# Patient Record
Sex: MALE | Race: WHITE | NOT HISPANIC OR LATINO | Employment: FULL TIME | URBAN - METROPOLITAN AREA
[De-identification: names, ages, dates, MRNs, and addresses within clinical notes are randomized per-mention and may not be internally consistent; named-entity substitution may affect disease eponyms.]

---

## 2017-01-03 ENCOUNTER — COMMUNICATION - HEALTHEAST (OUTPATIENT)
Dept: NURSING | Facility: CLINIC | Age: 52
End: 2017-01-03

## 2017-01-03 ENCOUNTER — OFFICE VISIT - HEALTHEAST (OUTPATIENT)
Dept: INTERNAL MEDICINE | Facility: CLINIC | Age: 52
End: 2017-01-03

## 2017-01-03 DIAGNOSIS — I48.0 PAROXYSMAL ATRIAL FIBRILLATION (H): ICD-10-CM

## 2017-01-03 DIAGNOSIS — I48.92 ATRIAL FLUTTER (H): ICD-10-CM

## 2017-01-03 DIAGNOSIS — Q21.10 ASD (ATRIAL SEPTAL DEFECT): ICD-10-CM

## 2017-01-03 DIAGNOSIS — I42.9 CARDIOMYOPATHY (H): ICD-10-CM

## 2017-01-03 DIAGNOSIS — I48.92 ATRIAL FLUTTER, UNSPECIFIED TYPE (H): ICD-10-CM

## 2017-01-03 DIAGNOSIS — J06.9 UPPER RESPIRATORY TRACT INFECTION, UNSPECIFIED TYPE: ICD-10-CM

## 2017-01-03 DIAGNOSIS — J02.9 SORE THROAT: ICD-10-CM

## 2017-01-03 ASSESSMENT — MIFFLIN-ST. JEOR: SCORE: 1638.19

## 2017-01-04 ENCOUNTER — COMMUNICATION - HEALTHEAST (OUTPATIENT)
Dept: INTERNAL MEDICINE | Facility: CLINIC | Age: 52
End: 2017-01-04

## 2017-01-05 ENCOUNTER — COMMUNICATION - HEALTHEAST (OUTPATIENT)
Dept: INTERNAL MEDICINE | Facility: CLINIC | Age: 52
End: 2017-01-05

## 2017-01-09 ENCOUNTER — AMBULATORY - HEALTHEAST (OUTPATIENT)
Dept: LAB | Facility: CLINIC | Age: 52
End: 2017-01-09

## 2017-01-09 ENCOUNTER — COMMUNICATION - HEALTHEAST (OUTPATIENT)
Dept: NURSING | Facility: CLINIC | Age: 52
End: 2017-01-09

## 2017-01-09 DIAGNOSIS — I48.92 ATRIAL FLUTTER (H): ICD-10-CM

## 2017-01-09 DIAGNOSIS — Q21.10 ASD (ATRIAL SEPTAL DEFECT): ICD-10-CM

## 2017-01-09 DIAGNOSIS — I42.9 CARDIOMYOPATHY (H): ICD-10-CM

## 2017-01-09 DIAGNOSIS — I48.0 PAROXYSMAL ATRIAL FIBRILLATION (H): ICD-10-CM

## 2017-01-09 DIAGNOSIS — I48.92 ATRIAL FLUTTER, UNSPECIFIED TYPE (H): ICD-10-CM

## 2017-01-11 ENCOUNTER — OFFICE VISIT - HEALTHEAST (OUTPATIENT)
Dept: INTERNAL MEDICINE | Facility: CLINIC | Age: 52
End: 2017-01-11

## 2017-01-11 ENCOUNTER — COMMUNICATION - HEALTHEAST (OUTPATIENT)
Dept: INTERNAL MEDICINE | Facility: CLINIC | Age: 52
End: 2017-01-11

## 2017-01-11 DIAGNOSIS — I50.22 CHRONIC SYSTOLIC CHF (CONGESTIVE HEART FAILURE), NYHA CLASS 2 (H): ICD-10-CM

## 2017-01-11 DIAGNOSIS — J06.9 URI (UPPER RESPIRATORY INFECTION): ICD-10-CM

## 2017-01-11 DIAGNOSIS — N41.9 PROSTATITIS: ICD-10-CM

## 2017-01-11 DIAGNOSIS — N18.30 CRI (CHRONIC RENAL INSUFFICIENCY), STAGE 3 (MODERATE) (H): ICD-10-CM

## 2017-01-11 DIAGNOSIS — J45.20 MILD INTERMITTENT ASTHMA: ICD-10-CM

## 2017-01-11 ASSESSMENT — MIFFLIN-ST. JEOR: SCORE: 1633.65

## 2017-01-24 ENCOUNTER — COMMUNICATION - HEALTHEAST (OUTPATIENT)
Dept: NURSING | Facility: CLINIC | Age: 52
End: 2017-01-24

## 2017-01-24 ENCOUNTER — AMBULATORY - HEALTHEAST (OUTPATIENT)
Dept: LAB | Facility: CLINIC | Age: 52
End: 2017-01-24

## 2017-01-24 DIAGNOSIS — Q21.10 ASD (ATRIAL SEPTAL DEFECT): ICD-10-CM

## 2017-01-24 DIAGNOSIS — I48.0 PAROXYSMAL ATRIAL FIBRILLATION (H): ICD-10-CM

## 2017-01-24 DIAGNOSIS — I42.9 CARDIOMYOPATHY (H): ICD-10-CM

## 2017-01-24 DIAGNOSIS — I48.92 ATRIAL FLUTTER, UNSPECIFIED TYPE (H): ICD-10-CM

## 2017-01-24 DIAGNOSIS — I48.92 ATRIAL FLUTTER (H): ICD-10-CM

## 2017-01-26 ENCOUNTER — RECORDS - HEALTHEAST (OUTPATIENT)
Dept: ADMINISTRATIVE | Facility: OTHER | Age: 52
End: 2017-01-26

## 2017-01-31 ENCOUNTER — COMMUNICATION - HEALTHEAST (OUTPATIENT)
Dept: INTERNAL MEDICINE | Facility: CLINIC | Age: 52
End: 2017-01-31

## 2017-02-14 ENCOUNTER — AMBULATORY - HEALTHEAST (OUTPATIENT)
Dept: CARDIOLOGY | Facility: CLINIC | Age: 52
End: 2017-02-14

## 2017-02-14 DIAGNOSIS — Z95.810 ICD (IMPLANTABLE CARDIOVERTER-DEFIBRILLATOR), BIVENTRICULAR, IN SITU: ICD-10-CM

## 2017-02-15 ENCOUNTER — OFFICE VISIT - HEALTHEAST (OUTPATIENT)
Dept: INTERNAL MEDICINE | Facility: CLINIC | Age: 52
End: 2017-02-15

## 2017-02-15 ENCOUNTER — COMMUNICATION - HEALTHEAST (OUTPATIENT)
Dept: NURSING | Facility: CLINIC | Age: 52
End: 2017-02-15

## 2017-02-15 DIAGNOSIS — J45.901 ASTHMA EXACERBATION: ICD-10-CM

## 2017-02-15 DIAGNOSIS — I42.9 CARDIOMYOPATHY (H): ICD-10-CM

## 2017-02-15 DIAGNOSIS — I48.0 PAROXYSMAL ATRIAL FIBRILLATION (H): ICD-10-CM

## 2017-02-15 DIAGNOSIS — I48.92 ATRIAL FLUTTER, UNSPECIFIED TYPE (H): ICD-10-CM

## 2017-02-15 DIAGNOSIS — I48.92 ATRIAL FLUTTER (H): ICD-10-CM

## 2017-02-15 DIAGNOSIS — Q21.10 ASD (ATRIAL SEPTAL DEFECT): ICD-10-CM

## 2017-02-15 DIAGNOSIS — J32.9 SINUSITIS: ICD-10-CM

## 2017-02-15 ASSESSMENT — MIFFLIN-ST. JEOR: SCORE: 1620.05

## 2017-02-20 ENCOUNTER — COMMUNICATION - HEALTHEAST (OUTPATIENT)
Dept: NURSING | Facility: CLINIC | Age: 52
End: 2017-02-20

## 2017-02-20 ENCOUNTER — AMBULATORY - HEALTHEAST (OUTPATIENT)
Dept: LAB | Facility: CLINIC | Age: 52
End: 2017-02-20

## 2017-02-20 DIAGNOSIS — Q21.10 ASD (ATRIAL SEPTAL DEFECT): ICD-10-CM

## 2017-02-20 DIAGNOSIS — I48.92 ATRIAL FLUTTER (H): ICD-10-CM

## 2017-02-20 DIAGNOSIS — I48.0 PAROXYSMAL ATRIAL FIBRILLATION (H): ICD-10-CM

## 2017-02-20 DIAGNOSIS — I48.92 ATRIAL FLUTTER, UNSPECIFIED TYPE (H): ICD-10-CM

## 2017-02-20 DIAGNOSIS — I42.9 CARDIOMYOPATHY (H): ICD-10-CM

## 2017-03-11 ENCOUNTER — COMMUNICATION - HEALTHEAST (OUTPATIENT)
Dept: INTERNAL MEDICINE | Facility: CLINIC | Age: 52
End: 2017-03-11

## 2017-03-11 DIAGNOSIS — J30.2 ALLERGIC RHINITIS, SEASONAL: ICD-10-CM

## 2017-03-20 ENCOUNTER — COMMUNICATION - HEALTHEAST (OUTPATIENT)
Dept: INTERNAL MEDICINE | Facility: CLINIC | Age: 52
End: 2017-03-20

## 2017-03-27 ENCOUNTER — COMMUNICATION - HEALTHEAST (OUTPATIENT)
Dept: INTERNAL MEDICINE | Facility: CLINIC | Age: 52
End: 2017-03-27

## 2017-03-27 ENCOUNTER — COMMUNICATION - HEALTHEAST (OUTPATIENT)
Dept: NURSING | Facility: CLINIC | Age: 52
End: 2017-03-27

## 2017-03-27 DIAGNOSIS — J32.9 SINUSITIS: ICD-10-CM

## 2017-03-27 DIAGNOSIS — J32.9 RECURRENT SINUSITIS: ICD-10-CM

## 2017-03-31 ENCOUNTER — AMBULATORY - HEALTHEAST (OUTPATIENT)
Dept: LAB | Facility: CLINIC | Age: 52
End: 2017-03-31

## 2017-03-31 ENCOUNTER — COMMUNICATION - HEALTHEAST (OUTPATIENT)
Dept: NURSING | Facility: CLINIC | Age: 52
End: 2017-03-31

## 2017-03-31 DIAGNOSIS — I48.92 ATRIAL FLUTTER (H): ICD-10-CM

## 2017-03-31 DIAGNOSIS — I42.9 CARDIOMYOPATHY (H): ICD-10-CM

## 2017-03-31 DIAGNOSIS — Q21.10 ASD (ATRIAL SEPTAL DEFECT): ICD-10-CM

## 2017-03-31 DIAGNOSIS — I48.0 PAROXYSMAL ATRIAL FIBRILLATION (H): ICD-10-CM

## 2017-04-03 ENCOUNTER — COMMUNICATION - HEALTHEAST (OUTPATIENT)
Dept: INTERNAL MEDICINE | Facility: CLINIC | Age: 52
End: 2017-04-03

## 2017-04-04 ENCOUNTER — COMMUNICATION - HEALTHEAST (OUTPATIENT)
Dept: INTERNAL MEDICINE | Facility: CLINIC | Age: 52
End: 2017-04-04

## 2017-04-05 ENCOUNTER — COMMUNICATION - HEALTHEAST (OUTPATIENT)
Dept: NURSING | Facility: CLINIC | Age: 52
End: 2017-04-05

## 2017-04-05 DIAGNOSIS — I48.91 ATRIAL FIBRILLATION (H): ICD-10-CM

## 2017-04-07 ENCOUNTER — COMMUNICATION - HEALTHEAST (OUTPATIENT)
Dept: INTERNAL MEDICINE | Facility: CLINIC | Age: 52
End: 2017-04-07

## 2017-04-13 ENCOUNTER — COMMUNICATION - HEALTHEAST (OUTPATIENT)
Dept: NURSING | Facility: CLINIC | Age: 52
End: 2017-04-13

## 2017-04-13 ENCOUNTER — AMBULATORY - HEALTHEAST (OUTPATIENT)
Dept: LAB | Facility: CLINIC | Age: 52
End: 2017-04-13

## 2017-04-13 DIAGNOSIS — Q21.10 ASD (ATRIAL SEPTAL DEFECT): ICD-10-CM

## 2017-04-13 DIAGNOSIS — I48.91 ATRIAL FIBRILLATION (H): ICD-10-CM

## 2017-04-13 DIAGNOSIS — I48.0 PAROXYSMAL ATRIAL FIBRILLATION (H): ICD-10-CM

## 2017-04-13 DIAGNOSIS — I42.9 CARDIOMYOPATHY (H): ICD-10-CM

## 2017-04-13 DIAGNOSIS — I48.92 ATRIAL FLUTTER (H): ICD-10-CM

## 2017-04-20 ENCOUNTER — OFFICE VISIT - HEALTHEAST (OUTPATIENT)
Dept: ALLERGY | Facility: CLINIC | Age: 52
End: 2017-04-20

## 2017-04-20 ENCOUNTER — RECORDS - HEALTHEAST (OUTPATIENT)
Dept: ADMINISTRATIVE | Facility: OTHER | Age: 52
End: 2017-04-20

## 2017-04-20 DIAGNOSIS — J30.89 ALLERGIC RHINITIS DUE TO OTHER ALLERGEN: ICD-10-CM

## 2017-04-20 DIAGNOSIS — J45.30 MILD PERSISTENT ASTHMA WITHOUT COMPLICATION: ICD-10-CM

## 2017-04-21 ENCOUNTER — AMBULATORY - HEALTHEAST (OUTPATIENT)
Dept: ALLERGY | Facility: CLINIC | Age: 52
End: 2017-04-21

## 2017-04-21 ENCOUNTER — COMMUNICATION - HEALTHEAST (OUTPATIENT)
Dept: ALLERGY | Facility: CLINIC | Age: 52
End: 2017-04-21

## 2017-04-21 DIAGNOSIS — J30.9 ALLERGIC RHINITIS: ICD-10-CM

## 2017-04-22 ENCOUNTER — COMMUNICATION - HEALTHEAST (OUTPATIENT)
Dept: INTERNAL MEDICINE | Facility: CLINIC | Age: 52
End: 2017-04-22

## 2017-04-25 ENCOUNTER — OFFICE VISIT - HEALTHEAST (OUTPATIENT)
Dept: CARDIOLOGY | Facility: CLINIC | Age: 52
End: 2017-04-25

## 2017-04-25 ENCOUNTER — COMMUNICATION - HEALTHEAST (OUTPATIENT)
Dept: NURSING | Facility: CLINIC | Age: 52
End: 2017-04-25

## 2017-04-25 DIAGNOSIS — Q21.10 ASD (ATRIAL SEPTAL DEFECT): ICD-10-CM

## 2017-04-25 DIAGNOSIS — I48.0 PAROXYSMAL ATRIAL FIBRILLATION (H): ICD-10-CM

## 2017-04-25 DIAGNOSIS — I42.9 CARDIOMYOPATHY (H): ICD-10-CM

## 2017-04-25 DIAGNOSIS — I47.29 NSVT (NONSUSTAINED VENTRICULAR TACHYCARDIA) (H): ICD-10-CM

## 2017-04-25 DIAGNOSIS — I48.91 ATRIAL FIBRILLATION (H): ICD-10-CM

## 2017-04-25 DIAGNOSIS — I48.92 ATRIAL FLUTTER (H): ICD-10-CM

## 2017-04-25 ASSESSMENT — MIFFLIN-ST. JEOR: SCORE: 1630.03

## 2017-05-08 ENCOUNTER — COMMUNICATION - HEALTHEAST (OUTPATIENT)
Dept: INTERNAL MEDICINE | Facility: CLINIC | Age: 52
End: 2017-05-08

## 2017-05-08 ENCOUNTER — HOSPITAL ENCOUNTER (OUTPATIENT)
Dept: CARDIOLOGY | Facility: HOSPITAL | Age: 52
Discharge: HOME | End: 2017-05-08
Attending: INTERNAL MEDICINE

## 2017-05-08 DIAGNOSIS — I42.9 CARDIOMYOPATHY (H): ICD-10-CM

## 2017-05-08 LAB
AORTIC ROOT: 3.4 CM
DOP CALC LVOT AREA: 3.8 CM2
DOP CALC LVOT DIAMETER: 2.2 CM
DOP CALC LVOT PEAK VEL: 79.7 CM/S
DOP CALC LVOT STROKE VOLUME: 54.7 CM3
DOP CALCLVOT PEAK VEL VTI: 14.4 CM
ECHO EJECTION FRACTION ESTIMATED: 25 %
EJECTION FRACTION: 32 % (ref 55–75)
FRACTIONAL SHORTENING: 9.3 % (ref 28–44)
INTERVENTRICULAR SEPTUM IN END DIASTOLE: 1.1 CM (ref 0.6–1)
IVS/PW RATIO: 1
LA AREA 1: 45.8 CM2
LA AREA 2: 52.7 CM2
LEFT ATRIUM LENGTH: 7.56 CM
LEFT ATRIUM SIZE: 6.5 CM
LEFT ATRIUM VOLUME: 271.4 CM3
LEFT VENTRICLE DIASTOLIC VOLUME: 169 CM3 (ref 62–150)
LEFT VENTRICLE SYSTOLIC VOLUME: 115 CM3 (ref 21–61)
LEFT VENTRICULAR INTERNAL DIMENSION IN DIASTOLE: 5.4 CM (ref 4.2–5.8)
LEFT VENTRICULAR INTERNAL DIMENSION IN SYSTOLE: 4.9 CM (ref 2.5–4)
LEFT VENTRICULAR MASS: 234.8 G
LEFT VENTRICULAR OUTFLOW TRACT MEAN GRADIENT: 2 MMHG
LEFT VENTRICULAR OUTFLOW TRACT MEAN VELOCITY: 65.6 CM/S
LEFT VENTRICULAR OUTFLOW TRACT PEAK GRADIENT: 3 MMHG
LEFT VENTRICULAR POSTERIOR WALL IN END DIASTOLE: 1.1 CM (ref 0.6–1)
MITRAL REGURGITANT VELOCITY TIME INTEGRAL: 127 CM
MR FLOW: 17 CM3
MR MEAN GRADIENT: 49 MMHG
MR MEAN VELOCITY: 342 CM/S
MR PEAK GRADIENT: 60.5 MMHG
MR PISA EROA: 0.1 CM2
MR PISA RADIUS: 0.5 CM
MR PISA VN NYQUIST: 30.8 CM/S
MV AVERAGE E/E' RATIO: 15 CM/S
MV DECELERATION TIME: 109 MS
MV E'TISSUE VEL-LAT: 7.02 CM/S
MV E'TISSUE VEL-MED: 6.14 CM/S
MV LATERAL E/E' RATIO: 14.1
MV MEDIAL E/E' RATIO: 16.1
MV PEAK E VELOCITY: 98.7 CM/S
MV REGURGITANT VOLUME: 15.8 CC
PISA MR PEAK VEL: 389 CM/S
PR MAX PG: 9 MMHG
PR PEAK VELOCITY: 151 CM/S
TRICUSPID REGURGITATION PEAK PRESSURE GRADIENT: 20.6 MMHG
TRICUSPID VALVE ANULAR PLANE SYSTOLIC EXCURSION: 0.8 CM
TRICUSPID VALVE PEAK REGURGITANT VELOCITY: 227 CM/S

## 2017-05-15 ENCOUNTER — AMBULATORY - HEALTHEAST (OUTPATIENT)
Dept: CARDIOLOGY | Facility: CLINIC | Age: 52
End: 2017-05-15

## 2017-05-15 ENCOUNTER — COMMUNICATION - HEALTHEAST (OUTPATIENT)
Dept: NURSING | Facility: CLINIC | Age: 52
End: 2017-05-15

## 2017-05-15 DIAGNOSIS — Z95.810 ICD (IMPLANTABLE CARDIOVERTER-DEFIBRILLATOR), BIVENTRICULAR, IN SITU: ICD-10-CM

## 2017-05-16 LAB — HCC DEVICE COMMENTS: NORMAL

## 2017-05-18 ENCOUNTER — COMMUNICATION - HEALTHEAST (OUTPATIENT)
Dept: NURSING | Facility: CLINIC | Age: 52
End: 2017-05-18

## 2017-05-18 ENCOUNTER — AMBULATORY - HEALTHEAST (OUTPATIENT)
Dept: LAB | Facility: CLINIC | Age: 52
End: 2017-05-18

## 2017-05-18 DIAGNOSIS — I48.92 ATRIAL FLUTTER (H): ICD-10-CM

## 2017-05-18 DIAGNOSIS — I48.0 PAROXYSMAL ATRIAL FIBRILLATION (H): ICD-10-CM

## 2017-05-18 DIAGNOSIS — Q21.10 ASD (ATRIAL SEPTAL DEFECT): ICD-10-CM

## 2017-05-18 DIAGNOSIS — I48.91 ATRIAL FIBRILLATION (H): ICD-10-CM

## 2017-05-18 DIAGNOSIS — I42.9 CARDIOMYOPATHY (H): ICD-10-CM

## 2017-05-22 ENCOUNTER — COMMUNICATION - HEALTHEAST (OUTPATIENT)
Dept: INTERNAL MEDICINE | Facility: CLINIC | Age: 52
End: 2017-05-22

## 2017-05-22 DIAGNOSIS — J45.20 MILD INTERMITTENT ASTHMA: ICD-10-CM

## 2017-06-01 ENCOUNTER — COMMUNICATION - HEALTHEAST (OUTPATIENT)
Dept: NURSING | Facility: CLINIC | Age: 52
End: 2017-06-01

## 2017-06-01 ENCOUNTER — OFFICE VISIT - HEALTHEAST (OUTPATIENT)
Dept: INTERNAL MEDICINE | Facility: CLINIC | Age: 52
End: 2017-06-01

## 2017-06-01 ENCOUNTER — COMMUNICATION - HEALTHEAST (OUTPATIENT)
Dept: INTERNAL MEDICINE | Facility: CLINIC | Age: 52
End: 2017-06-01

## 2017-06-01 DIAGNOSIS — I42.9 CARDIOMYOPATHY (H): ICD-10-CM

## 2017-06-01 DIAGNOSIS — I48.0 PAROXYSMAL ATRIAL FIBRILLATION (H): ICD-10-CM

## 2017-06-01 DIAGNOSIS — Z51.81 MEDICATION MONITORING ENCOUNTER: ICD-10-CM

## 2017-06-01 DIAGNOSIS — Q21.10 ASD (ATRIAL SEPTAL DEFECT): ICD-10-CM

## 2017-06-01 DIAGNOSIS — Z86.79 HISTORY OF VENTRICULAR TACHYCARDIA: ICD-10-CM

## 2017-06-01 DIAGNOSIS — Z95.810 ICD (IMPLANTABLE CARDIOVERTER-DEFIBRILLATOR), BIVENTRICULAR, IN SITU: ICD-10-CM

## 2017-06-01 DIAGNOSIS — E03.9 HYPOTHYROIDISM, UNSPECIFIED TYPE: ICD-10-CM

## 2017-06-01 DIAGNOSIS — I48.92 ATRIAL FLUTTER (H): ICD-10-CM

## 2017-06-01 DIAGNOSIS — J45.20 MILD INTERMITTENT ASTHMA WITHOUT COMPLICATION: ICD-10-CM

## 2017-06-01 DIAGNOSIS — I48.91 ATRIAL FIBRILLATION (H): ICD-10-CM

## 2017-06-01 DIAGNOSIS — I50.22 CHRONIC SYSTOLIC CHF (CONGESTIVE HEART FAILURE), NYHA CLASS 2 (H): ICD-10-CM

## 2017-06-01 ASSESSMENT — MIFFLIN-ST. JEOR: SCORE: 1610.97

## 2017-06-02 ENCOUNTER — COMMUNICATION - HEALTHEAST (OUTPATIENT)
Dept: INTERNAL MEDICINE | Facility: CLINIC | Age: 52
End: 2017-06-02

## 2017-06-06 ENCOUNTER — COMMUNICATION - HEALTHEAST (OUTPATIENT)
Dept: INTERNAL MEDICINE | Facility: CLINIC | Age: 52
End: 2017-06-06

## 2017-06-06 ENCOUNTER — AMBULATORY - HEALTHEAST (OUTPATIENT)
Dept: LAB | Facility: CLINIC | Age: 52
End: 2017-06-06

## 2017-06-06 DIAGNOSIS — Z51.81 MEDICATION MONITORING ENCOUNTER: ICD-10-CM

## 2017-06-14 ENCOUNTER — HOSPITAL ENCOUNTER (OUTPATIENT)
Dept: RESPIRATORY THERAPY | Facility: HOSPITAL | Age: 52
Discharge: HOME | End: 2017-06-14
Attending: INTERNAL MEDICINE

## 2017-06-14 DIAGNOSIS — J45.20 MILD INTERMITTENT ASTHMA WITHOUT COMPLICATION: ICD-10-CM

## 2017-06-18 ENCOUNTER — COMMUNICATION - HEALTHEAST (OUTPATIENT)
Dept: INTERNAL MEDICINE | Facility: CLINIC | Age: 52
End: 2017-06-18

## 2017-06-19 ENCOUNTER — COMMUNICATION - HEALTHEAST (OUTPATIENT)
Dept: INTERNAL MEDICINE | Facility: CLINIC | Age: 52
End: 2017-06-19

## 2017-06-19 DIAGNOSIS — J30.9 ALLERGIC RHINITIS: ICD-10-CM

## 2017-06-25 ENCOUNTER — COMMUNICATION - HEALTHEAST (OUTPATIENT)
Dept: INTERNAL MEDICINE | Facility: CLINIC | Age: 52
End: 2017-06-25

## 2017-07-06 ENCOUNTER — COMMUNICATION - HEALTHEAST (OUTPATIENT)
Dept: NURSING | Facility: CLINIC | Age: 52
End: 2017-07-06

## 2017-07-11 ENCOUNTER — RECORDS - HEALTHEAST (OUTPATIENT)
Dept: ADMINISTRATIVE | Facility: OTHER | Age: 52
End: 2017-07-11

## 2017-07-11 ENCOUNTER — AMBULATORY - HEALTHEAST (OUTPATIENT)
Dept: CARDIOLOGY | Facility: CLINIC | Age: 52
End: 2017-07-11

## 2017-07-13 ENCOUNTER — COMMUNICATION - HEALTHEAST (OUTPATIENT)
Dept: NURSING | Facility: CLINIC | Age: 52
End: 2017-07-13

## 2017-07-13 DIAGNOSIS — I48.92 ATRIAL FLUTTER (H): ICD-10-CM

## 2017-07-13 DIAGNOSIS — I42.9 CARDIOMYOPATHY (H): ICD-10-CM

## 2017-07-13 DIAGNOSIS — Q21.10 ASD (ATRIAL SEPTAL DEFECT): ICD-10-CM

## 2017-07-13 DIAGNOSIS — I48.0 PAROXYSMAL ATRIAL FIBRILLATION (H): ICD-10-CM

## 2017-07-18 ENCOUNTER — COMMUNICATION - HEALTHEAST (OUTPATIENT)
Dept: ALLERGY | Facility: CLINIC | Age: 52
End: 2017-07-18

## 2017-07-18 DIAGNOSIS — J45.30 MILD PERSISTENT ASTHMA WITHOUT COMPLICATION: ICD-10-CM

## 2017-07-21 ENCOUNTER — RECORDS - HEALTHEAST (OUTPATIENT)
Dept: ADMINISTRATIVE | Facility: OTHER | Age: 52
End: 2017-07-21

## 2017-07-27 ENCOUNTER — COMMUNICATION - HEALTHEAST (OUTPATIENT)
Dept: NURSING | Facility: CLINIC | Age: 52
End: 2017-07-27

## 2017-07-27 ENCOUNTER — AMBULATORY - HEALTHEAST (OUTPATIENT)
Dept: LAB | Facility: CLINIC | Age: 52
End: 2017-07-27

## 2017-07-27 DIAGNOSIS — I48.0 PAROXYSMAL ATRIAL FIBRILLATION (H): ICD-10-CM

## 2017-07-27 DIAGNOSIS — I48.91 ATRIAL FIBRILLATION (H): ICD-10-CM

## 2017-07-27 DIAGNOSIS — I48.92 ATRIAL FLUTTER (H): ICD-10-CM

## 2017-07-27 DIAGNOSIS — Q21.10 ASD (ATRIAL SEPTAL DEFECT): ICD-10-CM

## 2017-07-27 DIAGNOSIS — I42.9 CARDIOMYOPATHY (H): ICD-10-CM

## 2017-08-01 ENCOUNTER — COMMUNICATION - HEALTHEAST (OUTPATIENT)
Dept: INTERNAL MEDICINE | Facility: CLINIC | Age: 52
End: 2017-08-01

## 2017-08-10 ENCOUNTER — COMMUNICATION - HEALTHEAST (OUTPATIENT)
Dept: INTERNAL MEDICINE | Facility: CLINIC | Age: 52
End: 2017-08-10

## 2017-08-17 ENCOUNTER — COMMUNICATION - HEALTHEAST (OUTPATIENT)
Dept: ALLERGY | Facility: CLINIC | Age: 52
End: 2017-08-17

## 2017-08-17 DIAGNOSIS — J45.30 MILD PERSISTENT ASTHMA WITHOUT COMPLICATION: ICD-10-CM

## 2017-08-21 ENCOUNTER — COMMUNICATION - HEALTHEAST (OUTPATIENT)
Dept: ALLERGY | Facility: CLINIC | Age: 52
End: 2017-08-21

## 2017-08-21 DIAGNOSIS — J45.30 MILD PERSISTENT ASTHMA WITHOUT COMPLICATION: ICD-10-CM

## 2017-08-23 ENCOUNTER — AMBULATORY - HEALTHEAST (OUTPATIENT)
Dept: LAB | Facility: CLINIC | Age: 52
End: 2017-08-23

## 2017-08-23 ENCOUNTER — COMMUNICATION - HEALTHEAST (OUTPATIENT)
Dept: NURSING | Facility: CLINIC | Age: 52
End: 2017-08-23

## 2017-08-23 DIAGNOSIS — I48.92 ATRIAL FLUTTER (H): ICD-10-CM

## 2017-08-23 DIAGNOSIS — I48.0 PAROXYSMAL ATRIAL FIBRILLATION (H): ICD-10-CM

## 2017-08-23 DIAGNOSIS — I42.9 CARDIOMYOPATHY (H): ICD-10-CM

## 2017-08-23 DIAGNOSIS — I48.91 ATRIAL FIBRILLATION (H): ICD-10-CM

## 2017-08-23 DIAGNOSIS — Q21.10 ASD (ATRIAL SEPTAL DEFECT): ICD-10-CM

## 2017-08-28 ENCOUNTER — RECORDS - HEALTHEAST (OUTPATIENT)
Dept: ADMINISTRATIVE | Facility: OTHER | Age: 52
End: 2017-08-28

## 2017-08-28 ENCOUNTER — COMMUNICATION - HEALTHEAST (OUTPATIENT)
Dept: INTERNAL MEDICINE | Facility: CLINIC | Age: 52
End: 2017-08-28

## 2017-08-29 ENCOUNTER — AMBULATORY - HEALTHEAST (OUTPATIENT)
Dept: CARDIOLOGY | Facility: CLINIC | Age: 52
End: 2017-08-29

## 2017-08-31 ENCOUNTER — OFFICE VISIT - HEALTHEAST (OUTPATIENT)
Dept: ALLERGY | Facility: CLINIC | Age: 52
End: 2017-08-31

## 2017-08-31 DIAGNOSIS — J30.89 ALLERGIC RHINITIS DUE TO OTHER ALLERGEN: ICD-10-CM

## 2017-08-31 DIAGNOSIS — J45.40 MODERATE PERSISTENT ASTHMA WITHOUT COMPLICATION: ICD-10-CM

## 2017-09-05 ENCOUNTER — AMBULATORY - HEALTHEAST (OUTPATIENT)
Dept: CARDIOLOGY | Facility: CLINIC | Age: 52
End: 2017-09-05

## 2017-09-05 DIAGNOSIS — Z95.810 ICD (IMPLANTABLE CARDIOVERTER-DEFIBRILLATOR), BIVENTRICULAR, IN SITU: ICD-10-CM

## 2017-09-06 ENCOUNTER — COMMUNICATION - HEALTHEAST (OUTPATIENT)
Dept: CARDIOLOGY | Facility: CLINIC | Age: 52
End: 2017-09-06

## 2017-09-07 LAB — HCC DEVICE COMMENTS: NORMAL

## 2017-09-13 ENCOUNTER — COMMUNICATION - HEALTHEAST (OUTPATIENT)
Dept: INTERNAL MEDICINE | Facility: CLINIC | Age: 52
End: 2017-09-13

## 2017-09-13 ENCOUNTER — AMBULATORY - HEALTHEAST (OUTPATIENT)
Dept: CARDIOLOGY | Facility: CLINIC | Age: 52
End: 2017-09-13

## 2017-09-13 ENCOUNTER — AMBULATORY - HEALTHEAST (OUTPATIENT)
Dept: LAB | Facility: CLINIC | Age: 52
End: 2017-09-13

## 2017-09-13 ENCOUNTER — COMMUNICATION - HEALTHEAST (OUTPATIENT)
Dept: NURSING | Facility: CLINIC | Age: 52
End: 2017-09-13

## 2017-09-13 DIAGNOSIS — I42.9 CARDIOMYOPATHY (H): ICD-10-CM

## 2017-09-13 DIAGNOSIS — Z95.810 ICD (IMPLANTABLE CARDIOVERTER-DEFIBRILLATOR), BIVENTRICULAR, IN SITU: ICD-10-CM

## 2017-09-13 DIAGNOSIS — I48.0 PAROXYSMAL ATRIAL FIBRILLATION (H): ICD-10-CM

## 2017-09-13 DIAGNOSIS — I48.92 ATRIAL FLUTTER (H): ICD-10-CM

## 2017-09-13 DIAGNOSIS — Q21.10 ASD (ATRIAL SEPTAL DEFECT): ICD-10-CM

## 2017-09-13 DIAGNOSIS — I48.91 ATRIAL FIBRILLATION (H): ICD-10-CM

## 2017-09-13 DIAGNOSIS — J45.20 MILD INTERMITTENT ASTHMA: ICD-10-CM

## 2017-09-13 LAB — HCC DEVICE COMMENTS: NORMAL

## 2017-09-13 ASSESSMENT — MIFFLIN-ST. JEOR: SCORE: 1641.82

## 2017-09-18 ENCOUNTER — RECORDS - HEALTHEAST (OUTPATIENT)
Dept: ADMINISTRATIVE | Facility: OTHER | Age: 52
End: 2017-09-18

## 2017-09-19 ENCOUNTER — RECORDS - HEALTHEAST (OUTPATIENT)
Dept: ADMINISTRATIVE | Facility: OTHER | Age: 52
End: 2017-09-19

## 2017-09-19 ENCOUNTER — AMBULATORY - HEALTHEAST (OUTPATIENT)
Dept: CARDIOLOGY | Facility: CLINIC | Age: 52
End: 2017-09-19

## 2017-09-21 ENCOUNTER — RECORDS - HEALTHEAST (OUTPATIENT)
Dept: ADMINISTRATIVE | Facility: OTHER | Age: 52
End: 2017-09-21

## 2017-09-22 ENCOUNTER — AMBULATORY - HEALTHEAST (OUTPATIENT)
Dept: CARDIOLOGY | Facility: CLINIC | Age: 52
End: 2017-09-22

## 2017-09-26 ENCOUNTER — COMMUNICATION - HEALTHEAST (OUTPATIENT)
Dept: INTERNAL MEDICINE | Facility: CLINIC | Age: 52
End: 2017-09-26

## 2017-10-09 ENCOUNTER — COMMUNICATION - HEALTHEAST (OUTPATIENT)
Dept: ADMINISTRATIVE | Facility: CLINIC | Age: 52
End: 2017-10-09

## 2017-10-09 ENCOUNTER — AMBULATORY - HEALTHEAST (OUTPATIENT)
Dept: ALLERGY | Facility: CLINIC | Age: 52
End: 2017-10-09

## 2017-10-09 DIAGNOSIS — J45.40 MODERATE PERSISTENT ASTHMA WITHOUT COMPLICATION: ICD-10-CM

## 2017-10-11 ENCOUNTER — COMMUNICATION - HEALTHEAST (OUTPATIENT)
Dept: NURSING | Facility: CLINIC | Age: 52
End: 2017-10-11

## 2017-10-16 ENCOUNTER — OFFICE VISIT - HEALTHEAST (OUTPATIENT)
Dept: INTERNAL MEDICINE | Facility: CLINIC | Age: 52
End: 2017-10-16

## 2017-10-16 ENCOUNTER — COMMUNICATION - HEALTHEAST (OUTPATIENT)
Dept: NURSING | Facility: CLINIC | Age: 52
End: 2017-10-16

## 2017-10-16 DIAGNOSIS — Z51.81 MEDICATION MONITORING ENCOUNTER: ICD-10-CM

## 2017-10-16 DIAGNOSIS — K51.90 ULCERATIVE COLITIS WITHOUT COMPLICATIONS, UNSPECIFIED LOCATION (H): ICD-10-CM

## 2017-10-16 DIAGNOSIS — I48.0 PAROXYSMAL ATRIAL FIBRILLATION (H): ICD-10-CM

## 2017-10-16 DIAGNOSIS — Q21.10 ASD (ATRIAL SEPTAL DEFECT): ICD-10-CM

## 2017-10-16 DIAGNOSIS — I50.22 CHRONIC SYSTOLIC CHF (CONGESTIVE HEART FAILURE), NYHA CLASS 2 (H): ICD-10-CM

## 2017-10-16 DIAGNOSIS — I42.9 CARDIOMYOPATHY (H): ICD-10-CM

## 2017-10-16 DIAGNOSIS — I48.92 ATRIAL FLUTTER (H): ICD-10-CM

## 2017-10-16 DIAGNOSIS — E03.2 HYPOTHYROIDISM DUE TO MEDICATION: ICD-10-CM

## 2017-10-16 DIAGNOSIS — Z95.810 ICD (IMPLANTABLE CARDIOVERTER-DEFIBRILLATOR), BIVENTRICULAR, IN SITU: ICD-10-CM

## 2017-10-16 ASSESSMENT — MIFFLIN-ST. JEOR: SCORE: 1629.12

## 2017-10-17 ENCOUNTER — COMMUNICATION - HEALTHEAST (OUTPATIENT)
Dept: INTERNAL MEDICINE | Facility: CLINIC | Age: 52
End: 2017-10-17

## 2017-10-25 ENCOUNTER — COMMUNICATION - HEALTHEAST (OUTPATIENT)
Dept: INTERNAL MEDICINE | Facility: CLINIC | Age: 52
End: 2017-10-25

## 2017-10-25 DIAGNOSIS — I48.0 PAROXYSMAL ATRIAL FIBRILLATION (H): ICD-10-CM

## 2017-10-29 ENCOUNTER — COMMUNICATION - HEALTHEAST (OUTPATIENT)
Dept: INTERNAL MEDICINE | Facility: CLINIC | Age: 52
End: 2017-10-29

## 2017-10-29 DIAGNOSIS — E03.9 HYPOTHYROID: ICD-10-CM

## 2017-10-31 ENCOUNTER — RECORDS - HEALTHEAST (OUTPATIENT)
Dept: ADMINISTRATIVE | Facility: OTHER | Age: 52
End: 2017-10-31

## 2017-11-01 ENCOUNTER — AMBULATORY - HEALTHEAST (OUTPATIENT)
Dept: LAB | Facility: CLINIC | Age: 52
End: 2017-11-01

## 2017-11-01 ENCOUNTER — COMMUNICATION - HEALTHEAST (OUTPATIENT)
Dept: INTERNAL MEDICINE | Facility: CLINIC | Age: 52
End: 2017-11-01

## 2017-11-01 DIAGNOSIS — Q21.10 ASD (ATRIAL SEPTAL DEFECT): ICD-10-CM

## 2017-11-01 DIAGNOSIS — I48.91 ATRIAL FIBRILLATION (H): ICD-10-CM

## 2017-11-02 ENCOUNTER — COMMUNICATION - HEALTHEAST (OUTPATIENT)
Dept: ALLERGY | Facility: CLINIC | Age: 52
End: 2017-11-02

## 2017-11-03 ENCOUNTER — RECORDS - HEALTHEAST (OUTPATIENT)
Dept: ADMINISTRATIVE | Facility: OTHER | Age: 52
End: 2017-11-03

## 2017-11-09 ENCOUNTER — OFFICE VISIT - HEALTHEAST (OUTPATIENT)
Dept: ALLERGY | Facility: CLINIC | Age: 52
End: 2017-11-09

## 2017-11-09 ENCOUNTER — RECORDS - HEALTHEAST (OUTPATIENT)
Dept: ADMINISTRATIVE | Facility: OTHER | Age: 52
End: 2017-11-09

## 2017-11-09 DIAGNOSIS — J30.9 ALLERGIC RHINITIS: ICD-10-CM

## 2017-11-09 DIAGNOSIS — J45.40 MODERATE PERSISTENT ASTHMA WITHOUT COMPLICATION: ICD-10-CM

## 2017-11-13 ENCOUNTER — AMBULATORY - HEALTHEAST (OUTPATIENT)
Dept: ALLERGY | Facility: CLINIC | Age: 52
End: 2017-11-13

## 2017-11-13 DIAGNOSIS — J45.40 MODERATE PERSISTENT ASTHMA WITHOUT COMPLICATION: ICD-10-CM

## 2017-11-24 ENCOUNTER — COMMUNICATION - HEALTHEAST (OUTPATIENT)
Dept: INTERNAL MEDICINE | Facility: CLINIC | Age: 52
End: 2017-11-24

## 2017-11-30 ENCOUNTER — RECORDS - HEALTHEAST (OUTPATIENT)
Dept: ADMINISTRATIVE | Facility: OTHER | Age: 52
End: 2017-11-30

## 2017-12-01 ENCOUNTER — AMBULATORY - HEALTHEAST (OUTPATIENT)
Dept: LAB | Facility: CLINIC | Age: 52
End: 2017-12-01

## 2017-12-01 ENCOUNTER — COMMUNICATION - HEALTHEAST (OUTPATIENT)
Dept: NURSING | Facility: CLINIC | Age: 52
End: 2017-12-01

## 2017-12-01 DIAGNOSIS — I48.91 ATRIAL FIBRILLATION (H): ICD-10-CM

## 2017-12-01 DIAGNOSIS — Q21.10 ASD (ATRIAL SEPTAL DEFECT): ICD-10-CM

## 2017-12-04 ENCOUNTER — RECORDS - HEALTHEAST (OUTPATIENT)
Dept: ADMINISTRATIVE | Facility: OTHER | Age: 52
End: 2017-12-04

## 2017-12-06 ENCOUNTER — COMMUNICATION - HEALTHEAST (OUTPATIENT)
Dept: INTERNAL MEDICINE | Facility: CLINIC | Age: 52
End: 2017-12-06

## 2017-12-06 DIAGNOSIS — J30.2 ALLERGIC RHINITIS, SEASONAL: ICD-10-CM

## 2017-12-06 DIAGNOSIS — J30.2 CHRONIC SEASONAL ALLERGIC RHINITIS: ICD-10-CM

## 2017-12-08 ENCOUNTER — AMBULATORY - HEALTHEAST (OUTPATIENT)
Dept: LAB | Facility: CLINIC | Age: 52
End: 2017-12-08

## 2017-12-08 ENCOUNTER — COMMUNICATION - HEALTHEAST (OUTPATIENT)
Dept: INTERNAL MEDICINE | Facility: CLINIC | Age: 52
End: 2017-12-08

## 2017-12-08 ENCOUNTER — COMMUNICATION - HEALTHEAST (OUTPATIENT)
Dept: NURSING | Facility: CLINIC | Age: 52
End: 2017-12-08

## 2017-12-08 DIAGNOSIS — I48.91 ATRIAL FIBRILLATION (H): ICD-10-CM

## 2017-12-08 DIAGNOSIS — F34.1 DYSTHYMIA: ICD-10-CM

## 2017-12-08 DIAGNOSIS — I42.9 CARDIOMYOPATHY (H): ICD-10-CM

## 2017-12-08 DIAGNOSIS — Q21.10 ASD (ATRIAL SEPTAL DEFECT): ICD-10-CM

## 2017-12-08 DIAGNOSIS — I48.92 ATRIAL FLUTTER (H): ICD-10-CM

## 2017-12-10 ENCOUNTER — COMMUNICATION - HEALTHEAST (OUTPATIENT)
Dept: INTERNAL MEDICINE | Facility: CLINIC | Age: 52
End: 2017-12-10

## 2017-12-10 DIAGNOSIS — F34.1 DYSTHYMIA: ICD-10-CM

## 2017-12-10 DIAGNOSIS — J30.2 ALLERGIC RHINITIS, SEASONAL: ICD-10-CM

## 2017-12-12 ENCOUNTER — AMBULATORY - HEALTHEAST (OUTPATIENT)
Dept: CARDIOLOGY | Facility: CLINIC | Age: 52
End: 2017-12-12

## 2017-12-12 DIAGNOSIS — Z95.810 ICD (IMPLANTABLE CARDIOVERTER-DEFIBRILLATOR), BIVENTRICULAR, IN SITU: ICD-10-CM

## 2017-12-13 ENCOUNTER — COMMUNICATION - HEALTHEAST (OUTPATIENT)
Dept: CARDIOLOGY | Facility: CLINIC | Age: 52
End: 2017-12-13

## 2017-12-14 LAB — HCC DEVICE COMMENTS: NORMAL

## 2017-12-19 ENCOUNTER — TRANSFERRED RECORDS (OUTPATIENT)
Dept: HEALTH INFORMATION MANAGEMENT | Facility: CLINIC | Age: 52
End: 2017-12-19

## 2017-12-19 ENCOUNTER — SURGERY - HEALTHEAST (OUTPATIENT)
Dept: GASTROENTEROLOGY | Facility: HOSPITAL | Age: 52
End: 2017-12-19

## 2017-12-19 ASSESSMENT — MIFFLIN-ST. JEOR: SCORE: 1552.01

## 2017-12-20 ENCOUNTER — COMMUNICATION - HEALTHEAST (OUTPATIENT)
Dept: INTERNAL MEDICINE | Facility: CLINIC | Age: 52
End: 2017-12-20

## 2017-12-21 ENCOUNTER — COMMUNICATION - HEALTHEAST (OUTPATIENT)
Dept: INTERNAL MEDICINE | Facility: CLINIC | Age: 52
End: 2017-12-21

## 2017-12-21 DIAGNOSIS — I48.92 ATRIAL FLUTTER (H): ICD-10-CM

## 2017-12-21 DIAGNOSIS — J45.20 MILD INTERMITTENT ASTHMA: ICD-10-CM

## 2017-12-21 DIAGNOSIS — I48.91 ATRIAL FIBRILLATION (H): ICD-10-CM

## 2017-12-26 ENCOUNTER — AMBULATORY - HEALTHEAST (OUTPATIENT)
Dept: CARDIOLOGY | Facility: CLINIC | Age: 52
End: 2017-12-26

## 2017-12-26 ENCOUNTER — COMMUNICATION - HEALTHEAST (OUTPATIENT)
Dept: INTERNAL MEDICINE | Facility: CLINIC | Age: 52
End: 2017-12-26

## 2017-12-26 DIAGNOSIS — I48.92 ATRIAL FLUTTER (H): ICD-10-CM

## 2017-12-26 DIAGNOSIS — I48.91 ATRIAL FIBRILLATION (H): ICD-10-CM

## 2017-12-26 DIAGNOSIS — Z95.810 ICD (IMPLANTABLE CARDIOVERTER-DEFIBRILLATOR), BIVENTRICULAR, IN SITU: ICD-10-CM

## 2017-12-26 LAB — HCC DEVICE COMMENTS: NORMAL

## 2018-01-03 ENCOUNTER — COMMUNICATION - HEALTHEAST (OUTPATIENT)
Dept: INTERNAL MEDICINE | Facility: CLINIC | Age: 53
End: 2018-01-03

## 2018-01-05 ENCOUNTER — AMBULATORY - HEALTHEAST (OUTPATIENT)
Dept: LAB | Facility: CLINIC | Age: 53
End: 2018-01-05

## 2018-01-05 ENCOUNTER — COMMUNICATION - HEALTHEAST (OUTPATIENT)
Dept: INTERNAL MEDICINE | Facility: CLINIC | Age: 53
End: 2018-01-05

## 2018-01-05 DIAGNOSIS — Q21.10 ASD (ATRIAL SEPTAL DEFECT): ICD-10-CM

## 2018-01-05 DIAGNOSIS — I48.91 ATRIAL FIBRILLATION (H): ICD-10-CM

## 2018-01-05 LAB — INR PPP: 2 (ref 0.9–1.1)

## 2018-01-08 ENCOUNTER — COMMUNICATION - HEALTHEAST (OUTPATIENT)
Dept: CARDIOLOGY | Facility: CLINIC | Age: 53
End: 2018-01-08

## 2018-01-08 DIAGNOSIS — I48.91 AF (ATRIAL FIBRILLATION) (H): ICD-10-CM

## 2018-01-08 DIAGNOSIS — I42.9 CARDIOMYOPATHY (H): ICD-10-CM

## 2018-01-08 DIAGNOSIS — I45.9 HEART BLOCK: ICD-10-CM

## 2018-01-11 ENCOUNTER — RECORDS - HEALTHEAST (OUTPATIENT)
Dept: ADMINISTRATIVE | Facility: OTHER | Age: 53
End: 2018-01-11

## 2018-01-13 ENCOUNTER — COMMUNICATION - HEALTHEAST (OUTPATIENT)
Dept: INTERNAL MEDICINE | Facility: CLINIC | Age: 53
End: 2018-01-13

## 2018-01-15 ENCOUNTER — OFFICE VISIT - HEALTHEAST (OUTPATIENT)
Dept: INTERNAL MEDICINE | Facility: CLINIC | Age: 53
End: 2018-01-15

## 2018-01-15 ENCOUNTER — COMMUNICATION - HEALTHEAST (OUTPATIENT)
Dept: INTERNAL MEDICINE | Facility: CLINIC | Age: 53
End: 2018-01-15

## 2018-01-15 DIAGNOSIS — I48.91 ATRIAL FIBRILLATION, UNSPECIFIED TYPE (H): ICD-10-CM

## 2018-01-15 DIAGNOSIS — E03.2 HYPOTHYROIDISM DUE TO MEDICATION: ICD-10-CM

## 2018-01-15 DIAGNOSIS — I48.91 ATRIAL FIBRILLATION (H): ICD-10-CM

## 2018-01-15 DIAGNOSIS — K51.90 ULCERATIVE COLITIS WITHOUT COMPLICATIONS, UNSPECIFIED LOCATION (H): ICD-10-CM

## 2018-01-15 DIAGNOSIS — I50.22 CHRONIC SYSTOLIC CHF (CONGESTIVE HEART FAILURE), NYHA CLASS 2 (H): ICD-10-CM

## 2018-01-15 DIAGNOSIS — Q21.10 ASD (ATRIAL SEPTAL DEFECT): ICD-10-CM

## 2018-01-15 DIAGNOSIS — Z51.81 MEDICATION MONITORING ENCOUNTER: ICD-10-CM

## 2018-01-15 DIAGNOSIS — Z86.79 HISTORY OF VENTRICULAR TACHYCARDIA: ICD-10-CM

## 2018-01-15 DIAGNOSIS — Z95.810 ICD (IMPLANTABLE CARDIOVERTER-DEFIBRILLATOR), BIVENTRICULAR, IN SITU: ICD-10-CM

## 2018-01-15 LAB
ANION GAP SERPL CALCULATED.3IONS-SCNC: 13 MMOL/L (ref 5–18)
BUN SERPL-MCNC: 11 MG/DL (ref 8–22)
CALCIUM SERPL-MCNC: 9.1 MG/DL (ref 8.5–10.5)
CHLORIDE BLD-SCNC: 99 MMOL/L (ref 98–107)
CO2 SERPL-SCNC: 27 MMOL/L (ref 22–31)
CREAT SERPL-MCNC: 1.11 MG/DL (ref 0.7–1.3)
ERYTHROCYTE [DISTWIDTH] IN BLOOD BY AUTOMATED COUNT: 13.8 % (ref 11–14.5)
GFR SERPL CREATININE-BSD FRML MDRD: >60 ML/MIN/1.73M2
GLUCOSE BLD-MCNC: 90 MG/DL (ref 70–125)
HCT VFR BLD AUTO: 46 % (ref 40–54)
HGB BLD-MCNC: 14.9 G/DL (ref 14–18)
INR PPP: 2.3 (ref 0.9–1.1)
MCH RBC QN AUTO: 28.6 PG (ref 27–34)
MCHC RBC AUTO-ENTMCNC: 32.5 G/DL (ref 32–36)
MCV RBC AUTO: 88 FL (ref 80–100)
PLATELET # BLD AUTO: 179 THOU/UL (ref 140–440)
PMV BLD AUTO: 8.1 FL (ref 7–10)
POTASSIUM BLD-SCNC: 4 MMOL/L (ref 3.5–5)
RBC # BLD AUTO: 5.23 MILL/UL (ref 4.4–6.2)
SODIUM SERPL-SCNC: 139 MMOL/L (ref 136–145)
TSH SERPL DL<=0.005 MIU/L-ACNC: 5.52 UIU/ML (ref 0.3–5)
WBC: 5.8 THOU/UL (ref 4–11)

## 2018-01-15 ASSESSMENT — MIFFLIN-ST. JEOR: SCORE: 1561.08

## 2018-01-16 ENCOUNTER — RECORDS - HEALTHEAST (OUTPATIENT)
Dept: ADMINISTRATIVE | Facility: OTHER | Age: 53
End: 2018-01-16

## 2018-01-16 ENCOUNTER — COMMUNICATION - HEALTHEAST (OUTPATIENT)
Dept: INTERNAL MEDICINE | Facility: CLINIC | Age: 53
End: 2018-01-16

## 2018-01-24 ENCOUNTER — RECORDS - HEALTHEAST (OUTPATIENT)
Dept: ADMINISTRATIVE | Facility: OTHER | Age: 53
End: 2018-01-24

## 2018-01-25 ENCOUNTER — HOSPITAL ENCOUNTER (OUTPATIENT)
Dept: CARDIOLOGY | Facility: HOSPITAL | Age: 53
Discharge: HOME | End: 2018-01-25
Attending: INTERNAL MEDICINE

## 2018-01-25 ENCOUNTER — RECORDS - HEALTHEAST (OUTPATIENT)
Dept: ADMINISTRATIVE | Facility: OTHER | Age: 53
End: 2018-01-25

## 2018-01-25 DIAGNOSIS — I48.91 AF (ATRIAL FIBRILLATION) (H): ICD-10-CM

## 2018-01-25 DIAGNOSIS — I45.9 HEART BLOCK: ICD-10-CM

## 2018-01-25 DIAGNOSIS — I42.9 CARDIOMYOPATHY (H): ICD-10-CM

## 2018-01-25 LAB
AORTIC ROOT: 3.3 CM
BSA FOR ECHO PROCEDURE: 1.87 M2
CV ECHO HEIGHT: 64 IN
CV ECHO WEIGHT: 171 LBS
DOP CALC LVOT AREA: 3.8 CM2
DOP CALC LVOT DIAMETER: 2.2 CM
DOP CALC LVOT PEAK VEL: 39.8 CM/S
DOP CALC LVOT STROKE VOLUME: 28.7 CM3
DOP CALCLVOT PEAK VEL VTI: 7.56 CM
EJECTION FRACTION: 19 % (ref 55–75)
FRACTIONAL SHORTENING: 9.3 % (ref 28–44)
INTERVENTRICULAR SEPTUM IN END DIASTOLE: 0.83 CM (ref 0.6–1)
IVS/PW RATIO: 0.8
LEFT ATRIUM SIZE: 5.7 CM
LEFT ATRIUM TO AORTIC ROOT RATIO: 1.73 NO UNITS
LEFT VENTRICLE DIASTOLIC VOLUME INDEX: 128.3 CM3/M2 (ref 34–74)
LEFT VENTRICLE DIASTOLIC VOLUME: 240 CM3 (ref 62–150)
LEFT VENTRICLE MASS INDEX: 114.5 G/M2
LEFT VENTRICLE SYSTOLIC VOLUME INDEX: 104.3 CM3/M2 (ref 11–31)
LEFT VENTRICLE SYSTOLIC VOLUME: 195 CM3 (ref 21–61)
LEFT VENTRICULAR INTERNAL DIMENSION IN DIASTOLE: 5.7 CM (ref 4.2–5.8)
LEFT VENTRICULAR INTERNAL DIMENSION IN SYSTOLE: 5.17 CM (ref 2.5–4)
LEFT VENTRICULAR MASS: 214.1 G
LEFT VENTRICULAR OUTFLOW TRACT MEAN GRADIENT: 0 MMHG
LEFT VENTRICULAR OUTFLOW TRACT MEAN VELOCITY: 26 CM/S
LEFT VENTRICULAR OUTFLOW TRACT PEAK GRADIENT: 1 MMHG
LEFT VENTRICULAR POSTERIOR WALL IN END DIASTOLE: 1.09 CM (ref 0.6–1)
LV STROKE VOLUME INDEX: 15.4 ML/M2
MV DECELERATION TIME: 243 MS
MV PEAK E VELOCITY: 117 CM/S
NUC REST DIASTOLIC VOLUME INDEX: 2736 LBS
NUC REST SYSTOLIC VOLUME INDEX: 64 IN
TRICUSPID REGURGITATION PEAK PRESSURE GRADIENT: 33.9 MMHG
TRICUSPID VALVE ANULAR PLANE SYSTOLIC EXCURSION: 1.3 CM
TRICUSPID VALVE PEAK REGURGITANT VELOCITY: 291 CM/S

## 2018-01-25 ASSESSMENT — MIFFLIN-ST. JEOR: SCORE: 1506.65

## 2018-01-26 ENCOUNTER — AMBULATORY - HEALTHEAST (OUTPATIENT)
Dept: CARDIOLOGY | Facility: CLINIC | Age: 53
End: 2018-01-26

## 2018-01-30 ENCOUNTER — COMMUNICATION - HEALTHEAST (OUTPATIENT)
Dept: INTERNAL MEDICINE | Facility: CLINIC | Age: 53
End: 2018-01-30

## 2018-01-30 DIAGNOSIS — N40.0 BPH (BENIGN PROSTATIC HYPERPLASIA): ICD-10-CM

## 2018-02-01 ENCOUNTER — AMBULATORY - HEALTHEAST (OUTPATIENT)
Dept: CARDIOLOGY | Facility: CLINIC | Age: 53
End: 2018-02-01

## 2018-02-01 DIAGNOSIS — I47.29 PAROXYSMAL VENTRICULAR TACHYCARDIA (H): ICD-10-CM

## 2018-02-01 DIAGNOSIS — I48.0 PAROXYSMAL ATRIAL FIBRILLATION (H): ICD-10-CM

## 2018-02-01 DIAGNOSIS — Z95.810 ICD (IMPLANTABLE CARDIOVERTER-DEFIBRILLATOR), BIVENTRICULAR, IN SITU: ICD-10-CM

## 2018-02-01 DIAGNOSIS — Q21.10 ASD (ATRIAL SEPTAL DEFECT): ICD-10-CM

## 2018-02-01 LAB — HCC DEVICE COMMENTS: NORMAL

## 2018-02-01 ASSESSMENT — MIFFLIN-ST. JEOR: SCORE: 1493.04

## 2018-02-02 ENCOUNTER — SURGERY - HEALTHEAST (OUTPATIENT)
Dept: CARDIOLOGY | Facility: CLINIC | Age: 53
End: 2018-02-02

## 2018-02-02 ENCOUNTER — AMBULATORY - HEALTHEAST (OUTPATIENT)
Dept: CARDIOLOGY | Facility: CLINIC | Age: 53
End: 2018-02-02

## 2018-02-02 DIAGNOSIS — I42.8 NON-ISCHEMIC CARDIOMYOPATHY (H): ICD-10-CM

## 2018-02-02 DIAGNOSIS — Z45.02 ICD (IMPLANTABLE CARDIOVERTER-DEFIBRILLATOR) BATTERY DEPLETION: ICD-10-CM

## 2018-02-02 LAB
ATRIAL RATE - MUSE: NORMAL BPM
DIASTOLIC BLOOD PRESSURE - MUSE: NORMAL MMHG
INTERPRETATION ECG - MUSE: NORMAL
P AXIS - MUSE: NORMAL DEGREES
PR INTERVAL - MUSE: NORMAL MS
QRS DURATION - MUSE: 178 MS
QT - MUSE: 532 MS
QTC - MUSE: 570 MS
R AXIS - MUSE: 207 DEGREES
SYSTOLIC BLOOD PRESSURE - MUSE: NORMAL MMHG
T AXIS - MUSE: -1 DEGREES
VENTRICULAR RATE- MUSE: 69 BPM

## 2018-02-07 ENCOUNTER — COMMUNICATION - HEALTHEAST (OUTPATIENT)
Dept: INTERNAL MEDICINE | Facility: CLINIC | Age: 53
End: 2018-02-07

## 2018-02-07 DIAGNOSIS — I50.41: ICD-10-CM

## 2018-02-07 DIAGNOSIS — I48.91 FIBRILLATION, ATRIAL (H): ICD-10-CM

## 2018-02-07 DIAGNOSIS — I47.20 VENTRICULAR TACHYCARDIA (H): ICD-10-CM

## 2018-02-07 DIAGNOSIS — I42.8 NICM (NONISCHEMIC CARDIOMYOPATHY) (H): ICD-10-CM

## 2018-02-09 ENCOUNTER — SURGERY - HEALTHEAST (OUTPATIENT)
Dept: CARDIOLOGY | Facility: CLINIC | Age: 53
End: 2018-02-09

## 2018-02-09 ASSESSMENT — MIFFLIN-ST. JEOR: SCORE: 1542.94

## 2018-02-12 ENCOUNTER — COMMUNICATION - HEALTHEAST (OUTPATIENT)
Dept: INTERNAL MEDICINE | Facility: CLINIC | Age: 53
End: 2018-02-12

## 2018-02-16 ENCOUNTER — COMMUNICATION - HEALTHEAST (OUTPATIENT)
Dept: CARDIOLOGY | Facility: CLINIC | Age: 53
End: 2018-02-16

## 2018-02-16 ENCOUNTER — AMBULATORY - HEALTHEAST (OUTPATIENT)
Dept: CARDIOLOGY | Facility: CLINIC | Age: 53
End: 2018-02-16

## 2018-02-16 DIAGNOSIS — Z95.810 ICD (IMPLANTABLE CARDIOVERTER-DEFIBRILLATOR), BIVENTRICULAR, IN SITU: ICD-10-CM

## 2018-02-16 LAB — HCC DEVICE COMMENTS: NORMAL

## 2018-02-16 ASSESSMENT — MIFFLIN-ST. JEOR: SCORE: 1536.63

## 2018-02-20 ENCOUNTER — COMMUNICATION - HEALTHEAST (OUTPATIENT)
Dept: INTERNAL MEDICINE | Facility: CLINIC | Age: 53
End: 2018-02-20

## 2018-02-21 ENCOUNTER — AMBULATORY - HEALTHEAST (OUTPATIENT)
Dept: LAB | Facility: CLINIC | Age: 53
End: 2018-02-21

## 2018-02-21 ENCOUNTER — COMMUNICATION - HEALTHEAST (OUTPATIENT)
Dept: INTERNAL MEDICINE | Facility: CLINIC | Age: 53
End: 2018-02-21

## 2018-02-21 DIAGNOSIS — Q21.10 ASD (ATRIAL SEPTAL DEFECT): ICD-10-CM

## 2018-02-21 DIAGNOSIS — I42.8 NON-ISCHEMIC CARDIOMYOPATHY (H): ICD-10-CM

## 2018-02-21 DIAGNOSIS — I48.91 ATRIAL FIBRILLATION (H): ICD-10-CM

## 2018-02-21 LAB — INR PPP: 2.7 (ref 0.9–1.1)

## 2018-03-02 ENCOUNTER — AMBULATORY - HEALTHEAST (OUTPATIENT)
Dept: CARDIOLOGY | Facility: CLINIC | Age: 53
End: 2018-03-02

## 2018-03-02 ENCOUNTER — RECORDS - HEALTHEAST (OUTPATIENT)
Dept: ADMINISTRATIVE | Facility: OTHER | Age: 53
End: 2018-03-02

## 2018-03-12 ENCOUNTER — COMMUNICATION - HEALTHEAST (OUTPATIENT)
Dept: INTERNAL MEDICINE | Facility: CLINIC | Age: 53
End: 2018-03-12

## 2018-03-12 DIAGNOSIS — I48.92 ATRIAL FLUTTER (H): ICD-10-CM

## 2018-03-12 DIAGNOSIS — I48.0 PAROXYSMAL ATRIAL FIBRILLATION (H): ICD-10-CM

## 2018-03-19 ENCOUNTER — AMBULATORY - HEALTHEAST (OUTPATIENT)
Dept: ALLERGY | Facility: CLINIC | Age: 53
End: 2018-03-19

## 2018-03-19 DIAGNOSIS — J45.40 MODERATE PERSISTENT ASTHMA WITHOUT COMPLICATION: ICD-10-CM

## 2018-03-23 ENCOUNTER — COMMUNICATION - HEALTHEAST (OUTPATIENT)
Dept: INTERNAL MEDICINE | Facility: CLINIC | Age: 53
End: 2018-03-23

## 2018-03-23 ENCOUNTER — AMBULATORY - HEALTHEAST (OUTPATIENT)
Dept: LAB | Facility: CLINIC | Age: 53
End: 2018-03-23

## 2018-03-23 DIAGNOSIS — I48.92 ATRIAL FLUTTER (H): ICD-10-CM

## 2018-03-23 DIAGNOSIS — I42.8 NON-ISCHEMIC CARDIOMYOPATHY (H): ICD-10-CM

## 2018-03-23 DIAGNOSIS — Q21.10 ASD (ATRIAL SEPTAL DEFECT): ICD-10-CM

## 2018-03-23 DIAGNOSIS — I48.0 PAROXYSMAL ATRIAL FIBRILLATION (H): ICD-10-CM

## 2018-03-23 LAB — INR PPP: 2.2 (ref 0.9–1.1)

## 2018-04-02 ENCOUNTER — OFFICE VISIT - HEALTHEAST (OUTPATIENT)
Dept: FAMILY MEDICINE | Facility: CLINIC | Age: 53
End: 2018-04-02

## 2018-04-02 DIAGNOSIS — R07.9 CHEST PAIN, UNSPECIFIED TYPE: ICD-10-CM

## 2018-04-02 DIAGNOSIS — R05.9 COUGH: ICD-10-CM

## 2018-04-02 LAB
ANION GAP SERPL CALCULATED.3IONS-SCNC: 11 MMOL/L (ref 5–18)
ATRIAL RATE - MUSE: 60 BPM
BASOPHILS # BLD AUTO: 0 THOU/UL (ref 0–0.2)
BASOPHILS NFR BLD AUTO: 1 % (ref 0–2)
BUN SERPL-MCNC: 15 MG/DL (ref 8–22)
CHLORIDE BLD-SCNC: 98 MMOL/L (ref 98–107)
CO2 SERPL-SCNC: 28 MMOL/L (ref 22–31)
CREAT SERPL-MCNC: 1.1 MG/DL (ref 0.8–1.5)
DIASTOLIC BLOOD PRESSURE - MUSE: NORMAL MMHG
EOSINOPHIL # BLD AUTO: 0.1 THOU/UL (ref 0–0.4)
EOSINOPHIL NFR BLD AUTO: 2 % (ref 0–6)
ERYTHROCYTE [DISTWIDTH] IN BLOOD BY AUTOMATED COUNT: 13.1 % (ref 11–14.5)
GLUCOSE BLD-MCNC: 91 MG/DL (ref 70–125)
HCT VFR BLD AUTO: 44.7 % (ref 40–54)
HGB BLD-MCNC: 14.9 G/DL (ref 14–18)
INTERPRETATION ECG - MUSE: NORMAL
LYMPHOCYTES # BLD AUTO: 1.4 THOU/UL (ref 0.8–4.4)
LYMPHOCYTES NFR BLD AUTO: 21 % (ref 20–40)
MCH RBC QN AUTO: 29.6 PG (ref 27–34)
MCHC RBC AUTO-ENTMCNC: 33.3 G/DL (ref 32–36)
MCV RBC AUTO: 89 FL (ref 80–100)
MONOCYTES # BLD AUTO: 0.9 THOU/UL (ref 0–0.9)
MONOCYTES NFR BLD AUTO: 13 % (ref 2–10)
NEUTROPHILS # BLD AUTO: 4.4 THOU/UL (ref 2–7.7)
NEUTROPHILS NFR BLD AUTO: 64 % (ref 50–70)
P AXIS - MUSE: NORMAL DEGREES
PLATELET # BLD AUTO: 218 THOU/UL (ref 140–440)
PMV BLD AUTO: 7.6 FL (ref 7–10)
POTASSIUM BLD-SCNC: 4.1 MMOL/L (ref 3.5–5.5)
PR INTERVAL - MUSE: NORMAL MS
QRS DURATION - MUSE: 174 MS
QT - MUSE: 550 MS
QTC - MUSE: 550 MS
R AXIS - MUSE: -37 DEGREES
RBC # BLD AUTO: 5.03 MILL/UL (ref 4.4–6.2)
SODIUM SERPL-SCNC: 137 MMOL/L (ref 136–145)
SYSTOLIC BLOOD PRESSURE - MUSE: NORMAL MMHG
T AXIS - MUSE: -20 DEGREES
VENTRICULAR RATE- MUSE: 60 BPM
WBC: 6.9 THOU/UL (ref 4–11)

## 2018-04-04 ENCOUNTER — RECORDS - HEALTHEAST (OUTPATIENT)
Dept: ADMINISTRATIVE | Facility: OTHER | Age: 53
End: 2018-04-04

## 2018-04-06 ENCOUNTER — RECORDS - HEALTHEAST (OUTPATIENT)
Dept: ADMINISTRATIVE | Facility: OTHER | Age: 53
End: 2018-04-06

## 2018-04-06 ENCOUNTER — AMBULATORY - HEALTHEAST (OUTPATIENT)
Dept: CARDIOLOGY | Facility: CLINIC | Age: 53
End: 2018-04-06

## 2018-04-09 ENCOUNTER — RECORDS - HEALTHEAST (OUTPATIENT)
Dept: ADMINISTRATIVE | Facility: OTHER | Age: 53
End: 2018-04-09

## 2018-04-12 ENCOUNTER — COMMUNICATION - HEALTHEAST (OUTPATIENT)
Dept: ANTICOAGULATION | Facility: CLINIC | Age: 53
End: 2018-04-12

## 2018-04-12 ENCOUNTER — OFFICE VISIT - HEALTHEAST (OUTPATIENT)
Dept: INTERNAL MEDICINE | Facility: CLINIC | Age: 53
End: 2018-04-12

## 2018-04-12 DIAGNOSIS — K22.70 BARRETT'S ESOPHAGUS WITHOUT DYSPLASIA: ICD-10-CM

## 2018-04-12 DIAGNOSIS — E55.9 VITAMIN D DEFICIENCY: ICD-10-CM

## 2018-04-12 DIAGNOSIS — I42.8 NON-ISCHEMIC CARDIOMYOPATHY (H): ICD-10-CM

## 2018-04-12 DIAGNOSIS — K51.90 ULCERATIVE COLITIS WITHOUT COMPLICATIONS, UNSPECIFIED LOCATION (H): ICD-10-CM

## 2018-04-12 DIAGNOSIS — I48.0 PAROXYSMAL ATRIAL FIBRILLATION (H): ICD-10-CM

## 2018-04-12 DIAGNOSIS — Q21.10 ASD (ATRIAL SEPTAL DEFECT): ICD-10-CM

## 2018-04-12 DIAGNOSIS — Z95.810 ICD (IMPLANTABLE CARDIOVERTER-DEFIBRILLATOR), BIVENTRICULAR, IN SITU: ICD-10-CM

## 2018-04-12 DIAGNOSIS — E03.2 HYPOTHYROIDISM DUE TO MEDICATION: ICD-10-CM

## 2018-04-12 DIAGNOSIS — I50.22 CHRONIC SYSTOLIC CHF (CONGESTIVE HEART FAILURE), NYHA CLASS 2 (H): ICD-10-CM

## 2018-04-12 DIAGNOSIS — I48.92 ATRIAL FLUTTER (H): ICD-10-CM

## 2018-04-12 LAB — INR PPP: 2.6 (ref 0.9–1.1)

## 2018-04-12 ASSESSMENT — MIFFLIN-ST. JEOR: SCORE: 1524.79

## 2018-04-18 ENCOUNTER — OFFICE VISIT - HEALTHEAST (OUTPATIENT)
Dept: CARDIOLOGY | Facility: CLINIC | Age: 53
End: 2018-04-18

## 2018-04-18 DIAGNOSIS — I42.9 CARDIOMYOPATHY (H): ICD-10-CM

## 2018-04-18 DIAGNOSIS — I47.20 VENTRICULAR TACHYCARDIA (H): ICD-10-CM

## 2018-04-18 DIAGNOSIS — I48.0 PAROXYSMAL ATRIAL FIBRILLATION (H): ICD-10-CM

## 2018-04-18 ASSESSMENT — MIFFLIN-ST. JEOR: SCORE: 1544.75

## 2018-04-30 ENCOUNTER — RECORDS - HEALTHEAST (OUTPATIENT)
Dept: ADMINISTRATIVE | Facility: OTHER | Age: 53
End: 2018-04-30

## 2018-04-30 ENCOUNTER — RECORDS - HEALTHEAST (OUTPATIENT)
Dept: BONE DENSITY | Facility: CLINIC | Age: 53
End: 2018-04-30

## 2018-04-30 DIAGNOSIS — K51.00 ULCERATIVE (CHRONIC) PANCOLITIS WITHOUT COMPLICATIONS (H): ICD-10-CM

## 2018-04-30 DIAGNOSIS — M89.9 DISORDER OF BONE, UNSPECIFIED: ICD-10-CM

## 2018-05-04 ENCOUNTER — COMMUNICATION - HEALTHEAST (OUTPATIENT)
Dept: ANTICOAGULATION | Facility: CLINIC | Age: 53
End: 2018-05-04

## 2018-05-08 ENCOUNTER — RECORDS - HEALTHEAST (OUTPATIENT)
Dept: ADMINISTRATIVE | Facility: OTHER | Age: 53
End: 2018-05-08

## 2018-05-09 ENCOUNTER — RECORDS - HEALTHEAST (OUTPATIENT)
Dept: ADMINISTRATIVE | Facility: OTHER | Age: 53
End: 2018-05-09

## 2018-05-12 ENCOUNTER — COMMUNICATION - HEALTHEAST (OUTPATIENT)
Dept: ALLERGY | Facility: CLINIC | Age: 53
End: 2018-05-12

## 2018-05-12 DIAGNOSIS — J45.40 MODERATE PERSISTENT ASTHMA WITHOUT COMPLICATION: ICD-10-CM

## 2018-05-15 ENCOUNTER — AMBULATORY - HEALTHEAST (OUTPATIENT)
Dept: CARDIOLOGY | Facility: CLINIC | Age: 53
End: 2018-05-15

## 2018-05-15 DIAGNOSIS — Z95.810 ICD (IMPLANTABLE CARDIOVERTER-DEFIBRILLATOR), BIVENTRICULAR, IN SITU: ICD-10-CM

## 2018-05-15 LAB
HCC DEVICE COMMENTS: NORMAL
HCC DEVICE IMPLANTING PROVIDER: NORMAL
HCC DEVICE MANUFACTURE: NORMAL
HCC DEVICE MODEL: NORMAL
HCC DEVICE SERIAL NUMBER: NORMAL
HCC DEVICE TYPE: NORMAL

## 2018-05-17 ENCOUNTER — COMMUNICATION - HEALTHEAST (OUTPATIENT)
Dept: INTERNAL MEDICINE | Facility: CLINIC | Age: 53
End: 2018-05-17

## 2018-05-17 ENCOUNTER — AMBULATORY - HEALTHEAST (OUTPATIENT)
Dept: LAB | Facility: CLINIC | Age: 53
End: 2018-05-17

## 2018-05-17 DIAGNOSIS — I42.8 NON-ISCHEMIC CARDIOMYOPATHY (H): ICD-10-CM

## 2018-05-17 DIAGNOSIS — Q21.10 ASD (ATRIAL SEPTAL DEFECT): ICD-10-CM

## 2018-05-17 DIAGNOSIS — I48.0 PAROXYSMAL ATRIAL FIBRILLATION (H): ICD-10-CM

## 2018-05-17 DIAGNOSIS — I48.92 ATRIAL FLUTTER (H): ICD-10-CM

## 2018-05-17 LAB — INR PPP: 2.6 (ref 0.9–1.1)

## 2018-05-18 ENCOUNTER — COMMUNICATION - HEALTHEAST (OUTPATIENT)
Dept: INTERNAL MEDICINE | Facility: CLINIC | Age: 53
End: 2018-05-18

## 2018-05-18 DIAGNOSIS — I50.22 CHRONIC SYSTOLIC CHF (CONGESTIVE HEART FAILURE), NYHA CLASS 2 (H): ICD-10-CM

## 2018-05-24 ENCOUNTER — COMMUNICATION - HEALTHEAST (OUTPATIENT)
Dept: INTERNAL MEDICINE | Facility: CLINIC | Age: 53
End: 2018-05-24

## 2018-05-24 DIAGNOSIS — I47.20 VENTRICULAR TACHYCARDIA (H): ICD-10-CM

## 2018-05-24 DIAGNOSIS — I48.91 FIBRILLATION, ATRIAL (H): ICD-10-CM

## 2018-05-24 DIAGNOSIS — I42.8 NICM (NONISCHEMIC CARDIOMYOPATHY) (H): ICD-10-CM

## 2018-05-24 DIAGNOSIS — I50.41: ICD-10-CM

## 2018-06-06 ENCOUNTER — COMMUNICATION - HEALTHEAST (OUTPATIENT)
Dept: INTERNAL MEDICINE | Facility: CLINIC | Age: 53
End: 2018-06-06

## 2018-06-06 DIAGNOSIS — I50.22 CHRONIC SYSTOLIC CHF (CONGESTIVE HEART FAILURE), NYHA CLASS 2 (H): ICD-10-CM

## 2018-06-10 ENCOUNTER — COMMUNICATION - HEALTHEAST (OUTPATIENT)
Dept: ALLERGY | Facility: CLINIC | Age: 53
End: 2018-06-10

## 2018-06-10 DIAGNOSIS — J45.40 MODERATE PERSISTENT ASTHMA WITHOUT COMPLICATION: ICD-10-CM

## 2018-06-14 ENCOUNTER — COMMUNICATION - HEALTHEAST (OUTPATIENT)
Dept: INTERNAL MEDICINE | Facility: CLINIC | Age: 53
End: 2018-06-14

## 2018-06-15 ENCOUNTER — COMMUNICATION - HEALTHEAST (OUTPATIENT)
Dept: INTERNAL MEDICINE | Facility: CLINIC | Age: 53
End: 2018-06-15

## 2018-06-15 DIAGNOSIS — J30.9 ALLERGIC RHINITIS: ICD-10-CM

## 2018-06-27 ENCOUNTER — AMBULATORY - HEALTHEAST (OUTPATIENT)
Dept: LAB | Facility: CLINIC | Age: 53
End: 2018-06-27

## 2018-06-27 ENCOUNTER — COMMUNICATION - HEALTHEAST (OUTPATIENT)
Dept: ANTICOAGULATION | Facility: CLINIC | Age: 53
End: 2018-06-27

## 2018-06-27 DIAGNOSIS — I48.0 PAROXYSMAL ATRIAL FIBRILLATION (H): ICD-10-CM

## 2018-06-27 DIAGNOSIS — I42.8 NON-ISCHEMIC CARDIOMYOPATHY (H): ICD-10-CM

## 2018-06-27 DIAGNOSIS — I48.92 ATRIAL FLUTTER (H): ICD-10-CM

## 2018-06-27 DIAGNOSIS — Q21.10 ASD (ATRIAL SEPTAL DEFECT): ICD-10-CM

## 2018-06-27 LAB — INR PPP: 4.5 (ref 0.9–1.1)

## 2018-06-30 ENCOUNTER — COMMUNICATION - HEALTHEAST (OUTPATIENT)
Dept: ALLERGY | Facility: CLINIC | Age: 53
End: 2018-06-30

## 2018-06-30 DIAGNOSIS — J45.40 MODERATE PERSISTENT ASTHMA WITHOUT COMPLICATION: ICD-10-CM

## 2018-07-02 ENCOUNTER — COMMUNICATION - HEALTHEAST (OUTPATIENT)
Dept: ANTICOAGULATION | Facility: CLINIC | Age: 53
End: 2018-07-02

## 2018-07-03 ENCOUNTER — COMMUNICATION - HEALTHEAST (OUTPATIENT)
Dept: INTERNAL MEDICINE | Facility: CLINIC | Age: 53
End: 2018-07-03

## 2018-07-03 ENCOUNTER — OFFICE VISIT - HEALTHEAST (OUTPATIENT)
Dept: INTERNAL MEDICINE | Facility: CLINIC | Age: 53
End: 2018-07-03

## 2018-07-03 DIAGNOSIS — F34.1 DYSTHYMIA: ICD-10-CM

## 2018-07-03 DIAGNOSIS — F41.9 ANXIETY: ICD-10-CM

## 2018-07-05 ENCOUNTER — OFFICE VISIT - HEALTHEAST (OUTPATIENT)
Dept: ALLERGY | Facility: CLINIC | Age: 53
End: 2018-07-05

## 2018-07-05 DIAGNOSIS — J45.40 MODERATE PERSISTENT ASTHMA WITHOUT COMPLICATION: ICD-10-CM

## 2018-07-05 DIAGNOSIS — J30.9 CHRONIC ALLERGIC RHINITIS, UNSPECIFIED SEASONALITY, UNSPECIFIED TRIGGER: ICD-10-CM

## 2018-07-05 ASSESSMENT — MIFFLIN-ST. JEOR: SCORE: 1533.86

## 2018-07-06 ENCOUNTER — AMBULATORY - HEALTHEAST (OUTPATIENT)
Dept: LAB | Facility: CLINIC | Age: 53
End: 2018-07-06

## 2018-07-06 ENCOUNTER — COMMUNICATION - HEALTHEAST (OUTPATIENT)
Dept: INTERNAL MEDICINE | Facility: CLINIC | Age: 53
End: 2018-07-06

## 2018-07-06 DIAGNOSIS — Q21.10 ASD (ATRIAL SEPTAL DEFECT): ICD-10-CM

## 2018-07-06 DIAGNOSIS — I48.0 PAROXYSMAL ATRIAL FIBRILLATION (H): ICD-10-CM

## 2018-07-06 DIAGNOSIS — I48.92 ATRIAL FLUTTER (H): ICD-10-CM

## 2018-07-06 LAB — INR PPP: 3.5 (ref 0.9–1.1)

## 2018-07-12 ENCOUNTER — AMBULATORY - HEALTHEAST (OUTPATIENT)
Dept: LAB | Facility: CLINIC | Age: 53
End: 2018-07-12

## 2018-07-12 ENCOUNTER — COMMUNICATION - HEALTHEAST (OUTPATIENT)
Dept: INTERNAL MEDICINE | Facility: CLINIC | Age: 53
End: 2018-07-12

## 2018-07-12 DIAGNOSIS — I48.0 PAROXYSMAL ATRIAL FIBRILLATION (H): ICD-10-CM

## 2018-07-12 DIAGNOSIS — Q21.10 ASD (ATRIAL SEPTAL DEFECT): ICD-10-CM

## 2018-07-12 DIAGNOSIS — I48.92 ATRIAL FLUTTER (H): ICD-10-CM

## 2018-07-12 LAB — INR PPP: 1.9 (ref 0.9–1.1)

## 2018-07-19 ENCOUNTER — COMMUNICATION - HEALTHEAST (OUTPATIENT)
Dept: ADMINISTRATIVE | Facility: CLINIC | Age: 53
End: 2018-07-19

## 2018-07-25 ENCOUNTER — COMMUNICATION - HEALTHEAST (OUTPATIENT)
Dept: INTERNAL MEDICINE | Facility: CLINIC | Age: 53
End: 2018-07-25

## 2018-07-26 ENCOUNTER — COMMUNICATION - HEALTHEAST (OUTPATIENT)
Dept: INTERNAL MEDICINE | Facility: CLINIC | Age: 53
End: 2018-07-26

## 2018-07-26 DIAGNOSIS — I48.91 ATRIAL FIBRILLATION (H): ICD-10-CM

## 2018-07-26 DIAGNOSIS — I48.92 ATRIAL FLUTTER (H): ICD-10-CM

## 2018-08-02 ENCOUNTER — COMMUNICATION - HEALTHEAST (OUTPATIENT)
Dept: INTERNAL MEDICINE | Facility: CLINIC | Age: 53
End: 2018-08-02

## 2018-08-07 ENCOUNTER — TRANSFERRED RECORDS (OUTPATIENT)
Dept: HEALTH INFORMATION MANAGEMENT | Facility: CLINIC | Age: 53
End: 2018-08-07

## 2018-08-13 ENCOUNTER — COMMUNICATION - HEALTHEAST (OUTPATIENT)
Dept: ANTICOAGULATION | Facility: CLINIC | Age: 53
End: 2018-08-13

## 2018-08-13 DIAGNOSIS — Q21.10 ASD (ATRIAL SEPTAL DEFECT): ICD-10-CM

## 2018-08-14 ENCOUNTER — COMMUNICATION - HEALTHEAST (OUTPATIENT)
Dept: CARE COORDINATION | Facility: CLINIC | Age: 53
End: 2018-08-14

## 2018-08-15 ENCOUNTER — OFFICE VISIT - HEALTHEAST (OUTPATIENT)
Dept: CARDIOLOGY | Facility: CLINIC | Age: 53
End: 2018-08-15

## 2018-08-15 ENCOUNTER — AMBULATORY - HEALTHEAST (OUTPATIENT)
Dept: CARDIOLOGY | Facility: CLINIC | Age: 53
End: 2018-08-15

## 2018-08-15 DIAGNOSIS — I50.23 ACUTE ON CHRONIC SYSTOLIC CONGESTIVE HEART FAILURE (H): ICD-10-CM

## 2018-08-15 LAB
ANION GAP SERPL CALCULATED.3IONS-SCNC: 14 MMOL/L (ref 5–18)
BUN SERPL-MCNC: 14 MG/DL (ref 8–22)
CALCIUM SERPL-MCNC: 9.3 MG/DL (ref 8.5–10.5)
CHLORIDE BLD-SCNC: 100 MMOL/L (ref 98–107)
CO2 SERPL-SCNC: 23 MMOL/L (ref 22–31)
CREAT SERPL-MCNC: 1.36 MG/DL (ref 0.7–1.3)
GFR SERPL CREATININE-BSD FRML MDRD: 54 ML/MIN/1.73M2
GLUCOSE BLD-MCNC: 142 MG/DL (ref 70–125)
POTASSIUM BLD-SCNC: 4.5 MMOL/L (ref 3.5–5)
SODIUM SERPL-SCNC: 137 MMOL/L (ref 136–145)

## 2018-08-15 ASSESSMENT — MIFFLIN-ST. JEOR: SCORE: 1466.96

## 2018-08-16 ENCOUNTER — OFFICE VISIT - HEALTHEAST (OUTPATIENT)
Dept: INTERNAL MEDICINE | Facility: CLINIC | Age: 53
End: 2018-08-16

## 2018-08-16 DIAGNOSIS — K51.90 ULCERATIVE COLITIS WITHOUT COMPLICATIONS, UNSPECIFIED LOCATION (H): ICD-10-CM

## 2018-08-16 DIAGNOSIS — K22.70 BARRETT'S ESOPHAGUS WITHOUT DYSPLASIA: ICD-10-CM

## 2018-08-16 DIAGNOSIS — Z95.810 ICD (IMPLANTABLE CARDIOVERTER-DEFIBRILLATOR), BIVENTRICULAR, IN SITU: ICD-10-CM

## 2018-08-16 DIAGNOSIS — I42.8 NON-ISCHEMIC CARDIOMYOPATHY (H): ICD-10-CM

## 2018-08-16 DIAGNOSIS — I48.0 PAROXYSMAL ATRIAL FIBRILLATION (H): ICD-10-CM

## 2018-08-16 DIAGNOSIS — M81.0 OSTEOPOROSIS, UNSPECIFIED OSTEOPOROSIS TYPE, UNSPECIFIED PATHOLOGICAL FRACTURE PRESENCE: ICD-10-CM

## 2018-08-16 DIAGNOSIS — E03.9 HYPOTHYROIDISM, UNSPECIFIED TYPE: ICD-10-CM

## 2018-08-16 DIAGNOSIS — J45.30 MILD PERSISTENT ASTHMA WITHOUT COMPLICATION: ICD-10-CM

## 2018-08-16 ASSESSMENT — MIFFLIN-ST. JEOR: SCORE: 1471.49

## 2018-08-20 ENCOUNTER — COMMUNICATION - HEALTHEAST (OUTPATIENT)
Dept: CARDIOLOGY | Facility: CLINIC | Age: 53
End: 2018-08-20

## 2018-08-22 ENCOUNTER — AMBULATORY - HEALTHEAST (OUTPATIENT)
Dept: LAB | Facility: CLINIC | Age: 53
End: 2018-08-22

## 2018-08-22 ENCOUNTER — COMMUNICATION - HEALTHEAST (OUTPATIENT)
Dept: ANTICOAGULATION | Facility: CLINIC | Age: 53
End: 2018-08-22

## 2018-08-22 DIAGNOSIS — Q21.10 ASD (ATRIAL SEPTAL DEFECT): ICD-10-CM

## 2018-08-22 DIAGNOSIS — I48.92 ATRIAL FLUTTER (H): ICD-10-CM

## 2018-08-22 DIAGNOSIS — I48.0 PAROXYSMAL ATRIAL FIBRILLATION (H): ICD-10-CM

## 2018-08-22 DIAGNOSIS — I48.91 ATRIAL FIBRILLATION (H): ICD-10-CM

## 2018-08-22 LAB — INR PPP: 2.4 (ref 0.9–1.1)

## 2018-08-29 ENCOUNTER — MEDICAL CORRESPONDENCE (OUTPATIENT)
Dept: HEALTH INFORMATION MANAGEMENT | Facility: CLINIC | Age: 53
End: 2018-08-29

## 2018-08-29 ENCOUNTER — COMMUNICATION - HEALTHEAST (OUTPATIENT)
Dept: CARDIOLOGY | Facility: CLINIC | Age: 53
End: 2018-08-29

## 2018-08-29 ENCOUNTER — TRANSFERRED RECORDS (OUTPATIENT)
Dept: HEALTH INFORMATION MANAGEMENT | Facility: CLINIC | Age: 53
End: 2018-08-29

## 2018-08-29 ENCOUNTER — OFFICE VISIT - HEALTHEAST (OUTPATIENT)
Dept: CARDIOLOGY | Facility: CLINIC | Age: 53
End: 2018-08-29

## 2018-08-29 DIAGNOSIS — I50.23 ACUTE ON CHRONIC SYSTOLIC CONGESTIVE HEART FAILURE (H): ICD-10-CM

## 2018-08-29 LAB
ANION GAP SERPL CALCULATED.3IONS-SCNC: 12 MMOL/L (ref 5–18)
BUN SERPL-MCNC: 23 MG/DL (ref 8–22)
CALCIUM SERPL-MCNC: 9.4 MG/DL (ref 8.5–10.5)
CHLORIDE BLD-SCNC: 93 MMOL/L (ref 98–107)
CO2 SERPL-SCNC: 27 MMOL/L (ref 22–31)
CREAT SERPL-MCNC: 1.1 MG/DL (ref 0.7–1.3)
GFR SERPL CREATININE-BSD FRML MDRD: >60 ML/MIN/1.73M2
GLUCOSE BLD-MCNC: 112 MG/DL (ref 70–125)
POTASSIUM BLD-SCNC: 5.1 MMOL/L (ref 3.5–5)
SODIUM SERPL-SCNC: 132 MMOL/L (ref 136–145)

## 2018-08-29 ASSESSMENT — MIFFLIN-ST. JEOR: SCORE: 1457.88

## 2018-09-04 ENCOUNTER — COMMUNICATION - HEALTHEAST (OUTPATIENT)
Dept: INTERNAL MEDICINE | Facility: CLINIC | Age: 53
End: 2018-09-04

## 2018-09-04 DIAGNOSIS — F34.1 DYSTHYMIA: ICD-10-CM

## 2018-09-12 ENCOUNTER — COMMUNICATION - HEALTHEAST (OUTPATIENT)
Dept: ANTICOAGULATION | Facility: CLINIC | Age: 53
End: 2018-09-12

## 2018-09-13 ENCOUNTER — AMBULATORY - HEALTHEAST (OUTPATIENT)
Dept: CARDIOLOGY | Facility: CLINIC | Age: 53
End: 2018-09-13

## 2018-09-13 ENCOUNTER — COMMUNICATION - HEALTHEAST (OUTPATIENT)
Dept: CARDIOLOGY | Facility: CLINIC | Age: 53
End: 2018-09-13

## 2018-09-13 DIAGNOSIS — Z95.810 ICD (IMPLANTABLE CARDIOVERTER-DEFIBRILLATOR), BIVENTRICULAR, IN SITU: ICD-10-CM

## 2018-09-13 DIAGNOSIS — K51.90 ULCERATIVE COLITIS WITHOUT COMPLICATIONS, UNSPECIFIED LOCATION (H): ICD-10-CM

## 2018-09-13 DIAGNOSIS — I50.23 ACUTE ON CHRONIC SYSTOLIC CONGESTIVE HEART FAILURE (H): ICD-10-CM

## 2018-09-13 LAB
ANION GAP SERPL CALCULATED.3IONS-SCNC: 12 MMOL/L (ref 5–18)
BUN SERPL-MCNC: 33 MG/DL (ref 8–22)
CALCIUM SERPL-MCNC: 9.4 MG/DL (ref 8.5–10.5)
CHLORIDE BLD-SCNC: 96 MMOL/L (ref 98–107)
CO2 SERPL-SCNC: 25 MMOL/L (ref 22–31)
CREAT SERPL-MCNC: 1.03 MG/DL (ref 0.7–1.3)
GFR SERPL CREATININE-BSD FRML MDRD: >60 ML/MIN/1.73M2
GLUCOSE BLD-MCNC: 66 MG/DL (ref 70–125)
HCC DEVICE COMMENTS: NORMAL
HCC DEVICE IMPLANTING PROVIDER: NORMAL
HCC DEVICE MANUFACTURE: NORMAL
HCC DEVICE MODEL: NORMAL
HCC DEVICE SERIAL NUMBER: NORMAL
HCC DEVICE TYPE: NORMAL
POTASSIUM BLD-SCNC: 4.5 MMOL/L (ref 3.5–5)
SODIUM SERPL-SCNC: 133 MMOL/L (ref 136–145)

## 2018-09-13 ASSESSMENT — MIFFLIN-ST. JEOR: SCORE: 1471.49

## 2018-09-14 ENCOUNTER — COMMUNICATION - HEALTHEAST (OUTPATIENT)
Dept: CARDIOLOGY | Facility: CLINIC | Age: 53
End: 2018-09-14

## 2018-09-18 ENCOUNTER — CARE COORDINATION (OUTPATIENT)
Dept: CARDIOLOGY | Facility: CLINIC | Age: 53
End: 2018-09-18

## 2018-09-18 NOTE — PROGRESS NOTES
Referral- Heart Failure    DEMOGRAPHICS       Demographic Information on Everton Larios:    Everton Larios  Gender: male  : 1965  [address] [address] [phone] (home)     Medical Record: [id number]  Social Security Number: [id number]  Pharmacy: Data Unavailable  Primary Care Provider: No primary care provider on file.    Parent's names are: Data Unavailable (mother) and Data Unavailable (father).    Insurance: No coverage found.        REFERRAL INFORMATION       Referring Provider: Bhumika gregory  Referring Clinic: [facility]  Referring Contact Info: [address], [phone], [phone]  Referring Diagnosis: Advanced Heart Failure  Referring Information:   Urgency of Referral:   SPOC Notes: Tried calling but did not find patient home.     RECORDS REQUESTED FROM:       Clinic name Records Requested Records Status Imaging Status   [facility]  In process In process                   PROBLEM/MEDICATION LIST:           Patient Reported Sx          Symptom Questions  Answer  Additional Comments    Any Swelling  No   Not since discharge 6-weeks ago      Dizziness or Lightheadedness   No  Not since discharge 6-weeks ago   Fatigue   ?? Some days needs a nap but not most     Weakness    YES   In legs, but getting better after discharge   Tachycardia/Racing/Throbbing  No      Mental Dullness or Fogginess   No Not after discharge   Cough/Hack  No  Not after discharge   Shortness of Breath   No Not after discharge   SOB Worse When Laying Down  No  Not after discharge   Do you Sleep in Bed and If Yes How Many Pillows   No   Not after discharge   Chest Pain No Not after discharge   RED FLAG ITEMS     Ever been on IV inotropes     Are they still on inotropes     What's their must current EF     What's the LVIDd on echo     What's their last blood sodium     What's their most recent BP     What's their most recent HR     Hospitalizations X6m              PLAN:

## 2018-09-20 ENCOUNTER — COMMUNICATION - HEALTHEAST (OUTPATIENT)
Dept: ANTICOAGULATION | Facility: CLINIC | Age: 53
End: 2018-09-20

## 2018-09-20 ENCOUNTER — OFFICE VISIT - HEALTHEAST (OUTPATIENT)
Dept: INTERNAL MEDICINE | Facility: CLINIC | Age: 53
End: 2018-09-20

## 2018-09-20 DIAGNOSIS — Z51.81 MEDICATION MONITORING ENCOUNTER: ICD-10-CM

## 2018-09-20 DIAGNOSIS — F34.1 DYSTHYMIA: ICD-10-CM

## 2018-09-20 DIAGNOSIS — K51.90 ULCERATIVE COLITIS WITHOUT COMPLICATIONS, UNSPECIFIED LOCATION (H): ICD-10-CM

## 2018-09-20 DIAGNOSIS — I48.0 PAROXYSMAL ATRIAL FIBRILLATION (H): ICD-10-CM

## 2018-09-20 DIAGNOSIS — I48.92 ATRIAL FLUTTER (H): ICD-10-CM

## 2018-09-20 DIAGNOSIS — E03.9 HYPOTHYROIDISM, UNSPECIFIED TYPE: ICD-10-CM

## 2018-09-20 DIAGNOSIS — I50.22 SYSTOLIC CHF, CHRONIC (H): ICD-10-CM

## 2018-09-20 DIAGNOSIS — Q21.10 ASD (ATRIAL SEPTAL DEFECT): ICD-10-CM

## 2018-09-20 DIAGNOSIS — I47.29 PAROXYSMAL VENTRICULAR TACHYCARDIA (H): ICD-10-CM

## 2018-09-20 DIAGNOSIS — Z95.810 ICD (IMPLANTABLE CARDIOVERTER-DEFIBRILLATOR), BIVENTRICULAR, IN SITU: ICD-10-CM

## 2018-09-20 DIAGNOSIS — J45.30 MILD PERSISTENT ASTHMA WITHOUT COMPLICATION: ICD-10-CM

## 2018-09-20 DIAGNOSIS — K22.70 BARRETT'S ESOPHAGUS WITHOUT DYSPLASIA: ICD-10-CM

## 2018-09-20 LAB
ANION GAP SERPL CALCULATED.3IONS-SCNC: 13 MMOL/L (ref 5–18)
BUN SERPL-MCNC: 20 MG/DL (ref 8–22)
CALCIUM SERPL-MCNC: 9.5 MG/DL (ref 8.5–10.5)
CHLORIDE BLD-SCNC: 96 MMOL/L (ref 98–107)
CO2 SERPL-SCNC: 27 MMOL/L (ref 22–31)
CREAT SERPL-MCNC: 1.13 MG/DL (ref 0.7–1.3)
GFR SERPL CREATININE-BSD FRML MDRD: >60 ML/MIN/1.73M2
GLUCOSE BLD-MCNC: 103 MG/DL (ref 70–125)
INR PPP: 1.9 (ref 0.9–1.1)
POTASSIUM BLD-SCNC: 4.1 MMOL/L (ref 3.5–5)
SODIUM SERPL-SCNC: 136 MMOL/L (ref 136–145)
TSH SERPL DL<=0.005 MIU/L-ACNC: 13.81 UIU/ML (ref 0.3–5)

## 2018-09-20 ASSESSMENT — MIFFLIN-ST. JEOR: SCORE: 1476.03

## 2018-09-21 ENCOUNTER — COMMUNICATION - HEALTHEAST (OUTPATIENT)
Dept: INTERNAL MEDICINE | Facility: CLINIC | Age: 53
End: 2018-09-21

## 2018-09-21 DIAGNOSIS — E03.9 HYPOTHYROIDISM, UNSPECIFIED TYPE: ICD-10-CM

## 2018-09-21 NOTE — PROGRESS NOTES
Called and left message for patient to return call to set up appointments with one of the advanced heart failure providers. Will wait for return call.  If we don't connect today, will call him on Monday.

## 2018-09-25 ENCOUNTER — TRANSFERRED RECORDS (OUTPATIENT)
Dept: HEALTH INFORMATION MANAGEMENT | Facility: CLINIC | Age: 53
End: 2018-09-25

## 2018-09-27 ENCOUNTER — COMMUNICATION - HEALTHEAST (OUTPATIENT)
Dept: ALLERGY | Facility: CLINIC | Age: 53
End: 2018-09-27

## 2018-09-27 DIAGNOSIS — J45.40 MODERATE PERSISTENT ASTHMA WITHOUT COMPLICATION: ICD-10-CM

## 2018-10-02 ENCOUNTER — OFFICE VISIT - HEALTHEAST (OUTPATIENT)
Dept: CARDIOLOGY | Facility: CLINIC | Age: 53
End: 2018-10-02

## 2018-10-02 DIAGNOSIS — I48.0 PAROXYSMAL ATRIAL FIBRILLATION (H): ICD-10-CM

## 2018-10-02 DIAGNOSIS — I42.8 OTHER CARDIOMYOPATHY (H): ICD-10-CM

## 2018-10-02 DIAGNOSIS — I47.29 NSVT (NONSUSTAINED VENTRICULAR TACHYCARDIA) (H): ICD-10-CM

## 2018-10-02 ASSESSMENT — MIFFLIN-ST. JEOR: SCORE: 1503.24

## 2018-10-04 ENCOUNTER — CARE COORDINATION (OUTPATIENT)
Dept: CARDIOLOGY | Facility: CLINIC | Age: 53
End: 2018-10-04

## 2018-10-04 DIAGNOSIS — Z86.79 S/P ABLATION OF ATRIAL FIBRILLATION: ICD-10-CM

## 2018-10-04 DIAGNOSIS — R11.0 NAUSEA: Primary | ICD-10-CM

## 2018-10-04 DIAGNOSIS — N40.0 BENIGN PROSTATIC HYPERPLASIA, UNSPECIFIED WHETHER LOWER URINARY TRACT SYMPTOMS PRESENT: ICD-10-CM

## 2018-10-04 DIAGNOSIS — Z98.890 S/P ABLATION OF ATRIAL FIBRILLATION: ICD-10-CM

## 2018-10-04 DIAGNOSIS — J45.20 INTERMITTENT ASTHMA WITHOUT COMPLICATION, UNSPECIFIED ASTHMA SEVERITY: ICD-10-CM

## 2018-10-04 DIAGNOSIS — K51.919 ULCERATIVE COLITIS WITH COMPLICATION, UNSPECIFIED LOCATION (H): ICD-10-CM

## 2018-10-04 DIAGNOSIS — F32.A DEPRESSION, UNSPECIFIED DEPRESSION TYPE: ICD-10-CM

## 2018-10-04 DIAGNOSIS — M19.90 OSTEOARTHRITIS, UNSPECIFIED OSTEOARTHRITIS TYPE, UNSPECIFIED SITE: ICD-10-CM

## 2018-10-04 DIAGNOSIS — K59.00 CONSTIPATION, UNSPECIFIED CONSTIPATION TYPE: ICD-10-CM

## 2018-10-04 DIAGNOSIS — E87.6 HYPOKALEMIA: ICD-10-CM

## 2018-10-04 DIAGNOSIS — E03.2 HYPOTHYROIDISM DUE TO MEDICATION: ICD-10-CM

## 2018-10-04 DIAGNOSIS — I50.22 CHRONIC SYSTOLIC HEART FAILURE (H): ICD-10-CM

## 2018-10-04 DIAGNOSIS — J31.0 CHRONIC RHINITIS: ICD-10-CM

## 2018-10-04 DIAGNOSIS — I48.0 PAROXYSMAL ATRIAL FIBRILLATION (H): ICD-10-CM

## 2018-10-04 PROBLEM — Z79.899 ON AMIODARONE THERAPY: Status: ACTIVE | Noted: 2018-10-04

## 2018-10-04 PROBLEM — I42.8 NONISCHEMIC CARDIOMYOPATHY (H): Status: ACTIVE | Noted: 2018-10-04

## 2018-10-04 PROBLEM — Z87.74 H/O CONGENITAL ATRIAL SEPTAL DEFECT (ASD) REPAIR: Status: ACTIVE | Noted: 2018-10-04

## 2018-10-04 PROBLEM — K51.90 ULCERATIVE COLITIS (H): Status: ACTIVE | Noted: 2018-10-04

## 2018-10-04 PROBLEM — K22.70 BARRETT'S ESOPHAGUS: Status: ACTIVE | Noted: 2018-10-04

## 2018-10-04 PROBLEM — I47.20 VENTRICULAR TACHYCARDIA (H): Status: ACTIVE | Noted: 2018-10-04

## 2018-10-04 PROBLEM — Z95.810 ICD (IMPLANTABLE CARDIOVERTER-DEFIBRILLATOR) IN PLACE: Status: ACTIVE | Noted: 2018-10-04

## 2018-10-04 RX ORDER — SIMETHICONE 125 MG
1 CAPSULE ORAL 4 TIMES DAILY
Status: ON HOLD | COMMUNITY
Start: 2018-10-04 | End: 2018-11-16

## 2018-10-04 RX ORDER — DULOXETIN HYDROCHLORIDE 30 MG/1
30 CAPSULE, DELAYED RELEASE ORAL DAILY
Status: ON HOLD | COMMUNITY
Start: 2018-10-04 | End: 2018-11-16

## 2018-10-04 RX ORDER — OMEPRAZOLE 40 MG/1
40 CAPSULE, DELAYED RELEASE ORAL DAILY
Status: ON HOLD | COMMUNITY
Start: 2018-10-04 | End: 2019-04-01

## 2018-10-04 RX ORDER — MESALAMINE 800 MG/1
2400 TABLET, DELAYED RELEASE ORAL 2 TIMES DAILY
COMMUNITY
Start: 2018-10-04 | End: 2019-04-26

## 2018-10-04 RX ORDER — TAMSULOSIN HYDROCHLORIDE 0.4 MG/1
0.4 CAPSULE ORAL
Status: ON HOLD | COMMUNITY
Start: 2018-10-04 | End: 2019-04-01

## 2018-10-04 RX ORDER — WARFARIN SODIUM 5 MG/1
TABLET ORAL
Status: ON HOLD | COMMUNITY
Start: 2018-10-04 | End: 2019-04-01

## 2018-10-04 RX ORDER — SPIRONOLACTONE 25 MG/1
25 TABLET ORAL DAILY
Status: ON HOLD | COMMUNITY
Start: 2018-10-04 | End: 2018-11-16 | Stop reason: DRUGHIGH

## 2018-10-04 RX ORDER — AZELASTINE 1 MG/ML
2 SPRAY, METERED NASAL 2 TIMES DAILY
Status: ON HOLD | COMMUNITY
Start: 2018-10-04 | End: 2019-03-01

## 2018-10-04 RX ORDER — DICYCLOMINE HYDROCHLORIDE 10 MG/1
10 CAPSULE ORAL 3 TIMES DAILY PRN
Status: ON HOLD | COMMUNITY
Start: 2018-10-04 | End: 2018-11-02

## 2018-10-04 RX ORDER — ONDANSETRON 4 MG/1
4 TABLET, ORALLY DISINTEGRATING ORAL EVERY 8 HOURS PRN
Status: ON HOLD | COMMUNITY
Start: 2018-10-04 | End: 2019-04-01

## 2018-10-04 RX ORDER — POLYETHYLENE GLYCOL 3350 17 G/17G
17 POWDER, FOR SOLUTION ORAL 4 TIMES DAILY
Status: ON HOLD | COMMUNITY
Start: 2018-10-04 | End: 2018-11-16

## 2018-10-04 RX ORDER — ALBUTEROL SULFATE 90 UG/1
2 AEROSOL, METERED RESPIRATORY (INHALATION) EVERY 6 HOURS PRN
COMMUNITY
Start: 2018-10-04 | End: 2022-10-26

## 2018-10-04 RX ORDER — FUROSEMIDE 80 MG
80 TABLET ORAL 2 TIMES DAILY
Status: ON HOLD | COMMUNITY
Start: 2018-11-15 | End: 2018-11-23

## 2018-10-04 RX ORDER — AMIODARONE HYDROCHLORIDE 200 MG/1
200 TABLET ORAL DAILY
Status: ON HOLD | COMMUNITY
Start: 2018-10-04 | End: 2019-04-01

## 2018-10-04 RX ORDER — CARVEDILOL 12.5 MG/1
6.25 TABLET ORAL 2 TIMES DAILY WITH MEALS
Status: ON HOLD | COMMUNITY
Start: 2018-11-15 | End: 2018-11-16

## 2018-10-04 RX ORDER — MONTELUKAST SODIUM 10 MG/1
10 TABLET ORAL AT BEDTIME
COMMUNITY
Start: 2018-10-04 | End: 2019-10-03

## 2018-10-04 RX ORDER — POTASSIUM CHLORIDE 1500 MG/1
20 TABLET, EXTENDED RELEASE ORAL DAILY
COMMUNITY
Start: 2018-10-04 | End: 2018-10-15

## 2018-10-04 RX ORDER — LEVOTHYROXINE SODIUM 50 UG/1
50 TABLET ORAL DAILY
Status: ON HOLD | COMMUNITY
Start: 2018-10-04 | End: 2018-11-16 | Stop reason: DRUGHIGH

## 2018-10-05 NOTE — PROGRESS NOTES
Patient is referred from Bhumika Vera CNP, Dr. Jaycob Casas at [facility] in [place], [place].  Pt is a 54 y/o male with nonischemic cardiomyopathy, systolic heart failure with EF of 16%.  Recent hospitalization in August for HF, received IV diuretics, losing total of 20 lbs fluid. Records to be scanned into EPIC.  Unable to obtain CPX prior to appt due to lack of openings.    Cardiac PMHx   Congenital ASD repair at age 3  Anomalous superior vena cava   ICD 2008 with generator change in 2/2018.   Paroxysmal atrial fib with h/o afib ablation.    Cardiac tests  Echocardiogram 8/2018 - LVEF 16%; mild- mod MR (notes report EF has been low for some time)  CT angiogram 2011 - showed no CAD    Previous smoker - quit 2008  ETOH - drinks approx 2 drinks up to 3x/week when out with friends  Works part-time - [employer]

## 2018-10-09 DIAGNOSIS — I48.0 PAROXYSMAL ATRIAL FIBRILLATION (H): Primary | ICD-10-CM

## 2018-10-09 DIAGNOSIS — Z79.899 ON AMIODARONE THERAPY: ICD-10-CM

## 2018-10-09 DIAGNOSIS — I50.22 CHRONIC SYSTOLIC HEART FAILURE (H): ICD-10-CM

## 2018-10-09 DIAGNOSIS — I42.8 NONISCHEMIC CARDIOMYOPATHY (H): ICD-10-CM

## 2018-10-11 ENCOUNTER — RECORDS - HEALTHEAST (OUTPATIENT)
Dept: ADMINISTRATIVE | Facility: OTHER | Age: 53
End: 2018-10-11

## 2018-10-11 ENCOUNTER — COMMUNICATION - HEALTHEAST (OUTPATIENT)
Dept: ANTICOAGULATION | Facility: CLINIC | Age: 53
End: 2018-10-11

## 2018-10-11 ENCOUNTER — ALLIED HEALTH/NURSE VISIT (OUTPATIENT)
Dept: CARDIOLOGY | Facility: CLINIC | Age: 53
End: 2018-10-11
Attending: INTERNAL MEDICINE
Payer: MEDICARE

## 2018-10-11 VITALS
OXYGEN SATURATION: 95 % | DIASTOLIC BLOOD PRESSURE: 62 MMHG | HEART RATE: 54 BPM | WEIGHT: 159.8 LBS | BODY MASS INDEX: 24.22 KG/M2 | HEIGHT: 68 IN | SYSTOLIC BLOOD PRESSURE: 99 MMHG

## 2018-10-11 DIAGNOSIS — I42.8 NONISCHEMIC CARDIOMYOPATHY (H): Primary | ICD-10-CM

## 2018-10-11 DIAGNOSIS — I48.0 PAROXYSMAL ATRIAL FIBRILLATION (H): ICD-10-CM

## 2018-10-11 DIAGNOSIS — I50.22 CHRONIC SYSTOLIC HEART FAILURE (H): Primary | ICD-10-CM

## 2018-10-11 DIAGNOSIS — I42.8 NONISCHEMIC CARDIOMYOPATHY (H): ICD-10-CM

## 2018-10-11 DIAGNOSIS — I50.22 CHRONIC SYSTOLIC HEART FAILURE (H): ICD-10-CM

## 2018-10-11 DIAGNOSIS — Z79.899 ON AMIODARONE THERAPY: ICD-10-CM

## 2018-10-11 DIAGNOSIS — I51.89 OTHER ILL-DEFINED HEART DISEASES: ICD-10-CM

## 2018-10-11 LAB
ALBUMIN SERPL-MCNC: 3.2 G/DL (ref 3.4–5)
ALP SERPL-CCNC: 152 U/L (ref 40–150)
ALT SERPL W P-5'-P-CCNC: 27 U/L (ref 0–70)
ANION GAP SERPL CALCULATED.3IONS-SCNC: 6 MMOL/L (ref 3–14)
AST SERPL W P-5'-P-CCNC: 27 U/L (ref 0–45)
BILIRUB SERPL-MCNC: 2.1 MG/DL (ref 0.2–1.3)
BUN SERPL-MCNC: 12 MG/DL (ref 7–30)
CALCIUM SERPL-MCNC: 8.6 MG/DL (ref 8.5–10.1)
CHLORIDE SERPL-SCNC: 92 MMOL/L (ref 94–109)
CO2 SERPL-SCNC: 35 MMOL/L (ref 20–32)
CREAT SERPL-MCNC: 1.1 MG/DL (ref 0.66–1.25)
ERYTHROCYTE [DISTWIDTH] IN BLOOD BY AUTOMATED COUNT: 18.6 % (ref 10–15)
GFR SERPL CREATININE-BSD FRML MDRD: 69 ML/MIN/1.7M2
GLUCOSE SERPL-MCNC: 107 MG/DL (ref 70–99)
HCT VFR BLD AUTO: 42.9 % (ref 40–53)
HGB BLD-MCNC: 13.7 G/DL (ref 13.3–17.7)
INR PPP: 2.75 (ref 0.86–1.14)
INR PPP: 2.75 (ref 0.9–1.1)
IRON SATN MFR SERPL: 14 % (ref 15–46)
IRON SERPL-MCNC: 63 UG/DL (ref 35–180)
MAGNESIUM SERPL-MCNC: 2 MG/DL (ref 1.6–2.3)
MCH RBC QN AUTO: 27.7 PG (ref 26.5–33)
MCHC RBC AUTO-ENTMCNC: 31.9 G/DL (ref 31.5–36.5)
MCV RBC AUTO: 87 FL (ref 78–100)
NT-PROBNP SERPL-MCNC: 9265 PG/ML (ref 0–125)
PLATELET # BLD AUTO: 213 10E9/L (ref 150–450)
POTASSIUM SERPL-SCNC: 2.9 MMOL/L (ref 3.4–5.3)
PROT SERPL-MCNC: 7 G/DL (ref 6.8–8.8)
RBC # BLD AUTO: 4.95 10E12/L (ref 4.4–5.9)
SODIUM SERPL-SCNC: 133 MMOL/L (ref 133–144)
T4 FREE SERPL-MCNC: 1.11 NG/DL (ref 0.76–1.46)
TIBC SERPL-MCNC: 457 UG/DL (ref 240–430)
TSH SERPL DL<=0.005 MIU/L-ACNC: 15.44 MU/L (ref 0.4–4)
WBC # BLD AUTO: 6.7 10E9/L (ref 4–11)

## 2018-10-11 PROCEDURE — 85027 COMPLETE CBC AUTOMATED: CPT | Performed by: INTERNAL MEDICINE

## 2018-10-11 PROCEDURE — 83540 ASSAY OF IRON: CPT | Performed by: INTERNAL MEDICINE

## 2018-10-11 PROCEDURE — 93010 ELECTROCARDIOGRAM REPORT: CPT | Mod: ZP | Performed by: INTERNAL MEDICINE

## 2018-10-11 PROCEDURE — 83550 IRON BINDING TEST: CPT | Performed by: INTERNAL MEDICINE

## 2018-10-11 PROCEDURE — G0463 HOSPITAL OUTPT CLINIC VISIT: HCPCS | Mod: 25,ZF

## 2018-10-11 PROCEDURE — 93284 PRGRMG EVAL IMPLANTABLE DFB: CPT | Mod: 26 | Performed by: INTERNAL MEDICINE

## 2018-10-11 PROCEDURE — 80053 COMPREHEN METABOLIC PANEL: CPT | Performed by: INTERNAL MEDICINE

## 2018-10-11 PROCEDURE — 83880 ASSAY OF NATRIURETIC PEPTIDE: CPT | Performed by: INTERNAL MEDICINE

## 2018-10-11 PROCEDURE — 83735 ASSAY OF MAGNESIUM: CPT | Performed by: INTERNAL MEDICINE

## 2018-10-11 PROCEDURE — 36415 COLL VENOUS BLD VENIPUNCTURE: CPT | Performed by: INTERNAL MEDICINE

## 2018-10-11 PROCEDURE — 99214 OFFICE O/P EST MOD 30 MIN: CPT | Mod: ZP | Performed by: INTERNAL MEDICINE

## 2018-10-11 PROCEDURE — 84439 ASSAY OF FREE THYROXINE: CPT | Performed by: INTERNAL MEDICINE

## 2018-10-11 PROCEDURE — 93284 PRGRMG EVAL IMPLANTABLE DFB: CPT | Mod: ZF

## 2018-10-11 PROCEDURE — 85610 PROTHROMBIN TIME: CPT | Performed by: INTERNAL MEDICINE

## 2018-10-11 PROCEDURE — 93005 ELECTROCARDIOGRAM TRACING: CPT | Mod: ZF

## 2018-10-11 PROCEDURE — 84443 ASSAY THYROID STIM HORMONE: CPT | Performed by: INTERNAL MEDICINE

## 2018-10-11 RX ORDER — LIDOCAINE 40 MG/G
CREAM TOPICAL
Status: CANCELLED | OUTPATIENT
Start: 2018-10-11

## 2018-10-11 ASSESSMENT — PAIN SCALES - GENERAL: PAINLEVEL: NO PAIN (0)

## 2018-10-11 NOTE — LETTER
10/11/2018      RE: Everton Larios  [address] [address]       Dear Colleague,    Thank you for the opportunity to participate in the care of your patient, Everton Larios, at the [facility]. Please see a copy of my visit note below.    Rosendo Beck M.D.  Cardiovascular Medicine    I personally saw and examined this patient, discussed care with housestaff and other consultants, reviewed current laboratories and imaging studies, and conveyed impression and diagnostic/therapeutic plan to patient.    Bhumika Vera, CNP    [address] [place] [address]    [phone] (Fax)      Gunnar Ro MD    [address] [place] [id number]    [phone]     Fredrick Wolff M.D.  [facility]    Frederick Akers M.D.  [facility]    Paddy Velarde M.D.  [facility]       Problem List  1. Atrial fibrillation  2. Congenital heart disease/ASD  3. Functional bowel vs JONATHAN  4. Cardiomyopathy with EF 16%  5. AICD  6. Hypothyroidism  7. Remote history smoking  8. Asthma  9. Hypothyroidism  10. GERD with Pierce's esophagus    11. History of remote GI bleed  12. 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  He has AICD in place but no shocks.  He has history of AF requiring warfarin, pacing and previous ablation.  He has numerous GI symptoms compatible with irritable bowel, however has been told he has JONATHAN (JONATHAN may be associated with cardiomyopathy.  There is no family history of cardiomyopathy.  He does have remote history of heavy ETOH, has been through rehabilitation, still has ETOH consumption.          Social: never , live by himself    ETOH: remote  "history of heavy ETOH resulting in DWI  Has a couple of [employer] 2-3 at a sitting    Smoking: Discontinued smoking 10 years ago    Diet: indiscreet dietary and drinking habits on occasion     REVIEW of SYMPTOMS    Constitutional: without fever, chills, night sweats.  Weight is generally stable.  HEENT: without dry eyes, dry mouth, sinusitis, corryza, visual changes  Endocrine: without polyuria, polydypsia, polyphagia, heat or cold intolerance, changing mental status  Cardiology: without chest pain, tightness, heaviness, pressure, paroxysmal dyspnea, orthopnea, palpitation, pre-syncope or syncope or device discharge (if present)  Pulmonary: without , wheezing, cough, hemoptysis remote history asthma and smoking  GI: without nausea, emesis, jaundice, pain, hematemesis, melena GERD with Barretts  : without frequency, urgency, hematuria, stones, pain, abnormal bleeding, frequency, urgency but nocturia with trouble voiding  Neurologic: without TIA, CVA, trauma, seizure  Dermatologic: without lesions, abrasion rash,   Orthopedic/Rheum: without significant joint pain, impairment, limb, polyserositis, ulceration, Raynauds  Heme: without mass, bruising, frequent infection, anemia  Psychiatric: without substance abuse, hallucination, medication, depression    Exercise tolerance: reduced  AICD: BIV 84%  Transfusion: for GI bleed          Objective  BP 99/62 (BP Location: Right arm, Patient Position: Chair, Cuff Size: Adult Regular)  Pulse 54  Ht 1.727 m (5' 8\")  Wt 72.5 kg (159 lb 12.8 oz)  SpO2 95%  BMI 24.3 kg/m2   Alert, oriented, basilar rales sternotomy scar. Paradoxical @ split, ascites, hepatomegaly.    Wt Readings from Last 5 Encounters:   10/11/18 72.5 kg (159 lb 12.8 oz)       Meds  Current Outpatient Prescriptions   Medication     amiodarone (PACERONE/CODARONE) 200 MG tablet     azelastine (ASTELIN) 0.1 % nasal spray     carvedilol (COREG) 12.5 MG tablet     DULoxetine (CYMBALTA) 30 MG EC capsule "     furosemide (LASIX) 80 MG tablet     levothyroxine (SYNTHROID/LEVOTHROID) 50 MCG tablet     mesalamine (ASACOL HD) 800 MG EC tablet     montelukast (SINGULAIR) 10 MG tablet     omeprazole (PRILOSEC) 40 MG capsule     ondansetron (ZOFRAN-ODT) 4 MG ODT tab     polyethylene glycol (MIRALAX) Packet     potassium chloride SA (K-DUR/KLOR-CON M) 20 MEQ CR tablet     spironolactone (ALDACTONE) 25 MG tablet     tamsulosin (FLOMAX) 0.4 MG capsule     warfarin (COUMADIN) 5 MG tablet     albuterol (PROAIR HFA/PROVENTIL HFA/VENTOLIN HFA) 108 (90 Base) MCG/ACT inhaler     dicyclomine (BENTYL) 10 MG capsule     Simethicone 125 MG CAPS     No current facility-administered medications for this visit.          Labs    Imaging   [facility]  Component Name Value Ref Range   LV volume diastolic 240 62 - 150 cm3   LV volume systolic 195 21 - 61 cm3   IVSd 0.826 0.6 - 1.0 cm   LVIDd 5.7 4.2 - 5.8 cm   LVIDs 5.17 2.5 - 4.0 cm   LVOT diam 2.2 cm   LVOT mean gradient 0 mmHg    LVOT peak VTI 7.56 cm   LVOT mean sandro 26 cm/s   LVOT peak sandro 39.8 cm/s   LVOT peak gradient 1 mmHg    LV PWd 1.09 0.6 - 1.0 cm   AO root 3.3 cm   LA size 5.7 cm   LA/AO root ratio 1.73 no units    MV decel time 243 ms   MV peak E sandro 117 cm/s   TR peak sandro 291 cm/s   BSA 1.87 m2   Hieght 64 in    Weight 2736 lbs    IVS/PW ratio 0.8     TR peak gradent 33.9 mmHg    LV FS 9.3 28 - 44 %   Echo LVEF calculated 19 55 - 75 %   LV mass 214.1 g   LVOT area 3.80 cm2   LVOT SV 28.7 cm3   LV systolic volume index 104.3 11 - 31 cm3/m2   LV diastolic volume index 128.3 34 - 74 cm3/m2   LV mass index 114.5 g/m2   LV SVi 15.4 ml/m2   TAPSE 1.3 cm   Height 64.0 in    Weight 171 lbs    Result Narrative     Left ventricle ejection fraction is severely decreased. The calculated   left ventricular ejection fraction is 19%.    Mitral Valve: Normal mitral valve structure. Mild to moderate mitral   regurgitation.    Left Atrium: Left atrial volume is severely increased.     Estimated central venous pressure equal to 13 mmHg.    Mild pulmonary hypertension present. The estimated systolic pulmonary   artery pressure is 45 mmhg.    When compared to the previous study dated 5/8/2017, no significant   change.     Component Name Value Ref Range   VENTRICULAR RATE 60 BPM    ATRIAL RATE 60 BPM    QRS DURATION 174 ms   Q-T INTERVAL 550 ms   QTC CALCULATION (BEZET) 550 ms   R AXIS -37 degrees    T AXIS -20 degrees    MUSE DIAGNOSIS AV dual-paced rhythm  AP;   Abnormal ECG  When compared with ECG of 01-FEB-2018 14:45,  Electronic ventricular pacemaker has replaced Atrial fibrillation  APVP replaces AF with  patterm  Confirmed by JOSE GUADALUPE MENDOZA MD LOC:[facility] ([id number]) on 4/2/2018 2:11:47 PM         CLINIC Device Check2/16/2018  [place]  Component Name Value Ref Range   Comments/Summary Type: In clinic 1 week post op CRT-D generator and wound check.   Presenting: AS BiVP, frequent PVC, 65bpm  Lead/Battery Status: Stable  Atrial Arrhythmias: None detected  Vent Arrhythmias: None detected.   Anticoagulant: Warfarin.   Comments: Normal device function. BiV pacing 84%, likely related to PVCs. Pt reports mild shortness of breath with walking since his new device. Reviewed Accelerometer programing on last device vs current device, therefore reprogrammed activity threshold   from medium to medium low and response factor from 8 to 9 to match previous settings.   BIV [employer]    Device G141  BIV   Result Narrative     Type: In clinic 1 week post op CRT-D generator and wound check.   Presenting: AS BiVP, frequent PVC, 65bpm  Lead/Battery Status: Stable  Atrial Arrhythmias: None detected  Vent Arrhythmias: None detected.   Anticoagulant: Warfarin.   Comments: Normal device function. BiV pacing 84%, likely related to PVCs.   Pt reports mild shortness of breath with walking since his new device.   Reviewed Accelerometer programing on last device vs current device,   therefore  reprogrammed activity threshold   from medium to medium low and response factor from      CONCLUSION:  1.  Multichamber cardiac enlargement with worsening cardiac function with new, small bilateral pleural effusions, right greater than left, hepatomegaly, new, small amount of ascites in the abdomen and pelvis and worsening subcutaneous edema.  2.  Poor cardiac function results in essentially a CTA of the abdomen and pelvis. All arterial vessels are well opacified and unremarkable. Incidental 2 right-sided renal arteries  3.  Small and large bowel loops are unremarkable. No abnormalities to explain abdominal pain.    NOTE: ABNORMAL REPORT        Result Narrative   CT ABDOMEN PELVIS WO ORAL W IV CONTRAST  8/6/2018 2:59 PM        INDICATION: Abdominal pain    TECHNIQUE: CT abdomen and pelvis. Multiplanar reformation images (MPR). Dose reduction techniques were used.   IV CONTRAST: Iohexol (Omni) 100 mL  COMPARISON: CT enterography 01/12/2018 older studies.    FINDINGS: Due to the cardiac dysfunction, standard timing of the study results in essentially an arterial phase examination of the abdomen and pelvis.    LUNG BASES: Poststernotomy changes with leads. Multichamber cardiac enlargement. There are small, layering bilateral pleural effusions.    ABDOMEN: Reflux of contrast into the intrahepatic veins. Liver is enlarged with the right lobe again measuring 20 cm. New trace ascites adjacent to the liver and diminutive spleen. Pancreas, gallbladder, adrenal glands and both kidneys are unremarkable.   Presumed tiny left renal cyst. Aorta is normal caliber. Vessels are well opacified. There are 2 right renal arteries. No adenopathy. Small bowel loops are normal.    PELVIS: New small amount of ascites in the pelvis. Colon is unremarkable. Appendix not seen. Prostate is not enlarged.    MUSCULOSKELETAL: No suspicious bone lesions. Increased stranding of the subcutaneous fat dependent edema.     Results for ANA ROSA BALDWIN  LUIS (MRN [id number]) as of 10/11/2018 14:29   Ref. Range 10/11/2018 12:35   Sodium Latest Ref Range: 133 - 144 mmol/L 133   Potassium Latest Ref Range: 3.4 - 5.3 mmol/L 2.9 (L)   Chloride Latest Ref Range: 94 - 109 mmol/L 92 (L)   Carbon Dioxide Latest Ref Range: 20 - 32 mmol/L 35 (H)   Urea Nitrogen Latest Ref Range: 7 - 30 mg/dL 12   Creatinine Latest Ref Range: 0.66 - 1.25 mg/dL 1.10   GFR Estimate Latest Ref Range: >60 mL/min/1.7m2 69   GFR Estimate If Black Latest Ref Range: >60 mL/min/1.7m2 84   Calcium Latest Ref Range: 8.5 - 10.1 mg/dL 8.6   Anion Gap Latest Ref Range: 3 - 14 mmol/L 6   Albumin Latest Ref Range: 3.4 - 5.0 g/dL 3.2 (L)   Protein Total Latest Ref Range: 6.8 - 8.8 g/dL 7.0   Bilirubin Total Latest Ref Range: 0.2 - 1.3 mg/dL 2.1 (H)   Alkaline Phosphatase Latest Ref Range: 40 - 150 U/L 152 (H)   ALT Latest Ref Range: 0 - 70 U/L 27   AST Latest Ref Range: 0 - 45 U/L 27   N-Terminal Pro Bnp Latest Ref Range: 0 - 125 pg/mL 9265 (H)   T4 Free Latest Ref Range: 0.76 - 1.46 ng/dL 1.11   TSH Latest Ref Range: 0.40 - 4.00 mU/L 15.44 (H)   Glucose Latest Ref Range: 70 - 99 mg/dL 107 (H)   WBC Latest Ref Range: 4.0 - 11.0 10e9/L 6.7   Hemoglobin Latest Ref Range: 13.3 - 17.7 g/dL 13.7   Hematocrit Latest Ref Range: 40.0 - 53.0 % 42.9   Platelet Count Latest Ref Range: 150 - 450 10e9/L 213   RBC Count Latest Ref Range: 4.4 - 5.9 10e12/L 4.95   MCV Latest Ref Range: 78 - 100 fl 87   MCH Latest Ref Range: 26.5 - 33.0 pg 27.7   MCHC Latest Ref Range: 31.5 - 36.5 g/dL 31.9   RDW Latest Ref Range: 10.0 - 15.0 % 18.6 (H)   INR Latest Ref Range: 0.86 - 1.14  2.75 (H)     Assessment/Plan     The patient has significant structural and hemodynamic alterations, of uncertain etiology but includes: history of ASD closure 1968, history of apparent heavy ETOH in past, history of inflammatory bowel.  EF 16%, functional BIV pacing present 84% of time.    He has chronic GI problems that have been termed JONATHAN in past but  also appears to have irritable bowel with frequent constipation and bloating.    Patient has recently discontinued lisinopril secondary to low blood pressure.  Patient looks better than his chart does, is still employed. He has a large number of GI complaints that may or may not be related to potential elevation of the cardiac pressures.    I would suggest:  1. Cardiopulmonary stress test with determination of oxygen consumption to objectify level of disability  2. Absolute abstinence from ETOH as is myocardial depressant   3. Right heart catherization  4. Discuss pacemaker settings with Dr. Stern's office  5. Review GI history and potential of Linzess to be helpful (once we have established and optimized intra-cardiac pressures    6. Warfarin will need to be held for 48 hours prior to right heart catherization    7?. Contraction alkalosis and hypokalemia: check magnesium with today's blood.  He is currently taking 1 potassium table/day    8. Tamsulosin may cause fluid retentiobn.    9. RHC and CPST Friday next week When right heart catherization is done, please also place RA pacing lead and record intracardiac electrogram from right atrium and pace atrium at 80 to 90 BPM and see if captures and conduction    10 Device programmed to VVIR 89BPM    11. Add iron studies, free T4, and magnesium to laboratories.    CC: physicians above    We paced the atrium at various speeds AAI up to 100 beat per minute and there was no consistent capture of atrium  and conduction This may be secondary to lead or cardiac dysfunction.  When paced BIV there appeared to be some suppression of frequency of ventricular ectopy.  VVI pacing at 90 from either isolated  left or right ventricle accentuated ventricular ectopy. Patient left with BIV pacing at 80BPM    Potassium augmentation instructions given.         Time: reviewing outside charts 60 minutes, pacing interrogation and resetting 20 minutes, discussion with [employer] EP 20  beltran,      Rosendo Beck MD

## 2018-10-11 NOTE — MR AVS SNAPSHOT
After Visit Summary   10/11/2018    Everton Larios    MRN: [id number]           Patient Information     Date Of Birth          1965        Visit Information        Provider Department      10/11/2018 1:00 PM Rosendo Beck MD [facility]        Today's Diagnoses     Chronic systolic heart failure (H)    -  1    Anemia        Other ill-defined heart diseases          Care Instructions    Please call Duong with available dates for ordered heart tests, the right heart cath and the cardiopulmonary stress test. Of note the 19th is not available for the heart cath.    Thank you for your visit today.  Please call me with any questions or concerns.   Duong Rodrigues RN  Cardiology Care Coordinator  [phone], press option 1 then option 3    Thank you for coming to see us    Your potassium is low and needs to be corrected.  Please take three potassium tablets today and two potassium tablet tomorrow and Saturday before going back to 1 tablet per day.  You should see Dr. Wolff or at least have your magnesium, potassium and kidney function checked on Monday           Follow-ups after your visit        Additional Services     Follow-Up with Cardiologist       Please schedule a cardiopulmonary stress test on Friday 10/19/18. He is hopefully having a right heart cath in the morning, so maybe a later time in the day for the cardiopulmonary stress test.                  Your next 10 appointments already scheduled     Oct 11, 2018  3:30 PM CDT   (Arrive by 3:15 PM)   Implanted Defibulator with UC CV DEVICE 2   [facility] ([facility])    [address] [place] [address]  [address] [place] [id number] [phone]              Future tests that were ordered for you today     Open Future Orders        Priority Expected Expires Ordered    Follow-Up with Cardiologist Routine 10/11/2018 1/9/2019 10/11/2018    Heart Cath Right heart cath Routine  10/11/2019 10/11/2018  "   Card Cardiopulmonary stress test - adult Routine 10/19/2018 2018 10/11/2018            Who to contact     If you have questions or need follow up information about today's clinic visit or your schedule please contact [facility] directly at [phone].  Normal or non-critical lab and imaging results will be communicated to you by MyChart, letter or phone within 4 business days after the clinic has received the results. If you do not hear from us within 7 days, please contact the clinic through [employer] or phone. If you have a critical or abnormal lab result, we will notify you by phone as soon as possible.  Submit refill requests through [employer] or call your pharmacy and they will forward the refill request to us. Please allow 3 business days for your refill to be completed.          Additional Information About Your Visit        [employer] Information     [employer] lets you send messages to your doctor, view your test results, renew your prescriptions, schedule appointments and more. To sign up, go to [email or web address] . Click on \"Log in\" on the left side of the screen, which will take you to the Welcome page. Then click on \"Sign up Now\" on the right side of the page.     You will be asked to enter the access code listed below, as well as some personal information. Please follow the directions to create your username and password.     Your access code is: [email or web address]  Expires: 2018  3:27 PM     Your access code will  in 90 days. If you need help or a new code, please call your [place] clinic or [phone].        Care EveryWhere ID     This is your Care EveryWhere ID. This could be used by other organizations to access your [place] medical records  [id number]        Your Vitals Were     Pulse Height Pulse Oximetry BMI (Body Mass Index)          54 1.727 m (5' 8\") 95% 24.3 kg/m2         Blood Pressure from Last 3 Encounters:   10/11/18 99/62    Weight from Last 3 Encounters: "   10/11/18 72.5 kg (159 lb 12.8 oz)              We Performed the Following     EKG 12-lead, tracing only (Same Day)     EKG 12-lead, tracing only (Same Day)        Primary Care Provider Office Phone # Fax #    Fredrick Wolff [phone]       [address] [address]        Equal Access to Services     BELIA GOLDBERG : Hadii aad ku hadasho Soomaali, waaxda luqadaha, qaybta kaalmada adeegyada, waxay idiin hayaan adeeg kharash la'aan . So [facility] [phone].    ATENCIÓN: Si habla espeloisa, tiene a anaya disposición servicios gratuitos de asistencia lingüística. Jad al [phone].    We comply with applicable federal civil rights laws and [place] laws. We do not discriminate on the basis of race, color, national origin, age, disability, sex, sexual orientation, or gender identity.            Thank you!     Thank you for choosing [facility]  for your care. Our goal is always to provide you with excellent care. Hearing back from our patients is one way we can continue to improve our services. Please take a few minutes to complete the written survey that you may receive in the mail after your visit with us. Thank you!             Your Updated Medication List - Protect others around you: Learn how to safely use, store and throw away your medicines at www.disposemymeds.org.          This list is accurate as of 10/11/18  3:26 PM.  Always use your most recent med list.                   Brand Name Dispense Instructions for use Diagnosis    albuterol 108 (90 Base) MCG/ACT inhaler    PROAIR HFA/PROVENTIL HFA/VENTOLIN HFA     Inhale 2 puffs into the lungs every 6 hours as needed for shortness of breath / dyspnea or wheezing    Intermittent asthma without complication, unspecified asthma severity       amiodarone 200 MG tablet    PACERONE/CODARONE     Take 1 tablet (200 mg) by mouth daily    Paroxysmal atrial fibrillation (H)       ASACOL  MG EC tablet   Generic drug:   mesalamine      Take 3 tablets (2,400 mg) by mouth 2 times daily    Ulcerative colitis with complication, unspecified location (H)       azelastine 0.1 % nasal spray    ASTELIN     Spray 2 sprays into both nostrils 2 times daily    Chronic rhinitis       BENTYL 10 MG capsule   Generic drug:  dicyclomine      Take 1 capsule (10 mg) by mouth 3 times daily as needed (ulcerative colitis)    Ulcerative colitis with complication, unspecified location (H)       carvedilol 12.5 MG tablet    COREG     Take 1 tablet (12.5 mg) by mouth 2 times daily (with meals)    Chronic systolic heart failure (H)       CYMBALTA 30 MG EC capsule   Generic drug:  DULoxetine      Take 1 capsule (30 mg) by mouth daily    Depression, unspecified depression type       FLOMAX 0.4 MG capsule   Generic drug:  tamsulosin      Take 1 capsule (0.4 mg) by mouth daily (with dinner)    Benign prostatic hyperplasia, unspecified whether lower urinary tract symptoms present       furosemide 80 MG tablet    LASIX     Take one tab (80 mg) in am and 1/2 tab (40 mg) in pm    Chronic systolic heart failure (H)       levothyroxine 50 MCG tablet    SYNTHROID/LEVOTHROID     Take 1 tablet (50 mcg) by mouth daily    Hypothyroidism due to medication       MIRALAX Packet   Generic drug:  polyethylene glycol      Take 17 g by mouth 4 times daily    Constipation, unspecified constipation type       omeprazole 40 MG capsule    priLOSEC     Take 1 capsule (40 mg) by mouth daily In pm    Nausea       ondansetron 4 MG ODT tab    ZOFRAN-ODT     Take 1 tablet (4 mg) by mouth every 8 hours as needed for nausea    Nausea       potassium chloride SA 20 MEQ CR tablet    K-DUR/KLOR-CON M     Take 1 tablet (20 mEq) by mouth daily    Hypokalemia       Simethicone 125 MG Caps      Take 1 capsule by mouth 4 times daily    Nausea       SINGULAIR 10 MG tablet   Generic drug:  montelukast      Take 1 tablet (10 mg) by mouth At Bedtime    Intermittent asthma without complication,  unspecified asthma severity       spironolactone 25 MG tablet    ALDACTONE     Take 1 tablet (25 mg) by mouth daily    Chronic systolic heart failure (H)       warfarin 5 MG tablet    COUMADIN     Take 2.5 mg 2 dys/wk and 5 mg all others    Paroxysmal atrial fibrillation (H)

## 2018-10-11 NOTE — PATIENT INSTRUCTIONS
Please call Duong with available dates for ordered heart tests, the right heart cath and the cardiopulmonary stress test. Of note the 19th is not available for the heart cath.    Thank you for your visit today.  Please call me with any questions or concerns.   Duong Rodrigues RN  Cardiology Care Coordinator  [phone], press option 1 then option 3    Thank you for coming to see us    Your potassium is low and needs to be corrected.  Please take three potassium tablets today and two potassium tablet tomorrow and Saturday before going back to 1 tablet per day.  You should see Dr. Wolff or at least have your magnesium, potassium and kidney function checked on Monday

## 2018-10-11 NOTE — MR AVS SNAPSHOT
"              After Visit Summary   10/11/2018    Everton Larios    MRN: [id number]           Patient Information     Date Of Birth          1965        Visit Information        Provider Department      10/11/2018 3:30 PM UC CV DEVICE 2 [facility]        Today's Diagnoses     Nonischemic cardiomyopathy (H)    -  1      Care Instructions    It was a pleasure to see you in clinic today.  Please do not hesitate to call with any questions or concerns.  We look forward to seeing you in clinic at your next device check in 2 weeks.    Corinne Alberto, RN, MS, CCRN  Electrophysiology Nurse Clinician  [facility]    During Business Hours Please Call:  [phone]  After Hours Please Call:  [phone] - select option #4 and ask for job code [phone]                  Follow-ups after your visit        Who to contact     If you have questions or need follow up information about today's clinic visit or your schedule please contact [facility] directly at [phone].  Normal or non-critical lab and imaging results will be communicated to you by Plink[employer], letter or phone within 4 business days after the clinic has received the results. If you do not hear from us within 7 days, please contact the clinic through [employer] or phone. If you have a critical or abnormal lab result, we will notify you by phone as soon as possible.  Submit refill requests through [employer] or call your pharmacy and they will forward the refill request to us. Please allow 3 business days for your refill to be completed.          Additional Information About Your Visit        [employer] Information     [employer] lets you send messages to your doctor, view your test results, renew your prescriptions, schedule appointments and more. To sign up, go to [email or web address] . Click on \"Log in\" on the left side of the screen, which will take you to the Welcome page. Then click on \"Sign up Now\" on the right side of the " page.     You will be asked to enter the access code listed below, as well as some personal information. Please follow the directions to create your username and password.     Your access code is: [email or web address]  Expires: 2018  3:27 PM     Your access code will  in 90 days. If you need help or a new code, please call your [place] clinic or [phone].        Care EveryWhere ID     This is your Care EveryWhere ID. This could be used by other organizations to access your [place] medical records  [id number]         Blood Pressure from Last 3 Encounters:   10/11/18 99/62    Weight from Last 3 Encounters:   10/11/18 72.5 kg (159 lb 12.8 oz)              We Performed the Following     EKG CARDIAC - HIM SCAN     ICD DEVICE PROGRAMMING EVAL, MULTI LEAD ICD        Primary Care Provider Office Phone # Fax #    Fredrick J New [phone]       [address] [address]        Equal Access to Services     BELIA GOLDBERG : Hadii aad ku hadasho Soomaali, waaxda luqadaha, qaybta kaalmada adeegyada, waxay idiin hayaan adeeg addisonararachel la'sara . So [facility] [phone].    ATENCIÓN: Si habla español, tiene a anaya disposición servicios gratuitos de asistencia lingüística. [facility] [phone].    We comply with applicable federal civil rights laws and [place] laws. We do not discriminate on the basis of race, color, national origin, age, disability, sex, sexual orientation, or gender identity.            Thank you!     Thank you for choosing [facility]  for your care. Our goal is always to provide you with excellent care. Hearing back from our patients is one way we can continue to improve our services. Please take a few minutes to complete the written survey that you may receive in the mail after your visit with us. Thank you!             Your Updated Medication List - Protect others around you: Learn how to safely use, store and throw away your medicines at  www.disposemymeds.org.          This list is accurate as of 10/11/18 11:59 PM.  Always use your most recent med list.                   Brand Name Dispense Instructions for use Diagnosis    albuterol 108 (90 Base) MCG/ACT inhaler    PROAIR HFA/PROVENTIL HFA/VENTOLIN HFA     Inhale 2 puffs into the lungs every 6 hours as needed for shortness of breath / dyspnea or wheezing    Intermittent asthma without complication, unspecified asthma severity       amiodarone 200 MG tablet    PACERONE/CODARONE     Take 1 tablet (200 mg) by mouth daily    Paroxysmal atrial fibrillation (H)       ASACOL  MG EC tablet   Generic drug:  mesalamine      Take 3 tablets (2,400 mg) by mouth 2 times daily    Ulcerative colitis with complication, unspecified location (H)       azelastine 0.1 % nasal spray    ASTELIN     Spray 2 sprays into both nostrils 2 times daily    Chronic rhinitis       BENTYL 10 MG capsule   Generic drug:  dicyclomine      Take 1 capsule (10 mg) by mouth 3 times daily as needed (ulcerative colitis)    Ulcerative colitis with complication, unspecified location (H)       carvedilol 12.5 MG tablet    COREG     Take 1 tablet (12.5 mg) by mouth 2 times daily (with meals)    Chronic systolic heart failure (H)       CYMBALTA 30 MG EC capsule   Generic drug:  DULoxetine      Take 1 capsule (30 mg) by mouth daily    Depression, unspecified depression type       FLOMAX 0.4 MG capsule   Generic drug:  tamsulosin      Take 1 capsule (0.4 mg) by mouth daily (with dinner)    Benign prostatic hyperplasia, unspecified whether lower urinary tract symptoms present       furosemide 80 MG tablet    LASIX     Take one tab (80 mg) in am and 1/2 tab (40 mg) in pm    Chronic systolic heart failure (H)       levothyroxine 50 MCG tablet    SYNTHROID/LEVOTHROID     Take 1 tablet (50 mcg) by mouth daily    Hypothyroidism due to medication       MIRALAX Packet   Generic drug:  polyethylene glycol      Take 17 g by mouth 4 times daily     Constipation, unspecified constipation type       omeprazole 40 MG capsule    priLOSEC     Take 1 capsule (40 mg) by mouth daily In pm    Nausea       ondansetron 4 MG ODT tab    ZOFRAN-ODT     Take 1 tablet (4 mg) by mouth every 8 hours as needed for nausea    Nausea       potassium chloride SA 20 MEQ CR tablet    K-DUR/KLOR-CON M     Take 1 tablet (20 mEq) by mouth daily    Hypokalemia       Simethicone 125 MG Caps      Take 1 capsule by mouth 4 times daily    Nausea       SINGULAIR 10 MG tablet   Generic drug:  montelukast      Take 1 tablet (10 mg) by mouth At Bedtime    Intermittent asthma without complication, unspecified asthma severity       spironolactone 25 MG tablet    ALDACTONE     Take 1 tablet (25 mg) by mouth daily    Chronic systolic heart failure (H)       warfarin 5 MG tablet    COUMADIN     Take 2.5 mg 2 dys/wk and 5 mg all others    Paroxysmal atrial fibrillation (H)

## 2018-10-11 NOTE — NURSING NOTE
Cardiac Testing: Patient given instructions regarding cardiopulmonary stress test. Discussed purpose, preparation, procedure and when to expect results reported back to the patient. Patient demonstrated understanding of this information and agreed to call with further questions or concerns.  Labs:see ordered tests. . Patient demonstrated understanding of this information and agreed to call with further questions or concerns.   Med Reconcile: Reviewed and verified all current medications with the patient. The updated medication list was printed and given to the patient.  Right Heart Catheterization: Patient was instructed regarding right heart catheterization, including discussion of the procedure, preparation, intra-procedural steps, and recovery at home. Patient demonstrated understanding of this information and agreed to call with further questions or concerns.  Patient stated he understood all health information given and agreed to call with further questions or concerns.  Return Appointment: To be determined.Patient given instructions regarding scheduling next clinic visit. Patient demonstrated understanding of this information and agreed to call with further questions or concerns.

## 2018-10-11 NOTE — PROGRESS NOTES
Rosendo Beck M.D.  Cardiovascular Medicine    I personally saw and examined this patient, discussed care with housestaff and other consultants, reviewed current laboratories and imaging studies, and conveyed impression and diagnostic/therapeutic plan to patient.    Bhumika Vera, CNP    [address] [place] [address]    [phone] (Fax)      Gunnar Ro MD    [address] [place] [id number]    [phone]     Fredrick Wolff M.D.  [facility]    Frederick Akers M.D.  [facility]    Paddy Velarde M.D.  [facility]       Problem List  1. Atrial fibrillation  2. Congenital heart disease/ASD  3. Functional bowel vs JONATHAN  4. Cardiomyopathy with EF 16%  5. AICD  6. Hypothyroidism  7. Remote history smoking  8. Asthma  9. Hypothyroidism  10. GERD with Pierce's esophagus    11. History of remote GI bleed  12. 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  He has AICD in place but no shocks.  He has history of AF requiring warfarin, pacing and previous ablation.  He has numerous GI symptoms compatible with irritable bowel, however has been told he has JONATHAN (JONATHAN may be associated with cardiomyopathy.  There is no family history of cardiomyopathy.  He does have remote history of heavy ETOH, has been through rehabilitation, still has ETOH consumption.          Social: never , live by himself    ETOH: remote history of heavy ETOH resulting in DWI  Has a couple of [employer] 2-3 at a sitting    Smoking: Discontinued smoking 10 years ago    Diet: indiscreet dietary and drinking habits on occasion     REVIEW of SYMPTOMS    Constitutional: without fever, chills, night sweats.  Weight is generally stable.  HEENT: without  "dry eyes, dry mouth, sinusitis, corryza, visual changes  Endocrine: without polyuria, polydypsia, polyphagia, heat or cold intolerance, changing mental status  Cardiology: without chest pain, tightness, heaviness, pressure, paroxysmal dyspnea, orthopnea, palpitation, pre-syncope or syncope or device discharge (if present)  Pulmonary: without , wheezing, cough, hemoptysis remote history asthma and smoking  GI: without nausea, emesis, jaundice, pain, hematemesis, melena GERD with Barretts  : without frequency, urgency, hematuria, stones, pain, abnormal bleeding, frequency, urgency but nocturia with trouble voiding  Neurologic: without TIA, CVA, trauma, seizure  Dermatologic: without lesions, abrasion rash,   Orthopedic/Rheum: without significant joint pain, impairment, limb, polyserositis, ulceration, Raynauds  Heme: without mass, bruising, frequent infection, anemia  Psychiatric: without substance abuse, hallucination, medication, depression    Exercise tolerance: reduced  AICD: BIV 84%  Transfusion: for GI bleed          Objective  BP 99/62 (BP Location: Right arm, Patient Position: Chair, Cuff Size: Adult Regular)  Pulse 54  Ht 1.727 m (5' 8\")  Wt 72.5 kg (159 lb 12.8 oz)  SpO2 95%  BMI 24.3 kg/m2   Alert, oriented, basilar rales sternotomy scar. Paradoxical @ split, ascites, hepatomegaly.    Wt Readings from Last 5 Encounters:   10/11/18 72.5 kg (159 lb 12.8 oz)       Meds  Current Outpatient Prescriptions   Medication     amiodarone (PACERONE/CODARONE) 200 MG tablet     azelastine (ASTELIN) 0.1 % nasal spray     carvedilol (COREG) 12.5 MG tablet     DULoxetine (CYMBALTA) 30 MG EC capsule     furosemide (LASIX) 80 MG tablet     levothyroxine (SYNTHROID/LEVOTHROID) 50 MCG tablet     mesalamine (ASACOL HD) 800 MG EC tablet     montelukast (SINGULAIR) 10 MG tablet     omeprazole (PRILOSEC) 40 MG capsule     ondansetron (ZOFRAN-ODT) 4 MG ODT tab     polyethylene glycol (MIRALAX) Packet     potassium chloride SA " (K-DUR/KLOR-CON M) 20 MEQ CR tablet     spironolactone (ALDACTONE) 25 MG tablet     tamsulosin (FLOMAX) 0.4 MG capsule     warfarin (COUMADIN) 5 MG tablet     albuterol (PROAIR HFA/PROVENTIL HFA/VENTOLIN HFA) 108 (90 Base) MCG/ACT inhaler     dicyclomine (BENTYL) 10 MG capsule     Simethicone 125 MG CAPS     No current facility-administered medications for this visit.          Labs    Imaging   [facility]  Component Name Value Ref Range   LV volume diastolic 240 62 - 150 cm3   LV volume systolic 195 21 - 61 cm3   IVSd 0.826 0.6 - 1.0 cm   LVIDd 5.7 4.2 - 5.8 cm   LVIDs 5.17 2.5 - 4.0 cm   LVOT diam 2.2 cm   LVOT mean gradient 0 mmHg    LVOT peak VTI 7.56 cm   LVOT mean sandro 26 cm/s   LVOT peak sandro 39.8 cm/s   LVOT peak gradient 1 mmHg    LV PWd 1.09 0.6 - 1.0 cm   AO root 3.3 cm   LA size 5.7 cm   LA/AO root ratio 1.73 no units    MV decel time 243 ms   MV peak E sandro 117 cm/s   TR peak sandro 291 cm/s   BSA 1.87 m2   Hieght 64 in    Weight 2736 lbs    IVS/PW ratio 0.8     TR peak gradent 33.9 mmHg    LV FS 9.3 28 - 44 %   Echo LVEF calculated 19 55 - 75 %   LV mass 214.1 g   LVOT area 3.80 cm2   LVOT SV 28.7 cm3   LV systolic volume index 104.3 11 - 31 cm3/m2   LV diastolic volume index 128.3 34 - 74 cm3/m2   LV mass index 114.5 g/m2   LV SVi 15.4 ml/m2   TAPSE 1.3 cm   Height 64.0 in    Weight 171 lbs    Result Narrative     Left ventricle ejection fraction is severely decreased. The calculated   left ventricular ejection fraction is 19%.    Mitral Valve: Normal mitral valve structure. Mild to moderate mitral   regurgitation.    Left Atrium: Left atrial volume is severely increased.    Estimated central venous pressure equal to 13 mmHg.    Mild pulmonary hypertension present. The estimated systolic pulmonary   artery pressure is 45 mmhg.    When compared to the previous study dated 5/8/2017, no significant   change.     Component Name Value Ref Range   VENTRICULAR RATE 60 BPM    ATRIAL RATE 60 BPM    QRS  DURATION 174 ms   Q-T INTERVAL 550 ms   QTC CALCULATION (BEZET) 550 ms   R AXIS -37 degrees    T AXIS -20 degrees    MUSE DIAGNOSIS AV dual-paced rhythm  AP;   Abnormal ECG  When compared with ECG of 01-FEB-2018 14:45,  Electronic ventricular pacemaker has replaced Atrial fibrillation  APVP replaces AF with  patterm  Confirmed by JOSE GUADALUPE MENDOZA MD LOC:[facility] ([id number]) on 4/2/2018 2:11:47 PM         CLINIC Device Check2/16/2018  [facility]  Component Name Value Ref Range   Comments/Summary Type: In clinic 1 week post op CRT-D generator and wound check.   Presenting: AS BiVP, frequent PVC, 65bpm  Lead/Battery Status: Stable  Atrial Arrhythmias: None detected  Vent Arrhythmias: None detected.   Anticoagulant: Warfarin.   Comments: Normal device function. BiV pacing 84%, likely related to PVCs. Pt reports mild shortness of breath with walking since his new device. Reviewed Accelerometer programing on last device vs current device, therefore reprogrammed activity threshold   from medium to medium low and response factor from 8 to 9 to match previous settings.   BIV [facility]    Device G141  BIV   Result Narrative     Type: In clinic 1 week post op CRT-D generator and wound check.   Presenting: AS BiVP, frequent PVC, 65bpm  Lead/Battery Status: Stable  Atrial Arrhythmias: None detected  Vent Arrhythmias: None detected.   Anticoagulant: Warfarin.   Comments: Normal device function. BiV pacing 84%, likely related to PVCs.   Pt reports mild shortness of breath with walking since his new device.   Reviewed Accelerometer programing on last device vs current device,   therefore reprogrammed activity threshold   from medium to medium low and response factor from      CONCLUSION:  1.  Multichamber cardiac enlargement with worsening cardiac function with new, small bilateral pleural effusions, right greater than left, hepatomegaly, new, small amount of ascites in the abdomen and pelvis and worsening  subcutaneous edema.  2.  Poor cardiac function results in essentially a CTA of the abdomen and pelvis. All arterial vessels are well opacified and unremarkable. Incidental 2 right-sided renal arteries  3.  Small and large bowel loops are unremarkable. No abnormalities to explain abdominal pain.    NOTE: ABNORMAL REPORT        Result Narrative   CT ABDOMEN PELVIS WO ORAL W IV CONTRAST  8/6/2018 2:59 PM        INDICATION: Abdominal pain    TECHNIQUE: CT abdomen and pelvis. Multiplanar reformation images (MPR). Dose reduction techniques were used.   IV CONTRAST: Iohexol (Omni) 100 mL  COMPARISON: CT enterography 01/12/2018 older studies.    FINDINGS: Due to the cardiac dysfunction, standard timing of the study results in essentially an arterial phase examination of the abdomen and pelvis.    LUNG BASES: Poststernotomy changes with leads. Multichamber cardiac enlargement. There are small, layering bilateral pleural effusions.    ABDOMEN: Reflux of contrast into the intrahepatic veins. Liver is enlarged with the right lobe again measuring 20 cm. New trace ascites adjacent to the liver and diminutive spleen. Pancreas, gallbladder, adrenal glands and both kidneys are unremarkable.   Presumed tiny left renal cyst. Aorta is normal caliber. Vessels are well opacified. There are 2 right renal arteries. No adenopathy. Small bowel loops are normal.    PELVIS: New small amount of ascites in the pelvis. Colon is unremarkable. Appendix not seen. Prostate is not enlarged.    MUSCULOSKELETAL: No suspicious bone lesions. Increased stranding of the subcutaneous fat dependent edema.     Results for ANA ROSA BALDWIN (MRN [id number]) as of 10/11/2018 14:29   Ref. Range 10/11/2018 12:35   Sodium Latest Ref Range: 133 - 144 mmol/L 133   Potassium Latest Ref Range: 3.4 - 5.3 mmol/L 2.9 (L)   Chloride Latest Ref Range: 94 - 109 mmol/L 92 (L)   Carbon Dioxide Latest Ref Range: 20 - 32 mmol/L 35 (H)   Urea Nitrogen Latest Ref Range: 7 - 30  mg/dL 12   Creatinine Latest Ref Range: 0.66 - 1.25 mg/dL 1.10   GFR Estimate Latest Ref Range: >60 mL/min/1.7m2 69   GFR Estimate If Black Latest Ref Range: >60 mL/min/1.7m2 84   Calcium Latest Ref Range: 8.5 - 10.1 mg/dL 8.6   Anion Gap Latest Ref Range: 3 - 14 mmol/L 6   Albumin Latest Ref Range: 3.4 - 5.0 g/dL 3.2 (L)   Protein Total Latest Ref Range: 6.8 - 8.8 g/dL 7.0   Bilirubin Total Latest Ref Range: 0.2 - 1.3 mg/dL 2.1 (H)   Alkaline Phosphatase Latest Ref Range: 40 - 150 U/L 152 (H)   ALT Latest Ref Range: 0 - 70 U/L 27   AST Latest Ref Range: 0 - 45 U/L 27   N-Terminal Pro Bnp Latest Ref Range: 0 - 125 pg/mL 9265 (H)   T4 Free Latest Ref Range: 0.76 - 1.46 ng/dL 1.11   TSH Latest Ref Range: 0.40 - 4.00 mU/L 15.44 (H)   Glucose Latest Ref Range: 70 - 99 mg/dL 107 (H)   WBC Latest Ref Range: 4.0 - 11.0 10e9/L 6.7   Hemoglobin Latest Ref Range: 13.3 - 17.7 g/dL 13.7   Hematocrit Latest Ref Range: 40.0 - 53.0 % 42.9   Platelet Count Latest Ref Range: 150 - 450 10e9/L 213   RBC Count Latest Ref Range: 4.4 - 5.9 10e12/L 4.95   MCV Latest Ref Range: 78 - 100 fl 87   MCH Latest Ref Range: 26.5 - 33.0 pg 27.7   MCHC Latest Ref Range: 31.5 - 36.5 g/dL 31.9   RDW Latest Ref Range: 10.0 - 15.0 % 18.6 (H)   INR Latest Ref Range: 0.86 - 1.14  2.75 (H)     Assessment/Plan     The patient has significant structural and hemodynamic alterations, of uncertain etiology but includes: history of ASD closure 1968, history of apparent heavy ETOH in past, history of inflammatory bowel.  EF 16%, functional BIV pacing present 84% of time.    He has chronic GI problems that have been termed JONATHAN in past but also appears to have irritable bowel with frequent constipation and bloating.    Patient has recently discontinued lisinopril secondary to low blood pressure.  Patient looks better than his chart does, is still employed. He has a large number of GI complaints that may or may not be related to potential elevation of the cardiac  pressures.    I would suggest:  1. Cardiopulmonary stress test with determination of oxygen consumption to objectify level of disability  2. Absolute abstinence from ETOH as is myocardial depressant   3. Right heart catherization  4. Discuss pacemaker settings with Dr. Stern's office  5. Review GI history and potential of Linzess to be helpful (once we have established and optimized intra-cardiac pressures    6. Warfarin will need to be held for 48 hours prior to right heart catherization    7?. Contraction alkalosis and hypokalemia: check magnesium with today's blood.  He is currently taking 1 potassium table/day    8. Tamsulosin may cause fluid retentiobn.    9. RHC and CPST Friday next week When right heart catherization is done, please also place RA pacing lead and record intracardiac electrogram from right atrium and pace atrium at 80 to 90 BPM and see if captures and conduction    10 Device programmed to VVIR 89BPM    11. Add iron studies, free T4, and magnesium to laboratories.    CC: physicians above    We paced the atrium at various speeds AAI up to 100 beat per minute and there was no consistent capture of atrium  and conduction This may be secondary to lead or cardiac dysfunction.  When paced BIV there appeared to be some suppression of frequency of ventricular ectopy.  VVI pacing at 90 from either isolated  left or right ventricle accentuated ventricular ectopy. Patient left with BIV pacing at 80BPM    Potassium augmentation instructions given.         Time: reviewing outside charts 60 minutes, pacing interrogation and resetting 20 minutes, discussion with [facility] EP 20 minutes,

## 2018-10-11 NOTE — NURSING NOTE
Chief Complaint   Patient presents with     Follow Up For     Patient is referred from Bhumika Vera CNP, Dr. Jaycob Casas at [facility] in [place], [place]. Pt is a 52 y/o male with nonischemic cardiomyopathy, systolic heart failure with EF of 16%.     Vitals were taken and medications were reconciled.     JULISA Yao  12:53 PM

## 2018-10-14 ENCOUNTER — COMMUNICATION - HEALTHEAST (OUTPATIENT)
Dept: INTERNAL MEDICINE | Facility: CLINIC | Age: 53
End: 2018-10-14

## 2018-10-14 DIAGNOSIS — N40.0 BPH (BENIGN PROSTATIC HYPERPLASIA): ICD-10-CM

## 2018-10-15 ENCOUNTER — CARE COORDINATION (OUTPATIENT)
Dept: CARDIOLOGY | Facility: CLINIC | Age: 53
End: 2018-10-15

## 2018-10-15 DIAGNOSIS — E87.6 HYPOKALEMIA: ICD-10-CM

## 2018-10-15 DIAGNOSIS — I42.8 NONISCHEMIC CARDIOMYOPATHY (H): Primary | ICD-10-CM

## 2018-10-15 LAB — INTERPRETATION ECG - MUSE: NORMAL

## 2018-10-15 RX ORDER — POTASSIUM CHLORIDE 1500 MG/1
40 TABLET, EXTENDED RELEASE ORAL DAILY
Qty: 180 TABLET | Refills: 3 | Status: SHIPPED | OUTPATIENT
Start: 2018-10-15 | End: 2018-12-19

## 2018-10-15 NOTE — PROGRESS NOTES
Labs reviewed, new orders received: Take 60 mEq KCL today and then 40 mEq dailystarting tomorrow. Recheck labs in 4 days. Patient notified and is agreeable to plan.    Date: 10/15/2018    Time of Call: 2:39 PM     Diagnosis:  Hypokalemia     [ VORB ] Ordering provider: Dr. Beck  Order: 60 mEq KCL today. Take 40 mEq KCL daily starting tomorrow. Recheck labs in 4 days.       Order received by: Duong CARBONE RN     Follow-up/additional notes: Patient notified and is agreeable to plan.

## 2018-10-15 NOTE — PATIENT INSTRUCTIONS
It was a pleasure to see you in clinic today.  Please do not hesitate to call with any questions or concerns.  We look forward to seeing you in clinic at your next device check in 2 weeks.    Corinne Alberto, RN, MS, CCRN  Electrophysiology Nurse Clinician  [facility]    During Business Hours Please Call:  [phone]  After Hours Please Call:  [phone] - select option #[phone] and ask for job code [phone]

## 2018-10-15 NOTE — PROGRESS NOTES
Preliminary Device Interrogation Results.  Final physician signed paceart report to be scanned and attached.    Patient seen in clinic for evaluation and iterative programming of his [employer] multi lead ICD per MD orders.  Patient has an appointment to see Dr. Beck today.  No arrhythmias recorded.  Total PVC's over the past 28 days = 713,400.  Three or more PVC's over past 28 days = 26,200.  Intrinsic rhythm = irregular ventricular rate with frequent premature contractions @ ~ 60  bpm.  Intermittent sensing of P waves noted ranging between 0.1-0.3 mV.  Intermittently unable to obtain P wave measurements at all.  Difficult to assess if there is atrial capture present at max outputs.  Patient paced AAI at rates between 70 and 100 bpm without conduction noted.  12 EKG done which does not identify P waves.  Patient is taking Warfarin.  AP =60%.   = 59%.  BVP = 51%.  Estimated battery longevity to ZAY = 8 years.  RV and LV amplitudes increased to 2.4 V.  LRL increased to 80 bpm per Dr. Beck's orders.  Dr. Beck aware and present for interrogation.  Plan for patient to return to clinic in 2 weeks for next ICD check and appt with Dr. Beck.  Will plan to reassess atrial lead function at that time.  Patient routinely has his device checked at [facility].      Multi lead ICD

## 2018-10-17 ENCOUNTER — COMMUNICATION - HEALTHEAST (OUTPATIENT)
Dept: INTERNAL MEDICINE | Facility: CLINIC | Age: 53
End: 2018-10-17

## 2018-10-17 DIAGNOSIS — Q21.10 ASD (ATRIAL SEPTAL DEFECT): ICD-10-CM

## 2018-10-18 ENCOUNTER — COMMUNICATION - HEALTHEAST (OUTPATIENT)
Dept: INTERNAL MEDICINE | Facility: CLINIC | Age: 53
End: 2018-10-18

## 2018-10-18 ENCOUNTER — CARE COORDINATION (OUTPATIENT)
Dept: CARDIOLOGY | Facility: CLINIC | Age: 53
End: 2018-10-18

## 2018-10-18 DIAGNOSIS — D50.9 IRON DEFICIENCY ANEMIA: ICD-10-CM

## 2018-10-18 DIAGNOSIS — I48.91 ATRIAL FIBRILLATION (H): ICD-10-CM

## 2018-10-18 DIAGNOSIS — I42.8 NONISCHEMIC CARDIOMYOPATHY (H): Primary | ICD-10-CM

## 2018-10-18 NOTE — PROGRESS NOTES
Date: 10/18/2018    Time of Call: 10:44 AM     Diagnosis:  Abnormal lab results     [ VORB ] Ordering provider: Dr. Beck  Order:   1. Needs iron replacement IV   2. Needs to have potassium and magnesium checked today and have replaced, possibly IV depending on values   3. His TSH is elevated and needs to be followed up by having free T4 with his local doctor      Order received by: Duong CARBONE RN     Follow-up/additional notes: Infusion orders entered, patient to call [phone] to schedule.  Magnesium and Potassium lab orders entered. Will be faxed to local clinic.  Patient will be called and notified. Patient will also schedule cardiopulmonary stress test and right heart cath.

## 2018-10-19 ENCOUNTER — INFUSION THERAPY VISIT (OUTPATIENT)
Dept: INFUSION THERAPY | Facility: CLINIC | Age: 53
End: 2018-10-19
Attending: INTERNAL MEDICINE
Payer: MEDICARE

## 2018-10-19 VITALS
TEMPERATURE: 97.5 F | OXYGEN SATURATION: 98 % | HEART RATE: 89 BPM | SYSTOLIC BLOOD PRESSURE: 104 MMHG | DIASTOLIC BLOOD PRESSURE: 67 MMHG

## 2018-10-19 DIAGNOSIS — I42.8 NONISCHEMIC CARDIOMYOPATHY (H): ICD-10-CM

## 2018-10-19 DIAGNOSIS — D50.9 IRON DEFICIENCY ANEMIA: Primary | ICD-10-CM

## 2018-10-19 LAB
ANION GAP SERPL CALCULATED.3IONS-SCNC: 9 MMOL/L (ref 3–14)
BUN SERPL-MCNC: 31 MG/DL (ref 7–30)
CALCIUM SERPL-MCNC: 9.3 MG/DL (ref 8.5–10.1)
CHLORIDE SERPL-SCNC: 93 MMOL/L (ref 94–109)
CO2 SERPL-SCNC: 26 MMOL/L (ref 20–32)
CREAT SERPL-MCNC: 1.38 MG/DL (ref 0.66–1.25)
GFR SERPL CREATININE-BSD FRML MDRD: 53 ML/MIN/1.7M2
GLUCOSE SERPL-MCNC: 101 MG/DL (ref 70–99)
MAGNESIUM SERPL-MCNC: 2.3 MG/DL (ref 1.6–2.3)
POTASSIUM SERPL-SCNC: 4.1 MMOL/L (ref 3.4–5.3)
SODIUM SERPL-SCNC: 128 MMOL/L (ref 133–144)

## 2018-10-19 PROCEDURE — 96365 THER/PROPH/DIAG IV INF INIT: CPT

## 2018-10-19 PROCEDURE — 83735 ASSAY OF MAGNESIUM: CPT | Performed by: INTERNAL MEDICINE

## 2018-10-19 PROCEDURE — 25000128 H RX IP 250 OP 636: Mod: ZF | Performed by: INTERNAL MEDICINE

## 2018-10-19 PROCEDURE — 80048 BASIC METABOLIC PNL TOTAL CA: CPT | Performed by: INTERNAL MEDICINE

## 2018-10-19 RX ADMIN — FERRIC CARBOXYMALTOSE INJECTION 750 MG: 50 INJECTION, SOLUTION INTRAVENOUS at 11:17

## 2018-10-19 NOTE — PROGRESS NOTES
Nursing Note  Everton Larios presents today to [facility] for:   Chief Complaint   Patient presents with     Infusion     injectafer     During today's [facility] appointment, orders from Dr. Beck were completed.  Frequency: today is dose 1 of 2 total.    Progress note:  Patient identification verified by name and date of birth.  Assessment completed.  Vitals recorded in Doc Flowsheets.  Patient was provided with education regarding infusion and possible side effects.  Patient verbalized understanding.      needed: No  Premedications: were not ordered.  Infusion Rates: 400 ml/hr.  Approximate Infusion length:20 minutes.   Labs: were drawn per orders.   Vascular access: peripheral IV placed today.  Treatment Conditions: patient monitored for 30 minutes after medication was infused.  Patient tolerated infusion: well.    Drug Waste Record? No     Discharge Plan:   Follow up plan of care with: ongoing infusions at [facility]. and primary medical doctor.  Discharge instructions were reviewed with patient.  Patient/representative verbalized understanding of discharge instructions and all questions answered.  Patient discharged from [facility] in stable condition.    Neli Mobley RN      Administrations This Visit     ferric carboxymaltose (INJECTAFER) 750 mg in sodium chloride 0.9 % 100 mL intermittent infusion     Admin Date Action Dose Route Administered By             10/19/2018 New Bag 750 mg Intravenous Neli Mobley RN                          /67  Pulse 89  Temp 97.5  F (36.4  C) (Oral)  SpO2 98%

## 2018-10-19 NOTE — MR AVS SNAPSHOT
After Visit Summary   10/19/2018    Everton Larios    MRN: [id number]           Patient Information     Date Of Birth          1965        Visit Information        Provider Department      10/19/2018 11:00 AM SERA 51 ATC; SER[facility] SPEC [facility] Procedure        Today's Diagnoses     Iron deficiency anemia    -  1      Care Instructions      Ferric carboxymaltose Solution for injection  What is this medicine?  FERRIC CARBOXYMALTOSE (ferr-ik car-box-ee-mol-toes) is an iron complex. Iron is used to make healthy red blood cells, which carry oxygen and nutrients throughout the body. This medicine is used to treat anemia in people with chronic kidney disease or people who cannot take iron by mouth.  This medicine may be used for other purposes; ask your health care provider or pharmacist if you have questions.  What should I tell my health care provider before I take this medicine?  They need to know if you have any of these conditions:    anemia not caused by low iron levels    high levels of iron in the blood    liver disease    an unusual or allergic reaction to iron, other medicines, foods, dyes, or preservatives    pregnant or trying to get pregnant    breast-feeding  How should I use this medicine?  This medicine is for infusion into a vein. It is given by a health care professional in a hospital or clinic setting.  Talk to your pediatrician regarding the use of this medicine in children. Special care may be needed.  Overdosage: If you think you've taken too much of this medicine contact a poison control center or emergency room at once.  NOTE: This medicine is only for you. Do not share this medicine with others.  What if I miss a dose?  It is important not to miss your dose. Call your doctor or health care professional if you are unable to keep an appointment.  What may interact with this medicine?  Do not take this medicine with any of the following  medications:  deferoxamine  dimercaprol  other iron products  This medicine may also interact with the following medications:  chloramphenicol  deferasirox  This list may not describe all possible interactions. Give your health care provider a list of all the medicines, herbs, non-prescription drugs, or dietary supplements you use. Also tell them if you smoke, drink alcohol, or use illegal drugs. Some items may interact with your medicine.  What should I watch for while using this medicine?  Visit your doctor or health care professional regularly. Tell your doctor if your symptoms do not start to get better or if they get worse. You may need blood work done while you are taking this medicine.  You may need to follow a special diet. Talk to your doctor. Foods that contain iron include: whole grains/cereals, dried fruits, beans, or peas, leafy green vegetables, and organ meats (liver, kidney).  What side effects may I notice from receiving this medicine?  Side effects that you should report to your doctor or health care professional as soon as possible:  allergic reactions like skin rash, itching or hives, swelling of the face, lips, or tonguebreathing problems  changes in blood pressure  feeling faint or lightheaded, falls  flushing, sweating, or hot feelings  Side effects that usually do not require medical attention (Report these to your doctor or health care professional if they continue or are bothersome.):  changes in taste  constipation  dizziness  headache  nausea  pain, redness, or irritation at site where injected  vomiting  This list may not describe all possible side effects. Call your doctor for medical advice about side effects. You may report side effects to FDA at 7-718-FDA-0011.  Where should I keep my medicine?  This drug is given in a hospital or clinic and will not be stored at home.  NOTE: This sheet is a summary. It may not cover all possible information. If you have questions about this  medicine, talk to your doctor, pharmacist, or health care provider.  NOTE:This sheet is a summary. It may not cover all possible information. If you have questions about this medicine, talk to your doctor, pharmacist, or health care provider. Copyright  2016 Gold Standard                Follow-ups after your visit        Your next 10 appointments already scheduled     Oct 19, 2018  1:30 PM CDT   Lab with UC LAB   [facility] ([facility])    [address]  1st Floor  [facility] [id number] [phone]            Oct 22, 2018  8:30 AM CDT   Card Cardpul Stress Tst Adult with UUEKGS   [facility] ([facility])    [address] [facility] [place] [address]               Oct 22, 2018 12:30 PM CDT   LAB with UU LAB GOLD WAITING   [facility]    [address], [place] [address]              Please do not eat 10-12 hours before your appointment if you are coming in fasting for labs on lipids, cholesterol, or glucose (sugar). This does not apply to pregnant women. Water, hot tea and black coffee (with nothing added) are okay. Do not drink other fluids, diet soda or chew gum.            Oct 22, 2018  1:00 PM CDT   Procedure 1 hour with U2A ROOM 9   [address] [facility] ([facility])    [address] [facility] [place] [address]               Oct 22, 2018  2:00 PM CDT   Heart Cath Right Heart Cath with UUHCVR4   [facility]    [address] [facility] [place] [address] [phone]            Oct 26, 2018  2:00 PM CDT   Infusion 120 with UC SPEC INFUSION, UC 47 ATC   [facility] Special[facility] Procedure ([facility])    [address]  [address]  [facility] [id number] [phone]             "  Future tests that were ordered for you today     Open Future Orders        Priority Expected Expires Ordered    Magnesium Routine 10/18/2018 10/18/2019 10/18/2018    Potassium Routine 10/18/2018 10/18/2019 10/18/2018            Who to contact     If you have questions or need follow up information about today's clinic visit or your schedule please contact [facility] SPECIALTY AND PROCEDURE directly at [phone].  Normal or non-critical lab and imaging results will be communicated to you by ArchiveSocialhart, letter or phone within 4 business days after the clinic has received the results. If you do not hear from us within 7 days, please contact the clinic through [facility] or phone. If you have a critical or abnormal lab result, we will notify you by phone as soon as possible.  Submit refill requests through Xet[employer]ave or call your pharmacy and they will forward the refill request to us. Please allow 3 business days for your refill to be completed.          Additional Information About Your Visit        Xet[employer]ave Information     Xet[employer]ave lets you send messages to your doctor, view your test results, renew your prescriptions, schedule appointments and more. To sign up, go to [email or web address] . Click on \"Log in\" on the left side of the screen, which will take you to the Welcome page. Then click on \"Sign up Now\" on the right side of the page.     You will be asked to enter the access code listed below, as well as some personal information. Please follow the directions to create your username and password.     Your access code is: [email or web address]  Expires: 2018  3:27 PM     Your access code will  in 90 days. If you need help or a new code, please call your [place] clinic or [phone].        Care EveryWhere ID     This is your Care EveryWhere ID. This could be used by other organizations to access your [place] medical records  [id number]         Blood Pressure from Last 3 Encounters: "   10/11/18 99/62    Weight from Last 3 Encounters:   10/11/18 72.5 kg (159 lb 12.8 oz)              Today, you had the following     No orders found for display       Primary Care Provider Office Phone # Fax #    Fredrick Wolff [phone]       [address] [address]        Equal Access to Services     SHELLI [facility] : Hadii aad ku hadasho Soomaali, waaxda luqadaha, qaybta kaalmada adeegyada, waxay idiin hayaan adeeg khharsh la'aan ah. So [facility] [phone].    ATENCIÓN: Si habla español, tiene a anaya disposición servicios gratuitos de asistencia lingüística. [facility] [phone].    We comply with applicable federal civil rights laws and [place] laws. We do not discriminate on the basis of race, color, national origin, age, disability, sex, sexual orientation, or gender identity.            Thank you!     Thank you for choosing [facility] SPECIALTY AND PROCEDURE  for your care. Our goal is always to provide you with excellent care. Hearing back from our patients is one way we can continue to improve our services. Please take a few minutes to complete the written survey that you may receive in the mail after your visit with us. Thank you!             Your Updated Medication List - Protect others around you: Learn how to safely use, store and throw away your medicines at www.disposemymeds.org.          This list is accurate as of 10/19/18 11:00 AM.  Always use your most recent med list.                   Brand Name Dispense Instructions for use Diagnosis    albuterol 108 (90 Base) MCG/ACT inhaler    PROAIR HFA/PROVENTIL HFA/VENTOLIN HFA     Inhale 2 puffs into the lungs every 6 hours as needed for shortness of breath / dyspnea or wheezing    Intermittent asthma without complication, unspecified asthma severity       amiodarone 200 MG tablet    PACERONE/CODARONE     Take 1 tablet (200 mg) by mouth daily    Paroxysmal atrial fibrillation (H)        ASACOL  MG EC tablet   Generic drug:  mesalamine      Take 3 tablets (2,400 mg) by mouth 2 times daily    Ulcerative colitis with complication, unspecified location (H)       azelastine 0.1 % nasal spray    ASTELIN     Spray 2 sprays into both nostrils 2 times daily    Chronic rhinitis       BENTYL 10 MG capsule   Generic drug:  dicyclomine      Take 1 capsule (10 mg) by mouth 3 times daily as needed (ulcerative colitis)    Ulcerative colitis with complication, unspecified location (H)       carvedilol 12.5 MG tablet    COREG     Take 1 tablet (12.5 mg) by mouth 2 times daily (with meals)    Chronic systolic heart failure (H)       CYMBALTA 30 MG EC capsule   Generic drug:  DULoxetine      Take 1 capsule (30 mg) by mouth daily    Depression, unspecified depression type       FLOMAX 0.4 MG capsule   Generic drug:  tamsulosin      Take 1 capsule (0.4 mg) by mouth daily (with dinner)    Benign prostatic hyperplasia, unspecified whether lower urinary tract symptoms present       furosemide 80 MG tablet    LASIX     Take one tab (80 mg) in am and 1/2 tab (40 mg) in pm    Chronic systolic heart failure (H)       levothyroxine 50 MCG tablet    SYNTHROID/LEVOTHROID     Take 1 tablet (50 mcg) by mouth daily    Hypothyroidism due to medication       MIRALAX Packet   Generic drug:  polyethylene glycol      Take 17 g by mouth 4 times daily    Constipation, unspecified constipation type       omeprazole 40 MG capsule    priLOSEC     Take 1 capsule (40 mg) by mouth daily In pm    Nausea       ondansetron 4 MG ODT tab    ZOFRAN-ODT     Take 1 tablet (4 mg) by mouth every 8 hours as needed for nausea    Nausea       potassium chloride SA 20 MEQ CR tablet    K-DUR/KLOR-CON M    180 tablet    Take 2 tablets (40 mEq) by mouth daily    Hypokalemia       Simethicone 125 MG Caps      Take 1 capsule by mouth 4 times daily    Nausea       SINGULAIR 10 MG tablet   Generic drug:  montelukast      Take 1 tablet (10 mg) by mouth At  Bedtime    Intermittent asthma without complication, unspecified asthma severity       spironolactone 25 MG tablet    ALDACTONE     Take 1 tablet (25 mg) by mouth daily    Chronic systolic heart failure (H)       warfarin 5 MG tablet    COUMADIN     Take 2.5 mg 2 dys/wk and 5 mg all others    Paroxysmal atrial fibrillation (H)

## 2018-10-19 NOTE — PROGRESS NOTES
All appointments made. Patient was contacted.   Christopher Dutton [facility] Heart Failure Admin/Referrals

## 2018-10-19 NOTE — PATIENT INSTRUCTIONS
Ferric carboxymaltose Solution for injection  What is this medicine?  FERRIC CARBOXYMALTOSE (ferr-ik car-box-ee-mol-toes) is an iron complex. Iron is used to make healthy red blood cells, which carry oxygen and nutrients throughout the body. This medicine is used to treat anemia in people with chronic kidney disease or people who cannot take iron by mouth.  This medicine may be used for other purposes; ask your health care provider or pharmacist if you have questions.  What should I tell my health care provider before I take this medicine?  They need to know if you have any of these conditions:    anemia not caused by low iron levels    high levels of iron in the blood    liver disease    an unusual or allergic reaction to iron, other medicines, foods, dyes, or preservatives    pregnant or trying to get pregnant    breast-feeding  How should I use this medicine?  This medicine is for infusion into a vein. It is given by a health care professional in a hospital or clinic setting.  Talk to your pediatrician regarding the use of this medicine in children. Special care may be needed.  Overdosage: If you think you've taken too much of this medicine contact a poison control center or emergency room at once.  NOTE: This medicine is only for you. Do not share this medicine with others.  What if I miss a dose?  It is important not to miss your dose. Call your doctor or health care professional if you are unable to keep an appointment.  What may interact with this medicine?  Do not take this medicine with any of the following medications:  deferoxamine  dimercaprol  other iron products  This medicine may also interact with the following medications:  chloramphenicol  deferasirox  This list may not describe all possible interactions. Give your health care provider a list of all the medicines, herbs, non-prescription drugs, or dietary supplements you use. Also tell them if you smoke, drink alcohol, or use illegal drugs. Some  items may interact with your medicine.  What should I watch for while using this medicine?  Visit your doctor or health care professional regularly. Tell your doctor if your symptoms do not start to get better or if they get worse. You may need blood work done while you are taking this medicine.  You may need to follow a special diet. Talk to your doctor. Foods that contain iron include: whole grains/cereals, dried fruits, beans, or peas, leafy green vegetables, and organ meats (liver, kidney).  What side effects may I notice from receiving this medicine?  Side effects that you should report to your doctor or health care professional as soon as possible:  allergic reactions like skin rash, itching or hives, swelling of the face, lips, or tonguebreathing problems  changes in blood pressure  feeling faint or lightheaded, falls  flushing, sweating, or hot feelings  Side effects that usually do not require medical attention (Report these to your doctor or health care professional if they continue or are bothersome.):  changes in taste  constipation  dizziness  headache  nausea  pain, redness, or irritation at site where injected  vomiting  This list may not describe all possible side effects. Call your doctor for medical advice about side effects. You may report side effects to FDA at 1-553-FDA-8111.  Where should I keep my medicine?  This drug is given in a hospital or clinic and will not be stored at home.  NOTE: This sheet is a summary. It may not cover all possible information. If you have questions about this medicine, talk to your doctor, pharmacist, or health care provider.  NOTE:This sheet is a summary. It may not cover all possible information. If you have questions about this medicine, talk to your doctor, pharmacist, or health care provider. Copyright  2016 Gold Standard

## 2018-10-22 ENCOUNTER — COMMUNICATION - HEALTHEAST (OUTPATIENT)
Dept: INTERNAL MEDICINE | Facility: CLINIC | Age: 53
End: 2018-10-22

## 2018-10-22 ENCOUNTER — HOSPITAL ENCOUNTER (OUTPATIENT)
Dept: CARDIOLOGY | Facility: CLINIC | Age: 53
End: 2018-10-22
Attending: INTERNAL MEDICINE
Payer: MEDICARE

## 2018-10-22 ENCOUNTER — APPOINTMENT (OUTPATIENT)
Dept: MEDSURG UNIT | Facility: CLINIC | Age: 53
End: 2018-10-22
Attending: INTERNAL MEDICINE
Payer: MEDICARE

## 2018-10-22 ENCOUNTER — HOSPITAL ENCOUNTER (OUTPATIENT)
Facility: CLINIC | Age: 53
Discharge: HOME | End: 2018-10-22
Attending: INTERNAL MEDICINE | Admitting: INTERNAL MEDICINE
Payer: MEDICARE

## 2018-10-22 ENCOUNTER — TELEPHONE (OUTPATIENT)
Dept: CARDIOLOGY | Facility: CLINIC | Age: 53
End: 2018-10-22

## 2018-10-22 VITALS
OXYGEN SATURATION: 96 % | RESPIRATION RATE: 16 BRPM | HEART RATE: 79 BPM | TEMPERATURE: 97.2 F | BODY MASS INDEX: 24.3 KG/M2 | HEIGHT: 68 IN | SYSTOLIC BLOOD PRESSURE: 92 MMHG | DIASTOLIC BLOOD PRESSURE: 62 MMHG

## 2018-10-22 DIAGNOSIS — Q21.10 ASD (ATRIAL SEPTAL DEFECT): ICD-10-CM

## 2018-10-22 DIAGNOSIS — I50.22 CHRONIC SYSTOLIC HEART FAILURE (H): ICD-10-CM

## 2018-10-22 DIAGNOSIS — I51.89 OTHER ILL-DEFINED HEART DISEASES: ICD-10-CM

## 2018-10-22 PROBLEM — Z79.899 ON AMIODARONE THERAPY: Chronic | Status: ACTIVE | Noted: 2018-10-04

## 2018-10-22 PROBLEM — F32.A DEPRESSION: Chronic | Status: ACTIVE | Noted: 2018-10-04

## 2018-10-22 PROBLEM — I48.0 PAROXYSMAL ATRIAL FIBRILLATION (H): Chronic | Status: ACTIVE | Noted: 2018-10-04

## 2018-10-22 PROBLEM — Z87.74 H/O CONGENITAL ATRIAL SEPTAL DEFECT (ASD) REPAIR: Chronic | Status: ACTIVE | Noted: 2018-10-04

## 2018-10-22 PROBLEM — Z95.810 ICD (IMPLANTABLE CARDIOVERTER-DEFIBRILLATOR) IN PLACE: Chronic | Status: ACTIVE | Noted: 2018-10-04

## 2018-10-22 PROBLEM — J45.20 INTERMITTENT ASTHMA WITHOUT COMPLICATION: Chronic | Status: ACTIVE | Noted: 2018-10-04

## 2018-10-22 PROBLEM — Z86.79 S/P ABLATION OF ATRIAL FIBRILLATION: Chronic | Status: ACTIVE | Noted: 2018-10-04

## 2018-10-22 PROBLEM — E03.2 HYPOTHYROIDISM DUE TO MEDICATION: Chronic | Status: ACTIVE | Noted: 2018-10-04

## 2018-10-22 PROBLEM — I42.8 NONISCHEMIC CARDIOMYOPATHY (H): Chronic | Status: ACTIVE | Noted: 2018-10-04

## 2018-10-22 PROBLEM — Z98.890 S/P ABLATION OF ATRIAL FIBRILLATION: Chronic | Status: ACTIVE | Noted: 2018-10-04

## 2018-10-22 PROBLEM — K22.70 BARRETT'S ESOPHAGUS: Chronic | Status: ACTIVE | Noted: 2018-10-04

## 2018-10-22 LAB
ALBUMIN SERPL-MCNC: 3.7 G/DL (ref 3.4–5)
ALP SERPL-CCNC: 175 U/L (ref 40–150)
ALT SERPL W P-5'-P-CCNC: 44 U/L (ref 0–70)
ANION GAP SERPL CALCULATED.3IONS-SCNC: 8 MMOL/L (ref 3–14)
AST SERPL W P-5'-P-CCNC: 38 U/L (ref 0–45)
B-HCG FREE SERPL-ACNC: 4.8 [IU]/L (ref 0.86–1.14)
B-HCG FREE SERPL-ACNC: 4.9 [IU]/L (ref 0.86–1.14)
BASOPHILS # BLD AUTO: 0 10E9/L (ref 0–0.2)
BASOPHILS NFR BLD AUTO: 0.3 %
BILIRUB SERPL-MCNC: 1 MG/DL (ref 0.2–1.3)
BUN SERPL-MCNC: 33 MG/DL (ref 7–30)
CALCIUM SERPL-MCNC: 8.9 MG/DL (ref 8.5–10.1)
CHLORIDE SERPL-SCNC: 92 MMOL/L (ref 94–109)
CO2 SERPL-SCNC: 32 MMOL/L (ref 20–32)
CREAT SERPL-MCNC: 1.88 MG/DL (ref 0.66–1.25)
DIFFERENTIAL METHOD BLD: ABNORMAL
EOSINOPHIL # BLD AUTO: 0.1 10E9/L (ref 0–0.7)
EOSINOPHIL NFR BLD AUTO: 0.9 %
ERYTHROCYTE [DISTWIDTH] IN BLOOD BY AUTOMATED COUNT: 18.6 % (ref 10–15)
GFR SERPL CREATININE-BSD FRML MDRD: 37 ML/MIN/1.7M2
GLUCOSE SERPL-MCNC: 140 MG/DL (ref 70–99)
HCT VFR BLD AUTO: 50.2 % (ref 40–53)
HGB BLD-MCNC: 16.4 G/DL (ref 13.3–17.7)
IMM GRANULOCYTES # BLD: 0 10E9/L (ref 0–0.4)
IMM GRANULOCYTES NFR BLD: 0.5 %
INR PPP: 4.9 (ref 0.9–1.1)
LYMPHOCYTES # BLD AUTO: 1.5 10E9/L (ref 0.8–5.3)
LYMPHOCYTES NFR BLD AUTO: 23.4 %
MCH RBC QN AUTO: 28.1 PG (ref 26.5–33)
MCHC RBC AUTO-ENTMCNC: 32.7 G/DL (ref 31.5–36.5)
MCV RBC AUTO: 86 FL (ref 78–100)
MONOCYTES # BLD AUTO: 0.6 10E9/L (ref 0–1.3)
MONOCYTES NFR BLD AUTO: 8.6 %
NEUTROPHILS # BLD AUTO: 4.2 10E9/L (ref 1.6–8.3)
NEUTROPHILS NFR BLD AUTO: 66.3 %
NRBC # BLD AUTO: 0 10*3/UL
NRBC BLD AUTO-RTO: 1 /100
PLATELET # BLD AUTO: 251 10E9/L (ref 150–450)
POTASSIUM SERPL-SCNC: 3.9 MMOL/L (ref 3.4–5.3)
PROT SERPL-MCNC: 8 G/DL (ref 6.8–8.8)
RBC # BLD AUTO: 5.84 10E12/L (ref 4.4–5.9)
SODIUM SERPL-SCNC: 133 MMOL/L (ref 133–144)
WBC # BLD AUTO: 6.4 10E9/L (ref 4–11)

## 2018-10-22 PROCEDURE — 80053 COMPREHEN METABOLIC PANEL: CPT | Performed by: INTERNAL MEDICINE

## 2018-10-22 PROCEDURE — 86141 C-REACTIVE PROTEIN HS: CPT | Performed by: INTERNAL MEDICINE

## 2018-10-22 PROCEDURE — 36415 COLL VENOUS BLD VENIPUNCTURE: CPT | Performed by: INTERNAL MEDICINE

## 2018-10-22 PROCEDURE — 85025 COMPLETE CBC W/AUTO DIFF WBC: CPT | Performed by: INTERNAL MEDICINE

## 2018-10-22 PROCEDURE — 85610 PROTHROMBIN TIME: CPT

## 2018-10-22 PROCEDURE — 25000125 ZZHC RX 250: Performed by: INTERNAL MEDICINE

## 2018-10-22 PROCEDURE — 94621 CARDIOPULM EXERCISE TESTING: CPT | Mod: 26 | Performed by: INTERNAL MEDICINE

## 2018-10-22 PROCEDURE — 40000172 ZZH STATISTIC PROCEDURE PREP ONLY

## 2018-10-22 PROCEDURE — 94621 CARDIOPULM EXERCISE TESTING: CPT | Performed by: INTERNAL MEDICINE

## 2018-10-22 RX ORDER — LIDOCAINE 40 MG/G
CREAM TOPICAL
Status: DISCONTINUED | OUTPATIENT
Start: 2018-10-22 | End: 2018-10-22 | Stop reason: HOSPADM

## 2018-10-22 RX ORDER — LIDOCAINE 40 MG/G
CREAM TOPICAL
Status: COMPLETED | OUTPATIENT
Start: 2018-10-22 | End: 2018-10-22

## 2018-10-22 RX ADMIN — LIDOCAINE: 40 CREAM TOPICAL at 13:11

## 2018-10-22 NOTE — PROGRESS NOTES
POC INR = 4.8, rechecked at 4.9.  JOSE Schmid aware & will be discussing plan of care with cardiologist.

## 2018-10-22 NOTE — PROGRESS NOTES
[facility] Catheterization Lab  Date: 10/22/2018    History of Present Illness:  Everton Larios is a 53 year old male with PMH significant of carrera's esophagus, chronic systolic CHF, depression, hx of ASD (repair age 3), hypothyroidism, ICD, asthma, and paroxysmal Afib. The patient was recently seen by Dr. Beck. Per notation, the patient was to undergo cardiopulmonary stress testing with determination of oxygen consumption in addition to right heart catheterization. Unfortuneatly, the patient's INR was 4.8.  This was repeated and remained 4.9.  Per interventional cardiology staff, case will be cancelled and will need to be rescheduled at a later date.    Sharmaine Mariscal PA-C  [facility]

## 2018-10-22 NOTE — PROGRESS NOTES
Procedure cancelled due to elevated INR.  Discharged per ambulatory.  Will follow-up with Dr. Beck re rescheduling.

## 2018-10-22 NOTE — TELEPHONE ENCOUNTER
"Patient called the device clinic today to report that since his LRL was increased to 80 bpm on 10/11/18 per Dr. Beck's orders he has been feeling \"much worse\".  Patient reports that he is feeling more SOB, retaining fluid, fatigued and unable to do much activity.  Patient is requesting to have his LRL decreased back down to 60 bpm.  Offered patient a device appt on Wednesday, October 24th, 2018 when Dr. Beck is in clinic.  Patient declined and said that he is going to call [facility] to see if they can see him in the device clinic.  Patient states that the [facility] clinic is much closer and convenient for him.  Encouraged patient to call back to schedule an appt if needed.    "

## 2018-10-23 ENCOUNTER — COMMUNICATION - HEALTHEAST (OUTPATIENT)
Dept: CARDIOLOGY | Facility: CLINIC | Age: 53
End: 2018-10-23

## 2018-10-23 LAB — CRP SERPL HS-MCNC: 12.5 MG/L

## 2018-10-24 ENCOUNTER — AMBULATORY - HEALTHEAST (OUTPATIENT)
Dept: LAB | Facility: CLINIC | Age: 53
End: 2018-10-24

## 2018-10-24 ENCOUNTER — COMMUNICATION - HEALTHEAST (OUTPATIENT)
Dept: ANTICOAGULATION | Facility: CLINIC | Age: 53
End: 2018-10-24

## 2018-10-24 DIAGNOSIS — I48.92 ATRIAL FLUTTER (H): ICD-10-CM

## 2018-10-24 DIAGNOSIS — I48.0 PAROXYSMAL ATRIAL FIBRILLATION (H): ICD-10-CM

## 2018-10-24 DIAGNOSIS — Q21.10 ASD (ATRIAL SEPTAL DEFECT): ICD-10-CM

## 2018-10-24 LAB — INR PPP: 2 (ref 0.9–1.1)

## 2018-10-25 ENCOUNTER — CARE COORDINATION (OUTPATIENT)
Dept: CARDIOLOGY | Facility: CLINIC | Age: 53
End: 2018-10-25

## 2018-10-25 DIAGNOSIS — I51.89 OTHER ILL-DEFINED HEART DISEASES: Primary | ICD-10-CM

## 2018-10-25 DIAGNOSIS — I42.9 CARDIOMYOPATHY, UNSPECIFIED TYPE (H): ICD-10-CM

## 2018-10-25 RX ORDER — LIDOCAINE 40 MG/G
CREAM TOPICAL
Status: CANCELLED | OUTPATIENT
Start: 2018-10-25

## 2018-10-26 ENCOUNTER — CARE COORDINATION (OUTPATIENT)
Dept: CARDIOLOGY | Facility: CLINIC | Age: 53
End: 2018-10-26
Payer: COMMERCIAL

## 2018-10-26 ENCOUNTER — INFUSION THERAPY VISIT (OUTPATIENT)
Dept: INFUSION THERAPY | Facility: CLINIC | Age: 53
End: 2018-10-26
Attending: INTERNAL MEDICINE
Payer: MEDICARE

## 2018-10-26 VITALS
DIASTOLIC BLOOD PRESSURE: 61 MMHG | HEART RATE: 71 BPM | RESPIRATION RATE: 16 BRPM | TEMPERATURE: 98.3 F | SYSTOLIC BLOOD PRESSURE: 92 MMHG | OXYGEN SATURATION: 98 %

## 2018-10-26 DIAGNOSIS — D50.9 IRON DEFICIENCY ANEMIA: Primary | ICD-10-CM

## 2018-10-26 PROCEDURE — 96365 THER/PROPH/DIAG IV INF INIT: CPT

## 2018-10-26 PROCEDURE — 25000128 H RX IP 250 OP 636: Mod: ZF | Performed by: INTERNAL MEDICINE

## 2018-10-26 RX ADMIN — FERRIC CARBOXYMALTOSE INJECTION 750 MG: 50 INJECTION, SOLUTION INTRAVENOUS at 14:50

## 2018-10-26 NOTE — PROGRESS NOTES
Nursing Note  Everton Larios [facility]:   Chief Complaint   Patient presents with     Infusion     injectafer     During today's [facility] appointment, orders from Dr. Rosendo Beck were completed.  Frequency: today is dose 2 of 2 total.    Progress note:  Patient identification verified by name and date of birth.  Assessment completed.  Vitals recorded in Doc Flowsheets.  Patient was provided with education regarding infusion and possible side effects.  Patient verbalized understanding.      needed: No  Premedications: were not ordered.  Infusion Rates: 500 ml/hr.  Approximate Infusion length:1 hours.   Labs: were not ordered for this appointment.  Vascular access: peripheral IV placed today.  Treatment Conditions: patient monitored for 30 minutes after medication was infused.  Patient tolerated infusion: well.    Discharge Plan:   Follow up plan of care with: primary medical doctor.  Discharge instructions were reviewed with patient.  Patient/representative verbalized understanding of discharge instructions and all questions answered.  [facility] in stable condition.    Bhumika Langley RN    Administrations This Visit     ferric carboxymaltose (INJECTAFER) 750 mg in sodium chloride 0.9 % 100 mL intermittent infusion     Admin Date Action Dose Route Administered By             10/26/2018 New Bag 750 mg Intravenous Bhumika Langley RN                          BP 95/62  Pulse 80  Temp 98.3  F (36.8  C) (Oral)  Resp 16  SpO2 98%

## 2018-10-26 NOTE — PATIENT INSTRUCTIONS
Dear Everton Larios    Thank you for choosing [facility] ([facility]) for your infusion.  The following information is a summary of our appointment as well as important reminders.      We look forward in seeing you on your next appointment here at [facility].  Please don t hesitate to call us at [phone] to reschedule any of your appointments or to speak with one of the [facility] registered nurses.  It was a pleasure taking care of you today.    Sincerely,    [facility]  [address] [place] [address]  [address]  Phone:  [phone]      Ferric carboxymaltose Solution for injection  What is this medicine?  FERRIC CARBOXYMALTOSE (ferr-ik car-box-ee-mol-toes) is an iron complex. Iron is used to make healthy red blood cells, which carry oxygen and nutrients throughout the body. This medicine is used to treat anemia in people with chronic kidney disease or people who cannot take iron by mouth.  This medicine may be used for other purposes; ask your health care provider or pharmacist if you have questions.  What should I tell my health care provider before I take this medicine?  They need to know if you have any of these conditions:    anemia not caused by low iron levels    high levels of iron in the blood    liver disease    an unusual or allergic reaction to iron, other medicines, foods, dyes, or preservatives    pregnant or trying to get pregnant    breast-feeding  How should I use this medicine?  This medicine is for infusion into a vein. It is given by a health care professional in a hospital or clinic setting.  Talk to your pediatrician regarding the use of this medicine in children. Special care may be needed.  Overdosage: If you think you've taken too much of this medicine contact a poison control center or emergency room at once.  NOTE: This medicine is only for you. Do not share this  medicine with others.  What if I miss a dose?  It is important not to miss your dose. Call your doctor or health care professional if you are unable to keep an appointment.  What may interact with this medicine?  Do not take this medicine with any of the following medications:  deferoxamine  dimercaprol  other iron products  This medicine may also interact with the following medications:  chloramphenicol  deferasirox  This list may not describe all possible interactions. Give your health care provider a list of all the medicines, herbs, non-prescription drugs, or dietary supplements you use. Also tell them if you smoke, drink alcohol, or use illegal drugs. Some items may interact with your medicine.  What should I watch for while using this medicine?  Visit your doctor or health care professional regularly. Tell your doctor if your symptoms do not start to get better or if they get worse. You may need blood work done while you are taking this medicine.  You may need to follow a special diet. Talk to your doctor. Foods that contain iron include: whole grains/cereals, dried fruits, beans, or peas, leafy green vegetables, and organ meats (liver, kidney).  What side effects may I notice from receiving this medicine?  Side effects that you should report to your doctor or health care professional as soon as possible:  allergic reactions like skin rash, itching or hives, swelling of the face, lips, or tonguebreathing problems  changes in blood pressure  feeling faint or lightheaded, falls  flushing, sweating, or hot feelings  Side effects that usually do not require medical attention (Report these to your doctor or health care professional if they continue or are bothersome.):  changes in taste  constipation  dizziness  headache  nausea  pain, redness, or irritation at site where injected  vomiting  This list may not describe all possible side effects. Call your doctor for medical advice about side effects. You may report  side effects to FDA at 3-155-FDA-2246.  Where should I keep my medicine?  This drug is given in a hospital or clinic and will not be stored at home.  NOTE: This sheet is a summary. It may not cover all possible information. If you have questions about this medicine, talk to your doctor, pharmacist, or health care provider.  NOTE:This sheet is a summary. It may not cover all possible information. If you have questions about this medicine, talk to your doctor, pharmacist, or health care provider. Copyright  2016 Gold Standard

## 2018-10-26 NOTE — MR AVS SNAPSHOT
After Visit Summary   10/26/2018    Everton Larios    MRN: [id number]           Patient Information     Date Of Birth          1965        Visit Information        Provider Department      10/26/2018 2:00 PM UC [facility] ATC;  SPEC [facility] Specialty and Procedure        Today's Diagnoses     Iron deficiency anemia    -  1      Care Instructions    Dear Everton Larios    Thank you for choosing [facility] ([facility]) for your infusion.  The following information is a summary of our appointment as well as important reminders.      We look forward in seeing you on your next appointment here at [facility].  Please don t hesitate to call us at [phone] to reschedule any of your appointments or to speak with one of the [facility] registered nurses.  It was a pleasure taking care of you today.    Sincerely,    [facility]  [address] [place] [address]  [address]  Phone:  [phone]      Ferric carboxymaltose Solution for injection  What is this medicine?  FERRIC CARBOXYMALTOSE (ferr-ik car-box-ee-mol-toes) is an iron complex. Iron is used to make healthy red blood cells, which carry oxygen and nutrients throughout the body. This medicine is used to treat anemia in people with chronic kidney disease or people who cannot take iron by mouth.  This medicine may be used for other purposes; ask your health care provider or pharmacist if you have questions.  What should I tell my health care provider before I take this medicine?  They need to know if you have any of these conditions:    anemia not caused by low iron levels    high levels of iron in the blood    liver disease    an unusual or allergic reaction to iron, other medicines, foods, dyes, or preservatives    pregnant or trying to get pregnant    breast-feeding  How should I use this  medicine?  This medicine is for infusion into a vein. It is given by a health care professional in a hospital or clinic setting.  Talk to your pediatrician regarding the use of this medicine in children. Special care may be needed.  Overdosage: If you think you've taken too much of this medicine contact a poison control center or emergency room at once.  NOTE: This medicine is only for you. Do not share this medicine with others.  What if I miss a dose?  It is important not to miss your dose. Call your doctor or health care professional if you are unable to keep an appointment.  What may interact with this medicine?  Do not take this medicine with any of the following medications:  deferoxamine  dimercaprol  other iron products  This medicine may also interact with the following medications:  chloramphenicol  deferasirox  This list may not describe all possible interactions. Give your health care provider a list of all the medicines, herbs, non-prescription drugs, or dietary supplements you use. Also tell them if you smoke, drink alcohol, or use illegal drugs. Some items may interact with your medicine.  What should I watch for while using this medicine?  Visit your doctor or health care professional regularly. Tell your doctor if your symptoms do not start to get better or if they get worse. You may need blood work done while you are taking this medicine.  You may need to follow a special diet. Talk to your doctor. Foods that contain iron include: whole grains/cereals, dried fruits, beans, or peas, leafy green vegetables, and organ meats (liver, kidney).  What side effects may I notice from receiving this medicine?  Side effects that you should report to your doctor or health care professional as soon as possible:  allergic reactions like skin rash, itching or hives, swelling of the face, lips, or tonguebreathing problems  changes in blood pressure  feeling faint or lightheaded, falls  flushing, sweating, or hot  feelings  Side effects that usually do not require medical attention (Report these to your doctor or health care professional if they continue or are bothersome.):  changes in taste  constipation  dizziness  headache  nausea  pain, redness, or irritation at site where injected  vomiting  This list may not describe all possible side effects. Call your doctor for medical advice about side effects. You may report side effects to FDA at 6-863-KHT-9097.  Where should I keep my medicine?  This drug is given in a hospital or clinic and will not be stored at home.  NOTE: This sheet is a summary. It may not cover all possible information. If you have questions about this medicine, talk to your doctor, pharmacist, or health care provider.  NOTE:This sheet is a summary. It may not cover all possible information. If you have questions about this medicine, talk to your doctor, pharmacist, or health care provider. Copyright  2016 Gold Standard                  Follow-ups after your visit        Your next 10 appointments already scheduled     Nov 02, 2018 10:00 AM CDT   LAB with [facility] LAB GOLD WAITING   [facility]    [address] [facility] [address]              Please do not eat 10-12 hours before your appointment if you are coming in fasting for labs on lipids, cholesterol, or glucose (sugar). This does not apply to pregnant women. Water, hot tea and black coffee (with nothing added) are okay. Do not drink other fluids, diet soda or chew gum.            Nov 02, 2018 10:30 AM CDT   Procedure - 2.5 hour with U2A ROOM 2   Unit [address] [facility] ([facility])    [address] [facility] [address]               Nov 02, 2018 11:30 AM CDT   Heart Cath Right Heart Cath with [id number]   [facility]    [address] [facility] [address]    [phone]              Future tests that were ordered for you today     Open Future Orders        Priority Expected Expires Ordered    Heart Cath Right heart cath Routine  10/25/2019 10/25/2018            Who to contact     If you have questions or need follow up information about today's clinic visit or your schedule please contact [facility] directly at [phone].  Normal or non-critical lab and imaging results will be communicated to you by [employer], letter or phone within 4 business days after the clinic has received the results. If you do not hear from us within 7 days, please contact the clinic through [employer] or phone. If you have a critical or abnormal lab result, we will notify you by phone as soon as possible.  Submit refill requests through [employer] or call your pharmacy and they will forward the refill request to us. Please allow 3 business days for your refill to be completed.          Additional Information About Your Visit        [employer] Information     [employer] gives you secure access to your electronic health record. If you see a primary care provider, you can also send messages to your care team and make appointments. If you have questions, please call your primary care clinic.  If you do not have a primary care provider, please call [phone] and they will assist you.        Care EveryWhere ID     This is your Care EveryWhere ID. This could be used by other organizations to access your [facility] medical records  [id number]        Your Vitals Were     Pulse Temperature Respirations Pulse Oximetry          80 98.3  F (36.8  C) (Oral) 16 98%         Blood Pressure from Last 3 Encounters:   10/26/18 95/62   10/22/18 92/62   10/19/18 104/67    Weight from Last 3 Encounters:   10/11/18 72.5 kg (159 lb 12.8 oz)              Today, you had the following     No orders found for display       Primary Care Provider Office Phone # Fax #    Fredrick Wolff  [phone]       [address] [address]        Equal Access to Services     BELIA GOLDBERG : Hadii aad ku hadkt Morton, ignacio morton, machelle kakiana fuentes, dean topete. So [place] [phone].    ATENCIÓN: Si habla español, tiene a anaya disposición servicios gratuitos de asistencia lingüística. Llame al [phone].    We comply with applicable federal civil rights laws and [place] laws. We do not discriminate on the basis of race, color, national origin, age, disability, sex, sexual orientation, or gender identity.            Thank you!     Thank you for choosing [facility]  for your care. Our goal is always to provide you with excellent care. Hearing back from our patients is one way we can continue to improve our services. Please take a few minutes to complete the written survey that you may receive in the mail after your visit with us. Thank you!             Your Updated Medication List - Protect others around you: Learn how to safely use, store and throw away your medicines at www.disposemymeds.org.          This list is accurate as of 10/26/18  2:58 PM.  Always use your most recent med list.                   Brand Name Dispense Instructions for use Diagnosis    albuterol 108 (90 Base) MCG/ACT inhaler    PROAIR HFA/PROVENTIL HFA/VENTOLIN HFA     Inhale 2 puffs into the lungs every 6 hours as needed for shortness of breath / dyspnea or wheezing    Intermittent asthma without complication, unspecified asthma severity       amiodarone 200 MG tablet    PACERONE/CODARONE     Take 1 tablet (200 mg) by mouth daily    Paroxysmal atrial fibrillation (H)       ASACOL  MG EC tablet   Generic drug:  mesalamine      Take 3 tablets (2,400 mg) by mouth 2 times daily    Ulcerative colitis with complication, unspecified location (H)       azelastine 0.1 % nasal spray    ASTELIN      Spray 2 sprays into both nostrils 2 times daily    Chronic rhinitis       BENTYL 10 MG capsule   Generic drug:  dicyclomine      Take 1 capsule (10 mg) by mouth 3 times daily as needed (ulcerative colitis)    Ulcerative colitis with complication, unspecified location (H)       carvedilol 12.5 MG tablet    COREG     Take 1 tablet (12.5 mg) by mouth 2 times daily (with meals)    Chronic systolic heart failure (H)       CYMBALTA 30 MG EC capsule   Generic drug:  DULoxetine      Take 1 capsule (30 mg) by mouth daily    Depression, unspecified depression type       FLOMAX 0.4 MG capsule   Generic drug:  tamsulosin      Take 1 capsule (0.4 mg) by mouth daily (with dinner)    Benign prostatic hyperplasia, unspecified whether lower urinary tract symptoms present       furosemide 80 MG tablet    LASIX     Take one tab (80 mg) in am and 1/2 tab (40 mg) in pm    Chronic systolic heart failure (H)       levothyroxine 50 MCG tablet    SYNTHROID/LEVOTHROID     Take 1 tablet (50 mcg) by mouth daily    Hypothyroidism due to medication       MIRALAX Packet   Generic drug:  polyethylene glycol      Take 17 g by mouth 4 times daily    Constipation, unspecified constipation type       omeprazole 40 MG capsule    priLOSEC     Take 1 capsule (40 mg) by mouth daily In pm    Nausea       ondansetron 4 MG ODT tab    ZOFRAN-ODT     Take 1 tablet (4 mg) by mouth every 8 hours as needed for nausea    Nausea       potassium chloride SA 20 MEQ CR tablet    K-DUR/KLOR-CON M    180 tablet    Take 2 tablets (40 mEq) by mouth daily    Hypokalemia       Simethicone 125 MG Caps      Take 1 capsule by mouth 4 times daily    Nausea       SINGULAIR 10 MG tablet   Generic drug:  montelukast      Take 1 tablet (10 mg) by mouth At Bedtime    Intermittent asthma without complication, unspecified asthma severity       spironolactone 25 MG tablet    ALDACTONE     Take 1 tablet (25 mg) by mouth daily    Chronic systolic heart failure (H)       warfarin 5 MG  tablet    COUMADIN     Take 2.5 mg 2 dys/wk and 5 mg all others    Paroxysmal atrial fibrillation (H)

## 2018-10-26 NOTE — PROGRESS NOTES
Patient was called and right heart rescheduled. Patient is to hold Coumadin 4 days prior to procedure per Dr. Beck. Patient also scheduled to see device clinic to adjust ICD. Patient is agreeable to plan.

## 2018-10-29 ENCOUNTER — COMMUNICATION - HEALTHEAST (OUTPATIENT)
Dept: INTERNAL MEDICINE | Facility: CLINIC | Age: 53
End: 2018-10-29

## 2018-10-30 ENCOUNTER — COMMUNICATION - HEALTHEAST (OUTPATIENT)
Dept: INTERNAL MEDICINE | Facility: CLINIC | Age: 53
End: 2018-10-30

## 2018-10-30 ENCOUNTER — OFFICE VISIT - HEALTHEAST (OUTPATIENT)
Dept: INTERNAL MEDICINE | Facility: CLINIC | Age: 53
End: 2018-10-30

## 2018-10-30 DIAGNOSIS — E87.6 HYPOKALEMIA: ICD-10-CM

## 2018-10-30 DIAGNOSIS — K51.90 ULCERATIVE COLITIS WITHOUT COMPLICATIONS, UNSPECIFIED LOCATION (H): ICD-10-CM

## 2018-10-30 DIAGNOSIS — E03.9 HYPOTHYROIDISM, UNSPECIFIED TYPE: ICD-10-CM

## 2018-10-30 DIAGNOSIS — I48.91 ATRIAL FIBRILLATION, UNSPECIFIED TYPE (H): ICD-10-CM

## 2018-10-30 DIAGNOSIS — I42.8 NON-ISCHEMIC CARDIOMYOPATHY (H): ICD-10-CM

## 2018-10-30 DIAGNOSIS — I50.22 SYSTOLIC CHF, CHRONIC (H): ICD-10-CM

## 2018-10-30 DIAGNOSIS — J45.30 MILD PERSISTENT ASTHMA WITHOUT COMPLICATION: ICD-10-CM

## 2018-10-30 DIAGNOSIS — Z51.81 MEDICATION MONITORING ENCOUNTER: ICD-10-CM

## 2018-10-30 LAB
ALBUMIN SERPL-MCNC: 3.4 G/DL (ref 3.5–5)
ALP SERPL-CCNC: 162 U/L (ref 45–120)
ALT SERPL W P-5'-P-CCNC: 19 U/L (ref 0–45)
ANION GAP SERPL CALCULATED.3IONS-SCNC: 12 MMOL/L (ref 5–18)
AST SERPL W P-5'-P-CCNC: 23 U/L (ref 0–40)
BILIRUB SERPL-MCNC: 2 MG/DL (ref 0–1)
BUN SERPL-MCNC: 10 MG/DL (ref 8–22)
CALCIUM SERPL-MCNC: 9 MG/DL (ref 8.5–10.5)
CHLORIDE BLD-SCNC: 94 MMOL/L (ref 98–107)
CO2 SERPL-SCNC: 29 MMOL/L (ref 22–31)
CREAT SERPL-MCNC: 1.06 MG/DL (ref 0.7–1.3)
GFR SERPL CREATININE-BSD FRML MDRD: >60 ML/MIN/1.73M2
GLUCOSE BLD-MCNC: 63 MG/DL (ref 70–125)
POTASSIUM BLD-SCNC: 3.5 MMOL/L (ref 3.5–5)
PROT SERPL-MCNC: 6.3 G/DL (ref 6–8)
SODIUM SERPL-SCNC: 135 MMOL/L (ref 136–145)
TSH SERPL DL<=0.005 MIU/L-ACNC: 13.55 UIU/ML (ref 0.3–5)

## 2018-11-02 ENCOUNTER — APPOINTMENT (OUTPATIENT)
Dept: MEDSURG UNIT | Facility: CLINIC | Age: 53
End: 2018-11-02
Attending: INTERNAL MEDICINE
Payer: MEDICARE

## 2018-11-02 ENCOUNTER — APPOINTMENT (OUTPATIENT)
Dept: CARDIOLOGY | Facility: CLINIC | Age: 53
End: 2018-11-02
Attending: INTERNAL MEDICINE
Payer: MEDICARE

## 2018-11-02 ENCOUNTER — APPOINTMENT (OUTPATIENT)
Dept: LAB | Facility: CLINIC | Age: 53
End: 2018-11-02
Attending: INTERNAL MEDICINE
Payer: MEDICARE

## 2018-11-02 ENCOUNTER — HOSPITAL ENCOUNTER (OUTPATIENT)
Facility: CLINIC | Age: 53
Discharge: HOME | End: 2018-11-02
Attending: INTERNAL MEDICINE | Admitting: INTERNAL MEDICINE
Payer: MEDICARE

## 2018-11-02 VITALS
SYSTOLIC BLOOD PRESSURE: 91 MMHG | HEIGHT: 68 IN | WEIGHT: 145 LBS | RESPIRATION RATE: 18 BRPM | HEART RATE: 35 BPM | OXYGEN SATURATION: 96 % | TEMPERATURE: 97.8 F | DIASTOLIC BLOOD PRESSURE: 60 MMHG | BODY MASS INDEX: 21.98 KG/M2

## 2018-11-02 DIAGNOSIS — I42.9 CARDIOMYOPATHY, UNSPECIFIED TYPE (H): ICD-10-CM

## 2018-11-02 DIAGNOSIS — I51.89 OTHER ILL-DEFINED HEART DISEASES: ICD-10-CM

## 2018-11-02 LAB
ALBUMIN SERPL-MCNC: 3.2 G/DL (ref 3.4–5)
ALP SERPL-CCNC: 180 U/L (ref 40–150)
ALT SERPL W P-5'-P-CCNC: 27 U/L (ref 0–70)
ANION GAP SERPL CALCULATED.3IONS-SCNC: 8 MMOL/L (ref 3–14)
AST SERPL W P-5'-P-CCNC: 24 U/L (ref 0–45)
BASOPHILS # BLD AUTO: 0.1 10E9/L (ref 0–0.2)
BASOPHILS NFR BLD AUTO: 0.8 %
BILIRUB SERPL-MCNC: 1.9 MG/DL (ref 0.2–1.3)
BUN SERPL-MCNC: 14 MG/DL (ref 7–30)
CALCIUM SERPL-MCNC: 8.5 MG/DL (ref 8.5–10.1)
CHLORIDE SERPL-SCNC: 98 MMOL/L (ref 94–109)
CO2 SERPL-SCNC: 27 MMOL/L (ref 20–32)
CREAT SERPL-MCNC: 1.04 MG/DL (ref 0.66–1.25)
DIFFERENTIAL METHOD BLD: ABNORMAL
EOSINOPHIL # BLD AUTO: 0.1 10E9/L (ref 0–0.7)
EOSINOPHIL NFR BLD AUTO: 1.9 %
ERYTHROCYTE [DISTWIDTH] IN BLOOD BY AUTOMATED COUNT: 20 % (ref 10–15)
GFR SERPL CREATININE-BSD FRML MDRD: 74 ML/MIN/1.7M2
GLUCOSE SERPL-MCNC: 74 MG/DL (ref 70–99)
HCT VFR BLD AUTO: 44.2 % (ref 40–53)
HGB BLD-MCNC: 13.7 G/DL (ref 13.3–17.7)
IMM GRANULOCYTES # BLD: 0 10E9/L (ref 0–0.4)
IMM GRANULOCYTES NFR BLD: 0.3 %
INR PPP: 1.34 (ref 0.86–1.14)
LYMPHOCYTES # BLD AUTO: 1.3 10E9/L (ref 0.8–5.3)
LYMPHOCYTES NFR BLD AUTO: 22.2 %
MCH RBC QN AUTO: 27.7 PG (ref 26.5–33)
MCHC RBC AUTO-ENTMCNC: 31 G/DL (ref 31.5–36.5)
MCV RBC AUTO: 89 FL (ref 78–100)
MONOCYTES # BLD AUTO: 0.5 10E9/L (ref 0–1.3)
MONOCYTES NFR BLD AUTO: 7.8 %
NEUTROPHILS # BLD AUTO: 4 10E9/L (ref 1.6–8.3)
NEUTROPHILS NFR BLD AUTO: 67 %
NRBC # BLD AUTO: 0 10*3/UL
NRBC BLD AUTO-RTO: 0 /100
PLATELET # BLD AUTO: 158 10E9/L (ref 150–450)
POTASSIUM SERPL-SCNC: 3.7 MMOL/L (ref 3.4–5.3)
PROT SERPL-MCNC: 7.3 G/DL (ref 6.8–8.8)
RBC # BLD AUTO: 4.95 10E12/L (ref 4.4–5.9)
SODIUM SERPL-SCNC: 133 MMOL/L (ref 133–144)
WBC # BLD AUTO: 5.9 10E9/L (ref 4–11)

## 2018-11-02 PROCEDURE — 25000125 ZZHC RX 250: Performed by: INTERNAL MEDICINE

## 2018-11-02 PROCEDURE — 85610 PROTHROMBIN TIME: CPT | Performed by: INTERNAL MEDICINE

## 2018-11-02 PROCEDURE — 40000166 ZZH STATISTIC PP CARE STAGE 1

## 2018-11-02 PROCEDURE — 80053 COMPREHEN METABOLIC PANEL: CPT | Performed by: INTERNAL MEDICINE

## 2018-11-02 PROCEDURE — 85025 COMPLETE CBC W/AUTO DIFF WBC: CPT | Performed by: INTERNAL MEDICINE

## 2018-11-02 PROCEDURE — 36415 COLL VENOUS BLD VENIPUNCTURE: CPT | Performed by: INTERNAL MEDICINE

## 2018-11-02 PROCEDURE — 93451 RIGHT HEART CATH: CPT | Mod: 26 | Performed by: INTERNAL MEDICINE

## 2018-11-02 PROCEDURE — 27211181 ZZH BALLOON TIP PRESSURE CR5

## 2018-11-02 PROCEDURE — 27210787 ZZH MANIFOLD CR2

## 2018-11-02 PROCEDURE — 27210982 ZZH KIT RT HC TOTES DISP CR7

## 2018-11-02 PROCEDURE — 93451 RIGHT HEART CATH: CPT

## 2018-11-02 RX ORDER — LIDOCAINE 40 MG/G
CREAM TOPICAL
Status: COMPLETED | OUTPATIENT
Start: 2018-11-02 | End: 2018-11-02

## 2018-11-02 RX ADMIN — LIDOCAINE: 40 CREAM TOPICAL at 10:58

## 2018-11-02 NOTE — PROGRESS NOTES
Pt has returned to unit 2a s/p RHC. Right neck site CDI, soft, flat. Discharge instructions reviewed with pt, pt verbalizes understanding. Offered PO. Pt given water and crackers per request.  1228 Pt declined wheelchair and ambulated off unit with steady gait.

## 2018-11-02 NOTE — IP AVS SNAPSHOT
MRN:[id number]                      After Visit Summary   11/2/2018    Everton Larios    MRN: [id number]           Visit Information        Department      11/2/2018  9:43 AM [address] [facility]          Review of your medicines      UNREVIEWED medicines. Ask your doctor about these medicines        Dose / Directions    albuterol 108 (90 Base) MCG/ACT inhaler   Commonly known as:  PROAIR HFA/PROVENTIL HFA/VENTOLIN HFA   Used for:  Intermittent asthma without complication, unspecified asthma severity        Dose:  2 puff   Inhale 2 puffs into the lungs every 6 hours as needed for shortness of breath / dyspnea or wheezing   Refills:  0       amiodarone 200 MG tablet   Commonly known as:  PACERONE/CODARONE   Used for:  Paroxysmal atrial fibrillation (H)        Dose:  200 mg   Take 1 tablet (200 mg) by mouth daily   Refills:  0       ASACOL  MG EC tablet   Used for:  Ulcerative colitis with complication, unspecified location (H)   Generic drug:  mesalamine        Dose:  2400 mg   Take 3 tablets (2,400 mg) by mouth 2 times daily   Refills:  0       azelastine 0.1 % nasal spray   Commonly known as:  ASTELIN   Used for:  Chronic rhinitis        Dose:  2 spray   Spray 2 sprays into both nostrils 2 times daily   Refills:  0       BENTYL 10 MG capsule   Used for:  Ulcerative colitis with complication, unspecified location (H)   Generic drug:  dicyclomine        Dose:  10 mg   Take 1 capsule (10 mg) by mouth 3 times daily as needed (ulcerative colitis)   Refills:  0       carvedilol 12.5 MG tablet   Commonly known as:  COREG   Used for:  Chronic systolic heart failure (H)        Dose:  12.5 mg   Take 1 tablet (12.5 mg) by mouth 2 times daily (with meals)   Refills:  0       CYMBALTA 30 MG EC capsule   Used for:  Depression, unspecified depression type   Generic drug:  DULoxetine        Dose:  30 mg   Take 1 capsule (30 mg) by mouth daily   Refills:  0       FLOMAX 0.4 MG capsule   Used for:   Benign prostatic hyperplasia, unspecified whether lower urinary tract symptoms present   Generic drug:  tamsulosin        Dose:  0.4 mg   Take 1 capsule (0.4 mg) by mouth daily (with dinner)   Refills:  0       furosemide 80 MG tablet   Commonly known as:  LASIX   Used for:  Chronic systolic heart failure (H)        Take one tab (80 mg) in am and 1/2 tab (40 mg) in pm   Refills:  0       levothyroxine 50 MCG tablet   Commonly known as:  SYNTHROID/LEVOTHROID   Used for:  Hypothyroidism due to medication        Dose:  50 mcg   Take 1 tablet (50 mcg) by mouth daily   Refills:  0       MIRALAX Packet   Used for:  Constipation, unspecified constipation type   Generic drug:  polyethylene glycol        Dose:  17 g   Take 17 g by mouth 4 times daily   Refills:  0       omeprazole 40 MG capsule   Commonly known as:  priLOSEC   Used for:  Nausea        Dose:  40 mg   Take 1 capsule (40 mg) by mouth daily In pm   Refills:  0       ondansetron 4 MG ODT tab   Commonly known as:  ZOFRAN-ODT   Used for:  Nausea        Dose:  4 mg   Take 1 tablet (4 mg) by mouth every 8 hours as needed for nausea   Refills:  0       potassium chloride SA 20 MEQ CR tablet   Commonly known as:  K-DUR/KLOR-CON M   Used for:  Hypokalemia        Dose:  40 mEq   Take 2 tablets (40 mEq) by mouth daily   Quantity:  180 tablet   Refills:  3       Simethicone 125 MG Caps   Used for:  Nausea        Dose:  1 capsule   Take 1 capsule by mouth 4 times daily   Refills:  0       SINGULAIR 10 MG tablet   Used for:  Intermittent asthma without complication, unspecified asthma severity   Generic drug:  montelukast        Dose:  10 mg   Take 1 tablet (10 mg) by mouth At Bedtime   Refills:  0       spironolactone 25 MG tablet   Commonly known as:  ALDACTONE   Used for:  Chronic systolic heart failure (H)        Dose:  25 mg   Take 1 tablet (25 mg) by mouth daily   Refills:  0       warfarin 5 MG tablet   Commonly known as:  COUMADIN   Used for:  Paroxysmal atrial  fibrillation (H)        Take 2.5 mg 2 dys/wk and 5 mg all others   Refills:  0                Protect others around you: Learn how to safely use, store and throw away your medicines at www.disposemymeds.org.         Follow-ups after your visit        Your next 10 appointments already scheduled     Nov 02, 2018 11:30 AM CDT   Heart Cath Right Heart Cath with [id number]   [facility], [place],  Heart Cath Lab ([facility], [facility])    [address] [address]   [phone]               Care Instructions        Further instructions from your care team       [facility]                        Interventional Cardiology  Discharge Instructions   Post Right Heart Cath      AFTER YOU GO HOME:    DO drink plenty of fluids    DO resume your regular diet and medications unless otherwise instructed by your Primary Physician    Do Not scrub the procedure site vigorously    No lotion or powder to the puncture site for 3 days    CALL YOUR PRIMARY PHYSICIAN IF: You may resume all normal activity.  Monitor neck site for bleeding, swelling, or voice changes. If you notice bleeding or swelling immediately apply pressure to the site and call number below to speak with Cardiology Fellow.  If you experience any changes in your breathing you should call your doctor immediately or come to the closest Emergency Department.  Do not drive yourself.    ADDITIONAL INSTRUCTIONS: Medications: You are to resume all home medications including anticoagulation therapy unless otherwise advised by your primary cardiologist or nurse coordinator.    Follow Up: Per your primary cardiology team    If you have any questions or concerns regarding your procedure site please call [phone] at anytime and ask for Cardiology Fellow on call.  They are available 24 hours a day.  You may also contact the Cardiology Clinic after hours number at [phone].                                                        Telephone Numbers [phone] Monday-Friday 8:00 am to 4:30 pm    [phone] After 4:30 pm Monday-Friday, Weekends & Holidays  Ask for Interventional Cardiologist on call. Someone is on call 24 hours/day   [facility] toll free number [phone] Monday-Friday 8:00 am to 4:30 pm   [facility] Emergency Dept [phone]                    Additional Information About Your Visit        "[employer]hart Information     [employer] gives you secure access to your electronic health record. If you see a primary care provider, you can also send messages to your care team and make appointments. If you have questions, please call your primary care clinic.  If you do not have a primary care provider, please call [phone] and they will assist you.        Care EveryWhere ID     This is your Care EveryWhere ID. This could be used by other organizations to access your [facility] medical records  [id number]        Your Vitals Were     Blood Pressure Pulse Temperature Respirations Pulse Oximetry       105/50 (BP Location: Right arm) 35 97.8  F (36.6  C) (Oral) 24 97%        Primary Care Provider Office Phone # Fax #    [facility] [phone]      Equal Access to Services     BELIA GOLDBERG AH: Hadii aad ku hadasho Soomaali, waaxda luqadaha, qaybta kaalmada adeegyada, waxay idiin haysara topete. So [facility] [phone].    ATENCIÓN: Si habla español, tiene a anaya disposición servicios grateknos de asistencia lingüística. Llame al [phone].    We comply with applicable federal civil rights laws and [place] laws. We do not discriminate on the basis of race, color, national origin, age, disability, sex, sexual orientation, or gender identity.            Thank you!     Thank you for choosing [facility] for your care. Our goal is always to provide you with excellent care. Hearing back from our patients is one way we can continue to improve our services. Please take a few minutes to complete the written  survey that you may receive in the mail after you visit with us. Thank you!             Medication List: This is a list of all your medications and when to take them. Check marks below indicate your daily home schedule. Keep this list as a reference.      Medications           Morning Afternoon Evening Bedtime As Needed    albuterol 108 (90 Base) MCG/ACT inhaler   Commonly known as:  PROAIR HFA/PROVENTIL HFA/VENTOLIN HFA   Inhale 2 puffs into the lungs every 6 hours as needed for shortness of breath / dyspnea or wheezing                                amiodarone 200 MG tablet   Commonly known as:  PACERONE/CODARONE   Take 1 tablet (200 mg) by mouth daily                                ASACOL  MG EC tablet   Take 3 tablets (2,400 mg) by mouth 2 times daily   Generic drug:  mesalamine                                azelastine 0.1 % nasal spray   Commonly known as:  ASTELIN   Spray 2 sprays into both nostrils 2 times daily                                BENTYL 10 MG capsule   Take 1 capsule (10 mg) by mouth 3 times daily as needed (ulcerative colitis)   Generic drug:  dicyclomine                                carvedilol 12.5 MG tablet   Commonly known as:  COREG   Take 1 tablet (12.5 mg) by mouth 2 times daily (with meals)                                CYMBALTA 30 MG EC capsule   Take 1 capsule (30 mg) by mouth daily   Generic drug:  DULoxetine                                FLOMAX 0.4 MG capsule   Take 1 capsule (0.4 mg) by mouth daily (with dinner)   Generic drug:  tamsulosin                                furosemide 80 MG tablet   Commonly known as:  LASIX   Take one tab (80 mg) in am and 1/2 tab (40 mg) in pm                                levothyroxine 50 MCG tablet   Commonly known as:  SYNTHROID/LEVOTHROID   Take 1 tablet (50 mcg) by mouth daily                                MIRALAX Packet   Take 17 g by mouth 4 times daily   Generic drug:  polyethylene glycol                                omeprazole  40 MG capsule   Commonly known as:  priLOSEC   Take 1 capsule (40 mg) by mouth daily In pm                                ondansetron 4 MG ODT tab   Commonly known as:  ZOFRAN-ODT   Take 1 tablet (4 mg) by mouth every 8 hours as needed for nausea                                potassium chloride SA 20 MEQ CR tablet   Commonly known as:  K-DUR/KLOR-CON M   Take 2 tablets (40 mEq) by mouth daily                                Simethicone 125 MG Caps   Take 1 capsule by mouth 4 times daily                                SINGULAIR 10 MG tablet   Take 1 tablet (10 mg) by mouth At Bedtime   Generic drug:  montelukast                                spironolactone 25 MG tablet   Commonly known as:  ALDACTONE   Take 1 tablet (25 mg) by mouth daily                                warfarin 5 MG tablet   Commonly known as:  COUMADIN   Take 2.5 mg 2 dys/wk and 5 mg all others

## 2018-11-02 NOTE — PROGRESS NOTES
Pt arrives to  for RHC. Per pt his parents are waiting in the [facility] waiting room.   Consent needs to be signed.   Pulse ox HR reading 36-56, pt asymptomatic and has hx of afib and ICD. Pt placed on cardiac monitor, showing paced in 60s, intermittent p waves, PVCs present. This does not appear new, see device RN note from 10/15/18. Dr Posada, CCL RN notified.

## 2018-11-02 NOTE — PROCEDURES
CARDIAC CATH REPORT:   PROCEDURES PERFORMED:   Right Heart Catheterization    PHYSICIANS:  Attending Physician: Santino Mckeon MD  Interventional Cardiology Fellow: None  Cardiology Fellow: Santino Haque MD    INDICATION:  Everton Larios is a 53 year old male with NICM EF 20% who presents for a right heart catheterization for hemodynamic assessment at request of Dr. Rosendo Beck.    DESCRIPTION:  1. Consent obtained with discussion of risks.  All questions were answered.  2. Sterile prep and procedure.  3. Location with Sheaths:   Rt IJ  7 Fr 10 cm [short]  4. Access: Local anesthetic with lidocaine.  A standard 18 guage needle with ultrasound guidance was used to establish vascular access using a modified Seldinger technique.  5. Diagnostic Catheters:   7 Fr  [place] Liz  6. Estimated blood loss: < 5 ml    MEDICATIONS:    Heart rate, BP, respiration, oxygen saturation and patient responses were monitored throughout the procedure with the assistance of the RN under my supervision.    Procedures:    HEMODYNAMICS:  BSA 1.8 m2  1. HR 64 bpm  2. /25/75 mmHg  3. RA 15/15/14   4. RV 48/14  5. PA 48/25/33   6. PCW 23/32/22   7. PA sat 57%   8. PCW sat not obtained  9. Hgb 12 g/dL   10. Jaden CO 3.0   11. Jaden CI 1.7   12. TD CO 3.8   13. TD CI 2.1   14. PVR 2.7     Sheath Removal:  The RIJ sheath was manually removed in the cardiac catheterization laboratory.    Contrast: Isovue, 0 ml     Fluoroscopy Time: 1.2 min    COMPLICATIONS:  1. 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    PLAN:   >> Discharge today per protocol  >> Follow up with Dr. Beck for ongoing medical management.    The attending interventional cardiologist was present and supervised all critical aspects the procedure.    Findings discussed with  patient and Dr. Beck at end of case.    See CVIS report for final draft.    Santino Haque MD   Cardiology Fellow    Santino Mckeon MD  Cardiology Staff        ATTENDING ATTESTATION: I was present and supervised the cardiology fellow throughout the procedure and reviewed the findings with the fellow at the completion of the procedure.  I agree with the documentation above.    Santino Mckeon MD, PhD  Interventional/Critical Care Cardiology  [phone]    November 2, 2018

## 2018-11-02 NOTE — IP AVS SNAPSHOT
[address] [address]                                       After Visit Summary   11/2/2018    Everton Larios    MRN: [id number]           After Visit Summary Signature Page     I have received my discharge instructions, and my questions have been answered. I have discussed any challenges I see with this plan with the nurse or doctor.    ..........................................................................................................................................  Patient/Patient Representative Signature      ..........................................................................................................................................  Patient Representative Print Name and Relationship to Patient    ..................................................               ................................................  Date                                   Time    ..........................................................................................................................................  Reviewed by Signature/Title    ...................................................              ..............................................  Date                                               Time          22EPIC Rev 08/18

## 2018-11-07 ENCOUNTER — COMMUNICATION - HEALTHEAST (OUTPATIENT)
Dept: INTERNAL MEDICINE | Facility: CLINIC | Age: 53
End: 2018-11-07

## 2018-11-07 ENCOUNTER — OFFICE VISIT - HEALTHEAST (OUTPATIENT)
Dept: CARDIOLOGY | Facility: CLINIC | Age: 53
End: 2018-11-07

## 2018-11-07 DIAGNOSIS — I42.8 NON-ISCHEMIC CARDIOMYOPATHY (H): ICD-10-CM

## 2018-11-07 DIAGNOSIS — I48.91 ATRIAL FIBRILLATION, UNSPECIFIED TYPE (H): ICD-10-CM

## 2018-11-07 ASSESSMENT — MIFFLIN-ST. JEOR: SCORE: 1453.35

## 2018-11-08 ENCOUNTER — CARE COORDINATION (OUTPATIENT)
Dept: CARDIOLOGY | Facility: CLINIC | Age: 53
End: 2018-11-08

## 2018-11-08 ENCOUNTER — TELEPHONE (OUTPATIENT)
Dept: CARDIOLOGY | Facility: CLINIC | Age: 53
End: 2018-11-08

## 2018-11-08 ENCOUNTER — VIRTUAL VISIT (OUTPATIENT)
Dept: CARDIOLOGY | Facility: CLINIC | Age: 53
End: 2018-11-08
Payer: COMMERCIAL

## 2018-11-08 DIAGNOSIS — I42.8 NONISCHEMIC CARDIOMYOPATHY (H): Primary | ICD-10-CM

## 2018-11-08 PROCEDURE — 99214 OFFICE O/P EST MOD 30 MIN: CPT | Performed by: INTERNAL MEDICINE

## 2018-11-08 NOTE — PROGRESS NOTES
Patient called with results of right heart cath. Report is being reviewed by provider. Will call patient back with any recommendations. Patient is agreeable to plan.

## 2018-11-08 NOTE — MR AVS SNAPSHOT
After Visit Summary   11/8/2018    Everton Larios    MRN: [id number]           Patient Information     Date Of Birth          1965        Visit Information        Provider Department      11/8/2018 5:41 PM Rosendo Beck MD [facility]        Today's Diagnoses     Nonischemic cardiomyopathy (H)    -  1       Follow-ups after your visit        Your next 10 appointments already scheduled     Nov 15, 2018  9:30 AM CST   Lab with  LAB    [facility] ([facility])    [address]  1st Floor  [facility] [address]   [phone]            Nov 15, 2018 10:00 AM CST   Six Minute Walk with  PFL 6 MINUTE WALK 2   [facility] Pulmonary Function Testing ([facility])    [address]  3rd Floor  [place] [facility] [id number] [phone]            Nov 15, 2018 11:00 AM CST   Nurse Visit with  Cvc Nurse   [facility] ([facility])    [address]  [address]  [place] [facility] [id number] [phone]            Nov 15, 2018 12:00 PM CST   (Arrive by 11:45 AM)   CORE NEW with Dawna Sutton NP   [facility] ([facility])    [address]  [address]  [place] [facility] [id number] [phone]            Nov 15, 2018  1:30 PM CST   NUTRITION VISIT with Mali Erazo RD   [facility] Solid Organ Transplant ([facility])    [address]  [address]  [facility] [id number] [phone]            Nov 15, 2018  2:00 PM CST   US LOWER EXTREMITY ARTERIAL DUPLEX BILATERAL with UCUSV1   [facility] US ([facility])    [address]  1st Floor  [facility] [id number] [phone]           How do I prepare for my exam? (Food and drink instructions) No Food and Drink Restrictions.  How do I prepare for my exam? (Other instructions) You do not need to do  anything special to prepare for your exam.  What should I wear: Wear comfortable clothes.  How long does the exam take: Most ultrasounds take 30 to 60 minutes.  What should I bring: Bring a list of your medicines, including vitamins, minerals and over-the-counter drugs. It is safest to leave personal items at home.  Do I need a :  No  is needed.  What do I need to tell my doctor: Tell your doctor about any allergies you may have.  What should I do after the exam: No restrictions, You may resume normal activities.  What is this test: An ultrasound uses sound waves to make pictures of the body. Sound waves do not cause pain. The only discomfort may be the pressure of the wand against your skin or full bladder.  Who should I call with questions: If you have any questions, please call the Imaging Department where you will have your exam. Directions, parking instructions, and other information is available on our website, [email or web address].            Nov 15, 2018  3:00 PM CST   US CAROTID BILATERAL with UCUSV1   [facility] US ([facility])    [address] [place] [address] [phone]           How do I prepare for my exam? (Food and drink instructions) No Food and Drink Restrictions.  How do I prepare for my exam? (Other instructions) You do not need to do anything special to prepare for your exam.  What should I wear: Wear comfortable clothes.  How long does the exam take: Most ultrasounds take 30 to 60 minutes.  What should I bring: Bring a list of your medicines, including vitamins, minerals and over-the-counter drugs. It is safest to leave personal items at home.  Do I need a :  No  is needed.  What do I need to tell my doctor: Tell your doctor about any allergies you may have.  What should I do after the exam: No restrictions, You may resume normal activities.  What is this test: An ultrasound uses sound waves to make  pictures of the body. Sound waves do not cause pain. The only discomfort may be the pressure of the wand against your skin or full bladder.  Who should I call with questions: If you have any questions, please call the Imaging Department where you will have your exam. Directions, parking instructions, and other information is available on our website, [email or web address].            Nov 15, 2018  4:00 PM CST   US BRIAN DOPPLER NO EXERCISE 1-2 LVLS BILAT with UCUSV1   [facility] US ([facility])    [address] [place] [address] [phone]           How do I prepare for my exam? (Food and drink instructions) No caffeine or tobacco for 1 hour prior to exam.  What should I wear: Wear comfortable clothes.  How long does the exam take: Most ultrasounds take 30 to 60 minutes.  What should I bring: Bring a list of your medicines, including vitamins, minerals and over-the-counter drugs. It is safest to leave personal items at home.  Do I need a :  No  is needed.  What do I need to tell my doctor: Tell your doctor about any allergies you may have.  What should I do after the exam: No restrictions, You may resume normal activities.  What is this test: An ultrasound uses sound waves to make pictures of the body. Sound waves do not cause pain. The only discomfort may be the pressure of the wand against your skin or full bladder.  Who should I call with questions: If you have any questions, please call the Imaging Department where you will have your exam. Directions, parking instructions, and other information is available on our website, [email or web address].            Nov 15, 2018  4:30 PM CST   FULL PULMONARY FUNCTION with UC PFL D   [facility] Pulmonary Function Testing ([facility])    [address] [id number] [phone]            Nov 19, 2018  4:00 PM CST   (Arrive by 3:45 PM)   New  Patient Visit with Ben White MD   [facility] ([facility])    [address]  [address] [place] [address] [phone]              Future tests that were ordered for you today     Open Future Orders        Priority Expected Expires Ordered    General PFT Lab (Please always keep checked) Routine 11/14/2018 11/28/2018 11/12/2018    CT Abdomen wo & w Chest Pelvis w Contrast Routine 11/14/2018 11/28/2018 11/12/2018    Basic metabolic panel Routine 11/13/2018 11/12/2019 11/12/2018    N terminal pro BNP outpatient Routine 11/13/2018 11/12/2019 11/12/2018            Who to contact     If you have questions or need follow up information about today's clinic visit or your schedule please contact [facility] directly at [phone].  Normal or non-critical lab and imaging results will be communicated to you by MyChart, letter or phone within 4 business days after the clinic has received the results. If you do not hear from us within 7 days, please contact the clinic through [employer] or phone. If you have a critical or abnormal lab result, we will notify you by phone as soon as possible.  Submit refill requests through [employer] or call your pharmacy and they will forward the refill request to us. Please allow 3 business days for your refill to be completed.          Additional Information About Your Visit        MyChart Information     [employer] gives you secure access to your electronic health record. If you see a primary care provider, you can also send messages to your care team and make appointments. If you have questions, please call your primary care clinic.  If you do not have a primary care provider, please call [phone] and they will assist you.      [employer] is an electronic gateway that provides easy, online access to your medical records. With [employer], you can request a clinic appointment, read your test results, renew a prescription or  communicate with your care team.     To access your existing account, please contact your [facility] or call [phone] for assistance.        Care EveryWhere ID     This is your Care EveryWhere ID. This could be used by other organizations to access your [facility] medical records  [id number]         Blood Pressure from Last 3 Encounters:   11/02/18 91/60   10/26/18 92/61   10/22/18 92/62    Weight from Last 3 Encounters:   11/02/18 65.8 kg (145 lb)   10/11/18 72.5 kg (159 lb 12.8 oz)              Today, you had the following     No orders found for display       Primary Care Provider Office Phone # Fax #    Fredrick J New [phone]       [address] [address]        Equal Access to Services     BELIA GOLDBERG : Hadii aad ku hadasho Soomaali, waaxda luqadaha, qaybta kaalmada adeegyada, dean rayo . So [facility] [phone].    ATENCIÓN: Si habla español, tiene a anaya disposición servicios gratuitos de asistencia lingüística. Jad al [phone].    We comply with applicable federal civil rights laws and [place] laws. We do not discriminate on the basis of race, color, national origin, age, disability, sex, sexual orientation, or gender identity.            Thank you!     Thank you for choosing [facility]  for your care. Our goal is always to provide you with excellent care. Hearing back from our patients is one way we can continue to improve our services. Please take a few minutes to complete the written survey that you may receive in the mail after your visit with us. Thank you!             Your Updated Medication List - Protect others around you: Learn how to safely use, store and throw away your medicines at www.disposemymeds.org.          This list is accurate as of 11/8/18 11:59 PM.  Always use your most recent med list.                   Brand Name Dispense Instructions for use  Diagnosis    albuterol 108 (90 Base) MCG/ACT inhaler    PROAIR HFA/PROVENTIL HFA/VENTOLIN HFA     Inhale 2 puffs into the lungs every 6 hours as needed for shortness of breath / dyspnea or wheezing    Intermittent asthma without complication, unspecified asthma severity       amiodarone 200 MG tablet    PACERONE/CODARONE     Take 1 tablet (200 mg) by mouth daily    Paroxysmal atrial fibrillation (H)       ASACOL  MG EC tablet   Generic drug:  mesalamine      Take 3 tablets (2,400 mg) by mouth 2 times daily    Ulcerative colitis with complication, unspecified location (H)       azelastine 0.1 % nasal spray    ASTELIN     Spray 2 sprays into both nostrils 2 times daily    Chronic rhinitis       carvedilol 12.5 MG tablet    COREG     Take 1 tablet (12.5 mg) by mouth 2 times daily (with meals)    Chronic systolic heart failure (H)       CYMBALTA 30 MG EC capsule   Generic drug:  DULoxetine      Take 1 capsule (30 mg) by mouth daily    Depression, unspecified depression type       FLOMAX 0.4 MG capsule   Generic drug:  tamsulosin      Take 1 capsule (0.4 mg) by mouth daily (with dinner)    Benign prostatic hyperplasia, unspecified whether lower urinary tract symptoms present       furosemide 80 MG tablet    LASIX     Take one tab (80 mg) in am and 1/2 tab (40 mg) in pm    Chronic systolic heart failure (H)       levothyroxine 50 MCG tablet    SYNTHROID/LEVOTHROID     Take 1 tablet (50 mcg) by mouth daily    Hypothyroidism due to medication       MIRALAX Packet   Generic drug:  polyethylene glycol      Take 17 g by mouth 4 times daily    Constipation, unspecified constipation type       omeprazole 40 MG capsule    priLOSEC     Take 1 capsule (40 mg) by mouth daily In pm    Nausea       ondansetron 4 MG ODT tab    ZOFRAN-ODT     Take 1 tablet (4 mg) by mouth every 8 hours as needed for nausea    Nausea       potassium chloride SA 20 MEQ CR tablet    K-DUR/KLOR-CON M    180 tablet    Take 2 tablets (40 mEq) by mouth  daily    Hypokalemia       Simethicone 125 MG Caps      Take 1 capsule by mouth 4 times daily    Nausea       SINGULAIR 10 MG tablet   Generic drug:  montelukast      Take 1 tablet (10 mg) by mouth At Bedtime    Intermittent asthma without complication, unspecified asthma severity       spironolactone 25 MG tablet    ALDACTONE     Take 1 tablet (25 mg) by mouth daily    Chronic systolic heart failure (H)       warfarin 5 MG tablet    COUMADIN     Take 2.5 mg 2 dys/wk and 5 mg all others    Paroxysmal atrial fibrillation (H)

## 2018-11-08 NOTE — TELEPHONE ENCOUNTER
Patient called and is wondering if there is any updates regarding his RHC done last Friday 11/2/2018. He is getting anxious especially since he reports that he is not feeling well. Would like at least a call today from care team to discuss Sx and results and further plans regarding his care.   Christopher Dutton, [facility] Heart Failure Admin/Referrals

## 2018-11-08 NOTE — PROGRESS NOTES
Impression   1. Cardiomyopathy  2. Chronic congestive heart failure  3. Chronic GI disturbance JONATHAN vs. Irritable bowel  4. Hypothyroidism on replacement  5. Atrial fibrillation  6. Asthma  7. GERD with Pierce's  8. Remote history of Gi bleeding     Bhumika Vera, CNP    [address] [place] [address]    [phone] (Fax)       Gunnar Ro MD    [address] [place] [address]    [phone]      Fredrick Wolff M.D.  [facility]     rFederick Akers M.D.  [facility]     Paddy Velarde M.D.  [facility]     Dear Doctors:    I had the opportunity to review the results of our investigation of your patient, Everton Larios, who was see for evaluation of advanced heart failure, despite careful and regular follow-up and  Appropriate medications.      Cardiopulmonary exercise test demonstrated a falling blood pressure and low peak VO@12ml/kg/minute.      Right heart catherization disclosed:    1. HR 64 bpm  2. /25/75 mmHg  3. RA 15/15/14   4. RV 48/14  5. PA 48/25/33   6. PCW 23/32/22   7. PA sat 57%   8. PCW sat not obtained  9. Hgb 12 g/dL   10. Jaden CO 3.0   11. Jaden CI 1.7   12. TD CO 3.8   13. TD CI 2.1   14. PVR 2.7     He was found to be iron deficient and received two infusions of parenteral iron.    Imaging demonstrates LVEF  16%    Given his markedly reduced exercise capacity with decreasing blood pressure with exertion and blood pressure limitations to much more medications, he would be a candidate to be evaluated for LVAD or transplantation.    I discussed this with him today and he wishes to proceed.  Barriers that need to be evaluated further include:    1. He lives alone and would need additional support for LVAD.  2. His GI maladies need to be clarified for us by Piyush Velarde and Oswald. He will need to be warfarin tolerant for an LVAD and there is a question of chronic ulcerative colitis.  3. Our attempts to increase  heart rate and therefore cardiac output did not result in him feeling any better and the device was reprogrammed to his original rate in the low 60s    We will proceed with LVAD/transplant evaluation early next week.      Rosendo GELLER;physicians above

## 2018-11-09 ENCOUNTER — DOCUMENTATION ONLY (OUTPATIENT)
Dept: CARDIOLOGY | Facility: CLINIC | Age: 53
End: 2018-11-09

## 2018-11-09 DIAGNOSIS — I70.708: ICD-10-CM

## 2018-11-09 DIAGNOSIS — K59.09 OTHER CONSTIPATION: ICD-10-CM

## 2018-11-09 DIAGNOSIS — F10.20 UNCOMPLICATED ALCOHOL DEPENDENCE (H): ICD-10-CM

## 2018-11-09 DIAGNOSIS — I63.81 OTHER CEREBRAL INFARCTION DUE TO OCCLUSION OR STENOSIS OF SMALL ARTERY (H): ICD-10-CM

## 2018-11-09 DIAGNOSIS — Z12.5 SCREENING PSA (PROSTATE SPECIFIC ANTIGEN): ICD-10-CM

## 2018-11-09 DIAGNOSIS — I79.8 OTHER DISORDERS OF ARTERIES, ARTERIOLES AND CAPILLARIES IN DISEASES CLASSIFIED ELSEWHERE (H): ICD-10-CM

## 2018-11-09 DIAGNOSIS — R79.9 ABNORMAL FINDING OF BLOOD CHEMISTRY: ICD-10-CM

## 2018-11-09 DIAGNOSIS — I50.22 CHRONIC SYSTOLIC CONGESTIVE HEART FAILURE (H): ICD-10-CM

## 2018-11-12 ENCOUNTER — DOCUMENTATION ONLY (OUTPATIENT)
Dept: CARDIOLOGY | Facility: CLINIC | Age: 53
End: 2018-11-12

## 2018-11-12 DIAGNOSIS — I50.22 CHRONIC SYSTOLIC HEART FAILURE (H): Primary | Chronic | ICD-10-CM

## 2018-11-12 DIAGNOSIS — I50.22 CHRONIC SYSTOLIC CONGESTIVE HEART FAILURE (H): Primary | ICD-10-CM

## 2018-11-13 ENCOUNTER — DOCUMENTATION ONLY (OUTPATIENT)
Dept: CARDIOLOGY | Facility: CLINIC | Age: 53
End: 2018-11-13

## 2018-11-13 ENCOUNTER — PATIENT OUTREACH (OUTPATIENT)
Dept: CARE COORDINATION | Facility: CLINIC | Age: 53
End: 2018-11-13

## 2018-11-13 ASSESSMENT — ENCOUNTER SYMPTOMS
HYPERTENSION: 0
CONSTIPATION: 1
SPUTUM PRODUCTION: 1
WEIGHT GAIN: 0
FEVER: 0
EXERCISE INTOLERANCE: 1
POSTURAL DYSPNEA: 0
JOINT SWELLING: 0
MUSCLE CRAMPS: 0
WEIGHT LOSS: 1
SYNCOPE: 0
RECTAL PAIN: 0
BLOATING: 1
DIARRHEA: 0
DYSPNEA ON EXERTION: 1
LEG PAIN: 1
BRUISES/BLEEDS EASILY: 0
CHILLS: 0
POLYDIPSIA: 0
ABDOMINAL PAIN: 1
SHORTNESS OF BREATH: 1
COUGH: 0
HEARTBURN: 1
HEMOPTYSIS: 0
SWOLLEN GLANDS: 0
PALPITATIONS: 1
DECREASED APPETITE: 1
HALLUCINATIONS: 0
MYALGIAS: 1
INCREASED ENERGY: 1
LIGHT-HEADEDNESS: 0
NAUSEA: 1
BACK PAIN: 1
POLYPHAGIA: 0
BLOOD IN STOOL: 0
SNORES LOUDLY: 0
ALTERED TEMPERATURE REGULATION: 0
HYPOTENSION: 0
NECK PAIN: 1
BOWEL INCONTINENCE: 0
MUSCLE WEAKNESS: 1
NIGHT SWEATS: 0
JAUNDICE: 0
FATIGUE: 1
WHEEZING: 1
STIFFNESS: 1
VOMITING: 1
COUGH DISTURBING SLEEP: 0
SLEEP DISTURBANCES DUE TO BREATHING: 0
ORTHOPNEA: 0
ARTHRALGIAS: 0

## 2018-11-13 ASSESSMENT — MINNESOTA LIVING WITH HEART FAILURE QUESTIONNAIRE (MLHF)
EATING LESS FOODS YOU LIKE: 5
FEELING LIKE A BURDEN TO FAMILY AND FRIENDS: 3
MAKING YOU STAY IN A HOSPITAL: 2
DIFFICULTY SOCIALIZING WITH FAMILY OR FRIENDS: 5
COSTING YOU MONEY FOR MEDICAL CARE: 5
MAKING YOU SHORT OF BREATH: 5
DIFFICULTY WITH SEXUAL ACTIVITIES: 0
WORKING AROUND THE HOUSE OR YARD DIFFICULT: 5
LOSS OF SELF CONTROL IN YOUR LIFE: 4
WALKING ABOUT OR CLIMBING STAIRS DIFFICULT: 5
GIVING YOU SIDE EFFECTS FROM TREATMENTS: 1
DIFFICULTY WORKING TO EARN A LIVING: 5
DIFFICULTY TO CONCENTRATE OR REMEMBERING THINGS: 1
DIFFICULTY SLEEPING WELL AT NIGHT: 2
HAVING TO SIT OR LIE DOWN DURING THE DAY: 5
SWELLING IN ANKLES OR LEGS: 0
MAKING YOU FEEL DEPRESSED: 2
MAKING YOU WORRY: 4
DIFFICULTY WITH RECREATIONAL PASTIMES, SPORTS, HOBBIES: 5
TOTAL_SCORE: 74
TIRED, FATIGUED OR LOW ON ENERGY: 5
DIFFICULTY GOING AWAY FROM HOME: 5

## 2018-11-14 ENCOUNTER — COMMUNICATION - HEALTHEAST (OUTPATIENT)
Dept: INTERNAL MEDICINE | Facility: CLINIC | Age: 53
End: 2018-11-14

## 2018-11-14 ENCOUNTER — COMMUNICATION - HEALTHEAST (OUTPATIENT)
Dept: ANTICOAGULATION | Facility: CLINIC | Age: 53
End: 2018-11-14

## 2018-11-14 DIAGNOSIS — I50.22 SYSTOLIC CHF, CHRONIC (H): ICD-10-CM

## 2018-11-14 DIAGNOSIS — I42.8 NON-ISCHEMIC CARDIOMYOPATHY (H): ICD-10-CM

## 2018-11-15 ENCOUNTER — APPOINTMENT (OUTPATIENT)
Dept: CARDIOLOGY | Facility: CLINIC | Age: 53
DRG: 286 | End: 2018-11-15
Attending: NURSE PRACTITIONER
Payer: MEDICARE

## 2018-11-15 ENCOUNTER — APPOINTMENT (OUTPATIENT)
Dept: MEDSURG UNIT | Facility: CLINIC | Age: 53
DRG: 286 | End: 2018-11-15
Attending: INTERNAL MEDICINE
Payer: MEDICARE

## 2018-11-15 ENCOUNTER — ALLIED HEALTH/NURSE VISIT (OUTPATIENT)
Dept: TRANSPLANT | Facility: CLINIC | Age: 53
DRG: 286 | End: 2018-11-15
Attending: INTERNAL MEDICINE
Payer: MEDICARE

## 2018-11-15 ENCOUNTER — RECORDS - HEALTHEAST (OUTPATIENT)
Dept: ADMINISTRATIVE | Facility: OTHER | Age: 53
End: 2018-11-15

## 2018-11-15 ENCOUNTER — HOSPITAL ENCOUNTER (INPATIENT)
Facility: CLINIC | Age: 53
LOS: 8 days | Discharge: HOME HEALTH | DRG: 286 | End: 2018-11-23
Attending: INTERNAL MEDICINE | Admitting: INTERNAL MEDICINE
Payer: MEDICARE

## 2018-11-15 ENCOUNTER — OFFICE VISIT (OUTPATIENT)
Dept: CARDIOLOGY | Facility: CLINIC | Age: 53
DRG: 286 | End: 2018-11-15
Attending: INTERNAL MEDICINE
Payer: MEDICARE

## 2018-11-15 VITALS
SYSTOLIC BLOOD PRESSURE: 95 MMHG | WEIGHT: 133.9 LBS | BODY MASS INDEX: 20.29 KG/M2 | HEART RATE: 63 BPM | HEIGHT: 68 IN | OXYGEN SATURATION: 97 % | DIASTOLIC BLOOD PRESSURE: 70 MMHG

## 2018-11-15 DIAGNOSIS — I50.22 CHRONIC SYSTOLIC HEART FAILURE (H): Chronic | ICD-10-CM

## 2018-11-15 DIAGNOSIS — I50.22 CHRONIC SYSTOLIC CONGESTIVE HEART FAILURE (H): ICD-10-CM

## 2018-11-15 DIAGNOSIS — R79.9 ABNORMAL FINDING OF BLOOD CHEMISTRY: ICD-10-CM

## 2018-11-15 DIAGNOSIS — Q26.1 PERSISTENT LEFT SVC (SUPERIOR VENA CAVA): Primary | ICD-10-CM

## 2018-11-15 DIAGNOSIS — K59.09 OTHER CONSTIPATION: ICD-10-CM

## 2018-11-15 DIAGNOSIS — F10.20 UNCOMPLICATED ALCOHOL DEPENDENCE (H): ICD-10-CM

## 2018-11-15 DIAGNOSIS — Z12.5 SCREENING PSA (PROSTATE SPECIFIC ANTIGEN): ICD-10-CM

## 2018-11-15 DIAGNOSIS — I50.22 CHRONIC SYSTOLIC HEART FAILURE (H): Primary | ICD-10-CM

## 2018-11-15 PROBLEM — I50.9 HEART FAILURE, CHRONIC, WITH ACUTE DECOMPENSATION (H): Status: ACTIVE | Noted: 2018-11-15

## 2018-11-15 LAB
6 MIN WALK (FT): 670 FT
6 MIN WALK (M): 204 M
ABO + RH BLD: NORMAL
ABO + RH BLD: NORMAL
ALBUMIN UR-MCNC: NEGATIVE MG/DL
AMPHETAMINES UR QL SCN: NEGATIVE
ANION GAP SERPL CALCULATED.3IONS-SCNC: 11 MMOL/L (ref 3–14)
ANION GAP SERPL CALCULATED.3IONS-SCNC: 9 MMOL/L (ref 3–14)
APPEARANCE UR: CLEAR
APTT PPP: 42 SEC (ref 22–37)
BARBITURATES UR QL: NEGATIVE
BASE EXCESS BLDV CALC-SCNC: 2.7 MMOL/L
BENZODIAZ UR QL: NEGATIVE
BILIRUB UR QL STRIP: NEGATIVE
BLD GP AB SCN SERPL QL: NORMAL
BLOOD BANK CMNT PATIENT-IMP: NORMAL
BUN SERPL-MCNC: 40 MG/DL (ref 7–30)
BUN SERPL-MCNC: 43 MG/DL (ref 7–30)
CALCIUM SERPL-MCNC: 8.2 MG/DL (ref 8.5–10.1)
CALCIUM SERPL-MCNC: 9.1 MG/DL (ref 8.5–10.1)
CANNABINOIDS UR QL SCN: NEGATIVE
CARDIOLIPIN ANTIBODY IGG: <1.6 GPL-U/ML (ref 0–19.9)
CARDIOLIPIN ANTIBODY IGM: 0.2 MPL-U/ML (ref 0–19.9)
CHLORIDE SERPL-SCNC: 100 MMOL/L (ref 94–109)
CHLORIDE SERPL-SCNC: 94 MMOL/L (ref 94–109)
CHOLEST SERPL-MCNC: 194 MG/DL
CO2 SERPL-SCNC: 22 MMOL/L (ref 20–32)
CO2 SERPL-SCNC: 24 MMOL/L (ref 20–32)
COCAINE UR QL: NEGATIVE
COLOR UR AUTO: YELLOW
CREAT SERPL-MCNC: 1.4 MG/DL (ref 0.66–1.25)
CREAT SERPL-MCNC: 1.7 MG/DL (ref 0.66–1.25)
CRP SERPL-MCNC: 6.1 MG/L (ref 0–8)
ERYTHROCYTE [DISTWIDTH] IN BLOOD BY AUTOMATED COUNT: 20.4 % (ref 10–15)
ETHANOL UR QL SCN: NEGATIVE
ETHANOL UR QL SCN: NEGATIVE
FERRITIN SERPL-MCNC: 1045 NG/ML (ref 26–388)
FIO2-PRE: 21 %
GFR SERPL CREATININE-BSD FRML MDRD: 42 ML/MIN/1.7M2
GFR SERPL CREATININE-BSD FRML MDRD: 52 ML/MIN/1.7M2
GLUCOSE BLDC GLUCOMTR-MCNC: 122 MG/DL (ref 70–99)
GLUCOSE SERPL-MCNC: 128 MG/DL (ref 70–99)
GLUCOSE SERPL-MCNC: 97 MG/DL (ref 70–99)
GLUCOSE UR STRIP-MCNC: NEGATIVE MG/DL
HBA1C MFR BLD: 6.4 % (ref 0–5.6)
HCO3 BLDV-SCNC: 27 MMOL/L (ref 21–28)
HCT VFR BLD AUTO: 49.8 % (ref 40–53)
HDLC SERPL-MCNC: 42 MG/DL
HGB BLD-MCNC: 16.2 G/DL (ref 13.3–17.7)
HGB UR QL STRIP: NEGATIVE
HYALINE CASTS #/AREA URNS LPF: 55 /LPF (ref 0–2)
INR PPP: 2 (ref 0.86–1.14)
INR PPP: 2.09 (ref 0.86–1.14)
INR PPP: 2.09 (ref 0.9–1.1)
KETONES UR STRIP-MCNC: NEGATIVE MG/DL
LDH SERPL L TO P-CCNC: 165 U/L (ref 85–227)
LDLC SERPL CALC-MCNC: 130 MG/DL
LEUKOCYTE ESTERASE UR QL STRIP: NEGATIVE
MCH RBC QN AUTO: 28.6 PG (ref 26.5–33)
MCHC RBC AUTO-ENTMCNC: 32.5 G/DL (ref 31.5–36.5)
MCV RBC AUTO: 88 FL (ref 78–100)
MRSA DNA SPEC QL NAA+PROBE: NEGATIVE
MUCOUS THREADS #/AREA URNS LPF: PRESENT /LPF
NITRATE UR QL: NEGATIVE
NONHDLC SERPL-MCNC: 152 MG/DL
NT-PROBNP SERPL-MCNC: ABNORMAL PG/ML (ref 0–125)
O2/TOTAL GAS SETTING VFR VENT: 21 %
OPIATES UR QL SCN: NEGATIVE
OXYHGB MFR BLDV: 60 %
OXYHGB MFR BLDV: 61 %
PCO2 BLDV: 39 MM HG (ref 40–50)
PCP UR QL SCN: NEGATIVE
PH BLDV: 7.45 PH (ref 7.32–7.43)
PH UR STRIP: 5 PH (ref 5–7)
PHOSPHATE SERPL-MCNC: 3.4 MG/DL (ref 2.5–4.5)
PLATELET # BLD AUTO: 311 10E9/L (ref 150–450)
PO2 BLDV: 35 MM HG (ref 25–47)
POTASSIUM SERPL-SCNC: 3.7 MMOL/L (ref 3.4–5.3)
POTASSIUM SERPL-SCNC: 4.7 MMOL/L (ref 3.4–5.3)
PREALB SERPL IA-MCNC: 29 MG/DL (ref 15–45)
PSA SERPL-ACNC: 0.38 UG/L (ref 0–4)
RBC # BLD AUTO: 5.67 10E12/L (ref 4.4–5.9)
RBC #/AREA URNS AUTO: <1 /HPF (ref 0–2)
SODIUM SERPL-SCNC: 127 MMOL/L (ref 133–144)
SODIUM SERPL-SCNC: 134 MMOL/L (ref 133–144)
SOURCE: ABNORMAL
SP GR UR STRIP: 1.01 (ref 1–1.03)
SPECIMEN EXP DATE BLD: NORMAL
SPECIMEN SOURCE: NORMAL
TRANSFERRIN SERPL-MCNC: 349 MG/DL (ref 210–360)
TRIGL SERPL-MCNC: 112 MG/DL
TSH SERPL DL<=0.005 MIU/L-ACNC: 15.97 MU/L (ref 0.4–4)
URATE SERPL-MCNC: 9.9 MG/DL (ref 3.5–7.2)
UROBILINOGEN UR STRIP-MCNC: 0 MG/DL (ref 0–2)
VIT B12 SERPL-MCNC: 1857 PG/ML (ref 193–986)
WBC # BLD AUTO: 7.2 10E9/L (ref 4–11)
WBC #/AREA URNS AUTO: 1 /HPF (ref 0–5)

## 2018-11-15 PROCEDURE — 85730 THROMBOPLASTIN TIME PARTIAL: CPT | Performed by: INTERNAL MEDICINE

## 2018-11-15 PROCEDURE — 99214 OFFICE O/P EST MOD 30 MIN: CPT | Mod: ZP | Performed by: NURSE PRACTITIONER

## 2018-11-15 PROCEDURE — 4A1239Z MONITORING OF CARDIAC OUTPUT, PERCUTANEOUS APPROACH: ICD-10-PCS | Performed by: INTERNAL MEDICINE

## 2018-11-15 PROCEDURE — 25000132 ZZH RX MED GY IP 250 OP 250 PS 637: Mod: GY | Performed by: INTERNAL MEDICINE

## 2018-11-15 PROCEDURE — 02HQ32Z INSERTION OF MONITORING DEVICE INTO RIGHT PULMONARY ARTERY, PERCUTANEOUS APPROACH: ICD-10-PCS | Performed by: INTERNAL MEDICINE

## 2018-11-15 PROCEDURE — 85613 RUSSELL VIPER VENOM DILUTED: CPT | Performed by: INTERNAL MEDICINE

## 2018-11-15 PROCEDURE — A9270 NON-COVERED ITEM OR SERVICE: HCPCS | Mod: GY | Performed by: INTERNAL MEDICINE

## 2018-11-15 PROCEDURE — 85610 PROTHROMBIN TIME: CPT | Performed by: INTERNAL MEDICINE

## 2018-11-15 PROCEDURE — C1894 INTRO/SHEATH, NON-LASER: HCPCS

## 2018-11-15 PROCEDURE — 27210787 ZZH MANIFOLD CR2

## 2018-11-15 PROCEDURE — 82810 BLOOD GASES O2 SAT ONLY: CPT | Performed by: INTERNAL MEDICINE

## 2018-11-15 PROCEDURE — 82803 BLOOD GASES ANY COMBINATION: CPT | Performed by: INTERNAL MEDICINE

## 2018-11-15 PROCEDURE — 3E033XZ INTRODUCTION OF VASOPRESSOR INTO PERIPHERAL VEIN, PERCUTANEOUS APPROACH: ICD-10-PCS | Performed by: INTERNAL MEDICINE

## 2018-11-15 PROCEDURE — 93451 RIGHT HEART CATH: CPT | Mod: 26 | Performed by: INTERNAL MEDICINE

## 2018-11-15 PROCEDURE — 85525 HEPARIN NEUTRALIZATION: CPT | Performed by: INTERNAL MEDICINE

## 2018-11-15 PROCEDURE — 4A023N6 MEASUREMENT OF CARDIAC SAMPLING AND PRESSURE, RIGHT HEART, PERCUTANEOUS APPROACH: ICD-10-PCS | Performed by: INTERNAL MEDICINE

## 2018-11-15 PROCEDURE — 20000004 ZZH R&B ICU UMMC

## 2018-11-15 PROCEDURE — 25000125 ZZHC RX 250: Performed by: NURSE PRACTITIONER

## 2018-11-15 PROCEDURE — 4A133B3 MONITORING OF ARTERIAL PRESSURE, PULMONARY, PERCUTANEOUS APPROACH: ICD-10-PCS | Performed by: INTERNAL MEDICINE

## 2018-11-15 PROCEDURE — 00000401 ZZHCL STATISTIC THROMBIN TIME NC: Performed by: INTERNAL MEDICINE

## 2018-11-15 PROCEDURE — 87640 STAPH A DNA AMP PROBE: CPT | Performed by: INTERNAL MEDICINE

## 2018-11-15 PROCEDURE — 93451 RIGHT HEART CATH: CPT

## 2018-11-15 PROCEDURE — 99223 1ST HOSP IP/OBS HIGH 75: CPT | Mod: 25 | Performed by: INTERNAL MEDICINE

## 2018-11-15 PROCEDURE — 87641 MR-STAPH DNA AMP PROBE: CPT | Performed by: INTERNAL MEDICINE

## 2018-11-15 PROCEDURE — 27210982 ZZH KIT RT HC TOTES DISP CR7

## 2018-11-15 PROCEDURE — 82805 BLOOD GASES W/O2 SATURATION: CPT | Performed by: INTERNAL MEDICINE

## 2018-11-15 PROCEDURE — 85027 COMPLETE CBC AUTOMATED: CPT | Performed by: INTERNAL MEDICINE

## 2018-11-15 PROCEDURE — G0463 HOSPITAL OUTPT CLINIC VISIT: HCPCS | Mod: ZF

## 2018-11-15 PROCEDURE — 27211181 ZZH BALLOON TIP PRESSURE CR5

## 2018-11-15 PROCEDURE — 80048 BASIC METABOLIC PNL TOTAL CA: CPT | Performed by: INTERNAL MEDICINE

## 2018-11-15 PROCEDURE — 36415 COLL VENOUS BLD VENIPUNCTURE: CPT | Performed by: INTERNAL MEDICINE

## 2018-11-15 PROCEDURE — 25000132 ZZH RX MED GY IP 250 OP 250 PS 637: Mod: GY | Performed by: STUDENT IN AN ORGANIZED HEALTH CARE EDUCATION/TRAINING PROGRAM

## 2018-11-15 PROCEDURE — 00000146 ZZHCL STATISTIC GLUCOSE BY METER IP

## 2018-11-15 PROCEDURE — 85597 PHOSPHOLIPID PLTLT NEUTRALIZ: CPT | Performed by: INTERNAL MEDICINE

## 2018-11-15 PROCEDURE — 25000128 H RX IP 250 OP 636: Performed by: INTERNAL MEDICINE

## 2018-11-15 PROCEDURE — A9270 NON-COVERED ITEM OR SERVICE: HCPCS | Mod: GY | Performed by: STUDENT IN AN ORGANIZED HEALTH CARE EDUCATION/TRAINING PROGRAM

## 2018-11-15 PROCEDURE — 40000166 ZZH STATISTIC PP CARE STAGE 1

## 2018-11-15 RX ORDER — DOBUTAMINE HYDROCHLORIDE 200 MG/100ML
2.5 INJECTION INTRAVENOUS CONTINUOUS
Status: DISCONTINUED | OUTPATIENT
Start: 2018-11-15 | End: 2018-11-18

## 2018-11-15 RX ORDER — NALOXONE HYDROCHLORIDE 0.4 MG/ML
.1-.4 INJECTION, SOLUTION INTRAMUSCULAR; INTRAVENOUS; SUBCUTANEOUS
Status: DISCONTINUED | OUTPATIENT
Start: 2018-11-15 | End: 2018-11-23 | Stop reason: HOSPADM

## 2018-11-15 RX ORDER — SPIRONOLACTONE 25 MG/1
25 TABLET ORAL DAILY
Status: DISCONTINUED | OUTPATIENT
Start: 2018-11-16 | End: 2018-11-16

## 2018-11-15 RX ORDER — ONDANSETRON 4 MG/1
4 TABLET, ORALLY DISINTEGRATING ORAL EVERY 8 HOURS PRN
Status: DISCONTINUED | OUTPATIENT
Start: 2018-11-15 | End: 2018-11-23 | Stop reason: HOSPADM

## 2018-11-15 RX ORDER — WARFARIN SODIUM 5 MG/1
5 TABLET ORAL
Status: COMPLETED | OUTPATIENT
Start: 2018-11-15 | End: 2018-11-15

## 2018-11-15 RX ORDER — MESALAMINE 800 MG/1
2400 TABLET, DELAYED RELEASE ORAL 2 TIMES DAILY
Status: DISCONTINUED | OUTPATIENT
Start: 2018-11-15 | End: 2018-11-23 | Stop reason: HOSPADM

## 2018-11-15 RX ORDER — LEVOTHYROXINE SODIUM 88 UG/1
88 TABLET ORAL DAILY
Status: DISCONTINUED | OUTPATIENT
Start: 2018-11-16 | End: 2018-11-23 | Stop reason: HOSPADM

## 2018-11-15 RX ORDER — LIDOCAINE 40 MG/G
CREAM TOPICAL
Status: CANCELLED | OUTPATIENT
Start: 2018-11-15

## 2018-11-15 RX ORDER — TAMSULOSIN HYDROCHLORIDE 0.4 MG/1
0.4 CAPSULE ORAL
Status: DISCONTINUED | OUTPATIENT
Start: 2018-11-15 | End: 2018-11-23 | Stop reason: HOSPADM

## 2018-11-15 RX ORDER — AMIODARONE HYDROCHLORIDE 200 MG/1
200 TABLET ORAL DAILY
Status: DISCONTINUED | OUTPATIENT
Start: 2018-11-16 | End: 2018-11-23 | Stop reason: HOSPADM

## 2018-11-15 RX ORDER — LIDOCAINE 40 MG/G
CREAM TOPICAL
Status: COMPLETED | OUTPATIENT
Start: 2018-11-15 | End: 2018-11-15

## 2018-11-15 RX ORDER — DULOXETIN HYDROCHLORIDE 30 MG/1
30 CAPSULE, DELAYED RELEASE ORAL DAILY
Status: DISCONTINUED | OUTPATIENT
Start: 2018-11-16 | End: 2018-11-16

## 2018-11-15 RX ORDER — AZELASTINE 1 MG/ML
2 SPRAY, METERED NASAL 2 TIMES DAILY
Status: DISCONTINUED | OUTPATIENT
Start: 2018-11-15 | End: 2018-11-23 | Stop reason: HOSPADM

## 2018-11-15 RX ORDER — LIDOCAINE 40 MG/G
CREAM TOPICAL
Status: DISCONTINUED | OUTPATIENT
Start: 2018-11-15 | End: 2018-11-15 | Stop reason: HOSPADM

## 2018-11-15 RX ORDER — FUROSEMIDE 10 MG/ML
80 INJECTION INTRAMUSCULAR; INTRAVENOUS ONCE
Status: COMPLETED | OUTPATIENT
Start: 2018-11-15 | End: 2018-11-15

## 2018-11-15 RX ORDER — MONTELUKAST SODIUM 10 MG/1
10 TABLET ORAL EVERY EVENING
Status: DISCONTINUED | OUTPATIENT
Start: 2018-11-15 | End: 2018-11-23 | Stop reason: HOSPADM

## 2018-11-15 RX ORDER — ALBUTEROL SULFATE 90 UG/1
2 AEROSOL, METERED RESPIRATORY (INHALATION) EVERY 6 HOURS PRN
Status: DISCONTINUED | OUTPATIENT
Start: 2018-11-15 | End: 2018-11-23 | Stop reason: HOSPADM

## 2018-11-15 RX ORDER — LEVOTHYROXINE SODIUM 25 UG/1
75 TABLET ORAL DAILY
Status: DISCONTINUED | OUTPATIENT
Start: 2018-11-16 | End: 2018-11-15

## 2018-11-15 RX ADMIN — TAMSULOSIN HYDROCHLORIDE 0.4 MG: 0.4 CAPSULE ORAL at 23:13

## 2018-11-15 RX ADMIN — Medication 1 MG: at 23:16

## 2018-11-15 RX ADMIN — DOBUTAMINE HYDROCHLORIDE 2.5 MCG/KG/MIN: 200 INJECTION INTRAVENOUS at 21:30

## 2018-11-15 RX ADMIN — MESALAMINE 2400 MG: 800 TABLET, DELAYED RELEASE ORAL at 23:13

## 2018-11-15 RX ADMIN — LIDOCAINE: 40 CREAM TOPICAL at 15:12

## 2018-11-15 RX ADMIN — WARFARIN SODIUM 5 MG: 5 TABLET ORAL at 23:13

## 2018-11-15 RX ADMIN — MONTELUKAST SODIUM 10 MG: 10 TABLET, COATED ORAL at 23:13

## 2018-11-15 RX ADMIN — FUROSEMIDE 80 MG: 10 INJECTION, SOLUTION INTRAVENOUS at 23:13

## 2018-11-15 ASSESSMENT — PAIN SCALES - GENERAL: PAINLEVEL: NO PAIN (0)

## 2018-11-15 ASSESSMENT — ACTIVITIES OF DAILY LIVING (ADL): ADLS_ACUITY_SCORE: 17

## 2018-11-15 NOTE — MR AVS SNAPSHOT
After Visit Summary   11/15/2018    Everton Larios    MRN: [id number]           Patient Information     Date Of Birth          1965        Visit Information        Provider Department      11/15/2018 11:00 AM Nurse, [facility]        Today's Diagnoses     Chronic systolic heart failure (H)    -  1       Follow-ups after your visit        Your next 10 appointments already scheduled     Nov 19, 2018 11:00 AM CST   XR CHEST 2 VIEWS with XR[address] [place] [address] [facility] ([facility])    [address] [place] [id number] [phone]           How do I prepare for my exam? (Food and drink instructions) No Food and Drink Restrictions.  How do I prepare for my exam? (Other instructions) You do not need to do anything special for this exam.  What should I wear: Wear comfortable clothes.  How long does the exam take: Most scans take less than 5 minutes.  What should I bring: Bring a list of your medicines, including vitamins, minerals and over-the-counter drugs. It is safest to leave personal items at home.  Do I need a :  No  is needed.  What do I need to tell my doctor: Tell your doctor if there s any chance you are pregnant.  What should I do after the exam: No restrictions, You may resume normal activities.  What is this test: An image of a specific body part shown in shades of black and white.  Who should I call with questions: If you have any questions, please call the Imaging Department where you will have your exam. Directions, parking instructions, and other information is available on our website, [email or web address].            Nov 19, 2018 11:30 AM CST   US ABDOMEN COMPLETE with UCUS1   [facility] ([facility])    [address] [address] [phone]           How do I prepare for my exam? (Food and drink instructions) Adults: No  eating, smoking, gum chewing or drinking for 8 hours before the exam. You may take medicine with a small sip of water.  Children: * Infants, breast-fed: may have breast milk up to 2 hours before exam. * Infants, formula: may have bottle until 4 hours before exam. * Children 1-5 years: No food or drink for 4 hours before exam. * Children 6 -12 years: No food or drink for 6 hours before exam. * Children over 12 years: No food or drink for 8 hours before exam.  * J Tube Fed: No need to stop feedings.  What should I wear: Wear comfortable clothes.  How long does the exam take: Most ultrasounds take 30 to 60 minutes.  What should I bring: Bring a list of your medicines, including vitamins, minerals and over-the-counter drugs. It is safest to leave personal items at home.  Do I need a :  No  is needed.  What do I need to tell my doctor: Tell your doctor about any allergies you may have.  What should I do after the exam: No restrictions, You may resume normal activities.  What is this test: An ultrasound uses sound waves to make pictures of the body. Sound waves do not cause pain. The only discomfort may be the pressure of the wand against your skin or full bladder.  Who should I call with questions: If you have any questions, please call the Imaging Department where you will have your exam. Directions, parking instructions, and other information is available on our website, [email or web address].            Nov 19, 2018  1:30 PM CST   (Arrive by 1:15 PM)   SOT SOCIAL WORK EVAL with LUKE Morrison   [facility] ([facility])    [address]  [address]  [facility] [facility] [address] [phone]            Nov 19, 2018  3:20 PM CST   CT HEAD W/O CONTRAST with UCCT1   [facility] CT ([facility])    [address]  1st [address]  [facility] [id number] [phone]           How do I prepare for my exam? (Food and  drink instructions) No Food and Drink Restrictions.  How do I prepare for my exam? (Other instructions) You do not need to do anything special to prepare for this exam. For a sinus scan: Use your nose spray (nasal decongestant spray) as directed.  What should I wear: Please wear loose clothing, such as a sweat suit or jogging clothes. Avoid snaps, zippers and other metal. We may ask you to undress and put on a hospital gown.  How long does the exam take: Most scans take less than 20 minutes.  What should I bring: Please bring any scans or X-rays taken at other hospitals, if similar tests were done. Also bring a list of your medicines, including vitamins, minerals and over-the-counter drugs. It is safest to leave personal items at home.  Do I need a : No  is needed.  What do I need to tell my doctor? Be sure to tell your doctor: * If you have any allergies. * If there s any chance you are pregnant. * If you are breastfeeding.  What should I do after the exam: No restrictions, You may resume normal activities.  What is this test: A CT (computed tomography) scan is a series of pictures that allows us to look inside your body. The scanner creates images of the body in cross sections, much like slices of bread. This helps us see any problems more clearly.  Who should I call with questions: If you have any questions, please call the Imaging Department where you will have your exam. Directions, parking instructions, and other information is available on our website, [email or web address].            Nov 19, 2018  3:40 PM CST   CT ABDOMEN WO & W CHEST PELVIS W CONTRAST with UCCT1   [facility] CT ([facility])    [address] [address] [phone]           How do I prepare for my exam? (Food and drink instructions) To prepare: Do not eat or drink for 2 hours before your exam. If you need to take medicine, you may take it with small sips  of water. (We may ask you to take liquid medicine as well.)  How do I prepare for my exam? (Other instructions) Please arrive 30 minutes early for your CT.  Once in the department you might be asked to drink water 15-20 minutes prior to your exam.  If indicated you may be asked to drink an oral contrast in advance of your CT.  If this is the case, the imaging team will let you know or be in contact with you prior to your appointment  Patients over 70 or patients with diabetes or kidney problems: If you haven t had a blood test (creatinine test) within the last 30 days, the Cardiologist/Radiologist may require you to get this test prior to your exam.  If you have diabetes:  Continue to take your metformin medication on the day of your exam  What should I wear: Please wear loose clothing, such as a sweat suit or jogging clothes. Avoid snaps, zippers and other metal. We may ask you to undress and put on a hospital gown.  How long does the exam take: Most scans take less than 20 minutes.  What should I bring: Please bring any scans or X-rays taken at other hospitals, if similar tests were done. Also bring a list of your medicines, including vitamins, minerals and over-the-counter drugs. It is safest to leave personal items at home.  Do I need a : No  is needed.  What do I need to tell my doctor? Be sure to tell your doctor: * If you have any allergies. * If there s any chance you are pregnant. * If you are breastfeeding.  What should I do after the exam: No restrictions, You may resume normal activities.  What is this test: A CT (computed tomography) scan is a series of pictures that allows us to look inside your body. The scanner creates images of the body in cross sections, much like slices of bread. This helps us see any problems more clearly. You may receive contrast (X-ray dye) before or during your scan. You will be asked to drink the contrast.  Who should I call with questions: If you have any  questions, please call the Imaging Department where you will have your exam. Directions, parking instructions, and other information is available on our website, [email or web address].            Nov 19, 2018  4:00 PM CST   (Arrive by 3:45 PM)   New Patient Visit with Ben White MD   [facility] ([facility])    [address]  [address]  [facility] [address] [phone]            Nov 21, 2018  8:00 AM CST   (Arrive by 7:45 AM)   New Patient Visit with Polo Leigh MD   [facility] ([facility])    [address]  [address]  [facility] [facility] [address] [phone]            Nov 29, 2018 12:30 PM CST   (Arrive by 12:15 PM)   New Patient Visit with Bettina Giron PsyD   [facility] ([facility])    [address]  3rd Floor  [facility] [id number] [phone]              Future tests that were ordered for you today     Open Standing Orders        Priority Remaining Interval Expires Ordered    Glucose Whole Blood POCT Routine 1/1 CONDITIONAL X 1  11/15/2018    Glucose Whole Blood POCT Routine 1/1 CONDITIONAL X 1  11/15/2018          Open Future Orders        Priority Expected Expires Ordered    Heart Cath Right heart cath Routine 11/15/2018 11/15/2019 11/15/2018            Who to contact     If you have questions or need follow up information about today's clinic visit or your schedule please contact [facility] directly at [phone].  Normal or non-critical lab and imaging results will be communicated to you by MyChart, letter or phone within 4 business days after the clinic has received the results. If you do not hear from us within 7 days, please contact the clinic through [employer] or phone. If you have a critical or abnormal lab result, we will notify you by phone as soon as possible.  Submit refill requests through [employer] or call your pharmacy  and they will forward the refill request to us. Please allow 3 business days for your refill to be completed.          Additional Information About Your Visit        [employer] Information     [employer] gives you secure access to your electronic health record. If you see a primary care provider, you can also send messages to your care team and make appointments. If you have questions, please call your primary care clinic.  If you do not have a primary care provider, please call [phone] and they will assist you.        Care EveryWhere ID     This is your Care EveryWhere ID. This could be used by other organizations to access your [place] medical records  [id number]         Blood Pressure from Last 3 Encounters:   11/15/18 102/77   11/15/18 95/70   11/02/18 91/60    Weight from Last 3 Encounters:   11/15/18 60.3 kg (133 lb)   11/15/18 60.7 kg (133 lb 14.4 oz)   11/02/18 65.8 kg (145 lb)              Today, you had the following     No orders found for display       Primary Care Provider Office Phone # Fax #    [facility] [phone]       [address] [address]        Equal Access to Services     BELIA GOLDBERG : Hadii aad ku hadasho Soirwinali, waaxda luqadaha, qaybta kaalmada adeegyada, dean topete. So [facility] [phone].    ATENCIÓN: Si habla español, tiene a anaya disposición servicios gratuitos de asistencia lingüística. [facility] [phone].    We comply with applicable federal civil rights laws and [place] laws. We do not discriminate on the basis of race, color, national origin, age, disability, sex, sexual orientation, or gender identity.            Thank you!     Thank you for choosing [facility]  for your care. Our goal is always to provide you with excellent care. Hearing back from our patients is one way we can continue to improve our services. Please take a few minutes to complete the written survey that you may  receive in the mail after your visit with us. Thank you!             Your Updated Medication List - Protect others around you: Learn how to safely use, store and throw away your medicines at www.disposemymeds.org.          This list is accurate as of 11/15/18  2:39 PM.  Always use your most recent med list.                   Brand Name Dispense Instructions for use Diagnosis    albuterol 108 (90 Base) MCG/ACT inhaler    PROAIR HFA/PROVENTIL HFA/VENTOLIN HFA     Inhale 2 puffs into the lungs every 6 hours as needed for shortness of breath / dyspnea or wheezing    Intermittent asthma without complication, unspecified asthma severity       amiodarone 200 MG tablet    PACERONE/CODARONE     Take 1 tablet (200 mg) by mouth daily    Paroxysmal atrial fibrillation (H)       ASACOL  MG EC tablet   Generic drug:  mesalamine      Take 3 tablets (2,400 mg) by mouth 2 times daily    Ulcerative colitis with complication, unspecified location (H)       azelastine 0.1 % nasal spray    ASTELIN     Spray 2 sprays into both nostrils 2 times daily    Chronic rhinitis       BEANO PO      Take by mouth as needed        carvedilol 12.5 MG tablet    COREG     Take 6.25 mg by mouth 2 times daily (with meals)    Chronic systolic heart failure (H)       CYMBALTA 30 MG EC capsule   Generic drug:  DULoxetine      Take 1 capsule (30 mg) by mouth daily    Depression, unspecified depression type       FLOMAX 0.4 MG capsule   Generic drug:  tamsulosin      Take 1 capsule (0.4 mg) by mouth daily (with dinner)    Benign prostatic hyperplasia, unspecified whether lower urinary tract symptoms present       furosemide 80 MG tablet    LASIX     Take 80 mg by mouth 3 times daily    Chronic systolic heart failure (H)       levothyroxine 50 MCG tablet    SYNTHROID/LEVOTHROID     Take 1 tablet (50 mcg) by mouth daily    Hypothyroidism due to medication       MIRALAX Packet   Generic drug:  polyethylene glycol      Take 17 g by mouth 4 times daily     Constipation, unspecified constipation type       omeprazole 40 MG capsule    priLOSEC     Take 1 capsule (40 mg) by mouth daily In pm    Nausea       ondansetron 4 MG ODT tab    ZOFRAN-ODT     Take 1 tablet (4 mg) by mouth every 8 hours as needed for nausea    Nausea       potassium chloride SA 20 MEQ CR tablet    K-DUR/KLOR-CON M    180 tablet    Take 2 tablets (40 mEq) by mouth daily    Hypokalemia       Simethicone 125 MG Caps      Take 1 capsule by mouth 4 times daily    Nausea       SINGULAIR 10 MG tablet   Generic drug:  montelukast      Take 1 tablet (10 mg) by mouth At Bedtime    Intermittent asthma without complication, unspecified asthma severity       spironolactone 25 MG tablet    ALDACTONE     Take 1 tablet (25 mg) by mouth daily    Chronic systolic heart failure (H)       warfarin 5 MG tablet    COUMADIN     Take 2.5 mg 2 dys/wk and 5 mg all others    Paroxysmal atrial fibrillation (H)

## 2018-11-15 NOTE — CONSULTS
"Bhumika Vera, CNP    [address] [place] [address]    [phone] (Fax)       Gunnar Ro MD    [address] [place] [address]    [phone]      Fredrick Wolff M.D.  [facility]     Frederick Akers M.D.  [facility]     Paddy Velarde M.D.  [facility]         Problem List  1. Atrial fibrillation  2. Congenital heart disease/ASD  3. Functional bowel vs JONATHAN  4. Cardiomyopathy with EF 16%  5. AICD  6. Hypothyroidism  7. Remote history smoking  8. Asthma  9. Hypothyroidism  10. GERD with Pierce's esophagus    11. History of remote GI bleed  12. History of splenic embolization  13. Hyponatremia  14. Elevated ferritin  15. CKD  16. Depression  17. Ambulatory cardiogenic shock    Suggest:  1. Admit  To ICU for parenteral inotrope and volume status adjustment  2. Accelerate evaluation for LVAD  3. GI consult, in-patient, if not completed \" What is the nature of his GI maladies and prognosis/\"  4. May need IABP in interim      I was asked to see this patient for clinical deterioration while undergoing evaluation for LVAD and/or cardiac transplantation.  He complained of continuing exercise intolerance and abdominal bloating.   He continues to lose weight.  CPST demonstrated failure to achieve anaerobic threshold, elevated VE/VCO2.  He was previously noted to be relatively bradycardic and his rhythm management system was reprogrammed to a higher heart rate but he felt unwell with this.      Medications reviewed:    Amiodarone, synthroid, warfarin, carvedilol, furosemide, tamsulosin    Alert, oriented, cool, basilar rales, increased JVP, +HJR,, variable S1,, abdomen soft, modest edema          Results for ANA ROSA BALDWIN (MRN [id number]) as of 11/15/2018 16:58   Ref. Range 11/15/2018 09:55 11/15/2018 09:56 11/15/2018 10:00   Sodium Latest Ref Range: 133 - 144 mmol/L 127 (L)     Potassium Latest Ref Range: 3.4 - 5.3 mmol/L 4.7  "    Chloride Latest Ref Range: 94 - 109 mmol/L 94     Carbon Dioxide Latest Ref Range: 20 - 32 mmol/L 24     Urea Nitrogen Latest Ref Range: 7 - 30 mg/dL 43 (H)     Creatinine Latest Ref Range: 0.66 - 1.25 mg/dL 1.70 (H)     GFR Estimate Latest Ref Range: >60 mL/min/1.7m2 42 (L)     GFR Estimate If Black Latest Ref Range: >60 mL/min/1.7m2 51 (L)     Calcium Latest Ref Range: 8.5 - 10.1 mg/dL 9.1     Anion Gap Latest Ref Range: 3 - 14 mmol/L 9     Phosphorus Latest Ref Range: 2.5 - 4.5 mg/dL 3.4     Prealbumin Latest Ref Range: 15 - 45 mg/dL 29     Hemoglobin A1C Latest Ref Range: 0 - 5.6 % 6.4 (H)     Cholesterol Latest Ref Range: <200 mg/dL 194     CRP Inflammation Latest Ref Range: 0.0 - 8.0 mg/L 6.1     Ferritin Latest Ref Range: 26 - 388 ng/mL 1045 (H)     HDL Cholesterol Latest Ref Range: >39 mg/dL 42     Lactate Dehydrogenase Latest Ref Range: 85 - 227 U/L 165     LDL Cholesterol Calculated Latest Ref Range: <100 mg/dL 130 (H)     Non HDL Cholesterol Latest Ref Range: <130 mg/dL 152 (H)     N-Terminal Pro Bnp Latest Ref Range: 0 - 125 pg/mL [address] (H)     PSA Latest Ref Range: 0 - 4 ug/L 0.38     Transferrin Latest Ref Range: 210 - 360 mg/dL 349     Triglycerides Latest Ref Range: <150 mg/dL 112     TSH Latest Ref Range: 0.40 - 4.00 mU/L 15.97 (H)     Uric Acid Latest Ref Range: 3.5 - 7.2 mg/dL 9.9 (H)     Vitamin B12 Latest Ref Range: 193 - 986 pg/mL 1857 (H)     Glucose Latest Ref Range: 70 - 99 mg/dL 128 (H)     INR Latest Ref Range: 0.86 - 1.14  2.00 (H)     PTT Latest Ref Range: 22 - 37 sec 42 (H)     ABO Unknown O     RH(D) Unknown Pos     Antibody Screen Unknown Neg     Test Valid Only At Latest Units:     [facility].     Specimen Expires Unknown 11/18/2018     Current Catherization  PCP 28, CI 1.2 RA 12, PA 57/29mean 37  /71  Previous Catherization    1. HR 64 bpm  2. /25/75 mmHg  3. RA 15/15/14   4. RV 48/14  5. PA 48/25/33   6. PCW 23/32/22   7. PA sat 57%   8. PCW sat not  obtained  9. Hgb 12 g/dL   10. Jaden CO 3.0   11. Jaden CI 1.7   12. TD CO 3.8   13. TD CI 2.1   14. PVR 2.7     CPST    MVO2, 12, hypotensive exercise response, chronotropic incompetence with paced rhythm, elevated VE/VCO2      Rosendo Beck M.D.  Professor, Cardiovascular Medicine and Surgery      Faculty Attestation  Rosendo Beck M.D.    I personally saw and examined this patient, reviewed recent laboratories and imaging studies, discussed the case with the housestaff, and conveyed plan to the patient.  I answered all questions from patient and/or family. I agree with the examination, assessment and plan outlined here.

## 2018-11-15 NOTE — PROGRESS NOTES
Prepped for RH procedure.  Has pain midsternally & across lower abdomen described as intermittent achiness.  CC:  fatigue.  No one with him today, but mother available should post-procedure transport be needed--he feels he would be too weak to drive himself at this point.  Consent being obtained.  H & P current.  Labs current.

## 2018-11-15 NOTE — MR AVS SNAPSHOT
After Visit Summary   11/15/2018    Ana Rosa Baldwin    MRN: [id number]           Patient Information     Date Of Birth          1965        Visit Information        Provider Department      11/15/2018 12:00 PM Dawna Sutton NP [facility]        Today's Diagnoses     Chronic systolic congestive heart failure (H)          Care Instructions    You were seen today in the Cardiovascular Clinic at the [facility].     Cardiology Providers you saw during your visit: Dawna Sutton NP     1. Please Stop taking spironolactone    2. Please reduce carvedilol to 6.25 mg twice daily    3. Please increase Lasix to 80 mg three times daily    4. Please return to [address] [place] [address] -- [facility] Main Entrance at 1 PM tomorrow (11/16/18) to be admitted. Check in at the admissions desk in the main lobby    5. In the meantime, if you have any concerns, contact the on call cardiologist or call 911      Results for ANA ROSA BALDWIN (MRN [id number]) as of 11/15/2018 12:18   Ref. Range 11/15/2018 09:55   Sodium Latest Ref Range: 133 - 144 mmol/L 127 (L)   Potassium Latest Ref Range: 3.4 - 5.3 mmol/L 4.7   Chloride Latest Ref Range: 94 - 109 mmol/L 94   Carbon Dioxide Latest Ref Range: 20 - 32 mmol/L 24   Urea Nitrogen Latest Ref Range: 7 - 30 mg/dL 43 (H)   Creatinine Latest Ref Range: 0.66 - 1.25 mg/dL 1.70 (H)   GFR Estimate Latest Ref Range: >60 mL/min/1.7m2 42 (L)   GFR Estimate If Black Latest Ref Range: >60 mL/min/1.7m2 51 (L)   Calcium Latest Ref Range: 8.5 - 10.1 mg/dL 9.1   Anion Gap Latest Ref Range: 3 - 14 mmol/L 9   Phosphorus Latest Ref Range: 2.5 - 4.5 mg/dL 3.4   Prealbumin Latest Ref Range: 15 - 45 mg/dL 29   Hemoglobin A1C Latest Ref Range: 0 - 5.6 % 6.4 (H)   Cholesterol Latest Ref Range: <200 mg/dL 194   CRP Inflammation Latest Ref Range: 0.0 - 8.0 mg/L 6.1   Ferritin Latest Ref Range: 26 - 388 ng/mL 1045 (H)   HDL Cholesterol Latest Ref Range: >39 mg/dL 42   Lactate  "Dehydrogenase Latest Ref Range: 85 - 227 U/L 165   LDL Cholesterol Calculated Latest Ref Range: <100 mg/dL 130 (H)   Non HDL Cholesterol Latest Ref Range: <130 mg/dL 152 (H)   N-Terminal Pro Bnp Latest Ref Range: 0 - 125 pg/mL 47737 (H)   PSA Latest Ref Range: 0 - 4 ug/L 0.38   Transferrin Latest Ref Range: 210 - 360 mg/dL 349   Triglycerides Latest Ref Range: <150 mg/dL 112   TSH Latest Ref Range: 0.40 - 4.00 mU/L 15.97 (H)   Uric Acid Latest Ref Range: 3.5 - 7.2 mg/dL 9.9 (H)   Vitamin B12 Latest Ref Range: 193 - 986 pg/mL 1857 (H)   Glucose Latest Ref Range: 70 - 99 mg/dL 128 (H)       Please limit your fluid intake to 2 L (64 ounces) daily.  2 Liters a day = 8.5 cups, or 64 ounces.  Please limit your salt intake to 2 grams a day or less.     If you gain 2# in 24 hours or 5# in one week call July Canales RN so we can adjust your medications as needed over the phone.     Please feel free to call me with any questions or concerns.       July Canales RN BSN CHFN  [facility]  Cardiology Care Coordinator-Heart Failure Clinic     Questions and [phone].   First press #1 for the [facility] and then press #3 for \"Medical Questions\" to reach us Cardiology Nurses.      On Call Cardiologist for after hours or on weekends: [phone]   option #4 and ask to speak to the on-call Cardiologist. Inform them you are a CORE/heart failure patient at the [facility].           If you need a medication refill please contact your pharmacy.  Please allow 3 business days for your refill to be completed.  _______________________________________________________  C.O.R.E. CLINIC Cardiomyopathy, Optimization, Rehabilitation, Education   The C.O.R.E. CLINIC is a heart failure specialty clinic within the [facility] where you will work with specialized nurse practitioners dedicated to helping patients with heart failure carefully adjust medications, receive " education, and learn who and when to call if symptoms develop. They specialize in helping you better understand your condition, slow the progression of your disease, improve the length and quality of your life, help you detect future heart problems before they become life threatening, and avoid hospitalizations.  As always, thank you for trusting us with your health care needs!           Follow-ups after your visit        Your next 10 appointments already scheduled     Nov 19, 2018 11:00 AM CST   XR CHEST 2 VIEWS with XR1   [facility] Xray ([facility])    [address] [place] [id number] [phone]           How do I prepare for my exam? (Food and drink instructions) No Food and Drink Restrictions.  How do I prepare for my exam? (Other instructions) You do not need to do anything special for this exam.  What should I wear: Wear comfortable clothes.  How long does the exam take: Most scans take less than 5 minutes.  What should I bring: Bring a list of your medicines, including vitamins, minerals and over-the-counter drugs. It is safest to leave personal items at home.  Do I need a :  No  is needed.  What do I need to tell my doctor: Tell your doctor if there s any chance you are pregnant.  What should I do after the exam: No restrictions, You may resume normal activities.  What is this test: An image of a specific body part shown in shades of black and white.  Who should I call with questions: If you have any questions, please call the Imaging Department where you will have your exam. Directions, parking instructions, and other information is available on our website, [email or web address] [email or web address].            Nov 19, 2018 11:30 AM CST   US ABDOMEN COMPLETE with UCUS1   [facility] US ([facility])    [address] [id number] [phone]           How do I prepare for my  exam? (Food and drink instructions) Adults: No eating, smoking, gum chewing or drinking for 8 hours before the exam. You may take medicine with a small sip of water.  Children: * Infants, breast-fed: may have breast milk up to 2 hours before exam. * Infants, formula: may have bottle until 4 hours before exam. * Children 1-5 years: No food or drink for 4 hours before exam. * Children 6 -12 years: No food or drink for 6 hours before exam. * Children over 12 years: No food or drink for 8 hours before exam.  * J Tube Fed: No need to stop feedings.  What should I wear: Wear comfortable clothes.  How long does the exam take: Most ultrasounds take 30 to 60 minutes.  What should I bring: Bring a list of your medicines, including vitamins, minerals and over-the-counter drugs. It is safest to leave personal items at home.  Do I need a :  No  is needed.  What do I need to tell my doctor: Tell your doctor about any allergies you may have.  What should I do after the exam: No restrictions, You may resume normal activities.  What is this test: An ultrasound uses sound waves to make pictures of the body. Sound waves do not cause pain. The only discomfort may be the pressure of the wand against your skin or full bladder.  Who should I call with questions: If you have any questions, please call the Imaging Department where you will have your exam. Directions, parking instructions, and other information is available on our website, [email or web address].            Nov 19, 2018  1:30 PM CST   (Arrive by 1:15 PM)   SOT SOCIAL WORK EVAL with LUKE Morrison   [facility] Transplant ([facility] [place])    [address]  [address]  [facility] [place] [facility] [id number] [phone]            Nov 19, 2018  3:20 PM CST   CT HEAD W/O CONTRAST with UCCT1   [facility] [place] [facility] ([facility])    [address]  1st Floor  [facility] [id number] [phone]            How do I prepare for my exam? (Food and drink instructions) No Food and Drink Restrictions.  How do I prepare for my exam? (Other instructions) You do not need to do anything special to prepare for this exam. For a sinus scan: Use your nose spray (nasal decongestant spray) as directed.  What should I wear: Please wear loose clothing, such as a sweat suit or jogging clothes. Avoid snaps, zippers and other metal. We may ask you to undress and put on a hospital gown.  How long does the exam take: Most scans take less than 20 minutes.  What should I bring: Please bring any scans or X-rays taken at other hospitals, if similar tests were done. Also bring a list of your medicines, including vitamins, minerals and over-the-counter drugs. It is safest to leave personal items at home.  Do I need a : No  is needed.  What do I need to tell my doctor? Be sure to tell your doctor: * If you have any allergies. * If there s any chance you are pregnant. * If you are breastfeeding.  What should I do after the exam: No restrictions, You may resume normal activities.  What is this test: A CT (computed tomography) scan is a series of pictures that allows us to look inside your body. The scanner creates images of the body in cross sections, much like slices of bread. This helps us see any problems more clearly.  Who should I call with questions: If you have any questions, please call the Imaging Department where you will have your exam. Directions, parking instructions, and other information is available on our website, [email or web address].            Nov 19, 2018  3:40 PM CST   CT ABDOMEN WO & W CHEST PELVIS W CONTRAST with UCCT1   [facility] CT ([facility])    [address]  1st [address]  [facility] [address] [phone]           How do I prepare for my exam? (Food and drink instructions) To prepare: Do not eat or drink for 2 hours before your exam. If you need to  take medicine, you may take it with small sips of water. (We may ask you to take liquid medicine as well.)  How do I prepare for my exam? (Other instructions) Please arrive 30 minutes early for your CT.  Once in the department you might be asked to drink water 15-20 minutes prior to your exam.  If indicated you may be asked to drink an oral contrast in advance of your CT.  If this is the case, the imaging team will let you know or be in contact with you prior to your appointment  Patients over 70 or patients with diabetes or kidney problems: If you haven t had a blood test (creatinine test) within the last 30 days, the Cardiologist/Radiologist may require you to get this test prior to your exam.  If you have diabetes:  Continue to take your metformin medication on the day of your exam  What should I wear: Please wear loose clothing, such as a sweat suit or jogging clothes. Avoid snaps, zippers and other metal. We may ask you to undress and put on a hospital gown.  How long does the exam take: Most scans take less than 20 minutes.  What should I bring: Please bring any scans or X-rays taken at other hospitals, if similar tests were done. Also bring a list of your medicines, including vitamins, minerals and over-the-counter drugs. It is safest to leave personal items at home.  Do I need a : No  is needed.  What do I need to tell my doctor? Be sure to tell your doctor: * If you have any allergies. * If there s any chance you are pregnant. * If you are breastfeeding.  What should I do after the exam: No restrictions, You may resume normal activities.  What is this test: A CT (computed tomography) scan is a series of pictures that allows us to look inside your body. The scanner creates images of the body in cross sections, much like slices of bread. This helps us see any problems more clearly. You may receive contrast (X-ray dye) before or during your scan. You will be asked to drink the contrast.  Who should  I call with questions: If you have any questions, please call the Imaging Department where you will have your exam. Directions, parking instructions, and other information is available on our website, [email or web address].            Nov 19, 2018  4:00 PM CST   (Arrive by 3:45 PM)   New Patient Visit with Ben White MD   [facility] ([facility])    [address]  [address]  [place] [place] [id number] [phone]            Nov 21, 2018  8:00 AM CST   (Arrive by 7:45 AM)   New Patient Visit with Polo Leigh MD   [facility] ([facility])    [address]  [address]  [place] [id number] [phone]            Nov 29, 2018 12:30 PM CST   (Arrive by 12:15 PM)   New Patient Visit with Bettina Giron PsyD   [facility] Neuropsychology ([facility])    [address]  3rd Floor  [place] [id number] [phone]              Future tests that were ordered for you today     Open Standing Orders        Priority Remaining Interval Expires Ordered    Glucose Whole Blood POCT Routine 1/1 CONDITIONAL X 1  11/15/2018    Glucose Whole Blood POCT Routine 1/1 CONDITIONAL X 1  11/15/2018          Open Future Orders        Priority Expected Expires Ordered    Heart Cath Right heart cath Routine 11/15/2018 11/15/2019 11/15/2018            Who to contact     If you have questions or need follow up information about today's clinic visit or your schedule please contact [facility] directly at [phone].  Normal or non-critical lab and imaging results will be communicated to you by MyChart, letter or phone within 4 business days after the clinic has received the results. If you do not hear from us within 7 days, please contact the clinic through MyChart or phone. If you have a critical or abnormal lab result, we will notify you by phone as soon as possible.  Submit refill requests  "through [employer] or call your pharmacy and they will forward the refill request to us. Please allow 3 business days for your refill to be completed.          Additional Information About Your Visit        [employer] Information     [employer] gives you secure access to your electronic health record. If you see a primary care provider, you can also send messages to your care team and make appointments. If you have questions, please call your primary care clinic.  If you do not have a primary care provider, please call [phone] and they will assist you.        Care EveryWhere ID     This is your Care EveryWhere ID. This could be used by other organizations to access your [facility] medical records  [id number]        Your Vitals Were     Pulse Height Pulse Oximetry BMI (Body Mass Index)          63 1.727 m (5' 8\") 97% 20.36 kg/m2         Blood Pressure from Last 3 Encounters:   11/15/18 102/77   11/15/18 95/70   11/02/18 91/60    Weight from Last 3 Encounters:   11/15/18 60.3 kg (133 lb)   11/15/18 60.7 kg (133 lb 14.4 oz)   11/02/18 65.8 kg (145 lb)              We Performed the Following     CARDIOVASCULAR CORE CLINIC REFERRAL        Primary Care Provider Office Phone # Fax #    [facility] [phone]       [address] [address]        Equal Access to Services     BELIA GOLDBERG : Hadii aad ku hadasho Soomaali, waaxda luqadaha, qaybta kaalmada adeegyada, dean topete. So [facility] [phone].    ATENCIÓN: Si habla español, tiene a anaya disposición servicios gratuitos de asistencia lingüística. Jad al [phone].    We comply with applicable federal civil rights laws and [place] laws. We do not discriminate on the basis of race, color, national origin, age, disability, sex, sexual orientation, or gender identity.            Thank you!     Thank you for choosing [facility]  for your care. Our goal is always to provide you with " excellent care. Hearing back from our patients is one way we can continue to improve our services. Please take a few minutes to complete the written survey that you may receive in the mail after your visit with us. Thank you!             Your Updated Medication List - Protect others around you: Learn how to safely use, store and throw away your medicines at www.disposemymeds.org.          This list is accurate as of 11/15/18  2:39 PM.  Always use your most recent med list.                   Brand Name Dispense Instructions for use Diagnosis    albuterol 108 (90 Base) MCG/ACT inhaler    PROAIR HFA/PROVENTIL HFA/VENTOLIN HFA     Inhale 2 puffs into the lungs every 6 hours as needed for shortness of breath / dyspnea or wheezing    Intermittent asthma without complication, unspecified asthma severity       amiodarone 200 MG tablet    PACERONE/CODARONE     Take 1 tablet (200 mg) by mouth daily    Paroxysmal atrial fibrillation (H)       ASACOL  MG EC tablet   Generic drug:  mesalamine      Take 3 tablets (2,400 mg) by mouth 2 times daily    Ulcerative colitis with complication, unspecified location (H)       azelastine 0.1 % nasal spray    ASTELIN     Spray 2 sprays into both nostrils 2 times daily    Chronic rhinitis       BEANO PO      Take by mouth as needed        carvedilol 12.5 MG tablet    COREG     Take 6.25 mg by mouth 2 times daily (with meals)    Chronic systolic heart failure (H)       CYMBALTA 30 MG EC capsule   Generic drug:  DULoxetine      Take 1 capsule (30 mg) by mouth daily    Depression, unspecified depression type       FLOMAX 0.4 MG capsule   Generic drug:  tamsulosin      Take 1 capsule (0.4 mg) by mouth daily (with dinner)    Benign prostatic hyperplasia, unspecified whether lower urinary tract symptoms present       furosemide 80 MG tablet    LASIX     Take 80 mg by mouth 3 times daily    Chronic systolic heart failure (H)       levothyroxine 50 MCG tablet    SYNTHROID/LEVOTHROID     Take 1  tablet (50 mcg) by mouth daily    Hypothyroidism due to medication       MIRALAX Packet   Generic drug:  polyethylene glycol      Take 17 g by mouth 4 times daily    Constipation, unspecified constipation type       omeprazole 40 MG capsule    priLOSEC     Take 1 capsule (40 mg) by mouth daily In pm    Nausea       ondansetron 4 MG ODT tab    ZOFRAN-ODT     Take 1 tablet (4 mg) by mouth every 8 hours as needed for nausea    Nausea       potassium chloride SA 20 MEQ CR tablet    K-DUR/KLOR-CON M    180 tablet    Take 2 tablets (40 mEq) by mouth daily    Hypokalemia       Simethicone 125 MG Caps      Take 1 capsule by mouth 4 times daily    Nausea       SINGULAIR 10 MG tablet   Generic drug:  montelukast      Take 1 tablet (10 mg) by mouth At Bedtime    Intermittent asthma without complication, unspecified asthma severity       spironolactone 25 MG tablet    ALDACTONE     Take 1 tablet (25 mg) by mouth daily    Chronic systolic heart failure (H)       warfarin 5 MG tablet    COUMADIN     Take 2.5 mg 2 dys/wk and 5 mg all others    Paroxysmal atrial fibrillation (H)

## 2018-11-15 NOTE — NURSING NOTE
"Met with patient to present the HM3 and HW VADs as possible treatment option.     Discussed patient and caregiver responsibilities before and after VAD implant.  Clarified the need for a caregiver to be present for education and to assist patient for at least first 30 days after a patient returns home. Patient does not have a designated caregiver at this time.  Encouraged patient to think about caregiver options over the next few days.  Patient stated that he was concerned that he would not be able to come up with a caregiver.     Discussed basic overview of VAD equipment, on going management, need for anticoagulation, regular dressing change, grounded three-pronged outlet and functioning telephone. Also discussed frequency of follow-up clinic appointments and the need to stay locally for at least 2-4 weeks.  Reviewed restrictions of having a VAD such as no swimming, bathing, boats, MRI's, or arc welding.     Provided and discussed the VAD educational brochures, information regarding the VAD and transplant support groups, information on INTERMACs registry, and \"VAD What You Should Know\" with additional details. Patient signed \"VAD What You Should Know\" document. VAD coordinator contact information provided.  Encouraged patient to call with questions.       "

## 2018-11-15 NOTE — PROGRESS NOTES
Outpatient MNT: Heart Transplant/VAD Evaluation    Current BMI: 20.4 (HT 68 in,  lbs/61 kg)  BMI is within criteria of <35 for heart transplant     Time Spent: 30 minutes  Visit Type: Initial  Referring Physician: Dr Beck  Pt accompanied by: self    History of previous txp: none    Nutrition Assessment  Pt reports reading labels and trying to follow a low sodium diet, <2000 mg/day. He does not add salt when he cooks. He reports being more strict with sodium intake since last hospitalization, 8/2018. He reports vomiting 1-2x/week. The only thing he can correlate this to is sweet foods and even his toothpaste, requiring him to trial other toothpaste. He has tried Ensure before, but reports this is too sweet. He reports abdominal edema (sounds like ascites) for several years, which no one can figure out. This is affecting his appetite, PO, etc. He has lost significant amount of weight over the years and continues to drop weight.     Appetite: poor x several years from bloating / ascites     Vitamins, Supplements, Pertinent Meds: calcium + vit D  Herbal Medicines/Supplements: none     Diet Recall  Breakfast May have 1-2 eggs with 1-2 pieces toast and unsalted butter   Lunch 1/2 PB s/w or waits until early dinner    Dinner 1 slice pizza or 1/2 sandwich    Snacks Grapes    Beverages water   Alcohol None x 1 month, previously would drink 2-3 drinks 2-3x/week (max of 9 drinks/week)   Dining out Once every 10 days      Physical Activity  Limited d/t low energy for several years  He reports most recently having to stop physical activities at work within the past month d/t fatigue      Anthropometrics  Height:   68 in   BMI:    20.4    Weight Status:Normal BMI   Weight:  133 lbs            IBW (lb): 154  % IBW: 86    Wt Hx: Weight loss of ~30 lbs x 1 month - fluid and dry weight loss. Pt reports his highest weight was 190 lbs in 2013 (higher than he liked d/t working overnight shifts). Current weight is his lowest  weight.     Adj/dosing BW: 133 lbs/61 kg       Labs  Recent Labs   Lab Test  11/15/18   0955   CHOL  194   HDL  42   LDL  130*   TRIG  112       Malnutrition  % Intake: <75% for >/= 3 months (non-severe malnutrition)  % Weight Loss: difficult to assess w/ combination of fluid and dry weight losses   Subcutaneous Fat Loss: Mild/Moderate  Muscle Loss: Moderate  Fluid Accumulation/Edema: Moderate   Malnutrition Diagnosis: Non-Severe malnutrition in the context of chronic illness     Estimated Nutrition Needs  Energy  [id number]     (30-35 kcal/kg for increased needs)   Protein  49-61+    (0.8-1+ g/kg for maintenance)           Fluid  Pending MD   Micronutrient   Na+: <2000 mg/day              Nutrition Diagnosis  Unintended weight loss r/t poor appetite, bloating 2/2 abdominal edema/ascites AEB pt report, weight loss of 30 lbs x 1 month (fluid and dry weight loss) with current weight being his lowest weight as an adult.     Food and nutrition related knowledge deficit r/t pre heart transplant eval AEB pt verbalized not hearing pre/post transplant diet guidelines.    Nutrition Intervention  Nutrition education provided:  Discussed sodium intake (low sodium foods and drinks, seasoning food without salt and tips for low sodium diet). Difficult to assess current intake, but based on pt reported portion sizes, his sodium intake likely does not exceed 2000 mg/day. Encouraged pt to avoid over restricting foods based on sodium (within moderation) d/t poor appetite and intake. Did discuss likelihood of eating smaller, more frequent meals during the day such as grazing, yet pt reports it is hard to force food when he feels sick and portion size is already minimal. Discussed trying a non sweet protein supplement, such as unflavored protein powder or a savory protein powder (although higher in sodium).     Discussed importance of weight maintenance and ideal to not continue to drop weight and increased surgical risk if weight were  to continue decreasing. Pt voices understanding of this. Unfortunately, his ascites type symptoms in abdomen seem to be large barrier to eating adequate and weight stability. We briefly discussed option of having a feeding tube placed IF he were to continue dropping weight and not tolerating adequate PO.     Reviewed post txp diet guidelines in brief (will review in further detail post txp):  (1) Review of proper food safety measures d/t immunosuppressant therapy post-op and increased risk for food-borne illness    (2) Avoid the following post txp d/t risk for rejection, unknown effects on the organs, and/or potential interactions with immunosuppressants:  - Herbal, Chinese, holistic, chiropractic, natural, alternative medicines and supplements  - Detoxes and cleanses  - Weight loss pills  - Protein powders or other products with extracts or herbs (ie green tea extract)    (3) Med regimen and possible side effects    Reviewed post VAD nutrition:  High likelihood of early satiety postop with placement of VAD, requiring small/frequent meals. Discussed need for high protein intake for healing and how adequate protein intake may be impacted by early satiety.     Patient Understanding: Pt verbalized understanding of education provided.  Expected Compliance: Fair  Follow-Up Plans: PRN     Nutrition Goals  1. Weight maintenance >/= 133 lbs  2. Pt to verbalize understanding of 3 aspects of post txp education provided     Provided pt with contact info.   Ronda Erazo RD, LD  Pgr [phone]

## 2018-11-15 NOTE — PROCEDURES
FINAL CARDIAC CATH REPORT:     PROCEDURES PERFORMED:   Right Heart Catheterization    PHYSICIANS:  Attending Physician: Isiah Mcallister MD  Interventional Cardiology Fellow: None  Cardiology Fellow: Santino Haque MD    INDICATION:  Everton Larios is a 53 year old male with NICM who presents upon request of outpatient clinic for evaluation of likely decompensated heart failure.    DESCRIPTION:  1. Consent obtained with discussion of risks.  All questions were answered.  2. Sterile prep and procedure.  3. Location with Sheaths:   Rt IJ  7 Fr 10 cm [short]  4. Access: Local anesthetic with lidocaine.  A standard 18 guage needle with ultrasound guidance was used to establish vascular access using a modified Seldinger technique.  5. Diagnostic Catheters:   7 Fr  Adkins Liz  6. Guiding Catheters:  None  6. Estimated blood loss: < 5 ml    MEDICATIONS:  Heart rate, BP, respiration, oxygen saturation and patient responses were monitored throughout the procedure with the assistance of the RN under my supervision.    Procedures:    HEMODYNAMICS:  BSA 1.7 m2  1. HR 60 bpm  2. /74/85 mmHg  3. RA 14/13/12   4. RV 57/12  5. PA 57/23/37   6. PCW 28/31/28   7. PA sat 52%   8. PCW sat 94%  9. Hgb 16.7 g/dL   10. Jaden CO 2.0   11. Jaden CI 1.2   12. TD CO 2.9   13. TD CI 1.5  14. PVR 4.6     Sheath Removal:  Sheath sutured in place, swan locked at 55 cm    Contrast: Isovue, 0 ml     Fluoroscopy Time: 0.6 min    COMPLICATIONS:  1. None    SUMMARY:   >> Elevated biventricular filling pressures, primarily post-capillary moderate pulmonary hypertension with moderately reduced cardiac output and index as assessed by Thermodilution and Jaden.    PLAN:   >> At request of outpatient provider, Dr. Beck, sheath was secured in place and patient admitted to the ICU for management of decompensated heart failure.    The attending interventional cardiologist was present and supervised all critical aspects the procedure.    Findings discussed  with patient/inpatient team and Dr. Beck at end of case.    See CVIS report for final draft.    Santino Haque MD  Cardiology Fellow      Staff Cardiologist: I supervised the cardiology fellow and reviewed the hemodynamic findings with the fellow at the completion of the procedure.  I agree with the documentation above.    Isiah Mcallister MD

## 2018-11-15 NOTE — LETTER
Transition Communication Hand-off for Care Transitions to Next Level of Care Provider    Name: Everton Larios  : 1965  MRN #: [id number]  Primary Care Provider: KALYN GARCIA     Primary Clinic: [address] [address]     Reason for Hospitalization:  cardiac issues  Heart failure, chronic, with acute decompensation (H)  Admit Date/Time: 11/15/2018  2:39 PM  Discharge Date: 18  Payor Source: Payor: TOBIN / Plan: [employer] BLUE / Product Type: PPO /       Key Recommendations:    Pt was given a healthcare directive to complete if he needed it.  Pt was given information about SW services in clinic if he has questions about completing the HCD while at home.    Thanks,    LUKE Stover, APSW  [address]   Phone: [phone]  Pager: [phone]  Unit: [phone]

## 2018-11-15 NOTE — IP AVS SNAPSHOT
[address] [address]    Phone:  [phone]                                       After Visit Summary   11/15/2018    Everton Larios    MRN: [id number]           After Visit Summary Signature Page     I have received my discharge instructions, and my questions have been answered. I have discussed any challenges I see with this plan with the nurse or doctor.    ..........................................................................................................................................  Patient/Patient Representative Signature      ..........................................................................................................................................  Patient Representative Print Name and Relationship to Patient    ..................................................               ................................................  Date                                   Time    ..........................................................................................................................................  Reviewed by Signature/Title    ...................................................              ..............................................  Date                                               Time          22EPIC Rev 08/18

## 2018-11-15 NOTE — MR AVS SNAPSHOT
After Visit Summary   11/15/2018    Everton Larios    MRN: [id number]           Patient Information     Date Of Birth          1965        Visit Information        Provider Department      11/15/2018 1:30 PM Mali Erazo RD M [facility] Solid Organ Transplant        Today's Diagnoses     Chronic systolic congestive heart failure (H)           Follow-ups after your visit        Your next 10 appointments already scheduled     Nov 15, 2018  4:30 PM CST   FULL PULMONARY FUNCTION with  PFL D   [facility] Pulmonary Function Testing ([facility])    [address] [place] [address]  [address] [facility] [address]   [phone]            Nov 19, 2018 11:00 AM CST   XR CHEST 2 VIEWS with XR1   [facility] ([facility])    [address] [place] [id number] [phone]           How do I prepare for my exam? (Food and drink instructions) No Food and Drink Restrictions.  How do I prepare for my exam? (Other instructions) You do not need to do anything special for this exam.  What should I wear: Wear comfortable clothes.  How long does the exam take: Most scans take less than 5 minutes.  What should I bring: Bring a list of your medicines, including vitamins, minerals and over-the-counter drugs. It is safest to leave personal items at home.  Do I need a :  No  is needed.  What do I need to tell my doctor: Tell your doctor if there s any chance you are pregnant.  What should I do after the exam: No restrictions, You may resume normal activities.  What is this test: An image of a specific body part shown in shades of black and white.  Who should I call with questions: If you have any questions, please call the Imaging Department where you will have your exam. Directions, parking instructions, and other information is available on our website, [email or web address].            Nov 19, 2018 11:30 AM CST    US ABDOMEN COMPLETE with UCUS1   [facility] Imaging Center US ([facility])    [address]  1st [address]  [facility] [address] [phone]           How do I prepare for my exam? (Food and drink instructions) Adults: No eating, smoking, gum chewing or drinking for 8 hours before the exam. You may take medicine with a small sip of water.  Children: * Infants, breast-fed: may have breast milk up to 2 hours before exam. * Infants, formula: may have bottle until 4 hours before exam. * Children 1-5 years: No food or drink for 4 hours before exam. * Children 6 -12 years: No food or drink for 6 hours before exam. * Children over 12 years: No food or drink for 8 hours before exam.  * J Tube Fed: No need to stop feedings.  What should I wear: Wear comfortable clothes.  How long does the exam take: Most ultrasounds take 30 to 60 minutes.  What should I bring: Bring a list of your medicines, including vitamins, minerals and over-the-counter drugs. It is safest to leave personal items at home.  Do I need a :  No  is needed.  What do I need to tell my doctor: Tell your doctor about any allergies you may have.  What should I do after the exam: No restrictions, You may resume normal activities.  What is this test: An ultrasound uses sound waves to make pictures of the body. Sound waves do not cause pain. The only discomfort may be the pressure of the wand against your skin or full bladder.  Who should I call with questions: If you have any questions, please call the Imaging Department where you will have your exam. Directions, parking instructions, and other information is available on our website, [email or web address].            Nov 19, 2018  1:30 PM CST   (Arrive by 1:15 PM)   SOT SOCIAL WORK EVAL with LUKE Morrison   [facility] Solid Organ Transplant ([facility])    [address]  [address]  [facility] [address] [phone]             Nov 19, 2018  3:20 PM CST   CT HEAD W/O CONTRAST with UCCT1   [facility] CT ([facility])    [address]  [address] [facility] [address] [phone]           How do I prepare for my exam? (Food and drink instructions) No Food and Drink Restrictions.  How do I prepare for my exam? (Other instructions) You do not need to do anything special to prepare for this exam. For a sinus scan: Use your nose spray (nasal decongestant spray) as directed.  What should I wear: Please wear loose clothing, such as a sweat suit or jogging clothes. Avoid snaps, zippers and other metal. We may ask you to undress and put on a hospital gown.  How long does the exam take: Most scans take less than 20 minutes.  What should I bring: Please bring any scans or X-rays taken at other hospitals, if similar tests were done. Also bring a list of your medicines, including vitamins, minerals and over-the-counter drugs. It is safest to leave personal items at home.  Do I need a : No  is needed.  What do I need to tell my doctor? Be sure to tell your doctor: * If you have any allergies. * If there s any chance you are pregnant. * If you are breastfeeding.  What should I do after the exam: No restrictions, You may resume normal activities.  What is this test: A CT (computed tomography) scan is a series of pictures that allows us to look inside your body. The scanner creates images of the body in cross sections, much like slices of bread. This helps us see any problems more clearly.  Who should I call with questions: If you have any questions, please call the Imaging Department where you will have your exam. Directions, parking instructions, and other information is available on our website, [email or web address].            Nov 19, 2018  3:40 PM CST   CT ABDOMEN WO & W CHEST PELVIS W CONTRAST with UCCT1   [facility] CT ([facility])    [address] [place] [address]  [address]  [address]st [facility] [address] [phone]           How do I prepare for my exam? (Food and drink instructions) To prepare: Do not eat or drink for 2 hours before your exam. If you need to take medicine, you may take it with small sips of water. (We may ask you to take liquid medicine as well.)  How do I prepare for my exam? (Other instructions) Please arrive 30 minutes early for your CT.  Once in the department you might be asked to drink water 15-20 minutes prior to your exam.  If indicated you may be asked to drink an oral contrast in advance of your CT.  If this is the case, the imaging team will let you know or be in contact with you prior to your appointment  Patients over 70 or patients with diabetes or kidney problems: If you haven t had a blood test (creatinine test) within the last 30 days, the Cardiologist/Radiologist may require you to get this test prior to your exam.  If you have diabetes:  Continue to take your metformin medication on the day of your exam  What should I wear: Please wear loose clothing, such as a sweat suit or jogging clothes. Avoid snaps, zippers and other metal. We may ask you to undress and put on a hospital gown.  How long does the exam take: Most scans take less than 20 minutes.  What should I bring: Please bring any scans or X-rays taken at other hospitals, if similar tests were done. Also bring a list of your medicines, including vitamins, minerals and over-the-counter drugs. It is safest to leave personal items at home.  Do I need a : No  is needed.  What do I need to tell my doctor? Be sure to tell your doctor: * If you have any allergies. * If there s any chance you are pregnant. * If you are breastfeeding.  What should I do after the exam: No restrictions, You may resume normal activities.  What is this test: A CT (computed tomography) scan is a series of pictures that allows us to look inside your body. The scanner creates images of the  body in cross sections, much like slices of bread. This helps us see any problems more clearly. You may receive contrast (X-ray dye) before or during your scan. You will be asked to drink the contrast.  Who should I call with questions: If you have any questions, please call the Imaging Department where you will have your exam. Directions, parking instructions, and other information is available on our website, [email or web address].            Nov 19, 2018  4:00 PM CST   (Arrive by 3:45 PM)   New Patient Visit with Ben White MD   [facility] ([facility])    [address]  [address]  [facility] [address]   [phone]            Nov 21, 2018  8:00 AM CST   (Arrive by 7:45 AM)   New Patient Visit with Polo Leigh MD   [facility] ([facility])    [address]  [address]  [place] [facility] [address] [phone]            Nov 29, 2018 12:30 PM CST   (Arrive by 12:15 PM)   New Patient Visit with Bettina Giron PsyD   [facility] Neuropsychology ([facility])    [address]  3rd Floor  [facility] [id number] [phone]              Future tests that were ordered for you today     Open Standing Orders        Priority Remaining Interval Expires Ordered    Glucose Whole Blood POCT Routine 1/1 CONDITIONAL X 1  11/15/2018    Glucose Whole Blood POCT Routine 1/1 CONDITIONAL X 1  11/15/2018          Open Future Orders        Priority Expected Expires Ordered    Heart Cath Right heart cath Routine 11/15/2018 11/15/2019 11/15/2018            Who to contact     If you have questions or need follow up information about today's clinic visit or your schedule please contact [facility] directly at [phone].  Normal or non-critical lab and imaging results will be communicated to you by MyChart, letter or phone within 4 business days after the clinic has received  the results. If you do not hear from us within 7 days, please contact the clinic through [employer] or phone. If you have a critical or abnormal lab result, we will notify you by phone as soon as possible.  Submit refill requests through [employer] or call your pharmacy and they will forward the refill request to us. Please allow 3 business days for your refill to be completed.          Additional Information About Your Visit        [employer] Information     [employer] gives you secure access to your electronic health record. If you see a primary care provider, you can also send messages to your care team and make appointments. If you have questions, please call your primary care clinic.  If you do not have a primary care provider, please call [phone] and they will assist you.        Care EveryWhere ID     This is your Care EveryWhere ID. This could be used by other organizations to access your [facility] medical records  [id number]         Blood Pressure from Last 3 Encounters:   11/15/18 97/70   11/15/18 95/70   11/02/18 91/60    Weight from Last 3 Encounters:   11/15/18 60.3 kg (133 lb)   11/15/18 60.7 kg (133 lb 14.4 oz)   11/02/18 65.8 kg (145 lb)              We Performed the Following     NUTRITION REFERRAL        Primary Care Provider Office Phone # Fax #    Fredrick J Skyedinesh [phone]       [address] [address]        Equal Access to Services     BELIA GOLDBERG : Hadii aaron ku hadasho Soomaali, waaxda luqadaha, qaybta kaalmada ademary anneyada, dean rayo . So [facility] [phone].    ATENCIÓN: Si habla español, tiene a anaya disposición servicios gratuitos de asistencia lingüística. Llame al [phone].    We comply with applicable federal civil rights laws and [place] laws. We do not discriminate on the basis of race, color, national origin, age, disability, sex, sexual orientation, or gender identity.            Thank you!     Thank you for  choosing [facility]  for your care. Our goal is always to provide you with excellent care. Hearing back from our patients is one way we can continue to improve our services. Please take a few minutes to complete the written survey that you may receive in the mail after your visit with us. Thank you!             Your Updated Medication List - Protect others around you: Learn how to safely use, store and throw away your medicines at www.disposemymeds.org.          This list is accurate as of 11/15/18  2:39 PM.  Always use your most recent med list.                   Brand Name Dispense Instructions for use Diagnosis    albuterol 108 (90 Base) MCG/ACT inhaler    PROAIR HFA/PROVENTIL HFA/VENTOLIN HFA     Inhale 2 puffs into the lungs every 6 hours as needed for shortness of breath / dyspnea or wheezing    Intermittent asthma without complication, unspecified asthma severity       amiodarone 200 MG tablet    PACERONE/CODARONE     Take 1 tablet (200 mg) by mouth daily    Paroxysmal atrial fibrillation (H)       ASACOL  MG EC tablet   Generic drug:  mesalamine      Take 3 tablets (2,400 mg) by mouth 2 times daily    Ulcerative colitis with complication, unspecified location (H)       azelastine 0.1 % nasal spray    ASTELIN     Spray 2 sprays into both nostrils 2 times daily    Chronic rhinitis       BEANO PO      Take by mouth as needed        carvedilol 12.5 MG tablet    COREG     Take 6.25 mg by mouth 2 times daily (with meals)    Chronic systolic heart failure (H)       CYMBALTA 30 MG EC capsule   Generic drug:  DULoxetine      Take 1 capsule (30 mg) by mouth daily    Depression, unspecified depression type       FLOMAX 0.4 MG capsule   Generic drug:  tamsulosin      Take 1 capsule (0.4 mg) by mouth daily (with dinner)    Benign prostatic hyperplasia, unspecified whether lower urinary tract symptoms present       furosemide 80 MG tablet    LASIX     Take 80 mg by mouth 3 times daily    Chronic  systolic heart failure (H)       levothyroxine 50 MCG tablet    SYNTHROID/LEVOTHROID     Take 1 tablet (50 mcg) by mouth daily    Hypothyroidism due to medication       MIRALAX Packet   Generic drug:  polyethylene glycol      Take 17 g by mouth 4 times daily    Constipation, unspecified constipation type       omeprazole 40 MG capsule    priLOSEC     Take 1 capsule (40 mg) by mouth daily In pm    Nausea       ondansetron 4 MG ODT tab    ZOFRAN-ODT     Take 1 tablet (4 mg) by mouth every 8 hours as needed for nausea    Nausea       potassium chloride SA 20 MEQ CR tablet    K-DUR/KLOR-CON M    180 tablet    Take 2 tablets (40 mEq) by mouth daily    Hypokalemia       Simethicone 125 MG Caps      Take 1 capsule by mouth 4 times daily    Nausea       SINGULAIR 10 MG tablet   Generic drug:  montelukast      Take 1 tablet (10 mg) by mouth At Bedtime    Intermittent asthma without complication, unspecified asthma severity       spironolactone 25 MG tablet    ALDACTONE     Take 1 tablet (25 mg) by mouth daily    Chronic systolic heart failure (H)       warfarin 5 MG tablet    COUMADIN     Take 2.5 mg 2 dys/wk and 5 mg all others    Paroxysmal atrial fibrillation (H)

## 2018-11-15 NOTE — IP AVS SNAPSHOT
MRN:[id number]                      After Visit Summary   11/15/2018    Everton Larios    MRN: [id number]           Thank you!     Thank you for choosing [facility] for your care. Our goal is always to provide you with excellent care. Hearing back from our patients is one way we can continue to improve our services. Please take a few minutes to complete the written survey that you may receive in the mail after you visit with us. Thank you!        Patient Information     Date Of Birth          1965        Designated Caregiver       Most Recent Value    Caregiver    Will someone help with your care after discharge? yes    Name of designated caregiver Verito Larios    Phone number of caregiver [phone]    Caregiver address n/a      About your hospital stay     You were admitted on:  November 15, 2018 You last received care in the:  [address] [facility]    You were discharged on:  November 23, 2018        Reason for your hospital stay       Decompensated heart failure                  Who to Call     For medical emergencies, please call 911.  For non-urgent questions about your medical care, please call your primary care provider or clinic, [phone]          Attending Provider     Provider Specialty    Rosendo Beck MD Cardiology    Jose Alfredo Young MD Internal Medicine       Primary Care Provider Office Phone # Fax #    Fredrick J New [phone]      After Care Instructions     Diet       Follow this diet upon discharge: Cardiac                  Follow-up Appointments     Adult [facility] Follow-up and recommended labs and tests       Follow up with core clinic as scheduled    Appointments on [place] and/or [facility] (with [facility] or [facility] provider or service). Call [phone] if you haven't heard regarding these appointments within 7 days of discharge.            Follow Up and recommended labs and tests       BMP and magnesium within one week with CORE  follow up.                  Your next 10 appointments already scheduled     Nov 28, 2018  4:00 PM CST   Lab with  LAB    Health Lab ([facility])    [address]  1st Floor  [facility] [id number] [phone]            Nov 28, 2018  4:30 PM CST   (Arrive by 4:15 PM)   CORE RETURN with My Norton NP   [facility] ([facility])    [address]  [address]  [facility] [id number] [phone]            Nov 29, 2018 12:30 PM CST   (Arrive by 12:15 PM)   New Patient Visit with Bettina Giron PsyD   [facility] ([facility])    [address]  3rd Floor  [facility] [id number] [phone]              Additional Services     Home infusion referral       [facility]  Phone # [phone]  Fax # [phone]     Provide IV Milrinone and supplies.  RN to assess vital signs and weight, respiratory and cardiac status, pain level and activity tolerance, incision for signs/symptoms of infection, hydration, nutrition and bowel status and home safety.  RN to teach administration of IV medications and medication management.  RN to provide line care per agency protocol and lab draws as ordered.  IV Access Type: PICC                  Future tests that were ordered for you     CT Angiogram congenital heart disease                 General Recommendations To Control Heart Failure When You Get Home     Heart Failure Instructions for Patients and Families: Please read and check off each of these important instructions as you do them when you get home.     Weight and Symptoms    ___ Put a scale in your bathroom.    ___ Post a weight chart or calendar next to your scale.    ___ Weigh yourself everyday as soon as you get up in the morning (before breakfast).  You should only be wearing your pajamas.  Write your weight on the chart/calendar.    ___ Bring your weight chart/calendar with  you to all appointments.    ___ Call your doctor or nurse practitioner if you gain 2 pounds (in 1 day) or 5 pounds in (1 week) from your goal  good  weight.  Your good weight is also called your  dry  weight.  Your doctor or nurse will tell you what your good weight should be.    ___ Call your doctor or nurse practitioner if you have shortness of breath that gets worse over time, leg swelling or fatigue.    Medications and Diet    ___ Make sure to take your medication as prescribed.    ___ Bring a current list of your medication and all of your medicine bottles with you to all appointments.    ___ Limit fluids if you still have swelling or shortness of breath, or if your doctor tells you to do so.      ___ Eat less than 2000 mg of sodium (salt) every day. Read food labels, and do not add salt to meals. Remember, if you eat less salt you retain less fluid.    ___ Follow a heart healthy diet that is low in saturated fat.         Activity and Suggested Lifestyle Changes   ___ Stay active. Talk to your doctor about an exercise program that is safe for your heart.    ___ Stop smoking. Reduce alcohol use.      ___ Lose weight if you are overweight. Extra weight puts a lot of stress on the heart.    Control for Leg Swelling  ___ Keep your legs elevated (raised) as needed for swelling. If swelling is uncomfortable or elevation doesn t help, ask your doctor about using ACE wraps or support stockings.      What is the C.O.R.E. Clinic?     Cardiomyopathy  Optimization  Rehabilitation  Education    The C.O.R.E. Clinic is a heart failure specialty clinic within [facility].  It is an outpatient disease management program that is based on a phase-by-phase approach, which is tailored to each patient s individual needs.  The cardiologist, nurse practitioner, physician assistant and nurses provide an ongoing outpatient care and treatment plan that guides heart failure and cardiomyopathy patients from evaluation and education  "to stabilization. This team works with your current primary care doctor and cardiologist to help you:      Avoid hospitalizations    Slow the progression of the disease    Improve length and quality of life    Know who and when to call if heart failure symptoms appear    Receive easy access to quality health care and advice    Better understand your condition and treatment    Decrease the tremendous cost burden of heart failure care    Detect future heart problems before they become life threatening    Your C.O.R.E. Clinic Team will continue to educate you on your heart failure and may adjust medications based on your vital signs, lab work, and how you are feeling.  Therefore, it is very important to bring the following to all C.O.R.E. appointments:    - An accurate list of your medications  - Your medicine bottles  - Your weight chart/calendar    [facility]):    [phone]  [facility] ([place]):    [phone]  [facility] ([place]):  [phone]        Pending Results     No orders found from 11/13/2018 to 11/16/2018.            Statement of Approval     Ordered          11/23/18 1221  I have reviewed and agree with all the recommendations and orders detailed in this document.  EFFECTIVE NOW     Approved and electronically signed by:  Luis Carlos Templeton MD             Admission Information     Date & Time Provider Department Dept. Phone    11/15/2018 Jose Alfredo Young MD [address] [facility] [phone]      Your Vitals Were     Blood Pressure Pulse Temperature Respirations Height Weight    92/53 (BP Location: Left arm) 79 97.8  F (36.6  C) (Oral) 16 1.727 m (5' 8\") 58.7 kg (129 lb 6.4 oz)    Pulse Oximetry BMI (Body Mass Index)                99% 19.68 kg/m2          MyChart Information     [facility] gives you secure access to your electronic health record. If you see a primary care provider, you can also send messages to your care team and " make appointments. If you have questions, please call your primary care clinic.  If you do not have a primary care provider, please call [phone] and they will assist you.        Care EveryWhere ID     This is your Care EveryWhere ID. This could be used by other organizations to access your [facility] medical records  [id number]        Equal Access to Services     BELIA GOLDBERG : Hadii aaron caro Soparvez, waaxda josiadaha, qaybta kaalmachen fuentes, dean jaimejamesrachel rayo . So [facility] [phone].    ATENCIÓN: Si habla español, tiene a anaya disposición servicios gratuitos de asistencia lingüística. Jad al [phone].    We comply with applicable federal civil rights laws and [place] laws. We do not discriminate on the basis of race, color, national origin, age, disability, sex, sexual orientation, or gender identity.               Review of your medicines      START taking        Dose / Directions    BETA BLOCKER NOT PRESCRIBED (INTENTIONAL)   Used for:  Chronic systolic congestive heart failure (H)        Beta Blocker not prescribed intentionally due to Recent tx with IV positive inotropic agent   Refills:  0       milrinone 20-5 MG/100ML-% infusion   Commonly known as:  PRIMACOR   Used for:  Chronic systolic congestive heart failure (H)        Dose:  0.125 mcg/kg/min   Inject 6.6375 mcg/min into the vein continuous   Quantity:  100 mL   Refills:  3         CONTINUE these medicines which may have CHANGED, or have new prescriptions. If we are uncertain of the size of tablets/capsules you have at home, strength may be listed as something that might have changed.        Dose / Directions    furosemide 20 MG tablet   Commonly known as:  LASIX   This may have changed:    - medication strength  - how much to take   Used for:  Chronic systolic congestive heart failure (H)        Dose:  20 mg   Take 1 tablet (20 mg) by mouth 2 times daily   Quantity:  30 tablet   Refills:  1       potassium  chloride SA 20 MEQ CR tablet   Commonly known as:  K-DUR/KLOR-CON M   This may have changed:    - how much to take  - when to take this   Used for:  Hypokalemia        Dose:  40 mEq   Take 2 tablets (40 mEq) by mouth daily   Quantity:  180 tablet   Refills:  3         CONTINUE these medicines which have NOT CHANGED        Dose / Directions    albuterol 108 (90 Base) MCG/ACT inhaler   Commonly known as:  PROAIR HFA/PROVENTIL HFA/VENTOLIN HFA   Used for:  Intermittent asthma without complication, unspecified asthma severity        Dose:  2 puff   Inhale 2 puffs into the lungs every 6 hours as needed for shortness of breath / dyspnea or wheezing   Refills:  0       amiodarone 200 MG tablet   Commonly known as:  PACERONE/CODARONE   Used for:  Paroxysmal atrial fibrillation (H)        Dose:  200 mg   Take 1 tablet (200 mg) by mouth daily   Refills:  0       ASACOL  MG EC tablet   Used for:  Ulcerative colitis with complication, unspecified location (H)   Generic drug:  mesalamine        Dose:  2400 mg   Take 3 tablets (2,400 mg) by mouth 2 times daily   Refills:  0       azelastine 0.1 % nasal spray   Commonly known as:  ASTELIN   Used for:  Chronic rhinitis        Dose:  2 spray   Spray 2 sprays into both nostrils 2 times daily   Refills:  0       BEANO PO        Dose:  2 tablet   Take 2 tablets by mouth 3 times daily   Refills:  0       DULoxetine 20 MG capsule   Commonly known as:  CYMBALTA        Dose:  20 mg   Take 20 mg by mouth daily   Refills:  0       FLOMAX 0.4 MG capsule   Used for:  Benign prostatic hyperplasia, unspecified whether lower urinary tract symptoms present   Generic drug:  tamsulosin        Dose:  0.4 mg   Take 1 capsule (0.4 mg) by mouth daily (with dinner)   Refills:  0       fluticasone 220 MCG/ACT inhaler   Commonly known as:  FLOVENT HFA        Dose:  2 puff   Inhale 2 puffs into the lungs 2 times daily   Refills:  0       levothyroxine 88 MCG tablet   Commonly known as:   SYNTHROID/LEVOTHROID        Dose:  88 mcg   Take 88 mcg by mouth daily   Refills:  0       omeprazole 40 MG capsule   Commonly known as:  priLOSEC   Used for:  Nausea        Dose:  40 mg   Take 1 capsule (40 mg) by mouth daily In pm   Refills:  0       ondansetron 4 MG ODT tab   Commonly known as:  ZOFRAN-ODT   Used for:  Nausea        Dose:  4 mg   Take 1 tablet (4 mg) by mouth every 8 hours as needed for nausea   Refills:  0       SINGULAIR 10 MG tablet   Used for:  Intermittent asthma without complication, unspecified asthma severity   Generic drug:  montelukast        Dose:  10 mg   Take 1 tablet (10 mg) by mouth At Bedtime   Refills:  0       warfarin 5 MG tablet   Commonly known as:  COUMADIN   Used for:  Paroxysmal atrial fibrillation (H)        Take 2.5 mg on Mon/Wed/Fri and 5 mg on all other days of the week   Refills:  0         STOP taking     carvedilol 3.125 MG tablet   Commonly known as:  COREG           spironolactone 50 MG tablet   Commonly known as:  ALDACTONE                Where to get your medicines      Some of these will need a paper prescription and others can be bought over the counter. Ask your nurse if you have questions.     Bring a paper prescription for each of these medications     furosemide 20 MG tablet    milrinone 20-5 MG/100ML-% infusion       You don't need a prescription for these medications     BETA BLOCKER NOT PRESCRIBED (INTENTIONAL)                Protect others around you: Learn how to safely use, store and throw away your medicines at www.disposemymeds.org.             Medication List: This is a list of all your medications and when to take them. Check marks below indicate your daily home schedule. Keep this list as a reference.      Medications           Morning Afternoon Evening Bedtime As Needed    albuterol 108 (90 Base) MCG/ACT inhaler   Commonly known as:  PROAIR HFA/PROVENTIL HFA/VENTOLIN HFA   Inhale 2 puffs into the lungs every 6 hours as needed for shortness of  breath / dyspnea or wheezing   Next Dose Due:  Anytime, if needed                                   amiodarone 200 MG tablet   Commonly known as:  PACERONE/CODARONE   Take 1 tablet (200 mg) by mouth daily   Last time this was given:  200 mg on 11/23/2018  9:05 AM   Next Dose Due:  Tomorrow am                                   ASACOL  MG EC tablet   Take 3 tablets (2,400 mg) by mouth 2 times daily   Last time this was given:  2,400 mg on 11/23/2018  9:06 AM   Generic drug:  mesalamine   Next Dose Due:  Tonight 8pm                                      azelastine 0.1 % nasal spray   Commonly known as:  ASTELIN   Spray 2 sprays into both nostrils 2 times daily   Last time this was given:  2 sprays on 11/23/2018  9:05 AM   Next Dose Due:  Tomorrow am                                   BEANO PO   Take 2 tablets by mouth 3 times daily   Next Dose Due:  Today 8pm                                         BETA BLOCKER NOT PRESCRIBED (INTENTIONAL)   Beta Blocker not prescribed intentionally due to Recent tx with IV positive inotropic agent                                DULoxetine 20 MG capsule   Commonly known as:  CYMBALTA   Take 20 mg by mouth daily   Last time this was given:  20 mg on 11/23/2018  9:06 AM   Next Dose Due:  Tomorrow am                                   FLOMAX 0.4 MG capsule   Take 1 capsule (0.4 mg) by mouth daily (with dinner)   Last time this was given:  0.4 mg on 11/22/2018  5:03 PM   Generic drug:  tamsulosin   Next Dose Due:  Today 5pm                                   fluticasone 220 MCG/ACT inhaler   Commonly known as:  FLOVENT HFA   Inhale 2 puffs into the lungs 2 times daily   Next Dose Due:  Tonight 8pm                                      furosemide 20 MG tablet   Commonly known as:  LASIX   Take 1 tablet (20 mg) by mouth 2 times daily   Last time this was given:  20 mg on 11/23/2018  9:05 AM   Next Dose Due:  Today 8pm                                      levothyroxine 88 MCG tablet    Commonly known as:  SYNTHROID/LEVOTHROID   Take 88 mcg by mouth daily   Last time this was given:  88 mcg on 11/23/2018  9:05 AM   Next Dose Due:  Tomorrow am                                   milrinone 20-5 MG/100ML-% infusion   Commonly known as:  PRIMACOR   Inject 6.6375 mcg/min into the vein continuous   Last time this was given:  0.125 mcg/kg/min on 11/23/2018  9:08 AM                                omeprazole 40 MG capsule   Commonly known as:  priLOSEC   Take 1 capsule (40 mg) by mouth daily In pm   Last time this was given:  40 mg on 11/23/2018  9:05 AM   Next Dose Due:  Today 8pm                                   ondansetron 4 MG ODT tab   Commonly known as:  ZOFRAN-ODT   Take 1 tablet (4 mg) by mouth every 8 hours as needed for nausea   Last time this was given:  4 mg on 11/19/2018  3:30 PM   Next Dose Due:  Anytime, if needed                                   potassium chloride SA 20 MEQ CR tablet   Commonly known as:  K-DUR/KLOR-CON M   Take 2 tablets (40 mEq) by mouth daily   Last time this was given:  20 mEq on 11/23/2018 12:35 AM   Next Dose Due:  Tomorrow am                                   SINGULAIR 10 MG tablet   Take 1 tablet (10 mg) by mouth At Bedtime   Last time this was given:  10 mg on 11/22/2018  7:22 PM   Generic drug:  montelukast   Next Dose Due:  Tonight 8pm                                   warfarin 5 MG tablet   Commonly known as:  COUMADIN   Take 2.5 mg on Mon/Wed/Fri and 5 mg on all other days of the week   Last time this was given:  2.5 mg on 11/22/2018  7:16 PM   Next Dose Due:  Today 6pm

## 2018-11-15 NOTE — PATIENT INSTRUCTIONS
You were seen today in the Cardiovascular Clinic at the [facility].     Cardiology Providers you saw during your visit: Dawna Sutton NP     1. Please Stop taking spironolactone    2. Please reduce carvedilol to 6.25 mg twice daily    3. Please increase Lasix to 80 mg three times daily    4. Please return to [address] [place] [address] -- Hospital Main Entrance at 1 PM tomorrow (11/16/18) to be admitted. Check in at the admissions desk in the main lobby    5. In the meantime, if you have any concerns, contact the on call cardiologist or call 911      Results for ANA ROSA BALDWIN (MRN [id number]) as of 11/15/2018 12:18   Ref. Range 11/15/2018 09:55   Sodium Latest Ref Range: 133 - 144 mmol/L 127 (L)   Potassium Latest Ref Range: 3.4 - 5.3 mmol/L 4.7   Chloride Latest Ref Range: 94 - 109 mmol/L 94   Carbon Dioxide Latest Ref Range: 20 - 32 mmol/L 24   Urea Nitrogen Latest Ref Range: 7 - 30 mg/dL 43 (H)   Creatinine Latest Ref Range: 0.66 - 1.25 mg/dL 1.70 (H)   GFR Estimate Latest Ref Range: >60 mL/min/1.7m2 42 (L)   GFR Estimate If Black Latest Ref Range: >60 mL/min/1.7m2 51 (L)   Calcium Latest Ref Range: 8.5 - 10.1 mg/dL 9.1   Anion Gap Latest Ref Range: 3 - 14 mmol/L 9   Phosphorus Latest Ref Range: 2.5 - 4.5 mg/dL 3.4   Prealbumin Latest Ref Range: 15 - 45 mg/dL 29   Hemoglobin A1C Latest Ref Range: 0 - 5.6 % 6.4 (H)   Cholesterol Latest Ref Range: <200 mg/dL 194   CRP Inflammation Latest Ref Range: 0.0 - 8.0 mg/L 6.1   Ferritin Latest Ref Range: 26 - 388 ng/mL 1045 (H)   HDL Cholesterol Latest Ref Range: >39 mg/dL 42   Lactate Dehydrogenase Latest Ref Range: 85 - 227 U/L 165   LDL Cholesterol Calculated Latest Ref Range: <100 mg/dL 130 (H)   Non HDL Cholesterol Latest Ref Range: <130 mg/dL 152 (H)   N-Terminal Pro Bnp Latest Ref Range: 0 - 125 pg/mL 35539 (H)   PSA Latest Ref Range: 0 - 4 ug/L 0.38   Transferrin Latest Ref Range: 210 - 360 mg/dL 349   Triglycerides Latest Ref Range: <150 mg/dL 112   TSH  "Latest Ref Range: 0.40 - 4.00 mU/L 15.97 (H)   Uric Acid Latest Ref Range: 3.5 - 7.2 mg/dL 9.9 (H)   Vitamin B12 Latest Ref Range: 193 - 986 pg/mL 1857 (H)   Glucose Latest Ref Range: 70 - 99 mg/dL 128 (H)       Please limit your fluid intake to 2 L (64 ounces) daily.  2 Liters a day = 8.5 cups, or 64 ounces.  Please limit your salt intake to 2 grams a day or less.     If you gain 2# in 24 hours or 5# in one week call July Canales RN so we can adjust your medications as needed over the phone.     Please feel free to call me with any questions or concerns.       July Canales RN BSN CHFN  [facility]  Cardiology Care Coordinator-Heart Failure Clinic     Questions and [phone].   First press #1 for the University and then press #3 for \"Medical Questions\" to reach us Cardiology Nurses.      On Call Cardiologist for after hours or on weekends: [phone]   option #4 and ask to speak to the on-call Cardiologist. Inform them you are a CORE/heart failure patient at the [facility].           If you need a medication refill please contact your pharmacy.  Please allow 3 business days for your refill to be completed.  _______________________________________________________  C.O.R.E. CLINIC Cardiomyopathy, Optimization, Rehabilitation, Education   The C.O.R.E. CLINIC is a heart failure specialty clinic within the [facility] where you will work with specialized nurse practitioners dedicated to helping patients with heart failure carefully adjust medications, receive education, and learn who and when to call if symptoms develop. They specialize in helping you better understand your condition, slow the progression of your disease, improve the length and quality of your life, help you detect future heart problems before they become life threatening, and avoid hospitalizations.  As always, thank you for trusting us with your health care needs!   "

## 2018-11-15 NOTE — IP AVS SNAPSHOT
"    [address] [facility]: [phone]                                              INTERAGENCY TRANSFER FORM - PHYSICIAN ORDERS   11/15/2018                    Hospital Admission Date: 11/15/2018  ANA ROSA BALDWIN   : 1965  Sex: Male        Attending Provider: Jose Alfredo Young MD     Allergies:  Lisinopril, Codeine    Infection:  None   Service:  CARDIAC CATH    Ht:  1.727 m (5' 8\")   Wt:  58.7 kg (129 lb 6.4 oz)   Admission Wt:  60.3 kg (133 lb)    BMI:  19.68 kg/m 2   BSA:  1.68 m 2            Patient PCP Information     Provider PCP Type    KALYN TYRONE [facility]      ED Clinical Impression     Diagnosis Description Comment Added By Time Added    Chronic systolic congestive heart failure (H) [I50.22] Chronic systolic congestive heart failure (H) [I50.22]  Epic, Userprod 11/15/2018  2:40 PM    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] 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]  Luis Carlos Templeton MD 2018  8:46 PM      Hospital Problems as of 2018              Priority Class Noted POA    Heart failure, chronic, with acute decompensation (H) Medium  11/15/2018 Yes      Non-Hospital Problems as of 2018              Priority Class Noted    H/O congenital atrial septal defect (ASD) repair at age 3 Medium  10/4/2018    Nonischemic cardiomyopathy (H) Medium  10/4/2018    Paroxysmal atrial fibrillation (H) Medium  10/4/2018    Ventricular tachycardia (H) Medium  10/4/2018    On amiodarone therapy Medium  10/4/2018    Ulcerative colitis (H) Medium  10/4/2018    ICD, [place] scientific ; gen change 2018 Medium  10/4/2018    S/P ablation of atrial fibrillation Medium  10/4/2018    Hypothyroidism due to medication Medium  10/4/2018    Chronic systolic heart failure (H) Medium  10/4/2018    DJD (degenerative joint disease) - neck Medium  10/4/2018    Pierce's esophagus Medium  10/4/2018    Intermittent asthma without complication Medium  10/4/2018    Chronic rhinitis Medium  " 10/4/2018    Depression Medium  10/4/2018    Iron deficiency anemia Medium  10/18/2018      Code Status History     Date Active Date Inactive Code Status Order ID Comments User Context    11/15/2018  3:02 PM 11/15/2018  7:37 PM Full Code [id number]  July Canales, RN Inpatient    11/15/2018  2:45 PM 11/15/2018  3:02 PM Full Code [id number]  Fernie Gama PA-C Inpatient    11/2/2018 10:38 AM 11/2/2018  2:30 PM Full Code [id number]  Duong Rodrigues RN Inpatient    10/22/2018 12:44 PM 10/22/2018 12:44 PM Full Code [id number]  Duong Rodrigues RN Inpatient    10/22/2018 12:44 PM 10/22/2018  3:37 PM Full Code [id number]  Duong Rodrigues RN Inpatient         Medication Review      START taking        Dose / Directions Comments    milrinone 20-5 MG/100ML-% infusion   Commonly known as:  PRIMACOR   Used for:  Chronic systolic congestive heart failure (H)        Dose:  0.125 mcg/kg/min   Inject 6.6375 mcg/min into the vein continuous   Quantity:  100 mL   Refills:  3          CONTINUE these medications which may have CHANGED, or have new prescriptions. If we are uncertain of the size of tablets/capsules you have at home, strength may be listed as something that might have changed.        Dose / Directions Comments    furosemide 20 MG tablet   Commonly known as:  LASIX   This may have changed:    - medication strength  - how much to take   Used for:  Chronic systolic congestive heart failure (H)        Dose:  20 mg   Take 1 tablet (20 mg) by mouth 2 times daily   Quantity:  30 tablet   Refills:  1        potassium chloride SA 20 MEQ CR tablet   Commonly known as:  K-DUR/KLOR-CON M   This may have changed:    - how much to take  - when to take this   Used for:  Hypokalemia        Dose:  40 mEq   Take 2 tablets (40 mEq) by mouth daily   Quantity:  180 tablet   Refills:  3          CONTINUE these medications which have NOT CHANGED        Dose / Directions Comments    albuterol 108 (90 Base) MCG/ACT inhaler   Commonly known  as:  PROAIR HFA/PROVENTIL HFA/VENTOLIN HFA   Used for:  Intermittent asthma without complication, unspecified asthma severity        Dose:  2 puff   Inhale 2 puffs into the lungs every 6 hours as needed for shortness of breath / dyspnea or wheezing   Refills:  0        amiodarone 200 MG tablet   Commonly known as:  PACERONE/CODARONE   Used for:  Paroxysmal atrial fibrillation (H)        Dose:  200 mg   Take 1 tablet (200 mg) by mouth daily   Refills:  0        ASACOL  MG EC tablet   Used for:  Ulcerative colitis with complication, unspecified location (H)   Generic drug:  mesalamine        Dose:  2400 mg   Take 3 tablets (2,400 mg) by mouth 2 times daily   Refills:  0        azelastine 0.1 % nasal spray   Commonly known as:  ASTELIN   Used for:  Chronic rhinitis        Dose:  2 spray   Spray 2 sprays into both nostrils 2 times daily   Refills:  0        BEANO PO        Dose:  2 tablet   Take 2 tablets by mouth 3 times daily   Refills:  0        carvedilol 3.125 MG tablet   Commonly known as:  COREG        Dose:  12.5 mg   Take 12.5 mg by mouth 2 times daily   Refills:  0        DULoxetine 20 MG capsule   Commonly known as:  CYMBALTA        Dose:  20 mg   Take 20 mg by mouth daily   Refills:  0        FLOMAX 0.4 MG capsule   Used for:  Benign prostatic hyperplasia, unspecified whether lower urinary tract symptoms present   Generic drug:  tamsulosin        Dose:  0.4 mg   Take 1 capsule (0.4 mg) by mouth daily (with dinner)   Refills:  0        fluticasone 220 MCG/ACT inhaler   Commonly known as:  FLOVENT HFA        Dose:  2 puff   Inhale 2 puffs into the lungs 2 times daily   Refills:  0        levothyroxine 88 MCG tablet   Commonly known as:  SYNTHROID/LEVOTHROID        Dose:  88 mcg   Take 88 mcg by mouth daily   Refills:  0        omeprazole 40 MG capsule   Commonly known as:  priLOSEC   Used for:  Nausea        Dose:  40 mg   Take 1 capsule (40 mg) by mouth daily In pm   Refills:  0        ondansetron 4 MG  ODT tab   Commonly known as:  ZOFRAN-ODT   Used for:  Nausea        Dose:  4 mg   Take 1 tablet (4 mg) by mouth every 8 hours as needed for nausea   Refills:  0        SINGULAIR 10 MG tablet   Used for:  Intermittent asthma without complication, unspecified asthma severity   Generic drug:  montelukast        Dose:  10 mg   Take 1 tablet (10 mg) by mouth At Bedtime   Refills:  0        warfarin 5 MG tablet   Commonly known as:  COUMADIN   Used for:  Paroxysmal atrial fibrillation (H)        Take 2.5 mg on Mon/Wed/Fri and 5 mg on all other days of the week   Refills:  0          STOP taking     spironolactone 50 MG tablet   Commonly known as:  ALDACTONE                   Summary of Visit     Reason for your hospital stay       Decompensated heart failure             After Care     Diet       Follow this diet upon discharge: Cardiac             Referrals     Home infusion referral       [facility]  Phone # [phone]  Fax # [phone]     Provide IV Milrinone and supplies.  RN to assess vital signs and weight, respiratory and cardiac status, pain level and activity tolerance, incision for signs/symptoms of infection, hydration, nutrition and bowel status and home safety.  RN to teach administration of IV medications and medication management.  RN to provide line care per agency protocol and lab draws as ordered.  IV Access Type: PICC             Radiology & Cardiology Orders     Future Labs/Procedures Complete By Expires    CT Angiogram congenital heart disease  11/23/2018 (Approximate) 11/22/2019    Process Instructions:    Administration of IV contrast (contrast agent, dose, and amount) will be tailored to this examination per the appropriate written protocol listed in the Protocol E-Book, or by the supervising imaging provider.       Radiology & Cardiology Orders     CT Angiogram congenital heart disease       Administration of IV contrast (contrast agent, dose, and amount) will be tailored to this  examination per the appropriate written protocol listed in the Protocol E-Book, or by the supervising imaging provider.              Your next 10 appointments already scheduled     Nov 28, 2018  4:00 PM CST   Lab with [facility]   [facility] ([facility])    [address]  1st Floor  [place] [address]   [phone]            Nov 28, 2018  4:30 PM CST   (Arrive by 4:15 PM)   CORE RETURN with My Norton NP   [facility] ([facility])    [address]  [address]  [place] [address]   [phone]            Nov 29, 2018 12:30 PM CST   (Arrive by 12:15 PM)   New Patient Visit with Bettina Giron PsyD   [facility] ([facility])    [address]  3rd Floor  [place] [address] [phone]              Follow-Up Appointment Instructions     Future Labs/Procedures    Adult [facility] Follow-up and recommended labs and tests     Comments:    Follow up with core clinic as scheduled    Appointments on [place] and/or [facility] (with [facility] or [facility] provider or service). Call [phone] if you haven't heard regarding these appointments within 7 days of discharge.    Follow Up and recommended labs and tests     Comments:    BMP and magnesium within one week with CORE follow up.      Follow-Up Appointment Instructions     Adult [facility] Follow-up and recommended labs and tests       Follow up with core clinic as scheduled    Appointments on [place] and/or [facility] (with [facility] or [facility] provider or service). Call [phone] if you haven't heard regarding these appointments within 7 days of discharge.       Follow Up and recommended labs and tests       BMP and magnesium within one week with CORE follow up.             Statement of Approval     Ordered          11/23/18 1221  I have reviewed and agree with all the recommendations and orders detailed in this document.   EFFECTIVE NOW     Approved and electronically signed by:  Luis Carlos Templeton MD

## 2018-11-15 NOTE — NURSING NOTE
Vitals completed successfully and medication reconciled. Cecilia Alvarado MA  Chief Complaint   Patient presents with     New Patient     NEW CORE: 53 year old male presents with systolic HF, EF 16% referred by Dr. Beck to establish care with labs prior

## 2018-11-15 NOTE — PROGRESS NOTES
HPI  Mr. Larios is a 53 year old man with a history of atrial fibrillation and atrial fibrillation ablation, nonischemic cardiomyopathy (EF 15%), congenital ASD repair in childhood, s/p AICD (2008, generator change 2018), hypothyroidism, and remote history of GI bleed and splenic embolization who was seen by Dr. Beck 1 month ago who has since completed a right heart catheterization showing elevated right and left sided filling pressures and marginal cardiac index (details below). Iron replacement has since been ordered and some pacing changes were made. Mr. Larios subsequently called in to clinic feeling worse and his lower rate settings were lowered to previous setting of 60 bpm. In addition, he's completed a cardiopulmonary stress test showing a peak VO2 of 12, did not achieve anaerobic threshold, VE/VCO2 slope elevated to 42.46. An LVAD/transplant evaluation has been initiated.    Mr. Larios reports feeling fatigued. He is short of breath with walking less than a block. He is comfortable walking room to room at home. Occasional shortness of breath with ADLs. No orthopnea or PND. Endorses chest pressure with exertion and relieved with rest. No palpitations. More lightheadedness. In fact, he was unwell at work today and had to leave work early. No ankle edema. Intermittent bloating. Appetite is poor and early satiety. Nausea after exerting himself with band like pain across his mid-abdomen.     15 pound weight loss on our scale over the last month. Attributes some of it to fluid loss with recent escalation of his diuretics.     PMH  Past Medical History:   Diagnosis Date     Pierce's esophagus 10/4/2018     Chronic rhinitis 10/4/2018     Chronic systolic heart failure (H) 10/4/2018     Depression 10/4/2018     DJD (degenerative joint disease) - neck 10/4/2018     H/O congenital atrial septal defect (ASD) repair at age 3 10/4/2018     Hypothyroidism due to medication 10/4/2018     ICD, [place] scientific 2008;  gen change 2/2018 10/4/2018     Intermittent asthma without complication 10/4/2018     Nonischemic cardiomyopathy (H) 10/4/2018     On amiodarone therapy 10/4/2018     Paroxysmal atrial fibrillation (H) 10/4/2018     S/P ablation of atrial fibrillation 10/4/2018     Ulcerative colitis (H) 10/4/2018     Ventricular tachycardia (H) 10/4/2018     Social History     Social History     Marital status: Single     Spouse name: N/A     Number of children: N/A     Years of education: N/A     Occupational History     Not on file.     Social History Main Topics     Smoking status: Former Smoker     Years: 10.00     Smokeless tobacco: Never Used     Alcohol use 0.6 oz/week     1 Cans of beer per week      Comment: a couple times a week      Drug use: No     Sexual activity: Not on file     Other Topics Concern     Not on file     Social History Narrative     No family history on file.    MEDICATIONS   Current Outpatient Prescriptions on File Prior to Visit:  albuterol (PROAIR HFA/PROVENTIL HFA/VENTOLIN HFA) 108 (90 Base) MCG/ACT inhaler Inhale 2 puffs into the lungs every 6 hours as needed for shortness of breath / dyspnea or wheezing   amiodarone (PACERONE/CODARONE) 200 MG tablet Take 1 tablet (200 mg) by mouth daily   azelastine (ASTELIN) 0.1 % nasal spray Spray 2 sprays into both nostrils 2 times daily   carvedilol (COREG) 12.5 MG tablet Take 1 tablet (12.5 mg) by mouth 2 times daily (with meals)   DULoxetine (CYMBALTA) 30 MG EC capsule Take 1 capsule (30 mg) by mouth daily   furosemide (LASIX) 80 MG tablet Take one tab (80 mg) in am and 1/2 tab (40 mg) in pm   levothyroxine (SYNTHROID/LEVOTHROID) 50 MCG tablet Take 1 tablet (50 mcg) by mouth daily   mesalamine (ASACOL HD) 800 MG EC tablet Take 3 tablets (2,400 mg) by mouth 2 times daily   montelukast (SINGULAIR) 10 MG tablet Take 1 tablet (10 mg) by mouth At Bedtime   omeprazole (PRILOSEC) 40 MG capsule Take 1 capsule (40 mg) by mouth daily In pm   ondansetron (ZOFRAN-ODT)  "4 MG ODT tab Take 1 tablet (4 mg) by mouth every 8 hours as needed for nausea   polyethylene glycol (MIRALAX) Packet Take 17 g by mouth 4 times daily   potassium chloride SA (K-DUR/KLOR-CON M) 20 MEQ CR tablet Take 2 tablets (40 mEq) by mouth daily   spironolactone (ALDACTONE) 25 MG tablet Take 1 tablet (25 mg) by mouth daily   tamsulosin (FLOMAX) 0.4 MG capsule Take 1 capsule (0.4 mg) by mouth daily (with dinner)   warfarin (COUMADIN) 5 MG tablet Take 2.5 mg 2 dys/wk and 5 mg all others   Simethicone 125 MG CAPS Take 1 capsule by mouth 4 times daily     No current facility-administered medications on file prior to visit.     Physical examination:  BP 95/70 (BP Location: Right arm, Patient Position: Chair, Cuff Size: Child)  Pulse 63  Ht 1.727 m (5' 8\")  Wt 60.7 kg (133 lb 14.4 oz)  SpO2 97%  BMI 20.36 kg/m2  GENERAL APPEARANCE: chronically ill appearing male, pale in color, alert and no distress  HEENT: no icterus, no xanthelasmas, normal pupil size and reaction, normal palate, mucosa moist, no cyanosis.  NECK: no adenopathy, no asymmetry, masses, or scars  CHEST: lungs clear to auscultation - no rales, rhonchi or wheezes, no use of accessory muscles, no retractions, respirations are unlabored, normal respiratory rate, no kyphosis, no scoliosis  CARDIOVASCULAR: regular rhythm, normal S1 with physiologic split S2, no S3 or S4 and no murmur, click or rub, precordium quiet with normal PMI. JVP elevated to the jaw with +HJR  ABDOMEN: soft, non tender, without hepatomegaly  EXTREMITIES: no clubbing, cyanosis or edema  NEURO: alert and oriented to person/place/time, normal speech, gait and affect  SKIN: no ecchymoses, no rashes    LABS  Last Comprehensive Metabolic Panel:  Sodium   Date Value Ref Range Status   11/15/2018 127 (L) 133 - 144 mmol/L Final     Potassium   Date Value Ref Range Status   11/15/2018 4.7 3.4 - 5.3 mmol/L Final     Chloride   Date Value Ref Range Status   11/15/2018 94 94 - 109 mmol/L Final "     Carbon Dioxide   Date Value Ref Range Status   11/15/2018 24 20 - 32 mmol/L Final     Anion Gap   Date Value Ref Range Status   11/15/2018 9 3 - 14 mmol/L Final     Glucose   Date Value Ref Range Status   11/15/2018 128 (H) 70 - 99 mg/dL Final     Urea Nitrogen   Date Value Ref Range Status   11/15/2018 43 (H) 7 - 30 mg/dL Final     Creatinine   Date Value Ref Range Status   11/15/2018 1.70 (H) 0.66 - 1.25 mg/dL Final     GFR Estimate   Date Value Ref Range Status   11/15/2018 42 (L) >60 mL/min/1.7m2 Final     Comment:     Non  GFR Calc     Calcium   Date Value Ref Range Status   11/15/2018 9.1 8.5 - 10.1 mg/dL Final         DATA  RHC 11/5/18      MEST 10/22/18    RER 0.84  VE/VCO2 slope 42.46    Assessment and Plan  Mr. Larios is a 53 year old man with advanced heart failure currently undergoing evaluation for advanced therapies. He has numerous features of advanced illness. He is volume up despite his weight loss. He is hyponatremic with declining renal function. We've arranged for him to have a RHC today and likely admission for presumed cardiogenic shock.     35 minutes spent in direct care, >50% in counseling

## 2018-11-15 NOTE — LETTER
11/15/2018      RE: Everton Larios  [address] [facility] [id number]       Dear Colleague,    Thank you for the opportunity to participate in the care of your patient, Everton Larios, at the [facility] HEART [facility] at [facility]. Please see a copy of my visit note below.    HPI  Mr. Larios is a 53 year old man with a history of atrial fibrillation and atrial fibrillation ablation, nonischemic cardiomyopathy (EF 15%), congenital ASD repair in childhood, s/p AICD (2008, generator change 2018), hypothyroidism, and remote history of GI bleed and splenic embolization who was seen by Dr. Beck 1 month ago who has since completed a right heart catheterization showing elevated right and left sided filling pressures and marginal cardiac index (details below). Iron replacement has since been ordered and some pacing changes were made. Mr. Larios subsequently called in to clinic feeling worse and his lower rate settings were lowered to previous setting of 60 bpm. In addition, he's completed a cardiopulmonary stress test showing a peak VO2 of 12, did not achieve anaerobic threshold, VE/VCO2 slope elevated to 42.46. An LVAD/transplant evaluation has been initiated.    Mr. Larios reports feeling fatigued. He is short of breath with walking less than a block. He is comfortable walking room to room at home. Occasional shortness of breath with ADLs. No orthopnea or PND. Endorses chest pressure with exertion and relieved with rest. No palpitations. More lightheadedness. In fact, he was unwell at work today and had to leave work early. No ankle edema. Intermittent bloating. Appetite is poor and early satiety. Nausea after exerting himself with band like pain across his mid-abdomen.     15 pound weight loss on our scale over the last month. Attributes some of it to fluid loss with recent escalation of his diuretics.     PMH  Past Medical History:   Diagnosis Date     Pierce's  esophagus 10/4/2018     Chronic rhinitis 10/4/2018     Chronic systolic heart failure (H) 10/4/2018     Depression 10/4/2018     DJD (degenerative joint disease) - neck 10/4/2018     H/O congenital atrial septal defect (ASD) repair at age 3 10/4/2018     Hypothyroidism due to medication 10/4/2018     ICD, [employer] 2008; gen change 2/2018 10/4/2018     Intermittent asthma without complication 10/4/2018     Nonischemic cardiomyopathy (H) 10/4/2018     On amiodarone therapy 10/4/2018     Paroxysmal atrial fibrillation (H) 10/4/2018     S/P ablation of atrial fibrillation 10/4/2018     Ulcerative colitis (H) 10/4/2018     Ventricular tachycardia (H) 10/4/2018     Social History     Social History     Marital status: Single     Spouse name: N/A     Number of children: N/A     Years of education: N/A     Occupational History     Not on file.     Social History Main Topics     Smoking status: Former Smoker     Years: 10.00     Smokeless tobacco: Never Used     Alcohol use 0.6 oz/week     1 Cans of beer per week      Comment: a couple times a week      Drug use: No     Sexual activity: Not on file     Other Topics Concern     Not on file     Social History Narrative     No family history on file.    MEDICATIONS   Current Outpatient Prescriptions on File Prior to Visit:  albuterol (PROAIR HFA/PROVENTIL HFA/VENTOLIN HFA) 108 (90 Base) MCG/ACT inhaler Inhale 2 puffs into the lungs every 6 hours as needed for shortness of breath / dyspnea or wheezing   amiodarone (PACERONE/CODARONE) 200 MG tablet Take 1 tablet (200 mg) by mouth daily   azelastine (ASTELIN) 0.1 % nasal spray Spray 2 sprays into both nostrils 2 times daily   carvedilol (COREG) 12.5 MG tablet Take 1 tablet (12.5 mg) by mouth 2 times daily (with meals)   DULoxetine (CYMBALTA) 30 MG EC capsule Take 1 capsule (30 mg) by mouth daily   furosemide (LASIX) 80 MG tablet Take one tab (80 mg) in am and 1/2 tab (40 mg) in pm   levothyroxine (SYNTHROID/LEVOTHROID) 50  "MCG tablet Take 1 tablet (50 mcg) by mouth daily   mesalamine (ASACOL HD) 800 MG EC tablet Take 3 tablets (2,400 mg) by mouth 2 times daily   montelukast (SINGULAIR) 10 MG tablet Take 1 tablet (10 mg) by mouth At Bedtime   omeprazole (PRILOSEC) 40 MG capsule Take 1 capsule (40 mg) by mouth daily In pm   ondansetron (ZOFRAN-ODT) 4 MG ODT tab Take 1 tablet (4 mg) by mouth every 8 hours as needed for nausea   polyethylene glycol (MIRALAX) Packet Take 17 g by mouth 4 times daily   potassium chloride SA (K-DUR/KLOR-CON M) 20 MEQ CR tablet Take 2 tablets (40 mEq) by mouth daily   spironolactone (ALDACTONE) 25 MG tablet Take 1 tablet (25 mg) by mouth daily   tamsulosin (FLOMAX) 0.4 MG capsule Take 1 capsule (0.4 mg) by mouth daily (with dinner)   warfarin (COUMADIN) 5 MG tablet Take 2.5 mg 2 dys/wk and 5 mg all others   Simethicone 125 MG CAPS Take 1 capsule by mouth 4 times daily     No current facility-administered medications on file prior to visit.     Physical examination:  BP 95/70 (BP Location: Right arm, Patient Position: Chair, Cuff Size: Child)  Pulse 63  Ht 1.727 m (5' 8\")  Wt 60.7 kg (133 lb 14.4 oz)  SpO2 97%  BMI 20.36 kg/m2  GENERAL APPEARANCE: chronically ill appearing male, pale in color, alert and no distress  HEENT: no icterus, no xanthelasmas, normal pupil size and reaction, normal palate, mucosa moist, no cyanosis.  NECK: no adenopathy, no asymmetry, masses, or scars  CHEST: lungs clear to auscultation - no rales, rhonchi or wheezes, no use of accessory muscles, no retractions, respirations are unlabored, normal respiratory rate, no kyphosis, no scoliosis  CARDIOVASCULAR: regular rhythm, normal S1 with physiologic split S2, no S3 or S4 and no murmur, click or rub, precordium quiet with normal PMI. JVP elevated to the jaw with +HJR  ABDOMEN: soft, non tender, without hepatomegaly  EXTREMITIES: no clubbing, cyanosis or edema  NEURO: alert and oriented to person/place/time, normal speech, gait and " affect  SKIN: no ecchymoses, no rashes    LABS  Last Comprehensive Metabolic Panel:  Sodium   Date Value Ref Range Status   11/15/2018 127 (L) 133 - 144 mmol/L Final     Potassium   Date Value Ref Range Status   11/15/2018 4.7 3.4 - 5.3 mmol/L Final     Chloride   Date Value Ref Range Status   11/15/2018 94 94 - 109 mmol/L Final     Carbon Dioxide   Date Value Ref Range Status   11/15/2018 24 20 - 32 mmol/L Final     Anion Gap   Date Value Ref Range Status   11/15/2018 9 3 - 14 mmol/L Final     Glucose   Date Value Ref Range Status   11/15/2018 128 (H) 70 - 99 mg/dL Final     Urea Nitrogen   Date Value Ref Range Status   11/15/2018 43 (H) 7 - 30 mg/dL Final     Creatinine   Date Value Ref Range Status   11/15/2018 1.70 (H) 0.66 - 1.25 mg/dL Final     GFR Estimate   Date Value Ref Range Status   11/15/2018 42 (L) >60 mL/min/1.7m2 Final     Comment:     Non  GFR Calc     Calcium   Date Value Ref Range Status   11/15/2018 9.1 8.5 - 10.1 mg/dL Final         DATA  RHC 11/5/18      MEST 10/22/18    RER 0.84  VE/VCO2 slope 42.46    Assessment and Plan  Mr. Larios is a 53 year old man with advanced heart failure currently undergoing evaluation for advanced therapies. He has numerous features of advanced illness. He is volume up despite his weight loss. He is hyponatremic with declining renal function. We've arranged for him to have a RHC today and likely admission for presumed cardiogenic shock.     35 minutes spent in direct care, >50% in counseling        Please do not hesitate to contact me if you have any questions/concerns.     Sincerely,     Dawna Sutton NP

## 2018-11-16 ENCOUNTER — APPOINTMENT (OUTPATIENT)
Dept: GENERAL RADIOLOGY | Facility: CLINIC | Age: 53
DRG: 286 | End: 2018-11-16
Attending: INTERNAL MEDICINE
Payer: MEDICARE

## 2018-11-16 ENCOUNTER — APPOINTMENT (OUTPATIENT)
Dept: CARDIOLOGY | Facility: CLINIC | Age: 53
DRG: 286 | End: 2018-11-16
Attending: INTERNAL MEDICINE
Payer: MEDICARE

## 2018-11-16 ENCOUNTER — APPOINTMENT (OUTPATIENT)
Dept: GENERAL RADIOLOGY | Facility: CLINIC | Age: 53
DRG: 286 | End: 2018-11-16
Attending: STUDENT IN AN ORGANIZED HEALTH CARE EDUCATION/TRAINING PROGRAM
Payer: MEDICARE

## 2018-11-16 ENCOUNTER — COMMUNICATION - HEALTHEAST (OUTPATIENT)
Dept: INTERNAL MEDICINE | Facility: CLINIC | Age: 53
End: 2018-11-16

## 2018-11-16 ENCOUNTER — DOCUMENTATION ONLY (OUTPATIENT)
Dept: CARDIOLOGY | Facility: CLINIC | Age: 53
End: 2018-11-16

## 2018-11-16 LAB
ANION GAP SERPL CALCULATED.3IONS-SCNC: 11 MMOL/L (ref 3–14)
ANION GAP SERPL CALCULATED.3IONS-SCNC: 9 MMOL/L (ref 3–14)
BASE DEFICIT BLDV-SCNC: 0.1 MMOL/L
BASE DEFICIT BLDV-SCNC: 0.9 MMOL/L
BASE DEFICIT BLDV-SCNC: 1.2 MMOL/L
BASE DEFICIT BLDV-SCNC: 1.5 MMOL/L
BASE EXCESS BLDV CALC-SCNC: 0.7 MMOL/L
BASE EXCESS BLDV CALC-SCNC: 1.2 MMOL/L
BASE EXCESS BLDV CALC-SCNC: 2 MMOL/L
BASOPHILS # BLD AUTO: 0 10E9/L (ref 0–0.2)
BASOPHILS NFR BLD AUTO: 0.3 %
BUN SERPL-MCNC: 39 MG/DL (ref 7–30)
BUN SERPL-MCNC: 42 MG/DL (ref 7–30)
CALCIUM SERPL-MCNC: 8.2 MG/DL (ref 8.5–10.1)
CALCIUM SERPL-MCNC: 8.6 MG/DL (ref 8.5–10.1)
CHLORIDE SERPL-SCNC: 98 MMOL/L (ref 94–109)
CHLORIDE SERPL-SCNC: 99 MMOL/L (ref 94–109)
CO2 SERPL-SCNC: 21 MMOL/L (ref 20–32)
CO2 SERPL-SCNC: 26 MMOL/L (ref 20–32)
CREAT SERPL-MCNC: 1.14 MG/DL (ref 0.66–1.25)
CREAT SERPL-MCNC: 1.27 MG/DL (ref 0.66–1.25)
DIFFERENTIAL METHOD BLD: ABNORMAL
EOSINOPHIL # BLD AUTO: 0.1 10E9/L (ref 0–0.7)
EOSINOPHIL NFR BLD AUTO: 0.6 %
ERYTHROCYTE [DISTWIDTH] IN BLOOD BY AUTOMATED COUNT: 20.5 % (ref 10–15)
GFR SERPL CREATININE-BSD FRML MDRD: 59 ML/MIN/1.7M2
GFR SERPL CREATININE-BSD FRML MDRD: 67 ML/MIN/1.7M2
GLUCOSE SERPL-MCNC: 103 MG/DL (ref 70–99)
GLUCOSE SERPL-MCNC: 97 MG/DL (ref 70–99)
HCO3 BLDV-SCNC: 22 MMOL/L (ref 21–28)
HCO3 BLDV-SCNC: 22 MMOL/L (ref 21–28)
HCO3 BLDV-SCNC: 23 MMOL/L (ref 21–28)
HCO3 BLDV-SCNC: 24 MMOL/L (ref 21–28)
HCO3 BLDV-SCNC: 25 MMOL/L (ref 21–28)
HCO3 BLDV-SCNC: 26 MMOL/L (ref 21–28)
HCO3 BLDV-SCNC: 26 MMOL/L (ref 21–28)
HCT VFR BLD AUTO: 50.6 % (ref 40–53)
HGB BLD-MCNC: 16.4 G/DL (ref 13.3–17.7)
IMM GRANULOCYTES # BLD: 0.1 10E9/L (ref 0–0.4)
IMM GRANULOCYTES NFR BLD: 0.6 %
INR PPP: 2.04 (ref 0.86–1.14)
INR PPP: 2.04 (ref 0.9–1.1)
INR PPP: NORMAL (ref 0.86–1.14)
INTERPRETATION ECG - MUSE: NORMAL
LA PPP-IMP: NEGATIVE
LYMPHOCYTES # BLD AUTO: 1.5 10E9/L (ref 0.8–5.3)
LYMPHOCYTES NFR BLD AUTO: 19.2 %
MAGNESIUM SERPL-MCNC: 2.2 MG/DL (ref 1.6–2.3)
MAGNESIUM SERPL-MCNC: 2.6 MG/DL (ref 1.6–2.3)
MCH RBC QN AUTO: 28.6 PG (ref 26.5–33)
MCHC RBC AUTO-ENTMCNC: 32.4 G/DL (ref 31.5–36.5)
MCV RBC AUTO: 88 FL (ref 78–100)
MONOCYTES # BLD AUTO: 0.9 10E9/L (ref 0–1.3)
MONOCYTES NFR BLD AUTO: 11.2 %
NEUTROPHILS # BLD AUTO: 5.3 10E9/L (ref 1.6–8.3)
NEUTROPHILS NFR BLD AUTO: 68.1 %
NRBC # BLD AUTO: 0 10*3/UL
NRBC BLD AUTO-RTO: 0 /100
O2/TOTAL GAS SETTING VFR VENT: 21 %
OXYHGB MFR BLDV: 55 %
OXYHGB MFR BLDV: 59 %
OXYHGB MFR BLDV: 63 %
OXYHGB MFR BLDV: 65 %
OXYHGB MFR BLDV: 67 %
OXYHGB MFR BLDV: 69 %
PCO2 BLDV: 32 MM HG (ref 40–50)
PCO2 BLDV: 33 MM HG (ref 40–50)
PCO2 BLDV: 33 MM HG (ref 40–50)
PCO2 BLDV: 36 MM HG (ref 40–50)
PCO2 BLDV: 38 MM HG (ref 40–50)
PCO2 BLDV: 38 MM HG (ref 40–50)
PCO2 BLDV: 40 MM HG (ref 40–50)
PH BLDV: 7.41 PH (ref 7.32–7.43)
PH BLDV: 7.42 PH (ref 7.32–7.43)
PH BLDV: 7.43 PH (ref 7.32–7.43)
PH BLDV: 7.44 PH (ref 7.32–7.43)
PLATELET # BLD AUTO: 266 10E9/L (ref 150–450)
PO2 BLDV: 32 MM HG (ref 25–47)
PO2 BLDV: 35 MM HG (ref 25–47)
PO2 BLDV: 35 MM HG (ref 25–47)
PO2 BLDV: 36 MM HG (ref 25–47)
PO2 BLDV: 37 MM HG (ref 25–47)
PO2 BLDV: 38 MM HG (ref 25–47)
PO2 BLDV: 41 MM HG (ref 25–47)
POTASSIUM BLD-SCNC: 2.8 MMOL/L (ref 3.4–5.3)
POTASSIUM SERPL-SCNC: 3.6 MMOL/L (ref 3.4–5.3)
POTASSIUM SERPL-SCNC: 4.4 MMOL/L (ref 3.4–5.3)
POTASSIUM SERPL-SCNC: 4.4 MMOL/L (ref 3.4–5.3)
RADIOLOGIST FLAGS: ABNORMAL
RBC # BLD AUTO: 5.73 10E12/L (ref 4.4–5.9)
SODIUM SERPL-SCNC: 130 MMOL/L (ref 133–144)
SODIUM SERPL-SCNC: 132 MMOL/L (ref 133–144)
WBC # BLD AUTO: 7.8 10E9/L (ref 4–11)

## 2018-11-16 PROCEDURE — 82805 BLOOD GASES W/O2 SATURATION: CPT | Performed by: INTERNAL MEDICINE

## 2018-11-16 PROCEDURE — 20000004 ZZH R&B ICU UMMC

## 2018-11-16 PROCEDURE — 27210185 ZZH KIT OPEN ENDED DOUBLE LUMEN

## 2018-11-16 PROCEDURE — A9270 NON-COVERED ITEM OR SERVICE: HCPCS | Mod: GY | Performed by: STUDENT IN AN ORGANIZED HEALTH CARE EDUCATION/TRAINING PROGRAM

## 2018-11-16 PROCEDURE — 25000131 ZZH RX MED GY IP 250 OP 636 PS 637: Mod: GY | Performed by: INTERNAL MEDICINE

## 2018-11-16 PROCEDURE — 25500064 ZZH RX 255 OP 636: Performed by: INTERNAL MEDICINE

## 2018-11-16 PROCEDURE — 25000132 ZZH RX MED GY IP 250 OP 250 PS 637: Mod: GY | Performed by: STUDENT IN AN ORGANIZED HEALTH CARE EDUCATION/TRAINING PROGRAM

## 2018-11-16 PROCEDURE — 40000986 XR CHEST PORT 1 VW

## 2018-11-16 PROCEDURE — 25000125 ZZHC RX 250: Performed by: INTERNAL MEDICINE

## 2018-11-16 PROCEDURE — 25000132 ZZH RX MED GY IP 250 OP 250 PS 637: Mod: GY | Performed by: INTERNAL MEDICINE

## 2018-11-16 PROCEDURE — A9270 NON-COVERED ITEM OR SERVICE: HCPCS | Mod: GY | Performed by: INTERNAL MEDICINE

## 2018-11-16 PROCEDURE — 71045 X-RAY EXAM CHEST 1 VIEW: CPT | Mod: 76

## 2018-11-16 PROCEDURE — 71045 X-RAY EXAM CHEST 1 VIEW: CPT | Mod: 77

## 2018-11-16 PROCEDURE — 25000128 H RX IP 250 OP 636: Performed by: STUDENT IN AN ORGANIZED HEALTH CARE EDUCATION/TRAINING PROGRAM

## 2018-11-16 PROCEDURE — 84132 ASSAY OF SERUM POTASSIUM: CPT | Performed by: INTERNAL MEDICINE

## 2018-11-16 PROCEDURE — 85610 PROTHROMBIN TIME: CPT | Performed by: INTERNAL MEDICINE

## 2018-11-16 PROCEDURE — 40000141 ZZH STATISTIC PERIPHERAL IV START W/O US GUIDANCE

## 2018-11-16 PROCEDURE — 80048 BASIC METABOLIC PNL TOTAL CA: CPT | Performed by: INTERNAL MEDICINE

## 2018-11-16 PROCEDURE — 93010 ELECTROCARDIOGRAM REPORT: CPT | Performed by: INTERNAL MEDICINE

## 2018-11-16 PROCEDURE — 93005 ELECTROCARDIOGRAM TRACING: CPT

## 2018-11-16 PROCEDURE — 25000128 H RX IP 250 OP 636: Performed by: INTERNAL MEDICINE

## 2018-11-16 PROCEDURE — 93306 TTE W/DOPPLER COMPLETE: CPT | Mod: 26 | Performed by: INTERNAL MEDICINE

## 2018-11-16 PROCEDURE — 83735 ASSAY OF MAGNESIUM: CPT | Performed by: INTERNAL MEDICINE

## 2018-11-16 PROCEDURE — 36569 INSJ PICC 5 YR+ W/O IMAGING: CPT

## 2018-11-16 PROCEDURE — 85025 COMPLETE CBC W/AUTO DIFF WBC: CPT | Performed by: INTERNAL MEDICINE

## 2018-11-16 PROCEDURE — 71045 X-RAY EXAM CHEST 1 VIEW: CPT

## 2018-11-16 PROCEDURE — 99291 CRITICAL CARE FIRST HOUR: CPT | Mod: 25 | Performed by: INTERNAL MEDICINE

## 2018-11-16 RX ORDER — ISOSORBIDE DINITRATE 10 MG/1
20 TABLET ORAL
Status: DISCONTINUED | OUTPATIENT
Start: 2018-11-16 | End: 2018-11-16

## 2018-11-16 RX ORDER — POTASSIUM CHLORIDE 1.5 G/1.58G
20-40 POWDER, FOR SOLUTION ORAL
Status: DISCONTINUED | OUTPATIENT
Start: 2018-11-16 | End: 2018-11-23 | Stop reason: HOSPADM

## 2018-11-16 RX ORDER — DULOXETIN HYDROCHLORIDE 20 MG/1
20 CAPSULE, DELAYED RELEASE ORAL DAILY
COMMUNITY
End: 2022-02-08

## 2018-11-16 RX ORDER — SPIRONOLACTONE 50 MG/1
50 TABLET, FILM COATED ORAL DAILY
Status: ON HOLD | COMMUNITY
End: 2018-11-23

## 2018-11-16 RX ORDER — POTASSIUM CHLORIDE 750 MG/1
20-40 TABLET, EXTENDED RELEASE ORAL
Status: DISCONTINUED | OUTPATIENT
Start: 2018-11-16 | End: 2018-11-23 | Stop reason: HOSPADM

## 2018-11-16 RX ORDER — CARVEDILOL 3.12 MG/1
12.5 TABLET ORAL 2 TIMES DAILY
Status: ON HOLD | COMMUNITY
End: 2018-11-23

## 2018-11-16 RX ORDER — POTASSIUM CHLORIDE 7.45 MG/ML
10 INJECTION INTRAVENOUS
Status: DISCONTINUED | OUTPATIENT
Start: 2018-11-16 | End: 2018-11-23 | Stop reason: HOSPADM

## 2018-11-16 RX ORDER — SPIRONOLACTONE 25 MG/1
50 TABLET ORAL DAILY
Status: DISCONTINUED | OUTPATIENT
Start: 2018-11-17 | End: 2018-11-19

## 2018-11-16 RX ORDER — LIDOCAINE 40 MG/G
CREAM TOPICAL
Status: DISCONTINUED | OUTPATIENT
Start: 2018-11-16 | End: 2018-11-23 | Stop reason: HOSPADM

## 2018-11-16 RX ORDER — ACETAMINOPHEN 650 MG/1
650 SUPPOSITORY RECTAL EVERY 4 HOURS PRN
Status: DISCONTINUED | OUTPATIENT
Start: 2018-11-16 | End: 2018-11-23 | Stop reason: HOSPADM

## 2018-11-16 RX ORDER — WARFARIN SODIUM 5 MG/1
5 TABLET ORAL
Status: COMPLETED | OUTPATIENT
Start: 2018-11-16 | End: 2018-11-16

## 2018-11-16 RX ORDER — POTASSIUM CHLORIDE 750 MG/1
20 TABLET, EXTENDED RELEASE ORAL 2 TIMES DAILY
Status: DISCONTINUED | OUTPATIENT
Start: 2018-11-16 | End: 2018-11-16

## 2018-11-16 RX ORDER — POTASSIUM CL/LIDO/0.9 % NACL 10MEQ/0.1L
10 INTRAVENOUS SOLUTION, PIGGYBACK (ML) INTRAVENOUS
Status: DISCONTINUED | OUTPATIENT
Start: 2018-11-16 | End: 2018-11-23 | Stop reason: HOSPADM

## 2018-11-16 RX ORDER — ISOSORBIDE DINITRATE 30 MG/1
30 TABLET ORAL
Status: DISCONTINUED | OUTPATIENT
Start: 2018-11-16 | End: 2018-11-19

## 2018-11-16 RX ORDER — DULOXETIN HYDROCHLORIDE 20 MG/1
20 CAPSULE, DELAYED RELEASE ORAL DAILY
Status: DISCONTINUED | OUTPATIENT
Start: 2018-11-17 | End: 2018-11-23 | Stop reason: HOSPADM

## 2018-11-16 RX ORDER — PROCHLORPERAZINE MALEATE 5 MG
5-10 TABLET ORAL EVERY 6 HOURS PRN
Status: DISCONTINUED | OUTPATIENT
Start: 2018-11-16 | End: 2018-11-23 | Stop reason: HOSPADM

## 2018-11-16 RX ORDER — POTASSIUM CHLORIDE 29.8 MG/ML
20 INJECTION INTRAVENOUS
Status: DISCONTINUED | OUTPATIENT
Start: 2018-11-16 | End: 2018-11-23 | Stop reason: HOSPADM

## 2018-11-16 RX ORDER — HEPARIN SODIUM,PORCINE 10 UNIT/ML
5-10 VIAL (ML) INTRAVENOUS
Status: DISCONTINUED | OUTPATIENT
Start: 2018-11-16 | End: 2018-11-23 | Stop reason: HOSPADM

## 2018-11-16 RX ORDER — HEPARIN SODIUM,PORCINE 10 UNIT/ML
5-10 VIAL (ML) INTRAVENOUS EVERY 24 HOURS
Status: DISCONTINUED | OUTPATIENT
Start: 2018-11-16 | End: 2018-11-23 | Stop reason: HOSPADM

## 2018-11-16 RX ORDER — HYDRALAZINE HYDROCHLORIDE 25 MG/1
25 TABLET, FILM COATED ORAL
Status: DISCONTINUED | OUTPATIENT
Start: 2018-11-16 | End: 2018-11-16

## 2018-11-16 RX ORDER — POTASSIUM CHLORIDE 750 MG/1
40 TABLET, EXTENDED RELEASE ORAL 2 TIMES DAILY
Status: DISCONTINUED | OUTPATIENT
Start: 2018-11-16 | End: 2018-11-17

## 2018-11-16 RX ORDER — PROCHLORPERAZINE 25 MG
25 SUPPOSITORY, RECTAL RECTAL EVERY 12 HOURS PRN
Status: DISCONTINUED | OUTPATIENT
Start: 2018-11-16 | End: 2018-11-23 | Stop reason: HOSPADM

## 2018-11-16 RX ORDER — FLUTICASONE PROPIONATE 220 UG/1
2 AEROSOL, METERED RESPIRATORY (INHALATION) 2 TIMES DAILY
COMMUNITY
End: 2022-10-26

## 2018-11-16 RX ORDER — FUROSEMIDE 10 MG/ML
120 INJECTION INTRAMUSCULAR; INTRAVENOUS ONCE
Status: COMPLETED | OUTPATIENT
Start: 2018-11-16 | End: 2018-11-16

## 2018-11-16 RX ORDER — MAGNESIUM SULFATE HEPTAHYDRATE 40 MG/ML
4 INJECTION, SOLUTION INTRAVENOUS EVERY 4 HOURS PRN
Status: DISCONTINUED | OUTPATIENT
Start: 2018-11-16 | End: 2018-11-23 | Stop reason: HOSPADM

## 2018-11-16 RX ORDER — LEVOTHYROXINE SODIUM 100 UG/1
100 TABLET ORAL DAILY
COMMUNITY
End: 2019-11-17

## 2018-11-16 RX ORDER — ACETAMINOPHEN 325 MG/1
650 TABLET ORAL EVERY 4 HOURS PRN
Status: DISCONTINUED | OUTPATIENT
Start: 2018-11-16 | End: 2018-11-23 | Stop reason: HOSPADM

## 2018-11-16 RX ORDER — HEPARIN SODIUM,PORCINE 10 UNIT/ML
2-5 VIAL (ML) INTRAVENOUS
Status: DISCONTINUED | OUTPATIENT
Start: 2018-11-16 | End: 2018-11-23 | Stop reason: HOSPADM

## 2018-11-16 RX ADMIN — SPIRONOLACTONE 25 MG: 25 TABLET ORAL at 12:02

## 2018-11-16 RX ADMIN — POTASSIUM CHLORIDE 40 MEQ: 750 TABLET, EXTENDED RELEASE ORAL at 15:11

## 2018-11-16 RX ADMIN — POTASSIUM CHLORIDE 20 MEQ: 750 TABLET, EXTENDED RELEASE ORAL at 05:13

## 2018-11-16 RX ADMIN — POTASSIUM CHLORIDE 40 MEQ: 750 TABLET, EXTENDED RELEASE ORAL at 20:00

## 2018-11-16 RX ADMIN — AZELASTINE HYDROCHLORIDE 2 SPRAY: 137 SPRAY, METERED NASAL at 20:02

## 2018-11-16 RX ADMIN — OMEPRAZOLE 40 MG: 20 CAPSULE, DELAYED RELEASE ORAL at 08:16

## 2018-11-16 RX ADMIN — WARFARIN SODIUM 5 MG: 5 TABLET ORAL at 17:56

## 2018-11-16 RX ADMIN — SODIUM NITROPRUSSIDE 1.25 MCG/KG/MIN: 25 INJECTION INTRAVENOUS at 11:45

## 2018-11-16 RX ADMIN — Medication 37.5 MG: at 17:56

## 2018-11-16 RX ADMIN — ISOSORBIDE DINITRATE 20 MG: 10 TABLET ORAL at 11:57

## 2018-11-16 RX ADMIN — POTASSIUM CHLORIDE 40 MEQ: 750 TABLET, EXTENDED RELEASE ORAL at 12:53

## 2018-11-16 RX ADMIN — Medication 1 MG: at 20:18

## 2018-11-16 RX ADMIN — AMIODARONE HYDROCHLORIDE 200 MG: 200 TABLET ORAL at 08:16

## 2018-11-16 RX ADMIN — ACETAMINOPHEN 650 MG: 325 TABLET, FILM COATED ORAL at 23:42

## 2018-11-16 RX ADMIN — DULOXETINE HYDROCHLORIDE 30 MG: 30 CAPSULE, DELAYED RELEASE ORAL at 08:15

## 2018-11-16 RX ADMIN — MONTELUKAST SODIUM 10 MG: 10 TABLET, COATED ORAL at 20:01

## 2018-11-16 RX ADMIN — FUROSEMIDE 5 MG/HR: 10 INJECTION, SOLUTION INTRAVENOUS at 02:23

## 2018-11-16 RX ADMIN — HUMAN ALBUMIN MICROSPHERES AND PERFLUTREN 6 ML: 10; .22 INJECTION, SOLUTION INTRAVENOUS at 11:00

## 2018-11-16 RX ADMIN — FUROSEMIDE 5 MG/HR: 10 INJECTION, SOLUTION INTRAVENOUS at 21:39

## 2018-11-16 RX ADMIN — PROCHLORPERAZINE EDISYLATE 5 MG: 5 INJECTION INTRAMUSCULAR; INTRAVENOUS at 18:44

## 2018-11-16 RX ADMIN — ACETAMINOPHEN 650 MG: 325 TABLET, FILM COATED ORAL at 02:22

## 2018-11-16 RX ADMIN — MESALAMINE 2400 MG: 800 TABLET, DELAYED RELEASE ORAL at 08:15

## 2018-11-16 RX ADMIN — HYDRALAZINE HYDROCHLORIDE 25 MG: 25 TABLET ORAL at 11:57

## 2018-11-16 RX ADMIN — TAMSULOSIN HYDROCHLORIDE 0.4 MG: 0.4 CAPSULE ORAL at 16:29

## 2018-11-16 RX ADMIN — ONDANSETRON 4 MG: 4 TABLET, ORALLY DISINTEGRATING ORAL at 16:29

## 2018-11-16 RX ADMIN — LIDOCAINE HYDROCHLORIDE 5 ML: 10 INJECTION, SOLUTION EPIDURAL; INFILTRATION; INTRACAUDAL; PERINEURAL at 15:38

## 2018-11-16 RX ADMIN — ISOSORBIDE DINITRATE 30 MG: 30 TABLET ORAL at 17:56

## 2018-11-16 RX ADMIN — LEVOTHYROXINE SODIUM 88 MCG: 88 TABLET ORAL at 08:15

## 2018-11-16 RX ADMIN — MESALAMINE 2400 MG: 800 TABLET, DELAYED RELEASE ORAL at 20:00

## 2018-11-16 RX ADMIN — FUROSEMIDE 120 MG: 10 INJECTION, SOLUTION INTRAVENOUS at 02:22

## 2018-11-16 RX ADMIN — Medication 2.5 MG: at 20:18

## 2018-11-16 RX ADMIN — SODIUM NITROPRUSSIDE 0.25 MCG/KG/MIN: 25 INJECTION INTRAVENOUS at 02:33

## 2018-11-16 ASSESSMENT — ACTIVITIES OF DAILY LIVING (ADL)
BATHING: 0-->INDEPENDENT
ADLS_ACUITY_SCORE: 15
RETIRED_COMMUNICATION: 0-->UNDERSTANDS/COMMUNICATES WITHOUT DIFFICULTY
RETIRED_EATING: 0-->INDEPENDENT
FALL_HISTORY_WITHIN_LAST_SIX_MONTHS: YES
ADLS_ACUITY_SCORE: 11
ADLS_ACUITY_SCORE: 15
SWALLOWING: 0-->SWALLOWS FOODS/LIQUIDS WITHOUT DIFFICULTY
ADLS_ACUITY_SCORE: 15
WHICH_OF_THE_ABOVE_FUNCTIONAL_RISKS_HAD_A_RECENT_ONSET_OR_CHANGE?: FALL HISTORY
NUMBER_OF_TIMES_PATIENT_HAS_FALLEN_WITHIN_LAST_SIX_MONTHS: 1
TRANSFERRING: 0-->INDEPENDENT
COGNITION: 0 - NO COGNITION ISSUES REPORTED
AMBULATION: 0-->INDEPENDENT
ADLS_ACUITY_SCORE: 15
TOILETING: 0-->INDEPENDENT
DRESS: 0-->INDEPENDENT
ADLS_ACUITY_SCORE: 15

## 2018-11-16 ASSESSMENT — PAIN DESCRIPTION - DESCRIPTORS: DESCRIPTORS: ACHING

## 2018-11-16 NOTE — PHARMACY-ADMISSION MEDICATION HISTORY
Admission medication history interview status for the 11/15/2018 admission is complete. See Epic admission navigator for allergy information, pharmacy, prior to admission medications and immunization status.     Medication history interview sources:  patient    Changes made to PTA medication list (reason)  Added: Flovent  mcg/act inhaler: inhale 2 puffs into the lungs two times daily  Deleted: Miralax and simethicone (patient reported that he stopped taking these)  Changed:   Beano PO: take by mouth as needed -> take 2 tablets by mouth three times daily (per patient)  Carvedilol 12.5 mg tablet: take 6.25 mg by mouth 2 times daily -> carvedilol 3.125 mg tablet: take 4 tablets by mouth 2 times daily (per patient and Surescripts)  Duloxetine 30 mg capsule -> duloxetine 20 mg capsule: take 20 mg by mouth daily (per patient and Surescripts)  Levothyroxine 50 mcg tablet -> levothyroxine 88 mcg tablet: take 88 mcg by mouth daily  Potassium chloride SA 20 MEQ CR tablet: take 2 tablets by mouth daily -> take 20mEq by mouth 2 times daily  Spironolactone 25 mg tablet: take 1 tablet by mouth daily -> spironolactone 50 mg tablet: take 50 mg by mouth daily  Warfarin 5 mg tablet: take 2.5 mg 2 dys/wk and 5 mg all others -> take 2.5 mg by mouth on Mon/Wed/Fri and 5 mg on all other days of the week      Additional medication history information (including reliability of information, actions taken by pharmacist): Patient was a moderate historian. He was familiar with most of his medications and doses.      Prior to Admission medications    Medication Sig Last Dose Taking? Auth Provider   albuterol (PROAIR HFA/PROVENTIL HFA/VENTOLIN HFA) 108 (90 Base) MCG/ACT inhaler Inhale 2 puffs into the lungs every 6 hours as needed for shortness of breath / dyspnea or wheezing 11/15/2018 at 0700 Yes    Alpha-D-Galactosidase (BEANO PO) Take 2 tablets by mouth 3 times daily  11/15/2018 at 1400 Yes Reported, Patient   amiodarone  (PACERONE/CODARONE) 200 MG tablet Take 1 tablet (200 mg) by mouth daily 11/15/2018 at 0700 Yes    azelastine (ASTELIN) 0.1 % nasal spray Spray 2 sprays into both nostrils 2 times daily 11/15/2018 at 0700 Yes    carvedilol (COREG) 3.125 MG tablet Take 12.5 mg by mouth 2 times daily 11/15/2018 at AM Yes Unknown, Entered By History   DULoxetine (CYMBALTA) 20 MG EC capsule Take 20 mg by mouth daily 11/15/2018 at AM Yes Unknown, Entered By History   fluticasone (FLOVENT HFA) 220 MCG/ACT Inhaler Inhale 2 puffs into the lungs 2 times daily 11/15/2018 at AM Yes Unknown, Entered By History   furosemide (LASIX) 80 MG tablet Take 80 mg by mouth 2 times daily  11/15/2018 at 0700 Yes    levothyroxine (SYNTHROID/LEVOTHROID) 88 MCG tablet Take 88 mcg by mouth daily 11/15/2018 at AM Yes Unknown, Entered By History   mesalamine (ASACOL HD) 800 MG EC tablet Take 3 tablets (2,400 mg) by mouth 2 times daily 11/15/2018 at 1300 Yes    montelukast (SINGULAIR) 10 MG tablet Take 1 tablet (10 mg) by mouth At Bedtime 11/14/2018 at Unknown time Yes    omeprazole (PRILOSEC) 40 MG capsule Take 1 capsule (40 mg) by mouth daily In pm 11/14/2018 at unknown Yes    ondansetron (ZOFRAN-ODT) 4 MG ODT tab Take 1 tablet (4 mg) by mouth every 8 hours as needed for nausea 11/14/2018 at Unknown time Yes    potassium chloride SA (K-DUR/KLOR-CON M) 20 MEQ CR tablet Take 2 tablets (40 mEq) by mouth daily  Patient taking differently: Take 20 mEq by mouth 2 times daily  11/15/2018 at 0700 Yes Rosendo Beck MD   spironolactone (ALDACTONE) 50 MG tablet Take 50 mg by mouth daily 11/15/2018 at AM Yes Unknown, Entered By History   tamsulosin (FLOMAX) 0.4 MG capsule Take 1 capsule (0.4 mg) by mouth daily (with dinner) 11/14/2018 at Unknown time Yes    warfarin (COUMADIN) 5 MG tablet Take 2.5 mg on Mon/Wed/Fri and 5 mg on all other days of the week 11/14/2018 at Unknown time Yes          Medication history completed by: Rodna Diamond, PharmD 4 Student

## 2018-11-16 NOTE — H&P
Cardiology H&P   Everton Larios ([id number]) admitted on 11/15/2018  Primary care provider: Fredrick Wolff         Chief Complaint:     Dyspnea, fatigue.          History of Present Illness     Everton Larios is a 53 year old male with history of atrial fibrillation and atrial fibrillation ablation, nonischemic cardiomyopathy (EF 15%), congenital ASD repair in childhood, s/p AICD (2008, generator change 2018), hypothyroidism, and remote history of GI bleed and splenic embolization who presented after RHC for inotrope support and accelerated LVAD assessment.     He has been been feeling progressively fatigued with dyspnea on exertion which limits his ability to work as a . He optimistically estimates that he could walk 100 ft without stopping. He typically notices fluid accumulate on his abdomen and when this occurs, he has nausea, emesis and overall poor appetite. In the past few days, abdominal swelling has not been an issue. He also endorses chest pressure sensation for years which occurs daily, provoked by activity and relieved with rest. He is unsure of what a good weight is for him, but the lowest weight he has been in the past year was 127#. Currently, he is taking 80 mg lasix twice daily and spironolactone 50 mg daily.      ROS (+): Constipation;      Remainder of 12pt-ROS negative except mentioned above         Other Hx   PMHx  Patient Active Problem List   Diagnosis     H/O congenital atrial septal defect (ASD) repair at age 3     Nonischemic cardiomyopathy (H)     Paroxysmal atrial fibrillation (H)     Ventricular tachycardia (H)     On amiodarone therapy     Ulcerative colitis (H)     ICD, [employer] 2008; gen change 2/2018     S/P ablation of atrial fibrillation     Hypothyroidism due to medication     Chronic systolic heart failure (H)     DJD (degenerative joint disease) - neck     Pierce's esophagus     Intermittent asthma without complication     Chronic rhinitis      Depression     Iron deficiency anemia     PSx  1. ASD repair 1968  2. Carpal tunnel release  3. ICD placement     Medications    No current facility-administered medications on file prior to encounter.   Current Outpatient Prescriptions on File Prior to Encounter:  albuterol (PROAIR HFA/PROVENTIL HFA/VENTOLIN HFA) 108 (90 Base) MCG/ACT inhaler Inhale 2 puffs into the lungs every 6 hours as needed for shortness of breath / dyspnea or wheezing   Alpha-D-Galactosidase (BEANO PO) Take by mouth as needed   amiodarone (PACERONE/CODARONE) 200 MG tablet Take 1 tablet (200 mg) by mouth daily   azelastine (ASTELIN) 0.1 % nasal spray Spray 2 sprays into both nostrils 2 times daily   carvedilol (COREG) 12.5 MG tablet Take 6.25 mg by mouth 2 times daily (with meals)   DULoxetine (CYMBALTA) 30 MG EC capsule Take 1 capsule (30 mg) by mouth daily   furosemide (LASIX) 80 MG tablet Take 80 mg by mouth 3 times daily   levothyroxine (SYNTHROID/LEVOTHROID) 50 MCG tablet Take 1 tablet (50 mcg) by mouth daily   mesalamine (ASACOL HD) 800 MG EC tablet Take 3 tablets (2,400 mg) by mouth 2 times daily   montelukast (SINGULAIR) 10 MG tablet Take 1 tablet (10 mg) by mouth At Bedtime   omeprazole (PRILOSEC) 40 MG capsule Take 1 capsule (40 mg) by mouth daily In pm   ondansetron (ZOFRAN-ODT) 4 MG ODT tab Take 1 tablet (4 mg) by mouth every 8 hours as needed for nausea   polyethylene glycol (MIRALAX) Packet Take 17 g by mouth 4 times daily   potassium chloride SA (K-DUR/KLOR-CON M) 20 MEQ CR tablet Take 2 tablets (40 mEq) by mouth daily   spironolactone (ALDACTONE) 25 MG tablet Take 1 tablet (25 mg) by mouth daily   tamsulosin (FLOMAX) 0.4 MG capsule Take 1 capsule (0.4 mg) by mouth daily (with dinner)   warfarin (COUMADIN) 5 MG tablet Take 2.5 mg 2 dys/wk and 5 mg all others   Simethicone 125 MG CAPS Take 1 capsule by mouth 4 times daily     Allergy  Lisinopril   Codeine     Family Hx  History reviewed. No pertinent family history.    Social  "Gisela  [facility] [place]. Works for [employer] as a part time . Previous smoker, 36 pack year history, quit 10 years ago. Drank 6-10 drinks/week previously, none in the past month at the advice of Dr. Beck.          Vitals and Exam/ Objectives     Physical exam:  BP 98/65  Pulse 65  Temp 97.8  F (36.6  C) (Oral)  Resp 16  Ht 1.727 m (5' 8\")  Wt 60.3 kg (133 lb)  SpO2 98%  BMI 20.22 kg/m2  Wt Readings from Last 2 Encounters:   11/15/18 60.3 kg (133 lb)   11/15/18 60.7 kg (133 lb 14.4 oz)     No intake or output data in the 24 hours ending 11/15/18 1929    General: AAOx3, no clubbing/cyanosis, 12 cm JVD  HEENT:  PERRL, EOMI, MMM with out pharyngeal erythema.   Cardiac: RRR. No m/r/g. Normal S1, S2. DP2+ bilaterally  Pulm: CTAB, no wheezes, bibasilar crackles.  Abd: +BS, soft, distended, non tender. No hepatosplenomegaly.  Skin: No rash  MSK: No deformities, no joint swelling  Extremities: 1+ pitting edema at the ankles.   Neuro: CN grossly intact, no focal deficits  Psych: Euthymic mood, full affect    Imaging/procedure results:  No results found for this or any previous visit (from the past 48 hour(s)).    EKG   Pending          Assessment and Plans   Everton Larios is a 53 year old male with history of atrial fibrillation and atrial fibrillation ablation, nonischemic cardiomyopathy (EF 15%), congenital ASD repair in childhood, s/p AICD (2008, generator change 2018), hypothyroidism, and remote history of GI bleed and splenic embolization who presented after RHC for inotrope support and accelerated LVAD assessment.      # NICM (EF 15 %, last echo 8-2018 at OSH)  Unclear etiology, though prior heavy alcohol use (has abstained for the past month at the advice of Dr. Beck) and a history of remote ASD closure in childhood. Prior angiogram in 2011 was negative for coronary artery disease per chart review. Chart review also mentions congenital cardiomyopathy, " congenital RV dysplasia without further details. Overnight, will start dobutamine, diuresis with gtt and hemodynamic monitoring.   - Inotrope: Dobutamine  - Volume: Hypervolemic -- furosemide bolus followed by gtt  - Nipride  - BB: Holding in the setting of decompensation   - CRT since 2008, generator changed 2018  - Functional BiV pacing   - Hemodynamic monitoring  - I/Os, daily weights   - Echo     # Paroxsymal atrial fibrillation  # H/O ventricular tachycardia   No recent ventricular arrhythmias per chart review.   - Cont pta amiodarone  - Cont pta warfarin     # Ulcerative colitis   - Cont pta mesalamine   - Consider GI consultation in the AM    Chronic problems  # Hypothyroidism : Cont pta synthroid   # Asthma : Cont pta albuterol, montelukast  # Pierce's esophagus : Cont pta omeprazole  # MDD : Cont duloxetine  # BPH : Cont pta tamsulosin    FEN: Cardiac / 2 Na / 2 L fluid restriction  DVT Prophylaxis:  Cont pta warfarin   Disposition: Anticipate 4-6 days inpatient stay. PT/OT ordered to assist with discharge planning.   Code Status: FULL     To be staffed in the AM.     Nisha Mckeon  Internal Medicine PGY3  5875         Other Labs   Data     Recent Labs  Lab 11/15/18  2005 11/15/18  0955   WBC 7.2  --    HGB 16.2  --    MCV 88  --      --    INR 2.09* 2.00*    127*   POTASSIUM 3.7 4.7   CHLORIDE 100 94   CO2 22 24   BUN 40* 43*   CR 1.40* 1.70*   ANIONGAP 11 9   RADAMES 8.2* 9.1   GLC 97 128*

## 2018-11-16 NOTE — PLAN OF CARE
Problem: Patient Care Overview  Goal: Plan of Care/Patient Progress Review  Outcome: Improving  D/I: Patient on 4E CVICU for LVAD work-up and advance heart failure therapies.   Neuro: Intact. Slight numbness at baseline in left leg. No other deficits.   CV: HR and BP stable. MD aware and okay with BP being 80s/60s with the MAP above 65. Afebrile. Ficks q4 hrs, see flowsheets.   Pulm: Lungs are clear on RA.   GI: Passing gas. No BM this shift. Bowel sounds active.   : Voiding adequately in urinal. 150-300cc/2 hrs.   Gtts: Lasix @ 5, Nipride @ 1.5, Dobutamine @ 2.5.   Pain: Pain in right neck swan site. Tylenol given PRN.   See flow sheets for further interventions and assessments.  A: Stable  P: Continue to monitor pt closely. Notify MD of significant changes.

## 2018-11-16 NOTE — PHARMACY-ANTICOAGULATION SERVICE
Clinical Pharmacy - Warfarin Dosing Consult     Pharmacy has been consulted to manage this patient s warfarin therapy.  Indication: Atrial Fibrillation  Therapy Goal: INR 2-2.5  OP Anticoag Clinic: [facility]  Warfarin Prior to Admission: Yes  Warfarin PTA Regimen: 2.5 mg daily on Sundays and Wednesdays; and 5 mg daily rest of week  Significant drug interactions: amiodarone (home medication)    INR   Date Value Ref Range Status   11/15/2018 2.09 (H) 0.86 - 1.14 Final   11/15/2018 2.00 (H) 0.86 - 1.14 Final       Recommend warfarin 5 mg today.  Pharmacy will monitor Everton Larios daily and order warfarin doses to achieve specified goal.      Please contact pharmacy as soon as possible if the warfarin needs to be held for a procedure or if the warfarin goals change.    Maxwell Sutton, PharmD

## 2018-11-16 NOTE — PROGRESS NOTES
Cardiology Progress Note 11/16/2018  ===================================================  Assessment & Plan ; Hospital Day #1    Everton Larios is a 53 year old male with history of atrial fibrillation and atrial fibrillation ablation, nonischemic cardiomyopathy (EF 15%), congenital ASD repair in childhood, s/p AICD (2008, generator change 2018), hypothyroidism, and remote history of GI bleed and splenic embolization who presented after [facility] for inotrope support and accelerated LVAD assessment.         Today's Change and Brief Plan  - start hydralazine 25 TID and Isordil 20 TID(with meals)  - try to wean down Nipride  - PICC line for dobutamine (hopefully can take out Katy tomorrow)  - cont Lasix drip for now but will recheck hemodynamics in the afternoon  - continue dobutamine for now  - check chest Xp    # NICM (EF 15 %, last echo 8-2018 at OSH)  Unclear etiology, though prior heavy alcohol use (has abstained for the past month at the advice of Dr. Beck) and a history of remote ASD closure in childhood. Prior angiogram in 2011 was negative for CAD per chart review. Chart review also mentions congenital cardiomyopathy, congenital RV dysplasia without further details.   Low cardiac function with VO2 12 and VE/CO2 slope 42 on CPX.  - Inotrope: Dobutamine  - Volume: Hypervolemic -- furosemide gtt  - Nipride  - starting oral afterload reduction([id number] and Isordil 20 TID)  - BB: Holding in the setting of decompensation   - CRT since 2008, generator changed 2018  - Functional BiV pacing   - Echo      # Paroxsymal atrial fibrillation  # H/O ventricular tachycardia   No recent ventricular arrhythmias per chart review.   - Cont pta amiodarone 200 daily and warfarin     # Ulcerative colitis   - Cont pta mesalamine   - Consider GI consultation if needed     Chronic problems  # Hypothyroidism : Cont pta synthroid   # Asthma : Cont pta albuterol, montelukast  # Pierce's esophagus : Cont pta omeprazole  # MDD : Cont  duloxetine  # BPH : Cont pta tamsulosin     FEN: Cardiac / 2 Na / 2 L fluid restriction  DVT Prophylaxis:  Cont pta warfarin   Disposition: pending LVAD evaluation  Code Status: FULL       Discussed with MD Gagandeep.     Sancho Guerrero MD  Cardiology Fellow [id number]        Late entry - pt seen and examined on 11/16/18  Critical Care ICU Note - Cardiology  Corinna Donis M.D.    The patient remains in the ICU with on-going need for parenteral medications for the adjustment of blood pressure and cardiac output and maintenance of renal function.      The patient is seen for cardiogenic shock necessitating inotropic agents, vasopressors and afterload reducing agents; fluid and diuretic management to maintain blood pressure and secondary organ function    I personally reviewed:  Hemodynamic parameters obtained by central hemodynamic monitoring including RAP, estimated LVEDP, pulmonary artery pressure, cardiac output and vascular resistances in order to adjust fluids and infused medications for blood pressure and cardiac output maintenance.    Pt's cardiac output remained marginal today.  Will continue to adjust  inotropic agents, afterload reducing agents and diuretics in an attempt to optimize cardiac regimen.  Will continue advanced therapy evaluation.    Corinna Donis MD  Section Head - Advanced Heart Failure, Transplantation and Mechanical Circulatory Support  Co-Director - [facility]  Associate Professor of Medicine, [facility]    I spent 40 minutes in critical care of the patient including 20 minutes of direct patient care including serial assessments and discussion with the patient and patient's care team.        =====================================================  Physical Examination    General: AAOx3, no clubbing/cyanosis, 12 cm JVD  HEENT:  PERRL, EOMI, MMM with out pharyngeal erythema.   Cardiac: RRR. No m/r/g. Normal S1, S2. DP2+ bilaterally  Pulm:  "CTAB, no wheezes, bibasilar crackles.  Abd: +BS, soft, distended, non tender. No hepatosplenomegaly.  Skin: No rash  MSK: No deformities, no joint swelling  Extremities: 1+ pitting edema at the ankles.   Neuro: CN grossly intact, no focal deficits  Psych: Euthymic mood, full affect  =====================================================  Interval history:  No acute events overnight. Feeling better.  Good UOP. No VT.    Review of Symptoms  3-point ROS reviewed & found negative.  Skin:no  GI:no  Respiratory:no    ==================================================  Objective:  BP (!) 83/57  Pulse 65  Temp 97.4  F (36.3  C) (Oral)  Resp 18  Ht 1.727 m (5' 8\")  Wt 58.4 kg (128 lb 12 oz)  SpO2 94%  BMI 19.58 kg/m2      Vitals:    11/15/18 1503 11/16/18 0600   Weight: 60.3 kg (133 lb) 58.4 kg (128 lb 12 oz)       Wt Readings from Last 10 Encounters:   11/16/18 58.4 kg (128 lb 12 oz)   11/15/18 60.7 kg (133 lb 14.4 oz)   11/02/18 65.8 kg (145 lb)   10/11/18 72.5 kg (159 lb 12.8 oz)     I/O last 3 completed shifts:  In: 533.72 [P.O.:440; I.V.:93.72]  Out: 1200 [Urine:1200]    Medications   Current Facility-Administered Medications   Medication Dose Route Frequency     amiodarone  200 mg Oral Daily     azelastine  2 spray Both Nostrils BID     DULoxetine  30 mg Oral Daily     hydrALAZINE  25 mg Oral TID w/meals     isosorbide dinitrate  20 mg Oral TID w/meals     levothyroxine  88 mcg Oral Daily     mesalamine  2,400 mg Oral BID     montelukast  10 mg Oral QPM     omeprazole  40 mg Oral Daily     potassium chloride SA  20 mEq Oral BID     sodium chloride (PF)  20 mL Intracatheter Once     sodium chloride (PF)  3 mL Intracatheter Q8H     spironolactone  25 mg Oral Daily     tamsulosin  0.4 mg Oral Daily with supper     warfarin  5 mg Oral ONCE at 18:00     Infusions:    DOBUTamine 2.5 mcg/kg/min (11/16/18 0900)     furosemide (LASIX) infusion ADULT STANDARD 5 mg/hr (11/16/18 0900)     nitroPRUsside (NIPRIDE) IV infusion " ADULT/PEDS GREATER than or EQUAL to 45 kg std conc 1.25 mcg/kg/min (11/16/18 0900)     - MEDICATION INSTRUCTIONS -       - MEDICATION INSTRUCTIONS -       Reason ACE/ARB/ARNI order not selected       Reason beta blocker order not selected       Warfarin Therapy Reminder       PRN Medications:  acetaminophen **OR** acetaminophen, albuterol, HOLD MEDICATION, lidocaine 4%, lidocaine (buffered or not buffered), magnesium sulfate, magnesium sulfate, melatonin, naloxone, ondansetron, oxyCODONE IR, - MEDICATION INSTRUCTIONS -, - MEDICATION INSTRUCTIONS -, potassium chloride, potassium chloride with lidocaine, potassium chloride, potassium chloride, potassium chloride, Reason ACE/ARB/ARNI order not selected, Reason beta blocker order not selected, sodium chloride (PF), Warfarin Therapy Reminder  Current Facility-Administered Medications   Medication     acetaminophen (TYLENOL) tablet 650 mg    Or     acetaminophen (TYLENOL) Suppository 650 mg     albuterol (PROAIR HFA/PROVENTIL HFA/VENTOLIN HFA) 108 (90 Base) MCG/ACT inhaler 2 puff     amiodarone (PACERONE/CODARONE) tablet 200 mg     azelastine (ASTELIN) nasal spray 2 spray     DOBUTamine 500 mg in dextrose 5% 250 mL (adult std conc) premix     DULoxetine (CYMBALTA) EC capsule 30 mg     furosemide (LASIX) 100 mg in sodium chloride 0.9 % 100 mL infusion     HOLD nitroGLYcerin IF     hydrALAZINE (APRESOLINE) tablet 25 mg     isosorbide dinitrate (ISORDIL) tablet 20 mg     levothyroxine (SYNTHROID/LEVOTHROID) tablet 88 mcg     lidocaine (LMX4) cream     lidocaine 1 % 1 mL     magnesium sulfate 2 g in NS intermittent infusion ([facility] or [facility] Cmpd)     magnesium sulfate 4 g in 100 mL sterile water (premade)     melatonin tablet 1 mg     mesalamine (ASACOL HD) EC tablet 2,400 mg     montelukast (SINGULAIR) tablet 10 mg     naloxone (NARCAN) injection 0.1-0.4 mg     nitroPRUsside (NIPRIDE) 50 mg, sodium thiosulfate 500 mg in D5W 125 mL IV infusion (conc: 0.4mg/mL)     omeprazole  (priLOSEC) CR capsule 40 mg     ondansetron (ZOFRAN-ODT) ODT tab 4 mg     oxyCODONE IR (ROXICODONE) half-tab 2.5 mg     Patient is already receiving anticoagulation with heparin, enoxaparin (LOVENOX), warfarin (COUMADIN)  or other anticoagulant medication     Patient is already receiving anticoagulation with heparin, enoxaparin (LOVENOX), warfarin (COUMADIN)  or other anticoagulant medication     potassium chloride (KLOR-CON) Packet 20-40 mEq     potassium chloride 10 mEq in 100 mL intermittent infusion with 10 mg lidocaine     potassium chloride 10 mEq in 100 mL sterile water intermittent infusion (premix)     potassium chloride 20 mEq in 50 mL intermittent infusion     potassium chloride SA (K-DUR/KLOR-CON M) CR tablet 20 mEq     potassium chloride SA (K-DUR/KLOR-CON M) CR tablet 20-40 mEq     Reason ACE/ARB/ARNI order not selected     Reason beta blocker not prescribed     sodium chloride (PF) 0.9% PF flush 20 mL     sodium chloride (PF) 0.9% PF flush 3 mL     sodium chloride (PF) 0.9% PF flush 3 mL     spironolactone (ALDACTONE) tablet 25 mg     tamsulosin (FLOMAX) capsule 0.4 mg     warfarin (COUMADIN) tablet 5 mg     Warfarin Therapy Reminder (Check START DATE - warfarin may be starting in the FUTURE)         Labs:  Data   CMP  Recent Labs  Lab 11/16/18  0349 11/15/18  2005 11/15/18  0955   * 134 127*   POTASSIUM 3.6 3.7 4.7   CHLORIDE 98 100 94   CO2 26 22 24   ANIONGAP 9 11 9   * 97 128*   BUN 39* 40* 43*   CR 1.27* 1.40* 1.70*   GFRESTIMATED 59* 52* 42*   GFRESTBLACK 71 64 51*   RADAMES 8.6 8.2* 9.1   MAG 2.6*  --   --    PHOS  --   --  3.4       CBC  Recent Labs  Lab 11/16/18  0349 11/15/18  2005   WBC 7.8 7.2   RBC 5.73 5.67   HGB 16.4 16.2   HCT 50.6 49.8   MCV 88 88   MCH 28.6 28.6   MCHC 32.4 32.5   RDW 20.5* 20.4*    311     Lipid  Recent Labs   Lab Test  11/15/18   0955   CHOL  194   HDL  42   LDL  130*   TRIG  112       Arterial Blood Gas  Recent Labs  Lab 11/16/18  0741  11/16/18  0349 11/15/18  2322 11/15/18  2005   O2PER 21 21 21 21     Venous Blood Gas   Recent Labs  Lab 11/16/18  0747 11/16/18 0349 11/15/18  2322 11/15/18  2005   PHV 7.44* 7.42 7.41 7.45*   PCO2V 33* 40 38* 39*   PO2V 36 41 35 35   HCO3V 23 26 24 27   MAJOR  --  1.2  --  2.7   O2PER 21 21 21 21        Microbiology:  Data   Most Recent 6 Bacteria Isolates From Any Culture (See EPIC Reports for Culture Details):No lab results found.  No results found for: CULT     Imaging:  Data       No results found for this or any previous visit (from the past 48 hour(s)).       LINES/TUBES:  Peripheral IV 11/16/18 Left Lower forearm (Active)   Site Assessment [facility] 11/16/2018  8:00 AM   Line Status Saline locked 11/16/2018  8:00 AM   Phlebitis Scale 0-->no symptoms 11/16/2018  8:00 AM   Infiltration Scale 0 11/16/2018  8:00 AM   Infiltration Site Treatment Method  None 11/16/2018  4:00 AM   Extravasation? No 11/16/2018  4:00 AM   Number of days:0       CVC Single Lumen 11/15/18 Right Internal jugular (Active)   Site Assessment [facility] 11/16/2018  8:00 AM   Line Status Infusing 11/16/2018  8:00 AM   Dressing Intervention Chlorhexidine sponge;Transparent 11/16/2018  8:00 AM   Dressing Change Due 11/18/18 11/16/2018  8:00 AM   CVC Comment RIP 11/16/2018  8:00 AM   Number of days:1       Venous Sheath (Active)   Specific Qualities Sutured 11/16/2018  8:00 AM   Site Assessment [facility] 11/16/2018  8:00 AM   Dressing Type Biopatch;Transparent 11/16/2018  8:00 AM   Dressing Intervention Dressing changed/new dressing 11/15/2018  5:34 PM   Venous Sheath Comment RIP 11/16/2018  8:00 AM   Number of days:1       Pulmonary Artery Catheter - Single Lumen 11/15/18 1715 Right internal jugular vein standard thermodilution catheter 7 Fr (Active)   Dressing dressing dry and intact 11/16/2018  8:00 AM   Securement secured with sutures 11/16/2018  8:00 AM   PA Catheter Markings (cm) 53 11/16/2018  8:00 AM   Phlebitis 0-->no symptoms 11/16/2018  8:00 AM    Infiltration 0-->no symptoms 11/16/2018  8:00 AM   Waveform normal 11/16/2018  8:00 AM   Pressure Catheter Interventions blood specimen obtained and sent to lab;system flushed 11/16/2018  8:00 AM   Daily Review Of Necessity completed 11/16/2018  8:00 AM   Number of days:1

## 2018-11-16 NOTE — PROGRESS NOTES
LVAD Social Work Services Progress Note      Date of Initial Social Work Evaluation: not yet completed  Collaborated with: Everton & Malka 2    Data: Everton is scheduled to see DOMENIC outpatient on Monday for lvad psychosocial evaluation.   Intervention: Met with Everton to start conversation. Discussed caregiver, financial, insurance, and HCD. Everton was scheduled for PICC placement so we didn't get to finish psychosocial evaluation.   Assessment: Everton has concerns with finances as he's currently not working. He has good questions about his insurance and had planned to talk to the Senior [facility]age Line prior to this hospitalization. He currently doesn't have a caregiver plan and is unsure how this would work.   Education provided by DOMENIC: need for caregiver, insurance, finances  Plan:    Discharge Plans in Progress: pending medical progress    Barriers to d/c plan: pending medical progress    Follow up Plan: DOMENIC will plan to see Everton on Monday to complete assessment.    Pager [phone]

## 2018-11-16 NOTE — PROGRESS NOTES
CLINICAL NUTRITION SERVICES - BRIEF NOTE    Received provider consult for nutrition education for 2 g sodium diet. Pt was just seen yesterday in clinic by RD for heart transplant/LVAD eval, was given low sodium diet education.     RD will follow per LOS protocol or if re-consulted.     Amber Lemon RD, LD, [facility]  CVICU Dietitian  Pager: [phone]

## 2018-11-16 NOTE — PLAN OF CARE
Problem: Patient Care Overview  Goal: Plan of Care/Patient Progress Review  OT 4E Cx Pt eating lunch at first attempt, getting PICC at second. Will reschedule.

## 2018-11-17 ENCOUNTER — APPOINTMENT (OUTPATIENT)
Dept: CT IMAGING | Facility: CLINIC | Age: 53
DRG: 286 | End: 2018-11-17
Attending: INTERNAL MEDICINE
Payer: MEDICARE

## 2018-11-17 ENCOUNTER — APPOINTMENT (OUTPATIENT)
Dept: ULTRASOUND IMAGING | Facility: CLINIC | Age: 53
DRG: 286 | End: 2018-11-17
Attending: INTERNAL MEDICINE
Payer: MEDICARE

## 2018-11-17 ENCOUNTER — RECORDS - HEALTHEAST (OUTPATIENT)
Dept: ADMINISTRATIVE | Facility: OTHER | Age: 53
End: 2018-11-17

## 2018-11-17 LAB
ANION GAP SERPL CALCULATED.3IONS-SCNC: 10 MMOL/L (ref 3–14)
BASE DEFICIT BLDV-SCNC: 1.3 MMOL/L
BASE EXCESS BLDV CALC-SCNC: 0 MMOL/L
BASE EXCESS BLDV CALC-SCNC: 0.1 MMOL/L
BASE EXCESS BLDV CALC-SCNC: 1.7 MMOL/L
BASOPHILS # BLD AUTO: 0 10E9/L (ref 0–0.2)
BASOPHILS NFR BLD AUTO: 0.2 %
BUN SERPL-MCNC: 43 MG/DL (ref 7–30)
CALCIUM SERPL-MCNC: 9 MG/DL (ref 8.5–10.1)
CHLORIDE SERPL-SCNC: 97 MMOL/L (ref 94–109)
CO2 SERPL-SCNC: 23 MMOL/L (ref 20–32)
CREAT SERPL-MCNC: 1.2 MG/DL (ref 0.66–1.25)
DIFFERENTIAL METHOD BLD: ABNORMAL
EOSINOPHIL # BLD AUTO: 0.3 10E9/L (ref 0–0.7)
EOSINOPHIL NFR BLD AUTO: 3.4 %
ERYTHROCYTE [DISTWIDTH] IN BLOOD BY AUTOMATED COUNT: 20.2 % (ref 10–15)
GFR SERPL CREATININE-BSD FRML MDRD: 63 ML/MIN/1.7M2
GLUCOSE BLDC GLUCOMTR-MCNC: 101 MG/DL (ref 70–99)
GLUCOSE SERPL-MCNC: 98 MG/DL (ref 70–99)
HCO3 BLDV-SCNC: 23 MMOL/L (ref 21–28)
HCO3 BLDV-SCNC: 24 MMOL/L (ref 21–28)
HCO3 BLDV-SCNC: 24 MMOL/L (ref 21–28)
HCO3 BLDV-SCNC: 27 MMOL/L (ref 21–28)
HCT VFR BLD AUTO: 51.8 % (ref 40–53)
HGB BLD-MCNC: 17 G/DL (ref 13.3–17.7)
IMM GRANULOCYTES # BLD: 0.1 10E9/L (ref 0–0.4)
IMM GRANULOCYTES NFR BLD: 0.7 %
INR PPP: 1.93 (ref 0.86–1.14)
INR PPP: 1.93 (ref 0.9–1.1)
LYMPHOCYTES # BLD AUTO: 1.4 10E9/L (ref 0.8–5.3)
LYMPHOCYTES NFR BLD AUTO: 16.2 %
MAGNESIUM SERPL-MCNC: 2.5 MG/DL (ref 1.6–2.3)
MCH RBC QN AUTO: 28.7 PG (ref 26.5–33)
MCHC RBC AUTO-ENTMCNC: 32.8 G/DL (ref 31.5–36.5)
MCV RBC AUTO: 88 FL (ref 78–100)
MONOCYTES # BLD AUTO: 1.1 10E9/L (ref 0–1.3)
MONOCYTES NFR BLD AUTO: 12.5 %
NEUTROPHILS # BLD AUTO: 5.9 10E9/L (ref 1.6–8.3)
NEUTROPHILS NFR BLD AUTO: 67 %
NRBC # BLD AUTO: 0 10*3/UL
NRBC BLD AUTO-RTO: 0 /100
O2/TOTAL GAS SETTING VFR VENT: 21 %
OXYHGB MFR BLDV: 62 %
OXYHGB MFR BLDV: 64 %
OXYHGB MFR BLDV: 68 %
OXYHGB MFR BLDV: 69 %
PCO2 BLDV: 36 MM HG (ref 40–50)
PCO2 BLDV: 38 MM HG (ref 40–50)
PCO2 BLDV: 38 MM HG (ref 40–50)
PCO2 BLDV: 41 MM HG (ref 40–50)
PH BLDV: 7.41 PH (ref 7.32–7.43)
PH BLDV: 7.42 PH (ref 7.32–7.43)
PLATELET # BLD AUTO: 264 10E9/L (ref 150–450)
PO2 BLDV: 36 MM HG (ref 25–47)
PO2 BLDV: 37 MM HG (ref 25–47)
PO2 BLDV: 39 MM HG (ref 25–47)
PO2 BLDV: 40 MM HG (ref 25–47)
POTASSIUM BLD-SCNC: 4.6 MMOL/L (ref 3.4–5.3)
POTASSIUM SERPL-SCNC: 5 MMOL/L (ref 3.4–5.3)
RBC # BLD AUTO: 5.92 10E12/L (ref 4.4–5.9)
SODIUM SERPL-SCNC: 130 MMOL/L (ref 133–144)
WBC # BLD AUTO: 8.7 10E9/L (ref 4–11)

## 2018-11-17 PROCEDURE — 93925 LOWER EXTREMITY STUDY: CPT

## 2018-11-17 PROCEDURE — 25000128 H RX IP 250 OP 636: Performed by: INTERNAL MEDICINE

## 2018-11-17 PROCEDURE — 80048 BASIC METABOLIC PNL TOTAL CA: CPT | Performed by: INTERNAL MEDICINE

## 2018-11-17 PROCEDURE — 25000132 ZZH RX MED GY IP 250 OP 250 PS 637: Mod: GY | Performed by: INTERNAL MEDICINE

## 2018-11-17 PROCEDURE — 85613 RUSSELL VIPER VENOM DILUTED: CPT | Performed by: INTERNAL MEDICINE

## 2018-11-17 PROCEDURE — 83735 ASSAY OF MAGNESIUM: CPT | Performed by: INTERNAL MEDICINE

## 2018-11-17 PROCEDURE — 25000131 ZZH RX MED GY IP 250 OP 636 PS 637: Mod: GY | Performed by: INTERNAL MEDICINE

## 2018-11-17 PROCEDURE — 00000146 ZZHCL STATISTIC GLUCOSE BY METER IP

## 2018-11-17 PROCEDURE — A9270 NON-COVERED ITEM OR SERVICE: HCPCS | Mod: GY | Performed by: INTERNAL MEDICINE

## 2018-11-17 PROCEDURE — 25000125 ZZHC RX 250: Performed by: INTERNAL MEDICINE

## 2018-11-17 PROCEDURE — 84132 ASSAY OF SERUM POTASSIUM: CPT | Performed by: INTERNAL MEDICINE

## 2018-11-17 PROCEDURE — 85730 THROMBOPLASTIN TIME PARTIAL: CPT | Performed by: INTERNAL MEDICINE

## 2018-11-17 PROCEDURE — A9270 NON-COVERED ITEM OR SERVICE: HCPCS | Mod: GY | Performed by: STUDENT IN AN ORGANIZED HEALTH CARE EDUCATION/TRAINING PROGRAM

## 2018-11-17 PROCEDURE — 85597 PHOSPHOLIPID PLTLT NEUTRALIZ: CPT | Performed by: INTERNAL MEDICINE

## 2018-11-17 PROCEDURE — 25000128 H RX IP 250 OP 636: Performed by: STUDENT IN AN ORGANIZED HEALTH CARE EDUCATION/TRAINING PROGRAM

## 2018-11-17 PROCEDURE — 82805 BLOOD GASES W/O2 SATURATION: CPT | Performed by: INTERNAL MEDICINE

## 2018-11-17 PROCEDURE — 70450 CT HEAD/BRAIN W/O DYE: CPT

## 2018-11-17 PROCEDURE — 76700 US EXAM ABDOM COMPLETE: CPT

## 2018-11-17 PROCEDURE — 99291 CRITICAL CARE FIRST HOUR: CPT | Mod: GC | Performed by: INTERNAL MEDICINE

## 2018-11-17 PROCEDURE — 85610 PROTHROMBIN TIME: CPT | Performed by: INTERNAL MEDICINE

## 2018-11-17 PROCEDURE — 20000004 ZZH R&B ICU UMMC

## 2018-11-17 PROCEDURE — 25000132 ZZH RX MED GY IP 250 OP 250 PS 637: Mod: GY | Performed by: STUDENT IN AN ORGANIZED HEALTH CARE EDUCATION/TRAINING PROGRAM

## 2018-11-17 PROCEDURE — 85732 THROMBOPLASTIN TIME PARTIAL: CPT | Performed by: INTERNAL MEDICINE

## 2018-11-17 PROCEDURE — 85025 COMPLETE CBC W/AUTO DIFF WBC: CPT | Performed by: INTERNAL MEDICINE

## 2018-11-17 PROCEDURE — 00000401 ZZHCL STATISTIC THROMBIN TIME NC: Performed by: INTERNAL MEDICINE

## 2018-11-17 RX ORDER — POTASSIUM CHLORIDE 750 MG/1
40 TABLET, EXTENDED RELEASE ORAL 2 TIMES DAILY
Status: DISCONTINUED | OUTPATIENT
Start: 2018-11-17 | End: 2018-11-19

## 2018-11-17 RX ORDER — MULTIPLE VITAMINS W/ MINERALS TAB 9MG-400MCG
1 TAB ORAL DAILY
Status: DISCONTINUED | OUTPATIENT
Start: 2018-11-17 | End: 2018-11-23 | Stop reason: HOSPADM

## 2018-11-17 RX ORDER — POTASSIUM CHLORIDE 750 MG/1
20 TABLET, EXTENDED RELEASE ORAL 2 TIMES DAILY
Status: DISCONTINUED | OUTPATIENT
Start: 2018-11-17 | End: 2018-11-17

## 2018-11-17 RX ORDER — ONDANSETRON 2 MG/ML
4 INJECTION INTRAMUSCULAR; INTRAVENOUS EVERY 6 HOURS PRN
Status: DISCONTINUED | OUTPATIENT
Start: 2018-11-17 | End: 2018-11-23 | Stop reason: HOSPADM

## 2018-11-17 RX ORDER — WARFARIN SODIUM 5 MG/1
5 TABLET ORAL
Status: COMPLETED | OUTPATIENT
Start: 2018-11-17 | End: 2018-11-17

## 2018-11-17 RX ORDER — ONDANSETRON 2 MG/ML
4 INJECTION INTRAMUSCULAR; INTRAVENOUS EVERY 6 HOURS PRN
Status: COMPLETED | OUTPATIENT
Start: 2018-11-17 | End: 2018-11-17

## 2018-11-17 RX ADMIN — SPIRONOLACTONE 50 MG: 25 TABLET ORAL at 09:56

## 2018-11-17 RX ADMIN — PROCHLORPERAZINE EDISYLATE 5 MG: 5 INJECTION INTRAMUSCULAR; INTRAVENOUS at 07:42

## 2018-11-17 RX ADMIN — Medication 37.5 MG: at 11:57

## 2018-11-17 RX ADMIN — MESALAMINE 2400 MG: 800 TABLET, DELAYED RELEASE ORAL at 20:26

## 2018-11-17 RX ADMIN — ACETAMINOPHEN 650 MG: 325 TABLET, FILM COATED ORAL at 11:59

## 2018-11-17 RX ADMIN — SODIUM NITROPRUSSIDE 0.2 MCG/KG/MIN: 25 INJECTION INTRAVENOUS at 12:20

## 2018-11-17 RX ADMIN — Medication 37.5 MG: at 18:26

## 2018-11-17 RX ADMIN — FUROSEMIDE 5 MG/HR: 10 INJECTION, SOLUTION INTRAVENOUS at 17:01

## 2018-11-17 RX ADMIN — ONDANSETRON 4 MG: 2 INJECTION INTRAMUSCULAR; INTRAVENOUS at 16:54

## 2018-11-17 RX ADMIN — PROCHLORPERAZINE EDISYLATE 5 MG: 5 INJECTION INTRAMUSCULAR; INTRAVENOUS at 00:10

## 2018-11-17 RX ADMIN — ACETAMINOPHEN 650 MG: 325 TABLET, FILM COATED ORAL at 16:55

## 2018-11-17 RX ADMIN — PROCHLORPERAZINE EDISYLATE 5 MG: 5 INJECTION INTRAMUSCULAR; INTRAVENOUS at 20:28

## 2018-11-17 RX ADMIN — MESALAMINE 2400 MG: 800 TABLET, DELAYED RELEASE ORAL at 09:51

## 2018-11-17 RX ADMIN — PROCHLORPERAZINE EDISYLATE 5 MG: 5 INJECTION INTRAMUSCULAR; INTRAVENOUS at 14:35

## 2018-11-17 RX ADMIN — LEVOTHYROXINE SODIUM 88 MCG: 88 TABLET ORAL at 08:26

## 2018-11-17 RX ADMIN — ONDANSETRON 4 MG: 4 TABLET, ORALLY DISINTEGRATING ORAL at 08:56

## 2018-11-17 RX ADMIN — ISOSORBIDE DINITRATE 30 MG: 30 TABLET ORAL at 11:57

## 2018-11-17 RX ADMIN — DULOXETINE 20 MG: 20 CAPSULE, DELAYED RELEASE ORAL at 09:51

## 2018-11-17 RX ADMIN — Medication 37.5 MG: at 08:28

## 2018-11-17 RX ADMIN — AZELASTINE HYDROCHLORIDE 2 SPRAY: 137 SPRAY, METERED NASAL at 08:29

## 2018-11-17 RX ADMIN — WARFARIN SODIUM 5 MG: 5 TABLET ORAL at 18:30

## 2018-11-17 RX ADMIN — TAMSULOSIN HYDROCHLORIDE 0.4 MG: 0.4 CAPSULE ORAL at 18:30

## 2018-11-17 RX ADMIN — POTASSIUM CHLORIDE 40 MEQ: 750 TABLET, EXTENDED RELEASE ORAL at 20:27

## 2018-11-17 RX ADMIN — AZELASTINE HYDROCHLORIDE 2 SPRAY: 137 SPRAY, METERED NASAL at 20:28

## 2018-11-17 RX ADMIN — ONDANSETRON 4 MG: 2 INJECTION INTRAMUSCULAR; INTRAVENOUS at 23:08

## 2018-11-17 RX ADMIN — ISOSORBIDE DINITRATE 30 MG: 30 TABLET ORAL at 08:26

## 2018-11-17 RX ADMIN — MONTELUKAST SODIUM 10 MG: 10 TABLET, COATED ORAL at 20:26

## 2018-11-17 RX ADMIN — ACETAMINOPHEN 650 MG: 325 TABLET, FILM COATED ORAL at 08:25

## 2018-11-17 RX ADMIN — Medication 1 MG: at 20:37

## 2018-11-17 RX ADMIN — OMEPRAZOLE 40 MG: 20 CAPSULE, DELAYED RELEASE ORAL at 09:56

## 2018-11-17 RX ADMIN — AMIODARONE HYDROCHLORIDE 200 MG: 200 TABLET ORAL at 09:50

## 2018-11-17 RX ADMIN — ISOSORBIDE DINITRATE 30 MG: 30 TABLET ORAL at 18:29

## 2018-11-17 ASSESSMENT — ACTIVITIES OF DAILY LIVING (ADL)
ADLS_ACUITY_SCORE: 11

## 2018-11-17 ASSESSMENT — PAIN DESCRIPTION - DESCRIPTORS: DESCRIPTORS: ACHING

## 2018-11-17 NOTE — PROGRESS NOTES
Cardiology Progress Note    Overnight/subj:   No events overnight     VS reviewed: Notable for temp: AF, HR 70-67, MAP 74-80, oxygenating on 95 on RA  Tele: no events   [place]: CVP 10, PA 42/24  CO 3.1 (1.9), SVR 1850, SVO2 62    Wt: on admit 60.3, yday 58.4, today 57.1  I/O: 1.8 in / 3.1 out yday. Since  in / 800 out    Drips:   Lasix gtt 5  Dobutamine 2.5  Nipride 0.25    PO Cardiac Meds: Amiodorone 200mg daily, hydralazine 37.5 TID, Isordil 30mg TID, KCL 40mg BID, Spironolactone 50 daily, warfarin 5mg daily     Other meds: Azelastine 2sprays BID, Duloxetine 20mg daily, Levothyroxine 88mcg daily, mesalamine 2.4g bid, omeprazole 40mg daily, flomax 0.4mg daily    Radiology Imaging  1. The right IJ [place]-Liz catheter tip projects over the main  pulmonary artery, retracted since the previous exam.  2. Minimal streaky left basilar atelectasis.    Cardiology Studies:  TTE  Interpretation Summary  Severely (EF 10-20%) reduced left ventricular function is present. LVIDd: 6.3  cm. Severe diffuse hypokinesis is present. Biplane EF is 17%. LV apical  hypertrabeculation noted with no evidence of thrombus.  Grade III or advanced diastolic dysfunction.  Global right ventricular function is moderately reduced.  The right ventricle is normal size.There is moderate right ventricular  hypertrophy.  Severe biatrial enlargement is present.  Mild mitral insufficiency is present.  No pericardial effusion is present.    Labs: Reviewed in epic    Phys exam:  GEN: NAD  Pulm: ctab  Cardiac: rrr, no murmurs  Vascular: - ESTELLA and +2 pulses  GI: soft, non distended    ASSESSMENT/PLAN:  Everton azevedo is a 52yo M pAF s/p ablation, NICM (EF 15%)s/p AICD, ASD s/p repair with persistent left SVC who is presenting with decompensated heart failure and cardiogenic shock requiring inotropes.     # Adance heart failure s/p CRT-D  # NICM  # p Afib  - Continue dobutamine  - wean nipride   - Continue hydralazine and isordil, will increase after 5 dose if  BP tolerates  - continue holding BB   - Continue  advance therapy work up, though patient may need to discharge with home inotropes as he tries to identify care giver plan needed for LVAD   - PICC placement by IR on Monday      # UC  - continue home medications      Patient seen and discussed with Dr. Gagandeep Templeton MD  Cardiology Fellow  [phone]      Critical Care ICU Note - Cardiology  Corinna Donis M.D.    The patient remains in the ICU with on-going need for parenteral medications for the adjustment of blood pressure and cardiac output and maintenance of renal function.      The patient is seen for cardiogenic shock necessitating inotropic agents, vasopressors and afterload reducing agents; fluid and diuretic management to maintain blood pressure and secondary organ function    I personally reviewed:  Hemodynamic parameters obtained by central hemodynamic monitoring including RAP, estimated LVEDP, pulmonary artery pressure, cardiac output and vascular resistances in order to adjust fluids and infused medications for blood pressure and cardiac output maintenance.    Pt's hemodynamics improved today however remains inotrope dependent.  Will continue transition from parenteral to oral afterload reducing agents.  Continue with advanced therapy evaluation.  At present patient unable to determine care giver option required for LVAD.  Even if determined that he would be at point to be an appropriate advanced therapy candidate, may need to discharge with home inotropes to allow time for patient to address psychosocial concerns.      Corinna Donis MD  Section Head - Advanced Heart Failure, Transplantation and Mechanical Circulatory Support  Co-Director - Adult Congenital and Cardiovascular Genetics Center  Associate Professor of Medicine, [facility]    I spent 40 minutes in critical care of the patient including 20 minutes of direct patient care including serial assessments and discussion with  the patient and patient's care team.

## 2018-11-17 NOTE — PROGRESS NOTES
"CLINICAL NUTRITION SERVICES - ASSESSMENT NOTE     Nutrition Prescription    RECOMMENDATIONS FOR MDs/PROVIDERS TO ORDER:  1. Consider appetite stimulant (Remeron, Marinol, or Megace) to assist with improvement of PO intake.    2. Recommend considering supplementation of Thiamin 100 mg daily with EF < 15%.    Malnutrition Status:    Severe malnutrition in the context of chronic illness or disease.    Recommendations already ordered by Registered Dietitian (RD):  Calorie Counts   Thera-Vit-M   Boost Glucose Control (vanilla) @ 10 am and 2 pm   Gelatin Plus (cherry) and Gelatein SF (orange) @ 8 pm     Future/Additional Recommendations:  1. Calorie counts to meet at least 60% of higher end of estimated energy needs (1026 kcals and 52 g PRO). If needs are not met, recommend further MNT interventions as follows: re-educate on high calorie/high protein diet, offer variation in ONS and scheduled snacks, and TFs (if a part of the nutrition POC).      REASON FOR ASSESSMENT  Everton Larios is a/an 53 year old male assessed by the dietitian for RN Consult - \"lack of appetite, weight loss in admission trigger\"    NUTRITION HISTORY  Per OP RD visit on 11/15/18:   - \"Pt reports reading labels and trying to follow a low sodium diet, <2000 mg/day. He does not add salt when he cooks. He reports being more strict with sodium intake since last hospitalization, 8/2018. He reports vomiting 1-2x/week. The only thing he can correlate this to is sweet foods and even his toothpaste, requiring him to trial other toothpaste. He has tried Ensure before, but reports this is too sweet. He reports abdominal edema (sounds like ascites) for several years, which no one can figure out. This is affecting his appetite, PO, etc. He has lost significant amount of weight over the years and continues to drop weight.\"       Information obtained from patient:  - Patient endorsed that it is difficult to gain weight at this time given his lack of appetite. " "He reported that his barriers to consuming adequate nutrition are N/V, early satiety and complete lack of appetite.     - Patient endorsed aversion to sweet foods.     CURRENT NUTRITION ORDERS  Diet: 2 g Sodium and 2000 mL Fluid Restriction  Intake/Tolerance: No intake recorded at this time per RN flowsheet.     LABS  Na: 130 (L)   BUN: 43 (H)   M.5 (H)   GFR: 63 (WNL)     MEDICATIONS  Aldactone   Lasix   Dobutamine in D5 250 ml solution   Zofran (oral, PRN)     ANTHROPOMETRICS  Height: 172.7 cm (5' 8\")  Most Recent Weight: 57.1 kg (125 lb 14.1 oz)    IBW: 70 kg  BMI: Normal BMI  Weight History: 21% weight loss in 1 month. Suspect partially fluid related d/t ongoing diuresis, however, suspect dry weight loss as well.   Wt Readings from Last 10 Encounters:   18 57.1 kg (125 lb 14.1 oz)   11/15/18 60.7 kg (133 lb 14.4 oz)   18 65.8 kg (145 lb)   10/11/18 72.5 kg (159 lb 12.8 oz)       Dosing Weight: 57 kg (driest admission weight on )     ASSESSED NUTRITION NEEDS  Estimated Energy Needs: 1425 - 1710 kcals/day (25 - 30+ kcals/kg)  Justification: Maintenance  Estimated Protein Needs: 68 - 86 grams protein/day (1.2 - 1.5 grams of pro/kg)  Justification: Repletion  Estimated Fluid Needs: 1 mL/kcal   Justification: Maintenance and Per provider pending fluid status    PHYSICAL FINDINGS  See malnutrition section below.    MALNUTRITION  % Intake: </=75% for >/= 1 month (severe)  % Weight Loss: > 5% in 1 month (severe)  Subcutaneous Fat Loss: Facial region, Upper arm, Lower arm and Thoracic/intercostal: Moderate-severe   Muscle Loss: Temporal, Facial & jaw region, Scapular bone, Thoracic region (clavicle, acromium bone, deltoid, trapezius, pectoral), Upper arm (bicep, tricep), Lower arm  (forearm), Dorsal hand, Upper leg (quadricep, hamstring), Patellar region and Posterior calf: Moderate   Fluid Accumulation/Edema: None noted  Malnutrition Diagnosis: Severe malnutrition in the context of chronic illness " or disease.     NUTRITION DIAGNOSIS  Inadequate protein-energy intake related to lack of appetite 2/2 N/V and early satiety as evidenced by patient consuming less than 75% of estimated needs over the past 1-2 months, 21% weight loss in 1 month (partailly fluid related) and observed fat & muscle loss.       INTERVENTIONS  Implementation  Nutrition Education: Provided education on eating small/frequent calorically dense meals, importance of adequate PO intake, option to start appetite stimulant if ongoing poor PO intake or scheduled Zofran prior to each meal, available ONS while inpatient as well as options when discharged and role of RD.    Provided handouts on: Tips for Increasing Protein, High calorie and High Protein Meal Plan and High Calorie & High Protein Recipes. Also provided patient with coupons for Boost GC, Ensure and Orgain.   Medical food supplement therapy (see above)   Multivitamin/mineral supplement therapy (see above)   Calorie Counts     Goals  Total avg nutritional intake to meet a minimum of 25 kcal/kg and 1.2 g PRO/kg daily (per dosing wt 57 kg).     Monitoring/Evaluation  Progress toward goals will be monitored and evaluated per protocol.      Nisha Parson RD, LD   Weekend pager [phone]

## 2018-11-17 NOTE — PLAN OF CARE
Problem: Patient Care Overview  Goal: Plan of Care/Patient Progress Review  PT 4E: Holding - PT orders acknowledged and appreciated. Pt declining activity with OT this morning. Pt has been ambulating and will likely only require 1 discipline to follow. Will continue to monitor his status and initiate as indicated. Thank you for your referral.

## 2018-11-17 NOTE — PLAN OF CARE
Problem: Patient Care Overview  Goal: Plan of Care/Patient Progress Review  Outcome: Improving                Weaned Nipride to 0.25 SBP 80-90's, MAP goal maintained >65. PA 42/24 CVP 10 CI 1.9 CO 3.1.             1600cc UOP fron 7p-7a. Net neg 600. NPO for ABD/Carotid US today.             Given APAP/oxy for PAC site tenderness and Compazine for nausea with relief.            Continue to monitor and intervene as indicated.

## 2018-11-17 NOTE — PROGRESS NOTES
PICC, DL, LUE, chest Xray showed PICC catheter threaded down into Left SVC,MD RADIOLOGIST said it is arterial entry. PICC pulled out per protocol. Ten minutes pressure applied on exit site. Patient tolerated procedure.

## 2018-11-17 NOTE — PLAN OF CARE
Problem: Patient Care Overview  Goal: Plan of Care/Patient Progress Review  OT: Pt adamantly refused OT/CR today 2/2 fatigue, OT will check back in PM as available and appropriate or reschedule for 11/18/1/8

## 2018-11-17 NOTE — PROGRESS NOTES
"Called and spoke to primary nurse Tammi regarding PICC placement and PICC attempt yesterday as well as radiology findings. She tells me, \"Yeah, Everton just told me ' have ASD and other weird anatomy up there I guess.'\" Have been referred to the resident, Madeleine Negrete. Upon collaboration she agrees that an IR referral would be appropriate given that the right arm is not an option and abnormal anatomy was found upon PICC attempt yesterday; this resulted in removal of the PICC on the left.  "

## 2018-11-17 NOTE — CONSULTS
INTERVENTIONAL RADIOLOGY CONSULT    Received consult to place PICC.    Lab Results   Component Value Date    HGB 17.0 11/17/2018     Lab Results   Component Value Date     11/17/2018     Lab Results   Component Value Date    WBC 8.7 11/17/2018       Lab Results   Component Value Date    INR 1.93 11/17/2018     Lab Results   Component Value Date    INR 1.93 11/17/2018    INR 2.04 11/16/2018     Lab Results   Component Value Date     11/17/2018     11/16/2018         Lab Results   Component Value Date    PROTTOTAL 7.3 11/02/2018      Lab Results   Component Value Date    ALBUMIN 3.2 11/02/2018     Lab Results   Component Value Date    BILITOTAL 1.9 11/02/2018     No results found for: BILICONJ   Lab Results   Component Value Date    ALKPHOS 180 11/02/2018     Lab Results   Component Value Date    AST 24 11/02/2018     Lab Results   Component Value Date    ALT 27 11/02/2018       Lab Results   Component Value Date    CR 1.20 11/17/2018     No components found for: GFRE  No results found for: FETO      Assessment/Plan  54 yo M patient with with history of atrial fibrillation and atrial fibrillation ablation, who presented after RHC for inotrope support and accelerated LVAD assessment. Needs PICC for IV medications, planning to use it for dobutamine. Has a persistent left IVC, vascular access attempted to place left PICC, and tip ended up in left coronary sinus. Has right chest ICD. Team would like IR to place PICC due to anatomy and existing lines. Discussed with treatment team, plan to place on list and review case Monday morning for placement at earliest availability.    Discussed with Dr. Taylor.    Paddy Patrick MD  Radiology Resident  Pager [phone]  Call Pager for After Hours: [phone]

## 2018-11-17 NOTE — PLAN OF CARE
Problem: Patient Care Overview  Goal: Plan of Care/Patient Progress Review  Outcome: Declining  Cardiac index decreased to 1.4 at 1600, Dr. Donis notified. Patient became nauseas, zofran & compazine given. Isurdil & hydralazine started & increased with hopes to wean Nipride. Pain in RIJ at swan insertion site, heat applied. Ambulated in hallways x 1. Continuing lvad workup, will be NPO at midnight for abdominal us in am.  picc line placed, per radiology not in appropriate spot, picc rn pulled immediately. Will continue to monitor and follow plan of care.

## 2018-11-17 NOTE — CONSULTS
[facility] CARDIOTHORACIC SURGERY CONSULT     Everton Larios MRN# [id number]   YOB: 1965 Age: 53 year old      Date of Admission:  11/15/2018    Primary care provider: Fredrick Wolff         Chief Complaint:   Heart failure - evaluate for LVAD     History of Present Illness: Everton Larios is a 53 year old male admitted with heart failure exacerbation.  He has a remote history of ASD repair as a child.  He has had atrial fibrillation and ablation.  He has recent history of moderate ETOH use.  He has been abstinent from ETOH for 1 month after recommendation from Dr. Beck.    He is seen today in his ICU room.  He appears comfortable and is conversant.  He is interested in LVAD, but is concerned about coordinating a care taker.           Assessment and Plan:   Everton Larios is a 53 year old male with idiopathic heart failure  1) Agree with completing heart failure evaluation - I feel he is a reasonable candidate for LVAD therapy  2) Will discuss his case with the heart failure team   3) Please call with questions or concerns.     Thank you for the opportunity to participate in the care of this patient.           Dewayne House MD  Cardiothoracic Surgery  [phone]                Past Medical History:     Past Medical History:   Diagnosis Date     Pierce's esophagus 10/4/2018     Chronic rhinitis 10/4/2018     Chronic systolic heart failure (H) 10/4/2018     Depression 10/4/2018     DJD (degenerative joint disease) - neck 10/4/2018     H/O congenital atrial septal defect (ASD) repair at age 3 10/4/2018     Hypothyroidism due to medication 10/4/2018     ICD, [place] [employer] 2008; gen change 2/2018 10/4/2018     Intermittent asthma without complication 10/4/2018     Nonischemic cardiomyopathy (H) 10/4/2018     On amiodarone therapy 10/4/2018     Paroxysmal atrial fibrillation (H) 10/4/2018     S/P ablation of atrial fibrillation 10/4/2018     Ulcerative colitis (H) 10/4/2018      Ventricular tachycardia (H) 10/4/2018               Past Surgical History:   - ASD closure as a child          Social History:     Social History     Social History     Marital status: Single     Spouse name: N/A     Number of children: N/A     Years of education: N/A     Occupational History     Not on file.     Social History Main Topics     Smoking status: Former Smoker     Years: 10.00     Smokeless tobacco: Never Used     Alcohol use 0.6 oz/week     1 Cans of beer per week      Comment: a couple times a week      Drug use: No     Sexual activity: Not on file     Other Topics Concern     Not on file     Social History Narrative               Family History:   History reviewed. No pertinent family history.           Allergies:      Allergies   Allergen Reactions     Lisinopril Other (See Comments)     hypotension     Codeine Nausea               Medications:     Current Facility-Administered Medications   Medication     acetaminophen (TYLENOL) tablet 650 mg    Or     acetaminophen (TYLENOL) Suppository 650 mg     albuterol (PROAIR HFA/PROVENTIL HFA/VENTOLIN HFA) 108 (90 Base) MCG/ACT inhaler 2 puff     amiodarone (PACERONE/CODARONE) tablet 200 mg     azelastine (ASTELIN) nasal spray 2 spray     DOBUTamine 500 mg in dextrose 5% 250 mL (adult std conc) premix     DULoxetine (CYMBALTA) EC capsule 20 mg     furosemide (LASIX) 100 mg in sodium chloride 0.9 % 100 mL infusion     heparin lock flush 10 UNIT/ML injection 2-5 mL     heparin lock flush 10 UNIT/ML injection 5-10 mL     heparin lock flush 10 UNIT/ML injection 5-10 mL     HOLD nitroGLYcerin IF     hydrALAZINE (APRESOLINE) tablet 37.5 mg     isosorbide dinitrate (ISORDIL) tablet 30 mg     levothyroxine (SYNTHROID/LEVOTHROID) tablet 88 mcg     lidocaine (LMX4) cream     lidocaine (LMX4) cream     lidocaine 1 % 1 mL     magnesium sulfate 2 g in NS intermittent infusion ([employer] or [facility] C[employer])     magnesium sulfate 4 g in 100 mL sterile water (premade)      melatonin tablet 1 mg     mesalamine (ASACOL HD) EC tablet 2,400 mg     montelukast (SINGULAIR) tablet 10 mg     multivitamin, therapeutic with minerals (THERA-VIT-M) tablet 1 tablet     naloxone (NARCAN) injection 0.1-0.4 mg     nitroPRUsside (NIPRIDE) 50 mg, sodium thiosulfate 500 mg in D5W 125 mL IV infusion (conc: 0.4mg/mL)     omeprazole (priLOSEC) CR capsule 40 mg     ondansetron (ZOFRAN-ODT) ODT tab 4 mg     Patient is already receiving anticoagulation with heparin, enoxaparin (LOVENOX), warfarin (COUMADIN)  or other anticoagulant medication     Patient is already receiving anticoagulation with heparin, enoxaparin (LOVENOX), warfarin (COUMADIN)  or other anticoagulant medication     potassium chloride (KLOR-CON) Packet 20-40 mEq     potassium chloride 10 mEq in 100 mL intermittent infusion with 10 mg lidocaine     potassium chloride 10 mEq in 100 mL sterile water intermittent infusion (premix)     potassium chloride 20 mEq in 50 mL intermittent infusion     potassium chloride SA (K-DUR/KLOR-CON M) CR tablet 20-40 mEq     potassium chloride SA (K-DUR/KLOR-CON M) CR tablet 40 mEq     prochlorperazine (COMPAZINE) injection 5 mg    Or     prochlorperazine (COMPAZINE) tablet 5-10 mg    Or     prochlorperazine (COMPAZINE) Suppository 25 mg     Reason ACE/ARB/ARNI order not selected     Reason beta blocker not prescribed     sodium chloride (PF) 0.9% PF flush 10-20 mL     sodium chloride (PF) 0.9% PF flush 20 mL     sodium chloride (PF) 0.9% PF flush 3 mL     sodium chloride (PF) 0.9% PF flush 3 mL     spironolactone (ALDACTONE) tablet 50 mg     tamsulosin (FLOMAX) capsule 0.4 mg     warfarin (COUMADIN) tablet 5 mg     Warfarin Therapy Reminder (Check START DATE - warfarin may be starting in the FUTURE)             Review of Systems:   A 10 point ROS was performed and is negative other than HPI.          Physical Exam:      Temp:  [97.4  F (36.3  C)-98  F (36.7  C)] 97.4  F (36.3  C)  Heart Rate:  [60-78] 68  Resp:   [14-18] 15  BP: ()/(37-87) 104/74  SpO2:  [92 %-98 %] 95 %    Gen: NAD, resting comfortably in bed  Lungs: CTAB, non-labored breathing  CV: regular rhythm, normal rate  Abd: soft, not tender, not distended  Ext: motor, sensation, pulses intact  Neuro: AOx3    Labs:  ABG No lab results found in last 7 days.  CBC  Recent Labs  Lab 11/17/18  0405 11/16/18  0349 11/15/18  2005   WBC 8.7 7.8 7.2   HGB 17.0 16.4 16.2    266 311     BMP  Recent Labs  Lab 11/17/18  1209 11/17/18  0405 11/16/18  2112 11/16/18  1636  11/16/18  0349 11/15/18  2005   NA  --  130*  --  130*  --  132* 134   POTASSIUM 4.6 5.0 4.4 4.4  < > 3.6 3.7   CHLORIDE  --  97  --  99  --  98 100   CO2  --  23  --  21  --  26 22   BUN  --  43*  --  42*  --  39* 40*   CR  --  1.20  --  1.14  --  1.27* 1.40*   GLC  --  98  --  97  --  103* 97   < > = values in this interval not displayed.  LFT  Recent Labs  Lab 11/17/18  0405 11/16/18  0512 11/16/18  0349 11/15/18  2005   INR 1.93* 2.04* Canceled, Test credited 2.09*     PancreasNo lab results found in last 7 days.    Imaging:  Transthoracic echocardiogram (11/16/18):  Interpretation Summary  Severely (EF 10-20%) reduced left ventricular function is present. LVIDd: 6.3  cm. Severe diffuse hypokinesis is present. Biplane EF is 17%. LV apical  hypertrabeculation noted with no evidence of thrombus.  Grade III or advanced diastolic dysfunction.  Global right ventricular function is moderately reduced.  The right ventricle is normal size.There is moderate right ventricular  hypertrophy.  Severe biatrial enlargement is present.  Mild mitral insufficiency is present.  No pericardial effusion is present.  Previous study not available for comparison.     _____________________________________________________________________________  __        Left Ventricle  Left ventricular size is normal. EDV (BP) is normal at 131 ml. Mild eccentric  hypertrophy. Severely (EF 10-20%) reduced left ventricular function  is  present. Grade III or advanced diastolic dysfunction. Biplane EF is 17%.  Severe diffuse hypokinesis is present.     Right Ventricle  The right ventricle is normal size. There is moderate right ventricular  hypertrophy. Global right ventricular function is moderately reduced. A  pacemaker lead is noted in the right ventricle.     Atria  Severe biatrial enlargement is present. The atrial septum is intact as  assessed by color Doppler .     Mitral Valve  Mild mitral annular calcification is present. Mild mitral insufficiency is  present.        Aortic Valve  Aortic valve is normal in structure and function. The aortic valve is  tricuspid.     Tricuspid Valve  Trace tricuspid insufficiency is present. The right ventricular systolic  pressure is approximated at 11.2 mmHg plus the right atrial pressure.     Pulmonic Valve  The pulmonic valve is normal.     Vessels  The aorta root is normal. The inferior vena cava was normal in size with  preserved respiratory variability. The pulmonary artery and bifurcation cannot  be assessed. Estimated mean right atrial pressure is 3 mmHg (normal).     Pericardium  No pericardial effusion is present.        Compared to Previous Study  Previous study not available for comparison.  _____________________________________________________________________________  __  MMode/2D Measurements & Calculations     IVSd: 0.84 cm  LVIDd: 6.3 cm  LVIDs: 5.5 cm  LVPWd: 0.82 cm  FS: 12.9 %  LV mass(C)d: 215.4 grams  LV mass(C)dI: 125.3 grams/m2  MV Diam: 3.6 cm  Ao root diam: 3.1 cm  LA dimension: 5.7 cm  asc Aorta Diam: 3.1 cm  LA/Ao: 1.8  LVOT diam: 2.4 cm  LVOT area: 4.5 cm2  LA Volume (BP): 210.0 ml  LA Volume Index (BP): 122.1 ml/m2     RWT: 0.26        Doppler Measurements & Calculations  MV E max sandro: 105.0 cm/sec  MV Flow area(1diam): 10.2 cm2  Ao V2 max: 68.4 cm/sec  Ao max P.0 mmHg  Ao V2 mean: 54.2 cm/sec  Ao mean P.0 mmHg  Ao V2 VTI: 12.0 cm  ALONZO(I,D): 2.6 cm2  ALONZO(V,D): 3.0  cm2  LV V1 max P.83 mmHg  LV V1 max: 45.5 cm/sec  LV V1 VTI: 6.8 cm  SV(LVOT): 30.7 ml  SI(LVOT): 17.8 ml/m2  PA acc time: 0.07 sec  TR max ata: 164.0 cm/sec  TR max P.2 mmHg  AV Ata Ratio (DI): 0.67  ALONZO Index (cm2/m2): 1.5       Coronary angiogram - Pending    Right heart cath (11/15/18):  HEMODYNAMICS:  BSA 1.7 m2  1. HR 60 bpm  2. /74/85 mmHg  3. RA 14/13/12   4. RV 57/12  5. PA 57/23/37   6. PCW 28/31/28   7. PA sat 52%   8. PCW sat 94%  9. Hgb 16.7 g/dL   10. Jaden CO 2.0   11. Jaden CI 1.2   12. TD CO 2.9   13. TD CI 1.5  14. PVR 4.6     CT Chest - Pending    Bilateral Carotid Arterial Duplex - Pending      Six minute walk test (11/15/18):  670 ft / 204 m

## 2018-11-18 ENCOUNTER — APPOINTMENT (OUTPATIENT)
Dept: GENERAL RADIOLOGY | Facility: CLINIC | Age: 53
DRG: 286 | End: 2018-11-18
Attending: INTERNAL MEDICINE
Payer: MEDICARE

## 2018-11-18 ENCOUNTER — APPOINTMENT (OUTPATIENT)
Dept: OCCUPATIONAL THERAPY | Facility: CLINIC | Age: 53
DRG: 286 | End: 2018-11-18
Attending: INTERNAL MEDICINE
Payer: MEDICARE

## 2018-11-18 LAB
ANION GAP SERPL CALCULATED.3IONS-SCNC: 12 MMOL/L (ref 3–14)
BASE DEFICIT BLDV-SCNC: 0.8 MMOL/L
BASE DEFICIT BLDV-SCNC: 1.7 MMOL/L
BASE DEFICIT BLDV-SCNC: 2.1 MMOL/L
BASOPHILS # BLD AUTO: 0 10E9/L (ref 0–0.2)
BASOPHILS NFR BLD AUTO: 0.1 %
BUN SERPL-MCNC: 44 MG/DL (ref 7–30)
CALCIUM SERPL-MCNC: 9 MG/DL (ref 8.5–10.1)
CHLORIDE SERPL-SCNC: 94 MMOL/L (ref 94–109)
CO2 SERPL-SCNC: 22 MMOL/L (ref 20–32)
CREAT SERPL-MCNC: 1.19 MG/DL (ref 0.66–1.25)
DIFFERENTIAL METHOD BLD: ABNORMAL
EOSINOPHIL # BLD AUTO: 0.2 10E9/L (ref 0–0.7)
EOSINOPHIL NFR BLD AUTO: 2.3 %
ERYTHROCYTE [DISTWIDTH] IN BLOOD BY AUTOMATED COUNT: 20.1 % (ref 10–15)
GFR SERPL CREATININE-BSD FRML MDRD: 63 ML/MIN/1.7M2
GLUCOSE SERPL-MCNC: 109 MG/DL (ref 70–99)
HCO3 BLDV-SCNC: 21 MMOL/L (ref 21–28)
HCO3 BLDV-SCNC: 22 MMOL/L (ref 21–28)
HCO3 BLDV-SCNC: 23 MMOL/L (ref 21–28)
HCT VFR BLD AUTO: 52.3 % (ref 40–53)
HGB BLD-MCNC: 17.4 G/DL (ref 13.3–17.7)
IMM GRANULOCYTES # BLD: 0 10E9/L (ref 0–0.4)
IMM GRANULOCYTES NFR BLD: 0.5 %
INR PPP: 2.13 (ref 0.86–1.14)
INR PPP: 2.13 (ref 0.9–1.1)
LYMPHOCYTES # BLD AUTO: 1.1 10E9/L (ref 0.8–5.3)
LYMPHOCYTES NFR BLD AUTO: 14.1 %
MAGNESIUM SERPL-MCNC: 2.5 MG/DL (ref 1.6–2.3)
MCH RBC QN AUTO: 28.9 PG (ref 26.5–33)
MCHC RBC AUTO-ENTMCNC: 33.3 G/DL (ref 31.5–36.5)
MCV RBC AUTO: 87 FL (ref 78–100)
MONOCYTES # BLD AUTO: 1.1 10E9/L (ref 0–1.3)
MONOCYTES NFR BLD AUTO: 14.1 %
NEUTROPHILS # BLD AUTO: 5.4 10E9/L (ref 1.6–8.3)
NEUTROPHILS NFR BLD AUTO: 68.9 %
NRBC # BLD AUTO: 0 10*3/UL
NRBC BLD AUTO-RTO: 0 /100
O2/TOTAL GAS SETTING VFR VENT: 21 %
OXYHGB MFR BLDV: 70 %
OXYHGB MFR BLDV: 74 %
OXYHGB MFR BLDV: 91 %
PCO2 BLDV: 31 MM HG (ref 40–50)
PCO2 BLDV: 35 MM HG (ref 40–50)
PCO2 BLDV: 35 MM HG (ref 40–50)
PH BLDV: 7.41 PH (ref 7.32–7.43)
PH BLDV: 7.42 PH (ref 7.32–7.43)
PH BLDV: 7.44 PH (ref 7.32–7.43)
PLATELET # BLD AUTO: 231 10E9/L (ref 150–450)
PO2 BLDV: 41 MM HG (ref 25–47)
PO2 BLDV: 42 MM HG (ref 25–47)
PO2 BLDV: 68 MM HG (ref 25–47)
POTASSIUM SERPL-SCNC: 4.8 MMOL/L (ref 3.4–5.3)
RBC # BLD AUTO: 6.02 10E12/L (ref 4.4–5.9)
SODIUM SERPL-SCNC: 128 MMOL/L (ref 133–144)
WBC # BLD AUTO: 7.9 10E9/L (ref 4–11)

## 2018-11-18 PROCEDURE — 80048 BASIC METABOLIC PNL TOTAL CA: CPT | Performed by: INTERNAL MEDICINE

## 2018-11-18 PROCEDURE — 82805 BLOOD GASES W/O2 SATURATION: CPT | Performed by: INTERNAL MEDICINE

## 2018-11-18 PROCEDURE — 85025 COMPLETE CBC W/AUTO DIFF WBC: CPT | Performed by: INTERNAL MEDICINE

## 2018-11-18 PROCEDURE — 97110 THERAPEUTIC EXERCISES: CPT | Mod: GO | Performed by: OCCUPATIONAL THERAPIST

## 2018-11-18 PROCEDURE — A9270 NON-COVERED ITEM OR SERVICE: HCPCS | Mod: GY | Performed by: STUDENT IN AN ORGANIZED HEALTH CARE EDUCATION/TRAINING PROGRAM

## 2018-11-18 PROCEDURE — 71045 X-RAY EXAM CHEST 1 VIEW: CPT

## 2018-11-18 PROCEDURE — 25000132 ZZH RX MED GY IP 250 OP 250 PS 637: Mod: GY | Performed by: INTERNAL MEDICINE

## 2018-11-18 PROCEDURE — 25000128 H RX IP 250 OP 636: Performed by: STUDENT IN AN ORGANIZED HEALTH CARE EDUCATION/TRAINING PROGRAM

## 2018-11-18 PROCEDURE — A9270 NON-COVERED ITEM OR SERVICE: HCPCS | Mod: GY | Performed by: INTERNAL MEDICINE

## 2018-11-18 PROCEDURE — 25000128 H RX IP 250 OP 636: Performed by: INTERNAL MEDICINE

## 2018-11-18 PROCEDURE — 25000132 ZZH RX MED GY IP 250 OP 250 PS 637: Mod: GY | Performed by: STUDENT IN AN ORGANIZED HEALTH CARE EDUCATION/TRAINING PROGRAM

## 2018-11-18 PROCEDURE — 83735 ASSAY OF MAGNESIUM: CPT | Performed by: INTERNAL MEDICINE

## 2018-11-18 PROCEDURE — 99291 CRITICAL CARE FIRST HOUR: CPT | Mod: GC | Performed by: INTERNAL MEDICINE

## 2018-11-18 PROCEDURE — 97166 OT EVAL MOD COMPLEX 45 MIN: CPT | Mod: GO | Performed by: OCCUPATIONAL THERAPIST

## 2018-11-18 PROCEDURE — 82805 BLOOD GASES W/O2 SATURATION: CPT | Performed by: THORACIC SURGERY (CARDIOTHORACIC VASCULAR SURGERY)

## 2018-11-18 PROCEDURE — 85610 PROTHROMBIN TIME: CPT | Performed by: INTERNAL MEDICINE

## 2018-11-18 PROCEDURE — 97535 SELF CARE MNGMENT TRAINING: CPT | Mod: GO | Performed by: OCCUPATIONAL THERAPIST

## 2018-11-18 PROCEDURE — 40000133 ZZH STATISTIC OT WARD VISIT: Performed by: OCCUPATIONAL THERAPIST

## 2018-11-18 PROCEDURE — 20000004 ZZH R&B ICU UMMC

## 2018-11-18 RX ORDER — WARFARIN SODIUM 2.5 MG/1
2.5 TABLET ORAL
Status: COMPLETED | OUTPATIENT
Start: 2018-11-18 | End: 2018-11-18

## 2018-11-18 RX ORDER — MILRINONE LACTATE 0.2 MG/ML
0.12 INJECTION, SOLUTION INTRAVENOUS CONTINUOUS
Status: DISCONTINUED | OUTPATIENT
Start: 2018-11-18 | End: 2018-11-23 | Stop reason: HOSPADM

## 2018-11-18 RX ORDER — TORSEMIDE 20 MG/1
40 TABLET ORAL
Status: DISCONTINUED | OUTPATIENT
Start: 2018-11-18 | End: 2018-11-19

## 2018-11-18 RX ADMIN — MESALAMINE 2400 MG: 800 TABLET, DELAYED RELEASE ORAL at 19:58

## 2018-11-18 RX ADMIN — SPIRONOLACTONE 50 MG: 25 TABLET ORAL at 07:51

## 2018-11-18 RX ADMIN — MULTIPLE VITAMINS W/ MINERALS TAB 1 TABLET: TAB at 07:52

## 2018-11-18 RX ADMIN — Medication 37.5 MG: at 17:39

## 2018-11-18 RX ADMIN — LEVOTHYROXINE SODIUM 88 MCG: 88 TABLET ORAL at 07:52

## 2018-11-18 RX ADMIN — ACETAMINOPHEN 650 MG: 325 TABLET, FILM COATED ORAL at 19:59

## 2018-11-18 RX ADMIN — OMEPRAZOLE 40 MG: 20 CAPSULE, DELAYED RELEASE ORAL at 17:38

## 2018-11-18 RX ADMIN — ISOSORBIDE DINITRATE 30 MG: 30 TABLET ORAL at 11:48

## 2018-11-18 RX ADMIN — PROCHLORPERAZINE EDISYLATE 5 MG: 5 INJECTION INTRAMUSCULAR; INTRAVENOUS at 11:47

## 2018-11-18 RX ADMIN — ONDANSETRON 4 MG: 2 INJECTION INTRAMUSCULAR; INTRAVENOUS at 07:44

## 2018-11-18 RX ADMIN — PROCHLORPERAZINE EDISYLATE 5 MG: 5 INJECTION INTRAMUSCULAR; INTRAVENOUS at 17:35

## 2018-11-18 RX ADMIN — MONTELUKAST SODIUM 10 MG: 10 TABLET, COATED ORAL at 19:59

## 2018-11-18 RX ADMIN — WARFARIN SODIUM 2.5 MG: 2.5 TABLET ORAL at 17:38

## 2018-11-18 RX ADMIN — Medication 1 MG: at 19:58

## 2018-11-18 RX ADMIN — DULOXETINE 20 MG: 20 CAPSULE, DELAYED RELEASE ORAL at 07:52

## 2018-11-18 RX ADMIN — AMIODARONE HYDROCHLORIDE 200 MG: 200 TABLET ORAL at 07:50

## 2018-11-18 RX ADMIN — MESALAMINE 2400 MG: 800 TABLET, DELAYED RELEASE ORAL at 07:53

## 2018-11-18 RX ADMIN — TORSEMIDE 40 MG: 20 TABLET ORAL at 16:31

## 2018-11-18 RX ADMIN — Medication 37.5 MG: at 07:49

## 2018-11-18 RX ADMIN — AZELASTINE HYDROCHLORIDE 2 SPRAY: 137 SPRAY, METERED NASAL at 20:00

## 2018-11-18 RX ADMIN — TAMSULOSIN HYDROCHLORIDE 0.4 MG: 0.4 CAPSULE ORAL at 16:31

## 2018-11-18 RX ADMIN — Medication 37.5 MG: at 11:48

## 2018-11-18 RX ADMIN — PROCHLORPERAZINE EDISYLATE 5 MG: 5 INJECTION INTRAMUSCULAR; INTRAVENOUS at 05:59

## 2018-11-18 RX ADMIN — OMEPRAZOLE 40 MG: 20 CAPSULE, DELAYED RELEASE ORAL at 07:51

## 2018-11-18 RX ADMIN — ONDANSETRON 4 MG: 2 INJECTION INTRAMUSCULAR; INTRAVENOUS at 15:02

## 2018-11-18 RX ADMIN — AZELASTINE HYDROCHLORIDE 2 SPRAY: 137 SPRAY, METERED NASAL at 07:54

## 2018-11-18 RX ADMIN — ISOSORBIDE DINITRATE 30 MG: 30 TABLET ORAL at 17:38

## 2018-11-18 RX ADMIN — POTASSIUM CHLORIDE 40 MEQ: 750 TABLET, EXTENDED RELEASE ORAL at 07:51

## 2018-11-18 RX ADMIN — ACETAMINOPHEN 650 MG: 325 TABLET, FILM COATED ORAL at 11:48

## 2018-11-18 RX ADMIN — ISOSORBIDE DINITRATE 30 MG: 30 TABLET ORAL at 07:50

## 2018-11-18 RX ADMIN — MILRINONE LACTATE 0.28 MCG/KG/MIN: 200 INJECTION, SOLUTION INTRAVENOUS at 12:16

## 2018-11-18 ASSESSMENT — ACTIVITIES OF DAILY LIVING (ADL)
ADLS_ACUITY_SCORE: 11
IADL_COMMENTS: OT: PT WAS IND

## 2018-11-18 ASSESSMENT — PAIN DESCRIPTION - DESCRIPTORS: DESCRIPTORS: ACHING

## 2018-11-18 NOTE — PLAN OF CARE
Problem: Patient Care Overview  Goal: Plan of Care/Patient Progress Review  Outcome: Improving                       Intermittent nausea-relieved by meds. Nipride off since 1930, SBP has been 90's MAP 70's. PA 38/24 CVP 5 CO 3.5 (2.1).             1100cc UOP net -660cc. 53.1 kg today AM. Continue to monitor and intervene as indicated.

## 2018-11-18 NOTE — PROGRESS NOTES
ADVANCED HEART FAILURE PROGRESS NOTE    SUBJECTIVE:  No acute events, feeling fairly well this morning.    ROS otherwise negative.    OBJECTIVE:  Vital signs:  Temp: 97.6  F (36.4  C) Temp  Min: 97.4  F (36.3  C)  Max: 97.7  F (36.5  C)  Heart Rate: 69 Heart Rate  Min: 65  Max: 81  BP: 95/71 Systolic (24hrs), Av , Min:67 , Max:114   Diastolic (24hrs), Av, Min:41, Max:87    Resp: 16 Resp  Min: 15  Max: 17  SpO2: 97 % SpO2  Min: 93 %  Max: 98 %      Hemodynamics  RA 5, PA 38/20    Intake/Output Summary (Last 24 hours) at 18  Last data filed at 18   Gross per 24 hour   Intake          1425.41 ml   Output             2025 ml   Net          -599.59 ml       Vitals:    18 0600 18 0600 18 06   Weight: 58.4 kg (128 lb 12 oz) 57.1 kg (125 lb 14.1 oz) 53.1 kg (117 lb 1 oz)         Physical Exam:  Gen: no distress  HEENT: PERRL, moist mucosa  Resp: clear bilaterally, no r/r/w  CVS: regular, no murmurs, normal S2 S2, no JVD  Abdo: soft, nontender  Extremities: no edema, warm, normal pulses       Medications:    DOBUTamine 2.5 mcg/kg/min (18 07)     furosemide (LASIX) infusion ADULT STANDARD 5 mg/hr (18)     nitroPRUsside (NIPRIDE) IV infusion ADULT/PEDS GREATER than or EQUAL to 45 kg std conc Stopped (18)     - MEDICATION INSTRUCTIONS -       - MEDICATION INSTRUCTIONS -       Reason ACE/ARB/ARNI order not selected       Reason beta blocker order not selected       Warfarin Therapy Reminder         Current Facility-Administered Medications   Medication Dose Route Frequency     amiodarone  200 mg Oral Daily     azelastine  2 spray Both Nostrils BID     DULoxetine  20 mg Oral Daily     heparin lock flush  5-10 mL Intracatheter Q24H     hydrALAZINE  37.5 mg Oral TID w/meals     isosorbide dinitrate  30 mg Oral TID w/meals     levothyroxine  88 mcg Oral Daily     mesalamine  2,400 mg Oral BID     montelukast  10 mg Oral QPM     multivitamin, therapeutic  with minerals  1 tablet Oral Daily     omeprazole  40 mg Oral Daily     potassium chloride SA  40 mEq Oral BID     sodium chloride (PF)  20 mL Intracatheter Once     sodium chloride (PF)  3 mL Intracatheter Q8H     spironolactone  50 mg Oral Daily     tamsulosin  0.4 mg Oral Daily with supper         Labs:  CMP  Recent Labs  Lab 11/18/18  0331 11/17/18  1209 11/17/18  0405  11/15/18  0955   *  --  130*  < > 127*   POTASSIUM 4.8 4.6 5.0  < > 4.7   CHLORIDE 94  --  97  < > 94   CO2 22  --  23  < > 24   BUN 44*  --  43*  < > 43*   CR 1.19  --  1.20  < > 1.70*   RADAMES 9.0  --  9.0  < > 9.1   MAG 2.5*  --  2.5*  < >  --    PHOS  --   --   --   --  3.4   *  --  98  < > 128*   LDH  --   --   --   --  165   < > = values in this interval not displayed.  BLOOD GASNo lab results found in last 7 days.  CBC  Recent Labs  Lab 11/18/18  0331 11/17/18  0405   WBC 7.9 8.7   HGB 17.4 17.0   HCT 52.3 51.8    264     COAG  Recent Labs  Lab 11/18/18  0331 11/17/18  0405  11/15/18  0955   INR 2.13* 1.93*  < > 2.00*   PTT  --   --   --  42*   < > = values in this interval not displayed.        ASSESSMENT/PLAN:  Everton azevedo is a 54yo M pAF s/p ablation, NICM (EF 15%)s/p AICD, ASD s/p repair with persistent left SVC who is presenting with decompensated heart failure and cardiogenic shock requiring inotropes.      # Adance heart failure s/p CRT-D  # NICM  # p Afib  - Switch dobutamine 2.5 to milrinone 0.125 in preparation for discharge with home inotropes  - Continue hydralazine 37.5 TID and isordil 30 TID  - continue holding BB   - appears euvolemic, stop lasix drip, start torsemide 40 BID  - continue spironolactone 50  - on warfarin and amiodarone for afib  - Continue  advance therapy work up, will likely discharge home with inotropes  - PICC placement by IR on Monday      # UC  - continue home medications        Patient seen and discussed with Dr. Gagandeep Harris MD  Advanced Heart Failure  Fellow  [phone]      Late entry - pt seen and examined on 11/18/18  Critical Care ICU Note - Cardiology  Corinna Donis M.D.     The patient remains in the ICU with on-going need for parenteral medications for the adjustment of blood pressure and cardiac output and maintenance of renal function.       The patient is seen for cardiogenic shock necessitating inotropic agents, vasopressors and afterload reducing agents; fluid and diuretic management to maintain blood pressure and secondary organ function     I personally reviewed:  Hemodynamic parameters obtained by central hemodynamic monitoring including RAP, estimated LVEDP, pulmonary artery pressure, cardiac output and vascular resistances in order to adjust fluids and infused medications for blood pressure and cardiac output maintenance.     Pt's hemodynamics improved today however remains inotrope dependent.  Will transition to oral diuretics. Patient still working to determine care giver option required for LVAD.  Will plan to discharge with home inotropes to allow time for patient to complete evaluation and confirm care plan.  Given this will transition from dobutamine to milrinone.      Corinna Donis MD  Section Head - Advanced Heart Failure, Transplantation and Mechanical Circulatory Support  Co-Director - [facility]  Associate Professor of Medicine, [facility]     I spent 40 minutes in critical care of the patient including 20 minutes of direct patient care including serial assessments and discussion with the patient and patient's care team.

## 2018-11-18 NOTE — PLAN OF CARE
Problem: Patient Care Overview  Goal: Plan of Care/Patient Progress Review  Discharge Planner OT   Patient plan for discharge: Home  Current status: OT eval completed,Pt ambulated 300ft w/ 4 standing rest breaks. Pt's VSS on RA, RN present 2/2 swan line. Pt pushing IV pole throughout and demonstrating slow but steady gait throughout. Pt asymptomatic throughout. Pt's VSS throughout w/ HR in 70s-80s throughout.   Barriers to return to prior living situation: medical status  Recommendations for discharge: Home w/ OP Therapy  Rationale for recommendations: pending pt progress       Entered by: Kat Lozano 11/18/2018 12:45 PM

## 2018-11-18 NOTE — PROGRESS NOTES
"   11/18/18 1200   Quick Adds   Type of Visit Initial Occupational Therapy Evaluation   Living Environment   Lives With alone   Living Arrangements house  ([facility])   Home Accessibility no concerns   Number of Stairs to Enter Home 0   Number of Stairs Within Home 0   Transportation Available car;family or friend will provide   Living Environment Comment OT: Pt reports living alone in [facility]   Self-Care   Dominant Hand right   Usual Activity Tolerance moderate   Current Activity Tolerance moderate   Regular Exercise no   Equipment Currently Used at Home none   Functional Level Prior   Ambulation 0-->independent   Transferring 0-->independent   Toileting 0-->independent   Bathing 0-->independent   Dressing 0-->independent   Eating 0-->independent   Communication 0-->understands/communicates without difficulty   Swallowing 0-->swallows foods/liquids without difficulty   Cognition 0 - no cognition issues reported   Fall history within last six months no   General Information   Onset of Illness/Injury or Date of Surgery - Date 11/15/18   Referring Physician Nisha Mckeon MD   Patient/Family Goals Statement to discharge home   Additional Occupational Profile Info/Pertinent History of Current Problem per chart \"Everton Larios is a 53 year old male admitted with heart failure exacerbation.  He has a remote history of ASD repair as a child.  He has had atrial fibrillation and ablation.  He has recent history of moderate ETOH use.  He has been abstinent from ETOH for 1 month after recommendation from Dr. Beck\"   Precautions/Limitations fall precautions   Cognitive Status Examination   Cognitive Comment No deficits were identified   Visual Perception   Visual Perception No deficits were identified   Sensory Examination   Sensory Quick Adds No deficits were identified   Range of Motion (ROM)   ROM Comment OT: BUE WFL   Strength   Strength Comments OT: Overall deconditioned   Muscle Tone Assessment   Muscle " "Tone Comments OT: Overall deconditioned   Mobility   Bed Mobility Comments OT: SBA   Transfer Skills   Transfer Comments OT: SBA   Balance   Balance Comments OT: CGA-SBA   Instrumental Activities of Daily Living (IADL)   IADL Comments OT: Pt was ind    Activities of Daily Living Analysis   Impairments Contributing to Impaired Activities of Daily Living balance impaired;strength decreased   General Therapy Interventions   Planned Therapy Interventions ADL retraining;balance training;IADL retraining;strengthening;transfer training;home program guidelines;progressive activity/exercise   Clinical Impression   Criteria for Skilled Therapeutic Interventions Met yes, treatment indicated   OT Diagnosis decreased ind in ADLS/IADLS   Influenced by the following impairments weakness and fatigue   Assessment of Occupational Performance 3-5 Performance Deficits   Identified Performance Deficits decreased ind in ADLS/IADLS   Clinical Decision Making (Complexity) Moderate complexity   Therapy Frequency 3 times/wk   Predicted Duration of Therapy Intervention (days/wks) 12/25/18   Anticipated Discharge Disposition Home with Outpatient Therapy   Risks and Benefits of Treatment have been explained. Yes   Patient, Family & other staff in agreement with plan of care Yes   [facility] [employer] \"6 Clicks\"   2016, Trustees of [facility], under license to [employer].  All rights reserved.   6 Clicks Short Forms Daily Activity Inpatient Short Form   [facility] [employer]  \"6 Clicks\" Daily Activity Inpatient Short Form   1. Putting on and taking off regular lower body clothing? 4 - None   2. Bathing (including washing, rinsing, drying)? 4 - None   3. Toileting, which includes using toilet, bedpan or urinal? 4 - None   4. Putting on and taking off regular upper body clothing? 4 - None   5. Taking care of personal grooming such as brushing teeth? 4 - None   6. Eating meals? 4 - None   Daily Activity Raw Score (Score out of " 24.Lower scores equate to lower levels of function) 24   Total Evaluation Time   Total Evaluation Time (Minutes) 6

## 2018-11-18 NOTE — PLAN OF CARE
Problem: Cardiac: Heart Failure (Adult)  Goal: Signs and Symptoms of Listed Potential Problems Will be Absent, Minimized or Managed (Cardiac: Heart Failure)  Signs and symptoms of listed potential problems will be absent, minimized or managed by discharge/transition of care (reference Cardiac: Heart Failure (Adult) CPG).   Outcome: Therapy, progress toward functional goals is gradual  RE: Shift Summary  D/I: Lasix and dobutx gtts d/c'd, now on milirnone 0.25mcg/kg/min. Jaden CI=2.5 at 1230pm. SGC pulled, cordis left in place. N/v controlled somewhat with zofran and compazine iv- doses given before meds and meals. Appetite cont to be poor, but tolerated unsweetened cream of wheat well.  P: PICC placement in IR tomorrow. NPO at MN

## 2018-11-18 NOTE — PLAN OF CARE
"Problem: Cardiac: Heart Failure (Adult)  Goal: Signs and Symptoms of Listed Potential Problems Will be Absent, Minimized or Managed (Cardiac: Heart Failure)  Signs and symptoms of listed potential problems will be absent, minimized or managed by discharge/transition of care (reference Cardiac: Heart Failure (Adult) CPG).   Outcome: Therapy, progress towards functional goals is fair  Re: Shift Summary  D/I: N/v continues to be a problem- better controlled with zofran IV. Jaden CI at 1200=2.0. Unable to wean nipride off tk SBP<100 after oral antihypertensives. Poor appetite- says sweet stuff makes him \"gag\". C/o neck pain secondary to pmhx of degenerative joint dz in his neck and SGC placement- gave tylenol and ice packs with relief.   P: Stagger IV zofran and compazine doses for better nausea control.  Give antiemetics before meds and meals. Confer w MD team re increasing antihypertensive dose? Confer w nutrition and MD team re changing diet or meds to stimulate appetite.       "

## 2018-11-19 ENCOUNTER — DOCUMENTATION ONLY (OUTPATIENT)
Dept: CARDIOLOGY | Facility: CLINIC | Age: 53
End: 2018-11-19

## 2018-11-19 ENCOUNTER — APPOINTMENT (OUTPATIENT)
Dept: ULTRASOUND IMAGING | Facility: CLINIC | Age: 53
DRG: 286 | End: 2018-11-19
Attending: INTERNAL MEDICINE
Payer: MEDICARE

## 2018-11-19 ENCOUNTER — APPOINTMENT (OUTPATIENT)
Dept: INTERVENTIONAL RADIOLOGY/VASCULAR | Facility: CLINIC | Age: 53
DRG: 286 | End: 2018-11-19
Attending: RADIOLOGY PRACTITIONER ASSISTANT
Payer: MEDICARE

## 2018-11-19 LAB
ANION GAP SERPL CALCULATED.3IONS-SCNC: 11 MMOL/L (ref 3–14)
ANION GAP SERPL CALCULATED.3IONS-SCNC: 9 MMOL/L (ref 3–14)
ANION GAP SERPL CALCULATED.3IONS-SCNC: NORMAL MMOL/L (ref 6–17)
BASE DEFICIT BLDV-SCNC: 1.5 MMOL/L
BASOPHILS # BLD AUTO: 0 10E9/L (ref 0–0.2)
BASOPHILS NFR BLD AUTO: 0.2 %
BUN SERPL-MCNC: 50 MG/DL (ref 7–30)
BUN SERPL-MCNC: 61 MG/DL (ref 7–30)
BUN SERPL-MCNC: NORMAL MG/DL (ref 7–30)
CALCIUM SERPL-MCNC: 9.4 MG/DL (ref 8.5–10.1)
CALCIUM SERPL-MCNC: 9.7 MG/DL (ref 8.5–10.1)
CALCIUM SERPL-MCNC: NORMAL MG/DL (ref 8.5–10.1)
CHLORIDE SERPL-SCNC: 92 MMOL/L (ref 94–109)
CHLORIDE SERPL-SCNC: 94 MMOL/L (ref 94–109)
CHLORIDE SERPL-SCNC: NORMAL MMOL/L (ref 94–109)
CO2 SERPL-SCNC: 18 MMOL/L (ref 20–32)
CO2 SERPL-SCNC: 20 MMOL/L (ref 20–32)
CO2 SERPL-SCNC: NORMAL MMOL/L (ref 20–32)
COPATH REPORT: NORMAL
CREAT SERPL-MCNC: 1.36 MG/DL (ref 0.66–1.25)
CREAT SERPL-MCNC: 1.58 MG/DL (ref 0.66–1.25)
CREAT SERPL-MCNC: NORMAL MG/DL (ref 0.66–1.25)
DIFFERENTIAL METHOD BLD: ABNORMAL
EOSINOPHIL # BLD AUTO: 0.2 10E9/L (ref 0–0.7)
EOSINOPHIL NFR BLD AUTO: 2.5 %
ERYTHROCYTE [DISTWIDTH] IN BLOOD BY AUTOMATED COUNT: 20.6 % (ref 10–15)
GFR SERPL CREATININE-BSD FRML MDRD: 46 ML/MIN/1.7M2
GFR SERPL CREATININE-BSD FRML MDRD: 54 ML/MIN/1.7M2
GFR SERPL CREATININE-BSD FRML MDRD: NORMAL ML/MIN/1.7M2
GLUCOSE SERPL-MCNC: 106 MG/DL (ref 70–99)
GLUCOSE SERPL-MCNC: 110 MG/DL (ref 70–99)
GLUCOSE SERPL-MCNC: NORMAL MG/DL (ref 70–99)
HCO3 BLDV-SCNC: 22 MMOL/L (ref 21–28)
HCT VFR BLD AUTO: 54.2 % (ref 40–53)
HGB BLD-MCNC: 18.3 G/DL (ref 13.3–17.7)
IMM GRANULOCYTES # BLD: 0.1 10E9/L (ref 0–0.4)
IMM GRANULOCYTES NFR BLD: 0.6 %
INR PPP: 2.36 (ref 0.86–1.14)
INR PPP: 2.36 (ref 0.9–1.1)
LYMPHOCYTES # BLD AUTO: 1.3 10E9/L (ref 0.8–5.3)
LYMPHOCYTES NFR BLD AUTO: 15.2 %
MAGNESIUM SERPL-MCNC: 2.7 MG/DL (ref 1.6–2.3)
MAGNESIUM SERPL-MCNC: 2.8 MG/DL (ref 1.6–2.3)
MAGNESIUM SERPL-MCNC: NORMAL MG/DL (ref 1.6–2.3)
MCH RBC QN AUTO: 29.5 PG (ref 26.5–33)
MCHC RBC AUTO-ENTMCNC: 33.8 G/DL (ref 31.5–36.5)
MCV RBC AUTO: 87 FL (ref 78–100)
MONOCYTES # BLD AUTO: 1.1 10E9/L (ref 0–1.3)
MONOCYTES NFR BLD AUTO: 12.3 %
NEUTROPHILS # BLD AUTO: 5.9 10E9/L (ref 1.6–8.3)
NEUTROPHILS NFR BLD AUTO: 69.2 %
NRBC # BLD AUTO: 0 10*3/UL
NRBC BLD AUTO-RTO: 0 /100
O2/TOTAL GAS SETTING VFR VENT: 21 %
OXYHGB MFR BLDV: 71 %
PCO2 BLDV: 35 MM HG (ref 40–50)
PH BLDV: 7.41 PH (ref 7.32–7.43)
PHOSPHATE SERPL-MCNC: 3.8 MG/DL (ref 2.5–4.5)
PHOSPHATE SERPL-MCNC: NORMAL MG/DL (ref 2.5–4.5)
PLATELET # BLD AUTO: 225 10E9/L (ref 150–450)
PO2 BLDV: 42 MM HG (ref 25–47)
POTASSIUM SERPL-SCNC: 4.5 MMOL/L (ref 3.4–5.3)
POTASSIUM SERPL-SCNC: 4.9 MMOL/L (ref 3.4–5.3)
POTASSIUM SERPL-SCNC: NORMAL MMOL/L (ref 3.4–5.3)
RBC # BLD AUTO: 6.21 10E12/L (ref 4.4–5.9)
SODIUM SERPL-SCNC: 121 MMOL/L (ref 133–144)
SODIUM SERPL-SCNC: 123 MMOL/L (ref 133–144)
SODIUM SERPL-SCNC: NORMAL MMOL/L (ref 133–144)
WBC # BLD AUTO: 8.5 10E9/L (ref 4–11)

## 2018-11-19 PROCEDURE — 82805 BLOOD GASES W/O2 SATURATION: CPT | Performed by: INTERNAL MEDICINE

## 2018-11-19 PROCEDURE — 25000132 ZZH RX MED GY IP 250 OP 250 PS 637: Mod: GY | Performed by: STUDENT IN AN ORGANIZED HEALTH CARE EDUCATION/TRAINING PROGRAM

## 2018-11-19 PROCEDURE — 25000128 H RX IP 250 OP 636: Performed by: RADIOLOGY

## 2018-11-19 PROCEDURE — 83735 ASSAY OF MAGNESIUM: CPT | Performed by: INTERNAL MEDICINE

## 2018-11-19 PROCEDURE — 85610 PROTHROMBIN TIME: CPT | Performed by: INTERNAL MEDICINE

## 2018-11-19 PROCEDURE — 27210903 ZZH KIT CR5

## 2018-11-19 PROCEDURE — 40000275 ZZH STATISTIC RCP TIME EA 10 MIN

## 2018-11-19 PROCEDURE — A9270 NON-COVERED ITEM OR SERVICE: HCPCS | Mod: GY | Performed by: STUDENT IN AN ORGANIZED HEALTH CARE EDUCATION/TRAINING PROGRAM

## 2018-11-19 PROCEDURE — 25000132 ZZH RX MED GY IP 250 OP 250 PS 637: Mod: GY | Performed by: INTERNAL MEDICINE

## 2018-11-19 PROCEDURE — 80048 BASIC METABOLIC PNL TOTAL CA: CPT | Performed by: INTERNAL MEDICINE

## 2018-11-19 PROCEDURE — 25000128 H RX IP 250 OP 636: Performed by: STUDENT IN AN ORGANIZED HEALTH CARE EDUCATION/TRAINING PROGRAM

## 2018-11-19 PROCEDURE — 27210995 ZZH RX 272: Performed by: RADIOLOGY

## 2018-11-19 PROCEDURE — 36415 COLL VENOUS BLD VENIPUNCTURE: CPT | Performed by: INTERNAL MEDICINE

## 2018-11-19 PROCEDURE — 84100 ASSAY OF PHOSPHORUS: CPT | Performed by: INTERNAL MEDICINE

## 2018-11-19 PROCEDURE — 40000196 ZZH STATISTIC RAPCV CVP MONITORING

## 2018-11-19 PROCEDURE — A9270 NON-COVERED ITEM OR SERVICE: HCPCS | Performed by: INTERNAL MEDICINE

## 2018-11-19 PROCEDURE — 25000132 ZZH RX MED GY IP 250 OP 250 PS 637: Performed by: INTERNAL MEDICINE

## 2018-11-19 PROCEDURE — 20000004 ZZH R&B ICU UMMC

## 2018-11-19 PROCEDURE — 85025 COMPLETE CBC W/AUTO DIFF WBC: CPT | Performed by: INTERNAL MEDICINE

## 2018-11-19 PROCEDURE — 27210732 ZZH ACCESSORY CR1

## 2018-11-19 PROCEDURE — 93922 UPR/L XTREMITY ART 2 LEVELS: CPT

## 2018-11-19 PROCEDURE — 99233 SBSQ HOSP IP/OBS HIGH 50: CPT | Mod: GC | Performed by: INTERNAL MEDICINE

## 2018-11-19 PROCEDURE — A9270 NON-COVERED ITEM OR SERVICE: HCPCS | Mod: GY | Performed by: INTERNAL MEDICINE

## 2018-11-19 PROCEDURE — 76937 US GUIDE VASCULAR ACCESS: CPT

## 2018-11-19 PROCEDURE — C1751 CATH, INF, PER/CENT/MIDLINE: HCPCS

## 2018-11-19 PROCEDURE — 25000128 H RX IP 250 OP 636: Performed by: INTERNAL MEDICINE

## 2018-11-19 PROCEDURE — 25000131 ZZH RX MED GY IP 250 OP 636 PS 637: Performed by: INTERNAL MEDICINE

## 2018-11-19 PROCEDURE — 94060 EVALUATION OF WHEEZING: CPT

## 2018-11-19 PROCEDURE — 27210908 ZZH NEEDLE CR4

## 2018-11-19 PROCEDURE — 36569 INSJ PICC 5 YR+ W/O IMAGING: CPT

## 2018-11-19 PROCEDURE — 02H633Z INSERTION OF INFUSION DEVICE INTO RIGHT ATRIUM, PERCUTANEOUS APPROACH: ICD-10-PCS | Performed by: RADIOLOGY

## 2018-11-19 RX ORDER — WARFARIN SODIUM 5 MG/1
5 TABLET ORAL
Status: COMPLETED | OUTPATIENT
Start: 2018-11-19 | End: 2018-11-19

## 2018-11-19 RX ORDER — HEPARIN SODIUM,PORCINE 10 UNIT/ML
5-10 VIAL (ML) INTRAVENOUS
Status: CANCELLED | OUTPATIENT
Start: 2018-11-19

## 2018-11-19 RX ORDER — HEPARIN SODIUM,PORCINE 10 UNIT/ML
5 VIAL (ML) INTRAVENOUS EVERY 24 HOURS
Status: CANCELLED | OUTPATIENT
Start: 2018-11-19

## 2018-11-19 RX ORDER — LIDOCAINE HYDROCHLORIDE 10 MG/ML
1-30 INJECTION, SOLUTION EPIDURAL; INFILTRATION; INTRACAUDAL; PERINEURAL
Status: COMPLETED | OUTPATIENT
Start: 2018-11-19 | End: 2018-11-19

## 2018-11-19 RX ORDER — NICOTINE POLACRILEX 4 MG
15-30 LOZENGE BUCCAL
Status: CANCELLED | OUTPATIENT
Start: 2018-11-19

## 2018-11-19 RX ORDER — DEXTROSE MONOHYDRATE 25 G/50ML
25-50 INJECTION, SOLUTION INTRAVENOUS
Status: CANCELLED | OUTPATIENT
Start: 2018-11-19

## 2018-11-19 RX ORDER — HEPARIN SODIUM,PORCINE 10 UNIT/ML
5 VIAL (ML) INTRAVENOUS
Status: COMPLETED | OUTPATIENT
Start: 2018-11-19 | End: 2018-11-19

## 2018-11-19 RX ADMIN — Medication 37.5 MG: at 08:24

## 2018-11-19 RX ADMIN — SODIUM CHLORIDE 250 ML: 9 INJECTION, SOLUTION INTRAVENOUS at 20:30

## 2018-11-19 RX ADMIN — MESALAMINE 2400 MG: 800 TABLET, DELAYED RELEASE ORAL at 08:25

## 2018-11-19 RX ADMIN — SPIRONOLACTONE 50 MG: 25 TABLET ORAL at 08:26

## 2018-11-19 RX ADMIN — Medication 2 ML: at 12:34

## 2018-11-19 RX ADMIN — MESALAMINE 2400 MG: 800 TABLET, DELAYED RELEASE ORAL at 20:30

## 2018-11-19 RX ADMIN — TAMSULOSIN HYDROCHLORIDE 0.4 MG: 0.4 CAPSULE ORAL at 17:54

## 2018-11-19 RX ADMIN — PROCHLORPERAZINE EDISYLATE 5 MG: 5 INJECTION INTRAMUSCULAR; INTRAVENOUS at 06:25

## 2018-11-19 RX ADMIN — Medication 1 MG: at 20:30

## 2018-11-19 RX ADMIN — ACETAMINOPHEN 650 MG: 325 TABLET, FILM COATED ORAL at 20:30

## 2018-11-19 RX ADMIN — LIDOCAINE HYDROCHLORIDE 2 ML: 10 INJECTION, SOLUTION EPIDURAL; INFILTRATION; INTRACAUDAL; PERINEURAL at 12:33

## 2018-11-19 RX ADMIN — LEVOTHYROXINE SODIUM 88 MCG: 88 TABLET ORAL at 08:25

## 2018-11-19 RX ADMIN — AMIODARONE HYDROCHLORIDE 200 MG: 200 TABLET ORAL at 08:23

## 2018-11-19 RX ADMIN — ONDANSETRON 4 MG: 2 INJECTION INTRAMUSCULAR; INTRAVENOUS at 07:43

## 2018-11-19 RX ADMIN — POTASSIUM CHLORIDE 40 MEQ: 750 TABLET, EXTENDED RELEASE ORAL at 08:25

## 2018-11-19 RX ADMIN — ISOSORBIDE DINITRATE 30 MG: 30 TABLET ORAL at 08:24

## 2018-11-19 RX ADMIN — OMEPRAZOLE 40 MG: 20 CAPSULE, DELAYED RELEASE ORAL at 08:25

## 2018-11-19 RX ADMIN — ONDANSETRON 4 MG: 4 TABLET, ORALLY DISINTEGRATING ORAL at 15:30

## 2018-11-19 RX ADMIN — MONTELUKAST SODIUM 10 MG: 10 TABLET, COATED ORAL at 20:30

## 2018-11-19 RX ADMIN — MILRINONE LACTATE 0.12 MCG/KG/MIN: 200 INJECTION, SOLUTION INTRAVENOUS at 14:43

## 2018-11-19 RX ADMIN — ACETAMINOPHEN 650 MG: 325 TABLET, FILM COATED ORAL at 08:20

## 2018-11-19 RX ADMIN — TORSEMIDE 40 MG: 20 TABLET ORAL at 08:28

## 2018-11-19 RX ADMIN — OMEPRAZOLE 40 MG: 20 CAPSULE, DELAYED RELEASE ORAL at 17:54

## 2018-11-19 RX ADMIN — DULOXETINE 20 MG: 20 CAPSULE, DELAYED RELEASE ORAL at 08:23

## 2018-11-19 RX ADMIN — AZELASTINE HYDROCHLORIDE 2 SPRAY: 137 SPRAY, METERED NASAL at 20:31

## 2018-11-19 RX ADMIN — AZELASTINE HYDROCHLORIDE 2 SPRAY: 137 SPRAY, METERED NASAL at 08:23

## 2018-11-19 RX ADMIN — PROCHLORPERAZINE EDISYLATE 5 MG: 5 INJECTION INTRAMUSCULAR; INTRAVENOUS at 13:25

## 2018-11-19 RX ADMIN — WARFARIN SODIUM 5 MG: 5 TABLET ORAL at 17:54

## 2018-11-19 ASSESSMENT — ACTIVITIES OF DAILY LIVING (ADL)
ADLS_ACUITY_SCORE: 11

## 2018-11-19 NOTE — PROGRESS NOTES
Bedside PFT performed x 3 attempts.  Results are:        1  2  3  FEV1        2.02 L          2.06          1.52   FVC        2.83 L          3.04          2.74  FEV/FVC    71%           68%          55%  PEF        3.98 l/s          5.02          2.42

## 2018-11-19 NOTE — PLAN OF CARE
Problem: Patient Care Overview  Goal: Plan of Care/Patient Progress Review  Goals:  1. Hemodynamically stable  2. Remains euvolemic  Outcome: Improving                 VSS, intermittent nauseous. MAP 60-80. NPO for procedure today.          Net neg 500cc. Able to make needs known.          Continue to monitor and intervene as indicated.

## 2018-11-19 NOTE — PROGRESS NOTES
Patient Name: Everton Larios  Medical Record Number: [id number]  Today's Date: 11/19/2018    Procedure: PICC placement (single lumen)  Proceduralist: , TO with Dr. Sy    Sedation start time: no sedation    Procedure start time: 1150  Puncture time: 1205  Procedure end time: 1240    Report given to: Camryn CARTER RN  : none    Other Notes: Pt arrived to IR room 5 from . Consent reviewed. Pt denies any questions or concerns regarding procedure. Pt positioned supine and monitored per protocol.  Report given to Karen Rodríguez RN @ 1220.     Li Torres RN    D: Handoff report received from KINA Gonsales RN. Assumed care of Patient at 1222.  A: Patient tolerated the procedure without apparent incident. The dressing over the right basilic PICC is clean, dry and intact. The PICC position was verified using fluoroscopy and is ready for immediate use per Dr Draper.  P: Patient returned to  for continued cares.    Bettina Rodríguez RN  Pager [phone]

## 2018-11-19 NOTE — PROCEDURES
Interventional Radiology Brief Post Procedure Note    Procedure: IR PICC PLACEMENT > 5 YRS OF AGE    Proceduralist: Santino Sy MD    Assistant: Sandro Draper MD and None    Time Out: Prior to the start of the procedure and with procedural staff participation, I verbally confirmed the patient s identity using two indicators, relevant allergies, that the procedure was appropriate and matched the consent or emergent situation, and that the correct equipment/implants were available. Immediately prior to starting the procedure I conducted the Time Out with the procedural staff and re-confirmed the patient s name, procedure, and site/side. (The Joint Commission universal protocol was followed.)  Yes    Medications   Medication Event Details Admin User Admin Time   lidocaine (PF) (XYLOCAINE) 1 % injection 1-30 mL Medication Given by Other Clinician Dose: 2 mL; Route: Subcutaneous Bettina Rodríguez RN 11/19/2018 12:33 PM   heparin lock flush 10 UNIT/ML injection 5 mL Medication Given by Other Clinician Dose: 2 mL; Route: Intravenous; Comment: heprarin flush PICC Bettina Rodríguez RN 11/19/2018 12:34 PM       Sedation: None. Local Anesthestic used    Findings: A single lumen PICC line is placed. Ready to use.    Estimated Blood Loss: Minimal    Fluoroscopy Time: 3.9 minute(s)    SPECIMENS: None    Complications: 1. None     Condition: Stable    Plan: Catheter is ready to use    Comments: See dictated procedure note for full details.    Sandro Draper MD

## 2018-11-19 NOTE — CONSULTS
Patient is on IR schedule 11/19/2018 for a Single lumen picc line placement .   Labs WNL for procedure.   No NPO required  Consent will be done prior to procedure.    Please contact the IR [place] RN at [phone] for estimated time of procedure.   Vidhi Frey IR RPA  [phone] Call pager  [phone] pager

## 2018-11-19 NOTE — PROGRESS NOTES
Calorie Count  Intake recorded for: 11/18 Kcals: 0  Protein: 0g  # Meals Recorded: 2 meals ordered from kitchen, no intake recorded.   # Supplements Recorded: no intake recorded.

## 2018-11-19 NOTE — PLAN OF CARE
Problem: Patient Care Overview  Goal: Plan of Care/Patient Progress Review  Goals:  1. Hemodynamically stable  2. Remains euvolemic   PT 4E: Hold; per OT pt may only need one rehab discipline. Will reschedule as appropriate.

## 2018-11-19 NOTE — PROGRESS NOTES
Cardiology Progress Note    Overnight/subj:   No events overnight   Switch to milrinone yesterday and lasix gtt > torsemide     VS reviewed: Notable for temp: AF, HR 68-73, MAP 59-79, oxygenating on 93% on RA  Tele: 3 beats of NSVT  [place]: CVP 6,     Wt: on admit 133, yday 117, today 123  I/O: 1.0 in / 2.0 out yday. Since MN 18 in / 550 out    Drips:   Milrinone 0.125    PO Cardiac Meds: Amiodorone 200mg daily, hydralazine 37.5 TID, Isordil 30mg TID, torsemide 40mg BID KCL 40mg BID, Spironolactone 50 daily, warfarin 5mg daily      Other meds: Azelastine 2sprays BID, Duloxetine 20mg daily, Levothyroxine 88mcg daily, mesalamine 2.4g bid, omeprazole 40mg daily, flomax 0.4mg daily    Radiology Imaging  No new study    Cardiology Studies:  No new study    Labs: Reviewed in epic    Phys exam:  GEN: NAD  Pulm: ctab  Cardiac: JVP 5-6cm h20, RRR, no murmurs  Vascular: - ESTELLA and +2 pulses  GI: soft, non distended    ASSESSMENT/PLAN:  Everton Larios is a 54yo M pAF s/p ablation, NICM (EF 15%)s/p AICD, ASD s/p repair with persistent left SVC who is presenting with decompensated heart failure and cardiogenic shock requiring inotropes. Now inotrope dependant.     Mild increase in creatinine most likely secondary vasodilation from milrinone. Will hold medications for today.      # Adance heart failure s/p CRT-D  # NICM  # p Afib  - Continue Milrinone   - Hold Hydralazine/isordil  - Hold diuretics today given CVP  - Hold Spironolactone   - continue holding BB   - Continue  advance therapy work up, plan for home inotrope while work up is completed   - PICC placement by IR on today  - Education on PICC and milrinone managment   - Transfer to the floor once PICC line is place  - Needs to meet neuropsych    # UC  - continue home medications       Patient seen and discussed with Dr. Gagandeep Templeton MD  Cardiology Fellow  [id number]         Late entry - pt seen and examined on 11/19/18  Critical Care ICU Note - Cardiology  Corinna M.  ESTEFANIA Donis.     The patient remains in the ICU with on-going need for parenteral medications for the adjustment of blood pressure and cardiac output and maintenance of renal function.       The patient is seen for cardiogenic shock necessitating inotropic agents, vasopressors and afterload reducing agents; fluid and diuretic management to maintain blood pressure and secondary organ function     I personally reviewed:  Hemodynamic parameters obtained by central hemodynamic monitoring including RAP, estimated LVEDP, pulmonary artery pressure, cardiac output and vascular resistances in order to adjust fluids and infused medications for blood pressure and cardiac output maintenance.     Pt with worsened UOP and Cr after transition from dobutamine to milrinone.  Likely 2/2 over vasodilation.  Have discontinued hydralazine and isordil.  Will also hold aldactone given hyponatremia.  PICC line placed in preparation from home inotropes.  Continue LVAD evaluation.     Corinna Donis MD  Section Head - Advanced Heart Failure, Transplantation and Mechanical Circulatory Support  Co-Director - Adult Congenital and Cardiovascular Genetics Center  Associate Professor of Medicine, [facility]     I spent 40 minutes in critical care of the patient including 20 minutes of direct patient care including serial assessments and discussion with the patient and patient's care team.

## 2018-11-19 NOTE — PROGRESS NOTES
Transplant Social Work Services Progress Note      Date of Initial Social Work Evaluation: not yet completed  Collaborated with: Everton, bedside RN, Cards 2    Data: DOMENIC started evaluation last week with Everton. Received consult order over the weekend re: HCD.   Intervention: Met with Everton briefly as he had been with other providers.   Assessment: Everton reports that he's spoken to his parents and they and his brother are able/willing to be his caregivers post lvad implant. He reports that he doesn't have any new questions about a HCD, he hadn't looked at it all weekend as he wasn't feeling well enough to do so.   Education provided by DOMENIC: HCD  Plan:    Discharge Plans in Progress: pending medical stability     Barriers to d/c plan: pending medical stability     Follow up Plan: DOMENIC will attempt to see Everton tomorrow to complete full LVAD psychosocial evaluation.    Pager [phone]

## 2018-11-19 NOTE — PLAN OF CARE
Problem: Cardiac: Heart Failure (Adult)  Goal: Signs and Symptoms of Listed Potential Problems Will be Absent, Minimized or Managed (Cardiac: Heart Failure)  Signs and symptoms of listed potential problems will be absent, minimized or managed by discharge/transition of care (reference Cardiac: Heart Failure (Adult) CPG).   Outcome: Therapy, progress toward functional goals is gradual  RE: PICC line placement in IR, CVP does not correlate to cordis, Change in diet order?  D/I: Pt arrived from IR on monitor at 1300 w RN x1. Single lumen PICC line placement in IR, no problems except for some nausea per IR RN in report. CVP on cordis=4-6, CVP off PICC=35. Does not correlate. Notified Cards II Fellow Dr Templeton. Pt hyponatremic, Hw=662, Low sodium diet ordered and 2L FR. Pt has low appetite, and intake is <25%.   A: PICC CVP not an accurate measure of CVP. Pt needs more sodium, not less and more appetizing foods at this time.  P: Confer w MDs re changing diet order.

## 2018-11-20 ENCOUNTER — HOME INFUSION (PRE-WILLOW HOME INFUSION) (OUTPATIENT)
Dept: PHARMACY | Facility: CLINIC | Age: 53
End: 2018-11-20

## 2018-11-20 ENCOUNTER — APPOINTMENT (OUTPATIENT)
Dept: OCCUPATIONAL THERAPY | Facility: CLINIC | Age: 53
DRG: 286 | End: 2018-11-20
Attending: INTERNAL MEDICINE
Payer: MEDICARE

## 2018-11-20 LAB
ANION GAP SERPL CALCULATED.3IONS-SCNC: 10 MMOL/L (ref 3–14)
ANION GAP SERPL CALCULATED.3IONS-SCNC: 13 MMOL/L (ref 3–14)
BASOPHILS # BLD AUTO: 0 10E9/L (ref 0–0.2)
BASOPHILS NFR BLD AUTO: 0.4 %
BUN SERPL-MCNC: 64 MG/DL (ref 7–30)
BUN SERPL-MCNC: 66 MG/DL (ref 7–30)
CALCIUM SERPL-MCNC: 9.2 MG/DL (ref 8.5–10.1)
CALCIUM SERPL-MCNC: 9.5 MG/DL (ref 8.5–10.1)
CHLORIDE SERPL-SCNC: 95 MMOL/L (ref 94–109)
CHLORIDE SERPL-SCNC: 95 MMOL/L (ref 94–109)
CO2 SERPL-SCNC: 16 MMOL/L (ref 20–32)
CO2 SERPL-SCNC: 17 MMOL/L (ref 20–32)
CREAT SERPL-MCNC: 1.27 MG/DL (ref 0.66–1.25)
CREAT SERPL-MCNC: 1.41 MG/DL (ref 0.66–1.25)
DIFFERENTIAL METHOD BLD: ABNORMAL
EOSINOPHIL # BLD AUTO: 0.2 10E9/L (ref 0–0.7)
EOSINOPHIL NFR BLD AUTO: 2.5 %
ERYTHROCYTE [DISTWIDTH] IN BLOOD BY AUTOMATED COUNT: 20.7 % (ref 10–15)
GFR SERPL CREATININE-BSD FRML MDRD: 52 ML/MIN/1.7M2
GFR SERPL CREATININE-BSD FRML MDRD: 59 ML/MIN/1.7M2
GLUCOSE SERPL-MCNC: 100 MG/DL (ref 70–99)
GLUCOSE SERPL-MCNC: 111 MG/DL (ref 70–99)
HCT VFR BLD AUTO: 55 % (ref 40–53)
HGB BLD-MCNC: 18.5 G/DL (ref 13.3–17.7)
IMM GRANULOCYTES # BLD: 0.1 10E9/L (ref 0–0.4)
IMM GRANULOCYTES NFR BLD: 0.6 %
INR PPP: 2.65 (ref 0.86–1.14)
INR PPP: 2.65 (ref 0.9–1.1)
LA PPP-IMP: NEGATIVE
LYMPHOCYTES # BLD AUTO: 1.5 10E9/L (ref 0.8–5.3)
LYMPHOCYTES NFR BLD AUTO: 17.9 %
MAGNESIUM SERPL-MCNC: 2.7 MG/DL (ref 1.6–2.3)
MCH RBC QN AUTO: 29.6 PG (ref 26.5–33)
MCHC RBC AUTO-ENTMCNC: 33.6 G/DL (ref 31.5–36.5)
MCV RBC AUTO: 88 FL (ref 78–100)
MONOCYTES # BLD AUTO: 1.1 10E9/L (ref 0–1.3)
MONOCYTES NFR BLD AUTO: 13.4 %
NEUTROPHILS # BLD AUTO: 5.4 10E9/L (ref 1.6–8.3)
NEUTROPHILS NFR BLD AUTO: 65.2 %
NRBC # BLD AUTO: 0 10*3/UL
NRBC BLD AUTO-RTO: 0 /100
PLATELET # BLD AUTO: 205 10E9/L (ref 150–450)
POTASSIUM SERPL-SCNC: 4.4 MMOL/L (ref 3.4–5.3)
POTASSIUM SERPL-SCNC: 5 MMOL/L (ref 3.4–5.3)
RBC # BLD AUTO: 6.24 10E12/L (ref 4.4–5.9)
SODIUM SERPL-SCNC: 123 MMOL/L (ref 133–144)
SODIUM SERPL-SCNC: 123 MMOL/L (ref 133–144)
T4 FREE SERPL-MCNC: 1.37 NG/DL (ref 0.76–1.46)
WBC # BLD AUTO: 8.3 10E9/L (ref 4–11)

## 2018-11-20 PROCEDURE — 80048 BASIC METABOLIC PNL TOTAL CA: CPT | Performed by: INTERNAL MEDICINE

## 2018-11-20 PROCEDURE — 97530 THERAPEUTIC ACTIVITIES: CPT | Mod: GO

## 2018-11-20 PROCEDURE — A9270 NON-COVERED ITEM OR SERVICE: HCPCS | Performed by: INTERNAL MEDICINE

## 2018-11-20 PROCEDURE — 25000132 ZZH RX MED GY IP 250 OP 250 PS 637: Performed by: STUDENT IN AN ORGANIZED HEALTH CARE EDUCATION/TRAINING PROGRAM

## 2018-11-20 PROCEDURE — 85610 PROTHROMBIN TIME: CPT | Performed by: INTERNAL MEDICINE

## 2018-11-20 PROCEDURE — 36415 COLL VENOUS BLD VENIPUNCTURE: CPT | Performed by: INTERNAL MEDICINE

## 2018-11-20 PROCEDURE — A9270 NON-COVERED ITEM OR SERVICE: HCPCS | Performed by: STUDENT IN AN ORGANIZED HEALTH CARE EDUCATION/TRAINING PROGRAM

## 2018-11-20 PROCEDURE — 80048 BASIC METABOLIC PNL TOTAL CA: CPT

## 2018-11-20 PROCEDURE — 12000008 ZZH R&B INTERMEDIATE UMMC

## 2018-11-20 PROCEDURE — 40000133 ZZH STATISTIC OT WARD VISIT

## 2018-11-20 PROCEDURE — 85025 COMPLETE CBC W/AUTO DIFF WBC: CPT | Performed by: INTERNAL MEDICINE

## 2018-11-20 PROCEDURE — 25000132 ZZH RX MED GY IP 250 OP 250 PS 637: Performed by: INTERNAL MEDICINE

## 2018-11-20 PROCEDURE — 84439 ASSAY OF FREE THYROXINE: CPT | Performed by: INTERNAL MEDICINE

## 2018-11-20 PROCEDURE — 83735 ASSAY OF MAGNESIUM: CPT | Performed by: INTERNAL MEDICINE

## 2018-11-20 PROCEDURE — 25000128 H RX IP 250 OP 636: Performed by: INTERNAL MEDICINE

## 2018-11-20 RX ORDER — WARFARIN SODIUM 2.5 MG/1
2.5 TABLET ORAL
Status: COMPLETED | OUTPATIENT
Start: 2018-11-20 | End: 2018-11-20

## 2018-11-20 RX ADMIN — SODIUM CHLORIDE 250 ML: 9 INJECTION, SOLUTION INTRAVENOUS at 08:21

## 2018-11-20 RX ADMIN — MESALAMINE 2400 MG: 800 TABLET, DELAYED RELEASE ORAL at 19:31

## 2018-11-20 RX ADMIN — MONTELUKAST SODIUM 10 MG: 10 TABLET, COATED ORAL at 19:31

## 2018-11-20 RX ADMIN — LEVOTHYROXINE SODIUM 88 MCG: 88 TABLET ORAL at 08:24

## 2018-11-20 RX ADMIN — AMIODARONE HYDROCHLORIDE 200 MG: 200 TABLET ORAL at 08:24

## 2018-11-20 RX ADMIN — SODIUM CHLORIDE, PRESERVATIVE FREE 5 ML: 5 INJECTION INTRAVENOUS at 18:59

## 2018-11-20 RX ADMIN — MULTIPLE VITAMINS W/ MINERALS TAB 1 TABLET: TAB at 08:24

## 2018-11-20 RX ADMIN — AZELASTINE HYDROCHLORIDE 2 SPRAY: 137 SPRAY, METERED NASAL at 08:24

## 2018-11-20 RX ADMIN — DULOXETINE 20 MG: 20 CAPSULE, DELAYED RELEASE ORAL at 08:24

## 2018-11-20 RX ADMIN — OMEPRAZOLE 40 MG: 20 CAPSULE, DELAYED RELEASE ORAL at 08:24

## 2018-11-20 RX ADMIN — WARFARIN SODIUM 2.5 MG: 2.5 TABLET ORAL at 19:00

## 2018-11-20 RX ADMIN — MESALAMINE 2400 MG: 800 TABLET, DELAYED RELEASE ORAL at 08:24

## 2018-11-20 RX ADMIN — OMEPRAZOLE 40 MG: 20 CAPSULE, DELAYED RELEASE ORAL at 18:59

## 2018-11-20 RX ADMIN — TAMSULOSIN HYDROCHLORIDE 0.4 MG: 0.4 CAPSULE ORAL at 19:00

## 2018-11-20 ASSESSMENT — ACTIVITIES OF DAILY LIVING (ADL)
ADLS_ACUITY_SCORE: 11

## 2018-11-20 NOTE — PLAN OF CARE
Problem: Patient Care Overview  Goal: Plan of Care/Patient Progress Review  Goals:  1. Hemodynamically stable  2. Remains euvolemic   Discharge Planner OT   Patient plan for discharge: Home  Current status: Pt requires SBA and vc's for functional transfers. Pt ambulated ~500ft with SBA, vc's while pushing IV pole. Pt required few standing breaks. Pt tolerated therapy session well. VSS.   Barriers to return to prior living situation: Decreased activity tolerance/strength.   Recommendations for discharge: Home with OP therapy.   Rationale for recommendations: Pt will benefit from continued OP therapy to maximize functional independence and activity tolerance.        Entered by: Porfirio Law 11/20/2018 5:26 PM

## 2018-11-20 NOTE — PROGRESS NOTES
Care Coordinator Progress Note    Admission Date/Time:  11/15/2018  Attending MD:  Jose Alfredo Young MD    Data  Chart reviewed, discussed with interdisciplinary team.   Patient was admitted for: Chronic systolic congestive heart failure (H).    Concerns with insurance coverage for discharge needs: None.  Current Living Situation: Patient lives alone.  Support System: Parents and friends- Supportive and Involved  Services Involved: None- pt was ind. with all cares and mobility prior hospitalization.  Transportation at Discharge: Family or friend will provide  Barriers to Discharge: Medical stability.    Coordination of Care and Referrals: Provided patient/family with options for Home Infusion.       RNCC visited pt to introduce RNCC role and discuss discharge planning.  Pt stated the team have been updating him well about the plan of care.  RNCC discussed about home IV infusion service.  Pt lives alone, but have good friends who lives near by and parents lives near by too.  Pt stated his friends can assist him if he needs help.  Pt stated he never used any home care service in the past, he would like to stay in the [facility] system and agreed to have referral send to [facility].  RNCC explained to pt about home RN service coverage.  Pt stated he is on medical leave and he is home bound.  Referral send to [facility] and request coverage info.  RNCC called [facility] # [id number] and arranged PICC line and home IV inotrope teaching for tomorrow, 11/21 at 9:00.  [facility] reported pt meets criteria and has coverage for home IV Milrinone.  Coverage info have been shared with pt.  Pt agreed with coverage info and discharge planning. Home infusion order placed on the discharge order.     Assessment  Pt is under going LVAD eval.  Bedside RN reported, plan to discharge pt to home with inotrope and complete his LVAD work up as out pt.  RNCC discussed the discharge plan with cards 2 team.  The team stated plan to discharge pt to home tomorrow, 11/21 if stable and  home inotrope service is approved.     Plan  Anticipated Discharge Date:  Tomorrow, 11/21/18  Anticipated Discharge Plan:   Home with IV milrinone.  [facility] will provide the service.  On the day of discharge home care order need to be signed 4 hrs prior discharge for home infusion company to mix the med and deliver it to the hospital.  PLC appointment tomorrow, 11/21 at 9:00.  RNCC will cont to follow plan of care.      Lucía Preston RN, PHN, BSN  4A and 4E/ ICU  Care Coordinator  Phone: [phone]  Pager: [phone]

## 2018-11-20 NOTE — PROCEDURES
Cardiology Progress Note    Overnight/subj:   No events overnight   Feels well today    VS reviewed: Notable for temp: AF, HR 67-87, MAP 79-91, oxygenating on 94% ib RA    Wt: on admit 133, yday 123, today 120  I/O: 750 in / 1.5 out yday. Since MN 14 in / 300 out    Drips:   Milrinone 0.125  PO Cardiac Meds: Amiodorone 200mg daily,  warfarin 5mg daily       Other meds: Azelastine 2sprays BID, Duloxetine 20mg daily, Levothyroxine 88mcg daily, mesalamine 2.4g bid, omeprazole 40mg daily, flomax 0.4mg daily     Radiology Imaging  No new study     Cardiology Studies:  No new study    Labs: Reviewed in epic    Phys exam:  GEN: NAD  Pulm: CTAB  Cardiac: JVP 6cm h20, no murmurs  Vascular: - ESTELLA and +2 pulses  GI: soft, non distended    ASSESSMENT/PLAN:  Everton Larios is a 54yo M pAF s/p ablation, NICM (EF 15%)s/p AICD, ASD s/p repair with persistent left SVC who is presenting with decompensated heart failure and cardiogenic shock requiring inotropes. Now inotrope dependant.      Mild increase in creatinine most likely secondary vasodilation from milrinone. Will hold medications for today.       # NICM (LVEF- 15%) Stage D, Class III   # s/p CRT-D  # p Afib  - Continue Milrinone   - Hold diuretics today given CVP  - Hold Spironolactone   - continue holding BB   - Continue advance therapy work up, plan for home inotrope    - Education on PICC and milrinone managment  - Transfer to the floor  - Neuropsych today    # Hyponatremia/FRANKLIN-CKD, most likely secondary to vasodilation from milrinone + anti-HTN meds  -- discontinued hydralazine/isordil   -- Hold diuretics today given CVP  -- Hold Spironolactone   -- continue holding BB      # UC  - continue home medications         Patient seen and discussed with Dr. Gagandeep Templeton MD  Cardiology Fellow  [id number]          Late entry - pt seen and examined on 11/20/18  Critical Care ICU Note - Cardiology  Corinna Donis M.D.     The patient remains in the ICU with on-going need  for parenteral medications for the adjustment of blood pressure and cardiac output and maintenance of renal function.       The patient is seen for ambulatory cardiogenic shock necessitating inotropic agents, vasopressors and afterload reducing agents; fluid and diuretic management to maintain blood pressure and secondary organ function     Pt's Cr and Na improved today.  Continue to optimize cardiac regimen.  Neuropsych eval completed today.  Pt must maintain complete abstinence from alcohol. Will transfer out of ICU today.  Will plan to discharge with home inotropes to allow time for patient to complete final pieces of LVAD evaluation and confirm care plan.         Corinna Donis MD  Section Head - Advanced Heart Failure, Transplantation and Mechanical Circulatory Support  Co-Director - Adult Congenital and Cardiovascular Genetics Center  Associate Professor of Medicine, [facility]     I spent 40 minutes in critical care of the patient including 20 minutes of direct patient care including serial assessments and discussion with the patient and patient's care team.

## 2018-11-20 NOTE — PLAN OF CARE
Problem: Patient Care Overview  Goal: Plan of Care/Patient Progress Review  Goals:  1. Hemodynamically stable  2. Remains euvolemic   Outcome: Improving                       VSS, Denies pain/Nausea. Given 250cc NS for Na 121-Rechecked Na 123 MD notified.                  Adequate UOP. Remains on Milrinone. Possible transfer to floor today?                 Continue to monitor and intervene as indicated.

## 2018-11-20 NOTE — PLAN OF CARE
Problem: Patient Care Overview  Goal: Plan of Care/Patient Progress Review  Goals:  1. Hemodynamically stable  2. Remains euvolemic   Outcome: No Change  Condition stable, multiple evaluations performed today, planned for pt education for PICC line tomorrow at 0900    Neuro: WDL  CV: stable HR, DDD paced, 2+ pulses, stable BP  Pulm: LS clear on RA saturating well  GI: eating well, no stool today, passing flatus  : voiding with urinal  Skin: WDL  Pain: WDL    Continue to monitor and potentially discharge tomorrow pending teaching and evaluations

## 2018-11-20 NOTE — PROGRESS NOTES
Calorie Count  Intake recorded for: 11/19 Kcals: 252  Protein: 19g  # Meals Recorded: 100% hot roast beef w/ gravy, orange juice  # Supplements Recorded: 0
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
## 2018-11-20 NOTE — PLAN OF CARE
Problem: Cardiac: Heart Failure (Adult)  Goal: Signs and Symptoms of Listed Potential Problems Will be Absent, Minimized or Managed (Cardiac: Heart Failure)  Signs and symptoms of listed potential problems will be absent, minimized or managed by discharge/transition of care (reference Cardiac: Heart Failure (Adult) CPG).   Outcome: No Change  Right Cordis removed. Neuro wdl. CV- paced at 70, normotensive, afebrile, +2 pulses, lyte recheck pending since two samples were contaminated/hemolyzed and lab neglected to call RN to redraw- so delay in treatment- will pass off to night RN. resp- RA. GI/- zofran once for persistent N/V, refused dinner d/t late lunch, 2L FR. Upstairs tomorrow?

## 2018-11-20 NOTE — PROGRESS NOTES
Addendum 11/23/18  1420  Home Infusion  Everton is discharging today and  going home on continuous milrinone therapy.  He requires a hook up of home medication and pump prior to dc.    Met with Everton at bedside once supplies arrived.  Porvided hook up of continuous home milrinone after reviewing pump settings and verifying with orders.  Instructed Everton not to flush his lumen between bag changes.  Provided information on plan for SNV, supplies and ongoing supply deliveries, storage of medication, back up pump, 24/7 availability of I staff and how to contact as needed while on home IV therapy.    Everton verbalized understanding of information given.   He will be seen at home tomorrow by [facility]  for first bag change.   Everton feels comfortable discharging and managing the IV therapy at home once teaching is completed.    Everton's home milrinone infusion is running and he is ready for discharge from [facility] perspective.    Home Infusion  Everton is expected to discontinue within a day or two and will be going home on continuous IV milrinone.  He has never done home IV therapy before however he has an appt at the [facility] tomorrow morning for initial teaching.  Met with Everton at bedside and provided him with information about [facility] services.  Explained about administration method, the pump andfanny pack used , back up pump and need for daily medication bag changes .   I informed him that I or another I nurse (depending on day) would assist with hook up of his home medication here at the hospital on day of discharge  and he will have a follow up nurse visit at home the following day to teach the daily bag changes.   Informed him about supplies and delivery of supplies, storage of medication, continuous infusion requirements, plan for SNV and 24/7 availability of I staff while on IV therapy.     Everton verbalized understanding of all information given.   He is willing and able to learn and manage home IV therapy.  Questions  answered.  Will f/u tomorrow and help facilitate discharge once orders are written.    Misti Frank RN [facility] Infusion Liaison  [phone] pager

## 2018-11-21 ENCOUNTER — DOCUMENTATION ONLY (OUTPATIENT)
Dept: CARDIOLOGY | Facility: CLINIC | Age: 53
End: 2018-11-21

## 2018-11-21 LAB
ANION GAP SERPL CALCULATED.3IONS-SCNC: 11 MMOL/L (ref 3–14)
ANION GAP SERPL CALCULATED.3IONS-SCNC: 9 MMOL/L (ref 3–14)
BASE DEFICIT BLDV-SCNC: 3.1 MMOL/L
BASOPHILS # BLD AUTO: 0 10E9/L (ref 0–0.2)
BASOPHILS NFR BLD AUTO: 0.2 %
BUN SERPL-MCNC: 42 MG/DL (ref 7–30)
BUN SERPL-MCNC: 52 MG/DL (ref 7–30)
CALCIUM SERPL-MCNC: 8.8 MG/DL (ref 8.5–10.1)
CALCIUM SERPL-MCNC: 8.9 MG/DL (ref 8.5–10.1)
CHLORIDE SERPL-SCNC: 97 MMOL/L (ref 94–109)
CHLORIDE SERPL-SCNC: 97 MMOL/L (ref 94–109)
CO2 SERPL-SCNC: 17 MMOL/L (ref 20–32)
CO2 SERPL-SCNC: 18 MMOL/L (ref 20–32)
CREAT SERPL-MCNC: 0.95 MG/DL (ref 0.66–1.25)
CREAT SERPL-MCNC: 1.07 MG/DL (ref 0.66–1.25)
DIFFERENTIAL METHOD BLD: ABNORMAL
EOSINOPHIL # BLD AUTO: 0.3 10E9/L (ref 0–0.7)
EOSINOPHIL NFR BLD AUTO: 3.6 %
ERYTHROCYTE [DISTWIDTH] IN BLOOD BY AUTOMATED COUNT: 20.3 % (ref 10–15)
GFR SERPL CREATININE-BSD FRML MDRD: 72 ML/MIN/1.7M2
GFR SERPL CREATININE-BSD FRML MDRD: 82 ML/MIN/1.7M2
GLUCOSE SERPL-MCNC: 111 MG/DL (ref 70–99)
GLUCOSE SERPL-MCNC: 91 MG/DL (ref 70–99)
HCO3 BLDV-SCNC: 19 MMOL/L (ref 21–28)
HCT VFR BLD AUTO: 54.9 % (ref 40–53)
HGB BLD-MCNC: 17.7 G/DL (ref 13.3–17.7)
IMM GRANULOCYTES # BLD: 0 10E9/L (ref 0–0.4)
IMM GRANULOCYTES NFR BLD: 0.2 %
INR PPP: 3.01 (ref 0.9–1.1)
INR PPP: 3.02 (ref 0.86–1.14)
LYMPHOCYTES # BLD AUTO: 1.6 10E9/L (ref 0.8–5.3)
LYMPHOCYTES NFR BLD AUTO: 19.5 %
MAGNESIUM SERPL-MCNC: 2.6 MG/DL (ref 1.6–2.3)
MCH RBC QN AUTO: 28.8 PG (ref 26.5–33)
MCHC RBC AUTO-ENTMCNC: 32.2 G/DL (ref 31.5–36.5)
MCV RBC AUTO: 89 FL (ref 78–100)
MONOCYTES # BLD AUTO: 1.2 10E9/L (ref 0–1.3)
MONOCYTES NFR BLD AUTO: 14.4 %
NEUTROPHILS # BLD AUTO: 5 10E9/L (ref 1.6–8.3)
NEUTROPHILS NFR BLD AUTO: 62.1 %
NRBC # BLD AUTO: 0 10*3/UL
NRBC BLD AUTO-RTO: 0 /100
O2/TOTAL GAS SETTING VFR VENT: 21 %
OXYHGB MFR BLDV: 90 %
PCO2 BLDV: 29 MM HG (ref 40–50)
PH BLDV: 7.43 PH (ref 7.32–7.43)
PLATELET # BLD AUTO: 185 10E9/L (ref 150–450)
PO2 BLDV: 65 MM HG (ref 25–47)
POTASSIUM SERPL-SCNC: 3.9 MMOL/L (ref 3.4–5.3)
POTASSIUM SERPL-SCNC: 4.6 MMOL/L (ref 3.4–5.3)
RBC # BLD AUTO: 6.14 10E12/L (ref 4.4–5.9)
SODIUM SERPL-SCNC: 124 MMOL/L (ref 133–144)
SODIUM SERPL-SCNC: 125 MMOL/L (ref 133–144)
WBC # BLD AUTO: 8 10E9/L (ref 4–11)

## 2018-11-21 PROCEDURE — 36415 COLL VENOUS BLD VENIPUNCTURE: CPT | Performed by: INTERNAL MEDICINE

## 2018-11-21 PROCEDURE — 25000132 ZZH RX MED GY IP 250 OP 250 PS 637: Performed by: INTERNAL MEDICINE

## 2018-11-21 PROCEDURE — 85610 PROTHROMBIN TIME: CPT | Performed by: INTERNAL MEDICINE

## 2018-11-21 PROCEDURE — 82805 BLOOD GASES W/O2 SATURATION: CPT | Performed by: INTERNAL MEDICINE

## 2018-11-21 PROCEDURE — 83735 ASSAY OF MAGNESIUM: CPT | Performed by: INTERNAL MEDICINE

## 2018-11-21 PROCEDURE — 25000128 H RX IP 250 OP 636: Performed by: INTERNAL MEDICINE

## 2018-11-21 PROCEDURE — 99222 1ST HOSP IP/OBS MODERATE 55: CPT | Performed by: NURSE PRACTITIONER

## 2018-11-21 PROCEDURE — 85025 COMPLETE CBC W/AUTO DIFF WBC: CPT | Performed by: INTERNAL MEDICINE

## 2018-11-21 PROCEDURE — 99233 SBSQ HOSP IP/OBS HIGH 50: CPT | Mod: GC | Performed by: INTERNAL MEDICINE

## 2018-11-21 PROCEDURE — 80048 BASIC METABOLIC PNL TOTAL CA: CPT | Performed by: INTERNAL MEDICINE

## 2018-11-21 PROCEDURE — A9270 NON-COVERED ITEM OR SERVICE: HCPCS | Performed by: INTERNAL MEDICINE

## 2018-11-21 PROCEDURE — 25000132 ZZH RX MED GY IP 250 OP 250 PS 637: Performed by: STUDENT IN AN ORGANIZED HEALTH CARE EDUCATION/TRAINING PROGRAM

## 2018-11-21 PROCEDURE — 12000012 ZZH R&B MS OVERFLOW UMMC

## 2018-11-21 PROCEDURE — A9270 NON-COVERED ITEM OR SERVICE: HCPCS | Performed by: STUDENT IN AN ORGANIZED HEALTH CARE EDUCATION/TRAINING PROGRAM

## 2018-11-21 RX ORDER — FUROSEMIDE 10 MG/ML
20 INJECTION INTRAMUSCULAR; INTRAVENOUS ONCE
Status: COMPLETED | OUTPATIENT
Start: 2018-11-21 | End: 2018-11-21

## 2018-11-21 RX ADMIN — MILRINONE LACTATE 0.12 MCG/KG/MIN: 200 INJECTION, SOLUTION INTRAVENOUS at 13:38

## 2018-11-21 RX ADMIN — MESALAMINE 2400 MG: 800 TABLET, DELAYED RELEASE ORAL at 08:35

## 2018-11-21 RX ADMIN — Medication 1 MG: at 20:58

## 2018-11-21 RX ADMIN — AZELASTINE HYDROCHLORIDE 2 SPRAY: 137 SPRAY, METERED NASAL at 08:35

## 2018-11-21 RX ADMIN — AZELASTINE HYDROCHLORIDE 2 SPRAY: 137 SPRAY, METERED NASAL at 19:50

## 2018-11-21 RX ADMIN — AMIODARONE HYDROCHLORIDE 200 MG: 200 TABLET ORAL at 08:34

## 2018-11-21 RX ADMIN — OMEPRAZOLE 40 MG: 20 CAPSULE, DELAYED RELEASE ORAL at 08:34

## 2018-11-21 RX ADMIN — DULOXETINE 20 MG: 20 CAPSULE, DELAYED RELEASE ORAL at 08:34

## 2018-11-21 RX ADMIN — LEVOTHYROXINE SODIUM 88 MCG: 88 TABLET ORAL at 08:34

## 2018-11-21 RX ADMIN — MONTELUKAST SODIUM 10 MG: 10 TABLET, COATED ORAL at 19:50

## 2018-11-21 RX ADMIN — MESALAMINE 2400 MG: 800 TABLET, DELAYED RELEASE ORAL at 19:50

## 2018-11-21 RX ADMIN — TAMSULOSIN HYDROCHLORIDE 0.4 MG: 0.4 CAPSULE ORAL at 16:54

## 2018-11-21 RX ADMIN — MULTIPLE VITAMINS W/ MINERALS TAB 1 TABLET: TAB at 08:35

## 2018-11-21 RX ADMIN — FUROSEMIDE 20 MG: 10 INJECTION, SOLUTION INTRAVENOUS at 18:56

## 2018-11-21 RX ADMIN — Medication 1 MG: at 00:42

## 2018-11-21 RX ADMIN — OMEPRAZOLE 40 MG: 20 CAPSULE, DELAYED RELEASE ORAL at 18:30

## 2018-11-21 ASSESSMENT — ACTIVITIES OF DAILY LIVING (ADL)
ADLS_ACUITY_SCORE: 11

## 2018-11-21 NOTE — PLAN OF CARE
Problem: Patient Care Overview  Goal: Plan of Care/Patient Progress Review  Goals:  1. Hemodynamically stable  2. Remains euvolemic   Outcome: Improving  Condition stable, ambulating in halls, eating well, no stool yet    Neuro: WDL  CV: stable HR, Paced DDR 70-80, stable BP, 2+ pulses throughout  Pulm: LS clear on RA saturating well  GI: eating well, no stool, passing flatus, active bowel sounds  : voiding well  Skin: WDL  Pain: WDL    Continue to monitor Na levels to determine appropriateness for discharge

## 2018-11-21 NOTE — CONSULTS
"[facility], [place]    Palliative Care Consultation Note    Patient: Everton Larios  Date of Admission:  11/15/2018    Requesting provider/team: Cardiology II  Reason for consult: pre-LVAD workup.      Assessment & Plan   Everton Larios is a 53 year old male with advanced heart failure undergoing a pre-LVAD assessment. Reports that he uses his \" will to not be depressed\" to help him cope, stating that sometimes he will just wake up in the morning and decide he needs to make a choice to be more positive. He admits that he has used alcohol in the past but that he hasn't used alcohol to cope in recent years. His family is also supportive of him during the times he needs them for emotional support. He plans on moving in his mother and fathers house in [place] following LVAD placement. He reports his parents and brother who live in [place] will be katy to assist him, but is amenable to going to rehab prior to moving into his parents house 2/2 stairs.  - per today's review it would appear from a palliative care perspective Everton is an appropriate candidate for LVAD placement. He notes his parents and his brother are willing to be trained in the device and its cares and he is focused short term on the positive aspects of advanced heart failure therapy.  - He is apprehensive about the discomfort/ stress of post op time. Would like to talk with a patient who has lived the experience. LVAD  is setting up this encounter.        Palliative Care questions for pre- LVAD patients:    What are your personal hopes/goals for receiving the LVAD? Both long and short term goals for LVAD placement. Short term: would like to get back to work ([employer] ) and have symptom improvement. Long term: he is looking forward to spending more time with family and watching his nieces and nephews grow up.     What are the activities/abilities that make your life worth living? Patient currently " "stated that he doesn't have activities/abilities, but is looking forward to finding new hobbies. 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    What does a typical day look like for you? 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 Occasionally, spending time at local [employer] and [employer] for \"People watching.\" Admittedly spends a considerable amount of time at home watching television. Reports being the type of person who needs tasks to consume his time.     There are risks for kidney failure in patients with advancing heart disease who need LVAD support.  The places/locations that offer dialysis and accept patients with LVADs may be limited in your community.  What do you know about dialysis? How would your possible need for dialysis influence your quality of life? Has previously thought about HD as his dad is currently in his 3rd year of HD. Stated he would be interested in HD long term, but would like the help of his family to make that decision were to to be needed. Stated, \"You never really know what you want until you are the person experiencing it.\"     The need to be on a ventilator/breathing machine through a tracheostomy (a tube in your neck) is a risk with LVAD. What are with circumstances you would want your medical providers and family to keep you alive with long term mechanical ventilation? Patient would be ok with a tracheostomy as he feels he has committed to an LVAD he should also commit to procedures to keep him alive. In addition, he added that he would only to remain on a ventilator if it was \"Reasonable.\" When asked what that meant, he stated that he would want to be \"Vegetable\" and explained he would only want extented mechanical ventilation if it meant he would be able to " "recover and take himself to the bathroom, shower, and get around on his own. He specifically said he would not be interested in long term mechanical intubation if it meant he would have to be turned frequently for \"clean up and looking for bed sores.\"    An LVAD carries a risk of stroke which, for some people, may limit their ability to communicate their wishes to others.  After a stroke, what sort of outcomes would be unacceptable to you (ie needing to live in nursing facility, loss of independence.. Etc.) Patient admits that he doesn't want to think about this, but would want to continue with restorative care as long as it was reasonable (as explained in previous question).     LVAD s are often placed with future heart transplant consideration. Some of our patients are determined not to be heart transplant candidates, or choose to forego heart transplant surgery for personal reasons.  If you had an LVAD placed inside of you for the remainder of your life, what would be your concerns? Patient stated that he is not thinking about a heart transplant at this time. He is only focusing on a LVAD. He then went on to say that he is focused on quality over quantity and he would be interested in continuing with the LVAD as long as his life was \"reasonable,\" as stated above.    What circumstances or events would lead you to decide or ask your health care agent to decide that you would want the LVAD turned off and be allowed to die a natural death? Patient stated he would want the LVAD shut off if he could not longer get out of bed on his own or do anything for himself.       History of Present Illness     Everton Larios is a 53 year old male with history of atrial fibrillation and atrial fibrillation ablation, nonischemic cardiomyopathy (EF 15%), congenital ASD repair in childhood, s/p AICD (2008, generator change 2018), hypothyroidism, and remote history of GI bleed and splenic embolization who presented after [facility] for " inotrope support and accelerated LVAD assessment secondary to increased fluid accumulation and activity intolerance.     ROS:  Palliative Symptom Review (0=no symptom/no concern, 1=mild, 2=moderate, 3=severe):  Pain: 0  Fatigue: 1  Nausea: 0 (resolved)  Constipation: 0  Diarrhea: 0  Depressive Symptoms: 0  Anxiety: 0  Drowsiness: 0  Poor Appetite: 0  Shortness of Breath: 1  Insomnia: 0  Delirium: 0  Other: 0  Overall (0 good/no concerns, 3 very poor): 0    Past Medical History    I have reviewed this patient's medical history and updated it with pertinent information if needed.   Past Medical History:   Diagnosis Date     Pierce's esophagus 10/4/2018     Chronic rhinitis 10/4/2018     Chronic systolic heart failure (H) 10/4/2018     Depression 10/4/2018     DJD (degenerative joint disease) - neck 10/4/2018     H/O congenital atrial septal defect (ASD) repair at age 3 10/4/2018     Hypothyroidism due to medication 10/4/2018     ICD, [employer] 2008; gen change 2/2018 10/4/2018     Intermittent asthma without complication 10/4/2018     Nonischemic cardiomyopathy (H) 10/4/2018     On amiodarone therapy 10/4/2018     Paroxysmal atrial fibrillation (H) 10/4/2018     S/P ablation of atrial fibrillation 10/4/2018     Ulcerative colitis (H) 10/4/2018     Ventricular tachycardia (H) 10/4/2018       Past Surgical History     1. ASD repair 1968  2. Carpal tunnel release  3. ICD placement      Social History   Social History   Substance Use Topics     Smoking status: Former Smoker     Years: 10.00     Smokeless tobacco: Never Used     Alcohol use 0.6 oz/week     1 Cans of beer per week      Comment: a couple times a week        Family History     Mother had in cancer remission.  Father has type II diabetes and CKD requiring HD 3x/week. Peripheral neuropathy and vasculopathy with toe amputations    Medications   I have reviewed this patient's medication profile and medications given in the past 24 hours.  Current  Facility-Administered Medications   Medication     acetaminophen (TYLENOL) tablet 650 mg    Or     acetaminophen (TYLENOL) Suppository 650 mg     albuterol (PROAIR HFA/PROVENTIL HFA/VENTOLIN HFA) 108 (90 Base) MCG/ACT inhaler 2 puff     amiodarone (PACERONE/CODARONE) tablet 200 mg     azelastine (ASTELIN) nasal spray 2 spray     DULoxetine (CYMBALTA) EC capsule 20 mg     heparin lock flush 10 UNIT/ML injection 2-5 mL     heparin lock flush 10 UNIT/ML injection 5-10 mL     heparin lock flush 10 UNIT/ML injection 5-10 mL     HOLD nitroGLYcerin IF     levothyroxine (SYNTHROID/LEVOTHROID) tablet 88 mcg     lidocaine (LMX4) cream     lidocaine (LMX4) cream     lidocaine 1 % 1 mL     magnesium sulfate 2 g in NS intermittent infusion (PharMEDium or FV Cmpd)     magnesium sulfate 4 g in 100 mL sterile water (premade)     melatonin tablet 1 mg     mesalamine (ASACOL HD) EC tablet 2,400 mg     milrinone (PRIMACOR) infusion 200 mcg/mL PREMIX     montelukast (SINGULAIR) tablet 10 mg     multivitamin, therapeutic with minerals (THERA-VIT-M) tablet 1 tablet     naloxone (NARCAN) injection 0.1-0.4 mg     omeprazole (priLOSEC) CR capsule 40 mg     ondansetron (ZOFRAN) injection 4 mg     ondansetron (ZOFRAN-ODT) ODT tab 4 mg     Patient is already receiving anticoagulation with heparin, enoxaparin (LOVENOX), warfarin (COUMADIN)  or other anticoagulant medication     Patient is already receiving anticoagulation with heparin, enoxaparin (LOVENOX), warfarin (COUMADIN)  or other anticoagulant medication     potassium chloride (KLOR-CON) Packet 20-40 mEq     potassium chloride 10 mEq in 100 mL intermittent infusion with 10 mg lidocaine     potassium chloride 10 mEq in 100 mL sterile water intermittent infusion (premix)     potassium chloride 20 mEq in 50 mL intermittent infusion     potassium chloride SA (K-DUR/KLOR-CON M) CR tablet 20-40 mEq     prochlorperazine (COMPAZINE) injection 5 mg    Or     prochlorperazine (COMPAZINE) tablet  5-10 mg    Or     prochlorperazine (COMPAZINE) Suppository 25 mg     Reason ACE/ARB/ARNI order not selected     Reason beta blocker not prescribed     sodium chloride (PF) 0.9% PF flush 10-20 mL     sodium chloride (PF) 0.9% PF flush 20 mL     sodium chloride (PF) 0.9% PF flush 3 mL     sodium chloride (PF) 0.9% PF flush 3 mL     tamsulosin (FLOMAX) capsule 0.4 mg     Warfarin Therapy Reminder (Check START DATE - warfarin may be starting in the FUTURE)     warfarin-No DOSE today       Physical Exam   Vital Signs: Temp: 98.2  F (36.8  C) Temp src: Oral BP: 96/74   Heart Rate: 73 Resp: 16 SpO2: 97 % O2 Device: None (Room air)    Weight: 119 lbs .77 oz  General: Sitting up in chair in ICU and no apparent distress.  HEENT:  NC/AT. EOMI. MM mildly dry  Cardiac: RRR. No m/r/g. Normal S1, S2. No edema. HR paced at 66 bpm   Pulm: symmetric chest rise and fall, lungs clear with no wheezes or crackles.  Abd: +BS, soft, distended, non tender.   Skin: No rash  MSK: No deformities, no joint swelling  Extremities: Distal extremities cool to touch with mild clubbing in BUE digits  Neuro: CN grossly intact, no focal deficits. No tremor.  Psych: Euthymic mood, pleasant affect. Alert and oriented X4    Data:   ROUTINE LABS (Last four results)  BMP  Recent Labs  Lab 11/21/18  0309 11/20/18  1515 11/20/18  0337 11/19/18  1845   * 123* 123* 121*   POTASSIUM 3.9 5.0 4.4 4.9   CHLORIDE 97 95 95 92*   RADAMES 8.9 9.2 9.5 9.4   CO2 17* 17* 16* 20   BUN 52* 64* 66* 61*   CR 1.07 1.27* 1.41* 1.58*   GLC 91 111* 100* 110*     CBC  Recent Labs  Lab 11/21/18  0309 11/20/18  0337 11/19/18  0352 11/18/18  0331   WBC 8.0 8.3 8.5 7.9   RBC 6.14* 6.24* 6.21* 6.02*   HGB 17.7 18.5* 18.3* 17.4   HCT 54.9* 55.0* 54.2* 52.3   MCV 89 88 87 87   MCH 28.8 29.6 29.5 28.9   MCHC 32.2 33.6 33.8 33.3   RDW 20.3* 20.7* 20.6* 20.1*    205 225 231     INR  Recent Labs  Lab 11/21/18  0309 11/20/18  0337 11/19/18  0352 11/18/18  0331   INR 3.02* 2.65* 2.36*  2.13*         Thank you for the opportunity to participate in the care of this patient. Please feel free to contact the on-call Palliative provider with any urgent needs.    Renato Burnette NP, [facility]  Pager: [phone]  [facility] Inpatient Team Consult pager [phone] (M-F 8-4:30)  After-hours Answering Service [phone]  Palliative Clinic: [phone]     Total time spent was 50 minutes,  >50% of time was spent counseling and/or coordination of care   min of that were spent face-to-face.

## 2018-11-21 NOTE — PLAN OF CARE
Problem: Patient Care Overview  Goal: Plan of Care/Patient Progress Review  Goals:  1. Hemodynamically stable  2. Remains euvolemic   Outcome: No Change  Pt alert and oriented x4. Follows commands and moves all extremities. HR 70-80s. BP stables with systolics in the 's. Pt on 0.125 of milrinone in DDD pacemaker. Pulse +2 in all extremities. Lungs are clear. Bowel sounds normoactive. No BM overnight. Pt on regular diet with 2L fluid restriction. Pt voiding well-using the urinal. Skin integrity is good. Pt denies pain. AM labs show sodium up to 125.Plan is to have education regarding PICC today with possible discharge. Continue to monitor patient and follow plan of care.

## 2018-11-21 NOTE — PROGRESS NOTES
CLINICAL NUTRITION SERVICES - brief note. See 11/17 note for full assessment.    -Calorie counts:  11/20: 479 kcal, 32 g PRO (this does not include a hamburger for dinner + gelatein that was not recorded)  11/19: 252 kcal, 19 g PRO  11/18: no intake recorded    -Pt states his appetite is much improved, nausea resolved with changes to some medications. He is going home soon (hopefully tomorrow) and thinking about some foods he might make at home (roast in the crockpot, et). He does not like Boost, but OK with the orange gelatein. He states he has been eating the gelatein, which has not been included in the above calorie counts.     Recommendations/Nutrition Prescription  Continue to monitor intakes - PO intake and appetite much improved today and yesterday.     Interventions  Education - encouraged ongoing PO intakes, high kcal/protein foods, ONS as tolerated, small/frequent meals.    Adjusted supplements per pt preference.    RD will continue to follow.    Amber Lemon RD, LD, [facility]  CVICU Dietitian  Pager: [phone]

## 2018-11-21 NOTE — CONSULTS
"Dental Service Consultation        Everton Larios MRN# [id number]   YOB: 1965 Age: 53 year old   Date of Admission: 11/15/2018     Reason for consult: I was asked by Madeleine Negrete to evaluate this patient for pre-LVAD dental clearance.           Assessment and Plan:   Assessment: Patients oral health is stable and appears healthy. Cannot make a final determination until x-rays are completed and comprehensive exam. Patient is in no dental pain and had dental treatment within the last 2-3 years.  Patient has no signs of infection, no swelling, no redness, no inflammation, no purulence.  Plan:   Schedule patient for a comprehensive exam at [facility] dental clinic before LVAD placement. No need for dental CT since patient can come to outpatient dental clinic where diagnostic x-rays can be completed.  Pt seems highly motivated and already made an exam at a clinic is [place] but it's not until Dec. We recommend patient is seen sooner in our clinic because we have availability next week and therefore pt can proceed with LVAD placement sooner. Also we can be in direct communication with doctors at the [facility]. At the end of the consult the pt's nurse Franko was given our clinic phone number and asked him or  to help coordinate/schedule the patients appt.  Clinic phone #: [phone]             Chief Complaint:   \"I have generalized cold sensitivity no other issues, last dental visit was for deep cleaning around 2-3 yrs ago\"   History obtained from patient         History of Present Illness:   This patient is a 53 year old male who was admitted with heart failure exacerbation.  He has a remote history of ASD repair as a child.  He has had atrial fibrillation and ablation.  He has recent history of moderate ETOH use.  He has been abstinent from ETOH for 1 month               Past Medical History:     Past Medical History:   Diagnosis Date     Pierce's esophagus 10/4/2018     Chronic rhinitis " 10/4/2018     Chronic systolic heart failure (H) 10/4/2018     Depression 10/4/2018     DJD (degenerative joint disease) - neck 10/4/2018     H/O congenital atrial septal defect (ASD) repair at age 3 10/4/2018     Hypothyroidism due to medication 10/4/2018     ICD, [employer] 2008; gen change 2/2018 10/4/2018     Intermittent asthma without complication 10/4/2018     Nonischemic cardiomyopathy (H) 10/4/2018     On amiodarone therapy 10/4/2018     Paroxysmal atrial fibrillation (H) 10/4/2018     S/P ablation of atrial fibrillation 10/4/2018     Ulcerative colitis (H) 10/4/2018     Ventricular tachycardia (H) 10/4/2018             Past Surgical History:   History reviewed. No pertinent surgical history.            Social History:     Social History   Substance Use Topics     Smoking status: Former Smoker     Years: 10.00     Smokeless tobacco: Never Used     Alcohol use 0.6 oz/week     1 Cans of beer per week      Comment: a couple times a week              Family History:   History reviewed. No pertinent family history.          Immunizations:     There is no immunization history on file for this patient.          Allergies:     Allergies   Allergen Reactions     Lisinopril Other (See Comments)     hypotension     Codeine Nausea             Medications:     Current Facility-Administered Medications Ordered in Epic   Medication Dose Route Frequency Last Rate Last Dose     acetaminophen (TYLENOL) tablet 650 mg  650 mg Oral Q4H PRN   650 mg at 11/19/18 2030    Or     acetaminophen (TYLENOL) Suppository 650 mg  650 mg Rectal Q4H PRN         albuterol (PROAIR HFA/PROVENTIL HFA/VENTOLIN HFA) 108 (90 Base) MCG/ACT inhaler 2 puff  2 puff Inhalation Q6H PRN         amiodarone (PACERONE/CODARONE) tablet 200 mg  200 mg Oral Daily   200 mg at 11/20/18 0824     azelastine (ASTELIN) nasal spray 2 spray  2 spray Both Nostrils BID   2 spray at 11/20/18 0824     DULoxetine (CYMBALTA) EC capsule 20 mg  20 mg Oral Daily   20 mg  at 11/20/18 0824     heparin lock flush 10 UNIT/ML injection 2-5 mL  2-5 mL Intracatheter Once PRN         heparin lock flush 10 UNIT/ML injection 5-10 mL  5-10 mL Intracatheter Q24H   5 mL at 11/20/18 1859     heparin lock flush 10 UNIT/ML injection 5-10 mL  5-10 mL Intracatheter Q1H PRN         HOLD nitroGLYcerin IF   Does not apply HOLD         levothyroxine (SYNTHROID/LEVOTHROID) tablet 88 mcg  88 mcg Oral Daily   88 mcg at 11/20/18 0824     lidocaine (LMX4) cream   Topical Q1H PRN         lidocaine (LMX4) cream   Topical Once PRN         lidocaine 1 % 1 mL  1 mL Other Q1H PRN         magnesium sulfate 2 g in NS intermittent infusion (PharMEDium or FV Cmpd)  2 g Intravenous Daily PRN         magnesium sulfate 4 g in 100 mL sterile water (premade)  4 g Intravenous Q4H PRN         melatonin tablet 1 mg  1 mg Oral At Bedtime PRN   1 mg at 11/19/18 2030     mesalamine (ASACOL HD) EC tablet 2,400 mg  2,400 mg Oral BID   2,400 mg at 11/20/18 1931     milrinone (PRIMACOR) infusion 200 mcg/mL PREMIX  0.125 mcg/kg/min Intravenous Continuous 2 mL/hr at 11/20/18 2100 0.125 mcg/kg/min at 11/20/18 2100     montelukast (SINGULAIR) tablet 10 mg  10 mg Oral QPM   10 mg at 11/20/18 1931     multivitamin, therapeutic with minerals (THERA-VIT-M) tablet 1 tablet  1 tablet Oral Daily   1 tablet at 11/20/18 0824     naloxone (NARCAN) injection 0.1-0.4 mg  0.1-0.4 mg Intravenous Q2 Min PRN         omeprazole (priLOSEC) CR capsule 40 mg  40 mg Oral BID w/meals   40 mg at 11/20/18 1859     ondansetron (ZOFRAN) injection 4 mg  4 mg Intravenous Q6H PRN   4 mg at 11/19/18 0743     ondansetron (ZOFRAN-ODT) ODT tab 4 mg  4 mg Oral Q8H PRN   4 mg at 11/19/18 1530     Patient is already receiving anticoagulation with heparin, enoxaparin (LOVENOX), warfarin (COUMADIN)  or other anticoagulant medication   Does not apply Continuous PRN         Patient is already receiving anticoagulation with heparin, enoxaparin (LOVENOX), warfarin (COUMADIN)  or  other anticoagulant medication   Does not apply Continuous PRN         potassium chloride (KLOR-CON) Packet 20-40 mEq  20-40 mEq Oral or Feeding Tube Q2H PRN         potassium chloride 10 mEq in 100 mL intermittent infusion with 10 mg lidocaine  10 mEq Intravenous Q1H PRN         potassium chloride 10 mEq in 100 mL sterile water intermittent infusion (premix)  10 mEq Intravenous Q1H PRN         potassium chloride 20 mEq in 50 mL intermittent infusion  20 mEq Intravenous Q1H PRN         potassium chloride SA (K-DUR/KLOR-CON M) CR tablet 20-40 mEq  20-40 mEq Oral Q2H PRN   40 mEq at 11/16/18 1511     prochlorperazine (COMPAZINE) injection 5 mg  5 mg Intravenous Q6H PRN   5 mg at 11/19/18 1325    Or     prochlorperazine (COMPAZINE) tablet 5-10 mg  5-10 mg Oral Q6H PRN        Or     prochlorperazine (COMPAZINE) Suppository 25 mg  25 mg Rectal Q12H PRN         Reason ACE/ARB/ARNI order not selected   Other DOES NOT GO TO MAR         Reason beta blocker not prescribed   Does not apply DOES NOT GO TO MAR         sodium chloride (PF) 0.9% PF flush 10-20 mL  10-20 mL Intracatheter Q1H PRN         sodium chloride (PF) 0.9% PF flush 20 mL  20 mL Intracatheter Once         sodium chloride (PF) 0.9% PF flush 3 mL  3 mL Intracatheter Q1H PRN   3 mL at 11/17/18 0744     sodium chloride (PF) 0.9% PF flush 3 mL  3 mL Intracatheter Q8H   3 mL at 11/20/18 1937     tamsulosin (FLOMAX) capsule 0.4 mg  0.4 mg Oral Daily with supper   0.4 mg at 11/20/18 1900     Warfarin Therapy Reminder (Check START DATE - warfarin may be starting in the FUTURE)  1 each Does not apply Continuous PRN         No current [facility]-ordered outpatient prescriptions on file.             Review of Systems:   The 10 point Review of Systems is negative other than noted in the HPI            Physical Exam:   Vitals were reviewed  Temp: 97.5  F (36.4  C) Temp src: Oral BP: 110/83   Heart Rate: 80 Resp: 18 SpO2: 95 % O2 Device: None (Room air)      Head and neck exam:  WNL, no swelling, no lymphodenopathy    Intraoral exam: WNL, no lesion, no broken teeth, generalized moderate recession, no signs of infection or caries        Data:   Radiographic interpretation: No images taken     The patient was discussed with: Franko (pts nurse this morning) after consult     Brii White DMD   PGY1  Pager: [phone]  [phone] x [phone]

## 2018-11-21 NOTE — CONSULTS
Pt was seen and examined today as part of a consult request for Palliative care  This note was inadvertantly flied as a progress note. Please refer to progress note section for this consult note  Thanks    Renato LUJAN NP  Nurse Practitioner- Lead Advanced Practice Provider  [facility] Palliative Medicine Consult Service   [phone]

## 2018-11-21 NOTE — PROGRESS NOTES
Calorie Count  Intake recorded for: 11/20 Kcals: 479  Protein: 32g  # Meals Recorded: 2 meals (First - 100% corn flakes w/ milk, orange wedges)      (Second - 100% hot roast beef & mashed potatoes w/ gravy, coffee)  # Supplements Recorded: 0

## 2018-11-21 NOTE — PROGRESS NOTES
Everton was shown the cares for his CL and administration of IV Milrinone. It was stressed with him that he could not flush his line in between bag changes and he was able to verbalize that he understood this. He showed how to change out the bag with using the CADD WILSON and we also talked about safety with handling supplies and s/s of infection. He was given all the written material for the class.

## 2018-11-21 NOTE — PROGRESS NOTES
Cardiology Progress Note    Overnight/subj:   No events overnigth   Feels well this am     VS reviewed  Tele: isolated PVCs      Intake/Output Summary (Last 24 hours) at 11/21/18 1752  Last data filed at 11/21/18 1700   Gross per 24 hour   Intake             1588 ml   Output             1575 ml   Net               13 ml     Vitals:    11/19/18 0600 11/20/18 0600 11/21/18 0600   Weight: 56 kg (123 lb 7.3 oz) 54.8 kg (120 lb 13 oz) 54 kg (119 lb 0.8 oz)     Drips:   Milrinone 0.125    PO Cardiac Meds: Amiodorone 200mg daily,  warfarin 5mg daily       Other meds: Azelastine 2sprays BID, Duloxetine 20mg daily, Levothyroxine 88mcg daily, mesalamine 2.4g bid, omeprazole 40mg daily, flomax 0.4mg daily      Radiology Imaging  No new study      Cardiology Studies:  No new study     Labs: Reviewed in epic     Phys exam:  GEN: NAD  Pulm: CTAB  Cardiac: JVP 6cm h20, no murmurs  Vascular: - ESTELLA and +2 pulses  GI: soft, non distended     ASSESSMENT/PLAN:  Everton Larios is a 52yo M pAF s/p ablation, NICM (EF 15%)s/p AICD, ASD s/p repair with persistent left SVC who presented with decompensated heart failure and cardiogenic shock requiring inotropes. Now inotrope dependant.       Mild increase in creatinine most likely secondary vasodilation from milrinone. Will continue to hold afterload reducing meds given SVR and FRANKLIN.    Hx of EtoH Use  I had a zoila discussion with the patient regarding prior etoh use and expected cessation. Patient was instructed that he must cease consumption alcohol to be consider a candidate for transplant or LVAD placement. Furthermore he was instructed that he is expected complete random assessments of metabolites to detect etoh use. Patient express understanding of this plan and agreed with proceeding.     # NICM (LVEF- 15%) Stage D, Class III   # s/p CRT-D  # p Afib  **Will Need CTA Cardiac for venous anomalous return (likely as outpatient once renal function stabilizes)  - Continue Milrinone at 0.125  -  Euvolemic, continue to hold diuretics   - Hold Spironolactone   - continue holding BB   - Continue advance therapy work up, plan for home inotrope    - Patient educated on IV maintenance and milrinone infusion  - Transfer to the floor  - Neuropsych eval completed     # Hyponatremia/FRANKLIN-CKD, most likely secondary to vasodilation from milrinone + anti-HTN meds  -- discontinued hydralazine/isordil   -- Hold diuretics today given CVP  -- Hold Spironolactone   -- continue holding BB       # UC  - continue home medications          Patient discussed with Dr. Gagandeep Templeton MD  Cardiology Fellow  [id number]        I have reviewed today's vital signs, notes, medications, labs and imaging. I have also seen and examined the patient and agree with the findings and plan as outlined above with following addition.  Pt with evidence of mild volume overload on exam this afternoon.  Lasix dose given.  Continue to monitor volume status, renal function and Na levels.    Corinna Donis MD  Section Head - Advanced Heart Failure, Transplantation and Mechanical Circulatory Support  Co-Director - Adult Congenital and Cardiovascular Genetics Center  Associate Professor of Medicine, [employer] [facility]

## 2018-11-22 LAB
ABO + RH BLD: NORMAL
ABO + RH BLD: NORMAL
ANION GAP SERPL CALCULATED.3IONS-SCNC: 10 MMOL/L (ref 3–14)
ANION GAP SERPL CALCULATED.3IONS-SCNC: 10 MMOL/L (ref 3–14)
ANION GAP SERPL CALCULATED.3IONS-SCNC: 7 MMOL/L (ref 3–14)
BLD GP AB SCN SERPL QL: NORMAL
BLOOD BANK CMNT PATIENT-IMP: NORMAL
BUN SERPL-MCNC: 28 MG/DL (ref 7–30)
BUN SERPL-MCNC: 29 MG/DL (ref 7–30)
BUN SERPL-MCNC: 36 MG/DL (ref 7–30)
CALCIUM SERPL-MCNC: 8.1 MG/DL (ref 8.5–10.1)
CALCIUM SERPL-MCNC: 8.4 MG/DL (ref 8.5–10.1)
CALCIUM SERPL-MCNC: 8.7 MG/DL (ref 8.5–10.1)
CHLORIDE SERPL-SCNC: 95 MMOL/L (ref 94–109)
CHLORIDE SERPL-SCNC: 96 MMOL/L (ref 94–109)
CHLORIDE SERPL-SCNC: 97 MMOL/L (ref 94–109)
CO2 SERPL-SCNC: 21 MMOL/L (ref 20–32)
CO2 SERPL-SCNC: 22 MMOL/L (ref 20–32)
CO2 SERPL-SCNC: 22 MMOL/L (ref 20–32)
CORTIS SERPL-MCNC: 14 UG/DL (ref 4–22)
CREAT SERPL-MCNC: 0.73 MG/DL (ref 0.66–1.25)
CREAT SERPL-MCNC: 0.83 MG/DL (ref 0.66–1.25)
CREAT SERPL-MCNC: 0.86 MG/DL (ref 0.66–1.25)
ERYTHROCYTE [DISTWIDTH] IN BLOOD BY AUTOMATED COUNT: 19.5 % (ref 10–15)
GFR SERPL CREATININE-BSD FRML MDRD: >90 ML/MIN/1.7M2
GLUCOSE SERPL-MCNC: 112 MG/DL (ref 70–99)
GLUCOSE SERPL-MCNC: 126 MG/DL (ref 70–99)
GLUCOSE SERPL-MCNC: 65 MG/DL (ref 70–99)
HCT VFR BLD AUTO: 50.2 % (ref 40–53)
HGB BLD-MCNC: 16.5 G/DL (ref 13.3–17.7)
INR PPP: 2.58 (ref 0.86–1.14)
INR PPP: 2.58 (ref 0.9–1.1)
MAGNESIUM SERPL-MCNC: 2.4 MG/DL (ref 1.6–2.3)
MCH RBC QN AUTO: 28.9 PG (ref 26.5–33)
MCHC RBC AUTO-ENTMCNC: 32.9 G/DL (ref 31.5–36.5)
MCV RBC AUTO: 88 FL (ref 78–100)
PHOSPHATE SERPL-MCNC: 2.1 MG/DL (ref 2.5–4.5)
PLATELET # BLD AUTO: 168 10E9/L (ref 150–450)
POTASSIUM SERPL-SCNC: 3.8 MMOL/L (ref 3.4–5.3)
POTASSIUM SERPL-SCNC: 5 MMOL/L (ref 3.4–5.3)
POTASSIUM SERPL-SCNC: 5.5 MMOL/L (ref 3.4–5.3)
RBC # BLD AUTO: 5.71 10E12/L (ref 4.4–5.9)
SODIUM SERPL-SCNC: 126 MMOL/L (ref 133–144)
SODIUM SERPL-SCNC: 127 MMOL/L (ref 133–144)
SODIUM SERPL-SCNC: 127 MMOL/L (ref 133–144)
SPECIMEN EXP DATE BLD: NORMAL
WBC # BLD AUTO: 8 10E9/L (ref 4–11)

## 2018-11-22 PROCEDURE — 25000132 ZZH RX MED GY IP 250 OP 250 PS 637: Performed by: STUDENT IN AN ORGANIZED HEALTH CARE EDUCATION/TRAINING PROGRAM

## 2018-11-22 PROCEDURE — 82533 TOTAL CORTISOL: CPT | Performed by: INTERNAL MEDICINE

## 2018-11-22 PROCEDURE — 85027 COMPLETE CBC AUTOMATED: CPT | Performed by: INTERNAL MEDICINE

## 2018-11-22 PROCEDURE — 21400006 ZZH R&B CCU INTERMEDIATE UMMC

## 2018-11-22 PROCEDURE — 80048 BASIC METABOLIC PNL TOTAL CA: CPT | Performed by: INTERNAL MEDICINE

## 2018-11-22 PROCEDURE — 99232 SBSQ HOSP IP/OBS MODERATE 35: CPT | Mod: GC | Performed by: INTERNAL MEDICINE

## 2018-11-22 PROCEDURE — 25000132 ZZH RX MED GY IP 250 OP 250 PS 637: Performed by: INTERNAL MEDICINE

## 2018-11-22 PROCEDURE — 36415 COLL VENOUS BLD VENIPUNCTURE: CPT | Performed by: INTERNAL MEDICINE

## 2018-11-22 PROCEDURE — 25000125 ZZHC RX 250: Performed by: INTERNAL MEDICINE

## 2018-11-22 PROCEDURE — A9270 NON-COVERED ITEM OR SERVICE: HCPCS | Performed by: INTERNAL MEDICINE

## 2018-11-22 PROCEDURE — A9270 NON-COVERED ITEM OR SERVICE: HCPCS | Performed by: STUDENT IN AN ORGANIZED HEALTH CARE EDUCATION/TRAINING PROGRAM

## 2018-11-22 PROCEDURE — 86900 BLOOD TYPING SEROLOGIC ABO: CPT | Performed by: THORACIC SURGERY (CARDIOTHORACIC VASCULAR SURGERY)

## 2018-11-22 PROCEDURE — 25000128 H RX IP 250 OP 636: Performed by: INTERNAL MEDICINE

## 2018-11-22 PROCEDURE — 83735 ASSAY OF MAGNESIUM: CPT | Performed by: INTERNAL MEDICINE

## 2018-11-22 PROCEDURE — 86901 BLOOD TYPING SEROLOGIC RH(D): CPT | Performed by: THORACIC SURGERY (CARDIOTHORACIC VASCULAR SURGERY)

## 2018-11-22 PROCEDURE — 86850 RBC ANTIBODY SCREEN: CPT | Performed by: THORACIC SURGERY (CARDIOTHORACIC VASCULAR SURGERY)

## 2018-11-22 PROCEDURE — 85610 PROTHROMBIN TIME: CPT | Performed by: INTERNAL MEDICINE

## 2018-11-22 PROCEDURE — 84100 ASSAY OF PHOSPHORUS: CPT | Performed by: INTERNAL MEDICINE

## 2018-11-22 RX ORDER — FUROSEMIDE 20 MG
20 TABLET ORAL 2 TIMES DAILY
Qty: 30 TABLET | Refills: 3 | Status: CANCELLED | OUTPATIENT
Start: 2018-11-22

## 2018-11-22 RX ORDER — MILRINONE LACTATE 0.2 MG/ML
0.12 INJECTION, SOLUTION INTRAVENOUS CONTINUOUS
Qty: 100 ML | Refills: 1 | Status: CANCELLED | OUTPATIENT
Start: 2018-11-22

## 2018-11-22 RX ORDER — WARFARIN SODIUM 2.5 MG/1
2.5 TABLET ORAL
Status: COMPLETED | OUTPATIENT
Start: 2018-11-22 | End: 2018-11-22

## 2018-11-22 RX ORDER — FUROSEMIDE 20 MG
20 TABLET ORAL
Status: DISCONTINUED | OUTPATIENT
Start: 2018-11-22 | End: 2018-11-23 | Stop reason: HOSPADM

## 2018-11-22 RX ORDER — MULTIPLE VITAMINS W/ MINERALS TAB 9MG-400MCG
1 TAB ORAL DAILY
Qty: 30 EACH | Refills: 3 | Status: CANCELLED | OUTPATIENT
Start: 2018-11-23

## 2018-11-22 RX ADMIN — MESALAMINE 2400 MG: 800 TABLET, DELAYED RELEASE ORAL at 19:22

## 2018-11-22 RX ADMIN — FUROSEMIDE 20 MG: 20 TABLET ORAL at 08:18

## 2018-11-22 RX ADMIN — MONTELUKAST SODIUM 10 MG: 10 TABLET, COATED ORAL at 19:22

## 2018-11-22 RX ADMIN — OMEPRAZOLE 40 MG: 20 CAPSULE, DELAYED RELEASE ORAL at 08:12

## 2018-11-22 RX ADMIN — DULOXETINE 20 MG: 20 CAPSULE, DELAYED RELEASE ORAL at 08:12

## 2018-11-22 RX ADMIN — FUROSEMIDE 20 MG: 20 TABLET ORAL at 17:03

## 2018-11-22 RX ADMIN — WARFARIN SODIUM 2.5 MG: 2.5 TABLET ORAL at 19:16

## 2018-11-22 RX ADMIN — SODIUM PHOSPHATE, MONOBASIC, MONOHYDRATE AND SODIUM PHOSPHATE, DIBASIC, ANHYDROUS 20 MMOL: 276; 142 INJECTION, SOLUTION INTRAVENOUS at 05:46

## 2018-11-22 RX ADMIN — MULTIPLE VITAMINS W/ MINERALS TAB 1 TABLET: TAB at 08:12

## 2018-11-22 RX ADMIN — MESALAMINE 2400 MG: 800 TABLET, DELAYED RELEASE ORAL at 08:12

## 2018-11-22 RX ADMIN — Medication 1 MG: at 22:48

## 2018-11-22 RX ADMIN — LEVOTHYROXINE SODIUM 88 MCG: 88 TABLET ORAL at 08:12

## 2018-11-22 RX ADMIN — OMEPRAZOLE 40 MG: 20 CAPSULE, DELAYED RELEASE ORAL at 19:16

## 2018-11-22 RX ADMIN — AZELASTINE HYDROCHLORIDE 2 SPRAY: 137 SPRAY, METERED NASAL at 08:13

## 2018-11-22 RX ADMIN — TAMSULOSIN HYDROCHLORIDE 0.4 MG: 0.4 CAPSULE ORAL at 17:03

## 2018-11-22 RX ADMIN — AMIODARONE HYDROCHLORIDE 200 MG: 200 TABLET ORAL at 08:12

## 2018-11-22 ASSESSMENT — ACTIVITIES OF DAILY LIVING (ADL)
ADLS_ACUITY_SCORE: 11

## 2018-11-22 NOTE — PLAN OF CARE
Problem: Patient Care Overview  Goal: Plan of Care/Patient Progress Review  Goals:  1. Hemodynamically stable  2. Remains euvolemic   Outcome: Improving  Condition improving, ambulating in halls, monitoring sodium levels    Report called to 6C nurse and transferred at 1300

## 2018-11-22 NOTE — PLAN OF CARE
Problem: Patient Care Overview  Goal: Plan of Care/Patient Progress Review  Goals:  1. Hemodynamically stable  2. Remains euvolemic   Pt is alert and oriented x4. Vital signs stable, O2 saturations WDL on room air. Milrinone gtt was kept at 0.125 mcg/kg/min all throughout shift; MAPS remained in the 70s. Urine output WDL, dilia and odorless; pt was provided with fluids, staying within fluid restriction order. Phospohorus was replenished, will continue to monitor.

## 2018-11-23 ENCOUNTER — APPOINTMENT (OUTPATIENT)
Dept: OCCUPATIONAL THERAPY | Facility: CLINIC | Age: 53
DRG: 286 | End: 2018-11-23
Attending: INTERNAL MEDICINE
Payer: MEDICARE

## 2018-11-23 ENCOUNTER — RECORDS - HEALTHEAST (OUTPATIENT)
Dept: ADMINISTRATIVE | Facility: OTHER | Age: 53
End: 2018-11-23

## 2018-11-23 ENCOUNTER — HOME INFUSION (PRE-WILLOW HOME INFUSION) (OUTPATIENT)
Dept: PHARMACY | Facility: CLINIC | Age: 53
End: 2018-11-23

## 2018-11-23 VITALS
HEIGHT: 68 IN | WEIGHT: 129.4 LBS | OXYGEN SATURATION: 99 % | SYSTOLIC BLOOD PRESSURE: 92 MMHG | TEMPERATURE: 97.8 F | BODY MASS INDEX: 19.61 KG/M2 | RESPIRATION RATE: 16 BRPM | DIASTOLIC BLOOD PRESSURE: 53 MMHG | HEART RATE: 79 BPM

## 2018-11-23 DIAGNOSIS — I50.22 CHRONIC SYSTOLIC HEART FAILURE (H): Primary | ICD-10-CM

## 2018-11-23 LAB
ANION GAP SERPL CALCULATED.3IONS-SCNC: 10 MMOL/L (ref 3–14)
BASOPHILS # BLD AUTO: 0.1 10E9/L (ref 0–0.2)
BASOPHILS NFR BLD AUTO: 1.1 %
BUN SERPL-MCNC: 22 MG/DL (ref 7–30)
CALCIUM SERPL-MCNC: 8.5 MG/DL (ref 8.5–10.1)
CHLORIDE SERPL-SCNC: 98 MMOL/L (ref 94–109)
CO2 SERPL-SCNC: 21 MMOL/L (ref 20–32)
CREAT SERPL-MCNC: 0.63 MG/DL (ref 0.66–1.25)
DIFFERENTIAL METHOD BLD: ABNORMAL
EOSINOPHIL # BLD AUTO: 0.5 10E9/L (ref 0–0.7)
EOSINOPHIL NFR BLD AUTO: 6.3 %
ERYTHROCYTE [DISTWIDTH] IN BLOOD BY AUTOMATED COUNT: 19.3 % (ref 10–15)
GFR SERPL CREATININE-BSD FRML MDRD: >90 ML/MIN/1.7M2
GLUCOSE SERPL-MCNC: 72 MG/DL (ref 70–99)
HCT VFR BLD AUTO: 46.8 % (ref 40–53)
HGB BLD-MCNC: 15.6 G/DL (ref 13.3–17.7)
IMM GRANULOCYTES # BLD: 0.1 10E9/L (ref 0–0.4)
IMM GRANULOCYTES NFR BLD: 0.7 %
INR PPP: 1.72 (ref 0.86–1.14)
LYMPHOCYTES # BLD AUTO: 1.6 10E9/L (ref 0.8–5.3)
LYMPHOCYTES NFR BLD AUTO: 22.4 %
MAGNESIUM SERPL-MCNC: 2.2 MG/DL (ref 1.6–2.3)
MCH RBC QN AUTO: 29.1 PG (ref 26.5–33)
MCHC RBC AUTO-ENTMCNC: 33.3 G/DL (ref 31.5–36.5)
MCV RBC AUTO: 87 FL (ref 78–100)
MONOCYTES # BLD AUTO: 0.9 10E9/L (ref 0–1.3)
MONOCYTES NFR BLD AUTO: 12.2 %
NEUTROPHILS # BLD AUTO: 4.1 10E9/L (ref 1.6–8.3)
NEUTROPHILS NFR BLD AUTO: 57.3 %
NRBC # BLD AUTO: 0 10*3/UL
NRBC BLD AUTO-RTO: 0 /100
PLATELET # BLD AUTO: 167 10E9/L (ref 150–450)
POTASSIUM SERPL-SCNC: 3.8 MMOL/L (ref 3.4–5.3)
RBC # BLD AUTO: 5.36 10E12/L (ref 4.4–5.9)
SODIUM SERPL-SCNC: 129 MMOL/L (ref 133–144)
WBC # BLD AUTO: 7.2 10E9/L (ref 4–11)

## 2018-11-23 PROCEDURE — 25000132 ZZH RX MED GY IP 250 OP 250 PS 637: Performed by: INTERNAL MEDICINE

## 2018-11-23 PROCEDURE — 99239 HOSP IP/OBS DSCHRG MGMT >30: CPT | Mod: GC | Performed by: INTERNAL MEDICINE

## 2018-11-23 PROCEDURE — 80048 BASIC METABOLIC PNL TOTAL CA: CPT | Performed by: THORACIC SURGERY (CARDIOTHORACIC VASCULAR SURGERY)

## 2018-11-23 PROCEDURE — A9270 NON-COVERED ITEM OR SERVICE: HCPCS | Performed by: INTERNAL MEDICINE

## 2018-11-23 PROCEDURE — 40000558 ZZH STATISTIC CVC DRESSING CHANGE

## 2018-11-23 PROCEDURE — A9270 NON-COVERED ITEM OR SERVICE: HCPCS | Performed by: STUDENT IN AN ORGANIZED HEALTH CARE EDUCATION/TRAINING PROGRAM

## 2018-11-23 PROCEDURE — 36415 COLL VENOUS BLD VENIPUNCTURE: CPT | Performed by: THORACIC SURGERY (CARDIOTHORACIC VASCULAR SURGERY)

## 2018-11-23 PROCEDURE — 40000133 ZZH STATISTIC OT WARD VISIT: Performed by: OCCUPATIONAL THERAPIST

## 2018-11-23 PROCEDURE — 85025 COMPLETE CBC W/AUTO DIFF WBC: CPT | Performed by: THORACIC SURGERY (CARDIOTHORACIC VASCULAR SURGERY)

## 2018-11-23 PROCEDURE — 25000132 ZZH RX MED GY IP 250 OP 250 PS 637: Performed by: STUDENT IN AN ORGANIZED HEALTH CARE EDUCATION/TRAINING PROGRAM

## 2018-11-23 PROCEDURE — 85610 PROTHROMBIN TIME: CPT | Performed by: THORACIC SURGERY (CARDIOTHORACIC VASCULAR SURGERY)

## 2018-11-23 PROCEDURE — 25000128 H RX IP 250 OP 636: Performed by: INTERNAL MEDICINE

## 2018-11-23 PROCEDURE — 97535 SELF CARE MNGMENT TRAINING: CPT | Mod: GO | Performed by: OCCUPATIONAL THERAPIST

## 2018-11-23 PROCEDURE — 83735 ASSAY OF MAGNESIUM: CPT | Performed by: THORACIC SURGERY (CARDIOTHORACIC VASCULAR SURGERY)

## 2018-11-23 RX ORDER — FUROSEMIDE 20 MG
20 TABLET ORAL 2 TIMES DAILY
Qty: 30 TABLET | Refills: 1 | Status: SHIPPED | OUTPATIENT
Start: 2018-11-23 | End: 2018-12-19

## 2018-11-23 RX ORDER — FUROSEMIDE 20 MG
20 TABLET ORAL 2 TIMES DAILY
Qty: 30 TABLET | Refills: 1 | Status: SHIPPED | OUTPATIENT
Start: 2018-11-23 | End: 2018-11-23

## 2018-11-23 RX ORDER — MILRINONE LACTATE 0.2 MG/ML
0.12 INJECTION, SOLUTION INTRAVENOUS CONTINUOUS
Qty: 100 ML | Refills: 3 | Status: ON HOLD | OUTPATIENT
Start: 2018-11-23 | End: 2019-04-01

## 2018-11-23 RX ADMIN — MULTIPLE VITAMINS W/ MINERALS TAB 1 TABLET: TAB at 09:05

## 2018-11-23 RX ADMIN — FUROSEMIDE 20 MG: 20 TABLET ORAL at 09:05

## 2018-11-23 RX ADMIN — AMIODARONE HYDROCHLORIDE 200 MG: 200 TABLET ORAL at 09:05

## 2018-11-23 RX ADMIN — OMEPRAZOLE 40 MG: 20 CAPSULE, DELAYED RELEASE ORAL at 09:05

## 2018-11-23 RX ADMIN — DULOXETINE 20 MG: 20 CAPSULE, DELAYED RELEASE ORAL at 09:06

## 2018-11-23 RX ADMIN — AZELASTINE HYDROCHLORIDE 2 SPRAY: 137 SPRAY, METERED NASAL at 09:05

## 2018-11-23 RX ADMIN — MESALAMINE 2400 MG: 800 TABLET, DELAYED RELEASE ORAL at 09:06

## 2018-11-23 RX ADMIN — LEVOTHYROXINE SODIUM 88 MCG: 88 TABLET ORAL at 09:05

## 2018-11-23 RX ADMIN — POTASSIUM CHLORIDE 20 MEQ: 750 TABLET, EXTENDED RELEASE ORAL at 00:35

## 2018-11-23 RX ADMIN — MILRINONE LACTATE 0.12 MCG/KG/MIN: 200 INJECTION, SOLUTION INTRAVENOUS at 00:45

## 2018-11-23 ASSESSMENT — ACTIVITIES OF DAILY LIVING (ADL)
ADLS_ACUITY_SCORE: 11

## 2018-11-23 NOTE — DISCHARGE SUMMARY
[facility] Discharge Summary    Everton Larios MRN# [id number]   Age: 53 year old YOB: 1965     Date of Admission:  11/15/2018  Date of Discharge::  11/23/2018  3:45 PM  Admitting Physician:  Jose Alfredo Young MD  Discharge Physician:  Luis Carlos Templeton MD     Home clinic: [facility]          Admission Diagnoses:     Heart failure, chronic, with acute decompensation (H)          Discharge Diagnosis:   Acute on Chronic Decompensated Heart Failure, Stage D NYHA Class III  Ionotrope Dependant   Cardiogenic Shock           Procedures:   Cardiology procedures perfromed:   Right Heart Catherization with Leave in PA-Catheter             Discharge Medications:        Review of your medicines      START taking       Dose / Directions    BETA BLOCKER NOT PRESCRIBED (INTENTIONAL)   Used for:  Chronic systolic congestive heart failure (H)        Beta Blocker not prescribed intentionally due to Recent tx with IV positive inotropic agent   Refills:  0       milrinone 20-5 MG/100ML-% infusion   Commonly known as:  PRIMACOR   Used for:  Chronic systolic congestive heart failure (H)        Dose:  0.125 mcg/kg/min   Inject 6.6375 mcg/min into the vein continuous   Quantity:  100 mL   Refills:  3         CONTINUE these medicines which may have CHANGED, or have new prescriptions. If we are uncertain of the size of tablets/capsules you have at home, strength may be listed as something that might have changed.       Dose / Directions    furosemide 20 MG tablet   Commonly known as:  LASIX   This may have changed:    - medication strength  - how much to take   Used for:  Chronic systolic congestive heart failure (H)        Dose:  20 mg   Take 1 tablet (20 mg) by mouth 2 times daily   Quantity:  30 tablet   Refills:  1       potassium chloride SA 20 MEQ CR tablet   Commonly known as:  K-DUR/KLOR-CON M   This may have changed:    - how much to take  - when to take this   Used for:  Hypokalemia         Dose:  40 mEq   Take 2 tablets (40 mEq) by mouth daily   Quantity:  180 tablet   Refills:  3         CONTINUE these medicines which have NOT CHANGED       Dose / Directions    albuterol 108 (90 Base) MCG/ACT inhaler   Commonly known as:  PROAIR HFA/PROVENTIL HFA/VENTOLIN HFA   Used for:  Intermittent asthma without complication, unspecified asthma severity        Dose:  2 puff   Inhale 2 puffs into the lungs every 6 hours as needed for shortness of breath / dyspnea or wheezing   Refills:  0       amiodarone 200 MG tablet   Commonly known as:  PACERONE/CODARONE   Used for:  Paroxysmal atrial fibrillation (H)        Dose:  200 mg   Take 1 tablet (200 mg) by mouth daily   Refills:  0       ASACOL  MG EC tablet   Used for:  Ulcerative colitis with complication, unspecified location (H)   Generic drug:  mesalamine        Dose:  2400 mg   Take 3 tablets (2,400 mg) by mouth 2 times daily   Refills:  0       azelastine 0.1 % nasal spray   Commonly known as:  ASTELIN   Used for:  Chronic rhinitis        Dose:  2 spray   Spray 2 sprays into both nostrils 2 times daily   Refills:  0       BEANO PO        Dose:  2 tablet   Take 2 tablets by mouth 3 times daily   Refills:  0       DULoxetine 20 MG capsule   Commonly known as:  CYMBALTA        Dose:  20 mg   Take 20 mg by mouth daily   Refills:  0       FLOMAX 0.4 MG capsule   Used for:  Benign prostatic hyperplasia, unspecified whether lower urinary tract symptoms present   Generic drug:  tamsulosin        Dose:  0.4 mg   Take 1 capsule (0.4 mg) by mouth daily (with dinner)   Refills:  0       fluticasone 220 MCG/ACT inhaler   Commonly known as:  FLOVENT HFA        Dose:  2 puff   Inhale 2 puffs into the lungs 2 times daily   Refills:  0       levothyroxine 88 MCG tablet   Commonly known as:  SYNTHROID/LEVOTHROID        Dose:  88 mcg   Take 88 mcg by mouth daily   Refills:  0       omeprazole 40 MG capsule   Commonly known as:  priLOSEC   Used for:  Nausea        Dose:   40 mg   Take 1 capsule (40 mg) by mouth daily In pm   Refills:  0       ondansetron 4 MG ODT tab   Commonly known as:  ZOFRAN-ODT   Used for:  Nausea        Dose:  4 mg   Take 1 tablet (4 mg) by mouth every 8 hours as needed for nausea   Refills:  0       SINGULAIR 10 MG tablet   Used for:  Intermittent asthma without complication, unspecified asthma severity   Generic drug:  montelukast        Dose:  10 mg   Take 1 tablet (10 mg) by mouth At Bedtime   Refills:  0       warfarin 5 MG tablet   Commonly known as:  COUMADIN   Used for:  Paroxysmal atrial fibrillation (H)        Take 2.5 mg on Mon/Wed/Fri and 5 mg on all other days of the week   Refills:  0         STOP taking          carvedilol 3.125 MG tablet   Commonly known as:  COREG           spironolactone 50 MG tablet   Commonly known as:  ALDACTONE                Where to get your medicines      These medications were sent to [facility] [place], [place] - [address]  [address] [address]     Phone:  [phone]      furosemide 20 MG tablet         Some of these will need a paper prescription and others can be bought over the counter. Ask your nurse if you have questions.     Bring a paper prescription for each of these medications      milrinone 20-5 MG/100ML-% infusion       You don't need a prescription for these medications      BETA BLOCKER NOT PRESCRIBED (INTENTIONAL)                          Consultations:     Consultation during this admission received from Social work, Neuropsychiatry, CV Surgery, Dental, Pallitive Care, Interventional Radiology          Brief History of Illness:     Everton Larios is a 53 year old male with history of atrial fibrillation and atrial fibrillation ablation, nonischemic cardiomyopathy (EF 15%), congenital ASD repair in childhood, s/p AICD (2008, generator change 2018), hypothyroidism, and remote history of GI bleed and splenic embolization who presented with several  weeks of worsening fatigued RHC revealed ambulatory cardiogenic shock.           Hospital Course:     52 yo M NICM (as detailed above) admitted with progressive fatigued found to have ambulatory cardiogenic shock during RHC. Due to his progressive symptoms evaluation for heart transplant and LVAD were initiated. Work up is to be completed as an outpatient as patient needs time to confirm compliance and identified reliable care giver which is required for LVAD.     # Ambulatory Cardiogenic Shock  Found on RHC catherization. Initially started on IV afterload reduction and dobutamine. He was then transitioned to milrinone 0.125mcg/kg/min with great improvements in HD. He developed an FRANKLIN shortly after starting milrinone which was thought to be secondary to increase vasodilatation from afterload reducing meds (hydralazine, isordil, and aldactone) in addition to milrinone. After discontinuation of oral vasodilators and aldactone he had marked improvement in renal function. Patient filling pressure also improved drastically with addition to milrinone. Throughout hospital stay patient remained euvolemic despite little diuretic use.     Discharge weight 129Lbs.      # Hyponatremia   Most likely secondary to excess Na wasting in the setting of diuretics and aldactone in the setting of increase renal perfusion. Improved by discharged.     # Hx EtoH Abuse  I had a zoila discussion with the patient regarding prior etoh use and expected cessation. Patient was instructed that he must cease consumption alcohol to be consider a candidate for transplant or LVAD placement. Furthermore he was instructed that he is expected complete random assessments of metabolites to detect etoh use. Patient express understanding of this plan and agreed with proceeding.     Advance Therapy Work Up  Started as inpatient:  Consults  [ x] CVTS  [ x] SW  [x ] Pallitive Care  [ x] Neuropsychiatry  [ x] Dental Eval   [ x] C scope done previously  [ x] CPX  done prior to admission  [ x] 6 MWT  [ x] PFTs done prior to admission    Imaging  CT angiogram heart (pending to be done as outpatient)  [ x] US BRIAN- Normal  [ x] CT head- Moderate small vessel ischemia  [ x]US Lower ex arterial- 1. Right le-49% stenosis at the proximal femoral artery origin.  2. Left le-75% stenosis of the left common and proximal femoral  arteries with 20-49% stenosis at the proximal femoral artery origin.  [ x]US abdomen: 1.  Nonaneurysmal abdominal aorta. 2.  Subcentimeter cyst and subcentimeter echogenic lesion in the inferior right hepatic lobe, likely a hemangioma. 3.Left renal scarring. No hydronephrosis.    Pending: CT chest w/o constrast, US carotid Bilateral       Discharge Instructions and Follow-Up:   Discharge diet: Cardiac   Discharge activity: Activity as tolerated   Discharge follow-up: Follow up in core clinic as scheduled           Discharge Disposition:   Discharged to home          Luis Carlos Templeton MD       I,Corinna Donis MD, have seen and examined this patient on day prior to discharge. Due to logistical issues caused by the Thanksgiving holiday patient's discharge was delayed until the following day.   I have discussed with the team and agree with the findings and discharge plan. I have reviewed the hospital course with the patient and have spent 35 minutes in the process of discharge, including discharge planning with patient education, medication teaching, and the coordination of care to outpatient providers.  It has been a pleasure to participate in the care of your patient - please do not hesitate to contact me with any questions.    Corinna Donis MD  Section Head - Advanced Heart Failure, Transplantation and Mechanical Circulatory Support  Co-Director - Adult Congenital and Cardiovascular Genetics Center  Associate Professor of Medicine, [employer] [facility]

## 2018-11-23 NOTE — PROGRESS NOTES
Cardiology Progress Note    Overnight/subj:   No events overnight   Good uop to 20 of IV lasix    VS reviewed:   Tele: 4 beats of NSVT      Wt: on admit 133, yday 119, today 118  I/O: 1.3 in / 1.7 out yday.     Drips:   Milrinone 0.125     PO Cardiac Meds: Amiodorone 200mg daily, warfarin 5mg daily       Other meds: Azelastine 2sprays BID, Duloxetine 20mg daily, Levothyroxine 88mcg daily, mesalamine 2.4g bid, omeprazole 40mg daily, flomax 0.4mg daily      Radiology Imaging  No new study      Cardiology Studies:  No new study      Labs: Reviewed in epic      Phys exam:  GEN: NAD  Pulm: CTAB  Cardiac: JVP 6cm h20, no murmurs  Vascular: - ESTELLA and +2 pulses  GI: soft, non distended      ASSESSMENT/PLAN:  Everton Larios is a 52yo M pAF s/p ablation, NICM (EF 15%)s/p AICD, ASD s/p repair with persistent left SVC who presented with decompensated heart failure and cardiogenic shock requiring inotropes. Now inotrope dependant.        Hx of EtoH Use  I had a zoila discussion with the patient regarding prior etoh use and expected cessation. Patient was instructed that he must cease consumption alcohol to be consider a candidate for transplant or LVAD placement. Furthermore he was instructed that he is expected complete random assessments of metabolites to detect etoh use. Patient express understanding of this plan and agreed with proceeding.      # NICM (LVEF- 15%) Stage D, Class III   # s/p CRT-D  # p Afib  **Will Need CTA Cardiac for venous anomalous return (likely as outpatient once renal function stabilizes)  - Continue Milrinone at 0.125  - Euvolemic  - Start lasix 20mg BID, as maintenance   - Hold Spironolactone   - continue holding BB   - Continue advance therapy work up, plan for home inotrope    - Patient educated on IV maintenance and milrinone infusion  - Neuropsych eval completed      # Hyponatremia/FRANKLIN-CKD, most likely secondary to vasodilation from milrinone + anti-HTN meds.  Hyponatremia improving but now with  hyperK  -- discontinued hydralazine/isordil   -- re start diuretics  -- Hold Spironolactone   -- continue holding BB   -- cortisol level checked      # UC  - continue home medications    Dispo: Discharge home tomorrow if Na is stable.           Patient discussed with Dr. Gagandeep Templeton MD  Cardiology Fellow  [id number]      Late entry - pt seen and examined on 11/22/18  I have reviewed today's vital signs, notes, medications, labs and imaging. I have also seen and examined the patient and agree with the findings and plan as outlined above.    Corinna Donis MD  Section Head - Advanced Heart Failure, Transplantation and Mechanical Circulatory Support  Co-Director - Adult Congenital and Cardiovascular Genetics Center  Associate Professor of Medicine, [employer] [facility]

## 2018-11-23 NOTE — PROGRESS NOTES
Everton Larios is already an established patient in the CORE/Heart Failure clinic.  Last appt with was Dawna Sutton NP in CORE on 11/15/2018.  I spoke with him/her at the bedside today about his/her discharge plans.  I have scheduled him a Return CORE/heart failure appt with My Norton NP on  at 4:30PM with labs at 4:00.       He/she was instructed on the importance of daily weights, 2 gm sodium diet, 2L fluid restriction and compliance with medications upon hospital discharge.  I instructed him to call with any questions or concerns, including any weight gain or loss of 2 or more pounds in 24 hours or 5 or more pounds in 1 week. I will follow-up with him at the time of appt, sooner if needed.      Dena Segovia RN CHFN  Cardiology Care Coordinator - [facility]   Questions and [phone]

## 2018-11-23 NOTE — PLAN OF CARE
Problem: Patient Care Overview  Goal: Plan of Care/Patient Progress Review  Discharge Planner OT  6C  Patient plan for discharge: home w OP  Current status: Pt currently able to manage ADLs using energy management strategies, plans to hold return to work until able to tolerate a full shift or after sx.  Educated re energy management strategies for IADLs at home including grocery shopping and driving.    Barriers to return to prior living situation: none  Recommendations for discharge: home w OP CR  Rationale for recommendations: pt below his baseline       Entered by: Monique Marsh 11/23/2018 11:13 AM     Pt discharging to home with OP CR today.  Some goals met due to discontinue prior to goals met.

## 2018-11-23 NOTE — CONSULTS
Social Work Services Consult Note:     SW met with pt to introduce self and role in consult.  Pt is interested in healthcare directive/advanced care planning documents.  Pt was given the Honoring Choices Advanced Care Planning Guide, Honoring Choices- Your Rights, Choosing a HealthCare Agent and the ACP Brochure for [facility].  Pt at this time does not does not want to complete a directive, he wants to see if he has a copy at home.  Pt thinks it may have water stains on it and needs to be redone.  Pt was given a blank copy of the long form.  Pt is aware SW is available to assist with completion of the document while hospitalized.  Pt educated about resources at their local clinic and through [employer] (ie. Honoring Choices Classes).  SW to close order as pt is discharging today.  Please re-consult if other needs arise.    LUKE Mcarthur, APSW  6C Unit   Phone: [phone]  Pager: [phone]  Unit: [phone]

## 2018-11-23 NOTE — PLAN OF CARE
"Problem: Patient Care Overview  Goal: Plan of Care/Patient Progress Review  Goals:  1. Hemodynamically stable  2. Remains euvolemic   Outcome: No Change  BP 93/72 (BP Location: Left arm, Cuff Size: Adult Regular)  Pulse 79  Temp 98.4  F (36.9  C) (Oral)  Resp 18  Ht 1.727 m (5' 8\")  Wt 53.8 kg (118 lb 9.7 oz)  SpO2 97%  BMI 18.03 kg/m2    Pt alert with VSS on room air, BP soft, asymptomatic and consistent with chart.   Transferred to 6C to day from , on milrinone, with plan for home infusion for discharge.  LVAD work up has been conducted. Pt states current goal to have as normal a [place] as possible.  Dr. Donis and Cards II providing care for the patient, notify of changes.      "

## 2018-11-23 NOTE — PROGRESS NOTES
This is a recent snapshot of the patient's [facility] medical record.  For current drug dose and complete information and questions, call [phone] or In Basket pool, fv home infusion ([id number])  CSN Number:  [id number]

## 2018-11-23 NOTE — PLAN OF CARE
Problem: Patient Care Overview  Goal: Plan of Care/Patient Progress Review  Patient with planned discharge home with OP CR this day. No acute PT needs.

## 2018-11-23 NOTE — PROGRESS NOTES
Care Coordinator - Discharge Planning    Admission Date/Time:  11/15/2018  Attending MD:  Jose Alfredo Young MD     Data  Date of initial CC assessment: 11/20/2018  Chart reviewed, discussed with interdisciplinary team.   Patient was admitted for:   1. Persistent left SVC (superior vena cava)    2. Chronic systolic congestive heart failure (H)      Assessment   Full assessment completed in previous note   Concerns with insurance coverage for discharge needs: None.  Current Living Situation: Patient lives alone.  Support System: Parents and friends- Supportive and Involved  Services Involved: None- pt was ind. with all cares and mobility prior hospitalization.  Transportation at Discharge: Family or friend will provide  Barriers to Discharge: Medical stability.    Coordination of Care and Referrals: Provided patient/family with options for home infusion.   Per MD, pt medically ready for discharge today on home IV milrinone. Pt has had PLC teaching completed. [facility] liason updated on plan for discharge.     Intervention  Arrangements made with [facility] (Ph: [phone]) for home IV milrinone and PICC line cares/supplies.      Plan  Anticipated Discharge Date: 11/23/2018  Anticipated Discharge Plan: Discharge to home with home infusion    CTS Handoff completed:  YES  Anna Landis RN BSN  6C Unit Care Coordinator  Phone number: [phone]  Pager: [phone]

## 2018-11-23 NOTE — PLAN OF CARE
Problem: Patient Care Overview  Goal: Plan of Care/Patient Progress Review  Goals:  1. Hemodynamically stable  2. Remains euvolemic   Outcome: Improving  Pt admitted 1/15 with decomp HF and cardiogenic shock and initiated inotrope's.  Milrinone gtt continues at 0.125 mcg/kg/min (2 ml/hr) and teaching completed.  VPACE, VS'S on RA and denied pain.  Otherwise, pt slept well and up independently.  Continue to monitor and with POC.

## 2018-11-26 ENCOUNTER — PATIENT OUTREACH (OUTPATIENT)
Dept: CARE COORDINATION | Facility: CLINIC | Age: 53
End: 2018-11-26

## 2018-11-26 ENCOUNTER — HOME INFUSION (PRE-WILLOW HOME INFUSION) (OUTPATIENT)
Dept: PHARMACY | Facility: CLINIC | Age: 53
End: 2018-11-26

## 2018-11-26 ENCOUNTER — CARE COORDINATION (OUTPATIENT)
Dept: CARDIOLOGY | Facility: CLINIC | Age: 53
End: 2018-11-26

## 2018-11-26 DIAGNOSIS — I50.22 CHRONIC SYSTOLIC CONGESTIVE HEART FAILURE (H): Primary | ICD-10-CM

## 2018-11-26 NOTE — PROGRESS NOTES
This is a recent snapshot of the patient's [facility] medical record.  For current drug dose and complete information and questions, call [phone] or In Basket pool, fv home infusion ([id number])  CSN Number:  [id number]      
No

## 2018-11-26 NOTE — CONSULTS
Consult Date:  2018      MMPI-2-RF INTERPRETATION      Ana Rosa Baldwin is a 53-year-old single man hospitalized for exacerbation of nonischemic cardiomyopathy and workup for possible LVAD/heart transplant.  I saw him for neuropsychological evaluation as part of that workup.  Please see my dictation of 2018 for a complete summary of the history and test findings.  He now completes the MMPI-2-RF, a self-report personality measure.      All of the items were answered.  A copious array of symptoms, especially infrequent somatic complaits, were endorsed.  While symptom over-reporting cannot be excluded, this more likely reflects authenic multisystem healt symptoms associated with advanced heart disease.  The clinical profiles are mildly abnormal, reflecting a blend of dysphoria/demoralization and externalizing dysfunction.  He's discouraged and doubtful of his ability to overcome present difficulties.  GI discomfort and to a lesser extent low energy/malaise and headaches are the principal somatic manifestations.  There are no indications of ongoing clinical depression or anxiety.  Other profile features suggest social nonconformity and juvenile conduct problems, which may include rebellious behavior, substance abuse, resistance to authority, etc. But true sociopathic character traits are not evident, nor are there indications of more severe forms of psychopathology, such as psychosis.  Overall, the findings suggest situational angst related to worsening health and a history of behavioral acting out.         ADAM RAYA   Licensed Psychologist            D: 2018   T: 2018   MT: YAEL      Name:     ANA ROSA BALDWIN   MRN:      [id number]        Account:       [id number]   :      1965           Consult Date:  2018      Document: [id number]

## 2018-11-27 ENCOUNTER — DOCUMENTATION ONLY (OUTPATIENT)
Dept: DENTISTRY | Facility: CLINIC | Age: 53
End: 2018-11-27

## 2018-11-27 ENCOUNTER — TELEPHONE (OUTPATIENT)
Dept: CARDIOLOGY | Facility: CLINIC | Age: 53
End: 2018-11-27

## 2018-11-27 NOTE — PROGRESS NOTES
Dental Service Progress Note        Everton Larios MRN# [id number]   YOB: 1965 Age: 53 year old              Chief Complaint:   Patient presents to [facility] dental clinic to be cleared for LVAD treatment. He complained of cold sensitivity mainly on upper left molar area.          History of Present Illness:   This patient is a 53 year old male who presents with to [facility] for dental clearance to get LVAD treatment. He has history of atrial fibrillation and atrial fibrillation ablation, nonischemic cardiomyopathy (EF 15%), congenital ASD repair, and hypothyroidism.            Past Medical History:     Past Medical History:   Diagnosis Date     Pierce's esophagus 10/4/2018     Chronic rhinitis 10/4/2018     Chronic systolic heart failure (H) 10/4/2018     Depression 10/4/2018     DJD (degenerative joint disease) - neck 10/4/2018     H/O congenital atrial septal defect (ASD) repair at age 3 10/4/2018     Hypothyroidism due to medication 10/4/2018     ICD, [employer] scientific 2008; gen change 2/2018 10/4/2018     Intermittent asthma without complication 10/4/2018     Nonischemic cardiomyopathy (H) 10/4/2018     On amiodarone therapy 10/4/2018     Paroxysmal atrial fibrillation (H) 10/4/2018     S/P ablation of atrial fibrillation 10/4/2018     Ulcerative colitis (H) 10/4/2018     Ventricular tachycardia (H) 10/4/2018             Past Surgical History:   No past surgical history on file.            Social History:     Social History   Substance Use Topics     Smoking status: Former Smoker     Years: 10.00     Smokeless tobacco: Never Used     Alcohol use 0.6 oz/week     1 Cans of beer per week      Comment: a couple times a week              Family History:   No family history on file.          Immunizations:     There is no immunization history on file for this patient.          Allergies:     Allergies   Allergen Reactions     Lisinopril Other (See Comments)     hypotension     Codeine  Nausea             Medications:     Current Outpatient Prescriptions Ordered in Epic   Medication     albuterol (PROAIR HFA/PROVENTIL HFA/VENTOLIN HFA) 108 (90 Base) MCG/ACT inhaler     Alpha-D-Galactosidase (BEANO PO)     amiodarone (PACERONE/CODARONE) 200 MG tablet     azelastine (ASTELIN) 0.1 % nasal spray     BETA BLOCKER NOT PRESCRIBED, INTENTIONAL,     DULoxetine (CYMBALTA) 20 MG EC capsule     fluticasone (FLOVENT HFA) 220 MCG/ACT Inhaler     furosemide (LASIX) 20 MG tablet     levothyroxine (SYNTHROID/LEVOTHROID) 88 MCG tablet     mesalamine (ASACOL HD) 800 MG EC tablet     milrinone (PRIMACOR) infusion 200 mcg/mL PREMIX     montelukast (SINGULAIR) 10 MG tablet     omeprazole (PRILOSEC) 40 MG capsule     ondansetron (ZOFRAN-ODT) 4 MG ODT tab     potassium chloride SA (K-DUR/KLOR-CON M) 20 MEQ CR tablet     tamsulosin (FLOMAX) 0.4 MG capsule     warfarin (COUMADIN) 5 MG tablet     No current Epic-ordered facility-administered medications on file.             Review of Systems:   The 10 point review of systems is negative other than noted in the HPI.            Physical Exam:   Head and neck exam:Clicking and popping of the TMJ. Patient wears mouth guard which helps with the jaw pain.    Soft Tissue exam:WNL    Hard Tissue exam:Gross caries upper left 2nd molar #15, primary caries lower right 2nd molar and lower left 1st premolar, calculus and some bone loss         Data:   Radiographic interpretation:Took Pano, BWX and PA radiograph  Osseous pathology:None  Pulpal pathology:None  Periodontal Pathology:Slight bone loss, calculus  Caries:Gross caries #15, primary caries #21 and 31  Odontogenic pathology:None         Assessment and Plan:   Assessment:Patient has gross caries on #15, primary caries #21 and 31, calculus, slight bone loss, deep pockets    Plan:  -Extract #15  -Restore #21 and 31  -Deep cleaning       The patient was seen by and discussed with: The plan is to extract #15, restore #21 and 31, and  deep cleaning. Then the patient will be cleared,  But he will need to come back for re-evaluation and 3 month recall cleaning.      Next Appointment: Extraction #15           Grabiel Villa DDS  PGY 1  Phone: [phone] ext [phone]

## 2018-11-27 NOTE — PROGRESS NOTES
This is a recent snapshot of the patient's [facility] medical record.  For current drug dose and complete information and questions, call [phone] or In Basket pool, [facility] home infusion ([id number])  CSN Number:  [id number]

## 2018-11-27 NOTE — TELEPHONE ENCOUNTER
[facility] Call Center    Phone Message    May a detailed message be left on voicemail: yes    Reason for Call: Other: Pt reported that he wants his cardiology case switched from Dr. Rosendo Beck to Dr. Corinna Donis.  Pt indicated he has his reasons, pt indicated that he has a better rapport with Dr. Donis and that is very important to pt at this point. If need be, pt can be called about this request on his mobile ph#.     Action Taken: Message routed to:  [facility] ([facility]):  Cardiology Clinic

## 2018-11-28 ENCOUNTER — RECORDS - HEALTHEAST (OUTPATIENT)
Dept: ADMINISTRATIVE | Facility: OTHER | Age: 53
End: 2018-11-28

## 2018-11-28 ENCOUNTER — OFFICE VISIT (OUTPATIENT)
Dept: CARDIOLOGY | Facility: CLINIC | Age: 53
End: 2018-11-28
Attending: NURSE PRACTITIONER
Payer: MEDICARE

## 2018-11-28 VITALS
WEIGHT: 140 LBS | SYSTOLIC BLOOD PRESSURE: 99 MMHG | DIASTOLIC BLOOD PRESSURE: 61 MMHG | RESPIRATION RATE: 18 BRPM | OXYGEN SATURATION: 97 % | HEIGHT: 68 IN | HEART RATE: 80 BPM | BODY MASS INDEX: 21.22 KG/M2

## 2018-11-28 DIAGNOSIS — I50.22 CHRONIC SYSTOLIC CONGESTIVE HEART FAILURE (H): ICD-10-CM

## 2018-11-28 DIAGNOSIS — I51.89 DIASTOLIC DYSFUNCTION: ICD-10-CM

## 2018-11-28 DIAGNOSIS — I50.22 CHRONIC SYSTOLIC HEART FAILURE (H): ICD-10-CM

## 2018-11-28 DIAGNOSIS — I48.0 PAROXYSMAL ATRIAL FIBRILLATION (H): Chronic | ICD-10-CM

## 2018-11-28 DIAGNOSIS — I50.22 CHRONIC SYSTOLIC HEART FAILURE (H): Primary | Chronic | ICD-10-CM

## 2018-11-28 DIAGNOSIS — F10.11 ALCOHOL ABUSE, IN REMISSION: ICD-10-CM

## 2018-11-28 LAB
ANION GAP SERPL CALCULATED.3IONS-SCNC: 7 MMOL/L (ref 3–14)
BUN SERPL-MCNC: 13 MG/DL (ref 7–30)
CALCIUM SERPL-MCNC: 8.6 MG/DL (ref 8.5–10.1)
CHLORIDE SERPL-SCNC: 101 MMOL/L (ref 94–109)
CO2 SERPL-SCNC: 26 MMOL/L (ref 20–32)
CREAT SERPL-MCNC: 0.68 MG/DL (ref 0.66–1.25)
GFR SERPL CREATININE-BSD FRML MDRD: >90 ML/MIN/1.7M2
GLUCOSE SERPL-MCNC: 87 MG/DL (ref 70–99)
HGB FREE PLAS-MCNC: <30 MG/DL
POTASSIUM SERPL-SCNC: 3.9 MMOL/L (ref 3.4–5.3)
SODIUM SERPL-SCNC: 134 MMOL/L (ref 133–144)

## 2018-11-28 PROCEDURE — 36415 COLL VENOUS BLD VENIPUNCTURE: CPT | Performed by: NURSE PRACTITIONER

## 2018-11-28 PROCEDURE — 80048 BASIC METABOLIC PNL TOTAL CA: CPT | Performed by: NURSE PRACTITIONER

## 2018-11-28 PROCEDURE — 99214 OFFICE O/P EST MOD 30 MIN: CPT | Performed by: NURSE PRACTITIONER

## 2018-11-28 PROCEDURE — 83051 HEMOGLOBIN PLASMA: CPT | Performed by: NURSE PRACTITIONER

## 2018-11-28 PROCEDURE — G0463 HOSPITAL OUTPT CLINIC VISIT: HCPCS | Mod: ZF

## 2018-11-28 ASSESSMENT — ENCOUNTER SYMPTOMS
MUSCLE CRAMPS: 0
MEMORY LOSS: 0
NIGHT SWEATS: 0
WEAKNESS: 1
HYPOTENSION: 1
BACK PAIN: 1
SLEEP DISTURBANCES DUE TO BREATHING: 0
ALTERED TEMPERATURE REGULATION: 1
MYALGIAS: 1
SEIZURES: 0
FATIGUE: 0
BOWEL INCONTINENCE: 0
TINGLING: 0
HEARTBURN: 1
SPUTUM PRODUCTION: 1
CONSTIPATION: 1
SHORTNESS OF BREATH: 1
DECREASED CONCENTRATION: 1
SNORES LOUDLY: 0
RECTAL PAIN: 0
PANIC: 0
POLYPHAGIA: 0
DIFFICULTY URINATING: 0
FLANK PAIN: 0
ABDOMINAL PAIN: 1
POSTURAL DYSPNEA: 0
POOR WOUND HEALING: 0
POLYDIPSIA: 0
PALPITATIONS: 1
NERVOUS/ANXIOUS: 1
VOMITING: 1
SKIN CHANGES: 0
SYNCOPE: 0
COUGH DISTURBING SLEEP: 0
LOSS OF CONSCIOUSNESS: 0
HEMOPTYSIS: 0
HALLUCINATIONS: 0
HYPERTENSION: 0
JAUNDICE: 0
HEADACHES: 0
DISTURBANCES IN COORDINATION: 0
BLOATING: 1
STIFFNESS: 0
SPEECH CHANGE: 0
TREMORS: 0
LEG PAIN: 1
WEIGHT GAIN: 1
CHILLS: 0
DYSPNEA ON EXERTION: 1
PARALYSIS: 0
DECREASED APPETITE: 0
INCREASED ENERGY: 0
JOINT SWELLING: 0
WHEEZING: 1
ARTHRALGIAS: 0
WEIGHT LOSS: 0
DYSURIA: 0
NAIL CHANGES: 0
LIGHT-HEADEDNESS: 0
INSOMNIA: 1
FEVER: 0
DIZZINESS: 1
MUSCLE WEAKNESS: 1
NAUSEA: 1
EXERCISE INTOLERANCE: 1
NUMBNESS: 0
HEMATURIA: 0
NECK PAIN: 1
COUGH: 1
BLOOD IN STOOL: 0
ORTHOPNEA: 0
DEPRESSION: 1

## 2018-11-28 ASSESSMENT — PAIN SCALES - GENERAL: PAINLEVEL: MILD PAIN (2)

## 2018-11-28 NOTE — PROGRESS NOTES
HPI: Everton is a 53-year-old gentleman with a past medical history of atrial fibrillation s/p atrial fibrillation ablation, nonischemic cardiomyopathy with an EF of 15%, diastolic dysfunction, congenital ASD repair in childhood, s/p AICD placement in 2008 with subsequent generator change in 2018, hypothyroidism, alcohol abuse, remote history of GI bleed and splenic embolization, who was hospitalized for ambulatory cardiogenic shock at [facility] from 11/15 through 11/23/18.    He underwent a right heart cath which showed a mean RA pressure of 12, mean PA pressure of 37, wedge of 28, cardiac output by thermal dilution of 2.4, with an index of 1.4.  Jaden cardiac output of 2.0, with an index of 1.1.  SVR was 18.8.      He was started on IV afterload reduction and dobutamine and later transition to milrinone 0.125 mcg/kg/minute with improvement in his hemodynamics.  Unfortunately he developed an FRANKLIN shortly after starting milrinone secondary to increased vasodilatation from hydralazine, Isordil, and Aldactone.  These medications were discontinued with subsequent marked improvement in renal function.  He returns for a post hospitalization follow-up today.    Since his hospitalization he has been feeling well overall. His weight is up 1 pound to 134.8.  His appetite is good.  He is sleeping on one pillow.  He is following his sodium and fluid restrictions.  He was able to walk to the clinic from the front lobby today without difficulties.  Denies dyspnea on exertion, shortness of breath at rest, PND, orthopnea, chest pain, or palpitations.  He does get lightheaded when changing positions too quickly.  He also gets fatigued if he pushes himself too hard.    PAST MEDICAL HISTORY:  Past Medical History:   Diagnosis Date     Alcohol abuse      Pierce's esophagus 10/4/2018     Chronic rhinitis 10/4/2018     Chronic systolic heart failure (H) 10/4/2018     Depression 10/4/2018     Diastolic dysfunction      DJD (degenerative joint  disease) - neck 10/4/2018     H/O congenital atrial septal defect (ASD) repair at age 3 10/4/2018     Hypothyroidism due to medication 10/4/2018     ICD, [employer] scientific 2008; gen change 2/2018 10/4/2018     Intermittent asthma without complication 10/4/2018     Nonischemic cardiomyopathy (H) 10/4/2018     On amiodarone therapy 10/4/2018     Paroxysmal atrial fibrillation (H) 10/4/2018     S/P ablation of atrial fibrillation 10/4/2018     Systolic heart failure (H)      Ulcerative colitis (H) 10/4/2018     Ventricular tachycardia (H) 10/4/2018       FAMILY HISTORY:  No family history on file.    SOCIAL HISTORY:  Social History     Social History     Marital status: Single     Spouse name: N/A     Number of children: N/A     Years of education: N/A     Social History Main Topics     Smoking status: Former Smoker     Years: 10.00     Smokeless tobacco: Never Used     Alcohol use 0.6 oz/week     1 Cans of beer per week      Comment: a couple times a week      Drug use: No     Sexual activity: Not Asked     Other Topics Concern     None     Social History Narrative       CURRENT MEDICATIONS:  Current Outpatient Prescriptions   Medication Sig Dispense Refill     albuterol (PROAIR HFA/PROVENTIL HFA/VENTOLIN HFA) 108 (90 Base) MCG/ACT inhaler Inhale 2 puffs into the lungs every 6 hours as needed for shortness of breath / dyspnea or wheezing       Alpha-D-Galactosidase (BEANO PO) Take 2 tablets by mouth 3 times daily        amiodarone (PACERONE/CODARONE) 200 MG tablet Take 1 tablet (200 mg) by mouth daily       azelastine (ASTELIN) 0.1 % nasal spray Spray 2 sprays into both nostrils 2 times daily       BETA BLOCKER NOT PRESCRIBED, INTENTIONAL, Beta Blocker not prescribed intentionally due to Recent tx with IV positive inotropic agent  0     DULoxetine (CYMBALTA) 20 MG EC capsule Take 20 mg by mouth daily       fluticasone (FLOVENT HFA) 220 MCG/ACT Inhaler Inhale 2 puffs into the lungs 2 times daily       furosemide  (LASIX) 20 MG tablet Take 1 tablet (20 mg) by mouth 2 times daily 30 tablet 1     levothyroxine (SYNTHROID/LEVOTHROID) 88 MCG tablet Take 88 mcg by mouth daily       mesalamine (ASACOL HD) 800 MG EC tablet Take 3 tablets (2,400 mg) by mouth 2 times daily       milrinone (PRIMACOR) infusion 200 mcg/mL PREMIX Inject 6.6375 mcg/min into the vein continuous 100 mL 3     montelukast (SINGULAIR) 10 MG tablet Take 1 tablet (10 mg) by mouth At Bedtime       omeprazole (PRILOSEC) 40 MG capsule Take 1 capsule (40 mg) by mouth daily In pm       ondansetron (ZOFRAN-ODT) 4 MG ODT tab Take 1 tablet (4 mg) by mouth every 8 hours as needed for nausea       potassium chloride SA (K-DUR/KLOR-CON M) 20 MEQ CR tablet Take 2 tablets (40 mEq) by mouth daily (Patient taking differently: Take 20 mEq by mouth 2 times daily ) 180 tablet 3     tamsulosin (FLOMAX) 0.4 MG capsule Take 1 capsule (0.4 mg) by mouth daily (with dinner)       warfarin (COUMADIN) 5 MG tablet Take 2.5 mg on Mon/Wed/Fri and 5 mg on all other days of the week         ROS:  Answers for HPI/ROS submitted by the patient on 11/28/2018   General Symptoms: Yes  Skin Symptoms: Yes  HENT Symptoms: No  EYE SYMPTOMS: No  HEART SYMPTOMS: Yes  LUNG SYMPTOMS: Yes  INTESTINAL SYMPTOMS: Yes  URINARY SYMPTOMS: Yes  REPRODUCTIVE SYMPTOMS: No  SKELETAL SYMPTOMS: Yes  BLOOD SYMPTOMS: No  NERVOUS SYSTEM SYMPTOMS: Yes  MENTAL HEALTH SYMPTOMS: Yes  Fever: No  Loss of appetite: No  Weight loss: No  Weight gain: Yes  Fatigue: No  Night sweats: No  Chills: No  Increased stress: No  Excessive hunger: No  Excessive thirst: No  Feeling hot or cold when others believe the temperature is normal: Yes  Loss of height: No  Post-operative complications: No  Surgical site pain: No  Hallucinations: No  Change in or Loss of Energy: No  Hyperactivity: No  Confusion: No  Changes in hair: No  Changes in moles/birth marks: No  Itching: Yes  Rashes: No  Changes in nails: No  Acne: No  Change in facial hair:  No  Warts: No  Non-healing sores: No  Scarring: No  Flaking of skin: No  Color changes of hands/feet in cold : Yes  Sun sensitivity: No  Skin thickening: No  Cough: Yes  Sputum or phlegm: Yes  Coughing up blood: No  Difficulty breating or shortness of breath: Yes  Snoring: No  Wheezing: Yes  Difficulty breathing on exertion: Yes  Nighttime Cough: No  Difficulty breathing when lying flat: No  Chest pain or pressure: Yes  Fast or irregular heartbeat: Yes  Pain in legs with walking: Yes  Trouble breathing while lying down: No  Fingers or toes appear blue: Yes  High blood pressure: No  Low blood pressure: Yes  Fainting: No  Murmurs: No  Pacemaker: Yes  Varicose veins: Yes  Wake up at night with shortness of breath: No  Light-headedness: No  Exercise intolerance: Yes  Heart burn or indigestion: Yes  Nausea: Yes  Vomiting: Yes  Abdominal pain: Yes  Bloating: Yes  Constipation: Yes  Blood in stool: No  Black stools: No  Rectal or Anal pain: No  Fecal incontinence: No  Yellowing of skin or eyes: No  Vomit with blood: No  Change in stools: Yes  Trouble holding urine or incontinence: No  Pain or burning: No  Trouble starting or stopping: Yes  Increased frequency of urination: No  Blood in urine: No  Decreased frequency of urination: No  Frequent nighttime urination: No  Flank pain: No  Difficulty emptying bladder: No  Back pain: Yes  Muscle aches: Yes  Neck pain: Yes  Swollen joints: No  Joint pain: No  Bone pain: No  Muscle cramps: No  Muscle weakness: Yes  Joint stiffness: No  Bone fracture: No  Trouble with coordination: No  Dizziness or trouble with balance: Yes  Fainting or black-out spells: No  Memory loss: No  Headache: No  Seizures: No  Speech problems: No  Tingling: No  Tremor: No  Weakness: Yes  Difficulty walking: Yes  Paralysis: No  Numbness: No  Nervous or Anxious: Yes  Depression: Yes  Trouble sleeping: Yes  Trouble thinking or concentrating: Yes  Mood changes: Yes  Panic attacks: No      EXAM:  BP 99/61  Pulse  "80  Resp 18  Ht 1.727 m (5' 8\")  Wt 63.5 kg (140 lb)  SpO2 97%  BMI 21.29 kg/m2  General: alert, articulate, and in no acute distress.  HEENT: normocephalic, atraumatic, anicteric sclera, EOMI, mucosa moist, no cyanosis.   Neck: neck supple.  No adenopathy, masses, or carotid bruits.  JVP not appreciated.   Heart: regular rhythm, normal S1/S2, no murmur, gallop, rub.  Precordium quiet with normal PMI.   2/6 murmur heard most prominent LSB  Lungs: Clear, no rales, ronchi, or wheezing.  No accessory muscle use, respirations unlabored.   Abdomen: Soft, non-tender, bowel sounds present, no hepatomegaly  Extremities: No pitting edema.   No cyanosis.  Extremities Warm.  2+ pedal pulses.   Neurological: Alert and oriented x 3.  Normal speech and affect, no gross motor deficits  Skin:  No ecchymoses, rashes, or clubbing.  PICC site left upper arm.  Old blood noted, with ecchymosis.  Otherwise no signs of infection or erythema.    Labs:  CBC RESULTS:  Lab Results   Component Value Date    WBC 7.2 11/23/2018    RBC 5.36 11/23/2018    HGB 15.6 11/23/2018    HCT 46.8 11/23/2018    MCV 87 11/23/2018    MCH 29.1 11/23/2018    MCHC 33.3 11/23/2018    RDW 19.3 (H) 11/23/2018     11/23/2018       CMP RESULTS:  Lab Results   Component Value Date     11/28/2018    POTASSIUM 3.9 11/28/2018    CHLORIDE 101 11/28/2018    CO2 26 11/28/2018    ANIONGAP 7 11/28/2018    GLC 87 11/28/2018    BUN 13 11/28/2018    CR 0.68 11/28/2018    GFRESTIMATED >90 11/28/2018    GFRESTBLACK >90 11/28/2018    RADAMES 8.6 11/28/2018    BILITOTAL 1.9 (H) 11/02/2018    ALBUMIN 3.2 (L) 11/02/2018    ALKPHOS 180 (H) 11/02/2018    ALT 27 11/02/2018    AST 24 11/02/2018        INR RESULTS:  Lab Results   Component Value Date    INR 1.72 (H) 11/23/2018       No components found for: CK  Lab Results   Component Value Date    MAG 2.2 11/23/2018     Lab Results   Component Value Date    NTBNP 81698 (H) 11/15/2018     @BRIEFLABR (dig)@    Most recent " echocardiogram:  Recent Results (from the past 8760 hour(s))   ECHO COMPLETE WITH OPTISON    Narrative    [id number]  [id number]  [id number]  [id number]^RADHA^KIMBERLY^KEN           [facility]  Echocardiography Laboratory  [address] [place] [id number]     Name: ANA ROSA BALDWIN  MRN: [id number]  : 1965  Study Date: 2018 10:06 AM  Age: 53 yrs  Gender: Male  Patient Location: [facility]  Reason For Study: CHF  History: CHF  Ordering Physician: KIMBERLY GARCIA  Referring Physician: ANNABEL JI  Performed By: Cristel Mcclain RDCS     BSA: 1.7 m2  Height: 68 in  Weight: 133 lb  BP: 100/76 mmHg  _____________________________________________________________________________  __        Procedure  Complete Portable Echo Adult. Contrast Optison. Optison (NDC #4911-2197-42)  given intravenously. Patient was given 6 ml mixture of 3 ml Optison and 6 ml  saline. 3 ml wasted.  _____________________________________________________________________________  __        Interpretation Summary  Severely (EF 10-20%) reduced left ventricular function is present. LVIDd: 6.3  cm. Severe diffuse hypokinesis is present. Biplane EF is 17%. LV apical  hypertrabeculation noted with no evidence of thrombus.  Grade III or advanced diastolic dysfunction.  Global right ventricular function is moderately reduced.  The right ventricle is normal size.There is moderate right ventricular  hypertrophy.  Severe biatrial enlargement is present.  Mild mitral insufficiency is present.  No pericardial effusion is present.  Previous study not available for comparison.     _____________________________________________________________________________  __        Left Ventricle  Left ventricular size is normal. EDV (BP) is normal at 131 ml. Mild eccentric  hypertrophy. Severely (EF 10-20%) reduced left ventricular function is  present. Grade III or advanced diastolic dysfunction. Biplane EF is 17%.  Severe diffuse  hypokinesis is present.     Right Ventricle  The right ventricle is normal size. There is moderate right ventricular  hypertrophy. Global right ventricular function is moderately reduced. A  pacemaker lead is noted in the right ventricle.     Atria  Severe biatrial enlargement is present. The atrial septum is intact as  assessed by color Doppler .     Mitral Valve  Mild mitral annular calcification is present. Mild mitral insufficiency is  present.        Aortic Valve  Aortic valve is normal in structure and function. The aortic valve is  tricuspid.     Tricuspid Valve  Trace tricuspid insufficiency is present. The right ventricular systolic  pressure is approximated at 11.2 mmHg plus the right atrial pressure.     Pulmonic Valve  The pulmonic valve is normal.     Vessels  The aorta root is normal. The inferior vena cava was normal in size with  preserved respiratory variability. The pulmonary artery and bifurcation cannot  be assessed. Estimated mean right atrial pressure is 3 mmHg (normal).     Pericardium  No pericardial effusion is present.        Compared to Previous Study  Previous study not available for comparison.  _____________________________________________________________________________  __  MMode/2D Measurements & Calculations     IVSd: 0.84 cm  LVIDd: 6.3 cm  LVIDs: 5.5 cm  LVPWd: 0.82 cm  FS: 12.9 %  LV mass(C)d: 215.4 grams  LV mass(C)dI: 125.3 grams/m2  MV Diam: 3.6 cm  Ao root diam: 3.1 cm  LA dimension: 5.7 cm  asc Aorta Diam: 3.1 cm  LA/Ao: 1.8  LVOT diam: 2.4 cm  LVOT area: 4.5 cm2  LA Volume (BP): 210.0 ml  LA Volume Index (BP): 122.1 ml/m2     RWT: 0.26        Doppler Measurements & Calculations  MV E max sandro: 105.0 cm/sec  MV Flow area(1diam): 10.2 cm2  Ao V2 max: 68.4 cm/sec  Ao max P.0 mmHg  Ao V2 mean: 54.2 cm/sec  Ao mean P.0 mmHg  Ao V2 VTI: 12.0 cm  ALONZO(I,D): 2.6 cm2  ALONZO(V,D): 3.0 cm2  LV V1 max P.83 mmHg  LV V1 max: 45.5 cm/sec  LV V1 VTI: 6.8 cm  SV(LVOT): 30.7  ml  SI(LVOT): 17.8 ml/m2  PA acc time: 0.07 sec  TR max ata: 164.0 cm/sec  TR max P.2 mmHg  AV Ata Ratio (DI): 0.67  ALONZO Index (cm2/m2): 1.5        _____________________________________________________________________________  __        Report approved by: Kishor THOMPSON 2018 12:40 PM          Assessment and Plan:    In summary, Everton is a 53-year-old gentleman with nonischemic cardiomyopathy, currently on milrinone, who is being considered for advanced therapies.  He appears euvolemic today.  I did not make any medication adjustments today as his blood pressure is low.  He will return to clinic in 2 weeks.      I am hoping to advance his heart failure regimen further at that time.  He would benefit from cardiac rehab as well.  I have sent a referral request to Dr. Donis to sign.  He would like to start cardiac rehab at [facility] as it is closer to his home.    1.  Chronic combined systolic and diastolic heart failure secondary to NICM.  Stage D  NYHA Class III  ACEi/ARB: no, deferred due to hypotension.  Will re-evaluate at office visit in 2 weeks.  BB: Deferred as he is on inotropes.  Aldosterone antagonist: Deferred due to hyponatremia.  SCD prophylaxis: CRT-D  BiV Pacing:  AP =60%.   = 59%.  BVP = 51%  Fluid status: Euvolemic  Anticoagulation: Warfarin with INR goal of 2-3.  Antiplatelet:  ASA dose   Sleep apnea:  Not discussed at this visit.  Will evaluate next visit.  NSAID use:  Contraindicated.  Avoid use.  Remote Monitoring: None.     2.  Atrial Fibrillation:  Chads Vasc score:  1.  Continue warfarin with an INR goal of 2-3 per primary cardiologist recommendations.  Continue amiodarone with routine amiodarone screening per protocol.    3.  Alcohol abuse:  Currently abstinent.  Continue periodic random drug screens at subsequent visits. Ordered for next visit.     3.  Follow-up:  [facility] in two weeks. Referral request sent to Dr. Donis for Cardiac Rehab order.       >40 minutes  spent face to face with patient with >50% in counseling, education, and coordination of care      My LUJAN, ESTRELLITA  [facility]

## 2018-11-28 NOTE — MR AVS SNAPSHOT
After Visit Summary   2018    Ana Rosa Larios    MRN: [id number]           Patient Information     Date Of Birth          1965        Visit Information        Provider Department      2018 4:30 PM My Norton NP [facility]        Today's Diagnoses     Chronic systolic heart failure (H)    -  1      Care Instructions    You were seen today in the Cardiovascular Clinic at the [facility].     Cardiology Providers you saw during your visit: My Norton APRN, CNP      Follow up and medication changes:    1. Return to clinic in 2 weeks.   2. We will place an order for Cardiac Rehab.   3. Schedule follow up with Dr Donis.       Results for ANA ROSA LARIOS (MRN [id number]) as of 2018 17:09   Ref. Range 2018 16:25   Sodium Latest Ref Range: 133 - 144 mmol/L 134   Potassium Latest Ref Range: 3.4 - 5.3 mmol/L 3.9   Chloride Latest Ref Range: 94 - 109 mmol/L 101   Carbon Dioxide Latest Ref Range: 20 - 32 mmol/L 26   Urea Nitrogen Latest Ref Range: 7 - 30 mg/dL 13   Creatinine Latest Ref Range: 0.66 - 1.25 mg/dL 0.68   GFR Estimate Latest Ref Range: >60 mL/min/1.7m2 >90   GFR Estimate If Black Latest Ref Range: >60 mL/min/1.7m2 >90   Calcium Latest Ref Range: 8.5 - 10.1 mg/dL 8.6   Anion Gap Latest Ref Range: 3 - 14 mmol/L 7   Hemoglobin Plasma Latest Ref Range: <30 mg/dL <30   Glucose Latest Ref Range: 70 - 99 mg/dL 87         Please limit your fluid intake to 2 L (68 ounces) daily.  2 Liters a day = 8.5 cups, or 68 ounces.  Please limit your salt intake to 2 grams a day or less.     If you gain 2# in 24 hours or 5# in one week call Dena Segovia RN so we can adjust your medications as needed over the phone.     Please feel free to call me with any questions or concerns.       Dena Segovia RN [facility]  [facility]  Cardiology Care Coordinator-Heart Failure Clinic     Questions and [phone].   First  "press #1 for the University and then press #3 for \"Medical Questions\" to reach us Cardiology Nurses.      On Call Cardiologist for after hours or on weekends: [phone]   option #4 and ask to speak to the on-call Cardiologist. Inform them you are a CORE/heart failure patient at the [facility].           If you need a medication refill please contact your pharmacy.  Please allow 3 business days for your refill to be completed.  _______________________________________________________  C.O.R.E. CLINIC Cardiomyopathy, Optimization, Rehabilitation, Education   The C.O.R.E. CLINIC is a heart failure specialty clinic within the [facility] where you will work with specialized nurse practitioners dedicated to helping patients with heart failure carefully adjust medications, receive education, and learn who and when to call if symptoms develop. They specialize in helping you better understand your condition, slow the progression of your disease, improve the length and quality of your life, help you detect future heart problems before they become life threatening, and avoid hospitalizations.  As always, thank you for trusting us with your health care needs!             Follow-ups after your visit        Additional Services     Follow-Up with CORE Clinic           Follow-Up with Advanced Heart Failure Cardiologist                 Your next 10 appointments already scheduled     Dec 19, 2018  3:30 PM CST   Lab with  LAB   [facility] ([facility])    [address]  1st Floor  [place] [id number] [phone]            Dec 19, 2018  4:00 PM CST   (Arrive by 3:45 PM)   CORE RETURN with My Norton NP   [facility] ([facility])    [address]  [address]  [place] [id number] [phone]            Mar 13, 2019  4:00 PM CDT   Lab with UC LAB   [facility] ([facility])    " [address]  1st Floor  [place] [address]   [phone]            Mar 13, 2019  4:30 PM CDT   (Arrive by 4:15 PM)   RETURN HEART FAILURE with Corinna Donis MD   [facility] ([facility])    [address]  [address]  [place] [id number] [phone]              Future tests that were ordered for you today     Open Future Orders        Priority Expected Expires Ordered    Follow-Up with CORE Clinic Routine 12/12/2018 3/5/2019 11/28/2018    Follow-Up with Advanced Heart Failure Cardiologist Routine 12/28/2018 11/28/2019 11/28/2018            Who to contact     If you have questions or need follow up information about today's clinic visit or your schedule please contact [facility] directly at [phone].  Normal or non-critical lab and imaging results will be communicated to you by MyChart, letter or phone within 4 business days after the clinic has received the results. If you do not hear from us within 7 days, please contact the clinic through [employer] or phone. If you have a critical or abnormal lab result, we will notify you by phone as soon as possible.  Submit refill requests through [employer] or call your pharmacy and they will forward the refill request to us. Please allow 3 business days for your refill to be completed.          Additional Information About Your Visit        MyChart Information     [employer] gives you secure access to your electronic health record. If you see a primary care provider, you can also send messages to your care team and make appointments. If you have questions, please call your primary care clinic.  If you do not have a primary care provider, please call [phone] and they will assist you.        Care EveryWhere ID     This is your Care EveryWhere ID. This could be used by other organizations to access your [place] medical records  [id number]        Your Vitals Were     Pulse Respirations Height  "Pulse Oximetry BMI (Body Mass Index)       80 18 1.727 m (5' 8\") 97% 21.29 kg/m2        Blood Pressure from Last 3 Encounters:   11/28/18 99/61   11/23/18 92/53   11/15/18 95/70    Weight from Last 3 Encounters:   11/28/18 63.5 kg (140 lb)   11/23/18 58.7 kg (129 lb 6.4 oz)   11/15/18 60.7 kg (133 lb 14.4 oz)                 Today's Medication Changes          These changes are accurate as of 11/28/18  5:36 PM.  If you have any questions, ask your nurse or doctor.               These medicines have changed or have updated prescriptions.        Dose/Directions    potassium chloride ER 20 MEQ CR tablet   Commonly known as:  K-DUR/KLOR-CON M   This may have changed:    - how much to take  - when to take this   Used for:  Hypokalemia        Dose:  40 mEq   Take 2 tablets (40 mEq) by mouth daily   Quantity:  180 tablet   Refills:  3                Primary Care Provider Office Phone # Fax #    Fredrick J [facility] [phone]       [place] [address]        Equal Access to Services     BELIA GOLDBERG AH: Hadii aaron hidalgo hadconyo Soparvez, waaxda luqadaha, qaybta kaalmada adeegyada, dean topete. So [facility] [phone].    ATENCIÓN: Si habla español, tiene a anaya disposición servicios gratuitos de asistencia lingüística. Jad al [phone].    We comply with applicable federal civil rights laws and [place] laws. We do not discriminate on the basis of race, color, national origin, age, disability, sex, sexual orientation, or gender identity.            Thank you!     Thank you for choosing [facility]  for your care. Our goal is always to provide you with excellent care. Hearing back from our patients is one way we can continue to improve our services. Please take a few minutes to complete the written survey that you may receive in the mail after your visit with us. Thank you!             Your Updated Medication List - Protect others around you: " Learn how to safely use, store and throw away your medicines at www.disposemymeds.org.          This list is accurate as of 11/28/18  5:36 PM.  Always use your most recent med list.                   Brand Name Dispense Instructions for use Diagnosis    albuterol 108 (90 Base) MCG/ACT inhaler    PROAIR HFA/PROVENTIL HFA/VENTOLIN HFA     Inhale 2 puffs into the lungs every 6 hours as needed for shortness of breath / dyspnea or wheezing    Intermittent asthma without complication, unspecified asthma severity       amiodarone 200 MG tablet    PACERONE/CODARONE     Take 1 tablet (200 mg) by mouth daily    Paroxysmal atrial fibrillation (H)       ASACOL  MG EC tablet   Generic drug:  mesalamine      Take 3 tablets (2,400 mg) by mouth 2 times daily    Ulcerative colitis with complication, unspecified location (H)       azelastine 0.1 % nasal spray    ASTELIN     Spray 2 sprays into both nostrils 2 times daily    Chronic rhinitis       BEANO PO      Take 2 tablets by mouth 3 times daily        BETA BLOCKER NOT PRESCRIBED    INTENTIONAL     Beta Blocker not prescribed intentionally due to Recent tx with IV positive inotropic agent    Chronic systolic congestive heart failure (H)       DULoxetine 20 MG capsule    CYMBALTA     Take 20 mg by mouth daily        FLOMAX 0.4 MG capsule   Generic drug:  tamsulosin      Take 1 capsule (0.4 mg) by mouth daily (with dinner)    Benign prostatic hyperplasia, unspecified whether lower urinary tract symptoms present       fluticasone 220 MCG/ACT inhaler    FLOVENT HFA     Inhale 2 puffs into the lungs 2 times daily        furosemide 20 MG tablet    LASIX    30 tablet    Take 1 tablet (20 mg) by mouth 2 times daily    Chronic systolic congestive heart failure (H)       levothyroxine 88 MCG tablet    SYNTHROID/LEVOTHROID     Take 88 mcg by mouth daily        milrinone 20-5 MG/100ML-% infusion    PRIMACOR    100 mL    Inject 6.6375 mcg/min into the vein continuous    Chronic systolic  congestive heart failure (H)       omeprazole 40 MG DR capsule    priLOSEC     Take 1 capsule (40 mg) by mouth daily In pm    Nausea       ondansetron 4 MG ODT tab    ZOFRAN-ODT     Take 1 tablet (4 mg) by mouth every 8 hours as needed for nausea    Nausea       potassium chloride ER 20 MEQ CR tablet    K-DUR/KLOR-CON M    180 tablet    Take 2 tablets (40 mEq) by mouth daily    Hypokalemia       SINGULAIR 10 MG tablet   Generic drug:  montelukast      Take 1 tablet (10 mg) by mouth At Bedtime    Intermittent asthma without complication, unspecified asthma severity       warfarin 5 MG tablet    COUMADIN     Take 2.5 mg on Mon/Wed/Fri and 5 mg on all other days of the week    Paroxysmal atrial fibrillation (H)

## 2018-11-28 NOTE — NURSING NOTE
Diet: Patient instructed regarding a heart healthy diet, including discussion of reduced fat and sodium intake. Patient demonstrated understanding of this information and agreed to call with further questions or concerns.  Labs: Patient was given results of the laboratory testing obtained today. Patient was instructed to return for the next laboratory testing in 2 weeks with clinic visit. Patient demonstrated understanding of this information and agreed to call with further questions or concerns.   Return Appointment: Patient given instructions regarding scheduling next clinic visit. Patient demonstrated understanding of this information and agreed to call with further questions or concerns.  Medication Change: Patient was educated regarding prescribed medication change, including discussion of the indication, administration, side effects, and when to report to MD or RN. Patient demonstrated understanding of this information and agreed to call with further questions or concerns.  Patient stated he understood all health information given and agreed to call with further questions or concerns.   Dena Segovia RN

## 2018-11-28 NOTE — PATIENT INSTRUCTIONS
"You were seen today in the Cardiovascular Clinic at the [facility].     Cardiology Providers you saw during your visit: My LUJAN CNP      Follow up and medication changes:    1. Return to clinic in 2 weeks.   2. We will place an order for Cardiac Rehab.   3. Schedule follow up with Dr Donis.       Results for ANA ROSA BALDWIN (MRN [id number]) as of 2018 17:09   Ref. Range 2018 16:25   Sodium Latest Ref Range: 133 - 144 mmol/L 134   Potassium Latest Ref Range: 3.4 - 5.3 mmol/L 3.9   Chloride Latest Ref Range: 94 - 109 mmol/L 101   Carbon Dioxide Latest Ref Range: 20 - 32 mmol/L 26   Urea Nitrogen Latest Ref Range: 7 - 30 mg/dL 13   Creatinine Latest Ref Range: 0.66 - 1.25 mg/dL 0.68   GFR Estimate Latest Ref Range: >60 mL/min/1.7m2 >90   GFR Estimate If Black Latest Ref Range: >60 mL/min/1.7m2 >90   Calcium Latest Ref Range: 8.5 - 10.1 mg/dL 8.6   Anion Gap Latest Ref Range: 3 - 14 mmol/L 7   Hemoglobin Plasma Latest Ref Range: <30 mg/dL <30   Glucose Latest Ref Range: 70 - 99 mg/dL 87         Please limit your fluid intake to 2 L (68 ounces) daily.  2 Liters a day = 8.5 cups, or 68 ounces.  Please limit your salt intake to 2 grams a day or less.     If you gain 2# in 24 hours or 5# in one week call Dena Segovia RN so we can adjust your medications as needed over the phone.     Please feel free to call me with any questions or concerns.       Dena Segovia RN CHFN  [facility]  Cardiology Care Coordinator-Heart Failure Clinic     Questions and [phone].   First press #1 for the [employer] and then press #3 for \"Medical Questions\" to reach us Cardiology Nurses.      On Call Cardiologist for after hours or on weekends: [phone]   option #4 and ask to speak to the on-call Cardiologist. Inform them you are a CORE/heart failure patient at the [facility].           If you need a medication refill please contact your pharmacy.  " Please allow 3 business days for your refill to be completed.  _______________________________________________________  C.O.R.E. CLINIC Cardiomyopathy, Optimization, Rehabilitation, Education   The C.O.R.E. CLINIC is a heart failure specialty clinic within the [facility] where you will work with specialized nurse practitioners dedicated to helping patients with heart failure carefully adjust medications, receive education, and learn who and when to call if symptoms develop. They specialize in helping you better understand your condition, slow the progression of your disease, improve the length and quality of your life, help you detect future heart problems before they become life threatening, and avoid hospitalizations.  As always, thank you for trusting us with your health care needs!

## 2018-11-28 NOTE — LETTER
11/28/2018      RE: Everton Larios  [address] [facility] [address]       Dear Colleague,    Thank you for the opportunity to participate in the care of your patient, Everton Larios, at the [facility] HEART [facility]. Please see a copy of my visit note below.      HPI: Everton is a 53-year-old gentleman with a past medical history of atrial fibrillation s/p atrial fibrillation ablation, nonischemic cardiomyopathy with an EF of 15%, diastolic dysfunction, congenital ASD repair in childhood, s/p AICD placement in 2008 with subsequent generator change in 2018, hypothyroidism, alcohol abuse, remote history of GI bleed and splenic embolization, who was hospitalized for ambulatory cardiogenic shock at [facility] from 11/15 through 11/23/18.    He underwent a right heart cath which showed a mean RA pressure of 12, mean PA pressure of 37, wedge of 28, cardiac output by thermal dilution of 2.4, with an index of 1.4.  Jaden cardiac output of 2.0, with an index of 1.1.  SVR was 18.8.      He was started on IV afterload reduction and dobutamine and later transition to milrinone 0.125 mcg/kg/minute with improvement in his hemodynamics.  Unfortunately he developed an FRANKLIN shortly after starting milrinone secondary to increased vasodilatation from hydralazine, Isordil, and Aldactone.  These medications were discontinued with subsequent marked improvement in renal function.  He returns for a post hospitalization follow-up today.    Since his hospitalization he has been feeling well overall. His weight is up 1 pound to 134.8.  His appetite is good.  He is sleeping on one pillow.  He is following his sodium and fluid restrictions.  He was able to walk to the clinic from the front lobby today without difficulties.  Denies dyspnea on exertion, shortness of breath at rest, PND, orthopnea, chest pain, or palpitations.  He does get lightheaded when changing positions too quickly.  He also  gets fatigued if he pushes himself too hard.    PAST MEDICAL HISTORY:  Past Medical History:   Diagnosis Date     Alcohol abuse      Pierce's esophagus 10/4/2018     Chronic rhinitis 10/4/2018     Chronic systolic heart failure (H) 10/4/2018     Depression 10/4/2018     Diastolic dysfunction      DJD (degenerative joint disease) - neck 10/4/2018     H/O congenital atrial septal defect (ASD) repair at age 3 10/4/2018     Hypothyroidism due to medication 10/4/2018     ICD, Nefsis scientific 2008; gen change 2/2018 10/4/2018     Intermittent asthma without complication 10/4/2018     Nonischemic cardiomyopathy (H) 10/4/2018     On amiodarone therapy 10/4/2018     Paroxysmal atrial fibrillation (H) 10/4/2018     S/P ablation of atrial fibrillation 10/4/2018     Systolic heart failure (H)      Ulcerative colitis (H) 10/4/2018     Ventricular tachycardia (H) 10/4/2018       FAMILY HISTORY:  No family history on file.    SOCIAL HISTORY:  Social History     Social History     Marital status: Single     Spouse name: N/A     Number of children: N/A     Years of education: N/A     Social History Main Topics     Smoking status: Former Smoker     Years: 10.00     Smokeless tobacco: Never Used     Alcohol use 0.6 oz/week     1 Cans of beer per week      Comment: a couple times a week      Drug use: No     Sexual activity: Not Asked     Other Topics Concern     None     Social History Narrative       CURRENT MEDICATIONS:  Current Outpatient Prescriptions   Medication Sig Dispense Refill     albuterol (PROAIR HFA/PROVENTIL HFA/VENTOLIN HFA) 108 (90 Base) MCG/ACT inhaler Inhale 2 puffs into the lungs every 6 hours as needed for shortness of breath / dyspnea or wheezing       Alpha-D-Galactosidase (BEANO PO) Take 2 tablets by mouth 3 times daily        amiodarone (PACERONE/CODARONE) 200 MG tablet Take 1 tablet (200 mg) by mouth daily       azelastine (ASTELIN) 0.1 % nasal spray Spray 2 sprays into both nostrils 2 times daily        "BETA BLOCKER NOT PRESCRIBED, INTENTIONAL, Beta Blocker not prescribed intentionally due to Recent tx with IV positive inotropic agent  0     DULoxetine (CYMBALTA) 20 MG EC capsule Take 20 mg by mouth daily       fluticasone (FLOVENT HFA) 220 MCG/ACT Inhaler Inhale 2 puffs into the lungs 2 times daily       furosemide (LASIX) 20 MG tablet Take 1 tablet (20 mg) by mouth 2 times daily 30 tablet 1     levothyroxine (SYNTHROID/LEVOTHROID) 88 MCG tablet Take 88 mcg by mouth daily       mesalamine (ASACOL HD) 800 MG EC tablet Take 3 tablets (2,400 mg) by mouth 2 times daily       milrinone (PRIMACOR) infusion 200 mcg/mL PREMIX Inject 6.6375 mcg/min into the vein continuous 100 mL 3     montelukast (SINGULAIR) 10 MG tablet Take 1 tablet (10 mg) by mouth At Bedtime       omeprazole (PRILOSEC) 40 MG capsule Take 1 capsule (40 mg) by mouth daily In pm       ondansetron (ZOFRAN-ODT) 4 MG ODT tab Take 1 tablet (4 mg) by mouth every 8 hours as needed for nausea       potassium chloride SA (K-DUR/KLOR-CON M) 20 MEQ CR tablet Take 2 tablets (40 mEq) by mouth daily (Patient taking differently: Take 20 mEq by mouth 2 times daily ) 180 tablet 3     tamsulosin (FLOMAX) 0.4 MG capsule Take 1 capsule (0.4 mg) by mouth daily (with dinner)       warfarin (COUMADIN) 5 MG tablet Take 2.5 mg on Mon/Wed/Fri and 5 mg on all other days of the week       EXAM:  BP 99/61  Pulse 80  Resp 18  Ht 1.727 m (5' 8\")  Wt 63.5 kg (140 lb)  SpO2 97%  BMI 21.29 kg/m2  General: alert, articulate, and in no acute distress.  HEENT: normocephalic, atraumatic, anicteric sclera, EOMI, mucosa moist, no cyanosis.   Neck: neck supple.  No adenopathy, masses, or carotid bruits.  JVP not appreciated.   Heart: regular rhythm, normal S1/S2, no murmur, gallop, rub.  Precordium quiet with normal PMI.   2/6 murmur heard most prominent LSB  Lungs: Clear, no rales, ronchi, or wheezing.  No accessory muscle use, respirations unlabored.   Abdomen: Soft, non-tender, bowel " sounds present, no hepatomegaly  Extremities: No pitting edema.   No cyanosis.  Extremities Warm.  2+ pedal pulses.   Neurological: Alert and oriented x 3.  Normal speech and affect, no gross motor deficits  Skin:  No ecchymoses, rashes, or clubbing.  PICC site left upper arm.  Old blood noted, with ecchymosis.  Otherwise no signs of infection or erythema.    Labs:  CBC RESULTS:  Lab Results   Component Value Date    WBC 7.2 2018    RBC 5.36 2018    HGB 15.6 2018    HCT 46.8 2018    MCV 87 2018    MCH 29.1 2018    MCHC 33.3 2018    RDW 19.3 (H) 2018     2018       CMP RESULTS:  Lab Results   Component Value Date     2018    POTASSIUM 3.9 2018    CHLORIDE 101 2018    CO2 26 2018    ANIONGAP 7 2018    GLC 87 2018    BUN 13 2018    CR 0.68 2018    GFRESTIMATED >90 2018    GFRESTBLACK >90 2018    RADAMES 8.6 2018    BILITOTAL 1.9 (H) 2018    ALBUMIN 3.2 (L) 2018    ALKPHOS 180 (H) 2018    ALT 27 2018    AST 24 2018        INR RESULTS:  Lab Results   Component Value Date    INR 1.72 (H) 2018       No components found for: CK  Lab Results   Component Value Date    MAG 2.2 2018     Lab Results   Component Value Date    NTBNP 51669 (H) 11/15/2018     @BRIEFLABR (dig)@    Most recent echocardiogram:  Recent Results (from the past 8760 hour(s))   ECHO COMPLETE WITH OPTISON    Narrative    [id number]  [id number]  [id number]  [id number]^RADHA^KIMBERLY^KEN           [facility]  Echocardiography Laboratory  [address] [place] [id number]     Name: ANA ROSA BALDWIN  MRN: [id number]  : 1965  Study Date: 2018 10:06 AM  Age: 53 yrs  Gender: Male  Patient Location: [facility]  Reason For Study: CHF  History: CHF  Ordering Physician: KIMBERLY GARCIA  Referring Physician: ANNABEL JI  Performed By: Cristel Mcclain,  RDCS     BSA: 1.7 m2  Height: 68 in  Weight: 133 lb  BP: 100/76 mmHg  _____________________________________________________________________________  __        Procedure  Complete Portable Echo Adult. Contrast Optison. Optison (NDC #9610-4319-74)  given intravenously. Patient was given 6 ml mixture of 3 ml Optison and 6 ml  saline. 3 ml wasted.  _____________________________________________________________________________  __        Interpretation Summary  Severely (EF 10-20%) reduced left ventricular function is present. LVIDd: 6.3  cm. Severe diffuse hypokinesis is present. Biplane EF is 17%. LV apical  hypertrabeculation noted with no evidence of thrombus.  Grade III or advanced diastolic dysfunction.  Global right ventricular function is moderately reduced.  The right ventricle is normal size.There is moderate right ventricular  hypertrophy.  Severe biatrial enlargement is present.  Mild mitral insufficiency is present.  No pericardial effusion is present.  Previous study not available for comparison.     _____________________________________________________________________________  __        Left Ventricle  Left ventricular size is normal. EDV (BP) is normal at 131 ml. Mild eccentric  hypertrophy. Severely (EF 10-20%) reduced left ventricular function is  present. Grade III or advanced diastolic dysfunction. Biplane EF is 17%.  Severe diffuse hypokinesis is present.     Right Ventricle  The right ventricle is normal size. There is moderate right ventricular  hypertrophy. Global right ventricular function is moderately reduced. A  pacemaker lead is noted in the right ventricle.     Atria  Severe biatrial enlargement is present. The atrial septum is intact as  assessed by color Doppler .     Mitral Valve  Mild mitral annular calcification is present. Mild mitral insufficiency is  present.        Aortic Valve  Aortic valve is normal in structure and function. The aortic valve is  tricuspid.     Tricuspid  Valve  Trace tricuspid insufficiency is present. The right ventricular systolic  pressure is approximated at 11.2 mmHg plus the right atrial pressure.     Pulmonic Valve  The pulmonic valve is normal.     Vessels  The aorta root is normal. The inferior vena cava was normal in size with  preserved respiratory variability. The pulmonary artery and bifurcation cannot  be assessed. Estimated mean right atrial pressure is 3 mmHg (normal).     Pericardium  No pericardial effusion is present.        Compared to Previous Study  Previous study not available for comparison.  _____________________________________________________________________________  __  MMode/2D Measurements & Calculations     IVSd: 0.84 cm  LVIDd: 6.3 cm  LVIDs: 5.5 cm  LVPWd: 0.82 cm  FS: 12.9 %  LV mass(C)d: 215.4 grams  LV mass(C)dI: 125.3 grams/m2  MV Diam: 3.6 cm  Ao root diam: 3.1 cm  LA dimension: 5.7 cm  asc Aorta Diam: 3.1 cm  LA/Ao: 1.8  LVOT diam: 2.4 cm  LVOT area: 4.5 cm2  LA Volume (BP): 210.0 ml  LA Volume Index (BP): 122.1 ml/m2     RWT: 0.26        Doppler Measurements & Calculations  MV E max ata: 105.0 cm/sec  MV Flow area(1diam): 10.2 cm2  Ao V2 max: 68.4 cm/sec  Ao max P.0 mmHg  Ao V2 mean: 54.2 cm/sec  Ao mean P.0 mmHg  Ao V2 VTI: 12.0 cm  ALONZO(I,D): 2.6 cm2  ALONZO(V,D): 3.0 cm2  LV V1 max P.83 mmHg  LV V1 max: 45.5 cm/sec  LV V1 VTI: 6.8 cm  SV(LVOT): 30.7 ml  SI(LVOT): 17.8 ml/m2  PA acc time: 0.07 sec  TR max ata: 164.0 cm/sec  TR max P.2 mmHg  AV Ata Ratio (DI): 0.67  ALONZO Index (cm2/m2): 1.5        _____________________________________________________________________________  __        Report approved by: Kishor THOMPSON 2018 12:40 PM          Assessment and Plan:    In summary, Everton is a 53-year-old gentleman with nonischemic cardiomyopathy, currently on milrinone, who is being considered for advanced therapies.  He appears euvolemic today.  I did not make any medication adjustments today as his blood  pressure is low.  He will return to clinic in 2 weeks.      I am hoping to advance his heart failure regimen further at that time.  He would benefit from cardiac rehab as well.  I have sent a referral request to Dr. Donis to sign.  He would like to start cardiac rehab at [facility] as it is closer to his home.    1.  Chronic combined systolic and diastolic heart failure secondary to NICM.  Stage D  NYHA Class III  ACEi/ARB: no, deferred due to hypotension.  Will re-evaluate at office visit in 2 weeks.  BB: Deferred as he is on inotropes.  Aldosterone antagonist: Deferred due to hyponatremia.  SCD prophylaxis: CRT-D  BiV Pacing:  AP =60%.   = 59%.  BVP = 51%  Fluid status: Euvolemic  Anticoagulation: Warfarin with INR goal of 2-3.  Antiplatelet:  ASA dose   Sleep apnea:  Not discussed at this visit.  Will evaluate next visit.  NSAID use:  Contraindicated.  Avoid use.  Remote Monitoring: None.     2.  Atrial Fibrillation:  Chads Vasc score:  1.  Continue warfarin with an INR goal of 2-3 per primary cardiologist recommendations.  Continue amiodarone with routine amiodarone screening per protocol.    3.  Alcohol abuse:  Currently abstinent.  Continue periodic random drug screens at subsequent visits. Ordered for next visit.     3.  Follow-up:  CORE clinic in two weeks. Referral request sent to Dr. Donis for Cardiac Rehab order.       >40 minutes spent face to face with patient with >50% in counseling, education, and coordination of care      My LUJAN, ESTRELLITA  CORE Clinic

## 2018-11-28 NOTE — NURSING NOTE
Chief Complaint   Patient presents with     RECHECK     Return CORE: 53 year old male presents with systolic HF, EF 16% presents for follow up with labs prior     Duong Britton CMA at 4:41 PM on 11/28/2018     Medications and vitals reviewed with patient and confirmed.

## 2018-11-28 NOTE — TELEPHONE ENCOUNTER
Patient has an appointment today with CORE clinic, his request will be addressed at the appointment.

## 2018-11-30 ENCOUNTER — COMMUNICATION - HEALTHEAST (OUTPATIENT)
Dept: ANTICOAGULATION | Facility: CLINIC | Age: 53
End: 2018-11-30

## 2018-11-30 ENCOUNTER — OFFICE VISIT - HEALTHEAST (OUTPATIENT)
Dept: INTERNAL MEDICINE | Facility: CLINIC | Age: 53
End: 2018-11-30

## 2018-11-30 DIAGNOSIS — E03.9 HYPOTHYROIDISM, UNSPECIFIED TYPE: ICD-10-CM

## 2018-11-30 DIAGNOSIS — Z51.81 MEDICATION MONITORING ENCOUNTER: ICD-10-CM

## 2018-11-30 DIAGNOSIS — Q21.10 ASD (ATRIAL SEPTAL DEFECT): ICD-10-CM

## 2018-11-30 DIAGNOSIS — I48.0 PAROXYSMAL ATRIAL FIBRILLATION (H): ICD-10-CM

## 2018-11-30 DIAGNOSIS — K51.90 ULCERATIVE COLITIS WITHOUT COMPLICATIONS, UNSPECIFIED LOCATION (H): ICD-10-CM

## 2018-11-30 DIAGNOSIS — J45.30 MILD PERSISTENT ASTHMA WITHOUT COMPLICATION: ICD-10-CM

## 2018-11-30 DIAGNOSIS — I50.22 SYSTOLIC CHF, CHRONIC (H): ICD-10-CM

## 2018-11-30 DIAGNOSIS — Z86.79 HISTORY OF VENTRICULAR TACHYCARDIA: ICD-10-CM

## 2018-11-30 DIAGNOSIS — F34.1 DYSTHYMIA: ICD-10-CM

## 2018-11-30 DIAGNOSIS — K22.70 BARRETT'S ESOPHAGUS WITHOUT DYSPLASIA: ICD-10-CM

## 2018-11-30 DIAGNOSIS — I42.8 NON-ISCHEMIC CARDIOMYOPATHY (H): ICD-10-CM

## 2018-11-30 DIAGNOSIS — I48.92 ATRIAL FLUTTER (H): ICD-10-CM

## 2018-11-30 LAB — INR PPP: 1.8 (ref 0.9–1.1)

## 2018-12-05 ENCOUNTER — DOCUMENTATION ONLY (OUTPATIENT)
Dept: DENTISTRY | Facility: CLINIC | Age: 53
End: 2018-12-05

## 2018-12-06 NOTE — PROGRESS NOTES
Dental Service Clearance Letter  Everton Larios  is cleared for LVAD from a dental standpoint after having received care at the [facility]. Extraction of upper left maxillary 2nd molar (#15) was preformed.  He has been given oral hygiene instructions and oral health management products to help with any potential adverse effects of hisfuture treatments.  We will provide [him/ her] with follow-up care to assist with any concerns that may arise.   To be cleared for heart transplant he will need to complete the recommended restorations which are scheduled for 12/12/18. And deep cleanings (Scaling and Root Planing).   Thank you for allowing us to participate in the comprehensive care of Everton Larios.  For any questions or concerns, please contact me or the dental care coordinator ([phone]).    Emily Glaser   PGY 1  Pager: [phone]

## 2018-12-07 ENCOUNTER — ALLIED HEALTH/NURSE VISIT (OUTPATIENT)
Dept: CARDIOLOGY | Facility: CLINIC | Age: 53
End: 2018-12-07
Attending: INTERNAL MEDICINE
Payer: MEDICARE

## 2018-12-07 DIAGNOSIS — I50.22 CHRONIC SYSTOLIC HEART FAILURE (H): Primary | ICD-10-CM
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
## 2018-12-07 NOTE — MR AVS SNAPSHOT
After Visit Summary   12/7/2018    Everton Larios    MRN: [id number]           Patient Information     Date Of Birth          1965        Visit Information        Provider Department      12[facility]/2018 1:00 PM [facility]        Today's Diagnoses     Chronic systolic heart failure (H)    -  1       Follow-ups after your visit        Your next 10 appointments already scheduled     Dec 19, 2018  3:30 PM CST   Lab with  LAB   [facility] ([facility])    [address] [place] [address]  1st Floor  [facility] [id number] [phone]            Dec 19, 2018  4:00 PM CST   (Arrive by 3:45 PM)   CORE RETURN with My Norton NP   [facility] ([facility])    [address] [place] [address]  [address] [place] [id number] [phone]            Mar 13, 2019  4:00 PM CDT   Lab with  LAB   [facility] ([facility])    [address] [place] [id number] [phone]            Mar 13, 2019  4:30 PM CDT   (Arrive by 4:15 PM)   RETURN HEART FAILURE with Corinna Donis MD   [facility] ([facility])    [address] [place] [address]  [address] [place] [id number] [phone]              Who to contact     If you have questions or need follow up information about today's clinic visit or your schedule please contact [facility] directly at [phone].  Normal or non-critical lab and imaging results will be communicated to you by MyChart, letter or phone within 4 business days after the clinic has received the results. If you do not hear from us within 7 days, please contact the clinic through MyChart or phone. If you have a critical or abnormal lab result, we will notify you by phone as soon as possible.  Submit refill requests through [employer] or call your pharmacy and they will forward the refill request to us. Please allow 3  business days for your refill to be completed.          Additional Information About Your Visit        MyChart Information     [employer]hart gives you secure access to your electronic health record. If you see a primary care provider, you can also send messages to your care team and make appointments. If you have questions, please call your primary care clinic.  If you do not have a primary care provider, please call [phone] and they will assist you.        Care EveryWhere ID     This is your Care EveryWhere ID. This could be used by other organizations to access your [place] medical records  [id number]         Blood Pressure from Last 3 Encounters:   11/28/18 99/61   11/23/18 92/53   11/15/18 95/70    Weight from Last 3 Encounters:   11/28/18 63.5 kg (140 lb)   11/23/18 58.7 kg (129 lb 6.4 oz)   11/15/18 60.7 kg (133 lb 14.4 oz)              Today, you had the following     No orders found for display         Today's Medication Changes          These changes are accurate as of 12/7/18  5:07 PM.  If you have any questions, ask your nurse or doctor.               These medicines have changed or have updated prescriptions.        Dose/Directions    potassium chloride ER 20 MEQ CR tablet   Commonly known as:  K-DUR/KLOR-CON M   This may have changed:    - how much to take  - when to take this   Used for:  Hypokalemia        Dose:  40 mEq   Take 2 tablets (40 mEq) by mouth daily   Quantity:  180 tablet   Refills:  3                Primary Care Provider Office Phone # Fax #    Fredrick J [facility] [phone]       [address] [address]        Equal Access to Services     [facility] : Hadii aaron caro Soparvez, waaxda luqadaha, qaybta kaalmada dean fuentes. So [facility] [phone].    ATENCIÓN: Si habla español, tiene a anaya disposición servicios gratuitos de asistencia lingüística. Llame al [phone].    We comply with applicable  federal civil rights laws and [place] laws. We do not discriminate on the basis of race, color, national origin, age, disability, sex, sexual orientation, or gender identity.            Thank you!     Thank you for choosing [facility]  for your care. Our goal is always to provide you with excellent care. Hearing back from our patients is one way we can continue to improve our services. Please take a few minutes to complete the written survey that you may receive in the mail after your visit with us. Thank you!             Your Updated Medication List - Protect others around you: Learn how to safely use, store and throw away your medicines at www.disposemymeds.org.          This list is accurate as of 12/7/18  5:07 PM.  Always use your most recent med list.                   Brand Name Dispense Instructions for use Diagnosis    albuterol 108 (90 Base) MCG/ACT inhaler    PROAIR HFA/PROVENTIL HFA/VENTOLIN HFA     Inhale 2 puffs into the lungs every 6 hours as needed for shortness of breath / dyspnea or wheezing    Intermittent asthma without complication, unspecified asthma severity       amiodarone 200 MG tablet    PACERONE/CODARONE     Take 1 tablet (200 mg) by mouth daily    Paroxysmal atrial fibrillation (H)       ASACOL  MG EC tablet   Generic drug:  mesalamine      Take 3 tablets (2,400 mg) by mouth 2 times daily    Ulcerative colitis with complication, unspecified location (H)       azelastine 0.1 % nasal spray    ASTELIN     Spray 2 sprays into both nostrils 2 times daily    Chronic rhinitis       BEANO PO      Take 2 tablets by mouth 3 times daily        BETA BLOCKER NOT PRESCRIBED    INTENTIONAL     Beta Blocker not prescribed intentionally due to Recent tx with IV positive inotropic agent    Chronic systolic congestive heart failure (H)       DULoxetine 20 MG capsule    CYMBALTA     Take 20 mg by mouth daily        FLOMAX 0.4 MG capsule   Generic drug:  tamsulosin      Take 1 capsule (0.4  mg) by mouth daily (with dinner)    Benign prostatic hyperplasia, unspecified whether lower urinary tract symptoms present       fluticasone 220 MCG/ACT inhaler    FLOVENT HFA     Inhale 2 puffs into the lungs 2 times daily        furosemide 20 MG tablet    LASIX    30 tablet    Take 1 tablet (20 mg) by mouth 2 times daily    Chronic systolic congestive heart failure (H)       levothyroxine 88 MCG tablet    SYNTHROID/LEVOTHROID     Take 88 mcg by mouth daily        milrinone 20-5 MG/100ML-% infusion    PRIMACOR    100 mL    Inject 6.6375 mcg/min into the vein continuous    Chronic systolic congestive heart failure (H)       omeprazole 40 MG DR capsule    priLOSEC     Take 1 capsule (40 mg) by mouth daily In pm    Nausea       ondansetron 4 MG ODT tab    ZOFRAN-ODT     Take 1 tablet (4 mg) by mouth every 8 hours as needed for nausea    Nausea       potassium chloride ER 20 MEQ CR tablet    K-DUR/KLOR-CON M    180 tablet    Take 2 tablets (40 mEq) by mouth daily    Hypokalemia       SINGULAIR 10 MG tablet   Generic drug:  montelukast      Take 1 tablet (10 mg) by mouth At Bedtime    Intermittent asthma without complication, unspecified asthma severity       warfarin 5 MG tablet    COUMADIN     Take 2.5 mg on Mon/Wed/Fri and 5 mg on all other days of the week    Paroxysmal atrial fibrillation (H)

## 2018-12-10 DIAGNOSIS — I50.22 CHRONIC SYSTOLIC HEART FAILURE (H): Primary | Chronic | ICD-10-CM

## 2018-12-12 ENCOUNTER — AMBULATORY - HEALTHEAST (OUTPATIENT)
Dept: CARDIOLOGY | Facility: CLINIC | Age: 53
End: 2018-12-12

## 2018-12-12 DIAGNOSIS — Z95.810 ICD (IMPLANTABLE CARDIOVERTER-DEFIBRILLATOR), BIVENTRICULAR, IN SITU: ICD-10-CM

## 2018-12-12 DIAGNOSIS — I50.22 CHRONIC SYSTOLIC HEART FAILURE (H): Primary | Chronic | ICD-10-CM

## 2018-12-13 ENCOUNTER — AMBULATORY - HEALTHEAST (OUTPATIENT)
Dept: LAB | Facility: CLINIC | Age: 53
End: 2018-12-13

## 2018-12-13 ENCOUNTER — COMMUNICATION - HEALTHEAST (OUTPATIENT)
Dept: ANTICOAGULATION | Facility: CLINIC | Age: 53
End: 2018-12-13

## 2018-12-13 DIAGNOSIS — Q21.10 ASD (ATRIAL SEPTAL DEFECT): ICD-10-CM

## 2018-12-13 DIAGNOSIS — I48.92 ATRIAL FLUTTER (H): ICD-10-CM

## 2018-12-13 DIAGNOSIS — I48.0 PAROXYSMAL ATRIAL FIBRILLATION (H): ICD-10-CM

## 2018-12-13 LAB — INR PPP: 1.5 (ref 0.9–1.1)

## 2018-12-14 ENCOUNTER — CARE COORDINATION (OUTPATIENT)
Dept: CARDIOLOGY | Facility: CLINIC | Age: 53
End: 2018-12-14

## 2018-12-14 DIAGNOSIS — I50.22 CHRONIC SYSTOLIC CONGESTIVE HEART FAILURE (H): Primary | ICD-10-CM

## 2018-12-14 DIAGNOSIS — Z91.199 PATIENT'S NONCOMPLIANCE WITH OTHER MEDICAL TREATMENT AND REGIMEN: ICD-10-CM

## 2018-12-14 NOTE — PROGRESS NOTES
I called Everton to tell him that we talked about his case in the VAD / TXP meeting today. I told him Dr. Donis wants to see him in the CORE clinic weekly for a month. We will do at least two random urine ETOH tests in this time. I reminded Everton that he cannot drink alcohol anymore. He said he hasn't had a drink in months.    I ordered a Ethy glucuronide Urine for 12/19.

## 2018-12-18 ENCOUNTER — CARE COORDINATION (OUTPATIENT)
Dept: CARDIOLOGY | Facility: CLINIC | Age: 53
End: 2018-12-18

## 2018-12-18 DIAGNOSIS — I50.22 CHRONIC SYSTOLIC HEART FAILURE (H): Primary | ICD-10-CM

## 2018-12-19 ENCOUNTER — RECORDS - HEALTHEAST (OUTPATIENT)
Dept: ADMINISTRATIVE | Facility: OTHER | Age: 53
End: 2018-12-19

## 2018-12-19 ENCOUNTER — OFFICE VISIT (OUTPATIENT)
Dept: CARDIOLOGY | Facility: CLINIC | Age: 53
End: 2018-12-19
Attending: NURSE PRACTITIONER
Payer: MEDICARE

## 2018-12-19 VITALS
DIASTOLIC BLOOD PRESSURE: 71 MMHG | WEIGHT: 142 LBS | HEIGHT: 68 IN | SYSTOLIC BLOOD PRESSURE: 107 MMHG | OXYGEN SATURATION: 97 % | HEART RATE: 78 BPM | BODY MASS INDEX: 21.52 KG/M2

## 2018-12-19 DIAGNOSIS — E87.6 HYPOKALEMIA: ICD-10-CM

## 2018-12-19 DIAGNOSIS — I50.23 ACUTE ON CHRONIC SYSTOLIC HEART FAILURE (H): Primary | ICD-10-CM

## 2018-12-19 DIAGNOSIS — F10.11 ALCOHOL ABUSE, IN REMISSION: ICD-10-CM

## 2018-12-19 DIAGNOSIS — I48.0 PAROXYSMAL ATRIAL FIBRILLATION (H): ICD-10-CM

## 2018-12-19 DIAGNOSIS — Z91.199 PATIENT'S NONCOMPLIANCE WITH OTHER MEDICAL TREATMENT AND REGIMEN: ICD-10-CM

## 2018-12-19 DIAGNOSIS — I50.22 CHRONIC SYSTOLIC HEART FAILURE (H): ICD-10-CM

## 2018-12-19 LAB
ANION GAP SERPL CALCULATED.3IONS-SCNC: 7 MMOL/L (ref 3–14)
BUN SERPL-MCNC: 13 MG/DL (ref 7–30)
CALCIUM SERPL-MCNC: 8.7 MG/DL (ref 8.5–10.1)
CHLORIDE SERPL-SCNC: 102 MMOL/L (ref 94–109)
CO2 SERPL-SCNC: 26 MMOL/L (ref 20–32)
CREAT SERPL-MCNC: 0.86 MG/DL (ref 0.66–1.25)
GFR SERPL CREATININE-BSD FRML MDRD: >90 ML/MIN/{1.73_M2}
GLUCOSE SERPL-MCNC: 92 MG/DL (ref 70–99)
POTASSIUM SERPL-SCNC: 4 MMOL/L (ref 3.4–5.3)
SODIUM SERPL-SCNC: 136 MMOL/L (ref 133–144)

## 2018-12-19 PROCEDURE — 80307 DRUG TEST PRSMV CHEM ANLYZR: CPT | Performed by: NURSE PRACTITIONER

## 2018-12-19 PROCEDURE — 80321 ALCOHOLS BIOMARKERS 1OR 2: CPT | Performed by: INTERNAL MEDICINE

## 2018-12-19 PROCEDURE — 84999 UNLISTED CHEMISTRY PROCEDURE: CPT | Performed by: INTERNAL MEDICINE

## 2018-12-19 PROCEDURE — G0463 HOSPITAL OUTPT CLINIC VISIT: HCPCS | Mod: ZF

## 2018-12-19 PROCEDURE — 36415 COLL VENOUS BLD VENIPUNCTURE: CPT | Performed by: NURSE PRACTITIONER

## 2018-12-19 PROCEDURE — 80048 BASIC METABOLIC PNL TOTAL CA: CPT | Performed by: NURSE PRACTITIONER

## 2018-12-19 PROCEDURE — 99214 OFFICE O/P EST MOD 30 MIN: CPT | Mod: ZP | Performed by: NURSE PRACTITIONER

## 2018-12-19 RX ORDER — FUROSEMIDE 20 MG
TABLET ORAL
Qty: 90 TABLET | Refills: 3 | Status: SHIPPED | OUTPATIENT
Start: 2018-12-19 | End: 2019-01-30

## 2018-12-19 RX ORDER — POTASSIUM CHLORIDE 1500 MG/1
TABLET, EXTENDED RELEASE ORAL
Qty: 180 TABLET | Refills: 3 | Status: ON HOLD | OUTPATIENT
Start: 2018-12-19 | End: 2019-04-02

## 2018-12-19 ASSESSMENT — PAIN SCALES - GENERAL: PAINLEVEL: NO PAIN (0)

## 2018-12-19 ASSESSMENT — MIFFLIN-ST. JEOR: SCORE: 1453.61

## 2018-12-19 NOTE — PATIENT INSTRUCTIONS
"You were seen today in the Cardiovascular Clinic at the [facility].     Cardiology Providers you saw during your visit: My LUJAN CNP      Follow up and medication changes:    1. INCREASE Lasix to 40 mg in the morning, 20 mg in the afternoon,   2. INCREASE Potassium to 40 mEq in the morning, 20 mEq in the evening.   3. Follow up as scheduled with Dr Donis .   4. Return to see [facility] NP week of .       Results for ANA ROSA BALDWIN (MRN [id number]) as of 2018 16:09   Ref. Range 2018 15:29   Sodium Latest Ref Range: 133 - 144 mmol/L 136   Potassium Latest Ref Range: 3.4 - 5.3 mmol/L 4.0   Chloride Latest Ref Range: 94 - 109 mmol/L 102   Carbon Dioxide Latest Ref Range: 20 - 32 mmol/L 26   Urea Nitrogen Latest Ref Range: 7 - 30 mg/dL 13   Creatinine Latest Ref Range: 0.66 - 1.25 mg/dL 0.86   GFR Estimate Latest Ref Range: >60 mL/min/1.73_m2 >90   GFR Estimate If Black Latest Ref Range: >60 mL/min/1.73_m2 >90   Calcium Latest Ref Range: 8.5 - 10.1 mg/dL 8.7   Anion Gap Latest Ref Range: 3 - 14 mmol/L 7   Glucose Latest Ref Range: 70 - 99 mg/dL 92         Please limit your fluid intake to 2 L (68 ounces) daily.  2 Liters a day = 8.5 cups, or 68 ounces.  Please limit your salt intake to 2 grams a day or less.     If you gain 2# in 24 hours or 5# in one week call Dena Segovia RN so we can adjust your medications as needed over the phone.     Please feel free to call me with any questions or concerns.       Dena Segovia RN CHFN  [facility]  Cardiology Care Coordinator-Heart Failure Clinic     Questions and [phone].   First press #1 for the [employer] and then press #3 for \"Medical Questions\" to reach us Cardiology Nurses.      On Call Cardiologist for after hours or on weekends: [phone]   option #4 and ask to speak to the on-call Cardiologist. Inform them you are a CORE/heart failure patient at the " [place].           If you need a medication refill please contact your pharmacy.  Please allow 3 business days for your refill to be completed.  _______________________________________________________  [facility]INIC Cardiomyopathy, Optimization, Rehabilitation, Education   The C.O.R.E. CLINIC is a heart failure specialty clinic within the [facility] where you will work with specialized nurse practitioners dedicated to helping patients with heart failure carefully adjust medications, receive education, and learn who and when to call if symptoms develop. They specialize in helping you better understand your condition, slow the progression of your disease, improve the length and quality of your life, help you detect future heart problems before they become life threatening, and avoid hospitalizations.  As always, thank you for trusting us with your health care needs!

## 2018-12-19 NOTE — PROGRESS NOTES
HPI: Everton is a 53-year-old gentleman with a past medical history of atrial fibrillation s/p atrial fibrillation ablation, nonischemic cardiomyopathy with an EF of 15%, diastolic dysfunction, congenital ASD repair in childhood, s/p AICD placement in 2008 with subsequent generator change in 2018, PAF, hypothyroidism, alcohol abuse, remote history of GI bleed and splenic embolization, and cardiogenic shock requiring inotropes, who returns for routine follow up.     Everton has noticed more abdominal distension and early satiety. His appetite is poor.  He doesn't get hungry until the afternoon.  He is coughing up white thicker sputum.  He self adjusted his lasix and took an additional 80 mg of Lasix for one dose in conjunction with his scheduled 20 mg twice daily. He didn't take any additional supplemental potassium.  His weight is now back to 134.8 lbs.     He isn't active due to overall fatigue and worsening fatigue with minimal exertion. He hasn't started cardiac rehab yet.  He  would like to go to [place] as it is closer to his house.  Denies SOB at rest, PND, orthopnea, lightheadedness, or chest pain.      PAST MEDICAL HISTORY:  Past Medical History:   Diagnosis Date     Alcohol abuse      Pierce's esophagus 10/4/2018     Chronic rhinitis 10/4/2018     Chronic systolic heart failure (H) 10/4/2018     Depression 10/4/2018     Diastolic dysfunction      DJD (degenerative joint disease) - neck 10/4/2018     H/O congenital atrial septal defect (ASD) repair at age 3 10/4/2018     Hypothyroidism due to medication 10/4/2018     ICD, [place] scientific 2008; gen change 2/2018 10/4/2018     Intermittent asthma without complication 10/4/2018     Nonischemic cardiomyopathy (H) 10/4/2018     On amiodarone therapy 10/4/2018     Paroxysmal atrial fibrillation (H) 10/4/2018     S/P ablation of atrial fibrillation 10/4/2018     Systolic heart failure (H)      Ulcerative colitis (H) 10/4/2018     Ventricular tachycardia (H)  10/4/2018       FAMILY HISTORY:  No family history on file.    SOCIAL HISTORY:  Social History     Social History     Marital status: Single     Spouse name: N/A     Number of children: N/A     Years of education: N/A     Social History Main Topics     Smoking status: Former Smoker     Years: 10.00     Smokeless tobacco: Never Used     Alcohol use 0.6 oz/week     1 Cans of beer per week      Comment: a couple times a week      Drug use: No     Sexual activity: Not Asked     Other Topics Concern     None     Social History Narrative       CURRENT MEDICATIONS:  Current Outpatient Medications   Medication Sig Dispense Refill     albuterol (PROAIR HFA/PROVENTIL HFA/VENTOLIN HFA) 108 (90 Base) MCG/ACT inhaler Inhale 2 puffs into the lungs every 6 hours as needed for shortness of breath / dyspnea or wheezing       Alpha-D-Galactosidase (BEANO PO) Take 2 tablets by mouth 3 times daily        amiodarone (PACERONE/CODARONE) 200 MG tablet Take 1 tablet (200 mg) by mouth daily       azelastine (ASTELIN) 0.1 % nasal spray Spray 2 sprays into both nostrils 2 times daily       BETA BLOCKER NOT PRESCRIBED, INTENTIONAL, Beta Blocker not prescribed intentionally due to Recent tx with IV positive inotropic agent  0     DULoxetine (CYMBALTA) 20 MG EC capsule Take 20 mg by mouth daily       fluticasone (FLOVENT HFA) 220 MCG/ACT Inhaler Inhale 2 puffs into the lungs 2 times daily       furosemide (LASIX) 20 MG tablet Take 1 tablet (20 mg) by mouth 2 times daily 30 tablet 1     levothyroxine (SYNTHROID/LEVOTHROID) 88 MCG tablet Take 88 mcg by mouth daily       mesalamine (ASACOL HD) 800 MG EC tablet Take 3 tablets (2,400 mg) by mouth 2 times daily       milrinone (PRIMACOR) infusion 200 mcg/mL PREMIX Inject 6.6375 mcg/min into the vein continuous 100 mL 3     montelukast (SINGULAIR) 10 MG tablet Take 1 tablet (10 mg) by mouth At Bedtime       omeprazole (PRILOSEC) 40 MG capsule Take 1 capsule (40 mg) by mouth daily In pm        "ondansetron (ZOFRAN-ODT) 4 MG ODT tab Take 1 tablet (4 mg) by mouth every 8 hours as needed for nausea       potassium chloride SA (K-DUR/KLOR-CON M) 20 MEQ CR tablet Take 2 tablets (40 mEq) by mouth daily (Patient taking differently: Take 20 mEq by mouth 2 times daily ) 180 tablet 3     tamsulosin (FLOMAX) 0.4 MG capsule Take 1 capsule (0.4 mg) by mouth daily (with dinner)       warfarin (COUMADIN) 5 MG tablet Take 2.5 mg on Mon/Wed/Fri and 5 mg on all other days of the week         ROS:  Constitutional: Denies fever, chills, or fatigue.  Weight gain, now stable. Overall fatigue.  HEENT:  +Cough and rhinitis (chronic)  Cardiovascular:  See HPI  Pulmonary:  See HPI  GI:  +early satiety, abdominal distension, nausea  : +urinary frequency, no dysuria, or hematuria  Endocrine:  +Hypothyroidism  Musculoskeletal: +muscle weakness and neck pain  Neurological: Denies numbness or tingling.  Psychological: +anxiety and depression  Skin:  +pruritis    EXAM:  /71 (BP Location: Left arm, Patient Position: Chair, Cuff Size: Adult Regular)   Pulse 78   Ht 1.727 m (5' 8\")   Wt 64.4 kg (142 lb)   SpO2 97%   BMI 21.59 kg/m    General: alert, articulate, and in no acute distress.  HEENT: normocephalic, atraumatic, anicteric sclera, EOMI, mucosa moist, no cyanosis.   Neck: neck supple.  No adenopathy, masses, or carotid bruits.  JVP 10 cm.   Heart: regular rhythm, normal S1/S2, no murmur, gallop, rub.  Precordium quiet with normal PMI.   2/6 murmur heard most prominent LSB  Lungs: Clear, no rales, ronchi, or wheezing.  No accessory muscle use, respirations unlabored.   Abdomen: Firmer, distended, non-tender, bowel sounds present, no hepatomegaly  Extremities: No pitting edema.   No cyanosis.  Warm.  2+ pedal pulses.   Neurological: Alert and oriented x 3.  Normal speech and affect, no gross motor deficits  Skin:  No ecchymoses, rashes, or clubbing.       Labs:  CBC RESULTS:  Lab Results   Component Value Date    WBC 7.2 " 2018    RBC 5.36 2018    HGB 15.6 2018    HCT 46.8 2018    MCV 87 2018    MCH 29.1 2018    MCHC 33.3 2018    RDW 19.3 (H) 2018     2018       CMP RESULTS:  Lab Results   Component Value Date     2018    POTASSIUM 3.9 2018    CHLORIDE 101 2018    CO2 26 2018    ANIONGAP 7 2018    GLC 87 2018    BUN 13 2018    CR 0.68 2018    GFRESTIMATED >90 2018    GFRESTBLACK >90 2018    RADAMES 8.6 2018    BILITOTAL 1.9 (H) 2018    ALBUMIN 3.2 (L) 2018    ALKPHOS 180 (H) 2018    ALT 27 2018    AST 24 2018        INR RESULTS:  Lab Results   Component Value Date    INR 1.72 (H) 2018       No components found for: CK  Lab Results   Component Value Date    MAG 2.2 2018     Lab Results   Component Value Date    NTBNP 10,986 (H) 11/15/2018     @BRIEFLABR (dig)@    Most recent echocardiogram:  Recent Results (from the past 8760 hour(s))   ECHO COMPLETE WITH OPTISON    Narrative    [id number]  [id number]  [id number]  [id number]^RADHA^KIMBERLY^KEN           [facility],[place]  Echocardiography Laboratory  [address] [place] [id number]     Name: ANA ROSA BALDWIN  MRN: [id number]  : 1965  Study Date: 2018 10:06 AM  Age: 53 yrs  Gender: Male  Patient Location: [facility]  Reason For Study: CHF  History: CHF  Ordering Physician: KIMBERLY GARCIA  Referring Physician: ANNABEL JI  Performed By: Cristel Mcclain RDCS     BSA: 1.7 m2  Height: 68 in  Weight: 133 lb  BP: 100/76 mmHg  _____________________________________________________________________________  __        Procedure  Complete Portable Echo Adult. Contrast Optison. Optison (NDC #0253-5407-56)  given intravenously. Patient was given 6 ml mixture of 3 ml Optison and 6 ml  saline. 3 ml wasted.  _____________________________________________________________________________  __         Interpretation Summary  Severely (EF 10-20%) reduced left ventricular function is present. LVIDd: 6.3  cm. Severe diffuse hypokinesis is present. Biplane EF is 17%. LV apical  hypertrabeculation noted with no evidence of thrombus.  Grade III or advanced diastolic dysfunction.  Global right ventricular function is moderately reduced.  The right ventricle is normal size.There is moderate right ventricular  hypertrophy.  Severe biatrial enlargement is present.  Mild mitral insufficiency is present.  No pericardial effusion is present.  Previous study not available for comparison.     _____________________________________________________________________________  __        Left Ventricle  Left ventricular size is normal. EDV (BP) is normal at 131 ml. Mild eccentric  hypertrophy. Severely (EF 10-20%) reduced left ventricular function is  present. Grade III or advanced diastolic dysfunction. Biplane EF is 17%.  Severe diffuse hypokinesis is present.     Right Ventricle  The right ventricle is normal size. There is moderate right ventricular  hypertrophy. Global right ventricular function is moderately reduced. A  pacemaker lead is noted in the right ventricle.     Atria  Severe biatrial enlargement is present. The atrial septum is intact as  assessed by color Doppler .     Mitral Valve  Mild mitral annular calcification is present. Mild mitral insufficiency is  present.        Aortic Valve  Aortic valve is normal in structure and function. The aortic valve is  tricuspid.     Tricuspid Valve  Trace tricuspid insufficiency is present. The right ventricular systolic  pressure is approximated at 11.2 mmHg plus the right atrial pressure.     Pulmonic Valve  The pulmonic valve is normal.     Vessels  The aorta root is normal. The inferior vena cava was normal in size with  preserved respiratory variability. The pulmonary artery and bifurcation cannot  be assessed. Estimated mean right atrial pressure is 3 mmHg (normal).      Pericardium  No pericardial effusion is present.        Compared to Previous Study  Previous study not available for comparison.  _____________________________________________________________________________  __  MMode/2D Measurements & Calculations     IVSd: 0.84 cm  LVIDd: 6.3 cm  LVIDs: 5.5 cm  LVPWd: 0.82 cm  FS: 12.9 %  LV mass(C)d: 215.4 grams  LV mass(C)dI: 125.3 grams/m2  MV Diam: 3.6 cm  Ao root diam: 3.1 cm  LA dimension: 5.7 cm  asc Aorta Diam: 3.1 cm  LA/Ao: 1.8  LVOT diam: 2.4 cm  LVOT area: 4.5 cm2  LA Volume (BP): 210.0 ml  LA Volume Index (BP): 122.1 ml/m2     RWT: 0.26        Doppler Measurements & Calculations  MV E max ata: 105.0 cm/sec  MV Flow area(1diam): 10.2 cm2  Ao V2 max: 68.4 cm/sec  Ao max P.0 mmHg  Ao V2 mean: 54.2 cm/sec  Ao mean P.0 mmHg  Ao V2 VTI: 12.0 cm  ALONZO(I,D): 2.6 cm2  ALONZO(V,D): 3.0 cm2  LV V1 max P.83 mmHg  LV V1 max: 45.5 cm/sec  LV V1 VTI: 6.8 cm  SV(LVOT): 30.7 ml  SI(LVOT): 17.8 ml/m2  PA acc time: 0.07 sec  TR max ata: 164.0 cm/sec  TR max P.2 mmHg  AV Ata Ratio (DI): 0.67  ALONZO Index (cm2/m2): 1.5        _____________________________________________________________________________  __        Report approved by: Kishor THOMPSON 2018 12:40 PM        Right Heart Cath:  Mean RA pressure of 12, mean PA pressure of 37, wedge of 28, cardiac output by thermal dilution of 2.4, with an index of 1.4.  Jaden cardiac output of 2.0, with an index of 1.1.  SVR was 18.8.      Assessment and Plan:    In summary, Everton is a 53-year-old gentleman with nonischemic cardiomyopathy, currently on milrinone, who is being considered for advanced therapies.   He appears hypervolemic today. I increased his Furosemide to 40 mg in the am and 20 mg in the evening as well as his potassium to 40 meq in the am and 20 meq in the pm.  I reviewed the importance of medication compliance and instructed him to not self adjust medications unless instructed by a provider.      1.  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  Stage D  NYHA Class IIIA  ACEi/ARB:  None.  Consider initiating at next visit.  BB: Deferred as he is on inotropes.  Aldosterone antagonist: Deferred due to hyponatremia with prior attempts.  SCD prophylaxis: CRT-D  BiV Pacing:  AP =60%.   = 59%.  BVP = 51%  Fluid status: Hypervolemic. Diuresis as outlined above.  Anticoagulation: Warfarin with INR goal of 2-3.  Antiplatelet:  ASA dose none.  Sleep apnea:  Not discussed at this visit.  Will evaluate once more euvolemic.   NSAID use:  Contraindicated.  Avoid use.  Remote Monitoring: None.     2.  Atrial Fibrillation:  Chads Vasc score:  1.  Continue warfarin with an INR goal of 2-3 per primary cardiologist recommendations.  Continue amiodarone with routine amiodarone screening per protocol.    3.  Alcohol abuse:  Currently abstinent.  Drug screen performed today was negative, except for caffeine. Continue periodic random drug screens at subsequent visits.      3.  Follow-up:  Dr. Donis on January 2.  CORE January 14.      My LUJAN CNP  [facility]

## 2018-12-19 NOTE — LETTER
12/19/2018      RE: Everton Larios  [address] [facility] [address]       Dear Colleague,    Thank you for the opportunity to participate in the care of your patient, Everton Larios, at the [facility] at [facility]. Please see a copy of my visit note below.        Pl  HPI: Everton is a 53-year-old gentleman with a past medical history of atrial fibrillation s/p atrial fibrillation ablation, nonischemic cardiomyopathy with an EF of 15%, diastolic dysfunction, congenital ASD repair in childhood, s/p AICD placement in 2008 with subsequent generator change in 2018, PAF, hypothyroidism, alcohol abuse, remote history of GI bleed and splenic embolization, and cardiogenic shock requiring inotropes, who returns for routine follow up.     Everton has noticed more abdominal distension and early satiety. His appetite is poor.  He doesn't get hungry until the afternoon.  He is coughing up white thicker sputum.  He self adjusted his lasix and took an additional 80 mg of Lasix for one dose in conjunction with his scheduled 20 mg twice daily. He didn't take any additional supplemental potassium.  His weight is now back to 134.8 lbs.     He isn't active due to overall fatigue and worsening fatigue with minimal exertion. He hasn't started cardiac rehab yet.  He  would like to go to [place] as it is closer to his house.  Denies SOB at rest, PND, orthopnea, lightheadedness, or chest pain.      PAST MEDICAL HISTORY:  Past Medical History:   Diagnosis Date     Alcohol abuse      Pierce's esophagus 10/4/2018     Chronic rhinitis 10/4/2018     Chronic systolic heart failure (H) 10/4/2018     Depression 10/4/2018     Diastolic dysfunction      DJD (degenerative joint disease) - neck 10/4/2018     H/O congenital atrial septal defect (ASD) repair at age 3 10/4/2018     Hypothyroidism due to medication 10/4/2018     ICD, [place] [employer] 2008; gen change 2/2018 10/4/2018      Intermittent asthma without complication 10/4/2018     Nonischemic cardiomyopathy (H) 10/4/2018     On amiodarone therapy 10/4/2018     Paroxysmal atrial fibrillation (H) 10/4/2018     S/P ablation of atrial fibrillation 10/4/2018     Systolic heart failure (H)      Ulcerative colitis (H) 10/4/2018     Ventricular tachycardia (H) 10/4/2018       FAMILY HISTORY:  No family history on file.    SOCIAL HISTORY:  Social History     Social History     Marital status: Single     Spouse name: N/A     Number of children: N/A     Years of education: N/A     Social History Main Topics     Smoking status: Former Smoker     Years: 10.00     Smokeless tobacco: Never Used     Alcohol use 0.6 oz/week     1 Cans of beer per week      Comment: a couple times a week      Drug use: No     Sexual activity: Not Asked     Other Topics Concern     None     Social History Narrative       CURRENT MEDICATIONS:  Current Outpatient Medications   Medication Sig Dispense Refill     albuterol (PROAIR HFA/PROVENTIL HFA/VENTOLIN HFA) 108 (90 Base) MCG/ACT inhaler Inhale 2 puffs into the lungs every 6 hours as needed for shortness of breath / dyspnea or wheezing       Alpha-D-Galactosidase (BEANO PO) Take 2 tablets by mouth 3 times daily        amiodarone (PACERONE/CODARONE) 200 MG tablet Take 1 tablet (200 mg) by mouth daily       azelastine (ASTELIN) 0.1 % nasal spray Spray 2 sprays into both nostrils 2 times daily       BETA BLOCKER NOT PRESCRIBED, INTENTIONAL, Beta Blocker not prescribed intentionally due to Recent tx with IV positive inotropic agent  0     DULoxetine (CYMBALTA) 20 MG EC capsule Take 20 mg by mouth daily       fluticasone (FLOVENT HFA) 220 MCG/ACT Inhaler Inhale 2 puffs into the lungs 2 times daily       furosemide (LASIX) 20 MG tablet Take 1 tablet (20 mg) by mouth 2 times daily 30 tablet 1     levothyroxine (SYNTHROID/LEVOTHROID) 88 MCG tablet Take 88 mcg by mouth daily       mesalamine (ASACOL HD) 800 MG EC tablet Take 3  "tablets (2,400 mg) by mouth 2 times daily       milrinone (PRIMACOR) infusion 200 mcg/mL PREMIX Inject 6.6375 mcg/min into the vein continuous 100 mL 3     montelukast (SINGULAIR) 10 MG tablet Take 1 tablet (10 mg) by mouth At Bedtime       omeprazole (PRILOSEC) 40 MG capsule Take 1 capsule (40 mg) by mouth daily In pm       ondansetron (ZOFRAN-ODT) 4 MG ODT tab Take 1 tablet (4 mg) by mouth every 8 hours as needed for nausea       potassium chloride SA (K-DUR/KLOR-CON M) 20 MEQ CR tablet Take 2 tablets (40 mEq) by mouth daily (Patient taking differently: Take 20 mEq by mouth 2 times daily ) 180 tablet 3     tamsulosin (FLOMAX) 0.4 MG capsule Take 1 capsule (0.4 mg) by mouth daily (with dinner)       warfarin (COUMADIN) 5 MG tablet Take 2.5 mg on Mon/Wed/Fri and 5 mg on all other days of the week         ROS:  Constitutional: Denies fever, chills, or fatigue.  Weight gain, now stable. Overall fatigue.  HEENT:  +Cough and rhinitis (chronic)  Cardiovascular:  See HPI  Pulmonary:  See HPI  GI:  +early satiety, abdominal distension, nausea  : +urinary frequency, no dysuria, or hematuria  Endocrine:  +Hypothyroidism  Musculoskeletal: +muscle weakness and neck pain  Neurological: Denies numbness or tingling.  Psychological: +anxiety and depression  Skin:  +pruritis    EXAM:  /71 (BP Location: Left arm, Patient Position: Chair, Cuff Size: Adult Regular)   Pulse 78   Ht 1.727 m (5' 8\")   Wt 64.4 kg (142 lb)   SpO2 97%   BMI 21.59 kg/m     General: alert, articulate, and in no acute distress.  HEENT: normocephalic, atraumatic, anicteric sclera, EOMI, mucosa moist, no cyanosis.   Neck: neck supple.  No adenopathy, masses, or carotid bruits.  JVP 10 cm.   Heart: regular rhythm, normal S1/S2, no murmur, gallop, rub.  Precordium quiet with normal PMI.   2/6 murmur heard most prominent LSB  Lungs: Clear, no rales, ronchi, or wheezing.  No accessory muscle use, respirations unlabored.   Abdomen: Firmer, distended, " non-tender, bowel sounds present, no hepatomegaly  Extremities: No pitting edema.   No cyanosis.  Warm.  2+ pedal pulses.   Neurological: Alert and oriented x 3.  Normal speech and affect, no gross motor deficits  Skin:  No ecchymoses, rashes, or clubbing.       Labs:  CBC RESULTS:  Lab Results   Component Value Date    WBC 7.2 2018    RBC 5.36 2018    HGB 15.6 2018    HCT 46.8 2018    MCV 87 2018    MCH 29.1 2018    MCHC 33.3 2018    RDW 19.3 (H) 2018     2018       CMP RESULTS:  Lab Results   Component Value Date     2018    POTASSIUM 3.9 2018    CHLORIDE 101 2018    CO2 26 2018    ANIONGAP 7 2018    GLC 87 2018    BUN 13 2018    CR 0.68 2018    GFRESTIMATED >90 2018    GFRESTBLACK >90 2018    RADAMES 8.6 2018    BILITOTAL 1.9 (H) 2018    ALBUMIN 3.2 (L) 2018    ALKPHOS 180 (H) 2018    ALT 27 2018    AST 24 2018        INR RESULTS:  Lab Results   Component Value Date    INR 1.72 (H) 2018       No components found for: CK  Lab Results   Component Value Date    MAG 2.2 2018     Lab Results   Component Value Date    NTBNP 10,986 (H) 11/15/2018     @BRIEFLABR (dig)@    Most recent echocardiogram:  Recent Results (from the past 8760 hour(s))   ECHO COMPLETE WITH OPTISON    Narrative    [id number]  [id number]  [id number]  [id number]^RADHA^KIMBERLY^KEN           [facility]  Echocardiography Laboratory  [address] [place] [id number]     Name: ANA ROSA BALDWIN  MRN: [id number]  : 1965  Study Date: 2018 10:06 AM  Age: 53 yrs  Gender: Male  Patient Location: [facility]  Reason For Study: CHF  History: CHF  Ordering Physician: KIMBERLY GARCIA  Referring Physician: ANNABEL JI  Performed By: Cristel Mcclain RDCS     BSA: 1.7 m2  Height: 68 in  Weight: 133 lb  BP: 100/76  mmHg  _____________________________________________________________________________  __        Procedure  Complete Portable Echo Adult. Contrast Optison. Optison (NDC #2520-0596-58)  given intravenously. Patient was given 6 ml mixture of 3 ml Optison and 6 ml  saline. 3 ml wasted.  _____________________________________________________________________________  __        Interpretation Summary  Severely (EF 10-20%) reduced left ventricular function is present. LVIDd: 6.3  cm. Severe diffuse hypokinesis is present. Biplane EF is 17%. LV apical  hypertrabeculation noted with no evidence of thrombus.  Grade III or advanced diastolic dysfunction.  Global right ventricular function is moderately reduced.  The right ventricle is normal size.There is moderate right ventricular  hypertrophy.  Severe biatrial enlargement is present.  Mild mitral insufficiency is present.  No pericardial effusion is present.  Previous study not available for comparison.     _____________________________________________________________________________  __        Left Ventricle  Left ventricular size is normal. EDV (BP) is normal at 131 ml. Mild eccentric  hypertrophy. Severely (EF 10-20%) reduced left ventricular function is  present. Grade III or advanced diastolic dysfunction. Biplane EF is 17%.  Severe diffuse hypokinesis is present.     Right Ventricle  The right ventricle is normal size. There is moderate right ventricular  hypertrophy. Global right ventricular function is moderately reduced. A  pacemaker lead is noted in the right ventricle.     Atria  Severe biatrial enlargement is present. The atrial septum is intact as  assessed by color Doppler .     Mitral Valve  Mild mitral annular calcification is present. Mild mitral insufficiency is  present.        Aortic Valve  Aortic valve is normal in structure and function. The aortic valve is  tricuspid.     Tricuspid Valve  Trace tricuspid insufficiency is present. The right ventricular  systolic  pressure is approximated at 11.2 mmHg plus the right atrial pressure.     Pulmonic Valve  The pulmonic valve is normal.     Vessels  The aorta root is normal. The inferior vena cava was normal in size with  preserved respiratory variability. The pulmonary artery and bifurcation cannot  be assessed. Estimated mean right atrial pressure is 3 mmHg (normal).     Pericardium  No pericardial effusion is present.        Compared to Previous Study  Previous study not available for comparison.  _____________________________________________________________________________  __  MMode/2D Measurements & Calculations     IVSd: 0.84 cm  LVIDd: 6.3 cm  LVIDs: 5.5 cm  LVPWd: 0.82 cm  FS: 12.9 %  LV mass(C)d: 215.4 grams  LV mass(C)dI: 125.3 grams/m2  MV Diam: 3.6 cm  Ao root diam: 3.1 cm  LA dimension: 5.7 cm  asc Aorta Diam: 3.1 cm  LA/Ao: 1.8  LVOT diam: 2.4 cm  LVOT area: 4.5 cm2  LA Volume (BP): 210.0 ml  LA Volume Index (BP): 122.1 ml/m2     RWT: 0.26        Doppler Measurements & Calculations  MV E max ata: 105.0 cm/sec  MV Flow area(1diam): 10.2 cm2  Ao V2 max: 68.4 cm/sec  Ao max P.0 mmHg  Ao V2 mean: 54.2 cm/sec  Ao mean P.0 mmHg  Ao V2 VTI: 12.0 cm  ALONZO(I,D): 2.6 cm2  ALONZO(V,D): 3.0 cm2  LV V1 max P.83 mmHg  LV V1 max: 45.5 cm/sec  LV V1 VTI: 6.8 cm  SV(LVOT): 30.7 ml  SI(LVOT): 17.8 ml/m2  PA acc time: 0.07 sec  TR max ata: 164.0 cm/sec  TR max P.2 mmHg  AV Ata Ratio (DI): 0.67  ALONZO Index (cm2/m2): 1.5        _____________________________________________________________________________  __        Report approved by: Kishor THOMPSON 2018 12:40 PM        Right Heart Cath:  Mean RA pressure of 12, mean PA pressure of 37, wedge of 28, cardiac output by thermal dilution of 2.4, with an index of 1.4.  Jaden cardiac output of 2.0, with an index of 1.1.  SVR was 18.8.      Assessment and Plan:    In summary, Everton is a 53-year-old gentleman with nonischemic cardiomyopathy, currently on milrinone,  who is being considered for advanced therapies.   He appears hypervolemic today. I increased his Furosemide to 40 mg in the am and 20 mg in the evening as well as his potassium to 40 meq in the am and 20 meq in the pm.  I reviewed the importance of medication compliance and instructed him to not self adjust medications unless instructed by a provider.      1.  Chronic combined systolic and diastolic heart failure secondary to NICM.  Stage D  NYHA Class IIIA  ACEi/ARB:  None.  Consider initiating at next visit.  BB: Deferred as he is on inotropes.  Aldosterone antagonist: Deferred due to hyponatremia with prior attempts.  SCD prophylaxis: CRT-D  BiV Pacing:  AP =60%.   = 59%.  BVP = 51%  Fluid status: Hypervolemic. Diuresis as outlined above.  Anticoagulation: Warfarin with INR goal of 2-3.  Antiplatelet:  ASA dose none.  Sleep apnea:  Not discussed at this visit.  Will evaluate once more euvolemic.   NSAID use:  Contraindicated.  Avoid use.  Remote Monitoring: None.     2.  Atrial Fibrillation:  Chads Vasc score:  1.  Continue warfarin with an INR goal of 2-3 per primary cardiologist recommendations.  Continue amiodarone with routine amiodarone screening per protocol.    3.  Alcohol abuse:  Currently abstinent.  Drug screen performed today was negative, except for caffeine. Continue periodic random drug screens at subsequent visits.      3.  Follow-up:  Dr. Donis on January 2.  [facility] January 14.      My LUJAN CNP  [facility]

## 2018-12-19 NOTE — NURSING NOTE
Chief Complaint   Patient presents with     Follow Up     Return CORE: 53 year old male presents with systolic HF, EF 16% presents for follow up with labs prior     Medications reviewed and vitals performed.  Colleen Carmona CMA

## 2018-12-20 LAB
ACETAMINOPHEN QUAL: NEGATIVE
AMOBARBITAL QUAL: NEGATIVE
BARBITAL QUAL: NEGATIVE
BUTABARBITAL QUAL: NEGATIVE
BUTALBITAL QUAL: NEGATIVE
CAFFEINE QUAL: POSITIVE
CARBAMAZEPINE QUAL: NEGATIVE
CARISOPRODOL QUAL: NEGATIVE
CHLORPROPAMIDE UR-MCNC: NEGATIVE UG/ML
DRUGS SERPL SCN: NEGATIVE
ETHCLORVYNOL QUAL: NEGATIVE
ETHINAMATE QUAL: NEGATIVE
ETHOSUXIMIDE QUAL: NEGATIVE
ETHOTOIN QUAL: NEGATIVE
GLUTETHIMIDE QUAL: NEGATIVE
IBUPROFEN QUAL: NEGATIVE
MEPHENYTOIN QUAL: NEGATIVE
MEPHOBARBITAL QUAL: NEGATIVE
MEPROBAMATE QUAL: NEGATIVE
METHAQUALONE QUAL: NEGATIVE
METHARBITAL QUAL: NEGATIVE
METHSUXIMIDE QUAL: NEGATIVE
METHYPRYLON QUAL: NEGATIVE
MISCELLANEOUS TEST: NORMAL
PENTOBARBITAL QUAL: NEGATIVE
PHENACETIN QUAL: NEGATIVE
PHENOBARBITAL QUAL: NEGATIVE
PHENSUXIMIDE QUAL: NEGATIVE
PHENYTOIN QUAL: NEGATIVE
PRIMIDONE QUAL: NEGATIVE
SALICYLATE QUAL: NEGATIVE
SECOBARBITAL QUAL: NEGATIVE
TALBUTAL QUAL: NEGATIVE
THEOPHYLLINE QUAL: NEGATIVE
THIOPENTAL QUAL: NEGATIVE
TYBAMATE QUAL: NEGATIVE
VALPROIC ACID QUAL: NEGATIVE

## 2018-12-21 ENCOUNTER — HOME INFUSION (PRE-WILLOW HOME INFUSION) (OUTPATIENT)
Dept: PHARMACY | Facility: CLINIC | Age: 53
End: 2018-12-21

## 2018-12-26 LAB — LAB SCANNED RESULT: NORMAL

## 2018-12-26 NOTE — PROGRESS NOTES
This is a recent snapshot of the patient's [facility] medical record.  For current drug dose and complete information and questions, call [phone] or In Basket pool, [facility] home infusion ([id number])  CSN Number:  [id number]

## 2018-12-28 ENCOUNTER — COMMUNICATION - HEALTHEAST (OUTPATIENT)
Dept: ANTICOAGULATION | Facility: CLINIC | Age: 53
End: 2018-12-28

## 2018-12-28 DIAGNOSIS — Q21.10 ASD (ATRIAL SEPTAL DEFECT): ICD-10-CM

## 2018-12-31 DIAGNOSIS — I42.8 NONISCHEMIC CARDIOMYOPATHY (H): Primary | Chronic | ICD-10-CM

## 2019-01-02 ENCOUNTER — OFFICE VISIT (OUTPATIENT)
Dept: CARDIOLOGY | Facility: CLINIC | Age: 54
End: 2019-01-02
Attending: INTERNAL MEDICINE
Payer: COMMERCIAL

## 2019-01-02 ENCOUNTER — RECORDS - HEALTHEAST (OUTPATIENT)
Dept: ADMINISTRATIVE | Facility: OTHER | Age: 54
End: 2019-01-02

## 2019-01-02 VITALS
HEIGHT: 68 IN | SYSTOLIC BLOOD PRESSURE: 95 MMHG | HEART RATE: 79 BPM | OXYGEN SATURATION: 96 % | BODY MASS INDEX: 21.35 KG/M2 | DIASTOLIC BLOOD PRESSURE: 61 MMHG | WEIGHT: 140.9 LBS

## 2019-01-02 DIAGNOSIS — I50.22 CHRONIC SYSTOLIC HEART FAILURE (H): Chronic | ICD-10-CM

## 2019-01-02 DIAGNOSIS — I42.8 NONISCHEMIC CARDIOMYOPATHY (H): Chronic | ICD-10-CM

## 2019-01-02 LAB
ANION GAP SERPL CALCULATED.3IONS-SCNC: 8 MMOL/L (ref 3–14)
BUN SERPL-MCNC: 14 MG/DL (ref 7–30)
CALCIUM SERPL-MCNC: 8.9 MG/DL (ref 8.5–10.1)
CHLORIDE SERPL-SCNC: 101 MMOL/L (ref 94–109)
CO2 SERPL-SCNC: 27 MMOL/L (ref 20–32)
CREAT SERPL-MCNC: 0.93 MG/DL (ref 0.66–1.25)
GFR SERPL CREATININE-BSD FRML MDRD: >90 ML/MIN/{1.73_M2}
GLUCOSE SERPL-MCNC: 84 MG/DL (ref 70–99)
INR PPP: 2.05 (ref 0.86–1.14)
INR PPP: 2.05 (ref 0.9–1.1)
POTASSIUM SERPL-SCNC: 3.6 MMOL/L (ref 3.4–5.3)
SODIUM SERPL-SCNC: 136 MMOL/L (ref 133–144)
T4 FREE SERPL-MCNC: 1.32 NG/DL (ref 0.76–1.46)
TSH SERPL DL<=0.005 MIU/L-ACNC: 10.41 MU/L (ref 0.4–4)

## 2019-01-02 PROCEDURE — 36415 COLL VENOUS BLD VENIPUNCTURE: CPT | Performed by: INTERNAL MEDICINE

## 2019-01-02 PROCEDURE — 99214 OFFICE O/P EST MOD 30 MIN: CPT | Mod: GC | Performed by: INTERNAL MEDICINE

## 2019-01-02 PROCEDURE — 84443 ASSAY THYROID STIM HORMONE: CPT | Performed by: INTERNAL MEDICINE

## 2019-01-02 PROCEDURE — 85610 PROTHROMBIN TIME: CPT | Performed by: INTERNAL MEDICINE

## 2019-01-02 PROCEDURE — G0463 HOSPITAL OUTPT CLINIC VISIT: HCPCS | Mod: ZF

## 2019-01-02 PROCEDURE — 80048 BASIC METABOLIC PNL TOTAL CA: CPT | Performed by: INTERNAL MEDICINE

## 2019-01-02 PROCEDURE — 84439 ASSAY OF FREE THYROXINE: CPT | Performed by: INTERNAL MEDICINE

## 2019-01-02 RX ORDER — SPIRONOLACTONE 25 MG/1
12.5 TABLET ORAL DAILY
Qty: 45 TABLET | Refills: 3 | Status: SHIPPED | OUTPATIENT
Start: 2019-01-02 | End: 2019-01-14

## 2019-01-02 ASSESSMENT — MIFFLIN-ST. JEOR: SCORE: 1448.62

## 2019-01-02 ASSESSMENT — PAIN SCALES - GENERAL: PAINLEVEL: NO PAIN (0)

## 2019-01-02 NOTE — LETTER
1/2/2019      RE: Everton Larios  [address] [facility] [address]       Dear Colleague,    Thank you for the opportunity to participate in the care of your patient, Everton Larios, at the [facility] HEART [facility] at [facility]. Please see a copy of my visit note below.    HEART FAILURE CLINIC    HPI: Everton is a 53-year-old gentleman with a past medical history of atrial fibrillation s/p atrial fibrillation ablation, nonischemic cardiomyopathy with an EF of 15%, diastolic dysfunction, congenital ASD repair in childhood, s/p AICD placement in 2008 with subsequent generator change in 2018, PAF, hypothyroidism, alcohol abuse, remote history of GI bleed and splenic embolization, and a recent admission in 11/18 for ambulatory cardiogenic shock requiring initiation of IV inotropes, who returns wishing to establish care with Dr. Donis.    During his recent hospitalization he was initially started on IV afterload reduction and dobutamine. He was then transitioned to milrinone 0.125mcg/kg/min with great improvements in HD. Course complicated by an FRANKLIN which resolved after discontinuation of oral vasodilators and aldactone. He was discharged at 129Lbs.    He then followed up in core clinic with slight hypervolemia.His Furosemide was increased to 40 mg in the am and 20 mg in the evening as well as his potassium to 40 meq in the am and 20 meq in the pm.    He endorses some occasional chest pain intermittent not related to activity. Weight today is 140lbs in our scale 134lbs. He has had no issues with the pump. Denies any shortness of breath PND, orthopnea or lightheadedness of dizziness or syncopal events. Labs today show a K of 3.6 and stable BUN SCr Na 136.    Current cardiac meds  Amiodarone 200mg daily  milrinone 0.125mcg/kg/min   Lasix 40mg in the am 20mg in the pm  Kdur 40mEq int he am 20mEq in the evening  Coumadin     PAST MEDICAL HISTORY:  Past Medical History:    Diagnosis Date     Alcohol abuse      Pierce's esophagus 10/4/2018     Chronic rhinitis 10/4/2018     Chronic systolic heart failure (H) 10/4/2018     Depression 10/4/2018     Diastolic dysfunction      DJD (degenerative joint disease) - neck 10/4/2018     H/O congenital atrial septal defect (ASD) repair at age 3 10/4/2018     Hypothyroidism due to medication 10/4/2018     ICD, [employer] 2008; gen change 2/2018 10/4/2018     Intermittent asthma without complication 10/4/2018     Nonischemic cardiomyopathy (H) 10/4/2018     On amiodarone therapy 10/4/2018     Paroxysmal atrial fibrillation (H) 10/4/2018     S/P ablation of atrial fibrillation 10/4/2018     Systolic heart failure (H)      Ulcerative colitis (H) 10/4/2018     Ventricular tachycardia (H) 10/4/2018       FAMILY HISTORY:  No family history on file.    SOCIAL HISTORY:  Social History     Social History     Marital status: Single     Spouse name: N/A     Number of children: N/A     Years of education: N/A     Social History Main Topics     Smoking status: Former Smoker     Years: 10.00     Smokeless tobacco: Never Used     Alcohol use 0.6 oz/week     1 Cans of beer per week      Comment: a couple times a week      Drug use: No     Sexual activity: Not Asked     Other Topics Concern     None     Social History Narrative       CURRENT MEDICATIONS:  Current Outpatient Medications   Medication Sig Dispense Refill     albuterol (PROAIR HFA/PROVENTIL HFA/VENTOLIN HFA) 108 (90 Base) MCG/ACT inhaler Inhale 2 puffs into the lungs every 6 hours as needed for shortness of breath / dyspnea or wheezing       Alpha-D-Galactosidase (BEANO PO) Take 2 tablets by mouth 3 times daily        amiodarone (PACERONE/CODARONE) 200 MG tablet Take 1 tablet (200 mg) by mouth daily       azelastine (ASTELIN) 0.1 % nasal spray Spray 2 sprays into both nostrils 2 times daily       BETA BLOCKER NOT PRESCRIBED, INTENTIONAL, Beta Blocker not prescribed intentionally due to Recent  "tx with IV positive inotropic agent  0     DULoxetine (CYMBALTA) 20 MG EC capsule Take 20 mg by mouth daily       fluticasone (FLOVENT HFA) 220 MCG/ACT Inhaler Inhale 2 puffs into the lungs 2 times daily       furosemide (LASIX) 20 MG tablet Take 40 mg in the am and 20 mg in the afternoon. 90 tablet 3     levothyroxine (SYNTHROID/LEVOTHROID) 88 MCG tablet Take 88 mcg by mouth daily       mesalamine (ASACOL HD) 800 MG EC tablet Take 3 tablets (2,400 mg) by mouth 2 times daily       milrinone (PRIMACOR) infusion 200 mcg/mL PREMIX Inject 6.6375 mcg/min into the vein continuous 100 mL 3     montelukast (SINGULAIR) 10 MG tablet Take 1 tablet (10 mg) by mouth At Bedtime       omeprazole (PRILOSEC) 40 MG capsule Take 1 capsule (40 mg) by mouth daily In pm       ondansetron (ZOFRAN-ODT) 4 MG ODT tab Take 1 tablet (4 mg) by mouth every 8 hours as needed for nausea       potassium chloride ER (K-DUR/KLOR-CON M) 20 MEQ CR tablet Take 40 mg in the am and 20 mg in the afternoon. 180 tablet 3     tamsulosin (FLOMAX) 0.4 MG capsule Take 1 capsule (0.4 mg) by mouth daily (with dinner)       warfarin (COUMADIN) 5 MG tablet Take 2.5 mg on Mon/Wed/Fri and 5 mg on all other days of the week       EXAM:  BP 95/61   Pulse 79   Ht 1.727 m (5' 8\")   Wt 63.9 kg (140 lb 14.4 oz)   SpO2 96%   BMI 21.42 kg/m     General: alert and in no acute distress.  HEENT: normocephalic, atraumatic, anicteric sclera, EOMI, mucosa moist, no cyanosis.   Neck: neck supple.  No adenopathy, masses, or carotid bruits.  JVP flat.   Heart: regular rhythm, normal S1/S2 gallop, extrasystoles.  Precordium quiet with normal PMI.   2/6 murmur heard most prominent LSB  Lungs: Clear, no rales, ronchi, or wheezing.  No accessory muscle use, respirations unlabored.   Abdomen:non-tender, bowel sounds present, no hepatomegaly  Extremities: No pitting edema.   No cyanosis.  Warm.  2+ pedal pulses.   Neurological: Alert and oriented x 3.  Normal speech and affect, no " gross motor deficits  Skin:  No ecchymoses, rashes, or clubbing.       Labs:  CBC RESULTS:  Lab Results   Component Value Date    WBC 7.2 2018    RBC 5.36 2018    HGB 15.6 2018    HCT 46.8 2018    MCV 87 2018    MCH 29.1 2018    MCHC 33.3 2018    RDW 19.3 (H) 2018     2018       CMP RESULTS:  Lab Results   Component Value Date     2019    POTASSIUM 3.6 2019    CHLORIDE 101 2019    CO2 27 2019    ANIONGAP 8 2019    GLC 84 2019    BUN 14 2019    CR 0.93 2019    GFRESTIMATED >90 2019    GFRESTBLACK >90 2019    RADAMES 8.9 2019    BILITOTAL 1.9 (H) 2018    ALBUMIN 3.2 (L) 2018    ALKPHOS 180 (H) 2018    ALT 27 2018    AST 24 2018        INR RESULTS:  Lab Results   Component Value Date    INR 1.72 (H) 2018       No components found for: CK  Lab Results   Component Value Date    MAG 2.2 2018     Lab Results   Component Value Date    NTBNP 10,986 (H) 11/15/2018     @BRIEFLABR (dig)@    Most recent echocardiogram:  Recent Results (from the past 8760 hour(s))   ECHO COMPLETE WITH OPTISON    Narrative    [id number]  [id number]  [id number]  [id number]^RADHA^KIMBERLY^KEN           [facility] [place]  Echocardiography Laboratory  [address] [place] [id number]     Name: ANA ROSA BALDWIN  MRN: [id number]  : 1965  Study Date: 2018 10:06 AM  Age: 53 yrs  Gender: Male  Patient Location: [facility]  Reason For Study: CHF  History: CHF  Ordering Physician: KIMBERLY GARCIA  Referring Physician: ANNABEL JI  Performed By: Cristel Mcclain RDCS     BSA: 1.7 m2  Height: 68 in  Weight: 133 lb  BP: 100/76 mmHg  _____________________________________________________________________________  __        Procedure  Complete Portable Echo Adult. Contrast Optison. Optison (NDC #0116-4899-61)  given intravenously. Patient was given 6 ml  mixture of 3 ml Optison and 6 ml  saline. 3 ml wasted.  _____________________________________________________________________________  __        Interpretation Summary  Severely (EF 10-20%) reduced left ventricular function is present. LVIDd: 6.3  cm. Severe diffuse hypokinesis is present. Biplane EF is 17%. LV apical  hypertrabeculation noted with no evidence of thrombus.  Grade III or advanced diastolic dysfunction.  Global right ventricular function is moderately reduced.  The right ventricle is normal size.There is moderate right ventricular  hypertrophy.  Severe biatrial enlargement is present.  Mild mitral insufficiency is present.  No pericardial effusion is present.  Previous study not available for comparison.     _____________________________________________________________________________  __        Left Ventricle  Left ventricular size is normal. EDV (BP) is normal at 131 ml. Mild eccentric  hypertrophy. Severely (EF 10-20%) reduced left ventricular function is  present. Grade III or advanced diastolic dysfunction. Biplane EF is 17%.  Severe diffuse hypokinesis is present.     Right Ventricle  The right ventricle is normal size. There is moderate right ventricular  hypertrophy. Global right ventricular function is moderately reduced. A  pacemaker lead is noted in the right ventricle.     Atria  Severe biatrial enlargement is present. The atrial septum is intact as  assessed by color Doppler .     Mitral Valve  Mild mitral annular calcification is present. Mild mitral insufficiency is  present.        Aortic Valve  Aortic valve is normal in structure and function. The aortic valve is  tricuspid.     Tricuspid Valve  Trace tricuspid insufficiency is present. The right ventricular systolic  pressure is approximated at 11.2 mmHg plus the right atrial pressure.     Pulmonic Valve  The pulmonic valve is normal.     Vessels  The aorta root is normal. The inferior vena cava was normal in size with  preserved  respiratory variability. The pulmonary artery and bifurcation cannot  be assessed. Estimated mean right atrial pressure is 3 mmHg (normal).     Pericardium  No pericardial effusion is present.        Compared to Previous Study  Previous study not available for comparison.  _____________________________________________________________________________  __  MMode/2D Measurements & Calculations     IVSd: 0.84 cm  LVIDd: 6.3 cm  LVIDs: 5.5 cm  LVPWd: 0.82 cm  FS: 12.9 %  LV mass(C)d: 215.4 grams  LV mass(C)dI: 125.3 grams/m2  MV Diam: 3.6 cm  Ao root diam: 3.1 cm  LA dimension: 5.7 cm  asc Aorta Diam: 3.1 cm  LA/Ao: 1.8  LVOT diam: 2.4 cm  LVOT area: 4.5 cm2  LA Volume (BP): 210.0 ml  LA Volume Index (BP): 122.1 ml/m2     RWT: 0.26        Doppler Measurements & Calculations  MV E max ata: 105.0 cm/sec  MV Flow area(1diam): 10.2 cm2  Ao V2 max: 68.4 cm/sec  Ao max P.0 mmHg  Ao V2 mean: 54.2 cm/sec  Ao mean P.0 mmHg  Ao V2 VTI: 12.0 cm  ALONZO(I,D): 2.6 cm2  ALONZO(V,D): 3.0 cm2  LV V1 max P.83 mmHg  LV V1 max: 45.5 cm/sec  LV V1 VTI: 6.8 cm  SV(LVOT): 30.7 ml  SI(LVOT): 17.8 ml/m2  PA acc time: 0.07 sec  TR max ata: 164.0 cm/sec  TR max P.2 mmHg  AV Ata Ratio (DI): 0.67  ALONZO Index (cm2/m2): 1.5        _____________________________________________________________________________  __        Report approved by: Kishor THOMPSON 2018 12:40 PM        Right Heart Cath:  Mean RA pressure of 12, mean PA pressure of 37, wedge of 28, cardiac output by thermal dilution of 2.4, with an index of 1.4.  Jaden cardiac output of 2.0, with an index of 1.1.  SVR was 18.8.      Echo  Severely (EF 10-20%) reduced left ventricular function is present. LVIDd: 6.3  cm. Severe diffuse hypokinesis is present. Biplane EF is 17%. LV apical  hypertrabeculation noted with no evidence of thrombus.  Grade III or advanced diastolic dysfunction.  Global right ventricular function is moderately reduced.  The right ventricle is normal  size.There is moderate right ventricular  hypertrophy.  Severe biatrial enlargement is present.  Mild mitral insufficiency is present.  No pericardial effusion is present.    Assessment and Plan:    In summary, Everton is a 53-year-old gentleman with nonischemic cardiomyopathy,  on milrinone, who is being considered for advanced therapies.   He appears euvolemic today and previous tox screen has been negative. He has had no issues with milrinone and is feeling significantly better though not back at baseline.        1.  Chronic combined systolic and diastolic heart failure secondary to NICM.  Stage D  NYHA Class IIIA  ACEi/ARB:  None. No BP room for addition of vasodilators , currently on milrinone  BB: Deferred as he is on inotropes.  Aldosterone antagonist: will start low dose spironolactone K today 3.4, 12.5mg   SCD prophylaxis: CRT-D  BiV Pacing:  AP =60%.   = 59%.  BVP = 51%  Fluid status: Euvolemic today  Anticoagulation: Warfarin with INR goal of 2-3.- will recheck INR today.  Antiplatelet:  ASA dose none.  NSAID use:  Contraindicated.  Avoid use.  Remote Monitoring: None.     If he remains stable, compliant and with negative tox screens will try to get him to surgery in Feb.     2.  Atrial Fibrillation:  Chads Vasc score:  1.   Continue warfarin with an INR goal of 2-3.    Continue amiodarone with routine amiodarone screening per protocol.    3.  Alcohol abuse:  Currently abstinent.  Last test 12/19/18 negative, included  PEth at core clinic negative.  Will recheck in a week    Patient seen and discussed with Dr. Donis    We will see him again in 4 weeks.     Sena Mckeon [facility]  Cardiology fellow, PGY-5      I have reviewed today's vital signs, notes, medications, labs and imaging. I have also seen and examined the patient and agree with the findings and plan as outlined above.    Corinna Donis MD  Section Head - Advanced Heart Failure, Transplantation and Mechanical Circulatory  Support  Co-Director - Adult Congenital and Cardiovascular Genetics Center  Associate Professor of Medicine, [facility]

## 2019-01-02 NOTE — PROGRESS NOTES
HEART FAILURE CLINIC      HPI: Everton is a 53-year-old gentleman with a past medical history of atrial fibrillation s/p atrial fibrillation ablation, nonischemic cardiomyopathy with an EF of 15%, diastolic dysfunction, congenital ASD repair in childhood, s/p AICD placement in 2008 with subsequent generator change in 2018, PAF, hypothyroidism, alcohol abuse, remote history of GI bleed and splenic embolization, and a recent admission in 11/18 for ambulatory cardiogenic shock requiring initiation of IV inotropes, who returns wishing to establish care with Dr. Donis.    During his recent hospitalization he was initially started on IV afterload reduction and dobutamine. He was then transitioned to milrinone 0.125mcg/kg/min with great improvements in HD. Course complicated by an FRANKLIN which resolved after discontinuation of oral vasodilators and aldactone. He was discharged at 129Lbs.    He then followed up in core clinic with slight hypervolemia.His Furosemide was increased to 40 mg in the am and 20 mg in the evening as well as his potassium to 40 meq in the am and 20 meq in the pm.    He endorses some occasional chest pain intermittent not related to activity. Weight today is 140lbs in our scale 134lbs. He has had no issues with the pump. Denies any shortness of breath PND, orthopnea or lightheadedness of dizziness or syncopal events. Labs today show a K of 3.6 and stable BUN SCr Na 136.    Current cardiac meds  Amiodarone 200mg daily  milrinone 0.125mcg/kg/min   Lasix 40mg in the am 20mg in the pm  Kdur 40mEq int he am 20mEq in the evening  Coumadin     PAST MEDICAL HISTORY:  Past Medical History:   Diagnosis Date     Alcohol abuse      Pierce's esophagus 10/4/2018     Chronic rhinitis 10/4/2018     Chronic systolic heart failure (H) 10/4/2018     Depression 10/4/2018     Diastolic dysfunction      DJD (degenerative joint disease) - neck 10/4/2018     H/O congenital atrial septal defect (ASD) repair at age 3 10/4/2018      Hypothyroidism due to medication 10/4/2018     ICD, [employer] scientific 2008; gen change 2/2018 10/4/2018     Intermittent asthma without complication 10/4/2018     Nonischemic cardiomyopathy (H) 10/4/2018     On amiodarone therapy 10/4/2018     Paroxysmal atrial fibrillation (H) 10/4/2018     S/P ablation of atrial fibrillation 10/4/2018     Systolic heart failure (H)      Ulcerative colitis (H) 10/4/2018     Ventricular tachycardia (H) 10/4/2018       FAMILY HISTORY:  No family history on file.    SOCIAL HISTORY:  Social History     Social History     Marital status: Single     Spouse name: N/A     Number of children: N/A     Years of education: N/A     Social History Main Topics     Smoking status: Former Smoker     Years: 10.00     Smokeless tobacco: Never Used     Alcohol use 0.6 oz/week     1 Cans of beer per week      Comment: a couple times a week      Drug use: No     Sexual activity: Not Asked     Other Topics Concern     None     Social History Narrative       CURRENT MEDICATIONS:  Current Outpatient Medications   Medication Sig Dispense Refill     albuterol (PROAIR HFA/PROVENTIL HFA/VENTOLIN HFA) 108 (90 Base) MCG/ACT inhaler Inhale 2 puffs into the lungs every 6 hours as needed for shortness of breath / dyspnea or wheezing       Alpha-D-Galactosidase (BEANO PO) Take 2 tablets by mouth 3 times daily        amiodarone (PACERONE/CODARONE) 200 MG tablet Take 1 tablet (200 mg) by mouth daily       azelastine (ASTELIN) 0.1 % nasal spray Spray 2 sprays into both nostrils 2 times daily       BETA BLOCKER NOT PRESCRIBED, INTENTIONAL, Beta Blocker not prescribed intentionally due to Recent tx with IV positive inotropic agent  0     DULoxetine (CYMBALTA) 20 MG EC capsule Take 20 mg by mouth daily       fluticasone (FLOVENT HFA) 220 MCG/ACT Inhaler Inhale 2 puffs into the lungs 2 times daily       furosemide (LASIX) 20 MG tablet Take 40 mg in the am and 20 mg in the afternoon. 90 tablet 3     levothyroxine  "(SYNTHROID/LEVOTHROID) 88 MCG tablet Take 88 mcg by mouth daily       mesalamine (ASACOL HD) 800 MG EC tablet Take 3 tablets (2,400 mg) by mouth 2 times daily       milrinone (PRIMACOR) infusion 200 mcg/mL PREMIX Inject 6.6375 mcg/min into the vein continuous 100 mL 3     montelukast (SINGULAIR) 10 MG tablet Take 1 tablet (10 mg) by mouth At Bedtime       omeprazole (PRILOSEC) 40 MG capsule Take 1 capsule (40 mg) by mouth daily In pm       ondansetron (ZOFRAN-ODT) 4 MG ODT tab Take 1 tablet (4 mg) by mouth every 8 hours as needed for nausea       potassium chloride ER (K-DUR/KLOR-CON M) 20 MEQ CR tablet Take 40 mg in the am and 20 mg in the afternoon. 180 tablet 3     tamsulosin (FLOMAX) 0.4 MG capsule Take 1 capsule (0.4 mg) by mouth daily (with dinner)       warfarin (COUMADIN) 5 MG tablet Take 2.5 mg on Mon/Wed/Fri and 5 mg on all other days of the week         ROS:  Constitutional: Denies fever, chills, or fatigue.  Weight gain, now stable. Overall fatigue.  HEENT:  +Cough and rhinitis (chronic)  Cardiovascular:  See HPI  Pulmonary:  See HPI  GI:  No gi symptoms currently  :no urinary frequency, no dysuria, or hematuria  Endocrine:  +Hypothyroidism  Musculoskeletal: +muscle weakness and neck pain has improved notoriously  Neurological: Denies numbness or tingling.  Psychological: + depression  Skin:  +pruritis    EXAM:  BP 95/61   Pulse 79   Ht 1.727 m (5' 8\")   Wt 63.9 kg (140 lb 14.4 oz)   SpO2 96%   BMI 21.42 kg/m    General: alert and in no acute distress.  HEENT: normocephalic, atraumatic, anicteric sclera, EOMI, mucosa moist, no cyanosis.   Neck: neck supple.  No adenopathy, masses, or carotid bruits.  JVP flat.   Heart: regular rhythm, normal S1/S2 gallop, extrasystoles.  Precordium quiet with normal PMI.   2/6 murmur heard most prominent LSB  Lungs: Clear, no rales, ronchi, or wheezing.  No accessory muscle use, respirations unlabored.   Abdomen:non-tender, bowel sounds present, no " hepatomegaly  Extremities: No pitting edema.   No cyanosis.  Warm.  2+ pedal pulses.   Neurological: Alert and oriented x 3.  Normal speech and affect, no gross motor deficits  Skin:  No ecchymoses, rashes, or clubbing.       Labs:  CBC RESULTS:  Lab Results   Component Value Date    WBC 7.2 2018    RBC 5.36 2018    HGB 15.6 2018    HCT 46.8 2018    MCV 87 2018    MCH 29.1 2018    MCHC 33.3 2018    RDW 19.3 (H) 2018     2018       CMP RESULTS:  Lab Results   Component Value Date     2019    POTASSIUM 3.6 2019    CHLORIDE 101 2019    CO2 27 2019    ANIONGAP 8 2019    GLC 84 2019    BUN 14 2019    CR 0.93 2019    GFRESTIMATED >90 2019    GFRESTBLACK >90 2019    RADAMES 8.9 2019    BILITOTAL 1.9 (H) 2018    ALBUMIN 3.2 (L) 2018    ALKPHOS 180 (H) 2018    ALT 27 2018    AST 24 2018        INR RESULTS:  Lab Results   Component Value Date    INR 1.72 (H) 2018       No components found for: CK  Lab Results   Component Value Date    MAG 2.2 2018     Lab Results   Component Value Date    NTBNP 10,986 (H) 11/15/2018     @BRIEFLABR (dig)@    Most recent echocardiogram:  Recent Results (from the past 8760 hour(s))   ECHO COMPLETE WITH OPTISON    Narrative    [id number]  [id number]  [id number]  [id number]^RADHA^KIMBERLY^KEN           [facility]  Echocardiography Laboratory  [address] [place] [id number]     Name: ANA ROSA BALDWIN  MRN: [id number]  : 1965  Study Date: 2018 10:06 AM  Age: 53 yrs  Gender: Male  Patient Location: [facility]  Reason For Study: CHF  History: CHF  Ordering Physician: KIMBERLY GARCIA  Referring Physician: ANNABEL JI  Performed By: Cristel Mcclain RDCS     BSA: 1.7 m2  Height: 68 in  Weight: 133 lb  BP: 100/76  mmHg  _____________________________________________________________________________  __        Procedure  Complete Portable Echo Adult. Contrast Optison. Optison (NDC #6481-9241-84)  given intravenously. Patient was given 6 ml mixture of 3 ml Optison and 6 ml  saline. 3 ml wasted.  _____________________________________________________________________________  __        Interpretation Summary  Severely (EF 10-20%) reduced left ventricular function is present. LVIDd: 6.3  cm. Severe diffuse hypokinesis is present. Biplane EF is 17%. LV apical  hypertrabeculation noted with no evidence of thrombus.  Grade III or advanced diastolic dysfunction.  Global right ventricular function is moderately reduced.  The right ventricle is normal size.There is moderate right ventricular  hypertrophy.  Severe biatrial enlargement is present.  Mild mitral insufficiency is present.  No pericardial effusion is present.  Previous study not available for comparison.     _____________________________________________________________________________  __        Left Ventricle  Left ventricular size is normal. EDV (BP) is normal at 131 ml. Mild eccentric  hypertrophy. Severely (EF 10-20%) reduced left ventricular function is  present. Grade III or advanced diastolic dysfunction. Biplane EF is 17%.  Severe diffuse hypokinesis is present.     Right Ventricle  The right ventricle is normal size. There is moderate right ventricular  hypertrophy. Global right ventricular function is moderately reduced. A  pacemaker lead is noted in the right ventricle.     Atria  Severe biatrial enlargement is present. The atrial septum is intact as  assessed by color Doppler .     Mitral Valve  Mild mitral annular calcification is present. Mild mitral insufficiency is  present.        Aortic Valve  Aortic valve is normal in structure and function. The aortic valve is  tricuspid.     Tricuspid Valve  Trace tricuspid insufficiency is present. The right ventricular  systolic  pressure is approximated at 11.2 mmHg plus the right atrial pressure.     Pulmonic Valve  The pulmonic valve is normal.     Vessels  The aorta root is normal. The inferior vena cava was normal in size with  preserved respiratory variability. The pulmonary artery and bifurcation cannot  be assessed. Estimated mean right atrial pressure is 3 mmHg (normal).     Pericardium  No pericardial effusion is present.        Compared to Previous Study  Previous study not available for comparison.  _____________________________________________________________________________  __  MMode/2D Measurements & Calculations     IVSd: 0.84 cm  LVIDd: 6.3 cm  LVIDs: 5.5 cm  LVPWd: 0.82 cm  FS: 12.9 %  LV mass(C)d: 215.4 grams  LV mass(C)dI: 125.3 grams/m2  MV Diam: 3.6 cm  Ao root diam: 3.1 cm  LA dimension: 5.7 cm  asc Aorta Diam: 3.1 cm  LA/Ao: 1.8  LVOT diam: 2.4 cm  LVOT area: 4.5 cm2  LA Volume (BP): 210.0 ml  LA Volume Index (BP): 122.1 ml/m2     RWT: 0.26        Doppler Measurements & Calculations  MV E max ata: 105.0 cm/sec  MV Flow area(1diam): 10.2 cm2  Ao V2 max: 68.4 cm/sec  Ao max P.0 mmHg  Ao V2 mean: 54.2 cm/sec  Ao mean P.0 mmHg  Ao V2 VTI: 12.0 cm  ALONZO(I,D): 2.6 cm2  ALONZO(V,D): 3.0 cm2  LV V1 max P.83 mmHg  LV V1 max: 45.5 cm/sec  LV V1 VTI: 6.8 cm  SV(LVOT): 30.7 ml  SI(LVOT): 17.8 ml/m2  PA acc time: 0.07 sec  TR max ata: 164.0 cm/sec  TR max P.2 mmHg  AV Ata Ratio (DI): 0.67  ALONZO Index (cm2/m2): 1.5        _____________________________________________________________________________  __        Report approved by: Kishor THOMPSON 2018 12:40 PM        Right Heart Cath:  Mean RA pressure of 12, mean PA pressure of 37, wedge of 28, cardiac output by thermal dilution of 2.4, with an index of 1.4.  Jaden cardiac output of 2.0, with an index of 1.1.  SVR was 18.8.      Echo  Severely (EF 10-20%) reduced left ventricular function is present. LVIDd: 6.3  cm. Severe diffuse hypokinesis is present.  Biplane EF is 17%. LV apical  hypertrabeculation noted with no evidence of thrombus.  Grade III or advanced diastolic dysfunction.  Global right ventricular function is moderately reduced.  The right ventricle is normal size.There is moderate right ventricular  hypertrophy.  Severe biatrial enlargement is present.  Mild mitral insufficiency is present.  No pericardial effusion is present.    Assessment and Plan:    In summary, Everton is a 53-year-old gentleman with nonischemic cardiomyopathy,  on milrinone, who is being considered for advanced therapies.   He appears euvolemic today and previous tox screen has been negative. He has had no issues with milrinone and is feeling significantly better though not back at baseline.        1.  Chronic combined systolic and diastolic heart failure secondary to NICM.  Stage D  NYHA Class IIIA  ACEi/ARB:  None. No BP room for addition of vasodilators , currently on milrinone  BB: Deferred as he is on inotropes.  Aldosterone antagonist: will start low dose spironolactone K today 3.4, 12.5mg   SCD prophylaxis: CRT-D  BiV Pacing:  AP =60%.   = 59%.  BVP = 51%  Fluid status: Euvolemic today  Anticoagulation: Warfarin with INR goal of 2-3.- will recheck INR today.  Antiplatelet:  ASA dose none.  NSAID use:  Contraindicated.  Avoid use.  Remote Monitoring: None.     If he remains stable, compliant and with negative tox screens will try to get him to surgery in Feb.     2.  Atrial Fibrillation:  Chads Vasc score:  1.   Continue warfarin with an INR goal of 2-3.    Continue amiodarone with routine amiodarone screening per protocol.    3.  Alcohol abuse:  Currently abstinent.  Last test 12/19/18 negative, included  PEth at core clinic negative.  Will recheck in a week    Patient seen and discussed with Dr. Donis    We will see him again in 4 weeks.     Sena Cam  Cardiology fellow, PGY-5      I have reviewed today's vital signs, notes, medications, labs and imaging. I  have also seen and examined the patient and agree with the findings and plan as outlined above.    Corinna Donis MD  Section Head - Advanced Heart Failure, Transplantation and Mechanical Circulatory Support  Co-Director - Adult Congenital and Cardiovascular Genetics Center  Associate Professor of Medicine, [facility]

## 2019-01-02 NOTE — PATIENT INSTRUCTIONS
Cardiology Providers you saw during your visit:  Dr. Donis    Medication changes:  Please start Spironolactone 12.5 mg by mouth once daily.    Follow up:  Blood work today INR and TSH.  Return to clinic with Dawna on 1/14/19.  Please return to clinic for follow-up:  With Dr. Donis on January 30th 2019; on the same day you will see the  nad VAD coordinator.      Please call if you have:  1. Weight gain of more than 2 pounds in a day or 5 pounds in a week  2. Increased shortness of breath, swelling or bloating  3. Dizziness, lightheadedness   4. Any questions or concerns.     Follow the American Heart Association Diet and Lifestyle recommendations:  Limit saturated fat, trans fat, sodium, red meat, sweets and sugar-sweetened beverages. If you choose to eat red meat, compare labels and select the leanest cuts available.  Aim for at least 150 minutes of moderate physical activity or 75 minutes of vigorous physical activity - or an equal combination of both - each week.    During business hours: [phone], press option # 1 to be routed to the [facility] then option # 3 for medical questions to speak with a nurse     After hours, weekends or holidays: On Call Cardiologist- [phone]   option #4 and ask to speak to the on-call Cardiologist. Inform them you are a CORE/heart failure patient at the [facility].      Glory Worthy, RN  Cardiology Nurse Care Coordinator    Keep up the good work!    Take Care!

## 2019-01-02 NOTE — NURSING NOTE
Diet: Patient instructed regarding a heart healthy diet, including discussion of reduced fat and sodium intake. Patient demonstrated understanding of this information and agreed to call with further questions or concerns.    Labs: Patient was given results of the laboratory testing obtained today. Patient was instructed to get laboratory testing today. Patient demonstrated understanding of this information and agreed to call with further questions or concerns.     Med Reconcile: Reviewed and verified all current medications with the patient. The updated medication list was printed and given to the patient.    New Medication: Patient was educated regarding Spironolactone newly prescribed medication, including discussion of  the indication, administration, side effects, and when to report to MD or RN. Patient demonstrated understanding of this information and agreed to call with further questions or concerns.    Return Appointment: Patient given instructions regarding scheduling next clinic visit. Patient demonstrated understanding of this information and agreed to call with further questions or concerns. January 30th at noon.    Patient stated he understood all health information given and agreed to call with further questions or concerns.

## 2019-01-02 NOTE — NURSING NOTE
Chief Complaint   Patient presents with     New Patient     Systolic HF     Vitals were taken and medications were reconciled.     Kushal Cruz, JULISA  7:55 AM

## 2019-01-07 ENCOUNTER — CARE COORDINATION (OUTPATIENT)
Dept: CARDIOLOGY | Facility: CLINIC | Age: 54
End: 2019-01-07

## 2019-01-07 DIAGNOSIS — I67.848 OTHER CEREBROVASCULAR VASOSPASM AND VASOCONSTRICTION: ICD-10-CM

## 2019-01-07 DIAGNOSIS — I50.22 CHRONIC SYSTOLIC HEART FAILURE (H): Primary | ICD-10-CM

## 2019-01-10 ENCOUNTER — COMMUNICATION - HEALTHEAST (OUTPATIENT)
Dept: INTERNAL MEDICINE | Facility: CLINIC | Age: 54
End: 2019-01-10

## 2019-01-10 ENCOUNTER — CARE COORDINATION (OUTPATIENT)
Dept: CARDIOLOGY | Facility: CLINIC | Age: 54
End: 2019-01-10

## 2019-01-10 ENCOUNTER — TRANSFERRED RECORDS (OUTPATIENT)
Dept: HEALTH INFORMATION MANAGEMENT | Facility: CLINIC | Age: 54
End: 2019-01-10

## 2019-01-10 DIAGNOSIS — I50.22 CHRONIC SYSTOLIC HEART FAILURE (H): Primary | Chronic | ICD-10-CM

## 2019-01-10 DIAGNOSIS — I50.22 CHRONIC SYSTOLIC HEART FAILURE (H): Chronic | ICD-10-CM

## 2019-01-10 DIAGNOSIS — F10.11 ALCOHOL ABUSE, IN REMISSION: ICD-10-CM

## 2019-01-10 LAB — MISCELLANEOUS TEST: NORMAL

## 2019-01-10 PROCEDURE — 36415 COLL VENOUS BLD VENIPUNCTURE: CPT | Performed by: INTERNAL MEDICINE

## 2019-01-10 PROCEDURE — 80321 ALCOHOLS BIOMARKERS 1OR 2: CPT | Performed by: INTERNAL MEDICINE

## 2019-01-10 PROCEDURE — 80307 DRUG TEST PRSMV CHEM ANLYZR: CPT | Performed by: INTERNAL MEDICINE

## 2019-01-10 NOTE — PROGRESS NOTES
Called Everton to discuss TSH results and communicate Dr. Donis's recommendations to follow up with PC or endocrinology.    I also instructed patient to get random drug screening tomorrow locally. Orders entered.

## 2019-01-11 LAB
ACETAMINOPHEN QUAL: NEGATIVE
AMOBARBITAL QUAL: NEGATIVE
BARBITAL QUAL: NEGATIVE
BUTABARBITAL QUAL: NEGATIVE
BUTALBITAL QUAL: NEGATIVE
CAFFEINE QUAL: POSITIVE
CARBAMAZEPINE QUAL: NEGATIVE
CARISOPRODOL QUAL: NEGATIVE
CHLORPROPAMIDE UR-MCNC: NEGATIVE UG/ML
DRUGS SERPL SCN: NEGATIVE
ETHCLORVYNOL QUAL: NEGATIVE
ETHINAMATE QUAL: NEGATIVE
ETHOSUXIMIDE QUAL: NEGATIVE
ETHOTOIN QUAL: NEGATIVE
GLUTETHIMIDE QUAL: NEGATIVE
IBUPROFEN QUAL: NEGATIVE
MEPHENYTOIN QUAL: NEGATIVE
MEPHOBARBITAL QUAL: NEGATIVE
MEPROBAMATE QUAL: NEGATIVE
METHAQUALONE QUAL: NEGATIVE
METHARBITAL QUAL: NEGATIVE
METHSUXIMIDE QUAL: NEGATIVE
METHYPRYLON QUAL: NEGATIVE
PENTOBARBITAL QUAL: NEGATIVE
PHENACETIN QUAL: NEGATIVE
PHENOBARBITAL QUAL: NEGATIVE
PHENSUXIMIDE QUAL: NEGATIVE
PHENYTOIN QUAL: NEGATIVE
PRIMIDONE QUAL: NEGATIVE
SALICYLATE QUAL: NEGATIVE
SECOBARBITAL QUAL: NEGATIVE
TALBUTAL QUAL: NEGATIVE
THEOPHYLLINE QUAL: NEGATIVE
THIOPENTAL QUAL: NEGATIVE
TYBAMATE QUAL: NEGATIVE
VALPROIC ACID QUAL: NEGATIVE

## 2019-01-14 ENCOUNTER — ANCILLARY PROCEDURE (OUTPATIENT)
Dept: ULTRASOUND IMAGING | Facility: CLINIC | Age: 54
End: 2019-01-14
Payer: COMMERCIAL

## 2019-01-14 ENCOUNTER — OFFICE VISIT (OUTPATIENT)
Dept: CARDIOLOGY | Facility: CLINIC | Age: 54
End: 2019-01-14
Attending: NURSE PRACTITIONER
Payer: COMMERCIAL

## 2019-01-14 ENCOUNTER — RECORDS - HEALTHEAST (OUTPATIENT)
Dept: ADMINISTRATIVE | Facility: OTHER | Age: 54
End: 2019-01-14

## 2019-01-14 VITALS
DIASTOLIC BLOOD PRESSURE: 66 MMHG | OXYGEN SATURATION: 94 % | BODY MASS INDEX: 21.04 KG/M2 | HEIGHT: 68 IN | HEART RATE: 85 BPM | WEIGHT: 138.8 LBS | SYSTOLIC BLOOD PRESSURE: 100 MMHG

## 2019-01-14 DIAGNOSIS — I50.22 CHRONIC SYSTOLIC HEART FAILURE (H): Chronic | ICD-10-CM

## 2019-01-14 DIAGNOSIS — I42.8 NONISCHEMIC CARDIOMYOPATHY (H): Primary | Chronic | ICD-10-CM

## 2019-01-14 DIAGNOSIS — I67.848 OTHER CEREBROVASCULAR VASOSPASM AND VASOCONSTRICTION: ICD-10-CM

## 2019-01-14 DIAGNOSIS — I50.22 CHRONIC SYSTOLIC HEART FAILURE (H): ICD-10-CM

## 2019-01-14 DIAGNOSIS — I42.8 NONISCHEMIC CARDIOMYOPATHY (H): Chronic | ICD-10-CM

## 2019-01-14 LAB
ANION GAP SERPL CALCULATED.3IONS-SCNC: 7 MMOL/L (ref 3–14)
BUN SERPL-MCNC: 17 MG/DL (ref 7–30)
CALCIUM SERPL-MCNC: 8.7 MG/DL (ref 8.5–10.1)
CHLORIDE SERPL-SCNC: 98 MMOL/L (ref 94–109)
CO2 SERPL-SCNC: 24 MMOL/L (ref 20–32)
CREAT SERPL-MCNC: 0.94 MG/DL (ref 0.66–1.25)
GFR SERPL CREATININE-BSD FRML MDRD: 90 ML/MIN/{1.73_M2}
GLUCOSE SERPL-MCNC: 107 MG/DL (ref 70–99)
POTASSIUM SERPL-SCNC: 4.5 MMOL/L (ref 3.4–5.3)
SODIUM SERPL-SCNC: 129 MMOL/L (ref 133–144)

## 2019-01-14 PROCEDURE — G0463 HOSPITAL OUTPT CLINIC VISIT: HCPCS | Mod: ZF

## 2019-01-14 PROCEDURE — 80048 BASIC METABOLIC PNL TOTAL CA: CPT | Performed by: NURSE PRACTITIONER

## 2019-01-14 PROCEDURE — 99214 OFFICE O/P EST MOD 30 MIN: CPT | Mod: ZP | Performed by: NURSE PRACTITIONER

## 2019-01-14 PROCEDURE — 36415 COLL VENOUS BLD VENIPUNCTURE: CPT | Performed by: NURSE PRACTITIONER

## 2019-01-14 RX ORDER — SPIRONOLACTONE 25 MG/1
12.5 TABLET ORAL
Qty: 45 TABLET | Refills: 3 | Status: SHIPPED | OUTPATIENT
Start: 2019-01-14 | End: 2019-01-30

## 2019-01-14 ASSESSMENT — PAIN SCALES - GENERAL: PAINLEVEL: NO PAIN (0)

## 2019-01-14 ASSESSMENT — MIFFLIN-ST. JEOR: SCORE: 1439.09

## 2019-01-14 NOTE — PATIENT INSTRUCTIONS
"You were seen today in the Cardiovascular Clinic at the [facility].     Cardiology Providers you saw during your visit: Dawna Sutton NP     Follow up and medication changes:  1. Please reduce spironolactone to 12.5 mg every other day    2. Please have labs drawn this Friday    3. Please return to see Dr. Donis in 2 weeks        Results for ANA ROSA BALDWIN (MRN [id number]) as of 2019 12:44   Ref. Range 2019 12:12   Sodium Latest Ref Range: 133 - 144 mmol/L 129 (L)   Potassium Latest Ref Range: 3.4 - 5.3 mmol/L 4.5   Chloride Latest Ref Range: 94 - 109 mmol/L 98   Carbon Dioxide Latest Ref Range: 20 - 32 mmol/L 24   Urea Nitrogen Latest Ref Range: 7 - 30 mg/dL 17   Creatinine Latest Ref Range: 0.66 - 1.25 mg/dL 0.94   GFR Estimate Latest Ref Range: >60 mL/min/1.73_m2 90   GFR Estimate If Black Latest Ref Range: >60 mL/min/1.73_m2 >90   Calcium Latest Ref Range: 8.5 - 10.1 mg/dL 8.7   Anion Gap Latest Ref Range: 3 - 14 mmol/L 7   Glucose Latest Ref Range: 70 - 99 mg/dL 107 (H)       Please limit your fluid intake to 2 L (68 ounces) daily.  2 Liters a day = 8.5 cups, or 68 ounces.  Please limit your salt intake to 2 grams a day or less.     If you gain 2# in 24 hours or 5# in one week call Prema Alba RN so we can adjust your medications as needed over the phone.     Please feel free to call me with any questions or concerns.       Prema Alba RN  [facility]  Cardiology Care Coordinator-Heart Failure Clinic     Questions and [phone].   First press #1 for the [facility] and then press #3 for \"Medical Questions\" to reach us Cardiology Nurses.      On Call Cardiologist for after hours or on weekends: [phone]   option #4 and ask to speak to the on-call Cardiologist. Inform them you are a CORE/heart failure patient at the [facility].        If you need a medication refill please contact your pharmacy.  Please allow 3 business days for your refill to " be completed.  _______________________________________________________  [facility] Cardiomyopathy, Optimization, Rehabilitation, Education   The [facility]IN[facility] is a heart failure specialty clinic within the [facility] where you will work with specialized nurse practitioners dedicated to helping patients with heart failure carefully adjust medications, receive education, and learn who and when to call if symptoms develop. They specialize in helping you better understand your condition, slow the progression of your disease, improve the length and quality of your life, help you detect future heart problems before they become life threatening, and avoid hospitalizations.  As always, thank you for trusting us with your health care needs!

## 2019-01-14 NOTE — PROGRESS NOTES
HPI:   Everton is a 53-year-old gentleman with a past medical history of atrial fibrillation s/p atrial fibrillation ablation, nonischemic cardiomyopathy with an EF of 15%, diastolic dysfunction, congenital ASD repair in childhood, s/p AICD placement in 2008 with subsequent generator change in 2018, PAF, hypothyroidism, alcohol abuse, remote history of GI bleed and splenic embolization, and a recent admission in 11/18 for ambulatory cardiogenic shock requiring initiation of IV inotropes. He was discharged to home and seen in clinic 2 weeks ago with Dr. Donis when he was tolerating milrinone and was euvolemic after a recent increase of his diuretics. If he remains abstinent of alcohol, he will likely undergo LVAD implant in Feb.     Everton complains of fatigue with minimal exertion and unrelieved with rest. He is short of breath walking in the building today. He has been pacing himself to avoid dyspnea. Endorses bloating but feels his appetite has improved. No early satiety. No orthopnea, PND, chest pain, palpitations, lightheadedness, syncope, lower extremity edema.     PICC is without redness, tenderness. No fevers. No infusion pump issues.     PAST MEDICAL HISTORY:  Past Medical History:   Diagnosis Date     Alcohol abuse      Pierce's esophagus 10/4/2018     Chronic rhinitis 10/4/2018     Chronic systolic heart failure (H) 10/4/2018     Depression 10/4/2018     Diastolic dysfunction      DJD (degenerative joint disease) - neck 10/4/2018     H/O congenital atrial septal defect (ASD) repair at age 3 10/4/2018     Hypothyroidism due to medication 10/4/2018     ICD, [employer] 2008; gen change 2/2018 10/4/2018     Intermittent asthma without complication 10/4/2018     Nonischemic cardiomyopathy (H) 10/4/2018     On amiodarone therapy 10/4/2018     Paroxysmal atrial fibrillation (H) 10/4/2018     S/P ablation of atrial fibrillation 10/4/2018     Systolic heart failure (H)      Ulcerative colitis (H) 10/4/2018      Ventricular tachycardia (H) 10/4/2018       FAMILY HISTORY:  No family history on file.    SOCIAL HISTORY:  Social History     Social History     Marital status: Single     Spouse name: N/A     Number of children: N/A     Years of education: N/A     Social History Main Topics     Smoking status: Former Smoker     Years: 10.00     Smokeless tobacco: Never Used     Alcohol use 0.6 oz/week     1 Cans of beer per week      Comment: a couple times a week      Drug use: No     Sexual activity: Not Asked     Other Topics Concern     None     Social History Narrative       CURRENT MEDICATIONS:   Current Outpatient Medications on File Prior to Visit:  albuterol (PROAIR HFA/PROVENTIL HFA/VENTOLIN HFA) 108 (90 Base) MCG/ACT inhaler Inhale 2 puffs into the lungs every 6 hours as needed for shortness of breath / dyspnea or wheezing   Alpha-D-Galactosidase (BEANO PO) Take 2 tablets by mouth 3 times daily    amiodarone (PACERONE/CODARONE) 200 MG tablet Take 1 tablet (200 mg) by mouth daily   azelastine (ASTELIN) 0.1 % nasal spray Spray 2 sprays into both nostrils 2 times daily   BETA BLOCKER NOT PRESCRIBED, INTENTIONAL, Beta Blocker not prescribed intentionally due to Recent tx with IV positive inotropic agent   DULoxetine (CYMBALTA) 20 MG EC capsule Take 20 mg by mouth daily   fluticasone (FLOVENT HFA) 220 MCG/ACT Inhaler Inhale 2 puffs into the lungs 2 times daily   furosemide (LASIX) 20 MG tablet Take 40 mg in the am and 20 mg in the afternoon.   levothyroxine (SYNTHROID/LEVOTHROID) 88 MCG tablet Take 88 mcg by mouth daily   mesalamine (ASACOL HD) 800 MG EC tablet Take 3 tablets (2,400 mg) by mouth 2 times daily   milrinone (PRIMACOR) infusion 200 mcg/mL PREMIX Inject 6.6375 mcg/min into the vein continuous   montelukast (SINGULAIR) 10 MG tablet Take 1 tablet (10 mg) by mouth At Bedtime   omeprazole (PRILOSEC) 40 MG capsule Take 1 capsule (40 mg) by mouth daily In pm   ondansetron (ZOFRAN-ODT) 4 MG ODT tab Take 1 tablet (4 mg)  "by mouth every 8 hours as needed for nausea   potassium chloride ER (K-DUR/KLOR-CON M) 20 MEQ CR tablet Take 40 mg in the am and 20 mg in the afternoon.   spironolactone (ALDACTONE) 25 MG tablet Take 0.5 tablets (12.5 mg) by mouth daily   tamsulosin (FLOMAX) 0.4 MG capsule Take 1 capsule (0.4 mg) by mouth daily (with dinner)   warfarin (COUMADIN) 5 MG tablet Take 2.5 mg on Mon/Wed/Fri and 5 mg on all other days of the week     No current facility-administered medications on file prior to visit.       ROS:  Constitutional: Denies fever, chills, or fatigue.  Weight gain, now stable. Overall fatigue.  HEENT:  +Cough and rhinitis (chronic)  Cardiovascular:  See HPI  Pulmonary:  See HPI  GI:  No gi symptoms currently  :no urinary frequency, no dysuria, or hematuria  Endocrine:  +Hypothyroidism  Musculoskeletal: +muscle weakness and neck pain has improved notoriously  Neurological: Denies numbness or tingling.  Psychological: + depression  Skin:  +pruritis    EXAM:  /66 (BP Location: Left arm, Patient Position: Chair, Cuff Size: Adult Regular)   Pulse 85   Ht 1.727 m (5' 8\")   Wt 63 kg (138 lb 12.8 oz)   SpO2 94%   BMI 21.10 kg/m    General: alert and in no acute distress.  HEENT: normocephalic, atraumatic, anicteric sclera, EOMI, mucosa moist, no cyanosis.   Neck: neck supple.  No adenopathy, masses, or carotid bruits.  JVP flat.   Heart: regular rhythm, normal S1/S2 gallop, extrasystoles.  Precordium quiet with normal PMI.   2/6 murmur heard most prominent LSB  Lungs: Clear, no rales, ronchi, or wheezing.  No accessory muscle use, respirations unlabored.   Abdomen:non-tender, bowel sounds present, no hepatomegaly  Extremities: No pitting edema.   No cyanosis.  Warm.  2+ pedal pulses.   Neurological: Alert and oriented x 3.  Normal speech and affect, no gross motor deficits  Skin:  No ecchymoses, rashes, or clubbing.       Labs:  CBC RESULTS:  Lab Results   Component Value Date    WBC 7.2 11/23/2018    RBC " 5.36 2018    HGB 15.6 2018    HCT 46.8 2018    MCV 87 2018    MCH 29.1 2018    MCHC 33.3 2018    RDW 19.3 (H) 2018     2018       CMP RESULTS:  Lab Results   Component Value Date     (L) 2019    POTASSIUM 4.5 2019    CHLORIDE 98 2019    CO2 24 2019    ANIONGAP 7 2019     (H) 2019    BUN 17 2019    CR 0.94 2019    GFRESTIMATED 90 2019    GFRESTBLACK >90 2019    RADAMES 8.7 2019    BILITOTAL 1.9 (H) 2018    ALBUMIN 3.2 (L) 2018    ALKPHOS 180 (H) 2018    ALT 27 2018    AST 24 2018        INR RESULTS:  Lab Results   Component Value Date    INR 2.05 (H) 2019       No components found for: CK  Lab Results   Component Value Date    MAG 2.2 2018     Lab Results   Component Value Date    NTBNP 10,986 (H) 11/15/2018     @BRIEFLABR (dig)@    Most recent echocardiogram:  Recent Results (from the past 8760 hour(s))   ECHO COMPLETE WITH OPTISON    Narrative    [id number]  [id number]  [id number]  [id number]^RADHA^KIMBERLY^KEN           [facility],[place]  Echocardiography [facility]  [address] [place] [id number]     Name: ANA ROSA BALDWIN  MRN: [id number]  : 1965  Study Date: 2018 10:06 AM  Age: 53 yrs  Gender: Male  Patient Location: [facility]  Reason For Study: CHF  History: CHF  Ordering Physician: KIMBERLY GARCIA  Referring Physician: ANNABEL JI  Performed By: Cristel Mcclain RDCS     BSA: 1.7 m2  Height: 68 in  Weight: 133 lb  BP: 100/76 mmHg  _____________________________________________________________________________  __        Procedure  Complete Portable Echo Adult. Contrast Optison. Optison (NDC #3053-1891-65)  given intravenously. Patient was given 6 ml mixture of 3 ml Optison and 6 ml  saline. 3 ml wasted.  _____________________________________________________________________________  __         Interpretation Summary  Severely (EF 10-20%) reduced left ventricular function is present. LVIDd: 6.3  cm. Severe diffuse hypokinesis is present. Biplane EF is 17%. LV apical  hypertrabeculation noted with no evidence of thrombus.  Grade III or advanced diastolic dysfunction.  Global right ventricular function is moderately reduced.  The right ventricle is normal size.There is moderate right ventricular  hypertrophy.  Severe biatrial enlargement is present.  Mild mitral insufficiency is present.  No pericardial effusion is present.  Previous study not available for comparison.     _____________________________________________________________________________  __        Left Ventricle  Left ventricular size is normal. EDV (BP) is normal at 131 ml. Mild eccentric  hypertrophy. Severely (EF 10-20%) reduced left ventricular function is  present. Grade III or advanced diastolic dysfunction. Biplane EF is 17%.  Severe diffuse hypokinesis is present.     Right Ventricle  The right ventricle is normal size. There is moderate right ventricular  hypertrophy. Global right ventricular function is moderately reduced. A  pacemaker lead is noted in the right ventricle.     Atria  Severe biatrial enlargement is present. The atrial septum is intact as  assessed by color Doppler .     Mitral Valve  Mild mitral annular calcification is present. Mild mitral insufficiency is  present.        Aortic Valve  Aortic valve is normal in structure and function. The aortic valve is  tricuspid.     Tricuspid Valve  Trace tricuspid insufficiency is present. The right ventricular systolic  pressure is approximated at 11.2 mmHg plus the right atrial pressure.     Pulmonic Valve  The pulmonic valve is normal.     Vessels  The aorta root is normal. The inferior vena cava was normal in size with  preserved respiratory variability. The pulmonary artery and bifurcation cannot  be assessed. Estimated mean right atrial pressure is 3 mmHg (normal).      Pericardium  No pericardial effusion is present.        Compared to Previous Study  Previous study not available for comparison.  _____________________________________________________________________________  __  MMode/2D Measurements & Calculations     IVSd: 0.84 cm  LVIDd: 6.3 cm  LVIDs: 5.5 cm  LVPWd: 0.82 cm  FS: 12.9 %  LV mass(C)d: 215.4 grams  LV mass(C)dI: 125.3 grams/m2  MV Diam: 3.6 cm  Ao root diam: 3.1 cm  LA dimension: 5.7 cm  asc Aorta Diam: 3.1 cm  LA/Ao: 1.8  LVOT diam: 2.4 cm  LVOT area: 4.5 cm2  LA Volume (BP): 210.0 ml  LA Volume Index (BP): 122.1 ml/m2     RWT: 0.26        Doppler Measurements & Calculations  MV E max ata: 105.0 cm/sec  MV Flow area(1diam): 10.2 cm2  Ao V2 max: 68.4 cm/sec  Ao max P.0 mmHg  Ao V2 mean: 54.2 cm/sec  Ao mean P.0 mmHg  Ao V2 VTI: 12.0 cm  ALONZO(I,D): 2.6 cm2  ALONZO(V,D): 3.0 cm2  LV V1 max P.83 mmHg  LV V1 max: 45.5 cm/sec  LV V1 VTI: 6.8 cm  SV(LVOT): 30.7 ml  SI(LVOT): 17.8 ml/m2  PA acc time: 0.07 sec  TR max ata: 164.0 cm/sec  TR max P.2 mmHg  AV Ata Ratio (DI): 0.67  ALONZO Index (cm2/m2): 1.5        _____________________________________________________________________________  __        Report approved by: Kishor THOMPSON 2018 12:40 PM        Right Heart Cath:  Mean RA pressure of 12, mean PA pressure of 37, wedge of 28, cardiac output by thermal dilution of 2.4, with an index of 1.4.  Jaden cardiac output of 2.0, with an index of 1.1.  SVR was 18.8.      Echo  Severely (EF 10-20%) reduced left ventricular function is present. LVIDd: 6.3  cm. Severe diffuse hypokinesis is present. Biplane EF is 17%. LV apical  hypertrabeculation noted with no evidence of thrombus.  Grade III or advanced diastolic dysfunction.  Global right ventricular function is moderately reduced.  The right ventricle is normal size.There is moderate right ventricular  hypertrophy.  Severe biatrial enlargement is present.  Mild mitral insufficiency is present.  No  pericardial effusion is present.    Assessment and Plan:    In summary, Everton is a 53-year-old gentleman with nonischemic cardiomyopathy, on milrinone, who is being considered for advanced therapies.   Since adding spironolactone, he is newly hyponatremic though is having difficulty taking oral potassium. We'll decrease spironolactone to 12.5 mg every other day and repeat labs this Friday. Return to see Dr. Donis in 2 weeks.      1.  Chronic combined systolic and diastolic heart failure secondary to NICM.  Stage D  NYHA Class IIIA  ACEi/ARB:  None. No BP room for addition of vasodilators , currently on milrinone  BB: Deferred as he is on inotropes.  Aldosterone antagonist: cutting back spironolactone today to balance potassium and new hyponatremia  SCD prophylaxis: CRT-D  BiV Pacing:  AP =60%.   = 59%.  BVP = 51%  Fluid status: Euvolemic today  NSAID use:  Contraindicated.  Avoid use.  Remote Monitoring: None.     2.  Atrial Fibrillation:  Chads Vasc score:  1.   Continue warfarin with an INR goal of 2-3.    Continue amiodarone with routine amiodarone screening per protocol.    3.  Alcohol abuse:  Currently abstinent.  Last test 12/19/18 negative, included  PEth at [facility] negative. Drug screen on 1/10 was negative except for caffeine      25 minutes spent in direct care, >50% in counseling

## 2019-01-14 NOTE — NURSING NOTE
Chief Complaint   Patient presents with     Follow Up     reason for visit: Return CORE, 52 yo male, Systolic HF, EF 16%, labs prior.      Vitals were taken and medications were reconciled    JULISA Yao  12:29 PM

## 2019-01-14 NOTE — LETTER
1/14/2019      RE: Everton Larios  [address] [facility] [address]       Dear Colleague,    Thank you for the opportunity to participate in the care of your patient, Everton Larios, at the [facility] HEART [facility] at [facility]. Please see a copy of my visit note below.    HPI:   Everton is a 53-year-old gentleman with a past medical history of atrial fibrillation s/p atrial fibrillation ablation, nonischemic cardiomyopathy with an EF of 15%, diastolic dysfunction, congenital ASD repair in childhood, s/p AICD placement in 2008 with subsequent generator change in 2018, PAF, hypothyroidism, alcohol abuse, remote history of GI bleed and splenic embolization, and a recent admission in 11/18 for ambulatory cardiogenic shock requiring initiation of IV inotropes. He was discharged to home and seen in clinic 2 weeks ago with Dr. Donis when he was tolerating milrinone and was euvolemic after a recent increase of his diuretics. If he remains abstinent of alcohol, he will likely undergo LVAD implant in Feb.     Everton complains of fatigue with minimal exertion and unrelieved with rest. He is short of breath walking in the building today. He has been pacing himself to avoid dyspnea. Endorses bloating but feels his appetite has improved. No early satiety. No orthopnea, PND, chest pain, palpitations, lightheadedness, syncope, lower extremity edema.     PICC is without redness, tenderness. No fevers. No infusion pump issues.     PAST MEDICAL HISTORY:  Past Medical History:   Diagnosis Date     Alcohol abuse      Pierce's esophagus 10/4/2018     Chronic rhinitis 10/4/2018     Chronic systolic heart failure (H) 10/4/2018     Depression 10/4/2018     Diastolic dysfunction      DJD (degenerative joint disease) - neck 10/4/2018     H/O congenital atrial septal defect (ASD) repair at age 3 10/4/2018     Hypothyroidism due to medication 10/4/2018     ICD, [employer] 2008; gen change  2/2018 10/4/2018     Intermittent asthma without complication 10/4/2018     Nonischemic cardiomyopathy (H) 10/4/2018     On amiodarone therapy 10/4/2018     Paroxysmal atrial fibrillation (H) 10/4/2018     S/P ablation of atrial fibrillation 10/4/2018     Systolic heart failure (H)      Ulcerative colitis (H) 10/4/2018     Ventricular tachycardia (H) 10/4/2018       FAMILY HISTORY:  No family history on file.    SOCIAL HISTORY:  Social History     Social History     Marital status: Single     Spouse name: N/A     Number of children: N/A     Years of education: N/A     Social History Main Topics     Smoking status: Former Smoker     Years: 10.00     Smokeless tobacco: Never Used     Alcohol use 0.6 oz/week     1 Cans of beer per week      Comment: a couple times a week      Drug use: No     Sexual activity: Not Asked     Other Topics Concern     None     Social History Narrative       CURRENT MEDICATIONS:   Current Outpatient Medications on File Prior to Visit:  albuterol (PROAIR HFA/PROVENTIL HFA/VENTOLIN HFA) 108 (90 Base) MCG/ACT inhaler Inhale 2 puffs into the lungs every 6 hours as needed for shortness of breath / dyspnea or wheezing   Alpha-D-Galactosidase (BEANO PO) Take 2 tablets by mouth 3 times daily    amiodarone (PACERONE/CODARONE) 200 MG tablet Take 1 tablet (200 mg) by mouth daily   azelastine (ASTELIN) 0.1 % nasal spray Spray 2 sprays into both nostrils 2 times daily   BETA BLOCKER NOT PRESCRIBED, INTENTIONAL, Beta Blocker not prescribed intentionally due to Recent tx with IV positive inotropic agent   DULoxetine (CYMBALTA) 20 MG EC capsule Take 20 mg by mouth daily   fluticasone (FLOVENT HFA) 220 MCG/ACT Inhaler Inhale 2 puffs into the lungs 2 times daily   furosemide (LASIX) 20 MG tablet Take 40 mg in the am and 20 mg in the afternoon.   levothyroxine (SYNTHROID/LEVOTHROID) 88 MCG tablet Take 88 mcg by mouth daily   mesalamine (ASACOL HD) 800 MG EC tablet Take 3 tablets (2,400 mg) by mouth 2 times  "daily   milrinone (PRIMACOR) infusion 200 mcg/mL PREMIX Inject 6.6375 mcg/min into the vein continuous   montelukast (SINGULAIR) 10 MG tablet Take 1 tablet (10 mg) by mouth At Bedtime   omeprazole (PRILOSEC) 40 MG capsule Take 1 capsule (40 mg) by mouth daily In pm   ondansetron (ZOFRAN-ODT) 4 MG ODT tab Take 1 tablet (4 mg) by mouth every 8 hours as needed for nausea   potassium chloride ER (K-DUR/KLOR-CON M) 20 MEQ CR tablet Take 40 mg in the am and 20 mg in the afternoon.   spironolactone (ALDACTONE) 25 MG tablet Take 0.5 tablets (12.5 mg) by mouth daily   tamsulosin (FLOMAX) 0.4 MG capsule Take 1 capsule (0.4 mg) by mouth daily (with dinner)   warfarin (COUMADIN) 5 MG tablet Take 2.5 mg on Mon/Wed/Fri and 5 mg on all other days of the week     No current facility-administered medications on file prior to visit.       ROS:  Constitutional: Denies fever, chills, or fatigue.  Weight gain, now stable. Overall fatigue.  HEENT:  +Cough and rhinitis (chronic)  Cardiovascular:  See HPI  Pulmonary:  See HPI  GI:  No gi symptoms currently  :no urinary frequency, no dysuria, or hematuria  Endocrine:  +Hypothyroidism  Musculoskeletal: +muscle weakness and neck pain has improved notoriously  Neurological: Denies numbness or tingling.  Psychological: + depression  Skin:  +pruritis    EXAM:  /66 (BP Location: Left arm, Patient Position: Chair, Cuff Size: Adult Regular)   Pulse 85   Ht 1.727 m (5' 8\")   Wt 63 kg (138 lb 12.8 oz)   SpO2 94%   BMI 21.10 kg/m     General: alert and in no acute distress.  HEENT: normocephalic, atraumatic, anicteric sclera, EOMI, mucosa moist, no cyanosis.   Neck: neck supple.  No adenopathy, masses, or carotid bruits.  JVP flat.   Heart: regular rhythm, normal S1/S2 gallop, extrasystoles.  Precordium quiet with normal PMI.   2/6 murmur heard most prominent LSB  Lungs: Clear, no rales, ronchi, or wheezing.  No accessory muscle use, respirations unlabored.   Abdomen:non-tender, bowel " sounds present, no hepatomegaly  Extremities: No pitting edema.   No cyanosis.  Warm.  2+ pedal pulses.   Neurological: Alert and oriented x 3.  Normal speech and affect, no gross motor deficits  Skin:  No ecchymoses, rashes, or clubbing.       Labs:  CBC RESULTS:  Lab Results   Component Value Date    WBC 7.2 2018    RBC 5.36 2018    HGB 15.6 2018    HCT 46.8 2018    MCV 87 2018    MCH 29.1 2018    MCHC 33.3 2018    RDW 19.3 (H) 2018     2018       CMP RESULTS:  Lab Results   Component Value Date     (L) 2019    POTASSIUM 4.5 2019    CHLORIDE 98 2019    CO2 24 2019    ANIONGAP 7 2019     (H) 2019    BUN 17 2019    CR 0.94 2019    GFRESTIMATED 90 2019    GFRESTBLACK >90 2019    RADAMES 8.7 2019    BILITOTAL 1.9 (H) 2018    ALBUMIN 3.2 (L) 2018    ALKPHOS 180 (H) 2018    ALT 27 2018    AST 24 2018        INR RESULTS:  Lab Results   Component Value Date    INR 2.05 (H) 2019       No components found for: CK  Lab Results   Component Value Date    MAG 2.2 2018     Lab Results   Component Value Date    NTBNP 10,986 (H) 11/15/2018     @BRIEFLABR (dig)@    Most recent echocardiogram:  Recent Results (from the past 8760 hour(s))   ECHO COMPLETE WITH OPTISON    Narrative    [id number]  [id number]  [id number]  [id number]^RADHA^KIMBERLY^KEN           [facility],[place]  Echocardiography Laboratory  [address] [place] [id number]     Name: ANA ROSA BALDWIN  MRN: [id number]  : 1965  Study Date: 2018 10:06 AM  Age: 53 yrs  Gender: Male  Patient Location: [facility]  Reason For Study: CHF  History: CHF  Ordering Physician: KIMBERLY GARCIA  Referring Physician: ANNABEL JI  Performed By: Cristel Mcclain RDCS     BSA: 1.7 m2  Height: 68 in  Weight: 133 lb  BP: 100/76  mmHg  _____________________________________________________________________________  __        Procedure  Complete Portable Echo Adult. Contrast Optison. Optison (NDC #1652-4494-61)  given intravenously. Patient was given 6 ml mixture of 3 ml Optison and 6 ml  saline. 3 ml wasted.  _____________________________________________________________________________  __        Interpretation Summary  Severely (EF 10-20%) reduced left ventricular function is present. LVIDd: 6.3  cm. Severe diffuse hypokinesis is present. Biplane EF is 17%. LV apical  hypertrabeculation noted with no evidence of thrombus.  Grade III or advanced diastolic dysfunction.  Global right ventricular function is moderately reduced.  The right ventricle is normal size.There is moderate right ventricular  hypertrophy.  Severe biatrial enlargement is present.  Mild mitral insufficiency is present.  No pericardial effusion is present.  Previous study not available for comparison.     _____________________________________________________________________________  __        Left Ventricle  Left ventricular size is normal. EDV (BP) is normal at 131 ml. Mild eccentric  hypertrophy. Severely (EF 10-20%) reduced left ventricular function is  present. Grade III or advanced diastolic dysfunction. Biplane EF is 17%.  Severe diffuse hypokinesis is present.     Right Ventricle  The right ventricle is normal size. There is moderate right ventricular  hypertrophy. Global right ventricular function is moderately reduced. A  pacemaker lead is noted in the right ventricle.     Atria  Severe biatrial enlargement is present. The atrial septum is intact as  assessed by color Doppler .     Mitral Valve  Mild mitral annular calcification is present. Mild mitral insufficiency is  present.        Aortic Valve  Aortic valve is normal in structure and function. The aortic valve is  tricuspid.     Tricuspid Valve  Trace tricuspid insufficiency is present. The right ventricular  systolic  pressure is approximated at 11.2 mmHg plus the right atrial pressure.     Pulmonic Valve  The pulmonic valve is normal.     Vessels  The aorta root is normal. The inferior vena cava was normal in size with  preserved respiratory variability. The pulmonary artery and bifurcation cannot  be assessed. Estimated mean right atrial pressure is 3 mmHg (normal).     Pericardium  No pericardial effusion is present.        Compared to Previous Study  Previous study not available for comparison.  _____________________________________________________________________________  __  MMode/2D Measurements & Calculations     IVSd: 0.84 cm  LVIDd: 6.3 cm  LVIDs: 5.5 cm  LVPWd: 0.82 cm  FS: 12.9 %  LV mass(C)d: 215.4 grams  LV mass(C)dI: 125.3 grams/m2  MV Diam: 3.6 cm  Ao root diam: 3.1 cm  LA dimension: 5.7 cm  asc Aorta Diam: 3.1 cm  LA/Ao: 1.8  LVOT diam: 2.4 cm  LVOT area: 4.5 cm2  LA Volume (BP): 210.0 ml  LA Volume Index (BP): 122.1 ml/m2     RWT: 0.26        Doppler Measurements & Calculations  MV E max ata: 105.0 cm/sec  MV Flow area(1diam): 10.2 cm2  Ao V2 max: 68.4 cm/sec  Ao max P.0 mmHg  Ao V2 mean: 54.2 cm/sec  Ao mean P.0 mmHg  Ao V2 VTI: 12.0 cm  ALONZO(I,D): 2.6 cm2  ALONZO(V,D): 3.0 cm2  LV V1 max P.83 mmHg  LV V1 max: 45.5 cm/sec  LV V1 VTI: 6.8 cm  SV(LVOT): 30.7 ml  SI(LVOT): 17.8 ml/m2  PA acc time: 0.07 sec  TR max ata: 164.0 cm/sec  TR max P.2 mmHg  AV Ata Ratio (DI): 0.67  ALONZO Index (cm2/m2): 1.5        _____________________________________________________________________________  __        Report approved by: Kishor THOMPSON 2018 12:40 PM        Right Heart Cath:  Mean RA pressure of 12, mean PA pressure of 37, wedge of 28, cardiac output by thermal dilution of 2.4, with an index of 1.4.  Jaden cardiac output of 2.0, with an index of 1.1.  SVR was 18.8.      Echo  Severely (EF 10-20%) reduced left ventricular function is present. LVIDd: 6.3  cm. Severe diffuse hypokinesis is present.  Biplane EF is 17%. LV apical  hypertrabeculation noted with no evidence of thrombus.  Grade III or advanced diastolic dysfunction.  Global right ventricular function is moderately reduced.  The right ventricle is normal size.There is moderate right ventricular  hypertrophy.  Severe biatrial enlargement is present.  Mild mitral insufficiency is present.  No pericardial effusion is present.    Assessment and Plan:    In summary, Everton is a 53-year-old gentleman with nonischemic cardiomyopathy, on milrinone, who is being considered for advanced therapies.   Since adding spironolactone, he is newly hyponatremic though is having difficulty taking oral potassium. We'll decrease spironolactone to 12.5 mg every other day and repeat labs this Friday. Return to see Dr. Donis in 2 weeks.      1.  Chronic combined systolic and diastolic heart failure secondary to NICM.  Stage D  NYHA Class IIIA  ACEi/ARB:  None. No BP room for addition of vasodilators , currently on milrinone  BB: Deferred as he is on inotropes.  Aldosterone antagonist: cutting back spironolactone today to balance potassium and new hyponatremia  SCD prophylaxis: CRT-D  BiV Pacing:  AP =60%.   = 59%.  BVP = 51%  Fluid status: Euvolemic today  NSAID use:  Contraindicated.  Avoid use.  Remote Monitoring: None.     2.  Atrial Fibrillation:  Chads Vasc score:  1.   Continue warfarin with an INR goal of 2-3.    Continue amiodarone with routine amiodarone screening per protocol.    3.  Alcohol abuse:  Currently abstinent.  Last test 12/19/18 negative, included  PEth at [facility] negative. Drug screen on 1/10 was negative except for caffeine    25 minutes spent in direct care, >50% in counseling    Please do not hesitate to contact me if you have any questions/concerns.     Sincerely,     Dawna Sutton NP

## 2019-01-15 ENCOUNTER — COMMUNICATION - HEALTHEAST (OUTPATIENT)
Dept: INTERNAL MEDICINE | Facility: CLINIC | Age: 54
End: 2019-01-15

## 2019-01-15 DIAGNOSIS — E03.9 HYPOTHYROIDISM, UNSPECIFIED TYPE: ICD-10-CM

## 2019-01-17 ENCOUNTER — HOME INFUSION (PRE-WILLOW HOME INFUSION) (OUTPATIENT)
Dept: PHARMACY | Facility: CLINIC | Age: 54
End: 2019-01-17

## 2019-01-18 ENCOUNTER — COMMUNICATION - HEALTHEAST (OUTPATIENT)
Dept: INTERNAL MEDICINE | Facility: CLINIC | Age: 54
End: 2019-01-18

## 2019-01-18 DIAGNOSIS — I50.22 CHRONIC SYSTOLIC HEART FAILURE (H): Chronic | ICD-10-CM

## 2019-01-18 PROCEDURE — 36415 COLL VENOUS BLD VENIPUNCTURE: CPT | Performed by: NURSE PRACTITIONER

## 2019-01-18 PROCEDURE — 80048 BASIC METABOLIC PNL TOTAL CA: CPT | Performed by: NURSE PRACTITIONER

## 2019-01-18 NOTE — PROGRESS NOTES
This is a recent snapshot of the patient's [facility] medical record.  For current drug dose and complete information and questions, call [phone] or In Basket pool, [facility] home infusion ([id number])  CSN Number:  [id number]

## 2019-01-19 LAB
ANION GAP SERPL CALCULATED.3IONS-SCNC: 11 MMOL/L (ref 3–14)
BUN SERPL-MCNC: 18 MG/DL (ref 7–30)
CALCIUM SERPL-MCNC: 9.5 MG/DL (ref 8.5–10.1)
CHLORIDE SERPL-SCNC: 101 MMOL/L (ref 94–109)
CO2 SERPL-SCNC: 23 MMOL/L (ref 20–32)
CREAT SERPL-MCNC: 0.94 MG/DL (ref 0.66–1.25)
GFR SERPL CREATININE-BSD FRML MDRD: >90 ML/MIN/{1.73_M2}
GLUCOSE SERPL-MCNC: 92 MG/DL (ref 70–99)
POTASSIUM SERPL-SCNC: 4.4 MMOL/L (ref 3.4–5.3)
SODIUM SERPL-SCNC: 135 MMOL/L (ref 133–144)

## 2019-01-22 ENCOUNTER — COMMUNICATION - HEALTHEAST (OUTPATIENT)
Dept: INTERNAL MEDICINE | Facility: CLINIC | Age: 54
End: 2019-01-22

## 2019-01-24 ENCOUNTER — RECORDS - HEALTHEAST (OUTPATIENT)
Dept: ADMINISTRATIVE | Facility: OTHER | Age: 54
End: 2019-01-24

## 2019-01-25 LAB
LOCATION PERFORMED: NORMAL
NORMAL RANGE FOR SEND OUTS MISC TEST: NORMAL
RESULT: NORMAL
SEND OUTS MISC TEST CODE: 2804
SEND OUTS MISC TEST SPECIMEN: NORMAL
TEST NAME: NORMAL

## 2019-01-28 ENCOUNTER — CARE COORDINATION (OUTPATIENT)
Dept: CARDIOLOGY | Facility: CLINIC | Age: 54
End: 2019-01-28

## 2019-01-28 DIAGNOSIS — I42.8 NONISCHEMIC CARDIOMYOPATHY (H): Primary | Chronic | ICD-10-CM

## 2019-01-28 NOTE — PROGRESS NOTES
I called Everton today to check in with him. He is scheduled to have a Show and Tell again with his parents present on 1/30 as well as have a SW visit before his cards appointment. He confirmed that he and his parents want to meet with the VAD coordinator to talk about the pumps and ask their questions. They also have questions for social work.  Plan to do a VAD S&T on 1/30.

## 2019-01-29 NOTE — PROGRESS NOTES
HEART FAILURE CLINIC      HPI: Everton is a 53-year-old gentleman with a past medical history of atrial fibrillation s/p atrial fibrillation ablation, nonischemic cardiomyopathy with an EF of 15%, diastolic dysfunction, congenital ASD repair in childhood, s/p AICD placement in 2008 with subsequent generator change in 2018, PAF, hypothyroidism, alcohol abuse, remote history of GI bleed and splenic embolization, and a recent admission in 11/18 for ambulatory cardiogenic shock requiring initiation of IV inotropes, who returns to finalize pre LVAD workup    During his recent hospitalization he was initially started on IV afterload reduction and dobutamine. He was then transitioned to milrinone 0.125mcg/kg/min with great improvements in HD. He symptomatically felt much better. We last saw him on 1/2/19 , his repeat tox screen 1/10/19 including the PEth test was negative. Today he states he is slowly getting more short of breath with activity he and his parents saw the LVAD and are ready for the surgery.    Current cardiac meds  Amiodarone 200mg daily  milrinone 0.125mcg/kg/min   Lasix 40mg in the am 20mg in the pm  Kdur 40mEq int he am 20mEq in the evening  Coumadin     PAST MEDICAL HISTORY:  Past Medical History:   Diagnosis Date     Alcohol abuse      Pierce's esophagus 10/4/2018     Chronic rhinitis 10/4/2018     Chronic systolic heart failure (H) 10/4/2018     Depression 10/4/2018     Diastolic dysfunction      DJD (degenerative joint disease) - neck 10/4/2018     H/O congenital atrial septal defect (ASD) repair at age 3 10/4/2018     Hypothyroidism due to medication 10/4/2018     ICD, E[employer] 2008; gen change 2/2018 10/4/2018     Intermittent asthma without complication 10/4/2018     Nonischemic cardiomyopathy (H) 10/4/2018     On amiodarone therapy 10/4/2018     Paroxysmal atrial fibrillation (H) 10/4/2018     S/P ablation of atrial fibrillation 10/4/2018     Systolic heart failure (H)      Ulcerative  colitis (H) 10/4/2018     Ventricular tachycardia (H) 10/4/2018       FAMILY HISTORY:  No family history on file.    SOCIAL HISTORY:  Social History     Social History     Marital status: Single     Spouse name: N/A     Number of children: N/A     Years of education: N/A     Social History Main Topics     Smoking status: Former Smoker     Years: 10.00     Smokeless tobacco: Never Used     Alcohol use 0.6 oz/week     1 Cans of beer per week      Comment: a couple times a week      Drug use: No     Sexual activity: Not Asked     Other Topics Concern     None     Social History Narrative       CURRENT MEDICATIONS:  Current Outpatient Medications   Medication Sig Dispense Refill     albuterol (PROAIR HFA/PROVENTIL HFA/VENTOLIN HFA) 108 (90 Base) MCG/ACT inhaler Inhale 2 puffs into the lungs every 6 hours as needed for shortness of breath / dyspnea or wheezing       Alpha-D-Galactosidase (BEANO PO) Take 2 tablets by mouth 3 times daily        amiodarone (PACERONE/CODARONE) 200 MG tablet Take 1 tablet (200 mg) by mouth daily       azelastine (ASTELIN) 0.1 % nasal spray Spray 2 sprays into both nostrils 2 times daily       DULoxetine (CYMBALTA) 20 MG EC capsule Take 20 mg by mouth daily       fluticasone (FLOVENT HFA) 220 MCG/ACT Inhaler Inhale 2 puffs into the lungs 2 times daily       furosemide (LASIX) 20 MG tablet Take 2 tablets (40 mg) by mouth 2 times daily 360 tablet 3     levothyroxine (SYNTHROID/LEVOTHROID) 88 MCG tablet Take 100 mcg by mouth daily        mesalamine (ASACOL HD) 800 MG EC tablet Take 3 tablets (2,400 mg) by mouth 2 times daily       milrinone (PRIMACOR) infusion 200 mcg/mL PREMIX Inject 6.6375 mcg/min into the vein continuous 100 mL 3     montelukast (SINGULAIR) 10 MG tablet Take 1 tablet (10 mg) by mouth At Bedtime       omeprazole (PRILOSEC) 40 MG capsule Take 1 capsule (40 mg) by mouth daily In pm       ondansetron (ZOFRAN-ODT) 4 MG ODT tab Take 1 tablet (4 mg) by mouth every 8 hours as needed  "for nausea       potassium chloride ER (K-DUR/KLOR-CON M) 20 MEQ CR tablet Take 40 mg in the am and 20 mg in the afternoon. 180 tablet 3     spironolactone (ALDACTONE) 25 MG tablet Take 0.5 tablets (12.5 mg) by mouth daily 45 tablet 3     tamsulosin (FLOMAX) 0.4 MG capsule Take 1 capsule (0.4 mg) by mouth daily (with dinner)       warfarin (COUMADIN) 5 MG tablet Take 2.5 mg on Mon/Wed/Fri and 5 mg on all other days of the week       BETA BLOCKER NOT PRESCRIBED, INTENTIONAL, Beta Blocker not prescribed intentionally due to Recent tx with IV positive inotropic agent (Patient not taking: Reported on 1/30/2019)  0       ROS:  Constitutional: Denies fever, chills, or fatigue.  Weight gain, now stable. Overall fatigue.  HEENT:  +Cough and rhinitis (chronic)  Cardiovascular:  See HPI  Pulmonary:  See HPI  GI:  No gi symptoms currently  :no urinary frequency, no dysuria, or hematuria  Endocrine:  +Hypothyroidism  Musculoskeletal: +muscle weakness   Neurological: Denies numbness or tingling.  Psychological: + depression somewhat improved  Skin:  +pruritis    EXAM:  BP 99/74 (BP Location: Left arm, Patient Position: Chair, Cuff Size: Adult Regular)   Pulse 77   Ht 1.727 m (5' 8\")   Wt 65 kg (143 lb 4.8 oz)   SpO2 98%   BMI 21.79 kg/m    General: alert and in no acute distress.  HEENT: normocephalic, atraumatic, anicteric sclera, EOMI, mucosa moist, no cyanosis.   Neck: neck supple.  No adenopathy, masses, or carotid bruits.  JVP 12cm  Heart: regular rhythm, normal S1/S2 gallop, extrasystoles.  Precordium quiet with normal PMI.   2/6 murmur heard most prominent LSB  Lungs: Clear, no rales, ronchi, or wheezing.  No accessory muscle use, respirations unlabored.   Abdomen:non-tender, bowel sounds present, no hepatomegaly  Extremities: No pitting edema.   No cyanosis.  Warm.  2+ pedal pulses.   Neurological: Alert and oriented x 3.  Normal speech and affect, no gross motor deficits  Skin:  No ecchymoses, rashes, or " clubbing.       Labs:  CBC RESULTS:  Lab Results   Component Value Date    WBC 4.7 2019    RBC 4.85 2019    HGB 14.0 2019    HCT 44.4 2019    MCV 92 2019    MCH 28.9 2019    MCHC 31.5 2019    RDW 15.0 2019     2019       CMP RESULTS:  Lab Results   Component Value Date     2019    POTASSIUM 4.3 2019    CHLORIDE 104 2019    CO2 25 2019    ANIONGAP 7 2019    GLC 86 2019    BUN 13 2019    CR 0.96 2019    GFRESTIMATED 88 2019    GFRESTBLACK >90 2019    RADAMES 8.7 2019    BILITOTAL 1.9 (H) 2019    ALBUMIN 3.7 2019    ALKPHOS 187 (H) 2019    ALT 21 2019    AST 18 2019        INR RESULTS:  Lab Results   Component Value Date    INR 2.56 (H) 2019       No components found for: CK  Lab Results   Component Value Date    MAG 2.2 2018     Lab Results   Component Value Date    NTBNP 10,986 (H) 11/15/2018     @BRIEFLABR (dig)@    Most recent echocardiogram:  Recent Results (from the past 8760 hour(s))   ECHO COMPLETE WITH OPTISON    Narrative    [id number]  [id number]  [id number]  [id number]^RADHA^KIMBERLY^KEN           [facility],[place]  Echocardiography Laboratory  [address] [place] [id number]     Name: ANA ROSA BALDWIN  MRN: [id number]  : 1965  Study Date: 2018 10:06 AM  Age: 53 yrs  Gender: Male  Patient Location: [facility]  Reason For Study: CHF  History: CHF  Ordering Physician: KIMBERLY GARCIA  Referring Physician: ANNABEL JI  Performed By: Cristel Mcclain RDCS     BSA: 1.7 m2  Height: 68 in  Weight: 133 lb  BP: 100/76 mmHg  _____________________________________________________________________________  __        Procedure  Complete Portable Echo Adult. Contrast Optison. Optison (NDC #4671-8742-43)  given intravenously. Patient was given 6 ml mixture of 3 ml Optison and 6 ml  saline. 3 ml  wasted.  _____________________________________________________________________________  __        Interpretation Summary  Severely (EF 10-20%) reduced left ventricular function is present. LVIDd: 6.3  cm. Severe diffuse hypokinesis is present. Biplane EF is 17%. LV apical  hypertrabeculation noted with no evidence of thrombus.  Grade III or advanced diastolic dysfunction.  Global right ventricular function is moderately reduced.  The right ventricle is normal size.There is moderate right ventricular  hypertrophy.  Severe biatrial enlargement is present.  Mild mitral insufficiency is present.  No pericardial effusion is present.  Previous study not available for comparison.     _____________________________________________________________________________  __        Left Ventricle  Left ventricular size is normal. EDV (BP) is normal at 131 ml. Mild eccentric  hypertrophy. Severely (EF 10-20%) reduced left ventricular function is  present. Grade III or advanced diastolic dysfunction. Biplane EF is 17%.  Severe diffuse hypokinesis is present.     Right Ventricle  The right ventricle is normal size. There is moderate right ventricular  hypertrophy. Global right ventricular function is moderately reduced. A  pacemaker lead is noted in the right ventricle.     Atria  Severe biatrial enlargement is present. The atrial septum is intact as  assessed by color Doppler .     Mitral Valve  Mild mitral annular calcification is present. Mild mitral insufficiency is  present.        Aortic Valve  Aortic valve is normal in structure and function. The aortic valve is  tricuspid.     Tricuspid Valve  Trace tricuspid insufficiency is present. The right ventricular systolic  pressure is approximated at 11.2 mmHg plus the right atrial pressure.     Pulmonic Valve  The pulmonic valve is normal.     Vessels  The aorta root is normal. The inferior vena cava was normal in size with  preserved respiratory variability. The pulmonary artery and  bifurcation cannot  be assessed. Estimated mean right atrial pressure is 3 mmHg (normal).     Pericardium  No pericardial effusion is present.        Compared to Previous Study  Previous study not available for comparison.  _____________________________________________________________________________  __  MMode/2D Measurements & Calculations     IVSd: 0.84 cm  LVIDd: 6.3 cm  LVIDs: 5.5 cm  LVPWd: 0.82 cm  FS: 12.9 %  LV mass(C)d: 215.4 grams  LV mass(C)dI: 125.3 grams/m2  MV Diam: 3.6 cm  Ao root diam: 3.1 cm  LA dimension: 5.7 cm  asc Aorta Diam: 3.1 cm  LA/Ao: 1.8  LVOT diam: 2.4 cm  LVOT area: 4.5 cm2  LA Volume (BP): 210.0 ml  LA Volume Index (BP): 122.1 ml/m2     RWT: 0.26        Doppler Measurements & Calculations  MV E max ata: 105.0 cm/sec  MV Flow area(1diam): 10.2 cm2  Ao V2 max: 68.4 cm/sec  Ao max P.0 mmHg  Ao V2 mean: 54.2 cm/sec  Ao mean P.0 mmHg  Ao V2 VTI: 12.0 cm  ALONZO(I,D): 2.6 cm2  ALONZO(V,D): 3.0 cm2  LV V1 max P.83 mmHg  LV V1 max: 45.5 cm/sec  LV V1 VTI: 6.8 cm  SV(LVOT): 30.7 ml  SI(LVOT): 17.8 ml/m2  PA acc time: 0.07 sec  TR max ata: 164.0 cm/sec  TR max P.2 mmHg  AV Ata Ratio (DI): 0.67  ALONZO Index (cm2/m2): 1.5        _____________________________________________________________________________  __        Report approved by: Kishor THOMPSON 2018 12:40 PM        Right Heart Cath:  Mean RA pressure of 12, mean PA pressure of 37, wedge of 28, cardiac output by thermal dilution of 2.4, with an index of 1.4.  Jaden cardiac output of 2.0, with an index of 1.1.  SVR was 18.8.      Echo  Severely (EF 10-20%) reduced left ventricular function is present. LVIDd: 6.3  cm. Severe diffuse hypokinesis is present. Biplane EF is 17%. LV apical  hypertrabeculation noted with no evidence of thrombus.  Grade III or advanced diastolic dysfunction.  Global right ventricular function is moderately reduced.  The right ventricle is normal size.There is moderate right  ventricular  hypertrophy.  Severe biatrial enlargement is present.  Mild mitral insufficiency is present.  No pericardial effusion is present.    Assessment and Plan:     53-year-old gentleman with nonischemic cardiomyopathy,  on milrinone, who is being considered for advanced therapies.   He appears euvolemic today and previous tox screen has been negative. He has had no issues with milrinone and is feeling significantly better though not back at baseline and is here for his pre LVAD visit.        1.  Chronic combined systolic and diastolic heart failure secondary to NICM.  Stage D  NYHA Class IIIA  ACEi/ARB:  None. No BP room for addition of vasodilators , currently on milrinone  BB: Deferred as he is on inotropes.  Aldosterone antagonist: we will increase the 12.5mg spironolactone from every other day to daily dose  SCD prophylaxis: CRT-D  BiV Pacing:  AP =60%.   = 59%.  BVP = 51%  Fluid status:hypervolemic will increase the lasix from 40/20 to 40mg BID  Anticoagulation: Warfarin with INR goal of 2-3.INR ordered for today  Antiplatelet:  ASA dose none.  NSAID use:  Contraindicated.  Avoid use.  Remote Monitoring: None.     We will plan for LVAD in early Feb.    2.  Atrial Fibrillation:  Chads Vasc score:  1.   Continue warfarin with an INR goal of 2-3.    Continue amiodarone with routine amiodarone screening per protocol.    3.  Alcohol abuse:  Currently abstinent.  Last test 12/19/18 negative, included  PEth 1/19/19    Case seen and discussed with Dr. Donis who agrees with the above assessment and plan    Sena Cam  Cardiology fellow, PGY-5      I have reviewed today's vital signs, notes, medications, labs and imaging. I have also seen and examined the patient and agree with the findings and plan as outlined above.    Corinna Donis MD  Section Head - Advanced Heart Failure, Transplantation and Mechanical Circulatory Support  Director - Adult Congenital and Cardiovascular Genetics  [facility]  Associate Professor of Medicine, [facility]

## 2019-01-30 ENCOUNTER — CARE COORDINATION (OUTPATIENT)
Dept: CARDIOLOGY | Facility: CLINIC | Age: 54
End: 2019-01-30

## 2019-01-30 ENCOUNTER — ALLIED HEALTH/NURSE VISIT (OUTPATIENT)
Dept: TRANSPLANT | Facility: CLINIC | Age: 54
End: 2019-01-30
Attending: INTERNAL MEDICINE
Payer: COMMERCIAL

## 2019-01-30 ENCOUNTER — APPOINTMENT (OUTPATIENT)
Dept: CARDIOLOGY | Facility: CLINIC | Age: 54
End: 2019-01-30
Attending: INTERNAL MEDICINE
Payer: COMMERCIAL

## 2019-01-30 ENCOUNTER — ANCILLARY PROCEDURE (OUTPATIENT)
Dept: CARDIOLOGY | Facility: CLINIC | Age: 54
End: 2019-01-30
Payer: COMMERCIAL

## 2019-01-30 ENCOUNTER — RECORDS - HEALTHEAST (OUTPATIENT)
Dept: ADMINISTRATIVE | Facility: OTHER | Age: 54
End: 2019-01-30

## 2019-01-30 ENCOUNTER — COMMUNICATION - HEALTHEAST (OUTPATIENT)
Dept: ANTICOAGULATION | Facility: CLINIC | Age: 54
End: 2019-01-30

## 2019-01-30 VITALS
WEIGHT: 143.3 LBS | DIASTOLIC BLOOD PRESSURE: 74 MMHG | SYSTOLIC BLOOD PRESSURE: 99 MMHG | BODY MASS INDEX: 21.72 KG/M2 | OXYGEN SATURATION: 98 % | HEIGHT: 68 IN | HEART RATE: 77 BPM

## 2019-01-30 DIAGNOSIS — Q21.10 ASD (ATRIAL SEPTAL DEFECT): ICD-10-CM

## 2019-01-30 DIAGNOSIS — I48.92 ATRIAL FLUTTER (H): ICD-10-CM

## 2019-01-30 DIAGNOSIS — I42.8 NONISCHEMIC CARDIOMYOPATHY (H): Chronic | ICD-10-CM

## 2019-01-30 DIAGNOSIS — I48.91 ATRIAL FIBRILLATION (H): ICD-10-CM

## 2019-01-30 DIAGNOSIS — I50.22 CHRONIC SYSTOLIC CONGESTIVE HEART FAILURE (H): Primary | ICD-10-CM

## 2019-01-30 DIAGNOSIS — I50.22 CHRONIC SYSTOLIC HEART FAILURE (H): Chronic | ICD-10-CM

## 2019-01-30 DIAGNOSIS — I50.22 CHRONIC SYSTOLIC HEART FAILURE (H): ICD-10-CM

## 2019-01-30 LAB
ALBUMIN SERPL-MCNC: 3.7 G/DL (ref 3.4–5)
ALP SERPL-CCNC: 187 U/L (ref 40–150)
ALT SERPL W P-5'-P-CCNC: 21 U/L (ref 0–70)
ANION GAP SERPL CALCULATED.3IONS-SCNC: 7 MMOL/L (ref 3–14)
AST SERPL W P-5'-P-CCNC: 18 U/L (ref 0–45)
BASOPHILS # BLD AUTO: 0.1 10E9/L (ref 0–0.2)
BASOPHILS NFR BLD AUTO: 1.1 %
BILIRUB DIRECT SERPL-MCNC: 0.8 MG/DL (ref 0–0.2)
BILIRUB SERPL-MCNC: 1.9 MG/DL (ref 0.2–1.3)
BUN SERPL-MCNC: 13 MG/DL (ref 7–30)
CALCIUM SERPL-MCNC: 8.7 MG/DL (ref 8.5–10.1)
CHLORIDE SERPL-SCNC: 104 MMOL/L (ref 94–109)
CO2 SERPL-SCNC: 25 MMOL/L (ref 20–32)
CREAT SERPL-MCNC: 0.96 MG/DL (ref 0.66–1.25)
DIFFERENTIAL METHOD BLD: NORMAL
EOSINOPHIL # BLD AUTO: 0.2 10E9/L (ref 0–0.7)
EOSINOPHIL NFR BLD AUTO: 5.1 %
ERYTHROCYTE [DISTWIDTH] IN BLOOD BY AUTOMATED COUNT: 15 % (ref 10–15)
GFR SERPL CREATININE-BSD FRML MDRD: 88 ML/MIN/{1.73_M2}
GLUCOSE SERPL-MCNC: 86 MG/DL (ref 70–99)
HCT VFR BLD AUTO: 44.4 % (ref 40–53)
HGB BLD-MCNC: 14 G/DL (ref 13.3–17.7)
IMM GRANULOCYTES # BLD: 0 10E9/L (ref 0–0.4)
IMM GRANULOCYTES NFR BLD: 0.4 %
INR PPP: 2.56 (ref 0.86–1.14)
LYMPHOCYTES # BLD AUTO: 1.2 10E9/L (ref 0.8–5.3)
LYMPHOCYTES NFR BLD AUTO: 25.2 %
MCH RBC QN AUTO: 28.9 PG (ref 26.5–33)
MCHC RBC AUTO-ENTMCNC: 31.5 G/DL (ref 31.5–36.5)
MCV RBC AUTO: 92 FL (ref 78–100)
MONOCYTES # BLD AUTO: 0.4 10E9/L (ref 0–1.3)
MONOCYTES NFR BLD AUTO: 8.1 %
NEUTROPHILS # BLD AUTO: 2.8 10E9/L (ref 1.6–8.3)
NEUTROPHILS NFR BLD AUTO: 60.1 %
NRBC # BLD AUTO: 0 10*3/UL
NRBC BLD AUTO-RTO: 0 /100
PLATELET # BLD AUTO: 168 10E9/L (ref 150–450)
POTASSIUM SERPL-SCNC: 4.3 MMOL/L (ref 3.4–5.3)
PROT SERPL-MCNC: 7.7 G/DL (ref 6.8–8.8)
RBC # BLD AUTO: 4.85 10E12/L (ref 4.4–5.9)
SODIUM SERPL-SCNC: 135 MMOL/L (ref 133–144)
WBC # BLD AUTO: 4.7 10E9/L (ref 4–11)

## 2019-01-30 PROCEDURE — 99214 OFFICE O/P EST MOD 30 MIN: CPT | Mod: GC | Performed by: INTERNAL MEDICINE

## 2019-01-30 PROCEDURE — G0463 HOSPITAL OUTPT CLINIC VISIT: HCPCS | Mod: ZF

## 2019-01-30 PROCEDURE — 36415 COLL VENOUS BLD VENIPUNCTURE: CPT | Performed by: INTERNAL MEDICINE

## 2019-01-30 RX ORDER — SPIRONOLACTONE 25 MG/1
12.5 TABLET ORAL DAILY
Qty: 45 TABLET | Refills: 3 | Status: ON HOLD | OUTPATIENT
Start: 2019-01-30 | End: 2019-03-01

## 2019-01-30 RX ORDER — FUROSEMIDE 20 MG
40 TABLET ORAL 2 TIMES DAILY
Qty: 360 TABLET | Refills: 3 | Status: ON HOLD | OUTPATIENT
Start: 2019-01-30 | End: 2019-04-01

## 2019-01-30 ASSESSMENT — MIFFLIN-ST. JEOR: SCORE: 1459.5

## 2019-01-30 ASSESSMENT — PAIN SCALES - GENERAL: PAINLEVEL: NO PAIN (0)

## 2019-01-30 NOTE — PATIENT INSTRUCTIONS
Cardiology Providers you saw during your visit:  Dr. Donis    Medication changes:  Please INCREASE Furosemide to 40 mg by mouth twice daily  Please INCREASE Spironolactone to once daily ( from every other day)    Follow up:    1- blood work ( INR and CBC) today        Please call if you have:  1. Weight gain of more than 2 pounds in a day or 5 pounds in a week  2. Increased shortness of breath, swelling or bloating  3. Dizziness, lightheadedness   4. Any questions or concerns.     Follow the American Heart Association Diet and Lifestyle recommendations:  Limit saturated fat, trans fat, sodium, red meat, sweets and sugar-sweetened beverages. If you choose to eat red meat, compare labels and select the leanest cuts available.  Aim for at least 150 minutes of moderate physical activity or 75 minutes of vigorous physical activity - or an equal combination of both - each week.    During business hours: [phone], press option # 1 to be routed to the [facility] then option # 3 for medical questions to speak with a nurse     After hours, weekends or holidays: On Call Cardiologist- [phone]   option #4 and ask to speak to the on-call Cardiologist. Inform them you are a CORE/heart failure patient at the [facility].      Glory Worthy, RN  Cardiology Nurse Care Coordinator    Keep up the good work!    Take Care!

## 2019-01-30 NOTE — PATIENT INSTRUCTIONS
It was a pleasure to see you in clinic today.      Corinne Alberto, RN, MS, CCRN  Electrophysiology Nurse Clinician  [facility]    During Business Hours Please Call:  [phone]  After Hours Please Call:  [phone] - select option #4 and ask for job code [phone]

## 2019-01-30 NOTE — PROGRESS NOTES
"Met with patient, father Dk, and his mother Verito to present the [employer] and HeartMate 3 VADs as possible treatment option. This is the third time Everton has heard this presentation and the second tie for his parents.    Discussed patient and caregiver responsibilities before and after VAD implant.  Clarified the need for a caregiver to be present for education and to assist patient for at least first 30 days after a patient returns home. Patient's designated caregiver is Verito and Dk.     Discussed basic overview of VAD equipment, on going management, need for anticoagulation, regular dressing change, grounded three-pronged outlet and functioning telephone. Also discussed frequency of follow-up clinic appointments and the need to stay locally for at least 4 weeks.  Everton will stay with his parents for the first 30 days before moving back to his nearby apartment. Reviewed restrictions of having a VAD such as no swimming, bathing, boats, MRI's, or arc welding.     Provided and discussed the VAD educational brochures, information regarding the VAD and transplant support groups, information on INTERMACs registry, and \"VAD What You Should Know\" with additional details. Patient, Dk, and Verito signed \"VAD What You Should Know\" document during a previous Show and Tell. VAD coordinator contact information provided.  Encouraged patient, Dk, and Verito to call with questions.     After today's clinic visit we will plan to pursue a surgery date.      "

## 2019-01-30 NOTE — LETTER
1/30/2019      RE: Everton Larios  [address] [facility] [address]       Dear Colleague,    Thank you for the opportunity to participate in the care of your patient, Everton Larios, at the [facility] HEART [facility]. Please see a copy of my visit note below.      HEART FAILURE CLINIC      HPI: Everton is a 53-year-old gentleman with a past medical history of atrial fibrillation s/p atrial fibrillation ablation, nonischemic cardiomyopathy with an EF of 15%, diastolic dysfunction, congenital ASD repair in childhood, s/p AICD placement in 2008 with subsequent generator change in 2018, PAF, hypothyroidism, alcohol abuse, remote history of GI bleed and splenic embolization, and a recent admission in 11/18 for ambulatory cardiogenic shock requiring initiation of IV inotropes, who returns to finalize pre LVAD workup    During his recent hospitalization he was initially started on IV afterload reduction and dobutamine. He was then transitioned to milrinone 0.125mcg/kg/min with great improvements in HD. He symptomatically felt much better. We last saw him on 1/2/19 , his repeat tox screen 1/10/19 including the PEth test was negative. Today he states he is slowly getting more short of breath with activity he and his parents saw the LVAD and are ready for the surgery.    Current cardiac meds  Amiodarone 200mg daily  milrinone 0.125mcg/kg/min   Lasix 40mg in the am 20mg in the pm  Kdur 40mEq int he am 20mEq in the evening  Coumadin     PAST MEDICAL HISTORY:  Past Medical History:   Diagnosis Date     Alcohol abuse      Pierce's esophagus 10/4/2018     Chronic rhinitis 10/4/2018     Chronic systolic heart failure (H) 10/4/2018     Depression 10/4/2018     Diastolic dysfunction      DJD (degenerative joint disease) - neck 10/4/2018     H/O congenital atrial septal defect (ASD) repair at age 3 10/4/2018     Hypothyroidism due to medication 10/4/2018     ICD, Lamar  scientific 2008; gen change 2/2018 10/4/2018     Intermittent asthma without complication 10/4/2018     Nonischemic cardiomyopathy (H) 10/4/2018     On amiodarone therapy 10/4/2018     Paroxysmal atrial fibrillation (H) 10/4/2018     S/P ablation of atrial fibrillation 10/4/2018     Systolic heart failure (H)      Ulcerative colitis (H) 10/4/2018     Ventricular tachycardia (H) 10/4/2018       FAMILY HISTORY:  No family history on file.    SOCIAL HISTORY:  Social History     Social History     Marital status: Single     Spouse name: N/A     Number of children: N/A     Years of education: N/A     Social History Main Topics     Smoking status: Former Smoker     Years: 10.00     Smokeless tobacco: Never Used     Alcohol use 0.6 oz/week     1 Cans of beer per week      Comment: a couple times a week      Drug use: No     Sexual activity: Not Asked     Other Topics Concern     None     Social History Narrative       CURRENT MEDICATIONS:  Current Outpatient Medications   Medication Sig Dispense Refill     albuterol (PROAIR HFA/PROVENTIL HFA/VENTOLIN HFA) 108 (90 Base) MCG/ACT inhaler Inhale 2 puffs into the lungs every 6 hours as needed for shortness of breath / dyspnea or wheezing       Alpha-D-Galactosidase (BEANO PO) Take 2 tablets by mouth 3 times daily        amiodarone (PACERONE/CODARONE) 200 MG tablet Take 1 tablet (200 mg) by mouth daily       azelastine (ASTELIN) 0.1 % nasal spray Spray 2 sprays into both nostrils 2 times daily       DULoxetine (CYMBALTA) 20 MG EC capsule Take 20 mg by mouth daily       fluticasone (FLOVENT HFA) 220 MCG/ACT Inhaler Inhale 2 puffs into the lungs 2 times daily       furosemide (LASIX) 20 MG tablet Take 2 tablets (40 mg) by mouth 2 times daily 360 tablet 3     levothyroxine (SYNTHROID/LEVOTHROID) 88 MCG tablet Take 100 mcg by mouth daily        mesalamine (ASACOL HD) 800 MG EC tablet Take 3 tablets (2,400 mg) by mouth 2 times daily       milrinone (PRIMACOR) infusion 200 mcg/mL  "PREMIX Inject 6.6375 mcg/min into the vein continuous 100 mL 3     montelukast (SINGULAIR) 10 MG tablet Take 1 tablet (10 mg) by mouth At Bedtime       omeprazole (PRILOSEC) 40 MG capsule Take 1 capsule (40 mg) by mouth daily In pm       ondansetron (ZOFRAN-ODT) 4 MG ODT tab Take 1 tablet (4 mg) by mouth every 8 hours as needed for nausea       potassium chloride ER (K-DUR/KLOR-CON M) 20 MEQ CR tablet Take 40 mg in the am and 20 mg in the afternoon. 180 tablet 3     spironolactone (ALDACTONE) 25 MG tablet Take 0.5 tablets (12.5 mg) by mouth daily 45 tablet 3     tamsulosin (FLOMAX) 0.4 MG capsule Take 1 capsule (0.4 mg) by mouth daily (with dinner)       warfarin (COUMADIN) 5 MG tablet Take 2.5 mg on Mon/Wed/Fri and 5 mg on all other days of the week       BETA BLOCKER NOT PRESCRIBED, INTENTIONAL, Beta Blocker not prescribed intentionally due to Recent tx with IV positive inotropic agent (Patient not taking: Reported on 1/30/2019)  0       ROS:  Constitutional: Denies fever, chills, or fatigue.  Weight gain, now stable. Overall fatigue.  HEENT:  +Cough and rhinitis (chronic)  Cardiovascular:  See HPI  Pulmonary:  See HPI  GI:  No gi symptoms currently  :no urinary frequency, no dysuria, or hematuria  Endocrine:  +Hypothyroidism  Musculoskeletal: +muscle weakness   Neurological: Denies numbness or tingling.  Psychological: + depression somewhat improved  Skin:  +pruritis    EXAM:  BP 99/74 (BP Location: Left arm, Patient Position: Chair, Cuff Size: Adult Regular)   Pulse 77   Ht 1.727 m (5' 8\")   Wt 65 kg (143 lb 4.8 oz)   SpO2 98%   BMI 21.79 kg/m     General: alert and in no acute distress.  HEENT: normocephalic, atraumatic, anicteric sclera, EOMI, mucosa moist, no cyanosis.   Neck: neck supple.  No adenopathy, masses, or carotid bruits.  JVP 12cm  Heart: regular rhythm, normal S1/S2 gallop, extrasystoles.  Precordium quiet with normal PMI.   2/6 murmur heard most prominent LSB  Lungs: Clear, no rales, " ronchi, or wheezing.  No accessory muscle use, respirations unlabored.   Abdomen:non-tender, bowel sounds present, no hepatomegaly  Extremities: No pitting edema.   No cyanosis.  Warm.  2+ pedal pulses.   Neurological: Alert and oriented x 3.  Normal speech and affect, no gross motor deficits  Skin:  No ecchymoses, rashes, or clubbing.       Labs:  CBC RESULTS:  Lab Results   Component Value Date    WBC 4.7 2019    RBC 4.85 2019    HGB 14.0 2019    HCT 44.4 2019    MCV 92 2019    MCH 28.9 2019    MCHC 31.5 2019    RDW 15.0 2019     2019       CMP RESULTS:  Lab Results   Component Value Date     2019    POTASSIUM 4.3 2019    CHLORIDE 104 2019    CO2 25 2019    ANIONGAP 7 2019    GLC 86 2019    BUN 13 2019    CR 0.96 2019    GFRESTIMATED 88 2019    GFRESTBLACK >90 2019    RADAMES 8.7 2019    BILITOTAL 1.9 (H) 2019    ALBUMIN 3.7 2019    ALKPHOS 187 (H) 2019    ALT 21 2019    AST 18 2019        INR RESULTS:  Lab Results   Component Value Date    INR 2.56 (H) 2019       No components found for: CK  Lab Results   Component Value Date    MAG 2.2 2018     Lab Results   Component Value Date    NTBNP 10,986 (H) 11/15/2018     @BRIEFLABR (dig)@    Most recent echocardiogram:  Recent Results (from the past 8760 hour(s))   ECHO COMPLETE WITH OPTISON    Narrative    [id number]  [id number]  [id number]  [id number]^RADHA^KIMBERLY^KEN           [facility],[place]  Echocardiography Laboratory  [address] [place] [id number]     Name: ANA ROSA BALDWIN  MRN: [id number]  : 1965  Study Date: 2018 10:06 AM  Age: 53 yrs  Gender: Male  Patient Location: [facility]  Reason For Study: CHF  History: CHF  Ordering Physician: KIMBERLY GARCIA  Referring Physician: ANNABEL JI  Performed By: Cristel Mcclain RDCS     BSA: 1.7 m2  Height:  68 in  Weight: 133 lb  BP: 100/76 mmHg  _____________________________________________________________________________  __        Procedure  Complete Portable Echo Adult. Contrast Optison. Optison (NDC #7695-9739-07)  given intravenously. Patient was given 6 ml mixture of 3 ml Optison and 6 ml  saline. 3 ml wasted.  _____________________________________________________________________________  __        Interpretation Summary  Severely (EF 10-20%) reduced left ventricular function is present. LVIDd: 6.3  cm. Severe diffuse hypokinesis is present. Biplane EF is 17%. LV apical  hypertrabeculation noted with no evidence of thrombus.  Grade III or advanced diastolic dysfunction.  Global right ventricular function is moderately reduced.  The right ventricle is normal size.There is moderate right ventricular  hypertrophy.  Severe biatrial enlargement is present.  Mild mitral insufficiency is present.  No pericardial effusion is present.  Previous study not available for comparison.     _____________________________________________________________________________  __        Left Ventricle  Left ventricular size is normal. EDV (BP) is normal at 131 ml. Mild eccentric  hypertrophy. Severely (EF 10-20%) reduced left ventricular function is  present. Grade III or advanced diastolic dysfunction. Biplane EF is 17%.  Severe diffuse hypokinesis is present.     Right Ventricle  The right ventricle is normal size. There is moderate right ventricular  hypertrophy. Global right ventricular function is moderately reduced. A  pacemaker lead is noted in the right ventricle.     Atria  Severe biatrial enlargement is present. The atrial septum is intact as  assessed by color Doppler .     Mitral Valve  Mild mitral annular calcification is present. Mild mitral insufficiency is  present.        Aortic Valve  Aortic valve is normal in structure and function. The aortic valve is  tricuspid.     Tricuspid Valve  Trace tricuspid insufficiency is  present. The right ventricular systolic  pressure is approximated at 11.2 mmHg plus the right atrial pressure.     Pulmonic Valve  The pulmonic valve is normal.     Vessels  The aorta root is normal. The inferior vena cava was normal in size with  preserved respiratory variability. The pulmonary artery and bifurcation cannot  be assessed. Estimated mean right atrial pressure is 3 mmHg (normal).     Pericardium  No pericardial effusion is present.        Compared to Previous Study  Previous study not available for comparison.  _____________________________________________________________________________  __  MMode/2D Measurements & Calculations     IVSd: 0.84 cm  LVIDd: 6.3 cm  LVIDs: 5.5 cm  LVPWd: 0.82 cm  FS: 12.9 %  LV mass(C)d: 215.4 grams  LV mass(C)dI: 125.3 grams/m2  MV Diam: 3.6 cm  Ao root diam: 3.1 cm  LA dimension: 5.7 cm  asc Aorta Diam: 3.1 cm  LA/Ao: 1.8  LVOT diam: 2.4 cm  LVOT area: 4.5 cm2  LA Volume (BP): 210.0 ml  LA Volume Index (BP): 122.1 ml/m2     RWT: 0.26        Doppler Measurements & Calculations  MV E max ata: 105.0 cm/sec  MV Flow area(1diam): 10.2 cm2  Ao V2 max: 68.4 cm/sec  Ao max P.0 mmHg  Ao V2 mean: 54.2 cm/sec  Ao mean P.0 mmHg  Ao V2 VTI: 12.0 cm  ALONZO(I,D): 2.6 cm2  ALONZO(V,D): 3.0 cm2  LV V1 max P.83 mmHg  LV V1 max: 45.5 cm/sec  LV V1 VTI: 6.8 cm  SV(LVOT): 30.7 ml  SI(LVOT): 17.8 ml/m2  PA acc time: 0.07 sec  TR max ata: 164.0 cm/sec  TR max P.2 mmHg  AV Ata Ratio (DI): 0.67  ALONZO Index (cm2/m2): 1.5        _____________________________________________________________________________  __        Report approved by: Kishor THOMPSON 2018 12:40 PM        Right Heart Cath:  Mean RA pressure of 12, mean PA pressure of 37, wedge of 28, cardiac output by thermal dilution of 2.4, with an index of 1.4.  Jaden cardiac output of 2.0, with an index of 1.1.  SVR was 18.8.      Echo  Severely (EF 10-20%) reduced left ventricular function is present. LVIDd: 6.3  cm. Severe  diffuse hypokinesis is present. Biplane EF is 17%. LV apical  hypertrabeculation noted with no evidence of thrombus.  Grade III or advanced diastolic dysfunction.  Global right ventricular function is moderately reduced.  The right ventricle is normal size.There is moderate right ventricular  hypertrophy.  Severe biatrial enlargement is present.  Mild mitral insufficiency is present.  No pericardial effusion is present.    Assessment and Plan:     53-year-old gentleman with nonischemic cardiomyopathy,  on milrinone, who is being considered for advanced therapies.   He appears euvolemic today and previous tox screen has been negative. He has had no issues with milrinone and is feeling significantly better though not back at baseline and is here for his pre LVAD visit.        1.  Chronic combined systolic and diastolic heart failure secondary to NICM.  Stage D  NYHA Class IIIA  ACEi/ARB:  None. No BP room for addition of vasodilators , currently on milrinone  BB: Deferred as he is on inotropes.  Aldosterone antagonist: we will increase the 12.5mg spironolactone from every other day to daily dose  SCD prophylaxis: CRT-D  BiV Pacing:  AP =60%.   = 59%.  BVP = 51%  Fluid status:hypervolemic will increase the lasix from 40/20 to 40mg BID  Anticoagulation: Warfarin with INR goal of 2-3.INR ordered for today  Antiplatelet:  ASA dose none.  NSAID use:  Contraindicated.  Avoid use.  Remote Monitoring: None.     We will plan for LVAD in early Feb.    2.  Atrial Fibrillation:  Chads Vasc score:  1.   Continue warfarin with an INR goal of 2-3.    Continue amiodarone with routine amiodarone screening per protocol.    3.  Alcohol abuse:  Currently abstinent.  Last test 12/19/18 negative, included  PEth 1/19/19    Case seen and discussed with Dr. Donis who agrees with the above assessment and plan    Snea Cam  Cardiology fellow, PGY-5    I have reviewed today's vital signs, notes, medications, labs and imaging.  I have also seen and examined the patient and agree with the findings and plan as outlined above.      Corinna Donis MD

## 2019-01-30 NOTE — NURSING NOTE
Chief Complaint   Patient presents with     Follow Up     Systolic HF     Vitals were taken and medications were reconciled.    12:46 PM Anthony Lara, Student CMA

## 2019-01-31 LAB — INR PPP: 2.56 (ref 0.9–1.1)

## 2019-01-31 NOTE — PROGRESS NOTES
Patient Name: Everton Larios  : 1965  Age: 53 year old  MRN: [id number]  Date of Initial Social Work Evaluation: 2018    Patient has been going through an evaluation for LVAD. Seen by SW for full evaluation in 2018. Saw today to update psychosocial assessment.      Presenting Information   Living Situation: Lives alone in a Townhome with no stairs. He is still considering moving to another apartment within the same complex to save on rent.  If not local, plans for short term stay:  He and his parents live locally  Previous Functional Status: Independent with ADLs and able to drive. Had been working part-time at [employer], which has gotten very difficult for him and he has had to stop. He is able to maintain household chores.  Cultural/Language/Spiritual Considerations: None identified    Support System  Primary Support Person Mom and Dad  Other support:  None mentioned  Plan for support in immediate post-LVAD period: He will live with his parents in their house for the first 30 days and they will provide 24-hour care    Health Care Directive  Decision Maker: Patient  Alternate Decision Maker: Parents  Health Care Directive: Has been given blank copies and explained process of completion. Has not completed    Mental Health/Coping:   History of Mental Health: Denies MH issues currently, but has seen a therapist in the past and is taking a medication for mood  History of Chemical Health: Has a distant history of alcohol use, but cut back when he developed heart problems. Most recently, has quite alcohol all together as was recommended by his doctors.   Current status: N/A  Coping: Appears to have adequate coping skills  Services Needed/Recommended: None identified    Financial   Income: Social Security Disability ($1,737/month). He was also receiving additional income from his part-time job, but hasn't been able to do this and is worried about monthly bills/expenses. He asked about  supplemental programs or grants available  Impact of LVAD on income: May continue to limit income as he won't be able to work for a while post-surgery  Insurance and medication coverage: Medicare and [employer]  Financial concerns: Yes. Doesn't appear as though parents are able to help financially. Should be able to afford rent ($950) month and additional monthly bills, but will probably experience a tight budget. He is interested in applying for [employer] to help with rent/electricity during his LVAD stay in the hospital and recovery.  Resources needed: [place]    Assessment and recommendations and plan:  Reviewed LVAD psychosocial issues such as average length of stay in hospital after surgery, potential need for rehabilitation, psych/family adjustment, need for 24-hour caregiver plan post-VAD as well as psychosocial risks of LVAD. Patient seemed to process information well. Appeared well informed, motivated, and able to follow post VAD requirements. Behavior was appropriate during interview. Has adequate income and insurance coverage. Adequate caregiver plan. No major contraindications noted for LVAD. At this time, patient appears to understand the risks and benefits of LVAD. No currently MH issues or chemical dependency issues appear problematic at this time. Acceptable candidate for LVAD.

## 2019-02-03 ENCOUNTER — HOME INFUSION (PRE-WILLOW HOME INFUSION) (OUTPATIENT)
Dept: PHARMACY | Facility: CLINIC | Age: 54
End: 2019-02-03

## 2019-02-04 LAB
MDC_IDC_LEAD_IMPLANT_DT: NORMAL
MDC_IDC_LEAD_LOCATION: NORMAL
MDC_IDC_LEAD_LOCATION_DETAIL_1: NORMAL
MDC_IDC_LEAD_MFG: NORMAL
MDC_IDC_LEAD_MODEL: NORMAL
MDC_IDC_LEAD_POLARITY_TYPE: NORMAL
MDC_IDC_LEAD_SERIAL: NORMAL
MDC_IDC_LEAD_SPECIAL_FUNCTION: NORMAL
MDC_IDC_MSMT_BATTERY_REMAINING_LONGEVITY: 96 MO
MDC_IDC_MSMT_BATTERY_STATUS: NORMAL
MDC_IDC_MSMT_CAP_CHARGE_DTM: NORMAL
MDC_IDC_MSMT_CAP_CHARGE_ENERGY: 0 J
MDC_IDC_MSMT_CAP_CHARGE_TIME: 0 S
MDC_IDC_MSMT_CAP_CHARGE_TIME: 9.9
MDC_IDC_MSMT_CAP_CHARGE_TYPE: NORMAL
MDC_IDC_MSMT_CAP_CHARGE_TYPE: NORMAL
MDC_IDC_MSMT_LEADCHNL_LV_IMPEDANCE_VALUE: 483 OHM
MDC_IDC_MSMT_LEADCHNL_LV_PACING_THRESHOLD_AMPLITUDE: 1.4 V
MDC_IDC_MSMT_LEADCHNL_LV_PACING_THRESHOLD_PULSEWIDTH: 0.8 MS
MDC_IDC_MSMT_LEADCHNL_LV_SENSING_INTR_AMPL: 9 MV
MDC_IDC_MSMT_LEADCHNL_RA_IMPEDANCE_VALUE: 464 OHM
MDC_IDC_MSMT_LEADCHNL_RA_PACING_THRESHOLD_AMPLITUDE: 1.7 V
MDC_IDC_MSMT_LEADCHNL_RA_PACING_THRESHOLD_PULSEWIDTH: 1 MS
MDC_IDC_MSMT_LEADCHNL_RV_IMPEDANCE_VALUE: 450 OHM
MDC_IDC_MSMT_LEADCHNL_RV_PACING_THRESHOLD_AMPLITUDE: 0.9 V
MDC_IDC_MSMT_LEADCHNL_RV_PACING_THRESHOLD_PULSEWIDTH: 0.4 MS
MDC_IDC_MSMT_LEADCHNL_RV_SENSING_INTR_AMPL: 9 MV
MDC_IDC_PG_IMPLANT_DTM: NORMAL
MDC_IDC_PG_MFG: NORMAL
MDC_IDC_PG_MODEL: NORMAL
MDC_IDC_PG_SERIAL: NORMAL
MDC_IDC_PG_TYPE: NORMAL
MDC_IDC_SESS_CLINIC_NAME: NORMAL
MDC_IDC_SESS_DTM: NORMAL
MDC_IDC_SESS_TYPE: NORMAL
MDC_IDC_SET_BRADY_AT_MODE_SWITCH_MODE: NORMAL
MDC_IDC_SET_BRADY_AT_MODE_SWITCH_RATE: 170 {BEATS}/MIN
MDC_IDC_SET_BRADY_LOWRATE: 60 {BEATS}/MIN
MDC_IDC_SET_BRADY_MAX_SENSOR_RATE: 120 {BEATS}/MIN
MDC_IDC_SET_BRADY_MAX_TRACKING_RATE: 120 {BEATS}/MIN
MDC_IDC_SET_BRADY_MODE: NORMAL
MDC_IDC_SET_BRADY_PAV_DELAY_HIGH: 220 MS
MDC_IDC_SET_BRADY_PAV_DELAY_LOW: 220 MS
MDC_IDC_SET_BRADY_SAV_DELAY_HIGH: 180 MS
MDC_IDC_SET_BRADY_SAV_DELAY_LOW: 180 MS
MDC_IDC_SET_CRT_LVRV_DELAY: 30 MS
MDC_IDC_SET_CRT_PACED_CHAMBERS: NORMAL
MDC_IDC_SET_LEADCHNL_LV_PACING_AMPLITUDE: 2.4 V
MDC_IDC_SET_LEADCHNL_LV_PACING_ANODE_ELECTRODE_1: NORMAL
MDC_IDC_SET_LEADCHNL_LV_PACING_ANODE_LOCATION_1: NORMAL
MDC_IDC_SET_LEADCHNL_LV_PACING_CAPTURE_MODE: NORMAL
MDC_IDC_SET_LEADCHNL_LV_PACING_CATHODE_ELECTRODE_1: NORMAL
MDC_IDC_SET_LEADCHNL_LV_PACING_CATHODE_LOCATION_1: NORMAL
MDC_IDC_SET_LEADCHNL_LV_PACING_POLARITY: NORMAL
MDC_IDC_SET_LEADCHNL_LV_PACING_PULSEWIDTH: 0.8 MS
MDC_IDC_SET_LEADCHNL_LV_SENSING_ADAPTATION_MODE: NORMAL
MDC_IDC_SET_LEADCHNL_LV_SENSING_ANODE_ELECTRODE_1: NORMAL
MDC_IDC_SET_LEADCHNL_LV_SENSING_ANODE_LOCATION_1: NORMAL
MDC_IDC_SET_LEADCHNL_LV_SENSING_CATHODE_ELECTRODE_1: NORMAL
MDC_IDC_SET_LEADCHNL_LV_SENSING_CATHODE_LOCATION_1: NORMAL
MDC_IDC_SET_LEADCHNL_LV_SENSING_POLARITY: NORMAL
MDC_IDC_SET_LEADCHNL_LV_SENSING_SENSITIVITY: 1 MV
MDC_IDC_SET_LEADCHNL_RA_PACING_AMPLITUDE: 2.5 V
MDC_IDC_SET_LEADCHNL_RA_PACING_ANODE_ELECTRODE_1: NORMAL
MDC_IDC_SET_LEADCHNL_RA_PACING_ANODE_LOCATION_1: NORMAL
MDC_IDC_SET_LEADCHNL_RA_PACING_CAPTURE_MODE: NORMAL
MDC_IDC_SET_LEADCHNL_RA_PACING_CATHODE_ELECTRODE_1: NORMAL
MDC_IDC_SET_LEADCHNL_RA_PACING_CATHODE_LOCATION_1: NORMAL
MDC_IDC_SET_LEADCHNL_RA_PACING_POLARITY: NORMAL
MDC_IDC_SET_LEADCHNL_RA_PACING_PULSEWIDTH: 1 MS
MDC_IDC_SET_LEADCHNL_RA_SENSING_ADAPTATION_MODE: NORMAL
MDC_IDC_SET_LEADCHNL_RA_SENSING_ANODE_ELECTRODE_1: NORMAL
MDC_IDC_SET_LEADCHNL_RA_SENSING_ANODE_LOCATION_1: NORMAL
MDC_IDC_SET_LEADCHNL_RA_SENSING_CATHODE_ELECTRODE_1: NORMAL
MDC_IDC_SET_LEADCHNL_RA_SENSING_CATHODE_LOCATION_1: NORMAL
MDC_IDC_SET_LEADCHNL_RA_SENSING_POLARITY: NORMAL
MDC_IDC_SET_LEADCHNL_RA_SENSING_SENSITIVITY: 0.15 MV
MDC_IDC_SET_LEADCHNL_RV_PACING_AMPLITUDE: 2.4 V
MDC_IDC_SET_LEADCHNL_RV_PACING_ANODE_ELECTRODE_1: NORMAL
MDC_IDC_SET_LEADCHNL_RV_PACING_ANODE_LOCATION_1: NORMAL
MDC_IDC_SET_LEADCHNL_RV_PACING_CAPTURE_MODE: NORMAL
MDC_IDC_SET_LEADCHNL_RV_PACING_CATHODE_ELECTRODE_1: NORMAL
MDC_IDC_SET_LEADCHNL_RV_PACING_CATHODE_LOCATION_1: NORMAL
MDC_IDC_SET_LEADCHNL_RV_PACING_POLARITY: NORMAL
MDC_IDC_SET_LEADCHNL_RV_PACING_PULSEWIDTH: 0.4 MS
MDC_IDC_SET_LEADCHNL_RV_SENSING_ADAPTATION_MODE: NORMAL
MDC_IDC_SET_LEADCHNL_RV_SENSING_ANODE_ELECTRODE_1: NORMAL
MDC_IDC_SET_LEADCHNL_RV_SENSING_ANODE_LOCATION_1: NORMAL
MDC_IDC_SET_LEADCHNL_RV_SENSING_CATHODE_ELECTRODE_1: NORMAL
MDC_IDC_SET_LEADCHNL_RV_SENSING_CATHODE_LOCATION_1: NORMAL
MDC_IDC_SET_LEADCHNL_RV_SENSING_POLARITY: NORMAL
MDC_IDC_SET_LEADCHNL_RV_SENSING_SENSITIVITY: 0.6 MV
MDC_IDC_SET_ZONE_DETECTION_INTERVAL: 273 MS
MDC_IDC_SET_ZONE_DETECTION_INTERVAL: 316 MS
MDC_IDC_SET_ZONE_TYPE: NORMAL
MDC_IDC_SET_ZONE_VENDOR_TYPE: NORMAL
MDC_IDC_STAT_AT_BURDEN_PERCENT: 0 %
MDC_IDC_STAT_BRADY_RA_PERCENT_PACED: 45 %
MDC_IDC_STAT_BRADY_RV_PERCENT_PACED: 65 %
MDC_IDC_STAT_CRT_LV_PERCENT_PACED: 57 %
MDC_IDC_STAT_EPISODE_RECENT_COUNT: 0
MDC_IDC_STAT_EPISODE_RECENT_COUNT_DTM_END: NORMAL
MDC_IDC_STAT_EPISODE_RECENT_COUNT_DTM_START: NORMAL
MDC_IDC_STAT_EPISODE_TOTAL_COUNT: 0
MDC_IDC_STAT_EPISODE_TOTAL_COUNT_DTM_END: NORMAL
MDC_IDC_STAT_EPISODE_TYPE: NORMAL
MDC_IDC_STAT_EPISODE_VENDOR_TYPE: NORMAL
MDC_IDC_STAT_TACHYTHERAPY_ATP_DELIVERED_RECENT: 0
MDC_IDC_STAT_TACHYTHERAPY_ATP_DELIVERED_TOTAL: 0
MDC_IDC_STAT_TACHYTHERAPY_RECENT_DTM_END: NORMAL
MDC_IDC_STAT_TACHYTHERAPY_RECENT_DTM_START: NORMAL
MDC_IDC_STAT_TACHYTHERAPY_SHOCKS_ABORTED_RECENT: 0
MDC_IDC_STAT_TACHYTHERAPY_SHOCKS_ABORTED_TOTAL: 0
MDC_IDC_STAT_TACHYTHERAPY_SHOCKS_DELIVERED_RECENT: 0
MDC_IDC_STAT_TACHYTHERAPY_SHOCKS_DELIVERED_TOTAL: 0
MDC_IDC_STAT_TACHYTHERAPY_TOTAL_DTM_END: NORMAL

## 2019-02-04 NOTE — PROGRESS NOTES
This is a recent snapshot of the patient's [facility] medical record.  For current drug dose and complete information and questions, call [phone] or In Basket pool, [facility] home infusion ([id number])  CSN Number:  [id number]

## 2019-02-06 ENCOUNTER — HOSPITAL ENCOUNTER (INPATIENT)
Facility: CLINIC | Age: 54
Setting detail: SURGERY ADMIT
DRG: 001 | End: 2019-02-06
Attending: THORACIC SURGERY (CARDIOTHORACIC VASCULAR SURGERY) | Admitting: THORACIC SURGERY (CARDIOTHORACIC VASCULAR SURGERY)
Payer: COMMERCIAL

## 2019-02-08 ENCOUNTER — CARE COORDINATION (OUTPATIENT)
Dept: CARDIOLOGY | Facility: CLINIC | Age: 54
End: 2019-02-08

## 2019-02-08 ENCOUNTER — COMMUNICATION - HEALTHEAST (OUTPATIENT)
Dept: ADMINISTRATIVE | Facility: CLINIC | Age: 54
End: 2019-02-08

## 2019-02-08 DIAGNOSIS — I50.23 ACUTE ON CHRONIC SYSTOLIC CONGESTIVE HEART FAILURE (H): Primary | ICD-10-CM

## 2019-02-08 RX ORDER — LIDOCAINE 40 MG/G
CREAM TOPICAL
Status: CANCELLED | OUTPATIENT
Start: 2019-02-08

## 2019-02-08 NOTE — PROGRESS NOTES
I called Everton to tell him the plan for his HeartMate 3 LVAD implant.  Everton will be admitted through the [facility] Waiting room prior to his RHC/leave-in SWAN and IABP placement at 1pm on 2/18. Everton was told to not eat or drink anything for 4 hours prior to this time with the exception of water with medicines. Surgery is scheduled for 2/19 at 1030am with Dr. House.    I will call Everton to tell him when to stop taking his coumadin.

## 2019-02-11 ENCOUNTER — CARE COORDINATION (OUTPATIENT)
Dept: CARDIOLOGY | Facility: CLINIC | Age: 54
End: 2019-02-11

## 2019-02-11 NOTE — PROGRESS NOTES
Dr. House ordered Everton stop taking his Coumadin 7 days prior to surgery. I called Everton to tell him to take his Coumadin tonight but then stop tomorrow. Everton is being admitted Monday, 2/18 for his RHC/SWAN prior to his LVAD implant on 2/19.

## 2019-02-12 ENCOUNTER — CARE COORDINATION (OUTPATIENT)
Dept: TRANSPLANT | Facility: CLINIC | Age: 54
End: 2019-02-12

## 2019-02-12 DIAGNOSIS — M89.9 DISORDER OF BONE: ICD-10-CM

## 2019-02-12 DIAGNOSIS — I42.9 CARDIOMYOPATHY (H): ICD-10-CM

## 2019-02-12 DIAGNOSIS — Z01.818 ENCOUNTER FOR OTHER PREPROCEDURAL EXAMINATION: ICD-10-CM

## 2019-02-12 DIAGNOSIS — Z11.59 ENCOUNTER FOR SCREENING FOR OTHER VIRAL DISEASES: ICD-10-CM

## 2019-02-12 DIAGNOSIS — Z13.820 SCREENING FOR OSTEOPOROSIS: Primary | ICD-10-CM

## 2019-02-14 ENCOUNTER — DOCUMENTATION ONLY (OUTPATIENT)
Dept: TRANSPLANT | Facility: CLINIC | Age: 54
End: 2019-02-14

## 2019-02-14 ENCOUNTER — TELEPHONE (OUTPATIENT)
Dept: TRANSPLANT | Facility: CLINIC | Age: 54
End: 2019-02-14

## 2019-02-14 ENCOUNTER — COMMITTEE REVIEW (OUTPATIENT)
Dept: TRANSPLANT | Facility: CLINIC | Age: 54
End: 2019-02-14

## 2019-02-15 ENCOUNTER — CARE COORDINATION (OUTPATIENT)
Dept: CARDIOLOGY | Facility: CLINIC | Age: 54
End: 2019-02-15

## 2019-02-15 ENCOUNTER — ANCILLARY PROCEDURE (OUTPATIENT)
Dept: BONE DENSITY | Facility: CLINIC | Age: 54
End: 2019-02-15
Attending: INTERNAL MEDICINE
Payer: COMMERCIAL

## 2019-02-15 ENCOUNTER — ANCILLARY PROCEDURE (OUTPATIENT)
Dept: ULTRASOUND IMAGING | Facility: CLINIC | Age: 54
End: 2019-02-15
Attending: INTERNAL MEDICINE
Payer: COMMERCIAL

## 2019-02-15 ENCOUNTER — RESULTS ONLY (OUTPATIENT)
Dept: OTHER | Facility: CLINIC | Age: 54
End: 2019-02-15

## 2019-02-15 DIAGNOSIS — I50.20 SYSTOLIC HEART FAILURE (H): Primary | ICD-10-CM

## 2019-02-15 DIAGNOSIS — F10.11 ALCOHOL ABUSE, IN REMISSION: ICD-10-CM

## 2019-02-15 DIAGNOSIS — I50.20 SYSTOLIC HEART FAILURE (H): ICD-10-CM

## 2019-02-15 DIAGNOSIS — I50.22 CHRONIC SYSTOLIC HEART FAILURE (H): Chronic | ICD-10-CM

## 2019-02-15 DIAGNOSIS — Z01.818 ENCOUNTER FOR OTHER PREPROCEDURAL EXAMINATION: ICD-10-CM

## 2019-02-15 DIAGNOSIS — I42.9 CARDIOMYOPATHY (H): ICD-10-CM

## 2019-02-15 DIAGNOSIS — M89.9 DISORDER OF BONE: ICD-10-CM

## 2019-02-15 DIAGNOSIS — Z13.820 SCREENING FOR OSTEOPOROSIS: ICD-10-CM

## 2019-02-15 DIAGNOSIS — Z11.59 ENCOUNTER FOR SCREENING FOR OTHER VIRAL DISEASES: ICD-10-CM

## 2019-02-15 LAB
HBV CORE AB SERPL QL IA: NONREACTIVE
HBV SURFACE AB SERPL IA-ACNC: 1.15 M[IU]/ML
HBV SURFACE AG SERPL QL IA: NONREACTIVE
HCV AB SERPL QL IA: NONREACTIVE
HIV 1+2 AB+HIV1 P24 AG SERPL QL IA: NONREACTIVE
MISCELLANEOUS TEST: NORMAL

## 2019-02-15 NOTE — PROGRESS NOTES
The plan for Everton's pre-op has changed. Per Dr. Donis, he will be admitted directly to  on Sunday 2/17/19 at 10am for SWAN placement and hemodynamic monitoring. On Monday 2/18 the plan is for getting an IABP in the CCL. HeartMate 3 implant scheduled for 2/19. Everton was called and update on this plan. His last dose of Coumadin was on 2/11. He will call with any questions or concerns.

## 2019-02-16 LAB
CMV IGG SERPL QL IA: >8 AI (ref 0–0.8)
HSV1 IGG SERPL QL IA: >8 AI (ref 0–0.8)
HSV2 IGG SERPL QL IA: <0.2 AI (ref 0–0.8)
VZV IGG SER QL IA: 6.8 AI (ref 0–0.8)

## 2019-02-17 ENCOUNTER — APPOINTMENT (OUTPATIENT)
Dept: GENERAL RADIOLOGY | Facility: CLINIC | Age: 54
DRG: 001 | End: 2019-02-17
Attending: INTERNAL MEDICINE
Payer: COMMERCIAL

## 2019-02-17 ENCOUNTER — RECORDS - HEALTHEAST (OUTPATIENT)
Dept: ADMINISTRATIVE | Facility: OTHER | Age: 54
End: 2019-02-17

## 2019-02-17 ENCOUNTER — HOSPITAL ENCOUNTER (INPATIENT)
Facility: CLINIC | Age: 54
LOS: 45 days | Discharge: NURSING, REHAB OR LONG-TERM CARE | DRG: 001 | End: 2019-04-03
Attending: INTERNAL MEDICINE | Admitting: INTERNAL MEDICINE
Payer: COMMERCIAL

## 2019-02-17 DIAGNOSIS — Z94.1 HEART REPLACED BY TRANSPLANT (H): Primary | ICD-10-CM

## 2019-02-17 DIAGNOSIS — I50.22 CHRONIC SYSTOLIC HEART FAILURE (H): Chronic | ICD-10-CM

## 2019-02-17 DIAGNOSIS — Z99.2 ACUTE RENAL FAILURE ON DIALYSIS (H): ICD-10-CM

## 2019-02-17 DIAGNOSIS — I50.23 ACUTE ON CHRONIC SYSTOLIC CONGESTIVE HEART FAILURE (H): ICD-10-CM

## 2019-02-17 DIAGNOSIS — R11.0 NAUSEA: ICD-10-CM

## 2019-02-17 DIAGNOSIS — E87.6 HYPOKALEMIA: ICD-10-CM

## 2019-02-17 DIAGNOSIS — Z94.1 S/P ORTHOTOPIC HEART TRANSPLANT (H): ICD-10-CM

## 2019-02-17 DIAGNOSIS — N17.9 ACUTE RENAL FAILURE ON DIALYSIS (H): ICD-10-CM

## 2019-02-17 LAB
ACETAMINOPHEN QUAL: NEGATIVE
ALBUMIN SERPL-MCNC: 3.5 G/DL (ref 3.4–5)
ALP SERPL-CCNC: 146 U/L (ref 40–150)
ALT SERPL W P-5'-P-CCNC: 20 U/L (ref 0–70)
AMOBARBITAL QUAL: NEGATIVE
ANION GAP SERPL CALCULATED.3IONS-SCNC: 9 MMOL/L (ref 3–14)
APTT PPP: 36 SEC (ref 22–37)
AST SERPL W P-5'-P-CCNC: 19 U/L (ref 0–45)
BARBITAL QUAL: NEGATIVE
BASE EXCESS BLDV CALC-SCNC: 0.2 MMOL/L
BASE EXCESS BLDV CALC-SCNC: 1.6 MMOL/L
BILIRUB SERPL-MCNC: 2 MG/DL (ref 0.2–1.3)
BUN SERPL-MCNC: 12 MG/DL (ref 7–30)
BUTABARBITAL QUAL: NEGATIVE
BUTALBITAL QUAL: NEGATIVE
CAFFEINE QUAL: POSITIVE
CALCIUM SERPL-MCNC: 8.5 MG/DL (ref 8.5–10.1)
CARBAMAZEPINE QUAL: NEGATIVE
CARISOPRODOL QUAL: NEGATIVE
CHLORIDE SERPL-SCNC: 103 MMOL/L (ref 94–109)
CHLORPROPAMIDE UR-MCNC: NEGATIVE UG/ML
CO2 SERPL-SCNC: 24 MMOL/L (ref 20–32)
CREAT SERPL-MCNC: 0.89 MG/DL (ref 0.66–1.25)
DRUGS SERPL SCN: NEGATIVE
ERYTHROCYTE [DISTWIDTH] IN BLOOD BY AUTOMATED COUNT: 13.9 % (ref 10–15)
ETHCLORVYNOL QUAL: NEGATIVE
ETHINAMATE QUAL: NEGATIVE
ETHOSUXIMIDE QUAL: NEGATIVE
ETHOTOIN QUAL: NEGATIVE
GFR SERPL CREATININE-BSD FRML MDRD: >90 ML/MIN/{1.73_M2}
GLUCOSE BLDC GLUCOMTR-MCNC: 65 MG/DL (ref 70–99)
GLUCOSE SERPL-MCNC: 70 MG/DL (ref 70–99)
GLUTETHIMIDE QUAL: NEGATIVE
HCO3 BLDV-SCNC: 25 MMOL/L (ref 21–28)
HCO3 BLDV-SCNC: 27 MMOL/L (ref 21–28)
HCT VFR BLD AUTO: 40.6 % (ref 40–53)
HGB BLD-MCNC: 13.4 G/DL (ref 13.3–17.7)
IBUPROFEN QUAL: NEGATIVE
INR PPP: 1.47 (ref 0.86–1.14)
LMWH PPP CHRO-ACNC: 0.13 IU/ML
MAGNESIUM SERPL-MCNC: 2 MG/DL (ref 1.6–2.3)
MCH RBC QN AUTO: 30.2 PG (ref 26.5–33)
MCHC RBC AUTO-ENTMCNC: 33 G/DL (ref 31.5–36.5)
MCV RBC AUTO: 92 FL (ref 78–100)
MEPHENYTOIN QUAL: NEGATIVE
MEPHOBARBITAL QUAL: NEGATIVE
MEPROBAMATE QUAL: NEGATIVE
METHAQUALONE QUAL: NEGATIVE
METHARBITAL QUAL: NEGATIVE
METHSUXIMIDE QUAL: NEGATIVE
METHYPRYLON QUAL: NEGATIVE
O2/TOTAL GAS SETTING VFR VENT: 21 %
O2/TOTAL GAS SETTING VFR VENT: 21 %
OXYHGB MFR BLDV: 59 %
OXYHGB MFR BLDV: 60 %
PCO2 BLDV: 38 MM HG (ref 40–50)
PCO2 BLDV: 42 MM HG (ref 40–50)
PENTOBARBITAL QUAL: NEGATIVE
PH BLDV: 7.41 PH (ref 7.32–7.43)
PH BLDV: 7.42 PH (ref 7.32–7.43)
PHENACETIN QUAL: NEGATIVE
PHENOBARBITAL QUAL: NEGATIVE
PHENSUXIMIDE QUAL: NEGATIVE
PHENYTOIN QUAL: NEGATIVE
PHOSPHATE SERPL-MCNC: 3.1 MG/DL (ref 2.5–4.5)
PLATELET # BLD AUTO: 177 10E9/L (ref 150–450)
PO2 BLDV: 33 MM HG (ref 25–47)
PO2 BLDV: 34 MM HG (ref 25–47)
POTASSIUM SERPL-SCNC: 3.4 MMOL/L (ref 3.4–5.3)
PRIMIDONE QUAL: NEGATIVE
PROT SERPL-MCNC: 6.8 G/DL (ref 6.8–8.8)
RBC # BLD AUTO: 4.43 10E12/L (ref 4.4–5.9)
SALICYLATE QUAL: NEGATIVE
SECOBARBITAL QUAL: NEGATIVE
SODIUM SERPL-SCNC: 135 MMOL/L (ref 133–144)
TALBUTAL QUAL: NEGATIVE
THEOPHYLLINE QUAL: NEGATIVE
THIOPENTAL QUAL: NEGATIVE
TYBAMATE QUAL: NEGATIVE
VALPROIC ACID QUAL: NEGATIVE
WBC # BLD AUTO: 4.5 10E9/L (ref 4–11)

## 2019-02-17 PROCEDURE — 40000275 ZZH STATISTIC RCP TIME EA 10 MIN

## 2019-02-17 PROCEDURE — 84100 ASSAY OF PHOSPHORUS: CPT | Performed by: INTERNAL MEDICINE

## 2019-02-17 PROCEDURE — 93451 RIGHT HEART CATH: CPT | Mod: 26 | Performed by: INTERNAL MEDICINE

## 2019-02-17 PROCEDURE — 93503 INSERT/PLACE HEART CATHETER: CPT

## 2019-02-17 PROCEDURE — 93005 ELECTROCARDIOGRAM TRACING: CPT

## 2019-02-17 PROCEDURE — 4A133B3 MONITORING OF ARTERIAL PRESSURE, PULMONARY, PERCUTANEOUS APPROACH: ICD-10-PCS | Performed by: INTERNAL MEDICINE

## 2019-02-17 PROCEDURE — 85610 PROTHROMBIN TIME: CPT | Performed by: INTERNAL MEDICINE

## 2019-02-17 PROCEDURE — 85027 COMPLETE CBC AUTOMATED: CPT | Performed by: INTERNAL MEDICINE

## 2019-02-17 PROCEDURE — 4A023N6 MEASUREMENT OF CARDIAC SAMPLING AND PRESSURE, RIGHT HEART, PERCUTANEOUS APPROACH: ICD-10-PCS | Performed by: INTERNAL MEDICINE

## 2019-02-17 PROCEDURE — 80053 COMPREHEN METABOLIC PANEL: CPT | Performed by: INTERNAL MEDICINE

## 2019-02-17 PROCEDURE — 82805 BLOOD GASES W/O2 SATURATION: CPT | Performed by: INTERNAL MEDICINE

## 2019-02-17 PROCEDURE — 93010 ELECTROCARDIOGRAM REPORT: CPT | Performed by: INTERNAL MEDICINE

## 2019-02-17 PROCEDURE — 20000004 ZZH R&B ICU UMMC

## 2019-02-17 PROCEDURE — 85520 HEPARIN ASSAY: CPT | Performed by: INTERNAL MEDICINE

## 2019-02-17 PROCEDURE — 25000132 ZZH RX MED GY IP 250 OP 250 PS 637: Performed by: STUDENT IN AN ORGANIZED HEALTH CARE EDUCATION/TRAINING PROGRAM

## 2019-02-17 PROCEDURE — 25000128 H RX IP 250 OP 636: Performed by: STUDENT IN AN ORGANIZED HEALTH CARE EDUCATION/TRAINING PROGRAM

## 2019-02-17 PROCEDURE — 82805 BLOOD GASES W/O2 SATURATION: CPT | Performed by: STUDENT IN AN ORGANIZED HEALTH CARE EDUCATION/TRAINING PROGRAM

## 2019-02-17 PROCEDURE — 27210140 ZZH KIT CATH SWAN VIP SUPPLY

## 2019-02-17 PROCEDURE — 40000196 ZZH STATISTIC RAPCV CVP MONITORING

## 2019-02-17 PROCEDURE — 85730 THROMBOPLASTIN TIME PARTIAL: CPT | Performed by: INTERNAL MEDICINE

## 2019-02-17 PROCEDURE — 4A1239Z MONITORING OF CARDIAC OUTPUT, PERCUTANEOUS APPROACH: ICD-10-PCS | Performed by: INTERNAL MEDICINE

## 2019-02-17 PROCEDURE — 36592 COLLECT BLOOD FROM PICC: CPT | Performed by: INTERNAL MEDICINE

## 2019-02-17 PROCEDURE — 83735 ASSAY OF MAGNESIUM: CPT | Performed by: INTERNAL MEDICINE

## 2019-02-17 PROCEDURE — 40000986 XR CHEST PORT 1 VW

## 2019-02-17 PROCEDURE — 40000048 ZZH STATISTIC DAILY SWAN MONITORING

## 2019-02-17 PROCEDURE — 00000146 ZZHCL STATISTIC GLUCOSE BY METER IP

## 2019-02-17 PROCEDURE — 99223 1ST HOSP IP/OBS HIGH 75: CPT | Mod: 25 | Performed by: INTERNAL MEDICINE

## 2019-02-17 RX ORDER — HEPARIN SODIUM 10000 [USP'U]/100ML
0-3500 INJECTION, SOLUTION INTRAVENOUS CONTINUOUS
Status: DISCONTINUED | OUTPATIENT
Start: 2019-02-17 | End: 2019-02-19

## 2019-02-17 RX ORDER — ALUMINA, MAGNESIA, AND SIMETHICONE 2400; 2400; 240 MG/30ML; MG/30ML; MG/30ML
30 SUSPENSION ORAL EVERY 4 HOURS PRN
Status: DISCONTINUED | OUTPATIENT
Start: 2019-02-17 | End: 2019-04-03 | Stop reason: HOSPADM

## 2019-02-17 RX ORDER — LIDOCAINE 40 MG/G
CREAM TOPICAL
Status: DISCONTINUED | OUTPATIENT
Start: 2019-02-17 | End: 2019-03-02

## 2019-02-17 RX ORDER — ALBUTEROL SULFATE 90 UG/1
2 AEROSOL, METERED RESPIRATORY (INHALATION) EVERY 6 HOURS PRN
Status: DISCONTINUED | OUTPATIENT
Start: 2019-02-17 | End: 2019-04-03 | Stop reason: HOSPADM

## 2019-02-17 RX ORDER — AMOXICILLIN 250 MG
2 CAPSULE ORAL 2 TIMES DAILY PRN
Status: DISCONTINUED | OUTPATIENT
Start: 2019-02-17 | End: 2019-02-26

## 2019-02-17 RX ORDER — MONTELUKAST SODIUM 10 MG/1
10 TABLET ORAL AT BEDTIME
Status: DISCONTINUED | OUTPATIENT
Start: 2019-02-17 | End: 2019-04-03 | Stop reason: HOSPADM

## 2019-02-17 RX ORDER — MESALAMINE 800 MG/1
2400 TABLET, DELAYED RELEASE ORAL 2 TIMES DAILY
Status: DISCONTINUED | OUTPATIENT
Start: 2019-02-17 | End: 2019-04-03 | Stop reason: HOSPADM

## 2019-02-17 RX ORDER — MAGNESIUM SULFATE HEPTAHYDRATE 40 MG/ML
4 INJECTION, SOLUTION INTRAVENOUS EVERY 4 HOURS PRN
Status: DISCONTINUED | OUTPATIENT
Start: 2019-02-17 | End: 2019-02-18

## 2019-02-17 RX ORDER — ACETAMINOPHEN 650 MG/1
650 SUPPOSITORY RECTAL EVERY 4 HOURS PRN
Status: DISCONTINUED | OUTPATIENT
Start: 2019-02-17 | End: 2019-03-19

## 2019-02-17 RX ORDER — DULOXETIN HYDROCHLORIDE 20 MG/1
20 CAPSULE, DELAYED RELEASE ORAL DAILY
Status: DISCONTINUED | OUTPATIENT
Start: 2019-02-18 | End: 2019-04-03 | Stop reason: HOSPADM

## 2019-02-17 RX ORDER — ASPIRIN 81 MG/1
324 TABLET, CHEWABLE ORAL ONCE
Status: DISCONTINUED | OUTPATIENT
Start: 2019-02-17 | End: 2019-02-17

## 2019-02-17 RX ORDER — TAMSULOSIN HYDROCHLORIDE 0.4 MG/1
0.4 CAPSULE ORAL
Status: DISCONTINUED | OUTPATIENT
Start: 2019-02-17 | End: 2019-02-22

## 2019-02-17 RX ORDER — NITROGLYCERIN 0.4 MG/1
0.4 TABLET SUBLINGUAL EVERY 5 MIN PRN
Status: DISCONTINUED | OUTPATIENT
Start: 2019-02-17 | End: 2019-02-24

## 2019-02-17 RX ORDER — ACETAMINOPHEN 325 MG/1
650 TABLET ORAL EVERY 4 HOURS PRN
Status: DISCONTINUED | OUTPATIENT
Start: 2019-02-17 | End: 2019-04-03 | Stop reason: HOSPADM

## 2019-02-17 RX ORDER — BISACODYL 10 MG
10 SUPPOSITORY, RECTAL RECTAL DAILY PRN
Status: DISCONTINUED | OUTPATIENT
Start: 2019-02-17 | End: 2019-03-23

## 2019-02-17 RX ORDER — LIDOCAINE 40 MG/G
CREAM TOPICAL
Status: DISCONTINUED | OUTPATIENT
Start: 2019-02-17 | End: 2019-03-01

## 2019-02-17 RX ORDER — POTASSIUM CHLORIDE 1.5 G/1.58G
20-40 POWDER, FOR SOLUTION ORAL
Status: DISCONTINUED | OUTPATIENT
Start: 2019-02-17 | End: 2019-02-19

## 2019-02-17 RX ORDER — LEVOTHYROXINE SODIUM 100 UG/1
100 TABLET ORAL DAILY
Status: DISCONTINUED | OUTPATIENT
Start: 2019-02-18 | End: 2019-04-03 | Stop reason: HOSPADM

## 2019-02-17 RX ORDER — POTASSIUM CHLORIDE 29.8 MG/ML
20 INJECTION INTRAVENOUS
Status: DISCONTINUED | OUTPATIENT
Start: 2019-02-17 | End: 2019-02-19

## 2019-02-17 RX ORDER — AMOXICILLIN 250 MG
1 CAPSULE ORAL 2 TIMES DAILY PRN
Status: DISCONTINUED | OUTPATIENT
Start: 2019-02-17 | End: 2019-02-26

## 2019-02-17 RX ORDER — POTASSIUM CHLORIDE 7.45 MG/ML
10 INJECTION INTRAVENOUS
Status: DISCONTINUED | OUTPATIENT
Start: 2019-02-17 | End: 2019-02-19

## 2019-02-17 RX ORDER — POTASSIUM CL/LIDO/0.9 % NACL 10MEQ/0.1L
10 INTRAVENOUS SOLUTION, PIGGYBACK (ML) INTRAVENOUS
Status: DISCONTINUED | OUTPATIENT
Start: 2019-02-17 | End: 2019-02-19

## 2019-02-17 RX ORDER — AMIODARONE HYDROCHLORIDE 200 MG/1
200 TABLET ORAL DAILY
Status: DISCONTINUED | OUTPATIENT
Start: 2019-02-18 | End: 2019-02-24

## 2019-02-17 RX ORDER — MILRINONE LACTATE 0.2 MG/ML
0.3 INJECTION, SOLUTION INTRAVENOUS CONTINUOUS
Status: DISCONTINUED | OUTPATIENT
Start: 2019-02-17 | End: 2019-02-24

## 2019-02-17 RX ORDER — SPIRONOLACTONE 25 MG
12.5 TABLET ORAL DAILY
Status: DISCONTINUED | OUTPATIENT
Start: 2019-02-18 | End: 2019-02-24

## 2019-02-17 RX ORDER — POTASSIUM CHLORIDE 750 MG/1
20-40 TABLET, EXTENDED RELEASE ORAL
Status: DISCONTINUED | OUTPATIENT
Start: 2019-02-17 | End: 2019-02-19

## 2019-02-17 RX ORDER — FUROSEMIDE 20 MG
40 TABLET ORAL 2 TIMES DAILY
Status: DISCONTINUED | OUTPATIENT
Start: 2019-02-17 | End: 2019-02-18

## 2019-02-17 RX ORDER — POLYETHYLENE GLYCOL 3350 17 G/17G
17 POWDER, FOR SOLUTION ORAL DAILY PRN
Status: DISCONTINUED | OUTPATIENT
Start: 2019-02-17 | End: 2019-03-12

## 2019-02-17 RX ORDER — AZELASTINE 1 MG/ML
2 SPRAY, METERED NASAL 2 TIMES DAILY PRN
Status: DISCONTINUED | OUTPATIENT
Start: 2019-02-17 | End: 2019-04-03 | Stop reason: HOSPADM

## 2019-02-17 RX ORDER — ONDANSETRON 4 MG/1
4 TABLET, ORALLY DISINTEGRATING ORAL EVERY 8 HOURS PRN
Status: DISCONTINUED | OUTPATIENT
Start: 2019-02-17 | End: 2019-03-18

## 2019-02-17 RX ADMIN — HEPARIN SODIUM 750 UNITS/HR: 10000 INJECTION, SOLUTION INTRAVENOUS at 17:05

## 2019-02-17 RX ADMIN — FUROSEMIDE 40 MG: 20 TABLET ORAL at 17:10

## 2019-02-17 RX ADMIN — ACETAMINOPHEN 650 MG: 325 TABLET, FILM COATED ORAL at 17:51

## 2019-02-17 RX ADMIN — ACETAMINOPHEN 650 MG: 325 TABLET, FILM COATED ORAL at 22:34

## 2019-02-17 RX ADMIN — FLUTICASONE FUROATE 1 PUFF: 200 POWDER RESPIRATORY (INHALATION) at 16:48

## 2019-02-17 RX ADMIN — TAMSULOSIN HYDROCHLORIDE 0.4 MG: 0.4 CAPSULE ORAL at 17:10

## 2019-02-17 RX ADMIN — OMEPRAZOLE 40 MG: 20 CAPSULE, DELAYED RELEASE ORAL at 20:47

## 2019-02-17 RX ADMIN — MILRINONE LACTATE 0.12 MCG/KG/MIN: 200 INJECTION, SOLUTION INTRAVENOUS at 14:25

## 2019-02-17 RX ADMIN — MESALAMINE 2400 MG: 800 TABLET, DELAYED RELEASE ORAL at 20:46

## 2019-02-17 RX ADMIN — MONTELUKAST SODIUM 10 MG: 10 TABLET, COATED ORAL at 22:34

## 2019-02-17 ASSESSMENT — COLUMBIA-SUICIDE SEVERITY RATING SCALE - C-SSRS
2. HAVE YOU ACTUALLY HAD ANY THOUGHTS OF KILLING YOURSELF IN THE PAST MONTH?: NO
1. IN THE PAST MONTH, HAVE YOU WISHED YOU WERE DEAD OR WISHED YOU COULD GO TO SLEEP AND NOT WAKE UP?: NO
6. HAVE YOU EVER DONE ANYTHING, STARTED TO DO ANYTHING, OR PREPARED TO DO ANYTHING TO END YOUR LIFE?: NO

## 2019-02-17 ASSESSMENT — ACTIVITIES OF DAILY LIVING (ADL)
BATHING: 0-->INDEPENDENT
SWALLOWING: 0-->SWALLOWS FOODS/LIQUIDS WITHOUT DIFFICULTY
FALL_HISTORY_WITHIN_LAST_SIX_MONTHS: NO
DRESS: 0-->INDEPENDENT
TOILETING: 0-->INDEPENDENT
ADLS_ACUITY_SCORE: 11
TRANSFERRING: 0-->INDEPENDENT
COGNITION: 0 - NO COGNITION ISSUES REPORTED
RETIRED_COMMUNICATION: 0-->UNDERSTANDS/COMMUNICATES WITHOUT DIFFICULTY
AMBULATION: 0-->INDEPENDENT
RETIRED_EATING: 0-->INDEPENDENT
ADLS_ACUITY_SCORE: 11

## 2019-02-17 ASSESSMENT — PAIN DESCRIPTION - DESCRIPTORS
DESCRIPTORS: DISCOMFORT

## 2019-02-17 ASSESSMENT — MIFFLIN-ST. JEOR: SCORE: 1444.99

## 2019-02-17 NOTE — PROCEDURES
Procedure/Surgery Information   [facility], [place]     Procedure Note  Date of Service (when I performed the procedure): 02/17/2019    Procedure: Right internal jugular venous PA catheter placement    Indication:  Hemodynamic monitoring    Consent obtained from patient after discussing the risks, benefits and alternatives.    I have reviewed the lab findings, diagnostic data, medications, and the plan for procedure. I have determined this patient to be an appropriate candidate for the planned procedure and have reassessed the patient IMMEDIATELY PRIOR to procedure.  Prior to the start of the procedure and with procedural staff participation, I verbally confirmed the patient s identity using two indicators, relevant allergies, that the procedure was appropriate and matched the consent or emergent situation, and that the correct equipment/implants were available. Immediately prior to starting the procedure I conducted the Time Out with the procedural staff and re-confirmed the patient s name, procedure, and site/side. (The Joint Commission universal protocol was followed.)  Yes      Sedatives: None    Analgesics: local infiltration with 3 ml of lidocaine used    Vital signs, airway, and pulse oximetry were monitored and remained stable throughout the procedure and appropriate sedation was maintained until the procedure was complete. Placement of guidewire was confirmed by bedside ultrasound.    Patient tolerance: Patient tolerated the procedure well with no immediate complications.    Performed by: Saud Jaeger  Authorized by: Saud Jaeger     Cardiology fellow was present at bedside during entire procedure.    Saud Jaeger MD  Internal Medicine PGY-2  Pager [phone]    .I was present for key elements of procedure.  Ebony

## 2019-02-17 NOTE — PROCEDURES
BEDSIDE CARDIAC CATH REPORT:     PROCEDURES PERFORMED:   Right Heart Catheterization    PHYSICIANS:  Attending Physician: Rosendo Beck  Cardiology Fellow: Constantin Rockwell  Resident: Rajeev Jaeger    INDICATION:  Everton Larios is a 53 year old male who presents to the hospital as scheduled admission for potential LVAD/OHT.  History of HFrEF ACC/AHA D NYHA III on home milrinone, INTERMACS 3.  He is undergoing the above procedure for hemodynamic assessment preoperatively.  Procedure performed within the ICU.    DESCRIPTION:  1. Consent obtained with discussion of risks.  All questions were answered.  2. Sterile prep and procedure.  3. Location with Sheaths:   Rt IJ  9 Fr 10 cm [short] Dual Lumen MAC Sheath  4. Access: Local anesthetic with lidocaine.  A standard 18 guage needle with ultrasound guidance was used to establish vascular access using a modified Seldinger technique.  5. Diagnostic Catheters:   7 Fr  [place] Liz  6. Estimated blood loss: < 5 ml    MEDICATIONS:  Heart rate, BP, respiration, oxygen saturation and patient responses were monitored throughout the procedure with the assistance of the RN under my supervision.    Procedures:    HEMODYNAMICS:  BSA 1.75  1. HR 70 bpm  2. BP 91/64/74 mmHg  3. RA 11   4. RV 49/11  5. PA 49/26/34   6. PCW 24   7. PA sat 60%   9. Hgb 13.4 g/dL   10. Jaden CO 3.2   11. Jaden CI 1.8   12. SVR 1595  13. PVR 3.0     COMPLICATIONS:  1. None.  CXR without pneumothorax.  [place] retracted slightly after CXR, currently at 56 cm.    SUMMARY:   >> High right sided filling pressures.  >> High left sided filling pressures.  >> Mild pulmonary artery hypertension, predominantly post capillary  >> Reduced cardiac output    PLAN:   >> Continue IV milrinone.  Patient to undergo femoral IABP tomorrow in cath lab.    The attending cardiologist was present and supervised all critical aspects the procedure.    Findings discussed with Dr. Beck.    Constantin Rockwell MD PGY5  Cardiology  Fellow    I was personally present for key parts of the procedure

## 2019-02-17 NOTE — LETTER
Everton Larios  [address] [facility] [place] [address]      February 19, 2019    Dear Mr. Larios,    This letter is written in follow-up to your recent heart transplant evaluation at the [facility]. As you are aware, we have received coverage approval from your insurance company(s), and therefore your name was added to the [facility] heart transplant waiting list on Monday, February 18, 2019.. You have been listed as Status 2, due to the balloon pump.    During the waiting period, an ALA or PRA ( antibody ) blood sample will need to be sent to the [facility] every 3 months.  This immunology blood sample will be used to match you with potential organ donors.  Your first sample should be sent in mid-May, 2019.   A package of special mailing boxes, called mailers, will be sent to you directly from our Outreach Lab Department.  These mailers can be reordered by calling [phone], [phone] or by calling [phone].  Please take the physician order and one  to your home laboratory when it is time to re-check your PRA.    In addition, you or your doctor(s) must notify the Transplant office at the number above if you should acquire any significant infections (such as pneumonia or abscesses), and/or experience any significant changes in your medical condition,and/or require hospitalization. During business hours, 8:30 AM - 4:00 PM Monday -Friday, please notify us at the above number. During non-business hours and on weekends or holidays, please use the emergency number,[phone] ([phone]) to notify the On-Call Transplant Coordinator.     Our program has physician and surgeon coverage 24 hours a day,365 days a year. If this coverage changes or there are substantial program changes, you will be notified in writing by letter.     This letter will also serve as a reminder to complete your dental work and obtain any necessary cancer screening tests required before  your transplant. This includes colonoscopy, as well as prostate screening for men, and mammogram and gynecologic testing for women. Your primary care clinic can assist you with arranging for these exams. Please remind your caregivers to forward copies of your records and final reports once your dental work andcancer screening is complete.     Finally, attached is a letter from the [employer] ([employer]). It describes the servicesand information offered to patients by [employer] and the Organ Procurement and Transplantation Network.    Sincerely,     Barry Blunt RN   Transplant Coordinator  Thoracic Transplant Program    Encl. [employer] Letter.    CC:  Black Wolff MD  [facility].

## 2019-02-17 NOTE — PROGRESS NOTES
Admitted/transferred from: home  Reason for admission/transfer: swan placement today and balloon pump tomorrow  Patient status upon admission/transfer: Alert, oriented, and independent with home milronine gtt infusing  Interventions: admit   Plan: cardiac optimization and work-up for LVAD/heart transplant  2 RN skin assessment: completed by Meghann Soriano and Laureano Alvarez  Result of skin assessment and interventions/actions: scattered bruising left upper extremity otherwise skin without noted issues  Height, weight, drug calc weight: done  Patient belongings: clothing, shoes, jacket, home infusion pump, wallet, cell phone, toiletries  MDRO education (if applicable): n/a

## 2019-02-17 NOTE — H&P
Cardiology History and Physical    Patient Name: Everton Larios MRN# [id number]   Age: 53 year old YOB: 1965     Date of Admission:2/17/2019  Primary care provider: Fredrick Wolff  Date of Service: 2/17/2019         Assessment and Plan:   Everton Larios is a 52 yo gentleman with a h/o NICM, HFrEF (EF 15%) s/p ICD placement (2008, generator change 2018), congenital ASD repair in childhood, paroxysmal atrial fibrillation s/p ablation and cardioversion, hypothyroidism, EtOH abuse, ulcerative colitis, remote history of GI bleed with splenic embolization, and a recent admission in 11/2018 for ambulatory cardiogenic shock requiring initiation of IV inotropes, who was admitted for LVAD placement vs heart transplantation.    # Chronic decompensated systolic heart failure  # Ambulatory cardiogenic shock, inotrope dependent  # NICM s/p ICD  # H/o congenital ASD repair  NYHA class: IV  AHA stage: D  Etiology: NICM    Patient notes that when fluid builds up, it is usually in his abdomen, but is currently asymptomatic. Has been short of breath with at least 2 blocks of ambulation. Currently undergoing process of LVAD vs heart transplant now that work up has been completed. [place] numbers on admission showed CVP 11, CI 3.2, PCWP 24, and SVR 1600; PCWP slightly elevated from last RHC.    GDMT: continue PTA lasix 40 mg PO BID, spironolactone 12.5 mg daily  Inotrope: continue PTA milrinone 0.125 mcg/kg/min  Vasopressor: none  Assist device: none, planning for IABP 2/18, NPO at midnight  Transplant status: pending    - electrolyte protocol ordered, held PTA KCl  - will update transplant status after balloon pump tomorrow    # Paroxysmal Afib s/p ablation  - switch PTA warfarin to low dose heparin gtt  - continue PTA amiodarone 200 mg daily  - not on beta-blocker at this time    # Hypothyroidism: continue PTA levothyroxine  # Ulcerative colitis: continue PTA mesalamine  # GERD: continue PTA omeprazole  # BPH: continue  PTA tamsulosin  # Depression: continue PTA duloxetine  # H/o EtOH abuse: currently denies any EtOH since 4 months ago    FEN: 2g Na, 2L fluid restriction, NPO at [place] for IABP 2/18  Ppx: heparin gtt  LDA: PIV, R PICC, RIJ PA catheter  Dispo: Pending LVAD vs heart transplant this admission    CODE: Full    Patient was seen and discussed with Dr. Beck, who agrees with the assessment and plan.    Saud Jaeger MD  Internal Medicine PGY-2  Pager [phone]    =============================================================         Chief Complaint:   Planned admission for LVAD vs heart transplant         HPI:   Everton Larios is a 54 yo gentleman with a h/o NICM, HFrEF (EF 15%) s/p ICD placement (2008, generator change 2018), congenital ASD repair in childhood, paroxysmal atrial fibrillation s/p ablation and cardioversion, hypothyroidism, EtOH abuse, ulcerative colitis, remote history of GI bleed with splenic embolization, and a recent admission in 11/2018 for ambulatory cardiogenic shock requiring initiation of IV inotropes, who was admitted for LVAD placement vs heart transplantation. He follows with Dr. Donis in clinic and received a phone call for this planned admission on Thursday. He describes a longstanding history of heart failure due to an ASD that was repaired as a child. He has been on milrinone drip since his recent admission for cardiogenic shock in 11/2018. He describes that his fluid status is good today, but had some so-so days with some mild shortness of breath 2-3 days ago. Felt a little short of breath just walking to the hospital today. He denies any PND, only rarely has orthopnea. He describes fluid usually builds up in his abdomen which causes him to feel full and constipated. He otherwise feels well, denies fevers, chills, night sweats, chest pain, abdominal pain, nausea, vomiting, diarrhea, joint pains or muscle aches. He notes that his last dose of warfarin was 6 days ago as he was  instructed to hold it given anticipation for transplant.         Past Medical History:     Past Medical History:   Diagnosis Date     Alcohol abuse      Pierce's esophagus 10/4/2018     Chronic rhinitis 10/4/2018     Chronic systolic heart failure (H) 10/4/2018     Depression 10/4/2018     Diastolic dysfunction      DJD (degenerative joint disease) - neck 10/4/2018     H/O congenital atrial septal defect (ASD) repair at age 3 10/4/2018     Hypothyroidism due to medication 10/4/2018     ICD, [place] scientific 2008; gen change 2/2018 10/4/2018     Intermittent asthma without complication 10/4/2018     Nonischemic cardiomyopathy (H) 10/4/2018     On amiodarone therapy 10/4/2018     Paroxysmal atrial fibrillation (H) 10/4/2018     S/P ablation of atrial fibrillation 10/4/2018     Systolic heart failure (H)      Ulcerative colitis (H) 10/4/2018     Ventricular tachycardia (H) 10/4/2018     Last Cardiac Hospitalization:   11/2018 - cardiogenic shock requiring inotrope therapy    Last ECHO:   TTE (11/2018):  Interpretation Summary  Severely (EF 10-20%) reduced left ventricular function is present. LVIDd: 6.3  cm. Severe diffuse hypokinesis is present. Biplane EF is 17%. LV apical  hypertrabeculation noted with no evidence of thrombus.  Grade III or advanced diastolic dysfunction.  Global right ventricular function is moderately reduced.  The right ventricle is normal size.There is moderate right ventricular  hypertrophy.  Severe biatrial enlargement is present.  Mild mitral insufficiency is present.  No pericardial effusion is present.  Previous study not available for comparison.    Last Angiogram:   RHC (11/2018): RA 12, mPAP 37, PCWP 28, CO 2.4 (Jaden 2.0) , CI 1.4 (Jaden 1.1), SVR 18.8         Past Surgical History:     Past Surgical History:   Procedure Laterality Date     AICD, DUAL CHAMBER              Social History:     Social History     Socioeconomic History     Marital status: Single     Spouse name: Not on file      Number of children: Not on file     Years of education: Not on file     Highest education level: Not on file   Social Needs     Financial resource strain: Not on file     Food insecurity - worry: Not on file     Food insecurity - inability: Not on file     Transportation needs - medical: Not on file     Transportation needs - non-medical: Not on file   Occupational History     Not on file   Tobacco Use     Smoking status: Former Smoker     Years: 10.00     Smokeless tobacco: Never Used   Substance and Sexual Activity     Alcohol use: Yes     Alcohol/week: 0.6 oz     Types: 1 Cans of beer per week     Comment: a couple times a week      Drug use: No     Sexual activity: Not on file   Other Topics Concern     Not on file   Social History Narrative     Not on file     Denies any current tobacco or alcohol use. Last drink of alcohol was 4 months ago. No prior history of IV drug use.         Family History:   No family history of heart failure.         Immunizations:     There is no immunization history on file for this patient.           Allergies:      Allergies   Allergen Reactions     Lisinopril Other (See Comments)     hypotension     Codeine Nausea            Medications:     Prior to Admission Medications   Prescriptions Last Dose Informant Patient Reported? Taking?   Alpha-D-Galactosidase (BEANO PO)   Yes No   Sig: Take 2 tablets by mouth 3 times daily    BETA BLOCKER NOT PRESCRIBED, INTENTIONAL,   No No   Sig: Beta Blocker not prescribed intentionally due to Recent tx with IV positive inotropic agent   Patient not taking: Reported on 1/30/2019   DULoxetine (CYMBALTA) 20 MG EC capsule   Yes No   Sig: Take 20 mg by mouth daily   albuterol (PROAIR HFA/PROVENTIL HFA/VENTOLIN HFA) 108 (90 Base) MCG/ACT inhaler  Self Yes No   Sig: Inhale 2 puffs into the lungs every 6 hours as needed for shortness of breath / dyspnea or wheezing   amiodarone (PACERONE/CODARONE) 200 MG tablet  Self Yes No   Sig: Take 1 tablet (200  mg) by mouth daily   azelastine (ASTELIN) 0.1 % nasal spray  Self Yes No   Sig: Spray 2 sprays into both nostrils 2 times daily   fluticasone (FLOVENT HFA) 220 MCG/ACT Inhaler   Yes No   Sig: Inhale 2 puffs into the lungs 2 times daily   furosemide (LASIX) 20 MG tablet   No No   Sig: Take 2 tablets (40 mg) by mouth 2 times daily   levothyroxine (SYNTHROID/LEVOTHROID) 88 MCG tablet   Yes No   Sig: Take 100 mcg by mouth daily    mesalamine (ASACOL HD) 800 MG EC tablet  Self Yes No   Sig: Take 3 tablets (2,400 mg) by mouth 2 times daily   milrinone (PRIMACOR) infusion 200 mcg/mL PREMIX   No No   Sig: Inject 6.6375 mcg/min into the vein continuous   montelukast (SINGULAIR) 10 MG tablet  Self Yes No   Sig: Take 1 tablet (10 mg) by mouth At Bedtime   omeprazole (PRILOSEC) 40 MG capsule  Self Yes No   Sig: Take 1 capsule (40 mg) by mouth daily In pm   ondansetron (ZOFRAN-ODT) 4 MG ODT tab  Self Yes No   Sig: Take 1 tablet (4 mg) by mouth every 8 hours as needed for nausea   potassium chloride ER (K-DUR/KLOR-CON M) 20 MEQ CR tablet   No No   Sig: Take 40 mg in the am and 20 mg in the afternoon.   spironolactone (ALDACTONE) 25 MG tablet   No No   Sig: Take 0.5 tablets (12.5 mg) by mouth daily   tamsulosin (FLOMAX) 0.4 MG capsule  Self Yes No   Sig: Take 1 capsule (0.4 mg) by mouth daily (with dinner)   warfarin (COUMADIN) 5 MG tablet  Self Yes No   Sig: Take 2.5 mg on Mon/Wed/Fri and 5 mg on all other days of the week      Facility-Administered Medications: None             Review of Systems:   A complete, 10 point ROS was performed and is negative other than what is stated in the HPI.         Physical Exam:   Blood pressure 102/73, pulse 78, temperature 97.9  F (36.6  C), temperature source Oral, SpO2 96 %.  Estimated Dry Weight - 130 (weight is 140 lbs today)    [place] (2/17): CVP 11, PA 49/26, Jaden CO 3.2. Jaden CI 1.8, SVR 1595, SvO2 60, SaO2 98    General:  Alert, sitting up in bed, no acute distress.  HEENT: Atraumatic,  anicteric sclera, extraocular motion intact, nares dry, mucus membranes moist.  CV: Normal rate, regular rhythm. S1, prominent S2.  No murmurs, rubs or gallops. JVD not seen.  Resp: Clear to auscultation bilaterally, no accessory muscle use.  Abdomen: Bowel sounds +, nondistended, soft, nontender, no hepatosplenomegaly, no masses.  Extremities: No peripheral edema, warm and well perfused, capillary refill < 2 seconds. Radial and DP pulses strong.  Skin: No rashes, bruising, or jaundice.  Neuro: Alert, oriented x 3, symmetric facial expressions, moving extremities equally  Psych: Appropriate affect.         Data:   Labs and Imaging Reviewed in Chart.    Pertinent studies as below:  Last Comprehensive Metabolic Panel:  Sodium   Date Value Ref Range Status   01/30/2019 135 133 - 144 mmol/L Final     Potassium   Date Value Ref Range Status   01/30/2019 4.3 3.4 - 5.3 mmol/L Final     Chloride   Date Value Ref Range Status   01/30/2019 104 94 - 109 mmol/L Final     Carbon Dioxide   Date Value Ref Range Status   01/30/2019 25 20 - 32 mmol/L Final     Anion Gap   Date Value Ref Range Status   01/30/2019 7 3 - 14 mmol/L Final     Glucose   Date Value Ref Range Status   01/30/2019 86 70 - 99 mg/dL Final     Urea Nitrogen   Date Value Ref Range Status   01/30/2019 13 7 - 30 mg/dL Final     Creatinine   Date Value Ref Range Status   01/30/2019 0.96 0.66 - 1.25 mg/dL Final     GFR Estimate   Date Value Ref Range Status   01/30/2019 88 >60 mL/min/[1.73_m2] Final     Comment:     Non  GFR Calc  Starting 12/18/2018, serum creatinine based estimated GFR (eGFR) will be   calculated using the Chronic Kidney Disease Epidemiology Collaboration   (CKD-EPI) equation.       Calcium   Date Value Ref Range Status   01/30/2019 8.7 8.5 - 10.1 mg/dL Final     Lab Results   Component Value Date    WBC 4.7 01/30/2019     Lab Results   Component Value Date    RBC 4.85 01/30/2019     Lab Results   Component Value Date    HGB 14.0  01/30/2019     Lab Results   Component Value Date    HCT 44.4 01/30/2019     Lab Results   Component Value Date    MCV 92 01/30/2019     Lab Results   Component Value Date    MCH 28.9 01/30/2019     Lab Results   Component Value Date    MCHC 31.5 01/30/2019     Lab Results   Component Value Date    RDW 15.0 01/30/2019     Lab Results   Component Value Date     01/30/2019     I personally saw and examined the patient with the resident and agree with the history and physical as noted.

## 2019-02-17 NOTE — Clinical Note
IABP inserted in the right femoral artery. IABP inserted with 50 cc balloon volume Lot number is: [id number]

## 2019-02-17 NOTE — Clinical Note
Potential access sites were evaluated for patency using ultrasound.   The right femoral vein and right jugular vein was selected.   Access was obtained under ultrasound guidance with direct visualization of needle entry.

## 2019-02-17 NOTE — PLAN OF CARE
Hospital day#1: swan placed this afternoon.  JOSEPHINE Q6 hours (4p): PAP 49/26, wedge 24; SV02 60; CO 3.2; CI 1.8.  Milrinone continued at same rate as at home (0.125) and hep gtt initiated.      Pending: 10a level due at 10pm, balloon placement tomorrow, tentative LVAD placement on Tuesday with potential to be listed for heart transplant.

## 2019-02-17 NOTE — Clinical Note
Potential access sites were evaluated for patency using ultrasound.   The right femoral vein was selected. Access was obtained under with Sonosite guidance using a standard 18 guage needle with direct visualization of needle entry.

## 2019-02-17 NOTE — Clinical Note
dry, intact, no bleeding and no hematoma. Left internal jugular site CDI/primapore in place. Right femoral vein site CDI

## 2019-02-18 ENCOUNTER — APPOINTMENT (OUTPATIENT)
Dept: GENERAL RADIOLOGY | Facility: CLINIC | Age: 54
DRG: 001 | End: 2019-02-18
Attending: INTERNAL MEDICINE
Payer: COMMERCIAL

## 2019-02-18 ENCOUNTER — APPOINTMENT (OUTPATIENT)
Dept: CT IMAGING | Facility: CLINIC | Age: 54
DRG: 001 | End: 2019-02-18
Attending: THORACIC SURGERY (CARDIOTHORACIC VASCULAR SURGERY)
Payer: COMMERCIAL

## 2019-02-18 LAB
ABSSPTEST METHOD: NORMAL
ALBUMIN UR-MCNC: NEGATIVE MG/DL
ANION GAP SERPL CALCULATED.3IONS-SCNC: 10 MMOL/L (ref 3–14)
ANION GAP SERPL CALCULATED.3IONS-SCNC: 9 MMOL/L (ref 3–14)
APPEARANCE UR: CLEAR
BASE DEFICIT BLDV-SCNC: 0.9 MMOL/L
BASE DEFICIT BLDV-SCNC: 2.4 MMOL/L
BASE EXCESS BLDV CALC-SCNC: 1.3 MMOL/L
BASE EXCESS BLDV CALC-SCNC: 2.7 MMOL/L
BILIRUB UR QL STRIP: NEGATIVE
BUN SERPL-MCNC: 11 MG/DL (ref 7–30)
BUN SERPL-MCNC: 13 MG/DL (ref 7–30)
CALCIUM SERPL-MCNC: 8.5 MG/DL (ref 8.5–10.1)
CALCIUM SERPL-MCNC: 8.6 MG/DL (ref 8.5–10.1)
CHLORIDE SERPL-SCNC: 101 MMOL/L (ref 94–109)
CHLORIDE SERPL-SCNC: 104 MMOL/L (ref 94–109)
CO2 SERPL-SCNC: 22 MMOL/L (ref 20–32)
CO2 SERPL-SCNC: 24 MMOL/L (ref 20–32)
COLOR UR AUTO: YELLOW
CREAT SERPL-MCNC: 0.86 MG/DL (ref 0.66–1.25)
CREAT SERPL-MCNC: 0.92 MG/DL (ref 0.66–1.25)
DRSSPDPA1*LOCUS: NORMAL
DRSSPDPB1*LOCUS: NORMAL
DRSSPDPB1*LOCUSNMDP: NORMAL
DRSSPDQA1*LOCUS: NORMAL
DRSSPDQB1* LOCUS: NORMAL
DRSSPDRB1* LOCUS: NORMAL
DRSSPTEST METHOD: NORMAL
EBV VCA IGG SER QL IA: >8 AI (ref 0–0.8)
ERYTHROCYTE [DISTWIDTH] IN BLOOD BY AUTOMATED COUNT: 13.9 % (ref 10–15)
GFR SERPL CREATININE-BSD FRML MDRD: >90 ML/MIN/{1.73_M2}
GFR SERPL CREATININE-BSD FRML MDRD: >90 ML/MIN/{1.73_M2}
GLUCOSE BLDC GLUCOMTR-MCNC: 102 MG/DL (ref 70–99)
GLUCOSE BLDC GLUCOMTR-MCNC: 63 MG/DL (ref 70–99)
GLUCOSE BLDC GLUCOMTR-MCNC: 68 MG/DL (ref 70–99)
GLUCOSE BLDC GLUCOMTR-MCNC: 69 MG/DL (ref 70–99)
GLUCOSE BLDC GLUCOMTR-MCNC: 71 MG/DL (ref 70–99)
GLUCOSE BLDC GLUCOMTR-MCNC: 79 MG/DL (ref 70–99)
GLUCOSE BLDC GLUCOMTR-MCNC: 83 MG/DL (ref 70–99)
GLUCOSE BLDC GLUCOMTR-MCNC: 92 MG/DL (ref 70–99)
GLUCOSE SERPL-MCNC: 68 MG/DL (ref 70–99)
GLUCOSE SERPL-MCNC: 68 MG/DL (ref 70–99)
GLUCOSE UR STRIP-MCNC: NEGATIVE MG/DL
HCO3 BLDV-SCNC: 22 MMOL/L (ref 21–28)
HCO3 BLDV-SCNC: 25 MMOL/L (ref 21–28)
HCO3 BLDV-SCNC: 26 MMOL/L (ref 21–28)
HCO3 BLDV-SCNC: 28 MMOL/L (ref 21–28)
HCT VFR BLD AUTO: 39.4 % (ref 40–53)
HGB BLD-MCNC: 12.7 G/DL (ref 13.3–17.7)
HGB UR QL STRIP: NEGATIVE
INTERPRETATION ECG - MUSE: NORMAL
KETONES UR STRIP-MCNC: NEGATIVE MG/DL
LEUKOCYTE ESTERASE UR QL STRIP: NEGATIVE
LMWH PPP CHRO-ACNC: 0.24 IU/ML
Lab: NORMAL
MAGNESIUM SERPL-MCNC: 1.9 MG/DL (ref 1.6–2.3)
MAGNESIUM SERPL-MCNC: 2 MG/DL (ref 1.6–2.3)
MAGNESIUM SERPL-MCNC: 2.1 MG/DL (ref 1.6–2.3)
MCH RBC QN AUTO: 30 PG (ref 26.5–33)
MCHC RBC AUTO-ENTMCNC: 32.2 G/DL (ref 31.5–36.5)
MCV RBC AUTO: 93 FL (ref 78–100)
NITRATE UR QL: NEGATIVE
O2/TOTAL GAS SETTING VFR VENT: 21 %
OXYHGB MFR BLDV: 52 %
OXYHGB MFR BLDV: 53 %
OXYHGB MFR BLDV: 58 %
OXYHGB MFR BLDV: 67 %
PCO2 BLDV: 36 MM HG (ref 40–50)
PCO2 BLDV: 40 MM HG (ref 40–50)
PCO2 BLDV: 43 MM HG (ref 40–50)
PCO2 BLDV: 44 MM HG (ref 40–50)
PH BLDV: 7.37 PH (ref 7.32–7.43)
PH BLDV: 7.39 PH (ref 7.32–7.43)
PH BLDV: 7.41 PH (ref 7.32–7.43)
PH BLDV: 7.42 PH (ref 7.32–7.43)
PH UR STRIP: 5.5 PH (ref 5–7)
PHOSPHATE SERPL-MCNC: 3.5 MG/DL (ref 2.5–4.5)
PLATELET # BLD AUTO: 158 10E9/L (ref 150–450)
PO2 BLDV: 29 MM HG (ref 25–47)
PO2 BLDV: 30 MM HG (ref 25–47)
PO2 BLDV: 33 MM HG (ref 25–47)
PO2 BLDV: 37 MM HG (ref 25–47)
POTASSIUM SERPL-SCNC: 3.2 MMOL/L (ref 3.4–5.3)
POTASSIUM SERPL-SCNC: 3.4 MMOL/L (ref 3.4–5.3)
POTASSIUM SERPL-SCNC: 4.1 MMOL/L (ref 3.4–5.3)
RADIOLOGIST FLAGS: NORMAL
RBC # BLD AUTO: 4.23 10E12/L (ref 4.4–5.9)
RBC #/AREA URNS AUTO: <1 /HPF (ref 0–2)
SA1 CELL: NORMAL
SA1 COMMENTS: NORMAL
SA1 HI RISK ABY: NORMAL
SA1 MOD RISK ABY: NORMAL
SA1 TEST METHOD: NORMAL
SA2 CELL: NORMAL
SA2 COMMENTS: NORMAL
SA2 HI RISK ABY UA: NORMAL
SA2 MOD RISK ABY: NORMAL
SA2 TEST METHOD: NORMAL
SODIUM SERPL-SCNC: 135 MMOL/L (ref 133–144)
SODIUM SERPL-SCNC: 136 MMOL/L (ref 133–144)
SOURCE: NORMAL
SP GR UR STRIP: 1.01 (ref 1–1.03)
SSPA* LOCUS: NORMAL
SSPA*: NORMAL
SSPB* LOCUS: NORMAL
SSPB*: NORMAL
SSPBW-1: NORMAL
SSPC* LOCUS: NORMAL
SSPC*: NORMAL
SSPTEST METHOD: NORMAL
UNACCEPTABLE ANTIGEN: NORMAL
UNOS CPRA: 0
UROBILINOGEN UR STRIP-MCNC: NORMAL MG/DL (ref 0–2)
WBC # BLD AUTO: 5.3 10E9/L (ref 4–11)
WBC #/AREA URNS AUTO: 1 /HPF (ref 0–5)

## 2019-02-18 PROCEDURE — 40000081 ZZH STATISTIC INSERT IABP

## 2019-02-18 PROCEDURE — 86850 RBC ANTIBODY SCREEN: CPT | Performed by: PHYSICIAN ASSISTANT

## 2019-02-18 PROCEDURE — 33967 INSERT I-AORT PERCUT DEVICE: CPT | Mod: GC | Performed by: INTERNAL MEDICINE

## 2019-02-18 PROCEDURE — 83735 ASSAY OF MAGNESIUM: CPT | Performed by: STUDENT IN AN ORGANIZED HEALTH CARE EDUCATION/TRAINING PROGRAM

## 2019-02-18 PROCEDURE — 25000125 ZZHC RX 250

## 2019-02-18 PROCEDURE — 84100 ASSAY OF PHOSPHORUS: CPT | Performed by: STUDENT IN AN ORGANIZED HEALTH CARE EDUCATION/TRAINING PROGRAM

## 2019-02-18 PROCEDURE — 20000004 ZZH R&B ICU UMMC

## 2019-02-18 PROCEDURE — 25000132 ZZH RX MED GY IP 250 OP 250 PS 637: Performed by: INTERNAL MEDICINE

## 2019-02-18 PROCEDURE — 80048 BASIC METABOLIC PNL TOTAL CA: CPT | Performed by: STUDENT IN AN ORGANIZED HEALTH CARE EDUCATION/TRAINING PROGRAM

## 2019-02-18 PROCEDURE — 27210305 ZZH CATH BALLOON IABP

## 2019-02-18 PROCEDURE — 40000048 ZZH STATISTIC DAILY SWAN MONITORING

## 2019-02-18 PROCEDURE — 25000132 ZZH RX MED GY IP 250 OP 250 PS 637: Performed by: STUDENT IN AN ORGANIZED HEALTH CARE EDUCATION/TRAINING PROGRAM

## 2019-02-18 PROCEDURE — 82805 BLOOD GASES W/O2 SATURATION: CPT | Performed by: STUDENT IN AN ORGANIZED HEALTH CARE EDUCATION/TRAINING PROGRAM

## 2019-02-18 PROCEDURE — 86923 COMPATIBILITY TEST ELECTRIC: CPT | Performed by: PHYSICIAN ASSISTANT

## 2019-02-18 PROCEDURE — 27210794 ZZH OR GENERAL SUPPLY STERILE: Performed by: INTERNAL MEDICINE

## 2019-02-18 PROCEDURE — 40000076 ZZH STATISTIC IABP MONITORING

## 2019-02-18 PROCEDURE — 00000146 ZZHCL STATISTIC GLUCOSE BY METER IP

## 2019-02-18 PROCEDURE — 71250 CT THORAX DX C-: CPT

## 2019-02-18 PROCEDURE — 85520 HEPARIN ASSAY: CPT | Performed by: STUDENT IN AN ORGANIZED HEALTH CARE EDUCATION/TRAINING PROGRAM

## 2019-02-18 PROCEDURE — 25000125 ZZHC RX 250: Performed by: STUDENT IN AN ORGANIZED HEALTH CARE EDUCATION/TRAINING PROGRAM

## 2019-02-18 PROCEDURE — 86901 BLOOD TYPING SEROLOGIC RH(D): CPT | Performed by: PHYSICIAN ASSISTANT

## 2019-02-18 PROCEDURE — 40000275 ZZH STATISTIC RCP TIME EA 10 MIN

## 2019-02-18 PROCEDURE — 25000128 H RX IP 250 OP 636: Performed by: INTERNAL MEDICINE

## 2019-02-18 PROCEDURE — 71045 X-RAY EXAM CHEST 1 VIEW: CPT

## 2019-02-18 PROCEDURE — 86900 BLOOD TYPING SEROLOGIC ABO: CPT | Performed by: PHYSICIAN ASSISTANT

## 2019-02-18 PROCEDURE — 25000128 H RX IP 250 OP 636: Performed by: STUDENT IN AN ORGANIZED HEALTH CARE EDUCATION/TRAINING PROGRAM

## 2019-02-18 PROCEDURE — 40000196 ZZH STATISTIC RAPCV CVP MONITORING

## 2019-02-18 PROCEDURE — 85027 COMPLETE CBC AUTOMATED: CPT | Performed by: STUDENT IN AN ORGANIZED HEALTH CARE EDUCATION/TRAINING PROGRAM

## 2019-02-18 PROCEDURE — 81001 URINALYSIS AUTO W/SCOPE: CPT | Performed by: PHYSICIAN ASSISTANT

## 2019-02-18 PROCEDURE — 33967 INSERT I-AORT PERCUT DEVICE: CPT | Performed by: INTERNAL MEDICINE

## 2019-02-18 PROCEDURE — 99152 MOD SED SAME PHYS/QHP 5/>YRS: CPT | Performed by: INTERNAL MEDICINE

## 2019-02-18 PROCEDURE — 84132 ASSAY OF SERUM POTASSIUM: CPT | Performed by: STUDENT IN AN ORGANIZED HEALTH CARE EDUCATION/TRAINING PROGRAM

## 2019-02-18 PROCEDURE — 5A02210 ASSISTANCE WITH CARDIAC OUTPUT USING BALLOON PUMP, CONTINUOUS: ICD-10-PCS | Performed by: INTERNAL MEDICINE

## 2019-02-18 PROCEDURE — 25000128 H RX IP 250 OP 636: Performed by: NURSE PRACTITIONER

## 2019-02-18 PROCEDURE — 99233 SBSQ HOSP IP/OBS HIGH 50: CPT | Mod: 25 | Performed by: INTERNAL MEDICINE

## 2019-02-18 RX ORDER — ADENOSINE 3 MG/ML
12-12000 INJECTION, SOLUTION INTRAVENOUS
Status: DISCONTINUED | OUTPATIENT
Start: 2019-02-18 | End: 2019-02-18 | Stop reason: HOSPADM

## 2019-02-18 RX ORDER — METOPROLOL TARTRATE 1 MG/ML
5 INJECTION, SOLUTION INTRAVENOUS EVERY 5 MIN PRN
Status: DISCONTINUED | OUTPATIENT
Start: 2019-02-18 | End: 2019-02-18 | Stop reason: HOSPADM

## 2019-02-18 RX ORDER — NICARDIPINE HYDROCHLORIDE 2.5 MG/ML
100-300 INJECTION INTRAVENOUS
Status: DISCONTINUED | OUTPATIENT
Start: 2019-02-18 | End: 2019-02-18 | Stop reason: HOSPADM

## 2019-02-18 RX ORDER — PRASUGREL 10 MG/1
10-60 TABLET, FILM COATED ORAL
Status: DISCONTINUED | OUTPATIENT
Start: 2019-02-18 | End: 2019-02-18 | Stop reason: HOSPADM

## 2019-02-18 RX ORDER — SODIUM NITROPRUSSIDE 25 MG/ML
100-200 INJECTION INTRAVENOUS
Status: DISCONTINUED | OUTPATIENT
Start: 2019-02-18 | End: 2019-02-18 | Stop reason: HOSPADM

## 2019-02-18 RX ORDER — NITROGLYCERIN 5 MG/ML
100-500 VIAL (ML) INTRAVENOUS
Status: DISCONTINUED | OUTPATIENT
Start: 2019-02-18 | End: 2019-02-18 | Stop reason: HOSPADM

## 2019-02-18 RX ORDER — CLOPIDOGREL BISULFATE 75 MG/1
300-600 TABLET ORAL
Status: DISCONTINUED | OUTPATIENT
Start: 2019-02-18 | End: 2019-02-18 | Stop reason: HOSPADM

## 2019-02-18 RX ORDER — NITROGLYCERIN 5 MG/ML
100-200 VIAL (ML) INTRAVENOUS
Status: DISCONTINUED | OUTPATIENT
Start: 2019-02-18 | End: 2019-02-18 | Stop reason: HOSPADM

## 2019-02-18 RX ORDER — TIROFIBAN HYDROCHLORIDE 50 UG/ML
0.15 INJECTION INTRAVENOUS CONTINUOUS PRN
Status: DISCONTINUED | OUTPATIENT
Start: 2019-02-18 | End: 2019-02-18 | Stop reason: HOSPADM

## 2019-02-18 RX ORDER — ATROPINE SULFATE 0.1 MG/ML
.5-1 INJECTION INTRAVENOUS
Status: DISCONTINUED | OUTPATIENT
Start: 2019-02-18 | End: 2019-02-18 | Stop reason: HOSPADM

## 2019-02-18 RX ORDER — POTASSIUM CHLORIDE 7.45 MG/ML
10 INJECTION INTRAVENOUS
Status: DISCONTINUED | OUTPATIENT
Start: 2019-02-18 | End: 2019-02-18 | Stop reason: HOSPADM

## 2019-02-18 RX ORDER — NIFEDIPINE 10 MG/1
10 CAPSULE ORAL
Status: DISCONTINUED | OUTPATIENT
Start: 2019-02-18 | End: 2019-02-18 | Stop reason: HOSPADM

## 2019-02-18 RX ORDER — DOBUTAMINE HYDROCHLORIDE 200 MG/100ML
2-20 INJECTION INTRAVENOUS CONTINUOUS PRN
Status: DISCONTINUED | OUTPATIENT
Start: 2019-02-18 | End: 2019-02-18 | Stop reason: HOSPADM

## 2019-02-18 RX ORDER — FENTANYL CITRATE 50 UG/ML
25-50 INJECTION, SOLUTION INTRAMUSCULAR; INTRAVENOUS
Status: DISCONTINUED | OUTPATIENT
Start: 2019-02-18 | End: 2019-02-18 | Stop reason: HOSPADM

## 2019-02-18 RX ORDER — HYDRALAZINE HYDROCHLORIDE 20 MG/ML
10-20 INJECTION INTRAMUSCULAR; INTRAVENOUS
Status: DISCONTINUED | OUTPATIENT
Start: 2019-02-18 | End: 2019-02-18 | Stop reason: HOSPADM

## 2019-02-18 RX ORDER — CLOPIDOGREL BISULFATE 75 MG/1
75 TABLET ORAL
Status: DISCONTINUED | OUTPATIENT
Start: 2019-02-18 | End: 2019-02-18 | Stop reason: HOSPADM

## 2019-02-18 RX ORDER — MAGNESIUM SULFATE HEPTAHYDRATE 40 MG/ML
4 INJECTION, SOLUTION INTRAVENOUS EVERY 4 HOURS PRN
Status: DISCONTINUED | OUTPATIENT
Start: 2019-02-18 | End: 2019-03-08 | Stop reason: ALTCHOICE

## 2019-02-18 RX ORDER — ENALAPRILAT 1.25 MG/ML
1.25-2.5 INJECTION INTRAVENOUS
Status: DISCONTINUED | OUTPATIENT
Start: 2019-02-18 | End: 2019-02-18 | Stop reason: HOSPADM

## 2019-02-18 RX ORDER — PROTAMINE SULFATE 10 MG/ML
25-100 INJECTION, SOLUTION INTRAVENOUS EVERY 5 MIN PRN
Status: DISCONTINUED | OUTPATIENT
Start: 2019-02-18 | End: 2019-02-18 | Stop reason: HOSPADM

## 2019-02-18 RX ORDER — LIDOCAINE HYDROCHLORIDE 10 MG/ML
30 INJECTION, SOLUTION EPIDURAL; INFILTRATION; INTRACAUDAL; PERINEURAL
Status: DISCONTINUED | OUTPATIENT
Start: 2019-02-18 | End: 2019-02-18 | Stop reason: HOSPADM

## 2019-02-18 RX ORDER — NITROGLYCERIN 20 MG/100ML
.07-2 INJECTION INTRAVENOUS CONTINUOUS PRN
Status: DISCONTINUED | OUTPATIENT
Start: 2019-02-18 | End: 2019-02-18 | Stop reason: HOSPADM

## 2019-02-18 RX ORDER — DIPHENHYDRAMINE HYDROCHLORIDE 50 MG/ML
25-50 INJECTION INTRAMUSCULAR; INTRAVENOUS
Status: DISCONTINUED | OUTPATIENT
Start: 2019-02-18 | End: 2019-02-18 | Stop reason: HOSPADM

## 2019-02-18 RX ORDER — LEVOFLOXACIN 5 MG/ML
500 INJECTION, SOLUTION INTRAVENOUS SEE ADMIN INSTRUCTIONS
Status: CANCELLED | OUTPATIENT
Start: 2019-02-18

## 2019-02-18 RX ORDER — ONDANSETRON 2 MG/ML
4 INJECTION INTRAMUSCULAR; INTRAVENOUS EVERY 4 HOURS PRN
Status: DISCONTINUED | OUTPATIENT
Start: 2019-02-18 | End: 2019-02-18 | Stop reason: HOSPADM

## 2019-02-18 RX ORDER — HEPARIN SODIUM 1000 [USP'U]/ML
1000-10000 INJECTION, SOLUTION INTRAVENOUS; SUBCUTANEOUS EVERY 5 MIN PRN
Status: DISCONTINUED | OUTPATIENT
Start: 2019-02-18 | End: 2019-02-18 | Stop reason: HOSPADM

## 2019-02-18 RX ORDER — MAGNESIUM SULFATE HEPTAHYDRATE 40 MG/ML
2 INJECTION, SOLUTION INTRAVENOUS DAILY PRN
Status: DISCONTINUED | OUTPATIENT
Start: 2019-02-18 | End: 2019-03-08 | Stop reason: ALTCHOICE

## 2019-02-18 RX ORDER — DEXTROSE MONOHYDRATE 25 G/50ML
12.5-5 INJECTION, SOLUTION INTRAVENOUS EVERY 30 MIN PRN
Status: DISCONTINUED | OUTPATIENT
Start: 2019-02-18 | End: 2019-02-18 | Stop reason: HOSPADM

## 2019-02-18 RX ORDER — POTASSIUM CHLORIDE 29.8 MG/ML
20 INJECTION INTRAVENOUS
Status: DISCONTINUED | OUTPATIENT
Start: 2019-02-18 | End: 2019-02-18 | Stop reason: HOSPADM

## 2019-02-18 RX ORDER — VERAPAMIL HYDROCHLORIDE 2.5 MG/ML
1-5 INJECTION, SOLUTION INTRAVENOUS
Status: DISCONTINUED | OUTPATIENT
Start: 2019-02-18 | End: 2019-02-18 | Stop reason: HOSPADM

## 2019-02-18 RX ORDER — FLUCONAZOLE 2 MG/ML
200 INJECTION, SOLUTION INTRAVENOUS
Status: CANCELLED | OUTPATIENT
Start: 2019-02-18

## 2019-02-18 RX ORDER — ARGATROBAN 1 MG/ML
350 INJECTION, SOLUTION INTRAVENOUS
Status: DISCONTINUED | OUTPATIENT
Start: 2019-02-18 | End: 2019-02-18 | Stop reason: HOSPADM

## 2019-02-18 RX ORDER — FLUMAZENIL 0.1 MG/ML
0.2 INJECTION, SOLUTION INTRAVENOUS
Status: DISCONTINUED | OUTPATIENT
Start: 2019-02-18 | End: 2019-02-18 | Stop reason: HOSPADM

## 2019-02-18 RX ORDER — EPTIFIBATIDE 2 MG/ML
180 INJECTION, SOLUTION INTRAVENOUS EVERY 10 MIN PRN
Status: DISCONTINUED | OUTPATIENT
Start: 2019-02-18 | End: 2019-02-18 | Stop reason: HOSPADM

## 2019-02-18 RX ORDER — PROTAMINE SULFATE 10 MG/ML
1-5 INJECTION, SOLUTION INTRAVENOUS
Status: DISCONTINUED | OUTPATIENT
Start: 2019-02-18 | End: 2019-02-18 | Stop reason: HOSPADM

## 2019-02-18 RX ORDER — LEVOFLOXACIN 5 MG/ML
500 INJECTION, SOLUTION INTRAVENOUS
Status: CANCELLED | OUTPATIENT
Start: 2019-02-18

## 2019-02-18 RX ORDER — ARGATROBAN 1 MG/ML
150 INJECTION, SOLUTION INTRAVENOUS
Status: DISCONTINUED | OUTPATIENT
Start: 2019-02-18 | End: 2019-02-18 | Stop reason: HOSPADM

## 2019-02-18 RX ORDER — VANCOMYCIN HYDROCHLORIDE 1 G/200ML
1000 INJECTION, SOLUTION INTRAVENOUS SEE ADMIN INSTRUCTIONS
Status: CANCELLED | OUTPATIENT
Start: 2019-02-18

## 2019-02-18 RX ORDER — ASPIRIN 81 MG/1
81-324 TABLET, CHEWABLE ORAL
Status: DISCONTINUED | OUTPATIENT
Start: 2019-02-18 | End: 2019-02-18 | Stop reason: HOSPADM

## 2019-02-18 RX ORDER — FUROSEMIDE 10 MG/ML
20-100 INJECTION INTRAMUSCULAR; INTRAVENOUS
Status: DISCONTINUED | OUTPATIENT
Start: 2019-02-18 | End: 2019-02-18 | Stop reason: HOSPADM

## 2019-02-18 RX ORDER — NALOXONE HYDROCHLORIDE 0.4 MG/ML
0.4 INJECTION, SOLUTION INTRAMUSCULAR; INTRAVENOUS; SUBCUTANEOUS EVERY 5 MIN PRN
Status: DISCONTINUED | OUTPATIENT
Start: 2019-02-18 | End: 2019-02-18 | Stop reason: HOSPADM

## 2019-02-18 RX ORDER — NITROGLYCERIN 0.4 MG/1
0.4 TABLET SUBLINGUAL EVERY 5 MIN PRN
Status: DISCONTINUED | OUTPATIENT
Start: 2019-02-18 | End: 2019-02-18 | Stop reason: HOSPADM

## 2019-02-18 RX ORDER — FUROSEMIDE 10 MG/ML
80 INJECTION INTRAMUSCULAR; INTRAVENOUS ONCE
Status: COMPLETED | OUTPATIENT
Start: 2019-02-18 | End: 2019-02-18

## 2019-02-18 RX ORDER — VANCOMYCIN HYDROCHLORIDE 1 G/200ML
1000 INJECTION, SOLUTION INTRAVENOUS
Status: CANCELLED | OUTPATIENT
Start: 2019-02-18

## 2019-02-18 RX ORDER — LIDOCAINE HYDROCHLORIDE 10 MG/ML
INJECTION, SOLUTION EPIDURAL; INFILTRATION; INTRACAUDAL; PERINEURAL
Status: COMPLETED
Start: 2019-02-18 | End: 2019-02-18

## 2019-02-18 RX ORDER — ASPIRIN 325 MG
325 TABLET ORAL
Status: DISCONTINUED | OUTPATIENT
Start: 2019-02-18 | End: 2019-02-18 | Stop reason: HOSPADM

## 2019-02-18 RX ORDER — METHYLPREDNISOLONE SODIUM SUCCINATE 125 MG/2ML
125 INJECTION, POWDER, LYOPHILIZED, FOR SOLUTION INTRAMUSCULAR; INTRAVENOUS
Status: DISCONTINUED | OUTPATIENT
Start: 2019-02-18 | End: 2019-02-18 | Stop reason: HOSPADM

## 2019-02-18 RX ORDER — LORAZEPAM 2 MG/ML
.5-2 INJECTION INTRAMUSCULAR EVERY 4 HOURS PRN
Status: DISCONTINUED | OUTPATIENT
Start: 2019-02-18 | End: 2019-02-18 | Stop reason: HOSPADM

## 2019-02-18 RX ORDER — EPTIFIBATIDE 2 MG/ML
2 INJECTION, SOLUTION INTRAVENOUS CONTINUOUS PRN
Status: DISCONTINUED | OUTPATIENT
Start: 2019-02-18 | End: 2019-02-18 | Stop reason: HOSPADM

## 2019-02-18 RX ADMIN — FLUTICASONE FUROATE 1 PUFF: 200 POWDER RESPIRATORY (INHALATION) at 08:27

## 2019-02-18 RX ADMIN — AMIODARONE HYDROCHLORIDE 200 MG: 200 TABLET ORAL at 08:14

## 2019-02-18 RX ADMIN — DULOXETINE 20 MG: 20 CAPSULE, DELAYED RELEASE ORAL at 08:15

## 2019-02-18 RX ADMIN — MILRINONE LACTATE 0.3 MCG/KG/MIN: 200 INJECTION, SOLUTION INTRAVENOUS at 17:26

## 2019-02-18 RX ADMIN — FUROSEMIDE 40 MG: 20 TABLET ORAL at 08:15

## 2019-02-18 RX ADMIN — MONTELUKAST SODIUM 10 MG: 10 TABLET, COATED ORAL at 21:46

## 2019-02-18 RX ADMIN — OMEPRAZOLE 40 MG: 20 CAPSULE, DELAYED RELEASE ORAL at 21:45

## 2019-02-18 RX ADMIN — FENTANYL CITRATE 50 MCG: 50 INJECTION, SOLUTION INTRAMUSCULAR; INTRAVENOUS at 18:05

## 2019-02-18 RX ADMIN — MESALAMINE 2400 MG: 800 TABLET, DELAYED RELEASE ORAL at 21:45

## 2019-02-18 RX ADMIN — MIDAZOLAM 1 MG: 1 INJECTION INTRAMUSCULAR; INTRAVENOUS at 18:05

## 2019-02-18 RX ADMIN — POTASSIUM CHLORIDE 20 MEQ: 29.8 INJECTION, SOLUTION INTRAVENOUS at 03:17

## 2019-02-18 RX ADMIN — HEPARIN SODIUM 900 UNITS/HR: 10000 INJECTION, SOLUTION INTRAVENOUS at 15:43

## 2019-02-18 RX ADMIN — LEVOTHYROXINE SODIUM 100 MCG: 100 TABLET ORAL at 08:15

## 2019-02-18 RX ADMIN — MESALAMINE 2400 MG: 800 TABLET, DELAYED RELEASE ORAL at 08:16

## 2019-02-18 RX ADMIN — FUROSEMIDE 5 MG/HR: 10 INJECTION, SOLUTION INTRAVENOUS at 13:01

## 2019-02-18 RX ADMIN — Medication 1 MG: at 00:07

## 2019-02-18 RX ADMIN — Medication 1 MG: at 21:45

## 2019-02-18 RX ADMIN — MAGNESIUM SULFATE HEPTAHYDRATE 2 G: 40 INJECTION, SOLUTION INTRAVENOUS at 21:16

## 2019-02-18 RX ADMIN — POTASSIUM CHLORIDE 20 MEQ: 29.8 INJECTION, SOLUTION INTRAVENOUS at 02:23

## 2019-02-18 RX ADMIN — SENNOSIDES AND DOCUSATE SODIUM 2 TABLET: 8.6; 5 TABLET ORAL at 08:29

## 2019-02-18 RX ADMIN — POTASSIUM CHLORIDE 20 MEQ: 29.8 INJECTION, SOLUTION INTRAVENOUS at 16:34

## 2019-02-18 RX ADMIN — Medication 12.5 MG: at 08:15

## 2019-02-18 RX ADMIN — FUROSEMIDE 40 MG: 10 INJECTION, SOLUTION INTRAVENOUS at 13:00

## 2019-02-18 RX ADMIN — ACETAMINOPHEN 650 MG: 325 TABLET, FILM COATED ORAL at 21:46

## 2019-02-18 RX ADMIN — MIDAZOLAM 1 MG: 1 INJECTION INTRAMUSCULAR; INTRAVENOUS at 18:12

## 2019-02-18 RX ADMIN — LIDOCAINE HYDROCHLORIDE: 10 INJECTION, SOLUTION EPIDURAL; INFILTRATION; INTRACAUDAL; PERINEURAL at 15:43

## 2019-02-18 RX ADMIN — FENTANYL CITRATE 50 MCG: 50 INJECTION, SOLUTION INTRAMUSCULAR; INTRAVENOUS at 18:16

## 2019-02-18 RX ADMIN — ACETAMINOPHEN 650 MG: 325 TABLET, FILM COATED ORAL at 03:08

## 2019-02-18 RX ADMIN — TAMSULOSIN HYDROCHLORIDE 0.4 MG: 0.4 CAPSULE ORAL at 16:32

## 2019-02-18 ASSESSMENT — ACTIVITIES OF DAILY LIVING (ADL)
ADLS_ACUITY_SCORE: 11

## 2019-02-18 ASSESSMENT — MIFFLIN-ST. JEOR: SCORE: 1412.78

## 2019-02-18 ASSESSMENT — PAIN DESCRIPTION - DESCRIPTORS
DESCRIPTORS: DISCOMFORT
DESCRIPTORS: ACHING;DISCOMFORT
DESCRIPTORS: DISCOMFORT

## 2019-02-18 NOTE — PLAN OF CARE
D: 54 yo gentleman with a h/o NICM, HFrEF (EF 15%) s/p ICD placement (2008, generator change 2018), congenital ASD repair in childhood, paroxysmal atrial fibrillation s/p ablation and cardioversion, hypothyroidism, EtOH abuse, ulcerative colitis, remote history of GI bleed with splenic embolization, and a recent admission in 11/2018 for ambulatory cardiogenic shock requiring initiation of IV inotropes, who was admitted for LVAD placement vs heart transplantation.    I/A:   NEURO: PERRL, AOx4, follows commands with equal strength bilaterally. C/o pain in R neck from [place], Tylenol given with good effect.     CARDIAC: 75% V paced with frequent PVCs, HR 60-80s, SBP , Afebrile.  CVP 10-13, PA 50/26 and 49/26, Jaden CO 3.2, CI 1.9.     RESP: Lungs clear/diminished. 2L NC at 0500 for desat to 85% while asleep.     GI: 2 g Sodium Diet.  2L fluid restriction. Last BM 2/16 per pt report.     : Voids with urinal at bedside, adequate output.      SKIN: Bruising on L arm.     LINES:  R 1-lumen PICC, [place]  Milrinone at 0.125 (home dose) and Heparin at 900 units/hr     LAB: K+ (3.4) replaced, recheck 4.1. 10A 0.24, heparin therapeutic, recheck next am. Blood sugar 68 this am, 15 g carbs (apple juice) given, recheck 79.     Please see flowsheets for for vitals and assessments.    P: Place balloon pump today as add on, possible LVAD and list for heart. Continue to monitor and treat as ordered. Offer support to patient and family as able.

## 2019-02-18 NOTE — PROGRESS NOTES
D: Stopped by to see patient on 4E.   I: Discussed POC and provided support and listened to patient and care giver's thoughts and concerns.  P: Later today, the plan will be finalized for VAD implant vs subclavian IABP placement. Continue to follow patient and address any questions or concerns patient and or caregiver may have.

## 2019-02-18 NOTE — PROGRESS NOTES
CVTS    Patient tentatively scheduled for LVAD implant tomorrow (2/19) at 1030am. Pre-op orders placed, pre-op teaching completed. Will have IABP placement this PM. Other cares per cards 2.     Fredrick Strickland PA-C  Cardiothoracic Surgery  February 18, 2019 2:50 PM   p: [phone]

## 2019-02-18 NOTE — PLAN OF CARE
Transplant Evaluation:  Today I met with Everton to discuss the possibility of being listed for a heart transplant.  He was given a copy of the [facility] brochure on multiple waitlisting, the  Heart Transplant booklets, and the program's current survival statistics, as published in Oct. 2018.  We also discussed some topics as part of transplant teaching, including the listing priority categories, PRA blood tests, donor issues, etc.  Will try to meet again with Everton when his parents are available, as they will be his primary support team.

## 2019-02-18 NOTE — PROGRESS NOTES
Cardiology Progress Note  Everton Larios MRN: [id number]  Age: 53 year old, : 1965  02/18/19            Changes Today:     - increase milrinone to 0.25 mcg/kg/min  - switch PTA lasix to lasix 5 mg/hr + 40 mg IV bolus  - BMP BID  - IABP today  - continue to monitor [place] numbers Q6H        Assessment and Plan:     Everton Larios is a 54 yo gentleman with a h/o NICM, HFrEF (EF 15%) s/p ICD placement (, generator change ), congenital ASD repair in childhood, paroxysmal atrial fibrillation s/p ablation and cardioversion, hypothyroidism, EtOH abuse, ulcerative colitis, remote history of GI bleed with splenic embolization, and a recent admission in 2018 for ambulatory cardiogenic shock requiring initiation of IV inotropes, who was admitted for LVAD placement vs heart transplantation.     # Chronic decompensated systolic heart failure  # Ambulatory cardiogenic shock, inotrope dependent  # NICM s/p ICD  # H/o congenital ASD repair  NYHA class: IV  AHA stage: D  Etiology: NICM     Patient notes that when fluid builds up, it is usually in his abdomen, but is currently asymptomatic. Has been short of breath with at least 2 blocks of ambulation. Currently undergoing process of LVAD vs heart transplant now that work up has been completed. [place] numbers on admission showed CVP 11, CI 3.2, PCWP 24, and SVR 1600; PCWP slightly elevated from last RHC.    - increase milrinone to 0.25 mcg/kg/min  - switch PTA lasix to lasix 5 mg/hr + 40 mg IV bolus  - BMP BID  - continue spironolactone 12.5 mg daily  - IABP today  - continue to monitor [place] numbers Q6H  - electrolyte protocol ordered, held PTA KCl  - will update transplant status after balloon pump and new hemodynamics     # Paroxysmal Afib s/p ablation  - continue low dose heparin gtt  - held PTA warfarin  - continue PTA amiodarone 200 mg daily  - not on beta-blocker at this time     # Hypothyroidism: continue PTA levothyroxine  #  Ulcerative colitis: continue PTA mesalamine  # GERD: continue PTA omeprazole  # BPH: continue PTA tamsulosin  # Depression: continue PTA duloxetine  # H/o EtOH abuse: currently denies any EtOH since 4 months ago    FEN: 2g Na, 2L fluid restriction, NPO at MN for IABP 2/18  Ppx: heparin gtt  LDA: PIV, R PICC, RIJ PA catheter  Dispo: Pending LVAD vs heart transplant this admission     CODE: Full     Patient was seen and discussed with Dr. Beck, who agrees with the assessment and plan.    Saud Jaeger MD  Internal Medicine PGY-2  Pager [phone]          Subjective     RN notes reviewed, NAEO. He note some abdominal bloating, but otherwise feels well. No shortness of breath or chest pain. He is wondering when the balloon pump will be placed.          Objective     Vitals:  Temp:  [96.3  F (35.7  C)-98.3  F (36.8  C)] 97.7  F (36.5  C)  Pulse:  [62-87] 66  Heart Rate:  [60-88] 68  Resp:  [11-28] 20  BP: ()/(49-80) 98/65  SpO2:  [86 %-98 %] 86 %    Vitals:    02/17/19 1330 02/18/19 0500   Weight: 63.5 kg (140 lb 1.6 oz) 60.3 kg (133 lb)     [place] (2/18): CVP 12, PA 50/28, Jaden CO 2.7, Jaden CI 2.7, SVR 2048, SvO2 53, SaO2 98     General:  Alert, sitting up in bed, no acute distress.  HEENT: Atraumatic, anicteric sclera, extraocular motion intact, nares dry, mucus membranes moist.  CV: Normal rate, regular rhythm. S1, prominent S2.  No murmurs, rubs or gallops. JVD not seen.  Resp: Clear to auscultation bilaterally, no accessory muscle use.  Abdomen: Bowel sounds +, soft, nontender, no hepatosplenomegaly, no masses.  Extremities: No peripheral edema, warm and well perfused, capillary refill < 2 seconds. Radial and DP pulses strong.  Skin: No rashes, bruising, or jaundice.  Neuro: Alert, oriented x 3, symmetric facial expressions, moving extremities equally  Psych: Pleasant affect.          Data:     Last Comprehensive Metabolic Panel:  Sodium   Date Value Ref Range Status   02/18/2019 136 133 - 144  mmol/L Final     Potassium   Date Value Ref Range Status   02/18/2019 4.1 3.4 - 5.3 mmol/L Final     Chloride   Date Value Ref Range Status   02/18/2019 104 94 - 109 mmol/L Final     Carbon Dioxide   Date Value Ref Range Status   02/18/2019 22 20 - 32 mmol/L Final     Anion Gap   Date Value Ref Range Status   02/18/2019 9 3 - 14 mmol/L Final     Glucose   Date Value Ref Range Status   02/18/2019 68 (L) 70 - 99 mg/dL Final     Urea Nitrogen   Date Value Ref Range Status   02/18/2019 13 7 - 30 mg/dL Final     Creatinine   Date Value Ref Range Status   02/18/2019 0.92 0.66 - 1.25 mg/dL Final     GFR Estimate   Date Value Ref Range Status   02/18/2019 >90 >60 mL/min/[1.73_m2] Final     Comment:     Non  GFR Calc  Starting 12/18/2018, serum creatinine based estimated GFR (eGFR) will be   calculated using the Chronic Kidney Disease Epidemiology Collaboration   (CKD-EPI) equation.       Calcium   Date Value Ref Range Status   02/18/2019 8.5 8.5 - 10.1 mg/dL Final     Lab Results   Component Value Date    WBC 5.3 02/18/2019     Lab Results   Component Value Date    RBC 4.23 02/18/2019     Lab Results   Component Value Date    HGB 12.7 02/18/2019     Lab Results   Component Value Date    HCT 39.4 02/18/2019     Lab Results   Component Value Date    MCV 93 02/18/2019     Lab Results   Component Value Date    MCH 30.0 02/18/2019     Lab Results   Component Value Date    MCHC 32.2 02/18/2019     Lab Results   Component Value Date    RDW 13.9 02/18/2019     Lab Results   Component Value Date     02/18/2019     Chest x-ray (2/18): imaging reviewed, PA catheter in good position, no acute cardiopulmonary changes.        Medications     Medications    amiodarone  200 mg Oral Daily     DULoxetine  20 mg Oral Daily     fluticasone  1 puff Inhalation Daily     furosemide  40 mg Oral BID     levothyroxine  100 mcg Oral Daily     mesalamine  2,400 mg Oral BID     montelukast  10 mg Oral At Bedtime     omeprazole   40 mg Oral QPM     sodium chloride (PF)  3 mL Intracatheter Q8H     spironolactone  12.5 mg Oral Daily     tamsulosin  0.4 mg Oral Daily with supper       HEParin 900 Units/hr (02/17/19 2190)     - MEDICATION INSTRUCTIONS -       milrinone 0.125 mcg/kg/min (02/17/19 2000)     - MEDICATION INSTRUCTIONS -         Critical Care ICU Note - Cardiology  Rosendo Beck M.D.        The patient remains unstable in the ICU with on-going need for ventilator support, parenteral medications for the adjustment of blood pressure and cardiac output and maintenance of renal function.      The patient is seen for shock requiring vasopressor and or inotropic agents; low cardiac output necessitating  inotropic agents, vasopressors and afterload reducing agents; limited mechanical support requiring IABP; I personally reviewed:    Arterial and venous blood gases to assess acid base balance, oxygenation, and ventilator settings.      Hemodynamic parameters obtained by central hemodynamic monitoring including RAP, estimated LVEDP, pulmonary artery pressure, cardiac output and vascular resistances in order to adjust fluids and infused medications for blood pressure and cardiac output maintenance.    Volume status, renal function and nutritional support as judged by intake, output, daily weight and appropriate laboratories.    I reviewed individual and serial imaging studies including echocardiogram, chest x-ray, CT scan    I personally supervised the prescription of parenteral fluids, inotropes, vasodilators , and vasopressors in order to correct cardiac output, maintain urine output and renal function.    I personally met with patient or family to discuss current and future diagnostic and therapeutic strategies    ICU 35 minutes

## 2019-02-19 ENCOUNTER — APPOINTMENT (OUTPATIENT)
Dept: GENERAL RADIOLOGY | Facility: CLINIC | Age: 54
DRG: 001 | End: 2019-02-19
Attending: THORACIC SURGERY (CARDIOTHORACIC VASCULAR SURGERY)
Payer: COMMERCIAL

## 2019-02-19 ENCOUNTER — COMMUNICATION - HEALTHEAST (OUTPATIENT)
Dept: ANTICOAGULATION | Facility: CLINIC | Age: 54
End: 2019-02-19

## 2019-02-19 ENCOUNTER — APPOINTMENT (OUTPATIENT)
Dept: GENERAL RADIOLOGY | Facility: CLINIC | Age: 54
DRG: 001 | End: 2019-02-19
Attending: PHYSICIAN ASSISTANT
Payer: COMMERCIAL

## 2019-02-19 ENCOUNTER — APPOINTMENT (OUTPATIENT)
Dept: GENERAL RADIOLOGY | Facility: CLINIC | Age: 54
DRG: 001 | End: 2019-02-19
Attending: STUDENT IN AN ORGANIZED HEALTH CARE EDUCATION/TRAINING PROGRAM
Payer: COMMERCIAL

## 2019-02-19 ENCOUNTER — ANESTHESIA EVENT (OUTPATIENT)
Dept: SURGERY | Facility: CLINIC | Age: 54
DRG: 001 | End: 2019-02-19
Payer: COMMERCIAL

## 2019-02-19 ENCOUNTER — ANESTHESIA (OUTPATIENT)
Dept: SURGERY | Facility: CLINIC | Age: 54
DRG: 001 | End: 2019-02-19
Payer: COMMERCIAL

## 2019-02-19 DIAGNOSIS — I48.92 ATRIAL FLUTTER (H): ICD-10-CM

## 2019-02-19 DIAGNOSIS — Q21.10 ASD (ATRIAL SEPTAL DEFECT): ICD-10-CM

## 2019-02-19 DIAGNOSIS — I42.8 NON-ISCHEMIC CARDIOMYOPATHY (H): ICD-10-CM

## 2019-02-19 DIAGNOSIS — I48.91 ATRIAL FIBRILLATION (H): ICD-10-CM

## 2019-02-19 LAB
ABO + RH BLD: NORMAL
ABO + RH BLD: NORMAL
ANION GAP SERPL CALCULATED.3IONS-SCNC: 10 MMOL/L (ref 3–14)
ANION GAP SERPL CALCULATED.3IONS-SCNC: 8 MMOL/L (ref 3–14)
BASE DEFICIT BLDA-SCNC: 2.4 MMOL/L
BASE DEFICIT BLDV-SCNC: 3.2 MMOL/L
BASE DEFICIT BLDV-SCNC: 3.7 MMOL/L
BASE EXCESS BLDV CALC-SCNC: 1.4 MMOL/L
BLD GP AB SCN SERPL QL: NORMAL
BLD PROD TYP BPU: NORMAL
BLD UNIT ID BPU: 0
BLD UNIT ID BPU: 0
BLOOD BANK CMNT PATIENT-IMP: NORMAL
BLOOD PRODUCT CODE: NORMAL
BLOOD PRODUCT CODE: NORMAL
BPU ID: NORMAL
BPU ID: NORMAL
BUN SERPL-MCNC: 12 MG/DL (ref 7–30)
BUN SERPL-MCNC: 13 MG/DL (ref 7–30)
CA-I BLD-MCNC: 4.7 MG/DL (ref 4.4–5.2)
CALCIUM SERPL-MCNC: 7.6 MG/DL (ref 8.5–10.1)
CALCIUM SERPL-MCNC: 8.5 MG/DL (ref 8.5–10.1)
CHLORIDE SERPL-SCNC: 101 MMOL/L (ref 94–109)
CHLORIDE SERPL-SCNC: 108 MMOL/L (ref 94–109)
CO2 SERPL-SCNC: 20 MMOL/L (ref 20–32)
CO2 SERPL-SCNC: 26 MMOL/L (ref 20–32)
CREAT SERPL-MCNC: 0.77 MG/DL (ref 0.66–1.25)
CREAT SERPL-MCNC: 0.96 MG/DL (ref 0.66–1.25)
ERYTHROCYTE [DISTWIDTH] IN BLOOD BY AUTOMATED COUNT: 13.4 % (ref 10–15)
ERYTHROCYTE [DISTWIDTH] IN BLOOD BY AUTOMATED COUNT: 13.6 % (ref 10–15)
GFR SERPL CREATININE-BSD FRML MDRD: 88 ML/MIN/{1.73_M2}
GFR SERPL CREATININE-BSD FRML MDRD: >90 ML/MIN/{1.73_M2}
GLUCOSE BLD-MCNC: 80 MG/DL (ref 70–99)
GLUCOSE BLDC GLUCOMTR-MCNC: 75 MG/DL (ref 70–99)
GLUCOSE BLDC GLUCOMTR-MCNC: 91 MG/DL (ref 70–99)
GLUCOSE SERPL-MCNC: 74 MG/DL (ref 70–99)
GLUCOSE SERPL-MCNC: 85 MG/DL (ref 70–99)
HCO3 BLD-SCNC: 23 MMOL/L (ref 21–28)
HCO3 BLDV-SCNC: 23 MMOL/L (ref 21–28)
HCO3 BLDV-SCNC: 24 MMOL/L (ref 21–28)
HCO3 BLDV-SCNC: 27 MMOL/L (ref 21–28)
HCT VFR BLD AUTO: 40.6 % (ref 40–53)
HCT VFR BLD AUTO: 40.7 % (ref 40–53)
HGB BLD-MCNC: 13.1 G/DL (ref 13.3–17.7)
HGB BLD-MCNC: 13.3 G/DL (ref 13.3–17.7)
HGB BLD-MCNC: 14.1 G/DL (ref 13.3–17.7)
LMWH PPP CHRO-ACNC: <0.1 IU/ML
MAGNESIUM SERPL-MCNC: 2 MG/DL (ref 1.6–2.3)
MAGNESIUM SERPL-MCNC: 2.5 MG/DL (ref 1.6–2.3)
MCH RBC QN AUTO: 30 PG (ref 26.5–33)
MCH RBC QN AUTO: 30.1 PG (ref 26.5–33)
MCHC RBC AUTO-ENTMCNC: 32.3 G/DL (ref 31.5–36.5)
MCHC RBC AUTO-ENTMCNC: 32.7 G/DL (ref 31.5–36.5)
MCV RBC AUTO: 92 FL (ref 78–100)
MCV RBC AUTO: 93 FL (ref 78–100)
NUM BPU REQUESTED: 2
O2/TOTAL GAS SETTING VFR VENT: 50 %
O2/TOTAL GAS SETTING VFR VENT: 50 %
O2/TOTAL GAS SETTING VFR VENT: ABNORMAL %
O2/TOTAL GAS SETTING VFR VENT: NORMAL %
OXYHGB MFR BLDV: 71 %
OXYHGB MFR BLDV: 75 %
OXYHGB MFR BLDV: 76 %
PCO2 BLD: 42 MM HG (ref 35–45)
PCO2 BLDV: 45 MM HG (ref 40–50)
PCO2 BLDV: 46 MM HG (ref 40–50)
PCO2 BLDV: 48 MM HG (ref 40–50)
PH BLD: 7.35 PH (ref 7.35–7.45)
PH BLDV: 7.3 PH (ref 7.32–7.43)
PH BLDV: 7.31 PH (ref 7.32–7.43)
PH BLDV: 7.39 PH (ref 7.32–7.43)
PHOSPHATE SERPL-MCNC: 3.3 MG/DL (ref 2.5–4.5)
PHOSPHATE SERPL-MCNC: 4.1 MG/DL (ref 2.5–4.5)
PLATELET # BLD AUTO: 146 10E9/L (ref 150–450)
PLATELET # BLD AUTO: 152 10E9/L (ref 150–450)
PO2 BLD: 168 MM HG (ref 80–105)
PO2 BLDV: 43 MM HG (ref 25–47)
PO2 BLDV: 45 MM HG (ref 25–47)
PO2 BLDV: 46 MM HG (ref 25–47)
POTASSIUM BLD-SCNC: 3.3 MMOL/L (ref 3.4–5.3)
POTASSIUM SERPL-SCNC: 3.4 MMOL/L (ref 3.4–5.3)
POTASSIUM SERPL-SCNC: 3.5 MMOL/L (ref 3.4–5.3)
RBC # BLD AUTO: 4.36 10E12/L (ref 4.4–5.9)
RBC # BLD AUTO: 4.42 10E12/L (ref 4.4–5.9)
SODIUM BLD-SCNC: 137 MMOL/L (ref 133–144)
SODIUM SERPL-SCNC: 135 MMOL/L (ref 133–144)
SODIUM SERPL-SCNC: 139 MMOL/L (ref 133–144)
SPECIMEN EXP DATE BLD: NORMAL
TRANSFUSION STATUS PATIENT QL: NORMAL
WBC # BLD AUTO: 4.4 10E9/L (ref 4–11)
WBC # BLD AUTO: 6.8 10E9/L (ref 4–11)

## 2019-02-19 PROCEDURE — 85027 COMPLETE CBC AUTOMATED: CPT | Performed by: STUDENT IN AN ORGANIZED HEALTH CARE EDUCATION/TRAINING PROGRAM

## 2019-02-19 PROCEDURE — 00000146 ZZHCL STATISTIC GLUCOSE BY METER IP

## 2019-02-19 PROCEDURE — 84295 ASSAY OF SERUM SODIUM: CPT | Performed by: ANESTHESIOLOGY

## 2019-02-19 PROCEDURE — 36000072 ZZH SURGERY LEVEL 5 W FLUORO 1ST 30 MIN - UMMC: Performed by: THORACIC SURGERY (CARDIOTHORACIC VASCULAR SURGERY)

## 2019-02-19 PROCEDURE — 84100 ASSAY OF PHOSPHORUS: CPT | Performed by: STUDENT IN AN ORGANIZED HEALTH CARE EDUCATION/TRAINING PROGRAM

## 2019-02-19 PROCEDURE — 40000048 ZZH STATISTIC DAILY SWAN MONITORING

## 2019-02-19 PROCEDURE — 27210794 ZZH OR GENERAL SUPPLY STERILE: Performed by: THORACIC SURGERY (CARDIOTHORACIC VASCULAR SURGERY)

## 2019-02-19 PROCEDURE — 85347 COAGULATION TIME ACTIVATED: CPT

## 2019-02-19 PROCEDURE — 25000128 H RX IP 250 OP 636: Performed by: STUDENT IN AN ORGANIZED HEALTH CARE EDUCATION/TRAINING PROGRAM

## 2019-02-19 PROCEDURE — 82805 BLOOD GASES W/O2 SATURATION: CPT | Performed by: STUDENT IN AN ORGANIZED HEALTH CARE EDUCATION/TRAINING PROGRAM

## 2019-02-19 PROCEDURE — 82805 BLOOD GASES W/O2 SATURATION: CPT | Performed by: INTERNAL MEDICINE

## 2019-02-19 PROCEDURE — 82947 ASSAY GLUCOSE BLOOD QUANT: CPT | Performed by: ANESTHESIOLOGY

## 2019-02-19 PROCEDURE — 37000009 ZZH ANESTHESIA TECHNICAL FEE, EACH ADDTL 15 MIN: Performed by: THORACIC SURGERY (CARDIOTHORACIC VASCULAR SURGERY)

## 2019-02-19 PROCEDURE — 27810169 ZZH OR IMPLANT GENERAL: Performed by: THORACIC SURGERY (CARDIOTHORACIC VASCULAR SURGERY)

## 2019-02-19 PROCEDURE — 40000275 ZZH STATISTIC RCP TIME EA 10 MIN

## 2019-02-19 PROCEDURE — 25800030 ZZH RX IP 258 OP 636: Performed by: NURSE ANESTHETIST, CERTIFIED REGISTERED

## 2019-02-19 PROCEDURE — 25000132 ZZH RX MED GY IP 250 OP 250 PS 637: Performed by: STUDENT IN AN ORGANIZED HEALTH CARE EDUCATION/TRAINING PROGRAM

## 2019-02-19 PROCEDURE — 25000125 ZZHC RX 250: Performed by: NURSE ANESTHETIST, CERTIFIED REGISTERED

## 2019-02-19 PROCEDURE — 25000128 H RX IP 250 OP 636: Performed by: PHYSICIAN ASSISTANT

## 2019-02-19 PROCEDURE — 40000014 ZZH STATISTIC ARTERIAL MONITORING DAILY

## 2019-02-19 PROCEDURE — 36000070 ZZH SURGERY LEVEL 5 EA 15 ADDTL MIN - UMMC: Performed by: THORACIC SURGERY (CARDIOTHORACIC VASCULAR SURGERY)

## 2019-02-19 PROCEDURE — 82803 BLOOD GASES ANY COMBINATION: CPT | Performed by: ANESTHESIOLOGY

## 2019-02-19 PROCEDURE — 71045 X-RAY EXAM CHEST 1 VIEW: CPT

## 2019-02-19 PROCEDURE — 80048 BASIC METABOLIC PNL TOTAL CA: CPT | Performed by: STUDENT IN AN ORGANIZED HEALTH CARE EDUCATION/TRAINING PROGRAM

## 2019-02-19 PROCEDURE — 85520 HEPARIN ASSAY: CPT | Performed by: STUDENT IN AN ORGANIZED HEALTH CARE EDUCATION/TRAINING PROGRAM

## 2019-02-19 PROCEDURE — P9016 RBC LEUKOCYTES REDUCED: HCPCS | Performed by: PHYSICIAN ASSISTANT

## 2019-02-19 PROCEDURE — 40000277 XR SURGERY CARM FLUORO LESS THAN 5 MIN W STILLS: Mod: TC

## 2019-02-19 PROCEDURE — 84132 ASSAY OF SERUM POTASSIUM: CPT | Performed by: ANESTHESIOLOGY

## 2019-02-19 PROCEDURE — C1769 GUIDE WIRE: HCPCS | Performed by: THORACIC SURGERY (CARDIOTHORACIC VASCULAR SURGERY)

## 2019-02-19 PROCEDURE — 37000008 ZZH ANESTHESIA TECHNICAL FEE, 1ST 30 MIN: Performed by: THORACIC SURGERY (CARDIOTHORACIC VASCULAR SURGERY)

## 2019-02-19 PROCEDURE — 40000196 ZZH STATISTIC RAPCV CVP MONITORING

## 2019-02-19 PROCEDURE — 82330 ASSAY OF CALCIUM: CPT | Performed by: ANESTHESIOLOGY

## 2019-02-19 PROCEDURE — 25000132 ZZH RX MED GY IP 250 OP 250 PS 637: Performed by: INTERNAL MEDICINE

## 2019-02-19 PROCEDURE — 83735 ASSAY OF MAGNESIUM: CPT | Performed by: STUDENT IN AN ORGANIZED HEALTH CARE EDUCATION/TRAINING PROGRAM

## 2019-02-19 PROCEDURE — 03U40JZ SUPPLEMENT LEFT SUBCLAVIAN ARTERY WITH SYNTHETIC SUBSTITUTE, OPEN APPROACH: ICD-10-PCS | Performed by: THORACIC SURGERY (CARDIOTHORACIC VASCULAR SURGERY)

## 2019-02-19 PROCEDURE — 40000081 ZZH STATISTIC INSERT IABP

## 2019-02-19 PROCEDURE — 25000565 ZZH ISOFLURANE, EA 15 MIN: Performed by: THORACIC SURGERY (CARDIOTHORACIC VASCULAR SURGERY)

## 2019-02-19 PROCEDURE — 20000004 ZZH R&B ICU UMMC

## 2019-02-19 PROCEDURE — 25000128 H RX IP 250 OP 636: Performed by: NURSE ANESTHETIST, CERTIFIED REGISTERED

## 2019-02-19 PROCEDURE — 40000076 ZZH STATISTIC IABP MONITORING

## 2019-02-19 PROCEDURE — 40000986 XR CHEST PORT 1 VW

## 2019-02-19 PROCEDURE — 27210305 ZZH CATH BALLOON IABP

## 2019-02-19 PROCEDURE — 99291 CRITICAL CARE FIRST HOUR: CPT | Mod: 25 | Performed by: INTERNAL MEDICINE

## 2019-02-19 DEVICE — GRAFT VASCUTEK GELWEAVE STRAIGHT 8MMX15CM 731508: Type: IMPLANTABLE DEVICE | Site: SUBCLAVIAN | Status: FUNCTIONAL

## 2019-02-19 RX ORDER — LIDOCAINE HYDROCHLORIDE 20 MG/ML
INJECTION, SOLUTION INFILTRATION; PERINEURAL PRN
Status: DISCONTINUED | OUTPATIENT
Start: 2019-02-19 | End: 2019-02-19

## 2019-02-19 RX ORDER — NOREPINEPHRINE BITARTRATE/D5W 16MG/250ML
0.03-0.4 PLASTIC BAG, INJECTION (ML) INTRAVENOUS CONTINUOUS
Status: DISCONTINUED | OUTPATIENT
Start: 2019-02-19 | End: 2019-02-22

## 2019-02-19 RX ORDER — POTASSIUM CHLORIDE 750 MG/1
20-40 TABLET, EXTENDED RELEASE ORAL
Status: DISCONTINUED | OUTPATIENT
Start: 2019-02-19 | End: 2019-03-02

## 2019-02-19 RX ORDER — ETOMIDATE 2 MG/ML
INJECTION INTRAVENOUS PRN
Status: DISCONTINUED | OUTPATIENT
Start: 2019-02-19 | End: 2019-02-19

## 2019-02-19 RX ORDER — FENTANYL CITRATE 50 UG/ML
INJECTION, SOLUTION INTRAMUSCULAR; INTRAVENOUS PRN
Status: DISCONTINUED | OUTPATIENT
Start: 2019-02-19 | End: 2019-02-19

## 2019-02-19 RX ORDER — POTASSIUM CHLORIDE 7.45 MG/ML
10 INJECTION INTRAVENOUS
Status: DISCONTINUED | OUTPATIENT
Start: 2019-02-19 | End: 2019-03-02

## 2019-02-19 RX ORDER — POTASSIUM CL/LIDO/0.9 % NACL 10MEQ/0.1L
10 INTRAVENOUS SOLUTION, PIGGYBACK (ML) INTRAVENOUS
Status: DISCONTINUED | OUTPATIENT
Start: 2019-02-19 | End: 2019-03-02

## 2019-02-19 RX ORDER — POTASSIUM CHLORIDE 29.8 MG/ML
20 INJECTION INTRAVENOUS
Status: DISCONTINUED | OUTPATIENT
Start: 2019-02-19 | End: 2019-03-02

## 2019-02-19 RX ORDER — ONDANSETRON 2 MG/ML
INJECTION INTRAMUSCULAR; INTRAVENOUS PRN
Status: DISCONTINUED | OUTPATIENT
Start: 2019-02-19 | End: 2019-02-19

## 2019-02-19 RX ORDER — NALOXONE HYDROCHLORIDE 0.4 MG/ML
.1-.4 INJECTION, SOLUTION INTRAMUSCULAR; INTRAVENOUS; SUBCUTANEOUS
Status: DISCONTINUED | OUTPATIENT
Start: 2019-02-19 | End: 2019-02-24

## 2019-02-19 RX ORDER — PROTAMINE SULFATE 10 MG/ML
INJECTION, SOLUTION INTRAVENOUS PRN
Status: DISCONTINUED | OUTPATIENT
Start: 2019-02-19 | End: 2019-02-19

## 2019-02-19 RX ORDER — HEPARIN SODIUM 1000 [USP'U]/ML
INJECTION, SOLUTION INTRAVENOUS; SUBCUTANEOUS PRN
Status: DISCONTINUED | OUTPATIENT
Start: 2019-02-19 | End: 2019-02-19

## 2019-02-19 RX ORDER — FENTANYL CITRATE 50 UG/ML
25 INJECTION, SOLUTION INTRAMUSCULAR; INTRAVENOUS ONCE
Status: COMPLETED | OUTPATIENT
Start: 2019-02-19 | End: 2019-02-19

## 2019-02-19 RX ORDER — CEFAZOLIN SODIUM 1 G/3ML
1 INJECTION, POWDER, FOR SOLUTION INTRAMUSCULAR; INTRAVENOUS EVERY 8 HOURS
Status: COMPLETED | OUTPATIENT
Start: 2019-02-19 | End: 2019-02-20

## 2019-02-19 RX ORDER — DEXAMETHASONE SODIUM PHOSPHATE 4 MG/ML
INJECTION, SOLUTION INTRA-ARTICULAR; INTRALESIONAL; INTRAMUSCULAR; INTRAVENOUS; SOFT TISSUE PRN
Status: DISCONTINUED | OUTPATIENT
Start: 2019-02-19 | End: 2019-02-19

## 2019-02-19 RX ORDER — POTASSIUM CHLORIDE 1.5 G/1.58G
20-40 POWDER, FOR SOLUTION ORAL
Status: DISCONTINUED | OUTPATIENT
Start: 2019-02-19 | End: 2019-03-02

## 2019-02-19 RX ORDER — OXYCODONE HYDROCHLORIDE 5 MG/1
5 TABLET ORAL EVERY 6 HOURS PRN
Status: DISCONTINUED | OUTPATIENT
Start: 2019-02-19 | End: 2019-02-26

## 2019-02-19 RX ORDER — HEPARIN SODIUM 10000 [USP'U]/100ML
0-3500 INJECTION, SOLUTION INTRAVENOUS CONTINUOUS
Status: DISCONTINUED | OUTPATIENT
Start: 2019-02-19 | End: 2019-02-20

## 2019-02-19 RX ORDER — CEFAZOLIN SODIUM 2 G/100ML
INJECTION, SOLUTION INTRAVENOUS PRN
Status: DISCONTINUED | OUTPATIENT
Start: 2019-02-19 | End: 2019-02-19

## 2019-02-19 RX ORDER — SODIUM CHLORIDE 9 MG/ML
INJECTION, SOLUTION INTRAVENOUS CONTINUOUS PRN
Status: DISCONTINUED | OUTPATIENT
Start: 2019-02-19 | End: 2019-02-19

## 2019-02-19 RX ADMIN — TAMSULOSIN HYDROCHLORIDE 0.4 MG: 0.4 CAPSULE ORAL at 18:49

## 2019-02-19 RX ADMIN — HEPARIN SODIUM 700 UNITS/HR: 10000 INJECTION, SOLUTION INTRAVENOUS at 21:02

## 2019-02-19 RX ADMIN — ACETAMINOPHEN 650 MG: 325 TABLET, FILM COATED ORAL at 20:19

## 2019-02-19 RX ADMIN — AMIODARONE HYDROCHLORIDE 200 MG: 200 TABLET ORAL at 08:09

## 2019-02-19 RX ADMIN — OXYCODONE HYDROCHLORIDE 5 MG: 5 TABLET ORAL at 20:19

## 2019-02-19 RX ADMIN — MONTELUKAST SODIUM 10 MG: 10 TABLET, COATED ORAL at 21:21

## 2019-02-19 RX ADMIN — MESALAMINE 2400 MG: 800 TABLET, DELAYED RELEASE ORAL at 08:12

## 2019-02-19 RX ADMIN — Medication 12.5 MG: at 08:12

## 2019-02-19 RX ADMIN — POTASSIUM CHLORIDE 20 MEQ: 400 INJECTION, SOLUTION INTRAVENOUS at 18:47

## 2019-02-19 RX ADMIN — MAGNESIUM SULFATE HEPTAHYDRATE 2 G: 40 INJECTION, SOLUTION INTRAVENOUS at 20:04

## 2019-02-19 RX ADMIN — CEFAZOLIN SODIUM 2 G: 2 INJECTION, SOLUTION INTRAVENOUS at 12:48

## 2019-02-19 RX ADMIN — OXYCODONE HYDROCHLORIDE 5 MG: 5 TABLET ORAL at 01:12

## 2019-02-19 RX ADMIN — MILRINONE LACTATE 0.25 MCG/KG/MIN: 200 INJECTION, SOLUTION INTRAVENOUS at 12:30

## 2019-02-19 RX ADMIN — FENTANYL CITRATE 50 MCG: 50 INJECTION, SOLUTION INTRAMUSCULAR; INTRAVENOUS at 13:05

## 2019-02-19 RX ADMIN — FENTANYL CITRATE 50 MCG: 50 INJECTION, SOLUTION INTRAMUSCULAR; INTRAVENOUS at 13:03

## 2019-02-19 RX ADMIN — POTASSIUM CHLORIDE 20 MEQ: 400 INJECTION, SOLUTION INTRAVENOUS at 13:55

## 2019-02-19 RX ADMIN — NOREPINEPHRINE BITARTRATE 6.4 MCG: 1 INJECTION INTRAVENOUS at 12:06

## 2019-02-19 RX ADMIN — FLUTICASONE FUROATE 1 PUFF: 200 POWDER RESPIRATORY (INHALATION) at 08:12

## 2019-02-19 RX ADMIN — FENTANYL CITRATE 25 MCG: 50 INJECTION, SOLUTION INTRAMUSCULAR; INTRAVENOUS at 01:25

## 2019-02-19 RX ADMIN — BENZOCAINE, MENTHOL 1 LOZENGE: 15; 3.6 LOZENGE ORAL at 19:00

## 2019-02-19 RX ADMIN — ONDANSETRON 4 MG: 2 INJECTION INTRAMUSCULAR; INTRAVENOUS at 14:25

## 2019-02-19 RX ADMIN — ROCURONIUM BROMIDE 30 MG: 10 INJECTION INTRAVENOUS at 12:09

## 2019-02-19 RX ADMIN — ROCURONIUM BROMIDE 20 MG: 10 INJECTION INTRAVENOUS at 13:03

## 2019-02-19 RX ADMIN — OXYCODONE HYDROCHLORIDE 5 MG: 5 TABLET ORAL at 08:09

## 2019-02-19 RX ADMIN — LIDOCAINE HYDROCHLORIDE 80 MG: 20 INJECTION, SOLUTION INFILTRATION; PERINEURAL at 12:04

## 2019-02-19 RX ADMIN — MILRINONE LACTATE: 200 INJECTION, SOLUTION INTRAVENOUS at 12:45

## 2019-02-19 RX ADMIN — MIDAZOLAM 2 MG: 1 INJECTION INTRAMUSCULAR; INTRAVENOUS at 11:44

## 2019-02-19 RX ADMIN — Medication 1 MG: at 20:19

## 2019-02-19 RX ADMIN — HEPARIN SODIUM 10000 UNITS: 1000 INJECTION, SOLUTION INTRAVENOUS; SUBCUTANEOUS at 13:22

## 2019-02-19 RX ADMIN — NOREPINEPHRINE BITARTRATE 6.4 MCG: 1 INJECTION INTRAVENOUS at 13:30

## 2019-02-19 RX ADMIN — OMEPRAZOLE 40 MG: 20 CAPSULE, DELAYED RELEASE ORAL at 19:50

## 2019-02-19 RX ADMIN — CEFAZOLIN 1 G: 1 INJECTION, POWDER, FOR SOLUTION INTRAMUSCULAR; INTRAVENOUS at 17:36

## 2019-02-19 RX ADMIN — FENTANYL CITRATE 75 MCG: 50 INJECTION, SOLUTION INTRAMUSCULAR; INTRAVENOUS at 13:06

## 2019-02-19 RX ADMIN — DULOXETINE 20 MG: 20 CAPSULE, DELAYED RELEASE ORAL at 08:09

## 2019-02-19 RX ADMIN — ROCURONIUM BROMIDE 50 MG: 10 INJECTION INTRAVENOUS at 12:04

## 2019-02-19 RX ADMIN — SUGAMMADEX 200 MG: 100 INJECTION, SOLUTION INTRAVENOUS at 14:58

## 2019-02-19 RX ADMIN — FENTANYL CITRATE 75 MCG: 50 INJECTION, SOLUTION INTRAMUSCULAR; INTRAVENOUS at 12:04

## 2019-02-19 RX ADMIN — MILRINONE LACTATE 0.25 MCG/KG/MIN: 200 INJECTION, SOLUTION INTRAVENOUS at 11:20

## 2019-02-19 RX ADMIN — LEVOTHYROXINE SODIUM 100 MCG: 100 TABLET ORAL at 08:09

## 2019-02-19 RX ADMIN — DEXAMETHASONE SODIUM PHOSPHATE 8 MG: 4 INJECTION, SOLUTION INTRA-ARTICULAR; INTRALESIONAL; INTRAMUSCULAR; INTRAVENOUS; SOFT TISSUE at 12:42

## 2019-02-19 RX ADMIN — POTASSIUM CHLORIDE 20 MEQ: 400 INJECTION, SOLUTION INTRAVENOUS at 10:18

## 2019-02-19 RX ADMIN — SODIUM CHLORIDE: 9 INJECTION, SOLUTION INTRAVENOUS at 11:45

## 2019-02-19 RX ADMIN — MESALAMINE 2400 MG: 800 TABLET, DELAYED RELEASE ORAL at 19:50

## 2019-02-19 RX ADMIN — PROTAMINE SULFATE 25 MG: 10 INJECTION, SOLUTION INTRAVENOUS at 14:22

## 2019-02-19 RX ADMIN — ETOMIDATE 20 MG: 2 INJECTION INTRAVENOUS at 12:04

## 2019-02-19 ASSESSMENT — ACTIVITIES OF DAILY LIVING (ADL)
ADLS_ACUITY_SCORE: 11

## 2019-02-19 ASSESSMENT — PAIN DESCRIPTION - DESCRIPTORS
DESCRIPTORS: ACHING
DESCRIPTORS: ACHING
DESCRIPTORS: DISCOMFORT
DESCRIPTORS: ACHING

## 2019-02-19 ASSESSMENT — MIFFLIN-ST. JEOR: SCORE: 1402.8

## 2019-02-19 NOTE — PLAN OF CARE
Adult Inpatient Plan of Care  Plan of Care Review  VSS throughout the shift.  Right groin pain after IABP placement.  Oxycodone ordered with a one time dose of Fentanyl with good relief of pain.  Blood sugar 75 at 0600 with no symptoms.  Pt. Tried to void this AM and was unable to.  Bladder scan result at 0600 392ML.  No complaints of bladder pressure at this time.  I will inform next nurse coming on.

## 2019-02-19 NOTE — TELEPHONE ENCOUNTER
Outpatient Referral-Heart Transplant    Referral was made by Dr. Donis for Heart Transplant. Patient's chart reviewed and plan made for patient to be brought in prior to the VAD for repeat hemodynamics and possible transplant listing.      VAD Info:  Implant Date:  Scheduled implant 2-19-19, possible delay if listed for transplant prior to implant  VAD Coordinator:  Krishna (managing eval)  MD:  Gagandeep    Transplant Eval:  Yes-testing ordered for admission  Lead for Eval:  Meghann  Orders Placed:  2-14-19    Diagnosis: NICM   ABO: O   BMI:  21                   Heart Failure Symptoms:  No new symptoms reported, scheduled for VAD next week      Colonoscopy: UTD-done with VAD eval,   Mammogram/OBGYN: N/A  Dentist: 12-12-18, cleared with IP service   Vaccinations: HOLD as patient may be listed for tx in the next week  Dexa:  Scheduled 2-15-19  Transplant Teaching:  Will do as inpatient with parents present  Previous Chest Surgery: No         Smoking Status: Denies    Nicotine Products: Denies                  ETOH/Recreation Drug Use:  Has had NEG screening for ETOH        Social History:  Was possible concerns for ETOH abuse, now cleared for tx    Financial concerns: Not discussed      Plan:  Called patient to discuss possible listing for transplant if meets criteria upon admission to the ICU for VAD.  Made a plan to discuss further with Dr. Donis and discuss with his parents (primary caregivers for patient).  Discussed plan with Dr. Donis and Krishna, VAD coordinator.

## 2019-02-19 NOTE — COMMITTEE REVIEW
Thoracic Committee Review Note     Committee Review Date: 2/1/2019    Organ being evaluated for: Heart    Transplant Phase: Referral  Transplant Status: Active    Transplant Coordinator: Meghann Terry  Transplant MD:  Corinna Donis    Primary Diagnosis: Congenital Heart Defect - With Surgery  Secondary Diagnosis:     Committee Review Members:  Cardiology Yanni Lorenzana, RN, MICHAEL GUTIERREZ, RN, Silvestre Oleary, RN, Nazia Crouch, RN   Dietitian, Registered Belinda Mandujano, EDD   Nurse Practitioner - Adult Health Nazia Srivasatva, APRN CNP   Pharmacist Saud Donis, [facility]   Transplant Dewayne House MD, Corinna Donis MD, Trev Harris MD, Bettina Giron PsyD, Rose Blunt, RADHA, Renato Hernandez, RADHA, Vidya Payne, RADHA, Ben White MD, Nadja Galan MD, MD Lucinda Cao, PFR    Transplant Eligibility: Disabling heart disease on maximal medical therapy    Committee Review Decision: Approved    Relative Contraindications: None    Absolute Contraindications:  None    Committee Chair Corinna Donis MD verbally attested to the committee's decision.    Committee Discussion Details: A-fib, hx ASD repair at age 3. 54 yo è IDC, hx. ETOH abuse -possible etiology of CM.   Initially introduced to Team in the fall.  Father is on dialysis, mother is in her 80s, would be pt s primary caregiver.  Pt told to be abstinent from ETOH, has been followed in clinic closely to follow compliance with meds, etc.  Comes to all his appts, PeTH tests negative, parents came to clinic with pt o Wed. when we had below zero temps.  Parents 100% committed, and they met with Krishna Oleary  for VAD show and tell  SW - no concerns per Airam s report.  Discussion - Pt is prepared for VAD and has met criteria.  How long would he need to be abstinent before he would be eligible for Transplant ?   Needs 3 months, which he s done. Also has PAH, thyroid nodule.  Should we do VAD and let him  recover, then finish transplant eval?  5  8   65 kg.  Thin, though not malnourished.  ABO=O.  Would we bring him in for IABP and try to get him transplanted?  Though not sure if he meets Status 2 criteria.  Has been on inotropes.  Needs repeat RHC.   Will discuss options with pt to see what his preference is.  Will tentatively schedule an OR date for him next week.  Will need a Edwards and a balloon pre-VAD, and if he meets Status 2 criteria then, could postpone the VAD for a couple of weeks.

## 2019-02-19 NOTE — PROCEDURES
PRELIMINARY CARDIAC CATH REPORT:     PROCEDURES PERFORMED:   IABP    PHYSICIANS:  Attending Physician: Alton Solomon MD  Interventional Cardiology Fellow: None  Cardiology Fellow: Luis Carlos Templeton MD    INDICATION:  Everton Larios is a 53 year old male with hx ASD s/p closure, pAF and NICM with NYHA class III/V admitted for cardiogenic shock, who presents on an elective basis. The clinical presentation was LV Dysfunction.    The indication for the procedure was pre-heart transplant optimization with IABP.    DESCRIPTION:  1. Consent obtained with discussion of risks.  All questions were answered.  2. Sterile prep and procedure.  3. Location with Sheaths:   Rt Femoral Arterial  9 Fr 10 cm [short]  4. Access: Local anesthetic with lidocaine.  A standard 18 guage needle with ultrasound guidance was used to establish vascular access using a modified Seldinger technique.  5. Diagnostic Catheters:   None  6. Guiding Catheters:  None  6. Estimated blood loss: < 5 ml    MEDICATIONS:  The procedure was performed under conscious sedation for 12 minutes from 1805 to 1817.  The patient was assessed immediately before the first sedation medication was administered.  Midazolam 2 mg and Fentanyl 100 mcg were administered.  Heart rate, BP, respiration, oxygen saturation and patient responses were monitored throughout the procedure with the assistance of the RN under my supervision.    Procedures:    INTRA-AORTIC BALLOON PUMP INSERTION:  1. A 7.5 Fr 50 mL IABP was inserted into the right femoral artery under fluoroscopic guidance.          Sheath Removal:  The RFA sheath will be removed after the patient has been transferred to the unit.    Contrast: Isovue, 0 ml     Fluoroscopy Time: 0.6 min    COMPLICATIONS:  1. High arteriotomy stick     SUMMARY:   - Successful implantation of IABP via RFA.   - Opening aortic pressure was 87/60/66 mmHg (prior to IABP implantation).  - High arteriotomy stick     PLAN:   >> Bedrest per  protocol.  >>. Return to the primary inpatient team for further evaluation and management.    The attending interventional cardiologist was present and supervised all critical aspects the procedure.    Findings discussed with Dr. Rockwell (cards 2).    See CVIS report for final draft.    Luis Carlos Templeton MD   Cardiology Fellow    Adalberto Solomon MD/PhD   Cardiology Staff

## 2019-02-19 NOTE — PLAN OF CARE
D/I/A:  Neuro: A&O x4, up independently/standby for line management. Calls appropriately.  CV: HR V paced 80s. BP stable, Systolics >90. Milrinone increased. CI 1.6, CO 2.7, SVR 2370, SVO2 52. CVP 6. DC'd lasix gtt. R femoral balloon pump placed this evening.   Pulm: Room Air  GI: 2g Na diet, NPO at midnight for possible LVAD/or subclavian balloon pump placement. Complaints of constipation, no BM this shift.  : Voiding w/ urinal. Good response to lasix bolus and gtt. Lasix gtt currently discontinued. Lytes replaced.  Gtt: Heparin, Milrinone.  P: Plan for either LVAD or Subclavian balloon pump placement tomorrow.

## 2019-02-19 NOTE — PROGRESS NOTES
Addendum to problem list:    # New pulmonary nodules  Multiple scattered <4 mm calcified and noncalcified nodules found incidentally on CT chest on 2/18, apparently was not identified on prior CT imaging. He does have a 25 pack year smoking history, quit in 2008. Given the intermediate risk for malignancy from significant smoking history, will need repeat chest CT in 1 year per Fleischner criteria to follow up nodules for any growth or transformation.  - will need repeat chest CT in 1 year to f/u pulmonary nodules    Saud Jaeger MD  Internal Medicine PGY-2  Pager [phone]    CT chest w/o contrast (2/18/19):  FINDINGS:     Lines and tubes: Right chest wall implantable cardiac defibrillator  and pacemaker generator and leads are in stable position.     Mediastinum: No thyroid nodules. Central tracheobronchial tree is  patent. Heart size is enlarged. No pericardial effusion.  Normal  thoracic vasculature. Small amount of thoracic lymphadenopathy.      Lungs: No consolidation. No pleural effusion or pneumothorax. Mild  bronchial wall thickening. Mild atelectasis in the left lung base.  Scattered sub-4 mm calcified and noncalcified pulmonary nodules in the  lung. Largest measures 3 mm in the central right lower lobe (series 6,  image 229).     Bones and soft tissues: No suspicious bone findings.      Upper abdomen: Limited. The liver is dense.                                                                      IMPRESSION:   1. Severe cardiomegaly. Right chest wall implantable cardiac  defibrillator and pacemaker generator and intravenous leads appear in  the appropriate position.  2. Increased liver density consistent with amiodarone therapy.  3. Scattered sub-4 mm calcified and noncalcified pulmonary nodules.  Follow-up per Fleischner criteria.     [Consider Follow Up: Lung nodule]      Critical Care ICU Note - Cardiology  Rosendo Beck M.D.    Impression:  Recent surgery  Cardiogenic shock  Acute and  chronic congestive heart failure  Acute respiratory failure  Acute renal failure  I personally reviewed:    Hemodynamic parameters obtained by central hemodynamic monitoring including RAP, estimated LVEDP, pulmonary artery pressure, cardiac output and vascular resistances in order to adjust fluids and infused medications for blood pressure and cardiac output maintenance.  tension    Volume status, renal function and nutritional support as judged by intake, output, daily weight and appropriate laboratories.    I reviewed individual and serial imaging studies including echocardiogram, chest x-ray, CT scan    I personally supervised the prescription of parenteral fluids, inotropes, vasodilators , and vasopressors in order to correct cardiac output, maintain urine output and renal function.    30 minutes ICU

## 2019-02-19 NOTE — ANESTHESIA PREPROCEDURE EVALUATION
Anesthesia Pre-Procedure Evaluation    Patient: Everton Larios   MRN:     [id number] Gender:   male   Age:    53 year old :      1965        Preoperative Diagnosis: Heart Failure   Procedure(s):  Subclavian Balloon Pump     Past Medical History:   Diagnosis Date     Alcohol abuse      Pierce's esophagus 10/4/2018     Chronic rhinitis 10/4/2018     Chronic systolic heart failure (H) 10/4/2018     Depression 10/4/2018     Diastolic dysfunction      DJD (degenerative joint disease) - neck 10/4/2018     H/O congenital atrial septal defect (ASD) repair at age 3 10/4/2018     Hypothyroidism due to medication 10/4/2018     ICD, [place] ; gen change 2018 10/4/2018     Intermittent asthma without complication 10/4/2018     Nonischemic cardiomyopathy (H) 10/4/2018     On amiodarone therapy 10/4/2018     Paroxysmal atrial fibrillation (H) 10/4/2018     S/P ablation of atrial fibrillation 10/4/2018     Systolic heart failure (H)      Ulcerative colitis (H) 10/4/2018     Ventricular tachycardia (H) 10/4/2018      Past Surgical History:   Procedure Laterality Date     AICD, DUAL CHAMBER                 [facility] AN PHYSICAL EXAM    Lab Results   Component Value Date    WBC 4.4 2019    HGB 14.1 2019    HCT 40.7 2019     2019    CRP 6.1 11/15/2018     2019    POTASSIUM 3.3 (L) 2019    CHLORIDE 101 2019    CO2 26 2019    BUN 13 2019    CR 0.96 2019    GLC 80 2019    RADAMES 8.5 2019    PHOS 4.1 2019    MAG 2.5 (H) 2019    ALBUMIN 3.5 2019    PROTTOTAL 6.8 2019    ALT 20 2019    AST 19 2019    ALKPHOS 146 2019    BILITOTAL 2.0 (H) 2019    PTT 36 2019    INR 1.47 (H) 2019    TSH 10.41 (H) 2019    T4 1.32 2019       Preop Vitals  BP Readings from Last 3 Encounters:   02/19/19 (!) 65/60   19 99/74   19 100/66    Pulse Readings from Last 3  "Encounters:   02/19/19 63   01/30/19 77   01/14/19 85      Resp Readings from Last 3 Encounters:   02/19/19 16   11/28/18 18   11/23/18 16    SpO2 Readings from Last 3 Encounters:   02/19/19 95%   01/30/19 98%   01/14/19 94%      Temp Readings from Last 1 Encounters:   02/19/19 36.4  C (97.6  F) (Oral)    Ht Readings from Last 1 Encounters:   02/17/19 1.727 m (5' 8\")      Wt Readings from Last 1 Encounters:   02/19/19 59.3 kg (130 lb 12.8 oz)    Estimated body mass index is 19.89 kg/m  as calculated from the following:    Height as of this encounter: 1.727 m (5' 8\").    Weight as of this encounter: 59.3 kg (130 lb 12.8 oz).     LDA:  PICC Single Lumen 11/19/18 Right Basilic (Active)   Site Assessment [facility] 2/19/2019  8:00 AM   Line Status Infusing 2/19/2019  8:00 AM   Extravasation? No 2/19/2019  8:00 AM   Dressing Intervention Transparent 2/19/2019  8:00 AM   Dressing Change Due 02/24/19 2/18/2019  9:00 PM   PICC Comment missing securement stitch- dr. davis notified 2/18/2019 12:00 PM   Number of days: 92       CVC Double Lumen 02/17/19 Right Internal jugular (Active)   Site Assessment [facility] 2/19/2019  8:00 AM   Dressing Intervention Transparent 2/19/2019  8:00 AM   Dressing Change Due 02/24/19 2/18/2019  9:00 PM   CVC Comment swan 2/19/2019  8:00 AM   Lumen A - Color BROWN 2/19/2019  5:00 AM   Lumen A - Status saline locked 2/19/2019  5:00 AM   Lumen A - Cap Change Due 02/22/19 2/19/2019  5:00 AM   Lumen B - Color WHITE 2/19/2019  5:00 AM   Lumen B - Status infusing 2/19/2019  5:00 AM   Lumen B - Cap Change Due 02/22/19 2/19/2019  5:00 AM   Extravasation? No 2/19/2019  5:00 AM   Number of days: 2       Arterial Sheath  (Active)   Specific Qualities Stat Locked 2/19/2019  8:00 AM   Site Assessment WDL 2/19/2019  8:00 AM   Dressing Type Transparent 2/19/2019  8:00 AM   Arterial Sheath Comment IABP 2/19/2019  8:00 AM   Number of days: 1       Arterial Sheath  (Active)   Number of days: 0       Pulmonary Artery " Catheter - Quad Lumen 02/17/19 1500 Right internal jugular vein monitoring catheter (Active)   Dressing dressing dry and intact 2/19/2019  8:00 AM   Securement catheter securement device utilized 2/19/2019  8:00 AM   PA Catheter Markings (cm) 59 2/19/2019  8:00 AM   Phlebitis 0-->no symptoms 2/19/2019  8:00 AM   Infiltration 0-->no symptoms 2/19/2019  8:00 AM   Waveform normal 2/19/2019  1:00 AM   Lumen A - Color BLUE 2/19/2019  5:00 AM   Lumen A - Status infusing 2/19/2019  8:00 AM   Lumen A - Cap Change Due 02/22/19 2/19/2019  5:00 AM   Lumen B - Color WHITE 2/19/2019  5:00 AM   Lumen B - Status saline locked 2/19/2019  8:00 AM   Lumen B - Cap Change Due 02/20/19 2/19/2019  5:00 AM   Lumen C - Color OTHER 2/19/2019  8:00 AM   Lumen C - Status other (see comment) 2/19/2019  8:00 AM   Lumen C - Cap Change Due 02/22/19 2/19/2019  5:00 AM   Number of days: 2       ETT (adult) 8 (Active)   Number of days: 0       Urethral Catheter Latex 16 fr (Active)   Number of days: 0            Assessment:   ASA SCORE: 4    NPO Status: > 6 hours since completed Solid Foods   Documentation: H&P complete; Preop Testing complete; Consents complete   Proceeding: Proceed without further delay  Tobacco Use:  Active user of Tobacco     Plan:   Anes. Type:  General   Pre-Induction: Midazolam IV; Acetaminophen PO   Induction:  IV (Standard)   Airway: Oral ETT   Access/Monitoring: PIV   Maintenance: Balanced   Emergence: Procedure Site   Logistics: Same Day Surgery     Postop Pain/Sedation Strategy:  Standard-Options: Opioids PRN     PONV Management:  Adult Risk Factors:, Postop Opioids                         Magan Smith MD

## 2019-02-19 NOTE — PROGRESS NOTES
Cardiology Progress Note  Everton Larios MRN: [id number]  Age: 53 year old, : 1965  02/19/19            Changes Today:     - exchange RFA balloon pump for subclavian balloon pump this AM  - currently status 2 for heart transplant  - continue milrinone at 0.25 mcg/kg/min  - increased to high dose replacement for magnesium  - BMP BID  - continue to monitor Start numbers Q6H        Assessment and Plan:     Everton Larios is a 52 yo gentleman with a h/o NICM, HFrEF (EF 15%) s/p ICD placement (, generator change ), congenital ASD repair in childhood, paroxysmal atrial fibrillation s/p ablation and cardioversion, hypothyroidism, EtOH abuse, ulcerative colitis, remote history of GI bleed with splenic embolization, and a recent admission in 2018 for ambulatory cardiogenic shock requiring initiation of IV inotropes, who was admitted for LVAD placement vs heart transplantation.     # Chronic decompensated systolic heart failure  # Ambulatory cardiogenic shock, inotrope dependent  # NICM s/p ICD  # H/o congenital ASD repair  NYHA class: IV  AHA stage: D  Etiology: NICM     Patient notes that when fluid builds up, it is usually in his abdomen, but is currently asymptomatic. Has been short of breath with at least 2 blocks of ambulation. Currently undergoing process of LVAD vs heart transplant now that work up has been completed. Start numbers on admission showed CVP 11, CI 3.2, PCWP 24, and SVR 1600; PCWP slightly elevated from last RHC.    - exchange RFA balloon pump for subclavian balloon pump this AM  - currently status 2 for heart transplant  - continue milrinone at 0.25 mcg/kg/min  - continue spironolactone 12.5 mg daily  - continue to monitor Start numbers Q6H  - increased to high dose replacement for magnesium  - electrolyte protocol ordered, held PTA KCl     # Paroxysmal Afib s/p ablation  - continue low dose heparin gtt while awaiting transplant  - held PTA warfarin  -  continue PTA amiodarone 200 mg daily  - not on beta-blocker at this time     # Hypothyroidism: continue PTA levothyroxine  # Ulcerative colitis: currently in remission, continue PTA mesalamine  # GERD: continue PTA omeprazole  # BPH: continue PTA tamsulosin  # Depression: continue PTA duloxetine  # H/o EtOH abuse: currently denies any EtOH since 4 months ago    FEN: 2g Na, 2L fluid restriction, NPO at MN for IABP 2/18  Ppx: heparin gtt  LDA: PIV, R PICC, RIJ PA catheter, RFA balloon pump  Dispo: Pending heart transplant this admission, currently status 2     CODE: Full     Patient was seen and discussed with Dr. Beck, who agrees with the assessment and plan.    Saud Jaeger MD  Internal Medicine PGY-2  Pager [phone]          Subjective     RN notes reviewed, NAEO. He denies any SOB, chest pain, or abdominal pain. He feels uncomfortable with RFA balloon pump, but tolerating.          Objective     Vitals:  Temp:  [97.1  F (36.2  C)-98.5  F (36.9  C)] 97.6  F (36.4  C)  Pulse:  [60-82] 60  Heart Rate:  [60-78] 67  Resp:  [9-32] 20  BP: ()/(44-80) 104/70  SpO2:  [93 %-99 %] 98 %    Vitals:    02/17/19 1330 02/18/19 0500   Weight: 63.5 kg (140 lb 1.6 oz) 60.3 kg (133 lb)     [facility] (2/18): CVP 11, PA 42/22, PCWP 20 (was 17), Jaden CO 5.6, Jaden CI 3.3,  (was 2300), SvO2 76 (was 52)     General:  Alert, sitting up in bed, no acute distress.  HEENT: Atraumatic, anicteric sclera, extraocular motion intact, nares dry, mucus membranes moist.  CV: Normal rate, regular rhythm. S1, prominent S2.  No murmurs, rubs or gallops. JVD not seen.  Resp: Clear to auscultation bilaterally, no accessory muscle use.  Abdomen: Bowel sounds +, soft, nontender, no hepatosplenomegaly, no masses.  Extremities: No peripheral edema, warm and well perfused.  Skin: No rashes, bruising, or jaundice.  Neuro: Alert, oriented x 3, symmetric facial expressions, moving extremities equally  Psych: Pleasant affect.          Data:      Last Comprehensive Metabolic Panel:  Sodium   Date Value Ref Range Status   02/19/2019 135 133 - 144 mmol/L Final     Potassium   Date Value Ref Range Status   02/19/2019 3.5 3.4 - 5.3 mmol/L Final     Chloride   Date Value Ref Range Status   02/19/2019 101 94 - 109 mmol/L Final     Carbon Dioxide   Date Value Ref Range Status   02/19/2019 26 20 - 32 mmol/L Final     Anion Gap   Date Value Ref Range Status   02/19/2019 8 3 - 14 mmol/L Final     Glucose   Date Value Ref Range Status   02/19/2019 74 70 - 99 mg/dL Final     Urea Nitrogen   Date Value Ref Range Status   02/19/2019 13 7 - 30 mg/dL Final     Creatinine   Date Value Ref Range Status   02/19/2019 0.96 0.66 - 1.25 mg/dL Final     GFR Estimate   Date Value Ref Range Status   02/19/2019 88 >60 mL/min/[1.73_m2] Final     Comment:     Non  GFR Calc  Starting 12/18/2018, serum creatinine based estimated GFR (eGFR) will be   calculated using the Chronic Kidney Disease Epidemiology Collaboration   (CKD-EPI) equation.       Calcium   Date Value Ref Range Status   02/19/2019 8.5 8.5 - 10.1 mg/dL Final     Lab Results   Component Value Date    WBC 4.4 02/19/2019     Lab Results   Component Value Date    RBC 4.42 02/19/2019     Lab Results   Component Value Date    HGB 13.3 02/19/2019     Lab Results   Component Value Date    HCT 40.7 02/19/2019     Lab Results   Component Value Date    MCV 92 02/19/2019     Lab Results   Component Value Date    MCH 30.1 02/19/2019     Lab Results   Component Value Date    MCHC 32.7 02/19/2019     Lab Results   Component Value Date    RDW 13.6 02/19/2019     Lab Results   Component Value Date     02/19/2019     Chest x-ray (2/19):  Findings: Single portable view of the chest. Midline sternotomy wires.  Right [place]-Liz catheter with tip now in the distal right pulmonary  artery. Right chest wall cardiac pacemaker with leads in unchanged  positions. Right PICC terminating in the low SVC. Mild  cardiomegaly,  stable. Costophrenic angles are collimated out of view. No large  pleural effusion. No pneumothorax. No focal airspace consolidations.                                                                  Impression:   1. Interval advancement of the right [place]-Liz catheter, tip  terminating in the distal right pulmonary artery  2. Stable cardiomegaly.        Medications     Medications    amiodarone  200 mg Oral Daily     DULoxetine  20 mg Oral Daily     fluticasone  1 puff Inhalation Daily     levothyroxine  100 mcg Oral Daily     mesalamine  2,400 mg Oral BID     montelukast  10 mg Oral At Bedtime     omeprazole  40 mg Oral QPM     sodium chloride (PF)  3 mL Intracatheter Q8H     spironolactone  12.5 mg Oral Daily     tamsulosin  0.4 mg Oral Daily with supper       HEParin Stopped (02/19/19 0400)     - MEDICATION INSTRUCTIONS -       milrinone 0.25 mcg/kg/min (02/19/19 0637)     - MEDICATION INSTRUCTIONS -         Critical Care ICU Note - Cardiology  Rosendo Beck M.D.      The patient remains unstable in the ICU with on-going need fo  , parenteral medications for the adjustment of blood pressure and cardiac output and maintenance of renal function.  He requires IABP for maintenance of cardiac output in face of marginal blood pressure.    The patient is seen for  pressure modulation; shock requiring vasopressor and or inotropic agents; low cardiac output necessitating  inotropic agents, vasopressors and afterload reducing agents; limited mechanical support requiring IABP; aI personally reviewed:    Arterial and venous blood gases to assess acid base balance, oxygenation, and ventilator settings.      Hemodynamic parameters obtained by central hemodynamic monitoring including RAP, estimated LVEDP, pulmonary artery pressure, cardiac output and vascular resistances in order to adjust fluids and infused medications for blood pressure and cardiac output maintenance.    Volume status, renal function and  nutritional support as judged by intake, output, daily weight and appropriate laboratories.    I reviewed individual and serial imaging studies including echocardiogram, chest x-ray, CT scan    I personally supervised the prescription of parenteral fluids, inotropes, vasodilators , and vasopressors in order to correct cardiac output, maintain urine output and renal function.    I personally met with patient or family to discuss current and future diagnostic and therapeutic strategies    The patient has new pulmonary nodules that will require CT follow-up in one year.. (primary physician notified by letter)    60 minutes critical care.  Agree with note above.

## 2019-02-19 NOTE — ANESTHESIA PROCEDURE NOTES
Perioperative ALESSANDRA Report  Anesthesia Information  ALSESANDRA probe placed and report generated by: : Magan Smith MD Culling, Matthew Douglas, MD          Preanesthesia Checklist:  Patient identified, IV assessed, risks and benefits discussed, monitors and equipment assessed, procedure being performed at surgeon's request and anesthesia consent obtained.  General Procedure Information  Modalities:  2D and 3D  Diagnostic indications for ALESSANDRA:   Cardiomyopathy, other                          .    Echocardiographic and Doppler Measurements  Right Ventricle:  Cavity size dilated.   Global function moderately impaired.     Left Ventricle:  Cavity size dilated.   Global Function severely impaired.   Ejection Fraction 5%.      Ventricular Regional Function:  1- Basal Anteroseptal:  hypokinetic  2- Basal Anterior:  hypokinetic  3- Basal Anterolateral:  hypokinetic  4- Basal Inferolateral:  hypokinetic  5- Basal Inferior:  hypokinetic  6- Basal Inferoseptal:  hypokinetic  7- Mid Anteroseptal:  hypokinetic  8- Mid Anterior:  hypokinetic  9- Mid Anterolateral:  hypokinetic  10- Mid Inferolateral:  hypokinetic  11- Mid Inferior:  hypokinetic  12- Mid Inferoseptal:  hypokinetic  13- Apical Anterior:  hypokinetic  14- Apical Lateral:  hypokinetic  15- Apical Inferior:  hypokinetic  16- Apical Septal:  hypokinetic  17- Jarratt:  hypokinetic    Valves  Aortic Valve: Annulus normal.  Stenosis not present.  Regurgitation absent.  Leaflets normal.  Leaflet motions normal.    Mitral Valve: Annulus dilated.  Regurgitation +2.  Leaflet motions normal.          Aorta: Ascending Aorta: Size normal.        Right Atrium:  Size dilated.    Left Atrium: Size dilated.  Left atrial appendage thrombus.           Post Intervention Findings  . Global function:  Unchanged.   Regional wall motion:  Unchanged   Surgeon(s) notified of all postintervention findings:  Yes  .  .  .   .  .  .  .  .  .  .    Patient appears to have a thrombus in the  left atrial appendage.  Surgeon informed.     Echocardiogram Comments

## 2019-02-19 NOTE — PLAN OF CARE
ABO Verification / [facility] listing:  Mr Larios underwent IABP insertion this afternoon, and has been activated on the Heart transplant waiting list as a status 2.  Hemodynamic qualifications while on Milrinone include:    PAW - 17 via Cantril on 2/18/19 at 15:15   Cardiac Index of 1.6 via Cantril on 2-18-19 at 15:15     Systolic BP of 87 during cath lab procedure on 2-18-19 at 5:42 pm.    Two ABOs were obtained from 11-15-18 and 2-18-19, both documenting blood type O.  After confirming the ABO with a second coordinator, Haris Fleming RN, Mr Larios's name was activated on the UNOS heart transplant list.  Per policy, he will also receive written documentation of this listing.    His status will auto-downgrade sometime after midnight on 3-4-19.

## 2019-02-19 NOTE — PROGRESS NOTES
LVAD/Transplant Social Work Services Progress Note      Date of Initial Social Work Evaluation: 11/16/18, again 1/30/19  Collaborated with: Nursing staff and Cards II    Data: Everton was admitted for balloon pump and heart transplant listing. If he does not get a heart, patient may need LVAD. Will get something this admission  Intervention: Attempted to meet with Everton for admission assessment.  Assessment: Everton is currently in the OR. Unable to talk with Everton today for admission assessment. He was just seen 2 weeks ago. Has support from parents. Everton and parents all live locally, so no need for local housing/lodging resources.  Education provided by DOMENIC: [employer]  Plan:    Discharge Plans in Progress: TBD    Barriers to d/c plan: Unknown transplant date    Follow up Plan: DOMENIC will continue to follow and see Everton when able for admission assessment.

## 2019-02-19 NOTE — ANESTHESIA PROCEDURE NOTES
Arterial Line Procedure Note  Staff:     Anesthesiologist:  Magan Smith MD    Resident/CRNA:  Maryana Valadez    Arterial line performed by resident/CRNA in presence of a teaching physician    Location: In OR After Induction  Procedure Start/Stop Times:     patient identified, IV checked, site marked, risks and benefits discussed, informed consent, monitors and equipment checked and pre-op evaluation      Correct Patient: Yes      Correct Position: Yes      Correct Site: Yes      Correct Procedure: Yes      Correct Laterality:  N/A    Site Marked:  N/A  Line Placement:     Procedure:  Arterial Line    Insertion Site:  Radial    Insertion laterality:  Left    Skin Prep: Chloraprep      Patient Prep: mask, sterile gloves, hat and hand hygiene      Local skin infiltration:  None    Ultrasound Guided?: No      Catheter size:  20 gauge, Quick cath    Cath secured with: other (comment)      Dressing:  Tegaderm    Complications:  None obvious    Arterial waveform: Yes      IBP within 10% of NIBP: Yes

## 2019-02-19 NOTE — ANESTHESIA PROCEDURE NOTES
ALESSANDRA Probe Insertion Note:    Inserted by:  Magan Smith MD (Responsible Anesthesiologist)    Probe Status PRE Insertion: NO obvious damage  Probe type:  Adult 2D    Bite block used:   Yes  Insertion Technique: Jaw Lift  Insertion complications: None obvious    Billing Report:ALESSANDRA report by Anesthesiologist (See Separate Report note)    Probe Status POST Removal: NO obvious damage

## 2019-02-19 NOTE — ANESTHESIA CARE TRANSFER NOTE
Patient: Everton Larios    Procedure(s):  Insert left  Subclavian Balloon Pump,  Removal Right femoral arterial balloom pump sheath    Diagnosis: Heart Failure  Diagnosis Additional Information: No value filed.    Anesthesia Type:   No value filed.     Note:  Airway :Face Mask  Patient transferred to:ICU  Comments: Pt transported to ICU with writerSheila PA and Domo GONZALES with balloon pump in place. Pt denies pain or nausea. Report given to RN. ICU Handoff: Call for PAUSE to initiate/utilize ICU HANDOFF, Identified Patient, Identified Responsible Provider, Reviewed the Pertinent Medical History, Discussed Surgical Course, Reviewed Intra-OP Anesthesia Management and Issues during Anesthesia, Set Expectations for Post Procedure Period and Allowed Opportunity for Questions and Acknowledgement of Understanding      Vitals: (Last set prior to Anesthesia Care Transfer)    CRNA VITALS  2/19/2019 1445 - 2/19/2019 1526      2/19/2019             ART BP:  121/89    SpO2:  100 %    Resp Rate (observed):  20                Electronically Signed By: TAI Saha CRNA  February 19, 2019  3:26 PM

## 2019-02-19 NOTE — OP NOTE
OPERATIVE DATE: 2/19/2019    PRE-OPERATIVE DIAGNOSIS:  1) Nonischemic cardiomyopathy  2) H/O congenital ASD repair  3) Paroxysmal atrial fibrillation  4) Ventricular tachycardia  5) Ulcerative colitis  6) S/P ablation for atrial fibrillation  7) H/O AICD  8) Degenerative joint disease  9) Pierce's esophagus  10) Intermittent asthma  11) Chronic rhinitis  12) Depression  13) Iron deficiency anemia    POST-OPERATIVE DIAGNOSIS:  1) Nonischemic cardiomyopathy  2) H/O congenital ASD repair  3) Paroxysmal atrial fibrillation  4) Ventricular tachycardia  5) Ulcerative colitis  6) S/P ablation for atrial fibrillation  7) H/O AICD  8) Degenerative joint disease  9) Pierce's esophagus  10) Intermittent asthma  11) Chronic rhinitis  12) Depression  13) Iron deficiency anemia    PROCEDURE:  1) Left subclavian chimney graft  2) Placement of left subclavian intra-aortic balloon pump  3) Fluoroscopy with interpretation  4) Removal of right femoral intra-aortic balloon pump    SURGEON: Dewayne House MD    ASSISTANT: Sheila Ram PA-C    ANESTHESIA: GETA    ESTIMATED BLOOD LOSS: 50cc    INDICATIONS:  Mr. ANA ROSA BALDWIN is a 53 year old male admitted with non-ischemic cardiomyopathy, and symptomatic heart failure. I was asked to evaluate for subclavian intra-aortic balloon pump placement.  Risks and benefits of the operation were explained to the patient and their family including, but not limited to, bleeding, infection, stroke and even death.  They understood these risks and agreed to proceed electively.    OPERATIVE REPORT:  The patient was transferred to the operating room and positioned supine on the OR table.  General anesthesia was initiated by the anesthesia team.  Endotracheal intubation and IV access was already in place. The patients chest, abdomen and bilateral lower extremities were clipped, prepped and draped in sterile fashion.  A pre-procedure time-out was performed confirming the correct  patient, correct site and correct procedure.    A left infra-clavicular skin incision was made.  The left subclavian artery was identified.  [id number] IV heparin was administered.  The artery was clamped with profunda clamps.  A longitudinal arteriotomy was made with a knife and extended with Butler scissors.  An 8mm Gelweave graft was anastomosed to the arteriotomy in end-to-side fashion.  Next, a 9F sheath was placed in the end of the graft and secured with 0 ethibond ties.      The sheath was then accessed with a 0.025 wire percutaneously adjacent to the skin incision.  Fluoroscopy was used to assist in placement of the wire in the descending aorta.  Next, a 50cm intra-aortic balloon pump was advanced into the descending aorta.  The femoral balloon pump was stopped and removed.  The subclavian balloon pump was started.  Fluoroscopy confirmed good position of the balloon.  The balloon catheter was secured to the skin.     The wound was made hemostatic.  The incision was closed in layers using vicryl stitches.  The right femoral sheath was removed and manual pressure was held for 20 minutes.  The patient was then transferred from the operating bed to an ICU bed and transferred to the ICU in critical, but stable, condition.    All needle, sponge and instrument counts were correct at the end of the case.    Dewayne Hosue  Cardiothoracic Surgery  Pager: [phone]

## 2019-02-20 ENCOUNTER — APPOINTMENT (OUTPATIENT)
Dept: GENERAL RADIOLOGY | Facility: CLINIC | Age: 54
DRG: 001 | End: 2019-02-20
Attending: STUDENT IN AN ORGANIZED HEALTH CARE EDUCATION/TRAINING PROGRAM
Payer: COMMERCIAL

## 2019-02-20 LAB
ANION GAP SERPL CALCULATED.3IONS-SCNC: 10 MMOL/L (ref 3–14)
ANION GAP SERPL CALCULATED.3IONS-SCNC: 6 MMOL/L (ref 3–14)
BASE DEFICIT BLDV-SCNC: 1.2 MMOL/L
BASE DEFICIT BLDV-SCNC: 1.3 MMOL/L
BASE DEFICIT BLDV-SCNC: 1.6 MMOL/L
BASE EXCESS BLDV CALC-SCNC: 1.5 MMOL/L
BUN SERPL-MCNC: 16 MG/DL (ref 7–30)
BUN SERPL-MCNC: 18 MG/DL (ref 7–30)
CALCIUM SERPL-MCNC: 8.1 MG/DL (ref 8.5–10.1)
CALCIUM SERPL-MCNC: 8.4 MG/DL (ref 8.5–10.1)
CHLORIDE SERPL-SCNC: 101 MMOL/L (ref 94–109)
CHLORIDE SERPL-SCNC: 102 MMOL/L (ref 94–109)
CO2 SERPL-SCNC: 24 MMOL/L (ref 20–32)
CO2 SERPL-SCNC: 25 MMOL/L (ref 20–32)
CREAT SERPL-MCNC: 0.95 MG/DL (ref 0.66–1.25)
CREAT SERPL-MCNC: 0.95 MG/DL (ref 0.66–1.25)
ERYTHROCYTE [DISTWIDTH] IN BLOOD BY AUTOMATED COUNT: 13.3 % (ref 10–15)
ERYTHROCYTE [DISTWIDTH] IN BLOOD BY AUTOMATED COUNT: 13.6 % (ref 10–15)
GFR SERPL CREATININE-BSD FRML MDRD: 89 ML/MIN/{1.73_M2}
GFR SERPL CREATININE-BSD FRML MDRD: 89 ML/MIN/{1.73_M2}
GLUCOSE SERPL-MCNC: 155 MG/DL (ref 70–99)
GLUCOSE SERPL-MCNC: 167 MG/DL (ref 70–99)
HCO3 BLDV-SCNC: 24 MMOL/L (ref 21–28)
HCO3 BLDV-SCNC: 24 MMOL/L (ref 21–28)
HCO3 BLDV-SCNC: 25 MMOL/L (ref 21–28)
HCO3 BLDV-SCNC: 27 MMOL/L (ref 21–28)
HCT VFR BLD AUTO: 38.8 % (ref 40–53)
HCT VFR BLD AUTO: 40 % (ref 40–53)
HGB BLD-MCNC: 12.6 G/DL (ref 13.3–17.7)
HGB BLD-MCNC: 13.2 G/DL (ref 13.3–17.7)
INR PPP: 1.35 (ref 0.86–1.14)
KCT BLD-ACNC: 241 SEC (ref 75–150)
KCT BLD-ACNC: 282 SEC (ref 75–150)
LMWH PPP CHRO-ACNC: 0.12 IU/ML
MAGNESIUM SERPL-MCNC: 2.2 MG/DL (ref 1.6–2.3)
MAGNESIUM SERPL-MCNC: 2.8 MG/DL (ref 1.6–2.3)
MCH RBC QN AUTO: 30.1 PG (ref 26.5–33)
MCH RBC QN AUTO: 30.1 PG (ref 26.5–33)
MCHC RBC AUTO-ENTMCNC: 32.5 G/DL (ref 31.5–36.5)
MCHC RBC AUTO-ENTMCNC: 33 G/DL (ref 31.5–36.5)
MCV RBC AUTO: 91 FL (ref 78–100)
MCV RBC AUTO: 93 FL (ref 78–100)
O2/TOTAL GAS SETTING VFR VENT: 21 %
O2/TOTAL GAS SETTING VFR VENT: NORMAL %
OXYHGB MFR BLDV: 49 %
OXYHGB MFR BLDV: 57 %
OXYHGB MFR BLDV: 61 %
OXYHGB MFR BLDV: 73 %
PCO2 BLDV: 41 MM HG (ref 40–50)
PCO2 BLDV: 43 MM HG (ref 40–50)
PCO2 BLDV: 46 MM HG (ref 40–50)
PCO2 BLDV: 48 MM HG (ref 40–50)
PH BLDV: 7.33 PH (ref 7.32–7.43)
PH BLDV: 7.36 PH (ref 7.32–7.43)
PH BLDV: 7.38 PH (ref 7.32–7.43)
PH BLDV: 7.38 PH (ref 7.32–7.43)
PHOSPHATE SERPL-MCNC: 4.7 MG/DL (ref 2.5–4.5)
PLATELET # BLD AUTO: 128 10E9/L (ref 150–450)
PLATELET # BLD AUTO: 134 10E9/L (ref 150–450)
PO2 BLDV: 29 MM HG (ref 25–47)
PO2 BLDV: 32 MM HG (ref 25–47)
PO2 BLDV: 34 MM HG (ref 25–47)
PO2 BLDV: 43 MM HG (ref 25–47)
POTASSIUM BLD-SCNC: 4 MMOL/L (ref 3.4–5.3)
POTASSIUM SERPL-SCNC: 4.3 MMOL/L (ref 3.4–5.3)
POTASSIUM SERPL-SCNC: 4.7 MMOL/L (ref 3.4–5.3)
RBC # BLD AUTO: 4.19 10E12/L (ref 4.4–5.9)
RBC # BLD AUTO: 4.38 10E12/L (ref 4.4–5.9)
SODIUM SERPL-SCNC: 133 MMOL/L (ref 133–144)
SODIUM SERPL-SCNC: 136 MMOL/L (ref 133–144)
WBC # BLD AUTO: 7.1 10E9/L (ref 4–11)
WBC # BLD AUTO: 8.4 10E9/L (ref 4–11)

## 2019-02-20 PROCEDURE — 25000128 H RX IP 250 OP 636: Performed by: STUDENT IN AN ORGANIZED HEALTH CARE EDUCATION/TRAINING PROGRAM

## 2019-02-20 PROCEDURE — 25000128 H RX IP 250 OP 636: Performed by: INTERNAL MEDICINE

## 2019-02-20 PROCEDURE — 40000275 ZZH STATISTIC RCP TIME EA 10 MIN

## 2019-02-20 PROCEDURE — 25000125 ZZHC RX 250: Performed by: INTERNAL MEDICINE

## 2019-02-20 PROCEDURE — 99291 CRITICAL CARE FIRST HOUR: CPT | Mod: 25 | Performed by: INTERNAL MEDICINE

## 2019-02-20 PROCEDURE — 84132 ASSAY OF SERUM POTASSIUM: CPT | Performed by: INTERNAL MEDICINE

## 2019-02-20 PROCEDURE — 33968 REMOVE AORTIC ASSIST DEVICE: CPT | Mod: GC | Performed by: INTERNAL MEDICINE

## 2019-02-20 PROCEDURE — 85610 PROTHROMBIN TIME: CPT | Performed by: INTERNAL MEDICINE

## 2019-02-20 PROCEDURE — 84100 ASSAY OF PHOSPHORUS: CPT | Performed by: STUDENT IN AN ORGANIZED HEALTH CARE EDUCATION/TRAINING PROGRAM

## 2019-02-20 PROCEDURE — 85520 HEPARIN ASSAY: CPT | Performed by: INTERNAL MEDICINE

## 2019-02-20 PROCEDURE — 85027 COMPLETE CBC AUTOMATED: CPT | Performed by: INTERNAL MEDICINE

## 2019-02-20 PROCEDURE — 25000125 ZZHC RX 250: Performed by: STUDENT IN AN ORGANIZED HEALTH CARE EDUCATION/TRAINING PROGRAM

## 2019-02-20 PROCEDURE — 25000132 ZZH RX MED GY IP 250 OP 250 PS 637: Performed by: STUDENT IN AN ORGANIZED HEALTH CARE EDUCATION/TRAINING PROGRAM

## 2019-02-20 PROCEDURE — 83735 ASSAY OF MAGNESIUM: CPT | Performed by: STUDENT IN AN ORGANIZED HEALTH CARE EDUCATION/TRAINING PROGRAM

## 2019-02-20 PROCEDURE — 40000196 ZZH STATISTIC RAPCV CVP MONITORING

## 2019-02-20 PROCEDURE — 25000128 H RX IP 250 OP 636: Performed by: PHYSICIAN ASSISTANT

## 2019-02-20 PROCEDURE — 40000048 ZZH STATISTIC DAILY SWAN MONITORING

## 2019-02-20 PROCEDURE — 80048 BASIC METABOLIC PNL TOTAL CA: CPT | Performed by: STUDENT IN AN ORGANIZED HEALTH CARE EDUCATION/TRAINING PROGRAM

## 2019-02-20 PROCEDURE — 82805 BLOOD GASES W/O2 SATURATION: CPT | Performed by: STUDENT IN AN ORGANIZED HEALTH CARE EDUCATION/TRAINING PROGRAM

## 2019-02-20 PROCEDURE — 76000 FLUOROSCOPY <1 HR PHYS/QHP: CPT | Performed by: INTERNAL MEDICINE

## 2019-02-20 PROCEDURE — C1769 GUIDE WIRE: HCPCS | Performed by: INTERNAL MEDICINE

## 2019-02-20 PROCEDURE — 85027 COMPLETE CBC AUTOMATED: CPT | Performed by: STUDENT IN AN ORGANIZED HEALTH CARE EDUCATION/TRAINING PROGRAM

## 2019-02-20 PROCEDURE — 82805 BLOOD GASES W/O2 SATURATION: CPT | Performed by: INTERNAL MEDICINE

## 2019-02-20 PROCEDURE — 71045 X-RAY EXAM CHEST 1 VIEW: CPT

## 2019-02-20 PROCEDURE — 40000014 ZZH STATISTIC ARTERIAL MONITORING DAILY

## 2019-02-20 PROCEDURE — 20000004 ZZH R&B ICU UMMC

## 2019-02-20 PROCEDURE — 27210794 ZZH OR GENERAL SUPPLY STERILE: Performed by: INTERNAL MEDICINE

## 2019-02-20 PROCEDURE — 25000132 ZZH RX MED GY IP 250 OP 250 PS 637: Performed by: INTERNAL MEDICINE

## 2019-02-20 PROCEDURE — 85520 HEPARIN ASSAY: CPT | Performed by: STUDENT IN AN ORGANIZED HEALTH CARE EDUCATION/TRAINING PROGRAM

## 2019-02-20 PROCEDURE — 27210305 ZZH CATH BALLOON IABP

## 2019-02-20 PROCEDURE — 33968 REMOVE AORTIC ASSIST DEVICE: CPT | Performed by: INTERNAL MEDICINE

## 2019-02-20 PROCEDURE — 40000076 ZZH STATISTIC IABP MONITORING

## 2019-02-20 RX ORDER — HEPARIN SODIUM 10000 [USP'U]/100ML
0-3500 INJECTION, SOLUTION INTRAVENOUS CONTINUOUS
Status: DISCONTINUED | OUTPATIENT
Start: 2019-02-20 | End: 2019-02-23

## 2019-02-20 RX ORDER — FUROSEMIDE 10 MG/ML
40 INJECTION INTRAMUSCULAR; INTRAVENOUS ONCE
Status: COMPLETED | OUTPATIENT
Start: 2019-02-20 | End: 2019-02-20

## 2019-02-20 RX ORDER — FUROSEMIDE 10 MG/ML
20 INJECTION INTRAMUSCULAR; INTRAVENOUS ONCE
Status: COMPLETED | OUTPATIENT
Start: 2019-02-20 | End: 2019-02-20

## 2019-02-20 RX ORDER — FENTANYL CITRATE 50 UG/ML
INJECTION, SOLUTION INTRAMUSCULAR; INTRAVENOUS
Status: DISCONTINUED | OUTPATIENT
Start: 2019-02-20 | End: 2019-02-20 | Stop reason: HOSPADM

## 2019-02-20 RX ADMIN — FUROSEMIDE 40 MG: 10 INJECTION, SOLUTION INTRAVENOUS at 19:39

## 2019-02-20 RX ADMIN — BENZOCAINE, MENTHOL 1 LOZENGE: 15; 3.6 LOZENGE ORAL at 19:43

## 2019-02-20 RX ADMIN — ACETAMINOPHEN 650 MG: 325 TABLET, FILM COATED ORAL at 12:20

## 2019-02-20 RX ADMIN — ACETAMINOPHEN 650 MG: 325 TABLET, FILM COATED ORAL at 02:09

## 2019-02-20 RX ADMIN — OXYCODONE HYDROCHLORIDE 5 MG: 5 TABLET ORAL at 20:46

## 2019-02-20 RX ADMIN — AMIODARONE HYDROCHLORIDE 200 MG: 200 TABLET ORAL at 07:58

## 2019-02-20 RX ADMIN — HEPARIN SODIUM 1000 UNITS/HR: 10000 INJECTION, SOLUTION INTRAVENOUS at 15:12

## 2019-02-20 RX ADMIN — DULOXETINE 20 MG: 20 CAPSULE, DELAYED RELEASE ORAL at 07:58

## 2019-02-20 RX ADMIN — ACETAMINOPHEN 650 MG: 325 TABLET, FILM COATED ORAL at 19:42

## 2019-02-20 RX ADMIN — FUROSEMIDE 20 MG: 10 INJECTION, SOLUTION INTRAVENOUS at 10:55

## 2019-02-20 RX ADMIN — Medication 12.5 MG: at 08:04

## 2019-02-20 RX ADMIN — CEFAZOLIN 1 G: 1 INJECTION, POWDER, FOR SOLUTION INTRAMUSCULAR; INTRAVENOUS at 00:12

## 2019-02-20 RX ADMIN — POTASSIUM CHLORIDE 20 MEQ: 1.5 POWDER, FOR SOLUTION ORAL at 22:12

## 2019-02-20 RX ADMIN — Medication 5 MG/HR: at 11:21

## 2019-02-20 RX ADMIN — BENZOCAINE, MENTHOL 1 LOZENGE: 15; 3.6 LOZENGE ORAL at 08:08

## 2019-02-20 RX ADMIN — MESALAMINE 2400 MG: 800 TABLET, DELAYED RELEASE ORAL at 08:04

## 2019-02-20 RX ADMIN — BENZOCAINE, MENTHOL 1 LOZENGE: 15; 3.6 LOZENGE ORAL at 14:50

## 2019-02-20 RX ADMIN — OXYCODONE HYDROCHLORIDE 5 MG: 5 TABLET ORAL at 02:09

## 2019-02-20 RX ADMIN — SENNOSIDES AND DOCUSATE SODIUM 1 TABLET: 8.6; 5 TABLET ORAL at 12:21

## 2019-02-20 RX ADMIN — MONTELUKAST SODIUM 10 MG: 10 TABLET, COATED ORAL at 22:12

## 2019-02-20 RX ADMIN — POLYETHYLENE GLYCOL 3350 17 G: 17 POWDER, FOR SOLUTION ORAL at 12:21

## 2019-02-20 RX ADMIN — OMEPRAZOLE 40 MG: 20 CAPSULE, DELAYED RELEASE ORAL at 19:42

## 2019-02-20 RX ADMIN — MILRINONE LACTATE 0.25 MCG/KG/MIN: 200 INJECTION, SOLUTION INTRAVENOUS at 11:33

## 2019-02-20 RX ADMIN — HEPARIN SODIUM 1000 UNITS/HR: 10000 INJECTION, SOLUTION INTRAVENOUS at 12:26

## 2019-02-20 RX ADMIN — OXYCODONE HYDROCHLORIDE 5 MG: 5 TABLET ORAL at 14:48

## 2019-02-20 RX ADMIN — FLUTICASONE FUROATE 1 PUFF: 200 POWDER RESPIRATORY (INHALATION) at 08:04

## 2019-02-20 RX ADMIN — BENZOCAINE, MENTHOL 1 LOZENGE: 15; 3.6 LOZENGE ORAL at 20:47

## 2019-02-20 RX ADMIN — OXYCODONE HYDROCHLORIDE 5 MG: 5 TABLET ORAL at 08:30

## 2019-02-20 RX ADMIN — CEFAZOLIN 1 G: 1 INJECTION, POWDER, FOR SOLUTION INTRAMUSCULAR; INTRAVENOUS at 07:58

## 2019-02-20 RX ADMIN — MESALAMINE 2400 MG: 800 TABLET, DELAYED RELEASE ORAL at 19:43

## 2019-02-20 RX ADMIN — LEVOTHYROXINE SODIUM 100 MCG: 100 TABLET ORAL at 07:58

## 2019-02-20 RX ADMIN — TAMSULOSIN HYDROCHLORIDE 0.4 MG: 0.4 CAPSULE ORAL at 16:47

## 2019-02-20 RX ADMIN — Medication 1 MG: at 20:46

## 2019-02-20 ASSESSMENT — ACTIVITIES OF DAILY LIVING (ADL)
ADLS_ACUITY_SCORE: 12
ADLS_ACUITY_SCORE: 11
ADLS_ACUITY_SCORE: 11
ADLS_ACUITY_SCORE: 12
ADLS_ACUITY_SCORE: 12

## 2019-02-20 ASSESSMENT — PAIN DESCRIPTION - DESCRIPTORS: DESCRIPTORS: DISCOMFORT

## 2019-02-20 NOTE — PROGRESS NOTES
Pre-Transplant Patient Social Work Assessment     Patient Name: Everton Larios  : 1965  Age: 53 year old  MRN: [id number]  Date of transplant: Patient without heart transplant or VAD at this point.     Patient known to me from follow up in the transplant/LVAD program.  Admitted on 19 for inpatient hospitalization and listing for heart transplant status 2.  Seen today to update assessment.      Presenting Information   Living Situation: Lives alone in a Town home in [place]. There are no stairs here, but is considering moving into another apartment within the same complex.   Functional Status: Independent with ADL's and able to drive. Had to quit his part-time job at [employer], however, due to worsening medical condition  Cultural/Language/Spiritual Considerations: None    Support System  Primary Support Person Parents  Other support:  None identified  Plan for support in immediate post-hospital period: Will discharge to parents' home for 24-hour care post-transplant or VAD    Health Care Directive  Decision Maker: Patient  Alternate Decision Maker: Mom and Dad  Health Care Directive: Does not have a HCD. Did not address during today's visit. Parents would be appropriate decision-makers. Patient has been given blank copies in the past and has heard the explanation of purpose of directive and how to complete.    Mental Health/Coping:   History of Mental Health: Denies MH issues, but has seen a therapist in the past and is taking medications for mood  History of Chemical Health: Distant history of alcohol use, but cut back when he developed heart issues. Most recently has quit alcohol all together.  Current status: N/A  Coping: Seems adequate  Services Needed/Recommended: Transplant support groups if able to attend    Financial   Income: Social Security  Impact of transplant on income: May have copays on his immunosuppression medications  Insurance and medication coverage: Medicare advantage plan  with [employer]  Financial concerns: Mentioned that he is concerned about monthly bills now that he isn't working. Discussed possibility of maryam assistance during his transplant or VAD hospitalization/recovery period.  Resources needed: May need financial grants through [employer]    Discharge Plan   Patient and family discharge goal: Hospitalized for now waiting for heart transplant. Will get VAD if no transplant available. Will want to return home when medically stable, but will need 24-hour care from parents  Barriers to discharge: Not ready for Discharge    Education provided by DOMENIC: Social Work role inpatient setting, availability of support groups, parking information    Assessment and recommendations and plan:  Everton underwent psychosocial evaluation starting in November of 2016. This writer saw him again for evaluation in January 2019. Good psychosocial candidate for either LVAD or transplant. Patient having financial issues, however, with monthly expenses as a result of not working. May also have copays on immunosuppression medication. Family supportive, but not sure they can provide any financial help. Would benefit from financial grants. DOMENIC will continue to follow him during this hospitalization for psychosocial issues that arise as a result of transplant or VAD.

## 2019-02-20 NOTE — PLAN OF CARE
S:  Pt to OR for exchange of femoral IABP for subclavian IABP.  Pt c/o pain at maddox catheter site.  IABP site CDI.  V/AV paced. R groin site flat/CDI. Femstop removed at ~1800.  Weak palpable pedal pulses bilaterally.  A&Ox3. Passed initial swallow evaluation.  On RA sating high 90's.  K replaced x3 this shift.  B:  Pt s/p subclavian IABP, to wait on heart tx list.  A:  Pt hemodynamically stable.  C/o mild pain at every IV site, and site of discontinued IABP, ET, and maddox.  Tolerating po intake, no swallowing issues post extubation.  P:   Off bedrest at 2000. Monitor v/s, cardiac rhythm, and OR site closely.  Start heparin gtt per order at 2000 if subclavian IABP site and R femoral discontinued IABP site for signs bleeding.

## 2019-02-20 NOTE — ANESTHESIA POSTPROCEDURE EVALUATION
Anesthesia POST Procedure Evaluation    Patient: Everton Larios   MRN:     [id number] Gender:   male   Age:    53 year old :      1965        Preoperative Diagnosis: Heart Failure   Procedure(s):  Insert left  Subclavian Balloon Pump,  Removal Right femoral arterial balloom pump sheath   Postop Comments: No value filed.       Anesthesia Type:  General    Reportable Event: NO     PAIN: Uncomplicated   Sign Out status: Comfortable, Well controlled pain     PONV: No PONV   Sign Out status:  No Nausea or Vomiting     Neuro/Psych: Uneventful perioperative course   Sign Out Status: Preoperative baseline; Age appropriate mentation     Airway/Resp.: Uneventful perioperative course   Sign Out Status: Non labored breathing, age appropriate RR; Resp. Status within EXPECTED Parameters     CV: Uneventful perioperative course   Sign Out status: Appropriate BP and perfusion indices; Appropriate HR/Rhythm     Disposition:   Sign Out in:  ICU  Disposition:  ICU  Recovery Course: Uneventful  Follow-Up: Not required           Last Anesthesia Record Vitals:  CRNA VITALS  2019 1445 - 2019 1545      2019             ART BP:  121/89    SpO2:  100 %    Resp Rate (observed):  20          Last PACU/Preop Vitals:  Vitals:    19 1645 19 1700 19 1715   BP:      Pulse:      Resp:  18    Temp:      SpO2: 99% 96% 95%         Electronically Signed By: Magan Lopes MD, 2019, 6:00 PM

## 2019-02-20 NOTE — PLAN OF CARE
No significant events overnight. No changes to neuro status, PERRL, following commands, off bedrest, afebrile. Intermittent c/o pain to R neck (PA cath) and L flank (IABP subclav), moderate relief with PRN 5mg Oxy and 650mg Tylenol PRN.  Pt requested 2L nc while sleeping, sats mid 90s, LS clear,diminished bilat. Remains 100% paced perm pacer/ICD 60-80s, pulses palpable throughout, MAP >65.  IABP 1:1, ekg trigger, augmenting 100-120s, no alarms overnight.  Prior IABP site to R groin is soft with erythema, no hematoma present. CVP 16, PA 48/22, SVO2 71, CO 5.2, CI 3.1, .  Milrinone gtt remains at 0.25, Heparin gtt started and titrated up to 850units/hr, recheck hep10a at 1000.  UOP 100ml total overnight using urinal, pt reports dysuria, no BMs.  Magnesium replaced, will continue to update MD with any changes in condition per protocol.

## 2019-02-20 NOTE — PROGRESS NOTES
Cardiology Progress Note  Everton Larios MRN: [id number]  Age: 53 year old, : 1965  02/20/19            Changes Today:   - currently status 2 for heart transplant  - continue milrinone at 0.25 mcg/kg/min  - restart lasix gtt at 5 mg/hr with bolus  - BMP BID  - continue to monitor Halma numbers Q6H  - remove L radial arterial line        Assessment and Plan:     Everton Larios is a 52 yo gentleman with a h/o NICM, HFrEF (EF 15%) s/p ICD placement (, generator change ), congenital ASD repair in childhood, paroxysmal atrial fibrillation s/p ablation and cardioversion, hypothyroidism, EtOH abuse, ulcerative colitis, remote history of GI bleed with splenic embolization, and a recent admission in 2018 for ambulatory cardiogenic shock requiring initiation of IV inotropes, who was admitted for LVAD placement vs heart transplantation.     # Chronic decompensated systolic heart failure s/p L subclavin balloon pump ()  # Ambulatory cardiogenic shock, inotrope dependent  # NICM s/p ICD  # H/o congenital ASD repair  NYHA class: IV  AHA stage: D  Etiology: NICM     Patient notes that when fluid builds up, it is usually in his abdomen, but is currently asymptomatic. Has been short of breath with at least 2 blocks of ambulation. Currently undergoing process of LVAD vs heart transplant now that work up has been completed. Halma numbers on admission showed CVP 11, CI 3.2, PCWP 24, and SVR 1600; PCWP slightly elevated from last RHC.  - currently status 2 for heart transplant  - continue milrinone at 0.25 mcg/kg/min  - restart lasix gtt at 5 mg/hr with bolus  - continue spironolactone 12.5 mg daily  - continue to monitor Halma numbers Q6H  - electrolyte protocol ordered, held PTA KCl     # Paroxysmal Afib s/p ablation  - continue low dose heparin gtt while awaiting transplant  - held PTA warfarin  - continue PTA amiodarone 200 mg daily  - not on beta-blocker at this time     #  Hypothyroidism: continue PTA levothyroxine  # Ulcerative colitis: currently in remission, continue PTA mesalamine  # GERD: continue PTA omeprazole  # BPH: continue PTA tamsulosin  # Depression: continue PTA duloxetine  # H/o EtOH abuse: currently denies any EtOH since 4 months ago    # New pulmonary nodules  Multiple scattered <4 mm calcified and noncalcified nodules found incidentally on CT chest on 2/18, apparently was not identified on prior CT imaging. He does have a 25 pack year smoking history, quit in 2008. Given the intermediate risk for malignancy from significant smoking history, will need repeat chest CT in 1 year per Fleischner criteria to follow up nodules for any growth or transformation.  - will need repeat chest CT in 1 year to f/u pulmonary nodules    FEN: 2g Na, 2L fluid restriction, NPO at MN for IABP 2/18  Ppx: heparin gtt  LDA: PIV, R PICC, RIJ PA catheter, RFA balloon pump  Dispo: Pending heart transplant this admission, currently status 2     CODE: Full     Patient was seen and discussed with Dr. Beck, who agrees with the assessment and plan.    Saud Jaeger MD  Internal Medicine PGY-2  Pager [phone]          Subjective     RN notes reviewed, NAEO. He denies any SOB, chest pain, or abdominal pain. He denies any swelling at right groin where balloon pump was removed. Heparin gtt was restarted over night.          Objective     Vitals:  Temp:  [97.8  F (36.6  C)-98.4  F (36.9  C)] 97.8  F (36.6  C)  Pulse:  [60-63] 63  Heart Rate:  [60-88] 64  Resp:  [16-18] 16  BP: ()/(45-71) 65/60  MAP:  [64 mmHg-123 mmHg] 69 mmHg  Arterial Line BP: ()/() 96/41  SpO2:  [91 %-100 %] 97 %    Vitals:    02/17/19 1330 02/18/19 0500   Weight: 63.5 kg (140 lb 1.6 oz) 60.3 kg (133 lb)     [place]: CVP 18, PA 46/26, PCWP 24 (was 20), Jaden CO 5.2, Jaden CI 3.1, , SvO2 73 (was 76)     General:  Alert, sitting up in bed, no acute distress.  HEENT: Atraumatic, anicteric sclera,  extraocular motion intact, nares dry, mucus membranes moist.  CV: Normal rate, regular rhythm. S1, prominent S2.  No murmurs, rubs or gallops. JVD not seen.  Resp: Clear to auscultation bilaterally, no accessory muscle use.  Abdomen: Bowel sounds +, soft, nontender, no hepatosplenomegaly, no masses.  Extremities: No peripheral edema, warm and well perfused.  Skin: No rashes, bruising, or jaundice.  Neuro: Alert, oriented x 3, symmetric facial expressions, moving extremities equally  Psych: Pleasant affect.          Data:     Last Comprehensive Metabolic Panel:  Sodium   Date Value Ref Range Status   02/20/2019 133 133 - 144 mmol/L Final     Potassium   Date Value Ref Range Status   02/20/2019 4.7 3.4 - 5.3 mmol/L Final     Chloride   Date Value Ref Range Status   02/20/2019 101 94 - 109 mmol/L Final     Carbon Dioxide   Date Value Ref Range Status   02/20/2019 25 20 - 32 mmol/L Final     Anion Gap   Date Value Ref Range Status   02/20/2019 6 3 - 14 mmol/L Final     Glucose   Date Value Ref Range Status   02/20/2019 167 (H) 70 - 99 mg/dL Final     Urea Nitrogen   Date Value Ref Range Status   02/20/2019 16 7 - 30 mg/dL Final     Creatinine   Date Value Ref Range Status   02/20/2019 0.95 0.66 - 1.25 mg/dL Final     GFR Estimate   Date Value Ref Range Status   02/20/2019 89 >60 mL/min/[1.73_m2] Final     Comment:     Non  GFR Calc  Starting 12/18/2018, serum creatinine based estimated GFR (eGFR) will be   calculated using the Chronic Kidney Disease Epidemiology Collaboration   (CKD-EPI) equation.       Calcium   Date Value Ref Range Status   02/20/2019 8.4 (L) 8.5 - 10.1 mg/dL Final     Lab Results   Component Value Date    WBC 7.1 02/20/2019     Lab Results   Component Value Date    RBC 4.19 02/20/2019     Lab Results   Component Value Date    HGB 12.6 02/20/2019     Lab Results   Component Value Date    HCT 38.8 02/20/2019     Lab Results   Component Value Date    MCV 93 02/20/2019     Lab Results    Component Value Date    MCH 30.1 02/20/2019     Lab Results   Component Value Date    MCHC 32.5 02/20/2019     Lab Results   Component Value Date    RDW 13.3 02/20/2019     Lab Results   Component Value Date     02/20/2019     Chest x-ray (2/20):    Impression:   1. Right [place] catheter tip in stable position in the right  pulmonary artery. Remaining support devices in stable position.  2. Stable cardiomegaly with prominent right hilum, likely mild  pulmonary congestion.        Medications     Medications    amiodarone  200 mg Oral Daily     ceFAZolin  1 g Intravenous Q8H     DULoxetine  20 mg Oral Daily     fluticasone  1 puff Inhalation Daily     levothyroxine  100 mcg Oral Daily     mesalamine  2,400 mg Oral BID     montelukast  10 mg Oral At Bedtime     omeprazole  40 mg Oral QPM     sodium chloride (PF)  3 mL Intracatheter Q8H     spironolactone  12.5 mg Oral Daily     tamsulosin  0.4 mg Oral Daily with supper       EPINEPHrine IV infusion ADULT       HEParin 850 Units/hr (02/20/19 0700)     - MEDICATION INSTRUCTIONS -       milrinone 0.25 mcg/kg/min (02/20/19 0700)     norepinephrine       - MEDICATION INSTRUCTIONS -         Critical Care ICU Note - Cardiology  Rosendo Beck M.D.    Impression:  Recent surgery  Cardiogenic shock  Acute and chronic congestive heart failure  IABP insertion complicated by mechanical obstruction    The patient remains unstable in the ICU with on-going need for ventilator support, parenteral medications for the adjustment of blood pressure and cardiac output and maintenance of renal function.      The patient is seen for  shock requiring vasopressor and or inotropic agents; low cardiac output necessitating  inotropic agents, vasopressors and afterload reducing agents; limited mechanical support requiring IABP;   I personally reviewed:    Critical care 30 minues

## 2019-02-21 ENCOUNTER — APPOINTMENT (OUTPATIENT)
Dept: GENERAL RADIOLOGY | Facility: CLINIC | Age: 54
DRG: 001 | End: 2019-02-21
Attending: INTERNAL MEDICINE
Payer: COMMERCIAL

## 2019-02-21 ENCOUNTER — ANESTHESIA EVENT (OUTPATIENT)
Dept: SURGERY | Facility: CLINIC | Age: 54
DRG: 001 | End: 2019-02-21
Payer: COMMERCIAL

## 2019-02-21 ENCOUNTER — APPOINTMENT (OUTPATIENT)
Dept: GENERAL RADIOLOGY | Facility: CLINIC | Age: 54
DRG: 001 | End: 2019-02-21
Attending: STUDENT IN AN ORGANIZED HEALTH CARE EDUCATION/TRAINING PROGRAM
Payer: COMMERCIAL

## 2019-02-21 ENCOUNTER — ANESTHESIA (OUTPATIENT)
Dept: SURGERY | Facility: CLINIC | Age: 54
DRG: 001 | End: 2019-02-21
Payer: COMMERCIAL

## 2019-02-21 ENCOUNTER — APPOINTMENT (OUTPATIENT)
Dept: OCCUPATIONAL THERAPY | Facility: CLINIC | Age: 54
DRG: 001 | End: 2019-02-21
Attending: INTERNAL MEDICINE
Payer: COMMERCIAL

## 2019-02-21 LAB
ANION GAP SERPL CALCULATED.3IONS-SCNC: 11 MMOL/L (ref 3–14)
ANION GAP SERPL CALCULATED.3IONS-SCNC: 8 MMOL/L (ref 3–14)
BASE DEFICIT BLDV-SCNC: 0.3 MMOL/L
BASE EXCESS BLDV CALC-SCNC: 0.1 MMOL/L
BASE EXCESS BLDV CALC-SCNC: 1.5 MMOL/L
BASE EXCESS BLDV CALC-SCNC: 3.6 MMOL/L
BASE EXCESS BLDV CALC-SCNC: 5.6 MMOL/L
BUN SERPL-MCNC: 18 MG/DL (ref 7–30)
BUN SERPL-MCNC: 19 MG/DL (ref 7–30)
CALCIUM SERPL-MCNC: 8.2 MG/DL (ref 8.5–10.1)
CALCIUM SERPL-MCNC: 8.5 MG/DL (ref 8.5–10.1)
CHLORIDE SERPL-SCNC: 100 MMOL/L (ref 94–109)
CHLORIDE SERPL-SCNC: 99 MMOL/L (ref 94–109)
CO2 SERPL-SCNC: 26 MMOL/L (ref 20–32)
CO2 SERPL-SCNC: 29 MMOL/L (ref 20–32)
CREAT SERPL-MCNC: 0.88 MG/DL (ref 0.66–1.25)
CREAT SERPL-MCNC: 1.04 MG/DL (ref 0.66–1.25)
ERYTHROCYTE [DISTWIDTH] IN BLOOD BY AUTOMATED COUNT: 13.6 % (ref 10–15)
GFR SERPL CREATININE-BSD FRML MDRD: 80 ML/MIN/{1.73_M2}
GFR SERPL CREATININE-BSD FRML MDRD: >90 ML/MIN/{1.73_M2}
GLUCOSE SERPL-MCNC: 129 MG/DL (ref 70–99)
GLUCOSE SERPL-MCNC: 85 MG/DL (ref 70–99)
HCO3 BLDV-SCNC: 24 MMOL/L (ref 21–28)
HCO3 BLDV-SCNC: 25 MMOL/L (ref 21–28)
HCO3 BLDV-SCNC: 26 MMOL/L (ref 21–28)
HCO3 BLDV-SCNC: 30 MMOL/L (ref 21–28)
HCO3 BLDV-SCNC: 31 MMOL/L (ref 21–28)
HCT VFR BLD AUTO: 38.8 % (ref 40–53)
HGB BLD-MCNC: 12.7 G/DL (ref 13.3–17.7)
INR PPP: 1.33 (ref 0.86–1.14)
LMWH PPP CHRO-ACNC: 0.34 IU/ML
MAGNESIUM SERPL-MCNC: 1.9 MG/DL (ref 1.6–2.3)
MAGNESIUM SERPL-MCNC: 2.1 MG/DL (ref 1.6–2.3)
MCH RBC QN AUTO: 30 PG (ref 26.5–33)
MCHC RBC AUTO-ENTMCNC: 32.7 G/DL (ref 31.5–36.5)
MCV RBC AUTO: 92 FL (ref 78–100)
O2/TOTAL GAS SETTING VFR VENT: 21 %
O2/TOTAL GAS SETTING VFR VENT: ABNORMAL %
OXYHGB MFR BLDV: 50 %
OXYHGB MFR BLDV: 51 %
OXYHGB MFR BLDV: 52 %
OXYHGB MFR BLDV: 53 %
OXYHGB MFR BLDV: 55 %
PCO2 BLDV: 39 MM HG (ref 40–50)
PCO2 BLDV: 40 MM HG (ref 40–50)
PCO2 BLDV: 40 MM HG (ref 40–50)
PCO2 BLDV: 46 MM HG (ref 40–50)
PCO2 BLDV: 50 MM HG (ref 40–50)
PH BLDV: 7.38 PH (ref 7.32–7.43)
PH BLDV: 7.4 PH (ref 7.32–7.43)
PH BLDV: 7.41 PH (ref 7.32–7.43)
PH BLDV: 7.42 PH (ref 7.32–7.43)
PH BLDV: 7.43 PH (ref 7.32–7.43)
PHOSPHATE SERPL-MCNC: 4.1 MG/DL (ref 2.5–4.5)
PLATELET # BLD AUTO: 119 10E9/L (ref 150–450)
PO2 BLDV: 29 MM HG (ref 25–47)
PO2 BLDV: 29 MM HG (ref 25–47)
PO2 BLDV: 30 MM HG (ref 25–47)
PO2 BLDV: 31 MM HG (ref 25–47)
PO2 BLDV: 31 MM HG (ref 25–47)
POTASSIUM BLD-SCNC: 2.6 MMOL/L (ref 3.4–5.3)
POTASSIUM BLD-SCNC: 3.9 MMOL/L (ref 3.4–5.3)
POTASSIUM SERPL-SCNC: 3.6 MMOL/L (ref 3.4–5.3)
POTASSIUM SERPL-SCNC: 3.9 MMOL/L (ref 3.4–5.3)
RBC # BLD AUTO: 4.24 10E12/L (ref 4.4–5.9)
SODIUM SERPL-SCNC: 136 MMOL/L (ref 133–144)
SODIUM SERPL-SCNC: 137 MMOL/L (ref 133–144)
WBC # BLD AUTO: 7.7 10E9/L (ref 4–11)

## 2019-02-21 PROCEDURE — 25000132 ZZH RX MED GY IP 250 OP 250 PS 637: Performed by: INTERNAL MEDICINE

## 2019-02-21 PROCEDURE — 97165 OT EVAL LOW COMPLEX 30 MIN: CPT | Mod: GO | Performed by: OCCUPATIONAL THERAPIST

## 2019-02-21 PROCEDURE — 25000125 ZZHC RX 250: Performed by: SURGERY

## 2019-02-21 PROCEDURE — 27210305 ZZH CATH BALLOON IABP

## 2019-02-21 PROCEDURE — 5A02210 ASSISTANCE WITH CARDIAC OUTPUT USING BALLOON PUMP, CONTINUOUS: ICD-10-PCS | Performed by: SURGERY

## 2019-02-21 PROCEDURE — 25000128 H RX IP 250 OP 636: Performed by: NURSE ANESTHETIST, CERTIFIED REGISTERED

## 2019-02-21 PROCEDURE — C1725 CATH, TRANSLUMIN NON-LASER: HCPCS | Performed by: SURGERY

## 2019-02-21 PROCEDURE — 40000275 ZZH STATISTIC RCP TIME EA 10 MIN

## 2019-02-21 PROCEDURE — 84132 ASSAY OF SERUM POTASSIUM: CPT | Performed by: STUDENT IN AN ORGANIZED HEALTH CARE EDUCATION/TRAINING PROGRAM

## 2019-02-21 PROCEDURE — 40000014 ZZH STATISTIC ARTERIAL MONITORING DAILY

## 2019-02-21 PROCEDURE — 99233 SBSQ HOSP IP/OBS HIGH 50: CPT | Mod: GC | Performed by: INTERNAL MEDICINE

## 2019-02-21 PROCEDURE — 25000132 ZZH RX MED GY IP 250 OP 250 PS 637: Performed by: STUDENT IN AN ORGANIZED HEALTH CARE EDUCATION/TRAINING PROGRAM

## 2019-02-21 PROCEDURE — 40000196 ZZH STATISTIC RAPCV CVP MONITORING

## 2019-02-21 PROCEDURE — 85610 PROTHROMBIN TIME: CPT | Performed by: STUDENT IN AN ORGANIZED HEALTH CARE EDUCATION/TRAINING PROGRAM

## 2019-02-21 PROCEDURE — 84100 ASSAY OF PHOSPHORUS: CPT | Performed by: STUDENT IN AN ORGANIZED HEALTH CARE EDUCATION/TRAINING PROGRAM

## 2019-02-21 PROCEDURE — 25000128 H RX IP 250 OP 636: Performed by: SURGERY

## 2019-02-21 PROCEDURE — 86923 COMPATIBILITY TEST ELECTRIC: CPT | Performed by: INTERNAL MEDICINE

## 2019-02-21 PROCEDURE — 25800030 ZZH RX IP 258 OP 636: Performed by: INTERNAL MEDICINE

## 2019-02-21 PROCEDURE — 83735 ASSAY OF MAGNESIUM: CPT | Performed by: STUDENT IN AN ORGANIZED HEALTH CARE EDUCATION/TRAINING PROGRAM

## 2019-02-21 PROCEDURE — 85520 HEPARIN ASSAY: CPT | Performed by: STUDENT IN AN ORGANIZED HEALTH CARE EDUCATION/TRAINING PROGRAM

## 2019-02-21 PROCEDURE — 40000277 XR SURGERY CARM FLUORO LESS THAN 5 MIN W STILLS: Mod: TC

## 2019-02-21 PROCEDURE — 40000081 ZZH STATISTIC INSERT IABP

## 2019-02-21 PROCEDURE — 25000128 H RX IP 250 OP 636: Performed by: STUDENT IN AN ORGANIZED HEALTH CARE EDUCATION/TRAINING PROGRAM

## 2019-02-21 PROCEDURE — 80048 BASIC METABOLIC PNL TOTAL CA: CPT | Performed by: STUDENT IN AN ORGANIZED HEALTH CARE EDUCATION/TRAINING PROGRAM

## 2019-02-21 PROCEDURE — 36000070 ZZH SURGERY LEVEL 5 EA 15 ADDTL MIN - UMMC: Performed by: SURGERY

## 2019-02-21 PROCEDURE — C1769 GUIDE WIRE: HCPCS | Performed by: SURGERY

## 2019-02-21 PROCEDURE — 25000128 H RX IP 250 OP 636: Performed by: PHYSICIAN ASSISTANT

## 2019-02-21 PROCEDURE — 40000048 ZZH STATISTIC DAILY SWAN MONITORING

## 2019-02-21 PROCEDURE — 25000128 H RX IP 250 OP 636: Performed by: INTERNAL MEDICINE

## 2019-02-21 PROCEDURE — 86900 BLOOD TYPING SEROLOGIC ABO: CPT | Performed by: INTERNAL MEDICINE

## 2019-02-21 PROCEDURE — 36000072 ZZH SURGERY LEVEL 5 W FLUORO 1ST 30 MIN - UMMC: Performed by: SURGERY

## 2019-02-21 PROCEDURE — 40000076 ZZH STATISTIC IABP MONITORING

## 2019-02-21 PROCEDURE — 85027 COMPLETE CBC AUTOMATED: CPT | Performed by: STUDENT IN AN ORGANIZED HEALTH CARE EDUCATION/TRAINING PROGRAM

## 2019-02-21 PROCEDURE — 25000125 ZZHC RX 250: Performed by: STUDENT IN AN ORGANIZED HEALTH CARE EDUCATION/TRAINING PROGRAM

## 2019-02-21 PROCEDURE — 86901 BLOOD TYPING SEROLOGIC RH(D): CPT | Performed by: INTERNAL MEDICINE

## 2019-02-21 PROCEDURE — 82805 BLOOD GASES W/O2 SATURATION: CPT | Performed by: STUDENT IN AN ORGANIZED HEALTH CARE EDUCATION/TRAINING PROGRAM

## 2019-02-21 PROCEDURE — 37000009 ZZH ANESTHESIA TECHNICAL FEE, EACH ADDTL 15 MIN: Performed by: SURGERY

## 2019-02-21 PROCEDURE — 25800030 ZZH RX IP 258 OP 636: Performed by: NURSE ANESTHETIST, CERTIFIED REGISTERED

## 2019-02-21 PROCEDURE — 25000125 ZZHC RX 250: Performed by: INTERNAL MEDICINE

## 2019-02-21 PROCEDURE — 86850 RBC ANTIBODY SCREEN: CPT | Performed by: INTERNAL MEDICINE

## 2019-02-21 PROCEDURE — 27210794 ZZH OR GENERAL SUPPLY STERILE: Performed by: SURGERY

## 2019-02-21 PROCEDURE — 20000004 ZZH R&B ICU UMMC

## 2019-02-21 PROCEDURE — 71045 X-RAY EXAM CHEST 1 VIEW: CPT

## 2019-02-21 PROCEDURE — 37000008 ZZH ANESTHESIA TECHNICAL FEE, 1ST 30 MIN: Performed by: SURGERY

## 2019-02-21 PROCEDURE — 97110 THERAPEUTIC EXERCISES: CPT | Mod: GO | Performed by: OCCUPATIONAL THERAPIST

## 2019-02-21 RX ORDER — POTASSIUM CHLORIDE 1.5 G/1.58G
20 POWDER, FOR SOLUTION ORAL 2 TIMES DAILY
Status: DISCONTINUED | OUTPATIENT
Start: 2019-02-21 | End: 2019-02-21

## 2019-02-21 RX ORDER — POTASSIUM CHLORIDE 1.5 G/1.58G
20 POWDER, FOR SOLUTION ORAL DAILY
Status: DISCONTINUED | OUTPATIENT
Start: 2019-02-22 | End: 2019-02-24

## 2019-02-21 RX ORDER — CEFAZOLIN SODIUM 1 G/3ML
INJECTION, POWDER, FOR SOLUTION INTRAMUSCULAR; INTRAVENOUS CONTINUOUS PRN
Status: DISCONTINUED | OUTPATIENT
Start: 2019-02-21 | End: 2019-02-21 | Stop reason: HOSPADM

## 2019-02-21 RX ORDER — FENTANYL CITRATE 50 UG/ML
INJECTION, SOLUTION INTRAMUSCULAR; INTRAVENOUS PRN
Status: DISCONTINUED | OUTPATIENT
Start: 2019-02-21 | End: 2019-02-21

## 2019-02-21 RX ORDER — POTASSIUM CHLORIDE 1.5 G/1.58G
40 POWDER, FOR SOLUTION ORAL ONCE
Status: COMPLETED | OUTPATIENT
Start: 2019-02-21 | End: 2019-02-21

## 2019-02-21 RX ORDER — CEFAZOLIN SODIUM 1 G/3ML
1 INJECTION, POWDER, FOR SOLUTION INTRAMUSCULAR; INTRAVENOUS EVERY 8 HOURS
Status: COMPLETED | OUTPATIENT
Start: 2019-02-22 | End: 2019-02-23

## 2019-02-21 RX ORDER — CEFAZOLIN SODIUM 1 G/3ML
INJECTION, POWDER, FOR SOLUTION INTRAMUSCULAR; INTRAVENOUS PRN
Status: DISCONTINUED | OUTPATIENT
Start: 2019-02-21 | End: 2019-02-21

## 2019-02-21 RX ORDER — SODIUM CHLORIDE 9 MG/ML
INJECTION, SOLUTION INTRAVENOUS CONTINUOUS PRN
Status: DISCONTINUED | OUTPATIENT
Start: 2019-02-21 | End: 2019-02-21

## 2019-02-21 RX ORDER — DEXMEDETOMIDINE HYDROCHLORIDE 4 UG/ML
0.2-1.2 INJECTION, SOLUTION INTRAVENOUS CONTINUOUS
Status: DISCONTINUED | OUTPATIENT
Start: 2019-02-21 | End: 2019-02-21 | Stop reason: HOSPADM

## 2019-02-21 RX ORDER — CEFAZOLIN SODIUM 1 G/3ML
1 INJECTION, POWDER, FOR SOLUTION INTRAMUSCULAR; INTRAVENOUS EVERY 8 HOURS
Status: DISCONTINUED | OUTPATIENT
Start: 2019-02-22 | End: 2019-02-21

## 2019-02-21 RX ORDER — FUROSEMIDE 10 MG/ML
40 INJECTION INTRAMUSCULAR; INTRAVENOUS ONCE
Status: COMPLETED | OUTPATIENT
Start: 2019-02-21 | End: 2019-02-21

## 2019-02-21 RX ADMIN — FENTANYL CITRATE 25 MCG: 50 INJECTION, SOLUTION INTRAMUSCULAR; INTRAVENOUS at 10:19

## 2019-02-21 RX ADMIN — BENZOCAINE, MENTHOL 1 LOZENGE: 15; 3.6 LOZENGE ORAL at 03:22

## 2019-02-21 RX ADMIN — FENTANYL CITRATE 25 MCG: 50 INJECTION, SOLUTION INTRAMUSCULAR; INTRAVENOUS at 09:44

## 2019-02-21 RX ADMIN — MILRINONE LACTATE 0.3 MCG/KG/MIN: 200 INJECTION, SOLUTION INTRAVENOUS at 19:43

## 2019-02-21 RX ADMIN — FENTANYL CITRATE 25 MCG: 50 INJECTION, SOLUTION INTRAMUSCULAR; INTRAVENOUS at 09:37

## 2019-02-21 RX ADMIN — MIDAZOLAM 2 MG: 1 INJECTION INTRAMUSCULAR; INTRAVENOUS at 09:20

## 2019-02-21 RX ADMIN — FENTANYL CITRATE 25 MCG: 50 INJECTION, SOLUTION INTRAMUSCULAR; INTRAVENOUS at 11:00

## 2019-02-21 RX ADMIN — DEXMEDETOMIDINE 0.7 MCG/KG/HR: 100 INJECTION, SOLUTION, CONCENTRATE INTRAVENOUS at 09:37

## 2019-02-21 RX ADMIN — ACETAMINOPHEN 650 MG: 325 TABLET, FILM COATED ORAL at 19:43

## 2019-02-21 RX ADMIN — MESALAMINE 2400 MG: 800 TABLET, DELAYED RELEASE ORAL at 07:52

## 2019-02-21 RX ADMIN — Medication 15 MG/HR: at 19:32

## 2019-02-21 RX ADMIN — AMIODARONE HYDROCHLORIDE 200 MG: 200 TABLET ORAL at 07:51

## 2019-02-21 RX ADMIN — ACETAMINOPHEN 650 MG: 325 TABLET, FILM COATED ORAL at 13:39

## 2019-02-21 RX ADMIN — FENTANYL CITRATE 25 MCG: 50 INJECTION, SOLUTION INTRAMUSCULAR; INTRAVENOUS at 11:09

## 2019-02-21 RX ADMIN — SODIUM CHLORIDE: 9 INJECTION, SOLUTION INTRAVENOUS at 09:37

## 2019-02-21 RX ADMIN — MONTELUKAST SODIUM 10 MG: 10 TABLET, COATED ORAL at 21:43

## 2019-02-21 RX ADMIN — MILRINONE LACTATE 0.25 MCG/KG/MIN: 200 INJECTION, SOLUTION INTRAVENOUS at 03:05

## 2019-02-21 RX ADMIN — CEFAZOLIN 2 G: 1 INJECTION, POWDER, FOR SOLUTION INTRAMUSCULAR; INTRAVENOUS at 09:55

## 2019-02-21 RX ADMIN — MIDAZOLAM 1 MG: 1 INJECTION INTRAMUSCULAR; INTRAVENOUS at 10:23

## 2019-02-21 RX ADMIN — POTASSIUM CHLORIDE 20 MEQ: 1.5 POWDER, FOR SOLUTION ORAL at 22:33

## 2019-02-21 RX ADMIN — Medication 12.5 MG: at 07:53

## 2019-02-21 RX ADMIN — OMEPRAZOLE 40 MG: 20 CAPSULE, DELAYED RELEASE ORAL at 19:43

## 2019-02-21 RX ADMIN — LEVOTHYROXINE SODIUM 100 MCG: 100 TABLET ORAL at 07:51

## 2019-02-21 RX ADMIN — CEFAZOLIN 1 G: 1 INJECTION, POWDER, FOR SOLUTION INTRAMUSCULAR; INTRAVENOUS at 23:53

## 2019-02-21 RX ADMIN — FUROSEMIDE 40 MG: 10 INJECTION, SOLUTION INTRAVENOUS at 18:20

## 2019-02-21 RX ADMIN — Medication 1 MG: at 21:43

## 2019-02-21 RX ADMIN — ACETAMINOPHEN 650 MG: 325 TABLET, FILM COATED ORAL at 03:18

## 2019-02-21 RX ADMIN — MAGNESIUM SULFATE HEPTAHYDRATE 2 G: 40 INJECTION, SOLUTION INTRAVENOUS at 03:56

## 2019-02-21 RX ADMIN — BENZOCAINE, MENTHOL 1 LOZENGE: 15; 3.6 LOZENGE ORAL at 19:36

## 2019-02-21 RX ADMIN — POTASSIUM CHLORIDE 40 MEQ: 1.5 POWDER, FOR SOLUTION ORAL at 17:12

## 2019-02-21 RX ADMIN — FLUTICASONE FUROATE 1 PUFF: 200 POWDER RESPIRATORY (INHALATION) at 07:52

## 2019-02-21 RX ADMIN — POTASSIUM CHLORIDE 20 MEQ: 1.5 POWDER, FOR SOLUTION ORAL at 13:55

## 2019-02-21 RX ADMIN — OXYCODONE HYDROCHLORIDE 5 MG: 5 TABLET ORAL at 19:43

## 2019-02-21 RX ADMIN — DULOXETINE 20 MG: 20 CAPSULE, DELAYED RELEASE ORAL at 07:51

## 2019-02-21 RX ADMIN — BENZOCAINE, MENTHOL 1 LOZENGE: 15; 3.6 LOZENGE ORAL at 21:38

## 2019-02-21 RX ADMIN — TAMSULOSIN HYDROCHLORIDE 0.4 MG: 0.4 CAPSULE ORAL at 17:12

## 2019-02-21 RX ADMIN — MESALAMINE 2400 MG: 800 TABLET, DELAYED RELEASE ORAL at 19:40

## 2019-02-21 RX ADMIN — POTASSIUM CHLORIDE 20 MEQ: 1.5 POWDER, FOR SOLUTION ORAL at 03:57

## 2019-02-21 ASSESSMENT — ACTIVITIES OF DAILY LIVING (ADL)
ADLS_ACUITY_SCORE: 13
IADL_COMMENTS: FAMILY ABLE TO ASSIST WITH IADLS PRN
ADLS_ACUITY_SCORE: 13
PREVIOUS_RESPONSIBILITIES: MEAL PREP;HOUSEKEEPING;LAUNDRY;SHOPPING;DRIVING
ADLS_ACUITY_SCORE: 13

## 2019-02-21 ASSESSMENT — PAIN DESCRIPTION - DESCRIPTORS: DESCRIPTORS: DISCOMFORT

## 2019-02-21 NOTE — ANESTHESIA CARE TRANSFER NOTE
Patient: Everton Larios    Procedure(s):  SUBCLAVIAN BALLOON PUMP PLACEMENT    Diagnosis: Heart failure  Diagnosis Additional Information: No value filed.    Anesthesia Type:   No value filed.     Note:  Airway :Room Air  Patient transferred to:ICU  Comments: Pt transported fully monitored to ICU. Pt alert, breathing spontaneously on RA. VSS. Report shared with RN at bedside.ICU Handoff: Call for PAUSE to initiate/utilize ICU HANDOFF, Identified Patient, Identified Responsible Provider, Reviewed the Pertinent Medical History, Discussed Surgical Course, Reviewed Intra-OP Anesthesia Management and Issues during Anesthesia, Set Expectations for Post Procedure Period and Allowed Opportunity for Questions and Acknowledgement of Understanding      Vitals: (Last set prior to Anesthesia Care Transfer)    CRNA VITALS  2/21/2019 1137 - 2/21/2019 1213      2/21/2019             ART BP:  88/51  (Abnormal)     ART Mean:  68    Ht Rate:  60    SpO2:  95 %                Electronically Signed By: TAI Bird CRNA  February 21, 2019  12:13 PM

## 2019-02-21 NOTE — ANESTHESIA PREPROCEDURE EVALUATION
Anesthesia Pre-Procedure Evaluation    Patient: Everton Larios   MRN:     [id number] Gender:   male   Age:    53 year old :      1965        Preoperative Diagnosis: Heart failure   Procedure(s):  SUBCLAVIAN BALLOON PUMP PLACEMENT     Past Medical History:   Diagnosis Date     Alcohol abuse      Pierce's esophagus 10/4/2018     Chronic rhinitis 10/4/2018     Chronic systolic heart failure (H) 10/4/2018     Depression 10/4/2018     Diastolic dysfunction      DJD (degenerative joint disease) - neck 10/4/2018     H/O congenital atrial septal defect (ASD) repair at age 3 10/4/2018     Hypothyroidism due to medication 10/4/2018     ICD, [employer] ; gen change 2018 10/4/2018     Intermittent asthma without complication 10/4/2018     Nonischemic cardiomyopathy (H) 10/4/2018     On amiodarone therapy 10/4/2018     Paroxysmal atrial fibrillation (H) 10/4/2018     S/P ablation of atrial fibrillation 10/4/2018     Systolic heart failure (H)      Ulcerative colitis (H) 10/4/2018     Ventricular tachycardia (H) 10/4/2018      Past Surgical History:   Procedure Laterality Date     AICD, DUAL CHAMBER       INSERT INTRAAORTIC BALLOON PUMP Left 2019    Procedure: Insert left  Subclavian Balloon Pump,  Removal Right femoral arterial balloom pump sheath;  Surgeon: Dewayne House MD;  Location: [facility] OR          Anesthesia Evaluation     .             ROS/MED HX    ENT/Pulmonary:       Neurologic:       Cardiovascular: Comment: NICM with decompensated CHF now on milrinone and s/p placement and removal of subclavian IABP     (+) ----. : . CHF etiology: NICM Last EF: 10-20 date: 2018 . MATA, . :ICD . . Previous cardiac testing Echodate:2019results:Severely (EF 10-20%) reduced left ventricular function is present. LVIDd: 6.3  cm. Severe diffuse hypokinesis is present. Biplane EF is 17%. LV apical  hypertrabeculation noted with no evidence of thrombus.  Grade III or advanced diastolic  dysfunction.  Global right ventricular function is moderately reduced.  The right ventricle is normal size.There is moderate right ventricular  hypertrophy.  Severe biatrial enlargement is present.  Mild mitral insufficiency is present.  No pericardial effusion is present.  Previous study not available for comparison.    date: results: date: results: date: results:          METS/Exercise Tolerance:     Hematologic:         Musculoskeletal:         GI/Hepatic: Comment: PUD s/p GIB   Ulcerative colitis     (+) GERD Asymptomatic on medication,       Renal/Genitourinary:     (+) BPH,       Endo:     (+) thyroid problem hypothyroidism, .      Psychiatric:         Infectious Disease:         Malignancy:         Other:                         PHYSICAL EXAM:   Mental Status/Neuro:    Airway:   Mallampati: II  Mouth/Opening: Full  TM distance: > 6 cm  Neck ROM: Full   Respiratory:   Resp. Rate: Normal     Resp. Effort: Normal      CV: Rhythm: Irregular  Heart: Normal Sounds   Comments:      Dental:  Dental Comments: Missing one tooth                Lab Results   Component Value Date    WBC 7.7 02/21/2019    HGB 12.7 (L) 02/21/2019    HCT 38.8 (L) 02/21/2019     (L) 02/21/2019    CRP 6.1 11/15/2018     02/21/2019    POTASSIUM 3.9 02/21/2019    CHLORIDE 99 02/21/2019    CO2 29 02/21/2019    BUN 19 02/21/2019    CR 1.04 02/21/2019     (H) 02/21/2019    RADAMES 8.5 02/21/2019    PHOS 4.1 02/21/2019    MAG 1.9 02/21/2019    ALBUMIN 3.5 02/17/2019    PROTTOTAL 6.8 02/17/2019    ALT 20 02/17/2019    AST 19 02/17/2019    ALKPHOS 146 02/17/2019    BILITOTAL 2.0 (H) 02/17/2019    PTT 36 02/17/2019    INR 1.33 (H) 02/21/2019    TSH 10.41 (H) 01/02/2019    T4 1.32 01/02/2019       Preop Vitals  BP Readings from Last 3 Encounters:   02/21/19 98/71   01/30/19 99/74   01/14/19 100/66    Pulse Readings from Last 3 Encounters:   02/21/19 68   01/30/19 77   01/14/19 85      Resp Readings from Last 3 Encounters:   02/21/19 18  "  11/28/18 18   11/23/18 16    SpO2 Readings from Last 3 Encounters:   02/21/19 97%   01/30/19 98%   01/14/19 94%      Temp Readings from Last 1 Encounters:   02/21/19 35.8  C (96.4  F) (Axillary)    Ht Readings from Last 1 Encounters:   02/17/19 1.727 m (5' 8\")      Wt Readings from Last 1 Encounters:   02/19/19 59.3 kg (130 lb 12.8 oz)    Estimated body mass index is 19.89 kg/m  as calculated from the following:    Height as of this encounter: 1.727 m (5' 8\").    Weight as of this encounter: 59.3 kg (130 lb 12.8 oz).     LDA:  PICC Single Lumen 11/19/18 Right Basilic (Active)   Site Assessment WDL 2/21/2019  4:00 AM   Line Status Infusing 2/21/2019  4:00 AM   Extravasation? No 2/21/2019  4:00 AM   Dressing Intervention Transparent 2/21/2019  4:00 AM   Dressing Change Due 02/24/19 2/21/2019  4:00 AM   PICC Comment 2/24/2019 2/21/2019  4:00 AM   Number of days: 94       CVC Double Lumen 02/17/19 Right Internal jugular (Active)   Site Assessment WDL 2/21/2019  4:00 AM   Dressing Intervention Transparent 2/21/2019  4:00 AM   Dressing Change Due 02/26/19 2/21/2019  4:00 AM   CVC Comment abram 2/21/2019  4:00 AM   Lumen A - Color BROWN 2/21/2019  4:00 AM   Lumen A - Status saline locked 2/21/2019  4:00 AM   Lumen A - Cap Change Due 02/22/19 2/21/2019  4:00 AM   Lumen B - Color WHITE 2/21/2019  4:00 AM   Lumen B - Status infusing 2/21/2019  4:00 AM   Lumen B - Cap Change Due 02/22/19 2/21/2019  4:00 AM   Extravasation? No 2/21/2019  4:00 AM   Number of days: 4       Pulmonary Artery Catheter - Quad Lumen 02/17/19 1500 Right internal jugular vein monitoring catheter (Active)   Dressing dressing dry and intact 2/21/2019  4:00 AM   Securement secured with sutures 2/21/2019  4:00 AM   PA Catheter Markings (cm) 56 2/21/2019  4:00 AM   Phlebitis 0-->no symptoms 2/21/2019  4:00 AM   Infiltration 0-->no symptoms 2/21/2019  4:00 AM   Waveform normal 2/19/2019  1:00 AM   Lumen A - Color BLUE 2/21/2019  4:00 AM   Lumen A - Status " infusing 2/21/2019  4:00 AM   Lumen A - Cap Change Due 02/22/19 2/21/2019  4:00 AM   Lumen B - Color WHITE 2/21/2019  4:00 AM   Lumen B - Status saline locked 2/21/2019  4:00 AM   Lumen B - Cap Change Due 02/22/19 2/21/2019  4:00 AM   Lumen C - Color OTHER 2/21/2019  4:00 AM   Lumen C - Status infusing 2/21/2019  4:00 AM   Lumen C - Cap Change Due 02/22/19 2/21/2019  4:00 AM   Number of days: 4            Assessment:   ASA SCORE: 4       Documentation: H&P complete; Preop Testing complete; Consents complete   Proceeding: Proceed without further delay  Tobacco Use:  NO Active use of Tobacco/UNKNOWN Tobacco use status     Plan:   Anes. Type:  MAC   Pre-Induction: Midazolam IV   Induction:  Not applicable   Airway: Native Airway   Access/Monitoring: PIV; CVL/Port present   Maintenance: Dexmedetomidine   Emergence: N/a   Logistics: ICU Admission     Postop Pain/Sedation Strategy:  Standard-Options: Opioids PRN     PONV Management:  Adult Risk Factors:, Non-Smoker, Postop Opioids     CONSENT: Direct conversation   Plan and risks discussed with: Patient   Blood Products: Consent Deferred (Minimal Blood Loss)                         Bronson Rainey MD

## 2019-02-21 NOTE — PLAN OF CARE
OT/CR 4E: Evaluation complete and treatment initiated.  Discharge Planner OT   Patient plan for discharge: Pt plans to discharge to his parents home pending outcomes of surgical intervention  Current status: Pt is SBA for bed mobility and sit to stand transfers - min vc due to decreased line awareness.  Pt CGA to ambulate approx 350ft with no UE support - intermittent min A to correct losses of balance; however, likely medication related due to earlier procedure.  Will continue to assess.  VSS on RA  Barriers to return to prior living situation: awaiting LVAD vs heart transplant, deconditioning  Recommendations for discharge: currently would recommend discharge to home with OPCR - will update pending medical course  Rationale for recommendations: Pt will benefit from continued skilled therapy intervention while inpatient to progress strength and activity tolerance to maximize functional outcomes post surgically         Entered by: Polina Horton 02/21/2019 4:22 PM

## 2019-02-21 NOTE — PLAN OF CARE
PT evaluation cx and on hold.  OT following patient and addressing ambulation, OT to advise if additional PT intervention indicated to address balance/gait.

## 2019-02-21 NOTE — PLAN OF CARE
Hx: Hf, Afib, cardiomyopathy - Admitted on the 17th for Hf, EF 15%, status 2, and waiting for heart transplant/LVAD    Neuro: Ox4 and PERRLA    Respiratory: RA and clear bilateral lung sounds    Cardiac: Paced 100% - CVP 20, PA 54/26, SvO2 49 - Ballon Pump 1:1, 100% augmentation - replace subclavian balloon pump due to clot - FICCs q 6    GI/Bowel: last BM 2/18    Gu: Dysuria but UO getting better    Drips: Lasix @ 5, Heparin @1000 [not therapeutic], Milrinone @ 0.25    Labs: Electrolytes good - heparin not therapeutic [redraw at 1830]    Drains: NA    Skin: no issues    Plan: Waiting for heart transplant/LVAD

## 2019-02-21 NOTE — PLAN OF CARE
Afebrile, neuro status intact, c/o pains to neck, L side and R groin, relieved with PRN tylenol and 5mg Oxy. Prefers 2L NC while sleeping for comfort, sats 90s, LS clear and diminished bilaterally.  Continues to be 100% paced at 70 with perm pacer/ICD, pulses palpable, MAP >65.  CVP 16, PA 56-58/26-30, SvO2 50-57, CO 2.5-3.2, CI 1.5-1.9 and SVR 5548-8782, notified MD to decreasing CI to 1.5, orders to titrate up Milrinone gtt to 0.3 straight rate.  IABP remains out, plans for potential subclavian IABP replacement today in OR, unknown time currently.  Remains on Heparin gtt at 1000units/hr, therapeutic this AM, awaiting orders to turn off with scheduled OR time. Placed on NPO at 0600, (unofficially NPO since 0300 per pt).  Lasix gtt continues at 10, UOP 100ml/hr. Electrolytes replaced.  Will continue to update MD with any changes in condition per protocol.

## 2019-02-21 NOTE — PROCEDURES
"    PROCEDURES PERFORMED:   1. Removal of a left subclavian IABP.  2. Unsuccessful replacement of left subclavian IABP.    PHYSICIANS:  1. Attending Interventional Cardiology Staff: Yves Rodrigues MD  2. Interventional Cardiology Fellow: Weston Mcdaniel MD     INDICATION:  Everton Larios is a 53 year old male with history of NICM with cardiogenic shock s/p subclavian IABP yesterday which is no longer functioning properly who presents for replacement of IABP.    DESCRIPTION:  1. Consent obtained with discussion of risks.  All questions were answered.  2. Sterile prep and procedure.  3. Location: right subclavian artery  4. Access: Local anesthetic with lidocaine.    5. Fluoroscopy time of 7.8 min.  6. Estimated blood loss of <5 mL.  7. See below for procedure details.    MEDICATIONS:  1. Contrast 0 mL IV.  2. Conscious sedation with fentanyl 100 mcg and midazolam 2 mg.    SEDATION START TIME: 1805   SEDATION END TIME: 1846  TOTAL SEDATION TIME: 41 min   Heart rate, BP, respiration, oxygen saturation and patient responses were monitored throughout the procedure with the assistance of the RN under my supervision.    IABP removal and attempted replacement:  The upper left chest was cleaned with chlorhexidine. The IABP was disconnected and the air aspirated. A 260 cm 0.025\" Platinum Plus wire was advanced through the left subclavian IABP to the distal aorta. Over this wire, the subclavian IABP was removed without difficulty. Next a new 50 mL IABP was advanced over the platinum plus wire. The distal edge of the balloon was advanced to the junction of the left subclavian and aortic arch but would not advance beyond this point as the IABP would bend and kink at the site of the subclavian graft entry point. The IABP and wire were then removed without difficulty.    COMPLICATIONS:  1. None    SUMMARY:   1. NICM with cardiogenic shock.  2. Left subclavian artery IABP placed 2/19/19 which is no longer functioning.  3. " Successful removal of left subclavian IABP.  4. Unsuccessful placement of new left subclavian IABP. Spoke with Dr. Beck who did not want a femoral IABP.     PLAN:   1. Return to the inpatient team for further management of cardiogenic shock.    The attending interventional cardiologist was present for the entire procedure.    Findings discussed with the inpatient cardiology attending Dr. Beck.    See CVIS report for final draft.      Weston Mcdaniel MD  Interventional Cardiology Fellow      I was present for the entire procedure.     Yves Rodrigues M.D.  Interventional Cardiology  [facility]  Pager: [phone]

## 2019-02-21 NOTE — PROGRESS NOTES
" 02/21/19 1600   Quick Adds   Type of Visit Initial Occupational Therapy Evaluation   Living Environment   Lives With alone   Living Arrangements ([facility])   Home Accessibility no concerns   Transportation Anticipated car, drives self   Living Environment Comment Prior to admission pt lived alone in a 1 level townhome; no stairs to access;  Pt reports he intends to discharge to his parents home when the time comes and states they live in a 2 story home with 5 stairs to enter; bedroom on upper level, bathroom on main and upper levels   Self-Care   Usual Activity Tolerance good   Current Activity Tolerance moderate   Regular Exercise No   Equipment Currently Used at Home none   Activity/Exercise/Self-Care Comment pt was previously independent with all basic mobility and self cares.  pt recently quit his job working as a  at [employer] due to increased fatigue.  Pt has experienced intermittent SOB with longer distance ambulation (> 1 block) and reports that recently he has \"avoided having to do stairs\"   Functional Level   Ambulation 0-->independent   Transferring 0-->independent   Toileting 0-->independent   Bathing 0-->independent   Dressing 0-->independent   Eating 0-->independent   Cognition 0 - no cognition issues reported   Fall history within last six months no   Which of the above functional risks had a recent onset or change? ambulation;transferring;bathing       Present no   Language english   General Information   Onset of Illness/Injury or Date of Surgery - Date 02/17/19   Referring Physician Constantin Rockwell MD   Patient/Family Goals Statement Get a heart and go home   Additional Occupational Profile Info/Pertinent History of Current Problem Everton Larios is a 52 yo gentleman with a h/o NICM, HFrEF (EF 15%) s/p ICD placement (2008, generator change 2018), congenital ASD repair in childhood, paroxysmal atrial fibrillation s/p ablation and cardioversion, hypothyroidism, EtOH abuse, " ulcerative colitis, remote history of GI bleed with splenic embolization, and a recent admission in 11/2018 for ambulatory cardiogenic shock requiring initiation of IV inotropes, who was admitted for LVAD placement vs heart transplantation, now with subclavian IABP and listed as status 2   Precautions/Limitations fall precautions   Heart Disease Risk Factors Medical history;Gender   General Observations Pt pleasant and agreeable; recently returned from IABP placement and medication in process of wearing off but appropriately engaged   General Info Comments Activity: ambulate with assist - RT and RN needed for line monitoring   Cognitive Status Examination   Orientation orientation to person, place and time   Level of Consciousness alert   Follows Commands (Cognition) 75-90% accuracy   Memory intact   Attention Distractible during evaluation   Cognitive Comment Pt alert, oriented and generally appropriate in conversation; at times requires instructions repeated or will move abruptly though pt with sedation still wearing off an likely impacting pt attention; will monitor   Visual Perception   Visual Perception Comments pt denies acute visual changes   Sensory Examination   Sensory Quick Adds No deficits were identified   Pain Assessment   Patient Currently in Pain No   Integumentary/Edema   Integumentary/Edema no deficits were identifed   Range of Motion (ROM)   ROM Comment BUE/BLE WFL; modified ROM on LUE to shoulder flexion/abduction 60% with sublavian IABP in place   Strength   Strength Comments strength grossly 5/5 in all extremities; though generally deconditions and fatigues with activity   Mobility   Bed Mobility Bed mobility skill: Supine to sit   Bed Mobility Skill: Supine to Sit   Level of [place]: Supine/Sit stand-by assist   Transfer Skill: Bed to Chair/Chair to Bed   Level of [place]: Bed to Chair contact guard   Transfer Skill: Sit to Stand   Level of [place]: Sit/Stand contact guard    Balance   Balance Comments impaired at time of session; LOB x 3 during ambulation with head turns and changes in direction; likely medication related; will continue to address   Instrumental Activities of Daily Living (IADL)   Previous Responsibilities meal prep;housekeeping;laundry;shopping;driving   IADL Comments family able to assist with IADls prn   Activities of Daily Living Analysis   Impairments Contributing to Impaired Activities of Daily Living balance impaired;cognition impaired;ROM decreased;strength decreased   General Therapy Interventions   Planned Therapy Interventions ADL retraining;IADL retraining;cognition;strengthening;transfer training;home program guidelines;progressive activity/exercise;risk factor education   Clinical Impression   Criteria for Skilled Therapeutic Interventions Met yes, treatment indicated   OT Diagnosis decreased activity tolerance and independence with ADLs   Influenced by the following impairments deconditioning, impaired balance, below baseline strength, impaired cardiac function   Assessment of Occupational Performance 3-5 Performance Deficits   Identified Performance Deficits bathing, toileting, ambulation, home management   Clinical Decision Making (Complexity) Low complexity   Therapy Frequency (4x/week)   Predicted Duration of Therapy Intervention (days/wks) 3/6/19   Anticipated Discharge Disposition Home with Assist;Home with Outpatient Therapy   Risks and Benefits of Treatment have been explained. Yes   Patient, Family & other staff in agreement with plan of care Yes   Clinical Impression Comments based on pt current level of function could discharge to home with OP CR; however, will update based on pt functional status post op as pt awaiting LVAD vs heart transplant   Total Evaluation Time   Total Evaluation Time (Minutes) 5

## 2019-02-21 NOTE — PROGRESS NOTES
Cardiology Progress Note  Everton Larios MRN: [id number]  Age: 53 year old, : 1965  02/20/19            Changes Today:   - surgical deployment new subclavian IABP        Assessment and Plan:     Everton Larios is a 52 yo gentleman with a h/o NICM, HFrEF (EF 15%) s/p ICD placement (, generator change ), congenital ASD repair in childhood, paroxysmal atrial fibrillation s/p ablation and cardioversion, hypothyroidism, EtOH abuse, ulcerative colitis, remote history of GI bleed with splenic embolization, and a recent admission in 2018 for ambulatory cardiogenic shock requiring initiation of IV inotropes, who was admitted for LVAD placement vs heart transplantation.     # Chronic decompensated systolic heart failure s/p L subclavin balloon pump ()  # Ambulatory cardiogenic shock, inotrope dependent  # NICM s/p ICD  # H/o congenital ASD repair  NYHA class: IV  AHA stage: D  Etiology: NICM  - currently status 2 for heart transplant  - continue milrinone at 0.25 mcg/kg/min  - intermittent lasix to maintain good filling pressures  - continue spironolactone 12.5 mg daily  - continue to monitor [employer] numbers Q6H  - electrolyte protocol ordered, held PTA KCl     # Paroxysmal Afib s/p ablation  - continue low dose heparin gtt while awaiting transplant  - continue PTA amiodarone 200 mg daily  - no AV lily blocking agents     # Hypothyroidism: continue PTA levothyroxine  # Ulcerative colitis: currently in remission, continue PTA mesalamine  # GERD: continue PTA omeprazole  # BPH: continue PTA tamsulosin  # Depression: continue PTA duloxetine  # H/o EtOH abuse: currently denies any EtOH since 4 months ago    # New pulmonary nodules  Multiple scattered <4 mm calcified and noncalcified nodules found incidentally on CT chest on , apparently was not identified on prior CT imaging. He does have a 25 pack year smoking history, quit in . Given the intermediate risk for  malignancy from significant smoking history, will need repeat chest CT in 1 year per Fleischner criteria to follow up nodules for any growth or transformation.  - will need repeat chest CT in 1 year to f/u pulmonary nodules    FEN: 2g Na, 2L fluid restriction  Ppx: heparin gtt  LDA: PIV, R PICC, RIJ PA catheter, subclavian IABP  Dispo: Pending potential heart transplant this admission, currently status 2     CODE: Full     Patient was seen and discussed with Dr. Beck, who agrees with the assessment and plan.    Constantin Rockwell MD PGY5  Cardiology Fellow          Subjective     RN notes reviewed, NAEO. He denies any SOB, chest pain, or abdominal pain. Frustrated IABP stopped working and cathlab could not fix it.          Objective     Vitals:  Temp:  [96.4  F (35.8  C)-97.8  F (36.6  C)] 97.8  F (36.6  C)  Pulse:  [61-86] 67  Heart Rate:  [59-88] 67  Resp:  [16-18] 16  BP: ()/(38-97) 81/59  MAP:  [72 mmHg-120 mmHg] 120 mmHg  Arterial Line BP: ()/() 161/103  SpO2:  [88 %-97 %] 96 %    Vitals:    02/17/19 1330 02/18/19 0500   Weight: 63.5 kg (140 lb 1.6 oz) 60.3 kg (133 lb)     General:  Alert, sitting up in bed, no acute distress.  HEENT: Atraumatic, anicteric sclera, extraocular motion intact, nares dry, mucus membranes moist.  Chest: Subclavian IABP  CV: Normal rate, regular rhythm. S1, prominent S2.  No murmurs, rubs or gallops. JVD not seen.  Resp: Clear to auscultation bilaterally, no accessory muscle use.  Abdomen: Bowel sounds +, soft, nontender, no hepatosplenomegaly, no masses.  Extremities: No peripheral edema, warm and well perfused.  Skin: No rashes, bruising, or jaundice.  Neuro: Alert, oriented x 3, symmetric facial expressions, moving extremities equally  Psych: Pleasant affect.          Data:     Labs reviewed in Epic       Medications     Medications    amiodarone  200 mg Oral Daily     DULoxetine  20 mg Oral Daily     fluticasone  1 puff Inhalation Daily     levothyroxine  100  mcg Oral Daily     mesalamine  2,400 mg Oral BID     montelukast  10 mg Oral At Bedtime     omeprazole  40 mg Oral QPM     sodium chloride (PF)  3 mL Intracatheter Q8H     spironolactone  12.5 mg Oral Daily     tamsulosin  0.4 mg Oral Daily with supper       EPINEPHrine IV infusion ADULT       furosemide 10 mg/hr (02/21/19 1700)     HEParin Stopped (02/21/19 8210)     - MEDICATION INSTRUCTIONS -       milrinone 0.3 mcg/kg/min (02/21/19 1700)     norepinephrine       - MEDICATION INSTRUCTIONS -       Critical Care ICU Note - Cardiology  Rosendo Beck M.D.    Impression:    Cardiogenic shock  Acute and chronic congestive heart failure        The patient remains unstable in the ICU with on-going need for, parenteral medications for the adjustment of blood pressure and cardiac output and maintenance of renal function.      The patient is seen for pr shock requiring vasopressor and or inotropic agents; low cardiac output necessitating  inotropic agents, vasopressors and afterload reducing agents; limited mechanical support requiring IABP;   I personally reviewed:    Arterial and venous blood gases to assess acid base balance, oxygenation, and ventilator settings.      Hemodynamic parameters obtained by central hemodynamic monitoring including RAP, estimated LVEDP, pulmonary artery pressure, cardiac output and vascular resistances in order to adjust fluids and infused medications for blood pressure and cardiac output maintenance.      Volume status, renal function and nutritional support as judged by intake, output, daily weight and appropriate laboratories.    I reviewed individual and serial imaging studies including echocardiogram, chest x-ray, CT scan    I personally supervised the prescription of parenteral fluids, inotropes, vasodilators , and vasopressors in order to correct cardiac output, maintain urine output and renal function.    60 minutes    I agree with fellows note above

## 2019-02-22 ENCOUNTER — DOCUMENTATION ONLY (OUTPATIENT)
Dept: TRANSPLANT | Facility: CLINIC | Age: 54
End: 2019-02-22

## 2019-02-22 ENCOUNTER — APPOINTMENT (OUTPATIENT)
Dept: GENERAL RADIOLOGY | Facility: CLINIC | Age: 54
DRG: 001 | End: 2019-02-22
Attending: STUDENT IN AN ORGANIZED HEALTH CARE EDUCATION/TRAINING PROGRAM
Payer: COMMERCIAL

## 2019-02-22 LAB
ANION GAP SERPL CALCULATED.3IONS-SCNC: 8 MMOL/L (ref 3–14)
ANION GAP SERPL CALCULATED.3IONS-SCNC: 9 MMOL/L (ref 3–14)
BASE EXCESS BLDV CALC-SCNC: 5.5 MMOL/L
BASE EXCESS BLDV CALC-SCNC: 6.4 MMOL/L
BASE EXCESS BLDV CALC-SCNC: 7.5 MMOL/L
BASE EXCESS BLDV CALC-SCNC: 7.7 MMOL/L
BUN SERPL-MCNC: 17 MG/DL (ref 7–30)
BUN SERPL-MCNC: 18 MG/DL (ref 7–30)
CALCIUM SERPL-MCNC: 7.7 MG/DL (ref 8.5–10.1)
CALCIUM SERPL-MCNC: 8.8 MG/DL (ref 8.5–10.1)
CHLORIDE SERPL-SCNC: 96 MMOL/L (ref 94–109)
CHLORIDE SERPL-SCNC: 99 MMOL/L (ref 94–109)
CO2 SERPL-SCNC: 28 MMOL/L (ref 20–32)
CO2 SERPL-SCNC: 29 MMOL/L (ref 20–32)
CREAT SERPL-MCNC: 0.87 MG/DL (ref 0.66–1.25)
CREAT SERPL-MCNC: 0.96 MG/DL (ref 0.66–1.25)
ERYTHROCYTE [DISTWIDTH] IN BLOOD BY AUTOMATED COUNT: 13.4 % (ref 10–15)
GFR SERPL CREATININE-BSD FRML MDRD: 88 ML/MIN/{1.73_M2}
GFR SERPL CREATININE-BSD FRML MDRD: >90 ML/MIN/{1.73_M2}
GLUCOSE SERPL-MCNC: 86 MG/DL (ref 70–99)
GLUCOSE SERPL-MCNC: 99 MG/DL (ref 70–99)
HCO3 BLDV-SCNC: 31 MMOL/L (ref 21–28)
HCO3 BLDV-SCNC: 32 MMOL/L (ref 21–28)
HCO3 BLDV-SCNC: 33 MMOL/L (ref 21–28)
HCO3 BLDV-SCNC: 34 MMOL/L (ref 21–28)
HCT VFR BLD AUTO: 37.3 % (ref 40–53)
HGB BLD-MCNC: 12.1 G/DL (ref 13.3–17.7)
LMWH PPP CHRO-ACNC: <0.1 IU/ML
MAGNESIUM SERPL-MCNC: 2 MG/DL (ref 1.6–2.3)
MAGNESIUM SERPL-MCNC: 2.2 MG/DL (ref 1.6–2.3)
MCH RBC QN AUTO: 30 PG (ref 26.5–33)
MCHC RBC AUTO-ENTMCNC: 32.4 G/DL (ref 31.5–36.5)
MCV RBC AUTO: 93 FL (ref 78–100)
O2/TOTAL GAS SETTING VFR VENT: 21 %
O2/TOTAL GAS SETTING VFR VENT: ABNORMAL %
OXYHGB MFR BLDV: 52 %
OXYHGB MFR BLDV: 53 %
OXYHGB MFR BLDV: 60 %
OXYHGB MFR BLDV: 62 %
PCO2 BLDV: 46 MM HG (ref 40–50)
PCO2 BLDV: 48 MM HG (ref 40–50)
PCO2 BLDV: 49 MM HG (ref 40–50)
PCO2 BLDV: 52 MM HG (ref 40–50)
PH BLDV: 7.42 PH (ref 7.32–7.43)
PH BLDV: 7.42 PH (ref 7.32–7.43)
PH BLDV: 7.43 PH (ref 7.32–7.43)
PH BLDV: 7.45 PH (ref 7.32–7.43)
PLATELET # BLD AUTO: 105 10E9/L (ref 150–450)
PO2 BLDV: 30 MM HG (ref 25–47)
PO2 BLDV: 30 MM HG (ref 25–47)
PO2 BLDV: 32 MM HG (ref 25–47)
PO2 BLDV: 34 MM HG (ref 25–47)
POTASSIUM SERPL-SCNC: 3.4 MMOL/L (ref 3.4–5.3)
POTASSIUM SERPL-SCNC: 4 MMOL/L (ref 3.4–5.3)
POTASSIUM SERPL-SCNC: 4 MMOL/L (ref 3.4–5.3)
RBC # BLD AUTO: 4.03 10E12/L (ref 4.4–5.9)
RESULT: NORMAL
SEND OUTS MISC TEST CODE: NORMAL
SEND OUTS MISC TEST SPECIMEN: NORMAL
SODIUM SERPL-SCNC: 134 MMOL/L (ref 133–144)
SODIUM SERPL-SCNC: 135 MMOL/L (ref 133–144)
T PALLIDUM AB SER QL: NONREACTIVE
TEST NAME: NORMAL
WBC # BLD AUTO: 7.4 10E9/L (ref 4–11)

## 2019-02-22 PROCEDURE — 83735 ASSAY OF MAGNESIUM: CPT | Performed by: STUDENT IN AN ORGANIZED HEALTH CARE EDUCATION/TRAINING PROGRAM

## 2019-02-22 PROCEDURE — 25000128 H RX IP 250 OP 636: Performed by: INTERNAL MEDICINE

## 2019-02-22 PROCEDURE — 86480 TB TEST CELL IMMUN MEASURE: CPT | Performed by: INTERNAL MEDICINE

## 2019-02-22 PROCEDURE — 85520 HEPARIN ASSAY: CPT | Performed by: STUDENT IN AN ORGANIZED HEALTH CARE EDUCATION/TRAINING PROGRAM

## 2019-02-22 PROCEDURE — 25000128 H RX IP 250 OP 636: Performed by: PHYSICIAN ASSISTANT

## 2019-02-22 PROCEDURE — 84132 ASSAY OF SERUM POTASSIUM: CPT | Performed by: INTERNAL MEDICINE

## 2019-02-22 PROCEDURE — 71045 X-RAY EXAM CHEST 1 VIEW: CPT

## 2019-02-22 PROCEDURE — 82805 BLOOD GASES W/O2 SATURATION: CPT | Performed by: STUDENT IN AN ORGANIZED HEALTH CARE EDUCATION/TRAINING PROGRAM

## 2019-02-22 PROCEDURE — 40000196 ZZH STATISTIC RAPCV CVP MONITORING

## 2019-02-22 PROCEDURE — 86777 TOXOPLASMA ANTIBODY: CPT | Performed by: INTERNAL MEDICINE

## 2019-02-22 PROCEDURE — 40000048 ZZH STATISTIC DAILY SWAN MONITORING

## 2019-02-22 PROCEDURE — 85027 COMPLETE CBC AUTOMATED: CPT | Performed by: STUDENT IN AN ORGANIZED HEALTH CARE EDUCATION/TRAINING PROGRAM

## 2019-02-22 PROCEDURE — 80048 BASIC METABOLIC PNL TOTAL CA: CPT | Performed by: STUDENT IN AN ORGANIZED HEALTH CARE EDUCATION/TRAINING PROGRAM

## 2019-02-22 PROCEDURE — 25000132 ZZH RX MED GY IP 250 OP 250 PS 637: Performed by: STUDENT IN AN ORGANIZED HEALTH CARE EDUCATION/TRAINING PROGRAM

## 2019-02-22 PROCEDURE — 25000132 ZZH RX MED GY IP 250 OP 250 PS 637: Performed by: INTERNAL MEDICINE

## 2019-02-22 PROCEDURE — 0064U ANTB TP TOTAL&RPR IA QUAL: CPT | Performed by: INTERNAL MEDICINE

## 2019-02-22 PROCEDURE — 25000128 H RX IP 250 OP 636: Performed by: STUDENT IN AN ORGANIZED HEALTH CARE EDUCATION/TRAINING PROGRAM

## 2019-02-22 PROCEDURE — 82805 BLOOD GASES W/O2 SATURATION: CPT | Performed by: INTERNAL MEDICINE

## 2019-02-22 PROCEDURE — 99233 SBSQ HOSP IP/OBS HIGH 50: CPT | Mod: GC | Performed by: INTERNAL MEDICINE

## 2019-02-22 PROCEDURE — 20000004 ZZH R&B ICU UMMC

## 2019-02-22 RX ORDER — TAMSULOSIN HYDROCHLORIDE 0.4 MG/1
0.8 CAPSULE ORAL
Status: DISCONTINUED | OUTPATIENT
Start: 2019-02-22 | End: 2019-02-24

## 2019-02-22 RX ADMIN — MONTELUKAST SODIUM 10 MG: 10 TABLET, COATED ORAL at 21:48

## 2019-02-22 RX ADMIN — OMEPRAZOLE 40 MG: 20 CAPSULE, DELAYED RELEASE ORAL at 20:17

## 2019-02-22 RX ADMIN — AMIODARONE HYDROCHLORIDE 200 MG: 200 TABLET ORAL at 08:56

## 2019-02-22 RX ADMIN — CEFAZOLIN 1 G: 1 INJECTION, POWDER, FOR SOLUTION INTRAMUSCULAR; INTRAVENOUS at 23:24

## 2019-02-22 RX ADMIN — MAGNESIUM SULFATE HEPTAHYDRATE 2 G: 40 INJECTION, SOLUTION INTRAVENOUS at 05:31

## 2019-02-22 RX ADMIN — POTASSIUM CHLORIDE 20 MEQ: 1.5 POWDER, FOR SOLUTION ORAL at 23:24

## 2019-02-22 RX ADMIN — ACETAMINOPHEN 650 MG: 325 TABLET, FILM COATED ORAL at 13:14

## 2019-02-22 RX ADMIN — CEFAZOLIN 1 G: 1 INJECTION, POWDER, FOR SOLUTION INTRAMUSCULAR; INTRAVENOUS at 15:43

## 2019-02-22 RX ADMIN — POTASSIUM CHLORIDE 20 MEQ: 1.5 POWDER, FOR SOLUTION ORAL at 05:32

## 2019-02-22 RX ADMIN — ACETAMINOPHEN 650 MG: 325 TABLET, FILM COATED ORAL at 08:58

## 2019-02-22 RX ADMIN — OXYCODONE HYDROCHLORIDE 5 MG: 5 TABLET ORAL at 15:42

## 2019-02-22 RX ADMIN — ACETAMINOPHEN 650 MG: 325 TABLET, FILM COATED ORAL at 18:10

## 2019-02-22 RX ADMIN — POTASSIUM CHLORIDE 20 MEQ: 1.5 POWDER, FOR SOLUTION ORAL at 08:57

## 2019-02-22 RX ADMIN — FLUTICASONE FUROATE 1 PUFF: 200 POWDER RESPIRATORY (INHALATION) at 08:56

## 2019-02-22 RX ADMIN — POTASSIUM CHLORIDE 20 MEQ: 1.5 POWDER, FOR SOLUTION ORAL at 19:04

## 2019-02-22 RX ADMIN — LEVOTHYROXINE SODIUM 100 MCG: 100 TABLET ORAL at 08:57

## 2019-02-22 RX ADMIN — SENNOSIDES AND DOCUSATE SODIUM 2 TABLET: 8.6; 5 TABLET ORAL at 08:58

## 2019-02-22 RX ADMIN — BENZOCAINE, MENTHOL 1 LOZENGE: 15; 3.6 LOZENGE ORAL at 05:33

## 2019-02-22 RX ADMIN — OXYCODONE HYDROCHLORIDE 5 MG: 5 TABLET ORAL at 21:48

## 2019-02-22 RX ADMIN — OXYCODONE HYDROCHLORIDE 5 MG: 5 TABLET ORAL at 01:18

## 2019-02-22 RX ADMIN — MILRINONE LACTATE 0.3 MCG/KG/MIN: 200 INJECTION, SOLUTION INTRAVENOUS at 12:27

## 2019-02-22 RX ADMIN — ACETAMINOPHEN 650 MG: 325 TABLET, FILM COATED ORAL at 21:48

## 2019-02-22 RX ADMIN — DULOXETINE 20 MG: 20 CAPSULE, DELAYED RELEASE ORAL at 08:56

## 2019-02-22 RX ADMIN — POLYETHYLENE GLYCOL 3350 17 G: 17 POWDER, FOR SOLUTION ORAL at 08:58

## 2019-02-22 RX ADMIN — ACETAMINOPHEN 650 MG: 325 TABLET, FILM COATED ORAL at 05:45

## 2019-02-22 RX ADMIN — SENNOSIDES AND DOCUSATE SODIUM 2 TABLET: 8.6; 5 TABLET ORAL at 20:17

## 2019-02-22 RX ADMIN — CEFAZOLIN 1 G: 1 INJECTION, POWDER, FOR SOLUTION INTRAMUSCULAR; INTRAVENOUS at 08:54

## 2019-02-22 RX ADMIN — ACETAMINOPHEN 650 MG: 325 TABLET, FILM COATED ORAL at 01:18

## 2019-02-22 RX ADMIN — BENZOCAINE, MENTHOL 1 LOZENGE: 15; 3.6 LOZENGE ORAL at 11:01

## 2019-02-22 RX ADMIN — MESALAMINE 2400 MG: 800 TABLET, DELAYED RELEASE ORAL at 20:14

## 2019-02-22 RX ADMIN — Medication 12.5 MG: at 08:57

## 2019-02-22 RX ADMIN — MESALAMINE 2400 MG: 800 TABLET, DELAYED RELEASE ORAL at 08:57

## 2019-02-22 RX ADMIN — TAMSULOSIN HYDROCHLORIDE 0.8 MG: 0.4 CAPSULE ORAL at 18:07

## 2019-02-22 ASSESSMENT — ACTIVITIES OF DAILY LIVING (ADL)
ADLS_ACUITY_SCORE: 13

## 2019-02-22 ASSESSMENT — PAIN DESCRIPTION - DESCRIPTORS
DESCRIPTORS: ACHING;DISCOMFORT
DESCRIPTORS: ACHING;DISCOMFORT

## 2019-02-22 ASSESSMENT — MIFFLIN-ST. JEOR: SCORE: 1388.74

## 2019-02-22 NOTE — OP NOTE
Procedure Date: 2019      PREOPERATIVE DIAGNOSIS:  Dilated cardiomyopathy, status post cardiogenic shock.        POSTOPERATIVE DIAGNOSIS:  Dilated cardiomyopathy, status post cardiogenic shock.        PROCEDURE:  Placement of a subclavian intra-aortic balloon pump.      SURGEON:  Ben Olea MD      CARDIOLOGIST:  Yves Rodrigues MD        ASSISTANT:  Sheila Moralez PA-C.      OPERATIVE INDICATIONS:  The patient is a 53-year-old gentleman with dilated cardiomyopathy was admitted to the [facility] with cardiogenic shock.  He had a balloon pump that had to be removed because of clot in the pump.  A decision was made to proceed with a new subclavian intra-aortic balloon pump.  I discussed the risks and benefits of surgery with the patient including risk of death, stroke, bleeding, renal failure and arrhythmias.  He willing to proceed with surgery.      DESCRIPTION OF OPERATION:  The patient was brought to operating room in stable condition.  Following the light sedation, the patient's chest was prepped and draped in the usual manner.  The infraclavicular incision was opened.  The stump of the sheath was identified.  A new guidewire was inserted through the sheath into the descending aorta.  An intraaortic balloon pump was placed in adequate position.  The intraballoon pump was activated with stable hemodynamics.  The wound was closed in layers.  The patient was transferred to the recovery room in stable condition.         BEN OLEA MD             D: 2019   T: 2019   MT: CHRISTINA      Name:     ANA ROSA BALDWIN   MRN:      [id number]        Account:        [id number]   :      1965           Procedure Date: 2019      Document: [id number]       cc: [facility] [[email or web address]] Surgery Billing

## 2019-02-22 NOTE — PLAN OF CARE
Pt remains on Lasix gtt 15mg/hr and Milrinone 0.3 mcg/kg/min. CVP 11, PA 40/20,  CO 2.9, CI 1.7, SVR 1590. Potassium and Magnesium replaced per protocol.     Notify CVTS with any changes.

## 2019-02-22 NOTE — PLAN OF CARE
Heparin gtt off, IABP placed at noon today; CVP 18-20, Lasix gtt increased to 15 mg/hr; ambulated in halls; Potassium replaced per protocol. JOSEPHINE: CI 1.6, SVR 1500s     See flowsheet and MAR for more details

## 2019-02-22 NOTE — BRIEF OP NOTE
[facility], [place]    Brief Operative Note    Pre-operative diagnosis: Heart failure  Post-operative diagnosis Heart failure  Procedure: Procedure(s):  SUBCLAVIAN BALLOON PUMP PLACEMENT  Surgeon: Surgeon(s) and Role:     * Ben White MD - Primary     * Yves Rodrigues MD - Assisting      * Sheila Ann PA-C  Anesthesia: General   Estimated blood loss: Less than 30 cc  Drains: None   Specimens: None   Findings:   See op report   Complications: See op report

## 2019-02-22 NOTE — PROGRESS NOTES
Transplant Teaching    Writer met with patient and parents,  to complete heart transplant teaching. We reviewed the candidate selection process, insurance prior authorization, UNOS priority statuses, the waiting period, donor issues (inclduing PHS risk), and the pre-, pat-, and post-op periods. In addition, we discussed some of the side effects of prednisone including elevated blood sugars, muscle weakness, sun sensitivity, and mood changes. We also reviewed the major risks of transplantation including rejection, infection and transplant vasculopathy. We also discussed patient and caregiver responsibilities before and after transplant including the need for patient to identify a caregiver to be present for education and to assist patient post discharge. Throughout the 90-minute session, the patient and family were attentive, eager to learn, and asked appropriate questions.  Patient  was given a copy of the UNOS brochure on Multiple Waitlisting, the  Heart Transplant brochure including current program statistics, and a copy of the transplant handbook for review.  Gave patient the transplant office toll-free number and encouraged to call with future questions or concerns.  Receipt of Information signed prior to listing.    In follow up, the patient was instructed on completing the following items for his/her transplant evaluation:    2-19-19  Discussed new lung nodules on CT with Dr. Beck, follow up in 1 year   Hold on vaccines at this time, as listed status 2 (per Dr. Donis)  Update labs-some pending

## 2019-02-22 NOTE — PROGRESS NOTES
[facility] Cardiology Progress Note           Assessment and Plan:   Everton Larios is a 54 yo gentleman with a h/o NICM, HFrEF (EF 15%) s/p ICD placement (2008, generator change 2018), congenital ASD repair in childhood, paroxysmal atrial fibrillation s/p ablation and cardioversion, hypothyroidism, EtOH abuse, ulcerative colitis, remote history of GI bleed with splenic embolization, and a recent admission in 11/2018 for ambulatory cardiogenic shock requiring initiation of IV inotropes, who was admitted for LVAD placement vs heart transplantation.     # Chronic decompensated systolic heart failure s/p L subclavin balloon pump (2/21)  # Ambulatory cardiogenic shock, inotrope dependent  # NICM s/p ICD  # H/o congenital ASD repair  NYHA class: IV  AHA stage: D  Etiology: NICM  - currently status 2 for heart transplant  - continue milrinone at 0.25 mcg/kg/min  - on lasix infusion 15 mg/hr  - continue spironolactone 12.5 mg daily  - continue to monitor [employer] numbers Q6H  - hold heparin infusion for today regarding IABP due to surgical site oozing, consider restarting infusion tomorrow.     # Paroxysmal Afib s/p ablation  - continue PTA amiodarone 200 mg daily  - no AV lily blocking agents     # Hypothyroidism: continue PTA levothyroxine  # Ulcerative colitis: currently in remission, continue PTA mesalamine  # GERD: continue PTA omeprazole  # BPH: continue PTA tamsulosin  # Depression: continue PTA duloxetine  # H/o EtOH abuse: currently denies any EtOH since 4 months ago    FEN: 2g Na, 2L fluid restriction  Ppx: heparin gtt  LDA: PIV, R PICC, RIJ PA catheter, subclavian IABP  Dispo: Pending potential heart transplant this admission, currently status 2     CODE: Full     Patient was seen and discussed with Dr. Jose Alfredo Young, who agrees with the assessment and plan.    Constantin Rockwell MD PGY5  Cardiology Fellow       Subjective:     Patient denies current chest pain, dyspnea on exertion, orthopnea, PND, lightheadedness,  or palpitations.           Medications:       amiodarone  200 mg Oral Daily     ceFAZolin  1 g Intravenous Q8H     DULoxetine  20 mg Oral Daily     fluticasone  1 puff Inhalation Daily     levothyroxine  100 mcg Oral Daily     mesalamine  2,400 mg Oral BID     montelukast  10 mg Oral At Bedtime     omeprazole  40 mg Oral QPM     potassium chloride  20 mEq Oral Daily     sodium chloride (PF)  3 mL Intracatheter Q8H     spironolactone  12.5 mg Oral Daily     tamsulosin  0.4 mg Oral Daily with supper       EPINEPHrine IV infusion ADULT       furosemide 15 mg/hr (02/22/19 0800)     HEParin Stopped (02/21/19 0759)     - MEDICATION INSTRUCTIONS -       milrinone 0.3 mcg/kg/min (02/22/19 0800)     Reason anticoagulation order not selected       norepinephrine       - MEDICATION INSTRUCTIONS -            Objective:          Physical Exam:   Temp:  [97.5  F (36.4  C)-98.2  F (36.8  C)] 97.9  F (36.6  C)  Pulse:  [] 73  Heart Rate:  [59-75] 66  Resp:  [14-17] 16  BP: ()/(38-90) 84/68  SpO2:  [94 %-99 %] 94 %  I/O last 3 completed shifts:  In: 2353.57 [P.O.:1700; I.V.:653.57]  Out: 4145 [Urine:4145]    Vitals:    02/17/19 1330 02/18/19 0500 02/19/19 0500 02/22/19 0700   Weight: 63.5 kg (140 lb 1.6 oz) 60.3 kg (133 lb) 59.3 kg (130 lb 12.8 oz) 57.9 kg (127 lb 11.2 oz)     CVP 11,   PA 34/18  CO/CI 2.9/1.7  SVO2 53%    General:  Alert, sitting up in bed, no acute distress.  HEENT: Atraumatic, anicteric sclera, extraocular motion intact, nares dry, mucus membranes moist.  Chest: Subclavian IABP  CV: Normal rate, regular rhythm. S1, prominent S2.  No murmurs, rubs or gallops. JVD not seen.  Resp: Clear to auscultation bilaterally, no accessory muscle use.  Abdomen: Bowel sounds +, soft, nontender, no hepatosplenomegaly, no masses.  Extremities: No peripheral edema, warm and well perfused.  Skin: No rashes, bruising, or jaundice.  Neuro: Alert, oriented x 3, symmetric facial expressions, moving extremities  equally  Psych: Pleasant affect.         Data:   BMP  Recent Labs   Lab 02/22/19  0316 02/21/19  2133 02/21/19  1420 02/21/19  1329 02/21/19 0315 02/20/19  1623     --  136  --  137  --  136   POTASSIUM 4.0 3.9 3.6 2.6* 3.9   < > 4.3   CHLORIDE 96  --  100  --  99  --  102   RADAMES 8.8  --  8.2*  --  8.5  --  8.1*   CO2 29  --  26  --  29  --  24   BUN 18  --  18  --  19  --  18   CR 0.96  --  0.88  --  1.04  --  0.95   GLC 99  --  85  --  129*  --  155*    < > = values in this interval not displayed.     LFTs  Recent Labs   Lab 02/17/19  1627   ALKPHOS 146   AST 19   ALT 20   BILITOTAL 2.0*   PROTTOTAL 6.8   ALBUMIN 3.5      CBC  Recent Labs   Lab 02/22/19 0316 02/21/19 0315 02/20/19 2014 02/20/19  0304   WBC 7.4 7.7 8.4 7.1   RBC 4.03* 4.24* 4.38* 4.19*   HGB 12.1* 12.7* 13.2* 12.6*   HCT 37.3* 38.8* 40.0 38.8*   MCV 93 92 91 93   MCH 30.0 30.0 30.1 30.1   MCHC 32.4 32.7 33.0 32.5   RDW 13.4 13.6 13.6 13.3   * 119* 128* 134*     INR  Recent Labs   Lab 02/21/19 0315 02/20/19 2014 02/17/19  1627   INR 1.33* 1.35* 1.47*

## 2019-02-22 NOTE — ANESTHESIA POSTPROCEDURE EVALUATION
Anesthesia POST Procedure Evaluation    Patient: Everton Larios   MRN:     [id number] Gender:   male   Age:    53 year old :      1965        Preoperative Diagnosis: Heart failure   Procedure(s):  SUBCLAVIAN BALLOON PUMP PLACEMENT   Postop Comments: No value filed.       Anesthesia Type:  MAC    Reportable Event: NO     PAIN: Uncomplicated   Sign Out status: Comfortable, Well controlled pain     PONV: No PONV   Sign Out status:  No Nausea or Vomiting     Neuro/Psych: Uneventful perioperative course   Sign Out Status: Preoperative baseline; Age appropriate mentation     Airway/Resp.: Uneventful perioperative course   Sign Out Status: Non labored breathing, age appropriate RR; Resp. Status within EXPECTED Parameters     CV: Uneventful perioperative course   Sign Out status: Appropriate BP and perfusion indices; Appropriate HR/Rhythm     Disposition:   Sign Out in:  ICU  Disposition:  ICU  Recovery Course: Recovery in ICU  Follow-Up: Not required           Last Anesthesia Record Vitals:  CRNA VITALS  2019 1137 - 2019 1237      2019             ART BP:  88/51  (Abnormal)     ART Mean:  68    Ht Rate:  60    SpO2:  95 %          Last PACU/Preop Vitals:  Vitals:    19 1700 19 1800 19   BP: (!) 76/47 (!) 80/70    Pulse: 59 66    Resp:   16   Temp:   36.4  C (97.5  F)   SpO2: 96% 97%          Electronically Signed By: Bronson Rainey MD, 2019, 8:44 PM

## 2019-02-23 ENCOUNTER — RESULTS ONLY (OUTPATIENT)
Dept: OTHER | Facility: CLINIC | Age: 54
End: 2019-02-23

## 2019-02-23 ENCOUNTER — ANESTHESIA EVENT (OUTPATIENT)
Dept: SURGERY | Facility: CLINIC | Age: 54
DRG: 001 | End: 2019-02-23
Payer: COMMERCIAL

## 2019-02-23 ENCOUNTER — ANESTHESIA (OUTPATIENT)
Dept: SURGERY | Facility: CLINIC | Age: 54
DRG: 001 | End: 2019-02-23
Payer: COMMERCIAL

## 2019-02-23 ENCOUNTER — APPOINTMENT (OUTPATIENT)
Dept: GENERAL RADIOLOGY | Facility: CLINIC | Age: 54
DRG: 001 | End: 2019-02-23
Attending: STUDENT IN AN ORGANIZED HEALTH CARE EDUCATION/TRAINING PROGRAM
Payer: COMMERCIAL

## 2019-02-23 ENCOUNTER — DOCUMENTATION ONLY (OUTPATIENT)
Dept: TRANSPLANT | Facility: CLINIC | Age: 54
End: 2019-02-23

## 2019-02-23 ENCOUNTER — ORGAN (OUTPATIENT)
Dept: TRANSPLANT | Facility: CLINIC | Age: 54
End: 2019-02-23

## 2019-02-23 DIAGNOSIS — Z76.82 AWAITING ORGAN TRANSPLANT: Primary | ICD-10-CM

## 2019-02-23 LAB
ABO + RH BLD: NORMAL
ALBUMIN SERPL-MCNC: 3.4 G/DL (ref 3.4–5)
ALBUMIN UR-MCNC: NEGATIVE MG/DL
ALP SERPL-CCNC: 169 U/L (ref 40–150)
ALT SERPL W P-5'-P-CCNC: 15 U/L (ref 0–70)
AMYLASE SERPL-CCNC: 22 U/L (ref 30–110)
ANION GAP SERPL CALCULATED.3IONS-SCNC: 10 MMOL/L (ref 3–14)
ANION GAP SERPL CALCULATED.3IONS-SCNC: 10 MMOL/L (ref 3–14)
ANION GAP SERPL CALCULATED.3IONS-SCNC: 11 MMOL/L (ref 3–14)
APPEARANCE UR: CLEAR
APTT PPP: 41 SEC (ref 22–37)
AST SERPL W P-5'-P-CCNC: 21 U/L (ref 0–45)
BACTERIA SPEC CULT: ABNORMAL
BACTERIA SPEC CULT: ABNORMAL
BASE EXCESS BLDV CALC-SCNC: 5.6 MMOL/L
BASE EXCESS BLDV CALC-SCNC: 6.9 MMOL/L
BASE EXCESS BLDV CALC-SCNC: 7.5 MMOL/L
BASOPHILS # BLD AUTO: 0.1 10E9/L (ref 0–0.2)
BASOPHILS NFR BLD AUTO: 0.7 %
BILIRUB SERPL-MCNC: 1.6 MG/DL (ref 0.2–1.3)
BILIRUB UR QL STRIP: NEGATIVE
BLD GP AB SCN SERPL QL: NORMAL
BLD GP AB SCN SERPL QL: NORMAL
BLD PROD TYP BPU: NORMAL
BLOOD BANK CMNT PATIENT-IMP: NORMAL
BLOOD BANK CMNT PATIENT-IMP: NORMAL
BUN SERPL-MCNC: 21 MG/DL (ref 7–30)
BUN SERPL-MCNC: 25 MG/DL (ref 7–30)
BUN SERPL-MCNC: 28 MG/DL (ref 7–30)
CALCIUM SERPL-MCNC: 8.8 MG/DL (ref 8.5–10.1)
CALCIUM SERPL-MCNC: 8.9 MG/DL (ref 8.5–10.1)
CALCIUM SERPL-MCNC: 9 MG/DL (ref 8.5–10.1)
CHLORIDE SERPL-SCNC: 93 MMOL/L (ref 94–109)
CHLORIDE SERPL-SCNC: 94 MMOL/L (ref 94–109)
CHLORIDE SERPL-SCNC: 94 MMOL/L (ref 94–109)
CO2 SERPL-SCNC: 30 MMOL/L (ref 20–32)
CO2 SERPL-SCNC: 30 MMOL/L (ref 20–32)
CO2 SERPL-SCNC: 31 MMOL/L (ref 20–32)
COLOR UR AUTO: YELLOW
CREAT SERPL-MCNC: 0.88 MG/DL (ref 0.66–1.25)
CREAT SERPL-MCNC: 1.03 MG/DL (ref 0.66–1.25)
CREAT SERPL-MCNC: 1.04 MG/DL (ref 0.66–1.25)
DIFFERENTIAL METHOD BLD: ABNORMAL
EOSINOPHIL # BLD AUTO: 0.2 10E9/L (ref 0–0.7)
EOSINOPHIL NFR BLD AUTO: 3.5 %
ERYTHROCYTE [DISTWIDTH] IN BLOOD BY AUTOMATED COUNT: 13.3 % (ref 10–15)
ERYTHROCYTE [DISTWIDTH] IN BLOOD BY AUTOMATED COUNT: 13.4 % (ref 10–15)
GFR SERPL CREATININE-BSD FRML MDRD: 80 ML/MIN/{1.73_M2}
GFR SERPL CREATININE-BSD FRML MDRD: 81 ML/MIN/{1.73_M2}
GFR SERPL CREATININE-BSD FRML MDRD: >90 ML/MIN/{1.73_M2}
GLUCOSE SERPL-MCNC: 114 MG/DL (ref 70–99)
GLUCOSE SERPL-MCNC: 91 MG/DL (ref 70–99)
GLUCOSE SERPL-MCNC: 97 MG/DL (ref 70–99)
GLUCOSE UR STRIP-MCNC: NEGATIVE MG/DL
GRAM STN SPEC: ABNORMAL
HBA1C MFR BLD: 5.7 % (ref 0–5.6)
HBA1C MFR BLD: 5.7 % (ref 0–5.6)
HCO3 BLDV-SCNC: 31 MMOL/L (ref 21–28)
HCO3 BLDV-SCNC: 33 MMOL/L (ref 21–28)
HCO3 BLDV-SCNC: 34 MMOL/L (ref 21–28)
HCT VFR BLD AUTO: 41.2 % (ref 40–53)
HCT VFR BLD AUTO: 43.3 % (ref 40–53)
HGB BLD-MCNC: 13.4 G/DL (ref 13.3–17.7)
HGB BLD-MCNC: 14.2 G/DL (ref 13.3–17.7)
HGB UR QL STRIP: NEGATIVE
HYALINE CASTS #/AREA URNS LPF: 1 /LPF (ref 0–2)
IMM GRANULOCYTES # BLD: 0 10E9/L (ref 0–0.4)
IMM GRANULOCYTES NFR BLD: 0.4 %
INR PPP: 1.2 (ref 0.86–1.14)
KETONES UR STRIP-MCNC: NEGATIVE MG/DL
LEUKOCYTE ESTERASE UR QL STRIP: NEGATIVE
LMWH PPP CHRO-ACNC: <0.1 IU/ML
LYMPHOCYTES # BLD AUTO: 1.2 10E9/L (ref 0.8–5.3)
LYMPHOCYTES NFR BLD AUTO: 17.8 %
Lab: ABNORMAL
MAGNESIUM SERPL-MCNC: 2 MG/DL (ref 1.6–2.3)
MAGNESIUM SERPL-MCNC: 2 MG/DL (ref 1.6–2.3)
MAGNESIUM SERPL-MCNC: 2.2 MG/DL (ref 1.6–2.3)
MCH RBC QN AUTO: 29.7 PG (ref 26.5–33)
MCH RBC QN AUTO: 30.3 PG (ref 26.5–33)
MCHC RBC AUTO-ENTMCNC: 32.5 G/DL (ref 31.5–36.5)
MCHC RBC AUTO-ENTMCNC: 32.8 G/DL (ref 31.5–36.5)
MCV RBC AUTO: 91 FL (ref 78–100)
MCV RBC AUTO: 93 FL (ref 78–100)
MONOCYTES # BLD AUTO: 0.6 10E9/L (ref 0–1.3)
MONOCYTES NFR BLD AUTO: 9.3 %
NEUTROPHILS # BLD AUTO: 4.7 10E9/L (ref 1.6–8.3)
NEUTROPHILS NFR BLD AUTO: 68.3 %
NITRATE UR QL: NEGATIVE
NRBC # BLD AUTO: 0 10*3/UL
NRBC BLD AUTO-RTO: 0 /100
NUM BPU REQUESTED: 4
O2/TOTAL GAS SETTING VFR VENT: 21 %
O2/TOTAL GAS SETTING VFR VENT: 21 %
O2/TOTAL GAS SETTING VFR VENT: ABNORMAL %
OXYHGB MFR BLDV: 59 %
OXYHGB MFR BLDV: 62 %
OXYHGB MFR BLDV: 69 %
PCO2 BLDV: 44 MM HG (ref 40–50)
PCO2 BLDV: 49 MM HG (ref 40–50)
PCO2 BLDV: 53 MM HG (ref 40–50)
PH BLDV: 7.42 PH (ref 7.32–7.43)
PH BLDV: 7.43 PH (ref 7.32–7.43)
PH BLDV: 7.45 PH (ref 7.32–7.43)
PH UR STRIP: 6 PH (ref 5–7)
PHOSPHATE SERPL-MCNC: 3.6 MG/DL (ref 2.5–4.5)
PLATELET # BLD AUTO: 107 10E9/L (ref 150–450)
PLATELET # BLD AUTO: 122 10E9/L (ref 150–450)
PO2 BLDV: 32 MM HG (ref 25–47)
PO2 BLDV: 34 MM HG (ref 25–47)
PO2 BLDV: 38 MM HG (ref 25–47)
POTASSIUM BLD-SCNC: 3.6 MMOL/L (ref 3.4–5.3)
POTASSIUM SERPL-SCNC: 3.4 MMOL/L (ref 3.4–5.3)
POTASSIUM SERPL-SCNC: 3.6 MMOL/L (ref 3.4–5.3)
POTASSIUM SERPL-SCNC: 3.8 MMOL/L (ref 3.4–5.3)
POTASSIUM SERPL-SCNC: 4.1 MMOL/L (ref 3.4–5.3)
PROT SERPL-MCNC: 7.8 G/DL (ref 6.8–8.8)
RBC # BLD AUTO: 4.51 10E12/L (ref 4.4–5.9)
RBC # BLD AUTO: 4.68 10E12/L (ref 4.4–5.9)
RBC #/AREA URNS AUTO: <1 /HPF (ref 0–2)
SODIUM SERPL-SCNC: 133 MMOL/L (ref 133–144)
SODIUM SERPL-SCNC: 133 MMOL/L (ref 133–144)
SODIUM SERPL-SCNC: 136 MMOL/L (ref 133–144)
SOURCE: NORMAL
SP GR UR STRIP: 1.01 (ref 1–1.03)
SPECIMEN EXP DATE BLD: NORMAL
SPECIMEN EXP DATE BLD: NORMAL
SPECIMEN SOURCE: ABNORMAL
SPECIMEN SOURCE: ABNORMAL
UROBILINOGEN UR STRIP-MCNC: NORMAL MG/DL (ref 0–2)
WBC # BLD AUTO: 6.5 10E9/L (ref 4–11)
WBC # BLD AUTO: 6.9 10E9/L (ref 4–11)
WBC #/AREA URNS AUTO: <1 /HPF (ref 0–5)

## 2019-02-23 PROCEDURE — 40000196 ZZH STATISTIC RAPCV CVP MONITORING

## 2019-02-23 PROCEDURE — 86900 BLOOD TYPING SEROLOGIC ABO: CPT | Performed by: STUDENT IN AN ORGANIZED HEALTH CARE EDUCATION/TRAINING PROGRAM

## 2019-02-23 PROCEDURE — 25000132 ZZH RX MED GY IP 250 OP 250 PS 637: Performed by: INTERNAL MEDICINE

## 2019-02-23 PROCEDURE — 84132 ASSAY OF SERUM POTASSIUM: CPT | Performed by: INTERNAL MEDICINE

## 2019-02-23 PROCEDURE — 84100 ASSAY OF PHOSPHORUS: CPT | Performed by: INTERNAL MEDICINE

## 2019-02-23 PROCEDURE — 20000004 ZZH R&B ICU UMMC

## 2019-02-23 PROCEDURE — 71045 X-RAY EXAM CHEST 1 VIEW: CPT | Mod: 76

## 2019-02-23 PROCEDURE — 85610 PROTHROMBIN TIME: CPT | Performed by: INTERNAL MEDICINE

## 2019-02-23 PROCEDURE — 83735 ASSAY OF MAGNESIUM: CPT | Performed by: INTERNAL MEDICINE

## 2019-02-23 PROCEDURE — 86644 CMV ANTIBODY: CPT | Performed by: INTERNAL MEDICINE

## 2019-02-23 PROCEDURE — 80048 BASIC METABOLIC PNL TOTAL CA: CPT | Performed by: STUDENT IN AN ORGANIZED HEALTH CARE EDUCATION/TRAINING PROGRAM

## 2019-02-23 PROCEDURE — 25000132 ZZH RX MED GY IP 250 OP 250 PS 637: Performed by: STUDENT IN AN ORGANIZED HEALTH CARE EDUCATION/TRAINING PROGRAM

## 2019-02-23 PROCEDURE — 83036 HEMOGLOBIN GLYCOSYLATED A1C: CPT | Performed by: INTERNAL MEDICINE

## 2019-02-23 PROCEDURE — 80053 COMPREHEN METABOLIC PANEL: CPT | Performed by: INTERNAL MEDICINE

## 2019-02-23 PROCEDURE — 40000076 ZZH STATISTIC IABP MONITORING

## 2019-02-23 PROCEDURE — 86850 RBC ANTIBODY SCREEN: CPT | Performed by: STUDENT IN AN ORGANIZED HEALTH CARE EDUCATION/TRAINING PROGRAM

## 2019-02-23 PROCEDURE — 84132 ASSAY OF SERUM POTASSIUM: CPT | Performed by: STUDENT IN AN ORGANIZED HEALTH CARE EDUCATION/TRAINING PROGRAM

## 2019-02-23 PROCEDURE — 25000128 H RX IP 250 OP 636: Performed by: STUDENT IN AN ORGANIZED HEALTH CARE EDUCATION/TRAINING PROGRAM

## 2019-02-23 PROCEDURE — 25000125 ZZHC RX 250: Performed by: STUDENT IN AN ORGANIZED HEALTH CARE EDUCATION/TRAINING PROGRAM

## 2019-02-23 PROCEDURE — 86901 BLOOD TYPING SEROLOGIC RH(D): CPT | Performed by: STUDENT IN AN ORGANIZED HEALTH CARE EDUCATION/TRAINING PROGRAM

## 2019-02-23 PROCEDURE — 40000048 ZZH STATISTIC DAILY SWAN MONITORING

## 2019-02-23 PROCEDURE — 85730 THROMBOPLASTIN TIME PARTIAL: CPT | Performed by: INTERNAL MEDICINE

## 2019-02-23 PROCEDURE — 25000128 H RX IP 250 OP 636: Performed by: PHYSICIAN ASSISTANT

## 2019-02-23 PROCEDURE — 99291 CRITICAL CARE FIRST HOUR: CPT | Mod: GC | Performed by: INTERNAL MEDICINE

## 2019-02-23 PROCEDURE — 85520 HEPARIN ASSAY: CPT | Performed by: STUDENT IN AN ORGANIZED HEALTH CARE EDUCATION/TRAINING PROGRAM

## 2019-02-23 PROCEDURE — 87205 SMEAR GRAM STAIN: CPT | Performed by: STUDENT IN AN ORGANIZED HEALTH CARE EDUCATION/TRAINING PROGRAM

## 2019-02-23 PROCEDURE — 93010 ELECTROCARDIOGRAM REPORT: CPT | Performed by: INTERNAL MEDICINE

## 2019-02-23 PROCEDURE — 83735 ASSAY OF MAGNESIUM: CPT | Performed by: STUDENT IN AN ORGANIZED HEALTH CARE EDUCATION/TRAINING PROGRAM

## 2019-02-23 PROCEDURE — 25000128 H RX IP 250 OP 636: Performed by: INTERNAL MEDICINE

## 2019-02-23 PROCEDURE — 93005 ELECTROCARDIOGRAM TRACING: CPT

## 2019-02-23 PROCEDURE — 82150 ASSAY OF AMYLASE: CPT | Performed by: INTERNAL MEDICINE

## 2019-02-23 PROCEDURE — 71045 X-RAY EXAM CHEST 1 VIEW: CPT

## 2019-02-23 PROCEDURE — 81001 URINALYSIS AUTO W/SCOPE: CPT | Performed by: STUDENT IN AN ORGANIZED HEALTH CARE EDUCATION/TRAINING PROGRAM

## 2019-02-23 PROCEDURE — 82805 BLOOD GASES W/O2 SATURATION: CPT | Performed by: STUDENT IN AN ORGANIZED HEALTH CARE EDUCATION/TRAINING PROGRAM

## 2019-02-23 PROCEDURE — 85027 COMPLETE CBC AUTOMATED: CPT | Performed by: STUDENT IN AN ORGANIZED HEALTH CARE EDUCATION/TRAINING PROGRAM

## 2019-02-23 PROCEDURE — 87389 HIV-1 AG W/HIV-1&-2 AB AG IA: CPT | Performed by: INTERNAL MEDICINE

## 2019-02-23 PROCEDURE — 85025 COMPLETE CBC W/AUTO DIFF WBC: CPT | Performed by: INTERNAL MEDICINE

## 2019-02-23 PROCEDURE — 86665 EPSTEIN-BARR CAPSID VCA: CPT | Performed by: INTERNAL MEDICINE

## 2019-02-23 PROCEDURE — 40000275 ZZH STATISTIC RCP TIME EA 10 MIN

## 2019-02-23 RX ORDER — LIDOCAINE 40 MG/G
CREAM TOPICAL
Status: DISCONTINUED | OUTPATIENT
Start: 2019-02-23 | End: 2019-02-24 | Stop reason: HOSPADM

## 2019-02-23 RX ORDER — MYCOPHENOLATE MOFETIL 250 MG/1
1500 CAPSULE ORAL EVERY 12 HOURS
Status: DISCONTINUED | OUTPATIENT
Start: 2019-02-23 | End: 2019-02-24 | Stop reason: HOSPADM

## 2019-02-23 RX ORDER — CEFAZOLIN SODIUM 2 G/100ML
2 INJECTION, SOLUTION INTRAVENOUS
Status: COMPLETED | OUTPATIENT
Start: 2019-02-23 | End: 2019-02-24

## 2019-02-23 RX ORDER — CEFAZOLIN SODIUM 1 G/3ML
1 INJECTION, POWDER, FOR SOLUTION INTRAMUSCULAR; INTRAVENOUS SEE ADMIN INSTRUCTIONS
Status: DISCONTINUED | OUTPATIENT
Start: 2019-02-23 | End: 2019-02-24 | Stop reason: HOSPADM

## 2019-02-23 RX ADMIN — ACETAMINOPHEN 650 MG: 325 TABLET, FILM COATED ORAL at 19:43

## 2019-02-23 RX ADMIN — POTASSIUM CHLORIDE 20 MEQ: 1.5 POWDER, FOR SOLUTION ORAL at 14:51

## 2019-02-23 RX ADMIN — TAMSULOSIN HYDROCHLORIDE 0.8 MG: 0.4 CAPSULE ORAL at 16:56

## 2019-02-23 RX ADMIN — Medication 15 MG/HR: at 04:59

## 2019-02-23 RX ADMIN — Medication 12.5 MG: at 08:44

## 2019-02-23 RX ADMIN — ACETAMINOPHEN 650 MG: 325 TABLET, FILM COATED ORAL at 17:09

## 2019-02-23 RX ADMIN — OMEPRAZOLE 40 MG: 20 CAPSULE, DELAYED RELEASE ORAL at 19:43

## 2019-02-23 RX ADMIN — BENZOCAINE, MENTHOL 1 LOZENGE: 15; 3.6 LOZENGE ORAL at 14:16

## 2019-02-23 RX ADMIN — DULOXETINE 20 MG: 20 CAPSULE, DELAYED RELEASE ORAL at 08:43

## 2019-02-23 RX ADMIN — CEFAZOLIN 1 G: 1 INJECTION, POWDER, FOR SOLUTION INTRAMUSCULAR; INTRAVENOUS at 08:43

## 2019-02-23 RX ADMIN — MILRINONE LACTATE 0.3 MCG/KG/MIN: 200 INJECTION, SOLUTION INTRAVENOUS at 21:19

## 2019-02-23 RX ADMIN — MESALAMINE 2400 MG: 800 TABLET, DELAYED RELEASE ORAL at 19:42

## 2019-02-23 RX ADMIN — MILRINONE LACTATE 0.3 MCG/KG/MIN: 200 INJECTION, SOLUTION INTRAVENOUS at 05:06

## 2019-02-23 RX ADMIN — CEFAZOLIN 1 G: 1 INJECTION, POWDER, FOR SOLUTION INTRAMUSCULAR; INTRAVENOUS at 16:56

## 2019-02-23 RX ADMIN — ACETAMINOPHEN 650 MG: 325 TABLET, FILM COATED ORAL at 08:42

## 2019-02-23 RX ADMIN — POTASSIUM CHLORIDE 20 MEQ: 1.5 POWDER, FOR SOLUTION ORAL at 18:51

## 2019-02-23 RX ADMIN — MESALAMINE 2400 MG: 800 TABLET, DELAYED RELEASE ORAL at 08:44

## 2019-02-23 RX ADMIN — LEVOTHYROXINE SODIUM 100 MCG: 100 TABLET ORAL at 08:43

## 2019-02-23 RX ADMIN — POTASSIUM CHLORIDE 20 MEQ: 1.5 POWDER, FOR SOLUTION ORAL at 08:43

## 2019-02-23 RX ADMIN — FLUTICASONE FUROATE 1 PUFF: 200 POWDER RESPIRATORY (INHALATION) at 08:44

## 2019-02-23 RX ADMIN — POTASSIUM CHLORIDE 20 MEQ: 1.5 POWDER, FOR SOLUTION ORAL at 05:05

## 2019-02-23 RX ADMIN — AMIODARONE HYDROCHLORIDE 200 MG: 200 TABLET ORAL at 08:43

## 2019-02-23 RX ADMIN — MAGNESIUM SULFATE HEPTAHYDRATE 2 G: 40 INJECTION, SOLUTION INTRAVENOUS at 18:51

## 2019-02-23 ASSESSMENT — ACTIVITIES OF DAILY LIVING (ADL)
ADLS_ACUITY_SCORE: 13

## 2019-02-23 ASSESSMENT — NEW YORK HEART ASSOCIATION (NYHA) CLASSIFICATION: NYHA FUNCTIONAL CLASS: IV

## 2019-02-23 ASSESSMENT — ENCOUNTER SYMPTOMS: DYSRHYTHMIAS: 1

## 2019-02-23 ASSESSMENT — PAIN DESCRIPTION - DESCRIPTORS: DESCRIPTORS: ACHING;DISCOMFORT

## 2019-02-23 ASSESSMENT — MIFFLIN-ST. JEOR: SCORE: 1383.75

## 2019-02-23 NOTE — PROGRESS NOTES
PATHOLOGY HLA CROSSMATCH CONSULTATION: DONOR/RECIPIENT  VIRTUAL CROSSMATCH - Heart  Consultation Date: 2019  Consultation Requested by: Dr. Dewayne House  Regarding: Compatibility of  donor organ UNOS #[id number] from OPO: IAOP with Everton Larios    Findings: Regarding a virtual crossmatch between Everton Larios and  donor listed above (match ID [id number]):  The most recent (2/15/19) serum was analyzed.  Noadditional patient serum/sera  were analyzed.  The patient has no antibodies listed with HLA specificity against the donor organ.      Record Review Indicates: I personally reviewed the most recent serum, the historic peak sera, and all other sera with solid-phase HLA Single Antigen test results:  The patient has no HLA antibodies against the donor organ.     The results of this virtual XM are:   -most recent serum: compatible     Disclaimer: Clinical judgement must take into account other factors, such as non-HLA antibodies not detected in the assay. The VXM gives probabilities only.  The probability does not account for the potential for auto-antibodies that may be present in the patient's serum.  These autoantibodies may render the physical crossmatch falsely positive, and would be detected by an autologous crossmatch.  When possible, confirm findings with prospective allogeneic and autologous flow crossmatches before going to transplant as clinically indicated.     Trev Gregorio, PhD,[facility]  Medical Director, Immunology/Histocompatibility Laboratory  Pager [phone]                I, Pallavi Shea, have reviewed the laboratory and clinical data relevant for this case and have confirmed Dr. Gregorio's interpretation of the virtual crossmatch. The probability of an incompatible B cell flow crossmatch is approximately near 0% due to antibodies against HLA molecules on the donor organ. I would recommend Everton Larios receive a prospective flow crossmatch  prior to transplantation.    Pallavi Shea MD

## 2019-02-23 NOTE — TELEPHONE ENCOUNTER
HEART donor was identified by Saud Curiel and reviewed with Dr. Donis and Dr. House.  The organ was accepted and pt was called in 4E for transplant.    Donor UNOS ID [id number] and Match ID [id number] confirmed with Dr. Donis.    Donor and recipient blood type reviewed and found to be IDENTICAL.    Recipient with HLA antibodies: NO  Crossmatch required   Prospective:N/A   Virtual:COMPATIBLE  Crossmatch reviewed with Dr. Gregorio, immunology staff on call and deemed negative based on organ specific protocol.     Donor specific antibodies absent, notified Dr. Donis.  Donor does NOT meet criteria to be classified as PHS INCREASED RISK.  Pt was contacted in 4E.  Verified pt has not had any blood transfusions since their last PRA sample on 02/15/2019.   Pt Instructed to remain NPO for pre-op prep.  Instructed to bring medications, oxygen, or equipement.  Pt instructed to come to the [facility]  ER. N/A  Pt on Coumadin: NO   Intervention: n/a  HEART RECIPIENT: Renal function reviewed, pt IS NOT pre-selected for CNI delaying protocol, Dr. Donis notified. @[facility](Cr:0.88)02/23/2019]        ABO/CMV/EBV status note:   UNOS donor ID [id number]  Donor blood type is O: Verified by donor records   Recipient blood type is O: verified by blood bank [facility].   Donor CMV status is positive. Verified by donor records.   Recipient CMV status is positive. Verified in [facility] lab results.   Donor EBV status is positive. Verified by donor records.   Recipient EBV status is positive. Verified in [facility] lab results.   Recipient HSV status is positive. verified in [facility] lab results.

## 2019-02-23 NOTE — PLAN OF CARE
CNS: Alert and oriented. PRN Tylenol administered for left chest incision pain/ right groin pain.   Resp: On room air- oxygen sats >95%.   CV: IABP 1:1 - see flowsheet. ICD/pacemaker - 100% paced. Jaden-see flowsheet. Milrinone drip.   GI: 2 L fluid restriction. Low fat/2400 mg sodium diet.   : Lasix drip. Urine output >50 ml/hr.      Plan: Patient scheduled for heart transplant tomorrow at 0800.

## 2019-02-23 NOTE — PLAN OF CARE
D: Admitted 2/17 with Cardiogenic shock, listed for OHT (2/18)    No acute events overnight:    I: Monitored vitals and assessed pt status.   Running: Lasix (15 mg/hr) + Milrinone (0.3 mcg/kg/min)  PRN:Oxycodone 5mg (x1). Tylenol 650mg (x1)     Lytes replaced per protocol: K=4.0 / 3.4 (+20mEq x2)    A:  Hemodynamics: CVP = (7-9) PA = (36-38 / 20-22) CO = (2.9-4.4)  CI = (1.7-2.6) SVO2 = (52-69) SVR = (0793-7671) PVR = (109-129) SV = (41-63)  Neuro: Afebrile. A0x4. Shoulder and incisional pain controlled with PRN medications.   Resp: VSS on RA, 1L NC applied @ HS per patient request.   CV: 100% Paced. HRs (60-80s)  MAPs (69-85) IABP 1:1, 100% augmentation.   : 1600cc of UO this shift.     GI: (+) Flatus. No BM. Senna x2 given.        P: Continue to monitor Pt status and report changes to treatment team.

## 2019-02-23 NOTE — PROGRESS NOTES
[facility] Cardiology Progress Note  2/23/2019           Assessment and Plan:   Everton Larios is a 52 yo gentleman with a h/o NICM, HFrEF (EF 15%) s/p ICD placement (2008, generator change 2018), congenital ASD repair in childhood, paroxysmal atrial fibrillation s/p ablation and cardioversion, hypothyroidism, EtOH abuse, ulcerative colitis, remote history of GI bleed with splenic embolization, and a recent admission in 11/2018 for ambulatory cardiogenic shock requiring initiation of IV inotropes, who was admitted for heart transplant, now status 2 with a L subclavian balloon pump.     Changes today:  - continue lasix 15 mg/hr gtt  - continue holding heparin gtt given concern for surgical site oozing from IABP  - monitor [employer] numbers Qshift    # Chronic decompensated systolic heart failure s/p L subclavin balloon pump (2/21)  # Ambulatory cardiogenic shock, inotrope dependent  # NICM s/p ICD  # H/o congenital ASD repair  NYHA class: IV  AHA stage: D  Etiology: NICM  - currently status 2 for heart transplant  - continue milrinone at 0.25 mcg/kg/min  - continue lasix gtt 15 mg/hr, continue BMP, Mg BID  - continue spironolactone 12.5 mg daily  - monitor [employer] numbers Qshift     # Paroxysmal Afib s/p ablation  - continue holding heparin gtt given concern for surgical site oozing from IABP  - continue PTA amiodarone 200 mg daily  - no AV lily blocking agents     # Hypothyroidism: continue PTA levothyroxine  # Ulcerative colitis: currently in remission, continue PTA mesalamine  # GERD: continue PTA omeprazole  # BPH: continue PTA tamsulosin  # Depression: continue PTA duloxetine  # H/o EtOH abuse: currently denies any EtOH since 4 months ago    FEN: 2g Na, 2L fluid restriction  Ppx: heparin gtt  LDA: PIV, R PICC, RIJ PA catheter, subclavian IABP  Dispo: Pending potential heart transplant this admission, currently status 2     CODE: Full     Patient was seen and discussed with Dr. Young, who agrees with the assessment  and plan.    Saud Jaeger MD  Internal Medicine PGY-2  Pager [phone]       Subjective:     RN notes reviewed, NAEO. Patient denies current chest pain, shortness of breath or abdominal distension. Currently is sore at left subclavian balloon pump site.          Medications:       amiodarone  200 mg Oral Daily     ceFAZolin  1 g Intravenous Q8H     DULoxetine  20 mg Oral Daily     fluticasone  1 puff Inhalation Daily     levothyroxine  100 mcg Oral Daily     mesalamine  2,400 mg Oral BID     montelukast  10 mg Oral At Bedtime     omeprazole  40 mg Oral QPM     potassium chloride  20 mEq Oral Daily     sodium chloride (PF)  3 mL Intracatheter Q8H     spironolactone  12.5 mg Oral Daily     tamsulosin  0.8 mg Oral Daily with supper       furosemide 15 mg/hr (02/23/19 0600)     HEParin Stopped (02/21/19 0759)     - MEDICATION INSTRUCTIONS -       milrinone 0.3 mcg/kg/min (02/23/19 0600)     Reason anticoagulation order not selected       - MEDICATION INSTRUCTIONS -            Objective:          Physical Exam:   Temp:  [97.9  F (36.6  C)-99.1  F (37.3  C)] 98.2  F (36.8  C)  Pulse:  [59-96] 63  Heart Rate:  [60-80] 63  Resp:  [14-16] 16  BP: ()/(51-82) 110/74  SpO2:  [92 %-98 %] 92 %  I/O last 3 completed shifts:  In: 2176.6 [P.O.:1440; I.V.:736.6]  Out: 4020 [Urine:4020]    Vitals:    02/17/19 1330 02/18/19 0500 02/19/19 0500 02/22/19 0700   Weight: 63.5 kg (140 lb 1.6 oz) 60.3 kg (133 lb) 59.3 kg (130 lb 12.8 oz) 57.9 kg (127 lb 11.2 oz)    02/23/19 0400   Weight: 57.4 kg (126 lb 9.6 oz)     CVP 8   PA 34/18  PCWP 12  CO/CI4.4/2.2  SVO2 69%  SVR 1300    General:  Alert, sitting up in bed, no acute distress.  HEENT: Atraumatic, anicteric sclera, extraocular motion intact, nares dry, mucus membranes moist.  Chest: Subclavian IABP in place, dressings CDI  CV: Normal rate, irregular rhythm. Balloon pump sounds present.  No murmurs, rubs or gallops. JVD not seen.  Resp: Clear to auscultation bilaterally, no  accessory muscle use.  Abdomen: Bowel sounds +, soft, nontender, no hepatosplenomegaly, no masses.  Extremities: No peripheral edema, warm and well perfused.  Skin: No rashes, bruising, or jaundice.  Neuro: Alert, oriented, symmetric facial expressions, moving extremities equally  Psych: Pleasant affect.         Data:   BMP  Recent Labs   Lab 02/23/19  0332 02/22/19  2149 02/22/19  1609 02/22/19  0316  02/21/19  1420     --  135 134  --  136   POTASSIUM 3.4 4.0 3.4 4.0   < > 3.6   CHLORIDE 94  --  99 96  --  100   RADAMES 8.8  --  7.7* 8.8  --  8.2*   CO2 31  --  28 29  --  26   BUN 21  --  17 18  --  18   CR 0.88  --  0.87 0.96  --  0.88   GLC 97  --  86 99  --  85    < > = values in this interval not displayed.     LFTs  Recent Labs   Lab 02/17/19  1627   ALKPHOS 146   AST 19   ALT 20   BILITOTAL 2.0*   PROTTOTAL 6.8   ALBUMIN 3.5      CBC  Recent Labs   Lab 02/23/19  0332 02/22/19  0316 02/21/19 0315 02/20/19 2014   WBC 6.5 7.4 7.7 8.4   RBC 4.51 4.03* 4.24* 4.38*   HGB 13.4 12.1* 12.7* 13.2*   HCT 41.2 37.3* 38.8* 40.0   MCV 91 93 92 91   MCH 29.7 30.0 30.0 30.1   MCHC 32.5 32.4 32.7 33.0   RDW 13.4 13.4 13.6 13.6   * 105* 119* 128*     INR  Recent Labs   Lab 02/21/19 0315 02/20/19 2014 02/17/19  1627   INR 1.33* 1.35* 1.47*

## 2019-02-24 ENCOUNTER — APPOINTMENT (OUTPATIENT)
Dept: GENERAL RADIOLOGY | Facility: CLINIC | Age: 54
DRG: 001 | End: 2019-02-24
Attending: STUDENT IN AN ORGANIZED HEALTH CARE EDUCATION/TRAINING PROGRAM
Payer: COMMERCIAL

## 2019-02-24 ENCOUNTER — RECORDS - HEALTHEAST (OUTPATIENT)
Dept: ADMINISTRATIVE | Facility: OTHER | Age: 54
End: 2019-02-24

## 2019-02-24 ENCOUNTER — APPOINTMENT (OUTPATIENT)
Dept: GENERAL RADIOLOGY | Facility: CLINIC | Age: 54
DRG: 001 | End: 2019-02-24
Attending: THORACIC SURGERY (CARDIOTHORACIC VASCULAR SURGERY)
Payer: COMMERCIAL

## 2019-02-24 ENCOUNTER — APPOINTMENT (OUTPATIENT)
Dept: GENERAL RADIOLOGY | Facility: CLINIC | Age: 54
DRG: 001 | End: 2019-02-24
Attending: PHYSICIAN ASSISTANT
Payer: COMMERCIAL

## 2019-02-24 LAB
ALBUMIN SERPL-MCNC: 2.4 G/DL (ref 3.4–5)
ALP SERPL-CCNC: 96 U/L (ref 40–150)
ALT SERPL W P-5'-P-CCNC: 24 U/L (ref 0–70)
ANGLE RATE OF CLOT STRENGTH: 66.1 DEGREES (ref 53–72)
ANGLE RATE OF CLOT STRENGTH: 68.2 DEGREES (ref 53–72)
ANGLE RATE OF CLOT STRENGTH: 68.3 DEGREES (ref 53–72)
ANGLE RATE OF CLOT STRENGTH: 70.7 DEGREES (ref 53–72)
ANION GAP SERPL CALCULATED.3IONS-SCNC: 13 MMOL/L (ref 3–14)
ANION GAP SERPL CALCULATED.3IONS-SCNC: 15 MMOL/L (ref 3–14)
APTT PPP: 38 SEC (ref 22–37)
APTT PPP: 55 SEC (ref 22–37)
AST SERPL W P-5'-P-CCNC: 92 U/L (ref 0–45)
BASE DEFICIT BLDA-SCNC: 1.6 MMOL/L
BASE DEFICIT BLDA-SCNC: 1.7 MMOL/L
BASE DEFICIT BLDA-SCNC: 2.1 MMOL/L
BASE DEFICIT BLDA-SCNC: 2.4 MMOL/L
BASE DEFICIT BLDA-SCNC: 2.5 MMOL/L
BASE DEFICIT BLDA-SCNC: 4.5 MMOL/L
BASE DEFICIT BLDV-SCNC: 0.9 MMOL/L
BASE DEFICIT BLDV-SCNC: 1.1 MMOL/L
BASOPHILS # BLD AUTO: 0 10E9/L (ref 0–0.2)
BASOPHILS NFR BLD AUTO: 0 %
BILIRUB SERPL-MCNC: 2.7 MG/DL (ref 0.2–1.3)
BLD PROD TYP BPU: NORMAL
BLD UNIT ID BPU: 0
BLOOD PRODUCT CODE: NORMAL
BPU ID: NORMAL
BUN SERPL-MCNC: 25 MG/DL (ref 7–30)
BUN SERPL-MCNC: 27 MG/DL (ref 7–30)
BURR CELLS BLD QL SMEAR: SLIGHT
CA-I BLD-MCNC: 4.3 MG/DL (ref 4.4–5.2)
CA-I BLD-MCNC: 4.5 MG/DL (ref 4.4–5.2)
CA-I BLD-MCNC: 4.5 MG/DL (ref 4.4–5.2)
CA-I BLD-MCNC: 4.8 MG/DL (ref 4.4–5.2)
CA-I BLD-MCNC: 4.9 MG/DL (ref 4.4–5.2)
CA-I BLD-MCNC: 5 MG/DL (ref 4.4–5.2)
CALCIUM SERPL-MCNC: 8.4 MG/DL (ref 8.5–10.1)
CALCIUM SERPL-MCNC: 9.1 MG/DL (ref 8.5–10.1)
CHLORIDE SERPL-SCNC: 106 MMOL/L (ref 94–109)
CHLORIDE SERPL-SCNC: 95 MMOL/L (ref 94–109)
CI HYPERCOAGULATION INDEX: 2.2 RATIO (ref 0–3)
CI HYPOCOAGULATION INDEX: 0.5 RATIO (ref 0–3)
CI HYPOCOAGULATION INDEX: 0.5 RATIO (ref 0–3)
CI HYPOCOAGULATION INDEX: 1.6 RATIO (ref 0–3)
CMV IGG SERPL QL IA: >8 AI (ref 0–0.8)
CO2 SERPL-SCNC: 23 MMOL/L (ref 20–32)
CO2 SERPL-SCNC: 27 MMOL/L (ref 20–32)
CREAT SERPL-MCNC: 0.9 MG/DL (ref 0.66–1.25)
CREAT SERPL-MCNC: 1.06 MG/DL (ref 0.66–1.25)
DIFFERENTIAL METHOD BLD: ABNORMAL
EBV VCA IGG SER QL IA: >8 AI (ref 0–0.8)
EOSINOPHIL # BLD AUTO: 0 10E9/L (ref 0–0.7)
EOSINOPHIL NFR BLD AUTO: 0 %
ERYTHROCYTE [DISTWIDTH] IN BLOOD BY AUTOMATED COUNT: 13.3 % (ref 10–15)
ERYTHROCYTE [DISTWIDTH] IN BLOOD BY AUTOMATED COUNT: 13.6 % (ref 10–15)
FIBRINOGEN PPP-MCNC: 382 MG/DL (ref 200–420)
FIBRINOGEN PPP-MCNC: 400 MG/DL (ref 200–420)
G ACTUAL CLOT STRENGTH: 11.2 KD/SC (ref 4.5–11)
G ACTUAL CLOT STRENGTH: 7.2 KD/SC (ref 4.5–11)
G ACTUAL CLOT STRENGTH: 8.3 KD/SC (ref 4.5–11)
G ACTUAL CLOT STRENGTH: 8.4 KD/SC (ref 4.5–11)
GFR SERPL CREATININE-BSD FRML MDRD: 78 ML/MIN/{1.73_M2}
GFR SERPL CREATININE-BSD FRML MDRD: >90 ML/MIN/{1.73_M2}
GLUCOSE BLD-MCNC: 219 MG/DL (ref 70–99)
GLUCOSE BLD-MCNC: 263 MG/DL (ref 70–99)
GLUCOSE BLD-MCNC: 313 MG/DL (ref 70–99)
GLUCOSE BLD-MCNC: 339 MG/DL (ref 70–99)
GLUCOSE BLDC GLUCOMTR-MCNC: 123 MG/DL (ref 70–99)
GLUCOSE BLDC GLUCOMTR-MCNC: 220 MG/DL (ref 70–99)
GLUCOSE SERPL-MCNC: 109 MG/DL (ref 70–99)
GLUCOSE SERPL-MCNC: 130 MG/DL (ref 70–99)
HCO3 BLD-SCNC: 22 MMOL/L (ref 21–28)
HCO3 BLD-SCNC: 24 MMOL/L (ref 21–28)
HCO3 BLD-SCNC: 24 MMOL/L (ref 21–28)
HCO3 BLD-SCNC: 25 MMOL/L (ref 21–28)
HCO3 BLDV-SCNC: 26 MMOL/L (ref 21–28)
HCO3 BLDV-SCNC: 27 MMOL/L (ref 21–28)
HCT VFR BLD AUTO: 36.7 % (ref 40–53)
HCT VFR BLD AUTO: 44.9 % (ref 40–53)
HGB BLD-MCNC: 10.7 G/DL (ref 13.3–17.7)
HGB BLD-MCNC: 11.9 G/DL (ref 13.3–17.7)
HGB BLD-MCNC: 12 G/DL (ref 13.3–17.7)
HGB BLD-MCNC: 12.5 G/DL (ref 13.3–17.7)
HGB BLD-MCNC: 13.6 G/DL (ref 13.3–17.7)
HGB BLD-MCNC: 14.1 G/DL (ref 13.3–17.7)
HGB BLD-MCNC: 14.9 G/DL (ref 13.3–17.7)
INR PPP: 1.19 (ref 0.86–1.14)
INR PPP: 1.5 (ref 0.86–1.14)
INR PPP: 1.57 (ref 0.86–1.14)
K TIME TO SPEC CLOT STRENGTH: 1.3 MINUTE (ref 1–3)
K TIME TO SPEC CLOT STRENGTH: 1.4 MINUTE (ref 1–3)
K TIME TO SPEC CLOT STRENGTH: 1.6 MINUTE (ref 1–3)
K TIME TO SPEC CLOT STRENGTH: 1.7 MINUTE (ref 1–3)
LACTATE BLD-SCNC: 2.3 MMOL/L (ref 0.7–2)
LACTATE BLD-SCNC: 3 MMOL/L (ref 0.7–2)
LACTATE BLD-SCNC: 4.7 MMOL/L (ref 0.7–2)
LACTATE BLD-SCNC: 5 MMOL/L (ref 0.7–2)
LACTATE BLD-SCNC: 5.5 MMOL/L (ref 0.7–2)
LACTATE BLD-SCNC: 6.1 MMOL/L (ref 0.7–2)
LDH SERPL L TO P-CCNC: 536 U/L (ref 85–227)
LY30 LYSIS AT 30 MINUTES: 0.2 % (ref 0–8)
LY30 LYSIS AT 30 MINUTES: 0.4 % (ref 0–8)
LY30 LYSIS AT 30 MINUTES: 0.7 % (ref 0–8)
LY30 LYSIS AT 30 MINUTES: 1 % (ref 0–8)
LY60 LYSIS AT 60 MINUTES: 2.4 % (ref 0–15)
LY60 LYSIS AT 60 MINUTES: 2.8 % (ref 0–15)
LY60 LYSIS AT 60 MINUTES: 3 % (ref 0–15)
LY60 LYSIS AT 60 MINUTES: 3.1 % (ref 0–15)
LYMPHOCYTES # BLD AUTO: 0 10E9/L (ref 0.8–5.3)
LYMPHOCYTES NFR BLD AUTO: 0 %
MA MAXIMUM CLOT STRENGTH: 59.1 MM (ref 50–70)
MA MAXIMUM CLOT STRENGTH: 62.5 MM (ref 50–70)
MA MAXIMUM CLOT STRENGTH: 62.6 MM (ref 50–70)
MA MAXIMUM CLOT STRENGTH: 69.2 MM (ref 50–70)
MAGNESIUM SERPL-MCNC: 3 MG/DL (ref 1.6–2.3)
MAGNESIUM SERPL-MCNC: 3.1 MG/DL (ref 1.6–2.3)
MCH RBC QN AUTO: 29.9 PG (ref 26.5–33)
MCH RBC QN AUTO: 30.5 PG (ref 26.5–33)
MCHC RBC AUTO-ENTMCNC: 32.7 G/DL (ref 31.5–36.5)
MCHC RBC AUTO-ENTMCNC: 33.2 G/DL (ref 31.5–36.5)
MCV RBC AUTO: 90 FL (ref 78–100)
MCV RBC AUTO: 93 FL (ref 78–100)
MONOCYTES # BLD AUTO: 0.7 10E9/L (ref 0–1.3)
MONOCYTES NFR BLD AUTO: 3.5 %
NEUTROPHILS # BLD AUTO: 19.2 10E9/L (ref 1.6–8.3)
NEUTROPHILS NFR BLD AUTO: 96.5 %
NUM BPU REQUESTED: 2
NUM BPU REQUESTED: 4
O2/TOTAL GAS SETTING VFR VENT: 100 %
O2/TOTAL GAS SETTING VFR VENT: 100 %
O2/TOTAL GAS SETTING VFR VENT: 40 %
O2/TOTAL GAS SETTING VFR VENT: 40 %
O2/TOTAL GAS SETTING VFR VENT: 80 %
O2/TOTAL GAS SETTING VFR VENT: 83 %
OXYHGB MFR BLD: 98 % (ref 92–100)
OXYHGB MFR BLD: 98 % (ref 92–100)
OXYHGB MFR BLDV: 67 %
OXYHGB MFR BLDV: 71 %
PCO2 BLD: 43 MM HG (ref 35–45)
PCO2 BLD: 46 MM HG (ref 35–45)
PCO2 BLD: 49 MM HG (ref 35–45)
PCO2 BLD: 50 MM HG (ref 35–45)
PCO2 BLDV: 55 MM HG (ref 40–50)
PCO2 BLDV: 59 MM HG (ref 40–50)
PH BLD: 7.3 PH (ref 7.35–7.45)
PH BLD: 7.31 PH (ref 7.35–7.45)
PH BLD: 7.31 PH (ref 7.35–7.45)
PH BLD: 7.32 PH (ref 7.35–7.45)
PH BLD: 7.33 PH (ref 7.35–7.45)
PH BLD: 7.33 PH (ref 7.35–7.45)
PH BLDV: 7.27 PH (ref 7.32–7.43)
PH BLDV: 7.29 PH (ref 7.32–7.43)
PHOSPHATE SERPL-MCNC: 2.7 MG/DL (ref 2.5–4.5)
PLATELET # BLD AUTO: 132 10E9/L (ref 150–450)
PLATELET # BLD AUTO: 158 10E9/L (ref 150–450)
PLATELET # BLD AUTO: 92 10E9/L (ref 150–450)
PLATELET # BLD EST: ABNORMAL 10*3/UL
PO2 BLD: 105 MM HG (ref 80–105)
PO2 BLD: 125 MM HG (ref 80–105)
PO2 BLD: 178 MM HG (ref 80–105)
PO2 BLD: 188 MM HG (ref 80–105)
PO2 BLD: 232 MM HG (ref 80–105)
PO2 BLD: 444 MM HG (ref 80–105)
PO2 BLDV: 43 MM HG (ref 25–47)
PO2 BLDV: 47 MM HG (ref 25–47)
POIKILOCYTOSIS BLD QL SMEAR: SLIGHT
POTASSIUM BLD-SCNC: 2.9 MMOL/L (ref 3.4–5.3)
POTASSIUM BLD-SCNC: 3 MMOL/L (ref 3.4–5.3)
POTASSIUM BLD-SCNC: 3.4 MMOL/L (ref 3.4–5.3)
POTASSIUM BLD-SCNC: 3.5 MMOL/L (ref 3.4–5.3)
POTASSIUM BLD-SCNC: 4 MMOL/L (ref 3.4–5.3)
POTASSIUM SERPL-SCNC: 3.3 MMOL/L (ref 3.4–5.3)
POTASSIUM SERPL-SCNC: 3.7 MMOL/L (ref 3.4–5.3)
PROT SERPL-MCNC: 5.3 G/DL (ref 6.8–8.8)
R TIME UNTIL CLOT FORMS: 5.1 MINUTE (ref 5–10)
R TIME UNTIL CLOT FORMS: 7.6 MINUTE (ref 5–10)
R TIME UNTIL CLOT FORMS: 7.8 MINUTE (ref 5–10)
R TIME UNTIL CLOT FORMS: 8.3 MINUTE (ref 5–10)
RADIOLOGIST FLAGS: ABNORMAL
RBC # BLD AUTO: 3.94 10E12/L (ref 4.4–5.9)
RBC # BLD AUTO: 4.98 10E12/L (ref 4.4–5.9)
SODIUM BLD-SCNC: 136 MMOL/L (ref 133–144)
SODIUM BLD-SCNC: 137 MMOL/L (ref 133–144)
SODIUM BLD-SCNC: 139 MMOL/L (ref 133–144)
SODIUM BLD-SCNC: 141 MMOL/L (ref 133–144)
SODIUM SERPL-SCNC: 135 MMOL/L (ref 133–144)
SODIUM SERPL-SCNC: 144 MMOL/L (ref 133–144)
TRANSFUSION STATUS PATIENT QL: NORMAL
WBC # BLD AUTO: 19.9 10E9/L (ref 4–11)
WBC # BLD AUTO: 7.2 10E9/L (ref 4–11)

## 2019-02-24 PROCEDURE — 25000128 H RX IP 250 OP 636: Performed by: NURSE ANESTHETIST, CERTIFIED REGISTERED

## 2019-02-24 PROCEDURE — 93503 INSERT/PLACE HEART CATHETER: CPT

## 2019-02-24 PROCEDURE — 85610 PROTHROMBIN TIME: CPT | Performed by: THORACIC SURGERY (CARDIOTHORACIC VASCULAR SURGERY)

## 2019-02-24 PROCEDURE — 85396 CLOTTING ASSAY WHOLE BLOOD: CPT | Performed by: INTERNAL MEDICINE

## 2019-02-24 PROCEDURE — 40000556 ZZH STATISTIC PERIPHERAL IV START W US GUIDANCE

## 2019-02-24 PROCEDURE — 82805 BLOOD GASES W/O2 SATURATION: CPT | Performed by: PHYSICIAN ASSISTANT

## 2019-02-24 PROCEDURE — 25800030 ZZH RX IP 258 OP 636: Performed by: NURSE ANESTHETIST, CERTIFIED REGISTERED

## 2019-02-24 PROCEDURE — 85396 CLOTTING ASSAY WHOLE BLOOD: CPT | Performed by: NURSE ANESTHETIST, CERTIFIED REGISTERED

## 2019-02-24 PROCEDURE — 25000125 ZZHC RX 250: Performed by: NURSE ANESTHETIST, CERTIFIED REGISTERED

## 2019-02-24 PROCEDURE — 83735 ASSAY OF MAGNESIUM: CPT | Performed by: STUDENT IN AN ORGANIZED HEALTH CARE EDUCATION/TRAINING PROGRAM

## 2019-02-24 PROCEDURE — 02YA0Z0 TRANSPLANTATION OF HEART, ALLOGENEIC, OPEN APPROACH: ICD-10-PCS | Performed by: THORACIC SURGERY (CARDIOTHORACIC VASCULAR SURGERY)

## 2019-02-24 PROCEDURE — 25800030 ZZH RX IP 258 OP 636: Performed by: STUDENT IN AN ORGANIZED HEALTH CARE EDUCATION/TRAINING PROGRAM

## 2019-02-24 PROCEDURE — 40000014 ZZH STATISTIC ARTERIAL MONITORING DAILY

## 2019-02-24 PROCEDURE — C1768 GRAFT, VASCULAR: HCPCS | Performed by: THORACIC SURGERY (CARDIOTHORACIC VASCULAR SURGERY)

## 2019-02-24 PROCEDURE — 83605 ASSAY OF LACTIC ACID: CPT

## 2019-02-24 PROCEDURE — 36000074 ZZH SURGERY LEVEL 6 1ST 30 MIN - UMMC: Performed by: THORACIC SURGERY (CARDIOTHORACIC VASCULAR SURGERY)

## 2019-02-24 PROCEDURE — 84132 ASSAY OF SERUM POTASSIUM: CPT | Performed by: THORACIC SURGERY (CARDIOTHORACIC VASCULAR SURGERY)

## 2019-02-24 PROCEDURE — 88305 TISSUE EXAM BY PATHOLOGIST: CPT | Performed by: THORACIC SURGERY (CARDIOTHORACIC VASCULAR SURGERY)

## 2019-02-24 PROCEDURE — 25000128 H RX IP 250 OP 636: Performed by: STUDENT IN AN ORGANIZED HEALTH CARE EDUCATION/TRAINING PROGRAM

## 2019-02-24 PROCEDURE — 85025 COMPLETE CBC W/AUTO DIFF WBC: CPT | Performed by: THORACIC SURGERY (CARDIOTHORACIC VASCULAR SURGERY)

## 2019-02-24 PROCEDURE — 83615 LACTATE (LD) (LDH) ENZYME: CPT | Performed by: THORACIC SURGERY (CARDIOTHORACIC VASCULAR SURGERY)

## 2019-02-24 PROCEDURE — 83605 ASSAY OF LACTIC ACID: CPT | Performed by: THORACIC SURGERY (CARDIOTHORACIC VASCULAR SURGERY)

## 2019-02-24 PROCEDURE — P9059 PLASMA, FRZ BETWEEN 8-24HOUR: HCPCS | Performed by: INTERNAL MEDICINE

## 2019-02-24 PROCEDURE — 99233 SBSQ HOSP IP/OBS HIGH 50: CPT | Mod: 25 | Performed by: INTERNAL MEDICINE

## 2019-02-24 PROCEDURE — 85384 FIBRINOGEN ACTIVITY: CPT | Performed by: THORACIC SURGERY (CARDIOTHORACIC VASCULAR SURGERY)

## 2019-02-24 PROCEDURE — 25000125 ZZHC RX 250: Performed by: THORACIC SURGERY (CARDIOTHORACIC VASCULAR SURGERY)

## 2019-02-24 PROCEDURE — 82805 BLOOD GASES W/O2 SATURATION: CPT | Performed by: THORACIC SURGERY (CARDIOTHORACIC VASCULAR SURGERY)

## 2019-02-24 PROCEDURE — 40000281 ZZH STATISTIC TRANSPORT TIME EA 15 MIN

## 2019-02-24 PROCEDURE — 0JPT0PZ REMOVAL OF CARDIAC RHYTHM RELATED DEVICE FROM TRUNK SUBCUTANEOUS TISSUE AND FASCIA, OPEN APPROACH: ICD-10-PCS | Performed by: THORACIC SURGERY (CARDIOTHORACIC VASCULAR SURGERY)

## 2019-02-24 PROCEDURE — 37000009 ZZH ANESTHESIA TECHNICAL FEE, EACH ADDTL 15 MIN: Performed by: THORACIC SURGERY (CARDIOTHORACIC VASCULAR SURGERY)

## 2019-02-24 PROCEDURE — 85018 HEMOGLOBIN: CPT | Performed by: INTERNAL MEDICINE

## 2019-02-24 PROCEDURE — 37000008 ZZH ANESTHESIA TECHNICAL FEE, 1ST 30 MIN: Performed by: THORACIC SURGERY (CARDIOTHORACIC VASCULAR SURGERY)

## 2019-02-24 PROCEDURE — 82330 ASSAY OF CALCIUM: CPT | Performed by: THORACIC SURGERY (CARDIOTHORACIC VASCULAR SURGERY)

## 2019-02-24 PROCEDURE — P9037 PLATE PHERES LEUKOREDU IRRAD: HCPCS | Performed by: INTERNAL MEDICINE

## 2019-02-24 PROCEDURE — P9041 ALBUMIN (HUMAN),5%, 50ML: HCPCS

## 2019-02-24 PROCEDURE — P9016 RBC LEUKOCYTES REDUCED: HCPCS | Performed by: INTERNAL MEDICINE

## 2019-02-24 PROCEDURE — 25000565 ZZH ISOFLURANE, EA 15 MIN: Performed by: THORACIC SURGERY (CARDIOTHORACIC VASCULAR SURGERY)

## 2019-02-24 PROCEDURE — 40000076 ZZH STATISTIC IABP MONITORING

## 2019-02-24 PROCEDURE — 40000275 ZZH STATISTIC RCP TIME EA 10 MIN

## 2019-02-24 PROCEDURE — 85730 THROMBOPLASTIN TIME PARTIAL: CPT | Performed by: THORACIC SURGERY (CARDIOTHORACIC VASCULAR SURGERY)

## 2019-02-24 PROCEDURE — 25800030 ZZH RX IP 258 OP 636

## 2019-02-24 PROCEDURE — 02PA0MZ REMOVAL OF CARDIAC LEAD FROM HEART, OPEN APPROACH: ICD-10-PCS | Performed by: THORACIC SURGERY (CARDIOTHORACIC VASCULAR SURGERY)

## 2019-02-24 PROCEDURE — 85027 COMPLETE CBC AUTOMATED: CPT | Performed by: STUDENT IN AN ORGANIZED HEALTH CARE EDUCATION/TRAINING PROGRAM

## 2019-02-24 PROCEDURE — 25000125 ZZHC RX 250: Performed by: STUDENT IN AN ORGANIZED HEALTH CARE EDUCATION/TRAINING PROGRAM

## 2019-02-24 PROCEDURE — 36000076 ZZH SURGERY LEVEL 6 EA 15 ADDTL MIN - UMMC: Performed by: THORACIC SURGERY (CARDIOTHORACIC VASCULAR SURGERY)

## 2019-02-24 PROCEDURE — 84295 ASSAY OF SERUM SODIUM: CPT

## 2019-02-24 PROCEDURE — 85384 FIBRINOGEN ACTIVITY: CPT | Performed by: INTERNAL MEDICINE

## 2019-02-24 PROCEDURE — 27210995 ZZH RX 272: Performed by: THORACIC SURGERY (CARDIOTHORACIC VASCULAR SURGERY)

## 2019-02-24 PROCEDURE — 82330 ASSAY OF CALCIUM: CPT

## 2019-02-24 PROCEDURE — 85049 AUTOMATED PLATELET COUNT: CPT | Performed by: INTERNAL MEDICINE

## 2019-02-24 PROCEDURE — 85610 PROTHROMBIN TIME: CPT | Performed by: INTERNAL MEDICINE

## 2019-02-24 PROCEDURE — 25000128 H RX IP 250 OP 636: Performed by: THORACIC SURGERY (CARDIOTHORACIC VASCULAR SURGERY)

## 2019-02-24 PROCEDURE — 20000004 ZZH R&B ICU UMMC

## 2019-02-24 PROCEDURE — 82330 ASSAY OF CALCIUM: CPT | Performed by: INTERNAL MEDICINE

## 2019-02-24 PROCEDURE — 88311 DECALCIFY TISSUE: CPT | Performed by: THORACIC SURGERY (CARDIOTHORACIC VASCULAR SURGERY)

## 2019-02-24 PROCEDURE — 25000131 ZZH RX MED GY IP 250 OP 636 PS 637: Performed by: STUDENT IN AN ORGANIZED HEALTH CARE EDUCATION/TRAINING PROGRAM

## 2019-02-24 PROCEDURE — 84132 ASSAY OF SERUM POTASSIUM: CPT | Performed by: INTERNAL MEDICINE

## 2019-02-24 PROCEDURE — 83605 ASSAY OF LACTIC ACID: CPT | Performed by: INTERNAL MEDICINE

## 2019-02-24 PROCEDURE — 41000019 ZZH PERA-PERFUSION EACH ADDTL 15 MIN: Performed by: THORACIC SURGERY (CARDIOTHORACIC VASCULAR SURGERY)

## 2019-02-24 PROCEDURE — 27210447 ZZH PACK CELL SAVER CSP: Performed by: THORACIC SURGERY (CARDIOTHORACIC VASCULAR SURGERY)

## 2019-02-24 PROCEDURE — 81200010 ZZH ACQUISITION HEART CADAVER

## 2019-02-24 PROCEDURE — 82805 BLOOD GASES W/O2 SATURATION: CPT | Performed by: INTERNAL MEDICINE

## 2019-02-24 PROCEDURE — 84100 ASSAY OF PHOSPHORUS: CPT | Performed by: THORACIC SURGERY (CARDIOTHORACIC VASCULAR SURGERY)

## 2019-02-24 PROCEDURE — 82803 BLOOD GASES ANY COMBINATION: CPT

## 2019-02-24 PROCEDURE — 80053 COMPREHEN METABOLIC PANEL: CPT | Performed by: THORACIC SURGERY (CARDIOTHORACIC VASCULAR SURGERY)

## 2019-02-24 PROCEDURE — 88309 TISSUE EXAM BY PATHOLOGIST: CPT | Performed by: THORACIC SURGERY (CARDIOTHORACIC VASCULAR SURGERY)

## 2019-02-24 PROCEDURE — 84132 ASSAY OF SERUM POTASSIUM: CPT

## 2019-02-24 PROCEDURE — 25000125 ZZHC RX 250

## 2019-02-24 PROCEDURE — 40000344 ZZHCL STATISTIC THAWING COMPONENT: Performed by: INTERNAL MEDICINE

## 2019-02-24 PROCEDURE — 82803 BLOOD GASES ANY COMBINATION: CPT | Performed by: THORACIC SURGERY (CARDIOTHORACIC VASCULAR SURGERY)

## 2019-02-24 PROCEDURE — 40000986 XR CHEST PORT 1 VW

## 2019-02-24 PROCEDURE — 83735 ASSAY OF MAGNESIUM: CPT | Performed by: THORACIC SURGERY (CARDIOTHORACIC VASCULAR SURGERY)

## 2019-02-24 PROCEDURE — 82947 ASSAY GLUCOSE BLOOD QUANT: CPT

## 2019-02-24 PROCEDURE — 25000128 H RX IP 250 OP 636: Performed by: PHYSICIAN ASSISTANT

## 2019-02-24 PROCEDURE — 80048 BASIC METABOLIC PNL TOTAL CA: CPT | Performed by: STUDENT IN AN ORGANIZED HEALTH CARE EDUCATION/TRAINING PROGRAM

## 2019-02-24 PROCEDURE — 94002 VENT MGMT INPAT INIT DAY: CPT

## 2019-02-24 PROCEDURE — 25000128 H RX IP 250 OP 636: Performed by: ANESTHESIOLOGY

## 2019-02-24 PROCEDURE — 82947 ASSAY GLUCOSE BLOOD QUANT: CPT | Performed by: THORACIC SURGERY (CARDIOTHORACIC VASCULAR SURGERY)

## 2019-02-24 PROCEDURE — 27210460 ZZH PUMP APP ADULT PERFUSION: Performed by: THORACIC SURGERY (CARDIOTHORACIC VASCULAR SURGERY)

## 2019-02-24 PROCEDURE — 84295 ASSAY OF SERUM SODIUM: CPT | Performed by: THORACIC SURGERY (CARDIOTHORACIC VASCULAR SURGERY)

## 2019-02-24 PROCEDURE — 40000986 XR ABDOMEN PORT 1 VW

## 2019-02-24 PROCEDURE — 00000146 ZZHCL STATISTIC GLUCOSE BY METER IP

## 2019-02-24 PROCEDURE — 85730 THROMBOPLASTIN TIME PARTIAL: CPT | Performed by: INTERNAL MEDICINE

## 2019-02-24 PROCEDURE — 27210794 ZZH OR GENERAL SUPPLY STERILE: Performed by: THORACIC SURGERY (CARDIOTHORACIC VASCULAR SURGERY)

## 2019-02-24 PROCEDURE — 41000018 ZZH PER-PERFUSION 1ST 30 MIN: Performed by: THORACIC SURGERY (CARDIOTHORACIC VASCULAR SURGERY)

## 2019-02-24 PROCEDURE — 5A1221Z PERFORMANCE OF CARDIAC OUTPUT, CONTINUOUS: ICD-10-PCS | Performed by: THORACIC SURGERY (CARDIOTHORACIC VASCULAR SURGERY)

## 2019-02-24 PROCEDURE — 71045 X-RAY EXAM CHEST 1 VIEW: CPT

## 2019-02-24 PROCEDURE — 25000128 H RX IP 250 OP 636

## 2019-02-24 PROCEDURE — 85610 PROTHROMBIN TIME: CPT | Performed by: STUDENT IN AN ORGANIZED HEALTH CARE EDUCATION/TRAINING PROGRAM

## 2019-02-24 DEVICE — IMPLANTABLE DEVICE: Type: IMPLANTABLE DEVICE | Site: HEART | Status: FUNCTIONAL

## 2019-02-24 RX ORDER — KETAMINE HCL IN 0.9 % NACL 20 MG/2 ML
50 SYRINGE (ML) INTRAVENOUS
Status: DISCONTINUED | OUTPATIENT
Start: 2019-02-24 | End: 2019-02-24

## 2019-02-24 RX ORDER — FENTANYL CITRATE 50 UG/ML
INJECTION, SOLUTION INTRAMUSCULAR; INTRAVENOUS PRN
Status: DISCONTINUED | OUTPATIENT
Start: 2019-02-24 | End: 2019-02-24

## 2019-02-24 RX ORDER — ONDANSETRON 2 MG/ML
INJECTION INTRAMUSCULAR; INTRAVENOUS PRN
Status: DISCONTINUED | OUTPATIENT
Start: 2019-02-24 | End: 2019-02-24

## 2019-02-24 RX ORDER — MAGNESIUM HYDROXIDE 1200 MG/15ML
LIQUID ORAL PRN
Status: DISCONTINUED | OUTPATIENT
Start: 2019-02-24 | End: 2019-02-24 | Stop reason: HOSPADM

## 2019-02-24 RX ORDER — PIPERACILLIN SODIUM, TAZOBACTAM SODIUM 3; .375 G/15ML; G/15ML
INJECTION, POWDER, LYOPHILIZED, FOR SOLUTION INTRAVENOUS PRN
Status: DISCONTINUED | OUTPATIENT
Start: 2019-02-24 | End: 2019-02-24

## 2019-02-24 RX ORDER — KETAMINE HCL IN 0.9 % NACL 20 MG/2 ML
50 SYRINGE (ML) INTRAVENOUS
Status: DISPENSED | OUTPATIENT
Start: 2019-02-24 | End: 2019-02-24

## 2019-02-24 RX ORDER — NICOTINE POLACRILEX 4 MG
15-30 LOZENGE BUCCAL
Status: DISCONTINUED | OUTPATIENT
Start: 2019-02-24 | End: 2019-02-25

## 2019-02-24 RX ORDER — PROPOFOL 10 MG/ML
INJECTION, EMULSION INTRAVENOUS CONTINUOUS PRN
Status: DISCONTINUED | OUTPATIENT
Start: 2019-02-24 | End: 2019-02-24

## 2019-02-24 RX ORDER — LIDOCAINE HYDROCHLORIDE 20 MG/ML
INJECTION, SOLUTION INFILTRATION; PERINEURAL PRN
Status: DISCONTINUED | OUTPATIENT
Start: 2019-02-24 | End: 2019-02-24

## 2019-02-24 RX ORDER — SODIUM CHLORIDE, SODIUM GLUCONATE, SODIUM ACETATE, POTASSIUM CHLORIDE AND MAGNESIUM CHLORIDE 526; 502; 368; 37; 30 MG/100ML; MG/100ML; MG/100ML; MG/100ML; MG/100ML
INJECTION, SOLUTION INTRAVENOUS CONTINUOUS PRN
Status: DISCONTINUED | OUTPATIENT
Start: 2019-02-24 | End: 2019-02-24

## 2019-02-24 RX ORDER — ETOMIDATE 2 MG/ML
INJECTION INTRAVENOUS PRN
Status: DISCONTINUED | OUTPATIENT
Start: 2019-02-24 | End: 2019-02-24

## 2019-02-24 RX ORDER — NALOXONE HYDROCHLORIDE 0.4 MG/ML
.1-.4 INJECTION, SOLUTION INTRAMUSCULAR; INTRAVENOUS; SUBCUTANEOUS
Status: DISCONTINUED | OUTPATIENT
Start: 2019-02-24 | End: 2019-02-27

## 2019-02-24 RX ORDER — PROPOFOL 10 MG/ML
5-75 INJECTION, EMULSION INTRAVENOUS CONTINUOUS
Status: DISCONTINUED | OUTPATIENT
Start: 2019-02-24 | End: 2019-02-25

## 2019-02-24 RX ORDER — SODIUM CHLORIDE, SODIUM LACTATE, POTASSIUM CHLORIDE, CALCIUM CHLORIDE 600; 310; 30; 20 MG/100ML; MG/100ML; MG/100ML; MG/100ML
INJECTION, SOLUTION INTRAVENOUS CONTINUOUS PRN
Status: DISCONTINUED | OUTPATIENT
Start: 2019-02-24 | End: 2019-02-24

## 2019-02-24 RX ORDER — NOREPINEPHRINE BITARTRATE/D5W 16MG/250ML
0.03-0.4 PLASTIC BAG, INJECTION (ML) INTRAVENOUS CONTINUOUS
Status: DISCONTINUED | OUTPATIENT
Start: 2019-02-24 | End: 2019-02-25

## 2019-02-24 RX ORDER — HEPARIN SODIUM 1000 [USP'U]/ML
INJECTION, SOLUTION INTRAVENOUS; SUBCUTANEOUS PRN
Status: DISCONTINUED | OUTPATIENT
Start: 2019-02-24 | End: 2019-02-24

## 2019-02-24 RX ORDER — NOREPINEPHRINE BITARTRATE/D5W 16MG/250ML
0.03-0.4 PLASTIC BAG, INJECTION (ML) INTRAVENOUS CONTINUOUS
Status: DISCONTINUED | OUTPATIENT
Start: 2019-02-24 | End: 2019-02-24

## 2019-02-24 RX ORDER — DEXTROSE MONOHYDRATE 25 G/50ML
25-50 INJECTION, SOLUTION INTRAVENOUS
Status: DISCONTINUED | OUTPATIENT
Start: 2019-02-24 | End: 2019-02-25

## 2019-02-24 RX ORDER — LIDOCAINE 40 MG/G
CREAM TOPICAL
Status: DISCONTINUED | OUTPATIENT
Start: 2019-02-24 | End: 2019-03-04 | Stop reason: HOSPADM

## 2019-02-24 RX ORDER — KETAMINE HYDROCHLORIDE 10 MG/ML
INJECTION, SOLUTION INTRAMUSCULAR; INTRAVENOUS PRN
Status: DISCONTINUED | OUTPATIENT
Start: 2019-02-24 | End: 2019-02-24

## 2019-02-24 RX ORDER — VANCOMYCIN HYDROCHLORIDE 1 G/200ML
INJECTION, SOLUTION INTRAVENOUS PRN
Status: DISCONTINUED | OUTPATIENT
Start: 2019-02-24 | End: 2019-02-24

## 2019-02-24 RX ORDER — CEFAZOLIN SODIUM 1 G/3ML
1 INJECTION, POWDER, FOR SOLUTION INTRAMUSCULAR; INTRAVENOUS EVERY 8 HOURS
Status: DISCONTINUED | OUTPATIENT
Start: 2019-02-24 | End: 2019-02-25

## 2019-02-24 RX ORDER — METHYLPREDNISOLONE SODIUM SUCCINATE 125 MG/2ML
125 INJECTION, POWDER, LYOPHILIZED, FOR SOLUTION INTRAMUSCULAR; INTRAVENOUS EVERY 8 HOURS
Status: COMPLETED | OUTPATIENT
Start: 2019-02-24 | End: 2019-02-25

## 2019-02-24 RX ORDER — CALCIUM CHLORIDE 100 MG/ML
INJECTION INTRAVENOUS; INTRAVENTRICULAR PRN
Status: DISCONTINUED | OUTPATIENT
Start: 2019-02-24 | End: 2019-02-24

## 2019-02-24 RX ORDER — HEPARIN SODIUM,PORCINE 10 UNIT/ML
5 VIAL (ML) INTRAVENOUS EVERY 24 HOURS
Status: DISCONTINUED | OUTPATIENT
Start: 2019-02-24 | End: 2019-03-14

## 2019-02-24 RX ORDER — HEPARIN SODIUM,PORCINE 10 UNIT/ML
5-10 VIAL (ML) INTRAVENOUS
Status: DISCONTINUED | OUTPATIENT
Start: 2019-02-24 | End: 2019-03-14

## 2019-02-24 RX ORDER — POTASSIUM CHLORIDE 1.5 G/1.58G
40 POWDER, FOR SOLUTION ORAL DAILY
Status: DISCONTINUED | OUTPATIENT
Start: 2019-02-24 | End: 2019-02-26

## 2019-02-24 RX ADMIN — NOREPINEPHRINE BITARTRATE 6.4 MCG: 1 INJECTION INTRAVENOUS at 12:39

## 2019-02-24 RX ADMIN — FENTANYL CITRATE 100 MCG: 50 INJECTION, SOLUTION INTRAMUSCULAR; INTRAVENOUS at 17:00

## 2019-02-24 RX ADMIN — LIDOCAINE HYDROCHLORIDE 60 MG: 20 INJECTION, SOLUTION INFILTRATION; PERINEURAL at 08:31

## 2019-02-24 RX ADMIN — KETAMINE HYDROCHLORIDE 15 MG: 10 INJECTION, SOLUTION INTRAMUSCULAR; INTRAVENOUS at 12:56

## 2019-02-24 RX ADMIN — METHYLPREDNISOLONE 125 MG: 125 INJECTION, POWDER, LYOPHILIZED, FOR SOLUTION INTRAMUSCULAR; INTRAVENOUS at 20:34

## 2019-02-24 RX ADMIN — FENTANYL CITRATE 100 MCG: 50 INJECTION, SOLUTION INTRAMUSCULAR; INTRAVENOUS at 09:08

## 2019-02-24 RX ADMIN — SUGAMMADEX 100 MG: 100 INJECTION, SOLUTION INTRAVENOUS at 17:54

## 2019-02-24 RX ADMIN — NOREPINEPHRINE BITARTRATE 6.4 MCG: 1 INJECTION INTRAVENOUS at 11:57

## 2019-02-24 RX ADMIN — ONDANSETRON 4 MG: 2 INJECTION INTRAMUSCULAR; INTRAVENOUS at 17:42

## 2019-02-24 RX ADMIN — NOREPINEPHRINE BITARTRATE 6.4 MCG: 1 INJECTION INTRAVENOUS at 12:01

## 2019-02-24 RX ADMIN — CEFAZOLIN 1 G: 330 INJECTION, POWDER, FOR SOLUTION INTRAMUSCULAR; INTRAVENOUS at 20:17

## 2019-02-24 RX ADMIN — POTASSIUM CHLORIDE 20 MEQ: 400 INJECTION, SOLUTION INTRAVENOUS at 17:34

## 2019-02-24 RX ADMIN — ROCURONIUM BROMIDE 10 MG: 10 INJECTION INTRAVENOUS at 12:43

## 2019-02-24 RX ADMIN — ISOPROTERENOL HYDROCHLORIDE 0.03 MCG/KG/MIN: 0.2 INJECTION, SOLUTION INTRAMUSCULAR; INTRAVENOUS at 14:51

## 2019-02-24 RX ADMIN — SODIUM CHLORIDE, POTASSIUM CHLORIDE, SODIUM LACTATE AND CALCIUM CHLORIDE: 600; 310; 30; 20 INJECTION, SOLUTION INTRAVENOUS at 08:01

## 2019-02-24 RX ADMIN — SODIUM CHLORIDE, SODIUM GLUCONATE, SODIUM ACETATE, POTASSIUM CHLORIDE AND MAGNESIUM CHLORIDE: 526; 502; 368; 37; 30 INJECTION, SOLUTION INTRAVENOUS at 08:01

## 2019-02-24 RX ADMIN — ISOPROTERENOL HYDROCHLORIDE 0.01 MCG/KG/MIN: 0.2 INJECTION, SOLUTION INTRAMUSCULAR; INTRAVENOUS at 18:38

## 2019-02-24 RX ADMIN — POTASSIUM CHLORIDE 20 MEQ: 400 INJECTION, SOLUTION INTRAVENOUS at 04:38

## 2019-02-24 RX ADMIN — POTASSIUM CHLORIDE 20 MEQ: 400 INJECTION, SOLUTION INTRAVENOUS at 16:11

## 2019-02-24 RX ADMIN — VANCOMYCIN HYDROCHLORIDE 1 G: 1 INJECTION, SOLUTION INTRAVENOUS at 10:54

## 2019-02-24 RX ADMIN — FENTANYL CITRATE 50 MCG/HR: 50 INJECTION, SOLUTION INTRAMUSCULAR; INTRAVENOUS at 20:53

## 2019-02-24 RX ADMIN — PIPERACILLIN SODIUM AND TAZOBACTAM SODIUM 2.25 G: 3; .375 INJECTION, POWDER, LYOPHILIZED, FOR SOLUTION INTRAVENOUS at 12:55

## 2019-02-24 RX ADMIN — FENTANYL CITRATE 100 MCG: 50 INJECTION, SOLUTION INTRAMUSCULAR; INTRAVENOUS at 12:56

## 2019-02-24 RX ADMIN — VASOPRESSIN 2 UNITS/HR: 20 INJECTION INTRAVENOUS at 13:03

## 2019-02-24 RX ADMIN — ROCURONIUM BROMIDE 30 MG: 10 INJECTION INTRAVENOUS at 14:32

## 2019-02-24 RX ADMIN — MIDAZOLAM 1 MG: 1 INJECTION INTRAMUSCULAR; INTRAVENOUS at 10:50

## 2019-02-24 RX ADMIN — ROCURONIUM BROMIDE 30 MG: 10 INJECTION INTRAVENOUS at 12:11

## 2019-02-24 RX ADMIN — ROCURONIUM BROMIDE 30 MG: 10 INJECTION INTRAVENOUS at 12:56

## 2019-02-24 RX ADMIN — SODIUM CHLORIDE 1000 MG: 9 INJECTION, SOLUTION INTRAVENOUS at 03:17

## 2019-02-24 RX ADMIN — NOREPINEPHRINE BITARTRATE 6.4 MCG: 1 INJECTION INTRAVENOUS at 11:45

## 2019-02-24 RX ADMIN — PROPOFOL 30 MCG/KG/MIN: 10 INJECTION, EMULSION INTRAVENOUS at 17:24

## 2019-02-24 RX ADMIN — KETAMINE HYDROCHLORIDE 20 MG: 10 INJECTION, SOLUTION INTRAMUSCULAR; INTRAVENOUS at 14:53

## 2019-02-24 RX ADMIN — CALCIUM CHLORIDE 1000 MG: 100 INJECTION, SOLUTION INTRAVENOUS at 15:57

## 2019-02-24 RX ADMIN — ROCURONIUM BROMIDE 40 MG: 10 INJECTION INTRAVENOUS at 16:06

## 2019-02-24 RX ADMIN — PHENYLEPHRINE HYDROCHLORIDE 50 MCG: 10 INJECTION, SOLUTION INTRAMUSCULAR; INTRAVENOUS; SUBCUTANEOUS at 10:56

## 2019-02-24 RX ADMIN — AMINOCAPROIC ACID 5 G: 250 INJECTION, SOLUTION INTRAVENOUS at 09:46

## 2019-02-24 RX ADMIN — FENTANYL CITRATE 150 MCG: 50 INJECTION, SOLUTION INTRAMUSCULAR; INTRAVENOUS at 16:45

## 2019-02-24 RX ADMIN — ROCURONIUM BROMIDE 40 MG: 10 INJECTION INTRAVENOUS at 09:40

## 2019-02-24 RX ADMIN — ROCURONIUM BROMIDE 30 MG: 10 INJECTION INTRAVENOUS at 10:50

## 2019-02-24 RX ADMIN — FENTANYL CITRATE 200 MCG: 50 INJECTION, SOLUTION INTRAMUSCULAR; INTRAVENOUS at 16:04

## 2019-02-24 RX ADMIN — PHENYLEPHRINE HYDROCHLORIDE 100 MCG: 10 INJECTION, SOLUTION INTRAMUSCULAR; INTRAVENOUS; SUBCUTANEOUS at 11:16

## 2019-02-24 RX ADMIN — ROCURONIUM BROMIDE 30 MG: 10 INJECTION INTRAVENOUS at 11:16

## 2019-02-24 RX ADMIN — PROPOFOL 30 MCG/KG/MIN: 10 INJECTION, EMULSION INTRAVENOUS at 20:13

## 2019-02-24 RX ADMIN — FENTANYL CITRATE 100 MCG: 50 INJECTION, SOLUTION INTRAMUSCULAR; INTRAVENOUS at 16:47

## 2019-02-24 RX ADMIN — MIDAZOLAM 1 MG: 1 INJECTION INTRAMUSCULAR; INTRAVENOUS at 16:06

## 2019-02-24 RX ADMIN — KETAMINE HYDROCHLORIDE 15 MG: 10 INJECTION, SOLUTION INTRAMUSCULAR; INTRAVENOUS at 11:55

## 2019-02-24 RX ADMIN — PHENYLEPHRINE HYDROCHLORIDE 100 MCG: 10 INJECTION, SOLUTION INTRAMUSCULAR; INTRAVENOUS; SUBCUTANEOUS at 11:06

## 2019-02-24 RX ADMIN — AMINOCAPROIC ACID 1 G/HR: 250 INJECTION, SOLUTION INTRAVENOUS at 18:37

## 2019-02-24 RX ADMIN — CALCIUM CHLORIDE 200 MG: 100 INJECTION, SOLUTION INTRAVENOUS at 11:42

## 2019-02-24 RX ADMIN — PIPERACILLIN SODIUM AND TAZOBACTAM SODIUM 2.25 G: 3; .375 INJECTION, POWDER, LYOPHILIZED, FOR SOLUTION INTRAVENOUS at 14:53

## 2019-02-24 RX ADMIN — EPINEPHRINE 10 MCG: 1 INJECTION PARENTERAL at 08:31

## 2019-02-24 RX ADMIN — PHENYLEPHRINE HYDROCHLORIDE 100 MCG: 10 INJECTION, SOLUTION INTRAMUSCULAR; INTRAVENOUS; SUBCUTANEOUS at 11:10

## 2019-02-24 RX ADMIN — EPINEPHRINE 0.03 MCG/KG/MIN: 1 INJECTION PARENTERAL at 08:23

## 2019-02-24 RX ADMIN — CEFAZOLIN SODIUM 2 G: 2 INJECTION, SOLUTION INTRAVENOUS at 09:28

## 2019-02-24 RX ADMIN — MYCOPHENOLATE MOFETIL 1500 MG: 250 CAPSULE ORAL at 03:26

## 2019-02-24 RX ADMIN — PIPERACILLIN SODIUM AND TAZOBACTAM SODIUM 2.25 G: 3; .375 INJECTION, POWDER, LYOPHILIZED, FOR SOLUTION INTRAVENOUS at 16:51

## 2019-02-24 RX ADMIN — NOREPINEPHRINE BITARTRATE 6.4 MCG: 1 INJECTION INTRAVENOUS at 12:05

## 2019-02-24 RX ADMIN — NOREPINEPHRINE BITARTRATE 6.4 MCG: 1 INJECTION INTRAVENOUS at 12:45

## 2019-02-24 RX ADMIN — PIPERACILLIN SODIUM AND TAZOBACTAM SODIUM 3.38 G: 3; .375 INJECTION, POWDER, LYOPHILIZED, FOR SOLUTION INTRAVENOUS at 10:54

## 2019-02-24 RX ADMIN — POTASSIUM CHLORIDE 20 MEQ: 400 INJECTION, SOLUTION INTRAVENOUS at 11:56

## 2019-02-24 RX ADMIN — POTASSIUM CHLORIDE 20 MEQ: 400 INJECTION, SOLUTION INTRAVENOUS at 05:44

## 2019-02-24 RX ADMIN — MYCOPHENOLATE MOFETIL 1500 MG: 500 INJECTION, POWDER, LYOPHILIZED, FOR SOLUTION INTRAVENOUS at 19:59

## 2019-02-24 RX ADMIN — FENTANYL CITRATE 150 MCG: 50 INJECTION, SOLUTION INTRAMUSCULAR; INTRAVENOUS at 08:32

## 2019-02-24 RX ADMIN — AMINOCAPROIC ACID 1 G/HR: 250 INJECTION, SOLUTION INTRAVENOUS at 20:13

## 2019-02-24 RX ADMIN — ROCURONIUM BROMIDE 60 MG: 10 INJECTION INTRAVENOUS at 08:33

## 2019-02-24 RX ADMIN — HEPARIN SODIUM 21000 UNITS: 1000 INJECTION, SOLUTION INTRAVENOUS; SUBCUTANEOUS at 12:33

## 2019-02-24 RX ADMIN — ETOMIDATE 6 MG: 2 INJECTION INTRAVENOUS at 08:31

## 2019-02-24 RX ADMIN — Medication 0.05 MCG/KG/MIN: at 13:00

## 2019-02-24 RX ADMIN — MIDAZOLAM 2 MG: 1 INJECTION INTRAMUSCULAR; INTRAVENOUS at 08:31

## 2019-02-24 RX ADMIN — MIDAZOLAM 1 MG: 1 INJECTION INTRAMUSCULAR; INTRAVENOUS at 12:56

## 2019-02-24 RX ADMIN — KETAMINE HYDROCHLORIDE 15 MG: 10 INJECTION, SOLUTION INTRAMUSCULAR; INTRAVENOUS at 16:06

## 2019-02-24 RX ADMIN — FENTANYL CITRATE 100 MCG: 50 INJECTION, SOLUTION INTRAMUSCULAR; INTRAVENOUS at 10:50

## 2019-02-24 RX ADMIN — HUMAN INSULIN 2 UNITS/HR: 100 INJECTION, SOLUTION SUBCUTANEOUS at 09:50

## 2019-02-24 RX ADMIN — NOREPINEPHRINE BITARTRATE 6.4 MCG: 1 INJECTION INTRAVENOUS at 12:42

## 2019-02-24 ASSESSMENT — ACTIVITIES OF DAILY LIVING (ADL)
ADLS_ACUITY_SCORE: 13

## 2019-02-24 ASSESSMENT — MIFFLIN-ST. JEOR: SCORE: 1356.08

## 2019-02-24 NOTE — PLAN OF CARE
D: Admitted 2/17 with Cardiogenic shock, listed for OHT (2/18)     No acute events overnight:  1) NPO for OHT  2) Pre-op Medications given, Mycophenolate + Solu-Medrol.     I: Monitored vitals and assessed pt status.   Running: Lasix (15 mg/hr) + Milrinone (0.3 mcg/kg/min)  PRN:Tylenol 650mg (x1)     Lytes replaced per protocol: K= 3.3 (+20mEq x2)     A:  Hemodynamics: CVP = (7) PA = (38 / 20) CO = (2.8)  CI = (1.7) SVO2 = (59) SVR = (1829) PVR = (174) SV = (39)  Neuro: Afebrile. A0x4. Shoulder and incisional pain controlled with PRN medications.   Resp: VSS on RA.  CV: 100% Paced. HRs (60-80s)  MAPs (65-83) IABP 1:1, 100% augmentation.   : Adequate UO this shift see I/O.     GI: (+) BM.       P: Continue to monitor Pt status and report changes to treatment team.

## 2019-02-24 NOTE — ANESTHESIA PROCEDURE NOTES
Perioperative ALESSANDRA Report  Anesthesia Information  ALESSANDRA probe placed and report generated by: : Nazia Camejo MD Olejniczak, Megan Joy, MD  Images for this study have been archived.     Echocardiogram Comments: Native heart with persistent L SVC --> interposition graft placed from LSVC to RA.  Transplanted heart hyperdynamic with normal systolic biventricular function.  Poor ventricular relaxation (e' = 8.3).  Mild MR, mild TR.    Preanesthesia Checklist:  Patient identified, IV assessed, risks and benefits discussed, monitors and equipment assessed, procedure being performed at surgeon's request and anesthesia consent obtained.  General Procedure Information  Modalities:  2D, CW Doppler, PW Doppler and Color flow mapping  Diagnostic indications for ALESSANDRA:                             Heart Transplant.    Echocardiographic and Doppler Measurements  Right Ventricle:  Cavity size dilated.   Global function moderately impaired.     Left Ventricle:  Cavity size dilated.   Global Function severely impaired.       Ventricular Regional Function:  1- Basal Anteroseptal:  hypokinetic  2- Basal Anterior:  hypokinetic  3- Basal Anterolateral:  hypokinetic  4- Basal Inferolateral:  hypokinetic  5- Basal Inferior:  hypokinetic  6- Basal Inferoseptal:  hypokinetic  7- Mid Anteroseptal:  hypokinetic  8- Mid Anterior:  hypokinetic  9- Mid Anterolateral:  hypokinetic  10- Mid Inferolateral:  hypokinetic  11- Mid Inferior:  hypokinetic  12- Mid Inferoseptal:  hypokinetic  13- Apical Anterior:  hypokinetic  14- Apical Lateral:  hypokinetic  15- Apical Inferior:  hypokinetic  16- Apical Septal:  hypokinetic  17- [place]:  hypokinetic    Valves  Aortic Valve: Annulus normal.  Stenosis not present.  Regurgitation absent.    Mitral Valve: Annulus normal.  Regurgitation +1.    Tricuspid Valve: Annulus normal.  Regurgitation +1.    Pulmonic Valve: Annulus normal.      Aorta: Ascending Aorta: Size normal.    Aortic Arch: Size normal.      Descending Aorta: Size normal.         Right Atrium:  Size dilated.    Left Atrium: Size dilated.  Left atrial appendage normal.     Atrial Septum: Intra-atrial septal morphology normal.     Ventricular Septum: Intra-ventricular septum morphology normal.       Other Findings:   .  . .  .  No coronary sinus catheter present.  .  .  Post Intervention Findings  Procedure(s) performed:  Heart or Lung Transplant. Global function:  Improved.   Regional wall motion:  Improved   Surgeon(s) notified of all postintervention findings:  Yes  .  .  .   .  .  .  .  Heart Transplant:  Transplant heart function normal.  .  .        Echocardiogram Comments  Native heart with persistent L SVC --> interposition graft placed from LSVC to RA.  Transplanted heart hyperdynamic with normal systolic biventricular function.  Poor ventricular relaxation (e' = 8.3).  Mild MR, mild TR.

## 2019-02-24 NOTE — OP NOTE
OPERATIVE DATE: 2/24/2019    PRE-OPERATIVE DIAGNOSIS:  1) Non-ischemic cardiomyopathy and heart failure  2) H/O congenital atrial septal defect repair  3) Paroxysmal atrial fibrillation  4) Ventricular tachycardia  5) Ulcerative colitis  6) S/P automated implanted cardiac defibrillator  7) Hypothyroidism  8) Degenerative joint disease  9) Pierce's esophagus  10) Intermittent asthma  11) Chronic rhinitis  12) Depression  13) Iron deficiency anemia    POST-OPERATIVE DIAGNOSIS:  1) Non-ischemic cardiomyopathy and heart failure  2) H/O congenital atrial septal defect repair  3) Paroxysmal atrial fibrillation  4) Ventricular tachycardia  5) Ulcerative colitis  6) S/P automated implanted cardiac defibrillator  7) Hypothyroidism  8) Degenerative joint disease  9) Pierce's esophagus  10) Intermittent asthma  11) Chronic rhinitis  12) Depression  13) Iron deficiency anemia    PROCEDURE:  1) Redo median sternotomy  2) Recipient cardiectomy  3) Orthotopic heart transplant  4) Transesophageal echocardiography  5) Bypass of persistent left superior vena cava to right atrial appendage with 14mm Goretex graft  6) Explant of automated implanted cardiac defibrillator (AICD)  7) Explant of left subclavian intra-aortic balloon pump (IABP)    SURGEON: Dewayne House MD    COSURGEON: Ben White MD    FELLOW: Shane Tanner MD; Carla Ortiz MD    ANESTHESIA: GETA    ESTIMATED BLOOD LOSS: 1000cc    OPERATIVE FINDINGS:  1) Severe dilated cardiomyopathy  2) Donor heart right ventricular function 55%  3) Donor heart left ventricular function 55%  4) No patent foramen ovale  5) Persistent left superior vena cava    CARDIOPULMONARY BYPASS TIME: 153 minutes    AORTIC CROSS CLAMP TIME: 98 minutes    DONOR HEART TOTAL ISCHEMIC TIME: 241 minutes    INDICATIONS:  Mr. Larios is a 54 yo M admitted with heart failure secondary to non-ischemic cardiomyopathy.  He has been listed for heart transplant.  A suitable donor heart became available.   The patient and his family were counseled about heart transplant.  Risks and benefits of the operation were explained to the patient and their family including, but not limited to, bleeding, infection, stroke and even death.  They understood these risks and agreed to proceed electively.    OPERATIVE REPORT:  The patient was transferred to the operating room and positioned supine on the OR table.  General anesthesia was initiated by the anesthesia team.  Endotracheal intubation and central venous access was performed by anesthesia.  The patients neck, chest, abdomen and bilateral lower extremities were clipped, prepped and draped in sterile fashion.  A pre-procedure time-out was performed confirming the correct patient, correct site and correct procedure.    A median sternotomy was performed using the reciprocating saw.  The patient was given 300 mg/kg IV heparin.  Pledgeted 2-0 ethibond pursestring was made in the ascending aorta.  A 18F EOPA arterial cannula was placed here and connected to the bypass circuit.  A 2-0 ethibond pursestring was made in the right superior vena cava.  Bicaval venous cannulation with 24F SVC cannula and 28F IVC cannula was performed via 2-0 ethibond pursestrings.  Cardiopulmonary bypass was initiated with good flows.  The cavae were snared.  A large aortic cross clamp was applied.  The recipient cardiectomy was performed using bicaval technique.  The field was flooded with CO2.  There was a persistent left superior vena cava.  This was preserved and drained to a cardiotomy suction.    ABO verification of the donor heart was performed and correct.  The donor heart was delivered.  The heart was wrapped in iced laps.  The donor heart was implanted in bicaval fashion.  First the left atrial anastomosis was created using running 3-0 prolene stitch.  An LV vent was placed.  Next the IVC anastomosis was completed with running 4-0 prolene.  Next the pulmonary artery anastomosis was completed  using running 4-0 prolene.  Next the aortic anastomosis was completed using running 4-0 prolene in two layers.  Finally the SVC anastomosis was completed using running 4-0 prolene.      A dose of IV steroids was administered.  A DLP root vent was placed via pledgeted 4-0 prolene pursestring.  The heart was deaired.  Next the aortic cross clamp was removed.  Ventricular pacing wires were placed.  The heart was paced at 90 beats per minute.  Isoproterinol drip was started.  The donor heart was resuscitated on bypass for 30 minutes.     The persistent left superior vena cava was anastomosed to a 14mm ringed Goretex graft using running 4-0 prolene.  This was bypassed to the right atrial appendage and anastomosed using running 4-0 prolene.    Next we focused on weaning from cardiopulmonary bypass.  The LV vent was removed.  The root vent was removed.  The lungs were ventilated and IV calcium was administered.  Next flow on the bypass circuit was weaned off.  The patient tolerated this well.  The IVC cannula was removed, pursestring tied, and oversewed with 4-0 prolene.  The SVC cannula was removed, pursestring tied, and oversewed with 4-0 prolene.   The remaining pump blood was returned to the patient.  Test dose protamine was administered.  The aortic cannula was removed.  The pursestrings were tied.  This was oversewed with pledgeted 4-0 prolene.  Donor heart function was adequate on low doses of inotropic support.    The automated implanted cardiac defibrillator was removed from the right chest wall via the previous scar.  The wound was made hemostatic and closed in layers using vicryl stitches.     The left subclavian intra-aortic balloon pump was removed.  The graft was clamped and amputated near the base.  The graft was oversewn with 4-0 prolene.  The wound was made hemostatic and closed in layers using vicryl stitches.    The sternal retractor was removed.  Two 32F straight argyle mediastinal chest tubes, a 24F  mediastinal lena drain and bilateral 28F pleural chest tubes were placed.  These were delivered through the anterior abdominal wall and secured to the skin with 0-ethibond stitches.      Atrial pacing wires were placed.    The wound was made hemostatic with cautery.  The subcutaneous tissue, sternal edges, thymic fat, pericardial edges and all anastomotic and cannulation sites were inspected and hemostatic.    The wound was irrigated with warm antibiotic saline.  The sternum was reapproximated with sternal wires.    The wound was made hemostatic then closed in layers using vicryl stitches.  The subcutaneous tissue was closed with 2-0 vicryl stitches.  The skin was closed with 3-0 vicryl.  Dermabond skin glue was applied.  A sterile dressing was applied.      The patient was then transferred from the operating bed to an ICU bed and transferred to the ICU in critical, but stable, condition.    All needle, sponge and instrument counts were correct at the end of the case.    Dewayne House  Cardiothoracic Surgery  Pager: [phone]

## 2019-02-24 NOTE — PROGRESS NOTES
Pt received Heart transplant on 02/24/19, donor ID [id number], removed from [facility]OS transplant waitlist.

## 2019-02-24 NOTE — ANESTHESIA PROCEDURE NOTES
ALESSANDRA Probe Insertion Note:    Inserted by:  Nazia Camejo MD (Responsible Anesthesiologist)  Probe Number: 11  Probe Status PRE Insertion: NO obvious damage  Probe type:  Adult 3D    Bite block used:   Yes  Insertion Technique: Jaw Lift, Recurrent attempts  Insertion complications: None obvious    Billing Report:ALESSANDRA report by Anesthesiologist (See Separate Report note)    Probe Status POST Removal: NO obvious damage

## 2019-02-24 NOTE — PROGRESS NOTES
Patient went to OR about 0800. Patient's chart with signed consent and preop antibiotics were given to CRNA. Patient's family was present during transfer.

## 2019-02-24 NOTE — ANESTHESIA PREPROCEDURE EVALUATION
Anesthesia Pre-Procedure Evaluation    Patient: Everton Larios   MRN:     [id number] Gender:   male   Age:    53 year old :      1965        Preoperative Diagnosis: Non-Ischemic cardiomyopathy   Procedure(s):  TRANSPLANT HEART RECIPIENT     Past Medical History:   Diagnosis Date     Alcohol abuse      Pierce's esophagus 10/4/2018     Chronic rhinitis 10/4/2018     Chronic systolic heart failure (H) 10/4/2018     Depression 10/4/2018     Diastolic dysfunction      DJD (degenerative joint disease) - neck 10/4/2018     H/O congenital atrial septal defect (ASD) repair at age 3 10/4/2018     Hypothyroidism due to medication 10/4/2018     ICD, Next 2 Greatness ; gen change 2018 10/4/2018     Intermittent asthma without complication 10/4/2018     Nonischemic cardiomyopathy (H) 10/4/2018     On amiodarone therapy 10/4/2018     Paroxysmal atrial fibrillation (H) 10/4/2018     S/P ablation of atrial fibrillation 10/4/2018     Systolic heart failure (H)      Ulcerative colitis (H) 10/4/2018     Ventricular tachycardia (H) 10/4/2018      Past Surgical History:   Procedure Laterality Date     AICD, DUAL CHAMBER       INSERT INTRAAORTIC BALLOON PUMP Left 2019    Procedure: Insert left  Subclavian Balloon Pump,  Removal Right femoral arterial balloom pump sheath;  Surgeon: Dewayne House MD;  Location: [facility] OR     INSERT INTRAAORTIC BALLOON PUMP Left 2019    Procedure: SUBCLAVIAN BALLOON PUMP PLACEMENT;  Surgeon: Ben White MD;  Location: [facility] OR          Anesthesia Evaluation     . Pt has had prior anesthetic. Type: General    No history of anesthetic complications          ROS/MED HX    ENT/Pulmonary: Comment: Placement Date: 19 Placement Time: 1207 Mask Ventilation: Easy Induction type: Intravenous Ease of Intubation: Easy Technique: Asleep;Direct Size: 8 Cuffed: Cuffed ETT Type: Oral Blade Type: Bradley Blade Size: 2 Placed By: DANIELLE Inserted by: Dorita Attempts: 1 Secured at (cm) to  lip: 24 cm Secured Location: Right Breath Sounds: Equal, clear and bilateral End Tidal CO2: Present Dentition/Oral: Intact;Unchanged Grade view of cords: 1         (+)Intermittent asthma , . .    Neurologic:       Cardiovascular: Comment: NICM, HFrEF (EF 15%) s/p ICD placement (2008, generator change 2018), congenital ASD repair in childhood, paroxysmal atrial fibrillation s/p ablation and cardioversion.  Chronic decompensated systolic heart  failure and ambulatory cardiogenic shock, inotrope dependent.    Admitted for heart transplant, now with L subclavian balloon pump.    (+) ----. : . CHF etiology: NICM Last EF: 10 NYHA classification: IV. . :. dysrhythmias a-fib, .       METS/Exercise Tolerance: Comment: Exercise tolerance improved on milrinone, IABP 3 - Able to walk 1-2 blocks without stopping   Hematologic:     (+) Other Hematologic Disorder-      Musculoskeletal:         GI/Hepatic: Comment: Remote history of GI bleed with splenic embolization    (+) GERD Inflammatory bowel disease (ulcerative colitis),       Renal/Genitourinary:     (+) BPH,       Endo:     (+) thyroid problem hypothyroidism, .      Psychiatric:     (+) psychiatric history depression      Infectious Disease:         Malignancy:         Other:                         PHYSICAL EXAM:   Mental Status/Neuro: A/A/O   Airway: Facies: Feasible  Mallampati: II  Mouth/Opening: Full  TM distance: < 6 cm  Neck ROM: Full   Respiratory: Auscultation: CTAB     Resp. Rate: Normal     Resp. Effort: Normal      CV: Rhythm: Regular  Rate: Age appropriate   Comments:                    Lab Results   Component Value Date    WBC 6.5 02/23/2019    HGB 13.4 02/23/2019    HCT 41.2 02/23/2019     (L) 02/23/2019    CRP 6.1 11/15/2018     02/23/2019    POTASSIUM 3.8 02/23/2019    CHLORIDE 94 02/23/2019    CO2 30 02/23/2019    BUN 25 02/23/2019    CR 1.04 02/23/2019    GLC 91 02/23/2019    RADAMES 9.0 02/23/2019    PHOS 4.1 02/21/2019    MAG 2.0  "02/23/2019    ALBUMIN 3.5 02/17/2019    PROTTOTAL 6.8 02/17/2019    ALT 20 02/17/2019    AST 19 02/17/2019    ALKPHOS 146 02/17/2019    BILITOTAL 2.0 (H) 02/17/2019    PTT 36 02/17/2019    INR 1.33 (H) 02/21/2019    TSH 10.41 (H) 01/02/2019    T4 1.32 01/02/2019       Preop Vitals  BP Readings from Last 3 Encounters:   02/23/19 (!) 73/59   01/30/19 99/74   01/14/19 100/66    Pulse Readings from Last 3 Encounters:   02/22/19 63   01/30/19 77   01/14/19 85      Resp Readings from Last 3 Encounters:   02/23/19 16   11/28/18 18   11/23/18 16    SpO2 Readings from Last 3 Encounters:   02/23/19 94%   01/30/19 98%   01/14/19 94%      Temp Readings from Last 1 Encounters:   02/23/19 37  C (98.6  F) (Oral)    Ht Readings from Last 1 Encounters:   02/17/19 1.727 m (5' 8\")      Wt Readings from Last 1 Encounters:   02/23/19 57.4 kg (126 lb 9.6 oz)    Estimated body mass index is 19.25 kg/m  as calculated from the following:    Height as of this encounter: 1.727 m (5' 8\").    Weight as of this encounter: 57.4 kg (126 lb 9.6 oz).     LDA:  PICC Single Lumen 11/19/18 Right Basilic (Active)   Site Assessment WDL 2/23/2019  4:00 PM   Line Status Infusing 2/23/2019  4:00 PM   Extravasation? No 2/23/2019  4:00 PM   Dressing Intervention Transparent 2/23/2019  4:00 PM   Dressing Change Due 02/24/19 2/23/2019  4:00 PM   PICC Comment 2/24/2019 2/21/2019  4:00 AM   Number of days: 96       CVC Double Lumen 02/17/19 Right Internal jugular (Active)   Site Assessment WDL 2/23/2019  4:00 PM   Dressing Intervention Chlorhexidine sponge;Transparent 2/23/2019  4:00 PM   Dressing Change Due 02/24/19 2/23/2019  4:00 PM   CVC Comment SGC 2/22/2019 12:00 PM   Lumen A - Color BROWN 2/23/2019  4:00 PM   Lumen A - Status infusing 2/23/2019  4:00 PM   Lumen A - Cap Change Due 02/26/19 2/23/2019  4:00 PM   Lumen B - Color WHITE 2/23/2019  4:00 PM   Lumen B - Status infusing 2/23/2019  4:00 PM   Lumen B - Cap Change Due 02/26/19 2/23/2019  4:00 PM "   Extravasation? No 2/23/2019  4:00 PM   Number of days: 6       Arterial Sheath  (Active)   Specific Qualities Sutured 2/23/2019  4:00 PM   Site Assessment WDL 2/23/2019  4:00 PM   Dressing Type Biopatch;Transparent 2/23/2019  4:00 PM   Arterial Sheath Comment IABP 2/23/2019  4:00 PM   Number of days: 2       Pulmonary Artery Catheter - Quad Lumen 02/17/19 1500 Right internal jugular vein monitoring catheter (Active)   Dressing dressing dry and intact 2/23/2019  4:00 PM   Securement secured with sutures 2/23/2019  4:00 PM   PA Catheter Markings (cm) 58 2/23/2019  4:00 PM   Phlebitis 0-->no symptoms 2/23/2019  4:00 PM   Infiltration 0-->no symptoms 2/23/2019  4:00 PM   Waveform normal 2/23/2019  4:00 PM   Lumen A - Color BROWN 2/23/2019  4:00 PM   Lumen A - Status infusing 2/23/2019  4:00 PM   Lumen A - Cap Change Due 02/26/19 2/23/2019  4:00 PM   Lumen B - Color WHITE 2/23/2019  4:00 PM   Lumen B - Status saline locked 2/23/2019  4:00 PM   Lumen B - Cap Change Due 02/26/19 2/23/2019  4:00 PM   Lumen C - Color OTHER 2/23/2019  4:00 PM   Lumen C - Status infusing 2/23/2019  4:00 PM   Lumen C - Cap Change Due 02/26/19 2/23/2019  4:00 PM   Number of days: 6            Assessment:   ASA SCORE: 4    NPO Status: > 6 hours since completed Solid Foods   Documentation: H&P complete; Preop Testing complete; Consents complete   Proceeding: Proceed without further delay  Tobacco Use:  NO Active use of Tobacco/UNKNOWN Tobacco use status     Plan:   Anes. Type:  General   Pre-Induction: None     Fluid/Blood-Preparation: Blood in Room; PRBC; FFP; Hot Line; Albumin; PLT; Cell Saver     Drips/Meds-Preparation: Heparin; Norepi; Milrinone; Epinephrine; Aminocaproic Acid; Vasopressin   Induction:  IV (Standard)   Airway: Oral ETT   Access/Monitoring: PIV; 2nd PIV; FloTrac; A-Line; US (PIV, R PICC, RIJ PA catheter, subclavian IABP in situ)     Advanced Access: CPB     Advanced Monitoring: NIRS (cerebral); ALESSANDRA Adult            ADULT ALESSANDRA  Checklist:               Absolute Contra-Indications: NONE               Relative Contra-Indications:  Thrombocytopenia               Final Plan: Consider GI Consult; Use Smallest Possible Probe; Use Video-Laryngoscope to place; Only Advance to Mid-Esophagus   Maintenance: Balanced   Emergence: Post-Procedural Sedation; Postop SECURED AIRWAY likely   Logistics: ICU Admission     Postop Pain/Sedation Strategy:  Standard-Options: Opioids PRN  Advanced Options: Dexmedetomidine (cont.)     PONV Management:  Adult Risk Factors:, Non-Smoker, Postop Opioids  Prevention: Ondansetron     CONSENT: Direct conversation   Plan and risks discussed with: Patient   Blood Products: Consented (ALL Blood Products)       Comments for Plan/Consent:  53M with NICM, on milrinone past 3 months but failing outpt management, now with L subclavian IABP scheduled for heart transplant.  With milrinone and IABP in place, he is comfortable ambulating in the hospital and is able to lie flat.  Right internal jugular MAC line in place with PAC (not CCO).  ASA 4.  Plan for GETA with art, cvp, CCO pac, niranjan, postop ICU admission with mechanical ventilation, possible need for blood transfusion.  Plan discussed with patient and family; all questions answered.                           Mars Perez MD

## 2019-02-24 NOTE — ANESTHESIA PROCEDURE NOTES
Arterial Line Procedure Note  Staff:     Anesthesiologist:  Nazia Camejo MD  Location: In OR After Induction  Procedure Start/Stop Times:     patient identified, IV checked, risks and benefits discussed, informed consent, monitors and equipment checked, pre-op evaluation and at physician/surgeon's request      Correct Patient: Yes      Correct Position: Yes      Correct Site: Yes      Correct Procedure: Yes      Correct Laterality:  N/A    Site Marked:  N/A  Line Placement:     Procedure:  Arterial Line    Insertion Site:  Radial    Insertion laterality:  Left    Skin Prep: Chloraprep      Patient Prep: mask, sterile gloves, hat and hand hygiene      Local skin infiltration:  None    Ultrasound Guided?: No      Catheter size:  20 gauge, Quick cath    Cath secured with: suture      Complications:  None obvious    Arterial waveform: Yes      IBP within 10% of NIBP: Yes

## 2019-02-24 NOTE — PROGRESS NOTES
[facility] Cardiology Progress Note  2/24/2019           Assessment and Plan:   Everton Larios is a 52 yo gentleman with a h/o NICM, HFrEF (EF 15%) s/p ICD placement (2008, generator change 2018), congenital ASD repair in childhood, paroxysmal atrial fibrillation s/p ablation and cardioversion, hypothyroidism, EtOH abuse, ulcerative colitis, remote history of GI bleed with splenic embolization, and a recent admission in 11/2018 for ambulatory cardiogenic shock requiring initiation of IV inotropes, who was admitted for heart transplant, now status 2 with a L subclavian balloon pump.     Changes today:  - OR at 0800 for heart transplant  - premedication with hydrocortisone IV and MMF ordered  - CVICU to take over as primary team after surgery, we will continue to follow as consult    # Chronic decompensated systolic heart failure s/p L subclavin balloon pump (2/21)  # Ambulatory cardiogenic shock, inotrope dependent  # NICM s/p ICD  # H/o congenital ASD repair  NYHA class: IV  AHA stage: D  Etiology: NICM  - OR at 0800 for heart transplant  - premedication with hydrocortisone IV and MMF ordered  - CVICU to take over as primary team after surgery, we will continue to follow as consult  - continue milrinone at 0.25 mcg/kg/min  - continue lasix gtt 15 mg/hr, continue BMP, Mg BID  - continue spironolactone 12.5 mg daily  - monitor Harriet numbers Qshift     # Paroxysmal Afib s/p ablation  - continue holding heparin gtt given concern for surgical site oozing from IABP  - continue PTA amiodarone 200 mg daily  - no AV lily blocking agents     # Hypothyroidism: continue PTA levothyroxine  # Ulcerative colitis: currently in remission, continue PTA mesalamine  # GERD: continue PTA omeprazole  # BPH: continue PTA tamsulosin  # Depression: continue PTA duloxetine  # H/o EtOH abuse: currently denies any EtOH since 4 months ago    FEN: 2g Na, 2L fluid restriction  Ppx: heparin gtt  LDA: PIV, R PICC, RIJ PA catheter, subclavian  IABP  Dispo: Pending potential heart transplant this admission, currently status 2     CODE: Full     Patient was seen and discussed with Dr. Young, who agrees with the assessment and plan.    Saud Jaeger MD  Internal Medicine PGY-2  Pager [phone]       Subjective:     RN notes reviewed, there was confusion about timing of the premedication with hydrocortisone and MMF, but was eventually administered prior to OR this AM. Patient was comfortable over night, looked to be in good spirits prior to going to OR.          Medications:       amiodarone  200 mg Oral Daily     ceFAZolin  1 g Intravenous See Admin Instructions     ceFAZolin  2 g Intravenous Pre-Op/Pre-procedure x 1 dose     DULoxetine  20 mg Oral Daily     fluticasone  1 puff Inhalation Daily     levothyroxine  100 mcg Oral Daily     mesalamine  2,400 mg Oral BID     montelukast  10 mg Oral At Bedtime     mycophenolate  1,500 mg Oral Q12H     omeprazole  40 mg Oral QPM     potassium chloride  40 mEq Oral Daily     sodium chloride (PF)  3 mL Intravenous Q8H     sodium chloride (PF)  3 mL Intracatheter Q8H     spironolactone  12.5 mg Oral Daily     tamsulosin  0.8 mg Oral Daily with supper       furosemide 15 mg/hr (02/24/19 0600)     - MEDICATION INSTRUCTIONS -       milrinone 0.3 mcg/kg/min (02/24/19 0600)     Reason anticoagulation order not selected       - MEDICATION INSTRUCTIONS -            Objective:          Physical Exam:   Temp:  [97.7  F (36.5  C)-99.1  F (37.3  C)] 98.1  F (36.7  C)  Heart Rate:  [62-82] 64  Resp:  [16-20] 16  BP: ()/(53-78) 92/73  SpO2:  [92 %-99 %] 98 %  I/O last 3 completed shifts:  In: 2646.6 [P.O.:1976; I.V.:670.6]  Out: 3225 [Urine:3225]    Vitals:    02/18/19 0500 02/19/19 0500 02/22/19 0700 02/23/19 0400   Weight: 60.3 kg (133 lb) 59.3 kg (130 lb 12.8 oz) 57.9 kg (127 lb 11.2 oz) 57.4 kg (126 lb 9.6 oz)    02/24/19 0400   Weight: 54.7 kg (120 lb 8 oz)     CVP 7  PA 38/20  PCWP 11  CO 2.8/CI 1.7  SVO2  59%  SVR 1800    General:  Alert, sitting up in bed, no acute distress.  HEENT: Atraumatic, anicteric sclera, extraocular motion intact, nares dry, mucus membranes moist.  Chest: Subclavian IABP in place, dressings CDI  CV: Normal rate, irregular rhythm. Balloon pump sounds present.  No murmurs, rubs or gallops. JVD not seen.  Resp: Clear to auscultation bilaterally, no accessory muscle use.  Abdomen: Bowel sounds +, soft, nontender, no hepatosplenomegaly, no masses.  Extremities: No peripheral edema, warm and well perfused.  Skin: No rashes, bruising, or jaundice.  Neuro: Alert, oriented, symmetric facial expressions, moving extremities equally  Psych: Pleasant affect.         Data:   BMP  Recent Labs   Lab 02/24/19 1135 02/24/19 0340 02/23/19 2010 02/23/19 1835 02/23/19  1705  02/23/19 0332    135  --  133 133  --  136   POTASSIUM 2.9* 3.3* 4.1 3.6 3.8   < > 3.4   CHLORIDE  --  95  --  93* 94  --  94   RADAMES  --  9.1  --  8.9 9.0  --  8.8   CO2  --  27  --  30 30  --  31   BUN  --  25  --  28 25  --  21   CR  --  0.90  --  1.03 1.04  --  0.88   * 109*  --  114* 91  --  97    < > = values in this interval not displayed.      CBC  Recent Labs   Lab 02/24/19  1135 02/24/19 0340 02/23/19 1835 02/23/19  0332 02/22/19  0316   WBC  --  7.2 6.9 6.5 7.4   RBC  --  4.98 4.68 4.51 4.03*   HGB 14.1 14.9 14.2 13.4 12.1*   HCT  --  44.9 43.3 41.2 37.3*   MCV  --  90 93 91 93   MCH  --  29.9 30.3 29.7 30.0   MCHC  --  33.2 32.8 32.5 32.4   RDW  --  13.3 13.3 13.4 13.4   PLT  --  132* 122* 107* 105*     INR  Recent Labs   Lab 02/24/19  0340 02/23/19  1835 02/21/19  0315 02/20/19 2014   INR 1.19* 1.20* 1.33* 1.35*

## 2019-02-25 ENCOUNTER — APPOINTMENT (OUTPATIENT)
Dept: OCCUPATIONAL THERAPY | Facility: CLINIC | Age: 54
DRG: 001 | End: 2019-02-25
Attending: INTERNAL MEDICINE
Payer: COMMERCIAL

## 2019-02-25 ENCOUNTER — APPOINTMENT (OUTPATIENT)
Dept: GENERAL RADIOLOGY | Facility: CLINIC | Age: 54
DRG: 001 | End: 2019-02-25
Attending: PHYSICIAN ASSISTANT
Payer: COMMERCIAL

## 2019-02-25 PROBLEM — Z94.1 HEART REPLACED BY TRANSPLANT (H): Status: ACTIVE | Noted: 2019-02-24

## 2019-02-25 PROBLEM — I50.9 HEART FAILURE (H): Status: ACTIVE | Noted: 2019-02-25

## 2019-02-25 LAB
ALBUMIN SERPL-MCNC: 3.4 G/DL (ref 3.4–5)
ALP SERPL-CCNC: 89 U/L (ref 40–150)
ALT SERPL W P-5'-P-CCNC: 28 U/L (ref 0–70)
ANION GAP SERPL CALCULATED.3IONS-SCNC: 10 MMOL/L (ref 3–14)
ANION GAP SERPL CALCULATED.3IONS-SCNC: 11 MMOL/L (ref 3–14)
ANION GAP SERPL CALCULATED.3IONS-SCNC: 12 MMOL/L (ref 3–14)
APTT PPP: 32 SEC (ref 22–37)
AST SERPL W P-5'-P-CCNC: 110 U/L (ref 0–45)
BASE DEFICIT BLDA-SCNC: 0.3 MMOL/L
BASE DEFICIT BLDA-SCNC: 1.1 MMOL/L
BASE DEFICIT BLDA-SCNC: 1.6 MMOL/L
BASE DEFICIT BLDA-SCNC: 1.6 MMOL/L
BASE DEFICIT BLDA-SCNC: 1.8 MMOL/L
BASE DEFICIT BLDA-SCNC: 2 MMOL/L
BASE DEFICIT BLDA-SCNC: 2.6 MMOL/L
BASE DEFICIT BLDA-SCNC: 2.7 MMOL/L
BASE DEFICIT BLDA-SCNC: 4.3 MMOL/L
BASE DEFICIT BLDA-SCNC: 6.7 MMOL/L
BASE DEFICIT BLDV-SCNC: 0.1 MMOL/L
BASE DEFICIT BLDV-SCNC: 0.6 MMOL/L
BASE DEFICIT BLDV-SCNC: 0.8 MMOL/L
BASE DEFICIT BLDV-SCNC: 0.9 MMOL/L
BASE DEFICIT BLDV-SCNC: 1.6 MMOL/L
BASE DEFICIT BLDV-SCNC: 2.4 MMOL/L
BASE DEFICIT BLDV-SCNC: 3.3 MMOL/L
BASE DEFICIT BLDV-SCNC: 3.9 MMOL/L
BASE EXCESS BLDA CALC-SCNC: 0.2 MMOL/L
BILIRUB SERPL-MCNC: 1.4 MG/DL (ref 0.2–1.3)
BUN SERPL-MCNC: 35 MG/DL (ref 7–30)
BUN SERPL-MCNC: 38 MG/DL (ref 7–30)
BUN SERPL-MCNC: 45 MG/DL (ref 7–30)
CA-I BLD-MCNC: 4.3 MG/DL (ref 4.4–5.2)
CA-I BLD-MCNC: 4.4 MG/DL (ref 4.4–5.2)
CA-I BLD-MCNC: 4.4 MG/DL (ref 4.4–5.2)
CA-I BLD-MCNC: 4.5 MG/DL (ref 4.4–5.2)
CA-I BLD-MCNC: 4.6 MG/DL (ref 4.4–5.2)
CA-I BLD-MCNC: 4.7 MG/DL (ref 4.4–5.2)
CA-I BLD-MCNC: 4.8 MG/DL (ref 4.4–5.2)
CA-I BLD-MCNC: 4.8 MG/DL (ref 4.4–5.2)
CALCIUM SERPL-MCNC: 8.4 MG/DL (ref 8.5–10.1)
CALCIUM SERPL-MCNC: 8.7 MG/DL (ref 8.5–10.1)
CALCIUM SERPL-MCNC: 8.9 MG/DL (ref 8.5–10.1)
CHLORIDE SERPL-SCNC: 107 MMOL/L (ref 94–109)
CHLORIDE SERPL-SCNC: 107 MMOL/L (ref 94–109)
CHLORIDE SERPL-SCNC: 108 MMOL/L (ref 94–109)
CO2 SERPL-SCNC: 23 MMOL/L (ref 20–32)
CO2 SERPL-SCNC: 24 MMOL/L (ref 20–32)
CO2 SERPL-SCNC: 25 MMOL/L (ref 20–32)
CREAT SERPL-MCNC: 1.25 MG/DL (ref 0.66–1.25)
CREAT SERPL-MCNC: 1.48 MG/DL (ref 0.66–1.25)
CREAT SERPL-MCNC: 1.54 MG/DL (ref 0.66–1.25)
ERYTHROCYTE [DISTWIDTH] IN BLOOD BY AUTOMATED COUNT: 13.8 % (ref 10–15)
ERYTHROCYTE [DISTWIDTH] IN BLOOD BY AUTOMATED COUNT: 14.1 % (ref 10–15)
ERYTHROCYTE [DISTWIDTH] IN BLOOD BY AUTOMATED COUNT: 14.5 % (ref 10–15)
GAMMA INTERFERON BACKGROUND BLD IA-ACNC: 0.05 IU/ML
GFR SERPL CREATININE-BSD FRML MDRD: 50 ML/MIN/{1.73_M2}
GFR SERPL CREATININE-BSD FRML MDRD: 52 ML/MIN/{1.73_M2}
GFR SERPL CREATININE-BSD FRML MDRD: 64 ML/MIN/{1.73_M2}
GLUCOSE BLD-MCNC: 155 MG/DL (ref 70–99)
GLUCOSE BLD-MCNC: 220 MG/DL (ref 70–99)
GLUCOSE BLD-MCNC: 221 MG/DL (ref 70–99)
GLUCOSE BLD-MCNC: 239 MG/DL (ref 70–99)
GLUCOSE BLD-MCNC: 296 MG/DL (ref 70–99)
GLUCOSE BLD-MCNC: 297 MG/DL (ref 70–99)
GLUCOSE BLD-MCNC: 307 MG/DL (ref 70–99)
GLUCOSE BLDC GLUCOMTR-MCNC: 103 MG/DL (ref 70–99)
GLUCOSE BLDC GLUCOMTR-MCNC: 106 MG/DL (ref 70–99)
GLUCOSE BLDC GLUCOMTR-MCNC: 110 MG/DL (ref 70–99)
GLUCOSE BLDC GLUCOMTR-MCNC: 110 MG/DL (ref 70–99)
GLUCOSE BLDC GLUCOMTR-MCNC: 112 MG/DL (ref 70–99)
GLUCOSE BLDC GLUCOMTR-MCNC: 127 MG/DL (ref 70–99)
GLUCOSE BLDC GLUCOMTR-MCNC: 134 MG/DL (ref 70–99)
GLUCOSE BLDC GLUCOMTR-MCNC: 139 MG/DL (ref 70–99)
GLUCOSE BLDC GLUCOMTR-MCNC: 142 MG/DL (ref 70–99)
GLUCOSE BLDC GLUCOMTR-MCNC: 158 MG/DL (ref 70–99)
GLUCOSE BLDC GLUCOMTR-MCNC: 169 MG/DL (ref 70–99)
GLUCOSE BLDC GLUCOMTR-MCNC: 179 MG/DL (ref 70–99)
GLUCOSE BLDC GLUCOMTR-MCNC: 182 MG/DL (ref 70–99)
GLUCOSE BLDC GLUCOMTR-MCNC: 185 MG/DL (ref 70–99)
GLUCOSE BLDC GLUCOMTR-MCNC: 202 MG/DL (ref 70–99)
GLUCOSE BLDC GLUCOMTR-MCNC: 221 MG/DL (ref 70–99)
GLUCOSE BLDC GLUCOMTR-MCNC: 249 MG/DL (ref 70–99)
GLUCOSE BLDC GLUCOMTR-MCNC: 256 MG/DL (ref 70–99)
GLUCOSE BLDC GLUCOMTR-MCNC: 77 MG/DL (ref 70–99)
GLUCOSE BLDC GLUCOMTR-MCNC: 80 MG/DL (ref 70–99)
GLUCOSE BLDC GLUCOMTR-MCNC: 87 MG/DL (ref 70–99)
GLUCOSE BLDC GLUCOMTR-MCNC: 90 MG/DL (ref 70–99)
GLUCOSE SERPL-MCNC: 120 MG/DL (ref 70–99)
GLUCOSE SERPL-MCNC: 139 MG/DL (ref 70–99)
GLUCOSE SERPL-MCNC: 178 MG/DL (ref 70–99)
HCO3 BLD-SCNC: 19 MMOL/L (ref 21–28)
HCO3 BLD-SCNC: 23 MMOL/L (ref 21–28)
HCO3 BLD-SCNC: 23 MMOL/L (ref 21–28)
HCO3 BLD-SCNC: 24 MMOL/L (ref 21–28)
HCO3 BLD-SCNC: 25 MMOL/L (ref 21–28)
HCO3 BLD-SCNC: 26 MMOL/L (ref 21–28)
HCO3 BLD-SCNC: 26 MMOL/L (ref 21–28)
HCO3 BLDV-SCNC: 23 MMOL/L (ref 21–28)
HCO3 BLDV-SCNC: 24 MMOL/L (ref 21–28)
HCO3 BLDV-SCNC: 24 MMOL/L (ref 21–28)
HCO3 BLDV-SCNC: 25 MMOL/L (ref 21–28)
HCO3 BLDV-SCNC: 26 MMOL/L (ref 21–28)
HCO3 BLDV-SCNC: 27 MMOL/L (ref 21–28)
HCT VFR BLD AUTO: 34.2 % (ref 40–53)
HCT VFR BLD AUTO: 35.1 % (ref 40–53)
HCT VFR BLD AUTO: 35.1 % (ref 40–53)
HGB BLD-MCNC: 10.9 G/DL (ref 13.3–17.7)
HGB BLD-MCNC: 11.1 G/DL (ref 13.3–17.7)
HGB BLD-MCNC: 11.1 G/DL (ref 13.3–17.7)
HGB BLD-MCNC: 11.3 G/DL (ref 13.3–17.7)
HGB BLD-MCNC: 11.7 G/DL (ref 13.3–17.7)
HGB BLD-MCNC: 11.9 G/DL (ref 13.3–17.7)
HGB BLD-MCNC: 12.3 G/DL (ref 13.3–17.7)
HGB BLD-MCNC: 12.5 G/DL (ref 13.3–17.7)
HGB BLD-MCNC: 14.8 G/DL (ref 13.3–17.7)
HGB BLD-MCNC: 14.9 G/DL (ref 13.3–17.7)
INR PPP: 1.29 (ref 0.86–1.14)
INTERPRETATION ECG - MUSE: NORMAL
LACTATE BLD-SCNC: 1.2 MMOL/L (ref 0.7–2)
LACTATE BLD-SCNC: 1.8 MMOL/L (ref 0.7–2)
LACTATE BLD-SCNC: 2.4 MMOL/L (ref 0.7–2)
LACTATE BLD-SCNC: 2.6 MMOL/L (ref 0.7–2)
LACTATE BLD-SCNC: 2.8 MMOL/L (ref 0.7–2)
LACTATE BLD-SCNC: 5.1 MMOL/L (ref 0.7–2)
LACTATE BLD-SCNC: 5.9 MMOL/L (ref 0.7–2)
LACTATE BLD-SCNC: 6.1 MMOL/L (ref 0.7–2)
M TB IFN-G BLD-IMP: NEGATIVE
M TB IFN-G CD4+ BCKGRND COR BLD-ACNC: >10 IU/ML
MAGNESIUM SERPL-MCNC: 2.7 MG/DL (ref 1.6–2.3)
MAGNESIUM SERPL-MCNC: 2.9 MG/DL (ref 1.6–2.3)
MAGNESIUM SERPL-MCNC: 3 MG/DL (ref 1.6–2.3)
MCH RBC QN AUTO: 30.2 PG (ref 26.5–33)
MCH RBC QN AUTO: 30.3 PG (ref 26.5–33)
MCH RBC QN AUTO: 30.5 PG (ref 26.5–33)
MCHC RBC AUTO-ENTMCNC: 31.6 G/DL (ref 31.5–36.5)
MCHC RBC AUTO-ENTMCNC: 32.2 G/DL (ref 31.5–36.5)
MCHC RBC AUTO-ENTMCNC: 32.5 G/DL (ref 31.5–36.5)
MCV RBC AUTO: 93 FL (ref 78–100)
MCV RBC AUTO: 95 FL (ref 78–100)
MCV RBC AUTO: 96 FL (ref 78–100)
MITOGEN IGNF BCKGRD COR BLD-ACNC: 0 IU/ML
MITOGEN IGNF BCKGRD COR BLD-ACNC: 0 IU/ML
MRSA DNA SPEC QL NAA+PROBE: NEGATIVE
O2/TOTAL GAS SETTING VFR VENT: 100 %
O2/TOTAL GAS SETTING VFR VENT: 2 %
O2/TOTAL GAS SETTING VFR VENT: 2 %
O2/TOTAL GAS SETTING VFR VENT: 40 %
O2/TOTAL GAS SETTING VFR VENT: 50 %
O2/TOTAL GAS SETTING VFR VENT: 60 %
O2/TOTAL GAS SETTING VFR VENT: 80 %
O2/TOTAL GAS SETTING VFR VENT: ABNORMAL %
OXYHGB MFR BLD: 95 % (ref 92–100)
OXYHGB MFR BLD: 97 % (ref 92–100)
OXYHGB MFR BLD: 98 % (ref 92–100)
OXYHGB MFR BLD: 98 % (ref 92–100)
OXYHGB MFR BLDV: 47 %
OXYHGB MFR BLDV: 55 %
OXYHGB MFR BLDV: 58 %
OXYHGB MFR BLDV: 58 %
OXYHGB MFR BLDV: 68 %
OXYHGB MFR BLDV: 71 %
OXYHGB MFR BLDV: 77 %
PCO2 BLD: 36 MM HG (ref 35–45)
PCO2 BLD: 42 MM HG (ref 35–45)
PCO2 BLD: 43 MM HG (ref 35–45)
PCO2 BLD: 44 MM HG (ref 35–45)
PCO2 BLD: 45 MM HG (ref 35–45)
PCO2 BLD: 46 MM HG (ref 35–45)
PCO2 BLD: 48 MM HG (ref 35–45)
PCO2 BLD: 50 MM HG (ref 35–45)
PCO2 BLD: 51 MM HG (ref 35–45)
PCO2 BLDV: 44 MM HG (ref 40–50)
PCO2 BLDV: 45 MM HG (ref 40–50)
PCO2 BLDV: 49 MM HG (ref 40–50)
PCO2 BLDV: 49 MM HG (ref 40–50)
PCO2 BLDV: 52 MM HG (ref 40–50)
PCO2 BLDV: 52 MM HG (ref 40–50)
PCO2 BLDV: 53 MM HG (ref 40–50)
PCO2 BLDV: 54 MM HG (ref 40–50)
PH BLD: 7.27 PH (ref 7.35–7.45)
PH BLD: 7.31 PH (ref 7.35–7.45)
PH BLD: 7.32 PH (ref 7.35–7.45)
PH BLD: 7.33 PH (ref 7.35–7.45)
PH BLD: 7.33 PH (ref 7.35–7.45)
PH BLD: 7.34 PH (ref 7.35–7.45)
PH BLD: 7.35 PH (ref 7.35–7.45)
PH BLD: 7.36 PH (ref 7.35–7.45)
PH BLD: 7.39 PH (ref 7.35–7.45)
PH BLDV: 7.27 PH (ref 7.32–7.43)
PH BLDV: 7.28 PH (ref 7.32–7.43)
PH BLDV: 7.3 PH (ref 7.32–7.43)
PH BLDV: 7.36 PH (ref 7.32–7.43)
PH BLDV: 7.37 PH (ref 7.32–7.43)
PHOSPHATE SERPL-MCNC: 6.8 MG/DL (ref 2.5–4.5)
PLATELET # BLD AUTO: 141 10E9/L (ref 150–450)
PLATELET # BLD AUTO: 145 10E9/L (ref 150–450)
PLATELET # BLD AUTO: 156 10E9/L (ref 150–450)
PO2 BLD: 110 MM HG (ref 80–105)
PO2 BLD: 120 MM HG (ref 80–105)
PO2 BLD: 125 MM HG (ref 80–105)
PO2 BLD: 170 MM HG (ref 80–105)
PO2 BLD: 212 MM HG (ref 80–105)
PO2 BLD: 410 MM HG (ref 80–105)
PO2 BLD: 438 MM HG (ref 80–105)
PO2 BLD: 560 MM HG (ref 80–105)
PO2 BLD: 75 MM HG (ref 80–105)
PO2 BLD: 87 MM HG (ref 80–105)
PO2 BLD: 97 MM HG (ref 80–105)
PO2 BLDV: 30 MM HG (ref 25–47)
PO2 BLDV: 34 MM HG (ref 25–47)
PO2 BLDV: 35 MM HG (ref 25–47)
PO2 BLDV: 35 MM HG (ref 25–47)
PO2 BLDV: 39 MM HG (ref 25–47)
PO2 BLDV: 44 MM HG (ref 25–47)
PO2 BLDV: 46 MM HG (ref 25–47)
PO2 BLDV: 53 MM HG (ref 25–47)
POTASSIUM BLD-SCNC: 3.1 MMOL/L (ref 3.4–5.3)
POTASSIUM BLD-SCNC: 3.1 MMOL/L (ref 3.4–5.3)
POTASSIUM BLD-SCNC: 3.2 MMOL/L (ref 3.4–5.3)
POTASSIUM BLD-SCNC: 3.3 MMOL/L (ref 3.4–5.3)
POTASSIUM BLD-SCNC: 3.5 MMOL/L (ref 3.4–5.3)
POTASSIUM BLD-SCNC: 4 MMOL/L (ref 3.4–5.3)
POTASSIUM BLD-SCNC: 5 MMOL/L (ref 3.4–5.3)
POTASSIUM SERPL-SCNC: 3.2 MMOL/L (ref 3.4–5.3)
POTASSIUM SERPL-SCNC: 4.2 MMOL/L (ref 3.4–5.3)
POTASSIUM SERPL-SCNC: 4.5 MMOL/L (ref 3.4–5.3)
POTASSIUM SERPL-SCNC: 4.9 MMOL/L (ref 3.4–5.3)
PROT SERPL-MCNC: 6.4 G/DL (ref 6.8–8.8)
RBC # BLD AUTO: 3.66 10E12/L (ref 4.4–5.9)
RBC # BLD AUTO: 3.68 10E12/L (ref 4.4–5.9)
RBC # BLD AUTO: 3.71 10E12/L (ref 4.4–5.9)
SODIUM BLD-SCNC: 137 MMOL/L (ref 133–144)
SODIUM BLD-SCNC: 137 MMOL/L (ref 133–144)
SODIUM BLD-SCNC: 138 MMOL/L (ref 133–144)
SODIUM BLD-SCNC: 139 MMOL/L (ref 133–144)
SODIUM BLD-SCNC: 139 MMOL/L (ref 133–144)
SODIUM BLD-SCNC: 140 MMOL/L (ref 133–144)
SODIUM BLD-SCNC: 144 MMOL/L (ref 133–144)
SODIUM SERPL-SCNC: 142 MMOL/L (ref 133–144)
SODIUM SERPL-SCNC: 142 MMOL/L (ref 133–144)
SODIUM SERPL-SCNC: 143 MMOL/L (ref 133–144)
SPECIMEN SOURCE: NORMAL
T GONDII IGG SER QL: <3 IU/ML (ref 0–7.1)
WBC # BLD AUTO: 13.4 10E9/L (ref 4–11)
WBC # BLD AUTO: 22.1 10E9/L (ref 4–11)
WBC # BLD AUTO: 23.2 10E9/L (ref 4–11)

## 2019-02-25 PROCEDURE — 80048 BASIC METABOLIC PNL TOTAL CA: CPT | Performed by: STUDENT IN AN ORGANIZED HEALTH CARE EDUCATION/TRAINING PROGRAM

## 2019-02-25 PROCEDURE — 83605 ASSAY OF LACTIC ACID: CPT | Performed by: INTERNAL MEDICINE

## 2019-02-25 PROCEDURE — 97168 OT RE-EVAL EST PLAN CARE: CPT | Mod: GO | Performed by: OCCUPATIONAL THERAPIST

## 2019-02-25 PROCEDURE — 25000128 H RX IP 250 OP 636

## 2019-02-25 PROCEDURE — 00000146 ZZHCL STATISTIC GLUCOSE BY METER IP

## 2019-02-25 PROCEDURE — 25000128 H RX IP 250 OP 636: Performed by: ANESTHESIOLOGY

## 2019-02-25 PROCEDURE — 3E043XZ INTRODUCTION OF VASOPRESSOR INTO CENTRAL VEIN, PERCUTANEOUS APPROACH: ICD-10-PCS | Performed by: INTERNAL MEDICINE

## 2019-02-25 PROCEDURE — 40000275 ZZH STATISTIC RCP TIME EA 10 MIN

## 2019-02-25 PROCEDURE — 83735 ASSAY OF MAGNESIUM: CPT | Performed by: STUDENT IN AN ORGANIZED HEALTH CARE EDUCATION/TRAINING PROGRAM

## 2019-02-25 PROCEDURE — 25000125 ZZHC RX 250: Performed by: SURGERY

## 2019-02-25 PROCEDURE — P9041 ALBUMIN (HUMAN),5%, 50ML: HCPCS | Performed by: SURGERY

## 2019-02-25 PROCEDURE — 83735 ASSAY OF MAGNESIUM: CPT | Performed by: PHYSICIAN ASSISTANT

## 2019-02-25 PROCEDURE — 25000132 ZZH RX MED GY IP 250 OP 250 PS 637: Performed by: STUDENT IN AN ORGANIZED HEALTH CARE EDUCATION/TRAINING PROGRAM

## 2019-02-25 PROCEDURE — 20000004 ZZH R&B ICU UMMC

## 2019-02-25 PROCEDURE — 40000048 ZZH STATISTIC DAILY SWAN MONITORING

## 2019-02-25 PROCEDURE — 25000125 ZZHC RX 250: Performed by: STUDENT IN AN ORGANIZED HEALTH CARE EDUCATION/TRAINING PROGRAM

## 2019-02-25 PROCEDURE — 84132 ASSAY OF SERUM POTASSIUM: CPT | Performed by: PHYSICIAN ASSISTANT

## 2019-02-25 PROCEDURE — 85610 PROTHROMBIN TIME: CPT | Performed by: STUDENT IN AN ORGANIZED HEALTH CARE EDUCATION/TRAINING PROGRAM

## 2019-02-25 PROCEDURE — 25800030 ZZH RX IP 258 OP 636: Performed by: INTERNAL MEDICINE

## 2019-02-25 PROCEDURE — 93010 ELECTROCARDIOGRAM REPORT: CPT | Performed by: INTERNAL MEDICINE

## 2019-02-25 PROCEDURE — 85027 COMPLETE CBC AUTOMATED: CPT | Performed by: STUDENT IN AN ORGANIZED HEALTH CARE EDUCATION/TRAINING PROGRAM

## 2019-02-25 PROCEDURE — 87641 MR-STAPH DNA AMP PROBE: CPT | Performed by: STUDENT IN AN ORGANIZED HEALTH CARE EDUCATION/TRAINING PROGRAM

## 2019-02-25 PROCEDURE — 25000125 ZZHC RX 250: Performed by: INTERNAL MEDICINE

## 2019-02-25 PROCEDURE — 87640 STAPH A DNA AMP PROBE: CPT | Performed by: STUDENT IN AN ORGANIZED HEALTH CARE EDUCATION/TRAINING PROGRAM

## 2019-02-25 PROCEDURE — 25000128 H RX IP 250 OP 636: Performed by: INTERNAL MEDICINE

## 2019-02-25 PROCEDURE — 25000128 H RX IP 250 OP 636: Performed by: STUDENT IN AN ORGANIZED HEALTH CARE EDUCATION/TRAINING PROGRAM

## 2019-02-25 PROCEDURE — 71045 X-RAY EXAM CHEST 1 VIEW: CPT

## 2019-02-25 PROCEDURE — 25000125 ZZHC RX 250: Performed by: NURSE ANESTHETIST, CERTIFIED REGISTERED

## 2019-02-25 PROCEDURE — 80053 COMPREHEN METABOLIC PANEL: CPT | Performed by: STUDENT IN AN ORGANIZED HEALTH CARE EDUCATION/TRAINING PROGRAM

## 2019-02-25 PROCEDURE — 85027 COMPLETE CBC AUTOMATED: CPT | Performed by: PHYSICIAN ASSISTANT

## 2019-02-25 PROCEDURE — 25000128 H RX IP 250 OP 636: Performed by: SURGERY

## 2019-02-25 PROCEDURE — 40000014 ZZH STATISTIC ARTERIAL MONITORING DAILY

## 2019-02-25 PROCEDURE — 82803 BLOOD GASES ANY COMBINATION: CPT | Performed by: STUDENT IN AN ORGANIZED HEALTH CARE EDUCATION/TRAINING PROGRAM

## 2019-02-25 PROCEDURE — 97530 THERAPEUTIC ACTIVITIES: CPT | Mod: GO | Performed by: OCCUPATIONAL THERAPIST

## 2019-02-25 PROCEDURE — 25000128 H RX IP 250 OP 636: Performed by: PHYSICIAN ASSISTANT

## 2019-02-25 PROCEDURE — 85730 THROMBOPLASTIN TIME PARTIAL: CPT | Performed by: STUDENT IN AN ORGANIZED HEALTH CARE EDUCATION/TRAINING PROGRAM

## 2019-02-25 PROCEDURE — 83605 ASSAY OF LACTIC ACID: CPT | Performed by: STUDENT IN AN ORGANIZED HEALTH CARE EDUCATION/TRAINING PROGRAM

## 2019-02-25 PROCEDURE — 25000128 H RX IP 250 OP 636: Performed by: NURSE ANESTHETIST, CERTIFIED REGISTERED

## 2019-02-25 PROCEDURE — 25800030 ZZH RX IP 258 OP 636: Performed by: STUDENT IN AN ORGANIZED HEALTH CARE EDUCATION/TRAINING PROGRAM

## 2019-02-25 PROCEDURE — 99233 SBSQ HOSP IP/OBS HIGH 50: CPT | Mod: 25 | Performed by: INTERNAL MEDICINE

## 2019-02-25 PROCEDURE — 25800030 ZZH RX IP 258 OP 636: Performed by: NURSE ANESTHETIST, CERTIFIED REGISTERED

## 2019-02-25 PROCEDURE — 93005 ELECTROCARDIOGRAM TRACING: CPT

## 2019-02-25 PROCEDURE — 80048 BASIC METABOLIC PNL TOTAL CA: CPT | Performed by: PHYSICIAN ASSISTANT

## 2019-02-25 PROCEDURE — 40000196 ZZH STATISTIC RAPCV CVP MONITORING

## 2019-02-25 PROCEDURE — 25000128 H RX IP 250 OP 636: Performed by: NURSE PRACTITIONER

## 2019-02-25 PROCEDURE — 84100 ASSAY OF PHOSPHORUS: CPT | Performed by: STUDENT IN AN ORGANIZED HEALTH CARE EDUCATION/TRAINING PROGRAM

## 2019-02-25 PROCEDURE — 25000132 ZZH RX MED GY IP 250 OP 250 PS 637: Performed by: INTERNAL MEDICINE

## 2019-02-25 PROCEDURE — 82805 BLOOD GASES W/O2 SATURATION: CPT | Performed by: PHYSICIAN ASSISTANT

## 2019-02-25 PROCEDURE — 82805 BLOOD GASES W/O2 SATURATION: CPT | Performed by: THORACIC SURGERY (CARDIOTHORACIC VASCULAR SURGERY)

## 2019-02-25 PROCEDURE — 94003 VENT MGMT INPAT SUBQ DAY: CPT

## 2019-02-25 PROCEDURE — 82805 BLOOD GASES W/O2 SATURATION: CPT | Performed by: INTERNAL MEDICINE

## 2019-02-25 PROCEDURE — 82330 ASSAY OF CALCIUM: CPT | Performed by: INTERNAL MEDICINE

## 2019-02-25 PROCEDURE — P9041 ALBUMIN (HUMAN),5%, 50ML: HCPCS

## 2019-02-25 RX ORDER — VANCOMYCIN HYDROCHLORIDE 1 G/200ML
1000 INJECTION, SOLUTION INTRAVENOUS EVERY 24 HOURS
Status: COMPLETED | OUTPATIENT
Start: 2019-02-26 | End: 2019-02-28

## 2019-02-25 RX ORDER — FUROSEMIDE 10 MG/ML
40 INJECTION INTRAMUSCULAR; INTRAVENOUS ONCE
Status: COMPLETED | OUTPATIENT
Start: 2019-02-25 | End: 2019-02-25

## 2019-02-25 RX ORDER — METHYLPREDNISOLONE SODIUM SUCCINATE 40 MG/ML
20 INJECTION, POWDER, LYOPHILIZED, FOR SOLUTION INTRAMUSCULAR; INTRAVENOUS EVERY 12 HOURS
Status: DISCONTINUED | OUTPATIENT
Start: 2019-02-27 | End: 2019-02-27

## 2019-02-25 RX ORDER — ALBUMIN, HUMAN INJ 5% 5 %
SOLUTION INTRAVENOUS
Status: COMPLETED
Start: 2019-02-25 | End: 2019-02-25

## 2019-02-25 RX ORDER — METHYLPREDNISOLONE SODIUM SUCCINATE 40 MG/ML
16 INJECTION, POWDER, LYOPHILIZED, FOR SOLUTION INTRAMUSCULAR; INTRAVENOUS EVERY 12 HOURS
Status: DISCONTINUED | OUTPATIENT
Start: 2019-03-01 | End: 2019-02-27

## 2019-02-25 RX ORDER — LIDOCAINE 4 G/G
1 PATCH TOPICAL
Status: DISCONTINUED | OUTPATIENT
Start: 2019-02-25 | End: 2019-03-11

## 2019-02-25 RX ORDER — NICOTINE POLACRILEX 4 MG
15-30 LOZENGE BUCCAL
Status: DISCONTINUED | OUTPATIENT
Start: 2019-02-25 | End: 2019-02-27

## 2019-02-25 RX ORDER — CEFAZOLIN SODIUM 1 G/3ML
1 INJECTION, POWDER, FOR SOLUTION INTRAMUSCULAR; INTRAVENOUS EVERY 8 HOURS
Status: DISCONTINUED | OUTPATIENT
Start: 2019-02-25 | End: 2019-02-25

## 2019-02-25 RX ORDER — VANCOMYCIN HYDROCHLORIDE 1 G/200ML
1000 INJECTION, SOLUTION INTRAVENOUS EVERY 24 HOURS
Status: DISCONTINUED | OUTPATIENT
Start: 2019-02-25 | End: 2019-02-25

## 2019-02-25 RX ORDER — FLUTICASONE PROPIONATE 220 UG/1
2 AEROSOL, METERED RESPIRATORY (INHALATION) EVERY 12 HOURS
Status: DISCONTINUED | OUTPATIENT
Start: 2019-02-25 | End: 2019-04-03 | Stop reason: HOSPADM

## 2019-02-25 RX ORDER — METHYLPREDNISOLONE SODIUM SUCCINATE 125 MG/2ML
125 INJECTION, POWDER, LYOPHILIZED, FOR SOLUTION INTRAMUSCULAR; INTRAVENOUS EVERY 8 HOURS
Status: DISCONTINUED | OUTPATIENT
Start: 2019-02-25 | End: 2019-02-25

## 2019-02-25 RX ORDER — METHYLPREDNISOLONE SODIUM SUCCINATE 40 MG/ML
24 INJECTION, POWDER, LYOPHILIZED, FOR SOLUTION INTRAMUSCULAR; INTRAVENOUS EVERY 12 HOURS
Status: COMPLETED | OUTPATIENT
Start: 2019-02-25 | End: 2019-02-26

## 2019-02-25 RX ORDER — PIPERACILLIN SODIUM, TAZOBACTAM SODIUM 3; .375 G/15ML; G/15ML
3.38 INJECTION, POWDER, LYOPHILIZED, FOR SOLUTION INTRAVENOUS EVERY 6 HOURS
Status: COMPLETED | OUTPATIENT
Start: 2019-02-25 | End: 2019-02-28

## 2019-02-25 RX ORDER — TACROLIMUS 0.5 MG/1
0.5 CAPSULE ORAL
Status: DISCONTINUED | OUTPATIENT
Start: 2019-02-25 | End: 2019-02-25

## 2019-02-25 RX ORDER — ALBUMIN, HUMAN INJ 5% 5 %
12.5 SOLUTION INTRAVENOUS ONCE
Status: COMPLETED | OUTPATIENT
Start: 2019-02-25 | End: 2019-02-25

## 2019-02-25 RX ORDER — HYDROMORPHONE HYDROCHLORIDE 1 MG/ML
INJECTION, SOLUTION INTRAMUSCULAR; INTRAVENOUS; SUBCUTANEOUS
Status: COMPLETED
Start: 2019-02-25 | End: 2019-02-25

## 2019-02-25 RX ORDER — PIPERACILLIN SODIUM, TAZOBACTAM SODIUM 3; .375 G/15ML; G/15ML
3.38 INJECTION, POWDER, LYOPHILIZED, FOR SOLUTION INTRAVENOUS EVERY 6 HOURS
Status: DISCONTINUED | OUTPATIENT
Start: 2019-02-25 | End: 2019-02-25

## 2019-02-25 RX ORDER — PREDNISONE 20 MG/1
20 TABLET ORAL 2 TIMES DAILY WITH MEALS
Status: DISCONTINUED | OUTPATIENT
Start: 2019-03-01 | End: 2019-02-25

## 2019-02-25 RX ORDER — HYDROMORPHONE HYDROCHLORIDE 1 MG/ML
.3-.5 INJECTION, SOLUTION INTRAMUSCULAR; INTRAVENOUS; SUBCUTANEOUS
Status: DISCONTINUED | OUTPATIENT
Start: 2019-02-25 | End: 2019-02-26

## 2019-02-25 RX ORDER — DEXTROSE MONOHYDRATE 25 G/50ML
25-50 INJECTION, SOLUTION INTRAVENOUS
Status: DISCONTINUED | OUTPATIENT
Start: 2019-02-25 | End: 2019-02-27

## 2019-02-25 RX ORDER — LIDOCAINE 40 MG/G
CREAM TOPICAL
Status: DISCONTINUED | OUTPATIENT
Start: 2019-02-25 | End: 2019-02-25

## 2019-02-25 RX ORDER — NALOXONE HYDROCHLORIDE 0.4 MG/ML
.1-.4 INJECTION, SOLUTION INTRAMUSCULAR; INTRAVENOUS; SUBCUTANEOUS
Status: DISCONTINUED | OUTPATIENT
Start: 2019-02-25 | End: 2019-02-25

## 2019-02-25 RX ORDER — MYCOPHENOLATE MOFETIL 250 MG/1
1500 CAPSULE ORAL
Status: DISCONTINUED | OUTPATIENT
Start: 2019-02-25 | End: 2019-02-25

## 2019-02-25 RX ADMIN — MYCOPHENOLATE MOFETIL 1500 MG: 500 INJECTION, POWDER, LYOPHILIZED, FOR SOLUTION INTRAVENOUS at 20:17

## 2019-02-25 RX ADMIN — ALBUMIN HUMAN 12.5 G: 0.05 INJECTION, SOLUTION INTRAVENOUS at 07:59

## 2019-02-25 RX ADMIN — ALBUMIN HUMAN 12.5 G: 50 SOLUTION INTRAVENOUS at 07:59

## 2019-02-25 RX ADMIN — Medication 0.5 MG: at 11:47

## 2019-02-25 RX ADMIN — LIDOCAINE 1 PATCH: 560 PATCH PERCUTANEOUS; TOPICAL; TRANSDERMAL at 13:07

## 2019-02-25 RX ADMIN — FUROSEMIDE 20 MG: 10 INJECTION, SOLUTION INTRAVENOUS at 17:42

## 2019-02-25 RX ADMIN — PROPOFOL 40 MCG/KG/MIN: 10 INJECTION, EMULSION INTRAVENOUS at 01:37

## 2019-02-25 RX ADMIN — CEFAZOLIN 1 G: 330 INJECTION, POWDER, FOR SOLUTION INTRAMUSCULAR; INTRAVENOUS at 04:00

## 2019-02-25 RX ADMIN — PIPERACILLIN AND TAZOBACTAM 3.38 G: 3; .375 INJECTION, POWDER, FOR SOLUTION INTRAVENOUS at 13:30

## 2019-02-25 RX ADMIN — OXYCODONE HYDROCHLORIDE 5 MG: 5 TABLET ORAL at 09:46

## 2019-02-25 RX ADMIN — ISOPROTERENOL HYDROCHLORIDE 0.08 MCG/KG/MIN: 0.2 INJECTION, SOLUTION INTRAMUSCULAR; INTRAVENOUS at 06:44

## 2019-02-25 RX ADMIN — MYCOPHENOLATE MOFETIL 1500 MG: 500 INJECTION, POWDER, LYOPHILIZED, FOR SOLUTION INTRAVENOUS at 08:35

## 2019-02-25 RX ADMIN — HUMAN INSULIN 1 UNITS/HR: 100 INJECTION, SOLUTION SUBCUTANEOUS at 19:40

## 2019-02-25 RX ADMIN — PIPERACILLIN SODIUM AND TAZOBACTAM SODIUM 3.38 G: 3; .375 INJECTION, POWDER, LYOPHILIZED, FOR SOLUTION INTRAVENOUS at 07:59

## 2019-02-25 RX ADMIN — METHYLPREDNISOLONE SODIUM SUCCINATE 24 MG: 40 INJECTION, POWDER, LYOPHILIZED, FOR SOLUTION INTRAMUSCULAR; INTRAVENOUS at 18:40

## 2019-02-25 RX ADMIN — METHYLPREDNISOLONE 125 MG: 125 INJECTION, POWDER, LYOPHILIZED, FOR SOLUTION INTRAMUSCULAR; INTRAVENOUS at 11:53

## 2019-02-25 RX ADMIN — LEVOTHYROXINE SODIUM 100 MCG: 100 TABLET ORAL at 09:46

## 2019-02-25 RX ADMIN — METHYLPREDNISOLONE 125 MG: 125 INJECTION, POWDER, LYOPHILIZED, FOR SOLUTION INTRAMUSCULAR; INTRAVENOUS at 03:18

## 2019-02-25 RX ADMIN — ALBUMIN HUMAN 12.5 G: 0.05 INJECTION, SOLUTION INTRAVENOUS at 02:09

## 2019-02-25 RX ADMIN — FLUTICASONE PROPIONATE 2 PUFF: 220 AEROSOL, METERED RESPIRATORY (INHALATION) at 19:25

## 2019-02-25 RX ADMIN — EPINEPHRINE 0.02 MCG/KG/MIN: 1 INJECTION PARENTERAL at 22:26

## 2019-02-25 RX ADMIN — POTASSIUM CHLORIDE 20 MEQ: 400 INJECTION, SOLUTION INTRAVENOUS at 06:34

## 2019-02-25 RX ADMIN — ISOPROTERENOL HYDROCHLORIDE 0.04 MCG/KG/MIN: 0.2 INJECTION, SOLUTION INTRAMUSCULAR; INTRAVENOUS at 02:20

## 2019-02-25 RX ADMIN — Medication 0.5 MG: at 15:19

## 2019-02-25 RX ADMIN — VANCOMYCIN HYDROCHLORIDE 1000 MG: 1 INJECTION, SOLUTION INTRAVENOUS at 07:56

## 2019-02-25 RX ADMIN — Medication 0.3 MG: at 13:07

## 2019-02-25 RX ADMIN — HUMAN INSULIN 12 UNITS/HR: 100 INJECTION, SOLUTION SUBCUTANEOUS at 05:46

## 2019-02-25 RX ADMIN — HUMAN INSULIN 6 UNITS/HR: 100 INJECTION, SOLUTION SUBCUTANEOUS at 01:52

## 2019-02-25 RX ADMIN — PIPERACILLIN AND TAZOBACTAM 3.38 G: 3; .375 INJECTION, POWDER, FOR SOLUTION INTRAVENOUS at 19:36

## 2019-02-25 RX ADMIN — POTASSIUM CHLORIDE 20 MEQ: 400 INJECTION, SOLUTION INTRAVENOUS at 05:09

## 2019-02-25 RX ADMIN — ISOPROTERENOL HYDROCHLORIDE 0.03 MCG/KG/MIN: 0.2 INJECTION, SOLUTION INTRAMUSCULAR; INTRAVENOUS at 18:32

## 2019-02-25 RX ADMIN — FUROSEMIDE 20 MG: 10 INJECTION, SOLUTION INTRAVENOUS at 20:55

## 2019-02-25 ASSESSMENT — ACTIVITIES OF DAILY LIVING (ADL)
ADLS_ACUITY_SCORE: 13
ADLS_ACUITY_SCORE: 15
ADLS_ACUITY_SCORE: 15
ADLS_ACUITY_SCORE: 14
ADLS_ACUITY_SCORE: 13
ADLS_ACUITY_SCORE: 13
IADL_COMMENTS: FAMILY ABLE TO ASSIST WITH IADLS PRN

## 2019-02-25 ASSESSMENT — MIFFLIN-ST. JEOR: SCORE: 1389.5

## 2019-02-25 NOTE — PHARMACY-VANCOMYCIN DOSING SERVICE
Pharmacy Vancomycin Initial Note  Date of Service 2019  Patient's  1965  53 year old, male    Indication: Surgical Prophylaxis    Current estimated CrCl = Estimated Creatinine Clearance: 54.8 mL/min (based on SCr of 1.25 mg/dL).    Creatinine for last 3 days  2019:  4:09 PM Creatinine 0.87 mg/dL  2019:  3:32 AM Creatinine 0.88 mg/dL;  5:05 PM Creatinine 1.04 mg/dL;  6:35 PM Creatinine 1.03 mg/dL  2019:  3:40 AM Creatinine 0.90 mg/dL;  6:17 PM Creatinine 1.06 mg/dL  2019:  4:07 AM Creatinine 1.25 mg/dL    Recent Vancomycin Level(s) for last 3 days  No results found for requested labs within last 72 hours.      Vancomycin IV Administrations (past 72 hours)                   vancomycin 1000 mg in Dextrose 5% 200 mL (g) 1 g Given 19 1054                Nephrotoxins and other renal medications (From now, onward)    Start     Dose/Rate Route Frequency Ordered Stop    19 0745  piperacillin-tazobactam (ZOSYN) 3.375 g vial to attach to  mL bag      3.375 g  over 30 Minutes Intravenous EVERY 6 HOURS 19 0731      19 0745  vancomycin (VANCOCIN) 1000 mg in dextrose 5% 200 mL PREMIX      1,000 mg  200 mL/hr over 1 Hours Intravenous EVERY 24 HOURS 19 0738      19 0745  norepinephrine (LEVOPHED) 16 mg in D5W 250 mL infusion      0.03-0.4 mcg/kg/min × 54.7 kg  1.5-20.5 mL/hr  Intravenous CONTINUOUS 19 0743            Contrast Orders - past 72 hours (72h ago, onward)    None                Plan:  1.  Start vancomycin  1000 mg IV q24h.   2.  Goal Trough Level: 15-20 mg/L   3.  Pharmacy will check trough levels as appropriate in 1-3 Days.    4. Serum creatinine levels will be ordered daily for the first week of therapy and at least twice weekly for subsequent weeks.    5. [place] method utilized to dose vancomycin therapy: Method 2    Dyana Alejo, PharmD  Pager [phone]

## 2019-02-25 NOTE — PROGRESS NOTES
"CLINICAL NUTRITION SERVICES - ASSESSMENT NOTE     Nutrition Prescription    RECOMMENDATIONS FOR MDs/PROVIDERS TO ORDER:  If pt cannot advance diet within the next 1-2 days, would recommend initiating enteral nutrition. Nutrition consult = \"RD to Assess and Order TF per MNT protocol\"    Malnutrition Status:    Severe malnutrition in the context of acute illness    Recommendations already ordered by Registered Dietitian (RD):  None at this time    Future/Additional Recommendations:  1. If pt with poor PO intake once diet advanced, offer ONS and initiate calorie counts    2. If enteral nutrition indicated, would recommend:  -Nutren 1.5 @ 10 mL/hr  -If pt tolerates, adv TF per MD discretion to goal 55 mL/hr to provide 1980 kcals (36 kcal/kg/day), 90 g PRO (1.6 g/kg/day), 1003 mL H2O, 232 g CHO and no fiber daily.  -Order multivitamin/mineral (15 ml/day via FT) to help ensure micronutrient needs being met with suspected hypermetabolic demands and potential interruptions to TF infusions.  -30 mL water flush q4h for tube patency       REASON FOR ASSESSMENT  Everton Larios is a/an 53 year old male assessed by the dietitian for LOS, s/p heart transplant    NUTRITION HISTORY  Unable to obtain r/t pt sleeping during visit without family at the bedside    CURRENT NUTRITION ORDERS  Diet: NPO; was previously was on Low Sat Fat, <2400 mg Na  Intake/Tolerance: Per RN charting, pt was eating 100% of meals with good appetite prior to intubation    LABS  2/25:  K = 3.2 (L)  Mg = 2.7 (H)  Lactic Acid = 2.4 (H)    MEDICATIONS  Epinephrine    ANTHROPOMETRICS  Height: 172.7 cm (5' 8\")  Most Recent Weight: 58 kg (127 lb 13.9 oz)    IBW: 70 kg  BMI: 19.4; Normal BMI  Weight History:   Wt Readings from Last 10 Encounters:   02/25/19 58 kg (127 lb 13.9 oz)   01/30/19 65 kg (143 lb 4.8 oz)   01/14/19 63 kg (138 lb 12.8 oz)   01/02/19 63.9 kg (140 lb 14.4 oz)   12/19/18 64.4 kg (142 lb)   11/28/18 63.5 kg (140 lb)   11/23/18 58.7 kg (129 " lb 6.4 oz)   11/15/18 60.7 kg (133 lb 14.4 oz)   11/02/18 65.8 kg (145 lb)   10/11/18 72.5 kg (159 lb 12.8 oz)   8.3 kg, 13% weight loss over the past month  Dosing Weight: 55 kg (actual based on lowest admit wt of 54.7 kg on 2/24, IBW = 70 kg)    ASSESSED NUTRITION NEEDS  Estimated Energy Needs: 3125-5812+ kcals/day (30 - 35+ kcals/kg )  Justification: Increased needs and Post-tx  Estimated Protein Needs:  grams protein/day (1.5 - 2 grams of pro/kg)  Justification: Increased needs and Post-tx  Estimated Fluid Needs: (1 mL/kcal)   Justification: Per provider pending fluid status    PHYSICAL FINDINGS  See malnutrition section below.  Poor skin turgor     MALNUTRITION  % Intake: Decreased intake does not meet criteria  % Weight Loss: > 5% in 1 month (severe)  Subcutaneous Fat Loss: Facial region, Upper arm, Lower arm and Thoracic/intercostal:  Mild  Muscle Loss: Temporal, Thoracic region (clavicle, acromium bone, deltoid, trapezius, pectoral), Upper arm (bicep, tricep, Lower arm  (forearm), Upper leg (quadricep, hamstring), Patellar region and Posterior calf:  Mild-Moderate  Fluid Accumulation/Edema: Does not meet criteria (trace)  Malnutrition Diagnosis: Severe malnutrition in the context of acute illness    NUTRITION DIAGNOSIS  Inadequate protein-energy intake related to intubation as evidenced by NPO day 2 without plan to start TFs in hopes of extubation    INTERVENTIONS  Implementation  Nutrition Education: Unable to complete due to pt sleeping   Enteral Nutrition - Recs     Goals  Diet adv v nutrition support within 1-2 days.  Total avg nutritional intake to meet a minimum of 30 kcal/kg and 1.5 g PRO/kg daily (per dosing wt 55 kg).     Monitoring/Evaluation  Progress toward goals will be monitored and evaluated per protocol.    Jazzy Balbuena, RD, LD  SICU RD Pgr: [phone]  CVICU RD Pgr: [phone]

## 2019-02-25 NOTE — PROGRESS NOTES
Pt extubated successfully at 1008, by RT. After pressure supporting for 90 min 7/5 40%. Placed on 4 L NC with O2 sats 98%, breath sounds clear and diminished bilaterally, push IS. RR 20. Awaiting capnography monitoring, end tidal CO2 36 Pt alert and oriented.     Sharmaine Saab RN on 2/25/2019 at 10:17 AM

## 2019-02-25 NOTE — PROGRESS NOTES
Care Coordinator Progress Note    Admission Date/Time:  2/17/2019  Attending MD:  Yves Rodrigues MD    Data  Chart reviewed, discussed with interdisciplinary team.   Patient was admitted for:    Acute on chronic systolic congestive heart failure (H)  Chronic systolic heart failure (H).     Assessment  Pt had heart transplant yesterday, 2/24 and extubated today.  PT/OT are following.  RNCC will cont to follow plan of care.     Plan  Anticipated Discharge Date:  TBD.  Anticipated Discharge Plan:   TBD.  RNCC will cont to follow plan of care.      Lucía Preston RN, PHN, BSN  4A and 4E/ ICU  Care Coordinator  Phone: [phone]  Pager: [phone]

## 2019-02-25 NOTE — PROGRESS NOTES
Final Crossmatch   Final crossmatch results for UNOS ID [id number] and recipient sample date 02/15/2019 and 02/23/2019  were both T cell and B cell, allo and auto negative.  DSA not noted.

## 2019-02-25 NOTE — PROGRESS NOTES
02/25/19 1300   Quick Adds   Type of Visit Occupational Therapy Re-evaluation   Living Environment   Lives With alone   Living Arrangements (townhome)   Home Accessibility no concerns   Transportation Anticipated car, drives self   Living Environment Comment prior living environment unchanged from initial evaluation; Prior to admission pt lived alone in a 1 level townhome; no stairs to access;  Pt reports he intends to discharge to his parents home when the time comes and states they live in a 2 story home with 5 stairs to enter; bedroom on upper level, bathroom on main and upper levels   Self-Care   Usual Activity Tolerance good   Current Activity Tolerance fair   Regular Exercise No   Activity/Exercise/Self-Care Comment Prior level of function unchanged from initial evaluation dated 2/21/19; please see for details   Functional Level   Ambulation 0-->independent   Transferring 0-->independent   Toileting 0-->independent   Bathing 0-->independent   Dressing 0-->independent   Cognition 0 - no cognition issues reported   Fall history within last six months no   Which of the above functional risks had a recent onset or change? ambulation;transferring;toileting;bathing;dressing;cognition       Present no   Language english   General Information   Onset of Illness/Injury or Date of Surgery - Date 02/24/19  (date of heart transplant)   Referring Physician Constantin Rockwell MD   Patient/Family Goals Statement return to home; manage pain   Additional Occupational Profile Info/Pertinent History of Current Problem Everton Larios is a 54 yo gentleman with a h/o NICM, HFrEF (EF 15%) s/p ICD placement (2008, generator change 2018), congenital ASD repair in childhood, paroxysmal atrial fibrillation s/p ablation and cardioversion, hypothyroidism, EtOH abuse, ulcerative colitis, remote history of GI bleed with splenic embolization, and a recent admission in 11/2018 for ambulatory cardiogenic shock requiring  initiation of IV inotropes, who was admitted for heart transplant; now s/p heart transplant 2/24/19   Precautions/Limitations fall precautions;sternal precautions   Heart Disease Risk Factors Medical history;Gender   General Observations Pt recently extubated; on 4L O2 via nc, swan, a-line, CT x 3, maddox, PIV   General Info Comments Activity: up with assist   Cognitive Status Examination   Orientation person;place   Level of Consciousness alert;lethargic/somnolent   Follows Commands (Cognition) 50-74% accuracy;increased processing time needed   Memory impaired   Attention Sustained attention impaired   Cognitive Comment pt alert; generally drowsy; making verbalizations though somewhat unclear at times; able to make needs known; following most simple commands   Visual Perception   Visual Perception Wears glasses   Visual Perception Comments no acute visual changes noted   Sensory Examination   Sensory Quick Adds No deficits were identified   Pain Assessment   Patient Currently in Pain (yes; pt repeatedly c/o sternal pain)   Range of Motion (ROM)   ROM Comment BUE AROM impaired;  pt with difficulty using BUE functionally during session   Strength   Strength Comments not formally assessed 2/2 post surgical precautions; pt demonstrates generalized weakness during functional tasks post op   Coordination   Coordination Comments impaired; dropping pillow throughout session   Mobility   Bed Mobility Bed mobility skill: Supine to sit   Bed Mobility Skill: Supine to Sit   Level of [place]: Supine/Sit moderate assist (50% patients effort)   Physical Assist/Nonphysical Assist: Supine/Sit 2 persons   Transfer Skill: Bed to Chair/Chair to Bed   Level of [place]: Bed to Chair moderate assist (50% patients effort)   Physical Assist/Nonphysical Assist: Bed to Chair 1 person + 1 person to manage equipment   Transfer Skill: Sit to Stand   Level of [place]: Sit/Stand moderate assist (50% patients effort)   Physical  Assist/Nonphysical Assist: Sit/Stand 1 person + 1 person to manage equipment;2 persons   Balance   Balance Comments impaired; posterior lean in static standing   Lower Body Dressing   Level of [place]: Dress Lower Body maximum assist (25% patients effort)   Instrumental Activities of Daily Living (IADL)   IADL Comments family able to assist with IADLs prn   Activities of Daily Living Analysis   Impairments Contributing to Impaired Activities of Daily Living balance impaired;cognition impaired;pain;post surgical precautions;ROM decreased;strength decreased   General Therapy Interventions   Planned Therapy Interventions ADL retraining;IADL retraining;bed mobility training;cognition;ROM;strengthening;transfer training;home program guidelines;progressive activity/exercise;risk factor education   Clinical Impression   Criteria for Skilled Therapeutic Interventions Met yes, treatment indicated   OT Diagnosis decreased activity tolerance and independence with ADLs   Influenced by the following impairments post surgical precautions, pain, decreased activity tolerance, weakness, below baseline cognition   Assessment of Occupational Performance 5 or more Performance Deficits   Identified Performance Deficits bed mobility, transfers, toileting, dressing, bathing, mobility   Clinical Decision Making (Complexity) Low complexity   Therapy Frequency daily   Predicted Duration of Therapy Intervention (days/wks) 3/10/19   Anticipated Discharge Disposition Transitional Care Facility   Risks and Benefits of Treatment have been explained. Yes   Patient, Family & other staff in agreement with plan of care Yes   Clinical Impression Comments At this time would recommend discharge to TCU; however, anticipate pt will progress well with therapy; will update recommendation as able   Total Evaluation Time   Total Evaluation Time (Minutes) 3

## 2019-02-25 NOTE — H&P
CV ICU H&P  2/24/2019      CO-MORBIDITIES:   Patient Active Problem List   Diagnosis     H/O congenital atrial septal defect (ASD) repair at age 3     Nonischemic cardiomyopathy (H)     Paroxysmal atrial fibrillation (H)     Ventricular tachycardia (H)     On amiodarone therapy     Ulcerative colitis (H)     ICD, [employer] 2008; gen change 2/2018     S/P ablation of atrial fibrillation     Hypothyroidism due to medication     Chronic systolic heart failure (H)     DJD (degenerative joint disease) - neck     Pierce's esophagus     Intermittent asthma without complication     Chronic rhinitis     Depression     Iron deficiency anemia     Heart failure, chronic, with acute decompensation (H)     Acute on chronic systolic congestive heart failure (H)     Chronic HFrEF (heart failure with reduced ejection fraction) (H)       ASSESSMENT: Everton Larios is a 53 year old male with PMH etoh abuse, hypothyroidism, intermittent asthma, ulcerative colitis, Depression, Chronic HFrEF, NICM admitted in cardiogenic shock requiring subclavian balloon pump now s/p OHT 2/24/19 with Piyush House and Christopher.     PLAN:  Neuro/ pain/ sedation:  - Monitor neurological status. Notify the MD for any acute changes in exam.  - Fentanyl gtt for pain.  - Precedex and Propofol gtt for sedation.    #Depression  PTA cymbalta 20mg    Pulmonary care:   - MV  - Titrate FiO2 for SpO2 >92%  #Intermittent asthma  - PTA Montelukast ordered  Cardiovascular:  HR goal >100  - Monitor hemodynamic status.   - MAP >65  - Epi, NE, Isopreterenol gtt    GI care:   - NPO except ice chips and medications.  #Pierce's esophagus  #Ulcerative colitis  -PTA Mesalamine ordered    Fluids/ Electrolytes/ Nutrition:   - TKO for IV fluid hydration  - No indication for parenteral nutrition.    Renal/ Fluid Balance:    - Urine output is adequate so far.  - Will continue to monitor intake and output.  -PTA Tamsulosin ordered    Endocrine:    # Stress  hyperglycemia  - Insulin gtt    ID/ Antibiotics:  - Complete perioperative antibiotics  - No other indication for antibiotics.     Heme:     - Hgb goal >7    Prophylaxis:    - Mechanical prophylaxis for DVT.  - No chemical DVT prophylaxis due to high risk of bleeding  - Protonix qd    Lines/ tubes/ drains:  - MAC, [place], Arterial line, maddox    Disposition:  - [facility] ICU.     Nicolas Paredes MD  CA-2/PGY-3 Anesthesiology  [phone]      ====================================    HPI:   Per Chart Review Everton Larios is a 52 yo gentleman with a h/o NICM, HFrEF (EF 15%) s/p ICD placement (2008, generator change 2018), congenital ASD repair in childhood, paroxysmal atrial fibrillation s/p ablation and cardioversion, hypothyroidism, EtOH abuse, ulcerative colitis, remote history of GI bleed with splenic embolization, and a recent admission in 11/2018 for ambulatory cardiogenic shock requiring initiation of IV inotropes, who was admitted for heart transplant, now status 2 with a L subclavian balloon pump.    PAST MEDICAL HISTORY:   Past Medical History:   Diagnosis Date     Alcohol abuse      Pierce's esophagus 10/4/2018     Chronic rhinitis 10/4/2018     Chronic systolic heart failure (H) 10/4/2018     Depression 10/4/2018     Diastolic dysfunction      DJD (degenerative joint disease) - neck 10/4/2018     H/O congenital atrial septal defect (ASD) repair at age 3 10/4/2018     Hypothyroidism due to medication 10/4/2018     ICD, [place] [employer] 2008; gen change 2/2018 10/4/2018     Intermittent asthma without complication 10/4/2018     Nonischemic cardiomyopathy (H) 10/4/2018     On amiodarone therapy 10/4/2018     Paroxysmal atrial fibrillation (H) 10/4/2018     S/P ablation of atrial fibrillation 10/4/2018     Systolic heart failure (H)      Ulcerative colitis (H) 10/4/2018     Ventricular tachycardia (H) 10/4/2018       PAST SURGICAL HISTORY:   Past Surgical History:   Procedure Laterality Date     AICD, DUAL CHAMBER   "     INSERT INTRAAORTIC BALLOON PUMP Left 2/19/2019    Procedure: Insert left  Subclavian Balloon Pump,  Removal Right femoral arterial balloom pump sheath;  Surgeon: Dewayne House MD;  Location: [facility] OR     INSERT INTRAAORTIC BALLOON PUMP Left 2/21/2019    Procedure: SUBCLAVIAN BALLOON PUMP PLACEMENT;  Surgeon: Ben White MD;  Location: [facility] OR       FAMILY HISTORY: No family history on file.    SOCIAL HISTORY:   Social History     Tobacco Use     Smoking status: Former Smoker     Years: 10.00     Smokeless tobacco: Never Used   Substance Use Topics     Alcohol use: Yes     Alcohol/week: 0.6 oz     Types: 1 Cans of beer per week     Comment: a couple times a week          OBJECTIVE:   1. VITAL SIGNS:    Blood pressure 91/64, pulse 99, temperature 98  F (36.7  C), temperature source Axillary, resp. rate 18, height 1.727 m (5' 8\"), weight 54.7 kg (120 lb 8 oz), SpO2 100 %.    3. PHYSICAL EXAMINATION:   General: sedated, intubated  Neuro: sedated, intubated  Resp: MV, CTAB  CV: Tachycardic, serosanguinous output from chest tubes  Abdomen: Soft, Non-distended, Non-tender  Incisions: c/d/i  Extremities: warm and well perfused    =========================================            "

## 2019-02-25 NOTE — PROGRESS NOTES
CV ICU PROGRESS NOTE  February 25, 2019      CO-MORBIDITIES:   Chronic systolic heart failure (H)  (primary encounter diagnosis)  Acute on chronic systolic congestive heart failure (H)  Acute on chronic systolic congestive heart failure (H)    ASSESSMENT: Everton Larios is a 53 year old male with PMH etoh abuse, hypothyroidism, intermittent asthma, ulcerative colitis, Depression, Chronic HFrEF, NICM admitted with cardiogenic shock requiring subclavian balloon pump now s/p OHT 2/24/19 with Piyush House and Christopher.     TODAY'S PROGRESS:   Decrease HR goal to 95, titrate isopreterenol  Wean epi as able  Extubated this morning  Swallow evaluation  Start senna for bowel regimen  Lasix 40 mg x 1  Cefazolin -> vancomycin and zosyn  Interposition graft for persistent left SVC - plan to start anticoagulation tomorrow    PLAN:  Neuro/ pain/ sedation:  - Monitor neurological status. Notify the MD for any acute changes in exam.  - Discontinue fentanyl gtt, start tylenol, oxycodone and dilaudid  - Weaned sedation off     #Depression  PTA cymbalta 20mg     Pulmonary care:   - MV-> extubated  - Supp oxygen to maintain SpO2 >92%    #Intermittent asthma  - PTA Montelukast ordered    Cardiovascular:  HR goal >95  - Monitor hemodynamic status.   - MAP >65. HR goal > 95.  - Epi, Isopreterenol gtt  - Likely to transition to terbutaline tomorrow     GI care:   - NPO except ice chips and medications.  - Swallow evaluation pending.    #Pierce's esophagus  #Ulcerative colitis  -PTA Mesalamine     Fluids/ Electrolytes/ Nutrition:   - TKO for IV fluid hydration  - No indication for parenteral nutrition.    Renal/ Fluid Balance:    - Urine output is adequate so far.  - Will continue to monitor intake and output.  - PTA Tamsulosin ordered     Endocrine:    # Stress hyperglycemia  - Insulin gtt     ID/ Antibiotics:  - Perioperative antibiotics.   - Switch cefazolin to vancomycin and zosyn x 3 days.     Heme:     - Hgb goal >7  - No active  concern for bleeding.     Prophylaxis:    - Mechanical prophylaxis for DVT.  - No chemical DVT prophylaxis due to high risk of bleeding. Will start anticoagulation tomorrow.  - Protonix qd     Lines/ tubes/ drains:  - MAC, [place], Arterial line, maddox     Disposition:  - [facility] ICU.     Patient seen, findings and plan discussed with [facility] ICU staff, Dr. Mederos.    Leyla Bowling MD  General Surgery PGY-3    ====================================    SUBJECTIVE:   Extubated  Intermittent confusion, reorients   Epi weaned off    OBJECTIVE:   1. VITAL SIGNS:   Temp:  [97.7  F (36.5  C)-99.1  F (37.3  C)] 98.6  F (37  C)  Pulse:  [99] 99  Heart Rate:  [] 102  Resp:  [18-23] 22  BP: (91)/(64) 91/64  MAP:  [57 mmHg-83 mmHg] 72 mmHg  Arterial Line BP: ()/(31-65) 113/57  FiO2 (%):  [40 %-100 %] 40 %  SpO2:  [94 %-100 %] 97 %  Ventilation Mode: CMV/AC  (Continuous Mandatory Ventilation/ Assist Control)  FiO2 (%): 40 %  Rate Set (breaths/minute): 22 breaths/min  Tidal Volume Set (mL): 450 mL  PEEP (cm H2O): 5 cmH2O  Oxygen Concentration (%): 40 %  Resp: 22      2. INTAKE/ OUTPUT:   I/O last 3 completed shifts:  In: 6177.87 [I.V.:1936.87; Other:838; NG/GT:60]  Out: 3184 [Urine:1590; Blood:1000; Chest Tube:594]    3. PHYSICAL EXAMINATION:     General: resting in bed  Neuro: extubated  Resp: MV, CTAB  CV: Tachycardic, serosanguinous output from chest tubes  Abdomen: Soft, Non-distended, Non-tender  Incisions: c/d/i  Extremities: warm and well perfused    4. INVESTIGATIONS:   Arterial Blood Gases   Recent Labs   Lab 02/25/19  0350 02/25/19  0110 02/24/19  2140 02/24/19  1817   PH 7.39 7.35 7.32* 7.31*   PCO2 42 42 46* 50*   PO2 87 170* 188* 232*   HCO3 25 23 24 25     Complete Blood Count   Recent Labs   Lab 02/25/19  0407 02/24/19  2140 02/24/19  1817 02/24/19  1554 02/24/19  1459  02/24/19  0340 02/23/19  1835   WBC 13.4*  --  19.9*  --   --   --  7.2 6.9   HGB 11.1* 12.5* 12.0* 10.7*  --    < > 14.9 14.2   *  --  158   --  92*  --  132* 122*    < > = values in this interval not displayed.     Basic Metabolic Panel  Recent Labs   Lab 02/25/19  0407 02/24/19  2140 02/24/19  1817 02/24/19  1554 02/24/19  1402  02/24/19  0340  02/23/19  1835     --  144 141 139   < > 135  --  133   POTASSIUM 3.2* 4.0 3.7 3.0* 3.5   < > 3.3*   < > 3.6   CHLORIDE 108  --  106  --   --   --  95  --  93*   CO2 23  --  23  --   --   --  27  --  30   BUN 35*  --  27  --   --   --  25  --  28   CR 1.25  --  1.06  --   --   --  0.90  --  1.03   *  --  130* 219* 263*   < > 109*  --  114*    < > = values in this interval not displayed.     Liver Function Tests  Recent Labs   Lab 02/24/19  1817 02/24/19  1556 02/24/19  0340 02/23/19  1835   AST 92*  --   --  21   ALT 24  --   --  15   ALKPHOS 96  --   --  169*   BILITOTAL 2.7*  --   --  1.6*   ALBUMIN 2.4*  --   --  3.4   INR 1.50* 1.57* 1.19* 1.20*     Pancreatic Enzymes  Recent Labs   Lab 02/23/19  1835   AMYLASE 22*     Coagulation Profile  Recent Labs   Lab 02/24/19  1817 02/24/19  1556 02/24/19  0340 02/23/19  1835   INR 1.50* 1.57* 1.19* 1.20*   PTT 55* 38*  --  41*         5. RADIOLOGY:   Recent Results (from the past 24 hour(s))   XR Chest Port 1 View   Result Value    Radiologist flags Right PICC line coiled in the SVC.,, (Urgent)    Narrative    Exam: XR CHEST PORT 1 VW, 2/24/2019 6:38 PM    Indication: post op    Additional information from the health record: Patient status post  cardiac transplant today.    Comparison: Same day abdominal radiograph, chest radiograph dated  2/24/2018.    Findings:   Single AP view of the chest. The right costophrenic angle is partially  collimated out of view. Endotracheal tube in place with the distal end  projecting in mid thoracic trachea. Right IJ approach [place]-Liz  catheter with the tip projecting in the main pulmonary outflow tract,  retracted. Inferior approach right-sided chest tube in place. Distal  end is not seen on this exam. On same-day  abdominal radiograph the  tube is seen in the right costophrenic angle. Inferior approach chest  tube with the distal end projecting in the left costophrenic angle.  Additional mediastinal drains in place. The first with the tip  projecting near the aortic arch. The second with the tip projecting at  the level of the tracheal bifurcation. Enteric tube in place the  distal end extending beyond the field-of-view. Right approach PICC  line. The tip coiled in the SVC. The proximal aspect appears coiled  over the axilla. Perineural anesthesia catheters. Temporary pacing  lead projecting over the inferior aspect of the cardiac silhouette.  Intact median sternotomy wires. Surgical clips projecting over the  left axilla.    Patchy airspace opacities most prominent in the upper lungs and  perihilar regions. No large pleural effusion or pneumothorax.  Unchanged enlargement of the cardiac silhouette Visualized portion of  the upper abdomen is unremarkable. No acute osseous findings.      Impression    Impression:   1. Extensive postoperative changes of the chest.  2. Right IJ approach PICC line with the tip coiled in the mid thoracic  SVC. The proximal aspect appears coiled over the axilla.  3. Intra-aortic balloon pump in place with the marker projecting at  the level of the aortic arch. Consider retraction.   4. Additional support devices as detailed above.  5. Patchy upper lung and perihilar predominant airspace opacities  which may represent pulmonary edema or atelectasis.  6. No large pneumothorax or pleural effusion.,    ,  [Urgent Result: Right PICC line coiled in the SVC.],,    Finding was identified on 2/24/2019 7:00 PM.     Dewayne Mckeon MD was contacted by Dr. Danny Fleming DO (Radiology  R2) at 2/24/2019 7:07 PM and verbalized understanding of the urgent  finding.     I have personally reviewed the examination and initial interpretation  and I agree with the findings.    SOCORRO ORR MD   XR Abdomen Port 1  View    Narrative    Exam: XR ABDOMEN PORT 1 VW, 2/24/2019 6:38 PM    Indication: OG Placement    Comparison: Same-day chest radiograph, fluoroscopic images dated  2/21/2018.    Findings:   Single supine AP view of the abdomen. Enteric tube in place the distal  end and sidehole projecting at the expected level of the stomach.  Bilateral chest tubes in place. The distal ends projecting at the  right and left costophrenic angles. Additional mediastinal drains in  place the distal end extending beyond the field-of-view. Partially  visualized [place]-Liz catheter, the distal end extending beyond the  field-of-view. Temporary pacer lead projecting over the cardiac  silhouette. Neural anesthesia catheters.    Patchy airspace opacities most prominent in the upper lungs are better  visualized on same-day chest radiograph. The costophrenic angles are  clear. Visualized portion of the abdomen is nearly gasless. No acute  osseous findings.      Impression    Impression:   1. Enteric tube in place with the distal end and sidehole projecting  over the stomach.  2. Additional lines and support devices as detailed above.  3. Patchy airspace opacities most prominent in the upper lungs are  better seen on same-day chest radiograph. Please see that report for  full details.  4. Nonobstructive visualized bowel gas pattern.    I have personally reviewed the examination and initial interpretation  and I agree with the findings.    SOCORRO ORR MD   XR Chest Port 1 View    Narrative    Exam: XR CHEST PORT 1 , 2/25/2019 1:10 AM    Indication: confirm PA catheter position    Comparison: Chest radiograph dated 2/24/2019.    Findings:   Single AP view of the chest. Portion of the right costophrenic angle  collimated out of view. Right IJ approach [place]-Liz catheter with the  tip projecting over the right main pulmonary artery. Bilateral basilar  chest tubes and mediastinal drains are stable in position. The distal  end of the right chest  tube not visualized on this exam. Endotracheal  tube in place with the tip projecting in the mid thoracic trachea.  Enteric tube in place with the distal end extending beyond the  field-of-view. Postsurgical changes of heart transplant with intact  median sternotomy wires.     Patchy airspace opacities most prominent in the upper lungs are  slightly improved as compared to prior exam. New areas of linear  atelectasis in the right upper lung. Stable areas of linear  atelectasis in the left midlung. No large pleural effusion or  pneumothorax. Stable enlargement of the cardiac silhouette.  ,    Impression    Impression:   1. [place]-Liz catheter in place with the tip projecting at the level of  the right main pulmonary artery. Additional lines and support devices  as detailed above.  2. Patchy airspace opacities most prominent in the upper lungs are  slightly improved.    I have personally reviewed the examination and initial interpretation  and I agree with the findings.    ZAC MIRZA MD       =========================================

## 2019-02-25 NOTE — ANESTHESIA POSTPROCEDURE EVALUATION
Anesthesia POST Procedure Evaluation    Patient: Everton Larios   MRN:     [id number] Gender:   male   Age:    53 year old :      1965        Preoperative Diagnosis: Non-Ischemic cardiomyopathy   Procedure(s):  TRANSPLANT HEART RECIPIENT   Postop Comments: No value filed.       Anesthesia Type:  General    Reportable Event: NO     PAIN:        Airway/Resp.: Uneventful perioperative course   Sign Out Status: Airway Device presnt     Airway Device: ETT                 Reason: Planned (pre-op)     CV: Uneventful perioperative course   Sign Out status: CV Support within EXPECTED Parameters     Disposition:   Sign Out in:  ICU  Disposition:  ICU  Recovery Course: Recovery in ICU  Follow-Up: Not required     Comments/Narrative:  Patient intubated and sedated; unable to assess pain, PONV, mental status.  Transported to ICU on full monitors and controlled ventilation.  Vital signs at time of transfer:    BP 92/47  SpO2 199%    Supported hemodynamically with 0.05 mcg/kg/min epi, 0.01 mcg/kg/min isopreterenol, 1 unit(s)/h vasopressin.  Sedated with propofol 30 mcg/kg/min.  Insulin running at 5 u/h.  Potassium being replaced.    Full report to ICU team.             Last Anesthesia Record Vitals:  CRNA VITALS  2019 1721 - 2019 1812      2019             Resp Rate (set):  10          Last PACU/Preop Vitals:  Vitals:    19 0600 19 0738 19 0800   BP: 92/73  (!) 79/66   Pulse:      Resp:      Temp:  36.9  C (98.5  F)    SpO2: 98%  99%         Electronically Signed By: Nazia Camejo MD, 2019, 6:12 PM

## 2019-02-25 NOTE — PROGRESS NOTES
[facility], [place]  Procedure Note          Extubation:       Everton Larios  MRN# [id number]   February 25, 2019, 10:32 AM         Patient extubated at: February 25, 2019, 10:32 AM   Supplemental Oxygen: Via nasal cannula at 4 liters per minute   Cough: The cough is good   Secretion Mode: PRN suction with assistance   Secretion Amount: Moderate amount, thick and yellow in color   Respiratory Exam:: Breath sounds: clear     Location: all lobes   Skin Exam:: Patient color: natural   Patient Status: Currently appears comfortable   Arterial Blood Gasses: pH Arterial (pH)   Date Value   02/25/2019 7.33 (L)     pO2 Arterial (mm Hg)   Date Value   02/25/2019 125 (H)     pCO2 Arterial (mm Hg)   Date Value   02/25/2019 46 (H)     Bicarbonate Arterial (mmol/L)   Date Value   02/25/2019 24            Recorded by Rivka Pool RRT

## 2019-02-25 NOTE — PLAN OF CARE
A: Extubated this AM, placed on 2-4 L Nc, ABG's show resolving respiratory acidosis. Continue to push cough and deep breathe and IS. Oriented X4, with intermittent confusion, able to reorient. Dilaudid given X3 for pain control. Remains in a NSR to ST HR  bpm Isoprel titrated down to 0.03, for HR goal > 95. Epi infusion off, MAP maintained > 65. 20 mg IV Lasix given for Cvp 14. CVP 10-14, PAP 30-32/18, PAWP 9, SVR 5030-3035. Right pleural chest tube dressing saturated, changed, decrease in bleeding at site. Pacing wires on standby. Failed bedside swallow X2, team updated. Family at bedside and updated on plan of care.     P: Awake and alert, pain control, increase activity, swallow eval.     Sharmaine Saab, RN on 2/25/2019 at 6:06 PM

## 2019-02-25 NOTE — PLAN OF CARE
OT/CR 4E: Re-evaluation complete and treatment initiated.  Discharge Planner OT   Patient plan for discharge: not discussed  Current status: Pt is now s/p heart transplant.  Recently extubated to 4L and tolerating well.  Provided education on post surgical precautions and implications for mobility and self cares though pt intermittently drowsy and will benefit from reinforcement.  Pt Mod A x 1-2 supine to EOB, sit to stand and transfer to bedside chair.  Demonstrates posterior lean.  Somewhat orthostatic with activity with MAP 59 in standing improved to upper 60s with seated rest.   Barriers to return to prior living situation: acute medical needs, pain, post surgical precautions, impaired balance, weakness, decreased activity tolerance  Recommendations for discharge: TCU  Rationale for recommendations: Pt is currently below baseline with regards to mobility and independence with self cares and will benefit from continued skilled therapy intervention to address deficits.         Entered by: Polina Horton 02/25/2019 2:34 PM

## 2019-02-25 NOTE — PROGRESS NOTES
Donor Culture Results:    Preliminary / Final positive donor blood/urine/sputum culture results have been uploaded into [facility]. Donor ID [id number].  Dr. Donis notified of results.

## 2019-02-25 NOTE — ANESTHESIA CARE TRANSFER NOTE
Patient: Everton Larios    Procedure(s):  TRANSPLANT HEART RECIPIENT    Diagnosis: Non-Ischemic cardiomyopathy  Diagnosis Additional Information: No value filed.    Anesthesia Type:   No value filed.     Note:  Airway :ETT  Patient transferred to:ICU  ICU Handoff: Call for PAUSE to initiate/utilize ICU HANDOFF, Identified Patient, Identified Responsible Provider, Reviewed the Pertinent Medical History, Discussed Surgical Course, Reviewed Intra-OP Anesthesia Management and Issues during Anesthesia, Set Expectations for Post Procedure Period and Allowed Opportunity for Questions and Acknowledgement of Understanding      Vitals: (Last set prior to Anesthesia Care Transfer)    CRNA VITALS  2/24/2019 1721 - 2/24/2019 1814      2/24/2019             Resp Rate (set):  10                Electronically Signed By: TAI Barriga CRNA  February 24, 2019  6:14 PM

## 2019-02-25 NOTE — PROGRESS NOTES
ADVANCED HEART FAILURE PROGRESS NOTE    SUBJECTIVE:  Underwent successful heart transplant yesterday evening. Overnight titrated up on epi and isuprel primarily to maintain HR > 100. Otherwise stable. Sedated but following commands this morning.     ROS otherwise negative.    OBJECTIVE:  Vital signs:  Temp: 97.7  F (36.5  C) Temp  Min: 97.7  F (36.5  C)  Max: 99.1  F (37.3  C)  Heart Rate: 101 Heart Rate  Min: 80  Max: 114  BP: 91/64(Simultaneous filing. User may not have seen previous data.) Systolic (24hrs), Av , Min:79 , Max:91   Diastolic (24hrs), Av, Min:64, Max:66    Resp: (P) 22 Resp  Min: 18  Max: 23  SpO2: 98 % SpO2  Min: 94 %  Max: 100 %      Holden: RA 8, PA 28/13, PCW 9, PA sat 68%, CO/CI 4.5/2.8      Intake/Output Summary (Last 24 hours) at 2019 0655  Last data filed at 2019 0600  Gross per 24 hour   Intake 6177.87 ml   Output 3184 ml   Net 2993.87 ml       Vitals:    19 0400 19 0400 19 0600   Weight: 57.4 kg (126 lb 9.6 oz) 54.7 kg (120 lb 8 oz) 58 kg (127 lb 13.9 oz)       Physical Exam:  Gen: intubated, sedated  HEENT: PERRL, ETT in place  Resp: bilateral ventilated breath sounds  CVS: tachycardic, regular, no murmurs  Abdo: soft, nontender, nondistended  Extremities: warm, no edema, normal pulses  Neuro: sedated, following commands and responding appropriately       Medications:    IV fluid REPLACEMENT ONLY       EPINEPHrine IV infusion ADULT 0.06 mcg/kg/min (19 1100)     fentaNYL Stopped (19 1037)     insulin (regular) 3 Units/hr (19 1054)     isoproterenol (ISUPREL) infusion ADULT 0.05 mcg/kg/min (19 1100)     - MEDICATION INSTRUCTIONS -       Reason anticoagulation order not selected       norepinephrine Stopped (19 1602)     - MEDICATION INSTRUCTIONS -       propofol (DIPRIVAN) infusion Stopped (19 0844)     BETA BLOCKER NOT PRESCRIBED         Current Facility-Administered Medications   Medication Dose Route Frequency      DULoxetine  20 mg Oral Daily     fluticasone  2 puff Inhalation Q12H     heparin lock flush  5 mL Intravenous Q24H     levothyroxine  100 mcg Oral Daily     lidocaine  1 patch Transdermal Q24H     lidocaine   Transdermal Q8H     lidocaine   Transdermal Q24h     mesalamine  2,400 mg Oral BID     montelukast  10 mg Oral At Bedtime     mycophenolate mofetil  1,500 mg Intravenous BID IS     omeprazole  40 mg Oral or Feeding Tube QPM     piperacillin-tazobactam  3.375 g Intravenous Q6H     potassium chloride  40 mEq Oral Daily     sennosides  5 mL Oral or Feeding Tube BID     sodium chloride (PF)  3 mL Intracatheter Q8H     [START ON 2/26/2019] vancomycin (VANCOCIN) IV  1,000 mg Intravenous Q24H         Labs:  CMP  Recent Labs   Lab 02/25/19  0407 02/24/19 2140 02/24/19 1817 02/23/19  1835     --  144   < > 133   POTASSIUM 3.2* 4.0 3.7   < > 3.6   CHLORIDE 108  --  106   < > 93*   CO2 23  --  23   < > 30   BUN 35*  --  27   < > 28   CR 1.25  --  1.06   < > 1.03   RADAMES 8.4*  --  8.4*   < > 8.9   MAG 2.7*  --  3.0*   < > 2.0   PHOS  --   --  2.7  --  3.6   *  --  130*   < > 114*   LDH  --   --  536*  --   --    ALKPHOS  --   --  96  --  169*   BILITOTAL  --   --  2.7*  --  1.6*   AST  --   --  92*  --  21   ALT  --   --  24  --  15    < > = values in this interval not displayed.     BLOOD GAS  Recent Labs   Lab 02/25/19  0350 02/25/19  0110   PH 7.39 7.35   PCO2 42 42   PO2 87 170*     CBC  Recent Labs   Lab 02/25/19  0407 02/24/19 2140 02/24/19 1817   WBC 13.4*  --  19.9*   HGB 11.1* 12.5* 12.0*   HCT 34.2*  --  36.7*   *  --  158     COAG  Recent Labs   Lab 02/24/19 1817 02/24/19  1556   INR 1.50* 1.57*   PTT 55* 38*         ASSESSMENT/PLAN:  Everton Larios is a 54 yo gentleman with a h/o NICM, HFrEF (EF 15%) s/p ICD placement (2008, generator change 2018), congenital ASD repair in childhood, paroxysmal atrial fibrillation s/p ablation and cardioversion, hypothyroidism, EtOH abuse, ulcerative  colitis, remote history of GI bleed with splenic embolization, and a recent admission in 11/2018 for ambulatory cardiogenic shock requiring initiation of IV inotropes, who was admitted for heart transplant, now status 2 with a L subclavian balloon pump.    #S/p OHT on 2/24:  Graft function:  - BP good  - on epi 0.08 and isuprel 0.08 to maintain HR > 100, will relax goal to > 95  - post op antibiotic coverage with vanc/zosyn  - plan extubation today    Immunosuppression:  - on MMF 1500 BID  - methylpred 125 x 3 doses, then prednisone taper   - NOT Simulect protocol, starting tacrolimus 0.5 BID,     Prophylaxis:  - CMV +/+: medium risk, holding Valcyte for now  - PCP: holding Bactrim for now  - Thrush: start nystatin  - GI: on PPI  - CAV: holding ASA and statin for now     Goretex conduit for persistent left SVC:  - will need anticoagulation, warfarin vs heparin gtt      # Hypothyroidism: continue PTA levothyroxine  # Ulcerative colitis: currently in remission, continue PTA mesalamine  # GERD: continue PTA omeprazole  # BPH: continue PTA tamsulosin  # Depression: continue PTA duloxetine  # H/o EtOH abuse: currently denies any EtOH since 4 months ago      Seen and staffed with Dr. Jose Alfredo Young.    Trev Harris MD  Advanced Heart Failure Fellow  [phone]

## 2019-02-26 ENCOUNTER — APPOINTMENT (OUTPATIENT)
Dept: SPEECH THERAPY | Facility: CLINIC | Age: 54
DRG: 001 | End: 2019-02-26
Attending: NURSE PRACTITIONER
Payer: COMMERCIAL

## 2019-02-26 ENCOUNTER — APPOINTMENT (OUTPATIENT)
Dept: PHYSICAL THERAPY | Facility: CLINIC | Age: 54
DRG: 001 | End: 2019-02-26
Attending: PHYSICIAN ASSISTANT
Payer: COMMERCIAL

## 2019-02-26 ENCOUNTER — APPOINTMENT (OUTPATIENT)
Dept: OCCUPATIONAL THERAPY | Facility: CLINIC | Age: 54
DRG: 001 | End: 2019-02-26
Attending: INTERNAL MEDICINE
Payer: COMMERCIAL

## 2019-02-26 ENCOUNTER — APPOINTMENT (OUTPATIENT)
Dept: GENERAL RADIOLOGY | Facility: CLINIC | Age: 54
DRG: 001 | End: 2019-02-26
Attending: PHYSICIAN ASSISTANT
Payer: COMMERCIAL

## 2019-02-26 ENCOUNTER — APPOINTMENT (OUTPATIENT)
Dept: CT IMAGING | Facility: CLINIC | Age: 54
DRG: 001 | End: 2019-02-26
Attending: THORACIC SURGERY (CARDIOTHORACIC VASCULAR SURGERY)
Payer: COMMERCIAL

## 2019-02-26 ENCOUNTER — DOCUMENTATION ONLY (OUTPATIENT)
Dept: PHARMACY | Facility: CLINIC | Age: 54
End: 2019-02-26

## 2019-02-26 ENCOUNTER — APPOINTMENT (OUTPATIENT)
Dept: GENERAL RADIOLOGY | Facility: CLINIC | Age: 54
DRG: 001 | End: 2019-02-26
Attending: STUDENT IN AN ORGANIZED HEALTH CARE EDUCATION/TRAINING PROGRAM
Payer: COMMERCIAL

## 2019-02-26 LAB
ANION GAP SERPL CALCULATED.3IONS-SCNC: 11 MMOL/L (ref 3–14)
ANION GAP SERPL CALCULATED.3IONS-SCNC: 12 MMOL/L (ref 3–14)
ANION GAP SERPL CALCULATED.3IONS-SCNC: 12 MMOL/L (ref 3–14)
ANION GAP SERPL CALCULATED.3IONS-SCNC: 13 MMOL/L (ref 3–14)
BASE DEFICIT BLDV-SCNC: 0.1 MMOL/L
BASE DEFICIT BLDV-SCNC: 0.5 MMOL/L
BASE DEFICIT BLDV-SCNC: 4.3 MMOL/L
BASE EXCESS BLDV CALC-SCNC: 0.1 MMOL/L
BASE EXCESS BLDV CALC-SCNC: 0.3 MMOL/L
BUN SERPL-MCNC: 45 MG/DL (ref 7–30)
BUN SERPL-MCNC: 50 MG/DL (ref 7–30)
BUN SERPL-MCNC: 56 MG/DL (ref 7–30)
BUN SERPL-MCNC: 58 MG/DL (ref 7–30)
CALCIUM SERPL-MCNC: 8.3 MG/DL (ref 8.5–10.1)
CALCIUM SERPL-MCNC: 8.3 MG/DL (ref 8.5–10.1)
CALCIUM SERPL-MCNC: 8.6 MG/DL (ref 8.5–10.1)
CALCIUM SERPL-MCNC: 8.8 MG/DL (ref 8.5–10.1)
CHLORIDE SERPL-SCNC: 104 MMOL/L (ref 94–109)
CHLORIDE SERPL-SCNC: 106 MMOL/L (ref 94–109)
CHLORIDE SERPL-SCNC: 107 MMOL/L (ref 94–109)
CHLORIDE SERPL-SCNC: 110 MMOL/L (ref 94–109)
CO2 SERPL-SCNC: 22 MMOL/L (ref 20–32)
CO2 SERPL-SCNC: 23 MMOL/L (ref 20–32)
CO2 SERPL-SCNC: 25 MMOL/L (ref 20–32)
CO2 SERPL-SCNC: 26 MMOL/L (ref 20–32)
CREAT SERPL-MCNC: 1.56 MG/DL (ref 0.66–1.25)
CREAT SERPL-MCNC: 1.58 MG/DL (ref 0.66–1.25)
ERYTHROCYTE [DISTWIDTH] IN BLOOD BY AUTOMATED COUNT: 14.3 % (ref 10–15)
ERYTHROCYTE [DISTWIDTH] IN BLOOD BY AUTOMATED COUNT: 14.4 % (ref 10–15)
GFR SERPL CREATININE-BSD FRML MDRD: 48 ML/MIN/{1.73_M2}
GFR SERPL CREATININE-BSD FRML MDRD: 49 ML/MIN/{1.73_M2}
GLUCOSE BLDC GLUCOMTR-MCNC: 102 MG/DL (ref 70–99)
GLUCOSE BLDC GLUCOMTR-MCNC: 115 MG/DL (ref 70–99)
GLUCOSE BLDC GLUCOMTR-MCNC: 116 MG/DL (ref 70–99)
GLUCOSE BLDC GLUCOMTR-MCNC: 122 MG/DL (ref 70–99)
GLUCOSE BLDC GLUCOMTR-MCNC: 127 MG/DL (ref 70–99)
GLUCOSE BLDC GLUCOMTR-MCNC: 139 MG/DL (ref 70–99)
GLUCOSE BLDC GLUCOMTR-MCNC: 168 MG/DL (ref 70–99)
GLUCOSE BLDC GLUCOMTR-MCNC: 177 MG/DL (ref 70–99)
GLUCOSE BLDC GLUCOMTR-MCNC: 192 MG/DL (ref 70–99)
GLUCOSE SERPL-MCNC: 118 MG/DL (ref 70–99)
GLUCOSE SERPL-MCNC: 148 MG/DL (ref 70–99)
GLUCOSE SERPL-MCNC: 198 MG/DL (ref 70–99)
GLUCOSE SERPL-MCNC: 205 MG/DL (ref 70–99)
HCO3 BLDV-SCNC: 21 MMOL/L (ref 21–28)
HCO3 BLDV-SCNC: 26 MMOL/L (ref 21–28)
HCO3 BLDV-SCNC: 26 MMOL/L (ref 21–28)
HCO3 BLDV-SCNC: 27 MMOL/L (ref 21–28)
HCO3 BLDV-SCNC: 27 MMOL/L (ref 21–28)
HCT VFR BLD AUTO: 28.8 % (ref 40–53)
HCT VFR BLD AUTO: 32.2 % (ref 40–53)
HCT VFR BLD AUTO: 34.3 % (ref 40–53)
HCT VFR BLD AUTO: 34.5 % (ref 40–53)
HGB BLD-MCNC: 10.2 G/DL (ref 13.3–17.7)
HGB BLD-MCNC: 10.7 G/DL (ref 13.3–17.7)
HGB BLD-MCNC: 10.9 G/DL (ref 13.3–17.7)
HGB BLD-MCNC: 9.2 G/DL (ref 13.3–17.7)
HIV 1+2 AB+HIV1 P24 AG SERPL QL IA: NONREACTIVE
INTERPRETATION ECG - MUSE: NORMAL
LACTATE BLD-SCNC: 1.2 MMOL/L (ref 0.7–2)
MAGNESIUM SERPL-MCNC: 2.6 MG/DL (ref 1.6–2.3)
MAGNESIUM SERPL-MCNC: 2.7 MG/DL (ref 1.6–2.3)
MCH RBC QN AUTO: 29.9 PG (ref 26.5–33)
MCH RBC QN AUTO: 30 PG (ref 26.5–33)
MCH RBC QN AUTO: 30.3 PG (ref 26.5–33)
MCH RBC QN AUTO: 30.4 PG (ref 26.5–33)
MCHC RBC AUTO-ENTMCNC: 31.2 G/DL (ref 31.5–36.5)
MCHC RBC AUTO-ENTMCNC: 31.6 G/DL (ref 31.5–36.5)
MCHC RBC AUTO-ENTMCNC: 31.7 G/DL (ref 31.5–36.5)
MCHC RBC AUTO-ENTMCNC: 31.9 G/DL (ref 31.5–36.5)
MCV RBC AUTO: 95 FL (ref 78–100)
MCV RBC AUTO: 95 FL (ref 78–100)
MCV RBC AUTO: 96 FL (ref 78–100)
MCV RBC AUTO: 96 FL (ref 78–100)
O2/TOTAL GAS SETTING VFR VENT: ABNORMAL %
O2/TOTAL GAS SETTING VFR VENT: NORMAL %
OXYHGB MFR BLDV: 41 %
OXYHGB MFR BLDV: 50 %
OXYHGB MFR BLDV: 52 %
OXYHGB MFR BLDV: 56 %
OXYHGB MFR BLDV: 63 %
PCO2 BLDV: 37 MM HG (ref 40–50)
PCO2 BLDV: 48 MM HG (ref 40–50)
PCO2 BLDV: 51 MM HG (ref 40–50)
PCO2 BLDV: 52 MM HG (ref 40–50)
PCO2 BLDV: 53 MM HG (ref 40–50)
PH BLDV: 7.31 PH (ref 7.32–7.43)
PH BLDV: 7.32 PH (ref 7.32–7.43)
PH BLDV: 7.33 PH (ref 7.32–7.43)
PH BLDV: 7.35 PH (ref 7.32–7.43)
PH BLDV: 7.36 PH (ref 7.32–7.43)
PHOSPHATE SERPL-MCNC: 7.4 MG/DL (ref 2.5–4.5)
PLATELET # BLD AUTO: 138 10E9/L (ref 150–450)
PLATELET # BLD AUTO: 162 10E9/L (ref 150–450)
PLATELET # BLD AUTO: 163 10E9/L (ref 150–450)
PLATELET # BLD AUTO: 170 10E9/L (ref 150–450)
PO2 BLDV: 28 MM HG (ref 25–47)
PO2 BLDV: 31 MM HG (ref 25–47)
PO2 BLDV: 32 MM HG (ref 25–47)
PO2 BLDV: 34 MM HG (ref 25–47)
PO2 BLDV: 37 MM HG (ref 25–47)
POTASSIUM SERPL-SCNC: 3.9 MMOL/L (ref 3.4–5.3)
POTASSIUM SERPL-SCNC: 4.3 MMOL/L (ref 3.4–5.3)
POTASSIUM SERPL-SCNC: 4.4 MMOL/L (ref 3.4–5.3)
POTASSIUM SERPL-SCNC: 4.8 MMOL/L (ref 3.4–5.3)
RADIOLOGIST FLAGS: ABNORMAL
RBC # BLD AUTO: 3.03 10E12/L (ref 4.4–5.9)
RBC # BLD AUTO: 3.37 10E12/L (ref 4.4–5.9)
RBC # BLD AUTO: 3.58 10E12/L (ref 4.4–5.9)
RBC # BLD AUTO: 3.63 10E12/L (ref 4.4–5.9)
SODIUM SERPL-SCNC: 139 MMOL/L (ref 133–144)
SODIUM SERPL-SCNC: 144 MMOL/L (ref 133–144)
SODIUM SERPL-SCNC: 144 MMOL/L (ref 133–144)
SODIUM SERPL-SCNC: 145 MMOL/L (ref 133–144)
WBC # BLD AUTO: 17.8 10E9/L (ref 4–11)
WBC # BLD AUTO: 19.4 10E9/L (ref 4–11)
WBC # BLD AUTO: 20.7 10E9/L (ref 4–11)
WBC # BLD AUTO: 22.2 10E9/L (ref 4–11)

## 2019-02-26 PROCEDURE — 25800030 ZZH RX IP 258 OP 636: Performed by: STUDENT IN AN ORGANIZED HEALTH CARE EDUCATION/TRAINING PROGRAM

## 2019-02-26 PROCEDURE — 99233 SBSQ HOSP IP/OBS HIGH 50: CPT | Mod: GC | Performed by: INTERNAL MEDICINE

## 2019-02-26 PROCEDURE — 25000132 ZZH RX MED GY IP 250 OP 250 PS 637: Performed by: NURSE PRACTITIONER

## 2019-02-26 PROCEDURE — 25000128 H RX IP 250 OP 636: Performed by: NURSE ANESTHETIST, CERTIFIED REGISTERED

## 2019-02-26 PROCEDURE — 92526 ORAL FUNCTION THERAPY: CPT | Mod: GN

## 2019-02-26 PROCEDURE — 84100 ASSAY OF PHOSPHORUS: CPT | Performed by: PHYSICIAN ASSISTANT

## 2019-02-26 PROCEDURE — 83605 ASSAY OF LACTIC ACID: CPT | Performed by: PHYSICIAN ASSISTANT

## 2019-02-26 PROCEDURE — 25000132 ZZH RX MED GY IP 250 OP 250 PS 637: Performed by: STUDENT IN AN ORGANIZED HEALTH CARE EDUCATION/TRAINING PROGRAM

## 2019-02-26 PROCEDURE — 71045 X-RAY EXAM CHEST 1 VIEW: CPT

## 2019-02-26 PROCEDURE — 25000128 H RX IP 250 OP 636: Performed by: STUDENT IN AN ORGANIZED HEALTH CARE EDUCATION/TRAINING PROGRAM

## 2019-02-26 PROCEDURE — 85027 COMPLETE CBC AUTOMATED: CPT | Performed by: PHYSICIAN ASSISTANT

## 2019-02-26 PROCEDURE — 97530 THERAPEUTIC ACTIVITIES: CPT | Mod: GP

## 2019-02-26 PROCEDURE — 80048 BASIC METABOLIC PNL TOTAL CA: CPT | Performed by: PHYSICIAN ASSISTANT

## 2019-02-26 PROCEDURE — 25000125 ZZHC RX 250: Performed by: INTERNAL MEDICINE

## 2019-02-26 PROCEDURE — 74018 RADEX ABDOMEN 1 VIEW: CPT

## 2019-02-26 PROCEDURE — 97162 PT EVAL MOD COMPLEX 30 MIN: CPT | Mod: GP

## 2019-02-26 PROCEDURE — 92610 EVALUATE SWALLOWING FUNCTION: CPT | Mod: GN

## 2019-02-26 PROCEDURE — 25000128 H RX IP 250 OP 636: Performed by: SURGERY

## 2019-02-26 PROCEDURE — 25800030 ZZH RX IP 258 OP 636: Performed by: INTERNAL MEDICINE

## 2019-02-26 PROCEDURE — 83735 ASSAY OF MAGNESIUM: CPT | Performed by: PHYSICIAN ASSISTANT

## 2019-02-26 PROCEDURE — 25000131 ZZH RX MED GY IP 250 OP 636 PS 637: Performed by: STUDENT IN AN ORGANIZED HEALTH CARE EDUCATION/TRAINING PROGRAM

## 2019-02-26 PROCEDURE — 25800030 ZZH RX IP 258 OP 636: Performed by: NURSE ANESTHETIST, CERTIFIED REGISTERED

## 2019-02-26 PROCEDURE — 97535 SELF CARE MNGMENT TRAINING: CPT | Mod: GO | Performed by: OCCUPATIONAL THERAPIST

## 2019-02-26 PROCEDURE — C9113 INJ PANTOPRAZOLE SODIUM, VIA: HCPCS | Performed by: NURSE PRACTITIONER

## 2019-02-26 PROCEDURE — 00000146 ZZHCL STATISTIC GLUCOSE BY METER IP

## 2019-02-26 PROCEDURE — 85027 COMPLETE CBC AUTOMATED: CPT | Performed by: STUDENT IN AN ORGANIZED HEALTH CARE EDUCATION/TRAINING PROGRAM

## 2019-02-26 PROCEDURE — 40000014 ZZH STATISTIC ARTERIAL MONITORING DAILY

## 2019-02-26 PROCEDURE — 80048 BASIC METABOLIC PNL TOTAL CA: CPT | Performed by: STUDENT IN AN ORGANIZED HEALTH CARE EDUCATION/TRAINING PROGRAM

## 2019-02-26 PROCEDURE — 82805 BLOOD GASES W/O2 SATURATION: CPT | Performed by: PHYSICIAN ASSISTANT

## 2019-02-26 PROCEDURE — 97116 GAIT TRAINING THERAPY: CPT | Mod: GP

## 2019-02-26 PROCEDURE — 25000128 H RX IP 250 OP 636: Performed by: NURSE PRACTITIONER

## 2019-02-26 PROCEDURE — 40000196 ZZH STATISTIC RAPCV CVP MONITORING

## 2019-02-26 PROCEDURE — 40000048 ZZH STATISTIC DAILY SWAN MONITORING

## 2019-02-26 PROCEDURE — 25000132 ZZH RX MED GY IP 250 OP 250 PS 637: Performed by: ANESTHESIOLOGY

## 2019-02-26 PROCEDURE — 20000004 ZZH R&B ICU UMMC

## 2019-02-26 PROCEDURE — 25000128 H RX IP 250 OP 636: Performed by: INTERNAL MEDICINE

## 2019-02-26 PROCEDURE — 25000125 ZZHC RX 250: Performed by: STUDENT IN AN ORGANIZED HEALTH CARE EDUCATION/TRAINING PROGRAM

## 2019-02-26 PROCEDURE — 74176 CT ABD & PELVIS W/O CONTRAST: CPT

## 2019-02-26 PROCEDURE — 25000131 ZZH RX MED GY IP 250 OP 636 PS 637: Performed by: INTERNAL MEDICINE

## 2019-02-26 RX ORDER — AMOXICILLIN 250 MG
2 CAPSULE ORAL 2 TIMES DAILY
Status: DISCONTINUED | OUTPATIENT
Start: 2019-02-26 | End: 2019-02-26

## 2019-02-26 RX ORDER — FUROSEMIDE 10 MG/ML
40 INJECTION INTRAMUSCULAR; INTRAVENOUS ONCE
Status: COMPLETED | OUTPATIENT
Start: 2019-02-26 | End: 2019-02-26

## 2019-02-26 RX ORDER — AMOXICILLIN 250 MG
2 CAPSULE ORAL 2 TIMES DAILY
Status: DISCONTINUED | OUTPATIENT
Start: 2019-02-26 | End: 2019-04-03

## 2019-02-26 RX ORDER — FENTANYL CITRATE 50 UG/ML
0-50 INJECTION, SOLUTION INTRAMUSCULAR; INTRAVENOUS
Status: DISCONTINUED | OUTPATIENT
Start: 2019-02-26 | End: 2019-03-08

## 2019-02-26 RX ORDER — HEPARIN SODIUM 10000 [USP'U]/100ML
500 INJECTION, SOLUTION INTRAVENOUS CONTINUOUS
Status: DISCONTINUED | OUTPATIENT
Start: 2019-02-26 | End: 2019-02-27

## 2019-02-26 RX ORDER — OXYCODONE HYDROCHLORIDE 5 MG/1
5-10 TABLET ORAL EVERY 4 HOURS PRN
Status: DISCONTINUED | OUTPATIENT
Start: 2019-02-26 | End: 2019-03-10

## 2019-02-26 RX ORDER — HYDRALAZINE HYDROCHLORIDE 25 MG/1
25 TABLET, FILM COATED ORAL EVERY 8 HOURS SCHEDULED
Status: DISCONTINUED | OUTPATIENT
Start: 2019-02-26 | End: 2019-02-27

## 2019-02-26 RX ORDER — TACROLIMUS 0.5 MG/1
0.5 CAPSULE ORAL
Status: DISCONTINUED | OUTPATIENT
Start: 2019-02-26 | End: 2019-02-28

## 2019-02-26 RX ORDER — POLYETHYLENE GLYCOL 3350 17 G/17G
17 POWDER, FOR SOLUTION ORAL DAILY
Status: DISCONTINUED | OUTPATIENT
Start: 2019-02-26 | End: 2019-03-12

## 2019-02-26 RX ORDER — AMOXICILLIN 250 MG
1 CAPSULE ORAL 2 TIMES DAILY
Status: DISCONTINUED | OUTPATIENT
Start: 2019-02-26 | End: 2019-04-03

## 2019-02-26 RX ORDER — HEPARIN SODIUM 5000 [USP'U]/.5ML
5000 INJECTION, SOLUTION INTRAVENOUS; SUBCUTANEOUS EVERY 8 HOURS SCHEDULED
Status: DISCONTINUED | OUTPATIENT
Start: 2019-02-26 | End: 2019-02-26

## 2019-02-26 RX ORDER — BISACODYL 10 MG
10 SUPPOSITORY, RECTAL RECTAL DAILY
Status: DISCONTINUED | OUTPATIENT
Start: 2019-02-26 | End: 2019-03-04

## 2019-02-26 RX ORDER — FUROSEMIDE 10 MG/ML
20 INJECTION INTRAMUSCULAR; INTRAVENOUS ONCE
Status: COMPLETED | OUTPATIENT
Start: 2019-02-26 | End: 2019-02-26

## 2019-02-26 RX ADMIN — LEVOTHYROXINE SODIUM 100 MCG: 100 TABLET ORAL at 13:52

## 2019-02-26 RX ADMIN — SENNOSIDES AND DOCUSATE SODIUM 2 TABLET: 8.6; 5 TABLET ORAL at 21:09

## 2019-02-26 RX ADMIN — FENTANYL CITRATE 50 MCG: 50 INJECTION, SOLUTION INTRAMUSCULAR; INTRAVENOUS at 11:42

## 2019-02-26 RX ADMIN — METHYLPREDNISOLONE SODIUM SUCCINATE 24 MG: 40 INJECTION, POWDER, LYOPHILIZED, FOR SOLUTION INTRAMUSCULAR; INTRAVENOUS at 08:36

## 2019-02-26 RX ADMIN — SODIUM NITROPRUSSIDE 0.25 MCG/KG/MIN: 25 INJECTION INTRAVENOUS at 16:44

## 2019-02-26 RX ADMIN — MESALAMINE 2400 MG: 800 TABLET, DELAYED RELEASE ORAL at 13:53

## 2019-02-26 RX ADMIN — OXYCODONE HYDROCHLORIDE 5 MG: 5 TABLET ORAL at 14:30

## 2019-02-26 RX ADMIN — FENTANYL CITRATE 50 MCG: 50 INJECTION, SOLUTION INTRAMUSCULAR; INTRAVENOUS at 07:04

## 2019-02-26 RX ADMIN — POTASSIUM CHLORIDE 20 MEQ: 400 INJECTION, SOLUTION INTRAVENOUS at 12:53

## 2019-02-26 RX ADMIN — Medication 0.5 MG: at 01:51

## 2019-02-26 RX ADMIN — FENTANYL CITRATE 50 MCG: 50 INJECTION, SOLUTION INTRAMUSCULAR; INTRAVENOUS at 05:58

## 2019-02-26 RX ADMIN — Medication 0.5 MG: at 00:31

## 2019-02-26 RX ADMIN — HEPARIN SODIUM 500 UNITS/HR: 10000 INJECTION, SOLUTION INTRAVENOUS at 14:45

## 2019-02-26 RX ADMIN — LIDOCAINE 1 PATCH: 560 PATCH PERCUTANEOUS; TOPICAL; TRANSDERMAL at 11:41

## 2019-02-26 RX ADMIN — PIPERACILLIN AND TAZOBACTAM 3.38 G: 3; .375 INJECTION, POWDER, FOR SOLUTION INTRAVENOUS at 08:43

## 2019-02-26 RX ADMIN — PIPERACILLIN AND TAZOBACTAM 3.38 G: 3; .375 INJECTION, POWDER, FOR SOLUTION INTRAVENOUS at 14:56

## 2019-02-26 RX ADMIN — ISOPROTERENOL HYDROCHLORIDE 0.03 MCG/KG/MIN: 0.2 INJECTION, SOLUTION INTRAMUSCULAR; INTRAVENOUS at 05:01

## 2019-02-26 RX ADMIN — METHYLPREDNISOLONE SODIUM SUCCINATE 24 MG: 40 INJECTION, POWDER, LYOPHILIZED, FOR SOLUTION INTRAMUSCULAR; INTRAVENOUS at 17:52

## 2019-02-26 RX ADMIN — MYCOPHENOLATE MOFETIL 1500 MG: 500 INJECTION, POWDER, LYOPHILIZED, FOR SOLUTION INTRAVENOUS at 08:56

## 2019-02-26 RX ADMIN — EPINEPHRINE 0.02 MCG/KG/MIN: 1 INJECTION PARENTERAL at 22:33

## 2019-02-26 RX ADMIN — ONDANSETRON 4 MG: 4 TABLET, ORALLY DISINTEGRATING ORAL at 11:56

## 2019-02-26 RX ADMIN — FUROSEMIDE 40 MG: 10 INJECTION, SOLUTION INTRAVENOUS at 00:38

## 2019-02-26 RX ADMIN — FUROSEMIDE 40 MG: 10 INJECTION, SOLUTION INTRAVENOUS at 22:48

## 2019-02-26 RX ADMIN — MESALAMINE 2400 MG: 800 TABLET, DELAYED RELEASE ORAL at 21:11

## 2019-02-26 RX ADMIN — VANCOMYCIN HYDROCHLORIDE 1000 MG: 1 INJECTION, SOLUTION INTRAVENOUS at 08:31

## 2019-02-26 RX ADMIN — HYDRALAZINE HYDROCHLORIDE 25 MG: 25 TABLET ORAL at 14:31

## 2019-02-26 RX ADMIN — MONTELUKAST SODIUM 10 MG: 10 TABLET, COATED ORAL at 22:48

## 2019-02-26 RX ADMIN — PANTOPRAZOLE SODIUM 40 MG: 40 INJECTION, POWDER, FOR SOLUTION INTRAVENOUS at 11:42

## 2019-02-26 RX ADMIN — HYDRALAZINE HYDROCHLORIDE 25 MG: 25 TABLET ORAL at 22:48

## 2019-02-26 RX ADMIN — TACROLIMUS 0.5 MG: 0.5 CAPSULE ORAL at 17:52

## 2019-02-26 RX ADMIN — FLUTICASONE PROPIONATE 2 PUFF: 220 AEROSOL, METERED RESPIRATORY (INHALATION) at 11:45

## 2019-02-26 RX ADMIN — BISACODYL 10 MG: 10 SUPPOSITORY RECTAL at 14:11

## 2019-02-26 RX ADMIN — ACETAMINOPHEN 650 MG: 325 TABLET, FILM COATED ORAL at 17:52

## 2019-02-26 RX ADMIN — FLUTICASONE PROPIONATE 2 PUFF: 220 AEROSOL, METERED RESPIRATORY (INHALATION) at 21:11

## 2019-02-26 RX ADMIN — ISOPROTERENOL HYDROCHLORIDE 0.03 MCG/KG/MIN: 0.2 INJECTION, SOLUTION INTRAMUSCULAR; INTRAVENOUS at 15:46

## 2019-02-26 RX ADMIN — MYCOPHENOLATE MOFETIL 1500 MG: 500 INJECTION, POWDER, LYOPHILIZED, FOR SOLUTION INTRAVENOUS at 20:24

## 2019-02-26 RX ADMIN — DULOXETINE 20 MG: 20 CAPSULE, DELAYED RELEASE ORAL at 13:52

## 2019-02-26 RX ADMIN — OXYCODONE HYDROCHLORIDE 5 MG: 5 TABLET ORAL at 17:52

## 2019-02-26 RX ADMIN — FUROSEMIDE 20 MG: 10 INJECTION, SOLUTION INTRAVENOUS at 08:35

## 2019-02-26 RX ADMIN — ACETAMINOPHEN 650 MG: 325 TABLET, FILM COATED ORAL at 21:09

## 2019-02-26 RX ADMIN — PIPERACILLIN AND TAZOBACTAM 3.38 G: 3; .375 INJECTION, POWDER, FOR SOLUTION INTRAVENOUS at 21:09

## 2019-02-26 RX ADMIN — PIPERACILLIN AND TAZOBACTAM 3.38 G: 3; .375 INJECTION, POWDER, FOR SOLUTION INTRAVENOUS at 01:54

## 2019-02-26 RX ADMIN — POLYETHYLENE GLYCOL 3350 17 G: 17 POWDER, FOR SOLUTION ORAL at 16:01

## 2019-02-26 ASSESSMENT — PAIN DESCRIPTION - DESCRIPTORS
DESCRIPTORS: ACHING;CRAMPING;DISCOMFORT
DESCRIPTORS: ACHING;CONSTANT;DISCOMFORT
DESCRIPTORS: ACHING;CRAMPING;DISCOMFORT
DESCRIPTORS: ACHING
DESCRIPTORS: ACHING;CRAMPING

## 2019-02-26 ASSESSMENT — ACTIVITIES OF DAILY LIVING (ADL)
ADLS_ACUITY_SCORE: 12

## 2019-02-26 ASSESSMENT — MIFFLIN-ST. JEOR: SCORE: 1386.5

## 2019-02-26 NOTE — OP NOTE
Procedure Date: 02/24/2019      PREOPERATIVE DIAGNOSIS:  Nonischemic cardiomyopathy with cardiogenic shock, on intraaortic balloon pump support.      POSTOPERATIVE DIAGNOSIS:  Nonischemic cardiomyopathy with cardiogenic shock, on intraaortic balloon pump support.      PROCEDURES:   1.  Redo median sternotomy.   2.  Orthotopic heart transplantation.   3.  Bypass of persistent left superior vena cava to right atrial appendage with 14 mm [place]-Davis graft.   4.  Explant of automatic implantable cardioverter-defibrillator.    5.  Explant of intraaortic balloon pump from the left subclavian artery.      SURGEON:  Dewayne House MD       CO-SURGEON:  Ben White MD      NOTE:  A second attending surgeon was required for this operation because of complexity of the operation owing to the fact that the patient had a previous median sternotomy with persistent left superior vena cava.  Refer to the full details of Dr. House, who was the primary surgeon.      DESCRIPTION OF PROCEDURE:  The patient was brought to operating room in stable condition.  Following the administration of general anesthesia, the patient's chest, abdomen and lower extremities were prepped and draped in the usual sterile manner.  Redo median sternotomy was performed with the oscillating saw.  The adhesions were carefully dissected to expose the aorta, superior and inferior vena cava.  Cardiopulmonary bypass was initiated.  The rest of the heart was fully dissected out.  The aortic pressure was temporarily reduced and the aorta was cross-clamped.  The heart was explanted, leaving behind cuffs of left atrium, inferior vena cava, superior vena cava, aorta, pulmonary artery.  The left superior vena cava was dissected and amputated prior to its entry into the coronary sinus.  The donor heart was identified and found to have no abnormalities.  The heart transplantation was performed in the usual sterile fashion with the left atrial anastomosis followed by the  inferior vena caval pulmonary artery aortic and the superior vena caval anastomosis.  The cross-clamp was released.  The heart was defibrillated twice.  Rewarming reperfusion allowed.  During repeat of rewarming and reperfusion, a 40 mm [place]-Davis graft was sewn in end-to-end from the left superior vena cava to the right atrial appendage using continuous 4-0 Prolene suture.  De-airing maneuvers were performed.  The patient was gradually weaned off cardiopulmonary bypass.  Biventricular function within normal limits.  Protamine given.  Careful hemostasis obtained.  Platelets were also administered to assist hemostasis.  The chest was closed in the usual sterile fashion after appropriate chest tubes were placed.  Right infraclavicular incision was made and the AICD was explanted.  A left infraclavicular incision was made and the balloon pump was removed.  The patient was transferred to ICU in stable condition.        NOTE:  As cosurgeon, I performed specific parts of the operation, including the performance of some of the anastomosis, removal of intraaortic balloon pump and recipient cardiectomy.         EVELIN OLEA MD             D: 2019   T: 2019   MT: EDVIN      Name:     ANA ROSA BALDWIN   MRN:      [id number]        Account:        [id number]   :      1965           Procedure Date: 2019      Document: [id number]       cc: [facility] Surgery Billing

## 2019-02-26 NOTE — PROGRESS NOTES
Transplant Social Work Services Progress Note      Date of Initial Social Work Evaluation: 11/2018 and again 1/30/2019. Admission note complete 2/19/19  Collaborated with: Patient    Data: Everton received a heart transplant over the weekend. Remains on 4E, but extubated.   Intervention: Met briefly with Everton. Offered support  Assessment: Everton expressed needing to use the restroom and overall body pain. Sought out nurse for assistance and told Everton this writer would check in on him later  Education provided by SW: None  Plan:    Discharge Plans in Progress: TBD    Barriers to d/c plan: None    Follow up Plan: SW will continue to follow

## 2019-02-26 NOTE — PROGRESS NOTES
Prior Authorization Approval    tacrolimus 1mg caps  Date Initiated: 2/26/2019  Date Completed: 2/26/2019  Prior Auth Type: B vs D                Status: Approved    Effective Date: 01/27/2019 - 12/31/2099  Copay:       [place] Filled: Yes    Insurance: NELLIE - Phone [phone] Fax [phone]  ID: [id number]  Case Number: [id number]  Submitted Via: Angel Pillai  [facility] Pharmacy Liaison  Ph: [phone] Page: [phone]

## 2019-02-26 NOTE — PROGRESS NOTES
02/26/19 1153   General Information   Onset Date 02/17/19   Start of Care Date 02/26/19   Referring Physician Rita Berg, APRN CNP   Patient Profile Review/OT: Additional Occupational Profile Info See Profile for full history and prior level of function   Patient/Family Goals Statement Pt did not state   Swallowing Evaluation Bedside swallow evaluation   Behaviorial Observations Confused   Mode of current nutrition NPO   Respiratory Status O2 Supply   Type of O2 supply Nasal cannula   Comments Everton Larios is a 53 year old male with PMH etoh abuse, hypothyroidism, intermittent asthma, ulcerative colitis, Depression, Chronic HFrEF, NICM admitted with cardiogenic shock requiring subclavian balloon pump now s/p OHT 2/24/19 with Piyush House and Christopher. Per RN, pt failed nursing swallow screen d/t coughing. Clinical swallow eval completed per MD orders to further assess oropharyngeal swallow function.    Clinical Swallow Evaluation   Oral Musculature generally intact   Structural Abnormalities none present   Dentition present and adequate   Mucosal Quality dry   Mandibular Strength and Mobility intact   Oral Labial Strength and Mobility WFL   Lingual Strength and Mobility WFL   Velar Elevation intact   Buccal Strength and Mobility intact   Laryngeal Function Cough;Throat clear;Swallow;Voicing initiated   Additional Documentation Yes   Clinical Swallow Eval: Thin Liquid Texture Trial   Mode of Presentation, Thin Liquids cup;spoon;self-fed;fed by clinician   Volume of Liquid or Food Presented 5 oz   Oral Phase of Swallow Premature pharyngeal entry   Pharyngeal Phase of Swallow impaired;coughing/choking   Diagnostic Statement thin liquids via cup resulted in overt s/sx of aspiration marked by cough x1 on large sip. No other overt s/sx of aspiration with cues/assistance to take small single sips   Clinical Swallow Eval: Nectar Thick Liquid Texture Trial   Mode of Presentation, Nectar cup;self-fed   Volume of  Nectar Presented 2 oz   Oral Phase, [place] WFL   Pharyngeal Phase, [place] intact   Diagnostic Statement Pt tolerated nectar thick liquids via cup with no overt s/sx of aspiration    Clinical Swallow Eval: Puree Solid Texture Trial   Mode of Presentation, Puree spoon;fed by clinician   Volume of Puree Presented 3 tbsp   Oral Phase, Puree WFL   Pharyngeal Phase, Puree intact   Diagnostic Statement Pt tolerated pureed textures via spoon with no overt s/sx of aspiration   Clinical Swallow Eval: Semisolid Texture Trial   Mode of Presentation, Semisolid spoon;fed by clinician   Volume of Semisolid Food Presented 1 tbsp   Oral Phase, Semisolid other (see comments);Residue in oral cavity  (prolonged time for mastication)   Oral Residue, Semisolid mid posterior tongue   Pharyngeal Phase, Semisolid intact   Diagnostic Statement No overt s/sx of aspiration, however pt required prolonged time for mastication which resulted in mild oral residuals. Pt was able to clear with cues to use liquid wash   VFSS Evaluation   VFSS Additional Documentation No   FEES Evaluation   Additional Documentation No   Swallow Compensations   Swallow Compensations Alternate viscosity of consistencies;Effortful swallow;Pacing;Reduce amounts;Multiple swallow   Results Oral difficulties only;Suspect aspiration   Esophageal Phase of Swallow   Patient reports or presents with symptoms of esophageal dysphagia No   General Therapy Interventions   Planned Therapy Interventions Dysphagia Treatment   Dysphagia treatment Compensatory strategies for swallowing;Instruction of safe swallow strategies   Swallow Eval: Clinical Impressions   Skilled Criteria for Therapy Intervention Skilled criteria met.  Treatment indicated.   Functional Assessment Scale (FAS) 5   Treatment Diagnosis mild-moderate oropharyngeal dysphagia   Diet texture recommendations Full liquid;Thin liquids   Recommended Feeding/Eating Techniques alternate between small bites and sips of  food/liquid;check mouth frequently for oral residue/pocketing;hard swallow w/ each bite or sip;maintain upright posture during/after eating for 30 mins;small sips/bites   Demonstrates Need for Referral to Another Service occupational therapy;physical therapy;respiratory therapy;dietitian   Therapy Frequency 5 times/wk   Predicted Duration of Therapy Intervention (days/wks) 1 week   Anticipated Discharge Disposition extended care facility   Risks and Benefits of Treatment have been explained. Yes   Patient, family and/or staff in agreement with Plan of Care Yes   Clinical Impression Comments SLP: Clinical swallow eval completed per MD orders. Pt presents with mild oropharyngeal dysphagia s/p extubation. Pt with overt s/sx of aspiration marked by cough x1 on large sip of thin liquids. No other overt s/sx of aspiration with cues/assistance to take small single sips on thin liquids. Pt tolerated nectar thick liquids, pureed textures, and semisolid textures with no overt s/sx of aspiration. Pt required prolonged time for mastication on semisolid textures which resulted in oral residuals. Recommend pt initiate full liquids with intermittent supervision. Pt should be fully upright and alert for all PO, take small sips/bites, slow pacing, and alternate between consistencies. ST to continue to follow for diet tolerance and advanced trials as appropriate. Anticipate pt will require ongoing ST upon discharge.    Total Evaluation Time   Total Evaluation Time (Minutes) 11

## 2019-02-26 NOTE — PLAN OF CARE
D: Admitted 2/17, s/p OHT 2/24. POD 1 --> 2.     Overnight:  1) Pneumoperitoneum noted on Cxr. Team ordering ABD X-ray for conformation. Patient endorsing abdominal discomfort, however difficult to assess with (dilaudid causing) confusing, noted below. No further intervention required at this time, per CVTS.   2) CVP increasing overnight. Cards 2 informed, 60 mg total of Lasix given overnight. Adequate UO response. However, CVPs still increasing (16->22->24). Cards 2 fellow aware, performing bedside Echo and wedging patient = 12. No further intervention required at this time.  3) Dilaudid seeming to confuse patient with subsequent doses. CVTS informed fentanyl IV ordered for PRN pain control. As patient is NPO from failed bedside swallow screening.      I: Monitored vitals and assessed pt status.   Running: Isoproterenol (0.03 mcg/kg/min) + Epinephrine (0.02 mcg/kg/min)  PRN:. Dilaudid IV 0.5mg (x2) Fentanyl 50mg (x1)    A:  Hemodynamics: CVP = (16->22->24) PA = (30-34 / 20) CO = (3.1-3.8) CI = (1.9-2.3) SVO2 = (33-60) SVR = (5297-4552) PVR = (82.5-127) SV = (33-39)  Neuro: Tmax 99.9. Disoriented to time, confused overnight.Redirectable and cooperative. Pupils reactive. Endorsing incisional and ABD pain.   Resp: VSS on 4L NC, ETCO WNL overnight.   CV: SR-ST, HRs ()  MAPs (67-94), temp pacer never utilized.   : Paul in place with 30-175cc/hr.   GI: (-) Flatus. No BM.   Endo: Blood sugars monitored overnight, not intervention required.  Diet: NPO.  Skin:Dressings intact overnight, five CT to two pleurovacs, draining 0-40cc/hr.       P: Continue to monitor Pt status and report changes to treatment team.

## 2019-02-26 NOTE — PLAN OF CARE
Pt care provided 07:00 - 19:30    OHT 2/24 (POD#2). Low dose epi for low CI. Started straight rate heparin for SVC graft. Isuprel to maintain HR >95. Bowel regiment for abdominal discomfort. Per CT/CXR, free air present in abdo.     NEURO: Disoriented to time, +/-situation in AM. AOx4 in PM. Pain managed w/ PRN oxy 2x, fent 1x. Tmax 100, improved w/ tylenol.     CARDIAC: SR/ST 90-100s. Epi 0.02 for low CI (1.6). Hypertensive in AM, SVR trending up (high ~2300), started nipride. Straight rate heparin @ 500 for graft. Diuresed w/ 1-time IV Lasix.     RESP: >95% 4L NC. Good cough, using IS.      GI: Hypoactive BS in AM. C/o abdo discomfort. Gave suppository to no effect. Gave miralax. CT confirmed free air. +/- Insulin gtt (off-1 u/hr). Passed speech swallow. Full liquid diet.     : Voiding via Paul.    LINES:  PIV, RAYMUNDO Rocha. 5x CT.     TESTS: Abdo/pelv CT.    PLAN: Monitor mentation and HD#s. Notify provider of changes.

## 2019-02-26 NOTE — PLAN OF CARE
Discharge Planner SLP   Patient plan for discharge: none stated  Current status: SLP: Clinical swallow eval completed per MD orders. Pt presents with mild oropharyngeal dysphagia s/p extubation. Pt with overt s/sx of aspiration marked by cough x1 on large sip of thin liquids. No other overt s/sx of aspiration with cues/assistance to take small single sips on thin liquids. Pt tolerated nectar thick liquids, pureed textures, and semisolid textures with no overt s/sx of aspiration. Pt required prolonged time for mastication on semisolid textures which resulted in oral residuals. Recommend pt initiate full liquids with intermittent supervision. Pt should be fully upright and alert for all PO, take small sips/bites, slow pacing, and alternate between consistencies. ST to continue to follow for diet tolerance and advanced trials as appropriate. Anticipate pt will require ongoing ST upon discharge.   Barriers to return to prior living situation: dysphagia, weakness  Recommendations for discharge: TCU  Rationale for recommendations: pt would benefit from continued ST to safely return to baseline diet level        Entered by: Monique Christy 02/26/2019 12:23 PM

## 2019-02-26 NOTE — PROGRESS NOTES
Prior Authorization Approval    mycophenolate 250mg caps  Date Initiated: 2/26/2019  Date Completed: 2/26/2019  Prior Auth Type: B vs D                Status: Approved    Effective Date: 01/27/2019 - 12/31/2099  Copay:       [place] Filled: Yes    Insurance: NELLIE - Phone [phone] Fax [phone]  ID: [id number]  Case Number: [id number]   Submitted Via: Angel Pillai  [facility] Pharmacy Liaison  Ph: [phone] Page: [phone]

## 2019-02-26 NOTE — PROGRESS NOTES
CVTS PROGRESS NOTE  February 25, 2019      CO-MORBIDITIES:   Chronic systolic heart failure (H)  (primary encounter diagnosis)  Acute on chronic systolic congestive heart failure (H)    ASSESSMENT: Everton Larios is a 53 year old male with PMH etoh abuse, hypothyroidism, intermittent asthma, ulcerative colitis, Depression, Chronic HFrEF, NICM admitted with cardiogenic shock requiring subclavian balloon pump now s/p OHT 2/24/19 with Piyush House and Christopher.     TODAY'S PROGRESS:   CT scan obtained d/t concern for free air - no peritonitis, likely from small peritoneal defect made during redo operation  Switch dilaudid to fentanyl (confusion with dilaudid)  Start hydralazine today   Evaluated by speech- advance to FLD  Senna, miralax and suppository for constipation; enema if no result  Start fixed rate heparin today for Gor-irish graft    PLAN:  Neuro/ pain/ sedation:  - Monitor neurological status. Notify the MD for any acute changes in exam.  - Tylenol, oxycodone and PRN fentanyl     #Depression  PTA cymbalta 20mg     Pulmonary care:   - On 4L supplemental oxygen  - Supp oxygen to maintain SpO2 >92%    #Intermittent asthma  - PTA Montelukast ordered    Cardiovascular:  HR goal >95  - Monitor hemodynamic status.   - MAP >65. HR goal > 95.  - Epi, Isopreterenol gtt  - Start hydralazine today  - Holding asa and statin for now     GI care:   - Speech evaluated- advance to full liquid diet.  #constipation  - Senna, miralax and suppository for bowel regimen; enema if no result    #Intraperitoneal free air  - Small peritoneal defect made during redo operation, closed with stitches intra-op- likely source of free air. No peritoneal signs. Monitor.     #Pierce's esophagus  #Ulcerative colitis  -PTA Mesalamine     Fluids/ Electrolytes/ Nutrition:   - TKO for IV fluid hydration  - No indication for parenteral nutrition.    Renal/ Fluid Balance:    - Urine output is adequate so far.  - Will continue to monitor intake and  output.  - Diuresis as needed (received 60 mg lasix overnight/this am)  - PTA Tamsulosin      Endocrine:    # Stress hyperglycemia  - Insulin gtt  # hypothyroidism  - Home levothyroxine      ID/ Antibiotics:  - Perioperative antibiotics.   - Cefazolin to vancomycin and zosyn x 3 days.  - Holding CMV/PCP prophylaxis for now.   - Nystatin for thrush     Heme:     #Bypass of persistent left SVC to R atrial appendage with [place]-Davis graft  - Hgb goal >7. Daily CBC.  - No active concern for bleeding.  - Plan for fixed rate heparin to start today     Prophylaxis:    - Mechanical prophylaxis for DVT.  - Fixed rate heparin to start today.  - Protonix qd     Lines/ tubes/ drains:  - RIJ, [place], Arterial line, chest tube, maddox     Disposition:  - [facility] ICU.     Patient seen, findings and plan discussed with CVTS fellow.    Leyla Bowling MD  General Surgery PGY-3    ====================================    SUBJECTIVE:   CT scan obtained d/t free air seen on x-ray  Some abdominal discomfort, no peritoneal signs    OBJECTIVE:   1. VITAL SIGNS:   Temp:  [98.5  F (36.9  C)-99.9  F (37.7  C)] 98.6  F (37  C)  Heart Rate:  [] 96  Resp:  [16-26] 20  BP: (109)/(79) 109/79  MAP:  [66 mmHg-94 mmHg] 85 mmHg  Arterial Line BP: ()/(49-76) 126/67  SpO2:  [94 %-100 %] 100 %  Ventilation Mode: (S) CPAP/PS  (Continuous positive airway pressure with Pressure Support)  FiO2 (%): 40 %  Rate Set (breaths/minute): 22 breaths/min  Tidal Volume Set (mL): 450 mL  PEEP (cm H2O): 5 cmH2O  Pressure Support (cm H2O): 7 cmH2O  Oxygen Concentration (%): 40 %  Resp: 20      2. INTAKE/ OUTPUT:   I/O last 3 completed shifts:  In: 2127.24 [I.V.:1817.24; NG/GT:60]  Out: 2542 [Urine:2012; Chest Tube:530]    3. PHYSICAL EXAMINATION:     General: resting in bed  Neuro: alert, oriented, intermittently confused  Resp: CTAB on 4L oxygen  CV: Regular rate, regular rhythm, serosanguinous output from chest tubes  Abdomen: Soft, Non-distended, minimal tenderness  upper > lower  Incisions: c/d/i  Extremities: warm and well perfused    4. INVESTIGATIONS:   Arterial Blood Gases   Recent Labs   Lab 02/25/19  1717 02/25/19  1502 02/25/19  0918 02/25/19  0350   PH 7.32* 7.31* 7.33* 7.39   PCO2 36 51* 46* 42   PO2 110* 75* 125* 87   HCO3 19* 26 24 25     Complete Blood Count   Recent Labs   Lab 02/26/19  1006 02/26/19  0333 02/25/19  2210 02/25/19  1502   WBC 19.4* 22.2* 22.1* 23.2*   HGB 10.2* 10.9* 11.1* 11.3*   * 163 145* 156     Basic Metabolic Panel  Recent Labs   Lab 02/26/19  1006 02/26/19  0333 02/25/19  2210 02/25/19  1502    145* 142 142   POTASSIUM 3.9 4.4 4.9 4.5   CHLORIDE 106 110* 107 107   CO2 25 23 25 24   BUN 50* 45* 45* 38*   CR 1.56* 1.58* 1.54* 1.48*   * 118* 139* 120*     Liver Function Tests  Recent Labs   Lab 02/25/19  1502 02/24/19  1817 02/24/19  1556 02/24/19  0340 02/23/19  1835   * 92*  --   --  21   ALT 28 24  --   --  15   ALKPHOS 89 96  --   --  169*   BILITOTAL 1.4* 2.7*  --   --  1.6*   ALBUMIN 3.4 2.4*  --   --  3.4   INR 1.29* 1.50* 1.57* 1.19* 1.20*     Pancreatic Enzymes  Recent Labs   Lab 02/23/19  1835   AMYLASE 22*     Coagulation Profile  Recent Labs   Lab 02/25/19  1502 02/24/19  1817 02/24/19  1556 02/24/19  0340 02/23/19  1835   INR 1.29* 1.50* 1.57* 1.19* 1.20*   PTT 32 55* 38*  --  41*         5. RADIOLOGY:   Recent Results (from the past 24 hour(s))   XR Chest Port 1 View   Result Value    Radiologist flags Pneumoperitoneum (Urgent)    Narrative    Exam: XR CHEST PORT 1 VW, 2/26/2019 1:50 AM    Indication: confirm PA catheter position    Comparison: Chest radiograph dated 2/25/2019    Findings:   Single AP view of the chest. Right IJ approach [place]-Liz catheter with  the tip projecting in the right main pulmonary artery. Mediastinal and  bilateral chest drains in place, unchanged in position. Intact median  sternotomy wires. Additional lines and hardware presumed exterior to  the patient.    There is air  projecting under the right hemidiaphragm. The cardiac  silhouette is stable. Patchy airspace opacities most prominent in the  upper lungs are slightly improved. No new focal consolidation. No  large pleural effusion or pneumothorax.      Impression    Impression:   1. Pulmonary artery catheter with the tip projecting at the level of  the right main pulmonary artery.  2.  Pneumoperitoneum.  3. Patchy airspace opacities most prominent in the upper lobes are  slightly improved.  4. Stable support devices as detailed above.    [Urgent Result: Pneumoperitoneum]    Finding was identified on 2/26/2019 2:55 AM.     Rajeev Jaeger MD  was contacted by Dr. Danny Fleming DO (Radiology R2)  at 2/26/2019 2:59 AM and verbalized understanding of the urgent  finding.     I have personally reviewed the examination and initial interpretation  and I agree with the findings.    ZAC MIRZA MD   XR Abdomen Port 1 View   Result Value    Radiologist flags Pneumoperitoneum (Urgent)    Narrative    Exam: XR ABDOMEN PORT 1 VW, 2/26/2019 3:28 AM    Indication: evaluate for pneumoperitoneum    Comparison: Chest radiograph dated 2/26/2019, abdominal radiograph  dated 2/24/2019.    Findings:   Supine AP view of the abdomen. Multiple inferior approach mediastinal  and chest drains in place. The end extending beyond the field-of-view.  Partially visualized [place]-Liz catheter. Partially visualized  sternotomy wires.     Pneumoperitoneum with air under the diaphragm, and the regular sign in  the left hemidiaphragm. No pneumatosis or portal venous gas. There is  a loop of mildly distended bowel in the left hemiabdomen measuring up  to 3 cm in diameter. No focal airspace consolidation in the visualized  portion of the right hemithorax. The left lower lung is collimated out  of view.      Impression    Impression:   1. Pneumoperitoneum.  2. There is a loop of mildly distended bowel in the left hemiabdomen.  This could represent early small bowel  obstruction.  3. Multiple partially visualized lines as detailed above, which appear  grossly stable in position.    [Urgent Result: Pneumoperitoneum]    Finding was identified on 2/26/2019 3:52 AM.     Rajeev Jaeger M.D. was contacted by Dr. Danny Fleming DO (Radiology R2)  at 2/26/2019 3:54 AM and verbalized understanding of the urgent  finding.     I have personally reviewed the examination and initial interpretation  and I agree with the findings.    ZAC MIRZA MD   CT Abdomen Pelvis w/o Contrast   Result Value    Radiologist flags Small retroperitoneal hemorrhage (Urgent)    Narrative    EXAMINATION: CT ABDOMEN PELVIS W/O CONTRAST, 2/26/2019 9:27 AM.    TECHNIQUE: Helical CT images from the lung bases through the symphysis  pubis were obtained without IV contrast.     COMPARISON: 2/26/2019, 2/18/2019.    HISTORY: Abd distension. Free air above liver, abdominal pain, post  heart transplant.    FINDINGS:    Abdomen and pelvis:   Subcentimeter hypodensities in the liver are too small to  characterize; other hepatic hypodensities are consistent with simple  hepatic cysts. Hyperdense fluid within the gallbladder, likely  representing vicarious excretion of contrast. The spleen, adrenal  glands, and pancreas demonstrate normal noncontrast appearance. Focal  cortical scarring in the mid left kidney. Mild right pelvocaliectasis.  A Paul catheter is positioned within the urinary bladder. A moderate  volume of antidependent air within the urinary bladder.    Small to moderate volume of pneumoperitoneum. No intra-abdominal free  fluid. Large right colonic stool burden. The appendix is not well  visualized. Normal caliber abdominal aorta. No lymphadenopathy in the  abdomen or pelvis. Small hematoma superficial to the distal right  external iliac vessels and extending along the right lateral paracolic  gutter.    Lower chest:   The heart is mildly enlarged. Partially imaged pacemaker lead is seen  in the right  ventricle. Epicardial pacing wires are in appropriate  position. Appropriately positioned pericardial drain. Partially imaged  bypass from left superior vena cava to the right atrial appendage. No  significant pericardial effusion or hemopericardium. Trace  pneumothoraces bilaterally. Bibasilar atelectasis, left greater than  right. Partially imaged the sternal drains and chest tubes, with the  right chest tube tip positioned deeply within the lateral right  costophrenic angle however no diaphragmatic discontinuity is seen or  suspected.    Bones and soft tissues:   Subcutaneous lipomas in the right flank. Mild subcutaneous edema  throughout. No acute or worrisome osseous lesions.        Impression    IMPRESSION:   1. Postsurgical changes of cardiac transplantation with trace  pneumothoraces bilaterally and mild bibasilar atelectasis.  2. Postoperative small to moderate pneumoperitoneum without  intra-abdominal free fluid to suggest bowel injury and no  diaphragmatic injury visualized.  3. Small retroperitoneal hematoma extending superiorly from anterior  to the right external iliac vessels.      [Access Center: Small retroperitoneal hemorrhage]    This report will be copied to the [facility] to ensure a  provider acknowledges the finding. Access Center is available Monday  through Friday 8am-3:30 pm.     I have personally reviewed the examination and initial interpretation  and I agree with the findings.    SOCORRO ORR MD       =========================================

## 2019-02-26 NOTE — PLAN OF CARE
Discharge Planner PT   Patient plan for discharge: not discussed today  Current status: Ambulates 3ft forward and back x2 with CGAx1, no AD.  Gait limited by lines and pt SOB despite sating >93% throughout session.  Supine to sit with HOB elevated 45 deg with min Ax1.  STS x3 with CGAx1, no AD. Transfers from bed to chair with CGAx1, no AD.    Barriers to return to prior living situation: weakness, pain, A with mobility  Recommendations for discharge: currently would need rehab but anticipate home with A from parents and OP PT.   Rationale for recommendations: Pt currently below baseline, limited by pain and lines primarily.  With progress with PT and stairs, pt likely safe to discharge home.        Entered by: Laureano Seymour 02/26/2019 4:57 PM

## 2019-02-26 NOTE — PROGRESS NOTES
CV ICU PROGRESS NOTE  February 25, 2019      CO-MORBIDITIES:   Chronic systolic heart failure (H)  (primary encounter diagnosis)  Acute on chronic systolic congestive heart failure (H)    ASSESSMENT: Everton Larios is a 53 year old male with PMH etoh abuse, hypothyroidism, intermittent asthma, ulcerative colitis, Depression, Chronic HFrEF, NICM admitted with cardiogenic shock requiring subclavian balloon pump now s/p OHT 2/24/19 with Piyush House and Christopher.     TODAY'S PROGRESS:   CT scan obtained d/t concern for free air - no peritonitis, likely from small peritoneal defect made during redo operation  Switch dilaudid to fentanyl (confusion with dilaudid)  Start hydralazine today   Evaluated by speech- advance to FLD  Senna, miralax and suppository for constipation; enema if no result  Start fixed rate heparin today for Gor-irish graft    PLAN:  Neuro/ pain/ sedation:  - Monitor neurological status. Notify the MD for any acute changes in exam.  - Tylenol, oxycodone and PRN fentanyl     #Depression  PTA cymbalta 20mg     Pulmonary care:   - On 4L supplemental oxygen  - Supp oxygen to maintain SpO2 >92%    #Intermittent asthma  - PTA Montelukast ordered    Cardiovascular:  HR goal >95  - Monitor hemodynamic status.   - MAP >65. HR goal > 95.  - Epi, Isopreterenol gtt  - Start hydralazine today  - Holding asa and statin for now     GI care:   - Speech evaluated- advance to full liquid diet.  #constipation  - Senna, miralax and suppository for bowel regimen; enema if no result    #Intraperitoneal free air  - Small peritoneal defect made during redo operation, closed with stitches intra-op- likely source of free air. No peritoneal signs. Monitor.     #Pierce's esophagus  #Ulcerative colitis  -PTA Mesalamine     Fluids/ Electrolytes/ Nutrition:   - TKO for IV fluid hydration  - No indication for parenteral nutrition.    Renal/ Fluid Balance:    - Urine output is adequate so far.  - Will continue to monitor intake and  output.  - Diuresis as needed (received 60 mg lasix overnight/this am)  - PTA Tamsulosin      Endocrine:    # Stress hyperglycemia  - Insulin gtt  # hypothyroidism  - Home levothyroxine      ID/ Antibiotics:  - Perioperative antibiotics.   - Cefazolin to vancomycin and zosyn x 3 days.  - Holding CMV/PCP prophylaxis for now.   - Nystatin for thrush     Heme:     #Bypass of persistent left SVC to R atrial appendage with [place]-Davis graft  - Hgb goal >7. Daily CBC.  - No active concern for bleeding.  - Plan for fixed rate heparin to start today     Prophylaxis:    - Mechanical prophylaxis for DVT.  - Fixed rate heparin to start today.  - Protonix qd     Lines/ tubes/ drains:  - RIJ, [place], Arterial line, chest tube, maddox     Disposition:  - [facility] ICU.     Patient seen, findings and plan discussed with CV ICU staff, Dr. Mederos.    Leyla Bowling MD  General Surgery PGY-3    ====================================    SUBJECTIVE:   CT scan obtained d/t free air seen on x-ray  Some abdominal discomfort, no peritoneal signs    OBJECTIVE:   1. VITAL SIGNS:   Temp:  [98.5  F (36.9  C)-99.9  F (37.7  C)] 98.6  F (37  C)  Heart Rate:  [] 96  Resp:  [16-28] 20  BP: (109-110)/(69-79) 109/79  MAP:  [60 mmHg-94 mmHg] 85 mmHg  Arterial Line BP: ()/(36-76) 126/67  SpO2:  [91 %-100 %] 100 %  Ventilation Mode: (S) CPAP/PS  (Continuous positive airway pressure with Pressure Support)  FiO2 (%): 40 %  Rate Set (breaths/minute): 22 breaths/min  Tidal Volume Set (mL): 450 mL  PEEP (cm H2O): 5 cmH2O  Pressure Support (cm H2O): 7 cmH2O  Oxygen Concentration (%): 40 %  Resp: 20      2. INTAKE/ OUTPUT:   I/O last 3 completed shifts:  In: 2127.24 [I.V.:1817.24; NG/GT:60]  Out: 2542 [Urine:2012; Chest Tube:530]    3. PHYSICAL EXAMINATION:     General: resting in bed  Neuro: alert, oriented, intermittently confused  Resp: CTAB on 4L oxygen  CV: Regular rate, regular rhythm, serosanguinous output from chest tubes  Abdomen: Soft, Non-distended,  minimal tenderness upper > lower  Incisions: c/d/i  Extremities: warm and well perfused    4. INVESTIGATIONS:   Arterial Blood Gases   Recent Labs   Lab 02/25/19  1717 02/25/19  1502 02/25/19  0918 02/25/19  0350   PH 7.32* 7.31* 7.33* 7.39   PCO2 36 51* 46* 42   PO2 110* 75* 125* 87   HCO3 19* 26 24 25     Complete Blood Count   Recent Labs   Lab 02/26/19  0333 02/25/19  2210 02/25/19  1502 02/25/19  0407   WBC 22.2* 22.1* 23.2* 13.4*   HGB 10.9* 11.1* 11.3* 11.1*    145* 156 141*     Basic Metabolic Panel  Recent Labs   Lab 02/26/19  0333 02/25/19 2210 02/25/19  1502 02/25/19  0918 02/25/19  0407   * 142 142  --  143   POTASSIUM 4.4 4.9 4.5 4.2 3.2*   CHLORIDE 110* 107 107  --  108   CO2 23 25 24  --  23   BUN 45* 45* 38*  --  35*   CR 1.58* 1.54* 1.48*  --  1.25   * 139* 120*  --  178*     Liver Function Tests  Recent Labs   Lab 02/25/19  1502 02/24/19 1817 02/24/19  1556 02/24/19  0340 02/23/19  1835   * 92*  --   --  21   ALT 28 24  --   --  15   ALKPHOS 89 96  --   --  169*   BILITOTAL 1.4* 2.7*  --   --  1.6*   ALBUMIN 3.4 2.4*  --   --  3.4   INR 1.29* 1.50* 1.57* 1.19* 1.20*     Pancreatic Enzymes  Recent Labs   Lab 02/23/19  1835   AMYLASE 22*     Coagulation Profile  Recent Labs   Lab 02/25/19  1502 02/24/19 1817 02/24/19  1556 02/24/19  0340 02/23/19  1835   INR 1.29* 1.50* 1.57* 1.19* 1.20*   PTT 32 55* 38*  --  41*         5. RADIOLOGY:   Recent Results (from the past 24 hour(s))   XR Chest Port 1 View   Result Value    Radiologist flags Pneumoperitoneum (Urgent)    Narrative    Exam: XR CHEST PORT 1 VW, 2/26/2019 1:50 AM    Indication: confirm PA catheter position    Comparison: Chest radiograph dated 2/25/2019    Findings:   Single AP view of the chest. Right IJ approach [place]-Liz catheter with  the tip projecting in the right main pulmonary artery. Mediastinal and  bilateral chest drains in place, unchanged in position. Intact median  sternotomy wires. Additional lines  and hardware presumed exterior to  the patient.    There is air projecting under the right hemidiaphragm. The cardiac  silhouette is stable. Patchy airspace opacities most prominent in the  upper lungs are slightly improved. No new focal consolidation. No  large pleural effusion or pneumothorax.      Impression    Impression:   1. Pulmonary artery catheter with the tip projecting at the level of  the right main pulmonary artery.  2.  Pneumoperitoneum.  3. Patchy airspace opacities most prominent in the upper lobes are  slightly improved.  4. Stable support devices as detailed above.    [Urgent Result: Pneumoperitoneum]    Finding was identified on 2/26/2019 2:55 AM.     Rajeev Jaeger MD  was contacted by Dr. Danny Fleming DO (Radiology R2)  at 2/26/2019 2:59 AM and verbalized understanding of the urgent  finding.     I have personally reviewed the examination and initial interpretation  and I agree with the findings.    ZAC MIRZA MD   XR Abdomen Port 1 View   Result Value    Radiologist flags Pneumoperitoneum (Urgent)    Narrative    Exam: XR ABDOMEN PORT 1 VW, 2/26/2019 3:28 AM    Indication: evaluate for pneumoperitoneum    Comparison: Chest radiograph dated 2/26/2019, abdominal radiograph  dated 2/24/2019.    Findings:   Supine AP view of the abdomen. Multiple inferior approach mediastinal  and chest drains in place. The end extending beyond the field-of-view.  Partially visualized [place]-Liz catheter. Partially visualized  sternotomy wires.     Pneumoperitoneum with air under the diaphragm, and the regular sign in  the left hemidiaphragm. No pneumatosis or portal venous gas. There is  a loop of mildly distended bowel in the left hemiabdomen measuring up  to 3 cm in diameter. No focal airspace consolidation in the visualized  portion of the right hemithorax. The left lower lung is collimated out  of view.      Impression    Impression:   1. Pneumoperitoneum.  2. There is a loop of mildly distended bowel in  the left hemiabdomen.  This could represent early small bowel obstruction.  3. Multiple partially visualized lines as detailed above, which appear  grossly stable in position.    [Urgent Result: Pneumoperitoneum]    Finding was identified on 2/26/2019 3:52 AM.     Rajeev Jaeger M.D. was contacted by Dr. Danny Fleming DO (Radiology R2)  at 2/26/2019 3:54 AM and verbalized understanding of the urgent  finding.     I have personally reviewed the examination and initial interpretation  and I agree with the findings.    ZAC MIRZA MD   CT Abdomen Pelvis w/o Contrast   Result Value    Radiologist flags Small retroperitoneal hemorrhage (Urgent)    Narrative    EXAMINATION: CT ABDOMEN PELVIS W/O CONTRAST, 2/26/2019 9:27 AM.    TECHNIQUE: Helical CT images from the lung bases through the symphysis  pubis were obtained without IV contrast.     COMPARISON: 2/26/2019, 2/18/2019.    HISTORY: Abd distension. Free air above liver, abdominal pain, post  heart transplant.    FINDINGS:    Abdomen and pelvis:   Subcentimeter hypodensities in the liver are too small to  characterize; other hepatic hypodensities are consistent with simple  hepatic cysts. Hyperdense fluid within the gallbladder, likely  representing vicarious excretion of contrast. The spleen, adrenal  glands, and pancreas demonstrate normal noncontrast appearance. Focal  cortical scarring in the mid left kidney. Mild right pelvocaliectasis.  A Paul catheter is positioned within the urinary bladder. A moderate  volume of antidependent air within the urinary bladder.    Small to moderate volume of pneumoperitoneum. No intra-abdominal free  fluid. Large right colonic stool burden. The appendix is not well  visualized. Normal caliber abdominal aorta. No lymphadenopathy in the  abdomen or pelvis. Small hematoma superficial to the distal right  external iliac vessels and extending along the right lateral paracolic  gutter.    Lower chest:   The heart is mildly enlarged.  Partially imaged pacemaker lead is seen  in the right ventricle. Epicardial pacing wires are in appropriate  position. Appropriately positioned pericardial drain. Partially imaged  bypass from left superior vena cava to the right atrial appendage. No  significant pericardial effusion or hemopericardium. Trace  pneumothoraces bilaterally. Bibasilar atelectasis, left greater than  right. Partially imaged the sternal drains and chest tubes, with the  right chest tube tip positioned deeply within the lateral right  costophrenic angle however no diaphragmatic discontinuity is seen or  suspected.    Bones and soft tissues:   Subcutaneous lipomas in the right flank. Mild subcutaneous edema  throughout. No acute or worrisome osseous lesions.        Impression    IMPRESSION:   1. Postsurgical changes of cardiac transplantation with trace  pneumothoraces bilaterally and mild bibasilar atelectasis.  2. Postoperative small to moderate pneumoperitoneum without  intra-abdominal free fluid to suggest bowel injury and no  diaphragmatic injury visualized.  3. Small retroperitoneal hematoma extending superiorly from anterior  to the right external iliac vessels.      [Access Center: Small retroperitoneal hemorrhage]    This report will be copied to the [facility] to ensure a  provider acknowledges the finding. Access Center is available Monday  through Friday 8am-3:30 pm.     I have personally reviewed the examination and initial interpretation  and I agree with the findings.    SOCORRO ORR MD       =========================================

## 2019-02-26 NOTE — PHARMACY-TRANSPLANT NOTE
Adult Heart/Lung Transplant Post Operative Note    53 year old male s/p heart transplant on 02/24/2019 for non-ischemic cardiomyopathy.      Planned immunosuppression regimen to include tacrolimus (goal 10-12 mcg/L), mycophenolate and prednisone.      Opportunistic pathogen prophylaxis includes: trimethoprim/sulfamethoxazole, nystatin and valganciclovir.    Patient is not enrolled in medication study.    Patient with planned immunosuppression and prophylaxis as above.  Pharmacy will monitor for medication interactions and immunosuppression levels in conjunction with the team. Medication therapy needs for discharge planning will continue to be addressed throughout the current admission via multidisciplinary rounds and order review. Pharmacy will make recommendations as appropriate.    Dyana Alejo, Maya  CVICU and Advanced Heart Failure Pharmacist  Pager [phone]

## 2019-02-26 NOTE — PROGRESS NOTES
CVTS PROGRESS NOTE  February 25, 2019      CO-MORBIDITIES:   Chronic systolic heart failure (H)  (primary encounter diagnosis)  Acute on chronic systolic congestive heart failure (H)  Acute on chronic systolic congestive heart failure (H)    ASSESSMENT: Everton Larios is a 53 year old male with PMH etoh abuse, hypothyroidism, intermittent asthma, ulcerative colitis, Depression, Chronic HFrEF, NICM admitted with cardiogenic shock requiring subclavian balloon pump now s/p OHT 2/24/19 with Piyush House and Christopher.     TODAY'S PROGRESS:   Decrease HR goal to 95, titrate isopreterenol  Wean epi as able  Extubated this morning  Swallow evaluation  Start senna for bowel regimen  Lasix 40 mg x 1  Cefazolin -> vancomycin and zosyn  Interposition graft for persistent left SVC - plan to start anticoagulation tomorrow    PLAN:  Neuro/ pain/ sedation:  - Monitor neurological status. Notify the MD for any acute changes in exam.  - Discontinue fentanyl gtt, start tylenol, oxycodone and dilaudid  - Weaned sedation off     #Depression  PTA cymbalta 20mg     Pulmonary care:   - MV-> extubated  - Supp oxygen to maintain SpO2 >92%    #Intermittent asthma  - PTA Montelukast ordered    Cardiovascular:  HR goal >95  - Monitor hemodynamic status.   - MAP >65. HR goal > 95.  - Epi, Isopreterenol gtt  - Likely to transition to terbutaline tomorrow     GI care:   - NPO except ice chips and medications.  - Swallow evaluation pending.    #Pierce's esophagus  #Ulcerative colitis  -PTA Mesalamine     Fluids/ Electrolytes/ Nutrition:   - TKO for IV fluid hydration  - No indication for parenteral nutrition.    Renal/ Fluid Balance:    - Urine output is adequate so far.  - Will continue to monitor intake and output.  - PTA Tamsulosin ordered     Endocrine:    # Stress hyperglycemia  - Insulin gtt     ID/ Antibiotics:  - Perioperative antibiotics.   - Switch cefazolin to vancomycin and zosyn x 3 days.     Heme:     - Hgb goal >7  - No active  concern for bleeding.     Prophylaxis:    - Mechanical prophylaxis for DVT.  - No chemical DVT prophylaxis due to high risk of bleeding. Will start anticoagulation tomorrow.  - Protonix qd     Lines/ tubes/ drains:  - MAC, [facility], Arterial line, maddox     Disposition:  - CV ICU.     Patient seen, findings and plan discussed with CVTS fellow.    Leyla Bowling MD  General Surgery PGY-3    ====================================    SUBJECTIVE:   Extubated  Intermittent confusion, reorients   Epi weaned off    OBJECTIVE:   1. VITAL SIGNS:   Temp:  [97.7  F (36.5  C)-99.7  F (37.6  C)] 99.7  F (37.6  C)  Heart Rate:  [] 96  Resp:  [16-28] 24  BP: (110)/(69) 110/69  MAP:  [57 mmHg-83 mmHg] 68 mmHg  Arterial Line BP: ()/(31-67) 92/56  FiO2 (%):  [40 %] 40 %  SpO2:  [91 %-100 %] 95 %  Ventilation Mode: (S) CPAP/PS  (Continuous positive airway pressure with Pressure Support)  FiO2 (%): 40 %  Rate Set (breaths/minute): 22 breaths/min  Tidal Volume Set (mL): 450 mL  PEEP (cm H2O): 5 cmH2O  Pressure Support (cm H2O): 7 cmH2O  Oxygen Concentration (%): 40 %  Resp: 24      2. INTAKE/ OUTPUT:   I/O last 3 completed shifts:  In: 7346.72 [I.V.:2795.72; Other:838; NG/GT:120]  Out: 3586 [Urine:1732; Blood:1000; Chest Tube:854]    3. PHYSICAL EXAMINATION:     General: resting in bed  Neuro: extubated  Resp: MV, CTAB  CV: Tachycardic, serosanguinous output from chest tubes  Abdomen: Soft, Non-distended, Non-tender  Incisions: c/d/i  Extremities: warm and well perfused    4. INVESTIGATIONS:   Arterial Blood Gases   Recent Labs   Lab 02/25/19  1717 02/25/19  1502 02/25/19  0918 02/25/19  0350   PH 7.32* 7.31* 7.33* 7.39   PCO2 36 51* 46* 42   PO2 110* 75* 125* 87   HCO3 19* 26 24 25     Complete Blood Count   Recent Labs   Lab 02/25/19  1502 02/25/19  0407 02/24/19  2140 02/24/19  1817  02/24/19  1459  02/24/19  0340   WBC 23.2* 13.4*  --  19.9*  --   --   --  7.2   HGB 11.3* 11.1* 12.5* 12.0*   < >  --    < > 14.9    141*   --  158  --  92*  --  132*    < > = values in this interval not displayed.     Basic Metabolic Panel  Recent Labs   Lab 02/25/19  1502 02/25/19  0918 02/25/19  0407 02/24/19  2140 02/24/19  1817 02/24/19  1723  02/24/19  0340     --  143  --  144 144   < > 135   POTASSIUM 4.5 4.2 3.2* 4.0 3.7 3.1*   < > 3.3*   CHLORIDE 107  --  108  --  106  --   --  95   CO2 24  --  23  --  23  --   --  27   BUN 38*  --  35*  --  27  --   --  25   CR 1.48*  --  1.25  --  1.06  --   --  0.90   *  --  178*  --  130* 155*   < > 109*    < > = values in this interval not displayed.     Liver Function Tests  Recent Labs   Lab 02/25/19  1502 02/24/19  1817 02/24/19  1556 02/24/19  0340 02/23/19  1835   * 92*  --   --  21   ALT 28 24  --   --  15   ALKPHOS 89 96  --   --  169*   BILITOTAL 1.4* 2.7*  --   --  1.6*   ALBUMIN 3.4 2.4*  --   --  3.4   INR 1.29* 1.50* 1.57* 1.19* 1.20*     Pancreatic Enzymes  Recent Labs   Lab 02/23/19  1835   AMYLASE 22*     Coagulation Profile  Recent Labs   Lab 02/25/19  1502 02/24/19  1817 02/24/19  1556 02/24/19  0340 02/23/19  1835   INR 1.29* 1.50* 1.57* 1.19* 1.20*   PTT 32 55* 38*  --  41*         5. RADIOLOGY:   Recent Results (from the past 24 hour(s))   XR Chest Port 1 View    Narrative    Exam: XR CHEST PORT 1 VW, 2/25/2019 1:10 AM    Indication: confirm PA catheter position    Comparison: Chest radiograph dated 2/24/2019.    Findings:   Single AP view of the chest. Portion of the right costophrenic angle  collimated out of view. Right IJ approach [place]-Liz catheter with the  tip projecting over the right main pulmonary artery. Bilateral basilar  chest tubes and mediastinal drains are stable in position. The distal  end of the right chest tube not visualized on this exam. Endotracheal  tube in place with the tip projecting in the mid thoracic trachea.  Enteric tube in place with the distal end extending beyond the  field-of-view. Postsurgical changes of heart transplant with  intact  median sternotomy wires.     Patchy airspace opacities most prominent in the upper lungs are  slightly improved as compared to prior exam. New areas of linear  atelectasis in the right upper lung. Stable areas of linear  atelectasis in the left midlung. No large pleural effusion or  pneumothorax. Stable enlargement of the cardiac silhouette.  ,    Impression    Impression:   1. [place]-Liz catheter in place with the tip projecting at the level of  the right main pulmonary artery. Additional lines and support devices  as detailed above.  2. Patchy airspace opacities most prominent in the upper lungs are  slightly improved.    I have personally reviewed the examination and initial interpretation  and I agree with the findings.    ZAC MIRZA MD       =========================================

## 2019-02-26 NOTE — PROGRESS NOTES
ADVANCED HEART FAILURE PROGRESS NOTE    SUBJECTIVE:  Overnight patient's cardiac output dropped so epinepherine drip restarted at 0.02 with improvement. CVP also increased to 24, received lasix 40 IV with good urine output and CVP improved this morning. Also noted have pneumoperitoneum on xray, having some mild abdominal tenderness, CT abdomen ordered. Otherwise denies acute complaints this morning but noted to be intermittently confused.    ROS otherwise negative.    OBJECTIVE:  Vital signs:  Temp: 99.9  F (37.7  C) Temp  Min: 98.5  F (36.9  C)  Max: 99.9  F (37.7  C)  Heart Rate: 98 Heart Rate  Min: 93  Max: 104  BP: 109/79 Systolic (24hrs), Av , Min:109 , Max:110   Diastolic (24hrs), Av, Min:69, Max:79    Resp: 17 Resp  Min: 16  Max: 28  SpO2: 96 % SpO2  Min: 91 %  Max: 100 %    [place]: RA 18, PA 28/16, PCW 12, PA sat 60%, CO/CI 3.3/2.0      Intake/Output Summary (Last 24 hours) at 2019 0644  Last data filed at 2019 0600  Gross per 24 hour   Intake 2127.24 ml   Output 2542 ml   Net -414.76 ml       Vitals:    19 0400 19 0600 19 0400   Weight: 54.7 kg (120 lb 8 oz) 58 kg (127 lb 13.9 oz) 57.7 kg (127 lb 3.3 oz)       Physical Exam:  Gen: awake, alert, appears comfortable  HEENT: PERRL, moist mucosa  Resp: clear bilaterally, no rales or wheezes  CVS: tachycardic, regular, no murmurs, slight rub audible  Abdo: soft, nondistended, mild tenderness on palpation  Extremities: warm, no edema, normal pulses  Neuro: intermittently disoriented and confused but pleasant and cooperative, following commands, no focal deficits      Medications:    IV fluid REPLACEMENT ONLY       EPINEPHrine IV infusion ADULT 0.02 mcg/kg/min (19)     insulin (regular) Stopped (19)     isoproterenol (ISUPREL) infusion ADULT 0.03 mcg/kg/min (19)     - MEDICATION INSTRUCTIONS -       Reason anticoagulation order not selected       - MEDICATION INSTRUCTIONS -       BETA BLOCKER NOT  PRESCRIBED         Current Facility-Administered Medications   Medication Dose Route Frequency     DULoxetine  20 mg Oral Daily     fluticasone  2 puff Inhalation Q12H     heparin lock flush  5 mL Intravenous Q24H     levothyroxine  100 mcg Oral Daily     lidocaine  1 patch Transdermal Q24H     lidocaine   Transdermal Q8H     lidocaine   Transdermal Q24h     mesalamine  2,400 mg Oral BID     methylPREDNISolone  24 mg Intravenous Q12H    Followed by     [START ON 2/27/2019] methylPREDNISolone  20 mg Intravenous Q12H    Followed by     [START ON 3/1/2019] methylPREDNISolone  16 mg Intravenous Q12H     montelukast  10 mg Oral At Bedtime     mycophenolate mofetil  1,500 mg Intravenous BID     omeprazole  40 mg Oral or Feeding Tube QPM     piperacillin-tazobactam  3.375 g Intravenous Q6H     potassium chloride  40 mEq Oral Daily     sennosides  5 mL Oral or Feeding Tube BID     sodium chloride (PF)  3 mL Intracatheter Q8H     vancomycin (VANCOCIN) IV  1,000 mg Intravenous Q24H         Labs:  CMP  Recent Labs   Lab 02/26/19  0333 02/25/19  2210 02/25/19  1502  02/24/19  1817   * 142 142   < > 144   POTASSIUM 4.4 4.9 4.5   < > 3.7   CHLORIDE 110* 107 107   < > 106   CO2 23 25 24   < > 23   BUN 45* 45* 38*   < > 27   CR 1.58* 1.54* 1.48*   < > 1.06   RADAMES 8.3* 8.7 8.9   < > 8.4*   MAG 2.6* 2.9* 3.0*   < > 3.0*   PHOS 7.4*  --  6.8*  --  2.7   * 139* 120*   < > 130*   LDH  --   --   --   --  536*   ALKPHOS  --   --  89  --  96   BILITOTAL  --   --  1.4*  --  2.7*   AST  --   --  110*  --  92*   ALT  --   --  28  --  24    < > = values in this interval not displayed.     BLOOD GAS  Recent Labs   Lab 02/25/19  1717 02/25/19  1502   PH 7.32* 7.31*   PCO2 36 51*   PO2 110* 75*     CBC  Recent Labs   Lab 02/26/19  0333 02/25/19  2210   WBC 22.2* 22.1*   HGB 10.9* 11.1*   HCT 34.5* 35.1*    145*     COAG  Recent Labs   Lab 02/25/19  1502 02/24/19  1817   INR 1.29* 1.50*   PTT 32 55*          ASSESSMENT/PLAN:  Everton Larios is a 54 yo gentleman with a h/o NICM, HFrEF (EF 15%) s/p ICD placement (2008, generator change 2018), congenital ASD repair in childhood, paroxysmal atrial fibrillation s/p ablation and cardioversion, hypothyroidism, EtOH abuse, ulcerative colitis, remote history of GI bleed with splenic embolization, and a recent admission in 11/2018 for ambulatory cardiogenic shock requiring initiation of IV inotropes, who was admitted for heart transplant, now status 2 with a L subclavian balloon pump.     #S/p OHT on 2/24:  Graft function:  - BP stable  - continue epi 0.02 for support  - continue isuprel for HR support  - post op antibiotic coverage with vanc/zosyn  - will continue diuresis prn     Immunosuppression:  - on MMF 1500 BID  - s/p methylpred 125 x 3 doses, now on methylpred taper while not tolerating PO  - NOT Simulect protocol, plan to start tacro 0.5 BID     Prophylaxis:  - CMV +/+: medium risk, holding Valcyte for now  - PCP: holding Bactrim for now  - Thrush: start nystatin  - GI: on PPI  - CAV: holding ASA and statin for now      Goretex conduit for persistent left SVC:  - will need anticoagulation, plan fixed rate heparin today        # Hypothyroidism: continue PTA levothyroxine  # Ulcerative colitis: currently in remission, continue PTA mesalamine  # GERD: continue PTA omeprazole  # BPH: continue PTA tamsulosin  # Depression: continue PTA duloxetine  # H/o EtOH abuse: currently denies any EtOH since 4 months ago        Seen and staffed with Dr. Jose Alfredo Young.      Trev Harris MD  Advanced Heart Failure Fellow  [phone]

## 2019-02-26 NOTE — PLAN OF CARE
OT/CR 4E: Discharge Planner OT   Patient plan for discharge: not discussed  Current status: Pt is alert and conversational; speech and general cognition improving since yesterday though remains somewhat disoriented to time and with memory deficits present.  Pt mod A x 2 supine to EOB, CGA-Min A x 2 sit to stand and transferring to bedside chair.  Able to engage in g/h tasks while seated with SBA and vc.  VSS - limited by sternal and abdominal pain.   Barriers to return to prior living situation: impaired cognition, weakness, decreased activity tolerance, pain, post surgical precautions, acute medical needs  Recommendations for discharge: ARU  Rationale for recommendations: Pt is currently below baseline with regards to mobility and independence with self cares and will benefit from continued skilled therapy intervention to address deficits.         Entered by: Polina Horton 02/26/2019 4:14 PM

## 2019-02-27 ENCOUNTER — HOSPITAL ENCOUNTER (OUTPATIENT)
Facility: CLINIC | Age: 54
End: 2019-02-27
Attending: INTERNAL MEDICINE | Admitting: INTERNAL MEDICINE
Payer: COMMERCIAL

## 2019-02-27 ENCOUNTER — HOSPITAL ENCOUNTER (OUTPATIENT)
Facility: CLINIC | Age: 54
End: 2019-02-27
Payer: COMMERCIAL

## 2019-02-27 ENCOUNTER — APPOINTMENT (OUTPATIENT)
Dept: PHYSICAL THERAPY | Facility: CLINIC | Age: 54
DRG: 001 | End: 2019-02-27
Attending: INTERNAL MEDICINE
Payer: COMMERCIAL

## 2019-02-27 ENCOUNTER — APPOINTMENT (OUTPATIENT)
Dept: GENERAL RADIOLOGY | Facility: CLINIC | Age: 54
DRG: 001 | End: 2019-02-27
Attending: PHYSICIAN ASSISTANT
Payer: COMMERCIAL

## 2019-02-27 ENCOUNTER — APPOINTMENT (OUTPATIENT)
Dept: SPEECH THERAPY | Facility: CLINIC | Age: 54
DRG: 001 | End: 2019-02-27
Attending: INTERNAL MEDICINE
Payer: COMMERCIAL

## 2019-02-27 DIAGNOSIS — Z94.1 HEART REPLACED BY TRANSPLANT (H): Primary | ICD-10-CM

## 2019-02-27 DIAGNOSIS — Z94.1 HEART REPLACED BY TRANSPLANT (H): ICD-10-CM

## 2019-02-27 LAB
ABO + RH BLD: NORMAL
ABO + RH BLD: NORMAL
ANION GAP SERPL CALCULATED.3IONS-SCNC: 12 MMOL/L (ref 3–14)
ANION GAP SERPL CALCULATED.3IONS-SCNC: 13 MMOL/L (ref 3–14)
ANION GAP SERPL CALCULATED.3IONS-SCNC: 14 MMOL/L (ref 3–14)
ANION GAP SERPL CALCULATED.3IONS-SCNC: 14 MMOL/L (ref 3–14)
APTT PPP: 38 SEC (ref 22–37)
BASE DEFICIT BLDV-SCNC: 0.5 MMOL/L
BASE DEFICIT BLDV-SCNC: 3.4 MMOL/L
BASE DEFICIT BLDV-SCNC: 3.7 MMOL/L
BASE DEFICIT BLDV-SCNC: 4.3 MMOL/L
BASE EXCESS BLDV CALC-SCNC: 0.2 MMOL/L
BASE EXCESS BLDV CALC-SCNC: 0.4 MMOL/L
BASE EXCESS BLDV CALC-SCNC: 0.8 MMOL/L
BLD GP AB SCN SERPL QL: NORMAL
BLOOD BANK CMNT PATIENT-IMP: NORMAL
BUN SERPL-MCNC: 59 MG/DL (ref 7–30)
BUN SERPL-MCNC: 60 MG/DL (ref 7–30)
BUN SERPL-MCNC: 61 MG/DL (ref 7–30)
BUN SERPL-MCNC: 63 MG/DL (ref 7–30)
CALCIUM SERPL-MCNC: 8 MG/DL (ref 8.5–10.1)
CALCIUM SERPL-MCNC: 8.2 MG/DL (ref 8.5–10.1)
CALCIUM SERPL-MCNC: 8.3 MG/DL (ref 8.5–10.1)
CALCIUM SERPL-MCNC: 8.5 MG/DL (ref 8.5–10.1)
CHLORIDE SERPL-SCNC: 100 MMOL/L (ref 94–109)
CHLORIDE SERPL-SCNC: 104 MMOL/L (ref 94–109)
CHLORIDE SERPL-SCNC: 98 MMOL/L (ref 94–109)
CHLORIDE SERPL-SCNC: 98 MMOL/L (ref 94–109)
CO2 SERPL-SCNC: 21 MMOL/L (ref 20–32)
CO2 SERPL-SCNC: 22 MMOL/L (ref 20–32)
CO2 SERPL-SCNC: 22 MMOL/L (ref 20–32)
CO2 SERPL-SCNC: 23 MMOL/L (ref 20–32)
COPATH REPORT: NORMAL
CREAT SERPL-MCNC: 1.54 MG/DL (ref 0.66–1.25)
CREAT SERPL-MCNC: 1.64 MG/DL (ref 0.66–1.25)
CREAT SERPL-MCNC: 1.65 MG/DL (ref 0.66–1.25)
CREAT SERPL-MCNC: 1.68 MG/DL (ref 0.66–1.25)
DONOR IDENTIFICATION: NORMAL
DSA COMMENTS: NORMAL
DSA PRESENT: NO
DSA TEST METHOD: NORMAL
ERYTHROCYTE [DISTWIDTH] IN BLOOD BY AUTOMATED COUNT: 14 % (ref 10–15)
ERYTHROCYTE [DISTWIDTH] IN BLOOD BY AUTOMATED COUNT: 14.1 % (ref 10–15)
ERYTHROCYTE [DISTWIDTH] IN BLOOD BY AUTOMATED COUNT: 14.2 % (ref 10–15)
ERYTHROCYTE [DISTWIDTH] IN BLOOD BY AUTOMATED COUNT: 14.3 % (ref 10–15)
GFR SERPL CREATININE-BSD FRML MDRD: 45 ML/MIN/{1.73_M2}
GFR SERPL CREATININE-BSD FRML MDRD: 46 ML/MIN/{1.73_M2}
GFR SERPL CREATININE-BSD FRML MDRD: 46 ML/MIN/{1.73_M2}
GFR SERPL CREATININE-BSD FRML MDRD: 50 ML/MIN/{1.73_M2}
GLUCOSE BLDC GLUCOMTR-MCNC: 113 MG/DL (ref 70–99)
GLUCOSE BLDC GLUCOMTR-MCNC: 117 MG/DL (ref 70–99)
GLUCOSE BLDC GLUCOMTR-MCNC: 118 MG/DL (ref 70–99)
GLUCOSE BLDC GLUCOMTR-MCNC: 131 MG/DL (ref 70–99)
GLUCOSE BLDC GLUCOMTR-MCNC: 149 MG/DL (ref 70–99)
GLUCOSE BLDC GLUCOMTR-MCNC: 154 MG/DL (ref 70–99)
GLUCOSE BLDC GLUCOMTR-MCNC: 190 MG/DL (ref 70–99)
GLUCOSE BLDC GLUCOMTR-MCNC: 221 MG/DL (ref 70–99)
GLUCOSE BLDC GLUCOMTR-MCNC: 231 MG/DL (ref 70–99)
GLUCOSE SERPL-MCNC: 120 MG/DL (ref 70–99)
GLUCOSE SERPL-MCNC: 147 MG/DL (ref 70–99)
GLUCOSE SERPL-MCNC: 189 MG/DL (ref 70–99)
GLUCOSE SERPL-MCNC: 262 MG/DL (ref 70–99)
HCO3 BLDV-SCNC: 20 MMOL/L (ref 21–28)
HCO3 BLDV-SCNC: 21 MMOL/L (ref 21–28)
HCO3 BLDV-SCNC: 21 MMOL/L (ref 21–28)
HCO3 BLDV-SCNC: 24 MMOL/L (ref 21–28)
HCO3 BLDV-SCNC: 25 MMOL/L (ref 21–28)
HCO3 BLDV-SCNC: 25 MMOL/L (ref 21–28)
HCO3 BLDV-SCNC: 26 MMOL/L (ref 21–28)
HCT VFR BLD AUTO: 27.1 % (ref 40–53)
HCT VFR BLD AUTO: 28.7 % (ref 40–53)
HCT VFR BLD AUTO: 28.9 % (ref 40–53)
HCT VFR BLD AUTO: 30.1 % (ref 40–53)
HGB BLD-MCNC: 8.6 G/DL (ref 13.3–17.7)
HGB BLD-MCNC: 9.2 G/DL (ref 13.3–17.7)
HGB BLD-MCNC: 9.4 G/DL (ref 13.3–17.7)
HGB BLD-MCNC: 9.6 G/DL (ref 13.3–17.7)
LMWH PPP CHRO-ACNC: 0.14 IU/ML
LMWH PPP CHRO-ACNC: <0.1 IU/ML
MAGNESIUM SERPL-MCNC: 2.5 MG/DL (ref 1.6–2.3)
MAGNESIUM SERPL-MCNC: 2.6 MG/DL (ref 1.6–2.3)
MCH RBC QN AUTO: 29.7 PG (ref 26.5–33)
MCH RBC QN AUTO: 29.8 PG (ref 26.5–33)
MCH RBC QN AUTO: 29.9 PG (ref 26.5–33)
MCH RBC QN AUTO: 30 PG (ref 26.5–33)
MCHC RBC AUTO-ENTMCNC: 31.7 G/DL (ref 31.5–36.5)
MCHC RBC AUTO-ENTMCNC: 31.8 G/DL (ref 31.5–36.5)
MCHC RBC AUTO-ENTMCNC: 31.9 G/DL (ref 31.5–36.5)
MCHC RBC AUTO-ENTMCNC: 32.8 G/DL (ref 31.5–36.5)
MCV RBC AUTO: 91 FL (ref 78–100)
MCV RBC AUTO: 94 FL (ref 78–100)
O2/TOTAL GAS SETTING VFR VENT: 21 %
O2/TOTAL GAS SETTING VFR VENT: ABNORMAL %
O2/TOTAL GAS SETTING VFR VENT: NORMAL %
ORGAN: NORMAL
OXYHGB MFR BLDV: 51 %
OXYHGB MFR BLDV: 51 %
OXYHGB MFR BLDV: 55 %
OXYHGB MFR BLDV: 58 %
OXYHGB MFR BLDV: 61 %
OXYHGB MFR BLDV: 61 %
OXYHGB MFR BLDV: 62 %
PCO2 BLDV: 33 MM HG (ref 40–50)
PCO2 BLDV: 35 MM HG (ref 40–50)
PCO2 BLDV: 36 MM HG (ref 40–50)
PCO2 BLDV: 38 MM HG (ref 40–50)
PCO2 BLDV: 40 MM HG (ref 40–50)
PCO2 BLDV: 41 MM HG (ref 40–50)
PCO2 BLDV: 44 MM HG (ref 40–50)
PH BLDV: 7.38 PH (ref 7.32–7.43)
PH BLDV: 7.38 PH (ref 7.32–7.43)
PH BLDV: 7.39 PH (ref 7.32–7.43)
PH BLDV: 7.4 PH (ref 7.32–7.43)
PH BLDV: 7.4 PH (ref 7.32–7.43)
PH BLDV: 7.41 PH (ref 7.32–7.43)
PH BLDV: 7.41 PH (ref 7.32–7.43)
PHOSPHATE SERPL-MCNC: 5 MG/DL (ref 2.5–4.5)
PLATELET # BLD AUTO: 145 10E9/L (ref 150–450)
PLATELET # BLD AUTO: 165 10E9/L (ref 150–450)
PLATELET # BLD AUTO: 166 10E9/L (ref 150–450)
PLATELET # BLD AUTO: 178 10E9/L (ref 150–450)
PO2 BLDV: 31 MM HG (ref 25–47)
PO2 BLDV: 31 MM HG (ref 25–47)
PO2 BLDV: 32 MM HG (ref 25–47)
PO2 BLDV: 34 MM HG (ref 25–47)
PO2 BLDV: 35 MM HG (ref 25–47)
PO2 BLDV: 36 MM HG (ref 25–47)
PO2 BLDV: 36 MM HG (ref 25–47)
POTASSIUM SERPL-SCNC: 3.4 MMOL/L (ref 3.4–5.3)
POTASSIUM SERPL-SCNC: 3.8 MMOL/L (ref 3.4–5.3)
POTASSIUM SERPL-SCNC: 3.8 MMOL/L (ref 3.4–5.3)
POTASSIUM SERPL-SCNC: 4 MMOL/L (ref 3.4–5.3)
RBC # BLD AUTO: 2.87 10E12/L (ref 4.4–5.9)
RBC # BLD AUTO: 3.09 10E12/L (ref 4.4–5.9)
RBC # BLD AUTO: 3.16 10E12/L (ref 4.4–5.9)
RBC # BLD AUTO: 3.21 10E12/L (ref 4.4–5.9)
SODIUM SERPL-SCNC: 132 MMOL/L (ref 133–144)
SODIUM SERPL-SCNC: 134 MMOL/L (ref 133–144)
SODIUM SERPL-SCNC: 135 MMOL/L (ref 133–144)
SODIUM SERPL-SCNC: 139 MMOL/L (ref 133–144)
SPECIMEN EXP DATE BLD: NORMAL
WBC # BLD AUTO: 13.7 10E9/L (ref 4–11)
WBC # BLD AUTO: 15.2 10E9/L (ref 4–11)
WBC # BLD AUTO: 15.8 10E9/L (ref 4–11)
WBC # BLD AUTO: 16.4 10E9/L (ref 4–11)

## 2019-02-27 PROCEDURE — 40000275 ZZH STATISTIC RCP TIME EA 10 MIN

## 2019-02-27 PROCEDURE — 86850 RBC ANTIBODY SCREEN: CPT | Performed by: INTERNAL MEDICINE

## 2019-02-27 PROCEDURE — 25000132 ZZH RX MED GY IP 250 OP 250 PS 637: Performed by: STUDENT IN AN ORGANIZED HEALTH CARE EDUCATION/TRAINING PROGRAM

## 2019-02-27 PROCEDURE — 84100 ASSAY OF PHOSPHORUS: CPT | Performed by: PHYSICIAN ASSISTANT

## 2019-02-27 PROCEDURE — 25000132 ZZH RX MED GY IP 250 OP 250 PS 637: Performed by: ANESTHESIOLOGY

## 2019-02-27 PROCEDURE — 85027 COMPLETE CBC AUTOMATED: CPT | Performed by: STUDENT IN AN ORGANIZED HEALTH CARE EDUCATION/TRAINING PROGRAM

## 2019-02-27 PROCEDURE — 99233 SBSQ HOSP IP/OBS HIGH 50: CPT | Mod: GC | Performed by: INTERNAL MEDICINE

## 2019-02-27 PROCEDURE — 85027 COMPLETE CBC AUTOMATED: CPT | Performed by: PHYSICIAN ASSISTANT

## 2019-02-27 PROCEDURE — 97530 THERAPEUTIC ACTIVITIES: CPT | Mod: GP | Performed by: REHABILITATION PRACTITIONER

## 2019-02-27 PROCEDURE — 92526 ORAL FUNCTION THERAPY: CPT | Mod: GN

## 2019-02-27 PROCEDURE — 80048 BASIC METABOLIC PNL TOTAL CA: CPT | Performed by: STUDENT IN AN ORGANIZED HEALTH CARE EDUCATION/TRAINING PROGRAM

## 2019-02-27 PROCEDURE — 25000132 ZZH RX MED GY IP 250 OP 250 PS 637: Performed by: NURSE PRACTITIONER

## 2019-02-27 PROCEDURE — 97116 GAIT TRAINING THERAPY: CPT | Mod: GP | Performed by: REHABILITATION PRACTITIONER

## 2019-02-27 PROCEDURE — 25000128 H RX IP 250 OP 636: Performed by: STUDENT IN AN ORGANIZED HEALTH CARE EDUCATION/TRAINING PROGRAM

## 2019-02-27 PROCEDURE — 00000146 ZZHCL STATISTIC GLUCOSE BY METER IP

## 2019-02-27 PROCEDURE — 20000004 ZZH R&B ICU UMMC

## 2019-02-27 PROCEDURE — 86901 BLOOD TYPING SEROLOGIC RH(D): CPT | Performed by: INTERNAL MEDICINE

## 2019-02-27 PROCEDURE — 85520 HEPARIN ASSAY: CPT | Performed by: STUDENT IN AN ORGANIZED HEALTH CARE EDUCATION/TRAINING PROGRAM

## 2019-02-27 PROCEDURE — C9113 INJ PANTOPRAZOLE SODIUM, VIA: HCPCS | Performed by: NURSE PRACTITIONER

## 2019-02-27 PROCEDURE — 97110 THERAPEUTIC EXERCISES: CPT | Mod: GP | Performed by: REHABILITATION PRACTITIONER

## 2019-02-27 PROCEDURE — 85730 THROMBOPLASTIN TIME PARTIAL: CPT | Performed by: PHYSICIAN ASSISTANT

## 2019-02-27 PROCEDURE — 40000196 ZZH STATISTIC RAPCV CVP MONITORING

## 2019-02-27 PROCEDURE — 40000048 ZZH STATISTIC DAILY SWAN MONITORING

## 2019-02-27 PROCEDURE — 71045 X-RAY EXAM CHEST 1 VIEW: CPT

## 2019-02-27 PROCEDURE — 25000128 H RX IP 250 OP 636: Performed by: NURSE PRACTITIONER

## 2019-02-27 PROCEDURE — 83735 ASSAY OF MAGNESIUM: CPT | Performed by: PHYSICIAN ASSISTANT

## 2019-02-27 PROCEDURE — 25000131 ZZH RX MED GY IP 250 OP 636 PS 637: Performed by: STUDENT IN AN ORGANIZED HEALTH CARE EDUCATION/TRAINING PROGRAM

## 2019-02-27 PROCEDURE — 82805 BLOOD GASES W/O2 SATURATION: CPT | Performed by: PHYSICIAN ASSISTANT

## 2019-02-27 PROCEDURE — 25000128 H RX IP 250 OP 636: Performed by: INTERNAL MEDICINE

## 2019-02-27 PROCEDURE — 86900 BLOOD TYPING SEROLOGIC ABO: CPT | Performed by: INTERNAL MEDICINE

## 2019-02-27 PROCEDURE — 25000125 ZZHC RX 250: Performed by: INTERNAL MEDICINE

## 2019-02-27 PROCEDURE — 25000132 ZZH RX MED GY IP 250 OP 250 PS 637: Performed by: INTERNAL MEDICINE

## 2019-02-27 PROCEDURE — 25800030 ZZH RX IP 258 OP 636: Performed by: STUDENT IN AN ORGANIZED HEALTH CARE EDUCATION/TRAINING PROGRAM

## 2019-02-27 PROCEDURE — 80048 BASIC METABOLIC PNL TOTAL CA: CPT | Performed by: PHYSICIAN ASSISTANT

## 2019-02-27 PROCEDURE — 25000131 ZZH RX MED GY IP 250 OP 636 PS 637: Performed by: INTERNAL MEDICINE

## 2019-02-27 PROCEDURE — 25000125 ZZHC RX 250: Performed by: STUDENT IN AN ORGANIZED HEALTH CARE EDUCATION/TRAINING PROGRAM

## 2019-02-27 PROCEDURE — 40000014 ZZH STATISTIC ARTERIAL MONITORING DAILY

## 2019-02-27 RX ORDER — PREDNISONE 10 MG/1
20 TABLET ORAL 2 TIMES DAILY WITH MEALS
Status: DISCONTINUED | OUTPATIENT
Start: 2019-03-01 | End: 2019-03-09

## 2019-02-27 RX ORDER — HYDRALAZINE HYDROCHLORIDE 25 MG/1
50 TABLET, FILM COATED ORAL EVERY 8 HOURS SCHEDULED
Status: DISCONTINUED | OUTPATIENT
Start: 2019-02-27 | End: 2019-02-28

## 2019-02-27 RX ORDER — TERBUTALINE SULFATE 5 MG/1
10 TABLET ORAL EVERY 8 HOURS
Status: DISCONTINUED | OUTPATIENT
Start: 2019-02-27 | End: 2019-03-14

## 2019-02-27 RX ORDER — NICOTINE POLACRILEX 4 MG
15-30 LOZENGE BUCCAL
Status: DISCONTINUED | OUTPATIENT
Start: 2019-02-27 | End: 2019-03-15

## 2019-02-27 RX ORDER — MYCOPHENOLATE MOFETIL 500 MG/1
1500 TABLET ORAL
Status: DISCONTINUED | OUTPATIENT
Start: 2019-02-27 | End: 2019-03-09

## 2019-02-27 RX ORDER — NYSTATIN 100000/ML
500000 SUSPENSION, ORAL (FINAL DOSE FORM) ORAL 4 TIMES DAILY
Status: DISCONTINUED | OUTPATIENT
Start: 2019-02-27 | End: 2019-04-03 | Stop reason: HOSPADM

## 2019-02-27 RX ORDER — DEXTROSE MONOHYDRATE 25 G/50ML
25-50 INJECTION, SOLUTION INTRAVENOUS
Status: DISCONTINUED | OUTPATIENT
Start: 2019-02-27 | End: 2019-03-15

## 2019-02-27 RX ORDER — HEPARIN SODIUM 10000 [USP'U]/100ML
0-3500 INJECTION, SOLUTION INTRAVENOUS CONTINUOUS
Status: DISCONTINUED | OUTPATIENT
Start: 2019-02-27 | End: 2019-03-08

## 2019-02-27 RX ADMIN — TACROLIMUS 0.5 MG: 0.5 CAPSULE ORAL at 07:53

## 2019-02-27 RX ADMIN — ACETAMINOPHEN 650 MG: 325 TABLET, FILM COATED ORAL at 20:43

## 2019-02-27 RX ADMIN — POTASSIUM CHLORIDE 20 MEQ: 400 INJECTION, SOLUTION INTRAVENOUS at 20:43

## 2019-02-27 RX ADMIN — MYCOPHENOLATE MOFETIL 1500 MG: 500 TABLET ORAL at 09:35

## 2019-02-27 RX ADMIN — PIPERACILLIN AND TAZOBACTAM 3.38 G: 3; .375 INJECTION, POWDER, FOR SOLUTION INTRAVENOUS at 01:43

## 2019-02-27 RX ADMIN — PIPERACILLIN AND TAZOBACTAM 3.38 G: 3; .375 INJECTION, POWDER, FOR SOLUTION INTRAVENOUS at 07:53

## 2019-02-27 RX ADMIN — MYCOPHENOLATE MOFETIL 1500 MG: 500 TABLET ORAL at 18:15

## 2019-02-27 RX ADMIN — Medication 500000 UNITS: at 22:22

## 2019-02-27 RX ADMIN — DOCUSATE SODIUM 286 ML: 50 LIQUID ORAL at 12:15

## 2019-02-27 RX ADMIN — TACROLIMUS 0.5 MG: 0.5 CAPSULE ORAL at 18:15

## 2019-02-27 RX ADMIN — BISACODYL 10 MG: 10 SUPPOSITORY RECTAL at 07:53

## 2019-02-27 RX ADMIN — PANTOPRAZOLE SODIUM 40 MG: 40 INJECTION, POWDER, FOR SOLUTION INTRAVENOUS at 07:53

## 2019-02-27 RX ADMIN — INSULIN ASPART 1 UNITS: 100 INJECTION, SOLUTION INTRAVENOUS; SUBCUTANEOUS at 12:13

## 2019-02-27 RX ADMIN — HYDRALAZINE HYDROCHLORIDE 50 MG: 25 TABLET ORAL at 22:20

## 2019-02-27 RX ADMIN — HEPARIN SODIUM 650 UNITS/HR: 10000 INJECTION, SOLUTION INTRAVENOUS at 13:28

## 2019-02-27 RX ADMIN — HYDRALAZINE HYDROCHLORIDE 25 MG: 25 TABLET ORAL at 06:44

## 2019-02-27 RX ADMIN — Medication 500000 UNITS: at 18:15

## 2019-02-27 RX ADMIN — POLYETHYLENE GLYCOL 3350 17 G: 17 POWDER, FOR SOLUTION ORAL at 08:30

## 2019-02-27 RX ADMIN — PIPERACILLIN AND TAZOBACTAM 3.38 G: 3; .375 INJECTION, POWDER, FOR SOLUTION INTRAVENOUS at 13:31

## 2019-02-27 RX ADMIN — Medication 500000 UNITS: at 12:10

## 2019-02-27 RX ADMIN — MAGNESIUM HYDROXIDE 30 ML: 400 SUSPENSION ORAL at 21:39

## 2019-02-27 RX ADMIN — OXYCODONE HYDROCHLORIDE 5 MG: 5 TABLET ORAL at 22:20

## 2019-02-27 RX ADMIN — DULOXETINE 20 MG: 20 CAPSULE, DELAYED RELEASE ORAL at 07:53

## 2019-02-27 RX ADMIN — METHYLPREDNISOLONE SODIUM SUCCINATE 20 MG: 40 INJECTION, POWDER, LYOPHILIZED, FOR SOLUTION INTRAMUSCULAR; INTRAVENOUS at 06:44

## 2019-02-27 RX ADMIN — FLUTICASONE PROPIONATE 2 PUFF: 220 AEROSOL, METERED RESPIRATORY (INHALATION) at 20:43

## 2019-02-27 RX ADMIN — Medication 500000 UNITS: at 10:18

## 2019-02-27 RX ADMIN — HYDRALAZINE HYDROCHLORIDE 50 MG: 25 TABLET ORAL at 13:31

## 2019-02-27 RX ADMIN — VANCOMYCIN HYDROCHLORIDE 1000 MG: 1 INJECTION, SOLUTION INTRAVENOUS at 08:30

## 2019-02-27 RX ADMIN — SENNOSIDES AND DOCUSATE SODIUM 2 TABLET: 8.6; 5 TABLET ORAL at 07:53

## 2019-02-27 RX ADMIN — PREDNISONE 25 MG: 5 TABLET ORAL at 18:15

## 2019-02-27 RX ADMIN — FLUTICASONE PROPIONATE 2 PUFF: 220 AEROSOL, METERED RESPIRATORY (INHALATION) at 09:00

## 2019-02-27 RX ADMIN — SENNOSIDES AND DOCUSATE SODIUM 2 TABLET: 8.6; 5 TABLET ORAL at 20:42

## 2019-02-27 RX ADMIN — TERBUTALINE SULFATE 10 MG: 5 TABLET ORAL at 10:18

## 2019-02-27 RX ADMIN — OXYCODONE HYDROCHLORIDE 5 MG: 5 TABLET ORAL at 16:00

## 2019-02-27 RX ADMIN — MESALAMINE 2400 MG: 800 TABLET, DELAYED RELEASE ORAL at 20:43

## 2019-02-27 RX ADMIN — MESALAMINE 2400 MG: 800 TABLET, DELAYED RELEASE ORAL at 07:56

## 2019-02-27 RX ADMIN — LIDOCAINE 1 PATCH: 560 PATCH PERCUTANEOUS; TOPICAL; TRANSDERMAL at 08:55

## 2019-02-27 RX ADMIN — LEVOTHYROXINE SODIUM 100 MCG: 100 TABLET ORAL at 07:53

## 2019-02-27 RX ADMIN — TERBUTALINE SULFATE 10 MG: 5 TABLET ORAL at 16:34

## 2019-02-27 RX ADMIN — INSULIN ASPART 2 UNITS: 100 INJECTION, SOLUTION INTRAVENOUS; SUBCUTANEOUS at 10:20

## 2019-02-27 RX ADMIN — ISOPROTERENOL HYDROCHLORIDE 0.03 MCG/KG/MIN: 0.2 INJECTION, SOLUTION INTRAMUSCULAR; INTRAVENOUS at 04:38

## 2019-02-27 RX ADMIN — ALUMINUM HYDROXIDE, MAGNESIUM HYDROXIDE, AND DIMETHICONE 30 ML: 400; 400; 40 SUSPENSION ORAL at 08:30

## 2019-02-27 RX ADMIN — MONTELUKAST SODIUM 10 MG: 10 TABLET, COATED ORAL at 22:20

## 2019-02-27 RX ADMIN — PIPERACILLIN AND TAZOBACTAM 3.38 G: 3; .375 INJECTION, POWDER, FOR SOLUTION INTRAVENOUS at 19:54

## 2019-02-27 RX ADMIN — INSULIN ASPART 1 UNITS: 100 INJECTION, SOLUTION INTRAVENOUS; SUBCUTANEOUS at 16:15

## 2019-02-27 ASSESSMENT — ACTIVITIES OF DAILY LIVING (ADL)
ADLS_ACUITY_SCORE: 11
ADLS_ACUITY_SCORE: 12
ADLS_ACUITY_SCORE: 11
ADLS_ACUITY_SCORE: 11

## 2019-02-27 ASSESSMENT — MIFFLIN-ST. JEOR: SCORE: 1408.5

## 2019-02-27 NOTE — PLAN OF CARE
POD 3 OHT. Uneventful night. CI improved after nipride started. Pt is alert and appropriate. Declined pain meds. Afebrile. Sinus rhythm. CVP 14. PA 36/16. CI 2.9. SvO2 61 with nipride at 1m/k/m. 2lpm O2 NC. Lungs coarse, but good cough. No BM, Flatus+. Adequate UOP. CT with minimal output.     Black Snyder RN 02/27/19 5:25 AM    Hours of cares [phone]

## 2019-02-27 NOTE — PLAN OF CARE
Discharge Planner SLP   Patient plan for discharge: none stated  Current status: SLP: Pt with improved PO tolerance today. Recommend advance to regular textures and thin liquids with intermittent supervision. Caregivers to encourage upright positioning for all PO intake, small single sips/bites, slow pacing, and alternating between consistencies.   Barriers to return to prior living situation: dysphagia, waxing/waning confusion   Recommendations for discharge: ARU  Rationale for recommendations: pt would benefit from continued ST to train strategies to minimize aspiration risk        Entered by: Monique Christy 02/27/2019 8:13 AM

## 2019-02-27 NOTE — PROGRESS NOTES
ADVANCED HEART FAILURE PROGRESS NOTE    SUBJECTIVE:  No acute overnight events. Feeling fairly well this morning, some chest discomfort.    ROS otherwise negative.    OBJECTIVE:  Vital signs:  Temp: 98.6  F (37  C) Temp  Min: 95.9  F (35.5  C)  Max: 100  F (37.8  C)  Heart Rate: 90 Heart Rate  Min: 90  Max: 101    No data recorded.  No data recorded.    Resp: 16 Resp  Min: 16  Max: 20  SpO2: 98 % SpO2  Min: 90 %  Max: 100 %      [place]: RA 14, PA 34/16, PA sat 61%, CO/CI 4.8/2.9, SVR 1017      Intake/Output Summary (Last 24 hours) at 2/27/2019 0635  Last data filed at 2/27/2019 0400  Gross per 24 hour   Intake 2278.69 ml   Output 1925 ml   Net 353.69 ml       Vitals:    02/24/19 0400 02/25/19 0600 02/26/19 0400   Weight: 54.7 kg (120 lb 8 oz) 58 kg (127 lb 13.9 oz) 57.7 kg (127 lb 3.3 oz)       Physical Exam:  Gen: awake, alert, appears comfortable  HEENT: PERRL, moist mucosa  Resp: clear bilaterally, no rales or wheezes  CVS: tachycardic, regular, no murmurs, slight rub audible  Abdo: soft, nondistended, mild tenderness on palpation  Extremities: warm, no edema, normal pulses  Neuro:awake, alert      Medications:    IV fluid REPLACEMENT ONLY       EPINEPHrine IV infusion ADULT 0.02 mcg/kg/min (02/27/19 0400)     heparin 500 Units/hr (02/27/19 0400)     insulin (regular) 0.5 Units/hr (02/27/19 0400)     isoproterenol (ISUPREL) infusion ADULT 0.03 mcg/kg/min (02/27/19 0438)     - MEDICATION INSTRUCTIONS -       nitroPRUsside (NIPRIDE) IV infusion ADULT/PEDS GREATER than or EQUAL to 45 kg std conc 1 mcg/kg/min (02/27/19 0400)     - MEDICATION INSTRUCTIONS -       BETA BLOCKER NOT PRESCRIBED         Current Facility-Administered Medications   Medication Dose Route Frequency     bisacodyl  10 mg Rectal Daily     DULoxetine  20 mg Oral Daily     fluticasone  2 puff Inhalation Q12H     heparin lock flush  5 mL Intravenous Q24H     hydrALAZINE  25 mg Oral Q8H SHERRIE     levothyroxine  100 mcg Oral Daily     lidocaine  1 patch  Transdermal Q24H     lidocaine   Transdermal Q8H     lidocaine   Transdermal Q24h     mesalamine  2,400 mg Oral BID     methylPREDNISolone  20 mg Intravenous Q12H    Followed by     [START ON 3/1/2019] methylPREDNISolone  16 mg Intravenous Q12H     montelukast  10 mg Oral At Bedtime     mycophenolate mofetil  1,500 mg Intravenous BID     pantoprazole (PROTONIX) IV  40 mg Intravenous Daily with breakfast     piperacillin-tazobactam  3.375 g Intravenous Q6H     polyethylene glycol  17 g Oral Daily     senna-docusate  1 tablet Oral BID    Or     senna-docusate  2 tablet Oral BID     sodium chloride (PF)  3 mL Intracatheter Q8H     tacrolimus  0.5 mg Oral BID IS     vancomycin (VANCOCIN) IV  1,000 mg Intravenous Q24H         Labs:  CMP  Recent Labs   Lab 02/27/19  0421 02/26/19  2242 02/26/19  1533  02/26/19  0333  02/25/19  1502  02/24/19  1817    139 144   < > 145*   < > 142   < > 144   POTASSIUM 4.0 4.3 4.8   < > 4.4   < > 4.5   < > 3.7   CHLORIDE 104 104 107   < > 110*   < > 107   < > 106   CO2 23 22 26   < > 23   < > 24   < > 23   BUN 59* 58* 56*   < > 45*   < > 38*   < > 27   CR 1.68* 1.58* 1.58*   < > 1.58*   < > 1.48*   < > 1.06   RADAMES 8.3* 8.3* 8.8   < > 8.3*   < > 8.9   < > 8.4*   MAG 2.5*  --  2.7*  --  2.6*   < > 3.0*   < > 3.0*   PHOS 5.0*  --   --   --  7.4*  --  6.8*  --  2.7   * 198* 148*   < > 118*   < > 120*   < > 130*   LDH  --   --   --   --   --   --   --   --  536*   ALKPHOS  --   --   --   --   --   --  89  --  96   BILITOTAL  --   --   --   --   --   --  1.4*  --  2.7*   AST  --   --   --   --   --   --  110*  --  92*   ALT  --   --   --   --   --   --  28  --  24    < > = values in this interval not displayed.     BLOOD GAS  Recent Labs   Lab 02/25/19  1717 02/25/19  1502   PH 7.32* 7.31*   PCO2 36 51*   PO2 110* 75*     CBC  Recent Labs   Lab 02/27/19 0421 02/26/19  2242   WBC 15.2* 17.8*   HGB 8.6* 9.2*   HCT 27.1* 28.8*   * 162     COAG  Recent Labs   Lab 02/27/19 0421  02/25/19  1502 02/24/19  1817   INR  --  1.29* 1.50*   PTT 38* 32 55*         ASSESSMENT/PLAN:  Everton Larios is a 54 yo gentleman with a h/o NICM, HFrEF (EF 15%) s/p ICD placement (2008, generator change 2018), congenital ASD repair in childhood, paroxysmal atrial fibrillation s/p ablation and cardioversion, hypothyroidism, EtOH abuse, ulcerative colitis, remote history of GI bleed with splenic embolization, and a recent admission in 11/2018 for ambulatory cardiogenic shock requiring initiation of IV inotropes, who was admitted for heart transplant, now status 2 with a L subclavian balloon pump.     #S/p OHT on 2/24:  Graft function:  - BP stable  - continue epi 0.02 for support  - continue isuprel for HR support, starting terbutaline 10 q8h  - post op antibiotic coverage with vanc/zosyn  - will continue diuresis prn  - on nipride, increasing hydralazine to 50mg q8h     Immunosuppression:  - on MMF 1500 BID  - s/p methylpred 125 x 3 doses, prednisone taper  - NOT Simulect protocol, on tacro 0.5 BID, check level tomorrow     Prophylaxis:  - CMV +/+: medium risk, holding Valcyte for now  - PCP: holding Bactrim for now  - Thrush: start nystatin  - GI: on PPI  - CAV: holding ASA and statin for now     Biopsies:  - First biopsy on approximately 3/4     Goretex conduit for persistent left SVC:  - will need anticoagulation, advance heparin to low intensity        # Hypothyroidism: continue PTA levothyroxine  # Ulcerative colitis: currently in remission, continue PTA mesalamine  # GERD: continue PTA omeprazole  # BPH: continue PTA tamsulosin  # Depression: continue PTA duloxetine  # H/o EtOH abuse: currently denies any EtOH since 4 months ago        Seen and staffed with Dr. Jose Alfredo Young.      Trev Harris MD  Advanced Heart Failure Fellow  [phone]

## 2019-02-27 NOTE — PROGRESS NOTES
CV ICU PROGRESS NOTE  February 27, 2019      CO-MORBIDITIES:   Chronic systolic heart failure (H)  (primary encounter diagnosis)  Acute on chronic systolic congestive heart failure (H)    ASSESSMENT: Everton Larios is a 53 year old male with PMH etoh abuse, hypothyroidism, intermittent asthma, ulcerative colitis, Depression, Chronic HFrEF, NICM admitted with cardiogenic shock requiring subclavian balloon pump now s/p OHT 2/24/19 with Piyush House and Christopher.     TODAY'S PROGRESS:   - Discontinue epi gtt  - Low intensity heparin gtt   - Increase Hydralazine 50mg q8h  - Terbutaline 10mg q8h  - Wean isopril     PLAN:  Neuro/ pain/ sedation:  - Monitor neurological status. Notify the MD for any acute changes in exam.  - Tylenol, oxycodone and PRN fentanyl     #Depression  PTA cymbalta 20mg     Pulmonary care:   - 2l NC  - Supp oxygen to maintain SpO2 >92%    #Intermittent asthma  - PTA Montelukast ordered    Cardiovascular:  HR goal >95  - Monitor hemodynamic status.   - MAP >65. HR goal > 95.  - Wean Isopreterenol gtt  - Wean epi gtt  - Holding asa and statin for now  - Low intensity heparin gtt   - Increase Hydralazine 50mg q8h  - Terbutaline 10mg q8h   - Holding asa and statin for now    GI care:   - Speech evaluated- advance to full liquid diet.  #constipation  - Senna, miralax and suppository for bowel regimen; enema if no result    #Intraperitoneal free air  - Small peritoneal defect made during redo operation, closed with stitches intra-op- likely source of free air. No peritoneal signs. Pain improving 2/27/2019     #Pierce's esophagus  #Ulcerative colitis  -PTA Mesalamine     Fluids/ Electrolytes/ Nutrition:   - TKO for IV fluid hydration  - No indication for parenteral nutrition.    Renal/ Fluid Balance:    - Urine output is adequate so far.  - Will continue to monitor intake and output.  - Diuresis as needed (received 60 mg lasix overnight/this am)  - PTA Tamsulosin      Endocrine:    # Stress  hyperglycemia  - Insulin gtt  # hypothyroidism  - Home levothyroxine      ID/ Antibiotics:  - Perioperative antibiotics.   - Cefazolin to vancomycin and zosyn x 3 days.  - Holding CMV/PCP prophylaxis for now.   - Nystatin for thrush     Heme:     #Bypass of persistent left SVC to R atrial appendage with [place]-Davis graft. Will require lifelong anti-coagulation for graft  - Hgb goal >7. Daily CBC.  - No active concern for bleeding.  - Low intensity heparin today     Prophylaxis:    - Mechanical prophylaxis for DVT  - Heparin as above  - Protonix qd     Lines/ tubes/ drains:  - RIJ, [place], Arterial line, chest tube, maddox     Disposition:  - CV ICU.     Patient seen, findings and plan discussed with CV ICU staff, Dr. Mederos.    Nicolas Paredes MD  CA-3/PGY-4 Anesthesiology  [phone]  ====================================    SUBJECTIVE:   Abdominal pain improved today    OBJECTIVE:   1. VITAL SIGNS:   Temp:  [95.9  F (35.5  C)-100  F (37.8  C)] 97.6  F (36.4  C)  Heart Rate:  [] 92  Resp:  [16-20] 20  MAP:  [67 mmHg-98 mmHg] 69 mmHg  Arterial Line BP: ()/(8-78) 105/53  SpO2:  [90 %-100 %] 97 %  Resp: 20      2. INTAKE/ OUTPUT:   I/O last 3 completed shifts:  In: 2336.49 [P.O.:480; I.V.:1856.49]  Out: 2075 [Urine:1710; Chest Tube:365]    3. PHYSICAL EXAMINATION:     General: seated in chair, very pleasant, appropriately interactive   Neuro: Alert and   Resp: CTAB on 4L oxygen  CV: Regular rate, regular rhythm, serosanguinous output from chest tubes  Abdomen: Soft, Non-distended, minimal tenderness upper > lower, improved today  Incisions: c/d/i  Extremities: warm and well perfused    4. INVESTIGATIONS:   Arterial Blood Gases   Recent Labs   Lab 02/25/19  1717 02/25/19  1502 02/25/19  0918 02/25/19  0350   PH 7.32* 7.31* 7.33* 7.39   PCO2 36 51* 46* 42   PO2 110* 75* 125* 87   HCO3 19* 26 24 25     Complete Blood Count   Recent Labs   Lab 02/27/19  0421 02/26/19  2242 02/26/19  1533 02/26/19  1006   WBC  15.2* 17.8* 20.7* 19.4*   HGB 8.6* 9.2* 10.7* 10.2*   * 162 170 138*     Basic Metabolic Panel  Recent Labs   Lab 02/27/19  0421 02/26/19  2242 02/26/19  1533 02/26/19  1006    139 144 144   POTASSIUM 4.0 4.3 4.8 3.9   CHLORIDE 104 104 107 106   CO2 23 22 26 25   BUN 59* 58* 56* 50*   CR 1.68* 1.58* 1.58* 1.56*   * 198* 148* 205*     Liver Function Tests  Recent Labs   Lab 02/25/19  1502 02/24/19  1817 02/24/19  1556 02/24/19  0340 02/23/19  1835   * 92*  --   --  21   ALT 28 24  --   --  15   ALKPHOS 89 96  --   --  169*   BILITOTAL 1.4* 2.7*  --   --  1.6*   ALBUMIN 3.4 2.4*  --   --  3.4   INR 1.29* 1.50* 1.57* 1.19* 1.20*     Pancreatic Enzymes  Recent Labs   Lab 02/23/19  1835   AMYLASE 22*     Coagulation Profile  Recent Labs   Lab 02/27/19  0421 02/25/19  1502 02/24/19  1817 02/24/19  1556 02/24/19  0340   INR  --  1.29* 1.50* 1.57* 1.19*   PTT 38* 32 55* 38*  --          5. RADIOLOGY:   Recent Results (from the past 24 hour(s))   CT Abdomen Pelvis w/o Contrast   Result Value    Radiologist flags Small retroperitoneal hemorrhage (Urgent)    Narrative    EXAMINATION: CT ABDOMEN PELVIS W/O CONTRAST, 2/26/2019 9:27 AM.    TECHNIQUE: Helical CT images from the lung bases through the symphysis  pubis were obtained without IV contrast.     COMPARISON: 2/26/2019, 2/18/2019.    HISTORY: Abd distension. Free air above liver, abdominal pain, post  heart transplant.    FINDINGS:    Abdomen and pelvis:   Subcentimeter hypodensities in the liver are too small to  characterize; other hepatic hypodensities are consistent with simple  hepatic cysts. Hyperdense fluid within the gallbladder, likely  representing vicarious excretion of contrast. The spleen, adrenal  glands, and pancreas demonstrate normal noncontrast appearance. Focal  cortical scarring in the mid left kidney. Mild right pelvocaliectasis.  A Paul catheter is positioned within the urinary bladder. A moderate  volume of antidependent  air within the urinary bladder.    Small to moderate volume of pneumoperitoneum. No intra-abdominal free  fluid. Large right colonic stool burden. The appendix is not well  visualized. Normal caliber abdominal aorta. No lymphadenopathy in the  abdomen or pelvis. Small hematoma superficial to the distal right  external iliac vessels and extending along the right lateral paracolic  gutter.    Lower chest:   The heart is mildly enlarged. Partially imaged pacemaker lead is seen  in the right ventricle. Epicardial pacing wires are in appropriate  position. Appropriately positioned pericardial drain. Partially imaged  bypass from left superior vena cava to the right atrial appendage. No  significant pericardial effusion or hemopericardium. Trace  pneumothoraces bilaterally. Bibasilar atelectasis, left greater than  right. Partially imaged the sternal drains and chest tubes, with the  right chest tube tip positioned deeply within the lateral right  costophrenic angle however no diaphragmatic discontinuity is seen or  suspected.    Bones and soft tissues:   Subcutaneous lipomas in the right flank. Mild subcutaneous edema  throughout. No acute or worrisome osseous lesions.        Impression    IMPRESSION:   1. Postsurgical changes of cardiac transplantation with trace  pneumothoraces bilaterally and mild bibasilar atelectasis.  2. Postoperative small to moderate pneumoperitoneum without  intra-abdominal free fluid to suggest bowel injury and no  diaphragmatic injury visualized.  3. Small retroperitoneal hematoma extending superiorly from anterior  to the right external iliac vessels.      [Access Center: Small retroperitoneal hemorrhage]    This report will be copied to the [facility] to ensure a  provider acknowledges the finding. Access Center is available Monday  through Friday 8am-3:30 pm.     I have personally reviewed the examination and initial interpretation  and I agree with the findings.    SOCORRO CALVILLO  DOMINGA, MD       =========================================

## 2019-02-27 NOTE — PLAN OF CARE
Discharge Planner PT   Patient plan for discharge: agrees with rec.  Current status: Ambulates 3ft forward and back with CGAx1, no AD.  Gait limited by lines and incisional pain.   STS x3 with brief min A, mostly CGA, no AD. Balance challenged by standing LE ex.  Barriers to return to prior living situation: weakness, pain, A with mobility  Recommendations for discharge: currently would need rehab but if doing well with transfers, gait and stairs, then anticipate home with A from parents and OP PT.   Rationale for recommendations: Pt currently below baseline, limited by pain and lines primarily.  With progress with PT and stairs, pt likely safe to discharge home.

## 2019-02-27 NOTE — PROGRESS NOTES
CVTS PROGRESS NOTE  February 27, 2019      CO-MORBIDITIES:   Chronic systolic heart failure (H)  (primary encounter diagnosis)  Acute on chronic systolic congestive heart failure (H)    ASSESSMENT: Everton Larios is a 53 year old male with PMH etoh abuse, hypothyroidism, intermittent asthma, ulcerative colitis, Depression, Chronic HFrEF, NICM admitted with cardiogenic shock requiring subclavian balloon pump now s/p OHT 2/24/19 with Piyush House and Christopher.     TODAY'S PROGRESS:   - Discontinue epi gtt  - Low intensity heparin gtt   - Increase Hydralazine 50mg q8h  - Terbutaline 10mg q8h  - Wean isopril     PLAN:  Neuro/ pain/ sedation:  - Monitor neurological status. Notify the MD for any acute changes in exam.  - Tylenol, oxycodone and PRN fentanyl     #Depression  PTA cymbalta 20mg     Pulmonary care:   - 2l NC  - Supp oxygen to maintain SpO2 >92%    #Intermittent asthma  - PTA Montelukast ordered    Cardiovascular:  HR goal >95  - Monitor hemodynamic status.   - MAP >65. HR goal > 95.  - Wean Isopreterenol gtt  - Wean epi gtt  - Holding asa and statin for now  - Low intensity heparin gtt   - Increase Hydralazine 50mg q8h  - Terbutaline 10mg q8h   - Holding asa and statin for now    GI care:   - Speech evaluated- advance to full liquid diet.  #constipation  - Senna, miralax and suppository for bowel regimen; enema if no result    #Intraperitoneal free air  - Small peritoneal defect made during redo operation, closed with stitches intra-op- likely source of free air. No peritoneal signs. Pain improving 2/27/2019     #Pierce's esophagus  #Ulcerative colitis  -PTA Mesalamine     Fluids/ Electrolytes/ Nutrition:   - TKO for IV fluid hydration  - No indication for parenteral nutrition.    Renal/ Fluid Balance:    - Urine output is adequate so far.  - Will continue to monitor intake and output.  - Diuresis as needed (received 60 mg lasix overnight/this am)  - PTA Tamsulosin      Endocrine:    # Stress  hyperglycemia  - Insulin gtt  # hypothyroidism  - Home levothyroxine      ID/ Antibiotics:  - Perioperative antibiotics.   - Cefazolin to vancomycin and zosyn x 3 days.  - Holding CMV/PCP prophylaxis for now.   - Nystatin for thrush     Heme:     #Bypass of persistent left SVC to R atrial appendage with [place]-Davis graft. Will require lifelong anti-coagulation for graft  - Hgb goal >7. Daily CBC.  - No active concern for bleeding.  - Low intensity heparin today     Prophylaxis:    - Mechanical prophylaxis for DVT  - Heparin as above  - Protonix qd     Lines/ tubes/ drains:  - RIJ, [place], Arterial line, chest tube, maddox     Disposition:  - CV ICU.     Patient seen, findings and plan discussed with CVTS Fellow    Nicolas Paredes MD  CA-3/PGY-4 Anesthesiology  [phone]  ====================================    SUBJECTIVE:   Abdominal pain improved today    OBJECTIVE:   1. VITAL SIGNS:   Temp:  [95.9  F (35.5  C)-100  F (37.8  C)] 97.6  F (36.4  C)  Heart Rate:  [] 92  Resp:  [16-20] 20  MAP:  [67 mmHg-98 mmHg] 69 mmHg  Arterial Line BP: ()/(8-78) 105/53  SpO2:  [90 %-100 %] 97 %  Resp: 20      2. INTAKE/ OUTPUT:   I/O last 3 completed shifts:  In: 2336.49 [P.O.:480; I.V.:1856.49]  Out: 2075 [Urine:1710; Chest Tube:365]    3. PHYSICAL EXAMINATION:     General: seated in chair, very pleasant, appropriately interactive   Neuro: Alert and   Resp: CTAB on 4L oxygen  CV: Regular rate, regular rhythm, serosanguinous output from chest tubes  Abdomen: Soft, Non-distended, minimal tenderness upper > lower, improved today  Incisions: c/d/i  Extremities: warm and well perfused    4. INVESTIGATIONS:   Arterial Blood Gases   Recent Labs   Lab 02/25/19  1717 02/25/19  1502 02/25/19  0918 02/25/19  0350   PH 7.32* 7.31* 7.33* 7.39   PCO2 36 51* 46* 42   PO2 110* 75* 125* 87   HCO3 19* 26 24 25     Complete Blood Count   Recent Labs   Lab 02/27/19  0421 02/26/19  2242 02/26/19  1533 02/26/19  1006   WBC 15.2* 17.8*  20.7* 19.4*   HGB 8.6* 9.2* 10.7* 10.2*   * 162 170 138*     Basic Metabolic Panel  Recent Labs   Lab 02/27/19  0421 02/26/19  2242 02/26/19  1533 02/26/19  1006    139 144 144   POTASSIUM 4.0 4.3 4.8 3.9   CHLORIDE 104 104 107 106   CO2 23 22 26 25   BUN 59* 58* 56* 50*   CR 1.68* 1.58* 1.58* 1.56*   * 198* 148* 205*     Liver Function Tests  Recent Labs   Lab 02/25/19  1502 02/24/19  1817 02/24/19  1556 02/24/19  0340 02/23/19  1835   * 92*  --   --  21   ALT 28 24  --   --  15   ALKPHOS 89 96  --   --  169*   BILITOTAL 1.4* 2.7*  --   --  1.6*   ALBUMIN 3.4 2.4*  --   --  3.4   INR 1.29* 1.50* 1.57* 1.19* 1.20*     Pancreatic Enzymes  Recent Labs   Lab 02/23/19  1835   AMYLASE 22*     Coagulation Profile  Recent Labs   Lab 02/27/19  0421 02/25/19  1502 02/24/19 1817 02/24/19  1556 02/24/19  0340   INR  --  1.29* 1.50* 1.57* 1.19*   PTT 38* 32 55* 38*  --          5. RADIOLOGY:   No results found for this or any previous visit (from the past 24 hour(s)).    =========================================

## 2019-02-28 ENCOUNTER — APPOINTMENT (OUTPATIENT)
Dept: PHYSICAL THERAPY | Facility: CLINIC | Age: 54
DRG: 001 | End: 2019-02-28
Attending: INTERNAL MEDICINE
Payer: COMMERCIAL

## 2019-02-28 ENCOUNTER — APPOINTMENT (OUTPATIENT)
Dept: GENERAL RADIOLOGY | Facility: CLINIC | Age: 54
DRG: 001 | End: 2019-02-28
Attending: PHYSICIAN ASSISTANT
Payer: COMMERCIAL

## 2019-02-28 ENCOUNTER — APPOINTMENT (OUTPATIENT)
Dept: OCCUPATIONAL THERAPY | Facility: CLINIC | Age: 54
DRG: 001 | End: 2019-02-28
Attending: INTERNAL MEDICINE
Payer: COMMERCIAL

## 2019-02-28 ENCOUNTER — APPOINTMENT (OUTPATIENT)
Dept: SPEECH THERAPY | Facility: CLINIC | Age: 54
DRG: 001 | End: 2019-02-28
Attending: INTERNAL MEDICINE
Payer: COMMERCIAL

## 2019-02-28 LAB
ANION GAP SERPL CALCULATED.3IONS-SCNC: 11 MMOL/L (ref 3–14)
ANION GAP SERPL CALCULATED.3IONS-SCNC: 11 MMOL/L (ref 3–14)
ANION GAP SERPL CALCULATED.3IONS-SCNC: 13 MMOL/L (ref 3–14)
ANION GAP SERPL CALCULATED.3IONS-SCNC: 15 MMOL/L (ref 3–14)
BASE DEFICIT BLDV-SCNC: 0.2 MMOL/L
BASE DEFICIT BLDV-SCNC: 0.6 MMOL/L
BASE DEFICIT BLDV-SCNC: 1.5 MMOL/L
BASE DEFICIT BLDV-SCNC: 1.8 MMOL/L
BASE EXCESS BLDV CALC-SCNC: 0.6 MMOL/L
BASE EXCESS BLDV CALC-SCNC: 1.1 MMOL/L
BUN SERPL-MCNC: 50 MG/DL (ref 7–30)
BUN SERPL-MCNC: 56 MG/DL (ref 7–30)
BUN SERPL-MCNC: 57 MG/DL (ref 7–30)
BUN SERPL-MCNC: 59 MG/DL (ref 7–30)
CALCIUM SERPL-MCNC: 6.7 MG/DL (ref 8.5–10.1)
CALCIUM SERPL-MCNC: 7.4 MG/DL (ref 8.5–10.1)
CALCIUM SERPL-MCNC: 7.6 MG/DL (ref 8.5–10.1)
CALCIUM SERPL-MCNC: 8.5 MG/DL (ref 8.5–10.1)
CELL TYPE ALLO: NORMAL
CELL TYPE AUTO: NORMAL
CHANNELSHIFTALLOB1: -69
CHANNELSHIFTALLOB2: -70
CHANNELSHIFTALLOT1: -40
CHANNELSHIFTALLOT2: -51
CHANNELSHIFTAUTOB1: -30
CHANNELSHIFTAUTOB2: -37
CHANNELSHIFTAUTOT1: -35
CHANNELSHIFTAUTOT2: -38
CHLORIDE SERPL-SCNC: 102 MMOL/L (ref 94–109)
CHLORIDE SERPL-SCNC: 102 MMOL/L (ref 94–109)
CHLORIDE SERPL-SCNC: 108 MMOL/L (ref 94–109)
CHLORIDE SERPL-SCNC: 98 MMOL/L (ref 94–109)
CO2 SERPL-SCNC: 19 MMOL/L (ref 20–32)
CO2 SERPL-SCNC: 21 MMOL/L (ref 20–32)
COMMENT ALLOB2: NORMAL
CREAT SERPL-MCNC: 1.2 MG/DL (ref 0.66–1.25)
CREAT SERPL-MCNC: 1.43 MG/DL (ref 0.66–1.25)
CREAT SERPL-MCNC: 1.49 MG/DL (ref 0.66–1.25)
CREAT SERPL-MCNC: 1.54 MG/DL (ref 0.66–1.25)
CROSSMATCHDATEALLO: NORMAL
CROSSMATCHDATEAUTO: NORMAL
DONOR ALLO: NORMAL
DONOR AUTO: NORMAL
DONORCELLDATE ALLO: NORMAL
DONORCELLDATE AUTO: NORMAL
ERYTHROCYTE [DISTWIDTH] IN BLOOD BY AUTOMATED COUNT: 14.1 % (ref 10–15)
ERYTHROCYTE [DISTWIDTH] IN BLOOD BY AUTOMATED COUNT: 14.2 % (ref 10–15)
ERYTHROCYTE [DISTWIDTH] IN BLOOD BY AUTOMATED COUNT: 14.3 % (ref 10–15)
ERYTHROCYTE [DISTWIDTH] IN BLOOD BY AUTOMATED COUNT: 14.3 % (ref 10–15)
GFR SERPL CREATININE-BSD FRML MDRD: 50 ML/MIN/{1.73_M2}
GFR SERPL CREATININE-BSD FRML MDRD: 52 ML/MIN/{1.73_M2}
GFR SERPL CREATININE-BSD FRML MDRD: 54 ML/MIN/{1.73_M2}
GFR SERPL CREATININE-BSD FRML MDRD: 67 ML/MIN/{1.73_M2}
GLUCOSE BLDC GLUCOMTR-MCNC: 131 MG/DL (ref 70–99)
GLUCOSE BLDC GLUCOMTR-MCNC: 134 MG/DL (ref 70–99)
GLUCOSE BLDC GLUCOMTR-MCNC: 145 MG/DL (ref 70–99)
GLUCOSE BLDC GLUCOMTR-MCNC: 157 MG/DL (ref 70–99)
GLUCOSE SERPL-MCNC: 117 MG/DL (ref 70–99)
GLUCOSE SERPL-MCNC: 148 MG/DL (ref 70–99)
GLUCOSE SERPL-MCNC: 150 MG/DL (ref 70–99)
GLUCOSE SERPL-MCNC: 173 MG/DL (ref 70–99)
HCO3 BLDV-SCNC: 23 MMOL/L (ref 21–28)
HCO3 BLDV-SCNC: 23 MMOL/L (ref 21–28)
HCO3 BLDV-SCNC: 24 MMOL/L (ref 21–28)
HCO3 BLDV-SCNC: 25 MMOL/L (ref 21–28)
HCO3 BLDV-SCNC: 25 MMOL/L (ref 21–28)
HCO3 BLDV-SCNC: 26 MMOL/L (ref 21–28)
HCT VFR BLD AUTO: 28.1 % (ref 40–53)
HCT VFR BLD AUTO: 28.6 % (ref 40–53)
HCT VFR BLD AUTO: 29.7 % (ref 40–53)
HCT VFR BLD AUTO: 30.1 % (ref 40–53)
HGB BLD-MCNC: 9 G/DL (ref 13.3–17.7)
HGB BLD-MCNC: 9.2 G/DL (ref 13.3–17.7)
HGB BLD-MCNC: 9.5 G/DL (ref 13.3–17.7)
HGB BLD-MCNC: 9.6 G/DL (ref 13.3–17.7)
LMWH PPP CHRO-ACNC: 0.19 IU/ML
LMWH PPP CHRO-ACNC: <0.1 IU/ML
MAGNESIUM SERPL-MCNC: 2.6 MG/DL (ref 1.6–2.3)
MAGNESIUM SERPL-MCNC: 2.9 MG/DL (ref 1.6–2.3)
MAGNESIUM SERPL-MCNC: 3 MG/DL (ref 1.6–2.3)
MCH RBC QN AUTO: 29.5 PG (ref 26.5–33)
MCH RBC QN AUTO: 29.6 PG (ref 26.5–33)
MCH RBC QN AUTO: 29.6 PG (ref 26.5–33)
MCH RBC QN AUTO: 29.8 PG (ref 26.5–33)
MCHC RBC AUTO-ENTMCNC: 31.6 G/DL (ref 31.5–36.5)
MCHC RBC AUTO-ENTMCNC: 32 G/DL (ref 31.5–36.5)
MCHC RBC AUTO-ENTMCNC: 32.2 G/DL (ref 31.5–36.5)
MCHC RBC AUTO-ENTMCNC: 32.3 G/DL (ref 31.5–36.5)
MCV RBC AUTO: 92 FL (ref 78–100)
MCV RBC AUTO: 94 FL (ref 78–100)
O2/TOTAL GAS SETTING VFR VENT: 21 %
O2/TOTAL GAS SETTING VFR VENT: NORMAL %
O2/TOTAL GAS SETTING VFR VENT: NORMAL %
OXYHGB MFR BLDV: 39 %
OXYHGB MFR BLDV: 45 %
OXYHGB MFR BLDV: 51 %
OXYHGB MFR BLDV: 61 %
OXYHGB MFR BLDV: 62 %
OXYHGB MFR BLDV: 64 %
PCO2 BLDV: 37 MM HG (ref 40–50)
PCO2 BLDV: 37 MM HG (ref 40–50)
PCO2 BLDV: 39 MM HG (ref 40–50)
PCO2 BLDV: 39 MM HG (ref 40–50)
PCO2 BLDV: 40 MM HG (ref 40–50)
PCO2 BLDV: 41 MM HG (ref 40–50)
PH BLDV: 7.39 PH (ref 7.32–7.43)
PH BLDV: 7.4 PH (ref 7.32–7.43)
PH BLDV: 7.4 PH (ref 7.32–7.43)
PH BLDV: 7.41 PH (ref 7.32–7.43)
PHOSPHATE SERPL-MCNC: 2.7 MG/DL (ref 2.5–4.5)
PHOSPHATE SERPL-MCNC: 3 MG/DL (ref 2.5–4.5)
PLATELET # BLD AUTO: 165 10E9/L (ref 150–450)
PLATELET # BLD AUTO: 166 10E9/L (ref 150–450)
PLATELET # BLD AUTO: 175 10E9/L (ref 150–450)
PLATELET # BLD AUTO: 181 10E9/L (ref 150–450)
PO2 BLDV: 26 MM HG (ref 25–47)
PO2 BLDV: 28 MM HG (ref 25–47)
PO2 BLDV: 31 MM HG (ref 25–47)
PO2 BLDV: 36 MM HG (ref 25–47)
PO2 BLDV: 37 MM HG (ref 25–47)
PO2 BLDV: 54 MM HG (ref 25–47)
POS CUT OFF ALLO B: >101
POS CUT OFF ALLO T: >67
POS CUT OFF AUTO B: >101
POS CUT OFF AUTO T: >67
POTASSIUM SERPL-SCNC: 3.3 MMOL/L (ref 3.4–5.3)
POTASSIUM SERPL-SCNC: 3.6 MMOL/L (ref 3.4–5.3)
POTASSIUM SERPL-SCNC: 3.7 MMOL/L (ref 3.4–5.3)
POTASSIUM SERPL-SCNC: 4.3 MMOL/L (ref 3.4–5.3)
RBC # BLD AUTO: 3.04 10E12/L (ref 4.4–5.9)
RBC # BLD AUTO: 3.12 10E12/L (ref 4.4–5.9)
RBC # BLD AUTO: 3.21 10E12/L (ref 4.4–5.9)
RBC # BLD AUTO: 3.22 10E12/L (ref 4.4–5.9)
RESULT ALLO B1: NORMAL
RESULT ALLO B2: NORMAL
RESULT ALLO T1: NORMAL
RESULT ALLO T2: NORMAL
RESULT AUTO B1: NORMAL
RESULT AUTO B2: NORMAL
RESULT AUTO T1: NORMAL
RESULT AUTO T2: NORMAL
SA1 CELL: NORMAL
SA1 COMMENTS: NORMAL
SA1 HI RISK ABY: NORMAL
SA1 MOD RISK ABY: NORMAL
SA1 TEST METHOD: NORMAL
SA2 CELL: NORMAL
SA2 COMMENTS: NORMAL
SA2 HI RISK ABY UA: NORMAL
SA2 MOD RISK ABY: NORMAL
SA2 TEST METHOD: NORMAL
SERUM DATE ALLO B1: NORMAL
SERUM DATE ALLO B2: NORMAL
SERUM DATE ALLO T1: NORMAL
SERUM DATE ALLO T2: NORMAL
SERUM DATE AUTO B1: NORMAL
SERUM DATE AUTO B2: NORMAL
SERUM DATE AUTO T1: NORMAL
SERUM DATE AUTO T2: NORMAL
SODIUM SERPL-SCNC: 134 MMOL/L (ref 133–144)
SODIUM SERPL-SCNC: 134 MMOL/L (ref 133–144)
SODIUM SERPL-SCNC: 135 MMOL/L (ref 133–144)
SODIUM SERPL-SCNC: 137 MMOL/L (ref 133–144)
TACROLIMUS BLD-MCNC: <3 UG/L (ref 5–15)
TESTMETHODALLO: NORMAL
TESTMETHODAUTO: NORMAL
TME LAST DOSE: ABNORMAL H
TREATMENT ALLO B1: NORMAL
TREATMENT ALLO B2: NORMAL
TREATMENT ALLO T1: NORMAL
TREATMENT ALLO T2: NORMAL
TREATMENT AUTO B1: NORMAL
TREATMENT AUTO B2: NORMAL
TREATMENT AUTO T1: NORMAL
TREATMENT AUTO T2: NORMAL
UNACCEPTABLE ANTIGEN: NORMAL
UNOS CPRA: 0
WBC # BLD AUTO: 11.3 10E9/L (ref 4–11)
WBC # BLD AUTO: 11.6 10E9/L (ref 4–11)
WBC # BLD AUTO: 11.7 10E9/L (ref 4–11)
WBC # BLD AUTO: 9.2 10E9/L (ref 4–11)

## 2019-02-28 PROCEDURE — 25000128 H RX IP 250 OP 636: Performed by: STUDENT IN AN ORGANIZED HEALTH CARE EDUCATION/TRAINING PROGRAM

## 2019-02-28 PROCEDURE — 25000125 ZZHC RX 250: Performed by: STUDENT IN AN ORGANIZED HEALTH CARE EDUCATION/TRAINING PROGRAM

## 2019-02-28 PROCEDURE — 25000131 ZZH RX MED GY IP 250 OP 636 PS 637: Performed by: INTERNAL MEDICINE

## 2019-02-28 PROCEDURE — 84100 ASSAY OF PHOSPHORUS: CPT | Performed by: STUDENT IN AN ORGANIZED HEALTH CARE EDUCATION/TRAINING PROGRAM

## 2019-02-28 PROCEDURE — 97116 GAIT TRAINING THERAPY: CPT | Mod: GP

## 2019-02-28 PROCEDURE — 25000132 ZZH RX MED GY IP 250 OP 250 PS 637: Performed by: INTERNAL MEDICINE

## 2019-02-28 PROCEDURE — 97530 THERAPEUTIC ACTIVITIES: CPT | Mod: GP

## 2019-02-28 PROCEDURE — 84100 ASSAY OF PHOSPHORUS: CPT | Performed by: PHYSICIAN ASSISTANT

## 2019-02-28 PROCEDURE — C9113 INJ PANTOPRAZOLE SODIUM, VIA: HCPCS | Performed by: NURSE PRACTITIONER

## 2019-02-28 PROCEDURE — 25000128 H RX IP 250 OP 636: Performed by: INTERNAL MEDICINE

## 2019-02-28 PROCEDURE — 00000146 ZZHCL STATISTIC GLUCOSE BY METER IP

## 2019-02-28 PROCEDURE — 85520 HEPARIN ASSAY: CPT | Performed by: PHYSICIAN ASSISTANT

## 2019-02-28 PROCEDURE — 80197 ASSAY OF TACROLIMUS: CPT | Performed by: INTERNAL MEDICINE

## 2019-02-28 PROCEDURE — 85027 COMPLETE CBC AUTOMATED: CPT | Performed by: STUDENT IN AN ORGANIZED HEALTH CARE EDUCATION/TRAINING PROGRAM

## 2019-02-28 PROCEDURE — 25000132 ZZH RX MED GY IP 250 OP 250 PS 637: Performed by: STUDENT IN AN ORGANIZED HEALTH CARE EDUCATION/TRAINING PROGRAM

## 2019-02-28 PROCEDURE — 20000004 ZZH R&B ICU UMMC

## 2019-02-28 PROCEDURE — 82805 BLOOD GASES W/O2 SATURATION: CPT | Performed by: STUDENT IN AN ORGANIZED HEALTH CARE EDUCATION/TRAINING PROGRAM

## 2019-02-28 PROCEDURE — 85027 COMPLETE CBC AUTOMATED: CPT | Performed by: PHYSICIAN ASSISTANT

## 2019-02-28 PROCEDURE — 25000128 H RX IP 250 OP 636: Performed by: NURSE ANESTHETIST, CERTIFIED REGISTERED

## 2019-02-28 PROCEDURE — 25000132 ZZH RX MED GY IP 250 OP 250 PS 637: Performed by: ANESTHESIOLOGY

## 2019-02-28 PROCEDURE — 85520 HEPARIN ASSAY: CPT | Performed by: STUDENT IN AN ORGANIZED HEALTH CARE EDUCATION/TRAINING PROGRAM

## 2019-02-28 PROCEDURE — 80048 BASIC METABOLIC PNL TOTAL CA: CPT | Performed by: STUDENT IN AN ORGANIZED HEALTH CARE EDUCATION/TRAINING PROGRAM

## 2019-02-28 PROCEDURE — 92526 ORAL FUNCTION THERAPY: CPT | Mod: GN

## 2019-02-28 PROCEDURE — 25000125 ZZHC RX 250: Performed by: INTERNAL MEDICINE

## 2019-02-28 PROCEDURE — 80048 BASIC METABOLIC PNL TOTAL CA: CPT | Performed by: PHYSICIAN ASSISTANT

## 2019-02-28 PROCEDURE — 83735 ASSAY OF MAGNESIUM: CPT | Performed by: STUDENT IN AN ORGANIZED HEALTH CARE EDUCATION/TRAINING PROGRAM

## 2019-02-28 PROCEDURE — 25800030 ZZH RX IP 258 OP 636: Performed by: STUDENT IN AN ORGANIZED HEALTH CARE EDUCATION/TRAINING PROGRAM

## 2019-02-28 PROCEDURE — 71045 X-RAY EXAM CHEST 1 VIEW: CPT

## 2019-02-28 PROCEDURE — 82805 BLOOD GASES W/O2 SATURATION: CPT | Performed by: THORACIC SURGERY (CARDIOTHORACIC VASCULAR SURGERY)

## 2019-02-28 PROCEDURE — 25800030 ZZH RX IP 258 OP 636: Performed by: NURSE ANESTHETIST, CERTIFIED REGISTERED

## 2019-02-28 PROCEDURE — 83735 ASSAY OF MAGNESIUM: CPT | Performed by: PHYSICIAN ASSISTANT

## 2019-02-28 PROCEDURE — 25000132 ZZH RX MED GY IP 250 OP 250 PS 637: Performed by: NURSE PRACTITIONER

## 2019-02-28 PROCEDURE — 82805 BLOOD GASES W/O2 SATURATION: CPT | Performed by: PHYSICIAN ASSISTANT

## 2019-02-28 PROCEDURE — 99233 SBSQ HOSP IP/OBS HIGH 50: CPT | Mod: GC | Performed by: INTERNAL MEDICINE

## 2019-02-28 PROCEDURE — 25000128 H RX IP 250 OP 636: Performed by: RADIOLOGY

## 2019-02-28 PROCEDURE — 25000128 H RX IP 250 OP 636: Performed by: NURSE PRACTITIONER

## 2019-02-28 PROCEDURE — 97535 SELF CARE MNGMENT TRAINING: CPT | Mod: GO | Performed by: OCCUPATIONAL THERAPIST

## 2019-02-28 RX ORDER — FUROSEMIDE 10 MG/ML
80 INJECTION INTRAMUSCULAR; INTRAVENOUS ONCE
Status: COMPLETED | OUTPATIENT
Start: 2019-02-28 | End: 2019-02-28

## 2019-02-28 RX ORDER — TACROLIMUS 1 MG/1
1 CAPSULE ORAL
Status: DISCONTINUED | OUTPATIENT
Start: 2019-02-28 | End: 2019-03-03

## 2019-02-28 RX ORDER — PANTOPRAZOLE SODIUM 40 MG/1
40 TABLET, DELAYED RELEASE ORAL
Status: DISCONTINUED | OUTPATIENT
Start: 2019-03-01 | End: 2019-03-07

## 2019-02-28 RX ORDER — HYDRALAZINE HYDROCHLORIDE 25 MG/1
75 TABLET, FILM COATED ORAL EVERY 8 HOURS SCHEDULED
Status: DISCONTINUED | OUTPATIENT
Start: 2019-03-01 | End: 2019-03-02

## 2019-02-28 RX ORDER — FUROSEMIDE 10 MG/ML
60 INJECTION INTRAMUSCULAR; INTRAVENOUS ONCE
Status: COMPLETED | OUTPATIENT
Start: 2019-02-28 | End: 2019-02-28

## 2019-02-28 RX ADMIN — ACETAMINOPHEN 650 MG: 325 TABLET, FILM COATED ORAL at 03:54

## 2019-02-28 RX ADMIN — PREDNISONE 25 MG: 5 TABLET ORAL at 17:31

## 2019-02-28 RX ADMIN — HYDRALAZINE HYDROCHLORIDE 50 MG: 25 TABLET ORAL at 05:32

## 2019-02-28 RX ADMIN — TACROLIMUS 0.5 MG: 0.5 CAPSULE ORAL at 08:10

## 2019-02-28 RX ADMIN — ACETAMINOPHEN 650 MG: 325 TABLET, FILM COATED ORAL at 08:30

## 2019-02-28 RX ADMIN — Medication 500000 UNITS: at 16:20

## 2019-02-28 RX ADMIN — FLUTICASONE PROPIONATE 2 PUFF: 220 AEROSOL, METERED RESPIRATORY (INHALATION) at 20:05

## 2019-02-28 RX ADMIN — POTASSIUM CHLORIDE 20 MEQ: 400 INJECTION, SOLUTION INTRAVENOUS at 06:11

## 2019-02-28 RX ADMIN — TERBUTALINE SULFATE 10 MG: 5 TABLET ORAL at 00:06

## 2019-02-28 RX ADMIN — HYDRALAZINE HYDROCHLORIDE 50 MG: 25 TABLET ORAL at 14:01

## 2019-02-28 RX ADMIN — ACETAMINOPHEN 650 MG: 325 TABLET, FILM COATED ORAL at 14:01

## 2019-02-28 RX ADMIN — EPINEPHRINE 0.02 MCG/KG/MIN: 1 INJECTION PARENTERAL at 08:33

## 2019-02-28 RX ADMIN — MYCOPHENOLATE MOFETIL 1500 MG: 500 TABLET ORAL at 08:06

## 2019-02-28 RX ADMIN — PIPERACILLIN AND TAZOBACTAM 3.38 G: 3; .375 INJECTION, POWDER, FOR SOLUTION INTRAVENOUS at 08:16

## 2019-02-28 RX ADMIN — POTASSIUM CHLORIDE 20 MEQ: 400 INJECTION, SOLUTION INTRAVENOUS at 07:57

## 2019-02-28 RX ADMIN — ISOPROTERENOL HYDROCHLORIDE 0.03 MCG/KG/MIN: 0.2 INJECTION, SOLUTION INTRAMUSCULAR; INTRAVENOUS at 13:33

## 2019-02-28 RX ADMIN — ACETAMINOPHEN 650 MG: 325 TABLET, FILM COATED ORAL at 18:31

## 2019-02-28 RX ADMIN — OXYCODONE HYDROCHLORIDE 5 MG: 5 TABLET ORAL at 22:29

## 2019-02-28 RX ADMIN — HYDRALAZINE HYDROCHLORIDE 50 MG: 25 TABLET ORAL at 21:28

## 2019-02-28 RX ADMIN — OXYCODONE HYDROCHLORIDE 5 MG: 5 TABLET ORAL at 03:54

## 2019-02-28 RX ADMIN — FUROSEMIDE 60 MG: 10 INJECTION, SOLUTION INTRAVENOUS at 10:53

## 2019-02-28 RX ADMIN — OXYCODONE HYDROCHLORIDE 5 MG: 5 TABLET ORAL at 14:01

## 2019-02-28 RX ADMIN — Medication 5 ML: at 17:32

## 2019-02-28 RX ADMIN — SENNOSIDES AND DOCUSATE SODIUM 2 TABLET: 8.6; 5 TABLET ORAL at 08:07

## 2019-02-28 RX ADMIN — LIDOCAINE 1 PATCH: 560 PATCH PERCUTANEOUS; TOPICAL; TRANSDERMAL at 08:04

## 2019-02-28 RX ADMIN — ACETAMINOPHEN 650 MG: 325 TABLET, FILM COATED ORAL at 22:29

## 2019-02-28 RX ADMIN — Medication 500000 UNITS: at 08:06

## 2019-02-28 RX ADMIN — FUROSEMIDE 80 MG: 10 INJECTION, SOLUTION INTRAVENOUS at 17:28

## 2019-02-28 RX ADMIN — MYCOPHENOLATE MOFETIL 1500 MG: 500 TABLET ORAL at 17:30

## 2019-02-28 RX ADMIN — TACROLIMUS 1 MG: 1 CAPSULE ORAL at 17:31

## 2019-02-28 RX ADMIN — POTASSIUM CHLORIDE 20 MEQ: 1.5 POWDER, FOR SOLUTION ORAL at 14:02

## 2019-02-28 RX ADMIN — LEVOTHYROXINE SODIUM 100 MCG: 100 TABLET ORAL at 08:04

## 2019-02-28 RX ADMIN — MESALAMINE 2400 MG: 800 TABLET, DELAYED RELEASE ORAL at 08:05

## 2019-02-28 RX ADMIN — HEPARIN SODIUM 800 UNITS/HR: 10000 INJECTION, SOLUTION INTRAVENOUS at 02:19

## 2019-02-28 RX ADMIN — Medication 500000 UNITS: at 20:05

## 2019-02-28 RX ADMIN — TERBUTALINE SULFATE 10 MG: 5 TABLET ORAL at 16:21

## 2019-02-28 RX ADMIN — PANTOPRAZOLE SODIUM 40 MG: 40 INJECTION, POWDER, FOR SOLUTION INTRAVENOUS at 08:06

## 2019-02-28 RX ADMIN — MONTELUKAST SODIUM 10 MG: 10 TABLET, COATED ORAL at 21:28

## 2019-02-28 RX ADMIN — Medication 500000 UNITS: at 11:58

## 2019-02-28 RX ADMIN — MESALAMINE 2400 MG: 800 TABLET, DELAYED RELEASE ORAL at 20:05

## 2019-02-28 RX ADMIN — OXYCODONE HYDROCHLORIDE 5 MG: 5 TABLET ORAL at 18:31

## 2019-02-28 RX ADMIN — DULOXETINE 20 MG: 20 CAPSULE, DELAYED RELEASE ORAL at 08:03

## 2019-02-28 RX ADMIN — CHLOROTHIAZIDE SODIUM 500 MG: 500 INJECTION, POWDER, LYOPHILIZED, FOR SOLUTION INTRAVENOUS at 22:32

## 2019-02-28 RX ADMIN — PIPERACILLIN AND TAZOBACTAM 3.38 G: 3; .375 INJECTION, POWDER, FOR SOLUTION INTRAVENOUS at 02:19

## 2019-02-28 RX ADMIN — VANCOMYCIN HYDROCHLORIDE 1000 MG: 1 INJECTION, SOLUTION INTRAVENOUS at 09:20

## 2019-02-28 RX ADMIN — ISOPROTERENOL HYDROCHLORIDE 0.03 MCG/KG/MIN: 0.2 INJECTION, SOLUTION INTRAMUSCULAR; INTRAVENOUS at 05:32

## 2019-02-28 RX ADMIN — FLUTICASONE PROPIONATE 2 PUFF: 220 AEROSOL, METERED RESPIRATORY (INHALATION) at 08:03

## 2019-02-28 RX ADMIN — PREDNISONE 25 MG: 5 TABLET ORAL at 08:07

## 2019-02-28 RX ADMIN — TERBUTALINE SULFATE 10 MG: 5 TABLET ORAL at 08:15

## 2019-02-28 RX ADMIN — INSULIN ASPART 1 UNITS: 100 INJECTION, SOLUTION INTRAVENOUS; SUBCUTANEOUS at 11:58

## 2019-02-28 RX ADMIN — POLYETHYLENE GLYCOL 3350 17 G: 17 POWDER, FOR SOLUTION ORAL at 08:07

## 2019-02-28 RX ADMIN — POTASSIUM CHLORIDE 20 MEQ: 1.5 POWDER, FOR SOLUTION ORAL at 17:28

## 2019-02-28 ASSESSMENT — ACTIVITIES OF DAILY LIVING (ADL)
ADLS_ACUITY_SCORE: 12
ADLS_ACUITY_SCORE: 11

## 2019-02-28 ASSESSMENT — PAIN DESCRIPTION - DESCRIPTORS
DESCRIPTORS: DISCOMFORT
DESCRIPTORS: CONSTANT;DISCOMFORT
DESCRIPTORS: DISCOMFORT
DESCRIPTORS: CONSTANT;DISCOMFORT

## 2019-02-28 ASSESSMENT — MIFFLIN-ST. JEOR: SCORE: 1420.5

## 2019-02-28 NOTE — PLAN OF CARE
POD# 4 s/p heart transplant.  Patient reports being tired today.  Pain well controlled with PRN acetaminophen and oxycodone.  Sats 98% on RA.  [place] continues with q4hr FICKS: CVP 14-16; PAP 36-38/18-20; svo2 60s; CO4.5-5.5; CI 2.7-3.2; SVR 5290-5291.  Epi dc'd.  Isoprel continues with scheduled terbutaline.  Hep 10a therapeutic - next level due tomorrow morning.  Chest tubes continue x5.  Diarrhea x3 today.  Up in chair x2 and walked in hallway x1 today.    Addendum: Notified team of 4pm JOSEPHINE (4 hours since epi dc'd). PAP 44/21; CVP 18; svo2 51; CO 3.6; CI 2.1; SVR 1618

## 2019-02-28 NOTE — PROGRESS NOTES
Late entry for 2/25/19 overnight shift.    Nights  CAROL  Pt sedated and intubated overnight.  Sedation holiday at 0500 with restlessness and pt pulling at ET tube.  Otherwise Pt sedated with Propofol 30-45 mcg/kg/min overnight.  Epi and Isuprel gtt's on all night to keep MAP >65 AN HR >100.  Titrating up on both gtt's all night to maintain BP and HR goals.  Epi @0.08mcg/kg/min and Isuprel at 0.08mcg/kg/min at final level this am.  Updated Moonlighter (Surg) and Cards about increasing gtt's and both agreed to keep increasing.  Pt nodded head yes and no when sedation lifted.  IV Ancef and Cellcept, Methylprednisone given overnight.  Urine about 50cc/hr and Albumin given overnight.  BG max 250's and insulin gtt 0.5-12 units/hr and difficult to control.  R groin art line removed and pt still has L radial art line.  Preparing pt for extubation in the morning.  CVP 8, PA 30/15's, SVR 1100's, SVo2 68%.    P:  Extubate, wean sedation, hemodynamic monitoring.

## 2019-02-28 NOTE — PROGRESS NOTES
[facility] Progress Note           Assessment and Plan:   Everton Larios is a 54 yo gentleman who underwent orthotopic heart transplant 2/24/19 for non ischemic cardiomyopathy and cardiogenic shock.     #S/p OHT on 2/24:  Graft function:  - BP stable, on hydralazine 50mg TID, off nitroprusside  - stop epinephrine  - continue isuprel for HR support, now on terbutaline 10 q8h  - post op antibiotic coverage with vanc/zosyn  - additional lasix 60mg IV, estimated dry weight likely 126 lb     Immunosuppression:  - on MMF 1500 BID  - s/p methylpred 125 x 3 doses, prednisone taper until 20mg BID  - NOT Simulect protocol, on tacro 0.5 BID, check level      Prophylaxis:  - CMV +/+: medium risk, holding Valcyte for now  - PCP: holding Bactrim for now  - Thrush: start nystatin  - GI: on PPI  - CAV: holding ASA and statin for now      Biopsies:  - First biopsy on approximately 3/4     Goretex conduit for persistent left SVC:  - will need anticoagulation, advance heparin to low intensity     Rest of excellent care per CV ICU team    Constantin Rockwell MD PGY5  Cardiology Fellow    Patient was seen by and the above plan discussed with Dr. Young.       Subjective:     Patient denies current chest pain, dyspnea on exertion, orthopnea, PND, lightheadedness, or palpitations.  Has abdominal discomfort.          Medications:       bisacodyl  10 mg Rectal Daily     DULoxetine  20 mg Oral Daily     fluticasone  2 puff Inhalation Q12H     heparin lock flush  5 mL Intravenous Q24H     hydrALAZINE  50 mg Oral Q8H SHERRIE     insulin aspart  1-7 Units Subcutaneous TID AC     insulin aspart  1-5 Units Subcutaneous At Bedtime     levothyroxine  100 mcg Oral Daily     lidocaine  1 patch Transdermal Q24H     lidocaine   Transdermal Q8H     lidocaine   Transdermal Q24h     mesalamine  2,400 mg Oral BID     montelukast  10 mg Oral At Bedtime     mycophenolate  1,500 mg Oral BID IS     nystatin  500,000 Units Oral 4x Daily     pantoprazole  (PROTONIX) IV  40 mg Intravenous Daily with breakfast     piperacillin-tazobactam  3.375 g Intravenous Q6H     polyethylene glycol  17 g Oral Daily     predniSONE  25 mg Oral BID w/meals    Followed by     [START ON 3/1/2019] predniSONE  20 mg Oral BID w/meals     senna-docusate  1 tablet Oral BID    Or     senna-docusate  2 tablet Oral BID     sodium chloride (PF)  3 mL Intracatheter Q8H     tacrolimus  0.5 mg Oral BID IS     terbutaline  10 mg Oral Q8H     vancomycin (VANCOCIN) IV  1,000 mg Intravenous Q24H       EPINEPHrine IV infusion ADULT 0.02 mcg/kg/min (02/28/19 0600)     HEParin 800 Units/hr (02/28/19 0600)     isoproterenol (ISUPREL) infusion ADULT 0.03 mcg/kg/min (02/28/19 0600)     - MEDICATION INSTRUCTIONS -       nitroPRUsside (NIPRIDE) IV infusion ADULT/PEDS GREATER than or EQUAL to 45 kg std conc Stopped (02/27/19 2000)     - MEDICATION INSTRUCTIONS -       BETA BLOCKER NOT PRESCRIBED            Objective:          Physical Exam:   Temp:  [96.5  F (35.8  C)-98.8  F (37.1  C)] 97.7  F (36.5  C)  Heart Rate:  [92-99] 98  Resp:  [16-24] 20  MAP:  [60 mmHg-93 mmHg] 81 mmHg  Arterial Line BP: ()/(46-75) 133/64  SpO2:  [92 %-100 %] 98 %  I/O last 3 completed shifts:  In: 1093.83 [P.O.:240; I.V.:853.83]  Out: 2100 [Urine:1360; Chest Tube:740]    Vitals:    02/24/19 0400 02/25/19 0600 02/26/19 0400 02/27/19 0400   Weight: 54.7 kg (120 lb 8 oz) 58 kg (127 lb 13.9 oz) 57.7 kg (127 lb 3.3 oz) 59.9 kg (132 lb 0.9 oz)    02/28/19 0500   Weight: 61.1 kg (134 lb 11.2 oz)     CVP 14  PA 36/18  PCWP 14  SVO2 62  CO/CI 4.7/2.8  SVR 1000    GEN:  Alert, interactive, appears comfortable, NAD.  HEENT:  [place] R internal jugular   CV:  Regular rate and rhythm, no murmur or JVD.   CHEST: Subclavian incision sutured.  Chest tubes.  LUNGS:  On room air, normal work of breathing, Clear to auscultation bilaterally, no wheezes or crackles.   ABD:  Active bowel sounds, soft, non-tender/non-distended.  No  rebound/guarding/rigidity.  EXT:  No edema or cyanosis.  Hands/feet warm to touch with good signs of peripheral perfusion.   NEURO:  Alert and oriented, no new focal deficits appreciated.  PSCYH: Normal mood and affect         Data:   BMP  Recent Labs   Lab 02/28/19  0406 02/27/19  2226 02/27/19  1601 02/27/19  0936    132* 135 134   POTASSIUM 3.3* 3.8 3.4 3.8   CHLORIDE 98 98 98 100   RADAMES 8.5 8.2* 8.5 8.0*   CO2 21 22 22 21   BUN 59* 61* 60* 63*   CR 1.54* 1.54* 1.64* 1.65*   * 189* 147* 262*     LFTs  Recent Labs   Lab 02/25/19  1502 02/24/19  1817 02/23/19  1835   ALKPHOS 89 96 169*   * 92* 21   ALT 28 24 15   BILITOTAL 1.4* 2.7* 1.6*   PROTTOTAL 6.4* 5.3* 7.8   ALBUMIN 3.4 2.4* 3.4      CBC  Recent Labs   Lab 02/28/19  0406 02/27/19  2226 02/27/19  1601 02/27/19  0936   WBC 11.6* 13.7* 15.8* 16.4*   RBC 3.04* 3.16* 3.09* 3.21*   HGB 9.0* 9.4* 9.2* 9.6*   HCT 28.1* 28.7* 28.9* 30.1*   MCV 92 91 94 94   MCH 29.6 29.7 29.8 29.9   MCHC 32.0 32.8 31.8 31.9   RDW 14.3 14.1 14.2 14.0    178 166 165     INR  Recent Labs   Lab 02/25/19  1502 02/24/19  1817 02/24/19  1556 02/24/19  0340   INR 1.29* 1.50* 1.57* 1.19*

## 2019-02-28 NOTE — PLAN OF CARE
Up to chair x 2. Walked in room. Needs encouragement to be active and use IS.     Neuro: alert and oriented x 4. Able to make needs known.   Resp: lung sounds clear, on 2L NC. Productive cough. See flowsheeet for chest tube output.   CV: SR, MAP 70-80's, no ectopy. JOSEPHINE q4hr. Last PA 34/18, CVP 15, CI 2.4, SVO2 51%, SVR 1309.5. Afebrile.   GI/: adequate UOP, see flow sheet.Pink lady enema given today, patient did not have any productive stool. BM x 1, small liquid. Regular diet, good appetite.     Nipride titrated to keep MAP 70-80 bpm, currently at 0.2 mcg  Epi @ 0.02 mcg  Isuprel @ 0.03 mcg   Insulin gtt off, now on sliding scale.     Lines: CATA ERWIN, L AMADOU, L ART

## 2019-02-28 NOTE — PROGRESS NOTES
Transplant Social Work Services Progress Note      Date of Initial Social Work Evaluation: 11/18/18, again 1/30/19  Collaborated with: patient    Data: Everton remains in ICU post-heart transplant last Sunday. He was extubated a few days ago and awake and oriented. Today, he has a swan in his neck to measure heart function. Aware of transplant support group today, but not able to go with [place] in place. He reports parents were here yesterday, but won't be coming today.  Intervention: Met with Everton and offered emotional support. Inquired into questions/concerns and assessed coping. Discussed the difficulty in recovery during the first few weeks and how the medications can change coping abilities and emotional status.  Assessment: Everton seemed receptive to visit, but reported being in pain. Verified that recovery is difficult after transplant. Not able to come to group today and not up for visitors. Would benefit from ongoing SW assistance and support during this hospitalization.  Education provided by SW: Ongoing availability of SW  Plan:    Discharge Plans in Progress: TBD    Barriers to d/c plan: Still in ICU post-heart transplant    Follow up Plan: SW will continue to follow

## 2019-02-28 NOTE — PLAN OF CARE
Discharge Planner SLP   Patient plan for discharge: none stated  Current status: SLP: Pt with functional tolerance of current diet level. Recommend pt continue regular textures and thin liquids. Pt was able to independently recall and implement safe swallow strategies; swallow goals met.   Barriers to return to prior living situation: none per ST   Recommendations for discharge: defer to PT/OT  Rationale for recommendations: suspect pt is at baseline swallow function; will complete ST orders.        Entered by: Monique Christy 02/28/2019 9:54 AM        Speech Language Therapy Discharge Summary    Reason for therapy discharge:    All goals and outcomes met, no further needs identified.    Progress towards therapy goal(s). See goals on Care Plan in [facility] electronic health record for goal details.  Goals met    Therapy recommendation(s):    No further therapy is recommended.

## 2019-02-28 NOTE — PROGRESS NOTES
CV ICU PROGRESS NOTE  February 28, 2019      CO-MORBIDITIES:   Chronic systolic heart failure (H)  (primary encounter diagnosis)  Acute on chronic systolic congestive heart failure (H)    ASSESSMENT: Everton Larios is a 53 year old male with PMH etoh abuse, hypothyroidism, intermittent asthma, ulcerative colitis, Depression, Chronic HFrEF, NICM admitted with cardiogenic shock requiring subclavian balloon pump now s/p OHT 2/24/19 with Piyush Hosue and Christopher.     TODAY'S PROGRESS:   - Wean Epi per Cards 2  - Lasix 60mg x1 for diuresis    PLAN:  Neuro/ pain/ sedation:  - Monitor neurological status. Notify the MD for any acute changes in exam.  - Tylenol, oxycodone and PRN fentanyl     #Depression  PTA cymbalta 20mg     Pulmonary care:   - 2l NC  - Supp oxygen to maintain SpO2 >92%    #Intermittent asthma  - PTA Montelukast ordered    Cardiovascular:  HR goal >95  - Monitor hemodynamic status.   - MAP >65. HR goal > 95.  - Isopreterenol gtt. Wean epi gtt.  - Hydralazine 50mg q8h  - Terbutaline 10mg q8h   - Holding asa and statin for now    GI care:   #constipation  - Regular diet  - Senna, miralax and suppository for bowel regimen; MoM and miralax prn as well    #Intraperitoneal free air (CT obtained 2/26)  - Small peritoneal defect made during redo operation, closed with stitches intra-op- likely source of free air. No peritoneal signs, pain improving.     #Pierce's esophagus  #Ulcerative colitis  -PTA Mesalamine     Fluids/ Electrolytes/ Nutrition:   - TKO for IV fluid hydration  - No indication for parenteral nutrition.    Renal/ Fluid Balance:    - Urine output is adequate so far.  - Will continue to monitor intake and output.  - Lasix 60 mg IV for diuresis  - PTA Tamsulosin      Endocrine:    # Stress hyperglycemia  - sliding scale insulin as needed  # hypothyroidism  - Home levothyroxine      ID/ Antibiotics:  - Perioperative antibiotics.   - Vancomycin and zosyn x 3 days- completed.  - Holding CMV/PCP  prophylaxis for now.   - Nystatin for thrush     Heme:     #Bypass of persistent left SVC to R atrial appendage with [place]-Davis graft. Will require lifelong anti-coagulation for graft  - Hgb goal >7. Daily CBC.  - No active concern for bleeding.  - Low intensity heparin today     Prophylaxis:    - Mechanical prophylaxis for DVT  - Heparin as above  - Protonix qd     Lines/ tubes/ drains:  - RIJ, [place], Arterial line, med/pleural chest tubes     Disposition:  - CV ICU.     Patient seen, findings and plan discussed with CV ICU staff, Dr. Mederos.    Leyla Bowling MD  General Surgery PGY-3  ====================================    SUBJECTIVE:   Ambulated in hallway today  BM overnight and this morning  Nipride off and pacer wires capped  Voiding after maddox removed    OBJECTIVE:   1. VITAL SIGNS:   Temp:  [96.5  F (35.8  C)-98.8  F (37.1  C)] 97.6  F (36.4  C)  Heart Rate:  [92-99] 97  Resp:  [16-24] 20  MAP:  [60 mmHg-93 mmHg] 80 mmHg  Arterial Line BP: (101-155)/(46-75) 121/64  SpO2:  [92 %-100 %] 98 %  Resp: 20      2. INTAKE/ OUTPUT:   I/O last 3 completed shifts:  In: 1093.83 [P.O.:240; I.V.:853.83]  Out: 2100 [Urine:1360; Chest Tube:740]    3. PHYSICAL EXAMINATION:     General: very pleasant, appropriately interactive   Neuro: Alert and   Resp: CTAB on 2L oxygen  CV: Regular rate, regular rhythm, serosanguinous output from chest tubes  Abdomen: Soft, Non-distended, non-tender  Incisions: c/d/i  Extremities: warm and well perfused    4. INVESTIGATIONS:   Arterial Blood Gases   Recent Labs   Lab 02/25/19  1717 02/25/19  1502 02/25/19  0918 02/25/19  0350   PH 7.32* 7.31* 7.33* 7.39   PCO2 36 51* 46* 42   PO2 110* 75* 125* 87   HCO3 19* 26 24 25     Complete Blood Count   Recent Labs   Lab 02/28/19  0406 02/27/19  2226 02/27/19  1601 02/27/19  0936   WBC 11.6* 13.7* 15.8* 16.4*   HGB 9.0* 9.4* 9.2* 9.6*    178 166 165     Basic Metabolic Panel  Recent Labs   Lab 02/28/19  0406 02/27/19  2226 02/27/19  1601  02/27/19  0936    132* 135 134   POTASSIUM 3.3* 3.8 3.4 3.8   CHLORIDE 98 98 98 100   CO2 21 22 22 21   BUN 59* 61* 60* 63*   CR 1.54* 1.54* 1.64* 1.65*   * 189* 147* 262*     Liver Function Tests  Recent Labs   Lab 02/25/19  1502 02/24/19  1817 02/24/19  1556 02/24/19  0340 02/23/19  1835   * 92*  --   --  21   ALT 28 24  --   --  15   ALKPHOS 89 96  --   --  169*   BILITOTAL 1.4* 2.7*  --   --  1.6*   ALBUMIN 3.4 2.4*  --   --  3.4   INR 1.29* 1.50* 1.57* 1.19* 1.20*     Pancreatic Enzymes  Recent Labs   Lab 02/23/19  1835   AMYLASE 22*     Coagulation Profile  Recent Labs   Lab 02/27/19  0421 02/25/19  1502 02/24/19  1817 02/24/19  1556 02/24/19  0340   INR  --  1.29* 1.50* 1.57* 1.19*   PTT 38* 32 55* 38*  --          5. RADIOLOGY:   Recent Results (from the past 24 hour(s))   XR Chest Port 1 View    Narrative    Exam: XR CHEST PORT 1 VW, 2/28/2019 4:11 AM    Indication: confirm PA catheter position    Comparison: Chest radiograph dated 2/27/2019    Findings:   AP view of the chest. Right IJ [place]-Liz catheter with the tip in the  right main pulmonary artery. Bilateral chest tubes are unchanged.  Stable mediastinal drains. Intact median sternotomy wires.    No new focal airspace consolidation, pleural effusion or pneumothorax.  Slightly decreased atelectasis in the left lung base. No pleural  effusion or pneumothorax. Area again seen under the diaphragm.      Impression    Impression:   1. Pulmonary artery catheter with the tip in the right main pulmonary  artery. Additional postsurgical changes and support devices as  detailed above.  2. Slightly improved left basilar atelectasis. No new focal  consolidation.  3. Redemonstration of pneumoperitoneum.    I have personally reviewed the examination and initial interpretation  and I agree with the findings.    ZAC MIRZA MD       =========================================

## 2019-02-28 NOTE — PLAN OF CARE
Discharge Planner PT   Patient plan for discharge: agrees with rec.  Current status: STS x3 with IV pole and min A x1.  Stands well with minor use of UE. Transfers to chair at end of session with CGAx1. Amb 50 ft x2 with single HHA on IV pole and CGAx1 with w/c follow.  2/2 SOB and fatigue, requires 2 sitting rest breaks.      Barriers to return to prior living situation: weakness, pain, A with mobility  Recommendations for discharge: currently would need rehab but if doing well with transfers, gait and stairs, then anticipate home with A from parents and OP PT.   Rationale for recommendations: Pt currently below baseline, limited by pain and lines primarily.  With progress with PT and stairs, pt likely safe to discharge home.

## 2019-02-28 NOTE — PLAN OF CARE
Uneventful night. Enema, no result. Up with SB assist. Paul out. CI >2.5 SvO2 62. Sinus rhythm. CVP 16. PA 38/18. Capped pacer wires. Nipride off. Doing better with pulm hygiene. Still 2lpm O2 NC. 75mL/hr UOP.     Black Snyder RN 02/28/19 6:00 AM    Hours of cares [phone]

## 2019-02-28 NOTE — PROGRESS NOTES
CVTS PROGRESS NOTE  February 28, 2019      CO-MORBIDITIES:   Chronic systolic heart failure (H)  (primary encounter diagnosis)  Acute on chronic systolic congestive heart failure (H)    ASSESSMENT: Everton Larios is a 53 year old male with PMH etoh abuse, hypothyroidism, intermittent asthma, ulcerative colitis, Depression, Chronic HFrEF, NICM admitted with cardiogenic shock requiring subclavian balloon pump now s/p OHT 2/24/19 with Piyush House and Christopher.     TODAY'S PROGRESS:   - Wean Epi per Cards 2  - Lasix 60mg x1 for diuresis    PLAN:  Neuro/ pain/ sedation:  - Monitor neurological status. Notify the MD for any acute changes in exam.  - Tylenol, oxycodone and PRN fentanyl     #Depression  PTA cymbalta 20mg     Pulmonary care:   - 2l NC  - Supp oxygen to maintain SpO2 >92%    #Intermittent asthma  - PTA Montelukast ordered    Cardiovascular:  HR goal >95  - Monitor hemodynamic status.   - MAP >65. HR goal > 95.  - Isopreterenol gtt. Wean epi gtt.  - Hydralazine 50mg q8h  - Terbutaline 10mg q8h   - Holding asa and statin for now    GI care:   #constipation  - Regular diet  - Senna, miralax and suppository for bowel regimen; MoM and miralax prn as well    #Intraperitoneal free air (CT obtained 2/26)  - Small peritoneal defect made during redo operation, closed with stitches intra-op- likely source of free air. No peritoneal signs, pain improving.     #Pierce's esophagus  #Ulcerative colitis  -PTA Mesalamine     Fluids/ Electrolytes/ Nutrition:   - TKO for IV fluid hydration  - No indication for parenteral nutrition.    Renal/ Fluid Balance:    - Urine output is adequate so far.  - Will continue to monitor intake and output.  - Lasix 60 mg IV for diuresis  - PTA Tamsulosin      Endocrine:    # Stress hyperglycemia  - sliding scale insulin as needed  # hypothyroidism  - Home levothyroxine      ID/ Antibiotics:  - Perioperative antibiotics.   - Vancomycin and zosyn x 3 days- completed.  - Holding CMV/PCP  prophylaxis for now.   - Nystatin for thrush     Heme:     #Bypass of persistent left SVC to R atrial appendage with [place]-Davis graft. Will require lifelong anti-coagulation for graft  - Hgb goal >7. Daily CBC.  - No active concern for bleeding.  - Low intensity heparin today     Prophylaxis:    - Mechanical prophylaxis for DVT  - Heparin as above  - Protonix qd     Lines/ tubes/ drains:  - RIJ, [place], Arterial line, med/pleural chest tubes     Disposition:  - [facility] ICU.     Patient seen, findings and plan discussed with CVTS fellow.    Leyla Bowling MD  General Surgery PGY-3  ====================================    SUBJECTIVE:   Ambulated in hallway today  BM overnight and this morning  Nipride off and pacer wires capped  Voiding after maddox removed    OBJECTIVE:   1. VITAL SIGNS:   Temp:  [96.5  F (35.8  C)-98.8  F (37.1  C)] 97.5  F (36.4  C)  Heart Rate:  [94-99] 96  Resp:  [16-24] 20  MAP:  [60 mmHg-93 mmHg] 88 mmHg  Arterial Line BP: (101-155)/(46-75) 145/73  SpO2:  [92 %-100 %] 97 %  Resp: 20      2. INTAKE/ OUTPUT:   I/O last 3 completed shifts:  In: 1093.83 [P.O.:240; I.V.:853.83]  Out: 2100 [Urine:1360; Chest Tube:740]    3. PHYSICAL EXAMINATION:     General: very pleasant, appropriately interactive   Neuro: Alert and   Resp: CTAB on 2L oxygen  CV: Regular rate, regular rhythm, serosanguinous output from chest tubes  Abdomen: Soft, Non-distended, non-tender  Incisions: c/d/i  Extremities: warm and well perfused    4. INVESTIGATIONS:   Arterial Blood Gases   Recent Labs   Lab 02/25/19  1717 02/25/19  1502 02/25/19  0918 02/25/19  0350   PH 7.32* 7.31* 7.33* 7.39   PCO2 36 51* 46* 42   PO2 110* 75* 125* 87   HCO3 19* 26 24 25     Complete Blood Count   Recent Labs   Lab 02/28/19  1150 02/28/19  0406 02/27/19  2226 02/27/19  1601   WBC 11.7* 11.6* 13.7* 15.8*   HGB 9.6* 9.0* 9.4* 9.2*    166 178 166     Basic Metabolic Panel  Recent Labs   Lab 02/28/19  1150 02/28/19  0406 02/27/19  2226 02/27/19  1601     134 132* 135   POTASSIUM 3.7 3.3* 3.8 3.4   CHLORIDE 102 98 98 98   CO2 21 21 22 22   BUN 56* 59* 61* 60*   CR 1.43* 1.54* 1.54* 1.64*   * 150* 189* 147*     Liver Function Tests  Recent Labs   Lab 02/25/19  1502 02/24/19  1817 02/24/19  1556 02/24/19  0340 02/23/19  1835   * 92*  --   --  21   ALT 28 24  --   --  15   ALKPHOS 89 96  --   --  169*   BILITOTAL 1.4* 2.7*  --   --  1.6*   ALBUMIN 3.4 2.4*  --   --  3.4   INR 1.29* 1.50* 1.57* 1.19* 1.20*     Pancreatic Enzymes  Recent Labs   Lab 02/23/19  1835   AMYLASE 22*     Coagulation Profile  Recent Labs   Lab 02/27/19  0421 02/25/19  1502 02/24/19  1817 02/24/19  1556 02/24/19  0340   INR  --  1.29* 1.50* 1.57* 1.19*   PTT 38* 32 55* 38*  --          5. RADIOLOGY:   Recent Results (from the past 24 hour(s))   XR Chest Port 1 View    Narrative    Exam: XR CHEST PORT 1 VW, 2/28/2019 4:11 AM    Indication: confirm PA catheter position    Comparison: Chest radiograph dated 2/27/2019    Findings:   AP view of the chest. Right IJ [place]-Liz catheter with the tip in the  right main pulmonary artery. Bilateral chest tubes are unchanged.  Stable mediastinal drains. Intact median sternotomy wires.    No new focal airspace consolidation, pleural effusion or pneumothorax.  Slightly decreased atelectasis in the left lung base. No pleural  effusion or pneumothorax. Area again seen under the diaphragm.      Impression    Impression:   1. Pulmonary artery catheter with the tip in the right main pulmonary  artery. Additional postsurgical changes and support devices as  detailed above.  2. Slightly improved left basilar atelectasis. No new focal  consolidation.  3. Redemonstration of pneumoperitoneum.    I have personally reviewed the examination and initial interpretation  and I agree with the findings.    ZAC MIRZA MD       =========================================

## 2019-02-28 NOTE — PLAN OF CARE
Discharge Planner OT   Patient plan for discharge: not discussed  Current status: Pt able to complete ADLs standing with close SBA for balance - tolerated standing ~5 min x 2 bouts. Pt needed significant cuing for maintaining precautions during activity - writer provided further education/reinforcement. Pt's vitals stable with activity - c/o mild SOB and cues for PLB (HR mid 90s, BP 140s/70s - MAP near 90, O2 sat 96-98%).  Barriers to return to prior living situation: weakness, pain, surgical precautions, impaired balance  Recommendations for discharge: rehab at this time however pending progress pt likely will be able to discharge home with continued therapy by the time he is medically appropriate  Rationale for recommendations: to increase pt's (I) and safety with ADL/IADLs       Entered by: Bhumika Dunham 02/28/2019 4:45 PM

## 2019-03-01 ENCOUNTER — APPOINTMENT (OUTPATIENT)
Dept: GENERAL RADIOLOGY | Facility: CLINIC | Age: 54
DRG: 001 | End: 2019-03-01
Attending: PHYSICIAN ASSISTANT
Payer: COMMERCIAL

## 2019-03-01 ENCOUNTER — APPOINTMENT (OUTPATIENT)
Dept: PHYSICAL THERAPY | Facility: CLINIC | Age: 54
DRG: 001 | End: 2019-03-01
Attending: INTERNAL MEDICINE
Payer: COMMERCIAL

## 2019-03-01 ENCOUNTER — APPOINTMENT (OUTPATIENT)
Dept: CARDIOLOGY | Facility: CLINIC | Age: 54
DRG: 001 | End: 2019-03-01
Attending: INTERNAL MEDICINE
Payer: COMMERCIAL

## 2019-03-01 ENCOUNTER — APPOINTMENT (OUTPATIENT)
Dept: OCCUPATIONAL THERAPY | Facility: CLINIC | Age: 54
DRG: 001 | End: 2019-03-01
Attending: INTERNAL MEDICINE
Payer: COMMERCIAL

## 2019-03-01 LAB
ANION GAP SERPL CALCULATED.3IONS-SCNC: 13 MMOL/L (ref 3–14)
ANION GAP SERPL CALCULATED.3IONS-SCNC: 14 MMOL/L (ref 3–14)
ANION GAP SERPL CALCULATED.3IONS-SCNC: 14 MMOL/L (ref 3–14)
ANION GAP SERPL CALCULATED.3IONS-SCNC: 16 MMOL/L (ref 3–14)
BASE DEFICIT BLDV-SCNC: 1.6 MMOL/L
BASE DEFICIT BLDV-SCNC: 2.4 MMOL/L
BASE DEFICIT BLDV-SCNC: 3.4 MMOL/L
BASE DEFICIT BLDV-SCNC: 3.9 MMOL/L
BASE DEFICIT BLDV-SCNC: 3.9 MMOL/L
BASE DEFICIT BLDV-SCNC: 4.3 MMOL/L
BUN SERPL-MCNC: 61 MG/DL (ref 7–30)
BUN SERPL-MCNC: 63 MG/DL (ref 7–30)
BUN SERPL-MCNC: 63 MG/DL (ref 7–30)
BUN SERPL-MCNC: 65 MG/DL (ref 7–30)
CALCIUM SERPL-MCNC: 7.8 MG/DL (ref 8.5–10.1)
CALCIUM SERPL-MCNC: 8 MG/DL (ref 8.5–10.1)
CALCIUM SERPL-MCNC: 8.3 MG/DL (ref 8.5–10.1)
CALCIUM SERPL-MCNC: 8.4 MG/DL (ref 8.5–10.1)
CHLORIDE SERPL-SCNC: 95 MMOL/L (ref 94–109)
CHLORIDE SERPL-SCNC: 97 MMOL/L (ref 94–109)
CHLORIDE SERPL-SCNC: 98 MMOL/L (ref 94–109)
CHLORIDE SERPL-SCNC: 98 MMOL/L (ref 94–109)
CO2 SERPL-SCNC: 18 MMOL/L (ref 20–32)
CO2 SERPL-SCNC: 20 MMOL/L (ref 20–32)
CO2 SERPL-SCNC: 22 MMOL/L (ref 20–32)
CO2 SERPL-SCNC: 22 MMOL/L (ref 20–32)
CREAT SERPL-MCNC: 1.8 MG/DL (ref 0.66–1.25)
CREAT SERPL-MCNC: 1.83 MG/DL (ref 0.66–1.25)
CREAT SERPL-MCNC: 1.89 MG/DL (ref 0.66–1.25)
CREAT SERPL-MCNC: 1.97 MG/DL (ref 0.66–1.25)
ERYTHROCYTE [DISTWIDTH] IN BLOOD BY AUTOMATED COUNT: 14.1 % (ref 10–15)
ERYTHROCYTE [DISTWIDTH] IN BLOOD BY AUTOMATED COUNT: 14.2 % (ref 10–15)
ERYTHROCYTE [DISTWIDTH] IN BLOOD BY AUTOMATED COUNT: 14.3 % (ref 10–15)
ERYTHROCYTE [DISTWIDTH] IN BLOOD BY AUTOMATED COUNT: 14.3 % (ref 10–15)
GFR SERPL CREATININE-BSD FRML MDRD: 37 ML/MIN/{1.73_M2}
GFR SERPL CREATININE-BSD FRML MDRD: 39 ML/MIN/{1.73_M2}
GFR SERPL CREATININE-BSD FRML MDRD: 40 ML/MIN/{1.73_M2}
GFR SERPL CREATININE-BSD FRML MDRD: 41 ML/MIN/{1.73_M2}
GLUCOSE BLDC GLUCOMTR-MCNC: 105 MG/DL (ref 70–99)
GLUCOSE BLDC GLUCOMTR-MCNC: 116 MG/DL (ref 70–99)
GLUCOSE BLDC GLUCOMTR-MCNC: 117 MG/DL (ref 70–99)
GLUCOSE BLDC GLUCOMTR-MCNC: 122 MG/DL (ref 70–99)
GLUCOSE SERPL-MCNC: 112 MG/DL (ref 70–99)
GLUCOSE SERPL-MCNC: 121 MG/DL (ref 70–99)
GLUCOSE SERPL-MCNC: 140 MG/DL (ref 70–99)
GLUCOSE SERPL-MCNC: 159 MG/DL (ref 70–99)
HCO3 BLDV-SCNC: 20 MMOL/L (ref 21–28)
HCO3 BLDV-SCNC: 21 MMOL/L (ref 21–28)
HCO3 BLDV-SCNC: 22 MMOL/L (ref 21–28)
HCO3 BLDV-SCNC: 23 MMOL/L (ref 21–28)
HCT VFR BLD AUTO: 28.3 % (ref 40–53)
HCT VFR BLD AUTO: 28.4 % (ref 40–53)
HCT VFR BLD AUTO: 29.8 % (ref 40–53)
HCT VFR BLD AUTO: 29.8 % (ref 40–53)
HGB BLD-MCNC: 9.1 G/DL (ref 13.3–17.7)
HGB BLD-MCNC: 9.4 G/DL (ref 13.3–17.7)
HGB BLD-MCNC: 9.5 G/DL (ref 13.3–17.7)
HGB BLD-MCNC: 9.7 G/DL (ref 13.3–17.7)
LMWH PPP CHRO-ACNC: 0.14 IU/ML
LMWH PPP CHRO-ACNC: 0.17 IU/ML
MAGNESIUM SERPL-MCNC: 2.8 MG/DL (ref 1.6–2.3)
MAGNESIUM SERPL-MCNC: 3.1 MG/DL (ref 1.6–2.3)
MCH RBC QN AUTO: 29.6 PG (ref 26.5–33)
MCH RBC QN AUTO: 29.7 PG (ref 26.5–33)
MCH RBC QN AUTO: 30.2 PG (ref 26.5–33)
MCH RBC QN AUTO: 30.2 PG (ref 26.5–33)
MCHC RBC AUTO-ENTMCNC: 31.9 G/DL (ref 31.5–36.5)
MCHC RBC AUTO-ENTMCNC: 32.2 G/DL (ref 31.5–36.5)
MCHC RBC AUTO-ENTMCNC: 32.6 G/DL (ref 31.5–36.5)
MCHC RBC AUTO-ENTMCNC: 33.1 G/DL (ref 31.5–36.5)
MCV RBC AUTO: 91 FL (ref 78–100)
MCV RBC AUTO: 93 FL (ref 78–100)
O2/TOTAL GAS SETTING VFR VENT: 21 %
O2/TOTAL GAS SETTING VFR VENT: ABNORMAL %
OXYHGB MFR BLDV: 50 %
OXYHGB MFR BLDV: 51 %
OXYHGB MFR BLDV: 53 %
OXYHGB MFR BLDV: 56 %
OXYHGB MFR BLDV: 58 %
OXYHGB MFR BLDV: 64 %
PCO2 BLDV: 33 MM HG (ref 40–50)
PCO2 BLDV: 33 MM HG (ref 40–50)
PCO2 BLDV: 34 MM HG (ref 40–50)
PCO2 BLDV: 34 MM HG (ref 40–50)
PCO2 BLDV: 35 MM HG (ref 40–50)
PCO2 BLDV: 38 MM HG (ref 40–50)
PH BLDV: 7.39 PH (ref 7.32–7.43)
PH BLDV: 7.41 PH (ref 7.32–7.43)
PH BLDV: 7.41 PH (ref 7.32–7.43)
PHOSPHATE SERPL-MCNC: 4 MG/DL (ref 2.5–4.5)
PLATELET # BLD AUTO: 180 10E9/L (ref 150–450)
PLATELET # BLD AUTO: 192 10E9/L (ref 150–450)
PLATELET # BLD AUTO: 206 10E9/L (ref 150–450)
PLATELET # BLD AUTO: 225 10E9/L (ref 150–450)
PO2 BLDV: 29 MM HG (ref 25–47)
PO2 BLDV: 30 MM HG (ref 25–47)
PO2 BLDV: 31 MM HG (ref 25–47)
PO2 BLDV: 33 MM HG (ref 25–47)
PO2 BLDV: 35 MM HG (ref 25–47)
PO2 BLDV: 37 MM HG (ref 25–47)
POTASSIUM SERPL-SCNC: 4 MMOL/L (ref 3.4–5.3)
POTASSIUM SERPL-SCNC: 4.1 MMOL/L (ref 3.4–5.3)
POTASSIUM SERPL-SCNC: 4.1 MMOL/L (ref 3.4–5.3)
POTASSIUM SERPL-SCNC: 4.2 MMOL/L (ref 3.4–5.3)
RBC # BLD AUTO: 3.06 10E12/L (ref 4.4–5.9)
RBC # BLD AUTO: 3.11 10E12/L (ref 4.4–5.9)
RBC # BLD AUTO: 3.21 10E12/L (ref 4.4–5.9)
RBC # BLD AUTO: 3.21 10E12/L (ref 4.4–5.9)
SODIUM SERPL-SCNC: 131 MMOL/L (ref 133–144)
SODIUM SERPL-SCNC: 131 MMOL/L (ref 133–144)
SODIUM SERPL-SCNC: 132 MMOL/L (ref 133–144)
SODIUM SERPL-SCNC: 133 MMOL/L (ref 133–144)
TROPONIN I SERPL-MCNC: 3.2 UG/L (ref 0–0.04)
WBC # BLD AUTO: 11.9 10E9/L (ref 4–11)
WBC # BLD AUTO: 13.1 10E9/L (ref 4–11)
WBC # BLD AUTO: 13.5 10E9/L (ref 4–11)
WBC # BLD AUTO: 9.4 10E9/L (ref 4–11)

## 2019-03-01 PROCEDURE — 97535 SELF CARE MNGMENT TRAINING: CPT | Mod: GO

## 2019-03-01 PROCEDURE — 25000132 ZZH RX MED GY IP 250 OP 250 PS 637: Performed by: STUDENT IN AN ORGANIZED HEALTH CARE EDUCATION/TRAINING PROGRAM

## 2019-03-01 PROCEDURE — 25000128 H RX IP 250 OP 636: Performed by: INTERNAL MEDICINE

## 2019-03-01 PROCEDURE — 93306 TTE W/DOPPLER COMPLETE: CPT | Mod: 26 | Performed by: INTERNAL MEDICINE

## 2019-03-01 PROCEDURE — 82805 BLOOD GASES W/O2 SATURATION: CPT | Performed by: PHYSICIAN ASSISTANT

## 2019-03-01 PROCEDURE — 25000125 ZZHC RX 250: Performed by: INTERNAL MEDICINE

## 2019-03-01 PROCEDURE — 82805 BLOOD GASES W/O2 SATURATION: CPT | Performed by: STUDENT IN AN ORGANIZED HEALTH CARE EDUCATION/TRAINING PROGRAM

## 2019-03-01 PROCEDURE — 93005 ELECTROCARDIOGRAM TRACING: CPT

## 2019-03-01 PROCEDURE — 25000132 ZZH RX MED GY IP 250 OP 250 PS 637: Performed by: NURSE PRACTITIONER

## 2019-03-01 PROCEDURE — 25000132 ZZH RX MED GY IP 250 OP 250 PS 637: Performed by: SURGERY

## 2019-03-01 PROCEDURE — 80048 BASIC METABOLIC PNL TOTAL CA: CPT | Performed by: STUDENT IN AN ORGANIZED HEALTH CARE EDUCATION/TRAINING PROGRAM

## 2019-03-01 PROCEDURE — 93306 TTE W/DOPPLER COMPLETE: CPT

## 2019-03-01 PROCEDURE — 85520 HEPARIN ASSAY: CPT | Performed by: STUDENT IN AN ORGANIZED HEALTH CARE EDUCATION/TRAINING PROGRAM

## 2019-03-01 PROCEDURE — 71045 X-RAY EXAM CHEST 1 VIEW: CPT

## 2019-03-01 PROCEDURE — 25000131 ZZH RX MED GY IP 250 OP 636 PS 637: Performed by: INTERNAL MEDICINE

## 2019-03-01 PROCEDURE — 82805 BLOOD GASES W/O2 SATURATION: CPT | Performed by: INTERNAL MEDICINE

## 2019-03-01 PROCEDURE — 85520 HEPARIN ASSAY: CPT | Performed by: PHYSICIAN ASSISTANT

## 2019-03-01 PROCEDURE — 40000014 ZZH STATISTIC ARTERIAL MONITORING DAILY

## 2019-03-01 PROCEDURE — 00000146 ZZHCL STATISTIC GLUCOSE BY METER IP

## 2019-03-01 PROCEDURE — 93010 ELECTROCARDIOGRAM REPORT: CPT | Performed by: INTERNAL MEDICINE

## 2019-03-01 PROCEDURE — 83735 ASSAY OF MAGNESIUM: CPT | Performed by: PHYSICIAN ASSISTANT

## 2019-03-01 PROCEDURE — 25000132 ZZH RX MED GY IP 250 OP 250 PS 637: Performed by: INTERNAL MEDICINE

## 2019-03-01 PROCEDURE — 84100 ASSAY OF PHOSPHORUS: CPT | Performed by: PHYSICIAN ASSISTANT

## 2019-03-01 PROCEDURE — 97116 GAIT TRAINING THERAPY: CPT | Mod: GP

## 2019-03-01 PROCEDURE — 85027 COMPLETE CBC AUTOMATED: CPT | Performed by: STUDENT IN AN ORGANIZED HEALTH CARE EDUCATION/TRAINING PROGRAM

## 2019-03-01 PROCEDURE — 25000125 ZZHC RX 250: Performed by: STUDENT IN AN ORGANIZED HEALTH CARE EDUCATION/TRAINING PROGRAM

## 2019-03-01 PROCEDURE — 40000048 ZZH STATISTIC DAILY SWAN MONITORING

## 2019-03-01 PROCEDURE — 25000132 ZZH RX MED GY IP 250 OP 250 PS 637: Performed by: ANESTHESIOLOGY

## 2019-03-01 PROCEDURE — 85027 COMPLETE CBC AUTOMATED: CPT | Performed by: PHYSICIAN ASSISTANT

## 2019-03-01 PROCEDURE — 20000004 ZZH R&B ICU UMMC

## 2019-03-01 PROCEDURE — 80048 BASIC METABOLIC PNL TOTAL CA: CPT | Performed by: PHYSICIAN ASSISTANT

## 2019-03-01 PROCEDURE — 25800030 ZZH RX IP 258 OP 636: Performed by: STUDENT IN AN ORGANIZED HEALTH CARE EDUCATION/TRAINING PROGRAM

## 2019-03-01 PROCEDURE — 40000275 ZZH STATISTIC RCP TIME EA 10 MIN

## 2019-03-01 PROCEDURE — 97530 THERAPEUTIC ACTIVITIES: CPT | Mod: GP

## 2019-03-01 PROCEDURE — 84484 ASSAY OF TROPONIN QUANT: CPT | Performed by: STUDENT IN AN ORGANIZED HEALTH CARE EDUCATION/TRAINING PROGRAM

## 2019-03-01 PROCEDURE — 99233 SBSQ HOSP IP/OBS HIGH 50: CPT | Mod: 25 | Performed by: INTERNAL MEDICINE

## 2019-03-01 PROCEDURE — 25000128 H RX IP 250 OP 636: Performed by: STUDENT IN AN ORGANIZED HEALTH CARE EDUCATION/TRAINING PROGRAM

## 2019-03-01 PROCEDURE — 40000196 ZZH STATISTIC RAPCV CVP MONITORING

## 2019-03-01 PROCEDURE — 84484 ASSAY OF TROPONIN QUANT: CPT | Performed by: SURGERY

## 2019-03-01 RX ORDER — SPIRONOLACTONE 25 MG/1
25 TABLET ORAL DAILY
Status: ON HOLD | COMMUNITY
End: 2019-04-01

## 2019-03-01 RX ORDER — DOBUTAMINE HCL IN DEXTROSE 5 % 500MG/250
2.5 INTRAVENOUS SOLUTION INTRAVENOUS CONTINUOUS
Status: DISCONTINUED | OUTPATIENT
Start: 2019-03-01 | End: 2019-03-01

## 2019-03-01 RX ORDER — VALGANCICLOVIR 450 MG/1
450 TABLET, FILM COATED ORAL EVERY OTHER DAY
Status: DISCONTINUED | OUTPATIENT
Start: 2019-03-01 | End: 2019-03-05

## 2019-03-01 RX ORDER — DOBUTAMINE HYDROCHLORIDE 200 MG/100ML
2.5 INJECTION INTRAVENOUS CONTINUOUS
Status: DISPENSED | OUTPATIENT
Start: 2019-03-01 | End: 2019-03-06

## 2019-03-01 RX ORDER — LANOLIN ALCOHOL/MO/W.PET/CERES
3 CREAM (GRAM) TOPICAL AT BEDTIME
Status: DISCONTINUED | OUTPATIENT
Start: 2019-03-01 | End: 2019-03-25

## 2019-03-01 RX ORDER — BUMETANIDE 0.25 MG/ML
2 INJECTION INTRAMUSCULAR; INTRAVENOUS ONCE
Status: COMPLETED | OUTPATIENT
Start: 2019-03-01 | End: 2019-03-01

## 2019-03-01 RX ORDER — BUMETANIDE 0.25 MG/ML
4 INJECTION INTRAMUSCULAR; INTRAVENOUS ONCE
Status: COMPLETED | OUTPATIENT
Start: 2019-03-01 | End: 2019-03-01

## 2019-03-01 RX ORDER — ROSUVASTATIN CALCIUM 10 MG/1
10 TABLET, COATED ORAL DAILY
Status: DISCONTINUED | OUTPATIENT
Start: 2019-03-01 | End: 2019-04-03 | Stop reason: HOSPADM

## 2019-03-01 RX ADMIN — HYDRALAZINE HYDROCHLORIDE 75 MG: 25 TABLET ORAL at 06:23

## 2019-03-01 RX ADMIN — OXYCODONE HYDROCHLORIDE 5 MG: 5 TABLET ORAL at 23:48

## 2019-03-01 RX ADMIN — BUMETANIDE 4 MG: 0.25 INJECTION INTRAMUSCULAR; INTRAVENOUS at 21:22

## 2019-03-01 RX ADMIN — OXYCODONE HYDROCHLORIDE 5 MG: 5 TABLET ORAL at 12:17

## 2019-03-01 RX ADMIN — HYDRALAZINE HYDROCHLORIDE 75 MG: 25 TABLET ORAL at 21:17

## 2019-03-01 RX ADMIN — DULOXETINE 20 MG: 20 CAPSULE, DELAYED RELEASE ORAL at 07:58

## 2019-03-01 RX ADMIN — MONTELUKAST SODIUM 10 MG: 10 TABLET, COATED ORAL at 21:17

## 2019-03-01 RX ADMIN — ACETAMINOPHEN 650 MG: 325 TABLET, FILM COATED ORAL at 21:17

## 2019-03-01 RX ADMIN — TERBUTALINE SULFATE 10 MG: 5 TABLET ORAL at 08:02

## 2019-03-01 RX ADMIN — HYDRALAZINE HYDROCHLORIDE 75 MG: 25 TABLET ORAL at 13:52

## 2019-03-01 RX ADMIN — PREDNISONE 20 MG: 10 TABLET ORAL at 07:58

## 2019-03-01 RX ADMIN — HEPARIN SODIUM 1100 UNITS/HR: 10000 INJECTION, SOLUTION INTRAVENOUS at 00:43

## 2019-03-01 RX ADMIN — OXYCODONE HYDROCHLORIDE 5 MG: 5 TABLET ORAL at 07:58

## 2019-03-01 RX ADMIN — ROSUVASTATIN CALCIUM 10 MG: 10 TABLET, FILM COATED ORAL at 15:53

## 2019-03-01 RX ADMIN — LIDOCAINE 1 PATCH: 560 PATCH PERCUTANEOUS; TOPICAL; TRANSDERMAL at 07:58

## 2019-03-01 RX ADMIN — ACETAMINOPHEN 650 MG: 325 TABLET, FILM COATED ORAL at 16:44

## 2019-03-01 RX ADMIN — TERBUTALINE SULFATE 10 MG: 5 TABLET ORAL at 23:48

## 2019-03-01 RX ADMIN — MESALAMINE 2400 MG: 800 TABLET, DELAYED RELEASE ORAL at 08:02

## 2019-03-01 RX ADMIN — TERBUTALINE SULFATE 10 MG: 5 TABLET ORAL at 00:17

## 2019-03-01 RX ADMIN — MELATONIN TAB 3 MG 3 MG: 3 TAB at 21:17

## 2019-03-01 RX ADMIN — TACROLIMUS 1 MG: 1 CAPSULE ORAL at 17:56

## 2019-03-01 RX ADMIN — MESALAMINE 2400 MG: 800 TABLET, DELAYED RELEASE ORAL at 19:47

## 2019-03-01 RX ADMIN — FLUTICASONE PROPIONATE 2 PUFF: 220 AEROSOL, METERED RESPIRATORY (INHALATION) at 08:01

## 2019-03-01 RX ADMIN — ACETAMINOPHEN 650 MG: 325 TABLET, FILM COATED ORAL at 04:13

## 2019-03-01 RX ADMIN — FLUTICASONE PROPIONATE 2 PUFF: 220 AEROSOL, METERED RESPIRATORY (INHALATION) at 19:47

## 2019-03-01 RX ADMIN — TERBUTALINE SULFATE 10 MG: 5 TABLET ORAL at 15:55

## 2019-03-01 RX ADMIN — SENNOSIDES AND DOCUSATE SODIUM 2 TABLET: 8.6; 5 TABLET ORAL at 07:58

## 2019-03-01 RX ADMIN — Medication 500000 UNITS: at 19:47

## 2019-03-01 RX ADMIN — DOBUTAMINE HYDROCHLORIDE 2.5 MCG/KG/MIN: 200 INJECTION INTRAVENOUS at 16:55

## 2019-03-01 RX ADMIN — MYCOPHENOLATE MOFETIL 1500 MG: 500 TABLET ORAL at 17:56

## 2019-03-01 RX ADMIN — ISOPROTERENOL HYDROCHLORIDE 0.03 MCG/KG/MIN: 0.2 INJECTION, SOLUTION INTRAMUSCULAR; INTRAVENOUS at 13:24

## 2019-03-01 RX ADMIN — LEVOTHYROXINE SODIUM 100 MCG: 100 TABLET ORAL at 07:58

## 2019-03-01 RX ADMIN — Medication 500000 UNITS: at 12:08

## 2019-03-01 RX ADMIN — PANTOPRAZOLE SODIUM 40 MG: 40 TABLET, DELAYED RELEASE ORAL at 08:04

## 2019-03-01 RX ADMIN — BUMETANIDE 2 MG: 0.25 INJECTION INTRAMUSCULAR; INTRAVENOUS at 15:54

## 2019-03-01 RX ADMIN — TACROLIMUS 1 MG: 1 CAPSULE ORAL at 07:58

## 2019-03-01 RX ADMIN — PREDNISONE 20 MG: 10 TABLET ORAL at 17:56

## 2019-03-01 RX ADMIN — OXYCODONE HYDROCHLORIDE 5 MG: 5 TABLET ORAL at 21:17

## 2019-03-01 RX ADMIN — OXYCODONE HYDROCHLORIDE 5 MG: 5 TABLET ORAL at 04:13

## 2019-03-01 RX ADMIN — Medication 500000 UNITS: at 15:54

## 2019-03-01 RX ADMIN — MYCOPHENOLATE MOFETIL 1500 MG: 500 TABLET ORAL at 07:58

## 2019-03-01 RX ADMIN — VALGANCICLOVIR 450 MG: 450 TABLET, FILM COATED ORAL at 15:53

## 2019-03-01 RX ADMIN — ACETAMINOPHEN 650 MG: 325 TABLET, FILM COATED ORAL at 12:17

## 2019-03-01 RX ADMIN — Medication 500000 UNITS: at 08:01

## 2019-03-01 RX ADMIN — OXYCODONE HYDROCHLORIDE 5 MG: 5 TABLET ORAL at 16:44

## 2019-03-01 RX ADMIN — ISOPROTERENOL HYDROCHLORIDE 0.03 MCG/KG/MIN: 0.2 INJECTION, SOLUTION INTRAMUSCULAR; INTRAVENOUS at 00:43

## 2019-03-01 ASSESSMENT — ACTIVITIES OF DAILY LIVING (ADL)
ADLS_ACUITY_SCORE: 11

## 2019-03-01 ASSESSMENT — PAIN DESCRIPTION - DESCRIPTORS
DESCRIPTORS: CONSTANT;DISCOMFORT
DESCRIPTORS: CONSTANT;DISCOMFORT
DESCRIPTORS: ACHING
DESCRIPTORS: CONSTANT;DISCOMFORT
DESCRIPTORS: ACHING;DISCOMFORT
DESCRIPTORS: DISCOMFORT;ACHING
DESCRIPTORS: ACHING;DISCOMFORT

## 2019-03-01 ASSESSMENT — MIFFLIN-ST. JEOR: SCORE: 1426.5

## 2019-03-01 NOTE — PROGRESS NOTES
ADVANCED HEART FAILURE PROGRESS NOTE    SUBJECTIVE:  No acute events. Having some chest wall soreness but otherwise feeling well.    ROS otherwise negative.    OBJECTIVE:  Vital signs:  Temp: 97.6  F (36.4  C) Temp  Min: 97.4  F (36.3  C)  Max: 97.7  F (36.5  C)  Heart Rate: 98 Heart Rate  Min: 94  Max: 98    No data recorded.  No data recorded.    Resp: 16 Resp  Min: 14  Max: 26  SpO2: 99 % SpO2  Min: 95 %  Max: 100 %    [place]: RA 22, PA 48/20, PCW 16, PA sat 64%, CI 2.9      Intake/Output Summary (Last 24 hours) at 3/1/2019 0659  Last data filed at 3/1/2019 0600  Gross per 24 hour   Intake 2227.24 ml   Output 1685 ml   Net 542.24 ml       Vitals:    02/27/19 0400 02/28/19 0500 03/01/19 0400   Weight: 59.9 kg (132 lb 0.9 oz) 61.1 kg (134 lb 11.2 oz) 61.7 kg (136 lb 0.4 oz)       Physical Exam:  GEN:  Alert, interactive, appears comfortable, NAD.  HEENT:  [place] R internal jugular   CV:  Regular rate and rhythm, no murmur or JVD.   CHEST: Subclavian incision sutured.  Chest tubes.  LUNGS:  On room air, normal work of breathing, Clear to auscultation bilaterally, no wheezes or crackles.   ABD:  Active bowel sounds, soft, non-tender/non-distended.  No rebound/guarding/rigidity.  EXT:  No edema or cyanosis.  Hands/feet warm to touch with good signs of peripheral perfusion.   NEURO:  Alert and oriented, no new focal deficits appreciated.  PSCYH: Normal mood and affect      Medications:    EPINEPHrine IV infusion ADULT Stopped (02/28/19 2128)     HEParin 1,100 Units/hr (03/01/19 0700)     isoproterenol (ISUPREL) infusion ADULT 0.03 mcg/kg/min (03/01/19 0700)     - MEDICATION INSTRUCTIONS -       - MEDICATION INSTRUCTIONS -       BETA BLOCKER NOT PRESCRIBED         Current Facility-Administered Medications   Medication Dose Route Frequency     bisacodyl  10 mg Rectal Daily     DULoxetine  20 mg Oral Daily     fluticasone  2 puff Inhalation Q12H     heparin lock flush  5 mL Intravenous Q24H     hydrALAZINE  75 mg Oral Q8H SHERRIE      insulin aspart  1-7 Units Subcutaneous TID AC     insulin aspart  1-5 Units Subcutaneous At Bedtime     levothyroxine  100 mcg Oral Daily     lidocaine  1 patch Transdermal Q24H     lidocaine   Transdermal Q8H     lidocaine   Transdermal Q24h     mesalamine  2,400 mg Oral BID     montelukast  10 mg Oral At Bedtime     mycophenolate  1,500 mg Oral BID IS     nystatin  500,000 Units Oral 4x Daily     pantoprazole  40 mg Oral QAM AC     polyethylene glycol  17 g Oral Daily     predniSONE  20 mg Oral BID w/meals     senna-docusate  1 tablet Oral BID    Or     senna-docusate  2 tablet Oral BID     sodium chloride (PF)  3 mL Intracatheter Q8H     tacrolimus  1 mg Oral BID IS     terbutaline  10 mg Oral Q8H         Labs:  CMP  Recent Labs   Lab 03/01/19 0358 02/28/19 2009 02/28/19  1552  02/25/19  1502  02/24/19  1817    134 137   < > 142   < > 144   POTASSIUM 4.1 4.3 3.6   < > 4.5   < > 3.7   CHLORIDE 97 102 108   < > 107   < > 106   CO2 22 21 19*   < > 24   < > 23   BUN 63* 57* 50*   < > 38*   < > 27   CR 1.83* 1.49* 1.20   < > 1.48*   < > 1.06   RADAMES 8.4* 7.4* 6.7*   < > 8.9   < > 8.4*   MAG 3.1*  --  2.6*   < > 3.0*   < > 3.0*   PHOS 4.0  --  2.7   < > 6.8*  --  2.7   * 173* 117*   < > 120*   < > 130*   LDH  --   --   --   --   --   --  536*   ALKPHOS  --   --   --   --  89  --  96   BILITOTAL  --   --   --   --  1.4*  --  2.7*   AST  --   --   --   --  110*  --  92*   ALT  --   --   --   --  28  --  24    < > = values in this interval not displayed.     BLOOD GAS  Recent Labs   Lab 02/25/19  1717 02/25/19  1502   PH 7.32* 7.31*   PCO2 36 51*   PO2 110* 75*     CBC  Recent Labs   Lab 03/01/19 0358 02/28/19 2009   WBC 9.4 11.3*   HGB 9.1* 9.5*   HCT 28.3* 30.1*    175     COAG  Recent Labs   Lab 02/27/19  0421 02/25/19  1502 02/24/19  1817   INR  --  1.29* 1.50*   PTT 38* 32 55*         ASSESSMENT/PLAN:  Everton Larios is a 52 yo gentleman who underwent orthotopic heart transplant 2/24/19 for non  ischemic cardiomyopathy and cardiogenic shock.     #S/p OHT on 2/24:  Graft function:  - BP stable, on hydralazine 75mg TID, off nitroprusside  - will start dobutamine 2.5 given increased filling pressures and decreased CO off of epi  - continue isuprel for HR support, now on terbutaline 15 q8h  - diuresis with bumex 2mg IV  - echo today with LVEF 60%, RV mildly dilated and hypokinetic, flattened septum     Immunosuppression:  - on MMF 1500 BID  - s/p methylpred 125 x 3 doses, prednisone taper until 20mg BID  - NOT Simulect protocol, on tacro 1 BID, check level      Prophylaxis:  - CMV +/+: medium risk, start renal dose Valcyte  - PCP: holding Bactrim for now  - Thrush: start nystatin  - GI: on PPI  - CAV: start Crestor, holding ASA given bleeding at line sites     Biopsies:  - First biopsy on approximately 3/4     Goretex conduit for persistent left SVC:  - will need anticoagulation, advance heparin to low intensity        Seen and staffed with Dr. Young.    Trev Harris MD  Advanced Heart Failure Fellow  [phone]

## 2019-03-01 NOTE — PROGRESS NOTES
CVTS PROGRESS NOTE  March 1, 2019      CO-MORBIDITIES:   Chronic systolic heart failure (H)  (primary encounter diagnosis)  Acute on chronic systolic congestive heart failure (H)    ASSESSMENT: Everton Larios is a 53 year old male with PMH etoh abuse, hypothyroidism, intermittent asthma, ulcerative colitis, Depression, Chronic HFrEF, NICM admitted with cardiogenic shock requiring subclavian balloon pump now s/p OHT 2/24/19 with Piyush House and Christopher.     TODAY'S PROGRESS:   - Echo per cards today  - Start statin; hold asa for now given some bleeding from lines  - Hydralazine increased to 75mg q8h   - Dobutamine gtt added today given increased filling pressures and decreased CO off epi gtt  - Bumex for further diuresis     PLAN:  Neuro/ pain/ sedation:  - Monitor neurological status. Notify the MD for any acute changes in exam.  - Tylenol, oxycodone and PRN fentanyl     #Depression  PTA cymbalta 20mg     Pulmonary care:   - 2l NC  - Supp oxygen to maintain SpO2 >92%  - Encourage IS and deep breathing    #Intermittent asthma  - PTA Montelukast ordered    Cardiovascular:  HR goal >95  - Monitor hemodynamic status.   - MAP >65. HR goal > 95.  - Epi gtt off. Increased filling pressures and decreased CO after this. Will start dobutamine gtt.  - Hydralazine 75mg q8h  - Terbutaline 10mg q8h   - Holding asa (some bleeding around lines)   - Start statin today    GI care:   #constipation  - Regular diet  - Senna, miralax and suppository for bowel regimen; MoM and miralax prn as well    #Intraperitoneal free air (CT obtained 2/26)  - Small peritoneal defect made during redo operation, closed with stitches intra-op- likely source of free air. No peritoneal signs, pain improved.     #Pierce's esophagus  #Ulcerative colitis  -PTA Mesalamine     Fluids/ Electrolytes/ Nutrition:   - TKO for IV fluid hydration  - No indication for parenteral nutrition.    Renal/ Fluid Balance:    - Urine output is adequate so far.  - Will  continue to monitor intake and output.  - Bumex for diuresis.   - PTA Tamsulosin      Endocrine:    # Stress hyperglycemia  - sliding scale insulin as needed  # hypothyroidism  - Home levothyroxine      ID/ Antibiotics:  - Perioperative antibiotics.   - Vancomycin and zosyn x 3 days- completed.  - Valcyte for CMV prophylaxis; holding bactrim for PCP prophylaxis.  - Nystatin for thrush     Heme:     #Bypass of persistent left SVC to R atrial appendage with [place]-Davis graft. Will require lifelong anti-coagulation for graft  - Hgb goal >7. Daily CBC.  - No active concern for bleeding.  - Low intensity heparin today     Prophylaxis:    - Mechanical prophylaxis for DVT  - Heparin as above  - Protonix qd     Lines/ tubes/ drains:  - RIJ, [place], Arterial line, med/pleural chest tubes     Disposition:  - [facility] ICU.     Patient seen, findings and plan discussed with CVTS fellow.    Leyla Bowling MD  General Surgery PGY-3  ====================================    SUBJECTIVE:   Pain control adequate  Having bowel movements  Diuresing; voiding after maddox removed  Ambulating with therapies    OBJECTIVE:   1. VITAL SIGNS:   Temp:  [96.7  F (35.9  C)-97.6  F (36.4  C)] 97.1  F (36.2  C)  Heart Rate:  [] 101  Resp:  [14-27] 19  MAP:  [72 mmHg-102 mmHg] 87 mmHg  Arterial Line BP: ()/(47-85) 126/74  SpO2:  [96 %-100 %] 98 %  Resp: 19      2. INTAKE/ OUTPUT:   I/O last 3 completed shifts:  In: 1897.6 [P.O.:1280; I.V.:617.6]  Out: 1940 [Urine:1260; Chest Tube:680]    3. PHYSICAL EXAMINATION:     General: very pleasant, appropriately interactive   Neuro: Alert and   Resp: CTAB on 2L oxygen  CV: Regular rate, regular rhythm, serosanguinous output from chest tubes  Abdomen: Soft, Non-distended, non-tender  Incisions: c/d/i  Extremities: warm and well perfused    4. INVESTIGATIONS:   Arterial Blood Gases   Recent Labs   Lab 02/25/19  1717 02/25/19  1502 02/25/19  0918 02/25/19  0350   PH 7.32* 7.31* 7.33* 7.39   PCO2 36 51* 46* 42    PO2 110* 75* 125* 87   HCO3 19* 26 24 25     Complete Blood Count   Recent Labs   Lab 03/01/19  1648 03/01/19  1034 03/01/19  0358 02/28/19 2009   WBC 13.5* 13.1* 9.4 11.3*   HGB 9.5* 9.7* 9.1* 9.5*    206 180 175     Basic Metabolic Panel  Recent Labs   Lab 03/01/19  1648 03/01/19  1034 03/01/19  0358 02/28/19 2009   * 131* 133 134   POTASSIUM 4.0 4.2 4.1 4.3   CHLORIDE 98 95 97 102   CO2 20 22 22 21   BUN 63* 61* 63* 57*   CR 1.80* 1.89* 1.83* 1.49*   * 159* 121* 173*     Liver Function Tests  Recent Labs   Lab 02/25/19  1502 02/24/19  1817 02/24/19  1556 02/24/19  0340 02/23/19  1835   * 92*  --   --  21   ALT 28 24  --   --  15   ALKPHOS 89 96  --   --  169*   BILITOTAL 1.4* 2.7*  --   --  1.6*   ALBUMIN 3.4 2.4*  --   --  3.4   INR 1.29* 1.50* 1.57* 1.19* 1.20*     Pancreatic Enzymes  Recent Labs   Lab 02/23/19  1835   AMYLASE 22*     Coagulation Profile  Recent Labs   Lab 02/27/19  0421 02/25/19  1502 02/24/19  1817 02/24/19  1556 02/24/19  0340   INR  --  1.29* 1.50* 1.57* 1.19*   PTT 38* 32 55* 38*  --          5. RADIOLOGY:   Recent Results (from the past 24 hour(s))   XR Chest Port 1 View    Narrative    Exam: XR CHEST PORT 1 VW, 3/1/2019 1:15 AM    Indication: confirm PA catheter position    Comparison: 2/28/2019    Findings:   AP view of the chest. Right IJ [place]-Liz catheter the tip in the right  main pulmonary artery. Mediastinal and bilateral chest drains are  unchanged. Intact median sternotomy wires and mediastinal clips.    Cardiac silhouette is unchanged. No new focal airspace consolidation,  pleural effusion or pneumothorax. Unchanged area of atelectasis in the  left lung base. Interval resolution of air under the diaphragm. Upper  abdomen is otherwise unremarkable.      Impression    Impression:   1. Pulmonary artery catheter the tip in the right main pulmonary  artery. Additional support devices and postsurgical changes as  detailed above.  2. Stable area of left  basilar atelectasis. No new focal  consolidation.  3. Interval resolution of pneumoperitoneum.    I have personally reviewed the examination and initial interpretation  and I agree with the findings.    ZAC MIRZA MD       =========================================

## 2019-03-01 NOTE — PROGRESS NOTES
CV ICU PROGRESS NOTE  March 1, 2019      CO-MORBIDITIES:   Chronic systolic heart failure (H)  (primary encounter diagnosis)  Acute on chronic systolic congestive heart failure (H)    ASSESSMENT: Everton Larios is a 53 year old male with PMH etoh abuse, hypothyroidism, intermittent asthma, ulcerative colitis, Depression, Chronic HFrEF, NICM admitted with cardiogenic shock requiring subclavian balloon pump now s/p OHT 2/24/19 with Piyush House and Christopher.     TODAY'S PROGRESS:   - Echo per cards today  - Start statin; hold asa for now given some bleeding from lines  - Hydralazine increased to 75mg q8h   - Dobutamine gtt added today given increased filling pressures and decreased CO off epi gtt  - Bumex for further diuresis     PLAN:  Neuro/ pain/ sedation:  - Monitor neurological status. Notify the MD for any acute changes in exam.  - Tylenol, oxycodone and PRN fentanyl     #Depression  PTA cymbalta 20mg     Pulmonary care:   - 2l NC  - Supp oxygen to maintain SpO2 >92%  - Encourage IS and deep breathing    #Intermittent asthma  - PTA Montelukast ordered    Cardiovascular:  HR goal >95  - Monitor hemodynamic status.   - MAP >65. HR goal > 95.  - Epi gtt off. Increased filling pressures and decreased CO after this. Will start dobutamine gtt.  - Hydralazine 75mg q8h  - Terbutaline 10mg q8h   - Holding asa (some bleeding around lines)   - Start statin today    GI care:   #constipation  - Regular diet  - Senna, miralax and suppository for bowel regimen; MoM and miralax prn as well    #Intraperitoneal free air (CT obtained 2/26)  - Small peritoneal defect made during redo operation, closed with stitches intra-op- likely source of free air. No peritoneal signs, pain improved.     #Pierce's esophagus  #Ulcerative colitis  -PTA Mesalamine     Fluids/ Electrolytes/ Nutrition:   - TKO for IV fluid hydration  - No indication for parenteral nutrition.    Renal/ Fluid Balance:    - Urine output is adequate so far.  - Will  continue to monitor intake and output.  - Bumex for diuresis.   - PTA Tamsulosin      Endocrine:    # Stress hyperglycemia  - sliding scale insulin as needed  # hypothyroidism  - Home levothyroxine      ID/ Antibiotics:  - Perioperative antibiotics.   - Vancomycin and zosyn x 3 days- completed.  - Valcyte for CMV prophylaxis; holding bactrim for PCP prophylaxis.  - Nystatin for thrush     Heme:     #Bypass of persistent left SVC to R atrial appendage with [place] graft. Will require lifelong anti-coagulation for graft  - Hgb goal >7. Daily CBC.  - No active concern for bleeding.  - Low intensity heparin today     Prophylaxis:    - Mechanical prophylaxis for DVT  - Heparin as above  - Protonix qd     Lines/ tubes/ drains:  - RIJ, [place], Arterial line, med/pleural chest tubes     Disposition:  - [facility] ICU.     Patient seen, findings and plan discussed with CV ICU staff, Dr. Mederos.    Leyla Bowling MD  General Surgery PGY-3  ====================================    SUBJECTIVE:   Pain control adequate  Having bowel movements  Diuresing; voiding after maddox removed  Ambulating with therapies    OBJECTIVE:   1. VITAL SIGNS:   Temp:  [96.7  F (35.9  C)-97.6  F (36.4  C)] 97.1  F (36.2  C)  Heart Rate:  [] 101  Resp:  [14-27] 19  MAP:  [72 mmHg-102 mmHg] 87 mmHg  Arterial Line BP: ()/(47-85) 126/74  SpO2:  [96 %-100 %] 98 %  Resp: 19      2. INTAKE/ OUTPUT:   I/O last 3 completed shifts:  In: 1897.6 [P.O.:1280; I.V.:617.6]  Out: 1940 [Urine:1260; Chest Tube:680]    3. PHYSICAL EXAMINATION:     General: very pleasant, appropriately interactive   Neuro: Alert and   Resp: CTAB on 2L oxygen  CV: Regular rate, regular rhythm, serosanguinous output from chest tubes  Abdomen: Soft, Non-distended, non-tender  Incisions: c/d/i  Extremities: warm and well perfused    4. INVESTIGATIONS:   Arterial Blood Gases   Recent Labs   Lab 02/25/19  1717 02/25/19  1502 02/25/19  0918 02/25/19  0350   PH 7.32* 7.31* 7.33* 7.39   PCO2 36  51* 46* 42   PO2 110* 75* 125* 87   HCO3 19* 26 24 25     Complete Blood Count   Recent Labs   Lab 03/01/19  1648 03/01/19  1034 03/01/19  0358 02/28/19 2009   WBC 13.5* 13.1* 9.4 11.3*   HGB 9.5* 9.7* 9.1* 9.5*    206 180 175     Basic Metabolic Panel  Recent Labs   Lab 03/01/19  1648 03/01/19  1034 03/01/19  0358 02/28/19 2009   * 131* 133 134   POTASSIUM 4.0 4.2 4.1 4.3   CHLORIDE 98 95 97 102   CO2 20 22 22 21   BUN 63* 61* 63* 57*   CR 1.80* 1.89* 1.83* 1.49*   * 159* 121* 173*     Liver Function Tests  Recent Labs   Lab 02/25/19  1502 02/24/19  1817 02/24/19  1556 02/24/19  0340 02/23/19  1835   * 92*  --   --  21   ALT 28 24  --   --  15   ALKPHOS 89 96  --   --  169*   BILITOTAL 1.4* 2.7*  --   --  1.6*   ALBUMIN 3.4 2.4*  --   --  3.4   INR 1.29* 1.50* 1.57* 1.19* 1.20*     Pancreatic Enzymes  Recent Labs   Lab 02/23/19  1835   AMYLASE 22*     Coagulation Profile  Recent Labs   Lab 02/27/19  0421 02/25/19  1502 02/24/19  1817 02/24/19  1556 02/24/19  0340   INR  --  1.29* 1.50* 1.57* 1.19*   PTT 38* 32 55* 38*  --          5. RADIOLOGY:   Recent Results (from the past 24 hour(s))   XR Chest Port 1 View    Narrative    Exam: XR CHEST PORT 1 VW, 3/1/2019 1:15 AM    Indication: confirm PA catheter position    Comparison: 2/28/2019    Findings:   AP view of the chest. Right IJ [place]-Liz catheter the tip in the right  main pulmonary artery. Mediastinal and bilateral chest drains are  unchanged. Intact median sternotomy wires and mediastinal clips.    Cardiac silhouette is unchanged. No new focal airspace consolidation,  pleural effusion or pneumothorax. Unchanged area of atelectasis in the  left lung base. Interval resolution of air under the diaphragm. Upper  abdomen is otherwise unremarkable.      Impression    Impression:   1. Pulmonary artery catheter the tip in the right main pulmonary  artery. Additional support devices and postsurgical changes as  detailed above.  2. Stable  area of left basilar atelectasis. No new focal  consolidation.  3. Interval resolution of pneumoperitoneum.    I have personally reviewed the examination and initial interpretation  and I agree with the findings.    ZAC MIRZA MD       =========================================

## 2019-03-01 NOTE — PLAN OF CARE
Discharge Planner OT   Patient plan for discharge: Not discussed this session.   Current status: Pt supine inclined in bed upon arrival. Therapist educated pt on sternal precautions and discussed safety with functional transfers. Pt required Min A and Max vc's for safety for transfer supine<->seated EOB. Pt required CGA/Min A and vc's for transfer sit<->standing pivot transfer to bedside chair. Therapist provided pt with CR folder for heart transplant. Pt completed the MoCA 8.1 scoring 27/30 with a memory index score of 12/15. Therapist educated pt on results of the cognitive screen. VSS. RN present for transfers with [facility].   Barriers to return to prior living situation: Decreased strength and activity tolerance, Fatigue, Sternal precautions, Decreased independence with functional transfers/ADLs.   Recommendations for discharge: Anticipate pt will be able to discharge Home with A and OP CR pending continued progression in IP therapy.   Rationale for recommendations: Pt will benefit from continued IP CR to progress independence and safety with functional transfers/ADLs and to maximize cardiovascular endurance/strength.        Entered by: Porfirio Law 03/01/2019 4:57 PM

## 2019-03-01 NOTE — PLAN OF CARE
53 y.o. M s/p OHT 02/24  Neuro: A&Ox 4, Intact.  CV: HR 95-98 with isoprel gtt at 0.03. SR, no ectopy noted. HTN at times, hydralazine increased. JOSEPHINE q4hr, see flowsheet for numbers. CT continuing to drain serosang.   Resp: LS clear/diminished, frequent productive cough. 2 Lpm overnight for comfort. Pt MATA.   GI: PM bowel meds held r/t diarrhea.   : x1 dose diuril overnight, for CVP 20. -500 mL so far this morning.   Plan: Continue to monitor hemodynamics. Address concerns with CVTS/CARDs 2.

## 2019-03-01 NOTE — PHARMACY-ADMISSION MEDICATION HISTORY
Admission medication history interview status for the 2/17/2019 admission is complete. See Epic admission navigator for allergy information, pharmacy, prior to admission medications and immunization status.     Medication history interview sources:  Patient    Changes made to PTA medication list (reason)  Added: Vitamin D2 (dose unknown)  Deleted: Azelastine (ineffective, high cost so pt decided to stop using)  Changed: levothyroxine (updated tablet strength), spironolactone (updated dose), warfarin (updated most recent maintenance dose)    Additional medication history information (including reliability of information, actions taken by pharmacist):  -Pt is an excellent historian  -Warfarin managed by [facility] [facility] [employer], with most recent maintenance dose being 2.5 mg on Sun/Thurs and 5 mg RoW.   -Pt mentioned that he will switch from mesalamine to another medication (balsalazide?) upon discharge d/t cost concerns      Prior to Admission medications    Medication Sig Last Dose Taking? Auth Provider   albuterol (PROAIR HFA/PROVENTIL HFA/VENTOLIN HFA) 108 (90 Base) MCG/ACT inhaler Inhale 2 puffs into the lungs every 6 hours as needed for shortness of breath / dyspnea or wheezing Past Month at Unknown time Yes    Alpha-D-Galactosidase (BEANO PO) Take 2 tablets by mouth 3 times daily  Past Month at Unknown time Yes Reported, Patient   amiodarone (PACERONE/CODARONE) 200 MG tablet Take 1 tablet (200 mg) by mouth daily Past Month at Unknown time Yes    DULoxetine (CYMBALTA) 20 MG EC capsule Take 20 mg by mouth daily 3/1/2019 at Unknown time Yes Unknown, Entered By History   Ergocalciferol (VITAMIN D2 PO)  Past Month at Unknown time Yes Unknown, Entered By History   fluticasone (FLOVENT HFA) 220 MCG/ACT Inhaler Inhale 2 puffs into the lungs 2 times daily 3/1/2019 at Unknown time Yes Unknown, Entered By History   furosemide (LASIX) 20 MG tablet Take 2 tablets (40 mg) by mouth 2 times daily 2/28/2019 at Unknown time Yes Corinna Donis  MD Miguel   levothyroxine (SYNTHROID/LEVOTHROID) 100 MCG tablet Take 100 mcg by mouth daily  3/1/2019 at Unknown time Yes Unknown, Entered By History   mesalamine (ASACOL HD) 800 MG EC tablet Take 3 tablets (2,400 mg) by mouth 2 times daily 3/1/2019 at Unknown time Yes    milrinone (PRIMACOR) infusion 200 mcg/mL PREMIX Inject 6.6375 mcg/min into the vein continuous Past Month at Unknown time Yes Luis Carlos Templeton MD   montelukast (SINGULAIR) 10 MG tablet Take 1 tablet (10 mg) by mouth At Bedtime 3/1/2019 at Unknown time Yes    omeprazole (PRILOSEC) 40 MG capsule Take 1 capsule (40 mg) by mouth daily In pm Past Month at Unknown time Yes    ondansetron (ZOFRAN-ODT) 4 MG ODT tab Take 1 tablet (4 mg) by mouth every 8 hours as needed for nausea Past Week at Unknown time Yes    potassium chloride ER (K-DUR/KLOR-CON M) 20 MEQ CR tablet Take 40 mg in the am and 20 mg in the afternoon. 2/28/2019 at Unknown time Yes My Norton M, NP   spironolactone (ALDACTONE) 25 MG tablet Take 25 mg by mouth daily Past Month at Unknown time Yes Unknown, Entered By History   tamsulosin (FLOMAX) 0.4 MG capsule Take 1 capsule (0.4 mg) by mouth daily (with dinner) Past Month at Unknown time Yes    warfarin (COUMADIN) 5 MG tablet Take 2.5 mg on Sun/Thurs and 5 mg on all other days of the week Past Month at Unknown time Yes    BETA BLOCKER NOT PRESCRIBED, INTENTIONAL, Beta Blocker not prescribed intentionally due to Recent tx with IV positive inotropic agent  Patient not taking: Reported on 1/30/2019   Luis Carlos Templeton MD       Medication history completed by: Jammie Scherer, PD4 pharmacy student

## 2019-03-01 NOTE — PLAN OF CARE
Discharge Planner PT   Patient plan for discharge: agrees with rec.  Current status: Supine to sit with mod Ax1.  STS x3 with IV pole and min A x1 from low surface, CGA from bed.  Stands well with minor use of UE. Vc to avoid UE use. Transfers to commode at end of session with CGAx1.  Amb 125ft, 50 ft and 75ft with single HHA on IV pole and CGAx1 with chair.  BP drops from ~110/70 to 80's/50 but with sit, quickly recovers.  Denies any lightheadedness.      Barriers to return to prior living situation: weakness, pain, A with mobility  Recommendations for discharge: currently would need rehab but if doing well with transfers, gait and stairs, then anticipate home with A from parents and OP PT.   Rationale for recommendations: Pt currently below baseline, limited by pain and lines primarily.  With progress with PT and stairs, pt likely safe to discharge home.

## 2019-03-02 ENCOUNTER — APPOINTMENT (OUTPATIENT)
Dept: PHYSICAL THERAPY | Facility: CLINIC | Age: 54
DRG: 001 | End: 2019-03-02
Attending: INTERNAL MEDICINE
Payer: COMMERCIAL

## 2019-03-02 ENCOUNTER — APPOINTMENT (OUTPATIENT)
Dept: GENERAL RADIOLOGY | Facility: CLINIC | Age: 54
DRG: 001 | End: 2019-03-02
Attending: PHYSICIAN ASSISTANT
Payer: COMMERCIAL

## 2019-03-02 LAB
ABO + RH BLD: NORMAL
ABO + RH BLD: NORMAL
ALBUMIN SERPL-MCNC: 2.8 G/DL (ref 3.4–5)
ALP SERPL-CCNC: 87 U/L (ref 40–150)
ALT SERPL W P-5'-P-CCNC: 36 U/L (ref 0–70)
ANION GAP SERPL CALCULATED.3IONS-SCNC: 11 MMOL/L (ref 3–14)
ANION GAP SERPL CALCULATED.3IONS-SCNC: 12 MMOL/L (ref 3–14)
ANION GAP SERPL CALCULATED.3IONS-SCNC: 13 MMOL/L (ref 3–14)
ANION GAP SERPL CALCULATED.3IONS-SCNC: 16 MMOL/L (ref 3–14)
AST SERPL W P-5'-P-CCNC: 26 U/L (ref 0–45)
BASE DEFICIT BLDV-SCNC: 1.8 MMOL/L
BASE DEFICIT BLDV-SCNC: 2.7 MMOL/L
BASE DEFICIT BLDV-SCNC: 3 MMOL/L
BASE DEFICIT BLDV-SCNC: 3 MMOL/L
BASE DEFICIT BLDV-SCNC: 3.1 MMOL/L
BILIRUB DIRECT SERPL-MCNC: 0.6 MG/DL (ref 0–0.2)
BILIRUB SERPL-MCNC: 0.9 MG/DL (ref 0.2–1.3)
BLD GP AB SCN SERPL QL: NORMAL
BLOOD BANK CMNT PATIENT-IMP: NORMAL
BUN SERPL-MCNC: 65 MG/DL (ref 7–30)
BUN SERPL-MCNC: 68 MG/DL (ref 7–30)
BUN SERPL-MCNC: 69 MG/DL (ref 7–30)
BUN SERPL-MCNC: 73 MG/DL (ref 7–30)
CALCIUM SERPL-MCNC: 7.5 MG/DL (ref 8.5–10.1)
CALCIUM SERPL-MCNC: 7.8 MG/DL (ref 8.5–10.1)
CALCIUM SERPL-MCNC: 7.9 MG/DL (ref 8.5–10.1)
CALCIUM SERPL-MCNC: 8 MG/DL (ref 8.5–10.1)
CHLORIDE SERPL-SCNC: 94 MMOL/L (ref 94–109)
CHLORIDE SERPL-SCNC: 96 MMOL/L (ref 94–109)
CHLORIDE SERPL-SCNC: 97 MMOL/L (ref 94–109)
CHLORIDE SERPL-SCNC: 97 MMOL/L (ref 94–109)
CO2 SERPL-SCNC: 20 MMOL/L (ref 20–32)
CO2 SERPL-SCNC: 20 MMOL/L (ref 20–32)
CO2 SERPL-SCNC: 21 MMOL/L (ref 20–32)
CO2 SERPL-SCNC: 23 MMOL/L (ref 20–32)
CREAT SERPL-MCNC: 2.09 MG/DL (ref 0.66–1.25)
CREAT SERPL-MCNC: 2.1 MG/DL (ref 0.66–1.25)
CREAT SERPL-MCNC: 2.12 MG/DL (ref 0.66–1.25)
CREAT SERPL-MCNC: 2.12 MG/DL (ref 0.66–1.25)
ERYTHROCYTE [DISTWIDTH] IN BLOOD BY AUTOMATED COUNT: 14.3 % (ref 10–15)
ERYTHROCYTE [DISTWIDTH] IN BLOOD BY AUTOMATED COUNT: 14.4 % (ref 10–15)
GFR SERPL CREATININE-BSD FRML MDRD: 34 ML/MIN/{1.73_M2}
GLUCOSE BLDC GLUCOMTR-MCNC: 109 MG/DL (ref 70–99)
GLUCOSE BLDC GLUCOMTR-MCNC: 112 MG/DL (ref 70–99)
GLUCOSE BLDC GLUCOMTR-MCNC: 115 MG/DL (ref 70–99)
GLUCOSE BLDC GLUCOMTR-MCNC: 141 MG/DL (ref 70–99)
GLUCOSE SERPL-MCNC: 105 MG/DL (ref 70–99)
GLUCOSE SERPL-MCNC: 111 MG/DL (ref 70–99)
GLUCOSE SERPL-MCNC: 130 MG/DL (ref 70–99)
GLUCOSE SERPL-MCNC: 160 MG/DL (ref 70–99)
HCO3 BLDV-SCNC: 22 MMOL/L (ref 21–28)
HCO3 BLDV-SCNC: 23 MMOL/L (ref 21–28)
HCT VFR BLD AUTO: 27.1 % (ref 40–53)
HCT VFR BLD AUTO: 27.4 % (ref 40–53)
HCT VFR BLD AUTO: 27.5 % (ref 40–53)
HCT VFR BLD AUTO: 28.1 % (ref 40–53)
HGB BLD-MCNC: 8.7 G/DL (ref 13.3–17.7)
HGB BLD-MCNC: 8.9 G/DL (ref 13.3–17.7)
LMWH PPP CHRO-ACNC: 0.12 IU/ML
LMWH PPP CHRO-ACNC: 0.24 IU/ML
LMWH PPP CHRO-ACNC: 0.25 IU/ML
MAGNESIUM SERPL-MCNC: 2.6 MG/DL (ref 1.6–2.3)
MAGNESIUM SERPL-MCNC: 2.7 MG/DL (ref 1.6–2.3)
MAGNESIUM SERPL-MCNC: 2.8 MG/DL (ref 1.6–2.3)
MCH RBC QN AUTO: 29.6 PG (ref 26.5–33)
MCH RBC QN AUTO: 29.7 PG (ref 26.5–33)
MCH RBC QN AUTO: 29.9 PG (ref 26.5–33)
MCH RBC QN AUTO: 30.1 PG (ref 26.5–33)
MCHC RBC AUTO-ENTMCNC: 31.7 G/DL (ref 31.5–36.5)
MCHC RBC AUTO-ENTMCNC: 32.1 G/DL (ref 31.5–36.5)
MCHC RBC AUTO-ENTMCNC: 32.4 G/DL (ref 31.5–36.5)
MCHC RBC AUTO-ENTMCNC: 32.5 G/DL (ref 31.5–36.5)
MCV RBC AUTO: 92 FL (ref 78–100)
MCV RBC AUTO: 93 FL (ref 78–100)
O2/TOTAL GAS SETTING VFR VENT: 21 %
O2/TOTAL GAS SETTING VFR VENT: ABNORMAL %
O2/TOTAL GAS SETTING VFR VENT: ABNORMAL %
OXYHGB MFR BLDV: 45 %
OXYHGB MFR BLDV: 51 %
OXYHGB MFR BLDV: 52 %
OXYHGB MFR BLDV: 54 %
OXYHGB MFR BLDV: 54 %
PCO2 BLDV: 36 MM HG (ref 40–50)
PCO2 BLDV: 36 MM HG (ref 40–50)
PCO2 BLDV: 37 MM HG (ref 40–50)
PCO2 BLDV: 37 MM HG (ref 40–50)
PCO2 BLDV: 40 MM HG (ref 40–50)
PH BLDV: 7.36 PH (ref 7.32–7.43)
PH BLDV: 7.38 PH (ref 7.32–7.43)
PH BLDV: 7.39 PH (ref 7.32–7.43)
PH BLDV: 7.39 PH (ref 7.32–7.43)
PH BLDV: 7.4 PH (ref 7.32–7.43)
PHOSPHATE SERPL-MCNC: 5 MG/DL (ref 2.5–4.5)
PLATELET # BLD AUTO: 192 10E9/L (ref 150–450)
PLATELET # BLD AUTO: 199 10E9/L (ref 150–450)
PLATELET # BLD AUTO: 202 10E9/L (ref 150–450)
PLATELET # BLD AUTO: 205 10E9/L (ref 150–450)
PO2 BLDV: 29 MM HG (ref 25–47)
PO2 BLDV: 30 MM HG (ref 25–47)
PO2 BLDV: 31 MM HG (ref 25–47)
PO2 BLDV: 32 MM HG (ref 25–47)
PO2 BLDV: 32 MM HG (ref 25–47)
POTASSIUM SERPL-SCNC: 4.1 MMOL/L (ref 3.4–5.3)
POTASSIUM SERPL-SCNC: 4.1 MMOL/L (ref 3.4–5.3)
POTASSIUM SERPL-SCNC: 4.2 MMOL/L (ref 3.4–5.3)
POTASSIUM SERPL-SCNC: 4.2 MMOL/L (ref 3.4–5.3)
PROT SERPL-MCNC: 5.6 G/DL (ref 6.8–8.8)
RBC # BLD AUTO: 2.93 10E12/L (ref 4.4–5.9)
RBC # BLD AUTO: 2.96 10E12/L (ref 4.4–5.9)
RBC # BLD AUTO: 2.98 10E12/L (ref 4.4–5.9)
RBC # BLD AUTO: 3.01 10E12/L (ref 4.4–5.9)
SODIUM SERPL-SCNC: 128 MMOL/L (ref 133–144)
SODIUM SERPL-SCNC: 130 MMOL/L (ref 133–144)
SODIUM SERPL-SCNC: 131 MMOL/L (ref 133–144)
SODIUM SERPL-SCNC: 132 MMOL/L (ref 133–144)
SPECIMEN EXP DATE BLD: NORMAL
TROPONIN I SERPL-MCNC: 2.92 UG/L (ref 0–0.04)
WBC # BLD AUTO: 11.2 10E9/L (ref 4–11)
WBC # BLD AUTO: 11.5 10E9/L (ref 4–11)
WBC # BLD AUTO: 12.4 10E9/L (ref 4–11)
WBC # BLD AUTO: 12.8 10E9/L (ref 4–11)

## 2019-03-02 PROCEDURE — 40000196 ZZH STATISTIC RAPCV CVP MONITORING

## 2019-03-02 PROCEDURE — 20000004 ZZH R&B ICU UMMC

## 2019-03-02 PROCEDURE — 40000275 ZZH STATISTIC RCP TIME EA 10 MIN

## 2019-03-02 PROCEDURE — 25000128 H RX IP 250 OP 636: Performed by: STUDENT IN AN ORGANIZED HEALTH CARE EDUCATION/TRAINING PROGRAM

## 2019-03-02 PROCEDURE — 82805 BLOOD GASES W/O2 SATURATION: CPT | Performed by: PHYSICIAN ASSISTANT

## 2019-03-02 PROCEDURE — 85520 HEPARIN ASSAY: CPT | Performed by: PHYSICIAN ASSISTANT

## 2019-03-02 PROCEDURE — 25000132 ZZH RX MED GY IP 250 OP 250 PS 637: Performed by: INTERNAL MEDICINE

## 2019-03-02 PROCEDURE — 80048 BASIC METABOLIC PNL TOTAL CA: CPT | Performed by: STUDENT IN AN ORGANIZED HEALTH CARE EDUCATION/TRAINING PROGRAM

## 2019-03-02 PROCEDURE — 25000132 ZZH RX MED GY IP 250 OP 250 PS 637: Performed by: NURSE PRACTITIONER

## 2019-03-02 PROCEDURE — 00000146 ZZHCL STATISTIC GLUCOSE BY METER IP

## 2019-03-02 PROCEDURE — 80048 BASIC METABOLIC PNL TOTAL CA: CPT | Performed by: PHYSICIAN ASSISTANT

## 2019-03-02 PROCEDURE — 25000125 ZZHC RX 250: Performed by: SURGERY

## 2019-03-02 PROCEDURE — 25000132 ZZH RX MED GY IP 250 OP 250 PS 637: Performed by: STUDENT IN AN ORGANIZED HEALTH CARE EDUCATION/TRAINING PROGRAM

## 2019-03-02 PROCEDURE — 25000128 H RX IP 250 OP 636: Performed by: INTERNAL MEDICINE

## 2019-03-02 PROCEDURE — 85520 HEPARIN ASSAY: CPT | Performed by: STUDENT IN AN ORGANIZED HEALTH CARE EDUCATION/TRAINING PROGRAM

## 2019-03-02 PROCEDURE — 25000125 ZZHC RX 250: Performed by: INTERNAL MEDICINE

## 2019-03-02 PROCEDURE — 97530 THERAPEUTIC ACTIVITIES: CPT | Mod: GP | Performed by: PHYSICAL THERAPIST

## 2019-03-02 PROCEDURE — 86850 RBC ANTIBODY SCREEN: CPT | Performed by: SURGERY

## 2019-03-02 PROCEDURE — 85027 COMPLETE CBC AUTOMATED: CPT | Performed by: STUDENT IN AN ORGANIZED HEALTH CARE EDUCATION/TRAINING PROGRAM

## 2019-03-02 PROCEDURE — 80076 HEPATIC FUNCTION PANEL: CPT | Performed by: STUDENT IN AN ORGANIZED HEALTH CARE EDUCATION/TRAINING PROGRAM

## 2019-03-02 PROCEDURE — 25000131 ZZH RX MED GY IP 250 OP 636 PS 637: Performed by: INTERNAL MEDICINE

## 2019-03-02 PROCEDURE — 83735 ASSAY OF MAGNESIUM: CPT | Performed by: PHYSICIAN ASSISTANT

## 2019-03-02 PROCEDURE — 86900 BLOOD TYPING SEROLOGIC ABO: CPT | Performed by: SURGERY

## 2019-03-02 PROCEDURE — 25000132 ZZH RX MED GY IP 250 OP 250 PS 637: Performed by: SURGERY

## 2019-03-02 PROCEDURE — 71045 X-RAY EXAM CHEST 1 VIEW: CPT

## 2019-03-02 PROCEDURE — 97110 THERAPEUTIC EXERCISES: CPT | Mod: GP | Performed by: PHYSICAL THERAPIST

## 2019-03-02 PROCEDURE — 86901 BLOOD TYPING SEROLOGIC RH(D): CPT | Performed by: SURGERY

## 2019-03-02 PROCEDURE — 40000048 ZZH STATISTIC DAILY SWAN MONITORING

## 2019-03-02 PROCEDURE — 85027 COMPLETE CBC AUTOMATED: CPT | Performed by: PHYSICIAN ASSISTANT

## 2019-03-02 PROCEDURE — 84100 ASSAY OF PHOSPHORUS: CPT | Performed by: STUDENT IN AN ORGANIZED HEALTH CARE EDUCATION/TRAINING PROGRAM

## 2019-03-02 PROCEDURE — 82805 BLOOD GASES W/O2 SATURATION: CPT | Performed by: STUDENT IN AN ORGANIZED HEALTH CARE EDUCATION/TRAINING PROGRAM

## 2019-03-02 PROCEDURE — 83735 ASSAY OF MAGNESIUM: CPT | Performed by: STUDENT IN AN ORGANIZED HEALTH CARE EDUCATION/TRAINING PROGRAM

## 2019-03-02 PROCEDURE — 99291 CRITICAL CARE FIRST HOUR: CPT | Mod: GC | Performed by: INTERNAL MEDICINE

## 2019-03-02 PROCEDURE — 40000014 ZZH STATISTIC ARTERIAL MONITORING DAILY

## 2019-03-02 PROCEDURE — 25000132 ZZH RX MED GY IP 250 OP 250 PS 637: Performed by: ANESTHESIOLOGY

## 2019-03-02 RX ORDER — BUMETANIDE 0.25 MG/ML
0.5 INJECTION INTRAMUSCULAR; INTRAVENOUS ONCE
Status: DISCONTINUED | OUTPATIENT
Start: 2019-03-02 | End: 2019-03-02

## 2019-03-02 RX ORDER — HYDRALAZINE HYDROCHLORIDE 25 MG/1
25 TABLET, FILM COATED ORAL EVERY 8 HOURS SCHEDULED
Status: DISCONTINUED | OUTPATIENT
Start: 2019-03-02 | End: 2019-03-02

## 2019-03-02 RX ORDER — HYDRALAZINE HYDROCHLORIDE 25 MG/1
25 TABLET, FILM COATED ORAL EVERY 8 HOURS SCHEDULED
Status: DISCONTINUED | OUTPATIENT
Start: 2019-03-02 | End: 2019-03-03

## 2019-03-02 RX ORDER — BUMETANIDE 0.25 MG/ML
4 INJECTION INTRAMUSCULAR; INTRAVENOUS ONCE
Status: COMPLETED | OUTPATIENT
Start: 2019-03-02 | End: 2019-03-02

## 2019-03-02 RX ADMIN — Medication 500000 UNITS: at 20:00

## 2019-03-02 RX ADMIN — MESALAMINE 2400 MG: 800 TABLET, DELAYED RELEASE ORAL at 08:54

## 2019-03-02 RX ADMIN — ACETAMINOPHEN 650 MG: 325 TABLET, FILM COATED ORAL at 14:07

## 2019-03-02 RX ADMIN — TACROLIMUS 1 MG: 1 CAPSULE ORAL at 17:19

## 2019-03-02 RX ADMIN — Medication 500000 UNITS: at 17:18

## 2019-03-02 RX ADMIN — MELATONIN TAB 3 MG 3 MG: 3 TAB at 22:22

## 2019-03-02 RX ADMIN — FLUTICASONE PROPIONATE 2 PUFF: 220 AEROSOL, METERED RESPIRATORY (INHALATION) at 20:18

## 2019-03-02 RX ADMIN — TERBUTALINE SULFATE 10 MG: 5 TABLET ORAL at 23:48

## 2019-03-02 RX ADMIN — FLUTICASONE PROPIONATE 2 PUFF: 220 AEROSOL, METERED RESPIRATORY (INHALATION) at 08:51

## 2019-03-02 RX ADMIN — LEVOTHYROXINE SODIUM 100 MCG: 100 TABLET ORAL at 08:50

## 2019-03-02 RX ADMIN — Medication 500000 UNITS: at 08:51

## 2019-03-02 RX ADMIN — DULOXETINE 20 MG: 20 CAPSULE, DELAYED RELEASE ORAL at 08:50

## 2019-03-02 RX ADMIN — BUMETANIDE 4 MG: 0.25 INJECTION INTRAMUSCULAR; INTRAVENOUS at 08:48

## 2019-03-02 RX ADMIN — ACETAMINOPHEN 650 MG: 325 TABLET, FILM COATED ORAL at 08:50

## 2019-03-02 RX ADMIN — Medication 10 MG: at 20:02

## 2019-03-02 RX ADMIN — Medication 10 MG: at 14:07

## 2019-03-02 RX ADMIN — OXYCODONE HYDROCHLORIDE 10 MG: 5 TABLET ORAL at 03:11

## 2019-03-02 RX ADMIN — OXYCODONE HYDROCHLORIDE 5 MG: 5 TABLET ORAL at 08:50

## 2019-03-02 RX ADMIN — SENNOSIDES AND DOCUSATE SODIUM 2 TABLET: 8.6; 5 TABLET ORAL at 08:50

## 2019-03-02 RX ADMIN — MYCOPHENOLATE MOFETIL 1500 MG: 500 TABLET ORAL at 08:49

## 2019-03-02 RX ADMIN — LIDOCAINE 1 PATCH: 560 PATCH PERCUTANEOUS; TOPICAL; TRANSDERMAL at 08:49

## 2019-03-02 RX ADMIN — Medication 500000 UNITS: at 11:38

## 2019-03-02 RX ADMIN — BUMETANIDE 1 MG/HR: 0.25 INJECTION INTRAMUSCULAR; INTRAVENOUS at 08:47

## 2019-03-02 RX ADMIN — MONTELUKAST SODIUM 10 MG: 10 TABLET, COATED ORAL at 22:22

## 2019-03-02 RX ADMIN — HEPARIN SODIUM 1100 UNITS/HR: 10000 INJECTION, SOLUTION INTRAVENOUS at 02:53

## 2019-03-02 RX ADMIN — MESALAMINE 2400 MG: 800 TABLET, DELAYED RELEASE ORAL at 19:58

## 2019-03-02 RX ADMIN — PREDNISONE 20 MG: 10 TABLET ORAL at 17:19

## 2019-03-02 RX ADMIN — TERBUTALINE SULFATE 10 MG: 5 TABLET ORAL at 17:18

## 2019-03-02 RX ADMIN — PANTOPRAZOLE SODIUM 40 MG: 40 TABLET, DELAYED RELEASE ORAL at 08:50

## 2019-03-02 RX ADMIN — HEPARIN SODIUM 1100 UNITS/HR: 10000 INJECTION, SOLUTION INTRAVENOUS at 23:47

## 2019-03-02 RX ADMIN — TACROLIMUS 1 MG: 1 CAPSULE ORAL at 08:50

## 2019-03-02 RX ADMIN — ACETAMINOPHEN 650 MG: 325 TABLET, FILM COATED ORAL at 03:11

## 2019-03-02 RX ADMIN — TERBUTALINE SULFATE 10 MG: 5 TABLET ORAL at 08:52

## 2019-03-02 RX ADMIN — PREDNISONE 20 MG: 10 TABLET ORAL at 08:50

## 2019-03-02 RX ADMIN — ACETAMINOPHEN 650 MG: 325 TABLET, FILM COATED ORAL at 19:58

## 2019-03-02 RX ADMIN — ROSUVASTATIN CALCIUM 10 MG: 10 TABLET, FILM COATED ORAL at 08:50

## 2019-03-02 RX ADMIN — OXYCODONE HYDROCHLORIDE 5 MG: 5 TABLET ORAL at 19:58

## 2019-03-02 RX ADMIN — MYCOPHENOLATE MOFETIL 1500 MG: 500 TABLET ORAL at 17:18

## 2019-03-02 ASSESSMENT — ACTIVITIES OF DAILY LIVING (ADL)
ADLS_ACUITY_SCORE: 11

## 2019-03-02 ASSESSMENT — PAIN DESCRIPTION - DESCRIPTORS
DESCRIPTORS: ACHING
DESCRIPTORS: ACHING;DISCOMFORT
DESCRIPTORS: DISCOMFORT;ACHING
DESCRIPTORS: ACHING;CONSTANT;DISCOMFORT

## 2019-03-02 ASSESSMENT — MIFFLIN-ST. JEOR: SCORE: 1425.5

## 2019-03-02 NOTE — PLAN OF CARE
D/I/A: A&O x4, no neuro deficits, gave tyl & 5mg oxy x3 for rib/chest tube site pain. BP stable, HR sinus 90-100s. [place] at 56, fausto's q4h (see flow sheets for fausto numbers). Started dobutamine gtt @ 2.5 mcg/kg/min at 1700, heparin gtt @ 1100 unit(s)/hr, isuprel gtt @ 0.03 mcg/kg/min. Voided small amount in urinal, loose bm x1 on commode. Up in chair x2 w/ assist x1, walked in goss with with PT.   P: Fausto q4h, update team with changes, continue with plan of care.

## 2019-03-02 NOTE — PROGRESS NOTES
CV ICU PROGRESS NOTE  March 2, 2019      CO-MORBIDITIES:   Chronic systolic heart failure (H)  (primary encounter diagnosis)  Acute on chronic systolic congestive heart failure (H)    ASSESSMENT: Everton Larios is a 53 year old male with PMH etoh abuse, hypothyroidism, intermittent asthma, ulcerative colitis, Depression, Chronic HFrEF, NICM admitted with cardiogenic shock requiring subclavian balloon pump now s/p OHT 2/24/19 with Piyush House and Christopher.     TODAY'S PROGRESS:   - increase dobutamine to 5 mcg/kg/min for RV support  - start sildenafil 10 q8h  - decrease hydralazine to 25 q8h  - diuresis with bumex gtt at 1 mg/hr  - Remove arterial line    PLAN:  Neuro/ pain/ sedation:  - Monitor neurological status. Notify the MD for any acute changes in exam.  - Tylenol, oxycodone and PRN fentanyl     #Depression  PTA cymbalta 20mg  Melatonin for sleep     Pulmonary care:   - 2l NC  - Supp oxygen to maintain SpO2 >92%  - Encourage IS and deep breathing    #Intermittent asthma  - PTA Montelukast ordered    Cardiovascular:  HR goal >95  - Monitor hemodynamic status.   - MAP >65. HR goal > 95.  - increase dobutamine to 5 mcg/kg/min for RV support  - start sildenafil 10 q8h  - decrease hydralazine to 25 q8h  - continue terbutaline 10 mg q8h  - Holding ASA for bleeding around lines.   - Continue statin.     GI care:   #constipation  - Regular diet  - Senna, miralax and suppository for bowel regimen; MoM and miralax prn     #Intraperitoneal free air (CT obtained 2/26)  - Small peritoneal defect made during redo operation, closed with stitches intra-op- likely source of free air. No peritoneal signs, pain improved.     #Pierce's esophagus  #Ulcerative colitis  -PTA Mesalamine     Fluids/ Electrolytes/ Nutrition:   - TKO for IV fluid hydration  - No indication for parenteral nutrition.    Renal/ Fluid Balance:    - Urine output is adequate so far.  - Will continue to monitor intake and output.  - Bumex gtt for  diuresis.   - PTA Tamsulosin      Endocrine:    # Stress hyperglycemia  - sliding scale insulin as needed  # hypothyroidism  - Home levothyroxine      ID/ Antibiotics:  - Perioperative antibiotics.   - Vancomycin and zosyn x 3 days- completed.  - Valcyte for CMV prophylaxis; holding bactrim for PCP prophylaxis.  - Nystatin for thrush     Heme:     #Bypass of persistent left SVC to R atrial appendage with Varney-Davis graft. Will require lifelong anti-coagulation for graft  - Hgb goal >7. Daily CBC.  - Low intensity heparin today     Prophylaxis:    - Mechanical prophylaxis for DVT  - Heparin as above  - Protonix qd     Lines/ tubes/ drains:  - RIJ, [place], Arterial line, med/pleural chest tubes     Disposition:  - CV ICU.     Patient seen, findings and plan discussed with CV ICU staff, Dr. Mederos.    Leyla Bowling MD  General Surgery PGY-3  ====================================    SUBJECTIVE:   Chest pain overnight- ekg and trop obtained  Bumex gtt started for high CVP  Echo shows poor right heart function, tricuspid regurg    OBJECTIVE:   1. VITAL SIGNS:   Temp:  [97.5  F (36.4  C)-97.7  F (36.5  C)] 97.7  F (36.5  C)  Pulse:  [] 98  Heart Rate:  [] 98  Resp:  [10-32] 11  BP: ()/(55-83) 95/55  MAP:  [58 mmHg-96 mmHg] 84 mmHg  Arterial Line BP: ()/(53-87) 123/58  SpO2:  [96 %-100 %] 96 %  Resp: 11      2. INTAKE/ OUTPUT:   I/O last 3 completed shifts:  In: 1296.9 [P.O.:660; I.V.:636.9]  Out: 1448 [Urine:480; Other:470; Chest Tube:498]    3. PHYSICAL EXAMINATION:     General: very pleasant, appropriately interactive   Neuro: Alert and   Resp: CTAB on 2L oxygen  CV: Regular rate, regular rhythm, serosanguinous output from chest tubes  Abdomen: Soft, Non-distended, non-tender  Incisions: c/d/i  Extremities: warm and well perfused    4. INVESTIGATIONS:   Arterial Blood Gases   Recent Labs   Lab 02/25/19  1717 02/25/19  1502 02/25/19  0918 02/25/19  0350   PH 7.32* 7.31* 7.33* 7.39   PCO2 36 51* 46* 42    PO2 110* 75* 125* 87   HCO3 19* 26 24 25     Complete Blood Count   Recent Labs   Lab 03/02/19  0930 03/02/19  0305 03/01/19 1959 03/01/19  1648   WBC 11.2* 11.5* 11.9* 13.5*   HGB 8.9* 8.9* 9.4* 9.5*    192 192 225     Basic Metabolic Panel  Recent Labs   Lab 03/02/19  0930 03/02/19  0305 03/01/19 1959 03/01/19  1648   * 132* 132* 131*   POTASSIUM 4.2 4.1 4.1 4.0   CHLORIDE 94 97 98 98   CO2 21 20 18* 20   BUN 69* 65* 65* 63*   CR 2.09* 2.10* 1.97* 1.80*   * 111* 140* 112*     Liver Function Tests  Recent Labs   Lab 03/02/19  0930 02/25/19  1502 02/24/19 1817 02/24/19  1556 02/24/19  0340 02/23/19  1835   AST 26 110* 92*  --   --  21   ALT 36 28 24  --   --  15   ALKPHOS 87 89 96  --   --  169*   BILITOTAL 0.9 1.4* 2.7*  --   --  1.6*   ALBUMIN 2.8* 3.4 2.4*  --   --  3.4   INR  --  1.29* 1.50* 1.57* 1.19* 1.20*     Pancreatic Enzymes  Recent Labs   Lab 02/23/19  1835   AMYLASE 22*     Coagulation Profile  Recent Labs   Lab 02/27/19  0421 02/25/19  1502 02/24/19 1817 02/24/19  1556 02/24/19  0340   INR  --  1.29* 1.50* 1.57* 1.19*   PTT 38* 32 55* 38*  --          5. RADIOLOGY:   Recent Results (from the past 24 hour(s))   XR Chest Port 1 View    Narrative    Exam: XR CHEST PORT 1 VW, 3/2/2019 2:00 AM    Indication: confirm PA catheter position    Comparison: 3/1/2019    Findings:   AP view of the chest. Sherrill-Liz catheter with the tip in the right  main pulmonary artery. Postsurgical changes of heart transplant.  Intact median sternotomy wires. Bilateral chest and mediastinal drains  are unchanged.    Cardiac silhouette is unchanged. There is a stable area of atelectasis  in the left lower lung. No new focal consolidations. No large pleural  effusion or pneumothorax. Upper abdomen is unremarkable.      Impression    Impression:   1. Sherrill-Liz catheter with the tip in the right main pulmonary artery.  2. Additional support devices and postsurgical changes as detailed  above.  3. Unchanged  area of a left basilar atelectasis/consolidation. No  airspace opacity.    I have personally reviewed the examination and initial interpretation  and I agree with the findings.    KIKI MARSHALL       =========================================

## 2019-03-02 NOTE — PROGRESS NOTES
CVTS PROGRESS NOTE  March 2, 2019      CO-MORBIDITIES:   Chronic systolic heart failure (H)  (primary encounter diagnosis)  Acute on chronic systolic congestive heart failure (H)    ASSESSMENT: Everton Larios is a 53 year old male with PMH etoh abuse, hypothyroidism, intermittent asthma, ulcerative colitis, Depression, Chronic HFrEF, NICM admitted with cardiogenic shock requiring subclavian balloon pump now s/p OHT 2/24/19 with Piyush House and Christopher.     TODAY'S PROGRESS:   - increase dobutamine to 5 mcg/kg/min for RV support  - start sildenafil 10 q8h  - decrease hydralazine to 25 q8h  - diuresis with bumex gtt at 1 mg/hr  - Remove arterial line    PLAN:  Neuro/ pain/ sedation:  - Monitor neurological status. Notify the MD for any acute changes in exam.  - Tylenol, oxycodone and PRN fentanyl     #Depression  PTA cymbalta 20mg  Melatonin for sleep     Pulmonary care:   - 2l NC  - Supp oxygen to maintain SpO2 >92%  - Encourage IS and deep breathing    #Intermittent asthma  - PTA Montelukast ordered    Cardiovascular:  HR goal >95  - Monitor hemodynamic status.   - MAP >65. HR goal > 95.  - increase dobutamine to 5 mcg/kg/min for RV support  - start sildenafil 10 q8h  - decrease hydralazine to 25 q8h  - continue terbutaline 10 mg q8h  - Holding ASA for bleeding around lines.   - Continue statin.     GI care:   #constipation  - Regular diet  - Senna, miralax and suppository for bowel regimen; MoM and miralax prn     #Intraperitoneal free air (CT obtained 2/26)  - Small peritoneal defect made during redo operation, closed with stitches intra-op- likely source of free air. No peritoneal signs, pain improved.     #Pierce's esophagus  #Ulcerative colitis  -PTA Mesalamine     Fluids/ Electrolytes/ Nutrition:   - TKO for IV fluid hydration  - No indication for parenteral nutrition.    Renal/ Fluid Balance:    - Urine output is adequate so far.  - Will continue to monitor intake and output.  - Bumex gtt for diuresis.    - PTA Tamsulosin      Endocrine:    # Stress hyperglycemia  - sliding scale insulin as needed  # hypothyroidism  - Home levothyroxine      ID/ Antibiotics:  - Perioperative antibiotics.   - Vancomycin and zosyn x 3 days- completed.  - Valcyte for CMV prophylaxis; holding bactrim for PCP prophylaxis.  - Nystatin for thrush     Heme:     #Bypass of persistent left SVC to R atrial appendage with Deeth-Davis graft. Will require lifelong anti-coagulation for graft  - Hgb goal >7. Daily CBC.  - Low intensity heparin today     Prophylaxis:    - Mechanical prophylaxis for DVT  - Heparin as above  - Protonix qd     Lines/ tubes/ drains:  - RIJ, [place], Arterial line, med/pleural chest tubes     Disposition:  - CV ICU.     Patient seen, findings and plan discussed with CVTS fellow.     Leyla Bowling MD  General Surgery PGY-3  ====================================    SUBJECTIVE:   Chest pain overnight- ekg and trop obtained  Bumex gtt started for high CVP  Echo shows poor right heart function, tricuspid regurg    OBJECTIVE:   1. VITAL SIGNS:   Temp:  [97.5  F (36.4  C)-97.7  F (36.5  C)] 97.7  F (36.5  C)  Pulse:  [] 98  Heart Rate:  [] 98  Resp:  [10-32] 11  BP: ()/(55-83) 95/55  MAP:  [58 mmHg-96 mmHg] 84 mmHg  Arterial Line BP: ()/(53-87) 123/58  SpO2:  [96 %-100 %] 96 %  Resp: 11      2. INTAKE/ OUTPUT:   I/O last 3 completed shifts:  In: 1296.9 [P.O.:660; I.V.:636.9]  Out: 1448 [Urine:480; Other:470; Chest Tube:498]    3. PHYSICAL EXAMINATION:     General: very pleasant, appropriately interactive   Neuro: Alert and   Resp: CTAB on 2L oxygen  CV: Regular rate, regular rhythm, serosanguinous output from chest tubes  Abdomen: Soft, Non-distended, non-tender  Incisions: c/d/i  Extremities: warm and well perfused    4. INVESTIGATIONS:   Arterial Blood Gases   Recent Labs   Lab 02/25/19  1717 02/25/19  1502 02/25/19  0918 02/25/19  0350   PH 7.32* 7.31* 7.33* 7.39   PCO2 36 51* 46* 42   PO2 110* 75* 125*  87   HCO3 19* 26 24 25     Complete Blood Count   Recent Labs   Lab 03/02/19  0930 03/02/19  0305 03/01/19 1959 03/01/19  1648   WBC 11.2* 11.5* 11.9* 13.5*   HGB 8.9* 8.9* 9.4* 9.5*    192 192 225     Basic Metabolic Panel  Recent Labs   Lab 03/02/19  0930 03/02/19  0305 03/01/19 1959 03/01/19  1648   * 132* 132* 131*   POTASSIUM 4.2 4.1 4.1 4.0   CHLORIDE 94 97 98 98   CO2 21 20 18* 20   BUN 69* 65* 65* 63*   CR 2.09* 2.10* 1.97* 1.80*   * 111* 140* 112*     Liver Function Tests  Recent Labs   Lab 03/02/19  0930 02/25/19  1502 02/24/19  1817 02/24/19  1556 02/24/19  0340 02/23/19  1835   AST 26 110* 92*  --   --  21   ALT 36 28 24  --   --  15   ALKPHOS 87 89 96  --   --  169*   BILITOTAL 0.9 1.4* 2.7*  --   --  1.6*   ALBUMIN 2.8* 3.4 2.4*  --   --  3.4   INR  --  1.29* 1.50* 1.57* 1.19* 1.20*     Pancreatic Enzymes  Recent Labs   Lab 02/23/19  1835   AMYLASE 22*     Coagulation Profile  Recent Labs   Lab 02/27/19  0421 02/25/19  1502 02/24/19  1817 02/24/19  1556 02/24/19  0340   INR  --  1.29* 1.50* 1.57* 1.19*   PTT 38* 32 55* 38*  --          5. RADIOLOGY:   Recent Results (from the past 24 hour(s))   XR Chest Port 1 View    Narrative    Exam: XR CHEST PORT 1 VW, 3/2/2019 2:00 AM    Indication: confirm PA catheter position    Comparison: 3/1/2019    Findings:   AP view of the chest. Galvin-Liz catheter with the tip in the right  main pulmonary artery. Postsurgical changes of heart transplant.  Intact median sternotomy wires. Bilateral chest and mediastinal drains  are unchanged.    Cardiac silhouette is unchanged. There is a stable area of atelectasis  in the left lower lung. No new focal consolidations. No large pleural  effusion or pneumothorax. Upper abdomen is unremarkable.      Impression    Impression:   1. Galvin-Liz catheter with the tip in the right main pulmonary artery.  2. Additional support devices and postsurgical changes as detailed  above.  3. Unchanged area of a left  basilar atelectasis/consolidation. No  airspace opacity.    I have personally reviewed the examination and initial interpretation  and I agree with the findings.    KIKI MARSHALL       =========================================

## 2019-03-02 NOTE — PLAN OF CARE
53 y.o. M s/p OHT 02/24  Neuro: A&Ox 4, Intact.  CV: Pt c/o CP at beginning of shift, 12 lead EKG completed, see results. MD notified, troponin checked x2, stable. Per MD continue to monitor. HR . Isoprul DC'd at 0000. SR, no ectopy noted. JOSEPHINE q4hr, see flowsheet for numbers. CT continuing to drain serosang. AM hydralazine held r/t hypotension.   Resp: LS clear/diminished, frequent productive cough. 2 Lpm overnight for comfort. Pt MATA.   GI: x2 loose stools overnight.   : 4mg Bumex given overnight. BUN and Cr rising.   Plan: Continue to monitor hemodynamics. Address concerns with CVTS/CARDs 2.

## 2019-03-02 NOTE — PLAN OF CARE
4E: Discharge Planner PT   Patient plan for discharge: agreeable to rehab if needed.  Current status: pt needs min A for bed mobility, declines gait today due to SOB/ fatigue and LE pain from chronic plantar fasciitis. LE stretching completed to reduce plantar tightness.   Barriers to return to prior living situation: medical status, post surgical precautions, assist for mobility   Recommendations for discharge: at this time pt needing rehab before returning home  Rationale for recommendations: pt currently below baseline level of function, pending progress may be appropriate for return home.        Entered by: Meghann Smith 03/02/2019 4:11 PM

## 2019-03-02 NOTE — PROGRESS NOTES
ADVANCED HEART FAILURE PROGRESS NOTE    SUBJECTIVE:  No acute events. Feeling reasonably well this morning. Walking with PT. Urine output decreased despite increased diuretic.    ROS otherwise negative.    OBJECTIVE:  Vital signs:  Temp: 97.5  F (36.4  C) Temp  Min: 96.7  F (35.9  C)  Max: 97.7  F (36.5  C)  Heart Rate: 94 Heart Rate  Min: 93  Max: 104  BP: 91/56 Systolic (24hrs), Av , Min:91 , Max:95   Diastolic (24hrs), Av, Min:55, Max:58    Resp: 12 Resp  Min: 12  Max: 27  SpO2: 97 % SpO2  Min: 96 %  Max: 100 %    [place]: RA 20, PA 40/20, PCW 16, PA sat 51%, CI 2.1, SVR 1319      Intake/Output Summary (Last 24 hours) at 3/2/2019 0656  Last data filed at 3/2/2019 0600  Gross per 24 hour   Intake 1456.94 ml   Output 1710 ml   Net -253.06 ml       Vitals:    19 0500 19 0400 19 0400   Weight: 61.1 kg (134 lb 11.2 oz) 61.7 kg (136 lb 0.4 oz) 61.6 kg (135 lb 12.9 oz)       Physical Exam:  GEN:  Alert, interactive, appears comfortable, NAD.  HEENT:  [place] R internal jugular   CV:  Regular rate and rhythm, no murmur or JVD.   CHEST: Subclavian incision sutured.  Chest tubes.  LUNGS:  On room air, normal work of breathing, Clear to auscultation bilaterally, no wheezes or crackles.   ABD:  Active bowel sounds, soft, non-tender/non-distended.  No rebound/guarding/rigidity.  EXT:  No edema or cyanosis.  Hands/feet warm to touch with good signs of peripheral perfusion.   NEURO:  Alert and oriented, no new focal deficits appreciated.  PSCYH: Normal mood and affect      Medications:    bumetanide       DOBUTamine 2.5 mcg/kg/min (19 07)     EPINEPHrine IV infusion ADULT Stopped (19)     HEParin 1,100 Units/hr (19)     - MEDICATION INSTRUCTIONS -       - MEDICATION INSTRUCTIONS -       BETA BLOCKER NOT PRESCRIBED         Current Facility-Administered Medications   Medication Dose Route Frequency     bisacodyl  10 mg Rectal Daily     bumetanide  4 mg Intravenous Once      DULoxetine  20 mg Oral Daily     fluticasone  2 puff Inhalation Q12H     heparin lock flush  5 mL Intravenous Q24H     hydrALAZINE  75 mg Oral Q8H SHERRIE     insulin aspart  1-7 Units Subcutaneous TID AC     insulin aspart  1-5 Units Subcutaneous At Bedtime     levothyroxine  100 mcg Oral Daily     lidocaine  1 patch Transdermal Q24H     lidocaine   Transdermal Q8H     lidocaine   Transdermal Q24h     melatonin  3 mg Oral At Bedtime     mesalamine  2,400 mg Oral BID     montelukast  10 mg Oral At Bedtime     mycophenolate  1,500 mg Oral BID IS     nystatin  500,000 Units Oral 4x Daily     pantoprazole  40 mg Oral QAM AC     polyethylene glycol  17 g Oral Daily     predniSONE  20 mg Oral BID w/meals     rosuvastatin  10 mg Oral Daily     senna-docusate  1 tablet Oral BID    Or     senna-docusate  2 tablet Oral BID     sodium chloride (PF)  3 mL Intracatheter Q8H     tacrolimus  1 mg Oral BID IS     terbutaline  10 mg Oral Q8H     valGANciclovir  450 mg Oral Every Other Day         Labs:  CMP  Recent Labs   Lab 03/02/19  0305 03/01/19  1959 03/01/19  1648  03/01/19  0358  02/28/19  1552  02/25/19  1502  02/24/19  1817   * 132* 131*   < > 133   < > 137   < > 142   < > 144   POTASSIUM 4.1 4.1 4.0   < > 4.1   < > 3.6   < > 4.5   < > 3.7   CHLORIDE 97 98 98   < > 97   < > 108   < > 107   < > 106   CO2 20 18* 20   < > 22   < > 19*   < > 24   < > 23   BUN 65* 65* 63*   < > 63*   < > 50*   < > 38*   < > 27   CR 2.10* 1.97* 1.80*   < > 1.83*   < > 1.20   < > 1.48*   < > 1.06   RADAMES 8.0* 7.8* 8.0*   < > 8.4*   < > 6.7*   < > 8.9   < > 8.4*   MAG 2.8*  --  2.8*  --  3.1*  --  2.6*   < > 3.0*   < > 3.0*   PHOS  --   --   --   --  4.0  --  2.7   < > 6.8*  --  2.7   * 140* 112*   < > 121*   < > 117*   < > 120*   < > 130*   LDH  --   --   --   --   --   --   --   --   --   --  536*   ALKPHOS  --   --   --   --   --   --   --   --  89  --  96   BILITOTAL  --   --   --   --   --   --   --   --  1.4*  --  2.7*   AST  --   --    --   --   --   --   --   --  110*  --  92*   ALT  --   --   --   --   --   --   --   --  28  --  24    < > = values in this interval not displayed.     BLOOD GAS  Recent Labs   Lab 02/25/19  1717 02/25/19  1502   PH 7.32* 7.31*   PCO2 36 51*   PO2 110* 75*     CBC  Recent Labs   Lab 03/02/19  0305 03/01/19  1959   WBC 11.5* 11.9*   HGB 8.9* 9.4*   HCT 27.5* 28.4*    192     COAG  Recent Labs   Lab 02/27/19  0421 02/25/19  1502 02/24/19  1817   INR  --  1.29* 1.50*   PTT 38* 32 55*         ASSESSMENT/PLAN:  Everton Larios is a 52 yo gentleman who underwent orthotopic heart transplant 2/24/19 for non ischemic cardiomyopathy and cardiogenic shock.    Updates today:  Filling pressures remain elevated and cardiac index remains marginal despite starting dobutamine @ 2.5 yesterday. Urine output also low despite starting bumex drip. Echo yesterday showed normal LV function but RV appears mild to moderately dilated and hypokinetic. Creatinine trending up slightly, LFTs remain normal. Today we will increase dobutamine to 5 and start sildenafil 10 q8h. Will also decrease hydralazine to 25 q8h given lower BP and starting sildenafil. Will continue to monitor hemodynamics with [place] catheter and closely monitor urine output and kidney function.      #S/p OHT on 2/24:  Graft function:  - echo 3/1 shows normal LV function, RV is mild to moderately dilated and hypokinetic  - increase dobutamine to 5 for continue RV and hemodynamic support  - start sildenafil 10 q8h  - decrease hydralazine to 25 q8h  - filling pressures remain elevated, continue diuresis with bumex gtt @ 1  - off isuprel, continue terbutaline 10 q8h     Immunosuppression:  - on MMF 1500 BID  - s/p methylpred 125 x 3 doses, continue prednisone 20mg BID, taper by 5 daily with negative biopsies  - NOT Simulect protocol, on tacro 1 BID, check level      Prophylaxis:  - CMV +/+: medium risk, start renal dose Valcyte  - PCP: holding Bactrim for now  - Thrush: start  nystatin  - GI: on PPI  - CAV: start Crestor, holding ASA given bleeding at line sites     Biopsies:  - First biopsy on approximately 3/4     Goretex conduit for persistent left SVC:  - will need anticoagulation, advance heparin to low intensity           Seen and staffed with Dr. Young.        Trev Harris MD  Advanced Heart Failure Fellow  [phone]

## 2019-03-03 ENCOUNTER — APPOINTMENT (OUTPATIENT)
Dept: GENERAL RADIOLOGY | Facility: CLINIC | Age: 54
DRG: 001 | End: 2019-03-03
Attending: PHYSICIAN ASSISTANT
Payer: COMMERCIAL

## 2019-03-03 LAB
ANION GAP SERPL CALCULATED.3IONS-SCNC: 13 MMOL/L (ref 3–14)
ANION GAP SERPL CALCULATED.3IONS-SCNC: 15 MMOL/L (ref 3–14)
BASE DEFICIT BLDV-SCNC: 3 MMOL/L
BASE DEFICIT BLDV-SCNC: 4.5 MMOL/L
BASE DEFICIT BLDV-SCNC: 4.6 MMOL/L
BASE DEFICIT BLDV-SCNC: 6.1 MMOL/L
BUN SERPL-MCNC: 70 MG/DL (ref 7–30)
BUN SERPL-MCNC: 73 MG/DL (ref 7–30)
CALCIUM SERPL-MCNC: 7.6 MG/DL (ref 8.5–10.1)
CALCIUM SERPL-MCNC: 7.8 MG/DL (ref 8.5–10.1)
CHLORIDE SERPL-SCNC: 97 MMOL/L (ref 94–109)
CHLORIDE SERPL-SCNC: 98 MMOL/L (ref 94–109)
CO2 SERPL-SCNC: 19 MMOL/L (ref 20–32)
CO2 SERPL-SCNC: 21 MMOL/L (ref 20–32)
CREAT SERPL-MCNC: 2.12 MG/DL (ref 0.66–1.25)
CREAT SERPL-MCNC: 2.34 MG/DL (ref 0.66–1.25)
ERYTHROCYTE [DISTWIDTH] IN BLOOD BY AUTOMATED COUNT: 14.6 % (ref 10–15)
GFR SERPL CREATININE-BSD FRML MDRD: 30 ML/MIN/{1.73_M2}
GFR SERPL CREATININE-BSD FRML MDRD: 34 ML/MIN/{1.73_M2}
GLUCOSE BLDC GLUCOMTR-MCNC: 103 MG/DL (ref 70–99)
GLUCOSE BLDC GLUCOMTR-MCNC: 117 MG/DL (ref 70–99)
GLUCOSE BLDC GLUCOMTR-MCNC: 142 MG/DL (ref 70–99)
GLUCOSE BLDC GLUCOMTR-MCNC: 146 MG/DL (ref 70–99)
GLUCOSE SERPL-MCNC: 111 MG/DL (ref 70–99)
GLUCOSE SERPL-MCNC: 114 MG/DL (ref 70–99)
HCO3 BLDV-SCNC: 19 MMOL/L (ref 21–28)
HCO3 BLDV-SCNC: 20 MMOL/L (ref 21–28)
HCO3 BLDV-SCNC: 21 MMOL/L (ref 21–28)
HCO3 BLDV-SCNC: 22 MMOL/L (ref 21–28)
HCT VFR BLD AUTO: 26.7 % (ref 40–53)
HGB BLD-MCNC: 8.6 G/DL (ref 13.3–17.7)
LMWH PPP CHRO-ACNC: 0.26 IU/ML
MAGNESIUM SERPL-MCNC: 2.7 MG/DL (ref 1.6–2.3)
MAGNESIUM SERPL-MCNC: 2.8 MG/DL (ref 1.6–2.3)
MCH RBC QN AUTO: 29.8 PG (ref 26.5–33)
MCHC RBC AUTO-ENTMCNC: 32.2 G/DL (ref 31.5–36.5)
MCV RBC AUTO: 92 FL (ref 78–100)
O2/TOTAL GAS SETTING VFR VENT: 21 %
O2/TOTAL GAS SETTING VFR VENT: ABNORMAL %
OXYHGB MFR BLDV: 54 %
OXYHGB MFR BLDV: 58 %
OXYHGB MFR BLDV: 61 %
OXYHGB MFR BLDV: 63 %
PCO2 BLDV: 34 MM HG (ref 40–50)
PCO2 BLDV: 36 MM HG (ref 40–50)
PCO2 BLDV: 37 MM HG (ref 40–50)
PCO2 BLDV: 38 MM HG (ref 40–50)
PH BLDV: 7.35 PH (ref 7.32–7.43)
PH BLDV: 7.36 PH (ref 7.32–7.43)
PH BLDV: 7.36 PH (ref 7.32–7.43)
PH BLDV: 7.37 PH (ref 7.32–7.43)
PHOSPHATE SERPL-MCNC: 5.4 MG/DL (ref 2.5–4.5)
PLATELET # BLD AUTO: 201 10E9/L (ref 150–450)
PO2 BLDV: 32 MM HG (ref 25–47)
PO2 BLDV: 34 MM HG (ref 25–47)
PO2 BLDV: 36 MM HG (ref 25–47)
PO2 BLDV: 37 MM HG (ref 25–47)
POTASSIUM SERPL-SCNC: 4 MMOL/L (ref 3.4–5.3)
POTASSIUM SERPL-SCNC: 4.2 MMOL/L (ref 3.4–5.3)
RBC # BLD AUTO: 2.89 10E12/L (ref 4.4–5.9)
SODIUM SERPL-SCNC: 130 MMOL/L (ref 133–144)
SODIUM SERPL-SCNC: 132 MMOL/L (ref 133–144)
TACROLIMUS BLD-MCNC: 6.1 UG/L (ref 5–15)
TME LAST DOSE: NORMAL H
WBC # BLD AUTO: 14.3 10E9/L (ref 4–11)

## 2019-03-03 PROCEDURE — 25000132 ZZH RX MED GY IP 250 OP 250 PS 637: Performed by: STUDENT IN AN ORGANIZED HEALTH CARE EDUCATION/TRAINING PROGRAM

## 2019-03-03 PROCEDURE — 25000131 ZZH RX MED GY IP 250 OP 636 PS 637: Performed by: INTERNAL MEDICINE

## 2019-03-03 PROCEDURE — 83735 ASSAY OF MAGNESIUM: CPT | Performed by: PHYSICIAN ASSISTANT

## 2019-03-03 PROCEDURE — 00000146 ZZHCL STATISTIC GLUCOSE BY METER IP

## 2019-03-03 PROCEDURE — 25000125 ZZHC RX 250: Performed by: STUDENT IN AN ORGANIZED HEALTH CARE EDUCATION/TRAINING PROGRAM

## 2019-03-03 PROCEDURE — 99291 CRITICAL CARE FIRST HOUR: CPT | Mod: GC | Performed by: INTERNAL MEDICINE

## 2019-03-03 PROCEDURE — 80197 ASSAY OF TACROLIMUS: CPT | Performed by: INTERNAL MEDICINE

## 2019-03-03 PROCEDURE — 80048 BASIC METABOLIC PNL TOTAL CA: CPT | Performed by: PHYSICIAN ASSISTANT

## 2019-03-03 PROCEDURE — 25000132 ZZH RX MED GY IP 250 OP 250 PS 637: Performed by: INTERNAL MEDICINE

## 2019-03-03 PROCEDURE — 82805 BLOOD GASES W/O2 SATURATION: CPT | Performed by: PHYSICIAN ASSISTANT

## 2019-03-03 PROCEDURE — 85027 COMPLETE CBC AUTOMATED: CPT | Performed by: PHYSICIAN ASSISTANT

## 2019-03-03 PROCEDURE — 40000048 ZZH STATISTIC DAILY SWAN MONITORING

## 2019-03-03 PROCEDURE — 25000125 ZZHC RX 250: Performed by: INTERNAL MEDICINE

## 2019-03-03 PROCEDURE — 25000132 ZZH RX MED GY IP 250 OP 250 PS 637: Performed by: ANESTHESIOLOGY

## 2019-03-03 PROCEDURE — 85520 HEPARIN ASSAY: CPT | Performed by: PHYSICIAN ASSISTANT

## 2019-03-03 PROCEDURE — 40000196 ZZH STATISTIC RAPCV CVP MONITORING

## 2019-03-03 PROCEDURE — 25000128 H RX IP 250 OP 636: Performed by: STUDENT IN AN ORGANIZED HEALTH CARE EDUCATION/TRAINING PROGRAM

## 2019-03-03 PROCEDURE — 25000125 ZZHC RX 250: Performed by: SURGERY

## 2019-03-03 PROCEDURE — 20000004 ZZH R&B ICU UMMC

## 2019-03-03 PROCEDURE — 25000132 ZZH RX MED GY IP 250 OP 250 PS 637: Performed by: NURSE PRACTITIONER

## 2019-03-03 PROCEDURE — 25000132 ZZH RX MED GY IP 250 OP 250 PS 637: Performed by: SURGERY

## 2019-03-03 PROCEDURE — 40000014 ZZH STATISTIC ARTERIAL MONITORING DAILY

## 2019-03-03 PROCEDURE — 71045 X-RAY EXAM CHEST 1 VIEW: CPT

## 2019-03-03 PROCEDURE — 84100 ASSAY OF PHOSPHORUS: CPT | Performed by: PHYSICIAN ASSISTANT

## 2019-03-03 RX ORDER — PENTAMIDINE ISETHIONATE 300 MG/300MG
300 INHALANT RESPIRATORY (INHALATION) ONCE
Status: DISCONTINUED | OUTPATIENT
Start: 2019-03-03 | End: 2019-03-03

## 2019-03-03 RX ORDER — SILDENAFIL CITRATE 20 MG/1
20 TABLET ORAL 3 TIMES DAILY
Status: DISCONTINUED | OUTPATIENT
Start: 2019-03-03 | End: 2019-03-29

## 2019-03-03 RX ORDER — PENTAMIDINE ISETHIONATE 300 MG/300MG
300 INHALANT RESPIRATORY (INHALATION) ONCE
Status: COMPLETED | OUTPATIENT
Start: 2019-03-04 | End: 2019-03-04

## 2019-03-03 RX ORDER — FUROSEMIDE 10 MG/ML
40 INJECTION INTRAMUSCULAR; INTRAVENOUS ONCE
Status: COMPLETED | OUTPATIENT
Start: 2019-03-03 | End: 2019-03-03

## 2019-03-03 RX ORDER — TACROLIMUS 1 MG/1
2 CAPSULE ORAL
Status: DISCONTINUED | OUTPATIENT
Start: 2019-03-03 | End: 2019-03-06

## 2019-03-03 RX ADMIN — SILDENAFIL 20 MG: 20 TABLET ORAL at 08:10

## 2019-03-03 RX ADMIN — SILDENAFIL 20 MG: 20 TABLET ORAL at 19:57

## 2019-03-03 RX ADMIN — TACROLIMUS 2 MG: 1 CAPSULE ORAL at 18:02

## 2019-03-03 RX ADMIN — DOBUTAMINE HYDROCHLORIDE 5 MCG/KG/MIN: 200 INJECTION INTRAVENOUS at 09:02

## 2019-03-03 RX ADMIN — OXYCODONE HYDROCHLORIDE 5 MG: 5 TABLET ORAL at 12:49

## 2019-03-03 RX ADMIN — LIDOCAINE 1 PATCH: 560 PATCH PERCUTANEOUS; TOPICAL; TRANSDERMAL at 08:11

## 2019-03-03 RX ADMIN — INSULIN ASPART 1 UNITS: 100 INJECTION, SOLUTION INTRAVENOUS; SUBCUTANEOUS at 12:53

## 2019-03-03 RX ADMIN — TERBUTALINE SULFATE 10 MG: 5 TABLET ORAL at 08:13

## 2019-03-03 RX ADMIN — MELATONIN TAB 3 MG 3 MG: 3 TAB at 21:56

## 2019-03-03 RX ADMIN — OXYCODONE HYDROCHLORIDE 5 MG: 5 TABLET ORAL at 00:00

## 2019-03-03 RX ADMIN — SILDENAFIL 20 MG: 20 TABLET ORAL at 15:49

## 2019-03-03 RX ADMIN — MYCOPHENOLATE MOFETIL 1500 MG: 500 TABLET ORAL at 18:02

## 2019-03-03 RX ADMIN — FLUTICASONE PROPIONATE 2 PUFF: 220 AEROSOL, METERED RESPIRATORY (INHALATION) at 19:58

## 2019-03-03 RX ADMIN — Medication 500000 UNITS: at 15:50

## 2019-03-03 RX ADMIN — OXYCODONE HYDROCHLORIDE 10 MG: 5 TABLET ORAL at 19:57

## 2019-03-03 RX ADMIN — MESALAMINE 2400 MG: 800 TABLET, DELAYED RELEASE ORAL at 08:13

## 2019-03-03 RX ADMIN — LEVOTHYROXINE SODIUM 100 MCG: 100 TABLET ORAL at 08:10

## 2019-03-03 RX ADMIN — MESALAMINE 2400 MG: 800 TABLET, DELAYED RELEASE ORAL at 19:57

## 2019-03-03 RX ADMIN — MYCOPHENOLATE MOFETIL 1500 MG: 500 TABLET ORAL at 08:10

## 2019-03-03 RX ADMIN — ACETAMINOPHEN 650 MG: 325 TABLET, FILM COATED ORAL at 03:53

## 2019-03-03 RX ADMIN — PREDNISONE 20 MG: 10 TABLET ORAL at 08:10

## 2019-03-03 RX ADMIN — VALGANCICLOVIR 450 MG: 450 TABLET, FILM COATED ORAL at 08:10

## 2019-03-03 RX ADMIN — ACETAMINOPHEN 650 MG: 325 TABLET, FILM COATED ORAL at 00:00

## 2019-03-03 RX ADMIN — FLUTICASONE PROPIONATE 2 PUFF: 220 AEROSOL, METERED RESPIRATORY (INHALATION) at 08:11

## 2019-03-03 RX ADMIN — FUROSEMIDE 40 MG: 10 INJECTION, SOLUTION INTRAVENOUS at 10:16

## 2019-03-03 RX ADMIN — OXYCODONE HYDROCHLORIDE 5 MG: 5 TABLET ORAL at 08:59

## 2019-03-03 RX ADMIN — SENNOSIDES AND DOCUSATE SODIUM 2 TABLET: 8.6; 5 TABLET ORAL at 19:56

## 2019-03-03 RX ADMIN — MONTELUKAST SODIUM 10 MG: 10 TABLET, COATED ORAL at 21:58

## 2019-03-03 RX ADMIN — PANTOPRAZOLE SODIUM 40 MG: 40 TABLET, DELAYED RELEASE ORAL at 08:11

## 2019-03-03 RX ADMIN — Medication 500000 UNITS: at 12:48

## 2019-03-03 RX ADMIN — TERBUTALINE SULFATE 10 MG: 5 TABLET ORAL at 15:49

## 2019-03-03 RX ADMIN — SENNOSIDES AND DOCUSATE SODIUM 2 TABLET: 8.6; 5 TABLET ORAL at 08:10

## 2019-03-03 RX ADMIN — ACETAMINOPHEN 650 MG: 325 TABLET, FILM COATED ORAL at 19:57

## 2019-03-03 RX ADMIN — Medication 500000 UNITS: at 19:58

## 2019-03-03 RX ADMIN — ROSUVASTATIN CALCIUM 10 MG: 10 TABLET, FILM COATED ORAL at 08:11

## 2019-03-03 RX ADMIN — TERBUTALINE SULFATE 10 MG: 5 TABLET ORAL at 23:58

## 2019-03-03 RX ADMIN — DULOXETINE 20 MG: 20 CAPSULE, DELAYED RELEASE ORAL at 08:10

## 2019-03-03 RX ADMIN — TACROLIMUS 1 MG: 1 CAPSULE ORAL at 08:10

## 2019-03-03 RX ADMIN — BUMETANIDE 1 MG/HR: 0.25 INJECTION INTRAMUSCULAR; INTRAVENOUS at 08:09

## 2019-03-03 RX ADMIN — Medication 500000 UNITS: at 08:11

## 2019-03-03 RX ADMIN — PREDNISONE 20 MG: 10 TABLET ORAL at 18:02

## 2019-03-03 RX ADMIN — ACETAMINOPHEN 650 MG: 325 TABLET, FILM COATED ORAL at 12:49

## 2019-03-03 RX ADMIN — FUROSEMIDE 10 MG/HR: 10 INJECTION, SOLUTION INTRAVENOUS at 10:05

## 2019-03-03 RX ADMIN — ACETAMINOPHEN 650 MG: 325 TABLET, FILM COATED ORAL at 08:59

## 2019-03-03 RX ADMIN — OXYCODONE HYDROCHLORIDE 10 MG: 5 TABLET ORAL at 03:53

## 2019-03-03 ASSESSMENT — PAIN DESCRIPTION - DESCRIPTORS
DESCRIPTORS: ACHING
DESCRIPTORS: ACHING;DISCOMFORT;SORE
DESCRIPTORS: ACHING;CONSTANT;DISCOMFORT
DESCRIPTORS: ACHING
DESCRIPTORS: ACHING;DISCOMFORT

## 2019-03-03 ASSESSMENT — ACTIVITIES OF DAILY LIVING (ADL)
ADLS_ACUITY_SCORE: 12
ADLS_ACUITY_SCORE: 11
ADLS_ACUITY_SCORE: 12

## 2019-03-03 ASSESSMENT — MIFFLIN-ST. JEOR: SCORE: 1426.5

## 2019-03-03 NOTE — PROGRESS NOTES
ADVANCED HEART FAILURE PROGRESS NOTE    SUBJECTIVE:  No overnight events. Reporting some generalized muscle aches and tenderness worse the last couple days.    ROS otherwise negative.    OBJECTIVE:  Vital signs:  Temp: 97.8  F (36.6  C) Temp  Min: 97.4  F (36.3  C)  Max: 97.8  F (36.6  C)  Heart Rate: 97 Heart Rate  Min: 93  Max: 101  BP: 102/56 Systolic (24hrs), Av , Min:89 , Max:108   Diastolic (24hrs), Av, Min:50, Max:83    Resp: 10 Resp  Min: 10  Max: 32  SpO2: 99 % SpO2  Min: 95 %  Max: 99 %      [place]: CVP 16, PA 36/18, PCW 16, PA sat 61%, CO/CI 5.0/2.9,       Intake/Output Summary (Last 24 hours) at 3/3/2019 0648  Last data filed at 3/3/2019 0600  Gross per 24 hour   Intake 1766.36 ml   Output 1370 ml   Net 396.36 ml       Vitals:    19 0400 19 0400 19 0400   Weight: 61.7 kg (136 lb 0.4 oz) 61.6 kg (135 lb 12.9 oz) 61.7 kg (136 lb 0.4 oz)       Physical Exam:  GEN:  Alert, interactive, appears comfortable, NAD.  HEENT:  [place] R internal jugular   CV:  Regular rate and rhythm, no murmur or JVD.   CHEST: Subclavian incision sutured.  Chest tubes.  LUNGS:  On room air, normal work of breathing, Clear to auscultation bilaterally, no wheezes or crackles.   ABD:  Active bowel sounds, soft, non-tender/non-distended.  No rebound/guarding/rigidity.  EXT:  No edema or cyanosis.  Hands/feet warm to touch with good signs of peripheral perfusion.   NEURO:  Alert and oriented, no new focal deficits appreciated.  PSCYH: Normal mood and affect      Medications:    bumetanide 1 mg/hr (19)     DOBUTamine 5 mcg/kg/min (19)     EPINEPHrine IV infusion ADULT Stopped (19)     HEParin 1,100 Units/hr (03/03/19 0700)     - MEDICATION INSTRUCTIONS -       - MEDICATION INSTRUCTIONS -       BETA BLOCKER NOT PRESCRIBED         Current Facility-Administered Medications   Medication Dose Route Frequency     bisacodyl  10 mg Rectal Daily     DULoxetine  20 mg Oral Daily      fluticasone  2 puff Inhalation Q12H     heparin lock flush  5 mL Intravenous Q24H     hydrALAZINE  25 mg Oral Q8H SHERRIE     insulin aspart  1-7 Units Subcutaneous TID AC     insulin aspart  1-5 Units Subcutaneous At Bedtime     levothyroxine  100 mcg Oral Daily     lidocaine  1 patch Transdermal Q24H     lidocaine   Transdermal Q8H     lidocaine   Transdermal Q24h     melatonin  3 mg Oral At Bedtime     mesalamine  2,400 mg Oral BID     montelukast  10 mg Oral At Bedtime     mycophenolate  1,500 mg Oral BID IS     nystatin  500,000 Units Oral 4x Daily     pantoprazole  40 mg Oral QAM AC     polyethylene glycol  17 g Oral Daily     predniSONE  20 mg Oral BID w/meals     rosuvastatin  10 mg Oral Daily     senna-docusate  1 tablet Oral BID    Or     senna-docusate  2 tablet Oral BID     sildenafil  10 mg Oral TID     sodium chloride (PF)  3 mL Intracatheter Q8H     tacrolimus  1 mg Oral BID IS     terbutaline  10 mg Oral Q8H     valGANciclovir  450 mg Oral Every Other Day         Labs:  [facility]  Recent Labs   Lab 03/03/19  0329 03/02/19  1954  03/02/19  0930  02/25/19  1502  02/24/19  1817   * 131*   < > 128*   < > 142   < > 144   POTASSIUM 4.0 4.1   < > 4.2   < > 4.5   < > 3.7   CHLORIDE 98 97   < > 94   < > 107   < > 106   CO2 19* 20   < > 21   < > 24   < > 23   BUN 70* 68*   < > 69*   < > 38*   < > 27   CR 2.12* 2.12*   < > 2.09*   < > 1.48*   < > 1.06   RADAMES 7.6* 7.5*   < > 7.8*   < > 8.9   < > 8.4*   MAG 2.7* 2.6*   < >  --    < > 3.0*   < > 3.0*   PHOS 5.4* 5.0*  --   --    < > 6.8*  --  2.7   * 130*   < > 160*   < > 120*   < > 130*   LDH  --   --   --   --   --   --   --  536*   ALKPHOS  --   --   --  87  --  89  --  96   BILITOTAL  --   --   --  0.9  --  1.4*  --  2.7*   AST  --   --   --  26  --  110*  --  92*   ALT  --   --   --  36  --  28  --  24    < > = values in this interval not displayed.     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     Updates today:  Filling pressures a little better. Reporting generalized muscle aches the last couple days, likely related to bumex drip. Will stop and switch to lasix drip. Continue dobutamine and increase sildenafil to 20 q8h for RV support. Increase tacrolimus to 2mg BID. Give pentamadine for PCP prophylaxis while holding Bactrim given renal function. Plan for first biopsy tomorrow.     #S/p OHT on 2/24:  Graft function:  - echo 3/1 shows normal LV function, RV is mild to moderately dilated and hypokinetic  - continue dobutamine at 5 for continue RV and hemodynamic support  - increase sildenafil to 20 q8h  - discontinue hydralazine given decreased BP  - switch bumex drip to lasix drip  - off isuprel, continue terbutaline 10 q8h     Immunosuppression:  - on MMF 1500 BID  - s/p methylpred 125 x 3 doses, continue prednisone 20mg BID, taper by 5 daily with negative biopsies  - NOT Simulect protocol, on tacro 2 BID, check level tomorrow     Prophylaxis:  - CMV +/+: medium risk, start renal dose Valcyte  - PCP: holding Bactrim for now, pentamadine given 3/3  - Thrush: start nystatin  - GI: on PPI  - CAV: start Crestor, holding ASA given bleeding at line sites     Biopsies:  - First biopsy Monday 3/4     Goretex conduit for persistent left SVC:  - will need anticoagulation, on low intensity heparin           Seen and staffed with Dr. Young.      Trev Harris MD  Advanced Heart Failure Fellow  [phone]

## 2019-03-03 NOTE — PLAN OF CARE
D/I/A: A&O x4, no neuro deficits, gave tyl & 5mg oxy x2 for rib/collar bone pain. Pulled art line, cuff BPs low/stable, maps in 60s, held hydralazine doses. HR sinus 90-100s. On RA during day. [place] at 56, see flow sheets for fausto numbers. Dobutamine gtt @ 5 mcg/kg/min, heparin gtt @ 1100 unit(s)/hr (therapeutic at 1000), bumex gtt @ 1 mg/hr started at 0900. Voided small amount in urinal (see I&Os), loose bm x1 on commode. Up in chair w/ assist x1.   P: Fausto q6h, update team with changes, monitor urine output, continue with plan of care.

## 2019-03-03 NOTE — PLAN OF CARE
53 y.o. M s/p OHT 02/24  Neuro: A&Ox 4, Intact.  CV: HR 90's-100's. SR/ST, no ectopy noted. JOSEPHINE q6hr, see flowsheet for numbers. CT continuing to drain serosang. Scheduled hydralazine held r/t hypotension.   Resp: LS clear/diminished, frequent productive cough. 2 Lpm overnight for comfort. Pt MATA.   : BUN and Cr continue to rise.Discussed with MD, lyte replacement protocol DC'd.   Plan: Continue to monitor hemodynamics. Address concerns with CVTS/CARDs 2.

## 2019-03-03 NOTE — PROGRESS NOTES
CV ICU PROGRESS NOTE  March 3, 2019      CO-MORBIDITIES:   Chronic systolic heart failure (H)  (primary encounter diagnosis)  Acute on chronic systolic congestive heart failure (H)    ASSESSMENT: Everton Larios is a 53 year old male with PMH etoh abuse, hypothyroidism, intermittent asthma, ulcerative colitis, Depression, Chronic HFrEF, NICM admitted with cardiogenic shock requiring subclavian balloon pump now s/p OHT 2/24/19 with Piyush House and Christopher.     TODAY'S PROGRESS:   - Switch to Lasix from Bumex per Cards  - Dobutamine increased to 5mcg/kg/hr   - Sildenafil increased  - Discontinue hydralazine     PLAN:  Neuro/ pain/ sedation:  - Monitor neurological status. Notify the MD for any acute changes in exam.  - Tylenol, oxycodone and PRN fentanyl     #Depression  PTA cymbalta 20mg  Melatonin for sleep     Pulmonary care:   - 2l NC  - Supp oxygen to maintain SpO2 >92%  - Encourage IS and deep breathing    #Intermittent asthma  - PTA Montelukast ordered    Cardiovascular:  HR goal >95. Persistent mild hypotension   - Monitor hemodynamic status.   - MAP >65. HR goal > 95.  - Increase dobutamine to 5 mcg/kg/min for RV support  - Increased sildenafil 20 q8h 3/3/2019   - Discontinue hydralazine  - continue terbutaline 10 mg q8h  - Holding ASA for bleeding around lines.   - Continue statin.     GI care:   #constipation  - Regular diet  - Senna, miralax and suppository for bowel regimen; MoM and miralax prn     #Intraperitoneal free air (CT obtained 2/26)  - Small peritoneal defect made during redo operation, closed with stitches intra-op- likely source of free air. No peritoneal signs, pain improved.     #Pierce's esophagus  #Ulcerative colitis  -PTA Mesalamine     Fluids/ Electrolytes/ Nutrition:   - TKO for IV fluid hydration  - No indication for parenteral nutrition.    Renal/ Fluid Balance:    - Urine output is adequate so far.  - Will continue to monitor intake and output.  -Lasix gtt for diuresis  - PTA  Tamsulosin      Endocrine:    # Stress hyperglycemia  - sliding scale insulin as needed  # hypothyroidism  - Home levothyroxine      ID/ Antibiotics:  - Perioperative antibiotics.   - Vancomycin and zosyn x 3 days- completed.  - Valcyte for CMV prophylaxis; holding bactrim for PCP prophylaxis.  - Nystatin for thrush     Heme:     #Bypass of persistent left SVC to R atrial appendage with [place]-Davis graft. Will require lifelong anti-coagulation for graft  - Hgb goal >7. Daily CBC.  - Low intensity heparin      Prophylaxis:    - Mechanical prophylaxis for DVT  - Heparin as above  - Protonix qd     Lines/ tubes/ drains:  - RIJ, [place], Arterial line, med/pleural chest tubes     Disposition:  - CV ICU.     Patient seen, findings and plan discussed with CV ICU staff, Dr. Mederos.    Nicolas Paredes MD  CA-3/PGY-4 Anesthesiology  [phone]  ====================================    SUBJECTIVE:   FRANTZ ON, pressures not improving on dobutamine 5    OBJECTIVE:   1. VITAL SIGNS:   Temp:  [97.4  F (36.3  C)-97.8  F (36.6  C)] 97.6  F (36.4  C)  Pulse:  [] 96  Heart Rate:  [] 96  Resp:  [10-45] 11  BP: ()/(50-62) 92/53  SpO2:  [95 %-99 %] 98 %  Resp: 11      2. INTAKE/ OUTPUT:   I/O last 3 completed shifts:  In: 1766.36 [P.O.:1080; I.V.:686.36]  Out: 1370 [Urine:780; Other:160; Chest Tube:430]    3. PHYSICAL EXAMINATION:     General: very pleasant, appropriately interactive   Neuro: Alert and   Resp: CTAB on 2L oxygen  CV: Regular rate, regular rhythm, serosanguinous output from chest tubes, trace pedal edema  Abdomen: Soft, Non-distended, non-tender  Incisions: c/d/i  Extremities: warm and well perfused    4. INVESTIGATIONS:   Arterial Blood Gases   Recent Labs   Lab 02/25/19  1717 02/25/19  1502 02/25/19  0918 02/25/19  0350   PH 7.32* 7.31* 7.33* 7.39   PCO2 36 51* 46* 42   PO2 110* 75* 125* 87   HCO3 19* 26 24 25     Complete Blood Count   Recent Labs   Lab 03/03/19  0329 03/02/19 1954 03/02/19  1721  03/02/19  0930   WBC 14.3* 12.8* 12.4* 11.2*   HGB 8.6* 8.7* 8.9* 8.9*    199 205 202     Basic Metabolic Panel  Recent Labs   Lab 03/03/19  0329 03/02/19  1954 03/02/19  1721 03/02/19  0930   * 131* 130* 128*   POTASSIUM 4.0 4.1 4.2 4.2   CHLORIDE 98 97 96 94   CO2 19* 20 23 21   BUN 70* 68* 73* 69*   CR 2.12* 2.12* 2.12* 2.09*   * 130* 105* 160*     Liver Function Tests  Recent Labs   Lab 03/02/19  0930 02/25/19  1502 02/24/19  1817 02/24/19  1556   AST 26 110* 92*  --    ALT 36 28 24  --    ALKPHOS 87 89 96  --    BILITOTAL 0.9 1.4* 2.7*  --    ALBUMIN 2.8* 3.4 2.4*  --    INR  --  1.29* 1.50* 1.57*     Pancreatic Enzymes  No lab results found in last 7 days.  Coagulation Profile  Recent Labs   Lab 02/27/19  0421 02/25/19  1502 02/24/19  1817 02/24/19  1556   INR  --  1.29* 1.50* 1.57*   PTT 38* 32 55* 38*         5. RADIOLOGY:   Recent Results (from the past 24 hour(s))   XR Chest Port 1 View    Narrative    Exam: XR CHEST PORT 1 VW, 3/3/2019 3:17 AM    Indication: confirm PA catheter position    Comparison: 3/2/2019    Findings:   AP view of the chest. [place]-Liz catheter with the tip in the right  main pulmonary artery. Postsurgical changes of the chest with intact  median sternotomy wires. Mediastinal and bilateral chest drains are  unchanged in position.    Cardiac silhouette is unchanged. Stable area of atelectasis in the  left lower lung. No new focal consolidations, pleural effusion or  pneumothorax. Upper abdomen is unremarkable.      Impression    Impression:   1. [place]-Liz catheter with the tip projects over the right main  pulmonary artery. Additional support devices and postsurgical changes  as detailed above.  2. Unchanged area of left basilar atelectasis. No new focal  consolidation.    I have personally reviewed the examination and initial interpretation  and I agree with the findings.    MICHAEL HOLDER MD       =========================================

## 2019-03-03 NOTE — PROGRESS NOTES
CVTS PROGRESS NOTE  March 3, 2019      CO-MORBIDITIES:   Chronic systolic heart failure (H)  (primary encounter diagnosis)  Acute on chronic systolic congestive heart failure (H)    ASSESSMENT: Everton Larios is a 53 year old male with PMH etoh abuse, hypothyroidism, intermittent asthma, ulcerative colitis, Depression, Chronic HFrEF, NICM admitted with cardiogenic shock requiring subclavian balloon pump now s/p OHT 2/24/19 with Piyush House and Christopher.     TODAY'S PROGRESS:   - Switch to Lasix from Bumex per Cards  - Dobutamine increased to 5mcg/kg/hr   - Sildenafil increased  - Discontinue hydralazine     PLAN:  Neuro/ pain/ sedation:  - Monitor neurological status. Notify the MD for any acute changes in exam.  - Tylenol, oxycodone and PRN fentanyl     #Depression  PTA cymbalta 20mg  Melatonin for sleep     Pulmonary care:   - 2l NC  - Supp oxygen to maintain SpO2 >92%  - Encourage IS and deep breathing    #Intermittent asthma  - PTA Montelukast ordered    Cardiovascular:  HR goal >95. Persistent mild hypotension   - Monitor hemodynamic status.   - MAP >65. HR goal > 95.  - Increase dobutamine to 5 mcg/kg/min for RV support  - Increased sildenafil 20 q8h 3/3/2019   - Discontinue hydralazine  - continue terbutaline 10 mg q8h  - Holding ASA for bleeding around lines.   - Continue statin.     GI care:   #constipation  - Regular diet  - Senna, miralax and suppository for bowel regimen; MoM and miralax prn     #Intraperitoneal free air (CT obtained 2/26)  - Small peritoneal defect made during redo operation, closed with stitches intra-op- likely source of free air. No peritoneal signs, pain improved.     #Pierce's esophagus  #Ulcerative colitis  -PTA Mesalamine     Fluids/ Electrolytes/ Nutrition:   - TKO for IV fluid hydration  - No indication for parenteral nutrition.    Renal/ Fluid Balance:    - Urine output is adequate so far.  - Will continue to monitor intake and output.  -Lasix gtt for diuresis  - PTA  Tamsulosin      Endocrine:    # Stress hyperglycemia  - sliding scale insulin as needed  # hypothyroidism  - Home levothyroxine      ID/ Antibiotics:  - Perioperative antibiotics.   - Vancomycin and zosyn x 3 days- completed.  - Valcyte for CMV prophylaxis; holding bactrim for PCP prophylaxis.  - Nystatin for thrush     Heme:     #Bypass of persistent left SVC to R atrial appendage with [place]-Davis graft. Will require lifelong anti-coagulation for graft  - Hgb goal >7. Daily CBC.  - Low intensity heparin      Prophylaxis:    - Mechanical prophylaxis for DVT  - Heparin as above  - Protonix qd     Lines/ tubes/ drains:  - RIJ, [place], Arterial line, med/pleural chest tubes     Disposition:  - CV ICU.     Patient seen, findings and plan discussed with CVTS Fellow    Nicolas Paredes MD  CA-3/PGY-4 Anesthesiology  [phone]  ====================================    SUBJECTIVE:   FRANTZ ON, pressures not improving on dobutamine 5    OBJECTIVE:   1. VITAL SIGNS:   Temp:  [97.4  F (36.3  C)-97.8  F (36.6  C)] 97.6  F (36.4  C)  Pulse:  [] 96  Heart Rate:  [] 96  Resp:  [10-45] 11  BP: ()/(50-62) 92/53  SpO2:  [95 %-99 %] 98 %  Resp: 11      2. INTAKE/ OUTPUT:   I/O last 3 completed shifts:  In: 1766.36 [P.O.:1080; I.V.:686.36]  Out: 1370 [Urine:780; Other:160; Chest Tube:430]    3. PHYSICAL EXAMINATION:     General: very pleasant, appropriately interactive   Neuro: Alert and   Resp: CTAB on 2L oxygen  CV: Regular rate, regular rhythm, serosanguinous output from chest tubes, trace pedal edema  Abdomen: Soft, Non-distended, non-tender  Incisions: c/d/i  Extremities: warm and well perfused    4. INVESTIGATIONS:   Arterial Blood Gases   Recent Labs   Lab 02/25/19  1717 02/25/19  1502 02/25/19  0918 02/25/19  0350   PH 7.32* 7.31* 7.33* 7.39   PCO2 36 51* 46* 42   PO2 110* 75* 125* 87   HCO3 19* 26 24 25     Complete Blood Count   Recent Labs   Lab 03/03/19  0329 03/02/19  1954 03/02/19  1721 03/02/19  0930   WBC  14.3* 12.8* 12.4* 11.2*   HGB 8.6* 8.7* 8.9* 8.9*    199 205 202     Basic Metabolic Panel  Recent Labs   Lab 03/03/19  0329 03/02/19  1954 03/02/19  1721 03/02/19  0930   * 131* 130* 128*   POTASSIUM 4.0 4.1 4.2 4.2   CHLORIDE 98 97 96 94   CO2 19* 20 23 21   BUN 70* 68* 73* 69*   CR 2.12* 2.12* 2.12* 2.09*   * 130* 105* 160*     Liver Function Tests  Recent Labs   Lab 03/02/19  0930 02/25/19  1502 02/24/19  1817 02/24/19  1556   AST 26 110* 92*  --    ALT 36 28 24  --    ALKPHOS 87 89 96  --    BILITOTAL 0.9 1.4* 2.7*  --    ALBUMIN 2.8* 3.4 2.4*  --    INR  --  1.29* 1.50* 1.57*     Pancreatic Enzymes  No lab results found in last 7 days.  Coagulation Profile  Recent Labs   Lab 02/27/19  0421 02/25/19  1502 02/24/19  1817 02/24/19  1556   INR  --  1.29* 1.50* 1.57*   PTT 38* 32 55* 38*         5. RADIOLOGY:   Recent Results (from the past 24 hour(s))   XR Chest Port 1 View    Narrative    Exam: XR CHEST PORT 1 VW, 3/3/2019 3:17 AM    Indication: confirm PA catheter position    Comparison: 3/2/2019    Findings:   AP view of the chest. [place]-Liz catheter with the tip in the right  main pulmonary artery. Postsurgical changes of the chest with intact  median sternotomy wires. Mediastinal and bilateral chest drains are  unchanged in position.    Cardiac silhouette is unchanged. Stable area of atelectasis in the  left lower lung. No new focal consolidations, pleural effusion or  pneumothorax. Upper abdomen is unremarkable.      Impression    Impression:   1. [place]-Liz catheter with the tip projects over the right main  pulmonary artery. Additional support devices and postsurgical changes  as detailed above.  2. Unchanged area of left basilar atelectasis. No new focal  consolidation.    I have personally reviewed the examination and initial interpretation  and I agree with the findings.    MICHAEL HOLDER MD       =========================================

## 2019-03-04 ENCOUNTER — APPOINTMENT (OUTPATIENT)
Dept: OCCUPATIONAL THERAPY | Facility: CLINIC | Age: 54
DRG: 001 | End: 2019-03-04
Attending: INTERNAL MEDICINE
Payer: COMMERCIAL

## 2019-03-04 ENCOUNTER — APPOINTMENT (OUTPATIENT)
Dept: GENERAL RADIOLOGY | Facility: CLINIC | Age: 54
DRG: 001 | End: 2019-03-04
Attending: PHYSICIAN ASSISTANT
Payer: COMMERCIAL

## 2019-03-04 LAB
ALBUMIN UR-MCNC: NEGATIVE MG/DL
ANION GAP SERPL CALCULATED.3IONS-SCNC: 15 MMOL/L (ref 3–14)
ANION GAP SERPL CALCULATED.3IONS-SCNC: 16 MMOL/L (ref 3–14)
APPEARANCE UR: CLEAR
BASE DEFICIT BLDV-SCNC: 6 MMOL/L
BASE DEFICIT BLDV-SCNC: 6.4 MMOL/L
BASE DEFICIT BLDV-SCNC: 6.8 MMOL/L
BASE DEFICIT BLDV-SCNC: 7.6 MMOL/L
BILIRUB UR QL STRIP: NEGATIVE
BUN SERPL-MCNC: 79 MG/DL (ref 7–30)
BUN SERPL-MCNC: 84 MG/DL (ref 7–30)
CA-I BLD-MCNC: 4.2 MG/DL (ref 4.4–5.2)
CALCIUM SERPL-MCNC: 7.7 MG/DL (ref 8.5–10.1)
CALCIUM SERPL-MCNC: 7.8 MG/DL (ref 8.5–10.1)
CHLORIDE SERPL-SCNC: 96 MMOL/L (ref 94–109)
CHLORIDE SERPL-SCNC: 96 MMOL/L (ref 94–109)
CO2 SERPL-SCNC: 18 MMOL/L (ref 20–32)
CO2 SERPL-SCNC: 18 MMOL/L (ref 20–32)
COLOR UR AUTO: YELLOW
CREAT SERPL-MCNC: 2.31 MG/DL (ref 0.66–1.25)
CREAT SERPL-MCNC: 2.63 MG/DL (ref 0.66–1.25)
ERYTHROCYTE [DISTWIDTH] IN BLOOD BY AUTOMATED COUNT: 14.6 % (ref 10–15)
ERYTHROCYTE [DISTWIDTH] IN BLOOD BY AUTOMATED COUNT: 14.7 % (ref 10–15)
GFR SERPL CREATININE-BSD FRML MDRD: 26 ML/MIN/{1.73_M2}
GFR SERPL CREATININE-BSD FRML MDRD: 30 ML/MIN/{1.73_M2}
GLUCOSE BLDC GLUCOMTR-MCNC: 105 MG/DL (ref 70–99)
GLUCOSE BLDC GLUCOMTR-MCNC: 113 MG/DL (ref 70–99)
GLUCOSE BLDC GLUCOMTR-MCNC: 117 MG/DL (ref 70–99)
GLUCOSE BLDC GLUCOMTR-MCNC: 118 MG/DL (ref 70–99)
GLUCOSE BLDC GLUCOMTR-MCNC: 98 MG/DL (ref 70–99)
GLUCOSE SERPL-MCNC: 108 MG/DL (ref 70–99)
GLUCOSE SERPL-MCNC: 119 MG/DL (ref 70–99)
GLUCOSE UR STRIP-MCNC: NEGATIVE MG/DL
HCO3 BLDV-SCNC: 18 MMOL/L (ref 21–28)
HCO3 BLDV-SCNC: 18 MMOL/L (ref 21–28)
HCO3 BLDV-SCNC: 19 MMOL/L (ref 21–28)
HCO3 BLDV-SCNC: 20 MMOL/L (ref 21–28)
HCT VFR BLD AUTO: 25.8 % (ref 40–53)
HCT VFR BLD AUTO: 26 % (ref 40–53)
HGB BLD-MCNC: 8.4 G/DL (ref 13.3–17.7)
HGB BLD-MCNC: 8.5 G/DL (ref 13.3–17.7)
HGB UR QL STRIP: NEGATIVE
HYALINE CASTS #/AREA URNS LPF: 17 /LPF (ref 0–2)
INTERPRETATION ECG - MUSE: NORMAL
INTERPRETATION ECG - MUSE: NORMAL
KETONES UR STRIP-MCNC: NEGATIVE MG/DL
LACTATE BLD-SCNC: 0.6 MMOL/L (ref 0.7–2)
LEUKOCYTE ESTERASE UR QL STRIP: NEGATIVE
LMWH PPP CHRO-ACNC: 0.39 IU/ML
MAGNESIUM SERPL-MCNC: 2.7 MG/DL (ref 1.6–2.3)
MAGNESIUM SERPL-MCNC: 2.7 MG/DL (ref 1.6–2.3)
MCH RBC QN AUTO: 30.2 PG (ref 26.5–33)
MCH RBC QN AUTO: 30.2 PG (ref 26.5–33)
MCHC RBC AUTO-ENTMCNC: 32.6 G/DL (ref 31.5–36.5)
MCHC RBC AUTO-ENTMCNC: 32.7 G/DL (ref 31.5–36.5)
MCV RBC AUTO: 93 FL (ref 78–100)
MCV RBC AUTO: 93 FL (ref 78–100)
NITRATE UR QL: NEGATIVE
O2/TOTAL GAS SETTING VFR VENT: 21 %
O2/TOTAL GAS SETTING VFR VENT: ABNORMAL %
OXYHGB MFR BLDV: 57 %
OXYHGB MFR BLDV: 59 %
OXYHGB MFR BLDV: 62 %
OXYHGB MFR BLDV: 65 %
PCO2 BLDV: 33 MM HG (ref 40–50)
PCO2 BLDV: 35 MM HG (ref 40–50)
PCO2 BLDV: 35 MM HG (ref 40–50)
PCO2 BLDV: 39 MM HG (ref 40–50)
PH BLDV: 7.31 PH (ref 7.32–7.43)
PH BLDV: 7.32 PH (ref 7.32–7.43)
PH BLDV: 7.34 PH (ref 7.32–7.43)
PH BLDV: 7.35 PH (ref 7.32–7.43)
PH UR STRIP: 5 PH (ref 5–7)
PHOSPHATE SERPL-MCNC: 6.7 MG/DL (ref 2.5–4.5)
PLATELET # BLD AUTO: 214 10E9/L (ref 150–450)
PLATELET # BLD AUTO: 220 10E9/L (ref 150–450)
PO2 BLDV: 35 MM HG (ref 25–47)
PO2 BLDV: 35 MM HG (ref 25–47)
PO2 BLDV: 37 MM HG (ref 25–47)
PO2 BLDV: 37 MM HG (ref 25–47)
POTASSIUM SERPL-SCNC: 4.1 MMOL/L (ref 3.4–5.3)
POTASSIUM SERPL-SCNC: 4.2 MMOL/L (ref 3.4–5.3)
RBC # BLD AUTO: 2.78 10E12/L (ref 4.4–5.9)
RBC # BLD AUTO: 2.81 10E12/L (ref 4.4–5.9)
RBC #/AREA URNS AUTO: 0 /HPF (ref 0–2)
SODIUM SERPL-SCNC: 129 MMOL/L (ref 133–144)
SODIUM SERPL-SCNC: 130 MMOL/L (ref 133–144)
SOURCE: ABNORMAL
SP GR UR STRIP: 1.01 (ref 1–1.03)
TACROLIMUS BLD-MCNC: 7.7 UG/L (ref 5–15)
TME LAST DOSE: NORMAL H
UROBILINOGEN UR STRIP-MCNC: NORMAL MG/DL (ref 0–2)
WBC # BLD AUTO: 17.7 10E9/L (ref 4–11)
WBC # BLD AUTO: 18.9 10E9/L (ref 4–11)
WBC #/AREA URNS AUTO: 0 /HPF (ref 0–5)

## 2019-03-04 PROCEDURE — 25000132 ZZH RX MED GY IP 250 OP 250 PS 637: Performed by: ANESTHESIOLOGY

## 2019-03-04 PROCEDURE — 40000196 ZZH STATISTIC RAPCV CVP MONITORING

## 2019-03-04 PROCEDURE — 99232 SBSQ HOSP IP/OBS MODERATE 35: CPT | Mod: GC | Performed by: ANESTHESIOLOGY

## 2019-03-04 PROCEDURE — 97535 SELF CARE MNGMENT TRAINING: CPT | Mod: GO | Performed by: OCCUPATIONAL THERAPIST

## 2019-03-04 PROCEDURE — 25000131 ZZH RX MED GY IP 250 OP 636 PS 637: Performed by: INTERNAL MEDICINE

## 2019-03-04 PROCEDURE — 93505 ENDOMYOCARDIAL BIOPSY: CPT | Mod: 26 | Performed by: INTERNAL MEDICINE

## 2019-03-04 PROCEDURE — 85027 COMPLETE CBC AUTOMATED: CPT | Performed by: PHYSICIAN ASSISTANT

## 2019-03-04 PROCEDURE — 81001 URINALYSIS AUTO W/SCOPE: CPT | Performed by: INTERNAL MEDICINE

## 2019-03-04 PROCEDURE — 80048 BASIC METABOLIC PNL TOTAL CA: CPT | Performed by: PHYSICIAN ASSISTANT

## 2019-03-04 PROCEDURE — 83735 ASSAY OF MAGNESIUM: CPT | Performed by: PHYSICIAN ASSISTANT

## 2019-03-04 PROCEDURE — 25000132 ZZH RX MED GY IP 250 OP 250 PS 637: Performed by: NURSE PRACTITIONER

## 2019-03-04 PROCEDURE — 20000004 ZZH R&B ICU UMMC

## 2019-03-04 PROCEDURE — 25000132 ZZH RX MED GY IP 250 OP 250 PS 637: Performed by: STUDENT IN AN ORGANIZED HEALTH CARE EDUCATION/TRAINING PROGRAM

## 2019-03-04 PROCEDURE — 99291 CRITICAL CARE FIRST HOUR: CPT | Mod: 25 | Performed by: INTERNAL MEDICINE

## 2019-03-04 PROCEDURE — 80197 ASSAY OF TACROLIMUS: CPT | Performed by: INTERNAL MEDICINE

## 2019-03-04 PROCEDURE — C1894 INTRO/SHEATH, NON-LASER: HCPCS | Performed by: INTERNAL MEDICINE

## 2019-03-04 PROCEDURE — 40000275 ZZH STATISTIC RCP TIME EA 10 MIN

## 2019-03-04 PROCEDURE — 40000048 ZZH STATISTIC DAILY SWAN MONITORING

## 2019-03-04 PROCEDURE — 36415 COLL VENOUS BLD VENIPUNCTURE: CPT | Performed by: INTERNAL MEDICINE

## 2019-03-04 PROCEDURE — 87070 CULTURE OTHR SPECIMN AEROBIC: CPT | Performed by: INTERNAL MEDICINE

## 2019-03-04 PROCEDURE — 25000125 ZZHC RX 250: Performed by: INTERNAL MEDICINE

## 2019-03-04 PROCEDURE — 84100 ASSAY OF PHOSPHORUS: CPT | Performed by: PHYSICIAN ASSISTANT

## 2019-03-04 PROCEDURE — 71045 X-RAY EXAM CHEST 1 VIEW: CPT

## 2019-03-04 PROCEDURE — 27210794 ZZH OR GENERAL SUPPLY STERILE: Performed by: INTERNAL MEDICINE

## 2019-03-04 PROCEDURE — 93505 ENDOMYOCARDIAL BIOPSY: CPT | Performed by: INTERNAL MEDICINE

## 2019-03-04 PROCEDURE — 25000132 ZZH RX MED GY IP 250 OP 250 PS 637: Performed by: INTERNAL MEDICINE

## 2019-03-04 PROCEDURE — 88350 IMFLUOR EA ADDL 1ANTB STN PX: CPT | Performed by: THORACIC SURGERY (CARDIOTHORACIC VASCULAR SURGERY)

## 2019-03-04 PROCEDURE — 25000128 H RX IP 250 OP 636: Performed by: RADIOLOGY

## 2019-03-04 PROCEDURE — 83605 ASSAY OF LACTIC ACID: CPT | Performed by: INTERNAL MEDICINE

## 2019-03-04 PROCEDURE — 88307 TISSUE EXAM BY PATHOLOGIST: CPT | Performed by: THORACIC SURGERY (CARDIOTHORACIC VASCULAR SURGERY)

## 2019-03-04 PROCEDURE — 00000146 ZZHCL STATISTIC GLUCOSE BY METER IP

## 2019-03-04 PROCEDURE — 97110 THERAPEUTIC EXERCISES: CPT | Mod: GO | Performed by: OCCUPATIONAL THERAPIST

## 2019-03-04 PROCEDURE — 82805 BLOOD GASES W/O2 SATURATION: CPT | Performed by: PHYSICIAN ASSISTANT

## 2019-03-04 PROCEDURE — 82330 ASSAY OF CALCIUM: CPT | Performed by: PHYSICIAN ASSISTANT

## 2019-03-04 PROCEDURE — 25000128 H RX IP 250 OP 636: Performed by: SURGERY

## 2019-03-04 PROCEDURE — 25000128 H RX IP 250 OP 636: Performed by: STUDENT IN AN ORGANIZED HEALTH CARE EDUCATION/TRAINING PROGRAM

## 2019-03-04 PROCEDURE — 25000132 ZZH RX MED GY IP 250 OP 250 PS 637: Performed by: SURGERY

## 2019-03-04 PROCEDURE — 85520 HEPARIN ASSAY: CPT | Performed by: PHYSICIAN ASSISTANT

## 2019-03-04 PROCEDURE — 40000014 ZZH STATISTIC ARTERIAL MONITORING DAILY

## 2019-03-04 PROCEDURE — 87040 BLOOD CULTURE FOR BACTERIA: CPT | Performed by: INTERNAL MEDICINE

## 2019-03-04 PROCEDURE — 88346 IMFLUOR 1ST 1ANTB STAIN PX: CPT | Performed by: THORACIC SURGERY (CARDIOTHORACIC VASCULAR SURGERY)

## 2019-03-04 PROCEDURE — 82805 BLOOD GASES W/O2 SATURATION: CPT | Performed by: INTERNAL MEDICINE

## 2019-03-04 RX ADMIN — FENTANYL CITRATE 50 MCG: 50 INJECTION, SOLUTION INTRAMUSCULAR; INTRAVENOUS at 18:05

## 2019-03-04 RX ADMIN — SENNOSIDES AND DOCUSATE SODIUM 2 TABLET: 8.6; 5 TABLET ORAL at 19:58

## 2019-03-04 RX ADMIN — Medication 500000 UNITS: at 21:40

## 2019-03-04 RX ADMIN — OXYCODONE HYDROCHLORIDE 10 MG: 5 TABLET ORAL at 19:58

## 2019-03-04 RX ADMIN — FLUTICASONE PROPIONATE 2 PUFF: 220 AEROSOL, METERED RESPIRATORY (INHALATION) at 20:02

## 2019-03-04 RX ADMIN — DOBUTAMINE HYDROCHLORIDE 5 MCG/KG/MIN: 200 INJECTION INTRAVENOUS at 16:01

## 2019-03-04 RX ADMIN — HEPARIN SODIUM 1100 UNITS/HR: 10000 INJECTION, SOLUTION INTRAVENOUS at 00:02

## 2019-03-04 RX ADMIN — OXYCODONE HYDROCHLORIDE 5 MG: 5 TABLET ORAL at 12:31

## 2019-03-04 RX ADMIN — Medication 500000 UNITS: at 12:32

## 2019-03-04 RX ADMIN — SILDENAFIL 20 MG: 20 TABLET ORAL at 14:46

## 2019-03-04 RX ADMIN — FLUTICASONE PROPIONATE 2 PUFF: 220 AEROSOL, METERED RESPIRATORY (INHALATION) at 07:52

## 2019-03-04 RX ADMIN — MONTELUKAST SODIUM 10 MG: 10 TABLET, COATED ORAL at 21:40

## 2019-03-04 RX ADMIN — Medication 5 ML: at 18:58

## 2019-03-04 RX ADMIN — PREDNISONE 20 MG: 10 TABLET ORAL at 07:51

## 2019-03-04 RX ADMIN — DULOXETINE 20 MG: 20 CAPSULE, DELAYED RELEASE ORAL at 07:51

## 2019-03-04 RX ADMIN — SILDENAFIL 20 MG: 20 TABLET ORAL at 19:58

## 2019-03-04 RX ADMIN — SENNOSIDES AND DOCUSATE SODIUM 2 TABLET: 8.6; 5 TABLET ORAL at 07:51

## 2019-03-04 RX ADMIN — LIDOCAINE 1 PATCH: 560 PATCH PERCUTANEOUS; TOPICAL; TRANSDERMAL at 07:50

## 2019-03-04 RX ADMIN — SILDENAFIL 20 MG: 20 TABLET ORAL at 07:50

## 2019-03-04 RX ADMIN — ROSUVASTATIN CALCIUM 10 MG: 10 TABLET, FILM COATED ORAL at 07:51

## 2019-03-04 RX ADMIN — LEVOTHYROXINE SODIUM 100 MCG: 100 TABLET ORAL at 07:50

## 2019-03-04 RX ADMIN — PENTAMIDINE ISETHIONATE 300 MG: 300 INHALANT RESPIRATORY (INHALATION) at 15:43

## 2019-03-04 RX ADMIN — MELATONIN TAB 3 MG 3 MG: 3 TAB at 19:58

## 2019-03-04 RX ADMIN — TACROLIMUS 2 MG: 1 CAPSULE ORAL at 18:56

## 2019-03-04 RX ADMIN — PANTOPRAZOLE SODIUM 40 MG: 40 TABLET, DELAYED RELEASE ORAL at 07:50

## 2019-03-04 RX ADMIN — BENZOCAINE, MENTHOL 1 LOZENGE: 15; 3.6 LOZENGE ORAL at 21:40

## 2019-03-04 RX ADMIN — TACROLIMUS 2 MG: 1 CAPSULE ORAL at 07:51

## 2019-03-04 RX ADMIN — TERBUTALINE SULFATE 10 MG: 5 TABLET ORAL at 07:54

## 2019-03-04 RX ADMIN — MYCOPHENOLATE MOFETIL 1500 MG: 500 TABLET ORAL at 07:51

## 2019-03-04 RX ADMIN — MYCOPHENOLATE MOFETIL 1500 MG: 500 TABLET ORAL at 18:56

## 2019-03-04 RX ADMIN — ACETAMINOPHEN 650 MG: 325 TABLET, FILM COATED ORAL at 04:59

## 2019-03-04 RX ADMIN — MESALAMINE 2400 MG: 800 TABLET, DELAYED RELEASE ORAL at 21:40

## 2019-03-04 RX ADMIN — PREDNISONE 20 MG: 10 TABLET ORAL at 18:57

## 2019-03-04 RX ADMIN — TERBUTALINE SULFATE 10 MG: 5 TABLET ORAL at 16:01

## 2019-03-04 RX ADMIN — MESALAMINE 2400 MG: 800 TABLET, DELAYED RELEASE ORAL at 07:53

## 2019-03-04 RX ADMIN — OXYCODONE HYDROCHLORIDE 10 MG: 5 TABLET ORAL at 04:59

## 2019-03-04 RX ADMIN — Medication 500000 UNITS: at 07:53

## 2019-03-04 ASSESSMENT — ACTIVITIES OF DAILY LIVING (ADL)
ADLS_ACUITY_SCORE: 11

## 2019-03-04 ASSESSMENT — PAIN DESCRIPTION - DESCRIPTORS
DESCRIPTORS: SORE
DESCRIPTORS: ACHING
DESCRIPTORS: SORE

## 2019-03-04 NOTE — PLAN OF CARE
OT/CR 4E: Discharge Planner OT   Patient plan for discharge: rehab  Current status: Pt is alert and appropriately conversational. Able to recall sternal precautions; however, requires cues for compliance throughout session.  Min A for bed mobility and sit to stand transfers.  Tolerates ambulating approx 150ft + 100ft with seated rest between bouts and 1-2 standing rest breaks during each bout.  Primary limitation is SOB - though O2 sats 100% on 2L o2 via nc.  Barriers to return to prior living situation: weakness, deconditioning, SOB, post surgical precautions, acute medical needs  Recommendations for discharge: ARU  Rationale for recommendations: Pt is currently below baseline with regards to mobility and independence with self cares and will benefit from continued skilled therapy intervention to address deficits.         Entered by: Polina Horton 03/04/2019 2:45 PM

## 2019-03-04 NOTE — PLAN OF CARE
Stable vitals over the day. NPO for heart cath/ biopsy. Taken to cath lab at 1620. Lasix drip discontinued. Heparin paused at 0945 for cath. Walked half way around unit with PT. Up to chair x2. [place] removed and tip cultured. Plan to get new central line/ swan in cath lab. Pt continues on dobutamine at 5.

## 2019-03-04 NOTE — PROGRESS NOTES
ADVANCED HEART FAILURE PROGRESS NOTE    SUBJECTIVE:  No acute events, feels about the same this morning, no acute complaints.    ROS otherwise negative.    OBJECTIVE:  Vital signs:  Temp: 97.6  F (36.4  C) Temp  Min: 97.6  F (36.4  C)  Max: 97.8  F (36.6  C)  Heart Rate: 93 Heart Rate  Min: 93  Max: 99  BP: 98/55 Systolic (24hrs), Av , Min:83 , Max:104   Diastolic (24hrs), Av, Min:48, Max:63    Resp: 12 Resp  Min: 9  Max: 45  SpO2: 100 % SpO2  Min: 96 %  Max: 100 %    [place] :RA 12, PA 34/16, PCW 16, PA sat 65%, CO/CI 5.3/3.1,       Intake/Output Summary (Last 24 hours) at 3/4/2019 0643  Last data filed at 3/4/2019 0500  Gross per 24 hour   Intake 823.56 ml   Output 1065 ml   Net -241.44 ml       Vitals:    19   Weight: 61.7 kg (136 lb 0.4 oz) 61.6 kg (135 lb 12.9 oz) 61.7 kg (136 lb 0.4 oz)       Physical Exam:  GEN:  Alert, interactive, appears comfortable, NAD.  HEENT:  [place] R internal jugular with saturated bandage  CV:  Regular rate and rhythm, no murmur or JVD.   CHEST: Subclavian incision sutured.  Chest tubes.  LUNGS:  On room air, normal work of breathing, Clear to auscultation bilaterally, no wheezes or crackles.   ABD:  Active bowel sounds, soft, non-tender/non-distended.  No rebound/guarding/rigidity.  EXT:  No edema or cyanosis.  Hands/feet warm to touch with good signs of peripheral perfusion.   NEURO:  Alert and oriented, no new focal deficits appreciated.  PSCYH: Normal mood and affect      Medications:    DOBUTamine 5 mcg/kg/min (19)     furosemide 5 mg/hr (19)     HEParin 950 Units/hr (19)     - MEDICATION INSTRUCTIONS -       - MEDICATION INSTRUCTIONS -       BETA BLOCKER NOT PRESCRIBED         Current Facility-Administered Medications   Medication Dose Route Frequency     bisacodyl  10 mg Rectal Daily     DULoxetine  20 mg Oral Daily     fluticasone  2 puff Inhalation Q12H     heparin lock flush  5 mL Intravenous  Q24H     insulin aspart  1-7 Units Subcutaneous TID AC     insulin aspart  1-5 Units Subcutaneous At Bedtime     levothyroxine  100 mcg Oral Daily     lidocaine  1 patch Transdermal Q24H     lidocaine   Transdermal Q8H     lidocaine   Transdermal Q24h     melatonin  3 mg Oral At Bedtime     mesalamine  2,400 mg Oral BID     montelukast  10 mg Oral At Bedtime     mycophenolate  1,500 mg Oral BID IS     nystatin  500,000 Units Oral 4x Daily     pantoprazole  40 mg Oral QAM AC     pentamidine  300 mg Inhalation Once     polyethylene glycol  17 g Oral Daily     predniSONE  20 mg Oral BID w/meals     rosuvastatin  10 mg Oral Daily     senna-docusate  1 tablet Oral BID    Or     senna-docusate  2 tablet Oral BID     sildenafil  20 mg Oral TID     sodium chloride (PF)  3 mL Intracatheter Q8H     tacrolimus  2 mg Oral BID IS     terbutaline  10 mg Oral Q8H     valGANciclovir  450 mg Oral Every Other Day         Labs:  CMP  Recent Labs   Lab 03/04/19  0412 03/03/19  1548 03/03/19  0329  03/02/19  0930  02/25/19  1502   * 130* 132*   < > 128*   < > 142   POTASSIUM 4.2 4.2 4.0   < > 4.2   < > 4.5   CHLORIDE 96 97 98   < > 94   < > 107   CO2 18* 21 19*   < > 21   < > 24   BUN 79* 73* 70*   < > 69*   < > 38*   CR 2.63* 2.34* 2.12*   < > 2.09*   < > 1.48*   RADAMES 7.8* 7.8* 7.6*   < > 7.8*   < > 8.9   MAG 2.7* 2.8* 2.7*   < >  --    < > 3.0*   PHOS 6.7*  --  5.4*   < >  --    < > 6.8*   * 111* 114*   < > 160*   < > 120*   ALKPHOS  --   --   --   --  87  --  89   BILITOTAL  --   --   --   --  0.9  --  1.4*   AST  --   --   --   --  26  --  110*   ALT  --   --   --   --  36  --  28    < > = values in this interval not displayed.     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 Post-op complicated by RV dysfunction and FRANKLIN.     Updates today:  WBC elevated today with no fevers or localizing symptoms of infection. Cultures drawn and will plan to switch swan catheter to left internal jugular with biopsy today. Creatinine continues to rise in the setting of reduced filling pressures. It is possible he may need slightly higher filling pressures due to stiff graft and RV dysfunction. Will hold diuretic for now and continue to monitor.        #S/p OHT on 2/24:  Graft function:  - echo 3/1 shows normal LV function, RV is mild to moderately dilated and hypokinetic  - continue dobutamine at 5 for continue RV and hemodynamic support  - continue sildenafil 20 q8h  - holding diuretic for now, will continue to monitor filling pressures  - off isuprel, continue terbutaline 10 q8h     Immunosuppression:  - on MMF 1500 BID  - s/p methylpred 125 x 3 doses, continue prednisone 20mg BID, taper by 5 daily with negative biopsies  - NOT Simulect protocol, on tacro 2 BID, level pending     Prophylaxis:  - CMV +/+: medium risk, start renal dose Valcyte  - PCP: holding Bactrim for now, pentamadine given 3/3  - Thrush: start nystatin  - GI: on PPI  - CAV: start Crestor, holding ASA given bleeding at line sites     Biopsies:  - First biopsy Monday 3/4     Goretex conduit for persistent left SVC:  - will need anticoagulation, on low intensity heparin        Seen and staffed with Dr. Shilo Harris MD  Advanced Heart Failure Fellow  [phone]    I have reviewed today's vital signs, notes, medications, labs and imaging.  I have also seen and examined the patient and agree with the findings and plan as outlined above.  Pt without new complaints.  97.6, HR 95, RR 16, 89/71 with CVP 12, CO5.3, PCW 14-16 on Dbx 5 mcg/kg/min and  lasix gtt.  Lungs clear and tachy S1 and S2 with no gallop.  Labs with Cr 2.63 and WBC 18.9.  Assessment: Pt with OHT on 2-14-19 with course complicated by mild RV dysfn on sidenafil and Dbx.  FRANKLIN most likely due to diuresis and vol status.  Leukocytosis of unclear source will remove PA catheter and replace today at the time of Bx.  Pt with OHT with course complicated by RV dysfn, FRANKLIN and on IS.  Total critical care time 45 min.     Stevie Lao MD, PhD  Professor, Heart Failure and Cardiac Transplantation  [facility]

## 2019-03-04 NOTE — PROGRESS NOTES
CLINICAL NUTRITION SERVICES - REASSESSMENT NOTE     Nutrition Prescription    RECOMMENDATIONS FOR MDs/PROVIDERS TO ORDER:  None at this time    Malnutrition Status:    Non-severe malnutrition in the context of acute illness    Recommendations already ordered by Registered Dietitian (RD):  None today    Future/Additional Recommendations:  1. If pt with poor PO intake once diet advanced, offer ONS and initiate calorie counts     2. If enteral nutrition indicated, would recommend:  -Nutren 1.5 @ 10 mL/hr  -If pt tolerates, adv TF per MD discretion to goal 55 mL/hr to provide 1980 kcals (36 kcal/kg/day), 90 g PRO (1.6 g/kg/day), 1003 mL H2O, 232 g CHO and no fiber daily.  -Order multivitamin/mineral (15 ml/day via FT) to help ensure micronutrient needs being met with suspected hypermetabolic demands and potential interruptions to TF infusions.  -30 mL water flush q4h for tube patency     EVALUATION OF THE PROGRESS TOWARD GOALS   Diet: Regular diet; currently NPO for heart biopsy    Intake: Pt eating % of meals with good appetite per RN charting       NEW FINDINGS   Respiratory: Pt extubated on 2/25 and is doing well since then.    GI: Pt has been tolerating PO diet and stooling appropriately (0-4 BMs daily)    Labs (3/4): Na = 130 (L), BUN = 79 (H), Creatinine = 2.63 (H), Phos = 6.7 (H)    Weight: Most recent weight of 61.7 kg on 3/3 is up from lowest admit wt of 54.7 kg likely r/t fluid    MALNUTRITION  % Intake: Decreased intake does not meet criteria  % Weight Loss: > 5% in 1 month (severe)  Subcutaneous Fat Loss: Facial region, Upper arm, Lower arm and Thoracic/intercostal:  Mild  Muscle Loss: Temporal, Thoracic region (clavicle, acromium bone, deltoid, trapezius, pectoral), Upper arm (bicep, tricep, Lower arm  (forearm), Upper leg (quadricep, hamstring), Patellar region and Posterior calf:  Mild-Moderate  Fluid Accumulation/Edema: Mild  Malnutrition Diagnosis: Non-severe malnutrition in the context of acute  illness    Previous Goals   Diet adv v nutrition support within 1-2 days.  Evaluation: Met    Total avg nutritional intake to meet a minimum of 30 kcal/kg and 1.5 g PRO/kg daily (per dosing wt 55 kg).  Evaluation: Unable to evaluate    Previous Nutrition Diagnosis  Inadequate protein-energy intake related to intubation as evidenced by NPO day 2 without plan to start TFs in hopes of extubation  Evaluation: Improving    CURRENT NUTRITION DIAGNOSIS  Predicted inadequate nutrient intake (protein/energy) related to potential for decreased appetite during hospital LOS      INTERVENTIONS  Implementation  Collaboration and Referral of Nutrition care - Discussed plan for FEN/GI on rounds with Providers  Enteral Nutrition - Recs    Goals  Patient to consume % of nutritionally adequate meal trays TID, or the equivalent with supplements/snacks.    Monitoring/Evaluation  Progress toward goals will be monitored and evaluated per protocol.    Jazzy Balbuena, RD, LD  SICU RD Pgr: [phone]

## 2019-03-04 NOTE — PLAN OF CARE
D/I/A: A&O x4, no neuro deficits, gave tyl & 5mg oxy x2 for left side/generalized pain. BPs low/stable, maps in 60s. HR sinus 90-100s. On RA during day. [place] at 56, see flow sheets for fausto numbers. Dobutamine gtt @ 5 mcg/kg/min, heparin gtt @ 1100 unit(s)/hr, discontinued bumex gtt at 1000 due to generalized pain side effect, switched to lasix gtt @ 10 mg/hr and decreased to 5 mg/hr at 1800. 5 CTs, putting out moderate amount serosanguinous fluid (see I&Os). Voided small amount in urinal (see I&Os). Up in chair w/ assist x1-2.   P: Fausto q6h, NPO at MN for heart biopsy tomorrow, update team with changes, encourage activity, monitor urine output, continue with plan of care.

## 2019-03-04 NOTE — PROVIDER NOTIFICATION
Current running heparin drip.  Per invasive cardiologist drip should be off 6 hours. Primary team notified, will reschedule HBx for this afternoon.

## 2019-03-04 NOTE — PROGRESS NOTES
CVTS PROGRESS NOTE  March 4, 2019      CO-MORBIDITIES:   Chronic systolic heart failure (H)  (primary encounter diagnosis)  Acute on chronic systolic congestive heart failure (H)    ASSESSMENT: Everton Larios is a 53 year old male with PMH etoh abuse, hypothyroidism, intermittent asthma, ulcerative colitis, Depression, Chronic HFrEF, NICM admitted with cardiogenic shock requiring subclavian balloon pump now s/p OHT 2/24/19 with Piyush House and Christopher.     TODAY'S PROGRESS:   -Hold diuresis today per cardiology, at attempted increasing filling pressures  -Right heart biopsy today, n.p.o., hold heparin drip  -Cardiology to assess bleeding around right IJ line in Cath Lab, may need a stitch for ongoing slow bleeding at the site.    PLAN:  Neuro/ pain/ sedation:  - Monitor neurological status. Notify the MD for any acute changes in exam.  - Tylenol, oxycodone and PRN fentanyl     #Depression  PTA cymbalta 20mg  Melatonin for sleep     Pulmonary care:   - 2l NC  - Supp oxygen to maintain SpO2 >92%  - Encourage IS and deep breathing    #Intermittent asthma  - PTA Montelukast ordered    Cardiovascular:  HR goal >95. Persistent mild hypotension   - Monitor hemodynamic status.   - MAP >65. HR goal > 95.  - Dobutamine 5 mcg/kg/min for RV support  - Increased sildenafil 20 q8h 3/3/2019   - Hydralazine discontinued for borderline hypotension  - continue terbutaline 10 mg q8h  - Holding ASA for bleeding around lines.   - Continue statin.     GI care:   #constipation  - Regular diet  - Senna, miralax and suppository for bowel regimen; MoM and miralax prn     #Intraperitoneal free air (CT obtained 2/26)  - Small peritoneal defect made during redo operation, closed with stitches intra-op- likely source of free air. No peritoneal signs, pain improved.     #Pierce's esophagus  #Ulcerative colitis  -PTA Mesalamine     Fluids/ Electrolytes/ Nutrition:   - TKO for IV fluid hydration  - No indication for parenteral  nutrition.    Renal/ Fluid Balance:   Slow increase in creatinine, likely secondary to significant diuresis on Lasix and Bumex drips recently, holding diuresis at this time, will continue to trend, adequate urine output at present.  - Will continue to monitor intake and output.  - Hold diuresis today in attempt to increase filling pressures  - PTA Tamsulosin      Endocrine:    # Stress hyperglycemia  - sliding scale insulin as needed  # hypothyroidism  - Home levothyroxine      ID/ Antibiotics:  - Perioperative antibiotics.   - Vancomycin and zosyn x 3 days- completed.  - Valcyte for CMV prophylaxis; holding bactrim for PCP prophylaxis.  - Nystatin for thrush     Heme:     #Bypass of persistent left SVC to R atrial appendage with [place]-Davis graft. Will require lifelong anti-coagulation for graft  - Hgb goal >7. Daily CBC.  - Low intensity heparin on hold for right heart cath    Prophylaxis:    - Mechanical prophylaxis for DVT  - Heparin as above  - Protonix qd     Lines/ tubes/ drains:  - RIJ, [place], Arterial line, med/pleural chest tubes     Disposition:  - CV ICU.     Patient seen, findings and plan discussed with CVTS Fellow    Nicolas Paredes MD  CA-3/PGY-4 Anesthesiology  [phone]  ====================================    SUBJECTIVE:   No acute events overnight, patient feels well this morning, cramps decreased after switching from Bumex to Lasix drip    OBJECTIVE:   1. VITAL SIGNS:   Temp:  [97.6  F (36.4  C)-98  F (36.7  C)] 98  F (36.7  C)  Pulse:  [92-97] 93  Heart Rate:  [92-97] 93  Resp:  [9-24] 16  BP: ()/(48-92) 105/56  SpO2:  [96 %-100 %] 98 %  Resp: 16      2. INTAKE/ OUTPUT:   I/O last 3 completed shifts:  In: 868.04 [P.O.:240; I.V.:628.04]  Out: 1250 [Urine:800; Chest Tube:450]    3. PHYSICAL EXAMINATION:     General: very pleasant, appropriately interactive   Neuro: Alert and oriented by 3, speech is fluent, face is symmetric  Resp: CTAB on room air  CV: Regular rate, regular rhythm,  serosanguinous output from chest tubes, trace pedal edema  Abdomen: Soft, Non-distended, non-tender  Incisions: c/d/i  Extremities: warm and well perfused    4. INVESTIGATIONS:   Arterial Blood Gases   Recent Labs   Lab 02/25/19  1717 02/25/19  1502   PH 7.32* 7.31*   PCO2 36 51*   PO2 110* 75*   HCO3 19* 26     Complete Blood Count   Recent Labs   Lab 03/04/19  0412 03/03/19  0329 03/02/19 1954 03/02/19  1721   WBC 18.9* 14.3* 12.8* 12.4*   HGB 8.5* 8.6* 8.7* 8.9*    201 199 205     Basic Metabolic Panel  Recent Labs   Lab 03/04/19  0412 03/03/19  1548 03/03/19  0329 03/02/19 1954   * 130* 132* 131*   POTASSIUM 4.2 4.2 4.0 4.1   CHLORIDE 96 97 98 97   CO2 18* 21 19* 20   BUN 79* 73* 70* 68*   CR 2.63* 2.34* 2.12* 2.12*   * 111* 114* 130*     Liver Function Tests  Recent Labs   Lab 03/02/19  0930 02/25/19  1502   AST 26 110*   ALT 36 28   ALKPHOS 87 89   BILITOTAL 0.9 1.4*   ALBUMIN 2.8* 3.4   INR  --  1.29*     Pancreatic Enzymes  No lab results found in last 7 days.  Coagulation Profile  Recent Labs   Lab 02/27/19  0421 02/25/19  1502   INR  --  1.29*   PTT 38* 32         5. RADIOLOGY:   No results found for this or any previous visit (from the past 24 hour(s)).    =========================================

## 2019-03-04 NOTE — PLAN OF CARE
Neuro intact, assist x2. Tylenol and Oxycodone administered x2 for generalized aching, however pt reports that the aching has started to improve. SR, no ectopy noted throughout shift. BP's soft, baseline, asymptomatic. JOSEPHINE's done q6hr, CO 5.3, CI 3.1, , PVR 90, SvO2 65%, [place] at 56. Pt on 2L NC overnight for comfort, dyspnea on exertion. Lungs clear/diminished. Frequent, productive cough noted, independent use of [place] and IS use encouraged. Pt NPO since MN for heart biopsy scheduled today. No BM this shift. Pt c/o abdominal discomfort and fullness, however believes this has been improving. Pt oliguric, voided x3, around 100mL each void. No skin concerns. Dobutamine gtt at 5/hr, Lasix gtt 5/hr, Heparin gtt 1100 units/hr. LPIV saline locked and patent. No changes overnight.     Continue to monitor, notify MD of any acute changes in status.

## 2019-03-04 NOTE — PROGRESS NOTES
CV ICU PROGRESS NOTE  March 4, 2019      CO-MORBIDITIES:   Chronic systolic heart failure (H)  (primary encounter diagnosis)  Acute on chronic systolic congestive heart failure (H)    ASSESSMENT: Everton Larios is a 53 year old male with PMH etoh abuse, hypothyroidism, intermittent asthma, ulcerative colitis, Depression, Chronic HFrEF, NICM admitted with cardiogenic shock requiring subclavian balloon pump now s/p OHT 2/24/19 with Piyush House and Christopher.     TODAY'S PROGRESS:   -Hold diuresis today per cardiology, at attempted increasing filling pressures  -Right heart biopsy today, n.p.o., hold heparin drip  -Cardiology to assess bleeding around right IJ line in Cath Lab, may need a stitch for ongoing slow bleeding at the site.    PLAN:  Neuro/ pain/ sedation:  - Monitor neurological status. Notify the MD for any acute changes in exam.  - Tylenol, oxycodone and PRN fentanyl     #Depression  PTA cymbalta 20mg  Melatonin for sleep     Pulmonary care:   - 2l NC  - Supp oxygen to maintain SpO2 >92%  - Encourage IS and deep breathing    #Intermittent asthma  - PTA Montelukast ordered    Cardiovascular:  HR goal >95. Persistent mild hypotension   - Monitor hemodynamic status.   - MAP >65. HR goal > 95.  - Dobutamine 5 mcg/kg/min for RV support  - Increased sildenafil 20 q8h 3/3/2019   - Hydralazine discontinued for borderline hypotension  - continue terbutaline 10 mg q8h  - Holding ASA for bleeding around lines.   - Continue statin.     GI care:   #constipation  - Regular diet  - Senna, miralax and suppository for bowel regimen; MoM and miralax prn     #Intraperitoneal free air (CT obtained 2/26)  - Small peritoneal defect made during redo operation, closed with stitches intra-op- likely source of free air. No peritoneal signs, pain improved.     #Pierce's esophagus  #Ulcerative colitis  -PTA Mesalamine     Fluids/ Electrolytes/ Nutrition:   - TKO for IV fluid hydration  - No indication for parenteral  nutrition.    Renal/ Fluid Balance:   Slow increase in creatinine, likely secondary to significant diuresis on Lasix and Bumex drips recently, holding diuresis at this time, will continue to trend, adequate urine output at present.  - Will continue to monitor intake and output.  - Hold diuresis today in attempt to increase filling pressures  - PTA Tamsulosin      Endocrine:    # Stress hyperglycemia  - sliding scale insulin as needed  # hypothyroidism  - Home levothyroxine      ID/ Antibiotics:  - Perioperative antibiotics.   - Vancomycin and zosyn x 3 days- completed.  - Valcyte for CMV prophylaxis; holding bactrim for PCP prophylaxis.  - Nystatin for thrush     Heme:     #Bypass of persistent left SVC to R atrial appendage with [place]-Davis graft. Will require lifelong anti-coagulation for graft  - Hgb goal >7. Daily CBC.  - Low intensity heparin on hold for right heart cath    Prophylaxis:    - Mechanical prophylaxis for DVT  - Heparin as above  - Protonix qd     Lines/ tubes/ drains:  - RIJ, [place], Arterial line, med/pleural chest tubes     Disposition:  - CV ICU.     Patient seen, findings and plan discussed with CV ICU staff, Dr. Henderson.    Nicolas Paredes MD  CA-3/PGY-4 Anesthesiology  [phone]  ====================================    SUBJECTIVE:   No acute events overnight, patient feels well this morning, cramps decreased after switching from Bumex to Lasix drip    OBJECTIVE:   1. VITAL SIGNS:   Temp:  [97.6  F (36.4  C)-98  F (36.7  C)] 98  F (36.7  C)  Pulse:  [92-97] 93  Heart Rate:  [92-97] 93  Resp:  [9-24] 16  BP: ()/(48-92) 105/56  SpO2:  [96 %-100 %] 98 %  Resp: 16      2. INTAKE/ OUTPUT:   I/O last 3 completed shifts:  In: 868.04 [P.O.:240; I.V.:628.04]  Out: 1250 [Urine:800; Chest Tube:450]    3. PHYSICAL EXAMINATION:     General: very pleasant, appropriately interactive   Neuro: Alert and oriented by 3, speech is fluent, face is symmetric  Resp: CTAB on room air  CV: Regular rate, regular  rhythm, serosanguinous output from chest tubes, trace pedal edema  Abdomen: Soft, Non-distended, non-tender  Incisions: c/d/i  Extremities: warm and well perfused    4. INVESTIGATIONS:   Arterial Blood Gases   Recent Labs   Lab 02/25/19  1717 02/25/19  1502   PH 7.32* 7.31*   PCO2 36 51*   PO2 110* 75*   HCO3 19* 26     Complete Blood Count   Recent Labs   Lab 03/04/19  0412 03/03/19  0329 03/02/19 1954 03/02/19  1721   WBC 18.9* 14.3* 12.8* 12.4*   HGB 8.5* 8.6* 8.7* 8.9*    201 199 205     Basic Metabolic Panel  Recent Labs   Lab 03/04/19  0412 03/03/19  1548 03/03/19  0329 03/02/19 1954   * 130* 132* 131*   POTASSIUM 4.2 4.2 4.0 4.1   CHLORIDE 96 97 98 97   CO2 18* 21 19* 20   BUN 79* 73* 70* 68*   CR 2.63* 2.34* 2.12* 2.12*   * 111* 114* 130*     Liver Function Tests  Recent Labs   Lab 03/02/19  0930 02/25/19  1502   AST 26 110*   ALT 36 28   ALKPHOS 87 89   BILITOTAL 0.9 1.4*   ALBUMIN 2.8* 3.4   INR  --  1.29*     Pancreatic Enzymes  No lab results found in last 7 days.  Coagulation Profile  Recent Labs   Lab 02/27/19  0421 02/25/19  1502   INR  --  1.29*   PTT 38* 32         5. RADIOLOGY:   No results found for this or any previous visit (from the past 24 hour(s)).    =========================================

## 2019-03-05 ENCOUNTER — APPOINTMENT (OUTPATIENT)
Dept: CARDIOLOGY | Facility: CLINIC | Age: 54
DRG: 001 | End: 2019-03-05
Attending: INTERNAL MEDICINE
Payer: COMMERCIAL

## 2019-03-05 ENCOUNTER — APPOINTMENT (OUTPATIENT)
Dept: GENERAL RADIOLOGY | Facility: CLINIC | Age: 54
DRG: 001 | End: 2019-03-05
Attending: PHYSICIAN ASSISTANT
Payer: COMMERCIAL

## 2019-03-05 ENCOUNTER — APPOINTMENT (OUTPATIENT)
Dept: GENERAL RADIOLOGY | Facility: CLINIC | Age: 54
DRG: 001 | End: 2019-03-05
Attending: INTERNAL MEDICINE
Payer: COMMERCIAL

## 2019-03-05 LAB
ALBUMIN SERPL-MCNC: 2.9 G/DL (ref 3.4–5)
ALP SERPL-CCNC: 102 U/L (ref 40–150)
ALT SERPL W P-5'-P-CCNC: 23 U/L (ref 0–70)
ANION GAP SERPL CALCULATED.3IONS-SCNC: 14 MMOL/L (ref 3–14)
ANION GAP SERPL CALCULATED.3IONS-SCNC: 14 MMOL/L (ref 3–14)
AST SERPL W P-5'-P-CCNC: 16 U/L (ref 0–45)
BASE DEFICIT BLDV-SCNC: 6 MMOL/L
BASE DEFICIT BLDV-SCNC: 6.1 MMOL/L
BASE DEFICIT BLDV-SCNC: 6.2 MMOL/L
BASE DEFICIT BLDV-SCNC: 6.3 MMOL/L
BASOPHILS # BLD AUTO: 0 10E9/L (ref 0–0.2)
BASOPHILS NFR BLD AUTO: 0.1 %
BILIRUB DIRECT SERPL-MCNC: 0.6 MG/DL (ref 0–0.2)
BILIRUB SERPL-MCNC: 1 MG/DL (ref 0.2–1.3)
BUN SERPL-MCNC: 86 MG/DL (ref 7–30)
BUN SERPL-MCNC: 91 MG/DL (ref 7–30)
CALCIUM SERPL-MCNC: 7.3 MG/DL (ref 8.5–10.1)
CALCIUM SERPL-MCNC: 7.9 MG/DL (ref 8.5–10.1)
CHLORIDE SERPL-SCNC: 94 MMOL/L (ref 94–109)
CHLORIDE SERPL-SCNC: 97 MMOL/L (ref 94–109)
CO2 SERPL-SCNC: 19 MMOL/L (ref 20–32)
CO2 SERPL-SCNC: 19 MMOL/L (ref 20–32)
CREAT SERPL-MCNC: 2.82 MG/DL (ref 0.66–1.25)
CREAT SERPL-MCNC: 2.83 MG/DL (ref 0.66–1.25)
DIFFERENTIAL METHOD BLD: ABNORMAL
EOSINOPHIL # BLD AUTO: 0 10E9/L (ref 0–0.7)
EOSINOPHIL NFR BLD AUTO: 0 %
ERYTHROCYTE [DISTWIDTH] IN BLOOD BY AUTOMATED COUNT: 14.7 % (ref 10–15)
ERYTHROCYTE [DISTWIDTH] IN BLOOD BY AUTOMATED COUNT: 14.8 % (ref 10–15)
GFR SERPL CREATININE-BSD FRML MDRD: 24 ML/MIN/{1.73_M2}
GFR SERPL CREATININE-BSD FRML MDRD: 24 ML/MIN/{1.73_M2}
GLUCOSE BLDC GLUCOMTR-MCNC: 101 MG/DL (ref 70–99)
GLUCOSE BLDC GLUCOMTR-MCNC: 106 MG/DL (ref 70–99)
GLUCOSE BLDC GLUCOMTR-MCNC: 122 MG/DL (ref 70–99)
GLUCOSE BLDC GLUCOMTR-MCNC: 194 MG/DL (ref 70–99)
GLUCOSE SERPL-MCNC: 107 MG/DL (ref 70–99)
GLUCOSE SERPL-MCNC: 89 MG/DL (ref 70–99)
HCO3 BLDV-SCNC: 19 MMOL/L (ref 21–28)
HCO3 BLDV-SCNC: 20 MMOL/L (ref 21–28)
HCT VFR BLD AUTO: 25.4 % (ref 40–53)
HCT VFR BLD AUTO: 25.4 % (ref 40–53)
HGB BLD-MCNC: 8.2 G/DL (ref 13.3–17.7)
HGB BLD-MCNC: 8.3 G/DL (ref 13.3–17.7)
IMM GRANULOCYTES # BLD: 0.9 10E9/L (ref 0–0.4)
IMM GRANULOCYTES NFR BLD: 4.6 %
LMWH PPP CHRO-ACNC: 0.16 IU/ML
LMWH PPP CHRO-ACNC: 0.2 IU/ML
LMWH PPP CHRO-ACNC: <0.1 IU/ML
LYMPHOCYTES # BLD AUTO: 0.7 10E9/L (ref 0.8–5.3)
LYMPHOCYTES NFR BLD AUTO: 3.5 %
MAGNESIUM SERPL-MCNC: 2.8 MG/DL (ref 1.6–2.3)
MAGNESIUM SERPL-MCNC: 2.9 MG/DL (ref 1.6–2.3)
MCH RBC QN AUTO: 29.8 PG (ref 26.5–33)
MCH RBC QN AUTO: 30 PG (ref 26.5–33)
MCHC RBC AUTO-ENTMCNC: 32.3 G/DL (ref 31.5–36.5)
MCHC RBC AUTO-ENTMCNC: 32.7 G/DL (ref 31.5–36.5)
MCV RBC AUTO: 92 FL (ref 78–100)
MCV RBC AUTO: 92 FL (ref 78–100)
MONOCYTES # BLD AUTO: 0.6 10E9/L (ref 0–1.3)
MONOCYTES NFR BLD AUTO: 3.1 %
NEUTROPHILS # BLD AUTO: 17.6 10E9/L (ref 1.6–8.3)
NEUTROPHILS NFR BLD AUTO: 88.7 %
O2/TOTAL GAS SETTING VFR VENT: 21 %
O2/TOTAL GAS SETTING VFR VENT: 21 %
O2/TOTAL GAS SETTING VFR VENT: ABNORMAL %
O2/TOTAL GAS SETTING VFR VENT: ABNORMAL %
OXYHGB MFR BLDV: 58 %
OXYHGB MFR BLDV: 62 %
OXYHGB MFR BLDV: 64 %
OXYHGB MFR BLDV: 66 %
PCO2 BLDV: 36 MM HG (ref 40–50)
PCO2 BLDV: 38 MM HG (ref 40–50)
PCO2 BLDV: 39 MM HG (ref 40–50)
PCO2 BLDV: 40 MM HG (ref 40–50)
PH BLDV: 7.3 PH (ref 7.32–7.43)
PH BLDV: 7.31 PH (ref 7.32–7.43)
PH BLDV: 7.32 PH (ref 7.32–7.43)
PH BLDV: 7.33 PH (ref 7.32–7.43)
PHOSPHATE SERPL-MCNC: 7.1 MG/DL (ref 2.5–4.5)
PLATELET # BLD AUTO: 203 10E9/L (ref 150–450)
PLATELET # BLD AUTO: 217 10E9/L (ref 150–450)
PO2 BLDV: 35 MM HG (ref 25–47)
PO2 BLDV: 37 MM HG (ref 25–47)
PO2 BLDV: 38 MM HG (ref 25–47)
PO2 BLDV: 40 MM HG (ref 25–47)
POTASSIUM SERPL-SCNC: 4 MMOL/L (ref 3.4–5.3)
POTASSIUM SERPL-SCNC: 4.2 MMOL/L (ref 3.4–5.3)
PROT SERPL-MCNC: 5.6 G/DL (ref 6.8–8.8)
RADIOLOGIST FLAGS: ABNORMAL
RBC # BLD AUTO: 2.75 10E12/L (ref 4.4–5.9)
RBC # BLD AUTO: 2.77 10E12/L (ref 4.4–5.9)
SODIUM SERPL-SCNC: 127 MMOL/L (ref 133–144)
SODIUM SERPL-SCNC: 130 MMOL/L (ref 133–144)
TACROLIMUS BLD-MCNC: 11.7 UG/L (ref 5–15)
TME LAST DOSE: NORMAL H
WBC # BLD AUTO: 19.7 10E9/L (ref 4–11)
WBC # BLD AUTO: 21 10E9/L (ref 4–11)

## 2019-03-05 PROCEDURE — 25000132 ZZH RX MED GY IP 250 OP 250 PS 637: Performed by: NURSE PRACTITIONER

## 2019-03-05 PROCEDURE — P9041 ALBUMIN (HUMAN),5%, 50ML: HCPCS | Performed by: ANESTHESIOLOGY

## 2019-03-05 PROCEDURE — 99291 CRITICAL CARE FIRST HOUR: CPT | Mod: 25 | Performed by: INTERNAL MEDICINE

## 2019-03-05 PROCEDURE — 25000128 H RX IP 250 OP 636: Performed by: ANESTHESIOLOGY

## 2019-03-05 PROCEDURE — P9041 ALBUMIN (HUMAN),5%, 50ML: HCPCS | Performed by: INTERNAL MEDICINE

## 2019-03-05 PROCEDURE — 25000132 ZZH RX MED GY IP 250 OP 250 PS 637: Performed by: SURGERY

## 2019-03-05 PROCEDURE — 25000132 ZZH RX MED GY IP 250 OP 250 PS 637: Performed by: STUDENT IN AN ORGANIZED HEALTH CARE EDUCATION/TRAINING PROGRAM

## 2019-03-05 PROCEDURE — 80076 HEPATIC FUNCTION PANEL: CPT | Performed by: PHYSICIAN ASSISTANT

## 2019-03-05 PROCEDURE — 40000048 ZZH STATISTIC DAILY SWAN MONITORING

## 2019-03-05 PROCEDURE — 93325 DOPPLER ECHO COLOR FLOW MAPG: CPT | Mod: 26 | Performed by: INTERNAL MEDICINE

## 2019-03-05 PROCEDURE — 25000132 ZZH RX MED GY IP 250 OP 250 PS 637: Performed by: INTERNAL MEDICINE

## 2019-03-05 PROCEDURE — 40000196 ZZH STATISTIC RAPCV CVP MONITORING

## 2019-03-05 PROCEDURE — 25000125 ZZHC RX 250: Performed by: INTERNAL MEDICINE

## 2019-03-05 PROCEDURE — 82805 BLOOD GASES W/O2 SATURATION: CPT | Performed by: PHYSICIAN ASSISTANT

## 2019-03-05 PROCEDURE — 25000128 H RX IP 250 OP 636: Performed by: INTERNAL MEDICINE

## 2019-03-05 PROCEDURE — 71045 X-RAY EXAM CHEST 1 VIEW: CPT

## 2019-03-05 PROCEDURE — 84100 ASSAY OF PHOSPHORUS: CPT | Performed by: PHYSICIAN ASSISTANT

## 2019-03-05 PROCEDURE — 82805 BLOOD GASES W/O2 SATURATION: CPT | Performed by: STUDENT IN AN ORGANIZED HEALTH CARE EDUCATION/TRAINING PROGRAM

## 2019-03-05 PROCEDURE — 85520 HEPARIN ASSAY: CPT | Performed by: INTERNAL MEDICINE

## 2019-03-05 PROCEDURE — 40000014 ZZH STATISTIC ARTERIAL MONITORING DAILY

## 2019-03-05 PROCEDURE — 25000132 ZZH RX MED GY IP 250 OP 250 PS 637: Performed by: ANESTHESIOLOGY

## 2019-03-05 PROCEDURE — 99233 SBSQ HOSP IP/OBS HIGH 50: CPT | Mod: GC | Performed by: ANESTHESIOLOGY

## 2019-03-05 PROCEDURE — 71045 X-RAY EXAM CHEST 1 VIEW: CPT | Mod: 77

## 2019-03-05 PROCEDURE — 93308 TTE F-UP OR LMTD: CPT

## 2019-03-05 PROCEDURE — 00000146 ZZHCL STATISTIC GLUCOSE BY METER IP

## 2019-03-05 PROCEDURE — 85520 HEPARIN ASSAY: CPT | Performed by: PHYSICIAN ASSISTANT

## 2019-03-05 PROCEDURE — 83735 ASSAY OF MAGNESIUM: CPT | Performed by: PHYSICIAN ASSISTANT

## 2019-03-05 PROCEDURE — 85520 HEPARIN ASSAY: CPT | Performed by: STUDENT IN AN ORGANIZED HEALTH CARE EDUCATION/TRAINING PROGRAM

## 2019-03-05 PROCEDURE — 40000986 XR CHEST PORT 1 VW

## 2019-03-05 PROCEDURE — 85027 COMPLETE CBC AUTOMATED: CPT | Performed by: PHYSICIAN ASSISTANT

## 2019-03-05 PROCEDURE — 93308 TTE F-UP OR LMTD: CPT | Mod: 26 | Performed by: INTERNAL MEDICINE

## 2019-03-05 PROCEDURE — 93321 DOPPLER ECHO F-UP/LMTD STD: CPT | Mod: 26 | Performed by: INTERNAL MEDICINE

## 2019-03-05 PROCEDURE — 80048 BASIC METABOLIC PNL TOTAL CA: CPT | Performed by: PHYSICIAN ASSISTANT

## 2019-03-05 PROCEDURE — 20000004 ZZH R&B ICU UMMC

## 2019-03-05 PROCEDURE — 85004 AUTOMATED DIFF WBC COUNT: CPT | Performed by: PHYSICIAN ASSISTANT

## 2019-03-05 PROCEDURE — 25000131 ZZH RX MED GY IP 250 OP 636 PS 637: Performed by: INTERNAL MEDICINE

## 2019-03-05 PROCEDURE — 80197 ASSAY OF TACROLIMUS: CPT | Performed by: INTERNAL MEDICINE

## 2019-03-05 RX ORDER — ALBUMIN, HUMAN INJ 5% 5 %
250 SOLUTION INTRAVENOUS ONCE
Status: COMPLETED | OUTPATIENT
Start: 2019-03-05 | End: 2019-03-05

## 2019-03-05 RX ORDER — VALGANCICLOVIR 450 MG/1
450 TABLET, FILM COATED ORAL
Status: DISCONTINUED | OUTPATIENT
Start: 2019-03-09 | End: 2019-03-08

## 2019-03-05 RX ORDER — ALBUMIN, HUMAN INJ 5% 5 %
12.5 SOLUTION INTRAVENOUS ONCE
Status: COMPLETED | OUTPATIENT
Start: 2019-03-05 | End: 2019-03-05

## 2019-03-05 RX ADMIN — SILDENAFIL 20 MG: 20 TABLET ORAL at 07:43

## 2019-03-05 RX ADMIN — TACROLIMUS 2 MG: 1 CAPSULE ORAL at 07:43

## 2019-03-05 RX ADMIN — TERBUTALINE SULFATE 10 MG: 5 TABLET ORAL at 16:06

## 2019-03-05 RX ADMIN — SILDENAFIL 20 MG: 20 TABLET ORAL at 14:29

## 2019-03-05 RX ADMIN — Medication 500000 UNITS: at 07:45

## 2019-03-05 RX ADMIN — Medication 500000 UNITS: at 16:06

## 2019-03-05 RX ADMIN — OXYCODONE HYDROCHLORIDE 10 MG: 5 TABLET ORAL at 16:33

## 2019-03-05 RX ADMIN — FLUTICASONE PROPIONATE 2 PUFF: 220 AEROSOL, METERED RESPIRATORY (INHALATION) at 07:45

## 2019-03-05 RX ADMIN — PREDNISONE 20 MG: 10 TABLET ORAL at 07:43

## 2019-03-05 RX ADMIN — PANTOPRAZOLE SODIUM 40 MG: 40 TABLET, DELAYED RELEASE ORAL at 07:43

## 2019-03-05 RX ADMIN — SENNOSIDES AND DOCUSATE SODIUM 2 TABLET: 8.6; 5 TABLET ORAL at 20:31

## 2019-03-05 RX ADMIN — MELATONIN TAB 3 MG 3 MG: 3 TAB at 20:31

## 2019-03-05 RX ADMIN — OXYCODONE HYDROCHLORIDE 10 MG: 5 TABLET ORAL at 00:58

## 2019-03-05 RX ADMIN — DULOXETINE 20 MG: 20 CAPSULE, DELAYED RELEASE ORAL at 07:48

## 2019-03-05 RX ADMIN — ROSUVASTATIN CALCIUM 10 MG: 10 TABLET, FILM COATED ORAL at 07:43

## 2019-03-05 RX ADMIN — LEVOTHYROXINE SODIUM 100 MCG: 100 TABLET ORAL at 07:43

## 2019-03-05 RX ADMIN — TERBUTALINE SULFATE 10 MG: 5 TABLET ORAL at 07:43

## 2019-03-05 RX ADMIN — MYCOPHENOLATE MOFETIL 1500 MG: 500 TABLET ORAL at 18:29

## 2019-03-05 RX ADMIN — ACETAMINOPHEN 650 MG: 325 TABLET, FILM COATED ORAL at 20:31

## 2019-03-05 RX ADMIN — TERBUTALINE SULFATE 10 MG: 5 TABLET ORAL at 00:57

## 2019-03-05 RX ADMIN — MONTELUKAST SODIUM 10 MG: 10 TABLET, COATED ORAL at 22:09

## 2019-03-05 RX ADMIN — ACETAMINOPHEN 650 MG: 325 TABLET, FILM COATED ORAL at 00:57

## 2019-03-05 RX ADMIN — POLYETHYLENE GLYCOL 3350 17 G: 17 POWDER, FOR SOLUTION ORAL at 07:43

## 2019-03-05 RX ADMIN — VALGANCICLOVIR 450 MG: 450 TABLET, FILM COATED ORAL at 08:47

## 2019-03-05 RX ADMIN — MESALAMINE 2400 MG: 800 TABLET, DELAYED RELEASE ORAL at 08:47

## 2019-03-05 RX ADMIN — ALBUMIN HUMAN 12.5 G: 0.05 INJECTION, SOLUTION INTRAVENOUS at 11:30

## 2019-03-05 RX ADMIN — OXYCODONE HYDROCHLORIDE 10 MG: 5 TABLET ORAL at 20:32

## 2019-03-05 RX ADMIN — PREDNISONE 20 MG: 10 TABLET ORAL at 17:39

## 2019-03-05 RX ADMIN — SENNOSIDES AND DOCUSATE SODIUM 2 TABLET: 8.6; 5 TABLET ORAL at 07:43

## 2019-03-05 RX ADMIN — MYCOPHENOLATE MOFETIL 1500 MG: 500 TABLET ORAL at 07:43

## 2019-03-05 RX ADMIN — OXYCODONE HYDROCHLORIDE 10 MG: 5 TABLET ORAL at 08:48

## 2019-03-05 RX ADMIN — TACROLIMUS 2 MG: 1 CAPSULE ORAL at 17:39

## 2019-03-05 RX ADMIN — LIDOCAINE 1 PATCH: 560 PATCH PERCUTANEOUS; TOPICAL; TRANSDERMAL at 07:43

## 2019-03-05 RX ADMIN — SILDENAFIL 20 MG: 20 TABLET ORAL at 20:32

## 2019-03-05 RX ADMIN — MESALAMINE 2400 MG: 800 TABLET, DELAYED RELEASE ORAL at 20:34

## 2019-03-05 RX ADMIN — FLUTICASONE PROPIONATE 2 PUFF: 220 AEROSOL, METERED RESPIRATORY (INHALATION) at 20:35

## 2019-03-05 RX ADMIN — ALBUMIN HUMAN 250 ML: 0.05 INJECTION, SOLUTION INTRAVENOUS at 17:35

## 2019-03-05 RX ADMIN — INSULIN ASPART 2 UNITS: 100 INJECTION, SOLUTION INTRAVENOUS; SUBCUTANEOUS at 07:57

## 2019-03-05 RX ADMIN — Medication 500000 UNITS: at 20:34

## 2019-03-05 RX ADMIN — Medication 500000 UNITS: at 12:50

## 2019-03-05 RX ADMIN — ACETAMINOPHEN 650 MG: 325 TABLET, FILM COATED ORAL at 16:33

## 2019-03-05 ASSESSMENT — ACTIVITIES OF DAILY LIVING (ADL)
ADLS_ACUITY_SCORE: 11

## 2019-03-05 ASSESSMENT — MIFFLIN-ST. JEOR: SCORE: 1430.5

## 2019-03-05 ASSESSMENT — PAIN DESCRIPTION - DESCRIPTORS
DESCRIPTORS: ACHING;CONSTANT

## 2019-03-05 NOTE — PROGRESS NOTES
Pt returned from cath lab at 1750. SWAN was placed in neck, RIJ still in place. Hemodynamics are relatively same from before; please see flowsheet. Vital signs stable, O2 saturations WDL on room air. Heparin gtt still on hold; called CARDS 2 to confirm--MD is ok with heparin gtt being off for now. Will continue to monitor.

## 2019-03-05 NOTE — PLAN OF CARE
D/I: Pt arrived from  at 1415. Hemodynamically stable. MD repositioned SWAN at 1600 to 55 cm. CXR done. JOSEPHINE CO=5.3 and CI = 3.1. TTE done today.1600 sodium = 130 and creatinine = 2.82. Albumin 5% 250cc IV given. Heparin gtt infusing at 950 units/hr. Heparin Xa at 1630 is therapeutic. Pleural CTs with minimal output. Pt OOB to chair x 2 hrs today. Pt given Oxycodone 10 mg for L neck pain and incisional pain. Fair relief of pain.  A:Pt stable with current cares.  P: Continue to monitor. See Epic flow sheets for VS and further assessments.

## 2019-03-05 NOTE — PROGRESS NOTES
CV ICU PROGRESS NOTE  March 5, 2019      CO-MORBIDITIES:   Chronic systolic heart failure (H)  (primary encounter diagnosis)  Acute on chronic systolic congestive heart failure (H)    ASSESSMENT: Everton Larios is a 53 year old male with PMH etoh abuse, hypothyroidism, intermittent asthma, ulcerative colitis, Depression, Chronic HFrEF, NICM admitted with cardiogenic shock requiring subclavian balloon pump now s/p OHT 2/24/19 with P[facility] House and Christopher.     TODAY'S PROGRESS:   - Fluid challenge today  - Decrease dobutamine to 2.5  - Remove RIJ sheath  - Repeat echo  - Continue to hold diuresis    PLAN:  Neuro/ pain/ sedation:  - Monitor neurological status. Notify the MD for any acute changes in exam.  - Tylenol, oxycodone and PRN fentanyl     #Depression  PTA cymbalta 20mg  Melatonin for sleep     Pulmonary care:   - 2l NC  - Supp oxygen to maintain SpO2 >92%  - Encourage IS and deep breathing    #Intermittent asthma  - PTA Montelukast ordered    Cardiovascular:  HR goal >95. Persistent mild hypotension, High CI, suspect this could be mix of hypovolemia and low SVR from sildenafil and dobutamine, trial of decreasing dobutamine 3/5/2019 with fluid challenge   - Monitor hemodynamic status.   - MAP >65. HR goal > 95.  - Dobutamine 2.5 mcg/kg/min for RV support  - Increased sildenafil 20 q8h 3/3/2019   - Hydralazine discontinued for borderline hypotension  - continue terbutaline 10 mg q8h  - Holding ASA for bleeding around lines.   - Continue statin.     GI care:   #constipation  - Regular diet  - Senna, miralax and suppository for bowel regimen; MoM and miralax prn     #Intraperitoneal free air (CT obtained 2/26)  - Small peritoneal defect made during redo operation, closed with stitches intra-op- likely source of free air. No peritoneal signs, pain improved.     #Pierce's esophagus  #Ulcerative colitis  -PTA Mesalamine     Fluids/ Electrolytes/ Nutrition:   - TKO for IV fluid hydration  - No indication for  parenteral nutrition.    Renal/ Fluid Balance:   Slow increase in creatinine, likely secondary to significant diuresis on Lasix and Bumex drips recently, holding diuresis at this time, will continue to trend, adequate urine output at present.  - Will continue to monitor intake and output.  - Hold diuresis today in attempt to increase filling pressures  - PTA Tamsulosin      Endocrine:    # Stress hyperglycemia  - sliding scale insulin as needed  # hypothyroidism  - Home levothyroxine      ID/ Antibiotics:  - Perioperative antibiotics.   - Vancomycin and zosyn x 3 days- completed.  - Valcyte for CMV prophylaxis; holding bactrim for PCP prophylaxis.  - Nystatin for thrush     Heme:     #Bypass of persistent left SVC to R atrial appendage with [place] graft. Will require lifelong anti-coagulation for graft  - Hgb goal >7. Daily CBC.  - Low intensity heparin on hold for right heart cath    Prophylaxis:    - Mechanical prophylaxis for DVT  - Heparin as above  - Protonix qd     Lines/ tubes/ drains:  - RIJ, [place], Arterial line, med/pleural chest tubes     Disposition:  - CV ICU.     Patient seen, findings and plan discussed with CV ICU staff, Dr. Henderson.    Nicolas Paredes MD  CA-3/PGY-4 Anesthesiology  [phone]  ====================================    SUBJECTIVE:   FRANTZ, RHC yesterday, CVP 10, new LIJ swan. Feels well overall, tired this morning    OBJECTIVE:   1. VITAL SIGNS:   Temp:  [97.5  F (36.4  C)-98.1  F (36.7  C)] 97.5  F (36.4  C)  Pulse:  [89-96] 89  Heart Rate:  [89-96] 89  Resp:  [16-25] 24  BP: ()/(51-67) 109/60  SpO2:  [96 %-100 %] 100 %  Resp: 24      2. INTAKE/ OUTPUT:   I/O last 3 completed shifts:  In: 745.03 [P.O.:395; I.V.:350.03]  Out: 915 [Urine:565; Chest Tube:350]    3. PHYSICAL EXAMINATION:     General: very pleasant, appropriately interactive   Neuro: Alert and oriented by 3, speech is fluent, face is symmetric  Resp: CTAB on room air  CV: Regular rate, regular rhythm,  serosanguinous output from chest tubes, trace pedal edema  Abdomen: Soft, Non-distended, non-tender  Incisions: c/d/i  Extremities: warm and well perfused    4. INVESTIGATIONS:   Arterial Blood Gases   No lab results found in last 7 days.  Complete Blood Count   Recent Labs   Lab 03/05/19  0346 03/04/19  1603 03/04/19  0412 03/03/19  0329   WBC 19.7* 17.7* 18.9* 14.3*   HGB 8.2* 8.4* 8.5* 8.6*    220 214 201     Basic Metabolic Panel  Recent Labs   Lab 03/05/19  0346 03/04/19  1603 03/04/19  0412 03/03/19  1548   * 129* 130* 130*   POTASSIUM 4.2 4.1 4.2 4.2   CHLORIDE 94 96 96 97   CO2 19* 18* 18* 21   BUN 91* 84* 79* 73*   CR 2.83* 2.31* 2.63* 2.34*   * 119* 108* 111*     Liver Function Tests  Recent Labs   Lab 03/05/19  0346 03/02/19  0930   AST 16 26   ALT 23 36   ALKPHOS 102 87   BILITOTAL 1.0 0.9   ALBUMIN 2.9* 2.8*     Pancreatic Enzymes  No lab results found in last 7 days.  Coagulation Profile  Recent Labs   Lab 02/27/19  0421   PTT 38*         5. RADIOLOGY:   Recent Results (from the past 24 hour(s))   XR Chest Port 1 View    Narrative    EXAM: XR CHEST PORT 1 VW  3/5/2019 1:31 AM      HISTORY: Lines and tubes.    COMPARISON: Radiograph 3/4/2018    FINDINGS: AP radiograph of the chest. Left IJ Roll[place]-Liz catheter tip  projects over the distal interlobar artery on the right. Right IJ  Rolla-Liz catheter sheath tip projects over the high SVC. Stable  mediastinal drains and chest tubes.    3 to 4 mm metallic foreign body projecting over the superior aspect of  the right hilum. Has been present since postoperative film of  2/25/2019 and likely represents part of the patient's a pacemaker that  was removed.    No pneumothorax. Stable left basilar opacity. No definite pleural  effusions. Mild diffuse interstitial markings.      Impression    IMPRESSION:   1. Left IJ Rolla-Liz catheter tip projects over the distal right  interlobar artery. Recommend retraction.  2. Stable left basilar opacity,  likely atelectasis.  3. Mild pulmonary edema.  4. Incidental note is made of retained portion of the the patient's  pacemaker lead after heart transplant.  .    Findings of [place]-Liz catheter placement were discussed with Dr. Jaeger  by telephone at 3/5/2019 1:35 AM by Dr. Sanjay Gutiérrez.    I have personally reviewed the examination and initial interpretation  and I agree with the findings.    ZAC MIRZA MD       =========================================

## 2019-03-05 NOTE — PLAN OF CARE
OT/CR 4E: Cancel.  Attempted to see pt multiple times today, initially pt declines, then getting CT pulled then in process of transferring rooms.

## 2019-03-05 NOTE — PLAN OF CARE
PT 4E: Cancel. Upon attempt, RN holding pressure on internal jugular line.  X-ray enters.  PT unable to check back.

## 2019-03-05 NOTE — PROGRESS NOTES
CLINICAL NUTRITION SERVICES - brief note. See 3/4 note for full reassessment.    FEN/GI: Diet advanced to cardiac diet post-procedure yesterday (previously was on regular diet). Checked in w/ pt re: PO intake. His appetite is not great as he feels some abdominal fullness, but he says this is improving. Per [employer], he is ordering ~2 small meals per day (meals consist of ~2 items each such as cereal, jello, ice cream, roast turkey).     Interventions  Education - Encouraged intake of small/frequent meals and snacks, high protein sources. Pt feels as though intake will continue to improve.   Medical food supplement therapy - Gelatein plus @ 10 am and HS. Pt does not like Boost Plus.   Adjusted diet back to regular diet given fair PO intakes and acute post-transplant status.     RD will continue to follow.    Amber Lemon RD, LD, [facility]  CVICU Dietitian  Pager: [phone]

## 2019-03-05 NOTE — PROGRESS NOTES
ADVANCED HEART FAILURE PROGRESS NOTE    SUBJECTIVE:  No acute overnight events. Went for first heart biopsy yesterday with replacement of swan line on the left internal jugular.     ROS otherwise negative.    OBJECTIVE:  Vital signs:  Temp: 98  F (36.7  C) Temp  Min: 97.8  F (36.6  C)  Max: 98.1  F (36.7  C)  Heart Rate: 95 Heart Rate  Min: 92  Max: 96  BP: 95/53 Systolic (24hrs), Av , Min:88 , Max:110   Diastolic (24hrs), Av, Min:52, Max:92    Resp: 16 Resp  Min: 15  Max: 18  SpO2: 96 % SpO2  Min: 96 %  Max: 100 %      [place]: RA 15, PA 37/20, PA sat 66%, CI 3.9,       Intake/Output Summary (Last 24 hours) at 3/5/2019 0651  Last data filed at 3/5/2019 0500  Gross per 24 hour   Intake 739.03 ml   Output 815 ml   Net -75.97 ml       Vitals:    19 0400 19 0400 19 0400   Weight: 61.6 kg (135 lb 12.9 oz) 61.7 kg (136 lb 0.4 oz) 62.1 kg (136 lb 14.5 oz)       Physical Exam:  GEN:  Alert, interactive, appears comfortable, NAD.  HEENT:  [place] R internal jugular with saturated bandage  CV:  Regular rate and rhythm, no murmur or JVD.   CHEST: Subclavian incision sutured.  Chest tubes.  LUNGS:  On room air, normal work of breathing, Clear to auscultation bilaterally, no wheezes or crackles.   ABD:  Active bowel sounds, soft, non-tender/non-distended.  No rebound/guarding/rigidity.  EXT:  No edema or cyanosis.  Hands/feet warm to touch with good signs of peripheral perfusion.   NEURO:  Alert and oriented, no new focal deficits appreciated.  PSCYH: Normal mood and affect      Medications:    DOBUTamine 5 mcg/kg/min (19 0500)     HEParin 950 Units/hr (19 0647)     - MEDICATION INSTRUCTIONS -       - MEDICATION INSTRUCTIONS -       BETA BLOCKER NOT PRESCRIBED         Current Facility-Administered Medications   Medication Dose Route Frequency     DULoxetine  20 mg Oral Daily     fluticasone  2 puff Inhalation Q12H     heparin lock flush  5 mL Intravenous Q24H     insulin aspart  1-7 Units  Subcutaneous TID AC     insulin aspart  1-5 Units Subcutaneous At Bedtime     levothyroxine  100 mcg Oral Daily     lidocaine  1 patch Transdermal Q24H     lidocaine   Transdermal Q8H     lidocaine   Transdermal Q24h     melatonin  3 mg Oral At Bedtime     mesalamine  2,400 mg Oral BID     montelukast  10 mg Oral At Bedtime     mycophenolate  1,500 mg Oral BID IS     nystatin  500,000 Units Oral 4x Daily     pantoprazole  40 mg Oral QAM AC     polyethylene glycol  17 g Oral Daily     predniSONE  20 mg Oral BID w/meals     rosuvastatin  10 mg Oral Daily     senna-docusate  1 tablet Oral BID    Or     senna-docusate  2 tablet Oral BID     sildenafil  20 mg Oral TID     sodium chloride (PF)  3 mL Intracatheter Q8H     tacrolimus  2 mg Oral BID IS     terbutaline  10 mg Oral Q8H     valGANciclovir  450 mg Oral Every Other Day         Labs:  CMP  Recent Labs   Lab 03/05/19  0346 03/04/19  1603 03/04/19  0412  03/02/19  0930   * 129* 130*   < > 128*   POTASSIUM 4.2 4.1 4.2   < > 4.2   CHLORIDE 94 96 96   < > 94   CO2 19* 18* 18*   < > 21   BUN 91* 84* 79*   < > 69*   CR 2.83* 2.31* 2.63*   < > 2.09*   RADAMES 7.9* 7.7* 7.8*   < > 7.8*   MAG 2.9* 2.7* 2.7*   < >  --    PHOS 7.1*  --  6.7*   < >  --    * 119* 108*   < > 160*   ALKPHOS  --   --   --   --  87   BILITOTAL  --   --   --   --  0.9   AST  --   --   --   --  26   ALT  --   --   --   --  36    < > = values in this interval not displayed.     BLOOD GASNo lab results found in last 7 days.  CBC  Recent Labs   Lab 03/05/19  0346 03/04/19  1603   WBC 19.7* 17.7*   HGB 8.2* 8.4*   HCT 25.4* 25.8*    220     COAG  Recent Labs   Lab 02/27/19  0421   PTT 38*         ASSESSMENT/PLAN:  Everton Larios is a 52 yo gentleman who underwent orthotopic heart transplant 2/24/19 for non ischemic cardiomyopathy and cardiogenic shock. Post-op complicated by RV dysfunction and [place].     Updates today:  Creatinine continues to rise with reduced urine output. CVP was 10 in cath  lab yesterday evening. LFTs and lactic acid normal. Suspect patient may be relatively volume depleted. Will hold diuretic and trial small fluid challenge with albumin. Repeat echo today again shows normal LV function with mildly dilated and hypokinetic RV. WBC also elevated but no fevers and no focal signs of infection. Cultures drawn and pending. Holding on antibiotics for now.         #S/p OHT on 2/24:  Graft function:  - echo 3/1 shows normal LV function, RV is mild to moderately dilated and hypokinetic  - repeat echo today  - dobutamine at 5 for continue RV and hemodynamic support  - continue sildenafil 20 q8h  - holding diuretic for now, trial bolus for albumin  - off isuprel, continue terbutaline 10 q8h     Immunosuppression:  - on MMF 1500 BID  - s/p methylpred 125 x 3 doses, continue prednisone 20mg BID, taper by 5 daily with negative biopsies  - NOT Simulect protocol, on tacro 2 BID, level pending     Prophylaxis:  - CMV +/+: medium risk, start renal dose Valcyte  - PCP: holding Bactrim for now, pentamadine given 3/3  - Thrush: start nystatin  - GI: on PPI  - CAV: start Crestor, holding ASA given bleeding at line sites     Biopsies:  - First biopsy Monday 3/4, result pending     Goretex conduit for persistent left SVC:  - will need anticoagulation, on low intensity heparin        Seen and staffed with Dr. Shilo Harris MD  Advanced Heart Failure Fellow  [phone]      I have reviewed today's vital signs, notes, medications, labs and imaging.  I have also seen and examined the patient and agree with the findings and plan as outlined above. Pt with RHC placed yesterday and OHT on 2-24-19. Afebrile, HR 95, RR 16 and BP 98/58 with CVP 15, CO 6.6 and .  UO 1205.  Lungs clear and tachy S1 and S2 with no gallop.  Labs with Cr 2.8 and WBC 19.7.  Assessment: Pt with OHT and nl LV fn with mod RV dysfn on Dbx and sildenafil.  Receiving triple drug IS including tac 2 mg bid.  Agree with holding  diuretics, administering iv fluids and decrease Dbx to 2 mcg/kg/min.  Total critical care time 45 min.  Pt seen X3 today.     Stevie Lao MD, PhD  Professor, Heart Failure and Cardiac Transplantation  [facility]

## 2019-03-06 ENCOUNTER — APPOINTMENT (OUTPATIENT)
Dept: PHYSICAL THERAPY | Facility: CLINIC | Age: 54
DRG: 001 | End: 2019-03-06
Attending: INTERNAL MEDICINE
Payer: COMMERCIAL

## 2019-03-06 ENCOUNTER — APPOINTMENT (OUTPATIENT)
Dept: OCCUPATIONAL THERAPY | Facility: CLINIC | Age: 54
DRG: 001 | End: 2019-03-06
Attending: INTERNAL MEDICINE
Payer: COMMERCIAL

## 2019-03-06 ENCOUNTER — APPOINTMENT (OUTPATIENT)
Dept: GENERAL RADIOLOGY | Facility: CLINIC | Age: 54
DRG: 001 | End: 2019-03-06
Attending: PHYSICIAN ASSISTANT
Payer: COMMERCIAL

## 2019-03-06 LAB
ALBUMIN SERPL-MCNC: 3.2 G/DL (ref 3.4–5)
ALP SERPL-CCNC: 100 U/L (ref 40–150)
ALT SERPL W P-5'-P-CCNC: 21 U/L (ref 0–70)
ANION GAP SERPL CALCULATED.3IONS-SCNC: 15 MMOL/L (ref 3–14)
ANION GAP SERPL CALCULATED.3IONS-SCNC: 16 MMOL/L (ref 3–14)
ANION GAP SERPL CALCULATED.3IONS-SCNC: 17 MMOL/L (ref 3–14)
ANION GAP SERPL CALCULATED.3IONS-SCNC: 17 MMOL/L (ref 3–14)
AST SERPL W P-5'-P-CCNC: 11 U/L (ref 0–45)
BACTERIA SPEC CULT: NO GROWTH
BASE DEFICIT BLDV-SCNC: 10.7 MMOL/L
BASE DEFICIT BLDV-SCNC: 10.9 MMOL/L
BASE DEFICIT BLDV-SCNC: 6.2 MMOL/L
BASE DEFICIT BLDV-SCNC: 8 MMOL/L
BILIRUB SERPL-MCNC: 0.8 MG/DL (ref 0.2–1.3)
BUN SERPL-MCNC: 90 MG/DL (ref 7–30)
BUN SERPL-MCNC: 93 MG/DL (ref 7–30)
BUN SERPL-MCNC: 97 MG/DL (ref 7–30)
BUN SERPL-MCNC: 99 MG/DL (ref 7–30)
CALCIUM SERPL-MCNC: 7.2 MG/DL (ref 8.5–10.1)
CALCIUM SERPL-MCNC: 7.5 MG/DL (ref 8.5–10.1)
CALCIUM SERPL-MCNC: 7.6 MG/DL (ref 8.5–10.1)
CALCIUM SERPL-MCNC: 7.9 MG/DL (ref 8.5–10.1)
CHLORIDE SERPL-SCNC: 93 MMOL/L (ref 94–109)
CHLORIDE SERPL-SCNC: 93 MMOL/L (ref 94–109)
CHLORIDE SERPL-SCNC: 94 MMOL/L (ref 94–109)
CHLORIDE SERPL-SCNC: 94 MMOL/L (ref 94–109)
CO2 SERPL-SCNC: 16 MMOL/L (ref 20–32)
CO2 SERPL-SCNC: 17 MMOL/L (ref 20–32)
CO2 SERPL-SCNC: 17 MMOL/L (ref 20–32)
CO2 SERPL-SCNC: 18 MMOL/L (ref 20–32)
COPATH REPORT: NORMAL
CREAT SERPL-MCNC: 3.06 MG/DL (ref 0.66–1.25)
CREAT SERPL-MCNC: 3.25 MG/DL (ref 0.66–1.25)
CREAT SERPL-MCNC: 3.27 MG/DL (ref 0.66–1.25)
CREAT SERPL-MCNC: 3.48 MG/DL (ref 0.66–1.25)
ERYTHROCYTE [DISTWIDTH] IN BLOOD BY AUTOMATED COUNT: 14.8 % (ref 10–15)
ERYTHROCYTE [DISTWIDTH] IN BLOOD BY AUTOMATED COUNT: 14.8 % (ref 10–15)
GFR SERPL CREATININE-BSD FRML MDRD: 19 ML/MIN/{1.73_M2}
GFR SERPL CREATININE-BSD FRML MDRD: 20 ML/MIN/{1.73_M2}
GFR SERPL CREATININE-BSD FRML MDRD: 20 ML/MIN/{1.73_M2}
GFR SERPL CREATININE-BSD FRML MDRD: 22 ML/MIN/{1.73_M2}
GLUCOSE BLDC GLUCOMTR-MCNC: 100 MG/DL (ref 70–99)
GLUCOSE BLDC GLUCOMTR-MCNC: 105 MG/DL (ref 70–99)
GLUCOSE BLDC GLUCOMTR-MCNC: 107 MG/DL (ref 70–99)
GLUCOSE BLDC GLUCOMTR-MCNC: 108 MG/DL (ref 70–99)
GLUCOSE BLDC GLUCOMTR-MCNC: 113 MG/DL (ref 70–99)
GLUCOSE SERPL-MCNC: 102 MG/DL (ref 70–99)
GLUCOSE SERPL-MCNC: 95 MG/DL (ref 70–99)
GLUCOSE SERPL-MCNC: 98 MG/DL (ref 70–99)
GLUCOSE SERPL-MCNC: 99 MG/DL (ref 70–99)
HCO3 BLDV-SCNC: 15 MMOL/L (ref 21–28)
HCO3 BLDV-SCNC: 15 MMOL/L (ref 21–28)
HCO3 BLDV-SCNC: 18 MMOL/L (ref 21–28)
HCO3 BLDV-SCNC: 19 MMOL/L (ref 21–28)
HCT VFR BLD AUTO: 24.8 % (ref 40–53)
HCT VFR BLD AUTO: 25.7 % (ref 40–53)
HGB BLD-MCNC: 7.9 G/DL (ref 13.3–17.7)
HGB BLD-MCNC: 8.3 G/DL (ref 13.3–17.7)
LACTATE BLD-SCNC: 0.6 MMOL/L (ref 0.7–2)
LMWH PPP CHRO-ACNC: 0.33 IU/ML
MAGNESIUM SERPL-MCNC: 2.7 MG/DL (ref 1.6–2.3)
MAGNESIUM SERPL-MCNC: 3.1 MG/DL (ref 1.6–2.3)
MCH RBC QN AUTO: 29.6 PG (ref 26.5–33)
MCH RBC QN AUTO: 29.9 PG (ref 26.5–33)
MCHC RBC AUTO-ENTMCNC: 31.9 G/DL (ref 31.5–36.5)
MCHC RBC AUTO-ENTMCNC: 32.3 G/DL (ref 31.5–36.5)
MCV RBC AUTO: 92 FL (ref 78–100)
MCV RBC AUTO: 93 FL (ref 78–100)
O2/TOTAL GAS SETTING VFR VENT: 21 %
OXYHGB MFR BLDV: 56 %
OXYHGB MFR BLDV: 57 %
OXYHGB MFR BLDV: 58 %
OXYHGB MFR BLDV: 60 %
PCO2 BLDV: 31 MM HG (ref 40–50)
PCO2 BLDV: 31 MM HG (ref 40–50)
PCO2 BLDV: 36 MM HG (ref 40–50)
PCO2 BLDV: 37 MM HG (ref 40–50)
PH BLDV: 7.29 PH (ref 7.32–7.43)
PH BLDV: 7.29 PH (ref 7.32–7.43)
PH BLDV: 7.3 PH (ref 7.32–7.43)
PH BLDV: 7.32 PH (ref 7.32–7.43)
PHOSPHATE SERPL-MCNC: 7.7 MG/DL (ref 2.5–4.5)
PLATELET # BLD AUTO: 204 10E9/L (ref 150–450)
PLATELET # BLD AUTO: 206 10E9/L (ref 150–450)
PO2 BLDV: 34 MM HG (ref 25–47)
PO2 BLDV: 35 MM HG (ref 25–47)
PO2 BLDV: 36 MM HG (ref 25–47)
PO2 BLDV: 37 MM HG (ref 25–47)
POTASSIUM SERPL-SCNC: 4.3 MMOL/L (ref 3.4–5.3)
POTASSIUM SERPL-SCNC: 4.6 MMOL/L (ref 3.4–5.3)
POTASSIUM SERPL-SCNC: 4.6 MMOL/L (ref 3.4–5.3)
POTASSIUM SERPL-SCNC: 4.7 MMOL/L (ref 3.4–5.3)
PROT SERPL-MCNC: 5.6 G/DL (ref 6.8–8.8)
RBC # BLD AUTO: 2.67 10E12/L (ref 4.4–5.9)
RBC # BLD AUTO: 2.78 10E12/L (ref 4.4–5.9)
SODIUM SERPL-SCNC: 126 MMOL/L (ref 133–144)
SODIUM SERPL-SCNC: 126 MMOL/L (ref 133–144)
SODIUM SERPL-SCNC: 127 MMOL/L (ref 133–144)
SODIUM SERPL-SCNC: 128 MMOL/L (ref 133–144)
SPECIMEN SOURCE: NORMAL
TACROLIMUS BLD-MCNC: 11.1 UG/L (ref 5–15)
TME LAST DOSE: NORMAL H
WBC # BLD AUTO: 13.8 10E9/L (ref 4–11)
WBC # BLD AUTO: 16 10E9/L (ref 4–11)

## 2019-03-06 PROCEDURE — 97116 GAIT TRAINING THERAPY: CPT | Mod: GP

## 2019-03-06 PROCEDURE — 25000128 H RX IP 250 OP 636: Performed by: INTERNAL MEDICINE

## 2019-03-06 PROCEDURE — P9041 ALBUMIN (HUMAN),5%, 50ML: HCPCS | Performed by: ANESTHESIOLOGY

## 2019-03-06 PROCEDURE — 82805 BLOOD GASES W/O2 SATURATION: CPT | Performed by: INTERNAL MEDICINE

## 2019-03-06 PROCEDURE — 25000131 ZZH RX MED GY IP 250 OP 636 PS 637: Performed by: INTERNAL MEDICINE

## 2019-03-06 PROCEDURE — 83735 ASSAY OF MAGNESIUM: CPT | Performed by: PHYSICIAN ASSISTANT

## 2019-03-06 PROCEDURE — 25000132 ZZH RX MED GY IP 250 OP 250 PS 637: Performed by: STUDENT IN AN ORGANIZED HEALTH CARE EDUCATION/TRAINING PROGRAM

## 2019-03-06 PROCEDURE — 85520 HEPARIN ASSAY: CPT | Performed by: PHYSICIAN ASSISTANT

## 2019-03-06 PROCEDURE — 80048 BASIC METABOLIC PNL TOTAL CA: CPT | Performed by: PHYSICIAN ASSISTANT

## 2019-03-06 PROCEDURE — 71045 X-RAY EXAM CHEST 1 VIEW: CPT

## 2019-03-06 PROCEDURE — 85027 COMPLETE CBC AUTOMATED: CPT | Performed by: PHYSICIAN ASSISTANT

## 2019-03-06 PROCEDURE — 80053 COMPREHEN METABOLIC PANEL: CPT | Performed by: PHYSICIAN ASSISTANT

## 2019-03-06 PROCEDURE — 25000132 ZZH RX MED GY IP 250 OP 250 PS 637: Performed by: INTERNAL MEDICINE

## 2019-03-06 PROCEDURE — 82805 BLOOD GASES W/O2 SATURATION: CPT | Performed by: PHYSICIAN ASSISTANT

## 2019-03-06 PROCEDURE — 25000132 ZZH RX MED GY IP 250 OP 250 PS 637: Performed by: NURSE PRACTITIONER

## 2019-03-06 PROCEDURE — 20000004 ZZH R&B ICU UMMC

## 2019-03-06 PROCEDURE — 00000146 ZZHCL STATISTIC GLUCOSE BY METER IP

## 2019-03-06 PROCEDURE — 99232 SBSQ HOSP IP/OBS MODERATE 35: CPT | Mod: GC | Performed by: ANESTHESIOLOGY

## 2019-03-06 PROCEDURE — 97530 THERAPEUTIC ACTIVITIES: CPT | Mod: GO | Performed by: OCCUPATIONAL THERAPIST

## 2019-03-06 PROCEDURE — 99291 CRITICAL CARE FIRST HOUR: CPT | Mod: GC | Performed by: INTERNAL MEDICINE

## 2019-03-06 PROCEDURE — 25000125 ZZHC RX 250: Performed by: INTERNAL MEDICINE

## 2019-03-06 PROCEDURE — 25000128 H RX IP 250 OP 636: Performed by: ANESTHESIOLOGY

## 2019-03-06 PROCEDURE — 97530 THERAPEUTIC ACTIVITIES: CPT | Mod: GP

## 2019-03-06 PROCEDURE — 25000132 ZZH RX MED GY IP 250 OP 250 PS 637: Performed by: SURGERY

## 2019-03-06 PROCEDURE — 84100 ASSAY OF PHOSPHORUS: CPT | Performed by: PHYSICIAN ASSISTANT

## 2019-03-06 PROCEDURE — 25000128 H RX IP 250 OP 636: Performed by: STUDENT IN AN ORGANIZED HEALTH CARE EDUCATION/TRAINING PROGRAM

## 2019-03-06 PROCEDURE — 97110 THERAPEUTIC EXERCISES: CPT | Mod: GO | Performed by: OCCUPATIONAL THERAPIST

## 2019-03-06 PROCEDURE — 83605 ASSAY OF LACTIC ACID: CPT | Performed by: INTERNAL MEDICINE

## 2019-03-06 PROCEDURE — 25800030 ZZH RX IP 258 OP 636: Performed by: INTERNAL MEDICINE

## 2019-03-06 PROCEDURE — 25000132 ZZH RX MED GY IP 250 OP 250 PS 637: Performed by: ANESTHESIOLOGY

## 2019-03-06 PROCEDURE — 80048 BASIC METABOLIC PNL TOTAL CA: CPT | Performed by: STUDENT IN AN ORGANIZED HEALTH CARE EDUCATION/TRAINING PROGRAM

## 2019-03-06 PROCEDURE — 80197 ASSAY OF TACROLIMUS: CPT | Performed by: INTERNAL MEDICINE

## 2019-03-06 RX ORDER — ALBUMIN, HUMAN INJ 5% 5 %
25 SOLUTION INTRAVENOUS ONCE
Status: COMPLETED | OUTPATIENT
Start: 2019-03-06 | End: 2019-03-06

## 2019-03-06 RX ORDER — ASPIRIN 81 MG/1
81 TABLET ORAL DAILY
Status: DISCONTINUED | OUTPATIENT
Start: 2019-03-06 | End: 2019-03-08

## 2019-03-06 RX ORDER — DOBUTAMINE HCL IN DEXTROSE 5 % 500MG/250
2.5 INTRAVENOUS SOLUTION INTRAVENOUS CONTINUOUS
Status: DISCONTINUED | OUTPATIENT
Start: 2019-03-06 | End: 2019-03-19

## 2019-03-06 RX ORDER — TACROLIMUS 1 MG/1
2 CAPSULE ORAL
Status: DISCONTINUED | OUTPATIENT
Start: 2019-03-06 | End: 2019-03-09

## 2019-03-06 RX ADMIN — SENNOSIDES AND DOCUSATE SODIUM 1 TABLET: 8.6; 5 TABLET ORAL at 19:55

## 2019-03-06 RX ADMIN — PREDNISONE 20 MG: 10 TABLET ORAL at 07:56

## 2019-03-06 RX ADMIN — OXYCODONE HYDROCHLORIDE 10 MG: 5 TABLET ORAL at 00:20

## 2019-03-06 RX ADMIN — SILDENAFIL 20 MG: 20 TABLET ORAL at 19:55

## 2019-03-06 RX ADMIN — DOBUTAMINE 2.5 MCG/KG/MIN: 12.5 INJECTION, SOLUTION INTRAVENOUS at 13:33

## 2019-03-06 RX ADMIN — FLUTICASONE PROPIONATE 2 PUFF: 220 AEROSOL, METERED RESPIRATORY (INHALATION) at 07:54

## 2019-03-06 RX ADMIN — DOBUTAMINE HYDROCHLORIDE 2.5 MCG/KG/MIN: 200 INJECTION INTRAVENOUS at 07:48

## 2019-03-06 RX ADMIN — PANTOPRAZOLE SODIUM 40 MG: 40 TABLET, DELAYED RELEASE ORAL at 07:56

## 2019-03-06 RX ADMIN — DULOXETINE 20 MG: 20 CAPSULE, DELAYED RELEASE ORAL at 07:56

## 2019-03-06 RX ADMIN — MYCOPHENOLATE MOFETIL 1500 MG: 500 TABLET ORAL at 07:54

## 2019-03-06 RX ADMIN — ACETAMINOPHEN 650 MG: 325 TABLET, FILM COATED ORAL at 12:12

## 2019-03-06 RX ADMIN — TERBUTALINE SULFATE 10 MG: 5 TABLET ORAL at 07:55

## 2019-03-06 RX ADMIN — Medication 500000 UNITS: at 08:07

## 2019-03-06 RX ADMIN — TERBUTALINE SULFATE 10 MG: 5 TABLET ORAL at 00:20

## 2019-03-06 RX ADMIN — TERBUTALINE SULFATE 10 MG: 5 TABLET ORAL at 15:49

## 2019-03-06 RX ADMIN — ACETAMINOPHEN 650 MG: 325 TABLET, FILM COATED ORAL at 17:40

## 2019-03-06 RX ADMIN — ALBUMIN HUMAN 25 G: 0.05 INJECTION, SOLUTION INTRAVENOUS at 10:08

## 2019-03-06 RX ADMIN — PREDNISONE 20 MG: 10 TABLET ORAL at 17:38

## 2019-03-06 RX ADMIN — Medication 500000 UNITS: at 15:49

## 2019-03-06 RX ADMIN — TACROLIMUS 2 MG: 1 CAPSULE ORAL at 17:38

## 2019-03-06 RX ADMIN — ACETAMINOPHEN 650 MG: 325 TABLET, FILM COATED ORAL at 04:26

## 2019-03-06 RX ADMIN — MESALAMINE 2400 MG: 800 TABLET, DELAYED RELEASE ORAL at 07:55

## 2019-03-06 RX ADMIN — ACETAMINOPHEN 650 MG: 325 TABLET, FILM COATED ORAL at 07:56

## 2019-03-06 RX ADMIN — TACROLIMUS 1.5 MG: 1 CAPSULE ORAL at 07:55

## 2019-03-06 RX ADMIN — MESALAMINE 2400 MG: 800 TABLET, DELAYED RELEASE ORAL at 19:57

## 2019-03-06 RX ADMIN — POLYETHYLENE GLYCOL 3350 17 G: 17 POWDER, FOR SOLUTION ORAL at 08:08

## 2019-03-06 RX ADMIN — MELATONIN TAB 3 MG 3 MG: 3 TAB at 19:55

## 2019-03-06 RX ADMIN — OXYCODONE HYDROCHLORIDE 5 MG: 5 TABLET ORAL at 13:48

## 2019-03-06 RX ADMIN — FLUTICASONE PROPIONATE 2 PUFF: 220 AEROSOL, METERED RESPIRATORY (INHALATION) at 19:56

## 2019-03-06 RX ADMIN — ASPIRIN 81 MG: 81 TABLET, COATED ORAL at 11:21

## 2019-03-06 RX ADMIN — HEPARIN SODIUM 950 UNITS/HR: 10000 INJECTION, SOLUTION INTRAVENOUS at 04:24

## 2019-03-06 RX ADMIN — OXYCODONE HYDROCHLORIDE 5 MG: 5 TABLET ORAL at 04:26

## 2019-03-06 RX ADMIN — MYCOPHENOLATE MOFETIL 1500 MG: 500 TABLET ORAL at 18:01

## 2019-03-06 RX ADMIN — LEVOTHYROXINE SODIUM 100 MCG: 100 TABLET ORAL at 07:56

## 2019-03-06 RX ADMIN — Medication 500000 UNITS: at 19:55

## 2019-03-06 RX ADMIN — ROSUVASTATIN CALCIUM 10 MG: 10 TABLET, FILM COATED ORAL at 07:55

## 2019-03-06 RX ADMIN — SILDENAFIL 20 MG: 20 TABLET ORAL at 07:56

## 2019-03-06 RX ADMIN — MONTELUKAST SODIUM 10 MG: 10 TABLET, COATED ORAL at 21:54

## 2019-03-06 RX ADMIN — SILDENAFIL 20 MG: 20 TABLET ORAL at 13:45

## 2019-03-06 RX ADMIN — SODIUM CHLORIDE 500 ML: 9 INJECTION, SOLUTION INTRAVENOUS at 05:19

## 2019-03-06 RX ADMIN — LIDOCAINE 1 PATCH: 560 PATCH PERCUTANEOUS; TOPICAL; TRANSDERMAL at 08:05

## 2019-03-06 RX ADMIN — SENNOSIDES AND DOCUSATE SODIUM 2 TABLET: 8.6; 5 TABLET ORAL at 07:56

## 2019-03-06 RX ADMIN — ACETAMINOPHEN 650 MG: 325 TABLET, FILM COATED ORAL at 00:20

## 2019-03-06 ASSESSMENT — PAIN DESCRIPTION - DESCRIPTORS
DESCRIPTORS: ACHING;CONSTANT

## 2019-03-06 ASSESSMENT — ACTIVITIES OF DAILY LIVING (ADL)
ADLS_ACUITY_SCORE: 11
ADLS_ACUITY_SCORE: 13
ADLS_ACUITY_SCORE: 11
ADLS_ACUITY_SCORE: 11

## 2019-03-06 NOTE — PLAN OF CARE
OT/4A:  Discharge Planner OT   Patient plan for discharge: rehab  Current status: Pt CGAx2 bed to chair transfer. Facilitated luci UE strengthening.   Barriers to return to prior living situation: medical status, activity tolerance, ADL I, mobility   Recommendations for discharge: ARU  Rationale for recommendations: to progress ADL I       Entered by: Vidya Salazar 03/06/2019 4:22 PM

## 2019-03-06 NOTE — CONSULTS
Nephrology Initial Consult  March 6, 2019      Everton Larios MRN:[id number] YOB: 1965      Date of Admission:2/17/2019  Primary care provider: Fredrick Wolff  Requesting physician: Yves Rodrigues MD    ASSESSMENT AND RECOMMENDATIONS:   Mr. Larios is a 53 year old male with history of chronic renal insufficiency, EtOH abuse, GIB w/splenic embolization, PAF s/p ablation and cardioversion, NICM c/b cardiogenic shock requiring IABP, s/p OHT (2/24/19) on tacrolimus and MMF, complicated by RV dysfunction per echo and acute oliguric kidney injury.       #Acute oliguric kidney injury  Patient with history of chronic renal insufficiency and cortical scarring of the L kidney per Renal US 03/2016. Baseline Crt 1.1- 1.4 over the last 6 months; however, prior BMPs have demonstrated patient Crt as high as 2.0. He most likely has a baseline level of CKD due to renal artery stenosis (longstanding smoking history, abdominal bruit on physical examination) and possible smaller FRANKLIN episodes from HFrEF exacerbations. Current rise in creatinine started 3/1 at 1.89, and increasing to 3.25 on 3/6. and oliguria is most likely due to nephrotoxicity associated with tacrolimus (a renal vasoconstrictor), and diuretic use. Moreover, there is mention in the transplant literature of the interaction between tacrolimus and valganciclovir leading to intra tubular obstruction. The use of PPIs alongside tacrolimus can lead to elevated tacro levels, with omeprazole being the worst offender of the class (Pharmacokinetics and Toxicity of Tacrolimus Early After Heart and Lung Transplantation PARVEZ Keith. Am J Transplant. 2015 Sep;15(9):2301-13. doi: 10.1111/ajt.16225. Epub 2015 Jun 4.).    Immunosuppressants:  MMF (2/27 - date)  Tacrolimus (2/28-date)    Last tacrolimus level: 11.7 (3/5/19)    Diuretics:  Bumex  gtt(3/2-3/3)  Lasix gtt (3/3-3/4)    #Electrolytes  Hyponatremia  Patient's decreasing sodium trend began 2/27, with 1-2 point drop daily and reaching its fam of 126 on 3/6. Etiologies to explain this include diuretic use and the use of nephrotoxic agents. Currently not displaying any neurological symptoms to require immediate sodium repletion at this time.    #Acid base status  Patient's acid base trend shows decreasing bicarb trend coinciding with the onset of FRANKLIN. Will continue to monitor closely alongside of the creatinine trend.    #Volume status  Patient appears clinically euvolemic, yet hemodynamics are indicative of elevated right sided filling pressures consistent with RV dysfunction and moderate tricuspid regurgitation as seen on the recent TTE. PCW ranges from 16-20; current CVP is 20 (3/6/19).     #Acute normocytic anemia  Patient's hemoglobin displays a gradual downward trend since postop day 1, where Hgb was 12.5 and is now 7.9. Unclear the etiology for the gradually decreasing hemoglobin trend, but will follow closely with daily CBC.    Recommendations  1. Recommend ordering urine Na to determine if this is prerenal or intrinsic  2. Recommend ordering Renal US w/dopplers to assess for renal artery stenosis, given physical exam findings.  3. Continue to trend creatinine, will reevaluate potential for dialysis daily.      Recommendations were verbally communicated to primary team.    Seen and discussed with Dr. Paul.    Yasmany Witt MD   Internal Medicine, PGY-1  [phone]      REASON FOR CONSULT:  Post heart transplant, rising creatinine, decreased urine output.    HISTORY OF PRESENT ILLNESS:  Admitting provider and nursing notes reviewed    Everton Larios is a 53 year old  Male with history of EtOH abuse, PAF s/p ablation and cardioversioncongenital ASD s/p surgical losure, GIB w/splenic embolization, HFrEF d/t NICM ho was admitted for evaluation of heart transplant that he received  on 2/24 that was complicated by moderate RV dysfunction and worsening renal function. Patient is followed by Dr. Donis of [facility] and was recently admitted 11/2018 for ambulatory cardiogenic shock requiring initiation of milrinone gtt. Prior to his admission, patient denied any PND, rare orthopnea; no fevers, chills, night sweats, CP, n/v/d, abdominal pain, dysuria or hematuria. During patient's hospitalization s/p OHT, he developed RV dysfunction, weight gain, and oliguria that improved once dobutamine gtt was started post operatively. He was started on tacrolimus, MMF and prednisone for immunosuppression, and developed an acute oliguric kidney injury with decreasing UOP. Diuretics were initially given due to elevated PCW pressure, but was ultimately discontinued given patient's increasing rise in creatinine. Nephrology was consulted to assist with the management of patient's rising creatinine, low urine output in the setting of recent heart transplant.    PAST MEDICAL HISTORY:  Reviewed with patient on 03/06/2019   Past Medical History:   Diagnosis Date     Alcohol abuse      Pierce's esophagus 10/4/2018     Chronic rhinitis 10/4/2018     Chronic systolic heart failure (H) 10/4/2018     Depression 10/4/2018     Diastolic dysfunction      DJD (degenerative joint disease) - neck 10/4/2018     H/O congenital atrial septal defect (ASD) repair at age 3 10/4/2018     Hypothyroidism due to medication 10/4/2018     ICD, [place] [employer] 2008; gen change 2/2018 10/4/2018     Intermittent asthma without complication 10/4/2018     Nonischemic cardiomyopathy (H) 10/4/2018     On amiodarone therapy 10/4/2018     Paroxysmal atrial fibrillation (H) 10/4/2018     S/P ablation of atrial fibrillation 10/4/2018     Systolic heart failure (H)      Ulcerative colitis (H) 10/4/2018     Ventricular tachycardia (H) 10/4/2018       Past Surgical History:   Procedure Laterality Date     AICD, DUAL CHAMBER       INSERT  INTRAAORTIC BALLOON PUMP Left 2/19/2019    Procedure: Insert left  Subclavian Balloon Pump,  Removal Right femoral arterial balloom pump sheath;  Surgeon: Dewayne House MD;  Location: [facility] OR     INSERT INTRAAORTIC BALLOON PUMP Left 2/21/2019    Procedure: SUBCLAVIAN BALLOON PUMP PLACEMENT;  Surgeon: Ben White MD;  Location: [facility] OR     TRANSPLANT HEART RECIPIENT N/A 2/24/2019    Procedure: TRANSPLANT HEART RECIPIENT;  Surgeon: Dewayne House MD;  Location: [facility] OR        MEDICATIONS:  PTA Meds  Prior to Admission medications    Medication Sig Last Dose Taking? Auth Provider   albuterol (PROAIR HFA/PROVENTIL HFA/VENTOLIN HFA) 108 (90 Base) MCG/ACT inhaler Inhale 2 puffs into the lungs every 6 hours as needed for shortness of breath / dyspnea or wheezing Past Month at Unknown time Yes    Alpha-D-Galactosidase (BEANO PO) Take 2 tablets by mouth 3 times daily  Past Month at Unknown time Yes Reported, Patient   amiodarone (PACERONE/CODARONE) 200 MG tablet Take 1 tablet (200 mg) by mouth daily Past Month at Unknown time Yes    DULoxetine (CYMBALTA) 20 MG EC capsule Take 20 mg by mouth daily 3/1/2019 at Unknown time Yes Unknown, Entered By History   Ergocalciferol (VITAMIN D2 PO)  Past Month at Unknown time Yes Unknown, Entered By History   fluticasone (FLOVENT HFA) 220 MCG/ACT Inhaler Inhale 2 puffs into the lungs 2 times daily 3/1/2019 at Unknown time Yes Unknown, Entered By History   furosemide (LASIX) 20 MG tablet Take 2 tablets (40 mg) by mouth 2 times daily 2/28/2019 at Unknown time Yes Corinna Donis MD   levothyroxine (SYNTHROID/LEVOTHROID) 100 MCG tablet Take 100 mcg by mouth daily  3/1/2019 at Unknown time Yes Unknown, Entered By History   mesalamine (ASACOL HD) 800 MG EC tablet Take 3 tablets (2,400 mg) by mouth 2 times daily 3/1/2019 at Unknown time Yes    milrinone (PRIMACOR) infusion 200 mcg/mL PREMIX Inject 6.6375 mcg/min into the vein continuous Past Month at Unknown time Yes Jareth  MD Luis Carlos   montelukast (SINGULAIR) 10 MG tablet Take 1 tablet (10 mg) by mouth At Bedtime 3/1/2019 at Unknown time Yes    omeprazole (PRILOSEC) 40 MG capsule Take 1 capsule (40 mg) by mouth daily In pm Past Month at Unknown time Yes    ondansetron (ZOFRAN-ODT) 4 MG ODT tab Take 1 tablet (4 mg) by mouth every 8 hours as needed for nausea Past Week at Unknown time Yes    potassium chloride ER (K-DUR/KLOR-CON M) 20 MEQ CR tablet Take 40 mg in the am and 20 mg in the afternoon. 2/28/2019 at Unknown time Yes My Norton, NP   spironolactone (ALDACTONE) 25 MG tablet Take 25 mg by mouth daily Past Month at Unknown time Yes Unknown, Entered By History   tamsulosin (FLOMAX) 0.4 MG capsule Take 1 capsule (0.4 mg) by mouth daily (with dinner) Past Month at Unknown time Yes    warfarin (COUMADIN) 5 MG tablet Take 2.5 mg on Sun/Thurs and 5 mg on all other days of the week Past Month at Unknown time Yes    BETA BLOCKER NOT PRESCRIBED, INTENTIONAL, Beta Blocker not prescribed intentionally due to Recent tx with IV positive inotropic agent  Patient not taking: Reported on 1/30/2019   Luis Carlos Templeton MD      Current Meds    aspirin  81 mg Oral Daily     DULoxetine  20 mg Oral Daily     fluticasone  2 puff Inhalation Q12H     heparin lock flush  5 mL Intravenous Q24H     insulin aspart  1-7 Units Subcutaneous TID AC     insulin aspart  1-5 Units Subcutaneous At Bedtime     levothyroxine  100 mcg Oral Daily     lidocaine  1 patch Transdermal Q24H     lidocaine   Transdermal Q8H     lidocaine   Transdermal Q24h     melatonin  3 mg Oral At Bedtime     mesalamine  2,400 mg Oral BID     montelukast  10 mg Oral At Bedtime     mycophenolate  1,500 mg Oral BID IS     nystatin  500,000 Units Oral 4x Daily     pantoprazole  40 mg Oral QAM AC     polyethylene glycol  17 g Oral Daily     predniSONE  20 mg Oral BID w/meals     rosuvastatin  10 mg Oral Daily     senna-docusate  1 tablet Oral BID    Or     senna-docusate  2 tablet Oral  BID     sildenafil  20 mg Oral TID     sodium chloride (PF)  3 mL Intracatheter Q8H     tacrolimus  1.5 mg Oral QAM     tacrolimus  2 mg Oral QPM     terbutaline  10 mg Oral Q8H     [START ON 3/9/2019] valGANciclovir  450 mg Oral Once per day on Tue Sat     Infusion Meds    DOBUTamine       DOBUTamine 2.5 mcg/kg/min (03/06/19 0900)     HEParin 950 Units/hr (03/06/19 0900)     - MEDICATION INSTRUCTIONS -       - MEDICATION INSTRUCTIONS -       BETA BLOCKER NOT PRESCRIBED         ALLERGIES:    Allergies   Allergen Reactions     Hydromorphone Other (See Comments)     Significant Delirium     Lisinopril Other (See Comments)     hypotension     Codeine Nausea       REVIEW OF SYSTEMS:  A comprehensive of systems was negative except as noted above.    SOCIAL HISTORY:   Social History     Socioeconomic History     Marital status: Single     Spouse name: Not on file     Number of children: Not on file     Years of education: Not on file     Highest education level: Not on file   Occupational History     Not on file   Social Needs     Financial resource strain: Not on file     Food insecurity:     Worry: Not on file     Inability: Not on file     Transportation needs:     Medical: Not on file     Non-medical: Not on file   Tobacco Use     Smoking status: Former Smoker     Years: 10.00     Smokeless tobacco: Never Used   Substance and Sexual Activity     Alcohol use: Yes     Alcohol/week: 0.6 oz     Types: 1 Cans of beer per week     Comment: a couple times a week      Drug use: No     Sexual activity: Not on file   Lifestyle     Physical activity:     Days per week: Not on file     Minutes per session: Not on file     Stress: Not on file   Relationships     Social connections:     Talks on phone: Not on file     Gets together: Not on file     Attends Yazidi service: Not on file     Active member of club or organization: Not on file     Attends meetings of clubs or organizations: Not on file     Relationship status: Not on  "file     Intimate partner violence:     Fear of current or ex partner: Not on file     Emotionally abused: Not on file     Physically abused: Not on file     Forced sexual activity: Not on file   Other Topics Concern     Not on file   Social History Narrative     Not on file     Reviewed with patient       FAMILY MEDICAL HISTORY:   No family history on file.  Reviewed with patient     PHYSICAL EXAM:   Temp  Av  F (36.7  C)  Min: 95.9  F (35.5  C)  Max: 100  F (37.8  C)  Arterial Line BP  Min: 68/56  Max: 182/36  Arterial Line BP 2  Min: 90/54  Max: 107/56  Arterial Line MAP (mmHg)  Av.7 mmHg  Min: 57 mmHg  Max: 123 mmHg Arterial Line Location 2: Right femoral    Pulse  Av.6  Min: 59  Max: 140 Resp  Av.5  Min: 8  Max: 45  FiO2 (%)  Av %  Min: 40 %  Max: 100 %  SpO2  Av.1 %  Min: 88 %  Max: 100 %    CVP (mmHg): 14 mmHg  BP (!) 106/95   Pulse 92   Temp 97.5  F (36.4  C) (Oral)   Resp 10   Ht 1.727 m (5' 8\")   Wt 62.1 kg (136 lb 14.5 oz)   SpO2 97%   BMI 20.82 kg/m     Date 19 0700 - 19 0659   Shift 8930-3181 4687-5549 8662-6394 24 Hour Total   INTAKE   P.O. 90   90   I.V. 70.8   70.8   IV Piggyback 250   250   Shift Total(mL/kg) 410.8(6.62)   410.8(6.62)   OUTPUT   Other 175   175   Chest Tube(mL/kg) 70(1.13)   70(1.13)   Shift Total(mL/kg) 245(3.95)   245(3.95)   Weight (kg) 62.1 62.1 62.1 62.1      Admit Weight: 63.5 kg (140 lb 1.6 oz)     GENERAL APPEARANCE: No acute distress, awake and conversing normally.  EYES: No scleral icterus, pupils equal  Lymphatics: no cervical or supraclavicular LAD  Pulmonary: lungs clear to auscultation with equal breath sounds bilaterally, speaking in full sentences  CV: regular rhythm, normal rate, no rub   - JVD: no JVD on examination   - Edema: Trace pedal edema up to the knees bilaterally  GI: soft, nontender, normal bowel sounds. Bilateral abdominal bruit on auscultation  MS: no evidence of inflammation in joints, no muscle " tenderness  SKIN: no rash, warm, dry, no cyanosis  NEURO: face symmetric,  no asterixis     LABS:   CMP  Recent Labs   Lab 03/06/19  0422 03/05/19  1547 03/05/19  0346 03/04/19  1603 03/04/19  0412  03/03/19  0329  03/02/19  0930   * 130* 127* 129* 130*   < > 132*   < > 128*   POTASSIUM 4.6 4.0 4.2 4.1 4.2   < > 4.0   < > 4.2   CHLORIDE 93* 97 94 96 96   < > 98   < > 94   CO2 17* 19* 19* 18* 18*   < > 19*   < > 21   ANIONGAP 17* 14 14 15* 16*   < > 15*   < > 12   * 89 107* 119* 108*   < > 114*   < > 160*   BUN 97* 86* 91* 84* 79*   < > 70*   < > 69*   CR 3.25* 2.82* 2.83* 2.31* 2.63*   < > 2.12*   < > 2.09*   GFRESTIMATED 20* 24* 24* 30* 26*   < > 34*   < > 34*   GFRESTBLACK 23* 28* 28* 35* 30*   < > 39*   < > 40*   RADAMES 7.9* 7.3* 7.9* 7.7* 7.8*   < > 7.6*   < > 7.8*   MAG 3.1* 2.8* 2.9* 2.7* 2.7*   < > 2.7*   < >  --    PHOS 7.7*  --  7.1*  --  6.7*  --  5.4*   < >  --    PROTTOTAL 5.6*  --  5.6*  --   --   --   --   --  5.6*   ALBUMIN 3.2*  --  2.9*  --   --   --   --   --  2.8*   BILITOTAL 0.8  --  1.0  --   --   --   --   --  0.9   ALKPHOS 100  --  102  --   --   --   --   --  87   AST 11  --  16  --   --   --   --   --  26   ALT 21  --  23  --   --   --   --   --  36    < > = values in this interval not displayed.     CBC  Recent Labs   Lab 03/06/19  0422 03/05/19  1547 03/05/19  0346 03/04/19  1603   HGB 8.3* 8.3* 8.2* 8.4*   WBC 16.0* 21.0* 19.7* 17.7*   RBC 2.78* 2.77* 2.75* 2.78*   HCT 25.7* 25.4* 25.4* 25.8*   MCV 92 92 92 93   MCH 29.9 30.0 29.8 30.2   MCHC 32.3 32.7 32.3 32.6   RDW 14.8 14.7 14.8 14.7    203 217 220     INRNo lab results found in last 7 days.  ABG  Recent Labs   Lab 03/06/19  0408 03/05/19  2208 03/05/19  1547 03/05/19  0924   O2PER 21.0 21 2L 2L      URINE STUDIES  Recent Labs   Lab Test 03/04/19  1303 02/23/19  1921 02/18/19  0957 11/15/18  1000   COLOR Yellow Yellow Yellow Yellow   APPEARANCE Clear Clear Clear Clear   URINEGLC Negative Negative Negative Negative    URINEBILI Negative Negative Negative Negative   URINEKETONE Negative Negative Negative Negative   SG 1.014 1.008 1.008 1.015   UBLD Negative Negative Negative Negative   URINEPH 5.0 6.0 5.5 5.0   PROTEIN Negative Negative Negative Negative   NITRITE Negative Negative Negative Negative   LEUKEST Negative Negative Negative Negative   RBCU 0 <1 <1 <1   WBCU 0 <1 1 1     No lab results found.  PTH  No lab results found.  IRON STUDIES  Recent Labs   Lab Test 11/15/18  0955 10/11/18  1235   IRON  --  63   FEB  --  457*   IRONSAT  --  14*   NATE 1,045*  --        IMAGING:  No new imaging studies at this time.    Yasmany Witt MD     I, Abhijeet Paul, saw this patient on 03/06/2019 with Dr. Witt and agree with the findings and plan of care as documented in the note.  Likely has afferent arteriolar vasoconstrictive effects of tacrolimus.  May have microvascular or microvascular arterial disease given kidney size asymmetry.  If renal dysfunction persists, would consider increasing IV fluid.

## 2019-03-06 NOTE — PROGRESS NOTES
D/I/A: 54 y/o M admitted 2/17/19  in cardiogenic shock; heart transplant 2/24/19. Lateral transfer from 4E to 4A on 3/5/19.     Neuro: AxOx4, call light appropriate. Stood at bedside/sat at edge of bed x2 overnight. Up with SBA. C/O Lt neck pain at swan site, PRN oxy and tylenol given with good relief.   CV: Afebrile. HR SR 90s, no ectopy noted. -110s. [place] in place, PA 30s/10-20s, JOSEPHINE calculated Q6hr (CO 4.9, CI 2.8, CVP 20). Temp pacer wires in place but capped - temp pacer equipment at bedside.   Pulm: On RA, LS clear, diminished in based. Some dyspnea on exertion noted. Pleural CT x2, moderate serosanguinous output noted, see flow sheet.   GI: On regular, 2L fluid restricted diet. Last BM 3/5, getting scheduled bowel meds.   : Voids in urinal, ~150cc all shift, MD aware. Creatinine elevated.   Skin: CT and temp pacer wire dressing changed. Bilateral upper chest, and sternal incision ELEONORA.   Access: Lt internal jugular swan @ 55. Lt PIV x1.   Drips: Dobutamine @ 2.5 mcg/kg/min and Heparin @ 950 units/hr - Hep XA level therapeutic.   Labs: Sodium 126, Creatinine 3.25, CARDs 2 resident notified and 500 NS bolus x1 ordered to be given over 4 hrs.     Plan: Continue to monitor Q6hr JOSEPHINE #s. Reassess labs, treat as needed. Will continue to monitor closely.

## 2019-03-06 NOTE — PROGRESS NOTES
ADVANCED HEART FAILURE PROGRESS NOTE     SUBJECTIVE:  No acute events, reports feeling a little better this morning.     ROS otherwise negative.    OBJECTIVE:  Vital signs:  Temp: 97.5  F (36.4  C) Temp  Min: 96.5  F (35.8  C)  Max: 97.5  F (36.4  C)  Heart Rate: 91 Heart Rate  Min: 88  Max: 96  BP: 106/56 Systolic (24hrs), Av , Min:89 , Max:123   Diastolic (24hrs), Av, Min:51, Max:67    Resp: 14 Resp  Min: 8  Max: 26  SpO2: 97 % SpO2  Min: 95 %  Max: 100 %    [place]: RA 14, PA 34/16, PCW 15, PA sat 58%, CO/CI 4.9/2.8,       Intake/Output Summary (Last 24 hours) at 3/6/2019 0644  Last data filed at 3/6/2019 0500  Gross per 24 hour   Intake 2304.98 ml   Output 980 ml   Net 1324.98 ml       Vitals:    19 0400 19 0400 19 0400   Weight: 61.6 kg (135 lb 12.9 oz) 61.7 kg (136 lb 0.4 oz) 62.1 kg (136 lb 14.5 oz)       Physical Exam:  GEN:  Alert, interactive, appears comfortable, NAD.  HEENT:  [place] R internal jugular with saturated bandage  CV:  Regular rate and rhythm, no murmur or JVD.   CHEST: Subclavian incision sutured.  Chest tubes.  LUNGS:  On room air, normal work of breathing, Clear to auscultation bilaterally, no wheezes or crackles.   ABD:  Active bowel sounds, soft, non-tender/non-distended.  No rebound/guarding/rigidity.  EXT:  No edema or cyanosis.  Hands/feet warm to touch with good signs of peripheral perfusion.   NEURO:  Alert and oriented, no new focal deficits appreciated.  PSCYH: Normal mood and affect      Medications:    DOBUTamine 2.5 mcg/kg/min (19 0400)     HEParin 950 Units/hr (19 05)     - MEDICATION INSTRUCTIONS -       - MEDICATION INSTRUCTIONS -       BETA BLOCKER NOT PRESCRIBED         Current Facility-Administered Medications   Medication Dose Route Frequency     sodium chloride 0.9%  500 mL Intravenous Once     DULoxetine  20 mg Oral Daily     fluticasone  2 puff Inhalation Q12H     heparin lock flush  5 mL Intravenous Q24H     insulin aspart   1-7 Units Subcutaneous TID AC     insulin aspart  1-5 Units Subcutaneous At Bedtime     levothyroxine  100 mcg Oral Daily     lidocaine  1 patch Transdermal Q24H     lidocaine   Transdermal Q8H     lidocaine   Transdermal Q24h     melatonin  3 mg Oral At Bedtime     mesalamine  2,400 mg Oral BID     montelukast  10 mg Oral At Bedtime     mycophenolate  1,500 mg Oral BID IS     nystatin  500,000 Units Oral 4x Daily     pantoprazole  40 mg Oral QAM AC     polyethylene glycol  17 g Oral Daily     predniSONE  20 mg Oral BID w/meals     rosuvastatin  10 mg Oral Daily     senna-docusate  1 tablet Oral BID    Or     senna-docusate  2 tablet Oral BID     sildenafil  20 mg Oral TID     sodium chloride (PF)  3 mL Intracatheter Q8H     tacrolimus  2 mg Oral BID IS     terbutaline  10 mg Oral Q8H     [START ON 3/9/2019] valGANciclovir  450 mg Oral Once per day on Tue Sat         Labs:  CMP  Recent Labs   Lab 03/06/19  0422 03/05/19  1547 03/05/19  0346   * 130* 127*   POTASSIUM 4.6 4.0 4.2   CHLORIDE 93* 97 94   CO2 17* 19* 19*   BUN 97* 86* 91*   CR 3.25* 2.82* 2.83*   RADAMES 7.9* 7.3* 7.9*   MAG 3.1* 2.8* 2.9*   PHOS 7.7*  --  7.1*   * 89 107*   ALKPHOS 100  --  102   BILITOTAL 0.8  --  1.0   AST 11  --  16   ALT 21  --  23     BLOOD GASNo lab results found in last 7 days.  CBC  Recent Labs   Lab 03/06/19  0422 03/05/19  1547   WBC 16.0* 21.0*   HGB 8.3* 8.3*   HCT 25.7* 25.4*    203     COAGNo lab results found in last 7 days.      ASSESSMENT/PLAN:  Everton Larios is a 54 yo gentleman who underwent orthotopic heart transplant 2/24/19 for non ischemic cardiomyopathy and cardiogenic shock. Post-op complicated by RV dysfunction and FRANKLIN.     Updates today:  Creatinine rising. Nephrology consulted, will check urine lytes and renal US. Trialing fluid challenge.        #S/p OHT on 2/24:  Graft function:  - echo 3/1 shows normal LV function, RV is mild to moderately dilated and hypokinetic  - repeat echo  today  - dobutamine at 5 for continue RV and hemodynamic support  - continue sildenafil 20 q8h  - trial of fluid for FRANKLIN  - off isuprel, continue terbutaline 10 q8h     Immunosuppression:  - on MMF 1500 BID  - s/p methylpred 125 x 3 doses, continue prednisone 20mg BID, taper by 5 daily with negative biopsies  - NOT Simulect protocol, on tacro 1.5mg in AM and 2mg in PM, following levels     Prophylaxis:  - CMV +/+: medium risk, start renal dose Valcyte  - PCP: holding Bactrim for now, pentamadine given 3/3  - Thrush: start nystatin  - GI: on PPI  - CAV: start Crestor, holding ASA given bleeding at line sites     Biopsies:  - First biopsy Monday 3/4, 0R, no AMR  - second biopsy 3/11     Goretex conduit for persistent left SVC:  - will need anticoagulation, on low intensity heparin      Oliguric renal failure:  - renal function continues to worsen  - nephrology consulted, appreciate assistance  - renal duplex US ordered  - trial of fluid repletion today        Seen and staffed with Dr. Shilo Harris MD  Advanced Heart Failure Fellow  [phone]    I have reviewed today's vital signs, notes, medications, labs and imaging.  I have also seen and examined the patient and agree with the findings and plan as outlined above.  Pt with no new complaints.  VSS with aftevrile, HR 92, 106/58, CVP 20, CO 4.9,  on Dbx 2.5.  Lungs clear and tachy S1 and S2 with no gallop.  Labs with Cr 3.25 and WBC 16 with Hgb 8.3.  Assessment: Pt with new OHT with course complicated by RV dysfn on sildenafil and Dbx.  Will give fluid challenge today and consult renal service.  Pt seen X3 today for total critical care time 40 min. Will repeat tac level tomorrow.     Stevie Lao MD, PhD  Professor, Heart Failure and Cardiac Transplantation  [facility]

## 2019-03-06 NOTE — PLAN OF CARE
Discharge Planner PT   Patient plan for discharge: Rehab  Current status: Focus on IND with mobility within precautions.  Pt needing Joelle for bed mobility with use of log roll.  Pt completed sit<>stand several times with Joelle at trunk.  Pt ambulated 60' x 2 with Joelle at gait belt, w/c follow for safety, increased instability noted.   Barriers to return to prior living situation: Need for increased assist with bed mobility, transfers and ambulation, medical needs  Recommendations for discharge: ARU  Rationale for recommendations: To increase strength and IND with mobility       Entered by: Maureen Jackson 03/06/2019 4:27 PM

## 2019-03-06 NOTE — PROGRESS NOTES
CVTS PROGRESS NOTE  2019      CO-MORBIDITIES:   Chronic systolic heart failure (H)  (primary encounter diagnosis)  Acute on chronic systolic congestive heart failure (H)    ASSESSMENT: Everton Larios is a 53 year old male with PMH etoh abuse, hypothyroidism, intermittent asthma, ulcerative colitis, Depression, Chronic HFrEF, NICM admitted with cardiogenic shock requiring subclavian balloon pump now s/p OHT 19 with [facility] and Christopher.     TODAY'S PROGRESS:   - Continue dobutamine 2.5   - Tacrolimus  per Cards II  - Fluid challenge with 5% albumin   - Monitor hyponatremia q6h   - Nephrology consult for FRANKLIN  - Start aspirin 81 mg    PLAN:  Neuro/ pain/ sedation:  - Monitor neurological status. Notify the MD for any acute changes in exam.  - Tylenol, oxycodone and PRN fentanyl     #Depression  - PTA cymbalta 20mg  - Melatonin for sleep     Pulmonary care:   - Doing well on room air  - Supp oxygen to maintain SpO2 >92%  - Encourage IS and deep breathing    #Intermittent asthma  - PTA Montelukast ordered    Cardiovascular: HR goal >95. Echo 3/5 with good LV function, mild decreased RV function (moderately dilated)  - Monitor hemodynamic status.   - MAP >65. HR goal > 95.  - Dobutamine 2.5 mcg/kg/min for RV support  - Sildenafil 20 q8h   - Terbutaline 10 mg q8h  - Start aspirin 81 mg.  - Continue statin.     GI care:   - Regular diet  - Senna, miralax and suppository for bowel regimen; MoM and miralax prn     #Intraperitoneal free air (CT obtained )  - Small peritoneal defect made during redo operation, closed with stitches intra-op- likely source of free air. No peritoneal signs, pain improved.     #Pierce's esophagus  #Ulcerative colitis  -PTA Mesalamine     Fluids/ Electrolytes/ Nutrition:   - TKO for IV fluid hydration  - No indication for parenteral nutrition.    Renal/ Fluid Balance: Cr up to 3.25 today. Slow increase in Cr, likely secondary to significant diuresis, uop now  decreasing.  - Will continue to monitor intake and output.  - PTA Tamsulosin      Endocrine:    # Stress hyperglycemia  - sliding scale insulin as needed  # hypothyroidism  - Home levothyroxine      ID/ Antibiotics:  #Leukocytosis  - Pan cultured 3/4 for leukocytosis- NGTD.  - Perioperative antibiotics vanc/zosyn x 3 days- completed.  - Valcyte for CMV prophylaxis; holding bactrim for PCP prophylaxis.  - Nystatin for thrush     Heme:     #Bypass of persistent left SVC to R atrial appendage with [place]-Davis graft. Will require lifelong anti-coagulation for graft  - Hgb goal >7. Daily CBC.  - Low intensity heparin gtt    Prophylaxis:    - Mechanical prophylaxis for DVT  - Heparin as above  - Protonix qd     Lines/ tubes/ drains:  - RIJ, [place], Arterial line, L pleural chest tube     Disposition:  - CV ICU.     Patient seen, findings and plan discussed with CVTS fellow.    Leyla Bowling MD  General Surgery PGY-3  ====================================    SUBJECTIVE:   UOP decreasing; cr rising  Mild left neck pain   Tolerating diet, having bowel function    OBJECTIVE:   1. VITAL SIGNS:   Temp:  [96.5  F (35.8  C)-97.5  F (36.4  C)] 97.5  F (36.4  C)  Pulse:  [88-93] 92  Heart Rate:  [88-93] 92  Resp:  [8-26] 26  BP: ()/(56-95) 105/62  SpO2:  [95 %-100 %] 98 %  Resp: 26      2. INTAKE/ OUTPUT:   I/O last 3 completed shifts:  In: 2574.58 [P.O.:1370; I.V.:454.58; IV Piggyback:250]  Out: 1010 [Urine:350; Other:150; Chest Tube:510]    3. PHYSICAL EXAMINATION:     General: very pleasant, appropriately interactive   Neuro: Alert and oriented by 3, no focal deficits  Resp: CTAB on room air  CV: Regular rate, regular rhythm, serosanguinous output from L pleural chest tube, trace pedal edema  Abdomen: Soft, Non-distended, non-tender  Incisions: c/d/i  Extremities: warm and well perfused    4. INVESTIGATIONS:   Arterial Blood Gases   No lab results found in last 7 days.  Complete Blood Count   Recent Labs   Lab 03/06/19  7057  03/05/19  1547 03/05/19  0346 03/04/19  1603   WBC 16.0* 21.0* 19.7* 17.7*   HGB 8.3* 8.3* 8.2* 8.4*    203 217 220     Basic Metabolic Panel  Recent Labs   Lab 03/06/19  0422 03/05/19  1547 03/05/19  0346 03/04/19  1603   * 130* 127* 129*   POTASSIUM 4.6 4.0 4.2 4.1   CHLORIDE 93* 97 94 96   CO2 17* 19* 19* 18*   BUN 97* 86* 91* 84*   CR 3.25* 2.82* 2.83* 2.31*   * 89 107* 119*     Liver Function Tests  Recent Labs   Lab 03/06/19  0422 03/05/19  0346 03/02/19  0930   AST 11 16 26   ALT 21 23 36   ALKPHOS 100 102 87   BILITOTAL 0.8 1.0 0.9   ALBUMIN 3.2* 2.9* 2.8*     Pancreatic Enzymes  No lab results found in last 7 days.  Coagulation Profile  No lab results found in last 7 days.      5. RADIOLOGY:   Recent Results (from the past 24 hour(s))   XR Chest Port 1 View   Result Value    Radiologist flags [place] Liz catheter - recommend retraction (Urgent)    Narrative    EXAMINATION:  XR CHEST PORT 1 VW 3/5/2019 11:40 AM.    COMPARISON: Same day chest x-ray.    HISTORY:  eval swan position    FINDINGS: Single supine AP radiograph of the chest. Postoperative  changes of heart transplant with intact median sternotomy wires and  surgical clips. Left internal jugular approach (via persistent left  SVC) [place]-Liz catheter tip projecting over the distal right  interlobar artery. Interval removal of right internal jugular sheath.  Interval removal of mediastinal drains. Epicardial pacer wires.  Surgical clips projecting over the left axilla. Unchanged right and  left basilar chest tubes. Unchanged metallic foreign body projecting  over the right SVC.     Trachea is midline. Cardiac mediastinal silhouette is unchanged.  Unchanged diffuse interstitial opacities. No pleural effusion. Small  right apical pneumothorax. No left pneumothorax. No new focal  pulmonary opacity. Upper abdomen and bones are unremarkable.      Impression    IMPRESSION:   1. Left internal jugular approach (via persistent left SVC)  [place]-Liz  catheter tip projects over the right interlobar artery. Recommend  retraction.  2. Pulmonary venous congestion.    [Access Center: [place] Liz catheter - recommend retraction]    This report will be copied to the [facility] to ensure a  provider acknowledges the finding. [facility] Center is available Monday  through Friday 8am-3:30 pm.        I have personally reviewed the examination and initial interpretation  and I agree with the findings.    KISHA GUAMAN MD   XR Chest Port 1 View    Narrative    Exam: XR CHEST PORT 1 VW, 3/5/2019 5:33 PM    Indication: swan catheter repositioning    Comparison: Same-day chest radiograph    Findings:   AP view of the chest. Left IJ [place]-Liz catheter follows abnormal  course via presumed persistent left-sided SVC; tip now projects over  the central/mid right main pulmonary artery. Extensive postsurgical  changes of the chest. Intact median sternotomy wires. Bibasilar chest  tubes are stable.    Cardiomediastinal silhouette is within normal limits. No new focal  airspace consolidation, pleural effusion or pneumothorax. Prominent  interstitial opacities in both lungs similar to prior exam. Upper  abdomen is not well visualized. Left axillary clips.      Impression    Impression:   1. [place]-Liz catheter with the tip now projecting over the central/mid  right main pulmonary artery. Additional lines and tubes as above.  2. Diffuse interstitial opacities of both lungs are not significantly  changed. Findings consistent with pulmonary edema. No new focal  consolidation.    I have personally reviewed the examination and initial interpretation  and I agree with the findings.    MICHAEL HOLDER MD       =========================================

## 2019-03-06 NOTE — PROGRESS NOTES
CV ICU PROGRESS NOTE  2019      CO-MORBIDITIES:   Chronic systolic heart failure (H)  (primary encounter diagnosis)  Acute on chronic systolic congestive heart failure (H)    ASSESSMENT: Everton Larios is a 53 year old male with PMH etoh abuse, hypothyroidism, intermittent asthma, ulcerative colitis, Depression, Chronic HFrEF, NICM admitted with cardiogenic shock requiring subclavian balloon pump now s/p OHT 19 with [facility] and [facility].     TODAY'S PROGRESS:   - Continue dobutamine 2.5   - Tacrolimus  per Cards II  - Fluid challenge with 5% albumin   - Monitor hyponatremia q6h   - Nephrology consult for FRANKLIN  - Start aspirin 81 mg    PLAN:  Neuro/ pain/ sedation:  - Monitor neurological status. Notify the MD for any acute changes in exam.  - Tylenol, oxycodone and PRN fentanyl     #Depression  - PTA cymbalta 20mg  - Melatonin for sleep     Pulmonary care:   - Doing well on room air  - Supp oxygen to maintain SpO2 >92%  - Encourage IS and deep breathing    #Intermittent asthma  - PTA Montelukast ordered    Cardiovascular: HR goal >95. Echo 3/5 with good LV function, mild decreased RV function (moderately dilated)  - Monitor hemodynamic status.   - MAP >65. HR goal > 95.  - Dobutamine 2.5 mcg/kg/min for RV support  - Sildenafil 20 q8h   - Terbutaline 10 mg q8h  - Start aspirin 81 mg.  - Continue statin.     GI care:   - Regular diet  - Senna, miralax and suppository for bowel regimen; MoM and miralax prn     #Intraperitoneal free air (CT obtained )  - Small peritoneal defect made during redo operation, closed with stitches intra-op- likely source of free air. No peritoneal signs, pain improved.     #Pierce's esophagus  #Ulcerative colitis  -PTA Mesalamine     Fluids/ Electrolytes/ Nutrition:   - TKO for IV fluid hydration  - No indication for parenteral nutrition.    Renal/ Fluid Balance: Cr up to 3.25 today. Slow increase in Cr, likely secondary to significant diuresis, uop now  decreasing.  - Will continue to monitor intake and output.  - PTA Tamsulosin      Endocrine:    # Stress hyperglycemia  - sliding scale insulin as needed  # hypothyroidism  - Home levothyroxine      ID/ Antibiotics:  #Leukocytosis  - Pan cultured 3/4 for leukocytosis- NGTD.  - Perioperative antibiotics vanc/zosyn x 3 days- completed.  - Valcyte for CMV prophylaxis; holding bactrim for PCP prophylaxis.  - Nystatin for thrush     Heme:     #Bypass of persistent left SVC to R atrial appendage with [place]-Davis graft. Will require lifelong anti-coagulation for graft  - Hgb goal >7. Daily CBC.  - Low intensity heparin gtt    Prophylaxis:    - Mechanical prophylaxis for DVT  - Heparin as above  - Protonix qd     Lines/ tubes/ drains:  - RIJ, [place], Arterial line, L pleural chest tube     Disposition:  - CV ICU.     Patient seen, findings and plan discussed with CV ICU staff, Dr. Henderson.    Leyla Bowling MD  General Surgery PGY-3  ====================================    SUBJECTIVE:   UOP decreasing; cr rising  Mild left neck pain   Tolerating diet, having bowel function    OBJECTIVE:   1. VITAL SIGNS:   Temp:  [96.5  F (35.8  C)-97.5  F (36.4  C)] 97.5  F (36.4  C)  Pulse:  [88-93] 93  Heart Rate:  [88-93] 93  Resp:  [8-26] 17  BP: ()/(56-67) 115/58  SpO2:  [95 %-100 %] 100 %  Resp: 17      2. INTAKE/ OUTPUT:   I/O last 3 completed shifts:  In: 2574.58 [P.O.:1370; I.V.:454.58; IV Piggyback:250]  Out: 1010 [Urine:350; Other:150; Chest Tube:510]    3. PHYSICAL EXAMINATION:     General: very pleasant, appropriately interactive   Neuro: Alert and oriented by 3, no focal deficits  Resp: CTAB on room air  CV: Regular rate, regular rhythm, serosanguinous output from L pleural chest tube, trace pedal edema  Abdomen: Soft, Non-distended, non-tender  Incisions: c/d/i  Extremities: warm and well perfused    4. INVESTIGATIONS:   Arterial Blood Gases   No lab results found in last 7 days.  Complete Blood Count   Recent Labs   Lab  03/06/19  0422 03/05/19  1547 03/05/19  0346 03/04/19  1603   WBC 16.0* 21.0* 19.7* 17.7*   HGB 8.3* 8.3* 8.2* 8.4*    203 217 220     Basic Metabolic Panel  Recent Labs   Lab 03/06/19  0422 03/05/19  1547 03/05/19  0346 03/04/19  1603   * 130* 127* 129*   POTASSIUM 4.6 4.0 4.2 4.1   CHLORIDE 93* 97 94 96   CO2 17* 19* 19* 18*   BUN 97* 86* 91* 84*   CR 3.25* 2.82* 2.83* 2.31*   * 89 107* 119*     Liver Function Tests  Recent Labs   Lab 03/06/19  0422 03/05/19  0346 03/02/19  0930   AST 11 16 26   ALT 21 23 36   ALKPHOS 100 102 87   BILITOTAL 0.8 1.0 0.9   ALBUMIN 3.2* 2.9* 2.8*     Pancreatic Enzymes  No lab results found in last 7 days.  Coagulation Profile  No lab results found in last 7 days.      5. RADIOLOGY:   Recent Results (from the past 24 hour(s))   XR Chest Port 1 View   Result Value    Radiologist flags [place] Liz catheter - recommend retraction (Urgent)    Narrative    EXAMINATION:  XR CHEST PORT 1 VW 3/5/2019 11:40 AM.    COMPARISON: Same day chest x-ray.    HISTORY:  eval swan position    FINDINGS: Single supine AP radiograph of the chest. Postoperative  changes of heart transplant with intact median sternotomy wires and  surgical clips. Left internal jugular approach (via persistent left  SVC) [place]-Liz catheter tip projecting over the distal right  interlobar artery. Interval removal of right internal jugular sheath.  Interval removal of mediastinal drains. Epicardial pacer wires.  Surgical clips projecting over the left axilla. Unchanged right and  left basilar chest tubes. Unchanged metallic foreign body projecting  over the right SVC.     Trachea is midline. Cardiac mediastinal silhouette is unchanged.  Unchanged diffuse interstitial opacities. No pleural effusion. Small  right apical pneumothorax. No left pneumothorax. No new focal  pulmonary opacity. Upper abdomen and bones are unremarkable.      Impression    IMPRESSION:   1. Left internal jugular approach (via persistent  left SVC) [place]-Liz  catheter tip projects over the right interlobar artery. Recommend  retraction.  2. Pulmonary venous congestion.    [Access Center: [place] Liz catheter - recommend retraction]    This report will be copied to the [facility] to ensure a  provider acknowledges the finding. Access Center is available Monday  through Friday 8am-3:30 pm.        I have personally reviewed the examination and initial interpretation  and I agree with the findings.    KISHA GUAMAN MD   XR Chest Port 1 View    Narrative    Exam: XR CHEST PORT 1 VW, 3/5/2019 5:33 PM    Indication: swan catheter repositioning    Comparison: Same-day chest radiograph    Findings:   AP view of the chest. Left IJ [place]-Liz catheter follows abnormal  course via presumed persistent left-sided SVC; tip now projects over  the central/mid right main pulmonary artery. Extensive postsurgical  changes of the chest. Intact median sternotomy wires. Bibasilar chest  tubes are stable.    Cardiomediastinal silhouette is within normal limits. No new focal  airspace consolidation, pleural effusion or pneumothorax. Prominent  interstitial opacities in both lungs similar to prior exam. Upper  abdomen is not well visualized. Left axillary clips.      Impression    Impression:   1. [place]-Liz catheter with the tip now projecting over the central/mid  right main pulmonary artery. Additional lines and tubes as above.  2. Diffuse interstitial opacities of both lungs are not significantly  changed. Findings consistent with pulmonary edema. No new focal  consolidation.    I have personally reviewed the examination and initial interpretation  and I agree with the findings.    MICHAEL HOLDER MD       =========================================

## 2019-03-07 ENCOUNTER — APPOINTMENT (OUTPATIENT)
Dept: PHYSICAL THERAPY | Facility: CLINIC | Age: 54
DRG: 001 | End: 2019-03-07
Attending: INTERNAL MEDICINE
Payer: COMMERCIAL

## 2019-03-07 ENCOUNTER — APPOINTMENT (OUTPATIENT)
Dept: GENERAL RADIOLOGY | Facility: CLINIC | Age: 54
DRG: 001 | End: 2019-03-07
Attending: PHYSICIAN ASSISTANT
Payer: COMMERCIAL

## 2019-03-07 ENCOUNTER — APPOINTMENT (OUTPATIENT)
Dept: ULTRASOUND IMAGING | Facility: CLINIC | Age: 54
DRG: 001 | End: 2019-03-07
Attending: INTERNAL MEDICINE
Payer: COMMERCIAL

## 2019-03-07 LAB
ALBUMIN SERPL-MCNC: 3.4 G/DL (ref 3.4–5)
ALP SERPL-CCNC: 98 U/L (ref 40–150)
ALT SERPL W P-5'-P-CCNC: 18 U/L (ref 0–70)
ANION GAP SERPL CALCULATED.3IONS-SCNC: 15 MMOL/L (ref 3–14)
ANION GAP SERPL CALCULATED.3IONS-SCNC: 18 MMOL/L (ref 3–14)
ANION GAP SERPL CALCULATED.3IONS-SCNC: 18 MMOL/L (ref 3–14)
ANION GAP SERPL CALCULATED.3IONS-SCNC: 19 MMOL/L (ref 3–14)
AST SERPL W P-5'-P-CCNC: 12 U/L (ref 0–45)
BASE DEFICIT BLDV-SCNC: 12.8 MMOL/L
BASE DEFICIT BLDV-SCNC: 8.5 MMOL/L
BASE DEFICIT BLDV-SCNC: 9.9 MMOL/L
BILIRUB SERPL-MCNC: 0.8 MG/DL (ref 0.2–1.3)
BUN SERPL-MCNC: 102 MG/DL (ref 7–30)
BUN SERPL-MCNC: 105 MG/DL (ref 7–30)
BUN SERPL-MCNC: 108 MG/DL (ref 7–30)
BUN SERPL-MCNC: 110 MG/DL (ref 7–30)
CALCIUM SERPL-MCNC: 7.7 MG/DL (ref 8.5–10.1)
CALCIUM SERPL-MCNC: 8 MG/DL (ref 8.5–10.1)
CALCIUM SERPL-MCNC: 8 MG/DL (ref 8.5–10.1)
CALCIUM SERPL-MCNC: 8.2 MG/DL (ref 8.5–10.1)
CHLORIDE SERPL-SCNC: 92 MMOL/L (ref 94–109)
CHLORIDE SERPL-SCNC: 92 MMOL/L (ref 94–109)
CHLORIDE SERPL-SCNC: 93 MMOL/L (ref 94–109)
CHLORIDE SERPL-SCNC: 96 MMOL/L (ref 94–109)
CO2 SERPL-SCNC: 14 MMOL/L (ref 20–32)
CO2 SERPL-SCNC: 15 MMOL/L (ref 20–32)
CO2 SERPL-SCNC: 16 MMOL/L (ref 20–32)
CO2 SERPL-SCNC: 18 MMOL/L (ref 20–32)
CREAT SERPL-MCNC: 3.9 MG/DL (ref 0.66–1.25)
CREAT SERPL-MCNC: 3.95 MG/DL (ref 0.66–1.25)
CREAT SERPL-MCNC: 4.01 MG/DL (ref 0.66–1.25)
CREAT SERPL-MCNC: 4.08 MG/DL (ref 0.66–1.25)
ERYTHROCYTE [DISTWIDTH] IN BLOOD BY AUTOMATED COUNT: 14.9 % (ref 10–15)
GFR SERPL CREATININE-BSD FRML MDRD: 15 ML/MIN/{1.73_M2}
GFR SERPL CREATININE-BSD FRML MDRD: 16 ML/MIN/{1.73_M2}
GLUCOSE BLDC GLUCOMTR-MCNC: 103 MG/DL (ref 70–99)
GLUCOSE BLDC GLUCOMTR-MCNC: 111 MG/DL (ref 70–99)
GLUCOSE BLDC GLUCOMTR-MCNC: 83 MG/DL (ref 70–99)
GLUCOSE BLDC GLUCOMTR-MCNC: 90 MG/DL (ref 70–99)
GLUCOSE SERPL-MCNC: 86 MG/DL (ref 70–99)
GLUCOSE SERPL-MCNC: 87 MG/DL (ref 70–99)
GLUCOSE SERPL-MCNC: 89 MG/DL (ref 70–99)
GLUCOSE SERPL-MCNC: 91 MG/DL (ref 70–99)
HCO3 BLDV-SCNC: 13 MMOL/L (ref 21–28)
HCO3 BLDV-SCNC: 16 MMOL/L (ref 21–28)
HCO3 BLDV-SCNC: 17 MMOL/L (ref 21–28)
HCT VFR BLD AUTO: 24.6 % (ref 40–53)
HGB BLD-MCNC: 7.9 G/DL (ref 13.3–17.7)
LMWH PPP CHRO-ACNC: 0.34 IU/ML
MAGNESIUM SERPL-MCNC: 3 MG/DL (ref 1.6–2.3)
MAGNESIUM SERPL-MCNC: 3 MG/DL (ref 1.6–2.3)
MCH RBC QN AUTO: 29.7 PG (ref 26.5–33)
MCHC RBC AUTO-ENTMCNC: 32.1 G/DL (ref 31.5–36.5)
MCV RBC AUTO: 93 FL (ref 78–100)
O2/TOTAL GAS SETTING VFR VENT: 21 %
OXYHGB MFR BLDV: 63 %
OXYHGB MFR BLDV: 65 %
OXYHGB MFR BLDV: 68 %
PCO2 BLDV: 27 MM HG (ref 40–50)
PCO2 BLDV: 33 MM HG (ref 40–50)
PCO2 BLDV: 36 MM HG (ref 40–50)
PH BLDV: 7.29 PH (ref 7.32–7.43)
PHOSPHATE SERPL-MCNC: 8.4 MG/DL (ref 2.5–4.5)
PLATELET # BLD AUTO: 217 10E9/L (ref 150–450)
PO2 BLDV: 38 MM HG (ref 25–47)
PO2 BLDV: 39 MM HG (ref 25–47)
PO2 BLDV: 41 MM HG (ref 25–47)
POTASSIUM SERPL-SCNC: 4.6 MMOL/L (ref 3.4–5.3)
POTASSIUM SERPL-SCNC: 4.8 MMOL/L (ref 3.4–5.3)
POTASSIUM SERPL-SCNC: 4.8 MMOL/L (ref 3.4–5.3)
POTASSIUM SERPL-SCNC: 4.9 MMOL/L (ref 3.4–5.3)
PROT SERPL-MCNC: 5.5 G/DL (ref 6.8–8.8)
RBC # BLD AUTO: 2.66 10E12/L (ref 4.4–5.9)
SODIUM SERPL-SCNC: 126 MMOL/L (ref 133–144)
SODIUM SERPL-SCNC: 126 MMOL/L (ref 133–144)
SODIUM SERPL-SCNC: 127 MMOL/L (ref 133–144)
SODIUM SERPL-SCNC: 129 MMOL/L (ref 133–144)
SODIUM UR-SCNC: 9 MMOL/L
TACROLIMUS BLD-MCNC: 10.6 UG/L (ref 5–15)
TME LAST DOSE: NORMAL H
WBC # BLD AUTO: 14.1 10E9/L (ref 4–11)

## 2019-03-07 PROCEDURE — 83735 ASSAY OF MAGNESIUM: CPT | Performed by: PHYSICIAN ASSISTANT

## 2019-03-07 PROCEDURE — 25000132 ZZH RX MED GY IP 250 OP 250 PS 637: Performed by: INTERNAL MEDICINE

## 2019-03-07 PROCEDURE — 97530 THERAPEUTIC ACTIVITIES: CPT | Mod: GP

## 2019-03-07 PROCEDURE — 25000128 H RX IP 250 OP 636: Performed by: STUDENT IN AN ORGANIZED HEALTH CARE EDUCATION/TRAINING PROGRAM

## 2019-03-07 PROCEDURE — 20000004 ZZH R&B ICU UMMC

## 2019-03-07 PROCEDURE — 99232 SBSQ HOSP IP/OBS MODERATE 35: CPT | Mod: GC | Performed by: ANESTHESIOLOGY

## 2019-03-07 PROCEDURE — 93975 VASCULAR STUDY: CPT | Mod: TC

## 2019-03-07 PROCEDURE — 80048 BASIC METABOLIC PNL TOTAL CA: CPT | Performed by: STUDENT IN AN ORGANIZED HEALTH CARE EDUCATION/TRAINING PROGRAM

## 2019-03-07 PROCEDURE — 85027 COMPLETE CBC AUTOMATED: CPT | Performed by: PHYSICIAN ASSISTANT

## 2019-03-07 PROCEDURE — 84300 ASSAY OF URINE SODIUM: CPT | Performed by: STUDENT IN AN ORGANIZED HEALTH CARE EDUCATION/TRAINING PROGRAM

## 2019-03-07 PROCEDURE — 25000132 ZZH RX MED GY IP 250 OP 250 PS 637: Performed by: STUDENT IN AN ORGANIZED HEALTH CARE EDUCATION/TRAINING PROGRAM

## 2019-03-07 PROCEDURE — 82805 BLOOD GASES W/O2 SATURATION: CPT | Performed by: PHYSICIAN ASSISTANT

## 2019-03-07 PROCEDURE — 97116 GAIT TRAINING THERAPY: CPT | Mod: GP

## 2019-03-07 PROCEDURE — 80053 COMPREHEN METABOLIC PANEL: CPT | Performed by: PHYSICIAN ASSISTANT

## 2019-03-07 PROCEDURE — 25000132 ZZH RX MED GY IP 250 OP 250 PS 637: Performed by: SURGERY

## 2019-03-07 PROCEDURE — 99291 CRITICAL CARE FIRST HOUR: CPT | Mod: GC | Performed by: INTERNAL MEDICINE

## 2019-03-07 PROCEDURE — 25000132 ZZH RX MED GY IP 250 OP 250 PS 637: Performed by: ANESTHESIOLOGY

## 2019-03-07 PROCEDURE — 00000146 ZZHCL STATISTIC GLUCOSE BY METER IP

## 2019-03-07 PROCEDURE — 25000131 ZZH RX MED GY IP 250 OP 636 PS 637: Performed by: STUDENT IN AN ORGANIZED HEALTH CARE EDUCATION/TRAINING PROGRAM

## 2019-03-07 PROCEDURE — 71045 X-RAY EXAM CHEST 1 VIEW: CPT

## 2019-03-07 PROCEDURE — 25000128 H RX IP 250 OP 636: Performed by: ANESTHESIOLOGY

## 2019-03-07 PROCEDURE — 84100 ASSAY OF PHOSPHORUS: CPT | Performed by: PHYSICIAN ASSISTANT

## 2019-03-07 PROCEDURE — 85520 HEPARIN ASSAY: CPT | Performed by: PHYSICIAN ASSISTANT

## 2019-03-07 PROCEDURE — 25000125 ZZHC RX 250: Performed by: INTERNAL MEDICINE

## 2019-03-07 PROCEDURE — 25000131 ZZH RX MED GY IP 250 OP 636 PS 637: Performed by: INTERNAL MEDICINE

## 2019-03-07 PROCEDURE — 80197 ASSAY OF TACROLIMUS: CPT | Performed by: INTERNAL MEDICINE

## 2019-03-07 PROCEDURE — 80048 BASIC METABOLIC PNL TOTAL CA: CPT | Performed by: PHYSICIAN ASSISTANT

## 2019-03-07 RX ORDER — NOREPINEPHRINE BITARTRATE/D5W 16MG/250ML
0.03-0.4 PLASTIC BAG, INJECTION (ML) INTRAVENOUS CONTINUOUS
Status: DISCONTINUED | OUTPATIENT
Start: 2019-03-07 | End: 2019-03-07

## 2019-03-07 RX ORDER — TAMSULOSIN HYDROCHLORIDE 0.4 MG/1
0.4 CAPSULE ORAL DAILY
Status: DISCONTINUED | OUTPATIENT
Start: 2019-03-07 | End: 2019-03-09

## 2019-03-07 RX ORDER — BUMETANIDE 0.25 MG/ML
4 INJECTION INTRAMUSCULAR; INTRAVENOUS ONCE
Status: COMPLETED | OUTPATIENT
Start: 2019-03-07 | End: 2019-03-07

## 2019-03-07 RX ADMIN — TAMSULOSIN HYDROCHLORIDE 0.4 MG: 0.4 CAPSULE ORAL at 14:27

## 2019-03-07 RX ADMIN — MELATONIN TAB 3 MG 3 MG: 3 TAB at 20:48

## 2019-03-07 RX ADMIN — BUMETANIDE 4 MG: 0.25 INJECTION INTRAMUSCULAR; INTRAVENOUS at 14:27

## 2019-03-07 RX ADMIN — ONDANSETRON 4 MG: 4 TABLET, ORALLY DISINTEGRATING ORAL at 22:42

## 2019-03-07 RX ADMIN — TERBUTALINE SULFATE 10 MG: 5 TABLET ORAL at 00:34

## 2019-03-07 RX ADMIN — OXYCODONE HYDROCHLORIDE 10 MG: 5 TABLET ORAL at 09:09

## 2019-03-07 RX ADMIN — FLUTICASONE PROPIONATE 2 PUFF: 220 AEROSOL, METERED RESPIRATORY (INHALATION) at 07:59

## 2019-03-07 RX ADMIN — SILDENAFIL 20 MG: 20 TABLET ORAL at 13:48

## 2019-03-07 RX ADMIN — MESALAMINE 2400 MG: 800 TABLET, DELAYED RELEASE ORAL at 20:48

## 2019-03-07 RX ADMIN — TACROLIMUS 1.5 MG: 1 CAPSULE ORAL at 07:43

## 2019-03-07 RX ADMIN — LEVOTHYROXINE SODIUM 100 MCG: 100 TABLET ORAL at 07:42

## 2019-03-07 RX ADMIN — Medication 500000 UNITS: at 07:46

## 2019-03-07 RX ADMIN — MYCOPHENOLATE MOFETIL 1500 MG: 500 TABLET ORAL at 07:47

## 2019-03-07 RX ADMIN — Medication 500000 UNITS: at 16:49

## 2019-03-07 RX ADMIN — TERBUTALINE SULFATE 10 MG: 5 TABLET ORAL at 16:50

## 2019-03-07 RX ADMIN — PREDNISONE 20 MG: 10 TABLET ORAL at 07:42

## 2019-03-07 RX ADMIN — HEPARIN SODIUM 950 UNITS/HR: 10000 INJECTION, SOLUTION INTRAVENOUS at 05:58

## 2019-03-07 RX ADMIN — MESALAMINE 2400 MG: 800 TABLET, DELAYED RELEASE ORAL at 07:46

## 2019-03-07 RX ADMIN — PANTOPRAZOLE SODIUM 40 MG: 40 TABLET, DELAYED RELEASE ORAL at 07:42

## 2019-03-07 RX ADMIN — TACROLIMUS 2 MG: 1 CAPSULE ORAL at 17:27

## 2019-03-07 RX ADMIN — MONTELUKAST SODIUM 10 MG: 10 TABLET, COATED ORAL at 22:39

## 2019-03-07 RX ADMIN — Medication 500000 UNITS: at 20:51

## 2019-03-07 RX ADMIN — MYCOPHENOLATE MOFETIL 1500 MG: 500 TABLET ORAL at 17:28

## 2019-03-07 RX ADMIN — SILDENAFIL 20 MG: 20 TABLET ORAL at 20:48

## 2019-03-07 RX ADMIN — ROSUVASTATIN CALCIUM 10 MG: 10 TABLET, FILM COATED ORAL at 07:42

## 2019-03-07 RX ADMIN — ASPIRIN 81 MG: 81 TABLET, COATED ORAL at 07:42

## 2019-03-07 RX ADMIN — SILDENAFIL 20 MG: 20 TABLET ORAL at 07:42

## 2019-03-07 RX ADMIN — PREDNISONE 20 MG: 10 TABLET ORAL at 17:28

## 2019-03-07 RX ADMIN — TERBUTALINE SULFATE 10 MG: 5 TABLET ORAL at 07:44

## 2019-03-07 RX ADMIN — OXYCODONE HYDROCHLORIDE 10 MG: 5 TABLET ORAL at 17:33

## 2019-03-07 RX ADMIN — Medication 500000 UNITS: at 11:38

## 2019-03-07 RX ADMIN — CHLOROTHIAZIDE 1000 MG: 250 SUSPENSION ORAL at 23:30

## 2019-03-07 RX ADMIN — DULOXETINE 20 MG: 20 CAPSULE, DELAYED RELEASE ORAL at 07:42

## 2019-03-07 RX ADMIN — FLUTICASONE PROPIONATE 2 PUFF: 220 AEROSOL, METERED RESPIRATORY (INHALATION) at 20:51

## 2019-03-07 ASSESSMENT — ACTIVITIES OF DAILY LIVING (ADL)
ADLS_ACUITY_SCORE: 11

## 2019-03-07 NOTE — PROGRESS NOTES
CV ICU PROGRESS NOTE  March 7, 2019      CO-MORBIDITIES:   Chronic systolic heart failure (H)  (primary encounter diagnosis)  Acute on chronic systolic congestive heart failure (H)    ASSESSMENT: Everton Larios is a 53 year old male with PMH etoh abuse, hypothyroidism, intermittent asthma, ulcerative colitis, Depression, Chronic HFrEF, NICM admitted with cardiogenic shock requiring subclavian balloon pump now s/p OHT 2/24/19 with [facility] and Christopher.      TODAY'S PROGRESS:   - Continue dobutamine 2.5   - Monitor hyponatremia q6h   - Follow up urine sodium level  - Appreciate nephrology recs re diuresis vs UF  - Change PPI to H2 blocker    PLAN:  Neuro/ pain/ sedation:  - Monitor neurological status. Notify the MD for any acute changes in exam.  - Tylenol, oxycodone and PRN fentanyl     #Depression  - PTA cymbalta 20mg  - Melatonin for sleep     Pulmonary care:   - Doing well on room air  - Supp oxygen to maintain SpO2 >92%  - Encourage IS and deep breathing    #Intermittent asthma  - PTA Montelukast ordered    Cardiovascular: HR goal >95. Echo 3/5 with good LV function, mild decreased RV function (moderately dilated).  - Monitor hemodynamic status.   - MAP >65. HR goal > 95.  - Dobutamine 2.5 mcg/kg/min for RV support  - Sildenafil 20 q8h   - Terbutaline 10 mg q8h  - Aspirin 81 mg. Statin.     GI care:   - Regular diet  - Senna, miralax and suppository for bowel regimen; MoM and miralax prn - hold for loose stools    #Intraperitoneal free air (CT obtained 2/26)   - Small peritoneal defect made during redo operation, closed with stitches intra-op- likely source of free air. Resolved.    #Pierce's esophagus  #Ulcerative colitis  -PTA Mesalamine     Fluids/ Electrolytes/ Nutrition:   - TKO for IV fluid hydration  - No indication for parenteral nutrition.  - Replace electrolytes as needed.    Renal/ Fluid Balance: FRANKLIN, oliguric. Cr up to 4.01 today.   - Etiology potential cardiorenal from venous congestion vs  nephrotoxicity from multiple medications  - Renal ultrasound with dopplers completed; urine Na pending; appreciate nephrology recs re diuresis vs ultrafiltration  - Will continue to monitor intake and output.  - PTA Tamsulosin      Endocrine:    # Stress hyperglycemia  - sliding scale insulin as needed  # hypothyroidism  - Home levothyroxine      ID/ Antibiotics:  #Leukocytosis  - Pan cultured 3/4 for leukocytosis- NGTD. Afebrile. No active concern for infection, no antibiotics.  - Perioperative antibiotics vanc/zosyn x 3 days- completed.  - Valcyte for CMV prophylaxis; holding bactrim for PCP prophylaxis.  - Pentamidine given on 3/4.   - Nystatin for thrush     Heme:     #Bypass of persistent left SVC to R atrial appendage with [place]-Davis graft. Will require lifelong anti-coagulation for graft  - Hgb goal >7. Daily CBC.  - Low intensity heparin gtt    Prophylaxis:    - Mechanical prophylaxis for DVT  - Heparin as above  - Protonix qd     Lines/ tubes/ drains:  - RIJ, [place], Arterial line, L pleural chest tubes x2      Disposition:  - CV ICU.     Patient seen, findings and plan discussed with CV ICU staff, Dr. Henderson.    Leyla Bowling MD  General Surgery PGY-3  ====================================    SUBJECTIVE:   No acute events overnight  UOP still low  Denies pain   Poor appetite    OBJECTIVE:   1. VITAL SIGNS:   Temp:  [96.1  F (35.6  C)-97.5  F (36.4  C)] (P) 96.1  F (35.6  C)  Pulse:  [89-93] 89  Heart Rate:  [89-93] 89  Resp:  [8-30] 11  BP: (102-117)/(56-68) 104/62  SpO2:  [92 %-99 %] 97 %  Resp: 11      2. INTAKE/ OUTPUT:   I/O last 3 completed shifts:  In: 1802.31 [P.O.:650; I.V.:402.31; IV Piggyback:250]  Out: 985 [Urine:230; Other:175; Chest Tube:580]    3. PHYSICAL EXAMINATION:     General: very pleasant, appropriately interactive   Neuro: Alert and oriented by 3, no focal deficits  Resp: CTAB on room air  CV: Regular rate, regular rhythm, serosanguinous output from L pleural chest tube, trace pedal  edema  Abdomen: Soft, Non-distended, non-tender  Incisions: c/d/i  Extremities: warm and well perfused    4. INVESTIGATIONS:   Arterial Blood Gases   No lab results found in last 7 days.  Complete Blood Count   Recent Labs   Lab 03/07/19  0351 03/06/19  1536 03/06/19  0422 03/05/19  1547   WBC 14.1* 13.8* 16.0* 21.0*   HGB 7.9* 7.9* 8.3* 8.3*    204 206 203     Basic Metabolic Panel  Recent Labs   Lab 03/07/19  0910 03/07/19  0351 03/06/19  2154 03/06/19  1536   * 127* 126* 128*   POTASSIUM 4.8 4.8 4.7 4.6   CHLORIDE 92* 92* 94 94   CO2 15* 16* 16* 18*   * 102* 99* 93*   CR 4.01* 3.90* 3.48* 3.27*   GLC 89 91 99 95     Liver Function Tests  Recent Labs   Lab 03/07/19  0351 03/06/19  0422 03/05/19  0346 03/02/19  0930   AST 12 11 16 26   ALT 18 21 23 36   ALKPHOS 98 100 102 87   BILITOTAL 0.8 0.8 1.0 0.9   ALBUMIN 3.4 3.2* 2.9* 2.8*     Pancreatic Enzymes  No lab results found in last 7 days.  Coagulation Profile  No lab results found in last 7 days.      5. RADIOLOGY:   Recent Results (from the past 24 hour(s))   XR Chest Port 1 View    Narrative    EXAM: XR CHEST PORT 1 VW  3/7/2019 2:18 AM      HISTORY: Lines and tubes.    COMPARISON: Radiograph 3/6/2018    FINDINGS: AP radiograph of the chest. Postsurgical changes of a heart  transplant. Left IJ [place]-Liz catheter tip projects over the right  pulmonary artery. Bilateral chest tubes are stable. Intact sternotomy  wires. Surgical clips project over the mediastinum and left axilla.  Retained lead fragment in the mid SVC.    Trace right apical pneumothorax, decreased in size. No left  pneumothorax. Mild interstitial opacities. Cardiac silhouette is  unchanged. New right lower lobe hazy airspace opacity. Stable  retrocardiac opacity.      Impression    IMPRESSION:   1. [place]-Liz catheter tip projects over the right pulmonary artery.  2. New hazy right lower lobe airspace opacities, which may represent  atelectasis or infection.  3. Mild edema,  stable.  4. Stable retrocardiac opacity, likely atelectasis.    I have personally reviewed the examination and initial interpretation  and I agree with the findings.    RADHA ALY MD   US Renal Complete w Duplex Complete    Narrative    Exam: Duplex Doppler ultrasound of the kidneys and bilateral renal  arteries dated 3/7/2019 9:27 AM    Comparison Study: 11/17/2018    Clinical Information: Oliguric renal failure after heart transplant    Technique: B-mode (grayscale) and duplex Doppler evaluation of the  abdominal aorta and renal arteries performed. Velocity measurements  obtained with angle correction at or less than 60 degrees.    Technically difficult examination. Patient could not hold his breath.  Overall difficult visualization.    Findings:    The right kidney measures 11.0 cm. The left kidney measures 10.3 cm.  Cortical thickness and echogenicity is normal. Scarring in the left  renal cortex. No evidence of stones, masses or hydronephrosis.    Peak systolic velocities in the abdominal aorta are:     108 cm/sec in the suprarenal aorta.   75 cm/sec in the infrarenal aorta.    Right renal artery:    100 cm/sec at the origin, 59 cm/sec in the mid artery, and 43 cm/sec  at the hilum with resistive indices at 1.0 in all segments of the  renal artery.    Unable to Doppler arcuate arteries.    To-and-fro flow in the renal veins.    Left renal artery:    Nonvisualized renal artery at the origin and mid renal artery.  43  cm/sec at the hilum with resistive indices at 1.0.    Unable to Doppler arcuate arteries.    To-and-fro flow in the renal veins.    Moderate ascites.      Impression    Impression:    1. Technically difficult study. There is no diastolic flow in the  renal arteries bilaterally with resistive indices of 1.0, with to and  fro flow in the renal veins. Unable to Doppler arcuate arteries.  Findings are likely secondary to venous congestion, with differential  including right heart failure.    2.  Moderate ascites.    3. Scarring in the left renal cortex.    I have personally reviewed the examination and initial interpretation  and I agree with the findings.    LOLA BARROW MD       =========================================

## 2019-03-07 NOTE — PLAN OF CARE
D/I/A:  Neuro: Patient is alert and oriented, calm and cooperative.  Denies pain, moves all extremities, pupils equal and reactive.  CV:   Blood pressures WNL.  SR, no ectopy noted.  Afebrile, pulses 2+, trace edema throughout.  CVP's 17-20, PA pressures 30's over teens, CO 5.3, CI 3.  Temporary pacer capped.  Pulm:  Room air, lungs clear over diminished bases.  2 pleural tubes y'd to suction ~200 serosanguineous output overnight.  GI:  NPO since MN for renal ultrasound this morning.  Poor appetite.  Bowel sounds audible.  :  Used commode for total of 55mL output overnight.  Creatinine rising; 3.8 this morning.    Lines: Left internal jugular swan, left piv.  Gtts: Heparin and dobutamine.  Labs:  Na 129, Creatinine 3.8.      P: Continue to monitor patient closely; update team with any changes or concerns.    Please see flow sheets for further information.

## 2019-03-07 NOTE — PROGRESS NOTES
Nephrology Progress Note  March 7, 2019      Everton Larios MRN:[id number] YOB: 1965  Date of Admission:2/17/2019  Primary care provider: Fredrick Wolff  Requesting physician: Yves Rodrigues MD    ASSESSMENT AND RECOMMENDATIONS:   Mr. Larios is a 53 year old male with history of chronic renal insufficiency, EtOH abuse, GIB w/splenic embolization, PAF s/p ablation and cardioversion, NICM c/b cardiogenic shock requiring IABP, s/p OHT (2/24/19) on tacrolimus and MMF, complicated by RV dysfunction per echo and acute oliguric kidney injury.         #Acute oliguric kidney injury  Patient with history of chronic renal insufficiency and cortical scarring of the L kidney per Renal US 03/2016. Baseline Crt 1.1- 1.4 over the last 6 months; however, prior BMPs have demonstrated patient Crt as high as 2.0. He most likely has a baseline level of CKD due to renal artery stenosis (longstanding smoking history, abdominal bruit on physical examination) and possible smaller FRANKLIN episodes from HFrEF exacerbations. Current rise in creatinine started 3/1 at 1.89, and increasing to 4.01 3/7 and oliguria is most likely due to nephrotoxicity associated with tacrolimus (a renal vasoconstrictor), and diuretic use. BUN continues to rise to 101, but no evidence of uremic symptoms on physical exam. The etiology of patient's creatinine rise is multifactorial in nature; however, there is mention in the transplant literature of the interaction between tacrolimus and valganciclovir leading to intra tubular obstruction. The use of PPIs alongside tacrolimus can lead to elevated tacro levels, with omeprazole being the worst offender of the class (Pharmacokinetics and Toxicity of Tacrolimus Early After Heart and Lung Transplantation PARVEZ Keith. Am J Transplant. 2015 Sep;15(9):2301-13. doi: 10.1111/ajt.36760. Epub  2015 Jun 4.).    Renal US notable for congestion of the renal veins bilaterally, suggesting right sided heart failure. No evidence of nephrolithiasis or hydronephrosis. Random urine sodium 9, suggestive of hypovolemic hyponatremia most likely in the setting of third spacing from RV dysfunction.     Immunosuppressants:  MMF (2/27 - date)  Tacrolimus (2/28-date)     Last tacrolimus level: 11.7 (3/5/19)     Diuretics:  Bumex gtt(3/2-3/3)  Lasix gtt (3/3-3/4)     #Electrolytes  Moderate Hyponatremia  Patient's decreasing sodium trend began 2/27, with 1-2 point drop daily and reaching its fam of 126 on 3/6. Etiologies to explain this include diuretic use and the use of nephrotoxic agents. Currently not displaying any neurological symptoms to require immediate sodium repletion at this time.     #Acid base status  Patient's acid base trend shows decreasing bicarb trend coinciding with the onset of FRANKLIN. Will continue to monitor closely alongside of the creatinine trend.     #Volume status  Patient appears clinically euvolemic, yet hemodynamics are indicative of elevated right sided filling pressures consistent with RV dysfunction and moderate tricuspid regurgitation as seen on the recent TTE. PCW ranges from 16-20; current CVP is 20 (3/6/19).      #Acute normocytic anemia  Patient's hemoglobin displays a gradual downward trend since postop day 1, where Hgb was 12.5 and is now 7.9. Unclear the etiology for the gradually decreasing hemoglobin trend, but will follow closely with daily CBC.     Recommendations  1. Start Flomax 0.4 mg daily due to h/o urinary outlet obstruction that may contribute to decreased UOP.  2 Agree with giving Bumex 4 mg IV for diuresis.     Recommendations were verbally communicated to primary team.     Seen and discussed with Dr. Paul.     Yasmany Witt MD   Internal Medicine, PGY-1  [phone]    SUBJECTIVE:  Nursing notes reviewed. Patient reports worsening fatigue, ongoing shortness of  breath but still able to complete PT's exercises. He notes that whenever he voids, he still feels like he has some urine retained. Patient admits to episodes of stranguria and intermittent nocturia. BM x 1 overnight that was normal in appearance and size. No fevers, chills, CP, cough, n/v/d/c, worsening leg swelling, new rash, HA, numbness or tingling.    PAST MEDICAL HISTORY:  Reviewed with patient on 03/07/2019     Past Medical History:   Diagnosis Date     Alcohol abuse      Pierce's esophagus 10/4/2018     Chronic rhinitis 10/4/2018     Chronic systolic heart failure (H) 10/4/2018     Depression 10/4/2018     Diastolic dysfunction      DJD (degenerative joint disease) - neck 10/4/2018     H/O congenital atrial septal defect (ASD) repair at age 3 10/4/2018     Hypothyroidism due to medication 10/4/2018     ICD, [employer] 2008; gen change 2/2018 10/4/2018     Intermittent asthma without complication 10/4/2018     Nonischemic cardiomyopathy (H) 10/4/2018     On amiodarone therapy 10/4/2018     Paroxysmal atrial fibrillation (H) 10/4/2018     S/P ablation of atrial fibrillation 10/4/2018     Systolic heart failure (H)      Ulcerative colitis (H) 10/4/2018     Ventricular tachycardia (H) 10/4/2018       Past Surgical History:   Procedure Laterality Date     AICD, DUAL CHAMBER       INSERT INTRAAORTIC BALLOON PUMP Left 2/19/2019    Procedure: Insert left  Subclavian Balloon Pump,  Removal Right femoral arterial balloom pump sheath;  Surgeon: Dewayne House MD;  Location: [facility] OR     INSERT INTRAAORTIC BALLOON PUMP Left 2/21/2019    Procedure: SUBCLAVIAN BALLOON PUMP PLACEMENT;  Surgeon: Ben White MD;  Location: [facility] OR     TRANSPLANT HEART RECIPIENT N/A 2/24/2019    Procedure: TRANSPLANT HEART RECIPIENT;  Surgeon: Dewayne House MD;  Location: [facility] OR        MEDICATIONS:  PTA Meds  Prior to Admission medications    Medication Sig Last Dose Taking? Auth Provider   albuterol (PROAIR  HFA/PROVENTIL HFA/VENTOLIN HFA) 108 (90 Base) MCG/ACT inhaler Inhale 2 puffs into the lungs every 6 hours as needed for shortness of breath / dyspnea or wheezing Past Month at Unknown time Yes    Alpha-D-Galactosidase (BEANO PO) Take 2 tablets by mouth 3 times daily  Past Month at Unknown time Yes Reported, Patient   amiodarone (PACERONE/CODARONE) 200 MG tablet Take 1 tablet (200 mg) by mouth daily Past Month at Unknown time Yes    DULoxetine (CYMBALTA) 20 MG EC capsule Take 20 mg by mouth daily 3/1/2019 at Unknown time Yes Unknown, Entered By History   Ergocalciferol (VITAMIN D2 PO)  Past Month at Unknown time Yes Unknown, Entered By History   fluticasone (FLOVENT HFA) 220 MCG/ACT Inhaler Inhale 2 puffs into the lungs 2 times daily 3/1/2019 at Unknown time Yes Unknown, Entered By History   furosemide (LASIX) 20 MG tablet Take 2 tablets (40 mg) by mouth 2 times daily 2/28/2019 at Unknown time Yes Corinna Donis MD   levothyroxine (SYNTHROID/LEVOTHROID) 100 MCG tablet Take 100 mcg by mouth daily  3/1/2019 at Unknown time Yes Unknown, Entered By History   mesalamine (ASACOL HD) 800 MG EC tablet Take 3 tablets (2,400 mg) by mouth 2 times daily 3/1/2019 at Unknown time Yes    milrinone (PRIMACOR) infusion 200 mcg/mL PREMIX Inject 6.6375 mcg/min into the vein continuous Past Month at Unknown time Yes Luis Carlos Templeton MD   montelukast (SINGULAIR) 10 MG tablet Take 1 tablet (10 mg) by mouth At Bedtime 3/1/2019 at Unknown time Yes    omeprazole (PRILOSEC) 40 MG capsule Take 1 capsule (40 mg) by mouth daily In pm Past Month at Unknown time Yes    ondansetron (ZOFRAN-ODT) 4 MG ODT tab Take 1 tablet (4 mg) by mouth every 8 hours as needed for nausea Past Week at Unknown time Yes    potassium chloride ER (K-DUR/KLOR-CON M) 20 MEQ CR tablet Take 40 mg in the am and 20 mg in the afternoon. 2/28/2019 at Unknown time Yes My Norton, NP   spironolactone (ALDACTONE) 25 MG tablet Take 25 mg by mouth daily Past Month at  Unknown time Yes Unknown, Entered By History   tamsulosin (FLOMAX) 0.4 MG capsule Take 1 capsule (0.4 mg) by mouth daily (with dinner) Past Month at Unknown time Yes    warfarin (COUMADIN) 5 MG tablet Take 2.5 mg on Sun/Thurs and 5 mg on all other days of the week Past Month at Unknown time Yes    BETA BLOCKER NOT PRESCRIBED, INTENTIONAL, Beta Blocker not prescribed intentionally due to Recent tx with IV positive inotropic agent  Patient not taking: Reported on 1/30/2019   Luis Carlos Templeton MD      Current Meds    aspirin  81 mg Oral Daily     DULoxetine  20 mg Oral Daily     fluticasone  2 puff Inhalation Q12H     heparin lock flush  5 mL Intravenous Q24H     insulin aspart  1-7 Units Subcutaneous TID AC     insulin aspart  1-5 Units Subcutaneous At Bedtime     levothyroxine  100 mcg Oral Daily     lidocaine  1 patch Transdermal Q24H     lidocaine   Transdermal Q8H     lidocaine   Transdermal Q24h     melatonin  3 mg Oral At Bedtime     mesalamine  2,400 mg Oral BID     montelukast  10 mg Oral At Bedtime     mycophenolate  1,500 mg Oral BID IS     nystatin  500,000 Units Oral 4x Daily     pantoprazole  40 mg Oral QAM AC     polyethylene glycol  17 g Oral Daily     predniSONE  20 mg Oral BID w/meals     rosuvastatin  10 mg Oral Daily     senna-docusate  1 tablet Oral BID    Or     senna-docusate  2 tablet Oral BID     sildenafil  20 mg Oral TID     sodium chloride (PF)  3 mL Intracatheter Q8H     tacrolimus  1.5 mg Oral QAM     tacrolimus  2 mg Oral QPM     terbutaline  10 mg Oral Q8H     [START ON 3/9/2019] valGANciclovir  450 mg Oral Once per day on Tue Sat     Infusion Meds    DOBUTamine 2.5 mcg/kg/min (03/07/19 0800)     HEParin 950 Units/hr (03/07/19 0800)     - MEDICATION INSTRUCTIONS -       - MEDICATION INSTRUCTIONS -       BETA BLOCKER NOT PRESCRIBED         ALLERGIES:    Allergies   Allergen Reactions     Hydromorphone Other (See Comments)     Significant Delirium     Lisinopril Other (See Comments)      hypotension     Codeine Nausea       REVIEW OF SYSTEMS:  A comprehensive of systems was negative except as noted above.    SOCIAL HISTORY:   Social History     Socioeconomic History     Marital status: Single     Spouse name: Not on file     Number of children: Not on file     Years of education: Not on file     Highest education level: Not on file   Occupational History     Not on file   Social Needs     Financial resource strain: Not on file     Food insecurity:     Worry: Not on file     Inability: Not on file     Transportation needs:     Medical: Not on file     Non-medical: Not on file   Tobacco Use     Smoking status: Former Smoker     Years: 10.00     Smokeless tobacco: Never Used   Substance and Sexual Activity     Alcohol use: Yes     Alcohol/week: 0.6 oz     Types: 1 Cans of beer per week     Comment: a couple times a week      Drug use: No     Sexual activity: Not on file   Lifestyle     Physical activity:     Days per week: Not on file     Minutes per session: Not on file     Stress: Not on file   Relationships     Social connections:     Talks on phone: Not on file     Gets together: Not on file     Attends [employer] service: Not on file     Active member of club or organization: Not on file     Attends meetings of clubs or organizations: Not on file     Relationship status: Not on file     Intimate partner violence:     Fear of current or ex partner: Not on file     Emotionally abused: Not on file     Physically abused: Not on file     Forced sexual activity: Not on file   Other Topics Concern     Not on file   Social History Narrative     Not on file       FAMILY MEDICAL HISTORY:   No family history on file.      PHYSICAL EXAM:   Temp  Av.9  F (36.6  C)  Min: 95.9  F (35.5  C)  Max: 100  F (37.8  C)  Arterial Line BP  Min: 68/56  Max: 182/36  Arterial Line BP 2  Min: 90/54  Max: 107/56  Arterial Line MAP (mmHg)  Av.7 mmHg  Min: 57 mmHg  Max: 123 mmHg Arterial Line Location 2: Right femoral   "  Pulse  Av.5  Min: 59  Max: 140 Resp  Av.1  Min: 8  Max: 45  FiO2 (%)  Av %  Min: 40 %  Max: 100 %  SpO2  Av.1 %  Min: 88 %  Max: 100 %    CVP (mmHg): 21 mmHg  /66   Pulse 90   Temp 96.2  F (35.7  C) (Axillary)   Resp 29   Ht 1.727 m (5' 8\")   Wt 62.1 kg (136 lb 14.5 oz)   SpO2 99%   BMI 20.82 kg/m     Date 19 07 - 19 0659   Shift 0295-7459 5967-0570 2198-4427 24 Hour Total   INTAKE   P.O. 100   100   I.V. 70.4   70.4   Shift Total(mL/kg) 170.4(2.74)   170.4(2.74)   OUTPUT   Chest Tube(mL/kg) 238(3.83)   238(3.83)   Shift Total(mL/kg) 238(3.83)   238(3.83)   Weight (kg) 62.1 62.1 62.1 62.1      Admit Weight: 63.5 kg (140 lb 1.6 oz)     GENERAL APPEARANCE: No acute distress, awake and conversing normally.  EYES: No scleral icterus, pupils equal  Lymphatics: no cervical or supraclavicular LAD  Pulmonary: lungs clear to auscultation with equal breath sounds bilaterally, speaking in full sentences  CV: regular rhythm, normal rate, no rub   - JVD: Elevated to 18 cm.   - Edema: 1+ pedal edema up to the knees bilaterally  GI: soft, nontender, normal bowel sounds. Bilateral abdominal bruit on auscultation  MS: no evidence of inflammation in joints, no muscle tenderness  SKIN: no rash, warm, dry, no cyanosis  NEURO: face symmetric,  no asterixis     LABS:   CMP  Recent Labs   Lab 19  0351 19  2154 19  1536 19  1248 19  0422 19  1547 19  0346  19  0412  19  0930   * 126* 128* 127* 126* 130* 127*   < > 130*   < > 128*   POTASSIUM 4.8 4.7 4.6 4.3 4.6 4.0 4.2   < > 4.2   < > 4.2   CHLORIDE 92* 94 94 93* 93* 97 94   < > 96   < > 94   CO2 16* 16* 18* 17* 17* 19* 19*   < > 18*   < > 21   ANIONGAP 18* 16* 15* 17* 17* 14 14   < > 16*   < > 12   GLC 91 99 95 98 102* 89 107*   < > 108*   < > 160*   * 99* 93* 90* 97* 86* 91*   < > 79*   < > 69*   CR 3.90* 3.48* 3.27* 3.06* 3.25* 2.82* 2.83*   < > 2.63*   < > 2.09* "   GFRESTIMATED 16* 19* 20* 22* 20* 24* 24*   < > 26*   < > 34*   GFRESTBLACK 19* 22* 23* 25* 23* 28* 28*   < > 30*   < > 40*   RADAMES 8.0* 7.6* 7.5* 7.2* 7.9* 7.3* 7.9*   < > 7.8*   < > 7.8*   MAG 3.0*  --  2.7*  --  3.1* 2.8* 2.9*   < > 2.7*   < >  --    PHOS 8.4*  --   --   --  7.7*  --  7.1*  --  6.7*   < >  --    PROTTOTAL 5.5*  --   --   --  5.6*  --  5.6*  --   --   --  5.6*   ALBUMIN 3.4  --   --   --  3.2*  --  2.9*  --   --   --  2.8*   BILITOTAL 0.8  --   --   --  0.8  --  1.0  --   --   --  0.9   ALKPHOS 98  --   --   --  100  --  102  --   --   --  87   AST 12  --   --   --  11  --  16  --   --   --  26   ALT 18  --   --   --  21  --  23  --   --   --  36    < > = values in this interval not displayed.     CBC  Recent Labs   Lab 03/07/19 0351 03/06/19  1536 03/06/19  0422 03/05/19  1547   HGB 7.9* 7.9* 8.3* 8.3*   WBC 14.1* 13.8* 16.0* 21.0*   RBC 2.66* 2.67* 2.78* 2.77*   HCT 24.6* 24.8* 25.7* 25.4*   MCV 93 93 92 92   MCH 29.7 29.6 29.9 30.0   MCHC 32.1 31.9 32.3 32.7   RDW 14.9 14.8 14.8 14.7    204 206 203     INRNo lab results found in last 7 days.  ABG  Recent Labs   Lab 03/07/19 0351 03/06/19  2154 03/06/19  1536 03/06/19  1120   O2PER 21.0 21.0 21.0 21.0      URINE STUDIES  Recent Labs   Lab Test 03/04/19  1303 02/23/19  1921 02/18/19  0957 11/15/18  1000   COLOR Yellow Yellow Yellow Yellow   APPEARANCE Clear Clear Clear Clear   URINEGLC Negative Negative Negative Negative   URINEBILI Negative Negative Negative Negative   URINEKETONE Negative Negative Negative Negative   SG 1.014 1.008 1.008 1.015   UBLD Negative Negative Negative Negative   URINEPH 5.0 6.0 5.5 5.0   PROTEIN Negative Negative Negative Negative   NITRITE Negative Negative Negative Negative   LEUKEST Negative Negative Negative Negative   RBCU 0 <1 <1 <1   WBCU 0 <1 1 1     No lab results found.  PTH  No lab results found.  IRON STUDIES  Recent Labs   Lab Test 11/15/18  0955 10/11/18  1235   IRON  --  61   FEB  --  457*    ARYA  --  14*   NATE 1,045*  --        IMAGING:  Renal Ultrasound w/dopplers 3/7/19:                                                                   Impression:     1. Technically difficult study. There is no diastolic flow in the  renal arteries bilaterally with resistive indices of 1.0, with to and  fro flow in the renal veins. Unable to Doppler arcuate arteries.  Findings are likely secondary to venous congestion, with differential  including right heart failure.     2. Moderate ascites.     3. Scarring in the left renal cortex.    I, Abhijeet Paul, saw this patient on 03/07/2019 with Dr. Witt and agree with the findings and plan of care as documented in the note.

## 2019-03-07 NOTE — PLAN OF CARE
Discharge Planner PT   Patient plan for discharge: Rehab  Current status: Pt very fatigued today but willing to try some therapy.  Pt Joelle-CGA for bed mobility with us of heart pillow, some SOB noted after transition.  Pt ambulated 50' x 2 with use of FWW and CGA, on RA but very fatigued, increased RR needing several standing and 1 seated rest break.  Continue to encourage pt being upright in chair.   Barriers to return to prior living situation: Need for increased assist with transfers, ambulation and stairs, decreased overall strength and activity tolerance  Recommendations for discharge: ARU  Rationale for recommendations: To progress strength and IND with mobility       Entered by: Maureen Jackson 03/07/2019 12:09 PM

## 2019-03-07 NOTE — PROGRESS NOTES
CVTS PROGRESS NOTE  March 7, 2019      CO-MORBIDITIES:   Chronic systolic heart failure (H)  (primary encounter diagnosis)  Acute on chronic systolic congestive heart failure (H)    ASSESSMENT: Everton Larios is a 53 year old male with PMH etoh abuse, hypothyroidism, intermittent asthma, ulcerative colitis, Depression, Chronic HFrEF, NICM admitted with cardiogenic shock requiring subclavian balloon pump now s/p OHT 2/24/19 with Piyush House and Christopher.      TODAY'S PROGRESS:   - Continue dobutamine 2.5   - Monitor hyponatremia q6h   - Follow up urine sodium level  - Appreciate nephrology recs re diuresis vs UF  - Change PPI to H2 blocker    PLAN:  Neuro/ pain/ sedation:  - Monitor neurological status. Notify the MD for any acute changes in exam.  - Tylenol, oxycodone and PRN fentanyl     #Depression  - PTA cymbalta 20mg  - Melatonin for sleep     Pulmonary care:   - Doing well on room air  - Supp oxygen to maintain SpO2 >92%  - Encourage IS and deep breathing    #Intermittent asthma  - PTA Montelukast ordered    Cardiovascular: HR goal >95. Echo 3/5 with good LV function, mild decreased RV function (moderately dilated).  - Monitor hemodynamic status.   - MAP >65. HR goal > 95.  - Dobutamine 2.5 mcg/kg/min for RV support  - Sildenafil 20 q8h   - Terbutaline 10 mg q8h  - Aspirin 81 mg. Statin.     GI care:   - Regular diet  - Senna, miralax and suppository for bowel regimen; MoM and miralax prn - hold for loose stools    #Intraperitoneal free air (CT obtained 2/26)   - Small peritoneal defect made during redo operation, closed with stitches intra-op- likely source of free air. Resolved.    #Pierce's esophagus  #Ulcerative colitis  -PTA Mesalamine     Fluids/ Electrolytes/ Nutrition:   - TKO for IV fluid hydration  - No indication for parenteral nutrition.  - Replace electrolytes as needed.    Renal/ Fluid Balance: FRANKLIN, oliguric. Cr up to 4.01 today.   - Etiology potential cardiorenal from venous congestion vs  nephrotoxicity from multiple medications  - Renal ultrasound with dopplers completed; urine Na pending; appreciate nephrology recs re diuresis vs ultrafiltration  - Will continue to monitor intake and output.  - PTA Tamsulosin      Endocrine:    # Stress hyperglycemia  - sliding scale insulin as needed  # hypothyroidism  - Home levothyroxine      ID/ Antibiotics:  #Leukocytosis  - Pan cultured 3/4 for leukocytosis- NGTD. Afebrile. No active concern for infection, no antibiotics.  - Perioperative antibiotics vanc/zosyn x 3 days- completed.  - Valcyte for CMV prophylaxis; holding bactrim for PCP prophylaxis.  - Pentamidine given on 3/4.   - Nystatin for thrush     Heme:     #Bypass of persistent left SVC to R atrial appendage with [place]-Davis graft. Will require lifelong anti-coagulation for graft  - Hgb goal >7. Daily CBC.  - Low intensity heparin gtt    Prophylaxis:    - Mechanical prophylaxis for DVT  - Heparin as above  - Protonix qd     Lines/ tubes/ drains:  - RIJ, [place], Arterial line, L pleural chest tubes x2      Disposition:  - CV ICU.     Patient seen, findings and plan discussed with CVTS fellow.    Leyla Bowling MD  General Surgery PGY-3  ====================================    SUBJECTIVE:   No acute events overnight  UOP still low  Denies pain   Poor appetite    OBJECTIVE:   1. VITAL SIGNS:   Temp:  [96.1  F (35.6  C)-97.3  F (36.3  C)] (P) 96.1  F (35.6  C)  Pulse:  [89-93] 89  Heart Rate:  [89-93] 89  Resp:  [8-29] 11  BP: (102-117)/(56-68) 104/62  SpO2:  [95 %-99 %] 97 %  Resp: 11      2. INTAKE/ OUTPUT:   I/O last 3 completed shifts:  In: 1802.31 [P.O.:650; I.V.:402.31; IV Piggyback:250]  Out: 985 [Urine:230; Other:175; Chest Tube:580]    3. PHYSICAL EXAMINATION:     General: very pleasant, appropriately interactive   Neuro: Alert and oriented by 3, no focal deficits  Resp: CTAB on room air  CV: Regular rate, regular rhythm, serosanguinous output from L pleural chest tube, trace pedal edema  Abdomen:  Soft, Non-distended, non-tender  Incisions: c/d/i  Extremities: warm and well perfused    4. INVESTIGATIONS:   Arterial Blood Gases   No lab results found in last 7 days.  Complete Blood Count   Recent Labs   Lab 03/07/19  0351 03/06/19  1536 03/06/19  0422 03/05/19  1547   WBC 14.1* 13.8* 16.0* 21.0*   HGB 7.9* 7.9* 8.3* 8.3*    204 206 203     Basic Metabolic Panel  Recent Labs   Lab 03/07/19  0910 03/07/19  0351 03/06/19  2154 03/06/19  1536   * 127* 126* 128*   POTASSIUM 4.8 4.8 4.7 4.6   CHLORIDE 92* 92* 94 94   CO2 15* 16* 16* 18*   * 102* 99* 93*   CR 4.01* 3.90* 3.48* 3.27*   GLC 89 91 99 95     Liver Function Tests  Recent Labs   Lab 03/07/19  0351 03/06/19  0422 03/05/19  0346 03/02/19  0930   AST 12 11 16 26   ALT 18 21 23 36   ALKPHOS 98 100 102 87   BILITOTAL 0.8 0.8 1.0 0.9   ALBUMIN 3.4 3.2* 2.9* 2.8*     Pancreatic Enzymes  No lab results found in last 7 days.  Coagulation Profile  No lab results found in last 7 days.      5. RADIOLOGY:   Recent Results (from the past 24 hour(s))   XR Chest Port 1 View    Narrative    EXAM: XR CHEST PORT 1 VW  3/7/2019 2:18 AM      HISTORY: Lines and tubes.    COMPARISON: Radiograph 3/6/2018    FINDINGS: AP radiograph of the chest. Postsurgical changes of a heart  transplant. Left IJ [place]-Liz catheter tip projects over the right  pulmonary artery. Bilateral chest tubes are stable. Intact sternotomy  wires. Surgical clips project over the mediastinum and left axilla.  Retained lead fragment in the mid SVC.    Trace right apical pneumothorax, decreased in size. No left  pneumothorax. Mild interstitial opacities. Cardiac silhouette is  unchanged. New right lower lobe hazy airspace opacity. Stable  retrocardiac opacity.      Impression    IMPRESSION:   1. Reynolds-Liz catheter tip projects over the right pulmonary artery.  2. New hazy right lower lobe airspace opacities, which may represent  atelectasis or infection.  3. Mild edema, stable.  4. Stable  retrocardiac opacity, likely atelectasis.    I have personally reviewed the examination and initial interpretation  and I agree with the findings.    RADHA ALY MD   US Renal Complete w Duplex Complete    Narrative    Exam: Duplex Doppler ultrasound of the kidneys and bilateral renal  arteries dated 3/7/2019 9:27 AM    Comparison Study: 11/17/2018    Clinical Information: Oliguric renal failure after heart transplant    Technique: B-mode (grayscale) and duplex Doppler evaluation of the  abdominal aorta and renal arteries performed. Velocity measurements  obtained with angle correction at or less than 60 degrees.    Technically difficult examination. Patient could not hold his breath.  Overall difficult visualization.    Findings:    The right kidney measures 11.0 cm. The left kidney measures 10.3 cm.  Cortical thickness and echogenicity is normal. Scarring in the left  renal cortex. No evidence of stones, masses or hydronephrosis.    Peak systolic velocities in the abdominal aorta are:     108 cm/sec in the suprarenal aorta.   75 cm/sec in the infrarenal aorta.    Right renal artery:    100 cm/sec at the origin, 59 cm/sec in the mid artery, and 43 cm/sec  at the hilum with resistive indices at 1.0 in all segments of the  renal artery.    Unable to Doppler arcuate arteries.    To-and-fro flow in the renal veins.    Left renal artery:    Nonvisualized renal artery at the origin and mid renal artery.  43  cm/sec at the hilum with resistive indices at 1.0.    Unable to Doppler arcuate arteries.    To-and-fro flow in the renal veins.    Moderate ascites.      Impression    Impression:    1. Technically difficult study. There is no diastolic flow in the  renal arteries bilaterally with resistive indices of 1.0, with to and  fro flow in the renal veins. Unable to Doppler arcuate arteries.  Findings are likely secondary to venous congestion, with differential  including right heart failure.    2. Moderate  ascites.    3. Scarring in the left renal cortex.    I have personally reviewed the examination and initial interpretation  and I agree with the findings.    LOLA BARROW MD       =========================================

## 2019-03-07 NOTE — PROGRESS NOTES
ADVANCED HEART FAILURE PROGRESS NOTE     SUBJECTIVE:  No acute events, reports feeling a little better this morning.     ROS otherwise negative.    OBJECTIVE:  Vital signs:  Temp: 96.2  F (35.7  C) Temp  Min: 96.2  F (35.7  C)  Max: 97.5  F (36.4  C)  Heart Rate: 90 Heart Rate  Min: 90  Max: 93  BP: 106/66 Systolic (24hrs), Av , Min:102 , Max:117   Diastolic (24hrs), Av, Min:56, Max:68    Resp: 29 Resp  Min: 8  Max: 30  SpO2: 99 % SpO2  Min: 92 %  Max: 99 %    [place]aiser: CVP 19, PA 38/18. PCW 20, SvO2 65%, CI 3.3,       Intake/Output Summary (Last 24 hours) at 3/6/2019 0644  Last data filed at 3/6/2019 0500  Gross per 24 hour   Intake 2304.98 ml   Output 980 ml   Net 1324.98 ml       Vitals:    19 0400 19 0400 19 0400   Weight: 61.6 kg (135 lb 12.9 oz) 61.7 kg (136 lb 0.4 oz) 62.1 kg (136 lb 14.5 oz)       Physical Exam:  GEN:  Alert, interactive, appears comfortable, NAD.  HEENT:  [place] R internal jugular with saturated bandage  CV:  Regular rate and rhythm, no murmur. Elevated JVP  CHEST: Subclavian incision sutured.  Chest tubes.  LUNGS:  On room air, normal work of breathing, Clear to auscultation bilaterally, no wheezes or crackles.   ABD:  Active bowel sounds, soft, non-tender/non-distended.  No rebound/guarding/rigidity.  EXT:  No edema or cyanosis.  Hands/feet warm to touch with good signs of peripheral perfusion.   NEURO:  Alert and oriented, no new focal deficits appreciated.  PSCYH: Normal mood and affect      Medications:    DOBUTamine 2.5 mcg/kg/min (19 0800)     HEParin 950 Units/hr (19)     - MEDICATION INSTRUCTIONS -       - MEDICATION INSTRUCTIONS -       BETA BLOCKER NOT PRESCRIBED         Current Facility-Administered Medications   Medication Dose Route Frequency     aspirin  81 mg Oral Daily     DULoxetine  20 mg Oral Daily     fluticasone  2 puff Inhalation Q12H     heparin lock flush  5 mL Intravenous Q24H     insulin aspart  1-7 Units Subcutaneous  TID AC     insulin aspart  1-5 Units Subcutaneous At Bedtime     levothyroxine  100 mcg Oral Daily     lidocaine  1 patch Transdermal Q24H     lidocaine   Transdermal Q8H     lidocaine   Transdermal Q24h     melatonin  3 mg Oral At Bedtime     mesalamine  2,400 mg Oral BID     montelukast  10 mg Oral At Bedtime     mycophenolate  1,500 mg Oral BID IS     nystatin  500,000 Units Oral 4x Daily     polyethylene glycol  17 g Oral Daily     predniSONE  20 mg Oral BID w/meals     ranitidine  75 mg Oral BID     rosuvastatin  10 mg Oral Daily     senna-docusate  1 tablet Oral BID    Or     senna-docusate  2 tablet Oral BID     sildenafil  20 mg Oral TID     sodium chloride (PF)  3 mL Intracatheter Q8H     tacrolimus  1.5 mg Oral QAM     tacrolimus  2 mg Oral QPM     terbutaline  10 mg Oral Q8H     [START ON 3/9/2019] valGANciclovir  450 mg Oral Once per day on Tue Sat         Labs:  CMP  Recent Labs   Lab 03/07/19  0351 03/06/19  2154 03/06/19  1536  03/06/19  0422   * 126* 128*   < > 126*   POTASSIUM 4.8 4.7 4.6   < > 4.6   CHLORIDE 92* 94 94   < > 93*   CO2 16* 16* 18*   < > 17*   * 99* 93*   < > 97*   CR 3.90* 3.48* 3.27*   < > 3.25*   RADAMES 8.0* 7.6* 7.5*   < > 7.9*   MAG 3.0*  --  2.7*  --  3.1*   PHOS 8.4*  --   --   --  7.7*   GLC 91 99 95   < > 102*   ALKPHOS 98  --   --   --  100   BILITOTAL 0.8  --   --   --  0.8   AST 12  --   --   --  11   ALT 18  --   --   --  21    < > = values in this interval not displayed.     BLOOD GASNo lab results found in last 7 days.  CBC  Recent Labs   Lab 03/07/19  0351 03/06/19  1536   WBC 14.1* 13.8*   HGB 7.9* 7.9*   HCT 24.6* 24.8*    204     COAGNo lab results found in last 7 days.      ASSESSMENT/PLAN:  Everton Larios is a 52 yo gentleman who underwent orthotopic heart transplant 2/24/19 for non ischemic cardiomyopathy and cardiogenic shock. Post-op complicated by RV dysfunction and FRANKLIN.     Updates today:  Creatinine rising after fluid challenge on  3/6    #Oliguric renal failure:  Etiology cardiorenal from venous congestion vs. nephrotoxins.  - will talk with renal about diuretic dosing given volume up  - nephrology consulted, appreciate assistance  - change PPI to H2 blocker  - tac level from 3/7 AM 10.6 (goal low end of 10-12)    #Hyponatremia:  Likely hypervolemic hyponatremia from mild RV failure.  -Daily BMP  -Urine Na  -Diuresis as above     #S/p OHT on 2/24:  Graft function:  - echo 3/1 shows normal LV function, RV is mild to moderately dilated and hypokinetic  - dobutamine at 5 for continue RV and hemodynamic support  - continue sildenafil 20 q8h  - off isuprel, continue terbutaline 10 q8h     Immunosuppression:  - on MMF 1500 BID  - s/p methylpred 125 x 3 doses, continue prednisone 20mg BID, taper by 5 daily with negative biopsies  - NOT Simulect protocol, on tacro 1.5mg in AM and 2mg in PM, following levels  - tac level from 3/7 AM 10.6 (goal low end of 10-12)     Prophylaxis:  - CMV +/+: medium risk, start renal dose Valcyte  - PCP: holding Bactrim for now, pentamadine given 3/3  - Thrush: start nystatin  - GI: on PPI  - CAV: start Crestor, holding ASA given bleeding at line sites     Biopsies:  - First biopsy Monday 3/4, 0R, no AMR  - second biopsy 3/11     Goretex conduit for persistent left SVC:  - will need anticoagulation, on low intensity heparin       Seen and staffed with Dr. Shilo Silva M.D  Cardiology Fellow, PGY-4    I have reviewed today's vital signs, notes, medications, labs and imaging.  I have also seen and examined the patient and agree with the findings and plan as outlined above. Pt without new complaints.  VSS with pt afebrile HR 92, RR 26, 117/63, CVP 17, PCW 15, CO 5.8 and .  Lungs clear and tachy S1 and S2 with JVP 16 cm.  Labs with Cr 3.9 and WBC 14.1 and tac trough 10.6.  Assessment: Pt with recent OHT now with FRANKLIN, Right heart failure and hypervolemic state.   1) Renal dose meds  2) diuretics today   3)  Continue Dbx and may increase dose to mobilze fluid  4) prograf is therapeutic    Pt seen X3 today for total critical care time 30 min.     Stevie Lao MD, PhD  Professor, Heart Failure and Cardiac Transplantation  [facility]

## 2019-03-08 ENCOUNTER — APPOINTMENT (OUTPATIENT)
Dept: GENERAL RADIOLOGY | Facility: CLINIC | Age: 54
DRG: 001 | End: 2019-03-08
Attending: INTERNAL MEDICINE
Payer: COMMERCIAL

## 2019-03-08 ENCOUNTER — APPOINTMENT (OUTPATIENT)
Dept: GENERAL RADIOLOGY | Facility: CLINIC | Age: 54
DRG: 001 | End: 2019-03-08
Attending: PHYSICIAN ASSISTANT
Payer: COMMERCIAL

## 2019-03-08 ENCOUNTER — APPOINTMENT (OUTPATIENT)
Dept: CT IMAGING | Facility: CLINIC | Age: 54
DRG: 001 | End: 2019-03-08
Attending: SURGERY
Payer: COMMERCIAL

## 2019-03-08 LAB
ALBUMIN SERPL-MCNC: 3.2 G/DL (ref 3.4–5)
ALBUMIN SERPL-MCNC: 3.3 G/DL (ref 3.4–5)
ALP SERPL-CCNC: 114 U/L (ref 40–150)
ALP SERPL-CCNC: 119 U/L (ref 40–150)
ALT SERPL W P-5'-P-CCNC: 19 U/L (ref 0–70)
ALT SERPL W P-5'-P-CCNC: 20 U/L (ref 0–70)
ANION GAP SERPL CALCULATED.3IONS-SCNC: 15 MMOL/L (ref 3–14)
ANION GAP SERPL CALCULATED.3IONS-SCNC: 16 MMOL/L (ref 3–14)
ANION GAP SERPL CALCULATED.3IONS-SCNC: 19 MMOL/L (ref 3–14)
ANION GAP SERPL CALCULATED.3IONS-SCNC: 20 MMOL/L (ref 3–14)
AST SERPL W P-5'-P-CCNC: 13 U/L (ref 0–45)
AST SERPL W P-5'-P-CCNC: 14 U/L (ref 0–45)
BASE DEFICIT BLDV-SCNC: 11 MMOL/L
BASE DEFICIT BLDV-SCNC: 11.7 MMOL/L
BASE DEFICIT BLDV-SCNC: 11.9 MMOL/L
BASE DEFICIT BLDV-SCNC: 14 MMOL/L
BASOPHILS # BLD AUTO: 0 10E9/L (ref 0–0.2)
BASOPHILS NFR BLD AUTO: 0 %
BILIRUB SERPL-MCNC: 0.8 MG/DL (ref 0.2–1.3)
BILIRUB SERPL-MCNC: 0.8 MG/DL (ref 0.2–1.3)
BUN SERPL-MCNC: 111 MG/DL (ref 7–30)
BUN SERPL-MCNC: 114 MG/DL (ref 7–30)
BUN SERPL-MCNC: 118 MG/DL (ref 7–30)
BUN SERPL-MCNC: 124 MG/DL (ref 7–30)
CA-I SERPL ISE-MCNC: 4.2 MG/DL (ref 4.4–5.2)
CALCIUM SERPL-MCNC: 7.4 MG/DL (ref 8.5–10.1)
CALCIUM SERPL-MCNC: 7.5 MG/DL (ref 8.5–10.1)
CALCIUM SERPL-MCNC: 7.6 MG/DL (ref 8.5–10.1)
CALCIUM SERPL-MCNC: 8.2 MG/DL (ref 8.5–10.1)
CHLORIDE SERPL-SCNC: 93 MMOL/L (ref 94–109)
CHLORIDE SERPL-SCNC: 93 MMOL/L (ref 94–109)
CHLORIDE SERPL-SCNC: 97 MMOL/L (ref 94–109)
CHLORIDE SERPL-SCNC: 99 MMOL/L (ref 94–109)
CO2 SERPL-SCNC: 14 MMOL/L (ref 20–32)
CO2 SERPL-SCNC: 15 MMOL/L (ref 20–32)
CREAT SERPL-MCNC: 4.07 MG/DL (ref 0.66–1.25)
CREAT SERPL-MCNC: 4.22 MG/DL (ref 0.66–1.25)
CREAT SERPL-MCNC: 4.38 MG/DL (ref 0.66–1.25)
CREAT SERPL-MCNC: 4.75 MG/DL (ref 0.66–1.25)
DIFFERENTIAL METHOD BLD: ABNORMAL
EOSINOPHIL # BLD AUTO: 0 10E9/L (ref 0–0.7)
EOSINOPHIL NFR BLD AUTO: 0 %
ERYTHROCYTE [DISTWIDTH] IN BLOOD BY AUTOMATED COUNT: 15.1 % (ref 10–15)
ERYTHROCYTE [DISTWIDTH] IN BLOOD BY AUTOMATED COUNT: 15.3 % (ref 10–15)
GFR SERPL CREATININE-BSD FRML MDRD: 13 ML/MIN/{1.73_M2}
GFR SERPL CREATININE-BSD FRML MDRD: 14 ML/MIN/{1.73_M2}
GFR SERPL CREATININE-BSD FRML MDRD: 15 ML/MIN/{1.73_M2}
GFR SERPL CREATININE-BSD FRML MDRD: 15 ML/MIN/{1.73_M2}
GLUCOSE BLDC GLUCOMTR-MCNC: 102 MG/DL (ref 70–99)
GLUCOSE BLDC GLUCOMTR-MCNC: 96 MG/DL (ref 70–99)
GLUCOSE BLDC GLUCOMTR-MCNC: 96 MG/DL (ref 70–99)
GLUCOSE BLDC GLUCOMTR-MCNC: 99 MG/DL (ref 70–99)
GLUCOSE SERPL-MCNC: 111 MG/DL (ref 70–99)
GLUCOSE SERPL-MCNC: 89 MG/DL (ref 70–99)
GLUCOSE SERPL-MCNC: 92 MG/DL (ref 70–99)
GLUCOSE SERPL-MCNC: 98 MG/DL (ref 70–99)
HCO3 BLDV-SCNC: 12 MMOL/L (ref 21–28)
HCO3 BLDV-SCNC: 15 MMOL/L (ref 21–28)
HCT VFR BLD AUTO: 24.5 % (ref 40–53)
HCT VFR BLD AUTO: 26.3 % (ref 40–53)
HGB BLD-MCNC: 7.8 G/DL (ref 13.3–17.7)
HGB BLD-MCNC: 9 G/DL (ref 13.3–17.7)
LMWH PPP CHRO-ACNC: 0.36 IU/ML
LMWH PPP CHRO-ACNC: 0.43 IU/ML
LYMPHOCYTES # BLD AUTO: 0.1 10E9/L (ref 0.8–5.3)
LYMPHOCYTES NFR BLD AUTO: 0.9 %
MAGNESIUM SERPL-MCNC: 2.7 MG/DL (ref 1.6–2.3)
MAGNESIUM SERPL-MCNC: 2.9 MG/DL (ref 1.6–2.3)
MAGNESIUM SERPL-MCNC: 3.2 MG/DL (ref 1.6–2.3)
MCH RBC QN AUTO: 29.8 PG (ref 26.5–33)
MCH RBC QN AUTO: 31.5 PG (ref 26.5–33)
MCHC RBC AUTO-ENTMCNC: 31.8 G/DL (ref 31.5–36.5)
MCHC RBC AUTO-ENTMCNC: 34.2 G/DL (ref 31.5–36.5)
MCV RBC AUTO: 92 FL (ref 78–100)
MCV RBC AUTO: 94 FL (ref 78–100)
MONOCYTES # BLD AUTO: 0 10E9/L (ref 0–1.3)
MONOCYTES NFR BLD AUTO: 0 %
NEUTROPHILS # BLD AUTO: 14.7 10E9/L (ref 1.6–8.3)
NEUTROPHILS NFR BLD AUTO: 99.1 %
O2/TOTAL GAS SETTING VFR VENT: 21 %
OXYHGB MFR BLDV: 57 %
OXYHGB MFR BLDV: 62 %
OXYHGB MFR BLDV: 63 %
OXYHGB MFR BLDV: 64 %
PCO2 BLDV: 29 MM HG (ref 40–50)
PCO2 BLDV: 33 MM HG (ref 40–50)
PCO2 BLDV: 34 MM HG (ref 40–50)
PCO2 BLDV: 34 MM HG (ref 40–50)
PH BLDV: 7.23 PH (ref 7.32–7.43)
PH BLDV: 7.24 PH (ref 7.32–7.43)
PH BLDV: 7.25 PH (ref 7.32–7.43)
PH BLDV: 7.26 PH (ref 7.32–7.43)
PHOSPHATE SERPL-MCNC: 9.3 MG/DL (ref 2.5–4.5)
PHOSPHATE SERPL-MCNC: 9.3 MG/DL (ref 2.5–4.5)
PLATELET # BLD AUTO: 224 10E9/L (ref 150–450)
PLATELET # BLD AUTO: 254 10E9/L (ref 150–450)
PLATELET # BLD EST: ABNORMAL 10*3/UL
PO2 BLDV: 36 MM HG (ref 25–47)
PO2 BLDV: 39 MM HG (ref 25–47)
POTASSIUM SERPL-SCNC: 4.8 MMOL/L (ref 3.4–5.3)
POTASSIUM SERPL-SCNC: 4.8 MMOL/L (ref 3.4–5.3)
POTASSIUM SERPL-SCNC: 5.1 MMOL/L (ref 3.4–5.3)
POTASSIUM SERPL-SCNC: 5.3 MMOL/L (ref 3.4–5.3)
PROT SERPL-MCNC: 5.6 G/DL (ref 6.8–8.8)
PROT SERPL-MCNC: 5.7 G/DL (ref 6.8–8.8)
RBC # BLD AUTO: 2.62 10E12/L (ref 4.4–5.9)
RBC # BLD AUTO: 2.86 10E12/L (ref 4.4–5.9)
RBC MORPH BLD: NORMAL
SODIUM SERPL-SCNC: 126 MMOL/L (ref 133–144)
SODIUM SERPL-SCNC: 126 MMOL/L (ref 133–144)
SODIUM SERPL-SCNC: 127 MMOL/L (ref 133–144)
SODIUM SERPL-SCNC: 129 MMOL/L (ref 133–144)
WBC # BLD AUTO: 14.5 10E9/L (ref 4–11)
WBC # BLD AUTO: 14.8 10E9/L (ref 4–11)

## 2019-03-08 PROCEDURE — 99233 SBSQ HOSP IP/OBS HIGH 50: CPT | Mod: 25 | Performed by: INTERNAL MEDICINE

## 2019-03-08 PROCEDURE — 20000004 ZZH R&B ICU UMMC

## 2019-03-08 PROCEDURE — 40000739 ZZH STATISTIC STROKE CODE W/O ACCESS

## 2019-03-08 PROCEDURE — 25000132 ZZH RX MED GY IP 250 OP 250 PS 637: Performed by: INTERNAL MEDICINE

## 2019-03-08 PROCEDURE — 83735 ASSAY OF MAGNESIUM: CPT | Performed by: PHYSICIAN ASSISTANT

## 2019-03-08 PROCEDURE — 25000128 H RX IP 250 OP 636: Performed by: RADIOLOGY

## 2019-03-08 PROCEDURE — 85520 HEPARIN ASSAY: CPT | Performed by: PHYSICIAN ASSISTANT

## 2019-03-08 PROCEDURE — 90947 DIALYSIS REPEATED EVAL: CPT

## 2019-03-08 PROCEDURE — 25000125 ZZHC RX 250: Performed by: GENERAL ACUTE CARE HOSPITAL

## 2019-03-08 PROCEDURE — 25000131 ZZH RX MED GY IP 250 OP 636 PS 637: Performed by: STUDENT IN AN ORGANIZED HEALTH CARE EDUCATION/TRAINING PROGRAM

## 2019-03-08 PROCEDURE — 85520 HEPARIN ASSAY: CPT | Performed by: STUDENT IN AN ORGANIZED HEALTH CARE EDUCATION/TRAINING PROGRAM

## 2019-03-08 PROCEDURE — 25000128 H RX IP 250 OP 636: Performed by: STUDENT IN AN ORGANIZED HEALTH CARE EDUCATION/TRAINING PROGRAM

## 2019-03-08 PROCEDURE — 99233 SBSQ HOSP IP/OBS HIGH 50: CPT | Mod: GC | Performed by: ANESTHESIOLOGY

## 2019-03-08 PROCEDURE — 71045 X-RAY EXAM CHEST 1 VIEW: CPT

## 2019-03-08 PROCEDURE — 25000132 ZZH RX MED GY IP 250 OP 250 PS 637: Performed by: STUDENT IN AN ORGANIZED HEALTH CARE EDUCATION/TRAINING PROGRAM

## 2019-03-08 PROCEDURE — 84100 ASSAY OF PHOSPHORUS: CPT | Performed by: GENERAL ACUTE CARE HOSPITAL

## 2019-03-08 PROCEDURE — 85025 COMPLETE CBC W/AUTO DIFF WBC: CPT | Performed by: GENERAL ACUTE CARE HOSPITAL

## 2019-03-08 PROCEDURE — 83735 ASSAY OF MAGNESIUM: CPT | Performed by: GENERAL ACUTE CARE HOSPITAL

## 2019-03-08 PROCEDURE — 84100 ASSAY OF PHOSPHORUS: CPT | Performed by: PHYSICIAN ASSISTANT

## 2019-03-08 PROCEDURE — 25800030 ZZH RX IP 258 OP 636: Performed by: GENERAL ACUTE CARE HOSPITAL

## 2019-03-08 PROCEDURE — 82805 BLOOD GASES W/O2 SATURATION: CPT | Performed by: PHYSICIAN ASSISTANT

## 2019-03-08 PROCEDURE — 70450 CT HEAD/BRAIN W/O DYE: CPT

## 2019-03-08 PROCEDURE — 80053 COMPREHEN METABOLIC PANEL: CPT | Performed by: GENERAL ACUTE CARE HOSPITAL

## 2019-03-08 PROCEDURE — 00000146 ZZHCL STATISTIC GLUCOSE BY METER IP

## 2019-03-08 PROCEDURE — 80048 BASIC METABOLIC PNL TOTAL CA: CPT | Performed by: PHYSICIAN ASSISTANT

## 2019-03-08 PROCEDURE — 25000125 ZZHC RX 250: Performed by: INTERNAL MEDICINE

## 2019-03-08 PROCEDURE — 25800030 ZZH RX IP 258 OP 636

## 2019-03-08 PROCEDURE — 80053 COMPREHEN METABOLIC PANEL: CPT | Performed by: PHYSICIAN ASSISTANT

## 2019-03-08 PROCEDURE — 85027 COMPLETE CBC AUTOMATED: CPT | Performed by: PHYSICIAN ASSISTANT

## 2019-03-08 PROCEDURE — 25000131 ZZH RX MED GY IP 250 OP 636 PS 637: Performed by: INTERNAL MEDICINE

## 2019-03-08 PROCEDURE — 82805 BLOOD GASES W/O2 SATURATION: CPT | Performed by: STUDENT IN AN ORGANIZED HEALTH CARE EDUCATION/TRAINING PROGRAM

## 2019-03-08 PROCEDURE — 25000132 ZZH RX MED GY IP 250 OP 250 PS 637: Performed by: SURGERY

## 2019-03-08 PROCEDURE — 82330 ASSAY OF CALCIUM: CPT | Performed by: GENERAL ACUTE CARE HOSPITAL

## 2019-03-08 PROCEDURE — 80048 BASIC METABOLIC PNL TOTAL CA: CPT | Performed by: STUDENT IN AN ORGANIZED HEALTH CARE EDUCATION/TRAINING PROGRAM

## 2019-03-08 PROCEDURE — 25000132 ZZH RX MED GY IP 250 OP 250 PS 637: Performed by: ANESTHESIOLOGY

## 2019-03-08 PROCEDURE — 40000986 XR CHEST PORT 1 VW

## 2019-03-08 RX ORDER — HEPARIN SODIUM 10000 [USP'U]/100ML
0-3500 INJECTION, SOLUTION INTRAVENOUS CONTINUOUS
Status: DISCONTINUED | OUTPATIENT
Start: 2019-03-08 | End: 2019-03-14

## 2019-03-08 RX ORDER — MAGNESIUM SULFATE HEPTAHYDRATE 40 MG/ML
2 INJECTION, SOLUTION INTRAVENOUS EVERY 6 HOURS PRN
Status: DISCONTINUED | OUTPATIENT
Start: 2019-03-08 | End: 2019-03-13

## 2019-03-08 RX ORDER — SODIUM CHLORIDE 9 MG/ML
INJECTION, SOLUTION INTRAVENOUS
Status: COMPLETED
Start: 2019-03-08 | End: 2019-03-08

## 2019-03-08 RX ORDER — POTASSIUM CHLORIDE 29.8 MG/ML
20 INJECTION INTRAVENOUS EVERY 6 HOURS PRN
Status: DISCONTINUED | OUTPATIENT
Start: 2019-03-08 | End: 2019-03-13

## 2019-03-08 RX ORDER — VALGANCICLOVIR 450 MG/1
450 TABLET, FILM COATED ORAL EVERY OTHER DAY
Status: DISCONTINUED | OUTPATIENT
Start: 2019-03-08 | End: 2019-03-11

## 2019-03-08 RX ADMIN — MONTELUKAST SODIUM 10 MG: 10 TABLET, COATED ORAL at 22:27

## 2019-03-08 RX ADMIN — CALCIUM CHLORIDE, MAGNESIUM CHLORIDE, SODIUM CHLORIDE, SODIUM BICARBONATE, POTASSIUM CHLORIDE AND SODIUM PHOSPHATE DIBASIC DIHYDRATE 12.5 ML/KG/HR: 3.68; 3.05; 6.34; 3.09; .314; .187 INJECTION INTRAVENOUS at 22:24

## 2019-03-08 RX ADMIN — ACETAMINOPHEN 650 MG: 325 TABLET, FILM COATED ORAL at 22:27

## 2019-03-08 RX ADMIN — TERBUTALINE SULFATE 10 MG: 5 TABLET ORAL at 16:11

## 2019-03-08 RX ADMIN — SILDENAFIL 20 MG: 20 TABLET ORAL at 14:48

## 2019-03-08 RX ADMIN — FLUTICASONE PROPIONATE 2 PUFF: 220 AEROSOL, METERED RESPIRATORY (INHALATION) at 07:57

## 2019-03-08 RX ADMIN — CALCIUM CHLORIDE 1 G: 100 INJECTION, SOLUTION INTRAVENOUS at 23:39

## 2019-03-08 RX ADMIN — TAMSULOSIN HYDROCHLORIDE 0.4 MG: 0.4 CAPSULE ORAL at 07:55

## 2019-03-08 RX ADMIN — FLUTICASONE PROPIONATE 2 PUFF: 220 AEROSOL, METERED RESPIRATORY (INHALATION) at 20:04

## 2019-03-08 RX ADMIN — RANITIDINE 75 MG: 75 TABLET, FILM COATED ORAL at 07:57

## 2019-03-08 RX ADMIN — SODIUM CHLORIDE: 9 INJECTION, SOLUTION INTRAVENOUS at 22:26

## 2019-03-08 RX ADMIN — TERBUTALINE SULFATE 10 MG: 5 TABLET ORAL at 07:58

## 2019-03-08 RX ADMIN — MELATONIN TAB 3 MG 3 MG: 3 TAB at 20:05

## 2019-03-08 RX ADMIN — CALCIUM CHLORIDE, MAGNESIUM CHLORIDE, SODIUM CHLORIDE, SODIUM BICARBONATE, POTASSIUM CHLORIDE AND SODIUM PHOSPHATE DIBASIC DIHYDRATE 12.5 ML/KG/HR: 3.68; 3.05; 6.34; 3.09; .314; .187 INJECTION INTRAVENOUS at 19:52

## 2019-03-08 RX ADMIN — ASPIRIN 81 MG: 81 TABLET, COATED ORAL at 07:55

## 2019-03-08 RX ADMIN — VALGANCICLOVIR HYDROCHLORIDE 450 MG: 450 TABLET ORAL at 20:12

## 2019-03-08 RX ADMIN — SODIUM CHLORIDE: 9 INJECTION, SOLUTION INTRAVENOUS at 16:15

## 2019-03-08 RX ADMIN — TERBUTALINE SULFATE 10 MG: 5 TABLET ORAL at 00:00

## 2019-03-08 RX ADMIN — CALCIUM CHLORIDE, MAGNESIUM CHLORIDE, SODIUM CHLORIDE, SODIUM BICARBONATE, POTASSIUM CHLORIDE AND SODIUM PHOSPHATE DIBASIC DIHYDRATE 3.66 ML/KG/HR: 3.68; 3.05; 6.34; 3.09; .314; .187 INJECTION INTRAVENOUS at 22:24

## 2019-03-08 RX ADMIN — Medication 5 ML: at 18:05

## 2019-03-08 RX ADMIN — PREDNISONE 20 MG: 10 TABLET ORAL at 18:05

## 2019-03-08 RX ADMIN — Medication 500000 UNITS: at 07:58

## 2019-03-08 RX ADMIN — DULOXETINE 20 MG: 20 CAPSULE, DELAYED RELEASE ORAL at 07:55

## 2019-03-08 RX ADMIN — MYCOPHENOLATE MOFETIL 1500 MG: 500 TABLET ORAL at 18:05

## 2019-03-08 RX ADMIN — ACETAMINOPHEN 650 MG: 325 TABLET, FILM COATED ORAL at 08:56

## 2019-03-08 RX ADMIN — MESALAMINE 2400 MG: 800 TABLET, DELAYED RELEASE ORAL at 20:06

## 2019-03-08 RX ADMIN — Medication 500000 UNITS: at 17:04

## 2019-03-08 RX ADMIN — RANITIDINE 75 MG: 75 TABLET, FILM COATED ORAL at 20:09

## 2019-03-08 RX ADMIN — TACROLIMUS 1.5 MG: 1 CAPSULE ORAL at 07:55

## 2019-03-08 RX ADMIN — LIDOCAINE 1 PATCH: 560 PATCH PERCUTANEOUS; TOPICAL; TRANSDERMAL at 07:54

## 2019-03-08 RX ADMIN — HEPARIN SODIUM 800 UNITS/HR: 10000 INJECTION, SOLUTION INTRAVENOUS at 10:44

## 2019-03-08 RX ADMIN — ONDANSETRON 4 MG: 4 TABLET, ORALLY DISINTEGRATING ORAL at 08:24

## 2019-03-08 RX ADMIN — TACROLIMUS 2 MG: 1 CAPSULE ORAL at 18:05

## 2019-03-08 RX ADMIN — ROSUVASTATIN CALCIUM 10 MG: 10 TABLET, FILM COATED ORAL at 07:55

## 2019-03-08 RX ADMIN — PREDNISONE 20 MG: 10 TABLET ORAL at 07:56

## 2019-03-08 RX ADMIN — SILDENAFIL 20 MG: 20 TABLET ORAL at 07:55

## 2019-03-08 RX ADMIN — MYCOPHENOLATE MOFETIL 1500 MG: 500 TABLET ORAL at 07:56

## 2019-03-08 RX ADMIN — OXYCODONE HYDROCHLORIDE 5 MG: 5 TABLET ORAL at 22:27

## 2019-03-08 RX ADMIN — Medication 500000 UNITS: at 20:26

## 2019-03-08 RX ADMIN — CALCIUM CHLORIDE, MAGNESIUM CHLORIDE, SODIUM CHLORIDE, SODIUM BICARBONATE, POTASSIUM CHLORIDE AND SODIUM PHOSPHATE DIBASIC DIHYDRATE 3.66 ML/KG/HR: 3.68; 3.05; 6.34; 3.09; .314; .187 INJECTION INTRAVENOUS at 19:52

## 2019-03-08 RX ADMIN — LEVOTHYROXINE SODIUM 100 MCG: 100 TABLET ORAL at 07:56

## 2019-03-08 RX ADMIN — SILDENAFIL 20 MG: 20 TABLET ORAL at 20:05

## 2019-03-08 RX ADMIN — MESALAMINE 2400 MG: 800 TABLET, DELAYED RELEASE ORAL at 07:56

## 2019-03-08 ASSESSMENT — ACTIVITIES OF DAILY LIVING (ADL)
ADLS_ACUITY_SCORE: 13
ADLS_ACUITY_SCORE: 11
ADLS_ACUITY_SCORE: 11
ADLS_ACUITY_SCORE: 13
ADLS_ACUITY_SCORE: 11
ADLS_ACUITY_SCORE: 11

## 2019-03-08 ASSESSMENT — MIFFLIN-ST. JEOR: SCORE: 1426.5

## 2019-03-08 ASSESSMENT — PAIN DESCRIPTION - DESCRIPTORS: DESCRIPTORS: DISCOMFORT

## 2019-03-08 NOTE — PROGRESS NOTES
SPIRITUAL HEALTH SERVICES  SPIRITUAL ASSESSMENT Progress Note  [facility] ([place]) 4A     REFERRAL SOURCE: Length of stay.    Attempted to visit pt, but pt was sound asleep and did not wake up when I called his name. I will try to visit pt next week.    PLAN: I will visit pt next week, if pt is still on unit.    Geri Berman  Chaplain Resident  Pager  [phone]

## 2019-03-08 NOTE — PROGRESS NOTES
CV ICU PROGRESS NOTE  March 8, 2019      CO-MORBIDITIES:   Chronic systolic heart failure (H)  (primary encounter diagnosis)  Acute on chronic systolic congestive heart failure (H)    ASSESSMENT: Everton Larios is a 53 year old male with PMH etoh abuse, hypothyroidism, intermittent asthma, ulcerative colitis, Depression, Chronic HFrEF, NICM admitted with cardiogenic shock requiring subclavian balloon pump now s/p OHT 2/24/19 with Piyush House and Crhistopher.      TODAY'S PROGRESS:   - Continue dobutamine 2.5 for RV support  - Discontinue IV fentanyl   - Hypervolemic/worsening mental status- will discuss timing of CRRT with renal team (preferred over iHD to avoid large fluid shifts in setting of R heart failure)  - Will need dialysis line placed today per renal    PLAN:  Neuro/ pain/ sedation:  - Monitor neurological status. Notify the MD for any acute changes in exam.  - Tylenol and oxycodone       #Depression  - PTA cymbalta 20mg  - Melatonin for sleep     Pulmonary care:   - Doing well on room air  - Supp oxygen to maintain SpO2 >92%  - Encourage IS and deep breathing  - Chest tubes to suction.     #Intermittent asthma  - PTA Montelukast ordered    Cardiovascular: HR goal >95. Echo 3/5 with good LV function, mild decreased RV function (moderately dilated).  - Monitor hemodynamic status.   - MAP >65. HR goal > 95.  - Dobutamine 2.5 mcg/kg/min for RV support  - Sildenafil 20 q8h   - Terbutaline 10 mg q8h  - Continue statin.   - No aspirin per cards.   - Pacing wires capped.     GI care:   - Regular diet  - Senna, miralax and suppository for bowel regimen; MoM and miralax prn     #Intraperitoneal free air (CT obtained 2/26)   - Small peritoneal defect made during redo operation, closed with stitches intra-op- likely source of free air. Resolved.    #Pierce's esophagus  #Ulcerative colitis  -PTA Mesalamine     Fluids/ Electrolytes/ Nutrition:   - TKO for IV fluid hydration  - No indication for parenteral  nutrition.  - Replace electrolytes as needed.  #Hyponatremia  - Asymptomatic, no replacement for now, monitor sodium q6h    Renal/ Fluid Balance: FRANKLIN, oliguric.    - Etiology likely cardiorenal from venous congestion vs nephrotoxicity from multiple medications  - Appears hypervolemic and with worsening mental status; no response to diuretics yesterday - will discus CRRT with renal  - Would prefer CRRT rather than iHD to prevent large fluid shifts  - Will continue to monitor intake and output.  - PTA Tamsulosin      Endocrine:    # Stress hyperglycemia  - sliding scale insulin as needed  # hypothyroidism  - Home levothyroxine      ID/ Antibiotics:  #Leukocytosis  - Pan cultured 3/4 for leukocytosis- NGTD. Afebrile. No active concern for infection, no antibiotics.  - Perioperative antibiotics vanc/zosyn x 3 days- completed.  - Valcyte for CMV prophylaxis; holding bactrim for PCP prophylaxis.  - Pentamidine given on 3/4.   - Nystatin for thrush     Heme:     #Bypass of persistent left SVC to R atrial appendage with [place] graft. Will require lifelong anti-coagulation for graft  - Hgb goal >7. Daily CBC.  - Low intensity heparin gtt    Prophylaxis:    - Mechanical prophylaxis for DVT  - Heparin as above  - Protonix qd     Lines/ tubes/ drains:  - RIJ, [place], Arterial line, L pleural chest tubes x2      Disposition:  - CV ICU.     Patient seen, findings and plan discussed with CV ICU staff, Dr. Henderson.    Leyla Bowling MD  General Surgery PGY-3  ====================================    SUBJECTIVE:   Appears to have worsening mental status today  Fatigues easily; dyspnea with exertion  Poor appetite  UOP remains low despite diuretics    OBJECTIVE:   1. VITAL SIGNS:   Temp:  [96.3  F (35.7  C)-97.5  F (36.4  C)] 97.5  F (36.4  C)  Pulse:  [88-95] 88  Heart Rate:  [88-94] 88  Resp:  [10-23] 14  BP: ()/(51-70) 97/51  SpO2:  [96 %-100 %] 98 %  Resp: 14      2. INTAKE/ OUTPUT:   I/O last 3 completed shifts:  In: 1113.4  [P.O.:675; I.V.:438.4]  Out: 1005 [Urine:305; Chest Tube:700]    3. PHYSICAL EXAMINATION:     General: very pleasant, not easily arousable, intermittently nods to questions, easily fatigued   Neuro: moves all four extremities spontaneously  Resp: CTAB on room air, diminished at bases  CV: Regular rate, regular rhythm, serosanguinous output from L pleural chest tube   Abdomen: Soft, Non-distended, non-tender  Incisions: c/d/i  Extremities: warm and well perfused, 2+ peripheral edema    4. INVESTIGATIONS:   Arterial Blood Gases   No lab results found in last 7 days.  Complete Blood Count   Recent Labs   Lab 03/08/19  0343 03/07/19  0351 03/06/19  1536 03/06/19  0422   WBC 14.5* 14.1* 13.8* 16.0*   HGB 9.0* 7.9* 7.9* 8.3*    217 204 206     Basic Metabolic Panel  Recent Labs   Lab 03/08/19  1056 03/08/19  0343 03/07/19  2153 03/07/19  1650   * 126* 129* 126*   POTASSIUM 4.8 5.1 4.6 4.9   CHLORIDE 97 93* 96 93*   CO2 14* 14* 14* 18*   * 118* 105* 108*   CR 4.07* 4.38* 3.95* 4.08*   * 98 87 86     Liver Function Tests  Recent Labs   Lab 03/08/19  0343 03/07/19  0351 03/06/19  0422 03/05/19  0346   AST 13 12 11 16   ALT 19 18 21 23   ALKPHOS 114 98 100 102   BILITOTAL 0.8 0.8 0.8 1.0   ALBUMIN 3.3* 3.4 3.2* 2.9*     Pancreatic Enzymes  No lab results found in last 7 days.  Coagulation Profile  No lab results found in last 7 days.      5. RADIOLOGY:   Recent Results (from the past 24 hour(s))   XR Chest Port 1 View    Narrative    EXAM: XR CHEST PORT 1 VW  3/8/2019 1:50 AM      HISTORY: Confirm PA catheter position    COMPARISON: Radiograph 3/7/2019    FINDINGS: AP radiograph of the chest. Left IJ [place]-Liz catheter tip  projects over the right pulmonary artery. Stable bilateral chest  tubes. Surgical clips project over the left axilla.    Cardiac silhouette is stable. New mild perihilar opacities and stable  diffuse interstitial opacities. Decreased size of the left  retrocardiac opacity. Slight  increase in size of the hazy right lower  lobe opacity.      Impression    IMPRESSION:   1. [place]-Liz catheter tip projects over the right pulmonary artery.  2. Mild new perihilar opacities and stable diffuse interstitial  opacities, likely indicating pulmonary edema.  3. Decreased size of the left retrocardiac opacity, likely resolving  atelectasis.  4. Slight increase in size of the hazy right lower lobe opacity, which  may indicate infection or atelectasis.    I have personally reviewed the examination and initial interpretation  and I agree with the findings.    RADHA ALY MD       =========================================

## 2019-03-08 NOTE — PROGRESS NOTES
CVTS PROGRESS NOTE  March 8, 2019      CO-MORBIDITIES:   Chronic systolic heart failure (H)  (primary encounter diagnosis)  Acute on chronic systolic congestive heart failure (H)    ASSESSMENT: Everton Larios is a 53 year old male with PMH etoh abuse, hypothyroidism, intermittent asthma, ulcerative colitis, Depression, Chronic HFrEF, NICM admitted with cardiogenic shock requiring subclavian balloon pump now s/p OHT 2/24/19 with Piyush House and Christopher.      TODAY'S PROGRESS:   - Continue dobutamine 2.5 for RV support  - Discontinue IV fentanyl   - Hypervolemic/worsening mental status- will discuss timing of CRRT with renal team (preferred over iHD to avoid large fluid shifts in setting of R heart failure)  - Will need dialysis line placed today per renal    PLAN:  Neuro/ pain/ sedation:  - Monitor neurological status. Notify the MD for any acute changes in exam.  - Tylenol and oxycodone       #Depression  - PTA cymbalta 20mg  - Melatonin for sleep     Pulmonary care:   - Doing well on room air  - Supp oxygen to maintain SpO2 >92%  - Encourage IS and deep breathing  - Chest tubes to suction.     #Intermittent asthma  - PTA Montelukast ordered    Cardiovascular: HR goal >95. Echo 3/5 with good LV function, mild decreased RV function (moderately dilated).  - Monitor hemodynamic status.   - MAP >65. HR goal > 95.  - Dobutamine 2.5 mcg/kg/min for RV support  - Sildenafil 20 q8h   - Terbutaline 10 mg q8h  - Continue statin.   - No aspirin per cards.   - Pacing wires capped.     GI care:   - Regular diet  - Senna, miralax and suppository for bowel regimen; MoM and miralax prn     #Intraperitoneal free air (CT obtained 2/26)   - Small peritoneal defect made during redo operation, closed with stitches intra-op- likely source of free air. Resolved.    #Pierce's esophagus  #Ulcerative colitis  -PTA Mesalamine     Fluids/ Electrolytes/ Nutrition:   - TKO for IV fluid hydration  - No indication for parenteral nutrition.  -  Replace electrolytes as needed.  #Hyponatremia  - Asymptomatic, no replacement for now, monitor sodium q6h    Renal/ Fluid Balance: FRANKLIN, oliguric.    - Etiology likely cardiorenal from venous congestion vs nephrotoxicity from multiple medications  - Appears hypervolemic and with worsening mental status; no response to diuretics yesterday - will discus CRRT with renal  - Would prefer CRRT rather than iHD to prevent large fluid shifts  - Will continue to monitor intake and output.  - PTA Tamsulosin      Endocrine:    # Stress hyperglycemia  - sliding scale insulin as needed  # hypothyroidism  - Home levothyroxine      ID/ Antibiotics:  #Leukocytosis  - Pan cultured 3/4 for leukocytosis- NGTD. Afebrile. No active concern for infection, no antibiotics.  - Perioperative antibiotics vanc/zosyn x 3 days- completed.  - Valcyte for CMV prophylaxis; holding bactrim for PCP prophylaxis.  - Pentamidine given on 3/4.   - Nystatin for thrush     Heme:     #Bypass of persistent left SVC to R atrial appendage with Ver[place]-Davis graft. Will require lifelong anti-coagulation for graft  - Hgb goal >7. Daily CBC.  - Low intensity heparin gtt    Prophylaxis:    - Mechanical prophylaxis for DVT  - Heparin as above  - Protonix qd     Lines/ tubes/ drains:  - RIJ, [place], Arterial line, L pleural chest tubes x2      Disposition:  - CV ICU.     Patient seen, findings and plan discussed with CVTS fellow.    Leyla Bowling MD  General Surgery PGY-3  ====================================    SUBJECTIVE:   Appears to have worsening mental status today  Fatigues easily; dyspnea with exertion  Poor appetite  UOP remains low despite diuretics    OBJECTIVE:   1. VITAL SIGNS:   Temp:  [96.3  F (35.7  C)-97.5  F (36.4  C)] 97.5  F (36.4  C)  Pulse:  [88-95] 88  Heart Rate:  [88-94] 88  Resp:  [10-23] 14  BP: ()/(51-70) 97/51  SpO2:  [96 %-100 %] 98 %  Resp: 14      2. INTAKE/ OUTPUT:   I/O last 3 completed shifts:  In: 1113.4 [P.O.:675;  I.V.:438.4]  Out: 1005 [Urine:305; Chest Tube:700]    3. PHYSICAL EXAMINATION:     General: very pleasant, not easily arousable, intermittently nods to questions, easily fatigued   Neuro: moves all four extremities spontaneously  Resp: CTAB on room air, diminished at bases  CV: Regular rate, regular rhythm, serosanguinous output from L pleural chest tube   Abdomen: Soft, Non-distended, non-tender  Incisions: c/d/i  Extremities: warm and well perfused, 2+ peripheral edema    4. INVESTIGATIONS:   Arterial Blood Gases   No lab results found in last 7 days.  Complete Blood Count   Recent Labs   Lab 03/08/19  0343 03/07/19  0351 03/06/19  1536 03/06/19  0422   WBC 14.5* 14.1* 13.8* 16.0*   HGB 9.0* 7.9* 7.9* 8.3*    217 204 206     Basic Metabolic Panel  Recent Labs   Lab 03/08/19  1056 03/08/19  0343 03/07/19  2153 03/07/19  1650   * 126* 129* 126*   POTASSIUM 4.8 5.1 4.6 4.9   CHLORIDE 97 93* 96 93*   CO2 14* 14* 14* 18*   * 118* 105* 108*   CR 4.07* 4.38* 3.95* 4.08*   * 98 87 86     Liver Function Tests  Recent Labs   Lab 03/08/19  0343 03/07/19  0351 03/06/19  0422 03/05/19  0346   AST 13 12 11 16   ALT 19 18 21 23   ALKPHOS 114 98 100 102   BILITOTAL 0.8 0.8 0.8 1.0   ALBUMIN 3.3* 3.4 3.2* 2.9*     Pancreatic Enzymes  No lab results found in last 7 days.  Coagulation Profile  No lab results found in last 7 days.      5. RADIOLOGY:   Recent Results (from the past 24 hour(s))   XR Chest Port 1 View    Narrative    EXAM: XR CHEST PORT 1 VW  3/8/2019 1:50 AM      HISTORY: Confirm PA catheter position    COMPARISON: Radiograph 3/7/2019    FINDINGS: AP radiograph of the chest. Left IJ [place]-Liz catheter tip  projects over the right pulmonary artery. Stable bilateral chest  tubes. Surgical clips project over the left axilla.    Cardiac silhouette is stable. New mild perihilar opacities and stable  diffuse interstitial opacities. Decreased size of the left  retrocardiac opacity. Slight increase  in size of the hazy right lower  lobe opacity.      Impression    IMPRESSION:   1. [place]-Liz catheter tip projects over the right pulmonary artery.  2. Mild new perihilar opacities and stable diffuse interstitial  opacities, likely indicating pulmonary edema.  3. Decreased size of the left retrocardiac opacity, likely resolving  atelectasis.  4. Slight increase in size of the hazy right lower lobe opacity, which  may indicate infection or atelectasis.    I have personally reviewed the examination and initial interpretation  and I agree with the findings.    RADHA ALY MD       =========================================

## 2019-03-08 NOTE — PLAN OF CARE
Neuro - Intact.  Pt tired, reporting sleeping deprived.  Pain well controlled with PRN oxy.  Up in chair time 2hrs.  Pt walked in hallway once, in room 3 times.      Cardiac - NSR, normotensive, no ectopy.  Pt afebrile.  Pulses 2+.  Mild edema.  Dobutamine at 2.5, Heparin at 950 (therapeutic).  [place] at 55, normal waveforms.  A/V pacing wires capped.    Resp - Room air, dyspnea at exertion and at rest.  Pt shows poor reserve capability for ambulation.  Lung sounds clear over dim.      GI - Abdomen rounded, soft.  Passing gas, BM X 1.  Poor appetite.        - Poor voiding output, maddox placed for close monitoring of I/O's.  Urine clear, yellow.      Skin - Bruising to BUE's.  Bruising to neck.  Chest tube site dressing WDL, as well is AV pacing wire sites.

## 2019-03-08 NOTE — PLAN OF CARE
PT: Cancel, attempted to see pt however pt with other providers then unable to check back, will reschedule.

## 2019-03-08 NOTE — PLAN OF CARE
D/I/A:  Neuro oriented but forgetful and lethargic. Otherwise intact.   Pulm: lungs clear/dim, RA  CV: SR 90, BP /50-60s.  PAP 36-40/16-20, CVP 17-20.    : marginal 10-20ml/hr. CRRT line placed and will be starting therapy next shift.  GI: regular diet, 2L fluid restriction. Very poor oral intake all day, rahat count started.    Skin: generalized bruising, surgical sites CDI.  Gtt's: dobutamine 2.5mg/hr, heparin 650u/hr  Labs: See flowsheet for details, abnormalities unchanged       P: Start CRRT, Q6H ficks. Continue to monitor and treat as ordered. Notify cards 2 with changes/concerns.

## 2019-03-08 NOTE — PROGRESS NOTES
Nephrology Progress Note  March 7, 2019      Everton Larios MRN:[id number] YOB: 1965  Date of Admission:2/17/2019  Primary care provider: Fredrick Wolff  Requesting physician: Yves Rodrigues MD    ASSESSMENT AND RECOMMENDATIONS:   Mr. Larios is a 53 year old male with history of chronic renal insufficiency, EtOH abuse, GIB w/splenic embolization, PAF s/p ablation and cardioversion, NICM c/b cardiogenic shock requiring IABP, s/p OHT (2/24/19) on tacrolimus and MMF, complicated by RV dysfunction per echo and acute oliguric kidney injury.         #Acute oliguric kidney injury  Patient with history of chronic renal insufficiency and cortical scarring of the L kidney per Renal US 03/2016. Baseline Crt 1.1- 1.4 over the last 6 months; however, prior BMPs have demonstrated patient Crt as high as 2.0. He most likely has a baseline level of CKD due to renal artery stenosis (longstanding smoking history, abdominal bruit on physical examination) and possible smaller FRANKLIN episodes from HFrEF exacerbations. Current rise in creatinine started 3/1 at 1.89, and increasing to 4.01 3/7 and oliguria is most likely due to nephrotoxicity associated with tacrolimus (a renal vasoconstrictor), and diuretic use. BUN continues to rise to 101, but no evidence of uremic symptoms on physical exam. The etiology of patient's creatinine rise is multifactorial in nature; however, there is mention in the transplant literature of the interaction between tacrolimus and valganciclovir leading to intra tubular obstruction. The use of PPIs alongside tacrolimus can lead to elevated tacro levels, with omeprazole being the worst offender of the class (Pharmacokinetics and Toxicity of Tacrolimus Early After Heart and Lung Transplantation PARVEZ Keith. Am J Transplant. 2015 Sep;15(9):2301-13. doi: 10.1111/ajt.21839. Epub  2015 Jun 4.).    Renal US notable for congestion of the renal veins bilaterally, suggesting right sided heart failure. No evidence of nephrolithiasis or hydronephrosis. Random urine sodium 9, suggestive of hypovolemic hyponatremia most likely in the setting of third spacing from RV dysfunction.      Patient's mental status continued to worsen 3/8 with BMP notable for worsening uremia, acidosis and rising creatinine. He remains hypervolemic on physical examination. VBG notable for metabolic acidosis w/respiratory compensation 3/8. Patient will need CRRT for fluid removal and ultrafiltration. Will reassess patient's mentation tomorrow.    Immunosuppressants:  MMF (2/27 - date)  Tacrolimus (2/28-date)     Last tacrolimus level: 10.6 (3/7/19)     Diuretics:  Bumex gtt(3/2-3/3)  Lasix gtt (3/3-3/4)     #Electrolytes  Moderate Hyponatremia  Patient's decreasing sodium trend began 2/27, with 1-2 point drop daily and reaching its fam of 126 on 3/6. Etiologies to explain this include diuretic use and the use of nephrotoxic agents. Currently not displaying any neurological symptoms to require immediate sodium repletion at this time.     #Acid base status  Patient's acid base trend shows decreasing bicarb trend coinciding with the onset of FRANKLIN. Will continue to monitor closely alongside of the creatinine trend.     #Volume status  Patient appears clinically euvolemic, yet hemodynamics are indicative of elevated right sided filling pressures consistent with RV dysfunction and moderate tricuspid regurgitation as seen on the recent TTE. PCW ranges from 16-20; current CVP is 20 (3/6/19).      #Acute normocytic anemia  Patient's hemoglobin displays a gradual downward trend since postop day 1, where Hgb was 12.5 and is now 7.9. Unclear the etiology for the gradually decreasing hemoglobin trend, but will follow closely with daily CBC.     Recommendations  1. Start CRRT given patient's mental status change in the setting of  hypervolemia and uremia.  2. Continue to trend CRRT labs q6H     Recommendations were verbally communicated to primary team.     Seen and discussed with Dr. Paul.     Yasmany Witt MD   Internal Medicine, PGY-1  [phone]    SUBJECTIVE:  Nursing notes reviewed. Patient appears obtunded, requiring constant arousal to stay awake. Patient continues to have UOP in the oliguric range.  BUN increased to 118 this AM, concerning for the onset of uremic encephalopathy. He also notes mild worsening of his SOB and fatigue. No fevers, chills, CP, abdominal pain, n/v/d/c.    PAST MEDICAL HISTORY:    Past Medical History:   Diagnosis Date     Alcohol abuse      Pierce's esophagus 10/4/2018     Chronic rhinitis 10/4/2018     Chronic systolic heart failure (H) 10/4/2018     Depression 10/4/2018     Diastolic dysfunction      DJD (degenerative joint disease) - neck 10/4/2018     H/O congenital atrial septal defect (ASD) repair at age 3 10/4/2018     Hypothyroidism due to medication 10/4/2018     ICD, [employer] 2008; gen change 2/2018 10/4/2018     Intermittent asthma without complication 10/4/2018     Nonischemic cardiomyopathy (H) 10/4/2018     On amiodarone therapy 10/4/2018     Paroxysmal atrial fibrillation (H) 10/4/2018     S/P ablation of atrial fibrillation 10/4/2018     Systolic heart failure (H)      Ulcerative colitis (H) 10/4/2018     Ventricular tachycardia (H) 10/4/2018       Past Surgical History:   Procedure Laterality Date     AICD, DUAL CHAMBER       CV INTRA-AORTIC BALLOON N/A 2/18/2019    Procedure: Intra-Aortic Balloon;  Surgeon: Alton Solomon MD;  Location:  [facility]     INSERT INTRAAORTIC BALLOON PUMP Left 2/19/2019    Procedure: Insert left  Subclavian Balloon Pump,  Removal Right femoral arterial balloom pump sheath;  Surgeon: Dewayne House MD;  Location:  OR     INSERT INTRAAORTIC BALLOON PUMP Left 2/21/2019    Procedure: SUBCLAVIAN BALLOON PUMP  PLACEMENT;  Surgeon: Ben White MD;  Location: [facility] OR     TRANSPLANT HEART RECIPIENT N/A 2/24/2019    Procedure: TRANSPLANT HEART RECIPIENT;  Surgeon: Dewayne House MD;  Location: [facility] OR        MEDICATIONS:  PTA Meds  Prior to Admission medications    Medication Sig Last Dose Taking? Auth Provider   albuterol (PROAIR HFA/PROVENTIL HFA/VENTOLIN HFA) 108 (90 Base) MCG/ACT inhaler Inhale 2 puffs into the lungs every 6 hours as needed for shortness of breath / dyspnea or wheezing Past Month at Unknown time Yes    Alpha-D-Galactosidase (BEANO PO) Take 2 tablets by mouth 3 times daily  Past Month at Unknown time Yes Reported, Patient   amiodarone (PACERONE/CODARONE) 200 MG tablet Take 1 tablet (200 mg) by mouth daily Past Month at Unknown time Yes    DULoxetine (CYMBALTA) 20 MG EC capsule Take 20 mg by mouth daily 3/1/2019 at Unknown time Yes Unknown, Entered By History   Ergocalciferol (VITAMIN D2 PO)  Past Month at Unknown time Yes Unknown, Entered By History   fluticasone (FLOVENT HFA) 220 MCG/ACT Inhaler Inhale 2 puffs into the lungs 2 times daily 3/1/2019 at Unknown time Yes Unknown, Entered By History   furosemide (LASIX) 20 MG tablet Take 2 tablets (40 mg) by mouth 2 times daily 2/28/2019 at Unknown time Yes Corinna Donis MD   levothyroxine (SYNTHROID/LEVOTHROID) 100 MCG tablet Take 100 mcg by mouth daily  3/1/2019 at Unknown time Yes Unknown, Entered By History   mesalamine (ASACOL HD) 800 MG EC tablet Take 3 tablets (2,400 mg) by mouth 2 times daily 3/1/2019 at Unknown time Yes    milrinone (PRIMACOR) infusion 200 mcg/mL PREMIX Inject 6.6375 mcg/min into the vein continuous Past Month at Unknown time Yes Luis Carlos Templeton MD   montelukast (SINGULAIR) 10 MG tablet Take 1 tablet (10 mg) by mouth At Bedtime 3/1/2019 at Unknown time Yes    omeprazole (PRILOSEC) 40 MG capsule Take 1 capsule (40 mg) by mouth daily In pm Past Month at Unknown time Yes    ondansetron (ZOFRAN-ODT) 4 MG ODT tab Take 1  tablet (4 mg) by mouth every 8 hours as needed for nausea Past Week at Unknown time Yes    potassium chloride ER (K-DUR/KLOR-CON M) 20 MEQ CR tablet Take 40 mg in the am and 20 mg in the afternoon. 2/28/2019 at Unknown time Yes My Norton, NP   spironolactone (ALDACTONE) 25 MG tablet Take 25 mg by mouth daily Past Month at Unknown time Yes Unknown, Entered By History   tamsulosin (FLOMAX) 0.4 MG capsule Take 1 capsule (0.4 mg) by mouth daily (with dinner) Past Month at Unknown time Yes    warfarin (COUMADIN) 5 MG tablet Take 2.5 mg on Sun/Thurs and 5 mg on all other days of the week Past Month at Unknown time Yes    BETA BLOCKER NOT PRESCRIBED, INTENTIONAL, Beta Blocker not prescribed intentionally due to Recent tx with IV positive inotropic agent  Patient not taking: Reported on 1/30/2019   Luis Carlos Templeton MD      Current Meds    sodium chloride         DULoxetine  20 mg Oral Daily     fluticasone  2 puff Inhalation Q12H     heparin lock flush  5 mL Intravenous Q24H     insulin aspart  1-7 Units Subcutaneous TID AC     insulin aspart  1-5 Units Subcutaneous At Bedtime     levothyroxine  100 mcg Oral Daily     lidocaine  1 patch Transdermal Q24H     lidocaine   Transdermal Q8H     lidocaine   Transdermal Q24h     melatonin  3 mg Oral At Bedtime     mesalamine  2,400 mg Oral BID     montelukast  10 mg Oral At Bedtime     mycophenolate  1,500 mg Oral BID IS     nystatin  500,000 Units Oral 4x Daily     polyethylene glycol  17 g Oral Daily     predniSONE  20 mg Oral BID w/meals     ranitidine  75 mg Oral BID     rosuvastatin  10 mg Oral Daily     senna-docusate  1 tablet Oral BID    Or     senna-docusate  2 tablet Oral BID     sildenafil  20 mg Oral TID     sodium chloride (PF)  3 mL Intracatheter Q8H     tacrolimus  1.5 mg Oral QAM     tacrolimus  2 mg Oral QPM     tamsulosin  0.4 mg Oral Daily     terbutaline  10 mg Oral Q8H     [START ON 3/9/2019] valGANciclovir  450 mg Oral Once per day on Tue Sat      Infusion Meds    DOBUTamine 2.5 mcg/kg/min (03/08/19 0700)     - MEDICATION INSTRUCTIONS -       - MEDICATION INSTRUCTIONS -       BETA BLOCKER NOT PRESCRIBED         ALLERGIES:    Allergies   Allergen Reactions     Hydromorphone Other (See Comments)     Significant Delirium     Lisinopril Other (See Comments)     hypotension     Codeine Nausea       REVIEW OF SYSTEMS:  A comprehensive of systems was negative except as noted above.    SOCIAL HISTORY:   Social History     Socioeconomic History     Marital status: Single     Spouse name: Not on file     Number of children: Not on file     Years of education: Not on file     Highest education level: Not on file   Occupational History     Not on file   Social Needs     Financial resource strain: Not on file     Food insecurity:     Worry: Not on file     Inability: Not on file     Transportation needs:     Medical: Not on file     Non-medical: Not on file   Tobacco Use     Smoking status: Former Smoker     Years: 10.00     Smokeless tobacco: Never Used   Substance and Sexual Activity     Alcohol use: Yes     Alcohol/week: 0.6 oz     Types: 1 Cans of beer per week     Comment: a couple times a week      Drug use: No     Sexual activity: Not on file   Lifestyle     Physical activity:     Days per week: Not on file     Minutes per session: Not on file     Stress: Not on file   Relationships     Social connections:     Talks on phone: Not on file     Gets together: Not on file     Attends Shinto service: Not on file     Active member of club or organization: Not on file     Attends meetings of clubs or organizations: Not on file     Relationship status: Not on file     Intimate partner violence:     Fear of current or ex partner: Not on file     Emotionally abused: Not on file     Physically abused: Not on file     Forced sexual activity: Not on file   Other Topics Concern     Not on file   Social History Narrative     Not on file       FAMILY MEDICAL HISTORY:   No  "family history on file.      PHYSICAL EXAM:   Temp  Av.9  F (36.6  C)  Min: 95.9  F (35.5  C)  Max: 100  F (37.8  C)  Arterial Line BP  Min: 68/56  Max: 182/36  Arterial Line BP 2  Min: 90/54  Max: 107/56  Arterial Line MAP (mmHg)  Av.7 mmHg  Min: 57 mmHg  Max: 123 mmHg Arterial Line Location 2: Right femoral    Pulse  Av.5  Min: 59  Max: 140 Resp  Av.1  Min: 8  Max: 45  FiO2 (%)  Av %  Min: 40 %  Max: 100 %  SpO2  Av.1 %  Min: 88 %  Max: 100 %    CVP (mmHg): 21 mmHg  BP 94/59   Pulse 89   Temp 96  F (35.6  C) (Axillary)   Resp 19   Ht 1.727 m (5' 8\")   Wt 61.7 kg (136 lb 0.4 oz)   SpO2 98%   BMI 20.68 kg/m     Date 19 07 - 19 0659   Shift 1649-4532 1317-3757 5698-7262 24 Hour Total   INTAKE   P.O. 100   100   I.V. 70.4   70.4   Shift Total(mL/kg) 170.4(2.74)   170.4(2.74)   OUTPUT   Chest Tube(mL/kg) 238(3.83)   238(3.83)   Shift Total(mL/kg) 238(3.83)   238(3.83)   Weight (kg) 62.1 62.1 62.1 62.1      Admit Weight: 63.5 kg (140 lb 1.6 oz)     GENERAL APPEARANCE: Patient appears obtunded, difficult to arouse.  EYES: No scleral icterus, pupils equal but reacts sluggishly.  Lymphatics: no cervical or supraclavicular LAD  Pulmonary: lungs clear to auscultation with equal breath sounds bilaterally  CV: regular rhythm, normal rate, no rub   - JVD: Elevated to 18 cm.   - Edema: 1+ pedal edema up to the knees bilaterally  GI: soft, nontender, normal bowel sounds. Bilateral abdominal bruit on auscultation  MS: no evidence of inflammation in joints, no muscle tenderness  SKIN: no rash, warm, dry, no cyanosis  NEURO: face symmetric,  no asterixis     LABS:   CMP  Recent Labs   Lab 19  1056 19  0343 19  2153 19  1650  19  0351  19  1536  19  0422  19  0346   * 126* 129* 126*   < > 127*   < > 128*   < > 126*   < > 127*   POTASSIUM 4.8 5.1 4.6 4.9   < > 4.8   < > 4.6   < > 4.6   < > 4.2   CHLORIDE 97 93* 96 93*   < > 92*   < " > 94   < > 93*   < > 94   CO2 14* 14* 14* 18*   < > 16*   < > 18*   < > 17*   < > 19*   ANIONGAP 16* 20* 19* 15*   < > 18*   < > 15*   < > 17*   < > 14   * 98 87 86   < > 91   < > 95   < > 102*   < > 107*   * 118* 105* 108*   < > 102*   < > 93*   < > 97*   < > 91*   CR 4.07* 4.38* 3.95* 4.08*   < > 3.90*   < > 3.27*   < > 3.25*   < > 2.83*   GFRESTIMATED 15* 14* 16* 15*   < > 16*   < > 20*   < > 20*   < > 24*   GFRESTBLACK 18* 16* 18* 18*   < > 19*   < > 23*   < > 23*   < > 28*   RADAMES 7.5* 8.2* 7.7* 8.2*   < > 8.0*   < > 7.5*   < > 7.9*   < > 7.9*   MAG  --  3.2*  --  3.0*  --  3.0*  --  2.7*  --  3.1*   < > 2.9*   PHOS  --  9.3*  --   --   --  8.4*  --   --   --  7.7*  --  7.1*   PROTTOTAL  --  5.7*  --   --   --  5.5*  --   --   --  5.6*  --  5.6*   ALBUMIN  --  3.3*  --   --   --  3.4  --   --   --  3.2*  --  2.9*   BILITOTAL  --  0.8  --   --   --  0.8  --   --   --  0.8  --  1.0   ALKPHOS  --  114  --   --   --  98  --   --   --  100  --  102   AST  --  13  --   --   --  12  --   --   --  11  --  16   ALT  --  19  --   --   --  18  --   --   --  21  --  23    < > = values in this interval not displayed.     CBC  Recent Labs   Lab 03/08/19  0343 03/07/19  0351 03/06/19  1536 03/06/19  0422   HGB 9.0* 7.9* 7.9* 8.3*   WBC 14.5* 14.1* 13.8* 16.0*   RBC 2.86* 2.66* 2.67* 2.78*   HCT 26.3* 24.6* 24.8* 25.7*   MCV 92 93 93 92   MCH 31.5 29.7 29.6 29.9   MCHC 34.2 32.1 31.9 32.3   RDW 15.1* 14.9 14.8 14.8    217 204 206     INRNo lab results found in last 7 days.  ABG  Recent Labs   Lab 03/08/19  1134 03/08/19  0343 03/07/19 2008 03/07/19  1442   O2PER 21 21 21.0 21      URINE STUDIES  Recent Labs   Lab Test 03/04/19  1303 02/23/19  1921 02/18/19  0957 11/15/18  1000   COLOR Yellow Yellow Yellow Yellow   APPEARANCE Clear Clear Clear Clear   URINEGLC Negative Negative Negative Negative   URINEBILI Negative Negative Negative Negative   URINEKETONE Negative Negative Negative Negative   SG 1.014 1.008  1.008 1.015   UBLD Negative Negative Negative Negative   URINEPH 5.0 6.0 5.5 5.0   PROTEIN Negative Negative Negative Negative   NITRITE Negative Negative Negative Negative   LEUKEST Negative Negative Negative Negative   RBCU 0 <1 <1 <1   WBCU 0 <1 1 1     No lab results found.  PTH  No lab results found.  IRON STUDIES  Recent Labs   Lab Test 11/15/18  0955 10/11/18  1235   IRON  --  63   FEB  --  457*   IRONSAT  --  14*   NATE 1,045*  --        IMAGING:  Renal Ultrasound w/dopplers 3/7/19:                                                                   Impression:     1. Technically difficult study. There is no diastolic flow in the  renal arteries bilaterally with resistive indices of 1.0, with to and  fro flow in the renal veins. Unable to Doppler arcuate arteries.  Findings are likely secondary to venous congestion, with differential  including right heart failure.     2. Moderate ascites.     3. Scarring in the left renal cortex.      I, Abhijeet Paul, saw this patient on 03/08/2019 with Dr. Witt and agree with the findings and plan of care as documented in the note.

## 2019-03-08 NOTE — PLAN OF CARE
0831-6003:  Neuro: Intact. Fatigues easily with activity. Denies pain this shift.  CV: Sinus rhythm, HR 90s, no ectopy. Distal BLE pulses 1+, slight edema noted in both feet. [place] remains at 55cm; pacing wires capped. FICKs checked q6h.  Resp: On RA; dyspnea with exertion. Lung sounds diminished in bases.  GI: Loose BM x1. Poor appetite on regular diet with 2L fluid restriction. BGs ACHS, WNL.  : Apul with poor UOP; given one-time Diuril with little effect. MD aware.  Skin: Significant bruising on BUEs, chest/neck, R groin. Incisions approximated with dermabond.  Lines: [place] at 55cm. R PIV SL.  Drains: Chest tube site x2 clean/dry/intact. To -20cm H2O with serosanguinous drainage, see flowsheets.  Mobility: Up with SBA to commode. Declines assistance with repositioning overnight.  Tests/Procedures: Chest xray completed.  Plan: Continue to monitor hemodynamic status and renal status closely. Encourage activity as tolerated. Notify provider of any changes or concerns.

## 2019-03-08 NOTE — PROGRESS NOTES
Care Coordinator Progress Note    Admission Date/Time:  2/17/2019  Attending MD:  Yves Rodrigues MD    Data  Chart reviewed, discussed with interdisciplinary team.   Patient was admitted for:    Acute on chronic systolic congestive heart failure (H)  Chronic systolic heart failure (H).        Assessment  Pt is s/p heart transplant on 2/24/19.  Pt cont. Requiring ICU level of care.  Pt is with hypervolemic/ worsening mental status.  Nephrology is following.  Per morning report and chart review, pt will need dialysis and plan to have dialysis line placed today.  Attempt to visit pt and family for support.  Pt was resting at time of my visit and no family was in the room.  RNCC will cont to follow plan of care.     Plan  Anticipated Discharge Date:  TBD.  Anticipated Discharge Plan:   TBD.  RNCC will cont to follow plan of care.      Lucía Preston RN, PHN, BSN  4A and 4E/ ICU  Care Coordinator  Phone: [phone]  Pager: [phone]

## 2019-03-08 NOTE — PROGRESS NOTES
ADVANCED HEART FAILURE PROGRESS NOTE    SUBJECTIVE:  No acute events. Reports some chest wall soreness but otherwise no acute complaints. Patient appears more fatigued today.    ROS otherwise negative.    OBJECTIVE:  Vital signs:  Temp: 96.8  F (36  C) Temp  Min: 96.1  F (35.6  C)  Max: 96.8  F (36  C)  Heart Rate: 93 Heart Rate  Min: 89  Max: 93  BP: 102/63 Systolic (24hrs), Av , Min:96 , Max:116   Diastolic (24hrs), Av, Min:60, Max:70    Resp: 12 Resp  Min: 10  Max: 29  SpO2: 98 % SpO2  Min: 89 %  Max: 100 %      [place]: RA  17, PA 36/16, PA sat 64%, CO/CI 5.2/3.0      Intake/Output Summary (Last 24 hours) at 3/8/2019 0637  Last data filed at 3/8/2019 0600  Gross per 24 hour   Intake 1113.4 ml   Output 1005 ml   Net 108.4 ml       Vitals:    19 0400 19 0400 19 0500   Weight: 61.7 kg (136 lb 0.4 oz) 62.1 kg (136 lb 14.5 oz) 61.7 kg (136 lb 0.4 oz)       Physical Exam:  GEN:  Alert, interactive, appears comfortable, NAD.  HEENT:  [place] R internal jugular with saturated bandage  CV:  Regular rate and rhythm, no murmur.   CHEST: Subclavian incision sutured.  Chest tubes.  LUNGS:  On room air, normal work of breathing, Clear to auscultation bilaterally, no wheezes or crackles.   ABD:  Active bowel sounds, soft, non-tender/non-distended.  No rebound/guarding/rigidity.  EXT:  No edema or cyanosis.  Hands/feet warm to touch with good signs of peripheral perfusion.   NEURO:  Alert and oriented, no new focal deficits appreciated.  PSCYH: Normal mood and affect      Medications:    DOBUTamine 2.5 mcg/kg/min (19 07)     HEParin 800 Units/hr (19)     - MEDICATION INSTRUCTIONS -       - MEDICATION INSTRUCTIONS -       BETA BLOCKER NOT PRESCRIBED         Current Facility-Administered Medications   Medication Dose Route Frequency     aspirin  81 mg Oral Daily     DULoxetine  20 mg Oral Daily     fluticasone  2 puff Inhalation Q12H     heparin lock flush  5 mL Intravenous Q24H     insulin  aspart  1-7 Units Subcutaneous TID AC     insulin aspart  1-5 Units Subcutaneous At Bedtime     levothyroxine  100 mcg Oral Daily     lidocaine  1 patch Transdermal Q24H     lidocaine   Transdermal Q8H     lidocaine   Transdermal Q24h     melatonin  3 mg Oral At Bedtime     mesalamine  2,400 mg Oral BID     montelukast  10 mg Oral At Bedtime     mycophenolate  1,500 mg Oral BID IS     nystatin  500,000 Units Oral 4x Daily     polyethylene glycol  17 g Oral Daily     predniSONE  20 mg Oral BID w/meals     ranitidine  75 mg Oral BID     rosuvastatin  10 mg Oral Daily     senna-docusate  1 tablet Oral BID    Or     senna-docusate  2 tablet Oral BID     sildenafil  20 mg Oral TID     sodium chloride (PF)  3 mL Intracatheter Q8H     tacrolimus  1.5 mg Oral QAM     tacrolimus  2 mg Oral QPM     tamsulosin  0.4 mg Oral Daily     terbutaline  10 mg Oral Q8H     [START ON 3/9/2019] valGANciclovir  450 mg Oral Once per day on Tue Sat         Labs:  CMP  Recent Labs   Lab 03/08/19  0343 03/07/19  2153 03/07/19  1650  03/07/19  0351   * 129* 126*   < > 127*   POTASSIUM 5.1 4.6 4.9   < > 4.8   CHLORIDE 93* 96 93*   < > 92*   CO2 14* 14* 18*   < > 16*   * 105* 108*   < > 102*   CR 4.38* 3.95* 4.08*   < > 3.90*   RADAMES 8.2* 7.7* 8.2*   < > 8.0*   MAG 3.2*  --  3.0*  --  3.0*   PHOS 9.3*  --   --   --  8.4*   GLC 98 87 86   < > 91   ALKPHOS 114  --   --   --  98   BILITOTAL 0.8  --   --   --  0.8   AST 13  --   --   --  12   ALT 19  --   --   --  18    < > = values in this interval not displayed.     BLOOD GASNo lab results found in last 7 days.  CBC  Recent Labs   Lab 03/08/19  0343 03/07/19  0351   WBC 14.5* 14.1*   HGB 9.0* 7.9*   HCT 26.3* 24.6*    217     COAGNo lab results found in last 7 days.        ASSESSMENT/PLAN:  Everton Larios is a 54 yo gentleman who underwent orthotopic heart transplant 2/24/19 for non ischemic cardiomyopathy and cardiogenic shock. Post-op complicated by RV dysfunction and  FRANKLIN.     Updates today:  Creatinine rising with minimal response to diuretic challenge overnight. Will likely need dialysis.      #Oliguric renal failure:  Etiology cardiorenal from venous congestion vs. nephrotoxins.  - will likely need dialysis per nephrology  - nephrology consulted, appreciate assistance  - change PPI to H2 blocker  - tac level from 3/7 AM 10.6 (goal low end of 10-12)     #Hyponatremia:  Likely hypervolemic hyponatremia from mild RV failure.  -Daily BMP  -Urine Na     #S/p OHT on 2/24:  Graft function:  - echo 3/1 shows normal LV function, RV is mild to moderately dilated and hypokinetic  - dobutamine at 5 for continue RV and hemodynamic support  - continue sildenafil 20 q8h  - off isuprel, continue terbutaline 10 q8h     Immunosuppression:  - on MMF 1500 BID  - s/p methylpred 125 x 3 doses, continue prednisone 20mg BID, taper by 5 daily with negative biopsies  - NOT Simulect protocol, on tacro 1.5mg in AM and 2mg in PM, following levels  - tac level from 3/7 AM 10.6 (goal low end of 10-12)     Prophylaxis:  - CMV +/+: medium risk, start renal dose Valcyte  - PCP: holding Bactrim for now, pentamadine given 3/3  - Thrush: start nystatin  - GI: on PPI  - CAV: start Crestor, holding ASA given bleeding at line sites     Biopsies:  - First biopsy Monday 3/4, 0R, no AMR  - Second biopsy 3/11     Goretex conduit for persistent left SVC:  - will need anticoagulation, on low intensity heparin      Seen and staffed with Dr. Galan.    Trev Harris MD  Advanced Heart Failure Fellow  [phone]

## 2019-03-08 NOTE — PROCEDURES
PROCEDURES PERFORMED:   Dialysis catheter line placement    PHYSICIANS:  Cardiology Fellow: Trev Harris MD  Attending Physician: Nadja Galan MD    INDICATION:  Everton Larios is a 53 year old male with s/p recent heart transplantation complicated by oliguric renal failure who is likely going to require dialysis. A dialysis catheter line needed to be placed for urgent dialysis.    DESCRIPTION:  1. Consent obtained with discussion of risks.  All questions were answered.  2. Sterile prep and procedure.  3. Location with Sheaths:   Right internal jugular, 9 Fr dialysis catheter  4. Access: Local anesthetic with lidocaine.  A standard 18 guage needle with ultrasound guidance was used to establish vascular access using a modified Seldinger technique.  6. Estimated blood loss: < 5 ml   7. Chest xray ordered for confirmation.    MEDICATIONS:  Heart rate, BP, respiration, oxygen saturation and patient responses were monitored throughout the procedure with the assistance of the RN under my supervision.    COMPLICATIONS:  1. None      The attending cardiologist was immediately available for all critical aspects the procedure.    Trev Harris MD   Cardiology Fellow

## 2019-03-08 NOTE — PROGRESS NOTES
CLINICAL NUTRITION SERVICES - brief note. See 3/4 note for full assessment.    -FEN/GI: RN notes state poor appetite. Previous PO intake fair. Has gelatein supplements ordered BID.     Recommendations/Nutrition Prescription  Recommend start TFs if PO intake < 1200 kcals, 60 g protein (~60% of high end nutritional needs)    Interventions  Collaboration with other providers - Discussed in rounds, notified providers of poor PO/appetite, calorie counts  Medical food supplement therapy - added Boost Breeze supplements  Calorie counts 3/8-3/10      RD will continue to follow.    Amber Lemon, RD, LD, [facility]  CVICU Dietitian  Pager: [phone]

## 2019-03-09 ENCOUNTER — APPOINTMENT (OUTPATIENT)
Dept: GENERAL RADIOLOGY | Facility: CLINIC | Age: 54
DRG: 001 | End: 2019-03-09
Attending: INTERNAL MEDICINE
Payer: COMMERCIAL

## 2019-03-09 ENCOUNTER — APPOINTMENT (OUTPATIENT)
Dept: GENERAL RADIOLOGY | Facility: CLINIC | Age: 54
DRG: 001 | End: 2019-03-09
Attending: PHYSICIAN ASSISTANT
Payer: COMMERCIAL

## 2019-03-09 ENCOUNTER — APPOINTMENT (OUTPATIENT)
Dept: ULTRASOUND IMAGING | Facility: CLINIC | Age: 54
DRG: 001 | End: 2019-03-09
Attending: INTERNAL MEDICINE
Payer: COMMERCIAL

## 2019-03-09 LAB
ABO + RH BLD: NORMAL
ABO + RH BLD: NORMAL
ALBUMIN SERPL-MCNC: 2.7 G/DL (ref 3.4–5)
ALBUMIN SERPL-MCNC: 3 G/DL (ref 3.4–5)
ALP SERPL-CCNC: 105 U/L (ref 40–150)
ALP SERPL-CCNC: 109 U/L (ref 40–150)
ALP SERPL-CCNC: 111 U/L (ref 40–150)
ALP SERPL-CCNC: 112 U/L (ref 40–150)
ALT SERPL W P-5'-P-CCNC: 18 U/L (ref 0–70)
ALT SERPL W P-5'-P-CCNC: 19 U/L (ref 0–70)
ANION GAP SERPL CALCULATED.3IONS-SCNC: 12 MMOL/L (ref 3–14)
ANION GAP SERPL CALCULATED.3IONS-SCNC: 13 MMOL/L (ref 3–14)
ANION GAP SERPL CALCULATED.3IONS-SCNC: 15 MMOL/L (ref 3–14)
ANION GAP SERPL CALCULATED.3IONS-SCNC: 20 MMOL/L (ref 3–14)
AST SERPL W P-5'-P-CCNC: 15 U/L (ref 0–45)
AST SERPL W P-5'-P-CCNC: 15 U/L (ref 0–45)
AST SERPL W P-5'-P-CCNC: 18 U/L (ref 0–45)
AST SERPL W P-5'-P-CCNC: 24 U/L (ref 0–45)
BASE DEFICIT BLDV-SCNC: 10.9 MMOL/L
BASOPHILS # BLD AUTO: 0 10E9/L (ref 0–0.2)
BASOPHILS NFR BLD AUTO: 0 %
BASOPHILS NFR BLD AUTO: 0.1 %
BILIRUB SERPL-MCNC: 0.8 MG/DL (ref 0.2–1.3)
BLD GP AB SCN SERPL QL: NORMAL
BLD PROD TYP BPU: NORMAL
BLD PROD TYP BPU: NORMAL
BLD UNIT ID BPU: 0
BLOOD BANK CMNT PATIENT-IMP: NORMAL
BLOOD PRODUCT CODE: NORMAL
BPU ID: NORMAL
BUN SERPL-MCNC: 109 MG/DL (ref 7–30)
BUN SERPL-MCNC: 67 MG/DL (ref 7–30)
BUN SERPL-MCNC: 75 MG/DL (ref 7–30)
BUN SERPL-MCNC: 92 MG/DL (ref 7–30)
CA-I BLD-MCNC: 4.6 MG/DL (ref 4.4–5.2)
CA-I BLD-MCNC: 4.7 MG/DL (ref 4.4–5.2)
CA-I SERPL ISE-MCNC: 4.1 MG/DL (ref 4.4–5.2)
CA-I SERPL ISE-MCNC: 4.3 MG/DL (ref 4.4–5.2)
CALCIUM SERPL-MCNC: 7.5 MG/DL (ref 8.5–10.1)
CALCIUM SERPL-MCNC: 7.7 MG/DL (ref 8.5–10.1)
CALCIUM SERPL-MCNC: 7.9 MG/DL (ref 8.5–10.1)
CALCIUM SERPL-MCNC: 8.3 MG/DL (ref 8.5–10.1)
CHLORIDE SERPL-SCNC: 101 MMOL/L (ref 94–109)
CHLORIDE SERPL-SCNC: 102 MMOL/L (ref 94–109)
CHLORIDE SERPL-SCNC: 96 MMOL/L (ref 94–109)
CHLORIDE SERPL-SCNC: 99 MMOL/L (ref 94–109)
CO2 SERPL-SCNC: 14 MMOL/L (ref 20–32)
CO2 SERPL-SCNC: 17 MMOL/L (ref 20–32)
CO2 SERPL-SCNC: 18 MMOL/L (ref 20–32)
CO2 SERPL-SCNC: 19 MMOL/L (ref 20–32)
CREAT SERPL-MCNC: 2.78 MG/DL (ref 0.66–1.25)
CREAT SERPL-MCNC: 3.03 MG/DL (ref 0.66–1.25)
CREAT SERPL-MCNC: 3.64 MG/DL (ref 0.66–1.25)
CREAT SERPL-MCNC: 4.39 MG/DL (ref 0.66–1.25)
DIFFERENTIAL METHOD BLD: ABNORMAL
EOSINOPHIL # BLD AUTO: 0 10E9/L (ref 0–0.7)
EOSINOPHIL NFR BLD AUTO: 0 %
ERYTHROCYTE [DISTWIDTH] IN BLOOD BY AUTOMATED COUNT: 15.2 % (ref 10–15)
ERYTHROCYTE [DISTWIDTH] IN BLOOD BY AUTOMATED COUNT: 15.2 % (ref 10–15)
ERYTHROCYTE [DISTWIDTH] IN BLOOD BY AUTOMATED COUNT: 15.3 % (ref 10–15)
GFR SERPL CREATININE-BSD FRML MDRD: 14 ML/MIN/{1.73_M2}
GFR SERPL CREATININE-BSD FRML MDRD: 18 ML/MIN/{1.73_M2}
GFR SERPL CREATININE-BSD FRML MDRD: 22 ML/MIN/{1.73_M2}
GFR SERPL CREATININE-BSD FRML MDRD: 24 ML/MIN/{1.73_M2}
GLUCOSE BLDC GLUCOMTR-MCNC: 109 MG/DL (ref 70–99)
GLUCOSE BLDC GLUCOMTR-MCNC: 131 MG/DL (ref 70–99)
GLUCOSE BLDC GLUCOMTR-MCNC: 60 MG/DL (ref 70–99)
GLUCOSE BLDC GLUCOMTR-MCNC: 71 MG/DL (ref 70–99)
GLUCOSE BLDC GLUCOMTR-MCNC: 79 MG/DL (ref 70–99)
GLUCOSE BLDC GLUCOMTR-MCNC: 89 MG/DL (ref 70–99)
GLUCOSE BLDC GLUCOMTR-MCNC: 93 MG/DL (ref 70–99)
GLUCOSE SERPL-MCNC: 101 MG/DL (ref 70–99)
GLUCOSE SERPL-MCNC: 73 MG/DL (ref 70–99)
GLUCOSE SERPL-MCNC: 80 MG/DL (ref 70–99)
GLUCOSE SERPL-MCNC: 94 MG/DL (ref 70–99)
HCO3 BLDV-SCNC: 15 MMOL/L (ref 21–28)
HCT VFR BLD AUTO: 20.6 % (ref 40–53)
HCT VFR BLD AUTO: 21 % (ref 40–53)
HCT VFR BLD AUTO: 22.2 % (ref 40–53)
HCT VFR BLD AUTO: 23.6 % (ref 40–53)
HCT VFR BLD AUTO: 24.7 % (ref 40–53)
HGB BLD-MCNC: 6.8 G/DL (ref 13.3–17.7)
HGB BLD-MCNC: 6.8 G/DL (ref 13.3–17.7)
HGB BLD-MCNC: 7.2 G/DL (ref 13.3–17.7)
HGB BLD-MCNC: 7.8 G/DL (ref 13.3–17.7)
HGB BLD-MCNC: 8.1 G/DL (ref 13.3–17.7)
IMM GRANULOCYTES # BLD: 0.7 10E9/L (ref 0–0.4)
IMM GRANULOCYTES # BLD: 0.8 10E9/L (ref 0–0.4)
IMM GRANULOCYTES NFR BLD: 4.3 %
IMM GRANULOCYTES NFR BLD: 4.5 %
IMM GRANULOCYTES NFR BLD: 5 %
IMM GRANULOCYTES NFR BLD: 5 %
LDH SERPL L TO P-CCNC: 266 U/L (ref 85–227)
LMWH PPP CHRO-ACNC: 0.12 IU/ML
LMWH PPP CHRO-ACNC: 0.13 IU/ML
LMWH PPP CHRO-ACNC: 0.14 IU/ML
LMWH PPP CHRO-ACNC: 0.49 IU/ML
LMWH PPP CHRO-ACNC: 0.65 IU/ML
LMWH PPP CHRO-ACNC: 0.86 IU/ML
LYMPHOCYTES # BLD AUTO: 0 10E9/L (ref 0.8–5.3)
LYMPHOCYTES # BLD AUTO: 0.3 10E9/L (ref 0.8–5.3)
LYMPHOCYTES # BLD AUTO: 0.3 10E9/L (ref 0.8–5.3)
LYMPHOCYTES # BLD AUTO: 0.4 10E9/L (ref 0.8–5.3)
LYMPHOCYTES # BLD AUTO: 0.4 10E9/L (ref 0.8–5.3)
LYMPHOCYTES NFR BLD AUTO: 0 %
LYMPHOCYTES NFR BLD AUTO: 1.9 %
LYMPHOCYTES NFR BLD AUTO: 2.2 %
LYMPHOCYTES NFR BLD AUTO: 2.3 %
LYMPHOCYTES NFR BLD AUTO: 2.5 %
MAGNESIUM SERPL-MCNC: 2.6 MG/DL (ref 1.6–2.3)
MAGNESIUM SERPL-MCNC: 2.7 MG/DL (ref 1.6–2.3)
MCH RBC QN AUTO: 29.8 PG (ref 26.5–33)
MCH RBC QN AUTO: 30.1 PG (ref 26.5–33)
MCH RBC QN AUTO: 30.3 PG (ref 26.5–33)
MCH RBC QN AUTO: 30.6 PG (ref 26.5–33)
MCH RBC QN AUTO: 30.6 PG (ref 26.5–33)
MCHC RBC AUTO-ENTMCNC: 32.4 G/DL (ref 31.5–36.5)
MCHC RBC AUTO-ENTMCNC: 32.4 G/DL (ref 31.5–36.5)
MCHC RBC AUTO-ENTMCNC: 32.8 G/DL (ref 31.5–36.5)
MCHC RBC AUTO-ENTMCNC: 33 G/DL (ref 31.5–36.5)
MCHC RBC AUTO-ENTMCNC: 33.1 G/DL (ref 31.5–36.5)
MCV RBC AUTO: 92 FL (ref 78–100)
MCV RBC AUTO: 93 FL (ref 78–100)
MONOCYTES # BLD AUTO: 0 10E9/L (ref 0–1.3)
MONOCYTES # BLD AUTO: 0.4 10E9/L (ref 0–1.3)
MONOCYTES # BLD AUTO: 0.7 10E9/L (ref 0–1.3)
MONOCYTES NFR BLD AUTO: 0 %
MONOCYTES NFR BLD AUTO: 2.8 %
MONOCYTES NFR BLD AUTO: 4.1 %
MYELOCYTES # BLD: 0.1 10E9/L
MYELOCYTES NFR BLD MANUAL: 0.9 %
NEUTROPHILS # BLD AUTO: 12.3 10E9/L (ref 1.6–8.3)
NEUTROPHILS # BLD AUTO: 13.4 10E9/L (ref 1.6–8.3)
NEUTROPHILS # BLD AUTO: 14.7 10E9/L (ref 1.6–8.3)
NEUTROPHILS # BLD AUTO: 14.9 10E9/L (ref 1.6–8.3)
NEUTROPHILS # BLD AUTO: 15 10E9/L (ref 1.6–8.3)
NEUTROPHILS NFR BLD AUTO: 88.6 %
NEUTROPHILS NFR BLD AUTO: 89 %
NEUTROPHILS NFR BLD AUTO: 89.4 %
NEUTROPHILS NFR BLD AUTO: 89.8 %
NEUTROPHILS NFR BLD AUTO: 99.1 %
NRBC # BLD AUTO: 0 10*3/UL
NRBC # BLD AUTO: 0 10*3/UL
NRBC # BLD AUTO: 0.1 10*3/UL
NRBC BLD AUTO-RTO: 0 /100
NRBC BLD AUTO-RTO: 0 /100
NRBC BLD AUTO-RTO: 1 /100
NUM BPU REQUESTED: 1
O2/TOTAL GAS SETTING VFR VENT: 21 %
OXYHGB MFR BLDV: 60 %
PCO2 BLDV: 34 MM HG (ref 40–50)
PH BLDV: 7.26 PH (ref 7.32–7.43)
PHOSPHATE SERPL-MCNC: 6.2 MG/DL (ref 2.5–4.5)
PHOSPHATE SERPL-MCNC: 6.6 MG/DL (ref 2.5–4.5)
PHOSPHATE SERPL-MCNC: 7.6 MG/DL (ref 2.5–4.5)
PHOSPHATE SERPL-MCNC: 9 MG/DL (ref 2.5–4.5)
PLATELET # BLD AUTO: 133 10E9/L (ref 150–450)
PLATELET # BLD AUTO: 141 10E9/L (ref 150–450)
PLATELET # BLD AUTO: 143 10E9/L (ref 150–450)
PLATELET # BLD AUTO: 154 10E9/L (ref 150–450)
PLATELET # BLD AUTO: 178 10E9/L (ref 150–450)
PLATELET # BLD EST: ABNORMAL 10*3/UL
PO2 BLDV: 36 MM HG (ref 25–47)
POLYCHROMASIA BLD QL SMEAR: SLIGHT
POTASSIUM SERPL-SCNC: 4.6 MMOL/L (ref 3.4–5.3)
POTASSIUM SERPL-SCNC: 4.7 MMOL/L (ref 3.4–5.3)
POTASSIUM SERPL-SCNC: 4.9 MMOL/L (ref 3.4–5.3)
POTASSIUM SERPL-SCNC: 5 MMOL/L (ref 3.4–5.3)
PROT SERPL-MCNC: 5 G/DL (ref 6.8–8.8)
PROT SERPL-MCNC: 5.1 G/DL (ref 6.8–8.8)
PROT SERPL-MCNC: 5.2 G/DL (ref 6.8–8.8)
PROT SERPL-MCNC: 5.3 G/DL (ref 6.8–8.8)
RBC # BLD AUTO: 2.22 10E12/L (ref 4.4–5.9)
RBC # BLD AUTO: 2.28 10E12/L (ref 4.4–5.9)
RBC # BLD AUTO: 2.39 10E12/L (ref 4.4–5.9)
RBC # BLD AUTO: 2.55 10E12/L (ref 4.4–5.9)
RBC # BLD AUTO: 2.67 10E12/L (ref 4.4–5.9)
RETICS # AUTO: 98 10E9/L (ref 25–95)
RETICS/RBC NFR AUTO: 4.3 % (ref 0.5–2)
SODIUM SERPL-SCNC: 130 MMOL/L (ref 133–144)
SODIUM SERPL-SCNC: 131 MMOL/L (ref 133–144)
SODIUM SERPL-SCNC: 132 MMOL/L (ref 133–144)
SODIUM SERPL-SCNC: 133 MMOL/L (ref 133–144)
SPECIMEN EXP DATE BLD: NORMAL
TACROLIMUS BLD-MCNC: 19.1 UG/L (ref 5–15)
TME LAST DOSE: ABNORMAL H
TRANSFUSION STATUS PATIENT QL: NORMAL
TRANSFUSION STATUS PATIENT QL: NORMAL
TSH SERPL DL<=0.005 MIU/L-ACNC: 1.05 MU/L (ref 0.4–4)
WBC # BLD AUTO: 13.5 10E9/L (ref 4–11)
WBC # BLD AUTO: 13.7 10E9/L (ref 4–11)
WBC # BLD AUTO: 16.4 10E9/L (ref 4–11)
WBC # BLD AUTO: 16.7 10E9/L (ref 4–11)
WBC # BLD AUTO: 17 10E9/L (ref 4–11)

## 2019-03-09 PROCEDURE — 85025 COMPLETE CBC W/AUTO DIFF WBC: CPT | Performed by: GENERAL ACUTE CARE HOSPITAL

## 2019-03-09 PROCEDURE — 25000132 ZZH RX MED GY IP 250 OP 250 PS 637: Performed by: INTERNAL MEDICINE

## 2019-03-09 PROCEDURE — 27211414 ZZ H ADHESIVE SKIN CLOSURE, DERMABOND

## 2019-03-09 PROCEDURE — 36569 INSJ PICC 5 YR+ W/O IMAGING: CPT

## 2019-03-09 PROCEDURE — 86850 RBC ANTIBODY SCREEN: CPT | Performed by: THORACIC SURGERY (CARDIOTHORACIC VASCULAR SURGERY)

## 2019-03-09 PROCEDURE — 25000125 ZZHC RX 250: Performed by: INTERNAL MEDICINE

## 2019-03-09 PROCEDURE — 85520 HEPARIN ASSAY: CPT | Performed by: PHYSICIAN ASSISTANT

## 2019-03-09 PROCEDURE — 99233 SBSQ HOSP IP/OBS HIGH 50: CPT | Mod: GC | Performed by: INTERNAL MEDICINE

## 2019-03-09 PROCEDURE — 85520 HEPARIN ASSAY: CPT | Performed by: INTERNAL MEDICINE

## 2019-03-09 PROCEDURE — 25800030 ZZH RX IP 258 OP 636: Performed by: INTERNAL MEDICINE

## 2019-03-09 PROCEDURE — 25000125 ZZHC RX 250: Performed by: GENERAL ACUTE CARE HOSPITAL

## 2019-03-09 PROCEDURE — 00000146 ZZHCL STATISTIC GLUCOSE BY METER IP

## 2019-03-09 PROCEDURE — 76604 US EXAM CHEST: CPT

## 2019-03-09 PROCEDURE — 25800025 ZZH RX 258: Performed by: STUDENT IN AN ORGANIZED HEALTH CARE EDUCATION/TRAINING PROGRAM

## 2019-03-09 PROCEDURE — 25000131 ZZH RX MED GY IP 250 OP 636 PS 637: Performed by: INTERNAL MEDICINE

## 2019-03-09 PROCEDURE — 85045 AUTOMATED RETICULOCYTE COUNT: CPT | Performed by: INTERNAL MEDICINE

## 2019-03-09 PROCEDURE — 27211393 ZZH DRESSING, STATSEAL DISC

## 2019-03-09 PROCEDURE — P9016 RBC LEUKOCYTES REDUCED: HCPCS | Performed by: THORACIC SURGERY (CARDIOTHORACIC VASCULAR SURGERY)

## 2019-03-09 PROCEDURE — 25000128 H RX IP 250 OP 636: Performed by: INTERNAL MEDICINE

## 2019-03-09 PROCEDURE — 25000132 ZZH RX MED GY IP 250 OP 250 PS 637: Performed by: STUDENT IN AN ORGANIZED HEALTH CARE EDUCATION/TRAINING PROGRAM

## 2019-03-09 PROCEDURE — 40000986 XR CHEST PORT 1 VW

## 2019-03-09 PROCEDURE — 71045 X-RAY EXAM CHEST 1 VIEW: CPT

## 2019-03-09 PROCEDURE — 27211389 ZZ H KIT, 5 FR DL BIOFLO OPEN ENDED PICC

## 2019-03-09 PROCEDURE — 25000132 ZZH RX MED GY IP 250 OP 250 PS 637: Performed by: SURGERY

## 2019-03-09 PROCEDURE — 80197 ASSAY OF TACROLIMUS: CPT | Performed by: INTERNAL MEDICINE

## 2019-03-09 PROCEDURE — 80053 COMPREHEN METABOLIC PANEL: CPT | Performed by: PHYSICIAN ASSISTANT

## 2019-03-09 PROCEDURE — 85025 COMPLETE CBC W/AUTO DIFF WBC: CPT | Performed by: INTERNAL MEDICINE

## 2019-03-09 PROCEDURE — 83735 ASSAY OF MAGNESIUM: CPT | Performed by: GENERAL ACUTE CARE HOSPITAL

## 2019-03-09 PROCEDURE — 25800030 ZZH RX IP 258 OP 636: Performed by: GENERAL ACUTE CARE HOSPITAL

## 2019-03-09 PROCEDURE — 83735 ASSAY OF MAGNESIUM: CPT | Performed by: PHYSICIAN ASSISTANT

## 2019-03-09 PROCEDURE — 40000611 ZZHCL STATISTIC MORPHOLOGY W/INTERP HEMEPATH TC 85060: Performed by: INTERNAL MEDICINE

## 2019-03-09 PROCEDURE — 80053 COMPREHEN METABOLIC PANEL: CPT | Performed by: GENERAL ACUTE CARE HOSPITAL

## 2019-03-09 PROCEDURE — 20000004 ZZH R&B ICU UMMC

## 2019-03-09 PROCEDURE — 90947 DIALYSIS REPEATED EVAL: CPT

## 2019-03-09 PROCEDURE — 40000275 ZZH STATISTIC RCP TIME EA 10 MIN

## 2019-03-09 PROCEDURE — 83615 LACTATE (LD) (LDH) ENZYME: CPT | Performed by: INTERNAL MEDICINE

## 2019-03-09 PROCEDURE — 87040 BLOOD CULTURE FOR BACTERIA: CPT | Performed by: INTERNAL MEDICINE

## 2019-03-09 PROCEDURE — 27211417 ZZ H KIT, VPS RHYTHM STYLET

## 2019-03-09 PROCEDURE — 86901 BLOOD TYPING SEROLOGIC RH(D): CPT | Performed by: THORACIC SURGERY (CARDIOTHORACIC VASCULAR SURGERY)

## 2019-03-09 PROCEDURE — 84100 ASSAY OF PHOSPHORUS: CPT | Performed by: PHYSICIAN ASSISTANT

## 2019-03-09 PROCEDURE — 85025 COMPLETE CBC W/AUTO DIFF WBC: CPT | Performed by: PHYSICIAN ASSISTANT

## 2019-03-09 PROCEDURE — 85520 HEPARIN ASSAY: CPT | Performed by: GENERAL ACUTE CARE HOSPITAL

## 2019-03-09 PROCEDURE — 86923 COMPATIBILITY TEST ELECTRIC: CPT | Performed by: THORACIC SURGERY (CARDIOTHORACIC VASCULAR SURGERY)

## 2019-03-09 PROCEDURE — 5A1D90Z PERFORMANCE OF URINARY FILTRATION, CONTINUOUS, GREATER THAN 18 HOURS PER DAY: ICD-10-PCS | Performed by: INTERNAL MEDICINE

## 2019-03-09 PROCEDURE — 82330 ASSAY OF CALCIUM: CPT | Performed by: GENERAL ACUTE CARE HOSPITAL

## 2019-03-09 PROCEDURE — 25000131 ZZH RX MED GY IP 250 OP 636 PS 637: Performed by: STUDENT IN AN ORGANIZED HEALTH CARE EDUCATION/TRAINING PROGRAM

## 2019-03-09 PROCEDURE — 86900 BLOOD TYPING SEROLOGIC ABO: CPT | Performed by: THORACIC SURGERY (CARDIOTHORACIC VASCULAR SURGERY)

## 2019-03-09 PROCEDURE — 82330 ASSAY OF CALCIUM: CPT | Performed by: PHYSICIAN ASSISTANT

## 2019-03-09 PROCEDURE — 25000132 ZZH RX MED GY IP 250 OP 250 PS 637: Performed by: ANESTHESIOLOGY

## 2019-03-09 PROCEDURE — 99233 SBSQ HOSP IP/OBS HIGH 50: CPT | Mod: GC | Performed by: ANESTHESIOLOGY

## 2019-03-09 PROCEDURE — 84100 ASSAY OF PHOSPHORUS: CPT | Performed by: GENERAL ACUTE CARE HOSPITAL

## 2019-03-09 PROCEDURE — 84443 ASSAY THYROID STIM HORMONE: CPT | Performed by: INTERNAL MEDICINE

## 2019-03-09 PROCEDURE — 40000048 ZZH STATISTIC DAILY SWAN MONITORING

## 2019-03-09 PROCEDURE — 36415 COLL VENOUS BLD VENIPUNCTURE: CPT | Performed by: INTERNAL MEDICINE

## 2019-03-09 PROCEDURE — 82805 BLOOD GASES W/O2 SATURATION: CPT | Performed by: PHYSICIAN ASSISTANT

## 2019-03-09 RX ORDER — HEPARIN SODIUM,PORCINE 10 UNIT/ML
2-5 VIAL (ML) INTRAVENOUS
Status: DISCONTINUED | OUTPATIENT
Start: 2019-03-09 | End: 2019-03-14

## 2019-03-09 RX ORDER — PIPERACILLIN SODIUM, TAZOBACTAM SODIUM 4; .5 G/20ML; G/20ML
4.5 INJECTION, POWDER, LYOPHILIZED, FOR SOLUTION INTRAVENOUS EVERY 6 HOURS
Status: DISCONTINUED | OUTPATIENT
Start: 2019-03-09 | End: 2019-03-13

## 2019-03-09 RX ORDER — MYCOPHENOLATE MOFETIL 500 MG/1
1000 TABLET ORAL
Status: DISCONTINUED | OUTPATIENT
Start: 2019-03-09 | End: 2019-03-11

## 2019-03-09 RX ORDER — TACROLIMUS 0.5 MG/1
0.5 CAPSULE ORAL
Status: DISCONTINUED | OUTPATIENT
Start: 2019-03-10 | End: 2019-03-10

## 2019-03-09 RX ORDER — HEPARIN SODIUM,PORCINE 10 UNIT/ML
5-10 VIAL (ML) INTRAVENOUS
Status: DISCONTINUED | OUTPATIENT
Start: 2019-03-09 | End: 2019-03-14

## 2019-03-09 RX ORDER — PREDNISONE 20 MG/1
20 TABLET ORAL DAILY
Status: DISCONTINUED | OUTPATIENT
Start: 2019-03-09 | End: 2019-03-12

## 2019-03-09 RX ORDER — LIDOCAINE 40 MG/G
CREAM TOPICAL
Status: DISCONTINUED | OUTPATIENT
Start: 2019-03-09 | End: 2019-03-19

## 2019-03-09 RX ORDER — HEPARIN SODIUM,PORCINE 10 UNIT/ML
5-10 VIAL (ML) INTRAVENOUS EVERY 24 HOURS
Status: DISCONTINUED | OUTPATIENT
Start: 2019-03-09 | End: 2019-03-14

## 2019-03-09 RX ADMIN — MESALAMINE 2400 MG: 800 TABLET, DELAYED RELEASE ORAL at 07:56

## 2019-03-09 RX ADMIN — CALCIUM CHLORIDE, MAGNESIUM CHLORIDE, SODIUM CHLORIDE, SODIUM BICARBONATE, POTASSIUM CHLORIDE AND SODIUM PHOSPHATE DIBASIC DIHYDRATE 3.66 ML/KG/HR: 3.68; 3.05; 6.34; 3.09; .314; .187 INJECTION INTRAVENOUS at 23:37

## 2019-03-09 RX ADMIN — Medication 500000 UNITS: at 07:56

## 2019-03-09 RX ADMIN — Medication 500000 UNITS: at 12:14

## 2019-03-09 RX ADMIN — CALCIUM CHLORIDE, MAGNESIUM CHLORIDE, SODIUM CHLORIDE, SODIUM BICARBONATE, POTASSIUM CHLORIDE AND SODIUM PHOSPHATE DIBASIC DIHYDRATE 12.5 ML/KG/HR: 3.68; 3.05; 6.34; 3.09; .314; .187 INJECTION INTRAVENOUS at 16:28

## 2019-03-09 RX ADMIN — CALCIUM CHLORIDE, MAGNESIUM CHLORIDE, SODIUM CHLORIDE, SODIUM BICARBONATE, POTASSIUM CHLORIDE AND SODIUM PHOSPHATE DIBASIC DIHYDRATE 3.66 ML/KG/HR: 3.68; 3.05; 6.34; 3.09; .314; .187 INJECTION INTRAVENOUS at 01:28

## 2019-03-09 RX ADMIN — Medication 500000 UNITS: at 17:14

## 2019-03-09 RX ADMIN — Medication 500000 UNITS: at 20:26

## 2019-03-09 RX ADMIN — DEXTROSE MONOHYDRATE 25 ML: 500 INJECTION PARENTERAL at 08:43

## 2019-03-09 RX ADMIN — PIPERACILLIN AND TAZOBACTAM 4.5 G: 4; .5 INJECTION, POWDER, FOR SOLUTION INTRAVENOUS at 20:50

## 2019-03-09 RX ADMIN — FLUTICASONE PROPIONATE 2 PUFF: 220 AEROSOL, METERED RESPIRATORY (INHALATION) at 20:26

## 2019-03-09 RX ADMIN — SILDENAFIL 20 MG: 20 TABLET ORAL at 07:56

## 2019-03-09 RX ADMIN — MYCOPHENOLATE MOFETIL 1500 MG: 500 TABLET ORAL at 07:56

## 2019-03-09 RX ADMIN — CALCIUM CHLORIDE 1 G: 100 INJECTION, SOLUTION INTRAVENOUS at 11:44

## 2019-03-09 RX ADMIN — HEPARIN SODIUM 600 UNITS/HR: 10000 INJECTION, SOLUTION INTRAVENOUS at 22:04

## 2019-03-09 RX ADMIN — CALCIUM CHLORIDE, MAGNESIUM CHLORIDE, SODIUM CHLORIDE, SODIUM BICARBONATE, POTASSIUM CHLORIDE AND SODIUM PHOSPHATE DIBASIC DIHYDRATE 12.5 ML/KG/HR: 3.68; 3.05; 6.34; 3.09; .314; .187 INJECTION INTRAVENOUS at 23:36

## 2019-03-09 RX ADMIN — TERBUTALINE SULFATE 10 MG: 5 TABLET ORAL at 07:56

## 2019-03-09 RX ADMIN — TACROLIMUS 1.5 MG: 1 CAPSULE ORAL at 07:55

## 2019-03-09 RX ADMIN — DOBUTAMINE 2.5 MCG/KG/MIN: 12.5 INJECTION, SOLUTION INTRAVENOUS at 22:42

## 2019-03-09 RX ADMIN — ROSUVASTATIN CALCIUM 10 MG: 10 TABLET, FILM COATED ORAL at 07:55

## 2019-03-09 RX ADMIN — TAMSULOSIN HYDROCHLORIDE 0.4 MG: 0.4 CAPSULE ORAL at 07:56

## 2019-03-09 RX ADMIN — PIPERACILLIN AND TAZOBACTAM 4.5 G: 4; .5 INJECTION, POWDER, FOR SOLUTION INTRAVENOUS at 07:56

## 2019-03-09 RX ADMIN — SILDENAFIL 20 MG: 20 TABLET ORAL at 20:25

## 2019-03-09 RX ADMIN — TERBUTALINE SULFATE 10 MG: 5 TABLET ORAL at 01:27

## 2019-03-09 RX ADMIN — LEVOTHYROXINE SODIUM 100 MCG: 100 TABLET ORAL at 07:55

## 2019-03-09 RX ADMIN — VASOPRESSIN 0.5 UNITS/HR: 20 INJECTION INTRAVENOUS at 09:02

## 2019-03-09 RX ADMIN — FLUTICASONE PROPIONATE 2 PUFF: 220 AEROSOL, METERED RESPIRATORY (INHALATION) at 07:56

## 2019-03-09 RX ADMIN — CALCIUM CHLORIDE, MAGNESIUM CHLORIDE, SODIUM CHLORIDE, SODIUM BICARBONATE, POTASSIUM CHLORIDE AND SODIUM PHOSPHATE DIBASIC DIHYDRATE 12.5 ML/KG/HR: 3.68; 3.05; 6.34; 3.09; .314; .187 INJECTION INTRAVENOUS at 01:29

## 2019-03-09 RX ADMIN — CALCIUM CHLORIDE, MAGNESIUM CHLORIDE, SODIUM CHLORIDE, SODIUM BICARBONATE, POTASSIUM CHLORIDE AND SODIUM PHOSPHATE DIBASIC DIHYDRATE 12.5 ML/KG/HR: 3.68; 3.05; 6.34; 3.09; .314; .187 INJECTION INTRAVENOUS at 09:05

## 2019-03-09 RX ADMIN — MYCOPHENOLATE MOFETIL 1000 MG: 500 TABLET ORAL at 18:06

## 2019-03-09 RX ADMIN — TERBUTALINE SULFATE 10 MG: 5 TABLET ORAL at 17:14

## 2019-03-09 RX ADMIN — CALCIUM CHLORIDE, MAGNESIUM CHLORIDE, SODIUM CHLORIDE, SODIUM BICARBONATE, POTASSIUM CHLORIDE AND SODIUM PHOSPHATE DIBASIC DIHYDRATE 12.5 ML/KG/HR: 3.68; 3.05; 6.34; 3.09; .314; .187 INJECTION INTRAVENOUS at 01:27

## 2019-03-09 RX ADMIN — LIDOCAINE HYDROCHLORIDE 4 ML: 10 INJECTION, SOLUTION EPIDURAL; INFILTRATION; INTRACAUDAL; PERINEURAL at 16:47

## 2019-03-09 RX ADMIN — RANITIDINE 150 MG: 150 TABLET ORAL at 07:56

## 2019-03-09 RX ADMIN — PREDNISONE 20 MG: 20 TABLET ORAL at 07:56

## 2019-03-09 RX ADMIN — CALCIUM CHLORIDE, MAGNESIUM CHLORIDE, SODIUM CHLORIDE, SODIUM BICARBONATE, POTASSIUM CHLORIDE AND SODIUM PHOSPHATE DIBASIC DIHYDRATE 12.5 ML/KG/HR: 3.68; 3.05; 6.34; 3.09; .314; .187 INJECTION INTRAVENOUS at 16:24

## 2019-03-09 RX ADMIN — ONDANSETRON 4 MG: 4 TABLET, ORALLY DISINTEGRATING ORAL at 20:09

## 2019-03-09 RX ADMIN — CALCIUM CHLORIDE 1 G: 100 INJECTION, SOLUTION INTRAVENOUS at 18:29

## 2019-03-09 RX ADMIN — DULOXETINE 20 MG: 20 CAPSULE, DELAYED RELEASE ORAL at 07:56

## 2019-03-09 RX ADMIN — PIPERACILLIN AND TAZOBACTAM 4.5 G: 4; .5 INJECTION, POWDER, FOR SOLUTION INTRAVENOUS at 15:16

## 2019-03-09 RX ADMIN — VANCOMYCIN HYDROCHLORIDE 1250 MG: 10 INJECTION, POWDER, LYOPHILIZED, FOR SOLUTION INTRAVENOUS at 08:27

## 2019-03-09 RX ADMIN — MELATONIN TAB 3 MG 3 MG: 3 TAB at 20:24

## 2019-03-09 RX ADMIN — MONTELUKAST SODIUM 10 MG: 10 TABLET, COATED ORAL at 22:04

## 2019-03-09 RX ADMIN — SILDENAFIL 20 MG: 20 TABLET ORAL at 15:16

## 2019-03-09 RX ADMIN — PREDNISONE 15 MG: 10 TABLET ORAL at 20:24

## 2019-03-09 RX ADMIN — CALCIUM CHLORIDE, MAGNESIUM CHLORIDE, SODIUM CHLORIDE, SODIUM BICARBONATE, POTASSIUM CHLORIDE AND SODIUM PHOSPHATE DIBASIC DIHYDRATE 12.5 ML/KG/HR: 3.68; 3.05; 6.34; 3.09; .314; .187 INJECTION INTRAVENOUS at 09:00

## 2019-03-09 RX ADMIN — MESALAMINE 2400 MG: 800 TABLET, DELAYED RELEASE ORAL at 20:25

## 2019-03-09 ASSESSMENT — ACTIVITIES OF DAILY LIVING (ADL)
ADLS_ACUITY_SCORE: 13

## 2019-03-09 ASSESSMENT — MIFFLIN-ST. JEOR: SCORE: 1450.5

## 2019-03-09 ASSESSMENT — PAIN DESCRIPTION - DESCRIPTORS: DESCRIPTORS: PATIENT UNABLE TO DESCRIBE

## 2019-03-09 NOTE — PROGRESS NOTES
PICC line placed on left upper arm, right side has ICD. Patient also has right and left internal jugular catheter. This writer was having a had hard time passing triple lumen catheter pass through the internal jugular. internal jugular catheter is blocking the PICC catheter. Inserted a 5F double lumen catheter and was able to pass the catheter.

## 2019-03-09 NOTE — PROGRESS NOTES
CRRT RESTART NOTE    Reason for Restart:  recirculation      Patient s Vascular Access: R internal jugular Catheter                   Placement Confirmed:  YES  Manufacture:  [email or web address]  Model:  Keegan  Length/Trinidadian Size:  20 cm/ 12 Fr  Flush Volume:  A/V 1.4 mL    DATA:  Procedure:  CVVHDF  Start Time:  0030  Machine#:  11  Filter:  M150  Blood Flow:   180 mL/min  Pre-Replacement Solution:  Phoxillum BK 4/2.5  Post-Replacement Solution:  Phoxillum BK 4/2.5  Dialysate Solution:  Phoxillum BK 4/2.5  Pre-Replacement Solution Rate:  700 mL/hr  Post-Replacement Solution Rate:  200 mL/hr  Dialysate Flow Rate:  700 mL/hr  Patient Removal Rate:  0 mL/hr  Anticoagulation Type and Rate:  NA    ASSESSMENT:  How Patient Tolerated Restart:  Initial hypotension and lethargy  Vital Signs:  HR 87, MAP 77, RR 16, SPO2 98%, T 94 ax  Initial Pressures:  Access:  -69  Filter:  181  Return:  34  TMP:  92  Change in Filter Pressure:  128    INTERVENTIONS:  Blood warmer. Blood flow rate initiated at 100 ml/h d/t hypotension and altered mental status when restarting CRRT. Blood flow rate slowly increased to ordered 180 ml/h.       PLAN:  Continue fluid removal as tolerated per goals of therapy. Check circuit daily and change circuit q72h and prn. Please contact CRRT resource RN at [phone] with any questions/concerns.

## 2019-03-09 NOTE — CONSULTS
[facility], [place]      Neurology Stroke Code    Patient Name: Everton Larios  : 1965 MRN#: [id number]    STROKE DATA     Stroke Code:  Time called:  2019  Time patient seen:  2019  Onset of symptoms:  2019  Last known normal (pt's baseline):  2019  Head CT not obtained  Stroke Code de-escalated at 2019 after discussion with fellow due to symptoms not likely caused by stroke.      TPA treatment:  Not given due to stroke mimic: encephalopathy..    National Institutes of Health Stroke Scale (at presentation)  NIHSS done at:  time patient seen      Score    Level of consciousness:  (1)   Not alert; arousable w/ minor stim to obey/answer/respond    LOC questions:  (0)   Answers both questions correctly    LOC commands:  (0)   Performs both tasks correctly    Best gaze:  (0)   Normal    Visual:  (0)   No visual loss    Facial palsy:  (0)   Normal symmetrical movements    Motor arm (left):  (0)   No drift    Motor arm (right):  (0)   No drift    Motor leg (left):  (0)   No drift    Motor leg (right):  (0)   No drift    Limb ataxia:  (0)   Absent    Sensory:  (0)   Normal- no sensory loss    Best language:  (0)   Normal- no aphasia    Dysarthria:  (0)   Normal    Extinction and inattention:  (0)   No abnormality        NIHSS Total Score:  1           ASSESSMENT & RECOMMENDATONS   This patient is a 53-year-old man with history of alcohol abuse, renal disease, paroxysmal A. fib status post ablation and cardioversion and an ICM who received a heart transplant on 19 on tacrolimus and mycophenolate, who had a spell of altered consciousness shortly after initiation of hemodialysis this evening.  About 5 minutes after starting hemodialysis, the patient sat up and reportedly flailed his arms and looked up to the left.  Hemodialysis was stopped.  Per nursing, there was no rhythmic activity.  He then laid back down, and seemed  altered for several minutes.  Blood pressure was normal, but slightly soft around systolics of 110 during that time.  By the time I arrived, the patient was mildly encephalopathic but easy to arouse and had a nonfocal neurologic exam.  At that time, I was not as concerned about stroke, and more suspicious of fluid shifts during the initiation of hemodialysis as the etiology of this spell.  Of course, seizure is on the differential, but there was no history of rhythmic movements, and at present we do not have any reason to suspect he is at risk for seizure.  He does have mild hyponatremia that has been slowly corrected over the last few days.     Stroke code was then de-escalated, and no further imaging was obtained.  On further reassessment, the patient was even more clear than he was on initial assessment.  His NIH stroke scale was then 0.  Nursing team was encouraged to call if the patient experiences any other alterations of consciousness, abnormal movements or concern for eye deviation.  We will monitor this patient closely and see him again in the morning.    # Spell of altered consciousness, likely due to fluid shifts/HD  - No stroke related imaging necessary  - Monitor closely for signs of seizure (tonic eye deviation, rhythmic movements, etc.)  - Optimize sodium slowly, as you are    The patient will be managed by the CV surgery team team and  followed by the Stroke Consult service    The patient was discussed with the Fellow, Dr. Epps.  The staff is Dr. Matute.    Juliane Cottrell DO

## 2019-03-09 NOTE — PROGRESS NOTES
CV ICU PROGRESS NOTE  March 9, 2019      CO-MORBIDITIES:   Chronic systolic heart failure (H)  (primary encounter diagnosis)  Acute on chronic systolic congestive heart failure (H)    ASSESSMENT: Everton Larios is a 53 year old male with PMH etoh abuse, hypothyroidism, intermittent asthma, ulcerative colitis, Depression, Chronic HFrEF, NICM admitted with cardiogenic shock requiring subclavian balloon pump now s/p OHT 2/24/19 with Piyush House and Christopher.      TODAY'S PROGRESS:   - Vasopressin for pressor support   - Barbara removed, art line to be exchanged per cards  - Repeat blood cultures today, send c diff  - Start vanco and zosyn for presumed sepsis  - Transfuse 1 u pRBC for Hgb 6.8  - Continue CRRT for fluid removal  - Continue dobutamine 2.5 for RV support      PLAN:  Neuro/ pain/ sedation:  - Monitor neurological status. Notify the MD for any acute changes in exam.  - Tylenol and oxycodone - hold narcotics for altered mental status  #Lethargic   - Worsened on 3/8, potentially related to uremia vs sepsis  - Head CT 3/9 negative     #Depression  - PTA cymbalta 20mg  - Melatonin for sleep     Pulmonary care:   - Doing well on room air  - Supp oxygen to maintain SpO2 >92%  - Encourage IS and deep breathing  - Chest tubes to suction.     #Intermittent asthma  - PTA Montelukast ordered    Cardiovascular: HR goal >95. Echo 3/5 with good LV function, mild decreased RV function (moderately dilated).  - Monitor hemodynamic status.   - MAP >65. HR goal > 95.  - Dobutamine 2.5 mcg/kg/min for RV support  - Sildenafil 20 q8h   - Terbutaline 10 mg q8h  - Continue statin.   - No aspirin per cards.   - Pacing wires capped.     GI care:   - Regular diet  - Senna, miralax and suppository for bowel regimen; MoM and miralax prn     #Intraperitoneal free air (CT obtained 2/26)   - Small peritoneal defect made during redo operation, closed with stitches intra-op- likely source of free air. Resolved.    #Pierce's  esophagus  #Ulcerative colitis  -PTA Mesalamine     Fluids/ Electrolytes/ Nutrition:   - TKO for IV fluid hydration  - No indication for parenteral nutrition.  - Replace electrolytes as needed.  #Hyponatremia  - Asymptomatic/improving on CRRT, no replacement for now, monitor sodium q6h    Renal/ Fluid Balance:   #FRANKLIN, oliguric.    - Etiology likely cardiorenal from venous congestion vs nephrotoxicity from multiple medications  - CRRT started today (3/9) to attempt fluid removal.   - Will continue to monitor intake and output.  - PTA Tamsulosin      Endocrine:    # Stress hyperglycemia  - sliding scale insulin as needed  # hypothyroidism  - Home levothyroxine      ID/ Antibiotics:  #Leukocytosis  - Pan cultured 3/4 for leukocytosis- NGTD.   - Afebrile. WBC trending up. Appearing more encephalopathic on exam  - Repeat blood cultures, send c diff, remove lin, replace a-line, and start vanco/zosyn empirically 3/9  - Perioperative antibiotics vanc/zosyn x 3 days- completed.  - Valcyte for CMV prophylaxis; holding bactrim for PCP prophylaxis. Pentamidine given on 3/4.   - Nystatin for thrush     Heme:     #Bypass of persistent left SVC to R atrial appendage with [place] graft. Will require lifelong anti-coagulation for graft  - Hgb goal >7. Daily CBC.  - Hgb 6.8 today, will transfuse 1 u pRBC.   - Low intensity heparin gtt    Prophylaxis:    - Mechanical prophylaxis for DVT  - Heparin as above  - Protonix qd     Lines/ tubes/ drains:  - RIJ HD line, Arterial line, L pleural chest tubes x2      Disposition:  - CV ICU.     Patient seen, findings and plan discussed with CV ICU staff, Dr. Henderson.     Leyla Bowling MD  General Surgery PGY-3  ====================================    SUBJECTIVE:   Still appears lethargic/somnolent   Head CT obtained overnight- negative  Hypotensive with CRRT, vasopressin started    OBJECTIVE:   1. VITAL SIGNS:   Temp:  [94  F (34.4  C)-97.8  F (36.6  C)] 97.7  F (36.5  C)  Pulse:  [89-90]  90  Heart Rate:  [86-99] 98  Resp:  [8-25] 12  BP: ()/(38-73) 105/65  SpO2:  [96 %-99 %] 96 %  Resp: 12      2. INTAKE/ OUTPUT:   I/O last 3 completed shifts:  In: 1182.32 [P.O.:640; I.V.:534.32; Other:8]  Out: 703 [Urine:276; Other:37; Chest Tube:390]    3. PHYSICAL EXAMINATION:     General: not easily arousable, intermittently nods to questions, easily fatigued   Neuro: moves all four extremities spontaneously  Resp: CTAB on room air, diminished at bases  CV: Regular rate, regular rhythm, serosanguinous output from L pleural chest tube   Abdomen: Soft, mildly distended, non-tender  Incisions: c/d/i, no erythema or drainage  Extremities: warm and well perfused, + peripheral edema    4. INVESTIGATIONS:   Arterial Blood Gases   No lab results found in last 7 days.  Complete Blood Count   Recent Labs   Lab 03/09/19  1148 03/09/19  0955 03/09/19 0400 03/08/19 2147   WBC 16.4* 17.0* 16.7* 14.8*   HGB 6.8* 6.8* 7.2* 7.8*   * 154 178 224     Basic Metabolic Panel  Recent Labs   Lab 03/09/19  0955 03/09/19  0400 03/08/19 2147 03/08/19  1623   * 130* 126* 129*   POTASSIUM 4.9 5.0 5.3 4.8   CHLORIDE 99 96 93* 99   CO2 17* 14* 14* 15*   BUN 92* 109* 124* 111*   CR 3.64* 4.39* 4.75* 4.22*   GLC 94 80 92 89     Liver Function Tests  Recent Labs   Lab 03/09/19  0955 03/09/19  0400 03/08/19 2147 03/08/19  0343   AST 15 15 14 13   ALT 18 18 20 19   ALKPHOS 105 109 119 114   BILITOTAL 0.8 0.8 0.8 0.8   ALBUMIN 2.7* 3.0* 3.2* 3.3*     Pancreatic Enzymes  No lab results found in last 7 days.  Coagulation Profile  No lab results found in last 7 days.      5. RADIOLOGY:   Recent Results (from the past 24 hour(s))   XR Chest Port 1 View    Narrative    EXAM: XR CHEST PORT 1 VW  3/8/2019 6:59 PM     HISTORY:  right IJ dialysis line placement      COMPARISON:  Chest radiographs from same day, 3/7/2019, and 3/6/2019    FINDINGS: Single supine AP view of the chest. Postsurgical changes of  heart transplant including  intact median sternotomy wires and  mediastinal surgical clips. Interval placement of right IJ central  venous catheter with tip near the superior cavoatrial junction. Left  IJ Let[place]-Liz catheter with tip in the right main pulmonary artery.  Stable position of bilateral basilar oriented chest tubes. Additional  lines projecting over the chest are presumed external to the patient.  The pulmonary vasculature is indistinct with cephalization. Trace  right apical pneumothorax is not appreciated on this supine view. No  significant pleural effusion. Patchy bilateral interstitial opacities.  Linear left retrocardiac basilar opacities. No discrete consolidation.  Upper abdomen is collimated out of view.      Impression    IMPRESSION:   1. Interval placement of right IJ central venous catheter with tip in  the low SVC.  2. Stable position of left IJ Letcher-Liz catheter with tip in the right  main pulmonary artery and bilateral basilar oriented chest tubes.  3. Mild pulmonary edema and left lower lobe atelectasis.    I have personally reviewed the examination and initial interpretation  and I agree with the findings.    LAUREN RICHARD MD   CT Head w/o Contrast    Narrative    CT HEAD W/O CONTRAST 3/9/2019 12:12 AM    Provided History: Altered level of consciousness (LOC), unexplained    Comparison: CT 11/17/2018.    Technique: Using multidetector thin collimation helical acquisition  technique, axial, coronal and sagittal CT images from the skull base  to the vertex were obtained without intravenous contrast.     Findings:    No intracranial hemorrhage, mass effect, or midline shift. The  ventricles are proportionate to the cerebral sulci. The gray to white  matter differentiation of the cerebral hemispheres is preserved. The  basal cisterns are patent. Mild leukoaraiosis and mild generalized  parenchyma volume loss. Stable encephalomalacia in the right  cerebellum consistent with a chronic infarct.    The visualized paranasal  sinuses are clear. The mastoid air cells are  clear.       Impression    Impression: No acute intracranial pathology.    I have personally reviewed the examination and initial interpretation  and I agree with the findings.    MAIN DAMON MD   XR Chest Port 1 View    Narrative    EXAM: XR CHEST PORT 1 VW  3/9/2019 2:49 AM      HISTORY: Lines and tubes.    COMPARISON: Radiograph 3/8/2019    FINDINGS: AP radiograph of the chest. Left IJ [place]-Liz catheter tip  projects over the pulmonary outflow tract. Right IJ central venous  catheter tip projects over the cavoatrial junction. Bilateral chest  tubes are stable. Postsurgical changes of a heart transplant. Stable  bilateral chest tubes.    Cardiac silhouette is unchanged. No pneumothorax. Patchy basilar  opacities, unchanged. Improved interstitial opacities.       Impression    IMPRESSION:   1. [place]-Liz catheter tip projects over the right pulmonary artery.  2. Stable chest tubes. No pneumothorax.  3. Unchanged basilar atelectasis and decreased pulmonary edema.    I have personally reviewed the examination and initial interpretation  and I agree with the findings.    MADISON PEREZ MD       =========================================

## 2019-03-09 NOTE — PHARMACY-VANCOMYCIN DOSING SERVICE
Pharmacy Vancomycin Initial Note  Date of Service 2019  Patient's  1965  53 year old, male    Indication: Sepsis    Current estimated CrCl = Estimated Creatinine Clearance: 17.2 mL/min (A) (based on SCr of 4.39 mg/dL (H)).    Creatinine for last 3 days  3/6/2019: 12:48 PM Creatinine 3.06 mg/dL;  3:36 PM Creatinine 3.27 mg/dL;  9:54 PM Creatinine 3.48 mg/dL  3/7/2019:  3:51 AM Creatinine 3.90 mg/dL;  9:10 AM Creatinine 4.01 mg/dL;  4:50 PM Creatinine 4.08 mg/dL;  9:53 PM Creatinine 3.95 mg/dL  3/8/2019:  3:43 AM Creatinine 4.38 mg/dL; 10:56 AM Creatinine 4.07 mg/dL;  4:23 PM Creatinine 4.22 mg/dL;  9:47 PM Creatinine 4.75 mg/dL  3/9/2019:  4:00 AM Creatinine 4.39 mg/dL    Recent Vancomycin Level(s) for last 3 days  No results found for requested labs within last 72 hours.      Vancomycin IV Administrations (past 72 hours)      No vancomycin orders with administrations in past 72 hours.                Nephrotoxins and other renal medications (From now, onward)    Start     Dose/Rate Route Frequency Ordered Stop    19 0745  vancomycin 1250 mg in 0.9% NaCl 250 mL intermittent infusion 1,250 mg      1,250 mg  over 90 Minutes Intravenous EVERY 24 HOURS 19 0732      19 0715  vasopressin (VASOSTRICT) 40 Units in D5W 40 mL infusion      0.5-4 Units/hr  0.5-4 mL/hr  Intravenous CONTINUOUS 19 0706      19 0715  piperacillin-tazobactam (ZOSYN) 4.5 g vial to attach to  mL bag      4.5 g  over 30 Minutes Intravenous EVERY 6 HOURS 19 0714      19 1800  tacrolimus (GENERIC EQUIVALENT) capsule 2 mg      2 mg Oral EVERY EVENING. 19 0707      19 1200  DOBUTamine (DOBUTREX) 1,000 mg in D5W 250 mL infusion (adult max conc)      2.5 mcg/kg/min × 54.7 kg (Dosing Weight)  2.1 mL/hr  Intravenous CONTINUOUS 19 1001      19 0800  tacrolimus (GENERIC EQUIVALENT) capsule 1.5 mg      1.5 mg Oral EVERY MORNING. 19 0707            Contrast Orders - past  72 hours (72h ago, onward)    None                Plan:  1.  Start vancomycin  1250 mg (19.5 mg/kg) IV q24h.   2.  Goal Trough Level: 15-20 mg/L   3.  Pharmacy will check trough levels as appropriate in 2-4 days, pending dialysis plan.    4. Serum creatinine levels will be ordered daily for the first week of therapy and at least twice weekly for subsequent weeks.    5. Pollard method utilized to dose vancomycin therapy: Method 2    Maxwell Sutton, MaribethD

## 2019-03-09 NOTE — PROGRESS NOTES
CRRT INITIATION NOTE     Consent for CRRT Completed:  YES  Patient s Vascular Access: RIJ Catheter              Placement Confirmed: YES  Manufacture:  [employer]  Model:  Keegan  Length/Pashto Size:  20 cm/12 Fr  Flush Volume:  A/V 1.4 mL     DATA:  Procedure:  CVVHDF  Start Time:  2128  Machine#:  5  Filter:  M150  Blood Flow:  180  mL/min  Pre-Replacement Solution:  Phoxillum BK 4/2.5  Post-Replacement Solution:  Phoxillum BK 4/2.5  Dialysate Solution:  Phoxillum BK 4/2.5  Pre-Replacement Solution Rate:  700 mL/hr  Post-Replacement Solution Rate:  200 mL/hr  Dialysate Flow Rate:  700 mL/hr   Patient Removal Rate:  0 mL/hr  Anticoagulation Type and Rate:  NA     ASSESSMENT:  How Patient Tolerated Initiation: approx 1-2 minutes after initiation, pt became unresponsive with leftward gaze, BP dropped to 79/40. MD notified and stroke code called.  Vital Signs:  Temp 96.1 ax    HR 89    /61 (75) mmHg    RR 10    SpO2 98%  Initial Pressures:  Access: -65  Filter:  56  Return:  30  TMP:  34  Change in Filter Pressure:  6        INTERVENTIONS:  CRRT initiation. Lines reversed 2/2 negative access alarm. *Stopped CRRT and recirculated at 2130 2/2 episode mentioned above.      PLAN:  Fluid removal goal 0-50 mL/hr keeping MAP>65. Continue plan of care. Contact CRRT resource RN at [phone] with any questions or concerns.

## 2019-03-09 NOTE — PROGRESS NOTES
CRRT STATUS NOTE    DATA:  Time:  4:36 PM  Pressures WNL:  YES  Filter Status:  WDL    Problems Reported/Alarms Noted:  none    Supplies Present:  YES    ASSESSMENT:  Patient Net Fluid Balance Since Midnight:+ 599 ml    Vital Signs:  Temp: 97.2  F (36.2  C) Temp src: Oral BP: 118/63 Pulse: 90 Heart Rate: 94 Resp: 10 SpO2: 100 % O2 Device: None (Room air)      Labs:    Lab Results   Component Value Date     03/09/2019      Lab Results   Component Value Date    POTASSIUM 4.9 03/09/2019     Lab Results   Component Value Date    CHLORIDE 99 03/09/2019     Lab Results   Component Value Date    ICAW 4.6 03/09/2019     Lab Results   Component Value Date    CO2 17 03/09/2019     Lab Results   Component Value Date    BUN 92 03/09/2019     Lab Results   Component Value Date    CR 3.64 03/09/2019     Lab Results   Component Value Date    GLC 94 03/09/2019     Lab Results   Component Value Date    MAG 2.6 03/09/2019     Lab Results   Component Value Date    PHOS 7.6 03/09/2019     Goals of Therapy:  0-50 ml/hour net negative without increased pressor requirements with goal of 1 litre net negative/ 24 hours    INTERVENTIONS:   Continue CRRT as ordered     PLAN:  Continue CRRT per plan of care. Contact CRRT resource RN [phone] with questions/concerns.

## 2019-03-09 NOTE — PROVIDER NOTIFICATION
Within one minute of starting CRRT, pt went unresponsive, had some rhythmic arm movement, and a left/upward gaze. His BP decreased to 79/40, resp's appeared labored, HR, SpO2 unchanged. CRRT stopped. Within approx 30 seconds, pt started to cough and attempt to clear phlegm from throat. He did not respond to RN for at least a minute. Stroke code called and primary team notified. He began to wake up and interact slightly with RN after about three minutes. BG WNL. Labs sent. Neurology resident de-escalated stroke code and attributed episode to likely be r/t fluid shift with start of CRRT. Pt returned to baseline lethargic mental status within ten minutes of episode.

## 2019-03-09 NOTE — PROGRESS NOTES
.Calorie Count  Intake recorded for: 3/8  Total Kcals: 125 Total Protein: 11g  Kcals from Hospital Food: 125   Protein: 11g  Kcals from Outside Food (average):0 Protein: 0g  # Meals Recorded: less than 25% omelet  # Supplements Recorded: less than 25% Gelatin plus

## 2019-03-09 NOTE — PLAN OF CARE
OT: Pt not medically appropriate in AM 2/2 BPs OT will check back in PM as appropriate/available or reschedule for 3/10/19

## 2019-03-09 NOTE — PROGRESS NOTES
CRRT INITIATION NOTE    Consent for CRRT Completed:  YES  Patient s Vascular Access: RIJ Catheter              Placement Confirmed: YES  Manufacture:  [employer]  Model:  Keegan  Length/Slovak Size:  20 cm/12 Fr  Flush Volume:  A/V 1.4 mL    DATA:  Procedure:  CVVHDF  Start Time:  2019  Machine#:  4  Filter:  M150  Blood Flow:  180  mL/min  Pre-Replacement Solution:  Phoxillum BK 4/2.5  Post-Replacement Solution:  Phoxillum BK 4/2.5  Dialysate Solution:  Phoxillum BK 4/2.5  Pre-Replacement Solution Rate:  700 mL/hr  Post-Replacement Solution Rate:  200 mL/hr  Dialysate Flow Rate:  700 mL/hr   Patient Removal Rate:  0 mL/hr  Anticoagulation Type and Rate:  NA    ASSESSMENT:  How Patient Tolerated Initiation: tolerated well  Vital Signs:  Temp 96.1 ax    HR 87    BP 97/59 (68) mmHg    RR 10    SpO2 99%  Initial Pressures:  Access:  -50  Filter:  55  Return:  28  TMP:  40  Change in Filter Pressure:  3      INTERVENTIONS:  CRRT initiation. Lines reversed 2/2 negative access alarm.     PLAN:  Fluid removal goal 0-50 mL/hr keeping MAP>65. Continue plan of care. Contact CRRT resource RN at [phone] with any questions or concerns.

## 2019-03-09 NOTE — PHARMACY
Pharmacy Stroke Code Response  Pharmacist responded as part of the Stroke Code Team activation to patient care area: 4AB.  The Stroke Team determined that the patient was not a candidate for IV alteplase therapy and the pharmacy team was dismissed at 21:38.       Cecilia Figueroa, PharmD

## 2019-03-09 NOTE — PROGRESS NOTES
CRRT STATUS NOTE    DATA:  Time:  6:37 AM  Pressures WNL:  YES  Filter Status:  WDL    Problems Reported/Alarms Noted:  none    Supplies Present:  YES    ASSESSMENT:  Patient Net Fluid Balance:  3/8: +400 mL; 3/9 since MN: +40 mL  Vital Signs:  T 94-96.7 ax, HR 86-93, MAP 45-79, RR 8-16, SPO2 98-99%  Labs:  Na 130, K 5, Crt 4.39, iCa 4.2 (replaced)- 4.6, WBC 16.7, Hgb 7.2 HepXa 0.49  Goals of Therapy:  Net neg 0-50 mL/h, keep MAP>65, goal net neg 1L/ 24h, unable to meet goals d/t HOTN    INTERVENTIONS:   Lines reversed d/t high negative access pressure alarms    PLAN:  Continue fluid removal as tolerated per goals of therapy. Check circuit daily and change circuit q72h and prn. Please contact CRRT resource RN at [phone] with any questions/concerns.

## 2019-03-09 NOTE — PROGRESS NOTES
CRRT RESTART NOTE    Reason for Restart:  Filter clotted      Patient s Vascular Access: R IJCatheter                   Placement Confirmed:  YES  Manufacture:  [employer]  Model:  Keegan  Length/German Size:  20 cm/12 Fr  Flush Volume:  A/V 1.4 mL    DATA:  Procedure:  CVVHDF  Start Time:  2250  Machine#:  11  Filter:  M150  Blood Flow:  180 mL/min  Pre-Replacement Solution:  Phoxillum BK 4/2.5  Post-Replacement Solution:  Phoxillum BK 4/2.5  Dialysate Solution:  Phoxillum BK 4/2.5  Pre-Replacement Solution Rate:  700 mL/hr  Post-Replacement Solution Rate:  200 mL/hr  Dialysate Flow Rate:  700 mL/hr  Patient Removal Rate:  0 mL/hr  Anticoagulation Type and Rate:  NA    ASSESSMENT:  How Patient Tolerated Restart:  Hypotension and lethargy upon restart  Vital Signs:  HR 90, MAP 65, RR 18, SPO2 98%, T 95.9 ax  Initial Pressures:  Access:  -62  Filter:  97  Return:  49  TMP:  45  Change in Filter Pressure:  18    INTERVENTIONS:  Blood flow rate decreased to 100 ml/h d/t hypotension and altered mental status when initiating CRRT. Blood flow rate slowly increased to ordered 180 ml/h.     PLAN:  Continue fluid removal as tolerated per goals of therapy. Check circuit daily and change circuit q72h and prn. Please contact CRRT resource RN at [phone] with any questions/concerns.

## 2019-03-09 NOTE — PROGRESS NOTES
ADVANCED HEART FAILURE PROGRESS NOTE    SUBJECTIVE:  Overnight patient became more somnolent and confused and a code stroke was called. CT head showed no acute changes and altered mental status was attributed to encephalopathy from uremia. This morning patient remains somnolent and disoriented. Cultures drawn and patient started on empiric antibiotic coverage. Also mildly hypotensive, started on vasopressin. Throughout the day patient's mental status improved and more alert and oriented. Also BP improved and vaso weaned down.     ROS otherwise negative.    OBJECTIVE:  Vital signs:  Temp: 96.7  F (35.9  C) Temp  Min: 94  F (34.4  C)  Max: 97.5  F (36.4  C)  Heart Rate: 93 Heart Rate  Min: 86  Max: 94  BP: 92/51 Systolic (24hrs), Av , Min:70 , Max:110   Diastolic (24hrs), Av, Min:38, Max:67    Resp: 16 Resp  Min: 8  Max: 23  SpO2: 98 % SpO2  Min: 97 %  Max: 99 %          Intake/Output Summary (Last 24 hours) at 3/9/2019 0655  Last data filed at 3/9/2019 0600  Gross per 24 hour   Intake 1152.32 ml   Output 703 ml   Net 449.32 ml       Vitals:    19 0400 19 0500 19 0200   Weight: 62.1 kg (136 lb 14.5 oz) 61.7 kg (136 lb 0.4 oz) 64.1 kg (141 lb 5 oz)       Physical Exam:  GEN:  Somnolent  CV:  Regular rate and rhythm, no murmur.   CHEST: Subclavian incision sutured.  Chest tubes.  LUNGS:  On room air, normal work of breathing, Clear to auscultation bilaterally, no wheezes or crackles.   ABD:  Active bowel sounds, soft, non-tender/non-distended.  No rebound/guarding/rigidity.  EXT:  No edema or cyanosis.  Hands/feet warm to touch with good signs of peripheral perfusion.   NEURO: Somnolent but arousable, disoriented       Medications:    CRRT replacement solution 12.5 mL/kg/hr (19 0127)     DOBUTamine 2.5 mcg/kg/min (19 06)     HEParin 800 Units/hr (03/09/19 0600)     - MEDICATION INSTRUCTIONS -       - MEDICATION INSTRUCTIONS -       - MEDICATION INSTRUCTIONS -       CRRT  replacement solution 3.656 mL/kg/hr (03/09/19 0128)     CRRT replacement solution 12.5 mL/kg/hr (03/09/19 0129)     BETA BLOCKER NOT PRESCRIBED         Current Facility-Administered Medications   Medication Dose Route Frequency     DULoxetine  20 mg Oral Daily     fluticasone  2 puff Inhalation Q12H     heparin lock flush  5 mL Intravenous Q24H     insulin aspart  1-7 Units Subcutaneous TID AC     insulin aspart  1-5 Units Subcutaneous At Bedtime     levothyroxine  100 mcg Oral Daily     lidocaine  1 patch Transdermal Q24H     lidocaine   Transdermal Q8H     lidocaine   Transdermal Q24h     melatonin  3 mg Oral At Bedtime     mesalamine  2,400 mg Oral BID     montelukast  10 mg Oral At Bedtime     mycophenolate  1,500 mg Oral BID IS     nystatin  500,000 Units Oral 4x Daily     polyethylene glycol  17 g Oral Daily     predniSONE  20 mg Oral BID w/meals     ranitidine  150 mg Oral Daily     rosuvastatin  10 mg Oral Daily     senna-docusate  1 tablet Oral BID    Or     senna-docusate  2 tablet Oral BID     sildenafil  20 mg Oral TID     sodium chloride (PF)  3 mL Intracatheter Q8H     tacrolimus  1.5 mg Oral QAM     tacrolimus  2 mg Oral QPM     tamsulosin  0.4 mg Oral Daily     terbutaline  10 mg Oral Q8H     valGANciclovir  450 mg Oral Every Other Day         Labs:  CMP  Recent Labs   Lab 03/09/19  0400 03/08/19  2147   * 126*   POTASSIUM 5.0 5.3   CHLORIDE 96 93*   CO2 14* 14*   * 124*   CR 4.39* 4.75*   RADAMES 7.9* 7.6*   MAG 2.7* 2.9*   PHOS 9.0* 9.3*   GLC 80 92   ALKPHOS 109 119   BILITOTAL 0.8 0.8   AST 15 14   ALT 18 20     BLOOD GASNo lab results found in last 7 days.  CBC  Recent Labs   Lab 03/09/19  0400 03/08/19 2147   WBC 16.7* 14.8*   HGB 7.2* 7.8*   HCT 22.2* 24.5*    224     COAGNo lab results found in last 7 days.      ASSESSMENT/PLAN:  Everton Larios is a 52 yo gentleman who underwent orthotopic heart transplant 2/24/19 for non ischemic cardiomyopathy and cardiogenic shock. Post-op  complicated by RV dysfunction and FRANKLIN.     Updates today:  - started empiric antibiotic coverage for possible sepsis  - continue CRRT for oliguric renal failure with volume removal as tolerated  - transfuse 1u PRBC for Hgb 6.8 this morning  - holding tacrolimus for elevated level  - decreased MMF dose to 1000 BID     #Oliguric renal failure:  Etiology cardiorenal from venous congestion vs. nephrotoxins.  - now on CRRT, pulling fluid as tolerated  - nephrology consulted, appreciate assistance  - change PPI to H2 blocker     #Hyponatremia:  Likely hypervolemic hyponatremia from mild RV failure.  -Daily BMP  -Urine Na    #Sepsis  - rising WBC with low BP today  - cultures drawn, pending  - start vancomycin and zosyn (3/9 - )  - US of right chest wall swelling  - lines removed as able     #S/p OHT on 2/24:  Graft function:  - echo 3/1 shows normal LV function, RV is mild to moderately dilated and hypokinetic  - dobutamine at 5 for continue RV and hemodynamic support  - continue sildenafil 20 q8h  - off isuprel, continue terbutaline 10 q8h     Immunosuppression:  - on MMF decreased to 1000 BID given possible sepsis  - s/p methylpred 125 x 3 doses, continue prednisone 20mg BID, taper by 5 daily with negative biopsies  - NOT Simulect protocol, level elevated at 19 this morning, holding dose this evening     Prophylaxis:  - CMV +/+: medium risk, start renal dose Valcyte  - PCP: holding Bactrim for now, pentamadine given 3/3  - Thrush: start nystatin  - GI: on PPI  - CAV: start Crestor, holding ASA given bleeding at line sites     Biopsies:  - First biopsy Monday 3/4, 0R, no AMR  - Second biopsy 3/11     Goretex conduit for persistent left SVC:  - will need anticoagulation, on low intensity heparin        Seen and staffed with Dr. Galan.      Trev Harris MD  Advanced Heart Failure Fellow  [phone]

## 2019-03-09 NOTE — PLAN OF CARE
D: admitted 2/17 w/worsening heart failure; 2/24 heart transplant; 3/8 CRRT initiated.     A/I:   Neuro: precautionary head CT done at 2345 due to repeat episode of non-responsiveness/AMS after CRRT restart at 2300. Tolerated 0030 restart with a gentle titration up of blood floow rate, however at 2am upon restart after filter clotted, responded once again with non-responsiveness/upward gaze/spastic movements of upper extremities/and hypotension. Ongoing lethargy/somnolence since this episode, but does wake to voice and follow commands. Continue to note an ongoing tremor with intermittent muscle jerking while asleep.     CV: HR 88-93. CVP 14. PA 34/18. See flowsheets for JOSEPHINE calculation data.   BPs do not tolerate fluid shifts associated with CRRT blood flow rates; became hypotensive with MAPs to 45 at lowest. Has struggled to maintain map of >65 this morning, thus far has responded to gradual increase in blood flow rate to goal of 180, and gentle repositioning, however since 0430 MAPs are now trending 55-60; CVTS MD notified.    Pulm: room air. Clear to crackles/fine crackles/diminished in bases.   ID: Temp ~94 axillary at midnight; warmed to 96.7 with eulalio hugger on.   GI: episode of nausea with frequent belching last evening during a lengthy reposition, declined intervention at the time, no further episodes overnight. Hypoactive bowel sounds. No BMs.  : UOP via maddox 3-12mL/hr. CRRT goal net neg 0-50. Unable to meet this goal 2/2 hypotension.  Lines: R IJ CRRT access. L SWAN. PIV x1.  Gtts: dobutamine 2.5 mcg/kg/min. Heparin 800 units/hr. TKO 5mL/hr.  Skin: incisions intact/ELEONORA. Sacral mepilex covering bruising over bony prominence.    Drains: two chest tubes to one atrium: 10-40mL/hr serosanguinous output.   Labs: lytes slightly improved. Hgb drop to 7.2 from 7.8 (MD aware). WBC trend up. Ionized calcium improved following replacement last evening.     P:    -10a recheck at 0745 following 0145 rate change.    -Q6h FICKs/ Q6h CRRT labs.  -Work with PT/OT  -continue to closely monitor and provide critical cares.

## 2019-03-10 ENCOUNTER — APPOINTMENT (OUTPATIENT)
Dept: OCCUPATIONAL THERAPY | Facility: CLINIC | Age: 54
DRG: 001 | End: 2019-03-10
Attending: INTERNAL MEDICINE
Payer: COMMERCIAL

## 2019-03-10 ENCOUNTER — APPOINTMENT (OUTPATIENT)
Dept: PHYSICAL THERAPY | Facility: CLINIC | Age: 54
DRG: 001 | End: 2019-03-10
Attending: INTERNAL MEDICINE
Payer: COMMERCIAL

## 2019-03-10 LAB
ALBUMIN SERPL-MCNC: 2.6 G/DL (ref 3.4–5)
ALBUMIN SERPL-MCNC: 2.6 G/DL (ref 3.4–5)
ALBUMIN SERPL-MCNC: 2.7 G/DL (ref 3.4–5)
ALBUMIN SERPL-MCNC: 2.8 G/DL (ref 3.4–5)
ALP SERPL-CCNC: 104 U/L (ref 40–150)
ALP SERPL-CCNC: 108 U/L (ref 40–150)
ALT SERPL W P-5'-P-CCNC: 19 U/L (ref 0–70)
ALT SERPL W P-5'-P-CCNC: 20 U/L (ref 0–70)
ALT SERPL W P-5'-P-CCNC: 21 U/L (ref 0–70)
ALT SERPL W P-5'-P-CCNC: 21 U/L (ref 0–70)
ANION GAP SERPL CALCULATED.3IONS-SCNC: 12 MMOL/L (ref 3–14)
ANION GAP SERPL CALCULATED.3IONS-SCNC: 13 MMOL/L (ref 3–14)
ANION GAP SERPL CALCULATED.3IONS-SCNC: 13 MMOL/L (ref 3–14)
ANION GAP SERPL CALCULATED.3IONS-SCNC: 15 MMOL/L (ref 3–14)
ANISOCYTOSIS BLD QL SMEAR: SLIGHT
ANISOCYTOSIS BLD QL SMEAR: SLIGHT
AST SERPL W P-5'-P-CCNC: 22 U/L (ref 0–45)
AST SERPL W P-5'-P-CCNC: 26 U/L (ref 0–45)
AST SERPL W P-5'-P-CCNC: 29 U/L (ref 0–45)
AST SERPL W P-5'-P-CCNC: 30 U/L (ref 0–45)
BACTERIA SPEC CULT: NO GROWTH
BACTERIA SPEC CULT: NO GROWTH
BASE DEFICIT BLDV-SCNC: 5.7 MMOL/L
BASOPHILS # BLD AUTO: 0 10E9/L (ref 0–0.2)
BASOPHILS NFR BLD AUTO: 0 %
BILIRUB SERPL-MCNC: 0.8 MG/DL (ref 0.2–1.3)
BILIRUB SERPL-MCNC: 0.9 MG/DL (ref 0.2–1.3)
BILIRUB SERPL-MCNC: 1 MG/DL (ref 0.2–1.3)
BILIRUB SERPL-MCNC: 1 MG/DL (ref 0.2–1.3)
BUN SERPL-MCNC: 42 MG/DL (ref 7–30)
BUN SERPL-MCNC: 46 MG/DL (ref 7–30)
BUN SERPL-MCNC: 50 MG/DL (ref 7–30)
BUN SERPL-MCNC: 58 MG/DL (ref 7–30)
BURR CELLS BLD QL SMEAR: SLIGHT
BURR CELLS BLD QL SMEAR: SLIGHT
C DIFF TOX B STL QL: NEGATIVE
CA-I SERPL ISE-MCNC: 4.2 MG/DL (ref 4.4–5.2)
CA-I SERPL ISE-MCNC: 4.6 MG/DL (ref 4.4–5.2)
CALCIUM SERPL-MCNC: 8 MG/DL (ref 8.5–10.1)
CALCIUM SERPL-MCNC: 8 MG/DL (ref 8.5–10.1)
CALCIUM SERPL-MCNC: 8.1 MG/DL (ref 8.5–10.1)
CALCIUM SERPL-MCNC: 8.6 MG/DL (ref 8.5–10.1)
CHLORIDE SERPL-SCNC: 102 MMOL/L (ref 94–109)
CHLORIDE SERPL-SCNC: 103 MMOL/L (ref 94–109)
CHLORIDE SERPL-SCNC: 103 MMOL/L (ref 94–109)
CHLORIDE SERPL-SCNC: 105 MMOL/L (ref 94–109)
CO2 SERPL-SCNC: 17 MMOL/L (ref 20–32)
CO2 SERPL-SCNC: 17 MMOL/L (ref 20–32)
CO2 SERPL-SCNC: 18 MMOL/L (ref 20–32)
CO2 SERPL-SCNC: 18 MMOL/L (ref 20–32)
CORTIS SERPL-MCNC: 19.9 UG/DL (ref 4–22)
CREAT SERPL-MCNC: 1.91 MG/DL (ref 0.66–1.25)
CREAT SERPL-MCNC: 2.04 MG/DL (ref 0.66–1.25)
CREAT SERPL-MCNC: 2.28 MG/DL (ref 0.66–1.25)
CREAT SERPL-MCNC: 2.61 MG/DL (ref 0.66–1.25)
DIFFERENTIAL METHOD BLD: ABNORMAL
EOSINOPHIL # BLD AUTO: 0 10E9/L (ref 0–0.7)
EOSINOPHIL NFR BLD AUTO: 0 %
ERYTHROCYTE [DISTWIDTH] IN BLOOD BY AUTOMATED COUNT: 15.2 % (ref 10–15)
ERYTHROCYTE [DISTWIDTH] IN BLOOD BY AUTOMATED COUNT: 15.7 % (ref 10–15)
ERYTHROCYTE [DISTWIDTH] IN BLOOD BY AUTOMATED COUNT: 15.9 % (ref 10–15)
ERYTHROCYTE [DISTWIDTH] IN BLOOD BY AUTOMATED COUNT: 15.9 % (ref 10–15)
GFR SERPL CREATININE-BSD FRML MDRD: 26 ML/MIN/{1.73_M2}
GFR SERPL CREATININE-BSD FRML MDRD: 31 ML/MIN/{1.73_M2}
GFR SERPL CREATININE-BSD FRML MDRD: 35 ML/MIN/{1.73_M2}
GFR SERPL CREATININE-BSD FRML MDRD: 38 ML/MIN/{1.73_M2}
GLUCOSE BLDC GLUCOMTR-MCNC: 119 MG/DL (ref 70–99)
GLUCOSE BLDC GLUCOMTR-MCNC: 75 MG/DL (ref 70–99)
GLUCOSE BLDC GLUCOMTR-MCNC: 79 MG/DL (ref 70–99)
GLUCOSE BLDC GLUCOMTR-MCNC: 80 MG/DL (ref 70–99)
GLUCOSE BLDC GLUCOMTR-MCNC: 87 MG/DL (ref 70–99)
GLUCOSE BLDC GLUCOMTR-MCNC: 90 MG/DL (ref 70–99)
GLUCOSE SERPL-MCNC: 74 MG/DL (ref 70–99)
GLUCOSE SERPL-MCNC: 81 MG/DL (ref 70–99)
GLUCOSE SERPL-MCNC: 82 MG/DL (ref 70–99)
GLUCOSE SERPL-MCNC: 88 MG/DL (ref 70–99)
HCO3 BLDV-SCNC: 20 MMOL/L (ref 21–28)
HCT VFR BLD AUTO: 22.9 % (ref 40–53)
HCT VFR BLD AUTO: 23.7 % (ref 40–53)
HCT VFR BLD AUTO: 23.7 % (ref 40–53)
HCT VFR BLD AUTO: 25.3 % (ref 40–53)
HGB BLD-MCNC: 7.6 G/DL (ref 13.3–17.7)
HGB BLD-MCNC: 7.7 G/DL (ref 13.3–17.7)
HGB BLD-MCNC: 7.7 G/DL (ref 13.3–17.7)
HGB BLD-MCNC: 8.2 G/DL (ref 13.3–17.7)
LDH SERPL L TO P-CCNC: 276 U/L (ref 85–227)
LMWH PPP CHRO-ACNC: 0.2 IU/ML
LYMPHOCYTES # BLD AUTO: 0 10E9/L (ref 0.8–5.3)
LYMPHOCYTES # BLD AUTO: 0 10E9/L (ref 0.8–5.3)
LYMPHOCYTES # BLD AUTO: 0.1 10E9/L (ref 0.8–5.3)
LYMPHOCYTES # BLD AUTO: 0.2 10E9/L (ref 0.8–5.3)
LYMPHOCYTES NFR BLD AUTO: 0 %
LYMPHOCYTES NFR BLD AUTO: 0 %
LYMPHOCYTES NFR BLD AUTO: 0.9 %
LYMPHOCYTES NFR BLD AUTO: 1.8 %
Lab: NORMAL
Lab: NORMAL
MAGNESIUM SERPL-MCNC: 2.6 MG/DL (ref 1.6–2.3)
MAGNESIUM SERPL-MCNC: 2.6 MG/DL (ref 1.6–2.3)
MAGNESIUM SERPL-MCNC: 2.7 MG/DL (ref 1.6–2.3)
MAGNESIUM SERPL-MCNC: 2.7 MG/DL (ref 1.6–2.3)
MCH RBC QN AUTO: 30.1 PG (ref 26.5–33)
MCH RBC QN AUTO: 30.2 PG (ref 26.5–33)
MCH RBC QN AUTO: 30.3 PG (ref 26.5–33)
MCH RBC QN AUTO: 30.5 PG (ref 26.5–33)
MCHC RBC AUTO-ENTMCNC: 32.4 G/DL (ref 31.5–36.5)
MCHC RBC AUTO-ENTMCNC: 32.5 G/DL (ref 31.5–36.5)
MCHC RBC AUTO-ENTMCNC: 32.5 G/DL (ref 31.5–36.5)
MCHC RBC AUTO-ENTMCNC: 33.2 G/DL (ref 31.5–36.5)
MCV RBC AUTO: 92 FL (ref 78–100)
MCV RBC AUTO: 93 FL (ref 78–100)
MONOCYTES # BLD AUTO: 0 10E9/L (ref 0–1.3)
MONOCYTES # BLD AUTO: 0.1 10E9/L (ref 0–1.3)
MONOCYTES # BLD AUTO: 0.1 10E9/L (ref 0–1.3)
MONOCYTES # BLD AUTO: 0.2 10E9/L (ref 0–1.3)
MONOCYTES NFR BLD AUTO: 0 %
MONOCYTES NFR BLD AUTO: 0.9 %
MONOCYTES NFR BLD AUTO: 0.9 %
MONOCYTES NFR BLD AUTO: 1.8 %
NEUTROPHILS # BLD AUTO: 11.5 10E9/L (ref 1.6–8.3)
NEUTROPHILS # BLD AUTO: 12 10E9/L (ref 1.6–8.3)
NEUTROPHILS # BLD AUTO: 13.1 10E9/L (ref 1.6–8.3)
NEUTROPHILS # BLD AUTO: 13.8 10E9/L (ref 1.6–8.3)
NEUTROPHILS NFR BLD AUTO: 100 %
NEUTROPHILS NFR BLD AUTO: 96.4 %
NEUTROPHILS NFR BLD AUTO: 98.2 %
NEUTROPHILS NFR BLD AUTO: 99.1 %
NRBC # BLD AUTO: 0.1 10*3/UL
NRBC # BLD AUTO: 0.3 10*3/UL
NRBC BLD AUTO-RTO: 1 /100
NRBC BLD AUTO-RTO: 3 /100
O2/TOTAL GAS SETTING VFR VENT: 21 %
OXYHGB MFR BLDV: 54 %
PCO2 BLDV: 39 MM HG (ref 40–50)
PH BLDV: 7.32 PH (ref 7.32–7.43)
PHOSPHATE SERPL-MCNC: 5.4 MG/DL (ref 2.5–4.5)
PHOSPHATE SERPL-MCNC: 5.5 MG/DL (ref 2.5–4.5)
PHOSPHATE SERPL-MCNC: 5.6 MG/DL (ref 2.5–4.5)
PHOSPHATE SERPL-MCNC: 5.8 MG/DL (ref 2.5–4.5)
PLATELET # BLD AUTO: 130 10E9/L (ref 150–450)
PLATELET # BLD AUTO: 131 10E9/L (ref 150–450)
PLATELET # BLD AUTO: 131 10E9/L (ref 150–450)
PLATELET # BLD AUTO: 133 10E9/L (ref 150–450)
PLATELET # BLD EST: ABNORMAL 10*3/UL
PO2 BLDV: 34 MM HG (ref 25–47)
POIKILOCYTOSIS BLD QL SMEAR: SLIGHT
POIKILOCYTOSIS BLD QL SMEAR: SLIGHT
POLYCHROMASIA BLD QL SMEAR: SLIGHT
POTASSIUM SERPL-SCNC: 4.5 MMOL/L (ref 3.4–5.3)
POTASSIUM SERPL-SCNC: 4.6 MMOL/L (ref 3.4–5.3)
PROT SERPL-MCNC: 5 G/DL (ref 6.8–8.8)
PROT SERPL-MCNC: 5.2 G/DL (ref 6.8–8.8)
PROT SERPL-MCNC: 5.2 G/DL (ref 6.8–8.8)
PROT SERPL-MCNC: 5.4 G/DL (ref 6.8–8.8)
RBC # BLD AUTO: 2.49 10E12/L (ref 4.4–5.9)
RBC # BLD AUTO: 2.55 10E12/L (ref 4.4–5.9)
RBC # BLD AUTO: 2.56 10E12/L (ref 4.4–5.9)
RBC # BLD AUTO: 2.71 10E12/L (ref 4.4–5.9)
RBC MORPH BLD: NORMAL
RBC MORPH BLD: NORMAL
SODIUM SERPL-SCNC: 133 MMOL/L (ref 133–144)
SODIUM SERPL-SCNC: 134 MMOL/L (ref 133–144)
SODIUM SERPL-SCNC: 135 MMOL/L (ref 133–144)
SODIUM SERPL-SCNC: 135 MMOL/L (ref 133–144)
SPECIMEN SOURCE: NORMAL
TACROLIMUS BLD-MCNC: 19.7 UG/L (ref 5–15)
TME LAST DOSE: ABNORMAL H
WBC # BLD AUTO: 11.5 10E9/L (ref 4–11)
WBC # BLD AUTO: 12.1 10E9/L (ref 4–11)
WBC # BLD AUTO: 13.6 10E9/L (ref 4–11)
WBC # BLD AUTO: 14.1 10E9/L (ref 4–11)

## 2019-03-10 PROCEDURE — 25800030 ZZH RX IP 258 OP 636: Performed by: INTERNAL MEDICINE

## 2019-03-10 PROCEDURE — 25000131 ZZH RX MED GY IP 250 OP 636 PS 637: Performed by: INTERNAL MEDICINE

## 2019-03-10 PROCEDURE — 84100 ASSAY OF PHOSPHORUS: CPT | Performed by: PHYSICIAN ASSISTANT

## 2019-03-10 PROCEDURE — 85025 COMPLETE CBC W/AUTO DIFF WBC: CPT | Performed by: PHYSICIAN ASSISTANT

## 2019-03-10 PROCEDURE — 00000146 ZZHCL STATISTIC GLUCOSE BY METER IP

## 2019-03-10 PROCEDURE — 85520 HEPARIN ASSAY: CPT | Performed by: INTERNAL MEDICINE

## 2019-03-10 PROCEDURE — 25000132 ZZH RX MED GY IP 250 OP 250 PS 637: Performed by: STUDENT IN AN ORGANIZED HEALTH CARE EDUCATION/TRAINING PROGRAM

## 2019-03-10 PROCEDURE — 85025 COMPLETE CBC W/AUTO DIFF WBC: CPT | Performed by: GENERAL ACUTE CARE HOSPITAL

## 2019-03-10 PROCEDURE — 99233 SBSQ HOSP IP/OBS HIGH 50: CPT | Mod: GC | Performed by: INTERNAL MEDICINE

## 2019-03-10 PROCEDURE — 25000128 H RX IP 250 OP 636: Performed by: INTERNAL MEDICINE

## 2019-03-10 PROCEDURE — 82533 TOTAL CORTISOL: CPT | Performed by: INTERNAL MEDICINE

## 2019-03-10 PROCEDURE — 82330 ASSAY OF CALCIUM: CPT | Performed by: PHYSICIAN ASSISTANT

## 2019-03-10 PROCEDURE — 97110 THERAPEUTIC EXERCISES: CPT | Mod: GP | Performed by: PHYSICAL THERAPIST

## 2019-03-10 PROCEDURE — 97530 THERAPEUTIC ACTIVITIES: CPT | Mod: GP | Performed by: PHYSICAL THERAPIST

## 2019-03-10 PROCEDURE — 25000132 ZZH RX MED GY IP 250 OP 250 PS 637: Performed by: INTERNAL MEDICINE

## 2019-03-10 PROCEDURE — 82330 ASSAY OF CALCIUM: CPT | Performed by: GENERAL ACUTE CARE HOSPITAL

## 2019-03-10 PROCEDURE — 90947 DIALYSIS REPEATED EVAL: CPT

## 2019-03-10 PROCEDURE — 25000125 ZZHC RX 250: Performed by: GENERAL ACUTE CARE HOSPITAL

## 2019-03-10 PROCEDURE — 83735 ASSAY OF MAGNESIUM: CPT | Performed by: PHYSICIAN ASSISTANT

## 2019-03-10 PROCEDURE — 97535 SELF CARE MNGMENT TRAINING: CPT | Mod: GO | Performed by: OCCUPATIONAL THERAPIST

## 2019-03-10 PROCEDURE — 87493 C DIFF AMPLIFIED PROBE: CPT | Performed by: STUDENT IN AN ORGANIZED HEALTH CARE EDUCATION/TRAINING PROGRAM

## 2019-03-10 PROCEDURE — 80197 ASSAY OF TACROLIMUS: CPT | Performed by: INTERNAL MEDICINE

## 2019-03-10 PROCEDURE — 25800030 ZZH RX IP 258 OP 636: Performed by: GENERAL ACUTE CARE HOSPITAL

## 2019-03-10 PROCEDURE — 20000004 ZZH R&B ICU UMMC

## 2019-03-10 PROCEDURE — 80053 COMPREHEN METABOLIC PANEL: CPT | Performed by: GENERAL ACUTE CARE HOSPITAL

## 2019-03-10 PROCEDURE — 97530 THERAPEUTIC ACTIVITIES: CPT | Mod: GO | Performed by: OCCUPATIONAL THERAPIST

## 2019-03-10 PROCEDURE — 83615 LACTATE (LD) (LDH) ENZYME: CPT | Performed by: PHYSICIAN ASSISTANT

## 2019-03-10 PROCEDURE — 99232 SBSQ HOSP IP/OBS MODERATE 35: CPT | Mod: GC | Performed by: ANESTHESIOLOGY

## 2019-03-10 PROCEDURE — 84100 ASSAY OF PHOSPHORUS: CPT | Performed by: GENERAL ACUTE CARE HOSPITAL

## 2019-03-10 PROCEDURE — 25000125 ZZHC RX 250: Performed by: INTERNAL MEDICINE

## 2019-03-10 PROCEDURE — 25000132 ZZH RX MED GY IP 250 OP 250 PS 637: Performed by: SURGERY

## 2019-03-10 PROCEDURE — 83735 ASSAY OF MAGNESIUM: CPT | Performed by: GENERAL ACUTE CARE HOSPITAL

## 2019-03-10 PROCEDURE — 82805 BLOOD GASES W/O2 SATURATION: CPT | Performed by: ANESTHESIOLOGY

## 2019-03-10 PROCEDURE — 80053 COMPREHEN METABOLIC PANEL: CPT | Performed by: PHYSICIAN ASSISTANT

## 2019-03-10 RX ADMIN — MESALAMINE 2400 MG: 800 TABLET, DELAYED RELEASE ORAL at 19:56

## 2019-03-10 RX ADMIN — FLUTICASONE PROPIONATE 2 PUFF: 220 AEROSOL, METERED RESPIRATORY (INHALATION) at 19:54

## 2019-03-10 RX ADMIN — TACROLIMUS 0.5 MG: 0.5 CAPSULE ORAL at 07:40

## 2019-03-10 RX ADMIN — MESALAMINE 2400 MG: 800 TABLET, DELAYED RELEASE ORAL at 07:40

## 2019-03-10 RX ADMIN — Medication 500000 UNITS: at 19:54

## 2019-03-10 RX ADMIN — SILDENAFIL 20 MG: 20 TABLET ORAL at 14:50

## 2019-03-10 RX ADMIN — RANITIDINE 150 MG: 150 TABLET ORAL at 07:40

## 2019-03-10 RX ADMIN — PIPERACILLIN AND TAZOBACTAM 4.5 G: 4; .5 INJECTION, POWDER, FOR SOLUTION INTRAVENOUS at 04:08

## 2019-03-10 RX ADMIN — SILDENAFIL 20 MG: 20 TABLET ORAL at 19:53

## 2019-03-10 RX ADMIN — PREDNISONE 20 MG: 20 TABLET ORAL at 07:41

## 2019-03-10 RX ADMIN — Medication 500000 UNITS: at 11:40

## 2019-03-10 RX ADMIN — PIPERACILLIN AND TAZOBACTAM 4.5 G: 4; .5 INJECTION, POWDER, FOR SOLUTION INTRAVENOUS at 14:50

## 2019-03-10 RX ADMIN — Medication 500000 UNITS: at 15:47

## 2019-03-10 RX ADMIN — CALCIUM CHLORIDE, MAGNESIUM CHLORIDE, SODIUM CHLORIDE, SODIUM BICARBONATE, POTASSIUM CHLORIDE AND SODIUM PHOSPHATE DIBASIC DIHYDRATE 12.5 ML/KG/HR: 3.68; 3.05; 6.34; 3.09; .314; .187 INJECTION INTRAVENOUS at 07:56

## 2019-03-10 RX ADMIN — TERBUTALINE SULFATE 10 MG: 5 TABLET ORAL at 15:47

## 2019-03-10 RX ADMIN — FLUTICASONE PROPIONATE 2 PUFF: 220 AEROSOL, METERED RESPIRATORY (INHALATION) at 07:40

## 2019-03-10 RX ADMIN — CALCIUM CHLORIDE, MAGNESIUM CHLORIDE, SODIUM CHLORIDE, SODIUM BICARBONATE, POTASSIUM CHLORIDE AND SODIUM PHOSPHATE DIBASIC DIHYDRATE 12.5 ML/KG/HR: 3.68; 3.05; 6.34; 3.09; .314; .187 INJECTION INTRAVENOUS at 22:34

## 2019-03-10 RX ADMIN — MELATONIN TAB 3 MG 3 MG: 3 TAB at 19:52

## 2019-03-10 RX ADMIN — PREDNISONE 15 MG: 10 TABLET ORAL at 19:53

## 2019-03-10 RX ADMIN — CALCIUM CHLORIDE, MAGNESIUM CHLORIDE, SODIUM CHLORIDE, SODIUM BICARBONATE, POTASSIUM CHLORIDE AND SODIUM PHOSPHATE DIBASIC DIHYDRATE 12.5 ML/KG/HR: 3.68; 3.05; 6.34; 3.09; .314; .187 INJECTION INTRAVENOUS at 22:35

## 2019-03-10 RX ADMIN — CALCIUM CHLORIDE, MAGNESIUM CHLORIDE, SODIUM CHLORIDE, SODIUM BICARBONATE, POTASSIUM CHLORIDE AND SODIUM PHOSPHATE DIBASIC DIHYDRATE 12.5 ML/KG/HR: 3.68; 3.05; 6.34; 3.09; .314; .187 INJECTION INTRAVENOUS at 15:23

## 2019-03-10 RX ADMIN — TERBUTALINE SULFATE 10 MG: 5 TABLET ORAL at 07:40

## 2019-03-10 RX ADMIN — SILDENAFIL 20 MG: 20 TABLET ORAL at 07:40

## 2019-03-10 RX ADMIN — CALCIUM CHLORIDE, MAGNESIUM CHLORIDE, SODIUM CHLORIDE, SODIUM BICARBONATE, POTASSIUM CHLORIDE AND SODIUM PHOSPHATE DIBASIC DIHYDRATE 12.5 ML/KG/HR: 3.68; 3.05; 6.34; 3.09; .314; .187 INJECTION INTRAVENOUS at 15:24

## 2019-03-10 RX ADMIN — CALCIUM CHLORIDE 1 G: 100 INJECTION, SOLUTION INTRAVENOUS at 13:08

## 2019-03-10 RX ADMIN — VANCOMYCIN HYDROCHLORIDE 1250 MG: 10 INJECTION, POWDER, LYOPHILIZED, FOR SOLUTION INTRAVENOUS at 08:58

## 2019-03-10 RX ADMIN — DULOXETINE 20 MG: 20 CAPSULE, DELAYED RELEASE ORAL at 07:41

## 2019-03-10 RX ADMIN — ROSUVASTATIN CALCIUM 10 MG: 10 TABLET, FILM COATED ORAL at 07:40

## 2019-03-10 RX ADMIN — PIPERACILLIN AND TAZOBACTAM 4.5 G: 4; .5 INJECTION, POWDER, FOR SOLUTION INTRAVENOUS at 07:32

## 2019-03-10 RX ADMIN — CALCIUM CHLORIDE, MAGNESIUM CHLORIDE, SODIUM CHLORIDE, SODIUM BICARBONATE, POTASSIUM CHLORIDE AND SODIUM PHOSPHATE DIBASIC DIHYDRATE 3.66 ML/KG/HR: 3.68; 3.05; 6.34; 3.09; .314; .187 INJECTION INTRAVENOUS at 04:05

## 2019-03-10 RX ADMIN — ACETAMINOPHEN 650 MG: 325 TABLET, FILM COATED ORAL at 19:46

## 2019-03-10 RX ADMIN — MONTELUKAST SODIUM 10 MG: 10 TABLET, COATED ORAL at 22:27

## 2019-03-10 RX ADMIN — MYCOPHENOLATE MOFETIL 1000 MG: 500 TABLET ORAL at 18:06

## 2019-03-10 RX ADMIN — VALGANCICLOVIR HYDROCHLORIDE 450 MG: 450 TABLET ORAL at 08:58

## 2019-03-10 RX ADMIN — CALCIUM CHLORIDE, MAGNESIUM CHLORIDE, SODIUM CHLORIDE, SODIUM BICARBONATE, POTASSIUM CHLORIDE AND SODIUM PHOSPHATE DIBASIC DIHYDRATE 12.5 ML/KG/HR: 3.68; 3.05; 6.34; 3.09; .314; .187 INJECTION INTRAVENOUS at 07:54

## 2019-03-10 RX ADMIN — TERBUTALINE SULFATE 10 MG: 5 TABLET ORAL at 00:09

## 2019-03-10 RX ADMIN — LEVOTHYROXINE SODIUM 100 MCG: 100 TABLET ORAL at 07:40

## 2019-03-10 RX ADMIN — PIPERACILLIN AND TAZOBACTAM 4.5 G: 4; .5 INJECTION, POWDER, FOR SOLUTION INTRAVENOUS at 19:53

## 2019-03-10 RX ADMIN — TERBUTALINE SULFATE 10 MG: 5 TABLET ORAL at 23:38

## 2019-03-10 RX ADMIN — Medication 500000 UNITS: at 07:40

## 2019-03-10 RX ADMIN — MYCOPHENOLATE MOFETIL 1000 MG: 500 TABLET ORAL at 07:40

## 2019-03-10 ASSESSMENT — ACTIVITIES OF DAILY LIVING (ADL)
ADLS_ACUITY_SCORE: 17
ADLS_ACUITY_SCORE: 13

## 2019-03-10 ASSESSMENT — MIFFLIN-ST. JEOR: SCORE: 1435.5

## 2019-03-10 NOTE — PLAN OF CARE
D/I: Pt lethargic this morning, more alert this afternoon. Occasionally oriented x4 but for the most part disoriented. Follows commands. PERRLA. SR, BPs soft but MAPs > 60 with vaso gtt, now off vaso as of 1800. Normothermic. Sats % on RA. Minimal urine output via maddox. 1 medium sized watery BM, C.diff ordered. Poor appetite. Able to pull ~50ml/hr via CRRT now. No new skin issues.  A/P: Pull fluid as able. Vaso to keep MAP > 60

## 2019-03-10 NOTE — PROGRESS NOTES
CRRT STATUS NOTE    DATA:  Time:  5:27 PM  Pressures WNL:  YES  Filter Status:  WDL    Problems Reported/Alarms Noted:  None reported during shift    Supplies Present:  YES    ASSESSMENT:  Patient Net Fluid Balance:  -774 ml since midnight  Vital Signs:  T 97.3 F Axillary, HR 89, BP 98/64 (83) cuff, RR 15, Sat 97% on RA  Labs:  Results for ANA ROSA BALDWIN (MRN [id number]) as of 3/10/2019 17:26   Ref. Range 3/10/2019 15:49   Sodium Latest Ref Range: 133 - 144 mmol/L 134   Potassium Latest Ref Range: 3.4 - 5.3 mmol/L 4.5   Chloride Latest Ref Range: 94 - 109 mmol/L 105   Carbon Dioxide Latest Ref Range: 20 - 32 mmol/L 17 (L)   Urea Nitrogen Latest Ref Range: 7 - 30 mg/dL 46 (H)   Creatinine Latest Ref Range: 0.66 - 1.25 mg/dL 2.04 (H)   GFR Estimate Latest Ref Range: >60 mL/min/1.73_m2 35 (L)   GFR Estimate If Black Latest Ref Range: >60 mL/min/1.73_m2 41 (L)   Calcium Latest Ref Range: 8.5 - 10.1 mg/dL 8.6   Anion Gap Latest Ref Range: 3 - 14 mmol/L 12   Magnesium Latest Ref Range: 1.6 - 2.3 mg/dL 2.7 (H)   Phosphorus Latest Ref Range: 2.5 - 4.5 mg/dL 5.6 (H)   Albumin Latest Ref Range: 3.4 - 5.0 g/dL 2.6 (L)   Protein Total Latest Ref Range: 6.8 - 8.8 g/dL 5.4 (L)   Bilirubin Total Latest Ref Range: 0.2 - 1.3 mg/dL 1.0   Alkaline Phosphatase Latest Ref Range: 40 - 150 U/L 108   ALT Latest Ref Range: 0 - 70 U/L 21   AST Latest Ref Range: 0 - 45 U/L 29   Calcium Ionized Latest Ref Range: 4.4 - 5.2 mg/dL 4.6   Results for ANA ROSA BALDWIN (MRN [id number]) as of 3/10/2019 17:26   Ref. Range 3/10/2019 15:49   WBC Latest Ref Range: 4.0 - 11.0 10e9/L 13.6 (H)   Hemoglobin Latest Ref Range: 13.3 - 17.7 g/dL 8.2 (L)   Hematocrit Latest Ref Range: 40.0 - 53.0 % 25.3 (L)   Platelet Count Latest Ref Range: 150 - 450 10e9/L 131 (L)   RBC Count Latest Ref Range: 4.4 - 5.9 10e12/L 2.71 (L)     Goals of Therapy:  Currently exceeding since goal changed to I=O.    INTERVENTIONS:   None    PLAN:  Continue CRRT as per MD  order. Contact CRRT Resource RN with any questions or concerns.

## 2019-03-10 NOTE — PROGRESS NOTES
CV ICU PROGRESS NOTE  March 10, 2019      CO-MORBIDITIES:   Chronic systolic heart failure (H)  (primary encounter diagnosis)  Acute on chronic systolic congestive heart failure (H)    ASSESSMENT: Everton Larios is a 53 year old male with PMH etoh abuse, hypothyroidism, intermittent asthma, ulcerative colitis, Depression, Chronic HFrEF, NICM admitted with cardiogenic shock requiring subclavian balloon pump now s/p OHT 2/24/19 with [facility] and [facility].      TODAY'S PROGRESS:   -     PLAN:  Neuro/ pain/ sedation:  - Monitor neurological status. Notify the MD for any acute changes in exam.  - Tylenol and oxycodone - hold narcotics for altered mental status  #Lethargic   - Worsened on 3/8, potentially related to uremia vs sepsis  - Head CT 3/9 negative     #Depression  - PTA cymbalta 20mg  - Melatonin for sleep     Pulmonary care:   - Doing well on room air  - Supp oxygen to maintain SpO2 >92%  - Encourage IS and deep breathing  - Chest tubes to suction.     #Intermittent asthma  - PTA Montelukast ordered    Cardiovascular: HR goal >95. Echo 3/5 with good LV function, mild decreased RV function (moderately dilated).  - Monitor hemodynamic status.   - MAP >65. HR goal > 95.  - Dobutamine 2.5 mcg/kg/min for RV support  - Sildenafil 20 q8h   - Terbutaline 10 mg q8h  - Continue statin.   - No aspirin per cards.   - Pacing wires capped.     GI care:   - Regular diet  - Senna, miralax and suppository for bowel regimen; MoM and miralax prn     #Intraperitoneal free air (CT obtained 2/26)   - Small peritoneal defect made during redo operation, closed with stitches intra-op- likely source of free air. Resolved.    #Pierce's esophagus  #Ulcerative colitis  -PTA Mesalamine     Fluids/ Electrolytes/ Nutrition:   - TKO for IV fluid hydration  - No indication for parenteral nutrition.  - Replace electrolytes as needed.  #Hyponatremia  - Asymptomatic/improving on CRRT, no replacement for now, monitor sodium q6h    Renal/  Fluid Balance:   #FRANKLIN, oliguric.    - Etiology likely cardiorenal from venous congestion vs nephrotoxicity from multiple medications  - CRRT started today (3/9) to attempt fluid removal.   - Will continue to monitor intake and output.  - PTA Tamsulosin      Endocrine:    # Stress hyperglycemia  - sliding scale insulin as needed  # hypothyroidism  - Home levothyroxine      ID/ Antibiotics:  #Leukocytosis  - Pan cultured 3/4 for leukocytosis- NGTD.   - Afebrile. WBC trending up. Appearing more encephalopathic on exam  - Repeat blood cultures, send c diff, remove lin, replace a-line, and start vanco/zosyn empirically 3/9  - Perioperative antibiotics vanc/zosyn x 3 days- completed.  - Valcyte for CMV prophylaxis; holding bactrim for PCP prophylaxis. Pentamidine given on 3/4.   - Nystatin for thrush     Heme:     #Bypass of persistent left SVC to R atrial appendage with Presto-Davis graft. Will require lifelong anti-coagulation for graft  - Hgb goal >7. Daily CBC.  - Hgb 6.8 today, will transfuse 1 u pRBC.   - Low intensity heparin gtt    Prophylaxis:    - Mechanical prophylaxis for DVT  - Heparin as above  - Protonix qd     Lines/ tubes/ drains:  - RIJ HD line, Arterial line, L pleural chest tubes x2      Disposition:  - [facility] ICU.     Patient seen, findings and plan discussed with CV ICU staff, Dr. Henderson.     Leyla Bowling MD  General Surgery PGY-3  ====================================    SUBJECTIVE:   Still appears lethargic/somnolent   Head CT obtained overnight- negative  Hypotensive with CRRT, vasopressin started    OBJECTIVE:   1. VITAL SIGNS:   Temp:  [97  F (36.1  C)-97.7  F (36.5  C)] 97  F (36.1  C)  Pulse:  [93] 93  Heart Rate:  [] 90  Resp:  [8-30] 10  BP: ()/() 116/63  SpO2:  [96 %-100 %] 100 %  Resp: 10      2. INTAKE/ OUTPUT:   I/O last 3 completed shifts:  In: 2329.05 [P.O.:810; I.V.:1219.05]  Out: 2956 [Urine:109; Other:2277; Chest Tube:570]    3. PHYSICAL EXAMINATION:     General: not  easily arousable, intermittently nods to questions, easily fatigued   Neuro: moves all four extremities spontaneously  Resp: CTAB on room air, diminished at bases  CV: Regular rate, regular rhythm, serosanguinous output from L pleural chest tube   Abdomen: Soft, mildly distended, non-tender  Incisions: c/d/i, no erythema or drainage  Extremities: warm and well perfused, + peripheral edema    4. INVESTIGATIONS:   Arterial Blood Gases   No lab results found in last 7 days.  Complete Blood Count   Recent Labs   Lab 03/10/19  1003 03/10/19  0410 03/09/19 2122 03/09/19  1659   WBC 11.5* 12.1* 13.7* 13.5*   HGB 7.7* 7.6* 8.1* 7.8*   * 133* 141* 133*     Basic Metabolic Panel  Recent Labs   Lab 03/10/19  1003 03/10/19  0410 03/09/19 2122 03/09/19  1659    133 133 132*   POTASSIUM 4.5 4.6 4.6 4.7   CHLORIDE 103 102 101 102   CO2 18* 18* 19* 18*   BUN 50* 58* 67* 75*   CR 2.28* 2.61* 2.78* 3.03*   GLC 81 88 101* 73     Liver Function Tests  Recent Labs   Lab 03/10/19  1003 03/10/19  0410 03/09/19 2122 03/09/19  1659   AST 26 22 24 18   ALT 20 19 19 18   ALKPHOS 108 104 111 112   BILITOTAL 1.0 0.8 0.8 0.8   ALBUMIN 2.6* 2.8* 2.7* 2.7*     Pancreatic Enzymes  No lab results found in last 7 days.  Coagulation Profile  No lab results found in last 7 days.      5. RADIOLOGY:   Recent Results (from the past 24 hour(s))   XR Chest Port 1 View    Narrative    XR CHEST PORT 1 VW  3/9/2019 5:40 PM    History:  PICC access.     Comparison: Chest radiograph dated earlier today    Findings:   45 degree AP chest radiograph. Interval removal of [place]-Liz catheter.  New left upper extremity PICC line courses presumably in the left SVC  with the tip in the coronary sinus. Stable positioning of right  central venous catheter, left IJ sheath, and bilateral basilar chest  tubes.    Cardiac silhouette is at upper normal limits. Pulmonary vasculature is  indistinct. Trace bilateral pneumothorax. No significant left  pleural  effusion. Right costophrenic angle: Limited outside the field-of-view.      Impression    IMPRESSION:  1.  Interval removal of [place]-Liz catheter. New left upper extremity  PICC line courses presumably in the left SVC with the tip in the  coronary sinus, consider retraction approximately 8 to 9 cm and then  correlation with PICC line function. Otherwise stable supportive lines  and tubes.  2.  Cardiac megaly with mild pulmonary edema.  3.  Trace bilateral pneumothoraces.    I have personally reviewed the examination and initial interpretation  and I agree with the findings.    LAUREN RICHARD MD   XR Chest Port 1 View    Narrative    XR CHEST PORT 1 VW  3/9/2019 7:55 PM    History:  PICC access.     Comparison: Chest radiograph earlier today    Findings:   15 degree AP chest radiograph. Left upper extremity PICC line with tip  projects over left IVC. Stable position of right central venous  catheter, left IJ sheath in the bilateral basilar chest tubes.    Cardiac silhouette is at upper normal limits. Pulmonary vasculature is  indistinct. Bilateral apical lung was collimated also the field of  view. No significant pleural effusion.      Impression    IMPRESSION:  1.  Retraction of left upper extremity PICC line in the satisfactory  position. Otherwise stable supportive lines and tubes.   2.  Cardiomegaly with mild pulmonary edema.    I have personally reviewed the examination and initial interpretation  and I agree with the findings.    LAUREN RICHARD MD       =========================================

## 2019-03-10 NOTE — PLAN OF CARE
PT/4a:   Discharge Planner PT   Patient plan for discharge: not stated  Current status: Pt currently confused and having difficulty w/ one step commands. Pt rolls L/R w/ CGA-Joelle; supine <>sit w/ min Ax2; dangled EOB w/ up to min A for postural control.   Barriers to return to prior living situation: impaired cognition, impaired mobility; impaired safety awareness  Recommendations for discharge: short term rehab ([facility] vs TCU)  Rationale for recommendations: Currently w/ pt's impaired cognition - recommend TCU placement; however, if confusion clears and he is able to show carryover of prior learning, then would recommend [facility] placement in setting of medical mgmt needs (new heart transplant) and multiple therapy needs.        Entered by: Alma Rosa Wiggins 03/10/2019 9:31 AM

## 2019-03-10 NOTE — PROGRESS NOTES
CVICU attending note.    Everton Larios is a 53 year old male with PMH etoh abuse, hypothyroidism, intermittent asthma, ulcerative colitis, Depression, Chronic HFrEF, NICM admitted with cardiogenic shock requiring subclavian balloon pump now s/p heart transplant on 2/24/19 with [facility] and [facility].      Active problems and current treatments include:     1. Right heart failure s/p heart transplant with hypervolemia. On Dobutamine,  and Sildenafil. Immunosuppression per protocol managed by cardiology. Right heart cath and biopsy on 3/4. S TTE on 3/5 showed Mild to moderate right ventricular dilation is present. Global right ventricular function is mildly reduced. CVP high.  Plan: Continue Dobutamine. CRRT and remove fluids as possible. Check ScVo2.     2. Encephalopathy of critical illness likely multifactorial; metabolic and sepsis. Most probable uremia related. Stroke code called overnight, head CT negative for acute pathology. Plan: Monitor neuro function. Treat metabolic causes.      3. Suspicion sepsis of unclear etiology. Off Vasopressin. BC and remove lines. Cidiff negative. Plan: Continue  broad spectrum antibiotics until all cultures are available.     2. Anemia of critical illness and surgical blood loss. Slow decrease to Hgb 6.8 and 1 PRBC on 3/9. No signs of bleeding. Bypass vascular graft due to persistent SVC. Plan:  Monitor CBC. Continue Heparin infusion.      3. Oliguric acute kidney injury, hyponatremia. Plan: Continue CRRT.        The history and the 10 points review of systems are included in the note of Dr. Paredes.     I agree with the resident assessment and plan. I spent 15 minutes of patient care time exclusive of the procedures time, evaluating and managing this patient, discussing with the consultants and the patient's family.     Ara Henderson MD  Anesthesiology Critical Care Medicine  Pg. [phone]

## 2019-03-10 NOTE — PLAN OF CARE
Discharge Planner OT   Patient plan for discharge: not stated  Current status: Pt mod A functional transfers supine> EOB> chair, pt standing ~5min for pat cares after BM. RN w/ CRRT line mgmt throughout. Pt's VSS throughout.   Barriers to return to prior living situation: medical status  Recommendations for discharge: rehab  Rationale for recommendations: to increase ind in ADLS/IADLS       Entered by: Kat Staton 03/10/2019 3:06 PM     Interdisciplinary Non-Pharmacological Management of Delirium:     General Supportive Measures: Ensure adequate hydration and nutrition. Schedule toileting. Appropriate assessment and treatment of pain.   Re-Vancleve Patient: Ensure clock has correct time and white board has correct date. Communicate clearly, providing explanations as appropriate. Encourage presence of family members for reassurance. Have family/caregiver bring in familiar objects/pictures.  Cognition: Engage in appropriate meaningful communication and activities with patient (current events discussion, word games, magazines, newspapers).   Sensory: Use eyeglasses, hearing aids, or voice amplifiers as appropriate.   Avoid Use of Physical Restraints as Appropriate: Indicate need and frequently re-assess maddox catheters and other tethers (cap IVs if medically appropriate).   Maintain Mobility and Self Care Ability: Avoid bedrest. Have patient up in chair for meals if appropriate.   Normalize Sleep-Wake Cycle: Discourage too much daytime napping (less than 30 minutes at a time), aim for uninterrupted periods of sleep at night.   Days: Keep room well light with lights on and shades open.   Night: Keep room quiet with low level lighting.   For Agitation: Avoid overstimulating environment, try music, massage, appropriate TV stations, and relaxation techniques. Take patient for a walk if appropriate, even if in the middle of the night.   Safety Concerns: Patients with delirium are at high risk for falls. Use bed and chair  alarms along with frequent surveillance.

## 2019-03-10 NOTE — PROGRESS NOTES
PICC line was pulled out 9cm per radiologist's instruction. Good blood return noted. CXR came back and Dr. Tom saw it, said okay to use PICC.

## 2019-03-10 NOTE — PROGRESS NOTES
"NEPHROLOGY PROGRESS NOTE/CRRT FOLLOW UP  I saw Everton Larios on CRRT 03/09/19.  Clinical status, encounter notes, laboratory data and medications were reviewed.      Renal issue: FRANKLIN/CKD ; background of possible renovascular disease, CNI effect.    Overall clinical status: remains critically ill    Access: IJ    Prescription  Dialysate for CVVHD & CVVHDF (Phoxillum BK4/2.5) 12.5 mL/kg/hr  Replacement solution for CVVHD & CVVHDF (Phoxillum BK4/2.5):   Pre: 200 mL/kg/hr   Post: 12.5 mL/kg/hr  Planning for I = O today      EXAMINATION  Vital signs BP 99/73   Pulse 90   Temp 97.6  F (36.4  C) (Axillary)   Resp 25   Ht 1.727 m (5' 8\")   Wt 62.6 kg (138 lb 0.1 oz)   SpO2 99%   BMI 20.98 kg/m    ECFV: BP soft, moderately volume up  INVESTIGATIONS    Intake/Output Summary (Last 24 hours) at 3/10/2019 0916  Last data filed at 3/10/2019 0900  Gross per 24 hour   Intake 2207 ml   Output 2585 ml   Net -378 ml     UA RESULTS:    Last Basic Metabolic Panel:  Lab Results   Component Value Date     03/10/2019      Lab Results   Component Value Date    POTASSIUM 4.6 03/10/2019     Lab Results   Component Value Date    CHLORIDE 102 03/10/2019     Lab Results   Component Value Date    RADAMES 8.0 03/10/2019     Lab Results   Component Value Date    CO2 18 03/10/2019     Lab Results   Component Value Date    BUN 58 03/10/2019     Lab Results   Component Value Date    CR 2.61 03/10/2019     Lab Results   Component Value Date    GLC 88 03/10/2019     Other laboratory tests of note:    Lab Results   Component Value Date    WBC 12.1 03/10/2019     Lab Results   Component Value Date    RBC 2.49 03/10/2019     Lab Results   Component Value Date    HGB 7.6 03/10/2019     Lab Results   Component Value Date    HCT 22.9 03/10/2019     Lab Results   Component Value Date    MCV 92 03/10/2019     Lab Results   Component Value Date    MCH 30.5 03/10/2019     Lab Results   Component Value Date    MCHC 33.2 03/10/2019     Lab Results "   Component Value Date    RDW 15.2 03/10/2019     Lab Results   Component Value Date     03/10/2019             DULoxetine  20 mg Oral Daily     fluticasone  2 puff Inhalation Q12H     heparin lock flush  5 mL Intravenous Q24H     heparin lock flush  5-10 mL Intracatheter Q24H     insulin aspart  1-7 Units Subcutaneous TID AC     insulin aspart  1-5 Units Subcutaneous At Bedtime     levothyroxine  100 mcg Oral Daily     lidocaine  1 patch Transdermal Q24H     lidocaine   Transdermal Q8H     lidocaine   Transdermal Q24h     melatonin  3 mg Oral At Bedtime     mesalamine  2,400 mg Oral BID     montelukast  10 mg Oral At Bedtime     mycophenolate  1,000 mg Oral BID IS     nystatin  500,000 Units Oral 4x Daily     piperacillin-tazobactam  4.5 g Intravenous Q6H     polyethylene glycol  17 g Oral Daily     predniSONE  15 mg Oral QPM     predniSONE  20 mg Oral Daily     ranitidine  150 mg Oral Daily     rosuvastatin  10 mg Oral Daily     senna-docusate  1 tablet Oral BID    Or     senna-docusate  2 tablet Oral BID     sildenafil  20 mg Oral TID     sodium chloride (PF)  3 mL Intracatheter Q8H     tacrolimus  0.5 mg Oral BID IS     terbutaline  10 mg Oral Q8H     valGANciclovir  450 mg Oral Every Other Day     vancomycin (VANCOCIN) IV  1,250 mg Intravenous Q24H       CRRT replacement solution 12.5 mL/kg/hr (03/10/19 0754)     DOBUTamine 2.5 mcg/kg/min (03/10/19 0900)     HEParin 750 Units/hr (03/10/19 0900)     - MEDICATION INSTRUCTIONS -       - MEDICATION INSTRUCTIONS -       - MEDICATION INSTRUCTIONS -       CRRT replacement solution 3.656 mL/kg/hr (03/10/19 0405)     CRRT replacement solution 12.5 mL/kg/hr (03/10/19 0756)     BETA BLOCKER NOT PRESCRIBED       vasopressin (PITRESSIN) infusion ADULT (40 mL) Stopped (03/09/19 1802)     Abhijeet Paul MD

## 2019-03-10 NOTE — PROGRESS NOTES
Post PICC xray revealed something like a left SVC.  Conferred with Dr Tom.  She recommended to pull back 9 cm and re Xray.  After CXR, ok to use as long as there is good blood return.

## 2019-03-10 NOTE — PROGRESS NOTES
.Calorie Count  Intake recorded for: 3/9  Total Kcals: 38 Total Protein: 5g  Kcals from Hospital Food: 38   Protein: 5g  Kcals from Outside Food (average):0 Protein: 0g  # Meals Recorded: 0  # Supplements Recorded: 25% Gelatin plus

## 2019-03-10 NOTE — PLAN OF CARE
D/I: Pt alert and oriented at times, otherwise confused and having auditory and visual hallucinations. PERRLA. Following commands. Up to chair with moderate assist x1. SR, BP WDL. Afebrile. Sats high 90s on RA. Poor appetite, pushing supplements. Apul with poor UOP. CRRT pulling ~50ml/hr without issues, now I=O per Renal. 4 loose BMs, C. Diff negative, enteric isolation discontinued. No new skin issues.  A/P: Plan is for heart cath tomorrow, consent yet to be signed. NPO @ midnight. Push food/fluids as patient will allow. Reorient, promote day/night schedule.

## 2019-03-10 NOTE — PROGRESS NOTES
CVTS  PROGRESS NOTE  March 10, 2019      CO-MORBIDITIES:   Chronic systolic heart failure (H)  (primary encounter diagnosis)  Acute on chronic systolic congestive heart failure (H)    ASSESSMENT: Everton Larios is a 53 year old male with PMH etoh abuse, hypothyroidism, intermittent asthma, ulcerative colitis, Depression, Chronic HFrEF, NICM admitted with cardiogenic shock requiring subclavian balloon pump now s/p OHT 2/24/19 with [facility] and [facility].      TODAY'S PROGRESS:   -BP more stable, off vasopressin, will continue to pull fluid CRRT  -Continued delirium, but oriented to person and place this morning  -Check SVO2  -Follow up on cultures, recommend discontinuing antibiotics if cultures negative    PLAN:  Neuro/ pain/ sedation:  - Monitor neurological status. Notify the MD for any acute changes in exam.  - Tylenol and oxycodone - hold narcotics for altered mental status  #Lethargic   - Worsened on 3/8, potentially related to uremia vs sepsis  - Head CT 3/9 negative     #Depression  - PTA cymbalta 20mg  - Melatonin for sleep     Pulmonary care:   - Doing well on room air  - Supp oxygen to maintain SpO2 >92%  - Encourage IS and deep breathing  - Chest tubes to suction.     #Intermittent asthma  - PTA Montelukast ordered    Cardiovascular: HR goal >95. Echo 3/5 with good LV function, mild decreased RV function (moderately dilated).  - Monitor hemodynamic status.   - MAP >65. HR goal > 95.  - Dobutamine 2.5 mcg/kg/min for RV support  - Sildenafil 20 q8h   - Terbutaline 10 mg q8h  - Continue statin.   - No aspirin per cards.   - Pacing wires capped.     GI care:   - Regular diet  - Senna, miralax and suppository for bowel regimen; MoM and miralax prn     #Intraperitoneal free air (CT obtained 2/26)   - Small peritoneal defect made during redo operation, closed with stitches intra-op- likely source of free air. Resolved.    #Pierce's esophagus  #Ulcerative colitis  -PTA Mesalamine     Fluids/ Electrolytes/  Nutrition:   - TKO for IV fluid hydration  - No indication for parenteral nutrition.  - Replace electrolytes as needed.  #Hyponatremia  - Asymptomatic/improving on CRRT, no replacement for now, monitor sodium q6h    Renal/ Fluid Balance:   #FRANKLIN, oliguric.    - Etiology likely cardiorenal from venous congestion vs nephrotoxicity from multiple medications  - CRRT started (3/9) to attempt fluid removal, possible iHD 3/11  - Will continue to monitor intake and output.  - PTA Tamsulosin      Endocrine:    # Stress hyperglycemia  - sliding scale insulin as needed  # hypothyroidism  - Home levothyroxine      ID/ Antibiotics:Perioperative antibiotics vanc/zosyn x 3 days- completed  #Leukocytosis, persistent since OR 2/24, afebrile, no change in hemodynamics with his change in mental status  - BCx 3/9 x2 NGTD, CDiff 3/10/2019 negative  - BC and swan tip cultured 3/4 for leukocytosis- NGTD.   - Empirically started on Vanc/Zosyn  - Valcyte for CMV prophylaxis; holding bactrim for PCP prophylaxis. Pentamidine given on 3/4.   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 Will require lifelong anti-coagulation for graft  - Hgb goal >7. Daily CBC.  - Hgb 6.8 3/9, transfused 1 u pRBC, stable 3/10/2019, no s/s bleeding   - Low intensity heparin gtt    Prophylaxis:    - Mechanical prophylaxis for DVT  - Heparin as above  - Protonix qd     Lines/ tubes/ drains:  - RIJ HD line, Arterial line, L pleural chest tubes x2      Disposition:  - CV ICU.     Patient seen, findings and plan discussed with CVTS Fellow    Nicolas Paredes MD  CA-3/PGY-4 Anesthesiology  [phone]  ====================================    SUBJECTIVE:   Off pressors since 6pm 3/9, clearer this morning per nurse    OBJECTIVE:   1. VITAL SIGNS:   Temp:  [97  F (36.1  C)-97.7  F (36.5  C)] 97.3  F (36.3  C)  Pulse:  [93] 93  Heart Rate:  [] 92  Resp:  [8-30] 18  BP: ()/() 96/63  SpO2:   [96 %-100 %] 98 %  Resp: 18      2. INTAKE/ OUTPUT:   I/O last 3 completed shifts:  In: 2329.05 [P.O.:810; I.V.:1219.05]  Out: 2956 [Urine:109; Other:2277; Chest Tube:570]    3. PHYSICAL EXAMINATION:     General: easily arousable, resting comfortably in bed, watching tv  Neuro: Oriented to person, place and year, did not know month, moves all four extremities spontaneously   Resp: NLB on RA  CV: Regular rate, regular rhythm, serosanguinous output from L pleural chest tube   Abdomen: Soft, mildly distended, non-tender  Incisions: c/d/i, no erythema or drainage  Extremities: warm and well perfused, + peripheral edema    4. INVESTIGATIONS:   Arterial Blood Gases   No lab results found in last 7 days.  Complete Blood Count   Recent Labs   Lab 03/10/19  1549 03/10/19  1003 03/10/19  0410 03/09/19 2122   WBC 13.6* 11.5* 12.1* 13.7*   HGB 8.2* 7.7* 7.6* 8.1*   * 131* 133* 141*     Basic Metabolic Panel  Recent Labs   Lab 03/10/19  1549 03/10/19  1003 03/10/19  0410 03/09/19 2122    135 133 133   POTASSIUM 4.5 4.5 4.6 4.6   CHLORIDE 105 103 102 101   CO2 17* 18* 18* 19*   BUN 46* 50* 58* 67*   CR 2.04* 2.28* 2.61* 2.78*   GLC 82 81 88 101*     Liver Function Tests  Recent Labs   Lab 03/10/19  1549 03/10/19  1003 03/10/19  0410 03/09/19 2122   AST 29 26 22 24   ALT 21 20 19 19   ALKPHOS 108 108 104 111   BILITOTAL 1.0 1.0 0.8 0.8   ALBUMIN 2.6* 2.6* 2.8* 2.7*     Pancreatic Enzymes  No lab results found in last 7 days.  Coagulation Profile  No lab results found in last 7 days.      5. RADIOLOGY:   Recent Results (from the past 24 hour(s))   XR Chest Port 1 View    Narrative    XR CHEST PORT 1 VW  3/9/2019 5:40 PM    History:  PICC access.     Comparison: Chest radiograph dated earlier today    Findings:   45 degree AP chest radiograph. Interval removal of [place]-Liz catheter.  New left upper extremity PICC line courses presumably in the left SVC  with the tip in the coronary sinus. Stable positioning of  right  central venous catheter, left IJ sheath, and bilateral basilar chest  tubes.    Cardiac silhouette is at upper normal limits. Pulmonary vasculature is  indistinct. Trace bilateral pneumothorax. No significant left pleural  effusion. Right costophrenic angle: Limited outside the field-of-view.      Impression    IMPRESSION:  1.  Interval removal of [place]-Liz catheter. New left upper extremity  PICC line courses presumably in the left SVC with the tip in the  coronary sinus, consider retraction approximately 8 to 9 cm and then  correlation with PICC line function. Otherwise stable supportive lines  and tubes.  2.  Cardiac megaly with mild pulmonary edema.  3.  Trace bilateral pneumothoraces.    I have personally reviewed the examination and initial interpretation  and I agree with the findings.    LAUREN RICHARD MD   XR Chest Port 1 View    Narrative    XR CHEST PORT 1 VW  3/9/2019 7:55 PM    History:  PICC access.     Comparison: Chest radiograph earlier today    Findings:   15 degree AP chest radiograph. Left upper extremity PICC line with tip  projects over left IVC. Stable position of right central venous  catheter, left IJ sheath in the bilateral basilar chest tubes.    Cardiac silhouette is at upper normal limits. Pulmonary vasculature is  indistinct. Bilateral apical lung was collimated also the field of  view. No significant pleural effusion.      Impression    IMPRESSION:  1.  Retraction of left upper extremity PICC line in the satisfactory  position. Otherwise stable supportive lines and tubes.   2.  Cardiomegaly with mild pulmonary edema.    I have personally reviewed the examination and initial interpretation  and I agree with the findings.    LAUREN RICHARD MD       =========================================

## 2019-03-10 NOTE — PLAN OF CARE
"  D: admitted 2/17 w/worsening heart failure; 2/24 heart transplant; 3/8 CRRT initiated.      A/I:   Neuro/psych: alert, confused, forgetful, restless all night. Fidgeting with equipment, had been directable until this morning, now is grabbing at lines to the point of requiring mitts and/or possibly a sitter. Continues to try to get out of bed to void. Suspect visual and perhaps auditory hallucinations. Asked about the \"green guitar that was floating around room,\" to name one example.   CV: HR . BPs stable with MAPs 60s-70s, above goal of 60 without restarting Vaso.   Pulm: room air. Lungs with ongoing fine crackles/diminished in bases.    ID: normothermic with intermittent use of eulalio hugger.   GI: poor appetite. Ate a few bites of jello supplement. Had some intermittent queasiness/stomach upset last evening, improved with zofran and minimizing triggers. No BMs, but passing gas.   : UOP via maddox 1-5mL/hr. CRRT continued. Able to meet a goal net negative of 0-50cc/hr.  Lines: R IJ CRRT access. L DL PICC. PIV x1.  Gtts: Dobutamine 2.5 mcg/kg/min. Heparin 750 units/hr. TKO x2 total 10mL/hr. Vaso off.   Skin/incisions/chest tubes: old chest tube sites (between chest tubes) appear red/moist/white/tender; cleansed/non-adherent dressing applied. Current chest tubes left ELEONORA due to no drainage from sites (per order); output to [facility], no air leak, ~10-20mL/hr.Sternal/chest incisions intact/ELEONORA. Sacral mepilex covering bruising over bony prominence (slightly improved appearance of bruise).   Labs: stable; see results.      P: CRRT. PT/OT. Continue to monitor and provide critical cares.        "

## 2019-03-10 NOTE — PROGRESS NOTES
CV ICU PROGRESS NOTE  March 10, 2019      CO-MORBIDITIES:   Chronic systolic heart failure (H)  (primary encounter diagnosis)  Acute on chronic systolic congestive heart failure (H)    ASSESSMENT: Everton Larios is a 53 year old male with PMH etoh abuse, hypothyroidism, intermittent asthma, ulcerative colitis, Depression, Chronic HFrEF, NICM admitted with cardiogenic shock requiring subclavian balloon pump now s/p OHT 2/24/19 with Piyush House and Christopher.      TODAY'S PROGRESS:   -BP more stable, off vasopressin, will continue to pull fluid CRRT  -Continued delirium, but oriented to person and place this morning  -Check SVO2  -Follow up on cultures, recommend discontinuing antibiotics if cultures negative    PLAN:  Neuro/ pain/ sedation:  - Monitor neurological status. Notify the MD for any acute changes in exam.  - Tylenol and oxycodone - hold narcotics for altered mental status  #Lethargic   - Worsened on 3/8, potentially related to uremia vs sepsis  - Head CT 3/9 negative     #Depression  - PTA cymbalta 20mg  - Melatonin for sleep     Pulmonary care:   - Doing well on room air  - Supp oxygen to maintain SpO2 >92%  - Encourage IS and deep breathing  - Chest tubes to suction.     #Intermittent asthma  - PTA Montelukast ordered    Cardiovascular: HR goal >95. Echo 3/5 with good LV function, mild decreased RV function (moderately dilated).  - Monitor hemodynamic status.   - MAP >65. HR goal > 95.  - Dobutamine 2.5 mcg/kg/min for RV support  - Sildenafil 20 q8h   - Terbutaline 10 mg q8h  - Continue statin.   - No aspirin per cards.   - Pacing wires capped.     GI care:   - Regular diet  - Senna, miralax and suppository for bowel regimen; MoM and miralax prn     #Intraperitoneal free air (CT obtained 2/26)   - Small peritoneal defect made during redo operation, closed with stitches intra-op- likely source of free air. Resolved.    #Pierce's esophagus  #Ulcerative colitis  -PTA Mesalamine     Fluids/ Electrolytes/  Nutrition:   - TKO for IV fluid hydration  - No indication for parenteral nutrition.  - Replace electrolytes as needed.  #Hyponatremia  - Asymptomatic/improving on CRRT, no replacement for now, monitor sodium q6h    Renal/ Fluid Balance:   #FRANKLIN, oliguric.    - Etiology likely cardiorenal from venous congestion vs nephrotoxicity from multiple medications  - CRRT started (3/9) to attempt fluid removal, possible iHD 3/11  - Will continue to monitor intake and output.  - PTA Tamsulosin      Endocrine:    # Stress hyperglycemia  - sliding scale insulin as needed  # hypothyroidism  - Home levothyroxine      ID/ Antibiotics:Perioperative antibiotics vanc/zosyn x 3 days- completed  #Leukocytosis, persistent since OR 2/24, afebrile, no change in hemodynamics with his change in mental status  - BCx 3/9 x2 NGTD, CDiff 3/10/2019 negative  - BC and swan tip cultured 3/4 for leukocytosis- NGTD.   - Empirically started on Vanc/Zosyn  - Valcyte for CMV prophylaxis; holding bactrim for PCP prophylaxis. Pentamidine given on 3/4.   - Nystatin for thrush     Heme:     #Bypass of persistent left SVC to R atrial appendage with [place] graft. Will require lifelong anti-coagulation for graft  - Hgb goal >7. Daily CBC.  - Hgb 6.8 3/9, transfused 1 u pRBC, stable 3/10/2019, no s/s bleeding   - Low intensity heparin gtt    Prophylaxis:    - Mechanical prophylaxis for DVT  - Heparin as above  - Protonix qd     Lines/ tubes/ drains:  - RIJ HD line, Arterial line, L pleural chest tubes x2      Disposition:  - CV ICU.     Patient seen, findings and plan discussed with CV ICU staff, Dr. Henderson.     Nicolas Paredes MD  CA-3/PGY-4 Anesthesiology  [phone]  ====================================    SUBJECTIVE:   Off pressors since 6pm 3/9, clearer this morning per nurse    OBJECTIVE:   1. VITAL SIGNS:   Temp:  [97  F (36.1  C)-97.7  F (36.5  C)] 97  F (36.1  C)  Pulse:  [93] 93  Heart Rate:  [] 90  Resp:  [8-30] 15  BP: ()/()  85/64  SpO2:  [96 %-100 %] 99 %  Resp: 15      2. INTAKE/ OUTPUT:   I/O last 3 completed shifts:  In: 2051 [P.O.:570; I.V.:1179; Other:2]  Out: 1804 [Urine:143; Other:1081; Chest Tube:580]    3. PHYSICAL EXAMINATION:     General: easily arousable, resting comfortably in bed, watching tv  Neuro: Oriented to person, place and year, did not know month, moves all four extremities spontaneously   Resp: NLB on RA  CV: Regular rate, regular rhythm, serosanguinous output from L pleural chest tube   Abdomen: Soft, mildly distended, non-tender  Incisions: c/d/i, no erythema or drainage  Extremities: warm and well perfused, + peripheral edema    4. INVESTIGATIONS:   Arterial Blood Gases   No lab results found in last 7 days.  Complete Blood Count   Recent Labs   Lab 03/10/19  1003 03/10/19  0410 03/09/19 2122 03/09/19  1659   WBC 11.5* 12.1* 13.7* 13.5*   HGB 7.7* 7.6* 8.1* 7.8*   * 133* 141* 133*     Basic Metabolic Panel  Recent Labs   Lab 03/10/19  1003 03/10/19  0410 03/09/19 2122 03/09/19  1659    133 133 132*   POTASSIUM 4.5 4.6 4.6 4.7   CHLORIDE 103 102 101 102   CO2 18* 18* 19* 18*   BUN 50* 58* 67* 75*   CR 2.28* 2.61* 2.78* 3.03*   GLC 81 88 101* 73     Liver Function Tests  Recent Labs   Lab 03/10/19  1003 03/10/19  0410 03/09/19 2122 03/09/19  1659   AST 26 22 24 18   ALT 20 19 19 18   ALKPHOS 108 104 111 112   BILITOTAL 1.0 0.8 0.8 0.8   ALBUMIN 2.6* 2.8* 2.7* 2.7*     Pancreatic Enzymes  No lab results found in last 7 days.  Coagulation Profile  No lab results found in last 7 days.      5. RADIOLOGY:   Recent Results (from the past 24 hour(s))   US Chest Wall    Narrative    US CHEST WALL  3/9/2019 1:19 PM    History:  right upper chest swelling at site of subclavian artery  intraaortic balloon pump with graft access.     Comparison: Chest radiograph dated 3/9/2019    Findings:   Targeted ultrasound of the clinical concern overlying right upper  chest demonstrates a complex hypoechoic fluid  collection at to the  right chest wall under the site of incision without associated  increased blood flow.      Impression    IMPRESSION:  Findings compatible with a right chest wall subcutaneous hematoma. No  associated internal increased vascularity.    I have personally reviewed the examination and initial interpretation  and I agree with the findings.    MADISON PEREZ MD   XR Chest Port 1 View    Narrative    XR CHEST PORT 1 VW  3/9/2019 5:40 PM    History:  PICC access.     Comparison: Chest radiograph dated earlier today    Findings:   45 degree AP chest radiograph. Interval removal of [place]-Liz catheter.  New left upper extremity PICC line courses presumably in the left SVC  with the tip in the coronary sinus. Stable positioning of right  central venous catheter, left IJ sheath, and bilateral basilar chest  tubes.    Cardiac silhouette is at upper normal limits. Pulmonary vasculature is  indistinct. Trace bilateral pneumothorax. No significant left pleural  effusion. Right costophrenic angle: Limited outside the field-of-view.      Impression    IMPRESSION:  1.  Interval removal of [place]-Liz catheter. New left upper extremity  PICC line courses presumably in the left SVC with the tip in the  coronary sinus, consider retraction approximately 8 to 9 cm and then  correlation with PICC line function. Otherwise stable supportive lines  and tubes.  2.  Cardiac megaly with mild pulmonary edema.  3.  Trace bilateral pneumothoraces.    I have personally reviewed the examination and initial interpretation  and I agree with the findings.    LAUREN RICHARD MD   XR Chest Port 1 View    Narrative    XR CHEST PORT 1 VW  3/9/2019 7:55 PM    History:  PICC access.     Comparison: Chest radiograph earlier today    Findings:   15 degree AP chest radiograph. Left upper extremity PICC line with tip  projects over left IVC. Stable position of right central venous  catheter, left IJ sheath in the bilateral basilar chest  tubes.    Cardiac silhouette is at upper normal limits. Pulmonary vasculature is  indistinct. Bilateral apical lung was collimated also the field of  view. No significant pleural effusion.      Impression    IMPRESSION:  1.  Retraction of left upper extremity PICC line in the satisfactory  position. Otherwise stable supportive lines and tubes.   2.  Cardiomegaly with mild pulmonary edema.    I have personally reviewed the examination and initial interpretation  and I agree with the findings.    LAUREN RICHARD MD       =========================================

## 2019-03-10 NOTE — PROGRESS NOTES
CRRT STATUS NOTE    DATA:  Time:  6:30 AM  Pressures WNL:  YES  Filter Status:  WDL    Problems Reported/Alarms Noted:  none    Supplies Present:  YES    ASSESSMENT:  Patient Net Fluid Balance:  3/9: Net +521 mL; 3/10 since MN: Net -211 mL  Vital Signs:  T 97.2-97.7 oral, HR 96-97, MAP 73-84, RR 10-30, SPO2 97-99%  Labs:  Na 133, Crt 2.78  Goals of Therapy:  Net neg 0-50 mL/h, keep MAP>65, net neg 1L/24h, meeting goals at this time    INTERVENTIONS:   none    PLAN:  Continue fluid removal as tolerated per goals of therapy. Check circuit daily and change circuit q72h and prn. Please contact CRRT resource RN at [phone] with any questions/concerns.

## 2019-03-10 NOTE — PROGRESS NOTES
ADVANCED HEART FAILURE PROGRESS NOTE    SUBJECTIVE:  No acute events. Still somewhat disoriented overnight but better this morning. No complaints today.    ROS otherwise negative.    OBJECTIVE:  Vital signs:  Temp: 97.7  F (36.5  C) Temp  Min: 97.2  F (36.2  C)  Max: 97.8  F (36.6  C)  Heart Rate: 96 Heart Rate  Min: 92  Max: 101  BP: 107/56 Systolic (24hrs), Av , Min:51 , Max:127   Diastolic (24hrs), Av, Min:39, Max:117    Resp: 18 Resp  Min: 8  Max: 30  SpO2: 98 % SpO2  Min: 96 %  Max: 100 %      Intake/Output Summary (Last 24 hours) at 3/10/2019 0646  Last data filed at 3/10/2019 0600  Gross per 24 hour   Intake 2051 ml   Output 1804 ml   Net 247 ml       Vitals:    19 0500 19 0200 03/10/19 0000   Weight: 61.7 kg (136 lb 0.4 oz) 64.1 kg (141 lb 5 oz) 62.6 kg (138 lb 0.1 oz)       Physical Exam:  GEN:  awake, alert, no distress  CV:  Regular rate and rhythm, no murmur.   CHEST: Subclavian incision sutured.  Chest tubes.  LUNGS:  On room air, normal work of breathing, Clear to auscultation bilaterally, no wheezes or crackles.   ABD:  Active bowel sounds, soft, non-tender/non-distended.  No rebound/guarding/rigidity.  EXT:  No edema or cyanosis.  Hands/feet warm to touch with good signs of peripheral perfusion.   NEURO: awake, alert, oriented, mildly confused      Medications:    CRRT replacement solution 12.5 mL/kg/hr (19)     DOBUTamine 2.5 mcg/kg/min (03/10/19 0600)     HEParin 750 Units/hr (03/10/19 0600)     - MEDICATION INSTRUCTIONS -       - MEDICATION INSTRUCTIONS -       - MEDICATION INSTRUCTIONS -       CRRT replacement solution 3.656 mL/kg/hr (03/10/19 0405)     CRRT replacement solution 12.5 mL/kg/hr (19)     BETA BLOCKER NOT PRESCRIBED       vasopressin (PITRESSIN) infusion ADULT (40 mL) Stopped (19)       Current Facility-Administered Medications   Medication Dose Route Frequency     DULoxetine  20 mg Oral Daily     fluticasone  2 puff Inhalation  Q12H     heparin lock flush  5 mL Intravenous Q24H     heparin lock flush  5-10 mL Intracatheter Q24H     insulin aspart  1-7 Units Subcutaneous TID AC     insulin aspart  1-5 Units Subcutaneous At Bedtime     levothyroxine  100 mcg Oral Daily     lidocaine  1 patch Transdermal Q24H     lidocaine   Transdermal Q8H     lidocaine   Transdermal Q24h     melatonin  3 mg Oral At Bedtime     mesalamine  2,400 mg Oral BID     montelukast  10 mg Oral At Bedtime     mycophenolate  1,000 mg Oral BID IS     nystatin  500,000 Units Oral 4x Daily     piperacillin-tazobactam  4.5 g Intravenous Q6H     polyethylene glycol  17 g Oral Daily     predniSONE  15 mg Oral QPM     predniSONE  20 mg Oral Daily     ranitidine  150 mg Oral Daily     rosuvastatin  10 mg Oral Daily     senna-docusate  1 tablet Oral BID    Or     senna-docusate  2 tablet Oral BID     sildenafil  20 mg Oral TID     sodium chloride (PF)  3 mL Intracatheter Q8H     tacrolimus  0.5 mg Oral BID IS     tacrolimus  1.5 mg Oral QAM     terbutaline  10 mg Oral Q8H     valGANciclovir  450 mg Oral Every Other Day     vancomycin (VANCOCIN) IV  1,250 mg Intravenous Q24H         Labs:  CMP  Recent Labs   Lab 03/10/19  0410 03/09/19 2122 03/09/19  1148    133   < >  --    POTASSIUM 4.6 4.6   < >  --    CHLORIDE 102 101   < >  --    CO2 18* 19*   < >  --    BUN 58* 67*   < >  --    CR 2.61* 2.78*   < >  --    RADAMES 8.0* 8.3*   < >  --    MAG 2.7* 2.6*   < >  --    PHOS 5.8* 6.2*   < >  --    GLC 88 101*   < >  --    LDH  --   --   --  266*   ALKPHOS 104 111   < >  --    BILITOTAL 0.8 0.8   < >  --    AST 22 24   < >  --    ALT 19 19   < >  --     < > = values in this interval not displayed.     BLOOD GASNo lab results found in last 7 days.  CBC  Recent Labs   Lab 03/10/19  0410 03/09/19  2122   WBC 12.1* 13.7*   HGB 7.6* 8.1*   HCT 22.9* 24.7*   * 141*     COAGNo lab results found in last 7 days.      ASSESSMENT/PLAN:  Everton Larios is a 54 yo gentleman who  underwent orthotopic heart transplant 2/24/19 for non ischemic cardiomyopathy and cardiogenic shock. Post-op complicated by RV dysfunction and FRANKLIN.     Updates today:  - continue CRRT, may transition to intermittent HD tomorrow  - plan for RHC and biopsy # 2 tomorrow     #Oliguric renal failure:  Etiology cardiorenal from venous congestion vs. nephrotoxins.  - now on CRRT, pulling fluid as tolerated  - nephrology consulted, appreciate assistance  - may transition to intermittent HD tomorrow     #Hyponatremia:  Likely hypervolemic hyponatremia from mild RV failure.  -Daily BMP  -Urine Na     #Sepsis  - start empiric vancomycin and zosyn (3/9 - )  - cultures no growth to date  - US of right chest wall swelling shows stable hematoma     #S/p OHT on 2/24:  Graft function:  - echo 3/1 shows normal LV function, RV is mild to moderately dilated and hypokinetic  - dobutamine at 2.5 for continued RV and hemodynamic support  - continue sildenafil 20 q8h  - off isuprel, continue terbutaline 10 q8h     Immunosuppression:  - on MMF decreased to 1000 BID given possible sepsis  - s/p methylpred 125 x 3 doses, continue prednisone 20mg BID, taper by 5 daily with negative biopsies  - NOT Simulect protocol, level elevated at 19 this morning, holding tacro for now, recheck level tomorrow     Prophylaxis:  - CMV +/+: medium risk, start renal dose Valcyte  - PCP: holding Bactrim for now, pentamadine given 3/3  - Thrush: start nystatin  - GI: on PPI  - CAV: start Crestor, holding ASA given bleeding at line sites     Biopsies:  - First biopsy Monday 3/4, 0R, no AMR  - Second biopsy tomorrow 3/11     Goretex conduit for persistent left SVC:  - will need anticoagulation, on low intensity heparin        Seen and staffed with Dr. Galan.      Trev Harris MD  Advanced Heart Failure Fellow  [phone]

## 2019-03-11 ENCOUNTER — APPOINTMENT (OUTPATIENT)
Dept: GENERAL RADIOLOGY | Facility: CLINIC | Age: 54
DRG: 001 | End: 2019-03-11
Attending: STUDENT IN AN ORGANIZED HEALTH CARE EDUCATION/TRAINING PROGRAM
Payer: COMMERCIAL

## 2019-03-11 ENCOUNTER — APPOINTMENT (OUTPATIENT)
Dept: GENERAL RADIOLOGY | Facility: CLINIC | Age: 54
DRG: 001 | End: 2019-03-11
Attending: PHYSICIAN ASSISTANT
Payer: COMMERCIAL

## 2019-03-11 LAB
ALBUMIN SERPL-MCNC: 2.4 G/DL (ref 3.4–5)
ALBUMIN SERPL-MCNC: 2.6 G/DL (ref 3.4–5)
ALBUMIN SERPL-MCNC: 2.6 G/DL (ref 3.4–5)
ALBUMIN SERPL-MCNC: 2.8 G/DL (ref 3.4–5)
ALP SERPL-CCNC: 102 U/L (ref 40–150)
ALP SERPL-CCNC: 94 U/L (ref 40–150)
ALP SERPL-CCNC: 97 U/L (ref 40–150)
ALP SERPL-CCNC: 98 U/L (ref 40–150)
ALT SERPL W P-5'-P-CCNC: 21 U/L (ref 0–70)
ALT SERPL W P-5'-P-CCNC: 21 U/L (ref 0–70)
ALT SERPL W P-5'-P-CCNC: 22 U/L (ref 0–70)
ALT SERPL W P-5'-P-CCNC: 22 U/L (ref 0–70)
ANION GAP SERPL CALCULATED.3IONS-SCNC: 12 MMOL/L (ref 3–14)
ANION GAP SERPL CALCULATED.3IONS-SCNC: 15 MMOL/L (ref 3–14)
ANION GAP SERPL CALCULATED.3IONS-SCNC: 16 MMOL/L (ref 3–14)
ANION GAP SERPL CALCULATED.3IONS-SCNC: 17 MMOL/L (ref 3–14)
AST SERPL W P-5'-P-CCNC: 29 U/L (ref 0–45)
AST SERPL W P-5'-P-CCNC: 31 U/L (ref 0–45)
AST SERPL W P-5'-P-CCNC: 31 U/L (ref 0–45)
AST SERPL W P-5'-P-CCNC: 32 U/L (ref 0–45)
BASE DEFICIT BLDV-SCNC: 5.3 MMOL/L
BASOPHILS # BLD AUTO: 0 10E9/L (ref 0–0.2)
BASOPHILS NFR BLD AUTO: 0.1 %
BILIRUB SERPL-MCNC: 0.9 MG/DL (ref 0.2–1.3)
BUN SERPL-MCNC: 34 MG/DL (ref 7–30)
BUN SERPL-MCNC: 34 MG/DL (ref 7–30)
BUN SERPL-MCNC: 35 MG/DL (ref 7–30)
BUN SERPL-MCNC: 39 MG/DL (ref 7–30)
CA-I SERPL ISE-MCNC: 4.2 MG/DL (ref 4.4–5.2)
CA-I SERPL ISE-MCNC: 4.3 MG/DL (ref 4.4–5.2)
CA-I SERPL ISE-MCNC: 4.4 MG/DL (ref 4.4–5.2)
CA-I SERPL ISE-MCNC: 4.7 MG/DL (ref 4.4–5.2)
CALCIUM SERPL-MCNC: 7.6 MG/DL (ref 8.5–10.1)
CALCIUM SERPL-MCNC: 7.7 MG/DL (ref 8.5–10.1)
CALCIUM SERPL-MCNC: 8 MG/DL (ref 8.5–10.1)
CALCIUM SERPL-MCNC: 8.1 MG/DL (ref 8.5–10.1)
CHLORIDE SERPL-SCNC: 103 MMOL/L (ref 94–109)
CHLORIDE SERPL-SCNC: 103 MMOL/L (ref 94–109)
CHLORIDE SERPL-SCNC: 104 MMOL/L (ref 94–109)
CHLORIDE SERPL-SCNC: 106 MMOL/L (ref 94–109)
CO2 SERPL-SCNC: 18 MMOL/L (ref 20–32)
CO2 SERPL-SCNC: 19 MMOL/L (ref 20–32)
COPATH REPORT: NORMAL
CREAT SERPL-MCNC: 1.72 MG/DL (ref 0.66–1.25)
CREAT SERPL-MCNC: 1.77 MG/DL (ref 0.66–1.25)
CREAT SERPL-MCNC: 1.86 MG/DL (ref 0.66–1.25)
CREAT SERPL-MCNC: 1.91 MG/DL (ref 0.66–1.25)
DIFFERENTIAL METHOD BLD: ABNORMAL
EOSINOPHIL # BLD AUTO: 0 10E9/L (ref 0–0.7)
EOSINOPHIL NFR BLD AUTO: 0 %
ERYTHROCYTE [DISTWIDTH] IN BLOOD BY AUTOMATED COUNT: 15.9 % (ref 10–15)
ERYTHROCYTE [DISTWIDTH] IN BLOOD BY AUTOMATED COUNT: 16 % (ref 10–15)
ERYTHROCYTE [DISTWIDTH] IN BLOOD BY AUTOMATED COUNT: 16.5 % (ref 10–15)
GFR SERPL CREATININE-BSD FRML MDRD: 38 ML/MIN/{1.73_M2}
GFR SERPL CREATININE-BSD FRML MDRD: 40 ML/MIN/{1.73_M2}
GFR SERPL CREATININE-BSD FRML MDRD: 42 ML/MIN/{1.73_M2}
GFR SERPL CREATININE-BSD FRML MDRD: 43 ML/MIN/{1.73_M2}
GLUCOSE BLDC GLUCOMTR-MCNC: 74 MG/DL (ref 70–99)
GLUCOSE BLDC GLUCOMTR-MCNC: 78 MG/DL (ref 70–99)
GLUCOSE BLDC GLUCOMTR-MCNC: 80 MG/DL (ref 70–99)
GLUCOSE BLDC GLUCOMTR-MCNC: 85 MG/DL (ref 70–99)
GLUCOSE BLDC GLUCOMTR-MCNC: 88 MG/DL (ref 70–99)
GLUCOSE SERPL-MCNC: 80 MG/DL (ref 70–99)
GLUCOSE SERPL-MCNC: 81 MG/DL (ref 70–99)
GLUCOSE SERPL-MCNC: 91 MG/DL (ref 70–99)
GLUCOSE SERPL-MCNC: 99 MG/DL (ref 70–99)
HCO3 BLDV-SCNC: 20 MMOL/L (ref 21–28)
HCT VFR BLD AUTO: 22.6 % (ref 40–53)
HCT VFR BLD AUTO: 23.1 % (ref 40–53)
HCT VFR BLD AUTO: 24 % (ref 40–53)
HGB BLD-MCNC: 7.3 G/DL (ref 13.3–17.7)
HGB BLD-MCNC: 7.5 G/DL (ref 13.3–17.7)
HGB BLD-MCNC: 7.7 G/DL (ref 13.3–17.7)
IMM GRANULOCYTES # BLD: 0.3 10E9/L (ref 0–0.4)
IMM GRANULOCYTES NFR BLD: 1.9 %
IMM GRANULOCYTES NFR BLD: 2.3 %
IMM GRANULOCYTES NFR BLD: 2.6 %
INR PPP: 1.5 (ref 0.86–1.14)
LACTATE BLD-SCNC: 0.7 MMOL/L (ref 0.7–2)
LMWH PPP CHRO-ACNC: 0.22 IU/ML
LYMPHOCYTES # BLD AUTO: 0.3 10E9/L (ref 0.8–5.3)
LYMPHOCYTES # BLD AUTO: 0.4 10E9/L (ref 0.8–5.3)
LYMPHOCYTES # BLD AUTO: 0.4 10E9/L (ref 0.8–5.3)
LYMPHOCYTES NFR BLD AUTO: 2.6 %
LYMPHOCYTES NFR BLD AUTO: 2.8 %
LYMPHOCYTES NFR BLD AUTO: 3.1 %
MAGNESIUM SERPL-MCNC: 2.5 MG/DL (ref 1.6–2.3)
MAGNESIUM SERPL-MCNC: 2.6 MG/DL (ref 1.6–2.3)
MAGNESIUM SERPL-MCNC: 2.6 MG/DL (ref 1.6–2.3)
MAGNESIUM SERPL-MCNC: 2.7 MG/DL (ref 1.6–2.3)
MCH RBC QN AUTO: 29.8 PG (ref 26.5–33)
MCH RBC QN AUTO: 29.8 PG (ref 26.5–33)
MCH RBC QN AUTO: 30.9 PG (ref 26.5–33)
MCHC RBC AUTO-ENTMCNC: 31.6 G/DL (ref 31.5–36.5)
MCHC RBC AUTO-ENTMCNC: 32.1 G/DL (ref 31.5–36.5)
MCHC RBC AUTO-ENTMCNC: 33.2 G/DL (ref 31.5–36.5)
MCV RBC AUTO: 93 FL (ref 78–100)
MCV RBC AUTO: 93 FL (ref 78–100)
MCV RBC AUTO: 94 FL (ref 78–100)
MONOCYTES # BLD AUTO: 0.3 10E9/L (ref 0–1.3)
MONOCYTES # BLD AUTO: 0.6 10E9/L (ref 0–1.3)
MONOCYTES # BLD AUTO: 0.6 10E9/L (ref 0–1.3)
MONOCYTES NFR BLD AUTO: 2.6 %
MONOCYTES NFR BLD AUTO: 4.3 %
MONOCYTES NFR BLD AUTO: 4.4 %
NEUTROPHILS # BLD AUTO: 11.5 10E9/L (ref 1.6–8.3)
NEUTROPHILS # BLD AUTO: 11.9 10E9/L (ref 1.6–8.3)
NEUTROPHILS # BLD AUTO: 12.4 10E9/L (ref 1.6–8.3)
NEUTROPHILS NFR BLD AUTO: 89.8 %
NEUTROPHILS NFR BLD AUTO: 90.9 %
NEUTROPHILS NFR BLD AUTO: 92.4 %
NRBC # BLD AUTO: 0.2 10*3/UL
NRBC # BLD AUTO: 0.4 10*3/UL
NRBC # BLD AUTO: 0.5 10*3/UL
NRBC BLD AUTO-RTO: 2 /100
NRBC BLD AUTO-RTO: 3 /100
NRBC BLD AUTO-RTO: 4 /100
O2/TOTAL GAS SETTING VFR VENT: 21 %
PCO2 BLDV: 38 MM HG (ref 40–50)
PH BLDV: 7.33 PH (ref 7.32–7.43)
PHOSPHATE SERPL-MCNC: 4.9 MG/DL (ref 2.5–4.5)
PHOSPHATE SERPL-MCNC: 5.3 MG/DL (ref 2.5–4.5)
PHOSPHATE SERPL-MCNC: 5.3 MG/DL (ref 2.5–4.5)
PHOSPHATE SERPL-MCNC: 5.5 MG/DL (ref 2.5–4.5)
PLATELET # BLD AUTO: 113 10E9/L (ref 150–450)
PLATELET # BLD AUTO: 118 10E9/L (ref 150–450)
PLATELET # BLD AUTO: 125 10E9/L (ref 150–450)
PO2 BLDV: 40 MM HG (ref 25–47)
POTASSIUM SERPL-SCNC: 4.2 MMOL/L (ref 3.4–5.3)
POTASSIUM SERPL-SCNC: 4.3 MMOL/L (ref 3.4–5.3)
POTASSIUM SERPL-SCNC: 4.3 MMOL/L (ref 3.4–5.3)
POTASSIUM SERPL-SCNC: 4.4 MMOL/L (ref 3.4–5.3)
PROT SERPL-MCNC: 4.9 G/DL (ref 6.8–8.8)
PROT SERPL-MCNC: 5 G/DL (ref 6.8–8.8)
PROT SERPL-MCNC: 5 G/DL (ref 6.8–8.8)
PROT SERPL-MCNC: 5.1 G/DL (ref 6.8–8.8)
RBC # BLD AUTO: 2.43 10E12/L (ref 4.4–5.9)
RBC # BLD AUTO: 2.45 10E12/L (ref 4.4–5.9)
RBC # BLD AUTO: 2.58 10E12/L (ref 4.4–5.9)
SODIUM SERPL-SCNC: 136 MMOL/L (ref 133–144)
SODIUM SERPL-SCNC: 137 MMOL/L (ref 133–144)
SODIUM SERPL-SCNC: 137 MMOL/L (ref 133–144)
SODIUM SERPL-SCNC: 138 MMOL/L (ref 133–144)
TACROLIMUS BLD-MCNC: 13.7 UG/L (ref 5–15)
TME LAST DOSE: NORMAL H
WBC # BLD AUTO: 12.8 10E9/L (ref 4–11)
WBC # BLD AUTO: 12.9 10E9/L (ref 4–11)
WBC # BLD AUTO: 13.6 10E9/L (ref 4–11)

## 2019-03-11 PROCEDURE — 40000014 ZZH STATISTIC ARTERIAL MONITORING DAILY

## 2019-03-11 PROCEDURE — 90947 DIALYSIS REPEATED EVAL: CPT

## 2019-03-11 PROCEDURE — 40000986 XR ABDOMEN PORT 1 VW

## 2019-03-11 PROCEDURE — 25000132 ZZH RX MED GY IP 250 OP 250 PS 637: Performed by: INTERNAL MEDICINE

## 2019-03-11 PROCEDURE — 99233 SBSQ HOSP IP/OBS HIGH 50: CPT | Mod: 25 | Performed by: INTERNAL MEDICINE

## 2019-03-11 PROCEDURE — 83735 ASSAY OF MAGNESIUM: CPT | Performed by: PHYSICIAN ASSISTANT

## 2019-03-11 PROCEDURE — C1894 INTRO/SHEATH, NON-LASER: HCPCS | Performed by: INTERNAL MEDICINE

## 2019-03-11 PROCEDURE — 88307 TISSUE EXAM BY PATHOLOGIST: CPT | Performed by: INTERNAL MEDICINE

## 2019-03-11 PROCEDURE — 85025 COMPLETE CBC W/AUTO DIFF WBC: CPT | Performed by: GENERAL ACUTE CARE HOSPITAL

## 2019-03-11 PROCEDURE — 25000125 ZZHC RX 250: Performed by: INTERNAL MEDICINE

## 2019-03-11 PROCEDURE — 85610 PROTHROMBIN TIME: CPT | Performed by: PHYSICIAN ASSISTANT

## 2019-03-11 PROCEDURE — 82330 ASSAY OF CALCIUM: CPT | Performed by: GENERAL ACUTE CARE HOSPITAL

## 2019-03-11 PROCEDURE — 85025 COMPLETE CBC W/AUTO DIFF WBC: CPT | Performed by: PHYSICIAN ASSISTANT

## 2019-03-11 PROCEDURE — 25000125 ZZHC RX 250

## 2019-03-11 PROCEDURE — 25000132 ZZH RX MED GY IP 250 OP 250 PS 637: Performed by: STUDENT IN AN ORGANIZED HEALTH CARE EDUCATION/TRAINING PROGRAM

## 2019-03-11 PROCEDURE — 80053 COMPREHEN METABOLIC PANEL: CPT | Performed by: PHYSICIAN ASSISTANT

## 2019-03-11 PROCEDURE — 25000131 ZZH RX MED GY IP 250 OP 636 PS 637: Performed by: INTERNAL MEDICINE

## 2019-03-11 PROCEDURE — 40000196 ZZH STATISTIC RAPCV CVP MONITORING

## 2019-03-11 PROCEDURE — 25800030 ZZH RX IP 258 OP 636: Performed by: GENERAL ACUTE CARE HOSPITAL

## 2019-03-11 PROCEDURE — 88350 IMFLUOR EA ADDL 1ANTB STN PX: CPT | Performed by: INTERNAL MEDICINE

## 2019-03-11 PROCEDURE — 25000132 ZZH RX MED GY IP 250 OP 250 PS 637: Performed by: SURGERY

## 2019-03-11 PROCEDURE — 25000125 ZZHC RX 250: Performed by: STUDENT IN AN ORGANIZED HEALTH CARE EDUCATION/TRAINING PROGRAM

## 2019-03-11 PROCEDURE — 85520 HEPARIN ASSAY: CPT | Performed by: PHYSICIAN ASSISTANT

## 2019-03-11 PROCEDURE — 93505 ENDOMYOCARDIAL BIOPSY: CPT | Performed by: INTERNAL MEDICINE

## 2019-03-11 PROCEDURE — 93010 ELECTROCARDIOGRAM REPORT: CPT | Performed by: INTERNAL MEDICINE

## 2019-03-11 PROCEDURE — 83605 ASSAY OF LACTIC ACID: CPT | Performed by: STUDENT IN AN ORGANIZED HEALTH CARE EDUCATION/TRAINING PROGRAM

## 2019-03-11 PROCEDURE — 84100 ASSAY OF PHOSPHORUS: CPT | Performed by: GENERAL ACUTE CARE HOSPITAL

## 2019-03-11 PROCEDURE — 40000048 ZZH STATISTIC DAILY SWAN MONITORING

## 2019-03-11 PROCEDURE — 25000128 H RX IP 250 OP 636: Performed by: INTERNAL MEDICINE

## 2019-03-11 PROCEDURE — 84100 ASSAY OF PHOSPHORUS: CPT | Performed by: PHYSICIAN ASSISTANT

## 2019-03-11 PROCEDURE — 80197 ASSAY OF TACROLIMUS: CPT | Performed by: INTERNAL MEDICINE

## 2019-03-11 PROCEDURE — 25000125 ZZHC RX 250: Performed by: GENERAL ACUTE CARE HOSPITAL

## 2019-03-11 PROCEDURE — 27210794 ZZH OR GENERAL SUPPLY STERILE: Performed by: INTERNAL MEDICINE

## 2019-03-11 PROCEDURE — 80053 COMPREHEN METABOLIC PANEL: CPT | Performed by: GENERAL ACUTE CARE HOSPITAL

## 2019-03-11 PROCEDURE — 83735 ASSAY OF MAGNESIUM: CPT | Performed by: GENERAL ACUTE CARE HOSPITAL

## 2019-03-11 PROCEDURE — 71045 X-RAY EXAM CHEST 1 VIEW: CPT

## 2019-03-11 PROCEDURE — 20000004 ZZH R&B ICU UMMC

## 2019-03-11 PROCEDURE — 93505 ENDOMYOCARDIAL BIOPSY: CPT | Mod: 26 | Performed by: INTERNAL MEDICINE

## 2019-03-11 PROCEDURE — 25800030 ZZH RX IP 258 OP 636: Performed by: INTERNAL MEDICINE

## 2019-03-11 PROCEDURE — 88346 IMFLUOR 1ST 1ANTB STAIN PX: CPT | Performed by: INTERNAL MEDICINE

## 2019-03-11 PROCEDURE — 02BK3ZX EXCISION OF RIGHT VENTRICLE, PERCUTANEOUS APPROACH, DIAGNOSTIC: ICD-10-PCS | Performed by: INTERNAL MEDICINE

## 2019-03-11 PROCEDURE — 44500 INTRO GASTROINTESTINAL TUBE: CPT

## 2019-03-11 PROCEDURE — 27210429 ZZH NUTRITION PRODUCT INTERMEDIATE LITER

## 2019-03-11 PROCEDURE — 40000275 ZZH STATISTIC RCP TIME EA 10 MIN

## 2019-03-11 PROCEDURE — 82330 ASSAY OF CALCIUM: CPT | Performed by: PHYSICIAN ASSISTANT

## 2019-03-11 PROCEDURE — 00000146 ZZHCL STATISTIC GLUCOSE BY METER IP

## 2019-03-11 PROCEDURE — 82803 BLOOD GASES ANY COMBINATION: CPT | Performed by: STUDENT IN AN ORGANIZED HEALTH CARE EDUCATION/TRAINING PROGRAM

## 2019-03-11 PROCEDURE — 93005 ELECTROCARDIOGRAM TRACING: CPT

## 2019-03-11 RX ORDER — EPTIFIBATIDE 2 MG/ML
1 INJECTION, SOLUTION INTRAVENOUS CONTINUOUS PRN
Status: DISCONTINUED | OUTPATIENT
Start: 2019-03-11 | End: 2019-03-11 | Stop reason: HOSPADM

## 2019-03-11 RX ORDER — DOBUTAMINE HYDROCHLORIDE 200 MG/100ML
2-20 INJECTION INTRAVENOUS CONTINUOUS PRN
Status: DISCONTINUED | OUTPATIENT
Start: 2019-03-11 | End: 2019-03-11 | Stop reason: HOSPADM

## 2019-03-11 RX ORDER — B COMPLEX C NO.10/FOLIC ACID 900MCG/5ML
5 LIQUID (ML) ORAL DAILY
Status: DISCONTINUED | OUTPATIENT
Start: 2019-03-11 | End: 2019-03-22

## 2019-03-11 RX ORDER — DOBUTAMINE HYDROCHLORIDE 200 MG/100ML
5-40 INJECTION INTRAVENOUS CONTINUOUS PRN
Status: DISCONTINUED | OUTPATIENT
Start: 2019-03-11 | End: 2019-03-11 | Stop reason: HOSPADM

## 2019-03-11 RX ORDER — ARGATROBAN 1 MG/ML
350 INJECTION, SOLUTION INTRAVENOUS
Status: DISCONTINUED | OUTPATIENT
Start: 2019-03-11 | End: 2019-03-11 | Stop reason: HOSPADM

## 2019-03-11 RX ORDER — NOREPINEPHRINE BITARTRATE/D5W 16MG/250ML
.03-.4 PLASTIC BAG, INJECTION (ML) INTRAVENOUS CONTINUOUS PRN
Status: DISCONTINUED | OUTPATIENT
Start: 2019-03-11 | End: 2019-03-11 | Stop reason: HOSPADM

## 2019-03-11 RX ORDER — TACROLIMUS 0.5 MG/1
0.5 CAPSULE ORAL
Status: DISCONTINUED | OUTPATIENT
Start: 2019-03-11 | End: 2019-03-13

## 2019-03-11 RX ORDER — EPTIFIBATIDE 2 MG/ML
2 INJECTION, SOLUTION INTRAVENOUS CONTINUOUS PRN
Status: DISCONTINUED | OUTPATIENT
Start: 2019-03-11 | End: 2019-03-11 | Stop reason: HOSPADM

## 2019-03-11 RX ORDER — ARGATROBAN 1 MG/ML
150 INJECTION, SOLUTION INTRAVENOUS
Status: DISCONTINUED | OUTPATIENT
Start: 2019-03-11 | End: 2019-03-11 | Stop reason: HOSPADM

## 2019-03-11 RX ORDER — QUETIAPINE FUMARATE 25 MG/1
25 TABLET, FILM COATED ORAL AT BEDTIME
Status: DISCONTINUED | OUTPATIENT
Start: 2019-03-11 | End: 2019-03-11

## 2019-03-11 RX ORDER — VALGANCICLOVIR HYDROCHLORIDE 50 MG/ML
450 POWDER, FOR SOLUTION ORAL EVERY OTHER DAY
Status: DISCONTINUED | OUTPATIENT
Start: 2019-03-12 | End: 2019-03-13

## 2019-03-11 RX ORDER — TACROLIMUS 0.5 MG/1
0.5 CAPSULE ORAL
Status: DISCONTINUED | OUTPATIENT
Start: 2019-03-11 | End: 2019-03-11

## 2019-03-11 RX ORDER — NITROGLYCERIN 20 MG/100ML
.07-2 INJECTION INTRAVENOUS CONTINUOUS PRN
Status: DISCONTINUED | OUTPATIENT
Start: 2019-03-11 | End: 2019-03-11 | Stop reason: HOSPADM

## 2019-03-11 RX ORDER — DOPAMINE HYDROCHLORIDE 160 MG/100ML
2-20 INJECTION, SOLUTION INTRAVENOUS CONTINUOUS PRN
Status: DISCONTINUED | OUTPATIENT
Start: 2019-03-11 | End: 2019-03-11 | Stop reason: HOSPADM

## 2019-03-11 RX ORDER — EPTIFIBATIDE 2 MG/ML
180 INJECTION, SOLUTION INTRAVENOUS EVERY 10 MIN PRN
Status: DISCONTINUED | OUTPATIENT
Start: 2019-03-11 | End: 2019-03-11 | Stop reason: HOSPADM

## 2019-03-11 RX ORDER — MYCOPHENOLATE MOFETIL 200 MG/ML
1000 POWDER, FOR SUSPENSION ORAL 2 TIMES DAILY
Status: DISCONTINUED | OUTPATIENT
Start: 2019-03-11 | End: 2019-03-12

## 2019-03-11 RX ADMIN — MYCOPHENOLATE MOFETIL 1000 MG: 200 POWDER, FOR SUSPENSION ORAL at 19:46

## 2019-03-11 RX ADMIN — CALCIUM CHLORIDE, MAGNESIUM CHLORIDE, SODIUM CHLORIDE, SODIUM BICARBONATE, POTASSIUM CHLORIDE AND SODIUM PHOSPHATE DIBASIC DIHYDRATE 12.5 ML/KG/HR: 3.68; 3.05; 6.34; 3.09; .314; .187 INJECTION INTRAVENOUS at 17:21

## 2019-03-11 RX ADMIN — ROSUVASTATIN CALCIUM 10 MG: 10 TABLET, FILM COATED ORAL at 08:36

## 2019-03-11 RX ADMIN — CALCIUM CHLORIDE, MAGNESIUM CHLORIDE, SODIUM CHLORIDE, SODIUM BICARBONATE, POTASSIUM CHLORIDE AND SODIUM PHOSPHATE DIBASIC DIHYDRATE 12.5 ML/KG/HR: 3.68; 3.05; 6.34; 3.09; .314; .187 INJECTION INTRAVENOUS at 05:37

## 2019-03-11 RX ADMIN — CALCIUM CHLORIDE, MAGNESIUM CHLORIDE, SODIUM CHLORIDE, SODIUM BICARBONATE, POTASSIUM CHLORIDE AND SODIUM PHOSPHATE DIBASIC DIHYDRATE 3.66 ML/KG/HR: 3.68; 3.05; 6.34; 3.09; .314; .187 INJECTION INTRAVENOUS at 17:21

## 2019-03-11 RX ADMIN — TERBUTALINE SULFATE 10 MG: 5 TABLET ORAL at 08:38

## 2019-03-11 RX ADMIN — DULOXETINE 20 MG: 20 CAPSULE, DELAYED RELEASE ORAL at 08:52

## 2019-03-11 RX ADMIN — CALCIUM CHLORIDE 1 G: 100 INJECTION, SOLUTION INTRAVENOUS at 11:20

## 2019-03-11 RX ADMIN — Medication 5 ML: at 17:53

## 2019-03-11 RX ADMIN — PIPERACILLIN AND TAZOBACTAM 4.5 G: 4; .5 INJECTION, POWDER, FOR SOLUTION INTRAVENOUS at 19:53

## 2019-03-11 RX ADMIN — Medication 0.5 MG: at 19:02

## 2019-03-11 RX ADMIN — CALCIUM CHLORIDE, MAGNESIUM CHLORIDE, SODIUM CHLORIDE, SODIUM BICARBONATE, POTASSIUM CHLORIDE AND SODIUM PHOSPHATE DIBASIC DIHYDRATE 3.66 ML/KG/HR: 3.68; 3.05; 6.34; 3.09; .314; .187 INJECTION INTRAVENOUS at 05:32

## 2019-03-11 RX ADMIN — CALCIUM CHLORIDE, MAGNESIUM CHLORIDE, SODIUM CHLORIDE, SODIUM BICARBONATE, POTASSIUM CHLORIDE AND SODIUM PHOSPHATE DIBASIC DIHYDRATE 12.5 ML/KG/HR: 3.68; 3.05; 6.34; 3.09; .314; .187 INJECTION INTRAVENOUS at 12:59

## 2019-03-11 RX ADMIN — FLUTICASONE PROPIONATE 2 PUFF: 220 AEROSOL, METERED RESPIRATORY (INHALATION) at 08:44

## 2019-03-11 RX ADMIN — SILDENAFIL 20 MG: 20 TABLET ORAL at 19:46

## 2019-03-11 RX ADMIN — LEVOTHYROXINE SODIUM 100 MCG: 100 TABLET ORAL at 08:37

## 2019-03-11 RX ADMIN — CALCIUM CHLORIDE, MAGNESIUM CHLORIDE, SODIUM CHLORIDE, SODIUM BICARBONATE, POTASSIUM CHLORIDE AND SODIUM PHOSPHATE DIBASIC DIHYDRATE 12.5 ML/KG/HR: 3.68; 3.05; 6.34; 3.09; .314; .187 INJECTION INTRAVENOUS at 05:36

## 2019-03-11 RX ADMIN — ACETAMINOPHEN 650 MG: 325 TABLET, FILM COATED ORAL at 19:58

## 2019-03-11 RX ADMIN — RANITIDINE 150 MG: 150 TABLET ORAL at 08:36

## 2019-03-11 RX ADMIN — MESALAMINE 2400 MG: 800 TABLET, DELAYED RELEASE ORAL at 08:37

## 2019-03-11 RX ADMIN — MYCOPHENOLATE MOFETIL 1000 MG: 500 TABLET ORAL at 08:37

## 2019-03-11 RX ADMIN — TERBUTALINE SULFATE 10 MG: 5 TABLET ORAL at 17:26

## 2019-03-11 RX ADMIN — Medication 500000 UNITS: at 19:54

## 2019-03-11 RX ADMIN — SILDENAFIL 20 MG: 20 TABLET ORAL at 08:36

## 2019-03-11 RX ADMIN — PREDNISONE 20 MG: 20 TABLET ORAL at 08:36

## 2019-03-11 RX ADMIN — MONTELUKAST SODIUM 10 MG: 10 TABLET, COATED ORAL at 21:42

## 2019-03-11 RX ADMIN — SILDENAFIL 20 MG: 20 TABLET ORAL at 14:03

## 2019-03-11 RX ADMIN — TERBUTALINE SULFATE 10 MG: 5 TABLET ORAL at 23:56

## 2019-03-11 RX ADMIN — PIPERACILLIN AND TAZOBACTAM 4.5 G: 4; .5 INJECTION, POWDER, FOR SOLUTION INTRAVENOUS at 08:03

## 2019-03-11 RX ADMIN — MESALAMINE 2400 MG: 800 TABLET, DELAYED RELEASE ORAL at 19:40

## 2019-03-11 RX ADMIN — Medication 500000 UNITS: at 08:38

## 2019-03-11 RX ADMIN — FLUTICASONE PROPIONATE 2 PUFF: 220 AEROSOL, METERED RESPIRATORY (INHALATION) at 19:44

## 2019-03-11 RX ADMIN — PIPERACILLIN AND TAZOBACTAM 4.5 G: 4; .5 INJECTION, POWDER, FOR SOLUTION INTRAVENOUS at 02:02

## 2019-03-11 RX ADMIN — Medication 500000 UNITS: at 14:03

## 2019-03-11 RX ADMIN — HEPARIN SODIUM 750 UNITS/HR: 10000 INJECTION, SOLUTION INTRAVENOUS at 04:33

## 2019-03-11 RX ADMIN — PIPERACILLIN AND TAZOBACTAM 4.5 G: 4; .5 INJECTION, POWDER, FOR SOLUTION INTRAVENOUS at 14:03

## 2019-03-11 RX ADMIN — MELATONIN TAB 3 MG 3 MG: 3 TAB at 19:46

## 2019-03-11 RX ADMIN — LIDOCAINE HYDROCHLORIDE 5 ML: 20 SOLUTION ORAL; TOPICAL at 11:40

## 2019-03-11 RX ADMIN — VANCOMYCIN HYDROCHLORIDE 1250 MG: 10 INJECTION, POWDER, LYOPHILIZED, FOR SOLUTION INTRAVENOUS at 09:02

## 2019-03-11 RX ADMIN — PREDNISONE 15 MG: 10 TABLET ORAL at 19:46

## 2019-03-11 RX ADMIN — ACETAMINOPHEN 650 MG: 325 TABLET, FILM COATED ORAL at 04:12

## 2019-03-11 ASSESSMENT — ACTIVITIES OF DAILY LIVING (ADL)
ADLS_ACUITY_SCORE: 15
ADLS_ACUITY_SCORE: 15
ADLS_ACUITY_SCORE: 17
ADLS_ACUITY_SCORE: 15
ADLS_ACUITY_SCORE: 15

## 2019-03-11 ASSESSMENT — MIFFLIN-ST. JEOR: SCORE: 1429.5

## 2019-03-11 ASSESSMENT — PAIN DESCRIPTION - DESCRIPTORS: DESCRIPTORS: PATIENT UNABLE TO DESCRIBE

## 2019-03-11 NOTE — PHARMACY-CONSULT NOTE
Pharmacy Tube Feeding Consult    Medication reviewed for administration by feeding tube and for potential food/drug interactions.    Recommendation: No changes are needed at this time.     Pharmacy will continue to follow as new medications are ordered.    Elida Barragan, PharmD  Pager [phone]

## 2019-03-11 NOTE — PROCEDURES
PRELIMINARY CARDIAC CATH REPORT:     PROCEDURES PERFORMED:   Endomyocardial Biopsy  Right Heart Catheterization    PHYSICIANS:  Attending Physician: Alton Solomon MD  Interventional Cardiology Fellow: Jackson Patten    INDICATION:  Everton Larios is a 53 year old gentleman with a history of OHT on 2/24/19 for NICM and cardiogenic shock presenting for endomyocardial biopsy and right heart catheterization.    DESCRIPTION:  1. Consent obtained with discussion of risks.  All questions were answered.  2. Sterile prep and procedure.  3. Location with Sheaths:   Rt Femoral Venous 7 Fr   Lf IJ  7 Fr (IN THE FUTURE DO NOT USE LEFT INTERNAL JUGULAR VENOUS ACCESS DUE TO A GORETEX GRAFT THAT CONNECTS FROM THE LEFT PERSISTENT SVC TO THE RIGHT ATRIUM).  4. Access: Local anesthetic with lidocaine.  A micropuncture 21 guage needle with ultrasound guidance was used to establish vascular access using a modified Seldinger technique.  5. Diagnostic Catheters:   7 Fr  Kim[place] Liz  6. Guiding Catheters:  None  6. Estimated blood loss: < 25 ml    MEDICATIONS:  The procedure was performed under local anesthetic.  Heart rate, BP, respiration, oxygen saturation and patient responses were monitored throughout the procedure with the assistance of the RN under my supervision.    Procedures:    ENDOMYOCARDIAL BIOPSY:  1. Successful collection of 5 right ventricular endomyocardial biopsy samples using the Jawz.    HEMODYNAMICS:  BSA 1.7  1. HR 96 bpm  2. /66/87 mmHg  3. RA 27/23/17   4. RV 40/15  5. PA 40/19/30   6. PCW 19   7. PA sat 52.5%   8. PCW sat 96.1%  9. Hgb 7.5 g/dL   10. Jaden CO 4.7   11. Jaden CI 2.8   12. TD CO 5.0   13. TD CI 2.9   14. PVR 2.4      Sheath Removal:  The right internal jugular vein and right common femoral vein sheath was manually removed in the cardiac catheterization laboratory.    Contrast: Isovue, 0 ml     Fluoroscopy Time: 13.6 min    COMPLICATIONS:  1. None    SUMMARY:   Elevated left and right  sided filling pressures  Mild secondary pulmonary hypertension  Normal cardiac output  Successful five endomyocardial biopsies    PLAN:   >> Transplant team to follow up the results of the biopsies.  >> Bedrest per protocol.  >>. Return to the primary inpatient team for further evaluation and management.    The attending interventional cardiologist was present and supervised all critical aspects the procedure.    Findings discussed with primary team.    See CVIS report for final draft.    Jackson Patten   Cardiology Fellow    Alton Solomon   Cardiology Staff

## 2019-03-11 NOTE — PLAN OF CARE
"  D: admitted 2/17 w/worsening heart failure; 2/24 heart transplant; 3/8 CRRT initiated.      A/I:   Neuro/psych: very confused. Oriented to self, and intermittently to being in a medical setting. Slept one hour despite being awake all day, and many different approaches to promote sleep. When alone in room will converse with people who are not there. Constantly fidgets with lines, tubes, bedding, frequently throws eulalio hugger off despite reporting feeling cold. Becomes quite upset when redirected away from equipment. Now requiring constant attention for safety.    CV: HR high 90s. MAPs 60-80s.   Pulm: room air. MATA. Lungs now CTA in uppers, RLL with intermittent fine crackles/diminished. Unable to learn incentive spirometer. Tried to drink it like a water glass, then became angry when he couldn't get any water from it.   ID: 96.2 - 96.9 axillary. Pt unable to follow command to keep a thermometer probe under tongue.   GI: ate a whole cup of jello supplement with much encouragement. NPO since midnight. Calorie counts ended 3/10. Continues to c/o intermittent \"stomach upset\" with gas discomfort. Producing gas, but no stools overnight.   : UOP 0-1mL/hr. CRRT I=O.  Lines: R IJ CRRT. L DL PICC. PIV x1.  Gtts: Dobutamine 2.5 mcg/kg/min. Heparin remains therapeutic @750 units/hr. TKO.  Skin/incisions/chest tubes: Sternal/chest incisions intact/ELEONORA. chest tube dressings with scant drainage from sites, will keep covered. Sacral mepilex soiled, removed.   Labs: see results. no interventions.      P: heart cath today, time not yet scheduled. Parents to sign consent this morning. Continue CRRT with plans for possible HD.        "

## 2019-03-11 NOTE — PROGRESS NOTES
CV ICU PROGRESS NOTE  March 11, 2019      CO-MORBIDITIES:   Chronic systolic heart failure (H)  (primary encounter diagnosis)  Acute on chronic systolic congestive heart failure (H)    ASSESSMENT: Everton Larios is a 53 year old male with PMH etoh abuse, hypothyroidism, intermittent asthma, ulcerative colitis, Depression, Chronic HFrEF, NICM admitted with cardiogenic shock requiring subclavian balloon pump now s/p OHT 2/24/19 with Piyush House and Christopher.      TODAY'S PROGRESS:   - Heparin drip held for procedure   - R heart cath and biopsy today   - Start PM Seroquel (check EKG first)   - Check VBG and lactate   - Bedside NJ feeding tube placement  - Will likely stop antibiotics (plan to discuss with cardiology)   - Remove maddox, straight cath as needed   - Discontinue sliding scale insulin    PLAN:  Neuro/ pain/ sedation:  - Monitor neurological status. Notify the MD for any acute changes in exam.  - Tylenol prn for pain. Hold narcotics for altered mental status.    #ICU delirium  - Head CT 3/9 negative  - Check VBG and lactate  - Optimize sleep/wake cycle, melatonin for sleep, will schedule seroquel pm       #Depression  - PTA cymbalta 20mg     Pulmonary care:   - Doing well on room air  - Supp oxygen to maintain SpO2 >92%  - Encourage IS and deep breathing  - Chest tubes to suction.     #Intermittent asthma  - PTA Montelukast ordered    Cardiovascular: HR goal >95. Echo 3/5 with good LV function, mild decreased RV function (moderately dilated).  - Monitor hemodynamic status.   - MAP >65. HR goal > 95.  - Dobutamine 2.5 mcg/kg/min, sildenafil 20 q8h, terbutaline 10 mg q8h  - Continue statin. No aspirin per cards.   - Pacing wires capped.   - R heart cath and biopsy 3/11.    GI care:   #Severe malnutrition  - Regular diet  - Calorie counts low- poor oral intake. Will place bedside NJ today to start enteral nutrition.  - Senna, miralax and suppository for bowel regimen; MoM and miralax prn     #Intraperitoneal  free air (CT obtained 2/26)   - Small peritoneal defect made during redo operation, closed with stitches intra-op- likely source of free air. Resolved.    #Pierce's esophagus  #Ulcerative colitis  -PTA Mesalamine     Fluids/ Electrolytes/ Nutrition:   - TKO for IV fluid hydration  - NJ placement today to initiate enteral nutrition as above.  - No indication for parenteral nutrition.  - Replace electrolytes as needed.  #Hyponatremia  - Asymptomatic/improved on CRRT    Renal/ Fluid Balance:   #FRANKLIN, oliguric.    - Etiology likely cardiorenal from venous congestion vs nephrotoxicity from multiple medications  - CRRT started (3/9) to attempt fluid removal, possible iHD 3/11  - Will continue to monitor intake and output.  - PTA Tamsulosin      Endocrine:    # Stress hyperglycemia  - not requiring sliding scale insulin, discontinue  # hypothyroidism  - Home levothyroxine      ID/ Antibiotics: Perioperative antibiotics vanc/zosyn x 3 days- completed  #Leukocytosis, persistent since OR 2/24, afebrile   - BCx 3/9 x2 NGTD, CDiff 3/10/2019 negative  - BC and swan tip cultured 3/4 for leukocytosis- NGTD.   - Empirically started on Vanc/Zosyn 3/9 - will consider discontinuing today as he does not exhibit signs of infection, will discuss with cardiology.  - Valcyte for CMV prophylaxis; holding bactrim for PCP prophylaxis. Pentamidine given on 3/4.   - Nystatin for thrush     Heme:     #Bypass of persistent left SVC to R atrial appendage with [place] graft. Will require lifelong anti-coagulation for graft  - Hgb goal >7. Daily CBC.  - Hgb 6.8 3/9, transfused 1 u pRBC, stable 3/10/2019, no s/s bleeding   - Low intensity heparin gtt    Prophylaxis:    - Mechanical prophylaxis for DVT  - Heparin as above  - Protonix qd     Lines/ tubes/ drains:  - RIJ HD line 3/8, LUE PICC 3/9, L pleural chest tubes x2      Disposition:  - CV ICU.     Patient seen, findings and plan discussed with CV ICU staff, Dr. Sesay.     Leyla Bowling,  MD  General Surgery PGY-3  ====================================    SUBJECTIVE:   Intermittently confused/restless, only slept one hour overnight  Poor appetite, low calorie counts    OBJECTIVE:   1. VITAL SIGNS:   Temp:  [96.2  F (35.7  C)-97.3  F (36.3  C)] 96.3  F (35.7  C)  Heart Rate:  [89-98] 95  Resp:  [9-22] 17  BP: ()/(47-85) 108/66  SpO2:  [97 %-100 %] 99 %  Resp: 17      2. INTAKE/ OUTPUT:   I/O last 3 completed shifts:  In: 3097.9 [P.O.:1880; I.V.:1217.9]  Out: 3253 [Urine:43; Other:2645; Chest Tube:565]    3. PHYSICAL EXAMINATION:     General: appears somewhat restless in bed, deconditioned  Neuro: Oriented to person and place, moves all four extremities spontaneously   Resp: NLB on RA  CV: Regular rate, regular rhythm, serosanguinous output from L pleural chest tube   Abdomen: Soft, mildly distended, non-tender  Incisions: c/d/i, no erythema or drainage  Extremities: warm and well perfused     4. INVESTIGATIONS:   Arterial Blood Gases   No lab results found in last 7 days.  Complete Blood Count   Recent Labs   Lab 03/11/19  0956 03/11/19  0338 03/10/19  2201 03/10/19  1549   WBC 12.8* 13.6* 14.1* 13.6*   HGB 7.5* 7.7* 7.7* 8.2*   * 125* 130* 131*     Basic Metabolic Panel  Recent Labs   Lab 03/11/19  0956 03/11/19  0338 03/10/19  2201 03/10/19  1549    138 135 134   POTASSIUM 4.3 4.4 4.5 4.5   CHLORIDE 103 103 103 105   CO2 19* 18* 17* 17*   BUN 35* 39* 42* 46*   CR 1.77* 1.86* 1.91* 2.04*   GLC 81 91 74 82     Liver Function Tests  Recent Labs   Lab 03/11/19  0956 03/11/19  0338 03/10/19  2201 03/10/19  1549   AST 32 29 30 29   ALT 21 22 21 21   ALKPHOS 98 102 108 108   BILITOTAL 0.9 0.9 0.9 1.0   ALBUMIN 2.6* 2.8* 2.7* 2.6*   INR  --  1.50*  --   --      Pancreatic Enzymes  No lab results found in last 7 days.  Coagulation Profile  Recent Labs   Lab 03/11/19 0338   INR 1.50*         5. RADIOLOGY:   Recent Results (from the past 24 hour(s))   XR Chest Port 1 View    Narrative     Exam: XR CHEST PORT 1 VW, 3/11/2019 5:45 AM    Indication: confirm PA catheter position    Comparison: 3/9/2019    Findings:   Single portable view the chest. Right IJ central venous catheter  terminating in the low SVC. Left-sided PICC terminating in the left  SVC. Removal of the left IJ sheath. Sternotomy wires in place.  Bilateral chest tubes in similar position. Surgical staples projecting  over the left axilla. The cardiomediastinum and upper abdomen are  unchanged. No pleural effusion. No pneumothorax. Grossly unchanged  streaky bibasilar opacities.      Impression    Impression: Removal of the left IJ sheath, otherwise no significant  interval change.    I have personally reviewed the examination and initial interpretation  and I agree with the findings.    ZAC MIRZA MD       =========================================

## 2019-03-11 NOTE — PROGRESS NOTES
CRRT RESTART NOTE    Reason for Restart:  Return from CCL procedure  Error Code:  none    Patient s Vascular Access: Catheter                   Placement Confirmed:  YES  Manufacture:  State  Model:  Keegan  Length/Japanese Size:  20cm 12 fr  Flush Volume:  1.4/1.4    DATA:  Procedure:  CVVHDF  Start Time:  1750  Machine#:  11  Filter:  M150  Blood Flow:   180 mL/min  Pre-Replacement Solution: Phox BK4/2.5  Post-Replacement Solution:   Phox BK4/2.5  Dialysate Solution:   Phox BK4/2.5  Pre-Replacement Solution Rate: 700mL/hr  Post-Replacement Solution Rate:  200mL/hr  Dialysate Flow Rate:  700mL/hr  Patient Removal Rate:  100mL/hr  Anticoagulation Type and Rate:  n/a    ASSESSMENT:  How Patient Tolerated Restart:  well  Vital Signs: HR 93 OiT750% BP95/63 MAP72  Initial Pressures:  Access:  -44  Filter:  129  Return:  96  TMP:  44  Change in Filter Pressure:  101    INTERVENTIONS:  Restarted CRRT post procedure per Renal MD orders.     PLAN:  Continue plan of care.

## 2019-03-11 NOTE — PLAN OF CARE
OT: Pt check back per RN when OT attempted in AM, upon check back in PM, pt going to cath lab. OT will cx and reschedule for 3/12/19

## 2019-03-11 NOTE — PLAN OF CARE
PT 4A: Cancel- OT checked in with patient this AM, patient in cath lab this afternoon. Will reschedule.

## 2019-03-11 NOTE — PROGRESS NOTES
CRRT STATUS NOTE    DATA:  Time:  3:52 PM  Pressures WNL:  YES  Filter Status:  CRRT Filter Status WDL    Problems Reported/Alarms Noted:  none    Supplies Present:  YES    ASSESSMENT:  Patient Net Fluid Balance:  Net negative 433 since midnight   Vital Signs:  T 96.9 HR93 BP 95/56 MAP68 RR12  Labs:  K4.3 ICa 4.3 Lactic 0.7 Ph venous 7.33 Hgb 7.5  Goals of Therapy:  I=O    INTERVENTIONS: Pt remains on dobutamine gtt, able to maintain close to I=O. Pt off CRRT @1317 todayfor trip to [facility].      PLAN:  Renal planning to reevaluate need for CRRT upon return from [facility] this afternoon.

## 2019-03-11 NOTE — PROGRESS NOTES
CRRT STATUS NOTE    DATA:  Time:  6:13 AM  Pressures WNL:  YES  Filter Status:  WDL    Problems Reported/Alarms Noted:  none    Supplies Present:  YES    ASSESSMENT:  Patient Net Fluid Balance:  3/10: Net -287; 3/11: Net -60 since MN   Vital Signs:  T 96.2-96.9 ax, HR 93-96, MAP 67-82, RR 18-22, SPO2 98-99%  Labs:  K 4.4, Crt 1.86, Mg 2.7, Phos 5.3, iCa 4.4, HepXa 0.22  Goals of Therapy:  I=O, meeting goals at this time    INTERVENTIONS:   none    PLAN:  Heart cath today. Possible transition to HD. Continue fluid removal as tolerated per goals of therapy. Check circuit daily and change circuit q72h and prn. Please contact CRRT resource RN at [phone] with any questions/concerns.

## 2019-03-11 NOTE — PROGRESS NOTES
Calorie Count  Intake recorded for: 3/10  Total Kcals: 410 Total Protein: 32g  Kcals from Hospital Food: 410  Protein: 32g  Kcals from Outside Food (average):0 Protein: 0g  # Meals Recorded: 25% cheeseburger w/ fixings   # Supplements Recorded: 100% 1 Gelatein Plus, 45% 1 Boost Breeze

## 2019-03-11 NOTE — PROGRESS NOTES
CLINICAL NUTRITION SERVICES - REASSESSMENT NOTE     Nutrition Prescription    RECOMMENDATIONS FOR MDs/PROVIDERS TO ORDER:  None at this time    Malnutrition Status:    Severe malnutrition in the context of acute illness    Recommendations already ordered by Registered Dietitian (RD):  Type of Feeding Tube: NJ  Enteral Frequency:  Continuous  Enteral Regimen: Nutren 1.5 @ 15 mL/hr. With a goal of 55 mL/hr.   Total Enteral Provisions: 1980 kcals (36 kcal/kg/day), 90 g PRO (1.6 g/kg/day), 1003 mL H2O, 232 g CHO and no fiber daily.  Free Water Flush: 30 mL water flush q4h for tube patency   Add nephronex to ensure micronutrients on CRRT  Monitor K, phos, and Mg for refeeding. Hold advancement if K or Mg is less than normal or phos is less than 2.     Future/Additional Recommendations:  1. If K lab value is high may need to change formula:  -Nepro @ 45 mL/hr to provide 1944 kcal (35 kcal/kg), 87 g PRO (1.6 g/kg/day), 788 mL H2O, 174 g CHO and 14 g fiber.     2. If pt continues w/ high stool output w/ lack of response to fiber and medication changes (h/x of ulcerative colitis) recommend change formula:  - Peptamen 1.5 @ 55 mL/hr to provide 1980 kcal (36 kcal/kg), 90 g PRO (1.6 g/kg/day), 1016 mL H2O, 248 g CHO and no fiber daily.     3. Once TF at goal still having stool outputs recommend initiating nutrisource fiber 4 pkts per day.        EVALUATION OF THE PROGRESS TOWARD GOALS   Diet: NPO for procedure, previously on regular diet. Fluid restriction- 2000 mL.   Boost breeze BID between meals, gelatein plus BID between meals.  Nutrition Support: NJ tube placed 3/11    Intake:   Kcal counts 3/8-3/10: Average intake of 191 kcal (3.5 kcal/kg) and 16 g of protein (0.3 g of protein/kg).   Per RN flowsheet intake is % the last 5 days, 100% intake was of a gelatein supplement and not a full meal. RN report of poor appetite and intake.   Pt reports not drinking any supplements and eating small amounts of gelatein.     NEW  FINDINGS   Wt: Stable since last assessment, previously w/ wt loss of 8.3 kg, 13% weight loss.  GI: 0-4 BM per day- per RN having several loose stools  Renal: CRRT currently, per Nephrology note may be changing to HD  K 4.3 (NL), Phos 5.3 (H)    MALNUTRITION  % Intake: </= 50% for >/= 5 days (severe)  % Weight Loss: > 5% in 1 month (severe)  Subcutaneous Fat Loss: Facial region, Upper arm, Lower arm and Thoracic/intercostal:  Moderate  Muscle Loss: Temporal, Thoracic region (clavicle, acromium bone, deltoid, trapezius, pectoral), Lower arm  (forearm), Patellar region and Posterior calf: Moderate  Fluid Accumulation/Edema: None noted    Malnutrition Diagnosis: Severe malnutrition in the context of acute illness    Previous Goals   Patient to consume % of nutritionally adequate meal trays TID, or the equivalent with supplements/snacks.  Evaluation: Not met    Previous Nutrition Diagnosis  Predicted inadequate nutrient intake (protein/energy) related to potential for decreased appetite during hospital LOS    Evaluation: Declining    CURRENT NUTRITION DIAGNOSIS  Inadequate protein-energy intake related to poor appetite as evidenced by kcal counts documenting average intake of 3.5 kcal/kg and 0.3 grams of protein/kg and report of poor intake.     INTERVENTIONS  Implementation  Enteral Nutrition - Initiate as above  Collaboration w/ other providers- discussed in ICU rounds recommended placement of FT.   Add nephronex     Goals  Total avg nutritional intake to meet a minimum of 30 kcal/kg and 1.5 g PRO/kg daily (per dosing wt 55 kg).    Monitoring/Evaluation  Progress toward goals will be monitored and evaluated per protocol.    Cecilia Muir, Dietetic Intern    I have read and agree with the above reassessment and recommendations.  Amber Lemon, RD, LD, [facility]  CVICU Dietitian  Pager: [phone]

## 2019-03-11 NOTE — PLAN OF CARE
Neuro - patient is confused and disorientated. He knows his name and , but intermittently gets the month, year and place wrong. Patient follows commands and up to chair with assist of 1. Patient can be lethargic or very restless and pulling on lines/tubes.    Cardiac - Has external pacer wires. Keep HR> 90. 96 most of the day. Continues on dobutamine at 2.5 mcg/hr. BP stable with map > 65. Patient went to cath lab for Right heart cath and biopsy    Pulm - WNL    GI - large watery bm this afternoon. TF started at 15 as patient is not taking in enough calories.      - anuric. Paul discontinued. CRRT orders were I=O this am and now after cath lab orders at net .     Family updated to plan of care.

## 2019-03-11 NOTE — PROCEDURES
Small Bowel Feeding Tube Placement Assessment  Reason for Feeding Tube Placement: Team request for post-pyloric FT   Cortrak Start Time: 11:30   Cortrak End Time: 12:00  Medicine Delivered During Procedure: Lidocaine gel   Placement Successful: Presume post-pyloric (pending AXR confirmation).    Procedure Complications: None   Final Placement Duong at exit of nare 84 cm  Face to Face time with patient: 30 min    Anai Sharp RD, LD  CVICU RD Pager: [phone]

## 2019-03-11 NOTE — PROCEDURES
Bridle Placement:   Reason for bridle placement: Securement of FT    Medicine delivered during procedure: lidocaine gel   Procedure: Successful   Location of top of clip on FT: @ 84 cm marker   Condition of nose/skin at time of bridle placement: Unremarkable   Face to Face time with patient: < 5 minutes.    Anai Sharp RD, LD  CVICU RD Pager: [phone]

## 2019-03-11 NOTE — PROGRESS NOTES
CVTS PROGRESS NOTE  March 11, 2019      CO-MORBIDITIES:   Chronic systolic heart failure (H)  (primary encounter diagnosis)  Acute on chronic systolic congestive heart failure (H)    ASSESSMENT: Everton Larios is a 53 year old male with PMH etoh abuse, hypothyroidism, intermittent asthma, ulcerative colitis, Depression, Chronic HFrEF, NICM admitted with cardiogenic shock requiring subclavian balloon pump now s/p OHT 2/24/19 with Piyush House and Christopher.      TODAY'S PROGRESS:   - Heparin drip held for procedure   - R heart cath and biopsy today   - Start PM Seroquel (check EKG first)   - Check VBG and lactate   - Bedside NJ feeding tube placement  - Will likely stop antibiotics (plan to discuss with cardiology)   - Remove maddox, straight cath as needed   - Discontinue sliding scale insulin    PLAN:  Neuro/ pain/ sedation:  - Monitor neurological status. Notify the MD for any acute changes in exam.  - Tylenol prn for pain. Hold narcotics for altered mental status.    #ICU delirium  - Head CT 3/9 negative  - Check VBG and lactate  - Optimize sleep/wake cycle, melatonin for sleep, will schedule seroquel pm       #Depression  - PTA cymbalta 20mg     Pulmonary care:   - Doing well on room air  - Supp oxygen to maintain SpO2 >92%  - Encourage IS and deep breathing  - Chest tubes to suction.     #Intermittent asthma  - PTA Montelukast ordered    Cardiovascular: HR goal >95. Echo 3/5 with good LV function, mild decreased RV function (moderately dilated).  - Monitor hemodynamic status.   - MAP >65. HR goal > 95.  - Dobutamine 2.5 mcg/kg/min, sildenafil 20 q8h, terbutaline 10 mg q8h  - Continue statin. No aspirin per cards.   - Pacing wires capped.   - R heart cath and biopsy 3/11.    GI care:   #Severe malnutrition  - Regular diet  - Calorie counts low- poor oral intake. Will place bedside NJ today to start enteral nutrition.  - Senna, miralax and suppository for bowel regimen; MoM and miralax prn     #Intraperitoneal  free air (CT obtained 2/26)   - Small peritoneal defect made during redo operation, closed with stitches intra-op- likely source of free air. Resolved.    #Pierce's esophagus  #Ulcerative colitis  -PTA Mesalamine     Fluids/ Electrolytes/ Nutrition:   - TKO for IV fluid hydration  - NJ placement today to initiate enteral nutrition as above.  - No indication for parenteral nutrition.  - Replace electrolytes as needed.  #Hyponatremia  - Asymptomatic/improved on CRRT    Renal/ Fluid Balance:   #FRANKLIN, oliguric.    - Etiology likely cardiorenal from venous congestion vs nephrotoxicity from multiple medications  - CRRT started (3/9) to attempt fluid removal, possible iHD 3/11  - Will continue to monitor intake and output.  - PTA Tamsulosin      Endocrine:    # Stress hyperglycemia  - not requiring sliding scale insulin, discontinue  # hypothyroidism  - Home levothyroxine      ID/ Antibiotics: Perioperative antibiotics vanc/zosyn x 3 days- completed  #Leukocytosis, persistent since OR 2/24, afebrile   - BCx 3/9 x2 NGTD, CDiff 3/10/2019 negative  - BC and swan tip cultured 3/4 for leukocytosis- NGTD.   - Empirically started on Vanc/Zosyn 3/9 - will consider discontinuing today as he does not exhibit signs of infection, will discuss with cardiology.  - Valcyte for CMV prophylaxis; holding bactrim for PCP prophylaxis. Pentamidine given on 3/4.   - Nystatin for thrush     Heme:     #Bypass of persistent left SVC to R atrial appendage with [place] graft. Will require lifelong anti-coagulation for graft  - Hgb goal >7. Daily CBC.  - Hgb 6.8 3/9, transfused 1 u pRBC, stable 3/10/2019, no s/s bleeding   - Low intensity heparin gtt    Prophylaxis:    - Mechanical prophylaxis for DVT  - Heparin as above  - Protonix qd     Lines/ tubes/ drains:  - RIJ HD line 3/8, LUE PICC 3/9, L pleural chest tubes x2      Disposition:  - CV ICU.     Patient seen, findings and plan discussed with CVTS fellow.    Leyla Bowling MD  General Surgery  PGY-3  ====================================    SUBJECTIVE:   Intermittently confused/restless, only slept one hour overnight  Poor appetite, low calorie counts    OBJECTIVE:   1. VITAL SIGNS:   Temp:  [96.2  F (35.7  C)-97.3  F (36.3  C)] 96.3  F (35.7  C)  Heart Rate:  [89-98] 93  Resp:  [10-22] 13  BP: ()/(47-85) 101/60  SpO2:  [97 %-100 %] 98 %  Resp: 13      2. INTAKE/ OUTPUT:   I/O last 3 completed shifts:  In: 3097.9 [P.O.:1880; I.V.:1217.9]  Out: 3253 [Urine:43; Other:2645; Chest Tube:565]    3. PHYSICAL EXAMINATION:     General: appears somewhat restless in bed, deconditioned  Neuro: Oriented to person and place, moves all four extremities spontaneously   Resp: NLB on RA  CV: Regular rate, regular rhythm, serosanguinous output from L pleural chest tube   Abdomen: Soft, mildly distended, non-tender  Incisions: c/d/i, no erythema or drainage  Extremities: warm and well perfused     4. INVESTIGATIONS:   Arterial Blood Gases   No lab results found in last 7 days.  Complete Blood Count   Recent Labs   Lab 03/11/19  0956 03/11/19  0338 03/10/19  2201 03/10/19  1549   WBC 12.8* 13.6* 14.1* 13.6*   HGB 7.5* 7.7* 7.7* 8.2*   * 125* 130* 131*     Basic Metabolic Panel  Recent Labs   Lab 03/11/19  0956 03/11/19  0338 03/10/19  2201 03/10/19  1549    138 135 134   POTASSIUM 4.3 4.4 4.5 4.5   CHLORIDE 103 103 103 105   CO2 19* 18* 17* 17*   BUN 35* 39* 42* 46*   CR 1.77* 1.86* 1.91* 2.04*   GLC 81 91 74 82     Liver Function Tests  Recent Labs   Lab 03/11/19  0956 03/11/19  0338 03/10/19  2201 03/10/19  1549   AST 32 29 30 29   ALT 21 22 21 21   ALKPHOS 98 102 108 108   BILITOTAL 0.9 0.9 0.9 1.0   ALBUMIN 2.6* 2.8* 2.7* 2.6*   INR  --  1.50*  --   --      Pancreatic Enzymes  No lab results found in last 7 days.  Coagulation Profile  Recent Labs   Lab 03/11/19  0338   INR 1.50*         5. RADIOLOGY:   Recent Results (from the past 24 hour(s))   XR Chest Port 1 View    Narrative    Exam: XR CHEST PORT 1  VW, 3/11/2019 5:45 AM    Indication: confirm PA catheter position    Comparison: 3/9/2019    Findings:   Single portable view the chest. Right IJ central venous catheter  terminating in the low SVC. Left-sided PICC terminating in the left  SVC. Removal of the left IJ sheath. Sternotomy wires in place.  Bilateral chest tubes in similar position. Surgical staples projecting  over the left axilla. The cardiomediastinum and upper abdomen are  unchanged. No pleural effusion. No pneumothorax. Grossly unchanged  streaky bibasilar opacities.      Impression    Impression: Removal of the left IJ sheath, otherwise no significant  interval change.    I have personally reviewed the examination and initial interpretation  and I agree with the findings.    ZAC MIRZA MD       =========================================

## 2019-03-11 NOTE — PROGRESS NOTES
Nephrology Progress Note  March 10, 2019      Everton Larios MRN:[id number] YOB: 1965  Date of Admission:2/17/2019  Primary care provider: Fredrick Wolff  Requesting physician: Yves Rodrigues MD      Today Recommendations:  - Continue with CRRT, I=Os  - To re-evaluate tomorrow to switch to iHD depends on blood pressure and pressor requirement.     ASSESSMENT AND RECOMMENDATIONS:   Mr. Larios is a 53 year old male with history of chronic renal insufficiency, EtOH abuse, GIB w/splenic embolization, PAF s/p ablation and cardioversion, NICM c/b cardiogenic shock requiring IABP, s/p OHT (2/24/19) on tacrolimus and MMF, complicated by RV dysfunction per echo and acute oliguric kidney injury.         # Anuric FRANKLIN on CKD stage II-III:  - Patient with history of chronic renal insufficiency and cortical scarring of the L kidney per Renal US 03/2016. Baseline Crt 1.1- 1.4 over the last 6 months; however, prior BMPs have demonstrated patient Crt as high as 2.0. He most likely has a baseline level of CKD due to renal artery stenosis (longstanding smoking history, abdominal bruit on physical examination) and possible smaller FRANKLIN episodes from HFrEF exacerbations. Current rise in creatinine started 3/1 at 1.89, and increasing to 4.01 3/7 and oliguria is most likely due to nephrotoxicity associated with tacrolimus (a renal vasoconstrictor), and diuretic use. BUN continues to rise to 101, but no evidence of uremic symptoms on physical exam.   - The etiology of patient's creatinine rise is multifactorial in nature; however, there is mention in the transplant literature of the interaction between tacrolimus and valganciclovir leading to intra tubular obstruction.   - The use of PPIs alongside tacrolimus can lead to elevated tacro levels, with omeprazole being the worst offender of the class (Pharmacokinetics and Toxicity of Tacrolimus Early After Heart and Lung Transplantation PARVEZ winchester  Jose Keith. Am J Transplant. 2015 Sep;15(9):2301-13. doi: 10.1111/ajt.38825. Epub 2015 Jun 4.).  - Renal US notable for congestion of the renal veins bilaterally, suggesting right sided heart failure. No evidence of nephrolithiasis or hydronephrosis. Random urine sodium 9, suggestive of hypovolemic hyponatremia most likely in the setting of third spacing from RV dysfunction.    - Patient's mental status is back to normal.      Immunosuppressants:  - MMF (2/27 - date)  - Tacrolimus (2/28-date)  - Managed by transplant team.      #Electrolytes. Acid-base:  - Na: 134, WNL  - K: 4.5, WNL  - BiCarb: 17, low, p     #Hypotension-Volume status:  - Hypotension: on Vasopressin, CRRT I=Os,   - Total body volume is up, due to hypotension requiring pressor plan not to pull put fluid for today.       #anemia:  - Hgb: 8.2     Recommendations were communicated to primary team via Note.      Seen and discussed with Dr. Paul.     Bigg Bailey M.D.  Nephrology Fellow  Pager: [phone]    Interval History :   Nursing and provider notes from last 24 hours reviewed.  In the last 24 hours no major events overnight. Patient was seen and examined at bedside. Patient is improving, was on dobutamine this morning then switched to vasopressin. Patient denies any chest pain, nausea, vomiting or shortness of breath.       PAST MEDICAL HISTORY:    Past Medical History:   Diagnosis Date     Alcohol abuse      Pierce's esophagus 10/4/2018     Chronic rhinitis 10/4/2018     Chronic systolic heart failure (H) 10/4/2018     Depression 10/4/2018     Diastolic dysfunction      DJD (degenerative joint disease) - neck 10/4/2018     H/O congenital atrial septal defect (ASD) repair at age 3 10/4/2018     Hypothyroidism due to medication 10/4/2018     ICD, Conecta 2 2008; gen change 2/2018 10/4/2018     Intermittent asthma without complication  10/4/2018     Nonischemic cardiomyopathy (H) 10/4/2018     On amiodarone therapy 10/4/2018     Paroxysmal atrial fibrillation (H) 10/4/2018     S/P ablation of atrial fibrillation 10/4/2018     Systolic heart failure (H)      Ulcerative colitis (H) 10/4/2018     Ventricular tachycardia (H) 10/4/2018       Past Surgical History:   Procedure Laterality Date     AICD, DUAL CHAMBER       CV INTRA-AORTIC BALLOON N/A 2/18/2019    Procedure: Intra-Aortic Balloon;  Surgeon: Alton Solomon MD;  Location:  [facility]     INSERT INTRAAORTIC BALLOON PUMP Left 2/19/2019    Procedure: Insert left  Subclavian Balloon Pump,  Removal Right femoral arterial balloom pump sheath;  Surgeon: Dewayne House MD;  Location:  OR     INSERT INTRAAORTIC BALLOON PUMP Left 2/21/2019    Procedure: SUBCLAVIAN BALLOON PUMP PLACEMENT;  Surgeon: Ben White MD;  Location:  OR     TRANSPLANT HEART RECIPIENT N/A 2/24/2019    Procedure: TRANSPLANT HEART RECIPIENT;  Surgeon: Dewayne House MD;  Location:  OR        MEDICATIONS:  PTA Meds  Prior to Admission medications    Medication Sig Last Dose Taking? Auth Provider   albuterol (PROAIR HFA/PROVENTIL HFA/VENTOLIN HFA) 108 (90 Base) MCG/ACT inhaler Inhale 2 puffs into the lungs every 6 hours as needed for shortness of breath / dyspnea or wheezing Past Month at Unknown time Yes    Alpha-D-Galactosidase (BEANO PO) Take 2 tablets by mouth 3 times daily  Past Month at Unknown time Yes Reported, Patient   amiodarone (PACERONE/CODARONE) 200 MG tablet Take 1 tablet (200 mg) by mouth daily Past Month at Unknown time Yes    DULoxetine (CYMBALTA) 20 MG EC capsule Take 20 mg by mouth daily 3/1/2019 at Unknown time Yes Unknown, Entered By History   Ergocalciferol (VITAMIN D2 PO)  Past Month at Unknown time Yes Unknown, Entered By History   fluticasone (FLOVENT HFA) 220 MCG/ACT Inhaler Inhale 2 puffs into the lungs 2 times daily 3/1/2019 at Unknown time Yes Unknown,  Entered By History   furosemide (LASIX) 20 MG tablet Take 2 tablets (40 mg) by mouth 2 times daily 2/28/2019 at Unknown time Yes Corinna Donis MD   levothyroxine (SYNTHROID/LEVOTHROID) 100 MCG tablet Take 100 mcg by mouth daily  3/1/2019 at Unknown time Yes Unknown, Entered By History   mesalamine (ASACOL HD) 800 MG EC tablet Take 3 tablets (2,400 mg) by mouth 2 times daily 3/1/2019 at Unknown time Yes    milrinone (PRIMACOR) infusion 200 mcg/mL PREMIX Inject 6.6375 mcg/min into the vein continuous Past Month at Unknown time Yes Luis Carlos Templeton MD   montelukast (SINGULAIR) 10 MG tablet Take 1 tablet (10 mg) by mouth At Bedtime 3/1/2019 at Unknown time Yes    omeprazole (PRILOSEC) 40 MG capsule Take 1 capsule (40 mg) by mouth daily In pm Past Month at Unknown time Yes    ondansetron (ZOFRAN-ODT) 4 MG ODT tab Take 1 tablet (4 mg) by mouth every 8 hours as needed for nausea Past Week at Unknown time Yes    potassium chloride ER (K-DUR/KLOR-CON M) 20 MEQ CR tablet Take 40 mg in the am and 20 mg in the afternoon. 2/28/2019 at Unknown time Yes My Norton, NP   spironolactone (ALDACTONE) 25 MG tablet Take 25 mg by mouth daily Past Month at Unknown time Yes Unknown, Entered By History   tamsulosin (FLOMAX) 0.4 MG capsule Take 1 capsule (0.4 mg) by mouth daily (with dinner) Past Month at Unknown time Yes    warfarin (COUMADIN) 5 MG tablet Take 2.5 mg on Sun/Thurs and 5 mg on all other days of the week Past Month at Unknown time Yes    BETA BLOCKER NOT PRESCRIBED, INTENTIONAL, Beta Blocker not prescribed intentionally due to Recent tx with IV positive inotropic agent  Patient not taking: Reported on 1/30/2019   Luis Carlos Templeton MD      Current Meds    DULoxetine  20 mg Oral Daily     fluticasone  2 puff Inhalation Q12H     heparin lock flush  5 mL Intravenous Q24H     heparin lock flush  5-10 mL Intracatheter Q24H     insulin aspart  1-7 Units Subcutaneous TID AC     insulin aspart  1-5 Units Subcutaneous At  Bedtime     levothyroxine  100 mcg Oral Daily     lidocaine  1 patch Transdermal Q24H     lidocaine   Transdermal Q8H     lidocaine   Transdermal Q24h     melatonin  3 mg Oral At Bedtime     mesalamine  2,400 mg Oral BID     montelukast  10 mg Oral At Bedtime     mycophenolate  1,000 mg Oral BID IS     nystatin  500,000 Units Oral 4x Daily     piperacillin-tazobactam  4.5 g Intravenous Q6H     polyethylene glycol  17 g Oral Daily     predniSONE  15 mg Oral QPM     predniSONE  20 mg Oral Daily     ranitidine  150 mg Oral Daily     rosuvastatin  10 mg Oral Daily     senna-docusate  1 tablet Oral BID    Or     senna-docusate  2 tablet Oral BID     sildenafil  20 mg Oral TID     sodium chloride (PF)  3 mL Intracatheter Q8H     terbutaline  10 mg Oral Q8H     valGANciclovir  450 mg Oral Every Other Day     vancomycin (VANCOCIN) IV  1,250 mg Intravenous Q24H     Infusion Meds    CRRT replacement solution 12.5 mL/kg/hr (03/10/19 1523)     DOBUTamine 2.5 mcg/kg/min (03/10/19 2000)     HEParin 750 Units/hr (03/10/19 2000)     - MEDICATION INSTRUCTIONS -       - MEDICATION INSTRUCTIONS -       - MEDICATION INSTRUCTIONS -       CRRT replacement solution 3.656 mL/kg/hr (03/10/19 0405)     CRRT replacement solution 12.5 mL/kg/hr (03/10/19 1524)     BETA BLOCKER NOT PRESCRIBED       vasopressin (PITRESSIN) infusion ADULT (40 mL) Stopped (03/09/19 1802)       ALLERGIES:    Allergies   Allergen Reactions     Hydromorphone Other (See Comments)     Significant Delirium     Lisinopril Other (See Comments)     hypotension     Codeine Nausea       REVIEW OF SYSTEMS:  A comprehensive of systems was negative except as noted above.    SOCIAL HISTORY:   Social History     Socioeconomic History     Marital status: Single     Spouse name: Not on file     Number of children: Not on file     Years of education: Not on file     Highest education level: Not on file   Occupational History     Not on file   Social Needs     Financial resource  "strain: Not on file     Food insecurity:     Worry: Not on file     Inability: Not on file     Transportation needs:     Medical: Not on file     Non-medical: Not on file   Tobacco Use     Smoking status: Former Smoker     Years: 10.00     Smokeless tobacco: Never Used   Substance and Sexual Activity     Alcohol use: Yes     Alcohol/week: 0.6 oz     Types: 1 Cans of beer per week     Comment: a couple times a week      Drug use: No     Sexual activity: Not on file   Lifestyle     Physical activity:     Days per week: Not on file     Minutes per session: Not on file     Stress: Not on file   Relationships     Social connections:     Talks on phone: Not on file     Gets together: Not on file     Attends Spiritism service: Not on file     Active member of club or organization: Not on file     Attends meetings of clubs or organizations: Not on file     Relationship status: Not on file     Intimate partner violence:     Fear of current or ex partner: Not on file     Emotionally abused: Not on file     Physically abused: Not on file     Forced sexual activity: Not on file   Other Topics Concern     Not on file   Social History Narrative     Not on file       FAMILY MEDICAL HISTORY:   No family history on file.      PHYSICAL EXAM:   Temp  Av.9  F (36.6  C)  Min: 95.9  F (35.5  C)  Max: 100  F (37.8  C)  Arterial Line BP  Min: 68/56  Max: 182/36  Arterial Line BP 2  Min: 90/54  Max: 107/56  Arterial Line MAP (mmHg)  Av.7 mmHg  Min: 57 mmHg  Max: 123 mmHg Arterial Line Location 2: Right femoral    Pulse  Av.5  Min: 59  Max: 140 Resp  Av.1  Min: 8  Max: 45  FiO2 (%)  Av %  Min: 40 %  Max: 100 %  SpO2  Av.1 %  Min: 88 %  Max: 100 %    CVP (mmHg): 21 mmHg  /53   Pulse 93   Temp 96.9  F (36.1  C) (Axillary)   Resp 20   Ht 1.727 m (5' 8\")   Wt 62.6 kg (138 lb 0.1 oz)   SpO2 98%   BMI 20.98 kg/m     Date 19 07 - 19 0659   Shift [id number] 9510-8320 6049-4767 24 Hour Total "   INTAKE   P.O. 100   100   I.V. 70.4   70.4   Shift Total(mL/kg) 170.4(2.74)   170.4(2.74)   OUTPUT   Chest Tube(mL/kg) 238(3.83)   238(3.83)   Shift Total(mL/kg) 238(3.83)   238(3.83)   Weight (kg) 62.1 62.1 62.1 62.1      Admit Weight: 63.5 kg (140 lb 1.6 oz)     GENERAL APPEARANCE: Awake, alert  EYES: No scleral icterus, pupils equal but reacts sluggishly.  Lymphatics: no cervical or supraclavicular LAD  Pulmonary: lungs clear to auscultation with equal breath sounds bilaterally  CV: regular rhythm, normal rate, no rub   - JVD: Not elevated.    - Edema: 1+ B/L  GI: soft, nontender, normal bowel sounds. Bilateral abdominal bruit on auscultation  MS: no evidence of inflammation in joints, no muscle tenderness  SKIN: no rash, warm, dry, no cyanosis  NEURO: face symmetric,  no asterixis     LABS:     I personally reviewed the labs.     CMP  Recent Labs   Lab 03/10/19  1549 03/10/19  1003 03/10/19  0410 03/09/19  2122    135 133 133   POTASSIUM 4.5 4.5 4.6 4.6   CHLORIDE 105 103 102 101   CO2 17* 18* 18* 19*   ANIONGAP 12 13 13 13   GLC 82 81 88 101*   BUN 46* 50* 58* 67*   CR 2.04* 2.28* 2.61* 2.78*   GFRESTIMATED 35* 31* 26* 24*   GFRESTBLACK 41* 36* 30* 28*   RADAMES 8.6 8.0* 8.0* 8.3*   MAG 2.7* 2.6* 2.7* 2.6*   PHOS 5.6* 5.4* 5.8* 6.2*   PROTTOTAL 5.4* 5.2* 5.0* 5.2*   ALBUMIN 2.6* 2.6* 2.8* 2.7*   BILITOTAL 1.0 1.0 0.8 0.8   ALKPHOS 108 108 104 111   AST 29 26 22 24   ALT 21 20 19 19     CBC  Recent Labs   Lab 03/10/19  1549 03/10/19  1003 03/10/19  0410 03/09/19  2122   HGB 8.2* 7.7* 7.6* 8.1*   WBC 13.6* 11.5* 12.1* 13.7*   RBC 2.71* 2.55* 2.49* 2.67*   HCT 25.3* 23.7* 22.9* 24.7*   MCV 93 93 92 93   MCH 30.3 30.2 30.5 30.3   MCHC 32.4 32.5 33.2 32.8   RDW 15.9* 15.7* 15.2* 15.3*   * 131* 133* 141*     INRNo lab results found in last 7 days.  ABG  Recent Labs   Lab 03/10/19  1214 03/09/19  0400 03/08/19  2147 03/08/19  1623   O2PER 21 21 21.0 21.0      URINE STUDIES  Recent Labs   Lab Test  03/04/19  1303 02/23/19  1921 02/18/19  0957 11/15/18  1000   COLOR Yellow Yellow Yellow Yellow   APPEARANCE Clear Clear Clear Clear   URINEGLC Negative Negative Negative Negative   URINEBILI Negative Negative Negative Negative   URINEKETONE Negative Negative Negative Negative   SG 1.014 1.008 1.008 1.015   UBLD Negative Negative Negative Negative   URINEPH 5.0 6.0 5.5 5.0   PROTEIN Negative Negative Negative Negative   NITRITE Negative Negative Negative Negative   LEUKEST Negative Negative Negative Negative   RBCU 0 <1 <1 <1   WBCU 0 <1 1 1     No lab results found.  PTH  No lab results found.  IRON STUDIES  Recent Labs   Lab Test 11/15/18  0955 10/11/18  1235   IRON  --  63   FEB  --  457*   IRONSAT  --  14*   NATE 1,045*  --        IMAGING:  Renal Ultrasound w/dopplers 3/7/19:                                                                   Impression:     1. Technically difficult study. There is no diastolic flow in the  renal arteries bilaterally with resistive indices of 1.0, with to and  fro flow in the renal veins. Unable to Doppler arcuate arteries.  Findings are likely secondary to venous congestion, with differential  including right heart failure.     2. Moderate ascites.     3. Scarring in the left renal cortex.    Bigg Bailey M.D.  Nephrology Fellow  Pager: [phone]      I, Abhijeet Paul, saw this patient on 03/10/2019 with Dr. Yin Merida and agree with the findings and plan of care as documented in the note.

## 2019-03-11 NOTE — PROGRESS NOTES
Nephrology Progress Note  March 11, 2019      Everton Larios MRN:[id number] YOB: 1965  Date of Admission:2/17/2019  Primary care provider: Fredrick Wolff  Requesting physician: Yves Rodrigues MD      Today Recommendations:  - Reassess patient after returning from [facility] today to determine if CRRT will be continued.    ASSESSMENT AND RECOMMENDATIONS:   Mr. Larios is a 53 year old male with history of chronic renal insufficiency, EtOH abuse, GIB w/splenic embolization, PAF s/p ablation and cardioversion, NICM c/b cardiogenic shock requiring IABP, s/p OHT (2/24/19) on tacrolimus and MMF, complicated by RV dysfunction per echo and acute oliguric kidney injury.      # Anuric FRANKLIN on CKD stage II-III:  - Patient with history of chronic renal insufficiency and cortical scarring of the L kidney per Renal US 03/2016. Baseline Crt 1.1- 1.4 over the last 6 months; however, prior BMPs have demonstrated patient Crt as high as 2.0. He most likely has a baseline level of CKD due to renal artery stenosis (longstanding smoking history, abdominal bruit on physical examination) and possible smaller FRANKLIN episodes from HFrEF exacerbations. Current rise in creatinine started 3/1 at 1.89, and increasing to 4.01 3/7 and oliguria is most likely due to nephrotoxicity associated with tacrolimus (a renal vasoconstrictor), and diuretic use. BUN continues to rise to 101, but no evidence of uremic symptoms on physical exam.   - The etiology of patient's creatinine rise is multifactorial in nature; however, there is mention in the transplant literature of the interaction between tacrolimus and valganciclovir leading to intra tubular obstruction.   - The use of PPIs alongside tacrolimus can lead to elevated tacro levels, with omeprazole being the worst offender of the class (Pharmacokinetics and Toxicity of Tacrolimus Early After Heart and Lung Transplantation PARVEZ Ly  Kike Lewis. Am J Transplant. 2015 Sep;15(9):2301-13. doi: 10.1111/ajt.90833. Epub 2015 Jun 4.).  - Renal US notable for congestion of the renal veins bilaterally, suggesting right sided heart failure. No evidence of nephrolithiasis or hydronephrosis. Random urine sodium 9, suggestive of hypovolemic hyponatremia most likely in the setting of third spacing from RV dysfunction.   -Patient remains confused despite efforts to help improve    Immunosuppressants:  - MMF (2/27 - date)  - Tacrolimus (2/28-date)  - Managed by transplant team.      #Electrolytes. Acid-base:  - Na: 137, WNL  - K: 4.3, WNL  - BiCarb: 19     #Hypotension-Volume status:  - Hypotension: Worse when initiating CRRT, otherwise pressures are normotensive,   - Total volume remains increased based on JVD assessment, but will review RHC numbers today and decide if CRRT is still warranted. Possible intermittent HD tomorrow.     #Anemia:  - Hgb: 7.5     Recommendations were communicated to primary team via Note.      Seen and discussed with Dr. Paul.     Yasmany Witt MD  Internal Medicine, PGY-1  [phone]    Interval History :   Nursing and provider notes from last 24 hours reviewed.  Per prior RN notes, patient's mental status remains unchanged from where it was yesterday morning. He states that is in no pain, but is fixated on the fact that he feels confused. Mild SOB and LE edema noted while answering questions. Labs are improved now that patient is on CRRT; however, he remains anuric. No fevers, chills, CP, cough, abd pain, n/v/d/c.    PAST MEDICAL HISTORY:    Past Medical History:   Diagnosis Date     Alcohol abuse      Pierce's esophagus 10/4/2018     Chronic rhinitis 10/4/2018     Chronic systolic heart failure (H) 10/4/2018     Depression 10/4/2018     Diastolic dysfunction      DJD (degenerative joint disease) - neck 10/4/2018     H/O congenital atrial septal defect (ASD) repair at age 3  10/4/2018     Hypothyroidism due to medication 10/4/2018     ICD, [place] [employer] 2008; gen change 2/2018 10/4/2018     Intermittent asthma without complication 10/4/2018     Nonischemic cardiomyopathy (H) 10/4/2018     On amiodarone therapy 10/4/2018     Paroxysmal atrial fibrillation (H) 10/4/2018     S/P ablation of atrial fibrillation 10/4/2018     Systolic heart failure (H)      Ulcerative colitis (H) 10/4/2018     Ventricular tachycardia (H) 10/4/2018       Past Surgical History:   Procedure Laterality Date     AICD, DUAL CHAMBER       CV INTRA-AORTIC BALLOON N/A 2/18/2019    Procedure: Intra-Aortic Balloon;  Surgeon: Alton Solomon MD;  Location:  [facility]     INSERT INTRAAORTIC BALLOON PUMP Left 2/19/2019    Procedure: Insert left  Subclavian Balloon Pump,  Removal Right femoral arterial balloom pump sheath;  Surgeon: Dewayne House MD;  Location:  OR     INSERT INTRAAORTIC BALLOON PUMP Left 2/21/2019    Procedure: SUBCLAVIAN BALLOON PUMP PLACEMENT;  Surgeon: Ben White MD;  Location: [facility] OR     TRANSPLANT HEART RECIPIENT N/A 2/24/2019    Procedure: TRANSPLANT HEART RECIPIENT;  Surgeon: Dewayne House MD;  Location:  OR        MEDICATIONS:  PTA Meds  Prior to Admission medications    Medication Sig Last Dose Taking? Auth Provider   albuterol (PROAIR HFA/PROVENTIL HFA/VENTOLIN HFA) 108 (90 Base) MCG/ACT inhaler Inhale 2 puffs into the lungs every 6 hours as needed for shortness of breath / dyspnea or wheezing Past Month at Unknown time Yes    Alpha-D-Galactosidase (BEANO PO) Take 2 tablets by mouth 3 times daily  Past Month at Unknown time Yes Reported, Patient   amiodarone (PACERONE/CODARONE) 200 MG tablet Take 1 tablet (200 mg) by mouth daily Past Month at Unknown time Yes    DULoxetine (CYMBALTA) 20 MG EC capsule Take 20 mg by mouth daily 3/1/2019 at Unknown time Yes Unknown, Entered By History   Ergocalciferol (VITAMIN D2 PO)  Past Month at Unknown time  Yes Unknown, Entered By History   fluticasone (FLOVENT HFA) 220 MCG/ACT Inhaler Inhale 2 puffs into the lungs 2 times daily 3/1/2019 at Unknown time Yes Unknown, Entered By History   furosemide (LASIX) 20 MG tablet Take 2 tablets (40 mg) by mouth 2 times daily 2/28/2019 at Unknown time Yes Corinna Donis MD   levothyroxine (SYNTHROID/LEVOTHROID) 100 MCG tablet Take 100 mcg by mouth daily  3/1/2019 at Unknown time Yes Unknown, Entered By History   mesalamine (ASACOL HD) 800 MG EC tablet Take 3 tablets (2,400 mg) by mouth 2 times daily 3/1/2019 at Unknown time Yes    milrinone (PRIMACOR) infusion 200 mcg/mL PREMIX Inject 6.6375 mcg/min into the vein continuous Past Month at Unknown time Yes Luis Carlos Templeton MD   montelukast (SINGULAIR) 10 MG tablet Take 1 tablet (10 mg) by mouth At Bedtime 3/1/2019 at Unknown time Yes    omeprazole (PRILOSEC) 40 MG capsule Take 1 capsule (40 mg) by mouth daily In pm Past Month at Unknown time Yes    ondansetron (ZOFRAN-ODT) 4 MG ODT tab Take 1 tablet (4 mg) by mouth every 8 hours as needed for nausea Past Week at Unknown time Yes    potassium chloride ER (K-DUR/KLOR-CON M) 20 MEQ CR tablet Take 40 mg in the am and 20 mg in the afternoon. 2/28/2019 at Unknown time Yes My Norton, NP   spironolactone (ALDACTONE) 25 MG tablet Take 25 mg by mouth daily Past Month at Unknown time Yes Unknown, Entered By History   tamsulosin (FLOMAX) 0.4 MG capsule Take 1 capsule (0.4 mg) by mouth daily (with dinner) Past Month at Unknown time Yes    warfarin (COUMADIN) 5 MG tablet Take 2.5 mg on Sun/Thurs and 5 mg on all other days of the week Past Month at Unknown time Yes    BETA BLOCKER NOT PRESCRIBED, INTENTIONAL, Beta Blocker not prescribed intentionally due to Recent tx with IV positive inotropic agent  Patient not taking: Reported on 1/30/2019   Luis Carlos Templeton MD      Current Meds    DULoxetine  20 mg Oral Daily     fluticasone  2 puff Inhalation Q12H     heparin lock flush  5 mL  Intravenous Q24H     heparin lock flush  5-10 mL Intracatheter Q24H     levothyroxine  100 mcg Oral Daily     melatonin  3 mg Oral At Bedtime     mesalamine  2,400 mg Oral BID     montelukast  10 mg Oral At Bedtime     mycophenolate  1,000 mg Oral BID IS     nystatin  500,000 Units Oral 4x Daily     piperacillin-tazobactam  4.5 g Intravenous Q6H     polyethylene glycol  17 g Oral Daily     predniSONE  15 mg Oral QPM     predniSONE  20 mg Oral Daily     QUEtiapine  25 mg Oral or Feeding Tube At Bedtime     ranitidine  150 mg Oral Daily     rosuvastatin  10 mg Oral Daily     senna-docusate  1 tablet Oral BID    Or     senna-docusate  2 tablet Oral BID     sildenafil  20 mg Oral TID     sodium chloride (PF)  3 mL Intracatheter Q8H     terbutaline  10 mg Oral Q8H     valGANciclovir  450 mg Oral Every Other Day     vancomycin (VANCOCIN) IV  1,250 mg Intravenous Q24H     Infusion Meds    CRRT replacement solution 12.5 mL/kg/hr (03/11/19 1259)     DOBUTamine 2.5 mcg/kg/min (03/11/19 1200)     HEParin Stopped (03/11/19 0852)     - MEDICATION INSTRUCTIONS -       - MEDICATION INSTRUCTIONS -       - MEDICATION INSTRUCTIONS -       CRRT replacement solution 3.656 mL/kg/hr (03/11/19 0532)     CRRT replacement solution 12.5 mL/kg/hr (03/11/19 1259)     BETA BLOCKER NOT PRESCRIBED         ALLERGIES:    Allergies   Allergen Reactions     Hydromorphone Other (See Comments)     Significant Delirium     Lisinopril Other (See Comments)     hypotension     Codeine Nausea       REVIEW OF SYSTEMS:  A comprehensive of systems was negative except as noted above.    SOCIAL HISTORY:   Social History     Socioeconomic History     Marital status: Single     Spouse name: Not on file     Number of children: Not on file     Years of education: Not on file     Highest education level: Not on file   Occupational History     Not on file   Social Needs     Financial resource strain: Not on file     Food insecurity:     Worry: Not on file      "Inability: Not on file     Transportation needs:     Medical: Not on file     Non-medical: Not on file   Tobacco Use     Smoking status: Former Smoker     Years: 10.00     Smokeless tobacco: Never Used   Substance and Sexual Activity     Alcohol use: Yes     Alcohol/week: 0.6 oz     Types: 1 Cans of beer per week     Comment: a couple times a week      Drug use: No     Sexual activity: Not on file   Lifestyle     Physical activity:     Days per week: Not on file     Minutes per session: Not on file     Stress: Not on file   Relationships     Social connections:     Talks on phone: Not on file     Gets together: Not on file     Attends Orthodox service: Not on file     Active member of club or organization: Not on file     Attends meetings of clubs or organizations: Not on file     Relationship status: Not on file     Intimate partner violence:     Fear of current or ex partner: Not on file     Emotionally abused: Not on file     Physically abused: Not on file     Forced sexual activity: Not on file   Other Topics Concern     Not on file   Social History Narrative     Not on file       FAMILY MEDICAL HISTORY:   No family history on file.      PHYSICAL EXAM:   Temp  Av.9  F (36.6  C)  Min: 95.9  F (35.5  C)  Max: 100  F (37.8  C)  Arterial Line BP  Min: 68/56  Max: 182/36  Arterial Line BP 2  Min: 90/54  Max: 107/56  Arterial Line MAP (mmHg)  Av.7 mmHg  Min: 57 mmHg  Max: 123 mmHg Arterial Line Location 2: Right femoral    Pulse  Av.5  Min: 59  Max: 140 Resp  Av.1  Min: 8  Max: 45  FiO2 (%)  Av %  Min: 40 %  Max: 100 %  SpO2  Av.1 %  Min: 88 %  Max: 100 %    CVP (mmHg): 21 mmHg  /57   Pulse 93   Temp 96.9  F (36.1  C) (Axillary)   Resp 16   Ht 1.727 m (5' 8\")   Wt 62 kg (136 lb 11 oz)   SpO2 99%   BMI 20.78 kg/m     Date 19 07 - 1961   Shift 7373-0752 6856-4749 8279-4944 24 Hour Total   INTAKE   P.O. 100   100   I.V. 70.4   70.4   Shift Total(mL/kg) " 170.4(2.74)   170.4(2.74)   OUTPUT   Chest Tube(mL/kg) 238(3.83)   238(3.83)   Shift Total(mL/kg) 238(3.83)   238(3.83)   Weight (kg) 62.1 62.1 62.1 62.1      Admit Weight: 63.5 kg (140 lb 1.6 oz)     GENERAL APPEARANCE: Requires constant arousal, able to answer questions. Oriented to person, year, president.   EYES: No scleral icterus, pupils equal but react sluggishly.  Lymphatics: no cervical or supraclavicular LAD  Pulmonary: lungs clear to auscultation with equal breath sounds bilaterally  CV: regular rhythm, normal rate, no rub   - JVD: +18 cm.    - Edema: 1+ B/L  GI: soft, nontender, normal bowel sounds. Bilateral abdominal bruit on auscultation  MS: no evidence of inflammation in joints, no muscle tenderness  SKIN: no rash, warm, dry, no cyanosis  NEURO: face symmetric, unable to follow commands due to mental status    LABS:     I personally reviewed the labs.     CMP  Recent Labs   Lab 03/11/19  0956 03/11/19  0338 03/10/19  2201 03/10/19  1549    138 135 134   POTASSIUM 4.3 4.4 4.5 4.5   CHLORIDE 103 103 103 105   CO2 19* 18* 17* 17*   ANIONGAP 16* 17* 15* 12   GLC 81 91 74 82   BUN 35* 39* 42* 46*   CR 1.77* 1.86* 1.91* 2.04*   GFRESTIMATED 42* 40* 38* 35*   GFRESTBLACK 49* 46* 44* 41*   RADAMES 7.7* 8.0* 8.1* 8.6   MAG 2.6* 2.7* 2.6* 2.7*   PHOS 5.3* 5.3* 5.5* 5.6*   PROTTOTAL 5.0* 5.1* 5.2* 5.4*   ALBUMIN 2.6* 2.8* 2.7* 2.6*   BILITOTAL 0.9 0.9 0.9 1.0   ALKPHOS 98 102 108 108   AST 32 29 30 29   ALT 21 22 21 21     CBC  Recent Labs   Lab 03/11/19  0956 03/11/19  0338 03/10/19  2201 03/10/19  1549   HGB 7.5* 7.7* 7.7* 8.2*   WBC 12.8* 13.6* 14.1* 13.6*   RBC 2.43* 2.58* 2.56* 2.71*   HCT 22.6* 24.0* 23.7* 25.3*   MCV 93 93 93 93   MCH 30.9 29.8 30.1 30.3   MCHC 33.2 32.1 32.5 32.4   RDW 15.9* 16.0* 15.9* 15.9*   * 125* 130* 131*     INR  Recent Labs   Lab 03/11/19  0338   INR 1.50*     ABG  Recent Labs   Lab 03/11/19  1152 03/10/19  1214 03/09/19  0400 03/08/19  2147   O2PER 21 21 21 21.0       URINE STUDIES  Recent Labs   Lab Test 03/04/19  1303 02/23/19  1921 02/18/19  0957 11/15/18  1000   COLOR Yellow Yellow Yellow Yellow   APPEARANCE Clear Clear Clear Clear   URINEGLC Negative Negative Negative Negative   URINEBILI Negative Negative Negative Negative   URINEKETONE Negative Negative Negative Negative   SG 1.014 1.008 1.008 1.015   UBLD Negative Negative Negative Negative   URINEPH 5.0 6.0 5.5 5.0   PROTEIN Negative Negative Negative Negative   NITRITE Negative Negative Negative Negative   LEUKEST Negative Negative Negative Negative   RBCU 0 <1 <1 <1   WBCU 0 <1 1 1     No lab results found.  PTH  No lab results found.  IRON STUDIES  Recent Labs   Lab Test 11/15/18  0955 10/11/18  1235   IRON  --  63   FEB  --  457*   IRONSAT  --  14*   NATE 1,045*  --        IMAGING:  Renal Ultrasound w/dopplers 3/7/19:                                                                   Impression:     1. Technically difficult study. There is no diastolic flow in the  renal arteries bilaterally with resistive indices of 1.0, with to and  fro flow in the renal veins. Unable to Doppler arcuate arteries.  Findings are likely secondary to venous congestion, with differential  including right heart failure.     2. Moderate ascites.     3. Scarring in the left renal cortex.      I, Abhijeet Paul, saw this patient on 03/11/2019 with Dr. Witt and agree with the findings and plan of care as documented in the note.

## 2019-03-11 NOTE — PROGRESS NOTES
ADVANCED HEART FAILURE PROGRESS NOTE    SUBJECTIVE:  No acute events. Remains intermittently disoriented    ROS otherwise negative.    OBJECTIVE:  Vital signs:  Temp: 96.3  F (35.7  C) Temp  Min: 96.2  F (35.7  C)  Max: 97.6  F (36.4  C)  Heart Rate: 94 Heart Rate  Min: 89  Max: 98  BP: 97/57 Systolic (24hrs), Av , Min:85 , Max:116   Diastolic (24hrs), Av, Min:47, Max:89    Resp: 19 Resp  Min: 9  Max: 25  SpO2: 99 % SpO2  Min: 97 %  Max: 100 %      Intake/Output Summary (Last 24 hours) at 3/11/2019 0656  Last data filed at 3/11/2019 0600  Gross per 24 hour   Intake 3097.9 ml   Output 3253 ml   Net -155.1 ml       Vitals:    19 0200 03/10/19 0000 19 0000   Weight: 64.1 kg (141 lb 5 oz) 62.6 kg (138 lb 0.1 oz) 62 kg (136 lb 11 oz)       Physical Exam:  GEN:  no distress, somewhat confused, sleepy but easily arousable  CV:  Regular rate and rhythm, no murmur.   CHEST: Subclavian incision sutured.  Chest tubes.  LUNGS:  On room air, normal work of breathing, Clear to auscultation bilaterally, no wheezes or crackles.   ABD:  Active bowel sounds, soft, non-tender/non-distended.  No rebound/guarding/rigidity.  EXT:  No edema or cyanosis. Normal distal puses  NEURO: no focal deficits      Medications:    CRRT replacement solution 12.5 mL/kg/hr (19 0537)     DOBUTamine 2.5 mcg/kg/min (19)     HEParin 750 Units/hr (19)     - MEDICATION INSTRUCTIONS -       - MEDICATION INSTRUCTIONS -       - MEDICATION INSTRUCTIONS -       CRRT replacement solution 3.656 mL/kg/hr (19 0532)     CRRT replacement solution 12.5 mL/kg/hr (19 0536)     BETA BLOCKER NOT PRESCRIBED       vasopressin (PITRESSIN) infusion ADULT (40 mL) Stopped (19)       Current Facility-Administered Medications   Medication Dose Route Frequency     DULoxetine  20 mg Oral Daily     fluticasone  2 puff Inhalation Q12H     heparin lock flush  5 mL Intravenous Q24H     heparin lock flush  5-10 mL  Intracatheter Q24H     insulin aspart  1-7 Units Subcutaneous TID AC     insulin aspart  1-5 Units Subcutaneous At Bedtime     levothyroxine  100 mcg Oral Daily     lidocaine  1 patch Transdermal Q24H     lidocaine   Transdermal Q8H     lidocaine   Transdermal Q24h     melatonin  3 mg Oral At Bedtime     mesalamine  2,400 mg Oral BID     montelukast  10 mg Oral At Bedtime     mycophenolate  1,000 mg Oral BID IS     nystatin  500,000 Units Oral 4x Daily     piperacillin-tazobactam  4.5 g Intravenous Q6H     polyethylene glycol  17 g Oral Daily     predniSONE  15 mg Oral QPM     predniSONE  20 mg Oral Daily     ranitidine  150 mg Oral Daily     rosuvastatin  10 mg Oral Daily     senna-docusate  1 tablet Oral BID    Or     senna-docusate  2 tablet Oral BID     sildenafil  20 mg Oral TID     sodium chloride (PF)  3 mL Intracatheter Q8H     terbutaline  10 mg Oral Q8H     valGANciclovir  450 mg Oral Every Other Day     vancomycin (VANCOCIN) IV  1,250 mg Intravenous Q24H         Labs:  CMP  Recent Labs   Lab 03/11/19  0338 03/10/19  2201  03/10/19  0410  03/09/19  1148    135   < > 133   < >  --    POTASSIUM 4.4 4.5   < > 4.6   < >  --    CHLORIDE 103 103   < > 102   < >  --    CO2 18* 17*   < > 18*   < >  --    BUN 39* 42*   < > 58*   < >  --    CR 1.86* 1.91*   < > 2.61*   < >  --    RADAMES 8.0* 8.1*   < > 8.0*   < >  --    MAG 2.7* 2.6*   < > 2.7*   < >  --    PHOS 5.3* 5.5*   < > 5.8*   < >  --    GLC 91 74   < > 88   < >  --    LDH  --   --   --  276*  --  266*   ALKPHOS 102 108   < > 104   < >  --    BILITOTAL 0.9 0.9   < > 0.8   < >  --    AST 29 30   < > 22   < >  --    ALT 22 21   < > 19   < >  --     < > = values in this interval not displayed.     BLOOD GASNo lab results found in last 7 days.  CBC  Recent Labs   Lab 03/11/19  0338 03/10/19  2201   WBC 13.6* 14.1*   HGB 7.7* 7.7*   HCT 24.0* 23.7*   * 130*     COAG  Recent Labs   Lab 03/11/19  0338   INR 1.50*         ASSESSMENT/PLAN:  Everton Larios is a  52 yo gentleman who underwent orthotopic heart transplant 2/24/19 for non ischemic cardiomyopathy and cardiogenic shock. Post-op complicated by RV dysfunction and FRANKLIN.     Updates today:  - continue CRRT, may transition to intermittent HD tomorrow  - RHC and biopsy # 2 today     #Oliguric renal failure:  Etiology cardiorenal from venous congestion vs. nephrotoxins.  - now on CRRT, pulling fluid as tolerated  - nephrology consulted, appreciate assistance  - may transition to intermittent HD tomorrow     #Hyponatremia:  Likely hypervolemic hyponatremia from mild RV failure.  -Daily BMP  -Urine Na     #Sepsis  - start empiric vancomycin and zosyn (3/9 - ), stopping vanc today  - cultures no growth to date  - US of right chest wall swelling shows stable hematoma     #S/p OHT on 2/24:  Graft function:  - echo 3/1 shows normal LV function, RV is mild to moderately dilated and hypokinetic  - dobutamine at 2.5 for continued RV and hemodynamic support  - continue sildenafil 20 q8h  - off isuprel, continue terbutaline 10 q8h     Immunosuppression:  - on MMF decreased to 1000 BID given possible sepsis  - s/p methylpred 125 x 3 doses, continue prednisone 20mg BID, taper by 5 daily with negative biopsies  - NOT Simulect protocol, resume tacro 0.5 bid, monitoring level     Prophylaxis:  - CMV +/+: medium risk, start renal dose Valcyte  - PCP: holding Bactrim for now, pentamadine given 3/3  - Thrush: start nystatin  - GI: on PPI  - CAV: start Crestor, holding ASA given bleeding at line sites     Biopsies:  - First biopsy 3/4, 0R, no AMR  - Second biopsy 3/11, pending  - Third biopsy due 3/18     Goretex conduit for persistent left SVC:  - will need anticoagulation, on low intensity heparin  - NO CENTRAL VENOUS CATHETERS ON THE LEFT UPPER BODY        Seen and staffed with Dr. Galan.      Trev Harris MD  Advanced Heart Failure Fellow  [phone]

## 2019-03-12 ENCOUNTER — APPOINTMENT (OUTPATIENT)
Dept: GENERAL RADIOLOGY | Facility: CLINIC | Age: 54
DRG: 001 | End: 2019-03-12
Attending: INTERNAL MEDICINE
Payer: COMMERCIAL

## 2019-03-12 ENCOUNTER — APPOINTMENT (OUTPATIENT)
Dept: OCCUPATIONAL THERAPY | Facility: CLINIC | Age: 54
DRG: 001 | End: 2019-03-12
Attending: INTERNAL MEDICINE
Payer: COMMERCIAL

## 2019-03-12 ENCOUNTER — APPOINTMENT (OUTPATIENT)
Dept: PHYSICAL THERAPY | Facility: CLINIC | Age: 54
DRG: 001 | End: 2019-03-12
Attending: INTERNAL MEDICINE
Payer: COMMERCIAL

## 2019-03-12 LAB
ALBUMIN SERPL-MCNC: 2.4 G/DL (ref 3.4–5)
ALBUMIN SERPL-MCNC: 2.5 G/DL (ref 3.4–5)
ALBUMIN SERPL-MCNC: 2.5 G/DL (ref 3.4–5)
ALBUMIN SERPL-MCNC: 2.6 G/DL (ref 3.4–5)
ALP SERPL-CCNC: 102 U/L (ref 40–150)
ALP SERPL-CCNC: 104 U/L (ref 40–150)
ALP SERPL-CCNC: 107 U/L (ref 40–150)
ALP SERPL-CCNC: 98 U/L (ref 40–150)
ALT SERPL W P-5'-P-CCNC: 22 U/L (ref 0–70)
ALT SERPL W P-5'-P-CCNC: 23 U/L (ref 0–70)
ALT SERPL W P-5'-P-CCNC: 24 U/L (ref 0–70)
ALT SERPL W P-5'-P-CCNC: 25 U/L (ref 0–70)
ANION GAP SERPL CALCULATED.3IONS-SCNC: 10 MMOL/L (ref 3–14)
ANION GAP SERPL CALCULATED.3IONS-SCNC: 12 MMOL/L (ref 3–14)
ANION GAP SERPL CALCULATED.3IONS-SCNC: 14 MMOL/L (ref 3–14)
ANION GAP SERPL CALCULATED.3IONS-SCNC: 9 MMOL/L (ref 3–14)
ANISOCYTOSIS BLD QL SMEAR: SLIGHT
ANISOCYTOSIS BLD QL SMEAR: SLIGHT
AST SERPL W P-5'-P-CCNC: 30 U/L (ref 0–45)
AST SERPL W P-5'-P-CCNC: 35 U/L (ref 0–45)
AST SERPL W P-5'-P-CCNC: 36 U/L (ref 0–45)
AST SERPL W P-5'-P-CCNC: 38 U/L (ref 0–45)
BASOPHILS # BLD AUTO: 0 10E9/L (ref 0–0.2)
BASOPHILS NFR BLD AUTO: 0 %
BASOPHILS NFR BLD AUTO: 0 %
BASOPHILS NFR BLD AUTO: 0.1 %
BILIRUB SERPL-MCNC: 0.8 MG/DL (ref 0.2–1.3)
BILIRUB SERPL-MCNC: 0.9 MG/DL (ref 0.2–1.3)
BUN SERPL-MCNC: 25 MG/DL (ref 7–30)
BUN SERPL-MCNC: 26 MG/DL (ref 7–30)
BUN SERPL-MCNC: 29 MG/DL (ref 7–30)
BUN SERPL-MCNC: 30 MG/DL (ref 7–30)
CA-I SERPL ISE-MCNC: 4.3 MG/DL (ref 4.4–5.2)
CA-I SERPL ISE-MCNC: 4.4 MG/DL (ref 4.4–5.2)
CA-I SERPL ISE-MCNC: 4.5 MG/DL (ref 4.4–5.2)
CA-I SERPL ISE-MCNC: 4.7 MG/DL (ref 4.4–5.2)
CALCIUM SERPL-MCNC: 7.6 MG/DL (ref 8.5–10.1)
CALCIUM SERPL-MCNC: 7.8 MG/DL (ref 8.5–10.1)
CALCIUM SERPL-MCNC: 8 MG/DL (ref 8.5–10.1)
CALCIUM SERPL-MCNC: 8.4 MG/DL (ref 8.5–10.1)
CHLORIDE SERPL-SCNC: 105 MMOL/L (ref 94–109)
CHLORIDE SERPL-SCNC: 105 MMOL/L (ref 94–109)
CHLORIDE SERPL-SCNC: 106 MMOL/L (ref 94–109)
CHLORIDE SERPL-SCNC: 106 MMOL/L (ref 94–109)
CO2 SERPL-SCNC: 20 MMOL/L (ref 20–32)
CO2 SERPL-SCNC: 20 MMOL/L (ref 20–32)
CO2 SERPL-SCNC: 22 MMOL/L (ref 20–32)
CO2 SERPL-SCNC: 22 MMOL/L (ref 20–32)
COPATH REPORT: NORMAL
CREAT SERPL-MCNC: 1.4 MG/DL (ref 0.66–1.25)
CREAT SERPL-MCNC: 1.46 MG/DL (ref 0.66–1.25)
CREAT SERPL-MCNC: 1.57 MG/DL (ref 0.66–1.25)
CREAT SERPL-MCNC: 1.67 MG/DL (ref 0.66–1.25)
DIFFERENTIAL METHOD BLD: ABNORMAL
EOSINOPHIL # BLD AUTO: 0 10E9/L (ref 0–0.7)
EOSINOPHIL NFR BLD AUTO: 0 %
ERYTHROCYTE [DISTWIDTH] IN BLOOD BY AUTOMATED COUNT: 16.2 % (ref 10–15)
ERYTHROCYTE [DISTWIDTH] IN BLOOD BY AUTOMATED COUNT: 16.6 % (ref 10–15)
ERYTHROCYTE [DISTWIDTH] IN BLOOD BY AUTOMATED COUNT: 16.7 % (ref 10–15)
GFR SERPL CREATININE-BSD FRML MDRD: 45 ML/MIN/{1.73_M2}
GFR SERPL CREATININE-BSD FRML MDRD: 49 ML/MIN/{1.73_M2}
GFR SERPL CREATININE-BSD FRML MDRD: 53 ML/MIN/{1.73_M2}
GFR SERPL CREATININE-BSD FRML MDRD: 56 ML/MIN/{1.73_M2}
GLUCOSE BLDC GLUCOMTR-MCNC: 180 MG/DL (ref 70–99)
GLUCOSE SERPL-MCNC: 116 MG/DL (ref 70–99)
GLUCOSE SERPL-MCNC: 124 MG/DL (ref 70–99)
GLUCOSE SERPL-MCNC: 127 MG/DL (ref 70–99)
GLUCOSE SERPL-MCNC: 180 MG/DL (ref 70–99)
HCT VFR BLD AUTO: 22.6 % (ref 40–53)
HCT VFR BLD AUTO: 22.7 % (ref 40–53)
HCT VFR BLD AUTO: 22.8 % (ref 40–53)
HCT VFR BLD AUTO: 23.3 % (ref 40–53)
HCT VFR BLD AUTO: 23.4 % (ref 40–53)
HGB BLD-MCNC: 7.1 G/DL (ref 13.3–17.7)
HGB BLD-MCNC: 7.2 G/DL (ref 13.3–17.7)
HGB BLD-MCNC: 7.2 G/DL (ref 13.3–17.7)
HGB BLD-MCNC: 7.3 G/DL (ref 13.3–17.7)
HGB BLD-MCNC: 7.5 G/DL (ref 13.3–17.7)
IMM GRANULOCYTES # BLD: 0.2 10E9/L (ref 0–0.4)
IMM GRANULOCYTES NFR BLD: 1.3 %
IMM GRANULOCYTES NFR BLD: 1.5 %
IMM GRANULOCYTES NFR BLD: 1.6 %
INTERPRETATION ECG - MUSE: NORMAL
LMWH PPP CHRO-ACNC: 0.12 IU/ML
LMWH PPP CHRO-ACNC: 0.32 IU/ML
LYMPHOCYTES # BLD AUTO: 0 10E9/L (ref 0.8–5.3)
LYMPHOCYTES # BLD AUTO: 0.2 10E9/L (ref 0.8–5.3)
LYMPHOCYTES # BLD AUTO: 0.3 10E9/L (ref 0.8–5.3)
LYMPHOCYTES # BLD AUTO: 0.4 10E9/L (ref 0.8–5.3)
LYMPHOCYTES # BLD AUTO: 0.4 10E9/L (ref 0.8–5.3)
LYMPHOCYTES NFR BLD AUTO: 0 %
LYMPHOCYTES NFR BLD AUTO: 1.8 %
LYMPHOCYTES NFR BLD AUTO: 2.5 %
LYMPHOCYTES NFR BLD AUTO: 2.6 %
LYMPHOCYTES NFR BLD AUTO: 2.6 %
MAGNESIUM SERPL-MCNC: 2.4 MG/DL (ref 1.6–2.3)
MAGNESIUM SERPL-MCNC: 2.4 MG/DL (ref 1.6–2.3)
MAGNESIUM SERPL-MCNC: 2.5 MG/DL (ref 1.6–2.3)
MAGNESIUM SERPL-MCNC: 2.6 MG/DL (ref 1.6–2.3)
MCH RBC QN AUTO: 29.9 PG (ref 26.5–33)
MCH RBC QN AUTO: 30.1 PG (ref 26.5–33)
MCH RBC QN AUTO: 30.2 PG (ref 26.5–33)
MCH RBC QN AUTO: 30.2 PG (ref 26.5–33)
MCH RBC QN AUTO: 30.5 PG (ref 26.5–33)
MCHC RBC AUTO-ENTMCNC: 31.3 G/DL (ref 31.5–36.5)
MCHC RBC AUTO-ENTMCNC: 31.4 G/DL (ref 31.5–36.5)
MCHC RBC AUTO-ENTMCNC: 31.6 G/DL (ref 31.5–36.5)
MCHC RBC AUTO-ENTMCNC: 31.7 G/DL (ref 31.5–36.5)
MCHC RBC AUTO-ENTMCNC: 32.1 G/DL (ref 31.5–36.5)
MCV RBC AUTO: 94 FL (ref 78–100)
MCV RBC AUTO: 95 FL (ref 78–100)
MCV RBC AUTO: 96 FL (ref 78–100)
MONOCYTES # BLD AUTO: 0 10E9/L (ref 0–1.3)
MONOCYTES # BLD AUTO: 0 10E9/L (ref 0–1.3)
MONOCYTES # BLD AUTO: 0.4 10E9/L (ref 0–1.3)
MONOCYTES # BLD AUTO: 0.4 10E9/L (ref 0–1.3)
MONOCYTES # BLD AUTO: 0.5 10E9/L (ref 0–1.3)
MONOCYTES NFR BLD AUTO: 0 %
MONOCYTES NFR BLD AUTO: 0 %
MONOCYTES NFR BLD AUTO: 2.6 %
MONOCYTES NFR BLD AUTO: 3 %
MONOCYTES NFR BLD AUTO: 4 %
NEUTROPHILS # BLD AUTO: 11.5 10E9/L (ref 1.6–8.3)
NEUTROPHILS # BLD AUTO: 11.8 10E9/L (ref 1.6–8.3)
NEUTROPHILS # BLD AUTO: 12.3 10E9/L (ref 1.6–8.3)
NEUTROPHILS # BLD AUTO: 13.6 10E9/L (ref 1.6–8.3)
NEUTROPHILS # BLD AUTO: 14.8 10E9/L (ref 1.6–8.3)
NEUTROPHILS NFR BLD AUTO: 100 %
NEUTROPHILS NFR BLD AUTO: 91.8 %
NEUTROPHILS NFR BLD AUTO: 92.8 %
NEUTROPHILS NFR BLD AUTO: 93.4 %
NEUTROPHILS NFR BLD AUTO: 98.2 %
NRBC # BLD AUTO: 0.4 10*3/UL
NRBC # BLD AUTO: 0.5 10*3/UL
NRBC # BLD AUTO: 0.7 10*3/UL
NRBC BLD AUTO-RTO: 3 /100
NRBC BLD AUTO-RTO: 4 /100
NRBC BLD AUTO-RTO: 6 /100
PHOSPHATE SERPL-MCNC: 3.1 MG/DL (ref 2.5–4.5)
PHOSPHATE SERPL-MCNC: 3.6 MG/DL (ref 2.5–4.5)
PHOSPHATE SERPL-MCNC: 3.9 MG/DL (ref 2.5–4.5)
PHOSPHATE SERPL-MCNC: 5 MG/DL (ref 2.5–4.5)
PLATELET # BLD AUTO: 111 10E9/L (ref 150–450)
PLATELET # BLD AUTO: 79 10E9/L (ref 150–450)
PLATELET # BLD AUTO: 89 10E9/L (ref 150–450)
PLATELET # BLD AUTO: 89 10E9/L (ref 150–450)
PLATELET # BLD AUTO: 98 10E9/L (ref 150–450)
PLATELET # BLD EST: ABNORMAL 10*3/UL
PLATELET # BLD EST: ABNORMAL 10*3/UL
POTASSIUM SERPL-SCNC: 4 MMOL/L (ref 3.4–5.3)
POTASSIUM SERPL-SCNC: 4 MMOL/L (ref 3.4–5.3)
POTASSIUM SERPL-SCNC: 4.1 MMOL/L (ref 3.4–5.3)
POTASSIUM SERPL-SCNC: 4.3 MMOL/L (ref 3.4–5.3)
PROT SERPL-MCNC: 4.8 G/DL (ref 6.8–8.8)
PROT SERPL-MCNC: 4.9 G/DL (ref 6.8–8.8)
PROT SERPL-MCNC: 5 G/DL (ref 6.8–8.8)
PROT SERPL-MCNC: 5.1 G/DL (ref 6.8–8.8)
RADIOLOGIST FLAGS: ABNORMAL
RBC # BLD AUTO: 2.35 10E12/L (ref 4.4–5.9)
RBC # BLD AUTO: 2.36 10E12/L (ref 4.4–5.9)
RBC # BLD AUTO: 2.39 10E12/L (ref 4.4–5.9)
RBC # BLD AUTO: 2.44 10E12/L (ref 4.4–5.9)
RBC # BLD AUTO: 2.48 10E12/L (ref 4.4–5.9)
SODIUM SERPL-SCNC: 137 MMOL/L (ref 133–144)
SODIUM SERPL-SCNC: 138 MMOL/L (ref 133–144)
SODIUM SERPL-SCNC: 138 MMOL/L (ref 133–144)
SODIUM SERPL-SCNC: 139 MMOL/L (ref 133–144)
TACROLIMUS BLD-MCNC: 9.5 UG/L (ref 5–15)
TME LAST DOSE: NORMAL H
WBC # BLD AUTO: 11.7 10E9/L (ref 4–11)
WBC # BLD AUTO: 12.9 10E9/L (ref 4–11)
WBC # BLD AUTO: 13.2 10E9/L (ref 4–11)
WBC # BLD AUTO: 14.5 10E9/L (ref 4–11)
WBC # BLD AUTO: 14.8 10E9/L (ref 4–11)

## 2019-03-12 PROCEDURE — 82330 ASSAY OF CALCIUM: CPT | Performed by: GENERAL ACUTE CARE HOSPITAL

## 2019-03-12 PROCEDURE — 25000132 ZZH RX MED GY IP 250 OP 250 PS 637: Performed by: INTERNAL MEDICINE

## 2019-03-12 PROCEDURE — 85520 HEPARIN ASSAY: CPT | Performed by: GENERAL ACUTE CARE HOSPITAL

## 2019-03-12 PROCEDURE — 27210429 ZZH NUTRITION PRODUCT INTERMEDIATE LITER

## 2019-03-12 PROCEDURE — 85520 HEPARIN ASSAY: CPT | Performed by: PHYSICIAN ASSISTANT

## 2019-03-12 PROCEDURE — 71045 X-RAY EXAM CHEST 1 VIEW: CPT

## 2019-03-12 PROCEDURE — 97535 SELF CARE MNGMENT TRAINING: CPT | Mod: GO | Performed by: OCCUPATIONAL THERAPIST

## 2019-03-12 PROCEDURE — 83735 ASSAY OF MAGNESIUM: CPT | Performed by: PHYSICIAN ASSISTANT

## 2019-03-12 PROCEDURE — 25000132 ZZH RX MED GY IP 250 OP 250 PS 637: Performed by: SURGERY

## 2019-03-12 PROCEDURE — 80053 COMPREHEN METABOLIC PANEL: CPT | Performed by: GENERAL ACUTE CARE HOSPITAL

## 2019-03-12 PROCEDURE — 25000132 ZZH RX MED GY IP 250 OP 250 PS 637: Performed by: STUDENT IN AN ORGANIZED HEALTH CARE EDUCATION/TRAINING PROGRAM

## 2019-03-12 PROCEDURE — 90947 DIALYSIS REPEATED EVAL: CPT

## 2019-03-12 PROCEDURE — 25000125 ZZHC RX 250: Performed by: INTERNAL MEDICINE

## 2019-03-12 PROCEDURE — 25000131 ZZH RX MED GY IP 250 OP 636 PS 637: Performed by: INTERNAL MEDICINE

## 2019-03-12 PROCEDURE — 82330 ASSAY OF CALCIUM: CPT | Performed by: PHYSICIAN ASSISTANT

## 2019-03-12 PROCEDURE — 25800030 ZZH RX IP 258 OP 636: Performed by: GENERAL ACUTE CARE HOSPITAL

## 2019-03-12 PROCEDURE — 20000004 ZZH R&B ICU UMMC

## 2019-03-12 PROCEDURE — 97116 GAIT TRAINING THERAPY: CPT | Mod: GP

## 2019-03-12 PROCEDURE — 25000128 H RX IP 250 OP 636: Performed by: INTERNAL MEDICINE

## 2019-03-12 PROCEDURE — 84100 ASSAY OF PHOSPHORUS: CPT | Performed by: GENERAL ACUTE CARE HOSPITAL

## 2019-03-12 PROCEDURE — 00000146 ZZHCL STATISTIC GLUCOSE BY METER IP

## 2019-03-12 PROCEDURE — 85025 COMPLETE CBC W/AUTO DIFF WBC: CPT | Performed by: GENERAL ACUTE CARE HOSPITAL

## 2019-03-12 PROCEDURE — 25000125 ZZHC RX 250: Performed by: GENERAL ACUTE CARE HOSPITAL

## 2019-03-12 PROCEDURE — 99233 SBSQ HOSP IP/OBS HIGH 50: CPT | Mod: GC | Performed by: INTERNAL MEDICINE

## 2019-03-12 PROCEDURE — 97530 THERAPEUTIC ACTIVITIES: CPT | Mod: GP

## 2019-03-12 PROCEDURE — 80053 COMPREHEN METABOLIC PANEL: CPT | Performed by: PHYSICIAN ASSISTANT

## 2019-03-12 PROCEDURE — 84100 ASSAY OF PHOSPHORUS: CPT | Performed by: PHYSICIAN ASSISTANT

## 2019-03-12 PROCEDURE — 85025 COMPLETE CBC W/AUTO DIFF WBC: CPT | Performed by: PHYSICIAN ASSISTANT

## 2019-03-12 PROCEDURE — 97530 THERAPEUTIC ACTIVITIES: CPT | Mod: GO | Performed by: OCCUPATIONAL THERAPIST

## 2019-03-12 PROCEDURE — 80197 ASSAY OF TACROLIMUS: CPT | Performed by: INTERNAL MEDICINE

## 2019-03-12 PROCEDURE — 83735 ASSAY OF MAGNESIUM: CPT | Performed by: GENERAL ACUTE CARE HOSPITAL

## 2019-03-12 RX ORDER — MYCOPHENOLATE MOFETIL 250 MG/1
1000 CAPSULE ORAL 2 TIMES DAILY
Status: DISCONTINUED | OUTPATIENT
Start: 2019-03-12 | End: 2019-03-16

## 2019-03-12 RX ORDER — POLYETHYLENE GLYCOL 3350 17 G/17G
17 POWDER, FOR SOLUTION ORAL DAILY PRN
Status: DISCONTINUED | OUTPATIENT
Start: 2019-03-12 | End: 2019-04-03 | Stop reason: HOSPADM

## 2019-03-12 RX ORDER — MYCOPHENOLATE MOFETIL 200 MG/ML
1000 POWDER, FOR SUSPENSION ORAL 2 TIMES DAILY
Status: DISCONTINUED | OUTPATIENT
Start: 2019-03-12 | End: 2019-03-20

## 2019-03-12 RX ADMIN — MYCOPHENOLATE MOFETIL 1000 MG: 200 POWDER, FOR SUSPENSION ORAL at 19:46

## 2019-03-12 RX ADMIN — Medication 5 ML: at 08:19

## 2019-03-12 RX ADMIN — PIPERACILLIN AND TAZOBACTAM 4.5 G: 4; .5 INJECTION, POWDER, FOR SOLUTION INTRAVENOUS at 08:12

## 2019-03-12 RX ADMIN — MESALAMINE 2400 MG: 800 TABLET, DELAYED RELEASE ORAL at 19:38

## 2019-03-12 RX ADMIN — FLUTICASONE PROPIONATE 2 PUFF: 220 AEROSOL, METERED RESPIRATORY (INHALATION) at 19:38

## 2019-03-12 RX ADMIN — Medication 500000 UNITS: at 12:38

## 2019-03-12 RX ADMIN — CALCIUM CHLORIDE, MAGNESIUM CHLORIDE, SODIUM CHLORIDE, SODIUM BICARBONATE, POTASSIUM CHLORIDE AND SODIUM PHOSPHATE DIBASIC DIHYDRATE 12.5 ML/KG/HR: 3.68; 3.05; 6.34; 3.09; .314; .187 INJECTION INTRAVENOUS at 00:32

## 2019-03-12 RX ADMIN — MONTELUKAST SODIUM 10 MG: 10 TABLET, COATED ORAL at 22:02

## 2019-03-12 RX ADMIN — MESALAMINE 2400 MG: 800 TABLET, DELAYED RELEASE ORAL at 08:13

## 2019-03-12 RX ADMIN — ACETAMINOPHEN 650 MG: 325 TABLET, FILM COATED ORAL at 03:54

## 2019-03-12 RX ADMIN — CALCIUM CHLORIDE, MAGNESIUM CHLORIDE, SODIUM CHLORIDE, SODIUM BICARBONATE, POTASSIUM CHLORIDE AND SODIUM PHOSPHATE DIBASIC DIHYDRATE 12.5 ML/KG/HR: 3.68; 3.05; 6.34; 3.09; .314; .187 INJECTION INTRAVENOUS at 15:13

## 2019-03-12 RX ADMIN — Medication 500000 UNITS: at 20:08

## 2019-03-12 RX ADMIN — TERBUTALINE SULFATE 10 MG: 5 TABLET ORAL at 16:31

## 2019-03-12 RX ADMIN — Medication 0.5 MG: at 18:03

## 2019-03-12 RX ADMIN — ACETAMINOPHEN 650 MG: 325 TABLET, FILM COATED ORAL at 12:38

## 2019-03-12 RX ADMIN — PREDNISONE 15 MG: 10 TABLET ORAL at 18:02

## 2019-03-12 RX ADMIN — FLUTICASONE PROPIONATE 2 PUFF: 220 AEROSOL, METERED RESPIRATORY (INHALATION) at 08:29

## 2019-03-12 RX ADMIN — Medication 500000 UNITS: at 16:33

## 2019-03-12 RX ADMIN — CALCIUM CHLORIDE 1 G: 100 INJECTION, SOLUTION INTRAVENOUS at 00:57

## 2019-03-12 RX ADMIN — SILDENAFIL 20 MG: 20 TABLET ORAL at 19:44

## 2019-03-12 RX ADMIN — TERBUTALINE SULFATE 10 MG: 5 TABLET ORAL at 23:50

## 2019-03-12 RX ADMIN — MYCOPHENOLATE MOFETIL 1000 MG: 200 POWDER, FOR SUSPENSION ORAL at 08:19

## 2019-03-12 RX ADMIN — PIPERACILLIN AND TAZOBACTAM 4.5 G: 4; .5 INJECTION, POWDER, FOR SOLUTION INTRAVENOUS at 14:03

## 2019-03-12 RX ADMIN — CALCIUM CHLORIDE, MAGNESIUM CHLORIDE, SODIUM CHLORIDE, SODIUM BICARBONATE, POTASSIUM CHLORIDE AND SODIUM PHOSPHATE DIBASIC DIHYDRATE 3.66 ML/KG/HR: 3.68; 3.05; 6.34; 3.09; .314; .187 INJECTION INTRAVENOUS at 18:32

## 2019-03-12 RX ADMIN — ACETAMINOPHEN 650 MG: 325 TABLET, FILM COATED ORAL at 20:00

## 2019-03-12 RX ADMIN — VALGANCICLOVIR HYDROCHLORIDE 450 MG: 50 POWDER, FOR SOLUTION ORAL at 08:20

## 2019-03-12 RX ADMIN — TERBUTALINE SULFATE 10 MG: 5 TABLET ORAL at 08:20

## 2019-03-12 RX ADMIN — PIPERACILLIN AND TAZOBACTAM 4.5 G: 4; .5 INJECTION, POWDER, FOR SOLUTION INTRAVENOUS at 01:55

## 2019-03-12 RX ADMIN — CALCIUM CHLORIDE, MAGNESIUM CHLORIDE, SODIUM CHLORIDE, SODIUM BICARBONATE, POTASSIUM CHLORIDE AND SODIUM PHOSPHATE DIBASIC DIHYDRATE 12.5 ML/KG/HR: 3.68; 3.05; 6.34; 3.09; .314; .187 INJECTION INTRAVENOUS at 00:31

## 2019-03-12 RX ADMIN — CALCIUM CHLORIDE, MAGNESIUM CHLORIDE, SODIUM CHLORIDE, SODIUM BICARBONATE, POTASSIUM CHLORIDE AND SODIUM PHOSPHATE DIBASIC DIHYDRATE 12.5 ML/KG/HR: 3.68; 3.05; 6.34; 3.09; .314; .187 INJECTION INTRAVENOUS at 08:07

## 2019-03-12 RX ADMIN — CALCIUM CHLORIDE, MAGNESIUM CHLORIDE, SODIUM CHLORIDE, SODIUM BICARBONATE, POTASSIUM CHLORIDE AND SODIUM PHOSPHATE DIBASIC DIHYDRATE 12.5 ML/KG/HR: 3.68; 3.05; 6.34; 3.09; .314; .187 INJECTION INTRAVENOUS at 15:12

## 2019-03-12 RX ADMIN — LEVOTHYROXINE SODIUM 100 MCG: 100 TABLET ORAL at 08:13

## 2019-03-12 RX ADMIN — Medication 0.5 MG: at 08:20

## 2019-03-12 RX ADMIN — CALCIUM CHLORIDE, MAGNESIUM CHLORIDE, SODIUM CHLORIDE, SODIUM BICARBONATE, POTASSIUM CHLORIDE AND SODIUM PHOSPHATE DIBASIC DIHYDRATE 12.5 ML/KG/HR: 3.68; 3.05; 6.34; 3.09; .314; .187 INJECTION INTRAVENOUS at 22:23

## 2019-03-12 RX ADMIN — CALCIUM CHLORIDE, MAGNESIUM CHLORIDE, SODIUM CHLORIDE, SODIUM BICARBONATE, POTASSIUM CHLORIDE AND SODIUM PHOSPHATE DIBASIC DIHYDRATE 12.5 ML/KG/HR: 3.68; 3.05; 6.34; 3.09; .314; .187 INJECTION INTRAVENOUS at 22:24

## 2019-03-12 RX ADMIN — PREDNISONE 20 MG: 20 TABLET ORAL at 08:13

## 2019-03-12 RX ADMIN — SILDENAFIL 20 MG: 20 TABLET ORAL at 08:13

## 2019-03-12 RX ADMIN — RANITIDINE 150 MG: 150 TABLET ORAL at 08:13

## 2019-03-12 RX ADMIN — ACETAMINOPHEN 650 MG: 325 TABLET, FILM COATED ORAL at 08:19

## 2019-03-12 RX ADMIN — PIPERACILLIN AND TAZOBACTAM 4.5 G: 4; .5 INJECTION, POWDER, FOR SOLUTION INTRAVENOUS at 19:54

## 2019-03-12 RX ADMIN — DULOXETINE 20 MG: 20 CAPSULE, DELAYED RELEASE ORAL at 08:13

## 2019-03-12 RX ADMIN — SILDENAFIL 20 MG: 20 TABLET ORAL at 14:14

## 2019-03-12 RX ADMIN — Medication 500000 UNITS: at 08:29

## 2019-03-12 RX ADMIN — ACETAMINOPHEN 650 MG: 325 TABLET, FILM COATED ORAL at 23:50

## 2019-03-12 RX ADMIN — ROSUVASTATIN CALCIUM 10 MG: 10 TABLET, FILM COATED ORAL at 08:13

## 2019-03-12 RX ADMIN — ACETAMINOPHEN 650 MG: 325 TABLET, FILM COATED ORAL at 16:32

## 2019-03-12 RX ADMIN — MELATONIN TAB 3 MG 3 MG: 3 TAB at 19:44

## 2019-03-12 RX ADMIN — HEPARIN SODIUM 900 UNITS/HR: 10000 INJECTION, SOLUTION INTRAVENOUS at 21:10

## 2019-03-12 ASSESSMENT — ACTIVITIES OF DAILY LIVING (ADL)
ADLS_ACUITY_SCORE: 15

## 2019-03-12 ASSESSMENT — PAIN DESCRIPTION - DESCRIPTORS
DESCRIPTORS: DISCOMFORT;SORE
DESCRIPTORS: DISCOMFORT;SORE

## 2019-03-12 ASSESSMENT — MIFFLIN-ST. JEOR: SCORE: 1407.5

## 2019-03-12 NOTE — PROGRESS NOTES
Nephrology Progress Note  March 12, 2019      Today Recommendations:  Continue CRRT - goal neg 1-1.2 L in 24 hours    ASSESSMENT AND RECOMMENDATIONS:   Mr. Larios is a 53 year old male with history of chronic renal insufficiency, EtOH abuse, GIB w/splenic embolization, PAF s/p ablation and cardioversion, NICM c/b cardiogenic shock requiring IABP, s/p OHT (2/24/19) on tacrolimus and MMF, complicated by RV dysfunction per echo and acute oliguric kidney injury.      # Anuric FRANKLIN on CKD stage II-III:  Patient with history of chronic renal insufficiency and cortical scarring of the L kidney per Renal US 03/2016. Baseline Crt 1.1- 1.4 over the last 6 months; however, prior BMPs have demonstrated patient Crt as high as 2.0. He most likely has a baseline level of CKD due to renal artery stenosis (longstanding smoking history, abdominal bruit on physical examination) and possible smaller FRANKLIN episodes from HFrEF exacerbations. Current rise in creatinine started 3/1 at 1.89, and increasing to 4.01 3/7 and oliguria is most likely due to nephrotoxicity associated with tacrolimus (a renal vasoconstrictor), and diuretic use.  ----  At this time, remains anuric. Given BP and overall clinical improvement with CRRT, will continue CRRT for management of FRANKLIN.     Immunosuppressants:  MMF, Tacrolimus, managed by transplant team.      #Electrolytes. Acid-base:  Acceptable on CRRT.     #Hypotension-Volume status:  Overall, on low dose Dobutamine with acceptable BP on CRRT. Plan to pull UF as tolerated with goal Weight of ~54 kg. Will plan to pull gently given OHT and tenuous hemodynamics.     #Anemia:  Hgb stable ~ 7. No obvious clinical bleeding. Transfusion per primary.     Recommendations were communicated to primary team via Note.      Seen and discussed with Dr. Paul.     JANET Herbert Md  [id number]    Interval History :   Nursing and provider notes from last 24 hours reviewed.  NAEON. Remains intermittently confused. More alert  during the day. He is very lethargic during my encounter this AM. Remains anuric. Tolerating CRRT. Remains on dobutamine. No fevers.        MEDICATIONS:  Current Meds    B and C vitamin Complex with folic acid  5 mL Per Feeding Tube Daily     DULoxetine  20 mg Oral Daily     fluticasone  2 puff Inhalation Q12H     heparin lock flush  5 mL Intravenous Q24H     heparin lock flush  5-10 mL Intracatheter Q24H     levothyroxine  100 mcg Oral Daily     melatonin  3 mg Oral At Bedtime     mesalamine  2,400 mg Oral BID     montelukast  10 mg Oral At Bedtime     mycophenolate  1,000 mg Oral or Feeding Tube BID    And     mycophenolate  1,000 mg Oral BID     nystatin  500,000 Units Oral 4x Daily     piperacillin-tazobactam  4.5 g Intravenous Q6H     predniSONE  15 mg Oral BID w/meals     ranitidine  150 mg Oral Daily     rosuvastatin  10 mg Oral Daily     senna-docusate  1 tablet Oral BID    Or     senna-docusate  2 tablet Oral BID     sildenafil  20 mg Oral TID     sodium chloride (PF)  3 mL Intracatheter Q8H     tacrolimus  0.5 mg Oral BID IS    Or     tacrolimus  0.5 mg Per Feeding Tube BID IS     terbutaline  10 mg Oral Q8H     valGANciclovir  450 mg Oral or Feeding Tube Every Other Day     Infusion Meds    IV fluid REPLACEMENT ONLY       CRRT replacement solution 12.5 mL/kg/hr (19 1512)     DOBUTamine 2.5 mcg/kg/min (19 1600)     HEParin 900 Units/hr (19 1600)     - MEDICATION INSTRUCTIONS -       - MEDICATION INSTRUCTIONS -       - MEDICATION INSTRUCTIONS -       CRRT replacement solution 3.656 mL/kg/hr (19 1721)     CRRT replacement solution 12.5 mL/kg/hr (19 1513)     BETA BLOCKER NOT PRESCRIBED       REVIEW OF SYSTEMS:  A comprehensive of systems was not obtained due to mental status.      PHYSICAL EXAM:   Temp  Av.9  F (36.6  C)  Min: 95.9  F (35.5  C)  Max: 100  F (37.8  C)  Arterial Line BP  Min: 68/56  Max: 182/36  Arterial Line BP 2  Min: 90/54  Max: 107/56  Arterial Line MAP  "(mmHg)  Av.7 mmHg  Min: 57 mmHg  Max: 123 mmHg Arterial Line Location 2: Right femoral    Pulse  Av.5  Min: 59  Max: 140 Resp  Av.1  Min: 8  Max: 45  FiO2 (%)  Av %  Min: 40 %  Max: 100 %  SpO2  Av.1 %  Min: 88 %  Max: 100 %    CVP (mmHg): 21 mmHg  BP (!) 109/93 (BP Location: Right arm)   Pulse 96   Temp 96.6  F (35.9  C) (Axillary)   Resp 18   Ht 1.727 m (5' 8\")   Wt 59.8 kg (131 lb 13.4 oz)   SpO2 100%   BMI 20.05 kg/m     Date 19 07 - 19 0659   Shift 3839-8505 1097-9988 1688-8996 24 Hour Total   INTAKE   P.O. 100   100   I.V. 70.4   70.4   Shift Total(mL/kg) 170.4(2.74)   170.4(2.74)   OUTPUT   Chest Tube(mL/kg) 238(3.83)   238(3.83)   Shift Total(mL/kg) 238(3.83)   238(3.83)   Weight (kg) 62.1 62.1 62.1 62.1      Admit Weight: 63.5 kg (140 lb 1.6 oz)     GENERAL APPEARANCE: Requires constant arousal  Lymphatics: no cervical  LAD  Pulmonary: lungs clear to auscultation with equal breath sounds bilaterally  CV: regular rhythm, normal rate   - Edema: 1+ B/L  GI: soft, nontender, normal bowel sounds.  SKIN: no rash, warm  NEURO: face symmetric, unable to follow commands due to mental status    LABS:   I personally reviewed the labs.     CMP  Recent Labs   Lab 19  1548 19  1006 19  0323 19  2258    137 139 136   POTASSIUM 4.3 4.0 4.1 4.2   CHLORIDE 105 106 105 106   CO2 20 22 20 18*   ANIONGAP 12 9 14 12   * 127* 116* 99   BUN 26 29 30 34*   CR 1.46* 1.57* 1.67* 1.72*   GFRESTIMATED 53* 49* 45* 43*   GFRESTBLACK 61 56* 52* 50*   RADAMES 7.8* 8.0* 8.4* 7.6*   MAG 2.5* 2.4* 2.6* 2.5*   PHOS 3.6 3.9 5.0* 4.9*   PROTTOTAL 5.1* 5.0* 4.8* 5.0*   ALBUMIN 2.6* 2.4* 2.5* 2.4*   BILITOTAL 0.9 0.9 0.9 0.9   ALKPHOS 107 102 98 97   AST 38 35 30 31   ALT 25 23 22 22     CBC  Recent Labs   Lab 19  1548 19  1006 19  0323 19  2258   HGB 7.3* 7.5* 7.2* 7.2*   WBC 14.5* 12.9* 11.7* 13.2*   RBC 2.44* 2.48* 2.39* 2.36*   HCT 23.3* " 23.4* 22.8* 22.7*   MCV 96 94 95 96   MCH 29.9 30.2 30.1 30.5   MCHC 31.3* 32.1 31.6 31.7   RDW 16.7* 16.6* 16.2* 16.7*   PLT 89* 89* 98* 111*     INR  Recent Labs   Lab 03/11/19  0338   INR 1.50*     ABG  Recent Labs   Lab 03/11/19  1152 03/10/19  1214 03/09/19  0400 03/08/19  2147   O2PER 21 21 21 21.0      URINE STUDIES  Recent Labs   Lab Test 03/04/19  1303 02/23/19  1921 02/18/19  0957 11/15/18  1000   COLOR Yellow Yellow Yellow Yellow   APPEARANCE Clear Clear Clear Clear   URINEGLC Negative Negative Negative Negative   URINEBILI Negative Negative Negative Negative   URINEKETONE Negative Negative Negative Negative   SG 1.014 1.008 1.008 1.015   UBLD Negative Negative Negative Negative   URINEPH 5.0 6.0 5.5 5.0   PROTEIN Negative Negative Negative Negative   NITRITE Negative Negative Negative Negative   LEUKEST Negative Negative Negative Negative   RBCU 0 <1 <1 <1   WBCU 0 <1 1 1     No lab results found.  PTH  No lab results found.  IRON STUDIES  Recent Labs   Lab Test 11/15/18  0955 10/11/18  1235   IRON  --  63   FEB  --  457*   IRONSAT  --  14*   NATE 1,045*  --        IMAGING:  Reviewed      I, Abhijeet Paul, saw this patient on 03/12/2019 with Dr. Herbert and agree with the findings and plan of care as documented in the note.

## 2019-03-12 NOTE — PROGRESS NOTES
CRRT STATUS NOTE    DATA:  Time:  4:49 PM  Pressures WNL:  YES  Filter Status:  WDL    Problems Reported/Alarms Noted:  Access alarms resolved.    Supplies Present:  YES    ASSESSMENT:  Patient Net Fluid Balance:  Net - 1684 ml since MN 3/12  Vital Signs:  T=96.6 ax, HR=89, PZ=604/93 with MAP=97. Pt is on Dobutamine gtt.   Labs:  K+=3.8, Hgb=7.3, WBCs=14.5 and Plts=89  Goals of Therapy:  0-50cc/hr as BP allows    INTERVENTIONS:   Discussed POC with bedside RN.    PLAN:  Continue POC. Call CRRT RN at [phone] with any questions.

## 2019-03-12 NOTE — PLAN OF CARE
Discharge Planner OT   Patient plan for discharge: not stated  Current status: Pt completed MoCA and scored 5/30 correct, normal limits 26+/30 correct. Pt's VSS throughout.   Barriers to return to prior living situation: medical status  Recommendations for discharge: ARU   Rationale for recommendations: to increase ind in ADLS/IADLS       Entered by: Kat Staton 03/12/2019 9:47 AM

## 2019-03-12 NOTE — PROGRESS NOTES
ADVANCED HEART FAILURE PROGRESS NOTE    SUBJECTIVE:  No acute events. More awake and alert this morning.    ROS otherwise negative.    OBJECTIVE:  Vital signs:  Temp: 97.3  F (36.3  C) Temp  Min: 96.9  F (36.1  C)  Max: 97.6  F (36.4  C)  Heart Rate: 93 Heart Rate  Min: 93  Max: 98  BP: 106/60 Systolic (24hrs), Av , Min:91 , Max:117   Diastolic (24hrs), Av, Min:49, Max:66    Resp: 12 Resp  Min: 11  Max: 19  SpO2: 99 % SpO2  Min: 98 %  Max: 99 %      Intake/Output Summary (Last 24 hours) at 3/12/2019 0730  Last data filed at 3/12/2019 0700  Gross per 24 hour   Intake 2265.46 ml   Output 3420 ml   Net -1154.54 ml       Vitals:    03/10/19 0000 19 0000 19 0400   Weight: 62.6 kg (138 lb 0.1 oz) 62 kg (136 lb 11 oz) 59.8 kg (131 lb 13.4 oz)       Physical Exam:  GEN:  no distress, mildly confused but alert and appropriate  CV:  Regular rate and rhythm, no murmur.   CHEST: Subclavian incision sutured.  Chest tubes.  LUNGS:  On room air, normal work of breathing, Clear to auscultation bilaterally, no wheezes or crackles.   ABD:  Active bowel sounds, soft, non-tender/non-distended.  No rebound/guarding/rigidity.  EXT:  No edema or cyanosis. Normal distal puses  NEURO: no focal deficits      Medications:    IV fluid REPLACEMENT ONLY       CRRT replacement solution 12.5 mL/kg/hr (19)     DOBUTamine 2.5 mcg/kg/min (19 0700)     HEParin 900 Units/hr (19)     - MEDICATION INSTRUCTIONS -       - MEDICATION INSTRUCTIONS -       - MEDICATION INSTRUCTIONS -       CRRT replacement solution 3.656 mL/kg/hr (19)     CRRT replacement solution 12.5 mL/kg/hr (19)     BETA BLOCKER NOT PRESCRIBED         Current Facility-Administered Medications   Medication Dose Route Frequency     B and C vitamin Complex with folic acid  5 mL Per Feeding Tube Daily     DULoxetine  20 mg Oral Daily     fluticasone  2 puff Inhalation Q12H     heparin lock flush  5 mL Intravenous Q24H      heparin lock flush  5-10 mL Intracatheter Q24H     levothyroxine  100 mcg Oral Daily     melatonin  3 mg Oral At Bedtime     mesalamine  2,400 mg Oral BID     montelukast  10 mg Oral At Bedtime     mycophenolate  1,000 mg Oral or Feeding Tube BID     nystatin  500,000 Units Oral 4x Daily     piperacillin-tazobactam  4.5 g Intravenous Q6H     polyethylene glycol  17 g Oral Daily     predniSONE  15 mg Oral QPM     predniSONE  20 mg Oral Daily     ranitidine  150 mg Oral Daily     rosuvastatin  10 mg Oral Daily     senna-docusate  1 tablet Oral BID    Or     senna-docusate  2 tablet Oral BID     sildenafil  20 mg Oral TID     sodium chloride (PF)  3 mL Intracatheter Q8H     tacrolimus  0.5 mg Oral BID IS    Or     tacrolimus  0.5 mg Per Feeding Tube BID IS     terbutaline  10 mg Oral Q8H     valGANciclovir  450 mg Oral or Feeding Tube Every Other Day         Labs:  CMP  Recent Labs   Lab 03/12/19  0323 03/11/19  2258  03/10/19  0410  03/09/19  1148    136   < > 133   < >  --    POTASSIUM 4.1 4.2   < > 4.6   < >  --    CHLORIDE 105 106   < > 102   < >  --    CO2 20 18*   < > 18*   < >  --    BUN 30 34*   < > 58*   < >  --    CR 1.67* 1.72*   < > 2.61*   < >  --    RADAMES 8.4* 7.6*   < > 8.0*   < >  --    MAG 2.6* 2.5*   < > 2.7*   < >  --    PHOS 5.0* 4.9*   < > 5.8*   < >  --    * 99   < > 88   < >  --    LDH  --   --   --  276*  --  266*   ALKPHOS 98 97   < > 104   < >  --    BILITOTAL 0.9 0.9   < > 0.8   < >  --    AST 30 31   < > 22   < >  --    ALT 22 22   < > 19   < >  --     < > = values in this interval not displayed.     BLOOD GASNo lab results found in last 7 days.  CBC  Recent Labs   Lab 03/12/19 0323 03/11/19 2258   WBC 11.7* 13.2*   HGB 7.2* 7.2*   HCT 22.8* 22.7*   PLT 98* 111*     COAG  Recent Labs   Lab 03/11/19  0338   INR 1.50*         ASSESSMENT/PLAN:  Everton Larios is a 52 yo gentleman who underwent orthotopic heart transplant 2/24/19 for non ischemic cardiomyopathy and cardiogenic  shock. Post-op complicated by RV dysfunction and FRANKLIN.     Updates today:  - continue CRRT, likely transition to intermittent HD tomorrow  - RHC and biopsy # 2 today     #Oliguric renal failure:  Etiology cardiorenal from venous congestion vs. nephrotoxins.  - now on CRRT, pulling fluid as tolerated  - nephrology consulted, appreciate assistance  - may transition to intermittent HD tomorrow     #Sepsis  - empiric coverage with vancomycin (3/9 - 3/11 ), zosyn (3/9 - plan 7 day course)  - cultures no growth to date  - US of right chest wall swelling shows stable hematoma, no overt infection     #S/p OHT on 2/24:  Graft function:  - echo 3/1 shows normal LV function, RV is mild to moderately dilated and hypokinetic  - dobutamine at 2.5 for continued RV and hemodynamic support  - continue sildenafil 20 q8h  - off isuprel, continue terbutaline 10 q8h     Immunosuppression:  - on MMF decreased to 1000 BID given possible sepsis  - s/p methylpred 125 x 3 doses, prednisone down to 15mg BID, decreasing by 5mg daily with negative biopsies  - NOT Simulect protocol, resume tacro 0.5 bid, monitoring level     Prophylaxis:  - CMV +/+: medium risk, start renal dose Valcyte  - PCP: holding Bactrim for now, pentamadine given 3/3  - Thrush: start nystatin  - GI: on PPI  - CAV: start Crestor, holding ASA given bleeding at line sites     Biopsies:  - First biopsy 3/4, 0R, no AMR  - Second biopsy 3/11, 0R, no AMR  - Third biopsy due 3/18     Goretex conduit for persistent left SVC:  - will need anticoagulation, on low intensity heparin  - NO CENTRAL VENOUS CATHETERS ON THE LEFT UPPER BODY        Seen and staffed with Dr. Galan.      Trev Harris MD  Advanced Heart Failure Fellow  [phone]

## 2019-03-12 NOTE — PLAN OF CARE
AVSS. Pt sleeping between care and MS improving overnight. A/O x4 at MN and 0400. Dobutamine gtt continues at 2.5 mcg/kg/min. Heparin gtt increased to 900 units/hr 2/2 aXa 0.12. Anuric. Tolerating fluid removal on CRRT, net negative 547 mL since MN. TF increased to 25 mL/hr at 0200. Loose/watery BM x2 overnight. Tylenol given x2 for generalized pain/discomfort.     Encourage normal sleep/wake cycle and PO intake as tolerated. Increase TF to 35 mL/hr and recheck aXa at 1000. See charting for detailed assessments and interventions.

## 2019-03-12 NOTE — PROVIDER NOTIFICATION
At 1200 assessment this writer went to listen to patient's lung sounds and on the right upper lobe right below his incision there is a large fist sized bump. The lump is hard with soft borders.  Notified Dr. Leyla Bowling who came and assessed patient.    Plan:  Chest xray   Continue to monitor size.     Addendum:  Xray cancelled. Patient had an ultrasound of the area on 3/9 that revealed a hematoma.

## 2019-03-12 NOTE — PLAN OF CARE
Discharge Planner PT   Patient plan for discharge: rehab  Current status:   Supine to sit with mod Ax1 using log roll technique. Min Ax1 to roll and side lying to sit with max A 2/2 sternal precautions.  STS x1 from bed with min Ax1 and FWW.  Vc to avoid using UE.  Abides by sternal precautions in bed.  STS x3 repeated from low chair and 2 additional reps during session with increase A needed from UE.  Is impulsive when using UE despite vc to avoid.  Requires mod A to stand.  Periodically throughout session, pt making comments that are not congruent with conversation.  Amb 4ft forward and back x3 ft limited by lines with FWW and CGAx1.  Barriers to return to prior living situation: fatigues quickly, cognitive impairment   Recommendations for discharge: ARU  Rationale for recommendations: Would benefit from additional skilled PT in order to increase IND mobility.        Entered by: Laureano Seymour 03/12/2019 4:50 PM

## 2019-03-12 NOTE — PROGRESS NOTES
CVTS PROGRESS NOTE  March 12, 2019      CO-MORBIDITIES:   Chronic systolic heart failure (H)  (primary encounter diagnosis)  Acute on chronic systolic congestive heart failure (H)    ASSESSMENT: Everton Larios is a 53 year old male with PMH etoh abuse, hypothyroidism, intermittent asthma, ulcerative colitis, Depression, Chronic HFrEF, NICM admitted with cardiogenic shock requiring subclavian balloon pump now s/p OHT 2/24/19 with [facility] and [facility].      TODAY'S PROGRESS:   - Delirium appears to be improving; optimize sleep/wake cycle  - Touch base with cards II re antibiotic duration  - Switch miralax to PRN   - Remove pacing wires   - Plan to transition to iHD tomorrow    PLAN:  Neuro/ pain/ sedation:  - Monitor neurological status. Notify the MD for any acute changes in exam.  - Tylenol prn for pain. Hold narcotics for altered mental status.    #ICU delirium, slowly improving  - Head CT 3/9 negative  - Optimize sleep/wake cycle, melatonin for sleep, will schedule seroquel pm       #Depression  - PTA cymbalta 20mg     Pulmonary care:   - Doing well on room air  - Supp oxygen to maintain SpO2 >92%  - Encourage IS and deep breathing  - Bilateral pleural chest tubes to suction.     #Intermittent asthma  - PTA Montelukast ordered    Cardiovascular: HR goal >95. Echo 3/5 with good LV function, mild decreased RV function (moderately dilated).  - R heart cath and biopsy 3/4 and 3/11, biopsies negative for rejection.  - Monitor hemodynamic status.   - MAP >65. HR goal > 95.  - Dobutamine 2.5 mcg/kg/min, sildenafil 20 q8h, terbutaline 10 mg q8h  - Continue statin. No aspirin per cards.   - Pacing wires capped. Remove today.    GI care:   #Severe malnutrition  - Regular diet  - NJ tube feeds for nutritional supplementation  - Make bowel regimen PRN.      #Intraperitoneal free air (CT obtained 2/26)   - Small peritoneal defect made during redo operation, closed with stitches intra-op- likely source of free air.  Resolved.    #Pierce's esophagus  #Ulcerative colitis  -PTA Mesalamine     Fluids/ Electrolytes/ Nutrition:   - TKO for IV fluid hydration  - TF for enteral nutritional support as above.  - No indication for parenteral nutrition.  - Replace electrolytes as needed.  #Hyponatremia  - Asymptomatic/improved on CRRT    Renal/ Fluid Balance:   #FRANKLIN, oliguric.    - Etiology likely cardiorenal from venous congestion vs nephrotoxicity from multiple medications  - CRRT started (3/9), plan to transition to iHD 3/13  - Will continue to monitor intake and output.  - PTA Tamsulosin      Endocrine:    # Stress hyperglycemia  - Resolved. No ongoing BG monitoring.  # hypothyroidism  - Home levothyroxine      ID/ Antibiotics: Perioperative antibiotics vanc/zosyn x 3 days- completed  #Leukocytosis, persistent since OR 2/24, afebrile   - BCx 3/9 x2 NGTD, CDiff 3/10/2019 negative  - BC and swan tip cultured 3/4 for leukocytosis- NGTD.   - Empirically started on Vanc (3/9-3/11). Zosyn (3/9- ). Will discuss duration of zosyn with cards II.  - Valcyte for CMV prophylaxis; holding bactrim for PCP prophylaxis. Pentamidine given on 3/4.   - Nystatin for thrush     Heme:     #Bypass of persistent left SVC to R atrial appendage with Ringold-Davis graft. Will require lifelong anti-coagulation for graft  - Hgb goal >7. Daily CBC.  - Hgb 6.8 3/9, transfused 1 u pRBC, stable 3/10/2019, no s/s bleeding   - Low intensity heparin gtt    Prophylaxis:    - Mechanical prophylaxis for DVT  - Heparin as above  - Protonix qd     Lines/ tubes/ drains:  - RIJ HD line 3/8, LUE PICC 3/9, L pleural chest tubes x2      Disposition:  - CV ICU.     Patient seen, findings and plan discussed with CVTS fellow.    Leyla Bowling MD  General Surgery PGY-3  ====================================    SUBJECTIVE:   Less restless today  Having loose BMs  Tolerating TF advancement    OBJECTIVE:   1. VITAL SIGNS:   Temp:  [97  F (36.1  C)-97.6  F (36.4  C)] 97  F (36.1  C)  Pulse:   [94-96] 96  Heart Rate:  [92-98] 92  Resp:  [11-28] 26  BP: ()/(49-64) 105/64  SpO2:  [97 %-99 %] 98 %  Resp: 26      2. INTAKE/ OUTPUT:   I/O last 3 completed shifts:  In: 2248.96 [P.O.:530; I.V.:1253.96; NG/GT:230]  Out: 3316 [Other:2441; Stool:200; Chest Tube:675]    3. PHYSICAL EXAMINATION:     General: appears somewhat restless in bed, deconditioned  Neuro: Oriented to person and place, moves all four extremities spontaneously   Resp: NLB on RA  CV: Regular rate, regular rhythm, serous output from b/l pleural chest tubes  Right chest wall hematoma stable, minimal ecchymosis   Abdomen: Soft, mildly distended, non-tender  Incisions: c/d/i, no erythema or drainage  Extremities: warm and well perfused     4. INVESTIGATIONS:   Arterial Blood Gases   No lab results found in last 7 days.  Complete Blood Count   Recent Labs   Lab 03/12/19  1006 03/12/19 0323 03/11/19 2258 03/11/19  1652   WBC 12.9* 11.7* 13.2* 12.9*   HGB 7.5* 7.2* 7.2* 7.3*   PLT 89* 98* 111* 118*     Basic Metabolic Panel  Recent Labs   Lab 03/12/19  1006 03/12/19  0323 03/11/19 2258 03/11/19  1652    139 136 137   POTASSIUM 4.0 4.1 4.2 4.3   CHLORIDE 106 105 106 104   CO2 22 20 18* 18*   BUN 29 30 34* 34*   CR 1.57* 1.67* 1.72* 1.91*   * 116* 99 80     Liver Function Tests  Recent Labs   Lab 03/12/19  1006 03/12/19  0323 03/11/19 2258 03/11/19  1652  03/11/19  0338   AST 35 30 31 31   < > 29   ALT 23 22 22 21   < > 22   ALKPHOS 102 98 97 94   < > 102   BILITOTAL 0.9 0.9 0.9 0.9   < > 0.9   ALBUMIN 2.4* 2.5* 2.4* 2.6*   < > 2.8*   INR  --   --   --   --   --  1.50*    < > = values in this interval not displayed.     Pancreatic Enzymes  No lab results found in last 7 days.  Coagulation Profile  Recent Labs   Lab 03/11/19  0338   INR 1.50*         5. RADIOLOGY:   Recent Results (from the past 24 hour(s))   XR Chest Port 1 View   Result Value    Radiologist flags Small bilateral apical pneumothoraces (Urgent)    Narrative    EXAM:  XR CHEST PORT 1 VW  3/12/2019 6:30 AM     HISTORY:  evaluate chest tubes       COMPARISON:  3/11/2019    FINDINGS: AP radiograph of the chest. Midline sternotomy wires intact.  Enteric tube courses beyond the collimated margins of the film. Right  central venous catheter tip projects at the expected location of the  the low SVC. Left approach PICC line tip projects in the left SVC.  Unchanged positioning of bilateral chest tubes. Surgical staples  projecting over the left axilla. Epicardial pacer leads in stable  position.    The trachea is midline. Cardiomediastinal silhouette stable. Slightly  increased bibasilar streaky opacities. Small bilateral pneumothoraces.  No pleural effusions.      Impression    IMPRESSION:   1. Small bilateral apical pneumothoraces.  2. Stable support device positioning.  3. Slightly increased bibasilar streaky opacities, likely atelectasis.    [Urgent Result: Small bilateral apical pneumothoraces]    Finding was identified on 3/12/2019 9:02 AM.     Dr. Bowling was contacted by Dr. Ruiz at 3/12/2019 12:15 PM and  verbalized understanding of the urgent finding.     [id number]    I have personally reviewed the examination and initial interpretation  and I agree with the findings.    KISHA GUAMAN MD       =========================================

## 2019-03-12 NOTE — PLAN OF CARE
Neuro - Patient much more awake today and less restless. Intermittently confused about situation. Continues to have tremors. Up to chair x 3 and commode x 2.      Cardiac - Has external pacer wires. Keep HR> 90. 96 most of the day, 89 a few times . Continues on dobutamine at 2.5 mcg/hr. BP stable with map > 65.      Pulm - dyspnea on exertion. SOB after being in the chair x 1 hr and requests to get back to bed.      GI - large watery bm x3. TF at goal of 40. Patient ate 1/2 of breakfast, no lunch, and 1/4 of dinner.       - oliguric. CRRT orders for net 100/hr. Currently -1600 for the day. Patient did void while on the commode.      Skin - large bump noted under right chest incision. Multiple bruises.   Family updated to plan of care.

## 2019-03-12 NOTE — PROGRESS NOTES
[facility] ICU PROGRESS NOTE  March 12, 2019      CO-MORBIDITIES:   Chronic systolic heart failure (H)  (primary encounter diagnosis)  Acute on chronic systolic congestive heart failure (H)    ASSESSMENT: Everton Larios is a 53 year old male with PMH etoh abuse, hypothyroidism, intermittent asthma, ulcerative colitis, Depression, Chronic HFrEF, NICM admitted with cardiogenic shock requiring subclavian balloon pump now s/p OHT 2/24/19 with Piyush House and Christopher.      TODAY'S PROGRESS:   - Delirium appears to be improving; optimize sleep/wake cycle  - Touch base with cards II re antibiotic duration  - Switch miralax to PRN   - Remove pacing wires   - Plan to transition to iHD tomorrow    PLAN:  Neuro/ pain/ sedation:  - Monitor neurological status. Notify the MD for any acute changes in exam.  - Tylenol prn for pain. Hold narcotics for altered mental status.    #ICU delirium, slowly improving  - Head CT 3/9 negative  - Optimize sleep/wake cycle, melatonin for sleep, will schedule seroquel pm       #Depression  - PTA cymbalta 20mg     Pulmonary care:   - Doing well on room air  - Supp oxygen to maintain SpO2 >92%  - Encourage IS and deep breathing  - Bilateral pleural chest tubes to suction.     #Intermittent asthma  - PTA Montelukast ordered    Cardiovascular: HR goal >95. Echo 3/5 with good LV function, mild decreased RV function (moderately dilated).  - R heart cath and biopsy 3/4 and 3/11, biopsies negative for rejection.  - Monitor hemodynamic status.   - MAP >65. HR goal > 95.  - Dobutamine 2.5 mcg/kg/min, sildenafil 20 q8h, terbutaline 10 mg q8h  - Continue statin. No aspirin per cards.   - Pacing wires capped. Remove today.    GI care:   #Severe malnutrition  - Regular diet  - NJ tube feeds for nutritional supplementation  - Make bowel regimen PRN.      #Intraperitoneal free air (CT obtained 2/26)   - Small peritoneal defect made during redo operation, closed with stitches intra-op- likely source of free air.  Resolved.    #Pierce's esophagus  #Ulcerative colitis  -PTA Mesalamine     Fluids/ Electrolytes/ Nutrition:   - TKO for IV fluid hydration  - TF for enteral nutritional support as above.  - No indication for parenteral nutrition.  - Replace electrolytes as needed.  #Hyponatremia  - Asymptomatic/improved on CRRT    Renal/ Fluid Balance:   #FRANKLIN, oliguric.    - Etiology likely cardiorenal from venous congestion vs nephrotoxicity from multiple medications  - CRRT started (3/9), plan to transition to iHD 3/13  - Will continue to monitor intake and output.  - PTA Tamsulosin      Endocrine:    # Stress hyperglycemia  - Resolved. No ongoing BG monitoring.  # hypothyroidism  - Home levothyroxine      ID/ Antibiotics: Perioperative antibiotics vanc/zosyn x 3 days- completed  #Leukocytosis, persistent since OR 2/24, afebrile   - BCx 3/9 x2 NGTD, CDiff 3/10/2019 negative  - BC and swan tip cultured 3/4 for leukocytosis- NGTD.   - Empirically started on Vanc (3/9-3/11). Zosyn (3/9- ). Will discuss duration of zosyn with cards II.  - Valcyte for CMV prophylaxis; holding bactrim for PCP prophylaxis. Pentamidine given on 3/4.   - Nystatin for thrush     Heme:     #Bypass of persistent left SVC to R atrial appendage with [place] graft. Will require lifelong anti-coagulation for graft  - Hgb goal >7. Daily CBC.  - Hgb 6.8 3/9, transfused 1 u pRBC, stable 3/10/2019, no s/s bleeding   - Low intensity heparin gtt    Prophylaxis:    - Mechanical prophylaxis for DVT  - Heparin as above  - Protonix qd     Lines/ tubes/ drains:  - RIJ HD line 3/8, LUE PICC 3/9, L pleural chest tubes x2      Disposition:  - [facility] ICU.     Patient seen, findings and plan discussed with CV ICU staff, Dr. Sesay.    Leyla Bowling MD  General Surgery PGY-3  ====================================    SUBJECTIVE:   Less restless today  Having loose BMs  Tolerating TF advancement    OBJECTIVE:   1. VITAL SIGNS:   Temp:  [97  F (36.1  C)-97.6  F (36.4  C)] 97  F  (36.1  C)  Pulse:  [94-96] 96  Heart Rate:  [92-98] 92  Resp:  [11-28] 26  BP: ()/(49-64) 105/64  SpO2:  [97 %-99 %] 98 %  Resp: 26      2. INTAKE/ OUTPUT:   I/O last 3 completed shifts:  In: 2248.96 [P.O.:530; I.V.:1253.96; NG/GT:230]  Out: 3316 [Other:2441; Stool:200; Chest Tube:675]    3. PHYSICAL EXAMINATION:     General: appears somewhat restless in bed, deconditioned  Neuro: Oriented to person and place, moves all four extremities spontaneously   Resp: NLB on RA  CV: Regular rate, regular rhythm, serous output from b/l pleural chest tubes  Right chest wall hematoma stable, minimal ecchymosis   Abdomen: Soft, mildly distended, non-tender  Incisions: c/d/i, no erythema or drainage  Extremities: warm and well perfused     4. INVESTIGATIONS:   Arterial Blood Gases   No lab results found in last 7 days.  Complete Blood Count   Recent Labs   Lab 03/12/19  1006 03/12/19 0323 03/11/19 2258 03/11/19  1652   WBC 12.9* 11.7* 13.2* 12.9*   HGB 7.5* 7.2* 7.2* 7.3*   PLT 89* 98* 111* 118*     Basic Metabolic Panel  Recent Labs   Lab 03/12/19  1006 03/12/19  0323 03/11/19 2258 03/11/19  1652    139 136 137   POTASSIUM 4.0 4.1 4.2 4.3   CHLORIDE 106 105 106 104   CO2 22 20 18* 18*   BUN 29 30 34* 34*   CR 1.57* 1.67* 1.72* 1.91*   * 116* 99 80     Liver Function Tests  Recent Labs   Lab 03/12/19  1006 03/12/19  0323 03/11/19 2258 03/11/19  1652  03/11/19  0338   AST 35 30 31 31   < > 29   ALT 23 22 22 21   < > 22   ALKPHOS 102 98 97 94   < > 102   BILITOTAL 0.9 0.9 0.9 0.9   < > 0.9   ALBUMIN 2.4* 2.5* 2.4* 2.6*   < > 2.8*   INR  --   --   --   --   --  1.50*    < > = values in this interval not displayed.     Pancreatic Enzymes  No lab results found in last 7 days.  Coagulation Profile  Recent Labs   Lab 03/11/19  0338   INR 1.50*         5. RADIOLOGY:   Recent Results (from the past 24 hour(s))   XR Abdomen Port 1 View    Narrative    Exam: Abdominal x-ray, 1 view, 3/11/2018.    COMPARISON: CT exam  2/26/2018.    HISTORY: Feeding tube placement.    FINDINGS: Supine view of the abdomen was obtained. Nonobstructive  bowel gas pattern with scattered air-filled loops of bowel. No  pneumatosis or portal venous gas. Feeding tube with its tip projecting  over the third/fourth portion of the duodenum. Bilateral lower lobes  streaky opacities, left greater than right, atelectasis versus  consolidation.      Impression    IMPRESSION:  1. Feeding tube with its tip projecting over the third/fourth portion  of the duodenum.  2. Nonobstructive bowel gas pattern with scattered air-filled loops of  bowel.    DIAMANTE HENDERSON MD   XR Chest Port 1 View   Result Value    Radiologist flags Small bilateral apical pneumothoraces (Urgent)    Narrative    EXAM: XR CHEST PORT 1 VW  3/12/2019 6:30 AM     HISTORY:  evaluate chest tubes       COMPARISON:  3/11/2019    FINDINGS: AP radiograph of the chest. Midline sternotomy wires intact.  Enteric tube courses beyond the collimated margins of the film. Right  central venous catheter tip projects at the expected location of the  the low SVC. Left approach PICC line tip projects in the left SVC.  Unchanged positioning of bilateral chest tubes. Surgical staples  projecting over the left axilla. Epicardial pacer leads in stable  position.    The trachea is midline. Cardiomediastinal silhouette stable. Slightly  increased bibasilar streaky opacities. Small bilateral pneumothoraces.  No pleural effusions.      Impression    IMPRESSION:   1. Small bilateral apical pneumothoraces.  2. Stable support device positioning.  3. Slightly increased bibasilar streaky opacities, likely atelectasis.    [Urgent Result: Small bilateral apical pneumothoraces]    Finding was identified on 3/12/2019 9:02 AM.     Dr. Bowling was contacted by Dr. Ruiz at 3/12/2019 12:15 PM and  verbalized understanding of the urgent finding.     [id number]    I have personally reviewed the examination and initial  interpretation  and I agree with the findings.    KISHA GUAMAN MD       =========================================

## 2019-03-13 ENCOUNTER — APPOINTMENT (OUTPATIENT)
Dept: GENERAL RADIOLOGY | Facility: CLINIC | Age: 54
DRG: 001 | End: 2019-03-13
Attending: INTERNAL MEDICINE
Payer: COMMERCIAL

## 2019-03-13 ENCOUNTER — APPOINTMENT (OUTPATIENT)
Dept: PHYSICAL THERAPY | Facility: CLINIC | Age: 54
DRG: 001 | End: 2019-03-13
Attending: INTERNAL MEDICINE
Payer: COMMERCIAL

## 2019-03-13 ENCOUNTER — APPOINTMENT (OUTPATIENT)
Dept: OCCUPATIONAL THERAPY | Facility: CLINIC | Age: 54
DRG: 001 | End: 2019-03-13
Attending: INTERNAL MEDICINE
Payer: COMMERCIAL

## 2019-03-13 LAB
ALBUMIN SERPL-MCNC: 2.2 G/DL (ref 3.4–5)
ALBUMIN SERPL-MCNC: 2.4 G/DL (ref 3.4–5)
ALBUMIN SERPL-MCNC: 2.4 G/DL (ref 3.4–5)
ALP SERPL-CCNC: 102 U/L (ref 40–150)
ALP SERPL-CCNC: 105 U/L (ref 40–150)
ALP SERPL-CCNC: 98 U/L (ref 40–150)
ALT SERPL W P-5'-P-CCNC: 24 U/L (ref 0–70)
ALT SERPL W P-5'-P-CCNC: 24 U/L (ref 0–70)
ALT SERPL W P-5'-P-CCNC: 25 U/L (ref 0–70)
ANION GAP SERPL CALCULATED.3IONS-SCNC: 11 MMOL/L (ref 3–14)
ANION GAP SERPL CALCULATED.3IONS-SCNC: 12 MMOL/L (ref 3–14)
ANION GAP SERPL CALCULATED.3IONS-SCNC: 8 MMOL/L (ref 3–14)
AST SERPL W P-5'-P-CCNC: 28 U/L (ref 0–45)
AST SERPL W P-5'-P-CCNC: 28 U/L (ref 0–45)
AST SERPL W P-5'-P-CCNC: 30 U/L (ref 0–45)
BASOPHILS # BLD AUTO: 0 10E9/L (ref 0–0.2)
BASOPHILS NFR BLD AUTO: 0 %
BASOPHILS NFR BLD AUTO: 0.1 %
BASOPHILS NFR BLD AUTO: 0.1 %
BILIRUB SERPL-MCNC: 0.7 MG/DL (ref 0.2–1.3)
BILIRUB SERPL-MCNC: 0.8 MG/DL (ref 0.2–1.3)
BILIRUB SERPL-MCNC: 1.1 MG/DL (ref 0.2–1.3)
BLD PROD TYP BPU: NORMAL
BLD UNIT ID BPU: 0
BLOOD PRODUCT CODE: NORMAL
BPU ID: NORMAL
BUN SERPL-MCNC: 25 MG/DL (ref 7–30)
BUN SERPL-MCNC: 26 MG/DL (ref 7–30)
BUN SERPL-MCNC: 26 MG/DL (ref 7–30)
CA-I SERPL ISE-MCNC: 4.1 MG/DL (ref 4.4–5.2)
CA-I SERPL ISE-MCNC: 4.5 MG/DL (ref 4.4–5.2)
CA-I SERPL ISE-MCNC: 4.6 MG/DL (ref 4.4–5.2)
CALCIUM SERPL-MCNC: 7 MG/DL (ref 8.5–10.1)
CALCIUM SERPL-MCNC: 7.6 MG/DL (ref 8.5–10.1)
CALCIUM SERPL-MCNC: 8.1 MG/DL (ref 8.5–10.1)
CHLORIDE SERPL-SCNC: 106 MMOL/L (ref 94–109)
CO2 SERPL-SCNC: 20 MMOL/L (ref 20–32)
CO2 SERPL-SCNC: 21 MMOL/L (ref 20–32)
CO2 SERPL-SCNC: 24 MMOL/L (ref 20–32)
CREAT SERPL-MCNC: 1.19 MG/DL (ref 0.66–1.25)
CREAT SERPL-MCNC: 1.34 MG/DL (ref 0.66–1.25)
CREAT SERPL-MCNC: 1.36 MG/DL (ref 0.66–1.25)
DIFFERENTIAL METHOD BLD: ABNORMAL
EOSINOPHIL # BLD AUTO: 0 10E9/L (ref 0–0.7)
EOSINOPHIL NFR BLD AUTO: 0 %
EOSINOPHIL NFR BLD AUTO: 0 %
EOSINOPHIL NFR BLD AUTO: 0.1 %
ERYTHROCYTE [DISTWIDTH] IN BLOOD BY AUTOMATED COUNT: 16.5 % (ref 10–15)
ERYTHROCYTE [DISTWIDTH] IN BLOOD BY AUTOMATED COUNT: 16.8 % (ref 10–15)
ERYTHROCYTE [DISTWIDTH] IN BLOOD BY AUTOMATED COUNT: 16.8 % (ref 10–15)
ERYTHROCYTE [DISTWIDTH] IN BLOOD BY AUTOMATED COUNT: 17.1 % (ref 10–15)
GFR SERPL CREATININE-BSD FRML MDRD: 58 ML/MIN/{1.73_M2}
GFR SERPL CREATININE-BSD FRML MDRD: 59 ML/MIN/{1.73_M2}
GFR SERPL CREATININE-BSD FRML MDRD: 68 ML/MIN/{1.73_M2}
GLUCOSE SERPL-MCNC: 150 MG/DL (ref 70–99)
GLUCOSE SERPL-MCNC: 173 MG/DL (ref 70–99)
GLUCOSE SERPL-MCNC: 180 MG/DL (ref 70–99)
HCT VFR BLD AUTO: 20.4 % (ref 40–53)
HCT VFR BLD AUTO: 20.7 % (ref 40–53)
HCT VFR BLD AUTO: 22.2 % (ref 40–53)
HCT VFR BLD AUTO: 22.2 % (ref 40–53)
HGB BLD-MCNC: 6.3 G/DL (ref 13.3–17.7)
HGB BLD-MCNC: 6.5 G/DL (ref 13.3–17.7)
HGB BLD-MCNC: 7 G/DL (ref 13.3–17.7)
HGB BLD-MCNC: 7.1 G/DL (ref 13.3–17.7)
IMM GRANULOCYTES # BLD: 0.2 10E9/L (ref 0–0.4)
IMM GRANULOCYTES NFR BLD: 1 %
IMM GRANULOCYTES NFR BLD: 1.1 %
IMM GRANULOCYTES NFR BLD: 1.3 %
LMWH PPP CHRO-ACNC: 0.18 IU/ML
LMWH PPP CHRO-ACNC: 0.21 IU/ML
LYMPHOCYTES # BLD AUTO: 0.4 10E9/L (ref 0.8–5.3)
LYMPHOCYTES # BLD AUTO: 0.5 10E9/L (ref 0.8–5.3)
LYMPHOCYTES # BLD AUTO: 0.7 10E9/L (ref 0.8–5.3)
LYMPHOCYTES NFR BLD AUTO: 2.2 %
LYMPHOCYTES NFR BLD AUTO: 3.3 %
LYMPHOCYTES NFR BLD AUTO: 3.9 %
MAGNESIUM SERPL-MCNC: 2.4 MG/DL (ref 1.6–2.3)
MCH RBC QN AUTO: 30 PG (ref 26.5–33)
MCH RBC QN AUTO: 30.4 PG (ref 26.5–33)
MCH RBC QN AUTO: 30.4 PG (ref 26.5–33)
MCH RBC QN AUTO: 30.5 PG (ref 26.5–33)
MCHC RBC AUTO-ENTMCNC: 30.9 G/DL (ref 31.5–36.5)
MCHC RBC AUTO-ENTMCNC: 31.4 G/DL (ref 31.5–36.5)
MCHC RBC AUTO-ENTMCNC: 31.5 G/DL (ref 31.5–36.5)
MCHC RBC AUTO-ENTMCNC: 32 G/DL (ref 31.5–36.5)
MCV RBC AUTO: 95 FL (ref 78–100)
MCV RBC AUTO: 97 FL (ref 78–100)
MONOCYTES # BLD AUTO: 0.4 10E9/L (ref 0–1.3)
MONOCYTES # BLD AUTO: 0.5 10E9/L (ref 0–1.3)
MONOCYTES # BLD AUTO: 0.6 10E9/L (ref 0–1.3)
MONOCYTES NFR BLD AUTO: 2.3 %
MONOCYTES NFR BLD AUTO: 3.1 %
MONOCYTES NFR BLD AUTO: 3.5 %
NEUTROPHILS # BLD AUTO: 13.8 10E9/L (ref 1.6–8.3)
NEUTROPHILS # BLD AUTO: 16.1 10E9/L (ref 1.6–8.3)
NEUTROPHILS # BLD AUTO: 16.4 10E9/L (ref 1.6–8.3)
NEUTROPHILS NFR BLD AUTO: 91.1 %
NEUTROPHILS NFR BLD AUTO: 92.6 %
NEUTROPHILS NFR BLD AUTO: 94.3 %
NRBC # BLD AUTO: 0.2 10*3/UL
NRBC # BLD AUTO: 0.4 10*3/UL
NRBC # BLD AUTO: 0.5 10*3/UL
NRBC BLD AUTO-RTO: 1 /100
NRBC BLD AUTO-RTO: 3 /100
NRBC BLD AUTO-RTO: 3 /100
PHOSPHATE SERPL-MCNC: 2.2 MG/DL (ref 2.5–4.5)
PHOSPHATE SERPL-MCNC: 2.3 MG/DL (ref 2.5–4.5)
PHOSPHATE SERPL-MCNC: 2.7 MG/DL (ref 2.5–4.5)
PLATELET # BLD AUTO: 47 10E9/L (ref 150–450)
PLATELET # BLD AUTO: 53 10E9/L (ref 150–450)
PLATELET # BLD AUTO: 68 10E9/L (ref 150–450)
PLATELET # BLD AUTO: 71 10E9/L (ref 150–450)
PLATELET # BLD EST: ABNORMAL 10*3/UL
POTASSIUM SERPL-SCNC: 3.8 MMOL/L (ref 3.4–5.3)
POTASSIUM SERPL-SCNC: 3.9 MMOL/L (ref 3.4–5.3)
POTASSIUM SERPL-SCNC: 4.4 MMOL/L (ref 3.4–5.3)
PROT SERPL-MCNC: 4.5 G/DL (ref 6.8–8.8)
PROT SERPL-MCNC: 4.7 G/DL (ref 6.8–8.8)
PROT SERPL-MCNC: 4.8 G/DL (ref 6.8–8.8)
RBC # BLD AUTO: 2.1 10E12/L (ref 4.4–5.9)
RBC # BLD AUTO: 2.14 10E12/L (ref 4.4–5.9)
RBC # BLD AUTO: 2.3 10E12/L (ref 4.4–5.9)
RBC # BLD AUTO: 2.33 10E12/L (ref 4.4–5.9)
SODIUM SERPL-SCNC: 137 MMOL/L (ref 133–144)
SODIUM SERPL-SCNC: 138 MMOL/L (ref 133–144)
SODIUM SERPL-SCNC: 139 MMOL/L (ref 133–144)
TACROLIMUS BLD-MCNC: 7 UG/L (ref 5–15)
TME LAST DOSE: NORMAL H
TRANSFUSION STATUS PATIENT QL: NORMAL
TRANSFUSION STATUS PATIENT QL: NORMAL
WBC # BLD AUTO: 14.9 10E9/L (ref 4–11)
WBC # BLD AUTO: 17.1 10E9/L (ref 4–11)
WBC # BLD AUTO: 17.8 10E9/L (ref 4–11)
WBC # BLD AUTO: 18 10E9/L (ref 4–11)

## 2019-03-13 PROCEDURE — 85025 COMPLETE CBC W/AUTO DIFF WBC: CPT | Performed by: GENERAL ACUTE CARE HOSPITAL

## 2019-03-13 PROCEDURE — 25000132 ZZH RX MED GY IP 250 OP 250 PS 637: Performed by: INTERNAL MEDICINE

## 2019-03-13 PROCEDURE — 71045 X-RAY EXAM CHEST 1 VIEW: CPT

## 2019-03-13 PROCEDURE — 83735 ASSAY OF MAGNESIUM: CPT | Performed by: GENERAL ACUTE CARE HOSPITAL

## 2019-03-13 PROCEDURE — 20000004 ZZH R&B ICU UMMC

## 2019-03-13 PROCEDURE — 86923 COMPATIBILITY TEST ELECTRIC: CPT | Performed by: THORACIC SURGERY (CARDIOTHORACIC VASCULAR SURGERY)

## 2019-03-13 PROCEDURE — 80053 COMPREHEN METABOLIC PANEL: CPT | Performed by: GENERAL ACUTE CARE HOSPITAL

## 2019-03-13 PROCEDURE — 25800030 ZZH RX IP 258 OP 636: Performed by: GENERAL ACUTE CARE HOSPITAL

## 2019-03-13 PROCEDURE — 84100 ASSAY OF PHOSPHORUS: CPT | Performed by: GENERAL ACUTE CARE HOSPITAL

## 2019-03-13 PROCEDURE — 80053 COMPREHEN METABOLIC PANEL: CPT | Performed by: STUDENT IN AN ORGANIZED HEALTH CARE EDUCATION/TRAINING PROGRAM

## 2019-03-13 PROCEDURE — 85520 HEPARIN ASSAY: CPT | Performed by: STUDENT IN AN ORGANIZED HEALTH CARE EDUCATION/TRAINING PROGRAM

## 2019-03-13 PROCEDURE — 25000132 ZZH RX MED GY IP 250 OP 250 PS 637: Performed by: STUDENT IN AN ORGANIZED HEALTH CARE EDUCATION/TRAINING PROGRAM

## 2019-03-13 PROCEDURE — 25000125 ZZHC RX 250: Performed by: INTERNAL MEDICINE

## 2019-03-13 PROCEDURE — 97530 THERAPEUTIC ACTIVITIES: CPT | Mod: GP

## 2019-03-13 PROCEDURE — 82330 ASSAY OF CALCIUM: CPT | Performed by: STUDENT IN AN ORGANIZED HEALTH CARE EDUCATION/TRAINING PROGRAM

## 2019-03-13 PROCEDURE — 86901 BLOOD TYPING SEROLOGIC RH(D): CPT | Performed by: THORACIC SURGERY (CARDIOTHORACIC VASCULAR SURGERY)

## 2019-03-13 PROCEDURE — 27210429 ZZH NUTRITION PRODUCT INTERMEDIATE LITER

## 2019-03-13 PROCEDURE — 80197 ASSAY OF TACROLIMUS: CPT | Performed by: INTERNAL MEDICINE

## 2019-03-13 PROCEDURE — 85027 COMPLETE CBC AUTOMATED: CPT | Performed by: INTERNAL MEDICINE

## 2019-03-13 PROCEDURE — 25000131 ZZH RX MED GY IP 250 OP 636 PS 637: Performed by: INTERNAL MEDICINE

## 2019-03-13 PROCEDURE — 85520 HEPARIN ASSAY: CPT | Performed by: INTERNAL MEDICINE

## 2019-03-13 PROCEDURE — 85025 COMPLETE CBC W/AUTO DIFF WBC: CPT | Performed by: STUDENT IN AN ORGANIZED HEALTH CARE EDUCATION/TRAINING PROGRAM

## 2019-03-13 PROCEDURE — 82330 ASSAY OF CALCIUM: CPT | Performed by: GENERAL ACUTE CARE HOSPITAL

## 2019-03-13 PROCEDURE — 25000128 H RX IP 250 OP 636: Performed by: INTERNAL MEDICINE

## 2019-03-13 PROCEDURE — 99233 SBSQ HOSP IP/OBS HIGH 50: CPT | Mod: GC | Performed by: INTERNAL MEDICINE

## 2019-03-13 PROCEDURE — 25000132 ZZH RX MED GY IP 250 OP 250 PS 637: Performed by: SURGERY

## 2019-03-13 PROCEDURE — 86900 BLOOD TYPING SEROLOGIC ABO: CPT | Performed by: THORACIC SURGERY (CARDIOTHORACIC VASCULAR SURGERY)

## 2019-03-13 PROCEDURE — P9016 RBC LEUKOCYTES REDUCED: HCPCS | Performed by: THORACIC SURGERY (CARDIOTHORACIC VASCULAR SURGERY)

## 2019-03-13 PROCEDURE — 87040 BLOOD CULTURE FOR BACTERIA: CPT | Performed by: INTERNAL MEDICINE

## 2019-03-13 PROCEDURE — 97110 THERAPEUTIC EXERCISES: CPT | Mod: GO | Performed by: OCCUPATIONAL THERAPIST

## 2019-03-13 PROCEDURE — 90947 DIALYSIS REPEATED EVAL: CPT

## 2019-03-13 PROCEDURE — 25000125 ZZHC RX 250: Performed by: GENERAL ACUTE CARE HOSPITAL

## 2019-03-13 PROCEDURE — 84100 ASSAY OF PHOSPHORUS: CPT | Performed by: STUDENT IN AN ORGANIZED HEALTH CARE EDUCATION/TRAINING PROGRAM

## 2019-03-13 PROCEDURE — 86850 RBC ANTIBODY SCREEN: CPT | Performed by: THORACIC SURGERY (CARDIOTHORACIC VASCULAR SURGERY)

## 2019-03-13 PROCEDURE — 97535 SELF CARE MNGMENT TRAINING: CPT | Mod: GO | Performed by: OCCUPATIONAL THERAPIST

## 2019-03-13 PROCEDURE — 25800030 ZZH RX IP 258 OP 636: Performed by: INTERNAL MEDICINE

## 2019-03-13 PROCEDURE — 83735 ASSAY OF MAGNESIUM: CPT | Performed by: STUDENT IN AN ORGANIZED HEALTH CARE EDUCATION/TRAINING PROGRAM

## 2019-03-13 PROCEDURE — 36415 COLL VENOUS BLD VENIPUNCTURE: CPT | Performed by: INTERNAL MEDICINE

## 2019-03-13 RX ORDER — PIPERACILLIN SODIUM, TAZOBACTAM SODIUM 2; .25 G/10ML; G/10ML
2.25 INJECTION, POWDER, LYOPHILIZED, FOR SOLUTION INTRAVENOUS EVERY 6 HOURS
Status: DISCONTINUED | OUTPATIENT
Start: 2019-03-13 | End: 2019-03-14

## 2019-03-13 RX ORDER — VALGANCICLOVIR HYDROCHLORIDE 50 MG/ML
450 POWDER, FOR SOLUTION ORAL
Status: DISCONTINUED | OUTPATIENT
Start: 2019-03-16 | End: 2019-03-14

## 2019-03-13 RX ORDER — TACROLIMUS 1 MG/1
1 CAPSULE ORAL
Status: DISCONTINUED | OUTPATIENT
Start: 2019-03-13 | End: 2019-03-14

## 2019-03-13 RX ORDER — GUAR GUM
1 PACKET (EA) ORAL EVERY 6 HOURS
Status: DISCONTINUED | OUTPATIENT
Start: 2019-03-13 | End: 2019-03-22

## 2019-03-13 RX ADMIN — CALCIUM CHLORIDE, MAGNESIUM CHLORIDE, SODIUM CHLORIDE, SODIUM BICARBONATE, POTASSIUM CHLORIDE AND SODIUM PHOSPHATE DIBASIC DIHYDRATE 12.5 ML/KG/HR: 3.68; 3.05; 6.34; 3.09; .314; .187 INJECTION INTRAVENOUS at 12:59

## 2019-03-13 RX ADMIN — MELATONIN TAB 3 MG 3 MG: 3 TAB at 19:51

## 2019-03-13 RX ADMIN — Medication 0.5 MG: at 19:56

## 2019-03-13 RX ADMIN — HEPARIN SODIUM 900 UNITS/HR: 10000 INJECTION, SOLUTION INTRAVENOUS at 22:09

## 2019-03-13 RX ADMIN — Medication 1 PACKET: at 23:49

## 2019-03-13 RX ADMIN — TACROLIMUS 0.5 MG: 0.5 CAPSULE ORAL at 07:36

## 2019-03-13 RX ADMIN — PREDNISONE 15 MG: 10 TABLET ORAL at 18:02

## 2019-03-13 RX ADMIN — MESALAMINE 2400 MG: 800 TABLET, DELAYED RELEASE ORAL at 19:56

## 2019-03-13 RX ADMIN — PIPERACILLIN AND TAZOBACTAM 4.5 G: 4; .5 INJECTION, POWDER, FOR SOLUTION INTRAVENOUS at 14:07

## 2019-03-13 RX ADMIN — SILDENAFIL 20 MG: 20 TABLET ORAL at 14:07

## 2019-03-13 RX ADMIN — Medication 1 PACKET: at 16:27

## 2019-03-13 RX ADMIN — SILDENAFIL 20 MG: 20 TABLET ORAL at 19:51

## 2019-03-13 RX ADMIN — PIPERACILLIN AND TAZOBACTAM 4.5 G: 4; .5 INJECTION, POWDER, FOR SOLUTION INTRAVENOUS at 01:51

## 2019-03-13 RX ADMIN — TERBUTALINE SULFATE 10 MG: 5 TABLET ORAL at 07:39

## 2019-03-13 RX ADMIN — TERBUTALINE SULFATE 10 MG: 5 TABLET ORAL at 16:27

## 2019-03-13 RX ADMIN — PIPERACILLIN SODIUM AND TAZOBACTAM SODIUM 2.25 G: 2; .25 INJECTION, POWDER, LYOPHILIZED, FOR SOLUTION INTRAVENOUS at 19:51

## 2019-03-13 RX ADMIN — SILDENAFIL 20 MG: 20 TABLET ORAL at 07:36

## 2019-03-13 RX ADMIN — PREDNISONE 15 MG: 10 TABLET ORAL at 07:36

## 2019-03-13 RX ADMIN — FLUTICASONE PROPIONATE 2 PUFF: 220 AEROSOL, METERED RESPIRATORY (INHALATION) at 19:56

## 2019-03-13 RX ADMIN — MYCOPHENOLATE MOFETIL 1000 MG: 200 POWDER, FOR SUSPENSION ORAL at 07:39

## 2019-03-13 RX ADMIN — MONTELUKAST SODIUM 10 MG: 10 TABLET, COATED ORAL at 22:06

## 2019-03-13 RX ADMIN — Medication 500000 UNITS: at 12:56

## 2019-03-13 RX ADMIN — Medication 1 PACKET: at 12:56

## 2019-03-13 RX ADMIN — RANITIDINE 150 MG: 150 TABLET ORAL at 07:40

## 2019-03-13 RX ADMIN — PIPERACILLIN AND TAZOBACTAM 4.5 G: 4; .5 INJECTION, POWDER, FOR SOLUTION INTRAVENOUS at 07:37

## 2019-03-13 RX ADMIN — TERBUTALINE SULFATE 10 MG: 5 TABLET ORAL at 23:49

## 2019-03-13 RX ADMIN — CALCIUM CHLORIDE, MAGNESIUM CHLORIDE, SODIUM CHLORIDE, SODIUM BICARBONATE, POTASSIUM CHLORIDE AND SODIUM PHOSPHATE DIBASIC DIHYDRATE 12.5 ML/KG/HR: 3.68; 3.05; 6.34; 3.09; .314; .187 INJECTION INTRAVENOUS at 05:50

## 2019-03-13 RX ADMIN — CALCIUM CHLORIDE 1 G: 100 INJECTION, SOLUTION INTRAVENOUS at 00:35

## 2019-03-13 RX ADMIN — Medication 500000 UNITS: at 16:27

## 2019-03-13 RX ADMIN — MESALAMINE 2400 MG: 800 TABLET, DELAYED RELEASE ORAL at 07:40

## 2019-03-13 RX ADMIN — DOBUTAMINE 2.5 MCG/KG/MIN: 12.5 INJECTION, SOLUTION INTRAVENOUS at 22:06

## 2019-03-13 RX ADMIN — ACETAMINOPHEN 650 MG: 325 TABLET, FILM COATED ORAL at 03:53

## 2019-03-13 RX ADMIN — ACETAMINOPHEN 650 MG: 325 TABLET, FILM COATED ORAL at 19:52

## 2019-03-13 RX ADMIN — DULOXETINE 20 MG: 20 CAPSULE, DELAYED RELEASE ORAL at 07:36

## 2019-03-13 RX ADMIN — Medication 5 ML: at 07:39

## 2019-03-13 RX ADMIN — LEVOTHYROXINE SODIUM 100 MCG: 100 TABLET ORAL at 07:36

## 2019-03-13 RX ADMIN — MYCOPHENOLATE MOFETIL 1000 MG: 200 POWDER, FOR SUSPENSION ORAL at 19:53

## 2019-03-13 RX ADMIN — Medication 0.5 MG: at 18:00

## 2019-03-13 RX ADMIN — CALCIUM CHLORIDE, MAGNESIUM CHLORIDE, SODIUM CHLORIDE, SODIUM BICARBONATE, POTASSIUM CHLORIDE AND SODIUM PHOSPHATE DIBASIC DIHYDRATE 12.5 ML/KG/HR: 3.68; 3.05; 6.34; 3.09; .314; .187 INJECTION INTRAVENOUS at 05:51

## 2019-03-13 RX ADMIN — Medication 500000 UNITS: at 07:39

## 2019-03-13 RX ADMIN — ROSUVASTATIN CALCIUM 10 MG: 10 TABLET, FILM COATED ORAL at 07:36

## 2019-03-13 RX ADMIN — FLUTICASONE PROPIONATE 2 PUFF: 220 AEROSOL, METERED RESPIRATORY (INHALATION) at 07:39

## 2019-03-13 RX ADMIN — Medication 500000 UNITS: at 19:55

## 2019-03-13 ASSESSMENT — ACTIVITIES OF DAILY LIVING (ADL)
ADLS_ACUITY_SCORE: 15

## 2019-03-13 ASSESSMENT — MIFFLIN-ST. JEOR: SCORE: 1384.5

## 2019-03-13 ASSESSMENT — PAIN DESCRIPTION - DESCRIPTORS
DESCRIPTORS: ACHING;DISCOMFORT
DESCRIPTORS: DISCOMFORT;SORE

## 2019-03-13 NOTE — PROGRESS NOTES
ADVANCED HEART FAILURE PROGRESS NOTE    SUBJECTIVE:  No acute events. Mental status continues to wax and wane.     ROS otherwise negative.    OBJECTIVE:  Vital signs:  Temp: 97.4  F (36.3  C) Temp  Min: 96.6  F (35.9  C)  Max: 99  F (37.2  C)  Heart Rate: 95 Heart Rate  Min: 89  Max: 98  BP: 100/53 Systolic (24hrs), Av , Min:82 , Max:110   Diastolic (24hrs), Av, Min:48, Max:93    Resp: 17 Resp  Min: 12  Max: 28  SpO2: 100 % SpO2  Min: 97 %  Max: 100 %      Intake/Output Summary (Last 24 hours) at 3/13/2019 0706  Last data filed at 3/13/2019 0700  Gross per 24 hour   Intake 3491.4 ml   Output 5536 ml   Net -2044.6 ml       Vitals:    19 0000 19 0400 19 0400   Weight: 62 kg (136 lb 11 oz) 59.8 kg (131 lb 13.4 oz) 57.5 kg (126 lb 12.2 oz)       Physical Exam:  GEN:  no distress, mildly confused but alert and appropriate  CV:  Regular rate and rhythm, no murmur.   CHEST: Subclavian incision sutured.  Chest tubes.  LUNGS:  On room air, normal work of breathing, Clear to auscultation bilaterally, no wheezes or crackles.   ABD:  Active bowel sounds, soft, non-tender/non-distended.  No rebound/guarding/rigidity.  EXT:  No edema or cyanosis. Normal distal puses  NEURO: no focal deficits      Medications:    IV fluid REPLACEMENT ONLY       CRRT replacement solution 12.5 mL/kg/hr (19 0551)     DOBUTamine 2.5 mcg/kg/min (19)     HEParin 900 Units/hr (19)     - MEDICATION INSTRUCTIONS -       - MEDICATION INSTRUCTIONS -       - MEDICATION INSTRUCTIONS -       CRRT replacement solution 3.656 mL/kg/hr (19 707)     CRRT replacement solution 12.5 mL/kg/hr (19 0550)     BETA BLOCKER NOT PRESCRIBED         Current Facility-Administered Medications   Medication Dose Route Frequency     B and C vitamin Complex with folic acid  5 mL Per Feeding Tube Daily     DULoxetine  20 mg Oral Daily     fluticasone  2 puff Inhalation Q12H     heparin lock flush  5 mL Intravenous Q24H      heparin lock flush  5-10 mL Intracatheter Q24H     levothyroxine  100 mcg Oral Daily     melatonin  3 mg Oral At Bedtime     mesalamine  2,400 mg Oral BID     montelukast  10 mg Oral At Bedtime     mycophenolate  1,000 mg Oral or Feeding Tube BID    And     mycophenolate  1,000 mg Oral BID     nystatin  500,000 Units Oral 4x Daily     piperacillin-tazobactam  4.5 g Intravenous Q6H     predniSONE  15 mg Oral BID w/meals     ranitidine  150 mg Oral Daily     rosuvastatin  10 mg Oral Daily     senna-docusate  1 tablet Oral BID    Or     senna-docusate  2 tablet Oral BID     sildenafil  20 mg Oral TID     sodium chloride (PF)  3 mL Intracatheter Q8H     tacrolimus  0.5 mg Oral BID IS    Or     tacrolimus  0.5 mg Per Feeding Tube BID IS     terbutaline  10 mg Oral Q8H     valGANciclovir  450 mg Oral or Feeding Tube Every Other Day         Labs:  CMP  Recent Labs   Lab 03/13/19  0334 03/12/19  2130  03/10/19  0410  03/09/19  1148    138   < > 133   < >  --    POTASSIUM 3.8 4.0   < > 4.6   < >  --    CHLORIDE 106 106   < > 102   < >  --    CO2 20 22   < > 18*   < >  --    BUN 26 25   < > 58*   < >  --    CR 1.36* 1.40*   < > 2.61*   < >  --    RADAMES 8.1* 7.6*   < > 8.0*   < >  --    MAG 2.4* 2.4*   < > 2.7*   < >  --    PHOS 2.7 3.1   < > 5.8*   < >  --    * 180*   < > 88   < >  --    LDH  --   --   --  276*  --  266*   ALKPHOS 102 104   < > 104   < >  --    BILITOTAL 0.8 0.8   < > 0.8   < >  --    AST 30 36   < > 22   < >  --    ALT 24 24   < > 19   < >  --     < > = values in this interval not displayed.     BLOOD GASNo lab results found in last 7 days.  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 Post-op complicated  by RV dysfunction and FRANKLIN.     Updates today:  - WBC up to 18, will repeat cultures  - tacro level down, will increase dose to 1mg BID     #Oliguric renal failure:  Etiology cardiorenal from venous congestion vs. nephrotoxins.  - now on CRRT, pulling fluid as tolerated  - nephrology consulted, appreciate assistance  - may transition to intermittent HD tomorrow     #Sepsis  - empiric coverage with vancomycin (3/9 - 3/11 ), zosyn (3/9 - plan 7 day course)  - cultures no growth to date  - US of right chest wall swelling shows stable hematoma, no overt infection     #S/p OHT on 2/24:  Graft function:  - echo 3/1 shows normal LV function, RV is mild to moderately dilated and hypokinetic  - dobutamine at 2.5 for continued RV and hemodynamic support  - continue sildenafil 20 q8h  - off isuprel, continue terbutaline 10 q8h     Immunosuppression:  - on MMF decreased to 1000 BID given possible sepsis  - s/p methylpred 125 x 3 doses, prednisone down to 15mg BID, decreasing by 5mg daily with negative biopsies  - NOT Simulect protocol, tacro increased to 1 bid, monitoring level (goal 10-12)     Prophylaxis:  - CMV +/+: medium risk, renal dose Valcyte  - PCP: holding Bactrim for now, pentamadine given 3/3  - Thrush: start nystatin  - GI: on PPI  - CAV: start Crestor, holding ASA given bleeding at line sites     Biopsies:  - First biopsy 3/4, 0R, no AMR  - Second biopsy 3/11, 0R, no AMR  - Third biopsy due 3/18     Goretex conduit for persistent left SVC:  - will need anticoagulation, on low intensity heparin  - NO CENTRAL VENOUS CATHETERS ON THE LEFT UPPER BODY        Seen and staffed with Dr. Galan.        Trev Harris MD  Advanced Heart Failure Fellow  [phone]      Trev Harris MD  Advanced Heart Failure Fellow  [phone]

## 2019-03-13 NOTE — PROGRESS NOTES
"CRRT STATUS NOTE    DATA:  Time:  4:39 AM  Pressures WNL:  Yes  Filter Status:  WDL    Problems Reported/Alarms Noted:  No alarms reported or noted.    Supplies Present: Yes    ASSESSMENT:  Patient Net Fluid Balance:  Pt was net negative 2liters for the last 24hrs and for the last 8hrs pt net negative 0.4liters.  Vital Signs:  /68 (BP Location: Right arm)   Pulse 96   Temp 97.4  F (36.3  C) (Axillary)   Resp 16   Ht 1.727 m (5' 8\")   Wt 57.5 kg (126 lb 12.2 oz)   SpO2 100%   BMI 19.27 kg/m    Labs:  Labs reviewed, of note  Hgb 7.1 stable.   Goals of Therapy:  Current goals of therapy are net negative 0-50mls/hr as BP tolerates.  Currently pt able to attain this goal.    INTERVENTIONS:   No interventions required this shift.    PLAN:  Continue current POC.  Check circuit qshift and PRN.  Change circuit q72hrs or PRN.  For questions or concerns please contact CRRT RN [phone].    "

## 2019-03-13 NOTE — PROGRESS NOTES
CVTS PROGRESS NOTE  March 13, 2019      CO-MORBIDITIES:   Chronic systolic heart failure (H)  (primary encounter diagnosis)  Acute on chronic systolic congestive heart failure (H)    ASSESSMENT: Everton Larios is a 53 year old male with PMH etoh abuse, hypothyroidism, intermittent asthma, ulcerative colitis, Depression, Chronic HFrEF, NICM admitted with cardiogenic shock requiring subclavian balloon pump now s/p OHT 2/24/19 with Piyush House and Christopher.      TODAY'S PROGRESS:   Discuss narrowing Zosyn with cardiology given thrombocytopenia   Continue CRRT until machine clots, then will transition to iHD (likely tmrw)  Temporary pacing wires removed; chest tubes  to eval output\  Add fiber given ongoing diarrhea    PLAN:  Neuro/ pain/ sedation:  - Monitor neurological status. Notify the MD for any acute changes in exam.  - Tylenol prn for pain. Hold narcotics for altered mental status.    #ICU delirium, slowly improving  - Head CT 3/9 negative  - Optimize sleep/wake cycle, melatonin for sleep      #Depression  - PTA cymbalta 20mg     Pulmonary care:   - Doing well on room air  - Supp oxygen to maintain SpO2 >92%  - Encourage IS and deep breathing  - Bilateral pleural chest tubes to suction.  to eval output.    #Intermittent asthma  - PTA Montelukast ordered    Cardiovascular: HR goal >95. Echo 3/5 with good LV function, mild decreased RV function (moderately dilated).  - R heart cath and biopsy 3/4 and 3/11, biopsies negative for rejection.  - Monitor hemodynamic status.   - MAP >65. HR goal > 95.  - Dobutamine 2.5 mcg/kg/min, sildenafil 20 q8h, terbutaline 10 mg q8h  - Continue statin. No aspirin per cards.   - Pacing wires removed 3/13.     GI care:   #Severe malnutrition  - Regular diet  - NJ tube feeds for nutritional supplementation  - Bowel regimen PRN.   - Fiber given diarrhea.    #Intraperitoneal free air (CT obtained 2/26)   - Small peritoneal defect made during redo operation, closed  with stitches intra-op- likely source of free air. Resolved.    #Pierce's esophagus  #Ulcerative colitis  -PTA Mesalamine     Fluids/ Electrolytes/ Nutrition:   - TKO for IV fluid hydration  - TF for enteral nutritional support as above.  - No indication for parenteral nutrition.  - Replace electrolytes as needed.  #Hyponatremia  - Asymptomatic/improved on CRRT    Renal/ Fluid Balance:   #FRANKLIN, oliguric.    - Etiology likely cardiorenal from venous congestion vs nephrotoxicity from multiple medications  - CRRT started (3/9), await machine to clot, plan to transition to iHD 3/14  - Will continue to monitor intake and output.  - PTA Tamsulosin      Endocrine:    # Stress hyperglycemia  - Resolved. No ongoing BG monitoring.  # hypothyroidism  - Home levothyroxine      ID/ Antibiotics: Perioperative antibiotics vanc/zosyn x 3 days- completed  #Leukocytosis, persistent since OR 2/24, afebrile   - BCx 3/9 x2 NGTD, CDiff 3/10/2019 negative  - BC and swan tip cultured 3/4 for leukocytosis- NGTD.   - Empirically started on Vanc (3/9-3/11). Zosyn (3/9- ) until 3/16. Will discuss narrowing to ancef given zosyn could contribute to thrombocytopenia.  - Valcyte for CMV prophylaxis; holding bactrim for PCP prophylaxis. Pentamidine given on 3/4.   - Nystatin for thrush     Heme:     #Bypass of persistent left SVC to R atrial appendage with [place] graft. Will require lifelong anti-coagulation for graft  - Hgb goal >7. Daily CBC.  - Hgb 6.8 3/9, transfused 1 u pRBC, stable 3/10/2019, no s/s bleeding   - Low intensity heparin gtt    Prophylaxis:    - Mechanical prophylaxis for DVT  - Heparin as above  - Protonix qd     Lines/ tubes/ drains:  - RIJ HD line 3/8, LUE PICC 3/9, L pleural chest tubes x2      Disposition:  - CV ICU.     Patient seen, findings and plan discussed with CVTS fellow.    Leyla Bowling MD  General Surgery PGY-3  ====================================    SUBJECTIVE:   Mental status slowly improving, sleeping more  at night  Tolerating fluid removal on CRRT -2L yesterday  Diarrhea worsening and some pills in his stools    OBJECTIVE:   1. VITAL SIGNS:   Temp:  [96.6  F (35.9  C)-99  F (37.2  C)] 98.4  F (36.9  C)  Heart Rate:  [89-98] 96  Resp:  [12-26] 21  BP: ()/(48-93) 106/60  SpO2:  [98 %-100 %] 99 %  Resp: 21      2. INTAKE/ OUTPUT:   I/O last 3 completed shifts:  In: 3461.4 [P.O.:1210; I.V.:891.4; NG/GT:360]  Out: 5576 [Urine:95; Other:4161; Stool:900; Chest Tube:420]    3. PHYSICAL EXAMINATION:     General: resting comfortably in bed, deconditioned  Neuro: Oriented to person and place, moves all four extremities spontaneously   Resp: NLB on RA  CV: Regular rate, regular rhythm, serous output from b/l pleural chest tubes  Right chest wall hematoma stable, minimal ecchymosis   Abdomen: Soft, mildly distended, non-tender  Incisions: c/d/i, no erythema or drainage  Extremities: warm and well perfused     4. INVESTIGATIONS:   Arterial Blood Gases   No lab results found in last 7 days.  Complete Blood Count   Recent Labs   Lab 03/13/19  1000 03/13/19  0334 03/12/19 2130 03/12/19  1548   WBC 18.0* 14.9* 14.8* 14.5*   HGB 7.0* 7.1* 7.1* 7.3*   PLT 68* 71* 79* 89*     Basic Metabolic Panel  Recent Labs   Lab 03/13/19  1000 03/13/19  0334 03/12/19 2130 03/12/19  1548    137 138 138   POTASSIUM 4.4 3.8 4.0 4.3   CHLORIDE 106 106 106 105   CO2 21 20 22 20   BUN 26 26 25 26   CR 1.34* 1.36* 1.40* 1.46*   * 180* 180* 124*     Liver Function Tests  Recent Labs   Lab 03/13/19  1000 03/13/19  0334 03/12/19 2130 03/12/19  1548  03/11/19  0338   AST PENDING 30 36 38   < > 29   ALT PENDING 24 24 25   < > 22   ALKPHOS PENDING 102 104 107   < > 102   BILITOTAL PENDING 0.8 0.8 0.9   < > 0.9   ALBUMIN PENDING 2.4* 2.5* 2.6*   < > 2.8*   INR  --   --   --   --   --  1.50*    < > = values in this interval not displayed.     Pancreatic Enzymes  No lab results found in last 7 days.  Coagulation Profile  Recent Labs   Lab  03/11/19  0338   INR 1.50*         5. RADIOLOGY:   Recent Results (from the past 24 hour(s))   XR Chest Port 1 View    Narrative    Exam: XR CHEST PORT 1 VW, 3/13/2019 5:30 AM    Indication: evaluate chest tubes    Comparison: 3/12/2019    Findings:   Single portable view of the chest. Right-sided IJ central venous  catheter terminating in the low SVC. Enteric tube projecting over the  esophagus and stomach. Bilateral chest tubes in similar position.  Left-sided PICC terminating in the left SVC. The cardiomediastinum and  upper abdomen are unchanged. No pleural effusion. Trace apical  pneumothoraces. No new focal airspace opacities. Streaky retrocardiac  opacities likely representing shifting atelectasis.      Impression    Impression:   1. Trace apical pneumothoraces, similar to prior. Chest tubes remain  in place.  2. Additional lines and tubes as above.    I have personally reviewed the examination and initial interpretation  and I agree with the findings.    MICHAEL HOLDER MD       =========================================

## 2019-03-13 NOTE — PLAN OF CARE
Pt stable overnight. Mental status improving. Generally sleeping between care. Tolerating fluid removal with CRRT, net negative 653 mL since MN. Voided 35 mL. Watery diarrhea x3 overnight. Whole enteric coated mesalamine pills noted in stool along with other medication powder. Note left for team to address on rounds whether another UC medication might be more available for absorption. Tylenol given q 4 hrs for generalized pain, questionable absorption. Appetite slowly improving. Pt ate entire Gelatein Plus supplement and an ice cream cup last evening. TF infusing at goal rate 55 mL/hr. Heparin gtt therapeutic at 900 units/hr. Dobutamine gtt continues at 2.5 mcg/kg/min.     Continue to encourage daytime wakefulness and increased activity as tolerated. See charting for detailed assessments and interventions.

## 2019-03-13 NOTE — PLAN OF CARE
OT/4A:  Discharge Planner OT   Patient plan for discharge: unstated  Current status: Pt confused. Provided simple sequencing tasks with 25% accuracy. Pt mod VCs to complete simple UE exercises   Barriers to return to prior living situation: medical status, cognition, mobility, ADL I  Recommendations for discharge: ARU  Rationale for recommendations: to progress ADL I and cognition        Entered by: Vidya Salazar 03/13/2019 4:21 PM       Interdisciplinary Non-Pharmacological Management of Delirium:     General Supportive Measures: Ensure adequate hydration and nutrition. Schedule toileting. Appropriate assessment and treatment of pain.   Re-[place] Patient: Ensure clock has correct time and white board has correct date. Communicate clearly, providing explanations as appropriate. Encourage presence of family members for reassurance. Have family/caregiver bring in familiar objects/pictures.  Cognition: Engage in appropriate meaningful communication and activities with patient (current events discussion, word games, magazines, newspapers).   Sensory: Use eyeglasses, hearing aids, or voice amplifiers as appropriate.   Avoid Use of Physical Restraints as Appropriate: Indicate need and frequently re-assess maddox catheters and other tethers (cap IVs if medically appropriate).   Maintain Mobility and Self Care Ability: Avoid bedrest. Have patient up in chair for meals if appropriate.   Normalize Sleep-Wake Cycle: Discourage too much daytime napping (less than 30 minutes at a time), aim for uninterrupted periods of sleep at night.   Days: Keep room well light with lights on and shades open.   Night: Keep room quiet with low level lighting.   For Agitation: Avoid overstimulating environment, try music, massage, appropriate TV stations, and relaxation techniques. Take patient for a walk if appropriate, even if in the middle of the night.   Safety Concerns: Patients with delirium are at high risk for falls. Use bed and chair  alarms along with frequent surveillance.

## 2019-03-13 NOTE — PLAN OF CARE
Patient stable throughout day. Mental status slowly improving. Sleeping between care. Tolerating fluid removal with CRRT, net negative 1300 since midnight. Hgb 6.5, awaiting unit of PRBCs. Voided, unmeasurable. 3x loose watery stools, mesalamine pills noted in stool as well as powder from other medications. MD aware. Appetite poor, eats less than 25% of each meal. TF infusing at goal rate of 55 mL/hr, Heparin gtt at 900 units an hour. Dobutamine gtt at 2.5 mcg/kg/min. Up in chair for 3 hours.   Continue to increase activity, notify MD with any concerns.

## 2019-03-13 NOTE — PROGRESS NOTES
Nephrology Progress Note  March 13, 2019      Today Recommendations:  Continue CRRT - goal neg 1-1.5 L in 24 hours, if Filter Clots, will discontinue CRRT.  Plan for HD trial in AM (3/14)    ASSESSMENT AND RECOMMENDATIONS:   Mr. Larios is a 53 year old male with history of chronic renal insufficiency, EtOH abuse, GIB w/splenic embolization, PAF s/p ablation and cardioversion, NICM c/b cardiogenic shock requiring IABP, s/p OHT (2/24/19) on tacrolimus and MMF, complicated by RV dysfunction per echo and acute oliguric kidney injury.      # Anuric FRANKLIN on CKD stage II-III:  Patient with history of chronic renal insufficiency and cortical scarring of the L kidney per Renal US 03/2016. Baseline Crt 1.1- 1.4 over the last 6 months; however, prior BMPs have demonstrated patient Crt as high as 2.0. He most likely has a baseline level of CKD due to renal artery stenosis (longstanding smoking history, abdominal bruit on physical examination) and possible smaller FRANKLIN episodes from HFrEF exacerbations. Current rise in creatinine started 3/1 at 1.89, and increasing to 4.01 3/7 and oliguria is most likely due to nephrotoxicity associated with tacrolimus (a renal vasoconstrictor), and diuretic use.  ----  At this time, remains anuric. Given tenuous BP and overall clinical improvement with CRRT, will continue CRRT for management of FRANKLIN. Discused with ICU/Cards, will plan for iHD trial in AM.    Immunosuppressants:  MMF, Tacrolimus, managed by transplant team.      #Electrolytes. Acid-base:  Acceptable on CRRT.     #Hypotension-Volume status:  Overall, on low dose Dobutamine with acceptable BP on CRRT. Plan to pull UF as tolerated with goal Weight of ~54 kg. Will plan to pull gently given OHT and tenuous hemodynamics.     #Anemia:  Hgb stable ~ 7. No obvious clinical bleeding. Transfusion per primary.     Recommendations were communicated to primary team via Note.      Seen and discussed with Dr. Paul.     JANET Herbert,  MD  [phone]    Interval History :   Nursing and provider notes from last 24 hours reviewed.  NAEON. Much more alert this AM, communicative. Tolerating CRRT. Answers Qs appropriately. Remains anuric. Remains on dobutamine. No fevers. Denies chest pain or abd pain.     MEDICATIONS:  Current Meds    B and C vitamin Complex with folic acid  5 mL Per Feeding Tube Daily     DULoxetine  20 mg Oral Daily     fiber modular (NUTRISOURCE FIBER)  1 packet Per Feeding Tube Q6H     fluticasone  2 puff Inhalation Q12H     heparin lock flush  5 mL Intravenous Q24H     heparin lock flush  5-10 mL Intracatheter Q24H     levothyroxine  100 mcg Oral Daily     melatonin  3 mg Oral At Bedtime     mesalamine  2,400 mg Oral BID     montelukast  10 mg Oral At Bedtime     mycophenolate  1,000 mg Oral or Feeding Tube BID    And     mycophenolate  1,000 mg Oral BID     nystatin  500,000 Units Oral 4x Daily     piperacillin-tazobactam  4.5 g Intravenous Q6H     predniSONE  15 mg Oral BID w/meals     ranitidine  150 mg Oral Daily     rosuvastatin  10 mg Oral Daily     senna-docusate  1 tablet Oral BID    Or     senna-docusate  2 tablet Oral BID     sildenafil  20 mg Oral TID     sodium chloride (PF)  3 mL Intracatheter Q8H     tacrolimus  0.5 mg Oral BID IS    Or     tacrolimus  0.5 mg Per Feeding Tube BID IS     terbutaline  10 mg Oral Q8H     valGANciclovir  450 mg Oral or Feeding Tube Every Other Day     Infusion Meds    IV fluid REPLACEMENT ONLY       CRRT replacement solution 12.5 mL/kg/hr (03/13/19 0551)     DOBUTamine 2.5 mcg/kg/min (03/13/19 0800)     HEParin Stopped (03/13/19 0717)     - MEDICATION INSTRUCTIONS -       - MEDICATION INSTRUCTIONS -       - MEDICATION INSTRUCTIONS -       CRRT replacement solution 3.656 mL/kg/hr (03/12/19 1832)     CRRT replacement solution 12.5 mL/kg/hr (03/13/19 0550)     BETA BLOCKER NOT PRESCRIBED       REVIEW OF SYSTEMS:  A comprehensive of systems was not obtained due to mental status.      PHYSICAL  "EXAM:   Temp  Av.9  F (36.6  C)  Min: 95.9  F (35.5  C)  Max: 100  F (37.8  C)  Arterial Line BP  Min: 68/56  Max: 182/36  Arterial Line BP 2  Min: 90/54  Max: 107/56  Arterial Line MAP (mmHg)  Av.7 mmHg  Min: 57 mmHg  Max: 123 mmHg Arterial Line Location 2: Right femoral    Pulse  Av.5  Min: 59  Max: 140 Resp  Av.1  Min: 8  Max: 45  FiO2 (%)  Av %  Min: 40 %  Max: 100 %  SpO2  Av.1 %  Min: 88 %  Max: 100 %    CVP (mmHg): 21 mmHg  /60   Pulse 96   Temp 98.4  F (36.9  C) (Axillary)   Resp 21   Ht 1.727 m (5' 8\")   Wt 57.5 kg (126 lb 12.2 oz)   SpO2 99%   BMI 19.27 kg/m     Date 19 0700 - 19 0659   Shift 3983-3595 0040-3438 3717-5764 24 Hour Total   INTAKE   P.O. 100   100   I.V. 70.4   70.4   Shift Total(mL/kg) 170.4(2.74)   170.4(2.74)   OUTPUT   Chest Tube(mL/kg) 238(3.83)   238(3.83)   Shift Total(mL/kg) 238(3.83)   238(3.83)   Weight (kg) 62.1 62.1 62.1 62.1      Admit Weight: 63.5 kg (140 lb 1.6 oz)     GENERAL APPEARANCE: Alert  Lymphatics: no cervical  LAD  HEENT: NGT in place, sclerae anicteric  Pulmonary: lungs clear to auscultation with equal breath sounds bilaterally  CV: regular rhythm, normal rate   - Edema: Trace B/L LE  GI: soft, nontender, normal bowel sounds.  SKIN: no rash, warm  NEURO: face symmetric, AO x 3    LABS:   I personally reviewed the labs.     CMP  Recent Labs   Lab 19  1000 19  0334 19  2130 19  1548    137 138 138   POTASSIUM 4.4 3.8 4.0 4.3   CHLORIDE 106 106 106 105   CO2 21 20 22 20   ANIONGAP 12 11 10 12   * 180* 180* 124*   BUN 26 26 25 26   CR 1.34* 1.36* 1.40* 1.46*   GFRESTIMATED 59* 58* 56* 53*   GFRESTBLACK 68 67 65 61   RADAMES 7.0* 8.1* 7.6* 7.8*   MAG 2.4* 2.4* 2.4* 2.5*   PHOS 2.3* 2.7 3.1 3.6   PROTTOTAL 4.8* 4.7* 4.9* 5.1*   ALBUMIN 2.4* 2.4* 2.5* 2.6*   BILITOTAL 0.7 0.8 0.8 0.9   ALKPHOS 105 102 104 107   AST 28 30 36 38   ALT 25 24 24 25     CBC  Recent Labs   Lab 19  1000 " 03/13/19  0334 03/12/19  2130 03/12/19  1548   HGB 7.0* 7.1* 7.1* 7.3*   WBC 18.0* 14.9* 14.8* 14.5*   RBC 2.30* 2.33* 2.35* 2.44*   HCT 22.2* 22.2* 22.6* 23.3*   MCV 97 95 96 96   MCH 30.4 30.5 30.2 29.9   MCHC 31.5 32.0 31.4* 31.3*   RDW 16.8* 16.5* 16.7* 16.7*   PLT 68* 71* 79* 89*     INR  Recent Labs   Lab 03/11/19  0338   INR 1.50*     ABG  Recent Labs   Lab 03/11/19  1152 03/10/19  1214 03/09/19  0400 03/08/19  2147   O2PER 21 21 21 21.0      URINE STUDIES  Recent Labs   Lab Test 03/04/19  1303 02/23/19  1921 02/18/19  0957 11/15/18  1000   COLOR Yellow Yellow Yellow Yellow   APPEARANCE Clear Clear Clear Clear   URINEGLC Negative Negative Negative Negative   URINEBILI Negative Negative Negative Negative   URINEKETONE Negative Negative Negative Negative   SG 1.014 1.008 1.008 1.015   UBLD Negative Negative Negative Negative   URINEPH 5.0 6.0 5.5 5.0   PROTEIN Negative Negative Negative Negative   NITRITE Negative Negative Negative Negative   LEUKEST Negative Negative Negative Negative   RBCU 0 <1 <1 <1   WBCU 0 <1 1 1     No lab results found.  PTH  No lab results found.  IRON STUDIES  Recent Labs   Lab Test 11/15/18  0955 10/11/18  1235   IRON  --  63   FEB  --  457*   IRONSAT  --  14*   NATE 1,045*  --        IMAGING:  Reviewed  I, Abhijeet Paul, saw this patient on 03/13/2019 with Dr. Herbert and agree with the findings and plan of care as documented in the note.

## 2019-03-13 NOTE — PROGRESS NOTES
CRRT STATUS NOTE    DATA:  Time:  5:39 PM  Pressures WNL:  YES  Filter Status:  WDL  Problems Reported/Alarms Noted:  None reported/ none needed.   Supplies Present:  YES    ASSESSMENT:  Patient Net Fluid Balance:  Yesterday -2L. Today thus far: -1.2L.   Vital Signs: On Dobutamine at 2.5. . /58 (69). Spo2 100% on RA; RR 14.  T 98.3F ax.   Labs:  K 3.9, CO2 24. BUN 25. CR 1.19. HGB 6.5  Goals of Therapy:  -50-100mL/hr keeping MAP >65. (If <65 or Dobutamine increase, notify Nephrology on call. Goal Net - 1.5L today). Meeting goals of therapy, -1.2L already.    INTERVENTIONS:   None needed today.     PLAN:  Per Nephrology ok to discontinue CRRT when circuit clots. Will tonight at 1900 for RN staffing needs. Dr. Boyer to F/U need for intermittent HD tomorrow.

## 2019-03-13 NOTE — PROGRESS NOTES
CLINICAL NUTRITION SERVICES - brief note. See 3/11 note for full assessment.     GI: BM- 700 mL 3/12. Has been having ongoing diarrhea the past few days.       Interventions  Collaboration with other providers - ICU Rounds  Enteral Nutrition - added nutrisource fiber 1 pkt q6 hours        RD will continue to follow    Cecilia Muir, Dietetic Intern        I have read and agree with above interventions.  Amber Lemon, EDD, LD, CNSC  CVICU Dietitian  Pager: [phone]

## 2019-03-13 NOTE — PLAN OF CARE
PT 4A: Discharge Planner PT   Patient plan for discharge: Does not know, delirious  Current status: Delirium and difficulty with sequencing/complex command-following continues.  Min A bed mobility, min-mod A x1-2 with FWW sit<>stands pending fatigue level.  Pt becomes tachypneic quickly in standing and fatigues quickly, vitals remain stable. Pt is a bit self-limiting 2/2 lack of insight into current status.    Barriers to return to prior living situation: Medical status, requiring assist for mobility and ADL  Recommendations for discharge: ARU, if medical complexity continues to rise and team recommends LTACH likely to still recommend ARU once LTACH goals are met  Rationale for recommendations: Pt is very below functional baseline and with many rehab needs setting of the medical complexity of OHT        Entered by: Marla Arreola 03/13/2019 10:55 AM

## 2019-03-14 ENCOUNTER — APPOINTMENT (OUTPATIENT)
Dept: OCCUPATIONAL THERAPY | Facility: CLINIC | Age: 54
DRG: 001 | End: 2019-03-14
Attending: INTERNAL MEDICINE
Payer: COMMERCIAL

## 2019-03-14 LAB
ALBUMIN SERPL-MCNC: 2.2 G/DL (ref 3.4–5)
ALP SERPL-CCNC: 97 U/L (ref 40–150)
ALT SERPL W P-5'-P-CCNC: 23 U/L (ref 0–70)
ANION GAP SERPL CALCULATED.3IONS-SCNC: 9 MMOL/L (ref 3–14)
ANION GAP SERPL CALCULATED.3IONS-SCNC: 9 MMOL/L (ref 3–14)
APTT PPP: 46 SEC (ref 22–37)
AST SERPL W P-5'-P-CCNC: 23 U/L (ref 0–45)
BILIRUB DIRECT SERPL-MCNC: 0.4 MG/DL (ref 0–0.2)
BILIRUB SERPL-MCNC: 0.6 MG/DL (ref 0.2–1.3)
BUN SERPL-MCNC: 15 MG/DL (ref 7–30)
BUN SERPL-MCNC: 33 MG/DL (ref 7–30)
CALCIUM SERPL-MCNC: 7.4 MG/DL (ref 8.5–10.1)
CALCIUM SERPL-MCNC: 7.6 MG/DL (ref 8.5–10.1)
CHLORIDE SERPL-SCNC: 105 MMOL/L (ref 94–109)
CHLORIDE SERPL-SCNC: 106 MMOL/L (ref 94–109)
CO2 SERPL-SCNC: 23 MMOL/L (ref 20–32)
CO2 SERPL-SCNC: 23 MMOL/L (ref 20–32)
CREAT SERPL-MCNC: 1.16 MG/DL (ref 0.66–1.25)
CREAT SERPL-MCNC: 1.68 MG/DL (ref 0.66–1.25)
ERYTHROCYTE [DISTWIDTH] IN BLOOD BY AUTOMATED COUNT: 17.4 % (ref 10–15)
ERYTHROCYTE [DISTWIDTH] IN BLOOD BY AUTOMATED COUNT: 17.4 % (ref 10–15)
FIBRINOGEN PPP-MCNC: 189 MG/DL (ref 200–420)
GFR SERPL CREATININE-BSD FRML MDRD: 45 ML/MIN/{1.73_M2}
GFR SERPL CREATININE-BSD FRML MDRD: 70 ML/MIN/{1.73_M2}
GLUCOSE BLDC GLUCOMTR-MCNC: 207 MG/DL (ref 70–99)
GLUCOSE SERPL-MCNC: 103 MG/DL (ref 70–99)
GLUCOSE SERPL-MCNC: 197 MG/DL (ref 70–99)
HBV SURFACE AB SERPL IA-ACNC: 0.7 M[IU]/ML
HBV SURFACE AG SERPL QL IA: NONREACTIVE
HCT VFR BLD AUTO: 22.1 % (ref 40–53)
HCT VFR BLD AUTO: 22.5 % (ref 40–53)
HGB BLD-MCNC: 6.9 G/DL (ref 13.3–17.7)
HGB BLD-MCNC: 7.1 G/DL (ref 13.3–17.7)
INR PPP: 1.33 (ref 0.86–1.14)
INR PPP: 1.36 (ref 0.86–1.14)
LDH SERPL L TO P-CCNC: 320 U/L (ref 85–227)
LMWH PPP CHRO-ACNC: 0.13 IU/ML
LMWH PPP CHRO-ACNC: 0.32 IU/ML
MAGNESIUM SERPL-MCNC: 2.2 MG/DL (ref 1.6–2.3)
MCH RBC QN AUTO: 29.5 PG (ref 26.5–33)
MCH RBC QN AUTO: 30.2 PG (ref 26.5–33)
MCHC RBC AUTO-ENTMCNC: 31.2 G/DL (ref 31.5–36.5)
MCHC RBC AUTO-ENTMCNC: 31.6 G/DL (ref 31.5–36.5)
MCV RBC AUTO: 94 FL (ref 78–100)
MCV RBC AUTO: 96 FL (ref 78–100)
PF4 HEPARIN CMPLX AB SER QL: NEGATIVE
PHOSPHATE SERPL-MCNC: 1.6 MG/DL (ref 2.5–4.5)
PLATELET # BLD AUTO: 38 10E9/L (ref 150–450)
PLATELET # BLD AUTO: 38 10E9/L (ref 150–450)
PLATELET # BLD AUTO: 40 10E9/L (ref 150–450)
POTASSIUM SERPL-SCNC: 3.8 MMOL/L (ref 3.4–5.3)
POTASSIUM SERPL-SCNC: 3.9 MMOL/L (ref 3.4–5.3)
PROT SERPL-MCNC: 4.3 G/DL (ref 6.8–8.8)
RBC # BLD AUTO: 2.34 10E12/L (ref 4.4–5.9)
RBC # BLD AUTO: 2.35 10E12/L (ref 4.4–5.9)
RETICS # AUTO: 100.8 10E9/L (ref 25–95)
RETICS/RBC NFR AUTO: 4.1 % (ref 0.5–2)
SODIUM SERPL-SCNC: 137 MMOL/L (ref 133–144)
SODIUM SERPL-SCNC: 137 MMOL/L (ref 133–144)
TACROLIMUS BLD-MCNC: 5.7 UG/L (ref 5–15)
TME LAST DOSE: NORMAL H
WBC # BLD AUTO: 15.4 10E9/L (ref 4–11)
WBC # BLD AUTO: 16.6 10E9/L (ref 4–11)

## 2019-03-14 PROCEDURE — 25000132 ZZH RX MED GY IP 250 OP 250 PS 637: Performed by: INTERNAL MEDICINE

## 2019-03-14 PROCEDURE — 25800030 ZZH RX IP 258 OP 636: Performed by: STUDENT IN AN ORGANIZED HEALTH CARE EDUCATION/TRAINING PROGRAM

## 2019-03-14 PROCEDURE — 86706 HEP B SURFACE ANTIBODY: CPT | Performed by: INTERNAL MEDICINE

## 2019-03-14 PROCEDURE — 25000132 ZZH RX MED GY IP 250 OP 250 PS 637: Performed by: STUDENT IN AN ORGANIZED HEALTH CARE EDUCATION/TRAINING PROGRAM

## 2019-03-14 PROCEDURE — 99233 SBSQ HOSP IP/OBS HIGH 50: CPT | Mod: GC | Performed by: INTERNAL MEDICINE

## 2019-03-14 PROCEDURE — 85049 AUTOMATED PLATELET COUNT: CPT | Performed by: THORACIC SURGERY (CARDIOTHORACIC VASCULAR SURGERY)

## 2019-03-14 PROCEDURE — 85027 COMPLETE CBC AUTOMATED: CPT | Performed by: STUDENT IN AN ORGANIZED HEALTH CARE EDUCATION/TRAINING PROGRAM

## 2019-03-14 PROCEDURE — 80053 COMPREHEN METABOLIC PANEL: CPT | Performed by: STUDENT IN AN ORGANIZED HEALTH CARE EDUCATION/TRAINING PROGRAM

## 2019-03-14 PROCEDURE — 40000133 ZZH STATISTIC OT WARD VISIT: Performed by: OCCUPATIONAL THERAPIST

## 2019-03-14 PROCEDURE — 85520 HEPARIN ASSAY: CPT | Performed by: STUDENT IN AN ORGANIZED HEALTH CARE EDUCATION/TRAINING PROGRAM

## 2019-03-14 PROCEDURE — 25000125 ZZHC RX 250: Performed by: INTERNAL MEDICINE

## 2019-03-14 PROCEDURE — 86022 PLATELET ANTIBODIES: CPT | Performed by: THORACIC SURGERY (CARDIOTHORACIC VASCULAR SURGERY)

## 2019-03-14 PROCEDURE — 85384 FIBRINOGEN ACTIVITY: CPT | Performed by: STUDENT IN AN ORGANIZED HEALTH CARE EDUCATION/TRAINING PROGRAM

## 2019-03-14 PROCEDURE — 84100 ASSAY OF PHOSPHORUS: CPT | Performed by: STUDENT IN AN ORGANIZED HEALTH CARE EDUCATION/TRAINING PROGRAM

## 2019-03-14 PROCEDURE — 90937 HEMODIALYSIS REPEATED EVAL: CPT

## 2019-03-14 PROCEDURE — 85730 THROMBOPLASTIN TIME PARTIAL: CPT | Performed by: STUDENT IN AN ORGANIZED HEALTH CARE EDUCATION/TRAINING PROGRAM

## 2019-03-14 PROCEDURE — 80197 ASSAY OF TACROLIMUS: CPT | Performed by: INTERNAL MEDICINE

## 2019-03-14 PROCEDURE — 00000146 ZZHCL STATISTIC GLUCOSE BY METER IP

## 2019-03-14 PROCEDURE — 25000128 H RX IP 250 OP 636: Performed by: ANESTHESIOLOGY

## 2019-03-14 PROCEDURE — 27210429 ZZH NUTRITION PRODUCT INTERMEDIATE LITER

## 2019-03-14 PROCEDURE — 25000128 H RX IP 250 OP 636: Performed by: HOSPITALIST

## 2019-03-14 PROCEDURE — 82248 BILIRUBIN DIRECT: CPT | Performed by: STUDENT IN AN ORGANIZED HEALTH CARE EDUCATION/TRAINING PROGRAM

## 2019-03-14 PROCEDURE — 85610 PROTHROMBIN TIME: CPT | Performed by: STUDENT IN AN ORGANIZED HEALTH CARE EDUCATION/TRAINING PROGRAM

## 2019-03-14 PROCEDURE — 85520 HEPARIN ASSAY: CPT | Performed by: INTERNAL MEDICINE

## 2019-03-14 PROCEDURE — 25000125 ZZHC RX 250: Performed by: STUDENT IN AN ORGANIZED HEALTH CARE EDUCATION/TRAINING PROGRAM

## 2019-03-14 PROCEDURE — 97535 SELF CARE MNGMENT TRAINING: CPT | Mod: GO | Performed by: OCCUPATIONAL THERAPIST

## 2019-03-14 PROCEDURE — 25000128 H RX IP 250 OP 636: Performed by: INTERNAL MEDICINE

## 2019-03-14 PROCEDURE — 85610 PROTHROMBIN TIME: CPT | Performed by: INTERNAL MEDICINE

## 2019-03-14 PROCEDURE — 25000131 ZZH RX MED GY IP 250 OP 636 PS 637: Performed by: HOSPITALIST

## 2019-03-14 PROCEDURE — 83615 LACTATE (LD) (LDH) ENZYME: CPT | Performed by: STUDENT IN AN ORGANIZED HEALTH CARE EDUCATION/TRAINING PROGRAM

## 2019-03-14 PROCEDURE — 25000132 ZZH RX MED GY IP 250 OP 250 PS 637: Performed by: SURGERY

## 2019-03-14 PROCEDURE — 85004 AUTOMATED DIFF WBC COUNT: CPT | Performed by: INTERNAL MEDICINE

## 2019-03-14 PROCEDURE — 83010 ASSAY OF HAPTOGLOBIN QUANT: CPT | Performed by: STUDENT IN AN ORGANIZED HEALTH CARE EDUCATION/TRAINING PROGRAM

## 2019-03-14 PROCEDURE — 20000004 ZZH R&B ICU UMMC

## 2019-03-14 PROCEDURE — 85045 AUTOMATED RETICULOCYTE COUNT: CPT | Performed by: STUDENT IN AN ORGANIZED HEALTH CARE EDUCATION/TRAINING PROGRAM

## 2019-03-14 PROCEDURE — 83735 ASSAY OF MAGNESIUM: CPT | Performed by: STUDENT IN AN ORGANIZED HEALTH CARE EDUCATION/TRAINING PROGRAM

## 2019-03-14 PROCEDURE — 40000611 ZZHCL STATISTIC MORPHOLOGY W/INTERP HEMEPATH TC 85060: Performed by: ANESTHESIOLOGY

## 2019-03-14 PROCEDURE — 87340 HEPATITIS B SURFACE AG IA: CPT | Performed by: INTERNAL MEDICINE

## 2019-03-14 PROCEDURE — 25000132 ZZH RX MED GY IP 250 OP 250 PS 637: Performed by: ANESTHESIOLOGY

## 2019-03-14 PROCEDURE — 99222 1ST HOSP IP/OBS MODERATE 55: CPT | Mod: GC | Performed by: INTERNAL MEDICINE

## 2019-03-14 PROCEDURE — 25000131 ZZH RX MED GY IP 250 OP 636 PS 637: Performed by: INTERNAL MEDICINE

## 2019-03-14 PROCEDURE — 80048 BASIC METABOLIC PNL TOTAL CA: CPT | Performed by: INTERNAL MEDICINE

## 2019-03-14 PROCEDURE — 5A1D70Z PERFORMANCE OF URINARY FILTRATION, INTERMITTENT, LESS THAN 6 HOURS PER DAY: ICD-10-PCS | Performed by: INTERNAL MEDICINE

## 2019-03-14 PROCEDURE — 85027 COMPLETE CBC AUTOMATED: CPT | Performed by: INTERNAL MEDICINE

## 2019-03-14 PROCEDURE — 85045 AUTOMATED RETICULOCYTE COUNT: CPT | Performed by: INTERNAL MEDICINE

## 2019-03-14 RX ORDER — HEPARIN SODIUM 10000 [USP'U]/100ML
0-3500 INJECTION, SOLUTION INTRAVENOUS CONTINUOUS
Status: DISCONTINUED | OUTPATIENT
Start: 2019-03-14 | End: 2019-03-18

## 2019-03-14 RX ORDER — LIDOCAINE 4 G/G
1 PATCH TOPICAL
Status: DISCONTINUED | OUTPATIENT
Start: 2019-03-14 | End: 2019-03-29

## 2019-03-14 RX ORDER — SODIUM PHOSPHATE,MONOBASIC
4 GRANULES (GRAM) MISCELLANEOUS ONCE
Status: COMPLETED | OUTPATIENT
Start: 2019-03-14 | End: 2019-03-14

## 2019-03-14 RX ORDER — TACROLIMUS 1 MG/1
1 CAPSULE ORAL
Status: DISCONTINUED | OUTPATIENT
Start: 2019-03-14 | End: 2019-03-14

## 2019-03-14 RX ADMIN — ACETAMINOPHEN 650 MG: 325 TABLET, FILM COATED ORAL at 16:43

## 2019-03-14 RX ADMIN — PIPERACILLIN SODIUM AND TAZOBACTAM SODIUM 2.25 G: 2; .25 INJECTION, POWDER, LYOPHILIZED, FOR SOLUTION INTRAVENOUS at 01:36

## 2019-03-14 RX ADMIN — LIDOCAINE 1 PATCH: 560 PATCH PERCUTANEOUS; TOPICAL; TRANSDERMAL at 09:19

## 2019-03-14 RX ADMIN — MESALAMINE 2400 MG: 800 TABLET, DELAYED RELEASE ORAL at 08:17

## 2019-03-14 RX ADMIN — ACETAMINOPHEN 650 MG: 325 TABLET, FILM COATED ORAL at 08:13

## 2019-03-14 RX ADMIN — POTASSIUM PHOSPHATE, MONOBASIC AND POTASSIUM PHOSPHATE, DIBASIC 20 MMOL: 224; 236 INJECTION, SOLUTION INTRAVENOUS at 09:22

## 2019-03-14 RX ADMIN — SODIUM CHLORIDE 300 ML: 9 INJECTION, SOLUTION INTRAVENOUS at 12:27

## 2019-03-14 RX ADMIN — Medication 1 PACKET: at 18:31

## 2019-03-14 RX ADMIN — SODIUM CHLORIDE 250 ML: 9 INJECTION, SOLUTION INTRAVENOUS at 12:28

## 2019-03-14 RX ADMIN — ACETAMINOPHEN 650 MG: 325 TABLET, FILM COATED ORAL at 01:16

## 2019-03-14 RX ADMIN — Medication 500000 UNITS: at 20:53

## 2019-03-14 RX ADMIN — SILDENAFIL 20 MG: 20 TABLET ORAL at 14:15

## 2019-03-14 RX ADMIN — Medication 1 PACKET: at 06:04

## 2019-03-14 RX ADMIN — MESALAMINE 2400 MG: 800 TABLET, DELAYED RELEASE ORAL at 20:54

## 2019-03-14 RX ADMIN — Medication 500000 UNITS: at 12:34

## 2019-03-14 RX ADMIN — TACROLIMUS 1.5 MG: 1 CAPSULE ORAL at 18:31

## 2019-03-14 RX ADMIN — HEPARIN SODIUM 650 UNITS/HR: 10000 INJECTION, SOLUTION INTRAVENOUS at 12:48

## 2019-03-14 RX ADMIN — Medication 1 PACKET: at 12:34

## 2019-03-14 RX ADMIN — BENZOCAINE, MENTHOL 1 LOZENGE: 15; 3.6 LOZENGE ORAL at 20:53

## 2019-03-14 RX ADMIN — Medication 4 MG%: at 12:30

## 2019-03-14 RX ADMIN — PREDNISONE 15 MG: 10 TABLET ORAL at 18:31

## 2019-03-14 RX ADMIN — Medication 500000 UNITS: at 16:42

## 2019-03-14 RX ADMIN — Medication 5 ML: at 08:24

## 2019-03-14 RX ADMIN — DULOXETINE 20 MG: 20 CAPSULE, DELAYED RELEASE ORAL at 08:14

## 2019-03-14 RX ADMIN — SILDENAFIL 20 MG: 20 TABLET ORAL at 08:14

## 2019-03-14 RX ADMIN — TERBUTALINE SULFATE 10 MG: 5 TABLET ORAL at 08:17

## 2019-03-14 RX ADMIN — PREDNISONE 15 MG: 10 TABLET ORAL at 08:14

## 2019-03-14 RX ADMIN — MYCOPHENOLATE MOFETIL 1000 MG: 250 CAPSULE ORAL at 08:13

## 2019-03-14 RX ADMIN — FLUTICASONE PROPIONATE 2 PUFF: 220 AEROSOL, METERED RESPIRATORY (INHALATION) at 20:52

## 2019-03-14 RX ADMIN — MONTELUKAST SODIUM 10 MG: 10 TABLET, COATED ORAL at 21:01

## 2019-03-14 RX ADMIN — ROSUVASTATIN CALCIUM 10 MG: 10 TABLET, FILM COATED ORAL at 08:13

## 2019-03-14 RX ADMIN — ACETAMINOPHEN 650 MG: 325 TABLET, FILM COATED ORAL at 12:34

## 2019-03-14 RX ADMIN — Medication 1 PACKET: at 23:52

## 2019-03-14 RX ADMIN — MYCOPHENOLATE MOFETIL 1000 MG: 200 POWDER, FOR SUSPENSION ORAL at 08:25

## 2019-03-14 RX ADMIN — FLUTICASONE PROPIONATE 2 PUFF: 220 AEROSOL, METERED RESPIRATORY (INHALATION) at 08:18

## 2019-03-14 RX ADMIN — SILDENAFIL 20 MG: 20 TABLET ORAL at 21:01

## 2019-03-14 RX ADMIN — MELATONIN TAB 3 MG 3 MG: 3 TAB at 21:01

## 2019-03-14 RX ADMIN — RANITIDINE 150 MG: 150 TABLET ORAL at 08:14

## 2019-03-14 RX ADMIN — CEFEPIME 2 G: 2 INJECTION, POWDER, FOR SOLUTION INTRAVENOUS at 18:32

## 2019-03-14 RX ADMIN — MYCOPHENOLATE MOFETIL 1000 MG: 200 POWDER, FOR SUSPENSION ORAL at 20:54

## 2019-03-14 RX ADMIN — LEVOTHYROXINE SODIUM 100 MCG: 100 TABLET ORAL at 08:13

## 2019-03-14 RX ADMIN — Medication 500000 UNITS: at 08:18

## 2019-03-14 ASSESSMENT — ACTIVITIES OF DAILY LIVING (ADL)
ADLS_ACUITY_SCORE: 15

## 2019-03-14 ASSESSMENT — MIFFLIN-ST. JEOR: SCORE: 1363.5

## 2019-03-14 NOTE — PROGRESS NOTES
ADVANCED HEART FAILURE PROGRESS NOTE    SUBJECTIVE:  No acute events.  States he slept better but not great.    ROS otherwise negative.    OBJECTIVE:  Vital signs:  Temp: 97.7  F (36.5  C) Temp  Min: 97  F (36.1  C)  Max: 98.8  F (37.1  C)  Heart Rate: 99 Heart Rate  Min: 92  Max: 105  BP: 106/57 Systolic (24hrs), Av , Min:92 , Max:114   Diastolic (24hrs), Av, Min:49, Max:91    Resp: 12 Resp  Min: 12  Max: 26  SpO2: 98 % SpO2  Min: 97 %  Max: 100 %    Intake/Output Summary (Last 24 hours) at 3/14/2019 0644  Last data filed at 3/14/2019 0600  Gross per 24 hour   Intake 3623.75 ml   Output 2694 ml   Net 929.75 ml       Vitals:    19 0000 19 0400 19 0400   Weight: 62 kg (136 lb 11 oz) 59.8 kg (131 lb 13.4 oz) 57.5 kg (126 lb 12.2 oz)       Physical Exam:  GEN:  no distress, sitting in chair  CV:  Regular rate and rhythm, no murmur.   CHEST: Subclavian incision sutured.  Chest tubes.  LUNGS:  On room air, normal work of breathing, Clear to auscultation bilaterally, no wheezes or crackles.   ABD:  Active bowel sounds, soft, non-tender/non-distended.  No rebound/guarding/rigidity.  EXT:  No edema or cyanosis. Normal distal puses  NEURO: no focal deficits      Medications:    IV fluid REPLACEMENT ONLY       DOBUTamine 2.5 mcg/kg/min (19 0400)     HEParin Stopped (19 0600)     - MEDICATION INSTRUCTIONS -       - MEDICATION INSTRUCTIONS -       BETA BLOCKER NOT PRESCRIBED         Current Facility-Administered Medications   Medication Dose Route Frequency     B and C vitamin Complex with folic acid  5 mL Per Feeding Tube Daily     DULoxetine  20 mg Oral Daily     fiber modular (NUTRISOURCE FIBER)  1 packet Per Feeding Tube Q6H     fluticasone  2 puff Inhalation Q12H     heparin lock flush  5 mL Intravenous Q24H     heparin lock flush  5-10 mL Intracatheter Q24H     levothyroxine  100 mcg Oral Daily     melatonin  3 mg Oral At Bedtime     mesalamine  2,400 mg Oral BID     montelukast  10  mg Oral At Bedtime     mycophenolate  1,000 mg Oral or Feeding Tube BID    And     mycophenolate  1,000 mg Oral BID     nystatin  500,000 Units Oral 4x Daily     piperacillin-tazobactam  2.25 g Intravenous Q6H     predniSONE  15 mg Oral BID w/meals     ranitidine  150 mg Oral Daily     rosuvastatin  10 mg Oral Daily     senna-docusate  1 tablet Oral BID    Or     senna-docusate  2 tablet Oral BID     sildenafil  20 mg Oral TID     sodium chloride (PF)  3 mL Intracatheter Q8H     tacrolimus  1 mg Oral BID IS     terbutaline  10 mg Oral Q8H     [START ON 3/16/2019] valGANciclovir  450 mg Oral or Feeding Tube Once per day on Tue Sat         Labs:  CMP  Recent Labs   Lab 03/14/19  0329 03/13/19  1545  03/10/19  0410  03/09/19  1148    139   < > 133   < >  --    POTASSIUM 3.8 3.9   < > 4.6   < >  --    CHLORIDE 106 106   < > 102   < >  --    CO2 23 24   < > 18*   < >  --    BUN 33* 25   < > 58*   < >  --    CR 1.68* 1.19   < > 2.61*   < >  --    RADAMES 7.4* 7.6*   < > 8.0*   < >  --    MAG 2.2 2.4*   < > 2.7*   < >  --    PHOS 1.6* 2.2*   < > 5.8*   < >  --    * 173*   < > 88   < >  --    LDH  --   --   --  276*  --  266*   ALKPHOS 97 98   < > 104   < >  --    BILITOTAL 0.6 1.1   < > 0.8   < >  --    AST 23 28   < > 22   < >  --    ALT 23 24   < > 19   < >  --     < > = values in this interval not displayed.     BLOOD GASNo lab results found in last 7 days.  CBC  Recent Labs   Lab 03/14/19  0450 03/14/19  0329 03/13/19  1635   WBC  --  15.4* 17.8*   HGB  --  7.1* 6.5*   HCT  --  22.5* 20.7*   PLT 38* 40* 53*     COAG  Recent Labs   Lab 03/11/19  0338   INR 1.50*         ASSESSMENT/PLAN:  Everton Larios is a 52 yo gentleman who underwent orthotopic heart transplant 2/24/19 for non ischemic cardiomyopathy and cardiogenic shock. Post-op complicated by RV dysfunction and FRANKLIN.     Updates today:  - iHD trial today  - Consultation with hematology regarding dropping hemoglobin, platelets (ITP/TTP?  Medication? Tacro?).   Appreciate recommendations.     #Oliguric renal failure:  Etiology cardiorenal from venous congestion vs. nephrotoxins.  - iHD trial today  - nephrology consulted, appreciate assistance     #Sepsis  - changed to cefepime from zosyn to finish 7 day course (ending 3/16/19)  - cultures no growth to date     #S/p OHT on 2/24:  Graft function:  - echo 3/1 shows normal LV function, RV is mild to moderately dilated and hypokinetic  - dobutamine at 2.5 for continued RV and hemodynamic support  - continue sildenafil 20 q8h  - stop terbutaline while on dobutamine    #Thrombocytopenia/Anemia:  No e/o blood loss.  Renal failure, AMS considering TTP-HUS, medication?  - Changed to cefepime from zosyn  - Stop tacrolimus  - Heme/onc evaluation, appreciate their assistance     Immunosuppression:  - on MMF decreased at 1000 BID given possible sepsis/infection  - s/p methylpred 125 x 3 doses, prednisone down to 15mg BID, decreasing by 5mg daily with negative biopsies  - NOT on Simulect protocol, tacrolimus on hold given above problem (goal 10-12)     Prophylaxis:  - CMV +/+: medium risk, renal dose Valcyte  - PCP: holding Bactrim for now, pentamadine given 3/3  - Thrush: improved on nystatin  - GI: on PPI  - CAV: Crestor, holding ASA given bleeding at line sites     Biopsies:  - First biopsy 3/4, 0R, no AMR  - Second biopsy 3/11, 0R, no AMR  - Third biopsy due 3/18     Goretex conduit for persistent left SVC:  - will need anticoagulation, on low intensity heparin  - NO CENTRAL VENOUS CATHETERS ON THE LEFT UPPER BODY      Seen and staffed with Dr. Galan.     Santino Haque MD  Cardiology

## 2019-03-14 NOTE — CONSULTS
[facility], [place]   Hematology/Oncology Consult Note    Everton Larios MRN# [id number]   Age: 53 year old YOB: 1965          Reason for Consult:   Anemia and thrombocytopenia in patient s/p cardiac transplant, concern for TTP/HUS possibly.          Assessment and Plan:   Everton Larios is a 53 year old M with PMH of NICM, chronic systolic heart failure who was admitted on 2/17/19 for LVAD vs heart transplant.  He underwent cardiac transplant on 2/24/19, complicated by RV dysfunction and FRANKLIN requiring initiation of intermittent hemodialysis.      Hematology was consulted regarding declining platelets and Hgb over the past few days.  Per chart review, plts were in low 100s and began to decline on 3/12/19, now with fam of 38.  He does not have signs or symptoms of bleeding. Hgb decline has been gradual since transplant on 2/24 (~10 on transplant date) and as low as 6.3 on the afternoon of 3/13.  LDH is mildly elevated and total bilirubin is actually downtrending since transplant, making significant hemolysis a less likely etiology.  Evaluation of peripheral smear by hematology noted rare schistocytes (1 per 2-3 HPF) on sample from 3/14, making TTP/HUS unlikely.  He is not jaundiced and does not produce urine for UA evaluation of hemoglobin excretion.  There was also a concern for altered mental status on the morning of consultation, which has been waxing and waning per report in addition to use of tacrolimus for immune suppresision, which led to initial concern of TTP/HUS.  He does not have purpura and is not bleeding and there is low concern of DIC as an etiology.  HIT pretest probability was low and HIT screen was negative on 3/14.  He has been on heparin throughout admission.      The most likely etiology in this case is drug induced.  He has been on zosyn intermittent since transplant, which is a known offending agent for thrombocytopenia.  He was switched  to cefepime the afternoon of 3/14 and would recommend continuing to monitor blood counts after this switch for several days.  If no improvement, can then consider switching valacyclovir to an alternative agent, as it is known to cause thrombocytopenia.  Finally, if switching these agents, a last resort may be switching tacrolimus to an alternative agent.  His absolute reticulocyte response is also lower than anticipated given his level of anemia.  He may have marrow suppression due to nutritional deficit from stress of cardiac transplantation and large turnover of RBCs from need for HD and procedural blood loss. He may need EPO due to renal failure, which is usually managed by nephrology in this context.      Finally, a rare, but serious concern is post-transfusion purpura.  This can happen 7-10 days post RBC transfusion and has an unclear etiology.  Should plts drop <20k, would send screening panel to [facility] and start IVIG 1g/kg x2 days.      Problem list:   # S/p cardiac transplantation, on immune suppression  # renal failure requiring HD  # possible drug related thrombocytopenia  # anemia, multifactorial     Summary of Recommendations:    Agree with switch of zosyn to cefepime.  Monitor CBC for several days prior to next medication change    If no response, consider switching valacyclovir to alternate agent or holding if possible.      If Plt<20k, send post-transfusion purpura screen to [facility] and IVIG 1g/kg x2 days.      Thank you for involving us in the care of this patient. We will continue to follow during the hospitalization.    Patient was seen and plan of care developed with Dr. Richardson.    Ranjeet Jenkins MD/PhD  Heme/Onc Fellow, PGY4  03/14/2019  [phone]    Physician Attestation   I, Paul Richardson, saw this patient with the resident and agree with the resident/fellow's findings and plan of care as documented in the note.      I personally reviewed vital  signs, medications, labs and imaging.    Key findings: 54 YOM with new anemia and thrombocytopenia.  No evidence of MAHA and anemia is chronic secondary to ESRD.  Suspect drug induced thrombocytopenia.      Paul Richardson MD  Date of Service (when I saw the patient): 3/14/19             History of Present Illness:   History obtained from chart review and confirmed with patient.    Everton Larios is a 53 year old M admitted for cardiac transplant, which was completed on 2/24/19.  He has had a complicated post-op course including need for hemodialysis and RV failure.  He notes he is feeling generally better on the initial date of consultation and has an improving appetite, most limited by poor tasting food in hospital.  He denies fever, chills, or night sweats.  He has surgical site pain and R sided neck pain only from HD line.  He denies nausea, vomiting, SOB, cough.  He notes scattered bruises and abdominal pain that is intermittent.  He denies hematemesis, hematochezia, hemoptysis, or hematuria.  He does not have new rashes.  He states he is in generally good spirits after his transplant.  Denies other complaints at this time.         Review of Systems:   A comprehensive ROS was performed with the patient and was found to be negative with the exception of that noted in the HPI above.       Past Medical History:     Past Medical History:   Diagnosis Date     Alcohol abuse      Pierce's esophagus 10/4/2018     Chronic rhinitis 10/4/2018     Chronic systolic heart failure (H) 10/4/2018     Depression 10/4/2018     Diastolic dysfunction      DJD (degenerative joint disease) - neck 10/4/2018     H/O congenital atrial septal defect (ASD) repair at age 3 10/4/2018     Hypothyroidism due to medication 10/4/2018     ICD, [employer] scientific 2008; gen change 2/2018 10/4/2018     Intermittent asthma without complication 10/4/2018     Nonischemic cardiomyopathy (H) 10/4/2018     On amiodarone therapy 10/4/2018      Paroxysmal atrial fibrillation (H) 10/4/2018     S/P ablation of atrial fibrillation 10/4/2018     Systolic heart failure (H)      Ulcerative colitis (H) 10/4/2018     Ventricular tachycardia (H) 10/4/2018            Past Surgical History:     Past Surgical History:   Procedure Laterality Date     AICD, DUAL CHAMBER       CV HEART BIOPSY N/A 3/4/2019    Procedure: Heart Biopsy;  Surgeon: Abhijeet Titus MD;  Location:  HEART CARDIAC CATH LAB     CV INTRA-AORTIC BALLOON N/A 2/18/2019    Procedure: Intra-Aortic Balloon;  Surgeon: Alton Solomon MD;  Location:  HEART CARDIAC CATH LAB     CV INTRA-AORTIC BALLOON N/A 2/20/2019    Procedure: Replace subclavian IABP;  Surgeon: Yves Rodrigues MD;  Location:  HEART CARDIAC CATH LAB     INSERT INTRAAORTIC BALLOON PUMP Left 2/19/2019    Procedure: Insert left  Subclavian Balloon Pump,  Removal Right femoral arterial balloom pump sheath;  Surgeon: Dewyane House MD;  Location:  OR     INSERT INTRAAORTIC BALLOON PUMP Left 2/21/2019    Procedure: SUBCLAVIAN BALLOON PUMP PLACEMENT;  Surgeon: Ben White MD;  Location:  OR     TRANSPLANT HEART RECIPIENT N/A 2/24/2019    Procedure: TRANSPLANT HEART RECIPIENT;  Surgeon: Dewayne House MD;  Location:  OR            Social History:   10 PYH smoking history.   No EtOH.   No recreational drugs.   No herbs/supplements.          Family History:   Denies family history of bleeding, clotting, low blood counts or hematologic malignancy.          Allergies:     Allergies   Allergen Reactions     Hydromorphone Other (See Comments)     Significant Delirium     Lisinopril Other (See Comments)     hypotension     Codeine Nausea            Medications:     Medications Prior to Admission   Medication Sig Dispense Refill Last Dose     albuterol (PROAIR HFA/PROVENTIL HFA/VENTOLIN HFA) 108 (90 Base) MCG/ACT inhaler Inhale 2 puffs into the lungs every 6 hours as needed for shortness of breath / dyspnea  or wheezing   Past Month at Unknown time     Alpha-D-Galactosidase (BEANO PO) Take 2 tablets by mouth 3 times daily    Past Month at Unknown time     amiodarone (PACERONE/CODARONE) 200 MG tablet Take 1 tablet (200 mg) by mouth daily   Past Month at Unknown time     DULoxetine (CYMBALTA) 20 MG EC capsule Take 20 mg by mouth daily   3/1/2019 at Unknown time     Ergocalciferol (VITAMIN D2 PO)    Past Month at Unknown time     fluticasone (FLOVENT HFA) 220 MCG/ACT Inhaler Inhale 2 puffs into the lungs 2 times daily   3/1/2019 at Unknown time     furosemide (LASIX) 20 MG tablet Take 2 tablets (40 mg) by mouth 2 times daily 360 tablet 3 2/28/2019 at Unknown time     levothyroxine (SYNTHROID/LEVOTHROID) 100 MCG tablet Take 100 mcg by mouth daily    3/1/2019 at Unknown time     mesalamine (ASACOL HD) 800 MG EC tablet Take 3 tablets (2,400 mg) by mouth 2 times daily   3/1/2019 at Unknown time     milrinone (PRIMACOR) infusion 200 mcg/mL PREMIX Inject 6.6375 mcg/min into the vein continuous 100 mL 3 Past Month at Unknown time     montelukast (SINGULAIR) 10 MG tablet Take 1 tablet (10 mg) by mouth At Bedtime   3/1/2019 at Unknown time     omeprazole (PRILOSEC) 40 MG capsule Take 1 capsule (40 mg) by mouth daily In pm   Past Month at Unknown time     ondansetron (ZOFRAN-ODT) 4 MG ODT tab Take 1 tablet (4 mg) by mouth every 8 hours as needed for nausea   Past Week at Unknown time     potassium chloride ER (K-DUR/KLOR-CON M) 20 MEQ CR tablet Take 40 mg in the am and 20 mg in the afternoon. 180 tablet 3 2/28/2019 at Unknown time     spironolactone (ALDACTONE) 25 MG tablet Take 25 mg by mouth daily   Past Month at Unknown time     tamsulosin (FLOMAX) 0.4 MG capsule Take 1 capsule (0.4 mg) by mouth daily (with dinner)   Past Month at Unknown time     warfarin (COUMADIN) 5 MG tablet Take 2.5 mg on Sun/Thurs and 5 mg on all other days of the week   Past Month at Unknown time     BETA BLOCKER NOT PRESCRIBED, INTENTIONAL, Beta Blocker  "not prescribed intentionally due to Recent tx with IV positive inotropic agent (Patient not taking: Reported on 1/30/2019)  0 Not Taking            Physical Exam:   BP 92/52   Pulse 96   Temp 96.9  F (36.1  C) (Axillary)   Resp 17   Ht 1.727 m (5' 8\")   Wt 55.4 kg (122 lb 2.2 oz)   SpO2 100%   BMI 18.57 kg/m    Vitals:    03/12/19 0400 03/13/19 0400 03/14/19 0600   Weight: 59.8 kg (131 lb 13.4 oz) 57.5 kg (126 lb 12.2 oz) 55.4 kg (122 lb 2.2 oz)     General: Appears chronically ill, sitting in bed, in no acute distress.  Heme/Lymph: No overt bleeding. No cervical, axillary, or supraclavicular adenopathy.  Skin: No concerning lesions, rash, jaundice, cyanosis, erythema, or ecchymoses on exposed surfaces.  Dressing CDI on sternum and R neck.   HEENT: NCAT. anicteric sclera. Oral mucosa pink and moist with no lesions or thrush.  Respiratory: Non-labored breathing, good air exchange, lungs clear to auscultation bilaterally.  Cardiovascular: Regular rate and rhythm. No murmur or rub.   Gastrointestinal: Normoactive bowel sounds. Abdomen soft, non-distended, and non-tender. No palpable masses or organomegaly.  Extremities: trace b/l LE edema   Neurologic: A&O x 3, speech normal, linear thought processes, CNII-XII grossly intact          Data:   I have personally reviewed the following labs/imaging:  CBC  Recent Labs   Lab 03/14/19  0450 03/14/19  0329 03/13/19  1635 03/13/19  1545 03/13/19  1000   WBC  --  15.4* 17.8* 17.1* 18.0*   RBC  --  2.35* 2.14* 2.10* 2.30*   HGB  --  7.1* 6.5* 6.3* 7.0*   HCT  --  22.5* 20.7* 20.4* 22.2*   MCV  --  96 97 97 97   MCH  --  30.2 30.4 30.0 30.4   MCHC  --  31.6 31.4* 30.9* 31.5   RDW  --  17.4* 16.8* 17.1* 16.8*   PLT 38* 40* 53* 47* 68*     CMP  Recent Labs   Lab 03/14/19  0329 03/13/19  1545 03/13/19  1000 03/13/19  0334    139 138 137   POTASSIUM 3.8 3.9 4.4 3.8   CHLORIDE 106 106 106 106   CO2 23 24 21 20   ANIONGAP 9 8 12 11   * 173* 150* 180*   BUN 33* 25 26 " 26   CR 1.68* 1.19 1.34* 1.36*   GFRESTIMATED 45* 68 59* 58*   GFRESTBLACK 52* 79 68 67   RADAMES 7.4* 7.6* 7.0* 8.1*   MAG 2.2 2.4* 2.4* 2.4*   PHOS 1.6* 2.2* 2.3* 2.7   PROTTOTAL 4.3* 4.5* 4.8* 4.7*   ALBUMIN 2.2* 2.2* 2.4* 2.4*   BILITOTAL 0.6 1.1 0.7 0.8   ALKPHOS 97 98 105 102   AST 23 28 28 30   ALT 23 24 25 24     INR  Recent Labs   Lab 03/11/19  0338   INR 1.50*   direct bili jacobs 0.4      Peripheral smear: reviewed by me.  Rare schistocytes (1 per 2-3 HPF). Numerous PMNs and intermittent bands.  Large RBC size variability likely due to trnasfusions.  Numerous large platelets.  No platelet clumps.

## 2019-03-14 NOTE — PLAN OF CARE
Pt with hx of HF, heart transplant 2/24, now s/p balloon pump with FRANKLIN requiring CRRT, off CRRT 3/13. Pt is alert and oriented, forgetful and occasionally disoriented to time. Follows command appropriately and is easily redirectable. Pain has mostly been in the abdomen; prn tylenol given x2. Dobutamine still at 2.5mcg, CVTS ok for maps to be around 60 during dialysis. 1.5L of fluid pulled out during dialysis, pt was not able to maintain map goal. Heparin restarted at 650 units/hr for neg HIT panel, recheck hep 10A at 1900. Pt on room air, chest tube with minimal output, dressing changed. Pt having watery loose stool, started on fiber packets. Pt using commode with an assist of 1-2; assist of 2 for line control/management. TF held this afternoon and to restart and run from 8pm - 8am starting tonight. Pt eating very minimal with each meal. Pt was able to call down for his own breakfast this morning. Pt oliguria, peeing 10-50cc each time, UA ordered, have not collected yet. BG checked this afternoon and was 207, CVTS updated, possibly starting sliding scale per CVTS, no orders at this time. Several labs ordered this afternoon, however pt was dialyzing, called CVTS if they still want the sample because it might be hemo concentrated, they ok to draw labs after dialysis. Phos replaced this morning, still need recheck this evening. Continue to monitor, if pt stable during and after dialysis, possibly transferring pt out of the ICU. Notify MD for any changes or concern.

## 2019-03-14 NOTE — PROGRESS NOTES
Nephrology Progress Note  March 14, 2019      Today Recommendations:  Plan for HD today   Recommend Peripheral smear and LD (eval for TMA in setting of CNI)    ASSESSMENT AND RECOMMENDATIONS:   Mr. Larios is a 53 year old male with history of chronic renal insufficiency, EtOH abuse, GIB w/splenic embolization, PAF s/p ablation and cardioversion, NICM c/b cardiogenic shock requiring IABP, s/p OHT (2/24/19) on tacrolimus and MMF, complicated by RV dysfunction per echo and acute oliguric kidney injury.      # Anuric FRANKLIN on CKD stage II-III:  Patient with history of chronic renal insufficiency and cortical scarring of the L kidney per Renal US 03/2016. Baseline Crt 1.1- 1.4 over the last 6 months; however, prior BMPs have demonstrated patient Crt as high as 2.0. He most likely has a baseline level of CKD due to renal artery stenosis (longstanding smoking history, abdominal bruit on physical examination) and possible smaller FRANKLIN episodes from HFrEF exacerbations. Current rise in creatinine started 3/1 at 1.89, and increasing to 4.01 3/7 and oliguria is most likely due to nephrotoxicity associated with tacrolimus (a renal vasoconstrictor), and diuretic use.  ----  At this time, urine output is picking up as BP has improved. However, still needs RRT for volume and clearance. Will plan for iHD today.  Given, 1 functional kidney, baseline CKD, recommend TDC for probable prolonged FRANKLIN recovery course.     Immunosuppressants:  MMF, Tacrolimus, managed by transplant team.      #Electrolytes. Acid-base:  Acceptable on RRT.     #Hypotension-Volume status:  Overall, on low dose Dobutamine with acceptable BP. Plan to pull UF as tolerated with goal Weight of ~54 kg.      #Anemia:  Hgb with drop along with Thrombocytopenia. Recommend eval for TMA given on CNI. HIT eval ongoing and Heparin discontinued. Transfusion per primary.    Recommendations were communicated to primary team via verbal communication.     Seen and discussed with  Dr. Paul.     JANET Herbert MD  [id number]    Interval History :   Nursing and provider notes from last 24 hours reviewed.  Yesterday, drop in Hgb req PRBC. Additionally, drop in PLT over past 3 days. Concern for HIT, Heparin  Discontinued. He remains alert and appropriately communicative. He is making a bit more urine. Tolerating TF. No fevers. Denies chest pain or abd pain.     MEDICATIONS:  Current Meds    sodium chloride 0.9%  250 mL Intravenous Once in dialysis     sodium chloride 0.9%  300 mL Hemodialysis Machine Once     B and C vitamin Complex with folic acid  5 mL Per Feeding Tube Daily     ceFEPIme (MAXIPIME) IV  2 g Intravenous Q24H     DULoxetine  20 mg Oral Daily     fiber modular (NUTRISOURCE FIBER)  1 packet Per Feeding Tube Q6H     fluticasone  2 puff Inhalation Q12H     gelatin absorbable  1 each Topical During Hemodialysis (from stock)     levothyroxine  100 mcg Oral Daily     lidocaine  1 patch Transdermal Q24H     lidocaine   Transdermal Q8H     lidocaine   Transdermal Q24h     melatonin  3 mg Oral At Bedtime     mesalamine  2,400 mg Oral BID     montelukast  10 mg Oral At Bedtime     mycophenolate  1,000 mg Oral or Feeding Tube BID    And     mycophenolate  1,000 mg Oral BID     - MEDICATION INSTRUCTIONS -   Does not apply Once     nystatin  500,000 Units Oral 4x Daily     predniSONE  15 mg Oral BID w/meals     ranitidine  150 mg Oral Daily     rosuvastatin  10 mg Oral Daily     senna-docusate  1 tablet Oral BID    Or     senna-docusate  2 tablet Oral BID     sildenafil  20 mg Oral TID     sodium chloride (PF)  3 mL Intracatheter Q8H     sodium chloride (PF)  9 mL Intracatheter During Hemodialysis (from stock)     sodium chloride (PF)  9 mL Intracatheter During Hemodialysis (from stock)     sodium phosphate monobasic  4 mg% Hemodialysis Machine Once     terbutaline  10 mg Oral Q8H     [START ON 3/16/2019] valGANciclovir  450 mg Oral or Feeding Tube Once per day on Tue Sat     Infusion Meds    IV  "fluid REPLACEMENT ONLY       DOBUTamine 2.5 mcg/kg/min (19 1000)     HEParin       - MEDICATION INSTRUCTIONS -       - MEDICATION INSTRUCTIONS -       BETA BLOCKER NOT PRESCRIBED       REVIEW OF SYSTEMS:  A comprehensive of systems was not obtained due to mental status.      PHYSICAL EXAM:   Temp  Av.9  F (36.6  C)  Min: 95.9  F (35.5  C)  Max: 100  F (37.8  C)  Arterial Line BP  Min: 68/56  Max: 182/36  Arterial Line BP 2  Min: 90/54  Max: 107/56  Arterial Line MAP (mmHg)  Av.7 mmHg  Min: 57 mmHg  Max: 123 mmHg Arterial Line Location 2: Right femoral    Pulse  Av.5  Min: 59  Max: 140 Resp  Av.1  Min: 8  Max: 45  FiO2 (%)  Av %  Min: 40 %  Max: 100 %  SpO2  Av.1 %  Min: 88 %  Max: 100 %    CVP (mmHg): 21 mmHg  BP 91/57   Pulse 96   Temp 97.6  F (36.4  C) (Oral)   Resp 16   Ht 1.727 m (5' 8\")   Wt 55.4 kg (122 lb 2.2 oz)   SpO2 98%   BMI 18.57 kg/m     Date 19 0700 - 19 0659   Shift 6176-1499 8608-7165 9984-0968 24 Hour Total   INTAKE   P.O. 100   100   I.V. 70.4   70.4   Shift Total(mL/kg) 170.4(2.74)   170.4(2.74)   OUTPUT   Chest Tube(mL/kg) 238(3.83)   238(3.83)   Shift Total(mL/kg) 238(3.83)   238(3.83)   Weight (kg) 62.1 62.1 62.1 62.1      Admit Weight: 63.5 kg (140 lb 1.6 oz)     GENERAL APPEARANCE: Alert  Lymphatics: no cervical  LAD  HEENT: NGT in place, sclerae anicteric  Pulmonary: lungs clear to auscultation with equal breath sounds bilaterally  CV: regular rhythm, normal rate   - Edema: Trace B/L LE  GI: soft, nontender, normal bowel sounds.  SKIN: no rash, warm  NEURO: face symmetric, AO x 3    LABS:   I personally reviewed the labs.     CMP  Recent Labs   Lab 19  0329 19  1545 19  1000 19  0334    139 138 137   POTASSIUM 3.8 3.9 4.4 3.8   CHLORIDE 106 106 106 106   CO2 23 24 21 20   ANIONGAP 9 8 12 11   * 173* 150* 180*   BUN 33* 25 26 26   CR 1.68* 1.19 1.34* 1.36*   GFRESTIMATED 45* 68 59* 58*   GFRESTBLACK " 52* 79 68 67   RADAMES 7.4* 7.6* 7.0* 8.1*   MAG 2.2 2.4* 2.4* 2.4*   PHOS 1.6* 2.2* 2.3* 2.7   PROTTOTAL 4.3* 4.5* 4.8* 4.7*   ALBUMIN 2.2* 2.2* 2.4* 2.4*   BILITOTAL 0.6 1.1 0.7 0.8   ALKPHOS 97 98 105 102   AST 23 28 28 30   ALT 23 24 25 24     CBC  Recent Labs   Lab 03/14/19  0450 03/14/19  0329 03/13/19  1635 03/13/19  1545 03/13/19  1000   HGB  --  7.1* 6.5* 6.3* 7.0*   WBC  --  15.4* 17.8* 17.1* 18.0*   RBC  --  2.35* 2.14* 2.10* 2.30*   HCT  --  22.5* 20.7* 20.4* 22.2*   MCV  --  96 97 97 97   MCH  --  30.2 30.4 30.0 30.4   MCHC  --  31.6 31.4* 30.9* 31.5   RDW  --  17.4* 16.8* 17.1* 16.8*   PLT 38* 40* 53* 47* 68*     INR  Recent Labs   Lab 03/11/19  0338   INR 1.50*     ABG  Recent Labs   Lab 03/11/19  1152 03/10/19  1214 03/09/19  0400 03/08/19  2147   O2PER 21 21 21 21.0      URINE STUDIES  Recent Labs   Lab Test 03/04/19  1303 02/23/19  1921 02/18/19  0957 11/15/18  1000   COLOR Yellow Yellow Yellow Yellow   APPEARANCE Clear Clear Clear Clear   URINEGLC Negative Negative Negative Negative   URINEBILI Negative Negative Negative Negative   URINEKETONE Negative Negative Negative Negative   SG 1.014 1.008 1.008 1.015   UBLD Negative Negative Negative Negative   URINEPH 5.0 6.0 5.5 5.0   PROTEIN Negative Negative Negative Negative   NITRITE Negative Negative Negative Negative   LEUKEST Negative Negative Negative Negative   RBCU 0 <1 <1 <1   WBCU 0 <1 1 1     No lab results found.  PTH  No lab results found.  IRON STUDIES  Recent Labs   Lab Test 11/15/18  0955 10/11/18  1235   IRON  --  63   FEB  --  457*   IRONSAT  --  14*   NATE 1,045*  --        IMAGING:  Reviewed    I, Abhijeet Paul, saw this patient on 03/14/2019 with Dr. Herbert and agree with the findings and plan of care as documented in the note.

## 2019-03-14 NOTE — PLAN OF CARE
Discharge Planner OT   Patient plan for discharge: rehab  Current status: Intermittent confusion, limited activity tolerance. Completing simple ADLs from chair with SBA  Barriers to return to prior living situation: pain, cognition, deconditioning, medical status  Recommendations for discharge: TCU  Rationale for recommendations: Pt well below baseline strength and endurance for ADLs, safety concerns       Entered by: Felisha Reynolds 03/14/2019 8:53 AM

## 2019-03-14 NOTE — PROGRESS NOTES
CVTS PROGRESS NOTE  March 14, 2019      CO-MORBIDITIES:   Chronic systolic heart failure (H)  (primary encounter diagnosis)  Acute on chronic systolic congestive heart failure (H)    ASSESSMENT: Everton Larios is a 53 year old male with PMH etoh abuse, hypothyroidism, intermittent asthma, ulcerative colitis, Depression, Chronic HFrEF, NICM admitted with cardiogenic shock requiring subclavian balloon pump now s/p OHT 2/24/19 with Piyush House and Christopher.      TODAY'S PROGRESS:   Cycle tube feeds  Per cards: HIT panel, holding heparin gtt, zosyn switched to cefepime due to thrombocytopenia    f/u HIT panel and restart heparin if negative  iHD today, if he tolerates iHD then transfer to floor  Tunneled dialysis line early next week    Heme/on consult  Worsening anemia/thrombocytopenia: 1 units pRBC and 1 PLT overnight    PLAN:  Neuro/ pain/ sedation:  - Monitor neurological status. Notify the MD for any acute changes in exam.  - Tylenol prn for pain. Hold narcotics for altered mental status.    #ICU delirium, slowly improving  - Head CT 3/9 negative  - Optimize sleep/wake cycle, melatonin for sleep      #Depression  - PTA cymbalta 20mg     Pulmonary care:   - Doing well on room air  - Supp oxygen to maintain SpO2 >92%  - Encourage IS and deep breathing  - Bilateral pleural chest tubes to suction.  to eval output.    #Intermittent asthma  - PTA Montelukast ordered    Cardiovascular: HR goal >95. Echo 3/5 with good LV function, mild decreased RV function (moderately dilated).  - R heart cath and biopsy 3/4 and 3/11, biopsies negative for rejection.  - Monitor hemodynamic status.   - MAP >65. HR goal > 95.  - Dobutamine 2.5 mcg/kg/min, sildenafil 20 q8h, terbutaline 10 mg q8h  - Continue statin. No aspirin per cards.   - Pacing wires removed 3/13.     GI care:   #Severe malnutrition  - Regular diet  - NJ tube feeds for nutritional supplementation, will cycle  - Bowel regimen PRN.   - Fiber given diarrhea,  pills seen in stool are acceptable, still think absorption is adequate thus far    #Intraperitoneal free air (CT obtained 2/26)   - Small peritoneal defect made during redo operation, closed with stitches intra-op- likely source of free air. Resolved.    #Pierce's esophagus  #Ulcerative colitis  -PTA Mesalamine     Fluids/ Electrolytes/ Nutrition:   - TKO for IV fluid hydration  - TF for enteral nutritional support as above.  - No indication for parenteral nutrition.  - Replace electrolytes as needed, replace phos today  #Hyponatremia  - Asymptomatic/improved on CRRT    Renal/ Fluid Balance:   #FRANKLIN, oliguric.    - Etiology likely cardiorenal from venous congestion vs nephrotoxicity from multiple medications  - CRRT started (3/9), will do trial of iHD today. Nephrology following, appreciate recommendations  - Will continue to monitor intake and output.  - PTA Tamsulosin      Endocrine:    # Stress hyperglycemia  -SSI  # hypothyroidism  - Home levothyroxine      ID/ Antibiotics: Perioperative antibiotics vanc/zosyn x 3 days- completed  #Leukocytosis, persistent since OR 2/24, afebrile   - BCx 3/9 x2 NGTD, CDiff 3/10/2019 negative  - BC and swan tip cultured 3/4 for leukocytosis- NGTD.   - Empirically started on Vanc (3/9-3/11). Zosyn (3/9- ) until 3/16. Zosyn switched to cefepime due to thrombocytopenia.  - Valcyte for CMV prophylaxis; holding bactrim for PCP prophylaxis. Pentamidine given on 3/4.   - Nystatin for thrush     Heme:     #Bypass of persistent left SVC to R atrial appendage with [place] graft. Will require lifelong anti-coagulation for graft  - Hgb goal >7. Daily CBC.  - Hgb 6.8 3/9, transfused 1 u pRBC, stable 3/10/2019, no s/s bleeding. 1 units pRBC and 1 plt for Hgb 6.3 and PLT 38K respectively. Worsening thrombocytopenia.   - Low intensity heparin gtt--currently held until HIT panel results    Prophylaxis:    - Mechanical prophylaxis for DVT  - Heparin as above  - Protonix qd     Lines/ tubes/  drains:  - RIJ HD line 3/8, LUE PICC 3/9, L pleural chest tubes x2      Disposition:  - CV ICU.     Patient seen, findings and plan discussed with CVTS fellow    Fely Nice MD  Anesthesiology Fellow PGY5  ====================================    SUBJECTIVE:   Mental status slowly improving, sleeping more at night  Tolerating fluid removal on CRRT -2L yesterday  Diarrhea worsening and some pills in his stools    OBJECTIVE:   1. VITAL SIGNS:   Temp:  [97  F (36.1  C)-98.8  F (37.1  C)] 97.6  F (36.4  C)  Heart Rate:  [] 100  Resp:  [12-26] 16  BP: ()/(49-91) 91/57  SpO2:  [94 %-100 %] 98 %  Resp: 16      2. INTAKE/ OUTPUT:   I/O last 3 completed shifts:  In: 3645.95 [P.O.:600; I.V.:805.95; NG/GT:620]  Out: 2719 [Urine:310; Other:2013; Chest Tube:396]    3. PHYSICAL EXAMINATION:     General: Sitting up in chair this morning, alert, in NAD  Neuro: Oriented to person and place, moves all four extremities spontaneously   Resp: NLB on RA  CV: Regular rate, regular rhythm, serous output from b/l pleural chest tubes  Right chest wall hematoma stable, minimal ecchymosis   Abdomen: Soft, mildly distended, non-tender  Incisions: c/d/i, no erythema or drainage  Extremities: warm and well perfused     4. INVESTIGATIONS:   Arterial Blood Gases   No lab results found in last 7 days.  Complete Blood Count   Recent Labs   Lab 03/14/19  0450 03/14/19  0329 03/13/19  1635 03/13/19  1545 03/13/19  1000   WBC  --  15.4* 17.8* 17.1* 18.0*   HGB  --  7.1* 6.5* 6.3* 7.0*   PLT 38* 40* 53* 47* 68*     Basic Metabolic Panel  Recent Labs   Lab 03/14/19  0329 03/13/19  1545 03/13/19  1000 03/13/19  0334    139 138 137   POTASSIUM 3.8 3.9 4.4 3.8   CHLORIDE 106 106 106 106   CO2 23 24 21 20   BUN 33* 25 26 26   CR 1.68* 1.19 1.34* 1.36*   * 173* 150* 180*     Liver Function Tests  Recent Labs   Lab 03/14/19  0329 03/13/19  1545 03/13/19  1000 03/13/19  0334  03/11/19  0338   AST 23 28 28 30   < > 29   ALT 23 24 25  24   < > 22   ALKPHOS 97 98 105 102   < > 102   BILITOTAL 0.6 1.1 0.7 0.8   < > 0.9   ALBUMIN 2.2* 2.2* 2.4* 2.4*   < > 2.8*   INR  --   --   --   --   --  1.50*    < > = values in this interval not displayed.     Pancreatic Enzymes  No lab results found in last 7 days.  Coagulation Profile  Recent Labs   Lab 03/11/19  0338   INR 1.50*         5. RADIOLOGY:   No results found for this or any previous visit (from the past 24 hour(s)).    =========================================

## 2019-03-14 NOTE — PROGRESS NOTES
HEMODIALYSIS TREATMENT NOTE    Date: 3/14/2019  Time: 2:32 PM    Data:  Pre Wt: 55.4 kg     Desired Wt: 53.4 kg   Post Wt: 53.9 kg (calculated)   Weight gain:   kg   Weight change: 1.5 kg  Ultrafiltration - Post Run Net Total Removed (mL): 1500   Ultrafiltration - Post Run Net Total Gain (mL):    Vascular Access Status: Yes, secured and visible  Dialyzer Rinse: streaked, light   Total Blood Volume Processed:  53.3.  Total Dialysis (Treatment) Time: 3 hours     Lab:   Yes; hepatitis B antigen & antibody drawn pre-tx.    Interventions:  3 hour HD treatment completed per [facility] CVC on 3K3Ca dialysate w/ sodium phosphate 4 mg% added to bicarbonate. Attempted to pull 2 kg fluid but d/t MAP below goal, fluid pull decreased to 1.5 kg. Pt tolerated 1.5 kg pull. Machine temperature further decreased to 35.5 at same time as UF goal decreased in effort to increase MAP. Pt tolerated HD treatment fairly well; MAP increased w/ decreased temperature and UF goal. Handoff given to STEWART Mcbride RN.    Assessment:  Awake and alert @ times alternating w/ periods of sleeping. Pt oriented x 4 but often struggles to find the correct words. Lungs clear in upper lobes, diminished in bases. O2 sats upper 90s-100 on room air. Monitor sinus rhythm to sinus tachycardia, rate 90s-100s. RIJ CVC dressing CDI.     Plan:    Further HD per renal team.

## 2019-03-14 NOTE — CONSULTS
Patient is on IR schedule 3/20/2019 for a Double lumen tunneled hemodialysis line placement   Labs WNL for procedure. INR must be 1.8 Platelets  50,000 or better  Orders for NPO, scrubs and antibiotics have been entered.   Consent will be done prior to procedure.   Started dialysis 3/14/2019 for acute renal  Injury, will check for recovery before proceeding with tunneled central line placement      Please contact the IR charge RN at [phone] for estimated time of procedure.       Vidhi Frey IR RPA  [phone] Call pager  [phone] pager

## 2019-03-14 NOTE — PLAN OF CARE
D/I: Patient on unit 4A Surgical/Neuro ICU   Neuro- Alert. Orientation waxes/wanes. Observed speaking to himself. Impulsive at times. Forgetful. PERRL. Moving all ext purposefully with equal strength. Afebrile.  CV- HR and BP stable. Tele: ST with occasional PVCs. 1 unit PRBCs completed.  Pulm- Satting high 90s on RA. LS clear/dim.  GI- BS hyperactive. Loose watery stool x2  - Voiding small amounts frequently per urinal. Urine is clear dilia.  Gtts- Dobutamin at 2.5mcg/kg.min. Heparin drip stopped  Skin- Ecchymotic. Dressings cdi. Chest tubes x2 - serous output - see flowsheets for OP  Pain- PRN tylenol effective for surgical site pain  IV's - R PICC infusing. L internal jugular saline locked. L PIV saline locked.  MD notified regarding low platelets of 38 this am (lab was repeated to confirm). Ordered to stop heparin, ordered HIT screen. Also notified of Phos 1.6 - no intervention at this time - deferred to day team.  See flow sheets for further interventions and assessments.   A: Stable   P: Continue to monitor pt closely. Notify MD of significant changes

## 2019-03-15 ENCOUNTER — APPOINTMENT (OUTPATIENT)
Dept: GENERAL RADIOLOGY | Facility: CLINIC | Age: 54
DRG: 001 | End: 2019-03-15
Attending: STUDENT IN AN ORGANIZED HEALTH CARE EDUCATION/TRAINING PROGRAM
Payer: COMMERCIAL

## 2019-03-15 ENCOUNTER — APPOINTMENT (OUTPATIENT)
Dept: PHYSICAL THERAPY | Facility: CLINIC | Age: 54
DRG: 001 | End: 2019-03-15
Attending: INTERNAL MEDICINE
Payer: COMMERCIAL

## 2019-03-15 ENCOUNTER — APPOINTMENT (OUTPATIENT)
Dept: OCCUPATIONAL THERAPY | Facility: CLINIC | Age: 54
DRG: 001 | End: 2019-03-15
Attending: INTERNAL MEDICINE
Payer: COMMERCIAL

## 2019-03-15 LAB
ABO + RH BLD: NORMAL
ABO + RH BLD: NORMAL
ALBUMIN SERPL-MCNC: 2.3 G/DL (ref 3.4–5)
ALP SERPL-CCNC: 88 U/L (ref 40–150)
ALT SERPL W P-5'-P-CCNC: 24 U/L (ref 0–70)
ANION GAP SERPL CALCULATED.3IONS-SCNC: 10 MMOL/L (ref 3–14)
AST SERPL W P-5'-P-CCNC: 20 U/L (ref 0–45)
BACTERIA SPEC CULT: NO GROWTH
BACTERIA SPEC CULT: NO GROWTH
BASOPHILS # BLD AUTO: 0 10E9/L (ref 0–0.2)
BASOPHILS NFR BLD AUTO: 0.1 %
BILIRUB SERPL-MCNC: 0.8 MG/DL (ref 0.2–1.3)
BLD GP AB SCN SERPL QL: NORMAL
BLD PROD TYP BPU: NORMAL
BLD PROD TYP BPU: NORMAL
BLD UNIT ID BPU: 0
BLOOD BANK CMNT PATIENT-IMP: NORMAL
BLOOD PRODUCT CODE: NORMAL
BPU ID: NORMAL
BUN SERPL-MCNC: 18 MG/DL (ref 7–30)
CALCIUM SERPL-MCNC: 7.4 MG/DL (ref 8.5–10.1)
CHLORIDE SERPL-SCNC: 105 MMOL/L (ref 94–109)
CO2 SERPL-SCNC: 22 MMOL/L (ref 20–32)
COPATH REPORT: NORMAL
CREAT SERPL-MCNC: 1.44 MG/DL (ref 0.66–1.25)
DIFFERENTIAL METHOD BLD: ABNORMAL
EOSINOPHIL # BLD AUTO: 0 10E9/L (ref 0–0.7)
EOSINOPHIL NFR BLD AUTO: 0 %
ERYTHROCYTE [DISTWIDTH] IN BLOOD BY AUTOMATED COUNT: 17.1 % (ref 10–15)
GFR SERPL CREATININE-BSD FRML MDRD: 54 ML/MIN/{1.73_M2}
GLUCOSE BLDC GLUCOMTR-MCNC: 122 MG/DL (ref 70–99)
GLUCOSE BLDC GLUCOMTR-MCNC: 190 MG/DL (ref 70–99)
GLUCOSE SERPL-MCNC: 136 MG/DL (ref 70–99)
HAPTOGLOB SERPL-MCNC: 143 MG/DL (ref 15–200)
HCT VFR BLD AUTO: 25.8 % (ref 40–53)
HGB BLD-MCNC: 8.3 G/DL (ref 13.3–17.7)
IMM GRANULOCYTES # BLD: 0.1 10E9/L (ref 0–0.4)
IMM GRANULOCYTES NFR BLD: 0.7 %
LMWH PPP CHRO-ACNC: 0.3 IU/ML
LMWH PPP CHRO-ACNC: 0.36 IU/ML
LYMPHOCYTES # BLD AUTO: 0.4 10E9/L (ref 0.8–5.3)
LYMPHOCYTES NFR BLD AUTO: 2.1 %
Lab: NORMAL
Lab: NORMAL
MCH RBC QN AUTO: 30.4 PG (ref 26.5–33)
MCHC RBC AUTO-ENTMCNC: 32.2 G/DL (ref 31.5–36.5)
MCV RBC AUTO: 95 FL (ref 78–100)
MONOCYTES # BLD AUTO: 0.3 10E9/L (ref 0–1.3)
MONOCYTES NFR BLD AUTO: 1.7 %
NEUTROPHILS # BLD AUTO: 16.5 10E9/L (ref 1.6–8.3)
NEUTROPHILS NFR BLD AUTO: 95.4 %
NRBC # BLD AUTO: 0 10*3/UL
NRBC BLD AUTO-RTO: 0 /100
NUM BPU REQUESTED: 2
PHOSPHATE SERPL-MCNC: 2.7 MG/DL (ref 2.5–4.5)
PLATELET # BLD AUTO: 37 10E9/L (ref 150–450)
POTASSIUM SERPL-SCNC: 3.6 MMOL/L (ref 3.4–5.3)
PROT SERPL-MCNC: 4.4 G/DL (ref 6.8–8.8)
RBC # BLD AUTO: 2.73 10E12/L (ref 4.4–5.9)
RETICS # AUTO: 100.8 10E9/L (ref 25–95)
RETICS/RBC NFR AUTO: 4.3 % (ref 0.5–2)
SODIUM SERPL-SCNC: 137 MMOL/L (ref 133–144)
SPECIMEN EXP DATE BLD: NORMAL
SPECIMEN SOURCE: NORMAL
SPECIMEN SOURCE: NORMAL
TACROLIMUS BLD-MCNC: 4.9 UG/L (ref 5–15)
TME LAST DOSE: ABNORMAL H
TRANSFUSION STATUS PATIENT QL: NORMAL
TRANSFUSION STATUS PATIENT QL: NORMAL
WBC # BLD AUTO: 16.7 10E9/L (ref 4–11)

## 2019-03-15 PROCEDURE — 85520 HEPARIN ASSAY: CPT | Performed by: STUDENT IN AN ORGANIZED HEALTH CARE EDUCATION/TRAINING PROGRAM

## 2019-03-15 PROCEDURE — 25000132 ZZH RX MED GY IP 250 OP 250 PS 637: Performed by: SURGERY

## 2019-03-15 PROCEDURE — 71045 X-RAY EXAM CHEST 1 VIEW: CPT

## 2019-03-15 PROCEDURE — P9016 RBC LEUKOCYTES REDUCED: HCPCS | Performed by: THORACIC SURGERY (CARDIOTHORACIC VASCULAR SURGERY)

## 2019-03-15 PROCEDURE — 25000132 ZZH RX MED GY IP 250 OP 250 PS 637: Performed by: INTERNAL MEDICINE

## 2019-03-15 PROCEDURE — 25000132 ZZH RX MED GY IP 250 OP 250 PS 637: Performed by: STUDENT IN AN ORGANIZED HEALTH CARE EDUCATION/TRAINING PROGRAM

## 2019-03-15 PROCEDURE — 20000004 ZZH R&B ICU UMMC

## 2019-03-15 PROCEDURE — 00000146 ZZHCL STATISTIC GLUCOSE BY METER IP

## 2019-03-15 PROCEDURE — 85027 COMPLETE CBC AUTOMATED: CPT | Performed by: PHYSICIAN ASSISTANT

## 2019-03-15 PROCEDURE — 84100 ASSAY OF PHOSPHORUS: CPT | Performed by: STUDENT IN AN ORGANIZED HEALTH CARE EDUCATION/TRAINING PROGRAM

## 2019-03-15 PROCEDURE — 25000131 ZZH RX MED GY IP 250 OP 636 PS 637: Performed by: HOSPITALIST

## 2019-03-15 PROCEDURE — 85520 HEPARIN ASSAY: CPT | Performed by: PHYSICIAN ASSISTANT

## 2019-03-15 PROCEDURE — 80197 ASSAY OF TACROLIMUS: CPT | Performed by: INTERNAL MEDICINE

## 2019-03-15 PROCEDURE — 99233 SBSQ HOSP IP/OBS HIGH 50: CPT | Mod: GC | Performed by: INTERNAL MEDICINE

## 2019-03-15 PROCEDURE — 25000131 ZZH RX MED GY IP 250 OP 636 PS 637: Performed by: INTERNAL MEDICINE

## 2019-03-15 PROCEDURE — 25000125 ZZHC RX 250: Performed by: INTERNAL MEDICINE

## 2019-03-15 PROCEDURE — 97530 THERAPEUTIC ACTIVITIES: CPT | Mod: GP

## 2019-03-15 PROCEDURE — 97535 SELF CARE MNGMENT TRAINING: CPT | Mod: GO | Performed by: OCCUPATIONAL THERAPIST

## 2019-03-15 PROCEDURE — 25000128 H RX IP 250 OP 636: Performed by: STUDENT IN AN ORGANIZED HEALTH CARE EDUCATION/TRAINING PROGRAM

## 2019-03-15 PROCEDURE — 25000128 H RX IP 250 OP 636: Performed by: ANESTHESIOLOGY

## 2019-03-15 PROCEDURE — 80053 COMPREHEN METABOLIC PANEL: CPT | Performed by: STUDENT IN AN ORGANIZED HEALTH CARE EDUCATION/TRAINING PROGRAM

## 2019-03-15 PROCEDURE — 97116 GAIT TRAINING THERAPY: CPT | Mod: GP

## 2019-03-15 PROCEDURE — 25000132 ZZH RX MED GY IP 250 OP 250 PS 637: Performed by: ANESTHESIOLOGY

## 2019-03-15 RX ORDER — NICOTINE POLACRILEX 4 MG
15-30 LOZENGE BUCCAL
Status: DISCONTINUED | OUTPATIENT
Start: 2019-03-15 | End: 2019-03-29

## 2019-03-15 RX ORDER — DEXTROSE MONOHYDRATE 25 G/50ML
25-50 INJECTION, SOLUTION INTRAVENOUS
Status: DISCONTINUED | OUTPATIENT
Start: 2019-03-15 | End: 2019-03-29

## 2019-03-15 RX ADMIN — Medication 500000 UNITS: at 20:08

## 2019-03-15 RX ADMIN — DULOXETINE 20 MG: 20 CAPSULE, DELAYED RELEASE ORAL at 07:42

## 2019-03-15 RX ADMIN — MYCOPHENOLATE MOFETIL 1000 MG: 200 POWDER, FOR SUSPENSION ORAL at 07:43

## 2019-03-15 RX ADMIN — LEVOTHYROXINE SODIUM 100 MCG: 100 TABLET ORAL at 07:42

## 2019-03-15 RX ADMIN — LIDOCAINE 1 PATCH: 560 PATCH PERCUTANEOUS; TOPICAL; TRANSDERMAL at 20:08

## 2019-03-15 RX ADMIN — MESALAMINE 2400 MG: 800 TABLET, DELAYED RELEASE ORAL at 21:25

## 2019-03-15 RX ADMIN — Medication 500000 UNITS: at 07:43

## 2019-03-15 RX ADMIN — TACROLIMUS 1.5 MG: 1 CAPSULE ORAL at 17:57

## 2019-03-15 RX ADMIN — SILDENAFIL 20 MG: 20 TABLET ORAL at 15:00

## 2019-03-15 RX ADMIN — FLUTICASONE PROPIONATE 2 PUFF: 220 AEROSOL, METERED RESPIRATORY (INHALATION) at 20:00

## 2019-03-15 RX ADMIN — MESALAMINE 2400 MG: 800 TABLET, DELAYED RELEASE ORAL at 07:44

## 2019-03-15 RX ADMIN — SILDENAFIL 20 MG: 20 TABLET ORAL at 07:42

## 2019-03-15 RX ADMIN — Medication 1 PACKET: at 07:45

## 2019-03-15 RX ADMIN — PREDNISONE 15 MG: 10 TABLET ORAL at 17:57

## 2019-03-15 RX ADMIN — PREDNISONE 15 MG: 10 TABLET ORAL at 07:42

## 2019-03-15 RX ADMIN — MONTELUKAST SODIUM 10 MG: 10 TABLET, COATED ORAL at 21:24

## 2019-03-15 RX ADMIN — Medication 500000 UNITS: at 12:24

## 2019-03-15 RX ADMIN — TACROLIMUS 1.5 MG: 1 CAPSULE ORAL at 07:42

## 2019-03-15 RX ADMIN — HEPARIN SODIUM 800 UNITS/HR: 10000 INJECTION, SOLUTION INTRAVENOUS at 15:08

## 2019-03-15 RX ADMIN — MYCOPHENOLATE MOFETIL 1000 MG: 200 POWDER, FOR SUSPENSION ORAL at 20:02

## 2019-03-15 RX ADMIN — Medication 1 PACKET: at 17:57

## 2019-03-15 RX ADMIN — Medication 500000 UNITS: at 15:01

## 2019-03-15 RX ADMIN — FLUTICASONE PROPIONATE 2 PUFF: 220 AEROSOL, METERED RESPIRATORY (INHALATION) at 07:45

## 2019-03-15 RX ADMIN — RANITIDINE 150 MG: 150 TABLET ORAL at 07:42

## 2019-03-15 RX ADMIN — Medication 1 PACKET: at 12:24

## 2019-03-15 RX ADMIN — CEFEPIME 2 G: 2 INJECTION, POWDER, FOR SOLUTION INTRAVENOUS at 17:58

## 2019-03-15 RX ADMIN — SILDENAFIL 20 MG: 20 TABLET ORAL at 20:10

## 2019-03-15 RX ADMIN — MELATONIN TAB 3 MG 3 MG: 3 TAB at 20:10

## 2019-03-15 RX ADMIN — ROSUVASTATIN CALCIUM 10 MG: 10 TABLET, FILM COATED ORAL at 07:42

## 2019-03-15 RX ADMIN — Medication 5 ML: at 07:43

## 2019-03-15 ASSESSMENT — ACTIVITIES OF DAILY LIVING (ADL)
ADLS_ACUITY_SCORE: 14
ADLS_ACUITY_SCORE: 15
ADLS_ACUITY_SCORE: 14

## 2019-03-15 NOTE — PLAN OF CARE
D/I: Hx of heart tx on 2/24, CRRT d/yo on 3/13, and 2 chest tubes pulled today.  Neuro: Alert and oriented, at times makes some forgetful statements, occasionally delirious. Follows commands, easily redirectable.   Respiratory: On RA, LS clear.   GI: 3x loose watery bowel movements, whole pills and some powder evidenced in stool. MD aware. 2L fluid restriction, 2 gram sodium diet. NG in place with bolus feeds occurring at night.   : Unmeasurable urine output, typically oliguric. On HD dialysis.   Vitals: VSS. Afebrile.   Activity/pain: Decrease in pain, more positional in shoulder. Up with standby assist.  Continue to monitor, notify MD with any concerns.

## 2019-03-15 NOTE — PROGRESS NOTES
CV ICU PROGRESS NOTE  March 15, 2019      CO-MORBIDITIES:   Chronic systolic heart failure (H)  (primary encounter diagnosis)  Acute on chronic systolic congestive heart failure (H)    ASSESSMENT: Everton Larios is a 53 year old male with PMH etoh abuse, hypothyroidism, intermittent asthma, ulcerative colitis, Depression, Chronic HFrEF, NICM admitted with cardiogenic shock requiring subclavian balloon pump now s/p OHT 2/24/19 with Piyush House and Christopher.      TODAY'S PROGRESS:   Cycle tube feeds tonight  Start calorie counts today  Add sliding scale insulin given cycled tube feeds  Nephrology planning TTS iHD schedule  Tunneled HD line to be placed 3/20  7 day course of empiric antibiotics to end today  Remove bilateral chest tubes today  Transfer to floor when bed available    PLAN:  Neuro/ pain/ sedation:  - Monitor neurological status. Notify the MD for any acute changes in exam.  - Tylenol prn for pain.      #ICU delirium, improving  - Head CT 3/9 negative  - Optimize sleep/wake cycle, melatonin for sleep      #Depression  - PTA cymbalta 20mg     Pulmonary care:   - Doing well on room air  - Supp oxygen to maintain SpO2 >92%  - Encourage IS and deep breathing  - Will remove bilateral chest tubes today.    #Intermittent asthma  - PTA Montelukast ordered    Cardiovascular: HR goal >95. Echo 3/5 with good LV function, mild decreased RV function (moderately dilated).  - R heart cath and biopsy 3/4 and 3/11, biopsies negative for rejection.  - Monitor hemodynamic status.   - MAP >65. HR goal > 95.  - Dobutamine 2.5 mcg/kg/min, sildenafil 20 q8h, terbutaline 10 mg q8h  - Continue statin. No aspirin per cards.   - Pacing wires removed 3/13.     GI care:   #Severe malnutrition  - Regular diet  - Cycle TF.  - Calorie counts.  - Fiber given diarrhea.   - Bowel regimen PRN.     #Intraperitoneal free air (CT obtained 2/26)   - Small peritoneal defect made during redo operation, closed with stitches intra-op- likely  source of free air. Resolved.    #Pierce's esophagus  #Ulcerative colitis  -PTA Mesalamine     Fluids/ Electrolytes/ Nutrition:   - TKO for IV fluid hydration  - TF for enteral nutritional support as above.  - No indication for parenteral nutrition.  - Replace electrolytes as needed   #Hyponatremia  - Asymptomatic/improved on CRRT    Renal/ Fluid Balance:   #FRANKLIN, oliguric.    - Etiology likely cardiorenal from venous congestion vs nephrotoxicity from multiple medications  - CRRT started (3/9), transitioned to iHD 3/14. Nephrology following, appreciate recommendations  - Plan for TTS iHD schedule  - Will continue to monitor intake and output.  - PTA Tamsulosin      Endocrine:    # Stress hyperglycemia  -SSI  # hypothyroidism  - Home levothyroxine      ID/ Antibiotics: Perioperative antibiotics vanc/zosyn x 3 days- completed  #Leukocytosis, persistent since OR 2/24, afebrile   - BCx 3/9 x2 NGTD, CDiff 3/10/2019 negative  - BC and swan tip cultured 3/4 for leukocytosis- NGTD.   - Empirically started on Vanc (3/9-3/11). Zosyn (3/9-3/14), switched to cefepime (3/14-3/15) due to thrombocytopenia. Antibiotics completed 3/15.  - holding Valcyte for CMV prophylaxis; holding bactrim for PCP prophylaxis. Pentamidine given on 3/4.   - Nystatin for thrush     Heme:     #Bypass of persistent left SVC to R atrial appendage with [place] graft. Will require lifelong anti-coagulation for graft  - Low intensity heparin gtt  #Anemia, hemolysis unlikely, peripheral smear unremarkable  - Hgb goal >7. Daily CBC.  - Hgb 6.9- transfused 1u pRBC overnight, no s/s bleeding  #Thrombocytopenia, drug induced vs marrow suppression, HIT negative  - Heme consult, appreciate recs.   - Zosyn stopped, Valcyte held.  - Plt 37K, will transfuse if <30K, sign of bleeding or impending procedure.  - If Plt <20K, will send post-transfusion purpura screen and IVIG x 2 days per heme.    Prophylaxis:    - Mechanical prophylaxis for DVT  - Heparin as above  -  Protonix qd     Lines/ tubes/ drains:  - RIJ HD line 3/8, LUE PICC 3/9, L pleural chest tubes x2      Disposition:  - CV ICU.     Patient seen, findings and plan discussed with CV ICU staff Dr. Sesay.    Leyla Bowling MD  General Surgery PGY-3  ====================================    SUBJECTIVE:   Mental status slowly improving, intermittently forgetful  Tolerated iHD without hypotension  Eating minimally, poor appetite  Watery stools    OBJECTIVE:   1. VITAL SIGNS:   Temp:  [96.7  F (35.9  C)-98.5  F (36.9  C)] 98.5  F (36.9  C)  Heart Rate:  [] 99  Resp:  [14-20] 18  BP: ()/(44-72) 108/56  Cuff Mean (mmHg):  [59-79] 79  SpO2:  [97 %-100 %] 100 %  Resp: 18      2. INTAKE/ OUTPUT:   I/O last 3 completed shifts:  In: 2522.65 [P.O.:570; I.V.:587.65; NG/GT:280]  Out: 2230 [Urine:180; Other:1500; Stool:300; Chest Tube:250]    3. PHYSICAL EXAMINATION:     General: Resting in bed, alert, NAD  Neuro: Oriented to person and place, moves all four extremities spontaneously   Resp: NLB on RA  CV: Regular rate, regular rhythm, serous output from b/l pleural chest tubes  Right chest wall hematoma stable, minimal ecchymosis   Abdomen: Soft, mildly distended, non-tender  Incisions: c/d/i, no erythema or drainage  Extremities: warm and well perfused     4. INVESTIGATIONS:   Arterial Blood Gases   No lab results found in last 7 days.  Complete Blood Count   Recent Labs   Lab 03/15/19  0800 03/14/19  2116 03/14/19  0450 03/14/19  0329 03/13/19  1635   WBC 16.7* 16.6*  --  15.4* 17.8*   HGB 8.3* 6.9*  --  7.1* 6.5*   PLT 37* 38* 38* 40* 53*     Basic Metabolic Panel  Recent Labs   Lab 03/15/19  0409 03/14/19 2116 03/14/19 0329 03/13/19  1545    137 137 139   POTASSIUM 3.6 3.9 3.8 3.9   CHLORIDE 105 105 106 106   CO2 22 23 23 24   BUN 18 15 33* 25   CR 1.44* 1.16 1.68* 1.19   * 103* 197* 173*     Liver Function Tests  Recent Labs   Lab 03/15/19  0409 03/14/19 2116 03/14/19  1653 03/14/19 0329  03/13/19  1545 03/13/19  1000  03/11/19  0338   AST 20  --   --  23 28 28   < > 29   ALT 24  --   --  23 24 25   < > 22   ALKPHOS 88  --   --  97 98 105   < > 102   BILITOTAL 0.8  --   --  0.6 1.1 0.7   < > 0.9   ALBUMIN 2.3*  --   --  2.2* 2.2* 2.4*   < > 2.8*   INR  --  1.33* 1.36*  --   --   --   --  1.50*    < > = values in this interval not displayed.     Pancreatic Enzymes  No lab results found in last 7 days.  Coagulation Profile  Recent Labs   Lab 03/14/19  2116 03/14/19  1653 03/11/19 0338   INR 1.33* 1.36* 1.50*   PTT  --  46*  --          5. RADIOLOGY:   No results found for this or any previous visit (from the past 24 hour(s)).    =========================================

## 2019-03-15 NOTE — PROGRESS NOTES
CVTS PROGRESS NOTE  March 15, 2019      CO-MORBIDITIES:   Chronic systolic heart failure (H)  (primary encounter diagnosis)  Acute on chronic systolic congestive heart failure (H)    ASSESSMENT: Everton Larios is a 53 year old male with PMH etoh abuse, hypothyroidism, intermittent asthma, ulcerative colitis, Depression, Chronic HFrEF, NICM admitted with cardiogenic shock requiring subclavian balloon pump now s/p OHT 2/24/19 with Piyush House and Christopher.      TODAY'S PROGRESS:   Cycle tube feeds tonight  Start calorie counts today  Add sliding scale insulin given cycled tube feeds  Nephrology planning TTS iHD schedule  Tunneled HD line to be placed 3/20  7 day course of empiric antibiotics to end today  Remove bilateral chest tubes today  Transfer to floor when bed available    PLAN:  Neuro/ pain/ sedation:  - Monitor neurological status. Notify the MD for any acute changes in exam.  - Tylenol prn for pain.      #ICU delirium, improving  - Head CT 3/9 negative  - Optimize sleep/wake cycle, melatonin for sleep      #Depression  - PTA cymbalta 20mg     Pulmonary care:   - Doing well on room air  - Supp oxygen to maintain SpO2 >92%  - Encourage IS and deep breathing  - Will remove bilateral chest tubes today.    #Intermittent asthma  - PTA Montelukast ordered    Cardiovascular: HR goal >95. Echo 3/5 with good LV function, mild decreased RV function (moderately dilated).  - R heart cath and biopsy 3/4 and 3/11, biopsies negative for rejection.  - Monitor hemodynamic status.   - MAP >65. HR goal > 95.  - Dobutamine 2.5 mcg/kg/min, sildenafil 20 q8h, terbutaline 10 mg q8h  - Continue statin. No aspirin per cards.   - Pacing wires removed 3/13.     GI care:   #Severe malnutrition  - Regular diet  - Cycle TF.  - Calorie counts.  - Fiber given diarrhea.   - Bowel regimen PRN.     #Intraperitoneal free air (CT obtained 2/26)   - Small peritoneal defect made during redo operation, closed with stitches intra-op- likely  source of free air. Resolved.    #Pierce's esophagus  #Ulcerative colitis  -PTA Mesalamine     Fluids/ Electrolytes/ Nutrition:   - TKO for IV fluid hydration  - TF for enteral nutritional support as above.  - No indication for parenteral nutrition.  - Replace electrolytes as needed   #Hyponatremia  - Asymptomatic/improved on CRRT    Renal/ Fluid Balance:   #FRANKLIN, oliguric.    - Etiology likely cardiorenal from venous congestion vs nephrotoxicity from multiple medications  - CRRT started (3/9), transitioned to iHD 3/14. Nephrology following, appreciate recommendations  - Plan for TTS iHD schedule  - Will continue to monitor intake and output.  - PTA Tamsulosin      Endocrine:    # Stress hyperglycemia  -SSI  # hypothyroidism  - Home levothyroxine      ID/ Antibiotics: Perioperative antibiotics vanc/zosyn x 3 days- completed  #Leukocytosis, persistent since OR 2/24, afebrile   - BCx 3/9 x2 NGTD, CDiff 3/10/2019 negative  - BC and swan tip cultured 3/4 for leukocytosis- NGTD.   - Empirically started on Vanc (3/9-3/11). Zosyn (3/9-3/14), switched to cefepime (3/14-3/15) due to thrombocytopenia. Antibiotics completed 3/15.  - holding Valcyte for CMV prophylaxis; holding bactrim for PCP prophylaxis. Pentamidine given on 3/4.   - Nystatin for thrush     Heme:     #Bypass of persistent left SVC to R atrial appendage with [place] graft. Will require lifelong anti-coagulation for graft  - Low intensity heparin gtt  #Anemia, hemolysis unlikely, peripheral smear unremarkable  - Hgb goal >7. Daily CBC.  - Hgb 6.9- transfused 1u pRBC overnight, no s/s bleeding  #Thrombocytopenia, drug induced vs marrow suppression, HIT negative  - Heme consult, appreciate recs.   - Zosyn stopped, Valcyte held.  - Plt 37K, will transfuse if <30K, sign of bleeding or impending procedure.  - If Plt <20K, will send post-transfusion purpura screen and IVIG x 2 days per heme.    Prophylaxis:    - Mechanical prophylaxis for DVT  - Heparin as above  -  Protonix qd     Lines/ tubes/ drains:  - RIJ HD line 3/8, LUE PICC 3/9, L pleural chest tubes x2      Disposition:  - CV ICU.     Patient seen, findings and plan discussed with CVTS fellow.    Leyla Bowling MD  General Surgery PGY-3  ====================================    SUBJECTIVE:   Mental status slowly improving, intermittently forgetful  Tolerated iHD without hypotension  Eating minimally, poor appetite  Watery stools    OBJECTIVE:   1. VITAL SIGNS:   Temp:  [96.7  F (35.9  C)-98.5  F (36.9  C)] 98.5  F (36.9  C)  Heart Rate:  [] 99  Resp:  [14-20] 18  BP: ()/(44-72) 108/56  Cuff Mean (mmHg):  [59-79] 79  SpO2:  [97 %-100 %] 100 %  Resp: 18      2. INTAKE/ OUTPUT:   I/O last 3 completed shifts:  In: 2522.65 [P.O.:570; I.V.:587.65; NG/GT:280]  Out: 2230 [Urine:180; Other:1500; Stool:300; Chest Tube:250]    3. PHYSICAL EXAMINATION:     General: Resting in bed, alert, NAD  Neuro: Oriented to person and place, moves all four extremities spontaneously   Resp: NLB on RA  CV: Regular rate, regular rhythm, serous output from b/l pleural chest tubes  Right chest wall hematoma stable, minimal ecchymosis   Abdomen: Soft, mildly distended, non-tender  Incisions: c/d/i, no erythema or drainage  Extremities: warm and well perfused     4. INVESTIGATIONS:   Arterial Blood Gases   No lab results found in last 7 days.  Complete Blood Count   Recent Labs   Lab 03/15/19  0800 03/14/19  2116 03/14/19  0450 03/14/19  0329 03/13/19  1635   WBC 16.7* 16.6*  --  15.4* 17.8*   HGB 8.3* 6.9*  --  7.1* 6.5*   PLT 37* 38* 38* 40* 53*     Basic Metabolic Panel  Recent Labs   Lab 03/15/19  0409 03/14/19 2116 03/14/19  0329 03/13/19  1545    137 137 139   POTASSIUM 3.6 3.9 3.8 3.9   CHLORIDE 105 105 106 106   CO2 22 23 23 24   BUN 18 15 33* 25   CR 1.44* 1.16 1.68* 1.19   * 103* 197* 173*     Liver Function Tests  Recent Labs   Lab 03/15/19  0409 03/14/19 2116 03/14/19  1653 03/14/19  0329 03/13/19  1545  03/13/19  1000  03/11/19  0338   AST 20  --   --  23 28 28   < > 29   ALT 24  --   --  23 24 25   < > 22   ALKPHOS 88  --   --  97 98 105   < > 102   BILITOTAL 0.8  --   --  0.6 1.1 0.7   < > 0.9   ALBUMIN 2.3*  --   --  2.2* 2.2* 2.4*   < > 2.8*   INR  --  1.33* 1.36*  --   --   --   --  1.50*    < > = values in this interval not displayed.     Pancreatic Enzymes  No lab results found in last 7 days.  Coagulation Profile  Recent Labs   Lab 03/14/19  2116 03/14/19  1653 03/11/19 0338   INR 1.33* 1.36* 1.50*   PTT  --  46*  --          5. RADIOLOGY:   No results found for this or any previous visit (from the past 24 hour(s)).    =========================================

## 2019-03-15 NOTE — PROGRESS NOTES
"SPIRITUAL HEALTH SERVICES  SPIRITUAL ASSESSMENT Progress Note  [facility] 4A     REFERRAL SOURCE: Follow up from 3/8.    Visited pt who was seated in his chair. I introduced myself as the unit , but the pt said, \"I am busy,\" and showed no interest for further communication.     PLAN: No follow up needed at this time. [facility] remains available for support if the need arise.    Geri Berman  Chaplain Resident  Pager  [phone]    "

## 2019-03-15 NOTE — PLAN OF CARE
PT 4A / Discharge Planner PT   Patient plan for discharge: not discussed today  Current status: Patient continues to be limited by anxiety, cognition improving and demonstrating improved tolerance for therapy today. Sit<>stand with min Ax1-2. Ambulated 3 bouts (20ft + 30ft + 50ft) with FWW + CGA/min A. Mild MATA, SpO2>94% on RA.   Barriers to return to prior living situation: medical status, cognition, deconditioning, weakness, level of assist  Recommendations for discharge: ARU  Rationale for recommendations: Patient is well below baseline for functional mobility and ADLs, would benefit from intensive therapy at ARU to increase strength, balance, activity tolerance and functional independence.       Entered by: Juan Jose Sharma 03/15/2019 5:10 PM

## 2019-03-15 NOTE — PROGRESS NOTES
ADVANCED HEART FAILURE PROGRESS NOTE    SUBJECTIVE:  No acute events.  No major events overnight.  Tolerated iHD yesterday.    ROS otherwise negative.    OBJECTIVE:  Vital signs:  Temp: 96.7  F (35.9  C) Temp  Min: 96.7  F (35.9  C)  Max: 98  F (36.7  C)  Heart Rate: 96 Heart Rate  Min: 96  Max: 103  BP: 93/54 Systolic (24hrs), Av , Min:79 , Max:115   Diastolic (24hrs), Av, Min:44, Max:72    Resp: 20 Resp  Min: 12  Max: 20  SpO2: 98 % SpO2  Min: 94 %  Max: 100 %    Intake/Output Summary (Last 24 hours) at 3/15/2019 0642  Last data filed at 3/15/2019 0500  Gross per 24 hour   Intake 2522.65 ml   Output 2230 ml   Net 292.65 ml       Vitals:    19 0400 19 0400 19 0600   Weight: 59.8 kg (131 lb 13.4 oz) 57.5 kg (126 lb 12.2 oz) 55.4 kg (122 lb 2.2 oz)     Physical Exam:  GEN:  no distress, sitting in chair  CV:  Regular rate and rhythm, no murmur.   CHEST: Subclavian incision sutured.  Chest tubes.  LUNGS:  On room air, normal work of breathing, Clear to auscultation bilaterally, no wheezes or crackles.   ABD:  Active bowel sounds, soft, non-tender/non-distended.  No rebound/guarding/rigidity.  EXT:  No edema or cyanosis. Normal distal puses  NEURO: no focal deficits    Medications:    IV fluid REPLACEMENT ONLY       DOBUTamine 2.5 mcg/kg/min (03/15/19 0500)     HEParin 800 Units/hr (03/15/19 0500)     - MEDICATION INSTRUCTIONS -       - MEDICATION INSTRUCTIONS -       BETA BLOCKER NOT PRESCRIBED         Current Facility-Administered Medications   Medication Dose Route Frequency     B and C vitamin Complex with folic acid  5 mL Per Feeding Tube Daily     ceFEPIme (MAXIPIME) IV  2 g Intravenous Q24H     DULoxetine  20 mg Oral Daily     fiber modular (NUTRISOURCE FIBER)  1 packet Per Feeding Tube Q6H     fluticasone  2 puff Inhalation Q12H     levothyroxine  100 mcg Oral Daily     lidocaine  1 patch Transdermal Q24H     lidocaine   Transdermal Q8H     lidocaine   Transdermal Q24h     melatonin  3  mg Oral At Bedtime     mesalamine  2,400 mg Oral BID     montelukast  10 mg Oral At Bedtime     mycophenolate  1,000 mg Oral or Feeding Tube BID    And     mycophenolate  1,000 mg Oral BID     nystatin  500,000 Units Oral 4x Daily     predniSONE  15 mg Oral BID w/meals     ranitidine  150 mg Oral Daily     rosuvastatin  10 mg Oral Daily     senna-docusate  1 tablet Oral BID    Or     senna-docusate  2 tablet Oral BID     sildenafil  20 mg Oral TID     sodium chloride (PF)  3 mL Intracatheter Q8H     tacrolimus  1.5 mg Oral BID IS       Labs:  CMP  Recent Labs   Lab 03/15/19  0409 03/14/19 2116 03/14/19  1653 03/14/19  0329 03/13/19  1545  03/10/19  0410    137  --  137 139   < > 133   POTASSIUM 3.6 3.9  --  3.8 3.9   < > 4.6   CHLORIDE 105 105  --  106 106   < > 102   CO2 22 23  --  23 24   < > 18*   BUN 18 15  --  33* 25   < > 58*   CR 1.44* 1.16  --  1.68* 1.19   < > 2.61*   RADAMES 7.4* 7.6*  --  7.4* 7.6*   < > 8.0*   MAG  --   --   --  2.2 2.4*   < > 2.7*   PHOS 2.7  --   --  1.6* 2.2*   < > 5.8*   * 103*  --  197* 173*   < > 88   LDH  --   --  320*  --   --   --  276*   ALKPHOS 88  --   --  97 98   < > 104   BILITOTAL 0.8  --   --  0.6 1.1   < > 0.8   AST 20  --   --  23 28   < > 22   ALT 24  --   --  23 24   < > 19    < > = values in this interval not displayed.     BLOOD GASNo lab results found in last 7 days.  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 Post-op complicated by RV dysfunction and FRANKLIN.      #Oliguric renal failure:  Etiology cardiorenal from venous congestion vs. nephrotoxins.  - iHD per renal  - nephrology consulted, appreciate assistance     #Sepsis  -  changed to cefepime from zosyn to finish 7 day course (ending 3/16/19)  - cultures no growth to date     #S/p OHT on 2/24:  Graft function:  - echo 3/1 shows normal LV function, RV is mild to moderately dilated and hypokinetic  - dobutamine at 2.5 for continued RV and hemodynamic support, continue this while we attempt to get him euvolemic and then plan to wean down the dobutamine  - continue sildenafil 20 q8h    #Thrombocytopenia/Anemia:  Per hematology this is felt to be most likely medication induced  - Changed to cefepime from zosyn     Immunosuppression:  - on MMF decreased at 1000 BID  - Prednisone 15mg BID, decreasing by 5mg daily with negative biopsies  - NOT on Simulect protocol     Prophylaxis:  - CMV +/+: medium risk, Valcyte held given thrombocytopenia/anemia  - PCP: holding Bactrim for now, pentamadine given 3/3  - Thrush: improved on nystatin  - GI: on PPI  - CAV: Crestor, holding ASA given bleeding at line sites     Biopsies:  - First biopsy 3/4, 0R, no AMR  - Second biopsy 3/11, 0R, no AMR  - Third biopsy due 3/18     Goretex conduit for persistent left SVC:  - will need anticoagulation, on low intensity heparin  - NO CENTRAL VENOUS CATHETERS ON THE LEFT UPPER BODY      Seen and staffed with .     Santino Haque MD  Cardiology

## 2019-03-15 NOTE — PROGRESS NOTES
Nephrology Progress Note  March 15, 2019      Today Recommendations:  Plan for HD 3/16  Recommend TDC placement next week    ASSESSMENT AND RECOMMENDATIONS:   Mr. Larios is a 53 year old male with history of chronic renal insufficiency, EtOH abuse, GIB w/splenic embolization, PAF s/p ablation and cardioversion, NICM c/b cardiogenic shock requiring IABP, s/p OHT (2/24/19) on tacrolimus and MMF, complicated by RV dysfunction per echo and acute oliguric kidney injury.      # Anuric FRANKLIN on CKD stage II-III:  Patient with history of chronic renal insufficiency and cortical scarring of the L kidney per Renal US 03/2016. Baseline Crt 1.1- 1.4 over the last 6 months; however, prior BMPs have demonstrated patient Crt as high as 2.0. He most likely has a baseline level of CKD due to renal artery stenosis (longstanding smoking history, abdominal bruit on physical examination) and possible smaller FRANKLIN episodes from HFrEF exacerbations. Current rise in creatinine started 3/1 at 1.89, and increasing to 4.01 3/7 and oliguria is most likely due to nephrotoxicity associated with tacrolimus (a renal vasoconstrictor), and diuretic use.  ----  At this time, remains oliguric. Will plan RRT per TTS schedule at this time. Appreciate plans for TDC placement.    Immunosuppressants:  MMF, Tacrolimus, managed by transplant team.      #Electrolytes. Acid-base:  Acceptable.     #Hypotension-Volume status:  On low dose Dobutamine with acceptable BP. Plan to pull UF as tolerated with goal Weight of ~54 kg.      #Anemia  #Thrombocytopenia:  Hgb with drop along with Thrombocytopenia. Heme/Onc on board. Smear and Hapto pending, LD somewhat elevated. HIT neg.  Abx switched from PTZ to Cefepime.    Recommendations were communicated to primary team via this note.     Seen and discussed with Dr. Singh.     JANET Herbert MD  [phone]    Interval History :   Nursing and provider notes from last 24 hours reviewed.  NAEON. Feeling well this AM. Most alert  I've seen him. Appropriate and communicating. Denies chest pain or SOB. Does endorse NGT discomfort. Trying to eat more so he can get NGT out. No obvious bleeding. Making a small amount of urine.     MEDICATIONS:  Current Meds    B and C vitamin Complex with folic acid  5 mL Per Feeding Tube Daily     ceFEPIme (MAXIPIME) IV  2 g Intravenous Q24H     DULoxetine  20 mg Oral Daily     fiber modular (NUTRISOURCE FIBER)  1 packet Per Feeding Tube Q6H     fluticasone  2 puff Inhalation Q12H     insulin aspart  1-7 Units Subcutaneous TID AC     insulin aspart  1-5 Units Subcutaneous At Bedtime     levothyroxine  100 mcg Oral Daily     lidocaine  1 patch Transdermal Q24H     lidocaine   Transdermal Q8H     lidocaine   Transdermal Q24h     melatonin  3 mg Oral At Bedtime     mesalamine  2,400 mg Oral BID     montelukast  10 mg Oral At Bedtime     mycophenolate  1,000 mg Oral or Feeding Tube BID    And     mycophenolate  1,000 mg Oral BID     nystatin  500,000 Units Oral 4x Daily     predniSONE  15 mg Oral BID w/meals     ranitidine  150 mg Oral Daily     rosuvastatin  10 mg Oral Daily     senna-docusate  1 tablet Oral BID    Or     senna-docusate  2 tablet Oral BID     sildenafil  20 mg Oral TID     sodium chloride (PF)  3 mL Intracatheter Q8H     tacrolimus  1.5 mg Oral BID IS     Infusion Meds    IV fluid REPLACEMENT ONLY       DOBUTamine 2.5 mcg/kg/min (03/15/19 0500)     HEParin 800 Units/hr (03/15/19 0500)     - MEDICATION INSTRUCTIONS -       - MEDICATION INSTRUCTIONS -       BETA BLOCKER NOT PRESCRIBED       REVIEW OF SYSTEMS:  A comprehensive of systems was not obtained due to mental status.      PHYSICAL EXAM:   Temp  Av.9  F (36.6  C)  Min: 95.9  F (35.5  C)  Max: 100  F (37.8  C)  Arterial Line BP  Min: 68/56  Max: 182/36  Arterial Line BP 2  Min: 90/54  Max: 107/56  Arterial Line MAP (mmHg)  Av.7 mmHg  Min: 57 mmHg  Max: 123 mmHg Arterial Line Location 2: Right femoral    Pulse  Av.5  Min: 59  Max:  "140 Resp  Av.1  Min: 8  Max: 45  FiO2 (%)  Av %  Min: 40 %  Max: 100 %  SpO2  Av.1 %  Min: 88 %  Max: 100 %    CVP (mmHg): 21 mmHg  /56 (BP Location: Right arm)   Pulse 96   Temp 98.5  F (36.9  C) (Axillary)   Resp 18   Ht 1.727 m (5' 8\")   Wt 55.4 kg (122 lb 2.2 oz)   SpO2 100%   BMI 18.57 kg/m     Date 19 0700 - 19 0659   Shift 0145-4453 8267-5791 0789-9657 24 Hour Total   INTAKE   P.O. 100   100   I.V. 70.4   70.4   Shift Total(mL/kg) 170.4(2.74)   170.4(2.74)   OUTPUT   Chest Tube(mL/kg) 238(3.83)   238(3.83)   Shift Total(mL/kg) 238(3.83)   238(3.83)   Weight (kg) 62.1 62.1 62.1 62.1      Admit Weight: 63.5 kg (140 lb 1.6 oz)     GENERAL APPEARANCE: Alert  Lymphatics: no cervical  LAD  HEENT: NGT in place, sclerae anicteric  Pulmonary: lungs clear to auscultation with equal breath sounds bilaterally  CV: regular rhythm, normal rate   - Edema: None  GI: soft, nontender, normal bowel sounds.  SKIN: no rash, warm  NEURO: face symmetric, AO x 3    LABS:   I personally reviewed the labs.     CMP  Recent Labs   Lab 03/15/19  0409 19  2116 19  0329 19  1545 19  1000 19  0334    137 137 139 138 137   POTASSIUM 3.6 3.9 3.8 3.9 4.4 3.8   CHLORIDE 105 105 106 106 106 106   CO2 22 23 23 24 21 20   ANIONGAP 10 9 9 8 12 11   * 103* 197* 173* 150* 180*   BUN 18 15 33* 25 26 26   CR 1.44* 1.16 1.68* 1.19 1.34* 1.36*   GFRESTIMATED 54* 70 45* 68 59* 58*   GFRESTBLACK 62 81 52* 79 68 67   RADAMES 7.4* 7.6* 7.4* 7.6* 7.0* 8.1*   MAG  --   --  2.2 2.4* 2.4* 2.4*   PHOS 2.7  --  1.6* 2.2* 2.3* 2.7   PROTTOTAL 4.4*  --  4.3* 4.5* 4.8* 4.7*   ALBUMIN 2.3*  --  2.2* 2.2* 2.4* 2.4*   BILITOTAL 0.8  --  0.6 1.1 0.7 0.8   ALKPHOS 88  --  97 98 105 102   AST 20  --  23 28 28 30   ALT 24  --  23 24 25 24     CBC  Recent Labs   Lab 03/15/19  0800 19  2116 19  0450 19  0329 19  1635   HGB 8.3* 6.9*  --  7.1* 6.5*   WBC 16.7* 16.6*  --  " 15.4* 17.8*   RBC 2.73* 2.34*  --  2.35* 2.14*   HCT 25.8* 22.1*  --  22.5* 20.7*   MCV 95 94  --  96 97   MCH 30.4 29.5  --  30.2 30.4   MCHC 32.2 31.2*  --  31.6 31.4*   RDW 17.1* 17.4*  --  17.4* 16.8*   PLT 37* 38* 38* 40* 53*     INR  Recent Labs   Lab 03/14/19  2116 03/14/19  1653 03/11/19  0338   INR 1.33* 1.36* 1.50*   PTT  --  46*  --      ABG  Recent Labs   Lab 03/11/19  1152 03/10/19  1214 03/09/19  0400 03/08/19  2147   O2PER 21 21 21 21.0      URINE STUDIES  Recent Labs   Lab Test 03/04/19  1303 02/23/19  1921 02/18/19  0957 11/15/18  1000   COLOR Yellow Yellow Yellow Yellow   APPEARANCE Clear Clear Clear Clear   URINEGLC Negative Negative Negative Negative   URINEBILI Negative Negative Negative Negative   URINEKETONE Negative Negative Negative Negative   SG 1.014 1.008 1.008 1.015   UBLD Negative Negative Negative Negative   URINEPH 5.0 6.0 5.5 5.0   PROTEIN Negative Negative Negative Negative   NITRITE Negative Negative Negative Negative   LEUKEST Negative Negative Negative Negative   RBCU 0 <1 <1 <1   WBCU 0 <1 1 1     No lab results found.  PTH  No lab results found.  IRON STUDIES  Recent Labs   Lab Test 11/15/18  0955 10/11/18  1235   IRON  --  63   FEB  --  457*   IRONSAT  --  14*   NATE 1,045*  --        IMAGING:  Reviewed

## 2019-03-15 NOTE — PLAN OF CARE
Discharge Planner OT   Patient plan for discharge: not stated   Current status: Pt completed cognitive tasks w/ increase cognitive functioning. OT modified utensils to increase ind in self feeding and . VSS throughout.   Barriers to return to prior living situation: medical status  Recommendations for discharge: ARU  Rationale for recommendations: to increase ind in ADLs/IADLs.       Entered by: Kat Staton 03/15/2019 3:08 PM

## 2019-03-16 LAB
ANION GAP SERPL CALCULATED.3IONS-SCNC: 11 MMOL/L (ref 3–14)
BUN SERPL-MCNC: 35 MG/DL (ref 7–30)
CALCIUM SERPL-MCNC: 7.7 MG/DL (ref 8.5–10.1)
CHLORIDE SERPL-SCNC: 108 MMOL/L (ref 94–109)
CO2 SERPL-SCNC: 19 MMOL/L (ref 20–32)
CREAT SERPL-MCNC: 1.79 MG/DL (ref 0.66–1.25)
ERYTHROCYTE [DISTWIDTH] IN BLOOD BY AUTOMATED COUNT: 17.5 % (ref 10–15)
GFR SERPL CREATININE-BSD FRML MDRD: 41 ML/MIN/{1.73_M2}
GLUCOSE BLDC GLUCOMTR-MCNC: 117 MG/DL (ref 70–99)
GLUCOSE BLDC GLUCOMTR-MCNC: 134 MG/DL (ref 70–99)
GLUCOSE BLDC GLUCOMTR-MCNC: 175 MG/DL (ref 70–99)
GLUCOSE BLDC GLUCOMTR-MCNC: 229 MG/DL (ref 70–99)
GLUCOSE SERPL-MCNC: 221 MG/DL (ref 70–99)
HCT VFR BLD AUTO: 25.8 % (ref 40–53)
HGB BLD-MCNC: 8 G/DL (ref 13.3–17.7)
INR PPP: 1.25 (ref 0.86–1.14)
LMWH PPP CHRO-ACNC: 0.12 IU/ML
LMWH PPP CHRO-ACNC: 0.25 IU/ML
MAGNESIUM SERPL-MCNC: 1.8 MG/DL (ref 1.6–2.3)
MCH RBC QN AUTO: 29.3 PG (ref 26.5–33)
MCHC RBC AUTO-ENTMCNC: 31 G/DL (ref 31.5–36.5)
MCV RBC AUTO: 95 FL (ref 78–100)
PHOSPHATE SERPL-MCNC: 1.9 MG/DL (ref 2.5–4.5)
PLATELET # BLD AUTO: 40 10E9/L (ref 150–450)
POTASSIUM SERPL-SCNC: 3.5 MMOL/L (ref 3.4–5.3)
RBC # BLD AUTO: 2.73 10E12/L (ref 4.4–5.9)
SODIUM SERPL-SCNC: 138 MMOL/L (ref 133–144)
TACROLIMUS BLD-MCNC: 6.9 UG/L (ref 5–15)
TME LAST DOSE: NORMAL H
WBC # BLD AUTO: 15.7 10E9/L (ref 4–11)

## 2019-03-16 PROCEDURE — 83735 ASSAY OF MAGNESIUM: CPT | Performed by: STUDENT IN AN ORGANIZED HEALTH CARE EDUCATION/TRAINING PROGRAM

## 2019-03-16 PROCEDURE — 25000125 ZZHC RX 250: Performed by: INTERNAL MEDICINE

## 2019-03-16 PROCEDURE — 85610 PROTHROMBIN TIME: CPT | Performed by: STUDENT IN AN ORGANIZED HEALTH CARE EDUCATION/TRAINING PROGRAM

## 2019-03-16 PROCEDURE — 25000132 ZZH RX MED GY IP 250 OP 250 PS 637: Performed by: INTERNAL MEDICINE

## 2019-03-16 PROCEDURE — 90937 HEMODIALYSIS REPEATED EVAL: CPT

## 2019-03-16 PROCEDURE — 97530 THERAPEUTIC ACTIVITIES: CPT | Mod: GP

## 2019-03-16 PROCEDURE — 97116 GAIT TRAINING THERAPY: CPT | Mod: GP

## 2019-03-16 PROCEDURE — 80197 ASSAY OF TACROLIMUS: CPT | Performed by: INTERNAL MEDICINE

## 2019-03-16 PROCEDURE — 21400000 ZZH R&B CCU UMMC

## 2019-03-16 PROCEDURE — 25000128 H RX IP 250 OP 636: Performed by: INTERNAL MEDICINE

## 2019-03-16 PROCEDURE — 25000131 ZZH RX MED GY IP 250 OP 636 PS 637: Performed by: HOSPITALIST

## 2019-03-16 PROCEDURE — 80048 BASIC METABOLIC PNL TOTAL CA: CPT | Performed by: STUDENT IN AN ORGANIZED HEALTH CARE EDUCATION/TRAINING PROGRAM

## 2019-03-16 PROCEDURE — 25000132 ZZH RX MED GY IP 250 OP 250 PS 637: Performed by: SURGERY

## 2019-03-16 PROCEDURE — 85027 COMPLETE CBC AUTOMATED: CPT | Performed by: STUDENT IN AN ORGANIZED HEALTH CARE EDUCATION/TRAINING PROGRAM

## 2019-03-16 PROCEDURE — 25000131 ZZH RX MED GY IP 250 OP 636 PS 637: Performed by: INTERNAL MEDICINE

## 2019-03-16 PROCEDURE — 85520 HEPARIN ASSAY: CPT | Performed by: ANESTHESIOLOGY

## 2019-03-16 PROCEDURE — 25000132 ZZH RX MED GY IP 250 OP 250 PS 637: Performed by: NURSE PRACTITIONER

## 2019-03-16 PROCEDURE — 84100 ASSAY OF PHOSPHORUS: CPT | Performed by: STUDENT IN AN ORGANIZED HEALTH CARE EDUCATION/TRAINING PROGRAM

## 2019-03-16 PROCEDURE — 27210429 ZZH NUTRITION PRODUCT INTERMEDIATE LITER

## 2019-03-16 PROCEDURE — 00000146 ZZHCL STATISTIC GLUCOSE BY METER IP

## 2019-03-16 PROCEDURE — 25000132 ZZH RX MED GY IP 250 OP 250 PS 637: Performed by: ANESTHESIOLOGY

## 2019-03-16 PROCEDURE — 25000125 ZZHC RX 250: Performed by: STUDENT IN AN ORGANIZED HEALTH CARE EDUCATION/TRAINING PROGRAM

## 2019-03-16 PROCEDURE — 99233 SBSQ HOSP IP/OBS HIGH 50: CPT | Mod: GC | Performed by: INTERNAL MEDICINE

## 2019-03-16 PROCEDURE — 25000132 ZZH RX MED GY IP 250 OP 250 PS 637: Performed by: STUDENT IN AN ORGANIZED HEALTH CARE EDUCATION/TRAINING PROGRAM

## 2019-03-16 PROCEDURE — 85520 HEPARIN ASSAY: CPT | Performed by: STUDENT IN AN ORGANIZED HEALTH CARE EDUCATION/TRAINING PROGRAM

## 2019-03-16 PROCEDURE — 25000128 H RX IP 250 OP 636: Performed by: ANESTHESIOLOGY

## 2019-03-16 PROCEDURE — 25800030 ZZH RX IP 258 OP 636: Performed by: STUDENT IN AN ORGANIZED HEALTH CARE EDUCATION/TRAINING PROGRAM

## 2019-03-16 RX ORDER — MAGNESIUM SULFATE 1 G/100ML
2 INJECTION INTRAVENOUS
Status: DISCONTINUED | OUTPATIENT
Start: 2019-03-16 | End: 2019-03-16

## 2019-03-16 RX ORDER — MAGNESIUM SULFATE 1 G/100ML
2 INJECTION INTRAVENOUS ONCE
Status: DISCONTINUED | OUTPATIENT
Start: 2019-03-16 | End: 2019-03-16 | Stop reason: CLARIF

## 2019-03-16 RX ORDER — LOPERAMIDE HCL 2 MG
2 CAPSULE ORAL 2 TIMES DAILY PRN
Status: DISCONTINUED | OUTPATIENT
Start: 2019-03-16 | End: 2019-04-03 | Stop reason: HOSPADM

## 2019-03-16 RX ORDER — SODIUM PHOSPHATE,MONOBASIC
4 GRANULES (GRAM) MISCELLANEOUS ONCE
Status: COMPLETED | OUTPATIENT
Start: 2019-03-16 | End: 2019-03-16

## 2019-03-16 RX ORDER — MAGNESIUM SULFATE HEPTAHYDRATE 40 MG/ML
2 INJECTION, SOLUTION INTRAVENOUS ONCE
Status: COMPLETED | OUTPATIENT
Start: 2019-03-16 | End: 2019-03-16

## 2019-03-16 RX ADMIN — Medication 1 PACKET: at 18:06

## 2019-03-16 RX ADMIN — ACETAMINOPHEN 650 MG: 325 TABLET, FILM COATED ORAL at 21:35

## 2019-03-16 RX ADMIN — RANITIDINE 150 MG: 150 TABLET ORAL at 07:37

## 2019-03-16 RX ADMIN — SILDENAFIL 20 MG: 20 TABLET ORAL at 14:45

## 2019-03-16 RX ADMIN — Medication: at 11:09

## 2019-03-16 RX ADMIN — SENNOSIDES AND DOCUSATE SODIUM 1 TABLET: 8.6; 5 TABLET ORAL at 21:05

## 2019-03-16 RX ADMIN — TACROLIMUS 1.5 MG: 1 CAPSULE ORAL at 07:37

## 2019-03-16 RX ADMIN — FLUTICASONE PROPIONATE 2 PUFF: 220 AEROSOL, METERED RESPIRATORY (INHALATION) at 21:12

## 2019-03-16 RX ADMIN — Medication 4 MG%: at 11:24

## 2019-03-16 RX ADMIN — FLUTICASONE PROPIONATE 2 PUFF: 220 AEROSOL, METERED RESPIRATORY (INHALATION) at 07:38

## 2019-03-16 RX ADMIN — MESALAMINE 2400 MG: 800 TABLET, DELAYED RELEASE ORAL at 07:39

## 2019-03-16 RX ADMIN — LEVOTHYROXINE SODIUM 100 MCG: 100 TABLET ORAL at 07:37

## 2019-03-16 RX ADMIN — MYCOPHENOLATE MOFETIL 1000 MG: 200 POWDER, FOR SUSPENSION ORAL at 21:35

## 2019-03-16 RX ADMIN — MONTELUKAST SODIUM 10 MG: 10 TABLET, COATED ORAL at 21:35

## 2019-03-16 RX ADMIN — Medication 500000 UNITS: at 07:38

## 2019-03-16 RX ADMIN — ROSUVASTATIN CALCIUM 10 MG: 10 TABLET, FILM COATED ORAL at 07:37

## 2019-03-16 RX ADMIN — SILDENAFIL 20 MG: 20 TABLET ORAL at 21:04

## 2019-03-16 RX ADMIN — Medication 500000 UNITS: at 16:23

## 2019-03-16 RX ADMIN — MELATONIN TAB 3 MG 3 MG: 3 TAB at 21:04

## 2019-03-16 RX ADMIN — Medication 1 PACKET: at 06:09

## 2019-03-16 RX ADMIN — PREDNISONE 15 MG: 10 TABLET ORAL at 07:37

## 2019-03-16 RX ADMIN — Medication 500000 UNITS: at 11:46

## 2019-03-16 RX ADMIN — Medication 500000 UNITS: at 21:04

## 2019-03-16 RX ADMIN — MYCOPHENOLATE MOFETIL 1000 MG: 200 POWDER, FOR SUSPENSION ORAL at 07:38

## 2019-03-16 RX ADMIN — SILDENAFIL 20 MG: 20 TABLET ORAL at 07:37

## 2019-03-16 RX ADMIN — LIDOCAINE 1 PATCH: 560 PATCH PERCUTANEOUS; TOPICAL; TRANSDERMAL at 21:04

## 2019-03-16 RX ADMIN — Medication 1 PACKET: at 00:19

## 2019-03-16 RX ADMIN — TACROLIMUS 1.5 MG: 1 CAPSULE ORAL at 18:06

## 2019-03-16 RX ADMIN — POTASSIUM PHOSPHATE, MONOBASIC AND POTASSIUM PHOSPHATE, DIBASIC 20 MMOL: 224; 236 INJECTION, SOLUTION INTRAVENOUS at 09:07

## 2019-03-16 RX ADMIN — SODIUM CHLORIDE 300 ML: 9 INJECTION, SOLUTION INTRAVENOUS at 11:09

## 2019-03-16 RX ADMIN — SODIUM CHLORIDE 250 ML: 9 INJECTION, SOLUTION INTRAVENOUS at 11:09

## 2019-03-16 RX ADMIN — Medication 5 ML: at 07:38

## 2019-03-16 RX ADMIN — MAGNESIUM SULFATE HEPTAHYDRATE 2 G: 40 INJECTION, SOLUTION INTRAVENOUS at 22:41

## 2019-03-16 RX ADMIN — HEPARIN SODIUM 950 UNITS/HR: 10000 INJECTION, SOLUTION INTRAVENOUS at 13:23

## 2019-03-16 RX ADMIN — MESALAMINE 2400 MG: 800 TABLET, DELAYED RELEASE ORAL at 21:04

## 2019-03-16 RX ADMIN — Medication 1 PACKET: at 11:45

## 2019-03-16 RX ADMIN — DULOXETINE 20 MG: 20 CAPSULE, DELAYED RELEASE ORAL at 07:37

## 2019-03-16 RX ADMIN — PREDNISONE 15 MG: 10 TABLET ORAL at 18:06

## 2019-03-16 ASSESSMENT — ACTIVITIES OF DAILY LIVING (ADL)
ADLS_ACUITY_SCORE: 14

## 2019-03-16 ASSESSMENT — MIFFLIN-ST. JEOR
SCORE: 1361.5
SCORE: 1375.5

## 2019-03-16 NOTE — PROGRESS NOTES
Nephrology Progress Note  March 15, 2019      Today Recommendations:  Plan for HD today  Recommend TDC placement next week    ASSESSMENT AND RECOMMENDATIONS:   Mr. Larios is a 53 year old male with history of chronic renal insufficiency, EtOH abuse, GIB w/splenic embolization, PAF s/p ablation and cardioversion, NICM c/b cardiogenic shock requiring IABP, s/p OHT (2/24/19) on tacrolimus and MMF, complicated by RV dysfunction per echo and acute oliguric kidney injury.      # Anuric FRANKLIN on CKD stage II-III:  Patient with history of chronic renal insufficiency and cortical scarring of the L kidney per Renal US 03/2016. Baseline Crt 1.1- 1.4 over the last 6 months; however, prior BMPs have demonstrated patient Crt as high as 2.0. He most likely has a baseline level of CKD due to renal artery stenosis (longstanding smoking history, abdominal bruit on physical examination) and possible smaller FRANKLIN episodes from HFrEF exacerbations. Current rise in creatinine started 3/1 at 1.89, and increasing to 4.01 3/7 and oliguria is most likely due to nephrotoxicity associated with tacrolimus (a renal vasoconstrictor), and diuretic use.  ----  At this time, remains oliguric with poor clearance. Will plan RRT today per TTS schedule at this time. Appreciate plans for TDC placement.    Immunosuppressants:  MMF, Tacrolimus, managed by transplant team.      #Electrolytes. Acid-base:  Acceptable.     #Hypotension-Volume status:  On low dose Dobutamine with acceptable BP. Plan to pull UF as tolerated with goal Weight of ~54 kg.      #Anemia  #Thrombocytopenia:  Hgb and Plt stable at this time. Heme/Onc on board. Hapto normal and smear without hemolysis. HIT neg. Abx switched from PTZ to Cefepime.    Recommendations were communicated to primary team via this note.     Seen and discussed with Dr. Lee.     JANET Herbert MD  [phone]    Interval History :   Nursing and provider notes from last 24 hours reviewed.  NAEON. Feeling well this AM.  Doing well again, up in chair. He drank a few boost yesterday, tolerated fine. Denies fevers, chest pain, or SOB at this time. He thinks he is making a bit more urine.      MEDICATIONS:  Current Meds    B and C vitamin Complex with folic acid  5 mL Per Feeding Tube Daily     DULoxetine  20 mg Oral Daily     fiber modular (NUTRISOURCE FIBER)  1 packet Per Feeding Tube Q6H     fluticasone  2 puff Inhalation Q12H     gelatin absorbable  1 each Topical During Hemodialysis (from stock)     insulin aspart  1-6 Units Subcutaneous Q4H     levothyroxine  100 mcg Oral Daily     lidocaine  1 patch Transdermal Q24H     lidocaine   Transdermal Q8H     lidocaine   Transdermal Q24h     melatonin  3 mg Oral At Bedtime     mesalamine  2,400 mg Oral BID     montelukast  10 mg Oral At Bedtime     mycophenolate  1,000 mg Oral or Feeding Tube BID     nystatin  500,000 Units Oral 4x Daily     predniSONE  15 mg Oral BID w/meals     ranitidine  150 mg Oral Daily     rosuvastatin  10 mg Oral Daily     senna-docusate  1 tablet Oral BID    Or     senna-docusate  2 tablet Oral BID     sildenafil  20 mg Oral TID     sodium chloride (PF)  3 mL Intracatheter Q8H     sodium chloride (PF)  9 mL Intracatheter During Hemodialysis (from stock)     sodium chloride (PF)  9 mL Intracatheter During Hemodialysis (from stock)     tacrolimus  1.5 mg Oral BID IS     Infusion Meds    IV fluid REPLACEMENT ONLY       DOBUTamine 2.5 mcg/kg/min (19 0400)     HEParin 950 Units/hr (19 0613)     - MEDICATION INSTRUCTIONS -       - MEDICATION INSTRUCTIONS -       BETA BLOCKER NOT PRESCRIBED       REVIEW OF SYSTEMS:  A comprehensive of systems was not obtained due to mental status.      PHYSICAL EXAM:   Temp  Av.9  F (36.6  C)  Min: 95.9  F (35.5  C)  Max: 100  F (37.8  C)  Arterial Line BP  Min: 68/56  Max: 182/36  Arterial Line BP 2  Min: 90/54  Max: 107/56  Arterial Line MAP (mmHg)  Av.7 mmHg  Min: 57 mmHg  Max: 123 mmHg Arterial Line Location  "2: Right femoral    Pulse  Av.5  Min: 59  Max: 140 Resp  Av.1  Min: 8  Max: 45  FiO2 (%)  Av %  Min: 40 %  Max: 100 %  SpO2  Av.1 %  Min: 88 %  Max: 100 %    CVP (mmHg): 21 mmHg  BP 99/51   Pulse 96   Temp 98.7  F (37.1  C) (Oral)   Resp 17   Ht 1.727 m (5' 8\")   Wt 56.6 kg (124 lb 12.5 oz)   SpO2 99%   BMI 18.97 kg/m     Date 19 07 - 19 0659   Shift 3994-7931 0250-7716 3302-4839 24 Hour Total   INTAKE   P.O. 100   100   I.V. 70.4   70.4   Shift Total(mL/kg) 170.4(2.74)   170.4(2.74)   OUTPUT   Chest Tube(mL/kg) 238(3.83)   238(3.83)   Shift Total(mL/kg) 238(3.83)   238(3.83)   Weight (kg) 62.1 62.1 62.1 62.1      Admit Weight: 63.5 kg (140 lb 1.6 oz)     GENERAL APPEARANCE: Alert  Lymphatics: no cervical  LAD  HEENT: NGT in place, sclerae anicteric  Pulmonary: lungs clear to auscultation with equal breath sounds bilaterally  CV: regular rhythm, normal rate   - Edema: None  GI: soft, nontender, normal bowel sounds.  SKIN: no rash, warm  NEURO: face symmetric, AO x 3    LABS:   I personally reviewed the labs.     CMP  Recent Labs   Lab 19  0416 03/15/19  0409 19  2116 19  0329 19  1545 19  1000    137 137 137 139 138   POTASSIUM 3.5 3.6 3.9 3.8 3.9 4.4   CHLORIDE 108 105 105 106 106 106   CO2 19* 22 23 23 24 21   ANIONGAP 11 10 9 9 8 12   * 136* 103* 197* 173* 150*   BUN 35* 18 15 33* 25 26   CR 1.79* 1.44* 1.16 1.68* 1.19 1.34*   GFRESTIMATED 41* 54* 70 45* 68 59*   GFRESTBLACK 48* 62 81 52* 79 68   RADAMES 7.7* 7.4* 7.6* 7.4* 7.6* 7.0*   MAG 1.8  --   --  2.2 2.4* 2.4*   PHOS 1.9* 2.7  --  1.6* 2.2* 2.3*   PROTTOTAL  --  4.4*  --  4.3* 4.5* 4.8*   ALBUMIN  --  2.3*  --  2.2* 2.2* 2.4*   BILITOTAL  --  0.8  --  0.6 1.1 0.7   ALKPHOS  --  88  --  97 98 105   AST  --  20  --  23 28 28   ALT  --  24  --  23 24 25     CBC  Recent Labs   Lab 19  0416 03/15/19  0800 19  2116 19  0450 19  0329   HGB 8.0* 8.3* 6.9*  --  " 7.1*   WBC 15.7* 16.7* 16.6*  --  15.4*   RBC 2.73* 2.73* 2.34*  --  2.35*   HCT 25.8* 25.8* 22.1*  --  22.5*   MCV 95 95 94  --  96   MCH 29.3 30.4 29.5  --  30.2   MCHC 31.0* 32.2 31.2*  --  31.6   RDW 17.5* 17.1* 17.4*  --  17.4*   PLT 40* 37* 38* 38* 40*     INR  Recent Labs   Lab 03/16/19  0416 03/14/19  2116 03/14/19  1653 03/11/19  0338   INR 1.25* 1.33* 1.36* 1.50*   PTT  --   --  46*  --      ABG  Recent Labs   Lab 03/11/19  1152 03/10/19  1214   O2PER 21 21      URINE STUDIES  Recent Labs   Lab Test 03/04/19  1303 02/23/19  1921 02/18/19  0957 11/15/18  1000   COLOR Yellow Yellow Yellow Yellow   APPEARANCE Clear Clear Clear Clear   URINEGLC Negative Negative Negative Negative   URINEBILI Negative Negative Negative Negative   URINEKETONE Negative Negative Negative Negative   SG 1.014 1.008 1.008 1.015   UBLD Negative Negative Negative Negative   URINEPH 5.0 6.0 5.5 5.0   PROTEIN Negative Negative Negative Negative   NITRITE Negative Negative Negative Negative   LEUKEST Negative Negative Negative Negative   RBCU 0 <1 <1 <1   WBCU 0 <1 1 1     No lab results found.  PTH  No lab results found.  IRON STUDIES  Recent Labs   Lab Test 11/15/18  0955 10/11/18  1235   IRON  --  63   FEB  --  457*   IRONSAT  --  14*   NATE 1,045*  --        IMAGING:  Reviewed

## 2019-03-16 NOTE — PROGRESS NOTES
Calorie Count  Intake recorded for: 3/15  Total Kcals: 401 Total Protein: 8g  Kcals from Hospital Food: 401  Protein: 8g  Kcals from Outside Food (average):0 Protein: 0g  # Meals Recorded: 100% 1 Tamazight toast w/ butter & syrup, ice cream  # Supplements Recorded: 0

## 2019-03-16 NOTE — PLAN OF CARE
D/I:Pt on 4A s/p heart tx on 2/24  Neuro: Alert and oriented, Follows commands  Pulm: RA, LS clear.   CV:NSR , afebrile  GI: BM 2x loose watery , partial pills and some powder evidenced in stool. MD aware. 2L fluid restriction, 2 gram sodium diet. NG bolus feeds 8P-8A @80/hr.   : mixed urine output with stool, typically oliguric. On HD dialysis.   Heparin increased to 950 and bolus given, Dobutamine @2.5    Plan: Replace Phos, Recheck phos and Hep Xa, Continue to monitor, notify MD with any concerns. Transfer to 6C when a bed is available.   See flowsheets for additional documentation and intervention.

## 2019-03-16 NOTE — PLAN OF CARE
D/I: Hx of heart tx on 2/24, CRRT d/yo on 3/13.  Neuro: Alert and oriented, at times makes some forgetful statements. Follows commands, easily redirectable.   Respiratory: On RA, LS clear.   GI: 3x loose watery bowel movements, whole pills and some powder evidenced in stool. MD aware. 2L fluid restriction, 2 gram sodium diet. NG in place with bolus feeds occurring at night. HD run today.  : Unmeasurable urine output, typically oliguric. On HD dialysis.   Vitals: VSS. Afebrile.   Activity/pain: Decrease in pain, more positional in shoulder. Up with standby assist.  Continue to monitor, notify MD with any concerns.

## 2019-03-16 NOTE — PROGRESS NOTES
HEMODIALYSIS TREATMENT NOTE    Date: 3/16/2019  Time: 5:03 PM    Data:  Pre Wt:   56.6 kg  Desired Wt: 54 kg   Ultrafiltration - Post Run Net Total Removed (mL): 2600 mL  Ultrafiltration - Post Run Net Total Gain (mL): 0 mL  Vascular Access Status: Yes, secured and visible  Dialyzer Rinse: Clear  Total Blood Volume Processed: 52.7  Total Dialysis (Treatment) Time:  3 hrs      Lab:   No    Interventions/Assessment:  52 yo HD for 3 hours; 2.6L fluid removed. Pt tolerated tx well. CVC patent, dressing CDI,  per orders. See MAR for medications. Report given to Savana GARCIA 4A; new weight update requested this shift.      Plan:    Per renal team.

## 2019-03-16 NOTE — PROGRESS NOTES
ADVANCED HEART FAILURE PROGRESS NOTE    SUBJECTIVE:  No acute events. Feeling well this morning.    ROS otherwise negative.    OBJECTIVE:  Vital signs:  Temp: 98.2  F (36.8  C) Temp  Min: 97.1  F (36.2  C)  Max: 98.5  F (36.9  C)  Heart Rate: 95 Heart Rate  Min: 91  Max: 102  BP: 102/53 Systolic (24hrs), Av , Min:97 , Max:112   Diastolic (24hrs), Av, Min:52, Max:73    Resp: 18 Resp  Min: 16  Max: 20  SpO2: 98 % SpO2  Min: 97 %  Max: 100 %      Intake/Output Summary (Last 24 hours) at 3/16/2019 0656  Last data filed at 3/16/2019 0600  Gross per 24 hour   Intake 2672.3 ml   Output 150 ml   Net 2522.3 ml       Vitals:    19 0400 19 0400 19 0600   Weight: 59.8 kg (131 lb 13.4 oz) 57.5 kg (126 lb 12.2 oz) 55.4 kg (122 lb 2.2 oz)       Physical Exam:  GEN:  no distress, sitting in chair  CV:  Regular rate and rhythm, no murmur.   CHEST: Subclavian incision sutured.  Chest tubes.  LUNGS:  On room air, normal work of breathing, Clear to auscultation bilaterally, no wheezes or crackles.   ABD:  Active bowel sounds, soft, non-tender/non-distended.  No rebound/guarding/rigidity.  EXT:  No edema or cyanosis. Normal distal puses  NEURO: no focal deficits      Medications:    IV fluid REPLACEMENT ONLY       DOBUTamine 2.5 mcg/kg/min (19 0400)     HEParin 950 Units/hr (19 0613)     - MEDICATION INSTRUCTIONS -       - MEDICATION INSTRUCTIONS -       BETA BLOCKER NOT PRESCRIBED         Current Facility-Administered Medications   Medication Dose Route Frequency     B and C vitamin Complex with folic acid  5 mL Per Feeding Tube Daily     DULoxetine  20 mg Oral Daily     fiber modular (NUTRISOURCE FIBER)  1 packet Per Feeding Tube Q6H     fluticasone  2 puff Inhalation Q12H     insulin aspart  1-7 Units Subcutaneous TID AC     insulin aspart  1-5 Units Subcutaneous At Bedtime     levothyroxine  100 mcg Oral Daily     lidocaine  1 patch Transdermal Q24H     lidocaine   Transdermal Q8H     lidocaine    Transdermal Q24h     melatonin  3 mg Oral At Bedtime     mesalamine  2,400 mg Oral BID     montelukast  10 mg Oral At Bedtime     mycophenolate  1,000 mg Oral or Feeding Tube BID    And     mycophenolate  1,000 mg Oral BID     nystatin  500,000 Units Oral 4x Daily     predniSONE  15 mg Oral BID w/meals     ranitidine  150 mg Oral Daily     rosuvastatin  10 mg Oral Daily     senna-docusate  1 tablet Oral BID    Or     senna-docusate  2 tablet Oral BID     sildenafil  20 mg Oral TID     sodium chloride (PF)  3 mL Intracatheter Q8H     tacrolimus  1.5 mg Oral BID IS         Labs:  CMP  Recent Labs   Lab 03/16/19  0416 03/15/19  0409  03/14/19  1653 03/14/19  0329  03/10/19  0410    137   < >  --  137   < > 133   POTASSIUM 3.5 3.6   < >  --  3.8   < > 4.6   CHLORIDE 108 105   < >  --  106   < > 102   CO2 19* 22   < >  --  23   < > 18*   BUN 35* 18   < >  --  33*   < > 58*   CR 1.79* 1.44*   < >  --  1.68*   < > 2.61*   RADAMES 7.7* 7.4*   < >  --  7.4*   < > 8.0*   MAG 1.8  --   --   --  2.2   < > 2.7*   PHOS 1.9* 2.7  --   --  1.6*   < > 5.8*   * 136*   < >  --  197*   < > 88   LDH  --   --   --  320*  --   --  276*   ALKPHOS  --  88  --   --  97   < > 104   BILITOTAL  --  0.8  --   --  0.6   < > 0.8   AST  --  20  --   --  23   < > 22   ALT  --  24  --   --  23   < > 19    < > = values in this interval not displayed.     BLOOD GASNo lab results found in last 7 days.  CBC  Recent Labs   Lab 03/16/19  0416 03/15/19  0800   WBC 15.7* 16.7*   HGB 8.0* 8.3*   HCT 25.8* 25.8*   PLT 40* 37*     COAG  Recent Labs   Lab 03/16/19 0416 03/14/19  2116 03/14/19  1653   INR 1.25* 1.33* 1.36*   PTT  --   --  46*         ASSESSMENT/PLAN:  Everton Larios is a 54 yo gentleman who underwent orthotopic heart transplant 2/24/19 for non ischemic cardiomyopathy and cardiogenic shock. Post-op complicated by RV dysfunction and FRANKLIN.      #Oliguric renal failure:  Etiology cardiorenal from venous congestion vs. nephrotoxins.  - iHD per  renal  - nephrology consulted, appreciate assistance  - plan for tunneled line later this week     #Sepsis  - changed to cefepime from zosyn to finish 7 day course (ending 3/16/19)  - cultures no growth to date     #S/p OHT on 2/24:  Graft function:  - echo 3/1 shows normal LV function, RV is mild to moderately dilated and hypokinetic  - dobutamine at 2.5 for continued RV and hemodynamic support, continue this while we attempt to get him euvolemic and then plan to wean down the dobutamine  - continue sildenafil 20 q8h     #Thrombocytopenia/Anemia:  Per hematology this is felt to be most likely medication induced  - Changed to cefepime from zosyn  - holding valcyte     Immunosuppression:  - on MMF decreased at 1000 BID  - Prednisone 15mg BID, decreasing by 5mg daily with negative biopsies  - NOT on Simulect protocol  - tacrolimus 1.5mg BID (goal 10-12)     Prophylaxis:  - CMV +/+: medium risk, Valcyte held given thrombocytopenia/anemia  - PCP: holding Bactrim for now, pentamadine given 3/3  - Thrush: improved on nystatin  - GI: on PPI  - CAV: Crestor, holding ASA given bleeding at line sites     Biopsies:  - First biopsy 3/4, 0R, no AMR  - Second biopsy 3/11, 0R, no AMR  - Third biopsy due 3/18     Goretex conduit for persistent left SVC:  - will need anticoagulation, on low intensity heparin  **NO CENTRAL VENOUS CATHETERS ON THE LEFT UPPER BODY**      Seen and staffed with .      Trev Harris MD  Advanced Heart Failure Fellow  [phone]

## 2019-03-16 NOTE — PROGRESS NOTES
[facility] ICU PROGRESS NOTE  March 16, 2019      CO-MORBIDITIES:   Chronic systolic heart failure (H)  (primary encounter diagnosis)  Acute on chronic systolic congestive heart failure (H)    ASSESSMENT: Everton Larios is a 53 year old male with PMH etoh abuse, hypothyroidism, intermittent asthma, ulcerative colitis, Depression, Chronic HFrEF, NICM admitted with cardiogenic shock requiring subclavian balloon pump now s/p OHT 2/24/19 with Piyush House and Christopher.      TODAY'S PROGRESS:   Continue calorie counts  PRN imodium for diarrhea  TTS iHD schedule- dialysis today  Tunneled HD line to be placed 3/20  Transfer to floor (6C) when bed available    PLAN:  Neuro/ pain/ sedation:  - Monitor neurological status. Notify the MD for any acute changes in exam.  - Tylenol prn for pain.      #ICU delirium, improving  - Head CT 3/9 negative  - Optimize sleep/wake cycle, melatonin for sleep      #Depression  - PTA cymbalta 20mg     Pulmonary care:   - Doing well on room air  - Supp oxygen to maintain SpO2 >92%  - Encourage IS and deep breathing  - Chest tubes all removed.    #Intermittent asthma  - PTA Montelukast ordered    Cardiovascular: HR goal >95. Echo 3/5 with good LV function, mild decreased RV function (moderately dilated).  - R heart cath and biopsy 3/4 and 3/11, biopsies negative for rejection. Next scheduled 3/18.  - Monitor hemodynamic status.   - MAP >65. HR goal > 95.  - Dobutamine 2.5 mcg/kg/min, sildenafil 20 q8h   - Continue statin. No aspirin per cards.   - Pacing wires removed 3/13.     GI care:   #Severe malnutrition  - Regular diet  - Cycle TF.   - Calorie counts ongoing.  - Fiber given diarrhea.   - PRN imodium     #Intraperitoneal free air (CT obtained 2/26)   - Small peritoneal defect made during redo operation, closed with stitches intra-op- likely source of free air. Resolved.    #Iperce's esophagus  #Ulcerative colitis  -PTA Mesalamine     Fluids/ Electrolytes/ Nutrition:   - TKO for IV fluid  hydration  - TF for enteral nutritional support as above.  - No indication for parenteral nutrition.  - Replace electrolytes as needed   #Hyponatremia  - Asymptomatic/improved with dialysis     Renal/ Fluid Balance:   #FRANKLIN, oliguric.    - Etiology likely cardiorenal from venous congestion vs nephrotoxicity from multiple medications  - CRRT started (3/9), transitioned to iHD 3/14. Nephrology following, appreciate recommendations  - Continue TTS iHD schedule  - Will continue to monitor intake and output.  - PTA Tamsulosin      Endocrine:    # Stress hyperglycemia  -SSI  # hypothyroidism  - Home levothyroxine      ID/ Antibiotics: Perioperative antibiotics vanc/zosyn x 3 days- completed  #Leukocytosis, persistent since OR 2/24, afebrile   - BCx 3/9 x2 NGTD, CDiff 3/10/2019 negative  - BC and swan tip cultured 3/4 for leukocytosis- NGTD.   - Empirically started on Vanc (3/9-3/11). Zosyn (3/9-3/14), switched to cefepime (3/14-3/15) due to thrombocytopenia. Antibiotics completed 3/15.  - holding Valcyte for CMV prophylaxis; holding bactrim for PCP prophylaxis. Pentamidine given on 3/4.   - Nystatin for thrush     Heme:     #Bypass of persistent left SVC to R atrial appendage with [place]-Davis graft. Will require lifelong anti-coagulation for graft  - Low intensity heparin gtt  #Anemia, hemolysis unlikely, peripheral smear unremarkable  - Hgb goal >7. Daily CBC.  - Hgb 8.0, no s/s bleeding  #Thrombocytopenia, drug induced vs marrow suppression, HIT negative  - Heme consult, appreciate recs.   - Zosyn stopped, Valcyte held.  - Plt 40K, will transfuse if <30K, sign of bleeding or impending procedure.  - If Plt <20K, will send post-transfusion purpura screen and IVIG x 2 days per heme.    Prophylaxis:    - Mechanical prophylaxis for DVT  - Heparin as above  - Zantac qd     Lines/ tubes/ drains:  - RIJ HD line 3/8, LUE PICC 3/9      Disposition:  - [facility] ICU.     Patient seen, findings and plan discussed with CV ICU staff,   Lázaro.    Leyla Bowling MD  General Surgery PGY-3  ====================================    SUBJECTIVE:   Mental status slowly improving, intermittently forgetful  iHD planned for today, tolerated last run without hypotension  Not eating well, calorie counts low  Watery stools, intermittently with whole pills    OBJECTIVE:   1. VITAL SIGNS:   Temp:  [97.1  F (36.2  C)-98.7  F (37.1  C)] 98.7  F (37.1  C)  Heart Rate:  [] 93  Resp:  [16-21] 21  BP: ()/(51-72) 98/57  SpO2:  [95 %-100 %] 100 %  Resp: 21      2. INTAKE/ OUTPUT:   I/O last 3 completed shifts:  In: 2672.3 [P.O.:1090; I.V.:232.3; NG/GT:390]  Out: 150 [Urine:150]    3. PHYSICAL EXAMINATION:     General: Resting in bed, alert, NAD  Neuro: Oriented to person and place, moves all four extremities spontaneously   Resp: NLB on RA  CV: Regular rate, regular rhythm   Right chest wall hematoma stable, minimal ecchymosis   Abdomen: Soft, non distended, non-tender  Incisions: c/d/i, no erythema or drainage  Extremities: warm and well perfused, no edema    4. INVESTIGATIONS:   Arterial Blood Gases   No lab results found in last 7 days.  Complete Blood Count   Recent Labs   Lab 03/16/19  0416 03/15/19  0800 03/14/19 2116 03/14/19  0450 03/14/19  0329   WBC 15.7* 16.7* 16.6*  --  15.4*   HGB 8.0* 8.3* 6.9*  --  7.1*   PLT 40* 37* 38* 38* 40*     Basic Metabolic Panel  Recent Labs   Lab 03/16/19  0416 03/15/19  0409 03/14/19 2116 03/14/19  0329    137 137 137   POTASSIUM 3.5 3.6 3.9 3.8   CHLORIDE 108 105 105 106   CO2 19* 22 23 23   BUN 35* 18 15 33*   CR 1.79* 1.44* 1.16 1.68*   * 136* 103* 197*     Liver Function Tests  Recent Labs   Lab 03/16/19  0416 03/15/19  0409 03/14/19  2116 03/14/19  1653 03/14/19  0329 03/13/19  1545 03/13/19  1000  03/11/19  0338   AST  --  20  --   --  23 28 28   < > 29   ALT  --  24  --   --  23 24 25   < > 22   ALKPHOS  --  88  --   --  97 98 105   < > 102   BILITOTAL  --  0.8  --   --  0.6 1.1 0.7   < > 0.9    ALBUMIN  --  2.3*  --   --  2.2* 2.2* 2.4*   < > 2.8*   INR 1.25*  --  1.33* 1.36*  --   --   --   --  1.50*    < > = values in this interval not displayed.     Pancreatic Enzymes  No lab results found in last 7 days.  Coagulation Profile  Recent Labs   Lab 03/16/19  0416 03/14/19  2116 03/14/19  1653 03/11/19  0338   INR 1.25* 1.33* 1.36* 1.50*   PTT  --   --  46*  --          5. RADIOLOGY:   Recent Results (from the past 24 hour(s))   XR Chest Port 1 View    Narrative    EXAM: XR CHEST PORT 1 VW  3/15/2019 3:52 PM     HISTORY:  eval s/p chest tube removal   53 year old male with history  of chronic renal insufficiency, EtOH abuse, GIB w/splenic  embolization, PAF s/p ablation and cardioversion, NICM c/b cardiogenic  shock requiring IABP, s/p OHT (2/24/19) on tacrolimus and MMF,  complicated by RV dysfunction per echo and acute oliguric kidney  injury    COMPARISON:  3/13/2019    FINDINGS: AP radiograph of the chest. Right central venous catheter  tip projects over the expected location of the mid SVC. Enteric tube  courses of the expected location of the esophagus and stomach. Left  PICC tip projects over the expected location of the left SVC. Left  axillary surgical clips. Sternotomy wires intact. Removal of bilateral  chest tubes.    The trachea is midline. Cardiac mediastinal silhouette stable. Streaky  opacification of the inferior right upper lobe. Unchanged cardiac  radiopacity likely representing atelectasis. Trace apical  pneumothoraces unchanged. Small right pleural effusion.      Impression    IMPRESSION:   1. Removal bilateral chest tubes with stable trace apical  pneumothoraces.  2. Streaky opacification of the inferior right upper lobe likely  representing atelectasis versus hemorrhage or less likely infection.  3. Small right pleural effusion.  4. Stable positioning of additional support devices.    I have personally reviewed the examination and initial interpretation  and I agree with the  findings.    KISHA GUAMAN, MD       =========================================

## 2019-03-16 NOTE — PROGRESS NOTES
CVTS PROGRESS NOTE  March 16, 2019      CO-MORBIDITIES:   Chronic systolic heart failure (H)  (primary encounter diagnosis)  Acute on chronic systolic congestive heart failure (H)    ASSESSMENT: Everton Larios is a 53 year old male with PMH etoh abuse, hypothyroidism, intermittent asthma, ulcerative colitis, Depression, Chronic HFrEF, NICM admitted with cardiogenic shock requiring subclavian balloon pump now s/p OHT 2/24/19 with Piyush House and Christopher.      TODAY'S PROGRESS:   Continue calorie counts  PRN imodium for diarrhea  TTS iHD schedule- dialysis today  Tunneled HD line to be placed 3/20  Transfer to floor (6C) when bed available    PLAN:  Neuro/ pain/ sedation:  - Monitor neurological status. Notify the MD for any acute changes in exam.  - Tylenol prn for pain.      #ICU delirium, improving  - Head CT 3/9 negative  - Optimize sleep/wake cycle, melatonin for sleep      #Depression  - PTA cymbalta 20mg     Pulmonary care:   - Doing well on room air  - Supp oxygen to maintain SpO2 >92%  - Encourage IS and deep breathing  - Chest tubes all removed.    #Intermittent asthma  - PTA Montelukast ordered    Cardiovascular: HR goal >95. Echo 3/5 with good LV function, mild decreased RV function (moderately dilated).  - R heart cath and biopsy 3/4 and 3/11, biopsies negative for rejection. Next scheduled 3/18.  - Monitor hemodynamic status.   - MAP >65. HR goal > 95.  - Dobutamine 2.5 mcg/kg/min, sildenafil 20 q8h   - Continue statin. No aspirin per cards.   - Pacing wires removed 3/13.     GI care:   #Severe malnutrition  - Regular diet  - Cycle TF.   - Calorie counts ongoing.  - Fiber given diarrhea.   - PRN imodium     #Intraperitoneal free air (CT obtained 2/26)   - Small peritoneal defect made during redo operation, closed with stitches intra-op- likely source of free air. Resolved.    #Pierce's esophagus  #Ulcerative colitis  -PTA Mesalamine     Fluids/ Electrolytes/ Nutrition:   - TKO for IV fluid  hydration  - TF for enteral nutritional support as above.  - No indication for parenteral nutrition.  - Replace electrolytes as needed   #Hyponatremia  - Asymptomatic/improved with dialysis     Renal/ Fluid Balance:   #FRANKLIN, oliguric.    - Etiology likely cardiorenal from venous congestion vs nephrotoxicity from multiple medications  - CRRT started (3/9), transitioned to iHD 3/14. Nephrology following, appreciate recommendations  - Continue TTS iHD schedule  - Will continue to monitor intake and output.  - PTA Tamsulosin      Endocrine:    # Stress hyperglycemia  -SSI  # hypothyroidism  - Home levothyroxine      ID/ Antibiotics: Perioperative antibiotics vanc/zosyn x 3 days- completed  #Leukocytosis, persistent since OR 2/24, afebrile   - BCx 3/9 x2 NGTD, CDiff 3/10/2019 negative  - BC and swan tip cultured 3/4 for leukocytosis- NGTD.   - Empirically started on Vanc (3/9-3/11). Zosyn (3/9-3/14), switched to cefepime (3/14-3/15) due to thrombocytopenia. Antibiotics completed 3/15.  - holding Valcyte for CMV prophylaxis; holding bactrim for PCP prophylaxis. Pentamidine given on 3/4.   - Nystatin for thrush     Heme:     #Bypass of persistent left SVC to R atrial appendage with [place]-Davis graft. Will require lifelong anti-coagulation for graft  - Low intensity heparin gtt  #Anemia, hemolysis unlikely, peripheral smear unremarkable  - Hgb goal >7. Daily CBC.  - Hgb 8.0, no s/s bleeding  #Thrombocytopenia, drug induced vs marrow suppression, HIT negative  - Heme consult, appreciate recs.   - Zosyn stopped, Valcyte held.  - Plt 40K, will transfuse if <30K, sign of bleeding or impending procedure.  - If Plt <20K, will send post-transfusion purpura screen and IVIG x 2 days per heme.    Prophylaxis:    - Mechanical prophylaxis for DVT  - Heparin as above  - Zantac qd     Lines/ tubes/ drains:  - RIJ HD line 3/8, LUE PICC 3/9      Disposition:  - [facility] ICU.     Patient seen, findings and plan discussed with CVTS staff,   Harsh.    Leyla Bowling MD  General Surgery PGY-3  ====================================    SUBJECTIVE:   Mental status slowly improving, intermittently forgetful  iHD planned for today, tolerated last run without hypotension  Not eating well, calorie counts low  Watery stools, intermittently with whole pills    OBJECTIVE:   1. VITAL SIGNS:   Temp:  [97.1  F (36.2  C)-98.7  F (37.1  C)] 98.7  F (37.1  C)  Heart Rate:  [] 93  Resp:  [16-21] 21  BP: ()/(51-72) 98/57  SpO2:  [95 %-100 %] 100 %  Resp: 21      2. INTAKE/ OUTPUT:   I/O last 3 completed shifts:  In: 2672.3 [P.O.:1090; I.V.:232.3; NG/GT:390]  Out: 150 [Urine:150]    3. PHYSICAL EXAMINATION:     General: Resting in bed, alert, NAD  Neuro: Oriented to person and place, moves all four extremities spontaneously   Resp: NLB on RA  CV: Regular rate, regular rhythm   Right chest wall hematoma stable, minimal ecchymosis   Abdomen: Soft, non distended, non-tender  Incisions: c/d/i, no erythema or drainage  Extremities: warm and well perfused, no edema    4. INVESTIGATIONS:   Arterial Blood Gases   No lab results found in last 7 days.  Complete Blood Count   Recent Labs   Lab 03/16/19  0416 03/15/19  0800 03/14/19 2116 03/14/19  0450 03/14/19  0329   WBC 15.7* 16.7* 16.6*  --  15.4*   HGB 8.0* 8.3* 6.9*  --  7.1*   PLT 40* 37* 38* 38* 40*     Basic Metabolic Panel  Recent Labs   Lab 03/16/19  0416 03/15/19  0409 03/14/19 2116 03/14/19  0329    137 137 137   POTASSIUM 3.5 3.6 3.9 3.8   CHLORIDE 108 105 105 106   CO2 19* 22 23 23   BUN 35* 18 15 33*   CR 1.79* 1.44* 1.16 1.68*   * 136* 103* 197*     Liver Function Tests  Recent Labs   Lab 03/16/19  0416 03/15/19  0409 03/14/19  2116 03/14/19  1653 03/14/19  0329 03/13/19  1545 03/13/19  1000  03/11/19  0338   AST  --  20  --   --  23 28 28   < > 29   ALT  --  24  --   --  23 24 25   < > 22   ALKPHOS  --  88  --   --  97 98 105   < > 102   BILITOTAL  --  0.8  --   --  0.6 1.1 0.7   < > 0.9    ALBUMIN  --  2.3*  --   --  2.2* 2.2* 2.4*   < > 2.8*   INR 1.25*  --  1.33* 1.36*  --   --   --   --  1.50*    < > = values in this interval not displayed.     Pancreatic Enzymes  No lab results found in last 7 days.  Coagulation Profile  Recent Labs   Lab 03/16/19  0416 03/14/19  2116 03/14/19  1653 03/11/19  0338   INR 1.25* 1.33* 1.36* 1.50*   PTT  --   --  46*  --          5. RADIOLOGY:   Recent Results (from the past 24 hour(s))   XR Chest Port 1 View    Narrative    EXAM: XR CHEST PORT 1 VW  3/15/2019 3:52 PM     HISTORY:  eval s/p chest tube removal   53 year old male with history  of chronic renal insufficiency, EtOH abuse, GIB w/splenic  embolization, PAF s/p ablation and cardioversion, NICM c/b cardiogenic  shock requiring IABP, s/p OHT (2/24/19) on tacrolimus and MMF,  complicated by RV dysfunction per echo and acute oliguric kidney  injury    COMPARISON:  3/13/2019    FINDINGS: AP radiograph of the chest. Right central venous catheter  tip projects over the expected location of the mid SVC. Enteric tube  courses of the expected location of the esophagus and stomach. Left  PICC tip projects over the expected location of the left SVC. Left  axillary surgical clips. Sternotomy wires intact. Removal of bilateral  chest tubes.    The trachea is midline. Cardiac mediastinal silhouette stable. Streaky  opacification of the inferior right upper lobe. Unchanged cardiac  radiopacity likely representing atelectasis. Trace apical  pneumothoraces unchanged. Small right pleural effusion.      Impression    IMPRESSION:   1. Removal bilateral chest tubes with stable trace apical  pneumothoraces.  2. Streaky opacification of the inferior right upper lobe likely  representing atelectasis versus hemorrhage or less likely infection.  3. Small right pleural effusion.  4. Stable positioning of additional support devices.    I have personally reviewed the examination and initial interpretation  and I agree with the  findings.    KISHA GUAMAN, MD       =========================================

## 2019-03-17 LAB
ALBUMIN UR-MCNC: 100 MG/DL
AMORPH CRY #/AREA URNS HPF: ABNORMAL /HPF
ANION GAP SERPL CALCULATED.3IONS-SCNC: 13 MMOL/L (ref 3–14)
APPEARANCE UR: ABNORMAL
BACTERIA #/AREA URNS HPF: ABNORMAL /HPF
BILIRUB UR QL STRIP: NEGATIVE
BUN SERPL-MCNC: 30 MG/DL (ref 7–30)
C DIFF TOX B STL QL: NEGATIVE
CALCIUM SERPL-MCNC: 7.6 MG/DL (ref 8.5–10.1)
CHLORIDE SERPL-SCNC: 102 MMOL/L (ref 94–109)
CO2 SERPL-SCNC: 20 MMOL/L (ref 20–32)
COLOR UR AUTO: YELLOW
CREAT SERPL-MCNC: 1.44 MG/DL (ref 0.66–1.25)
ERYTHROCYTE [DISTWIDTH] IN BLOOD BY AUTOMATED COUNT: 17.7 % (ref 10–15)
GFR SERPL CREATININE-BSD FRML MDRD: 54 ML/MIN/{1.73_M2}
GLUCOSE BLDC GLUCOMTR-MCNC: 102 MG/DL (ref 70–99)
GLUCOSE BLDC GLUCOMTR-MCNC: 134 MG/DL (ref 70–99)
GLUCOSE BLDC GLUCOMTR-MCNC: 169 MG/DL (ref 70–99)
GLUCOSE BLDC GLUCOMTR-MCNC: 211 MG/DL (ref 70–99)
GLUCOSE BLDC GLUCOMTR-MCNC: 212 MG/DL (ref 70–99)
GLUCOSE BLDC GLUCOMTR-MCNC: 98 MG/DL (ref 70–99)
GLUCOSE SERPL-MCNC: 221 MG/DL (ref 70–99)
GLUCOSE UR STRIP-MCNC: 30 MG/DL
HCT VFR BLD AUTO: 24.6 % (ref 40–53)
HGB BLD-MCNC: 7.6 G/DL (ref 13.3–17.7)
HGB UR QL STRIP: ABNORMAL
INR PPP: 1.25 (ref 0.86–1.14)
KETONES UR STRIP-MCNC: NEGATIVE MG/DL
LACTATE BLD-SCNC: 1.3 MMOL/L (ref 0.7–2)
LEUKOCYTE ESTERASE UR QL STRIP: NEGATIVE
LMWH PPP CHRO-ACNC: 0.24 IU/ML
MAGNESIUM SERPL-MCNC: 2.1 MG/DL (ref 1.6–2.3)
MCH RBC QN AUTO: 29.9 PG (ref 26.5–33)
MCHC RBC AUTO-ENTMCNC: 30.9 G/DL (ref 31.5–36.5)
MCV RBC AUTO: 97 FL (ref 78–100)
NITRATE UR QL: NEGATIVE
PH UR STRIP: 5.5 PH (ref 5–7)
PHOSPHATE SERPL-MCNC: 2.6 MG/DL (ref 2.5–4.5)
PLATELET # BLD AUTO: 43 10E9/L (ref 150–450)
POTASSIUM SERPL-SCNC: 3.8 MMOL/L (ref 3.4–5.3)
RBC # BLD AUTO: 2.54 10E12/L (ref 4.4–5.9)
RBC #/AREA URNS AUTO: 14 /HPF (ref 0–2)
SODIUM SERPL-SCNC: 135 MMOL/L (ref 133–144)
SOURCE: ABNORMAL
SP GR UR STRIP: 1.02 (ref 1–1.03)
SPECIMEN SOURCE: NORMAL
TACROLIMUS BLD-MCNC: 7.5 UG/L (ref 5–15)
TME LAST DOSE: NORMAL H
UROBILINOGEN UR STRIP-MCNC: NORMAL MG/DL (ref 0–2)
WBC # BLD AUTO: 16.2 10E9/L (ref 4–11)
WBC #/AREA URNS AUTO: 15 /HPF (ref 0–5)

## 2019-03-17 PROCEDURE — 25000132 ZZH RX MED GY IP 250 OP 250 PS 637: Performed by: STUDENT IN AN ORGANIZED HEALTH CARE EDUCATION/TRAINING PROGRAM

## 2019-03-17 PROCEDURE — 25000132 ZZH RX MED GY IP 250 OP 250 PS 637: Performed by: INTERNAL MEDICINE

## 2019-03-17 PROCEDURE — 25000132 ZZH RX MED GY IP 250 OP 250 PS 637: Performed by: SURGERY

## 2019-03-17 PROCEDURE — 25000125 ZZHC RX 250: Performed by: INTERNAL MEDICINE

## 2019-03-17 PROCEDURE — 40000802 ZZH SITE CHECK

## 2019-03-17 PROCEDURE — 99233 SBSQ HOSP IP/OBS HIGH 50: CPT | Mod: GC | Performed by: INTERNAL MEDICINE

## 2019-03-17 PROCEDURE — 87493 C DIFF AMPLIFIED PROBE: CPT | Performed by: INTERNAL MEDICINE

## 2019-03-17 PROCEDURE — 27210429 ZZH NUTRITION PRODUCT INTERMEDIATE LITER

## 2019-03-17 PROCEDURE — 81001 URINALYSIS AUTO W/SCOPE: CPT | Performed by: STUDENT IN AN ORGANIZED HEALTH CARE EDUCATION/TRAINING PROGRAM

## 2019-03-17 PROCEDURE — 25000131 ZZH RX MED GY IP 250 OP 636 PS 637: Performed by: INTERNAL MEDICINE

## 2019-03-17 PROCEDURE — 84100 ASSAY OF PHOSPHORUS: CPT | Performed by: STUDENT IN AN ORGANIZED HEALTH CARE EDUCATION/TRAINING PROGRAM

## 2019-03-17 PROCEDURE — 40000558 ZZH STATISTIC CVC DRESSING CHANGE

## 2019-03-17 PROCEDURE — 83605 ASSAY OF LACTIC ACID: CPT | Performed by: INTERNAL MEDICINE

## 2019-03-17 PROCEDURE — 25000128 H RX IP 250 OP 636: Performed by: ANESTHESIOLOGY

## 2019-03-17 PROCEDURE — 80048 BASIC METABOLIC PNL TOTAL CA: CPT | Performed by: STUDENT IN AN ORGANIZED HEALTH CARE EDUCATION/TRAINING PROGRAM

## 2019-03-17 PROCEDURE — 85027 COMPLETE CBC AUTOMATED: CPT | Performed by: STUDENT IN AN ORGANIZED HEALTH CARE EDUCATION/TRAINING PROGRAM

## 2019-03-17 PROCEDURE — 25000131 ZZH RX MED GY IP 250 OP 636 PS 637: Performed by: STUDENT IN AN ORGANIZED HEALTH CARE EDUCATION/TRAINING PROGRAM

## 2019-03-17 PROCEDURE — 83735 ASSAY OF MAGNESIUM: CPT | Performed by: STUDENT IN AN ORGANIZED HEALTH CARE EDUCATION/TRAINING PROGRAM

## 2019-03-17 PROCEDURE — 21400000 ZZH R&B CCU UMMC

## 2019-03-17 PROCEDURE — 25000132 ZZH RX MED GY IP 250 OP 250 PS 637: Performed by: NURSE PRACTITIONER

## 2019-03-17 PROCEDURE — 85520 HEPARIN ASSAY: CPT | Performed by: STUDENT IN AN ORGANIZED HEALTH CARE EDUCATION/TRAINING PROGRAM

## 2019-03-17 PROCEDURE — 00000146 ZZHCL STATISTIC GLUCOSE BY METER IP

## 2019-03-17 PROCEDURE — 80197 ASSAY OF TACROLIMUS: CPT | Performed by: INTERNAL MEDICINE

## 2019-03-17 PROCEDURE — 85610 PROTHROMBIN TIME: CPT | Performed by: STUDENT IN AN ORGANIZED HEALTH CARE EDUCATION/TRAINING PROGRAM

## 2019-03-17 PROCEDURE — 25000131 ZZH RX MED GY IP 250 OP 636 PS 637: Performed by: HOSPITALIST

## 2019-03-17 PROCEDURE — 82668 ASSAY OF ERYTHROPOIETIN: CPT | Performed by: STUDENT IN AN ORGANIZED HEALTH CARE EDUCATION/TRAINING PROGRAM

## 2019-03-17 RX ORDER — LANOLIN ALCOHOL/MO/W.PET/CERES
3 CREAM (GRAM) TOPICAL ONCE
Status: COMPLETED | OUTPATIENT
Start: 2019-03-18 | End: 2019-03-18

## 2019-03-17 RX ORDER — SIMETHICONE 80 MG
80 TABLET,CHEWABLE ORAL EVERY 6 HOURS PRN
Status: DISCONTINUED | OUTPATIENT
Start: 2019-03-17 | End: 2019-04-03 | Stop reason: HOSPADM

## 2019-03-17 RX ADMIN — ACETAMINOPHEN 650 MG: 325 TABLET, FILM COATED ORAL at 05:06

## 2019-03-17 RX ADMIN — SILDENAFIL 20 MG: 20 TABLET ORAL at 11:08

## 2019-03-17 RX ADMIN — RANITIDINE 150 MG: 150 TABLET ORAL at 08:48

## 2019-03-17 RX ADMIN — SILDENAFIL 20 MG: 20 TABLET ORAL at 20:51

## 2019-03-17 RX ADMIN — ACETAMINOPHEN 650 MG: 325 TABLET, FILM COATED ORAL at 20:51

## 2019-03-17 RX ADMIN — ROSUVASTATIN CALCIUM 10 MG: 10 TABLET, FILM COATED ORAL at 08:48

## 2019-03-17 RX ADMIN — MESALAMINE 2400 MG: 800 TABLET, DELAYED RELEASE ORAL at 20:51

## 2019-03-17 RX ADMIN — LEVOTHYROXINE SODIUM 100 MCG: 100 TABLET ORAL at 08:48

## 2019-03-17 RX ADMIN — MONTELUKAST SODIUM 10 MG: 10 TABLET, COATED ORAL at 21:05

## 2019-03-17 RX ADMIN — MYCOPHENOLATE MOFETIL 1000 MG: 200 POWDER, FOR SUSPENSION ORAL at 08:47

## 2019-03-17 RX ADMIN — FLUTICASONE PROPIONATE 2 PUFF: 220 AEROSOL, METERED RESPIRATORY (INHALATION) at 09:06

## 2019-03-17 RX ADMIN — BENZOCAINE, MENTHOL 1 LOZENGE: 15; 3.6 LOZENGE ORAL at 13:41

## 2019-03-17 RX ADMIN — FLUTICASONE PROPIONATE 2 PUFF: 220 AEROSOL, METERED RESPIRATORY (INHALATION) at 20:49

## 2019-03-17 RX ADMIN — ALUMINUM HYDROXIDE, MAGNESIUM HYDROXIDE, AND DIMETHICONE 30 ML: 400; 400; 40 SUSPENSION ORAL at 13:41

## 2019-03-17 RX ADMIN — Medication 500000 UNITS: at 08:47

## 2019-03-17 RX ADMIN — MYCOPHENOLATE MOFETIL 1000 MG: 200 POWDER, FOR SUSPENSION ORAL at 20:56

## 2019-03-17 RX ADMIN — MELATONIN TAB 3 MG 3 MG: 3 TAB at 20:51

## 2019-03-17 RX ADMIN — PREDNISONE 15 MG: 10 TABLET ORAL at 08:48

## 2019-03-17 RX ADMIN — Medication 500000 UNITS: at 13:41

## 2019-03-17 RX ADMIN — MESALAMINE 2400 MG: 800 TABLET, DELAYED RELEASE ORAL at 08:49

## 2019-03-17 RX ADMIN — Medication 500000 UNITS: at 17:45

## 2019-03-17 RX ADMIN — PREDNISONE 15 MG: 10 TABLET ORAL at 17:46

## 2019-03-17 RX ADMIN — ACETAMINOPHEN 650 MG: 325 TABLET, FILM COATED ORAL at 01:31

## 2019-03-17 RX ADMIN — Medication 5 ML: at 08:47

## 2019-03-17 RX ADMIN — Medication 1 PACKET: at 05:05

## 2019-03-17 RX ADMIN — Medication 500000 UNITS: at 20:56

## 2019-03-17 RX ADMIN — ONDANSETRON 4 MG: 4 TABLET, ORALLY DISINTEGRATING ORAL at 00:27

## 2019-03-17 RX ADMIN — SENNOSIDES AND DOCUSATE SODIUM 2 TABLET: 8.6; 5 TABLET ORAL at 09:14

## 2019-03-17 RX ADMIN — Medication 1 PACKET: at 00:17

## 2019-03-17 RX ADMIN — DULOXETINE 20 MG: 20 CAPSULE, DELAYED RELEASE ORAL at 08:48

## 2019-03-17 RX ADMIN — Medication 1 PACKET: at 17:46

## 2019-03-17 RX ADMIN — Medication 1 PACKET: at 13:34

## 2019-03-17 RX ADMIN — TACROLIMUS 1.5 MG: 1 CAPSULE ORAL at 17:46

## 2019-03-17 RX ADMIN — HEPARIN SODIUM 950 UNITS/HR: 10000 INJECTION, SOLUTION INTRAVENOUS at 11:10

## 2019-03-17 RX ADMIN — SILDENAFIL 20 MG: 20 TABLET ORAL at 13:33

## 2019-03-17 RX ADMIN — TACROLIMUS 1.5 MG: 1 CAPSULE ORAL at 08:49

## 2019-03-17 ASSESSMENT — ACTIVITIES OF DAILY LIVING (ADL)
ADLS_ACUITY_SCORE: 14
ADLS_ACUITY_SCORE: 13
ADLS_ACUITY_SCORE: 14

## 2019-03-17 ASSESSMENT — PAIN DESCRIPTION - DESCRIPTORS
DESCRIPTORS: DISCOMFORT
DESCRIPTORS: ACHING
DESCRIPTORS: DISCOMFORT
DESCRIPTORS: ACHING;DISCOMFORT
DESCRIPTORS: DISCOMFORT
DESCRIPTORS: ACHING;DISCOMFORT

## 2019-03-17 ASSESSMENT — MIFFLIN-ST. JEOR: SCORE: 1358.35

## 2019-03-17 NOTE — PLAN OF CARE
VSS, except low BPs this AM, SR 80s-90s.  A&Ox4, on RA.  Sepsis protocol triggered this AM.  Lactic 1.3.  Service aware.  Pt asymptomatic.  Pt complains of low abdominal and back pain intermittently relieved w/ hot packs and repositioning.  Dobutamine gtt 2.5 mcg/kg/min (2.1 ml/hr).  PCU collect.  Heparin gtt 950 units/hr.  Xa recheck in AM.  Cycled TF from 8p-8a.  R chest hematoma with pressure dressing in place.  Pt up w/1, ambulating to bathroom.  MATA.  Dressing changed on L forearm skin tear.  Activity and oral intake encouraged.  Mynor counts through 3/17.  Blood sugars assessed, without need for sliding scale insulin.  Pt having multiple loose BMs.  Stool sample to be collected.  Plan for pt to be NPO at midnight for RHC/biopsy tomorrow.  Possible HD line placement 3/20.  Will continue to monitor and notify CVTS with any concerns or questions.

## 2019-03-17 NOTE — PROGRESS NOTES
ASSESSMENT: Everton Larios is a 53 year old male with PMH etoh abuse, hypothyroidism, intermittent asthma, ulcerative colitis, Depression, Chronic HFrEF, NICM admitted with cardiogenic shock requiring subclavian balloon pump now s/p OHT 2/24/19 with [facility] and [facility].       No acute events overnight. Appears comfortable. Pain controlled. Vitals reviewed and within normal limits. Some softer pressures this AM. Lowest weight today recorded since admission.     PLAN:  Neuro/ pain/ sedation:  - Monitor neurological status. Notify the MD for any acute changes in exam.  - Tylenol prn for pain.       #ICU delirium, improving  - Head CT 3/9 negative  - Optimize sleep/wake cycle, melatonin for sleep      #Depression  - PTA cymbalta 20mg     Pulmonary care:   - Doing well on room air  - Supp oxygen to maintain SpO2 >92%  - Encourage IS and deep breathing  - Chest tubes all removed.     #Intermittent asthma  - PTA Montelukast ordered     Cardiovascular: HR goal >95. Echo 3/5 with good LV function, mild decreased RV function (moderately dilated).  - R heart cath and biopsy 3/4 and 3/11, biopsies negative for rejection. Next scheduled 3/18.  - Monitor hemodynamic status.   - MAP >65. HR goal > 95.  - Dobutamine 2.5 mcg/kg/min, sildenafil 20 q8h   - Continue statin. No aspirin per cards.   - Pacing wires removed 3/13.      GI care:   #Severe malnutrition  - Regular diet  - Cycle TF.   - Calorie counts ongoing.  - Fiber given diarrhea.   - PRN imodium      #Intraperitoneal free air (CT obtained 2/26)   - Small peritoneal defect made during redo operation, closed with stitches intra-op- likely source of free air. Resolved.     #Pierce's esophagus  -ranitidine    #Ulcerative colitis  -PTA Mesalamine     Fluids/ Electrolytes/ Nutrition:   - TKO for IV fluid hydration  - TF for enteral nutritional support as above.  - No indication for parenteral nutrition.  - Replace electrolytes as needed     #Hyponatremia  -  Asymptomatic/improved with dialysis     Renal/ Fluid Balance:   #FRANKLIN, oliguric.    - Etiology likely cardiorenal from venous congestion vs nephrotoxicity from multiple medications  - CRRT started (3/9), transitioned to iHD 3/14. Nephrology following, appreciate recommendations  - Continue TTS iHD schedule  - Will continue to monitor intake and output.  - PTA Tamsulosin      Endocrine:    # Stress hyperglycemia  -SSI  # hypothyroidism  - Home levothyroxine      ID/ Antibiotics: Perioperative antibiotics vanc/zosyn x 3 days- completed  #Leukocytosis, persistent since OR 2/24, afebrile   - BCx 3/9 x2 NGTD, CDiff 3/10/2019 negative  - BC and swan tip cultured 3/4 for leukocytosis- NGTD.   - Empirically started on Vanc (3/9-3/11). Zosyn (3/9-3/14), switched to cefepime (3/14-3/15) due to thrombocytopenia. Antibiotics completed 3/15.  - holding Valcyte for CMV prophylaxis; holding bactrim for PCP prophylaxis. Pentamidine given on 3/4.   - Nystatin for thrush     Heme:     #Bypass of persistent left SVC to R atrial appendage with [place] graft. Will require lifelong anti-coagulation for graft  - Low intensity heparin gtt    #Anemia, hemolysis unlikely, peripheral smear unremarkable  - Hgb goal >7. Daily CBC.  - Hgb 7.6, no s/s bleeding    #Thrombocytopenia, drug induced vs marrow suppression, HIT negative  - Heme consult, appreciate recs.   - Zosyn stopped, Valcyte held.  - Plt 40K, will transfuse if <30K, sign of bleeding or impending procedure.  - If Plt <20K, will send post-transfusion purpura screen and IVIG x 2 days per heme.     Prophylaxis:    - Mechanical prophylaxis for DVT  - Heparin as above  - Zantac qd     Lines/ tubes/ drains:  - RIJ HD line 3/8, LUE PICC 3/9      Disposition:  Floor

## 2019-03-17 NOTE — PROGRESS NOTES
Brief Nephrology Note     EMR reviewed. Vitals and Labs reviewed. Tolerated HD and achieved UF goal. Will f/u UOP. Will plan for HD Tuesday 3/19 per TTS schedule.      Please call with Qs.      Merlin Herbert MD   [phone]

## 2019-03-17 NOTE — PROGRESS NOTES
ADVANCED HEART FAILURE PROGRESS NOTE    SUBJECTIVE:  Yesterday patient developed some crampy right lower quadrant abdominal discomfort that is somewhat better this morning but still persistent. He continues to have loose non-bloody stools unchanged over the past week. Minimal nausea. No fevers or chills.     ROS otherwise negative.    OBJECTIVE:  Vital signs:  Temp: 98.6  F (37  C) Temp  Min: 98  F (36.7  C)  Max: 98.7  F (37.1  C)  Heart Rate: 93 Heart Rate  Min: 91  Max: 98  BP: 104/83 Systolic (24hrs), Av , Min:94 , Max:107   Diastolic (24hrs), Av, Min:51, Max:83    Resp: 18 Resp  Min: 15  Max: 23  SpO2: 99 % SpO2  Min: 95 %  Max: 100 %      Intake/Output Summary (Last 24 hours) at 3/17/2019 0733  Last data filed at 3/17/2019 0623  Gross per 24 hour   Intake 2440.61 ml   Output 2650 ml   Net -209.39 ml       Vitals:    19 1000 19 1600 19 0500   Weight: 56.6 kg (124 lb 12.5 oz) 55.2 kg (121 lb 11.1 oz) 54.9 kg (121 lb)       Physical Exam:  GEN:  no distress, sitting in chair  CV:  Regular rate and rhythm, no murmur.   CHEST: Subclavian incisions sutured.  Chest tubes. Hematoma at right subclavian site slightly enlarged over the past week with no drainage or erythema  LUNGS:  normal work of breathing, Clear to auscultation bilaterally, no wheezes or crackles.   ABD:  hyperactive bowel sounds, soft, mild RLQ tenderness, nondistended  EXT:  No edema or cyanosis. Normal distal puses  NEURO: no focal deficits      Medications:    IV fluid REPLACEMENT ONLY       DOBUTamine 2.5 mcg/kg/min (19)     HEParin 950 Units/hr (19)     - MEDICATION INSTRUCTIONS -       - MEDICATION INSTRUCTIONS -       BETA BLOCKER NOT PRESCRIBED         Current Facility-Administered Medications   Medication Dose Route Frequency     B and C vitamin Complex with folic acid  5 mL Per Feeding Tube Daily     DULoxetine  20 mg Oral Daily     fiber modular (NUTRISOURCE FIBER)  1 packet Per Feeding Tube  Q6H     fluticasone  2 puff Inhalation Q12H     insulin aspart  1-6 Units Subcutaneous Q4H     levothyroxine  100 mcg Oral Daily     lidocaine  1 patch Transdermal Q24H     lidocaine   Transdermal Q8H     lidocaine   Transdermal Q24h     melatonin  3 mg Oral At Bedtime     mesalamine  2,400 mg Oral BID     montelukast  10 mg Oral At Bedtime     mycophenolate  1,000 mg Oral or Feeding Tube BID     nystatin  500,000 Units Oral 4x Daily     predniSONE  15 mg Oral BID w/meals     ranitidine  150 mg Oral Daily     rosuvastatin  10 mg Oral Daily     senna-docusate  1 tablet Oral BID    Or     senna-docusate  2 tablet Oral BID     sildenafil  20 mg Oral TID     sodium chloride (PF)  3 mL Intracatheter Q8H     tacrolimus  1.5 mg Oral BID IS         Labs:  CMP  Recent Labs   Lab 03/17/19 0455 03/16/19 0416 03/15/19  0409  03/14/19  1653 03/14/19  0329    138 137   < >  --  137   POTASSIUM 3.8 3.5 3.6   < >  --  3.8   CHLORIDE 102 108 105   < >  --  106   CO2 20 19* 22   < >  --  23   BUN 30 35* 18   < >  --  33*   CR 1.44* 1.79* 1.44*   < >  --  1.68*   RADAMES 7.6* 7.7* 7.4*   < >  --  7.4*   MAG 2.1 1.8  --   --   --  2.2   PHOS 2.6 1.9* 2.7  --   --  1.6*   * 221* 136*   < >  --  197*   LDH  --   --   --   --  320*  --    ALKPHOS  --   --  88  --   --  97   BILITOTAL  --   --  0.8  --   --  0.6   AST  --   --  20  --   --  23   ALT  --   --  24  --   --  23    < > = values in this interval not displayed.     BLOOD GASNo lab results found in last 7 days.  CBC  Recent Labs   Lab 03/17/19 0455 03/16/19 0416   WBC 16.2* 15.7*   HGB 7.6* 8.0*   HCT 24.6* 25.8*   PLT 43* 40*     COAG  Recent Labs   Lab 03/17/19 0455 03/16/19 0416  03/14/19  1653   INR 1.25* 1.25*   < > 1.36*   PTT  --   --   --  46*    < > = values in this interval not displayed.         ASSESSMENT/PLAN:  Everton Larios is a 54 yo gentleman who underwent orthotopic heart transplant 2/24/19 for non ischemic cardiomyopathy and cardiogenic  shock. Post-op complicated by RV dysfunction and FRANKLIN.      Updates today  - will check Cdiff and LFTs for abdominal pain  - if abd pain persists consider abdominal CT  - plan for RHC/biopsy Monday or Tuesday      #Oliguric renal failure:  Etiology cardiorenal from venous congestion vs. nephrotoxins.  - iHD per renal  - nephrology consulted, appreciate assistance  - plan for tunneled line later this week, will need to be placed on right side given left-side goretex graft     #Sepsis  - completed antibiotics cefepime/zosyn for 7 day course (ending 3/16/19)  - cultures no growth to date     #S/p OHT on 2/24:  Graft function:  - echo 3/1 shows normal LV function, RV is mild to moderately dilated and hypokinetic  - dobutamine at 2.5 for continued RV and hemodynamic support, will eval hemodynamics at next biopsy and consider weaning down  - continue sildenafil 20 q8h     #Thrombocytopenia/Anemia:  Per hematology this is felt to be most likely medication induced  - Changed to cefepime from zosyn  - holding valcyte     Immunosuppression:  - on MMF decreased at 1000 BID  - Prednisone 15mg BID, decreasing by 5mg daily with negative biopsies  - NOT on Simulect protocol  - tacrolimus 1.5mg BID (goal 8-10)     Prophylaxis:  - CMV +/+: medium risk, Valcyte held given thrombocytopenia/anemia, will check weekly CMV titers  - PCP: holding Bactrim for now, pentamadine given 3/3  - Thrush: improved on nystatin  - GI: on PPI  - CAV: Crestor, holding ASA given bleeding at line sites     Biopsies:  - First biopsy 3/4, 0R, no AMR  - Second biopsy 3/11, 0R, no AMR  - Third biopsy due 3/18     Goretex conduit for persistent left SVC:  - will need anticoagulation, on low intensity heparin  **NO CENTRAL VENOUS CATHETERS ON THE LEFT UPPER BODY**      Seen and staffed with .        Trev Harris MD  Advanced Heart Failure Fellow  [phone]

## 2019-03-17 NOTE — PLAN OF CARE
Neuro: A&Ox4.   Cardiac: SR. VSS.   Respiratory: Sating 98% on RA. MATA.   GI/: Oliguria. Small liquid BM X1. NG.   Diet/appetite: Tolerating 2L FR diet. Cyclic TF 8p-8a at 80 ml/hr. Q4 BG.   Activity:  Assist of 1 up to commode.  Pain: At acceptable level on current regimen. Tylenol given x1.   Skin: No new deficits noted.  LDA's: L PICC with dobutamine at 2.5 mcg/kg/min. PIV with heparin at 950 unit(s)/hr. NG.    Plan: Continue with POC. Notify primary team with changes.

## 2019-03-17 NOTE — PLAN OF CARE
D: Heart Transplant POD 21    I/A: VSS. Patient c/o abd discomfort in lower right abdomen which has been there since dialysis yesterday. PRN Tylenol given q 4 hours. Patient also trying hot packs this shift. Sinus rhythm/sinus tach. Tube feeds continue at 80 ml/hr. Off at 0800. PRN Zofran given at midnight for mild nausea. This AM nausea continues. MD paged at 0500 for additional antiemetic orders but no call back received. Tube feed held for one hour and patient had medium stool on commode which helped. Heparin drip continues at 950 unit(s)/hr. Dobutamine drip continues at 2.5 mcg/kg/min. Blood sugars monitored. Small amount of urine output--patient on dialysis. Moving well but with MATA. Dressing changed on Left forearm skin tear.    P: Right heart cath Monday. Tunneled dialysis cath placement planned for 3/20. Encourage oral intake. Continue to monitor.

## 2019-03-17 NOTE — PROGRESS NOTES
Calorie Count  Intake recorded for: 3/16  Total Kcals: 0 Total Protein: 0g  Kcals from Hospital Food: 0   Protein: 0g  Kcals from Outside Food (average):0 Protein: 0g  # Meals Recorded: 2 meals ordered from kitchen, no intake recorded.   # Supplements Recorded: no intake recorded.

## 2019-03-17 NOTE — PROGRESS NOTES
Transfer        7:15 PM  ---------------------------------------------------------------------  Transferred to/from: 4A to 6C 411  Via: Bed  Reason for transfer: Improving in condition  Family: Aware of transfer  Belongings: Sent with pt  Chart: Sent with pt  Medications: Meds from bin sent with pt  Report called from: MYRTLE Sawant RN    Temp:  [97.6  F (36.4  C)-98.7  F (37.1  C)] 98.5  F (36.9  C)  Heart Rate:  [] 93  Resp:  [15-23] 18  BP: ()/(51-72) 101/62  SpO2:  [95 %-100 %] 100 %

## 2019-03-18 LAB
ALBUMIN SERPL-MCNC: 2.6 G/DL (ref 3.4–5)
ALP SERPL-CCNC: 137 U/L (ref 40–150)
ALT SERPL W P-5'-P-CCNC: 57 U/L (ref 0–70)
AMYLASE SERPL-CCNC: 51 U/L (ref 30–110)
ANION GAP SERPL CALCULATED.3IONS-SCNC: 11 MMOL/L (ref 3–14)
AST SERPL W P-5'-P-CCNC: 36 U/L (ref 0–45)
BILIRUB DIRECT SERPL-MCNC: 0.4 MG/DL (ref 0–0.2)
BILIRUB SERPL-MCNC: 0.8 MG/DL (ref 0.2–1.3)
BUN SERPL-MCNC: 52 MG/DL (ref 7–30)
CALCIUM SERPL-MCNC: 8 MG/DL (ref 8.5–10.1)
CHLORIDE SERPL-SCNC: 100 MMOL/L (ref 94–109)
CMV DNA SPEC NAA+PROBE-ACNC: NORMAL [IU]/ML
CMV DNA SPEC NAA+PROBE-LOG#: NORMAL {LOG_IU}/ML
CO2 SERPL-SCNC: 20 MMOL/L (ref 20–32)
CREAT SERPL-MCNC: 2.04 MG/DL (ref 0.66–1.25)
EPO SERPL-ACNC: 11 MU/ML (ref 4–27)
ERYTHROCYTE [DISTWIDTH] IN BLOOD BY AUTOMATED COUNT: 17.9 % (ref 10–15)
GFR SERPL CREATININE-BSD FRML MDRD: 35 ML/MIN/{1.73_M2}
GLUCOSE BLDC GLUCOMTR-MCNC: 110 MG/DL (ref 70–99)
GLUCOSE BLDC GLUCOMTR-MCNC: 119 MG/DL (ref 70–99)
GLUCOSE BLDC GLUCOMTR-MCNC: 145 MG/DL (ref 70–99)
GLUCOSE BLDC GLUCOMTR-MCNC: 178 MG/DL (ref 70–99)
GLUCOSE BLDC GLUCOMTR-MCNC: 181 MG/DL (ref 70–99)
GLUCOSE BLDC GLUCOMTR-MCNC: 202 MG/DL (ref 70–99)
GLUCOSE SERPL-MCNC: 200 MG/DL (ref 70–99)
HCT VFR BLD AUTO: 24.4 % (ref 40–53)
HGB BLD-MCNC: 7.7 G/DL (ref 13.3–17.7)
INR PPP: 1.28 (ref 0.86–1.14)
LIPASE SERPL-CCNC: 237 U/L (ref 73–393)
LMWH PPP CHRO-ACNC: 0.28 IU/ML
MAGNESIUM SERPL-MCNC: 2.1 MG/DL (ref 1.6–2.3)
MCH RBC QN AUTO: 30.4 PG (ref 26.5–33)
MCHC RBC AUTO-ENTMCNC: 31.6 G/DL (ref 31.5–36.5)
MCV RBC AUTO: 96 FL (ref 78–100)
PHOSPHATE SERPL-MCNC: 3 MG/DL (ref 2.5–4.5)
PLATELET # BLD AUTO: 46 10E9/L (ref 150–450)
POTASSIUM SERPL-SCNC: 4.1 MMOL/L (ref 3.4–5.3)
PROT SERPL-MCNC: 5.3 G/DL (ref 6.8–8.8)
RBC # BLD AUTO: 2.53 10E12/L (ref 4.4–5.9)
SODIUM SERPL-SCNC: 132 MMOL/L (ref 133–144)
SPECIMEN SOURCE: NORMAL
TACROLIMUS BLD-MCNC: 6.8 UG/L (ref 5–15)
TME LAST DOSE: NORMAL H
WBC # BLD AUTO: 13.7 10E9/L (ref 4–11)

## 2019-03-18 PROCEDURE — 00000146 ZZHCL STATISTIC GLUCOSE BY METER IP

## 2019-03-18 PROCEDURE — 94640 AIRWAY INHALATION TREATMENT: CPT | Mod: 76

## 2019-03-18 PROCEDURE — 25000132 ZZH RX MED GY IP 250 OP 250 PS 637: Performed by: SURGERY

## 2019-03-18 PROCEDURE — 25000125 ZZHC RX 250: Performed by: INTERNAL MEDICINE

## 2019-03-18 PROCEDURE — 21400000 ZZH R&B CCU UMMC

## 2019-03-18 PROCEDURE — 25000132 ZZH RX MED GY IP 250 OP 250 PS 637: Performed by: INTERNAL MEDICINE

## 2019-03-18 PROCEDURE — 83690 ASSAY OF LIPASE: CPT | Performed by: STUDENT IN AN ORGANIZED HEALTH CARE EDUCATION/TRAINING PROGRAM

## 2019-03-18 PROCEDURE — 25000131 ZZH RX MED GY IP 250 OP 636 PS 637: Performed by: INTERNAL MEDICINE

## 2019-03-18 PROCEDURE — 83735 ASSAY OF MAGNESIUM: CPT | Performed by: STUDENT IN AN ORGANIZED HEALTH CARE EDUCATION/TRAINING PROGRAM

## 2019-03-18 PROCEDURE — 25000131 ZZH RX MED GY IP 250 OP 636 PS 637: Performed by: HOSPITALIST

## 2019-03-18 PROCEDURE — 99233 SBSQ HOSP IP/OBS HIGH 50: CPT | Mod: GC | Performed by: INTERNAL MEDICINE

## 2019-03-18 PROCEDURE — 85027 COMPLETE CBC AUTOMATED: CPT | Performed by: STUDENT IN AN ORGANIZED HEALTH CARE EDUCATION/TRAINING PROGRAM

## 2019-03-18 PROCEDURE — 94640 AIRWAY INHALATION TREATMENT: CPT

## 2019-03-18 PROCEDURE — 80197 ASSAY OF TACROLIMUS: CPT | Performed by: INTERNAL MEDICINE

## 2019-03-18 PROCEDURE — 25000125 ZZHC RX 250: Performed by: PHYSICIAN ASSISTANT

## 2019-03-18 PROCEDURE — 80048 BASIC METABOLIC PNL TOTAL CA: CPT | Performed by: STUDENT IN AN ORGANIZED HEALTH CARE EDUCATION/TRAINING PROGRAM

## 2019-03-18 PROCEDURE — 84100 ASSAY OF PHOSPHORUS: CPT | Performed by: STUDENT IN AN ORGANIZED HEALTH CARE EDUCATION/TRAINING PROGRAM

## 2019-03-18 PROCEDURE — 85610 PROTHROMBIN TIME: CPT | Performed by: STUDENT IN AN ORGANIZED HEALTH CARE EDUCATION/TRAINING PROGRAM

## 2019-03-18 PROCEDURE — 82150 ASSAY OF AMYLASE: CPT | Performed by: STUDENT IN AN ORGANIZED HEALTH CARE EDUCATION/TRAINING PROGRAM

## 2019-03-18 PROCEDURE — 25000132 ZZH RX MED GY IP 250 OP 250 PS 637: Performed by: STUDENT IN AN ORGANIZED HEALTH CARE EDUCATION/TRAINING PROGRAM

## 2019-03-18 PROCEDURE — 80076 HEPATIC FUNCTION PANEL: CPT | Performed by: STUDENT IN AN ORGANIZED HEALTH CARE EDUCATION/TRAINING PROGRAM

## 2019-03-18 PROCEDURE — 85520 HEPARIN ASSAY: CPT | Performed by: STUDENT IN AN ORGANIZED HEALTH CARE EDUCATION/TRAINING PROGRAM

## 2019-03-18 PROCEDURE — 40000275 ZZH STATISTIC RCP TIME EA 10 MIN

## 2019-03-18 PROCEDURE — 25000128 H RX IP 250 OP 636: Performed by: PHYSICIAN ASSISTANT

## 2019-03-18 PROCEDURE — 25000131 ZZH RX MED GY IP 250 OP 636 PS 637: Performed by: STUDENT IN AN ORGANIZED HEALTH CARE EDUCATION/TRAINING PROGRAM

## 2019-03-18 PROCEDURE — 27210429 ZZH NUTRITION PRODUCT INTERMEDIATE LITER

## 2019-03-18 RX ORDER — ALBUTEROL SULFATE 0.83 MG/ML
2.5 SOLUTION RESPIRATORY (INHALATION) 2 TIMES DAILY
Status: DISPENSED | OUTPATIENT
Start: 2019-03-18 | End: 2019-03-19

## 2019-03-18 RX ORDER — TACROLIMUS 1 MG/1
2 CAPSULE ORAL
Status: DISCONTINUED | OUTPATIENT
Start: 2019-03-18 | End: 2019-03-26

## 2019-03-18 RX ORDER — ONDANSETRON 2 MG/ML
4 INJECTION INTRAMUSCULAR; INTRAVENOUS EVERY 6 HOURS PRN
Status: DISCONTINUED | OUTPATIENT
Start: 2019-03-18 | End: 2019-04-03 | Stop reason: HOSPADM

## 2019-03-18 RX ADMIN — PREDNISONE 15 MG: 10 TABLET ORAL at 08:43

## 2019-03-18 RX ADMIN — Medication 500000 UNITS: at 16:16

## 2019-03-18 RX ADMIN — FLUTICASONE PROPIONATE 2 PUFF: 220 AEROSOL, METERED RESPIRATORY (INHALATION) at 20:34

## 2019-03-18 RX ADMIN — MYCOPHENOLATE MOFETIL 1000 MG: 200 POWDER, FOR SUSPENSION ORAL at 20:33

## 2019-03-18 RX ADMIN — MELATONIN TAB 3 MG 3 MG: 3 TAB at 20:34

## 2019-03-18 RX ADMIN — BENZOCAINE, MENTHOL 1 LOZENGE: 15; 3.6 LOZENGE ORAL at 11:18

## 2019-03-18 RX ADMIN — ONDANSETRON 4 MG: 2 INJECTION INTRAMUSCULAR; INTRAVENOUS at 09:36

## 2019-03-18 RX ADMIN — SILDENAFIL 20 MG: 20 TABLET ORAL at 13:41

## 2019-03-18 RX ADMIN — SILDENAFIL 20 MG: 20 TABLET ORAL at 20:35

## 2019-03-18 RX ADMIN — SILDENAFIL 20 MG: 20 TABLET ORAL at 08:43

## 2019-03-18 RX ADMIN — ONDANSETRON 4 MG: 4 TABLET, ORALLY DISINTEGRATING ORAL at 00:08

## 2019-03-18 RX ADMIN — PREDNISONE 15 MG: 10 TABLET ORAL at 18:12

## 2019-03-18 RX ADMIN — ROSUVASTATIN CALCIUM 10 MG: 10 TABLET, FILM COATED ORAL at 08:43

## 2019-03-18 RX ADMIN — LOPERAMIDE HYDROCHLORIDE 2 MG: 2 CAPSULE ORAL at 11:18

## 2019-03-18 RX ADMIN — Medication 500000 UNITS: at 20:36

## 2019-03-18 RX ADMIN — ACETAMINOPHEN 650 MG: 325 TABLET, FILM COATED ORAL at 09:56

## 2019-03-18 RX ADMIN — MESALAMINE 2400 MG: 800 TABLET, DELAYED RELEASE ORAL at 08:43

## 2019-03-18 RX ADMIN — FLUTICASONE PROPIONATE 2 PUFF: 220 AEROSOL, METERED RESPIRATORY (INHALATION) at 08:40

## 2019-03-18 RX ADMIN — DULOXETINE 20 MG: 20 CAPSULE, DELAYED RELEASE ORAL at 08:43

## 2019-03-18 RX ADMIN — ACETAMINOPHEN 650 MG: 325 TABLET, FILM COATED ORAL at 00:12

## 2019-03-18 RX ADMIN — MONTELUKAST SODIUM 10 MG: 10 TABLET, COATED ORAL at 21:38

## 2019-03-18 RX ADMIN — Medication 500000 UNITS: at 08:44

## 2019-03-18 RX ADMIN — TACROLIMUS 1.5 MG: 1 CAPSULE ORAL at 08:43

## 2019-03-18 RX ADMIN — SIMETHICONE CHEW TAB 80 MG 80 MG: 80 TABLET ORAL at 00:08

## 2019-03-18 RX ADMIN — ACETAMINOPHEN 650 MG: 325 TABLET, FILM COATED ORAL at 05:15

## 2019-03-18 RX ADMIN — MELATONIN TAB 3 MG 3 MG: 3 TAB at 00:13

## 2019-03-18 RX ADMIN — MESALAMINE 2400 MG: 800 TABLET, DELAYED RELEASE ORAL at 20:34

## 2019-03-18 RX ADMIN — Medication 500000 UNITS: at 13:41

## 2019-03-18 RX ADMIN — RANITIDINE 150 MG: 150 TABLET ORAL at 08:43

## 2019-03-18 RX ADMIN — MYCOPHENOLATE MOFETIL 1000 MG: 200 POWDER, FOR SUSPENSION ORAL at 08:43

## 2019-03-18 RX ADMIN — LEVOTHYROXINE SODIUM 100 MCG: 100 TABLET ORAL at 08:43

## 2019-03-18 RX ADMIN — ALBUTEROL SULFATE 2.5 MG: 2.5 SOLUTION RESPIRATORY (INHALATION) at 21:10

## 2019-03-18 RX ADMIN — TACROLIMUS 2 MG: 1 CAPSULE ORAL at 18:12

## 2019-03-18 RX ADMIN — Medication 5 ML: at 08:43

## 2019-03-18 ASSESSMENT — ACTIVITIES OF DAILY LIVING (ADL)
ADLS_ACUITY_SCORE: 13
ADLS_ACUITY_SCORE: 11
ADLS_ACUITY_SCORE: 14
ADLS_ACUITY_SCORE: 13

## 2019-03-18 ASSESSMENT — MIFFLIN-ST. JEOR: SCORE: 1368.33

## 2019-03-18 NOTE — PROGRESS NOTES
CVTS Daily Note  3/18/2019  Attending: Yves Rodrigues MD    S:   No overnight events. Nephrology following for oliguria and T/Th/Sa hemodialysis.   Dobutamine gtt continues.   Holding tube feeds for bloated abdomen.   Heart biopsy tomorrow (NPO at midnight).     Pt seen at bedside resting comfortably.    Does complain of abdominal distension, otherwise no acute complaints.      Denies F/C/Sweats.  No CP, SOB, or calf pain.    Tolerating diet (800 calories 3/17) and tube feeds.  + loose BM.      Ambulated well with less assistance.    Pain level tolerable. Plan as per Neuro section below.     - 7 kg since admit and trending stable    O:   Vitals:    03/17/19 2340 03/18/19 0246 03/18/19 0455 03/18/19 0717   BP: 101/60  102/72 100/54   BP Location: Right arm  Right arm Right arm   Pulse:       Resp: 18 18 18 16   Temp: 98.5  F (36.9  C)   98.1  F (36.7  C)   TempSrc: Oral   Oral   SpO2: 99% 99% 99% 99%   Weight:  55.9 kg (123 lb 3.2 oz)     Height:         Vitals:    03/16/19 1600 03/17/19 0500 03/18/19 0246   Weight: 55.2 kg (121 lb 11.1 oz) 54.9 kg (121 lb) 55.9 kg (123 lb 3.2 oz)       Intake/Output Summary (Last 24 hours) at 3/18/2019 1128  Last data filed at 3/18/2019 1100  Gross per 24 hour   Intake 1970.07 ml   Output 376 ml   Net 1594.07 ml       MAPs: 66 - 82    Gen: AAO x 3, pleasant, NAD  CV: RRR, S1S2 normal, no murmurs, rubs, or gallops. Right chest fluid collection, firm, fluctuant, mild tenderness.  Pulm: CTA, no rhonchi or wheezes  Abd: soft, non-tender, no guarding  Ext: no peripheral edema  Incision: clean, dry, intact, no erythema  Chest Tube sites: dressings clean and dry      Labs:  BMP  Recent Labs   Lab 03/18/19  0505 03/17/19  0455 03/16/19  0416 03/15/19  0409   * 135 138 137   POTASSIUM 4.1 3.8 3.5 3.6   CHLORIDE 100 102 108 105   RADAMES 8.0* 7.6* 7.7* 7.4*   CO2 20 20 19* 22   BUN 52* 30 35* 18   CR 2.04* 1.44* 1.79* 1.44*   * 221* 221* 136*     CBC  Recent Labs   Lab  03/18/19  0505 03/17/19  0455 03/16/19  0416 03/15/19  0800   WBC 13.7* 16.2* 15.7* 16.7*   RBC 2.53* 2.54* 2.73* 2.73*   HGB 7.7* 7.6* 8.0* 8.3*   HCT 24.4* 24.6* 25.8* 25.8*   MCV 96 97 95 95   MCH 30.4 29.9 29.3 30.4   MCHC 31.6 30.9* 31.0* 32.2   RDW 17.9* 17.7* 17.5* 17.1*   PLT 46* 43* 40* 37*     INR  Recent Labs   Lab 03/18/19  0505 03/17/19  0455 03/16/19  0416 03/14/19 2116   INR 1.28* 1.25* 1.25* 1.33*      Hepatic Panel   Lab Results   Component Value Date    AST 36 03/18/2019     Lab Results   Component Value Date    ALT 57 03/18/2019     Lab Results   Component Value Date    ALBUMIN 2.6 03/18/2019     GLUCOSE:   Recent Labs   Lab 03/18/19  0817 03/18/19  0509 03/18/19  0505 03/17/19  2349 03/17/19  2043 03/17/19  1601 03/17/19  0955 03/17/19  0455  03/16/19  0416  03/15/19  0409 03/14/19  2116  03/14/19  0329   GLC  --   --  200*  --   --   --   --  221*  --  221*  --  136* 103*  --  197*   * 202*  --  178* 134* 102* 98 212*   < >  --    < >  --   --    < >  --     < > = values in this interval not displayed.       Imaging:  reviewed recent imaging      A/P:  Everton Larios is a 53 year old male with PMH etoh abuse, hypothyroidism, intermittent asthma, ulcerative colitis, Depression, Chronic HFrEF, NICM admitted with cardiogenic shock requiring subclavian balloon pump. He is now s/p OHT 2/24/19 with [facility] and [facility].    - First biopsy 3/4, 0R, no AMR  - Second biopsy 3/11, 0R, no AMR  - Third biopsy due 3/18    Neuro:   - Neuro intact. ICU delirium improving. Head CT 3/9 without acute pathology.   - Hx Depression: pta cymbalta  - Sleep: melatonin   - Tylenol and oxycodone    CV:   - Hx of NICM, now S/P heart transplant 2/24  - No arrhythmia, HD stable.  - ASA 81 mg, Crestor  - Dobutamine 2.5 mcg/kg/min for mild decreased RV function. Sildenafil 20 mg TID.     Pulm:   - Hx intermittent asthma, pta Singulair.   - Pulm toilet, IS, activity and deep breathing   - Supplemental O2 PRN to  keep sats > 92%. Wean off as tolerated.    ID:   - WBC 13/7, afebrile, no signs or symptoms of infection. Completed 7 days cefepime/zosyn 3/16.  - Blood cultures 3/13: NGTD.  C diff negative 3/17.      GI / FEN:  - Reg diet, bowel regimen. Calorie counts.   - Tolerating tube feeds continuous but off 3/18 for abd distension  - Consider cycling tube feeds 3/19 or change formula?    - Hx ulcerative colitis; pta mesalamine     Renal / :   - Creatinine 2.04, adequate UOP  - Nephrology following for oliguria and T/Th/Sa hemodialysis. Planning tunneled HD line 3/20.    Heme / Anticoagulation:  - Hgb 7.7, Plts 46  - Holding Hep gtt 3/18. Has R chest hematoma that may interfere with HD tunneled catheter placement    Endo:   - Hypothyroid: levothyroxine 100 mcg daily     PPX:   - Protonix    Dispo:   - 6C since 3/16  - Needs HD line and schedule, needs NJ removed      Discussed with CVTS Fellow   Staff surgeons have been informed of changes through both  verbal and written communication.      Arcadio Blackburn PA-C  Cardiothoracic Surgery  Pager [phone]    11:28 AM   March 18, 2019

## 2019-03-18 NOTE — PROGRESS NOTES
CLINICAL NUTRITION SERVICES - REASSESSMENT NOTE     Nutrition Prescription    RECOMMENDATIONS FOR MDs/PROVIDERS TO ORDER:  1. Recommend order calcium and vitamin D supplement prior to discharge for long-term prednisone use    2. Recommend keeping FT until pt able to consistently consume >1100 kcal and 55 gm protein daily (~2/3 estimated nutrition needs)      Malnutrition Status:    Non-severe malnutrition in the context of acute illness    Recommendations already ordered by Registered Dietitian (RD):  1. Continue oral nutrition supplements as ordered    Future/Additional Recommendations:  1. Pt receiving 1390 mL fluid via FT enteral formula and FWF + Boost Breeze BID. This leaves additional 610 mL fluid/day only to stay within 2000 mL fluid restriction.   2. Monitor calorie counts and wean TF as able  3. If frequency of loose stools worsens, recommend switching to semi-elemental TF formula, Peptamen 1.5 @ 80 mL/hr x 12hrs.      EVALUATION OF THE PROGRESS TOWARD GOALS   Diet: Regular; 2000 mL FR; Boost Breeze and Gelatein BID between meals  Nutrition Support: Nutren 1.5 @ 80 mL/hr for 12 hours to provide: 1440 kcal (26 kcal/kg/day), 65 g PRO (1.2 g/kg/day), 730 mL H2O, 169 g CHO, and no fiber - Only provides partial nutrition needs, reliant on PO to meet remaining.  Intake: Avg EN + PO intake = 1443 kcal/day (26 kcal/kg) and 57 gm protein/day (1 gm/kg). This met 87% of low-end calorie needs and 69% of low-end protein needs.   Avg EN intake over past 7 days = 683 mL formula, providing 1025 kcal (19 kcal/kg) and 46 gm protein (0.8 gm/kg) daily. This met 62% of low-end calorie needs and 55% of low-end protein needs.     Calorie counts: Avg intake over 3 days = 418 kcal/day and 11 gm protein/day. PO intake meeting 25% of low-end calorie needs and 13% of low-end protein needs.    3/17: 854 kcal and 24 g protein  3/16: 0 kcal and 0 gm protein - No intake recorded  3/15: 401 kcal and 8 g protein    Tolerance: Pt has been  feeling bloated/abd discomfort since Saturday, feels like his appetite is coming back today. Only able to tolerate Macedonian toast, pancakes, omelette, cereal, and supplements. Consuming meals BID + supplements between meals.      NEW FINDINGS   3/14 - Pt start HD, TTS schedule  Weight: Pt has lost 13 lbs (9.9%) in the past 1 week.   Labs: K+/Mg++/Phos WNL today.   Meds: Continues Nephronex MVI  GI: Pt reports some ongoing loose stools. Does appear to have improved somewhat after start of fiber modular (3-6 episodes/day --> 1-3 episodes/day). Pt reports fiber may be contributing to increased bloating and abdominal discomfort, refused fiber today.     MALNUTRITION  % Intake: Decreased intake does not meet criteria  % Weight Loss: > 2% in 1 week (severe)  Subcutaneous Fat Loss: Facial region:  mild  Muscle Loss: Temporal, Facial & jaw region, Thoracic region (clavicle, acromium bone, deltoid, trapezius, pectoral), Upper arm (bicep, tricep) and Lower arm  (forearm):  Mild  Fluid Accumulation/Edema: Does not meet criteria  Malnutrition Diagnosis: Non-severe malnutrition in the context of acute illness    Previous Goals   Total avg nutritional intake to meet a minimum of 30 kcal/kg and 1.5 g PRO/kg daily (per dosing wt 55 kg).  Evaluation: Not met    Previous Nutrition Diagnosis  Inadequate protein-energy intake related to poor appetite as evidenced by kcal counts documenting average intake of 3.5 kcal/kg and 0.3 grams of protein/kg and report of poor intake.  Evaluation: Improving    CURRENT NUTRITION DIAGNOSIS  Inadequate protein-energy intake related to frequent interruptions to TF infusion, decreased appetite, abd discomfort and early satiety as evidenced by intake only meeting 26 kcal/kg and 1 gm protein/kg.       INTERVENTIONS  Implementation  Nutrition education for recommended modifications - discussed ability to wean TF pending PO intake and resumption of calorie counts to assess PO intake. Discussed high calorie  and high protein foods to help pt meet PO intake goals, encouraged pt to consume small, frequent meals to help meet PO goals. Discussed alternative supplement options, should he become tired of current order, would like to keep same or now.     Goals  Total avg nutritional intake to meet a minimum of 30 kcal/kg and 1.5 g PRO/kg daily (per dosing wt 55 kg).    Monitoring/Evaluation  Progress toward goals will be monitored and evaluated per protocol.    Gosia Hinds, RD, LD  6C pgr: [phone]

## 2019-03-18 NOTE — PROGRESS NOTES
Calorie Count  Intake recorded for: 3/17  Total Kcals: 854 Total Protein: 24g  Kcals from Hospital Food: 854  Protein: 24g  Kcals from Outside Food (average):0 Protein: 0g  # Meals Recorded: 100% corn flakes w/ milk, 75% Khmer toast w/ syrup, iced sugar cookie   # Supplements Recorded: 0

## 2019-03-18 NOTE — PROGRESS NOTES
ADVANCED HEART FAILURE PROGRESS NOTE    SUBJECTIVE:  No acute events. Continues to have some mild abdominal discomfort.    ROS otherwise negative.    OBJECTIVE:  Vital signs:  Temp: 98.1  F (36.7  C) Temp  Min: 97.9  F (36.6  C)  Max: 98.5  F (36.9  C)  Heart Rate: 91 Heart Rate  Min: 87  Max: 96  BP: 100/54 Systolic (24hrs), Av , Min:95 , Max:109   Diastolic (24hrs), Av, Min:54, Max:72    Resp: 16 Resp  Min: 16  Max: 22  SpO2: 99 % SpO2  Min: 98 %  Max: 100 %          Intake/Output Summary (Last 24 hours) at 3/18/2019 1132  Last data filed at 3/18/2019 1100  Gross per 24 hour   Intake 1970.07 ml   Output 376 ml   Net 1594.07 ml       Vitals:    19 1600 19 0500 19 0246   Weight: 55.2 kg (121 lb 11.1 oz) 54.9 kg (121 lb) 55.9 kg (123 lb 3.2 oz)       Physical Exam:  GEN:  no distress, lying in bed  CV:  Regular rate and rhythm, no murmur.   CHEST: Subclavian incisions sutured.  Chest tubes. Hematoma at right subclavian site slightly enlarged over the past week with no drainage or erythema  LUNGS:  normal work of breathing, Clear to auscultation bilaterally, no wheezes or crackles.   ABD:  hyperactive bowel sounds, soft, mild RLQ tenderness, nondistended  EXT:  No edema or cyanosis. Normal distal puses  NEURO: no focal deficits      Medications:    IV fluid REPLACEMENT ONLY       DOBUTamine 2.5 mcg/kg/min (19 0123)     - MEDICATION INSTRUCTIONS -       - MEDICATION INSTRUCTIONS -       BETA BLOCKER NOT PRESCRIBED         Current Facility-Administered Medications   Medication Dose Route Frequency     [START ON 3/19/2019] sodium chloride 0.9%  250 mL Intravenous Once in dialysis     [START ON 3/19/2019] sodium chloride 0.9%  300 mL Hemodialysis Machine Once     B and C vitamin Complex with folic acid  5 mL Per Feeding Tube Daily     DULoxetine  20 mg Oral Daily     fiber modular (NUTRISOURCE FIBER)  1 packet Per Feeding Tube Q6H     fluticasone  2 puff Inhalation Q12H     [START ON  3/19/2019] gelatin absorbable  1 each Topical During Hemodialysis (from stock)     insulin aspart  1-6 Units Subcutaneous Q4H     levothyroxine  100 mcg Oral Daily     lidocaine  1 patch Transdermal Q24H     lidocaine   Transdermal Q8H     lidocaine   Transdermal Q24h     melatonin  3 mg Oral At Bedtime     mesalamine  2,400 mg Oral BID     montelukast  10 mg Oral At Bedtime     mycophenolate  1,000 mg Oral or Feeding Tube BID     [START ON 3/19/2019] - MEDICATION INSTRUCTIONS -   Does not apply Once     nystatin  500,000 Units Oral 4x Daily     predniSONE  15 mg Oral BID w/meals     ranitidine  150 mg Oral Daily     rosuvastatin  10 mg Oral Daily     senna-docusate  1 tablet Oral BID    Or     senna-docusate  2 tablet Oral BID     sildenafil  20 mg Oral TID     sodium chloride (PF)  3 mL Intracatheter Q8H     [START ON 3/19/2019] sodium chloride (PF)  9 mL Intracatheter During Hemodialysis (from stock)     [START ON 3/19/2019] sodium chloride (PF)  9 mL Intracatheter During Hemodialysis (from stock)     tacrolimus  1.5 mg Oral BID IS         Labs:  CMP  Recent Labs   Lab 03/18/19  0505 03/17/19  0455  03/15/19  0409  03/14/19  1653   * 135   < > 137   < >  --    POTASSIUM 4.1 3.8   < > 3.6   < >  --    CHLORIDE 100 102   < > 105   < >  --    CO2 20 20   < > 22   < >  --    BUN 52* 30   < > 18   < >  --    CR 2.04* 1.44*   < > 1.44*   < >  --    RADAMES 8.0* 7.6*   < > 7.4*   < >  --    MAG 2.1 2.1   < >  --   --   --    PHOS 3.0 2.6   < > 2.7  --   --    * 221*   < > 136*   < >  --    LDH  --   --   --   --   --  320*   ALKPHOS 137  --   --  88  --   --    BILITOTAL 0.8  --   --  0.8  --   --    AST 36  --   --  20  --   --    ALT 57  --   --  24  --   --     < > = values in this interval not displayed.     BLOOD GASNo lab results found in last 7 days.  CBC  Recent Labs   Lab 03/18/19  0505 03/17/19  0455   WBC 13.7* 16.2*   HGB 7.7* 7.6*   HCT 24.4* 24.6*   PLT 46* 43*     COAG  Recent Labs   Lab  03/18/19  0505 03/17/19  0455  03/14/19  1653   INR 1.28* 1.25*   < > 1.36*   PTT  --   --   --  46*    < > = values in this interval not displayed.         ASSESSMENT/PLAN:  Everton Larios is a 52 yo gentleman who underwent orthotopic heart transplant 2/24/19 for non ischemic cardiomyopathy and cardiogenic shock. Post-op complicated by RV dysfunction and FRANKLIN.      Updates today  - holding heparin drip for now  - RHC and biopsy likely Tuesday      #Oliguric renal failure:  Etiology cardiorenal from venous congestion vs. nephrotoxins.  - iHD per renal  - nephrology consulted, appreciate assistance  - plan for tunneled line later this week, will need to be placed on right side given left-side goretex graft     #Sepsis  - completed antibiotics cefepime/zosyn for 7 day course (ending 3/16/19)  - cultures no growth to date     #S/p OHT on 2/24:  Graft function:  - echo 3/1 shows normal LV function, RV is mild to moderately dilated and hypokinetic  - dobutamine at 2.5 for continued RV and hemodynamic support, will eval hemodynamics at next biopsy and consider weaning down  - continue sildenafil 20 q8h     Immunosuppression:  - on MMF decreased at 1000 BID  - Prednisone 15mg BID, decreasing by 5mg daily with negative biopsies  - NOT on Simulect protocol  - tacrolimus 1.5mg BID (goal 8-10)     Prophylaxis:  - CMV +/+: medium risk, Valcyte held given thrombocytopenia/anemia, will check weekly CMV titers  - PCP: holding Bactrim for now, pentamadine given 3/3  - Thrush: improved on nystatin  - GI: on PPI  - CAV: Crestor, holding ASA given bleeding at line sites     Biopsies:  - First biopsy 3/4, 0R, no AMR  - Second biopsy 3/11, 0R, no AMR  - Third biopsy due 3/18     Goretex conduit for persistent left SVC:  - will need anticoagulation, on low intensity heparin  **NO CENTRAL VENOUS CATHETERS ON THE LEFT UPPER BODY**      #Thrombocytopenia/Anemia:   - Hgb continues to drift down after transfusions, no clear source of blood loss  but does have slowly expanding right chest wall hematoma  - discussed with Dr. House, will hold heparin drip for 24-48 hours then plan to restart  - holding valcyte    Seen and staffed with .      Trev Harris MD  Advanced Heart Failure Fellow  [phone]

## 2019-03-18 NOTE — PROGRESS NOTES
This RN assumed care from [id number]. Pt vitally stable, BPs soft. A/Ox4. Complains of pain to right upper back/shoulder, heat pack and tylenol help. Also complaining of crampy/gas pain, PRN simethicone ordered. Up to commode with assist; loose BM x1, Cdiff negative. Sternal incision ELEONORA, Right chest hematoma w/ pressure dressing. Dobutamine gtt @ 2.1mcg/kg/min and heparin gtt therapeutic at 950u/hr.

## 2019-03-18 NOTE — PLAN OF CARE
D: Heart Transplant POD 22     I/A: VSS. Patient c/o abd discomfort in lower right abdomen which has been there two days. PRN Tylenol given q 4 hours. Sinus rhythm/sinus tach. Tube feeds continue at 80 ml/hr. Off at 0800. PRN Zofran given at midnight for mild nausea and simethicone for gas pain. Large BM at 0300 with lots of gas expelled. Patient states he is feeling better. Dobutamine drip continues at 2.5 mcg/kg/min. Blood sugars monitored. Small amount of urine output--patient on dialysis. Moving well but with MATA. Heparin drip discontinued this morning per MD order. Patient has been having trouble sleeping the past few days. Contacted MD for an extra dose of melatonin at midnight. The extra dose did help and patient was able to sleep. Fiber supplement also held this shift and patient's abd discomfort is improving. Discussed with patient the need to increase his oral intake. He stated that his mothers cooking would help that. I encouraged him to have her bring in food, with low salt. He said that he would discuss this with her today.    P: NPO for Right heart cath today. Tunneled dialysis cath placement planned for 3/20. Continue to monitor.

## 2019-03-18 NOTE — PROGRESS NOTES
Brief Nephrology Note    Vitals and Labs reviewed. Remains oliguric with poor clearance. Will plan for HD Tuesday 3/19 per TTS schedule. Tunneled Dialysis Catheter planned for 3/20/2019, appreciate IR/Cards assistance in planning for outpatient Dialytic therapy.     Please call with Qs.      Merlin Herbert MD   [phone]

## 2019-03-18 NOTE — PLAN OF CARE
"/60 (BP Location: Right arm)   Pulse 90   Temp 98.4  F (36.9  C) (Oral)   Resp 16   Ht 1.727 m (5' 8\")   Wt 55.9 kg (123 lb 3.2 oz)   SpO2 99%   BMI 18.73 kg/m      A &O x4, VSS. RA. Placed on 2L NC  for comfort. MATA with activity. RHC cancelled for today, re sched for tomorrow. NPO after MN. C/O nausea and abdomen pain intermittently, gave zofran and tylenol with some relief. Dobutamine drip remains at 2.5mcg/kg/min via L picc. NJT clamped. Eating okay with fair appetite. Hold tube feedings on 3/18 evening, no tube feeds overnight. Will likely restart 3/19 evening per PA order. Needs occult stool sample next time pt has a bm. Did have 1 loose brown stool earlier today before PA placed order for stool sample. No blood noted. Hgb 7.7. Up to commode with 1 assist.    "

## 2019-03-19 ENCOUNTER — APPOINTMENT (OUTPATIENT)
Dept: CT IMAGING | Facility: CLINIC | Age: 54
DRG: 001 | End: 2019-03-19
Attending: PHYSICIAN ASSISTANT
Payer: COMMERCIAL

## 2019-03-19 ENCOUNTER — APPOINTMENT (OUTPATIENT)
Dept: OCCUPATIONAL THERAPY | Facility: CLINIC | Age: 54
DRG: 001 | End: 2019-03-19
Attending: INTERNAL MEDICINE
Payer: COMMERCIAL

## 2019-03-19 ENCOUNTER — APPOINTMENT (OUTPATIENT)
Dept: GENERAL RADIOLOGY | Facility: CLINIC | Age: 54
DRG: 001 | End: 2019-03-19
Attending: PHYSICIAN ASSISTANT
Payer: COMMERCIAL

## 2019-03-19 LAB
ANION GAP SERPL CALCULATED.3IONS-SCNC: 13 MMOL/L (ref 3–14)
BACTERIA SPEC CULT: NO GROWTH
BACTERIA SPEC CULT: NO GROWTH
BUN SERPL-MCNC: 67 MG/DL (ref 7–30)
CALCIUM SERPL-MCNC: 8.2 MG/DL (ref 8.5–10.1)
CHLORIDE SERPL-SCNC: 99 MMOL/L (ref 94–109)
CO2 SERPL-SCNC: 20 MMOL/L (ref 20–32)
CREAT SERPL-MCNC: 2.39 MG/DL (ref 0.66–1.25)
ERYTHROCYTE [DISTWIDTH] IN BLOOD BY AUTOMATED COUNT: 18.1 % (ref 10–15)
GFR SERPL CREATININE-BSD FRML MDRD: 29 ML/MIN/{1.73_M2}
GLUCOSE BLDC GLUCOMTR-MCNC: 100 MG/DL (ref 70–99)
GLUCOSE BLDC GLUCOMTR-MCNC: 108 MG/DL (ref 70–99)
GLUCOSE BLDC GLUCOMTR-MCNC: 112 MG/DL (ref 70–99)
GLUCOSE BLDC GLUCOMTR-MCNC: 119 MG/DL (ref 70–99)
GLUCOSE BLDC GLUCOMTR-MCNC: 91 MG/DL (ref 70–99)
GLUCOSE BLDC GLUCOMTR-MCNC: 99 MG/DL (ref 70–99)
GLUCOSE SERPL-MCNC: 97 MG/DL (ref 70–99)
HCT VFR BLD AUTO: 23.2 % (ref 40–53)
HGB BLD-MCNC: 7.3 G/DL (ref 13.3–17.7)
INR PPP: 1.24 (ref 0.86–1.14)
LACTATE BLD-SCNC: 0.8 MMOL/L (ref 0.7–2)
Lab: NORMAL
Lab: NORMAL
MAGNESIUM SERPL-MCNC: 2.2 MG/DL (ref 1.6–2.3)
MCH RBC QN AUTO: 30.5 PG (ref 26.5–33)
MCHC RBC AUTO-ENTMCNC: 31.5 G/DL (ref 31.5–36.5)
MCV RBC AUTO: 97 FL (ref 78–100)
PHOSPHATE SERPL-MCNC: 4.3 MG/DL (ref 2.5–4.5)
PLATELET # BLD AUTO: 43 10E9/L (ref 150–450)
POTASSIUM SERPL-SCNC: 4.8 MMOL/L (ref 3.4–5.3)
RBC # BLD AUTO: 2.39 10E12/L (ref 4.4–5.9)
SODIUM SERPL-SCNC: 132 MMOL/L (ref 133–144)
SPECIMEN SOURCE: NORMAL
SPECIMEN SOURCE: NORMAL
TACROLIMUS BLD-MCNC: 9 UG/L (ref 5–15)
TME LAST DOSE: NORMAL H
WBC # BLD AUTO: 12.1 10E9/L (ref 4–11)

## 2019-03-19 PROCEDURE — 83735 ASSAY OF MAGNESIUM: CPT | Performed by: STUDENT IN AN ORGANIZED HEALTH CARE EDUCATION/TRAINING PROGRAM

## 2019-03-19 PROCEDURE — 71250 CT THORAX DX C-: CPT

## 2019-03-19 PROCEDURE — 25000131 ZZH RX MED GY IP 250 OP 636 PS 637: Performed by: INTERNAL MEDICINE

## 2019-03-19 PROCEDURE — 88350 IMFLUOR EA ADDL 1ANTB STN PX: CPT | Performed by: INTERNAL MEDICINE

## 2019-03-19 PROCEDURE — 80197 ASSAY OF TACROLIMUS: CPT | Performed by: INTERNAL MEDICINE

## 2019-03-19 PROCEDURE — 25000132 ZZH RX MED GY IP 250 OP 250 PS 637: Performed by: INTERNAL MEDICINE

## 2019-03-19 PROCEDURE — 25000128 H RX IP 250 OP 636: Performed by: PHYSICIAN ASSISTANT

## 2019-03-19 PROCEDURE — 25000132 ZZH RX MED GY IP 250 OP 250 PS 637: Performed by: SURGERY

## 2019-03-19 PROCEDURE — 99152 MOD SED SAME PHYS/QHP 5/>YRS: CPT | Performed by: INTERNAL MEDICINE

## 2019-03-19 PROCEDURE — C1894 INTRO/SHEATH, NON-LASER: HCPCS | Performed by: INTERNAL MEDICINE

## 2019-03-19 PROCEDURE — 25000125 ZZHC RX 250: Performed by: INTERNAL MEDICINE

## 2019-03-19 PROCEDURE — 25000132 ZZH RX MED GY IP 250 OP 250 PS 637: Performed by: NURSE PRACTITIONER

## 2019-03-19 PROCEDURE — 88307 TISSUE EXAM BY PATHOLOGIST: CPT | Performed by: INTERNAL MEDICINE

## 2019-03-19 PROCEDURE — 85027 COMPLETE CBC AUTOMATED: CPT | Performed by: STUDENT IN AN ORGANIZED HEALTH CARE EDUCATION/TRAINING PROGRAM

## 2019-03-19 PROCEDURE — 25000132 ZZH RX MED GY IP 250 OP 250 PS 637: Performed by: STUDENT IN AN ORGANIZED HEALTH CARE EDUCATION/TRAINING PROGRAM

## 2019-03-19 PROCEDURE — 25000128 H RX IP 250 OP 636: Performed by: INTERNAL MEDICINE

## 2019-03-19 PROCEDURE — 25000132 ZZH RX MED GY IP 250 OP 250 PS 637: Performed by: PHYSICIAN ASSISTANT

## 2019-03-19 PROCEDURE — 74018 RADEX ABDOMEN 1 VIEW: CPT

## 2019-03-19 PROCEDURE — 27210794 ZZH OR GENERAL SUPPLY STERILE: Performed by: INTERNAL MEDICINE

## 2019-03-19 PROCEDURE — 97110 THERAPEUTIC EXERCISES: CPT | Mod: GO

## 2019-03-19 PROCEDURE — 80048 BASIC METABOLIC PNL TOTAL CA: CPT | Performed by: STUDENT IN AN ORGANIZED HEALTH CARE EDUCATION/TRAINING PROGRAM

## 2019-03-19 PROCEDURE — 21400000 ZZH R&B CCU UMMC

## 2019-03-19 PROCEDURE — 99153 MOD SED SAME PHYS/QHP EA: CPT | Performed by: INTERNAL MEDICINE

## 2019-03-19 PROCEDURE — 90937 HEMODIALYSIS REPEATED EVAL: CPT

## 2019-03-19 PROCEDURE — 93505 ENDOMYOCARDIAL BIOPSY: CPT | Performed by: INTERNAL MEDICINE

## 2019-03-19 PROCEDURE — 27210437 ZZH NUTRITION PRODUCT SEMIELEM INTERMED LITER

## 2019-03-19 PROCEDURE — 27210429 ZZH NUTRITION PRODUCT INTERMEDIATE LITER

## 2019-03-19 PROCEDURE — 00000146 ZZHCL STATISTIC GLUCOSE BY METER IP

## 2019-03-19 PROCEDURE — 84100 ASSAY OF PHOSPHORUS: CPT | Performed by: STUDENT IN AN ORGANIZED HEALTH CARE EDUCATION/TRAINING PROGRAM

## 2019-03-19 PROCEDURE — 83605 ASSAY OF LACTIC ACID: CPT | Performed by: INTERNAL MEDICINE

## 2019-03-19 PROCEDURE — 85610 PROTHROMBIN TIME: CPT | Performed by: STUDENT IN AN ORGANIZED HEALTH CARE EDUCATION/TRAINING PROGRAM

## 2019-03-19 PROCEDURE — 88346 IMFLUOR 1ST 1ANTB STAIN PX: CPT | Performed by: INTERNAL MEDICINE

## 2019-03-19 RX ORDER — LIDOCAINE 40 MG/G
CREAM TOPICAL
Status: DISCONTINUED | OUTPATIENT
Start: 2019-03-19 | End: 2019-04-03 | Stop reason: HOSPADM

## 2019-03-19 RX ORDER — HEPARIN SODIUM,PORCINE 10 UNIT/ML
5 VIAL (ML) INTRAVENOUS
Status: DISCONTINUED | OUTPATIENT
Start: 2019-03-19 | End: 2019-04-03 | Stop reason: HOSPADM

## 2019-03-19 RX ORDER — DOBUTAMINE HYDROCHLORIDE 200 MG/100ML
2.5 INJECTION INTRAVENOUS CONTINUOUS
Status: DISCONTINUED | OUTPATIENT
Start: 2019-03-19 | End: 2019-03-20

## 2019-03-19 RX ORDER — NALOXONE HYDROCHLORIDE 0.4 MG/ML
.2-.4 INJECTION, SOLUTION INTRAMUSCULAR; INTRAVENOUS; SUBCUTANEOUS
Status: ACTIVE | OUTPATIENT
Start: 2019-03-19 | End: 2019-03-20

## 2019-03-19 RX ORDER — PROCHLORPERAZINE MALEATE 5 MG
5 TABLET ORAL EVERY 6 HOURS PRN
Status: DISCONTINUED | OUTPATIENT
Start: 2019-03-19 | End: 2019-04-03 | Stop reason: HOSPADM

## 2019-03-19 RX ORDER — HEPARIN SODIUM,PORCINE 10 UNIT/ML
2-5 VIAL (ML) INTRAVENOUS
Status: COMPLETED | OUTPATIENT
Start: 2019-03-19 | End: 2019-03-30

## 2019-03-19 RX ORDER — ATROPINE SULFATE 0.1 MG/ML
0.5 INJECTION INTRAVENOUS EVERY 5 MIN PRN
Status: ACTIVE | OUTPATIENT
Start: 2019-03-19 | End: 2019-03-20

## 2019-03-19 RX ORDER — FLUMAZENIL 0.1 MG/ML
0.2 INJECTION, SOLUTION INTRAVENOUS
Status: ACTIVE | OUTPATIENT
Start: 2019-03-19 | End: 2019-03-20

## 2019-03-19 RX ORDER — HEPARIN SODIUM,PORCINE 10 UNIT/ML
5-15 VIAL (ML) INTRAVENOUS EVERY 24 HOURS
Status: DISCONTINUED | OUTPATIENT
Start: 2019-03-19 | End: 2019-04-03 | Stop reason: HOSPADM

## 2019-03-19 RX ORDER — FENTANYL CITRATE 50 UG/ML
INJECTION, SOLUTION INTRAMUSCULAR; INTRAVENOUS
Status: DISCONTINUED | OUTPATIENT
Start: 2019-03-19 | End: 2019-03-19 | Stop reason: HOSPADM

## 2019-03-19 RX ORDER — NALOXONE HYDROCHLORIDE 0.4 MG/ML
.1-.4 INJECTION, SOLUTION INTRAMUSCULAR; INTRAVENOUS; SUBCUTANEOUS
Status: DISCONTINUED | OUTPATIENT
Start: 2019-03-19 | End: 2019-04-03 | Stop reason: HOSPADM

## 2019-03-19 RX ORDER — BUMETANIDE 0.25 MG/ML
3 INJECTION INTRAMUSCULAR; INTRAVENOUS ONCE
Status: COMPLETED | OUTPATIENT
Start: 2019-03-20 | End: 2019-03-20

## 2019-03-19 RX ADMIN — SENNOSIDES AND DOCUSATE SODIUM 1 TABLET: 8.6; 5 TABLET ORAL at 07:41

## 2019-03-19 RX ADMIN — ACETAMINOPHEN 650 MG: 325 TABLET, FILM COATED ORAL at 00:21

## 2019-03-19 RX ADMIN — Medication 500000 UNITS: at 07:26

## 2019-03-19 RX ADMIN — MYCOPHENOLATE MOFETIL 1000 MG: 200 POWDER, FOR SUSPENSION ORAL at 20:43

## 2019-03-19 RX ADMIN — FLUTICASONE PROPIONATE 2 PUFF: 220 AEROSOL, METERED RESPIRATORY (INHALATION) at 07:28

## 2019-03-19 RX ADMIN — Medication 5 ML: at 07:26

## 2019-03-19 RX ADMIN — SENNOSIDES AND DOCUSATE SODIUM 1 TABLET: 8.6; 5 TABLET ORAL at 21:04

## 2019-03-19 RX ADMIN — MYCOPHENOLATE MOFETIL 1000 MG: 200 POWDER, FOR SUSPENSION ORAL at 07:26

## 2019-03-19 RX ADMIN — Medication 500000 UNITS: at 20:43

## 2019-03-19 RX ADMIN — Medication 500000 UNITS: at 16:13

## 2019-03-19 RX ADMIN — BENZOCAINE, MENTHOL 1 LOZENGE: 15; 3.6 LOZENGE ORAL at 03:51

## 2019-03-19 RX ADMIN — TACROLIMUS 2 MG: 1 CAPSULE ORAL at 20:51

## 2019-03-19 RX ADMIN — Medication 500000 UNITS: at 12:08

## 2019-03-19 RX ADMIN — ACETAMINOPHEN 650 MG: 325 TABLET, FILM COATED ORAL at 13:00

## 2019-03-19 RX ADMIN — Medication 2.5 MG: at 22:21

## 2019-03-19 RX ADMIN — SILDENAFIL 20 MG: 20 TABLET ORAL at 07:27

## 2019-03-19 RX ADMIN — MELATONIN TAB 3 MG 3 MG: 3 TAB at 22:21

## 2019-03-19 RX ADMIN — Medication 1 PACKET: at 20:43

## 2019-03-19 RX ADMIN — LEVOTHYROXINE SODIUM 100 MCG: 100 TABLET ORAL at 07:27

## 2019-03-19 RX ADMIN — SODIUM CHLORIDE 250 ML: 9 INJECTION, SOLUTION INTRAVENOUS at 08:12

## 2019-03-19 RX ADMIN — PROCHLORPERAZINE MALEATE 5 MG: 5 TABLET, FILM COATED ORAL at 12:42

## 2019-03-19 RX ADMIN — Medication 5 ML: at 22:26

## 2019-03-19 RX ADMIN — BENZOCAINE, MENTHOL 1 LOZENGE: 15; 3.6 LOZENGE ORAL at 13:04

## 2019-03-19 RX ADMIN — ACETAMINOPHEN 650 MG: 325 TABLET, FILM COATED ORAL at 20:58

## 2019-03-19 RX ADMIN — FLUTICASONE PROPIONATE 2 PUFF: 220 AEROSOL, METERED RESPIRATORY (INHALATION) at 21:04

## 2019-03-19 RX ADMIN — DULOXETINE 20 MG: 20 CAPSULE, DELAYED RELEASE ORAL at 07:27

## 2019-03-19 RX ADMIN — ACETAMINOPHEN 650 MG: 325 TABLET, FILM COATED ORAL at 03:47

## 2019-03-19 RX ADMIN — ROSUVASTATIN CALCIUM 10 MG: 10 TABLET, FILM COATED ORAL at 07:27

## 2019-03-19 RX ADMIN — RANITIDINE 150 MG: 150 TABLET ORAL at 07:27

## 2019-03-19 RX ADMIN — MESALAMINE 2400 MG: 800 TABLET, DELAYED RELEASE ORAL at 07:27

## 2019-03-19 RX ADMIN — SILDENAFIL 20 MG: 20 TABLET ORAL at 20:51

## 2019-03-19 RX ADMIN — ONDANSETRON 4 MG: 2 INJECTION INTRAMUSCULAR; INTRAVENOUS at 07:48

## 2019-03-19 RX ADMIN — SODIUM CHLORIDE 300 ML: 9 INJECTION, SOLUTION INTRAVENOUS at 08:11

## 2019-03-19 RX ADMIN — MESALAMINE 2400 MG: 800 TABLET, DELAYED RELEASE ORAL at 20:51

## 2019-03-19 RX ADMIN — SILDENAFIL 20 MG: 20 TABLET ORAL at 12:53

## 2019-03-19 RX ADMIN — Medication 1 PACKET: at 12:08

## 2019-03-19 RX ADMIN — PREDNISONE 15 MG: 10 TABLET ORAL at 07:27

## 2019-03-19 RX ADMIN — TACROLIMUS 1.5 MG: 1 CAPSULE ORAL at 07:27

## 2019-03-19 RX ADMIN — Medication 2.5 MG: at 15:23

## 2019-03-19 RX ADMIN — DOBUTAMINE HYDROCHLORIDE 2.5 MCG/KG/MIN: 200 INJECTION INTRAVENOUS at 21:21

## 2019-03-19 RX ADMIN — PREDNISONE 15 MG: 10 TABLET ORAL at 20:51

## 2019-03-19 RX ADMIN — MONTELUKAST SODIUM 10 MG: 10 TABLET, COATED ORAL at 22:21

## 2019-03-19 ASSESSMENT — PAIN DESCRIPTION - DESCRIPTORS: DESCRIPTORS: DISCOMFORT

## 2019-03-19 ASSESSMENT — ACTIVITIES OF DAILY LIVING (ADL)
ADLS_ACUITY_SCORE: 11
ADLS_ACUITY_SCORE: 13

## 2019-03-19 ASSESSMENT — MIFFLIN-ST. JEOR: SCORE: 1374.23

## 2019-03-19 NOTE — PROGRESS NOTES
Nephrology Progress Note  March 19, 2019      Today Recommendations:  Plan for HD today  TDC to be placed tmrw, appreciate IR assistance    ASSESSMENT AND RECOMMENDATIONS:   Mr. Larios is a 53 year old male with history of chronic renal insufficiency, EtOH abuse, GIB w/splenic embolization, PAF s/p ablation and cardioversion, NICM c/b cardiogenic shock requiring IABP, s/p OHT (2/24/19) on tacrolimus and MMF, complicated by RV dysfunction per echo and acute oliguric kidney injury.      # Anuric FRANKLIN on CKD stage II-III:  Patient with history of chronic renal insufficiency and cortical scarring of the L kidney per Renal US 03/2016. Baseline Crt 1.1- 1.4 over the last 6 months; however, prior BMPs have demonstrated patient Crt as high as 2.0. He most likely has a baseline level of CKD due to renal artery stenosis (longstanding smoking history, abdominal bruit on physical examination) and possible smaller FRANKLIN episodes from HFrEF exacerbations. Current rise in creatinine started 3/1 at 1.89, and increasing to 4.01 3/7 and oliguria is most likely due to nephrotoxicity associated with tacrolimus (a renal vasoconstrictor), and diuretic use.  ----  At this time, remains oliguric but UOP is improving. He continues to have poor clearance, but it is somewhat improved as well. Suspect he could have some recovery of function, but I do believe he will still req RRT for at least a few weeks. Will plan RRT today per TTS schedule at this time.     Immunosuppressants:  MMF, Tacrolimus, managed by transplant team.      #Electrolytes. Acid-base:  Acceptable.     #Hypotension-Volume status:  On low dose Dobutamine with acceptable BP. Plan to pull UF and Target 54 kg.     #Anemia  #Thrombocytopenia:  Hgb and Plt stable at this time. Heme/Onc on board. Hapto normal and smear without hemolysis. HIT neg. Abx switched from PTZ to Cefepime.    Recommendations were communicated to primary team via this note.     Seen and discussed with   Aubrey Herbert MD  [phone]    Interval History :   Nursing and provider notes from last 24 hours reviewed.  NAEON. Seen at HD. Tolerating treatment well. Denies chest pains or SOB at this time. No fevers. Taking PO but still has NGT in place. Making some urine.    MEDICATIONS:  Current Meds    albuterol  2.5 mg Nebulization BID     B and C vitamin Complex with folic acid  5 mL Per Feeding Tube Daily     DULoxetine  20 mg Oral Daily     fiber modular (NUTRISOURCE FIBER)  1 packet Per Feeding Tube Q6H     fluticasone  2 puff Inhalation Q12H     gelatin absorbable  1 each Topical During Hemodialysis (from stock)     insulin aspart  1-6 Units Subcutaneous Q4H     levothyroxine  100 mcg Oral Daily     lidocaine  1 patch Transdermal Q24H     lidocaine   Transdermal Q8H     lidocaine   Transdermal Q24h     melatonin  3 mg Oral At Bedtime     mesalamine  2,400 mg Oral BID     montelukast  10 mg Oral At Bedtime     mycophenolate  1,000 mg Oral or Feeding Tube BID     - MEDICATION INSTRUCTIONS -   Does not apply Once     nystatin  500,000 Units Oral 4x Daily     predniSONE  15 mg Oral BID w/meals     ranitidine  150 mg Oral Daily     rosuvastatin  10 mg Oral Daily     senna-docusate  1 tablet Oral BID    Or     senna-docusate  2 tablet Oral BID     sildenafil  20 mg Oral TID     sodium chloride (PF)  3 mL Intracatheter Q8H     sodium chloride (PF)  9 mL Intracatheter During Hemodialysis (from stock)     sodium chloride (PF)  9 mL Intracatheter During Hemodialysis (from stock)     tacrolimus  1.5 mg Oral QAM     tacrolimus  2 mg Oral QPM     Infusion Meds    IV fluid REPLACEMENT ONLY       DOBUTamine 2.5 mcg/kg/min (19 0766)     - MEDICATION INSTRUCTIONS -       - MEDICATION INSTRUCTIONS -       BETA BLOCKER NOT PRESCRIBED       REVIEW OF SYSTEMS:  A comprehensive of systems was not obtained due to mental status.      PHYSICAL EXAM:   Temp  Av.9  F (36.6  C)  Min: 95.9  F (35.5  C)  Max: 100  F (37.8  " C)  Arterial Line BP  Min: 68/56  Max: 182/36  Arterial Line BP 2  Min: 90/54  Max: 107/56  Arterial Line MAP (mmHg)  Av.7 mmHg  Min: 57 mmHg  Max: 123 mmHg Arterial Line Location 2: Right femoral    Pulse  Av.5  Min: 59  Max: 140 Resp  Av.1  Min: 8  Max: 45  FiO2 (%)  Av %  Min: 40 %  Max: 100 %  SpO2  Av.1 %  Min: 88 %  Max: 100 %    CVP (mmHg): 21 mmHg  /65   Pulse 84   Temp 98.1  F (36.7  C) (Oral)   Resp 18   Ht 1.727 m (5' 8\")   Wt 56.5 kg (124 lb 8 oz)   SpO2 100%   BMI 18.93 kg/m     Date 19 0700 - 19 0659   Shift 5028-1534 1523-4901 5653-9320 24 Hour Total   INTAKE   P.O. 100   100   I.V. 70.4   70.4   Shift Total(mL/kg) 170.4(2.74)   170.4(2.74)   OUTPUT   Chest Tube(mL/kg) 238(3.83)   238(3.83)   Shift Total(mL/kg) 238(3.83)   238(3.83)   Weight (kg) 62.1 62.1 62.1 62.1      Admit Weight: 63.5 kg (140 lb 1.6 oz)     GENERAL APPEARANCE: Alert  Lymphatics: no cervical LAD  HEENT: NGT in place, sclerae anicteric  Pulmonary: lungs clear to auscultation with equal breath sounds bilaterally  CV: regular rhythm, normal rate   - Edema: None  GI: soft, nontender, normal bowel sounds.  SKIN: no rash, warm  NEURO: face symmetric, AO x 3    LABS:   I personally reviewed the labs.     CMP  Recent Labs   Lab 19  0400 19  0505 19  0455 19  0416 03/15/19  0409  19  0329 19  1545   * 132* 135 138 137   < > 137 139   POTASSIUM 4.8 4.1 3.8 3.5 3.6   < > 3.8 3.9   CHLORIDE 99 100 102 108 105   < > 106 106   CO2 20 20 20 19* 22   < > 23 24   ANIONGAP 13 11 13 11 10   < > 9 8   GLC 97 200* 221* 221* 136*   < > 197* 173*   BUN 67* 52* 30 35* 18   < > 33* 25   CR 2.39* 2.04* 1.44* 1.79* 1.44*   < > 1.68* 1.19   GFRESTIMATED 29* 35* 54* 41* 54*   < > 45* 68   GFRESTBLACK 34* 41* 62 48* 62   < > 52* 79   RADAMES 8.2* 8.0* 7.6* 7.7* 7.4*   < > 7.4* 7.6*   MAG 2.2 2.1 2.1 1.8  --   --  2.2 2.4*   PHOS 4.3 3.0 2.6 1.9* 2.7  --  1.6* 2.2* "   PROTTOTAL  --  5.3*  --   --  4.4*  --  4.3* 4.5*   ALBUMIN  --  2.6*  --   --  2.3*  --  2.2* 2.2*   BILITOTAL  --  0.8  --   --  0.8  --  0.6 1.1   ALKPHOS  --  137  --   --  88  --  97 98   AST  --  36  --   --  20  --  23 28   ALT  --  57  --   --  24  --  23 24    < > = values in this interval not displayed.     CBC  Recent Labs   Lab 03/19/19  0400 03/18/19  0505 03/17/19  0455 03/16/19  0416   HGB 7.3* 7.7* 7.6* 8.0*   WBC 12.1* 13.7* 16.2* 15.7*   RBC 2.39* 2.53* 2.54* 2.73*   HCT 23.2* 24.4* 24.6* 25.8*   MCV 97 96 97 95   MCH 30.5 30.4 29.9 29.3   MCHC 31.5 31.6 30.9* 31.0*   RDW 18.1* 17.9* 17.7* 17.5*   PLT 43* 46* 43* 40*     INR  Recent Labs   Lab 03/19/19  0400 03/18/19  0505 03/17/19  0455 03/16/19  0416  03/14/19  1653   INR 1.24* 1.28* 1.25* 1.25*   < > 1.36*   PTT  --   --   --   --   --  46*    < > = values in this interval not displayed.     ABG  No lab results found in last 7 days.   URINE STUDIES  Recent Labs   Lab Test 03/17/19  0045 03/04/19  1303 02/23/19  1921 02/18/19  0957   COLOR Yellow Yellow Yellow Yellow   APPEARANCE Slightly Cloudy Clear Clear Clear   URINEGLC 30* Negative Negative Negative   URINEBILI Negative Negative Negative Negative   URINEKETONE Negative Negative Negative Negative   SG 1.016 1.014 1.008 1.008   UBLD Small* Negative Negative Negative   URINEPH 5.5 5.0 6.0 5.5   PROTEIN 100* Negative Negative Negative   NITRITE Negative Negative Negative Negative   LEUKEST Negative Negative Negative Negative   RBCU 14* 0 <1 <1   WBCU 15* 0 <1 1     No lab results found.  PTH  No lab results found.  IRON STUDIES  Recent Labs   Lab Test 11/15/18  0955 10/11/18  1235   IRON  --  63   FEB  --  457*   IRONSAT  --  14*   NATE 1,045*  --        IMAGING:  Reviewed

## 2019-03-19 NOTE — PLAN OF CARE
Pt had heart transplant 2/24/19.  Pt to have RHC on 3/19/19, NPO after Midnight. NJ tube clamped due to pt complaints of abdominal discomfort. Pt reports less abdominal discomfort at this time. Dobutamine drip continues at 2.5mcg/kg/min.

## 2019-03-19 NOTE — PLAN OF CARE
D: Heart transplant POD 24    I/A: VSS. Pain manageable. PRN Tylenol given for minor discomfort. Sinus rhythm. Abd discomfort is mostly pressure today r/t fluid build up. Due to have dialysis today. Motivated to get home. Dobutamine drip continues at 2.5 mcg/kg/min.    P: Dialysis today. NPO since midnight and no tube feeds this shift for RHC today.

## 2019-03-19 NOTE — PROGRESS NOTES
CVTS Daily Note  3/19/2019  Attending: Yves Rodrigues MD    S:   No overnight events. HD run this AM. Planning heart biopsy today.   Pt seen at bedside resting comfortably.    Does complain of abd discomfort and still coughing, otherwise no acute complaints.      Denies F/C/Sweats.  No CP, SOB, or calf pain.    Tolerating diet but is NPO today for biopsy, has been off tube feeds for >24 hrs.   + BM 2 days ago, + Flatus.    Ambulated well without assistance but is very SOB with limited activity.    Pain level tolerable. Plan as per Neuro section below.     O:   Vitals:    03/19/19 0900 03/19/19 0915 03/19/19 0930 03/19/19 0945   BP: 115/69 111/65 114/69 117/66   BP Location:       Pulse:       Resp:       Temp:       TempSrc:       SpO2:       Weight:       Height:         Vitals:    03/17/19 0500 03/18/19 0246 03/19/19 0659   Weight: 54.9 kg (121 lb) 55.9 kg (123 lb 3.2 oz) 56.5 kg (124 lb 8 oz)       Intake/Output Summary (Last 24 hours) at 3/19/2019 1001  Last data filed at 3/19/2019 0748  Gross per 24 hour   Intake 358.8 ml   Output 475 ml   Net -116.2 ml       MAPs: 78 - 92   Gen: AAO x 3, pleasant, NAD  CV: RRR, S1S2 normal, no murmurs, rubs, or gallops.   Pulm: overall CTA, no rhonchi or wheezes. RML with decreased breath sounds and some skin bruising along right lateral chest wall.   Abd: mild distension but soft, non-tender, no guarding  Ext: no peripheral edema  Incision: clean, dry, intact, no erythema  Chest Tube sites: clean and dry    Labs:  [facility]  Recent Labs   Lab 03/19/19  0400 03/18/19  0505 03/17/19  0455 03/16/19  0416   * 132* 135 138   POTASSIUM 4.8 4.1 3.8 3.5   CHLORIDE 99 100 102 108   RADAMES 8.2* 8.0* 7.6* 7.7*   CO2 20 20 20 19*   BUN 67* 52* 30 35*   CR 2.39* 2.04* 1.44* 1.79*   GLC 97 200* 221* 221*     CBC  Recent Labs   Lab 03/19/19  0400 03/18/19  0505 03/17/19  0455 03/16/19  0416   WBC 12.1* 13.7* 16.2* 15.7*   RBC 2.39* 2.53* 2.54* 2.73*   HGB 7.3* 7.7* 7.6* 8.0*   HCT 23.2*  24.4* 24.6* 25.8*   MCV 97 96 97 95   MCH 30.5 30.4 29.9 29.3   MCHC 31.5 31.6 30.9* 31.0*   RDW 18.1* 17.9* 17.7* 17.5*   PLT 43* 46* 43* 40*     INR  Recent Labs   Lab 03/19/19  0400 03/18/19  0505 03/17/19  0455 03/16/19  0416   INR 1.24* 1.28* 1.25* 1.25*      Hepatic Panel   Lab Results   Component Value Date    AST 36 03/18/2019     Lab Results   Component Value Date    ALT 57 03/18/2019     Lab Results   Component Value Date    ALBUMIN 2.6 03/18/2019     GLUCOSE:   Recent Labs   Lab 03/19/19  0730 03/19/19  0400 03/19/19  0359 03/19/19  0025 03/18/19  2052 03/18/19  1722 03/18/19  1326  03/18/19  0505  03/17/19  0455  03/16/19  0416  03/15/19  0409 03/14/19  2116   GLC  --  97  --   --   --   --   --   --  200*  --  221*  --  221*  --  136* 103*   BGM 99  --  100* 108* 110* 119* 145*   < >  --    < > 212*   < >  --    < >  --   --     < > = values in this interval not displayed.       Imaging:  reviewed recent imaging; R lateral chest wall opacity, no signs bowel obstruction       A/P:  Everton Larios is a 53 year old male with PMH etoh abuse, hypothyroidism, intermittent asthma, ulcerative colitis, Depression, Chronic HFrEF, NICM admitted with cardiogenic shock requiring subclavian balloon pump. He is now s/p OHT 2/24/19 with Piyush House and Christopher.    - First biopsy 3/4, 0R, no AMR  - Second biopsy 3/11, 0R, no AMR  - Third biopsy due 3/19     Neuro:   - Neuro intact. ICU delirium improving. Head CT 3/9 without acute pathology.   - Hx Depression: pta cymbalta  - Sleep: melatonin   - Tylenol and oxycodone     CV:   - Hx of NICM, now S/P heart transplant 2/24  - No arrhythmia, HD stable.  - ASA 81 mg, Crestor  - Dobutamine 2.5 mcg/kg/min for mild decreased RV function. Sildenafil 20 mg TID.      Pulm:   - Hx intermittent asthma, pta Singulair.   - Pulm toilet, IS, activity and deep breathing   - Supplemental O2 PRN to keep sats > 92%. Wean off as tolerated.     ID:   - WBC 12.1, afebrile, no signs or  symptoms of infection. Completed 7 days cefepime/zosyn 3/16.  - Blood cultures 3/13: NGTD. C diff negative 3/17.    - Sending sputum sample 3/19.      GI / FEN:  - Reg diet, bowel regimen. Calorie counts. Na 132.   - Tolerating tube feeds but off 3/18 for abd distension. Will try new formula 3/19 pm.    - Hx ulcerative colitis; pta mesalamine. Stool occult pending sample.      Renal / :   - Creatinine 2.39, adequate UOP  - Nephrology following for oliguria and T/Th/Sa hemodialysis. Planning tunneled HD line 3/20.     Heme / Anticoagulation:  - Hgb 7.3, Plts 43.   - Holding Hep gtt 3/18. Has R chest hematoma that may interfere with HD tunneled catheter placement. Getting Chest CT 3/19 to review, likely fluid tracking laterally around R chest wall.      Endo:   - Hypothyroid: levothyroxine 100 mcg daily      PPX:   - Protonix     Dispo:   - 6C since 3/16  - Needs HD line and schedule, needs NJ removed       Discussed with CVTS Fellow   Staff surgeons have been informed of changes through both  verbal and written communication.      Arcadio Blackburn PA-C  Cardiothoracic Surgery  Pager [phone]    10:01 AM   March 19, 2019

## 2019-03-19 NOTE — PROGRESS NOTES
CLINICAL NUTRITION SERVICES - BRIEF NOTE    Nutrition Prescription    Recommendations already ordered by Registered Dietitian (RD):  1. Switch TF to semi-elemental formula as follows: Peptamen 1.5 @ 80 mL/hr x 12 hrs (8p-8a) to provide 960 mL of formula, 1440 kcal (26 kcal/kg), 65 gm protein (1.2 gm/kg), 180 gm CHO, 54 gm fat, 0 gm fiber and 739 mL free water.   - This only provides 87% of low-end calorie needs and 78% of low-end protein needs. Additional nutritional needs to be met by oral intake     Nutrition Progress Note - f/u for progress towards previous nutrition POC (see previous 3/18 reassessment for details)    Diet: NPO (for procedure)  Nutrition Support: Nutren 1.5 @ 80 mL/hr x 9hrs (8p-5a) + 4 pkts Nutrisource fiber  Intake/Tolerance: TF held overnight d/t increased abd pain and discomfort. Pt with h/o ulcerative colitis. Ongoing issues with bloating, loose stools in past week.    Intervention  1. Enteral Nutrition - Switch TF to semi-elemental formula for better absorption as follows: Peptamen 1.5 @ 80 mL/hr x 12 hrs (8p-8a) to provide 960 mL of formula, 1440 kcal (26 kcal/kg), 65 gm protein (1.2 gm/kg), 180 gm CHO, 54 gm fat, 0 gm fiber and 739 mL free water.     Gosia Hinds, RD, LD  6C pgr: [phone]

## 2019-03-19 NOTE — PLAN OF CARE
S/P OHT on 2/24/19   VSS. 2L NC. LS diminished RLL. Has a large bruise on right mid to lower back. Chest CT ordered. Had Dialysis this morning. Waiting to go for heart biopsy. Nausea- gave zofran and compazine. Had abdomen xray. Sepsis triggered. Lactic acid 0.8

## 2019-03-19 NOTE — PROGRESS NOTES
HEMODIALYSIS TREATMENT NOTE    Date: 3/19/2019  Time: 11:10    Data:  Pre Wt:   56.5 kg  Desired Wt: 54 kg   Post Wt:    Weight gain:    Weight change:    Ultrafiltration - Post Run Net Total Removed (mL): 2500 mL  Ultrafiltration - Post Run Net Total Gain (mL): 0  Vascular Access Status: Yes, secured and visible  Dialyzer Rinse: light  Total Blood Volume Processed: 43.3 L  Total Dialysis (Treatment) Time: 3 hours     Lab: No    Assessment/Interventions:  3 hour HD run.  Lines reversed and blood flow decreased 2/2 collapsing.  Blood flow 200 mL/min. MD informed. 2.5 kg removed to 54 kg as directed per order.  Drsg changed to dialysis catheter.  Report called.       Plan:  Continue with plan of care.  Next run per Renal Team.

## 2019-03-19 NOTE — PROGRESS NOTES
CVTS:      Discussed with Surgeon (Dr House), will remove PICC line as soon as he has a midline catheter.   PICC line on left side places L SVC graft at risk of infection.   Ordered R brachial midline catheter ASAP.   Can still have tunneled HD catheter in R internal jugular.      Arcadio Blackburn PA-C  Cardiothoracic Surgery  Pager [phone]    4:18 PM   March 19, 2019

## 2019-03-20 ENCOUNTER — APPOINTMENT (OUTPATIENT)
Dept: OCCUPATIONAL THERAPY | Facility: CLINIC | Age: 54
DRG: 001 | End: 2019-03-20
Attending: INTERNAL MEDICINE
Payer: COMMERCIAL

## 2019-03-20 ENCOUNTER — APPOINTMENT (OUTPATIENT)
Dept: PHYSICAL THERAPY | Facility: CLINIC | Age: 54
DRG: 001 | End: 2019-03-20
Attending: INTERNAL MEDICINE
Payer: COMMERCIAL

## 2019-03-20 ENCOUNTER — ANESTHESIA EVENT (OUTPATIENT)
Dept: SURGERY | Facility: CLINIC | Age: 54
End: 2019-03-20

## 2019-03-20 LAB
ABO + RH BLD: NORMAL
ABO + RH BLD: NORMAL
ANION GAP SERPL CALCULATED.3IONS-SCNC: 15 MMOL/L (ref 3–14)
BLD GP AB SCN SERPL QL: NORMAL
BLD PROD TYP BPU: NORMAL
BLOOD BANK CMNT PATIENT-IMP: NORMAL
BUN SERPL-MCNC: 58 MG/DL (ref 7–30)
CALCIUM SERPL-MCNC: 7.9 MG/DL (ref 8.5–10.1)
CHLORIDE SERPL-SCNC: 98 MMOL/L (ref 94–109)
CO2 SERPL-SCNC: 20 MMOL/L (ref 20–32)
CREAT SERPL-MCNC: 2.31 MG/DL (ref 0.66–1.25)
ERYTHROCYTE [DISTWIDTH] IN BLOOD BY AUTOMATED COUNT: 19.1 % (ref 10–15)
GFR SERPL CREATININE-BSD FRML MDRD: 30 ML/MIN/{1.73_M2}
GLUCOSE BLDC GLUCOMTR-MCNC: 174 MG/DL (ref 70–99)
GLUCOSE BLDC GLUCOMTR-MCNC: 176 MG/DL (ref 70–99)
GLUCOSE BLDC GLUCOMTR-MCNC: 187 MG/DL (ref 70–99)
GLUCOSE BLDC GLUCOMTR-MCNC: 212 MG/DL (ref 70–99)
GLUCOSE BLDC GLUCOMTR-MCNC: 246 MG/DL (ref 70–99)
GLUCOSE BLDC GLUCOMTR-MCNC: 317 MG/DL (ref 70–99)
GLUCOSE SERPL-MCNC: 254 MG/DL (ref 70–99)
GRAM STN SPEC: NORMAL
HCT VFR BLD AUTO: 24.8 % (ref 40–53)
HEMOCCULT STL QL: NEGATIVE
HGB BLD-MCNC: 7.7 G/DL (ref 13.3–17.7)
INR PPP: 1.27 (ref 0.86–1.14)
Lab: NORMAL
MAGNESIUM SERPL-MCNC: 2.2 MG/DL (ref 1.6–2.3)
MCH RBC QN AUTO: 30.7 PG (ref 26.5–33)
MCHC RBC AUTO-ENTMCNC: 31 G/DL (ref 31.5–36.5)
MCV RBC AUTO: 99 FL (ref 78–100)
NUM BPU REQUESTED: 2
PHOSPHATE SERPL-MCNC: 4.9 MG/DL (ref 2.5–4.5)
PLATELET # BLD AUTO: 78 10E9/L (ref 150–450)
POTASSIUM SERPL-SCNC: 4 MMOL/L (ref 3.4–5.3)
RBC # BLD AUTO: 2.51 10E12/L (ref 4.4–5.9)
SODIUM SERPL-SCNC: 134 MMOL/L (ref 133–144)
SPECIMEN EXP DATE BLD: NORMAL
SPECIMEN SOURCE: NORMAL
WBC # BLD AUTO: 10.2 10E9/L (ref 4–11)

## 2019-03-20 PROCEDURE — 83735 ASSAY OF MAGNESIUM: CPT | Performed by: INTERNAL MEDICINE

## 2019-03-20 PROCEDURE — 40000275 ZZH STATISTIC RCP TIME EA 10 MIN

## 2019-03-20 PROCEDURE — 27210437 ZZH NUTRITION PRODUCT SEMIELEM INTERMED LITER

## 2019-03-20 PROCEDURE — 21400000 ZZH R&B CCU UMMC

## 2019-03-20 PROCEDURE — 94640 AIRWAY INHALATION TREATMENT: CPT | Mod: 76

## 2019-03-20 PROCEDURE — 25000132 ZZH RX MED GY IP 250 OP 250 PS 637: Performed by: INTERNAL MEDICINE

## 2019-03-20 PROCEDURE — 27210886 ZZH ACCESSORY CR5

## 2019-03-20 PROCEDURE — 87070 CULTURE OTHR SPECIMN AEROBIC: CPT | Performed by: INTERNAL MEDICINE

## 2019-03-20 PROCEDURE — 25000125 ZZHC RX 250: Performed by: INTERNAL MEDICINE

## 2019-03-20 PROCEDURE — 25000132 ZZH RX MED GY IP 250 OP 250 PS 637: Performed by: NURSE PRACTITIONER

## 2019-03-20 PROCEDURE — 97116 GAIT TRAINING THERAPY: CPT | Mod: GP

## 2019-03-20 PROCEDURE — 25000128 H RX IP 250 OP 636: Performed by: PHYSICIAN ASSISTANT

## 2019-03-20 PROCEDURE — 25000132 ZZH RX MED GY IP 250 OP 250 PS 637: Performed by: STUDENT IN AN ORGANIZED HEALTH CARE EDUCATION/TRAINING PROGRAM

## 2019-03-20 PROCEDURE — 00000146 ZZHCL STATISTIC GLUCOSE BY METER IP

## 2019-03-20 PROCEDURE — C1769 GUIDE WIRE: HCPCS

## 2019-03-20 PROCEDURE — 25000131 ZZH RX MED GY IP 250 OP 636 PS 637: Performed by: INTERNAL MEDICINE

## 2019-03-20 PROCEDURE — 84100 ASSAY OF PHOSPHORUS: CPT | Performed by: INTERNAL MEDICINE

## 2019-03-20 PROCEDURE — 86850 RBC ANTIBODY SCREEN: CPT | Performed by: PHYSICIAN ASSISTANT

## 2019-03-20 PROCEDURE — 82272 OCCULT BLD FECES 1-3 TESTS: CPT | Performed by: INTERNAL MEDICINE

## 2019-03-20 PROCEDURE — 25000125 ZZHC RX 250: Performed by: PHYSICIAN ASSISTANT

## 2019-03-20 PROCEDURE — 97110 THERAPEUTIC EXERCISES: CPT | Mod: GP

## 2019-03-20 PROCEDURE — 87186 SC STD MICRODIL/AGAR DIL: CPT | Performed by: INTERNAL MEDICINE

## 2019-03-20 PROCEDURE — C1750 CATH, HEMODIALYSIS,LONG-TERM: HCPCS

## 2019-03-20 PROCEDURE — 85610 PROTHROMBIN TIME: CPT | Performed by: INTERNAL MEDICINE

## 2019-03-20 PROCEDURE — 86900 BLOOD TYPING SEROLOGIC ABO: CPT | Performed by: PHYSICIAN ASSISTANT

## 2019-03-20 PROCEDURE — 80048 BASIC METABOLIC PNL TOTAL CA: CPT | Performed by: INTERNAL MEDICINE

## 2019-03-20 PROCEDURE — 97530 THERAPEUTIC ACTIVITIES: CPT | Mod: GP

## 2019-03-20 PROCEDURE — 36415 COLL VENOUS BLD VENIPUNCTURE: CPT | Performed by: PHYSICIAN ASSISTANT

## 2019-03-20 PROCEDURE — 25000132 ZZH RX MED GY IP 250 OP 250 PS 637: Performed by: SURGERY

## 2019-03-20 PROCEDURE — 87205 SMEAR GRAM STAIN: CPT | Performed by: INTERNAL MEDICINE

## 2019-03-20 PROCEDURE — 97110 THERAPEUTIC EXERCISES: CPT | Mod: GO

## 2019-03-20 PROCEDURE — 86901 BLOOD TYPING SEROLOGIC RH(D): CPT | Performed by: PHYSICIAN ASSISTANT

## 2019-03-20 PROCEDURE — 87077 CULTURE AEROBIC IDENTIFY: CPT | Performed by: INTERNAL MEDICINE

## 2019-03-20 PROCEDURE — 25000132 ZZH RX MED GY IP 250 OP 250 PS 637: Performed by: PHYSICIAN ASSISTANT

## 2019-03-20 PROCEDURE — 86923 COMPATIBILITY TEST ELECTRIC: CPT | Performed by: PHYSICIAN ASSISTANT

## 2019-03-20 PROCEDURE — 94640 AIRWAY INHALATION TREATMENT: CPT

## 2019-03-20 PROCEDURE — 85027 COMPLETE CBC AUTOMATED: CPT | Performed by: INTERNAL MEDICINE

## 2019-03-20 RX ORDER — ALBUTEROL SULFATE 0.83 MG/ML
2.5 SOLUTION RESPIRATORY (INHALATION) 2 TIMES DAILY
Status: COMPLETED | OUTPATIENT
Start: 2019-03-20 | End: 2019-03-20

## 2019-03-20 RX ORDER — ACETYLCYSTEINE 100 MG/ML
4 SOLUTION ORAL; RESPIRATORY (INHALATION) 2 TIMES DAILY
Status: COMPLETED | OUTPATIENT
Start: 2019-03-20 | End: 2019-03-20

## 2019-03-20 RX ORDER — MYCOPHENOLATE MOFETIL 500 MG/1
1000 TABLET, FILM COATED ORAL
Status: DISCONTINUED | OUTPATIENT
Start: 2019-03-21 | End: 2019-03-26

## 2019-03-20 RX ADMIN — Medication 1 PACKET: at 22:08

## 2019-03-20 RX ADMIN — Medication 1 PACKET: at 04:23

## 2019-03-20 RX ADMIN — ACETAMINOPHEN 650 MG: 325 TABLET, FILM COATED ORAL at 20:56

## 2019-03-20 RX ADMIN — LEVOTHYROXINE SODIUM 100 MCG: 100 TABLET ORAL at 08:30

## 2019-03-20 RX ADMIN — MESALAMINE 2400 MG: 800 TABLET, DELAYED RELEASE ORAL at 20:14

## 2019-03-20 RX ADMIN — TACROLIMUS 1.5 MG: 1 CAPSULE ORAL at 08:29

## 2019-03-20 RX ADMIN — Medication 500000 UNITS: at 08:30

## 2019-03-20 RX ADMIN — ALBUTEROL SULFATE 2.5 MG: 2.5 SOLUTION RESPIRATORY (INHALATION) at 09:45

## 2019-03-20 RX ADMIN — ROSUVASTATIN CALCIUM 10 MG: 10 TABLET, FILM COATED ORAL at 08:29

## 2019-03-20 RX ADMIN — MELATONIN TAB 3 MG 3 MG: 3 TAB at 20:16

## 2019-03-20 RX ADMIN — ONDANSETRON 4 MG: 2 INJECTION INTRAMUSCULAR; INTRAVENOUS at 08:14

## 2019-03-20 RX ADMIN — SILDENAFIL 20 MG: 20 TABLET ORAL at 08:30

## 2019-03-20 RX ADMIN — Medication 5 ML: at 11:56

## 2019-03-20 RX ADMIN — MONTELUKAST SODIUM 10 MG: 10 TABLET, COATED ORAL at 21:33

## 2019-03-20 RX ADMIN — RANITIDINE 150 MG: 150 TABLET ORAL at 08:29

## 2019-03-20 RX ADMIN — Medication 500000 UNITS: at 17:21

## 2019-03-20 RX ADMIN — ACETAMINOPHEN 650 MG: 325 TABLET, FILM COATED ORAL at 16:18

## 2019-03-20 RX ADMIN — Medication 1 PACKET: at 16:19

## 2019-03-20 RX ADMIN — Medication 500000 UNITS: at 20:15

## 2019-03-20 RX ADMIN — ACETYLCYSTEINE 4 ML: 100 SOLUTION ORAL; RESPIRATORY (INHALATION) at 09:45

## 2019-03-20 RX ADMIN — DULOXETINE 20 MG: 20 CAPSULE, DELAYED RELEASE ORAL at 08:30

## 2019-03-20 RX ADMIN — PREDNISONE 15 MG: 10 TABLET ORAL at 18:02

## 2019-03-20 RX ADMIN — BENZOCAINE, MENTHOL 1 LOZENGE: 15; 3.6 LOZENGE ORAL at 18:02

## 2019-03-20 RX ADMIN — FLUTICASONE PROPIONATE 2 PUFF: 220 AEROSOL, METERED RESPIRATORY (INHALATION) at 08:31

## 2019-03-20 RX ADMIN — Medication 5 ML: at 20:22

## 2019-03-20 RX ADMIN — SENNOSIDES AND DOCUSATE SODIUM 1 TABLET: 8.6; 5 TABLET ORAL at 20:15

## 2019-03-20 RX ADMIN — SILDENAFIL 20 MG: 20 TABLET ORAL at 14:35

## 2019-03-20 RX ADMIN — MESALAMINE 2400 MG: 800 TABLET, DELAYED RELEASE ORAL at 08:29

## 2019-03-20 RX ADMIN — ALBUTEROL SULFATE 2.5 MG: 2.5 SOLUTION RESPIRATORY (INHALATION) at 19:52

## 2019-03-20 RX ADMIN — Medication 2.5 MG: at 22:06

## 2019-03-20 RX ADMIN — TACROLIMUS 2 MG: 1 CAPSULE ORAL at 18:02

## 2019-03-20 RX ADMIN — BUMETANIDE 3 MG: 0.25 INJECTION INTRAMUSCULAR; INTRAVENOUS at 08:29

## 2019-03-20 RX ADMIN — Medication 1 PACKET: at 12:25

## 2019-03-20 RX ADMIN — MYCOPHENOLATE MOFETIL 1000 MG: 200 POWDER, FOR SUSPENSION ORAL at 20:15

## 2019-03-20 RX ADMIN — FLUTICASONE PROPIONATE 2 PUFF: 220 AEROSOL, METERED RESPIRATORY (INHALATION) at 20:14

## 2019-03-20 RX ADMIN — Medication 2.5 MG: at 18:16

## 2019-03-20 RX ADMIN — SILDENAFIL 20 MG: 20 TABLET ORAL at 20:16

## 2019-03-20 RX ADMIN — Medication 500000 UNITS: at 14:51

## 2019-03-20 RX ADMIN — PREDNISONE 15 MG: 10 TABLET ORAL at 08:30

## 2019-03-20 RX ADMIN — SENNOSIDES AND DOCUSATE SODIUM 1 TABLET: 8.6; 5 TABLET ORAL at 08:30

## 2019-03-20 RX ADMIN — ACETYLCYSTEINE 4 ML: 100 SOLUTION ORAL; RESPIRATORY (INHALATION) at 19:52

## 2019-03-20 RX ADMIN — MYCOPHENOLATE MOFETIL 1000 MG: 200 POWDER, FOR SUSPENSION ORAL at 08:31

## 2019-03-20 ASSESSMENT — MIFFLIN-ST. JEOR: SCORE: 1370.5

## 2019-03-20 ASSESSMENT — PAIN DESCRIPTION - DESCRIPTORS
DESCRIPTORS: DISCOMFORT
DESCRIPTORS: ACHING;STABBING

## 2019-03-20 ASSESSMENT — ACTIVITIES OF DAILY LIVING (ADL)
ADLS_ACUITY_SCORE: 13

## 2019-03-20 NOTE — PROGRESS NOTES
Brief Nephrology Note    Tolerated HD yesterday. Remains oliguric. Will plan for HD Thurs 3/21 per TTS schedule. Tunneled Dialysis Catheter planned for today.     Please call with Qs.      Merlin Herbert MD   [phone]

## 2019-03-20 NOTE — PROGRESS NOTES
Neuro: A&Ox4.   Cardiac: Afebrile, VSS, NSR.   Respiratory: 2L per nasal cannula w/humidification. MATA  GI/: Voiding spontaneously, oliguric due to HD. 1 large BM this shift, stool sample sent  Diet/appetite: Tolerating regular diet. Denies nausea  Cycled TF infusing at 80. Blood sugars q4h, 0400 elevated to 315, MD notified, sliding scale administered and will recheck in AM.   Activity: Up with SBA to commode, independently repositioning     Pain: . Denies  Skin: No new deficits noted. Groin site CDI and CMS intact   Lines: Midline infusing dobutamine gtt at 2.5mcg/kg/min.  Pt is PCU collect   Drains: NJ tube with TF, HD [facility]     Pt has been resting comfortably throughout night, will continue to monitor and follow plan of care.

## 2019-03-20 NOTE — PLAN OF CARE
Discharge Planner OT   Patient plan for discharge: Not discussed this session.  Current status:  Pt required CGA and vc's for transfer supine<->seated EOB and sit<->standing. Pt required Min A x 1 and vc's to engage in transfer sit<->standing. Pt tolerated therapy session well. VSS.   Barriers to return to prior living situation: Decreased strength/endurance/Fatigued, SOB, Fatigued.  Recommendations for discharge: ARU  Rationale for recommendations: Pt will benefit from continued therapy to address barriers above and to maximize functional independence. VSS.        Entered by: Porfirio Law 03/19/2019 11:25 PM

## 2019-03-20 NOTE — PROGRESS NOTES
CVTS:     Planning to washout right chest wall hematoma in OR tomorrow.   Pre-op orders written, patient and family questions ordered.   NPO at midnight. Stop tube feeds at midnight.       Arcadio Blackburn PA-C  Cardiothoracic Surgery  Pager [phone]    1:50 PM   March 20, 2019

## 2019-03-20 NOTE — PLAN OF CARE
D: NICM with cardiogenic shock s/p OHT on 2/24/2019. Recovery c/b mild decreased RV function per echo and acute oliguric kidney injury now on dobutamine gtt and T/Th/Sa hemodialysis.     I: Monitored vitals and assessed pt status.   Changed: dobutamine concentration changed so it could run through midline instead of PICC. Left DL PICC removed.   Running: dobutamine 500mg/250mL 2.5 mcg/kg/min into R midline  PRN: acetaminophen 650mg @ 2100, oxycodone IR 2.5 mg @ 2230        A: Routine R heart cath with biopsy this shift through R femoral vein.   Neuro: A&O x 4.   Cardiac: SR  Respiratory: MATA, infrequent cough. Chest CT today showed GGO in R lung, possibly infectious. Unable to obtain sputum sample this shift.  GI/: mild constipation. No BM this shift. Unable to obtain occult stool sample this shift. No UO this shift.   Diet/appetite: regular diet, 2 L fluid restriction, tube feedings at 80 mL/hr into nasoduodenal tube 8P-8A. Tolerating.   Activity: Ax1.   Pain: painful sites: lower back, hematoma on R chest, R femoral groin site, L forearm skin tear, L upper arm PICC removal site. Controlled adequately with PRN tylenol and oxycodone.   Skin: sternal incision, L chest incision, hematoma R chest, skin tear L forearm, R femoral cath site, blanchable redness on sacrum applied preventative Mepilex  LDAs: R DL midline (white running dobutamine, purple heparin locked), L PIV saline locked.      P: Continue to monitor Pt status and report changes to treatment team - CVTS. Planning tunneled HD line placement tomorrow

## 2019-03-20 NOTE — PROGRESS NOTES
CVTS Daily Note  3/20/2019  Attending: Yves Rodrigues MD    S:   Overnight events- glucose spike with restarting of tube feeds last evening, added correction scale q 4 hrs.    NPO most of yesterday for RHC and Bx. Ate dinner and cycled tube feeds restarted.   Removed L PICC and now has R midline catheter.     Pt seen at bedside resting comfortably.    Does complain of some coughing and SOB, otherwise no acute complaints.    Mild abd discomfort after waking, feeling better now.      Denies F/C/Sweats.  No CP, SOB, or calf pain.    Tolerating diet and tube feeds, + large BM overnight, + Flatus.    Ambulates better with assistance.    Pain level tolerable. Plan as per Neuro section below.     O:   Vitals:    03/19/19 2300 03/20/19 0000 03/20/19 0420 03/20/19 0749   BP: 118/68 125/66 131/66 127/67   BP Location:    Left leg   Pulse: 90 90     Resp:  18 18 18   Temp:  98  F (36.7  C) 98.8  F (37.1  C) 98.3  F (36.8  C)   TempSrc:  Oral Oral Oral   SpO2:  100% 100% 100%   Weight:       Height:         Vitals:    03/17/19 0500 03/18/19 0246 03/19/19 0659   Weight: 54.9 kg (121 lb) 55.9 kg (123 lb 3.2 oz) 56.5 kg (124 lb 8 oz)       Intake/Output Summary (Last 24 hours) at 3/20/2019 0759  Last data filed at 3/20/2019 0600  Gross per 24 hour   Intake 855 ml   Output 2500 ml   Net -1645 ml       MAPs: 79 - 96   Gen: AAO x 3, pleasant, NAD  CV: RRR, S1S2 normal, no murmurs, rubs, or gallops.   Pulm: CTA, no rhonchi or wheezes  Abd: less distended, soft, non-tender, no guarding  Ext: no peripheral edema  Incision: clean, dry, intact, no erythema  Chest Tube sites: dressings clean and dry      Labs:  BMP  Recent Labs   Lab 03/20/19  0450 03/19/19  0400 03/18/19  0505 03/17/19  0455    132* 132* 135   POTASSIUM 4.0 4.8 4.1 3.8   CHLORIDE 98 99 100 102   RADAMES 7.9* 8.2* 8.0* 7.6*   CO2 20 20 20 20   BUN 58* 67* 52* 30   CR 2.31* 2.39* 2.04* 1.44*   * 97 200* 221*     CBC  Recent Labs   Lab 03/20/19  2084  03/19/19  0400 03/18/19  0505 03/17/19  0455   WBC 10.2 12.1* 13.7* 16.2*   RBC 2.51* 2.39* 2.53* 2.54*   HGB 7.7* 7.3* 7.7* 7.6*   HCT 24.8* 23.2* 24.4* 24.6*   MCV 99 97 96 97   MCH 30.7 30.5 30.4 29.9   MCHC 31.0* 31.5 31.6 30.9*   RDW 19.1* 18.1* 17.9* 17.7*   PLT 78* 43* 46* 43*     INR  Recent Labs   Lab 03/20/19  0450 03/19/19  0400 03/18/19  0505 03/17/19  0455   INR 1.27* 1.24* 1.28* 1.25*      Hepatic Panel   Lab Results   Component Value Date    AST 36 03/18/2019     Lab Results   Component Value Date    ALT 57 03/18/2019     Lab Results   Component Value Date    ALBUMIN 2.6 03/18/2019     GLUCOSE:   Recent Labs   Lab 03/20/19  0450 03/20/19  0415 03/20/19  0011 03/19/19  2101 03/19/19  1559 03/19/19  1230 03/19/19  0730 03/19/19  0400  03/18/19  0505  03/17/19  0455  03/16/19  0416  03/15/19  0409   *  --   --   --   --   --   --  97  --  200*  --  221*  --  221*  --  136*   BGM  --  317* 212* 91 112* 119* 99  --    < >  --    < > 212*   < >  --    < >  --     < > = values in this interval not displayed.       Imaging:  reviewed recent imaging, confirmed R AICD site hematoma without significant lateral extravasation, LLL consolidation      A/P:  Everton Larios is a 53 year old male with PMH etoh abuse, hypothyroidism, intermittent asthma, ulcerative colitis, Depression, Chronic HFrEF, NICM admitted with cardiogenic shock requiring subclavian balloon pump. He is now s/p OHT 2/24/19 with Piyush House and Christopher.    - RHC and biopsy 3/4; 0R, no AMR  - RHC and biopsy 3/11; 0R, no AMR  - RHC and biopsy 3/19; results pending     Neuro:   - Neuro intact. ICU delirium improving. Head CT 3/9 without acute pathology.   - Hx Depression: pta cymbalta  - Sleep: melatonin   - Tylenol and oxycodone     CV:   - Hx of NICM, now S/P heart transplant 2/24  - No arrhythmia, HD stable.  - ASA 81 mg, Crestor  - Dobutamine 2.5 mcg/kg/min for mild decreased RV function. Sildenafil 20 mg TID.      Pulm:   - Hx  intermittent asthma, pta Singulair.   - Pulm toilet, IS, activity and deep breathing   - Supplemental O2 PRN to keep sats > 92%. Wean off as tolerated.  - LLL consolidation, albuterol and mucomyst Nebs today x 2, assess response.      ID:   - WBC 10.2, afebrile, no signs or symptoms of infection. Completed 7 days cefepime/zosyn 3/16.   - Blood cultures 3/13: NGTD. C diff negative 3/17.    - Sending sputum, pending sample, not very productive cough.      GI / FEN:  - Reg diet, bowel regimen. Calorie counts. Na 134.   - Tube feeds were off 3/18 for abd distension. New formula 3/19 pm. Watch glucose.  - Hx ulcerative colitis; pta mesalamine. Stool occult negative 3/20.      Renal / :   - Creatinine 2.31, adequate UOP  - Nephrology following for oliguria and T/Th/Sa hemodialysis. Planning right chest tunneled HD line 3/20.  - Bumex 3 mg IV on 3/20 AM. Bladder scans q 3-4 hours after dosing.      Heme / Anticoagulation:  - Hgb stable 7's, Plts 78.   - Will need coumadin eventually for SVC graft per Dr House. Will need to discuss with Cardiology, may prefer Apixaban due to frequent biopsies.    - Holding Hep gtt since 3/18. Has R chest hematoma shouldn't interfere with RIGHT chest HD tunneled catheter placement. Chest CT 3/19 confirms R chest hematoma at old AICD site. Plts improved 3/20.       Endo:   - Hypothyroid: levothyroxine 100 mcg daily   - Sliding corrective scale q 4 hrs 3/20, consider 5 units NPH on 3/20 with tube feeds depending on total insulin needed today.      PPX:   - Protonix     Dispo:   - 6C since 3/16  - Needs tunneled HD line, needs NJ removed      Discussed with CVTS Fellow   Staff surgeons have been informed of changes through both  verbal and written communication.      Arcadio Blackburn PA-C  Cardiothoracic Surgery  Pager [phone]    7:59 AM   March 20, 2019

## 2019-03-20 NOTE — PLAN OF CARE
Patient s/p Heart transplant. Now on HD T/Th/Sat. Tunneled catheter to be placed tomorrow, with a dialysis run tomorrow. Patient voiced frustration for being NPO most of the day yesterday. Alert and oriented, stated he slept well, states breathing is better today. Weaned to RA will continue to monitor sats. Vitals stable, SR on tele. Denies pain. Some nausea early this am given PRN zofran with relief. TF cycled at night, encouraged oral intake today during the day, calorie counts. BG q 4 hours, BG checks have been high today-improved this afternoon. Oliguric due to HD, bladder scan showed 235 ml and pt voided 150 ml. Dobutamine gtt at 2.5 mcg/kg/min. Up with SBA, loose, watery stool x1 today. Continue to monitor and notify CVTS with changes/concerns.

## 2019-03-20 NOTE — PROGRESS NOTES
Transplant Social Work Services Progress Note      Date of Initial Social Work Evaluation: 11/16/18, Again, 1/30/19. Admission assessment completed 2/19/19  Collaborated with: Patient    Data: Everton received a heart transplant Feb 24th, but spent almost the first month afterwards in ICU with complications. He has been moved up to  and seems to be doing a little better. He is on dialysis. His parents attended support group last week. Looks like Everton will be returning to the OR, however, tomorrow to have a washout of a hematoma.  Intervention: Met with Everton in his room and offered emotional support. Inquired into questions and concerns and normalized feelings of frustration over the journey he has been on. Assessed coping.  Assessment: Everton was talkative and seemed alert and oriented. Able to voice frustration, but also seemed realistic that his recovery may take an extended period of time. Will benefit from ongoing support. He also indicates that he is ready for a visitor from another transplant patient.  Education provided by SW: Ongoing availability of SW  Plan:    Discharge Plans in Progress: TBD    Barriers to d/c plan: Not medically appropriate    Follow up Plan: SW will continue to follow

## 2019-03-20 NOTE — PLAN OF CARE
PT - 6C  Discharge Planner PT   Patient plan for discharge: not stated  Current status: Bed mobility CGA with cueing required for sternal precautions. Sit <> Stand Min-Mod A of 2 pending seat height. Use of heart pillow and vc for sternal precautions being maintained. Ambulated ~75 ft x 2 with 2WW, SBA. Therapeutic exercise performed in sitting for LE strengthening.   Barriers to return to prior living situation: medical needs, sternal precautions, activity tolerance, balance, strength  Recommendations for discharge: ARU   Rationale for recommendations: Pt currently below baseline with regards to functional mobility and will require further therapy to address medical needs and above deficits.   Entered by: Areli Tripp 03/20/2019 12:15 PM

## 2019-03-20 NOTE — PROGRESS NOTES
Calorie Count    Intake recorded for: 3/19 Total Kcals: 0  Total Protein: 0g    Kcals from Hospital Food: 0    Protein: 0g    Kcals from Outside Food (average): 0  Protein: 0g    # Meals Recorded: 1 meal ordered through room service, no food intake recorded    # Supplements Recorded: 0

## 2019-03-21 ENCOUNTER — APPOINTMENT (OUTPATIENT)
Dept: GENERAL RADIOLOGY | Facility: CLINIC | Age: 54
DRG: 001 | End: 2019-03-21
Attending: PHYSICIAN ASSISTANT
Payer: COMMERCIAL

## 2019-03-21 ENCOUNTER — ANESTHESIA (OUTPATIENT)
Dept: SURGERY | Facility: CLINIC | Age: 54
End: 2019-03-21

## 2019-03-21 ENCOUNTER — APPOINTMENT (OUTPATIENT)
Dept: GENERAL RADIOLOGY | Facility: CLINIC | Age: 54
DRG: 001 | End: 2019-03-21
Attending: INTERNAL MEDICINE
Payer: COMMERCIAL

## 2019-03-21 LAB
BLD PROD TYP BPU: NORMAL
BLD PROD TYP BPU: NORMAL
BLD UNIT ID BPU: 0
BLOOD PRODUCT CODE: NORMAL
BPU ID: NORMAL
COPATH REPORT: NORMAL
ERYTHROCYTE [DISTWIDTH] IN BLOOD BY AUTOMATED COUNT: 18.6 % (ref 10–15)
GLUCOSE BLDC GLUCOMTR-MCNC: 118 MG/DL (ref 70–99)
GLUCOSE BLDC GLUCOMTR-MCNC: 122 MG/DL (ref 70–99)
GLUCOSE BLDC GLUCOMTR-MCNC: 142 MG/DL (ref 70–99)
GLUCOSE BLDC GLUCOMTR-MCNC: 164 MG/DL (ref 70–99)
GLUCOSE BLDC GLUCOMTR-MCNC: 171 MG/DL (ref 70–99)
GLUCOSE BLDC GLUCOMTR-MCNC: 178 MG/DL (ref 70–99)
GLUCOSE BLDC GLUCOMTR-MCNC: 199 MG/DL (ref 70–99)
HCT VFR BLD AUTO: 23.6 % (ref 40–53)
HGB BLD-MCNC: 7.3 G/DL (ref 13.3–17.7)
INR PPP: 1.33 (ref 0.86–1.14)
MCH RBC QN AUTO: 30.5 PG (ref 26.5–33)
MCHC RBC AUTO-ENTMCNC: 30.9 G/DL (ref 31.5–36.5)
MCV RBC AUTO: 99 FL (ref 78–100)
NUM BPU REQUESTED: 1
PLATELET # BLD AUTO: 80 10E9/L (ref 150–450)
RBC # BLD AUTO: 2.39 10E12/L (ref 4.4–5.9)
TACROLIMUS BLD-MCNC: 8 UG/L (ref 5–15)
TME LAST DOSE: NORMAL H
TRANSFUSION STATUS PATIENT QL: NORMAL
TRANSFUSION STATUS PATIENT QL: NORMAL
WBC # BLD AUTO: 10.5 10E9/L (ref 4–11)

## 2019-03-21 PROCEDURE — 25000128 H RX IP 250 OP 636: Performed by: NURSE ANESTHETIST, CERTIFIED REGISTERED

## 2019-03-21 PROCEDURE — 25000132 ZZH RX MED GY IP 250 OP 250 PS 637: Performed by: STUDENT IN AN ORGANIZED HEALTH CARE EDUCATION/TRAINING PROGRAM

## 2019-03-21 PROCEDURE — 71000014 ZZH RECOVERY PHASE 1 LEVEL 2 FIRST HR: Performed by: THORACIC SURGERY (CARDIOTHORACIC VASCULAR SURGERY)

## 2019-03-21 PROCEDURE — 0HC5XZZ EXTIRPATION OF MATTER FROM CHEST SKIN, EXTERNAL APPROACH: ICD-10-PCS | Performed by: THORACIC SURGERY (CARDIOTHORACIC VASCULAR SURGERY)

## 2019-03-21 PROCEDURE — 74018 RADEX ABDOMEN 1 VIEW: CPT

## 2019-03-21 PROCEDURE — 25000128 H RX IP 250 OP 636: Performed by: INTERNAL MEDICINE

## 2019-03-21 PROCEDURE — 27210794 ZZH OR GENERAL SUPPLY STERILE: Performed by: THORACIC SURGERY (CARDIOTHORACIC VASCULAR SURGERY)

## 2019-03-21 PROCEDURE — 25000132 ZZH RX MED GY IP 250 OP 250 PS 637: Performed by: SURGERY

## 2019-03-21 PROCEDURE — 71046 X-RAY EXAM CHEST 2 VIEWS: CPT

## 2019-03-21 PROCEDURE — 37000009 ZZH ANESTHESIA TECHNICAL FEE, EACH ADDTL 15 MIN: Performed by: THORACIC SURGERY (CARDIOTHORACIC VASCULAR SURGERY)

## 2019-03-21 PROCEDURE — 25000128 H RX IP 250 OP 636: Performed by: PHYSICIAN ASSISTANT

## 2019-03-21 PROCEDURE — 25000132 ZZH RX MED GY IP 250 OP 250 PS 637: Performed by: PHYSICIAN ASSISTANT

## 2019-03-21 PROCEDURE — 21400000 ZZH R&B CCU UMMC

## 2019-03-21 PROCEDURE — 40000170 ZZH STATISTIC PRE-PROCEDURE ASSESSMENT II: Performed by: THORACIC SURGERY (CARDIOTHORACIC VASCULAR SURGERY)

## 2019-03-21 PROCEDURE — 36592 COLLECT BLOOD FROM PICC: CPT | Performed by: INTERNAL MEDICINE

## 2019-03-21 PROCEDURE — 37000008 ZZH ANESTHESIA TECHNICAL FEE, 1ST 30 MIN: Performed by: THORACIC SURGERY (CARDIOTHORACIC VASCULAR SURGERY)

## 2019-03-21 PROCEDURE — 25000132 ZZH RX MED GY IP 250 OP 250 PS 637: Performed by: INTERNAL MEDICINE

## 2019-03-21 PROCEDURE — 85610 PROTHROMBIN TIME: CPT | Performed by: RADIOLOGY

## 2019-03-21 PROCEDURE — 94799 UNLISTED PULMONARY SVC/PX: CPT

## 2019-03-21 PROCEDURE — 40000275 ZZH STATISTIC RCP TIME EA 10 MIN

## 2019-03-21 PROCEDURE — 25000125 ZZHC RX 250: Performed by: NURSE ANESTHETIST, CERTIFIED REGISTERED

## 2019-03-21 PROCEDURE — 85027 COMPLETE CBC AUTOMATED: CPT | Performed by: RADIOLOGY

## 2019-03-21 PROCEDURE — 25800030 ZZH RX IP 258 OP 636: Performed by: NURSE ANESTHETIST, CERTIFIED REGISTERED

## 2019-03-21 PROCEDURE — 00000146 ZZHCL STATISTIC GLUCOSE BY METER IP

## 2019-03-21 PROCEDURE — 90937 HEMODIALYSIS REPEATED EVAL: CPT

## 2019-03-21 PROCEDURE — 36000059 ZZH SURGERY LEVEL 3 EA 15 ADDTL MIN UMMC: Performed by: THORACIC SURGERY (CARDIOTHORACIC VASCULAR SURGERY)

## 2019-03-21 PROCEDURE — 25000131 ZZH RX MED GY IP 250 OP 636 PS 637: Performed by: INTERNAL MEDICINE

## 2019-03-21 PROCEDURE — 25000565 ZZH ISOFLURANE, EA 15 MIN: Performed by: THORACIC SURGERY (CARDIOTHORACIC VASCULAR SURGERY)

## 2019-03-21 PROCEDURE — 36000057 ZZH SURGERY LEVEL 3 1ST 30 MIN - UMMC: Performed by: THORACIC SURGERY (CARDIOTHORACIC VASCULAR SURGERY)

## 2019-03-21 PROCEDURE — 80197 ASSAY OF TACROLIMUS: CPT | Performed by: INTERNAL MEDICINE

## 2019-03-21 PROCEDURE — 27210437 ZZH NUTRITION PRODUCT SEMIELEM INTERMED LITER

## 2019-03-21 PROCEDURE — 99233 SBSQ HOSP IP/OBS HIGH 50: CPT | Mod: GC | Performed by: INTERNAL MEDICINE

## 2019-03-21 PROCEDURE — 25000132 ZZH RX MED GY IP 250 OP 250 PS 637: Performed by: NURSE PRACTITIONER

## 2019-03-21 PROCEDURE — P9037 PLATE PHERES LEUKOREDU IRRAD: HCPCS | Performed by: THORACIC SURGERY (CARDIOTHORACIC VASCULAR SURGERY)

## 2019-03-21 PROCEDURE — 25000125 ZZHC RX 250: Performed by: INTERNAL MEDICINE

## 2019-03-21 RX ORDER — SODIUM CHLORIDE, SODIUM LACTATE, POTASSIUM CHLORIDE, CALCIUM CHLORIDE 600; 310; 30; 20 MG/100ML; MG/100ML; MG/100ML; MG/100ML
INJECTION, SOLUTION INTRAVENOUS CONTINUOUS PRN
Status: DISCONTINUED | OUTPATIENT
Start: 2019-03-21 | End: 2019-03-21

## 2019-03-21 RX ORDER — CEFAZOLIN SODIUM 1 G/3ML
1 INJECTION, POWDER, FOR SOLUTION INTRAMUSCULAR; INTRAVENOUS SEE ADMIN INSTRUCTIONS
Status: DISCONTINUED | OUTPATIENT
Start: 2019-03-21 | End: 2019-03-21 | Stop reason: HOSPADM

## 2019-03-21 RX ORDER — SULFAMETHOXAZOLE AND TRIMETHOPRIM 400; 80 MG/1; MG/1
1 TABLET ORAL
Status: DISCONTINUED | OUTPATIENT
Start: 2019-03-21 | End: 2019-03-23

## 2019-03-21 RX ORDER — PREDNISONE 10 MG/1
10 TABLET ORAL EVERY EVENING
Status: DISCONTINUED | OUTPATIENT
Start: 2019-03-21 | End: 2019-04-03 | Stop reason: HOSPADM

## 2019-03-21 RX ORDER — CEFAZOLIN SODIUM 2 G/100ML
2 INJECTION, SOLUTION INTRAVENOUS
Status: DISCONTINUED | OUTPATIENT
Start: 2019-03-21 | End: 2019-03-21 | Stop reason: HOSPADM

## 2019-03-21 RX ORDER — TRAMADOL HYDROCHLORIDE 50 MG/1
50-100 TABLET ORAL EVERY 6 HOURS PRN
Status: DISCONTINUED | OUTPATIENT
Start: 2019-03-21 | End: 2019-03-28

## 2019-03-21 RX ORDER — FENTANYL CITRATE 50 UG/ML
INJECTION, SOLUTION INTRAMUSCULAR; INTRAVENOUS PRN
Status: DISCONTINUED | OUTPATIENT
Start: 2019-03-21 | End: 2019-03-21

## 2019-03-21 RX ORDER — LIDOCAINE HYDROCHLORIDE 20 MG/ML
INJECTION, SOLUTION INFILTRATION; PERINEURAL PRN
Status: DISCONTINUED | OUTPATIENT
Start: 2019-03-21 | End: 2019-03-21

## 2019-03-21 RX ORDER — PIPERACILLIN SODIUM, TAZOBACTAM SODIUM 2; .25 G/10ML; G/10ML
2.25 INJECTION, POWDER, LYOPHILIZED, FOR SOLUTION INTRAVENOUS EVERY 6 HOURS
Status: DISCONTINUED | OUTPATIENT
Start: 2019-03-21 | End: 2019-03-23

## 2019-03-21 RX ORDER — DEXTROSE MONOHYDRATE 25 G/50ML
25-50 INJECTION, SOLUTION INTRAVENOUS
Status: DISCONTINUED | OUTPATIENT
Start: 2019-03-21 | End: 2019-03-21 | Stop reason: HOSPADM

## 2019-03-21 RX ORDER — METHOCARBAMOL 500 MG/1
500 TABLET, FILM COATED ORAL 3 TIMES DAILY PRN
Status: DISCONTINUED | OUTPATIENT
Start: 2019-03-21 | End: 2019-04-03 | Stop reason: HOSPADM

## 2019-03-21 RX ORDER — CEFAZOLIN SODIUM 2 G/100ML
2 INJECTION, SOLUTION INTRAVENOUS
Status: COMPLETED | OUTPATIENT
Start: 2019-03-21 | End: 2019-03-21

## 2019-03-21 RX ORDER — NICOTINE POLACRILEX 4 MG
15-30 LOZENGE BUCCAL
Status: DISCONTINUED | OUTPATIENT
Start: 2019-03-21 | End: 2019-03-21 | Stop reason: HOSPADM

## 2019-03-21 RX ORDER — PROPOFOL 10 MG/ML
INJECTION, EMULSION INTRAVENOUS PRN
Status: DISCONTINUED | OUTPATIENT
Start: 2019-03-21 | End: 2019-03-21

## 2019-03-21 RX ORDER — BUMETANIDE 0.25 MG/ML
4 INJECTION INTRAMUSCULAR; INTRAVENOUS ONCE
Status: COMPLETED | OUTPATIENT
Start: 2019-03-21 | End: 2019-03-21

## 2019-03-21 RX ORDER — CEFAZOLIN SODIUM 1 G/3ML
1 INJECTION, POWDER, FOR SOLUTION INTRAMUSCULAR; INTRAVENOUS EVERY 12 HOURS
Status: DISCONTINUED | OUTPATIENT
Start: 2019-03-21 | End: 2019-03-21

## 2019-03-21 RX ORDER — ONDANSETRON 2 MG/ML
INJECTION INTRAMUSCULAR; INTRAVENOUS PRN
Status: DISCONTINUED | OUTPATIENT
Start: 2019-03-21 | End: 2019-03-21

## 2019-03-21 RX ADMIN — LIDOCAINE HYDROCHLORIDE 80 MG: 20 INJECTION, SOLUTION INFILTRATION; PERINEURAL at 07:49

## 2019-03-21 RX ADMIN — FENTANYL CITRATE 50 MCG: 50 INJECTION, SOLUTION INTRAMUSCULAR; INTRAVENOUS at 09:24

## 2019-03-21 RX ADMIN — TACROLIMUS 2 MG: 1 CAPSULE ORAL at 17:48

## 2019-03-21 RX ADMIN — ACETAMINOPHEN 650 MG: 325 TABLET, FILM COATED ORAL at 21:25

## 2019-03-21 RX ADMIN — SILDENAFIL 20 MG: 20 TABLET ORAL at 17:48

## 2019-03-21 RX ADMIN — SODIUM CHLORIDE 250 ML: 9 INJECTION, SOLUTION INTRAVENOUS at 16:07

## 2019-03-21 RX ADMIN — PHENYLEPHRINE HYDROCHLORIDE 100 MCG: 10 INJECTION, SOLUTION INTRAMUSCULAR; INTRAVENOUS; SUBCUTANEOUS at 07:53

## 2019-03-21 RX ADMIN — ROSUVASTATIN CALCIUM 10 MG: 10 TABLET, FILM COATED ORAL at 11:19

## 2019-03-21 RX ADMIN — ONDANSETRON 4 MG: 2 INJECTION INTRAMUSCULAR; INTRAVENOUS at 08:29

## 2019-03-21 RX ADMIN — SENNOSIDES AND DOCUSATE SODIUM 1 TABLET: 8.6; 5 TABLET ORAL at 20:02

## 2019-03-21 RX ADMIN — PREDNISONE 10 MG: 10 TABLET ORAL at 20:02

## 2019-03-21 RX ADMIN — SILDENAFIL 20 MG: 20 TABLET ORAL at 11:20

## 2019-03-21 RX ADMIN — TRAMADOL HYDROCHLORIDE 50 MG: 50 TABLET, COATED ORAL at 20:09

## 2019-03-21 RX ADMIN — SODIUM CHLORIDE, POTASSIUM CHLORIDE, SODIUM LACTATE AND CALCIUM CHLORIDE: 600; 310; 30; 20 INJECTION, SOLUTION INTRAVENOUS at 07:39

## 2019-03-21 RX ADMIN — METHOCARBAMOL 500 MG: 500 TABLET, FILM COATED ORAL at 22:13

## 2019-03-21 RX ADMIN — Medication 500000 UNITS: at 17:50

## 2019-03-21 RX ADMIN — MYCOPHENOLATE MOFETIL 500 MG: 500 TABLET, FILM COATED ORAL at 11:21

## 2019-03-21 RX ADMIN — PHENYLEPHRINE HYDROCHLORIDE 100 MCG: 10 INJECTION, SOLUTION INTRAMUSCULAR; INTRAVENOUS; SUBCUTANEOUS at 07:55

## 2019-03-21 RX ADMIN — Medication 10 ML: at 18:15

## 2019-03-21 RX ADMIN — Medication 1 PACKET: at 11:23

## 2019-03-21 RX ADMIN — ACETAMINOPHEN 650 MG: 325 TABLET, FILM COATED ORAL at 11:16

## 2019-03-21 RX ADMIN — PHENYLEPHRINE HYDROCHLORIDE 100 MCG: 10 INJECTION, SOLUTION INTRAMUSCULAR; INTRAVENOUS; SUBCUTANEOUS at 07:51

## 2019-03-21 RX ADMIN — ROCURONIUM BROMIDE 50 MG: 10 INJECTION INTRAVENOUS at 07:49

## 2019-03-21 RX ADMIN — MESALAMINE 2400 MG: 800 TABLET, DELAYED RELEASE ORAL at 20:02

## 2019-03-21 RX ADMIN — PREDNISONE 15 MG: 10 TABLET ORAL at 11:17

## 2019-03-21 RX ADMIN — ONDANSETRON 4 MG: 2 INJECTION INTRAMUSCULAR; INTRAVENOUS at 20:33

## 2019-03-21 RX ADMIN — Medication: at 16:07

## 2019-03-21 RX ADMIN — Medication 500000 UNITS: at 11:22

## 2019-03-21 RX ADMIN — NOREPINEPHRINE BITARTRATE 6.4 MCG: 1 INJECTION INTRAVENOUS at 08:00

## 2019-03-21 RX ADMIN — FLUTICASONE PROPIONATE 2 PUFF: 220 AEROSOL, METERED RESPIRATORY (INHALATION) at 11:22

## 2019-03-21 RX ADMIN — SODIUM CHLORIDE 300 ML: 9 INJECTION, SOLUTION INTRAVENOUS at 16:06

## 2019-03-21 RX ADMIN — TACROLIMUS 1.5 MG: 1 CAPSULE ORAL at 11:15

## 2019-03-21 RX ADMIN — PHENYLEPHRINE HYDROCHLORIDE 100 MCG: 10 INJECTION, SOLUTION INTRAMUSCULAR; INTRAVENOUS; SUBCUTANEOUS at 09:20

## 2019-03-21 RX ADMIN — CEFAZOLIN SODIUM 2 G: 2 INJECTION, SOLUTION INTRAVENOUS at 08:10

## 2019-03-21 RX ADMIN — BENZOCAINE, MENTHOL 1 LOZENGE: 15; 3.6 LOZENGE ORAL at 17:47

## 2019-03-21 RX ADMIN — CEFAZOLIN 1 G: 1 INJECTION, POWDER, FOR SOLUTION INTRAMUSCULAR; INTRAVENOUS at 11:15

## 2019-03-21 RX ADMIN — LEVOTHYROXINE SODIUM 100 MCG: 100 TABLET ORAL at 11:20

## 2019-03-21 RX ADMIN — ALUMINUM HYDROXIDE, MAGNESIUM HYDROXIDE, AND DIMETHICONE 30 ML: 400; 400; 40 SUSPENSION ORAL at 18:44

## 2019-03-21 RX ADMIN — Medication 1 PACKET: at 17:55

## 2019-03-21 RX ADMIN — PIPERACILLIN AND TAZOBACTAM 2.25 G: 2; .25 INJECTION, POWDER, FOR SOLUTION INTRAVENOUS at 18:55

## 2019-03-21 RX ADMIN — FLUTICASONE PROPIONATE 2 PUFF: 220 AEROSOL, METERED RESPIRATORY (INHALATION) at 20:02

## 2019-03-21 RX ADMIN — SILDENAFIL 20 MG: 20 TABLET ORAL at 22:13

## 2019-03-21 RX ADMIN — DULOXETINE 20 MG: 20 CAPSULE, DELAYED RELEASE ORAL at 11:20

## 2019-03-21 RX ADMIN — MELATONIN TAB 3 MG 3 MG: 3 TAB at 22:13

## 2019-03-21 RX ADMIN — FENTANYL CITRATE 50 MCG: 50 INJECTION, SOLUTION INTRAMUSCULAR; INTRAVENOUS at 08:23

## 2019-03-21 RX ADMIN — MYCOPHENOLATE MOFETIL 1000 MG: 500 TABLET, FILM COATED ORAL at 17:48

## 2019-03-21 RX ADMIN — SULFAMETHOXAZOLE AND TRIMETHOPRIM 1 TABLET: 400; 80 TABLET ORAL at 18:07

## 2019-03-21 RX ADMIN — PROPOFOL 150 MG: 10 INJECTION, EMULSION INTRAVENOUS at 07:49

## 2019-03-21 RX ADMIN — Medication 500000 UNITS: at 20:02

## 2019-03-21 RX ADMIN — Medication 2.5 MG: at 21:25

## 2019-03-21 RX ADMIN — BENZOCAINE, MENTHOL 1 LOZENGE: 15; 3.6 LOZENGE ORAL at 12:22

## 2019-03-21 RX ADMIN — MONTELUKAST SODIUM 10 MG: 10 TABLET, COATED ORAL at 22:13

## 2019-03-21 RX ADMIN — RANITIDINE 150 MG: 150 TABLET ORAL at 11:20

## 2019-03-21 RX ADMIN — BUMETANIDE 4 MG: 0.25 INJECTION INTRAMUSCULAR; INTRAVENOUS at 18:33

## 2019-03-21 RX ADMIN — Medication 2.5 MG: at 03:59

## 2019-03-21 RX ADMIN — FENTANYL CITRATE 50 MCG: 50 INJECTION, SOLUTION INTRAMUSCULAR; INTRAVENOUS at 07:49

## 2019-03-21 RX ADMIN — MESALAMINE 2400 MG: 800 TABLET, DELAYED RELEASE ORAL at 11:21

## 2019-03-21 RX ADMIN — DESMOPRESSIN ACETATE 16 MCG: 4 SOLUTION INTRAVENOUS at 08:51

## 2019-03-21 RX ADMIN — SUGAMMADEX 200 MG: 100 INJECTION, SOLUTION INTRAVENOUS at 08:54

## 2019-03-21 ASSESSMENT — MIFFLIN-ST. JEOR
SCORE: 1374.5
SCORE: 1374.68

## 2019-03-21 ASSESSMENT — ACTIVITIES OF DAILY LIVING (ADL)
ADLS_ACUITY_SCORE: 13
ADLS_ACUITY_SCORE: 14
ADLS_ACUITY_SCORE: 14
ADLS_ACUITY_SCORE: 13

## 2019-03-21 ASSESSMENT — PAIN DESCRIPTION - DESCRIPTORS
DESCRIPTORS: SORE
DESCRIPTORS: SHARP;STABBING
DESCRIPTORS: SORE
DESCRIPTORS: ACHING;CRAMPING

## 2019-03-21 NOTE — PLAN OF CARE
Discharge Planner OT   Patient plan for discharge: Not discussed this session.   Current status: Pt supine inclined in bed upon arrival. Pt required CGA and vc's for transfer supine<->seated EOB. Pt completed transfer sit<->standing with Min->Mod A and Max vc's with increased difficulty from low surface. Pt ambulated ~75ft x 2 and 115ft with CGA, vc's and use of FWW. Pt on 3L O2 NC. VSS.   Barriers to return to prior living situation: Decreased independence with functional transfers/ADLs. Decreased strength and endurance, Fatigue, SOB.   Recommendations for discharge: ARC  Rationale for recommendations: Pt will benefit from continued therapy to maximize functional independence and strength/endurance.        Entered by: Porfirio Law 03/20/2019 10:48 PM

## 2019-03-21 NOTE — PLAN OF CARE
Patient s/p evacuation of chest well hematoma, TIFFANY drain placed ~30 ml for shift of bloody drainage. Dressing CDI. Denies pain. Vitals stable, SR on tele. Continuous Pulse ox and CO2 monitoring due to sedation during procedure. Numbers stable. NJ clamped-cycled TF at night. BG q 4 hours, stable today held sliding scale insulin due to on the border of coverage and patient was currently NPO. Recheck at 3PM. All morning medications given late morning when patient returned from procedure. Regular diet, calorie counts, boost breeze supplements. Dialysis run this afternoon. Antibiotic given per order. Up with assist of 1. Mepilex intact on coccyx. Continue to monitor and notify CVTS with changes/concerns.

## 2019-03-21 NOTE — ANESTHESIA POSTPROCEDURE EVALUATION
Anesthesia POST Procedure Evaluation    Patient: Everton Larios   MRN:     [id number] Gender:   male   Age:    53 year old :      1965        Preoperative Diagnosis: Heart Failure   Procedure(s):  Incision And Drainage; Evacuation Right Chest Wall Hematoma   Postop Comments: No value filed.       Anesthesia Type:  No value filed.    Reportable Event: NO     PAIN: Uncomplicated   Sign Out status: Comfortable, Well controlled pain     PONV: No PONV   Sign Out status:  No Nausea or Vomiting     Neuro/Psych: Uneventful perioperative course   Sign Out Status: Preoperative baseline; Age appropriate mentation     Airway/Resp.: Uneventful perioperative course   Sign Out Status: Non labored breathing, age appropriate RR; Resp. Status within EXPECTED Parameters     CV: Uneventful perioperative course   Sign Out status: Appropriate BP and perfusion indices; Appropriate HR/Rhythm     Disposition:   Sign Out in:  PACU  Disposition:  Floor  Recovery Course: Uneventful  Follow-Up: Not required           Last Anesthesia Record Vitals:  CRNA VITALS  3/21/2019 0833 - 3/21/2019 0933      3/21/2019             NIBP:  120/76    Ht Rate:  82          Last PACU/Preop Vitals:  Vitals:    19 1223 19 1300 19 1400   BP:  115/79 103/72   Pulse:      Resp:      Temp:      SpO2: 100% 99% 98%         Electronically Signed By: Paulette Christian MD, 2019, 3:02 PM

## 2019-03-21 NOTE — OR NURSING
Received 206 ml of Platelets in PACU. Also completed the DDAVP which was started by Anesthesia. Pt traveled with mask, and wore mask in PACU. Has multiple bruises all over with fragile skin. L arm skin tear dressing was changed, and bleeding reoccured when old dressing was removed. New dressing applied with vaseline around the wound to prevent sticking. Coccyx and sacrum intact with Mepilex intact.

## 2019-03-21 NOTE — PROGRESS NOTES
CVTS Daily Note  3/21/2019  Attending: Yves Rodrigues MD    S:   Or this AM for right chest wall hematoma. Drain in place.     Pt seen at bedside resting comfortably after procedure.   Does complain of some SOB with activity and decreased exercise tolerance.    Pain controlled on pain regimen. Doesn't;t like to use oxy due to constipation. Last BM yesterday. C/o some abdominal muscle discomfort.     Denies F/C/Sweats.  No CP or calf pain.    Tolerating diet and tube feeds, tolerating po as well.     Ambulates better with assistance.    Pain level tolerable. Plan as per Neuro section below.     O:   Vitals:    03/21/19 1200 03/21/19 1223 03/21/19 1300 03/21/19 1400   BP: 118/79  115/79 103/72   BP Location:       Pulse:       Resp:       Temp:       TempSrc:       SpO2: 100% 100% 99% 98%   Weight:       Height:         Vitals:    03/19/19 0659 03/20/19 0900 03/21/19 0500   Weight: 56.5 kg (124 lb 8 oz) 56.1 kg (123 lb 10.9 oz) 56.5 kg (124 lb 9.6 oz)       Intake/Output Summary (Last 24 hours) at 3/20/2019 0759  Last data filed at 3/20/2019 0600  Gross per 24 hour   Intake 855 ml   Output 2500 ml   Net -1645 ml         Gen: AAO x 3, pleasant, NAD  CV: RRR, S1S2 normal, no murmurs, rubs, or gallops.   Pulm: CTA, no rhonchi or wheezes  Abd: none distended, soft, non-tender, no guarding.  Ext: no peripheral edema  Incision: clean, dry, intact, no erythema  Chest Tube sites: dressings clean and dry      Labs:  BMP  Recent Labs   Lab 03/20/19  0450 03/19/19  0400 03/18/19  0505 03/17/19  0455    132* 132* 135   POTASSIUM 4.0 4.8 4.1 3.8   CHLORIDE 98 99 100 102   RADAMES 7.9* 8.2* 8.0* 7.6*   CO2 20 20 20 20   BUN 58* 67* 52* 30   CR 2.31* 2.39* 2.04* 1.44*   * 97 200* 221*     CBC  Recent Labs   Lab 03/21/19  0710 03/20/19  0450 03/19/19  0400 03/18/19  0505   WBC 10.5 10.2 12.1* 13.7*   RBC 2.39* 2.51* 2.39* 2.53*   HGB 7.3* 7.7* 7.3* 7.7*   HCT 23.6* 24.8* 23.2* 24.4*   MCV 99 99 97 96   MCH 30.5 30.7  30.5 30.4   MCHC 30.9* 31.0* 31.5 31.6   RDW 18.6* 19.1* 18.1* 17.9*   PLT 80* 78* 43* 46*     INR  Recent Labs   Lab 03/21/19  0710 03/20/19  0450 03/19/19  0400 03/18/19  0505   INR 1.33* 1.27* 1.24* 1.28*      Hepatic Panel   Lab Results   Component Value Date    AST 36 03/18/2019     Lab Results   Component Value Date    ALT 57 03/18/2019     Lab Results   Component Value Date    ALBUMIN 2.6 03/18/2019     GLUCOSE:   Recent Labs   Lab 03/21/19  1112 03/21/19  0629 03/21/19  0349 03/21/19  0014 03/20/19 2006 03/20/19  1602  03/20/19  0450  03/19/19  0400  03/18/19  0505  03/17/19  0455  03/16/19  0416  03/15/19  0409   GLC  --   --   --   --   --   --   --  254*  --  97  --  200*  --  221*  --  221*  --  136*   * 118* 122* 171* 174* 187*   < >  --    < >  --    < >  --    < > 212*   < >  --    < >  --     < > = values in this interval not displayed.     Specimen Description 03/20/2019  4:30    Sputum    Culture Micro 03/20/2019  4:30    Heavy growth   Normal nima    Culture Micro (Abnormal) 03/20/2019  4:30    Abnormal   Light growth   Lactose fermenting gram negative rods        Imaging:  reviewed recent imaging, confirmed R AICD site hematoma without significant lateral extravasation, LLL consolidation      A/P:  Everton Larios is a 53 year old male with PMH etoh abuse, hypothyroidism, intermittent asthma, ulcerative colitis, Depression, Chronic HFrEF, NICM admitted with cardiogenic shock requiring subclavian balloon pump. He is now s/p OHT 2/24/19 with Piyush House and Christopher.    - RHC and biopsy 3/4; 0R, no AMR  - RHC and biopsy 3/11; 0R, no AMR  - RHC and biopsy 3/19; results pending     Neuro:   - Neuro intact. ICU delirium improving. Head CT 3/9 without acute pathology.   - Hx Depression: pta cymbalta  - Sleep: melatonin   - Tylenol and oxycodone  - Start tramadol      CV:   - Hx of NICM, now S/P heart transplant 2/24  - No arrhythmia, HD stable.  - ASA 81 mg,  Crestor  - Dobutamine 2.5 mcg/kg/min for mild decreased RV function. Sildenafil 20 mg TID.      Pulm:   - Hx intermittent asthma, pta Singulair.   - Pulm toilet, IS, activity and deep breathing   - Supplemental O2 PRN to keep sats > 92%. Wean off as tolerated.  - LLL consolidation, albuterol and mucomyst Nebs today x 2, assess response.   - Sputum culture showing lactose fermenting gram neg rods-will start levofloxacin.   - CXR today       ID:   - WBC 10.5, afebrile, no signs or symptoms of infection. Completed 7 days cefepime/zosyn 3/16.   - Blood cultures 3/13: NGTD. C diff negative 3/17.    - Sputum culture positive above, start levofloxacin and await sensitivities.      GI / FEN:  - Reg diet, bowel regimen. Calorie counts. Na 134.   - Tube feeds were off 3/18 for abd distension. New formula 3/19 pm. Watch glucose.  - Hx ulcerative colitis; pta mesalamine. Stool occult negative 3/20.      Renal / :   - Creatinine 2.31, adequate UOP  - Nephrology following for oliguria and T/Th/Sa hemodialysis. Planning right chest tunneled HD line 3/20.  - Bumex 3 mg IV on 3/20 AM. Bladder scans q 3-4 hours after dosing. Will re dose tomorrow.       Heme / Anticoagulation:  - Hgb stable 7's, Plts 78.   - Will need coumadin eventually for SVC graft per Dr House. Will need to discuss with Cardiology, may prefer Apixaban due to frequent biopsies.    - No biopsies or central line access on left due to SVC graft.   - Holding Hep gtt since 3/18. Has R chest hematoma shouldn't interfere with RIGHT chest HD tunneled catheter placement. Chest CT 3/19 confirms R chest hematoma at old AICD site, s/p drainage in OR 3/21.   Endo:   - Hypothyroid: levothyroxine 100 mcg daily   - Sliding corrective scale q 4 hrs 3/20, consider 5 units NPH with tube feeds depending on total insulin needed today.      PPX:   - Protonix     Dispo:   - 6C since 3/16  - Needs tunneled HD line when lena drain removed from old pacer pocket, needs NJ  removed      Discussed with CVTS Fellow   Staff surgeons have been informed of changes through both  verbal and written communication.      Sheila Ann PA-C  Cardiothoracic Surgery  Pager [phone]  March 21, 2019

## 2019-03-21 NOTE — PLAN OF CARE
"Patient s/p heart transplant on 2/24/2019. NPO since midnight except for meds for right chest wall hematoma washout in OR today. Tube feeding stopped at midnight. Vital signs stable. Patient got 2.5 mg oxycodone at 0400 for abdominal pain rated \"mild\" by patient. Glucose 171 at midnight and 122 at 0400. Plan to do AM scrub about 0500 and prepare patient for pre-op  before 0600 with OR time for 0730.     Addendum at 0525: preop scrub and shampoo done, patient voided, awaiting lab draw. Plan  for transport at 0600.   "

## 2019-03-21 NOTE — PROGRESS NOTES
HEMODIALYSIS TREATMENT NOTE    Date: 3/21/2019  Time: 4:48 PM    Data:  Pre Wt: 56.5    Desired Wt: 54.5 kg   Post Wt:  56.5  Weight gain:   0 kg   Weight change: 0  kg  Ultrafiltration - Post Run Net Total Removed (mL): 30 mL  Ultrafiltration - Post Run Net Total Gain (mL): 0 mL  Vascular Access Status: Yes, secured and visible  Dialyzer Rinse: Streaked, Light  Total Blood Volume Processed: 6.9  Total Dialysis (Treatment) Time:  24 mins     Lab:   No    Interventions:  Pt catheter non functioning. Unable to aspirate either lumen. Appears to be positional. Provider aware and attempted to aspirate each  Lumen. TPA was not used as provider/nurse were unsure if upon instilling they would be able to remove. BS checked at 1600 for 164. Attempt to start treatment was made, pt was not able to sustain BFR rate above 200 with frequent arterial pressure alarms and line collapse.     Assessment: pt reports feeling SOB with exertion and minimal pain mostly soreness. Pt has large dressing on chest so no tunneled line was placed today.       Plan:    willl continue to monitor and follow POC per renal team

## 2019-03-21 NOTE — ANESTHESIA CARE TRANSFER NOTE
Patient: Everton Larios    Procedure(s):  Incision And Drainage; Evacuation Right Chest Wall Hematoma    Diagnosis: Heart Failure  Diagnosis Additional Information: No value filed.    Anesthesia Type:   No value filed.     Note:  Airway :Nasal Cannula  Patient transferred to:PACU  Comments: Awake with good patent airway.  VSSHandoff Report: Identifed the Patient, Identified the Reponsible Provider, Reviewed the pertinent medical history, Discussed the surgical course, Reviewed Intra-OP anesthesia mangement and issues during anesthesia, Set expectations for post-procedure period and Allowed opportunity for questions and acknowledgement of understanding      Vitals: (Last set prior to Anesthesia Care Transfer)    CRNA VITALS  3/21/2019 0833 - 3/21/2019 0910      3/21/2019             Pulse:  94    SpO2:  99 %    Resp Rate (set):  10                Electronically Signed By: TAI Plata CRNA  March 21, 2019  9:10 AM

## 2019-03-21 NOTE — BRIEF OP NOTE
[facility], [place]    Brief Operative Note    Pre-operative diagnosis: Old pacer pocket hematoma, right chest wall    Post-operative diagnosis Old pacer pocket hematoma, right chest wall  Procedure: Procedure(s):  Incision And Drainage; Evacuation Right Chest Wall Hematoma  Surgeon: Surgeon(s) and Role:     * Dewayne House MD - Primary   Sheila Ann PA-C-Assisting   Anesthesia: General   Estimated blood loss: Minimal  Drains: 24F lena drain to old pacer pocket on right chest wall   Specimens: * No specimens in log *  Findings:   None.  Complications: None.  Implants: None.    Sheila Ann PA-C  Cardiothoracic Surgery  Pager [phone]  March 21, 2019

## 2019-03-21 NOTE — OP NOTE
OPERATIVE DATE: 3/21/2019    PRE-OPERATIVE DIAGNOSIS:  1) Nonischemic cardiomyopathy  2) H/O congenital ASD repair  3) Paroxysmal atrial fibrillation  4) Ventricular tachycardia  5) Ulcerative colitis  6) S/P ablation for atrial fibrillation  7) H/O AICD  8) Degenerative joint disease  9) Pierce's esophagus  10) Intermittent asthma  11) Chronic rhinitis  12) Depression  13) Iron deficiency anemia  14) Right chest wall hematoma of AICD pocket     POST-OPERATIVE DIAGNOSIS:  1) Nonischemic cardiomyopathy  2) H/O congenital ASD repair  3) Paroxysmal atrial fibrillation  4) Ventricular tachycardia  5) Ulcerative colitis  6) S/P ablation for atrial fibrillation  7) H/O AICD  8) Degenerative joint disease  9) Pierce's esophagus  10) Intermittent asthma  11) Chronic rhinitis  12) Depression  13) Iron deficiency anemia  14) Right chest wall hematoma of AICD pocket    PROCEDURE:  1) Evacuation of chest wall hematoma  2) Placement of drain    SURGEON: Dewayne House MD    ASSISTANT: Sheila Ram PA-C    ANESTHESIA: GETA    ESTIMATED BLOOD LOSS: 50cc    INDICATIONS:  Mr. ANA ROSA BALDWIN is a 53 year old male admitted with nonischemic cardiomyopathy.  The patient had orthotopic heart transplant.  Postoperatively he developed a right chest wall hematoma at old AICD site.  I recommended evacuation and drainage of this hematoma.  Risks and benefits of the operation were explained to the patient and their family including, but not limited to, bleeding, infection, stroke and even death.  They understood these risks and agreed to proceed electively.    OPERATIVE REPORT:  The patient was transferred to the operating room and positioned supine on the OR table.  General anesthesia was initiated by the anesthesia team.  Endotracheal intubation and IV access was placed. The patients neck, chest, abdomen and bilateral lower extremities were clipped, prepped and draped in sterile fashion.  A pre-procedure time-out was  performed confirming the correct patient, correct site and correct procedure.    The previous skin incision was opened.  A large amount of clot was evacuated.  The pocket was irrigated and investigated for hemostasis.  A 24F lena drain was placed.  The wound was closed in layers using vicryl stitches.      The patient was then transferred from the operating bed to PACU in stable condition.    All needle, sponge and instrument counts were correct at the end of the case.    Deawyne House  Cardiothoracic Surgery  Pager: [phone]

## 2019-03-21 NOTE — PROGRESS NOTES
Nephrology Progress Note  March 19, 2019      Today Recommendations:  Plan for HD today  Recommend holding off TDC as now making more than 500cc UOP.     ASSESSMENT AND RECOMMENDATIONS:   Mr. Larios is a 53 year old male with history of chronic renal insufficiency, EtOH abuse, GIB w/splenic embolization, PAF s/p ablation and cardioversion, NICM c/b cardiogenic shock requiring IABP, s/p OHT (2/24/19) on tacrolimus and MMF, complicated by RV dysfunction per echo and acute oliguric kidney injury.      # Anuric FRANKLIN on CKD stage II-III:  Patient with history of chronic renal insufficiency and cortical scarring of the L kidney per Renal US 03/2016. Baseline Crt 1.1- 1.4 over the last 6 months; however, prior BMPs have demonstrated patient Crt as high as 2.0. He most likely has a baseline level of CKD due to renal artery stenosis (longstanding smoking history, abdominal bruit on physical examination) and possible smaller FRANKLIN episodes from HFrEF exacerbations. Current rise in creatinine started 3/1 at 1.89, and increasing to 4.01 3/7 and oliguria is most likely due to nephrotoxicity associated with tacrolimus (a renal vasoconstrictor), and diuretic use.  ----  At this time, remains oliguric but UOP is improving No labs today to assess clearance. Will plan HD today, but trend UOP and Creatinine over the weekend.  Suspect he could have some recovery of function.     Immunosuppressants:  MMF, Tacrolimus, managed by transplant team.      #Electrolytes. Acid-base:  No labs today.     #Hypotension-Volume status:  Acceptable BP. Plan to pull UF and Target 54 kg.     #Anemia  #Thrombocytopenia:  Hgb and Plt stable at this time.     Recommendations were communicated to primary team via this note.     Seen and discussed with Dr. Marcano.     JANET Herbert MD  [id number]    Interval History :   Nursing and provider notes from last 24 hours reviewed.  BELKIS. Seen at HD. OR this AM for R Chest Hematoma evacuation. Difficulty achieving  adequate flows with R internal jugular catheter today. Otherwise, denies SOB. Pain controlled. Making more urine. No fevers.    MEDICATIONS:  Current Meds    B and C vitamin Complex with folic acid  5 mL Per Feeding Tube Daily     DULoxetine  20 mg Oral Daily     fiber modular (NUTRISOURCE FIBER)  1 packet Per Feeding Tube Q6H     fluticasone  2 puff Inhalation Q12H     gelatin absorbable  1 each Topical During Hemodialysis (from stock)     heparin lock flush  5-15 mL Intracatheter Q24H     insulin aspart  1-6 Units Subcutaneous Q4H     levothyroxine  100 mcg Oral Daily     lidocaine  1 patch Transdermal Q24H     lidocaine   Transdermal Q8H     lidocaine   Transdermal Q24h     melatonin  3 mg Oral At Bedtime     mesalamine  2,400 mg Oral BID     montelukast  10 mg Oral At Bedtime     mycophenolate  1,000 mg Oral BID IS     nystatin  500,000 Units Oral 4x Daily     piperacillin-tazobactam  3.375 g Intravenous Q6H     predniSONE  15 mg Oral BID w/meals     ranitidine  150 mg Oral Daily     rosuvastatin  10 mg Oral Daily     senna-docusate  1 tablet Oral BID    Or     senna-docusate  2 tablet Oral BID     sildenafil  20 mg Oral TID     sodium chloride (PF)  10 mL Intracatheter Q8H     sodium chloride (PF)  3 mL Intracatheter Q8H     sodium chloride (PF)  9 mL Intracatheter During Hemodialysis (from stock)     sodium chloride (PF)  9 mL Intracatheter During Hemodialysis (from stock)     tacrolimus  1.5 mg Oral QAM     tacrolimus  2 mg Oral QPM     Infusion Meds    IV fluid REPLACEMENT ONLY       - MEDICATION INSTRUCTIONS -       BETA BLOCKER NOT PRESCRIBED       REVIEW OF SYSTEMS:  A comprehensive of systems was not obtained due to mental status.      PHYSICAL EXAM:   Temp  Av.9  F (36.6  C)  Min: 95.9  F (35.5  C)  Max: 100  F (37.8  C)  Arterial Line BP  Min: 68/56  Max: 182/36  Arterial Line BP 2  Min: 90/54  Max: 107/56  Arterial Line MAP (mmHg)  Av.7 mmHg  Min: 57 mmHg  Max: 123 mmHg Arterial Line  "Location 2: Right femoral    Pulse  Av.5  Min: 59  Max: 140 Resp  Av.1  Min: 8  Max: 45  FiO2 (%)  Av %  Min: 40 %  Max: 100 %  SpO2  Av.1 %  Min: 88 %  Max: 100 %    CVP (mmHg): 21 mmHg  /77   Pulse 86   Temp 98.3  F (36.8  C) (Oral)   Resp 16   Ht 1.727 m (5' 8\")   Wt 56.5 kg (124 lb 9.6 oz)   SpO2 100%   BMI 18.95 kg/m     Date 19 07 - 19 0659   Shift 8542-1621 7159-5851 2511-1242 24 Hour Total   INTAKE   P.O. 100   100   I.V. 70.4   70.4   Shift Total(mL/kg) 170.4(2.74)   170.4(2.74)   OUTPUT   Chest Tube(mL/kg) 238(3.83)   238(3.83)   Shift Total(mL/kg) 238(3.83)   238(3.83)   Weight (kg) 62.1 62.1 62.1 62.1      Admit Weight: 63.5 kg (140 lb 1.6 oz)     GENERAL APPEARANCE: Alert  Lymphatics: no cervical LAD  HEENT: NGT in place, sclerae anicteric  Pulmonary: lungs clear to auscultation with equal breath sounds bilaterally  CV: regular rhythm, normal rate   - Edema: None  GI: soft, nontender, normal bowel sounds.  SKIN: no rash, warm - R Chest Dressing in place  NEURO: face symmetric, AO x 3    LABS:   I personally reviewed the labs.     CMP  Recent Labs   Lab 19  0450 19  0400 19  0505 19  0455  03/15/19  0409    132* 132* 135   < > 137   POTASSIUM 4.0 4.8 4.1 3.8   < > 3.6   CHLORIDE 98 99 100 102   < > 105   CO2 20 20 20 20   < > 22   ANIONGAP 15* 13 11 13   < > 10   * 97 200* 221*   < > 136*   BUN 58* 67* 52* 30   < > 18   CR 2.31* 2.39* 2.04* 1.44*   < > 1.44*   GFRESTIMATED 30* 29* 35* 54*   < > 54*   GFRESTBLACK 35* 34* 41* 62   < > 62   RADAMES 7.9* 8.2* 8.0* 7.6*   < > 7.4*   MAG 2.2 2.2 2.1 2.1   < >  --    PHOS 4.9* 4.3 3.0 2.6   < > 2.7   PROTTOTAL  --   --  5.3*  --   --  4.4*   ALBUMIN  --   --  2.6*  --   --  2.3*   BILITOTAL  --   --  0.8  --   --  0.8   ALKPHOS  --   --  137  --   --  88   AST  --   --  36  --   --  20   ALT  --   --  57  --   --  24    < > = values in this interval not displayed.     CBC  Recent " Labs   Lab 03/21/19  0710 03/20/19  0450 03/19/19  0400 03/18/19  0505   HGB 7.3* 7.7* 7.3* 7.7*   WBC 10.5 10.2 12.1* 13.7*   RBC 2.39* 2.51* 2.39* 2.53*   HCT 23.6* 24.8* 23.2* 24.4*   MCV 99 99 97 96   MCH 30.5 30.7 30.5 30.4   MCHC 30.9* 31.0* 31.5 31.6   RDW 18.6* 19.1* 18.1* 17.9*   PLT 80* 78* 43* 46*     INR  Recent Labs   Lab 03/21/19  0710 03/20/19  0450 03/19/19  0400 03/18/19  0505  03/14/19  1653   INR 1.33* 1.27* 1.24* 1.28*   < > 1.36*   PTT  --   --   --   --   --  46*    < > = values in this interval not displayed.     ABG  No lab results found in last 7 days.   URINE STUDIES  Recent Labs   Lab Test 03/17/19  0045 03/04/19  1303 02/23/19  1921 02/18/19  0957   COLOR Yellow Yellow Yellow Yellow   APPEARANCE Slightly Cloudy Clear Clear Clear   URINEGLC 30* Negative Negative Negative   URINEBILI Negative Negative Negative Negative   URINEKETONE Negative Negative Negative Negative   SG 1.016 1.014 1.008 1.008   UBLD Small* Negative Negative Negative   URINEPH 5.5 5.0 6.0 5.5   PROTEIN 100* Negative Negative Negative   NITRITE Negative Negative Negative Negative   LEUKEST Negative Negative Negative Negative   RBCU 14* 0 <1 <1   WBCU 15* 0 <1 1     No lab results found.  PTH  No lab results found.  IRON STUDIES  Recent Labs   Lab Test 11/15/18  0955 10/11/18  1235   IRON  --  63   FEB  --  457*   IRONSAT  --  14*   NATE 1,045*  --        IMAGING:  Reviewed

## 2019-03-21 NOTE — PROGRESS NOTES
Calorie Count  Intake recorded for: 3/20  Total Kcals: 924 Total Protein: 48g  Kcals from Hospital Food: 924   Protein: 18g  Kcals from Outside Food (average):0  Protein: 0g  # Meals Recorded: 100% cookie, 50% mashed potatoes, cottage cheese.  # Supplements Recorded: 100% 1 Gelatein Plus, 100% 2 Boost Breeze.

## 2019-03-21 NOTE — PLAN OF CARE
D-Admitted on 02/17/19 with cardiogenic shock. Now s/p heart transplant on 02/24/19. On HD. See flow sheets for vs and assessments. C/o abdominal pain when getting out of the chair or leaning forward. Per pt the pain is not new. Up with one assist.  I-Tylenol, warm packs and oxycodone for pain. Tube feedings continue overnight.  Calorie counts. Sputum specimen sent to labs. Dressings to old chest tube sights. R chest tube site oozing. Pre-op scrub completed  A-Pain partially controlled with medications and heat.  P-NPO and stop tube feedings at midnight. R chest wall hematoma washout in OR tomorrow. Pt is aware of the plan.

## 2019-03-21 NOTE — PROGRESS NOTES
ADVANCED HEART FAILURE PROGRESS NOTE    SUBJECTIVE:  No acute events. Dobutamine turned off yesterday. Going to OR this morning for drainage of hematoma.    ROS otherwise negative.    OBJECTIVE:  Vital signs:  Temp: 98.3  F (36.8  C) Temp  Min: 98  F (36.7  C)  Max: 98.3  F (36.8  C)  Heart Rate: 89 Heart Rate  Min: 84  Max: 96  BP: 154/76 Systolic (24hrs), Av , Min:127 , Max:161   Diastolic (24hrs), Av, Min:67, Max:82    Resp: 16 Resp  Min: 16  Max: 18  SpO2: 100 % SpO2  Min: 98 %  Max: 100 %      Intake/Output Summary (Last 24 hours) at 3/21/2019 0702  Last data filed at 3/21/2019 0541  Gross per 24 hour   Intake 635.22 ml   Output 775 ml   Net -139.78 ml       Vitals:    19 0659 19 0900 19 0500   Weight: 56.5 kg (124 lb 8 oz) 56.1 kg (123 lb 10.9 oz) 56.5 kg (124 lb 9.6 oz)       Physical Exam:  GEN:  no distress, lying in bed  CV:  Regular rate and rhythm, no murmur.   CHEST: Subclavian incisions sutured.  Chest tubes. Hematoma at right subclavian site slightly enlarged over the past week with no drainage or erythema  LUNGS:  normal work of breathing, Clear to auscultation bilaterally, no wheezes or crackles.   ABD:  hyperactive bowel sounds, soft, mild RLQ tenderness, nondistended  EXT:  No edema or cyanosis. Normal distal puses  NEURO: no focal deficits      Medications:    BETA BLOCKER NOT PRESCRIBED       IV fluid REPLACEMENT ONLY       isoproterenol (ISUPREL) infusion ADULT       - MEDICATION INSTRUCTIONS -       - MEDICATION INSTRUCTIONS -       [Auto Hold] BETA BLOCKER NOT PRESCRIBED         Current Facility-Administered Medications   Medication Dose Route Frequency     [Auto Hold] sodium chloride 0.9%  250 mL Intravenous Once in dialysis     [Auto Hold] sodium chloride 0.9%  300 mL Hemodialysis Machine Once     [Auto Hold] B and C vitamin Complex with folic acid  5 mL Per Feeding Tube Daily     ceFAZolin  1 g Intravenous See Admin Instructions     ceFAZolin  2 g Intravenous  Pre-Op/Pre-procedure x 1 dose     ceFAZolin  2 g Intravenous Pre-Op/Pre-procedure x 1 dose     [Auto Hold] DULoxetine  20 mg Oral Daily     [Auto Hold] fiber modular (NUTRISOURCE FIBER)  1 packet Per Feeding Tube Q6H     [Auto Hold] fluticasone  2 puff Inhalation Q12H     [Auto Hold] gelatin absorbable  1 each Topical During Hemodialysis (from stock)     [Auto Hold] heparin lock flush  5-15 mL Intracatheter Q24H     [Auto Hold] insulin aspart  1-6 Units Subcutaneous Q4H     [Auto Hold] levothyroxine  100 mcg Oral Daily     [Auto Hold] lidocaine  1 patch Transdermal Q24H     [Auto Hold] lidocaine   Transdermal Q8H     [Auto Hold] lidocaine   Transdermal Q24h     [Auto Hold] melatonin  3 mg Oral At Bedtime     [Auto Hold] mesalamine  2,400 mg Oral BID     [Auto Hold] montelukast  10 mg Oral At Bedtime     [Auto Hold] mycophenolate  1,000 mg Oral BID IS     [Auto Hold] - MEDICATION INSTRUCTIONS -   Does not apply Once     [Auto Hold] nystatin  500,000 Units Oral 4x Daily     [Auto Hold] predniSONE  15 mg Oral BID w/meals     [Auto Hold] ranitidine  150 mg Oral Daily     [Auto Hold] rosuvastatin  10 mg Oral Daily     [Auto Hold] senna-docusate  1 tablet Oral BID    Or     [Auto Hold] senna-docusate  2 tablet Oral BID     [Auto Hold] sildenafil  20 mg Oral TID     [Auto Hold] sodium chloride (PF)  10 mL Intracatheter Q8H     [Auto Hold] sodium chloride (PF)  3 mL Intracatheter Q8H     [Auto Hold] sodium chloride (PF)  9 mL Intracatheter During Hemodialysis (from stock)     [Auto Hold] sodium chloride (PF)  9 mL Intracatheter During Hemodialysis (from stock)     [Auto Hold] tacrolimus  1.5 mg Oral QAM     [Auto Hold] tacrolimus  2 mg Oral QPM         Labs:  [facility]  Recent Labs   Lab 03/20/19  0450 03/19/19  0400 03/18/19  0505  03/15/19  0409  03/14/19  1653    132* 132*   < > 137   < >  --    POTASSIUM 4.0 4.8 4.1   < > 3.6   < >  --    CHLORIDE 98 99 100   < > 105   < >  --    CO2 20 20 20   < > 22   < >  --    BUN  58* 67* 52*   < > 18   < >  --    CR 2.31* 2.39* 2.04*   < > 1.44*   < >  --    RADAMES 7.9* 8.2* 8.0*   < > 7.4*   < >  --    MAG 2.2 2.2 2.1   < >  --   --   --    PHOS 4.9* 4.3 3.0   < > 2.7  --   --    * 97 200*   < > 136*   < >  --    LDH  --   --   --   --   --   --  320*   ALKPHOS  --   --  137  --  88  --   --    BILITOTAL  --   --  0.8  --  0.8  --   --    AST  --   --  36  --  20  --   --    ALT  --   --  57  --  24  --   --     < > = values in this interval not displayed.     BLOOD GASNo lab results found in last 7 days.  CBC  Recent Labs   Lab 03/20/19  0450 03/19/19  0400   WBC 10.2 12.1*   HGB 7.7* 7.3*   HCT 24.8* 23.2*   PLT 78* 43*     COAG  Recent Labs   Lab 03/20/19  0450 03/19/19  0400  03/14/19  1653   INR 1.27* 1.24*   < > 1.36*   PTT  --   --   --  46*    < > = values in this interval not displayed.         ASSESSMENT/PLAN:  Everton Larios is a 54 yo gentleman who underwent orthotopic heart transplant 2/24/19 for non ischemic cardiomyopathy and cardiogenic shock. Post-op complicated by RV dysfunction and FRANKLIN.      Updates today  - biopsy from Tuesday 1R, no AMR, decreasing prednisone to 15/10  - starting Bactrim prophylaxis  - tacro level 8.0, continue current dosing  - start warfarin when stable after hematoma drainage     #Oliguric renal failure:  Etiology cardiorenal from venous congestion vs. nephrotoxins.  - iHD per renal  - nephrology consulted, appreciate assistance  - will need tunneled line for HD, will need to be placed on right side given left-side goretex graft     #Sepsis  - completed antibiotics cefepime/zosyn for 7 day course (ending 3/16/19)  - cultures no growth to date     #S/p OHT on 2/24:  Graft function:  - echo 3/1 shows normal LV function, RV is mild to moderately dilated and hypokinetic  - now off dobutamine  - continue sildenafil 20 q8h     Immunosuppression:  - on MMF decreased at 1000 BID  - Prednisone 15mg BID, decreasing by 5mg daily with negative biopsies  - NOT  on Simulect protocol  - tacrolimus 1.5/2 mg BID (goal 8-10)     Prophylaxis:  - CMV +/+: medium risk, Valcyte held given thrombocytopenia/anemia, will check weekly CMV titers  - PCP: pentamadine given 3/3, starting Bactrim  - Thrush: improved on nystatin  - GI: on PPI  - CAV: Crestor, holding ASA given bleeding at line sites     Biopsies:  - First biopsy 3/4, 0R, no AMR  - Second biopsy 3/11, 0R, no AMR  - Third biopsy 3/18, 1R, no AMR     Goretex conduit for persistent left SVC:  - will need anticoagulation, plan to start warfarin when stable after hematoma drainage  **NO CENTRAL VENOUS CATHETERS ON THE LEFT UPPER BODY**      #Thrombocytopenia/Anemia:   - Hgb continues to drift down after transfusions, no clear source of blood loss but does have slowly expanding right chest wall hematoma  - holding valcyte     Seen and staffed with .      Trev Harris MD  Advanced Heart Failure Fellow  [phone]

## 2019-03-21 NOTE — ANESTHESIA PREPROCEDURE EVALUATION
Anesthesia Pre-Procedure Evaluation    Patient: Everton Larios   MRN:     [id number] Gender:   male   Age:    53 year old :      1965        Preoperative Diagnosis: Heart Failure   Procedure(s):  Incision And Drainage; Evacuation Right Chest Wall Hematoma     Past Medical History:   Diagnosis Date     Alcohol abuse      Pierce's esophagus 10/4/2018     Chronic rhinitis 10/4/2018     Chronic systolic heart failure (H) 10/4/2018     Depression 10/4/2018     Diastolic dysfunction      DJD (degenerative joint disease) - neck 10/4/2018     H/O congenital atrial septal defect (ASD) repair at age 3 10/4/2018     Hypothyroidism due to medication 10/4/2018     ICD, [employer] ; gen change 2018 10/4/2018     Intermittent asthma without complication 10/4/2018     Nonischemic cardiomyopathy (H) 10/4/2018     On amiodarone therapy 10/4/2018     Paroxysmal atrial fibrillation (H) 10/4/2018     S/P ablation of atrial fibrillation 10/4/2018     Systolic heart failure (H)      Ulcerative colitis (H) 10/4/2018     Ventricular tachycardia (H) 10/4/2018      Past Surgical History:   Procedure Laterality Date     AICD, DUAL CHAMBER       CV HEART BIOPSY N/A 3/4/2019    Procedure: Heart Biopsy;  Surgeon: Abhijeet Titus MD;  Location:  HEART CARDIAC CATH LAB     CV INTRA-AORTIC BALLOON PUMP INSERTION N/A 2019    Procedure: Intra-Aortic Balloon;  Surgeon: Alton Solomon MD;  Location:  HEART CARDIAC CATH LAB     CV INTRA-AORTIC BALLOON PUMP INSERTION N/A 2019    Procedure: Replace subclavian IABP;  Surgeon: Yves Rodrigues MD;  Location:  HEART CARDIAC CATH LAB     INSERT INTRAAORTIC BALLOON PUMP Left 2019    Procedure: Insert left  Subclavian Balloon Pump,  Removal Right femoral arterial balloom pump sheath;  Surgeon: Dewayne House MD;  Location:  OR     INSERT INTRAAORTIC BALLOON PUMP Left 2019    Procedure: SUBCLAVIAN BALLOON PUMP PLACEMENT;  Surgeon:  Ben White MD;  Location: [facility] OR     TRANSPLANT HEART RECIPIENT N/A 2/24/2019    Procedure: TRANSPLANT HEART RECIPIENT;  Surgeon: Dewayne House MD;  Location: [facility] OR          Anesthesia Evaluation     . Pt has had prior anesthetic. Type: General    No history of anesthetic complications          ROS/MED HX    ENT/Pulmonary:     (+)asthma , . .    Neurologic:  - neg neurologic ROS     Cardiovascular: Comment: S/p heart transplant        METS/Exercise Tolerance:     Hematologic:     (+) Anemia, -      Musculoskeletal:  - neg musculoskeletal ROS       GI/Hepatic:  - neg GI/hepatic ROS       Renal/Genitourinary:  - ROS Renal section negative       Endo:     (+) thyroid problem .      Psychiatric:  - neg psychiatric ROS       Infectious Disease:  - neg infectious disease ROS       Malignancy:      - no malignancy   Other:                     [facility] AN PHYSICAL EXAM    Lab Results   Component Value Date    WBC 10.5 03/21/2019    HGB 7.3 (L) 03/21/2019    HCT 23.6 (L) 03/21/2019    PLT 80 (L) 03/21/2019    CRP 6.1 11/15/2018     03/20/2019    POTASSIUM 4.0 03/20/2019    CHLORIDE 98 03/20/2019    CO2 20 03/20/2019    BUN 58 (H) 03/20/2019    CR 2.31 (H) 03/20/2019     (H) 03/20/2019    RADAMES 7.9 (L) 03/20/2019    PHOS 4.9 (H) 03/20/2019    MAG 2.2 03/20/2019    ALBUMIN 2.6 (L) 03/18/2019    PROTTOTAL 5.3 (L) 03/18/2019    ALT 57 03/18/2019    AST 36 03/18/2019    ALKPHOS 137 03/18/2019    BILITOTAL 0.8 03/18/2019    LIPASE 237 03/18/2019    AMYLASE 51 03/18/2019    PTT 46 (H) 03/14/2019    INR 1.33 (H) 03/21/2019    FIBR 189 (L) 03/14/2019    TSH 1.05 03/09/2019    T4 1.32 01/02/2019       Preop Vitals  BP Readings from Last 3 Encounters:   03/21/19 103/72   01/30/19 99/74   01/14/19 100/66    Pulse Readings from Last 3 Encounters:   03/21/19 85   01/30/19 77   01/14/19 85      Resp Readings from Last 3 Encounters:   03/21/19 16   11/28/18 18   11/23/18 16    SpO2 Readings from Last 3 Encounters:  "  03/21/19 98%   01/30/19 98%   01/14/19 94%      Temp Readings from Last 1 Encounters:   03/21/19 36.4  C (97.5  F)    Ht Readings from Last 1 Encounters:   02/17/19 1.727 m (5' 8\")      Wt Readings from Last 1 Encounters:   03/21/19 56.5 kg (124 lb 9.6 oz)    Estimated body mass index is 18.95 kg/m  as calculated from the following:    Height as of this encounter: 1.727 m (5' 8\").    Weight as of this encounter: 56.5 kg (124 lb 9.6 oz).     LDA:  Peripheral IV 02/24/19 Left Lower forearm (Active)   Site Assessment WDL except;Ecchymotic 3/21/2019 12:00 PM   Line Status Saline locked 3/21/2019 12:00 PM   Phlebitis Scale 0-->no symptoms 3/21/2019 12:00 PM   Infiltration Scale 0 3/21/2019 12:00 PM   Infiltration Site Treatment Method  None 3/8/2019  4:00 AM   Extravasation? No 3/19/2019  3:30 PM   Dressing Intervention Dressing reinforced 3/19/2019  3:30 PM   Number of days: 25       CVC Double Lumen 03/08/19 Right Internal jugular (Active)   Site Assessment WDL 3/21/2019 12:00 PM   Lumen Soln/Vol REFERENCE 1.6/1.6 3/19/2019  9:15 AM   Dressing Intervention Chlorhexidine sponge 3/19/2019  9:15 AM   Dressing Change Due 03/26/19 3/20/2019 12:00 AM   CVC Comment CDI 3/19/2019  9:15 AM   Lumen A - Color RED 3/21/2019  9:07 AM   Lumen A - Status saline locked 3/21/2019  9:07 AM   Lumen B - Color BLUE 3/21/2019  9:07 AM   Lumen B - Status saline locked 3/21/2019  9:07 AM   Extravasation? No 3/19/2019  3:30 PM   Number of days: 13       Right Groin Interventional Procedure Access (Active)   Site Assessment Ecchymotic 3/20/2019  7:15 PM   Hemostasis management Unchanged 3/20/2019 11:05 AM   CMS Right Extremity WDL 3/20/2019  4:00 PM   Dorsalis Pulse - Right Leg Normal 3/20/2019  4:20 AM   Number of days: 2       Midline Catheter Double Lumen (Active)   Site Assessment WDL 3/21/2019 12:00 PM   Dressing Change Due 03/25/19 3/20/2019 12:00 AM   Lumen A - Color WHITE 3/21/2019 12:00 PM   Lumen A - Status infusing 3/21/2019 12:00 " PM   Lumen B - Color PURPLE 3/21/2019 12:00 PM   Lumen B - Status saline locked 3/21/2019 12:00 PM   Number of days: 2       Closed/Suction Drain Lateral;Right;Superior Chest Bulb 24 Sami (Active)   Site Description WDL 3/21/2019 12:00 PM   Drainage Appearance Bloody/Bright Red 3/21/2019  1:26 PM   Status To bulb suction 3/21/2019 12:00 PM   Output (ml) 30 ml 3/21/2019  1:26 PM   Number of days: 0       NG/OG Tube 10 fr Left nostril (Active)   Site Description WDL 3/21/2019 11:00 AM   Status Clamped 3/21/2019 11:00 AM   Placement Check [place] unchanged 3/21/2019 11:00 AM   [place] (cm marking) at nare/mouth 83 cm 3/21/2019 11:00 AM   Intake (ml) 60 ml 3/20/2019  3:00 AM   Flush/Free Water (mL) 30 mL 3/21/2019 12:00 AM   Number of days: 10            Assessment:   ASA SCORE: 4    NPO Status: > 2 hours since completed Clear Liquids; > 4 hours since completed Breast Milk   Documentation: H&P complete; Preop Testing complete; Consents complete   Proceeding: Proceed without further delay  Tobacco Use:  NO Active use of Tobacco/UNKNOWN Tobacco use status     Plan:   Anes. Type:  General      Induction:  IV (Standard)   Airway: Oral ETT   Access/Monitoring: PIV   Maintenance: Balanced   Emergence: Procedure Site   Logistics: ICU Admission     Postop Pain/Sedation Strategy:  Standard-Options: Opioids PRN     PONV Management:  Adult Risk Factors:, Non-Smoker, Postop Opioids  Prevention: Ondansetron     CONSENT: Direct conversation   Plan and risks discussed with: Patient   Blood Products: Consented (ALL Blood Products)                         Paulette Christian MD

## 2019-03-22 ENCOUNTER — RECORDS - HEALTHEAST (OUTPATIENT)
Dept: ADMINISTRATIVE | Facility: OTHER | Age: 54
End: 2019-03-22

## 2019-03-22 ENCOUNTER — APPOINTMENT (OUTPATIENT)
Dept: OCCUPATIONAL THERAPY | Facility: CLINIC | Age: 54
DRG: 001 | End: 2019-03-22
Attending: INTERNAL MEDICINE
Payer: COMMERCIAL

## 2019-03-22 ENCOUNTER — APPOINTMENT (OUTPATIENT)
Dept: GENERAL RADIOLOGY | Facility: CLINIC | Age: 54
DRG: 001 | End: 2019-03-22
Attending: INTERNAL MEDICINE
Payer: COMMERCIAL

## 2019-03-22 LAB
ANION GAP SERPL CALCULATED.3IONS-SCNC: 11 MMOL/L (ref 3–14)
ANION GAP SERPL CALCULATED.3IONS-SCNC: 16 MMOL/L (ref 3–14)
BACTERIA SPEC CULT: ABNORMAL
BACTERIA SPEC CULT: ABNORMAL
BUN SERPL-MCNC: 84 MG/DL (ref 7–30)
BUN SERPL-MCNC: 89 MG/DL (ref 7–30)
CALCIUM SERPL-MCNC: 7.8 MG/DL (ref 8.5–10.1)
CALCIUM SERPL-MCNC: 8.2 MG/DL (ref 8.5–10.1)
CHLORIDE SERPL-SCNC: 98 MMOL/L (ref 94–109)
CHLORIDE SERPL-SCNC: 99 MMOL/L (ref 94–109)
CO2 SERPL-SCNC: 19 MMOL/L (ref 20–32)
CO2 SERPL-SCNC: 20 MMOL/L (ref 20–32)
CREAT SERPL-MCNC: 2.66 MG/DL (ref 0.66–1.25)
CREAT SERPL-MCNC: 2.82 MG/DL (ref 0.66–1.25)
ERYTHROCYTE [DISTWIDTH] IN BLOOD BY AUTOMATED COUNT: 18.9 % (ref 10–15)
ERYTHROCYTE [DISTWIDTH] IN BLOOD BY AUTOMATED COUNT: 19 % (ref 10–15)
GFR SERPL CREATININE-BSD FRML MDRD: 24 ML/MIN/{1.73_M2}
GFR SERPL CREATININE-BSD FRML MDRD: 26 ML/MIN/{1.73_M2}
GLUCOSE BLDC GLUCOMTR-MCNC: 121 MG/DL (ref 70–99)
GLUCOSE BLDC GLUCOMTR-MCNC: 128 MG/DL (ref 70–99)
GLUCOSE BLDC GLUCOMTR-MCNC: 129 MG/DL (ref 70–99)
GLUCOSE BLDC GLUCOMTR-MCNC: 141 MG/DL (ref 70–99)
GLUCOSE SERPL-MCNC: 107 MG/DL (ref 70–99)
GLUCOSE SERPL-MCNC: 135 MG/DL (ref 70–99)
HCT VFR BLD AUTO: 22.3 % (ref 40–53)
HCT VFR BLD AUTO: 23.3 % (ref 40–53)
HGB BLD-MCNC: 7 G/DL (ref 13.3–17.7)
HGB BLD-MCNC: 7.1 G/DL (ref 13.3–17.7)
INR PPP: 1.35 (ref 0.86–1.14)
INR PPP: 1.37 (ref 0.86–1.14)
MAGNESIUM SERPL-MCNC: 1.9 MG/DL (ref 1.6–2.3)
MAGNESIUM SERPL-MCNC: 2 MG/DL (ref 1.6–2.3)
MCH RBC QN AUTO: 30.1 PG (ref 26.5–33)
MCH RBC QN AUTO: 30.7 PG (ref 26.5–33)
MCHC RBC AUTO-ENTMCNC: 30.5 G/DL (ref 31.5–36.5)
MCHC RBC AUTO-ENTMCNC: 31.4 G/DL (ref 31.5–36.5)
MCV RBC AUTO: 98 FL (ref 78–100)
MCV RBC AUTO: 99 FL (ref 78–100)
PHOSPHATE SERPL-MCNC: 5.3 MG/DL (ref 2.5–4.5)
PHOSPHATE SERPL-MCNC: 5.5 MG/DL (ref 2.5–4.5)
PLATELET # BLD AUTO: 82 10E9/L (ref 150–450)
PLATELET # BLD AUTO: 88 10E9/L (ref 150–450)
POTASSIUM SERPL-SCNC: 4.2 MMOL/L (ref 3.4–5.3)
POTASSIUM SERPL-SCNC: 4.4 MMOL/L (ref 3.4–5.3)
RBC # BLD AUTO: 2.28 10E12/L (ref 4.4–5.9)
RBC # BLD AUTO: 2.36 10E12/L (ref 4.4–5.9)
SODIUM SERPL-SCNC: 130 MMOL/L (ref 133–144)
SODIUM SERPL-SCNC: 132 MMOL/L (ref 133–144)
SPECIMEN SOURCE: ABNORMAL
TACROLIMUS BLD-MCNC: 8.1 UG/L (ref 5–15)
TME LAST DOSE: NORMAL H
WBC # BLD AUTO: 7.5 10E9/L (ref 4–11)
WBC # BLD AUTO: 8.6 10E9/L (ref 4–11)

## 2019-03-22 PROCEDURE — 25000132 ZZH RX MED GY IP 250 OP 250 PS 637: Performed by: STUDENT IN AN ORGANIZED HEALTH CARE EDUCATION/TRAINING PROGRAM

## 2019-03-22 PROCEDURE — 84100 ASSAY OF PHOSPHORUS: CPT | Performed by: PHYSICIAN ASSISTANT

## 2019-03-22 PROCEDURE — 97110 THERAPEUTIC EXERCISES: CPT | Mod: GO

## 2019-03-22 PROCEDURE — G0463 HOSPITAL OUTPT CLINIC VISIT: HCPCS

## 2019-03-22 PROCEDURE — 85610 PROTHROMBIN TIME: CPT | Performed by: THORACIC SURGERY (CARDIOTHORACIC VASCULAR SURGERY)

## 2019-03-22 PROCEDURE — 40000986 XR CHEST PORT 1 VW

## 2019-03-22 PROCEDURE — 25000125 ZZHC RX 250: Performed by: INTERNAL MEDICINE

## 2019-03-22 PROCEDURE — 25000132 ZZH RX MED GY IP 250 OP 250 PS 637: Performed by: SURGERY

## 2019-03-22 PROCEDURE — 25000132 ZZH RX MED GY IP 250 OP 250 PS 637: Performed by: PHYSICIAN ASSISTANT

## 2019-03-22 PROCEDURE — 40000802 ZZH SITE CHECK

## 2019-03-22 PROCEDURE — 97535 SELF CARE MNGMENT TRAINING: CPT | Mod: GO

## 2019-03-22 PROCEDURE — 25000128 H RX IP 250 OP 636: Performed by: PHYSICIAN ASSISTANT

## 2019-03-22 PROCEDURE — 25000132 ZZH RX MED GY IP 250 OP 250 PS 637: Performed by: INTERNAL MEDICINE

## 2019-03-22 PROCEDURE — 00000146 ZZHCL STATISTIC GLUCOSE BY METER IP

## 2019-03-22 PROCEDURE — 25000131 ZZH RX MED GY IP 250 OP 636 PS 637: Performed by: INTERNAL MEDICINE

## 2019-03-22 PROCEDURE — 21400000 ZZH R&B CCU UMMC

## 2019-03-22 PROCEDURE — 80197 ASSAY OF TACROLIMUS: CPT | Performed by: THORACIC SURGERY (CARDIOTHORACIC VASCULAR SURGERY)

## 2019-03-22 PROCEDURE — 84100 ASSAY OF PHOSPHORUS: CPT | Performed by: THORACIC SURGERY (CARDIOTHORACIC VASCULAR SURGERY)

## 2019-03-22 PROCEDURE — 25000132 ZZH RX MED GY IP 250 OP 250 PS 637: Performed by: NURSE PRACTITIONER

## 2019-03-22 PROCEDURE — 25000128 H RX IP 250 OP 636: Performed by: STUDENT IN AN ORGANIZED HEALTH CARE EDUCATION/TRAINING PROGRAM

## 2019-03-22 PROCEDURE — 25000128 H RX IP 250 OP 636: Performed by: ANESTHESIOLOGY

## 2019-03-22 PROCEDURE — 85610 PROTHROMBIN TIME: CPT | Performed by: PHYSICIAN ASSISTANT

## 2019-03-22 PROCEDURE — 85027 COMPLETE CBC AUTOMATED: CPT | Performed by: PHYSICIAN ASSISTANT

## 2019-03-22 PROCEDURE — 85027 COMPLETE CBC AUTOMATED: CPT | Performed by: THORACIC SURGERY (CARDIOTHORACIC VASCULAR SURGERY)

## 2019-03-22 PROCEDURE — 25000132 ZZH RX MED GY IP 250 OP 250 PS 637: Performed by: ANESTHESIOLOGY

## 2019-03-22 PROCEDURE — 80048 BASIC METABOLIC PNL TOTAL CA: CPT | Performed by: PHYSICIAN ASSISTANT

## 2019-03-22 PROCEDURE — 80048 BASIC METABOLIC PNL TOTAL CA: CPT | Performed by: THORACIC SURGERY (CARDIOTHORACIC VASCULAR SURGERY)

## 2019-03-22 PROCEDURE — 83735 ASSAY OF MAGNESIUM: CPT | Performed by: THORACIC SURGERY (CARDIOTHORACIC VASCULAR SURGERY)

## 2019-03-22 PROCEDURE — 27210437 ZZH NUTRITION PRODUCT SEMIELEM INTERMED LITER

## 2019-03-22 PROCEDURE — 36415 COLL VENOUS BLD VENIPUNCTURE: CPT | Performed by: THORACIC SURGERY (CARDIOTHORACIC VASCULAR SURGERY)

## 2019-03-22 PROCEDURE — 83735 ASSAY OF MAGNESIUM: CPT | Performed by: PHYSICIAN ASSISTANT

## 2019-03-22 PROCEDURE — 99232 SBSQ HOSP IP/OBS MODERATE 35: CPT | Mod: GC | Performed by: INTERNAL MEDICINE

## 2019-03-22 RX ORDER — LORAZEPAM 0.5 MG/1
0.5 TABLET ORAL ONCE
Status: DISCONTINUED | OUTPATIENT
Start: 2019-03-22 | End: 2019-04-01 | Stop reason: CLARIF

## 2019-03-22 RX ORDER — PANTOPRAZOLE SODIUM 40 MG/1
40 TABLET, DELAYED RELEASE ORAL 2 TIMES DAILY
Status: DISCONTINUED | OUTPATIENT
Start: 2019-03-22 | End: 2019-04-03 | Stop reason: HOSPADM

## 2019-03-22 RX ORDER — BISACODYL 10 MG
10 SUPPOSITORY, RECTAL RECTAL DAILY PRN
Status: DISCONTINUED | OUTPATIENT
Start: 2019-03-22 | End: 2019-04-03 | Stop reason: HOSPADM

## 2019-03-22 RX ORDER — BUMETANIDE 0.25 MG/ML
4 INJECTION INTRAMUSCULAR; INTRAVENOUS ONCE
Status: COMPLETED | OUTPATIENT
Start: 2019-03-22 | End: 2019-03-22

## 2019-03-22 RX ORDER — FENTANYL CITRATE 50 UG/ML
25-50 INJECTION, SOLUTION INTRAMUSCULAR; INTRAVENOUS
Status: DISPENSED | OUTPATIENT
Start: 2019-03-22 | End: 2019-03-23

## 2019-03-22 RX ADMIN — FENTANYL CITRATE 25 MCG: 50 INJECTION, SOLUTION INTRAMUSCULAR; INTRAVENOUS at 15:36

## 2019-03-22 RX ADMIN — PIPERACILLIN AND TAZOBACTAM 2.25 G: 2; .25 INJECTION, POWDER, FOR SOLUTION INTRAVENOUS at 06:45

## 2019-03-22 RX ADMIN — ACETAMINOPHEN 650 MG: 325 TABLET, FILM COATED ORAL at 01:14

## 2019-03-22 RX ADMIN — LEVOTHYROXINE SODIUM 100 MCG: 100 TABLET ORAL at 08:19

## 2019-03-22 RX ADMIN — Medication 2.5 MG: at 01:14

## 2019-03-22 RX ADMIN — ACETAMINOPHEN 650 MG: 325 TABLET, FILM COATED ORAL at 21:59

## 2019-03-22 RX ADMIN — ONDANSETRON 4 MG: 2 INJECTION INTRAMUSCULAR; INTRAVENOUS at 02:49

## 2019-03-22 RX ADMIN — Medication 500000 UNITS: at 17:19

## 2019-03-22 RX ADMIN — ROSUVASTATIN CALCIUM 10 MG: 10 TABLET, FILM COATED ORAL at 08:19

## 2019-03-22 RX ADMIN — Medication 2.5 MG: at 05:14

## 2019-03-22 RX ADMIN — FLUTICASONE PROPIONATE 2 PUFF: 220 AEROSOL, METERED RESPIRATORY (INHALATION) at 19:57

## 2019-03-22 RX ADMIN — ACETAMINOPHEN 650 MG: 325 TABLET, FILM COATED ORAL at 10:47

## 2019-03-22 RX ADMIN — SILDENAFIL 20 MG: 20 TABLET ORAL at 19:57

## 2019-03-22 RX ADMIN — BISACODYL 10 MG: 10 SUPPOSITORY RECTAL at 18:40

## 2019-03-22 RX ADMIN — Medication 500000 UNITS: at 08:21

## 2019-03-22 RX ADMIN — PIPERACILLIN AND TAZOBACTAM 2.25 G: 2; .25 INJECTION, POWDER, FOR SOLUTION INTRAVENOUS at 12:40

## 2019-03-22 RX ADMIN — SENNOSIDES AND DOCUSATE SODIUM 2 TABLET: 8.6; 5 TABLET ORAL at 19:57

## 2019-03-22 RX ADMIN — PROCHLORPERAZINE MALEATE 5 MG: 5 TABLET, FILM COATED ORAL at 00:22

## 2019-03-22 RX ADMIN — MYCOPHENOLATE MOFETIL 1000 MG: 500 TABLET, FILM COATED ORAL at 08:19

## 2019-03-22 RX ADMIN — METHOCARBAMOL 500 MG: 500 TABLET, FILM COATED ORAL at 08:19

## 2019-03-22 RX ADMIN — Medication 5 ML: at 08:21

## 2019-03-22 RX ADMIN — TACROLIMUS 1.5 MG: 1 CAPSULE ORAL at 08:20

## 2019-03-22 RX ADMIN — ACETAMINOPHEN 650 MG: 325 TABLET, FILM COATED ORAL at 05:14

## 2019-03-22 RX ADMIN — Medication 2.5 MG: at 10:47

## 2019-03-22 RX ADMIN — METHOCARBAMOL 500 MG: 500 TABLET, FILM COATED ORAL at 20:09

## 2019-03-22 RX ADMIN — MONTELUKAST SODIUM 10 MG: 10 TABLET, COATED ORAL at 21:59

## 2019-03-22 RX ADMIN — MESALAMINE 2400 MG: 800 TABLET, DELAYED RELEASE ORAL at 08:19

## 2019-03-22 RX ADMIN — BENZOCAINE, MENTHOL 1 LOZENGE: 15; 3.6 LOZENGE ORAL at 11:19

## 2019-03-22 RX ADMIN — BUMETANIDE 4 MG: 0.25 INJECTION INTRAMUSCULAR; INTRAVENOUS at 14:26

## 2019-03-22 RX ADMIN — DULOXETINE 20 MG: 20 CAPSULE, DELAYED RELEASE ORAL at 08:20

## 2019-03-22 RX ADMIN — RANITIDINE 150 MG: 150 TABLET ORAL at 08:20

## 2019-03-22 RX ADMIN — PREDNISONE 10 MG: 10 TABLET ORAL at 19:57

## 2019-03-22 RX ADMIN — SILDENAFIL 20 MG: 20 TABLET ORAL at 08:19

## 2019-03-22 RX ADMIN — PANTOPRAZOLE SODIUM 40 MG: 40 TABLET, DELAYED RELEASE ORAL at 12:37

## 2019-03-22 RX ADMIN — TACROLIMUS 2 MG: 1 CAPSULE ORAL at 17:20

## 2019-03-22 RX ADMIN — SILDENAFIL 20 MG: 20 TABLET ORAL at 14:26

## 2019-03-22 RX ADMIN — ACETAMINOPHEN 650 MG: 325 TABLET, FILM COATED ORAL at 17:49

## 2019-03-22 RX ADMIN — Medication 2.5 MG: at 17:49

## 2019-03-22 RX ADMIN — TRAMADOL HYDROCHLORIDE 50 MG: 50 TABLET, COATED ORAL at 08:19

## 2019-03-22 RX ADMIN — MELATONIN TAB 3 MG 3 MG: 3 TAB at 19:57

## 2019-03-22 RX ADMIN — Medication 2.5 MG: at 21:59

## 2019-03-22 RX ADMIN — FLUTICASONE PROPIONATE 2 PUFF: 220 AEROSOL, METERED RESPIRATORY (INHALATION) at 08:21

## 2019-03-22 RX ADMIN — PROCHLORPERAZINE EDISYLATE 5 MG: 5 INJECTION INTRAMUSCULAR; INTRAVENOUS at 05:15

## 2019-03-22 RX ADMIN — MESALAMINE 2400 MG: 800 TABLET, DELAYED RELEASE ORAL at 19:56

## 2019-03-22 RX ADMIN — POLYETHYLENE GLYCOL 3350 17 G: 17 POWDER, FOR SOLUTION ORAL at 11:19

## 2019-03-22 RX ADMIN — SENNOSIDES AND DOCUSATE SODIUM 2 TABLET: 8.6; 5 TABLET ORAL at 08:20

## 2019-03-22 RX ADMIN — MYCOPHENOLATE MOFETIL 1000 MG: 500 TABLET, FILM COATED ORAL at 17:20

## 2019-03-22 RX ADMIN — PANTOPRAZOLE SODIUM 40 MG: 40 TABLET, DELAYED RELEASE ORAL at 19:57

## 2019-03-22 RX ADMIN — FENTANYL CITRATE 25 MCG: 50 INJECTION, SOLUTION INTRAMUSCULAR; INTRAVENOUS at 16:05

## 2019-03-22 RX ADMIN — Medication 500000 UNITS: at 19:56

## 2019-03-22 RX ADMIN — MAGNESIUM HYDROXIDE 30 ML: 400 SUSPENSION ORAL at 17:25

## 2019-03-22 RX ADMIN — LACTULOSE 20 G: 20 POWDER, FOR SOLUTION ORAL at 14:26

## 2019-03-22 RX ADMIN — PIPERACILLIN AND TAZOBACTAM 2.25 G: 2; .25 INJECTION, POWDER, FOR SOLUTION INTRAVENOUS at 00:22

## 2019-03-22 RX ADMIN — PIPERACILLIN AND TAZOBACTAM 2.25 G: 2; .25 INJECTION, POWDER, FOR SOLUTION INTRAVENOUS at 19:56

## 2019-03-22 RX ADMIN — PREDNISONE 15 MG: 10 TABLET ORAL at 08:20

## 2019-03-22 ASSESSMENT — ACTIVITIES OF DAILY LIVING (ADL)
ADLS_ACUITY_SCORE: 14
ADLS_ACUITY_SCORE: 13
ADLS_ACUITY_SCORE: 14
ADLS_ACUITY_SCORE: 13
ADLS_ACUITY_SCORE: 14
ADLS_ACUITY_SCORE: 14

## 2019-03-22 ASSESSMENT — PAIN DESCRIPTION - DESCRIPTORS
DESCRIPTORS: ACHING;CONSTANT
DESCRIPTORS: ACHING
DESCRIPTORS: SHARP
DESCRIPTORS: CRAMPING
DESCRIPTORS: DISCOMFORT

## 2019-03-22 NOTE — PROGRESS NOTES
Calorie Count  Intake recorded for: 3/21  Total Kcals: 586 Total Protein: 24g  Kcals from Hospital Food: 586   Protein: 24g  Kcals from Outside Food (average): 0  Protein: 0g  # Meals Recorded: 100% ice cream, 50% omelet w/ ham & cheese, [place] toast w/ syrup.  # Supplements Recorded: 0

## 2019-03-22 NOTE — PLAN OF CARE
D: NICM with cardiogenic shock s/p OHT on 2/24/2019. Recovery c/b mild decreased RV funciton per echo and FRANKLIN now on T/Th/Sa hemodialysis.     I: Monitored vitals and assessed pt status.   Changed: started zosyn Q6H; started bactrim/septra   Running: n/a  PRN: zofran, tylenol, tramadol, oxycodone, robaxin.        A: R internal jugular HD cath not working, no HD today. Evacuation of right chest wall hematoma in OR today.   Neuro: A&O x 4.   Cardiac: SR  Respiratory: WDL, infrequent productive cough. On capnography post-procedure.  GI/: abdominal pain, nausea, dry-heaving @ 2200. Provider notified. Zofran, tramadol, oxycodone, tylenol, robaxin given.  Diet/appetite: regular diet, 2 L FR, tube feedings 80 mL/hr into nasoduodenal tube 8P-8A. Held for nausea. Not tolerating.  Activity: Ax1  Pain: abdominal wall pain  Skin: sternal incision, L chest incision, R chest incision, skin tear L forearm, R femoral cath site, blanchable redness on sacrum with preventative Mepilex  LDAs: TIFFANY drain from R chest place today, 15 mL output this shift; R DL midline, heparin locked; L PIV saline locked.    P: Continue to monitor Pt status and report changes to treatment team. Nephrology following, to decide if pt needs new HD access after tomorrow's AM labs.

## 2019-03-22 NOTE — PROVIDER NOTIFICATION
At 2120 pt began dry-heaving, c/o severe 9/10 abdominal pain and nausea. Paused tube feeding. PRN oxycodone and tylenol administered. Notified Dr. Saige Roe who came and assessed patient.     Plan:   Abdominal x-ray  Robaxin for pain  Continue to monitor

## 2019-03-22 NOTE — PROGRESS NOTES
ADVANCED HEART FAILURE PROGRESS NOTE    SUBJECTIVE:  Had sputum culture positive overnight so started on Zosyn.     ROS otherwise negative.    OBJECTIVE:  Vital signs:  Temp: 97.9  F (36.6  C) Temp  Min: 96.7  F (35.9  C)  Max: 98.5  F (36.9  C)  Heart Rate: 88 Heart Rate  Min: 81  Max: 88  BP: 120/80 Systolic (24hrs), Av , Min:103 , Max:132   Diastolic (24hrs), Av, Min:66, Max:83    Resp: 20 Resp  Min: 10  Max: 20  SpO2: 100 % SpO2  Min: 98 %  Max: 100 %      Intake/Output Summary (Last 24 hours) at 3/22/2019 0641  Last data filed at 3/22/2019 0524  Gross per 24 hour   Intake 1346 ml   Output 525 ml   Net 821 ml       Vitals:    19 0900 19 0500 19 1631   Weight: 56.1 kg (123 lb 10.9 oz) 56.5 kg (124 lb 9.6 oz) 56.5 kg (124 lb 9 oz)       Physical Exam:  GEN:  no distress, lying in bed  CV:  Regular rate and rhythm, no murmur.   CHEST: Subclavian incisions sutured.  Chest tubes. Dressed right chest hematoma site.  LUNGS:  normal work of breathing, Clear to auscultation bilaterally, no wheezes or crackles.   ABD:  hyperactive bowel sounds, soft, mild RLQ tenderness, nondistended  EXT:  No edema or cyanosis. Normal distal puses  NEURO: no focal deficits      Medications:    IV fluid REPLACEMENT ONLY       - MEDICATION INSTRUCTIONS -       BETA BLOCKER NOT PRESCRIBED         Current Facility-Administered Medications   Medication Dose Route Frequency     B and C vitamin Complex with folic acid  5 mL Per Feeding Tube Daily     DULoxetine  20 mg Oral Daily     fiber modular (NUTRISOURCE FIBER)  1 packet Per Feeding Tube Q6H     fluticasone  2 puff Inhalation Q12H     heparin lock flush  5-15 mL Intracatheter Q24H     insulin aspart  1-6 Units Subcutaneous Q4H     levothyroxine  100 mcg Oral Daily     lidocaine  1 patch Transdermal Q24H     lidocaine   Transdermal Q8H     lidocaine   Transdermal Q24h     melatonin  3 mg Oral At Bedtime     mesalamine  2,400 mg Oral BID     montelukast  10 mg Oral  At Bedtime     mycophenolate  1,000 mg Oral BID IS     nystatin  500,000 Units Oral 4x Daily     piperacillin-tazobactam  2.25 g Intravenous Q6H     predniSONE  10 mg Oral QPM     predniSONE  15 mg Oral Daily     ranitidine  150 mg Oral Daily     rosuvastatin  10 mg Oral Daily     senna-docusate  1 tablet Oral BID    Or     senna-docusate  2 tablet Oral BID     sildenafil  20 mg Oral TID     sodium chloride (PF)  10 mL Intracatheter Q8H     sodium chloride (PF)  3 mL Intracatheter Q8H     sulfamethoxazole-trimethoprim  1 tablet Oral Once per day on Tue Thu Sat     tacrolimus  1.5 mg Oral QAM     tacrolimus  2 mg Oral QPM         Labs:  CMP  Recent Labs   Lab 03/22/19  0010 03/20/19  0450  03/18/19  0505   * 134   < > 132*   POTASSIUM 4.2 4.0   < > 4.1   CHLORIDE 99 98   < > 100   CO2 20 20   < > 20   BUN 84* 58*   < > 52*   CR 2.66* 2.31*   < > 2.04*   RADAMES 7.8* 7.9*   < > 8.0*   MAG 1.9 2.2   < > 2.1   PHOS 5.3* 4.9*   < > 3.0   * 254*   < > 200*   ALKPHOS  --   --   --  137   BILITOTAL  --   --   --  0.8   AST  --   --   --  36   ALT  --   --   --  57    < > = values in this interval not displayed.     BLOOD GASNo lab results found in last 7 days.  CBC  Recent Labs   Lab 03/22/19  0010 03/21/19  0710   WBC 8.6 10.5   HGB 7.1* 7.3*   HCT 23.3* 23.6*   PLT 82* 80*     COAG  Recent Labs   Lab 03/22/19  0010 03/21/19  0710   INR 1.35* 1.33*         ASSESSMENT/PLAN:  Everton Larios is a 54 yo gentleman who underwent orthotopic heart transplant 2/24/19 for non ischemic cardiomyopathy and cardiogenic shock. Post-op complicated by RV dysfunction and FRANKLIN.      Updates today  - HD line clotted, replaced with new line     #Oliguric renal failure:  Etiology cardiorenal from venous congestion vs. nephrotoxins.  - iHD per renal  - nephrology consulted, appreciate assistance  - will need tunneled line for HD, will need to be placed on right side given left-side goretex graft     #Sepsis  - completed  antibiotics cefepime/zosyn for 7 day course (ending 3/16/19)  - cultures no growth to date     #S/p OHT on 2/24:  Graft function:  - echo 3/1 shows normal LV function, RV is mild to moderately dilated and hypokinetic  - now off dobutamine  - continue sildenafil 20 q8h     Immunosuppression:  - on MMF decreased at 1000 BID  - Prednisone 15mg BID, decreasing by 5mg daily with negative biopsies  - NOT on Simulect protocol  - tacrolimus 1.5/2 mg BID (goal 8-10)     Prophylaxis:  - CMV +/+: medium risk, Valcyte held given thrombocytopenia/anemia, will check weekly CMV titers  - PCP: pentamadine given 3/3, starting Bactrim  - Thrush: improved on nystatin  - GI: on PPI  - CAV: Crestor, holding ASA given bleeding at line sites     Biopsies:  - First biopsy 3/4, 0R, no AMR  - Second biopsy 3/11, 0R, no AMR  - Third biopsy 3/18, 1R, no AMR     Goretex conduit for persistent left SVC:  - will need anticoagulation, plan to start warfarin when stable after hematoma drainage  **NO CENTRAL VENOUS CATHETERS ON THE LEFT UPPER BODY**      #Thrombocytopenia/Anemia:   - holding valcyte     Seen and staffed with .      Trev Harris MD  Advanced Heart Failure Fellow  [phone]    I personally saw and examined this patient, reviewed imaging and laboratory studies, confirmed physical examination and discussed results and plan with patient . I agree with note and plan above by Dr. Harris

## 2019-03-22 NOTE — PROGRESS NOTES
CLINICAL NUTRITION SERVICES     Nutrition Prescription    RECOMMENDATIONS FOR MDs/PROVIDERS TO ORDER:  Would not rec discontinue TFs at this time due to BMI less than 19 and degree of pt's wt loss. Has lost 22% of his body wt over the past five months (see below wt hx). Once pt is consuming at least 1200 kcals and 60 g protein daily, then rec discontinue TFs.    Adjust free water flushes via feeding tube as needed, pending fluid status. Currently, free water flushes are minimal, or for patency, as pt is on dialysis.     Recommendations already ordered by Registered Dietitian (RD):  Decreased TF volume and duration  Kcal counts 3/23-3/25  Discontinued Nutrisource Fiber  Modified renal multivitamin  Modified free water    Future/Additional Recommendations:  For all recommendations, see prior nutrition notes.       TF orders: Peptamen 1.5 at 80 mL/hr x 12 hrs (8p-8a) to provide 960 mL of formula, 1440 kcal (26 kcal/kg), 65 gm protein (1.2 gm/kg), 180 gm CHO, 54 gm fat, 0+ g fiber and 739 mL free water. Ordered to receive Nutrisource Fiber, 1 pkt Q 6 hrs (not consistently being administered per order as Nutrisource Fiber may not be sent regularly). Per RN, TF held starting 3/21 due to abd pain and nausea. Felt full/bloated 3/21 pm. Per MD at 22:03, this does not feel like his UC flares.     Diet: Regular. On a 2 L fluid restriction. Ordered to receive Boost Breeze at 10:00 and 14:00 and cherry Gelatein Plus at 10:00 and HS.   Kcal counts:   3/19   One meal ordered through room service, no food intake recorded   3/20   924 kcals and 48 g protein (100% cookie, 50% mashed potatoes, cottage cheese, 100% 1 Gelatein Plus, and 100% 2 Boost Breeze supplement/s recorded)   3/21   586 kcals and 24 g protein (100% ice cream, 50% omelet w/ ham & cheese, french toast w/ syrup and no supplement/s recorded)   * Pt consumed a two-day average of 755 kcals and 36 g protein daily which does not meet estimated needs are 4233-5559+  kcals/day (30 - 35+ kcals/kg) and  grams protein/day (1.5 - 2 grams of pro/kg).       Wt hx: 84.1 kg (1/20/16), 74.8 kg (4/2/18), 72.5 kg (10/11/18), 64.4 kg (12/19/19), 65 kg (1/30/19), 63.5 kg (2/17/19, admit), 56.5 kg (3/21/19) - Progressive wt loss and suspect some fluid status changes this admission.      INTERVENTIONS:  Implementation:  Collaboration with other providers: Discussed pt with team. In agreement with nutrition interventions.   Enteral Nutrition: Decreased TFs to help encourage oral intake and to potentially help alleviate GI sx. Peptamen 1.5 (semi-elemental, no fiber) from 8p-6a to provide 600 mL TF, 900 kcals (16 kcals/kg), 41 g protein (0.7 g protein/kg), 462 mL H2O, 113 g CHO, and no fiber daily.  Feeding tube flush: Changed free water to 60 mL before and after TF cycle.  Multivitamin/mineral supplement therapy: Modified nephronex to nephrocaps/triphrocaps.  Re-ordered kcal counts      Follow up/Monitoring:  Will continue to follow pt.    Jannie Guerrero, MS, RD, LD, [facility]   6C Pgr: [phone]

## 2019-03-22 NOTE — PLAN OF CARE
D: Heart transplant POD 26    I/A: VSS. Pain in abd rated 6/10 (averaged), treated with PRN tylenol and oxycodone. Patient states pain does not go completely away but he was made comfortable enough to get some sleep. Patient also having a lot of nausea--also rating an average of 6/10. PRN Zofran and Compazine given. TIFFANY drain in place, minimal drainage. Dressing to hematoma clean/dry/intact. Patient on capnography all night with no issues. Sinus rhythm. Labs drawn at midnight per MD request. Tube feeds on hold this shift due to pain and nausea.     P: Encouraged patient to maintain positive attitude.  Keep MD team up to date on patient condition. Continue to monitor.

## 2019-03-22 NOTE — PROCEDURES
Hemodialysis Catheter Placement Procedure Note  DATE OF OPERATION: 3/22/2019  PREOPERATIVE DIAGNOSIS: Acute renal failure  POSTOPERATIVE DIAGNOSIS: Acute renal failure  OPERATION PERFORMED: Hemodialysis Catheter Placement  RESIDENT: Leyla Bowling MD PGY-3.  FELLOW: Fely Nice MD   ATTENDING: Lola Rodriguez MD   ANESTHESIA: Local  EBL: 10 mL  COMPLICATIONS: None  INDICATIONS FOR PROCEDURE:   The patient is a 53 year old male with hx chronic renal insufficiency and NICM c/b cardiogenic shock s/p OHT 2/24, complicated by RV dysfunction and acute oliguric kidney injury. His RIJ hemodialysis line clotted off today and he requires placement of a new line for dialysis access.  DESCRIPTION OF OPERATIVE PROCEDURE:   Pre-procedure consent for the procedure was obtained. A time out was performed. The patient was placed in Trendelenburg position. The prior RIJ HD line was removed and clot was seen at the tip of the catheter. The right neck region was prepped and draped in sterile fashion. Anesthesia was achieved with lidocaine. Using ultrasound guidance, the introducer needle was inserted over the internal jugular vein. Venous blood was aspirated. The syringe was removed and a guidewire was advanced through the introducer needle which was then withdrawn. A small incision was made at the skin surface with a scalpel and both dilators were serially advanced over the guidewire. After appropriate dilation was obtained, the dilator was then removed and a 20 cm mahurkar hemodialysis line was advanced over the wire, which was then removed. All ports easily flushed and aspirated. The catheter was sutured in place at 16 cm at the skin and a sterile dressing was applied. The patient tolerated the procedure without any hemodynamic compromise. Dr Rodriguez is the supervising surgeon and was available for assistance.  DISPOSITION: stable, post-procedure x-ray is pending at this time  Leyla Bowling MD  General Surgery  PGY-3

## 2019-03-22 NOTE — PROGRESS NOTES
"[facility] Cover Note:    Was paged by nurse that patient is having abdominal pain nor relieved by available pain meds. Went to assess patient. He reports that a similar pain happened last night. It is diffuse and feels sharp. He endorses dry heaves but no emesis. States that this makes his abdominal wall muscles tense and sore. Does feel bloated/full. Tube feeds are being cycled overnight, held by RN due to pain. Passing flatus. Last BM yesterday morning. This does not feel like his UC flares. HDS.    O:  /80 (BP Location: Left arm)   Pulse 86   Temp 98.1  F (36.7  C) (Oral)   Resp 15   Ht 1.727 m (5' 8\")   Wt 56.5 kg (124 lb 9 oz)   SpO2 99%   BMI 18.94 kg/m      Alert and oriented  Unlabored breathing on RA  Abdomen is soft, non-distended, diffusely tender to palpation, + voluntary guarding, no rebound tenderness  No LE edema     A/P: HDS, no peritoneal signs to suggest acute abdomen. Possibly 2/2 to tube feeds intolerance or ileus.    -Abdominal xray ordered  -Robaxin for abdominal muscle pain  -Continue to hold tube feeds overnight      Saige Roe, PGY1  "

## 2019-03-22 NOTE — PLAN OF CARE
OT6C:   Discharge Planner OT   Patient plan for discharge: rehab  Current status: Pt completed seated EOB with set up assistance. Attempted to stand x3, however pt declining to continue to attempt to stand. Tolerated 10 minutes of exercise using LE ergometer at min resistance needing x1 minute rest break. VSS on RA.   Barriers to return to prior living situation: medical status, pain, decreased strength and activity tolerance, precautions   Recommendations for discharge: ARU  Rationale for recommendations: Pt would benefit from continued therapy to progress safety and IND with I/ADLs and functional mobility needed for home and community distances.       Entered by: Carla Hinton 03/22/2019 10:44 AM

## 2019-03-22 NOTE — PROGRESS NOTES
S: [facility] asked to assess coccyx for possible pressure injury.    B: history per MD notes: Everton Larios is a 53 year old male with PMH etoh abuse, hypothyroidism, intermittent asthma, ulcerative colitis, Depression, Chronic HFrEF, NICM admitted with cardiogenic shock requiring subclavian balloon pump.     A: Coccyx, sacrum and buttocks assessed.  Skin is very quickly blanchable.  Sacral mepilex in place for prevention.  No pressure injury at this time.   Noted NJ tube taped to face with bridle clip resting on nare.  Removed tegaderm and assessed left nare to find no pressure injury.  Discussed with nurse if tape is needed to keep tube to side, please make sure to avoid bridle clip against nare.       R:   Right nare: make sure no tension to tube if feeding tube taped to face.  If bridle against nare please contact RD to move bridle.      Sacrum: Every 5 days or as needed apply sacral mepilex for prevention.       No further follow up planned    Halima Barrientos BSN RN CWOCN

## 2019-03-22 NOTE — PROGRESS NOTES
CVTS Daily Note  3/22/2019  Attending: Yves Rodrigues MD    S:   Patient went for dialysis yesterday afternoon. They were unable to run him due to his right internal jugular catheter no longer being functional. Patient will need a new right internal jugular dialysis catheter to be placed by Cardiology or CVTS today, cardiology aware. Nephrology unable to place today.     Pt seen up in chair doing therapies.   Does continue to complain of some SOB with activity and decreased exercise tolerance.    Pain controlled on pain regimen. No Bm for 48 hours and getting uncomfortable. Also reports heart burn and epigastric tenderness. Nausea yesterday evening. Tube feeding held. Patent has a history of GERD and Pierce Esophagus.     Denies F/C/Sweats.  No CP or calf pain.      Ambulates better with assistance.    Pain level tolerable. Plan as per Neuro section below.     O:   Vitals:    03/22/19 0807 03/22/19 0823 03/22/19 0844 03/22/19 1131   BP: 99/68   101/71   BP Location: Left arm   Left arm   Pulse:       Resp: 20   20   Temp: 97.8  F (36.6  C)   98  F (36.7  C)   TempSrc: Oral   Oral   SpO2: 100% 100% 97% 99%   Weight:       Height:         Vitals:    03/20/19 0900 03/21/19 0500 03/21/19 1631   Weight: 56.1 kg (123 lb 10.9 oz) 56.5 kg (124 lb 9.6 oz) 56.5 kg (124 lb 9 oz)       Intake/Output Summary (Last 24 hours) at 3/20/2019 0759  Last data filed at 3/20/2019 0600  Gross per 24 hour   Intake 855 ml   Output 2500 ml   Net -1645 ml         Gen: AAO x 3, pleasant, NAD  CV: RRR, S1S2 normal, no murmurs, rubs, or gallops.   Pulm: CTA, no rhonchi or wheezes  Abd: none distended, soft, non-tender, no guarding.  Ext: no peripheral edema  Incision: clean, dry, intact, no erythema  Chest Tube sites: dressings clean and dry      Labs:  BMP  Recent Labs   Lab 03/22/19  0728 03/22/19  0010 03/20/19  0450 03/19/19  0400   * 130* 134 132*   POTASSIUM 4.4 4.2 4.0 4.8   CHLORIDE 98 99 98 99   RADAMES 8.2* 7.8* 7.9* 8.2*   CO2  19* 20 20 20   BUN 89* 84* 58* 67*   CR 2.82* 2.66* 2.31* 2.39*   * 135* 254* 97     CBC  Recent Labs   Lab 03/22/19  0728 03/22/19  0010 03/21/19  0710 03/20/19  0450   WBC 7.5 8.6 10.5 10.2   RBC 2.28* 2.36* 2.39* 2.51*   HGB 7.0* 7.1* 7.3* 7.7*   HCT 22.3* 23.3* 23.6* 24.8*   MCV 98 99 99 99   MCH 30.7 30.1 30.5 30.7   MCHC 31.4* 30.5* 30.9* 31.0*   RDW 18.9* 19.0* 18.6* 19.1*   PLT 88* 82* 80* 78*     INR  Recent Labs   Lab 03/22/19  0728 03/22/19  0010 03/21/19  0710 03/20/19  0450   INR 1.37* 1.35* 1.33* 1.27*      Hepatic Panel   Lab Results   Component Value Date    AST 36 03/18/2019     Lab Results   Component Value Date    ALT 57 03/18/2019     Lab Results   Component Value Date    ALBUMIN 2.6 03/18/2019     GLUCOSE:   Recent Labs   Lab 03/22/19  0728 03/22/19  0012 03/22/19  0010 03/21/19  2219 03/21/19  1853 03/21/19  1626 03/21/19  1112 03/21/19  0629  03/20/19  0450  03/19/19  0400  03/18/19  0505  03/17/19  0455   *  --  135*  --   --   --   --   --   --  254*  --  97  --  200*  --  221*   BGM  --  141*  --  199* 178* 164* 142* 118*   < >  --    < >  --    < >  --    < > 212*    < > = values in this interval not displayed.     Culture Micro (Abnormal) 03/20/2019  4:30    Abnormal   Light growth   Klebsiella oxytoca   Susceptibility testing in progress          Imaging:    3/21/19  7:55 PM [id number] [facility], [place],  Radiology    PACS Images      Show images for XR Chest 2 Views   Study Result     XR CHEST 2 VW  3/21/2019 7:55 PM       HISTORY: pneumonia, positive sputum culture.     COMPARISON: CT of the chest 3/19/2019, chest x-ray 3/15/2019     FINDINGS:   PA and lateral views of the chest.     Sternotomy wires unfractured. Right IJ with tip projecting over the  cavoatrial junction. Enteric tube courses outside the field-of-view.     Trachea is midline. Cardiomediastinal silhouette is well-defined. The  pulmonary vasculature is indistinct and there is a new right  basilar  interstitial consolidation. Left basilar presumed atelectasis is  stable. There are small bilateral pleural effusions which are new..                                                                      IMPRESSION:   Interval development of bilateral interstitial processes, right  greater than left. This could represent asymmetrical pulmonary edema  but infections are of concern.     I have personally reviewed the examination and initial interpretation  and I agree with the findings.     RADHA ALY MD       A/P:  Everton Larios is a 53 year old male with PMH etoh abuse, hypothyroidism, intermittent asthma, ulcerative colitis, Depression, Chronic HFrEF, NICM admitted with cardiogenic shock requiring subclavian balloon pump. He is now s/p OHT 2/24/19 with Piyush House and Christopher.    - RHC and biopsy 3/4; 0R, no AMR  - RHC and biopsy 3/11; 0R, no AMR  - RHC and biopsy 3/19; results pending     Neuro:   - Neuro intact. ICU delirium improving. Head CT 3/9 without acute pathology.   - Hx Depression: pta cymbalta  - Sleep: melatonin   - Tylenol and oxycodone  -  tramadol      CV:   - Hx of NICM, now S/P heart transplant 2/24  - No arrhythmia, HD stable.  - ASA 81 mg, Crestor  - Dobutamine 2.5 mcg/kg/min for mild decreased RV function-now off. Sildenafil 20 mg TID.      Pulm:   - Hx intermittent asthma, pta Singulair.   - Pulm toilet, IS, activity and deep breathing   - Supplemental O2 PRN to keep sats > 92%. Wean off as tolerated.  - LLL consolidation, albuterol and mucomyst Nebs today x 2, assess response.   - Sputum culture showing lactose fermenting gram neg rods, now showing strain of Klebsiella- zosyn started 3/21, awaiting sensitivities.        ID:   - WBC 7.5, afebrile, no signs or symptoms of infection other than SOB. Completed 7 days cefepime/zosyn 3/16.   - Blood cultures 3/13: NGTD. C diff negative 3/17.    - Sputum culture positive 3/20, zosyn started 3/21, await sensitivities.      GI /  FEN:  - Reg diet, bowel regimen. Calorie counts. Na 132.   - Tube feeds were off 3/21 for nausea. New formula 3/22 pm.   - Hx ulcerative colitis; pta mesalamine. Stool occult negative 3/20.  - Needs to have BM, give lactulose today if no results.    - Increase PPI to bid for GERD     Renal / :   - Creatinine 2.82, adequate UOP  - Nephrology following for oliguria and T/Th/Sa hemodialysis. Unable to get tunneled dialysis catheter due to lena drain to old right pacer pocket. RIJ dialysis catheter no longer functional and dialysis unable to be performed 3/21. Patient is showing signs of renal recovery but still not enough to go completely without dialysis. Will need another RIJ dialysis catheter today. Cardiology or CVTS to place, will arrange.   - Give another 4 mg IV Bumex today.      Heme / Anticoagulation:  - Hgb stable 7's, Plts 88.   - Will need coumadin eventually for SVC graft per Dr House. Will need to discuss with Cardiology, may prefer Apixaban due to frequent biopsies.    - No biopsies or central line access on left due to SVC graft.   - Holding Hep gtt since 3/18. Has R chest hematoma shouldn't interfere with RIGHT chest HD tunneled catheter placement. Chest CT 3/19 confirms R chest hematoma at old AICD site, s/p drainage in OR 3/21.   Endo:   - Hypothyroid: levothyroxine 100 mcg daily   - Sliding corrective scale q 4 hrs 3/20, consider 5 units NPH with tube feeds depending on total insulin needed today.      PPX:   - Protonix     Dispo:   - 6C since 3/16  - Needs placement of RIJ dialysis catheter.     Discussed with CVTS Fellow   Staff surgeons have been informed of changes through both  verbal and written communication.      Sheila Ann PA-C  Cardiothoracic Surgery  Pager [phone]  March 22, 2019

## 2019-03-23 LAB
ANION GAP SERPL CALCULATED.3IONS-SCNC: 18 MMOL/L (ref 3–14)
BUN SERPL-MCNC: 98 MG/DL (ref 7–30)
CALCIUM SERPL-MCNC: 8.4 MG/DL (ref 8.5–10.1)
CHLORIDE SERPL-SCNC: 96 MMOL/L (ref 94–109)
CO2 SERPL-SCNC: 17 MMOL/L (ref 20–32)
CREAT SERPL-MCNC: 3 MG/DL (ref 0.66–1.25)
ERYTHROCYTE [DISTWIDTH] IN BLOOD BY AUTOMATED COUNT: 19.2 % (ref 10–15)
GFR SERPL CREATININE-BSD FRML MDRD: 22 ML/MIN/{1.73_M2}
GLUCOSE BLDC GLUCOMTR-MCNC: 103 MG/DL (ref 70–99)
GLUCOSE BLDC GLUCOMTR-MCNC: 104 MG/DL (ref 70–99)
GLUCOSE BLDC GLUCOMTR-MCNC: 124 MG/DL (ref 70–99)
GLUCOSE BLDC GLUCOMTR-MCNC: 149 MG/DL (ref 70–99)
GLUCOSE BLDC GLUCOMTR-MCNC: 192 MG/DL (ref 70–99)
GLUCOSE BLDC GLUCOMTR-MCNC: 85 MG/DL (ref 70–99)
GLUCOSE SERPL-MCNC: 100 MG/DL (ref 70–99)
HCT VFR BLD AUTO: 24.7 % (ref 40–53)
HGB BLD-MCNC: 7.7 G/DL (ref 13.3–17.7)
INR PPP: 1.35 (ref 0.86–1.14)
MAGNESIUM SERPL-MCNC: 2.5 MG/DL (ref 1.6–2.3)
MCH RBC QN AUTO: 30.7 PG (ref 26.5–33)
MCHC RBC AUTO-ENTMCNC: 31.2 G/DL (ref 31.5–36.5)
MCV RBC AUTO: 98 FL (ref 78–100)
PHOSPHATE SERPL-MCNC: 6.8 MG/DL (ref 2.5–4.5)
PLATELET # BLD AUTO: 114 10E9/L (ref 150–450)
POTASSIUM SERPL-SCNC: 5 MMOL/L (ref 3.4–5.3)
RBC # BLD AUTO: 2.51 10E12/L (ref 4.4–5.9)
SODIUM SERPL-SCNC: 131 MMOL/L (ref 133–144)
TACROLIMUS BLD-MCNC: 8.4 UG/L (ref 5–15)
TME LAST DOSE: NORMAL H
WBC # BLD AUTO: 7.6 10E9/L (ref 4–11)

## 2019-03-23 PROCEDURE — 25000132 ZZH RX MED GY IP 250 OP 250 PS 637: Performed by: PHYSICIAN ASSISTANT

## 2019-03-23 PROCEDURE — 80197 ASSAY OF TACROLIMUS: CPT | Performed by: THORACIC SURGERY (CARDIOTHORACIC VASCULAR SURGERY)

## 2019-03-23 PROCEDURE — 84100 ASSAY OF PHOSPHORUS: CPT | Performed by: THORACIC SURGERY (CARDIOTHORACIC VASCULAR SURGERY)

## 2019-03-23 PROCEDURE — 25000132 ZZH RX MED GY IP 250 OP 250 PS 637: Performed by: NURSE PRACTITIONER

## 2019-03-23 PROCEDURE — 25000128 H RX IP 250 OP 636: Performed by: PHYSICIAN ASSISTANT

## 2019-03-23 PROCEDURE — 90937 HEMODIALYSIS REPEATED EVAL: CPT

## 2019-03-23 PROCEDURE — 25000128 H RX IP 250 OP 636: Performed by: INTERNAL MEDICINE

## 2019-03-23 PROCEDURE — 36592 COLLECT BLOOD FROM PICC: CPT | Performed by: THORACIC SURGERY (CARDIOTHORACIC VASCULAR SURGERY)

## 2019-03-23 PROCEDURE — 25000132 ZZH RX MED GY IP 250 OP 250 PS 637: Performed by: SURGERY

## 2019-03-23 PROCEDURE — 21400000 ZZH R&B CCU UMMC

## 2019-03-23 PROCEDURE — 25000132 ZZH RX MED GY IP 250 OP 250 PS 637: Performed by: INTERNAL MEDICINE

## 2019-03-23 PROCEDURE — 25000132 ZZH RX MED GY IP 250 OP 250 PS 637: Performed by: STUDENT IN AN ORGANIZED HEALTH CARE EDUCATION/TRAINING PROGRAM

## 2019-03-23 PROCEDURE — 00000146 ZZHCL STATISTIC GLUCOSE BY METER IP

## 2019-03-23 PROCEDURE — 83735 ASSAY OF MAGNESIUM: CPT | Performed by: THORACIC SURGERY (CARDIOTHORACIC VASCULAR SURGERY)

## 2019-03-23 PROCEDURE — 80048 BASIC METABOLIC PNL TOTAL CA: CPT | Performed by: THORACIC SURGERY (CARDIOTHORACIC VASCULAR SURGERY)

## 2019-03-23 PROCEDURE — 25000131 ZZH RX MED GY IP 250 OP 636 PS 637: Performed by: INTERNAL MEDICINE

## 2019-03-23 PROCEDURE — 85027 COMPLETE CBC AUTOMATED: CPT | Performed by: THORACIC SURGERY (CARDIOTHORACIC VASCULAR SURGERY)

## 2019-03-23 PROCEDURE — 25000125 ZZHC RX 250: Performed by: INTERNAL MEDICINE

## 2019-03-23 PROCEDURE — 99232 SBSQ HOSP IP/OBS MODERATE 35: CPT | Mod: GC | Performed by: INTERNAL MEDICINE

## 2019-03-23 PROCEDURE — 27210437 ZZH NUTRITION PRODUCT SEMIELEM INTERMED LITER

## 2019-03-23 PROCEDURE — 85610 PROTHROMBIN TIME: CPT | Performed by: THORACIC SURGERY (CARDIOTHORACIC VASCULAR SURGERY)

## 2019-03-23 RX ORDER — SULFAMETHOXAZOLE AND TRIMETHOPRIM 400; 80 MG/1; MG/1
1 TABLET ORAL
Status: DISCONTINUED | OUTPATIENT
Start: 2019-03-23 | End: 2019-04-01

## 2019-03-23 RX ORDER — SULFAMETHOXAZOLE/TRIMETHOPRIM 800-160 MG
1 TABLET ORAL EVERY EVENING
Status: DISCONTINUED | OUTPATIENT
Start: 2019-03-23 | End: 2019-03-25

## 2019-03-23 RX ORDER — CALCIUM CARBONATE 500 MG/1
500 TABLET, CHEWABLE ORAL DAILY PRN
Status: DISCONTINUED | OUTPATIENT
Start: 2019-03-23 | End: 2019-04-03 | Stop reason: HOSPADM

## 2019-03-23 RX ADMIN — Medication 1 CAPSULE: at 09:30

## 2019-03-23 RX ADMIN — SENNOSIDES AND DOCUSATE SODIUM 1 TABLET: 8.6; 5 TABLET ORAL at 09:29

## 2019-03-23 RX ADMIN — MYCOPHENOLATE MOFETIL 1000 MG: 500 TABLET, FILM COATED ORAL at 17:42

## 2019-03-23 RX ADMIN — ROSUVASTATIN CALCIUM 10 MG: 10 TABLET, FILM COATED ORAL at 09:29

## 2019-03-23 RX ADMIN — SILDENAFIL 20 MG: 20 TABLET ORAL at 09:32

## 2019-03-23 RX ADMIN — CALCIUM CARBONATE (ANTACID) CHEW TAB 500 MG 500 MG: 500 CHEW TAB at 09:30

## 2019-03-23 RX ADMIN — PIPERACILLIN AND TAZOBACTAM 2.25 G: 2; .25 INJECTION, POWDER, FOR SOLUTION INTRAVENOUS at 09:32

## 2019-03-23 RX ADMIN — MESALAMINE 2400 MG: 800 TABLET, DELAYED RELEASE ORAL at 09:32

## 2019-03-23 RX ADMIN — Medication 5 ML: at 06:02

## 2019-03-23 RX ADMIN — MELATONIN TAB 3 MG 3 MG: 3 TAB at 20:48

## 2019-03-23 RX ADMIN — ACETAMINOPHEN 650 MG: 325 TABLET, FILM COATED ORAL at 02:04

## 2019-03-23 RX ADMIN — ACETAMINOPHEN 650 MG: 325 TABLET, FILM COATED ORAL at 10:58

## 2019-03-23 RX ADMIN — PIPERACILLIN AND TAZOBACTAM 2.25 G: 2; .25 INJECTION, POWDER, FOR SOLUTION INTRAVENOUS at 02:04

## 2019-03-23 RX ADMIN — Medication: at 14:08

## 2019-03-23 RX ADMIN — Medication 2.5 MG: at 17:43

## 2019-03-23 RX ADMIN — Medication 2.5 MG: at 02:04

## 2019-03-23 RX ADMIN — SILDENAFIL 20 MG: 20 TABLET ORAL at 21:27

## 2019-03-23 RX ADMIN — TACROLIMUS 1.5 MG: 1 CAPSULE ORAL at 09:29

## 2019-03-23 RX ADMIN — MYCOPHENOLATE MOFETIL 1000 MG: 500 TABLET, FILM COATED ORAL at 09:29

## 2019-03-23 RX ADMIN — TACROLIMUS 2 MG: 1 CAPSULE ORAL at 17:43

## 2019-03-23 RX ADMIN — DULOXETINE 20 MG: 20 CAPSULE, DELAYED RELEASE ORAL at 09:30

## 2019-03-23 RX ADMIN — SULFAMETHOXAZOLE AND TRIMETHOPRIM 1 TABLET: 800; 160 TABLET ORAL at 20:48

## 2019-03-23 RX ADMIN — SENNOSIDES AND DOCUSATE SODIUM 2 TABLET: 8.6; 5 TABLET ORAL at 20:47

## 2019-03-23 RX ADMIN — FLUTICASONE PROPIONATE 2 PUFF: 220 AEROSOL, METERED RESPIRATORY (INHALATION) at 09:44

## 2019-03-23 RX ADMIN — RANITIDINE 150 MG: 150 TABLET ORAL at 09:30

## 2019-03-23 RX ADMIN — MESALAMINE 2400 MG: 800 TABLET, DELAYED RELEASE ORAL at 20:48

## 2019-03-23 RX ADMIN — PANTOPRAZOLE SODIUM 40 MG: 40 TABLET, DELAYED RELEASE ORAL at 20:48

## 2019-03-23 RX ADMIN — SODIUM CHLORIDE 250 ML: 9 INJECTION, SOLUTION INTRAVENOUS at 14:08

## 2019-03-23 RX ADMIN — SILDENAFIL 20 MG: 20 TABLET ORAL at 17:43

## 2019-03-23 RX ADMIN — Medication 500000 UNITS: at 18:21

## 2019-03-23 RX ADMIN — SODIUM CHLORIDE 300 ML: 9 INJECTION, SOLUTION INTRAVENOUS at 14:07

## 2019-03-23 RX ADMIN — PREDNISONE 15 MG: 10 TABLET ORAL at 09:30

## 2019-03-23 RX ADMIN — Medication 2.5 MG: at 06:30

## 2019-03-23 RX ADMIN — Medication 500000 UNITS: at 20:47

## 2019-03-23 RX ADMIN — FLUTICASONE PROPIONATE 2 PUFF: 220 AEROSOL, METERED RESPIRATORY (INHALATION) at 20:48

## 2019-03-23 RX ADMIN — LEVOTHYROXINE SODIUM 100 MCG: 100 TABLET ORAL at 09:30

## 2019-03-23 RX ADMIN — ACETAMINOPHEN 650 MG: 325 TABLET, FILM COATED ORAL at 17:43

## 2019-03-23 RX ADMIN — MONTELUKAST SODIUM 10 MG: 10 TABLET, COATED ORAL at 21:27

## 2019-03-23 RX ADMIN — Medication 5 ML: at 20:47

## 2019-03-23 RX ADMIN — Medication 500000 UNITS: at 09:31

## 2019-03-23 RX ADMIN — Medication 2.5 MG: at 10:58

## 2019-03-23 RX ADMIN — ACETAMINOPHEN 650 MG: 325 TABLET, FILM COATED ORAL at 06:30

## 2019-03-23 RX ADMIN — PREDNISONE 10 MG: 10 TABLET ORAL at 20:48

## 2019-03-23 RX ADMIN — PANTOPRAZOLE SODIUM 40 MG: 40 TABLET, DELAYED RELEASE ORAL at 09:30

## 2019-03-23 ASSESSMENT — ACTIVITIES OF DAILY LIVING (ADL)
ADLS_ACUITY_SCORE: 13
ADLS_ACUITY_SCORE: 14
ADLS_ACUITY_SCORE: 13
ADLS_ACUITY_SCORE: 14
ADLS_ACUITY_SCORE: 14
ADLS_ACUITY_SCORE: 13

## 2019-03-23 ASSESSMENT — PAIN DESCRIPTION - DESCRIPTORS
DESCRIPTORS: DISCOMFORT
DESCRIPTORS: DISCOMFORT

## 2019-03-23 ASSESSMENT — MIFFLIN-ST. JEOR
SCORE: 1366.52
SCORE: 1372.41

## 2019-03-23 NOTE — PLAN OF CARE
D: Pt admitted 2/17 with cardiogenic shock, s/p OHT 2/24. PMH: ETOH, hypothyroidism, asthma, ulcerative colitis, depression, NICM.     I/A: A&Ox4. Vital signs stable on RA. Monitor shows sinus rhythm, HR 80's-90's. C/o abdominal pain and discomfort. Managed with PRN Tylenol, oxycodone, and robaxin. Pt refusing tube feeding overnight, attributes TF to abdominal pain. Pt encouraged to eat. Voiding with adequate output. +BM. Minimal output from TIFFANY drain overnight. Continues on regular diet with 2L FR. Poor appetite. Up with assist x2. Appeared to sleep well between cares, making needs known.      P: Continue to monitor. Notify CVTS with changes/concerns.

## 2019-03-23 NOTE — PROGRESS NOTES
ADVANCED HEART FAILURE PROGRESS NOTE    I personally saw and examined this patient, reviewed imaging and laboratory studies, confirmed physical examination and discussed results and plan with patient and or family. Ebony    SUBJECTIVE:  Overnight continues to have intermittent abdominal pain and cramping. Has had 2 bowel movements with some improvement. He feels symptoms are related to tube feeds and refused these over night.     ROS otherwise negative.    OBJECTIVE:  Vital signs:  Temp: 97  F (36.1  C) Temp  Min: 97  F (36.1  C)  Max: 98  F (36.7  C)  Heart Rate: 83 Heart Rate  Min: 80  Max: 88  BP: 116/87 Systolic (24hrs), Av , Min:99 , Max:132   Diastolic (24hrs), Av, Min:68, Max:90    Resp: 18 Resp  Min: 18  Max: 20  SpO2: 99 % SpO2  Min: 97 %  Max: 100 %      Intake/Output Summary (Last 24 hours) at 3/23/2019 0713  Last data filed at 3/23/2019 0602  Gross per 24 hour   Intake 500 ml   Output 1025 ml   Net -525 ml       Vitals:    19 0900 19 0500 19 1631   Weight: 56.1 kg (123 lb 10.9 oz) 56.5 kg (124 lb 9.6 oz) 56.5 kg (124 lb 9 oz)       Physical Exam:  GEN:  no distress, lying in bed  CV:  Regular rate and rhythm, no murmur.   CHEST: Subclavian incisions sutured.  Chest tubes. Dressed right chest hematoma site.  LUNGS:  normal work of breathing, Clear to auscultation bilaterally, no wheezes or crackles.   ABD:  hyperactive bowel sounds, soft, mild RLQ tenderness, nondistended  EXT:  No edema or cyanosis. Normal distal puses  NEURO: no focal deficits      Medications:    IV fluid REPLACEMENT ONLY       - MEDICATION INSTRUCTIONS -       BETA BLOCKER NOT PRESCRIBED         Current Facility-Administered Medications   Medication Dose Route Frequency     sodium chloride 0.9%  250 mL Intravenous Once in dialysis     sodium chloride 0.9%  300 mL Hemodialysis Machine Once     DULoxetine  20 mg Oral Daily     fluticasone  2 puff Inhalation Q12H     gelatin absorbable  1 each Topical During  Hemodialysis (from stock)     heparin lock flush  5-15 mL Intracatheter Q24H     insulin aspart  1-6 Units Subcutaneous Q4H     levothyroxine  100 mcg Oral Daily     lidocaine  1 patch Transdermal Q24H     lidocaine   Transdermal Q8H     lidocaine   Transdermal Q24h     LORazepam  0.5 mg Oral Once     melatonin  3 mg Oral At Bedtime     mesalamine  2,400 mg Oral BID     montelukast  10 mg Oral At Bedtime     multivitamin RENAL  1 capsule Oral Daily     mycophenolate  1,000 mg Oral BID IS     - MEDICATION INSTRUCTIONS -   Does not apply Once     nystatin  500,000 Units Oral 4x Daily     pantoprazole  40 mg Oral BID     piperacillin-tazobactam  2.25 g Intravenous Q6H     predniSONE  10 mg Oral QPM     predniSONE  15 mg Oral Daily     ranitidine  150 mg Oral Daily     rosuvastatin  10 mg Oral Daily     senna-docusate  1 tablet Oral BID    Or     senna-docusate  2 tablet Oral BID     sildenafil  20 mg Oral TID     sodium chloride (PF)  10 mL Intracatheter Q8H     sodium chloride (PF)  3 mL Intracatheter Q8H     sodium chloride (PF)  9 mL Intracatheter During Hemodialysis (from stock)     sodium chloride (PF)  9 mL Intracatheter During Hemodialysis (from stock)     sulfamethoxazole-trimethoprim  1 tablet Oral Once per day on Tue Thu Sat     tacrolimus  1.5 mg Oral QAM     tacrolimus  2 mg Oral QPM         Labs:  CMP  Recent Labs   Lab 03/23/19  0609 03/22/19  0728  03/18/19  0505   * 132*   < > 132*   POTASSIUM 5.0 4.4   < > 4.1   CHLORIDE 96 98   < > 100   CO2 17* 19*   < > 20   BUN 98* 89*   < > 52*   CR 3.00* 2.82*   < > 2.04*   RADAMES 8.4* 8.2*   < > 8.0*   MAG 2.5* 2.0   < > 2.1   PHOS 6.8* 5.5*   < > 3.0   * 107*   < > 200*   ALKPHOS  --   --   --  137   BILITOTAL  --   --   --  0.8   AST  --   --   --  36   ALT  --   --   --  57    < > = values in this interval not displayed.     BLOOD GASNo lab results found in last 7 days.  CBC  Recent Labs   Lab 03/23/19 0609 03/22/19 0728   WBC 7.6 7.5   HGB 7.7*  7.0*   HCT 24.7* 22.3*   * 88*     COAG  Recent Labs   Lab 03/23/19  0609 03/22/19  0728   INR 1.35* 1.37*         ASSESSMENT/PLAN:  Everton Larios is a 54 yo gentleman who underwent orthotopic heart transplant 2/24/19 for non ischemic cardiomyopathy and cardiogenic shock. Post-op complicated by RV dysfunction and FRANKLIN.      Updates today  - plan for HD today  - continue current tacro dose, level at goal     #Oliguric renal failure:  Etiology cardiorenal from venous congestion vs. nephrotoxins.  - iHD per renal  - nephrology consulted, appreciate assistance  - no tunneled line in place for now, would need to be placed on the right side, will continue monitoring for renal recovery    #ID  - completed antibiotics cefepime/zosyn for 7 day course (ending 3/16/19)  - sputum cultures growing Klebsiella  - on Zosyn since 3/22  - will consult transplant ID for antibiotic recommendations     #S/p OHT on 2/24:  Graft function:  - echo 3/1 shows normal LV function, RV is mild to moderately dilated and hypokinetic  - now off dobutamine  - continue sildenafil 20 q8h     Immunosuppression:  - on MMF decreased at 1000 BID  - Prednisone 15mg BID, decreasing by 5mg daily with negative biopsies  - NOT on Simulect protocol  - tacrolimus 1.5/2 mg BID (goal 8-10)     Prophylaxis:  - CMV +/+: medium risk, Valcyte held given thrombocytopenia/anemia, will check weekly CMV titers  - PCP: pentamadine given 3/3, starting Bactrim  - Thrush: improved on nystatin  - GI: on PPI  - CAV: Crestor, holding ASA given bleeding at line sites     Biopsies:  - First biopsy 3/4, 0R, no AMR  - Second biopsy 3/11, 0R, no AMR  - Third biopsy 3/18, 1R, no AMR     Goretex conduit for persistent left SVC:  - will need anticoagulation, plan to start warfarin when stable after hematoma drainage  **NO CENTRAL VENOUS CATHETERS ON THE LEFT UPPER BODY**      #Thrombocytopenia/Anemia:   - holding valcyte     Seen and staffed with .      Trev Harris,  MD  Advanced Heart Failure Fellow  [phone]

## 2019-03-23 NOTE — PLAN OF CARE
D- OHT 2/24  I/A- NSR. RA, SOB noted, saturations remain 98% on RA. Abdominal pain noted but improving, PRN Tylenol and Oxy effective for pain control. Loose BM x2 today. No nausea but still poor appetite, calorie counts in place. BG WNL. Cycled TF at HS, pt encouraged to allow this as he has seen improvement with abd pain and nausea. Dialysis today, see note. PT very weak and deconditioned, pt did some exercises at edge of bed with RN, tolerated well. 2 assist with gait belt and walker getting OOB d/t knees buckling. Up to recliner x1.   P- Continue to monitor and notify team of changes.

## 2019-03-23 NOTE — PLAN OF CARE
D- OHT 2/24  I/A- NSR. RA, SOB noted, saturations remain 98% on RA. Abdominal pain noted, PRN Tylenol and Oxy, and Tramadol given with mild improvement. Multiple PRNs given for constipation with no results. New Dialysis line in place to R internal jugular, plan for dialysis tomorrow. Very poor appetite, calorie counts start at MN. BG WNL.   P- Continue to monitor and notify team of changes.

## 2019-03-23 NOTE — PROGRESS NOTES
HEMODIALYSIS TREATMENT NOTE    Date: 3/23/2019  Time: 4:29 PM    Data:  Pre Wt:   55.1 kg  Desired Wt: 54 kg   Post Wt:  MICHELLE- requested update weight this shift  Ultrafiltration - Post Run Net Total Removed (mL): 1100 mL  Ultrafiltration - Post Run Net Total Gain (mL): 0 mL  Vascular Access Status: Yes, secured and visible  Dialyzer Rinse: Streaked, Light  Total Blood Volume Processed: 54.2  Total Dialysis (Treatment) Time:  3 hrs    Lab:   No    Interventions/Assessment:  54 yo HD 3 hours; 1.1L fluid removed. CVC patent, new dressing applied/old dressing saturated and loose on top edge; . See MAR for medications. Report given to Ami GARCIA; new weight requested this shift.     Plan:    Per renal team.

## 2019-03-23 NOTE — PROGRESS NOTES
CVTS Daily Note  3/23/2019  Attending: Dr House    S:   States that pain is being controlled. Denies any hallucinations.  Breathing is ok. Working with IS.  Appetite remains decreased. No TF last night, +calore count  +BMs (last BM this am), feels he is going to have another BM  Denies any popping/clicking in his breast bone  Working with therapies. Very weak  Saturation from his R sided lena drain.  Was able to get some sleep.  Had is internal jugular catheter switched out on 3/22.    O:   Vitals:    03/22/19 2000 03/22/19 2300 03/23/19 0400 03/23/19 0758   BP: 129/85 132/85 116/87 118/80   BP Location: Left arm Left arm Left arm Left arm   Pulse:       Resp: 18 20 18 18   Temp: 97.9  F (36.6  C) 97.4  F (36.3  C) 97  F (36.1  C) 96.7  F (35.9  C)   TempSrc: Oral Oral Axillary Axillary   SpO2: 99% 100% 99% 100%   Weight:       Height:         Vitals:    03/20/19 0900 03/21/19 0500 03/21/19 1631   Weight: 56.1 kg (123 lb 10.9 oz) 56.5 kg (124 lb 9.6 oz) 56.5 kg (124 lb 9 oz)       Intake/Output Summary (Last 24 hours) at 3/20/2019 0759  Last data filed at 3/20/2019 0600  Gross per 24 hour   Intake 855 ml   Output 2500 ml   Net -1645 ml         Gen: up in bed, comfortable, -O2, +TF (not running)  R chest tube - serosanguinous, -leak  CV: RRR, telemetry ST 100s, -edema LE  Pulm: CTA  Abd: BS+, ND, +tenderness diffuse  Derm: sternal incision D/I   R anterior chest incision D/I, +dressing    Labs:  BMP  Recent Labs   Lab 03/23/19  0609 03/22/19  0728 03/22/19  0010 03/20/19  0450   * 132* 130* 134   POTASSIUM 5.0 4.4 4.2 4.0   CHLORIDE 96 98 99 98   RADAMES 8.4* 8.2* 7.8* 7.9*   CO2 17* 19* 20 20   BUN 98* 89* 84* 58*   CR 3.00* 2.82* 2.66* 2.31*   * 107* 135* 254*     CBC  Recent Labs   Lab 03/23/19  0609 03/22/19  0728 03/22/19  0010 03/21/19  0710   WBC 7.6 7.5 8.6 10.5   RBC 2.51* 2.28* 2.36* 2.39*   HGB 7.7* 7.0* 7.1* 7.3*   HCT 24.7* 22.3* 23.3* 23.6*   MCV 98 98 99 99   MCH 30.7 30.7 30.1 30.5   MCHC  31.2* 31.4* 30.5* 30.9*   RDW 19.2* 18.9* 19.0* 18.6*   * 88* 82* 80*     INR  Recent Labs   Lab 03/23/19  0609 03/22/19  0728 03/22/19  0010 03/21/19  0710   INR 1.35* 1.37* 1.35* 1.33*      Hepatic Panel   Lab Results   Component Value Date    AST 36 03/18/2019     Lab Results   Component Value Date    ALT 57 03/18/2019     Lab Results   Component Value Date    ALBUMIN 2.6 03/18/2019     GLUCOSE:   Recent Labs   Lab 03/23/19  0609 03/23/19  0404 03/23/19  0004 03/22/19 2010 03/22/19  1637 03/22/19  1438 03/22/19  0728 03/22/19  0012 03/22/19  0010  03/20/19  0450  03/19/19  0400  03/18/19  0505   *  --   --   --   --   --  107*  --  135*  --  254*  --  97  --  200*   BGM  --  103* 104* 128* 121* 129*  --  141*  --    < >  --    < >  --    < >  --     < > = values in this interval not displayed.     Culture Micro (Abnormal) 03/20/2019  4:30    Abnormal   Light growth   Klebsiella oxytoca   Susceptibility testing in progress          Imaging:    3/21/19  7:55 PM [id number] [facility], [place],  Radiology    PACS Images      Show images for XR Chest 2 Views   Study Result     XR CHEST 2 VW  3/21/2019 7:55 PM       HISTORY: pneumonia, positive sputum culture.     COMPARISON: CT of the chest 3/19/2019, chest x-ray 3/15/2019     FINDINGS:   PA and lateral views of the chest.     Sternotomy wires unfractured. Right IJ with tip projecting over the  cavoatrial junction. Enteric tube courses outside the field-of-view.     Trachea is midline. Cardiomediastinal silhouette is well-defined. The  pulmonary vasculature is indistinct and there is a new right basilar  interstitial consolidation. Left basilar presumed atelectasis is  stable. There are small bilateral pleural effusions which are new..                                                                      IMPRESSION:   Interval development of bilateral interstitial processes, right  greater than left. This could represent asymmetrical pulmonary edema  but  infections are of concern.     I have personally reviewed the examination and initial interpretation  and I agree with the findings.     RADHA ALY MD       A/P:  Everton Larios is a 53 year old male with PMH etoh abuse, hypothyroidism, intermittent asthma, ulcerative colitis, Depression, Chronic HFrEF, NICM admitted with cardiogenic shock requiring subclavian balloon pump. He is now s/p OHT 2/24/19 with Piyush House and Christopher.    - RHC and biopsy 3/4; 0R, no AMR  - RHC and biopsy 3/11; 0R, no AMR  - RHC and biopsy 3/19; results pending     Neuro:   - Neuro intact. ICU delirium improving. Head CT 3/9 without acute pathology.   - Hx Depression: pta cymbalta  - Sleep: melatonin   - Tylenol and oxycodone  -  tramadol      CV:   - Hx of NICM, now S/P heart transplant 2/24  - No arrhythmia, HD stable.  - ASA 81 mg, Crestor  - Dobutamine 2.5 mcg/kg/min for mild decreased RV function-now off. Sildenafil 20 mg TID.      Pulm:   - Hx intermittent asthma, pta Singulair.   - Pulm toilet, IS, activity and deep breathing   - Supplemental O2 PRN to keep sats > 92%. Wean off as tolerated.  - LLL consolidation, albuterol and mucomyst Nebs today x 2, assess response.   - Sputum culture showing lactose fermenting gram neg rods, now showing strain of Klebsiella- zosyn started 3/21, will changed to bactrim with sensitivities       ID:   - WBC 7.6, afebrile, no signs or symptoms of infection other than SOB. Completed 7 days cefepime/zosyn 3/16.   - Blood cultures 3/13: NGTD. C diff negative 3/17.    - Sputum culture positive 3/20, zosyn started 3/21, start bactrim     GI / FEN:  - Reg diet, bowel regimen. Calorie counts. Na 131   - Tube feeds were off 3/21 for nausea. New formula 3/22 pm.   - Hx ulcerative colitis; pta mesalamine. Stool occult negative 3/20.  - +BM  - Increase PPI to bid for GERD     Renal / :   - Creatinine 2.82, adequate UOP  - Nephrology following for oliguria and T/Th/Sa hemodialysis. Unable to get  tunneled dialysis catheter due to lena drain to old right pacer pocket. RIJ dialysis catheter no longer functional and dialysis unable to be performed 3/21. Patient is showing signs of renal recovery but still not enough to go completely without dialysis. RIJ dialysis catheter done on 3/22     Heme / Anticoagulation:  - Hgb stable 7's, Plts 114.   - Will need coumadin eventually for SVC graft per Dr House. Will need to discuss with Cardiology, may prefer Apixaban due to frequent biopsies.    - No biopsies or central line access on left due to SVC graft.   - Holding Hep gtt since 3/18. Has R chest hematoma shouldn't interfere with RIGHT chest HD tunneled catheter placement. Chest CT 3/19 confirms R chest hematoma at old AICD site, s/p drainage in OR 3/21.   Endo:   - Hypothyroid: levothyroxine 100 mcg daily   - Sliding corrective scale q 4 hrs 3/20, consider 5 units NPH with tube feeds depending on total insulin needed today.      PPX:   - Protonix     Dispo:   - 6C since 3/16  Encouraged IS/TCDB/amb. Sternal precautions. Redressed lena drain area due to leaking. Stripped chest tube and will have nursing change bid and prn for saturation. will tear down dressing over pacer site tomorrow. Continue to monitor abd. Continue to monitor.    Jose Casarez PA-C  [phone]

## 2019-03-24 PROBLEM — Z79.2 PROPHYLACTIC ANTIBIOTIC: Status: ACTIVE | Noted: 2019-03-24

## 2019-03-24 PROBLEM — D50.9 IRON DEFICIENCY ANEMIA: Status: ACTIVE | Noted: 2018-10-18

## 2019-03-24 PROBLEM — K51.90 ULCERATIVE COLITIS (H): Chronic | Status: ACTIVE | Noted: 2018-10-04

## 2019-03-24 PROBLEM — J15.0: Status: ACTIVE | Noted: 2019-03-24

## 2019-03-24 LAB
ABO + RH BLD: NORMAL
ABO + RH BLD: NORMAL
ANION GAP SERPL CALCULATED.3IONS-SCNC: 11 MMOL/L (ref 3–14)
BLD GP AB SCN SERPL QL: NORMAL
BLD PROD TYP BPU: NORMAL
BLD UNIT ID BPU: 0
BLOOD BANK CMNT PATIENT-IMP: NORMAL
BLOOD PRODUCT CODE: NORMAL
BPU ID: NORMAL
BUN SERPL-MCNC: 68 MG/DL (ref 7–30)
CALCIUM SERPL-MCNC: 8 MG/DL (ref 8.5–10.1)
CHLORIDE SERPL-SCNC: 97 MMOL/L (ref 94–109)
CO2 SERPL-SCNC: 24 MMOL/L (ref 20–32)
CREAT SERPL-MCNC: 2.07 MG/DL (ref 0.66–1.25)
ERYTHROCYTE [DISTWIDTH] IN BLOOD BY AUTOMATED COUNT: 19.6 % (ref 10–15)
GFR SERPL CREATININE-BSD FRML MDRD: 35 ML/MIN/{1.73_M2}
GLUCOSE BLDC GLUCOMTR-MCNC: 108 MG/DL (ref 70–99)
GLUCOSE BLDC GLUCOMTR-MCNC: 191 MG/DL (ref 70–99)
GLUCOSE BLDC GLUCOMTR-MCNC: 224 MG/DL (ref 70–99)
GLUCOSE SERPL-MCNC: 98 MG/DL (ref 70–99)
HCT VFR BLD AUTO: 22.1 % (ref 40–53)
HGB BLD-MCNC: 6.8 G/DL (ref 13.3–17.7)
HGB BLD-MCNC: 6.9 G/DL (ref 13.3–17.7)
INR PPP: 1.4 (ref 0.86–1.14)
MAGNESIUM SERPL-MCNC: 2.3 MG/DL (ref 1.6–2.3)
MCH RBC QN AUTO: 30.4 PG (ref 26.5–33)
MCHC RBC AUTO-ENTMCNC: 30.8 G/DL (ref 31.5–36.5)
MCV RBC AUTO: 99 FL (ref 78–100)
NUM BPU REQUESTED: 1
PHOSPHATE SERPL-MCNC: 4.7 MG/DL (ref 2.5–4.5)
PLATELET # BLD AUTO: 115 10E9/L (ref 150–450)
POTASSIUM SERPL-SCNC: 3.8 MMOL/L (ref 3.4–5.3)
RBC # BLD AUTO: 2.24 10E12/L (ref 4.4–5.9)
SODIUM SERPL-SCNC: 132 MMOL/L (ref 133–144)
SPECIMEN EXP DATE BLD: NORMAL
TACROLIMUS BLD-MCNC: 6.3 UG/L (ref 5–15)
TME LAST DOSE: NORMAL H
TRANSFUSION STATUS PATIENT QL: NORMAL
WBC # BLD AUTO: 5 10E9/L (ref 4–11)

## 2019-03-24 PROCEDURE — 86901 BLOOD TYPING SEROLOGIC RH(D): CPT | Performed by: STUDENT IN AN ORGANIZED HEALTH CARE EDUCATION/TRAINING PROGRAM

## 2019-03-24 PROCEDURE — 86923 COMPATIBILITY TEST ELECTRIC: CPT | Performed by: STUDENT IN AN ORGANIZED HEALTH CARE EDUCATION/TRAINING PROGRAM

## 2019-03-24 PROCEDURE — 25000132 ZZH RX MED GY IP 250 OP 250 PS 637: Performed by: PHYSICIAN ASSISTANT

## 2019-03-24 PROCEDURE — 85027 COMPLETE CBC AUTOMATED: CPT | Performed by: THORACIC SURGERY (CARDIOTHORACIC VASCULAR SURGERY)

## 2019-03-24 PROCEDURE — 25000132 ZZH RX MED GY IP 250 OP 250 PS 637: Performed by: INTERNAL MEDICINE

## 2019-03-24 PROCEDURE — 25000128 H RX IP 250 OP 636: Performed by: PHYSICIAN ASSISTANT

## 2019-03-24 PROCEDURE — 21400000 ZZH R&B CCU UMMC

## 2019-03-24 PROCEDURE — 25000131 ZZH RX MED GY IP 250 OP 636 PS 637: Performed by: INTERNAL MEDICINE

## 2019-03-24 PROCEDURE — 27210437 ZZH NUTRITION PRODUCT SEMIELEM INTERMED LITER

## 2019-03-24 PROCEDURE — 99232 SBSQ HOSP IP/OBS MODERATE 35: CPT | Mod: GC | Performed by: INTERNAL MEDICINE

## 2019-03-24 PROCEDURE — P9016 RBC LEUKOCYTES REDUCED: HCPCS | Performed by: STUDENT IN AN ORGANIZED HEALTH CARE EDUCATION/TRAINING PROGRAM

## 2019-03-24 PROCEDURE — 80048 BASIC METABOLIC PNL TOTAL CA: CPT | Performed by: THORACIC SURGERY (CARDIOTHORACIC VASCULAR SURGERY)

## 2019-03-24 PROCEDURE — 86850 RBC ANTIBODY SCREEN: CPT | Performed by: STUDENT IN AN ORGANIZED HEALTH CARE EDUCATION/TRAINING PROGRAM

## 2019-03-24 PROCEDURE — 25000132 ZZH RX MED GY IP 250 OP 250 PS 637: Performed by: STUDENT IN AN ORGANIZED HEALTH CARE EDUCATION/TRAINING PROGRAM

## 2019-03-24 PROCEDURE — 25000132 ZZH RX MED GY IP 250 OP 250 PS 637: Performed by: NURSE PRACTITIONER

## 2019-03-24 PROCEDURE — 86900 BLOOD TYPING SEROLOGIC ABO: CPT | Performed by: STUDENT IN AN ORGANIZED HEALTH CARE EDUCATION/TRAINING PROGRAM

## 2019-03-24 PROCEDURE — 83735 ASSAY OF MAGNESIUM: CPT | Performed by: THORACIC SURGERY (CARDIOTHORACIC VASCULAR SURGERY)

## 2019-03-24 PROCEDURE — 25000125 ZZHC RX 250: Performed by: INTERNAL MEDICINE

## 2019-03-24 PROCEDURE — 00000146 ZZHCL STATISTIC GLUCOSE BY METER IP

## 2019-03-24 PROCEDURE — 36592 COLLECT BLOOD FROM PICC: CPT | Performed by: THORACIC SURGERY (CARDIOTHORACIC VASCULAR SURGERY)

## 2019-03-24 PROCEDURE — 80197 ASSAY OF TACROLIMUS: CPT | Performed by: THORACIC SURGERY (CARDIOTHORACIC VASCULAR SURGERY)

## 2019-03-24 PROCEDURE — 85610 PROTHROMBIN TIME: CPT | Performed by: THORACIC SURGERY (CARDIOTHORACIC VASCULAR SURGERY)

## 2019-03-24 PROCEDURE — 84100 ASSAY OF PHOSPHORUS: CPT | Performed by: THORACIC SURGERY (CARDIOTHORACIC VASCULAR SURGERY)

## 2019-03-24 PROCEDURE — 36592 COLLECT BLOOD FROM PICC: CPT | Performed by: STUDENT IN AN ORGANIZED HEALTH CARE EDUCATION/TRAINING PROGRAM

## 2019-03-24 PROCEDURE — 25000132 ZZH RX MED GY IP 250 OP 250 PS 637: Performed by: SURGERY

## 2019-03-24 PROCEDURE — 85018 HEMOGLOBIN: CPT | Performed by: STUDENT IN AN ORGANIZED HEALTH CARE EDUCATION/TRAINING PROGRAM

## 2019-03-24 RX ORDER — TACROLIMUS 1 MG/1
2 CAPSULE ORAL
Status: DISCONTINUED | OUTPATIENT
Start: 2019-03-25 | End: 2019-03-26

## 2019-03-24 RX ADMIN — Medication 500000 UNITS: at 20:32

## 2019-03-24 RX ADMIN — LEVOTHYROXINE SODIUM 100 MCG: 100 TABLET ORAL at 08:07

## 2019-03-24 RX ADMIN — PANTOPRAZOLE SODIUM 40 MG: 40 TABLET, DELAYED RELEASE ORAL at 08:07

## 2019-03-24 RX ADMIN — FLUTICASONE PROPIONATE 2 PUFF: 220 AEROSOL, METERED RESPIRATORY (INHALATION) at 08:09

## 2019-03-24 RX ADMIN — MYCOPHENOLATE MOFETIL 1000 MG: 500 TABLET, FILM COATED ORAL at 17:33

## 2019-03-24 RX ADMIN — MYCOPHENOLATE MOFETIL 1000 MG: 500 TABLET, FILM COATED ORAL at 08:06

## 2019-03-24 RX ADMIN — SILDENAFIL 20 MG: 20 TABLET ORAL at 14:26

## 2019-03-24 RX ADMIN — CALCIUM CARBONATE (ANTACID) CHEW TAB 500 MG 500 MG: 500 CHEW TAB at 15:22

## 2019-03-24 RX ADMIN — PREDNISONE 10 MG: 10 TABLET ORAL at 20:33

## 2019-03-24 RX ADMIN — CALCIUM CARBONATE (ANTACID) CHEW TAB 500 MG 500 MG: 500 CHEW TAB at 02:01

## 2019-03-24 RX ADMIN — PANTOPRAZOLE SODIUM 40 MG: 40 TABLET, DELAYED RELEASE ORAL at 20:33

## 2019-03-24 RX ADMIN — SENNOSIDES AND DOCUSATE SODIUM 1 TABLET: 8.6; 5 TABLET ORAL at 20:32

## 2019-03-24 RX ADMIN — ACETAMINOPHEN 650 MG: 325 TABLET, FILM COATED ORAL at 10:19

## 2019-03-24 RX ADMIN — ROSUVASTATIN CALCIUM 10 MG: 10 TABLET, FILM COATED ORAL at 08:07

## 2019-03-24 RX ADMIN — Medication 5 ML: at 05:47

## 2019-03-24 RX ADMIN — RANITIDINE 150 MG: 150 TABLET ORAL at 08:07

## 2019-03-24 RX ADMIN — MESALAMINE 2400 MG: 800 TABLET, DELAYED RELEASE ORAL at 08:06

## 2019-03-24 RX ADMIN — SILDENAFIL 20 MG: 20 TABLET ORAL at 20:33

## 2019-03-24 RX ADMIN — FLUTICASONE PROPIONATE 2 PUFF: 220 AEROSOL, METERED RESPIRATORY (INHALATION) at 20:32

## 2019-03-24 RX ADMIN — SILDENAFIL 20 MG: 20 TABLET ORAL at 08:06

## 2019-03-24 RX ADMIN — Medication 500000 UNITS: at 17:33

## 2019-03-24 RX ADMIN — TACROLIMUS 2 MG: 1 CAPSULE ORAL at 17:33

## 2019-03-24 RX ADMIN — MESALAMINE 2400 MG: 800 TABLET, DELAYED RELEASE ORAL at 20:33

## 2019-03-24 RX ADMIN — MONTELUKAST SODIUM 10 MG: 10 TABLET, COATED ORAL at 22:34

## 2019-03-24 RX ADMIN — SULFAMETHOXAZOLE AND TRIMETHOPRIM 1 TABLET: 800; 160 TABLET ORAL at 20:33

## 2019-03-24 RX ADMIN — Medication 500000 UNITS: at 08:07

## 2019-03-24 RX ADMIN — Medication 5 ML: at 20:32

## 2019-03-24 RX ADMIN — TACROLIMUS 1.5 MG: 1 CAPSULE ORAL at 08:07

## 2019-03-24 RX ADMIN — MELATONIN TAB 3 MG 3 MG: 3 TAB at 20:33

## 2019-03-24 RX ADMIN — ACETAMINOPHEN 650 MG: 325 TABLET, FILM COATED ORAL at 15:22

## 2019-03-24 RX ADMIN — Medication 500000 UNITS: at 12:06

## 2019-03-24 RX ADMIN — DULOXETINE 20 MG: 20 CAPSULE, DELAYED RELEASE ORAL at 08:07

## 2019-03-24 RX ADMIN — Medication 1 CAPSULE: at 08:06

## 2019-03-24 RX ADMIN — Medication 2.5 MG: at 10:19

## 2019-03-24 RX ADMIN — PREDNISONE 15 MG: 10 TABLET ORAL at 08:07

## 2019-03-24 ASSESSMENT — PAIN DESCRIPTION - DESCRIPTORS
DESCRIPTORS: DISCOMFORT

## 2019-03-24 ASSESSMENT — ACTIVITIES OF DAILY LIVING (ADL)
ADLS_ACUITY_SCORE: 14
ADLS_ACUITY_SCORE: 13
ADLS_ACUITY_SCORE: 14

## 2019-03-24 NOTE — CONSULTS
[facility]  Transplant Infectious Disease Consult Note - New Patient     Patient:  Everton Larios, Date of birth 1965, Medical record number [id number]  Date of Visit:  03/24/2019  Consult requested by Dr. Rosendo Beck for evaluation of antibiotic options for Klebsiella oxytoca pneumonia         Assessment and Recommendations:   Recommendations:  - No further changes to Klebsiella-specific antibiotic regimen needed at this time.   - He has been off valcyte since 3/12/2019, which is actually getting to be too long. Would consider restarting valcyte at renal-dose adjusted prophylaxis doses. The main toxicity of valcyte is the WBC line, less frequently the RBC or plt lines.   - Since he has been off valcyte since 3/12/2019, would check CMV DNA weekly for a couple of more checks (was negative on 3/18/2019), before going to monthly checks.     Thank you very much for this consultation. Transplant Infectious Disease will not see him further, unless called. Coverage next week, starting Monday 3/35/2019, will be:  SOT Staff:  Dr. Armen Casanova, pager [phone]    Assessment:  Everton Larios is a 54 yo gentleman who underwent orthotopic heart transplant 2/24/19.  Infectious Disease issues include:  - Klebsiella oxytoca from sputum specimen 3/20/2019. He has retrocardiac opacity on 3/22/2019 pCXR, and nodular groundglass opacities in the right lung on 3/19/2019. He is having clinical symptom improvement since zosyn was restarted 3/21/2019 (had ended 3/16/2019). Sensitivities returned on the Klebsiella oxytoca, and it is sensitive to bactrim, so he is back on bactrim as of 3/23/2019. This is fine.   - QTc interval prolonged: 586 msec on 3/11/2019 EKG. Have to avoid levaquin for now.   - Pneumocystis prophylaxis: pentamidine given 3/3/2019, now on Bactrim  - Fungal prophylaxis: nystatin since he had some thrush.   - Viral serostatus & prophylaxis: CMV+, EBV+, HSV1+, HSV2 neg, VZV+; holding valcyte  due to anemia and thrombocytopenia.   - Immunization status: to readdress at 6-months following transplant.   - Gamma globulin status: unknown  - Isolation status: Good hand hygiene.    Cari Sagastume MD   Pager [phone]         History of Infectious Disease Illness:   Everton Larios is a 52 yo gentleman who underwent orthotopic heart transplant 2/24/19 for non ischemic cardiomyopathy and cardiogenic shock. Post-op complicated by RV dysfunction and FRANKLIN. He is now on intermittent HD, able to skip last dialysis session, so renal recovery looks promising. He completed antibiotics cefepime/zosyn for 7 day course (ending 3/16/19). On 3/20/2019, WBC had increased up to 10.2K. Sputum sample had been sent as he had cough. Grew Klebsiella, restarted on zosyn. Everton reports that he has had airway passage clogging and mucous since his heart transplant surgery. He sits up using the bed controls to cough and spit, and he does this every few hours. After sleeping, he also does this routine at 5 am in the morning, and this first morning coughing session can continue intermittently for 30 minutes. He feels better since antibiotics were restarted. Sensitivities returned on the Klebsiella oxytoca, and it is sensitive to bactrim, so he is back on bactrim as of 3/23/2019.      Transplants:  2/24/2019 (Heart), Postoperative day:  28.  Coordinator Jocelynn Jerez    Review of Systems:  CONSTITUTIONAL:  No fevers. He does report chills. No sweats.  EYES: negative for icterus or acute vision changes  ENT:  negative for acute hearing loss, but he has long term hearing loss from having been a . Throat is raw from dry air, but no sore throat  RESPIRATORY:  + cough, + yellowish sputum, no dyspnea  CARDIOVASCULAR:  negative for chest pain, palpitations  GASTROINTESTINAL:  + nausea, no vomiting, last episode of dry heaves on 3/21/2019. BMs are runny, although he is on tube feeds for nutrition.   GENITOURINARY:  negative for  "dysuria or hematuria  HEME:  + easy bruising. + bleeding (\"blood seeping out everywhere\")  INTEGUMENT:  negative for rash or pruritus. Skin tears and bruises easily.  NEURO:  Negative for headache, but + tremor from prograf.     Past Medical History:   Diagnosis Date     Alcohol abuse      Pierce's esophagus 10/4/2018     Chronic rhinitis 10/4/2018     Chronic systolic heart failure (H) 10/4/2018     Depression 10/4/2018     Diastolic dysfunction      DJD (degenerative joint disease) - neck 10/4/2018     H/O congenital atrial septal defect (ASD) repair at age 3 10/4/2018     Hypothyroidism due to medication 10/4/2018     ICD, NEURONIX 2008; gen change 2/2018 10/4/2018     Intermittent asthma without complication 10/4/2018     Nonischemic cardiomyopathy (H) 10/4/2018     On amiodarone therapy 10/4/2018     Paroxysmal atrial fibrillation (H) 10/4/2018     S/P ablation of atrial fibrillation 10/4/2018     Systolic heart failure (H)      Ulcerative colitis (H) 10/4/2018     Ventricular tachycardia (H) 10/4/2018       Past Surgical History:   Procedure Laterality Date     AICD, DUAL CHAMBER       CV HEART BIOPSY N/A 3/4/2019    Procedure: Heart Biopsy;  Surgeon: Abhijeet Titus MD;  Location:  HEART CARDIAC CATH LAB     CV INTRA-AORTIC BALLOON PUMP INSERTION N/A 2/18/2019    Procedure: Intra-Aortic Balloon;  Surgeon: Alton Solomon MD;  Location:  HEART CARDIAC CATH LAB     CV INTRA-AORTIC BALLOON PUMP INSERTION N/A 2/20/2019    Procedure: Replace subclavian IABP;  Surgeon: Yves Rodrigues MD;  Location:  HEART CARDIAC CATH LAB     CV LEFT HEART CATH N/A 3/19/2019    Procedure: Left Heart Cath;  Surgeon: Yves Rodrigues MD;  Location:  HEART CARDIAC CATH LAB     CV RIGHT HEART CATH N/A 3/19/2019    Procedure: RHC/HBx - Femoral access for 3/19 per Nimisha GONZALEZ;  Surgeon: Yves Rodrigues MD;  Location:  HEART CARDIAC CATH LAB     INCISION AND DRAINAGE CHEST WASHOUT, COMBINED N/A " 3/21/2019    Procedure: Incision And Drainage; Evacuation Right Chest Wall Hematoma;  Surgeon: Dewayne House MD;  Location: [facility] OR     INSERT INTRAAORTIC BALLOON PUMP Left 2/19/2019    Procedure: Insert left  Subclavian Balloon Pump,  Removal Right femoral arterial balloom pump sheath;  Surgeon: Dewayne House MD;  Location: [facility] OR     INSERT INTRAAORTIC BALLOON PUMP Left 2/21/2019    Procedure: SUBCLAVIAN BALLOON PUMP PLACEMENT;  Surgeon: Ben White MD;  Location: [facility] OR     TRANSPLANT HEART RECIPIENT N/A 2/24/2019    Procedure: TRANSPLANT HEART RECIPIENT;  Surgeon: Dewayne House MD;  Location: [facility] OR       Family History   Problem Relation Age of Onset     Endometrial Cancer Mother      Hypertension Father      Endometrial Cancer Maternal Grandmother        Social History     Social History Narrative    Everton is a retired . He [place] by himself [place]. He has no lawn care responsibilities. He will be staying with his folks during his post-hospital early transplant care time. No history of TB exposures.      Social History     Tobacco Use     Smoking status: Former Smoker     Years: 10.00     Smokeless tobacco: Never Used   Substance Use Topics     Alcohol use: Yes     Alcohol/week: 0.6 oz     Types: 1 Cans of beer per week     Comment: a couple times a week      Drug use: No       Immunization History   Administered Date(s) Administered     Influenza (IIV3) PF 10/22/2008, 09/25/2009, 11/07/2011, 11/30/2012, 10/27/2013, 10/02/2014     Influenza Vaccine IM 3yrs+ 4 Valent IIV4 10/02/2015     Influenza Vaccine, 3 YRS +, IM (QUADRIVALENT W/PRESERVATIVES) 09/12/2016, 10/16/2017, 09/20/2018     OPV, trivalent, live 10/20/1978     Pneumo Conj 13-V (2010&after) 09/26/2018     TDAP Vaccine (Boostrix) 07/20/2012     Td (Adult), Adsorbed 10/20/1978       Patient Active Problem List   Diagnosis     H/O congenital atrial septal defect (ASD) repair at age 3      Nonischemic cardiomyopathy (H)     Paroxysmal atrial fibrillation (H)     Ventricular tachycardia (H)     On amiodarone therapy     Ulcerative colitis (H)     ICD, [employer] 2008; gen change 2/2018     S/P ablation of atrial fibrillation     Hypothyroidism due to medication     Chronic systolic heart failure (H)     DJD (degenerative joint disease) - neck     Pierce's esophagus     Intermittent asthma without complication     Chronic rhinitis     Depression     Iron deficiency anemia     Heart failure, chronic, with acute decompensation (H)     Acute on chronic systolic congestive heart failure (H)     Chronic HFrEF (heart failure with reduced ejection fraction) (H)     Heart replaced by transplant (H)     Heart failure (H)     Pneumonia of left lower lobe due to Klebsiella oxytoca (H)            Current Medications & Allergies:       DULoxetine  20 mg Oral Daily     fluticasone  2 puff Inhalation Q12H     heparin lock flush  5-15 mL Intracatheter Q24H     insulin aspart  1-6 Units Subcutaneous Q4H     levothyroxine  100 mcg Oral Daily     lidocaine  1 patch Transdermal Q24H     lidocaine   Transdermal Q8H     lidocaine   Transdermal Q24h     LORazepam  0.5 mg Oral Once     melatonin  3 mg Oral At Bedtime     mesalamine  2,400 mg Oral BID     montelukast  10 mg Oral At Bedtime     multivitamin RENAL  1 capsule Oral Daily     mycophenolate  1,000 mg Oral BID IS     nystatin  500,000 Units Oral 4x Daily     pantoprazole  40 mg Oral BID     predniSONE  10 mg Oral QPM     predniSONE  15 mg Oral Daily     ranitidine  150 mg Oral Daily     rosuvastatin  10 mg Oral Daily     senna-docusate  1 tablet Oral BID    Or     senna-docusate  2 tablet Oral BID     sildenafil  20 mg Oral TID     sodium chloride (PF)  10 mL Intracatheter Q8H     sodium chloride (PF)  3 mL Intracatheter Q8H     sulfamethoxazole-trimethoprim  1 tablet Oral QPM     tacrolimus  1.5 mg Oral QAM     tacrolimus  2 mg Oral QPM        Infusions/Drips:    IV fluid REPLACEMENT ONLY       - MEDICATION INSTRUCTIONS -       BETA BLOCKER NOT PRESCRIBED         Allergies   Allergen Reactions     Hydromorphone Other (See Comments)     Significant Delirium     Lisinopril Other (See Comments)     hypotension     Codeine Nausea            Physical Exam:   Vitals were reviewed.  All vitals stable.  Patient Vitals for the past 24 hrs:   BP Temp Temp src Pulse Resp SpO2 Weight   03/24/19 0759 113/78 97.9  F (36.6  C) Oral -- 18 99 % --   03/24/19 0400 119/80 98  F (36.7  C) Oral -- 18 98 % --   03/23/19 2344 103/73 98  F (36.7  C) Oral -- 16 99 % --   03/23/19 1915 106/75 98.1  F (36.7  C) Oral -- 16 95 % --   03/23/19 1800 -- -- -- -- -- -- 55.7 kg (122 lb 12.8 oz)   03/23/19 1707 110/73 98.2  F (36.8  C) Oral 82 16 95 % --   03/23/19 1621 108/69 98.1  F (36.7  C) Oral -- -- 98 % --   03/23/19 1615 99/56 -- -- -- -- 98 % --   03/23/19 1600 107/66 -- -- -- -- 98 % --   03/23/19 1545 101/69 -- -- -- 16 98 % --   03/23/19 1530 102/68 -- -- -- 16 98 % --   03/23/19 1515 101/69 -- -- -- 16 99 % --   03/23/19 1500 103/69 -- -- -- 18 99 % --   03/23/19 1445 103/72 -- -- -- 18 99 % --   03/23/19 1430 102/70 -- -- -- 18 98 % --   03/23/19 1415 109/68 -- -- -- 18 98 % --   03/23/19 1400 105/68 -- -- -- 18 98 % --   03/23/19 1345 107/75 -- -- -- 18 100 % --   03/23/19 1330 111/79 -- -- -- 18 100 % --   03/23/19 1315 117/80 -- -- -- 18 100 % --   03/23/19 1313 116/85 -- -- -- 18 100 % --   03/23/19 1300 122/84 97.2  F (36.2  C) Oral -- 18 98 % --   03/23/19 1200 -- -- -- -- -- -- 56.3 kg (124 lb 1.6 oz)   03/23/19 1141 121/83 96.4  F (35.8  C) Axillary -- 18 100 % --     Ranges for vital signs:  Temp:  [96.4  F (35.8  C)-98.2  F (36.8  C)] 97.9  F (36.6  C)  Pulse:  [82] 82  Heart Rate:  [78-86] 82  Resp:  [16-18] 18  BP: ()/(56-85) 113/78  SpO2:  [95 %-100 %] 99 %  Vitals:    03/21/19 1631 03/23/19 1200 03/23/19 1800   Weight: 56.5 kg (124 lb 9 oz) 56.3 kg  (124 lb 1.6 oz) 55.7 kg (122 lb 12.8 oz)       Physical Examination:  GENERAL:  well-developed, well-nourished man, in bed in no acute distress.  HEAD:  Head is normocephalic, atraumatic   EYES:  Eyes have anicteric sclerae without conjunctival injection   ENT:  Oropharynx is moist without exudates or ulcers. Tongue is midline. Nasal feeding tube in place.  NECK:  Supple. Dialysis line in R internal jugular.   LUNGS:  Clear to auscultation bilateral, although diminished breath sounds in left base.   CARDIOVASCULAR:  Regular rate and rhythm with no murmur  ABDOMEN:  Normal bowel sounds, soft, nontender.   SKIN:  No acute rashes. Midline sternal incision healing as expected. Kerlix in place over right upper chest wall where a pacer had previously been in place. Various ecchymoses.   NEUROLOGIC:  Grossly nonfocal. Active x4 extremities         Laboratory Data:     Inflammatory Markers    Recent Labs   Lab Test 11/15/18  0955 10/22/18  1239   CRP 6.1 12.5       Metabolic Studies       Recent Labs   Lab Test 03/24/19  0552 03/23/19  0609  03/11/19  1152  03/06/19  0408  02/23/19  1835   * 131*   < >  --    < >  --    < > 133   POTASSIUM 3.8 5.0   < >  --    < >  --    < > 3.6   CHLORIDE 97 96   < >  --    < >  --    < > 93*   CO2 24 17*   < >  --    < >  --    < > 30   ANIONGAP 11 18*   < >  --    < >  --    < > 10   BUN 68* 98*   < >  --    < >  --    < > 28   CR 2.07* 3.00*   < >  --    < >  --    < > 1.03   GFRESTIMATED 35* 22*   < >  --    < >  --    < > 81   GLC 98 100*   < >  --    < >  --    < > 114*   A1C  --   --   --   --   --   --   --  5.7*   RADAMES 8.0* 8.4*   < >  --    < >  --    < > 8.9   PHOS 4.7* 6.8*   < >  --    < >  --    < > 3.6   MAG 2.3 2.5*   < >  --    < >  --    < > 2.0   LACT  --   --   --  0.7  --  0.6*   < >  --     < > = values in this interval not displayed.       Hepatic Studies    Recent Labs   Lab Test 03/18/19  0505 03/15/19  0409 03/14/19  1653 03/14/19  0329  03/10/19  0410    BILITOTAL 0.8 0.8  --  0.6   < > 0.8   DBIL 0.4*  --  0.4*  --   --   --    ALKPHOS 137 88  --  97   < > 104   PROTTOTAL 5.3* 4.4*  --  4.3*   < > 5.0*   ALBUMIN 2.6* 2.3*  --  2.2*   < > 2.8*   AST 36 20  --  23   < > 22   ALT 57 24  --  23   < > 19   LDH  --   --  320*  --   --  276*    < > = values in this interval not displayed.       Pancreatitis testing    Recent Labs   Lab Test 03/18/19  0505 02/23/19  1835 11/15/18  0955   AMYLASE 51 22*  --    LIPASE 237  --   --    TRIG  --   --  112       Gout Labs      Recent Labs   Lab Test 11/15/18  0955   URIC 9.9*       Hematology Studies      Recent Labs   Lab Test 03/24/19  0705 03/24/19  0552 03/23/19  0609 03/22/19  0728 03/22/19  0010 03/21/19  0710 03/20/19  0450  03/14/19  2116  03/13/19  1545   WBC  --  5.0 7.6 7.5 8.6 10.5 10.2   < > 16.6*   < > 17.1*   ANEU  --   --   --   --   --   --   --   --  16.5*  --  16.1*   ALYM  --   --   --   --   --   --   --   --  0.4*  --  0.4*   GRACE  --   --   --   --   --   --   --   --  0.3  --  0.4   AEOS  --   --   --   --   --   --   --   --  0.0  --  0.0   HGB 6.9* 6.8* 7.7* 7.0* 7.1* 7.3* 7.7*   < > 6.9*   < > 6.3*   HCT  --  22.1* 24.7* 22.3* 23.3* 23.6* 24.8*   < > 22.1*   < > 20.4*   PLT  --  115* 114* 88* 82* 80* 78*   < > 38*   < > 47*    < > = values in this interval not displayed.       Clotting Studies    Recent Labs   Lab Test 03/24/19  0552 03/23/19  0609 03/22/19  0728 03/22/19  0010  03/14/19  1653   INR 1.40* 1.35* 1.37* 1.35*   < > 1.36*   PTT  --   --   --   --   --  46*    < > = values in this interval not displayed.       Iron Testing    Recent Labs   Lab Test 03/24/19  0552  03/14/19  2116 03/14/19  1653  11/15/18  0955  10/11/18  1235   IRON  --   --   --   --   --   --   --  63   FEB  --   --   --   --   --   --   --  457*   IRONSAT  --   --   --   --   --   --   --  14*   NATE  --   --   --   --   --  1,045*  --   --    MCV 99   < > 94  --    < >  --    < > 87   B12  --   --   --   --   --  1,857*   --   --    HAPT  --   --   --  143  --   --   --   --    RETP  --   --  4.3* 4.1*   < >  --   --   --    RETICABSCT  --   --  100.8* 100.8*   < >  --   --   --     < > = values in this interval not displayed.       Arterial Blood Gas Testing    Recent Labs   Lab Test 03/11/19  1152 03/10/19  1214 03/09/19  0400 03/08/19  2147  02/25/19  1717 02/25/19  1502 02/25/19  0918  02/25/19  0350 02/25/19  0110   PH  --   --   --   --   --  7.32* 7.31* 7.33*  --  7.39 7.35   PCO2  --   --   --   --   --  36 51* 46*  --  42 42   PO2  --   --   --   --   --  110* 75* 125*  --  87 170*   HCO3  --   --   --   --   --  19* 26 24  --  25 23   O2PER 21 21 21 21.0   < > 3L 2  2 40.0  40.0   < > 40.0  40.0 60.0    < > = values in this interval not displayed.        Thyroid Studies     Recent Labs   Lab Test 03/09/19  1148 01/02/19  0906 11/20/18  0337 11/15/18  0955 10/11/18  1235   TSH 1.05 10.41*  --  15.97* 15.44*   T4  --  1.32 1.37  --  1.11       Urine Studies     Recent Labs   Lab Test 03/17/19  0045 03/04/19  1303 02/23/19  1921 02/18/19  0957 11/15/18  1000   URINEPH 5.5 5.0 6.0 5.5 5.0   NITRITE Negative Negative Negative Negative Negative   LEUKEST Negative Negative Negative Negative Negative   WBCU 15* 0 <1 1 1       Medication levels    Recent Labs   Lab Test 03/23/19  0609   TACROL 8.4       Microbiology:  Last Culture results with specimen source  Culture Micro   Date Value Ref Range Status   03/20/2019 Heavy growth  Normal nima    Final   03/20/2019 Light growth  Klebsiella oxytoca   (A)  Final   03/13/2019 No growth  Final   03/13/2019 No growth  Final   03/09/2019 No growth  Final   03/09/2019 No growth  Final   03/04/2019 No growth  Final   03/04/2019 No growth  Final   03/04/2019 No growth  Final   02/23/2019 (A)  Final    Canceled, Test credited  >10 Squamous epithelial cells/low power field indicates oral contamination. Please   recollect.     02/23/2019   Final    Notification of test cancellation was given  to  SHAY STEELE RN 2153 2.23.19 NDP      Specimen Description   Date Value Ref Range Status   03/20/2019 Sputum  Final   03/20/2019 Sputum  Final   03/17/2019 Feces  Final   03/13/2019 Blood Right Hand  Final   03/13/2019 Blood Right Hand  Final   03/10/2019 Feces  Final   03/09/2019 Blood Right Hand  Final   03/09/2019 Blood Left Hand  Final   03/04/2019 Blood Right Hand  Final   03/04/2019 Blood Right Hand  Final   03/04/2019 Catheter tip swan  Final   02/25/2019 Nares  Final   02/23/2019 Sputum  Final   02/23/2019 Sputum  Final   11/15/2018 Nares  Final        Last check of C difficile  C Diff Toxin B PCR   Date Value Ref Range Status   03/17/2019 Negative NEG^Negative Final     Comment:     Negative: Clostridium difficile target DNA sequences NOT detected, presumed   negative for Clostridium difficile toxin B or the number of bacteria present   may be below the limit of detection for the test.  FDA approved assay performed using [employer] GeneXpert real-time PCR.  A negative result does not exclude actual disease due to Clostridium difficile   and may be due to improper collection, handling and storage of the specimen   or the number of organisms in the specimen is below the detection limit of the   assay.         Syphilis Testing    Treponema Antibodies   Date Value Ref Range Status   02/22/2019 Nonreactive NR^Nonreactive Final       Quantiferon testing   Recent Labs   Lab Test 02/22/19  1057   TBRES Negative       Virology:  Log IU/mL of CMVQNT   Date Value Ref Range Status   03/18/2019 Not Calculated <2.1 [Log_IU]/mL Final       Hepatitis B Testing     Recent Labs   Lab Test 03/14/19  1218 02/15/19  1147   AUSAB 0.70 1.15   HBCAB  --  Nonreactive   HEPBANG Nonreactive Nonreactive        Hepatitis C Antibody   Date Value Ref Range Status   02/15/2019 Nonreactive NR^Nonreactive Final     Comment:     Assay performance characteristics have not been established for newborns,   infants, and children         CMV  Antibody IgG   Date Value Ref Range Status   02/23/2019 >8.0 (H) 0.0 - 0.8 AI Final     Comment:     Positive  Antibody index (AI) values reflect qualitative changes in antibody   concentration that cannot be directly associated with clinical condition or   disease state.     02/15/2019 >8.0 (H) 0.0 - 0.8 AI Final     Comment:     Positive  Antibody index (AI) values reflect qualitative changes in antibody   concentration that cannot be directly associated with clinical condition or   disease state.       Varicella Zoster Virus Antibody IgG   Date Value Ref Range Status   02/15/2019 6.8 (H) 0.0 - 0.8 AI Final     Comment:     Positive, suggests prev. exposure and probable immunity  Antibody index (AI) values reflect qualitative changes in antibody   concentration that cannot be directly associated with clinical condition or   disease state.       EBV Capsid Antibody IgG   Date Value Ref Range Status   02/23/2019 >8.0 (H) 0.0 - 0.8 AI Final     Comment:     Positive, suggests recent or past exposure  Antibody index (AI) values reflect qualitative changes in antibody   concentration that cannot be directly associated with clinical condition or   disease state.     02/15/2019 >8.0 (H) 0.0 - 0.8 AI Final     Comment:     Positive, suggests recent or past exposure  Antibody index (AI) values reflect qualitative changes in antibody   concentration that cannot be directly associated with clinical condition or   disease state.       Toxoplasma Antibody IgG   Date Value Ref Range Status   02/22/2019 <3.0 0.0 - 7.1 IU/mL Final     Comment:     Negative- Absence of detectable Toxoplasma gondii IgG antibodies. A negative   result does not rule out acute infection.  The magnitude of the measured result is not indicative of the amount of   antibody present. The concentrations of anti-Toxoplasma gondii IgG in a given   specimen determined with assays from different manufacturers can vary due to   differences in assay methods and  reagent specificity.       Herpes Simplex Virus Type 1 IgG   Date Value Ref Range Status   02/15/2019 >8.0 (H) 0.0 - 0.8 AI Final     Comment:     Positive.  IgG antibody to HSV-1 detected.  Antibody index (AI) values reflect qualitative changes in antibody   concentration that cannot be directly associated with clinical condition or   disease state.       Herpes Simplex Virus Type 2 IgG   Date Value Ref Range Status   02/15/2019 <0.2 0.0 - 0.8 AI Final     Comment:     No HSV-2 IgG antibodies detected.  Antibody index (AI) values reflect qualitative changes in antibody   concentration that cannot be directly associated with clinical condition or   disease state.         Imaging:  Recent Results (from the past 48 hour(s))   XR Chest Port 1 View    Narrative    EXAMINATION:  XR CHEST PORT 1 VW 3/22/2019 5:55 PM.    COMPARISON: Radiograph dated 3/21/2019.    HISTORY:  S/p new HD RIJ line placement    FINDINGS: Portable AP view of the chest. Interval exchange and  replacement of new right IJ central venous catheter with tip  projecting over the superior cavoatrial junction. Feeding tube courses  inferior left hemidiaphragm and beyond the field-of-view. Unchanged  right chest tube. Right midline catheter projects over the axilla.  Postsurgical changes of the chest with intact median sternotomy wires,  mediastinal surgical clips and suture in the right hilar region.  Surgical clips projecting over the left chest wall.    The trachea is midline. The cardiomediastinal silhouette is not  significantly changed. Pulmonary vasculature is indistinct. Right  basilar patchy airspace opacities, unchanged from prior. Dense left  retrocardiac airspace opacity, increased from prior. Small right  pleural effusion. No pneumothorax.      Impression    IMPRESSION:   1. New right IJ central venous catheter tip projects over the superior  cavoatrial junction. No pneumothorax. Remainder of the support devices  are stable, as described  above.  2. Right basilar patchy consolidation and small pleural effusion, may  represent aspiration versus infection in the appropriate clinical  context.  3. Left basilar and retrocardiac opacities, favored to represent  atelectasis.     I have personally reviewed the examination and initial interpretation  and I agree with the findings.    MICHAEL HOLDER MD

## 2019-03-24 NOTE — PROVIDER NOTIFICATION
Notified surgery cross cover of critical Hb.8. Per Dr. Xavi Brock, will recheck another hemoglobin and defer possible transfusion to primary team this morning. Will continue to monitor.

## 2019-03-24 NOTE — PLAN OF CARE
D- OHT 2/24  I/A- NSR. RA, SOB noted, saturations remain 98% on RA. Abdominal pain noted but improving, PRN Tylenol and Oxy effective for pain control. Loose BM x2 today. Producing urine but mixed with stool so unable to measure. Appetite slightly improving, calorie counts in place. BG WNL. Sat in chair x2, ambulated to bathroom x1 tolerated fair.    P- Continue to monitor and notify team of changes. NPO at 1030 tomorrow for C.

## 2019-03-24 NOTE — PROGRESS NOTES
ADVANCED HEART FAILURE PROGRESS NOTE      I personally saw and examined this patient, reviewed imaging and laboratory studies, confirmed physical examination and discussed results and plan with patient. Improved and agree with note below.  Ebony    SUBJECTIVE:  No acute events overnight.  Feeling okay this AM.    ROS otherwise negative.    OBJECTIVE:  Vital signs:  Temp: 98  F (36.7  C) Temp  Min: 96.4  F (35.8  C)  Max: 98.2  F (36.8  C)  Heart Rate: 80 Heart Rate  Min: 78  Max: 86  BP: 119/80 Systolic (24hrs), Av , Min:99 , Max:122   Diastolic (24hrs), Av, Min:56, Max:85    Resp: 18 Resp  Min: 16  Max: 18  SpO2: 98 % SpO2  Min: 95 %  Max: 100 %      Intake/Output Summary (Last 24 hours) at 3/24/2019 0724  Last data filed at 3/24/2019 0701  Gross per 24 hour   Intake 1080 ml   Output 1515 ml   Net -435 ml       Vitals:    19 1631 19 1200 19 1800   Weight: 56.5 kg (124 lb 9 oz) 56.3 kg (124 lb 1.6 oz) 55.7 kg (122 lb 12.8 oz)     Physical Exam:  GEN:  no distress, lying in bed  CV:  Regular rate and rhythm, no murmur.   CHEST: Subclavian incisions sutured.  Dressed right chest hematoma site.  LUNGS:  normal work of breathing, Clear to auscultation bilaterally, no wheezes or crackles.   ABD:  hyperactive bowel sounds, soft, mild RLQ tenderness, nondistended  EXT:  No edema or cyanosis. Normal distal puses  NEURO: no focal deficits    Medications:    IV fluid REPLACEMENT ONLY       - MEDICATION INSTRUCTIONS -       BETA BLOCKER NOT PRESCRIBED         Current Facility-Administered Medications   Medication Dose Route Frequency     DULoxetine  20 mg Oral Daily     fluticasone  2 puff Inhalation Q12H     heparin lock flush  5-15 mL Intracatheter Q24H     insulin aspart  1-6 Units Subcutaneous Q4H     levothyroxine  100 mcg Oral Daily     lidocaine  1 patch Transdermal Q24H     lidocaine   Transdermal Q8H     lidocaine   Transdermal Q24h     LORazepam  0.5 mg Oral Once     melatonin  3 mg Oral  At Bedtime     mesalamine  2,400 mg Oral BID     montelukast  10 mg Oral At Bedtime     multivitamin RENAL  1 capsule Oral Daily     mycophenolate  1,000 mg Oral BID IS     nystatin  500,000 Units Oral 4x Daily     pantoprazole  40 mg Oral BID     predniSONE  10 mg Oral QPM     predniSONE  15 mg Oral Daily     ranitidine  150 mg Oral Daily     rosuvastatin  10 mg Oral Daily     senna-docusate  1 tablet Oral BID    Or     senna-docusate  2 tablet Oral BID     sildenafil  20 mg Oral TID     sodium chloride (PF)  10 mL Intracatheter Q8H     sodium chloride (PF)  3 mL Intracatheter Q8H     sulfamethoxazole-trimethoprim  1 tablet Oral QPM     tacrolimus  1.5 mg Oral QAM     tacrolimus  2 mg Oral QPM         Labs:  CMP  Recent Labs   Lab 03/24/19  0552 03/23/19  0609  03/18/19  0505   * 131*   < > 132*   POTASSIUM 3.8 5.0   < > 4.1   CHLORIDE 97 96   < > 100   CO2 24 17*   < > 20   BUN 68* 98*   < > 52*   CR 2.07* 3.00*   < > 2.04*   RADAMES 8.0* 8.4*   < > 8.0*   MAG 2.3 2.5*   < > 2.1   PHOS 4.7* 6.8*   < > 3.0   GLC 98 100*   < > 200*   ALKPHOS  --   --   --  137   BILITOTAL  --   --   --  0.8   AST  --   --   --  36   ALT  --   --   --  57    < > = values in this interval not displayed.     BLOOD GASNo lab results found in last 7 days.  CBC  Recent Labs   Lab 03/24/19  0705 03/24/19  0552 03/23/19  0609   WBC  --  5.0 7.6   HGB 6.9* 6.8* 7.7*   HCT  --  22.1* 24.7*   PLT  --  115* 114*     COAG  Recent Labs   Lab 03/24/19  0552 03/23/19  0609   INR 1.40* 1.35*       ASSESSMENT/PLAN:  Everton Larios is a 52 yo gentleman who underwent orthotopic heart transplant 2/24/19 for non ischemic cardiomyopathy and cardiogenic shock. Post-op complicated by RV dysfunction and FRANKLIN.    Updates today  - follow up tacro dosing     #Oliguric renal failure:  Etiology cardiorenal from venous congestion vs. nephrotoxins.  - iHD per renal  - nephrology consulted, appreciate assistance  - no tunneled line in place for now, would need to be  placed on the right side, will continue monitoring for renal recovery.  Will either need tunneled line once able on right or await renal recovery.    #ID  - completed antibiotics cefepime/zosyn for 7 day course (ending 3/16/19)  - sputum cultures growing Klebsiella  - on Zosyn since 3/22  - will consult transplant ID for antibiotic recommendations     #S/p OHT on 2/24:  Graft function:  - echo 3/1 shows normal LV function, RV is mild to moderately dilated and hypokinetic  - now off dobutamine  - continue sildenafil 20 q8h     Immunosuppression:  - on MMF decreased at 1000 BID  - Prednisone 15mg BID, decreasing by 5mg daily with negative biopsies  - NOT on Simulect protocol  - tacrolimus 1.5/2 mg BID (goal 8-10)     Prophylaxis:  - CMV +/+: medium risk, Valcyte held given thrombocytopenia/anemia, will check weekly CMV titers  - PCP: pentamadine given 3/3, starting Bactrim  - Thrush: improved on nystatin  - GI: on PPI  - CAV: Crestor, holding ASA given bleeding at line sites     Biopsies:  - First biopsy 3/4, 0R, no AMR  - Second biopsy 3/11, 0R, no AMR  - Third biopsy 3/18, 1R, no AMR  - Fourth biopsy due 3/24:     Goretex conduit for persistent left SVC:  - will need anticoagulation, plan to start warfarin when stable after hematoma drainage  **NO CENTRAL VENOUS CATHETERS ON THE LEFT UPPER BODY**      #Thrombocytopenia/Anemia:   - holding valcyte     Seen and staffed with .    Santino Haque MD  Cardiology Fellow

## 2019-03-24 NOTE — PLAN OF CARE
D: Pt admitted 2/17 with cardiogenic shock, s/p OHT 2/24. PMH: ETOH, hypothyroidism, asthma, ulcerative colitis, depression, NICM.     I/A: A&Ox4. Vital signs stable on RA. Monitor shows sinus rhythm, HR 70's-80's. Pt states abdominal pain and nausea are improving, however pt still adamantly refusing tube feeding. Educated pt on importance of quality nutrition for surgical healing and encouraged PO intake. Blood sugars managed with sliding scale insulin. Voiding with adequate output. No BM overnight. TIFFANY drain to bulb suction draining bright red blood. Dressing changed x2 d/t saturation. Up with assist x2. Appeared to sleep well between cares, making needs known.      P: Continue to monitor. Notify CVTS with changes/concerns.

## 2019-03-24 NOTE — PROGRESS NOTES
CVTS Daily Note  3/24/2019  Attending: Dr House    S:   States that pain is being controlled. Denies any hallucinations.  Breathing is ok. Working with IS/flutter valve.  Appetite improved this am. No TF last night, +calore count  +BMs (last BM this am)  Denies any popping/clicking in his breast bone  Working with therapies. Very weak  Did not get up much yesterday due to dialysis in the afternoon.  Was able to get some sleep.  Had is internal jugular catheter switched out on 3/22.    O:   Vitals:    03/23/19 1915 03/23/19 2344 03/24/19 0400 03/24/19 0759   BP: 106/75 103/73 119/80 113/78   BP Location: Left arm Left arm Left arm Left arm   Pulse:       Resp: 16 16 18 18   Temp: 98.1  F (36.7  C) 98  F (36.7  C) 98  F (36.7  C) 97.9  F (36.6  C)   TempSrc: Oral Oral Oral Oral   SpO2: 95% 99% 98% 99%   Weight:       Height:         Vitals:    03/21/19 1631 03/23/19 1200 03/23/19 1800   Weight: 56.5 kg (124 lb 9 oz) 56.3 kg (124 lb 1.6 oz) 55.7 kg (122 lb 12.8 oz)       Intake/Output Summary (Last 24 hours) at 3/20/2019 0759  Last data filed at 3/20/2019 0600  Gross per 24 hour   Intake 855 ml   Output 2500 ml   Net -1645 ml         Gen: up in chair, comfortable, -O2, +TF (not running), eating Occitan toast  R chest tube - serosanguinous, -leak  CV: RRR, telemetry SR 80s, -edema LE  Pulm: CTA  Abd: BS+, ND, minimal tenderness (improved from yesterday)  Derm: sternal incision D/I   R anterior chest incision D/I, +dressing    Labs:  BMP  Recent Labs   Lab 03/24/19  0552 03/23/19  0609 03/22/19  0728 03/22/19  0010   * 131* 132* 130*   POTASSIUM 3.8 5.0 4.4 4.2   CHLORIDE 97 96 98 99   RADAMES 8.0* 8.4* 8.2* 7.8*   CO2 24 17* 19* 20   BUN 68* 98* 89* 84*   CR 2.07* 3.00* 2.82* 2.66*   GLC 98 100* 107* 135*     CBC  Recent Labs   Lab 03/24/19  0705 03/24/19  0552 03/23/19  0609 03/22/19  0728 03/22/19  0010   WBC  --  5.0 7.6 7.5 8.6   RBC  --  2.24* 2.51* 2.28* 2.36*   HGB 6.9* 6.8* 7.7* 7.0* 7.1*   HCT  --  22.1*  24.7* 22.3* 23.3*   MCV  --  99 98 98 99   MCH  --  30.4 30.7 30.7 30.1   MCHC  --  30.8* 31.2* 31.4* 30.5*   RDW  --  19.6* 19.2* 18.9* 19.0*   PLT  --  115* 114* 88* 82*     INR  Recent Labs   Lab 03/24/19  0552 03/23/19  0609 03/22/19  0728 03/22/19  0010   INR 1.40* 1.35* 1.37* 1.35*      Hepatic Panel   Lab Results   Component Value Date    AST 36 03/18/2019     Lab Results   Component Value Date    ALT 57 03/18/2019     Lab Results   Component Value Date    ALBUMIN 2.6 03/18/2019     GLUCOSE:   Recent Labs   Lab 03/24/19  0552 03/24/19  0441 03/23/19  2331 03/23/19  2051 03/23/19  1709 03/23/19  0944 03/23/19  0609 03/23/19  0404  03/22/19  0728  03/22/19  0010  03/20/19  0450  03/19/19  0400   GLC 98  --   --   --   --   --  100*  --   --  107*  --  135*  --  254*  --  97   BGM  --  108* 149* 192* 124* 85  --  103*   < >  --    < >  --    < >  --    < >  --     < > = values in this interval not displayed.     Culture Micro (Abnormal) 03/20/2019  4:30    Abnormal   Light growth   Klebsiella oxytoca   Susceptibility testing in progress          Imaging:    3/21/19  7:55 PM [id number] [facility], [place],  Radiology    PACS Images      Show images for XR Chest 2 Views   Study Result     XR CHEST 2 VW  3/21/2019 7:55 PM       HISTORY: pneumonia, positive sputum culture.     COMPARISON: CT of the chest 3/19/2019, chest x-ray 3/15/2019     FINDINGS:   PA and lateral views of the chest.     Sternotomy wires unfractured. Right IJ with tip projecting over the  cavoatrial junction. Enteric tube courses outside the field-of-view.     Trachea is midline. Cardiomediastinal silhouette is well-defined. The  pulmonary vasculature is indistinct and there is a new right basilar  interstitial consolidation. Left basilar presumed atelectasis is  stable. There are small bilateral pleural effusions which are new..                                                                      IMPRESSION:   Interval development of bilateral  interstitial processes, right  greater than left. This could represent asymmetrical pulmonary edema  but infections are of concern.     I have personally reviewed the examination and initial interpretation  and I agree with the findings.     RADHA ALY MD       A/P:  Everton Larios is a 53 year old male with PMH etoh abuse, hypothyroidism, intermittent asthma, ulcerative colitis, Depression, Chronic HFrEF, NICM admitted with cardiogenic shock requiring subclavian balloon pump. He is now s/p OHT 2/24/19 with Piyush House and Christopher.    - RHC and biopsy 3/4; 0R, no AMR  - RHC and biopsy 3/11; 0R, no AMR  - RHC and biopsy 3/18; 1R, no AMR   Neuro:   - Neuro intact. ICU delirium improving. Head CT 3/9 without acute pathology.   - Hx Depression: pta cymbalta  - Sleep: melatonin   - Tylenol and oxycodone  -  tramadol      CV:   - Hx of NICM, now S/P heart transplant 2/24  - No arrhythmia, HD stable.  - ASA 81 mg, Crestor  - Dobutamine 2.5 mcg/kg/min for mild decreased RV function-now off. Sildenafil 20 mg TID.      Pulm:   - Hx intermittent asthma, pta Singulair.   - Pulm toilet, IS, activity and deep breathing   - Supplemental O2 PRN to keep sats > 92%. Wean off as tolerated.  - LLL consolidation, albuterol and mucomyst Nebs today x 2, assess response.   - Sputum culture showing lactose fermenting gram neg rods, now showing strain of Klebsiella- zosyn started 3/21, will changed to bactrim with sensitivities, ID consulted       ID:   - WBC 7.6, afebrile, no signs or symptoms of infection other than SOB. Completed 7 days cefepime/zosyn 3/16.   - Blood cultures 3/13: NGTD. C diff negative 3/17.    - Sputum culture positive 3/20, bactrim, ID consulted     GI / FEN:  - Reg diet, bowel regimen. Calorie counts. Na 132  - Tube feeds were off 3/21 for nausea. New formula 3/22 pm.   - Hx ulcerative colitis; pta mesalamine. Stool occult negative 3/20.  - +BM  - Increase PPI to bid for GERD     Renal / :   -  Creatinine 2.82, adequate UOP  - Nephrology following for oliguria and T/Th/Sa hemodialysis. Unable to get tunneled dialysis catheter due to lena drain to old right pacer pocket. RIJ dialysis catheter no longer functional and dialysis unable to be performed 3/21. Patient is showing signs of renal recovery but still not enough to go completely without dialysis. RIJ dialysis catheter done on 3/22     Heme / Anticoagulation:  - Hgb 6.9 on recheck, Plts 115. Will d/w CT surgery about transfusion.   - Will need coumadin eventually for SVC graft per Dr House. Will need to discuss with Cardiology, may prefer Apixaban due to frequent biopsies.    - No biopsies or central line access on left due to SVC graft.   - Holding Hep gtt since 3/18. Has R chest hematoma shouldn't interfere with RIGHT chest HD tunneled catheter placement. Chest CT 3/19 confirms R chest hematoma at old AICD site, s/p drainage in OR 3/21.   Endo:   - Hypothyroid: levothyroxine 100 mcg daily   - Sliding corrective scale q 4 hrs 3/20, consider 5 units NPH with tube feeds depending on total insulin needed today.      PPX:   - Protonix     Dispo:   - 6C since 3/16  Encouraged IS/TCDB/amb. Sternal precautions. Will remove dressing over pacer site today. Will d/w with team about likely transfusion. Continue to monitor abd. Continue to monitor.    Jose Casarez PA-C  [phone]    Addendum: After discussing with CT surgery team will give 1 unit of PRBCs. Continue to monitor.

## 2019-03-24 NOTE — PROGRESS NOTES
Nephrology Progress Note  March 24, 2019        ASSESSMENT AND RECOMMENDATIONS:   Mr. Larios is a 53 year old male with history of chronic renal insufficiency, EtOH abuse, GIB w/splenic embolization, PAF s/p ablation and cardioversion, NICM c/b cardiogenic shock requiring IABP, s/p OHT (2/24/19) on tacrolimus and MMF, complicated by RV dysfunction per echo and acute oliguric kidney injury.      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 Baseline Crt 1.1- 1.4 over the last 6 months; however, prior BMPs have demonstrated patient Crt as high as 2.0 mg/dl. He most likely has a baseline level of CKD due to renal artery stenosis (longstanding smoking history, abdominal bruit on physical examination) and possible smaller FRANKLIN episodes from HFrEF exacerbations. Current rise in creatinine started 3/1 at 1.89, and increasing to 4.01 3/7 and oliguria is most likely due to nephrotoxicity associated with tacrolimus (a renal vasoconstrictor), and diuretic use. UOP improved but have not been measured accurately due to urination with frequent bowel movements.Suspect he could have some recovery of function. However, he continues to have anephric rise in serum creatinine between dialysis treatments. Patient was last dialyzed on 03/23  - No indications for acute hemodialysis today. Will plan next HD likely on Tuesday depends on  trend in serum creatinine  and UOP  -monitor BMP nad UOP daily  -avoid nephrotoxins including contrast and NSAID's  -renally dose medications    # S/p OHT -on IS with MMF, Tacrolimus. Tac level was 6.3 this am. Managed by transplant team.      #Electrolytes:  -mild hyponatremia . Improved to 132 this am. Should improve with RRT  -normal serum potassium of 3.8. Recommend to con't Nepro for TF and place on renal diet when able to take PO    #Acid Base status-AGMA likely secondary to FRANKLIN. Improved with  RRT. HCO3 is 24 this am.     #BP/Volume status:Acceptable BP. Patient appears euvolemic on exam. Weight is trending down. EDW ~54 kgs  -daily weight, strict I/O     #AnemiaHgb is trending down from 7.7 to 6.9 g/dl this am. Recommend to transfuse 1U PRBC.     #Thrombocytopenia: PLT count is stable this am      Recommendations were communicated to primary team via this note.     Seen and discussed with Dr. Samantha Shields MD  Nephrology Fellow  [phone]      Interval History :   Nursing and provider notes from last 24 hours reviewed.  Patient tolerated HD without issues, 1.1 L removed.  Patient is siting in bed. Patient accompanied by family at the bedside. Patient complaining of  bowel movements with watery stool. Patient states that he had 3 BM since this am.  Patient reports urinating large amounts of urine during bowel movements which have not bee measured.  Patient denies SOB. Pain controlled.  No fevers.    MEDICATIONS:  Current Meds    DULoxetine  20 mg Oral Daily     fluticasone  2 puff Inhalation Q12H     heparin lock flush  5-15 mL Intracatheter Q24H     insulin aspart  1-6 Units Subcutaneous Q4H     levothyroxine  100 mcg Oral Daily     lidocaine  1 patch Transdermal Q24H     lidocaine   Transdermal Q8H     lidocaine   Transdermal Q24h     LORazepam  0.5 mg Oral Once     melatonin  3 mg Oral At Bedtime     mesalamine  2,400 mg Oral BID     montelukast  10 mg Oral At Bedtime     multivitamin RENAL  1 capsule Oral Daily     mycophenolate  1,000 mg Oral BID IS     nystatin  500,000 Units Oral 4x Daily     pantoprazole  40 mg Oral BID     predniSONE  10 mg Oral QPM     predniSONE  15 mg Oral Daily     ranitidine  150 mg Oral Daily     rosuvastatin  10 mg Oral Daily     senna-docusate  1 tablet Oral BID    Or     senna-docusate  2 tablet Oral BID     sildenafil  20 mg Oral TID     sodium chloride (PF)  10 mL Intracatheter Q8H     sodium chloride (PF)  3 mL Intracatheter Q8H      "sulfamethoxazole-trimethoprim  1 tablet Oral QPM     [START ON 3/25/2019] tacrolimus  2 mg Oral QAM     tacrolimus  2 mg Oral QPM     Infusion Meds    IV fluid REPLACEMENT ONLY       - MEDICATION INSTRUCTIONS -       BETA BLOCKER NOT PRESCRIBED       REVIEW OF SYSTEMS:  A comprehensive of systems was not obtained due to mental status.      PHYSICAL EXAM:   Temp  Av.9  F (36.6  C)  Min: 95.9  F (35.5  C)  Max: 100  F (37.8  C)  Arterial Line BP  Min: 68/56  Max: 182/36  Arterial Line BP 2  Min: 90/54  Max: 107/56  Arterial Line MAP (mmHg)  Av.7 mmHg  Min: 57 mmHg  Max: 123 mmHg Arterial Line Location 2: Right femoral    Pulse  Av.5  Min: 59  Max: 140 Resp  Av.1  Min: 8  Max: 45  FiO2 (%)  Av %  Min: 40 %  Max: 100 %  SpO2  Av.1 %  Min: 88 %  Max: 100 %    CVP (mmHg): 21 mmHg  BP 97/79 (BP Location: Left arm)   Pulse 84   Temp 97.4  F (36.3  C) (Oral)   Resp 18   Ht 1.727 m (5' 8\")   Wt 55.7 kg (122 lb 12.8 oz)   SpO2 100%   BMI 18.67 kg/m     Date 19 07 - 19 0659   Shift 5605-9014 0390-3938 1655-4488 24 Hour Total   INTAKE   P.O. 100   100   I.V. 70.4   70.4   Shift Total(mL/kg) 170.4(2.74)   170.4(2.74)   OUTPUT   Chest Tube(mL/kg) 238(3.83)   238(3.83)   Shift Total(mL/kg) 238(3.83)   238(3.83)   Weight (kg) 62.1 62.1 62.1 62.1      Admit Weight: 63.5 kg (140 lb 1.6 oz)     GENERAL APPEARANCE: Alert, NAD  Lymphatics: no cervical LAD  HEENT: NJT in place, sclerae anicteric  Pulmonary: lungs clear to auscultation with equal breath sounds bilaterally  CV: regular rhythm, normal rate   - Edema: None  GI: soft, nontender, normal bowel sounds.  SKIN: no rash, warm - R Chest Dressing in place  NEURO: face symmetric, AO x 3    LABS:   I personally reviewed the labs.     CMP  Recent Labs   Lab 19  0552 19  0609 19  0728 19  0010  19  0505   * 131* 132* 130*   < > 132*   POTASSIUM 3.8 5.0 4.4 4.2   < > 4.1   CHLORIDE 97 96 98 99   < > 100 "   CO2 24 17* 19* 20   < > 20   ANIONGAP 11 18* 16* 11   < > 11   GLC 98 100* 107* 135*   < > 200*   BUN 68* 98* 89* 84*   < > 52*   CR 2.07* 3.00* 2.82* 2.66*   < > 2.04*   GFRESTIMATED 35* 22* 24* 26*   < > 35*   GFRESTBLACK 40* 26* 28* 30*   < > 41*   RADAMES 8.0* 8.4* 8.2* 7.8*   < > 8.0*   MAG 2.3 2.5* 2.0 1.9   < > 2.1   PHOS 4.7* 6.8* 5.5* 5.3*   < > 3.0   PROTTOTAL  --   --   --   --   --  5.3*   ALBUMIN  --   --   --   --   --  2.6*   BILITOTAL  --   --   --   --   --  0.8   ALKPHOS  --   --   --   --   --  137   AST  --   --   --   --   --  36   ALT  --   --   --   --   --  57    < > = values in this interval not displayed.     CBC  Recent Labs   Lab 03/24/19  0705 03/24/19  0552 03/23/19  0609 03/22/19  0728 03/22/19  0010   HGB 6.9* 6.8* 7.7* 7.0* 7.1*   WBC  --  5.0 7.6 7.5 8.6   RBC  --  2.24* 2.51* 2.28* 2.36*   HCT  --  22.1* 24.7* 22.3* 23.3*   MCV  --  99 98 98 99   MCH  --  30.4 30.7 30.7 30.1   MCHC  --  30.8* 31.2* 31.4* 30.5*   RDW  --  19.6* 19.2* 18.9* 19.0*   PLT  --  115* 114* 88* 82*     INR  Recent Labs   Lab 03/24/19  0552 03/23/19  0609 03/22/19  0728 03/22/19  0010   INR 1.40* 1.35* 1.37* 1.35*     ABG  No lab results found in last 7 days.   URINE STUDIES  Recent Labs   Lab Test 03/17/19  0045 03/04/19  1303 02/23/19  1921 02/18/19  0957   COLOR Yellow Yellow Yellow Yellow   APPEARANCE Slightly Cloudy Clear Clear Clear   URINEGLC 30* Negative Negative Negative   URINEBILI Negative Negative Negative Negative   URINEKETONE Negative Negative Negative Negative   SG 1.016 1.014 1.008 1.008   UBLD Small* Negative Negative Negative   URINEPH 5.5 5.0 6.0 5.5   PROTEIN 100* Negative Negative Negative   NITRITE Negative Negative Negative Negative   LEUKEST Negative Negative Negative Negative   RBCU 14* 0 <1 <1   WBCU 15* 0 <1 1     No lab results found.  PTH  No lab results found.  IRON STUDIES  Recent Labs   Lab Test 11/15/18  0955 10/11/18  1235   IRON  --  63   FEB  --  457*   IRONSAT  --  14*    NATE 1,045*  --        IMAGING:  Reviewed

## 2019-03-24 NOTE — PROGRESS NOTES
Nephrology Progress Note  March 23, 2019        ASSESSMENT AND RECOMMENDATIONS:   Mr. Larios is a 53 year old male with history of chronic renal insufficiency, EtOH abuse, GIB w/splenic embolization, PAF s/p ablation and cardioversion, NICM c/b cardiogenic shock requiring IABP, s/p OHT (2/24/19) on tacrolimus and MMF, complicated by RV dysfunction per echo and acute oliguric kidney injury.      # Anuric FRANKLIN on CKD stage II-III:Patient with history of chronic renal insufficiency and cortical scarring of the L kidney per Renal US 03/2016. Baseline Crt 1.1- 1.4 over the last 6 months; however, prior BMPs have demonstrated patient Crt as high as 2.0. He most likely has a baseline level of CKD due to renal artery stenosis (longstanding smoking history, abdominal bruit on physical examination) and possible smaller FRANKLIN episodes from HFrEF exacerbations. Current rise in creatinine started 3/1 at 1.89, and increasing to 4.01 3/7 and oliguria is most likely due to nephrotoxicity associated with tacrolimus (a renal vasoconstrictor), and diuretic use. Patient was last dialyzed on 03/19. However serum creatinine was trending up from 2.3 to 3 mg/dl in last 3 days. UOP remains in non-oliguric range. Patient put out 950 ml of urine yesterday. Suspect he could have some recovery of function.   - Will plan HD today, but trend UOP and Creatinine over next 2 days  -monitor BMP nad UOP daily  -avoid nephrotoxins including contrast and NSAID's  -renally dose medications    # S/p OHT -on IS with MMF, Tacrolimus. Tac level was 8.4 this am. Managed by transplant team.      #Electrolytes:  -mild hyponatremia of 131. Should improve with RRT  -normal serum potassium    #Acid Base status-AGMA likely secondary to FRANKLIN, AG adjusted for hypoalbuminemia is ~ 20. HCO3 is 17. Should improve with RRT     #Hypotension/Volume status:Acceptable BP.   -Plan to pull UF and Target 54 kg.     #Anemia  #Thrombocytopenia:  Hgb and Plt stable at this time.  Transfusion as per primary team    Recommendations were communicated to primary team via this note.     Seen and discussed with Dr. Samantha Shields MD  Nephrology Fellow  [phone]      Interval History :   Nursing and provider notes from last 24 hours reviewed.  Seen at HD. Patient denies SOB. Pain controlled. Making more urine. No fevers.    MEDICATIONS:  Current Meds    DULoxetine  20 mg Oral Daily     fluticasone  2 puff Inhalation Q12H     heparin lock flush  5-15 mL Intracatheter Q24H     insulin aspart  1-6 Units Subcutaneous Q4H     levothyroxine  100 mcg Oral Daily     lidocaine  1 patch Transdermal Q24H     lidocaine   Transdermal Q8H     lidocaine   Transdermal Q24h     LORazepam  0.5 mg Oral Once     melatonin  3 mg Oral At Bedtime     mesalamine  2,400 mg Oral BID     montelukast  10 mg Oral At Bedtime     multivitamin RENAL  1 capsule Oral Daily     mycophenolate  1,000 mg Oral BID IS     nystatin  500,000 Units Oral 4x Daily     pantoprazole  40 mg Oral BID     predniSONE  10 mg Oral QPM     predniSONE  15 mg Oral Daily     ranitidine  150 mg Oral Daily     rosuvastatin  10 mg Oral Daily     senna-docusate  1 tablet Oral BID    Or     senna-docusate  2 tablet Oral BID     sildenafil  20 mg Oral TID     sodium chloride (PF)  10 mL Intracatheter Q8H     sodium chloride (PF)  3 mL Intracatheter Q8H     sulfamethoxazole-trimethoprim  1 tablet Oral QPM     tacrolimus  1.5 mg Oral QAM     tacrolimus  2 mg Oral QPM     Infusion Meds    IV fluid REPLACEMENT ONLY       - MEDICATION INSTRUCTIONS -       BETA BLOCKER NOT PRESCRIBED       REVIEW OF SYSTEMS:  A comprehensive of systems was not obtained due to mental status.      PHYSICAL EXAM:   Temp  Av.9  F (36.6  C)  Min: 95.9  F (35.5  C)  Max: 100  F (37.8  C)  Arterial Line BP  Min: 68/56  Max: 182/36  Arterial Line BP 2  Min: 90/54  Max: 107/56  Arterial Line MAP (mmHg)  Av.7 mmHg  Min: 57 mmHg  Max: 123 mmHg Arterial Line  "Location 2: Right femoral    Pulse  Av.5  Min: 59  Max: 140 Resp  Av.1  Min: 8  Max: 45  FiO2 (%)  Av %  Min: 40 %  Max: 100 %  SpO2  Av.1 %  Min: 88 %  Max: 100 %    CVP (mmHg): 21 mmHg  /75 (BP Location: Left arm)   Pulse 82   Temp 98.1  F (36.7  C) (Oral)   Resp 16   Ht 1.727 m (5' 8\")   Wt 55.7 kg (122 lb 12.8 oz)   SpO2 95%   BMI 18.67 kg/m     Date 19 07 - 19 0659   Shift 3413-1019 0474-5598 5505-0040 24 Hour Total   INTAKE   P.O. 100   100   I.V. 70.4   70.4   Shift Total(mL/kg) 170.4(2.74)   170.4(2.74)   OUTPUT   Chest Tube(mL/kg) 238(3.83)   238(3.83)   Shift Total(mL/kg) 238(3.83)   238(3.83)   Weight (kg) 62.1 62.1 62.1 62.1      Admit Weight: 63.5 kg (140 lb 1.6 oz)     GENERAL APPEARANCE: Alert  Lymphatics: no cervical LAD  HEENT: NGT in place, sclerae anicteric  Pulmonary: lungs clear to auscultation with equal breath sounds bilaterally  CV: regular rhythm, normal rate   - Edema: None  GI: soft, nontender, normal bowel sounds.  SKIN: no rash, warm - R Chest Dressing in place  NEURO: face symmetric, AO x 3    LABS:   I personally reviewed the labs.     CMP  Recent Labs   Lab 19  0609 19  0728 19  0010 19  0450  19  0505   * 132* 130* 134   < > 132*   POTASSIUM 5.0 4.4 4.2 4.0   < > 4.1   CHLORIDE 96 98 99 98   < > 100   CO2 17* 19* 20 20   < > 20   ANIONGAP 18* 16* 11 15*   < > 11   * 107* 135* 254*   < > 200*   BUN 98* 89* 84* 58*   < > 52*   CR 3.00* 2.82* 2.66* 2.31*   < > 2.04*   GFRESTIMATED 22* 24* 26* 30*   < > 35*   GFRESTBLACK 26* 28* 30* 35*   < > 41*   RADAMES 8.4* 8.2* 7.8* 7.9*   < > 8.0*   MAG 2.5* 2.0 1.9 2.2   < > 2.1   PHOS 6.8* 5.5* 5.3* 4.9*   < > 3.0   PROTTOTAL  --   --   --   --   --  5.3*   ALBUMIN  --   --   --   --   --  2.6*   BILITOTAL  --   --   --   --   --  0.8   ALKPHOS  --   --   --   --   --  137   AST  --   --   --   --   --  36   ALT  --   --   --   --   --  57    < > = values in " this interval not displayed.     CBC  Recent Labs   Lab 03/23/19  0609 03/22/19  0728 03/22/19  0010 03/21/19  0710   HGB 7.7* 7.0* 7.1* 7.3*   WBC 7.6 7.5 8.6 10.5   RBC 2.51* 2.28* 2.36* 2.39*   HCT 24.7* 22.3* 23.3* 23.6*   MCV 98 98 99 99   MCH 30.7 30.7 30.1 30.5   MCHC 31.2* 31.4* 30.5* 30.9*   RDW 19.2* 18.9* 19.0* 18.6*   * 88* 82* 80*     INR  Recent Labs   Lab 03/23/19  0609 03/22/19  0728 03/22/19  0010 03/21/19  0710   INR 1.35* 1.37* 1.35* 1.33*     ABG  No lab results found in last 7 days.   URINE STUDIES  Recent Labs   Lab Test 03/17/19  0045 03/04/19  1303 02/23/19  1921 02/18/19  0957   COLOR Yellow Yellow Yellow Yellow   APPEARANCE Slightly Cloudy Clear Clear Clear   URINEGLC 30* Negative Negative Negative   URINEBILI Negative Negative Negative Negative   URINEKETONE Negative Negative Negative Negative   SG 1.016 1.014 1.008 1.008   UBLD Small* Negative Negative Negative   URINEPH 5.5 5.0 6.0 5.5   PROTEIN 100* Negative Negative Negative   NITRITE Negative Negative Negative Negative   LEUKEST Negative Negative Negative Negative   RBCU 14* 0 <1 <1   WBCU 15* 0 <1 1     No lab results found.  PTH  No lab results found.  IRON STUDIES  Recent Labs   Lab Test 11/15/18  0955 10/11/18  1235   IRON  --  63   FEB  --  457*   IRONSAT  --  14*   NATE 1,045*  --        IMAGING:  Reviewed

## 2019-03-24 NOTE — PROGRESS NOTES
Calorie Count  Intake recorded for: 3/23  Total Kcals: 1027 Total Protein: 42g  Kcals from Hospital Food: 1027   Protein: 42g  Kcals from Outside Food (average):0 Protein: 0g  # Meals Recorded: 100% brownie, 50% mac n cheese, 75% cream of wheat, 75% white toast with peanut butter  # Supplements Recorded: 100% 1 Boost Plus, 75% 1 Gelatein Plus

## 2019-03-25 ENCOUNTER — APPOINTMENT (OUTPATIENT)
Dept: OCCUPATIONAL THERAPY | Facility: CLINIC | Age: 54
DRG: 001 | End: 2019-03-25
Attending: INTERNAL MEDICINE
Payer: COMMERCIAL

## 2019-03-25 LAB
ANION GAP SERPL CALCULATED.3IONS-SCNC: 16 MMOL/L (ref 3–14)
BUN SERPL-MCNC: 78 MG/DL (ref 7–30)
CALCIUM SERPL-MCNC: 8 MG/DL (ref 8.5–10.1)
CHLORIDE SERPL-SCNC: 97 MMOL/L (ref 94–109)
CMV DNA SPEC NAA+PROBE-ACNC: NORMAL [IU]/ML
CMV DNA SPEC NAA+PROBE-LOG#: NORMAL {LOG_IU}/ML
CO2 SERPL-SCNC: 18 MMOL/L (ref 20–32)
CREAT SERPL-MCNC: 2.64 MG/DL (ref 0.66–1.25)
ERYTHROCYTE [DISTWIDTH] IN BLOOD BY AUTOMATED COUNT: 19.3 % (ref 10–15)
GFR SERPL CREATININE-BSD FRML MDRD: 26 ML/MIN/{1.73_M2}
GLUCOSE BLDC GLUCOMTR-MCNC: 123 MG/DL (ref 70–99)
GLUCOSE BLDC GLUCOMTR-MCNC: 137 MG/DL (ref 70–99)
GLUCOSE BLDC GLUCOMTR-MCNC: 272 MG/DL (ref 70–99)
GLUCOSE BLDC GLUCOMTR-MCNC: 88 MG/DL (ref 70–99)
GLUCOSE BLDC GLUCOMTR-MCNC: 94 MG/DL (ref 70–99)
GLUCOSE SERPL-MCNC: 115 MG/DL (ref 70–99)
HCT VFR BLD AUTO: 24.2 % (ref 40–53)
HGB BLD-MCNC: 7.6 G/DL (ref 13.3–17.7)
INR PPP: 1.41 (ref 0.86–1.14)
MAGNESIUM SERPL-MCNC: 2.3 MG/DL (ref 1.6–2.3)
MCH RBC QN AUTO: 30.4 PG (ref 26.5–33)
MCHC RBC AUTO-ENTMCNC: 31.4 G/DL (ref 31.5–36.5)
MCV RBC AUTO: 97 FL (ref 78–100)
PHOSPHATE SERPL-MCNC: 4.7 MG/DL (ref 2.5–4.5)
PLATELET # BLD AUTO: 135 10E9/L (ref 150–450)
POTASSIUM SERPL-SCNC: 3.4 MMOL/L (ref 3.4–5.3)
RBC # BLD AUTO: 2.5 10E12/L (ref 4.4–5.9)
SODIUM SERPL-SCNC: 132 MMOL/L (ref 133–144)
SPECIMEN SOURCE: NORMAL
TACROLIMUS BLD-MCNC: 8.4 UG/L (ref 5–15)
TME LAST DOSE: NORMAL H
WBC # BLD AUTO: 5 10E9/L (ref 4–11)

## 2019-03-25 PROCEDURE — 88346 IMFLUOR 1ST 1ANTB STAIN PX: CPT | Performed by: INTERNAL MEDICINE

## 2019-03-25 PROCEDURE — 85610 PROTHROMBIN TIME: CPT | Performed by: THORACIC SURGERY (CARDIOTHORACIC VASCULAR SURGERY)

## 2019-03-25 PROCEDURE — 25000131 ZZH RX MED GY IP 250 OP 636 PS 637: Performed by: INTERNAL MEDICINE

## 2019-03-25 PROCEDURE — 00000146 ZZHCL STATISTIC GLUCOSE BY METER IP

## 2019-03-25 PROCEDURE — 25000132 ZZH RX MED GY IP 250 OP 250 PS 637: Performed by: ANESTHESIOLOGY

## 2019-03-25 PROCEDURE — 25000132 ZZH RX MED GY IP 250 OP 250 PS 637: Performed by: PHYSICIAN ASSISTANT

## 2019-03-25 PROCEDURE — 25000132 ZZH RX MED GY IP 250 OP 250 PS 637: Performed by: INTERNAL MEDICINE

## 2019-03-25 PROCEDURE — 83735 ASSAY OF MAGNESIUM: CPT | Performed by: THORACIC SURGERY (CARDIOTHORACIC VASCULAR SURGERY)

## 2019-03-25 PROCEDURE — 36592 COLLECT BLOOD FROM PICC: CPT | Performed by: THORACIC SURGERY (CARDIOTHORACIC VASCULAR SURGERY)

## 2019-03-25 PROCEDURE — 25000132 ZZH RX MED GY IP 250 OP 250 PS 637: Performed by: HOSPITALIST

## 2019-03-25 PROCEDURE — 21400000 ZZH R&B CCU UMMC

## 2019-03-25 PROCEDURE — 25000128 H RX IP 250 OP 636: Performed by: PHYSICIAN ASSISTANT

## 2019-03-25 PROCEDURE — 25000132 ZZH RX MED GY IP 250 OP 250 PS 637: Performed by: STUDENT IN AN ORGANIZED HEALTH CARE EDUCATION/TRAINING PROGRAM

## 2019-03-25 PROCEDURE — 93505 ENDOMYOCARDIAL BIOPSY: CPT | Mod: 26 | Performed by: INTERNAL MEDICINE

## 2019-03-25 PROCEDURE — 80197 ASSAY OF TACROLIMUS: CPT | Performed by: THORACIC SURGERY (CARDIOTHORACIC VASCULAR SURGERY)

## 2019-03-25 PROCEDURE — 25000132 ZZH RX MED GY IP 250 OP 250 PS 637: Performed by: SURGERY

## 2019-03-25 PROCEDURE — 02BM3ZX EXCISION OF VENTRICULAR SEPTUM, PERCUTANEOUS APPROACH, DIAGNOSTIC: ICD-10-PCS | Performed by: INTERNAL MEDICINE

## 2019-03-25 PROCEDURE — 25000125 ZZHC RX 250: Performed by: INTERNAL MEDICINE

## 2019-03-25 PROCEDURE — 97535 SELF CARE MNGMENT TRAINING: CPT | Mod: GO

## 2019-03-25 PROCEDURE — 99232 SBSQ HOSP IP/OBS MODERATE 35: CPT | Mod: GC | Performed by: INTERNAL MEDICINE

## 2019-03-25 PROCEDURE — 25000132 ZZH RX MED GY IP 250 OP 250 PS 637: Performed by: NURSE PRACTITIONER

## 2019-03-25 PROCEDURE — 88307 TISSUE EXAM BY PATHOLOGIST: CPT | Performed by: INTERNAL MEDICINE

## 2019-03-25 PROCEDURE — 84100 ASSAY OF PHOSPHORUS: CPT | Performed by: THORACIC SURGERY (CARDIOTHORACIC VASCULAR SURGERY)

## 2019-03-25 PROCEDURE — 27210437 ZZH NUTRITION PRODUCT SEMIELEM INTERMED LITER

## 2019-03-25 PROCEDURE — 25000131 ZZH RX MED GY IP 250 OP 636 PS 637: Performed by: HOSPITALIST

## 2019-03-25 PROCEDURE — 88350 IMFLUOR EA ADDL 1ANTB STN PX: CPT | Performed by: INTERNAL MEDICINE

## 2019-03-25 PROCEDURE — 85027 COMPLETE CBC AUTOMATED: CPT | Performed by: THORACIC SURGERY (CARDIOTHORACIC VASCULAR SURGERY)

## 2019-03-25 PROCEDURE — 97110 THERAPEUTIC EXERCISES: CPT | Mod: GO

## 2019-03-25 PROCEDURE — 93505 ENDOMYOCARDIAL BIOPSY: CPT | Performed by: INTERNAL MEDICINE

## 2019-03-25 PROCEDURE — 80048 BASIC METABOLIC PNL TOTAL CA: CPT | Performed by: THORACIC SURGERY (CARDIOTHORACIC VASCULAR SURGERY)

## 2019-03-25 PROCEDURE — C1894 INTRO/SHEATH, NON-LASER: HCPCS | Performed by: INTERNAL MEDICINE

## 2019-03-25 PROCEDURE — 40000802 ZZH SITE CHECK

## 2019-03-25 PROCEDURE — 27210794 ZZH OR GENERAL SUPPLY STERILE: Performed by: INTERNAL MEDICINE

## 2019-03-25 RX ORDER — POTASSIUM CHLORIDE 29.8 MG/ML
20 INJECTION INTRAVENOUS
Status: DISCONTINUED | OUTPATIENT
Start: 2019-03-25 | End: 2019-04-03 | Stop reason: HOSPADM

## 2019-03-25 RX ORDER — POTASSIUM CHLORIDE 750 MG/1
20-40 TABLET, EXTENDED RELEASE ORAL
Status: DISCONTINUED | OUTPATIENT
Start: 2019-03-25 | End: 2019-04-03 | Stop reason: HOSPADM

## 2019-03-25 RX ORDER — POTASSIUM CHLORIDE 7.45 MG/ML
10 INJECTION INTRAVENOUS
Status: DISCONTINUED | OUTPATIENT
Start: 2019-03-25 | End: 2019-04-03 | Stop reason: HOSPADM

## 2019-03-25 RX ORDER — SULFAMETHOXAZOLE/TRIMETHOPRIM 800-160 MG
1 TABLET ORAL DAILY
Status: DISCONTINUED | OUTPATIENT
Start: 2019-03-29 | End: 2019-03-25

## 2019-03-25 RX ORDER — VALGANCICLOVIR 450 MG/1
450 TABLET, FILM COATED ORAL
Status: DISCONTINUED | OUTPATIENT
Start: 2019-03-25 | End: 2019-04-01

## 2019-03-25 RX ORDER — LANOLIN ALCOHOL/MO/W.PET/CERES
3-9 CREAM (GRAM) TOPICAL AT BEDTIME
Status: DISCONTINUED | OUTPATIENT
Start: 2019-03-26 | End: 2019-04-03 | Stop reason: HOSPADM

## 2019-03-25 RX ORDER — POTASSIUM CHLORIDE 1.5 G/1.58G
20-40 POWDER, FOR SOLUTION ORAL
Status: DISCONTINUED | OUTPATIENT
Start: 2019-03-25 | End: 2019-04-03 | Stop reason: HOSPADM

## 2019-03-25 RX ORDER — PENTAMIDINE ISETHIONATE 300 MG/300MG
300 INHALANT RESPIRATORY (INHALATION)
Status: DISCONTINUED | OUTPATIENT
Start: 2019-04-01 | End: 2019-03-26

## 2019-03-25 RX ORDER — POTASSIUM CL/LIDO/0.9 % NACL 10MEQ/0.1L
10 INTRAVENOUS SOLUTION, PIGGYBACK (ML) INTRAVENOUS
Status: DISCONTINUED | OUTPATIENT
Start: 2019-03-25 | End: 2019-04-03 | Stop reason: HOSPADM

## 2019-03-25 RX ORDER — SULFAMETHOXAZOLE/TRIMETHOPRIM 800-160 MG
1 TABLET ORAL DAILY
Status: DISCONTINUED | OUTPATIENT
Start: 2019-03-26 | End: 2019-04-03 | Stop reason: HOSPADM

## 2019-03-25 RX ADMIN — MELATONIN TAB 3 MG 3 MG: 3 TAB at 20:22

## 2019-03-25 RX ADMIN — BENZOCAINE, MENTHOL 1 LOZENGE: 15; 3.6 LOZENGE ORAL at 02:10

## 2019-03-25 RX ADMIN — Medication 500000 UNITS: at 17:36

## 2019-03-25 RX ADMIN — SENNOSIDES AND DOCUSATE SODIUM 2 TABLET: 8.6; 5 TABLET ORAL at 10:25

## 2019-03-25 RX ADMIN — PANTOPRAZOLE SODIUM 40 MG: 40 TABLET, DELAYED RELEASE ORAL at 20:22

## 2019-03-25 RX ADMIN — Medication 500000 UNITS: at 12:02

## 2019-03-25 RX ADMIN — TACROLIMUS 2 MG: 1 CAPSULE ORAL at 17:35

## 2019-03-25 RX ADMIN — VALGANCICLOVIR 450 MG: 450 TABLET, FILM COATED ORAL at 21:23

## 2019-03-25 RX ADMIN — RANITIDINE 150 MG: 150 TABLET ORAL at 10:24

## 2019-03-25 RX ADMIN — Medication 500000 UNITS: at 20:22

## 2019-03-25 RX ADMIN — PANTOPRAZOLE SODIUM 40 MG: 40 TABLET, DELAYED RELEASE ORAL at 10:25

## 2019-03-25 RX ADMIN — MESALAMINE 2400 MG: 800 TABLET, DELAYED RELEASE ORAL at 10:23

## 2019-03-25 RX ADMIN — MONTELUKAST SODIUM 10 MG: 10 TABLET, COATED ORAL at 21:23

## 2019-03-25 RX ADMIN — ACETAMINOPHEN 650 MG: 325 TABLET, FILM COATED ORAL at 20:21

## 2019-03-25 RX ADMIN — LIDOCAINE 1 PATCH: 560 PATCH PERCUTANEOUS; TOPICAL; TRANSDERMAL at 10:27

## 2019-03-25 RX ADMIN — ROSUVASTATIN CALCIUM 10 MG: 10 TABLET, FILM COATED ORAL at 10:24

## 2019-03-25 RX ADMIN — MYCOPHENOLATE MOFETIL 1000 MG: 500 TABLET, FILM COATED ORAL at 17:35

## 2019-03-25 RX ADMIN — FLUTICASONE PROPIONATE 2 PUFF: 220 AEROSOL, METERED RESPIRATORY (INHALATION) at 10:26

## 2019-03-25 RX ADMIN — LEVOTHYROXINE SODIUM 100 MCG: 100 TABLET ORAL at 10:26

## 2019-03-25 RX ADMIN — MYCOPHENOLATE MOFETIL 1000 MG: 500 TABLET, FILM COATED ORAL at 10:25

## 2019-03-25 RX ADMIN — Medication 5 ML: at 06:08

## 2019-03-25 RX ADMIN — SILDENAFIL 20 MG: 20 TABLET ORAL at 20:22

## 2019-03-25 RX ADMIN — CALCIUM CARBONATE (ANTACID) CHEW TAB 500 MG 500 MG: 500 CHEW TAB at 12:01

## 2019-03-25 RX ADMIN — DULOXETINE 20 MG: 20 CAPSULE, DELAYED RELEASE ORAL at 10:25

## 2019-03-25 RX ADMIN — Medication 500000 UNITS: at 10:25

## 2019-03-25 RX ADMIN — SILDENAFIL 20 MG: 20 TABLET ORAL at 10:35

## 2019-03-25 RX ADMIN — PREDNISONE 15 MG: 10 TABLET ORAL at 10:24

## 2019-03-25 RX ADMIN — ACETAMINOPHEN 650 MG: 325 TABLET, FILM COATED ORAL at 02:10

## 2019-03-25 RX ADMIN — Medication 5 ML: at 20:26

## 2019-03-25 RX ADMIN — Medication 1 CAPSULE: at 10:25

## 2019-03-25 RX ADMIN — TACROLIMUS 2 MG: 1 CAPSULE ORAL at 10:24

## 2019-03-25 RX ADMIN — MESALAMINE 2400 MG: 800 TABLET, DELAYED RELEASE ORAL at 20:22

## 2019-03-25 RX ADMIN — SILDENAFIL 20 MG: 20 TABLET ORAL at 17:35

## 2019-03-25 RX ADMIN — Medication 2.5 MG: at 02:10

## 2019-03-25 RX ADMIN — PREDNISONE 10 MG: 10 TABLET ORAL at 20:22

## 2019-03-25 ASSESSMENT — ACTIVITIES OF DAILY LIVING (ADL)
ADLS_ACUITY_SCORE: 14

## 2019-03-25 ASSESSMENT — MIFFLIN-ST. JEOR: SCORE: 1384.65

## 2019-03-25 NOTE — PLAN OF CARE
OT 6C  Discharge Planner OT   Patient plan for discharge: rehab   Current status: pt completing bed mobility SBA; requiring min A for STS from EOB and mod A from toilet with additional min-mod A once standing due to anterior LOB. CGA for functional mobility within room using FWW. Min A for clothing management and IND with pericares for toileting. Pt tolerating 10 continuous minutes on LE ergometer at light resistance; VSS.  Barriers to return to prior living situation: weakness, fatigue, impaired cognition, medical status  Recommendations for discharge: ARU  Rationale for recommendations: Pt will benefit form further rehab to progress functional IND. Pt previously living alone and IND with ADLs.     Entered by: Carla Alejo 03/25/2019 1:28 PM

## 2019-03-25 NOTE — PROGRESS NOTES
Calorie Count  Intake recorded for: 3/24  Total Kcals: 1105 Total Protein: 45g  Kcals from Hospital Food: 1105  Protein: 45g  Kcals from Outside Food (average):0 Protein: 0g  # Meals Recorded: 2 meals (First - 50% 1 Thai toast w/ butter & syrup, coffee)      (Second - 25% 2 pancakes w/ butter & syrup, peaches)  # Supplements Recorded: 100% 1 Boost Breeze, 1 Magic Cup, Gelatein Plus

## 2019-03-25 NOTE — PROGRESS NOTES
CVTS Daily Note  3/25/2019  Attending: Dr House    S:   No new issues, patient's SOB improving. +BM x 2. Pain controlled. Jamey drain still draining, no hematoma. Got 1 unit of PRBC yesterday. Feeling good, its [birth year] today. RHC this afternoon.     O:   Vitals:    03/25/19 0717 03/25/19 0741 03/25/19 0800 03/25/19 1114   BP: 117/77 112/73  126/87   BP Location: Left arm Left arm  Left arm   Pulse:   90    Resp: 18 18 18   Temp: 98.7  F (37.1  C) 98.7  F (37.1  C)  97.4  F (36.3  C)   TempSrc: Oral   Oral   SpO2: 100% 99%  98%   Weight:       Height:         Vitals:    03/23/19 1200 03/23/19 1800 03/25/19 0300   Weight: 56.3 kg (124 lb 1.6 oz) 55.7 kg (122 lb 12.8 oz) 58 kg (127 lb 14.4 oz)           Gen: up in chair, comfortable, -O2, +TF (not running)  CV: RRR, telemetry SR 80s,  Trace 1+ pedal edema LE  Pulm: CTA  Abd: BS+, ND, minimal tenderness on right   Jamey drain to right pacer pocket with 75 ml yesterday, continue to bulb.     Labs:  BMP  Recent Labs   Lab 03/25/19  0612 03/24/19  0552 03/23/19  0609 03/22/19  0728   * 132* 131* 132*   POTASSIUM 3.4 3.8 5.0 4.4   CHLORIDE 97 97 96 98   RADAMES 8.0* 8.0* 8.4* 8.2*   CO2 18* 24 17* 19*   BUN 78* 68* 98* 89*   CR 2.64* 2.07* 3.00* 2.82*   * 98 100* 107*     CBC  Recent Labs   Lab 03/25/19  0612 03/24/19  0705 03/24/19  0552 03/23/19  0609 03/22/19  0728   WBC 5.0  --  5.0 7.6 7.5   RBC 2.50*  --  2.24* 2.51* 2.28*   HGB 7.6* 6.9* 6.8* 7.7* 7.0*   HCT 24.2*  --  22.1* 24.7* 22.3*   MCV 97  --  99 98 98   MCH 30.4  --  30.4 30.7 30.7   MCHC 31.4*  --  30.8* 31.2* 31.4*   RDW 19.3*  --  19.6* 19.2* 18.9*   *  --  115* 114* 88*     INR  Recent Labs   Lab 03/25/19  0612 03/24/19  0552 03/23/19  0609 03/22/19  0728   INR 1.41* 1.40* 1.35* 1.37*      Hepatic Panel   Lab Results   Component Value Date    AST 36 03/18/2019     Lab Results   Component Value Date    ALT 57 03/18/2019     Lab Results   Component Value Date    ALBUMIN 2.6  03/18/2019     GLUCOSE:   Recent Labs   Lab 03/25/19  1120 03/25/19  0612 03/25/19  0502 03/25/19  0008 03/24/19  2122 03/24/19  1711 03/24/19  0552 03/24/19  0441  03/23/19  0609  03/22/19  0728  03/22/19  0010  03/20/19  0450   GLC  --  115*  --   --   --   --  98  --   --  100*  --  107*  --  135*  --  254*   BGM 88  --  123* 137* 224* 191*  --  108*   < >  --    < >  --    < >  --    < >  --     < > = values in this interval not displayed.     Culture Micro (Abnormal) 03/20/2019  4:30    Abnormal   Light growth   Klebsiella oxytoca   Susceptibility testing in progress          Imaging:    3/21/19  7:55 PM [id number] [facility], [place],  Radiology    PACS Images      Show images for XR Chest 2 Views       Imaging: reviewed     A/P:  Everton Larios is a 53 year old male with PMH etoh abuse, hypothyroidism, intermittent asthma, ulcerative colitis, Depression, Chronic HFrEF, NICM admitted with cardiogenic shock requiring subclavian balloon pump. He is now s/p OHT 2/24/19 with Piyush House and Christopher.    - RHC and biopsy 3/4; 0R, no AMR  - RHC and biopsy 3/11; 0R, no AMR  - RHC and biopsy 3/18; 1R, no AMR   - RHC and biopsy pending today   Neuro:   - Neuro intact. ICU delirium improving. Head CT 3/9 without acute pathology.   - Hx Depression: pta cymbalta  - Sleep: melatonin   - Tylenol and oxycodone  -  tramadol      CV:   - Hx of NICM, now S/P heart transplant 2/24  - No arrhythmia, HD stable.  - ASA 81 mg, Crestor  - Dobutamine 2.5 mcg/kg/min for mild decreased RV function-now off. Sildenafil 20 mg TID.      Pulm:   - Hx intermittent asthma, pta Singulair.   - Pulm toilet, IS, activity and deep breathing   - Supplemental O2 PRN to keep sats > 92%. Wean off as tolerated.  - LLL consolidation, albuterol and mucomyst Nebs today x 2, assess response.   - Sputum culture showing lactose fermenting gram neg rods, now showing strain of Klebsiella- zosyn started 3/21, will changed to bactrim with sensitivities, ID  consulted-continue Bactrim         ID:   - WBC 5.0, afebrile, no signs or symptoms of infection other than SOB. Completed 7 days cefepime/zosyn 3/16.   - Blood cultures 3/13: NGTD. C diff negative 3/17.    - Sputum culture positive 3/20, bactrim, ID consulted, continue bactrim for klebsiella pneumonia. Discuss duration with ID.       GI / FEN:  - Reg diet, bowel regimen. Calorie counts. Na 132  - Tube feeds were off 3/21 for nausea. New formula 3/22 pm.   - Hx ulcerative colitis; pta mesalamine. Stool occult negative 3/20.  - +BM  - Increased PPI to bid for GERD  - remove NJ and stop tube feeding, patient refusing TF since 3/20. Taking in 1100 calories.      Renal / :   - Creatinine 2.64, adequate UOP  - Nephrology following for oliguria and T/Th/Sa hemodialysis. Unable to get tunneled dialysis catheter due to lena drain to old right pacer pocket. RIJ dialysis catheter no longer functional and dialysis unable to be performed 3/21. Patient is showing signs of renal recovery but still not enough to go completely without dialysis. RIJ dialysis catheter done on 3/22     Heme / Anticoagulation:  - Got 1 unit PRBC for Hgb 6.9 on 3/24, hgb 7.6 today. No signs of bleeding.   - Will need coumadin eventually for SVC graft per Dr House. Will need to discuss with Cardiology, may prefer Apixaban due to frequent biopsies.    - No biopsies or central line access on left due to SVC graft.   - Holding Hep gtt since 3/18. Has R chest hematoma shouldn't interfere with RIGHT chest HD tunneled catheter placement. Chest CT 3/19 confirms R chest hematoma at old AICD site, s/p drainage in OR 3/21.   Endo:   - Hypothyroid: levothyroxine 100 mcg daily   - Sliding corrective scale qid      PPX:   - Protonix     Dispo:   - 6C since 3/16    Seen and discussed with staff     Sheila Ann PA-C  Cardiothoracic Surgery  Pager [phone]  March 25, 2019

## 2019-03-25 NOTE — SUMMARY OF CARE
-Pt remained NPO for a right sided heart catheterization procedure.  -Pt returned from procedure and completed bed rest and frequent vital signs per protocol.  -Pt insertion sight remained bruised in the same amount from return to the unit. There was no bleeding or swelling to report.

## 2019-03-25 NOTE — PLAN OF CARE
D: Pt admitted 2/17 with cardiogenic shock, s/p OHT 2/24. PMH: ETOH, hypothyroidism, asthma, ulcerative colitis, depression, NICM.     I/A: A&Ox4. Vital signs stable on RA. Monitor shows sinus rhythm, HR 70's-80's. Abdominal pain managed with PRN Tylenol and oxycodone. R Midline heparin locked. TIFFANY drain to bulb suction with bright red output. Dressing CDI. Pt's appetite is improving, however still refusing tube feeding. Calorie counts through 3/25 PM. Blood sugars managed with sliding scale insulin. Voiding with adequate output. +BM overnight. Up with assist x2. Appeared to sleep well between cares, making needs known.      P: Continue to monitor. Notify CVTS with changes/concerns.

## 2019-03-25 NOTE — PROGRESS NOTES
CLINICAL NUTRITION SERVICES - REASSESSMENT NOTE     Nutrition Prescription    RECOMMENDATIONS FOR MDs/PROVIDERS TO ORDER:  If consuming at least 1200 kcals and 60 g protein daily, then rec pull feeding tube and discontinue TF order.     Malnutrition Status:    Severe malnutrition in the context of chronic illness    Recommendations already ordered by Registered Dietitian (RD):  Modified oral supplement  Kcal counts    Future/Additional Recommendations:  1. Once post-procedure, resume regular diet and encourage oral intake. Rec small, frequent meals and high protein/kcal options as tolerated.    2. Continue fluid restriction as per team.   3. Continue triphrocaps/nephrocaps for micronutrient supplementation as pt is on dialysis.   4. If GI sx worsen, then consider a GI consult.   5. If appropriate with dialysis, order calcium/vitamin D vs vitamin D before discharge as pt is on prednisone.  6. Monitor phos trends and potential need for phos binder. Avoid diet restriction at this time.   7. Monitor BG control. Hgb A1c of 5.7 on 2/23/19.      EVALUATION OF THE PROGRESS TOWARD GOALS   Diet: NPO currently (heart cath and bx). Previously on a regular diet and on a 2 L fluid restriction. Has orders for any flavor of Boost Breeze at 10:00 and 14:00 and cherry Gelatein Plus at 10:00 and HS.   Intake: Tolerating diet. Flowsheets indicate pt consuming 100% of meals 3/18, % of meals with a good appetite 3/20, 50% of meals with a fair appetite 3/21, % of meals with a poor to good appetite 3/22, 50-75% of meals with a fair to good appetite 3/23, and 25-75% of meals with a fair to good appetite 3/24. RN stated pt with increasing appetite 3/24. Per discussion with pt today (3/25), no nausea and states his abdominal pain is minimal. Feels full if he eats too much at a time. Admits he is having heartburn, but this seems controlled. Likes the orange Boost Breeze and cherry Gelatein Plus. Pt states he is eating more with the  "TFs being off. However, he is NPO frequently for tests/procedures.    Kcal counts:    3/19    One meal ordered through room service, no food intake recorded               3/20    924 kcals and 48 g protein (100% cookie, 50% mashed potatoes, cottage cheese, 100% 1 Gelatein Plus, and 100% 2 Boost Breeze supplement/s recorded)               3/21    586 kcals and 24 g protein (100% ice cream, 50% omelet w/ ham & cheese, Portuguese toast w/ syrup and no supplement/s recorded)    3/23   1027 kcals and 42 g protein (100% brownie, 50% mac n cheese, 75% cream of wheat, 75% white toast with peanut butter; 100% of one Boost Plus; and 75% of one Gelatein Plus meal/s recorded)    3/24   1105 kcals and 45 g protein (Two meal/s and 100% 1 Boost Breeze, 1 Magic Cup, Gelatein Plus recorded)    3/25    Pending    * Pt consumed a four-day average of 911 kcals and 40 g protein daily which does not meet estimated needs of 9726-6504+ kcals/day (30 - 35+ kcals/kg) and  grams protein/day (1.5 - 2 grams of pro/kg). Kcal counts improved from a week ago.       Nutrition Support:  - Feeding Tube (FT) access: NDT (cortrak) + bridle placed on 3/11.   - TF orders: Peptamen 1.5 (8p-6a) to provide 600 mL TF, 900 kcals (16 kcals/kg), 41 g protein (0.7 g protein/kg), 462 mL H2O, 113 g CHO, and no fiber daily.  - Feeding Tube Flushes: 60 mL before and after TF cycle.  - Intake: TF not infusing but feeding tube is in place. Per flowsheets, last received TFs 3/20. He stated fiber does not always go well for him. Pt states he is feeling better, and no TFs are infusing.       NEW FINDINGS   [facility] nurse 3/22: \"Coccyx, sacrum and buttocks assessed.  Skin is very quickly blanchable.  Sacral mepilex in place for prevention.  No pressure injury at this time. Noted NJ tube taped to face with bridle clip resting on nare.  Removed tegaderm and assessed left nare to find no pressure injury.\"   Skin beneath bridle inspected. Appears appropriate with no skin " breakdown or bridle string tension.    Wt hx: 84.1 kg (1/20/16), 74.8 kg (4/2/18), 72.5 kg (10/11/18), 64.4 kg (12/19/18), 65 kg (1/30/19), 63.5 kg (2/17/19, admit), 58 kg (3/25) - Pt has lost 20% of his body wt in the past approximate five months.     MALNUTRITION  % Intake: < 75% for > 7 days (non-severe)  % Weight Loss: > 10% in 6 months (severe)  Subcutaneous Fat Loss: Facial region:  Mild  Muscle Loss: Thoracic region (clavicle, acromium bone, deltoid, trapezius, pectoral): Moderate; Temporal: Mild  Fluid Accumulation/Edema: None noted  Malnutrition Diagnosis: Severe malnutrition in the context of chronic illness    Previous Goals   Total avg nutritional intake to meet a minimum of 30 kcal/kg and 1.5 g PRO/kg daily (per dosing wt 55 kg).  Evaluation: Not met    Previous Nutrition Diagnosis  Inadequate protein-energy intake related to frequent interruptions to TF infusion, decreased appetite, abd discomfort and early satiety as evidenced by intake only meeting 26 kcal/kg and 1 gm protein/kg.  Evaluation: Unresolved. Changed to new nutrition dx below.     CURRENT NUTRITION DIAGNOSIS  Inadequate oral intake related to nausea, abdominal pain, heartburn, bloating, and NPO frequently for tests/procedures as evidenced by pt consumed a four-day average of 911 kcals and 40 g protein daily which does not meet estimated needs of 2174-0212+ kcals/day (30 - 35+ kcals/kg) and  grams protein/day (1.5 - 2 grams of pro/kg). Kcal counts improved from a week ago.      INTERVENTIONS  Implementation  Ordered kcal counts 3/26-3/28.  Medical food supplement therapy: Changed Boost Breeze to orange only, as per pt request.  Modify composition of meals/snacks: Placed order to allow pt to order small, frequent meals.   Nutrition education for nutrition relationship to health/disease: Discussed importance of adequate nutrition intake. Reviewed his estimated needs and gave results of his recent kcal counts. Encouraged protein intake  and gave examples. Provided ideas of items he may want to order. Pt plans to try to eat small, frequent meals as he states he may not feel full/bloated. Rec he try to select well-tolerated foods.   Enteral Nutrition: Explained the TF formula that he was receiving, Peptamen 1.5, does not have fiber.     Goals  Patient to consume % of nutritionally adequate meal trays TID, or the equivalent with supplements/snacks.    Monitoring/Evaluation  Progress toward goals will be monitored and evaluated per protocol.     Nutrition will continue to follow.      Jannie Guerrero, MS, RD, LD, [facility]   6C Pgr: [phone]

## 2019-03-25 NOTE — PROGRESS NOTES
SUBJECTIVE:  No acute events overnight.  Feeling okay this AM.  Abdominal pain intermittent.    ROS otherwise negative.    OBJECTIVE:  Vital signs:  Temp: 97.2  F (36.2  C) Temp  Min: 97.1  F (36.2  C)  Max: 98.5  F (36.9  C)  Heart Rate: 87 Heart Rate  Min: 81  Max: 88  BP: 122/81 Systolic (24hrs), Av , Min:97 , Max:135   Diastolic (24hrs), Av, Min:77, Max:91    Resp: 18 Resp  Min: 18  Max: 18  SpO2: 98 % SpO2  Min: 98 %  Max: 100 %    Intake/Output Summary (Last 24 hours) at 3/25/2019 0704  Last data filed at 3/25/2019 0608  Gross per 24 hour   Intake 1020.08 ml   Output 525 ml   Net 495.08 ml       Vitals:    19 1200 19 1800 19 0300   Weight: 56.3 kg (124 lb 1.6 oz) 55.7 kg (122 lb 12.8 oz) 58 kg (127 lb 14.4 oz)     Physical Exam:  GEN:  no distress, lying in bed  CV:  Regular rate and rhythm, no murmur.   CHEST: Subclavian incisions sutured.  Dressed right chest hematoma site.  LUNGS:  normal work of breathing, Clear to auscultation bilaterally, no wheezes or crackles.   ABD:  hyperactive bowel sounds, soft, mild RLQ tenderness, nondistended  EXT:  No edema or cyanosis. Normal distal puses  NEURO: no focal deficits    Medications:    IV fluid REPLACEMENT ONLY       - MEDICATION INSTRUCTIONS -       BETA BLOCKER NOT PRESCRIBED         Current Facility-Administered Medications   Medication Dose Route Frequency     DULoxetine  20 mg Oral Daily     fluticasone  2 puff Inhalation Q12H     heparin lock flush  5-15 mL Intracatheter Q24H     insulin aspart  1-6 Units Subcutaneous Q4H     levothyroxine  100 mcg Oral Daily     lidocaine  1 patch Transdermal Q24H     lidocaine   Transdermal Q8H     lidocaine   Transdermal Q24h     LORazepam  0.5 mg Oral Once     melatonin  3 mg Oral At Bedtime     mesalamine  2,400 mg Oral BID     montelukast  10 mg Oral At Bedtime     multivitamin RENAL  1 capsule Oral Daily     mycophenolate  1,000 mg Oral BID IS     nystatin  500,000 Units Oral 4x Daily      pantoprazole  40 mg Oral BID     predniSONE  10 mg Oral QPM     predniSONE  15 mg Oral Daily     ranitidine  150 mg Oral Daily     rosuvastatin  10 mg Oral Daily     senna-docusate  1 tablet Oral BID    Or     senna-docusate  2 tablet Oral BID     sildenafil  20 mg Oral TID     sodium chloride (PF)  10 mL Intracatheter Q8H     sodium chloride (PF)  3 mL Intracatheter Q8H     sulfamethoxazole-trimethoprim  1 tablet Oral QPM     tacrolimus  2 mg Oral QAM     tacrolimus  2 mg Oral QPM     Labs:  CMP  Recent Labs   Lab 03/25/19  0612 03/24/19  0552 03/23/19  0609   * 132* 131*   POTASSIUM 3.4 3.8 5.0   CHLORIDE 97 97 96   CO2 18* 24 17*   BUN 78* 68* 98*   CR 2.64* 2.07* 3.00*   RADAMES 8.0* 8.0* 8.4*   MAG 2.3 2.3 2.5*   PHOS  --  4.7* 6.8*   * 98 100*     CBC  Recent Labs   Lab 03/25/19  0612 03/24/19  0705 03/24/19  0552   WBC 5.0  --  5.0   HGB 7.6* 6.9* 6.8*   HCT 24.2*  --  22.1*   *  --  115*     COAG  Recent Labs   Lab 03/25/19  0612 03/24/19  0552   INR 1.41* 1.40*       ASSESSMENT/PLAN:  Everton Larios is a 54 yo gentleman who underwent orthotopic heart transplant 2/24/19 for non ischemic cardiomyopathy and cardiogenic shock. Post-op complicated by RV dysfunction and FRANKLIN.    Updates today  - follow up tacro dosing     #Oliguric renal failure:  Etiology cardiorenal from venous congestion vs. nephrotoxins.  - iHD per renal  - nephrology consulted, appreciate assistance  - no tunneled line in place for now, would need to be placed on the right side, will continue monitoring for renal recovery.  Will either need tunneled line once able on right or await renal recovery.    #ID  - completed antibiotics cefepime/zosyn for 7 day course (ending 3/16/19)  - sputum cultures growing Klebsiella  - on Zosyn since 3/22  - will consult transplant ID for antibiotic recommendations     #S/p OHT on 2/24:  Graft function:  - echo 3/1 shows normal LV function, RV is mild to moderately dilated and  hypokinetic  - continue sildenafil 20 q8h     Immunosuppression:  - on MMF decreased at 1000 BID  - Prednisone 15mg BID, decreasing by 5mg daily with negative biopsies  - NOT on Simulect protocol  - tacrolimus 2 mg BID (goal 8-10)     Prophylaxis:  - CMV +/+: medium risk, Valcyte restarting at renal dosing, will check weekly CMV titers  - PCP: pentamadine given 3/3, Bactrim  - Thrush: improved on nystatin  - GI: on PPI  - CAV: Crestor, holding ASA given bleeding at line sites    Biopsies:  - First biopsy 3/4, 0R, no AMR  - Second biopsy 3/11, 0R, no AMR  - Third biopsy 3/18, 1R, no AMR  - Fourth biopsy due 3/25: needs femoral access     Goretex conduit for persistent left SVC:  **NO CENTRAL VENOUS CATHETERS ON THE LEFT UPPER BODY**      #Thrombocytopenia/Anemia:   - Improved plts, hgb borderline     Seen and staffed with .    Santino Haque MD  Cardiology Fellow  I personally saw and examined this patient, reviewed imaging and laboratory studies, confirmed physical examination and discussed results and plan with patient and or family.

## 2019-03-26 ENCOUNTER — APPOINTMENT (OUTPATIENT)
Dept: CARDIOLOGY | Facility: CLINIC | Age: 54
DRG: 001 | End: 2019-03-26
Attending: NURSE PRACTITIONER
Payer: COMMERCIAL

## 2019-03-26 ENCOUNTER — APPOINTMENT (OUTPATIENT)
Dept: ULTRASOUND IMAGING | Facility: CLINIC | Age: 54
DRG: 001 | End: 2019-03-26
Attending: PHYSICIAN ASSISTANT
Payer: COMMERCIAL

## 2019-03-26 ENCOUNTER — APPOINTMENT (OUTPATIENT)
Dept: PHYSICAL THERAPY | Facility: CLINIC | Age: 54
DRG: 001 | End: 2019-03-26
Attending: INTERNAL MEDICINE
Payer: COMMERCIAL

## 2019-03-26 LAB
ALBUMIN SERPL-MCNC: 2.4 G/DL (ref 3.4–5)
ALBUMIN UR-MCNC: 30 MG/DL
ALP SERPL-CCNC: 110 U/L (ref 40–150)
ALT SERPL W P-5'-P-CCNC: 23 U/L (ref 0–70)
ANION GAP SERPL CALCULATED.3IONS-SCNC: 15 MMOL/L (ref 3–14)
APPEARANCE UR: CLEAR
AST SERPL W P-5'-P-CCNC: 16 U/L (ref 0–45)
BILIRUB DIRECT SERPL-MCNC: 0.4 MG/DL (ref 0–0.2)
BILIRUB SERPL-MCNC: 0.5 MG/DL (ref 0.2–1.3)
BILIRUB UR QL STRIP: NEGATIVE
BUN SERPL-MCNC: 74 MG/DL (ref 7–30)
CALCIUM SERPL-MCNC: 7.8 MG/DL (ref 8.5–10.1)
CHLORIDE SERPL-SCNC: 98 MMOL/L (ref 94–109)
CO2 SERPL-SCNC: 18 MMOL/L (ref 20–32)
COLOR UR AUTO: YELLOW
COPATH REPORT: NORMAL
CREAT SERPL-MCNC: 2.61 MG/DL (ref 0.66–1.25)
ERYTHROCYTE [DISTWIDTH] IN BLOOD BY AUTOMATED COUNT: 19 % (ref 10–15)
GFR SERPL CREATININE-BSD FRML MDRD: 26 ML/MIN/{1.73_M2}
GLUCOSE BLDC GLUCOMTR-MCNC: 100 MG/DL (ref 70–99)
GLUCOSE BLDC GLUCOMTR-MCNC: 108 MG/DL (ref 70–99)
GLUCOSE BLDC GLUCOMTR-MCNC: 137 MG/DL (ref 70–99)
GLUCOSE BLDC GLUCOMTR-MCNC: 180 MG/DL (ref 70–99)
GLUCOSE BLDC GLUCOMTR-MCNC: 191 MG/DL (ref 70–99)
GLUCOSE SERPL-MCNC: 133 MG/DL (ref 70–99)
GLUCOSE UR STRIP-MCNC: NEGATIVE MG/DL
HCT VFR BLD AUTO: 24.3 % (ref 40–53)
HGB BLD-MCNC: 7.5 G/DL (ref 13.3–17.7)
HGB UR QL STRIP: ABNORMAL
INR PPP: 1.38 (ref 0.86–1.14)
KETONES UR STRIP-MCNC: NEGATIVE MG/DL
LEUKOCYTE ESTERASE UR QL STRIP: NEGATIVE
MAGNESIUM SERPL-MCNC: 2.3 MG/DL (ref 1.6–2.3)
MCH RBC QN AUTO: 29.9 PG (ref 26.5–33)
MCHC RBC AUTO-ENTMCNC: 30.9 G/DL (ref 31.5–36.5)
MCV RBC AUTO: 97 FL (ref 78–100)
MUCOUS THREADS #/AREA URNS LPF: PRESENT /LPF
NITRATE UR QL: NEGATIVE
PH UR STRIP: 5.5 PH (ref 5–7)
PHOSPHATE SERPL-MCNC: 4.4 MG/DL (ref 2.5–4.5)
PLATELET # BLD AUTO: 161 10E9/L (ref 150–450)
POTASSIUM SERPL-SCNC: 3.1 MMOL/L (ref 3.4–5.3)
POTASSIUM SERPL-SCNC: 3.5 MMOL/L (ref 3.4–5.3)
PROT SERPL-MCNC: 5 G/DL (ref 6.8–8.8)
RBC # BLD AUTO: 2.51 10E12/L (ref 4.4–5.9)
RBC #/AREA URNS AUTO: 134 /HPF (ref 0–2)
SODIUM SERPL-SCNC: 131 MMOL/L (ref 133–144)
SOURCE: ABNORMAL
SP GR UR STRIP: 1.01 (ref 1–1.03)
TACROLIMUS BLD-MCNC: 6.3 UG/L (ref 5–15)
TME LAST DOSE: NORMAL H
UROBILINOGEN UR STRIP-MCNC: NORMAL MG/DL (ref 0–2)
WBC # BLD AUTO: 4.2 10E9/L (ref 4–11)
WBC #/AREA URNS AUTO: 2 /HPF (ref 0–5)

## 2019-03-26 PROCEDURE — 25000132 ZZH RX MED GY IP 250 OP 250 PS 637: Performed by: STUDENT IN AN ORGANIZED HEALTH CARE EDUCATION/TRAINING PROGRAM

## 2019-03-26 PROCEDURE — 25000128 H RX IP 250 OP 636: Performed by: STUDENT IN AN ORGANIZED HEALTH CARE EDUCATION/TRAINING PROGRAM

## 2019-03-26 PROCEDURE — 25000132 ZZH RX MED GY IP 250 OP 250 PS 637: Performed by: INTERNAL MEDICINE

## 2019-03-26 PROCEDURE — 25000131 ZZH RX MED GY IP 250 OP 636 PS 637: Performed by: NURSE PRACTITIONER

## 2019-03-26 PROCEDURE — 25000132 ZZH RX MED GY IP 250 OP 250 PS 637: Performed by: PHYSICIAN ASSISTANT

## 2019-03-26 PROCEDURE — 93306 TTE W/DOPPLER COMPLETE: CPT

## 2019-03-26 PROCEDURE — 25000128 H RX IP 250 OP 636: Performed by: PHYSICIAN ASSISTANT

## 2019-03-26 PROCEDURE — 25000132 ZZH RX MED GY IP 250 OP 250 PS 637: Performed by: HOSPITALIST

## 2019-03-26 PROCEDURE — 93306 TTE W/DOPPLER COMPLETE: CPT | Mod: 26 | Performed by: INTERNAL MEDICINE

## 2019-03-26 PROCEDURE — 81001 URINALYSIS AUTO W/SCOPE: CPT | Performed by: PHYSICIAN ASSISTANT

## 2019-03-26 PROCEDURE — 40000558 ZZH STATISTIC CVC DRESSING CHANGE

## 2019-03-26 PROCEDURE — 85027 COMPLETE CBC AUTOMATED: CPT | Performed by: THORACIC SURGERY (CARDIOTHORACIC VASCULAR SURGERY)

## 2019-03-26 PROCEDURE — 85610 PROTHROMBIN TIME: CPT | Performed by: THORACIC SURGERY (CARDIOTHORACIC VASCULAR SURGERY)

## 2019-03-26 PROCEDURE — 97110 THERAPEUTIC EXERCISES: CPT | Mod: GP

## 2019-03-26 PROCEDURE — 25000131 ZZH RX MED GY IP 250 OP 636 PS 637: Performed by: INTERNAL MEDICINE

## 2019-03-26 PROCEDURE — 80197 ASSAY OF TACROLIMUS: CPT | Performed by: THORACIC SURGERY (CARDIOTHORACIC VASCULAR SURGERY)

## 2019-03-26 PROCEDURE — 25000131 ZZH RX MED GY IP 250 OP 636 PS 637: Performed by: HOSPITALIST

## 2019-03-26 PROCEDURE — 80076 HEPATIC FUNCTION PANEL: CPT | Performed by: THORACIC SURGERY (CARDIOTHORACIC VASCULAR SURGERY)

## 2019-03-26 PROCEDURE — 36592 COLLECT BLOOD FROM PICC: CPT | Performed by: THORACIC SURGERY (CARDIOTHORACIC VASCULAR SURGERY)

## 2019-03-26 PROCEDURE — 76705 ECHO EXAM OF ABDOMEN: CPT

## 2019-03-26 PROCEDURE — 97530 THERAPEUTIC ACTIVITIES: CPT | Mod: GP

## 2019-03-26 PROCEDURE — 99232 SBSQ HOSP IP/OBS MODERATE 35: CPT | Mod: 25 | Performed by: NURSE PRACTITIONER

## 2019-03-26 PROCEDURE — 25000132 ZZH RX MED GY IP 250 OP 250 PS 637: Performed by: NURSE PRACTITIONER

## 2019-03-26 PROCEDURE — 84132 ASSAY OF SERUM POTASSIUM: CPT | Performed by: THORACIC SURGERY (CARDIOTHORACIC VASCULAR SURGERY)

## 2019-03-26 PROCEDURE — 21400000 ZZH R&B CCU UMMC

## 2019-03-26 PROCEDURE — 25000128 H RX IP 250 OP 636: Performed by: NURSE PRACTITIONER

## 2019-03-26 PROCEDURE — 84100 ASSAY OF PHOSPHORUS: CPT | Performed by: THORACIC SURGERY (CARDIOTHORACIC VASCULAR SURGERY)

## 2019-03-26 PROCEDURE — 83735 ASSAY OF MAGNESIUM: CPT | Performed by: THORACIC SURGERY (CARDIOTHORACIC VASCULAR SURGERY)

## 2019-03-26 PROCEDURE — 80048 BASIC METABOLIC PNL TOTAL CA: CPT | Performed by: THORACIC SURGERY (CARDIOTHORACIC VASCULAR SURGERY)

## 2019-03-26 PROCEDURE — 00000146 ZZHCL STATISTIC GLUCOSE BY METER IP

## 2019-03-26 PROCEDURE — 40000802 ZZH SITE CHECK

## 2019-03-26 PROCEDURE — 25000125 ZZHC RX 250: Performed by: INTERNAL MEDICINE

## 2019-03-26 RX ORDER — BUMETANIDE 0.25 MG/ML
3 INJECTION INTRAMUSCULAR; INTRAVENOUS ONCE
Status: COMPLETED | OUTPATIENT
Start: 2019-03-26 | End: 2019-03-26

## 2019-03-26 RX ORDER — MYCOPHENOLATE MOFETIL 500 MG/1
1500 TABLET, FILM COATED ORAL
Status: DISCONTINUED | OUTPATIENT
Start: 2019-03-27 | End: 2019-04-03 | Stop reason: HOSPADM

## 2019-03-26 RX ORDER — MYCOPHENOLATE MOFETIL 500 MG/1
1500 TABLET, FILM COATED ORAL
Status: DISCONTINUED | OUTPATIENT
Start: 2019-03-27 | End: 2019-03-26

## 2019-03-26 RX ORDER — MYCOPHENOLATE MOFETIL 500 MG/1
1000 TABLET, FILM COATED ORAL
Status: DISCONTINUED | OUTPATIENT
Start: 2019-03-27 | End: 2019-03-26

## 2019-03-26 RX ADMIN — Medication 1 CAPSULE: at 13:34

## 2019-03-26 RX ADMIN — Medication 500000 UNITS: at 09:12

## 2019-03-26 RX ADMIN — BENZOCAINE, MENTHOL 1 LOZENGE: 15; 3.6 LOZENGE ORAL at 20:45

## 2019-03-26 RX ADMIN — LEVOTHYROXINE SODIUM 100 MCG: 100 TABLET ORAL at 09:06

## 2019-03-26 RX ADMIN — MELATONIN TAB 3 MG 6 MG: 3 TAB at 00:06

## 2019-03-26 RX ADMIN — Medication 500000 UNITS: at 19:34

## 2019-03-26 RX ADMIN — SILDENAFIL 20 MG: 20 TABLET ORAL at 13:34

## 2019-03-26 RX ADMIN — PREDNISONE 15 MG: 10 TABLET ORAL at 09:07

## 2019-03-26 RX ADMIN — Medication 2.5 MG: at 23:53

## 2019-03-26 RX ADMIN — ROSUVASTATIN CALCIUM 10 MG: 10 TABLET, FILM COATED ORAL at 15:18

## 2019-03-26 RX ADMIN — DULOXETINE 20 MG: 20 CAPSULE, DELAYED RELEASE ORAL at 09:09

## 2019-03-26 RX ADMIN — CALCIUM CARBONATE (ANTACID) CHEW TAB 500 MG 500 MG: 500 CHEW TAB at 07:33

## 2019-03-26 RX ADMIN — PANTOPRAZOLE SODIUM 40 MG: 40 TABLET, DELAYED RELEASE ORAL at 19:34

## 2019-03-26 RX ADMIN — Medication 500000 UNITS: at 13:45

## 2019-03-26 RX ADMIN — SULFAMETHOXAZOLE AND TRIMETHOPRIM 1 TABLET: 800; 160 TABLET ORAL at 15:18

## 2019-03-26 RX ADMIN — ACETAMINOPHEN 650 MG: 325 TABLET, FILM COATED ORAL at 09:04

## 2019-03-26 RX ADMIN — Medication 2.5 MG: at 13:33

## 2019-03-26 RX ADMIN — ACETAMINOPHEN 650 MG: 325 TABLET, FILM COATED ORAL at 19:34

## 2019-03-26 RX ADMIN — Medication 2.5 MG: at 19:34

## 2019-03-26 RX ADMIN — Medication 2.5 MG: at 09:04

## 2019-03-26 RX ADMIN — TACROLIMUS 2.5 MG: 1 CAPSULE ORAL at 17:22

## 2019-03-26 RX ADMIN — Medication 500000 UNITS: at 17:21

## 2019-03-26 RX ADMIN — MESALAMINE 2400 MG: 800 TABLET, DELAYED RELEASE ORAL at 19:34

## 2019-03-26 RX ADMIN — SILDENAFIL 20 MG: 20 TABLET ORAL at 09:06

## 2019-03-26 RX ADMIN — TACROLIMUS 2 MG: 1 CAPSULE ORAL at 09:06

## 2019-03-26 RX ADMIN — MYCOPHENOLATE MOFETIL 1000 MG: 500 TABLET, FILM COATED ORAL at 09:08

## 2019-03-26 RX ADMIN — ACETAMINOPHEN 650 MG: 325 TABLET, FILM COATED ORAL at 13:33

## 2019-03-26 RX ADMIN — MELATONIN TAB 3 MG 9 MG: 3 TAB at 20:45

## 2019-03-26 RX ADMIN — ALUMINUM HYDROXIDE, MAGNESIUM HYDROXIDE, AND DIMETHICONE 30 ML: 400; 400; 40 SUSPENSION ORAL at 08:47

## 2019-03-26 RX ADMIN — FLUTICASONE PROPIONATE 2 PUFF: 220 AEROSOL, METERED RESPIRATORY (INHALATION) at 09:11

## 2019-03-26 RX ADMIN — ACETAMINOPHEN 650 MG: 325 TABLET, FILM COATED ORAL at 23:53

## 2019-03-26 RX ADMIN — MESALAMINE 2400 MG: 800 TABLET, DELAYED RELEASE ORAL at 09:10

## 2019-03-26 RX ADMIN — Medication 2.5 MG: at 02:55

## 2019-03-26 RX ADMIN — Medication 10 ML: at 19:35

## 2019-03-26 RX ADMIN — SENNOSIDES AND DOCUSATE SODIUM 2 TABLET: 8.6; 5 TABLET ORAL at 19:35

## 2019-03-26 RX ADMIN — BUMETANIDE 3 MG: 0.25 INJECTION INTRAMUSCULAR; INTRAVENOUS at 15:18

## 2019-03-26 RX ADMIN — SILDENAFIL 20 MG: 20 TABLET ORAL at 19:34

## 2019-03-26 RX ADMIN — RANITIDINE 150 MG: 150 TABLET ORAL at 09:08

## 2019-03-26 RX ADMIN — MYCOPHENOLATE MOFETIL 1000 MG: 500 TABLET, FILM COATED ORAL at 17:21

## 2019-03-26 RX ADMIN — PREDNISONE 10 MG: 10 TABLET ORAL at 19:34

## 2019-03-26 RX ADMIN — POTASSIUM CHLORIDE 40 MEQ: 750 TABLET, EXTENDED RELEASE ORAL at 06:31

## 2019-03-26 RX ADMIN — MONTELUKAST SODIUM 10 MG: 10 TABLET, COATED ORAL at 20:45

## 2019-03-26 RX ADMIN — PANTOPRAZOLE SODIUM 40 MG: 40 TABLET, DELAYED RELEASE ORAL at 09:04

## 2019-03-26 RX ADMIN — POTASSIUM CHLORIDE 20 MEQ: 29.8 INJECTION, SOLUTION INTRAVENOUS at 09:36

## 2019-03-26 ASSESSMENT — PAIN DESCRIPTION - DESCRIPTORS
DESCRIPTORS: ACHING
DESCRIPTORS: SHARP

## 2019-03-26 NOTE — PLAN OF CARE
D: Admitted 2/17 with HF. S/p OHT 2/24.     I: NJ and tube feedings discontinued 3/25, now on regular diet. PRN Tylenol and oxycodone for pain. Melatonin for sleep. TIFFANY drain in place. Replace electrolytes per protocol.     A: VSS, A&O, up with 2 and walker. Pt reporting pain in right lower quadrant of abd, relieved with PRN medications. Reports MATA, oxygen stable on RA. Tele SR, HR 80-90's. Pt weak, requires strong assist of two, very winded with activity. Reports intermittent nausea but declining antiemetics overnight. Minimal urine output overnight. Creatinine up on 3/25, Orders placed for dialysis today. Slept off and on throughout night.     P: Will continue to monitor and notify MD of pertinent changes.

## 2019-03-26 NOTE — PROGRESS NOTES
Nephrology Progress Note  March 24, 2019        ASSESSMENT AND RECOMMENDATIONS:   Mr. Larios is a 53 year old male with history of chronic renal insufficiency, EtOH abuse, GIB w/splenic embolization, PAF s/p ablation and cardioversion, NICM c/b cardiogenic shock requiring IABP, s/p OHT (2/24/19) on tacrolimus and MMF, complicated by RV dysfunction per echo and acute oliguric kidney injury.      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 Baseline Crt 1.1- 1.4 over the last 6 months; however, prior BMPs have demonstrated patient Cr as high as 2.0 mg/dl. He most likely has a baseline level of CKD due to renal artery stenosis (longstanding smoking history, abdominal bruit on physical examination) and possible smaller FRANKLIN episodes from HFrEF exacerbations. Current rise in creatinine started 3/1 at 1.89, and increasing to 4.01 3/7 and oliguria is most likely due to nephrotoxicity associated with tacrolimus (a renal vasoconstrictor), and diuretic use. UOP improved but have not been measured accurately due to urination with frequent bowel movements.Suspect he could have some recovery of function. However, he continues to have anephric rise in serum creatinine between dialysis treatments. Patient was last dialyzed on 03/23  - remain oliguric   - Will plan next HD likely tomorrow  -monitor BMP nad UOP daily  -avoid nephrotoxins including contrast and NSAID's  -renally dose medications    # S/p OHT -on IS with MMF, Tacrolimus. Tac level was 8.4 this am. Managed by transplant team.      #Electrolytes:  -mild hyponatremia . Improved to 132 this am. Should improve with RRT  -normal serum potassium of 3.8. Recommend to con't Nepro for TF and place on renal diet when able to take PO    #Acid Base status- AGMA likely secondary to FRANKLIN. Will improve with dialysis     #BP/Volume status:Acceptable BP. Patient appears euvolemic  on exam. Will target EDW ~54 kgs  -daily weight, strict I/O     #Anemia Hgb7.7     #Thrombocytopenia: PLT improving      Recommendations were communicated to primary team via this note.  Patient seen and discussed  with Dr. Beverly Lewis  Nephrology Fellow  Pager: [phone]      Interval History :   Nursing and provider notes from last 24 hours reviewed.  No major events overnight, no new complaint, blood pressure stable, urine output remained low.     MEDICATIONS:  Current Meds    DULoxetine  20 mg Oral Daily     fluticasone  2 puff Inhalation Q12H     heparin lock flush  5-15 mL Intracatheter Q24H     insulin aspart  1-6 Units Subcutaneous Q4H     levothyroxine  100 mcg Oral Daily     lidocaine  1 patch Transdermal Q24H     lidocaine   Transdermal Q8H     lidocaine   Transdermal Q24h     LORazepam  0.5 mg Oral Once     melatonin  3 mg Oral At Bedtime     mesalamine  2,400 mg Oral BID     montelukast  10 mg Oral At Bedtime     multivitamin RENAL  1 capsule Oral Daily     mycophenolate  1,000 mg Oral BID IS     nystatin  500,000 Units Oral 4x Daily     pantoprazole  40 mg Oral BID     [START ON 4/1/2019] pentamidine  300 mg Inhalation Once     predniSONE  10 mg Oral QPM     predniSONE  15 mg Oral Daily     ranitidine  150 mg Oral Daily     rosuvastatin  10 mg Oral Daily     senna-docusate  1 tablet Oral BID    Or     senna-docusate  2 tablet Oral BID     sildenafil  20 mg Oral TID     sodium chloride (PF)  10 mL Intracatheter Q8H     sodium chloride (PF)  3 mL Intracatheter Q8H     [START ON 3/26/2019] sulfamethoxazole-trimethoprim  1 tablet Oral Daily     tacrolimus  2 mg Oral QAM     tacrolimus  2 mg Oral QPM     valGANciclovir  450 mg Oral Once per day on Mon Fri     Infusion Meds    IV fluid REPLACEMENT ONLY       - MEDICATION INSTRUCTIONS -       BETA BLOCKER NOT PRESCRIBED       REVIEW OF SYSTEMS:  A comprehensive of systems was not obtained due to mental status.      PHYSICAL EXAM:   Temp  Avg:  "97.9  F (36.6  C)  Min: 95.9  F (35.5  C)  Max: 100  F (37.8  C)  Arterial Line BP  Min: 68/56  Max: 182/36  Arterial Line BP 2  Min: 90/54  Max: 107/56  Arterial Line MAP (mmHg)  Av.7 mmHg  Min: 57 mmHg  Max: 123 mmHg Arterial Line Location 2: Right femoral    Pulse  Av.5  Min: 59  Max: 140 Resp  Av.1  Min: 8  Max: 45  FiO2 (%)  Av %  Min: 40 %  Max: 100 %  SpO2  Av.1 %  Min: 88 %  Max: 100 %    CVP (mmHg): 21 mmHg  /79 (BP Location: Left arm)   Pulse 81   Temp 98.4  F (36.9  C) (Oral)   Resp 16   Ht 1.727 m (5' 8\")   Wt 58 kg (127 lb 14.4 oz)   SpO2 99%   BMI 19.45 kg/m     Date 19 0700 - 19 0659   Shift 3282-6732 9593-1625 9601-0082 24 Hour Total   INTAKE   P.O. 100   100   I.V. 70.4   70.4   Shift Total(mL/kg) 170.4(2.74)   170.4(2.74)   OUTPUT   Chest Tube(mL/kg) 238(3.83)   238(3.83)   Shift Total(mL/kg) 238(3.83)   238(3.83)   Weight (kg) 62.1 62.1 62.1 62.1      Admit Weight: 63.5 kg (140 lb 1.6 oz)     GENERAL APPEARANCE: Alert, NAD  Lymphatics: no cervical LAD  HEENT: NJT in place, sclerae anicteric  Pulmonary: lungs clear to auscultation with equal breath sounds bilaterally  CV: regular rhythm, normal rate   - Edema: None  GI: soft, nontender, normal bowel sounds.  SKIN: no rash, warm - R Chest Dressing in place  NEURO: face symmetric, AO x 3    LABS:   I personally reviewed the labs.     CMP  Recent Labs   Lab 19  0612 19  0552 19  0609 19  0728   * 132* 131* 132*   POTASSIUM 3.4 3.8 5.0 4.4   CHLORIDE 97 97 96 98   CO2 18* 24 17* 19*   ANIONGAP 16* 11 18* 16*   * 98 100* 107*   BUN 78* 68* 98* 89*   CR 2.64* 2.07* 3.00* 2.82*   GFRESTIMATED 26* 35* 22* 24*   GFRESTBLACK 30* 40* 26* 28*   RADAMES 8.0* 8.0* 8.4* 8.2*   MAG 2.3 2.3 2.5* 2.0   PHOS 4.7* 4.7* 6.8* 5.5*     CBC  Recent Labs   Lab 19  0612 19  0705 19  0552 19  0609 19  0728   HGB 7.6* 6.9* 6.8* 7.7* 7.0*   WBC 5.0  --  5.0 7.6 7.5 "   RBC 2.50*  --  2.24* 2.51* 2.28*   HCT 24.2*  --  22.1* 24.7* 22.3*   MCV 97  --  99 98 98   MCH 30.4  --  30.4 30.7 30.7   MCHC 31.4*  --  30.8* 31.2* 31.4*   RDW 19.3*  --  19.6* 19.2* 18.9*   *  --  115* 114* 88*     INR  Recent Labs   Lab 03/25/19  0612 03/24/19  0552 03/23/19  0609 03/22/19  0728   INR 1.41* 1.40* 1.35* 1.37*     ABG  No lab results found in last 7 days.   URINE STUDIES  Recent Labs   Lab Test 03/17/19  0045 03/04/19  1303 02/23/19  1921 02/18/19  0957   COLOR Yellow Yellow Yellow Yellow   APPEARANCE Slightly Cloudy Clear Clear Clear   URINEGLC 30* Negative Negative Negative   URINEBILI Negative Negative Negative Negative   URINEKETONE Negative Negative Negative Negative   SG 1.016 1.014 1.008 1.008   UBLD Small* Negative Negative Negative   URINEPH 5.5 5.0 6.0 5.5   PROTEIN 100* Negative Negative Negative   NITRITE Negative Negative Negative Negative   LEUKEST Negative Negative Negative Negative   RBCU 14* 0 <1 <1   WBCU 15* 0 <1 1     No lab results found.  PTH  No lab results found.  IRON STUDIES  Recent Labs   Lab Test 11/15/18  0955 10/11/18  1235   IRON  --  63   FEB  --  457*   IRONSAT  --  14*   NATE 1,045*  --        IMAGING:  Reviewed    I, Abhijeet Paul, saw this patient with Dr. Lewis and agree with the findings and plan of care as documented in the note.

## 2019-03-26 NOTE — PROGRESS NOTES
CVTS Daily Note  3/26/2019  Attending: Dr House    S:   Patient working with therapies and using therapy belt this AM around waist. Patient now reports significant tenderness in the RUQ and lower right ribs. Using heat packs. Patient reports previous RUQ discomfort but not to this degree. No nausea or vomiting. Denies increased SOB or CP.  +BM.  Jamey drainage slowing down, no hematoma. Hgb stable. Bx results still pending.    O:   Vitals:    03/25/19 2323 03/26/19 0300 03/26/19 0721 03/26/19 1215   BP: 116/73 109/78 122/71 111/78   BP Location: Left arm Left arm Left arm Left arm   Pulse:       Resp: 16 18 18 18   Temp: 98.3  F (36.8  C) 98.3  F (36.8  C) 98.3  F (36.8  C) 98.1  F (36.7  C)   TempSrc: Oral Oral Oral Oral   SpO2: 99% 100% 100% 100%   Weight:       Height:         Vitals:    03/23/19 1200 03/23/19 1800 03/25/19 0300   Weight: 56.3 kg (124 lb 1.6 oz) 55.7 kg (122 lb 12.8 oz) 58 kg (127 lb 14.4 oz)           Gen: laying flat in bed with heat pack over RUQ, reports pain in area after therapies.   CV: RRR, telemetry SR 80s,  Trace 1+ pedal edema LE  Pulm: CTA  Abd: BS+, +ND, significant tenderness RUQ, firm, no rebound   Jamey drain to right pacer pocket with 15 ml yesterday, 10 ml today. Will remove.     Labs:  BMP  Recent Labs   Lab 03/26/19  0531 03/25/19  0612 03/24/19  0552 03/23/19  0609   * 132* 132* 131*   POTASSIUM 3.1* 3.4 3.8 5.0   CHLORIDE 98 97 97 96   RADAMES 7.8* 8.0* 8.0* 8.4*   CO2 18* 18* 24 17*   BUN 74* 78* 68* 98*   CR 2.61* 2.64* 2.07* 3.00*   * 115* 98 100*     CBC  Recent Labs   Lab 03/26/19  0531 03/25/19  0612 03/24/19  0705 03/24/19  0552 03/23/19  0609   WBC 4.2 5.0  --  5.0 7.6   RBC 2.51* 2.50*  --  2.24* 2.51*   HGB 7.5* 7.6* 6.9* 6.8* 7.7*   HCT 24.3* 24.2*  --  22.1* 24.7*   MCV 97 97  --  99 98   MCH 29.9 30.4  --  30.4 30.7   MCHC 30.9* 31.4*  --  30.8* 31.2*   RDW 19.0* 19.3*  --  19.6* 19.2*    135*  --  115* 114*     INR  Recent Labs   Lab  03/26/19  0531 03/25/19  0612 03/24/19  0552 03/23/19  0609   INR 1.38* 1.41* 1.40* 1.35*      Hepatic Panel   Lab Results   Component Value Date    AST 36 03/18/2019     Lab Results   Component Value Date    ALT 57 03/18/2019     Lab Results   Component Value Date    ALBUMIN 2.6 03/18/2019     GLUCOSE:   Recent Labs   Lab 03/26/19  1242 03/26/19  0727 03/26/19  0531 03/26/19  0250 03/25/19  2132 03/25/19  1657 03/25/19  1120 03/25/19  0612  03/24/19  0552  03/23/19  0609  03/22/19  0728  03/22/19  0010   GLC  --   --  133*  --   --   --   --  115*  --  98  --  100*  --  107*  --  135*   * 137*  --  180* 272* 94 88  --    < >  --    < >  --    < >  --    < >  --     < > = values in this interval not displayed.     Culture Micro (Abnormal) 03/20/2019  4:30    Abnormal   Light growth   Klebsiella oxytoca   Susceptibility testing in progress          Imaging:    3/21/19  7:55 PM [id number] [facility], [place],  Radiology    PACS Images      Show images for XR Chest 2 Views       Imaging: reviewed     A/P:  Everton Larios is a 53 year old male with PMH etoh abuse, hypothyroidism, intermittent asthma, ulcerative colitis, Depression, Chronic HFrEF, NICM admitted with cardiogenic shock requiring subclavian balloon pump. He is now s/p OHT 2/24/19 with Piyush House and Christopher.    - RHC and biopsy 3/4; 0R, no AMR  - RHC and biopsy 3/11; 0R, no AMR  - RHC and biopsy 3/18; 1R, no AMR   - RHC and biopsy pending 3/25   Neuro:   - Neuro intact. ICU delirium improving. Head CT 3/9 without acute pathology.   - Hx Depression: pta cymbalta  - Sleep: melatonin   - Tylenol and oxycodone  -  tramadol      CV:   - Hx of NICM, now S/P heart transplant 2/24  - No arrhythmia, HD stable.  - ASA 81 mg, Crestor  - Sildenafil 20 mg TID.   - Immunosuppression per cardiology   - RHC 3/25 Mild elevated filling pressures.   - will remove lena drain to right pacer pocket today.      Pulm:   - Hx intermittent asthma, pta Singulair.   - Pulm  toilet, IS, activity and deep breathing   - Supplemental O2 PRN to keep sats > 92%. Wean off as tolerated.  - LLL consolidation, albuterol and mucomyst Nebs today x 2, assess response.   - Sputum culture showing Klebsiella- zosyn started 3/21, changed to bactrim with sensitivities, ID consulted-continue Bactrim for 10 days.          ID:   - WBC 4.2, afebrile, no signs or symptoms of infection other than SOB. Completed 7 days cefepime/zosyn 3/16.   - Blood cultures 3/13: NGTD. C diff negative 3/17.    - Sputum culture positive 3/20, bactrim, ID consulted, continue bactrim for klebsiella pneumonia. Discuss duration with ID.       GI / FEN:  - Reg diet, bowel regimen. Calorie counts. Na 131  - Tube feeds were off 3/21 for nausea. New formula 3/22 pm. - removed NJ and stopped tube feeding 3/25, patient refusing TF since 3/20.   - Hx ulcerative colitis; pta mesalamine. Stool occult negative 3/20.  - +BM  - Increased PPI to bid for GERD  - RUQ abdominal pain: RUQ US today, LFT's        Renal / :   - Creatinine 2.61, adequate UOP  - Nephrology following for oliguria and T/Th/Sa hemodialysis. Unable to get tunneled dialysis catheter due to lena drain to old right pacer pocket. RIJ dialysis catheter no longer functional and dialysis unable to be performed 3/21. Patient is showing signs of renal recovery but still not enough to go completely without dialysis. RIJ dialysis catheter replaced on 3/22  - Has not had dialysis since 3/23, electrolytes stable. Nephrology want to hold off on dialysis again today and give Bumex challenge 3 IV once. Continue to re dose Bumex on non dialysis days.      Heme / Anticoagulation:  - Got 1 unit PRBC for Hgb 6.9 on 3/24, hgb 7.5 today, stable. No signs of bleeding.   - Will need coumadin eventually for SVC graft per Dr House. Will need to discuss with Cardiology, may prefer Apixaban due to frequent biopsies.    - No biopsies or central line access on left due to SVC graft.   - Holding  Hep gtt since 3/18. Has R chest hematoma shouldn't interfere with RIGHT chest HD tunneled catheter placement. Chest CT 3/19 confirms R chest hematoma at old AICD site, s/p drainage in OR 3/21.   Endo:   - Hypothyroid: levothyroxine 100 mcg daily   - Sliding corrective scale qid      PPX:   - Protonix     Dispo:   - 6C since 3/16  - RUQ US and LFT's today  - ARU after tunneled dialysis catheter placed or further renal recovery and no dialysis. Hold on tunneled catheter for now, patient showing renal recovery.     Seen and discussed with staff     Sheila Ann PA-C  Cardiothoracic Surgery  Pager [phone]  March 26, 2019

## 2019-03-26 NOTE — PROGRESS NOTES
[facility]   Cardiology II Service / Advanced Heart Failure  Daily Consult Note      Patient: Everton Larios  MRN: [id number]  Admission Date: 2/17/2019  Hospital Day # 37    Assessment and Plan: Everton Larios is a 54 year old male with history of hypothyroidism, asthma, UC, depression, etoh abuse, s/p orthotopic heart transplant 2/24/19 for non ischemic cardiomyopathy and cardiogenic shock requiring IABP. Post-op complicated by RV dysfunction and FRANKLIN requiring HD.     Recommendations:  -increase tacrolimus 2.5 mg bid daily levels  -increase Cellcept   -consider decrease in sildenafil tomorrow if RV ok by echo  -echo, DSAs, EBV PCR per protocol   -will discuss with nephrology ?diuretic challenge    # Status post OHT on 2/24/19, history of NICM  # Chronic immunosuppression  * TTE 3/5/19: EF 60-65%, mild-mod RV dilation, global RV function mildly reduced, moderate TR, IVC normal without collapse   * RHC 3/25/19: RA 12, PA 33/20(25), PCWP 20, Jaden CO/CI 4.9/2.9    Volume Status/Graft Function:   --Volume: fluid up, ?if diuretic challenge today  --BP: at goal not on Rx   --HR: 70s-80s  --RV support: sildenafil 20 mg tid, continue until after echo    Immunosuppression:   --did not receive Simulect  --continue prednisone per taper with negative bx, currently 15/10 mg   --increase Cellcept 1500 mg BID per protocol now that platelets wnl   --increase tacrolimus to 2.5 mg bid, goal 8-10, 6.3 today    Prophylaxis:   --CAV: holding aspirin 81 mg given bleeding post-op, rosuvastatin 10 mg daily  --Thrush: Nystatin swish and swallow  --PCP: pentamidine given 3/3, due 4/1 unless can start Bactrim for ppx  --GI: Protonix   --CMV: D+/R+, valcyte renally dosed through May, weekly CMV PCR for now per ID through mid April then per protocol  --Osteoporosis: calcium/vitamin D      Serostatus: CMV: D+/R+. EBV: D+/R+    Biopsies:   --#1 3/4: NER  --#2 3/11: NER  --#3 3/18: NER  --#4 3/25: pending  --#5 due  "4/8  *No biopsies or central line access on left due to SVC graft    #FRANKLIN on iHD, oliguric   -renal managing  -will discuss possible diuretic challenge with renal given evidence of recovery    #Klebsiella in sputum  Opacity by CXR 3/22 and GGO in right ling 3/19. S/p cefepime/zosyn x 7 days (-3/16), improved clinically. Now on four days of Bactrim?   -will clarify abx plan with CVTS  -monitor resp symptoms     ================================================================    Subjective/24-Hr Events:   Last 24 hr care team notes reviewed. No overnight events, didn't sleep great. Had bx/rhc last evening, tolerated procedures. Denies signs of fluid retention. Continues to note productive cough ?improved, sputum is mostly clear. No fevers.     ROS:  4 point ROS including respiratory, CV, GI and  (other than that noted in the HPI) is negative.     Medications: Reviewed in EPIC.     Physical Exam:   /78 (BP Location: Left arm)   Pulse 81   Temp 98.1  F (36.7  C) (Oral)   Resp 18   Ht 1.727 m (5' 8\")   Wt 58 kg (127 lb 14.4 oz)   SpO2 100%   BMI 19.45 kg/m      GENERAL: Appears comfortable, in no distress.  HEENT: Eye symmetrical, no discharge or icterus bilaterally. Mucous membranes moist and without lesions.  NECK: Supple, JVD 3cm above clavicle at 90 degrees.   CV: RRR, +S1S2, no murmur, rub, or gallop.   RESPIRATORY: Respirations regular, even, and unlabored. Lungs CTA throughout.   GI: Soft and non distended with normoactive bowel sounds present in all quadrants. No tenderness, rebound, guarding.   EXTREMITIES: 1+ pitting ankle peripheral edema. 2+ bilateral pedal pulses.   NEUROLOGIC: Alert and oriented x 3. No focal deficits.   MUSCULOSKELETAL: No joint swelling or tenderness.   SKIN: No jaundice. No rashes or lesions. Sternum stable.     Labs:  CMP  Recent Labs   Lab 03/26/19  0531 03/25/19  0612 03/24/19  0552 03/23/19  0609   * 132* 132* 131*   POTASSIUM 3.1* 3.4 3.8 5.0   CHLORIDE 98 97 97 " 96   CO2 18* 18* 24 17*   ANIONGAP 15* 16* 11 18*   * 115* 98 100*   BUN 74* 78* 68* 98*   CR 2.61* 2.64* 2.07* 3.00*   GFRESTIMATED 26* 26* 35* 22*   GFRESTBLACK 30* 30* 40* 26*   RADAMES 7.8* 8.0* 8.0* 8.4*   MAG 2.3 2.3 2.3 2.5*   PHOS 4.4 4.7* 4.7* 6.8*   PROTTOTAL 5.0*  --   --   --    ALBUMIN 2.4*  --   --   --    BILITOTAL 0.5  --   --   --    ALKPHOS 110  --   --   --    AST 16  --   --   --    ALT 23  --   --   --        CBC  Recent Labs   Lab 03/26/19  0531 03/25/19  0612 03/24/19  0705 03/24/19  0552 03/23/19  0609   WBC 4.2 5.0  --  5.0 7.6   RBC 2.51* 2.50*  --  2.24* 2.51*   HGB 7.5* 7.6* 6.9* 6.8* 7.7*   HCT 24.3* 24.2*  --  22.1* 24.7*   MCV 97 97  --  99 98   MCH 29.9 30.4  --  30.4 30.7   MCHC 30.9* 31.4*  --  30.8* 31.2*   RDW 19.0* 19.3*  --  19.6* 19.2*    135*  --  115* 114*       INR  Recent Labs   Lab 03/26/19  0531 03/25/19  0612 03/24/19  0552 03/23/19  0609   INR 1.38* 1.41* 1.40* 1.35*       Time/Communication  I personally spent a total of 25 minutes. Of that 15 minutes was counseling/coordination of patient's care. Plan of care discussed with patient. See my note above for details.    Patient discussed with Dr. Beck.      Candida Srivastava, GHADA, NP-C  Advanced Heart Failure/Cardiology II Service  Pager [phone]

## 2019-03-26 NOTE — PLAN OF CARE
"Admitted 2/17 with HF. S/p OHT 2/24. VSS. Tolerating room air. Complains of increased pain in right ribcage after working with PT and using the gait belt. Says it feels like he \"broke a rib.\" 2.5mg oxycodone did not relieve pain, but controlled it. Notified MD and abdominal ultrasound ordered. NPO most of shift for ultrasound. Brandon blood noted on tip of penis and urine blood tinged- MD notified and UA/UC sent. TIFFANY removed by CVTS. Potassium replaced per protocol. Dialysis cancelled by nephrology. Echo completed at bedside. Plan to continue to monitor patient and notify physician with changes or concerns.                 "

## 2019-03-26 NOTE — PLAN OF CARE
Discharge Planner PT   Patient plan for discharge: ARU  Current status: pt needs HOB elevated (~40 deg) to perform sup to sitting mod I, sit<>stands mod - max A x2. Pt ambulates to/from bathroom ~15 feet x2 with CGA and FWW. Limited by pain and generalized fatigue.  Barriers to return to prior living situation: level of assist, medical stability  Recommendations for discharge: ARU  Rationale for recommendations: pt would benefit from ARU to progress tolerance to activity and strength for increased indep with functional mobility       Entered by: Ronda Rodriguez 03/26/2019 8:56 AM

## 2019-03-26 NOTE — PROGRESS NOTES
Calorie Count  Intake recorded for: 3/25  Total Kcals: 720 Total Protein: 33g  Kcals from Hospital Food: 555  Protein: 31g  Kcals from Outside Food (average):165 Protein: 2g  # Meals Recorded: 100% banana, pineapple, 75% cupcake   # Supplements Recorded: 100% 1 Gelatein Plus, 1 Boost Breeze

## 2019-03-27 ENCOUNTER — APPOINTMENT (OUTPATIENT)
Dept: PHYSICAL THERAPY | Facility: CLINIC | Age: 54
DRG: 001 | End: 2019-03-27
Attending: INTERNAL MEDICINE
Payer: COMMERCIAL

## 2019-03-27 ENCOUNTER — COMMUNICATION - HEALTHEAST (OUTPATIENT)
Dept: INTERNAL MEDICINE | Facility: CLINIC | Age: 54
End: 2019-03-27

## 2019-03-27 ENCOUNTER — RESULTS ONLY (OUTPATIENT)
Dept: OTHER | Facility: CLINIC | Age: 54
End: 2019-03-27

## 2019-03-27 DIAGNOSIS — E87.6 HYPOKALEMIA: ICD-10-CM

## 2019-03-27 LAB
ANION GAP SERPL CALCULATED.3IONS-SCNC: 13 MMOL/L (ref 3–14)
BUN SERPL-MCNC: 76 MG/DL (ref 7–30)
CALCIUM SERPL-MCNC: 7.9 MG/DL (ref 8.5–10.1)
CHLORIDE SERPL-SCNC: 100 MMOL/L (ref 94–109)
CO2 SERPL-SCNC: 20 MMOL/L (ref 20–32)
CREAT SERPL-MCNC: 2.47 MG/DL (ref 0.66–1.25)
ERYTHROCYTE [DISTWIDTH] IN BLOOD BY AUTOMATED COUNT: 19.4 % (ref 10–15)
GFR SERPL CREATININE-BSD FRML MDRD: 28 ML/MIN/{1.73_M2}
GLUCOSE BLDC GLUCOMTR-MCNC: 108 MG/DL (ref 70–99)
GLUCOSE BLDC GLUCOMTR-MCNC: 125 MG/DL (ref 70–99)
GLUCOSE BLDC GLUCOMTR-MCNC: 129 MG/DL (ref 70–99)
GLUCOSE BLDC GLUCOMTR-MCNC: 153 MG/DL (ref 70–99)
GLUCOSE BLDC GLUCOMTR-MCNC: 233 MG/DL (ref 70–99)
GLUCOSE SERPL-MCNC: 130 MG/DL (ref 70–99)
HCT VFR BLD AUTO: 24.4 % (ref 40–53)
HGB BLD-MCNC: 7.6 G/DL (ref 13.3–17.7)
INR PPP: 1.39 (ref 0.86–1.14)
MAGNESIUM SERPL-MCNC: 2.2 MG/DL (ref 1.6–2.3)
MCH RBC QN AUTO: 30.2 PG (ref 26.5–33)
MCHC RBC AUTO-ENTMCNC: 31.1 G/DL (ref 31.5–36.5)
MCV RBC AUTO: 97 FL (ref 78–100)
NT-PROBNP SERPL-MCNC: ABNORMAL PG/ML (ref 0–900)
PHOSPHATE SERPL-MCNC: 3.6 MG/DL (ref 2.5–4.5)
PLATELET # BLD AUTO: 187 10E9/L (ref 150–450)
POTASSIUM SERPL-SCNC: 3.5 MMOL/L (ref 3.4–5.3)
RBC # BLD AUTO: 2.52 10E12/L (ref 4.4–5.9)
SODIUM SERPL-SCNC: 133 MMOL/L (ref 133–144)
TACROLIMUS BLD-MCNC: 7.7 UG/L (ref 5–15)
TME LAST DOSE: NORMAL H
TROPONIN I SERPL-MCNC: 0.2 UG/L (ref 0–0.04)
WBC # BLD AUTO: 3.9 10E9/L (ref 4–11)

## 2019-03-27 PROCEDURE — 25000125 ZZHC RX 250: Performed by: INTERNAL MEDICINE

## 2019-03-27 PROCEDURE — 40000802 ZZH SITE CHECK

## 2019-03-27 PROCEDURE — 25000132 ZZH RX MED GY IP 250 OP 250 PS 637: Performed by: STUDENT IN AN ORGANIZED HEALTH CARE EDUCATION/TRAINING PROGRAM

## 2019-03-27 PROCEDURE — 86832 HLA CLASS I HIGH DEFIN QUAL: CPT | Performed by: INTERNAL MEDICINE

## 2019-03-27 PROCEDURE — 25000132 ZZH RX MED GY IP 250 OP 250 PS 637: Performed by: PHYSICIAN ASSISTANT

## 2019-03-27 PROCEDURE — 25000131 ZZH RX MED GY IP 250 OP 636 PS 637: Performed by: NURSE PRACTITIONER

## 2019-03-27 PROCEDURE — 86833 HLA CLASS II HIGH DEFIN QUAL: CPT | Performed by: INTERNAL MEDICINE

## 2019-03-27 PROCEDURE — 87086 URINE CULTURE/COLONY COUNT: CPT | Performed by: NURSE PRACTITIONER

## 2019-03-27 PROCEDURE — 97530 THERAPEUTIC ACTIVITIES: CPT | Mod: GP

## 2019-03-27 PROCEDURE — 84484 ASSAY OF TROPONIN QUANT: CPT | Performed by: THORACIC SURGERY (CARDIOTHORACIC VASCULAR SURGERY)

## 2019-03-27 PROCEDURE — 80048 BASIC METABOLIC PNL TOTAL CA: CPT | Performed by: THORACIC SURGERY (CARDIOTHORACIC VASCULAR SURGERY)

## 2019-03-27 PROCEDURE — 87799 DETECT AGENT NOS DNA QUANT: CPT | Performed by: THORACIC SURGERY (CARDIOTHORACIC VASCULAR SURGERY)

## 2019-03-27 PROCEDURE — 83735 ASSAY OF MAGNESIUM: CPT | Performed by: THORACIC SURGERY (CARDIOTHORACIC VASCULAR SURGERY)

## 2019-03-27 PROCEDURE — 25000132 ZZH RX MED GY IP 250 OP 250 PS 637: Performed by: INTERNAL MEDICINE

## 2019-03-27 PROCEDURE — 99232 SBSQ HOSP IP/OBS MODERATE 35: CPT | Performed by: NURSE PRACTITIONER

## 2019-03-27 PROCEDURE — 84100 ASSAY OF PHOSPHORUS: CPT | Performed by: THORACIC SURGERY (CARDIOTHORACIC VASCULAR SURGERY)

## 2019-03-27 PROCEDURE — 25000131 ZZH RX MED GY IP 250 OP 636 PS 637: Performed by: HOSPITALIST

## 2019-03-27 PROCEDURE — 25000128 H RX IP 250 OP 636: Performed by: PHYSICIAN ASSISTANT

## 2019-03-27 PROCEDURE — 25000132 ZZH RX MED GY IP 250 OP 250 PS 637: Performed by: NURSE PRACTITIONER

## 2019-03-27 PROCEDURE — 83880 ASSAY OF NATRIURETIC PEPTIDE: CPT | Performed by: THORACIC SURGERY (CARDIOTHORACIC VASCULAR SURGERY)

## 2019-03-27 PROCEDURE — 21400000 ZZH R&B CCU UMMC

## 2019-03-27 PROCEDURE — 85027 COMPLETE CBC AUTOMATED: CPT | Performed by: THORACIC SURGERY (CARDIOTHORACIC VASCULAR SURGERY)

## 2019-03-27 PROCEDURE — 80197 ASSAY OF TACROLIMUS: CPT | Performed by: NURSE PRACTITIONER

## 2019-03-27 PROCEDURE — 85610 PROTHROMBIN TIME: CPT | Performed by: THORACIC SURGERY (CARDIOTHORACIC VASCULAR SURGERY)

## 2019-03-27 PROCEDURE — 00000146 ZZHCL STATISTIC GLUCOSE BY METER IP

## 2019-03-27 PROCEDURE — 36592 COLLECT BLOOD FROM PICC: CPT | Performed by: THORACIC SURGERY (CARDIOTHORACIC VASCULAR SURGERY)

## 2019-03-27 PROCEDURE — 97116 GAIT TRAINING THERAPY: CPT | Mod: GP

## 2019-03-27 RX ORDER — POTASSIUM CHLORIDE 750 MG/1
20 TABLET, EXTENDED RELEASE ORAL ONCE
Status: COMPLETED | OUTPATIENT
Start: 2019-03-27 | End: 2019-03-27

## 2019-03-27 RX ORDER — BUMETANIDE 0.25 MG/ML
3 INJECTION INTRAMUSCULAR; INTRAVENOUS ONCE
Status: COMPLETED | OUTPATIENT
Start: 2019-03-27 | End: 2019-03-27

## 2019-03-27 RX ADMIN — PANTOPRAZOLE SODIUM 40 MG: 40 TABLET, DELAYED RELEASE ORAL at 19:27

## 2019-03-27 RX ADMIN — ACETAMINOPHEN 650 MG: 325 TABLET, FILM COATED ORAL at 22:43

## 2019-03-27 RX ADMIN — TACROLIMUS 2.5 MG: 1 CAPSULE ORAL at 08:25

## 2019-03-27 RX ADMIN — Medication 10 ML: at 17:43

## 2019-03-27 RX ADMIN — SILDENAFIL 20 MG: 20 TABLET ORAL at 19:28

## 2019-03-27 RX ADMIN — MONTELUKAST SODIUM 10 MG: 10 TABLET, COATED ORAL at 21:38

## 2019-03-27 RX ADMIN — FLUTICASONE PROPIONATE 2 PUFF: 220 AEROSOL, METERED RESPIRATORY (INHALATION) at 08:38

## 2019-03-27 RX ADMIN — SENNOSIDES AND DOCUSATE SODIUM 2 TABLET: 8.6; 5 TABLET ORAL at 08:24

## 2019-03-27 RX ADMIN — FLUTICASONE PROPIONATE 2 PUFF: 220 AEROSOL, METERED RESPIRATORY (INHALATION) at 19:28

## 2019-03-27 RX ADMIN — Medication 500000 UNITS: at 16:21

## 2019-03-27 RX ADMIN — SULFAMETHOXAZOLE AND TRIMETHOPRIM 1 TABLET: 800; 160 TABLET ORAL at 08:25

## 2019-03-27 RX ADMIN — ROSUVASTATIN CALCIUM 10 MG: 10 TABLET, FILM COATED ORAL at 08:25

## 2019-03-27 RX ADMIN — PANTOPRAZOLE SODIUM 40 MG: 40 TABLET, DELAYED RELEASE ORAL at 08:25

## 2019-03-27 RX ADMIN — PREDNISONE 15 MG: 10 TABLET ORAL at 08:24

## 2019-03-27 RX ADMIN — BUMETANIDE 3 MG: 0.25 INJECTION INTRAMUSCULAR; INTRAVENOUS at 11:21

## 2019-03-27 RX ADMIN — MESALAMINE 2400 MG: 800 TABLET, DELAYED RELEASE ORAL at 08:24

## 2019-03-27 RX ADMIN — MYCOPHENOLATE MOFETIL 1500 MG: 500 TABLET, FILM COATED ORAL at 08:25

## 2019-03-27 RX ADMIN — MELATONIN TAB 3 MG 3 MG: 3 TAB at 19:26

## 2019-03-27 RX ADMIN — Medication 500000 UNITS: at 19:28

## 2019-03-27 RX ADMIN — Medication 1 CAPSULE: at 08:25

## 2019-03-27 RX ADMIN — Medication 500000 UNITS: at 13:22

## 2019-03-27 RX ADMIN — MESALAMINE 2400 MG: 800 TABLET, DELAYED RELEASE ORAL at 19:28

## 2019-03-27 RX ADMIN — Medication 2.5 MG: at 22:43

## 2019-03-27 RX ADMIN — RANITIDINE 150 MG: 150 TABLET ORAL at 08:25

## 2019-03-27 RX ADMIN — MYCOPHENOLATE MOFETIL 1500 MG: 500 TABLET, FILM COATED ORAL at 17:37

## 2019-03-27 RX ADMIN — MELATONIN TAB 3 MG 3 MG: 3 TAB at 19:35

## 2019-03-27 RX ADMIN — SILDENAFIL 20 MG: 20 TABLET ORAL at 13:22

## 2019-03-27 RX ADMIN — SENNOSIDES AND DOCUSATE SODIUM 1 TABLET: 8.6; 5 TABLET ORAL at 19:28

## 2019-03-27 RX ADMIN — ACETAMINOPHEN 650 MG: 325 TABLET, FILM COATED ORAL at 16:20

## 2019-03-27 RX ADMIN — Medication 500000 UNITS: at 08:25

## 2019-03-27 RX ADMIN — LEVOTHYROXINE SODIUM 100 MCG: 100 TABLET ORAL at 08:24

## 2019-03-27 RX ADMIN — SILDENAFIL 20 MG: 20 TABLET ORAL at 08:38

## 2019-03-27 RX ADMIN — POTASSIUM CHLORIDE 20 MEQ: 750 TABLET, EXTENDED RELEASE ORAL at 11:20

## 2019-03-27 RX ADMIN — PREDNISONE 10 MG: 10 TABLET ORAL at 19:26

## 2019-03-27 RX ADMIN — TACROLIMUS 2.5 MG: 1 CAPSULE ORAL at 17:38

## 2019-03-27 RX ADMIN — DULOXETINE 20 MG: 20 CAPSULE, DELAYED RELEASE ORAL at 08:24

## 2019-03-27 ASSESSMENT — ACTIVITIES OF DAILY LIVING (ADL)
ADLS_ACUITY_SCORE: 13
ADLS_ACUITY_SCORE: 13
ADLS_ACUITY_SCORE: 14
ADLS_ACUITY_SCORE: 14
ADLS_ACUITY_SCORE: 12
ADLS_ACUITY_SCORE: 13

## 2019-03-27 ASSESSMENT — PAIN DESCRIPTION - DESCRIPTORS
DESCRIPTORS: DISCOMFORT
DESCRIPTORS: DULL
DESCRIPTORS: DISCOMFORT

## 2019-03-27 ASSESSMENT — MIFFLIN-ST. JEOR: SCORE: 1375.13

## 2019-03-27 NOTE — PROGRESS NOTES
Nephrology Progress Note  March 24, 2019        ASSESSMENT AND RECOMMENDATIONS:   Mr. Larios is a 53 year old male with history of chronic renal insufficiency, EtOH abuse, GIB w/splenic embolization, PAF s/p ablation and cardioversion, NICM c/b cardiogenic shock requiring IABP, s/p OHT (2/24/19) on tacrolimus and MMF, complicated by RV dysfunction per echo and acute oliguric kidney injury.      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 Baseline Crt 1.1- 1.4 over the last 6 months; however, prior BMPs have demonstrated patient Cr as high as 2.0 mg/dl. He most likely has a baseline level of CKD due to renal artery stenosis (longstanding smoking history, abdominal bruit on physical examination) and possible smaller FRANKLIN episodes from HFrEF exacerbations. Current rise in creatinine started 3/1 at 1.89, and increasing to 4.01 3/7 and oliguria is most likely due to nephrotoxicity associated with tacrolimus (a renal vasoconstrictor), and diuretic use. UOP improved but have not been measured accurately due to urination with frequent bowel movements.Suspect he could have some recovery of function. However, he had continued to have anephric rise in serum creatinine between dialysis treatments.   - Patient was last dialyzed on 03/23  -avoid nephrotoxins including contrast and NSAID's  -renally dose medications  - Cr stable , UOP improved, will plan in using diuretics today  - will hold on dialysis reassess need daily    # S/p OHT -on IS with MMF, Tacrolimus. Tac level was 8.4 this am. Managed by transplant team.      #Electrolytes:  -mild hyponatremia . Improved to 131 this am. Will consider fluid restriction if get worse.   -hypokalemia today please replace.       #Acid Base status- AGMA likely secondary to FRANKLIN. Consider oral bicarb 650mg tid    #BP/Volume status:Acceptable BP. Patient appears euvolemic on exam. Will  target EDW ~54 kgs  -daily weight, strict I/O  -- use bumex 3mg now for volume if no response will start HD again and UF with dialysis     #Anemia Hgb7.5     #Thrombocytopenia: PLT improving      Recommendations were communicated to primary team via phone.  Patient seen and discussed  with Dr. Beverly Lewis  Nephrology Fellow  Pager: [phone]      Interval History :   Nursing and provider notes from last 24 hours reviewed.  Feels fine he think his UOP improved, no major events overnight, his kidney function, BP stable, no favre no nausea or vomiting no chest pain still with sob and Echo shows still volume overload, no new issue.     MEDICATIONS:  Current Meds    DULoxetine  20 mg Oral Daily     fluticasone  2 puff Inhalation Q12H     heparin lock flush  5-15 mL Intracatheter Q24H     insulin aspart  1-6 Units Subcutaneous 4x Daily AC & HS     levothyroxine  100 mcg Oral Daily     lidocaine  1 patch Transdermal Q24H     lidocaine   Transdermal Q8H     lidocaine   Transdermal Q24h     LORazepam  0.5 mg Oral Once     melatonin  3-9 mg Oral At Bedtime     mesalamine  2,400 mg Oral BID     montelukast  10 mg Oral At Bedtime     multivitamin RENAL  1 capsule Oral Daily     [START ON 3/27/2019] mycophenolate  1,500 mg Oral BID IS     nystatin  500,000 Units Oral 4x Daily     pantoprazole  40 mg Oral BID     predniSONE  10 mg Oral QPM     predniSONE  15 mg Oral Daily     ranitidine  150 mg Oral Daily     rosuvastatin  10 mg Oral Daily     senna-docusate  1 tablet Oral BID    Or     senna-docusate  2 tablet Oral BID     sildenafil  20 mg Oral TID     sodium chloride (PF)  10 mL Intracatheter Q8H     sodium chloride (PF)  3 mL Intracatheter Q8H     sulfamethoxazole-trimethoprim  1 tablet Oral Daily     tacrolimus  2.5 mg Oral QPM     [START ON 3/27/2019] tacrolimus  2.5 mg Oral QAM     valGANciclovir  450 mg Oral Once per day on Mon Fri     Infusion Meds    IV fluid REPLACEMENT ONLY       - MEDICATION  "INSTRUCTIONS -       BETA BLOCKER NOT PRESCRIBED       REVIEW OF SYSTEMS:   ROS: 10 point ROS neg other than the symptoms noted above in the HPI.      PHYSICAL EXAM:   Temp  Av.9  F (36.6  C)  Min: 95.9  F (35.5  C)  Max: 100  F (37.8  C)  Arterial Line BP  Min: 68/56  Max: 182/36  Arterial Line BP 2  Min: 90/54  Max: 107/56  Arterial Line MAP (mmHg)  Av.7 mmHg  Min: 57 mmHg  Max: 123 mmHg Arterial Line Location 2: Right femoral    Pulse  Av.5  Min: 59  Max: 140 Resp  Av.1  Min: 8  Max: 45  FiO2 (%)  Av %  Min: 40 %  Max: 100 %  SpO2  Av.1 %  Min: 88 %  Max: 100 %    CVP (mmHg): 21 mmHg  /84 (BP Location: Right arm)   Pulse 81   Temp 98.1  F (36.7  C) (Oral)   Resp 18   Ht 1.727 m (5' 8\")   Wt 58 kg (127 lb 14.4 oz)   SpO2 100%   BMI 19.45 kg/m     Date 19 0700 - 19 0659   Shift 9932-2877 5739-6933 5692-5442 24 Hour Total   INTAKE   P.O. 100   100   I.V. 70.4   70.4   Shift Total(mL/kg) 170.4(2.74)   170.4(2.74)   OUTPUT   Chest Tube(mL/kg) 238(3.83)   238(3.83)   Shift Total(mL/kg) 238(3.83)   238(3.83)   Weight (kg) 62.1 62.1 62.1 62.1      Admit Weight: 63.5 kg (140 lb 1.6 oz)     GENERAL APPEARANCE: Alert, NAD  Lymphatics: no cervical LAD  HEENT: NJT in place, sclerae anicteric  Pulmonary: lungs clear to auscultation with equal breath sounds bilaterally  CV: regular rhythm, normal rate   - Edema: None  GI: soft, nontender, normal bowel sounds.  SKIN: no rash, warm - R Chest Dressing in place  NEURO: face symmetric, AO x 3    LABS:   I personally reviewed the labs.     CMP  Recent Labs   Lab 19  1634 19  0531 19  0612 19  0552 19  0609   NA  --  131* 132* 132* 131*   POTASSIUM 3.5 3.1* 3.4 3.8 5.0   CHLORIDE  --  98 97 97 96   CO2  --  18* 18* 24 17*   ANIONGAP  --  15* 16* 11 18*   GLC  --  133* 115* 98 100*   BUN  --  74* 78* 68* 98*   CR  --  2.61* 2.64* 2.07* 3.00*   GFRESTIMATED  --  26* 26* 35* 22*   GFRESTBLACK  --  30* " 30* 40* 26*   RADAMES  --  7.8* 8.0* 8.0* 8.4*   MAG  --  2.3 2.3 2.3 2.5*   PHOS  --  4.4 4.7* 4.7* 6.8*   PROTTOTAL  --  5.0*  --   --   --    ALBUMIN  --  2.4*  --   --   --    BILITOTAL  --  0.5  --   --   --    ALKPHOS  --  110  --   --   --    AST  --  16  --   --   --    ALT  --  23  --   --   --      CBC  Recent Labs   Lab 03/26/19  0531 03/25/19  0612 03/24/19  0705 03/24/19  0552 03/23/19  0609   HGB 7.5* 7.6* 6.9* 6.8* 7.7*   WBC 4.2 5.0  --  5.0 7.6   RBC 2.51* 2.50*  --  2.24* 2.51*   HCT 24.3* 24.2*  --  22.1* 24.7*   MCV 97 97  --  99 98   MCH 29.9 30.4  --  30.4 30.7   MCHC 30.9* 31.4*  --  30.8* 31.2*   RDW 19.0* 19.3*  --  19.6* 19.2*    135*  --  115* 114*     INR  Recent Labs   Lab 03/26/19  0531 03/25/19  0612 03/24/19  0552 03/23/19  0609   INR 1.38* 1.41* 1.40* 1.35*     ABG  No lab results found in last 7 days.   URINE STUDIES  Recent Labs   Lab Test 03/26/19  1707 03/17/19  0045 03/04/19  1303 02/23/19  1921   COLOR Yellow Yellow Yellow Yellow   APPEARANCE Clear Slightly Cloudy Clear Clear   URINEGLC Negative 30* Negative Negative   URINEBILI Negative Negative Negative Negative   URINEKETONE Negative Negative Negative Negative   SG 1.014 1.016 1.014 1.008   UBLD Moderate* Small* Negative Negative   URINEPH 5.5 5.5 5.0 6.0   PROTEIN 30* 100* Negative Negative   NITRITE Negative Negative Negative Negative   LEUKEST Negative Negative Negative Negative   RBCU 134* 14* 0 <1   WBCU 2 15* 0 <1     No lab results found.  PTH  No lab results found.  IRON STUDIES  Recent Labs   Lab Test 11/15/18  0955 10/11/18  1235   IRON  --  63   FEB  --  457*   IRONSAT  --  14*   NATE 1,045*  --        IMAGING:  Reviewed      I, Abhijeet Paul, saw this patient with Dr. Lewis and agree with the findings and plan of care as documented in the note.

## 2019-03-27 NOTE — PLAN OF CARE
D: Admitted 2/17 with HF. S/p OHT 2/24. Hx: NICM s/p ICD, ASD repair as child, pAfib s/p ablation, ulcerative colitis.     I: Monitored vitals and assessed pt status.   Changed: Abd US completed 3/27. Continues to have abdominal pain throughout shift but reports slight improvement. IV Bumex once 3/26 (held off on dialysis). Biopsy results show possible AMR, cellcept dose increased, labs ordered and pending this AM. Continues to bleed minimally from penis but urine has remained clear and yellow overnight.   Running: N/A  PRN: Tylenol and Oxycodone every 4 hours for right lower rib cage pain.     A: VSS, A&O, up with 2 and walker.  Reports MATA, oxygen stable on RA. Tele SR, HR 80-90's. Pt weak, requires strong assist of two to get up from chair. Once up and walking moves well. Noted to have blanchable redness to coccyx yesterday. Mepilex remains in place, continue to encourage frequent position changes. Reports abd discomfort throughout shift, denies nausea. Adequate urine output throughout shift.    Temp:  [97.9  F (36.6  C)-98.3  F (36.8  C)] 97.9  F (36.6  C)  Heart Rate:  [77-88] 77  Resp:  [16-18] 18  BP: (110-122)/(71-84) 110/75  Cuff Mean (mmHg):  [96] 96  SpO2:  [99 %-100 %] 99 %      P: Will continue to monitor and report changes to treatment team.

## 2019-03-27 NOTE — PROVIDER NOTIFICATION
MD Notification    MD Notified: Fredrick GARCIA    Notification date/time: 03/27/19 0700    Notification interaction: Spoke with PA    Purpose of notification: Troponin 0.198    Comments: No orders received at this time. PA stated he would talk with cardiology. Will continue to monitor and notify MD of pertinent changes.

## 2019-03-27 NOTE — PROGRESS NOTES
Nephrology Progress Note  March 24, 2019        ASSESSMENT AND RECOMMENDATIONS:   Mr. Larios is a 53 year old male with history of chronic renal insufficiency, EtOH abuse, GIB w/splenic embolization, PAF s/p ablation and cardioversion, NICM c/b cardiogenic shock requiring IABP, s/p OHT (2/24/19) on tacrolimus and MMF, complicated by RV dysfunction per echo and acute oliguric kidney injury.      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 Baseline Crt 1.1- 1.4 over the last 6 months; however, prior BMPs have demonstrated patient Cr as high as 2.0 mg/dl. He most likely has a baseline level of CKD due to renal artery stenosis (longstanding smoking history, abdominal bruit on physical examination) and possible smaller FRANKLIN episodes from HFrEF exacerbations. Current rise in creatinine started 3/1 at 1.89, and increasing to 4.01 3/7 and oliguria is most likely due to nephrotoxicity associated with tacrolimus (a renal vasoconstrictor), and diuretic use. UOP improved but have not been measured accurately due to urination with frequent bowel movements.Suspect he could have some recovery of function. However, he had continued to have anephric rise in serum creatinine between dialysis treatments.   - Patient was last dialyzed on 03/23  -avoid nephrotoxins including contrast and NSAID's  -renally dose medications  - Cr stable , UOP improved, cont diuretics for volume.  - will hold on dialysis reassess need daily  Keep dialysis cathter for another day or two but most likely will need to discontinue soon if no further dialysis     # S/p OHT -on IS with MMF, Tacrolimus. Tac level was 7.7 this am. Managed by transplant team.      #Electrolytes:  -mild hyponatremia . Improved to 133 this am. Will consider fluid restriction if get worse.   -hypokalemia resolved.        #Acid Base status- AGMA likely secondary to FRANKLIN. Consider  oral bicarb 650mg tid    #BP/Volume status:Acceptable BP. Patient appears euvolemic on exam. Will target EDW ~54 kgs, echo done shows still fluid overload   -daily weight, strict I/O  -- good response to Bumex dose, cont the same , no plan for dialysis      #Anemia Hgb7.6     #Thrombocytopenia: PLT improving      Recommendations were communicated to primary team via phone.  Patient seen and discussed  with Dr. Beverly Lewis  Nephrology Fellow  Pager: [phone]      Interval History :   Nursing and provider notes from last 24 hours reviewed.  No major events over night, good response to Bumex, UOP improved, CR stable, BP stable , no other complaint., 4 point ROS neg other than the symptoms noted above.    MEDICATIONS:  Current Meds    DULoxetine  20 mg Oral Daily     fluticasone  2 puff Inhalation Q12H     heparin lock flush  5-15 mL Intracatheter Q24H     insulin aspart  1-6 Units Subcutaneous 4x Daily AC & HS     levothyroxine  100 mcg Oral Daily     lidocaine  1 patch Transdermal Q24H     lidocaine   Transdermal Q8H     lidocaine   Transdermal Q24h     LORazepam  0.5 mg Oral Once     melatonin  3-9 mg Oral At Bedtime     mesalamine  2,400 mg Oral BID     montelukast  10 mg Oral At Bedtime     multivitamin RENAL  1 capsule Oral Daily     mycophenolate  1,500 mg Oral BID IS     nystatin  500,000 Units Oral 4x Daily     pantoprazole  40 mg Oral BID     predniSONE  10 mg Oral QPM     predniSONE  15 mg Oral Daily     ranitidine  150 mg Oral Daily     rosuvastatin  10 mg Oral Daily     senna-docusate  1 tablet Oral BID    Or     senna-docusate  2 tablet Oral BID     sildenafil  20 mg Oral TID     sodium chloride (PF)  10 mL Intracatheter Q8H     sodium chloride (PF)  3 mL Intracatheter Q8H     sulfamethoxazole-trimethoprim  1 tablet Oral Daily     tacrolimus  2.5 mg Oral QPM     tacrolimus  2.5 mg Oral QAM     valGANciclovir  450 mg Oral Once per day on Mon Fri     Infusion Meds    IV fluid REPLACEMENT ONLY  "      - MEDICATION INSTRUCTIONS -       BETA BLOCKER NOT PRESCRIBED       REVIEW OF SYSTEMS:  4 point ROS neg other than the symptoms noted above in the HPI.      PHYSICAL EXAM:   Temp  Av.9  F (36.6  C)  Min: 95.9  F (35.5  C)  Max: 100  F (37.8  C)  Arterial Line BP  Min: 68/56  Max: 182/36  Arterial Line BP 2  Min: 90/54  Max: 107/56  Arterial Line MAP (mmHg)  Av.7 mmHg  Min: 57 mmHg  Max: 123 mmHg Arterial Line Location 2: Right femoral    Pulse  Av.5  Min: 59  Max: 140 Resp  Av.1  Min: 8  Max: 45  FiO2 (%)  Av %  Min: 40 %  Max: 100 %  SpO2  Av.1 %  Min: 88 %  Max: 100 %    CVP (mmHg): 21 mmHg  /79 (BP Location: Left arm)   Pulse 84   Temp 97.9  F (36.6  C) (Oral)   Resp 18   Ht 1.727 m (5' 8\")   Wt 57.1 kg (125 lb 12.8 oz)   SpO2 99%   BMI 19.13 kg/m     Date 19 0700 - 19 0659   Shift 8067-3985 4521-7568 6421-7564 24 Hour Total   INTAKE   P.O. 100   100   I.V. 70.4   70.4   Shift Total(mL/kg) 170.4(2.74)   170.4(2.74)   OUTPUT   Chest Tube(mL/kg) 238(3.83)   238(3.83)   Shift Total(mL/kg) 238(3.83)   238(3.83)   Weight (kg) 62.1 62.1 62.1 62.1      Admit Weight: 63.5 kg (140 lb 1.6 oz)     GENERAL APPEARANCE: Alert, NAD  Lymphatics: no cervical LAD  HEENT: NJT in place, sclerae anicteric  Pulmonary: lungs clear to auscultation with equal breath sounds bilaterally  CV: regular rhythm, normal rate   - Edema: trace  GI: soft, nontender, normal bowel sounds.  SKIN: no rash, warm - R Chest Dressing in place  NEURO: face symmetric, AO x 3    LABS:   I personally reviewed the labs.     CMP  Recent Labs   Lab 19  0521 19  1634 19  0531 19  0612 19  0552     --  131* 132* 132*   POTASSIUM 3.5 3.5 3.1* 3.4 3.8   CHLORIDE 100  --  98 97 97   CO2 20  --  18* 18* 24   ANIONGAP 13  --  15* 16* 11   *  --  133* 115* 98   BUN 76*  --  74* 78* 68*   CR 2.47*  --  2.61* 2.64* 2.07*   GFRESTIMATED 28*  --  26* 26* 35*   GFRESTBLACK " 33*  --  30* 30* 40*   RADAMES 7.9*  --  7.8* 8.0* 8.0*   MAG 2.2  --  2.3 2.3 2.3   PHOS 3.6  --  4.4 4.7* 4.7*   PROTTOTAL  --   --  5.0*  --   --    ALBUMIN  --   --  2.4*  --   --    BILITOTAL  --   --  0.5  --   --    ALKPHOS  --   --  110  --   --    AST  --   --  16  --   --    ALT  --   --  23  --   --      CBC  Recent Labs   Lab 03/27/19  0521 03/26/19  0531 03/25/19  0612 03/24/19  0705 03/24/19  0552   HGB 7.6* 7.5* 7.6* 6.9* 6.8*   WBC 3.9* 4.2 5.0  --  5.0   RBC 2.52* 2.51* 2.50*  --  2.24*   HCT 24.4* 24.3* 24.2*  --  22.1*   MCV 97 97 97  --  99   MCH 30.2 29.9 30.4  --  30.4   MCHC 31.1* 30.9* 31.4*  --  30.8*   RDW 19.4* 19.0* 19.3*  --  19.6*    161 135*  --  115*     INR  Recent Labs   Lab 03/27/19  0521 03/26/19  0531 03/25/19  0612 03/24/19  0552   INR 1.39* 1.38* 1.41* 1.40*     ABG  No lab results found in last 7 days.   URINE STUDIES  Recent Labs   Lab Test 03/26/19  1707 03/17/19  0045 03/04/19  1303 02/23/19  1921   COLOR Yellow Yellow Yellow Yellow   APPEARANCE Clear Slightly Cloudy Clear Clear   URINEGLC Negative 30* Negative Negative   URINEBILI Negative Negative Negative Negative   URINEKETONE Negative Negative Negative Negative   SG 1.014 1.016 1.014 1.008   UBLD Moderate* Small* Negative Negative   URINEPH 5.5 5.5 5.0 6.0   PROTEIN 30* 100* Negative Negative   NITRITE Negative Negative Negative Negative   LEUKEST Negative Negative Negative Negative   RBCU 134* 14* 0 <1   WBCU 2 15* 0 <1     No lab results found.  PTH  No lab results found.  IRON STUDIES  Recent Labs   Lab Test 11/15/18  0955 10/11/18  1235   IRON  --  63   FEB  --  457*   IRONSAT  --  14*   NATE 1,045*  --        IMAGING:  Reviewed    I, Abhijeet Paul, saw this patient with Dr. Lewis and agree with the findings and plan of care as documented in the note.

## 2019-03-27 NOTE — PROGRESS NOTES
Calorie Count  Intake recorded for: 3/26  Total Kcals: 647 Total Protein: 43g  Kcals from Hospital Food: 647  Protein: 43g  Kcals from Outside Food (average):0 Protein: 0g  # Meals Recorded: 100% grapes, 75% omelet w/ sausage & cheese  # Supplements Recorded: 100% 1 Gelatein Plus

## 2019-03-27 NOTE — PROGRESS NOTES
Cross-cover note: 6:34 PM 3/27/2019     Was called by nursing regarding patient slipping while using the bathroom.     Patient was standing up using the urinal, in the bathroom, and fell backward onto the toilet. He states he was briefly slumped over forward but denies hitting his head. No new deficits or new aches/pains anywhere. He does not have a headache, feel dizzy or lightheaded. No chest pain, shortness of breath, nausea/vomiting, fever/chills.    B/P: 112/79, T: 98, P: 84, R: 18    PE:  Awake, alert, and oriented to person, place, time, and situation. Sitting up in bed, no acute distress, appears comfortable and well.  Breathing non-labored on room air  RRR, strong pulses  Abdomen is soft, non-distended, non-tender  Extremities are warm and well perfused, no edema  Neuro intact, no gross cranial nerve deficiencies, moving all extremities equally    Assessment: Mr. Larios is a 53 year-old man with history of chronic renal insufficiency, EtOH abuse, GIB w/splenic embolization, paroxysmal Afib s/p ablation and cardioversion, NICM c/b cardiogenic shock requiring IABP, s/p OHT on 2/24/19 c/b RV dysfunction and FRANKLIN. He likely had a brief vasovagal syncopal episode. Vitals are stable and WNL. Mentation is intact without any neurological deficits.    Plan:  - When up to bathroom, patient will call for nursing staff for assistance and have nursing stay with patient in bathroom for monitoring  - Have urinal at bedside  - No need for labs or imaging at this time  - Will continue to monitor closely, patient is in agreement with the plan    Please call with any questions or concerns.    Frank Toth MD ([id number])  General Surgery Resident  Surgery [facility]  P: [phone]

## 2019-03-27 NOTE — SUMMARY OF CARE
-Pt has a right sided heart cath and biopsy scheduled for tomorrow and will remain NPO at midnight tonight.  -Pt requires the assistance of one person to stand and stand-by assist to ambulate with a walker.  -Pt showered this morning.

## 2019-03-27 NOTE — PROGRESS NOTES
[facility]   Cardiology II Service / Advanced Heart Failure  Daily Consult Note      Patient: Everton Larios  MRN: [id number]  Admission Date: 2/17/2019  Hospital Day # 38    Assessment and Plan: Everton Larios is a 54 year old male with history of hypothyroidism, asthma, UC, depression, etoh abuse, s/p orthotopic heart transplant 2/24/19 for non ischemic cardiomyopathy and cardiogenic shock requiring IABP. Post-op complicated by RV dysfunction and FRANKLIN requiring HD.     Recommendations:  -continue current tac dose with daily levels  -monitor DSA results  -repeat bx with RHC tomorrow given concern for AMR, NPO at midnight  -agree with IV Bumex today  -add on urine culture (ordered for you)    # Status post OHT on 2/24/19, history of NICM  # Chronic immunosuppression  * TTE 3/5/19: EF 60-65%, mild-mod RV dilation, global RV function mildly reduced, moderate TR, IVC normal without collapse   * RHC 3/25/19: RA 12, PA 33/20(25), PCWP 20, Jaden CO/CI 4.9/2.9  * TTE 3/26/19: EF 60-65%, moderate LVH, RV mod dilated and mod reduced function, IVC dilated    Volume Status/Graft Function:   --Volume: hypervolemic, rec Bumex IV 3 mg once or twice  --BP: at goal not on Rx   --HR: 70s-80s  --RV support: sildenafil 20 mg tid, continue for now    Immunosuppression:   --did not receive Simulect  --continue prednisone per taper with negative bx, currently 15/10 mg   --increased Cellcept 1500 mg BID per protocol 3/26  --tacrolimus 2.5 mg bid, goal 8-10, 7.7 today, daily levels    Prophylaxis:   --CAV: holding aspirin 81 mg given bleeding post-op, rosuvastatin 10 mg daily  --Thrush: Nystatin swish and swallow  --PCP: pentamidine given 3/3, due 4/1 unless can start Bactrim for ppx  --GI: Protonix   --CMV: D+/R+, valcyte renally dosed through May, weekly CMV PCR for now per ID through mid April then per protocol, due 4/1  --Osteoporosis: calcium/vitamin D      Serostatus: CMV: D+/R+. EBV: D+/R+    Biopsies:   --#1  "3/4: NER  --#2 3/11: NER  --#3 3/18: NER  --#4 3/25: 0R, +pAMR 1 +staining for c4d, graft function normal by echo but septum thick  --#5 due 4/8  *No biopsies or central line access on left due to SVC graft    #FRANKLIN on iHD, oliguric, improving  -renal managing  -agree with IV Bumex     #Klebsiella in sputum  Opacity by CXR 3/22 and GGO in right ling 3/19. S/p cefepime/zosyn x 7 days (-3/16), improved clinically. Now on 7d course of oral Bactrim.   -monitor resp symptoms     ================================================================    Subjective/24-Hr Events:   Last 24 hr care team notes reviewed. No overnight events. No exertional chest pain, shortness of breath. Noted some RUQ pain, had abd US per CVTS. Improved today. Denies signs of fluid retentions.     ROS:  4 point ROS including respiratory, CV, GI and  (other than that noted in the HPI) is negative.     Medications: Reviewed in EPIC.     Physical Exam:   /90 (BP Location: Left arm)   Pulse 84   Temp 98  F (36.7  C) (Oral)   Resp 18   Ht 1.727 m (5' 8\")   Wt 57.1 kg (125 lb 12.8 oz)   SpO2 99%   BMI 19.13 kg/m      GENERAL: Appears comfortable, in no distress.  HEENT: Eye symmetrical, no discharge or icterus bilaterally. Mucous membranes moist and without lesions.  NECK: Supple, JVD 3cm above clavicle at 90 degrees.   CV: RRR, +S1S2, no murmur, rub, or gallop.   RESPIRATORY: Respirations regular, even, and unlabored. Lungs CTA throughout.   GI: Soft and non distended with normoactive bowel sounds present in all quadrants. No tenderness, rebound, guarding.   EXTREMITIES: Trace ankle peripheral edema. 2+ bilateral pedal pulses.   NEUROLOGIC: Alert and oriented x 3. No focal deficits.   MUSCULOSKELETAL: No joint swelling or tenderness.   SKIN: No jaundice. No rashes or lesions. Sternum stable.     Labs:  CMP  Recent Labs   Lab 03/27/19  0521 03/26/19  1634 03/26/19  0531 03/25/19  0612 03/24/19  0552     --  131* 132* 132*   POTASSIUM " 3.5 3.5 3.1* 3.4 3.8   CHLORIDE 100  --  98 97 97   CO2 20  --  18* 18* 24   ANIONGAP 13  --  15* 16* 11   *  --  133* 115* 98   BUN 76*  --  74* 78* 68*   CR 2.47*  --  2.61* 2.64* 2.07*   GFRESTIMATED 28*  --  26* 26* 35*   GFRESTBLACK 33*  --  30* 30* 40*   RADAMES 7.9*  --  7.8* 8.0* 8.0*   MAG 2.2  --  2.3 2.3 2.3   PHOS 3.6  --  4.4 4.7* 4.7*   PROTTOTAL  --   --  5.0*  --   --    ALBUMIN  --   --  2.4*  --   --    BILITOTAL  --   --  0.5  --   --    ALKPHOS  --   --  110  --   --    AST  --   --  16  --   --    ALT  --   --  23  --   --        CBC  Recent Labs   Lab 03/27/19 0521 03/26/19 0531 03/25/19 0612 03/24/19  0705 03/24/19  0552   WBC 3.9* 4.2 5.0  --  5.0   RBC 2.52* 2.51* 2.50*  --  2.24*   HGB 7.6* 7.5* 7.6* 6.9* 6.8*   HCT 24.4* 24.3* 24.2*  --  22.1*   MCV 97 97 97  --  99   MCH 30.2 29.9 30.4  --  30.4   MCHC 31.1* 30.9* 31.4*  --  30.8*   RDW 19.4* 19.0* 19.3*  --  19.6*    161 135*  --  115*       INR  Recent Labs   Lab 03/27/19 0521 03/26/19 0531 03/25/19  0612 03/24/19  0552   INR 1.39* 1.38* 1.41* 1.40*       Time/Communication  I personally spent a total of 25 minutes. Of that 15 minutes was counseling/coordination of patient's care. Plan of care discussed with patient. See my note above for details.    Patient discussed with Dr. Beck.      Candida Srivastava DNP, NP-C  Advanced Heart Failure/Cardiology II Service  Pager [phone]

## 2019-03-27 NOTE — PROGRESS NOTES
CVTS Daily Note  3/27/2019  Attending: Dewayne House, *    S:   No overnight events. More UOP yesterday than before, had Bumex 3 mg IV.   Pt seen at bedside resting comfortably.    Does complain of improved R sided subcostal pain after therapy session yesterday. Seems to be getting a little better.   RUQ- stable since surgery, mild, not necessarily associated with eating, no nausea or fevers.    Denies F/C/Sweats.  No CP, SOB, or calf pain.    Tolerating diet better (600 calories) since removing tube feeds.  + BM.  + Flatus.    Ambulated well without assistance.    Pain level tolerable. Plan as per Neuro section below.     - 6 kg since admit and trending stable x 7 days    O:   Vitals:    03/26/19 2351 03/27/19 0346 03/27/19 0551 03/27/19 0717   BP: 110/75   113/78   BP Location: Left arm   Left arm   Pulse:       Resp: 16 18 18   Temp: 97.9  F (36.6  C)   98  F (36.7  C)   TempSrc: Oral   Oral   SpO2: 99%   99%   Weight:   57.1 kg (125 lb 12.8 oz)    Height:         Vitals:    03/23/19 1800 03/25/19 0300 03/27/19 0551   Weight: 55.7 kg (122 lb 12.8 oz) 58 kg (127 lb 14.4 oz) 57.1 kg (125 lb 12.8 oz)       Intake/Output Summary (Last 24 hours) at 3/27/2019 0836  Last data filed at 3/27/2019 0722  Gross per 24 hour   Intake 1080 ml   Output 1078.5 ml   Net 1.5 ml       MAPs: 89 - 97   Gen: AAO x 3, pleasant, NAD  Skin: multiple sites with bruising   CV: RRR, S1S2 normal, no murmurs, rubs, or gallops.   Pulm: CTA, no rhonchi or wheezes  Abd: soft, non-tender, no guarding  Ext: no peripheral edema  Incision: clean, dry, intact, no erythema  Chest Tube sites: dressings clean and dry      Labs:  BMP  Recent Labs   Lab 03/27/19  0521 03/26/19  1634 03/26/19  0531 03/25/19  0612 03/24/19  0552     --  131* 132* 132*   POTASSIUM 3.5 3.5 3.1* 3.4 3.8   CHLORIDE 100  --  98 97 97   RADAMES 7.9*  --  7.8* 8.0* 8.0*   CO2 20  --  18* 18* 24   BUN 76*  --  74* 78* 68*   CR 2.47*  --  2.61* 2.64* 2.07*   *  --   133* 115* 98     CBC  Recent Labs   Lab 03/27/19  0521 03/26/19  0531 03/25/19  0612 03/24/19  0705 03/24/19  0552   WBC 3.9* 4.2 5.0  --  5.0   RBC 2.52* 2.51* 2.50*  --  2.24*   HGB 7.6* 7.5* 7.6* 6.9* 6.8*   HCT 24.4* 24.3* 24.2*  --  22.1*   MCV 97 97 97  --  99   MCH 30.2 29.9 30.4  --  30.4   MCHC 31.1* 30.9* 31.4*  --  30.8*   RDW 19.4* 19.0* 19.3*  --  19.6*    161 135*  --  115*     INR  Recent Labs   Lab 03/27/19  0521 03/26/19  0531 03/25/19  0612 03/24/19  0552   INR 1.39* 1.38* 1.41* 1.40*      Hepatic Panel   Lab Results   Component Value Date    AST 16 03/26/2019     Lab Results   Component Value Date    ALT 23 03/26/2019     Lab Results   Component Value Date    ALBUMIN 2.4 03/26/2019     GLUCOSE:   Recent Labs   Lab 03/27/19  0721 03/27/19  0521 03/27/19  0210 03/26/19  2145 03/26/19  1825 03/26/19  1242 03/26/19  0727 03/26/19  0531  03/25/19  0612  03/24/19  0552  03/23/19  0609  03/22/19  0728   GLC  --  130*  --   --   --   --   --  133*  --  115*  --  98  --  100*  --  107*   *  --  153* 191* 108* 100* 137*  --    < >  --    < >  --    < >  --    < >  --     < > = values in this interval not displayed.       Imaging:  reviewed recent imaging, some biliary sludge and mild GB wall thickening      A/P:  Everton Larios is a 53 year old male with PMH etoh abuse, hypothyroidism, intermittent asthma, ulcerative colitis, Depression, Chronic HFrEF, NICM admitted with cardiogenic shock requiring subclavian balloon pump. He is now s/p OHT 2/24/19 with Piyush House and Christopher.    - RHC and biopsy 3/4; 0R, no AMR  - RHC and biopsy 3/11; 0R, no AMR  - RHC and biopsy 3/18; 1R, no AMR   - RHC and biopsy 3/25;  0R, biopsy suggestive of AMR.   - RHC and biopsy 3/28; pending     Neuro:   - Neuro intact. ICU delirium improving. Head CT 3/9 without acute pathology.   - Hx Depression: pta cymbalta  - Sleep: melatonin   - Tylenol and oxycodone  -  tramadol      CV:   - Hx of NICM, now S/P heart  transplant 2/24. No arrhythmia, HD stable.  - ASA 81 mg, Crestor. Sildenafil 20 mg TID.   - Immunosuppression per cardiology   - RHC 3/25 Mild elevated filling pressures. Clinically stable off dobutamine.   - Removed lena drain to right pacer pocket 3/26.      Pulm:   - Hx intermittent asthma, pta Singulair.   - Pulm toilet, IS, activity and deep breathing   - Supplemental O2 PRN to keep sats > 92%. Wean off as tolerated.  - LLL consolidation, albuterol and mucomyst Nebs today x 2, assess response.   - Sputum culture showing Klebsiella- zosyn started 3/21, changed to bactrim with sensitivities, ID recommended Bactrim for 10 days.        ID:   - WBC 3.9, afebrile, no signs or symptoms of infection other than SOB. Completed 7 days cefepime/zosyn 3/16.   - Blood cultures 3/13: NGTD. C diff negative 3/17.    - Sputum culture positive 3/20, bactrim, ID consulted, continue bactrim for klebsiella pneumonia. Discuss duration with ID.       GI / FEN:  - Reg diet, bowel regimen. Calorie counts. Na 133  - Tube feeds were off 3/21 for nausea. New formula 3/22 pm. - removed NJ and stopped tube feeding 3/25, patient refusing TF since 3/20.   - Hx ulcerative colitis; pta mesalamine. Stool occult negative 3/20.  - Increased PPI to bid for GERD  - RUQ abdominal pain: RUQ US 3/26 showing mild gallbladder wall thickening with biliary sludge. No classic biliary symptoms, no fevers or leukocytosis. Biliary dyskinesia?     Renal / :   - Creatinine 2.47, improved UOP on Bumex.   - Nephrology following for oliguria and T/Th/Sa hemodialysis. Unable to get tunneled dialysis catheter due to lena drain to old right pacer pocket. RIJ dialysis catheter no longer functional and dialysis unable to be performed 3/21. Patient is showing signs of renal recovery but still not enough to go completely without dialysis. RIJ dialysis catheter replaced on 3/22  - Has not had dialysis since 3/23, electrolytes stable. Nephrology want to hold off on  dialysis again. Bumex challenge 3 IV 3/26 with 1.1L UOP.  Re dose Bumex 3/27. UA clean 3/26.       Heme / Anticoagulation:  - Got 1 unit PRBC for Hgb 6.9 on 3/24, hgb 7.6 today, stable. No signs of bleeding.   - Will need coumadin eventually for SVC graft per Dr House. Will need to discuss with Cardiology, may prefer Apixaban due to frequent biopsies.    - No biopsies or central line access on left due to SVC graft.   - Holding Hep gtt since 3/18. Has R chest hematoma shouldn't interfere with RIGHT chest HD tunneled catheter placement. Chest CT 3/19 confirms R chest hematoma at old AICD site, s/p drainage in OR 3/21.     Endo:   - Hypothyroid: levothyroxine 100 mcg daily   - Sliding corrective scale qid      PPX:   - Protonix     Dispo:   - 6C since 3/16  - RUQ US and LFT's today  - ARU after tunneled dialysis catheter placed or further renal recovery and no dialysis. Hold on tunneled catheter for now, patient showing renal recovery.       Discussed with CVTS Fellow   Staff surgeons have been informed of changes through both  verbal and written communication.      Arcadio Blackburn PA-C  Cardiothoracic Surgery  Pager [phone]    8:36 AM   March 27, 2019

## 2019-03-27 NOTE — PLAN OF CARE
"Discharge Planner PT   Patient plan for discharge: Rehab  Current status: Mildly agitated with prior PT sessions due to being \"yanked\" around. Assisted sit<>stand with min A (highly elevated bed), into bathroom and amb 40' with FWW/SBA.   Barriers to return to prior living situation: Mainly limited by proximal LE weakness  Recommendations for discharge: Likely a better TCU candidate at this point but would benefit from ARC as well.  Rationale for recommendations: Current level of function and rehab trajectory       Entered by: Ray Lemus 03/27/2019 4:25 PM       "

## 2019-03-28 LAB
ANION GAP SERPL CALCULATED.3IONS-SCNC: 15 MMOL/L (ref 3–14)
BACTERIA SPEC CULT: NO GROWTH
BUN SERPL-MCNC: 76 MG/DL (ref 7–30)
CALCIUM SERPL-MCNC: 8 MG/DL (ref 8.5–10.1)
CHLORIDE SERPL-SCNC: 100 MMOL/L (ref 94–109)
CO2 SERPL-SCNC: 18 MMOL/L (ref 20–32)
CREAT SERPL-MCNC: 2.55 MG/DL (ref 0.66–1.25)
DONOR IDENTIFICATION: NORMAL
DSA COMMENTS: NORMAL
DSA PRESENT: NO
DSA TEST METHOD: NORMAL
EBV DNA # SPEC NAA+PROBE: NORMAL {COPIES}/ML
EBV DNA SPEC NAA+PROBE-LOG#: NORMAL {LOG_COPIES}/ML
ERYTHROCYTE [DISTWIDTH] IN BLOOD BY AUTOMATED COUNT: 19.7 % (ref 10–15)
GFR SERPL CREATININE-BSD FRML MDRD: 27 ML/MIN/{1.73_M2}
GLUCOSE BLDC GLUCOMTR-MCNC: 137 MG/DL (ref 70–99)
GLUCOSE BLDC GLUCOMTR-MCNC: 140 MG/DL (ref 70–99)
GLUCOSE BLDC GLUCOMTR-MCNC: 185 MG/DL (ref 70–99)
GLUCOSE BLDC GLUCOMTR-MCNC: 209 MG/DL (ref 70–99)
GLUCOSE BLDC GLUCOMTR-MCNC: 82 MG/DL (ref 70–99)
GLUCOSE SERPL-MCNC: 142 MG/DL (ref 70–99)
HCT VFR BLD AUTO: 25.9 % (ref 40–53)
HGB BLD-MCNC: 8.1 G/DL (ref 13.3–17.7)
MAGNESIUM SERPL-MCNC: 2.2 MG/DL (ref 1.6–2.3)
MCH RBC QN AUTO: 30.6 PG (ref 26.5–33)
MCHC RBC AUTO-ENTMCNC: 31.3 G/DL (ref 31.5–36.5)
MCV RBC AUTO: 98 FL (ref 78–100)
ORGAN: NORMAL
PHOSPHATE SERPL-MCNC: 3.6 MG/DL (ref 2.5–4.5)
PLATELET # BLD AUTO: 255 10E9/L (ref 150–450)
POTASSIUM SERPL-SCNC: 3.3 MMOL/L (ref 3.4–5.3)
RBC # BLD AUTO: 2.65 10E12/L (ref 4.4–5.9)
SA1 CELL: NORMAL
SA1 COMMENTS: NORMAL
SA1 HI RISK ABY: NORMAL
SA1 MOD RISK ABY: NORMAL
SA1 TEST METHOD: NORMAL
SA2 CELL: NORMAL
SA2 COMMENTS: NORMAL
SA2 HI RISK ABY UA: NORMAL
SA2 MOD RISK ABY: NORMAL
SA2 TEST METHOD: NORMAL
SODIUM SERPL-SCNC: 134 MMOL/L (ref 133–144)
SPECIMEN SOURCE: NORMAL
UNACCEPTABLE ANTIGEN: NORMAL
UNOS CPRA: 0
WBC # BLD AUTO: 4.6 10E9/L (ref 4–11)

## 2019-03-28 PROCEDURE — 93505 ENDOMYOCARDIAL BIOPSY: CPT | Performed by: INTERNAL MEDICINE

## 2019-03-28 PROCEDURE — 25000132 ZZH RX MED GY IP 250 OP 250 PS 637: Performed by: STUDENT IN AN ORGANIZED HEALTH CARE EDUCATION/TRAINING PROGRAM

## 2019-03-28 PROCEDURE — 80048 BASIC METABOLIC PNL TOTAL CA: CPT | Performed by: THORACIC SURGERY (CARDIOTHORACIC VASCULAR SURGERY)

## 2019-03-28 PROCEDURE — 27210794 ZZH OR GENERAL SUPPLY STERILE: Performed by: INTERNAL MEDICINE

## 2019-03-28 PROCEDURE — 25000132 ZZH RX MED GY IP 250 OP 250 PS 637: Performed by: INTERNAL MEDICINE

## 2019-03-28 PROCEDURE — 85027 COMPLETE CBC AUTOMATED: CPT | Performed by: THORACIC SURGERY (CARDIOTHORACIC VASCULAR SURGERY)

## 2019-03-28 PROCEDURE — 84132 ASSAY OF SERUM POTASSIUM: CPT | Performed by: INTERNAL MEDICINE

## 2019-03-28 PROCEDURE — 25000128 H RX IP 250 OP 636: Performed by: STUDENT IN AN ORGANIZED HEALTH CARE EDUCATION/TRAINING PROGRAM

## 2019-03-28 PROCEDURE — 25000132 ZZH RX MED GY IP 250 OP 250 PS 637: Performed by: NURSE PRACTITIONER

## 2019-03-28 PROCEDURE — 25000131 ZZH RX MED GY IP 250 OP 636 PS 637: Performed by: NURSE PRACTITIONER

## 2019-03-28 PROCEDURE — 36592 COLLECT BLOOD FROM PICC: CPT | Performed by: THORACIC SURGERY (CARDIOTHORACIC VASCULAR SURGERY)

## 2019-03-28 PROCEDURE — 88346 IMFLUOR 1ST 1ANTB STAIN PX: CPT | Performed by: THORACIC SURGERY (CARDIOTHORACIC VASCULAR SURGERY)

## 2019-03-28 PROCEDURE — 25000132 ZZH RX MED GY IP 250 OP 250 PS 637: Performed by: PHYSICIAN ASSISTANT

## 2019-03-28 PROCEDURE — 25000125 ZZHC RX 250: Performed by: INTERNAL MEDICINE

## 2019-03-28 PROCEDURE — 02BM3ZX EXCISION OF VENTRICULAR SEPTUM, PERCUTANEOUS APPROACH, DIAGNOSTIC: ICD-10-PCS | Performed by: INTERNAL MEDICINE

## 2019-03-28 PROCEDURE — 83735 ASSAY OF MAGNESIUM: CPT | Performed by: THORACIC SURGERY (CARDIOTHORACIC VASCULAR SURGERY)

## 2019-03-28 PROCEDURE — 93505 ENDOMYOCARDIAL BIOPSY: CPT | Mod: 26 | Performed by: INTERNAL MEDICINE

## 2019-03-28 PROCEDURE — 99232 SBSQ HOSP IP/OBS MODERATE 35: CPT | Mod: 25 | Performed by: NURSE PRACTITIONER

## 2019-03-28 PROCEDURE — C1894 INTRO/SHEATH, NON-LASER: HCPCS | Performed by: INTERNAL MEDICINE

## 2019-03-28 PROCEDURE — 25000128 H RX IP 250 OP 636: Performed by: PHYSICIAN ASSISTANT

## 2019-03-28 PROCEDURE — 00000146 ZZHCL STATISTIC GLUCOSE BY METER IP

## 2019-03-28 PROCEDURE — 84100 ASSAY OF PHOSPHORUS: CPT | Performed by: THORACIC SURGERY (CARDIOTHORACIC VASCULAR SURGERY)

## 2019-03-28 PROCEDURE — 88350 IMFLUOR EA ADDL 1ANTB STN PX: CPT | Performed by: THORACIC SURGERY (CARDIOTHORACIC VASCULAR SURGERY)

## 2019-03-28 PROCEDURE — 21400000 ZZH R&B CCU UMMC

## 2019-03-28 PROCEDURE — 25000131 ZZH RX MED GY IP 250 OP 636 PS 637: Performed by: HOSPITALIST

## 2019-03-28 PROCEDURE — 88307 TISSUE EXAM BY PATHOLOGIST: CPT | Performed by: THORACIC SURGERY (CARDIOTHORACIC VASCULAR SURGERY)

## 2019-03-28 PROCEDURE — 36592 COLLECT BLOOD FROM PICC: CPT | Performed by: INTERNAL MEDICINE

## 2019-03-28 PROCEDURE — 25000128 H RX IP 250 OP 636: Performed by: INTERNAL MEDICINE

## 2019-03-28 RX ORDER — BUMETANIDE 0.25 MG/ML
3 INJECTION INTRAMUSCULAR; INTRAVENOUS ONCE
Status: COMPLETED | OUTPATIENT
Start: 2019-03-28 | End: 2019-03-28

## 2019-03-28 RX ORDER — POTASSIUM CHLORIDE 750 MG/1
20 TABLET, EXTENDED RELEASE ORAL ONCE
Status: COMPLETED | OUTPATIENT
Start: 2019-03-28 | End: 2019-03-28

## 2019-03-28 RX ORDER — POTASSIUM CHLORIDE 29.8 MG/ML
20 INJECTION INTRAVENOUS ONCE
Status: DISCONTINUED | OUTPATIENT
Start: 2019-03-28 | End: 2019-03-28

## 2019-03-28 RX ADMIN — Medication 10 MEQ: at 08:16

## 2019-03-28 RX ADMIN — Medication 500000 UNITS: at 20:00

## 2019-03-28 RX ADMIN — POTASSIUM CHLORIDE 20 MEQ: 750 TABLET, EXTENDED RELEASE ORAL at 14:03

## 2019-03-28 RX ADMIN — Medication 1 TABLET: at 08:16

## 2019-03-28 RX ADMIN — MYCOPHENOLATE MOFETIL 1500 MG: 500 TABLET, FILM COATED ORAL at 18:07

## 2019-03-28 RX ADMIN — ROSUVASTATIN CALCIUM 10 MG: 10 TABLET, FILM COATED ORAL at 08:08

## 2019-03-28 RX ADMIN — PREDNISONE 15 MG: 10 TABLET ORAL at 08:08

## 2019-03-28 RX ADMIN — Medication 1 CAPSULE: at 08:09

## 2019-03-28 RX ADMIN — SULFAMETHOXAZOLE AND TRIMETHOPRIM 1 TABLET: 800; 160 TABLET ORAL at 08:09

## 2019-03-28 RX ADMIN — SENNOSIDES AND DOCUSATE SODIUM 2 TABLET: 8.6; 5 TABLET ORAL at 08:08

## 2019-03-28 RX ADMIN — SILDENAFIL 20 MG: 20 TABLET ORAL at 14:03

## 2019-03-28 RX ADMIN — RANITIDINE 150 MG: 150 TABLET ORAL at 08:08

## 2019-03-28 RX ADMIN — Medication 10 MEQ: at 07:03

## 2019-03-28 RX ADMIN — MELATONIN TAB 3 MG 6 MG: 3 TAB at 19:55

## 2019-03-28 RX ADMIN — CALCIUM CARBONATE (ANTACID) CHEW TAB 500 MG 500 MG: 500 CHEW TAB at 15:58

## 2019-03-28 RX ADMIN — FLUTICASONE PROPIONATE 2 PUFF: 220 AEROSOL, METERED RESPIRATORY (INHALATION) at 08:09

## 2019-03-28 RX ADMIN — Medication 1 TABLET: at 18:07

## 2019-03-28 RX ADMIN — DULOXETINE 20 MG: 20 CAPSULE, DELAYED RELEASE ORAL at 08:08

## 2019-03-28 RX ADMIN — Medication 500000 UNITS: at 14:02

## 2019-03-28 RX ADMIN — MONTELUKAST SODIUM 10 MG: 10 TABLET, COATED ORAL at 19:54

## 2019-03-28 RX ADMIN — TACROLIMUS 2.5 MG: 1 CAPSULE ORAL at 18:07

## 2019-03-28 RX ADMIN — Medication 500000 UNITS: at 08:09

## 2019-03-28 RX ADMIN — MESALAMINE 2400 MG: 800 TABLET, DELAYED RELEASE ORAL at 19:54

## 2019-03-28 RX ADMIN — SILDENAFIL 20 MG: 20 TABLET ORAL at 08:21

## 2019-03-28 RX ADMIN — BUMETANIDE 3 MG: 0.25 INJECTION INTRAMUSCULAR; INTRAVENOUS at 14:03

## 2019-03-28 RX ADMIN — SODIUM CHLORIDE, PRESERVATIVE FREE 20 ML: 5 INJECTION INTRAVENOUS at 05:51

## 2019-03-28 RX ADMIN — MESALAMINE 2400 MG: 800 TABLET, DELAYED RELEASE ORAL at 08:07

## 2019-03-28 RX ADMIN — PANTOPRAZOLE SODIUM 40 MG: 40 TABLET, DELAYED RELEASE ORAL at 08:08

## 2019-03-28 RX ADMIN — SENNOSIDES AND DOCUSATE SODIUM 1 TABLET: 8.6; 5 TABLET ORAL at 19:54

## 2019-03-28 RX ADMIN — MYCOPHENOLATE MOFETIL 1500 MG: 500 TABLET, FILM COATED ORAL at 08:08

## 2019-03-28 RX ADMIN — SILDENAFIL 20 MG: 20 TABLET ORAL at 19:54

## 2019-03-28 RX ADMIN — TACROLIMUS 2.5 MG: 1 CAPSULE ORAL at 08:08

## 2019-03-28 RX ADMIN — LEVOTHYROXINE SODIUM 100 MCG: 100 TABLET ORAL at 08:09

## 2019-03-28 RX ADMIN — Medication 500000 UNITS: at 17:16

## 2019-03-28 RX ADMIN — PANTOPRAZOLE SODIUM 40 MG: 40 TABLET, DELAYED RELEASE ORAL at 19:54

## 2019-03-28 RX ADMIN — FLUTICASONE PROPIONATE 2 PUFF: 220 AEROSOL, METERED RESPIRATORY (INHALATION) at 19:55

## 2019-03-28 RX ADMIN — Medication 10 ML: at 19:55

## 2019-03-28 RX ADMIN — PREDNISONE 10 MG: 10 TABLET ORAL at 19:55

## 2019-03-28 ASSESSMENT — ACTIVITIES OF DAILY LIVING (ADL)
ADLS_ACUITY_SCORE: 12

## 2019-03-28 NOTE — PLAN OF CARE
D: Admitted 2/17 with HF. S/p OHT 2/24. Hx: NICM s/p ICD, ASD repair as child, pAfib s/p ablation, ulcerative colitis.      I: Monitored vitals and assessed pt status.   Changed: IV Bumex (holding off on dialysis). Biopsy results show possible AMR, cellcept dose increased 3/25. Fell evening of 3/27, bed alarm on overnight.  Running: N/A  PRN: Tylenol and Oxycodone every 4 hours for RLQ pain.      A: VSS, A&O, up with 1-2 and walker. MATA improving. oxygen stable on RA. Tele SR, HR 80-90's. Mepilex remains in place, continue to encourage frequent position changes. Reports abd discomfort throughout shift, denies nausea. Adequate urine output throughout shift. (+) for BM this morning.    Temp:  [97.9  F (36.6  C)-98.3  F (36.8  C)] 98.3  F (36.8  C)  Pulse:  [84-85] 85  Heart Rate:  [79-88] 83  Resp:  [16-18] 16  BP: (110-120)/(69-90) 114/74  Cuff Mean (mmHg):  [97] 97  SpO2:  [99 %-100 %] 100 %      P: Plan for RHC with Biopsy today. Will continue to monitor pt status and report changes to treatment team.

## 2019-03-28 NOTE — PROGRESS NOTES
Calorie Count  Intake recorded for: 3/27  Total Kcals: 315 Total Protein: 2g  Kcals from Hospital Food: 315  Protein: 2g  Kcals from Outside Food (average):0 Protein: 0g  # Meals Recorded: 50% 2 pancakes w/ butter & syrup  # Supplements Recorded: 0

## 2019-03-28 NOTE — PLAN OF CARE
D. Hx OHT 2/24. Pt is stable. ALOx4. C/o RLQ discomfort that is tolerable with PRN Tylenol. Pt requested Oxi and Tylenol at HS for chest incisional pain and RLQ discomfort. On RA. MATA. SR on tele. Appetite is good. On rahat count.  Coccyx with blanchable erythema and it is covered with Mepilex drsg. Up to the bathroom with walker and assist 1-2. Pt has been declining to use gait belt regardless of encouragement. Pt had a near fall incident in the bathroom after he was assisted to the bathroom (see provider notification note)  I. Keeping a strict I and O's.   A. Pt is stable.   P. Continue to monitor.

## 2019-03-28 NOTE — PROGRESS NOTES
Transplant Social Work Services Progress Note      Date of Initial Social Work Evaluation: 11/16/2018, 1/30/19  Collaborated with: Patient and his parents,at bedside    Data: Patient received heart transplant on 2/24/19 and has had complicated medical medical course. Holding off on dialysis with anticipation that pt may not need this moving forward. Pt had RHC today.   Pt sustained fall last evening, but did not sustain injuries.   Pt's parents able to attend Heart Transplant support group today.   Intervention: Emotional Support  Assessment: SW met w/ pt and his parents. Pt particularly angry and frustrated today regarding care and fall last evening. Pt acknowledged that this week has been emotionally difficult. Pt verbalized that did not want to answer writer's questions today because he was so frustrated. He was receptive to writer coming back tomorrow   Parents feeling stressed because pt is feeling stressed. Normalized their feelings of frustration around pt's post transplant medical course. They and pt are optimistic that he will not need to continue with dialysis and that we are getting closer to transitioning to Acute Rehab.   Education provided by SW: Ongoing SW involvement for emotional support. Discussed benefits of attending support group for additional support.   Plan:    Discharge Plans in Progress: [facility] ARU following.     Barriers to d/c plan: Medical Stability.     Follow up Plan: SW will return tomorrow to provide additional support to pt.

## 2019-03-28 NOTE — PROGRESS NOTES
CLINICAL NUTRITION SERVICES     Nutrition Prescription    RECOMMENDATIONS FOR MDs/PROVIDERS TO ORDER:  Monitor potential need for feeding tube replacement if oral intake does not improve. Reorder kcal counts if oral intake is not improving.     Future/Additional Recommendations:  For all recommendations, see prior nutrition notes.   Pt may need encouragement at meals to eat.      Diet: NPO currently. Previously regular diet order. Ordered to receive oral supplements.   Kcal counts:   3/25   720 kcals and 33 g protein (100% banana, pineapple, 75% cupcake and 100% 1 Gelatein Plus, 1 Boost Breeze  supplement/s recorded)   3/26   647 kcals and 43 g protein (100% grapes, 75% omelet w/ sausage & cheese and 100% 1 Gelatein Plus  supplement/s recorded)   3/27   315 kcals and 2 g protein (50% of two pancakes with butter and syrup and no supplement/s recorded) - Supper was recorded. Pt did not order lunch. For breakfast, he only ordered fruit which totaled 360 kcals and 5 g protein.   * Pt consumed a three-day average of 561 kcals and 26 g protein daily. This does not meet estimated needs of 3602-6906+ kcals/day (30 - 35+ kcals/kg) and  grams protein/day (1.5 - 2 grams of pro/kg). Per RN, pt picks at his food and is not eating adequately. Pt has been NPO, at times, due to tests/procedures. Attempts x2 to see pt today but pt was at procedures.     INTERVENTIONS:  Implementation:  Collaboration with other providers: Discussed pt with RN. Discussed kcal count results with team.     Follow up/Monitoring:  Will continue to follow pt.    Jannie Guerrero, MS, RD, LD, [facility]   6C Pgr: [phone]

## 2019-03-28 NOTE — PROGRESS NOTES
CVTS Daily Note  3/28/2019  Attending: Dewayne House, *    S:   Overnight events- fell backwards onto toilet while in bathroom last night. No residual pain.  Says he fell because he misjudged the distance to toilet.   Planning RHC and Bx today to recheck for antibody rejection. He is NPO, will hold off on Bumex today.      Pt seen at bedside resting comfortably.    Does complain of weak abd muscles and sleep interruptions, otherwise no acute complaints.      Denies F/C/Sweats.  No CP, SOB, or calf pain.    Tolerating diet.  + BM (this AM).      Ambulated well without assistance.    Pain level tolerable. Plan as per Neuro section below.     O:   Vitals:    03/27/19 1925 03/28/19 0005 03/28/19 0400 03/28/19 0715   BP: 120/69 114/74  112/77   BP Location:  Left arm  Left arm   Pulse: 85      Resp: 18 16 16 16   Temp: 98.1  F (36.7  C) 98.3  F (36.8  C)  98.3  F (36.8  C)   TempSrc: Oral Oral  Oral   SpO2: 100% 100%  95%   Weight:       Height:         Vitals:    03/23/19 1800 03/25/19 0300 03/27/19 0551   Weight: 55.7 kg (122 lb 12.8 oz) 58 kg (127 lb 14.4 oz) 57.1 kg (125 lb 12.8 oz)       Intake/Output Summary (Last 24 hours) at 3/28/2019 0743  Last data filed at 3/28/2019 0600  Gross per 24 hour   Intake 880 ml   Output 975 ml   Net -95 ml       MAPs: 89 - 91   Gen: AAO x 3, pleasant, NAD  CV: RRR, S1S2 normal, no murmurs, rubs, or gallops.   Pulm: CTA, no rhonchi or wheezes  Abd: soft, non-tender, no guarding  Ext: trace peripheral edema  Incision: clean, dry, intact, no erythema  Chest Tube sites: clean and dry       Labs:  BMP  Recent Labs   Lab 03/28/19  0558 03/27/19  0521 03/26/19  1634 03/26/19  0531 03/25/19  0612    133  --  131* 132*   POTASSIUM 3.3* 3.5 3.5 3.1* 3.4   CHLORIDE 100 100  --  98 97   RADAMES 8.0* 7.9*  --  7.8* 8.0*   CO2 18* 20  --  18* 18*   BUN 76* 76*  --  74* 78*   CR 2.55* 2.47*  --  2.61* 2.64*   * 130*  --  133* 115*     CBC  Recent Labs   Lab 03/28/19  0558  03/27/19  0521 03/26/19  0531 03/25/19  0612   WBC 4.6 3.9* 4.2 5.0   RBC 2.65* 2.52* 2.51* 2.50*   HGB 8.1* 7.6* 7.5* 7.6*   HCT 25.9* 24.4* 24.3* 24.2*   MCV 98 97 97 97   MCH 30.6 30.2 29.9 30.4   MCHC 31.3* 31.1* 30.9* 31.4*   RDW 19.7* 19.4* 19.0* 19.3*    187 161 135*     INR  Recent Labs   Lab 03/27/19  0521 03/26/19  0531 03/25/19  0612 03/24/19  0552   INR 1.39* 1.38* 1.41* 1.40*      Hepatic Panel   Lab Results   Component Value Date    AST 16 03/26/2019     Lab Results   Component Value Date    ALT 23 03/26/2019     Lab Results   Component Value Date    ALBUMIN 2.4 03/26/2019     GLUCOSE:   Recent Labs   Lab 03/28/19  0558 03/28/19  0134 03/27/19  2149 03/27/19  1707 03/27/19  1135 03/27/19  0721 03/27/19  0521 03/27/19  0210  03/26/19  0531  03/25/19  0612  03/24/19  0552  03/23/19  0609   *  --   --   --   --   --  130*  --   --  133*  --  115*  --  98  --  100*   BGM  --  185* 233* 125* 108* 129*  --  153*   < >  --    < >  --    < >  --    < >  --     < > = values in this interval not displayed.       Imaging:  reviewed recent imaging     A/P:  Everton Larios is a 53 year old male with PMH etoh abuse, hypothyroidism, intermittent asthma, ulcerative colitis, Depression, Chronic HFrEF, NICM admitted with cardiogenic shock requiring subclavian balloon pump. He is now s/p OHT 2/24/19 with Piyush House and Christopher.    - RHC and biopsy 3/4; 0R, no AMR  - RHC and biopsy 3/11; 0R, no AMR  - RHC and biopsy 3/18; 1R, no AMR   - RHC and biopsy 3/25;  0R, biopsy suggestive of AMR.   - RHC and biopsy 3/28; pending      Neuro:   - Neuro intact. ICU delirium improving. Head CT 3/9 without acute pathology.   - Hx Depression: pta cymbalta  - Sleep: melatonin   - Tylenol and oxycodone, also has tramadol      CV:   - Hx of NICM, now S/P heart transplant 2/24. No arrhythmia, HD stable.  - ASA 81 mg, Crestor. Sildenafil 20 mg TID.   - Immunosuppression per cardiology   - RHC 3/25 Mild elevated filling  pressures. Clinically stable off dobutamine.   - Removed lena drain to right pacer pocket 3/26.      Pulm:   - Hx intermittent asthma, pta Singulair.   - Pulm toilet, IS, activity and deep breathing   - Supplemental O2 PRN to keep sats > 92%. Wean off as tolerated.  - LLL consolidation, albuterol and mucomyst Nebs today x 2, assess response.   - Sputum culture showing Klebsiella- zosyn started 3/21, changed to bactrim with sensitivities, ID recommended Bactrim for 10 days.        ID:   - WBC 4.6, afebrile, no signs or symptoms of infection other than SOB. Completed 7 days cefepime/zosyn 3/16.   - Blood cultures 3/13: NGTD. C diff negative 3/17.    - Sputum culture positive 3/20, bactrim, ID consulted, continue bactrim for klebsiella pneumonia. Discuss duration with ID.       GI / FEN:  - Reg diet, bowel regimen. Calorie counts. Na 134  - Tube feeds were off 3/21 for nausea. New formula 3/22 pm. - removed NJ and stopped tube feeding 3/25, patient refusing TF since 3/20.   - Hx ulcerative colitis; pta mesalamine. Stool occult negative 3/20.  - Increased PPI to bid for GERD   - RUQ abdominal pain: RUQ US 3/26 showing mild gallbladder wall thickening with biliary sludge. No classic biliary symptoms, no fevers or leukocytosis. Biliary dyskinesia?     Renal / :   - Creatinine 2.55, improved UOP on Bumex.   - Nephrology following for oliguria and T/Th/Sa hemodialysis. Unable to get tunneled dialysis catheter due to lena drain to old right pacer pocket. Patient is showing signs of renal recovery and is trending towards not needing dialysis.  - Has not had dialysis since 3/23, electrolytes stable. Nephrology now considering removal of R IJ HD line. Bumex challenge 3 IV 3/26 with 1.1L UOP.  Re-dose Bumex 3/27 and 1 L UOP. UA clean 3/26.  Holding bumex for NPO status 3/28 am.       Heme / Anticoagulation:  - Got 1 unit PRBC for Hgb 6.9 on 3/24, hgb 8.1, stable. No signs of bleeding.   - Will need coumadin eventually for  SVC graft per Dr House. Will need to discuss with Cardiology, may prefer Apixaban due to frequent biopsies.    - No biopsies or central line access on left due to SVC graft.   - Holding Hep gtt since 3/18. Has R chest hematoma shouldn't interfere with RIGHT chest HD tunneled catheter placement. Chest CT 3/19 confirms R chest hematoma at old AICD site, s/p drainage in OR 3/21.      Endo:   - Hypothyroid: levothyroxine 100 mcg daily   - Sliding corrective scale qid      PPX:   - Protonix     Dispo:   - 6C since 3/16  - RUQ US and LFT's today  - ARU after renal recovery. Hold on tunneled catheter for now, patient showing renal recovery.       Discussed with CVTS Fellow   Staff surgeons have been informed of changes through both  verbal and written communication.      Arcadio Blackburn PA-C  Cardiothoracic Surgery  Pager [phone]    7:43 AM   March 28, 2019

## 2019-03-28 NOTE — PROVIDER NOTIFICATION
Time: 1820    Reason: While waiting pt to call after using the toilet, this RN heard a thud. Found pt still sitting on toilet,  leaning forward and  L arm on the floor supporting himself. Pt was transferred to the bathroom ~5 min prior to this incident with this RN and NST.   Pt ALOx4. Neuro unchanged. No visible injury was noted. VSS.     Contacted surgery on call.  MD came and saw pt. Pt told MD that while urinating in the bathroom he fell backward onto the toilet. He stated that he was briefly slumped over forward but denies hitting his head.     MD ordered to continue to monitor.

## 2019-03-28 NOTE — SUMMARY OF CARE
-Pt had a right sided heart cath and biopsy this morning and successfully completed 2 hours of bed rest and frequent vital signs per protocol.  -Pt's insertion site is clean, dry and intact and free of bleeding and swelling.  -The insertion site is bruised and near yesterday's procedural insertion site which is also bruised.  -Pt transferred to another room today due to station logistics.

## 2019-03-28 NOTE — PROGRESS NOTES
[facility]   Cardiology II Service / Advanced Heart Failure  Daily Consult Note      Patient: Everton Larios  MRN: [id number]  Admission Date: 2/17/2019  Hospital Day # 39    Assessment and Plan: Everton Larios is a 54 year old male with history of hypothyroidism, asthma, UC, depression, etoh abuse, s/p orthotopic heart transplant 2/24/19 for non ischemic cardiomyopathy and cardiogenic shock requiring IABP. Post-op complicated by RV dysfunction and FRANKLIN requiring HD.     Recommendations:  -f/u same-day read bx today  -monitor DSA results  -IV Bumex 3 mg today  -monitor urine culture    # Status post OHT on 2/24/19, history of NICM  # Chronic immunosuppression  * TTE 3/5/19: EF 60-65%, mild-mod RV dilation, global RV function mildly reduced, moderate TR, IVC normal without collapse   * TTE 3/26/19: EF 60-65%, moderate LVH, RV mod dilated and mod reduced function, IVC dilated  * RHC 3/28/19: RA 9, PA 35/19 (25), PCWP 18, Jaden CO/CI 5.4/3.2    Volume Status/Graft Function:   --Volume: RA 9, PCWP 18 today, Bumex IV 3 mg   --BP: at goal not on Rx   --HR: 80s  --RV support: sildenafil 20 mg tid, continue for now ?wean tomorrow    Immunosuppression:   --did not receive Simulect  --continue prednisone per taper with negative bx, currently 15/10 mg (havent decreased this week due to AMR concern)  --continue Cellcept 1500 mg BID   --tacrolimus 2.5 mg bid, goal 8-10, 7.7 3/27, recheck tomorrow    Prophylaxis:   --CAV: holding aspirin 81 mg given bleeding post-op, rosuvastatin 10 mg daily  --Thrush: Nystatin swish and swallow  --PCP: pentamidine given 3/3, he is on Bactrim as below, change to DS twice weekly ppx when 10d course done  --GI: Protonix   --CMV: D+/R+, valcyte renally dosed through May, weekly CMV PCR for now per ID through mid April then per protocol, due 4/1  --Osteoporosis: calcium/vitamin D      Serostatus: CMV: D+/R+. EBV: D+/R+    Biopsies:   --#1 3/4: NER  --#2 3/11: NER  --#3 3/18:  "NER  --#4 3/25: 0R, +pAMR 1 +staining for c4d, graft function normal by echo but septum thick  --repeat bx 3/28 pending  --#5 due 4/8  *No biopsies or central line access on left due to SVC graft    #FRANKLIN on iHD, oliguric, improving  -renal managing  -agree with IV Bumex     #Klebsiella in sputum  Opacity by CXR 3/22 and GGO in right ling 3/19. S/p cefepime/zosyn x 7 days (-3/16), improved clinically. Now on 10d course of oral Bactrim per ID.    -monitor resp symptoms     ================================================================    Subjective/24-Hr Events:   Last 24 hr care team notes reviewed. Fell in the bathroom last night - no syncope per patient, sat down thinking he was on the toilet and underestimated the distance. He did not hit his head. No specific complaints today. Denies exertional chest pain, shortness of breath.     ROS:  4 point ROS including respiratory, CV, GI and  (other than that noted in the HPI) is negative.     Medications: Reviewed in EPIC.     Physical Exam:   /76 (BP Location: Left arm)   Pulse 82   Temp 97.4  F (36.3  C) (Oral)   Resp 16   Ht 1.727 m (5' 8\")   Wt 57.1 kg (125 lb 12.8 oz)   SpO2 98%   BMI 19.13 kg/m      GENERAL: Appears comfortable, in no distress.  HEENT: Eye symmetrical, no discharge or icterus bilaterally. Mucous membranes moist and without lesions.  NECK: Supple, JVD 2cm above clavicle at 90 degrees.   CV: RRR, +S1S2, no murmur, rub, or gallop.   RESPIRATORY: Respirations regular, even, and unlabored. Lungs CTA throughout.   GI: Soft and non distended with normoactive bowel sounds present in all quadrants. No tenderness, rebound, guarding.   EXTREMITIES: Trace ankle peripheral edema. 2+ bilateral pedal pulses.   NEUROLOGIC: Alert and oriented x 3. No focal deficits.   MUSCULOSKELETAL: No joint swelling or tenderness.   SKIN: No jaundice. No rashes or lesions. Sternum stable.     Labs:  CMP  Recent Labs   Lab 03/28/19  0558 03/27/19  0521 " 03/26/19  1634 03/26/19  0531 03/25/19  0612    133  --  131* 132*   POTASSIUM 3.3* 3.5 3.5 3.1* 3.4   CHLORIDE 100 100  --  98 97   CO2 18* 20  --  18* 18*   ANIONGAP 15* 13  --  15* 16*   * 130*  --  133* 115*   BUN 76* 76*  --  74* 78*   CR 2.55* 2.47*  --  2.61* 2.64*   GFRESTIMATED 27* 28*  --  26* 26*   GFRESTBLACK 31* 33*  --  30* 30*   RADAMES 8.0* 7.9*  --  7.8* 8.0*   MAG 2.2 2.2  --  2.3 2.3   PHOS 3.6 3.6  --  4.4 4.7*   PROTTOTAL  --   --   --  5.0*  --    ALBUMIN  --   --   --  2.4*  --    BILITOTAL  --   --   --  0.5  --    ALKPHOS  --   --   --  110  --    AST  --   --   --  16  --    ALT  --   --   --  23  --        CBC  Recent Labs   Lab 03/28/19  0558 03/27/19  0521 03/26/19  0531 03/25/19  0612   WBC 4.6 3.9* 4.2 5.0   RBC 2.65* 2.52* 2.51* 2.50*   HGB 8.1* 7.6* 7.5* 7.6*   HCT 25.9* 24.4* 24.3* 24.2*   MCV 98 97 97 97   MCH 30.6 30.2 29.9 30.4   MCHC 31.3* 31.1* 30.9* 31.4*   RDW 19.7* 19.4* 19.0* 19.3*    187 161 135*       INR  Recent Labs   Lab 03/27/19  0521 03/26/19  0531 03/25/19  0612 03/24/19  0552   INR 1.39* 1.38* 1.41* 1.40*       Time/Communication  I personally spent a total of 25 minutes. Of that 15 minutes was counseling/coordination of patient's care. Plan of care discussed with patient. See my note above for details.    Patient discussed with Dr. Beck.      Candida Srivastava DNP, NP-C  Advanced Heart Failure/Cardiology II Service  Pager [phone]

## 2019-03-29 ENCOUNTER — APPOINTMENT (OUTPATIENT)
Dept: OCCUPATIONAL THERAPY | Facility: CLINIC | Age: 54
DRG: 001 | End: 2019-03-29
Attending: INTERNAL MEDICINE
Payer: COMMERCIAL

## 2019-03-29 ENCOUNTER — APPOINTMENT (OUTPATIENT)
Dept: PHYSICAL THERAPY | Facility: CLINIC | Age: 54
DRG: 001 | End: 2019-03-29
Attending: INTERNAL MEDICINE
Payer: COMMERCIAL

## 2019-03-29 LAB
ANION GAP SERPL CALCULATED.3IONS-SCNC: 15 MMOL/L (ref 3–14)
BUN SERPL-MCNC: 68 MG/DL (ref 7–30)
CALCIUM SERPL-MCNC: 7.9 MG/DL (ref 8.5–10.1)
CHLORIDE SERPL-SCNC: 102 MMOL/L (ref 94–109)
CO2 SERPL-SCNC: 18 MMOL/L (ref 20–32)
COPATH REPORT: NORMAL
CREAT SERPL-MCNC: 2.5 MG/DL (ref 0.66–1.25)
ERYTHROCYTE [DISTWIDTH] IN BLOOD BY AUTOMATED COUNT: 19.6 % (ref 10–15)
GFR SERPL CREATININE-BSD FRML MDRD: 28 ML/MIN/{1.73_M2}
GLUCOSE BLDC GLUCOMTR-MCNC: 99 MG/DL (ref 70–99)
GLUCOSE SERPL-MCNC: 94 MG/DL (ref 70–99)
HCT VFR BLD AUTO: 26.1 % (ref 40–53)
HGB BLD-MCNC: 8.2 G/DL (ref 13.3–17.7)
MAGNESIUM SERPL-MCNC: 1.9 MG/DL (ref 1.6–2.3)
MCH RBC QN AUTO: 30.8 PG (ref 26.5–33)
MCHC RBC AUTO-ENTMCNC: 31.4 G/DL (ref 31.5–36.5)
MCV RBC AUTO: 98 FL (ref 78–100)
PHOSPHATE SERPL-MCNC: 3.2 MG/DL (ref 2.5–4.5)
PLATELET # BLD AUTO: 264 10E9/L (ref 150–450)
POTASSIUM SERPL-SCNC: 3.4 MMOL/L (ref 3.4–5.3)
POTASSIUM SERPL-SCNC: 3.6 MMOL/L (ref 3.4–5.3)
RBC # BLD AUTO: 2.66 10E12/L (ref 4.4–5.9)
SODIUM SERPL-SCNC: 134 MMOL/L (ref 133–144)
TACROLIMUS BLD-MCNC: 9.5 UG/L (ref 5–15)
TME LAST DOSE: NORMAL H
WBC # BLD AUTO: 5.4 10E9/L (ref 4–11)

## 2019-03-29 PROCEDURE — 25000132 ZZH RX MED GY IP 250 OP 250 PS 637: Performed by: INTERNAL MEDICINE

## 2019-03-29 PROCEDURE — 84100 ASSAY OF PHOSPHORUS: CPT | Performed by: THORACIC SURGERY (CARDIOTHORACIC VASCULAR SURGERY)

## 2019-03-29 PROCEDURE — 25000132 ZZH RX MED GY IP 250 OP 250 PS 637: Performed by: STUDENT IN AN ORGANIZED HEALTH CARE EDUCATION/TRAINING PROGRAM

## 2019-03-29 PROCEDURE — 25000132 ZZH RX MED GY IP 250 OP 250 PS 637: Performed by: HOSPITALIST

## 2019-03-29 PROCEDURE — 97116 GAIT TRAINING THERAPY: CPT | Mod: GP | Performed by: PHYSICAL THERAPIST

## 2019-03-29 PROCEDURE — 83735 ASSAY OF MAGNESIUM: CPT | Performed by: THORACIC SURGERY (CARDIOTHORACIC VASCULAR SURGERY)

## 2019-03-29 PROCEDURE — 25000128 H RX IP 250 OP 636: Performed by: PHYSICIAN ASSISTANT

## 2019-03-29 PROCEDURE — 00000146 ZZHCL STATISTIC GLUCOSE BY METER IP

## 2019-03-29 PROCEDURE — 25000132 ZZH RX MED GY IP 250 OP 250 PS 637: Performed by: NURSE PRACTITIONER

## 2019-03-29 PROCEDURE — 97110 THERAPEUTIC EXERCISES: CPT | Mod: GO | Performed by: OCCUPATIONAL THERAPIST

## 2019-03-29 PROCEDURE — 25000125 ZZHC RX 250: Performed by: INTERNAL MEDICINE

## 2019-03-29 PROCEDURE — 25000132 ZZH RX MED GY IP 250 OP 250 PS 637: Performed by: PHYSICIAN ASSISTANT

## 2019-03-29 PROCEDURE — 40000802 ZZH SITE CHECK

## 2019-03-29 PROCEDURE — 80048 BASIC METABOLIC PNL TOTAL CA: CPT | Performed by: THORACIC SURGERY (CARDIOTHORACIC VASCULAR SURGERY)

## 2019-03-29 PROCEDURE — 21400000 ZZH R&B CCU UMMC

## 2019-03-29 PROCEDURE — 80197 ASSAY OF TACROLIMUS: CPT | Performed by: THORACIC SURGERY (CARDIOTHORACIC VASCULAR SURGERY)

## 2019-03-29 PROCEDURE — 85027 COMPLETE CBC AUTOMATED: CPT | Performed by: THORACIC SURGERY (CARDIOTHORACIC VASCULAR SURGERY)

## 2019-03-29 PROCEDURE — 25000131 ZZH RX MED GY IP 250 OP 636 PS 637: Performed by: HOSPITALIST

## 2019-03-29 PROCEDURE — 25000131 ZZH RX MED GY IP 250 OP 636 PS 637: Performed by: NURSE PRACTITIONER

## 2019-03-29 PROCEDURE — 99232 SBSQ HOSP IP/OBS MODERATE 35: CPT | Performed by: NURSE PRACTITIONER

## 2019-03-29 PROCEDURE — 97530 THERAPEUTIC ACTIVITIES: CPT | Mod: GP | Performed by: PHYSICAL THERAPIST

## 2019-03-29 PROCEDURE — 36592 COLLECT BLOOD FROM PICC: CPT | Performed by: THORACIC SURGERY (CARDIOTHORACIC VASCULAR SURGERY)

## 2019-03-29 RX ORDER — PREDNISONE 10 MG/1
10 TABLET ORAL DAILY
Status: DISCONTINUED | OUTPATIENT
Start: 2019-03-30 | End: 2019-04-03 | Stop reason: HOSPADM

## 2019-03-29 RX ORDER — BUMETANIDE 1 MG/1
1 TABLET ORAL DAILY
Status: DISCONTINUED | OUTPATIENT
Start: 2019-03-29 | End: 2019-03-30

## 2019-03-29 RX ADMIN — MONTELUKAST SODIUM 10 MG: 10 TABLET, COATED ORAL at 21:14

## 2019-03-29 RX ADMIN — SILDENAFIL 20 MG: 20 TABLET ORAL at 14:31

## 2019-03-29 RX ADMIN — MYCOPHENOLATE MOFETIL 1500 MG: 500 TABLET, FILM COATED ORAL at 17:57

## 2019-03-29 RX ADMIN — PANTOPRAZOLE SODIUM 40 MG: 40 TABLET, DELAYED RELEASE ORAL at 08:46

## 2019-03-29 RX ADMIN — PANTOPRAZOLE SODIUM 40 MG: 40 TABLET, DELAYED RELEASE ORAL at 20:01

## 2019-03-29 RX ADMIN — Medication 1 CAPSULE: at 08:45

## 2019-03-29 RX ADMIN — FLUTICASONE PROPIONATE 2 PUFF: 220 AEROSOL, METERED RESPIRATORY (INHALATION) at 20:02

## 2019-03-29 RX ADMIN — LEVOTHYROXINE SODIUM 100 MCG: 100 TABLET ORAL at 08:46

## 2019-03-29 RX ADMIN — ROSUVASTATIN CALCIUM 10 MG: 10 TABLET, FILM COATED ORAL at 08:45

## 2019-03-29 RX ADMIN — DULOXETINE 20 MG: 20 CAPSULE, DELAYED RELEASE ORAL at 08:46

## 2019-03-29 RX ADMIN — Medication 500000 UNITS: at 08:47

## 2019-03-29 RX ADMIN — MESALAMINE 2400 MG: 800 TABLET, DELAYED RELEASE ORAL at 08:45

## 2019-03-29 RX ADMIN — PREDNISONE 15 MG: 10 TABLET ORAL at 08:45

## 2019-03-29 RX ADMIN — Medication 5 ML: at 20:00

## 2019-03-29 RX ADMIN — Medication 1 TABLET: at 08:45

## 2019-03-29 RX ADMIN — SULFAMETHOXAZOLE AND TRIMETHOPRIM 1 TABLET: 800; 160 TABLET ORAL at 08:45

## 2019-03-29 RX ADMIN — TACROLIMUS 2.5 MG: 1 CAPSULE ORAL at 08:44

## 2019-03-29 RX ADMIN — Medication 1 TABLET: at 17:57

## 2019-03-29 RX ADMIN — VALGANCICLOVIR 450 MG: 450 TABLET, FILM COATED ORAL at 21:14

## 2019-03-29 RX ADMIN — MELATONIN TAB 3 MG 6 MG: 3 TAB at 21:13

## 2019-03-29 RX ADMIN — TACROLIMUS 2.5 MG: 1 CAPSULE ORAL at 17:57

## 2019-03-29 RX ADMIN — SENNOSIDES AND DOCUSATE SODIUM 1 TABLET: 8.6; 5 TABLET ORAL at 20:03

## 2019-03-29 RX ADMIN — Medication 500000 UNITS: at 11:52

## 2019-03-29 RX ADMIN — BUMETANIDE 1 MG: 1 TABLET ORAL at 17:57

## 2019-03-29 RX ADMIN — Medication 5 ML: at 00:00

## 2019-03-29 RX ADMIN — Medication 500000 UNITS: at 21:14

## 2019-03-29 RX ADMIN — PREDNISONE 10 MG: 10 TABLET ORAL at 20:01

## 2019-03-29 RX ADMIN — FLUTICASONE PROPIONATE 2 PUFF: 220 AEROSOL, METERED RESPIRATORY (INHALATION) at 08:46

## 2019-03-29 RX ADMIN — Medication 500000 UNITS: at 17:56

## 2019-03-29 RX ADMIN — SILDENAFIL 20 MG: 20 TABLET ORAL at 08:45

## 2019-03-29 RX ADMIN — MYCOPHENOLATE MOFETIL 1500 MG: 500 TABLET, FILM COATED ORAL at 08:45

## 2019-03-29 RX ADMIN — MESALAMINE 2400 MG: 800 TABLET, DELAYED RELEASE ORAL at 20:01

## 2019-03-29 ASSESSMENT — MIFFLIN-ST. JEOR: SCORE: 1376.94

## 2019-03-29 ASSESSMENT — ACTIVITIES OF DAILY LIVING (ADL)
ADLS_ACUITY_SCORE: 10
ADLS_ACUITY_SCORE: 12
ADLS_ACUITY_SCORE: 12
ADLS_ACUITY_SCORE: 10
ADLS_ACUITY_SCORE: 12
ADLS_ACUITY_SCORE: 10

## 2019-03-29 ASSESSMENT — PAIN DESCRIPTION - DESCRIPTORS: DESCRIPTORS: DISCOMFORT

## 2019-03-29 NOTE — PROGRESS NOTES
Calorie Count    Intake recorded for: 3/28/2019  Total Kcals: 365 Total Protein: 3g    Kcals from Hospital Food: 365   Protein: 3g    Kcals from Outside Food (average):0  Protein: 0g    # Meals Recorded: 1 meal (100% apple pie w/ whipped topping, mandarin oranges)    # Supplements Recorded: no intake recorded

## 2019-03-29 NOTE — PROGRESS NOTES
CVTS Daily Note  3/29/2019  Attending: Dewayne House, *    S:   No overnight events.   Pt seen at bedside resting comfortably.    No complaints today.    Denies F/C/Sweats.  No CP, SOB, or calf pain.    Appetite improving. + BM.  + Flatus.    Ambulating well with assistance in goss.    Pain level tolerable. Plan as per Neuro section below.       O:   Vitals:    03/28/19 2339 03/29/19 0049 03/29/19 0400 03/29/19 0706   BP: 115/74   110/73   BP Location: Left arm   Left arm   Pulse:       Resp: 16  16 16   Temp: 98.1  F (36.7  C)   98.1  F (36.7  C)   TempSrc: Oral   Oral   SpO2: 97%   98%   Weight:  57.2 kg (126 lb 3.2 oz)     Height:         Vitals:    03/25/19 0300 03/27/19 0551 03/29/19 0049   Weight: 58 kg (127 lb 14.4 oz) 57.1 kg (125 lb 12.8 oz) 57.2 kg (126 lb 3.2 oz)       Intake/Output Summary (Last 24 hours) at 3/27/2019 0836  Last data filed at 3/27/2019 0722  Gross per 24 hour   Intake 1080 ml   Output 1078.5 ml   Net 1.5 ml       MAPs:    Gen: AAO x 3, pleasant, NAD  Skin: multiple sites with bruising   CV: RRR, S1S2 normal, no murmurs, rubs, or gallops.   Pulm: CTA, no rhonchi or wheezes  Abd: soft, non-tender, no guarding  Ext: no peripheral edema  Incision: clean, dry, intact, no erythema  Chest Tube sites: dressings clean and dry      Labs:  BMP  Recent Labs   Lab 03/29/19  0430 03/28/19  2353 03/28/19  0558 03/27/19  0521  03/26/19  0531     --  134 133  --  131*   POTASSIUM 3.6 3.4 3.3* 3.5   < > 3.1*   CHLORIDE 102  --  100 100  --  98   RADAMES 7.9*  --  8.0* 7.9*  --  7.8*   CO2 18*  --  18* 20  --  18*   BUN 68*  --  76* 76*  --  74*   CR 2.50*  --  2.55* 2.47*  --  2.61*   GLC 94  --  142* 130*  --  133*    < > = values in this interval not displayed.     CBC  Recent Labs   Lab 03/29/19  0430 03/28/19  0558 03/27/19  0521 03/26/19  0531   WBC 5.4 4.6 3.9* 4.2   RBC 2.66* 2.65* 2.52* 2.51*   HGB 8.2* 8.1* 7.6* 7.5*   HCT 26.1* 25.9* 24.4* 24.3*   MCV 98 98 97 97   MCH 30.8 30.6  30.2 29.9   MCHC 31.4* 31.3* 31.1* 30.9*   RDW 19.6* 19.7* 19.4* 19.0*    255 187 161     INR  Recent Labs   Lab 03/27/19  0521 03/26/19  0531 03/25/19  0612 03/24/19  0552   INR 1.39* 1.38* 1.41* 1.40*      Hepatic Panel   Lab Results   Component Value Date    AST 16 03/26/2019     Lab Results   Component Value Date    ALT 23 03/26/2019     Lab Results   Component Value Date    ALBUMIN 2.4 03/26/2019     GLUCOSE:   Recent Labs   Lab 03/29/19  0430 03/29/19  0211 03/28/19  2211 03/28/19  1732 03/28/19  1208 03/28/19  0752 03/28/19  0558 03/28/19  0134  03/27/19  0521  03/26/19  0531  03/25/19  0612  03/24/19  0552   GLC 94  --   --   --   --   --  142*  --   --  130*  --  133*  --  115*  --  98   BGM  --  99 140* 209* 82 137*  --  185*   < >  --    < >  --    < >  --    < >  --     < > = values in this interval not displayed.       Imaging:  reviewed recent imaging, some biliary sludge and mild GB wall thickening  Echo TTE 3/26  Interpretation Summary  Moderate concentric wall thickening consistent with left ventricular  hypertrophy is present.  Global and regional left ventricular function is normal with an EF of 60-65%.  Right ventricle is moderately dilated with hypertrophy and systolic  dysfunction that is slightly worse than on 3/5/19.  IVC is dilated and RA pressure estimated 15 mmHg    A/P:  Everton Larios is a 53 year old male with PMH etoh abuse, hypothyroidism, intermittent asthma, ulcerative colitis, Depression, Chronic HFrEF, NICM admitted with cardiogenic shock requiring subclavian balloon pump. He is now s/p OHT 2/24/19 with Piyush House and Christopher.    - RHC and biopsy 3/4; 0R, no AMR  - RHC and biopsy 3/11; 0R, no AMR  - RHC and biopsy 3/18; 1R, no AMR   - RHC and biopsy 3/25;  0R, biopsy suggestive of AMR.   - RHC and biopsy 3/28; pending     Neuro:   - Neuro intact. ICU delirium improving. Head CT 3/9 without acute pathology.   - Hx Depression: pta cymbalta  - Sleep: melatonin   - Tylenol  and oxycodone  -  tramadol      CV:   - Hx of NICM, now S/P heart transplant 2/24. No arrhythmia, HD stable.  - ASA 81 mg, Crestor. Sildenafil 20 mg TID.   - Immunosuppression per cardiology   - RHC 3/25 Mild elevated filling pressures. Clinically stable off dobutamine.   - Removed lena drain to right pacer pocket 3/26.   - RHC 3/28/19: RA 9, PA 35/19 (25), PCWP 18, Jaden CO/CI 5.4/3.2  - Possible mild antibody medicated rejection on bx from 3/25, result from 3/28 pending. Cardiology following and with adjust immunosuppression.          Pulm:   - Hx intermittent asthma, pta Singulair.   - Pulm toilet, IS, activity and deep breathing   - Supplemental O2 PRN to keep sats > 92%. Wean off as tolerated.  - LLL consolidation, albuterol and mucomyst Nebs today x 2, assess response.   - Sputum culture showing Klebsiella- zosyn started 3/21, changed to bactrim with sensitivities, ID recommended Bactrim for 10 days.        ID:   - WBC 3.9, afebrile, no signs or symptoms of infection other than SOB. Completed 7 days cefepime/zosyn 3/16.   - Blood cultures 3/13: NGTD. C diff negative 3/17.    - Sputum culture positive 3/20, bactrim, ID consulted, continue bactrim for klebsiella pneumonia. Discuss duration with ID.       GI / FEN:  - Reg diet, bowel regimen. Calorie counts. Na 134  - Tube feeds were off 3/21 for nausea. New formula 3/22 pm. - removed NJ and stopped tube feeding 3/25, patient refusing TF since 3/20.   - Hx ulcerative colitis; pta mesalamine. Stool occult negative 3/20.  - Increased PPI to bid for GERD sx, reports having been diagnoses with Pierce's esophagus in the past.   - RUQ abdominal pain: RUQ US 3/26 showing mild gallbladder wall thickening with biliary sludge. No classic biliary symptoms, no fevers or leukocytosis. Biliary dyskinesia?      Renal / :   - Creatinine 2.50, improved UOP on Bumex.   - Nephrology following for oliguria and T/Th/Sa hemodialysis. Unable to get tunneled dialysis catheter due to  lena drain to old right pacer pocket. RIJ dialysis catheter no longer functional and dialysis unable to be performed 3/21. Patient is showing signs of renal recovery but still not enough to go completely without dialysis. RIJ dialysis catheter replaced on 3/22  - Has not had dialysis since 3/23, electrolytes stable. Nephrology want to hold off on dialysis again. Bumex challenge 3 IV 3/26 with 1.1L UOP.  Re dose Bumex 3/27, 875 of U/o. UA clean 3/26.  Will discuss with nephrology when RIJ can be removed since patient no longer needing dialysis. May want to watch his over weekend.   - Cardiology will decide whether to re dose lasix today.         Heme / Anticoagulation:  - Got 1 unit PRBC for Hgb 6.9 on 3/24, hgb 8.2 today, stable. No signs of bleeding.   - Will need coumadin eventually for SVC graft per Dr House. Will need to discuss with Cardiology, may prefer Apixaban due to frequent biopsies.    - No biopsies or central line access on left due to SVC graft.   - Holding Hep gtt since 3/18. Has R chest hematoma shouldn't interfere with RIGHT chest HD tunneled catheter placement. Chest CT 3/19 confirms R chest hematoma at old AICD site, s/p drainage in OR 3/21.     Endo:   - Hypothyroid: levothyroxine 100 mcg daily   - Sliding corrective scale qid      PPX:   - Protonix     Dispo:   - 6C since 3/16  - RUQ US and LFT's today  - ARU likely Monday if bed available. Will no longer need tunneled dialysis catheter.        Discussed with CVTS Fellow   Staff surgeons have been informed of changes through both  verbal and written communication.      Sheila Ann PA-C  Cardiothoracic Surgery  Pager [phone]  March 29, 2019

## 2019-03-29 NOTE — PROGRESS NOTES
[facility]   Cardiology II Service / Advanced Heart Failure  Daily Consult Note      Patient: Everton Larios  MRN: [id number]  Admission Date: 2/17/2019  Hospital Day # 40    Assessment and Plan: Everton Larios is a 54 year old male with history of hypothyroidism, asthma, UC, depression, etoh abuse, s/p orthotopic heart transplant 2/24/19 for non ischemic cardiomyopathy and cardiogenic shock requiring IABP. Post-op complicated by RV dysfunction and FRANKLIN requiring HD.     Recommendations:  -decrease pred per taper  -bumex 1 mg bid  -discontinue sildenafil     # Status post OHT on 2/24/19, history of NICM  # Chronic immunosuppression  * TTE 3/5/19: EF 60-65%, mild-mod RV dilation, global RV function mildly reduced, moderate TR, IVC normal without collapse   * TTE 3/26/19: EF 60-65%, moderate LVH, RV mod dilated and mod reduced function, IVC dilated  * RHC 3/28/19: RA 9, PA 35/19 (25), PCWP 18, Jaden CO/CI 5.4/3.2    Volume Status/Graft Function:   --Volume: RA 9, PCWP 18 yesterday, start bumex 1 mg bid  --BP: at goal not on Rx   --HR: 80s  --RV support: discontinue sildenafil, RVok by echo and RHC    Immunosuppression:   --did not receive Simulect  --continue prednisone per taper with negative bx, decrease to 10 mg bid  --continue Cellcept 1500 mg BID   --tacrolimus 2.5 mg bid, goal 8-10, 9.5 today (10 hr trough), check Sunday    Prophylaxis:   --CAV: holding aspirin 81 mg given bleeding post-op, rosuvastatin 10 mg daily  --Thrush: Nystatin swish and swallow  --PCP: pentamidine given 3/3, he is on Bactrim as below, change to DS twice weekly ppx when 10d course done  --GI: Protonix   --CMV: D+/R+, valcyte renally dosed through May, weekly CMV PCR for now per ID through mid April then per protocol, due 4/1  --Osteoporosis: calcium/vitamin D      Serostatus: CMV: D+/R+. EBV: D+/R+    Biopsies:   --#1 3/4: NER  --#2 3/11: NER  --#3 3/18: NER  --#4 3/25: 0R, +pAMR 1 +staining for c4d, graft function  "normal by echo but septum thick  --repeat bx 3/28 with improved AMR staining and less reactive capillaries per path  --#5 due 4/8  *No biopsies or central line access on left due to SVC graft    #FRANKLIN on iHD, oliguric, improving  -renal following  -aim for net even    #Klebsiella in sputum  Opacity by CXR 3/22 and GGO in right ling 3/19. S/p cefepime/zosyn x 7 days (-3/16), improved clinically. Now on 10d course of oral Bactrim per ID.    -monitor resp symptoms     ================================================================    Subjective/24-Hr Events:   Last 24 hr care team notes reviewed. No overnight events. Tolerating ambulation without lightheadedness. Denies fluid retention. Denies urinary symptoms or blood in urine today. No new complaints.     ROS:  4 point ROS including respiratory, CV, GI and  (other than that noted in the HPI) is negative.     Medications: Reviewed in EPIC.     Physical Exam:   /73 (BP Location: Left arm)   Pulse 88   Temp 98.2  F (36.8  C) (Oral)   Resp 18   Ht 1.727 m (5' 8\")   Wt 57.2 kg (126 lb 3.2 oz)   SpO2 99%   BMI 19.19 kg/m      GENERAL: Appears comfortable, in no distress.  HEENT: Eye symmetrical, no discharge or icterus bilaterally. Mucous membranes moist and without lesions.  NECK: Supple, JVD just above clavicle at 90 degrees.   CV: RRR, +S1S2, no murmur, rub, or gallop.   RESPIRATORY: Respirations regular, even, and unlabored. Lungs CTA throughout.   GI: Soft and non distended with normoactive bowel sounds present in all quadrants. No tenderness, rebound, guarding.   EXTREMITIES: 1+ ankle edema, otherwise no edema. 2+ bilateral pedal pulses.   NEUROLOGIC: Alert and oriented x 3. No focal deficits.   MUSCULOSKELETAL: No joint swelling or tenderness.   SKIN: No jaundice. No rashes or lesions. Sternum stable. Healing bruised on bilat arms.     Labs:  CMP  Recent Labs   Lab 03/29/19  0430 03/28/19  2353 03/28/19  0558 03/27/19  0521  03/26/19  0531     " --  134 133  --  131*   POTASSIUM 3.6 3.4 3.3* 3.5   < > 3.1*   CHLORIDE 102  --  100 100  --  98   CO2 18*  --  18* 20  --  18*   ANIONGAP 15*  --  15* 13  --  15*   GLC 94  --  142* 130*  --  133*   BUN 68*  --  76* 76*  --  74*   CR 2.50*  --  2.55* 2.47*  --  2.61*   GFRESTIMATED 28*  --  27* 28*  --  26*   GFRESTBLACK 32*  --  31* 33*  --  30*   RADAMES 7.9*  --  8.0* 7.9*  --  7.8*   MAG 1.9  --  2.2 2.2  --  2.3   PHOS 3.2  --  3.6 3.6  --  4.4   PROTTOTAL  --   --   --   --   --  5.0*   ALBUMIN  --   --   --   --   --  2.4*   BILITOTAL  --   --   --   --   --  0.5   ALKPHOS  --   --   --   --   --  110   AST  --   --   --   --   --  16   ALT  --   --   --   --   --  23    < > = values in this interval not displayed.       CBC  Recent Labs   Lab 03/29/19  0430 03/28/19  0558 03/27/19  0521 03/26/19  0531   WBC 5.4 4.6 3.9* 4.2   RBC 2.66* 2.65* 2.52* 2.51*   HGB 8.2* 8.1* 7.6* 7.5*   HCT 26.1* 25.9* 24.4* 24.3*   MCV 98 98 97 97   MCH 30.8 30.6 30.2 29.9   MCHC 31.4* 31.3* 31.1* 30.9*   RDW 19.6* 19.7* 19.4* 19.0*    255 187 161       INR  Recent Labs   Lab 03/27/19  0521 03/26/19  0531 03/25/19  0612 03/24/19  0552   INR 1.39* 1.38* 1.41* 1.40*       Time/Communication  I personally spent a total of 25 minutes. Of that 15 minutes was counseling/coordination of patient's care. Plan of care discussed with patient. See my note above for details.    Patient discussed with Dr. Donis.      Candida Srivastava DNP, NP-C  Advanced Heart Failure/Cardiology II Service  Pager [phone]

## 2019-03-29 NOTE — PROGRESS NOTES
Nephrology Progress Note  03/29/2019         Assessment & Recommendations:   Mr. Larios is a 53 year old male with history of chronic renal insufficiency, EtOH abuse, GIB w/splenic embolization, PAF s/p ablation and cardioversion, NICM c/b cardiogenic shock requiring IABP, s/p OHT (2/24/19) on tacrolimus and MMF, complicated by RV dysfunction per echo and acute oliguric kidney injury.      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 Baseline Crt 1.1- 1.4 over the last 6 months; however, prior BMPs have demonstrated patient Cr as high as 2.0 mg/dl. He most likely has a baseline level of CKD due to renal artery stenosis (longstanding smoking history, abdominal bruit on physical examination) and possible smaller FRANKLIN episodes from HFrEF exacerbations. Current rise in creatinine started 3/1 at 1.89, and increasing to 4.01 3/7 and oliguria is most likely due to nephrotoxicity associated with tacrolimus (a renal vasoconstrictor), and diuretic use. UOP improved but have not been measured accurately due to urination with frequent bowel movements.Suspect he could have some recovery of function. However, he had continued to have anephric rise in serum creatinine between dialysis treatments.   - Patient was last dialyzed on 03/23  -avoid nephrotoxins including contrast and NSAID's  -renally dose medications  - Cr stable , UOP improved, cont diuretics for volume.      # S/p OHT -on IS with MMF, Tacrolimus. Tac level was 7.7 this am. Managed by transplant team.      #Electrolytes:  -mild hyponatremia . Improved to 133 this am. Will consider fluid restriction if get worse.   -hypokalemia resolved.        #Acid Base status- AGMA likely secondary to FRANKLIN.  "Consider oral bicarb 650mg tid    #BP/Volume status:Acceptable BP. Patient appears euvolemic on exam. Will target EDW ~54 kgs, echo done shows still fluid overload   -daily weight, strict I/O  -- good response to Bumex dose, cont the same , no plan for dialysis      #Anemia Hgb7.6     #Thrombocytopenia: PLT improving      Recommendations were communicated to primary team via phone.  Patient seen and discussed  with Dr. Beverly Lewis  Nephrology Fellow      Interval History :   Nursing and provider notes from last 24 hours reviewed.  In the last 24 hours Everton Larios feels fine walk aroun dthe goss, denies any complaint, making urine ,CR stable,   Review of Systems:   I reviewed the following systems:  GI: ok appetite. no nausea or vomiting or diarrhea.   Neuro:  no confusion  Constitutional:  no fever or chills  CV: no dyspnea or edema.  no chest pain.    Physical Exam:   I/O last 3 completed shifts:  In: 1160 [P.O.:1140; I.V.:20]  Out: 1275 [Urine:1275]   /71 (BP Location: Left arm)   Pulse 88   Temp 98.2  F (36.8  C) (Oral)   Resp 17   Ht 1.727 m (5' 8\")   Wt 57.2 kg (126 lb 3.2 oz)   SpO2 99%   BMI 19.19 kg/m       GENERAL APPEARANCE: NAD  EYES:  NAD scleral icterus, pupils equal  HENT: mouth without ulcers or lesions  PULM: lungs clear to auscultation bilaterally, equal air movement, no clubbing  CV: regular rhythm, normal rate, no rub     -JVD no     -edema none   GI: soft, notender, nodistended, bowel sounds are +  INTEGUMENT: no cyanosis, no rash  NEURO:  no asterixis   Access none     Labs:   All labs reviewed by me  Electrolytes/Renal -   Recent Labs   Lab Test 03/29/19  0430 03/28/19  2353 03/28/19  0558 03/27/19  0521     --  134 133   POTASSIUM 3.6 3.4 3.3* 3.5   CHLORIDE 102  --  100 100   CO2 18*  --  18* 20   BUN 68*  --  76* 76*   CR 2.50*  --  2.55* 2.47*   GLC 94  --  142* 130*   RADAMES 7.9*  --  8.0* 7.9*   MAG 1.9  --  2.2 2.2   PHOS 3.2  --  3.6 3.6       CBC - "   Recent Labs   Lab Test 03/29/19  0430 03/28/19  0558 03/27/19  0521   WBC 5.4 4.6 3.9*   HGB 8.2* 8.1* 7.6*    255 187       LFTs -   Recent Labs   Lab Test 03/26/19  0531 03/18/19  0505 03/15/19  0409   ALKPHOS 110 137 88   BILITOTAL 0.5 0.8 0.8   ALT 23 57 24   AST 16 36 20   PROTTOTAL 5.0* 5.3* 4.4*   ALBUMIN 2.4* 2.6* 2.3*       Iron Panel -   Recent Labs   Lab Test 11/15/18  0955 10/11/18  1235   IRON  --  63   IRONSAT  --  14*   NATE 1,045*  --          Imaging:  All imaging studies reviewed by me.     Current Medications:    calcium carbonate 600 mg-vitamin D 400 units  1 tablet Oral BID w/meals     DULoxetine  20 mg Oral Daily     fluticasone  2 puff Inhalation Q12H     heparin lock flush  5-15 mL Intracatheter Q24H     levothyroxine  100 mcg Oral Daily     LORazepam  0.5 mg Oral Once     melatonin  3-9 mg Oral At Bedtime     mesalamine  2,400 mg Oral BID     montelukast  10 mg Oral At Bedtime     multivitamin RENAL  1 capsule Oral Daily     mycophenolate  1,500 mg Oral BID IS     nystatin  500,000 Units Oral 4x Daily     pantoprazole  40 mg Oral BID     [START ON 3/30/2019] predniSONE  10 mg Oral Daily     predniSONE  10 mg Oral QPM     rosuvastatin  10 mg Oral Daily     senna-docusate  1 tablet Oral BID    Or     senna-docusate  2 tablet Oral BID     sodium chloride (PF)  10 mL Intracatheter Q8H     sodium chloride (PF)  3 mL Intracatheter Q8H     sulfamethoxazole-trimethoprim  1 tablet Oral Daily     tacrolimus  2.5 mg Oral QPM     tacrolimus  2.5 mg Oral QAM     valGANciclovir  450 mg Oral Once per day on Mon Fri       - MEDICATION INSTRUCTIONS -       BETA BLOCKER NOT PRESCRIBED       Dontrell Lewis MD     I, Abhijeet Paul, saw this patient with Dr. Lewis and agree with the findings and plan of care as documented in the note.

## 2019-03-29 NOTE — PLAN OF CARE
D- OHT 2/24  I/A- NSR. RA. VSS. Dialysis line pulled by nephrology fellow, no bleeding. Denies pain and SOB. Tolerating diet, calorie counts.   P- Continue to monitor and notify team of changes.

## 2019-03-29 NOTE — PROGRESS NOTES
CLINICAL NUTRITION SERVICES     Nutrition Prescription    RECOMMENDATIONS FOR MDs/PROVIDERS TO ORDER:  Monitor potential need for feeding tube replacement if oral intake does not improve. Reorder kcal counts if oral intake is not improving.     Future/Additional Recommendations:  For all recommendations, see prior nutrition notes.   Consider an appetite stimulant, if appropriate.   Pt may need encouragement at meals to eat.      Diet: NPO currently. Previously regular diet order. Ordered to receive oral supplements.  Kcal counts:  3/25   720 kcals and 33 g protein (100% banana, pineapple, 75% cupcake and 100% 1 Gelatein Plus, 1 Boost Breeze  supplement/s recorded)  3/26   647 kcals and 43 g protein (100% grapes, 75% omelet w/ sausage & cheese and 100% 1 Gelatein Plus  supplement/s recorded)  3/27   315 kcals and 2 g protein (50% of two pancakes with butter and syrup and no supplement/s recorded) - Supper was recorded. Pt did not order lunch. For breakfast, he only ordered fruit which totaled 360 kcals and 5 g protein.  3/28   365 kcals and 3 g protein (One meal/s and no supplement/s recorded) - Pt ordered two meals yesterday which totaled 1080 kcals and 54 g protein. Kcal counts may not include all data.   * Not meeting estimated needs of 7295-9482+ kcals/day (30 - 35+ kcals/kg) and  grams protein/day (1.5 - 2 grams of pro/kg). Pt has been NPO, at times, due to tests/procedures. Generalized lack of appetite. Per discussion with pt, his appetite has improved since 3/25 but is not back to baseline. He has been skipping lunch due to his busy schedule and visitors (generally naps around noon). States his heartburn is ok, as long as he does not take potassium. Having less early satiety than previously.     INTERVENTIONS:  Implementation:  Nutrition education: Encouraged oral intake at meals.  Medical food supplement therapy: Changed oral supplements to send pineapple Gelatein Plus at 14:00 and chocolate Magic Cup  at HS.     Follow up/Monitoring:  Will continue to follow pt.    Jannie Guerrero, MS, RD, LD, CNSC   6C Pgr: [phone]

## 2019-03-29 NOTE — PLAN OF CARE
Discharge Planner OT   6C  Patient plan for discharge: rehab, worried about doing stairs when he gets home  Current status: Pt pleasant and agreeable today, improved activity tolerance for functional mobility in hallway with walker, close CGA and wc follow.  Tolerated 100' with no break, sat to enjoy the sunroom and recover. Needed many standing rest breaks to return to room due to MATA and fatigue.  Needing cues to adhere to sternal precautions during STS And needing increasing level of assist with fatigue. VSS  Barriers to return to prior living situation: precautions (until 4/7), fatigue, weakness, MATA  Recommendations for discharge: ARU  Rationale for recommendations: Pt presents with new deficits including weakness, poor activity tolerance, new precautions and recent falls leading to decreased function and safety. Pt unable to safely navigate home environment or stairs. Needs to achieve  Mod IND with walker and  stairs to return home. Pt will benefit from intensive, interdisciplinary ARU to address these deficits and to provide caregiver training to facilitate a safe dc to home.            Entered by: Monique Marsh 03/29/2019 11:15 AM

## 2019-03-29 NOTE — PLAN OF CARE
D: Pt admitted 2/17 with cardiogenic shock, s/p OHT 2/24. PMH: ETOH, hypothyroidism, asthma, ulcerative colitis, depression, NICM.     I/A: A&Ox4. Vital signs stable on RA. Monitor shows sinus rhythm, HR 80's. Denied any pain, only complaining of generalized abdominal discomfort. Voiding with adequate output. +BM overnight. Tolerating regular diet with 2L FR. Calorie counts in place. Blood sugars WNL, no sliding scale insulin necessary. Up with assist x1-2. Appeared to sleep well between cares, making needs known.      P: Continue to monitor. Notify CVTS with changes/concerns.

## 2019-03-29 NOTE — PLAN OF CARE
D/I/A. S/p OHT 2/24/19 for NICM and cardiogenic shock. Post-op complicated by RV dysfunction and FRANKLIN requiring HD. S/p heart bx today.  Pt is stable. ALOx4. Pain free. On RA. MATA. SR on tele. Appetite is good. +BM. Adequate UO. Coccyx is covered with Mepilex drsg.  R groin/bx site coverd with drsg. It is tender and w/o any hematoma or bleeding.  Up with walker and assist x 1-2 (pt had been refusing to use gait belt before allowing us to use it).  He had a near fall incident yesterday in the bathroom. Pt is calling appropriately.  Bed alarm on.  P. Continue to monitor.

## 2019-03-29 NOTE — PLAN OF CARE
PT 6C    Discharge Planner PT   Patient plan for discharge: ARU  Current status: transferred to EOB with SBA.  Required mod assist for sit > stand from EOB when not using UE for transfer.  Ambulated in halls without single UE support to work on gait/dynamic balance.   Barriers to return to prior living situation: assistance needed for safe mobility  Recommendations for discharge: ARU  Rationale for recommendations: dependence with functional mobility.        Entered by: August Flores 03/29/2019 4:00 PM

## 2019-03-30 ENCOUNTER — APPOINTMENT (OUTPATIENT)
Dept: OCCUPATIONAL THERAPY | Facility: CLINIC | Age: 54
DRG: 001 | End: 2019-03-30
Attending: INTERNAL MEDICINE
Payer: COMMERCIAL

## 2019-03-30 LAB
ANION GAP SERPL CALCULATED.3IONS-SCNC: 14 MMOL/L (ref 3–14)
ANION GAP SERPL CALCULATED.3IONS-SCNC: 15 MMOL/L (ref 3–14)
BUN SERPL-MCNC: 63 MG/DL (ref 7–30)
BUN SERPL-MCNC: 70 MG/DL (ref 7–30)
CALCIUM SERPL-MCNC: 7.8 MG/DL (ref 8.5–10.1)
CALCIUM SERPL-MCNC: 7.8 MG/DL (ref 8.5–10.1)
CHLORIDE SERPL-SCNC: 100 MMOL/L (ref 94–109)
CHLORIDE SERPL-SCNC: 102 MMOL/L (ref 94–109)
CO2 SERPL-SCNC: 18 MMOL/L (ref 20–32)
CO2 SERPL-SCNC: 20 MMOL/L (ref 20–32)
CREAT SERPL-MCNC: 2.25 MG/DL (ref 0.66–1.25)
CREAT SERPL-MCNC: 2.39 MG/DL (ref 0.66–1.25)
ERYTHROCYTE [DISTWIDTH] IN BLOOD BY AUTOMATED COUNT: 19.1 % (ref 10–15)
GFR SERPL CREATININE-BSD FRML MDRD: 29 ML/MIN/{1.73_M2}
GFR SERPL CREATININE-BSD FRML MDRD: 31 ML/MIN/{1.73_M2}
GLUCOSE SERPL-MCNC: 101 MG/DL (ref 70–99)
GLUCOSE SERPL-MCNC: 105 MG/DL (ref 70–99)
HCT VFR BLD AUTO: 25 % (ref 40–53)
HGB BLD-MCNC: 7.7 G/DL (ref 13.3–17.7)
MAGNESIUM SERPL-MCNC: 1.7 MG/DL (ref 1.6–2.3)
MCH RBC QN AUTO: 30.1 PG (ref 26.5–33)
MCHC RBC AUTO-ENTMCNC: 30.8 G/DL (ref 31.5–36.5)
MCV RBC AUTO: 98 FL (ref 78–100)
PHOSPHATE SERPL-MCNC: 2.8 MG/DL (ref 2.5–4.5)
PLATELET # BLD AUTO: 260 10E9/L (ref 150–450)
POTASSIUM SERPL-SCNC: 3.4 MMOL/L (ref 3.4–5.3)
POTASSIUM SERPL-SCNC: 3.4 MMOL/L (ref 3.4–5.3)
RBC # BLD AUTO: 2.56 10E12/L (ref 4.4–5.9)
SODIUM SERPL-SCNC: 134 MMOL/L (ref 133–144)
SODIUM SERPL-SCNC: 135 MMOL/L (ref 133–144)
WBC # BLD AUTO: 5.2 10E9/L (ref 4–11)

## 2019-03-30 PROCEDURE — 99232 SBSQ HOSP IP/OBS MODERATE 35: CPT | Performed by: NURSE PRACTITIONER

## 2019-03-30 PROCEDURE — 25000132 ZZH RX MED GY IP 250 OP 250 PS 637: Performed by: NURSE PRACTITIONER

## 2019-03-30 PROCEDURE — 25000125 ZZHC RX 250: Performed by: NURSE PRACTITIONER

## 2019-03-30 PROCEDURE — 21400000 ZZH R&B CCU UMMC

## 2019-03-30 PROCEDURE — 25000132 ZZH RX MED GY IP 250 OP 250 PS 637: Performed by: STUDENT IN AN ORGANIZED HEALTH CARE EDUCATION/TRAINING PROGRAM

## 2019-03-30 PROCEDURE — 25000125 ZZHC RX 250: Performed by: INTERNAL MEDICINE

## 2019-03-30 PROCEDURE — 25000128 H RX IP 250 OP 636: Performed by: PHYSICIAN ASSISTANT

## 2019-03-30 PROCEDURE — 36592 COLLECT BLOOD FROM PICC: CPT | Performed by: THORACIC SURGERY (CARDIOTHORACIC VASCULAR SURGERY)

## 2019-03-30 PROCEDURE — 83735 ASSAY OF MAGNESIUM: CPT | Performed by: THORACIC SURGERY (CARDIOTHORACIC VASCULAR SURGERY)

## 2019-03-30 PROCEDURE — 36592 COLLECT BLOOD FROM PICC: CPT | Performed by: NURSE PRACTITIONER

## 2019-03-30 PROCEDURE — 40000802 ZZH SITE CHECK

## 2019-03-30 PROCEDURE — 85027 COMPLETE CBC AUTOMATED: CPT | Performed by: THORACIC SURGERY (CARDIOTHORACIC VASCULAR SURGERY)

## 2019-03-30 PROCEDURE — 25000132 ZZH RX MED GY IP 250 OP 250 PS 637: Performed by: PHYSICIAN ASSISTANT

## 2019-03-30 PROCEDURE — 25000131 ZZH RX MED GY IP 250 OP 636 PS 637: Performed by: HOSPITALIST

## 2019-03-30 PROCEDURE — 25000131 ZZH RX MED GY IP 250 OP 636 PS 637: Performed by: NURSE PRACTITIONER

## 2019-03-30 PROCEDURE — 80048 BASIC METABOLIC PNL TOTAL CA: CPT | Performed by: NURSE PRACTITIONER

## 2019-03-30 PROCEDURE — 97110 THERAPEUTIC EXERCISES: CPT | Mod: GO | Performed by: OCCUPATIONAL THERAPIST

## 2019-03-30 PROCEDURE — 25000132 ZZH RX MED GY IP 250 OP 250 PS 637: Performed by: INTERNAL MEDICINE

## 2019-03-30 PROCEDURE — 25000128 H RX IP 250 OP 636: Performed by: NURSE PRACTITIONER

## 2019-03-30 PROCEDURE — 80048 BASIC METABOLIC PNL TOTAL CA: CPT | Performed by: THORACIC SURGERY (CARDIOTHORACIC VASCULAR SURGERY)

## 2019-03-30 PROCEDURE — 84100 ASSAY OF PHOSPHORUS: CPT | Performed by: THORACIC SURGERY (CARDIOTHORACIC VASCULAR SURGERY)

## 2019-03-30 RX ORDER — POTASSIUM CHLORIDE 750 MG/1
40 TABLET, EXTENDED RELEASE ORAL 2 TIMES DAILY
Status: DISCONTINUED | OUTPATIENT
Start: 2019-03-30 | End: 2019-03-31

## 2019-03-30 RX ORDER — MAGNESIUM SULFATE HEPTAHYDRATE 40 MG/ML
2 INJECTION, SOLUTION INTRAVENOUS ONCE
Status: COMPLETED | OUTPATIENT
Start: 2019-03-30 | End: 2019-03-30

## 2019-03-30 RX ORDER — BUMETANIDE 1 MG/1
1 TABLET ORAL
Status: COMPLETED | OUTPATIENT
Start: 2019-03-30 | End: 2019-03-30

## 2019-03-30 RX ORDER — BUMETANIDE 2 MG/1
2 TABLET ORAL
Status: DISCONTINUED | OUTPATIENT
Start: 2019-03-31 | End: 2019-04-01

## 2019-03-30 RX ORDER — BUMETANIDE 0.25 MG/ML
2 INJECTION INTRAMUSCULAR; INTRAVENOUS ONCE
Status: COMPLETED | OUTPATIENT
Start: 2019-03-30 | End: 2019-03-30

## 2019-03-30 RX ORDER — POTASSIUM CHLORIDE 750 MG/1
20 TABLET, EXTENDED RELEASE ORAL DAILY
Status: DISCONTINUED | OUTPATIENT
Start: 2019-03-30 | End: 2019-03-31 | Stop reason: DRUGHIGH

## 2019-03-30 RX ADMIN — POTASSIUM CHLORIDE 40 MEQ: 750 TABLET, EXTENDED RELEASE ORAL at 20:56

## 2019-03-30 RX ADMIN — Medication 1 CAPSULE: at 08:02

## 2019-03-30 RX ADMIN — FLUTICASONE PROPIONATE 2 PUFF: 220 AEROSOL, METERED RESPIRATORY (INHALATION) at 08:03

## 2019-03-30 RX ADMIN — MYCOPHENOLATE MOFETIL 1500 MG: 500 TABLET, FILM COATED ORAL at 17:04

## 2019-03-30 RX ADMIN — Medication 5 ML: at 20:47

## 2019-03-30 RX ADMIN — PREDNISONE 10 MG: 10 TABLET ORAL at 08:02

## 2019-03-30 RX ADMIN — TACROLIMUS 2.5 MG: 1 CAPSULE ORAL at 08:01

## 2019-03-30 RX ADMIN — PANTOPRAZOLE SODIUM 40 MG: 40 TABLET, DELAYED RELEASE ORAL at 08:02

## 2019-03-30 RX ADMIN — Medication 1 TABLET: at 17:00

## 2019-03-30 RX ADMIN — SENNOSIDES AND DOCUSATE SODIUM 1 TABLET: 8.6; 5 TABLET ORAL at 20:48

## 2019-03-30 RX ADMIN — MESALAMINE 2400 MG: 800 TABLET, DELAYED RELEASE ORAL at 08:00

## 2019-03-30 RX ADMIN — ROSUVASTATIN CALCIUM 10 MG: 10 TABLET, FILM COATED ORAL at 08:02

## 2019-03-30 RX ADMIN — MAGNESIUM SULFATE HEPTAHYDRATE 2 G: 40 INJECTION, SOLUTION INTRAVENOUS at 10:10

## 2019-03-30 RX ADMIN — PANTOPRAZOLE SODIUM 40 MG: 40 TABLET, DELAYED RELEASE ORAL at 20:48

## 2019-03-30 RX ADMIN — DULOXETINE 20 MG: 20 CAPSULE, DELAYED RELEASE ORAL at 08:02

## 2019-03-30 RX ADMIN — MELATONIN TAB 3 MG 6 MG: 3 TAB at 20:49

## 2019-03-30 RX ADMIN — MESALAMINE 2400 MG: 800 TABLET, DELAYED RELEASE ORAL at 20:48

## 2019-03-30 RX ADMIN — Medication 1 TABLET: at 08:03

## 2019-03-30 RX ADMIN — SENNOSIDES AND DOCUSATE SODIUM 1 TABLET: 8.6; 5 TABLET ORAL at 08:06

## 2019-03-30 RX ADMIN — SULFAMETHOXAZOLE AND TRIMETHOPRIM 1 TABLET: 800; 160 TABLET ORAL at 08:01

## 2019-03-30 RX ADMIN — LEVOTHYROXINE SODIUM 100 MCG: 100 TABLET ORAL at 08:01

## 2019-03-30 RX ADMIN — Medication 500000 UNITS: at 15:49

## 2019-03-30 RX ADMIN — BUMETANIDE 1 MG: 1 TABLET ORAL at 08:01

## 2019-03-30 RX ADMIN — Medication 500000 UNITS: at 11:19

## 2019-03-30 RX ADMIN — FLUTICASONE PROPIONATE 2 PUFF: 220 AEROSOL, METERED RESPIRATORY (INHALATION) at 20:46

## 2019-03-30 RX ADMIN — BUMETANIDE 2 MG: 0.25 INJECTION INTRAMUSCULAR; INTRAVENOUS at 14:35

## 2019-03-30 RX ADMIN — MELATONIN TAB 3 MG 3 MG: 3 TAB at 02:08

## 2019-03-30 RX ADMIN — Medication 500000 UNITS: at 20:47

## 2019-03-30 RX ADMIN — POTASSIUM CHLORIDE 20 MEQ: 750 TABLET, EXTENDED RELEASE ORAL at 08:11

## 2019-03-30 RX ADMIN — Medication 5 ML: at 11:19

## 2019-03-30 RX ADMIN — MYCOPHENOLATE MOFETIL 1500 MG: 500 TABLET, FILM COATED ORAL at 08:01

## 2019-03-30 RX ADMIN — CALCIUM CARBONATE (ANTACID) CHEW TAB 500 MG 500 MG: 500 CHEW TAB at 10:39

## 2019-03-30 RX ADMIN — POTASSIUM CHLORIDE 20 MEQ: 750 TABLET, EXTENDED RELEASE ORAL at 10:36

## 2019-03-30 RX ADMIN — Medication 5 ML: at 11:18

## 2019-03-30 RX ADMIN — Medication 500000 UNITS: at 08:02

## 2019-03-30 RX ADMIN — TACROLIMUS 2.5 MG: 1 CAPSULE ORAL at 17:00

## 2019-03-30 RX ADMIN — PREDNISONE 10 MG: 10 TABLET ORAL at 20:49

## 2019-03-30 RX ADMIN — MONTELUKAST SODIUM 10 MG: 10 TABLET, COATED ORAL at 22:18

## 2019-03-30 RX ADMIN — BUMETANIDE 1 MG: 1 TABLET ORAL at 15:47

## 2019-03-30 ASSESSMENT — PAIN DESCRIPTION - DESCRIPTORS: DESCRIPTORS: SORE

## 2019-03-30 ASSESSMENT — ACTIVITIES OF DAILY LIVING (ADL)
ADLS_ACUITY_SCORE: 10

## 2019-03-30 ASSESSMENT — MIFFLIN-ST. JEOR: SCORE: 1385.56

## 2019-03-30 NOTE — PROGRESS NOTES
"Transplant Social Work Services Progress Note      Date of Initial Social Work Evaluation: 11/16/18, 1/30/19  Collaborated with: Pt and [facility] ARU admissions     Data: SW continuing to follow for emotional support and discharge planning. Anticipated discharge on Monday. Writer contacted  ARU liaison and anticipate being able to take on Monday. Pt does not require dialysis.     Intervention: Emotional Support, Discharge Planning   Assessment: Pt receptive to SW visit and appropriately engaged in discussion with writer. Pt sitting in bed trying to pay bills. Pt expressed feeling much better then yesterday and states \"I was really crabby yesterday\". Provided supportive listening and normalized his feelings. Pt is eager for transition to ARU and anticipates discharge Monday, per rounding this morning by medical team.   Education provided by SW: Discharge Planning, Ongoing SW support   Plan:    Discharge Plans in Progress: Anticipate discharge to ARU on Monday    Barriers to d/c plan: Medical Stability     Follow up Plan: SW to f/u Monday and coordinate discharge with  ARU.   "

## 2019-03-30 NOTE — PLAN OF CARE
D: Patient admitted 2/17/19 with cardiogenic shock requiring balloon pump now s/p heart transplant on 2/24/19 c/b ICU delirium and FRANKLIN requiring dialysis. PMHx NICM, ICD placement, paroxysmal a-fib, ulcerative colitis, ETOH abuse, hypothyroidism, degenerative joint disease, Pierce's esophagus, and depression.    I/A: Monitored vitals and assessed patient status. A0x4. VSS. NSR 80's. Room air. Afebrile. Urinating adequately. Ambulates with 1 person assist and gait belt. Up to chair most of day. Repositions independently. Hemodialysis line removed Friday - no longer needs dialysis. NO central IV lines on the left upper body - patient has a surgical graft for left superior vena cava. K = 3.4, replaced with 40 mEq - recheck this evening. Mag = 1.7, replaced with 2 g IV.     Significant events:  3/20 - Sputum culture + for klebsiella pneumonia (on Bactrim)  3/24 - Hgb 6.9, received 1 unit PRBC --> recheck 7.6, stable    P: Continue to monitor patient status and report changes to treatment team. Discharge to ARU likely on Monday pending bed availability.

## 2019-03-30 NOTE — CONSULTS
[facility]   Cardiology II Service / Advanced Heart Failure  Daily Progress Note  Date of Service: 3/30/2019      Patient: Everton Larios  MRN: [id number]  Admission Date: 2/17/2019  Hospital Day # 41    Assessment and Plan: Everton Larios is a 54 year old male with a past medical history of alcohol abuse, hypothyroidism, asthma, ulcerative colitis, depression, NICM with cardiogenic shock requiring IABP.  He is now s/p OHT on 2/24/19.  His post operative course was complicated by RV dysfunction and FRANKLIN requiring hemodialysis.  We were consulted by CVTS for immunosuppression management.     Today's Plan:  1. Increase Bumex to 1 mg oral twice daily today with a 2 mg IV bolus.  2. Start potassium 40 meq twice daily.  Repeat BMP at 5 pm.  3. Start Bumex 2 mg twice daily tomorrow.   4. Start Mag oxide 400 mg daily.  Continue trending magnesium levels.    History of: NICM  S/p OHT on 2/24/19  Serostatus:  CMV+, EBV+, HSV1+, HSV2 neg, VZV+;     Immunosuppression:  Did not received Simulect induction.   Cellcept:   1500 mg twice daily  Tacrolimus:  2.5 mg twice daily. Tacro goal: 10-12.  Tacro level 3/29: 9.5.  Continue checking levels every 48 hours. Next level due tomorrow.   Prednisone:  10 mg twice daily.    Prophylaxis:  CAV:  ASA 81 mg daily.  Rosuvastatin 10 mg daily   Thrush:  Nystatin 500,000 units four times daily.   GI:  Protonix mg 40 mg twice daily.   CMV D+/R+, EBV D+/R+, HSV1+, HSV2 neg, VZV+; Valcyte 450 mg twice a week. .  Weekly CMV PCR for now per ID through mid April.  Due 4/1  Osteoporosis prevention: Calcium/Vitamin D 600/400 mg twice daily  PCP prophylaxis: After 10 day course of treatment for pneumonia, decrease Bactrim to 800/160 mg daily on T, Thurs, Saturday  Received pentamidine given 3/3    Studies:  ECHO: 3/26/19:  Moderate concentric wall thickening consistent with left ventricular hypertrophy is present. Global and regional left ventricular function is normal with an  EF of 60-65%. Right ventricle is moderately dilated with hypertrophy and systolic dysfunction that is slightly worse than on 3/5/19. IVC is dilated and RA pressure estimated 15 mmHg.  RHC: 3/11/19:  RA 27/23/17, RV 40/15, PA 40/19/30, PCW 19, PA sat 52.5%, PCW sat 96.1%, Hgb 7.5 g/dL, Jaden CO 4.7, Jaden CI 2.8, TD CO 5.0, TD CI 2.9   PVR 2.4       BIOPSIES/HISTORY OF GRAFT REJECTION:     3/4:     0R, no AMR  3/11:   0R, no AMR  3/18    1R, no AMR  3/25:   0R, suggestive of AMR  3/28:   0R    Fluid Status: Hypervolemic.  Increase Bumex to 1 mg twice daily with 2 mg IV bolus today, then increase to 2 mg twice daily tomorrow.    MAPS: Some elevated readings with systolic >130/diastolic >90 overnight.  Controlled during the day.  Continue to monitor.   Other:  RV function mildly to moderately reduced per last echo.     Klebsiella in sputum:  Chest xray done 3/22 with ground glass opacity in right lung 3/19.  S/p Cefepime/zosyn x 7 days (completed on 3/16), now on 10 day course of Bactrim per ID's recommendations.    -Continue Bactrim 10 day course.  Last day: due 4/5/19    FRANKLIN on CKD: Improving.  -Renal consulted and following.  Signed off. Needs nephrology appointment once outpatient.    -Creatinine today 2.25.  (previously 2.50).  Continue diuresis plan as outlined above.    -Repeat BMP this afternoon.   -Continue to trend daily levels.       ================================================================    Interval History/ROS: Still getting SOB with minimal exertion.  He is able to walk down one way down the nursing goss and then needs to stop and rest before he can turn back and go back to his room. No SOB at rest.  +orthopnea. No PND.  Feet are painful and extremely edematous.  Appetite improving. No fever, chill, palpitations.    Last 24 hr care team notes reviewed.   ROS:  4 point ROS including Respiratory, CV, GI and , other than that noted in the HPI, is negative.     Medications: Reviewed in EPIC.  "    Physical Exam:   BP (!) 141/93   Pulse 88   Temp 98.3  F (36.8  C) (Oral)   Resp 18   Ht 1.727 m (5' 8\")   Wt 58.1 kg (128 lb 1.6 oz)   SpO2 99%   BMI 19.48 kg/m    GENERAL: Alert and oriented times three. Comfortable.  No acute distress.  HEENT: EOM's conjugate. Mucous membranes moist and without lesions.  NECK: Supple and without lymphadenopathy. JVD 11 cm.  No carotid bruits.  CV: Rhythm regular S1S2 present without murmur, rub, or gallop.   RESPIRATORY: Respirations regular, even, and unlabored. Rales left base. No accessory muscle use.  No rhonchi or wheezing.   GI: Soft and non distended with normoactive bowel sounds present in all quadrants. No tenderness, rebound, guarding. No hepatomegaly.   EXTREMITIES: Extremites tender to the touch.  3+ peripheral edema in feet. Feet tender to touch and are taut.  2+ bilateral pedal pulses. No clubbing.  NEUROLOGIC: Alert and orientated x 3. CN II-XII grossly intact. No focal deficits.   MUSCULOSKELETAL: No joint swelling or tenderness. No acute deformities.  SKIN: No cyanosis. No rashes or lesions. Sternal incisions healing well.     Data:  CMP  Recent Labs   Lab 03/30/19  0531 03/29/19  0430 03/28/19  2353 03/28/19  0558 03/27/19  0521  03/26/19  0531    134  --  134 133  --  131*   POTASSIUM 3.4 3.6 3.4 3.3* 3.5   < > 3.1*   CHLORIDE 102 102  --  100 100  --  98   CO2 18* 18*  --  18* 20  --  18*   ANIONGAP 14 15*  --  15* 13  --  15*   * 94  --  142* 130*  --  133*   BUN 70* 68*  --  76* 76*  --  74*   CR 2.25* 2.50*  --  2.55* 2.47*  --  2.61*   GFRESTIMATED 31* 28*  --  27* 28*  --  26*   GFRESTBLACK 36* 32*  --  31* 33*  --  30*   RADAMES 7.8* 7.9*  --  8.0* 7.9*  --  7.8*   MAG 1.7 1.9  --  2.2 2.2  --  2.3   PHOS 2.8 3.2  --  3.6 3.6  --  4.4   PROTTOTAL  --   --   --   --   --   --  5.0*   ALBUMIN  --   --   --   --   --   --  2.4*   BILITOTAL  --   --   --   --   --   --  0.5   ALKPHOS  --   --   --   --   --   --  110   AST  --   --   --   " --   --   --  16   ALT  --   --   --   --   --   --  23    < > = values in this interval not displayed.     CBC  Recent Labs   Lab 03/30/19  0531 03/29/19  0430 03/28/19  0558 03/27/19  0521   WBC 5.2 5.4 4.6 3.9*   RBC 2.56* 2.66* 2.65* 2.52*   HGB 7.7* 8.2* 8.1* 7.6*   HCT 25.0* 26.1* 25.9* 24.4*   MCV 98 98 98 97   MCH 30.1 30.8 30.6 30.2   MCHC 30.8* 31.4* 31.3* 31.1*   RDW 19.1* 19.6* 19.7* 19.4*    264 255 187     INR  Recent Labs   Lab 03/27/19  0521 03/26/19  0531 03/25/19  0612 03/24/19  0552   INR 1.39* 1.38* 1.41* 1.40*       Patient seen and discussed with Dr. Donis.        My Norton APRN, CNP  Cards II  Pager [phone]    3/30/2019

## 2019-03-30 NOTE — PROGRESS NOTES
CVTS Daily Note  3/30/2019  Attending: Dewayen House, *    S:   No overnight events. HD line out yesterday.   Pt seen at bedside resting comfortably.    Does complain of foot edema, otherwise no acute complaints.      Denies F/C/Sweats.  No CP, SOB, or calf pain.    Tolerating diet better.  + BM.  + Flatus.    Ambulated well with less assistance.    Pain level tolerable. Plan as per Neuro section below.     + 1 kg since admit and trending up x 1 day    O:   Vitals:    03/29/19 1549 03/29/19 1908 03/29/19 2125 03/30/19 0217   BP: 114/71 111/74 115/79 (!) 141/93   BP Location: Left arm Left arm Left arm    Pulse:    88   Resp: 17 16 20 20   Temp: 98.2  F (36.8  C) 97.9  F (36.6  C) 98  F (36.7  C) 98.3  F (36.8  C)   TempSrc: Oral Oral Oral Oral   SpO2: 99% 100% 100% 100%   Weight:    58.1 kg (128 lb 1.6 oz)   Height:         Vitals:    03/27/19 0551 03/29/19 0049 03/30/19 0217   Weight: 57.1 kg (125 lb 12.8 oz) 57.2 kg (126 lb 3.2 oz) 58.1 kg (128 lb 1.6 oz)       Intake/Output Summary (Last 24 hours) at 3/30/2019 0745  Last data filed at 3/30/2019 0700  Gross per 24 hour   Intake 540 ml   Output 1100 ml   Net -560 ml       MAPs: 91 - 114   Gen: AAO x 3, pleasant, NAD  CV: RRR, S1S2 normal, no murmurs, rubs, or gallops. Pericardial fluid sounds?   Pulm: CTA, no rhonchi or wheezes  Abd: soft, non-tender, no guarding  Ext: trace peripheral edema  Incision: clean, dry, intact, no erythema  Hematoma drian site: soiled dressing      Labs:  BMP  Recent Labs   Lab 03/30/19  0531 03/29/19  0430 03/28/19  2353 03/28/19  0558 03/27/19  0521    134  --  134 133   POTASSIUM 3.4 3.6 3.4 3.3* 3.5   CHLORIDE 102 102  --  100 100   RADAMES 7.8* 7.9*  --  8.0* 7.9*   CO2 18* 18*  --  18* 20   BUN 70* 68*  --  76* 76*   CR 2.25* 2.50*  --  2.55* 2.47*   * 94  --  142* 130*     CBC  Recent Labs   Lab 03/30/19  0531 03/29/19  0430 03/28/19  0558 03/27/19  0521   WBC 5.2 5.4 4.6 3.9*   RBC 2.56* 2.66* 2.65* 2.52*   HGB  7.7* 8.2* 8.1* 7.6*   HCT 25.0* 26.1* 25.9* 24.4*   MCV 98 98 98 97   MCH 30.1 30.8 30.6 30.2   MCHC 30.8* 31.4* 31.3* 31.1*   RDW 19.1* 19.6* 19.7* 19.4*    264 255 187     INR  Recent Labs   Lab 03/27/19  0521 03/26/19  0531 03/25/19  0612 03/24/19  0552   INR 1.39* 1.38* 1.41* 1.40*      Hepatic Panel   Lab Results   Component Value Date    AST 16 03/26/2019     Lab Results   Component Value Date    ALT 23 03/26/2019     Lab Results   Component Value Date    ALBUMIN 2.4 03/26/2019     GLUCOSE:   Recent Labs   Lab 03/30/19  0531 03/29/19  0430 03/29/19  0211 03/28/19  2211 03/28/19  1732 03/28/19  1208 03/28/19  0752 03/28/19  0558 03/28/19  0134  03/27/19  0521  03/26/19  0531  03/25/19  0612   * 94  --   --   --   --   --  142*  --   --  130*  --  133*  --  115*   BGM  --   --  99 140* 209* 82 137*  --  185*   < >  --    < >  --    < >  --     < > = values in this interval not displayed.       Imaging:  reviewed recent imaging      A/P:  Everton Larios is a 53 year old male with PMH etoh abuse, hypothyroidism, intermittent asthma, ulcerative colitis, Depression, Chronic HFrEF, NICM admitted with cardiogenic shock requiring subclavian balloon pump. He is now s/p OHT 2/24/19 with Piyush House and Christopher.    - RHC and biopsy 3/4;   0R, no AMR  - RHC and biopsy 3/11; 0R, no AMR  - RHC and biopsy 3/18; 1R, no AMR   - RHC and biopsy 3/25;  0R, biopsy suggestive of AMR.   - RHC and biopsy 3/28;  0R, no AMR      Neuro:   - Neuro intact. ICU delirium improving. Head CT 3/9 without acute pathology.   - Hx Depression: pta cymbalta  - Sleep: melatonin   - Tylenol and oxycodone PRN for pain      CV:   - Hx of NICM, now S/P heart transplant 2/24. No arrhythmia, HD stable.  - ASA 81 mg, Crestor. Sildenafil 20 mg TID.   - Immunosuppression per cardiology   - RHC 3/25 Mild elevated filling pressures. Clinically stable off dobutamine.   - Removed lena drain to right pacer pocket 3/26.   - RHC 3/28/19: RA 9, PA  35/19 (25), PCWP 18, Jaden CO/CI 5.4/3.2  - Possible mild antibody medicated rejection on bx from 3/25, result from 3/28 was no rejection. Cardiology following and with adjust immunosuppression.      Pulm:   - Hx intermittent asthma, pta Singulair.   - Pulm toilet, IS, activity and deep breathing   - Supplemental O2 PRN to keep sats > 92%. Wean off as tolerated.  - LLL consolidation, albuterol and mucomyst Nebs today x 2, assess response.   - Sputum culture showing Klebsiella- zosyn started 3/21, changed to bactrim with sensitivities, ID recommended Bactrim for 10 days.        ID:   - WBC 5.2, afebrile, no signs or symptoms of infection other than SOB. Completed 7 days cefepime/zosyn 3/16.   - Blood cultures 3/13: NGTD. C diff negative 3/17.    - Sputum culture positive 3/20, bactrim, ID consulted, continue bactrim for klebsiella pneumonia. Discuss duration with ID.       GI / FEN:  - Reg diet, bowel regimen. Calorie counts. Na 134  - Tube feeds were off 3/21 for nausea. New formula 3/22 pm. - removed NJ and stopped tube feeding 3/25, patient refusing TF since 3/20.   - Hx ulcerative colitis; pta mesalamine. Stool occult negative 3/20.  - Increased PPI to bid for GERD sx, reports having been diagnoses with Pierce's esophagus in the past.   - RUQ abdominal pain: RUQ US 3/26 showing mild gallbladder wall thickening with biliary sludge. No classic biliary symptoms, no fevers or leukocytosis. Biliary dyskinesia?      Renal / :   - Creatinine 2.25, improved UOP, on PO Bumex.   - Nephrology following for oliguria and T/Th/Sa hemodialysis. Unable to get tunneled dialysis catheter due to lena drain to old right pacer pocket. RIJ dialysis catheter no longer functional and dialysis unable to be performed 3/21. Patient is showing signs of renal recovery but still not enough to go completely without dialysis. RIJ dialysis catheter replaced on 3/22  - Has not had dialysis since 3/23, electrolytes stable. Nephrology believes  he will no longer need dialysis. Bumex challenge 3 IV 3/26 with 1.1L UOP.  Re dose Bumex 3/27, 875 of U/o. UA clean 3/26.  - Cardiology will follow for diuresis. Planning Bumex 1 BID with 40 mEq K BID      Heme / Anticoagulation:  - Got 1 unit PRBC for Hgb 6.9 on 3/24, hgb 7.7, stable. No signs of bleeding.   - Will need coumadin eventually for SVC graft per Dr House. Will need to discuss with Cardiology, may prefer Apixaban due to frequent biopsies.    - No biopsies or central line access on left due to SVC graft.   - Holding Hep gtt since 3/18. Has R chest hematoma shouldn't interfere with RIGHT chest HD tunneled catheter placement. Chest CT 3/19 confirms R chest hematoma at old AICD site, s/p drainage in OR 3/21.      Endo:   - Hypothyroid: levothyroxine 100 mcg daily   - Sliding corrective scale qid      PPX:   - Protonix     Dispo:   - 6C since 3/16  - ARU likely Monday if bed available. No dialysis needed.       Discussed with CVTS Fellow   Staff surgeons have been informed of changes through both  verbal and written communication.      Arcadio Blackburn PA-C  Cardiothoracic Surgery  Pager [phone]    7:45 AM   March 30, 2019

## 2019-03-30 NOTE — PLAN OF CARE
Problem: Goal Outcome Summary  Goal: Goal Outcome Summary  RN  1. Pain will be controlled  2. Pt will have adequate po nutrition  3. Pt will be hemodynamically stable  4. Pt will remain free of falls    Outcome: Improving    D: Admitted 02/17 with cardiogenic shock requiring IABP now s/p OHT on 2/24/19. Pt has h/o chronic HF, NICM, alcohol abuse, hypothyroidism, asthma, ulcerative colitis, and depression.  I: Monitored vitals and assessed pt status.  Changed: Rt internal jugular dialysis catheter removed.  PRN: Melatonin  A: A0x4. VSS. Room air. SR. Afebrile. Patient sleeping between cares.   P: Will continue to monitor and manage pt per plan of care. Please report any pertinent changes in pt's condition.

## 2019-03-31 ENCOUNTER — APPOINTMENT (OUTPATIENT)
Dept: OCCUPATIONAL THERAPY | Facility: CLINIC | Age: 54
DRG: 001 | End: 2019-03-31
Attending: INTERNAL MEDICINE
Payer: COMMERCIAL

## 2019-03-31 LAB
ANION GAP SERPL CALCULATED.3IONS-SCNC: 13 MMOL/L (ref 3–14)
BUN SERPL-MCNC: 61 MG/DL (ref 7–30)
CALCIUM SERPL-MCNC: 7.8 MG/DL (ref 8.5–10.1)
CHLORIDE SERPL-SCNC: 104 MMOL/L (ref 94–109)
CO2 SERPL-SCNC: 17 MMOL/L (ref 20–32)
CREAT SERPL-MCNC: 2.19 MG/DL (ref 0.66–1.25)
ERYTHROCYTE [DISTWIDTH] IN BLOOD BY AUTOMATED COUNT: 18.7 % (ref 10–15)
GFR SERPL CREATININE-BSD FRML MDRD: 32 ML/MIN/{1.73_M2}
GLUCOSE SERPL-MCNC: 126 MG/DL (ref 70–99)
HCT VFR BLD AUTO: 24.5 % (ref 40–53)
HGB BLD-MCNC: 7.6 G/DL (ref 13.3–17.7)
MAGNESIUM SERPL-MCNC: 1.9 MG/DL (ref 1.6–2.3)
MCH RBC QN AUTO: 30.5 PG (ref 26.5–33)
MCHC RBC AUTO-ENTMCNC: 31 G/DL (ref 31.5–36.5)
MCV RBC AUTO: 98 FL (ref 78–100)
PHOSPHATE SERPL-MCNC: 2.9 MG/DL (ref 2.5–4.5)
PLATELET # BLD AUTO: 280 10E9/L (ref 150–450)
POTASSIUM SERPL-SCNC: 3.6 MMOL/L (ref 3.4–5.3)
RBC # BLD AUTO: 2.49 10E12/L (ref 4.4–5.9)
SODIUM SERPL-SCNC: 135 MMOL/L (ref 133–144)
TACROLIMUS BLD-MCNC: 7.1 UG/L (ref 5–15)
TME LAST DOSE: NORMAL H
WBC # BLD AUTO: 6.5 10E9/L (ref 4–11)

## 2019-03-31 PROCEDURE — 25000132 ZZH RX MED GY IP 250 OP 250 PS 637: Performed by: NURSE PRACTITIONER

## 2019-03-31 PROCEDURE — 85027 COMPLETE CBC AUTOMATED: CPT | Performed by: THORACIC SURGERY (CARDIOTHORACIC VASCULAR SURGERY)

## 2019-03-31 PROCEDURE — 40000558 ZZH STATISTIC CVC DRESSING CHANGE

## 2019-03-31 PROCEDURE — 97110 THERAPEUTIC EXERCISES: CPT | Mod: GO | Performed by: OCCUPATIONAL THERAPIST

## 2019-03-31 PROCEDURE — 25000131 ZZH RX MED GY IP 250 OP 636 PS 637: Performed by: NURSE PRACTITIONER

## 2019-03-31 PROCEDURE — 21400000 ZZH R&B CCU UMMC

## 2019-03-31 PROCEDURE — 25000132 ZZH RX MED GY IP 250 OP 250 PS 637: Performed by: PHYSICIAN ASSISTANT

## 2019-03-31 PROCEDURE — 80197 ASSAY OF TACROLIMUS: CPT | Performed by: THORACIC SURGERY (CARDIOTHORACIC VASCULAR SURGERY)

## 2019-03-31 PROCEDURE — 25000132 ZZH RX MED GY IP 250 OP 250 PS 637: Performed by: STUDENT IN AN ORGANIZED HEALTH CARE EDUCATION/TRAINING PROGRAM

## 2019-03-31 PROCEDURE — 97530 THERAPEUTIC ACTIVITIES: CPT | Mod: GO | Performed by: OCCUPATIONAL THERAPIST

## 2019-03-31 PROCEDURE — 25000128 H RX IP 250 OP 636: Performed by: STUDENT IN AN ORGANIZED HEALTH CARE EDUCATION/TRAINING PROGRAM

## 2019-03-31 PROCEDURE — 99232 SBSQ HOSP IP/OBS MODERATE 35: CPT | Performed by: INTERNAL MEDICINE

## 2019-03-31 PROCEDURE — 25000128 H RX IP 250 OP 636: Performed by: PHYSICIAN ASSISTANT

## 2019-03-31 PROCEDURE — 83735 ASSAY OF MAGNESIUM: CPT | Performed by: THORACIC SURGERY (CARDIOTHORACIC VASCULAR SURGERY)

## 2019-03-31 PROCEDURE — 84100 ASSAY OF PHOSPHORUS: CPT | Performed by: THORACIC SURGERY (CARDIOTHORACIC VASCULAR SURGERY)

## 2019-03-31 PROCEDURE — 36592 COLLECT BLOOD FROM PICC: CPT | Performed by: THORACIC SURGERY (CARDIOTHORACIC VASCULAR SURGERY)

## 2019-03-31 PROCEDURE — 25000125 ZZHC RX 250: Performed by: NURSE PRACTITIONER

## 2019-03-31 PROCEDURE — 80048 BASIC METABOLIC PNL TOTAL CA: CPT | Performed by: THORACIC SURGERY (CARDIOTHORACIC VASCULAR SURGERY)

## 2019-03-31 PROCEDURE — 97535 SELF CARE MNGMENT TRAINING: CPT | Mod: GO | Performed by: OCCUPATIONAL THERAPIST

## 2019-03-31 PROCEDURE — 25000128 H RX IP 250 OP 636: Performed by: NURSE PRACTITIONER

## 2019-03-31 PROCEDURE — 25000132 ZZH RX MED GY IP 250 OP 250 PS 637: Performed by: INTERNAL MEDICINE

## 2019-03-31 PROCEDURE — 25000131 ZZH RX MED GY IP 250 OP 636 PS 637: Performed by: HOSPITALIST

## 2019-03-31 PROCEDURE — 25000125 ZZHC RX 250: Performed by: INTERNAL MEDICINE

## 2019-03-31 RX ORDER — POTASSIUM CHLORIDE 750 MG/1
40 TABLET, EXTENDED RELEASE ORAL 3 TIMES DAILY
Status: DISCONTINUED | OUTPATIENT
Start: 2019-03-31 | End: 2019-04-03 | Stop reason: HOSPADM

## 2019-03-31 RX ORDER — BUMETANIDE 0.25 MG/ML
2 INJECTION INTRAMUSCULAR; INTRAVENOUS ONCE
Status: COMPLETED | OUTPATIENT
Start: 2019-03-31 | End: 2019-03-31

## 2019-03-31 RX ORDER — TACROLIMUS 1 MG/1
3 CAPSULE ORAL
Status: DISCONTINUED | OUTPATIENT
Start: 2019-03-31 | End: 2019-04-03 | Stop reason: HOSPADM

## 2019-03-31 RX ORDER — MAGNESIUM OXIDE 400 MG/1
400 TABLET ORAL DAILY
Status: DISCONTINUED | OUTPATIENT
Start: 2019-03-31 | End: 2019-04-01

## 2019-03-31 RX ADMIN — Medication 500000 UNITS: at 15:26

## 2019-03-31 RX ADMIN — POTASSIUM CHLORIDE 40 MEQ: 750 TABLET, EXTENDED RELEASE ORAL at 07:40

## 2019-03-31 RX ADMIN — Medication 5 ML: at 19:47

## 2019-03-31 RX ADMIN — Medication 1 TABLET: at 17:57

## 2019-03-31 RX ADMIN — Medication 1 CAPSULE: at 07:39

## 2019-03-31 RX ADMIN — Medication 500000 UNITS: at 19:47

## 2019-03-31 RX ADMIN — CALCIUM CARBONATE (ANTACID) CHEW TAB 500 MG 500 MG: 500 CHEW TAB at 07:37

## 2019-03-31 RX ADMIN — MELATONIN TAB 3 MG 6 MG: 3 TAB at 21:49

## 2019-03-31 RX ADMIN — PANTOPRAZOLE SODIUM 40 MG: 40 TABLET, DELAYED RELEASE ORAL at 19:48

## 2019-03-31 RX ADMIN — MYCOPHENOLATE MOFETIL 1500 MG: 500 TABLET, FILM COATED ORAL at 07:40

## 2019-03-31 RX ADMIN — BUMETANIDE 2 MG: 2 TABLET ORAL at 15:22

## 2019-03-31 RX ADMIN — TACROLIMUS 3 MG: 1 CAPSULE ORAL at 17:57

## 2019-03-31 RX ADMIN — LEVOTHYROXINE SODIUM 100 MCG: 100 TABLET ORAL at 07:39

## 2019-03-31 RX ADMIN — PREDNISONE 10 MG: 10 TABLET ORAL at 19:48

## 2019-03-31 RX ADMIN — POTASSIUM CHLORIDE 40 MEQ: 750 TABLET, EXTENDED RELEASE ORAL at 15:23

## 2019-03-31 RX ADMIN — BENZOCAINE, MENTHOL 1 LOZENGE: 15; 3.6 LOZENGE ORAL at 03:25

## 2019-03-31 RX ADMIN — BUMETANIDE 2 MG: 0.25 INJECTION INTRAMUSCULAR; INTRAVENOUS at 13:09

## 2019-03-31 RX ADMIN — POTASSIUM CHLORIDE 40 MEQ: 750 TABLET, EXTENDED RELEASE ORAL at 19:47

## 2019-03-31 RX ADMIN — TACROLIMUS 2.5 MG: 1 CAPSULE ORAL at 07:41

## 2019-03-31 RX ADMIN — MYCOPHENOLATE MOFETIL 1500 MG: 500 TABLET, FILM COATED ORAL at 17:57

## 2019-03-31 RX ADMIN — DULOXETINE 20 MG: 20 CAPSULE, DELAYED RELEASE ORAL at 07:39

## 2019-03-31 RX ADMIN — Medication 500000 UNITS: at 07:39

## 2019-03-31 RX ADMIN — MESALAMINE 2400 MG: 800 TABLET, DELAYED RELEASE ORAL at 19:47

## 2019-03-31 RX ADMIN — PANTOPRAZOLE SODIUM 40 MG: 40 TABLET, DELAYED RELEASE ORAL at 07:39

## 2019-03-31 RX ADMIN — FLUTICASONE PROPIONATE 2 PUFF: 220 AEROSOL, METERED RESPIRATORY (INHALATION) at 07:40

## 2019-03-31 RX ADMIN — MAGNESIUM OXIDE TAB 400 MG (241.3 MG ELEMENTAL MG) 400 MG: 400 (241.3 MG) TAB at 15:22

## 2019-03-31 RX ADMIN — FLUTICASONE PROPIONATE 2 PUFF: 220 AEROSOL, METERED RESPIRATORY (INHALATION) at 19:49

## 2019-03-31 RX ADMIN — SULFAMETHOXAZOLE AND TRIMETHOPRIM 1 TABLET: 800; 160 TABLET ORAL at 07:41

## 2019-03-31 RX ADMIN — MONTELUKAST SODIUM 10 MG: 10 TABLET, COATED ORAL at 19:47

## 2019-03-31 RX ADMIN — SENNOSIDES AND DOCUSATE SODIUM 1 TABLET: 8.6; 5 TABLET ORAL at 19:48

## 2019-03-31 RX ADMIN — Medication 1 TABLET: at 07:39

## 2019-03-31 RX ADMIN — PREDNISONE 10 MG: 10 TABLET ORAL at 07:41

## 2019-03-31 RX ADMIN — BUMETANIDE 2 MG: 2 TABLET ORAL at 07:39

## 2019-03-31 RX ADMIN — SENNOSIDES AND DOCUSATE SODIUM 1 TABLET: 8.6; 5 TABLET ORAL at 07:41

## 2019-03-31 RX ADMIN — ROSUVASTATIN CALCIUM 10 MG: 10 TABLET, FILM COATED ORAL at 07:39

## 2019-03-31 RX ADMIN — Medication 500000 UNITS: at 13:10

## 2019-03-31 RX ADMIN — MESALAMINE 2400 MG: 800 TABLET, DELAYED RELEASE ORAL at 07:48

## 2019-03-31 RX ADMIN — ALTEPLASE 2 MG: 2.2 INJECTION, POWDER, LYOPHILIZED, FOR SOLUTION INTRAVENOUS at 06:51

## 2019-03-31 ASSESSMENT — ACTIVITIES OF DAILY LIVING (ADL)
ADLS_ACUITY_SCORE: 10
ADLS_ACUITY_SCORE: 11
ADLS_ACUITY_SCORE: 11
ADLS_ACUITY_SCORE: 10
ADLS_ACUITY_SCORE: 11
ADLS_ACUITY_SCORE: 10

## 2019-03-31 ASSESSMENT — MIFFLIN-ST. JEOR: SCORE: 1374.67

## 2019-03-31 NOTE — PLAN OF CARE
D: S/p heart transplant on 2/24 c/b ICU delerium and FRANKLIN on temporary HD. Hx of NICM, ICD, PAF, UC, ETOH, hyperthyroidism, and depression.    I: Monitored vitals and assessed pt status.   Changed: R chest and L hand dressings changed - CDI  Running: R DL Midline - SL  PRN: n/a    A: A0x4. VSS on RA. Afebrile. SR. Urinating adequately, + BM x2 today. Denies pain. Good appetite - 2000 mL fluid restriction. Up with assist of 1-2 + walker and tolerating well. Ambulating in halls and to bathroom frequently.     P: Most likely discharge to  ARU tomorrow. Continue to monitor Pt status and report changes to treatment team - CVTS.

## 2019-03-31 NOTE — PROGRESS NOTES
CVTS Daily Note  3/31/2019  Attending: Dewayne House, *    S:   No overnight events.  Feeling a little better each day   Pt seen at bedside resting comfortably. No acute complaints.      Denies F/C/Sweats.  No CP, SOB, or calf pain.    Tolerating diet.  + BM.  + Flatus.    Ambulated well with less assistance.    Pain level tolerable. Plan as per Neuro section below.     O:   Vitals:    03/30/19 2259 03/31/19 0307 03/31/19 0700 03/31/19 1100   BP: 121/77  140/90 109/71   BP Location: Left arm  Left arm Left arm   Pulse:       Resp: 18  16 16   Temp: 98  F (36.7  C)  97.6  F (36.4  C) 97.8  F (36.6  C)   TempSrc: Oral  Oral Oral   SpO2: 98%  100% 100%   Weight:  57 kg (125 lb 11.2 oz)     Height:         Vitals:    03/29/19 0049 03/30/19 0217 03/31/19 0307   Weight: 57.2 kg (126 lb 3.2 oz) 58.1 kg (128 lb 1.6 oz) 57 kg (125 lb 11.2 oz)       Intake/Output Summary (Last 24 hours) at 3/31/2019 1203  Last data filed at 3/31/2019 1100  Gross per 24 hour   Intake 1250 ml   Output 1550 ml   Net -300 ml       MAPs: 82 - 109   Gen: AAO x 3, pleasant, NAD  CV: RRR, S1S2 normal, no murmurs, rubs, or gallops.   Pulm: CTA, no rhonchi or wheezes  Abd: soft, non-tender, no guarding  Ext: trace peripheral edema, 1 + pitting  Incision: clean, dry, intact, no erythema  Chest Tube sites: dressings clean and dry      Labs:  BMP  Recent Labs   Lab 03/31/19  0551 03/30/19  1928 03/30/19  0531 03/29/19  0430    135 134 134   POTASSIUM 3.6 3.4 3.4 3.6   CHLORIDE 104 100 102 102   RADAMES 7.8* 7.8* 7.8* 7.9*   CO2 17* 20 18* 18*   BUN 61* 63* 70* 68*   CR 2.19* 2.39* 2.25* 2.50*   * 105* 101* 94     CBC  Recent Labs   Lab 03/31/19  0551 03/30/19  0531 03/29/19  0430 03/28/19  0558   WBC 6.5 5.2 5.4 4.6   RBC 2.49* 2.56* 2.66* 2.65*   HGB 7.6* 7.7* 8.2* 8.1*   HCT 24.5* 25.0* 26.1* 25.9*   MCV 98 98 98 98   MCH 30.5 30.1 30.8 30.6   MCHC 31.0* 30.8* 31.4* 31.3*   RDW 18.7* 19.1* 19.6* 19.7*    260 264 255      INR  Recent Labs   Lab 03/27/19  0521 03/26/19  0531 03/25/19  0612   INR 1.39* 1.38* 1.41*      Hepatic Panel   Lab Results   Component Value Date    AST 16 03/26/2019     Lab Results   Component Value Date    ALT 23 03/26/2019     Lab Results   Component Value Date    ALBUMIN 2.4 03/26/2019     GLUCOSE:   Recent Labs   Lab 03/31/19  0551 03/30/19  1928 03/30/19  0531 03/29/19  0430 03/29/19  0211 03/28/19  2211 03/28/19  1732 03/28/19  1208 03/28/19  0752 03/28/19  0558 03/28/19  0134  03/27/19  0521   * 105* 101* 94  --   --   --   --   --  142*  --   --  130*   BGM  --   --   --   --  99 140* 209* 82 137*  --  185*   < >  --     < > = values in this interval not displayed.       Imaging:  reviewed recent imaging      A/P:  Everton Larios is a 53 year old male with PMH etoh abuse, hypothyroidism, intermittent asthma, ulcerative colitis, Depression, Chronic HFrEF, NICM admitted with cardiogenic shock requiring subclavian balloon pump. He is now s/p OHT 2/24/19 with Piyush House and Christopher.    - RHC and biopsy 3/4;   0R, no AMR  - RHC and biopsy 3/11; 0R, no AMR  - RHC and biopsy 3/18; 1R, no AMR   - RHC and biopsy 3/25;  0R, biopsy suggestive of AMR.   - RHC and biopsy 3/28;  0R, no AMR      Neuro:   - Neuro intact. ICU delirium improving. Head CT 3/9 without acute pathology.   - Hx Depression: pta cymbalta  - Sleep: melatonin   - Tylenol and oxycodone PRN for pain      CV:   - Hx of NICM, now S/P heart transplant 2/24. No arrhythmia, HD stable.  - ASA 81 mg, Crestor. Sildenafil 20 mg TID.   - Immunosuppression per cardiology   - RHC 3/25 Mild elevated filling pressures. Clinically stable off dobutamine.   - Removed lena drain to right pacer pocket 3/26.   - RHC 3/28/19: RA 9, PA 35/19 (25), PCWP 18, Jaden CO/CI 5.4/3.2  - Possible mild antibody medicated rejection on bx from 3/25, result from 3/28 was no rejection. Cardiology following and with adjust immunosuppression.      Pulm:   - Hx  intermittent asthma, pta Singulair.   - Pulm toilet, IS, activity and deep breathing   - Supplemental O2 PRN to keep sats > 92%. Wean off as tolerated.  - LLL consolidation, albuterol and mucomyst Nebs today x 2, assess response.   - Sputum culture showing Klebsiella- zosyn started 3/21, changed to bactrim with sensitivities, ID recommended Bactrim for 10 days.        ID:   - WBC 5.2, afebrile, no signs or symptoms of infection other than SOB. Completed 7 days cefepime/zosyn 3/16.   - Blood cultures 3/13: NGTD. C diff negative 3/17.    - Sputum culture positive 3/20, bactrim, ID consulted, continue bactrim for klebsiella pneumonia. Discuss duration with ID.       GI / FEN:  - Reg diet, bowel regimen. Calorie counts. Na 134  - Tube feeds were off 3/21 for nausea. New formula 3/22 pm. - removed NJ and stopped tube feeding 3/25, patient refusing TF since 3/20.   - Hx ulcerative colitis; pta mesalamine. Stool occult negative 3/20.  - Increased PPI to bid for GERD sx, reports having been diagnoses with Pierce's esophagus in the past.   - RUQ abdominal pain: RUQ US 3/26 showing mild gallbladder wall thickening with biliary sludge. No classic biliary symptoms, no fevers or leukocytosis. Biliary dyskinesia?      Renal / :   - Creatinine 2.19, improved UOP, on PO Bumex.   - Nephrology following for oliguria and T/Th/Sa hemodialysis. Unable to get tunneled dialysis catheter due to lena drain to old right pacer pocket. RIJ dialysis catheter no longer functional and dialysis unable to be performed 3/21. Patient is showing signs of renal recovery but still not enough to go completely without dialysis. RIJ dialysis catheter replaced on 3/22 and removed on 2/29.   - Off dialysis since 3/23, electrolytes stable. Nephrology believes he will no longer need dialysis. Bumex challenge 3 IV 3/26 with 1.1L UOP; 3/27, 875 of U/o. UA clean 3/26.  - Cardiology following for diuresis. Planning Bumex 2 BID with 40 mEq K TID      Heme /  Anticoagulation:  - Got 1 unit PRBC for Hgb 6.9 on 3/24, hgb 7.6, stable. No signs of bleeding.   - Will need coumadin eventually for SVC graft per Dr House. Will need to discuss with Cardiology, may prefer Apixaban due to frequent biopsies.    - No biopsies or central line access on left due to SVC graft.   - Holding Hep gtt since 3/18. Has R chest hematoma shouldn't interfere with RIGHT chest HD tunneled catheter placement. Chest CT 3/19 confirms R chest hematoma at old AICD site, s/p drainage in OR 3/21.      Endo:   - Hypothyroid: levothyroxine 100 mcg daily   - Sliding corrective scale qid      PPX:   - Protonix     Dispo:   - 6C since 3/16  - ARU likely Monday if bed available. No dialysis needed.        Discussed with CVTS Fellow   Staff surgeons have been informed of changes through both  verbal and written communication.      Arcadio Blackburn PA-C  Cardiothoracic Surgery  Pager [phone]    12:03 PM   March 31, 2019

## 2019-03-31 NOTE — CONSULTS
[facility]   Cardiology II Service / Advanced Heart Failure  Daily Progress Note  Date of Service: 3/31/2019      Patient: Everton Larios  MRN: [id number]  Admission Date: 2/17/2019  Hospital Day # 42    Assessment and Plan: Everton Larios is a 54 year old male with a past medical history of alcohol abuse, hypothyroidism, asthma, ulcerative colitis, depression, NICM with cardiogenic shock requiring IABP.  He is now s/p OHT on 2/24/19.  His post operative course was complicated by RV dysfunction and FRANKLIN requiring hemodialysis.  We were consulted by CVTS for immunosuppression management.     Today's Plan:  1. Increase Tacrolimus to 3 mg twice daily as tacro level was 7.1 (goal 10-12).   2. Give Bumex 2 mg IV bolus x 1, then continue Bumex 2 mg twice daily  3. Increase potassium to 40 meq three times daily.   4. Repeat BMP this evening    NICM  S/p OHT on 2/24/19  Serostatus:  CMV+, EBV+, HSV1+, HSV2 neg, VZV+;     Immunosuppression:  Did not received Simulect induction.   Cellcept:   1500 mg twice daily  Tacrolimus:  2.5 mg twice daily. Tacro goal: 10-12.  Tacro level today: 7.1.  Increase tacrolimus to 3 mg twice daily.  Continue checking levels every 48 hours.   Prednisone:  10 mg twice daily.    Prophylaxis:  CAV:  ASA 81 mg daily.  Rosuvastatin 10 mg daily   Thrush:  Nystatin 500,000 units four times daily.   GI:  Protonix mg 40 mg twice daily.   CMV D+/R+, EBV D+/R+, HSV1+, HSV2 neg, VZV+; Valcyte 450 mg twice a week. .  Weekly CMV PCR for now per ID through mid April.  Due 4/1  Osteoporosis prevention: Calcium/Vitamin D 600/400 mg twice daily  PCP prophylaxis: After 10 day course of treatment for pneumonia, decrease Bactrim to 400/80 mg daily on T, Thurs, Saturday  Received pentamidine given 3/3    Studies:  ECHO: 3/26/19:  Moderate concentric wall thickening consistent with left ventricular hypertrophy is present. Global and regional left ventricular function is normal with an EF of  "60-65%. Right ventricle is moderately dilated with hypertrophy and systolic dysfunction that is slightly worse than on 3/5/19. IVC is dilated and RA pressure estimated 15 mmHg.  RHC: 3/11/19:  RA 27/23/17, RV 40/15, PA 40/19/30, PCW 19, PA sat 52.5%, PCW sat 96.1%, Hgb 7.5 g/dL, Jaden CO 4.7, Jaden CI 2.8, TD CO 5.0, TD CI 2.9   PVR 2.4       BIOPSIES/HISTORY OF GRAFT REJECTION:     3/4:     0R, no AMR  3/11:   0R, no AMR  3/18    1R, no AMR  3/25:   0R, suggestive of AMR  3/28:   0R    Fluid Status: Hypervolemic, but improving.   HTN:  Controlled.  BP range today:  109-140/70-90  Other:  RV function mildly to moderately reduced per last echo.     Klebsiella pneumonia:  Chest xray done 3/22 with ground glass opacity in right lung 3/19.  S/p Cefepime/zosyn x 7 days (completed on 3/16), now on 10 day course of Bactrim per ID's recommendations.    -Continue Bactrim 10 day course.  Last day: due 4/5/19    FRANKLIN on CKD: Improving.  -Renal consulted and following.  Signed off. Needs nephrology appointment once outpatient.    -Creatinine today 2.19.  (previously 2.30).  Continue diuresis plan as outlined above.   -Continue to trend daily levels.   ================================================================    Interval History/ROS: Everton is feeling better today.  His breathing has improved and his feet are less swollen.  He was able to sleep more flat. +PND and cough. Hasn't walked today.  Appetite is improving    Last 24 hr care team notes reviewed.   ROS:  4 point ROS including Respiratory, CV, GI and , other than that noted in the HPI, is negative.     Medications: Reviewed in EPIC.     Physical Exam:   /77 (BP Location: Left arm)   Pulse 85   Temp 98  F (36.7  C) (Oral)   Resp 18   Ht 1.727 m (5' 8\")   Wt 57 kg (125 lb 11.2 oz)   SpO2 98%   BMI 19.11 kg/m    GENERAL: Alert and oriented times three. Comfortable.  No acute distress.  HEENT: EOM's conjugate. Mucous membranes moist and without lesions.  NECK: " Supple and without lymphadenopathy. JVD 11 cm.  No carotid bruits.  CV: Rhythm regular S1S2 present without murmur, rub, or gallop.   RESPIRATORY: Respirations regular, even, and unlabored. Rales left base. No accessory muscle use.  No rhonchi or wheezing.   GI: Soft and non distended with normoactive bowel sounds present in all quadrants. No tenderness, rebound, guarding. No hepatomegaly.   EXTREMITIES: Compression stockings on bilaterally.  2+ peripheral edema in feet. Feet are less taut and softer today.  2+ bilateral pedal pulses. No clubbing.  NEUROLOGIC: Alert and orientated x 3. CN II-XII grossly intact. No focal deficits.   MUSCULOSKELETAL: No joint swelling or tenderness. No acute deformities.  SKIN: No cyanosis. No rashes or lesions. Sternal incisions healing well.     Data:  CMP  Recent Labs   Lab 03/31/19  0551 03/30/19  1928 03/30/19  0531 03/29/19  0430  03/28/19  0558  03/26/19  0531    135 134 134  --  134   < > 131*   POTASSIUM 3.6 3.4 3.4 3.6   < > 3.3*   < > 3.1*   CHLORIDE 104 100 102 102  --  100   < > 98   CO2 17* 20 18* 18*  --  18*   < > 18*   ANIONGAP 13 15* 14 15*  --  15*   < > 15*   * 105* 101* 94  --  142*   < > 133*   BUN 61* 63* 70* 68*  --  76*   < > 74*   CR 2.19* 2.39* 2.25* 2.50*  --  2.55*   < > 2.61*   GFRESTIMATED 32* 29* 31* 28*  --  27*   < > 26*   GFRESTBLACK 38* 34* 36* 32*  --  31*   < > 30*   RADAMES 7.8* 7.8* 7.8* 7.9*  --  8.0*   < > 7.8*   MAG 1.9  --  1.7 1.9  --  2.2   < > 2.3   PHOS 2.9  --  2.8 3.2  --  3.6   < > 4.4   PROTTOTAL  --   --   --   --   --   --   --  5.0*   ALBUMIN  --   --   --   --   --   --   --  2.4*   BILITOTAL  --   --   --   --   --   --   --  0.5   ALKPHOS  --   --   --   --   --   --   --  110   AST  --   --   --   --   --   --   --  16   ALT  --   --   --   --   --   --   --  23    < > = values in this interval not displayed.     CBC  Recent Labs   Lab 03/31/19  0551 03/30/19  0531 03/29/19  0430 03/28/19  0558   WBC 6.5 5.2 5.4 4.6    RBC 2.49* 2.56* 2.66* 2.65*   HGB 7.6* 7.7* 8.2* 8.1*   HCT 24.5* 25.0* 26.1* 25.9*   MCV 98 98 98 98   MCH 30.5 30.1 30.8 30.6   MCHC 31.0* 30.8* 31.4* 31.3*   RDW 18.7* 19.1* 19.6* 19.7*    260 264 255     INR  Recent Labs   Lab 03/27/19  0521 03/26/19  0531 03/25/19  0612   INR 1.39* 1.38* 1.41*       Patient seen and discussed with Dr. Donis.        My Norton APRN, CNP  Cards II  Pager [phone]    3/31/2019        Late entry - pt seen and examined on 3/31/19    I have seen and examined the patient as part of a shared visit with My Norton NP.  I agree with the note above. I reviewed today's vital signs and medications. I have reviewed and discussed with the advanced practice provider their physical examination, assessment, and plan   Briefly, recent heart transplant patient  My key history/exam findings are: hemodynamically stable.  Evidence of volume overload on exam todayThe key management decisions made by me:  Enahnce diuresis, increase tacrolimus dosing given subtherapeutic level.    Corinna Donis MD  Section Head - Advanced Heart Failure, Transplantation and Mechanical Circulatory Support  Director - [facility]  Associate Professor of Medicine, [facility]

## 2019-03-31 NOTE — PLAN OF CARE
Late entry for 3/30    Discharge Planner OT   6C  Patient plan for discharge: rehab  Current status: Pt making good progress with ambulation, same distance although less and less fatigue each day.  When fatigued needs more assist with STS following sternal precautions.  Ax2 STS   Barriers to return to prior living situation: medical needs, weakness, new precautions, Mild cognitive impairment  Recommendations for discharge: ARU  Rationale for recommendations: Pt presents with new deficits including weakness, poor activity tolerance, new precautions and recent falls leading to decreased function and safety. Pt unable to safely navigate home environment or stairs. Needs to achieve  Mod IND with walker and  stairs to return home. Pt will benefit from intensive, interdisciplinary ARU to address these deficits and to provide caregiver training to facilitate a safe dc to home.             Entered by: Monique Marsh 03/31/2019 5:57 PM

## 2019-03-31 NOTE — PLAN OF CARE
"/77 (BP Location: Left arm)   Pulse 85   Temp 98  F (36.7  C) (Oral)   Resp 18   Ht 1.727 m (5' 8\")   Wt 57 kg (125 lb 11.2 oz)   SpO2 98%   BMI 19.11 kg/m      Alert and oriented x4. VSS. Sinus rhythm. Denies pain. Sats 90s on RA. On regular diet with 2L FR. Good appetite. BMx1. Voiding adequate amounts. Right midline Heparin locked. Sternal incision and old CT sites ELEONORA. Right chest dressing CDI. Up with assist of 1 with gait belt and walker. Pt slept between cares. Plan for discharge to ARU on Monday. Will continue to monitor and notify MDs accordingly.  "

## 2019-03-31 NOTE — PLAN OF CARE
Discharge Planner OT   6C  Patient plan for discharge: ARU  Current status: Pt progressing well, Mod I bed mobility within precautions, CGA STS from higher surface although Min A x2 from lower surface with fatigue.  Progressed hallway ambulation to 100ft with no breaks, sat with his brother for a visit ~30' and returned to room with no break needed.   Barriers to return to prior living situation: activity tolerance, precautions, mild cognitive deficit  Recommendations for discharge: ARU  Rationale for recommendations: Pt presents with new deficits including weakness, poor activity tolerance, new precautions and recent falls leading to decreased function and safety. Pt unable to safely navigate home environment or stairs. Needs to achieve  Mod IND with walker and  stairs to return home. Pt will benefit from intensive, interdisciplinary ARU to address these deficits and to provide caregiver training to facilitate a safe dc to home.          Entered by: Monique Marsh 03/31/2019 1:20 PM

## 2019-04-01 ENCOUNTER — APPOINTMENT (OUTPATIENT)
Dept: PHYSICAL THERAPY | Facility: CLINIC | Age: 54
DRG: 001 | End: 2019-04-01
Attending: INTERNAL MEDICINE
Payer: COMMERCIAL

## 2019-04-01 LAB
ALBUMIN SERPL-MCNC: 2.8 G/DL (ref 3.4–5)
ALP SERPL-CCNC: 135 U/L (ref 40–150)
ALT SERPL W P-5'-P-CCNC: 28 U/L (ref 0–70)
ANION GAP SERPL CALCULATED.3IONS-SCNC: 12 MMOL/L (ref 3–14)
AST SERPL W P-5'-P-CCNC: 14 U/L (ref 0–45)
BILIRUB DIRECT SERPL-MCNC: 0.4 MG/DL (ref 0–0.2)
BILIRUB SERPL-MCNC: 0.6 MG/DL (ref 0.2–1.3)
BUN SERPL-MCNC: 53 MG/DL (ref 7–30)
CALCIUM SERPL-MCNC: 8.2 MG/DL (ref 8.5–10.1)
CHLORIDE SERPL-SCNC: 104 MMOL/L (ref 94–109)
CMV DNA SPEC NAA+PROBE-ACNC: NORMAL [IU]/ML
CMV DNA SPEC NAA+PROBE-LOG#: NORMAL {LOG_IU}/ML
CO2 SERPL-SCNC: 19 MMOL/L (ref 20–32)
CREAT SERPL-MCNC: 2.11 MG/DL (ref 0.66–1.25)
ERYTHROCYTE [DISTWIDTH] IN BLOOD BY AUTOMATED COUNT: 18.9 % (ref 10–15)
GFR SERPL CREATININE-BSD FRML MDRD: 34 ML/MIN/{1.73_M2}
GLUCOSE SERPL-MCNC: 86 MG/DL (ref 70–99)
HCT VFR BLD AUTO: 28.1 % (ref 40–53)
HGB BLD-MCNC: 8.4 G/DL (ref 13.3–17.7)
MAGNESIUM SERPL-MCNC: 1.8 MG/DL (ref 1.6–2.3)
MCH RBC QN AUTO: 29.9 PG (ref 26.5–33)
MCHC RBC AUTO-ENTMCNC: 29.9 G/DL (ref 31.5–36.5)
MCV RBC AUTO: 100 FL (ref 78–100)
PHOSPHATE SERPL-MCNC: 2.5 MG/DL (ref 2.5–4.5)
PLATELET # BLD AUTO: 299 10E9/L (ref 150–450)
POTASSIUM SERPL-SCNC: 4.6 MMOL/L (ref 3.4–5.3)
PROT SERPL-MCNC: 5.6 G/DL (ref 6.8–8.8)
RBC # BLD AUTO: 2.81 10E12/L (ref 4.4–5.9)
SODIUM SERPL-SCNC: 134 MMOL/L (ref 133–144)
SPECIMEN SOURCE: NORMAL
WBC # BLD AUTO: 7.9 10E9/L (ref 4–11)

## 2019-04-01 PROCEDURE — 83735 ASSAY OF MAGNESIUM: CPT | Performed by: THORACIC SURGERY (CARDIOTHORACIC VASCULAR SURGERY)

## 2019-04-01 PROCEDURE — 25000132 ZZH RX MED GY IP 250 OP 250 PS 637: Performed by: STUDENT IN AN ORGANIZED HEALTH CARE EDUCATION/TRAINING PROGRAM

## 2019-04-01 PROCEDURE — 99232 SBSQ HOSP IP/OBS MODERATE 35: CPT | Performed by: NURSE PRACTITIONER

## 2019-04-01 PROCEDURE — 25000132 ZZH RX MED GY IP 250 OP 250 PS 637: Performed by: NURSE PRACTITIONER

## 2019-04-01 PROCEDURE — 97110 THERAPEUTIC EXERCISES: CPT | Mod: GP

## 2019-04-01 PROCEDURE — 36415 COLL VENOUS BLD VENIPUNCTURE: CPT | Performed by: THORACIC SURGERY (CARDIOTHORACIC VASCULAR SURGERY)

## 2019-04-01 PROCEDURE — 85027 COMPLETE CBC AUTOMATED: CPT | Performed by: THORACIC SURGERY (CARDIOTHORACIC VASCULAR SURGERY)

## 2019-04-01 PROCEDURE — 25000131 ZZH RX MED GY IP 250 OP 636 PS 637: Performed by: NURSE PRACTITIONER

## 2019-04-01 PROCEDURE — 25000125 ZZHC RX 250: Performed by: INTERNAL MEDICINE

## 2019-04-01 PROCEDURE — 80076 HEPATIC FUNCTION PANEL: CPT | Performed by: THORACIC SURGERY (CARDIOTHORACIC VASCULAR SURGERY)

## 2019-04-01 PROCEDURE — 25000132 ZZH RX MED GY IP 250 OP 250 PS 637: Performed by: PHYSICIAN ASSISTANT

## 2019-04-01 PROCEDURE — 25000128 H RX IP 250 OP 636: Performed by: PHYSICIAN ASSISTANT

## 2019-04-01 PROCEDURE — 97116 GAIT TRAINING THERAPY: CPT | Mod: GP

## 2019-04-01 PROCEDURE — 21400000 ZZH R&B CCU UMMC

## 2019-04-01 PROCEDURE — 97530 THERAPEUTIC ACTIVITIES: CPT | Mod: GP

## 2019-04-01 PROCEDURE — 80048 BASIC METABOLIC PNL TOTAL CA: CPT | Performed by: THORACIC SURGERY (CARDIOTHORACIC VASCULAR SURGERY)

## 2019-04-01 PROCEDURE — G0463 HOSPITAL OUTPT CLINIC VISIT: HCPCS

## 2019-04-01 PROCEDURE — 84100 ASSAY OF PHOSPHORUS: CPT | Performed by: THORACIC SURGERY (CARDIOTHORACIC VASCULAR SURGERY)

## 2019-04-01 PROCEDURE — 25000125 ZZHC RX 250: Performed by: NURSE PRACTITIONER

## 2019-04-01 PROCEDURE — 25000125 ZZHC RX 250: Performed by: ANESTHESIOLOGY

## 2019-04-01 PROCEDURE — 25000131 ZZH RX MED GY IP 250 OP 636 PS 637: Performed by: HOSPITALIST

## 2019-04-01 PROCEDURE — 25000132 ZZH RX MED GY IP 250 OP 250 PS 637: Performed by: INTERNAL MEDICINE

## 2019-04-01 PROCEDURE — 25800030 ZZH RX IP 258 OP 636: Performed by: ANESTHESIOLOGY

## 2019-04-01 RX ORDER — BUMETANIDE 2 MG/1
2 TABLET ORAL ONCE
Status: COMPLETED | OUTPATIENT
Start: 2019-04-01 | End: 2019-04-01

## 2019-04-01 RX ORDER — POLYETHYLENE GLYCOL 3350 17 G/17G
17 POWDER, FOR SOLUTION ORAL DAILY PRN
DISCHARGE
Start: 2019-04-01 | End: 2019-04-29

## 2019-04-01 RX ORDER — MYCOPHENOLATE MOFETIL 250 MG/1
1500 CAPSULE ORAL 2 TIMES DAILY
Status: ON HOLD | DISCHARGE
Start: 2019-04-01 | End: 2019-04-12

## 2019-04-01 RX ORDER — VALGANCICLOVIR 450 MG/1
450 TABLET, FILM COATED ORAL
DISCHARGE
Start: 2019-04-01 | End: 2019-04-02

## 2019-04-01 RX ORDER — MAGNESIUM OXIDE 400 MG/1
400 TABLET ORAL 2 TIMES DAILY
Status: DISCONTINUED | OUTPATIENT
Start: 2019-04-01 | End: 2019-04-03 | Stop reason: HOSPADM

## 2019-04-01 RX ORDER — OMEPRAZOLE 40 MG/1
40 CAPSULE, DELAYED RELEASE ORAL
DISCHARGE
Start: 2019-04-01 | End: 2019-04-26

## 2019-04-01 RX ORDER — CALCIUM CARBONATE 500 MG/1
1 TABLET, CHEWABLE ORAL DAILY PRN
DISCHARGE
Start: 2019-04-01 | End: 2019-06-06

## 2019-04-01 RX ORDER — BUMETANIDE 2 MG/1
4 TABLET ORAL
Status: DISCONTINUED | OUTPATIENT
Start: 2019-04-01 | End: 2019-04-03 | Stop reason: HOSPADM

## 2019-04-01 RX ORDER — AMOXICILLIN 250 MG
1 CAPSULE ORAL 2 TIMES DAILY
DISCHARGE
Start: 2019-04-01 | End: 2019-04-29 | Stop reason: ALTCHOICE

## 2019-04-01 RX ORDER — MULTIPLE VITAMINS W/ MINERALS TAB 9MG-400MCG
1 TAB ORAL DAILY
Status: DISCONTINUED | OUTPATIENT
Start: 2019-04-02 | End: 2019-04-03 | Stop reason: HOSPADM

## 2019-04-01 RX ORDER — ALUMINA, MAGNESIA, AND SIMETHICONE 2400; 2400; 240 MG/30ML; MG/30ML; MG/30ML
30 SUSPENSION ORAL EVERY 4 HOURS PRN
Status: ON HOLD | DISCHARGE
Start: 2019-04-01 | End: 2019-04-11

## 2019-04-01 RX ORDER — ACETAMINOPHEN 325 MG/1
650 TABLET ORAL EVERY 4 HOURS PRN
DISCHARGE
Start: 2019-04-01

## 2019-04-01 RX ORDER — ASPIRIN 81 MG/1
81 TABLET, CHEWABLE ORAL DAILY
Status: DISCONTINUED | OUTPATIENT
Start: 2019-04-01 | End: 2019-04-03 | Stop reason: HOSPADM

## 2019-04-01 RX ORDER — VALGANCICLOVIR 450 MG/1
450 TABLET, FILM COATED ORAL EVERY OTHER DAY
Status: DISCONTINUED | OUTPATIENT
Start: 2019-04-02 | End: 2019-04-03 | Stop reason: HOSPADM

## 2019-04-01 RX ORDER — ROSUVASTATIN CALCIUM 10 MG/1
10 TABLET, COATED ORAL DAILY
Status: ON HOLD | DISCHARGE
Start: 2019-04-02 | End: 2019-04-11

## 2019-04-01 RX ORDER — SULFAMETHOXAZOLE AND TRIMETHOPRIM 400; 80 MG/1; MG/1
1 TABLET ORAL DAILY
Status: DISCONTINUED | OUTPATIENT
Start: 2019-04-05 | End: 2019-04-03 | Stop reason: HOSPADM

## 2019-04-01 RX ORDER — NYSTATIN 100000/ML
500000 SUSPENSION, ORAL (FINAL DOSE FORM) ORAL 4 TIMES DAILY
Status: ON HOLD | DISCHARGE
Start: 2019-04-01 | End: 2019-04-11

## 2019-04-01 RX ORDER — MAGNESIUM OXIDE 400 MG/1
400 TABLET ORAL DAILY
DISCHARGE
Start: 2019-04-01 | End: 2019-04-16

## 2019-04-01 RX ORDER — AZELASTINE 1 MG/ML
2 SPRAY, METERED NASAL 2 TIMES DAILY PRN
DISCHARGE
Start: 2019-04-01 | End: 2019-04-29

## 2019-04-01 RX ORDER — LANOLIN ALCOHOL/MO/W.PET/CERES
6 CREAM (GRAM) TOPICAL AT BEDTIME
DISCHARGE
Start: 2019-04-01 | End: 2024-09-20

## 2019-04-01 RX ADMIN — Medication 5 ML: at 20:38

## 2019-04-01 RX ADMIN — Medication 500000 UNITS: at 12:22

## 2019-04-01 RX ADMIN — ACETAMINOPHEN 650 MG: 325 TABLET, FILM COATED ORAL at 20:44

## 2019-04-01 RX ADMIN — Medication 500000 UNITS: at 07:44

## 2019-04-01 RX ADMIN — SULFAMETHOXAZOLE AND TRIMETHOPRIM 1 TABLET: 800; 160 TABLET ORAL at 07:41

## 2019-04-01 RX ADMIN — PREDNISONE 10 MG: 10 TABLET ORAL at 20:39

## 2019-04-01 RX ADMIN — BUMETANIDE 4 MG: 2 TABLET ORAL at 16:35

## 2019-04-01 RX ADMIN — TACROLIMUS 3 MG: 1 CAPSULE ORAL at 07:44

## 2019-04-01 RX ADMIN — FLUTICASONE PROPIONATE 2 PUFF: 220 AEROSOL, METERED RESPIRATORY (INHALATION) at 07:49

## 2019-04-01 RX ADMIN — MAGNESIUM OXIDE TAB 400 MG (241.3 MG ELEMENTAL MG) 400 MG: 400 (241.3 MG) TAB at 10:24

## 2019-04-01 RX ADMIN — BUMETANIDE 2 MG: 2 TABLET ORAL at 07:42

## 2019-04-01 RX ADMIN — Medication 500000 UNITS: at 16:35

## 2019-04-01 RX ADMIN — DULOXETINE 20 MG: 20 CAPSULE, DELAYED RELEASE ORAL at 07:44

## 2019-04-01 RX ADMIN — LEVOTHYROXINE SODIUM 100 MCG: 100 TABLET ORAL at 07:43

## 2019-04-01 RX ADMIN — FLUTICASONE PROPIONATE 2 PUFF: 220 AEROSOL, METERED RESPIRATORY (INHALATION) at 20:47

## 2019-04-01 RX ADMIN — Medication 5 ML: at 14:16

## 2019-04-01 RX ADMIN — BENZOCAINE, MENTHOL 1 LOZENGE: 15; 3.6 LOZENGE ORAL at 01:52

## 2019-04-01 RX ADMIN — Medication 1 CAPSULE: at 07:41

## 2019-04-01 RX ADMIN — ALTEPLASE 1 MG: 2.2 INJECTION, POWDER, LYOPHILIZED, FOR SOLUTION INTRAVENOUS at 11:09

## 2019-04-01 RX ADMIN — POTASSIUM CHLORIDE 40 MEQ: 750 TABLET, EXTENDED RELEASE ORAL at 20:39

## 2019-04-01 RX ADMIN — TACROLIMUS 3 MG: 1 CAPSULE ORAL at 17:55

## 2019-04-01 RX ADMIN — PANTOPRAZOLE SODIUM 40 MG: 40 TABLET, DELAYED RELEASE ORAL at 07:41

## 2019-04-01 RX ADMIN — Medication 1 TABLET: at 17:55

## 2019-04-01 RX ADMIN — Medication 500000 UNITS: at 20:47

## 2019-04-01 RX ADMIN — MYCOPHENOLATE MOFETIL 1500 MG: 500 TABLET, FILM COATED ORAL at 07:42

## 2019-04-01 RX ADMIN — ROSUVASTATIN CALCIUM 10 MG: 10 TABLET, FILM COATED ORAL at 07:44

## 2019-04-01 RX ADMIN — BUMETANIDE 2 MG: 2 TABLET ORAL at 12:21

## 2019-04-01 RX ADMIN — MESALAMINE 2400 MG: 800 TABLET, DELAYED RELEASE ORAL at 07:44

## 2019-04-01 RX ADMIN — Medication 1 TABLET: at 07:43

## 2019-04-01 RX ADMIN — MELATONIN TAB 3 MG 3 MG: 3 TAB at 01:52

## 2019-04-01 RX ADMIN — SENNOSIDES AND DOCUSATE SODIUM 1 TABLET: 8.6; 5 TABLET ORAL at 20:40

## 2019-04-01 RX ADMIN — ACETAMINOPHEN 650 MG: 325 TABLET, FILM COATED ORAL at 01:52

## 2019-04-01 RX ADMIN — POTASSIUM CHLORIDE 40 MEQ: 750 TABLET, EXTENDED RELEASE ORAL at 07:43

## 2019-04-01 RX ADMIN — MELATONIN TAB 3 MG 9 MG: 3 TAB at 20:39

## 2019-04-01 RX ADMIN — MYCOPHENOLATE MOFETIL 1500 MG: 500 TABLET, FILM COATED ORAL at 17:55

## 2019-04-01 RX ADMIN — ACETAMINOPHEN 650 MG: 325 TABLET, FILM COATED ORAL at 08:07

## 2019-04-01 RX ADMIN — ASPIRIN 81 MG CHEWABLE TABLET 81 MG: 81 TABLET CHEWABLE at 12:20

## 2019-04-01 RX ADMIN — Medication 5 ML: at 12:22

## 2019-04-01 RX ADMIN — SODIUM PHOSPHATE, MONOBASIC, MONOHYDRATE AND SODIUM PHOSPHATE, DIBASIC, ANHYDROUS 10 MMOL: 276; 142 INJECTION, SOLUTION INTRAVENOUS at 10:23

## 2019-04-01 RX ADMIN — PREDNISONE 10 MG: 10 TABLET ORAL at 07:42

## 2019-04-01 RX ADMIN — MESALAMINE 2400 MG: 800 TABLET, DELAYED RELEASE ORAL at 20:39

## 2019-04-01 RX ADMIN — MONTELUKAST SODIUM 10 MG: 10 TABLET, COATED ORAL at 20:40

## 2019-04-01 RX ADMIN — POTASSIUM CHLORIDE 40 MEQ: 750 TABLET, EXTENDED RELEASE ORAL at 13:22

## 2019-04-01 RX ADMIN — MAGNESIUM OXIDE TAB 400 MG (241.3 MG ELEMENTAL MG) 400 MG: 400 (241.3 MG) TAB at 20:39

## 2019-04-01 RX ADMIN — PANTOPRAZOLE SODIUM 40 MG: 40 TABLET, DELAYED RELEASE ORAL at 20:44

## 2019-04-01 ASSESSMENT — ACTIVITIES OF DAILY LIVING (ADL)
ADLS_ACUITY_SCORE: 13
ADLS_ACUITY_SCORE: 13
ADLS_ACUITY_SCORE: 12
ADLS_ACUITY_SCORE: 13
ADLS_ACUITY_SCORE: 12
ADLS_ACUITY_SCORE: 12

## 2019-04-01 ASSESSMENT — MIFFLIN-ST. JEOR: SCORE: 1364.69

## 2019-04-01 ASSESSMENT — PAIN DESCRIPTION - DESCRIPTORS: DESCRIPTORS: ACHING

## 2019-04-01 NOTE — DISCHARGE SUMMARY
[facility], [place]   Cardiothoracic Surgery Hospital Discharge Summary     Everton Larios MRN# [id number]   Age: 54 year old YOB: 1965     Admitting Physician:  Dweayne House MD  Discharge Physician:  JOSE Song  Primary Care Physician:        Fredrick Wolff     DATE OF ADMISSION: 2/17/2019     DATE OF DISCHARGE: April 3, 2019          Primary Diagnoses:   1. NICM with chronic decompensated systolic heart failure with cardiogenic shock (NYHA class IV, stage D), now s/p orthotopic heart transplant    2. S/p bypass of persistent left superior vena cava to right atrial appendage   3. Acute kidney injury on chronic renal insufficiency; resolving, now off dialysis since 3/23  4. S/p washout and removal of hematoma at prior AICD site           Secondary Diagnoses:   1. Ulcerative Colitis   2. Hx Pierce's esophagus   3. Intermittent asthma   4. Depression   5. Hx iron deficiency anemia   6. Hypothyroidism     PROCEDURES PERFORMED:   Date: 2/19/2019.  Surgeon: Dr. Dewayne House   1) Left subclavian chimney graft  2) Placement of left subclavian intra-aortic balloon pump  3) Fluoroscopy with interpretation  4) Removal of right femoral intra-aortic balloon pump    Date: 2/19/2019.  Surgeon: Dr. Ben White   1. Replacement of a subclavian intra-aortic balloon pump due to thrombus     Date: 2/24/2019.  Surgeon: Dr. Dewayne House and Dr. Ben White   1) Redo median sternotomy  2) Recipient cardiectomy  3) Orthotopic heart transplant  4) Transesophageal echocardiography  5) Bypass of persistent left superior vena cava to right atrial appendage with 14mm Goretex graft  6) Explant of automated implanted cardiac defibrillator (AICD)  7) Explant of left subclavian intra-aortic balloon pump (IABP)    Date: 3/21/2019.  Surgeon: Dr. Dewayne House   1) Evacuation of chest wall hematoma  2) Placement of drain    INTRAOPERATIVE COMPLICATIONS:  None      PATHOLOGY RESULTS:    - RHC and biopsy 3/4;   0R, no AMR  - RHC and biopsy 3/11; 0R, no AMR  - RHC and biopsy 3/18; 1R, no AMR   - RHC and biopsy 3/25;  0R, biopsy suggestive of AMR.   - RHC and biopsy 3/28;  0R, no AMR     CULTURE RESULTS:     - only positive culture was sputum on 3/20/19, showing light growth Klebsiella oxytoca and treated with Bactrim    DRAINS/TUBES PRESENT AT DISCHARGE:  none    CONSULTS:    1.  PT/OT  2.  Cardiology   3.  Infectious Disease   4.  Hematology/Oncology   5.  Interventional Radiology   6.  Neurology   7.  Nephrology     BRIEF HISTORY OF ILLNESS:  Mr. Larios is a 52 yo M admitted 2/17/19 to Cardiology service with heart failure secondary to non-ischemic cardiomyopathy.  He had previously been listed for heart transplant, but required assistance of intra-aortic balloon placement for hemodynamic support.  A suitable donor heart later became available.  The patient and his family were counseled about heart transplant.  Risks and benefits of the operation were explained to the patient and their family including, but not limited to, bleeding, infection, stroke and even death.  They understood these risks and agreed to proceed electively.    HOSPITAL COURSE: Everton Larios is a 54 year old male who underwent the above-named procedures.  He tolerated the operations well and remained admitted to the CVICU to the CV Surgery and Cardiology HF services.  He was extubated on POD # 1 to 4 lpm via NC with neuro status intact but suffering from some delirium and encephalopathy of critical illness. He was also incidentally found to have a clinically insignificant small pneumoperitoneum on CXR, CT Abd showed no acute pathology.  He was weaned off pressors by 2/29 and immunosuppression was initiated per protocol. He was also started on Hep gtt for protection of his SVC graft with plans to start life-long coumadin. His soft CO lead to a dobutamine gtt and PO sildenafil for RV support off of  pressor support on 3/1. He initially tolerated diuresis with bumex gtt while in the ICU, but developed oliguric FRANKLIN and was held 3/3 for rising creatinine and hyponatremia. Nephrology was consulted 3/6 and he started CRRT on 3/8 and transitioned to HD on 3/14. On 3/9, he was worked up for suspicion of septic shock and started on Vanco (3/9 - 3/11) and zosyn (3/9 - 3/14).  He also developed a hematoma at the site of his prior AICD on his right chest, so heparin gtt was stopped without ever starting coumadin (due to weekly heart biopsies).  Nutritional needs were supported with NJ tube feedings.   He was transferred to the post-surgical telemetry unit on 3/16/19 stable on HD and with delirium improving.     Neuro:   ICU delirium - neuro intact, Head CT 3/9 without acute pathology, resolved.   History of Depression: pta cymbalta     CV:   - NICM, now S/P heart transplant 2/24. No arrhythmia, HD stable. ASA 81 mg, Crestor. Sildenafil weaned off 3/29, RV okay on echo and RHC. Immunosuppression to be managed by cardiology. Biopsy from 3/25 showed possible mild antibody medicated rejection, repeat biopsy on 3/28 showed no rejection.     Pulm:   - HCAP - LLL consolidation, sputum culture showed klebsiella treated with zosyn started 3/21, changed to bactrim with sensitivities, ID recommended Bactrim for 10 days with an end date of 4/5.       ID:   - WBC WNL, afebrile, no signs or symptoms of infection other than SOB. Completed 7 days cefepime/zosyn 3/16.   - Blood cultures 3/13: NGTD. C diff negative 3/17.    - Sputum culture positive 3/20, bactrim, ID consulted, continue bactrim until 4/5 for klebsiella pneumonia, then resume prophylactic dosing twice weekly per Cardiology team.      GI / FEN:  - Severe malnutrition due to low calorie intake - received TF and supplements, removed NJ and stopped tube feeding 3/25   - History of ulcerative colitis - on pta mesalamine. Stool occult negative 3/20.  - RUQ abdominal pain: RUQ US  "3/26 showing mild gallbladder wall thickening with biliary sludge. No classic biliary symptoms, no fevers or leukocytosis. Biliary dyskinesia. This has resolved. PPI BID for GERD and history of Pierce's esophagus in the past.      Renal / :   - Acute kidney injury - likely ATN from surgery. Required iHD, but had renal recovery.   RIJ dialysis catheter replaced on 3/22 and then removed on 3/29 after being off of dialysis since 3/23, electrolytes stable, creatinine continues to improve. Nephrology believes he will no longer need dialysis. Cardiology following for diuresis. Discharging on Bumex 4 BID with replacement K.      Heme / Anticoagulation:  - Right chest wall hematoma at AICD site - s/p drainage in OR on 3/21. Anticoagulation held given recent hematoma. Started on ASA. Discussed with Dr. House, no plans for anticoagulation given bleeding risk. Graft appears to be patent, monitor for increased swelling in left upper extremity.     Prior to discharge, his pain was controlled well, he was able to perform most ADLs and ambulate without difficulty, and had full return of bowel and bladder function.  On April 3, 2019, he was discharged to [facility] in stable condition.         Discharge Disposition:   Discharged to rehabilitation facility            Condition on Discharge:   Discharge condition: Stable   Discharge vitals: Blood pressure (!) 113/93, pulse 91, temperature 98  F (36.7  C), temperature source Oral, resp. rate 18, height 1.727 m (5' 8\"), weight 56 kg (123 lb 8 oz), SpO2 100 %.     Code status on discharge: Full Code     Vitals:    03/30/19 0217 03/31/19 0307 04/01/19 0040   Weight: 58.1 kg (128 lb 1.6 oz) 57 kg (125 lb 11.2 oz) 56 kg (123 lb 8 oz)       DAY OF DISCHARGE PHYSICAL EXAM:    Gen: A&Ox3, NAD, conversational  CV: RRR, S1S2 normal, no murmurs, rubs, or gallops  Pulm: Lungs CTA, no rhonchi or wheezes  Abd: +BS, Soft, nondistended, NTTP  Ext: Trace dependent edema, + pitting  Incision: " Clean, dry, intact, no erythema  Chest Tube sites: Dressings clean and dry  Lines: Drive line dressing clean and dry    BMP  Recent Labs   Lab 04/02/19  0514 04/01/19  0653 03/31/19  0551 03/30/19 1928    134 135 135   POTASSIUM 4.1 4.6 3.6 3.4   CHLORIDE 104 104 104 100   RADAMES 7.7* 8.2* 7.8* 7.8*   CO2 21 19* 17* 20   BUN 50* 53* 61* 63*   CR 1.85* 2.11* 2.19* 2.39*   GLC 96 86 126* 105*     CBC  Recent Labs   Lab 04/01/19  0653 03/31/19  0551 03/30/19  0531 03/29/19  0430   WBC 7.9 6.5 5.2 5.4   RBC 2.81* 2.49* 2.56* 2.66*   HGB 8.4* 7.6* 7.7* 8.2*   HCT 28.1* 24.5* 25.0* 26.1*    98 98 98   MCH 29.9 30.5 30.1 30.8   MCHC 29.9* 31.0* 30.8* 31.4*   RDW 18.9* 18.7* 19.1* 19.6*    280 260 264     INR  Recent Labs   Lab 03/27/19  0521 03/26/19  0531   INR 1.39* 1.38*      Hepatic Panel   Lab Results   Component Value Date    AST 14 04/01/2019     Lab Results   Component Value Date    ALT 28 04/01/2019     Lab Results   Component Value Date    ALBUMIN 2.8 04/01/2019     Recent Labs   Lab 04/01/19  0653 03/31/19  0551 03/30/19 1928 03/30/19  0531 03/29/19  0430 03/29/19  0211 03/28/19  2211 03/28/19  1732 03/28/19  1208 03/28/19  0752 03/28/19  0558 03/28/19  0134   GLC 86 126* 105* 101* 94  --   --   --   --   --  142*  --    BGM  --   --   --   --   --  99 140* 209* 82 137*  --  185*       ECHOCARDIOGRAM, 3/26/2019-   Moderate concentric wall thickening consistent with left ventricular  hypertrophy is present.  Global and regional left ventricular function is normal with an EF of 60-65%.  Right ventricle is moderately dilated with hypertrophy and systolic  dysfunction that is slightly worse than on 3/5/19.  IVC is dilated and RA pressure estimated 15 mmHg    CXR 3/22/19-   1. New right IJ central venous catheter tip projects over the superior  cavoatrial junction. No pneumothorax. Remainder of the support devices  are stable, as described above.  2. Right basilar patchy consolidation and small  pleural effusion, may  represent aspiration versus infection in the appropriate clinical  context.  3. Left basilar and retrocardiac opacities, favored to represent  atelectasis.     US Abdominal limited 3/26/2019:  1. Mild gallbladder wall thickening and biliary sludge versus tiny  nonshadowing stones. Findings are indeterminate and may be related to  systemic disease versus cholecystitis. Nuclear medicine HIDA scan  could be considered for further characterization if clinically  indicated.   2. Small ascites and small right pleural effusion.  3.Subcentimeter cyst and echogenic lesion of the inferior right  hepatic lobe, unchanged from previous and likely representing  hemangioma.    DISCHARGE INSTRUCTIONS:  You had a sternotomy, avoid lifting anything greater than ten pounds for 6 weeks after surgery and then less than 20 pounds for an additional 6 weeks.    No driving for 4 weeks after surgery or while on pain medication.     Avoid strenuous activities such as bowling, vacuuming, raking, shoveling, golf or tennis for 12 weeks after your surgery. It is okay to resume sex if you feel comfortable in doing so. You may have to try different positions with your partner.     Splint your chest incision by hugging a pillow or bringing your arms across your chest when coughing or sneezing. Avoid pushing off with your arms when getting up for the first month if you have had your sternum opened.    Shower or wash your incisions daily with soap and water (or as instructed), pat dry. Keep wound clean and dry, showers are okay after discharge, but don't let spray hit directly on incision. No baths or swimming for 1 month. Cover chest tube sites with gauze until they stop draining, then leave open to air. It is not abnormal for chest tube sites to drain yellowish/clear fluid for up to 2-3 weeks after surgery.     Watch for signs of infection: increased redness, tenderness, warmth or any drainage that appears infected (pus like)  or is persistent.  Also a temperature > 100.5 F or chills. Call your surgeon or primary care provider's office immediately. Remove any skin glue left on incisions after 10-14 days. This will not affect your incision and can speed up healing.    Exercise is very important in your recovery. Please follow the guidelines set up for you in your cardiac rehab classes at the hospital. If outpatient cardiac rehab was ordered for you, we highly recommend you participate. If you have problems arranging your cardiac rehab, please call [phone].     Avoid sitting for prolonged periods of time, try to walk every hour during the day. If you have a leg incision, elevate your leg often when you are not walking.    Check your weight when you get home from the hospital and continue to check it daily through your recovery for at least a month. If you notice a weight gain of 2-3 pounds in a week, notify your primary care physician, cardiologist or surgeon.    Bowel activity may be slow after surgery. If necessary, you may take an over the counter laxative such as Milk of Magnesia or Miralax. You may have stool softeners prescribed (docusate sodium, Senokot). We recommend using stool softeners while using narcotics for pain (oxycodone/percocet, hydrocodone/vicodin, hydromorphone/dilaudid).      Wean OFF of narcotics (oxycodone, dilaudid, hydrocodone) as soon as possible. You should continue taking acetaminophen as long as you have any surgical pain as the first choice for pain control and add narcotics as necessary for pain to be tolerable.      DO NOT SMOKE.  IF YOU NEED HELP QUITTING, PLEASE TALK WITH YOUR CARDIOLOGIST OR PRIMARY DOCTOR.    You had a heart transplant, so call your Transplant Coordinator with all questions or concerns.     REGARDING PRESCRIPTION REFILLS.  If you need a refill on your pain medication contact us to discuss your pain and a possible one time refill.   All other medications will be adjusted,  discontinued and re-filled by your primary care physician and/or your cardiologist as they were prior to your surgery. We have given you enough for one to three month with possibly one refill.    POST-OPERATIVE CLINIC VISITS  Your heart transplant coordinator will set up and manage your follow up care. Please call them with any questions/concerns you have    PRE-ADMISSION MEDICATIONS:    No current facility-administered medications on file prior to encounter.   Current Outpatient Medications on File Prior to Encounter:  albuterol (PROAIR HFA/PROVENTIL HFA/VENTOLIN HFA) 108 (90 Base) MCG/ACT inhaler Inhale 2 puffs into the lungs every 6 hours as needed for shortness of breath / dyspnea or wheezing   Alpha-D-Galactosidase (BEANO PO) Take 2 tablets by mouth 3 times daily    DULoxetine (CYMBALTA) 20 MG EC capsule Take 20 mg by mouth daily   Ergocalciferol (VITAMIN D2 PO)    fluticasone (FLOVENT HFA) 220 MCG/ACT Inhaler Inhale 2 puffs into the lungs 2 times daily   levothyroxine (SYNTHROID/LEVOTHROID) 100 MCG tablet Take 100 mcg by mouth daily    mesalamine (ASACOL HD) 800 MG EC tablet Take 3 tablets (2,400 mg) by mouth 2 times daily   montelukast (SINGULAIR) 10 MG tablet Take 1 tablet (10 mg) by mouth At Bedtime   potassium chloride ER (K-DUR/KLOR-CON M) 20 MEQ CR tablet Take 40 mg in the am and 20 mg in the afternoon.   BETA BLOCKER NOT PRESCRIBED, INTENTIONAL, Beta Blocker not prescribed intentionally due to Recent tx with IV positive inotropic agent (Patient not taking: Reported on 1/30/2019)        DISCHARGE MEDICATIONS:      Review of your medicines      START taking      Dose / Directions   acetaminophen 325 MG tablet  Commonly known as:  TYLENOL      Dose:  650 mg  Take 2 tablets (650 mg) by mouth every 4 hours as needed for mild pain  Refills:  0     alum & mag hydroxide-simethicone 400-400-40 MG/5ML Susp suspension  Commonly known as:  MYLANTA ES/MAALOX  ES      Dose:  30 mL  Take 30 mLs by mouth every 4 hours  as needed for indigestion  Refills:  0     aspirin 81 MG chewable tablet  Commonly known as:  ASA  Used for:  S/P orthotopic heart transplant (H)      Dose:  81 mg  Take 1 tablet (81 mg) by mouth daily  Refills:  0     bumetanide 2 MG tablet  Commonly known as:  BUMEX  Used for:  S/P orthotopic heart transplant (H)      Dose:  4 mg  Take 2 tablets (4 mg) by mouth 2 times daily  Refills:  0     calcium carbonate 500 MG chewable tablet  Commonly known as:  TUMS      Dose:  1 chew tab  Take 1 tablet (500 mg) by mouth daily as needed for heartburn  Refills:  0     calcium carbonate 600 mg-vitamin D 400 units 600-400 MG-UNIT per tablet  Commonly known as:  CALTRATE      Dose:  1 tablet  Take 1 tablet by mouth 2 times daily (with meals)  Refills:  0     magnesium oxide 400 MG tablet  Commonly known as:  MAG-OX      Dose:  400 mg  Take 1 tablet (400 mg) by mouth daily  Refills:  0     melatonin 3 MG tablet      Dose:  6 mg  Take 2 tablets (6 mg) by mouth At Bedtime  Refills:  0     multivitamin RENAL 1 MG capsule      Dose:  1 capsule  Take 1 capsule by mouth daily  Refills:  0     mycophenolate 250 MG capsule  Commonly known as:  GENERIC EQUIVALENT      Dose:  1500 mg  Take 6 capsules (1,500 mg) by mouth 2 times daily  Refills:  0     nystatin 509586 UNIT/ML suspension  Commonly known as:  MYCOSTATIN  Indication:  Candidiasis Fungal Infection of the Oropharynx      Dose:  025157 Units  Take 5 mLs (500,000 Units) by mouth 4 times daily  Refills:  0     oxyCODONE 5 MG tablet  Commonly known as:  ROXICODONE  Used for:  S/P orthotopic heart transplant (H)      Dose:  2.5 mg  Take 0.5 tablets (2.5 mg) by mouth every 4 hours as needed for moderate to severe pain  Quantity:  10 tablet  Refills:  0     polyethylene glycol packet  Commonly known as:  MIRALAX/GLYCOLAX      Dose:  17 g  Take 17 g by mouth daily as needed for constipation  Refills:  0     * predniSONE 10 MG tablet  Commonly known as:  DELTASONE  Used for:  S/P  orthotopic heart transplant (H)      Dose:  10 mg  Take 10 mg by mouth every evening.  Refills:  0     * predniSONE 10 MG tablet  Commonly known as:  DELTASONE  Used for:  S/P orthotopic heart transplant (H)      Dose:  10 mg  Take 10 mg by mouth daily.  Refills:  0     rosuvastatin 10 MG tablet  Commonly known as:  CRESTOR      Dose:  10 mg  Take 1 tablet (10 mg) by mouth daily  Refills:  0     senna-docusate 8.6-50 MG tablet  Commonly known as:  SENOKOT-S/PERICOLACE      Dose:  1 tablet  Take 1 tablet by mouth 2 times daily  Refills:  0     * sulfamethoxazole-trimethoprim 800-160 MG tablet  Commonly known as:  BACTRIM DS/SEPTRA DS  Indication:  Pneumonia caused by Inhaling a Substance Into the Lungs  Used for:  S/P orthotopic heart transplant (H)      Dose:  1 tablet  Take 1 tablet by mouth daily for 2 days  Refills:  0     * sulfamethoxazole-trimethoprim 400-80 MG tablet  Commonly known as:  BACTRIM/SEPTRA  Indication:  Preventative Medication Therapy Used Around Surgery  Used for:  S/P orthotopic heart transplant (H)      Dose:  1 tablet  Start taking on:  4/5/2019  Take 1 tablet by mouth daily On Tuesdays, Thursdays, and Saturdays starting on 4/5/19.  Refills:  0     tacrolimus 1 MG capsule  Commonly known as:  GENERIC EQUIVALENT  Used for:  S/P orthotopic heart transplant (H)      Dose:  3 mg  Take 3 capsules (3 mg) by mouth 2 times daily  Refills:  0     valGANciclovir 450 MG tablet  Commonly known as:  VALCYTE  Indication:  Medication Treatment to Prevent Cytomegalovirus Disease      Dose:  450 mg  Start taking on:  4/4/2019  Take 1 tablet (450 mg) by mouth every other day  Refills:  0         * This list has 4 medication(s) that are the same as other medications prescribed for you. Read the directions carefully, and ask your doctor or other care provider to review them with you.            CONTINUE these medicines which may have CHANGED, or have new prescriptions. If we are uncertain of the size of  tablets/capsules you have at home, strength may be listed as something that might have changed.      Dose / Directions   azelastine 0.1 % nasal spray  Commonly known as:  ASTELIN  This may have changed:      when to take this    reasons to take this      Dose:  2 spray  Spray 2 sprays into both nostrils 2 times daily as needed for rhinitis or allergies  Refills:  0     BETA BLOCKER NOT PRESCRIBED  Commonly known as:  INTENTIONAL  This may have changed:  additional instructions  Used for:  S/P orthotopic heart transplant (H)      Beta Blocker not prescribed intentionally due to Other: Heart Transplant  Refills:  0     omeprazole 40 MG DR capsule  Commonly known as:  priLOSEC  This may have changed:      when to take this    additional instructions  Used for:  Nausea      Dose:  40 mg  Take 1 capsule (40 mg) by mouth 2 times daily (before meals)  Refills:  0     potassium chloride ER 10 MEQ CR tablet  Commonly known as:  K-DUR/KLOR-CON M  This may have changed:      medication strength    how much to take    how to take this    when to take this    additional instructions  Used for:  S/P orthotopic heart transplant (H)      Dose:  20 mEq  Take 2 tablets (20 mEq) by mouth 3 times daily  Refills:  0        CONTINUE these medicines which have NOT CHANGED      Dose / Directions   albuterol 108 (90 Base) MCG/ACT inhaler  Commonly known as:  PROAIR HFA/PROVENTIL HFA/VENTOLIN HFA  Used for:  Intermittent asthma without complication, unspecified asthma severity      Dose:  2 puff  Inhale 2 puffs into the lungs every 6 hours as needed for shortness of breath / dyspnea or wheezing  Refills:  0     ASACOL  MG EC tablet  Generic drug:  mesalamine      Dose:  2400 mg  Take 3 tablets (2,400 mg) by mouth 2 times daily  Refills:  0     BEANO PO      Dose:  2 tablet  Take 2 tablets by mouth 3 times daily  Refills:  0     DULoxetine 20 MG capsule  Commonly known as:  CYMBALTA      Dose:  20 mg  Take 20 mg by mouth daily  Refills:   0     fluticasone 220 MCG/ACT inhaler  Commonly known as:  FLOVENT HFA      Dose:  2 puff  Inhale 2 puffs into the lungs 2 times daily  Refills:  0     levothyroxine 100 MCG tablet  Commonly known as:  SYNTHROID/LEVOTHROID      Dose:  100 mcg  Take 100 mcg by mouth daily  Refills:  0     SINGULAIR 10 MG tablet  Used for:  Intermittent asthma without complication, unspecified asthma severity  Generic drug:  montelukast      Dose:  10 mg  Take 1 tablet (10 mg) by mouth At Bedtime  Refills:  0     VITAMIN D2 PO      Refills:  0        STOP taking    amiodarone 200 MG tablet  Commonly known as:  PACERONE/CODARONE        FLOMAX 0.4 MG capsule  Generic drug:  tamsulosin        furosemide 20 MG tablet  Commonly known as:  LASIX        milrinone 20-5 MG/100ML-% infusion  Commonly known as:  PRIMACOR        ondansetron 4 MG ODT tab  Commonly known as:  ZOFRAN-ODT        spironolactone 25 MG tablet  Commonly known as:  ALDACTONE        warfarin 5 MG tablet  Commonly known as:  COUMADIN              Where to get your medicines      Information about where to get these medications is not yet available    Ask your nurse or doctor about these medications    oxyCODONE 5 MG tablet           CC:Fredrick Black Cindy Maliea      [facility]   Cardiothoracic Surgery  Office phone: [phone]  Office fax: [phone]

## 2019-04-01 NOTE — PROGRESS NOTES
Met with pt and parents about post transplant follow up. Reviewed biopsy schedule, meds, importance of 2-hour trough on tac levels, follow appts at [facility].     Pt has med card, lab book and transplant hand book. Enc to familiarize self with meds and keep card up to date. Reviewed day-to-day routines (has scale and BP machine at home), when and how to call coordinator, s/sx of rejection, preventive cares.     Post discharge - rehab at [facility]? Discussed returning to work in the future. Pt said he plans to return to [employer].    Enc to call with questions an concerns.

## 2019-04-01 NOTE — PROGRESS NOTES
CVTS Daily Note  4/1/2019  Attending: Dewayne House, *    S:   No overnight events.  Feeling a little better each day   Pt seen at bedside resting comfortably. No acute complaints besides foot edema.      Denies F/C/Sweats.  No CP, SOB, or calf pain.    Tolerating diet.  + BM.  + Flatus.    Ambulated well with less assistance.    Pain level tolerable. Plan as per Neuro section below.     O:   Vitals:    03/31/19 1543 03/31/19 1939 04/01/19 0040 04/01/19 0712   BP: 120/88 (!) 129/91  119/74   BP Location: Left arm Left arm  Left arm   Pulse:  91     Resp: 16 18  18   Temp: 97.7  F (36.5  C) 98.1  F (36.7  C)  98.2  F (36.8  C)   TempSrc: Oral Oral  Oral   SpO2: 100% 100%  98%   Weight:   56 kg (123 lb 8 oz)    Height:         Vitals:    03/30/19 0217 03/31/19 0307 04/01/19 0040   Weight: 58.1 kg (128 lb 1.6 oz) 57 kg (125 lb 11.2 oz) 56 kg (123 lb 8 oz)       Intake/Output Summary (Last 24 hours) at 3/31/2019 1203  Last data filed at 3/31/2019 1100  Gross per 24 hour   Intake 1250 ml   Output 1550 ml   Net -300 ml       MAPs: 82 - 109   Gen: AAO x 3, pleasant, NAD  CV: RRR, S1S2 normal, no murmurs, rubs, or gallops.   Pulm: CTA, no rhonchi or wheezes  Abd: soft, non-tender, no guarding  Ext: trace peripheral edema, 1 + pitting at feet   Incision: clean, dry, intact, no erythema  Chest Tube sites: dressings clean and dry      Labs:  [facility]  Recent Labs   Lab 04/01/19  0653 03/31/19  0551 03/30/19 1928 03/30/19  0531    135 135 134   POTASSIUM 4.6 3.6 3.4 3.4   CHLORIDE 104 104 100 102   RADAMES 8.2* 7.8* 7.8* 7.8*   CO2 19* 17* 20 18*   BUN 53* 61* 63* 70*   CR 2.11* 2.19* 2.39* 2.25*   GLC 86 126* 105* 101*     CBC  Recent Labs   Lab 04/01/19  0653 03/31/19  0551 03/30/19  0531 03/29/19  0430   WBC 7.9 6.5 5.2 5.4   RBC 2.81* 2.49* 2.56* 2.66*   HGB 8.4* 7.6* 7.7* 8.2*   HCT 28.1* 24.5* 25.0* 26.1*    98 98 98   MCH 29.9 30.5 30.1 30.8   MCHC 29.9* 31.0* 30.8* 31.4*   RDW 18.9* 18.7* 19.1* 19.6*   PLT  299 280 260 264     INR  Recent Labs   Lab 03/27/19  0521 03/26/19  0531   INR 1.39* 1.38*      Hepatic Panel   Lab Results   Component Value Date    AST 16 03/26/2019     Lab Results   Component Value Date    ALT 23 03/26/2019     Lab Results   Component Value Date    ALBUMIN 2.4 03/26/2019     GLUCOSE:   Recent Labs   Lab 04/01/19  0653 03/31/19  0551 03/30/19  1928 03/30/19  0531 03/29/19  0430 03/29/19  0211 03/28/19  2211 03/28/19  1732 03/28/19  1208 03/28/19  0752 03/28/19  0558 03/28/19  0134   GLC 86 126* 105* 101* 94  --   --   --   --   --  142*  --    BGM  --   --   --   --   --  99 140* 209* 82 137*  --  185*       Imaging:  reviewed recent imaging      A/P:  Everton Larios is a 53 year old male with PMH etoh abuse, hypothyroidism, intermittent asthma, ulcerative colitis, Depression, Chronic HFrEF, NICM admitted with cardiogenic shock requiring subclavian balloon pump. He is now s/p OHT 2/24/19 with Piyush House and Christopher.    - RHC and biopsy 3/4;   0R, no AMR  - RHC and biopsy 3/11; 0R, no AMR  - RHC and biopsy 3/18; 1R, no AMR   - RHC and biopsy 3/25;  0R, biopsy suggestive of AMR.   - RHC and biopsy 3/28;  0R, no AMR      Neuro:   - Neuro intact. ICU delirium improving. Head CT 3/9 without acute pathology.   - Hx Depression: pta cymbalta  - Sleep: melatonin   - Tylenol and oxycodone PRN for pain      CV:   - Hx of NICM, now S/P heart transplant 2/24. No arrhythmia, HD stable.  - ASA 81 mg, Crestor. Sildenafil 20 mg TID.   - Immunosuppression per cardiology   - RHC 3/25 Mild elevated filling pressures. Clinically stable off dobutamine.   - Removed lena drain to right pacer pocket 3/26.   - RHC 3/28/19: RA 9, PA 35/19 (25), PCWP 18, Jaden CO/CI 5.4/3.2  - Possible mild antibody medicated rejection on bx from 3/25, result from 3/28 was no rejection. Cardiology following and with adjust immunosuppression.      Pulm:   - Hx intermittent asthma, pta Singulair.   - Pulm toilet, IS, activity and deep  breathing   - Supplemental O2 PRN to keep sats > 92%. Wean off as tolerated.  - LLL consolidation, albuterol and mucomyst Nebs today x 2, assess response.   - Sputum culture showing Klebsiella- zosyn started 3/21, changed to bactrim with sensitivities, ID recommended Bactrim for 10 days.        ID:   - WBC 5.2, afebrile, no signs or symptoms of infection other than SOB. Completed 7 days cefepime/zosyn 3/16.   - Blood cultures 3/13: NGTD. C diff negative 3/17.    - Sputum culture positive 3/20, bactrim, ID consulted, continue bactrim for klebsiella pneumonia. Discuss duration with ID.       GI / FEN:  - Reg diet, bowel regimen. Calorie counts. Na 134  - Tube feeds were off 3/21 for nausea. New formula 3/22 pm. - removed NJ and stopped tube feeding 3/25, patient refusing TF since 3/20.   - Hx ulcerative colitis; pta mesalamine. Stool occult negative 3/20.  - Increased PPI to bid for GERD sx, reports having been diagnoses with Pierce's esophagus in the past.   - RUQ abdominal pain: RUQ US 3/26 showing mild gallbladder wall thickening with biliary sludge. No classic biliary symptoms, no fevers or leukocytosis. Biliary dyskinesia?      Renal / :   - Creatinine 2.19, improved UOP, on PO Bumex.   - Nephrology following for oliguria and T/Th/Sa hemodialysis. Unable to get tunneled dialysis catheter due to lena drain to old right pacer pocket. RIJ dialysis catheter no longer functional and dialysis unable to be performed 3/21. Patient is showing signs of renal recovery but still not enough to go completely without dialysis. RIJ dialysis catheter replaced on 3/22 and removed on 2/29.   - Off dialysis since 3/23, electrolytes stable. Nephrology believes he will no longer need dialysis. Bumex challenge 3 IV 3/26 with 1.1L UOP; 3/27, 875 of U/o. UA clean 3/26.  - Cardiology following for diuresis. Planning Bumex 2 BID with 40 mEq K TID      Heme / Anticoagulation:  - Got 1 unit PRBC for Hgb 6.9 on 3/24, hgb 7.6, stable. No  signs of bleeding.   - Will need coumadin eventually for SVC graft per Dr House. Will need to discuss with Cardiology, may prefer Apixaban due to frequent biopsies.    - No biopsies or central line access on left due to SVC graft.   - Holding Hep gtt since 3/18. Has R chest hematoma shouldn't interfere with RIGHT chest HD tunneled catheter placement. Chest CT 3/19 confirms R chest hematoma at old AICD site, s/p drainage in OR 3/21.      Endo:   - Hypothyroid: levothyroxine 100 mcg daily   - Sliding corrective scale qid      PPX:   - Protonix     Dispo:   - 6C since 3/16  - Medically stable for discharge since Friday 3/29  - ARU when bed available. No dialysis needed.        Discussed with CVTS Fellow   Staff surgeons have been informed of changes through both  verbal and written communication.      Arcadio Blackburn PA-C  Cardiothoracic Surgery  Pager [phone]    12:03 PM   March 31, 2019

## 2019-04-01 NOTE — PROGRESS NOTES
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    Apply critic aid paste.     With repeat application, do not scrub the paste, only remove soiled paste and reapply.    If complete removal of paste is necessary use baby oil/mineral oil and soft wash cloth.    Pt would benefit from pulsate bed.  Anticipate discharge to  rehab later today or tomorrow.  Spoke with RN to ask for bed to be ordered when giving report.      Orders Written  WO Nurse follow-up plan:weekly  Nursing to notify the Provider(s) and re-consult the WO Nurse if wound(s) deteriorates or new skin concern.    Patient History  According to provider note(s):  Everton Larios is a 53 year old male with PMH etoh abuse, hypothyroidism, intermittent asthma, ulcerative colitis, Depression, Chronic HFrEF, NICM admitted with cardiogenic shock requiring subclavian balloon pump. He is now s/p OHT 19 with Piyush House and Christopher.      Objective Data  Containment of urine/stool: Continent of bowel and Continent of bladder    Active Diet Order  Orders Placed This Encounter      Regular Diet Adult      Output:   I/O last 3 completed shifts:  In:  [P.O.:]  Out: 1625 [Urine:1625]    Risk Assessment:   Sensory Perception: 3-->slightly limited  Moisture: 4-->rarely moist  Activity: 3-->walks occasionally  Mobility: 3-->slightly limited  Nutrition: 3-->adequate  Friction and Shear: 2-->potential problem  Jl Score: 18                          Labs:   Recent Labs   Lab 19  0653  19  0521   ALBUMIN 2.8*  --   --    HGB 8.4*   < > 7.6*   INR  --   --  1.39*   WBC 7.9   < > 3.9*    < > = values in this interval not displayed.        Physical Exam  Skin inspection: focused  cleft    Wound Location:  Coccyx  Date of last photo declined  Wound History: Pt states he has had frequent loose stool and gas while in hospital.  He is independant in bed and able to turn and reposition freely.  Denies pain, burning or itching to coccyx with palpation. Pt was was resistant to assessment at first but allowed quick assessment.   Measurements are approximate.    Measurements (length x width x depth, in cm) 3 x 0.5 x 0.1 cm   Wound Base: dermis  Palpation of the wound bed: normal   Periwound skin: dry/scaly  Color: normal and consistent with surrounding tissue  Temperature: normal   Drainage:, none  Description of drainage: none  Odor: none  Pain: denies     Interventions  Current support surface: Standard  Atmos Air mattress  Current off-loading measures: Pillows under calves  Visual inspection of wound(s) completed  Wound Care: done per plan of care  Supplies: at bedside, floor stock and discussed with RN  Education provided: importance of repositioning, plan of care and wound progress  Discussed plan of care with Patient and Nurse    Halima Barrientos RN

## 2019-04-01 NOTE — PLAN OF CARE
OT 6C: Pt had just returned to bed on attempt this PM and then later working with PM. Unable to check back. Will reschedule for tomorrow.

## 2019-04-01 NOTE — PHARMACY-TRANSPLANT NOTE
Solid Organ Transplant Recipient Prior to Discharge Note    54 year old male s/p heart transplant on 02/24/2019.    Pharmacy has monitored for medication interactions and immunosuppression levels in conjunction with the multidisciplinary team. In anticipation for discharge, medication therapy needs have been addressed daily throughout the current admission via multidisciplinary rounds and/or discussions, order verification, daily clinical pharmacy review, and communication with prescribers.    Dyana Alejo, PharmD

## 2019-04-01 NOTE — PLAN OF CARE
Discharge Planner PT 6C  Patient plan for discharge: rehab  Current status: pt completes bed mobility IND; sit <> stand transfer requires range of Min A to Mod A depending on surface height, up to FWW. Needs cues to avoid excessive lifting/pushing/pulling with BUE within transfers. Ambulates x 60' + 75'  x 2 with FWW and CGA with w/c follow; requires seated rest breaks due to fatigue. Fatigues quickly with trial of Nustep. VSS on RA.   Barriers to return to prior living situation: medical status, weakness, fatigue, mobility status  Recommendations for discharge: ARU  Rationale for recommendations: pt significantly below baseline, will benefit from therapeutic intensity of ARU to return to PLOF.        Entered by: Stevie Grey 04/01/2019 3:37 PM

## 2019-04-01 NOTE — PROVIDER NOTIFICATION
Surgery cross cover notified that patient refused 1900 BMP lab draw. Pt upset and verbally abusive towards staff regarding frequency of lab draws and needing venipuncture d/t no blood return from midline. Per MD, try to minimize disturbances to patient.

## 2019-04-01 NOTE — PROGRESS NOTES
Transplant Social Work Services Progress Note      Date of Initial Social Work Evaluation: 11/16/18, 1/30/19  Collaborated with:  ARU and CVTS    Data: Pt is s/p heart transplant on 2/24/2019. SW notified by CVTS that pt is medically  ready to discharge. Writer contacted  ARU admissions and they are submitting for insurance authorization today.   authorization today. Anticipate receiving authorization tomorrow.     Intervention: Discharge Planning, Emotional Support    Assessment: Pt was updated on status of discharge planning and eager to transition to ARU. Pt has no other questions or concerns today.   Education provided by SW: Ongoing SW support  Plan:    Discharge Plans in Progress: Anticipate discharge to  ARU once insurance authorization received.     Barriers to d/c plan: Insurance     Follow up Plan:  to continue to follow.     Addendum:  1615: Collaborated with  ARU admissions liaison. Anticipate facility will receive authorization tomorrow and have scheduled ride for 1430. Writer will collaborate with  ARU admissions liaison in the AM to confirm plan. CVTS updated. Writer will update pt tomorrow once discharge confirmed.

## 2019-04-01 NOTE — PLAN OF CARE
"BP (!) 129/91 (BP Location: Left arm)   Pulse 91   Temp 98.1  F (36.7  C) (Oral)   Resp 18   Ht 1.727 m (5' 8\")   Wt 56 kg (123 lb 8 oz)   SpO2 100%   BMI 18.78 kg/m      Alert and oriented x4. Labile moods. Argumentative and uncooperative at times. Refused labs and vitals overnight. MDs aware. VSS on RA. Sinus rhythm. Tylenol given x1 for back pain. On regular diet with 2L fluid restriction. No BM overnight. Voiding adequate amounts. Sternal incision and old CT sites ELEONORA. Dressing on right chest CDI. Up with assist of 1-2 with gait belt and walker. Pt slept between cares. Plan for possible discharge to ARU today. Will continue to monitor and notify MDs accordingly.  "

## 2019-04-01 NOTE — PROGRESS NOTES
CLINICAL NUTRITION SERVICES - REASSESSMENT NOTE     Nutrition Prescription    RECOMMENDATIONS FOR MDs/PROVIDERS TO ORDER:  None additionally at this time.    Malnutrition Status:    Non-severe malnutrition in the context of chronic illness    Recommendations already ordered by Registered Dietitian (RD):  Modified multivitamin with minerals  Modified oral supplement    Future/Additional Recommendations:  1. Continue current diet, as ordered. Rec small, frequent meals and high protein/kcal options as tolerated. If oral intake decreases, consider liberalizing diet order to a 3 g sodium diet.   2. Continue fluid restriction as per team.   3. Continue calcium/vitamin D, as ordered, since pt is on prednisone.  4. Monitor BG control. Hgb A1c of 5.7 on 2/23/19.   5. Consider checking folic acid, vitamin B12, and Hcy.      EVALUATION OF THE PROGRESS TOWARD GOALS   Diet: 2 g sodium diet and on a 2 L fluid restriction. Ordered to receive chocolate Magic Cup at HS and pineapple Gelatein Plus at 14:00.   Intake: Per nursing flowsheets, good diet tolerance. Flowsheets indicate pt consuming % of meals with a good appetite 3/26, 75% of meals with a good appetite 3/27, % of meals with a good appetite 3/28, 0-100% of meals with a fair to good appetite 3/29, % of meals with a good appetite 3/30, 100% of meals with a good appetite 3/31, and % of meals with a good appetite 4/1. Factors affecting nutrition intake include decreased appetite post-op and previous GI sx. Per pt, his appetite has been improving over the past week. States he has less fullness (early satiety). Pt denies N/V. Per RN on 3/31, good appetite. Disliked the Gelatein Plus. States he does not mind the Magic Cup.    Kcal counts:   3/25   720 kcals and 33 g protein (100% banana, pineapple, 75% cupcake and 100% 1 Gelatein Plus, 1 Boost Breeze  supplement/s recorded)   3/26   647 kcals and 43 g protein (100% grapes, 75% omelet w/ sausage & cheese  "and 100% 1 Gelatein Plus  supplement/s recorded)   3/27   315 kcals and 2 g protein (50% of two pancakes with butter and syrup and no supplement/s recorded) - Supper was recorded. Pt did not order lunch. For breakfast, he only ordered fruit which totaled 360 kcals and 5 g protein.   3/28   365 kcals and 3 g protein (One meal/s and no supplement/s recorded) - Pt ordered two meals yesterday which totaled 1080 kcals and 54 g protein. Kcal counts may not include all data.    * Pt consumed a four-day kcal count average of 512 kcals and 20 g protein. Not meeting estimated needs of 3885-0442+ kcals/day (30 - 35+ kcals/kg) and  grams protein/day (1.5 - 2 grams of pro/kg). Pt has been NPO, at times, due to tests/procedures.       Nutrition Support:  - Previous Feeding Tube (FT) access: NDT (cortrak) + bridle placed on 3/11. FT pulled on 3/25 as oral intake was improving.   - Previous TF orders: Peptamen 1.5 (8p-6a) to provide 600 mL TF, 900 kcals (16 kcals/kg), 41 g protein (0.7 g protein/kg), 462 mL H2O, 113 g CHO, and no fiber daily. TFs were stopped on 3/20 and did not infuse due to GI sx before being discontinued on 3/25.     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 Status: initial assessment.\" No note of pressure injury.    MALNUTRITION  % Intake: < 75% for > 7 days (non-severe)  % Weight Loss: > 10% in 6 months (severe)  Subcutaneous Fat Loss: Facial region:  Mild  Muscle Loss: Thoracic region (clavicle, acromium bone, deltoid, trapezius, pectoral): Moderate; Temporal: Mild  Fluid Accumulation/Edema: Does not meet criteria  Malnutrition Diagnosis: Non-severe malnutrition in the context of chronic illness    Previous Goals   Patient to consume % of nutritionally adequate meal trays TID, or the equivalent with supplements/snacks.  Evaluation: Not met, but improving.     Previous Nutrition Diagnosis  Inadequate oral intake related to nausea, " abdominal pain, heartburn, bloating, and NPO frequently for tests/procedures as evidenced by pt consumed a four-day average of 911 kcals and 40 g protein daily which does not meet estimated needs of [phone]+ kcals/day (30 - 35+ kcals/kg) and  grams protein/day (1.5 - 2 grams of pro/kg). Kcal counts improved from a week ago.    Evaluation: Unresolved, but improving. Updated below.     CURRENT NUTRITION DIAGNOSIS  Inadequate oral intake related to decreased appetite post-op and previous GI sx as evidenced by pt consumed a four-day kcal count average of 512 kcals and 20 g protein and not meeting estimated needs of [phone]+ kcals/day (30 - 35+ kcals/kg) and  grams protein/day (1.5 - 2 grams of pro/kg).    INTERVENTIONS  Implementation  Medical food supplement therapy: Modified Magic Cup to send vanilla or chocolate at HS. Discontinued Gelatein Plus.  Collaboration with other providers: Discussed pt with team.   Multivitamin/mineral supplement therapy: Changed nephrocaps to thera-vit-M, Dialysis was discontinued.     Goals  Patient to consume % of nutritionally adequate meal trays TID, or the equivalent with supplements/snacks.    Monitoring/Evaluation  Progress toward goals will be monitored and evaluated per protocol.     Nutrition will continue to follow.      Jannie Guerrero, MS, RD, LD, [facility]   6C Pgr: [phone]

## 2019-04-01 NOTE — PLAN OF CARE
"Neuro: A&Ox4.   Cardiac: SR. VSS. BP (!) 113/93 (BP Location: Left arm)   Pulse 91   Temp 98  F (36.7  C) (Oral)   Resp 18   Ht 1.727 m (5' 8\")   Wt 56 kg (123 lb 8 oz)   SpO2 100%   BMI 18.78 kg/m      Respiratory: Sating 100% on RA.  GI/: Adequate urine output--one time dose PO Bumex given in addition to scheduled bid Bumex. BM X1  Diet/appetite: Tolerating low salt diet. Appetite improving. 2L FR.  Activity:  Assist of 1, up to chair and bathroom. Pt reports being more fatigued today, PT/OT following.  Pain: Pt reports back discomfort, PRN Tylenol given once.   Skin: Old open area on coccyx, barrier cream applied after stools. See WOC note. Generalized bruising.  LDA's: PICC    Plan: Continue with POC. Pt will discharge to ARU hopefully tomorrow pending insurance approval. Notify primary team with changes.   "

## 2019-04-01 NOTE — CONSULTS
[facility]   Cardiology II Service / Advanced Heart Failure  Daily Progress Note  Date of Service: 4/1/2019      Patient: Everton Larios  MRN: [id number]  Admission Date: 2/17/2019  Hospital Day # 43    Assessment and Plan: Everton Larios is a 54 year old male with a past medical history of alcohol abuse, hypothyroidism, asthma, ulcerative colitis, depression, NICM with cardiogenic shock requiring IABP.  He is now s/p OHT on 2/24/19.  His post operative course was complicated by RV dysfunction and FRANKLIN requiring hemodialysis.  We were consulted by CVTS for immunosuppression management.     Today's Plan:  1. Increase Bumex to 4 mg twice daily from 2 mg three times daily  2. Restart ASA 81 mg daily, ok per CVTS  3. ARU once bed is available.   4. Change to 2 gm sodium diet  5. Strict I and O    NICM  S/p OHT on 2/24/19  Serostatus:  CMV+, EBV+, HSV1+, HSV2 neg, VZV+;     Immunosuppression:  Did not received Simulect induction.   Cellcept:   1500 mg twice daily  Tacrolimus:  2.5 mg twice daily. Tacro goal: 10-12.  Tacro level 3/31: 7.1.  Increase tacrolimus to 3 mg twice daily.  Continue checking levels every 48 hours. Tacro level due tomorrow.   Prednisone:  10 mg twice daily.    Prophylaxis:  CAV:  ASA 81 mg daily.  Rosuvastatin 10 mg daily   Thrush:  Nystatin 500,000 units four times daily.   GI:  Protonix mg 40 mg twice daily.   CMV D+/R+, EBV D+/R+, HSV1+, HSV2 neg, VZV+; Valcyte 450 mg twice a week. .  Weekly CMV PCR for now per ID through mid April.  Due 4/1  Osteoporosis prevention: Calcium/Vitamin D 600/400 mg twice daily  PCP prophylaxis: After 10 day course of treatment for pneumonia, (800/160 mg daily) decrease Bactrim to 400/80 mg daily on T, Thurs, Saturday on 4/5.   Received pentamidine on 3/3.      Studies:  ECHO: 3/26/19:  Moderate concentric wall thickening consistent with left ventricular hypertrophy is present. Global and regional left ventricular function is normal with an  EF of 60-65%. Right ventricle is moderately dilated with hypertrophy and systolic dysfunction that is slightly worse than on 3/5/19. IVC is dilated and RA pressure estimated 15 mmHg.  RHC: 3/11/19:  RA 27/23/17, RV 40/15, PA 40/19/30, PCW 19, PA sat 52.5%, PCW sat 96.1%, Hgb 7.5 g/dL, Jaden CO 4.7, Jaden CI 2.8, TD CO 5.0, TD CI 2.9   PVR 2.4     BIOPSIES/HISTORY OF GRAFT REJECTION:     3/4:     0R, no AMR  3/11:   0R, no AMR  3/18    1R, no AMR  3/25:   0R, suggestive of AMR  3/28:   0R    Fluid Status: Hypervolemic.  Increase Bumex to 4 mg twice daily.   HTN:  Overall controlled with intermittent higher readings. Continue to monitor for now.    Other:  RV function mildly to moderately reduced per last echo.     Klebsiella pneumonia:  Chest xray done 3/22 with ground glass opacity in right lung 3/19.  S/p Cefepime/zosyn x 7 days (completed on 3/16), now on 10 day course of Bactrim per ID's recommendations.    -Continue Bactrim 800/160 for 10 day course.  Last day: 4/4/19    FRANKLIN on CKD: Improving with diuresis.  -Renal consulted and following.  Signed off. Needs nephrology appointment once outpatient.    -Creatinine today 2.11 today.  (2.19 Yesterday).  Suspect cardiorenal as his creatinine has trended down with more aggressive diuresis.   -Continue to trend daily levels. Increase Bumex as outlined above.   ================================================================    Interval History/ROS:  Everton had a rough night and didn't sleep well due to several interruptions. His breathing has improved.  His legs and feet are still swollen.  Feet are .  L>R.  Denies SOB at rest, PND, orthopnea, or palpitations.  His appetite is still improving.  Ready to go to rehab and leave the hospital.     Last 24 hr care team notes reviewed.   ROS:  4 point ROS including Respiratory, CV, GI and , other than that noted in the HPI, is negative.     Medications: Reviewed in EPIC.     Physical Exam:   /74 (BP Location:  "Left arm)   Pulse 91   Temp 98.2  F (36.8  C) (Oral)   Resp 18   Ht 1.727 m (5' 8\")   Wt 56 kg (123 lb 8 oz)   SpO2 98%   BMI 18.78 kg/m    GENERAL: Alert and oriented times three. Comfortable.  No acute distress.  HEENT: EOM's conjugate. Mucous membranes moist and without lesions.  NECK: Supple and without lymphadenopathy. JVD 11-12 cm.  No carotid bruits.  CV: Rhythm regular S1S2 present without murmur, rub, or gallop.   RESPIRATORY: Respirations regular, even, and unlabored. Rales left base. No accessory muscle use.  No rhonchi or wheezing.   GI: Soft and non distended with normoactive bowel sounds present in all quadrants. No tenderness, rebound, guarding. No hepatomegaly.   EXTREMITIES:  2+ pitting edema in calves and feet.  Left foot more edematous than right (3+)compared to yesterday,  2+ bilateral pedal pulses. No clubbing.  NEUROLOGIC: Alert and orientated x 3. CN II-XII grossly intact. No focal deficits.   MUSCULOSKELETAL: No joint swelling or tenderness. No acute deformities.  SKIN: No cyanosis. No rashes or lesions.       Data:  CMP  Recent Labs   Lab 04/01/19  0653 03/31/19  0551 03/30/19  1928 03/30/19  0531 03/29/19  0430  03/26/19  0531    135 135 134 134   < > 131*   POTASSIUM 4.6 3.6 3.4 3.4 3.6   < > 3.1*   CHLORIDE 104 104 100 102 102   < > 98   CO2 19* 17* 20 18* 18*   < > 18*   ANIONGAP 12 13 15* 14 15*   < > 15*   GLC 86 126* 105* 101* 94   < > 133*   BUN 53* 61* 63* 70* 68*   < > 74*   CR 2.11* 2.19* 2.39* 2.25* 2.50*   < > 2.61*   GFRESTIMATED 34* 32* 29* 31* 28*   < > 26*   GFRESTBLACK 39* 38* 34* 36* 32*   < > 30*   RADAMES 8.2* 7.8* 7.8* 7.8* 7.9*   < > 7.8*   MAG 1.8 1.9  --  1.7 1.9   < > 2.3   PHOS 2.5 2.9  --  2.8 3.2   < > 4.4   PROTTOTAL 5.6*  --   --   --   --   --  5.0*   ALBUMIN 2.8*  --   --   --   --   --  2.4*   BILITOTAL 0.6  --   --   --   --   --  0.5   ALKPHOS 135  --   --   --   --   --  110   AST 14  --   --   --   --   --  16   ALT 28  --   --   --   --   --  " 23    < > = values in this interval not displayed.     CBC  Recent Labs   Lab 04/01/19  0653 03/31/19  0551 03/30/19  0531 03/29/19  0430   WBC 7.9 6.5 5.2 5.4   RBC 2.81* 2.49* 2.56* 2.66*   HGB 8.4* 7.6* 7.7* 8.2*   HCT 28.1* 24.5* 25.0* 26.1*    98 98 98   MCH 29.9 30.5 30.1 30.8   MCHC 29.9* 31.0* 30.8* 31.4*   RDW 18.9* 18.7* 19.1* 19.6*    280 260 264     INR  Recent Labs   Lab 03/27/19  0521 03/26/19  0531   INR 1.39* 1.38*       Patient seen and discussed with Dr. Donis.        My LUJAN, CNP  Cards II  Pager [phone]    4/1/2019

## 2019-04-02 ENCOUNTER — TELEPHONE (OUTPATIENT)
Dept: TRANSPLANT | Facility: CLINIC | Age: 54
End: 2019-04-02

## 2019-04-02 LAB
ANION GAP SERPL CALCULATED.3IONS-SCNC: 12 MMOL/L (ref 3–14)
BUN SERPL-MCNC: 50 MG/DL (ref 7–30)
CALCIUM SERPL-MCNC: 7.7 MG/DL (ref 8.5–10.1)
CHLORIDE SERPL-SCNC: 104 MMOL/L (ref 94–109)
CO2 SERPL-SCNC: 21 MMOL/L (ref 20–32)
CREAT SERPL-MCNC: 1.85 MG/DL (ref 0.66–1.25)
ERYTHROCYTE [DISTWIDTH] IN BLOOD BY AUTOMATED COUNT: 18.6 % (ref 10–15)
GFR SERPL CREATININE-BSD FRML MDRD: 40 ML/MIN/{1.73_M2}
GLUCOSE SERPL-MCNC: 96 MG/DL (ref 70–99)
HCT VFR BLD AUTO: 25.7 % (ref 40–53)
HGB BLD-MCNC: 7.9 G/DL (ref 13.3–17.7)
MAGNESIUM SERPL-MCNC: 1.5 MG/DL (ref 1.6–2.3)
MCH RBC QN AUTO: 30.4 PG (ref 26.5–33)
MCHC RBC AUTO-ENTMCNC: 30.7 G/DL (ref 31.5–36.5)
MCV RBC AUTO: 99 FL (ref 78–100)
PHOSPHATE SERPL-MCNC: 3.4 MG/DL (ref 2.5–4.5)
PLATELET # BLD AUTO: 253 10E9/L (ref 150–450)
POTASSIUM SERPL-SCNC: 4.1 MMOL/L (ref 3.4–5.3)
RBC # BLD AUTO: 2.6 10E12/L (ref 4.4–5.9)
SODIUM SERPL-SCNC: 137 MMOL/L (ref 133–144)
TACROLIMUS BLD-MCNC: 8.3 UG/L (ref 5–15)
TME LAST DOSE: NORMAL H
WBC # BLD AUTO: 5.9 10E9/L (ref 4–11)

## 2019-04-02 PROCEDURE — 85027 COMPLETE CBC AUTOMATED: CPT | Performed by: THORACIC SURGERY (CARDIOTHORACIC VASCULAR SURGERY)

## 2019-04-02 PROCEDURE — 25000132 ZZH RX MED GY IP 250 OP 250 PS 637: Performed by: PHYSICIAN ASSISTANT

## 2019-04-02 PROCEDURE — 84100 ASSAY OF PHOSPHORUS: CPT | Performed by: THORACIC SURGERY (CARDIOTHORACIC VASCULAR SURGERY)

## 2019-04-02 PROCEDURE — 25000132 ZZH RX MED GY IP 250 OP 250 PS 637: Performed by: NURSE PRACTITIONER

## 2019-04-02 PROCEDURE — 25000132 ZZH RX MED GY IP 250 OP 250 PS 637: Performed by: STUDENT IN AN ORGANIZED HEALTH CARE EDUCATION/TRAINING PROGRAM

## 2019-04-02 PROCEDURE — 80048 BASIC METABOLIC PNL TOTAL CA: CPT | Performed by: THORACIC SURGERY (CARDIOTHORACIC VASCULAR SURGERY)

## 2019-04-02 PROCEDURE — 83735 ASSAY OF MAGNESIUM: CPT | Performed by: THORACIC SURGERY (CARDIOTHORACIC VASCULAR SURGERY)

## 2019-04-02 PROCEDURE — 25000132 ZZH RX MED GY IP 250 OP 250 PS 637: Performed by: INTERNAL MEDICINE

## 2019-04-02 PROCEDURE — 25000128 H RX IP 250 OP 636: Performed by: PHYSICIAN ASSISTANT

## 2019-04-02 PROCEDURE — 25000131 ZZH RX MED GY IP 250 OP 636 PS 637: Performed by: HOSPITALIST

## 2019-04-02 PROCEDURE — 25000132 ZZH RX MED GY IP 250 OP 250 PS 637: Performed by: THORACIC SURGERY (CARDIOTHORACIC VASCULAR SURGERY)

## 2019-04-02 PROCEDURE — 40000802 ZZH SITE CHECK

## 2019-04-02 PROCEDURE — 99232 SBSQ HOSP IP/OBS MODERATE 35: CPT | Performed by: NURSE PRACTITIONER

## 2019-04-02 PROCEDURE — 25000131 ZZH RX MED GY IP 250 OP 636 PS 637: Performed by: NURSE PRACTITIONER

## 2019-04-02 PROCEDURE — 80197 ASSAY OF TACROLIMUS: CPT | Performed by: THORACIC SURGERY (CARDIOTHORACIC VASCULAR SURGERY)

## 2019-04-02 PROCEDURE — 36592 COLLECT BLOOD FROM PICC: CPT | Performed by: THORACIC SURGERY (CARDIOTHORACIC VASCULAR SURGERY)

## 2019-04-02 PROCEDURE — 25000125 ZZHC RX 250: Performed by: NURSE PRACTITIONER

## 2019-04-02 PROCEDURE — 21400000 ZZH R&B CCU UMMC

## 2019-04-02 PROCEDURE — 25000125 ZZHC RX 250: Performed by: INTERNAL MEDICINE

## 2019-04-02 RX ORDER — SULFAMETHOXAZOLE AND TRIMETHOPRIM 400; 80 MG/1; MG/1
1 TABLET ORAL DAILY
Status: ON HOLD | DISCHARGE
Start: 2019-04-05 | End: 2019-04-11

## 2019-04-02 RX ORDER — OXYCODONE HYDROCHLORIDE 5 MG/1
2.5 TABLET ORAL EVERY 4 HOURS PRN
Qty: 10 TABLET | Refills: 0 | Status: ON HOLD | DISCHARGE
Start: 2019-04-02 | End: 2019-04-11

## 2019-04-02 RX ORDER — VALGANCICLOVIR 450 MG/1
450 TABLET, FILM COATED ORAL EVERY OTHER DAY
Status: ON HOLD | DISCHARGE
Start: 2019-04-04 | End: 2019-04-11

## 2019-04-02 RX ORDER — PREDNISONE 10 MG/1
10 TABLET ORAL DAILY
Status: ON HOLD | DISCHARGE
Start: 2019-04-02 | End: 2019-04-12

## 2019-04-02 RX ORDER — POTASSIUM CHLORIDE 750 MG/1
20 TABLET, EXTENDED RELEASE ORAL 3 TIMES DAILY
Refills: 0 | Status: ON HOLD | DISCHARGE
Start: 2019-04-02 | End: 2019-04-11

## 2019-04-02 RX ORDER — BUMETANIDE 2 MG/1
4 TABLET ORAL
Status: ON HOLD | DISCHARGE
Start: 2019-04-02 | End: 2019-04-11

## 2019-04-02 RX ORDER — ASPIRIN 81 MG/1
81 TABLET, CHEWABLE ORAL DAILY
DISCHARGE
Start: 2019-04-02

## 2019-04-02 RX ORDER — SULFAMETHOXAZOLE/TRIMETHOPRIM 800-160 MG
1 TABLET ORAL DAILY
Status: ON HOLD | DISCHARGE
Start: 2019-04-02 | End: 2019-04-11

## 2019-04-02 RX ORDER — TACROLIMUS 1 MG/1
3 CAPSULE ORAL 2 TIMES DAILY
Status: ON HOLD | DISCHARGE
Start: 2019-04-02 | End: 2019-04-11

## 2019-04-02 RX ORDER — PREDNISONE 10 MG/1
10 TABLET ORAL EVERY EVENING
Status: ON HOLD | DISCHARGE
Start: 2019-04-02 | End: 2019-04-12

## 2019-04-02 RX ADMIN — SULFAMETHOXAZOLE AND TRIMETHOPRIM 1 TABLET: 800; 160 TABLET ORAL at 08:35

## 2019-04-02 RX ADMIN — MESALAMINE 2400 MG: 800 TABLET, DELAYED RELEASE ORAL at 19:35

## 2019-04-02 RX ADMIN — MELATONIN TAB 3 MG 9 MG: 3 TAB at 21:07

## 2019-04-02 RX ADMIN — PANTOPRAZOLE SODIUM 40 MG: 40 TABLET, DELAYED RELEASE ORAL at 19:39

## 2019-04-02 RX ADMIN — MESALAMINE 2400 MG: 800 TABLET, DELAYED RELEASE ORAL at 08:36

## 2019-04-02 RX ADMIN — TACROLIMUS 3 MG: 1 CAPSULE ORAL at 17:22

## 2019-04-02 RX ADMIN — POTASSIUM CHLORIDE 40 MEQ: 750 TABLET, EXTENDED RELEASE ORAL at 13:37

## 2019-04-02 RX ADMIN — PREDNISONE 10 MG: 10 TABLET ORAL at 08:35

## 2019-04-02 RX ADMIN — BENZOCAINE, MENTHOL 1 LOZENGE: 15; 3.6 LOZENGE ORAL at 04:29

## 2019-04-02 RX ADMIN — BUMETANIDE 4 MG: 2 TABLET ORAL at 16:02

## 2019-04-02 RX ADMIN — FLUTICASONE PROPIONATE 2 PUFF: 220 AEROSOL, METERED RESPIRATORY (INHALATION) at 19:39

## 2019-04-02 RX ADMIN — Medication 500000 UNITS: at 19:39

## 2019-04-02 RX ADMIN — MAGNESIUM OXIDE TAB 400 MG (241.3 MG ELEMENTAL MG) 400 MG: 400 (241.3 MG) TAB at 08:36

## 2019-04-02 RX ADMIN — Medication 500000 UNITS: at 17:22

## 2019-04-02 RX ADMIN — ASPIRIN 81 MG CHEWABLE TABLET 81 MG: 81 TABLET CHEWABLE at 08:36

## 2019-04-02 RX ADMIN — MAGNESIUM OXIDE TAB 400 MG (241.3 MG ELEMENTAL MG) 400 MG: 400 (241.3 MG) TAB at 19:38

## 2019-04-02 RX ADMIN — POTASSIUM CHLORIDE 40 MEQ: 750 TABLET, EXTENDED RELEASE ORAL at 19:37

## 2019-04-02 RX ADMIN — TACROLIMUS 3 MG: 1 CAPSULE ORAL at 08:35

## 2019-04-02 RX ADMIN — FLUTICASONE PROPIONATE 2 PUFF: 220 AEROSOL, METERED RESPIRATORY (INHALATION) at 08:36

## 2019-04-02 RX ADMIN — Medication 500000 UNITS: at 11:31

## 2019-04-02 RX ADMIN — BUMETANIDE 4 MG: 2 TABLET ORAL at 08:36

## 2019-04-02 RX ADMIN — MONTELUKAST SODIUM 10 MG: 10 TABLET, COATED ORAL at 19:38

## 2019-04-02 RX ADMIN — MULTIPLE VITAMINS W/ MINERALS TAB 1 TABLET: TAB at 08:35

## 2019-04-02 RX ADMIN — Medication 1 TABLET: at 08:36

## 2019-04-02 RX ADMIN — VALGANCICLOVIR 450 MG: 450 TABLET, FILM COATED ORAL at 08:35

## 2019-04-02 RX ADMIN — POTASSIUM CHLORIDE 40 MEQ: 750 TABLET, EXTENDED RELEASE ORAL at 08:36

## 2019-04-02 RX ADMIN — ROSUVASTATIN CALCIUM 10 MG: 10 TABLET, FILM COATED ORAL at 08:36

## 2019-04-02 RX ADMIN — PANTOPRAZOLE SODIUM 40 MG: 40 TABLET, DELAYED RELEASE ORAL at 08:35

## 2019-04-02 RX ADMIN — DULOXETINE 20 MG: 20 CAPSULE, DELAYED RELEASE ORAL at 08:36

## 2019-04-02 RX ADMIN — Medication 1 TABLET: at 17:21

## 2019-04-02 RX ADMIN — MYCOPHENOLATE MOFETIL 1500 MG: 500 TABLET, FILM COATED ORAL at 17:22

## 2019-04-02 RX ADMIN — Medication 5 ML: at 18:53

## 2019-04-02 RX ADMIN — Medication 500000 UNITS: at 08:36

## 2019-04-02 RX ADMIN — MYCOPHENOLATE MOFETIL 1500 MG: 500 TABLET, FILM COATED ORAL at 08:35

## 2019-04-02 RX ADMIN — LEVOTHYROXINE SODIUM 100 MCG: 100 TABLET ORAL at 08:36

## 2019-04-02 RX ADMIN — PREDNISONE 10 MG: 10 TABLET ORAL at 19:38

## 2019-04-02 ASSESSMENT — ACTIVITIES OF DAILY LIVING (ADL)
ADLS_ACUITY_SCORE: 12
ADLS_ACUITY_SCORE: 13
ADLS_ACUITY_SCORE: 10
ADLS_ACUITY_SCORE: 13
ADLS_ACUITY_SCORE: 10
ADLS_ACUITY_SCORE: 13

## 2019-04-02 ASSESSMENT — MIFFLIN-ST. JEOR: SCORE: 1353.35

## 2019-04-02 NOTE — PLAN OF CARE
D: Heart replaced by transplant (H)  (primary encounter diagnosis)  Acute on chronic systolic congestive heart failure (H)  Acute on chronic systolic congestive heart failure (H)  Chronic systolic heart failure (H)  Acute renal failure on dialysis (H)  Nausea    I: Monitored vitals and assessed pt status.     A: A0x4. VSS, on room air. NSR. Afebrile. Denies pain. Good urine output.  Ambulated to bathroom with 1 assist. Pt remains unsteady on feet. Awaiting transfer to  ARU possibly today.    I/O this shift:  In: 30 [I.V.:30]  Out: 750 [Urine:750]    Temp:  [98  F (36.7  C)-98.3  F (36.8  C)] 98  F (36.7  C)  Heart Rate:  [82-88] 88  Resp:  [16-18] 16  BP: (113-133)/(74-96) 133/90  SpO2:  [98 %-100 %] 98 %      P: Continue to monitor Pt status and report changes to treatment team.

## 2019-04-02 NOTE — PROGRESS NOTES
[facility]   Cardiology II Service / Advanced Heart Failure  Daily Progress Note  Date of Service: 4/2/2019      Patient: Everton Larios  MRN: [id number]  Admission Date: 2/17/2019  Hospital Day # 44    Assessment and Plan: Everton Larios is a 54 year old male with a PMH of Hypothyroidism, Mild Intermittent Asthma, UC, Depression, ETOH abuse, NICM complicated by cardiogenic shock s/p IABP with subsequent OHT 2/24/19. Postoperative course complicated by RV dysfunction and FRANKLIN on HD. Cardiology consult for immunosuppression management and fluid management.     S/p OHT. 2/24/19 complicated by RV dysfunction and FRANKLIN on HD. Echo 3/26/19 consistent with moderate wall thickening with LV hypertrophy, EF 60-65%, moderately dilated RV, and dilated IVC. RHC 3/28/19 mRA-9, RV-35/9, mPA-25, mPCW-18, JOSEPHINE CI-2.48, and JOSEPHINE CO-4.11. Biopsy 3/28/19 OR, PAMR0, weak-moderate C4d staining with ischemia and reperfusion injury.  Serostatus: CMV+, EBV+, HSV1+, HSV2 neg, VZV+  Immunosuppression: Cellcept 1500 mg po BID, Tac 3 mg po BID, level pending Goal-8-10. Prednisone 10 mg po BID.   Prophylaxis:  - Nystatin swish and swallow  - Continue daily Bactrim through 4/5/19, change to bi-weekly thereafter  - Weekly CMV DNA quant per ID negative 4/1/19.  - Continue Crestor. ASA held in setting of bleeding.   - Continue Bumex 4 mg po BID for persistent hypervolemia.   - Next biopsy due 4/8/19  - Note loose stools, recommend discontinue senakot.     ================================================================    Interval History/ROS: He complains of loose stools today. He denies fever, chills, chest pain, palpitations, cough, SOB, nausea, vomiting, and oral lesions. Complains of mild LE edema, but is improving. He is tolerating po intake and ambulation with assistance.     Last 24 hr care team notes reviewed.   ROS:  4 point ROS including Respiratory, CV, GI and , other than that noted in the HPI, is negative.  "    Medications: Reviewed in EPIC.     Physical Exam:   /79 (BP Location: Left arm)   Pulse 91   Temp 98.1  F (36.7  C) (Oral)   Resp 16   Ht 1.727 m (5' 8\")   Wt 54.9 kg (121 lb)   SpO2 99%   BMI 18.40 kg/m    GENERAL: Appears alert and oriented times three.   HEENT: Eye symmetrical and free of discharge bilaterally. Mucous membranes moist and without lesions.  NECK: Supple and without lymphadenopathy. JVD 12 cm.   CV: RRR, S1S2 present without murmur, rub, or gallop.   RESPIRATORY: Respirations regular, even, and unlabored. Lungs CTA throughout.   GI: Soft and non distended with normoactive bowel sounds present in all quadrants. No tenderness, rebound, guarding. No organomegaly.   EXTREMITIES: +1 bilateral peripheral edema. 2+ bilateral pedal pulses.   NEUROLOGIC: Alert and orientated x 3. CN II-XII grossly intact. No focal deficits.   MUSCULOSKELETAL: No joint swelling or tenderness.   SKIN: No jaundice. No rashes or lesions.     Data:  CMP  Recent Labs   Lab 04/02/19  0514 04/01/19  0653 03/31/19  0551 03/30/19  1928 03/30/19  0531    134 135 135 134   POTASSIUM 4.1 4.6 3.6 3.4 3.4   CHLORIDE 104 104 104 100 102   CO2 21 19* 17* 20 18*   ANIONGAP 12 12 13 15* 14   GLC 96 86 126* 105* 101*   BUN 50* 53* 61* 63* 70*   CR 1.85* 2.11* 2.19* 2.39* 2.25*   GFRESTIMATED 40* 34* 32* 29* 31*   GFRESTBLACK 46* 39* 38* 34* 36*   RADAMES 7.7* 8.2* 7.8* 7.8* 7.8*   MAG 1.5* 1.8 1.9  --  1.7   PHOS 3.4 2.5 2.9  --  2.8   PROTTOTAL  --  5.6*  --   --   --    ALBUMIN  --  2.8*  --   --   --    BILITOTAL  --  0.6  --   --   --    ALKPHOS  --  135  --   --   --    AST  --  14  --   --   --    ALT  --  28  --   --   --      CBC  Recent Labs   Lab 04/02/19  0514 04/01/19  0653 03/31/19  0551 03/30/19  0531   WBC 5.9 7.9 6.5 5.2   RBC 2.60* 2.81* 2.49* 2.56*   HGB 7.9* 8.4* 7.6* 7.7*   HCT 25.7* 28.1* 24.5* 25.0*   MCV 99 100 98 98   MCH 30.4 29.9 30.5 30.1   MCHC 30.7* 29.9* 31.0* 30.8*   RDW 18.6* 18.9* 18.7* 19.1* "    299 280 260     INR  Recent Labs   Lab 03/27/19  0521   INR 1.39*       Patient discussion with Dr. Donis.      Karlene Ames FNP  4/2/2019

## 2019-04-02 NOTE — PLAN OF CARE
D: Patient is s/p OHT 2/24 for cardiogenic shock. Post op FRANKLIN, on CRRT and then HD which ended 3/23. Medically ready for discharge, awaiting ARU bed.  I/A: Continue medication education reinforcement. On oral diuretic with good response. Reinforced FR. Changed old lena drain dressing on right superior chest. SR 80s, all other VSS. Sternal incision and old ICD site healing. Left upper chest site approximated.  P: Awaiting open bed at ARU.

## 2019-04-02 NOTE — PLAN OF CARE
D: S/p heart transplant on 2/24 c/b ICU delirium and FRANKLIN requiring temporary HD. Hx of NICM, ICD, PAF, UC, ETOH, hypothyroidism, and depression.     I: Monitored vitals and assessed pt status.   Changed: R chest + L hand dressings changed - CDI  Running: R DL Midline Heparin Locked  PRN:    A: A0x4. VSS on RA. Afebrile. SR. Urinating adequately,+ BM today. Denies pain - generalized soreness but no meds given. Good appetite - 2000 mL fluid restriction. Up with assist of 1 and a walker. Med card was updated today and pt + writer went through the list together.     P: Pt will discharge to ARU tomorrow at 1430 - ride set up. Continue to monitor Pt status and report changes to treatment team - CVTS.

## 2019-04-02 NOTE — PROGRESS NOTES
Transplant Social Work Services Progress Note      Date of Initial Social Work Evaluation: 11/16/2018, 1/30/19  Collaborated with:  ARU admissions liaison, bedside RN, Charge RN, CVTS and pt    Data: Pt is s/p heart transplant on 2/24/2019. It was anticipated that pt would discharge today to ARU, however notified by ARU admissions that pt would not be able to go there today due to staffing. Writer cancelled ride and had notified pt of change. Writer received another call from  ARU admissions that they would be able to take pt later today. Writer attempted to arrange ride but unable to secure ride until 4pm, which would be too late for an admission to ARU. Writer has re-scheduled ride for 1430 tomorrow.   Intervention: Discharge Planning, Supportive Counseling    Assessment: Pt is pleasant and understanding around change in discharge for today.   Education provided by SW: Ongoing SW involvement, Discharge Planning   Plan:    Discharge Plans in Progress: ARU tomorrow at 1430.     Barriers to d/c plan: Bed availability at ARU     Follow up Plan: SW to continue to follow.

## 2019-04-02 NOTE — TELEPHONE ENCOUNTER
Donor network  is looking for an update on the pt's status to give the receipts family an update. She has faxed forms into the office but has not heard back. Please connect with the  for a verbal update. They would like this update today

## 2019-04-02 NOTE — PROGRESS NOTES
CLINICAL NUTRITION SERVICES - DISCHARGE NOTE    Patient s discharge needs assessed and discharge planning has been conducted with the multidisciplinary transplant care team including physicians, pharmacy, social work and transplant coordinator.    Follow up/Monitoring:  Once discharged, place outpatient nutrition consult via the transplant team if nutrition concerns arise.     Jannie Guerrero, MS, RD, LD, [facility]   6C Pgr: [phone]

## 2019-04-02 NOTE — PROGRESS NOTES
CLINICAL NUTRITION SERVICES    Reason for Assessment:  Post-Tx nutrition education    Diet History:  Per pt, has not received formal nutrition education on post-Tx guidelines in the recent past. Pt states he is aware of many of the food safety aspects.    Nutrition Diagnosis:  Food- and nutrition-related knowledge deficit r/t no recent post-Tx nutrition education AEB pt report of not receiving nutrition education on post-Tx diet guidelines in the recent past.      Interventions:  Nutrition Education (pt)  1. Provided verbal and written nutrition education on post-Tx nutrition guidelines. Nutrition education included need for increased protein and kcal needs post-op, food safety, and heart-healthy eating in the long-term. Rec to limit excessive sodium intake and gave examples of foods to limit. Pt with no further questions at this time.  2. Handout given: Guide to Your Diet after Transplant and Food Safety for Transplant Recipients booklet from the [place] [employer].    Nutrition instructions for discharge were entered.      Goals:   Patient will verbalize at least three nutrition-related aspects of post-Tx diet guidelines.    Follow-up:    Patient to ask any further nutrition-related questions before discharge. In addition, pt may request outpatient RD appointment.    Jannie Guerrero, MS, RD, LD, [facility]   6C Pgr:  [phone]

## 2019-04-02 NOTE — TELEPHONE ENCOUNTER
LM for Lucinda Zepeda at [facility] that pt is doing well, heart function is WNL and plans for discharge to rehab soon. Asked that Lucinda return call if any questions.

## 2019-04-02 NOTE — PROGRESS NOTES
CVTS Daily Note  4/2/2019  Attending: Dr House    S:   States that pain is being controlled. Denies any hallucinations.  Breathing is improving. Working with both IS and flutter valve.  States he is coughing up less sputum.  Appetite has picked up. Denies any nausea. +BM this am  Denies any popping/clicking in his breast bone.  Working with therapy. States that he is still quite weak.  Feels good about being able to transition to ARU.  Still has swelling in his lower extremities.  Was able to get some sleep last night.    O:   Vitals:    04/01/19 1600 04/01/19 1941 04/02/19 0020 04/02/19 0400   BP: (!) 126/91 (!) 131/96 115/78 133/90   BP Location: Left arm Left arm  Left arm   Pulse:       Resp: 18 18 16 16   Temp: 98.2  F (36.8  C) 98.1  F (36.7  C) 98.3  F (36.8  C) 98  F (36.7  C)   TempSrc: Oral Oral     SpO2: 100% 98% 98% 98%   Weight:    54.9 kg (121 lb)   Height:         Vitals:    03/31/19 0307 04/01/19 0040 04/02/19 0400   Weight: 57 kg (125 lb 11.2 oz) 56 kg (123 lb 8 oz) 54.9 kg (121 lb)     MAPs: 85 - 107   Gen: lying in bed, comfortable, -O2  CV: RRR, telemetry SR 80s, +edema in feet  Pulm: CTA  Abd: BS+, NT/ND  Incision: sternal incision D/I    Labs:  BMP  Recent Labs   Lab 04/02/19  0514 04/01/19  0653 03/31/19  0551 03/30/19 1928    134 135 135   POTASSIUM 4.1 4.6 3.6 3.4   CHLORIDE 104 104 104 100   RADAMES 7.7* 8.2* 7.8* 7.8*   CO2 21 19* 17* 20   BUN 50* 53* 61* 63*   CR 1.85* 2.11* 2.19* 2.39*   GLC 96 86 126* 105*     CBC  Recent Labs   Lab 04/02/19  0514 04/01/19  0653 03/31/19  0551 03/30/19  0531   WBC 5.9 7.9 6.5 5.2   RBC 2.60* 2.81* 2.49* 2.56*   HGB 7.9* 8.4* 7.6* 7.7*   HCT 25.7* 28.1* 24.5* 25.0*   MCV 99 100 98 98   MCH 30.4 29.9 30.5 30.1   MCHC 30.7* 29.9* 31.0* 30.8*   RDW 18.6* 18.9* 18.7* 19.1*    299 280 260     INR  Recent Labs   Lab 03/27/19  0521   INR 1.39*      Hepatic Panel   Lab Results   Component Value Date    AST 16 03/26/2019     Lab Results   Component  Value Date    ALT 23 03/26/2019     Lab Results   Component Value Date    ALBUMIN 2.4 03/26/2019     GLUCOSE:   Recent Labs   Lab 04/02/19  0514 04/01/19  0653 03/31/19  0551 03/30/19  1928 03/30/19  0531 03/29/19  0430 03/29/19  0211 03/28/19  2211 03/28/19  1732 03/28/19  1208 03/28/19  0752  03/28/19  0134   GLC 96 86 126* 105* 101* 94  --   --   --   --   --    < >  --    BGM  --   --   --   --   --   --  99 140* 209* 82 137*  --  185*    < > = values in this interval not displayed.       Imaging:  reviewed recent imaging      A/P:  Everton Larios is a 53 year old male with PMH etoh abuse, hypothyroidism, intermittent asthma, ulcerative colitis, Depression, Chronic HFrEF, NICM admitted with cardiogenic shock requiring subclavian balloon pump. He is now s/p OHT 2/24/19 with Piyush House and Christopher.    - RHC and biopsy 3/4;   0R, no AMR  - RHC and biopsy 3/11; 0R, no AMR  - RHC and biopsy 3/18; 1R, no AMR   - RHC and biopsy 3/25;  0R, biopsy suggestive of AMR.   - RHC and biopsy 3/28;  0R, no AMR      Neuro:   - Neuro intact. ICU delirium improving. Head CT 3/9 without acute pathology.   - Hx Depression: pta cymbalta  - Sleep: melatonin   - Tylenol and oxycodone PRN for pain      CV:   - Hx of NICM, now S/P heart transplant 2/24. No arrhythmia, HD stable.  - ASA 81 mg, Crestor. Sildenafil 20 mg TID.   - Immunosuppression per cardiology   - RHC 3/25 Mild elevated filling pressures. Clinically stable off dobutamine.   - Removed lena drain to right pacer pocket 3/26.   - RHC 3/28/19: RA 9, PA 35/19 (25), PCWP 18, Jaden CO/CI 5.4/3.2  - Possible mild antibody medicated rejection on bx from 3/25, result from 3/28 was no rejection. Cardiology following and with adjust immunosuppression.      Pulm:   - Hx intermittent asthma, pta Singulair.   - Pulm toilet, IS, activity and deep breathing   - Supplemental O2 PRN to keep sats > 92%. Wean off as tolerated.  - LLL consolidation, albuterol and mucomyst Nebs today x 2,  assess response.   - Sputum culture showing Klebsiella- zosyn started 3/21, changed to bactrim with sensitivities, ID recommended Bactrim for 10 days. Starting on 4/5 will decrease bactrim dose to 400/80 mg daily on Tuesday/Thursday/Saturday.     ID:   - WBC 5.9, afebrile, no signs or symptoms of infection other than SOB. Completed 7 days cefepime/zosyn 3/16.   - Blood cultures 3/13: NGTD. C diff negative 3/17.    - Sputum culture positive 3/20, bactrim, ID consulted, continue bactrim for klebsiella pneumonia. Discuss duration with ID.       GI / FEN:  - Reg diet, bowel regimen. Calorie counts. Na 134  - Tube feeds were off 3/21 for nausea. New formula 3/22 pm. - removed NJ and stopped tube feeding 3/25, patient refusing TF since 3/20.   - Hx ulcerative colitis; pta mesalamine. Stool occult negative 3/20.  - Increased PPI to bid for GERD sx, reports having been diagnoses with Pierce's esophagus in the past.   - RUQ abdominal pain: RUQ US 3/26 showing mild gallbladder wall thickening with biliary sludge. No classic biliary symptoms, no fevers or leukocytosis. Biliary dyskinesia?      Renal / :   - Creatinine 1.85, improving, PO Bumex.   - Nephrology following for oliguria and T/Th/Sa hemodialysis. Unable to get tunneled dialysis catheter due to lena drain to old right pacer pocket. RIJ dialysis catheter no longer functional and dialysis unable to be performed 3/21. Patient is showing signs of renal recovery but still not enough to go completely without dialysis. RIJ dialysis catheter replaced on 3/22 and removed on 2/29.   - Off dialysis since 3/23, electrolytes stable. Nephrology believes he will no longer need dialysis. Bumex challenge 3 IV 3/26 with 1.1L UOP; 3/27, 875 of U/o. UA clean 3/26.  - Cardiology following for diuresis. Planning Bumex 4 BID with 40 mEq K TID      Heme / Anticoagulation:  - Got 1 unit PRBC for Hgb 6.9 on 3/24, hgb 7.9, stable. No signs of bleeding.   - Will need coumadin eventually for  SVC graft per Dr House. Will need to discuss with Cardiology, may prefer Apixaban due to frequent biopsies.    - No biopsies or central line access on left due to SVC graft.   - Holding Hep gtt since 3/18. Has R chest hematoma shouldn't interfere with RIGHT chest HD tunneled catheter placement. Chest CT 3/19 confirms R chest hematoma at old AICD site, s/p drainage in OR 3/21.      Endo:   - Hypothyroid: levothyroxine 100 mcg daily   - Sliding corrective scale qid      PPX:   - Protonix     Dispo:   - 6C since 3/16  - Medically stable for discharge since Friday 3/29  - ARU likely today.     Encouraged IS/TCDB/amb. Sternal precautions. Awaiting tacro level. Ok to discharge to ARU from surgery stand point. Will d/w cards.continue to monitor.      Jose Casarez PA-C  [phone]

## 2019-04-03 ENCOUNTER — HOSPITAL ENCOUNTER (INPATIENT)
Facility: CLINIC | Age: 54
LOS: 9 days | Discharge: HOME | DRG: 949 | End: 2019-04-12
Attending: PHYSICAL MEDICINE & REHABILITATION | Admitting: PHYSICAL MEDICINE & REHABILITATION
Payer: COMMERCIAL

## 2019-04-03 ENCOUNTER — RECORDS - HEALTHEAST (OUTPATIENT)
Dept: ADMINISTRATIVE | Facility: OTHER | Age: 54
End: 2019-04-03

## 2019-04-03 ENCOUNTER — APPOINTMENT (OUTPATIENT)
Dept: OCCUPATIONAL THERAPY | Facility: CLINIC | Age: 54
DRG: 001 | End: 2019-04-03
Attending: INTERNAL MEDICINE
Payer: COMMERCIAL

## 2019-04-03 VITALS
TEMPERATURE: 98.3 F | SYSTOLIC BLOOD PRESSURE: 126 MMHG | DIASTOLIC BLOOD PRESSURE: 82 MMHG | OXYGEN SATURATION: 100 % | BODY MASS INDEX: 17.99 KG/M2 | RESPIRATION RATE: 16 BRPM | HEIGHT: 68 IN | HEART RATE: 91 BPM | WEIGHT: 118.7 LBS

## 2019-04-03 DIAGNOSIS — Z94.1 S/P ORTHOTOPIC HEART TRANSPLANT (H): ICD-10-CM

## 2019-04-03 DIAGNOSIS — Z94.1 HEART REPLACED BY TRANSPLANT (H): Primary | ICD-10-CM

## 2019-04-03 PROBLEM — R53.81 DEBILITY: Status: ACTIVE | Noted: 2019-04-03

## 2019-04-03 PROCEDURE — 97110 THERAPEUTIC EXERCISES: CPT | Mod: GO

## 2019-04-03 PROCEDURE — 25000132 ZZH RX MED GY IP 250 OP 250 PS 637: Performed by: NURSE PRACTITIONER

## 2019-04-03 PROCEDURE — 25000132 ZZH RX MED GY IP 250 OP 250 PS 637: Performed by: PHYSICIAN ASSISTANT

## 2019-04-03 PROCEDURE — 40000802 ZZH SITE CHECK

## 2019-04-03 PROCEDURE — 25000132 ZZH RX MED GY IP 250 OP 250 PS 637: Performed by: STUDENT IN AN ORGANIZED HEALTH CARE EDUCATION/TRAINING PROGRAM

## 2019-04-03 PROCEDURE — 25000131 ZZH RX MED GY IP 250 OP 636 PS 637: Performed by: PHYSICIAN ASSISTANT

## 2019-04-03 PROCEDURE — 25000131 ZZH RX MED GY IP 250 OP 636 PS 637: Performed by: NURSE PRACTITIONER

## 2019-04-03 PROCEDURE — 12800006 ZZH R&B REHAB

## 2019-04-03 PROCEDURE — 97535 SELF CARE MNGMENT TRAINING: CPT | Mod: GO

## 2019-04-03 PROCEDURE — 99232 SBSQ HOSP IP/OBS MODERATE 35: CPT | Performed by: NURSE PRACTITIONER

## 2019-04-03 PROCEDURE — 25000131 ZZH RX MED GY IP 250 OP 636 PS 637: Performed by: HOSPITALIST

## 2019-04-03 PROCEDURE — 25000125 ZZHC RX 250: Performed by: NURSE PRACTITIONER

## 2019-04-03 PROCEDURE — 25000132 ZZH RX MED GY IP 250 OP 250 PS 637: Performed by: INTERNAL MEDICINE

## 2019-04-03 RX ORDER — SULFAMETHOXAZOLE/TRIMETHOPRIM 800-160 MG
1 TABLET ORAL DAILY
Status: COMPLETED | OUTPATIENT
Start: 2019-04-04 | End: 2019-04-04

## 2019-04-03 RX ORDER — DULOXETIN HYDROCHLORIDE 20 MG/1
20 CAPSULE, DELAYED RELEASE ORAL DAILY
Status: DISCONTINUED | OUTPATIENT
Start: 2019-04-04 | End: 2019-04-12 | Stop reason: HOSPADM

## 2019-04-03 RX ORDER — TACROLIMUS 1 MG/1
3 CAPSULE ORAL
Status: DISCONTINUED | OUTPATIENT
Start: 2019-04-03 | End: 2019-04-08

## 2019-04-03 RX ORDER — POLYETHYLENE GLYCOL 3350 17 G/17G
17 POWDER, FOR SOLUTION ORAL DAILY PRN
Status: DISCONTINUED | OUTPATIENT
Start: 2019-04-03 | End: 2019-04-12 | Stop reason: HOSPADM

## 2019-04-03 RX ORDER — POTASSIUM CHLORIDE 750 MG/1
40 TABLET, EXTENDED RELEASE ORAL 3 TIMES DAILY
Status: DISCONTINUED | OUTPATIENT
Start: 2019-04-03 | End: 2019-04-08

## 2019-04-03 RX ORDER — MONTELUKAST SODIUM 10 MG/1
10 TABLET ORAL AT BEDTIME
Status: DISCONTINUED | OUTPATIENT
Start: 2019-04-03 | End: 2019-04-12 | Stop reason: HOSPADM

## 2019-04-03 RX ORDER — PREDNISONE 10 MG/1
10 TABLET ORAL DAILY
Status: DISCONTINUED | OUTPATIENT
Start: 2019-04-04 | End: 2019-04-12 | Stop reason: HOSPADM

## 2019-04-03 RX ORDER — ALBUTEROL SULFATE 90 UG/1
2 AEROSOL, METERED RESPIRATORY (INHALATION) EVERY 6 HOURS PRN
Status: DISCONTINUED | OUTPATIENT
Start: 2019-04-03 | End: 2019-04-12 | Stop reason: HOSPADM

## 2019-04-03 RX ORDER — MESALAMINE 800 MG/1
2400 TABLET, DELAYED RELEASE ORAL 2 TIMES DAILY
Status: DISCONTINUED | OUTPATIENT
Start: 2019-04-03 | End: 2019-04-12 | Stop reason: HOSPADM

## 2019-04-03 RX ORDER — NYSTATIN 100000/ML
500000 SUSPENSION, ORAL (FINAL DOSE FORM) ORAL 4 TIMES DAILY
Status: DISCONTINUED | OUTPATIENT
Start: 2019-04-03 | End: 2019-04-12 | Stop reason: HOSPADM

## 2019-04-03 RX ORDER — LEVOTHYROXINE SODIUM 100 UG/1
100 TABLET ORAL DAILY
Status: DISCONTINUED | OUTPATIENT
Start: 2019-04-04 | End: 2019-04-12 | Stop reason: HOSPADM

## 2019-04-03 RX ORDER — AMOXICILLIN 250 MG
1 CAPSULE ORAL 2 TIMES DAILY
Status: DISCONTINUED | OUTPATIENT
Start: 2019-04-04 | End: 2019-04-04

## 2019-04-03 RX ORDER — PREDNISONE 10 MG/1
10 TABLET ORAL EVERY EVENING
Status: DISCONTINUED | OUTPATIENT
Start: 2019-04-03 | End: 2019-04-04

## 2019-04-03 RX ORDER — MULTIPLE VITAMINS W/ MINERALS TAB 9MG-400MCG
1 TAB ORAL DAILY
Status: DISCONTINUED | OUTPATIENT
Start: 2019-04-04 | End: 2019-04-12 | Stop reason: HOSPADM

## 2019-04-03 RX ORDER — ROSUVASTATIN CALCIUM 10 MG/1
10 TABLET, COATED ORAL DAILY
Status: DISCONTINUED | OUTPATIENT
Start: 2019-04-04 | End: 2019-04-12 | Stop reason: HOSPADM

## 2019-04-03 RX ORDER — BUMETANIDE 2 MG/1
4 TABLET ORAL
Status: DISCONTINUED | OUTPATIENT
Start: 2019-04-03 | End: 2019-04-04

## 2019-04-03 RX ORDER — VALGANCICLOVIR 450 MG/1
450 TABLET, FILM COATED ORAL EVERY OTHER DAY
Status: DISCONTINUED | OUTPATIENT
Start: 2019-04-04 | End: 2019-04-08

## 2019-04-03 RX ORDER — ALUMINA, MAGNESIA, AND SIMETHICONE 2400; 2400; 240 MG/30ML; MG/30ML; MG/30ML
30 SUSPENSION ORAL EVERY 4 HOURS PRN
Status: DISCONTINUED | OUTPATIENT
Start: 2019-04-03 | End: 2019-04-12 | Stop reason: HOSPADM

## 2019-04-03 RX ORDER — ASPIRIN 81 MG/1
81 TABLET, CHEWABLE ORAL DAILY
Status: DISCONTINUED | OUTPATIENT
Start: 2019-04-04 | End: 2019-04-12 | Stop reason: HOSPADM

## 2019-04-03 RX ORDER — MAGNESIUM OXIDE 400 MG/1
400 TABLET ORAL 2 TIMES DAILY
Status: DISCONTINUED | OUTPATIENT
Start: 2019-04-03 | End: 2019-04-12 | Stop reason: HOSPADM

## 2019-04-03 RX ORDER — MYCOPHENOLATE MOFETIL 500 MG/1
1500 TABLET ORAL
Status: DISCONTINUED | OUTPATIENT
Start: 2019-04-03 | End: 2019-04-12 | Stop reason: HOSPADM

## 2019-04-03 RX ORDER — ACETAMINOPHEN 325 MG/1
650 TABLET ORAL EVERY 4 HOURS PRN
Status: DISCONTINUED | OUTPATIENT
Start: 2019-04-03 | End: 2019-04-12 | Stop reason: HOSPADM

## 2019-04-03 RX ORDER — LANOLIN ALCOHOL/MO/W.PET/CERES
3-9 CREAM (GRAM) TOPICAL AT BEDTIME
Status: DISCONTINUED | OUTPATIENT
Start: 2019-04-03 | End: 2019-04-12 | Stop reason: HOSPADM

## 2019-04-03 RX ORDER — NALOXONE HYDROCHLORIDE 0.4 MG/ML
.1-.4 INJECTION, SOLUTION INTRAMUSCULAR; INTRAVENOUS; SUBCUTANEOUS
Status: DISCONTINUED | OUTPATIENT
Start: 2019-04-03 | End: 2019-04-12 | Stop reason: HOSPADM

## 2019-04-03 RX ORDER — CALCIUM CARBONATE 500 MG/1
500 TABLET, CHEWABLE ORAL DAILY PRN
Status: DISCONTINUED | OUTPATIENT
Start: 2019-04-03 | End: 2019-04-12 | Stop reason: HOSPADM

## 2019-04-03 RX ORDER — AZELASTINE 1 MG/ML
2 SPRAY, METERED NASAL 2 TIMES DAILY PRN
Status: DISCONTINUED | OUTPATIENT
Start: 2019-04-03 | End: 2019-04-12 | Stop reason: HOSPADM

## 2019-04-03 RX ORDER — SULFAMETHOXAZOLE AND TRIMETHOPRIM 400; 80 MG/1; MG/1
1 TABLET ORAL DAILY
Status: DISCONTINUED | OUTPATIENT
Start: 2019-04-05 | End: 2019-04-04

## 2019-04-03 RX ADMIN — MULTIPLE VITAMINS W/ MINERALS TAB 1 TABLET: TAB at 08:43

## 2019-04-03 RX ADMIN — LEVOTHYROXINE SODIUM 100 MCG: 100 TABLET ORAL at 08:43

## 2019-04-03 RX ADMIN — MAGNESIUM OXIDE TAB 400 MG (241.3 MG ELEMENTAL MG) 400 MG: 400 (241.3 MG) TAB at 20:51

## 2019-04-03 RX ADMIN — PREDNISONE 10 MG: 10 TABLET ORAL at 08:42

## 2019-04-03 RX ADMIN — PANTOPRAZOLE SODIUM 40 MG: 40 TABLET, DELAYED RELEASE ORAL at 08:43

## 2019-04-03 RX ADMIN — MESALAMINE 2400 MG: 800 TABLET, DELAYED RELEASE ORAL at 20:52

## 2019-04-03 RX ADMIN — BUMETANIDE 4 MG: 2 TABLET ORAL at 08:43

## 2019-04-03 RX ADMIN — MYCOPHENOLATE MOFETIL 1500 MG: 500 TABLET ORAL at 17:18

## 2019-04-03 RX ADMIN — ROSUVASTATIN CALCIUM 10 MG: 10 TABLET, FILM COATED ORAL at 08:43

## 2019-04-03 RX ADMIN — OMEPRAZOLE 40 MG: 20 CAPSULE, DELAYED RELEASE ORAL at 16:33

## 2019-04-03 RX ADMIN — CALCIUM CARBONATE (ANTACID) CHEW TAB 500 MG 500 MG: 500 CHEW TAB at 09:14

## 2019-04-03 RX ADMIN — TACROLIMUS 3 MG: 1 CAPSULE ORAL at 17:18

## 2019-04-03 RX ADMIN — POTASSIUM CHLORIDE 40 MEQ: 750 TABLET, EXTENDED RELEASE ORAL at 08:43

## 2019-04-03 RX ADMIN — MAGNESIUM OXIDE TAB 400 MG (241.3 MG ELEMENTAL MG) 400 MG: 400 (241.3 MG) TAB at 08:43

## 2019-04-03 RX ADMIN — BUMETANIDE 4 MG: 2 TABLET ORAL at 16:33

## 2019-04-03 RX ADMIN — CALCIUM CARBONATE 600 MG (1,500 MG)-VITAMIN D3 400 UNIT TABLET 1 TABLET: at 17:18

## 2019-04-03 RX ADMIN — FLUTICASONE PROPIONATE 2 PUFF: 220 AEROSOL, METERED RESPIRATORY (INHALATION) at 08:47

## 2019-04-03 RX ADMIN — MESALAMINE 2400 MG: 800 TABLET, DELAYED RELEASE ORAL at 08:43

## 2019-04-03 RX ADMIN — ASPIRIN 81 MG CHEWABLE TABLET 81 MG: 81 TABLET CHEWABLE at 08:43

## 2019-04-03 RX ADMIN — PREDNISONE 10 MG: 10 TABLET ORAL at 20:51

## 2019-04-03 RX ADMIN — Medication 500000 UNITS: at 13:58

## 2019-04-03 RX ADMIN — TACROLIMUS 3 MG: 1 CAPSULE ORAL at 08:42

## 2019-04-03 RX ADMIN — Medication 500000 UNITS: at 08:43

## 2019-04-03 RX ADMIN — DULOXETINE 20 MG: 20 CAPSULE, DELAYED RELEASE ORAL at 08:43

## 2019-04-03 RX ADMIN — POTASSIUM CHLORIDE 40 MEQ: 750 TABLET, EXTENDED RELEASE ORAL at 20:51

## 2019-04-03 RX ADMIN — SULFAMETHOXAZOLE AND TRIMETHOPRIM 1 TABLET: 800; 160 TABLET ORAL at 08:43

## 2019-04-03 RX ADMIN — MELATONIN TAB 3 MG 9 MG: 3 TAB at 22:06

## 2019-04-03 RX ADMIN — MYCOPHENOLATE MOFETIL 1500 MG: 500 TABLET, FILM COATED ORAL at 08:42

## 2019-04-03 RX ADMIN — MONTELUKAST SODIUM 10 MG: 10 TABLET, COATED ORAL at 20:51

## 2019-04-03 RX ADMIN — Medication 1 TABLET: at 08:42

## 2019-04-03 ASSESSMENT — ACTIVITIES OF DAILY LIVING (ADL)
ADLS_ACUITY_SCORE: 12
ADLS_ACUITY_SCORE: 12
ADLS_ACUITY_SCORE: 11
ADLS_ACUITY_SCORE: 12

## 2019-04-03 ASSESSMENT — MIFFLIN-ST. JEOR
SCORE: 1342.92
SCORE: 1341.56

## 2019-04-03 NOTE — Clinical Note
Potential access sites were evaluated for patency using ultrasound.   The right internal jugular vein was selected. Access was obtained under with Sonosite guidance using a standard 18 guage needle with direct visualization of needle entry.

## 2019-04-03 NOTE — IP AVS SNAPSHOT
MRN:[id number]                      After Visit Summary   4/3/2019    Everton Larios    MRN: [id number]           Visit Information        Department      4/3/2019  2:50 PM [address] [facility]          Review of your medicines      UNREVIEWED medicines. Ask your doctor about these medicines       Dose / Directions   acetaminophen 325 MG tablet  Commonly known as:  TYLENOL  Used for:  Heart replaced by transplant (H)      Dose:  650 mg  Take 2 tablets (650 mg) by mouth every 4 hours as needed for mild pain  Refills:  0     albuterol 108 (90 Base) MCG/ACT inhaler  Commonly known as:  PROAIR HFA/PROVENTIL HFA/VENTOLIN HFA  Used for:  Intermittent asthma without complication, unspecified asthma severity      Dose:  2 puff  Inhale 2 puffs into the lungs every 6 hours as needed for shortness of breath / dyspnea or wheezing  Refills:  0     alum & mag hydroxide-simethicone 400-400-40 MG/5ML Susp suspension  Commonly known as:  MYLANTA ES/MAALOX  ES  Used for:  Heart replaced by transplant (H)      Dose:  30 mL  Take 30 mLs by mouth every 4 hours as needed for indigestion  Refills:  0     ASACOL  MG EC tablet  Used for:  Ulcerative colitis with complication, unspecified location (H)  Generic drug:  mesalamine      Dose:  2400 mg  Take 3 tablets (2,400 mg) by mouth 2 times daily  Refills:  0     aspirin 81 MG chewable tablet  Commonly known as:  ASA  Used for:  S/P orthotopic heart transplant (H)      Dose:  81 mg  Take 1 tablet (81 mg) by mouth daily  Refills:  0     azelastine 0.1 % nasal spray  Commonly known as:  ASTELIN  Used for:  Heart replaced by transplant (H)      Dose:  2 spray  Spray 2 sprays into both nostrils 2 times daily as needed for rhinitis or allergies  Refills:  0     BEANO PO      Dose:  2 tablet  Take 2 tablets by mouth 3 times daily  Refills:  0     BETA BLOCKER NOT PRESCRIBED  Commonly known as:  INTENTIONAL  Used for:  S/P orthotopic heart transplant (H)      Beta  Blocker not prescribed intentionally due to Other: Heart Transplant  Refills:  0     bumetanide 2 MG tablet  Commonly known as:  BUMEX  Used for:  S/P orthotopic heart transplant (H)      Dose:  4 mg  Take 2 tablets (4 mg) by mouth 2 times daily  Refills:  0     calcium carbonate 500 MG chewable tablet  Commonly known as:  TUMS  Used for:  Heart replaced by transplant (H)      Dose:  1 chew tab  Take 1 tablet (500 mg) by mouth daily as needed for heartburn  Refills:  0     calcium carbonate 600 mg-vitamin D 400 units 600-400 MG-UNIT per tablet  Commonly known as:  CALTRATE  Used for:  Heart replaced by transplant (H)      Dose:  1 tablet  Take 1 tablet by mouth 2 times daily (with meals)  Refills:  0     DULoxetine 20 MG capsule  Commonly known as:  CYMBALTA      Dose:  20 mg  Take 20 mg by mouth daily  Refills:  0     fluticasone 220 MCG/ACT inhaler  Commonly known as:  FLOVENT HFA      Dose:  2 puff  Inhale 2 puffs into the lungs 2 times daily  Refills:  0     levothyroxine 100 MCG tablet  Commonly known as:  SYNTHROID/LEVOTHROID      Dose:  100 mcg  Take 100 mcg by mouth daily  Refills:  0     magnesium oxide 400 MG tablet  Commonly known as:  MAG-OX  Used for:  Heart replaced by transplant (H)      Dose:  400 mg  Take 1 tablet (400 mg) by mouth daily  Refills:  0     melatonin 3 MG tablet  Used for:  Heart replaced by transplant (H)      Dose:  6 mg  Take 2 tablets (6 mg) by mouth At Bedtime  Refills:  0     multivitamin RENAL 1 MG capsule  Used for:  Heart replaced by transplant (H)      Dose:  1 capsule  Take 1 capsule by mouth daily  Refills:  0     mycophenolate 250 MG capsule  Commonly known as:  GENERIC EQUIVALENT  Used for:  Heart replaced by transplant (H)      Dose:  1500 mg  Take 6 capsules (1,500 mg) by mouth 2 times daily  Refills:  0     nystatin 875682 UNIT/ML suspension  Commonly known as:  MYCOSTATIN  Indication:  Candidiasis Fungal Infection of the Oropharynx  Used for:  Heart replaced by  transplant (H)      Dose:  642581 Units  Take 5 mLs (500,000 Units) by mouth 4 times daily  Refills:  0     omeprazole 40 MG DR capsule  Commonly known as:  priLOSEC  Used for:  Nausea      Dose:  40 mg  Take 1 capsule (40 mg) by mouth 2 times daily (before meals)  Refills:  0     oxyCODONE 5 MG tablet  Commonly known as:  ROXICODONE  Used for:  S/P orthotopic heart transplant (H)      Dose:  2.5 mg  Take 0.5 tablets (2.5 mg) by mouth every 4 hours as needed for moderate to severe pain  Quantity:  10 tablet  Refills:  0     polyethylene glycol packet  Commonly known as:  MIRALAX/GLYCOLAX  Used for:  Heart replaced by transplant (H)      Dose:  17 g  Take 17 g by mouth daily as needed for constipation  Refills:  0     potassium chloride ER 10 MEQ CR tablet  Commonly known as:  K-DUR/KLOR-CON M  Used for:  S/P orthotopic heart transplant (H)      Dose:  20 mEq  Take 2 tablets (20 mEq) by mouth 3 times daily  Refills:  0     * predniSONE 10 MG tablet  Commonly known as:  DELTASONE  Used for:  S/P orthotopic heart transplant (H)      Dose:  10 mg  Take 10 mg by mouth every evening.  Refills:  0     * predniSONE 10 MG tablet  Commonly known as:  DELTASONE  Used for:  S/P orthotopic heart transplant (H)      Dose:  10 mg  Take 10 mg by mouth daily.  Refills:  0     rosuvastatin 10 MG tablet  Commonly known as:  CRESTOR  Used for:  Heart replaced by transplant (H)      Dose:  10 mg  Take 1 tablet (10 mg) by mouth daily  Refills:  0     senna-docusate 8.6-50 MG tablet  Commonly known as:  SENOKOT-S/PERICOLACE  Used for:  Heart replaced by transplant (H)      Dose:  1 tablet  Take 1 tablet by mouth 2 times daily  Refills:  0     SINGULAIR 10 MG tablet  Used for:  Intermittent asthma without complication, unspecified asthma severity  Generic drug:  montelukast      Dose:  10 mg  Take 1 tablet (10 mg) by mouth At Bedtime  Refills:  0     * sulfamethoxazole-trimethoprim 800-160 MG tablet  Commonly known as:  BACTRIM DS/SEPTRA  DS  Indication:  Pneumonia caused by Inhaling a Substance Into the Lungs  Used for:  S/P orthotopic heart transplant (H)  Ask about: Should I take this medication?      Dose:  1 tablet  Take 1 tablet by mouth daily for 2 days  Refills:  0     * sulfamethoxazole-trimethoprim 400-80 MG tablet  Commonly known as:  BACTRIM/SEPTRA  Indication:  Preventative Medication Therapy Used Around Surgery  Used for:  S/P orthotopic heart transplant (H)      Dose:  1 tablet  Take 1 tablet by mouth daily On Tuesdays, Thursdays, and Saturdays starting on 4/5/19.  Refills:  0     tacrolimus 1 MG capsule  Commonly known as:  GENERIC EQUIVALENT  Used for:  S/P orthotopic heart transplant (H)      Dose:  3 mg  Take 3 capsules (3 mg) by mouth 2 times daily  Refills:  0     valGANciclovir 450 MG tablet  Commonly known as:  VALCYTE  Indication:  Medication Treatment to Prevent Cytomegalovirus Disease  Used for:  Heart replaced by transplant (H)      Dose:  450 mg  Take 1 tablet (450 mg) by mouth every other day  Refills:  0     VITAMIN D2 PO      Refills:  0         * This list has 4 medication(s) that are the same as other medications prescribed for you. Read the directions carefully, and ask your doctor or other care provider to review them with you.                  Protect others around you: Learn how to safely use, store and throw away your medicines at www.disposemymeds.org.       Follow-ups after your visit       Your next 10 appointments already scheduled    Apr 10, 2019 11:30 AM CDT  Procedure - 2.5 hour with U2A ROOM 5  [address] [facility] [place] ([facility]) [address] [address]      Apr 10, 2019  Procedure with Isiah Mcallister MD  [facility],  Heart Cath Lab ([facility]) [address] [address]  [phone]   The [facility] is located on the corner of [address] on  the [facility]. It is easily accessible from virtually any point in the [facility] area, via [address].   Apr 24, 2019  8:30 AM CDT  LAB with UU LAB GOLD WAITING  [facility] ([facility]) [address] [address]   Please do not eat 10-12 hours before your appointment if you are coming in fasting for labs on lipids, cholesterol, or glucose (sugar). Does not apply to pregnant women. Water, tea and black coffee (with nothing added) is okay. Do not drink other fluids, diet soda or gum. If you have concerns about taking your medications, please send a message by clicking on Secure Messaging, Message Your Care Team.     Apr 24, 2019  9:00 AM CDT  Procedure - 2.5 hour with U2A ROOM 2  Unit [address]A [facility] ([facility]) [address] [place] [address]      Apr 24, 2019  Procedure with Isiah Mcallister MD  [facility]  [facility] ([facility]) [address] [place] [address] [phone]   The [facility] is located on the corner of [address] on the [facility]. It is easily accessible from virtually any point in the [facility] area, via [address].   Apr 26, 2019  9:00 AM CDT  (Arrive by 8:45 AM)  RETURN HEART TRANSPLANT with Elin Jensen NP  [facility] ([facility]) [address]  [address]  [facility] [id number] [phone]      Apr 26, 2019 10:30 AM CDT  (Arrive by 10:15 AM)  Office Visit with Franko Preciado RPH  [facility] ([facility]) [address]  3rd Floor  [facility] [id number] [phone]   Bring a current list of meds and any records pertaining to this visit.  For Physicals, please bring immunization records and any forms  needing to be filled out. Please arrive 10 minutes early to complete paperwork.     May 08, 2019  8:00 AM CDT  Lab with  LAB  [facility] ([facility]) [address]  1st Floor  [facility] [id number] [phone]      May 08, 2019  8:30 AM CDT  (Arrive by 8:15 AM)  RETURN HEART TRANSPLANT with Corinna Donis MD  [facility] ([facility]) [address]  [address]  [facility] [id number] [phone]      May 08, 2019  9:30 AM CDT  Procedure - 2.5 hour with U2A ROOM 4  [address]A [facility] ([facility] [place]) [address] [address]         Care Instructions       Further instructions from your care team       [facility]                        Interventional Cardiology  Discharge Instructions   Post Right Heart Cath      AFTER YOU GO HOME:    DO drink plenty of fluids    DO resume your regular diet and medications unless otherwise instructed by your Primary Physician    Do Not scrub the procedure site vigorously    No lotion or powder to the puncture site for 3 days    CALL YOUR PRIMARY PHYSICIAN IF: You may resume all normal activity.  Monitor neck site for bleeding, swelling, or voice changes. If you notice bleeding or swelling immediately apply pressure to the site and call number below to speak with Cardiology Fellow.  If you experience any changes in your breathing you should call your doctor immediately or come to the closest Emergency Department.  Do not drive yourself.    ADDITIONAL INSTRUCTIONS: Medications: You are to resume all home medications including anticoagulation therapy unless otherwise advised by your primary cardiologist or nurse coordinator.    Follow Up: Per your primary cardiology team    If you have any questions or concerns regarding your procedure site please call [phone] at anytime and ask for Cardiology Fellow on call.  They  "are available 24 hours a day.  You may also contact the Cardiology Clinic after hours number at [phone].                                                       Telephone Numbers [phone] Monday-Friday 8:00 am to 4:30 pm    [phone] After 4:30 pm Monday-Friday, Weekends & Holidays  Ask for Interventional Cardiologist on call. Someone is on call 24 hours/day   [facility] toll free number [phone] Monday-Friday 8:00 am to 4:30 pm   [facility] Emergency Dept [phone]                   Additional Information About Your Visit       [employer] Information    [employer] gives you secure access to your electronic health record. If you see a primary care provider, you can also send messages to your care team and make appointments. If you have questions, please call your primary care clinic.  If you do not have a primary care provider, please call [phone] and they will assist you.       Care EveryWhere ID    This is your Care EveryWhere ID. This could be used by other organizations to access your [place] medical records  [id number]       Your Vitals Were     Blood Pressure   128/89 (BP Location: Right arm)          Pulse   87          Temperature   97.9  F (36.6  C) (Oral)          Respirations   16          Height   1.727 m (5' 8\")             Weight   52.8 kg (116 lb 6.4 oz)    Pulse Oximetry   100%    BMI (Body Mass Index)   17.70 kg/m           Primary Care Provider Office Phone # Fax #    [facility] [phone]      Equal Access to Services    BELIA GOLDBERG AH: Hadii aad ku hadasho Soomaali, waaxda luqadaha, qaybta kaalmada adeegyada, dean topete. So [facility] [phone].    ATENCIÓN: Si habla español, tiene a anaya disposición servicios gratuitos de asistencia lingüística. Llame al [phone].    We comply with applicable federal civil rights laws and [place] laws. We do not discriminate on the basis of race, color, national origin, age, disability, sex, " sexual orientation, or gender identity.           Thank you!    Thank you for choosing [facility] for your care. Our goal is always to provide you with excellent care. Hearing back from our patients is one way we can continue to improve our services. Please take a few minutes to complete the written survey that you may receive in the mail after you visit with us. Thank you!            Medication List      ASK your doctor about these medications          Morning Afternoon Evening Bedtime As Needed    acetaminophen 325 MG tablet  Also known as:  TYLENOL  INSTRUCTIONS:  Take 2 tablets (650 mg) by mouth every 4 hours as needed for mild pain  LAST TAKEN:  650 mg on 4/9/2019  4:26 PM                     albuterol 108 (90 Base) MCG/ACT inhaler  Also known as:  PROAIR HFA/PROVENTIL HFA/VENTOLIN HFA  INSTRUCTIONS:  Inhale 2 puffs into the lungs every 6 hours as needed for shortness of breath / dyspnea or wheezing                     alum & mag hydroxide-simethicone 400-400-40 MG/5ML Susp suspension  Also known as:  MYLANTA ES/MAALOX  ES  INSTRUCTIONS:  Take 30 mLs by mouth every 4 hours as needed for indigestion                     ASACOL  MG EC tablet  INSTRUCTIONS:  Take 3 tablets (2,400 mg) by mouth 2 times daily  LAST TAKEN:  2,400 mg on 4/10/2019  8:14 AM  Generic drug:  mesalamine                     aspirin 81 MG chewable tablet  Also known as:  ASA  INSTRUCTIONS:  Take 1 tablet (81 mg) by mouth daily  LAST TAKEN:  81 mg on 4/10/2019  8:14 AM                     azelastine 0.1 % nasal spray  Also known as:  ASTELIN  INSTRUCTIONS:  Spray 2 sprays into both nostrils 2 times daily as needed for rhinitis or allergies                     BEANO PO  INSTRUCTIONS:  Take 2 tablets by mouth 3 times daily                     BETA BLOCKER NOT PRESCRIBED  Also known as:  INTENTIONAL  INSTRUCTIONS:  Beta Blocker not prescribed intentionally due to Other: Heart Transplant                     bumetanide 2 MG tablet  Also known  as:  BUMEX  INSTRUCTIONS:  Take 2 tablets (4 mg) by mouth 2 times daily  LAST TAKEN:  2 mg on 4/10/2019  8:15 AM                     calcium carbonate 500 MG chewable tablet  Also known as:  TUMS  INSTRUCTIONS:  Take 1 tablet (500 mg) by mouth daily as needed for heartburn                     calcium carbonate 600 mg-vitamin D 400 units 600-400 MG-UNIT per tablet  Also known as:  CALTRATE  INSTRUCTIONS:  Take 1 tablet by mouth 2 times daily (with meals)  LAST TAKEN:  1 tablet on 4/10/2019  8:14 AM                     DULoxetine 20 MG capsule  Also known as:  CYMBALTA  INSTRUCTIONS:  Take 20 mg by mouth daily  LAST TAKEN:  20 mg on 4/10/2019  8:15 AM                     fluticasone 220 MCG/ACT inhaler  Also known as:  FLOVENT HFA  INSTRUCTIONS:  Inhale 2 puffs into the lungs 2 times daily  LAST TAKEN:  Ask your nurse or doctor                     levothyroxine 100 MCG tablet  Also known as:  SYNTHROID/LEVOTHROID  INSTRUCTIONS:  Take 100 mcg by mouth daily  LAST TAKEN:  100 mcg on 4/10/2019  8:14 AM                     magnesium oxide 400 MG tablet  Also known as:  MAG-OX  INSTRUCTIONS:  Take 1 tablet (400 mg) by mouth daily  LAST TAKEN:  400 mg on 4/10/2019  8:14 AM                     melatonin 3 MG tablet  INSTRUCTIONS:  Take 2 tablets (6 mg) by mouth At Bedtime  LAST TAKEN:  9 mg on 4/9/2019  8:29 PM                     multivitamin RENAL 1 MG capsule  INSTRUCTIONS:  Take 1 capsule by mouth daily                     mycophenolate 250 MG capsule  Also known as:  GENERIC EQUIVALENT  INSTRUCTIONS:  Take 6 capsules (1,500 mg) by mouth 2 times daily  LAST TAKEN:  Ask your nurse or doctor                     nystatin 553754 UNIT/ML suspension  Also known as:  MYCOSTATIN  INSTRUCTIONS:  Take 5 mLs (500,000 Units) by mouth 4 times daily  Reason for med:  Candidiasis Fungal Infection of the Oropharynx  LAST TAKEN:  500,000 Units on 4/10/2019  8:15 AM                     omeprazole 40 MG DR capsule  Also known as:   priLOSEC  INSTRUCTIONS:  Take 1 capsule (40 mg) by mouth 2 times daily (before meals)  LAST TAKEN:  40 mg on 4/10/2019  8:14 AM                     oxyCODONE 5 MG tablet  Also known as:  ROXICODONE  INSTRUCTIONS:  Take 0.5 tablets (2.5 mg) by mouth every 4 hours as needed for moderate to severe pain                     polyethylene glycol packet  Also known as:  MIRALAX/GLYCOLAX  INSTRUCTIONS:  Take 17 g by mouth daily as needed for constipation                     potassium chloride ER 10 MEQ CR tablet  Also known as:  K-DUR/KLOR-CON M  INSTRUCTIONS:  Take 2 tablets (20 mEq) by mouth 3 times daily  LAST TAKEN:  40 mEq on 4/9/2019  8:29 PM                     * predniSONE 10 MG tablet  Also known as:  DELTASONE  INSTRUCTIONS:  Take 10 mg by mouth every evening.  Doctor's comments:  Please include the quantity of tablets and (strength) per dose in sig.  LAST TAKEN:  Ask your nurse or doctor                     * predniSONE 10 MG tablet  Also known as:  DELTASONE  INSTRUCTIONS:  Take 10 mg by mouth daily.  Doctor's comments:  Please include the quantity of tablets and (strength) per dose in sig.  LAST TAKEN:  Ask your nurse or doctor                     rosuvastatin 10 MG tablet  Also known as:  CRESTOR  INSTRUCTIONS:  Take 1 tablet (10 mg) by mouth daily  LAST TAKEN:  10 mg on 4/10/2019  8:15 AM                     senna-docusate 8.6-50 MG tablet  Also known as:  SENOKOT-S/PERICOLACE  INSTRUCTIONS:  Take 1 tablet by mouth 2 times daily                     SINGULAIR 10 MG tablet  INSTRUCTIONS:  Take 1 tablet (10 mg) by mouth At Bedtime  LAST TAKEN:  10 mg on 4/9/2019  8:29 PM  Generic drug:  montelukast                     * sulfamethoxazole-trimethoprim 800-160 MG tablet  Also known as:  BACTRIM DS/SEPTRA DS  INSTRUCTIONS:  Take 1 tablet by mouth daily for 2 days  Reason for med:  Pneumonia caused by Inhaling a Substance Into the Lungs  LAST TAKEN:  Ask your nurse or doctor  Ask about: Should I take this  medication?                     * sulfamethoxazole-trimethoprim 400-80 MG tablet  Also known as:  BACTRIM/SEPTRA  INSTRUCTIONS:  Take 1 tablet by mouth daily On Tuesdays, Thursdays, and Saturdays starting on 4/5/19.  Reason for med:  Preventative Medication Therapy Used Around Surgery  LAST TAKEN:  Ask your nurse or doctor                     tacrolimus 1 MG capsule  Also known as:  GENERIC EQUIVALENT  INSTRUCTIONS:  Take 3 capsules (3 mg) by mouth 2 times daily  LAST TAKEN:  3 mg on 4/10/2019  8:14 AM                     valGANciclovir 450 MG tablet  Also known as:  VALCYTE  INSTRUCTIONS:  Take 1 tablet (450 mg) by mouth every other day  Reason for med:  Medication Treatment to Prevent Cytomegalovirus Disease  LAST TAKEN:  900 mg on 4/10/2019  8:14 AM                     VITAMIN D2 PO                        * This list has 4 medication(s) that are the same as other medications prescribed for you. Read the directions carefully, and ask your doctor or other care provider to review them with you.

## 2019-04-03 NOTE — PLAN OF CARE
Physical Therapy Discharge Summary    Reason for therapy discharge:    Discharged to acute rehabilitation facility.    Progress towards therapy goal(s). See goals on Care Plan in [facility] electronic health record for goal details.  Goals not met.  Barriers to achieving goals:   discharge from facility.    Therapy recommendation(s):    Continued therapy is recommended.  Rationale/Recommendations:  for progression of strength, balance, activity tolerance, and IND/safety with functional mobility.

## 2019-04-03 NOTE — Clinical Note
dry, intact, no bleeding and no hematoma. 7 Fr sheath removed from the RIJ vein. Manual pressure held. Hemostasis obtained. Band aid applied.

## 2019-04-03 NOTE — DISCHARGE INSTRUCTIONS
[facility]      AFTER YOU GO HOME FROM YOUR HEART SURGERY    You had a sternotomy, avoid lifting anything greater than ten pounds for 6 weeks after surgery and then less than 20 pounds for an additional 6 weeks.    No driving for 4 weeks after surgery or while on pain medication. If you had a minimally invasive procedure, you may drive after 2 weeks if not taking pain medication.      Avoid strenuous activities such as bowling, vacuuming, raking, shoveling, golf or tennis for 12 weeks after your surgery. It is okay to resume sex if you feel comfortable in doing so. You may have to try different positions with your partner.     Splint your chest incision by hugging a pillow or bringing your arms across your chest when coughing or sneezing. Avoid pushing off with your arms when getting up for the first month if you have had your sternum opened.    Shower or wash your incisions daily with soap and water (or as instructed), pat dry. Keep wound clean and dry, showers are okay after discharge, but don't let spray hit directly on incision. No baths or swimming for 1 month. Cover chest tube sites with gauze until they stop draining, then leave open to air. It is not abnormal for chest tube sites to drain yellowish/clear fluid for up to 2-3 weeks after surgery.     Watch for signs of infection: increased redness, tenderness, warmth or any drainage that appears infected (pus like) or is persistent.  Also a temperature > 100.5 F or chills. Call your surgeon or primary care provider's office immediately. Remove any skin glue left on incisions after 10-14 days. This will not affect your incision and can speed up healing.    Exercise is very important in your recovery. Please follow the guidelines set up for you in your cardiac rehab classes at the hospital. If outpatient cardiac rehab was ordered for you, we highly recommend you participate. If you have problems arranging your cardiac rehab, please  call [phone].     Avoid sitting for prolonged periods of time, try to walk every hour during the day. If you have a leg incision, elevate your leg often when you are not walking.    Check your weight when you get home from the hospital and continue to check it daily through your recovery for at least a month. If you notice a weight gain of 2-3 pounds in a week, notify your primary care physician, cardiologist or surgeon.    Bowel activity may be slow after surgery. If necessary, you may take an over the counter laxative such as Milk of Magnesia or Miralax. You may have stool softeners prescribed (docusate sodium, Senokot). We recommend using stool softeners while using narcotics for pain (oxycodone/percocet, hydrocodone/vicodin, hydromorphone/dilaudid).      Wean OFF of narcotics (oxycodone, dilaudid, hydrocodone) as soon as possible. You should continue taking acetaminophen as long as you have any surgical pain as the first choice for pain control and add narcotics as necessary for pain to be tolerable.      DO NOT SMOKE.  IF YOU NEED HELP QUITTING, PLEASE TALK WITH YOUR CARDIOLOGIST OR PRIMARY DOCTOR.    You had a heart transplant, so call your Transplant Coordinator with all questions or concerns.     REGARDING PRESCRIPTION REFILLS.  If you need a refill on your pain medication contact us to discuss your pain and a possible one time refill.   All other medications will be adjusted, discontinued and re-filled by your primary care physician and/or your cardiologist as they were prior to your surgery. We have given you enough for one to three month with possibly one refill.    POST-OPERATIVE CLINIC VISITS  Your heart transplant coordinator will set up and manage your follow up care. Please call them with any questions/concerns you have.    Nutrition  Diet: Regular diet as tolerated, or as recommended by your team/doctors. Avoid excessive sodium intake.   *Post-Transplant Diet Guidelines - Follow recommendations  on the Guide to Your Diet after Transplant handout (summary below).    -  Maintain a healthy weight.  -  Eat a heart-healthy diet (low sodium, low saturated and trans-fat) once your appetite improves to normal.  -  Control blood sugar.  -  Limit sodium (salt).  -  Take calcium and vitamin D supplement if your doctor or team orders.  -  Eat more protein for six to eight weeks after transplant.    -  Prevent food poisoning, store and prepare foods to the proper temperature, practice good handwashing, heat all deli meat (to 165 degrees Fahrenheit), avoid raw fish and meats (including uncooked eggs, non-pasteurized or raw milk, and non-pasteurized or raw cheeses including brie, camembert, blue-veined cheese, and Mexican queso fresco), and throw out leftovers older than two days.   -  In some cases (but not in all cases), adjust potassium intake.

## 2019-04-03 NOTE — IP AVS SNAPSHOT
[address] [address]                                    After Visit Summary   4/3/2019    Everton Larios    MRN: [id number]           After Visit Summary Signature Page    I have received my discharge instructions, and my questions have been answered. I have discussed any challenges I see with this plan with the nurse or doctor.    ..........................................................................................................................................  Patient/Patient Representative Signature      ..........................................................................................................................................  Patient Representative Print Name and Relationship to Patient    ..................................................               ................................................  Date                                   Time    ..........................................................................................................................................  Reviewed by Signature/Title    ...................................................              ..............................................  Date                                               Time          22EPIC Rev 08/18

## 2019-04-03 NOTE — PLAN OF CARE
Shift: [id number]  Patient is s/p heart transplant 2/24    Neuro: A&O x4, uses call light appropriately.  VS: Afebrile, BP WNL.   Pain: Generalized soreness, denies intervention.  Cardiac: NSR 80s.  Respiratory: Clear lung sounds, on room air.   GI/: Frequent voiding overnight. No BM - patient refused stool softeners.  Diet/Appetite: 2gm Na diet with 2L fluid restriction  LDA's: RUE midline - heparin locked.  Skin: All incisions CDI, healing/scabbing.  Activity: Assist of one from sitting to standing then SBA        Plan: Discharge to ARU at 1430 today. Contact primary team with any changes, questions or concerns.

## 2019-04-03 NOTE — H&P
[facility]                HISTORY AND PHYSICAL NOTE         Patient Name: Everton Larios   YOB: 1965  MRN: [id number]     Age / Sex: 54 year old male    Admit Date: 04/03/19    Reason for Admission: intense rehabilitation for debility in the setting of heart transplant for nonischemic cardiomyopathy    History of Present Illness  Everton Larios is a 54 year old male who is being admitted to the ARU on 04/03/19  He is right-handed gentleman with a past medical history significant for nonischemic cardiomyopathy with an EF of 15% status post ICD placement in 2008, congenital ASD status post repair in childhood, paroxysmal atrial fibrillation status post ablation and cardioversion, who was admitted to the [facility] on 2-17- 2019 with heart failure.  As a suitable donor became available, he underwent orthotopic heart transplant by Dr. Ben White.     Postoperative course has been significant for delirium, encephalopathy, need for pressors, oliguric AK I requiring CRRT then transitioned to hemodialysis on 14 March.    He developed septic shock on the ninth and was started on vancomycin and Zosyn in 14 March.  Other issues included hematoma at the site of his prior AICD mandating the withholding of heparin.    His head CT has been without any acute pathology  He was started on Cymbalta for depression  He is on immunosuppression as well as aspirin sildenafil, biopsy done on 28 March showed no rejection    Other issues include HCAP, sputum positive for Klebsiella treated with Zosyn changed to Bactrim for sensitivities.  He is currently on back room for 10 days ending on 5 April.    Other issues include right chest wall hematoma at the AICD site which has been drained on 21 March.    Functionally, the patient states that he was independent with all aspects of mobility and ADLs, he did not use an assistive device, however he  was walking very short distances which had been worsening in the last 2-3 years      Currently, the patient is medically appropriate and is assessed to have needs and will benefit from an inpatient acute rehabilitation comprehensive program working with Physical and Occupational Therapist and will benefit from supervision and management of Rehab Nursing and Rehab MD.     Allergies  Allergies   Allergen Reactions     Hydromorphone Other (See Comments)     Significant Delirium     Lisinopril Other (See Comments)     hypotension     Codeine Nausea       Past Medical and Surgical History  Past Medical History:   Diagnosis Date     Alcohol abuse      Pierce's esophagus 10/4/2018     Chronic rhinitis 10/4/2018     Chronic systolic heart failure (H) 10/4/2018     Depression 10/4/2018     Diastolic dysfunction      DJD (degenerative joint disease) - neck 10/4/2018     H/O congenital atrial septal defect (ASD) repair at age 3 10/4/2018     Hypothyroidism due to medication 10/4/2018     ICD, Betyah 2008; gen change 2/2018 10/4/2018     Intermittent asthma without complication 10/4/2018     Nonischemic cardiomyopathy (H) 10/4/2018     On amiodarone therapy 10/4/2018     Paroxysmal atrial fibrillation (H) 10/4/2018     S/P ablation of atrial fibrillation 10/4/2018     Systolic heart failure (H)      Ulcerative colitis (H) 10/4/2018     Ventricular tachycardia (H) 10/4/2018     Past Surgical History:   Procedure Laterality Date     AICD, DUAL CHAMBER       CV HEART BIOPSY N/A 3/4/2019    Procedure: Heart Biopsy;  Surgeon: Abhijeet Titus MD;  Location:  HEART CARDIAC CATH LAB     CV HEART BIOPSY N/A 3/25/2019    Procedure: Heart Biopsy;  Surgeon: Yves Rodrigues MD;  Location:  HEART CARDIAC CATH LAB     CV HEART BIOPSY N/A 3/28/2019    Procedure: Heart Cath Heart Biopsy;  Surgeon: Yves Rodrigues MD;  Location:  HEART CARDIAC CATH LAB     CV INTRA-AORTIC BALLOON PUMP INSERTION N/A 2/18/2019     Procedure: Intra-Aortic Balloon;  Surgeon: Alton Solomon MD;  Location:  HEART CARDIAC CATH LAB     CV INTRA-AORTIC BALLOON PUMP INSERTION N/A 2/20/2019    Procedure: Replace subclavian IABP;  Surgeon: Yves Rodrigues MD;  Location:  HEART CARDIAC CATH LAB     CV LEFT HEART CATH N/A 3/19/2019    Procedure: Left Heart Cath;  Surgeon: Yves Rodrigues MD;  Location:  HEART CARDIAC CATH LAB     CV RIGHT HEART CATH N/A 3/19/2019    Procedure: RHC/HBx - Femoral access for 3/19 per Nimisha GONZALEZ;  Surgeon: Yves Rodrigues MD;  Location:  HEART CARDIAC CATH LAB     CV RIGHT HEART CATH N/A 3/25/2019    Procedure: Right Heart Cath;  Surgeon: Yves Rodrigues MD;  Location:  HEART CARDIAC CATH LAB     CV RIGHT HEART CATH N/A 3/28/2019    Procedure: Heart Cath Right Heart Cath;  Surgeon: Yves Rodrigues MD;  Location:  HEART CARDIAC CATH LAB     INCISION AND DRAINAGE CHEST WASHOUT, COMBINED N/A 3/21/2019    Procedure: Incision And Drainage; Evacuation Right Chest Wall Hematoma;  Surgeon: Dewayne House MD;  Location: [facility] OR     INSERT INTRAAORTIC BALLOON PUMP Left 2/19/2019    Procedure: Insert left  Subclavian Balloon Pump,  Removal Right femoral arterial balloom pump sheath;  Surgeon: Dewayne House MD;  Location: [facility] OR     INSERT INTRAAORTIC BALLOON PUMP Left 2/21/2019    Procedure: SUBCLAVIAN BALLOON PUMP PLACEMENT;  Surgeon: Ben White MD;  Location: [facility] OR     TRANSPLANT HEART RECIPIENT N/A 2/24/2019    Procedure: TRANSPLANT HEART RECIPIENT;  Surgeon: Dewayne House MD;  Location:  OR         Social History  The patient lives in [place]  He lives alone  He plans to discharge to his parents home who lives in [facility] where he grew up  There are 5 steps to enter the house  He will need to navigate 12 stairs to his bedroom and bathroom    The patient was previously independent with ambulation without use of cane or walker, previously independent with  "upper and lower body self care, ADLs, and IADLs.         Review of Systems  10 point ROS neg other than the symptoms noted above in the HPI and below:  Notes shortness of breath with exertion  Abdominal discomfort  Denies any nausea vomiting  Right groin discomfort  Last bowel movement yesterday  Reports that he has been voiding well, perhaps too much with the diuretics  Feet he reports of swelling swollen  Feels generally weak  Remainder of the review of the systems was negative.    Physical Examination  /82 (BP Location: Left arm)   Pulse 88   Temp 97.6  F (36.4  C) (Oral)   Resp 15   Ht 1.727 m (5' 8\")   Wt 53.7 kg (118 lb 6.4 oz)   SpO2 100%   BMI 18.00 kg/m    General Awake, alert, not in distress  HEENT EOMs intact  AICD site; following removal is clean dry and intact open to air  Cardiac Regular, no murmurs  Respiratory Clear breath sounds  Abdomen Soft, nontender  Lower extremities 1+ edema  Examination of the abdomen is consistent with one H5 incisions horizontally right below the epigastric region.  He has a vertical incision as well in the chest  All are open to air and no drainage is noted      Neurologic   Alert and oriented x 3  Speech is intact  comprehension is remarkable     Muscle Strength:   4/5 in upper and lower extremities proximally and 5 out of 5 distally  He has significant muscular atrophy           Labs  Lab Results   Component Value Date    WBC 5.9 04/02/2019         Lab Results   Component Value Date    RBC 2.60 04/02/2019     Lab Results   Component Value Date    HGB 7.9 04/02/2019     Lab Results   Component Value Date    HCT 25.7 04/02/2019     No components found for: MCT  Lab Results   Component Value Date    MCV 99 04/02/2019     Lab Results   Component Value Date    MCH 30.4 04/02/2019     Lab Results   Component Value Date    MCHC 30.7 04/02/2019     Lab Results   Component Value Date    RDW 18.6 04/02/2019     Lab Results   Component Value Date     04/02/2019 "       Lab Results   Component Value Date     04/02/2019      Lab Results   Component Value Date    POTASSIUM 4.1 04/02/2019     Lab Results   Component Value Date    CHLORIDE 104 04/02/2019     Lab Results   Component Value Date    RADAMES 7.7 04/02/2019     Lab Results   Component Value Date    CO2 21 04/02/2019     Lab Results   Component Value Date    BUN 50 04/02/2019     Lab Results   Component Value Date    CR 1.85 04/02/2019     Lab Results   Component Value Date    GLC 96 04/02/2019     No components found for: MRI      Medications  Current Facility-Administered Medications   Medication     acetaminophen (TYLENOL) tablet 650 mg     albuterol (PROAIR HFA/PROVENTIL HFA/VENTOLIN HFA) 108 (90 Base) MCG/ACT inhaler 2 puff     alum & mag hydroxide-simethicone (MYLANTA ES/MAALOX  ES) suspension 30 mL     [START ON 4/4/2019] aspirin (ASA) chewable tablet 81 mg     azelastine (ASTELIN) nasal spray 2 spray     bumetanide (BUMEX) tablet 4 mg     calcium carbonate (TUMS) chewable tablet 500 mg     calcium carbonate 600 mg-vitamin D 400 units (CALTRATE) per tablet 1 tablet     [START ON 4/4/2019] DULoxetine (CYMBALTA) EC capsule 20 mg     [START ON 4/4/2019] fluticasone (ARNUITY ELLIPTA) 200 MCG/ACT inhaler 1 puff     [START ON 4/4/2019] levothyroxine (SYNTHROID/LEVOTHROID) tablet 100 mcg     magnesium oxide (MAG-OX) tablet 400 mg     melatonin tablet 3-9 mg     mesalamine (ASACOL HD) EC tablet 2,400 mg     montelukast (SINGULAIR) tablet 10 mg     [START ON 4/4/2019] multivitamin w/minerals (THERA-VIT-M) tablet 1 tablet     mycophenolate (GENERIC EQUIVALENT) tablet 1,500 mg     naloxone (NARCAN) injection 0.1-0.4 mg     nystatin (MYCOSTATIN) suspension 500,000 Units     omeprazole (priLOSEC) CR capsule 40 mg     oxyCODONE IR (ROXICODONE) half-tab 2.5 mg     Patient is already receiving anticoagulation with heparin, enoxaparin (LOVENOX), warfarin (COUMADIN)  or other anticoagulant medication     polyethylene glycol  (MIRALAX/GLYCOLAX) Packet 17 g     potassium chloride ER (K-DUR/KLOR-CON M) CR tablet 40 mEq     predniSONE (DELTASONE) tablet 10 mg     [START ON 4/4/2019] predniSONE (DELTASONE) tablet 10 mg     [START ON 4/4/2019] rosuvastatin (CRESTOR) tablet 10 mg     [START ON 4/4/2019] senna-docusate (SENOKOT-S/PERICOLACE) 8.6-50 MG per tablet 1 tablet     [START ON 4/4/2019] sulfamethoxazole-trimethoprim (BACTRIM DS/SEPTRA DS) 800-160 MG per tablet 1 tablet     [START ON 4/5/2019] sulfamethoxazole-trimethoprim (BACTRIM/SEPTRA) 400-80 MG per tablet 1 tablet     tacrolimus (GENERIC EQUIVALENT) capsule 3 mg     [START ON 4/4/2019] valGANciclovir (VALCYTE) tablet 450 mg         ASSESSMENT / MANAGEMENT AND INITIATION OF PLAN OF CARE:      POST-ADMISSION EVALUATION:  I have evaluated the patient on admission to the Acute Rehab Center and compared with the preadmission screen, no significant differences are identified and remains appropriate for acute rehabilitation. See below for more details and specifics regarding this admission that supports requiring medical and therapy intensity in the acute rehab setting.      Patient will work with PT for 90 min daily to work on gait exercises, strengthening, endurance buildup, transfers with use of walker as needed.   Patient will work with OT for 90 min daily to work on upper and lower body self care, dressing, toileting, bathing, energy conservation techniques with use of ADs as needed.     Rehab RN to administer medication, patient education on medication taking, VS monitoring, and surgical wound dressing changes and monitoring.     Medical Management:  Status post orthotopic heart transplant for nonischemic cardiomyopathy; currently on immunosuppressant as per cardiology.  His recent biopsies negative for rejection.  We will consult hospitalist group to manage the patient medically.    Deconditioning/debility; in the setting of chronic deconditioning given the generalized atrophy of  the muscles.  Recommend intense PT and OT, closely monitoring his symptoms of dizziness lightheadedness, heart rate blood pressures following the heart transplant.  He will need to be closely monitored as he mobilizes his fluids, and improves in his overall function    ID; he has had several issues including sepsis, left lower lobe pneumonia, is currently finishing his Bactrim dose for 10 days on 4-5-2019.  Closely monitor his white blood count.  Severe malnutrition; he was on tube feeds until 25 March, he is currently on a low sodium diet.  Rehab nursing to closely monitor his p.o. intake and identify any issues of malnutrition.    Acute kidney injury; he is currently on Bumex 4 mg twice daily with potassium he has required both CRRT and hemodialysis which were discontinued as his creatinine improves.  Close monitoring of the creatinine is recommended.    Right chest wall hematoma at AICD site status post drainage on 21 March  Continue aspirin  Continue Synthroid for hypothyroidism  Continue melatonin for insomnia  Crestor for hyperlipidemia    Bladder, Bowel, GI and DVT ppx   Bladder ;check PVRs ×3 straight cath for volumes more than 350 cc.  Monitor for symptoms of urinary tract infection, rehab nursing to send for a UA as needed  Bowels; place the patient on a bowel program and titrate medications as needed.  Hold the bowel program in case of loose stools.  Educated patient on Impact with fiber and fluid intake on a bowel function  DVT prophylaxis with mechanical means    Code Status full    Prognosis for medical improvement and stabilization of the medical issues for discharge to home is good.     Estimated Length of Stay: 10-14 days        Post Admission Physician Evaluation:     I have compared Everton Larios's condition on admission to acute rehabilitation to that outlined in the preadmission screen. History and physical exam performed by me.    No significant differences are identified and the patient  remains appropriate for an inpatient rehabilitation facility level of care to manage medical issues and address functional impairments due to deconditioning debility in the setting of heart transplant     Comorbid medical conditions being managed:   As noted above     Prior functional level: Previously independent with mobility and ADLs, although had become less active over the last several months due to progression of illness.      Present function:   He is currently requiring contact guard assist with close wheelchair follow able to ambulate about 60 feet.  He is requiring min to mod assist for transfers     Anticipated rehabilitation course: Anticipate he  will require 10-14 days. Anticipate he will be discharged home with his family for support      Will benefit from intensive rehabilitation includin minutes each of PT, OT  - seven days a week. Anticipate he will be able to tolerate the intensity of the therapies.        Rehabilitation nursing; has rehab nursing needs in the reenforcement of the strategies, taught by the therapists, and bowel and bladder management   Close management by physiatry.      Prognosis:  fair    Estimated length of stay: 10-14 days        Hope Vasquez MD, MHA   Department of Physical Medicine and Rehabilitation  [facility]           Total time spent: 70 minutes with more than half the time was spent counseling and / or coordination of care   Includes counseling / education / discussion     Hope Vasquez MD, MHA

## 2019-04-03 NOTE — PROGRESS NOTES
[facility]   Cardiology II Service / Advanced Heart Failure  Daily Progress Note  Date of Service: 4/3/2019      Patient: Everton Larios  MRN: [id number]  Admission Date: 2/17/2019  Hospital Day # 45    Assessment and Plan: Everton Larios is a 54 year old male with a PMH of Hypothyroidism, Mild Intermittent Asthma, UC, Depression, ETOH abuse, NICM complicated by cardiogenic shock s/p IABP with subsequent OHT 2/24/19. Postoperative course complicated by RV dysfunction and FRANKLIN on HD. Cardiology consult for immunosuppression management and fluid management.      S/p OHT. 2/24/19 complicated by RV dysfunction and FRANKLIN on HD. Echo 3/26/19 consistent with moderate wall thickening with LV hypertrophy, EF 60-65%, moderately dilated RV, and dilated IVC. RHC 3/28/19 mRA-9, RV-35/9, mPA-25, mPCW-18, JOSEPHINE CI-2.48, and JOSEPHINE CO-4.11. Biopsy 3/28/19 OR, PAMR0, weak-moderate C4d staining with ischemia and reperfusion injury.  Serostatus: CMV+, EBV+, HSV1+, HSV2 neg, VZV+  Immunosuppression: Cellcept 1500 mg po BID, Tac 3 mg po BID, level 8.3 4/2Goal-8-10. Prednisone 10 mg po BID. Repeat tac level 4/4/19.  Prophylaxis:  - Nystatin swish and swallow  - Continue daily Bactrim through 4/5/19, change to bi-weekly thereafter  - Weekly CMV DNA quant per ID negative 4/1/19.  - Continue Crestor. ASA held in setting of bleeding.   - Continue Bumex 4 mg po BID, will likely need tapering in the next few days.   - Next biopsy due 4/10/19, please arrange transportation from [facility].   ================================================================    Interval History/ROS: he notes persistent loose stools about 2-3 times a day. He denies fever, chills, chest pain, palpitations, MATA, cough, nausea, and vomiting. He notes LE edema and strength continue to improve. He is tolerating oral intake and therapy well.     Last 24 hr care team notes reviewed.   ROS:  4 point ROS including Respiratory, CV, GI and , other than that  "noted in the HPI, is negative.     Medications: Reviewed in EPIC.     Physical Exam:   /88 (BP Location: Left arm)   Pulse 91   Temp 98.4  F (36.9  C) (Oral)   Resp 16   Ht 1.727 m (5' 8\")   Wt 53.8 kg (118 lb 11.2 oz)   SpO2 99%   BMI 18.05 kg/m     GENERAL: Appears alert and oriented times three.   HEENT: Eye symmetrical and free of discharge bilaterally. Mucous membranes moist and without lesions.  NECK: Supple and without lymphadenopathy. JVD 8-10 cm.   CV: RRR, S1S2 present without murmur, rub, or gallop.   RESPIRATORY: Respirations regular, even, and unlabored. Lungs CTA throughout.   GI: Soft and non distended with normoactive bowel sounds present in all quadrants. No tenderness, rebound, guarding. No organomegaly.   EXTREMITIES: +1 bilateral peripheral edema. 2+ bilateral pedal pulses.   NEUROLOGIC: Alert and orientated x 3. CN II-XII grossly intact. No focal deficits.   MUSCULOSKELETAL: No joint swelling or tenderness.   SKIN: No jaundice. No rashes or lesions.     Data:  CMP  Recent Labs   Lab 04/02/19  0514 04/01/19  0653 03/31/19  0551 03/30/19  1928 03/30/19  0531    134 135 135 134   POTASSIUM 4.1 4.6 3.6 3.4 3.4   CHLORIDE 104 104 104 100 102   CO2 21 19* 17* 20 18*   ANIONGAP 12 12 13 15* 14   GLC 96 86 126* 105* 101*   BUN 50* 53* 61* 63* 70*   CR 1.85* 2.11* 2.19* 2.39* 2.25*   GFRESTIMATED 40* 34* 32* 29* 31*   GFRESTBLACK 46* 39* 38* 34* 36*   RADAMES 7.7* 8.2* 7.8* 7.8* 7.8*   MAG 1.5* 1.8 1.9  --  1.7   PHOS 3.4 2.5 2.9  --  2.8   PROTTOTAL  --  5.6*  --   --   --    ALBUMIN  --  2.8*  --   --   --    BILITOTAL  --  0.6  --   --   --    ALKPHOS  --  135  --   --   --    AST  --  14  --   --   --    ALT  --  28  --   --   --      CBC  Recent Labs   Lab 04/02/19  0514 04/01/19  0653 03/31/19  0551 03/30/19  0531   WBC 5.9 7.9 6.5 5.2   RBC 2.60* 2.81* 2.49* 2.56*   HGB 7.9* 8.4* 7.6* 7.7*   HCT 25.7* 28.1* 24.5* 25.0*   MCV 99 100 98 98   MCH 30.4 29.9 30.5 30.1   MCHC 30.7* 29.9* " 31.0* 30.8*   RDW 18.6* 18.9* 18.7* 19.1*    299 280 260     Patient discussed with Dr. Donis.      Karlene Ames FNP  4/3/2019

## 2019-04-03 NOTE — PROGRESS NOTES
CVTS Daily Note  4/3/2019  Attending: Dr House    S:   States that pain is being controlled.   Breathing is okay. Working with both IS and flutter valve.  Appetite has picked up. Denies any nausea. +BM this am  Denies any popping/clicking in his breast bone.  Working with therapy. States that he is still quite weak.  Feels good about being able to transition to ARU.  Still has swelling in his lower extremities, improving    O:   Vitals:    04/02/19 2331 04/03/19 0100 04/03/19 0355 04/03/19 0728   BP: 111/76  114/79 122/88   BP Location: Left arm  Left arm Left arm   Pulse:       Resp: 18 16 16   Temp: 98.5  F (36.9  C)  98.3  F (36.8  C) 98.4  F (36.9  C)   TempSrc: Oral  Oral Oral   SpO2: 98%  97% 99%   Weight:  53.8 kg (118 lb 11.2 oz)     Height:         Vitals:    04/01/19 0040 04/02/19 0400 04/03/19 0100   Weight: 56 kg (123 lb 8 oz) 54.9 kg (121 lb) 53.8 kg (118 lb 11.2 oz)     Gen: lying in bed, comfortable, -O2  CV: RRR, telemetry SR 80s, +edema in feet  Pulm: CTA  Abd: BS+, NT/ND  Incision: sternal incision D/I    Labs:  BMP  Recent Labs   Lab 04/02/19  0514 04/01/19  0653 03/31/19  0551 03/30/19 1928    134 135 135   POTASSIUM 4.1 4.6 3.6 3.4   CHLORIDE 104 104 104 100   RADAMES 7.7* 8.2* 7.8* 7.8*   CO2 21 19* 17* 20   BUN 50* 53* 61* 63*   CR 1.85* 2.11* 2.19* 2.39*   GLC 96 86 126* 105*     CBC  Recent Labs   Lab 04/02/19  0514 04/01/19  0653 03/31/19  0551 03/30/19  0531   WBC 5.9 7.9 6.5 5.2   RBC 2.60* 2.81* 2.49* 2.56*   HGB 7.9* 8.4* 7.6* 7.7*   HCT 25.7* 28.1* 24.5* 25.0*   MCV 99 100 98 98   MCH 30.4 29.9 30.5 30.1   MCHC 30.7* 29.9* 31.0* 30.8*   RDW 18.6* 18.9* 18.7* 19.1*    299 280 260     INR  No lab results found in last 7 days.   Hepatic Panel   Lab Results   Component Value Date    AST 16 03/26/2019     Lab Results   Component Value Date    ALT 23 03/26/2019     Lab Results   Component Value Date    ALBUMIN 2.4 03/26/2019     GLUCOSE:   Recent Labs   Lab 04/02/19  0514  04/01/19  0653 03/31/19  0551 03/30/19  1928 03/30/19  0531 03/29/19  0430 03/29/19  0211 03/28/19  2211 03/28/19  1732 03/28/19  1208 03/28/19  0752  03/28/19  0134   GLC 96 86 126* 105* 101* 94  --   --   --   --   --    < >  --    BGM  --   --   --   --   --   --  99 140* 209* 82 137*  --  185*    < > = values in this interval not displayed.       Imaging:  reviewed recent imaging      A/P:  Everton Larios is a 53 year old male with PMH etoh abuse, hypothyroidism, intermittent asthma, ulcerative colitis, Depression, Chronic HFrEF, NICM admitted with cardiogenic shock requiring subclavian balloon pump. He is now s/p OHT 2/24/19 with Piyush House and Christopher.    - RHC and biopsy 3/4;   0R, no AMR  - RHC and biopsy 3/11; 0R, no AMR  - RHC and biopsy 3/18; 1R, no AMR   - RHC and biopsy 3/25;  0R, biopsy suggestive of AMR.   - RHC and biopsy 3/28;  0R, no AMR      Neuro:   - Neuro intact. ICU delirium improving. Head CT 3/9 without acute pathology.   - Hx Depression: pta cymbalta  - Sleep: melatonin   - Tylenol and oxycodone PRN for pain      CV:   - Hx of NICM, now S/P heart transplant 2/24. No arrhythmia, HD stable.  - ASA 81 mg, Crestor. Sildenafil 20 mg TID.   - Immunosuppression per cardiology   - RHC 3/25 Mild elevated filling pressures. Clinically stable off dobutamine.   - Removed lena drain to right pacer pocket 3/26.   - RHC 3/28/19: RA 9, PA 35/19 (25), PCWP 18, Jaden CO/CI 5.4/3.2  - Possible mild antibody medicated rejection on bx from 3/25, result from 3/28 was no rejection. Cardiology following and with adjust immunosuppression.      Pulm:   - Hx intermittent asthma, pta Singulair.   - Pulm toilet, IS, activity and deep breathing   - Supplemental O2 PRN to keep sats > 92%. Wean off as tolerated.  - LLL consolidation, albuterol and mucomyst Nebs today x 2, assess response.   - Sputum culture showing Klebsiella- zosyn started 3/21, changed to bactrim with sensitivities, ID recommended Bactrim for 10  days. Starting on 4/5 will decrease bactrim dose to 400/80 mg daily on Tuesday/Thursday/Saturday.     ID:   - WBC WNL, afebrile, no signs or symptoms of infection other than SOB. Completed 7 days cefepime/zosyn 3/16.   - Blood cultures 3/13: NGTD. C diff negative 3/17.    - Sputum culture positive 3/20, bactrim, ID consulted, continue bactrim for klebsiella pneumonia. Discuss duration with ID.       GI / FEN:  - Reg diet, bowel regimen. Calorie counts.  - Tube feeds were off 3/21 for nausea. New formula 3/22 pm. - removed NJ and stopped tube feeding 3/25, patient refusing TF since 3/20.   - Hx ulcerative colitis; pta mesalamine. Stool occult negative 3/20.  - Increased PPI to bid for GERD sx, reports having been diagnoses with Pierce's esophagus in the past.   - RUQ abdominal pain: RUQ US 3/26 showing mild gallbladder wall thickening with biliary sludge. No classic biliary symptoms, no fevers or leukocytosis. Biliary dyskinesia?      Renal / :   - Creatinine improving, PO Bumex.   - Nephrology following for oliguria and T/Th/Sa hemodialysis. Unable to get tunneled dialysis catheter due to lena drain to old right pacer pocket. RIJ dialysis catheter no longer functional and dialysis unable to be performed 3/21. Patient is showing signs of renal recovery but still not enough to go completely without dialysis. RIJ dialysis catheter replaced on 3/22 and removed on 2/29.   - Off dialysis since 3/23, electrolytes stable. Nephrology believes he will no longer need dialysis. Bumex challenge 3 IV 3/26 with 1.1L UOP; 3/27, 875 of U/o. UA clean 3/26.  - Cardiology following for diuresis. Planning Bumex 4 BID with 40 mEq K TID      Heme / Anticoagulation:  - Got 1 unit PRBC for Hgb 6.9 on 3/24, hgb stable. No signs of bleeding.   - Will need coumadin eventually for SVC graft per Dr House. Will need to discuss with Cardiology, may prefer Apixaban due to frequent biopsies.    - No biopsies or central line access on left due  to SVC graft.   - Holding Hep gtt since 3/18. Has R chest hematoma shouldn't interfere with RIGHT chest HD tunneled catheter placement. Chest CT 3/19 confirms R chest hematoma at old AICD site, s/p drainage in OR 3/21.      Endo:   - Hypothyroid: levothyroxine 100 mcg daily   - Sliding corrective scale qid      PPX:   - Protonix     Dispo:   - 6C since 3/16  - Medically stable for discharge since Friday 3/29  - ARU likely today.     Encouraged IS/TCDB/amb. Sternal precautions. Awaiting tacro level. Ok to discharge to ARU from surgery stand point. Will d/w cards.continue to monitor.      Fredrick Strickland PA-C  Cardiothoracic Surgery  April 3, 2019 7:51 AM   p: [phone]

## 2019-04-03 NOTE — PROGRESS NOTES
Pt completed ~150 feet x 2 CGA with FWW to/from sunroom for engagement in therapeutic exercise. Pt requesting OOR activity to improve mental well-being. Pt needing min A for sit <> standing transfers using sternal precautions due to LE weakness. Pt used LE ergometer for 12 minutes to increase aerobic strength and endurance, with increased resistance every 3 minutes; tolerating well and VSS. Pt discharging to ARU today and looking forward to do so as expressing frustrations with therapy staff but unable to provide any recommendations/changes or examples of what was frustrated. Encouraged open communication and to verbalize any needs throughout session to maximize patient satisfaction. Immediate patient needs met.

## 2019-04-03 NOTE — PLAN OF CARE
OT 6C  Discharge Planner OT   Patient plan for discharge: rehab   Current status: Pt requiring min A for sit <> stand due to LE weakness. CGA for functional mobility with FWW in room and ~150 feet x 2. Tolerating x 12 minutes with LE ergometer with progressive resistance. SBA-CGA for standing ADLs.   Barriers to return to prior living situation: weakness, sternal precautions, impaired balance  Recommendations for discharge: ARU   Rationale for recommendations: Pt would benefit from intensive, multidisciplinary therapy to progress level of functional IND as pt continuing to require assist.        Entered by: Carla Alejo 04/03/2019 2:26 PM     Occupational Therapy Discharge Summary    Reason for therapy discharge:    Discharged to acute rehabilitation facility.    Progress towards therapy goal(s). See goals on Care Plan in [facility] electronic health record for goal details.  Goals not met.  Barriers to achieving goals:   discharge from facility.    Therapy recommendation(s):    Continued therapy is recommended.  Rationale/Recommendations:  see above..

## 2019-04-03 NOTE — PROGRESS NOTES
"Pt cleared for transfer to ARU at 14:30 today. Doing well post-op. Up in chair most of the time; ambulates to BR w/assist of walker; primarily needs assistance to stand from sitting position.  Denies pain other than \"tingling\"/mild discomfort in feet from edema. Continues on po bumex w/good urine outpt. Weight down today.  Good appetite. BM yesterday.  Incisions healing well. Small drsg over skin tear on L hand.  Plan to remove midline iv prior to transfer to ARU. Pt states he will alert family once he has moved to rehab.  "

## 2019-04-03 NOTE — PROGRESS NOTES
Transplant Social Work Service Discharge Note      Patient Name:  Everton Larios     Anticipated Discharge Date:  4/3/19    Discharge Disposition:   ARU:   ARU    Plan for 24 hour care for immediate post transplant period: After  ARU pt plans on staying with his parents.     Additional Services/Equipment Arranged:  HE w/c transport arranged for 1430.      Persons notified of above discharge plan:  Pt informed of plan. Pt reports he has updated his parents on discharge plan.     Patient / Family response to discharge plan:  Pt is pleasant and eager to discharge to ARU today.     Education and resources provided by DOMENIC at discharge: role of transplant  in out patient setting and provided contact info for , availability of support groups, and transition to ARU    Discussed anticipated pharmacy out of pocket costs: YES    Provided [employer] Donor Letter Writing packet : YES    Plan:     Pt to go to  ARU and then eventually transition to his parent's home, where his mother will be able to provide 24hr care/supervision and then to his own home.

## 2019-04-04 LAB
ALBUMIN SERPL-MCNC: 2.9 G/DL (ref 3.4–5)
ALP SERPL-CCNC: 139 U/L (ref 40–150)
ALT SERPL W P-5'-P-CCNC: 26 U/L (ref 0–70)
ANION GAP SERPL CALCULATED.3IONS-SCNC: 12 MMOL/L (ref 3–14)
AST SERPL W P-5'-P-CCNC: 16 U/L (ref 0–45)
BILIRUB SERPL-MCNC: 0.6 MG/DL (ref 0.2–1.3)
BUN SERPL-MCNC: 45 MG/DL (ref 7–30)
CALCIUM SERPL-MCNC: 7.9 MG/DL (ref 8.5–10.1)
CHLORIDE SERPL-SCNC: 103 MMOL/L (ref 94–109)
CO2 SERPL-SCNC: 22 MMOL/L (ref 20–32)
CREAT SERPL-MCNC: 1.58 MG/DL (ref 0.66–1.25)
ERYTHROCYTE [DISTWIDTH] IN BLOOD BY AUTOMATED COUNT: 18 % (ref 10–15)
GFR SERPL CREATININE-BSD FRML MDRD: 48 ML/MIN/{1.73_M2}
GLUCOSE SERPL-MCNC: 162 MG/DL (ref 70–99)
HCT VFR BLD AUTO: 29.6 % (ref 40–53)
HGB BLD-MCNC: 9.1 G/DL (ref 13.3–17.7)
MCH RBC QN AUTO: 28.8 PG (ref 26.5–33)
MCHC RBC AUTO-ENTMCNC: 30.7 G/DL (ref 31.5–36.5)
MCV RBC AUTO: 94 FL (ref 78–100)
PLATELET # BLD AUTO: 267 10E9/L (ref 150–450)
POTASSIUM SERPL-SCNC: 4.2 MMOL/L (ref 3.4–5.3)
PROT SERPL-MCNC: 5.8 G/DL (ref 6.8–8.8)
RBC # BLD AUTO: 3.16 10E12/L (ref 4.4–5.9)
SODIUM SERPL-SCNC: 137 MMOL/L (ref 133–144)
TACROLIMUS BLD-MCNC: 9.9 UG/L (ref 5–15)
TME LAST DOSE: NORMAL H
WBC # BLD AUTO: 8.1 10E9/L (ref 4–11)

## 2019-04-04 PROCEDURE — 80197 ASSAY OF TACROLIMUS: CPT | Performed by: NURSE PRACTITIONER

## 2019-04-04 PROCEDURE — 25000132 ZZH RX MED GY IP 250 OP 250 PS 637: Performed by: PHYSICIAN ASSISTANT

## 2019-04-04 PROCEDURE — 12800006 ZZH R&B REHAB

## 2019-04-04 PROCEDURE — 99207 ZZC CONSULT E&M CHANGED TO INITIAL LEVEL: CPT | Performed by: PHYSICIAN ASSISTANT

## 2019-04-04 PROCEDURE — 97535 SELF CARE MNGMENT TRAINING: CPT | Mod: GO | Performed by: OCCUPATIONAL THERAPIST

## 2019-04-04 PROCEDURE — 97166 OT EVAL MOD COMPLEX 45 MIN: CPT | Mod: GO | Performed by: OCCUPATIONAL THERAPIST

## 2019-04-04 PROCEDURE — 97530 THERAPEUTIC ACTIVITIES: CPT | Mod: GP | Performed by: PHYSICAL THERAPIST

## 2019-04-04 PROCEDURE — 97162 PT EVAL MOD COMPLEX 30 MIN: CPT | Mod: GP | Performed by: PHYSICAL THERAPIST

## 2019-04-04 PROCEDURE — 25000131 ZZH RX MED GY IP 250 OP 636 PS 637: Performed by: PHYSICIAN ASSISTANT

## 2019-04-04 PROCEDURE — 99232 SBSQ HOSP IP/OBS MODERATE 35: CPT | Performed by: NURSE PRACTITIONER

## 2019-04-04 PROCEDURE — 80053 COMPREHEN METABOLIC PANEL: CPT | Performed by: NURSE PRACTITIONER

## 2019-04-04 PROCEDURE — 99222 1ST HOSP IP/OBS MODERATE 55: CPT | Performed by: PHYSICIAN ASSISTANT

## 2019-04-04 PROCEDURE — 97530 THERAPEUTIC ACTIVITIES: CPT | Mod: GO | Performed by: OCCUPATIONAL THERAPIST

## 2019-04-04 PROCEDURE — 36415 COLL VENOUS BLD VENIPUNCTURE: CPT | Performed by: NURSE PRACTITIONER

## 2019-04-04 PROCEDURE — 97110 THERAPEUTIC EXERCISES: CPT | Mod: GP | Performed by: PHYSICAL THERAPIST

## 2019-04-04 PROCEDURE — 97110 THERAPEUTIC EXERCISES: CPT | Mod: GO | Performed by: OCCUPATIONAL THERAPIST

## 2019-04-04 PROCEDURE — 85027 COMPLETE CBC AUTOMATED: CPT | Performed by: NURSE PRACTITIONER

## 2019-04-04 RX ORDER — AMOXICILLIN 250 MG
1 CAPSULE ORAL 2 TIMES DAILY PRN
Status: DISCONTINUED | OUTPATIENT
Start: 2019-04-04 | End: 2019-04-12 | Stop reason: HOSPADM

## 2019-04-04 RX ORDER — PREDNISONE 10 MG/1
10 TABLET ORAL
Status: DISCONTINUED | OUTPATIENT
Start: 2019-04-04 | End: 2019-04-12 | Stop reason: HOSPADM

## 2019-04-04 RX ORDER — BUMETANIDE 2 MG/1
4 TABLET ORAL
Status: DISCONTINUED | OUTPATIENT
Start: 2019-04-04 | End: 2019-04-04

## 2019-04-04 RX ORDER — SULFAMETHOXAZOLE AND TRIMETHOPRIM 400; 80 MG/1; MG/1
1 TABLET ORAL
Status: DISCONTINUED | OUTPATIENT
Start: 2019-04-04 | End: 2019-04-04

## 2019-04-04 RX ORDER — SULFAMETHOXAZOLE AND TRIMETHOPRIM 400; 80 MG/1; MG/1
1 TABLET ORAL
Status: DISCONTINUED | OUTPATIENT
Start: 2019-04-05 | End: 2019-04-05

## 2019-04-04 RX ADMIN — PREDNISONE 10 MG: 10 TABLET ORAL at 17:54

## 2019-04-04 RX ADMIN — SULFAMETHOXAZOLE AND TRIMETHOPRIM 1 TABLET: 800; 160 TABLET ORAL at 07:41

## 2019-04-04 RX ADMIN — POTASSIUM CHLORIDE 40 MEQ: 750 TABLET, EXTENDED RELEASE ORAL at 21:02

## 2019-04-04 RX ADMIN — Medication 500000 UNITS: at 21:05

## 2019-04-04 RX ADMIN — MYCOPHENOLATE MOFETIL 1500 MG: 500 TABLET ORAL at 17:54

## 2019-04-04 RX ADMIN — ACETAMINOPHEN 650 MG: 325 TABLET, FILM COATED ORAL at 00:47

## 2019-04-04 RX ADMIN — TACROLIMUS 3 MG: 1 CAPSULE ORAL at 17:54

## 2019-04-04 RX ADMIN — TACROLIMUS 3 MG: 1 CAPSULE ORAL at 07:44

## 2019-04-04 RX ADMIN — POTASSIUM CHLORIDE 40 MEQ: 750 TABLET, EXTENDED RELEASE ORAL at 14:23

## 2019-04-04 RX ADMIN — POTASSIUM CHLORIDE 40 MEQ: 750 TABLET, EXTENDED RELEASE ORAL at 07:42

## 2019-04-04 RX ADMIN — MYCOPHENOLATE MOFETIL 1500 MG: 500 TABLET ORAL at 07:43

## 2019-04-04 RX ADMIN — MAGNESIUM OXIDE TAB 400 MG (241.3 MG ELEMENTAL MG) 400 MG: 400 (241.3 MG) TAB at 21:02

## 2019-04-04 RX ADMIN — DULOXETINE HYDROCHLORIDE 20 MG: 20 CAPSULE, DELAYED RELEASE ORAL at 07:41

## 2019-04-04 RX ADMIN — MESALAMINE 2400 MG: 800 TABLET, DELAYED RELEASE ORAL at 21:02

## 2019-04-04 RX ADMIN — OMEPRAZOLE 40 MG: 20 CAPSULE, DELAYED RELEASE ORAL at 06:38

## 2019-04-04 RX ADMIN — FLUTICASONE FUROATE 1 PUFF: 200 POWDER RESPIRATORY (INHALATION) at 07:46

## 2019-04-04 RX ADMIN — ASPIRIN 81 MG CHEWABLE TABLET 81 MG: 81 TABLET CHEWABLE at 07:43

## 2019-04-04 RX ADMIN — BUMETANIDE 4 MG: 2 TABLET ORAL at 07:42

## 2019-04-04 RX ADMIN — MAGNESIUM OXIDE TAB 400 MG (241.3 MG ELEMENTAL MG) 400 MG: 400 (241.3 MG) TAB at 07:39

## 2019-04-04 RX ADMIN — MONTELUKAST SODIUM 10 MG: 10 TABLET, COATED ORAL at 21:02

## 2019-04-04 RX ADMIN — MELATONIN TAB 3 MG 9 MG: 3 TAB at 21:02

## 2019-04-04 RX ADMIN — Medication 500000 UNITS: at 16:07

## 2019-04-04 RX ADMIN — LEVOTHYROXINE SODIUM 100 MCG: 100 TABLET ORAL at 07:43

## 2019-04-04 RX ADMIN — Medication 500000 UNITS: at 12:44

## 2019-04-04 RX ADMIN — MESALAMINE 2400 MG: 800 TABLET, DELAYED RELEASE ORAL at 07:39

## 2019-04-04 RX ADMIN — CALCIUM CARBONATE 600 MG (1,500 MG)-VITAMIN D3 400 UNIT TABLET 1 TABLET: at 07:39

## 2019-04-04 RX ADMIN — PREDNISONE 10 MG: 10 TABLET ORAL at 07:41

## 2019-04-04 RX ADMIN — OMEPRAZOLE 40 MG: 20 CAPSULE, DELAYED RELEASE ORAL at 16:07

## 2019-04-04 RX ADMIN — CALCIUM CARBONATE 600 MG (1,500 MG)-VITAMIN D3 400 UNIT TABLET 1 TABLET: at 17:54

## 2019-04-04 RX ADMIN — Medication 500000 UNITS: at 07:46

## 2019-04-04 RX ADMIN — ROSUVASTATIN CALCIUM 10 MG: 10 TABLET, FILM COATED ORAL at 07:40

## 2019-04-04 RX ADMIN — MULTIPLE VITAMINS W/ MINERALS TAB 1 TABLET: TAB at 07:40

## 2019-04-04 RX ADMIN — BUMETANIDE 4 MG: 2 TABLET ORAL at 14:23

## 2019-04-04 RX ADMIN — ACETAMINOPHEN 650 MG: 325 TABLET, FILM COATED ORAL at 12:44

## 2019-04-04 RX ADMIN — VALGANCICLOVIR 450 MG: 450 TABLET, FILM COATED ORAL at 07:44

## 2019-04-04 ASSESSMENT — ACTIVITIES OF DAILY LIVING (ADL): PREVIOUS_RESPONSIBILITIES: MEAL PREP;HOUSEKEEPING;LAUNDRY;SHOPPING;MEDICATION MANAGEMENT;FINANCES;DRIVING;WORK

## 2019-04-04 NOTE — PROVIDER NOTIFICATION
Social Work: Initial Assessment with Discharge Plan    Patient Name: Everton Larios  : 1965  Age: 54 year old  MRN: [id number]  Completed assessment with: patient  Admitted to ARU: 4/3/19     Presenting Information   Date of SW assessment: 2019  Health Care Directive: Patient considering completing  Primary Health Care Agent: default to next of kin  Secondary Health Care Agent: NA  Living Situation: resides alone. Planning to discharge to his parents home in [facility] for additional support.  Previous Functional Status: previously independent of cares and working  DME available: see therapy notes  Patient and family understanding of hospitalization: Yes  Cultural/Language/Spiritual Considerations: English speaking      Physical Health  Reason for admission: Heart Transplant    Provider Information   Primary Care Physician:Fredrick Wolff MD  : none    Mental Health/Chemical Dependency:   Diagnosis: history of Depression and prescribed Celexa. Pt denied changes in mood and declined healthpysch support.  Alcohol/Tobacco/Narcotis: denied  Support/Services in Place: none  Services Needed/Recommended: ale  Sexuality/Intimacy: denied concerns    Support System  Marital Status: single  Family support: Pt reported good support from his parents  Other support available: none    Community Resources  Current in home services: none  Previous services: none    Financial/Employment/Education  Employment Status: recently terminated from his work. Worked part time at [employer]  Income Source: SSDI and wages  Education: unknown  Financial Concerns:  Denied concerns  Insurance: [employer]      Discharge Plan   Patient and family discharge goal: Goal to return home with continued family support.  Provided Education on discharge plan: YES  Patient agreeable to discharge plan:  YES  Provided education and attained signature for Medicare IM and IRF Patient Rights and Privacy Information provided to  patient : Yes  Provided patient with [place] [facility] [place] Resources: No  Barriers to discharge: Medically stable and progress with therapies    Discharge Recommendations   Disposition: Goal to return home with continued family support.  Transportation Needs: family will provide  Name of Transportation Company and Phone: NA    Additional comments   Pt is a 54 yr old male admitted for an acute rehab stay following a heart transplant. Pt resides alone. Pt is planning to discharge to his parents home in [facility] for additional support as needed. Team working towards a safe discharge plan.    Please invite to Care Conference:  Verito (mother) - [phone]      RICARDO Lora, [facility]  [place]   Phone: [phone]  Pager: [phone]         04/04/19 1252   Living Arrangements   Lives With alone   Living Arrangements house   Able to Return to Prior Arrangements yes   Home Safety   Patient Feels Safe Living in Home? yes   Discharge Planning   Patient/Family Anticipates Transition to home with family   Discharge Needs Assessment   Transportation Anticipated family or friend will provide   Values Beliefs and Spiritual Care   C: Community: In support of your spiritual health, is there someone we may contact for you? (identify all that apply) no thank you

## 2019-04-04 NOTE — CONSULTS
[facility], [place]  Consult Note - Hospitalist Service     Date of Admission:  4/3/2019  Reason for Consult: Complex medical history    Assessment & Plan   Everton Larios is a 54 year old male admitted to ARU on 4/3/2019. He has a pmh of hypothyroidism, mild intermittent asthma, UC, depression, ETOH abuse, hypothyroidism, GERD, BPH, depression, paroxysmal Afib s/p ablation and cardioversion,  NICM c/b cardiogenic shock s/p IABP w/ subsequent OHT 2/24/19. Post op course c/b RV dysfunction, FRANKLIN on CKD requiring CRRT 3/8/19 transitioned to HD 3/14 (off 3/29), and HCAP. Discharged to ARU on 4/3 for ongoing therapies.     # NICM s/p OHT 2/24/19. Initially admitted to [facility] due to heart failure 2/2 non-ischemic cardiomyopathy on 2/17/19. Required assistance of intra-aortic balloon placement for hemodynamic support on 2/19/19. A donor heart became available and on 2/24/19 patient received OHT. Post operative course complicated by delirium (resolved),FRANKLIN (see below) and hematoma at previous AICD which required washout and drain placement on 3/21/19.   - Immunosuppression: cellcept 1500 mg BID, tacro 3 mg BID  - Prednisone 10 mg BID  - Tacro level q M/Th  - Qweekly CMV, next 4/8   - Nystatin for fungal ppx, valcyte every other day    FRANKLIN on CKD. Post op developed oliguric FRANKLIN thought 2/2 nephrotoxicity associated with tacrolimus and diuretic use.  and was started on CRRT 3/8 and transitioned to HD 3/14. He ultimately had renal recovery with end of HD 3/23. BL creatinine 1.1-1.4. Most recent Cr 1.58 (4/4).  - Avoid nephrotixins  - Continue Bumex BID for volume  - BMP, Mag, Phos qM/Th    Klebsiella oxytoca pneumonia. dx by sputum cx 3/20/19. CXR 3/22/19 with evidence of retrocardiac opacity, CXR 3/1/9 with nodular groundglass opacities in the R lung. Started zosyn 3/21 with clinical improvement, transitioned to PO bactrim 3/23 following sensitivities.  - Continue Bactrim at treatment dose  through today 4/4, then ppx dosing for OHT    UC. Continue mesalamine.   Hypothyroidism. Continue PTA levothyroxine.  BPH. Continue tamsulosin.  Depression. Continue duloxetine  GERD. Continue omeprazole  Mild Intermittent Asthma. Continue fluticasone, montelucast        The patient's care was discussed with the Attending Physician, Dr. Townsend.    Nadja Collins, PA-C  [facility], [place]    ______________________________________________________________________    Chief Complaint   Nocturia    History of Present Illness      Everton Larios is a 54 year old male admitted to ARU on 4/3/2019. He has a pmh of hypothyroidism, mild intermittent asthma, UC, depression, ETOH abuse, and NICM.  Initially admitted to [facility] due to heart failure 2/2 non-ischemic cardiomyopathy on 2/17/19. Had previously been listed for heart transplant, but required assistance of intra-aortic balloon placement for hemodynamic support on 2/19/19. A donor heart became available and on 2/24/19 patient received OHT. His post operative course was complicated by some delirium and encephalopathy of critical illness. Head CT 3/9 without acute pathology and delirium resolved. He developed oliguric FRANKLIN and was started on CRRT 3/8 and transitioned to HD 3/14. He ultimately had renal recovery with end of HD 3/23.   Post op patient was started on a heparin gtt with plans to transition to life-long coumadin. Unfortunately pt developed a hematoma at the site of prior AICD on his right chest and thus heparin gtt was stopped without ever starting coumadin (due to weekly heart biopsies). Hematoma s/p washout and removal at prior AICD site 3/21.  Post op course also c/b Klebsiella oxytoca pneumonia dx by sputum cx 3/20/19. Started zosyn 3/21 with clinical improvement, transitioned to PO bactrim 3/23 following sensitivities.     Pt discharged to ARU on 4/3 in stable condition for ongoing rehabilitation.    Currently patient is  feeling quite well. He notes some mild intermittent chest wall pain that comes and goes. He states that this is a familiar sensation, and that it does not bother him much. He denies any chest pressure or radiation of pain. He denies shortness of breath or difficulty breathing.   He complains of nocturia associated with the dosing of his bumex. He states that he is up until atleast 0100/0200 urinating and this is frustrating to him. He requests that his bumex dose be scheduled for earlier in the afternoon to avoid this.   He endorses a productive cough that is ongoing, but significantly improved from prior. He denies fevers, chills, or additional respiratory symptoms.   He notes a very good appetite that is improving daily. He feels that his strength is improving.    Review of Systems   The 10 point Review of Systems is negative other than noted in the HPI or here.     Past Medical History    I have reviewed this patient's medical history and updated it with pertinent information if needed.   Past Medical History:   Diagnosis Date     Alcohol abuse      Pierce's esophagus 10/4/2018     Chronic rhinitis 10/4/2018     Chronic systolic heart failure (H) 10/4/2018     Depression 10/4/2018     Diastolic dysfunction      DJD (degenerative joint disease) - neck 10/4/2018     H/O congenital atrial septal defect (ASD) repair at age 3 10/4/2018     Hypothyroidism due to medication 10/4/2018     ICD, [employer] 2008; gen change 2/2018 10/4/2018     Intermittent asthma without complication 10/4/2018     Nonischemic cardiomyopathy (H) 10/4/2018     On amiodarone therapy 10/4/2018     Paroxysmal atrial fibrillation (H) 10/4/2018     S/P ablation of atrial fibrillation 10/4/2018     Systolic heart failure (H)      Ulcerative colitis (H) 10/4/2018     Ventricular tachycardia (H) 10/4/2018       Past Surgical History   I have reviewed this patient's surgical history and updated it with pertinent information if needed.  Past  Surgical History:   Procedure Laterality Date     AICD, DUAL CHAMBER       CV HEART BIOPSY N/A 3/4/2019    Procedure: Heart Biopsy;  Surgeon: Abhijeet Titus MD;  Location:  HEART CARDIAC CATH LAB     CV HEART BIOPSY N/A 3/25/2019    Procedure: Heart Biopsy;  Surgeon: Yves Rodrigues MD;  Location:  HEART CARDIAC CATH LAB     CV HEART BIOPSY N/A 3/28/2019    Procedure: Heart Cath Heart Biopsy;  Surgeon: Yves Rodrigues MD;  Location:  HEART CARDIAC CATH LAB     CV INTRA-AORTIC BALLOON PUMP INSERTION N/A 2/18/2019    Procedure: Intra-Aortic Balloon;  Surgeon: Alton Solomon MD;  Location:  HEART CARDIAC CATH LAB     CV INTRA-AORTIC BALLOON PUMP INSERTION N/A 2/20/2019    Procedure: Replace subclavian IABP;  Surgeon: Yves Rodrigues MD;  Location:  HEART CARDIAC CATH LAB     CV LEFT HEART CATH N/A 3/19/2019    Procedure: Left Heart Cath;  Surgeon: Yves Rodrigues MD;  Location:  HEART CARDIAC CATH LAB     CV RIGHT HEART CATH N/A 3/19/2019    Procedure: RHC/HBx - Femoral access for 3/19 per Nimisha GONZALEZ;  Surgeon: Yves Rodrigues MD;  Location:  HEART CARDIAC CATH LAB     CV RIGHT HEART CATH N/A 3/25/2019    Procedure: Right Heart Cath;  Surgeon: Yves Rodrigues MD;  Location:  HEART CARDIAC CATH LAB     CV RIGHT HEART CATH N/A 3/28/2019    Procedure: Heart Cath Right Heart Cath;  Surgeon: Yves Rodrigues MD;  Location:  HEART CARDIAC CATH LAB     INCISION AND DRAINAGE CHEST WASHOUT, COMBINED N/A 3/21/2019    Procedure: Incision And Drainage; Evacuation Right Chest Wall Hematoma;  Surgeon: Dewayne House MD;  Location: [facility] OR     INSERT INTRAAORTIC BALLOON PUMP Left 2/19/2019    Procedure: Insert left  Subclavian Balloon Pump,  Removal Right femoral arterial balloom pump sheath;  Surgeon: Dewayne House MD;  Location: [facility] OR     INSERT INTRAAORTIC BALLOON PUMP Left 2/21/2019    Procedure: SUBCLAVIAN BALLOON PUMP PLACEMENT;  Surgeon: Ben White  MD;  Location: [facility] OR     TRANSPLANT HEART RECIPIENT N/A 2/24/2019    Procedure: TRANSPLANT HEART RECIPIENT;  Surgeon: Dewayne House MD;  Location: [facility] OR       Social History   I have reviewed this patient's social history and updated it with pertinent information if needed.  Social History     Tobacco Use     Smoking status: Former Smoker     Years: 10.00     Smokeless tobacco: Never Used   Substance Use Topics     Alcohol use: Yes     Alcohol/week: 0.6 oz     Types: 1 Cans of beer per week     Comment: a couple times a week      Drug use: No       Family History   I have reviewed this patient's family history and updated it with pertinent information if needed.   Family History   Problem Relation Age of Onset     Endometrial Cancer Mother      Hypertension Father      Endometrial Cancer Maternal Grandmother        Medications   I have reviewed this patient's current medications    Allergies   Allergies   Allergen Reactions     Hydromorphone Other (See Comments)     Significant Delirium     Lisinopril Other (See Comments)     hypotension     Codeine Nausea       Physical Exam   Vital Signs: Temp: 97.1  F (36.2  C) Temp src: Oral BP: (!) 138/91 Pulse: 85   Resp: 18 SpO2: 100 % O2 Device: None (Room air)    Weight: 118 lbs 6.4 oz    Constitutional: awake, alert, cooperative, no apparent distress, and appears stated age  Respiratory: No increased work of breathing, good air exchange, clear to auscultation bilaterally, no crackles or wheezing  Cardiovascular: Normal apical impulse, regular rate and rhythm, normal S1 and S2, no S3 or S4, and no murmur noted  GI: No scars, normal bowel sounds, soft, non-distended, non-tender, no masses palpated, no hepatosplenomegally, multiple port incision sites on upper abdomen appear to be healing well, no erythema or discharge  Skin: no redness, warmth, or swelling  Musculoskeletal: there is no redness, warmth, or swelling of the joints, 1+ pitting edema of dorsal aspect of  bilateral feet  full range of motion noted  Neurologic: Awake, alert, oriented to name, place and time.  Cranial nerves II-XII are grossly intact.  Motor is 5 out of 5 bilaterally.  Cerebellar finger to nose, heel to shin intact.  Sensory is intact.  Babinski down going, Romberg negative, and gait is normal.  Neuropsychiatric: General: normal, calm and normal eye contact    Data   Most Recent 3 CBC's:  Recent Labs   Lab Test 04/04/19  0840 04/02/19  0514 04/01/19  0653   WBC 8.1 5.9 7.9   HGB 9.1* 7.9* 8.4*   MCV 94 99 100    253 299     Most Recent 3 BMP's:  Recent Labs   Lab Test 04/04/19  0840 04/02/19  0514 04/01/19  0653    137 134   POTASSIUM 4.2 4.1 4.6   CHLORIDE 103 104 104   CO2 22 21 19*   BUN 45* 50* 53*   CR 1.58* 1.85* 2.11*   ANIONGAP 12 12 12   RADAMES 7.9* 7.7* 8.2*   * 96 86

## 2019-04-04 NOTE — PROGRESS NOTES
"  [facility]   Acute Rehabilitation Unit  Daily progress note    interval history  Everton Larios was seen and examined at bedside. Reports fatigue this am notes he was up with frequent urination overnight, he feels related to diuretic he denies dysuria, urgency, suprapubic pain, will move diuretic up a few hours.  Reports good appetite no n/v/d, sob is improved, no fevers.  Pain is minimal, ongoing ble/pedal edema though this is improving.       Functionally, undergoing formal therapy evals today, see rounds note by Dr. Boothe for further details.         medications    aspirin  81 mg Oral Daily     bumetanide  4 mg Oral BID     calcium carbonate 600 mg-vitamin D 400 units  1 tablet Oral BID w/meals     DULoxetine  20 mg Oral Daily     fluticasone  1 puff Inhalation Daily     levothyroxine  100 mcg Oral Daily     magnesium oxide  400 mg Oral BID     melatonin  3-9 mg Oral At Bedtime     mesalamine  2,400 mg Oral BID     montelukast  10 mg Oral At Bedtime     multivitamin w/minerals  1 tablet Oral Daily     mycophenolate  1,500 mg Oral BID IS     nystatin  500,000 Units Oral 4x Daily     omeprazole  40 mg Oral BID AC     potassium chloride ER  40 mEq Oral TID     predniSONE  10 mg Oral QPM     predniSONE  10 mg Oral Daily     rosuvastatin  10 mg Oral Daily     senna-docusate  1 tablet Oral BID     [START ON 4/5/2019] sulfamethoxazole-trimethoprim  1 tablet Oral Daily     tacrolimus  3 mg Oral BID IS     valGANciclovir  450 mg Oral Every Other Day        acetaminophen, albuterol, alum & mag hydroxide-simethicone, azelastine, calcium carbonate, naloxone, oxyCODONE, polyethylene glycol     physical exam  /67 (BP Location: Right arm)   Pulse 93   Temp 97.1  F (36.2  C) (Oral)   Resp 18   Ht 1.727 m (5' 8\")   Wt 53.7 kg (118 lb 6.4 oz)   SpO2 100%   BMI 18.00 kg/m    Gen: awake alert nad  HEENT: mmm  Cardio: reg  Pulm: non labored clear on room air  Abd: soft non " distended non tender  Ext: warm dry mild le/ pedal edema  Neuro/MSK: alert speech clear up ambulating with walker with therapy.     labs  Lab Results   Component Value Date    WBC 8.1 04/04/2019     Lab Results   Component Value Date    RBC 3.16 04/04/2019     Lab Results   Component Value Date    HGB 9.1 04/04/2019     Lab Results   Component Value Date    HCT 29.6 04/04/2019     Lab Results   Component Value Date    MCV 94 04/04/2019     Lab Results   Component Value Date    MCH 28.8 04/04/2019     Lab Results   Component Value Date    MCHC 30.7 04/04/2019     Lab Results   Component Value Date    RDW 18.0 04/04/2019     Lab Results   Component Value Date     04/04/2019     Last Comprehensive Metabolic Panel:  Sodium   Date Value Ref Range Status   04/04/2019 137 133 - 144 mmol/L Final     Potassium   Date Value Ref Range Status   04/04/2019 4.2 3.4 - 5.3 mmol/L Final     Chloride   Date Value Ref Range Status   04/04/2019 103 94 - 109 mmol/L Final     Carbon Dioxide   Date Value Ref Range Status   04/04/2019 22 20 - 32 mmol/L Final     Anion Gap   Date Value Ref Range Status   04/04/2019 12 3 - 14 mmol/L Final     Glucose   Date Value Ref Range Status   04/04/2019 162 (H) 70 - 99 mg/dL Final     Urea Nitrogen   Date Value Ref Range Status   04/04/2019 45 (H) 7 - 30 mg/dL Final     Creatinine   Date Value Ref Range Status   04/04/2019 1.58 (H) 0.66 - 1.25 mg/dL Final     GFR Estimate   Date Value Ref Range Status   04/04/2019 48 (L) >60 mL/min/[1.73_m2] Final     Comment:     Non  GFR Calc  Starting 12/18/2018, serum creatinine based estimated GFR (eGFR) will be   calculated using the Chronic Kidney Disease Epidemiology Collaboration   (CKD-EPI) equation.       Calcium   Date Value Ref Range Status   04/04/2019 7.9 (L) 8.5 - 10.1 mg/dL Final         Rehabilitation - continue comprehensive acute inpatient rehabilitation program with multidisciplinary approach including therapies, rehab  nursing, and physiatry following. See interval history for updates.      assessment and plan    Mr. Everton Larios is 54 year old year old man with a past medical history of hypothyroidism, GERD, BPH, depression, paroxysmal A-fib, NICM s/p OHT 2/24/2019.  Post operative course complicated by FRANKLIN, RV Dysfunction, HCAP and impaired activity tolerance and strength. Admitted to ARU 4/3/2019 for ongoing rehabilitation.    NICM s/p OHT 2/24/2019- with RV dysfunction.    Immunosuppresion: cellcept, tac, pred,   Prophylaxis- nystatin, bactrim, valcyte  Continue crestor, asa   -appreciate ongoing assistance per hosp/cards  -continue bumex  -next biopsy 4/10  -continue low na, & fR.  -sternal precautions- : 20 lbs    FRANKLIN on CKD- felt to be ATN, short course of dialysis.  Cr. Continues to improve.  -trend bmp    HCAP- completing course of  bactrim 4/5    Asthma- breathing stable. continue fluticasone, singulair    Hypothyroidism- continue levothyroxine 100 mcg    Anemia- history of iron deficiency anemia    UC- continue pta mesalamine    Pierce's esophagus- continues ppi    Depression- mood stable. continue cymbalata    Right Chest Wall hematoma- s/p drainage in OR 3/21      1. Adjustment to disability:  Mood stable monitor  2. Fen: reg  3. Bowel: continent  4. Bladder: frequent/continent  5. DVT Prophylaxis: mechanical/ amb  6. GI Prophylaxis: ppi  7. Code: full  8. Disposition: home ~ 10 days  9. ELOS: 10 days  10. Follow up Appointments on Discharge: cv surgery, cardiology        discussed with Dr. Boothe  PM&R Staff Physician    Ann Marie Torres PA-C  Physical Medicine & Rehabilitation   Pager: [phone]

## 2019-04-04 NOTE — PROGRESS NOTES
04/04/19 1007   Quick Adds   Type of Visit Initial PT Evaluation   Living Environment   Lives With alone  (will go to mom and dad's home)   Living Arrangements house   Home Accessibility stairs to enter home;stairs within home   Number of Stairs, Main Entrance 5   Stair Railings, Main Entrance railing on left side (ascending)   Number of Stairs, Within Home, Primary other (see comments)  (12)   Stair Railings, Within Home, Primary railings on both sides of stairs   Transportation Anticipated family or friend will provide   Living Environment Comment His home is accessible; will d/c to parents for about 30 days and need to manage a flight of stairs to access the bedroom.   Self-Care   Usual Activity Tolerance poor   Current Activity Tolerance fair   Regular Exercise No   Equipment Currently Used at Home none   Functional Level Prior   Ambulation 0-->independent   Transferring 0-->independent   Toileting 0-->independent   Bathing 0-->independent   Communication 0-->understands/communicates without difficulty   Swallowing 0-->swallows foods/liquids without difficulty   Cognition 0 - no cognition issues reported   Fall history within last six months yes   Number of times patient has fallen within last six months 2   Which of the above functional risks had a recent onset or change? ambulation;transferring;fall history   Prior Functional Level Comment Limited mobility due to heart failure but ind   General Information   Onset of Illness/Injury or Date of Surgery - Date 02/17/19   Referring Physician Toney Boothe   Patient/Family Goals Statement return to home and be ind w/ taking care of self   Pertinent History of Current Problem (include personal factors and/or comorbidities that impact the POC) initially planned for LVAD but then qualified for transplant   Precautions/Limitations fall precautions;sternal precautions   Heart Disease Risk Factors Family history;Medical history   Cognitive Status Examination    Orientation orientation to person, place and time   Level of Consciousness alert   Follows Commands and Answers Questions 100% of the time;able to follow multistep instructions   Personal Safety and Judgment intact   Memory intact   Pain Assessment   Patient Currently in Pain No   Integumentary/Edema   Integumentary/Edema Comments chest tube poke holes on abdomen, dressing where AICD was removed, sternal incision well healed   Posture    Posture Not impaired   Range of Motion (ROM)   ROM Quick Adds No deficits were identified   MMT: Hip, Rehab Eval   Hip Flexion - Left Side (3+/5) fair plus, left   Hip Extension - Left Side (3+/5) fair plus, left   Hip ABduction - Left Side (3-/5) fair minus, left   Hip Flexion - Right Side (3+/5) fair plus, right   Hip Extension - Right Side (3+/5) fair plus, right   Hip ABduction - Right Side (3-/5) fair minus, right   MMT: Knee, Rehab Eval   Knee Flexion - Left Side (3-/5) fair minus, left   Knee Extension - Left Side (3/5) fair, left   Knee Flexion - Right Side (3-/5) fair minus, right   Knee Extension - Right Side (3/5) fair, right   MMT: Ankle, Rehab Eval   Ankle Dorsiflexion - Left Side (3+/5) fair plus, left   Ankle Plantarflexion - Left Side (3+/5) fair plus, left   Ankle Dorsiflexion - Right Side (3+/5) fair plus, left   Ankle Plantarflexion - Right Side (3+/5) fair plus, left   ARC Assessment Only   Acute Rehab FIM See FIM scores for Mobility/ADL Assessment   Balance   Balance other (describe)   Standing Balance: Static (stands w/o UE support)   Standing Balance: Dynamic (needs UE support due to hip weakness)   Systems Impairment Contributing to Balance Disturbance musculoskeletal   Identified Impairments Contributing to Balance Disturbance decreased strength   Sensory Examination   Sensory Perception Comments intact vibration, monofilament   Coordination   Coordination no deficits were identified   Muscle Tone   Muscle Tone no deficits were identified   General Therapy  Interventions   Planned Therapy Interventions groups;strengthening;risk factor education;home program guidelines;progressive activity/exercise   Clinical Impression   Criteria for Skilled Therapeutic Intervention yes, treatment indicated   PT Diagnosis force production deficit in limbs, trunk, CV system   Influenced by the following impairments LE weakness, CV activity tolerance   Functional limitations due to impairments assist w/ amb and stairs, unable to access preferred leisure activities or work   Clinical Presentation Evolving/Changing   Clinical Decision Making (Complexity) Moderate complexity   Therapy Frequency` 2 times/day   Predicted Duration of Therapy Intervention (days/wks) 10 days   Anticipated Equipment Needs at Discharge walker   Anticipated Discharge Disposition Home with Assist;Home with Outpatient Therapy   Risk & Benefits of therapy have been explained Yes   Patient, Family & other staff in agreement with plan of care Yes   Clinical Impression Comments 53 y/o male s/p heart transplant, recovering well and able to participate in daily therapy, making daily gains.  Would benefit from skilled PT services so he can return home w/ his parents and participate in OP CR.    Total Evaluation Time   Total Evaluation Time (Minutes) 30

## 2019-04-04 NOTE — CONSULTS
"  [facility]   Cardiology II Service / Advanced Heart Failure  Consult Note  Date of Service: 4/4/2019      Patient: Everton Larios  MRN: [id number]  Admission Date: 4/3/2019  Hospital Day # 1    S/p OHT. 2/24/19 complicated by RV dysfunction and FRANKLIN on HD. Echo 3/26/19 consistent with moderate wall thickening with LV hypertrophy, EF 60-65%, moderately dilated RV, and dilated IVC. RHC 3/28/19 mRA-9, RV-35/9, mPA-25, mPCW-18, JOSEPHINE CI-2.48, and JOSEPHINE CO-4.11. Biopsy 3/28/19 OR, PAMR0, weak-moderate C4d staining with ischemia and reperfusion injury.  Serostatus: CMV+, EBV+, HSV1+, HSV2 neg, VZV+  Immunosuppression: Cellcept 1500 mg po BID, Tac 3 mg po BID, level pending from today. Goal-8-10. Prednisone 10 mg po BID.   Prophylaxis:  - Nystatin swish and swallow  - Continue daily Bactrim through 4/5/19, change to bi-weekly thereafter  - Weekly CMV DNA quant per ID negative 4/1/19.  - Continue Crestor. ASA held in setting of bleeding.   - Decrease Bumex to 3 mg po BID. Daily weights.    - Next biopsy due 4/10/19, please arrange transportation from ARU. Consider angiogram pending renal function, defer at this time.   ================================================================  Interval History/ROS: He denies fever, chills, chest pain, SOB, MATA, cough, nausea, and vomiting. He notes improvement in LE edema and diarrhea. He notes improvement in appetite.     Last 24 hr care team notes reviewed.   ROS:  4 point ROS including Respiratory, CV, GI and , other than that noted in the HPI, is negative.     Medications: Reviewed in EPIC.     Physical Exam:   BP (P) 126/85 (BP Location: Left arm)   Pulse (P) 89   Temp 97.1  F (36.2  C) (Oral)   Resp (P) 16   Ht 1.727 m (5' 8\")   Wt 53.7 kg (118 lb 6.4 oz)   SpO2 (P) 100%   BMI 18.00 kg/m    GENERAL: Appears alert and oriented times three.   HEENT: Eye symmetrical and free of discharge bilaterally. Mucous membranes moist and without lesions.  NECK: " Supple and without lymphadenopathy. JVD 8 cm.   CV: RRR, S1S2 present without murmur, rub, or gallop.   RESPIRATORY: Respirations regular, even, and unlabored. Lungs CTA throughout.   GI: Soft and non distended with normoactive bowel sounds present in all quadrants. No tenderness, rebound, guarding. No organomegaly.   EXTREMITIEs: +1 bilateral peripheral edema. 2+ bilateral pedal pulses.   NEUROLOGIC: Alert and orientated x 3. CN II-XII grossly intact. No focal deficits.   MUSCULOSKELETAL: No joint swelling or tenderness.   SKIN: No jaundice. No rashes or lesions.     Data:  CMP  Recent Labs   Lab 04/04/19  0840 04/02/19  0514 04/01/19  0653 03/31/19  0551  03/30/19  0531    137 134 135   < > 134   POTASSIUM 4.2 4.1 4.6 3.6   < > 3.4   CHLORIDE 103 104 104 104   < > 102   CO2 22 21 19* 17*   < > 18*   ANIONGAP 12 12 12 13   < > 14   * 96 86 126*   < > 101*   BUN 45* 50* 53* 61*   < > 70*   CR 1.58* 1.85* 2.11* 2.19*   < > 2.25*   GFRESTIMATED 48* 40* 34* 32*   < > 31*   GFRESTBLACK 56* 46* 39* 38*   < > 36*   RADAMES 7.9* 7.7* 8.2* 7.8*   < > 7.8*   MAG  --  1.5* 1.8 1.9  --  1.7   PHOS  --  3.4 2.5 2.9  --  2.8   PROTTOTAL 5.8*  --  5.6*  --   --   --    ALBUMIN 2.9*  --  2.8*  --   --   --    BILITOTAL 0.6  --  0.6  --   --   --    ALKPHOS 139  --  135  --   --   --    AST 16  --  14  --   --   --    ALT 26  --  28  --   --   --     < > = values in this interval not displayed.     CBC  Recent Labs   Lab 04/04/19  0840 04/02/19  0514 04/01/19  0653 03/31/19  0551   WBC 8.1 5.9 7.9 6.5   RBC 3.16* 2.60* 2.81* 2.49*   HGB 9.1* 7.9* 8.4* 7.6*   HCT 29.6* 25.7* 28.1* 24.5*   MCV 94 99 100 98   MCH 28.8 30.4 29.9 30.5   MCHC 30.7* 30.7* 29.9* 31.0*   RDW 18.0* 18.6* 18.9* 18.7*    253 299 280     Karlene Ames [facility]  4/4/2019

## 2019-04-04 NOTE — PROGRESS NOTES
04/04/19 0819   Quick Adds   Type of Visit Initial Occupational Therapy Evaluation   Living Environment   Lives With alone   Living Arrangements house   Home Accessibility stairs to enter home;stairs within home   Number of Stairs, Main Entrance 5   Stair Railings, Main Entrance railing on left side (ascending)  (for 3 of the stairs)   Number of Stairs, Within Home, Primary other (see comments)  (12)   Stair Railings, Within Home, Primary railings on both sides of stairs   Transportation Anticipated car, drives self;family or friend will provide   Living Environment Comment OT: Pt was living alone in one story townhouse with 1 step to enter. Pt plans to live with parents upon discharge for first month. [facility] house is a 3 story house. Pt has 5 stairs to enter and dozen stairs to get upstairs. Pt will have double size bed. Pt will use a walk in shower at parents, and tub/shower combo at his townhouse. Pt's parents will assist with laundry and it is located in the basement.    Self-Care   Usual Activity Tolerance fair   Current Activity Tolerance fair   Regular Exercise No   Equipment Currently Used at Home none   Activity/Exercise/Self-Care Comment OT: Pt reports stretching BLE's couple times a day. Pt reports limited activity tolerance the last 2-3 months prior to transplant. Pt was working 15-20 hours a week at [employer] up until November 2018.    Functional Level   Ambulation 0-->independent   Transferring 0-->independent   Toileting 0-->independent   Bathing 0-->independent   Dressing 0-->independent   Eating 0-->independent   Communication 0-->understands/communicates without difficulty   Swallowing 0-->swallows foods/liquids without difficulty   Cognition 0 - no cognition issues reported   Fall history within last six months yes   Number of times patient has fallen within last six months 2  (while in the hospital)   Which of the above functional risks had a recent onset or change?  "ambulation;transferring;toileting;bathing;dressing;cognition   Prior Functional Level Comment OT: Pt was IND with ADLs/IADLs and functional mobility.    General Information   Onset of Illness/Injury or Date of Surgery - Date 02/17/19   Referring Physician Dr. Vasquez    Patient/Family Goals Statement OT: \"To get out of here and walk [place].\"   Additional Occupational Profile Info/Pertinent History of Current Problem OT: Per chart review, pt \"OT: Per chart review, pt \"is right-handed gentleman with a past medical history significant for nonischemic cardiomyopathy with an EF of 15% status post ICD placement in 2008, congenital ASD status post repair in childhood, paroxysmal atrial fibrillation status post ablation and cardioversion, who was admitted to the [facility] on 2-17- 2019 with heart failure. As a suitable donor became available, he underwent orthotopic heart transplant by Dr. Ben White. Postoperative course has been significant for delirium, encephalopathy, need for pressors, oliguric AK I requiring CRRT then transitioned to hemodialysis on 14 March. He developed septic shock on the ninth and was started on vancomycin and Zosyn in 14 March. Other issues included hematoma at the site of his prior AICD mandating the withholding of heparin.\"   Precautions/Limitations fall precautions;sternal precautions  (fluid restriction)   Heart Disease Risk Factors Lack of physical activity;Family history;Medical history;Age;Race   Cognitive Status Examination   Orientation orientation to person, place and time   Visual Perception   Visual Perception Comments OT: Pt wears reading glasses   Sensory Examination   Sensory Comments OT: Numbness in BLE's    Pain Assessment   Patient Currently in Pain Yes, see Vital Sign flowsheet  (upper back pain)   Integumentary/Edema   Integumentary/Edema Comments OT: 4+ with RLE and 3+ with LLE   Posture   Posture forward head position;protracted shoulders   Range of Motion (ROM) "   ROM Comment OT: WFL with BUE's all planes   Strength   Strength Comments OT: 3+ with BUE sh.flex and abd, elb. flex/ext, wrist flex/ext,     Hand Strength   Hand Strength Comments OT: not tested d/t sternal precautions   Coordination   Left hand, nine hole peg test (seconds) 28   Right hand, nine hole peg test (seconds) 28   ARC Assessment Only   Acute Rehab FIM See FIM scores for Mobility/ADL Assessment   Instrumental Activities of Daily Living (IADL)   Previous Responsibilities meal prep;housekeeping;laundry;shopping;medication management;finances;driving;work   Activities of Daily Living Analysis   Impairments Contributing to Impaired Activities of Daily Living balance impaired;coordination impaired;fear and anxiety;motor control impaired;pain;post surgical precautions;postural control impaired;strength decreased   General Therapy Interventions   Planned Therapy Interventions ADL retraining;IADL retraining;balance training;bed mobility training;groups;motor coordination training;strengthening;transfer training;home program guidelines;progressive activity/exercise;risk factor education   Clinical Impression   Criteria for Skilled Therapeutic Interventions Met yes, treatment indicated   OT Diagnosis OT: Decreased safety and IND with ADLs and IADLs.    Influenced by the following impairments OT: Impaired strength, impaired balance, deconditioning, sternal precautions   Assessment of Occupational Performance 3-5 Performance Deficits   Identified Performance Deficits OT: Performance deficits include: dressing, toileting, G/H tasks, bathing, and all IADLs   Clinical Decision Making (Complexity) Moderate complexity   Therapy Frequency daily   Predicted Duration of Therapy Intervention (days/wks) 10 days   Anticipated Equipment Needs at Discharge bathing equipment;shower chair;toileting equipment;raised toilet seat   Anticipated Discharge Disposition Home;Home with Assist;Home with Outpatient Therapy   Risks and  Benefits of Treatment have been explained. Yes   Patient, Family & other staff in agreement with plan of care Yes   Clinical Impression Comments OT: Pt would benefit from skilled OT services d/t recent decline in safety and IND with ADLs/IADLs and functional mobility.    Total Evaluation Time   Total Evaluation Time (Minutes) 25

## 2019-04-04 NOTE — PHARMACY-MEDICATION REGIMEN REVIEW
Pharmacy Medication Regimen Review  Everton Larios is a 54 year old male who is currently in the Acute Rehab Unit.    Assessment: All medications have an appropriate indications, durations and no unnecessary use was found    Plan:   Continue current treatment.  Attending provider will be sent this note for review.  If there are any emergent issues noted above, pharmacist will contact provider directly by phone.      Pharmacy will periodically review the resident's medication regimen for any PRN medications not administered in > 72 hours and discontinue them. The pharmacist will discuss gradual dose reductions of psychopharmacologic medications with interdisciplinary team on a regular basis.    Please contact pharmacy if the above does not answer specific medication questions/concerns.    Background:  A pharmacist has reviewed all medications and pertinent medical history today.  Medications were reviewed for appropriate use and any irregularities found are listed with recommendations.      Current Facility-Administered Medications:      acetaminophen (TYLENOL) tablet 650 mg, 650 mg, Oral, Q4H PRN, Budge, Nadja H, PA-C, 650 mg at 04/04/19 1244     albuterol (PROAIR HFA/PROVENTIL HFA/VENTOLIN HFA) 108 (90 Base) MCG/ACT inhaler 2 puff, 2 puff, Inhalation, Q6H PRN, Budge, Nadja H, PA-C     alum & mag hydroxide-simethicone (MYLANTA ES/MAALOX  ES) suspension 30 mL, 30 mL, Oral, Q4H PRN, Budge, Nadja H, PA-C     aspirin (ASA) chewable tablet 81 mg, 81 mg, Oral, Daily, Budge, Nadja H, PA-C, 81 mg at 04/04/19 0743     azelastine (ASTELIN) nasal spray 2 spray, 2 spray, Both Nostrils, BID PRN, Budge, Nadja H, PA-C     bumetanide (BUMEX) tablet 4 mg, 4 mg, Oral, BID, Ann Marie Torres PA     calcium carbonate (TUMS) chewable tablet 500 mg, 500 mg, Oral, Daily PRN, Budge, Nadja H, PA-C     calcium carbonate 600 mg-vitamin D 400 units (CALTRATE) per tablet 1 tablet, 1 tablet, Oral, BID w/meals, Budge,  Nadja H, PA-C, 1 tablet at 04/04/19 0739     DULoxetine (CYMBALTA) EC capsule 20 mg, 20 mg, Oral, Daily, Budge, Nadja H, PA-C, 20 mg at 04/04/19 0741     fluticasone (ARNUITY ELLIPTA) 200 MCG/ACT inhaler 1 puff, 1 puff, Inhalation, Daily, Budge, Nadja H, PA-C, 1 puff at 04/04/19 0746     levothyroxine (SYNTHROID/LEVOTHROID) tablet 100 mcg, 100 mcg, Oral, Daily, Budge, Nadja H, PA-C, 100 mcg at 04/04/19 0743     magnesium oxide (MAG-OX) tablet 400 mg, 400 mg, Oral, BID, Budge, Nadja H, PA-C, 400 mg at 04/04/19 0739     melatonin tablet 3-9 mg, 3-9 mg, Oral, At Bedtime, Budge, Nadja H, PA-C, 9 mg at 04/03/19 2206     mesalamine (ASACOL HD) EC tablet 2,400 mg, 2,400 mg, Oral, BID, Budge, Nadja H, PA-C, 2,400 mg at 04/04/19 0739     montelukast (SINGULAIR) tablet 10 mg, 10 mg, Oral, At Bedtime, Budge, Nadja H, PA-C, 10 mg at 04/03/19 2051     multivitamin w/minerals (THERA-VIT-M) tablet 1 tablet, 1 tablet, Oral, Daily, Budge, Nadja H, PA-C, 1 tablet at 04/04/19 0740     mycophenolate (GENERIC EQUIVALENT) tablet 1,500 mg, 1,500 mg, Oral, BID IS, Budge, Nadja H, PA-C, 1,500 mg at 04/04/19 0743     naloxone (NARCAN) injection 0.1-0.4 mg, 0.1-0.4 mg, Intravenous, Q2 Min PRN, Chucky Boothe MD     nystatin (MYCOSTATIN) suspension 500,000 Units, 500,000 Units, Oral, 4x Daily, Budge, Nadja H, PA-C, 500,000 Units at 04/04/19 1244     omeprazole (priLOSEC) CR capsule 40 mg, 40 mg, Oral, BID AC, Budge, Nadja H, PA-C, 40 mg at 04/04/19 0638     oxyCODONE IR (ROXICODONE) half-tab 2.5 mg, 2.5 mg, Oral, Q4H PRN, Budge, Nadja H, PA-C     polyethylene glycol (MIRALAX/GLYCOLAX) Packet 17 g, 17 g, Oral, Daily PRN, Budge, Nadja H, PA-C     potassium chloride ER (K-DUR/KLOR-CON M) CR tablet 40 mEq, 40 mEq, Oral, TID, Budge, Nadja H, PA-C, 40 mEq at 04/04/19 0742     predniSONE (DELTASONE) tablet 10 mg, 10 mg, Oral, QPM, Budge, Nadja H, PA-C, 10 mg at 04/03/19 2051     predniSONE (DELTASONE) tablet 10 mg, 10  mg, Oral, Daily, Budge, Nadja H, PA-C, 10 mg at 04/04/19 0741     rosuvastatin (CRESTOR) tablet 10 mg, 10 mg, Oral, Daily, Budge, Nadja H, PA-C, 10 mg at 04/04/19 0740     senna-docusate (SENOKOT-S/PERICOLACE) 8.6-50 MG per tablet 1 tablet, 1 tablet, Oral, BID, Budge, Najda H, PA-C     [START ON 4/5/2019] sulfamethoxazole-trimethoprim (BACTRIM/SEPTRA) 400-80 MG per tablet 1 tablet, 1 tablet, Oral, Daily, Budge, Nadja H, PA-C     tacrolimus (GENERIC EQUIVALENT) capsule 3 mg, 3 mg, Oral, BID IS, Budge, Nadja H, PA-C, 3 mg at 04/04/19 0744     valGANciclovir (VALCYTE) tablet 450 mg, 450 mg, Oral, Every Other Day, Budge, Nadja H, PA-C, 450 mg at 04/04/19 0744  No current outpatient prescriptions on file.   PMH:  history significant for nonischemic cardiomyopathy  congenital ASD status post repair in childhood, paroxysmal atrial fibrillation status post ablation and cardioversion, who was admitted to the [facility] on 2-17- 2019 with heart failure.  As a suitable donor became available, he underwent orthotopic heart transplant   Postoperative course has been significant for delirium, encephalopathy, need for pressors, oliguric AK I requiring CRRT then transitioned to hemodialysis on 14 March.

## 2019-04-04 NOTE — PROGRESS NOTES
OT eval completed and tx initiated. Pt previously was IND with ADLs/IADLs and functional mobility. However, pt had poor activity tolerance d/t nonischemic cardiomyopathy. Currently, pt requires SBA with UBD tasks, and min A with LBD tasks. Pt requires min A with toilet transfer with FWW with commode overlay. SBA with G/H tasks standing at EOS with FWW. Pt requires cuing to adhere to sternal precautions.     Anticipate LOS 10 days with f/u OP cardiac rehab.   Pt may need RTS, shower chair, and grab bars

## 2019-04-04 NOTE — PLAN OF CARE
FOCUS/GOAL  Mobility    ASSESSMENT, INTERVENTIONS AND CONTINUING PLAN FOR GOAL:  Pt is A&Ox4, VSS and reported pain to incision that was relieved by tylenol PRN. Pt is a 1 staff assist w/ walker and is continent using toilet, no BM this shift. Pt is on 2000 mL fluid restrictions. Pt has 2 incisions on chest, the transplant incision is ELEONORA and pacemaker site is covered w/ CDI bandage, there are also 3 smaller incisions on abd, all sites are free of bleeding/drainage or Sx of infection.

## 2019-04-04 NOTE — PLAN OF CARE
Acute Rehab Care Conference/Team Rounds      Type: Team Rounds    Present: Dr. Chucky Boothe, Ann Marie Torres PA, Nadja Sood LICSW, Gosia Rivas OT, Xiomara Davis PT, Kayce Noyola SLP, Santino Bragg RN, [place]i Stark Dietician, Keiko Summers , Cirilo Begum      Discharge Barriers/Treatment/Education    Rehab Diagnosis: Heart transplant    Active Medical Co-morbidities/Prognosis: Immunosuppression, hypertension, pulmonary hypertension, hypothyroidism, insomnia    Safety:    Pain:    Medications, Skin, Tubes/Lines:    Swallowing/Nutrition:    Bowel/Bladder:    Psychosocial: Pending assessment.     ADLs/IADLs: Pt's initial evaluation completed today. Pt requires SBA with UBD tasks, and min A with LBD tasks. Pt requires min A with toilet transfer with FWW with commode overlay. SBA with G/H tasks standing at EOS with FWW. Pt requires cuing to adhere to sternal precautions. Anticipate LOS 10 days with f/u OP cardiac rehab.     Mobility: Pt is 1x assist w/ walker and requires no assistance to stand ad transfer; needs elevated sitting heights for ease of transfer and does need lifting assist from standard height chairs. Walking endurance 75', will plan to do timed walking test this afternoon.  Recommend IP care x ~10 days w/ OP CR f/u.     Cognition/Language: Pt speaks clearly and can make his needs kown appropriately.    Decision maker: self    Plan of Care and goals reviewed and updated.    Discharge Plan/Recommendations    Fall Precautions: continue    Overall plan for the patient: Everton Just starting full therapy evaluations, thus far participating well. Not following sternal precautions well but is within 3 days of lifting his 6 week sternal precautions liberating to 20 pounds.  Weakness pattern has symmetric proximal predominant.  Does have 5+12 stairs to access parents home where he plans to discharge in [place].  Tentative discharge estimate 4/12. Care conference outline for  4/10. Next step therapy is to be determined, ideally outpatient cardiac rehab [facility].      Utilization Review and Continued Stay Justification    Medical Necessity Criteria:    For any criteria that is not met, please document reason and plan for discharge, transfer, or modification of plan of care to address.    Requires intensive rehabilitation program to treat functional deficits?: Yes    Requires 3x per week or greater involvement of rehabilitation physician to oversee rehabilitation program?: Yes    Requires rehabilitation nursing interventions?: Yes    Patient is making functional progress?: Yes    There is a potential for additional functional progress? Yes    Patient is participating in therapy 3 hours per day a minimum of 5 days per week or 15 hours per week in 7 day period?:Yes    Has discharge needs that require coordinated discharge planning approach?:Yes      Final Physician Sign off    Statement of Approval: I agree with all the recommendations detailed in this document.      Patient Goals     1. Pt will discharge home/community setting upon completion of therapy/RN goals.  2. Pt and family will be involved in discharge planning and will be made aware of services available to meet pts needs.            OT eval in progress       PT Frequency: daily x 90 minutes     PT goal: transfers: Modified independent  PT goal: gait: Modified independent, Greater than 200 feet  PT goal: stairs: Modified independent, Greater than 10 stairs  PT goal: perform aerobic activity with stable cardiovascular response: continuous activity, 15 minutes, NuStep     PT goal 1: car transfer ind  PT Goal 2: ind w/ HEP for LE strengthening and aerobic conditioning      Goal: Wound Management: Pt will demonstrate proper technique to clean and prevent infection in all incision and wound sites   Pt has approximated wounds on abd that are free of bleeding/drainage or ay Sx of infection, ELEONORA ad Pt denies pain to these sites.      Patient/Family Goal: Medication Management: Pt will successfully complete map prior to discharge to show understanding of medicationr regimen   Pt understands his medications and is able to state what each medication is indicated for    Individualized Goal 1: Pt will complete transplant education prior to discharge to successfully manage transplant at home.   Pt will need to complete post transplant class w/ PLC.

## 2019-04-04 NOTE — PLAN OF CARE
Pt admitted today from [place]. A&Ox4, denied pain. Ao1 using walker, mod assist from sit to stand then ambulates well. Vitals stable. Continent of b&b, using urinal standing in bathroom or at bedside. PVRs completed x3 <100mL. Chest incisions ELEONORA healing well. Red blanchable erythema to coccyx, encouraging patient to reposition off bottom. Remains on 2gm NA diet with 2L fluid restriction, drank about 380mL this shift.  Edema to bilateral LE, worse on R foot, using smaller teds and elevating on pillows. Alarms on for safety, pt calls appropriately.

## 2019-04-05 PROCEDURE — 25000132 ZZH RX MED GY IP 250 OP 250 PS 637: Performed by: PHYSICIAN ASSISTANT

## 2019-04-05 PROCEDURE — 25000131 ZZH RX MED GY IP 250 OP 636 PS 637: Performed by: PHYSICIAN ASSISTANT

## 2019-04-05 PROCEDURE — 25000132 ZZH RX MED GY IP 250 OP 250 PS 637: Performed by: NURSE PRACTITIONER

## 2019-04-05 PROCEDURE — 97110 THERAPEUTIC EXERCISES: CPT | Mod: GP | Performed by: PHYSICAL THERAPIST

## 2019-04-05 PROCEDURE — 12800006 ZZH R&B REHAB

## 2019-04-05 PROCEDURE — 99232 SBSQ HOSP IP/OBS MODERATE 35: CPT | Performed by: PHYSICIAN ASSISTANT

## 2019-04-05 PROCEDURE — 97530 THERAPEUTIC ACTIVITIES: CPT | Mod: GO | Performed by: OCCUPATIONAL THERAPIST

## 2019-04-05 PROCEDURE — 97110 THERAPEUTIC EXERCISES: CPT | Mod: GO | Performed by: OCCUPATIONAL THERAPIST

## 2019-04-05 PROCEDURE — 97535 SELF CARE MNGMENT TRAINING: CPT | Mod: GO | Performed by: OCCUPATIONAL THERAPIST

## 2019-04-05 PROCEDURE — 99207 ZZC CDG-MDM COMPONENT: MEETS LOW - DOWN CODED: CPT | Performed by: PHYSICIAN ASSISTANT

## 2019-04-05 RX ORDER — SULFAMETHOXAZOLE AND TRIMETHOPRIM 400; 80 MG/1; MG/1
1 TABLET ORAL
Status: DISCONTINUED | OUTPATIENT
Start: 2019-04-06 | End: 2019-04-08

## 2019-04-05 RX ADMIN — MELATONIN TAB 3 MG 3 MG: 3 TAB at 20:42

## 2019-04-05 RX ADMIN — MYCOPHENOLATE MOFETIL 1500 MG: 500 TABLET ORAL at 09:45

## 2019-04-05 RX ADMIN — Medication 500000 UNITS: at 20:47

## 2019-04-05 RX ADMIN — Medication 500000 UNITS: at 16:26

## 2019-04-05 RX ADMIN — DULOXETINE HYDROCHLORIDE 20 MG: 20 CAPSULE, DELAYED RELEASE ORAL at 09:49

## 2019-04-05 RX ADMIN — TACROLIMUS 3 MG: 1 CAPSULE ORAL at 18:30

## 2019-04-05 RX ADMIN — MESALAMINE 2400 MG: 800 TABLET, DELAYED RELEASE ORAL at 09:52

## 2019-04-05 RX ADMIN — FLUTICASONE FUROATE 1 PUFF: 200 POWDER RESPIRATORY (INHALATION) at 09:52

## 2019-04-05 RX ADMIN — MESALAMINE 2400 MG: 800 TABLET, DELAYED RELEASE ORAL at 20:42

## 2019-04-05 RX ADMIN — POTASSIUM CHLORIDE 40 MEQ: 750 TABLET, EXTENDED RELEASE ORAL at 12:14

## 2019-04-05 RX ADMIN — Medication 500000 UNITS: at 09:54

## 2019-04-05 RX ADMIN — ASPIRIN 81 MG CHEWABLE TABLET 81 MG: 81 TABLET CHEWABLE at 09:43

## 2019-04-05 RX ADMIN — ACETAMINOPHEN 650 MG: 325 TABLET, FILM COATED ORAL at 14:58

## 2019-04-05 RX ADMIN — POTASSIUM CHLORIDE 40 MEQ: 750 TABLET, EXTENDED RELEASE ORAL at 20:41

## 2019-04-05 RX ADMIN — BUMETANIDE 3 MG: 2 TABLET ORAL at 09:46

## 2019-04-05 RX ADMIN — OMEPRAZOLE 40 MG: 20 CAPSULE, DELAYED RELEASE ORAL at 06:00

## 2019-04-05 RX ADMIN — CALCIUM CARBONATE 600 MG (1,500 MG)-VITAMIN D3 400 UNIT TABLET 1 TABLET: at 12:14

## 2019-04-05 RX ADMIN — LEVOTHYROXINE SODIUM 100 MCG: 100 TABLET ORAL at 09:48

## 2019-04-05 RX ADMIN — TACROLIMUS 3 MG: 1 CAPSULE ORAL at 09:44

## 2019-04-05 RX ADMIN — MULTIPLE VITAMINS W/ MINERALS TAB 1 TABLET: TAB at 09:50

## 2019-04-05 RX ADMIN — OMEPRAZOLE 40 MG: 20 CAPSULE, DELAYED RELEASE ORAL at 16:25

## 2019-04-05 RX ADMIN — MAGNESIUM OXIDE TAB 400 MG (241.3 MG ELEMENTAL MG) 400 MG: 400 (241.3 MG) TAB at 20:42

## 2019-04-05 RX ADMIN — Medication 500000 UNITS: at 12:15

## 2019-04-05 RX ADMIN — ROSUVASTATIN CALCIUM 10 MG: 10 TABLET, FILM COATED ORAL at 09:49

## 2019-04-05 RX ADMIN — MAGNESIUM OXIDE TAB 400 MG (241.3 MG ELEMENTAL MG) 400 MG: 400 (241.3 MG) TAB at 09:51

## 2019-04-05 RX ADMIN — BUMETANIDE 3 MG: 2 TABLET ORAL at 14:58

## 2019-04-05 RX ADMIN — PREDNISONE 10 MG: 10 TABLET ORAL at 18:31

## 2019-04-05 RX ADMIN — POTASSIUM CHLORIDE 40 MEQ: 750 TABLET, EXTENDED RELEASE ORAL at 16:26

## 2019-04-05 RX ADMIN — MYCOPHENOLATE MOFETIL 1500 MG: 500 TABLET ORAL at 18:31

## 2019-04-05 RX ADMIN — CALCIUM CARBONATE 600 MG (1,500 MG)-VITAMIN D3 400 UNIT TABLET 1 TABLET: at 18:31

## 2019-04-05 RX ADMIN — MONTELUKAST SODIUM 10 MG: 10 TABLET, COATED ORAL at 20:42

## 2019-04-05 RX ADMIN — PREDNISONE 10 MG: 10 TABLET ORAL at 09:48

## 2019-04-05 ASSESSMENT — MIFFLIN-ST. JEOR: SCORE: 1323.87

## 2019-04-05 NOTE — PLAN OF CARE
FOCUS/GOAL  Medical management    ASSESSMENT, INTERVENTIONS AND CONTINUING PLAN FOR GOAL:  Pt appeared to be sleeping well on rounds, however, this AM reported that he slept poorly overnight. He attributed his poor sleep to needing to void multiple times overnight, per CARMEN he was up x2 to void.  Denied any pain or SOB this shift. Ambulates with assist of one and walker. Continent of bladder and bowel using both urinal and toilet. Calls appropriately. Call light within reach.     Unhappy with diet this AM when ordering breakfast. Dietary contacted nursing unit stating that the pt was swearing and yelling at them when he was unable to order what he wanted. Patient also swearing and yelling at writer when attempted to discuss diet with him/ order breakfast. Day shift RN updated. May benefit from dietary consult.

## 2019-04-05 NOTE — PROGRESS NOTES
[facility]   Acute Rehabilitation Unit  Daily progress note    interval history  Everton Larios was seen resting in bed, very frustrated by limitations of low salt, low fat diet and feels unable to order anything he wants, discussed heart healthy diet and importance of long term diet/lifestyle changes, diet order adjusted to regular during aru stay. Everton reports urinary frequency again limiting sleep, he also acknowledges steroids could be playing a part as well.  Reports therapy went well yesterday though limited this am as unable to eat due to diet today. Notes mild ble edema, breathing improved, denies pain, sob, headaches, dizziness, n/v/. Reports regular bowel movements, feels urinary frequency due to diuretics is without odor, suprapubic pain, or dysuria .         medications    aspirin  81 mg Oral Daily     bumetanide  3 mg Oral BID     calcium carbonate 600 mg-vitamin D 400 units  1 tablet Oral BID w/meals     DULoxetine  20 mg Oral Daily     fluticasone  1 puff Inhalation Daily     levothyroxine  100 mcg Oral Daily     magnesium oxide  400 mg Oral BID     melatonin  3-9 mg Oral At Bedtime     mesalamine  2,400 mg Oral BID     montelukast  10 mg Oral At Bedtime     multivitamin w/minerals  1 tablet Oral Daily     mycophenolate  1,500 mg Oral BID IS     nystatin  500,000 Units Oral 4x Daily     omeprazole  40 mg Oral BID AC     potassium chloride ER  40 mEq Oral TID     predniSONE  10 mg Oral QPM     predniSONE  10 mg Oral Daily     rosuvastatin  10 mg Oral Daily     [START ON 4/6/2019] sulfamethoxazole-trimethoprim  1 tablet Oral Once per day on Tue Sat     tacrolimus  3 mg Oral BID IS     valGANciclovir  450 mg Oral Every Other Day        acetaminophen, albuterol, alum & mag hydroxide-simethicone, azelastine, calcium carbonate, naloxone, oxyCODONE, polyethylene glycol, senna-docusate     physical exam  BP (!) 136/92 (BP Location: Left arm)   Pulse 86   Temp 97.6  " F (36.4  C) (Oral)   Resp 20   Ht 1.727 m (5' 8\")   Wt 53.7 kg (118 lb 6.4 oz)   SpO2 100%   BMI 18.00 kg/m    Gen: awake alert nad  HEENT: mmm  Cardio: reg  Pulm: non labored clear on room air  Abd: soft non distended non tender  Ext: warm dry mild le/ pedal edema  Neuro/MSK: alert speech clear moves all extremities.    labs  Lab Results   Component Value Date    WBC 8.1 04/04/2019     Lab Results   Component Value Date    RBC 3.16 04/04/2019     Lab Results   Component Value Date    HGB 9.1 04/04/2019     Lab Results   Component Value Date    HCT 29.6 04/04/2019     Lab Results   Component Value Date    MCV 94 04/04/2019     Lab Results   Component Value Date    MCH 28.8 04/04/2019     Lab Results   Component Value Date    MCHC 30.7 04/04/2019     Lab Results   Component Value Date    RDW 18.0 04/04/2019     Lab Results   Component Value Date     04/04/2019     Last Comprehensive Metabolic Panel:  Sodium   Date Value Ref Range Status   04/04/2019 137 133 - 144 mmol/L Final     Potassium   Date Value Ref Range Status   04/04/2019 4.2 3.4 - 5.3 mmol/L Final     Chloride   Date Value Ref Range Status   04/04/2019 103 94 - 109 mmol/L Final     Carbon Dioxide   Date Value Ref Range Status   04/04/2019 22 20 - 32 mmol/L Final     Anion Gap   Date Value Ref Range Status   04/04/2019 12 3 - 14 mmol/L Final     Glucose   Date Value Ref Range Status   04/04/2019 162 (H) 70 - 99 mg/dL Final     Urea Nitrogen   Date Value Ref Range Status   04/04/2019 45 (H) 7 - 30 mg/dL Final     Creatinine   Date Value Ref Range Status   04/04/2019 1.58 (H) 0.66 - 1.25 mg/dL Final     GFR Estimate   Date Value Ref Range Status   04/04/2019 48 (L) >60 mL/min/[1.73_m2] Final     Comment:     Non  GFR Calc  Starting 12/18/2018, serum creatinine based estimated GFR (eGFR) will be   calculated using the Chronic Kidney Disease Epidemiology Collaboration   (CKD-EPI) equation.       Calcium   Date Value Ref Range Status "   04/04/2019 7.9 (L) 8.5 - 10.1 mg/dL Final         Rehabilitation - continue comprehensive acute inpatient rehabilitation program with multidisciplinary approach including therapies, rehab nursing, and physiatry following. See interval history for updates.      assessment and plan    Mr. Everton Larios is 54 year old year old man with a past medical history of hypothyroidism, GERD, BPH, depression, paroxysmal A-fib, NICM s/p OHT 2/24/2019.  Post operative course complicated by FRANKLIN, RV Dysfunction, HCAP and impaired activity tolerance and strength. Admitted to ARU 4/3/2019 for ongoing rehabilitation.    NICM s/p OHT 2/24/2019- with RV dysfunction.    Immunosuppresion: cellcept, tac, pred,   Prophylaxis- nystatin, bactrim, valcyte (titrate pending renal function)  Continue crestor, asa   -appreciate ongoing assistance per hosp/cards  -continue bumex 3mg bid- titrate pending weights/edema  -daily weights.  -next biopsy 4/10  -continue reg diet, and fluid restriction.   -sternal precautions- : 20 lbs    FRANKLIN on CKD- felt to be ATN, short course of dialysis.  Cr. Continues to improve. 1.58 4/4.   -trend bmp    HCAP- completed course of  Bactrim today 4/5- breathing improved per patient report.     Asthma- breathing stable/improved. continue fluticasone, singulair    Hypothyroidism- continue levothyroxine 100 mcg    Anemia- history of iron deficiency anemia.  Hgb 9.1 4/4 for 7.9 4/2- will trend.     UC- continue pta mesalamine    Pierce's esophagus- continues ppi    Depression- mood stable. continue cymbalata    Right Chest Wall hematoma- s/p drainage in OR 3/21      1. Adjustment to disability:  Mood stable monitor  2. Fen: reg  3. Bowel: continent  4. Bladder: frequent/continent  5. DVT Prophylaxis: mechanical/ amb  6. GI Prophylaxis: ppi  7. Code: full  8. Disposition: home ~ 10 days  9. ELOS: 10 days  10. Follow up Appointments on Discharge: cv surgery, cardiology        discussed with Dr. Boothe  PM&R Staff  Physician    Ann Marie Torres PA-C  Physical Medicine & Rehabilitation   Pager: [phone]

## 2019-04-05 NOTE — PLAN OF CARE
Pt was very angry this morning when he tried placing his breakfast order. Was reminded of his diet restrictions- low saturated fat Na < 2400 mg. Pt yelling at dietary and was then sent an inpatient dietician who he was mad at also. Pt states that the food that he wants, he cannot have and so has nothing to eat at this time. Did agree and took most of his medications this am. Wants to speak with his MD re: current diet orders. Pt got a little nauseated after therapy and states it is because he did not eat breakfast and then exercise. Pt informed that pharmacy wants to come and talked to him about his meds- he is declining to speak with pharmacy for now due to diet concerns. Plan is to make MD aware of pt's concerns, monitor his nausea, have dietary sent a low sat. Fat Na diet for pt to view and make better choices.

## 2019-04-05 NOTE — PROGRESS NOTES
"Clinical Nutrition Services Brief Note    Per discussion with RN and rehab, pt is complaining about diet order (low sat fat, low sodium), stating that he can't order any of the foods that he wants. Pt was swearing and yelling at dietary when ordering food on the phone.   Writer went to discuss current diet order to pt. Informed pt that this was ordered by provider and may be able to discuss with provider potential to change to Regular diet. Writer provided pt with a menu that contained highlighted items that are low in sodium and highlighted items that are low in saturated fat. Pt can use this in the meantime while diet order is low saturated fat, low sodium. Writer explained that daily dietary limits are divided into three meals (800 mg sodium, 5 g saturated fat). Patient proceeded to raise voice at writer, stating that writer was being condescending and lying to him and saying, \"I can count.\" Rehab had paged provider to discuss this.     Zoë Santiago RD, LD  Unit pgr: [phone]    "

## 2019-04-05 NOTE — PLAN OF CARE
Pt a&ox4, denied pain this shift. Ao1 using walker and continent of bladder using urinal standing. Pt following fluid restriction of 2L and 2400mg NA diet. Scheduled tranplant PLC with patient and his mother Monday 4/8 at 1130, pt made aware of this. Incisions healing well, skin tears noted on patients hands/arms, monitoring for bleeding. Requests pudding and ice cream with k-dur medication administration. No alarms on, pt calls approprietly. Shower in AM with therapy.

## 2019-04-05 NOTE — PROGRESS NOTES
Transitional Care Unit  Progress Note  Anai Collins PA-C  4/5/19          Assessment & Plan:   Everton Larios is a 54 year old male admitted to ARU on 4/3/2019. He has a pmh of hypothyroidism, mild intermittent asthma, UC, depression, ETOH abuse, hypothyroidism, GERD, BPH, depression, paroxysmal Afib s/p ablation and cardioversion,  NICM c/b cardiogenic shock s/p IABP w/ subsequent OHT 2/24/19. Post op course c/b RV dysfunction, FRANKLIN on CKD requiring CRRT 3/8/19 transitioned to HD 3/14 (off 3/29), and HCAP. Discharged to ARU on 4/3 for ongoing therapies.      # NICM s/p OHT 2/24/19. Initially admitted to [facility] due to heart failure 2/2 non-ischemic cardiomyopathy on 2/17/19. Required assistance of intra-aortic balloon placement for hemodynamic support on 2/19/19. A donor heart became available and on 2/24/19 patient received OHT. Post operative course complicated by delirium (resolved),FRANKLIN (see below) and hematoma at previous AICD which required washout and drain placement on 3/21/19.   - Immunosuppression: cellcept 1500 mg BID, tacro 3 mg BID  - Prednisone 10 mg BID  - Tacro level q M/Th  - Qweekly CMV, next 4/8   - Nystatin for fungal ppx, valcyte every other day, Bactrim bi-weekly  - Daily weights     FRANKLIN on CKD. Post op developed oliguric FRANKLIN thought 2/2 nephrotoxicity associated with tacrolimus and diuretic use.  and was started on CRRT 3/8 and transitioned to HD 3/14. He ultimately had renal recovery with end of HD 3/23. BL creatinine 1.1-1.4. Most recent Cr 1.58 (4/4).  - Avoid nephrotixins  - Continue Bumex BID for volume (decreased 4/4)  - BMP, Mag, Phos qM/Th     Klebsiella oxytoca pneumonia. dx by sputum cx 3/20/19. CXR 3/22/19 with evidence of retrocardiac opacity, CXR 3/1/9 with nodular groundglass opacities in the R lung. Started zosyn 3/21 with clinical improvement, transitioned to PO bactrim 3/23 following sensitivities, completed treatment course 4/5.     UC. Continue mesalamine.  "  Hypothyroidism. Continue PTA levothyroxine.  BPH. Continue tamsulosin.  Depression. Continue duloxetine  GERD. Continue omeprazole  Mild Intermittent Asthma. Continue fluticasone, montelucast             Nadja Collins PA-C  Hospitalist Service  [phone]          Interval History:   Slight improvement in pedal swelling today. Some shortness of breath with activity, easily fatigued, but no chest pain, or difficulty breathing while at rest. Good appetite. No abdominal pain, fevers, chills.            Physical Exam:   Blood pressure (!) 136/92, pulse 86, temperature 97.6  F (36.4  C), temperature source Oral, resp. rate 20, height 1.727 m (5' 8\"), weight 53.7 kg (118 lb 6.4 oz), SpO2 100 %.  GENERAL: Awake. Appears to be resting comfortably. NAD.   HEENT: NC/AT. Anicteric sclera. Mucous membranes moist.  NECK: Supple   CV: RRR, S1S2. No appreciable murmurs, rubs, or gallops.   RESPIRATORY: Normal respiratory effort on RA. Lungs CTAB without wheezes, rales or rhonchi.   GI: Soft, non-tender, and non-distended with bowel sounds present in all quadrants.  EXTREMITIES: No peripheral edema. Warm & well perfused.  NEUROLOGIC: Alert and orientated x 3. CN II-XII grossly intact. No focal deficits.   MUSCULOSKELETAL: No joint swelling or tenderness.   SKIN: No jaundice. No rashes or lesions.       "

## 2019-04-05 NOTE — PLAN OF CARE
Pt was now able to order 'what he wanted' and is now happy and in a better space- a complete turn around! Pt was able to take the rest of his morning medications. Has no more nausea and is pleasant to converse with. Ate 100% of meal. Will continue to monitor. Low saturated fat Na diet menu arrived from diet office and was placed in pt's chart at the  for now.

## 2019-04-05 NOTE — PROGRESS NOTES
OT: Completed full shower and ADLs. Pt upset throughout session d/t change in diet and swearing throughout.  Provided size E spandi  for BLE edema.     -5 d/t morning huddle running late.   -5 PM session d/t fatigue

## 2019-04-06 ENCOUNTER — APPOINTMENT (OUTPATIENT)
Dept: GENERAL RADIOLOGY | Facility: CLINIC | Age: 54
DRG: 949 | End: 2019-04-06
Attending: PHYSICAL MEDICINE & REHABILITATION
Payer: COMMERCIAL

## 2019-04-06 LAB
TACROLIMUS BLD-MCNC: 8.1 UG/L (ref 5–15)
TME LAST DOSE: NORMAL H

## 2019-04-06 PROCEDURE — 12800006 ZZH R&B REHAB

## 2019-04-06 PROCEDURE — 36415 COLL VENOUS BLD VENIPUNCTURE: CPT | Performed by: NURSE PRACTITIONER

## 2019-04-06 PROCEDURE — 40000893 ZZH STATISTIC PT IP EVAL DEFER: Performed by: PHYSICAL THERAPIST

## 2019-04-06 PROCEDURE — 97530 THERAPEUTIC ACTIVITIES: CPT | Mod: GO | Performed by: OCCUPATIONAL THERAPIST

## 2019-04-06 PROCEDURE — 99207 ZZC CDG-MDM COMPONENT: MEETS LOW - DOWN CODED: CPT | Performed by: PHYSICIAN ASSISTANT

## 2019-04-06 PROCEDURE — 97110 THERAPEUTIC EXERCISES: CPT | Mod: GP | Performed by: PHYSICAL THERAPIST

## 2019-04-06 PROCEDURE — 25000131 ZZH RX MED GY IP 250 OP 636 PS 637: Performed by: PHYSICIAN ASSISTANT

## 2019-04-06 PROCEDURE — 97535 SELF CARE MNGMENT TRAINING: CPT | Mod: GO | Performed by: OCCUPATIONAL THERAPIST

## 2019-04-06 PROCEDURE — 71100 X-RAY EXAM RIBS UNI 2 VIEWS: CPT | Mod: LT

## 2019-04-06 PROCEDURE — 25000132 ZZH RX MED GY IP 250 OP 250 PS 637: Performed by: PHYSICIAN ASSISTANT

## 2019-04-06 PROCEDURE — 73502 X-RAY EXAM HIP UNI 2-3 VIEWS: CPT

## 2019-04-06 PROCEDURE — 93010 ELECTROCARDIOGRAM REPORT: CPT | Performed by: INTERNAL MEDICINE

## 2019-04-06 PROCEDURE — 93005 ELECTROCARDIOGRAM TRACING: CPT

## 2019-04-06 PROCEDURE — 80197 ASSAY OF TACROLIMUS: CPT | Performed by: NURSE PRACTITIONER

## 2019-04-06 PROCEDURE — 99232 SBSQ HOSP IP/OBS MODERATE 35: CPT | Performed by: PHYSICIAN ASSISTANT

## 2019-04-06 PROCEDURE — 25000132 ZZH RX MED GY IP 250 OP 250 PS 637: Performed by: NURSE PRACTITIONER

## 2019-04-06 PROCEDURE — 97110 THERAPEUTIC EXERCISES: CPT | Mod: GO | Performed by: OCCUPATIONAL THERAPIST

## 2019-04-06 PROCEDURE — 25000132 ZZH RX MED GY IP 250 OP 250 PS 637: Performed by: STUDENT IN AN ORGANIZED HEALTH CARE EDUCATION/TRAINING PROGRAM

## 2019-04-06 RX ORDER — ACETAMINOPHEN 325 MG/1
975 TABLET ORAL ONCE
Status: COMPLETED | OUTPATIENT
Start: 2019-04-06 | End: 2019-04-06

## 2019-04-06 RX ADMIN — LEVOTHYROXINE SODIUM 100 MCG: 100 TABLET ORAL at 07:58

## 2019-04-06 RX ADMIN — MESALAMINE 2400 MG: 800 TABLET, DELAYED RELEASE ORAL at 20:12

## 2019-04-06 RX ADMIN — MESALAMINE 2400 MG: 800 TABLET, DELAYED RELEASE ORAL at 08:00

## 2019-04-06 RX ADMIN — MAGNESIUM OXIDE TAB 400 MG (241.3 MG ELEMENTAL MG) 400 MG: 400 (241.3 MG) TAB at 20:12

## 2019-04-06 RX ADMIN — MYCOPHENOLATE MOFETIL 1500 MG: 500 TABLET ORAL at 17:27

## 2019-04-06 RX ADMIN — FLUTICASONE FUROATE 1 PUFF: 200 POWDER RESPIRATORY (INHALATION) at 07:59

## 2019-04-06 RX ADMIN — VALGANCICLOVIR 450 MG: 450 TABLET, FILM COATED ORAL at 08:00

## 2019-04-06 RX ADMIN — TACROLIMUS 3 MG: 1 CAPSULE ORAL at 07:56

## 2019-04-06 RX ADMIN — ROSUVASTATIN CALCIUM 10 MG: 10 TABLET, FILM COATED ORAL at 07:58

## 2019-04-06 RX ADMIN — MULTIPLE VITAMINS W/ MINERALS TAB 1 TABLET: TAB at 08:08

## 2019-04-06 RX ADMIN — POTASSIUM CHLORIDE 40 MEQ: 750 TABLET, EXTENDED RELEASE ORAL at 14:39

## 2019-04-06 RX ADMIN — Medication 500000 UNITS: at 14:39

## 2019-04-06 RX ADMIN — ASPIRIN 81 MG CHEWABLE TABLET 81 MG: 81 TABLET CHEWABLE at 07:57

## 2019-04-06 RX ADMIN — MYCOPHENOLATE MOFETIL 1500 MG: 500 TABLET ORAL at 07:56

## 2019-04-06 RX ADMIN — CALCIUM CARBONATE 600 MG (1,500 MG)-VITAMIN D3 400 UNIT TABLET 1 TABLET: at 17:28

## 2019-04-06 RX ADMIN — Medication 500000 UNITS: at 20:12

## 2019-04-06 RX ADMIN — ACETAMINOPHEN 975 MG: 325 TABLET, FILM COATED ORAL at 17:28

## 2019-04-06 RX ADMIN — DULOXETINE HYDROCHLORIDE 20 MG: 20 CAPSULE, DELAYED RELEASE ORAL at 07:57

## 2019-04-06 RX ADMIN — PREDNISONE 10 MG: 10 TABLET ORAL at 07:58

## 2019-04-06 RX ADMIN — CALCIUM CARBONATE 600 MG (1,500 MG)-VITAMIN D3 400 UNIT TABLET 1 TABLET: at 08:08

## 2019-04-06 RX ADMIN — Medication 500000 UNITS: at 07:59

## 2019-04-06 RX ADMIN — MONTELUKAST SODIUM 10 MG: 10 TABLET, COATED ORAL at 20:12

## 2019-04-06 RX ADMIN — MAGNESIUM OXIDE TAB 400 MG (241.3 MG ELEMENTAL MG) 400 MG: 400 (241.3 MG) TAB at 08:00

## 2019-04-06 RX ADMIN — TACROLIMUS 3 MG: 1 CAPSULE ORAL at 17:28

## 2019-04-06 RX ADMIN — Medication 500000 UNITS: at 17:27

## 2019-04-06 RX ADMIN — OMEPRAZOLE 40 MG: 20 CAPSULE, DELAYED RELEASE ORAL at 17:28

## 2019-04-06 RX ADMIN — PREDNISONE 10 MG: 10 TABLET ORAL at 17:28

## 2019-04-06 RX ADMIN — POTASSIUM CHLORIDE 40 MEQ: 750 TABLET, EXTENDED RELEASE ORAL at 07:56

## 2019-04-06 RX ADMIN — BUMETANIDE 3 MG: 2 TABLET ORAL at 14:39

## 2019-04-06 RX ADMIN — MELATONIN TAB 3 MG 6 MG: 3 TAB at 20:13

## 2019-04-06 RX ADMIN — POTASSIUM CHLORIDE 40 MEQ: 750 TABLET, EXTENDED RELEASE ORAL at 20:12

## 2019-04-06 RX ADMIN — OMEPRAZOLE 40 MG: 20 CAPSULE, DELAYED RELEASE ORAL at 06:15

## 2019-04-06 RX ADMIN — BUMETANIDE 3 MG: 2 TABLET ORAL at 07:58

## 2019-04-06 RX ADMIN — SULFAMETHOXAZOLE AND TRIMETHOPRIM 1 TABLET: 400; 80 TABLET ORAL at 18:15

## 2019-04-06 ASSESSMENT — MIFFLIN-ST. JEOR: SCORE: 1318.43

## 2019-04-06 NOTE — PROGRESS NOTES
[facility]   Cardiology II Service / Advanced Heart Failure  Daily Progress Note  Date of Service: 4/6/2019      Patient: Everton Larios  MRN: [id number]  Admission Date: 4/3/2019  Hospital Day # 3      Tacro level from today 8.1, representing ~11.5hr trough.  Goal tacro level 8-10, so continue tacro 3mg twice daily and repeat level on Monday.      Elin Jensen, GHADA, APRN, FNP-BC  Advanced Heart Failure Nurse Practitioner  [facility]

## 2019-04-06 NOTE — PLAN OF CARE
"OT: Attempted to see pt this afternoon but he adamantly refused; stating, \"there will be no more therapy.\" -45 minutes.   "

## 2019-04-06 NOTE — PROGRESS NOTES
Transitional Care Unit  Progress Note  Anai Collins PA-C  4/6/19          Assessment & Plan:   Everton Larios is a 54 year old male admitted to ARU on 4/3/2019. He has a pmh of hypothyroidism, mild intermittent asthma, UC, depression, ETOH abuse, hypothyroidism, GERD, BPH, depression, paroxysmal Afib s/p ablation and cardioversion,  NICM c/b cardiogenic shock s/p IABP w/ subsequent OHT 2/24/19. Post op course c/b RV dysfunction, FRANKLIN on CKD requiring CRRT 3/8/19 transitioned to HD 3/14 (off 3/29), and HCAP. Discharged to ARU on 4/3 for ongoing therapies.      # NICM s/p OHT 2/24/19. Initially admitted to [facility] due to heart failure 2/2 non-ischemic cardiomyopathy on 2/17/19. Required assistance of intra-aortic balloon placement for hemodynamic support on 2/19/19. A donor heart became available and on 2/24/19 patient received OHT. Post operative course complicated by delirium (resolved),FRANKLIN (see below) and hematoma at previous AICD which required washout and drain placement on 3/21/19. Most recent tacro 9/9 on 4/4.   - Immunosuppression: cellcept 1500 mg BID, tacro 3 mg BID  - Prednisone 10 mg BID  - Tacro level q M/Th  - Qweekly CMV, next 4/8   - Nystatin for fungal ppx, valcyte every other day, Bactrim bi-weekly  - Daily weights     FRANKLIN on CKD. Post op developed oliguric FRANKLIN thought 2/2 nephrotoxicity associated with tacrolimus and diuretic use.  and was started on CRRT 3/8 and transitioned to HD 3/14. He ultimately had renal recovery with end of HD 3/23. BL creatinine 1.1-1.4. Most recent Cr 1.58 (4/4).  - Avoid nephrotixins  - Continue Bumex BID for volume (decreased 4/4)  - BMP, Mag, Phos qM/Th     Klebsiella oxytoca pneumonia. dx by sputum cx 3/20/19. CXR 3/22/19 with evidence of retrocardiac opacity, CXR 3/1/9 with nodular groundglass opacities in the R lung. Started zosyn 3/21 with clinical improvement, transitioned to PO bactrim 3/23 following sensitivities, completed treatment course 4/5.     UC.  "Continue mesalamine.   Hypothyroidism. Continue PTA levothyroxine.  BPH. Continue tamsulosin.  Depression. Continue duloxetine  GERD. Continue omeprazole  Mild Intermittent Asthma. Continue fluticasone, montelucast             Nadja Collins PA-C  Hospitalist Service  [phone]          Interval History:   Ongoing improvement in pedal swelling. Participating in therapies. Denies chest pain, shortness of breath or difficulty breathing. Good appetite. Sleeping well. Denies further concerns.           Physical Exam:   Blood pressure 119/80, pulse 87, temperature 98.5  F (36.9  C), temperature source Oral, resp. rate 19, height 1.727 m (5' 8\"), weight 51.4 kg (113 lb 4.8 oz), SpO2 99 %.  GENERAL: Awake. Appears to be resting comfortably. NAD.   HEENT: NC/AT. Anicteric sclera. Mucous membranes moist.  NECK: Supple   CV: RRR, S1S2. No appreciable murmurs, rubs, or gallops.   RESPIRATORY: Normal respiratory effort on RA. Lungs CTAB without wheezes, rales or rhonchi.   GI: Soft, non-tender, and non-distended with bowel sounds present in all quadrants.  EXTREMITIES: 1+ bilateral pedal swelling. Warm & well perfused.  NEUROLOGIC: Alert and orientated x 3. CN II-XII grossly intact. No focal deficits.   MUSCULOSKELETAL: No joint swelling or tenderness.   SKIN: No jaundice. No rashes or lesions.       "

## 2019-04-06 NOTE — PLAN OF CARE
"Patient had a fall at about 1215. According to the CARMEN patient had his call light on and when the CARMEN arrive to respond to the light , the patient was up amb with his walker. The patient declined assisst and use the F word telling the CARMEN to get out of his room. The CARMEN went t to report to the charge nurse, and then to the writer, within 2 minutes another CARMEN responds to calling for help from patients room, upon arrival patient was noted  sitting on the floor in front of his bed and stated that he fell. Patient repeatedly states that staff are not reponding to his call light in time.At this time patient was yelling very loud and using several foul language directed at staff, He calm down a little bit and was able to be assisted from the floor by 2 staff with transfer belt in to his chair, he declined chair alarm. Patient denies hitting his head and at the time denies any pain. Patient declined vital signs check and physical assessment, however he later calm down a bit and took his noon and 1400 hrs meds, \" I will take my meds and I will not do anything else, I want to get out of here\" Patient was provided with patients relations number and the ANS was notified, Patients mom updated  "

## 2019-04-06 NOTE — SIGNIFICANT EVENT
Significant Event Note    Call because patient fell on his right hip and left chest when trying to walk from bed to the bathroom. Did not hit his head. No loss of consciousness. Not on anticoagulation.    Pt reports acute onset, post-fall, persistent, throbbing pain in left upper chest wall as well as new right hip pain with ambulation.     Physical Exam  Gen: resting comfortably, but upset about waiting >20 minutes for assistance to the bathroom  Lung: clear  Chest: midline sternotomy scar, no ecchymosis, swelling, or notable joint deformity, but (+) tenderness with palpation to left upper chest wall  Right Hip: no ecchymosis, no tenderness to palpation  Neuro: A&Ox3, CN II-XII grossly intact    EKG: rate 90, normal sinus rhythm, old incomplete RBBB. No ST changes    Assessment:  Suspect pains are due to mild musculoskeletal injury, but will order the following studies to rule-out traumatic complications.     Plan:  - left rib portable XRay to rule out subacute fracture  - right hip portable XRay to rule traumatic injury  - PO tylenol 975mg now    Discussed with the patient    Saud Nesbitt MD

## 2019-04-06 NOTE — PROGRESS NOTES
[facility], [place]   Physical Medicine and Rehabilitation Daily Note           Assessment and Plan of Care:   Mr. Everton Larios is 54 year old year old man with a past medical history of hypothyroidism, GERD, BPH, depression, paroxysmal A-fib, NICM s/p OHT 2/24/2019.  Post operative course complicated by FRANKLIN, RV Dysfunction, HCAP and impaired activity tolerance and strength. Admitted to ARU 4/3/2019 for ongoing rehabilitation.      --Vitals stable. No lab today.  --Continue ongoing medical management. Urinary symptoms unchanged. No dysuria or hematuria.   --Continue therapies and plan of care.     Around 12:20 staff emergency was called and when I arrived in his room, he was sitting on the floor. Very upset, angry, and used many offensive words and phrases towards staff. Noted that he waited for 20 minutes to get to the bathroom; eventually went himself and on his way back he fell on the floor. No head trauma, no immediate pain. No skin abrasion or bruise noted on exposed skin. Didn't allow any exam. He was helped with 2 staff to sit on his chair. Said that he wants to file a complaint. Explained that we can get patient relation involved. Asked HUC to call and possibly leave a message. Will f/u on Monday. Asked him to call in any new symptoms. Nursing present.            Interval history:   Slept very well last night; 10 hours. Denies fever, chills, CP, SOB, N/V, abdominal pain, new pain or weakness/numbness/tingling.             Physical Exam:     Vitals:    04/05/19 1511 04/05/19 1542 04/06/19 0616 04/06/19 0754   BP:  120/73  119/80   BP Location:  Left arm  Left arm   Pulse:  90  87   Resp:  16  19   Temp:  97.3  F (36.3  C)  98.5  F (36.9  C)   TempSrc:  Oral  Oral   SpO2:  100%  99%   Weight: 51.9 kg (114 lb 8 oz)  51.4 kg (113 lb 4.8 oz)    Height:         Gen: NAD, sitting in chair   Lungs: non-labored in room air   Abd: soft and non-tender  Ext: wwp, no edema in BLE, no  tenderness in calves  MSK/neuro: alert and oriented. speech fluent. Was seen ambulating with FWW.          Data:   Scheduled meds    aspirin  81 mg Oral Daily     bumetanide  3 mg Oral BID     calcium carbonate 600 mg-vitamin D 400 units  1 tablet Oral BID w/meals     DULoxetine  20 mg Oral Daily     fluticasone  1 puff Inhalation Daily     levothyroxine  100 mcg Oral Daily     magnesium oxide  400 mg Oral BID     melatonin  3-9 mg Oral At Bedtime     mesalamine  2,400 mg Oral BID     montelukast  10 mg Oral At Bedtime     multivitamin w/minerals  1 tablet Oral Daily     mycophenolate  1,500 mg Oral BID IS     nystatin  500,000 Units Oral 4x Daily     omeprazole  40 mg Oral BID AC     potassium chloride ER  40 mEq Oral TID     predniSONE  10 mg Oral QPM     predniSONE  10 mg Oral Daily     rosuvastatin  10 mg Oral Daily     sulfamethoxazole-trimethoprim  1 tablet Oral Once per day on Tue Sat     tacrolimus  3 mg Oral BID IS     valGANciclovir  450 mg Oral Every Other Day       PRN meds:  acetaminophen, albuterol, alum & mag hydroxide-simethicone, azelastine, calcium carbonate, naloxone, oxyCODONE, polyethylene glycol, senna-docusate      Joslyn Raymundo MD  Physical Medicine and Rehabilitation

## 2019-04-06 NOTE — PLAN OF CARE
"Patient refusing therapy in PM session stating \"I will not let the staff here work with me any long\". Patient is -55 minutes today due to being 10 minutes behind in AM session due to needing to use restroom/RN in room to start session.  "

## 2019-04-06 NOTE — PLAN OF CARE
Pt calm this shift. Alert. Stable. Voiding well. Ambulated to bathroom. Denied pain. Was in a good mood overall. Incision area to chest is healing. Old chest tube sites, covered, CDI. Still on 2000 mL fluid restriction. Able to make his needs known. Will continue with plan of care.

## 2019-04-07 PROCEDURE — 25000131 ZZH RX MED GY IP 250 OP 636 PS 637: Performed by: PHYSICIAN ASSISTANT

## 2019-04-07 PROCEDURE — 97110 THERAPEUTIC EXERCISES: CPT | Mod: GP | Performed by: PHYSICAL THERAPIST

## 2019-04-07 PROCEDURE — 97110 THERAPEUTIC EXERCISES: CPT | Mod: GO

## 2019-04-07 PROCEDURE — 25000132 ZZH RX MED GY IP 250 OP 250 PS 637: Performed by: PHYSICIAN ASSISTANT

## 2019-04-07 PROCEDURE — 99232 SBSQ HOSP IP/OBS MODERATE 35: CPT | Performed by: PHYSICIAN ASSISTANT

## 2019-04-07 PROCEDURE — 99207 ZZC CDG-MDM COMPONENT: MEETS LOW - DOWN CODED: CPT | Performed by: PHYSICIAN ASSISTANT

## 2019-04-07 PROCEDURE — 25000132 ZZH RX MED GY IP 250 OP 250 PS 637: Performed by: NURSE PRACTITIONER

## 2019-04-07 PROCEDURE — 97535 SELF CARE MNGMENT TRAINING: CPT | Mod: GO

## 2019-04-07 PROCEDURE — 12800006 ZZH R&B REHAB

## 2019-04-07 RX ADMIN — TACROLIMUS 3 MG: 1 CAPSULE ORAL at 08:03

## 2019-04-07 RX ADMIN — OMEPRAZOLE 40 MG: 20 CAPSULE, DELAYED RELEASE ORAL at 07:25

## 2019-04-07 RX ADMIN — ROSUVASTATIN CALCIUM 10 MG: 10 TABLET, FILM COATED ORAL at 08:04

## 2019-04-07 RX ADMIN — Medication 500000 UNITS: at 16:34

## 2019-04-07 RX ADMIN — POTASSIUM CHLORIDE 40 MEQ: 750 TABLET, EXTENDED RELEASE ORAL at 08:04

## 2019-04-07 RX ADMIN — FLUTICASONE FUROATE 1 PUFF: 200 POWDER RESPIRATORY (INHALATION) at 08:02

## 2019-04-07 RX ADMIN — Medication 500000 UNITS: at 08:03

## 2019-04-07 RX ADMIN — MESALAMINE 2400 MG: 800 TABLET, DELAYED RELEASE ORAL at 20:29

## 2019-04-07 RX ADMIN — OMEPRAZOLE 40 MG: 20 CAPSULE, DELAYED RELEASE ORAL at 16:34

## 2019-04-07 RX ADMIN — BUMETANIDE 3 MG: 2 TABLET ORAL at 08:04

## 2019-04-07 RX ADMIN — BUMETANIDE 3 MG: 2 TABLET ORAL at 13:47

## 2019-04-07 RX ADMIN — Medication 500000 UNITS: at 13:52

## 2019-04-07 RX ADMIN — POTASSIUM CHLORIDE 40 MEQ: 750 TABLET, EXTENDED RELEASE ORAL at 20:30

## 2019-04-07 RX ADMIN — POTASSIUM CHLORIDE 40 MEQ: 750 TABLET, EXTENDED RELEASE ORAL at 13:47

## 2019-04-07 RX ADMIN — Medication 500000 UNITS: at 20:34

## 2019-04-07 RX ADMIN — CALCIUM CARBONATE 600 MG (1,500 MG)-VITAMIN D3 400 UNIT TABLET 1 TABLET: at 08:04

## 2019-04-07 RX ADMIN — MYCOPHENOLATE MOFETIL 1500 MG: 500 TABLET ORAL at 08:03

## 2019-04-07 RX ADMIN — MAGNESIUM OXIDE TAB 400 MG (241.3 MG ELEMENTAL MG) 400 MG: 400 (241.3 MG) TAB at 08:04

## 2019-04-07 RX ADMIN — LEVOTHYROXINE SODIUM 100 MCG: 100 TABLET ORAL at 08:04

## 2019-04-07 RX ADMIN — MESALAMINE 2400 MG: 800 TABLET, DELAYED RELEASE ORAL at 08:03

## 2019-04-07 RX ADMIN — MULTIPLE VITAMINS W/ MINERALS TAB 1 TABLET: TAB at 08:04

## 2019-04-07 RX ADMIN — MYCOPHENOLATE MOFETIL 1500 MG: 500 TABLET ORAL at 17:22

## 2019-04-07 RX ADMIN — MONTELUKAST SODIUM 10 MG: 10 TABLET, COATED ORAL at 20:30

## 2019-04-07 RX ADMIN — MELATONIN TAB 3 MG 6 MG: 3 TAB at 20:30

## 2019-04-07 RX ADMIN — DULOXETINE HYDROCHLORIDE 20 MG: 20 CAPSULE, DELAYED RELEASE ORAL at 08:05

## 2019-04-07 RX ADMIN — ASPIRIN 81 MG CHEWABLE TABLET 81 MG: 81 TABLET CHEWABLE at 08:03

## 2019-04-07 RX ADMIN — TACROLIMUS 3 MG: 1 CAPSULE ORAL at 17:23

## 2019-04-07 RX ADMIN — PREDNISONE 10 MG: 10 TABLET ORAL at 17:23

## 2019-04-07 RX ADMIN — ACETAMINOPHEN 650 MG: 325 TABLET, FILM COATED ORAL at 13:47

## 2019-04-07 RX ADMIN — ACETAMINOPHEN 650 MG: 325 TABLET, FILM COATED ORAL at 01:45

## 2019-04-07 RX ADMIN — MAGNESIUM OXIDE TAB 400 MG (241.3 MG ELEMENTAL MG) 400 MG: 400 (241.3 MG) TAB at 20:30

## 2019-04-07 RX ADMIN — PREDNISONE 10 MG: 10 TABLET ORAL at 08:05

## 2019-04-07 RX ADMIN — CALCIUM CARBONATE 600 MG (1,500 MG)-VITAMIN D3 400 UNIT TABLET 1 TABLET: at 17:23

## 2019-04-07 NOTE — PLAN OF CARE
OT: patient angry and verbally assertive, refused participation in am OT tX. Charge RN and therapy schedlling aware

## 2019-04-07 NOTE — PLAN OF CARE
FOCUS/GOAL  Bladder management, Pain management, Medical management and Mobility    ASSESSMENT, INTERVENTIONS AND CONTINUING PLAN FOR GOAL:  Pt is alert and oriented x4. Dr. Raymundo notified nurse that pt told her he was having chest pain when she went to check up on him during evening shift. Pt refused to allow Dr. Raymundo to assess her, hospitalist was called to assess pt. Writer went in room as pt describing pain/symptoms to hospitalist. Visual assessment by nurse and ascultation normal. Chest xray, and hip xray ordered as these were the sites of pain. EKG ordered and came back unchanged from previous. Hospitalist reviewed xray and was unconcerned. Recheck of pain pt stated it had resolved. Pt calling appropriately for assist to bathroom. Pt cooperating with fluid restrictions. No concerns at this time.

## 2019-04-07 NOTE — PLAN OF CARE
-75 minutes on the day for PT today due to patient refusal, however patient agreed to complete 30 minutes session in afternoon with PT. During afternoon session, patient demonstrates improvement in LE strength and endurance evident by ability to complete nu-step intervals for 4 minutes today and completes sit-stands at SBA today.

## 2019-04-07 NOTE — PROGRESS NOTES
Transitional Care Unit  Progress Note  Anai Collins PA-C  4/7/19          Assessment & Plan:   Everton Larios is a 54 year old male admitted to ARU on 4/3/2019. He has a pmh of hypothyroidism, mild intermittent asthma, UC, depression, ETOH abuse, hypothyroidism, GERD, BPH, depression, paroxysmal Afib s/p ablation and cardioversion,  NICM c/b cardiogenic shock s/p IABP w/ subsequent OHT 2/24/19. Post op course c/b RV dysfunction, FRANKLIN on CKD requiring CRRT 3/8/19 transitioned to HD 3/14 (off 3/29), and HCAP. Discharged to ARU on 4/3 for ongoing therapies.      # NICM s/p OHT 2/24/19. Initially admitted to [facility] due to heart failure 2/2 non-ischemic cardiomyopathy on 2/17/19. Required assistance of intra-aortic balloon placement for hemodynamic support on 2/19/19. A donor heart became available and on 2/24/19 patient received OHT. Post operative course complicated by delirium (resolved),FRANKLIN (see below) and hematoma at previous AICD which required washout and drain placement on 3/21/19. Most recent tacro 9/9 on 4/4.   - Immunosuppression: cellcept 1500 mg BID, tacro 3 mg BID  - Prednisone 10 mg BID  - Tacro level q M/Th  - Qweekly CMV, next 4/8   - Nystatin for fungal ppx, valcyte every other day, Bactrim bi-weekly  - Daily weights     FRANKLIN on CKD. Post op developed oliguric FRANKLIN thought 2/2 nephrotoxicity associated with tacrolimus and diuretic use.  and was started on CRRT 3/8 and transitioned to HD 3/14. He ultimately had renal recovery with end of HD 3/23. BL creatinine 1.1-1.4. Most recent Cr 1.58 (4/4).  - Avoid nephrotixins  - Continue Bumex BID for volume (decreased 4/4)  - BMP, Mag, Phos qM/Th     Klebsiella oxytoca pneumonia. dx by sputum cx 3/20/19. CXR 3/22/19 with evidence of retrocardiac opacity, CXR 3/1/9 with nodular groundglass opacities in the R lung. Started zosyn 3/21 with clinical improvement, transitioned to PO bactrim 3/23 following sensitivities, completed treatment course 4/5.     UC.  "Continue mesalamine.   Hypothyroidism. Continue PTA levothyroxine.  BPH. Continue tamsulosin.  Depression. Continue duloxetine  GERD. Continue omeprazole  Mild Intermittent Asthma. Continue fluticasone, montelukast    Meds pt will need filled if he discharges:  -Bumex 3 mg BID  -Magnesium Oxide 400 mg BID  -Mycophenolate 1500 mg BID  -Nystatin Susp QID  -K-Dur 40 meq TID  -Prednisone 10 mg BID  -Crestor 10 mg daily  -Bactrim 1 tab 2x/wkl (Tue/Sat)  -Tacrolimus 3 mg BID  -Valacyte 450 mg every other day          Nadja Collins PA-C  Hospitalist Service  [phone]          Interval History:   Pt had fall yesterday after pt declined assistance when up ambulating with walked. Fall was no witnessed. Pt denies hitting his head. XR of chest/ pelvis negative for fx. Pt very upset, yelling at staff, and demanding to leave. Slightly calmer this am upon interview but continues to demand to leave. States that hip and pelvis are sore, but not painful. Denies HA, visual changes or nausea. No chest pain, shortness of breath or difficulty breathing. Good appetite. Reviewed medications and discussed which meds he would need to be filled if he were to discharge. States that he would like to leave tomorrow         Physical Exam:   Blood pressure 126/85, pulse 93, temperature 97.6  F (36.4  C), temperature source Oral, resp. rate 16, height 1.727 m (5' 8\"), weight 51.4 kg (113 lb 4.8 oz), SpO2 99 %.  GENERAL: Awake. Appears to be resting comfortably. NAD.   HEENT: NC/AT. Anicteric sclera. Mucous membranes moist.  NECK: Supple   CV: RRR, S1S2. No appreciable murmurs, rubs, or gallops.   RESPIRATORY: Normal respiratory effort on RA. Lungs CTAB without wheezes, rales or rhonchi.   GI: Soft, non-tender, and non-distended with bowel sounds present in all quadrants.  EXTREMITIES: 1+ bilateral pedal swelling. Warm & well perfused.  NEUROLOGIC: Alert and orientated x 3. CN II-XII grossly intact. No focal deficits.   MUSCULOSKELETAL: No " joint swelling or tenderness.   SKIN: No jaundice. No rashes or lesions.

## 2019-04-07 NOTE — PLAN OF CARE
"Patient alert and oriented to self place and time, not sure if he is oriented to situation due to repeatedly declining therapies, however so far cooperating with nursing, using his call light and has expressed interest in keeping chair alarm for safety. However about 10 minutes ago writer respond to call light and upon arrival was shouting at mom and dad saying\" you take every body's side except me\" The dad yelled \"shut up\" the mom was pacing back and forth in the room appearing upset. The patient patient directed the F word at his parents whilst asking them out of the room. The parents told him before they left the room that they will not take him in to their house when he discharged. I later visited with patient and he told me that he is on the process of finding another  place since his parents won't allow him in to their house, PMR updated, will contnue POC.  Patient at about 1130, stated that he will participate in therapy today after writer rte-iterated thew importance of following through with the treatment plan especially his PT/O. C/o chest pain, decline sob and described the pain as more muscular in nature, tylenol administered. Patient complied with fluid restriction and drink 1200cc of fluid so far, will continue POC  "

## 2019-04-07 NOTE — PLAN OF CARE
Pt calls for assistance appropriately, continent of bowel and stands bedside to void in urinal.  Pt cantankerous, but allows some quick assessment.  Pt refuses his weight until he has breakfast.  Pt given tylenol x1 for pain.

## 2019-04-08 ENCOUNTER — PRE VISIT (OUTPATIENT)
Dept: TRANSPLANT | Facility: CLINIC | Age: 54
End: 2019-04-08

## 2019-04-08 DIAGNOSIS — Z94.1 HEART REPLACED BY TRANSPLANT (H): Primary | ICD-10-CM

## 2019-04-08 LAB
ANION GAP SERPL CALCULATED.3IONS-SCNC: 9 MMOL/L (ref 3–14)
BUN SERPL-MCNC: 46 MG/DL (ref 7–30)
CALCIUM SERPL-MCNC: 8.1 MG/DL (ref 8.5–10.1)
CHLORIDE SERPL-SCNC: 108 MMOL/L (ref 94–109)
CMV DNA SPEC NAA+PROBE-ACNC: NORMAL [IU]/ML
CMV DNA SPEC NAA+PROBE-LOG#: NORMAL {LOG_IU}/ML
CO2 SERPL-SCNC: 24 MMOL/L (ref 20–32)
CREAT SERPL-MCNC: 1.15 MG/DL (ref 0.66–1.25)
ERYTHROCYTE [DISTWIDTH] IN BLOOD BY AUTOMATED COUNT: 17.4 % (ref 10–15)
GFR SERPL CREATININE-BSD FRML MDRD: 71 ML/MIN/{1.73_M2}
GLUCOSE SERPL-MCNC: 101 MG/DL (ref 70–99)
HCT VFR BLD AUTO: 29 % (ref 40–53)
HGB BLD-MCNC: 8.9 G/DL (ref 13.3–17.7)
INTERPRETATION ECG - MUSE: NORMAL
MAGNESIUM SERPL-MCNC: 1.4 MG/DL (ref 1.6–2.3)
MAGNESIUM SERPL-MCNC: 2.5 MG/DL (ref 1.6–2.3)
MCH RBC QN AUTO: 29.3 PG (ref 26.5–33)
MCHC RBC AUTO-ENTMCNC: 30.7 G/DL (ref 31.5–36.5)
MCV RBC AUTO: 95 FL (ref 78–100)
PHOSPHATE SERPL-MCNC: 2.8 MG/DL (ref 2.5–4.5)
PLATELET # BLD AUTO: 183 10E9/L (ref 150–450)
POTASSIUM SERPL-SCNC: 4.2 MMOL/L (ref 3.4–5.3)
RBC # BLD AUTO: 3.04 10E12/L (ref 4.4–5.9)
SODIUM SERPL-SCNC: 141 MMOL/L (ref 133–144)
SPECIMEN SOURCE: NORMAL
TACROLIMUS BLD-MCNC: 7.5 UG/L (ref 5–15)
TME LAST DOSE: NORMAL H
WBC # BLD AUTO: 9.4 10E9/L (ref 4–11)

## 2019-04-08 PROCEDURE — 97535 SELF CARE MNGMENT TRAINING: CPT | Mod: GO | Performed by: OCCUPATIONAL THERAPIST

## 2019-04-08 PROCEDURE — 36415 COLL VENOUS BLD VENIPUNCTURE: CPT | Performed by: PHYSICIAN ASSISTANT

## 2019-04-08 PROCEDURE — 25000132 ZZH RX MED GY IP 250 OP 250 PS 637: Performed by: PHYSICIAN ASSISTANT

## 2019-04-08 PROCEDURE — 25000132 ZZH RX MED GY IP 250 OP 250 PS 637: Performed by: NURSE PRACTITIONER

## 2019-04-08 PROCEDURE — 85027 COMPLETE CBC AUTOMATED: CPT | Performed by: PHYSICIAN ASSISTANT

## 2019-04-08 PROCEDURE — 83735 ASSAY OF MAGNESIUM: CPT | Performed by: PHYSICIAN ASSISTANT

## 2019-04-08 PROCEDURE — 25000128 H RX IP 250 OP 636: Performed by: PHYSICIAN ASSISTANT

## 2019-04-08 PROCEDURE — 25000131 ZZH RX MED GY IP 250 OP 636 PS 637: Performed by: PHYSICIAN ASSISTANT

## 2019-04-08 PROCEDURE — 80197 ASSAY OF TACROLIMUS: CPT | Performed by: PHYSICIAN ASSISTANT

## 2019-04-08 PROCEDURE — 84100 ASSAY OF PHOSPHORUS: CPT | Performed by: PHYSICIAN ASSISTANT

## 2019-04-08 PROCEDURE — 99232 SBSQ HOSP IP/OBS MODERATE 35: CPT | Performed by: PHYSICIAN ASSISTANT

## 2019-04-08 PROCEDURE — 97116 GAIT TRAINING THERAPY: CPT | Mod: GP

## 2019-04-08 PROCEDURE — 99207 ZZC CDG-MDM COMPONENT: MEETS LOW - DOWN CODED: CPT | Performed by: PHYSICIAN ASSISTANT

## 2019-04-08 PROCEDURE — 80048 BASIC METABOLIC PNL TOTAL CA: CPT | Performed by: PHYSICIAN ASSISTANT

## 2019-04-08 PROCEDURE — 97110 THERAPEUTIC EXERCISES: CPT | Mod: GP | Performed by: STUDENT IN AN ORGANIZED HEALTH CARE EDUCATION/TRAINING PROGRAM

## 2019-04-08 PROCEDURE — 97530 THERAPEUTIC ACTIVITIES: CPT | Mod: GP

## 2019-04-08 PROCEDURE — 12800006 ZZH R&B REHAB

## 2019-04-08 PROCEDURE — 97116 GAIT TRAINING THERAPY: CPT | Mod: GP | Performed by: STUDENT IN AN ORGANIZED HEALTH CARE EDUCATION/TRAINING PROGRAM

## 2019-04-08 PROCEDURE — 97530 THERAPEUTIC ACTIVITIES: CPT | Mod: GO | Performed by: OCCUPATIONAL THERAPIST

## 2019-04-08 PROCEDURE — 97530 THERAPEUTIC ACTIVITIES: CPT | Mod: GP | Performed by: STUDENT IN AN ORGANIZED HEALTH CARE EDUCATION/TRAINING PROGRAM

## 2019-04-08 RX ORDER — LIDOCAINE 40 MG/G
CREAM TOPICAL
Status: CANCELLED | OUTPATIENT
Start: 2019-04-08

## 2019-04-08 RX ORDER — SULFAMETHOXAZOLE/TRIMETHOPRIM 800-160 MG
1 TABLET ORAL
Status: DISCONTINUED | OUTPATIENT
Start: 2019-04-11 | End: 2019-04-12 | Stop reason: HOSPADM

## 2019-04-08 RX ORDER — VALGANCICLOVIR 450 MG/1
900 TABLET, FILM COATED ORAL DAILY
Status: DISCONTINUED | OUTPATIENT
Start: 2019-04-09 | End: 2019-04-12 | Stop reason: HOSPADM

## 2019-04-08 RX ORDER — TACROLIMUS 1 MG/1
3 CAPSULE ORAL EVERY MORNING
Status: DISCONTINUED | OUTPATIENT
Start: 2019-04-09 | End: 2019-04-12 | Stop reason: HOSPADM

## 2019-04-08 RX ORDER — POTASSIUM CHLORIDE 750 MG/1
40 TABLET, EXTENDED RELEASE ORAL 2 TIMES DAILY
Status: DISCONTINUED | OUTPATIENT
Start: 2019-04-08 | End: 2019-04-12 | Stop reason: HOSPADM

## 2019-04-08 RX ORDER — MAGNESIUM SULFATE HEPTAHYDRATE 40 MG/ML
4 INJECTION, SOLUTION INTRAVENOUS EVERY 4 HOURS PRN
Status: DISCONTINUED | OUTPATIENT
Start: 2019-04-08 | End: 2019-04-12 | Stop reason: HOSPADM

## 2019-04-08 RX ORDER — BUMETANIDE 2 MG/1
2 TABLET ORAL
Status: DISCONTINUED | OUTPATIENT
Start: 2019-04-08 | End: 2019-04-12 | Stop reason: HOSPADM

## 2019-04-08 RX ADMIN — TACROLIMUS 3.5 MG: 1 CAPSULE ORAL at 17:00

## 2019-04-08 RX ADMIN — VALGANCICLOVIR 450 MG: 450 TABLET, FILM COATED ORAL at 09:01

## 2019-04-08 RX ADMIN — MAGNESIUM OXIDE TAB 400 MG (241.3 MG ELEMENTAL MG) 400 MG: 400 (241.3 MG) TAB at 21:49

## 2019-04-08 RX ADMIN — DULOXETINE HYDROCHLORIDE 20 MG: 20 CAPSULE, DELAYED RELEASE ORAL at 09:01

## 2019-04-08 RX ADMIN — MAGNESIUM SULFATE IN WATER 4 G: 40 INJECTION, SOLUTION INTRAVENOUS at 17:12

## 2019-04-08 RX ADMIN — LEVOTHYROXINE SODIUM 100 MCG: 100 TABLET ORAL at 09:01

## 2019-04-08 RX ADMIN — MESALAMINE 2400 MG: 800 TABLET, DELAYED RELEASE ORAL at 21:50

## 2019-04-08 RX ADMIN — ROSUVASTATIN CALCIUM 10 MG: 10 TABLET, FILM COATED ORAL at 09:02

## 2019-04-08 RX ADMIN — PREDNISONE 10 MG: 10 TABLET ORAL at 09:02

## 2019-04-08 RX ADMIN — PREDNISONE 10 MG: 10 TABLET ORAL at 17:01

## 2019-04-08 RX ADMIN — BUMETANIDE 2 MG: 2 TABLET ORAL at 14:32

## 2019-04-08 RX ADMIN — BUMETANIDE 3 MG: 2 TABLET ORAL at 09:01

## 2019-04-08 RX ADMIN — POTASSIUM CHLORIDE 40 MEQ: 750 TABLET, EXTENDED RELEASE ORAL at 21:49

## 2019-04-08 RX ADMIN — MYCOPHENOLATE MOFETIL 1500 MG: 500 TABLET ORAL at 09:01

## 2019-04-08 RX ADMIN — MULTIPLE VITAMINS W/ MINERALS TAB 1 TABLET: TAB at 09:01

## 2019-04-08 RX ADMIN — TACROLIMUS 3 MG: 1 CAPSULE ORAL at 09:01

## 2019-04-08 RX ADMIN — MONTELUKAST SODIUM 10 MG: 10 TABLET, COATED ORAL at 21:49

## 2019-04-08 RX ADMIN — MAGNESIUM OXIDE TAB 400 MG (241.3 MG ELEMENTAL MG) 400 MG: 400 (241.3 MG) TAB at 09:01

## 2019-04-08 RX ADMIN — Medication 500000 UNITS: at 17:01

## 2019-04-08 RX ADMIN — CALCIUM CARBONATE 600 MG (1,500 MG)-VITAMIN D3 400 UNIT TABLET 1 TABLET: at 17:00

## 2019-04-08 RX ADMIN — FLUTICASONE FUROATE 1 PUFF: 200 POWDER RESPIRATORY (INHALATION) at 09:02

## 2019-04-08 RX ADMIN — MESALAMINE 2400 MG: 800 TABLET, DELAYED RELEASE ORAL at 09:01

## 2019-04-08 RX ADMIN — MYCOPHENOLATE MOFETIL 1500 MG: 500 TABLET ORAL at 17:01

## 2019-04-08 RX ADMIN — OMEPRAZOLE 40 MG: 20 CAPSULE, DELAYED RELEASE ORAL at 16:56

## 2019-04-08 RX ADMIN — Medication 500000 UNITS: at 21:49

## 2019-04-08 RX ADMIN — Medication 500000 UNITS: at 14:33

## 2019-04-08 RX ADMIN — ASPIRIN 81 MG CHEWABLE TABLET 81 MG: 81 TABLET CHEWABLE at 09:01

## 2019-04-08 RX ADMIN — MELATONIN TAB 3 MG 9 MG: 3 TAB at 21:49

## 2019-04-08 RX ADMIN — POTASSIUM CHLORIDE 40 MEQ: 750 TABLET, EXTENDED RELEASE ORAL at 09:32

## 2019-04-08 RX ADMIN — CALCIUM CARBONATE 600 MG (1,500 MG)-VITAMIN D3 400 UNIT TABLET 1 TABLET: at 09:01

## 2019-04-08 RX ADMIN — Medication 500000 UNITS: at 09:02

## 2019-04-08 RX ADMIN — OMEPRAZOLE 40 MG: 20 CAPSULE, DELAYED RELEASE ORAL at 06:01

## 2019-04-08 ASSESSMENT — MIFFLIN-ST. JEOR: SCORE: 1310.71

## 2019-04-08 NOTE — CONSULTS
Everton and his parents were seen for transplant education. Everton did not know where his transplant handbook or lab book were at or if he had one. We talked about the importance of the information included in the book. He had some idea about his medications but had not been setting up his meds nor was he able to identify which medications he was taking for rejection or infection. We talked about his medications and lab work and I again reinforced him reading through the information and getting familiar with his lab routine and medications.

## 2019-04-08 NOTE — PLAN OF CARE
FOCUS/GOAL  Medical management    ASSESSMENT, INTERVENTIONS AND CONTINUING PLAN FOR GOAL:  Pt slept well overnight. Up to the bathroom x1, used the urinal bedside x2 overnight. Continent of bladder and bowel, medium BM overnight. Ambulates with standby assist and walker. Denied pain and SOB. Pleasant and cooperative with cares overnight. Using call light appropriately. Bed alarm on for safety, call light within reach.

## 2019-04-08 NOTE — PROGRESS NOTES
[facility], [facility] Progress Note - Hospitalist Service       Date of Admission:  4/3/2019    Assessment & Plan   Everton Larios is a 54 year old male with history of NICM, PAF, CKD, UC, Pierce's esophagus, asthma, PACO, hypothyroidism, and depression admitted to [facility] with cardiogenic shock s/p OHT (2/24). Hospital course c/b ICU delirium, RV dysfunction, FRANKLIN requiring HD (last 3/23), HCAP, severe malnutrition, and hematoma of prior AICD site s/p washout (3/21). Transferred to ARU for ongoing rehabilitation.     1. NICM s/p OHT (2/24):  Echo 3/26 showed evidence of LVH, EF 60-65%, dilated and hypertrophic RV with RV systolic dysfunction noted. RHC 3/28 showed elevated filling pressures, mild pHTN, and normal cardiac output. Biopsy showed evidence of ischemic/reperfusion injury, but no rejection. Clinically stable. Weight down 7 lbs since admission to ARU. Appears euvolemic on exam. No hypoxia. Sternotomy healing well. Tacro level 7.5 today (goal 8-10), dose adjusted per Cards II recs.   - Tacrolimus 3/3.5mg BID  - Mycophenolate 1500mg BID  - Prednisone 10mg daily  - Prophylaxis:  Bactrim. Nystatin. Valcyte.   - Bumex decreased to 2mg BID; cont K supplement  - Cont daily weights, I/O's  - Repeat Tacro level 4/10  - RHC with biopsy due 4/10    2. FRANKLIN on CKD:  Baseline Cr 0.8-1.0. Developed FRANKLIN secondary to ischemic ATN in setting of cardiogenic shock, requiring CRRT 3/8 then transitioned to HD 3/14-3/23. Cr continues to improve, currently 1.15.  - BMP q M/Th     3. HCAP:  Sputum culture 3/20 grew Klebsiella oxytoca. CXR 3/22 noted retrocardiac opacity. Completed course of abx 4/4. Clinically stable. Afebrile. Vitals stable. No hypoxia.     4. UC:  No acute concerns. Cont PTA mesalamine.     5. Hypothyroidism:  TSH 1.05 on 3/9/19. Cont PTA levothyroxine.     6. Depression:  No acute concerns. Cont PTA cymbalta.     7. GERD:  Hx Pierce's. Cont PPI therapy.     8. Mild  intermittent asthma:  No wheezing one exam. Cont PTA fluticasone and singulair.       The patient's care was discussed with the Patient and Primary team.    Mars Day PA-C  Hospitalist Service  [facility], [place]    ______________________________________________________________________    Interval History   Feeling well. Still feels like there is fluid in legs.     Data reviewed today: I reviewed all medications, new labs and imaging results over the last 24 hours. I personally reviewed no images or EKG's today.    Physical Exam   Vital Signs: Temp: 96.4  F (35.8  C) Temp src: Oral BP: 126/76 Pulse: 101   Resp: 18 SpO2: 98 % O2 Device: None (Room air)    Weight: 111 lbs 9.6 oz    GENERAL:  Awake. Alert. Oriented x 3. NAD.   HEENT:  Mucous membranes moist.   CV:  RRR. S1, S2. No murmurs appreciated.   RESPIRATORY:  Lungs CTAB with no wheezing, rales, rhonchi.   GI:  Abdomen soft, non-distended. Active bowel sounds. No tenderness.    NEUROLOGICAL:  Grossly non-focal. Moves all extremities.    EXTREMITIES:  No peripheral edema. No calf tenderness. Intact bilateral pedal pulses.   SKIN:  No jaundice, rashes, wounds or lesions.      Data   ROUTINE IP LABS (Last four results)  Recent Labs   Lab 04/08/19  0555 04/04/19  0840 04/02/19  0514    137 137   POTASSIUM 4.2 4.2 4.1   CHLORIDE 108 103 104   CO2 24 22 21   ANIONGAP 9 12 12   * 162* 96   BUN 46* 45* 50*   CR 1.15 1.58* 1.85*   RADAMES 8.1* 7.9* 7.7*   MAG 1.4*  --  1.5*   PHOS 2.8  --  3.4   PROTTOTAL  --  5.8*  --    ALBUMIN  --  2.9*  --    BILITOTAL  --  0.6  --    ALKPHOS  --  139  --    AST  --  16  --    ALT  --  26  --      Recent Labs   Lab 04/08/19  0555 04/04/19  0840 04/02/19  0514   WBC 9.4 8.1 5.9   RBC 3.04* 3.16* 2.60*   HGB 8.9* 9.1* 7.9*   HCT 29.0* 29.6* 25.7*   MCV 95 94 99   MCH 29.3 28.8 30.4   MCHC 30.7* 30.7* 30.7*   RDW 17.4* 18.0* 18.6*    267 253     No lab results found in last 7 days.      Glucose Values Latest Ref Rng & Units 3/30/2019 3/30/2019 3/31/2019 4/1/2019 4/2/2019 4/4/2019 4/8/2019   Bedside Glucose (mg/dl )  - -- -- -- -- -- -- --   GLUCOSE 70 - 99 mg/dL 101(H) 105(H) 126(H) 86 96 162(H) 101(H)   Some recent data might be hidden

## 2019-04-08 NOTE — PLAN OF CARE
Pt with improved participation today. Nearing mod I in room with FWW. Focusing on sit<>stands, optimizing surface height in order to adhere to sternal precautions; stair navigation with B hands on single rail using side step technique; general strengthening and endurance.

## 2019-04-08 NOTE — PROGRESS NOTES
"  [facility]   Acute Rehabilitation Unit  Daily progress note    interval history  Everton Larios was seen sitting up in wheelchair. Feeling pretty good this am, had shower which went well per his report though \"needed more help than I would like\".  Denies n/v/d, breathing is improved.  Urination is still frequent, swelling is down, weight is down and would like to decrease diuretics.  Some aches still from fall over weekend.  Relays story of waiting for assistance and response delayed with decision to ambulate self to bathroom and \"tripping\" on way back to bed.  Discusses argument with parents though plan to discharge to their home later this week.  He has discussed situation with patient relations.       medications    aspirin  81 mg Oral Daily     bumetanide  3 mg Oral BID     calcium carbonate 600 mg-vitamin D 400 units  1 tablet Oral BID w/meals     DULoxetine  20 mg Oral Daily     fluticasone  1 puff Inhalation Daily     levothyroxine  100 mcg Oral Daily     magnesium oxide  400 mg Oral BID     melatonin  3-9 mg Oral At Bedtime     mesalamine  2,400 mg Oral BID     montelukast  10 mg Oral At Bedtime     multivitamin w/minerals  1 tablet Oral Daily     mycophenolate  1,500 mg Oral BID IS     nystatin  500,000 Units Oral 4x Daily     omeprazole  40 mg Oral BID AC     potassium chloride ER  40 mEq Oral TID     predniSONE  10 mg Oral QPM     predniSONE  10 mg Oral Daily     rosuvastatin  10 mg Oral Daily     sulfamethoxazole-trimethoprim  1 tablet Oral Once per day on Tue Sat     tacrolimus  3 mg Oral BID IS     valGANciclovir  450 mg Oral Every Other Day        acetaminophen, albuterol, alum & mag hydroxide-simethicone, azelastine, calcium carbonate, naloxone, oxyCODONE, polyethylene glycol, senna-docusate     physical exam  /88 (BP Location: Right arm)   Pulse 89   Temp 96.4  F (35.8  C) (Oral)   Resp 18   Ht 1.727 m (5' 8\")   Wt 50.6 kg (111 lb 9.6 oz)   " SpO2 98%   BMI 16.97 kg/m    Gen: awake alert nad  HEENT: mmm  Cardio: reg  Pulm: non labored clear on room air  Abd: soft non distended non tender  Ext: warm dry trace pedal edema  Neuro/MSK: alert speech clear moves all extremities.    labs  Lab Results   Component Value Date    WBC 9.4 04/08/2019     Lab Results   Component Value Date    RBC 3.04 04/08/2019     Lab Results   Component Value Date    HGB 8.9 04/08/2019     Lab Results   Component Value Date    HCT 29.0 04/08/2019     Lab Results   Component Value Date    MCV 95 04/08/2019     Lab Results   Component Value Date    MCH 29.3 04/08/2019     Lab Results   Component Value Date    MCHC 30.7 04/08/2019     Lab Results   Component Value Date    RDW 17.4 04/08/2019     Lab Results   Component Value Date     04/08/2019     Last Comprehensive Metabolic Panel:  Sodium   Date Value Ref Range Status   04/08/2019 141 133 - 144 mmol/L Final     Potassium   Date Value Ref Range Status   04/08/2019 4.2 3.4 - 5.3 mmol/L Final     Chloride   Date Value Ref Range Status   04/08/2019 108 94 - 109 mmol/L Final     Carbon Dioxide   Date Value Ref Range Status   04/08/2019 24 20 - 32 mmol/L Final     Anion Gap   Date Value Ref Range Status   04/08/2019 9 3 - 14 mmol/L Final     Glucose   Date Value Ref Range Status   04/08/2019 101 (H) 70 - 99 mg/dL Final     Urea Nitrogen   Date Value Ref Range Status   04/08/2019 46 (H) 7 - 30 mg/dL Final     Creatinine   Date Value Ref Range Status   04/08/2019 1.15 0.66 - 1.25 mg/dL Final     GFR Estimate   Date Value Ref Range Status   04/08/2019 71 >60 mL/min/[1.73_m2] Final     Comment:     Non  GFR Calc  Starting 12/18/2018, serum creatinine based estimated GFR (eGFR) will be   calculated using the Chronic Kidney Disease Epidemiology Collaboration   (CKD-EPI) equation.       Calcium   Date Value Ref Range Status   04/08/2019 8.1 (L) 8.5 - 10.1 mg/dL Final         Rehabilitation - continue comprehensive acute  inpatient rehabilitation program with multidisciplinary approach including therapies, rehab nursing, and physiatry following. See interval history for updates.      assessment and plan    Mr. Everton Larios is 54 year old year old man with a past medical history of hypothyroidism, GERD, BPH, depression, paroxysmal A-fib, NICM s/p OHT 2/24/2019.  Post operative course complicated by FRANKLIN, RV Dysfunction, HCAP and impaired activity tolerance and strength. Admitted to ARU 4/3/2019 for ongoing rehabilitation.    NICM s/p OHT 2/24/2019- with RV dysfunction.    Immunosuppresion: cellcept, tac (goal 8-10) dose increased 4/8, pred,   Prophylaxis- nystatin, bactrim, valcyte   Continue crestor, asa   -appreciate ongoing assistance per hosp/cards  -reduce bumex 2 mg bid- potassium to bid  -daily weights.  -next biopsy 4/10  -continue reg diet, and fluid restriction.   -sternal precautions- : 20 lbs    FRANKLIN on CKD- felt to be ATN, short course of dialysis.  Cr. Continues to improve. 1.15 4/8  -trend bmp    Hypomagnesemia- mag 1.4 4/8 on mag ox 400 mg bid, bumex reduced 4/8  -replace per protocol today  -trend    HCAP- completed course of  Bactrim today 4/5- breathing improved per patient report.     Asthma- breathing stable/improved. continue fluticasone, singulair    Hypothyroidism- continue levothyroxine 100 mcg    Anemia- history of iron deficiency anemia.  Hgb stable 8.9 4/8    UC- continue pta mesalamine    Pierce's esophagus- continues ppi    Depression- mood labile no verbalized depression. continue cymbalata    Right Chest Wall hematoma- s/p drainage in OR 3/21      1. Adjustment to disability:  Mood stable monitor  2. Fen: reg  3. Bowel: continent  4. Bladder: frequent/continent  5. DVT Prophylaxis: mechanical/ amb  6. GI Prophylaxis: ppi  7. Code: full  8. Disposition: home ~ 10 days  9. ELOS: 10 days  10. Follow up Appointments on Discharge: cv surgery, cardiology        discussed with Dr. Raymundo PM&R Staff  Physician, FREDERICK Day PA-C Hospitalist, LUIS Jensen NP Cardiology.    Ann Marie Torres PA-C  Physical Medicine & Rehabilitation   Pager: [phone]

## 2019-04-08 NOTE — PLAN OF CARE
FOCUS/GOAL  Bowel management, Bladder management and Medical management    ASSESSMENT, INTERVENTIONS AND CONTINUING PLAN FOR GOAL:  AOX4, uses call light and able to make needs known. SBA with walker for transfers. Continent of B&B and uses urinal independently. PIV placed, PRN IV mag available, please infuse. Good oral intakes. Patient and parents attended PLC transplant class today. Denies pain or SOB, pleasant and cooperative with cares. Continue with POC.

## 2019-04-09 ENCOUNTER — TELEPHONE (OUTPATIENT)
Dept: CARDIOLOGY | Facility: CLINIC | Age: 54
End: 2019-04-09

## 2019-04-09 PROCEDURE — 12800006 ZZH R&B REHAB

## 2019-04-09 PROCEDURE — 25000132 ZZH RX MED GY IP 250 OP 250 PS 637: Performed by: PHYSICAL MEDICINE & REHABILITATION

## 2019-04-09 PROCEDURE — 97116 GAIT TRAINING THERAPY: CPT | Mod: GP | Performed by: REHABILITATION PRACTITIONER

## 2019-04-09 PROCEDURE — 97530 THERAPEUTIC ACTIVITIES: CPT | Mod: GP | Performed by: REHABILITATION PRACTITIONER

## 2019-04-09 PROCEDURE — 25000132 ZZH RX MED GY IP 250 OP 250 PS 637: Performed by: PHYSICIAN ASSISTANT

## 2019-04-09 PROCEDURE — 97110 THERAPEUTIC EXERCISES: CPT | Mod: GP | Performed by: REHABILITATION PRACTITIONER

## 2019-04-09 PROCEDURE — 97530 THERAPEUTIC ACTIVITIES: CPT | Mod: GO | Performed by: OCCUPATIONAL THERAPIST

## 2019-04-09 PROCEDURE — 25000131 ZZH RX MED GY IP 250 OP 636 PS 637: Performed by: PHYSICIAN ASSISTANT

## 2019-04-09 PROCEDURE — 97110 THERAPEUTIC EXERCISES: CPT | Mod: GO | Performed by: OCCUPATIONAL THERAPIST

## 2019-04-09 RX ADMIN — MELATONIN TAB 3 MG 9 MG: 3 TAB at 20:29

## 2019-04-09 RX ADMIN — MYCOPHENOLATE MOFETIL 1500 MG: 500 TABLET ORAL at 07:54

## 2019-04-09 RX ADMIN — BUMETANIDE 2 MG: 2 TABLET ORAL at 14:00

## 2019-04-09 RX ADMIN — OMEPRAZOLE 40 MG: 20 CAPSULE, DELAYED RELEASE ORAL at 06:16

## 2019-04-09 RX ADMIN — MAGNESIUM OXIDE TAB 400 MG (241.3 MG ELEMENTAL MG) 400 MG: 400 (241.3 MG) TAB at 20:29

## 2019-04-09 RX ADMIN — ROSUVASTATIN CALCIUM 10 MG: 10 TABLET, FILM COATED ORAL at 07:54

## 2019-04-09 RX ADMIN — Medication 500000 UNITS: at 07:53

## 2019-04-09 RX ADMIN — BUMETANIDE 2 MG: 2 TABLET ORAL at 07:54

## 2019-04-09 RX ADMIN — PREDNISONE 10 MG: 10 TABLET ORAL at 07:55

## 2019-04-09 RX ADMIN — VALGANCICLOVIR 900 MG: 450 TABLET, FILM COATED ORAL at 07:55

## 2019-04-09 RX ADMIN — MAGNESIUM OXIDE TAB 400 MG (241.3 MG ELEMENTAL MG) 400 MG: 400 (241.3 MG) TAB at 08:52

## 2019-04-09 RX ADMIN — DULOXETINE HYDROCHLORIDE 20 MG: 20 CAPSULE, DELAYED RELEASE ORAL at 07:54

## 2019-04-09 RX ADMIN — Medication 500000 UNITS: at 20:29

## 2019-04-09 RX ADMIN — FLUTICASONE FUROATE 1 PUFF: 200 POWDER RESPIRATORY (INHALATION) at 07:53

## 2019-04-09 RX ADMIN — PREDNISONE 10 MG: 10 TABLET ORAL at 17:18

## 2019-04-09 RX ADMIN — TACROLIMUS 3 MG: 1 CAPSULE ORAL at 07:57

## 2019-04-09 RX ADMIN — ASPIRIN 81 MG CHEWABLE TABLET 81 MG: 81 TABLET CHEWABLE at 07:55

## 2019-04-09 RX ADMIN — CALCIUM CARBONATE 600 MG (1,500 MG)-VITAMIN D3 400 UNIT TABLET 1 TABLET: at 17:18

## 2019-04-09 RX ADMIN — TACROLIMUS 3.5 MG: 1 CAPSULE ORAL at 17:18

## 2019-04-09 RX ADMIN — POTASSIUM CHLORIDE 40 MEQ: 750 TABLET, EXTENDED RELEASE ORAL at 08:44

## 2019-04-09 RX ADMIN — MULTIPLE VITAMINS W/ MINERALS TAB 1 TABLET: TAB at 07:55

## 2019-04-09 RX ADMIN — OMEPRAZOLE 40 MG: 20 CAPSULE, DELAYED RELEASE ORAL at 16:26

## 2019-04-09 RX ADMIN — MESALAMINE 2400 MG: 800 TABLET, DELAYED RELEASE ORAL at 07:57

## 2019-04-09 RX ADMIN — ACETAMINOPHEN 650 MG: 325 TABLET, FILM COATED ORAL at 10:29

## 2019-04-09 RX ADMIN — ACETAMINOPHEN 650 MG: 325 TABLET, FILM COATED ORAL at 16:26

## 2019-04-09 RX ADMIN — POTASSIUM CHLORIDE 40 MEQ: 750 TABLET, EXTENDED RELEASE ORAL at 20:29

## 2019-04-09 RX ADMIN — MYCOPHENOLATE MOFETIL 1500 MG: 500 TABLET ORAL at 17:18

## 2019-04-09 RX ADMIN — Medication 500000 UNITS: at 16:26

## 2019-04-09 RX ADMIN — LEVOTHYROXINE SODIUM 100 MCG: 100 TABLET ORAL at 07:53

## 2019-04-09 RX ADMIN — MESALAMINE 2400 MG: 800 TABLET, DELAYED RELEASE ORAL at 20:29

## 2019-04-09 RX ADMIN — MONTELUKAST SODIUM 10 MG: 10 TABLET, COATED ORAL at 20:29

## 2019-04-09 RX ADMIN — CALCIUM CARBONATE 600 MG (1,500 MG)-VITAMIN D3 400 UNIT TABLET 1 TABLET: at 07:55

## 2019-04-09 RX ADMIN — Medication 500000 UNITS: at 12:12

## 2019-04-09 ASSESSMENT — MIFFLIN-ST. JEOR: SCORE: 1322.05

## 2019-04-09 NOTE — PLAN OF CARE
FOCUS/GOAL  Bowel management, Bladder management, Pain management and Medical management    ASSESSMENT, INTERVENTIONS AND CONTINUING PLAN FOR GOAL:      LBM 4/8/19, voiding spontaneously without difficulty. Magnesium replaced via IV this shift, levels currently 2.5. No reports of pain. Coccyx has blanchable redness, mepilex applied. Will continue to follow POC

## 2019-04-09 NOTE — PLAN OF CARE
FOCUS/GOAL  Bowel management, Bladder management, Pain management and Cognition/Memory/Judgment/Problem solving    ASSESSMENT, INTERVENTIONS AND CONTINUING PLAN FOR GOAL:  Pt is alert and oriented, denied pain, sob, or new changes in sensation overnight, continent using urinal while standing at bedside, min assist with ww to transfer, incision sites CDI and ELEONORA, PIV in LUE,, waiting on Mag redraw, no further care concerns at this time continue with POC.

## 2019-04-09 NOTE — PROGRESS NOTES
"  General acute hospital   Acute Rehabilitation Unit  Daily progress note    interval history  No acute events overnight.  This morning Mr. Larios overall feels well.  He is sleeping better, however today says his neck is sore on both sides and thinks he slept on it the wrong way.  He continues to see improvement in strength and function, but is slow and still feels weak in the legs and in particular his thighs.  Has mild chest pain with deep breaths, but this has been present since his surgery and is also improving.  Functionally he is nearing Mod I in his room with a FWW.  He was able to complete a shower Mod I and ambulate ~350 ft with OT.     medications    aspirin  81 mg Oral Daily     bumetanide  2 mg Oral BID     calcium carbonate 600 mg-vitamin D 400 units  1 tablet Oral BID w/meals     DULoxetine  20 mg Oral Daily     fluticasone  1 puff Inhalation Daily     levothyroxine  100 mcg Oral Daily     magnesium oxide  400 mg Oral BID     melatonin  3-9 mg Oral At Bedtime     mesalamine  2,400 mg Oral BID     montelukast  10 mg Oral At Bedtime     multivitamin w/minerals  1 tablet Oral Daily     mycophenolate  1,500 mg Oral BID IS     nystatin  500,000 Units Oral 4x Daily     omeprazole  40 mg Oral BID AC     potassium chloride ER  40 mEq Oral BID     predniSONE  10 mg Oral QPM     predniSONE  10 mg Oral Daily     rosuvastatin  10 mg Oral Daily     [START ON 4/11/2019] sulfamethoxazole-trimethoprim  1 tablet Oral Q Mon Thurs AM     tacrolimus  3 mg Oral QAM     tacrolimus  3.5 mg Oral QPM     valGANciclovir  900 mg Oral Daily        acetaminophen, albuterol, alum & mag hydroxide-simethicone, azelastine, calcium carbonate, magnesium sulfate, naloxone, oxyCODONE, polyethylene glycol, senna-docusate     physical exam  /77 (BP Location: Right arm)   Pulse 90   Temp 96.3  F (35.7  C) (Oral)   Resp 16   Ht 1.727 m (5' 8\")   Wt 51.8 kg (114 lb 1.6 oz)   SpO2 99%   BMI 17.35 kg/m  "   Gen: awake alert nad  HEENT: mmm  Cardio: reg  Pulm: non labored clear on room air  Abd: soft non distended non tender  Ext: warm dry trace pedal edema  Neuro/MSK: alert speech clear moves all extremities.    labs  Lab Results   Component Value Date    WBC 9.4 04/08/2019     Lab Results   Component Value Date    RBC 3.04 04/08/2019     Lab Results   Component Value Date    HGB 8.9 04/08/2019     Lab Results   Component Value Date    HCT 29.0 04/08/2019     Lab Results   Component Value Date    MCV 95 04/08/2019     Lab Results   Component Value Date    MCH 29.3 04/08/2019     Lab Results   Component Value Date    MCHC 30.7 04/08/2019     Lab Results   Component Value Date    RDW 17.4 04/08/2019     Lab Results   Component Value Date     04/08/2019     Last Comprehensive Metabolic Panel:  Sodium   Date Value Ref Range Status   04/08/2019 141 133 - 144 mmol/L Final     Potassium   Date Value Ref Range Status   04/08/2019 4.2 3.4 - 5.3 mmol/L Final     Chloride   Date Value Ref Range Status   04/08/2019 108 94 - 109 mmol/L Final     Carbon Dioxide   Date Value Ref Range Status   04/08/2019 24 20 - 32 mmol/L Final     Anion Gap   Date Value Ref Range Status   04/08/2019 9 3 - 14 mmol/L Final     Glucose   Date Value Ref Range Status   04/08/2019 101 (H) 70 - 99 mg/dL Final     Urea Nitrogen   Date Value Ref Range Status   04/08/2019 46 (H) 7 - 30 mg/dL Final     Creatinine   Date Value Ref Range Status   04/08/2019 1.15 0.66 - 1.25 mg/dL Final     GFR Estimate   Date Value Ref Range Status   04/08/2019 71 >60 mL/min/[1.73_m2] Final     Comment:     Non  GFR Calc  Starting 12/18/2018, serum creatinine based estimated GFR (eGFR) will be   calculated using the Chronic Kidney Disease Epidemiology Collaboration   (CKD-EPI) equation.       Calcium   Date Value Ref Range Status   04/08/2019 8.1 (L) 8.5 - 10.1 mg/dL Final         Rehabilitation - continue comprehensive acute inpatient rehabilitation  program with multidisciplinary approach including therapies, rehab nursing, and physiatry following. See interval history for updates.      assessment and plan    Mr. Everton Larios is 54 year old year old man with a past medical history of hypothyroidism, GERD, BPH, depression, paroxysmal A-fib, NICM s/p OHT 2/24/2019.  Post operative course complicated by FRANKLIN, RV Dysfunction, HCAP and impaired activity tolerance and strength. Admitted to ARU 4/3/2019 for ongoing rehabilitation.    NICM s/p OHT 2/24/2019- with RV dysfunction.    Immunosuppresion: cellcept, tac (goal 8-10) dose increased 4/8, pred,   Prophylaxis- nystatin, bactrim, valcyte   Continue crestor, asa   -appreciate ongoing assistance per hosp/cards  -reduce bumex 2 mg bid- potassium to bid  -daily weights.  -next biopsy 4/10  -continue reg diet, and fluid restriction.   -sternal precautions- : 20 lbs    FRANKLIN on CKD- felt to be ATN, short course of dialysis.  Cr. Continues to improve. 1.15 4/8  -trend bmp    Hypomagnesemia- mag 1.4 4/8 on mag ox 400 mg bid, bumex reduced 4/8  -replace per protocol today, on repeat was 2.5.   -trend    HCAP- completed course of  Bactrim 4/5- breathing improved per patient report.     Asthma- breathing stable/improved. continue fluticasone, singulair    Hypothyroidism- continue levothyroxine 100 mcg    Anemia- history of iron deficiency anemia.  Hgb stable 8.9 4/8    UC- continue pta mesalamine    Pierce's esophagus- continues ppi    Depression- mood labile no verbalized depression. continue cymbalata    Right Chest Wall hematoma- s/p drainage in OR 3/21      1. Adjustment to disability:  Mood stable monitor  2. Fen: reg  3. Bowel: continent  4. Bladder: frequent/continent  5. DVT Prophylaxis: mechanical/ amb  6. GI Prophylaxis: ppi  7. Code: full  8. Disposition: home ~ 10 days  9. ELOS: 10 days  10. Follow up Appointments on Discharge: cv surgery, cardiology        Alonso Christie MD  Department of Rehabilitation  Medicine  Pager: [phone]    Time Spent on this Encounter   I, Alonso Christie, spent a total of 25 minutes bedside and on the inpatient unit today managing the care of Everton Larios.  Over 50% of my time on the unit was spent counseling the patient and /or coordinating care regarding medical and functional progress. See note for details.

## 2019-04-09 NOTE — PROGRESS NOTES
[facility] Progress Note - Hospitalist Service       Date of Admission:  4/3/2019    Assessment & Plan   Everton Larios is a 54 year old male with history of NICM, PAF, CKD, UC, Pierce's esophagus, asthma, PACO, hypothyroidism, and depression admitted to [facility] with cardiogenic shock s/p OHT (2/24). Hospital course c/b ICU delirium, RV dysfunction, FRANKLIN requiring HD (last 3/23), HCAP, severe malnutrition, and hematoma of prior AICD site s/p washout (3/21). Transferred to ARU for ongoing rehabilitation.     1. NICM s/p OHT (2/24):  Echo 3/26 showed evidence of LVH, EF 60-65%, dilated and hypertrophic RV with RV systolic dysfunction noted. RHC 3/28 showed elevated filling pressures, mild pHTN, and normal cardiac output. Biopsy showed evidence of ischemic/reperfusion injury, but no rejection. Clinically stable. Weight down 7 lbs since admission to ARU. Appears euvolemic on exam. No hypoxia. Sternotomy healing well. Tacro level 7.5 on 4/8 (goal 8-10), dose adjusted per Cards II recs.   - Tacrolimus 3/3.5mg BID, repeat tacro level 4/10  - Mycophenolate 1500mg BID  - Prednisone 10mg daily  - Prophylaxis:  Bactrim. Nystatin. Valcyte.   - Bumex decreased to 2mg BID; cont K supplement  - Cont daily weights, I/O's  - RHC with biopsy due 4/10    2. FRANKLIN on CKD:  Baseline Cr 0.8-1.0. Developed FRANKLIN secondary to ischemic ATN in setting of cardiogenic shock, requiring CRRT 3/8 then transitioned to HD 3/14-3/23. Cr continues to improve, most recently 1.15.  - BMP q M/Th     3. HCAP:  Sputum culture 3/20 grew Klebsiella oxytoca. CXR 3/22 noted retrocardiac opacity. Completed course of abx 4/4. Clinically stable. Afebrile. Vitals stable. No hypoxia.     4. UC:  No acute concerns. Cont PTA mesalamine.     5. Hypothyroidism:  TSH 1.05 on 3/9/19. Cont PTA levothyroxine.     6. Depression:  No acute concerns. Cont PTA cymbalta.     7. GERD:  Hx Pierce's. Cont PPI therapy.     8. Mild  intermittent asthma:  No wheezing one exam. Cont PTA fluticasone and singulair.       The patient's care was discussed with the Patient and Primary team.    Mars Day PA-C  Hospitalist Service  [facility], [place]    ______________________________________________________________________    Interval History   No acute events overnight. Weight up but patient denies any worsening edema. Notes improved appetite and PO intake. Denies any dyspnea or chest pain. No orthopnea or PND. No GI to  complaints. No cough or sputum production.      Data reviewed today: I reviewed all medications, new labs and imaging results over the last 24 hours. I personally reviewed no images or EKG's today.    Physical Exam   Vital Signs: Temp: 96.5  F (35.8  C) Temp src: Oral BP: 118/78 Pulse: 87   Resp: 16 SpO2: 99 % O2 Device: None (Room air)    Weight: 114 lbs 1.6 oz    GENERAL:  Awake. Alert. Oriented x 3. NAD.   HEENT:  Mucous membranes moist.   CV:  RRR. S1, S2. No murmurs appreciated.   RESPIRATORY:  Lungs CTAB with no wheezing, rales, rhonchi.   GI:  Abdomen soft, non-distended. Active bowel sounds. No tenderness.    NEUROLOGICAL:  Grossly non-focal. Moves all extremities.    EXTREMITIES:  No peripheral edema. No calf tenderness. Intact bilateral pedal pulses.   SKIN:  No jaundice, rashes, wounds or lesions.      Data   ROUTINE IP LABS (Last four results)  Recent Labs   Lab 04/08/19  2144 04/08/19  0555 04/04/19  0840   NA  --  141 137   POTASSIUM  --  4.2 4.2   CHLORIDE  --  108 103   CO2  --  24 22   ANIONGAP  --  9 12   GLC  --  101* 162*   BUN  --  46* 45*   CR  --  1.15 1.58*   RADAMES  --  8.1* 7.9*   MAG 2.5* 1.4*  --    PHOS  --  2.8  --    PROTTOTAL  --   --  5.8*   ALBUMIN  --   --  2.9*   BILITOTAL  --   --  0.6   ALKPHOS  --   --  139   AST  --   --  16   ALT  --   --  26     Recent Labs   Lab 04/08/19  0555 04/04/19  0840   WBC 9.4 8.1   RBC 3.04* 3.16*   HGB 8.9* 9.1*   HCT 29.0* 29.6*    MCV 95 94   MCH 29.3 28.8   MCHC 30.7* 30.7*   RDW 17.4* 18.0*    267     No lab results found in last 7 days.     Glucose Values Latest Ref Rng & Units 3/30/2019 3/30/2019 3/31/2019 4/1/2019 4/2/2019 4/4/2019 4/8/2019   Bedside Glucose (mg/dl )  - -- -- -- -- -- -- --   GLUCOSE 70 - 99 mg/dL 101(H) 105(H) 126(H) 86 96 162(H) 101(H)   Some recent data might be hidden

## 2019-04-09 NOTE — PLAN OF CARE
"PT: pt is progressing well, limited by weakness fatigue and SOB. Pt is IND for all bed mobility and sit to stand fro 20\" and above CGA 19\" and below. Pt demo up and down 4x2 steps with rail in AM and 4min x 4 level two on Nustep in PM. Pt amb to and form PT gym x 2 today with PT needing CGA for all.   -10 PM due to fatigue.   "

## 2019-04-09 NOTE — TELEPHONE ENCOUNTER
Health Call Center    Phone Message    May a detailed message be left on voicemail: yes    Reason for Call: Other: Alie from [facility] called because she is trying to set up a ride to and from the pt's appointment tomorrow, and they are needing to know when the pt will be ready to be discharged as well as what floor the suite they're on is.     Action Taken: Message routed to:  [facility] ([facility]): Cardiology

## 2019-04-09 NOTE — PLAN OF CARE
FOCUS/GOAL  Bowel management, Bladder management and Mobility    ASSESSMENT, INTERVENTIONS AND CONTINUING PLAN FOR GOAL:  AOX4, uses call light and able to make needs known. Upgraded to Mod I with walker in room today. CO pain in neck managed with PRN tylenol with relief. Had 780 ml of fluids this shift. Denies nausea, SOB with exertion, and cooperative with cares. Continue to manage as per POC.

## 2019-04-10 ENCOUNTER — APPOINTMENT (OUTPATIENT)
Dept: MEDSURG UNIT | Facility: CLINIC | Age: 54
DRG: 949 | End: 2019-04-10
Attending: PHYSICAL MEDICINE & REHABILITATION
Payer: COMMERCIAL

## 2019-04-10 LAB
ANION GAP SERPL CALCULATED.3IONS-SCNC: 9 MMOL/L (ref 3–14)
BUN SERPL-MCNC: 52 MG/DL (ref 7–30)
CALCIUM SERPL-MCNC: 8.7 MG/DL (ref 8.5–10.1)
CHLORIDE SERPL-SCNC: 106 MMOL/L (ref 94–109)
CO2 SERPL-SCNC: 24 MMOL/L (ref 20–32)
CREAT SERPL-MCNC: 1.3 MG/DL (ref 0.66–1.25)
ERYTHROCYTE [DISTWIDTH] IN BLOOD BY AUTOMATED COUNT: 17.2 % (ref 10–15)
GFR SERPL CREATININE-BSD FRML MDRD: 61 ML/MIN/{1.73_M2}
GLUCOSE SERPL-MCNC: 76 MG/DL (ref 70–99)
HCT VFR BLD AUTO: 32.8 % (ref 40–53)
HGB BLD-MCNC: 9.9 G/DL (ref 13.3–17.7)
MAGNESIUM SERPL-MCNC: 2.3 MG/DL (ref 1.6–2.3)
MCH RBC QN AUTO: 29.2 PG (ref 26.5–33)
MCHC RBC AUTO-ENTMCNC: 30.2 G/DL (ref 31.5–36.5)
MCV RBC AUTO: 97 FL (ref 78–100)
PLATELET # BLD AUTO: 177 10E9/L (ref 150–450)
POTASSIUM SERPL-SCNC: 4.8 MMOL/L (ref 3.4–5.3)
RBC # BLD AUTO: 3.39 10E12/L (ref 4.4–5.9)
SODIUM SERPL-SCNC: 139 MMOL/L (ref 133–144)
TACROLIMUS BLD-MCNC: 9.8 UG/L (ref 5–15)
TME LAST DOSE: NORMAL H
WBC # BLD AUTO: 9.8 10E9/L (ref 4–11)

## 2019-04-10 PROCEDURE — 80197 ASSAY OF TACROLIMUS: CPT | Performed by: PHYSICIAN ASSISTANT

## 2019-04-10 PROCEDURE — 25000132 ZZH RX MED GY IP 250 OP 250 PS 637: Performed by: PHYSICAL MEDICINE & REHABILITATION

## 2019-04-10 PROCEDURE — 88350 IMFLUOR EA ADDL 1ANTB STN PX: CPT | Performed by: PHYSICAL MEDICINE & REHABILITATION

## 2019-04-10 PROCEDURE — 40000166 ZZH STATISTIC PP CARE STAGE 1

## 2019-04-10 PROCEDURE — 25000131 ZZH RX MED GY IP 250 OP 636 PS 637: Performed by: PHYSICIAN ASSISTANT

## 2019-04-10 PROCEDURE — 25000132 ZZH RX MED GY IP 250 OP 250 PS 637: Performed by: PHYSICIAN ASSISTANT

## 2019-04-10 PROCEDURE — 88346 IMFLUOR 1ST 1ANTB STAIN PX: CPT | Performed by: PHYSICAL MEDICINE & REHABILITATION

## 2019-04-10 PROCEDURE — 83735 ASSAY OF MAGNESIUM: CPT | Performed by: PHYSICIAN ASSISTANT

## 2019-04-10 PROCEDURE — 93505 ENDOMYOCARDIAL BIOPSY: CPT | Mod: 26 | Performed by: INTERNAL MEDICINE

## 2019-04-10 PROCEDURE — 97530 THERAPEUTIC ACTIVITIES: CPT | Mod: GO | Performed by: OCCUPATIONAL THERAPIST

## 2019-04-10 PROCEDURE — C1894 INTRO/SHEATH, NON-LASER: HCPCS | Performed by: INTERNAL MEDICINE

## 2019-04-10 PROCEDURE — 27210794 ZZH OR GENERAL SUPPLY STERILE: Performed by: INTERNAL MEDICINE

## 2019-04-10 PROCEDURE — 02BK3ZX EXCISION OF RIGHT VENTRICLE, PERCUTANEOUS APPROACH, DIAGNOSTIC: ICD-10-PCS | Performed by: INTERNAL MEDICINE

## 2019-04-10 PROCEDURE — 36415 COLL VENOUS BLD VENIPUNCTURE: CPT | Performed by: PHYSICIAN ASSISTANT

## 2019-04-10 PROCEDURE — 25000125 ZZHC RX 250: Performed by: INTERNAL MEDICINE

## 2019-04-10 PROCEDURE — 93505 ENDOMYOCARDIAL BIOPSY: CPT | Performed by: INTERNAL MEDICINE

## 2019-04-10 PROCEDURE — 85027 COMPLETE CBC AUTOMATED: CPT | Performed by: PHYSICIAN ASSISTANT

## 2019-04-10 PROCEDURE — 12800006 ZZH R&B REHAB

## 2019-04-10 PROCEDURE — 80048 BASIC METABOLIC PNL TOTAL CA: CPT | Performed by: PHYSICIAN ASSISTANT

## 2019-04-10 PROCEDURE — 88307 TISSUE EXAM BY PATHOLOGIST: CPT | Performed by: PHYSICAL MEDICINE & REHABILITATION

## 2019-04-10 PROCEDURE — 97110 THERAPEUTIC EXERCISES: CPT | Mod: GP | Performed by: PHYSICAL THERAPIST

## 2019-04-10 RX ORDER — LIDOCAINE 40 MG/G
CREAM TOPICAL
Status: COMPLETED | OUTPATIENT
Start: 2019-04-10 | End: 2019-04-10

## 2019-04-10 RX ADMIN — POTASSIUM CHLORIDE 40 MEQ: 750 TABLET, EXTENDED RELEASE ORAL at 15:22

## 2019-04-10 RX ADMIN — MONTELUKAST SODIUM 10 MG: 10 TABLET, COATED ORAL at 20:40

## 2019-04-10 RX ADMIN — POTASSIUM CHLORIDE 40 MEQ: 750 TABLET, EXTENDED RELEASE ORAL at 20:40

## 2019-04-10 RX ADMIN — VALGANCICLOVIR 900 MG: 450 TABLET, FILM COATED ORAL at 08:14

## 2019-04-10 RX ADMIN — OMEPRAZOLE 40 MG: 20 CAPSULE, DELAYED RELEASE ORAL at 16:14

## 2019-04-10 RX ADMIN — Medication 500000 UNITS: at 14:34

## 2019-04-10 RX ADMIN — LIDOCAINE: 40 CREAM TOPICAL at 11:39

## 2019-04-10 RX ADMIN — MAGNESIUM OXIDE TAB 400 MG (241.3 MG ELEMENTAL MG) 400 MG: 400 (241.3 MG) TAB at 20:40

## 2019-04-10 RX ADMIN — DULOXETINE HYDROCHLORIDE 20 MG: 20 CAPSULE, DELAYED RELEASE ORAL at 08:15

## 2019-04-10 RX ADMIN — Medication 500000 UNITS: at 20:40

## 2019-04-10 RX ADMIN — BUMETANIDE 2 MG: 2 TABLET ORAL at 14:34

## 2019-04-10 RX ADMIN — CALCIUM CARBONATE 600 MG (1,500 MG)-VITAMIN D3 400 UNIT TABLET 1 TABLET: at 17:32

## 2019-04-10 RX ADMIN — PREDNISONE 10 MG: 10 TABLET ORAL at 17:33

## 2019-04-10 RX ADMIN — MYCOPHENOLATE MOFETIL 1500 MG: 500 TABLET ORAL at 17:32

## 2019-04-10 RX ADMIN — TACROLIMUS 3 MG: 1 CAPSULE ORAL at 08:14

## 2019-04-10 RX ADMIN — FLUTICASONE FUROATE 1 PUFF: 200 POWDER RESPIRATORY (INHALATION) at 08:15

## 2019-04-10 RX ADMIN — CALCIUM CARBONATE 600 MG (1,500 MG)-VITAMIN D3 400 UNIT TABLET 1 TABLET: at 08:14

## 2019-04-10 RX ADMIN — MULTIPLE VITAMINS W/ MINERALS TAB 1 TABLET: TAB at 08:15

## 2019-04-10 RX ADMIN — LEVOTHYROXINE SODIUM 100 MCG: 100 TABLET ORAL at 08:14

## 2019-04-10 RX ADMIN — MAGNESIUM OXIDE TAB 400 MG (241.3 MG ELEMENTAL MG) 400 MG: 400 (241.3 MG) TAB at 08:14

## 2019-04-10 RX ADMIN — MELATONIN TAB 3 MG 9 MG: 3 TAB at 20:40

## 2019-04-10 RX ADMIN — OMEPRAZOLE 40 MG: 20 CAPSULE, DELAYED RELEASE ORAL at 08:14

## 2019-04-10 RX ADMIN — MESALAMINE 2400 MG: 800 TABLET, DELAYED RELEASE ORAL at 08:14

## 2019-04-10 RX ADMIN — ASPIRIN 81 MG CHEWABLE TABLET 81 MG: 81 TABLET CHEWABLE at 08:14

## 2019-04-10 RX ADMIN — Medication 500000 UNITS: at 08:15

## 2019-04-10 RX ADMIN — MYCOPHENOLATE MOFETIL 1500 MG: 500 TABLET ORAL at 08:14

## 2019-04-10 RX ADMIN — PREDNISONE 10 MG: 10 TABLET ORAL at 08:15

## 2019-04-10 RX ADMIN — BUMETANIDE 2 MG: 2 TABLET ORAL at 08:15

## 2019-04-10 RX ADMIN — MESALAMINE 2400 MG: 800 TABLET, DELAYED RELEASE ORAL at 20:40

## 2019-04-10 RX ADMIN — Medication 500000 UNITS: at 16:14

## 2019-04-10 RX ADMIN — TACROLIMUS 3.5 MG: 1 CAPSULE ORAL at 17:32

## 2019-04-10 RX ADMIN — ROSUVASTATIN CALCIUM 10 MG: 10 TABLET, FILM COATED ORAL at 08:15

## 2019-04-10 ASSESSMENT — MIFFLIN-ST. JEOR
SCORE: 1332.49
SCORE: 1330.67

## 2019-04-10 NOTE — PLAN OF CARE
Missed 30 minutes in pm.  Had just gotten back from heart biopsy, needed to eat lunch.  Unable to reschedule appointment.

## 2019-04-10 NOTE — PROGRESS NOTES
[facility] Progress Note - Hospitalist Service       Date of Admission:  4/3/2019    Assessment & Plan   Everton Larios is a 54 year old male with history of NICM, PAF, CKD, UC, Pierce's esophagus, asthma, PACO, hypothyroidism, and depression admitted to [facility] with cardiogenic shock s/p OHT (2/24). Hospital course c/b ICU delirium, RV dysfunction, FRANKLIN requiring HD (last 3/23), HCAP, severe malnutrition, and hematoma of prior AICD site s/p washout (3/21). Transferred to ARU for ongoing rehabilitation.     1. NICM s/p OHT (2/24):  Echo 3/26 showed evidence of LVH, EF 60-65%, dilated and hypertrophic RV with RV systolic dysfunction noted. RHC 3/28 showed elevated filling pressures, mild pHTN, and normal cardiac output. Biopsy showed evidence of ischemic/reperfusion injury, but no rejection. Clinically stable. Weight up since reducing diuretic dosing. Also noted to have rising Cr. ? Cardiorenal. Tacro level 7.5 on 4/8 (goal 8-10), dose adjusted per Cards II recs.   - Tacrolimus 3/3.5mg BID, repeat tacro level 4/10 pending  - Mycophenolate 1500mg BID  - Prednisone 10mg daily  - Prophylaxis:  Bactrim. Nystatin. Valcyte.   - Bumex decreased to 2mg BID; cont K supplement  - Cont daily weights, I/O's  - Will discuss diuretics with Cards II on 4/11    2. FRANKLIN on CKD:  Baseline Cr 0.8-1.0. Developed FRANKLIN secondary to ischemic ATN in setting of cardiogenic shock, requiring CRRT 3/8 then transitioned to HD 3/14-3/23. Cr down to 1.15 on 4/8, now back up to 1.3. Only med changes included decrease in diuretic dose. Weight increasing although dose not appear grossly hypervolemic. ? Cardiorenal physiology vs CNI nephrotoxicity. Less likely RTA from bactrim.   - Repeat Cr in AM  - Will discuss increasing diuretics with Cards II    3. HCAP:  Sputum culture 3/20 grew Klebsiella oxytoca. CXR 3/22 noted retrocardiac opacity. Completed course of abx 4/4. Clinically stable. Afebrile.  Vitals stable. No hypoxia.     4. UC:  No acute concerns. Cont PTA mesalamine.     5. Hypothyroidism:  TSH 1.05 on 3/9/19. Cont PTA levothyroxine.     6. Depression:  No acute concerns. Cont PTA cymbalta.     7. GERD:  Hx Pierce's. Cont PPI therapy.     8. Mild intermittent asthma:  No wheezing one exam. Cont PTA fluticasone and singulair.       The patient's care was discussed with the Patient and Primary team.    Mars Day PA-C  Hospitalist Service  [facility], [place]    ______________________________________________________________________    Interval History   RHC with biopsy today. Everything went well per patient. Noted feet were a little more swollen this AM, but seem to be better this afternoon. Denies significant worsening of edema, dyspnea, orthopnea, or PND. No chest pain. No other complaints. Appetite continues to improve.     ROS:  Pulm, CV, GI and  performed and negative unless otherwise noted above.     Data reviewed today: I reviewed all medications, new labs and imaging results over the last 24 hours. I personally reviewed no images or EKG's today.    Physical Exam   Vital Signs: Temp: 97.8  F (36.6  C) Temp src: Oral BP: 117/75 Pulse: 95 Heart Rate: 88 Resp: 16 SpO2: 98 % O2 Device: None (Room air)    Weight: 116 lbs 0 oz    GENERAL:  Awake. Alert. Oriented x 3. NAD.   HEENT:  Mucous membranes moist.   CV:  RRR. S1, S2. No murmurs appreciated.   RESPIRATORY:  Lungs CTAB with no wheezing, rales, rhonchi.   GI:  Abdomen soft, non-distended. Active bowel sounds. No tenderness.    NEUROLOGICAL:  Grossly non-focal. Moves all extremities.    EXTREMITIES:  No peripheral edema. No calf tenderness. Intact bilateral pedal pulses.   SKIN:  No jaundice, rashes, wounds or lesions.      Data   ROUTINE IP LABS (Last four results)  Recent Labs   Lab 04/10/19  0840 04/08/19  2144 04/08/19  0555 04/04/19  0840     --  141 137   POTASSIUM 4.8  --  4.2 4.2   CHLORIDE 106   --  108 103   CO2 24  --  24 22   ANIONGAP 9  --  9 12   GLC 76  --  101* 162*   BUN 52*  --  46* 45*   CR 1.30*  --  1.15 1.58*   RADAMES 8.7  --  8.1* 7.9*   MAG 2.3 2.5* 1.4*  --    PHOS  --   --  2.8  --    PROTTOTAL  --   --   --  5.8*   ALBUMIN  --   --   --  2.9*   BILITOTAL  --   --   --  0.6   ALKPHOS  --   --   --  139   AST  --   --   --  16   ALT  --   --   --  26     Recent Labs   Lab 04/10/19  0840 04/08/19  0555 04/04/19  0840   WBC 9.8 9.4 8.1   RBC 3.39* 3.04* 3.16*   HGB 9.9* 8.9* 9.1*   HCT 32.8* 29.0* 29.6*   MCV 97 95 94   MCH 29.2 29.3 28.8   MCHC 30.2* 30.7* 30.7*   RDW 17.2* 17.4* 18.0*    183 267     No lab results found in last 7 days.     Glucose Values Latest Ref Rng & Units 3/30/2019 3/31/2019 4/1/2019 4/2/2019 4/4/2019 4/8/2019 4/10/2019   Bedside Glucose (mg/dl )  - -- -- -- -- -- -- --   GLUCOSE 70 - 99 mg/dL 105(H) 126(H) 86 96 162(H) 101(H) 76   Some recent data might be hidden

## 2019-04-10 NOTE — PLAN OF CARE
FOCUS/GOAL  Bladder management, Pain management, Mobility, Cognition/Memory/Judgment/Problem solving and Safety management    ASSESSMENT, INTERVENTIONS AND CONTINUING PLAN FOR GOAL:  Pt is alert and oriented. Continent of bladder, voiding spontaneously using urinal in the bathroom. No complaints of pain or discomfort overnight. Pt up Mod I in room. Pt slept well between cares. Plan for heart biopsy this morning. Pt uses call light appropriately, able to make needs known. Will continue with POC.

## 2019-04-10 NOTE — PLAN OF CARE
FOCUS/GOAL  Bladder management, Nutrition/Feeding/Swallowing precautions, Medication management, Pain management, Wound care management and Mobility    ASSESSMENT, INTERVENTIONS AND CONTINUING PLAN FOR GOAL:  Pt is alert and oriented x4, VSS, denies nausea. Pt endorses some SOB with longer ambulation distances. Pt is now Mod-I in room with walker, voiding independently using urinal in bathroom. Pt compliant with nursing cares including med administration and dressing changes. Pt has dressing on right chest, small amount of sanguinous drainage, area almost scabbed over. Pt taking potassium chloride medication with pudding as he states it upsets his stomach. Pt requested and given tylenol for some neck pain that he has been having today, medication given with good effect. Pt is scheduled to have a heart biopsy on the [place] tomorrow. Pt to be picked up from unit at 1000.

## 2019-04-10 NOTE — PROGRESS NOTES
[facility] Cardiology  Date: 04/10/2019    History of Present Illness:  Mr. Everton Larios is a 54 year old male with history of heart transplant 02/24/2019.  He presents today for endomyocardial biopsy.  He has been residing at [facility] s/p transplant and plans to go home at the end of the week.  He states he feels well.  Denies fever, chills, nausea, CP, SOB, abdominal pain, dysuria, new LE pain/swelling. The procedural details of the endomyocardial biopsy were discussed in detail with the patient.  Risks and benefits were discussed; discussion included possible allergy to contrast dye in addition to:    Benefit: monitoring of heart transplant  Risks:  - Radiation exposure (X-ray)      (100.0% of patients)  - Bleeding (femoral, retroperitoneal)     (up to 7.0% of patients)  - Dissection (coronaries, great vessels)     (1-3.0% of patients)  - sum total of pericardial effusion, arrhythmia, conduction disturbance  (0.2% of patients)  - sum total of cardiac tamponade, complete AV block   (0.12% of patients)  - right ventricular perforation      (0.42% of patients)  - death        (0.03% of patients)    Physical Exam:  Gen: alert and oriented x4, in no acute distress  CV: regular rate/rhythm. No murmur, rub, gallop  Pulm: CTAB, no rales/rhonchi  LE: no appreciable swelling.    Assessment: 54 year old male here for endomyocardial biopsy. Patient expressed understanding and agrees to move forward with the procedure at this point in time.  All questions were answered.    Plan: proceed with biopsy.      Sharmaine Mariscal PA-C  [facility] Cardiology

## 2019-04-10 NOTE — DISCHARGE INSTRUCTIONS
Follow up appointments    -- Cardiovascular Surgery as scheduled with Elin Jensen NP on Friday, April 26th at 9:00 am.    -- Medication Therapy Management as scheduled with Franko Preciado on Friday, April 26th at 10:30 am.    [facility] Interventional Cardiology - Post Right Heart Cath     AFTER YOU GO HOME:    DO drink plenty of fluids    DO resume your regular diet and medications unless otherwise instructed by your Primary Physician    Do Not scrub the procedure site vigorously    No lotion or powder to the puncture site for 3 days    CALL YOUR PRIMARY PHYSICIAN IF: You may resume all normal activity.  Monitor neck site for bleeding, swelling, or voice changes. If you notice bleeding or swelling immediately apply pressure to the site and call number below to speak with Cardiology Fellow.  If you experience any changes in your breathing you should call your doctor immediately or come to the closest Emergency Department.  Do not drive yourself.    ADDITIONAL INSTRUCTIONS: Medications: You are to resume all home medications including anticoagulation therapy unless otherwise advised by your primary cardiologist or nurse coordinator.    Follow Up: Per your primary cardiology team    If you have any questions or concerns regarding your procedure site please call [phone] at anytime and ask for Cardiology Fellow on call.  They are available 24 hours a day.  You may also contact the Cardiology Clinic after hours number at [phone].                                                       Telephone Numbers [phone] Monday-Friday 8:00 am to 4:30 pm    [phone] After 4:30 pm Monday-Friday, Weekends & Holidays  Ask for Interventional Cardiologist on call. Someone is on call 24 hours/day   [facility] toll free number [phone] Monday-Friday 8:00 am to 4:30 pm   [facility] Emergency Dept [phone]

## 2019-04-10 NOTE — TELEPHONE ENCOUNTER
Patient ride set up successfully and patient in procedure on time. Txp office direct line provided for future questions.

## 2019-04-10 NOTE — PROGRESS NOTES
"  [facility]   Acute Rehabilitation Unit  Daily progress note    interval history  Seen sitting up in chair, has heart biopsy today.  Notes this is an uncomfortable procedure but verbalizes understanding for it.  He is feeling better, slept well last night, does notice decreased urinary frequency with decrease of bumex but feels feet are more swollen.  No sob, headaches, chest pain, n/v, or other concerns.  Looking forward to getting home at end of week.      medications    aspirin  81 mg Oral Daily     bumetanide  2 mg Oral BID     calcium carbonate 600 mg-vitamin D 400 units  1 tablet Oral BID w/meals     DULoxetine  20 mg Oral Daily     fluticasone  1 puff Inhalation Daily     levothyroxine  100 mcg Oral Daily     magnesium oxide  400 mg Oral BID     melatonin  3-9 mg Oral At Bedtime     mesalamine  2,400 mg Oral BID     montelukast  10 mg Oral At Bedtime     multivitamin w/minerals  1 tablet Oral Daily     mycophenolate  1,500 mg Oral BID IS     nystatin  500,000 Units Oral 4x Daily     omeprazole  40 mg Oral BID AC     potassium chloride ER  40 mEq Oral BID     predniSONE  10 mg Oral QPM     predniSONE  10 mg Oral Daily     rosuvastatin  10 mg Oral Daily     [START ON 4/11/2019] sulfamethoxazole-trimethoprim  1 tablet Oral Q Mon Thurs AM     tacrolimus  3 mg Oral QAM     tacrolimus  3.5 mg Oral QPM     valGANciclovir  900 mg Oral Daily        acetaminophen, albuterol, alum & mag hydroxide-simethicone, azelastine, calcium carbonate, magnesium sulfate, naloxone, oxyCODONE, polyethylene glycol, senna-docusate     physical exam  /87 (BP Location: Right arm)   Pulse 87   Temp 96.7  F (35.9  C) (Oral)   Resp 18   Ht 1.727 m (5' 8\")   Wt 52.8 kg (116 lb 6.4 oz)   SpO2 99%   BMI 17.70 kg/m    Gen: awake alert nad  HEENT: mmm  Cardio: reg  Pulm: non labored clear on room air  Abd: soft non distended non tender  Ext: warm dry no leg edema shoes on (patient reports pedal " edema)  Neuro/MSK: alert speech clear moves all extremities.    labs  Lab Results   Component Value Date    WBC 9.8 04/10/2019     Lab Results   Component Value Date    RBC 3.39 04/10/2019     Lab Results   Component Value Date    HGB 9.9 04/10/2019     Lab Results   Component Value Date    HCT 32.8 04/10/2019     Lab Results   Component Value Date    MCV 97 04/10/2019     Lab Results   Component Value Date    MCH 29.2 04/10/2019     Lab Results   Component Value Date    MCHC 30.2 04/10/2019     Lab Results   Component Value Date    RDW 17.2 04/10/2019     Lab Results   Component Value Date     04/10/2019       Last Comprehensive Metabolic Panel:  Sodium   Date Value Ref Range Status   04/10/2019 139 133 - 144 mmol/L Final     Potassium   Date Value Ref Range Status   04/10/2019 4.8 3.4 - 5.3 mmol/L Final     Chloride   Date Value Ref Range Status   04/10/2019 106 94 - 109 mmol/L Final     Carbon Dioxide   Date Value Ref Range Status   04/10/2019 24 20 - 32 mmol/L Final     Anion Gap   Date Value Ref Range Status   04/10/2019 9 3 - 14 mmol/L Final     Glucose   Date Value Ref Range Status   04/10/2019 76 70 - 99 mg/dL Final     Urea Nitrogen   Date Value Ref Range Status   04/10/2019 52 (H) 7 - 30 mg/dL Final     Creatinine   Date Value Ref Range Status   04/10/2019 1.30 (H) 0.66 - 1.25 mg/dL Final     GFR Estimate   Date Value Ref Range Status   04/10/2019 61 >60 mL/min/[1.73_m2] Final     Comment:     Non  GFR Calc  Starting 12/18/2018, serum creatinine based estimated GFR (eGFR) will be   calculated using the Chronic Kidney Disease Epidemiology Collaboration   (CKD-EPI) equation.       Calcium   Date Value Ref Range Status   04/10/2019 8.7 8.5 - 10.1 mg/dL Final         Rehabilitation - continue comprehensive acute inpatient rehabilitation program with multidisciplinary approach including therapies, rehab nursing, and physiatry following. See interval history for updates.      assessment  and plan    Mr. Everton Larios is 54 year old year old man with a past medical history of hypothyroidism, GERD, BPH, depression, paroxysmal A-fib, NICM s/p OHT 2/24/2019.  Post operative course complicated by FRANKLIN, RV Dysfunction, HCAP and impaired activity tolerance and strength. Admitted to ARU 4/3/2019 for ongoing rehabilitation.    NICM s/p OHT 2/24/2019- with RV dysfunction.    Immunosuppresion: cellcept, tac (goal 8-10) dose increased 4/8, pred,   Prophylaxis- nystatin, bactrim, valcyte   Continue crestor, asa   -appreciate ongoing assistance per hosp/cards  -continue bumex 2 mg bid- potassium to bid  -daily weights.  -next biopsy today: 4/10  -continue reg diet, and fluid restriction.   -sternal precautions- : 20 lbs    FRANKLIN on CKD- felt to be ATN, short course of dialysis.  Cr. 1.30 4/10.  -trend bmp    Hypomagnesemia- mag 1.4 4/8 on mag ox 400 mg bid, bumex reduced 4/8.  Mag 2.3 4/10.  -continue oral replacement.  -trend    HCAP- completed course of  Bactrim 4/5- breathing improved per patient report.     Asthma- breathing stable/improved. continue fluticasone, singulair    Hypothyroidism- continue levothyroxine 100 mcg    Anemia- history of iron deficiency anemia.  Hgb stable 8.9 4/8    UC- continue pta mesalamine    Pierce's esophagus- continues ppi    Depression- mood labile no verbalized depression. continue cymbalata    Right Chest Wall hematoma- s/p drainage in OR 3/21      1. Adjustment to disability:  Mood stable monitor  2. Fen: reg  3. Bowel: continent  4. Bladder: continent  5. DVT Prophylaxis: mechanical/ amb  6. GI Prophylaxis: ppi  7. Code: full  8. Disposition: home goal for 4/12.  9. ELOS: 10 days  10. Follow up Appointments on Discharge: cv surgery, cardiology       Ann Marie Torres PA-C  Department of Rehabilitation Medicine  Pager: [phone]

## 2019-04-10 NOTE — PROGRESS NOTES
Pt back from CCL s/p heart biopsy.  VSS.  Pt alert and orient ed x4.  Pt denies any pain.  Rt neck site F/D/I.  Pt will return to [facility] after pt eats.

## 2019-04-10 NOTE — PLAN OF CARE
FOCUS/GOAL  Bowel management and Bladder management    ASSESSMENT, INTERVENTIONS AND CONTINUING PLAN FOR GOAL:  AOX4, uses call light and able to make needs known. Mod I in room with walker. Contient of B&B and uses BR independently. Has been at the U for procedure. Denies pain or SOB, pleasant and cooperative with care. Continue with POC.

## 2019-04-11 LAB — COPATH REPORT: NORMAL

## 2019-04-11 PROCEDURE — 97110 THERAPEUTIC EXERCISES: CPT | Mod: GP | Performed by: PHYSICAL THERAPIST

## 2019-04-11 PROCEDURE — 25000132 ZZH RX MED GY IP 250 OP 250 PS 637: Performed by: PHYSICAL MEDICINE & REHABILITATION

## 2019-04-11 PROCEDURE — 12800006 ZZH R&B REHAB

## 2019-04-11 PROCEDURE — 97110 THERAPEUTIC EXERCISES: CPT | Mod: GO | Performed by: OCCUPATIONAL THERAPIST

## 2019-04-11 PROCEDURE — 99231 SBSQ HOSP IP/OBS SF/LOW 25: CPT | Performed by: NURSE PRACTITIONER

## 2019-04-11 PROCEDURE — 25000131 ZZH RX MED GY IP 250 OP 636 PS 637: Performed by: PHYSICIAN ASSISTANT

## 2019-04-11 PROCEDURE — 97530 THERAPEUTIC ACTIVITIES: CPT | Mod: GP | Performed by: PHYSICAL THERAPIST

## 2019-04-11 PROCEDURE — 25000132 ZZH RX MED GY IP 250 OP 250 PS 637: Performed by: PHYSICIAN ASSISTANT

## 2019-04-11 PROCEDURE — 97535 SELF CARE MNGMENT TRAINING: CPT | Mod: GO | Performed by: OCCUPATIONAL THERAPIST

## 2019-04-11 PROCEDURE — 97116 GAIT TRAINING THERAPY: CPT | Mod: GP | Performed by: PHYSICAL THERAPIST

## 2019-04-11 RX ORDER — POTASSIUM CHLORIDE 1500 MG/1
40 TABLET, EXTENDED RELEASE ORAL 2 TIMES DAILY
Qty: 120 TABLET | Refills: 0 | Status: ON HOLD | OUTPATIENT
Start: 2019-04-11 | End: 2019-05-08

## 2019-04-11 RX ORDER — BUMETANIDE 2 MG/1
2 TABLET ORAL
Qty: 60 TABLET | Refills: 0 | Status: SHIPPED | OUTPATIENT
Start: 2019-04-12 | End: 2019-05-20

## 2019-04-11 RX ORDER — VALGANCICLOVIR 450 MG/1
900 TABLET, FILM COATED ORAL DAILY
Qty: 60 TABLET | Refills: 0 | Status: SHIPPED | OUTPATIENT
Start: 2019-04-12 | End: 2019-04-26

## 2019-04-11 RX ORDER — TACROLIMUS 0.5 MG/1
CAPSULE ORAL
Qty: 30 CAPSULE | Refills: 0 | Status: SHIPPED | OUTPATIENT
Start: 2019-04-11 | End: 2019-04-19

## 2019-04-11 RX ORDER — MULTIPLE VITAMINS W/ MINERALS TAB 9MG-400MCG
1 TAB ORAL DAILY
COMMUNITY
Start: 2019-04-12

## 2019-04-11 RX ORDER — TACROLIMUS 1 MG/1
3 CAPSULE ORAL EVERY MORNING
Qty: 180 CAPSULE | Refills: 0 | Status: SHIPPED | OUTPATIENT
Start: 2019-04-12 | End: 2019-04-19

## 2019-04-11 RX ORDER — SULFAMETHOXAZOLE/TRIMETHOPRIM 800-160 MG
1 TABLET ORAL
Qty: 10 TABLET | Refills: 0 | Status: SHIPPED | OUTPATIENT
Start: 2019-04-15 | End: 2019-04-26

## 2019-04-11 RX ORDER — NYSTATIN 100000/ML
500000 SUSPENSION, ORAL (FINAL DOSE FORM) ORAL 4 TIMES DAILY
Qty: 600 ML | Refills: 0 | Status: SHIPPED | OUTPATIENT
Start: 2019-04-11 | End: 2019-06-18

## 2019-04-11 RX ORDER — ROSUVASTATIN CALCIUM 10 MG/1
10 TABLET, COATED ORAL DAILY
Qty: 30 TABLET | Refills: 0 | Status: SHIPPED | OUTPATIENT
Start: 2019-04-11 | End: 2019-05-19

## 2019-04-11 RX ADMIN — DULOXETINE HYDROCHLORIDE 20 MG: 20 CAPSULE, DELAYED RELEASE ORAL at 08:15

## 2019-04-11 RX ADMIN — MONTELUKAST SODIUM 10 MG: 10 TABLET, COATED ORAL at 20:37

## 2019-04-11 RX ADMIN — POTASSIUM CHLORIDE 40 MEQ: 750 TABLET, EXTENDED RELEASE ORAL at 08:15

## 2019-04-11 RX ADMIN — TACROLIMUS 3 MG: 1 CAPSULE ORAL at 08:14

## 2019-04-11 RX ADMIN — MESALAMINE 2400 MG: 800 TABLET, DELAYED RELEASE ORAL at 20:36

## 2019-04-11 RX ADMIN — MESALAMINE 2400 MG: 800 TABLET, DELAYED RELEASE ORAL at 08:15

## 2019-04-11 RX ADMIN — Medication 500000 UNITS: at 17:00

## 2019-04-11 RX ADMIN — CALCIUM CARBONATE 600 MG (1,500 MG)-VITAMIN D3 400 UNIT TABLET 1 TABLET: at 08:16

## 2019-04-11 RX ADMIN — FLUTICASONE FUROATE 1 PUFF: 200 POWDER RESPIRATORY (INHALATION) at 08:22

## 2019-04-11 RX ADMIN — Medication 500000 UNITS: at 12:04

## 2019-04-11 RX ADMIN — BUMETANIDE 2 MG: 2 TABLET ORAL at 08:14

## 2019-04-11 RX ADMIN — OMEPRAZOLE 40 MG: 20 CAPSULE, DELAYED RELEASE ORAL at 17:00

## 2019-04-11 RX ADMIN — MYCOPHENOLATE MOFETIL 1500 MG: 500 TABLET ORAL at 17:00

## 2019-04-11 RX ADMIN — SULFAMETHOXAZOLE AND TRIMETHOPRIM 1 TABLET: 800; 160 TABLET ORAL at 08:15

## 2019-04-11 RX ADMIN — MULTIPLE VITAMINS W/ MINERALS TAB 1 TABLET: TAB at 08:14

## 2019-04-11 RX ADMIN — MAGNESIUM OXIDE TAB 400 MG (241.3 MG ELEMENTAL MG) 400 MG: 400 (241.3 MG) TAB at 08:15

## 2019-04-11 RX ADMIN — ASPIRIN 81 MG CHEWABLE TABLET 81 MG: 81 TABLET CHEWABLE at 08:14

## 2019-04-11 RX ADMIN — PREDNISONE 10 MG: 10 TABLET ORAL at 17:00

## 2019-04-11 RX ADMIN — MAGNESIUM OXIDE TAB 400 MG (241.3 MG ELEMENTAL MG) 400 MG: 400 (241.3 MG) TAB at 20:37

## 2019-04-11 RX ADMIN — TACROLIMUS 3.5 MG: 1 CAPSULE ORAL at 17:00

## 2019-04-11 RX ADMIN — MYCOPHENOLATE MOFETIL 1500 MG: 500 TABLET ORAL at 08:15

## 2019-04-11 RX ADMIN — PREDNISONE 10 MG: 10 TABLET ORAL at 08:14

## 2019-04-11 RX ADMIN — ACETAMINOPHEN 650 MG: 325 TABLET, FILM COATED ORAL at 08:22

## 2019-04-11 RX ADMIN — POTASSIUM CHLORIDE 40 MEQ: 750 TABLET, EXTENDED RELEASE ORAL at 20:37

## 2019-04-11 RX ADMIN — Medication 500000 UNITS: at 08:22

## 2019-04-11 RX ADMIN — Medication 500000 UNITS: at 20:36

## 2019-04-11 RX ADMIN — VALGANCICLOVIR 900 MG: 450 TABLET, FILM COATED ORAL at 08:14

## 2019-04-11 RX ADMIN — OMEPRAZOLE 40 MG: 20 CAPSULE, DELAYED RELEASE ORAL at 06:18

## 2019-04-11 RX ADMIN — MELATONIN TAB 3 MG 9 MG: 3 TAB at 20:36

## 2019-04-11 RX ADMIN — CALCIUM CARBONATE 600 MG (1,500 MG)-VITAMIN D3 400 UNIT TABLET 1 TABLET: at 17:00

## 2019-04-11 RX ADMIN — ROSUVASTATIN CALCIUM 10 MG: 10 TABLET, FILM COATED ORAL at 08:15

## 2019-04-11 RX ADMIN — LEVOTHYROXINE SODIUM 100 MCG: 100 TABLET ORAL at 08:14

## 2019-04-11 RX ADMIN — BUMETANIDE 2 MG: 2 TABLET ORAL at 13:54

## 2019-04-11 ASSESSMENT — MIFFLIN-ST. JEOR: SCORE: 1334.75

## 2019-04-11 NOTE — PROGRESS NOTES
"  [facility]   Acute Rehabilitation Unit  Daily progress note    interval history  Seen sitting up in chair, reports doing well.  Reviewed medications for home, very stressed about cost and how he is going to financially manage. Verbalizes understanding of importance of medications, reviewed medications and recommended follow up.  Discussed home tomorrow with family, discussed equipment issued, somewhat frustrated that he still needs assistance, like a toilet raiser but grateful to be getting out of hospital.  Sleeping better, no n/v/d, sob, headaches, or fevers.  No urinating symptoms, feels still slight swelling in feet.  Denies other concerns.     medications    aspirin  81 mg Oral Daily     bumetanide  2 mg Oral BID     calcium carbonate 600 mg-vitamin D 400 units  1 tablet Oral BID w/meals     DULoxetine  20 mg Oral Daily     fluticasone  1 puff Inhalation Daily     levothyroxine  100 mcg Oral Daily     magnesium oxide  400 mg Oral BID     melatonin  3-9 mg Oral At Bedtime     mesalamine  2,400 mg Oral BID     montelukast  10 mg Oral At Bedtime     multivitamin w/minerals  1 tablet Oral Daily     mycophenolate  1,500 mg Oral BID IS     nystatin  500,000 Units Oral 4x Daily     omeprazole  40 mg Oral BID AC     potassium chloride ER  40 mEq Oral BID     predniSONE  10 mg Oral QPM     predniSONE  10 mg Oral Daily     rosuvastatin  10 mg Oral Daily     sulfamethoxazole-trimethoprim  1 tablet Oral Q Mon Thurs AM     tacrolimus  3 mg Oral QAM     tacrolimus  3.5 mg Oral QPM     valGANciclovir  900 mg Oral Daily        acetaminophen, albuterol, alum & mag hydroxide-simethicone, azelastine, calcium carbonate, magnesium sulfate, naloxone, polyethylene glycol, senna-docusate     physical exam  /87 (BP Location: Right arm)   Pulse 99   Temp 96.6  F (35.9  C) (Oral)   Resp 16   Ht 1.727 m (5' 8\")   Wt 53 kg (116 lb 14.4 oz)   SpO2 99%   BMI 17.77 kg/m    Gen: awake alert " nad  HEENT: mmm  Cardio: reg  Pulm: non labored clear on room air  Abd: soft non distended non tender  Ext: warm dry no leg edema, mild pedial edema  Neuro/MSK: alert speech clear moves all extremities.    labs  Lab Results   Component Value Date    WBC 9.8 04/10/2019     Lab Results   Component Value Date    RBC 3.39 04/10/2019     Lab Results   Component Value Date    HGB 9.9 04/10/2019     Lab Results   Component Value Date    HCT 32.8 04/10/2019     Lab Results   Component Value Date    MCV 97 04/10/2019     Lab Results   Component Value Date    MCH 29.2 04/10/2019     Lab Results   Component Value Date    MCHC 30.2 04/10/2019     Lab Results   Component Value Date    RDW 17.2 04/10/2019     Lab Results   Component Value Date     04/10/2019       Last Comprehensive Metabolic Panel:  Sodium   Date Value Ref Range Status   04/10/2019 139 133 - 144 mmol/L Final     Potassium   Date Value Ref Range Status   04/10/2019 4.8 3.4 - 5.3 mmol/L Final     Chloride   Date Value Ref Range Status   04/10/2019 106 94 - 109 mmol/L Final     Carbon Dioxide   Date Value Ref Range Status   04/10/2019 24 20 - 32 mmol/L Final     Anion Gap   Date Value Ref Range Status   04/10/2019 9 3 - 14 mmol/L Final     Glucose   Date Value Ref Range Status   04/10/2019 76 70 - 99 mg/dL Final     Urea Nitrogen   Date Value Ref Range Status   04/10/2019 52 (H) 7 - 30 mg/dL Final     Creatinine   Date Value Ref Range Status   04/10/2019 1.30 (H) 0.66 - 1.25 mg/dL Final     GFR Estimate   Date Value Ref Range Status   04/10/2019 61 >60 mL/min/[1.73_m2] Final     Comment:     Non  GFR Calc  Starting 12/18/2018, serum creatinine based estimated GFR (eGFR) will be   calculated using the Chronic Kidney Disease Epidemiology Collaboration   (CKD-EPI) equation.       Calcium   Date Value Ref Range Status   04/10/2019 8.7 8.5 - 10.1 mg/dL Final         Rehabilitation - continue comprehensive acute inpatient rehabilitation program  with multidisciplinary approach including therapies, rehab nursing, and physiatry following. See interval history for updates.      assessment and plan    Mr. Everton Larios is 54 year old year old man with a past medical history of hypothyroidism, GERD, BPH, depression, paroxysmal A-fib, NICM s/p OHT 2/24/2019.  Post operative course complicated by FRANKLIN, RV Dysfunction, HCAP and impaired activity tolerance and strength. Admitted to ARU 4/3/2019 for ongoing rehabilitation.    NICM s/p OHT 2/24/2019- with RV dysfunction.    Immunosuppresion: cellcept, tac (goal 8-10) dose increased 4/8, pred,   Prophylaxis- nystatin, bactrim, valcyte   Continue crestor, asa   -appreciate ongoing assistance per hosp/cards  -continue bumex 2 mg bid- potassium to bid  -daily weights.  -most recent biopsy 4/10- results pending.   -continue reg diet, and fluid restriction.   -sternal precautions- : 20 lbs    FRANKLIN on CKD- felt to be ATN, short course of dialysis.  Cr. 1.30 4/10. Bmp in am  -trend bmp    Hypomagnesemia- mag 1.4 4/8 on mag ox 400 mg bid, bumex reduced 4/8.  Mag 2.3 4/10.  -continue oral replacement.  -trend    HCAP- completed course of  Bactrim 4/5- breathing improved per patient report.     Asthma- breathing stable/improved. continue fluticasone, singulair    Hypothyroidism- continue levothyroxine 100 mcg    Anemia- history of iron deficiency anemia.  Hgb stable 9.9 4/10    UC- continue pta mesalamine    Pierce's esophagus- continues ppi    Depression- mood labile no verbalized depression. continue cymbalata    Right Chest Wall hematoma- s/p drainage in OR 3/21      1. Adjustment to disability:  Mood stable monitor  2. Fen: reg  3. Bowel: continent  4. Bladder: continent  5. DVT Prophylaxis: mechanical/ amb  6. GI Prophylaxis: ppi  7. Code: full  8. Disposition: home goal for 4/12.  9. ELOS: 10 days  10. Follow up Appointments on Discharge: cv surgery, cardiology       Ann Marie Torres PA-C  Department of Rehabilitation  Medicine  Pager: [phone]    I spent a total of 40 minutes face-to-face or managing the care of Everton Larios. Over 50% of my time on the unit was spent counseling the patient and coordinating care. See note for details. Case discussed with Dr. Dixon PM&R, PARVEZ Srivastava NP cardiology

## 2019-04-11 NOTE — PROGRESS NOTES
[facility] Progress Note - Hospitalist Service       Date of Admission:  4/3/2019    Assessment & Plan   Everton Larios is a 54 year old male with history of NICM, PAF, CKD, UC, Pierce's esophagus, asthma, PACO, hypothyroidism, and depression admitted to [facility] with cardiogenic shock s/p OHT (2/24). Hospital course c/b ICU delirium, RV dysfunction, FRANKLIN requiring HD (last 3/23), HCAP, severe malnutrition, and hematoma of prior AICD site s/p washout (3/21). Transferred to ARU for ongoing rehabilitation.     For Today:    Discuss diuretics with Cards II (? Increase AM dose of bumex)    Repeat BMP in AM      1. NICM s/p OHT (2/24):   Clinically stable. Weight up slightly since reducing diuretic dosing but no evidence of hypervolemia on exam. Cr also rising since reducing diuretics, ? Prerenal azotemia d/t HF. Tacro level 9.8 on 4/10 (goal 8-10).  - Tacrolimus 3/3.5mg BID  - Mycophenolate 1500mg BID  - Prednisone 10mg daily  - Prophylaxis:  Bactrim. Nystatin. Valcyte.   - Bumex 2mg BID; cont K supplement  - Cont daily weights, I/O's  - Will discuss diuretics with Cards II today. ? Increase AM dose of bumex to 4mg if rising Cr secondary to volume status.     2. FRANKLIN on CKD:  Baseline Cr 0.8-1.0. Developed FRANKLIN secondary to ischemic ATN in setting of cardiogenic shock, requiring CRRT 3/8 then transitioned to HD 3/14-3/23. Cr down to 1.15 on 4/8, now back up to 1.3. Only med changes included decrease in diuretic dose. ? Prerenal azotemia d/t hypervolemia. DDx also includes tacro nephrotoxicity and RTA from bactrim (less likely).  - Repeat Cr in AM  - Will discuss increasing diuretics with Cards II    3. HCAP:  Sputum culture 3/20 grew Klebsiella oxytoca. CXR 3/22 noted retrocardiac opacity. Completed course of abx 4/4. Clinically stable. Afebrile. Vitals stable. No hypoxia.     4. Ulcerative Colitis:  No acute concerns. Cont PTA mesalamine.     5. Hypothyroidism:  TSH 1.05  on 3/9/19. Cont PTA levothyroxine.     6. Depression:  No acute concerns. Cont PTA cymbalta.     7. GERD:  Hx Pierce's. Cont PPI therapy.     8. Mild intermittent asthma:  No wheezing one exam. Cont PTA fluticasone and singulair.       The patient's care was discussed with the Patient and Primary team.    Mars Day PA-C  Hospitalist Service  [facility], [place]    ______________________________________________________________________    Interval History   Feeling well today. No acute events overnight. Denies any worsening edema or dyspnea. No new complaints.     ROS:  Pulm, CV, GI and  performed and negative unless otherwise noted above.     Data reviewed today: I reviewed all medications, new labs and imaging results over the last 24 hours. I personally reviewed no images or EKG's today.    Physical Exam   Vital Signs: Temp: 96.6  F (35.9  C) Temp src: Oral BP: 147/87 Pulse: 99 Heart Rate: 88 Resp: 16 SpO2: 99 % O2 Device: None (Room air)    Weight: 116 lbs 14.4 oz    GENERAL:  Awake. Alert. Oriented x 3. NAD.   HEENT:  Mucous membranes moist.   CV:  RRR. S1, S2. No murmurs appreciated.   RESPIRATORY:  Lungs CTAB with no wheezing, rales, rhonchi.   GI:  Abdomen soft, non-distended. Active bowel sounds. No tenderness.    NEUROLOGICAL:  Grossly non-focal. Moves all extremities.    EXTREMITIES:  No peripheral edema. No calf tenderness. Intact bilateral pedal pulses.   SKIN:  No jaundice, rashes, wounds or lesions.      Data   ROUTINE IP LABS (Last four results)  Recent Labs   Lab 04/10/19  0840 04/08/19  2144 04/08/19  0555     --  141   POTASSIUM 4.8  --  4.2   CHLORIDE 106  --  108   CO2 24  --  24   ANIONGAP 9  --  9   GLC 76  --  101*   BUN 52*  --  46*   CR 1.30*  --  1.15   RADAMES 8.7  --  8.1*   MAG 2.3 2.5* 1.4*   PHOS  --   --  2.8     Recent Labs   Lab 04/10/19  0840 04/08/19  0555   WBC 9.8 9.4   RBC 3.39* 3.04*   HGB 9.9* 8.9*   HCT 32.8* 29.0*   MCV 97 95   MCH  29.2 29.3   MCHC 30.2* 30.7*   RDW 17.2* 17.4*    183     No lab results found in last 7 days.     Glucose Values Latest Ref Rng & Units 3/30/2019 3/31/2019 4/1/2019 4/2/2019 4/4/2019 4/8/2019 4/10/2019   Bedside Glucose (mg/dl )  - -- -- -- -- -- -- --   GLUCOSE 70 - 99 mg/dL 105(H) 126(H) 86 96 162(H) 101(H) 76   Some recent data might be hidden

## 2019-04-11 NOTE — PLAN OF CARE
FOCUS/GOAL  Bladder management, Medication management, Pain management, Mobility, Cognition/Memory/Judgment/Problem solving and Safety management    ASSESSMENT, INTERVENTIONS AND CONTINUING PLAN FOR GOAL:  Pt is alert and oriented. Continent of bladder, voiding spontaneously using urinal this shift. Medications taken whole with water. Pt is on a 2 liter fluid restriction. Denied pain or discomfort overnight. Pt up Mod I with walker. Pt appeared to be sleeping well during rounds. Uses call light appropriately, able to make needs known. Will continue with POC.

## 2019-04-11 NOTE — CONSULTS
[facility]   Cardiology II Service / Advanced Heart Failure  ARU/TCU Consult Note      Patient: Everton Larios  MRN: [id number]  Admission Date: 4/3/2019  ARU/TCU Day # 8    Assessment and Plan: Everton Larios is a 54 year old male with history of hypothyroidism, asthma, UC, depression, etoh abuse, s/p orthotopic heart transplant 2/24/19 for non ischemic cardiomyopathy and cardiogenic shock requiring IABP. Post-op complicated by RV dysfunction and FRANKLIN requiring short term HD now recovering on ARU.     Recommendations:  -would continue Bumex 2 mg bid, ask patient to call transplant coordinator for any weight gain or swelling between discharge and clinic visit  -decrease prednisone to 10 mg in AM and 5 mg in PM *if* biopsy from 4/10 comes back normal, ok to call [id number] with questions on interpretation   -CMV PCR due 4/17, will have transplant coordinators arrange this w tac level and BMP    # Status post OHT on 2/24/19, history of NICM  # Chronic immunosuppression  * TTE 3/26/19: EF 60-65%, moderate LVH, RV mod dilated and mod reduced function, IVC dilated  * RHC 3/28/19: RA 9, PA 35/19 (25), PCWP 18, Jaden CO/CI 5.4/3.2     Volume Status/Graft Function:   --continue Bumex 2 mg bid      Immunosuppression:   --did not receive Simulect  --continue prednisone per taper with negative bx, currently 10 mg bid  --continue Cellcept 1500 mg BID   --continue tacrolimus 3 mg in AM and 3.5 mg in PM, goal 8-10, 9.8 yesterday but 14 hr trough, recheck early next week as outpt      Prophylaxis:   --CAV: aspirin 81 mg, rosuvastatin 10 mg daily  --Thrush: Nystatin swish and swallow  --PCP: pentamidine given 3/3, now Bactrim DS twice weekly for ppx  --GI: omperazole 40 mg   --CMV: D+/R+, valcyte 900 mg through 5/24, weekly CMV PCR for now per ID through mid April then per protocol  --Osteoporosis: calcium/vitamin D      Serostatus: CMV: D+/R+. EBV: D+/R+     Biopsies:   --#4 3/25: 0R, +pAMR 1 +staining for  "c4d  --repeat bx 3/28 with improved AMR staining and less reactive capillaries   --#5 4/10: pending  --#6 due 4/24  *No biopsies or central line access on left due to SVC graft     #FRANKLIN on iHD, oliguric, resolved     #Klebsiella in sputum  Opacity by CXR 3/22 and GGO in right ling 3/19. S/p cefepime/zosyn x 7 days (-3/16), improved.     ================================================================    Subjective/24-Hr Events:   Last 24 hr care team notes reviewed. Feeling well, ready for discharge. Denies LE edema, orthopnea, PND. Tolerating therapies. No new complaints.     ROS:  4 point ROS including respiratory, CV, GI and  (other than that noted in the HPI) is negative.     Medications: Reviewed in EPIC.     Physical Exam:   /87 (BP Location: Right arm)   Pulse 99   Temp 96.6  F (35.9  C) (Oral)   Resp 16   Ht 1.727 m (5' 8\")   Wt 53 kg (116 lb 14.4 oz)   SpO2 99%   BMI 17.77 kg/m      GENERAL: Appears comfortable, in no distress.  HEENT: Eye symmetrical, no discharge or icterus bilaterally. Mucous membranes moist and without lesions.  NECK: Supple, JVD not viisble at 90 degrees.   CV: Tachycardic and regular, +S1S2, no murmur, rub, or gallop.   RESPIRATORY: Respirations regular, even, and unlabored. Lungs CTA throughout.   GI: Soft and non distended with normoactive bowel sounds present in all quadrants. No tenderness, rebound, guarding.   EXTREMITIES: No peripheral edema. 2+ bilateral pedal pulses.   NEUROLOGIC: Alert and oriented x 3. No focal deficits.   MUSCULOSKELETAL: No joint swelling or tenderness.   SKIN: No jaundice. No rashes or lesions.     Labs:  CMP  Recent Labs   Lab 04/10/19  0840 04/08/19  2144 04/08/19  0555     --  141   POTASSIUM 4.8  --  4.2   CHLORIDE 106  --  108   CO2 24  --  24   ANIONGAP 9  --  9   GLC 76  --  101*   BUN 52*  --  46*   CR 1.30*  --  1.15   GFRESTIMATED 61  --  71   GFRESTBLACK 71  --  82   RADAMES 8.7  --  8.1*   MAG 2.3 2.5* 1.4*   PHOS  --   --  " 2.8       CBC  Recent Labs   Lab 04/10/19  0840 04/08/19  0555   WBC 9.8 9.4   RBC 3.39* 3.04*   HGB 9.9* 8.9*   HCT 32.8* 29.0*   MCV 97 95   MCH 29.2 29.3   MCHC 30.2* 30.7*   RDW 17.2* 17.4*    183     Time/Communication  I personally spent a total of 25 minutes. Of that 15 minutes was counseling/coordination of patient's care. Plan of care discussed with patient. See my note above for details.        Candida Srivastava DNP, NP-C  Advanced Heart Failure/Cardiology II Service  Pager [phone]

## 2019-04-11 NOTE — PLAN OF CARE
Physical Therapy Discharge Summary    Reason for therapy discharge:    Discharged to Cardiac Rehab     Progress towards therapy goal(s). See goals on Care Plan in [facility] electronic health record for goal details.  Goals met    Therapy recommendation(s):    Continued therapy is recommended.  Rationale/Recommendations:  Has progressed in IP rehab to allow for safe discharge to his parent's home w/ transition to his own home in 30 days.  Completed 6 MWT w/ ww, 147 meters, OMNI 7. Age/wt/ht/gender predicted is 619 meters, his score is 24% of predicted.

## 2019-04-11 NOTE — PLAN OF CARE
Focus: Physio  D: Doing well today. Up MOD I in room with walker without problems. Continent of bowel and bladder. Reviewed appointments and home medications. P: Continue current plan of care.

## 2019-04-11 NOTE — PLAN OF CARE
FOCUS/GOAL  Bowel management, Bladder management, Nutrition/Feeding/Swallowing precautions, Pain management, Discharge planning, Mobility and Cognition/Memory/Judgment/Problem solving    ASSESSMENT, INTERVENTIONS AND CONTINUING PLAN FOR GOAL:    A&O, Mod I FWW, voiding in urinal without difficulty. Good appetite tonight, ate 100% of meal, patient adhering to fluid restrictions. No reports of pain, expected discharge 4/12. Will continue to follow POC.

## 2019-04-11 NOTE — PLAN OF CARE
Acute Rehab Care Conference/Team Rounds      Type: Team Rounds    Present: Dr Joslyn Raymundo, Ann Marie Torres PA, Nadja Sood LICSW, Gosia Rivas OT, Xiomara Davis PT, Kayce Noyola SLP, Edelmira Lamar RN, Mali Stark Dietician, Keiko Summers , Cirilo Gonzalezlain        Discharge Barriers/Treatment/Education    Rehab Diagnosis: heart transplant for nonischemic cardiomyopathy    Active Medical Co-morbidities/Prognosis: CKD, HCAP treated, anemia, depression, A fib    Safety: Pt is alert and oriented. Uses call light appropriately, able to make needs known. Will patient need MAP prior to discharge?    Pain: No complaints of pain or discomfort.    Medications, Skin, Tubes/Lines: Medications taken whole with water. Skin with chest incisions, left hand skin tear, right arm skin tear and blanchable redness to coccyx    Swallowing/Nutrition:    Bowel/Bladder: Continent of bladder, voiding without difficulty using toilet. Continent of bowel, last BM 4/10    Psychosocial: Pt resides with his wife. Goal to return home with her continued support. Team working towards a safe discharge plan.     ADLs/IADLs: Pt is mod IND with FB dressing, toileting, bathing, and G/H tasks. Pt ordered RTS through [employer]. Pt is mod IND with simple meal prep with FWW. On track for discharge tomorrow with OP CR.     Mobility:  Up ind in room w/ ww.  On track to discharge home tomorrow w/ both walker and cane for household mobility, OP CR f/u.     Cognition/Language: wnl    Community Re-Entry: walker, ind, limited distance ~ 100 meters    Transportation: Not a  due to sternal precautions; car transfer w/ supervision depending on height of seat    Decision maker: self    Plan of Care and goals reviewed and updated.    Discharge Plan/Recommendations    Fall Precautions: modify    Overall plan for the patient: on track for discharge to home tomorrow. Will have cardiac rehab as outpatient.       Utilization  Review and Continued Stay Justification    Medical Necessity Criteria:    For any criteria that is not met, please document reason and plan for discharge, transfer, or modification of plan of care to address.    Requires intensive rehabilitation program to treat functional deficits?: Yes    Requires 3x per week or greater involvement of rehabilitation physician to oversee rehabilitation program?: Yes    Requires rehabilitation nursing interventions?: Yes    Patient is making functional progress?: Yes    There is a potential for additional functional progress? Yes    Patient is participating in therapy 3 hours per day a minimum of 5 days per week or 15 hours per week in 7 day period?:Yes    Has discharge needs that require coordinated discharge planning approach?:Yes        Final Physician Sign off    Statement of Approval: I agree with all the recommendations detailed in this document.      Patient Goals        1. Pt will discharge home/community setting upon completion of therapy/RN goals.  2. Pt and family will be involved in discharge planning and made aware of services available to meet pts needs.          OT Frequency: daily for  min  OT goal: hygiene/grooming: modified independent, while standing, within precautions  OT goal: upper body dressing: Modified independent, including set-up/clothing retrieval, within precautions  OT goal: lower body dressing: Modified independent, including set-up/clothing retrieval, within precautions  OT goal: upper body bathing: Modified independent, within precautions  OT goal: lower body bathing: Modified independent, with precautions, using adaptive equipment        OT goal: toilet transfer/toileting: Modified independent, cleaning and garment management, within precautions, toilet transfer, using adaptive equipment: GOAL MET  OT goal: meal preparation: Modified independent, with simple meal preparation, within precautions, ambulatory level, using adaptive equipment:  GOAL MET  OT goal: home management: Modified independent, with light demand household tasks, ambulatory level, within precautions, using adaptive equipment: GOAL MET  OT goal: cognitive: Patient/caregiver will verbalize understanding of cognitive assessment results/recommendations as needed for safe discharge planning  OT goal: perform aerobic activity with stable cardiovascular response: continuous activity, 10 minutes: GOAL MET  OT goal 1: Pt will demo mod IND with shower chair utilizing appropriate AE/DME.: GOAL MET  OT goal 2: Pt will demo IND with BUE HEP within sternal precautions to increase safety and IND with ADLs/IADLs and functional mobility: GOAL MET     PT Frequency: daily x 90 minutes     PT goal: transfers: (met)  PT goal: gait: (met)  PT goal: stairs: (met)  PT goal: perform aerobic activity with stable cardiovascular response: continuous activity, 15 minutes, NuStep     PT goal 1: (met)  PT Goal 2: ind w/ HEP for LE strengthening and aerobic conditioning        Nursing goals     Goal: Wound Management: Pt will demonstrate proper technique to clean and prevent infection in all incision and wound sites     Patient/Family Goal: Medication Management: Pt will successfully complete map prior to discharge to show understanding of medicationr regimen     Individualized Goal 1: Pt will complete transplant education prior to discharge to successfully manage transplant at home.

## 2019-04-11 NOTE — PROGRESS NOTES
Occupational Therapy Discharge Summary    Reason for therapy discharge:    Discharged to home with outpatient therapy.    Progress towards therapy goal(s). See [facility] on [facility] for goal details.  Goals met    Therapy recommendation(s):    Continued therapy is recommended.  Rationale/Recommendations:  Recommend OP cardiac rehab. .Pt is mod IND with toileting, bathing, and LBD tasks. Pt is IND with UBD and G/H tasks standing. Recommend pt to use shower and RTS upon discharge. Pt is able to transfer in/out of shower with mod IND. Pt is mod IND with light meal prep tasks and kitchen mobility with FWW. Provided pt BUE HEP with red thera-band and UB calisthenic exercises to increase UB strengthening/endurance.

## 2019-04-12 ENCOUNTER — RECORDS - HEALTHEAST (OUTPATIENT)
Dept: ADMINISTRATIVE | Facility: OTHER | Age: 54
End: 2019-04-12

## 2019-04-12 VITALS
BODY MASS INDEX: 17.78 KG/M2 | HEART RATE: 94 BPM | SYSTOLIC BLOOD PRESSURE: 112 MMHG | RESPIRATION RATE: 16 BRPM | DIASTOLIC BLOOD PRESSURE: 72 MMHG | OXYGEN SATURATION: 98 % | HEIGHT: 68 IN | WEIGHT: 117.3 LBS | TEMPERATURE: 97.4 F

## 2019-04-12 LAB
ANION GAP SERPL CALCULATED.3IONS-SCNC: 10 MMOL/L (ref 3–14)
BUN SERPL-MCNC: 52 MG/DL (ref 7–30)
CALCIUM SERPL-MCNC: 8.1 MG/DL (ref 8.5–10.1)
CHLORIDE SERPL-SCNC: 108 MMOL/L (ref 94–109)
CO2 SERPL-SCNC: 22 MMOL/L (ref 20–32)
CREAT SERPL-MCNC: 1.18 MG/DL (ref 0.66–1.25)
GFR SERPL CREATININE-BSD FRML MDRD: 69 ML/MIN/{1.73_M2}
GLUCOSE SERPL-MCNC: 90 MG/DL (ref 70–99)
MAGNESIUM SERPL-MCNC: 1.8 MG/DL (ref 1.6–2.3)
POTASSIUM SERPL-SCNC: 4.3 MMOL/L (ref 3.4–5.3)
SODIUM SERPL-SCNC: 140 MMOL/L (ref 133–144)

## 2019-04-12 PROCEDURE — 25000131 ZZH RX MED GY IP 250 OP 636 PS 637: Performed by: PHYSICIAN ASSISTANT

## 2019-04-12 PROCEDURE — 36415 COLL VENOUS BLD VENIPUNCTURE: CPT | Performed by: PHYSICIAN ASSISTANT

## 2019-04-12 PROCEDURE — 25000132 ZZH RX MED GY IP 250 OP 250 PS 637: Performed by: PHYSICAL MEDICINE & REHABILITATION

## 2019-04-12 PROCEDURE — 80048 BASIC METABOLIC PNL TOTAL CA: CPT | Performed by: PHYSICIAN ASSISTANT

## 2019-04-12 PROCEDURE — 25000132 ZZH RX MED GY IP 250 OP 250 PS 637: Performed by: PHYSICIAN ASSISTANT

## 2019-04-12 PROCEDURE — 83735 ASSAY OF MAGNESIUM: CPT | Performed by: PHYSICIAN ASSISTANT

## 2019-04-12 RX ORDER — MYCOPHENOLATE MOFETIL 250 MG/1
1500 CAPSULE ORAL 2 TIMES DAILY
Qty: 360 CAPSULE | Refills: 0 | Status: SHIPPED | OUTPATIENT
Start: 2019-04-12 | End: 2019-04-24

## 2019-04-12 RX ORDER — PREDNISONE 5 MG/1
5 TABLET ORAL EVERY EVENING
Qty: 90 TABLET | Refills: 0 | Status: SHIPPED | OUTPATIENT
Start: 2019-04-12 | End: 2019-04-26

## 2019-04-12 RX ADMIN — VALGANCICLOVIR 900 MG: 450 TABLET, FILM COATED ORAL at 08:35

## 2019-04-12 RX ADMIN — MYCOPHENOLATE MOFETIL 1500 MG: 500 TABLET ORAL at 08:34

## 2019-04-12 RX ADMIN — PREDNISONE 10 MG: 10 TABLET ORAL at 08:35

## 2019-04-12 RX ADMIN — ACETAMINOPHEN 650 MG: 325 TABLET, FILM COATED ORAL at 09:39

## 2019-04-12 RX ADMIN — LEVOTHYROXINE SODIUM 100 MCG: 100 TABLET ORAL at 08:35

## 2019-04-12 RX ADMIN — FLUTICASONE FUROATE 1 PUFF: 200 POWDER RESPIRATORY (INHALATION) at 08:36

## 2019-04-12 RX ADMIN — DULOXETINE HYDROCHLORIDE 20 MG: 20 CAPSULE, DELAYED RELEASE ORAL at 08:34

## 2019-04-12 RX ADMIN — Medication 500000 UNITS: at 08:35

## 2019-04-12 RX ADMIN — ASPIRIN 81 MG CHEWABLE TABLET 81 MG: 81 TABLET CHEWABLE at 08:35

## 2019-04-12 RX ADMIN — TACROLIMUS 3 MG: 1 CAPSULE ORAL at 08:34

## 2019-04-12 RX ADMIN — POTASSIUM CHLORIDE 40 MEQ: 750 TABLET, EXTENDED RELEASE ORAL at 08:35

## 2019-04-12 RX ADMIN — MESALAMINE 2400 MG: 800 TABLET, DELAYED RELEASE ORAL at 08:34

## 2019-04-12 RX ADMIN — MAGNESIUM OXIDE TAB 400 MG (241.3 MG ELEMENTAL MG) 400 MG: 400 (241.3 MG) TAB at 08:35

## 2019-04-12 RX ADMIN — ROSUVASTATIN CALCIUM 10 MG: 10 TABLET, FILM COATED ORAL at 08:35

## 2019-04-12 RX ADMIN — OMEPRAZOLE 40 MG: 20 CAPSULE, DELAYED RELEASE ORAL at 06:36

## 2019-04-12 RX ADMIN — CALCIUM CARBONATE 600 MG (1,500 MG)-VITAMIN D3 400 UNIT TABLET 1 TABLET: at 08:34

## 2019-04-12 RX ADMIN — BUMETANIDE 2 MG: 2 TABLET ORAL at 08:35

## 2019-04-12 RX ADMIN — MULTIPLE VITAMINS W/ MINERALS TAB 1 TABLET: TAB at 08:35

## 2019-04-12 ASSESSMENT — MIFFLIN-ST. JEOR: SCORE: 1336.57

## 2019-04-12 NOTE — PLAN OF CARE
FOCUS/GOAL  Bladder management, Pain management, Discharge planning, Mobility, Cognition/Memory/Judgment/Problem solving and Safety management    ASSESSMENT, INTERVENTIONS AND CONTINUING PLAN FOR GOAL:  Pt is alert and oriented. Continent of bladder, voiding without difficulty using urinal at bedside. Up Mod I with walker, independent with bed mobility. Pt slept well between cares. Uses call light appropriately, able to make needs known. Plan for discharge today. Will continue with POC.

## 2019-04-12 NOTE — DISCHARGE SUMMARY
[facility]   Acute Rehabilitation Unit  Discharge summary     Date of Admission: 4/3/2019  Date of Discharge: 4/12/2019  Disposition: home  Primary Care Physician: Fredrick Wolff  Attending physician: Dr. Raymundo      discharge diagnosis  NICM s/p heart transplant  FRANKLIN on CKD  Hypomagnesemia  Anemia      brief summary  Mr. Everton Larios is 54 year old year old man with a past medical history of hypothyroidism, GERD, BPH, depression, paroxysmal A-fib, NICM s/p OHT 2/24/2019.  Post operative course complicated by FRANKLIN, RV Dysfunction, HCAP and impaired activity tolerance and strength. Admitted to ARU 4/3/2019 for ongoing rehabilitation.    rehabilitation course  ADLs/IADLs: Pt is mod IND with FB dressing, toileting, bathing, and G/H tasks. Pt ordered RTS through [employer]. Pt is mod IND with simple meal prep with FWW. On track for discharge tomorrow with OP CR.      Mobility:  Up ind in room w/ ww.  On track to discharge home tomorrow w/ both walker and cane for household mobility, OP CR f/u.       mEDICAL COURSE  NICM s/p OHT 2/24/2019- with RV dysfunction.  Followed by hospitalist & cardiology during ARU stay.   Immunosuppresion: cellcept, tac (goal 8-10) dose increased 4/8, pred (dose 10 mg q am 5 mg q pm per discussion with cardiology 4/12)   Prophylaxis- nystatin, bactrim, valcyte   Continue crestor, asa   -continue bumex 2 mg bid- potassium to bid  -most recent biopsy 4/10- results 1 R mild focal acute cellular rejection with no AMR  -continue reg diet, and fluid restriction.   -sternal precautions- : 20 lbs  -f/u cv surgery/cardiology as scheduled.     FRANKLIN on CKD- felt to be ATN, short course of dialysis.  Cr. 1.18 4/12  -trend bmp f/u per cardiology     Hypomagnesemia- mag 1.4 4/8 on mag ox 400 mg bid, bumex reduced 4/8.  Mag 2.3 4/10.  -continue oral replacement.       Anemia- history of iron deficiency anemia.  Hgb stable 9.9 4/10      dISCHARGE  MEDICATIONS  Discharge Medication List as of 4/12/2019 10:45 AM      START taking these medications    Details   multivitamin w/minerals (THERA-VIT-M) tablet Take 1 tablet by mouth daily, OTC      tacrolimus (GENERIC EQUIVALENT) 0.5 MG capsule Take 3 mg every am, and 3.5 mg every pm-further dose titration per transplant team, Disp-30 capsule, R-0, E-Prescribe         CONTINUE these medications which have CHANGED    Details   bumetanide (BUMEX) 2 MG tablet Take 1 tablet (2 mg) by mouth 2 times daily, Disp-60 tablet, R-0, E-Prescribe      mycophenolate (GENERIC EQUIVALENT) 250 MG capsule Take 6 capsules (1,500 mg) by mouth 2 times daily, Disp-360 capsule, R-0, E-Prescribe      nystatin (MYCOSTATIN) 616571 UNIT/ML suspension Take 5 mLs (500,000 Units) by mouth 4 times dailyDisp-600 mL, [id number]      potassium chloride ER (K-DUR/KLOR-CON M) 20 MEQ CR tablet Take 2 tablets (40 mEq) by mouth 2 times daily, Disp-120 tablet, R-0, E-Prescribe      predniSONE (DELTASONE) 5 MG tablet Take 5 mg by mouth every evening Take 10 mg (2 tablets) every am, and 5 mg (1 tablet) every evening.., Disp-90 tablet, R-0, E-PrescribePlease include the quantity of tablets and (strength) per dose in sig.      rosuvastatin (CRESTOR) 10 MG tablet Take 1 tablet (10 mg) by mouth daily, Disp-30 tablet, R-0, E-Prescribe      sulfamethoxazole-trimethoprim (BACTRIM DS/SEPTRA DS) 800-160 MG tablet Take 1 tablet by mouth in the morning, Mon and Thur, Disp-10 tablet, R-0, E-Prescribe      tacrolimus (GENERIC EQUIVALENT) 1 MG capsule Take 3 capsules (3 mg) by mouth every morning Take (3.5 mg) by mouth every evening. 3 (1 mg caps) 1 capsule of (0.5 mg), Disp-180 capsule, R-0, E-Prescribe      valGANciclovir (VALCYTE) 450 MG tablet Take 2 tablets (900 mg) by mouth daily, Disp-60 tablet, R-0, E-Prescribe         CONTINUE these medications which have NOT CHANGED    Details   acetaminophen (TYLENOL) 325 MG tablet Take 2 tablets (650 mg) by mouth every 4  hours as needed for mild pain, Transitional      albuterol (PROAIR HFA/PROVENTIL HFA/VENTOLIN HFA) 108 (90 Base) MCG/ACT inhaler Inhale 2 puffs into the lungs every 6 hours as needed for shortness of breath / dyspnea or wheezing, Historical      aspirin (ASA) 81 MG chewable tablet Take 1 tablet (81 mg) by mouth daily, Transitional      azelastine (ASTELIN) 0.1 % nasal spray Spray 2 sprays into both nostrils 2 times daily as needed for rhinitis or allergies, Transitional      calcium carbonate (TUMS) 500 MG chewable tablet Take 1 tablet (500 mg) by mouth daily as needed for heartburn, Transitional      calcium carbonate 600 mg-vitamin D 400 units (CALTRATE) 600-400 MG-UNIT per tablet Take 1 tablet by mouth 2 times daily (with meals), Transitional      DULoxetine (CYMBALTA) 20 MG EC capsule Take 20 mg by mouth daily, Historical      fluticasone (FLOVENT HFA) 220 MCG/ACT Inhaler Inhale 2 puffs into the lungs 2 times daily, Historical      levothyroxine (SYNTHROID/LEVOTHROID) 100 MCG tablet Take 100 mcg by mouth daily , Historical      magnesium oxide (MAG-OX) 400 MG tablet Take 1 tablet (400 mg) by mouth daily, Transitional      melatonin 3 MG tablet Take 2 tablets (6 mg) by mouth At Bedtime, Transitional      mesalamine (ASACOL HD) 800 MG EC tablet Take 3 tablets (2,400 mg) by mouth 2 times daily, Historical      montelukast (SINGULAIR) 10 MG tablet Take 1 tablet (10 mg) by mouth At Bedtime, Historical      omeprazole (PRILOSEC) 40 MG DR capsule Take 1 capsule (40 mg) by mouth 2 times daily (before meals), Transitional      polyethylene glycol (MIRALAX/GLYCOLAX) packet Take 17 g by mouth daily as needed for constipation, Transitional      senna-docusate (SENOKOT-S/PERICOLACE) 8.6-50 MG tablet Take 1 tablet by mouth 2 times daily, Transitional         STOP taking these medications       Alpha-D-Galactosidase (BEANO PO) Comments:   Reason for Stopping:         alum & mag hydroxide-simethicone (MYLANTA ES/MAALOX  ES)  400-400-40 MG/5ML SUSP suspension Comments:   Reason for Stopping:         BETA BLOCKER NOT PRESCRIBED (INTENTIONAL) Comments:   Reason for Stopping:         Ergocalciferol (VITAMIN D2 PO) Comments:   Reason for Stopping:         multivitamin RENAL (NEPHROCAPS/TRIPHROCAPS) 1 MG capsule Comments:   Reason for Stopping:         oxyCODONE (ROXICODONE) 5 MG tablet Comments:   Reason for Stopping:         sulfamethoxazole-trimethoprim (BACTRIM/SEPTRA) 400-80 MG tablet Comments:   Reason for Stopping:                 DISCHARGE INSTRUCTIONS AND FOLLOW UP  Discharge Procedure Orders   CARDIAC REHAB REFERRAL   Standing Status: Future Standing Exp. Date: 04/11/20   Referral Type: Rehab Therapy Cardiac Therapy   Number of Visits Requested: 1     Reason for your hospital stay   Order Comments: Admitted for rehabilitation following heart transplant.     Activity   Order Comments: Your activity upon discharge: activity as tolerated, with ongoing cardiac rehab.     Order Specific Question Answer Comments   Is discharge order? Yes      Monitor and record   Order Comments: weight every day- notify transplant coordinator if weight change greater than 2 pounds in 1 day or 5 pounds in one week.     Full Code     Order Specific Question Answer Comments   Code status determined by: Discussion with patient/legal decision maker      Diet   Order Comments: Follow this diet upon discharge:  Recommend following- heart healthy diet- limiting saturated fat and sodium     Order Specific Question Answer Comments   Is discharge order? Yes           physical examination    Most recent Vital Signs:   Vitals:    04/11/19 0650 04/11/19 1625 04/12/19 0635 04/12/19 0831   BP: 147/87 128/84  112/72   BP Location: Right arm Right arm  Right arm   Pulse: 99 96  94   Resp: 16 16  16   Temp: 96.6  F (35.9  C) 97.7  F (36.5  C)  97.4  F (36.3  C)   TempSrc: Oral Oral  Oral   SpO2: 99% 100%  98%   Weight: 53 kg (116 lb 14.4 oz)  53.2 kg (117 lb 4.8 oz)     Height:       General: awake alert nad  Resp: non labored clear on room air  CV: rrr   Ab: soft non distended non tender  INcisions: cdi well approximated no drainage or redness  Extremities: warm dry without significant edema      40 minutes spent in discharge, including >50% in counseling and coordination of care, medication review and plan of care recommended on follow up.     It was our pleasure to care for Everton Larios during this hospitalization. Please do not hesitate to contact me should there be questions regarding the hospital course or discharge plan.          Ann Marie Torres PA-C  Physical Medicine and Rehabilitation   [phone]

## 2019-04-12 NOTE — PLAN OF CARE
FOCUS/GOAL  Bowel management, Bladder management, Pain management, Discharge planning and Cognition/Memory/Judgment/Problem solving    ASSESSMENT, INTERVENTIONS AND CONTINUING PLAN FOR GOAL:    A&O, Mod I FWW. Last BM 4/11/19, voiding in urinal staff empties. No reports of pain this shift.  Expected discharge 4/12/19.

## 2019-04-15 ENCOUNTER — TELEPHONE (OUTPATIENT)
Dept: TRANSPLANT | Facility: CLINIC | Age: 54
End: 2019-04-15

## 2019-04-15 ENCOUNTER — PRE VISIT (OUTPATIENT)
Dept: TRANSPLANT | Facility: CLINIC | Age: 54
End: 2019-04-15

## 2019-04-15 DIAGNOSIS — Z94.1 HEART REPLACED BY TRANSPLANT (H): Primary | ICD-10-CM

## 2019-04-15 RX ORDER — LIDOCAINE 40 MG/G
CREAM TOPICAL
Status: CANCELLED | OUTPATIENT
Start: 2019-04-15

## 2019-04-15 NOTE — PHARMACY-TRANSPLANT NOTE
Teaching pharmacist did not get to see this patient prior to leaving [facility] or [facility].  Have referred patient to [facility] pharmacist.  Not sure if patient has bp monitor or 7 day pill organizer.    Will inform transplant coordinator.     CATA Diaz.Ph.  [facility]  [phone] pager

## 2019-04-15 NOTE — TELEPHONE ENCOUNTER
LM for patient to call back to review discharge instructions and set up lab appt. for tomorrow. Due for labs: tacro, bmp and CMV.

## 2019-04-16 ENCOUNTER — TELEPHONE (OUTPATIENT)
Dept: TRANSPLANT | Facility: CLINIC | Age: 54
End: 2019-04-16

## 2019-04-16 DIAGNOSIS — Z94.1 HEART REPLACED BY TRANSPLANT (H): ICD-10-CM

## 2019-04-16 RX ORDER — MAGNESIUM OXIDE 400 MG/1
400 TABLET ORAL DAILY
Qty: 90 TABLET | Refills: 3 | Status: SHIPPED | OUTPATIENT
Start: 2019-04-16 | End: 2019-04-19

## 2019-04-16 NOTE — TELEPHONE ENCOUNTER
Called patient to review discharge summary and post transplant education.Patient discharged to parent's house in [facility]. Patient reports that he is feeling well and moving around better at home using cane, sometimes unsteady but feeling relativlely good. He has some dyspnea with activity/stairs d/t deconditioning and is working on setting up cardiac rehab.    Reinforced/reviewed:   -sternal precautions  -mask in public/mariaa places and when returning to clinic with construction zones.  -Tacrolimus 3/3.5, MMF 1500 bid, pred 10/5, bactrim, nystatin, valcyte, bumex, K+  -12-hour tacro level and  kits if outside lab  -upcoming appointments  -infection prevention    Patient due for labs this week: Tacro level, BMP, Mg, phos, CBC, CMV.    Mag-ox refill sent per patient request.     kits sent to patient for future lab draws if he chooses to go to closer local lab.     Txp Coordinator contact and On-call contact info reviewed with patient. Encouraged patient to call with further questions/concerns.

## 2019-04-17 ENCOUNTER — COMMUNICATION - HEALTHEAST (OUTPATIENT)
Dept: INTERNAL MEDICINE | Facility: CLINIC | Age: 54
End: 2019-04-17

## 2019-04-17 DIAGNOSIS — Z94.1 HEART REPLACED BY TRANSPLANT (H): ICD-10-CM

## 2019-04-17 LAB
ERYTHROCYTE [DISTWIDTH] IN BLOOD BY AUTOMATED COUNT: 17.9 % (ref 10–15)
HCT VFR BLD AUTO: 30.1 % (ref 40–53)
HGB BLD-MCNC: 9.2 G/DL (ref 13.3–17.7)
MCH RBC QN AUTO: 30.1 PG (ref 26.5–33)
MCHC RBC AUTO-ENTMCNC: 30.6 G/DL (ref 31.5–36.5)
MCV RBC AUTO: 98 FL (ref 78–100)
PLATELET # BLD AUTO: 187 10E9/L (ref 150–450)
RBC # BLD AUTO: 3.06 10E12/L (ref 4.4–5.9)
WBC # BLD AUTO: 5.2 10E9/L (ref 4–11)

## 2019-04-17 PROCEDURE — 80197 ASSAY OF TACROLIMUS: CPT | Performed by: INTERNAL MEDICINE

## 2019-04-17 PROCEDURE — 84100 ASSAY OF PHOSPHORUS: CPT | Performed by: INTERNAL MEDICINE

## 2019-04-17 PROCEDURE — 85027 COMPLETE CBC AUTOMATED: CPT | Performed by: INTERNAL MEDICINE

## 2019-04-17 PROCEDURE — 83735 ASSAY OF MAGNESIUM: CPT | Performed by: INTERNAL MEDICINE

## 2019-04-17 PROCEDURE — 80048 BASIC METABOLIC PNL TOTAL CA: CPT | Performed by: INTERNAL MEDICINE

## 2019-04-17 PROCEDURE — 36415 COLL VENOUS BLD VENIPUNCTURE: CPT | Performed by: INTERNAL MEDICINE

## 2019-04-18 ENCOUNTER — OFFICE VISIT - HEALTHEAST (OUTPATIENT)
Dept: INTERNAL MEDICINE | Facility: CLINIC | Age: 54
End: 2019-04-18

## 2019-04-18 ENCOUNTER — TELEPHONE (OUTPATIENT)
Dept: TRANSPLANT | Facility: CLINIC | Age: 54
End: 2019-04-18

## 2019-04-18 DIAGNOSIS — E03.9 HYPOTHYROIDISM, UNSPECIFIED TYPE: ICD-10-CM

## 2019-04-18 DIAGNOSIS — R93.89 ABNORMAL CHEST CT: ICD-10-CM

## 2019-04-18 DIAGNOSIS — R53.1 WEAKNESS: ICD-10-CM

## 2019-04-18 DIAGNOSIS — Z94.1 HEART REPLACED BY TRANSPLANT (H): ICD-10-CM

## 2019-04-18 DIAGNOSIS — K51.90 ULCERATIVE COLITIS WITHOUT COMPLICATIONS, UNSPECIFIED LOCATION (H): ICD-10-CM

## 2019-04-18 DIAGNOSIS — J45.30 MILD PERSISTENT ASTHMA WITHOUT COMPLICATION: ICD-10-CM

## 2019-04-18 DIAGNOSIS — K22.70 BARRETT'S ESOPHAGUS WITHOUT DYSPLASIA: ICD-10-CM

## 2019-04-18 LAB
ANION GAP SERPL CALCULATED.3IONS-SCNC: 7 MMOL/L (ref 3–14)
BUN SERPL-MCNC: 51 MG/DL (ref 7–30)
CALCIUM SERPL-MCNC: 8.4 MG/DL (ref 8.5–10.1)
CHLORIDE SERPL-SCNC: 109 MMOL/L (ref 94–109)
CMV DNA SPEC NAA+PROBE-ACNC: NORMAL [IU]/ML
CMV DNA SPEC NAA+PROBE-LOG#: NORMAL {LOG_IU}/ML
CO2 SERPL-SCNC: 26 MMOL/L (ref 20–32)
CREAT SERPL-MCNC: 1.11 MG/DL (ref 0.66–1.25)
GFR SERPL CREATININE-BSD FRML MDRD: 74 ML/MIN/{1.73_M2}
GLUCOSE SERPL-MCNC: 122 MG/DL (ref 70–99)
MAGNESIUM SERPL-MCNC: 1.3 MG/DL (ref 1.6–2.3)
PHOSPHATE SERPL-MCNC: 2.3 MG/DL (ref 2.5–4.5)
POTASSIUM SERPL-SCNC: 4.1 MMOL/L (ref 3.4–5.3)
SODIUM SERPL-SCNC: 142 MMOL/L (ref 133–144)
SPECIMEN SOURCE: NORMAL
TACROLIMUS BLD-MCNC: 6.8 UG/L (ref 5–15)
TME LAST DOSE: 2100 H

## 2019-04-18 NOTE — TELEPHONE ENCOUNTER
LM asking pt to call back to discuss results.  Looks like his FK draw was on a 14-hour trough, so not sure if the 6.8 is accurate. Goal 10-12.

## 2019-04-19 ENCOUNTER — HOSPITAL ENCOUNTER (OUTPATIENT)
Facility: CLINIC | Age: 54
Discharge: HOME | End: 2019-04-19
Attending: INTERNAL MEDICINE | Admitting: INTERNAL MEDICINE
Payer: COMMERCIAL

## 2019-04-19 ENCOUNTER — TELEPHONE (OUTPATIENT)
Dept: CARDIOLOGY | Facility: CLINIC | Age: 54
End: 2019-04-19

## 2019-04-19 ENCOUNTER — TELEPHONE (OUTPATIENT)
Dept: TRANSPLANT | Facility: CLINIC | Age: 54
End: 2019-04-19

## 2019-04-19 DIAGNOSIS — Z94.1 HEART REPLACED BY TRANSPLANT (H): ICD-10-CM

## 2019-04-19 DIAGNOSIS — Z94.1 HEART REPLACED BY TRANSPLANT (H): Primary | ICD-10-CM

## 2019-04-19 RX ORDER — TACROLIMUS 0.5 MG/1
CAPSULE ORAL
Qty: 60 CAPSULE | Refills: 11 | Status: SHIPPED | OUTPATIENT
Start: 2019-04-19 | End: 2019-04-19

## 2019-04-19 RX ORDER — TACROLIMUS 1 MG/1
CAPSULE ORAL
Qty: 180 CAPSULE | Refills: 11 | Status: SHIPPED | OUTPATIENT
Start: 2019-04-19 | End: 2019-04-24

## 2019-04-19 RX ORDER — MAGNESIUM OXIDE 400 MG/1
400 TABLET ORAL 2 TIMES DAILY
Qty: 180 TABLET | Refills: 3 | Status: ON HOLD | OUTPATIENT
Start: 2019-04-19 | End: 2021-03-17

## 2019-04-19 RX ORDER — TACROLIMUS 0.5 MG/1
CAPSULE ORAL
Qty: 60 CAPSULE | Refills: 11 | Status: SHIPPED | OUTPATIENT
Start: 2019-04-19 | End: 2019-04-24

## 2019-04-19 NOTE — TELEPHONE ENCOUNTER
Health Call Center    Phone Message    May a detailed message be left on voicemail: yes    Reason for Call: Medication Question or concern regarding medication   Prescription Clarification  Name of Medication: tacrolimus (GENERIC EQUIVALENT) 0.5 MG & 1MG    Prescribing Provider: Dr. Donis   Pharmacy: [facility]   What on the order needs clarification? Directions are different for 0.5mg and the 1mg.  There is a discrepancy in the am (morning) dosage.  Please call to clarify instructions.          Action Taken: Message routed to:  [facility] ([facility] cardiology

## 2019-04-22 ENCOUNTER — TELEPHONE (OUTPATIENT)
Dept: TRANSPLANT | Facility: CLINIC | Age: 54
End: 2019-04-22

## 2019-04-22 ENCOUNTER — COMMUNICATION - HEALTHEAST (OUTPATIENT)
Dept: ANTICOAGULATION | Facility: CLINIC | Age: 54
End: 2019-04-22

## 2019-04-22 DIAGNOSIS — Q21.10 ASD (ATRIAL SEPTAL DEFECT): ICD-10-CM

## 2019-04-22 NOTE — TELEPHONE ENCOUNTER
Patient Call: General  Route to LPN    Reason for call: Patient called to inform coordinator that he was knocked to the ground by his brothers dog and has a L1 compression fracture. He stated his heart was not affected by the fall according to the ED  and [facility]. Patient states he does not need a call back unless there are questions.    Call back needed? No

## 2019-04-23 ENCOUNTER — AMBULATORY - HEALTHEAST (OUTPATIENT)
Dept: CARDIAC REHAB | Facility: HOSPITAL | Age: 54
End: 2019-04-23

## 2019-04-23 DIAGNOSIS — Z94.1 STATUS POST HEART TRANSPLANTATION (H): ICD-10-CM

## 2019-04-23 NOTE — TELEPHONE ENCOUNTER
LM asking how pt is doing after his back injury and for him to call and let us know what the treatment plan is.

## 2019-04-24 ENCOUNTER — HOSPITAL ENCOUNTER (OUTPATIENT)
Facility: CLINIC | Age: 54
Discharge: HOME | End: 2019-04-24
Attending: INTERNAL MEDICINE | Admitting: INTERNAL MEDICINE
Payer: COMMERCIAL

## 2019-04-24 ENCOUNTER — APPOINTMENT (OUTPATIENT)
Dept: MEDSURG UNIT | Facility: CLINIC | Age: 54
End: 2019-04-24
Attending: INTERNAL MEDICINE
Payer: COMMERCIAL

## 2019-04-24 ENCOUNTER — TELEPHONE (OUTPATIENT)
Dept: TRANSPLANT | Facility: CLINIC | Age: 54
End: 2019-04-24

## 2019-04-24 VITALS
HEART RATE: 101 BPM | OXYGEN SATURATION: 98 % | RESPIRATION RATE: 16 BRPM | SYSTOLIC BLOOD PRESSURE: 145 MMHG | TEMPERATURE: 97.8 F | DIASTOLIC BLOOD PRESSURE: 91 MMHG

## 2019-04-24 DIAGNOSIS — Z94.1 HEART REPLACED BY TRANSPLANT (H): ICD-10-CM

## 2019-04-24 DIAGNOSIS — Z94.1 HEART REPLACED BY TRANSPLANT (H): Primary | ICD-10-CM

## 2019-04-24 LAB
ANION GAP SERPL CALCULATED.3IONS-SCNC: 11 MMOL/L (ref 3–14)
ANISOCYTOSIS BLD QL SMEAR: SLIGHT
BASOPHILS # BLD AUTO: 0 10E9/L (ref 0–0.2)
BASOPHILS NFR BLD AUTO: 3 %
BUN SERPL-MCNC: 51 MG/DL (ref 7–30)
BURR CELLS BLD QL SMEAR: SLIGHT
CALCIUM SERPL-MCNC: 8.7 MG/DL (ref 8.5–10.1)
CHLORIDE SERPL-SCNC: 105 MMOL/L (ref 94–109)
CO2 SERPL-SCNC: 20 MMOL/L (ref 20–32)
CREAT SERPL-MCNC: 1.7 MG/DL (ref 0.66–1.25)
DIFFERENTIAL METHOD BLD: ABNORMAL
EOSINOPHIL # BLD AUTO: 0.1 10E9/L (ref 0–0.7)
EOSINOPHIL NFR BLD AUTO: 6 %
ERYTHROCYTE [DISTWIDTH] IN BLOOD BY AUTOMATED COUNT: 17.2 % (ref 10–15)
GFR SERPL CREATININE-BSD FRML MDRD: 44 ML/MIN/{1.73_M2}
GLUCOSE SERPL-MCNC: 83 MG/DL (ref 70–99)
HCT VFR BLD AUTO: 33.2 % (ref 40–53)
HGB BLD-MCNC: 9.9 G/DL (ref 13.3–17.7)
LYMPHOCYTES # BLD AUTO: 0.4 10E9/L (ref 0.8–5.3)
LYMPHOCYTES NFR BLD AUTO: 33 %
MAGNESIUM SERPL-MCNC: 1.5 MG/DL (ref 1.6–2.3)
MCH RBC QN AUTO: 30.6 PG (ref 26.5–33)
MCHC RBC AUTO-ENTMCNC: 29.8 G/DL (ref 31.5–36.5)
MCV RBC AUTO: 103 FL (ref 78–100)
MONOCYTES # BLD AUTO: 0 10E9/L (ref 0–1.3)
MONOCYTES NFR BLD AUTO: 1 %
MYELOCYTES # BLD: 0 10E9/L
MYELOCYTES NFR BLD MANUAL: 2 %
NEUTROPHILS # BLD AUTO: 0.6 10E9/L (ref 1.6–8.3)
NEUTROPHILS NFR BLD AUTO: 55 %
OVALOCYTES BLD QL SMEAR: SLIGHT
PHOSPHATE SERPL-MCNC: 4.1 MG/DL (ref 2.5–4.5)
PLATELET # BLD AUTO: 196 10E9/L (ref 150–450)
PLATELET # BLD EST: ABNORMAL 10*3/UL
POIKILOCYTOSIS BLD QL SMEAR: SLIGHT
POTASSIUM SERPL-SCNC: 5.2 MMOL/L (ref 3.4–5.3)
RBC # BLD AUTO: 3.24 10E12/L (ref 4.4–5.9)
SODIUM SERPL-SCNC: 137 MMOL/L (ref 133–144)
TACROLIMUS BLD-MCNC: 11.4 UG/L (ref 5–15)
TME LAST DOSE: NORMAL H
WBC # BLD AUTO: 1.1 10E9/L (ref 4–11)

## 2019-04-24 PROCEDURE — 88350 IMFLUOR EA ADDL 1ANTB STN PX: CPT | Performed by: INTERNAL MEDICINE

## 2019-04-24 PROCEDURE — 84100 ASSAY OF PHOSPHORUS: CPT | Performed by: NURSE PRACTITIONER

## 2019-04-24 PROCEDURE — 80048 BASIC METABOLIC PNL TOTAL CA: CPT | Performed by: NURSE PRACTITIONER

## 2019-04-24 PROCEDURE — 85027 COMPLETE CBC AUTOMATED: CPT | Performed by: NURSE PRACTITIONER

## 2019-04-24 PROCEDURE — 40000264 ZZHC STATISTIC IV PUSH SINGLE INITIAL SUBSTANCE

## 2019-04-24 PROCEDURE — 88346 IMFLUOR 1ST 1ANTB STAIN PX: CPT | Performed by: INTERNAL MEDICINE

## 2019-04-24 PROCEDURE — 25000125 ZZHC RX 250: Performed by: NURSE PRACTITIONER

## 2019-04-24 PROCEDURE — 83735 ASSAY OF MAGNESIUM: CPT | Performed by: NURSE PRACTITIONER

## 2019-04-24 PROCEDURE — 25000132 ZZH RX MED GY IP 250 OP 250 PS 637: Performed by: PHYSICIAN ASSISTANT

## 2019-04-24 PROCEDURE — 96365 THER/PROPH/DIAG IV INF INIT: CPT

## 2019-04-24 PROCEDURE — 27210794 ZZH OR GENERAL SUPPLY STERILE: Performed by: INTERNAL MEDICINE

## 2019-04-24 PROCEDURE — 93505 ENDOMYOCARDIAL BIOPSY: CPT | Mod: 26 | Performed by: INTERNAL MEDICINE

## 2019-04-24 PROCEDURE — 85004 AUTOMATED DIFF WBC COUNT: CPT | Performed by: NURSE PRACTITIONER

## 2019-04-24 PROCEDURE — 88307 TISSUE EXAM BY PATHOLOGIST: CPT | Performed by: INTERNAL MEDICINE

## 2019-04-24 PROCEDURE — C1894 INTRO/SHEATH, NON-LASER: HCPCS | Performed by: INTERNAL MEDICINE

## 2019-04-24 PROCEDURE — 25000125 ZZHC RX 250: Performed by: INTERNAL MEDICINE

## 2019-04-24 PROCEDURE — 93505 ENDOMYOCARDIAL BIOPSY: CPT | Performed by: INTERNAL MEDICINE

## 2019-04-24 PROCEDURE — 80197 ASSAY OF TACROLIMUS: CPT | Performed by: NURSE PRACTITIONER

## 2019-04-24 PROCEDURE — 36415 COLL VENOUS BLD VENIPUNCTURE: CPT | Performed by: NURSE PRACTITIONER

## 2019-04-24 PROCEDURE — 40000166 ZZH STATISTIC PP CARE STAGE 1

## 2019-04-24 PROCEDURE — 25000128 H RX IP 250 OP 636: Performed by: STUDENT IN AN ORGANIZED HEALTH CARE EDUCATION/TRAINING PROGRAM

## 2019-04-24 RX ORDER — MAGNESIUM SULFATE HEPTAHYDRATE 40 MG/ML
4 INJECTION, SOLUTION INTRAVENOUS ONCE
Status: COMPLETED | OUTPATIENT
Start: 2019-04-24 | End: 2019-04-24

## 2019-04-24 RX ORDER — MYCOPHENOLATE MOFETIL 250 MG/1
1000 CAPSULE ORAL 2 TIMES DAILY
Qty: 240 CAPSULE | Refills: 11 | Status: SHIPPED | OUTPATIENT
Start: 2019-04-24 | End: 2019-04-26

## 2019-04-24 RX ORDER — MAGNESIUM SULFATE 1 G/100ML
4 INJECTION INTRAVENOUS ONCE
Status: DISCONTINUED | OUTPATIENT
Start: 2019-04-24 | End: 2019-04-24

## 2019-04-24 RX ORDER — LIDOCAINE 40 MG/G
CREAM TOPICAL
Status: COMPLETED | OUTPATIENT
Start: 2019-04-24 | End: 2019-04-24

## 2019-04-24 RX ORDER — CALCIUM CARBONATE 500 MG/1
500 TABLET, CHEWABLE ORAL DAILY PRN
Status: DISCONTINUED | OUTPATIENT
Start: 2019-04-24 | End: 2019-04-24 | Stop reason: HOSPADM

## 2019-04-24 RX ORDER — TACROLIMUS 0.5 MG/1
CAPSULE ORAL
Qty: 30 CAPSULE | Refills: 11 | Status: SHIPPED | OUTPATIENT
Start: 2019-04-24 | End: 2019-04-26

## 2019-04-24 RX ORDER — TACROLIMUS 1 MG/1
CAPSULE ORAL
Qty: 180 CAPSULE | Refills: 11 | Status: SHIPPED | OUTPATIENT
Start: 2019-04-24 | End: 2019-04-26

## 2019-04-24 RX ADMIN — LIDOCAINE: 40 CREAM TOPICAL at 09:54

## 2019-04-24 RX ADMIN — MAGNESIUM SULFATE HEPTAHYDRATE 4 G: 40 INJECTION, SOLUTION INTRAVENOUS at 11:08

## 2019-04-24 RX ADMIN — CALCIUM CARBONATE (ANTACID) CHEW TAB 500 MG 500 MG: 500 CHEW TAB at 10:08

## 2019-04-24 NOTE — IP AVS SNAPSHOT
[facility], [facility],  Heart Cath Lab  [address] [facility] [address]  Phone:  [phone]                                    After Visit Summary   4/24/2019    Everton Larios    MRN: [id number]           After Visit Summary Signature Page    I have received my discharge instructions, and my questions have been answered. I have discussed any challenges I see with this plan with the nurse or doctor.    ..........................................................................................................................................  Patient/Patient Representative Signature      ..........................................................................................................................................  Patient Representative Print Name and Relationship to Patient    ..................................................               ................................................  Date                                   Time    ..........................................................................................................................................  Reviewed by Signature/Title    ...................................................              ..............................................  Date                                               Time          22EPIC Rev 08/18

## 2019-04-24 NOTE — PROGRESS NOTES
D: Pt arrived in cath lab for right heart catheterization and endomyocardial tissue biopsy  I: Right heart catheterization and endomyocardial biopsy completed per MD.  7fr sheath removed from RIJ site, manual pressure applied until hemostasis achieved.  A: RIJ site clean, dry and intact. Soft, no hematoma.  P: Pt to transfer to  for discharge.  Pt educated on watching neck site for bleeding, hematoma, pressure on airway and voice changes.

## 2019-04-24 NOTE — PROGRESS NOTES
Returned to 2A.  BP (!) 149/92   Temp 97.6  F (36.4  C) (Oral)   Resp 18   SpO2 98%   R internal jugular site cdi.  No bleeding no hematoma.  Pt eating walking and voiding at baseline.

## 2019-04-24 NOTE — PROGRESS NOTES
Discharge instruction went over with and given to pt, all questions answered.  Rt neck site remains F/D/I.  Magnesium infusion completed.  Pt did not want anything to eat, tolerating po fluids well.  1325-Pt tolerated ambulation in the room and hallway.  PIV D/C'ed, catheter intact. 1335-Pt D/C'ed to home with his family.

## 2019-04-24 NOTE — TELEPHONE ENCOUNTER
Reviewed labs with pt. Confirmed 12-hour trough on FK.   Level 11.4; goal 8-10. Dose decreased from 3.5 mg BID to 3.5/3 mg.  WBC 1.1 - MMF hold tonight's dose. Resume at 1000 mg BID  Tomorrow 4/25, take 2 mg Bumex TID  Recheck labs prior appt on Friday    Confirmed med changes with pt. NOTE  Pt does all meds himself. Mom does not assist.     Pt reoprts he is very tired tonight after the busy day today. N/V after taking meds on empty stomach. Resting, Will try taking meds later.

## 2019-04-24 NOTE — IP AVS SNAPSHOT
MRN:[id number]                      After Visit Summary   4/24/2019    Everton Larios    MRN: [id number]           Visit Information        Department      4/24/2019  8:42 AM [facility], Sam,  Heart [facility] Lab          Review of your medicines      UNREVIEWED medicines. Ask your doctor about these medicines       Dose / Directions   acetaminophen 325 MG tablet  Commonly known as:  TYLENOL  Used for:  Heart replaced by transplant (H)      Dose:  650 mg  Take 2 tablets (650 mg) by mouth every 4 hours as needed for mild pain  Refills:  0     albuterol 108 (90 Base) MCG/ACT inhaler  Commonly known as:  PROAIR HFA/PROVENTIL HFA/VENTOLIN HFA  Used for:  Intermittent asthma without complication, unspecified asthma severity      Dose:  2 puff  Inhale 2 puffs into the lungs every 6 hours as needed for shortness of breath / dyspnea or wheezing  Refills:  0     ASACOL  MG EC tablet  Used for:  Ulcerative colitis with complication, unspecified location (H)  Generic drug:  mesalamine      Dose:  2400 mg  Take 3 tablets (2,400 mg) by mouth 2 times daily  Refills:  0     aspirin 81 MG chewable tablet  Commonly known as:  ASA  Used for:  S/P orthotopic heart transplant (H)      Dose:  81 mg  Take 1 tablet (81 mg) by mouth daily  Refills:  0     azelastine 0.1 % nasal spray  Commonly known as:  ASTELIN  Used for:  Heart replaced by transplant (H)      Dose:  2 spray  Spray 2 sprays into both nostrils 2 times daily as needed for rhinitis or allergies  Refills:  0     bumetanide 2 MG tablet  Commonly known as:  BUMEX  Used for:  Heart replaced by transplant (H)      Dose:  2 mg  Take 1 tablet (2 mg) by mouth 2 times daily  Quantity:  60 tablet  Refills:  0     calcium carbonate 500 MG chewable tablet  Commonly known as:  TUMS  Used for:  Heart replaced by transplant (H)      Dose:  1 chew tab  Take 1 tablet (500 mg) by mouth daily as needed for heartburn  Refills:  0     calcium carbonate 600 mg-vitamin D 400  units 600-400 MG-UNIT per tablet  Commonly known as:  CALTRATE  Used for:  Heart replaced by transplant (H)      Dose:  1 tablet  Take 1 tablet by mouth 2 times daily (with meals)  Refills:  0     DULoxetine 20 MG capsule  Commonly known as:  CYMBALTA      Dose:  20 mg  Take 20 mg by mouth daily  Refills:  0     fluticasone 220 MCG/ACT inhaler  Commonly known as:  FLOVENT HFA      Dose:  2 puff  Inhale 2 puffs into the lungs 2 times daily  Refills:  0     levothyroxine 100 MCG tablet  Commonly known as:  SYNTHROID/LEVOTHROID      Dose:  100 mcg  Take 100 mcg by mouth daily  Refills:  0     magnesium oxide 400 MG tablet  Commonly known as:  MAG-OX  Used for:  Heart replaced by transplant (H)      Dose:  400 mg  Take 1 tablet (400 mg) by mouth 2 times daily  Quantity:  180 tablet  Refills:  3     melatonin 3 MG tablet  Used for:  Heart replaced by transplant (H)      Dose:  6 mg  Take 2 tablets (6 mg) by mouth At Bedtime  Refills:  0     multivitamin w/minerals tablet  Used for:  Heart replaced by transplant (H)      Dose:  1 tablet  Take 1 tablet by mouth daily  Refills:  0     mycophenolate 250 MG capsule  Commonly known as:  GENERIC EQUIVALENT  Used for:  Heart replaced by transplant (H)      Dose:  1500 mg  Take 6 capsules (1,500 mg) by mouth 2 times daily  Quantity:  360 capsule  Refills:  0     nystatin 851194 UNIT/ML suspension  Commonly known as:  MYCOSTATIN  Indication:  Candidiasis Fungal Infection of the Oropharynx  Used for:  Heart replaced by transplant (H)      Dose:  815929 Units  Take 5 mLs (500,000 Units) by mouth 4 times daily  Quantity:  600 mL  Refills:  0     omeprazole 40 MG DR capsule  Commonly known as:  priLOSEC  Used for:  Nausea      Dose:  40 mg  Take 1 capsule (40 mg) by mouth 2 times daily (before meals)  Refills:  0     polyethylene glycol packet  Commonly known as:  MIRALAX/GLYCOLAX  Used for:  Heart replaced by transplant (H)      Dose:  17 g  Take 17 g by mouth daily as needed for  constipation  Refills:  0     potassium chloride ER 20 MEQ CR tablet  Commonly known as:  K-DUR/KLOR-CON M  Used for:  Heart replaced by transplant (H)      Dose:  40 mEq  Take 2 tablets (40 mEq) by mouth 2 times daily  Quantity:  120 tablet  Refills:  0     predniSONE 5 MG tablet  Commonly known as:  DELTASONE  Used for:  S/P orthotopic heart transplant (H)      Dose:  5 mg  Take 5 mg by mouth every evening Take 10 mg (2 tablets) every am, and 5 mg (1 tablet) every evening..  Quantity:  90 tablet  Refills:  0     rosuvastatin 10 MG tablet  Commonly known as:  CRESTOR  Used for:  Heart replaced by transplant (H)      Dose:  10 mg  Take 1 tablet (10 mg) by mouth daily  Quantity:  30 tablet  Refills:  0     senna-docusate 8.6-50 MG tablet  Commonly known as:  SENOKOT-S/PERICOLACE  Used for:  Heart replaced by transplant (H)      Dose:  1 tablet  Take 1 tablet by mouth 2 times daily  Refills:  0     SINGULAIR 10 MG tablet  Used for:  Intermittent asthma without complication, unspecified asthma severity  Generic drug:  montelukast      Dose:  10 mg  Take 1 tablet (10 mg) by mouth At Bedtime  Refills:  0     sulfamethoxazole-trimethoprim 800-160 MG tablet  Commonly known as:  BACTRIM DS/SEPTRA DS  Indication:  Preventative Medication Therapy Used Around Surgery  Used for:  Heart replaced by transplant (H)      Dose:  1 tablet  Take 1 tablet by mouth in the morning, Mon and Thur  Quantity:  10 tablet  Refills:  0     * tacrolimus 1 MG capsule  Commonly known as:  GENERIC EQUIVALENT  Used for:  Heart replaced by transplant (H)      Take (3.5 mg) by mouth every AM and PM. THREE (1 mg caps) ONE capsule of (0.5 mg)  Quantity:  180 capsule  Refills:  11     * tacrolimus 0.5 MG capsule  Commonly known as:  GENERIC EQUIVALENT  Used for:  Heart replaced by transplant (H)      Take ONE capsule with THREE 1 mg capsules (3.5 mg) twice daily.  Quantity:  60 capsule  Refills:  11     valGANciclovir 450 MG tablet  Commonly known as:   VALCYTE  Indication:  Medication Treatment to Prevent Cytomegalovirus Disease  Used for:  Heart replaced by transplant (H)      Dose:  900 mg  Take 2 tablets (900 mg) by mouth daily  Quantity:  60 tablet  Refills:  0         * This list has 2 medication(s) that are the same as other medications prescribed for you. Read the directions carefully, and ask your doctor or other care provider to review them with you.                  Protect others around you: Learn how to safely use, store and throw away your medicines at www.disposemymeds.org.       Follow-ups after your visit       Your next 10 appointments already scheduled    Apr 26, 2019  9:00 AM CDT  (Arrive by 8:45 AM)  RETURN HEART TRANSPLANT with Elin Jensen NP  [facility] ([facility]) [address] [place] [address]  [address]  [facility] [address] [phone]      Apr 26, 2019 10:30 AM CDT  (Arrive by 10:15 AM)  Office Visit with Franko Preciado [facility] Medication Therapy Management ([facility]) [address] [place] [address]  3rd Floor  [facility] [address] [phone]   Bring a current list of meds and any records pertaining to this visit.  For Physicals, please bring immunization records and any forms needing to be filled out. Please arrive 10 minutes early to complete paperwork.     May 08, 2019  8:00 AM CDT  Lab with  LAB  [facility] ([facility]) [address] [place] [address]  1st Floor  [facility] [address] [phone]      May 08, 2019  8:30 AM CDT  (Arrive by 8:15 AM)  RETURN HEART TRANSPLANT with Corinna Donis MD  [facility] ([facility]) [address] [place] [address]  [address] [place] [id number] [phone]      May 08, 2019  9:30 AM CDT  Procedure - 2.5 hour with U2A ROOM 4  Unit [address] [facility] ([facility]) [address] [facility] [address]      May 08,  2019  Procedure with Isiah Mcallister MD  [facility]  Heart Cath Lab ([facility]) [address] [address]  [phone]   The [facility] is located on the corner of [facility] [address] [facility] on the [facility]. It is easily accessible from virtually any point in the [facility] area, via [address] and [address].   May 22, 2019  9:00 AM CDT  LAB with UU LAB GOLD WAITING  [facility] ([facility]) [address] [facility] [address]   Please do not eat 10-12 hours before your appointment if you are coming in fasting for labs on lipids, cholesterol, or glucose (sugar). Does not apply to pregnant women. Water, tea and black coffee (with nothing added) is okay. Do not drink other fluids, diet soda or gum. If you have concerns about taking your medications, please send a message by clicking on Secure Messaging, Message Your Care Team.     May 22, 2019  9:30 AM CDT  Echo Complete with UUECHR2  [facility] ([facility]) [address] [facility] [address]  [phone]   1. Please bring or wear a comfortable two-piece outfit.  2. You may eat, drink and take your normal medicines.  3. For any questions that cannot be answered, please contact the ordering physician     May 22, 2019 11:00 AM CDT  Procedure - 2.5 hour with U2A ROOM 3  Unit 2A [facility] ([facility]) [address] [address]      May 22, 2019  Procedure with Yves Rodrigues MD  [facility],  Heart Cath Lab ([facility]) [address] [address]  [phone]   The [facility] is located on the corner of [facility] on the [facility]. It is easily  accessible from virtually any point in the [facility] area, via [address] and [address].      Care Instructions       Further instructions from your care team       [facility]                        Interventional Cardiology  Discharge Instructions   Post Right Heart Cath and Biopsy      AFTER YOU GO HOME:    DO drink plenty of fluids    DO resume your regular diet and medications unless otherwise instructed by your Primary Physician    Do Not scrub the procedure site vigorously    No lotion or powder to the puncture site for 3 days    CALL YOUR PRIMARY PHYSICIAN IF: You may resume all normal activity.  Monitor neck site for bleeding, swelling, or voice changes. If you notice bleeding or swelling immediately apply pressure to the site and call number below to speak with Cardiology Fellow.  If you experience any changes in your breathing you should call your doctor immediately or come to the closest Emergency Department.  Do not drive yourself.    ADDITIONAL INSTRUCTIONS: Medications: You are to resume all home medications including anticoagulation therapy unless otherwise advised by your primary cardiologist or nurse coordinator.    Follow Up: Per your primary cardiology team    If you have any questions or concerns regarding your procedure site please call [phone] at anytime and ask for Cardiology Fellow on call.  They are available 24 hours a day.  You may also contact the Cardiology Clinic after hours number at [phone].                                                       Telephone Numbers [phone] Monday-Friday 8:00 am to 4:30 pm    [phone] After 4:30 pm Monday-Friday, Weekends & Holidays  Ask for Interventional Cardiologist on call. Someone is on call 24 hours/day   [facility] toll free number [phone] Monday-Friday 8:00 am to 4:30 pm   [facility] Emergency Dept [phone]                   Additional Information About Your Visit       [employer] Information    [employer] gives you  secure access to your electronic health record. If you see a primary care provider, you can also send messages to your care team and make appointments. If you have questions, please call your primary care clinic.  If you do not have a primary care provider, please call [phone] and they will assist you.       Care EveryWhere ID    This is your Care EveryWhere ID. This could be used by other organizations to access your [facility] medical records  [id number]       Your Vitals Were     Blood Pressure   149/92      Temperature   97.6  F (36.4  C) (Oral)    Respirations   18    Pulse Oximetry   98%           Primary Care Provider Office Phone # Fax #    Fredrick J New [phone]      Equal Access to Services    BELIA GOLDBERG : Merari Morton, wasushil prateradaha, qaybta kaalmada alfredo, dean rayo . So [facility] [phone].    ATENCIÓN: Si habla español, tiene a anaya disposición servicios gratuitos de asistencia lingüística. Llame al [phone].    We comply with applicable federal civil rights laws and [place] laws. We do not discriminate on the basis of race, color, national origin, age, disability, sex, sexual orientation, or gender identity.           Thank you!    Thank you for choosing [facility] for your care. Our goal is always to provide you with excellent care. Hearing back from our patients is one way we can continue to improve our services. Please take a few minutes to complete the written survey that you may receive in the mail after you visit with us. Thank you!            Medication List      ASK your doctor about these medications          Morning Afternoon Evening Bedtime As Needed    acetaminophen 325 MG tablet  Also known as:  TYLENOL  INSTRUCTIONS:  Take 2 tablets (650 mg) by mouth every 4 hours as needed for mild pain                     albuterol 108 (90 Base) MCG/ACT inhaler  Also known as:  PROAIR HFA/PROVENTIL HFA/VENTOLIN  HFA  INSTRUCTIONS:  Inhale 2 puffs into the lungs every 6 hours as needed for shortness of breath / dyspnea or wheezing                     ASACOL  MG EC tablet  INSTRUCTIONS:  Take 3 tablets (2,400 mg) by mouth 2 times daily  Generic drug:  mesalamine                     aspirin 81 MG chewable tablet  Also known as:  ASA  INSTRUCTIONS:  Take 1 tablet (81 mg) by mouth daily                     azelastine 0.1 % nasal spray  Also known as:  ASTELIN  INSTRUCTIONS:  Spray 2 sprays into both nostrils 2 times daily as needed for rhinitis or allergies                     bumetanide 2 MG tablet  Also known as:  BUMEX  INSTRUCTIONS:  Take 1 tablet (2 mg) by mouth 2 times daily                     calcium carbonate 500 MG chewable tablet  Also known as:  TUMS  INSTRUCTIONS:  Take 1 tablet (500 mg) by mouth daily as needed for heartburn  LAST TAKEN:  500 mg on 4/24/2019 10:08 AM                     calcium carbonate 600 mg-vitamin D 400 units 600-400 MG-UNIT per tablet  Also known as:  CALTRATE  INSTRUCTIONS:  Take 1 tablet by mouth 2 times daily (with meals)                     DULoxetine 20 MG capsule  Also known as:  CYMBALTA  INSTRUCTIONS:  Take 20 mg by mouth daily                     fluticasone 220 MCG/ACT inhaler  Also known as:  FLOVENT HFA  INSTRUCTIONS:  Inhale 2 puffs into the lungs 2 times daily                     levothyroxine 100 MCG tablet  Also known as:  SYNTHROID/LEVOTHROID  INSTRUCTIONS:  Take 100 mcg by mouth daily                     magnesium oxide 400 MG tablet  Also known as:  MAG-OX  INSTRUCTIONS:  Take 1 tablet (400 mg) by mouth 2 times daily  Doctor's comments:  Increase in dose  LAST TAKEN:  Ask your nurse or doctor                     melatonin 3 MG tablet  INSTRUCTIONS:  Take 2 tablets (6 mg) by mouth At Bedtime                     multivitamin w/minerals tablet  INSTRUCTIONS:  Take 1 tablet by mouth daily                     mycophenolate 250 MG capsule  Also known as:  GENERIC  EQUIVALENT  INSTRUCTIONS:  Take 6 capsules (1,500 mg) by mouth 2 times daily                     nystatin 225327 UNIT/ML suspension  Also known as:  MYCOSTATIN  INSTRUCTIONS:  Take 5 mLs (500,000 Units) by mouth 4 times daily  Reason for med:  Candidiasis Fungal Infection of the Oropharynx                     omeprazole 40 MG DR capsule  Also known as:  priLOSEC  INSTRUCTIONS:  Take 1 capsule (40 mg) by mouth 2 times daily (before meals)                     polyethylene glycol packet  Also known as:  MIRALAX/GLYCOLAX  INSTRUCTIONS:  Take 17 g by mouth daily as needed for constipation                     potassium chloride ER 20 MEQ CR tablet  Also known as:  K-DUR/KLOR-CON M  INSTRUCTIONS:  Take 2 tablets (40 mEq) by mouth 2 times daily                     predniSONE 5 MG tablet  Also known as:  DELTASONE  INSTRUCTIONS:  Take 5 mg by mouth every evening Take 10 mg (2 tablets) every am, and 5 mg (1 tablet) every evening..  Doctor's comments:  Please include the quantity of tablets and (strength) per dose in sig.                     rosuvastatin 10 MG tablet  Also known as:  CRESTOR  INSTRUCTIONS:  Take 1 tablet (10 mg) by mouth daily                     senna-docusate 8.6-50 MG tablet  Also known as:  SENOKOT-S/PERICOLACE  INSTRUCTIONS:  Take 1 tablet by mouth 2 times daily                     SINGULAIR 10 MG tablet  INSTRUCTIONS:  Take 1 tablet (10 mg) by mouth At Bedtime  Generic drug:  montelukast                     sulfamethoxazole-trimethoprim 800-160 MG tablet  Also known as:  BACTRIM DS/SEPTRA DS  INSTRUCTIONS:  Take 1 tablet by mouth in the morning, Mon and Thur  Reason for med:  Preventative Medication Therapy Used Around Surgery                     * tacrolimus 1 MG capsule  Also known as:  GENERIC EQUIVALENT  INSTRUCTIONS:  Take (3.5 mg) by mouth every AM and PM. THREE (1 mg caps) ONE capsule of (0.5 mg)  Doctor's comments:  Increase in dose                     * tacrolimus 0.5 MG capsule  Also known as:   GENERIC EQUIVALENT  INSTRUCTIONS:  Take ONE capsule with THREE 1 mg capsules (3.5 mg) twice daily.  Doctor's comments:  Correction.                     valGANciclovir 450 MG tablet  Also known as:  VALCYTE  INSTRUCTIONS:  Take 2 tablets (900 mg) by mouth daily  Reason for med:  Medication Treatment to Prevent Cytomegalovirus Disease                        * This list has 2 medication(s) that are the same as other medications prescribed for you. Read the directions carefully, and ask your doctor or other care provider to review them with you.

## 2019-04-24 NOTE — DISCHARGE INSTRUCTIONS
[facility]                        Interventional Cardiology  Discharge Instructions   Post Right Heart Cath and Biopsy      AFTER YOU GO HOME:    DO drink plenty of fluids    DO resume your regular diet and medications unless otherwise instructed by your Primary Physician    Do Not scrub the procedure site vigorously    No lotion or powder to the puncture site for 3 days    CALL YOUR PRIMARY PHYSICIAN IF: You may resume all normal activity.  Monitor neck site for bleeding, swelling, or voice changes. If you notice bleeding or swelling immediately apply pressure to the site and call number below to speak with Cardiology Fellow.  If you experience any changes in your breathing you should call your doctor immediately or come to the closest Emergency Department.  Do not drive yourself.    ADDITIONAL INSTRUCTIONS: Medications: You are to resume all home medications including anticoagulation therapy unless otherwise advised by your primary cardiologist or nurse coordinator.    Follow Up: Per your primary cardiology team    If you have any questions or concerns regarding your procedure site please call [phone] at anytime and ask for Cardiology Fellow on call.  They are available 24 hours a day.  You may also contact the Cardiology Clinic after hours number at [phone].                                                       Telephone Numbers [phone] Monday-Friday 8:00 am to 4:30 pm    [phone] After 4:30 pm Monday-Friday, Weekends & Holidays  Ask for Interventional Cardiologist on call. Someone is on call 24 hours/day   [facility] toll free number [phone] Monday-Friday 8:00 am to 4:30 pm   [facility] Emergency Dept [phone]

## 2019-04-24 NOTE — PROGRESS NOTES
[facility]   Interventional Cardiology        Consenting/Education for Right Heart Catheterization/Endomyocardial Biopsy    Patient understands during the portion for right heart catheterization a fine tube (catheter) is put into the vein of the groin/neck.  It is carefully passed along until it reaches the heart and then goes up into the blood vessels of the lungs. This is done to measure a variety of pressures in your heart and can tell us how well the heart is filling and emptying, as well as monitor fluid status. While the catheter is in your neck/groin vein, a  Biopsy forceps  or pincers at the end of the catheter are used to take the tissue samples. This is may be repeated to get enough samples. The biopsy pieces are very small (one to two millimeters).     Patient also understands risks and complications of the procedure which include, but are not limited to bruising/swelling around the incision site, infection, bleeding, allergic reaction to local anesthetic, air embolism, arterial puncture, stroke, heart attack.      Patient verbalized understanding of risks and benefits of the right heart catheterization, and endomyocardial biopsy and has elected to proceed with the procedure.       Stevie Alejo PA-C  [facility] Cardiology Team

## 2019-04-25 LAB
CMV DNA SPEC NAA+PROBE-ACNC: NORMAL [IU]/ML
CMV DNA SPEC NAA+PROBE-LOG#: NORMAL {LOG_IU}/ML
COPATH REPORT: NORMAL
SPECIMEN SOURCE: NORMAL

## 2019-04-26 ENCOUNTER — RECORDS - HEALTHEAST (OUTPATIENT)
Dept: ADMINISTRATIVE | Facility: OTHER | Age: 54
End: 2019-04-26

## 2019-04-26 ENCOUNTER — TELEPHONE (OUTPATIENT)
Dept: TRANSPLANT | Facility: CLINIC | Age: 54
End: 2019-04-26

## 2019-04-26 ENCOUNTER — OFFICE VISIT (OUTPATIENT)
Dept: CARDIOLOGY | Facility: CLINIC | Age: 54
End: 2019-04-26
Attending: NURSE PRACTITIONER
Payer: COMMERCIAL

## 2019-04-26 ENCOUNTER — OFFICE VISIT (OUTPATIENT)
Dept: PHARMACY | Facility: CLINIC | Age: 54
End: 2019-04-26
Payer: COMMERCIAL

## 2019-04-26 VITALS
WEIGHT: 133.5 LBS | DIASTOLIC BLOOD PRESSURE: 86 MMHG | HEIGHT: 68 IN | HEART RATE: 87 BPM | SYSTOLIC BLOOD PRESSURE: 135 MMHG | BODY MASS INDEX: 20.23 KG/M2 | OXYGEN SATURATION: 99 %

## 2019-04-26 DIAGNOSIS — Z94.1 HEART REPLACED BY TRANSPLANT (H): ICD-10-CM

## 2019-04-26 DIAGNOSIS — Z94.1 HEART REPLACED BY TRANSPLANT (H): Primary | ICD-10-CM

## 2019-04-26 DIAGNOSIS — Z94.1 S/P ORTHOTOPIC HEART TRANSPLANT (H): ICD-10-CM

## 2019-04-26 DIAGNOSIS — R11.0 NAUSEA: ICD-10-CM

## 2019-04-26 DIAGNOSIS — J30.1 ALLERGIC RHINITIS DUE TO POLLEN, UNSPECIFIED SEASONALITY: ICD-10-CM

## 2019-04-26 DIAGNOSIS — D72.9 ABNORMAL WHITE BLOOD CELL (WBC) COUNT: ICD-10-CM

## 2019-04-26 DIAGNOSIS — M54.9 ACUTE BACK PAIN, UNSPECIFIED BACK LOCATION, UNSPECIFIED BACK PAIN LATERALITY: ICD-10-CM

## 2019-04-26 LAB
ACANTHOCYTES BLD QL SMEAR: SLIGHT
ANION GAP SERPL CALCULATED.3IONS-SCNC: 9 MMOL/L (ref 3–14)
ANISOCYTOSIS BLD QL SMEAR: SLIGHT
BASOPHILS # BLD AUTO: 0 10E9/L (ref 0–0.2)
BASOPHILS NFR BLD AUTO: 2.9 %
BUN SERPL-MCNC: 47 MG/DL (ref 7–30)
CALCIUM SERPL-MCNC: 8.7 MG/DL (ref 8.5–10.1)
CHLORIDE SERPL-SCNC: 104 MMOL/L (ref 94–109)
CO2 SERPL-SCNC: 26 MMOL/L (ref 20–32)
CREAT SERPL-MCNC: 1.8 MG/DL (ref 0.66–1.25)
DACRYOCYTES BLD QL SMEAR: SLIGHT
DIFFERENTIAL METHOD BLD: ABNORMAL
EOSINOPHIL # BLD AUTO: 0 10E9/L (ref 0–0.7)
EOSINOPHIL NFR BLD AUTO: 3.9 %
ERYTHROCYTE [DISTWIDTH] IN BLOOD BY AUTOMATED COUNT: 17.1 % (ref 10–15)
GFR SERPL CREATININE-BSD FRML MDRD: 41 ML/MIN/{1.73_M2}
GLUCOSE SERPL-MCNC: 134 MG/DL (ref 70–99)
HCT VFR BLD AUTO: 30.8 % (ref 40–53)
HGB BLD-MCNC: 9.4 G/DL (ref 13.3–17.7)
LYMPHOCYTES # BLD AUTO: 0.4 10E9/L (ref 0.8–5.3)
LYMPHOCYTES NFR BLD AUTO: 51.9 %
MAGNESIUM SERPL-MCNC: 2.2 MG/DL (ref 1.6–2.3)
MCH RBC QN AUTO: 30.2 PG (ref 26.5–33)
MCHC RBC AUTO-ENTMCNC: 30.5 G/DL (ref 31.5–36.5)
MCV RBC AUTO: 99 FL (ref 78–100)
MONOCYTES # BLD AUTO: 0 10E9/L (ref 0–1.3)
MONOCYTES NFR BLD AUTO: 3.8 %
MYELOCYTES # BLD: 0 10E9/L
MYELOCYTES NFR BLD MANUAL: 1 %
NEUTROPHILS # BLD AUTO: 0.3 10E9/L (ref 1.6–8.3)
NEUTROPHILS NFR BLD AUTO: 36.5 %
OVALOCYTES BLD QL SMEAR: SLIGHT
PHOSPHATE SERPL-MCNC: 3.6 MG/DL (ref 2.5–4.5)
PLATELET # BLD AUTO: 169 10E9/L (ref 150–450)
PLATELET # BLD EST: ABNORMAL 10*3/UL
POIKILOCYTOSIS BLD QL SMEAR: SLIGHT
POTASSIUM SERPL-SCNC: 3.8 MMOL/L (ref 3.4–5.3)
RBC # BLD AUTO: 3.11 10E12/L (ref 4.4–5.9)
SODIUM SERPL-SCNC: 138 MMOL/L (ref 133–144)
TACROLIMUS BLD-MCNC: 9.1 UG/L (ref 5–15)
TME LAST DOSE: NORMAL H
WBC # BLD AUTO: 0.7 10E9/L (ref 4–11)

## 2019-04-26 PROCEDURE — 36415 COLL VENOUS BLD VENIPUNCTURE: CPT | Performed by: NURSE PRACTITIONER

## 2019-04-26 PROCEDURE — 87799 DETECT AGENT NOS DNA QUANT: CPT | Performed by: INTERNAL MEDICINE

## 2019-04-26 PROCEDURE — G0463 HOSPITAL OUTPT CLINIC VISIT: HCPCS | Mod: ZF

## 2019-04-26 PROCEDURE — 80197 ASSAY OF TACROLIMUS: CPT | Performed by: INTERNAL MEDICINE

## 2019-04-26 PROCEDURE — 99215 OFFICE O/P EST HI 40 MIN: CPT | Mod: ZP | Performed by: NURSE PRACTITIONER

## 2019-04-26 PROCEDURE — 99607 MTMS BY PHARM ADDL 15 MIN: CPT | Performed by: PHARMACIST

## 2019-04-26 PROCEDURE — 84100 ASSAY OF PHOSPHORUS: CPT | Performed by: INTERNAL MEDICINE

## 2019-04-26 PROCEDURE — 85025 COMPLETE CBC W/AUTO DIFF WBC: CPT | Performed by: INTERNAL MEDICINE

## 2019-04-26 PROCEDURE — 80048 BASIC METABOLIC PNL TOTAL CA: CPT | Performed by: INTERNAL MEDICINE

## 2019-04-26 PROCEDURE — 99605 MTMS BY PHARM NP 15 MIN: CPT | Performed by: PHARMACIST

## 2019-04-26 PROCEDURE — 83735 ASSAY OF MAGNESIUM: CPT | Performed by: INTERNAL MEDICINE

## 2019-04-26 RX ORDER — OMEPRAZOLE 40 MG/1
40 CAPSULE, DELAYED RELEASE ORAL DAILY
COMMUNITY
Start: 2019-04-26 | End: 2021-12-14

## 2019-04-26 RX ORDER — PREDNISONE 5 MG/1
5 TABLET ORAL 2 TIMES DAILY
Qty: 90 TABLET | Refills: 1 | Status: SHIPPED | OUTPATIENT
Start: 2019-04-26 | End: 2019-05-24

## 2019-04-26 RX ORDER — BALSALAZIDE DISODIUM 750 MG/1
CAPSULE ORAL
COMMUNITY
Start: 2018-11-09 | End: 2019-09-23

## 2019-04-26 RX ORDER — ALBUTEROL SULFATE 0.83 MG/ML
2.5 SOLUTION RESPIRATORY (INHALATION) ONCE
Status: CANCELLED | OUTPATIENT
Start: 2019-04-29

## 2019-04-26 RX ORDER — PENTAMIDINE ISETHIONATE 300 MG/300MG
300 INHALANT RESPIRATORY (INHALATION) ONCE
Status: CANCELLED | OUTPATIENT
Start: 2019-04-29

## 2019-04-26 RX ORDER — TACROLIMUS 1 MG/1
CAPSULE ORAL
Qty: 180 CAPSULE | Refills: 11 | Status: SHIPPED | OUTPATIENT
Start: 2019-04-26 | End: 2019-04-29

## 2019-04-26 RX ORDER — VALGANCICLOVIR 450 MG/1
TABLET, FILM COATED ORAL
Qty: 60 TABLET | Refills: 0 | Status: ON HOLD
Start: 2019-04-26 | End: 2019-05-08

## 2019-04-26 RX ORDER — SULFAMETHOXAZOLE/TRIMETHOPRIM 800-160 MG
TABLET ORAL
Qty: 10 TABLET | Refills: 0 | Status: ON HOLD
Start: 2019-04-26 | End: 2019-05-08

## 2019-04-26 RX ORDER — MYCOPHENOLATE MOFETIL 250 MG/1
750 CAPSULE ORAL 2 TIMES DAILY
Qty: 180 CAPSULE | Refills: 11 | Status: ON HOLD | OUTPATIENT
Start: 2019-04-26 | End: 2019-05-08

## 2019-04-26 RX ORDER — TACROLIMUS 0.5 MG/1
CAPSULE ORAL
Qty: 30 CAPSULE | Refills: 11 | Status: SHIPPED | OUTPATIENT
Start: 2019-04-26 | End: 2019-04-29

## 2019-04-26 RX ORDER — OXYCODONE HYDROCHLORIDE 5 MG/1
TABLET ORAL
COMMUNITY
Start: 2019-04-22 | End: 2019-04-29

## 2019-04-26 RX ORDER — FLUTICASONE PROPIONATE 50 MCG
1 SPRAY, SUSPENSION (ML) NASAL 2 TIMES DAILY PRN
COMMUNITY
End: 2019-10-03

## 2019-04-26 ASSESSMENT — MIFFLIN-ST. JEOR: SCORE: 1410.05

## 2019-04-26 ASSESSMENT — PAIN SCALES - GENERAL: PAINLEVEL: NO PAIN (0)

## 2019-04-26 NOTE — NURSING NOTE
Vitals completed successfully and medication reconciled.     Cecilia Alvarado, SAMIR  9:05 AM  Chief Complaint   Patient presents with     Follow Up     Return CORE, 54 yo male, Systolic HF, EF 16%, labs prior.

## 2019-04-26 NOTE — Clinical Note
FYI: patient is taking TAC 3mg qAM and 3.5mg qPM, rather than vice versa. Let patient know if you want him to change.

## 2019-04-26 NOTE — NURSING NOTE
"Patient seen in clinic for routine 8-week post heart transplant visit with NP. Resulted labs reviewed. WBC 0.7, ANC 0.3, med changes/plan: discontinued bactrim and valcyte, start pentamidine neb next week, BMP and CBC with diff on Monday, weekly CMV. Cr. 1.80 trending up, RHC with increased filling pressures, weight trending up, bloated abdomen and mild LE edema, take bumex 2 mg tid through Sunday and call Monday with weight/fluid status. 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 Tacro level (confirmed 12-hour trough and current dose 3.5/3), CMV and EBV pending. VSS. BP today 135/86 and HR 87. C/o \"clogged\" R ear, instructed to try OTC zyrtec for seasonal allergies. C/o back pain r/t L1 compression fracture, pt. knocked over by brother's dog, partially relieved by prn oxycodone, pt. following up with [facility] spine clinic on Monday. C/o R groin pain/stiffness, improving. Denies chest pain, shortness of breath, fatigue, fever, night sweats. Patient trying to walk more/increase activity as able, using cane for stability, MATA with stairs, will start cardiac rehab once cleared by spine clinic. Good appetite, watching fluid and salt intake. Denies N/V and diarrhea. Education: mask for neutropenia,  kits for tacro levels, sternal precautions, incentive spirometer. Plan for labs on Monday, weekly CMV monitoring, pentamidine tx next week, weight/fluid f/u next week, and RTC on 5/8 for routine 10-week visit. Txp. Coordinator to call with pending results.  Patient s questions/concerns addressed. AVS printed. Patient verbalized understanding and next steps.     Patient instructions:  1. Stop taking valcyte and bactrim.  2. Decrease mycophenolate to 750 mg two times daily.   2. Schedule pentamidine nebulizer for Monday.   3. Decrease prednisone to 5 mg two times a day.   5. Take an extra 2 mg of bumex today, Saturday and Sunday. Call Txp Coordinator next week with update on weight/fluid status.   6. Weekly " labs.   7. Labs on Monday: BMP, Mg, phos, CBC with differential.   8. Continue to use your incentive spirometer at home to help with breathing and lung expansion.  RTC on May 8 for 10-week visit.  Your Transplant Coordinator will call with pending results.  Please call your Transplant Coordinator with questions/concerns [phone].

## 2019-04-26 NOTE — PROGRESS NOTES
ADULT HEART TRANSPLANT CLINIC  April 26, 2019    HPI:   Mr. Everton Larios is 54yr old male with a history of ASD (s/p repair in childhood), AFib/AF (s/p ablation), NICM, diastolic dysfunction, alcohol abuse, Pierce's esophagus, ulcerative colitis, GIB (s/p splenic embolization), and hypothyroidism, now s/p OHT and bypass of persistent left SVC to right atrial appendage 2/24/19, who presents to clinic for routine surveillance.    His postoperative course was c/b RV dysfunction, small pneumoperitoneum (clinically insignificant), chest wall hematoma (former ICD site, s/p evacuation and drain placement 3/31/19), HCAP/klebsiella pneumonia, and FRANKLIN (required short-term HD, now recovered).  He was transferred to  rehab 4/3, and ultimately discharged to his local residence 4/15.    His biopsy 3/25/19 showed mild AMR1 with some staining for c4d.  Repeat biopsy 3/28 showed improved AMR and less reactive capillary staining, and subsequent biopsies have now shown resolution of AMR and improved capillary staining.  Baseline coronary angiogram has been deferred due to renal function.  His last echo 3/26 showed moderate concentric wall thickening c/w LVH, with LVEF 60-65% and moderately dilated RV with mild-moderate RV dysfunction.    Since discharge, Mr. Larios states that he fell on Sunday.  He went in to the ED (OS[facility]), where he was found to have a compression fracture.  He is on oxycodone for back pain, and is to follow up with ortho on Monday (OSH).  He wants to start cardiac rehab if cleared by ortho.  He states that he was rehab'ing well, and feels like his strength and endurance continue to improve in spite of his fall.  His legs are stronger, and he can navigate stairs better.  He has been walking more, with improved endurance and less SOB.  His weight was 121# when he discharged from rehab.  He was up to 128# at home, then down to 126# yesterday.  He took an extra bumex dose yesterday, and his weight was down to 124#  today.  He does feel fluid retention, and notes that he retains more when he takes narcotics, as narcotics constipate him.  He restarted senna yesterday due to constipation.  He has been monitoring his salt intake.  He is eating well, with good appetite and no nausea, vomiting, or diarrhea.  He feels cold a lot, but denies fevers, chills, cough, sore throat, ill contacts, and other ill symptoms.  He feels like his right ear is plugged.  His BPs and HRs remain stable.  He otherwise denies chest pain, palpitations, PND, orthopnea, dizziness, headaches, and LUE swelling.    Serostatus:  CMV D+/R+  EBV D+/R+      Patient Active Problem List    Diagnosis Date Noted     Debility 04/03/2019     Priority: Medium     Pneumonia of left lower lobe due to Klebsiella oxytoca (H) 03/24/2019     Priority: Medium     Prophylactic antibiotic 03/24/2019     Priority: Medium     Heart failure (H) 02/25/2019     Priority: Medium     Heart replaced by transplant (H) 02/24/2019     Priority: Medium     Donor CMV positive/ Recipient CMV positive.   Donor EBV positive/ Recipient EBV positive.     Donor NOT PHS higher risk          Chronic HFrEF (heart failure with reduced ejection fraction) (H) 02/17/2019     Priority: Medium     Acute on chronic systolic congestive heart failure (H) 02/08/2019     Priority: Medium     Added automatically from request for surgery [id number]       Heart failure, chronic, with acute decompensation (H) 11/15/2018     Priority: Medium     Iron deficiency anemia 10/18/2018     Priority: Medium     H/O congenital atrial septal defect (ASD) repair at age 3 10/04/2018     Priority: Medium     Nonischemic cardiomyopathy (H) 10/04/2018     Priority: Medium     Paroxysmal atrial fibrillation (H) 10/04/2018     Priority: Medium     Ventricular tachycardia (H) 10/04/2018     Priority: Medium     On amiodarone therapy 10/04/2018     Priority: Medium     Ulcerative colitis (H) 10/04/2018     Priority: Medium     ICD, [employer] 2008; gen change 2/2018 10/04/2018     Priority: Medium     S/P ablation of atrial fibrillation 10/04/2018     Priority: Medium     Hypothyroidism due to medication 10/04/2018     Priority: Medium     Chronic systolic heart failure (H) 10/04/2018     Priority: Medium     DJD (degenerative joint disease) - neck 10/04/2018     Priority: Medium     Pierce's esophagus 10/04/2018     Priority: Medium     Intermittent asthma without complication 10/04/2018     Priority: Medium     Chronic rhinitis 10/04/2018     Priority: Medium     Depression 10/04/2018     Priority: Medium       PAST MEDICAL HISTORY:  Past Medical History:   Diagnosis Date     Alcohol abuse      Pierce's esophagus 10/4/2018     Chronic rhinitis 10/4/2018     Chronic systolic heart failure (H) 10/4/2018     Depression 10/4/2018     Diastolic dysfunction      DJD (degenerative joint disease) - neck 10/4/2018     H/O congenital atrial septal defect (ASD) repair at age 3 10/4/2018     Hypothyroidism due to medication 10/4/2018     ICD, [employer] 2008; gen change 2/2018 10/4/2018     Intermittent asthma without complication 10/4/2018     Nonischemic cardiomyopathy (H) 10/4/2018     On amiodarone therapy 10/4/2018     Paroxysmal atrial fibrillation (H) 10/4/2018     S/P ablation of atrial fibrillation 10/4/2018     Systolic heart failure (H)      Ulcerative colitis (H) 10/4/2018     Ventricular tachycardia (H) 10/4/2018       CURRENT MEDICATIONS:  Prescription Medications as of 4/26/2019       Rx Number Disp Refills Start End Last Dispensed Date Next Fill Date Owning Pharmacy    acetaminophen (TYLENOL) 325 MG tablet    4/1/2019        Sig: Take 2 tablets (650 mg) by mouth every 4 hours as needed for mild pain    Class: Transitional    Route: Oral    albuterol (PROAIR HFA/PROVENTIL HFA/VENTOLIN HFA) 108 (90 Base) MCG/ACT inhaler    10/4/2018        Sig: Inhale 2 puffs into the lungs every 6 hours as needed for shortness of breath / dyspnea  or wheezing    Class: Historical    Route: Inhalation    aspirin (ASA) 81 MG chewable tablet    4/2/2019        Sig: Take 1 tablet (81 mg) by mouth daily    Class: Transitional    Route: Oral    azelastine (ASTELIN) 0.1 % nasal spray    4/1/2019        Sig: Spray 2 sprays into both nostrils 2 times daily as needed for rhinitis or allergies    Class: Transitional    Route: Both Nostrils    balsalazide (COLAZAL) 750 MG capsule    11/9/2018    [facility] [id number] [place] [address]    Sig: TAKE 5 CAPSULES BY MOUTH IN THE MORNING AND 4 CAPSULES IN THE EVENING    Class: Historical    bumetanide (BUMEX) 2 MG tablet  60 tablet 0 4/12/2019    [facility] [place] [address]    Sig: Take 1 tablet (2 mg) by mouth 2 times daily    Class: E-Prescribe    Route: Oral    calcium carbonate (TUMS) 500 MG chewable tablet    4/1/2019        Sig: Take 1 tablet (500 mg) by mouth daily as needed for heartburn    Class: Transitional    Route: Oral    calcium carbonate 600 mg-vitamin D 400 units (CALTRATE) 600-400 MG-UNIT per tablet    4/1/2019        Sig: Take 1 tablet by mouth 2 times daily (with meals)    Class: Transitional    Route: Oral    DULoxetine (CYMBALTA) 20 MG EC capsule            Sig: Take 20 mg by mouth daily    Class: Historical    Route: Oral    fluticasone (FLOVENT HFA) 220 MCG/ACT Inhaler            Sig: Inhale 2 puffs into the lungs 2 times daily    Class: Historical    Route: Inhalation    levothyroxine (SYNTHROID/LEVOTHROID) 100 MCG tablet            Sig: Take 100 mcg by mouth daily     Class: Historical    Route: Oral    magnesium oxide (MAG-OX) 400 MG tablet  180 tablet 3 4/19/2019    [facility] [id number] [place] [address]    Sig: Take 1 tablet (400 mg) by mouth 2 times daily    Class: E-Prescribe    Notes to Pharmacy: Increase in dose    Route: Oral    melatonin 3 MG tablet    4/1/2019        Sig: Take 2 tablets (6 mg) by mouth At Bedtime     Class: Transitional    Route: Oral    mesalamine (ASACOL HD) 800 MG EC tablet    10/4/2018        Sig: Take 3 tablets (2,400 mg) by mouth 2 times daily    Class: Historical    Route: Oral    montelukast (SINGULAIR) 10 MG tablet    10/4/2018        Sig: Take 1 tablet (10 mg) by mouth At Bedtime    Class: Historical    Route: Oral    multivitamin w/minerals (THERA-VIT-M) tablet    4/12/2019        Sig: Take 1 tablet by mouth daily    Class: OTC    Route: Oral    mycophenolate (GENERIC EQUIVALENT) 250 MG capsule  240 capsule 11 4/24/2019    [facility] [id number] [place] [address]    Sig: Take 4 capsules (1,000 mg) by mouth 2 times daily    Class: E-Prescribe    Notes to Pharmacy: Decrease in dose    Route: Oral    nystatin (MYCOSTATIN) [id number] UNIT/ML suspension  600 mL 0 4/11/2019    [place] [address] [address]    Sig: Take 5 mLs (500,000 Units) by mouth 4 times daily    Class: E-Prescribe    Route: Oral    omeprazole (PRILOSEC) 40 MG DR capsule    4/1/2019        Sig: Take 1 capsule (40 mg) by mouth 2 times daily (before meals)    Class: Transitional    Route: Oral    polyethylene glycol (MIRALAX/GLYCOLAX) packet    4/1/2019        Sig: Take 17 g by mouth daily as needed for constipation    Class: Transitional    Route: Oral    potassium chloride ER (K-DUR/KLOR-CON M) 20 MEQ CR tablet  120 tablet 0 4/11/2019    [place] [address] [address]    Sig: Take 2 tablets (40 mEq) by mouth 2 times daily    Class: E-Prescribe    Route: Oral    predniSONE (DELTASONE) 5 MG tablet  90 tablet 0 4/12/2019    [place] [address] [address]    Sig: Take 5 mg by mouth every evening Take 10 mg (2 tablets) every am, and 5 mg (1 tablet) every evening..    Class: E-Prescribe    Notes to Pharmacy: Please include the quantity of tablets and (strength) per dose in sig.    Route: Oral    rosuvastatin (CRESTOR) 10 MG tablet  30  "tablet 0 4/11/2019    [place] [address] [address]    Sig: Take 1 tablet (10 mg) by mouth daily    Class: E-Prescribe    Route: Oral    senna-docusate (SENOKOT-S/PERICOLACE) 8.6-50 MG tablet    4/1/2019        Sig: Take 1 tablet by mouth 2 times daily    Class: Transitional    Route: Oral    sulfamethoxazole-trimethoprim (BACTRIM DS/SEPTRA DS) 800-160 MG tablet  10 tablet 0 4/15/2019    [place] [address] [address]    Sig: Take 1 tablet by mouth in the morning, Mon and Thur    Class: E-Prescribe    Route: Oral    tacrolimus (GENERIC EQUIVALENT) 0.5 MG capsule  30 capsule 11 4/24/2019    [facility] [id number] [place] [address]    Sig: Take ONE capsule with THREE 1 mg capsules (3.5 mg) every AM    Class: E-Prescribe    tacrolimus (GENERIC EQUIVALENT) 1 MG capsule  180 capsule 11 4/24/2019    [facility] [id number] [place] [address]    Sig: Take three 1 mg caps with one 0.5 mg cap (3.5 mg) every AM and three 1 mg capsules (3 mg) every PM    Class: E-Prescribe    valGANciclovir (VALCYTE) 450 MG tablet  60 tablet 0 4/12/2019    [place] [address] [address]    Sig: Take 2 tablets (900 mg) by mouth daily    Class: E-Prescribe    Route: Oral          ROS:   Constitutional: No fever, chills, or sweats. Weight gain.   ENT: As per HPI.  Allergies/Immunologic: Negative.   Respiratory: As per HPI.  Cardiovascular: As per HPI.   GI: No nausea, vomiting, hematemesis, melena, or hematochezia.   : No urinary frequency, dysuria, or hematuria.   Integument: Negative.   Psychiatric: Negative.   Neuro: Negative.   Endocrinology: Negative.   Musculoskeletal: As per HPI.    Exam:  /86 (BP Location: Right arm, Patient Position: Chair, Cuff Size: Adult Regular)   Pulse 87   Ht 1.727 m (5' 8\")   Wt 60.6 kg (133 lb 8 oz)   SpO2 99%   BMI 20.30 kg/m    In general, the patient is a pleasant male " in no apparent distress.    HEENT: NC/AT. PERRLA. EOMI.  Sclerae white, not injected.  No thrush noted on tongue or cheeks.  Neck:  No adenopathy, No thyromegaly.    COR: JVD at jaw when sitting upright.  RRR.  Normal S1 S2 splits physiologically.  No murmur, rub click, or gallop.    Lungs:  CTA. No rhonchi. Decreased LS throughout, no increased WOB.   Abdomen: soft, nontender, nondistended.  No organomegaly.  Extremities:  No clubbing or cyanosis, mild bilateral LE edema with pitting at ankles  Neuro: Alert & Oriented x 3, grossly non focal.  Integument: no open lesions, rashes, or jaundice.    Labs:  CBC RESULTS:   Lab Results   Component Value Date    WBC 0.7 (LL) 04/26/2019    RBC 3.11 (L) 04/26/2019    HGB 9.4 (L) 04/26/2019    HCT 30.8 (L) 04/26/2019    MCV 99 04/26/2019    MCH 30.2 04/26/2019    MCHC 30.5 (L) 04/26/2019    RDW 17.1 (H) 04/26/2019     04/26/2019       BMP RESULTS:  Lab Results   Component Value Date     04/24/2019    POTASSIUM 5.2 04/24/2019    CHLORIDE 105 04/24/2019    CO2 20 04/24/2019    ANIONGAP 11 04/24/2019    GLC 83 04/24/2019    BUN 51 (H) 04/24/2019    CR 1.70 (H) 04/24/2019    GFRESTIMATED 44 (L) 04/24/2019    GFRESTBLACK 51 (L) 04/24/2019    RADAMES 8.7 04/24/2019      LIPID RESULTS:  Lab Results   Component Value Date    CHOL 194 11/15/2018    HDL 42 11/15/2018     (H) 11/15/2018    TRIG 112 11/15/2018    NHDL 152 (H) 11/15/2018       IMMUNOSUPPRESSANT LEVELS  Lab Results   Component Value Date    TACROL 11.4 04/24/2019    DOSTAC Not Provided 04/24/2019       No components found for: CK  Lab Results   Component Value Date    MAG 1.5 (L) 04/24/2019     Lab Results   Component Value Date    A1C 5.7 (H) 02/23/2019     Lab Results   Component Value Date    PHOS 4.1 04/24/2019     Lab Results   Component Value Date    NTBNPI 45,480 (H) 03/27/2019     Lab Results   Component Value Date    SAITESMAYKELET SA FCS 03/27/2019    RUBÉN Class I 03/27/2019    QT2YHQRBN None  03/27/2019    RQ7RSBTQFP None 03/27/2019    SAIREPCOM  03/27/2019      Test performed by modified procedure. Serum heat inactivated and tested   by a modified ([place]) protocol including fetal calf serum addition.   High-risk, mfi >3,000. Mod-risk, mfi 500-3,000.       Lab Results   Component Value Date    SAIITESTME SA FCS 03/27/2019    SAIICELL Class II 03/27/2019    OE9YDNMXR None 03/27/2019    PU1LSSZOQG None 03/27/2019    SAIIREPCOM  03/27/2019      Test performed by modified procedure. Serum heat inactivated and tested   by a modified ([place]) protocol including fetal calf serum addition.   High-risk, mfi >3,000. Mod-risk, mfi 500-3,000.       Lab Results   Component Value Date    CSPEC Plasma, EDTA anticoagulant 04/24/2019       Assessment and Plan:  Mr. Everton Larios is 54yr old male with a history of ASD (s/p repair in childhood), AFib/AF (s/p ablation), NICM, diastolic dysfunction, alcohol abuse, Pierce's esophagus, ulcerative colitis, GIB (s/p splenic embolization), and hypothyroidism, now s/p OHT and bypass of persistent left SVC to right atrial appendage 2/24/19, who presents to clinic for routine surveillance.    NICM, s/p OHT and bypass of persistent left SVC to right atrial appendage 2/24/19  His postoperative course was c/b RV dysfunction, small pneumoperitoneum (clinically insignificant), chest wall hematoma (former ICD site, s/p evacuation and drain placement 3/31/19), HCAP/klebsiella pneumonia, and FRANKLIN (required short-term HD, now recovered).  He was transferred to  rehab 4/3, and ultimately discharged to his local residence 4/15.    His biopsy 3/25/19 showed mild AMR1 with some staining for c4d.  Repeat biopsy 3/28 showed improved AMR and less reactive capillary staining, and subsequent biopsies have now shown resolution of AMR and improved capillary staining.  Baseline coronary angiogram has been deferred due to renal function.  His last echo 3/26 showed moderate concentric wall thickening c/w  LVH, with LVEF 60-65% and moderately dilated RV with mild-moderate RV dysfunction.    Labs today show stable electrolytes.  Creatinine up to 1.8.  CBC shows WBC down to 0.7 with ANC down to 0.3.  Other blood counts stable.  Tacro level pending.    RHC from this week showed RA 15, PCW 23, mPA 35, and CI 2.91.    Mr. Larios appears hypervolemic today.  Advised that he increase bumex to 2mg TID (from BID) for today, tomorrow, and Sunday.  Will repeat a BMP on Monday, and determine further diuretics at that point.  Asked that he continue to weigh himself daily, and to continue to limit salt intake.    His HRs remain stable, and his BPs remain within goal range.    As his WBC and ANC have decreased, advised the following medication changes:  - decrease MMF to 750mg twice daily  - stop valcyte -- will need to check CMV titers weekly given CMV status (+/+)  - stop bactrim -- will need to schedule pentamidine inhalation for PCP ppx, will be due q28 days  - as biopsy was negative and showed improvement in capillary staining this week, will decrease prednisone to 5mg twice daily    Will plan to repeat a CBC with diff on Monday to follow up these changes, and to determine WBC trend.  Asked that he call the on-call transplant coordinator over the weekend if he were to experience any changes in his symptoms.  Enforced that he come to [facility] ED if he were to develop any ill symptoms.        Serostatus:  - CMV D+/R+  - EBV D+/R+    Immunosuppression:  - tacrolimus, goal level 8-10.  Level from today pending.  - decreasing MMF to 750mg twice daily  - pred taper -- weaning to 5mg twice daily today given this week's negative biopsy and improving capillary staining    Graft function:  - BPs:  Stable, remain <140/90  - HRs:  Stable, remain >80  - fluid status:  Hypervolemic, increasing bumex as noted above    PPx:  - CAV:  Aspirin 81mg daily and rosuvastatin 10mg daily  - Thrush:  Nystatin   - PCP:  D/c'ing bactrim due to creatinine and  leukopenia.  Need to schedule pentamidine.  - GERD:  Omeprazole twice daily given h/o carrera's esophagus  - CMV:  Stopping valcyte due to leukopenia/neutropenia.  Will order weekly CMV titers.  - Osteoporosis:  Calcium/vitamin D supplements    Health maintenance:  - recommended OTC zyrtec or claritin for plugging in right ear.  If does not improve, recommend PCP follow up.  - increase use of incentive spirometer       Right chest wall hematoma  SVC grafting  Hematoma developed at former ICD site, and he is now s/p evacuation 3/31.  Anticoagulation held, he remains on aspirin.  SVC graft reportedly patent, so will need to monitor for increased swelling in left upper extremity.  Will review need for anticoagulation with Piyush Donis and Harsh in the future.      Leukopenia  Neutropenia  Adjusting IMS and PPx as noted above.  Asked that he go to the ED with any ill symptoms.          The above was reviewed with  Ric, who verbalized understanding and will call with further questions/concerns.    45 minutes spent face-to-face with patient, with >50% in counseling and/or coordination of care as described above.      Elin Jensen, DNP, APRN, FNP-BC  Advanced Heart Failure Nurse Practitioner  [facility]  Patient Care Team:  Fredrick Garcia as PCP - General (Internal Medicine)  Jocelynn Jerez, RN as Transplant Coordinator  FREDRICK GARCIA

## 2019-04-26 NOTE — PATIENT INSTRUCTIONS
Recommendations from today's MTM visit:                                                        1.  pill box from pharmacy downstairs on your way out. Tell them Franko sent you and you are a post transplant patient looking for a pill box.     2.  Be sure to keep 12 hour window between your morning and evening Tacrolimus and Mycophenolate doses.     3. Move your Magnesium 2 hours away from Mycophenolate.     4. You can take Zyrtec (Cetirtizine) 10mg daily over the counter for your nasal drip.     Next MTM visit: 7/17 at 2 PM    To schedule another MTM appointment, please call the clinic directly or you may call the MTM scheduling line at [phone] or toll-free at [phone].     My Clinical Pharmacist's contact information:                                                      It was a pleasure talking with you today!  Please feel free to contact me with any questions or concerns you have.      Franko Preciaod, PharmD  MTM Pharmacist    Phone: [phone]     You may receive a survey about the MTM services you received by email and/or US Mail.  I would appreciate your feedback to help me serve you better in the future. Your comments will be anonymous.

## 2019-04-26 NOTE — TELEPHONE ENCOUNTER
DATE:  4/26/2019   TIME OF RECEIPT FROM LAB:  8:57  LAB TEST:  WBC  LAB VALUE:  0.7  RESULTS GIVEN WITH READ-BACK TO (PROVIDER):  9: 05  TIME LAB VALUE REPORTED TO PROVIDER:   Desiree Estes/wilian receipt confirmed

## 2019-04-26 NOTE — LETTER
4/26/2019    RE: Everton Larios  [address] [facility] [id number]   Dear Colleague,    Thank you for the opportunity to participate in the care of your patient, Everton Larios, at the [facility] HEART [facility]. Please see a copy of my visit note below.    ADULT HEART TRANSPLANT CLINIC  April 26, 2019    HPI:   Mr. Everton Larios is 54yr old male with a history of ASD (s/p repair in childhood), AFib/AF (s/p ablation), NICM, diastolic dysfunction, alcohol abuse, Pierce's esophagus, ulcerative colitis, GIB (s/p splenic embolization), and hypothyroidism, now s/p OHT and bypass of persistent left SVC to right atrial appendage 2/24/19, who presents to clinic for routine surveillance.    His postoperative course was c/b RV dysfunction, small pneumoperitoneum (clinically insignificant), chest wall hematoma (former ICD site, s/p evacuation and drain placement 3/31/19), HCAP/klebsiella pneumonia, and FRANKLIN (required short-term HD, now recovered).  He was transferred to  rehab 4/3, and ultimately discharged to his local residence 4/15.    His biopsy 3/25/19 showed mild AMR1 with some staining for c4d.  Repeat biopsy 3/28 showed improved AMR and less reactive capillary staining, and subsequent biopsies have now shown resolution of AMR and improved capillary staining.  Baseline coronary angiogram has been deferred due to renal function.  His last echo 3/26 showed moderate concentric wall thickening c/w LVH, with LVEF 60-65% and moderately dilated RV with mild-moderate RV dysfunction.    Since discharge, Mr. Larios states that he fell on Sunday.  He went in to the ED (OSH), where he was found to have a compression fracture.  He is on oxycodone for back pain, and is to follow up with ortho on Monday (OSH).  He wants to start cardiac rehab if cleared by ortho.  He states that he was rehab'ing well, and feels like his strength and endurance continue to improve in spite  of his fall.  His legs are stronger, and he can navigate stairs better.  He has been walking more, with improved endurance and less SOB.  His weight was 121# when he discharged from rehab.  He was up to 128# at home, then down to 126# yesterday.  He took an extra bumex dose yesterday, and his weight was down to 124# today.  He does feel fluid retention, and notes that he retains more when he takes narcotics, as narcotics constipate him.  He restarted senna yesterday due to constipation.  He has been monitoring his salt intake.  He is eating well, with good appetite and no nausea, vomiting, or diarrhea.  He feels cold a lot, but denies fevers, chills, cough, sore throat, ill contacts, and other ill symptoms.  He feels like his right ear is plugged.  His BPs and HRs remain stable.  He otherwise denies chest pain, palpitations, PND, orthopnea, dizziness, headaches, and LUE swelling.    Serostatus:  CMV D+/R+  EBV D+/R+      Patient Active Problem List    Diagnosis Date Noted     Debility 04/03/2019     Priority: Medium     Pneumonia of left lower lobe due to Klebsiella oxytoca (H) 03/24/2019     Priority: Medium     Prophylactic antibiotic 03/24/2019     Priority: Medium     Heart failure (H) 02/25/2019     Priority: Medium     Heart replaced by transplant (H) 02/24/2019     Priority: Medium     Donor CMV positive/ Recipient CMV positive.   Donor EBV positive/ Recipient EBV positive.     Donor NOT PHS higher risk          Chronic HFrEF (heart failure with reduced ejection fraction) (H) 02/17/2019     Priority: Medium     Acute on chronic systolic congestive heart failure (H) 02/08/2019     Priority: Medium     Added automatically from request for surgery [id number]       Heart failure, chronic, with acute decompensation (H) 11/15/2018     Priority: Medium     Iron deficiency anemia 10/18/2018     Priority: Medium     H/O congenital atrial septal defect (ASD) repair at age 3 10/04/2018     Priority: Medium     Nonischemic  cardiomyopathy (H) 10/04/2018     Priority: Medium     Paroxysmal atrial fibrillation (H) 10/04/2018     Priority: Medium     Ventricular tachycardia (H) 10/04/2018     Priority: Medium     On amiodarone therapy 10/04/2018     Priority: Medium     Ulcerative colitis (H) 10/04/2018     Priority: Medium     ICD, [employer] 2008; gen change 2/2018 10/04/2018     Priority: Medium     S/P ablation of atrial fibrillation 10/04/2018     Priority: Medium     Hypothyroidism due to medication 10/04/2018     Priority: Medium     Chronic systolic heart failure (H) 10/04/2018     Priority: Medium     DJD (degenerative joint disease) - neck 10/04/2018     Priority: Medium     Pierce's esophagus 10/04/2018     Priority: Medium     Intermittent asthma without complication 10/04/2018     Priority: Medium     Chronic rhinitis 10/04/2018     Priority: Medium     Depression 10/04/2018     Priority: Medium       PAST MEDICAL HISTORY:  Past Medical History:   Diagnosis Date     Alcohol abuse      Pierce's esophagus 10/4/2018     Chronic rhinitis 10/4/2018     Chronic systolic heart failure (H) 10/4/2018     Depression 10/4/2018     Diastolic dysfunction      DJD (degenerative joint disease) - neck 10/4/2018     H/O congenital atrial septal defect (ASD) repair at age 3 10/4/2018     Hypothyroidism due to medication 10/4/2018     ICD, [employer] 2008; gen change 2/2018 10/4/2018     Intermittent asthma without complication 10/4/2018     Nonischemic cardiomyopathy (H) 10/4/2018     On amiodarone therapy 10/4/2018     Paroxysmal atrial fibrillation (H) 10/4/2018     S/P ablation of atrial fibrillation 10/4/2018     Systolic heart failure (H)      Ulcerative colitis (H) 10/4/2018     Ventricular tachycardia (H) 10/4/2018       CURRENT MEDICATIONS:  Prescription Medications as of 4/26/2019       Rx Number Disp Refills Start End Last Dispensed Date Next Fill Date Owning Pharmacy    acetaminophen (TYLENOL) 325 MG tablet     4/1/2019        Sig: Take 2 tablets (650 mg) by mouth every 4 hours as needed for mild pain    Class: Transitional    Route: Oral    albuterol (PROAIR HFA/PROVENTIL HFA/VENTOLIN HFA) 108 (90 Base) MCG/ACT inhaler    10/4/2018        Sig: Inhale 2 puffs into the lungs every 6 hours as needed for shortness of breath / dyspnea or wheezing    Class: Historical    Route: Inhalation    aspirin (ASA) 81 MG chewable tablet    4/2/2019        Sig: Take 1 tablet (81 mg) by mouth daily    Class: Transitional    Route: Oral    azelastine (ASTELIN) 0.1 % nasal spray    4/1/2019        Sig: Spray 2 sprays into both nostrils 2 times daily as needed for rhinitis or allergies    Class: Transitional    Route: Both Nostrils    balsalazide (COLAZAL) 750 MG capsule    11/9/2018    [id number] [place] [address]    Sig: TAKE 5 CAPSULES BY MOUTH IN THE MORNING AND 4 CAPSULES IN THE EVENING    Class: Historical    bumetanide (BUMEX) 2 MG tablet  60 tablet 0 4/12/2019    [facility] [address]    Sig: Take 1 tablet (2 mg) by mouth 2 times daily    Class: E-Prescribe    Route: Oral    calcium carbonate (TUMS) 500 MG chewable tablet    4/1/2019        Sig: Take 1 tablet (500 mg) by mouth daily as needed for heartburn    Class: Transitional    Route: Oral    calcium carbonate 600 mg-vitamin D 400 units (CALTRATE) 600-400 MG-UNIT per tablet    4/1/2019        Sig: Take 1 tablet by mouth 2 times daily (with meals)    Class: Transitional    Route: Oral    DULoxetine (CYMBALTA) 20 MG EC capsule            Sig: Take 20 mg by mouth daily    Class: Historical    Route: Oral    fluticasone (FLOVENT HFA) 220 MCG/ACT Inhaler            Sig: Inhale 2 puffs into the lungs 2 times daily    Class: Historical    Route: Inhalation    levothyroxine (SYNTHROID/LEVOTHROID) 100 MCG tablet            Sig: Take 100 mcg by mouth daily     Class: Historical    Route: Oral    magnesium oxide (MAG-OX)  400 MG tablet  180 tablet 3 4/19/2019    [facility] [id number] [place] [address]    Sig: Take 1 tablet (400 mg) by mouth 2 times daily    Class: E-Prescribe    Notes to Pharmacy: Increase in dose    Route: Oral    melatonin 3 MG tablet    4/1/2019        Sig: Take 2 tablets (6 mg) by mouth At Bedtime    Class: Transitional    Route: Oral    mesalamine (ASACOL HD) 800 MG EC tablet    10/4/2018        Sig: Take 3 tablets (2,400 mg) by mouth 2 times daily    Class: Historical    Route: Oral    montelukast (SINGULAIR) 10 MG tablet    10/4/2018        Sig: Take 1 tablet (10 mg) by mouth At Bedtime    Class: Historical    Route: Oral    multivitamin w/minerals (THERA-VIT-M) tablet    4/12/2019        Sig: Take 1 tablet by mouth daily    Class: OTC    Route: Oral    mycophenolate (GENERIC EQUIVALENT) 250 MG capsule  240 capsule 11 4/24/2019    [facility] [id number] [place] [address]    Sig: Take 4 capsules (1,000 mg) by mouth 2 times daily    Class: E-Prescribe    Notes to Pharmacy: Decrease in dose    Route: Oral    nystatin (MYCOSTATIN) 278627 UNIT/ML suspension  600 mL 0 4/11/2019    [place] [address] [address]    Sig: Take 5 mLs (500,000 Units) by mouth 4 times daily    Class: E-Prescribe    Route: Oral    omeprazole (PRILOSEC) 40 MG DR capsule    4/1/2019        Sig: Take 1 capsule (40 mg) by mouth 2 times daily (before meals)    Class: Transitional    Route: Oral    polyethylene glycol (MIRALAX/GLYCOLAX) packet    4/1/2019        Sig: Take 17 g by mouth daily as needed for constipation    Class: Transitional    Route: Oral    potassium chloride ER (K-DUR/KLOR-CON M) 20 MEQ CR tablet  120 tablet 0 4/11/2019    [place] [address] [address]    Sig: Take 2 tablets (40 mEq) by mouth 2 times daily    Class: E-Prescribe    Route: Oral    predniSONE (DELTASONE) 5 MG tablet  90 tablet 0 4/12/2019    [facility]  [place] [address] [address]    Sig: Take 5 mg by mouth every evening Take 10 mg (2 tablets) every am, and 5 mg (1 tablet) every evening..    Class: E-Prescribe    Notes to Pharmacy: Please include the quantity of tablets and (strength) per dose in sig.    Route: Oral    rosuvastatin (CRESTOR) 10 MG tablet  30 tablet 0 4/11/2019    [place] [address] [address]    Sig: Take 1 tablet (10 mg) by mouth daily    Class: E-Prescribe    Route: Oral    senna-docusate (SENOKOT-S/PERICOLACE) 8.6-50 MG tablet    4/1/2019        Sig: Take 1 tablet by mouth 2 times daily    Class: Transitional    Route: Oral    sulfamethoxazole-trimethoprim (BACTRIM DS/SEPTRA DS) 800-160 MG tablet  10 tablet 0 4/15/2019    [place] [address] [address]    Sig: Take 1 tablet by mouth in the morning, Mon and Thur    Class: E-Prescribe    Route: Oral    tacrolimus (GENERIC EQUIVALENT) 0.5 MG capsule  30 capsule 11 4/24/2019    [facility] [id number] [place] [address]    Sig: Take ONE capsule with THREE 1 mg capsules (3.5 mg) every AM    Class: E-Prescribe    tacrolimus (GENERIC EQUIVALENT) 1 MG capsule  180 capsule 11 4/24/2019    [facility] [address] [place] [address]    Sig: Take three 1 mg caps with one 0.5 mg cap (3.5 mg) every AM and three 1 mg capsules (3 mg) every PM    Class: E-Prescribe    valGANciclovir (VALCYTE) 450 MG tablet  60 tablet 0 4/12/2019    [place] [address] [address]    Sig: Take 2 tablets (900 mg) by mouth daily    Class: E-Prescribe    Route: Oral          ROS:   Constitutional: No fever, chills, or sweats. Weight gain.   ENT: As per HPI.  Allergies/Immunologic: Negative.   Respiratory: As per HPI.  Cardiovascular: As per HPI.   GI: No nausea, vomiting, hematemesis, melena, or hematochezia.   : No urinary frequency, dysuria, or hematuria.   Integument: Negative.  "  Psychiatric: Negative.   Neuro: Negative.   Endocrinology: Negative.   Musculoskeletal: As per HPI.    Exam:  /86 (BP Location: Right arm, Patient Position: Chair, Cuff Size: Adult Regular)   Pulse 87   Ht 1.727 m (5' 8\")   Wt 60.6 kg (133 lb 8 oz)   SpO2 99%   BMI 20.30 kg/m     In general, the patient is a pleasant male in no apparent distress.    HEENT: NC/AT. PERRLA. EOMI.  Sclerae white, not injected.  No thrush noted on tongue or cheeks.  Neck:  No adenopathy, No thyromegaly.    COR: JVD at jaw when sitting upright.  RRR.  Normal S1 S2 splits physiologically.  No murmur, rub click, or gallop.    Lungs:  CTA. No rhonchi. Decreased LS throughout, no increased WOB.   Abdomen: soft, nontender, nondistended.  No organomegaly.  Extremities:  No clubbing or cyanosis, mild bilateral LE edema with pitting at ankles  Neuro: Alert & Oriented x 3, grossly non focal.  Integument: no open lesions, rashes, or jaundice.    Labs:  CBC RESULTS:   Lab Results   Component Value Date    WBC 0.7 (LL) 04/26/2019    RBC 3.11 (L) 04/26/2019    HGB 9.4 (L) 04/26/2019    HCT 30.8 (L) 04/26/2019    MCV 99 04/26/2019    MCH 30.2 04/26/2019    MCHC 30.5 (L) 04/26/2019    RDW 17.1 (H) 04/26/2019     04/26/2019       BMP RESULTS:  Lab Results   Component Value Date     04/24/2019    POTASSIUM 5.2 04/24/2019    CHLORIDE 105 04/24/2019    CO2 20 04/24/2019    ANIONGAP 11 04/24/2019    GLC 83 04/24/2019    BUN 51 (H) 04/24/2019    CR 1.70 (H) 04/24/2019    GFRESTIMATED 44 (L) 04/24/2019    GFRESTBLACK 51 (L) 04/24/2019    RADAMES 8.7 04/24/2019      LIPID RESULTS:  Lab Results   Component Value Date    CHOL 194 11/15/2018    HDL 42 11/15/2018     (H) 11/15/2018    TRIG 112 11/15/2018    NHDL 152 (H) 11/15/2018       IMMUNOSUPPRESSANT LEVELS  Lab Results   Component Value Date    TACROL 11.4 04/24/2019    DOSTAC Not Provided 04/24/2019       No components found for: CK  Lab Results   Component Value Date    MAG 1.5 " (L) 04/24/2019     Lab Results   Component Value Date    A1C 5.7 (H) 02/23/2019     Lab Results   Component Value Date    PHOS 4.1 04/24/2019     Lab Results   Component Value Date    NTBNPI 45,480 (H) 03/27/2019     Lab Results   Component Value Date    SAITESTMET SA [facility] 03/27/2019    SAICELL Class I 03/27/2019    [id number] None 03/27/2019    [id number] None 03/27/2019    SAIREPCOM  03/27/2019      Test performed by modified procedure. Serum heat inactivated and tested   by a modified ([place]) protocol including fetal calf serum addition.   High-risk, mfi >3,000. Mod-risk, mfi 500-3,000.       Lab Results   Component Value Date    SAIITESTME [facility] 03/27/2019    SAIICELL Class II 03/27/2019    [id number] None 03/27/2019    [id number] None 03/27/2019    SAIIREPCOM  03/27/2019      Test performed by modified procedure. Serum heat inactivated and tested   by a modified ([place]) protocol including fetal calf serum addition.   High-risk, mfi >3,000. Mod-risk, mfi 500-3,000.       Lab Results   Component Value Date    CSPEC Plasma, EDTA anticoagulant 04/24/2019       Assessment and Plan:  Mr. Everton Larios is 54yr old male with a history of ASD (s/p repair in childhood), AFib/AF (s/p ablation), NICM, diastolic dysfunction, alcohol abuse, Pierce's esophagus, ulcerative colitis, GIB (s/p splenic embolization), and hypothyroidism, now s/p OHT and bypass of persistent left SVC to right atrial appendage 2/24/19, who presents to clinic for routine surveillance.    NICM, s/p OHT and bypass of persistent left SVC to right atrial appendage 2/24/19  His postoperative course was c/b RV dysfunction, small pneumoperitoneum (clinically insignificant), chest wall hematoma (former ICD site, s/p evacuation and drain placement 3/31/19), HCAP/klebsiella pneumonia, and FRANKLIN (required short-term HD, now recovered).  He was transferred to  rehab 4/3, and ultimately discharged to his local residence 4/15.    His biopsy 3/25/19 showed mild AMR1  with some staining for c4d.  Repeat biopsy 3/28 showed improved AMR and less reactive capillary staining, and subsequent biopsies have now shown resolution of AMR and improved capillary staining.  Baseline coronary angiogram has been deferred due to renal function.  His last echo 3/26 showed moderate concentric wall thickening c/w LVH, with LVEF 60-65% and moderately dilated RV with mild-moderate RV dysfunction.    Labs today show stable electrolytes.  Creatinine up to 1.8.  CBC shows WBC down to 0.7 with ANC down to 0.3.  Other blood counts stable.  Tacro level pending.    RHC from this week showed RA 15, PCW 23, mPA 35, and CI 2.91.    Mr. Larios appears hypervolemic today.  Advised that he increase bumex to 2mg TID (from BID) for today, tomorrow, and Sunday.  Will repeat a BMP on Monday, and determine further diuretics at that point.  Asked that he continue to weigh himself daily, and to continue to limit salt intake.    His HRs remain stable, and his BPs remain within goal range.    As his WBC and ANC have decreased, advised the following medication changes:  - decrease MMF to 750mg twice daily  - stop valcyte -- will need to check CMV titers weekly given CMV status (+/+)  - stop bactrim -- will need to schedule pentamidine inhalation for PCP ppx, will be due q28 days  - as biopsy was negative and showed improvement in capillary staining this week, will decrease prednisone to 5mg twice daily    Will plan to repeat a CBC with diff on Monday to follow up these changes, and to determine WBC trend.  Asked that he call the on-call transplant coordinator over the weekend if he were to experience any changes in his symptoms.  Enforced that he come to [facility] ED if he were to develop any ill symptoms.        Serostatus:  - CMV D+/R+  - EBV D+/R+    Immunosuppression:  - tacrolimus, goal level 8-10.  Level from today pending.  - decreasing MMF to 750mg twice daily  - pred taper -- weaning to 5mg twice daily today given this  week's negative biopsy and improving capillary staining    Graft function:  - BPs:  Stable, remain <140/90  - HRs:  Stable, remain >80  - fluid status:  Hypervolemic, increasing bumex as noted above    PPx:  - CAV:  Aspirin 81mg daily and rosuvastatin 10mg daily  - Thrush:  Nystatin   - PCP:  D/c'ing bactrim due to creatinine and leukopenia.  Need to schedule pentamidine.  - GERD:  Omeprazole twice daily given h/o carrear's esophagus  - CMV:  Stopping valcyte due to leukopenia/neutropenia.  Will order weekly CMV titers.  - Osteoporosis:  Calcium/vitamin D supplements    Health maintenance:  - recommended OTC zyrtec or claritin for plugging in right ear.  If does not improve, recommend PCP follow up.  - increase use of incentive spirometer       Right chest wall hematoma  SVC grafting  Hematoma developed at former ICD site, and he is now s/p evacuation 3/31.  Anticoagulation held, he remains on aspirin.  SVC graft reportedly patent, so will need to monitor for increased swelling in left upper extremity.  Will review need for anticoagulation with Piyush Donis and Harsh in the future.      Leukopenia  Neutropenia  Adjusting IMS and PPx as noted above.  Asked that he go to the ED with any ill symptoms.      The above was reviewed with Mr. Larios, who verbalized understanding and will call with further questions/concerns.    45 minutes spent face-to-face with patient, with >50% in counseling and/or coordination of care as described above.      Elin Jensen, DNP, APRN, FNP-BC  Advanced Heart Failure Nurse Practitioner  [facility]  Patient Care Team:  Fredrick Garcia as PCP - General (Internal Medicine)  Jocelynn Jerez, RN as Transplant Coordinator  FREDRICK GARCIA

## 2019-04-26 NOTE — PROGRESS NOTES
SUBJECTIVE/OBJECTIVE:                           Everton Larios is a 54 year old male coming in for an initial visit for Medication Therapy Management.  He was referred to me from txp team.     Chief Complaint: Discharged 2 weeks ago from rehab from heart txp. Med changes made at txp appt today: Holding Valcyte and Bactrim due to neutropenia, taking extra 2mg of Bumex today, Pred reduced to 5mg BID.     Allergies/ADRs: Reviewed in Epic  Tobacco: No tobacco use  Alcohol: not currently using  Caffeine: 2 cups/day of coffee  Activity: Compression fracture in vertabrae. Starting Cardiac rehab soon.   PMH: Reviewed in [facility], last UC flare in 2005.    Medication Adherence/Access:  Patient using pill empty aspirin bottles to set up his medications..  Patient takes medications 4 time(s) per day. 7am, 2-3pm, 6pm, 8:30pm  Per patient, misses medication 0 times per week.   Medication barriers: none.   The patient fills medications at [facility]: NO, fills medications at [facility]  Pt seen in [facility] today: no  Patient seen in hospital by [facility]: unknown.  Patient receive supplies: no  Stools: opioids causing constipation- taking Senna   Reviewed Anti-rejection doses with bottles: no, did not bring bottles today.     Heart Transplant:  Current immunosuppressants include Prednisone 5mg BID (reduced today), TAC 3mg qAM,  3.5mg qPM.  Pt reports no side effects  Transplant date: 2/24/19  Estimated Creatinine Clearance: 39.3 mL/min (A) (based on SCr of 1.8 mg/dL (H)).  CMV prophylaxis: Valcyte stopped due to leukopenia    PCP prophylaxis: Bactrim held due to leukopenia. Starting Pentamidine.   Antifungal Prophylaxis: Nystatin until off steroids.   PPI use: omeprazole 40mg daily, some heartburn. He has history of Pierce's. More GI symptoms since starting Oxycodone again for his back pain.   Current supplements for electrolyte replacement: Mag Oxide 400mg BID currently taking with MMF.  Tx Coordinator: Jose Arreguin MD: Dr. Weinstein  clementina, Using Med Card: No, using smaller cards that fit in his wallet.   Skin Care: discussed skin care.  Immunizations: annual flu shot UTD; [id number]:  Due 6 months post txp; Prevnar 13: 2018; TDaP:  2012; Shingrix: Due 6 months post txp    Back Pain due to recent fracture: Pt is taking Oxycodone prn, APAP prn. Senna 1 tab BID for constipation.     Allergic rhinitis: Current medications include fluticasone nasal spray 1 spray(s) twice daily and Acetylcysteine prn, Montelukast 10mg daily (pt also has asthma). Primary symptoms are nasal congestion and post-nasal drip. Pt feels that current therapy is not effective.     Today's Vitals: There were no vitals taken for this visit.      ASSESSMENT:                             Current medications were reviewed today.     Medication Adherence: fair, would prefer patient use a pill box instead of his complicated system of pill bottles. Instructed patient to go downstairs and  a [place] one.     Heart Transplant: Pt to separate MMF and Magnesium by 2 hours to avoid absorption interaction.  Pt states he has been taking Immunosuppressants at 7am and 8:30pm, would emily patient to take within a 12 hour window. Pt has been taking TAC 3mg qAM and 3.5mg qPM rather than vice versa, will inform txp team.     Back Pain due to recent fracture: Stable.      Allergic rhinitis: Pt may try Cetirizine 10mg daily- see if this improves post nasal drip symptoms.    PLAN:                            Post Discharge Medication Reconciliation Status: discharge medications reconciled and changed, per note/orders (see AVS).    Txp team, FYI: patient is taking TAC 3mg qAM and 3.5mg qPM, rather than vice versa.     Pt to...  1.  pill box from pharmacy on the way out today.  2. Move Magnesium 2 hours away from MMF  3. May use Cetirizine for allergy symptoms.      I spent 40 minutes with this patient today. I offer these suggestions for consideration by txp team. A copy of the  visit note was provided to the patient's referring provider.    Will follow up in 2 months.    The patient was given a summary of these recommendations as an after visit summary.     Franko Preciado, PharmD  [facility] Pharmacist    Phone: [phone]

## 2019-04-26 NOTE — PATIENT INSTRUCTIONS
1. Stop taking valcyte and bactrim.  2. Decrease mycophenolate to 750 mg two times daily.   2. Schedule pentamidine nebulizer for Monday.   3. Decrease prednisone to 5 mg two times a day.   5. Take an extra 2 mg of bumex today, Saturday and Sunday. Call Txp Coordinator next week with update on weight/fluid status.   6. Weekly labs.   7. Labs on Monday: BMP, Mg, phos, CBC with differential.   8. Continue to use your incentive spirometer at home to help with breathing and lung expansion.    RTC on May 8 for 10-week visit.    Your Transplant Coordinator will call with pending results.  Please call your Transplant Coordinator with questions/concerns [phone].

## 2019-04-27 ENCOUNTER — TELEPHONE (OUTPATIENT)
Dept: TRANSPLANT | Facility: CLINIC | Age: 54
End: 2019-04-27

## 2019-04-27 LAB
CMV DNA SPEC NAA+PROBE-ACNC: NORMAL [IU]/ML
CMV DNA SPEC NAA+PROBE-LOG#: NORMAL {LOG_IU}/ML
SPECIMEN SOURCE: NORMAL

## 2019-04-27 NOTE — TELEPHONE ENCOUNTER
Called pt to check for any s/sx of infection with WBC 0.7. Pt didn't . LM to call with any changes; provided weekend call back #. Pt had previously instructed writer to NOT call his mom unless urgent.     FK level 9.1; goal 8-10. Including in message result and no dose change.

## 2019-04-29 ENCOUNTER — APPOINTMENT (OUTPATIENT)
Dept: GENERAL RADIOLOGY | Facility: CLINIC | Age: 54
DRG: 853 | End: 2019-04-29
Attending: EMERGENCY MEDICINE
Payer: COMMERCIAL

## 2019-04-29 ENCOUNTER — APPOINTMENT (OUTPATIENT)
Dept: CT IMAGING | Facility: CLINIC | Age: 54
DRG: 853 | End: 2019-04-29
Payer: COMMERCIAL

## 2019-04-29 ENCOUNTER — RECORDS - HEALTHEAST (OUTPATIENT)
Dept: ADMINISTRATIVE | Facility: OTHER | Age: 54
End: 2019-04-29

## 2019-04-29 ENCOUNTER — HOSPITAL ENCOUNTER (INPATIENT)
Facility: CLINIC | Age: 54
LOS: 9 days | Discharge: HOME | DRG: 853 | End: 2019-05-08
Attending: EMERGENCY MEDICINE | Admitting: INTERNAL MEDICINE
Payer: COMMERCIAL

## 2019-04-29 ENCOUNTER — AMBULATORY - HEALTHEAST (OUTPATIENT)
Dept: LAB | Facility: CLINIC | Age: 54
End: 2019-04-29

## 2019-04-29 DIAGNOSIS — A41.52 SEPSIS DUE TO PSEUDOMONAS AERUGINOSA (H): Primary | ICD-10-CM

## 2019-04-29 DIAGNOSIS — D72.9 ABNORMAL WHITE BLOOD CELL (WBC) COUNT: ICD-10-CM

## 2019-04-29 DIAGNOSIS — J15.1 PNEUMONIA DUE TO PSEUDOMONAS SPECIES, UNSPECIFIED LATERALITY, UNSPECIFIED PART OF LUNG (H): ICD-10-CM

## 2019-04-29 DIAGNOSIS — Z94.1 TRANSPLANTED HEART (H): ICD-10-CM

## 2019-04-29 DIAGNOSIS — Z94.1 HEART REPLACED BY TRANSPLANT (H): ICD-10-CM

## 2019-04-29 DIAGNOSIS — R65.10 SIRS (SYSTEMIC INFLAMMATORY RESPONSE SYNDROME) (H): ICD-10-CM

## 2019-04-29 DIAGNOSIS — Z79.899 DRUG THERAPY: ICD-10-CM

## 2019-04-29 DIAGNOSIS — R91.8 PULMONARY NODULES: ICD-10-CM

## 2019-04-29 PROBLEM — D70.9 NEUTROPENIC FEVER (H): Status: ACTIVE | Noted: 2019-04-29

## 2019-04-29 PROBLEM — R50.81 NEUTROPENIC FEVER (H): Status: ACTIVE | Noted: 2019-04-29

## 2019-04-29 LAB
ALBUMIN SERPL-MCNC: 3.5 G/DL (ref 3.4–5)
ALBUMIN UR-MCNC: 30 MG/DL
ALP SERPL-CCNC: 125 U/L (ref 40–150)
ALT SERPL W P-5'-P-CCNC: 17 U/L (ref 0–70)
ANION GAP SERPL CALCULATED.3IONS-SCNC: 12 MMOL/L (ref 3–14)
ANISOCYTOSIS BLD QL SMEAR: SLIGHT
APPEARANCE UR: CLEAR
AST SERPL W P-5'-P-CCNC: 10 U/L (ref 0–45)
BACTERIA SPEC CULT: ABNORMAL
BASOPHILS # BLD AUTO: 0 10E9/L (ref 0–0.2)
BASOPHILS NFR BLD AUTO: 4 %
BILIRUB SERPL-MCNC: 1 MG/DL (ref 0.2–1.3)
BILIRUB UR QL STRIP: NEGATIVE
BUN SERPL-MCNC: 23 MG/DL (ref 7–30)
C COLI+JEJUNI+LARI FUSA STL QL NAA+PROBE: NOT DETECTED
C DIFF TOX B STL QL: NEGATIVE
CALCIUM SERPL-MCNC: 9 MG/DL (ref 8.5–10.1)
CHLORIDE SERPL-SCNC: 94 MMOL/L (ref 94–109)
CO2 BLDCOV-SCNC: 27 MMOL/L (ref 21–28)
CO2 SERPL-SCNC: 24 MMOL/L (ref 20–32)
COLOR UR AUTO: YELLOW
CREAT SERPL-MCNC: 1.04 MG/DL (ref 0.66–1.25)
CRP SERPL-MCNC: 120 MG/L (ref 0–8)
DIFFERENTIAL METHOD BLD: ABNORMAL
EBV DNA # SPEC NAA+PROBE: NORMAL {COPIES}/ML
EBV DNA SPEC NAA+PROBE-LOG#: NORMAL {LOG_COPIES}/ML
EC STX1 GENE STL QL NAA+PROBE: NOT DETECTED
EC STX2 GENE STL QL NAA+PROBE: NOT DETECTED
ENTERIC PATHOGEN COMMENT: NORMAL
EOSINOPHIL # BLD AUTO: 0 10E9/L (ref 0–0.7)
EOSINOPHIL NFR BLD AUTO: 3 %
ERYTHROCYTE [DISTWIDTH] IN BLOOD BY AUTOMATED COUNT: 16.3 % (ref 10–15)
FLUAV+FLUBV RNA SPEC QL NAA+PROBE: NEGATIVE
FLUAV+FLUBV RNA SPEC QL NAA+PROBE: NEGATIVE
GFR SERPL CREATININE-BSD FRML MDRD: 80 ML/MIN/{1.73_M2}
GLUCOSE SERPL-MCNC: 79 MG/DL (ref 70–99)
GLUCOSE UR STRIP-MCNC: NEGATIVE MG/DL
GRAM STN SPEC: ABNORMAL
HCT VFR BLD AUTO: 35.3 % (ref 40–53)
HGB BLD-MCNC: 10.8 G/DL (ref 13.3–17.7)
HGB UR QL STRIP: NEGATIVE
INTERPRETATION ECG - MUSE: NORMAL
KETONES UR STRIP-MCNC: NEGATIVE MG/DL
LACTATE BLD-SCNC: 1.6 MMOL/L (ref 0.7–2)
LACTATE BLD-SCNC: 1.7 MMOL/L (ref 0.7–2.1)
LEUKOCYTE ESTERASE UR QL STRIP: NEGATIVE
LIPASE SERPL-CCNC: 42 U/L (ref 73–393)
LYMPHOCYTES # BLD AUTO: 0.4 10E9/L (ref 0.8–5.3)
LYMPHOCYTES NFR BLD AUTO: 62.9 %
MCH RBC QN AUTO: 29.8 PG (ref 26.5–33)
MCHC RBC AUTO-ENTMCNC: 30.6 G/DL (ref 31.5–36.5)
MCV RBC AUTO: 97 FL (ref 78–100)
MICROCYTES BLD QL SMEAR: PRESENT
MONOCYTES # BLD AUTO: 0 10E9/L (ref 0–1.3)
MONOCYTES NFR BLD AUTO: 1 %
MYELOCYTES # BLD: 0.1 10E9/L
MYELOCYTES NFR BLD MANUAL: 7.5 %
NEUTROPHILS # BLD AUTO: 0.2 10E9/L (ref 1.6–8.3)
NEUTROPHILS NFR BLD AUTO: 21.6 %
NITRATE UR QL: NEGATIVE
NOROV GI+II ORF1-ORF2 JNC STL QL NAA+PR: NOT DETECTED
PCO2 BLDV: 50 MM HG (ref 40–50)
PH BLDV: 7.34 PH (ref 7.32–7.43)
PH UR STRIP: 6 PH (ref 5–7)
PLATELET # BLD AUTO: 242 10E9/L (ref 150–450)
PO2 BLDV: 17 MM HG (ref 25–47)
POTASSIUM SERPL-SCNC: 3.5 MMOL/L (ref 3.4–5.3)
PROCALCITONIN SERPL-MCNC: 0.09 NG/ML
PROT SERPL-MCNC: 7.1 G/DL (ref 6.8–8.8)
RADIOLOGIST FLAGS: NORMAL
RBC # BLD AUTO: 3.63 10E12/L (ref 4.4–5.9)
RBC #/AREA URNS AUTO: <1 /HPF (ref 0–2)
RSV RNA SPEC NAA+PROBE: NEGATIVE
RVA NSP5 STL QL NAA+PROBE: NOT DETECTED
SALMONELLA SP RPOD STL QL NAA+PROBE: NOT DETECTED
SAO2 % BLDV FROM PO2: 21 %
SHIGELLA SP+EIEC IPAH STL QL NAA+PROBE: NOT DETECTED
SODIUM SERPL-SCNC: 131 MMOL/L (ref 133–144)
SOURCE: ABNORMAL
SP GR UR STRIP: 1.02 (ref 1–1.03)
SPECIMEN SOURCE: ABNORMAL
SPECIMEN SOURCE: ABNORMAL
SPECIMEN SOURCE: NORMAL
SPECIMEN SOURCE: NORMAL
UROBILINOGEN UR STRIP-MCNC: NORMAL MG/DL (ref 0–2)
V CHOL+PARA RFBL+TRKH+TNAA STL QL NAA+PR: NOT DETECTED
WBC # BLD AUTO: 0.7 10E9/L (ref 4–11)
WBC #/AREA URNS AUTO: <1 /HPF (ref 0–5)
Y ENTERO RECN STL QL NAA+PROBE: NOT DETECTED

## 2019-04-29 PROCEDURE — 96365 THER/PROPH/DIAG IV INF INIT: CPT | Performed by: EMERGENCY MEDICINE

## 2019-04-29 PROCEDURE — 87631 RESP VIRUS 3-5 TARGETS: CPT | Performed by: INTERNAL MEDICINE

## 2019-04-29 PROCEDURE — 86140 C-REACTIVE PROTEIN: CPT | Performed by: EMERGENCY MEDICINE

## 2019-04-29 PROCEDURE — 21400000 ZZH R&B CCU UMMC

## 2019-04-29 PROCEDURE — 25000131 ZZH RX MED GY IP 250 OP 636 PS 637: Performed by: INTERNAL MEDICINE

## 2019-04-29 PROCEDURE — 96375 TX/PRO/DX INJ NEW DRUG ADDON: CPT | Performed by: EMERGENCY MEDICINE

## 2019-04-29 PROCEDURE — 87493 C DIFF AMPLIFIED PROBE: CPT | Performed by: INTERNAL MEDICINE

## 2019-04-29 PROCEDURE — 99291 CRITICAL CARE FIRST HOUR: CPT | Mod: 25 | Performed by: EMERGENCY MEDICINE

## 2019-04-29 PROCEDURE — 36415 COLL VENOUS BLD VENIPUNCTURE: CPT | Performed by: EMERGENCY MEDICINE

## 2019-04-29 PROCEDURE — 82803 BLOOD GASES ANY COMBINATION: CPT

## 2019-04-29 PROCEDURE — 99285 EMERGENCY DEPT VISIT HI MDM: CPT | Mod: 25 | Performed by: EMERGENCY MEDICINE

## 2019-04-29 PROCEDURE — 74177 CT ABD & PELVIS W/CONTRAST: CPT

## 2019-04-29 PROCEDURE — 99292 CRITICAL CARE ADDL 30 MIN: CPT | Mod: Z6 | Performed by: EMERGENCY MEDICINE

## 2019-04-29 PROCEDURE — 87040 BLOOD CULTURE FOR BACTERIA: CPT | Performed by: EMERGENCY MEDICINE

## 2019-04-29 PROCEDURE — 25000132 ZZH RX MED GY IP 250 OP 250 PS 637: Performed by: EMERGENCY MEDICINE

## 2019-04-29 PROCEDURE — 87799 DETECT AGENT NOS DNA QUANT: CPT | Performed by: INTERNAL MEDICINE

## 2019-04-29 PROCEDURE — 25000128 H RX IP 250 OP 636: Performed by: INTERNAL MEDICINE

## 2019-04-29 PROCEDURE — 80053 COMPREHEN METABOLIC PANEL: CPT | Performed by: EMERGENCY MEDICINE

## 2019-04-29 PROCEDURE — 96361 HYDRATE IV INFUSION ADD-ON: CPT | Performed by: EMERGENCY MEDICINE

## 2019-04-29 PROCEDURE — 85025 COMPLETE CBC W/AUTO DIFF WBC: CPT | Performed by: EMERGENCY MEDICINE

## 2019-04-29 PROCEDURE — 84145 PROCALCITONIN (PCT): CPT | Performed by: EMERGENCY MEDICINE

## 2019-04-29 PROCEDURE — 71260 CT THORAX DX C+: CPT

## 2019-04-29 PROCEDURE — 93010 ELECTROCARDIOGRAM REPORT: CPT | Mod: Z6 | Performed by: EMERGENCY MEDICINE

## 2019-04-29 PROCEDURE — 87186 SC STD MICRODIL/AGAR DIL: CPT | Performed by: EMERGENCY MEDICINE

## 2019-04-29 PROCEDURE — 25000128 H RX IP 250 OP 636: Performed by: EMERGENCY MEDICINE

## 2019-04-29 PROCEDURE — 83690 ASSAY OF LIPASE: CPT | Performed by: EMERGENCY MEDICINE

## 2019-04-29 PROCEDURE — 87633 RESP VIRUS 12-25 TARGETS: CPT | Performed by: INTERNAL MEDICINE

## 2019-04-29 PROCEDURE — 25800030 ZZH RX IP 258 OP 636: Performed by: EMERGENCY MEDICINE

## 2019-04-29 PROCEDURE — 83605 ASSAY OF LACTIC ACID: CPT | Performed by: EMERGENCY MEDICINE

## 2019-04-29 PROCEDURE — 25000132 ZZH RX MED GY IP 250 OP 250 PS 637: Performed by: INTERNAL MEDICINE

## 2019-04-29 PROCEDURE — 81001 URINALYSIS AUTO W/SCOPE: CPT | Performed by: EMERGENCY MEDICINE

## 2019-04-29 PROCEDURE — 87040 BLOOD CULTURE FOR BACTERIA: CPT | Performed by: INTERNAL MEDICINE

## 2019-04-29 PROCEDURE — 71046 X-RAY EXAM CHEST 2 VIEWS: CPT

## 2019-04-29 PROCEDURE — 87800 DETECT AGNT MULT DNA DIREC: CPT | Performed by: EMERGENCY MEDICINE

## 2019-04-29 PROCEDURE — 87205 SMEAR GRAM STAIN: CPT | Performed by: INTERNAL MEDICINE

## 2019-04-29 PROCEDURE — 87077 CULTURE AEROBIC IDENTIFY: CPT | Performed by: EMERGENCY MEDICINE

## 2019-04-29 PROCEDURE — 25800025 ZZH RX 258: Performed by: INTERNAL MEDICINE

## 2019-04-29 PROCEDURE — 93005 ELECTROCARDIOGRAM TRACING: CPT | Performed by: EMERGENCY MEDICINE

## 2019-04-29 PROCEDURE — 99223 1ST HOSP IP/OBS HIGH 75: CPT | Mod: GC | Performed by: INTERNAL MEDICINE

## 2019-04-29 PROCEDURE — 87086 URINE CULTURE/COLONY COUNT: CPT | Performed by: INTERNAL MEDICINE

## 2019-04-29 PROCEDURE — 87506 IADNA-DNA/RNA PROBE TQ 6-11: CPT | Performed by: INTERNAL MEDICINE

## 2019-04-29 PROCEDURE — 83605 ASSAY OF LACTIC ACID: CPT

## 2019-04-29 RX ORDER — AZELASTINE 1 MG/ML
2 SPRAY, METERED NASAL 2 TIMES DAILY PRN
Status: DISCONTINUED | OUTPATIENT
Start: 2019-04-29 | End: 2019-05-08 | Stop reason: HOSPADM

## 2019-04-29 RX ORDER — PIPERACILLIN SODIUM, TAZOBACTAM SODIUM 3; .375 G/15ML; G/15ML
3.38 INJECTION, POWDER, LYOPHILIZED, FOR SOLUTION INTRAVENOUS EVERY 6 HOURS
Status: DISCONTINUED | OUTPATIENT
Start: 2019-04-29 | End: 2019-04-30

## 2019-04-29 RX ORDER — BENZONATATE 100 MG/1
100 CAPSULE ORAL 3 TIMES DAILY PRN
Status: DISCONTINUED | OUTPATIENT
Start: 2019-04-29 | End: 2019-05-08 | Stop reason: HOSPADM

## 2019-04-29 RX ORDER — LANOLIN ALCOHOL/MO/W.PET/CERES
6 CREAM (GRAM) TOPICAL AT BEDTIME
Status: DISCONTINUED | OUTPATIENT
Start: 2019-04-29 | End: 2019-05-08 | Stop reason: HOSPADM

## 2019-04-29 RX ORDER — ROSUVASTATIN CALCIUM 10 MG/1
10 TABLET, COATED ORAL DAILY
Status: DISCONTINUED | OUTPATIENT
Start: 2019-04-30 | End: 2019-05-08 | Stop reason: HOSPADM

## 2019-04-29 RX ORDER — PREDNISONE 5 MG/1
5 TABLET ORAL 2 TIMES DAILY WITH MEALS
Status: DISCONTINUED | OUTPATIENT
Start: 2019-04-29 | End: 2019-05-08 | Stop reason: HOSPADM

## 2019-04-29 RX ORDER — ALBUTEROL SULFATE 90 UG/1
2 AEROSOL, METERED RESPIRATORY (INHALATION) EVERY 6 HOURS PRN
Status: DISCONTINUED | OUTPATIENT
Start: 2019-04-29 | End: 2019-05-08 | Stop reason: HOSPADM

## 2019-04-29 RX ORDER — TACROLIMUS 1 MG/1
CAPSULE ORAL
Status: ON HOLD | COMMUNITY
End: 2019-05-08

## 2019-04-29 RX ORDER — IOPAMIDOL 755 MG/ML
76 INJECTION, SOLUTION INTRAVASCULAR ONCE
Status: COMPLETED | OUTPATIENT
Start: 2019-04-29 | End: 2019-04-29

## 2019-04-29 RX ORDER — TACROLIMUS 0.5 MG/1
CAPSULE ORAL
Status: ON HOLD | COMMUNITY
End: 2019-05-08

## 2019-04-29 RX ORDER — POTASSIUM CHLORIDE 750 MG/1
20-40 TABLET, EXTENDED RELEASE ORAL
Status: DISCONTINUED | OUTPATIENT
Start: 2019-04-29 | End: 2019-05-04

## 2019-04-29 RX ORDER — FLUTICASONE PROPIONATE 50 MCG
1 SPRAY, SUSPENSION (ML) NASAL 2 TIMES DAILY
Status: DISCONTINUED | OUTPATIENT
Start: 2019-04-29 | End: 2019-05-08 | Stop reason: HOSPADM

## 2019-04-29 RX ORDER — PIPERACILLIN SODIUM, TAZOBACTAM SODIUM 3; .375 G/15ML; G/15ML
3.38 INJECTION, POWDER, LYOPHILIZED, FOR SOLUTION INTRAVENOUS ONCE
Status: COMPLETED | OUTPATIENT
Start: 2019-04-29 | End: 2019-04-29

## 2019-04-29 RX ORDER — TACROLIMUS 1 MG/1
3 CAPSULE ORAL
Status: DISCONTINUED | OUTPATIENT
Start: 2019-04-30 | End: 2019-05-08 | Stop reason: HOSPADM

## 2019-04-29 RX ORDER — ACETAMINOPHEN 325 MG/1
650 TABLET ORAL EVERY 4 HOURS PRN
Status: DISCONTINUED | OUTPATIENT
Start: 2019-04-29 | End: 2019-05-08 | Stop reason: HOSPADM

## 2019-04-29 RX ORDER — VANCOMYCIN HYDROCHLORIDE 1 G/200ML
1000 INJECTION, SOLUTION INTRAVENOUS EVERY 12 HOURS
Status: DISCONTINUED | OUTPATIENT
Start: 2019-04-30 | End: 2019-04-30

## 2019-04-29 RX ORDER — CALCIUM CARBONATE 500 MG/1
500 TABLET, CHEWABLE ORAL DAILY PRN
Status: DISCONTINUED | OUTPATIENT
Start: 2019-04-29 | End: 2019-05-08 | Stop reason: HOSPADM

## 2019-04-29 RX ORDER — IPRATROPIUM BROMIDE AND ALBUTEROL SULFATE 2.5; .5 MG/3ML; MG/3ML
3 SOLUTION RESPIRATORY (INHALATION)
Status: DISCONTINUED | OUTPATIENT
Start: 2019-04-29 | End: 2019-05-04

## 2019-04-29 RX ORDER — POTASSIUM CHLORIDE 29.8 MG/ML
20 INJECTION INTRAVENOUS
Status: DISCONTINUED | OUTPATIENT
Start: 2019-04-29 | End: 2019-05-04

## 2019-04-29 RX ORDER — NYSTATIN 100000/ML
500000 SUSPENSION, ORAL (FINAL DOSE FORM) ORAL 4 TIMES DAILY
Status: DISCONTINUED | OUTPATIENT
Start: 2019-04-29 | End: 2019-05-08 | Stop reason: HOSPADM

## 2019-04-29 RX ORDER — POTASSIUM CHLORIDE 750 MG/1
40 TABLET, EXTENDED RELEASE ORAL 2 TIMES DAILY
Status: DISCONTINUED | OUTPATIENT
Start: 2019-04-29 | End: 2019-05-02

## 2019-04-29 RX ORDER — ONDANSETRON 4 MG/1
4 TABLET, ORALLY DISINTEGRATING ORAL ONCE
Status: COMPLETED | OUTPATIENT
Start: 2019-04-29 | End: 2019-04-29

## 2019-04-29 RX ORDER — SODIUM CHLORIDE 9 MG/ML
1000 INJECTION, SOLUTION INTRAVENOUS CONTINUOUS
Status: DISCONTINUED | OUTPATIENT
Start: 2019-04-29 | End: 2019-04-29

## 2019-04-29 RX ORDER — POTASSIUM CL/LIDO/0.9 % NACL 10MEQ/0.1L
10 INTRAVENOUS SOLUTION, PIGGYBACK (ML) INTRAVENOUS
Status: DISCONTINUED | OUTPATIENT
Start: 2019-04-29 | End: 2019-05-04

## 2019-04-29 RX ORDER — POTASSIUM CHLORIDE 1.5 G/1.58G
20-40 POWDER, FOR SOLUTION ORAL
Status: DISCONTINUED | OUTPATIENT
Start: 2019-04-29 | End: 2019-05-04

## 2019-04-29 RX ORDER — POTASSIUM CHLORIDE 7.45 MG/ML
10 INJECTION INTRAVENOUS
Status: DISCONTINUED | OUTPATIENT
Start: 2019-04-29 | End: 2019-05-04

## 2019-04-29 RX ORDER — MAGNESIUM OXIDE 400 MG/1
400 TABLET ORAL 2 TIMES DAILY
Status: DISCONTINUED | OUTPATIENT
Start: 2019-04-29 | End: 2019-05-08 | Stop reason: HOSPADM

## 2019-04-29 RX ORDER — ALUMINA, MAGNESIA, AND SIMETHICONE 2400; 2400; 240 MG/30ML; MG/30ML; MG/30ML
15 SUSPENSION ORAL ONCE
Status: COMPLETED | OUTPATIENT
Start: 2019-04-29 | End: 2019-04-29

## 2019-04-29 RX ORDER — BALSALAZIDE DISODIUM 750 MG/1
1500 CAPSULE ORAL 2 TIMES DAILY
Status: DISCONTINUED | OUTPATIENT
Start: 2019-04-29 | End: 2019-05-08 | Stop reason: HOSPADM

## 2019-04-29 RX ORDER — ONDANSETRON 4 MG/1
4 TABLET, FILM COATED ORAL EVERY 6 HOURS PRN
Status: DISCONTINUED | OUTPATIENT
Start: 2019-04-29 | End: 2019-05-02

## 2019-04-29 RX ORDER — MONTELUKAST SODIUM 10 MG/1
10 TABLET ORAL AT BEDTIME
Status: DISCONTINUED | OUTPATIENT
Start: 2019-04-29 | End: 2019-05-08 | Stop reason: HOSPADM

## 2019-04-29 RX ORDER — LEVOTHYROXINE SODIUM 25 UG/1
100 TABLET ORAL DAILY
Status: DISCONTINUED | OUTPATIENT
Start: 2019-04-30 | End: 2019-05-08 | Stop reason: HOSPADM

## 2019-04-29 RX ORDER — DULOXETIN HYDROCHLORIDE 20 MG/1
20 CAPSULE, DELAYED RELEASE ORAL DAILY
Status: DISCONTINUED | OUTPATIENT
Start: 2019-04-30 | End: 2019-05-08 | Stop reason: HOSPADM

## 2019-04-29 RX ORDER — SENNOSIDES A AND B 8.6 MG/1
2 TABLET, FILM COATED ORAL 2 TIMES DAILY PRN
COMMUNITY
End: 2022-11-23

## 2019-04-29 RX ORDER — ASPIRIN 81 MG/1
81 TABLET, CHEWABLE ORAL DAILY
Status: DISCONTINUED | OUTPATIENT
Start: 2019-04-29 | End: 2019-05-08 | Stop reason: HOSPADM

## 2019-04-29 RX ORDER — MAGNESIUM SULFATE HEPTAHYDRATE 40 MG/ML
4 INJECTION, SOLUTION INTRAVENOUS EVERY 4 HOURS PRN
Status: DISCONTINUED | OUTPATIENT
Start: 2019-04-29 | End: 2019-05-08 | Stop reason: HOSPADM

## 2019-04-29 RX ADMIN — VANCOMYCIN HYDROCHLORIDE 1250 MG: 10 INJECTION, POWDER, LYOPHILIZED, FOR SOLUTION INTRAVENOUS at 13:34

## 2019-04-29 RX ADMIN — NYSTATIN 500000 UNITS: 100000 SUSPENSION ORAL at 20:07

## 2019-04-29 RX ADMIN — TACROLIMUS 3.5 MG: 1 CAPSULE ORAL at 17:40

## 2019-04-29 RX ADMIN — MELATONIN TAB 3 MG 6 MG: 3 TAB at 21:12

## 2019-04-29 RX ADMIN — PREDNISONE 5 MG: 5 TABLET ORAL at 17:41

## 2019-04-29 RX ADMIN — POTASSIUM CHLORIDE 20 MEQ: 750 TABLET, EXTENDED RELEASE ORAL at 21:12

## 2019-04-29 RX ADMIN — ACETAMINOPHEN 650 MG: 325 TABLET, FILM COATED ORAL at 17:41

## 2019-04-29 RX ADMIN — NYSTATIN 500000 UNITS: 100000 SUSPENSION ORAL at 17:41

## 2019-04-29 RX ADMIN — ALUMINUM HYDROXIDE, MAGNESIUM HYDROXIDE, AND DIMETHICONE 15 ML: 400; 400; 40 SUSPENSION ORAL at 12:34

## 2019-04-29 RX ADMIN — FLUTICASONE PROPIONATE 1 SPRAY: 50 SPRAY, METERED NASAL at 20:08

## 2019-04-29 RX ADMIN — PIPERACILLIN AND TAZOBACTAM 3.38 G: 3; .375 INJECTION, POWDER, FOR SOLUTION INTRAVENOUS at 12:47

## 2019-04-29 RX ADMIN — DEXTROSE AND SODIUM CHLORIDE: 5; 450 INJECTION, SOLUTION INTRAVENOUS at 18:49

## 2019-04-29 RX ADMIN — ONDANSETRON 4 MG: 4 TABLET, ORALLY DISINTEGRATING ORAL at 13:45

## 2019-04-29 RX ADMIN — OMEPRAZOLE 40 MG: 20 CAPSULE, DELAYED RELEASE ORAL at 20:07

## 2019-04-29 RX ADMIN — IOPAMIDOL 76 ML: 755 INJECTION, SOLUTION INTRAVENOUS at 20:54

## 2019-04-29 RX ADMIN — POTASSIUM CHLORIDE 40 MEQ: 750 TABLET, EXTENDED RELEASE ORAL at 21:12

## 2019-04-29 RX ADMIN — SODIUM CHLORIDE 1000 ML: 9 INJECTION, SOLUTION INTRAVENOUS at 12:37

## 2019-04-29 RX ADMIN — SODIUM CHLORIDE 1000 ML: 9 INJECTION, SOLUTION INTRAVENOUS at 10:29

## 2019-04-29 RX ADMIN — PIPERACILLIN SODIUM,TAZOBACTAM SODIUM 3.38 G: 3; .375 INJECTION, POWDER, FOR SOLUTION INTRAVENOUS at 20:07

## 2019-04-29 RX ADMIN — ACETAMINOPHEN 650 MG: 325 TABLET, FILM COATED ORAL at 23:38

## 2019-04-29 RX ADMIN — MAGNESIUM OXIDE TAB 400 MG (241.3 MG ELEMENTAL MG) 400 MG: 400 (241.3 MG) TAB at 20:07

## 2019-04-29 RX ADMIN — BALSALAZIDE DISODIUM 1500 MG: 750 CAPSULE ORAL at 20:07

## 2019-04-29 RX ADMIN — MONTELUKAST SODIUM 10 MG: 10 TABLET, COATED ORAL at 21:12

## 2019-04-29 ASSESSMENT — ENCOUNTER SYMPTOMS
NECK STIFFNESS: 0
NAUSEA: 1
VOMITING: 1
CONSTIPATION: 1
EYE REDNESS: 0
DIFFICULTY URINATING: 0
COLOR CHANGE: 0
CHILLS: 1
CONFUSION: 0
FEVER: 1
HEADACHES: 0
DIARRHEA: 1
SHORTNESS OF BREATH: 1
ABDOMINAL PAIN: 1
WEAKNESS: 1
LIGHT-HEADEDNESS: 0
ARTHRALGIAS: 0
COUGH: 1
CHEST TIGHTNESS: 1
DIZZINESS: 0

## 2019-04-29 ASSESSMENT — MIFFLIN-ST. JEOR
SCORE: 1348.82
SCORE: 1365.15

## 2019-04-29 ASSESSMENT — ACTIVITIES OF DAILY LIVING (ADL): ADLS_ACUITY_SCORE: 14

## 2019-04-29 ASSESSMENT — PAIN DESCRIPTION - DESCRIPTORS: DESCRIPTORS: ACHING

## 2019-04-29 NOTE — Clinical Note
Potential access sites were evaluated for patency using ultrasound.   The right jugular vein was selected. Access was obtained under with Sonosite and Fluoroscopic guidance using a standard 18 guage needle with direct visualization of needle entry.

## 2019-04-29 NOTE — ED TRIAGE NOTES
"Triage Assessment & Note:    BP (!) 128/98   Pulse 120   Resp 18   Ht 1.727 m (5' 8\")   Wt 54.4 kg (120 lb)   SpO2 100%   BMI 18.25 kg/m      Patient presents with: c/o ill feeling with CP, SOB, and abd pain. PT vomited during triage. PT reports his symptoms started yesterday evening.     Home Treatments/Remedies: none    Febrile / Afebrile? Afebrile     Duration of C/o:1 day    Deacon Seymour  April 29, 2019      "

## 2019-04-29 NOTE — H&P
Cardiology History and Physical    Patient Name: Everton Larios MRN# [id number]   Age: 54 year old YOB: 1965     Date of Admission:4/29/2019  Primary care provider: Fredrick Wolff  Date of Service: 4/29/2019  Admitting Team: Cardiology 2         Chief Complaint:   Productive cough, abdominal pain, sinus congestion, chest discomfort         HPI:   Everton Larios is a 54 year old male with a history of NICM s/p OHT (2/24/2019), paroxysmal atrial fibrillation, recent T12 fracture (4/21/2019), ulcerative colitis, Pierce's esophagus, hypothyroidism, GERD, BPH and depression who is admitted for further evaluation and management of subjective fevers, URI symptoms, abdominal pain and diarrhea in the setting of neutropenia.    He is accompanied by his parents today at bedside and contributed to history.     Mr. Larios notes a few days history of sinus congestion, productive cough of yellow sputum, generalized chest discomfort, central non-radiating abdominal pain. He also reports constipation last week for which he started taking stool softeners after which he has noticed ongoing diarrhea for the past few days. Yesterday morning, he noticed severe nausea and felt feverish. Home temperatures have been around 98 degrees. This morning, he also noticed frontal aching headache without epiphora or visual changes. Denies sick contacts, recent travel or missed prophylactic antiviral/antifungal/antibacterial. His parents also reports that they have taken him out to the grocery store with face mask in place. He notes worsening shortness of breath with exertion; he reports ability to walk 100 yard prior to experiencing shortness of breath while his parents report that Mr. Larios has had difficulty with one flight of stairs. Since recent discharge from ARU 4/12/2019 for rehabilitation after hospitalization from, he has improving in terms of his functional status at home according to his parents.            Past Medical History:     Past Medical History:   Diagnosis Date     Alcohol abuse      Pierce's esophagus 10/4/2018     Chronic rhinitis 10/4/2018     Chronic systolic heart failure (H) 10/4/2018     Depression 10/4/2018     Diastolic dysfunction      DJD (degenerative joint disease) - neck 10/4/2018     H/O congenital atrial septal defect (ASD) repair at age 3 10/4/2018     Hypothyroidism due to medication 10/4/2018     ICD, [place] [employer] 2008; gen change 2/2018 10/4/2018     Intermittent asthma without complication 10/4/2018     Nonischemic cardiomyopathy (H) 10/4/2018     On amiodarone therapy 10/4/2018     Paroxysmal atrial fibrillation (H) 10/4/2018     S/P ablation of atrial fibrillation 10/4/2018     Systolic heart failure (H)      Ulcerative colitis (H) 10/4/2018     Ventricular tachycardia (H) 10/4/2018       Last Cardiac Hospitalization (2/17-4/2/2019): Cardiogenic shock requiring subclavian balloon pump with subsequent OHT (2/24/2019). Post op course complicated by RV dysfunction and FRANKLIN on HD.   ARU (4/3-4/12/2019): NICM s/p heart transplant, FRANKLIN on CKD   Last ECHO (3/26/2019):    Moderate concentric wall thickening consistent with left ventricular  hypertrophy is present.  Global and regional left ventricular function is normal with an EF of 60-65%.  Right ventricle is moderately dilated with hypertrophy and systolic  dysfunction that is slightly worse than on 3/5/19.  IVC is dilated and RA pressure estimated 15 mmHg  Last RHC (4/24/2019):   RA  A-wave: 17 mmHg  V-wave: 19 mmHg  Mean: 15 mmHg   HR: 95 bpm  RV  Systolic: 47 mmHg  End Diastolic: 17 mmHg  HR: 96 bpm    PA  Systolic:47 mmHg  Diasotlic: 25 mmHg  Mean: 35 mmHg  HR: 96 bpm      PCW  A-wave: 23 mmHg  V-wave: 30 mmHg  Mean: 23 mmHg  HR: 94 bpm  PCW sat: 98%      Cardiac Output  CO Jaden: 4.73 L/min  CI Jaden: 2.91 L/min/m2  CO TD: 4.37 L/min  CI TD: 2.68 L/min/m2    Vitals  BP: 148 mmHg / 94 mmHg  SpO2: 99 %  PA Stat: 60.8 %      Resistance  Metric  SVR:   PVR MARTIN: 4.42 MARTIN/m2  Right sided filling pressures are mildly elevated.Left sided filling pressures are moderately elevated. Mild elevated Pulmonary Hypertension.Normal cardiac output level.         Past Surgical History:     Past Surgical History:   Procedure Laterality Date     AICD, DUAL CHAMBER       CV HEART BIOPSY N/A 3/4/2019    Procedure: Heart Biopsy;  Surgeon: Abhijeet Titus MD;  Location:  HEART CARDIAC CATH LAB     CV HEART BIOPSY N/A 3/25/2019    Procedure: Heart Biopsy;  Surgeon: Yves Rodrigues MD;  Location: [facility]     CV HEART BIOPSY N/A 3/28/2019    Procedure: Heart Cath Heart Biopsy;  Surgeon: Yves Rodrigues MD;  Location:  HEART CARDIAC CATH LAB     CV HEART BIOPSY N/A 4/10/2019    Procedure: CV HEART BIOPSY;  Surgeon: Isiah Mcallister MD;  Location:  HEART CARDIAC CATH LAB     CV HEART BIOPSY N/A 4/24/2019    Procedure: CV HEART BIOPSY;  Surgeon: Isiah Mcallister MD;  Location:  HEART CARDIAC CATH LAB     CV INTRA-AORTIC BALLOON PUMP INSERTION N/A 2/18/2019    Procedure: Intra-Aortic Balloon;  Surgeon: Alton Solomon MD;  Location:  HEART CARDIAC CATH LAB     CV INTRA-AORTIC BALLOON PUMP INSERTION N/A 2/20/2019    Procedure: Replace subclavian IABP;  Surgeon: Yves Rodrigues MD;  Location:  HEART CARDIAC CATH LAB     CV LEFT HEART CATH N/A 3/19/2019    Procedure: Left Heart Cath;  Surgeon: Yves Rodrigues MD;  Location:  HEART CARDIAC CATH LAB     CV RIGHT HEART CATH N/A 3/19/2019    Procedure: RHC/HBx - Femoral access for 3/19 per Nimisha GONZALEZ;  Surgeon: Yves Rodrigues MD;  Location:  HEART CARDIAC CATH LAB     CV RIGHT HEART CATH N/A 3/25/2019    Procedure: Right Heart Cath;  Surgeon: Yves Rodrigues MD;  Location:  HEART CARDIAC CATH LAB     CV RIGHT HEART CATH N/A 3/28/2019    Procedure: Heart Cath Right Heart Cath;  Surgeon: Yves Rodrigues MD;  Location:  HEART CARDIAC CATH LAB     CV RIGHT  HEART CATH N/A 4/24/2019    Procedure: CV RIGHT HEART CATH;  Surgeon: Isiah Mcallister MD;  Location: [facility]     INCISION AND DRAINAGE CHEST WASHOUT, COMBINED N/A 3/21/2019    Procedure: Incision And Drainage; Evacuation Right Chest Wall Hematoma;  Surgeon: Dewayne House MD;  Location: [facility] OR     INSERT INTRAAORTIC BALLOON PUMP Left 2/19/2019    Procedure: Insert left  Subclavian Balloon Pump,  Removal Right femoral arterial balloom pump sheath;  Surgeon: Dewayne House MD;  Location: [facility]     INSERT INTRAAORTIC BALLOON PUMP Left 2/21/2019    Procedure: SUBCLAVIAN BALLOON PUMP PLACEMENT;  Surgeon: Ben White MD;  Location: [facility]     TRANSPLANT HEART RECIPIENT N/A 2/24/2019    Procedure: TRANSPLANT HEART RECIPIENT;  Surgeon: Dewayne House MD;  Location: [facility] OR            Social History:     Social History     Socioeconomic History     Marital status: Single     Spouse name: Not on file     Number of children: Not on file     Years of education: Not on file     Highest education level: Not on file   Occupational History     Not on file   Social Needs     Financial resource strain: Not on file     Food insecurity:     Worry: Not on file     Inability: Not on file     Transportation needs:     Medical: Not on file     Non-medical: Not on file   Tobacco Use     Smoking status: Former Smoker     Years: 10.00     Smokeless tobacco: Never Used   Substance and Sexual Activity     Alcohol use: Yes     Alcohol/week: 0.6 oz     Types: 1 Cans of beer per week     Comment: a couple times a week      Drug use: No     Sexual activity: Not on file   Lifestyle     Physical activity:     Days per week: Not on file     Minutes per session: Not on file     Stress: Not on file   Relationships     Social connections:     Talks on phone: Not on file     Gets together: Not on file     Attends Restorationist service: Not on file     Active member of club or organization: Not on file     Attends  meetings of clubs or organizations: Not on file     Relationship status: Not on file     Intimate partner violence:     Fear of current or ex partner: Not on file     Emotionally abused: Not on file     Physically abused: Not on file     Forced sexual activity: Not on file   Other Topics Concern     Not on file   Social History Narrative    Everton is a retired . He lives by himself in a townhouse in [place]. He has no lawn care responsibilities. He will be staying with his folks during his post-hospital early transplant care time. No history of TB exposures.             Family History:     Family History   Problem Relation Age of Onset     Endometrial Cancer Mother      Hypertension Father      Endometrial Cancer Maternal Grandmother             Immunizations:     Immunization History   Administered Date(s) Administered     Influenza (IIV3) PF 10/22/2008, 09/25/2009, 11/07/2011, 11/30/2012, 10/27/2013, 10/02/2014     Influenza Vaccine IM 3yrs+ 4 Valent IIV4 10/02/2015     Influenza Vaccine, 3 YRS +, IM (QUADRIVALENT W/PRESERVATIVES) 09/12/2016, 10/16/2017, 09/20/2018     OPV, trivalent, live 10/20/1978     Pneumo Conj 13-V (2010&after) 09/26/2018     TDAP Vaccine (Boostrix) 07/20/2012     Td (Adult), Adsorbed 10/20/1978              Allergies:      Allergies   Allergen Reactions     Hydromorphone Other (See Comments)     Significant Delirium     Lisinopril Other (See Comments)     hypotension     Codeine Nausea            Medications:     Prior to Admission Medications   Prescriptions Last Dose Informant Patient Reported? Taking?   DULoxetine (CYMBALTA) 20 MG EC capsule   Yes No   Sig: Take 20 mg by mouth daily   acetaminophen (TYLENOL) 325 MG tablet   No No   Sig: Take 2 tablets (650 mg) by mouth every 4 hours as needed for mild pain   albuterol (PROAIR HFA/PROVENTIL HFA/VENTOLIN HFA) 108 (90 Base) MCG/ACT inhaler  Self Yes No   Sig: Inhale 2 puffs into the lungs every 6 hours as needed for  shortness of breath / dyspnea or wheezing   aspirin (ASA) 81 MG chewable tablet   No No   Sig: Take 1 tablet (81 mg) by mouth daily   azelastine (ASTELIN) 0.1 % nasal spray   No No   Sig: Spray 2 sprays into both nostrils 2 times daily as needed for rhinitis or allergies   balsalazide (COLAZAL) 750 MG capsule   Yes No   Sig: TAKE 2 CAPSULES BY MOUTH TWICE DAILY   bumetanide (BUMEX) 2 MG tablet   No No   Sig: Take 1 tablet (2 mg) by mouth 2 times daily   calcium carbonate (TUMS) 500 MG chewable tablet   No No   Sig: Take 1 tablet (500 mg) by mouth daily as needed for heartburn   calcium carbonate 600 mg-vitamin D 400 units (CALTRATE) 600-400 MG-UNIT per tablet   No No   Sig: Take 1 tablet by mouth 2 times daily (with meals)   fluticasone (FLONASE) 50 MCG/ACT nasal spray   Yes No   Sig: Spray 1 spray into both nostrils 2 times daily   fluticasone (FLOVENT HFA) 220 MCG/ACT Inhaler   Yes No   Sig: Inhale 2 puffs into the lungs 2 times daily   levothyroxine (SYNTHROID/LEVOTHROID) 100 MCG tablet   Yes No   Sig: Take 100 mcg by mouth daily    magnesium oxide (MAG-OX) 400 MG tablet   No No   Sig: Take 1 tablet (400 mg) by mouth 2 times daily   melatonin 3 MG tablet   No No   Sig: Take 2 tablets (6 mg) by mouth At Bedtime   montelukast (SINGULAIR) 10 MG tablet  Self Yes No   Sig: Take 1 tablet (10 mg) by mouth At Bedtime   multivitamin w/minerals (THERA-VIT-M) tablet   No No   Sig: Take 1 tablet by mouth daily   mycophenolate (GENERIC EQUIVALENT) 250 MG capsule   No No   Sig: Take 3 capsules (750 mg) by mouth 2 times daily   nystatin (MYCOSTATIN) 897406 UNIT/ML suspension   No No   Sig: Take 5 mLs (500,000 Units) by mouth 4 times daily   omeprazole (PRILOSEC) 40 MG DR capsule   Yes No   Sig: Take 1 capsule (40 mg) by mouth daily   oxyCODONE (ROXICODONE) 5 MG tablet   Yes No   polyethylene glycol (MIRALAX/GLYCOLAX) packet   No No   Sig: Take 17 g by mouth daily as needed for constipation   Patient not taking: Reported on  "4/26/2019   potassium chloride ER (K-DUR/KLOR-CON M) 20 MEQ CR tablet   No No   Sig: Take 2 tablets (40 mEq) by mouth 2 times daily   predniSONE (DELTASONE) 5 MG tablet   No No   Sig: Take 5 mg by mouth 2 times daily.   rosuvastatin (CRESTOR) 10 MG tablet   No No   Sig: Take 1 tablet (10 mg) by mouth daily   senna-docusate (SENOKOT-S/PERICOLACE) 8.6-50 MG tablet   No No   Sig: Take 1 tablet by mouth 2 times daily   sulfamethoxazole-trimethoprim (BACTRIM DS/SEPTRA DS) 800-160 MG tablet   No No   Sig: ON HOLD d/t leukopenia.   Patient not taking: Reported on 4/26/2019   tacrolimus (GENERIC EQUIVALENT) 0.5 MG capsule   No No   Sig: Take 3.5 mg (three 1 mg caps with one 0.5 mg cap) in the AM and 3 mg (three 1 mg caps) in the PM.   Patient taking differently: Take 3 mg (three 1 mg caps ) in the AM and 3.5 mg (three 1 mg caps with one 0.5 mg cap) in the PM.   tacrolimus (GENERIC EQUIVALENT) 1 MG capsule   No No   Sig: Take 3.5 mg (three 1 mg caps with one 0.5 mg cap) in the AM and 3 mg (three 1 mg caps) in the PM.   Patient taking differently: Take 3 mg (three 1 mg caps) in the AM and 3.5 mg (three 1 mg caps and one 1mg cap) in the PM.   valGANciclovir (VALCYTE) 450 MG tablet   No No   Sig: ON HOLD d/t leukopenia.   Patient not taking: Reported on 4/26/2019      Facility-Administered Medications: None             Review of Systems:   A complete, 10 point ROS was performed and is negative other than what is stated in the HPI.         Physical Exam:   Blood pressure 101/69, pulse 120, temperature 98.8  F (37.1  C), temperature source Oral, resp. rate 10, height 1.727 m (5' 8\"), weight 54.4 kg (120 lb), SpO2 95 %.     Gen: lying in bed, uncomfortable  HEENT: No scleral icterus, Moist mucous membranes. No oropharyngeal erythema. Mild thrush in left lateral aspect of tongue   CV: tachycardic; no murmurs, rubs or gallops   Resp: actively coughing, coarse breath sounds bilaterally   Abdomen: Soft, tender in epigastric and RUQ, " normal active bowel sounds  Extremities: No peripheral edema, warm  Skin: sternotomy scar across torso   Neuro: awake and alert, grossly non-focal          Data:   Labs and Imaging Reviewed in Chart.     CXR (4/29/2019):  1. New nodular opacity overlying the right upper lobe, potentially  external to patient. Consider follow-up chest x-ray versus CT if  clinically indicated. Not present on recent chest x-ray and therefore  pneumonia is prime consideration.  2. Decreased right upper lobe atelectasis or infection.  3. Postoperative changes from cardiac transplantation with stable  heart size.         Assessment and Plan:   Everton Larios is a 54 year old male with a history of NICM s/p OHT (2/24/2019), paroxysmal atrial fibrillation, recent T12 fracture (4/21/2019), ulcerative colitis, Pierce's esophagus, hypothyroidism, GERD, BPH and depression who is admitted for further evaluation and management of subjective fevers, URI symptoms, abdominal pain and diarrhea in the setting of neutropenia.    #. Neutropenia   Afebrile and tachycardic to 120s on admission. Lactate wnl. . UA unremarkable. Patient has noted subjective fevers at home in additional to upper respiratory tract symptoms including productive cough of yellow sputum, chest discomfort, sinus congestion, epigastric and RUQ abdominal pain, ~5 episodes of non-bloody diarrhea in past 24 hours with stool softener use. Neutropenia likely in the setting of acute infection from respiratory or GI etiology (EBV, CMV, URI, C.diff), medication-induced (bactrim, cellcept and valcyte). Hematological or rheumatological etiologies less likely.  - Neutropenic precautions   - Transplant ID consulted; appreciate recommendations; no indication for neupogen tonight.   - Held home cellcept, valcyte and bactrim  - CT C/A/P with oral and IV contrast (if unable to tolerate PO contrast, ok to pursue scan with IV contrast only)  - Blood cx x2  - Sputum cx (re-ordered as  insufficient 1st sample per micro)  - C.diff pending  - Stool cx pending  - CMV PCR pending  - EBV PCR pending   - D5 with 1/2NS at 50cc/hr   - Antibiotics   - Vancomycin 4/29-   - Zosyn 4/29-     #. Abdominal pain  #. Non-bloody diarrhea   Lactate wnl. Pain predominantly epigastric, RUQ and LLQ on exam. Lipase wnl. Consider CMV colitis, C.diff, EBV, UC flare contributing to diarrhea and abdominal pain.   - Zofran prn   - CT C/A/P with IV and oral contrast as above   - C. Diff pending  - Stool cx pending  - CMV PCR pending  - EBV PCR pending     #. Upper respiratory symptoms  Notable productive cough of yellow sputum over the past few days, chest discomfort and sinus congestion noted. CXR with new nodular opacity overlying the right upper lobe.  - CT C/A/P with IV and oral contrast  - Respiratory viral panel   - Influenza A/B PCR  - Tessalon capsules prn   - Albuterol prn     #. NICM s/p OHT (2/24/2019) with RV dysfunction  Most recent biopsy 4/10/2019 with 1 R mild focal acute cellular rejection with no AMR. Bactrim and Valcyte held since 4/26/2019 due to leukopenia. Serostatus: CMV+, EBV+, HSV1+, HSV2 neg, VZV+  - Immunosuppresives:    - Held home cellcept   - Continue tacrolimus 3mg qAM and 3.5mg qPM   - Continue prednisone 5mg BID   - PPx: held valcyte and bactrim in setting of neutropenia. Continue nystatin.   - Diuretics: held bumex 2mg BID in setting of acute infection  - ASA 81mg daily  - Rosuvastatin 10mg daily   - Tacrolimus level in AM ordered     #. Ulcerative colitis: continue home balsalazide 1500mg BID  #. Hypothyroidism: continue levothyroxine   #. GERD and history of Pierce's esophagus: continue omeprazole   #. Depression: continue duloxetine   #. Seasonal allergies: azelastine 0.1% nasal spray, fluticasone, montelukast   #. Insomnia: continue melatonin     Diet/IVF: cardiac diet; D5 with 1/2NS @ 50cc/hr   DVT ppx: none   Disposition/Admission Status: admitted for further evaluation and management  of subjective fevers, productive cough, sinus congestion in the setting of neutropenia  CODE: Full Code     Patient was seen and discussed with Dr. Hatch.     Jackson Turner MD  Internal Medicine, PGY-2  Pager: [phone]

## 2019-04-29 NOTE — ED NOTES
[facility], [place]   ED Nurse to Floor Handoff     Everton Larios is a 54 year old male who speaks English and lives with family members,  in a home  They arrived in the ED by car from home    ED Chief Complaint: Chest Pain; Abdominal Pain; Back Pain; and Shortness of Breath    ED Dx;   Final diagnoses:   SIRS (systemic inflammatory response syndrome) (H)         Needed?: No    Allergies:   Allergies   Allergen Reactions     Hydromorphone Other (See Comments)     Significant Delirium     Lisinopril Other (See Comments)     hypotension     Codeine Nausea   .  Past Medical Hx:   Past Medical History:   Diagnosis Date     Alcohol abuse      Pierce's esophagus 10/4/2018     Chronic rhinitis 10/4/2018     Chronic systolic heart failure (H) 10/4/2018     Depression 10/4/2018     Diastolic dysfunction      DJD (degenerative joint disease) - neck 10/4/2018     H/O congenital atrial septal defect (ASD) repair at age 3 10/4/2018     Hypothyroidism due to medication 10/4/2018     ICD, 99.co 2008; gen change 2/2018 10/4/2018     Intermittent asthma without complication 10/4/2018     Nonischemic cardiomyopathy (H) 10/4/2018     On amiodarone therapy 10/4/2018     Paroxysmal atrial fibrillation (H) 10/4/2018     S/P ablation of atrial fibrillation 10/4/2018     Systolic heart failure (H)      Ulcerative colitis (H) 10/4/2018     Ventricular tachycardia (H) 10/4/2018      Baseline Mental status: WDL  Current Mental Status changes: at basesline    Infection present or suspected this encounter: yes respiratory and enteric and cultures pending  Sepsis suspected: No  Isolation type: Enteric     Activity level - Baseline/Home:  Independent  Activity Level - Current:   Independent    Bariatric equipment needed?: No    In the ED these meds were given:   Medications   0.9% sodium chloride BOLUS (0 mLs Intravenous Stopped 4/29/19 1133)     Followed by   sodium chloride 0.9%  infusion (has no administration in time range)   vancomycin (VANCOCIN) 1000 mg in dextrose 5% 200 mL PREMIX (has no administration in time range)   alum & mag hydroxide-simethicone (MYLANTA ES/MAALOX  ES) suspension 15 mL (15 mLs Oral Given 4/29/19 1234)   piperacillin-tazobactam (ZOSYN) 3.375 g vial to attach to  mL bag (0 g Intravenous Stopped 4/29/19 1328)   vancomycin 1250 mg in 0.9% NaCl 250 mL intermittent infusion 1,250 mg (1,250 mg Intravenous Given 4/29/19 1334)   0.9% sodium chloride BOLUS (0 mLs Intravenous Stopped 4/29/19 1328)   ondansetron (ZOFRAN-ODT) ODT tab 4 mg (4 mg Oral Given 4/29/19 1345)       Drips running?  Yes    Home pump  No    Current LDAs  Wound 03/26/19 Posterior Coccyx Suspected pressure ulcer blanchable erythema (Active)   Number of days: 34       Wound 03/30/19 Left Hand Skin tear (Active)   Number of days: 30       Wound 03/30/19 Right Arm Skin tear (Active)   Number of days: 30       Incision/Surgical Site 02/21/19 Midline Chest (Active)   Number of days: 67       Incision/Surgical Site 02/24/19 Left Chest (Active)   Number of days: 64       Incision/Surgical Site 03/12/19 Right Chest (Active)   Number of days: 48       Labs results:   Labs Ordered and Resulted from Time of ED Arrival Up to the Time of Departure from the ED   COMPREHENSIVE METABOLIC PANEL - Abnormal; Notable for the following components:       Result Value    Sodium 131 (*)     All other components within normal limits   CBC WITH PLATELETS DIFFERENTIAL - Abnormal; Notable for the following components:    WBC 0.7 (*)     RBC Count 3.63 (*)     Hemoglobin 10.8 (*)     Hematocrit 35.3 (*)     MCHC 30.6 (*)     RDW 16.3 (*)     Absolute Neutrophil 0.2 (*)     Absolute Lymphocytes 0.4 (*)     Absolute Myelocytes 0.1 (*)     All other components within normal limits   UA MACROSCOPIC WITH REFLEX TO MICRO AND CULTURE - Abnormal; Notable for the following components:    Protein Albumin Urine 30 (*)     All other  "components within normal limits   CRP INFLAMMATION - Abnormal; Notable for the following components:    CRP Inflammation 120.0 (*)     All other components within normal limits   LIPASE - Abnormal; Notable for the following components:    Lipase 42 (*)     All other components within normal limits   ISTAT  GASES LACTATE SOPHIA POCT - Abnormal; Notable for the following components:    PO2 Venous 17 (*)     All other components within normal limits   LACTIC ACID WHOLE BLOOD   PROCALCITONIN   CMV DNA QUANTIFICATION   EBV DNA PCR QUANTITATIVE WHOLE BLOOD   ISTAT CG4 GASES LACTATE SOPHIA NURSING POCT   BLOOD CULTURE   BLOOD CULTURE   SPUTUM CULTURE AEROBIC BACTERIAL   GRAM STAIN   CLOSTRIDIUM DIFFICILE TOXIN B   ENTERIC BACTERIA AND VIRUS PANEL BY STEFANY STOOL       Imaging Studies:   Recent Results (from the past 24 hour(s))   XR Chest 2 Views    Narrative    XR CHEST 2 VW  4/29/2019 12:34 PM      HISTORY: Chest pain    COMPARISON: 4/6/2019, 3/22/2019, 3/15/2019    FINDINGS: PA and lateral views of the chest. Postoperative changes  from cardiac transplantation. Stable heart size. Unchanged metallic  hyperdensity SVC. New nodular opacity overlying the right upper lobe.  Slightly decreased streaky right upper lobe opacities. No pneumothorax  or substantial pleural fluid.      Impression    IMPRESSION:   1. New nodular opacity overlying the right upper lobe, potentially  external to patient. Consider further workup with CT if clinically  indicated.  2. Decreased right upper lobe atelectasis or infection.  3. Postoperative changes from cardiac transplantation with stable  heart size.       Recent vital signs:   /81   Pulse 120   Temp 98.8  F (37.1  C) (Oral)   Resp 20   Ht 1.727 m (5' 8\")   Wt 54.4 kg (120 lb)   SpO2 99%   BMI 18.25 kg/m      Dalia Coma Scale Score: 15 (04/29/19 0837)       Cardiac Rhythm: Tachycardia  Pt needs tele? Yes  Skin/wound Issues: None    Code Status: Full Code    Pain control: pt had " none    Nausea control: fair    Abnormal labs/tests/findings requiring intervention: NS bolus given. Zosyn and Vanco given. Zofran given for nausea.  Maalox given for indigestion.    Family present during ED course? Yes   Family Comments/Social Situation comments: parents present.     Tasks needing completion: None    Margi Lindo, RN  [phone] [facility]

## 2019-04-29 NOTE — ED PROVIDER NOTES
"  History     Chief Complaint   Patient presents with     Chest Pain     Abdominal Pain     Back Pain     Shortness of Breath     The history is provided by the patient and medical records.     Everton Larios is a 54 year old male with a history of congenital atrial septal defect (ASD), nonischemic cardiomyopathy, paroxysmal atrial fibrillation, ulcerative colitis, chronic systolic heart failure, heart transplant (02/24/2019), L1 compression fracture (04/21/2019), TMJ, and DJD, who presents to the Emergency Department for evaluation of worsening, constant, \"crampy\" abdominal pain, as well as, constant, \"dull\", diffuse chest pain, both of which began 04/27/2019 and worsens with coughing. He also complains of chills, productive cough (runny phlegm), chest tightness, subjective fever, mild shortness of breath, weakness, diarrhea (taking stool softeners to address constipation), nausea, emesis (x1 episode), constipation and knee joint pain. The patient denies lightheadedness, dizziness, syncope, or gait instability. He endorses compliance to medications. Of note, the patient has not taken any oxy for the past three days due to his constipation.    I have reviewed the Medications, Allergies, Past Medical and Surgical History, and Social History in the [employer] system.    Past Medical History:   Diagnosis Date     Alcohol abuse      Pierce's esophagus 10/4/2018     Chronic rhinitis 10/4/2018     Chronic systolic heart failure (H) 10/4/2018     Depression 10/4/2018     Diastolic dysfunction      DJD (degenerative joint disease) - neck 10/4/2018     H/O congenital atrial septal defect (ASD) repair at age 3 10/4/2018     Hypothyroidism due to medication 10/4/2018     ICD, [employer] scientific 2008; gen change 2/2018 10/4/2018     Intermittent asthma without complication 10/4/2018     Nonischemic cardiomyopathy (H) 10/4/2018     On amiodarone therapy 10/4/2018     Paroxysmal atrial fibrillation (H) 10/4/2018     S/P ablation " of atrial fibrillation 10/4/2018     Systolic heart failure (H)      Ulcerative colitis (H) 10/4/2018     Ventricular tachycardia (H) 10/4/2018       Past Surgical History:   Procedure Laterality Date     AICD, DUAL CHAMBER       CV HEART BIOPSY N/A 3/4/2019    Procedure: Heart Biopsy;  Surgeon: Abhijeet Titus MD;  Location:  HEART CARDIAC CATH LAB     CV HEART BIOPSY N/A 3/25/2019    Procedure: Heart Biopsy;  Surgeon: Yves Rodrigues MD;  Location: [facility] HEART [facility]ARD[facility]AC [facility] LAB     CV HEART BIOPSY N/A 3/28/2019    Procedure: Heart Cath Heart Biopsy;  Surgeon: Yves Rodrigues MD;  [facility]     CV HEART BIOPSY N/A 4/10/2019    Procedure: CV HEART BIOPSY;  Surgeon: Isiah Mcallister MD;  Location:  HEART CARDIAC CATH LAB     CV HEART BIOPSY N/A 4/24/2019    Procedure: CV HEART BIOPSY;  Surgeon: Isiah Mcallister MD;  Location:  HEART CARDIAC CATH LAB     CV INTRA-AORTIC BALLOON PUMP INSERTION N/A 2/18/2019    Procedure: Intra-Aortic Balloon;  Surgeon: Alton Solomon MD;  Location:  HEART CARDIAC CATH LAB     CV INTRA-AORTIC BALLOON PUMP INSERTION N/A 2/20/2019    Procedure: Replace subclavian IABP;  Surgeon: Yves Rodrigues MD;  Location:  HEART CARDIAC CATH LAB     CV LEFT HEART CATH N/A 3/19/2019    Procedure: Left Heart Cath;  Surgeon: Yves Rodrigues MD;  Location:  HEART CARDIAC CATH LAB     CV RIGHT HEART CATH N/A 3/19/2019    Procedure: RHC/HBx - Femoral access for 3/19 per Nimisha GONZALEZ;  Surgeon: Yves Rodrigues MD;  Location:  HEART CARDIAC CATH LAB     CV RIGHT HEART CATH N/A 3/25/2019    Procedure: Right Heart Cath;  Surgeon: Yves Rodrigues MD;  Location:  HEART CARDIAC CATH LAB     CV RIGHT HEART CATH N/A 3/28/2019    Procedure: Heart Cath Right Heart Cath;  Surgeon: Yves Rodrigues MD;  Location:  HEART CARDIAC CATH LAB     CV RIGHT HEART CATH N/A 4/24/2019    Procedure: CV RIGHT HEART CATH;  Surgeon: Isiah Mcallister  MD;  Location:  HEART CARDIAC CATH LAB     CV RIGHT HEART CATH N/A 3/11/2019    Procedure: ADD ON RHC/HBX;  Surgeon: Alton Solomon MD;  Location:  HEART CARDIAC CATH LAB     INCISION AND DRAINAGE CHEST WASHOUT, COMBINED N/A 3/21/2019    Procedure: Incision And Drainage; Evacuation Right Chest Wall Hematoma;  Surgeon: Dewayne House MD;  Location: [facility] OR     INSERT INTRAAORTIC BALLOON PUMP Left 2/19/2019    Procedure: Insert left  Subclavian Balloon Pump,  Removal Right femoral arterial balloom pump sheath;  Surgeon: Dewayne House MD;  Location: [facility]     INSERT INTRAAORTIC BALLOON PUMP Left 2/21/2019    Procedure: SUBCLAVIAN BALLOON PUMP PLACEMENT;  Surgeon: Ben White MD;  Location: [facility]     TRANSPLANT HEART RECIPIENT N/A 2/24/2019    Procedure: TRANSPLANT HEART RECIPIENT;  Surgeon: Dewayne House MD;  Location: [facility] OR       Family History   Problem Relation Age of Onset     Endometrial Cancer Mother      Hypertension Father      Endometrial Cancer Maternal Grandmother        Social History     Tobacco Use     Smoking status: Former Smoker     Years: 10.00     Smokeless tobacco: Never Used   Substance Use Topics     Alcohol use: Yes     Alcohol/week: 0.6 oz     Types: 1 Cans of beer per week     Comment: a couple times a week        Current Facility-Administered Medications   Medication     sodium chloride 0.9% infusion     [START ON 4/30/2019] vancomycin (VANCOCIN) 1000 mg in dextrose 5% 200 mL PREMIX     Current Outpatient Medications   Medication     acetaminophen (TYLENOL) 325 MG tablet     albuterol (PROAIR HFA/PROVENTIL HFA/VENTOLIN HFA) 108 (90 Base) MCG/ACT inhaler     aspirin (ASA) 81 MG chewable tablet     balsalazide (COLAZAL) 750 MG capsule     bumetanide (BUMEX) 2 MG tablet     calcium carbonate (TUMS) 500 MG chewable tablet     calcium carbonate 600 mg-vitamin D 400 units (CALTRATE) 600-400 MG-UNIT per tablet     DULoxetine (CYMBALTA) 20 MG EC capsule      fluticasone (FLONASE) 50 MCG/ACT nasal spray     fluticasone (FLOVENT HFA) 220 MCG/ACT Inhaler     levothyroxine (SYNTHROID/LEVOTHROID) 100 MCG tablet     magnesium oxide (MAG-OX) 400 MG tablet     melatonin 3 MG tablet     montelukast (SINGULAIR) 10 MG tablet     multivitamin w/minerals (THERA-VIT-M) tablet     mycophenolate (GENERIC EQUIVALENT) 250 MG capsule     nystatin (MYCOSTATIN) 563540 UNIT/ML suspension     omeprazole (PRILOSEC) 40 MG DR capsule     potassium chloride ER (K-DUR/KLOR-CON M) 20 MEQ CR tablet     predniSONE (DELTASONE) 5 MG tablet     rosuvastatin (CRESTOR) 10 MG tablet     senna (SENOKOT) 8.6 MG tablet     tacrolimus (GENERIC EQUIVALENT) 0.5 MG capsule     tacrolimus (GENERIC EQUIVALENT) 1 MG capsule     sulfamethoxazole-trimethoprim (BACTRIM DS/SEPTRA DS) 800-160 MG tablet     valGANciclovir (VALCYTE) 450 MG tablet        Allergies   Allergen Reactions     Hydromorphone Other (See Comments)     Significant Delirium     Lisinopril Other (See Comments)     hypotension     Codeine Nausea       Review of Systems   Constitutional: Positive for chills and fever (subjective).   HENT: Negative for congestion.    Eyes: Negative for redness.   Respiratory: Positive for cough (productive), chest tightness and shortness of breath (mild).    Cardiovascular: Positive for chest pain (diffuse).   Gastrointestinal: Positive for abdominal pain, constipation, diarrhea, nausea and vomiting.   Genitourinary: Negative for difficulty urinating.   Musculoskeletal: Negative for arthralgias, gait problem and neck stiffness.        Positive for knee joint pain   Skin: Negative for color change.   Neurological: Positive for weakness. Negative for dizziness, syncope, light-headedness and headaches.   Psychiatric/Behavioral: Negative for confusion.   All other systems reviewed and are negative.      Physical Exam   BP: (!) 128/98  Pulse: 120  Heart Rate: 115  Temp: 98.8  F (37.1  C)  Resp: 18  Height: 172.7 cm (5'  "8\")  Weight: 54.4 kg (120 lb)  SpO2: 100 %      Physical Exam   Constitutional: No distress.   HENT:   Head: Atraumatic.   Mouth/Throat: Oropharynx is clear and moist.   Eyes: Pupils are equal, round, and reactive to light. No scleral icterus.   Cardiovascular: Normal heart sounds and intact distal pulses. Tachycardia present.   Pulmonary/Chest: Breath sounds normal. No respiratory distress.   Abdominal: Soft. Bowel sounds are normal. There is no tenderness.   Musculoskeletal: He exhibits no edema or tenderness.   Skin: Skin is warm. No rash noted. He is not diaphoretic.       ED Course   9:01 AM  The patient was seen and examined by Dr. Bennett in Room ED15.        Procedures             EKG Interpretation:      Interpreted by Alec Bennett  Time reviewed: 9:02 AM  Symptoms at time of EKG: Malaise  Rhythm: sinus tachycardia  Rate: Tachycardia  Axis: Left Axis Deviation  Ectopy: none  Conduction: right bundle branch block (incomplete)  ST Segments/ T Waves: No ST-T wave changes  Q Waves: III and aVf  Comparison to prior: Unchanged from March 11, 2018 other than sinus tachycardia is no present    Clinical Impression: sinus tachycardia    Results for orders placed or performed during the hospital encounter of 04/29/19   XR Chest 2 Views    Narrative    XR CHEST 2 VW  4/29/2019 12:34 PM      HISTORY: Chest pain    COMPARISON: 4/6/2019, 3/22/2019, 3/15/2019    FINDINGS: PA and lateral views of the chest. Postoperative changes  from cardiac transplantation. Stable heart size. Unchanged metallic  hyperdensity SVC. New nodular opacity overlying the right upper lobe.  Slightly decreased streaky right upper lobe opacities. No pneumothorax  or substantial pleural fluid.      Impression    IMPRESSION:   1. New nodular opacity overlying the right upper lobe, potentially  external to patient. Consider further workup with CT if clinically  indicated.  2. Decreased right upper lobe atelectasis or infection.  3. Postoperative changes " from cardiac transplantation with stable  heart size.   Lactic acid whole blood   Result Value Ref Range    Lactic Acid 1.6 0.7 - 2.0 mmol/L   Comprehensive metabolic panel   Result Value Ref Range    Sodium 131 (L) 133 - 144 mmol/L    Potassium 3.5 3.4 - 5.3 mmol/L    Chloride 94 94 - 109 mmol/L    Carbon Dioxide 24 20 - 32 mmol/L    Anion Gap 12 3 - 14 mmol/L    Glucose 79 70 - 99 mg/dL    Urea Nitrogen 23 7 - 30 mg/dL    Creatinine 1.04 0.66 - 1.25 mg/dL    GFR Estimate 80 >60 mL/min/[1.73_m2]    GFR Estimate If Black >90 >60 mL/min/[1.73_m2]    Calcium 9.0 8.5 - 10.1 mg/dL    Bilirubin Total 1.0 0.2 - 1.3 mg/dL    Albumin 3.5 3.4 - 5.0 g/dL    Protein Total 7.1 6.8 - 8.8 g/dL    Alkaline Phosphatase 125 40 - 150 U/L    ALT 17 0 - 70 U/L    AST 10 0 - 45 U/L   CBC with platelets differential   Result Value Ref Range    WBC 0.7 (LL) 4.0 - 11.0 10e9/L    RBC Count 3.63 (L) 4.4 - 5.9 10e12/L    Hemoglobin 10.8 (L) 13.3 - 17.7 g/dL    Hematocrit 35.3 (L) 40.0 - 53.0 %    MCV 97 78 - 100 fl    MCH 29.8 26.5 - 33.0 pg    MCHC 30.6 (L) 31.5 - 36.5 g/dL    RDW 16.3 (H) 10.0 - 15.0 %    Platelet Count 242 150 - 450 10e9/L    Diff Method Manual Differential     % Neutrophils 21.6 %    % Lymphocytes 62.9 %    % Monocytes 1.0 %    % Eosinophils 3.0 %    % Basophils 4.0 %    % Myelocytes 7.5 %    Absolute Neutrophil 0.2 (LL) 1.6 - 8.3 10e9/L    Absolute Lymphocytes 0.4 (L) 0.8 - 5.3 10e9/L    Absolute Monocytes 0.0 0.0 - 1.3 10e9/L    Absolute Eosinophils 0.0 0.0 - 0.7 10e9/L    Absolute Basophils 0.0 0.0 - 0.2 10e9/L    Absolute Myelocytes 0.1 (H) 0 10e9/L    Anisocytosis Slight     Microcytes Present    UA reflex to Microscopic and Culture   Result Value Ref Range    Color Urine Yellow     Appearance Urine Clear     Glucose Urine Negative NEG^Negative mg/dL    Bilirubin Urine Negative NEG^Negative    Ketones Urine Negative NEG^Negative mg/dL    Specific Gravity Urine 1.016 1.003 - 1.035    Blood Urine Negative NEG^Negative     pH Urine 6.0 5.0 - 7.0 pH    Protein Albumin Urine 30 (A) NEG^Negative mg/dL    Urobilinogen mg/dL Normal 0.0 - 2.0 mg/dL    Nitrite Urine Negative NEG^Negative    Leukocyte Esterase Urine Negative NEG^Negative    Source Midstream Urine     RBC Urine <1 0 - 2 /HPF    WBC Urine <1 0 - 5 /HPF   Procalcitonin   Result Value Ref Range    Procalcitonin 0.09 ng/ml   CRP inflammation   Result Value Ref Range    CRP Inflammation 120.0 (H) 0.0 - 8.0 mg/L   Lipase   Result Value Ref Range    Lipase 42 (L) 73 - 393 U/L   EKG 12-lead, tracing only   Result Value Ref Range    Interpretation ECG Click View Image link to view waveform and result    ISTAT gases lactate tobi POCT   Result Value Ref Range    Ph Venous 7.34 7.32 - 7.43 pH    PCO2 Venous 50 40 - 50 mm Hg    PO2 Venous 17 (L) 25 - 47 mm Hg    Bicarbonate Venous 27 21 - 28 mmol/L    O2 Sat Venous 21 %    Lactic Acid 1.7 0.7 - 2.1 mmol/L   Blood culture   Result Value Ref Range    Specimen Description Blood Left Arm     Special Requests Received in aerobic bottle only     Culture Micro PENDING    Blood culture   Result Value Ref Range    Specimen Description Blood Right Arm     Special Requests Received in aerobic bottle only     Culture Micro No growth after 1 hour      Medications   0.9% sodium chloride BOLUS (0 mLs Intravenous Stopped 4/29/19 1133)     Followed by   sodium chloride 0.9% infusion (has no administration in time range)   vancomycin (VANCOCIN) 1000 mg in dextrose 5% 200 mL PREMIX (has no administration in time range)   alum & mag hydroxide-simethicone (MYLANTA ES/MAALOX  ES) suspension 15 mL (15 mLs Oral Given 4/29/19 1234)   piperacillin-tazobactam (ZOSYN) 3.375 g vial to attach to  mL bag (0 g Intravenous Stopped 4/29/19 1328)   vancomycin 1250 mg in 0.9% NaCl 250 mL intermittent infusion 1,250 mg (1,250 mg Intravenous Given 4/29/19 1334)   0.9% sodium chloride BOLUS (0 mLs Intravenous Stopped 4/29/19 1328)   ondansetron (ZOFRAN-ODT) ODT tab 4 mg  (4 mg Oral Given 4/29/19 1345)                 Critical Care time:  none             Labs Ordered and Resulted from Time of ED Arrival Up to the Time of Departure from the ED   COMPREHENSIVE METABOLIC PANEL - Abnormal; Notable for the following components:       Result Value    Sodium 131 (*)     All other components within normal limits   CBC WITH PLATELETS DIFFERENTIAL - Abnormal; Notable for the following components:    WBC 0.7 (*)     RBC Count 3.63 (*)     Hemoglobin 10.8 (*)     Hematocrit 35.3 (*)     MCHC 30.6 (*)     RDW 16.3 (*)     Absolute Neutrophil 0.2 (*)     Absolute Lymphocytes 0.4 (*)     Absolute Myelocytes 0.1 (*)     All other components within normal limits   UA MACROSCOPIC WITH REFLEX TO MICRO AND CULTURE - Abnormal; Notable for the following components:    Protein Albumin Urine 30 (*)     All other components within normal limits   CRP INFLAMMATION - Abnormal; Notable for the following components:    CRP Inflammation 120.0 (*)     All other components within normal limits   LIPASE - Abnormal; Notable for the following components:    Lipase 42 (*)     All other components within normal limits   ISTAT  GASES LACTATE SOPHIA POCT - Abnormal; Notable for the following components:    PO2 Venous 17 (*)     All other components within normal limits   LACTIC ACID WHOLE BLOOD   PROCALCITONIN   CMV DNA QUANTIFICATION   EBV DNA PCR QUANTITATIVE WHOLE BLOOD   ISTAT CG4 GASES LACTATE SOPHIA NURSING POCT   BLOOD CULTURE   BLOOD CULTURE   SPUTUM CULTURE AEROBIC BACTERIAL   GRAM STAIN   CLOSTRIDIUM DIFFICILE TOXIN B   ENTERIC BACTERIA AND VIRUS PANEL BY STEFANY STOOL          Critical Care time was 75 minutes for this patient excluding procedures including review of old records, bedside management, discussion with healthcare providers and documentation.      Assessments & Plan (with Medical Decision Making)   54-year-old male who is 2 months approximately status post heart transplant who presents with malaise, nausea,  cough in the setting of recently discovered neutropenia.  Initial vital signs revealed tachycardia.  Chest was clear to auscultation.  Differential included SIRS, sepsis, arrhythmia, rejection, medication side effect, withdrawal.  Laboratories were obtained revealing again neutropenia with a white blood count of 0.7.  CRP was elevated at 120.  Urinalysis was clear.  Procalcitonin was 0.09.  Lipase and comprehensive metabolic panel were normal with exception of slightly low sodium of 131.  Blood cultures were obtained as well as chest x-ray revealing possible right upper lobe infection.  Patient was treated with broad-spectrum antibiotics including Zosyn and vancomycin.  In addition, he was given IV fluids.  The case was discussed with the cardiology to staff who is agreed to accept the patient is an admission.    I have reviewed the nursing notes.    I have reviewed the findings, diagnosis, plan and need for follow up with the patient.       Medication List      There are no discharge medications for this visit.         Final diagnoses:   SIRS (systemic inflammatory response syndrome) (H)   Fernie AGUDELO, am serving as a trained medical scribe to document services personally performed by Samm Bennett MD, based on the provider's statements to me.      Samm AGUDELO MD, was physically present and have reviewed and verified the accuracy of this note documented by Fernie Mcdaniel.     4/29/2019   [facility], [place], EMERGENCY DEPARTMENT     Alec Bennett MD  04/29/19 1600

## 2019-04-29 NOTE — PHARMACY-VANCOMYCIN DOSING SERVICE
Pharmacy Vancomycin Initial Note  Date of Service 2019  Patient's  1965  54 year old, male    Indication: Sepsis    Current estimated CrCl = Estimated Creatinine Clearance: 61 mL/min (based on SCr of 1.04 mg/dL).    Creatinine for last 3 days  2019:  9:11 AM Creatinine 1.04 mg/dL    Recent Vancomycin Level(s) for last 3 days  No results found for requested labs within last 72 hours.      Vancomycin IV Administrations (past 72 hours)      No vancomycin orders with administrations in past 72 hours.                Nephrotoxins and other renal medications (From now, onward)    Start     Dose/Rate Route Frequency Ordered Stop    19 1206  vancomycin 1250 mg in 0.9% NaCl 250 mL intermittent infusion 1,250 mg      1,250 mg  over 90 Minutes Intravenous ONCE 19 1205      19 1200  piperacillin-tazobactam (ZOSYN) 3.375 g vial to attach to  mL bag      3.375 g  over 30 Minutes Intravenous ONCE 19 1159            Contrast Orders - past 72 hours (72h ago, onward)    None                Plan:  1.  Start vancomycin  1250 mg(23mg/kg) IV LOAD followed by 1000mg (18mg/kg) IV q12h.   2.  Goal Trough Level: 15-20 mg/L   3.  Pharmacy will check trough levels as appropriate in 1-3 Days.    4. Serum creatinine levels will be ordered daily for the first week of therapy and at least twice weekly for subsequent weeks.    5. [place] method utilized to dose vancomycin therapy: Method 2    Karthik Soliman PharmD  PGY1 Resident

## 2019-04-29 NOTE — LETTER
Transition Communication Hand-off for Care Transitions to Next Level of Care Provider    Name: Everton Larios  : 1965  MRN #: [id number]  Primary Care Provider: KALYN GARCIA     Primary Clinic: [address] [address]     Reason for Hospitalization:  SIRS (systemic inflammatory response syndrome) (H) [R65.10]  Admit Date/Time: 2019  8:36 AM  Discharge Date: 19  Payor Source: Payor: [facility] / Plan: [facility] MEDICARE / Product Type: HMO   Readmission Assessment Measure (RAPHAEL) Risk Score/category: high  Reason for Communication Hand-off Referral: Multiple providers/specialties  Discharge Plan:  Discharge Needs Assessment:  Needs      Most Recent Value   Equipment Currently Used at Home  cane, straight      Follow-up specialty is recommended: Yes    Follow-up plan:    Future Appointments                 2019  9:00 AM UU LAB GOLD WAITING [facility] O   2019  9:30 AM [id number] [facility] O   2019 11:00 AM U2A ROOM [address] [place] [address]   2019  9:30 AM Elin Jensen NP [facility]   2019  2:30 PM Ric Shukla MD [facility]   2019 10:00 AM UU LAB GOLD WAITING [facility] O   2019 10:30 AM U2A ROOM 3 [facility] O   2019  2:30 PM Elin Jensen NP [facility]   2019 10:00 AM UU LAB GOLD WAITING [facility] O   2019 10:30 AM U2A ROOM [address] [place] [address] O   2019  2:00 PM Franko Preciado, [facility]   2019  3:00 PM Corinna Donis MD [facility]   2019  2:30 PM Elin Jensen NP [facility]       Any outstanding tests or procedures:        Radiology & Cardiology Orders     Future Labs/Procedures Complete By Expires    CT Chest w & w/o contrast  2019 (Approximate) 2019    Process Instructions:    Administration of IV contrast (contrast agent, dose, and amount) will be tailored to this examination per the appropriate written protocol listed in  the Protocol E-Book, or by the supervising imaging provider.     Comments:    Need to have before ID appointment on 5/28   Range between 5/24 and 5/27 pending availability        Referrals     Future Labs/Procedures    Medication Therapy Management Referral     Comments:    MTM referral reason            Patient had a hospital or ED visit in last 6 months and has more than 10   PTA or Discharge medications    Patient has 5 PTA or Discharge Medications AND one of the following   diagnoses: DM,HF,COPD,AMI DX,PULM HTN       This service is designed to help you get the most from your medications.  A specially trained pharmacist will work closely with you and your doctors  to solve any problems related to your medications and to help you get the   best results from taking them.      The Medication Therapy Management staff will call you to schedule an appointment.            Key Recommendations:  Post hospitalization follow up.     MATT ADRIAN RN BSN  Care Coordinator Unit [address] [place] [address]  Phone: [phone]

## 2019-04-29 NOTE — Clinical Note
dry, intact, no bleeding and no hematoma. 7fr RIJ removed, manual pressure applied, hemostasis achieved, bandage placed.

## 2019-04-29 NOTE — PHARMACY-ADMISSION MEDICATION HISTORY
Admission Medication History status for the 2019 admission is complete.  See EPIC admission navigator for Prior to Admission medications.  Patient's Name: Everton Larios  Patient's : 1965   54 year old, male    Medication history sources: patient, [facility] pharmacy fill history, [place] fill history  Primary Pharmacy: [facility] in [place], [place] ([phone])    Medication history source reliability: Good  Medication adherence:  Good    Changes made to PTA medication list (reason)  Added: none  Deleted: azelastine (no longer taking), oxycodone (no longer taking), polyethylene glycol (no longer taking)  Changed:   1. Tacrolimus 0.5 mg capsules: take 3.5 mg (three 1 mg caps with one 0.5 mg caps) in the AM and 3 mg (three 1 mg caps) in the PM --> take one 0.5 mg capsule by mouth every evening along with 3 mg for a total dose of 3.5 mg  2. Tacrolimus 1 mg capsules: take 3.5 mg (three 1 mg caps with one 0.5 mg caps) in the AM and 3 mg (three 1 mg caps) in the PM --> take 3 mg by mouth in the morning and take 3.5 mg (3 x 1 mg + 1 x 0.5 mg) in the evening    Additional medication history information (including reliability of information, actions taken by pharmacist):   1. Per note on 19, patient was to start pentamidine for PCP prophylaxis. Patient has not yet received. Bactrim and Valcyte have been placed on hold since 19 due to leukopenia.  2. Regarding tacrolimus therapy: patient reported taking his 3 mg AM dose today (19).    Time spent in this activity: 45 minutes    Medication history completed by:  Yazan Hinton, Pharmacy Student     Prior to Admission medications    Medication Sig Last Dose Taking? Auth Provider   acetaminophen (TYLENOL) 325 MG tablet Take 2 tablets (650 mg) by mouth every 4 hours as needed for mild pain 2019 at AM Yes Arcadio Blackburn PA   albuterol (PROAIR HFA/PROVENTIL HFA/VENTOLIN HFA) 108 (90 Base) MCG/ACT inhaler Inhale 2 puffs into the lungs  every 6 hours as needed for shortness of breath / dyspnea or wheezing Past Month at Unknown time Yes    aspirin (ASA) 81 MG chewable tablet Take 1 tablet (81 mg) by mouth daily 4/29/2019 at AM Yes Jose Casarez PA-C   balsalazide (COLAZAL) 750 MG capsule TAKE 2 CAPSULES BY MOUTH TWICE DAILY 4/29/2019 at AM Yes Reported, Patient   bumetanide (BUMEX) 2 MG tablet Take 1 tablet (2 mg) by mouth 2 times daily 4/29/2019 at AM Yes Ann Marie Torres PA   calcium carbonate (TUMS) 500 MG chewable tablet Take 1 tablet (500 mg) by mouth daily as needed for heartburn Past Month at Unknown time Yes Arcadio Blackburn PA   calcium carbonate 600 mg-vitamin D 400 units (CALTRATE) 600-400 MG-UNIT per tablet Take 1 tablet by mouth 2 times daily (with meals) 4/29/2019 at AM Yes Arcadio Blackburn PA   DULoxetine (CYMBALTA) 20 MG EC capsule Take 20 mg by mouth daily 4/29/2019 at AM Yes Unknown, Entered By History   fluticasone (FLONASE) 50 MCG/ACT nasal spray Spray 1 spray into both nostrils 2 times daily 4/28/2019 at PM Yes Reported, Patient   fluticasone (FLOVENT HFA) 220 MCG/ACT Inhaler Inhale 2 puffs into the lungs 2 times daily 4/29/2019 at AM Yes Unknown, Entered By History   levothyroxine (SYNTHROID/LEVOTHROID) 100 MCG tablet Take 100 mcg by mouth daily  4/29/2019 at AM Yes Unknown, Entered By History   magnesium oxide (MAG-OX) 400 MG tablet Take 1 tablet (400 mg) by mouth 2 times daily 4/28/2019 at PM Yes Corinna Donis MD   melatonin 3 MG tablet Take 2 tablets (6 mg) by mouth At Bedtime 4/28/2019 at PM Yes Arcadio Blackburn PA   montelukast (SINGULAIR) 10 MG tablet Take 1 tablet (10 mg) by mouth At Bedtime 4/28/2019 at PM Yes    multivitamin w/minerals (THERA-VIT-M) tablet Take 1 tablet by mouth daily 4/29/2019 at AM Yes Ann Marie Torres PA   mycophenolate (GENERIC EQUIVALENT) 250 MG capsule Take 3 capsules (750 mg) by mouth 2 times daily 4/29/2019 at AM Yes Elin Jensen, NP    nystatin (MYCOSTATIN) 719226 UNIT/ML suspension Take 5 mLs (500,000 Units) by mouth 4 times daily 4/29/2019 at AM Yes Ann Marie Torres PA   omeprazole (PRILOSEC) 40 MG DR capsule Take 1 capsule (40 mg) by mouth daily 4/28/2019 at PM Yes Elin Jensen NP   potassium chloride ER (K-DUR/KLOR-CON M) 20 MEQ CR tablet Take 2 tablets (40 mEq) by mouth 2 times daily 4/28/2019 at PM Yes Ann Marie Torres PA   predniSONE (DELTASONE) 5 MG tablet Take 5 mg by mouth 2 times daily. 4/29/2019 at AM Yes Elin Jensen NP   rosuvastatin (CRESTOR) 10 MG tablet Take 1 tablet (10 mg) by mouth daily 4/29/2019 at AM Yes Ann Marie Torres PA   senna (SENOKOT) 8.6 MG tablet Take 2 tablets by mouth 2 times daily as needed for constipation 4/29/2019 at AM Yes Unknown, Entered By History   tacrolimus (GENERIC EQUIVALENT) 0.5 MG capsule Take one 0.5 mg capsule by mouth every evening along with 3 mg for a total dose of 3.5 mg 4/28/2019 at PM Yes Unknown, Entered By History   tacrolimus (GENERIC EQUIVALENT) 1 MG capsule Take 3 mg by mouth in the morning and take 3.5 mg (3 x 1 mg + 1 x 0.5 mg) in the evening 4/29/2019 at AM Yes Unknown, Entered By History   sulfamethoxazole-trimethoprim (BACTRIM DS/SEPTRA DS) 800-160 MG tablet ON HOLD d/t leukopenia.  Patient not taking: Reported on 4/26/2019   Elin Jensen NP   valGANciclovir (VALCYTE) 450 MG tablet ON HOLD d/t leukopenia.  Patient not taking: Reported on 4/26/2019   Elin Jensen NP

## 2019-04-29 NOTE — PLAN OF CARE
Admission          4/29/2019  8:36 AM  -----------------------------------------------------------  Diagnosis: Neutropenic fever    Admitted from: [facility]  Report given from: ED RN  Via: Stretcher  Accompanied by: Self  Family Aware of Admission: Yes, parents Dk and Verito  Belongings: Placed in closet; medications sent to security  Admission Profile: Complete  Teaching: Orientation to unit, call don't fall, use of console, meal times, visiting hours,  when to call for the RN (angina/sob/dizzyness, etc.), and enforced importance of safety   Access: PIV  Telemetry: Placed on pt  Ht./Wt.: Complete    Temp:  [98.8  F (37.1  C)-98.9  F (37.2  C)] 98.9  F (37.2  C)  Pulse:  [115-121] 121  Heart Rate:  [115-123] 121  Resp:  [10-30] 18  BP: (101-137)/(64-98) 120/83  SpO2:  [95 %-100 %] 96 %

## 2019-04-30 PROBLEM — R91.8 PULMONARY NODULES: Status: ACTIVE | Noted: 2019-04-30

## 2019-04-30 PROBLEM — A41.50 GRAM NEGATIVE SEPSIS (H): Status: ACTIVE | Noted: 2019-04-30

## 2019-04-30 PROBLEM — S22.000A COMPRESSION FRACTURE OF THORACIC VERTEBRA (H): Status: ACTIVE | Noted: 2019-04-30

## 2019-04-30 LAB
ANION GAP SERPL CALCULATED.3IONS-SCNC: 8 MMOL/L (ref 3–14)
APPEARANCE FLD: NORMAL
BACTERIA SPEC CULT: ABNORMAL
BACTERIA SPEC CULT: ABNORMAL
BACTERIA SPEC CULT: NO GROWTH
BASOPHILS # BLD AUTO: 0 10E9/L (ref 0–0.2)
BASOPHILS NFR BLD AUTO: 3 %
BUN SERPL-MCNC: 19 MG/DL (ref 7–30)
CALCIUM SERPL-MCNC: 8.6 MG/DL (ref 8.5–10.1)
CHLORIDE SERPL-SCNC: 100 MMOL/L (ref 94–109)
CMV DNA SPEC NAA+PROBE-ACNC: NORMAL [IU]/ML
CMV DNA SPEC NAA+PROBE-LOG#: NORMAL {LOG_IU}/ML
CO2 SERPL-SCNC: 27 MMOL/L (ref 20–32)
COLOR FLD: COLORLESS
CREAT SERPL-MCNC: 1.13 MG/DL (ref 0.66–1.25)
DIFFERENTIAL METHOD BLD: ABNORMAL
EBV DNA # SPEC NAA+PROBE: NORMAL {COPIES}/ML
EBV DNA SPEC NAA+PROBE-LOG#: NORMAL {LOG_COPIES}/ML
EOSINOPHIL # BLD AUTO: 0 10E9/L (ref 0–0.7)
EOSINOPHIL NFR BLD AUTO: 0 %
ERYTHROCYTE [DISTWIDTH] IN BLOOD BY AUTOMATED COUNT: 16.3 % (ref 10–15)
FLUAV H1 2009 PAND RNA SPEC QL NAA+PROBE: NEGATIVE
FLUAV H1 RNA SPEC QL NAA+PROBE: NEGATIVE
FLUAV H3 RNA SPEC QL NAA+PROBE: NEGATIVE
FLUAV RNA SPEC QL NAA+PROBE: NEGATIVE
FLUBV RNA SPEC QL NAA+PROBE: NEGATIVE
GFR SERPL CREATININE-BSD FRML MDRD: 72 ML/MIN/{1.73_M2}
GLUCOSE SERPL-MCNC: 104 MG/DL (ref 70–99)
GRAM STN SPEC: ABNORMAL
GRAM STN SPEC: NORMAL
GRAM STN SPEC: NORMAL
HADV DNA SPEC QL NAA+PROBE: NEGATIVE
HADV DNA SPEC QL NAA+PROBE: NEGATIVE
HCT VFR BLD AUTO: 29.4 % (ref 40–53)
HGB BLD-MCNC: 9.1 G/DL (ref 13.3–17.7)
HMPV RNA SPEC QL NAA+PROBE: NEGATIVE
HPIV1 RNA SPEC QL NAA+PROBE: NEGATIVE
HPIV2 RNA SPEC QL NAA+PROBE: NEGATIVE
HPIV3 RNA SPEC QL NAA+PROBE: NEGATIVE
INR PPP: 1.51 (ref 0.86–1.14)
INTERPRETATION ECG - MUSE: NORMAL
KOH PREP SPEC: NORMAL
LYMPHOCYTES # BLD AUTO: 0.4 10E9/L (ref 0.8–5.3)
LYMPHOCYTES NFR BLD AUTO: 35 %
LYMPHOCYTES NFR FLD MANUAL: 1 %
Lab: ABNORMAL
Lab: NORMAL
MAGNESIUM SERPL-MCNC: 1.6 MG/DL (ref 1.6–2.3)
MCH RBC QN AUTO: 29.7 PG (ref 26.5–33)
MCHC RBC AUTO-ENTMCNC: 31 G/DL (ref 31.5–36.5)
MCV RBC AUTO: 96 FL (ref 78–100)
METAMYELOCYTES # BLD: 0.1 10E9/L
METAMYELOCYTES NFR BLD MANUAL: 5 %
MICROBIOLOGIST REVIEW: NORMAL
MONOCYTES # BLD AUTO: 0.1 10E9/L (ref 0–1.3)
MONOCYTES NFR BLD AUTO: 8 %
NEUTROPHILS # BLD AUTO: 0.6 10E9/L (ref 1.6–8.3)
NEUTROPHILS NFR BLD AUTO: 49 %
NEUTS BAND NFR FLD MANUAL: 1 %
OTHER CELLS FLD MANUAL: 98 %
PLATELET # BLD AUTO: 195 10E9/L (ref 150–450)
PLATELET # BLD EST: ABNORMAL 10*3/UL
POTASSIUM SERPL-SCNC: 3.4 MMOL/L (ref 3.4–5.3)
RADIOLOGIST FLAGS: ABNORMAL
RBC # BLD AUTO: 3.06 10E12/L (ref 4.4–5.9)
RBC MORPH BLD: NORMAL
RHINOVIRUS RNA SPEC QL NAA+PROBE: NEGATIVE
RSV RNA SPEC QL NAA+PROBE: NEGATIVE
RSV RNA SPEC QL NAA+PROBE: NEGATIVE
SODIUM SERPL-SCNC: 134 MMOL/L (ref 133–144)
SPECIMEN SOURCE FLD: NORMAL
SPECIMEN SOURCE: ABNORMAL
SPECIMEN SOURCE: NORMAL
TACROLIMUS BLD-MCNC: 9.6 UG/L (ref 5–15)
TME LAST DOSE: NORMAL H
WBC # BLD AUTO: 1.2 10E9/L (ref 4–11)
WBC # FLD AUTO: 131 /UL

## 2019-04-30 PROCEDURE — 87081 CULTURE SCREEN ONLY: CPT | Performed by: INTERNAL MEDICINE

## 2019-04-30 PROCEDURE — 87102 FUNGUS ISOLATION CULTURE: CPT | Performed by: INTERNAL MEDICINE

## 2019-04-30 PROCEDURE — 88312 SPECIAL STAINS GROUP 1: CPT | Performed by: INTERNAL MEDICINE

## 2019-04-30 PROCEDURE — 87040 BLOOD CULTURE FOR BACTERIA: CPT | Performed by: INTERNAL MEDICINE

## 2019-04-30 PROCEDURE — 87076 CULTURE ANAEROBE IDENT EACH: CPT | Performed by: INTERNAL MEDICINE

## 2019-04-30 PROCEDURE — 99233 SBSQ HOSP IP/OBS HIGH 50: CPT | Mod: GC | Performed by: INTERNAL MEDICINE

## 2019-04-30 PROCEDURE — 87206 SMEAR FLUORESCENT/ACID STAI: CPT | Performed by: INTERNAL MEDICINE

## 2019-04-30 PROCEDURE — 85025 COMPLETE CBC W/AUTO DIFF WBC: CPT | Performed by: INTERNAL MEDICINE

## 2019-04-30 PROCEDURE — 40000275 ZZH STATISTIC RCP TIME EA 10 MIN

## 2019-04-30 PROCEDURE — G0500 MOD SEDAT ENDO SERVICE >5YRS: HCPCS | Performed by: INTERNAL MEDICINE

## 2019-04-30 PROCEDURE — 87077 CULTURE AEROBIC IDENTIFY: CPT | Performed by: INTERNAL MEDICINE

## 2019-04-30 PROCEDURE — 21400000 ZZH R&B CCU UMMC

## 2019-04-30 PROCEDURE — 93005 ELECTROCARDIOGRAM TRACING: CPT

## 2019-04-30 PROCEDURE — 25800030 ZZH RX IP 258 OP 636: Performed by: INTERNAL MEDICINE

## 2019-04-30 PROCEDURE — 88108 CYTOPATH CONCENTRATE TECH: CPT | Performed by: INTERNAL MEDICINE

## 2019-04-30 PROCEDURE — 87449 NOS EACH ORGANISM AG IA: CPT | Performed by: INTERNAL MEDICINE

## 2019-04-30 PROCEDURE — 87205 SMEAR GRAM STAIN: CPT | Performed by: INTERNAL MEDICINE

## 2019-04-30 PROCEDURE — 89051 BODY FLUID CELL COUNT: CPT | Performed by: INTERNAL MEDICINE

## 2019-04-30 PROCEDURE — 83735 ASSAY OF MAGNESIUM: CPT | Performed by: INTERNAL MEDICINE

## 2019-04-30 PROCEDURE — 25000128 H RX IP 250 OP 636: Performed by: EMERGENCY MEDICINE

## 2019-04-30 PROCEDURE — 86635 COCCIDIOIDES ANTIBODY: CPT | Performed by: INTERNAL MEDICINE

## 2019-04-30 PROCEDURE — 31624 DX BRONCHOSCOPE/LAVAGE: CPT | Performed by: INTERNAL MEDICINE

## 2019-04-30 PROCEDURE — 25000128 H RX IP 250 OP 636: Performed by: INTERNAL MEDICINE

## 2019-04-30 PROCEDURE — 86698 HISTOPLASMA ANTIBODY: CPT | Performed by: INTERNAL MEDICINE

## 2019-04-30 PROCEDURE — 94640 AIRWAY INHALATION TREATMENT: CPT | Mod: 76

## 2019-04-30 PROCEDURE — 80197 ASSAY OF TACROLIMUS: CPT | Performed by: INTERNAL MEDICINE

## 2019-04-30 PROCEDURE — 87305 ASPERGILLUS AG IA: CPT | Performed by: INTERNAL MEDICINE

## 2019-04-30 PROCEDURE — 25000125 ZZHC RX 250: Performed by: INTERNAL MEDICINE

## 2019-04-30 PROCEDURE — 94640 AIRWAY INHALATION TREATMENT: CPT

## 2019-04-30 PROCEDURE — 36415 COLL VENOUS BLD VENIPUNCTURE: CPT | Performed by: INTERNAL MEDICINE

## 2019-04-30 PROCEDURE — 87385 HISTOPLASMA CAPSUL AG IA: CPT | Performed by: INTERNAL MEDICINE

## 2019-04-30 PROCEDURE — 87015 SPECIMEN INFECT AGNT CONCNTJ: CPT | Performed by: INTERNAL MEDICINE

## 2019-04-30 PROCEDURE — 87116 MYCOBACTERIA CULTURE: CPT | Performed by: INTERNAL MEDICINE

## 2019-04-30 PROCEDURE — 93010 ELECTROCARDIOGRAM REPORT: CPT | Performed by: INTERNAL MEDICINE

## 2019-04-30 PROCEDURE — 25000131 ZZH RX MED GY IP 250 OP 636 PS 637: Performed by: INTERNAL MEDICINE

## 2019-04-30 PROCEDURE — 86612 BLASTOMYCES ANTIBODY: CPT | Performed by: INTERNAL MEDICINE

## 2019-04-30 PROCEDURE — 25000132 ZZH RX MED GY IP 250 OP 250 PS 637: Performed by: INTERNAL MEDICINE

## 2019-04-30 PROCEDURE — 87186 SC STD MICRODIL/AGAR DIL: CPT | Performed by: INTERNAL MEDICINE

## 2019-04-30 PROCEDURE — 0B9H8ZX DRAINAGE OF LUNG LINGULA, VIA NATURAL OR ARTIFICIAL OPENING ENDOSCOPIC, DIAGNOSTIC: ICD-10-PCS | Performed by: INTERNAL MEDICINE

## 2019-04-30 PROCEDURE — 85610 PROTHROMBIN TIME: CPT | Performed by: INTERNAL MEDICINE

## 2019-04-30 PROCEDURE — 86606 ASPERGILLUS ANTIBODY: CPT | Performed by: INTERNAL MEDICINE

## 2019-04-30 PROCEDURE — 25000128 H RX IP 250 OP 636: Performed by: STUDENT IN AN ORGANIZED HEALTH CARE EDUCATION/TRAINING PROGRAM

## 2019-04-30 PROCEDURE — 80048 BASIC METABOLIC PNL TOTAL CA: CPT | Performed by: INTERNAL MEDICINE

## 2019-04-30 PROCEDURE — 87210 SMEAR WET MOUNT SALINE/INK: CPT | Performed by: INTERNAL MEDICINE

## 2019-04-30 PROCEDURE — 0B9G8ZX DRAINAGE OF LEFT UPPER LUNG LOBE, VIA NATURAL OR ARTIFICIAL OPENING ENDOSCOPIC, DIAGNOSTIC: ICD-10-PCS | Performed by: INTERNAL MEDICINE

## 2019-04-30 PROCEDURE — 87070 CULTURE OTHR SPECIMN AEROBIC: CPT | Performed by: INTERNAL MEDICINE

## 2019-04-30 RX ORDER — METOCLOPRAMIDE 10 MG/1
10 TABLET ORAL EVERY 6 HOURS PRN
Status: DISCONTINUED | OUTPATIENT
Start: 2019-04-30 | End: 2019-05-02

## 2019-04-30 RX ORDER — PROCHLORPERAZINE 25 MG
25 SUPPOSITORY, RECTAL RECTAL EVERY 12 HOURS PRN
Status: DISCONTINUED | OUTPATIENT
Start: 2019-04-30 | End: 2019-05-05

## 2019-04-30 RX ORDER — LIDOCAINE HYDROCHLORIDE 40 MG/ML
INJECTION, SOLUTION RETROBULBAR PRN
Status: DISCONTINUED | OUTPATIENT
Start: 2019-04-30 | End: 2019-04-30 | Stop reason: HOSPADM

## 2019-04-30 RX ORDER — ALBUTEROL SULFATE 0.83 MG/ML
SOLUTION RESPIRATORY (INHALATION) PRN
Status: DISCONTINUED | OUTPATIENT
Start: 2019-04-30 | End: 2019-04-30 | Stop reason: HOSPADM

## 2019-04-30 RX ORDER — ONDANSETRON 2 MG/ML
4 INJECTION INTRAMUSCULAR; INTRAVENOUS EVERY 6 HOURS PRN
Status: DISCONTINUED | OUTPATIENT
Start: 2019-04-30 | End: 2019-05-02

## 2019-04-30 RX ORDER — PROCHLORPERAZINE MALEATE 10 MG
10 TABLET ORAL EVERY 6 HOURS PRN
Status: DISCONTINUED | OUTPATIENT
Start: 2019-04-30 | End: 2019-05-05

## 2019-04-30 RX ORDER — METOCLOPRAMIDE HYDROCHLORIDE 5 MG/ML
10 INJECTION INTRAMUSCULAR; INTRAVENOUS EVERY 6 HOURS PRN
Status: DISCONTINUED | OUTPATIENT
Start: 2019-04-30 | End: 2019-05-02

## 2019-04-30 RX ORDER — PIPERACILLIN SODIUM, TAZOBACTAM SODIUM 4; .5 G/20ML; G/20ML
4.5 INJECTION, POWDER, LYOPHILIZED, FOR SOLUTION INTRAVENOUS EVERY 6 HOURS
Status: DISCONTINUED | OUTPATIENT
Start: 2019-04-30 | End: 2019-05-01

## 2019-04-30 RX ADMIN — IPRATROPIUM BROMIDE AND ALBUTEROL SULFATE 3 ML: .5; 3 SOLUTION RESPIRATORY (INHALATION) at 20:15

## 2019-04-30 RX ADMIN — POTASSIUM CHLORIDE 40 MEQ: 750 TABLET, EXTENDED RELEASE ORAL at 12:59

## 2019-04-30 RX ADMIN — PROCHLORPERAZINE EDISYLATE 10 MG: 5 INJECTION INTRAMUSCULAR; INTRAVENOUS at 05:31

## 2019-04-30 RX ADMIN — BALSALAZIDE DISODIUM 1500 MG: 750 CAPSULE ORAL at 19:40

## 2019-04-30 RX ADMIN — MONTELUKAST SODIUM 10 MG: 10 TABLET, COATED ORAL at 19:50

## 2019-04-30 RX ADMIN — MELATONIN TAB 3 MG 6 MG: 3 TAB at 19:50

## 2019-04-30 RX ADMIN — ENOXAPARIN SODIUM 40 MG: 40 INJECTION SUBCUTANEOUS at 16:04

## 2019-04-30 RX ADMIN — NYSTATIN 500000 UNITS: 100000 SUSPENSION ORAL at 12:59

## 2019-04-30 RX ADMIN — MAGNESIUM OXIDE TAB 400 MG (241.3 MG ELEMENTAL MG) 400 MG: 400 (241.3 MG) TAB at 19:41

## 2019-04-30 RX ADMIN — NYSTATIN 500000 UNITS: 100000 SUSPENSION ORAL at 07:51

## 2019-04-30 RX ADMIN — POTASSIUM CHLORIDE 40 MEQ: 750 TABLET, EXTENDED RELEASE ORAL at 19:40

## 2019-04-30 RX ADMIN — TOBRAMYCIN SULFATE 240 MG: 40 INJECTION, SOLUTION INTRAMUSCULAR; INTRAVENOUS at 16:06

## 2019-04-30 RX ADMIN — NYSTATIN 500000 UNITS: 100000 SUSPENSION ORAL at 19:41

## 2019-04-30 RX ADMIN — POTASSIUM CHLORIDE 20 MEQ: 750 TABLET, EXTENDED RELEASE ORAL at 13:00

## 2019-04-30 RX ADMIN — ACETAMINOPHEN 650 MG: 325 TABLET, FILM COATED ORAL at 19:54

## 2019-04-30 RX ADMIN — AZITHROMYCIN MONOHYDRATE 500 MG: 500 INJECTION, POWDER, LYOPHILIZED, FOR SOLUTION INTRAVENOUS at 23:21

## 2019-04-30 RX ADMIN — PIPERACILLIN SODIUM,TAZOBACTAM SODIUM 4.5 G: 4; .5 INJECTION, POWDER, FOR SOLUTION INTRAVENOUS at 13:35

## 2019-04-30 RX ADMIN — PIPERACILLIN SODIUM,TAZOBACTAM SODIUM 3.38 G: 3; .375 INJECTION, POWDER, FOR SOLUTION INTRAVENOUS at 02:10

## 2019-04-30 RX ADMIN — MAGNESIUM OXIDE TAB 400 MG (241.3 MG ELEMENTAL MG) 400 MG: 400 (241.3 MG) TAB at 12:58

## 2019-04-30 RX ADMIN — TACROLIMUS 3 MG: 1 CAPSULE ORAL at 07:51

## 2019-04-30 RX ADMIN — IPRATROPIUM BROMIDE AND ALBUTEROL SULFATE 3 ML: .5; 3 SOLUTION RESPIRATORY (INHALATION) at 07:31

## 2019-04-30 RX ADMIN — PIPERACILLIN SODIUM,TAZOBACTAM SODIUM 3.38 G: 3; .375 INJECTION, POWDER, FOR SOLUTION INTRAVENOUS at 07:50

## 2019-04-30 RX ADMIN — Medication 2 G: at 06:51

## 2019-04-30 RX ADMIN — TACROLIMUS 3.5 MG: 1 CAPSULE ORAL at 18:18

## 2019-04-30 RX ADMIN — IPRATROPIUM BROMIDE AND ALBUTEROL SULFATE 3 ML: .5; 3 SOLUTION RESPIRATORY (INHALATION) at 16:26

## 2019-04-30 RX ADMIN — NYSTATIN 500000 UNITS: 100000 SUSPENSION ORAL at 18:17

## 2019-04-30 RX ADMIN — PREDNISONE 5 MG: 5 TABLET ORAL at 18:18

## 2019-04-30 RX ADMIN — BALSALAZIDE DISODIUM 1500 MG: 750 CAPSULE ORAL at 07:50

## 2019-04-30 RX ADMIN — AZITHROMYCIN MONOHYDRATE 500 MG: 500 INJECTION, POWDER, LYOPHILIZED, FOR SOLUTION INTRAVENOUS at 00:11

## 2019-04-30 RX ADMIN — ASPIRIN 81 MG CHEWABLE TABLET 81 MG: 81 TABLET CHEWABLE at 12:57

## 2019-04-30 RX ADMIN — FLUTICASONE PROPIONATE 1 SPRAY: 50 SPRAY, METERED NASAL at 19:41

## 2019-04-30 RX ADMIN — PIPERACILLIN SODIUM,TAZOBACTAM SODIUM 4.5 G: 4; .5 INJECTION, POWDER, FOR SOLUTION INTRAVENOUS at 19:40

## 2019-04-30 RX ADMIN — LEVOTHYROXINE SODIUM 100 MCG: 25 TABLET ORAL at 12:58

## 2019-04-30 RX ADMIN — VANCOMYCIN HYDROCHLORIDE 1000 MG: 1 INJECTION, SOLUTION INTRAVENOUS at 02:45

## 2019-04-30 RX ADMIN — PREDNISONE 5 MG: 5 TABLET ORAL at 12:58

## 2019-04-30 RX ADMIN — FLUTICASONE PROPIONATE 1 SPRAY: 50 SPRAY, METERED NASAL at 07:53

## 2019-04-30 RX ADMIN — ROSUVASTATIN CALCIUM 10 MG: 10 TABLET, FILM COATED ORAL at 12:59

## 2019-04-30 RX ADMIN — DULOXETINE 20 MG: 20 CAPSULE, DELAYED RELEASE ORAL at 12:57

## 2019-04-30 RX ADMIN — ONDANSETRON 4 MG: 2 INJECTION INTRAMUSCULAR; INTRAVENOUS at 00:36

## 2019-04-30 RX ADMIN — IPRATROPIUM BROMIDE AND ALBUTEROL SULFATE 3 ML: .5; 3 SOLUTION RESPIRATORY (INHALATION) at 12:17

## 2019-04-30 RX ADMIN — OMEPRAZOLE 40 MG: 20 CAPSULE, DELAYED RELEASE ORAL at 19:41

## 2019-04-30 ASSESSMENT — MIFFLIN-ST. JEOR: SCORE: 1368.77

## 2019-04-30 ASSESSMENT — ACTIVITIES OF DAILY LIVING (ADL)
ADLS_ACUITY_SCORE: 15
ADLS_ACUITY_SCORE: 14
ADLS_ACUITY_SCORE: 16
ADLS_ACUITY_SCORE: 14
ADLS_ACUITY_SCORE: 15
ADLS_ACUITY_SCORE: 14

## 2019-04-30 NOTE — PROGRESS NOTES
CLINICAL NUTRITION SERVICES    Reason for Assessment:  Low-sodium (2 g/day) nutrition education, received consult    Diet History:  Pt received low-sodium nutrition education 11/2018.     Pt not in room, busy during second attempt, then sleeping during third attempt.    Nutrition Diagnosis:  Unable to assess    Nutrition Prescription/Recs:  Continue low-sodium diet, fluid restriction.       Interventions:  Nutrition Education  1. Attempted to provide verbal instruction on a heart-healthy diet with emphasis on low-sodium meal planning.   2. Left the following handouts in his room: How to Read Nutrition Labels, Low-Sodium Foods and Drinks, Tips for a Low-Sodium Diet, Seasoning Your Foods Without Adding Salt, and Managing Fluid Restriction.      Goals:    Pt will verbalize at least five high sodium foods and the importance of avoiding added salt to foods for cooking or seasoning foods.     Follow-up:   Patient to ask any further nutrition-related questions before discharge. In addition, pt may request outpatient RD appointment.     Jannie Guerrero, MS, RD, LD, [facility]   6C Pgr: [phone]

## 2019-04-30 NOTE — PLAN OF CARE
"/89 (BP Location: Right arm)   Pulse 121   Temp 98.2  F (36.8  C) (Oral)   Resp 18   Ht 1.727 m (5' 8\")   Wt 56.4 kg (124 lb 6.4 oz)   SpO2 99%   BMI 18.91 kg/m      Neuro: A&Ox4  Cardiac: SR/ST. VSS. Afebrile. Unchanged chest discomfort  Respiratory:  Maintaining adequate O2 sats on RA. MATA.   GI/: Voiding adequately. Diarrhea continues, per pt. C-diff and enteric panel negative   Diet/appetite: NPO since midnight. C/o nausea, zofran and compazine given with some relief.   Activity:  Up ad janeth/ SBA  Pain: Using tylenol for c/o abdominal aches/fullness.   Skin: No new skin deficits noted.   LDA's: PIV infusing D51/2NS at 50 mL/hr. Pt received azithromax, vanco, and zosyn overnight.     Plan: NPO since midnight for possible procedure today. Nursing will continue to follow the POC and update the MD team with concerns.    "

## 2019-04-30 NOTE — PROGRESS NOTES
Cardiology Progress Note  Everton Larios MRN: [id number]  Age: 54 year old, : 1965  04/30/19               Subjective     No acute events overnight since admission. This morning, he was sleepy but responsive to questions. Denies any worsening shortness of breath or abdominal pain.           Objective     Vitals:  Temp:  [98.1  F (36.7  C)-98.9  F (37.2  C)] 98.2  F (36.8  C)  Pulse:  [115-121] 121  Heart Rate:  [] 93  Resp:  [10-30] 18  BP: (101-137)/(64-98) 120/89  SpO2:  [95 %-100 %] 99 %    Intake/Output Summary (Last 24 hours) at 2019 0740  Last data filed at 2019 0652  Gross per 24 hour   Intake 879.17 ml   Output 500 ml   Net 379.17 ml       Vitals:    19 0837 19 1624 19 0407   Weight: 54.4 kg (120 lb) 56.1 kg (123 lb 9.6 oz) 56.4 kg (124 lb 6.4 oz)       Gen: lying in bed, sleeping   HEENT: No scleral icterus, Moist mucous membranes. No oropharyngeal erythema. Mild thrush in left lateral aspect of tongue (not assessed today)  CV: tachycardic; no murmurs, rubs or gallops   Resp: actively coughing, improved coarse breath sounds bilaterally   Abdomen: Soft, diffuse abdominal pain, normal active bowel sounds  Extremities: No peripheral edema, warm  Skin: sternotomy scar across torso   Neuro: asleep, grossly non-focal           Data:     Labs reviewed.     CT C/A/P with IV and oral contrast (2019):  1. New diffuse bilateral solid with peripheral groundglass pulmonary  nodules/opacities and  mediastinal adenopathy. Constellation of  findings are highly suspicious for infection (typical or atypical  infection) in this immune suppressed patient.  2. New small ascites.  3 Nonspecific small area of poorly defined heterogeneous posterior  right hepatic parenchyma. This may be secondary to the hepatic  fibrosis. Attention on follow-up examination.  4. New compression deformity of T12 vertebral body, since 3/19/2019.        Medications      Medications    aspirin  81 mg Oral Daily     azithromycin  500 mg Intravenous Q24H     balsalazide  1,500 mg Oral BID     DULoxetine  20 mg Oral Daily     fluticasone  1 spray Both Nostrils BID     ipratropium - albuterol 0.5 mg/2.5 mg/3 mL  3 mL Nebulization 4x daily     levothyroxine  100 mcg Oral Daily     magnesium oxide  400 mg Oral BID     melatonin  6 mg Oral At Bedtime     montelukast  10 mg Oral At Bedtime     nystatin  500,000 Units Oral 4x Daily     omeprazole  40 mg Oral QPM     piperacillin-tazobactam  3.375 g Intravenous Q6H     potassium chloride ER  40 mEq Oral BID     predniSONE  5 mg Oral BID w/meals     rosuvastatin  10 mg Oral Daily     tacrolimus  3 mg Oral QAM     tacrolimus  3.5 mg Oral QPM     vancomycin (VANCOCIN) IV  1,000 mg Intravenous Q12H       dextrose 5% and 0.45% NaCl 50 mL/hr at 04/29/19 2330     ACE/ARB/ARNI NOT PRESCRIBED       BETA BLOCKER NOT PRESCRIBED             Changes Today:     - Pulmonology consulted; bronchoscopy today  - MRI thoracic spine   - Tobramycin 240mg IV once  - Continue Zosyn  - Discontinue Vancomycin   - Blood cx  - DVT ppx initiated        Assessment and Plan:     Everton Larios is a 54 year old male with a history of NICM s/p OHT (2/24/2019), paroxysmal atrial fibrillation, recent T12 fracture (4/21/2019), ulcerative colitis, Pierce's esophagus, hypothyroidism, GERD, BPH and depression who is admitted with Pseudomonas bacteremia, new T12 fracture and multiple new pulmonary groundglass nodules.     #. Pseudomonas bacteremia  Blood cx from 4/29 shows pseudomonas bacteremia. Etiology likely pulmonary (possible pseudomonas pneumonia) with CT Chest on 4/30 revealing new diffuse bilateral solid with peripheral groundglass pulmonary nodules/opacities and  mediastinal adenopathy.   - Transplant ID consulted; appreciate assistance   - Pending cx susceptibilities  - Repeat blood cx today  - Antibiotics              - Zosyn 4/29-              - One time  tobramycin 240mg IV for double coverage although patient is clinically improving without clear need for prolonged double coverage     #. Pulmonary groundglass nodules  Notable productive cough of yellow sputum over the past few days, chest discomfort and sinus congestion noted. CXR with new nodular opacity overlying the right upper lobe. CT chest shows new diffuse bilateral solid with peripheral groundglass pulmonary nodules/opacities and  mediastinal adenopathy. Also concern for possible fungal infection.   - Pulmonology consulted; appreciate assistance  - Pending bronchoscopy cultures obtained 4/30/2019  - Transplant ID consulted; appreciate assistance  - Fungitell, Aspergillus galactomannan Ag ordered  - Fungal antibodies ordered  - Histoplasma U Ag ordered  - IgE, IgG, IgM, IgA ordered   - Respiratory viral panel pending  - Influenza A/B PCR negative   - Tessalon capsules prn   - Albuterol prn      #. Neutropenia   Afebrile and tachycardic to 120s on admission. Lactate wnl.  initially. UA unremarkable. Patient has noted subjective fevers at home in additional to upper respiratory tract symptoms, diffuse abdominal pain, ~5 episodes of non-bloody diarrhea in past 24 hours with stool softener use. Neutropenia likely in the setting of acute infection from bacteremia of respiratory (bacterial, fungal) or GI etiology (EBV, CMV, URI), medication-induced (bactrim, cellcept and valcyte). Hematological or rheumatological etiologies less likely. C.diff negative.   - Neutropenic precautions   - Transplant ID consulted; appreciate recommendations   - Held home cellcept, valcyte and bactrim  - CMV and EBV PCR pending  - D5 with 1/2NS at 50cc/hr   - Antibiotics               - Zosyn 4/29-                - Azithromycin 4/30-                             - Vancomycin 4/29-4/30    #. Abdominal pain  #. Non-bloody diarrhea   Lactate wnl. Pain predominantly epigastric, RUQ and LLQ on exam. Lipase wnl. Consider CMV colitis,  C.diff, EBV, UC flare, typhilitis contributing to diarrhea and abdominal pain. CT A/P shows nonspecific small area of poorly defined heterogeneous posterior right hepatic parenchyma possibly secondary to the hepatic fibrosis.   - Zofran prn   - C. Diff and enteric panel negative   - CMV and EBV PCR pending     #. NICM s/p OHT (2/24/2019) with RV dysfunction  Most recent biopsy 4/24/2019 with 1 R mild focal acute cellular rejection with no AMR. Bactrim and Valcyte held since 4/26/2019 due to leukopenia. Serostatus: CMV+, EBV+, HSV1+, HSV2 neg, VZV+  - Immunosuppresives:                - Held home cellcept               - Continue tacrolimus 3mg qAM and 3.5mg qPM               - Continue prednisone 5mg BID   - PPx: held valcyte and bactrim in setting of neutropenia. Continue nystatin.   - Diuretics: held bumex 2mg BID in setting of acute infection  - ASA 81mg daily  - Rosuvastatin 10mg daily   - Tacrolimus level 4/29 was 9.6  - Pending CMV and EBV PCR   - Weekly CMV ordered  - Repeat heart biopsy on 5/8/2019     #. Ulcerative colitis: continue home balsalazide 1500mg BID  #. Hypothyroidism: continue levothyroxine   #. GERD and history of Pierce's esophagus: continue omeprazole   #. Depression: continue duloxetine   #. Seasonal allergies: azelastine 0.1% nasal spray, fluticasone, montelukast   #. Insomnia: continue melatonin     Diet/IVF: cardiac diet; D5 with 1/2NS @ 50cc/hr   DVT ppx: Lovenox subcutaneous daily ([place] 6)   Disposition/Admission Status: admitted for further pseudomonas bacteremia   CODE: Full Code    Patient was discussed with staff attending, Dr. Hatch.    Jackson Turner MD  Internal Medicine, PGY-2  Cardiology Service  Pager: [phone]

## 2019-04-30 NOTE — CONSULTS
[facility]  Transplant Infectious Disease Consult Note - New Patient     Patient:  Everton Larios, Date of birth 1965, Medical record number [id number]  Date of Visit:  04/30/2019  Consult requested by Dr. Holden Hatch for evaluation of bacteremia         Assessment and Recommendations:   Recommendations:  - Please check Fungitell, Aspergillus galactomannan antigen of blood. Please check fungal antibodies (panel; he was a  and could have been exposed to cocci). Please check Histoplasma U Ag. Please check IgE, IgG, IgM, IgA.   - Since he is unable to give sputum for evaluation, would have pulmonary see him for further diagnostic workup.  - Please consider MRI of T12 area, to see if there is any underlying bone changes around compression deformity that would indicate an osteomyelitis.  - Ok to discontinue vancomycin.   - Would appreciate primary cardiology team assessment of risk of clearing gram negative bloodstream infection with presence of retained metal ring in SVC.   - Continue zosyn while awaiting ID and sensitivities of GNR in 4/29/2019 BCx.  - Continue azithromycin for new lung field abnormalities.  - Ok to hold off on Pneumocystis prophylaxis for now, while allowing his WBC to recover from bactrim.     Thank you very much for this consultation. Transplant Infectious Disease will continue to follow with you.    Assessment:  Everton Larios is a 55 yo gentleman who underwent orthotopic heart transplant 2/24/19.  Infectious Disease issues include:  - 4/29/2019 GNR sepsis. Likely is the cause of his neutropenic fever. He is responding clinically to zosyn. 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 Would appreciate primary cardiology team assessment of risk of clearing gram negative bloodstream infection with presence of retained metal ring in SVC.  -  4/30/3019 CT with new scattered bilateral peribronchial solid opacities with peripheral groundglass appearance, largest measures 1.5 cm. He also has mediastinal adenopathy. Constellation of findings are highly suspicious for infection (typical or atypical infection). He has been started on vanco and azithromycin. DDx includes septic emboli, fungal infection, atypical mycobacterial infection. Please check Fungitell, Aspergillus galactomannan antigen of blood. Please check fungal antibodies (panel; he was a  and could have been exposed to cocci). Please check Histoplasma U Ag. Since he is unable to give sputum for evaluation, would have pulmonary see him for further diagnostic workup.  - New compression deformity of T12 vertebral body, since 3/19/2019. Quantiferon negative prior to transplant. Please consider MRI of T12 area, to see if there is any underlying bone changes around compression deformity that would indicate an osteomyelitis.  - Hx of Klebsiella oxytoca from sputum specimen 3/20/2019. He has retrocardiac opacity on 3/22/2019 pCXR, and nodular groundglass opacities in the right lung on 3/19/2019. He had a good clinical symptom response to zosyn.  - QTc interval prolonged: 586 msec on 3/11/2019 EKG. 512 msec on 4/30/2019 EKG. Have to avoid levaquin for now.   - Pneumocystis prophylaxis: pentamidine given 3/3/2019, then on Bactrim until 4/26/2019 when he had neutropenia. Ok to hold off on Pneumocystis prophylaxis for now, while allowing his WBC to recover from bactrim.   - Fungal prophylaxis: nystatin since he had some thrush.   - Viral serostatus & prophylaxis: CMV+, EBV+, HSV1+, HSV2 neg, VZV+; holding valcyte due to neutropenia.   - Immunization status: to readdress at 6-months following transplant.   - Gamma globulin status: unknown  - Isolation status: Good hand hygiene. OK to discharge enteric precautions since stool has returned neg for C diff and enteric panel. Continue droplet while  awaiting results of RVP.     Cari Sagastume MD   Pager [phone]         History of Infectious Disease Illness:   Everton Larios is a 55 yo man who underwent orthotopic heart transplant 2/24/19 for non ischemic cardiomyopathy and cardiogenic shock. Post-op complicated by RV dysfunction and FRANKLIN. He completed antibiotics cefepime/zosyn for 7 day course (ending 3/16/19). On 3/20/2019, WBC had increased up to 10.2K. Sputum sample had been sent as he had cough. Grew Klebsiella, restarted on zosyn. He had had airway passage clogging and mucous since his heart transplant surgery. Sensitivities returned on the Klebsiella oxytoca, and it was sensitive to bactrim, so he was back on bactrim as of 3/23/2019. Starting around 4/6/2019, bactrim was given every couple of days, last dose on 4/11/2019. WBC was 5.2 on 4/17/2019. Next WBC check on 4/24/2019 showed a WBC of 1.1. MMF dose decreased. WBC on 4/26/2019 0.7, valcyte and bactrim held. He was in the ER with fatigue, malaise, nausea, RUQ pain, cough, congestion, WBC still 0.7. Imaging performed, some pulmonary changes. Started on vanco and zosyn overnight. Today he feels a little better. Was sweating a little overnight.      Transplants:  2/24/2019 (Heart), Postoperative day:  65.  Coordinator Jocelynn Jerez    Review of Systems:  CONSTITUTIONAL:  No fevers, but chilling less  EYES: negative for icterus or acute vision changes  ENT:  negative for acute hearing loss, tinnitus, sore throat  RESPIRATORY:  Cough dissipated a bit, not a lot of sputum, just a little dyspnea  CARDIOVASCULAR:  chest pain not as bad as yesterday, no palpitations. He felt tight in his chest yesterday.  GASTROINTESTINAL:  + nausea, + gagging, + dy heaves. Had diarrhea only because he took some stool softeners for constipation  GENITOURINARY:  negative for dysuria or hematuria  HEME:  + easy bruising but no bleeding  INTEGUMENT:  negative for rash or pruritus  NEURO:  Improved headache, still has  tremor    Past Medical History:   Diagnosis Date     Alcohol abuse      Pierce's esophagus 10/4/2018     Chronic rhinitis 10/4/2018     Chronic systolic heart failure (H) 10/4/2018     Depression 10/4/2018     Diastolic dysfunction      DJD (degenerative joint disease) - neck 10/4/2018     H/O congenital atrial septal defect (ASD) repair at age 3 10/4/2018     Hypothyroidism due to medication 10/4/2018     ICD, [employer] 2008; gen change 2/2018 10/4/2018     Intermittent asthma without complication 10/4/2018     Nonischemic cardiomyopathy (H) 10/4/2018     On amiodarone therapy 10/4/2018     Paroxysmal atrial fibrillation (H) 10/4/2018     S/P ablation of atrial fibrillation 10/4/2018     Systolic heart failure (H)      Ulcerative colitis (H) 10/4/2018     Ventricular tachycardia (H) 10/4/2018       Past Surgical History:   Procedure Laterality Date     AICD, DUAL CHAMBER       CV HEART BIOPSY N/A 3/4/2019    Procedure: Heart Biopsy;  Surgeon: Abhijeet Titus MD;  Location:  HEART CARDIAC CATH LAB     CV HEART BIOPSY N/A 3/25/2019    Procedure: Heart Biopsy;  Surgeon: Yves Rodrigues MD;  Location:  HEART CARDIAC CATH LAB     CV HEART BIOPSY N/A 3/28/2019    Procedure: Heart Cath Heart Biopsy;  Surgeon: Yves Rodrigues MD;  Location:  HEART CARDIAC CATH LAB     CV HEART BIOPSY N/A 4/10/2019    Procedure: CV HEART BIOPSY;  Surgeon: Isiah Mcallister MD;  Location:  HEART CARDIAC CATH LAB     CV HEART BIOPSY N/A 4/24/2019    Procedure: CV HEART BIOPSY;  Surgeon: Isiah Mcallister MD;  Location:  HEART CARDIAC CATH LAB     CV INTRA-AORTIC BALLOON PUMP INSERTION N/A 2/18/2019    Procedure: Intra-Aortic Balloon;  Surgeon: Alton Solomon MD;  Location:  HEART CARDIAC CATH LAB     CV INTRA-AORTIC BALLOON PUMP INSERTION N/A 2/20/2019    Procedure: Replace subclavian IABP;  Surgeon: Yves Rodrigues MD;  Location:  HEART CARDIAC CATH LAB     CV LEFT HEART CATH N/A  3/19/2019    Procedure: Left Heart Cath;  Surgeon: Yves Rodrigues MD;  [facility]     CV RIGHT HEART CATH N/A 3/19/2019    Procedure: RHC/HBx - Femoral access for 3/19 per Nimisha GONZALEZ;  Surgeon: Yves Rodrigues MD;  Location:  HEART CARDIAC CATH LAB     CV RIGHT HEART CATH N/A 3/25/2019    Procedure: Right Heart Cath;  Surgeon: Yves Rodrigues MD;  [facility]     CV RIGHT HEART CATH N/A 3/28/2019    Procedure: Heart Cath Right Heart Cath;  Surgeon: Yves Rodrigues MD;  Location:  HEART CARDIAC CATH LAB     CV RIGHT HEART CATH N/A 4/24/2019    Procedure: CV RIGHT HEART CATH;  Surgeon: Isiah Mcallister MD;  Location:  HEART CARDIAC CATH LAB     CV RIGHT HEART CATH N/A 3/11/2019    Procedure: ADD ON RHC/HBX;  Surgeon: Alton Solomon MD;  Location:  HEART CARDIAC CATH LAB     INCISION AND DRAINAGE CHEST WASHOUT, COMBINED N/A 3/21/2019    Procedure: Incision And Drainage; Evacuation Right Chest Wall Hematoma;  Surgeon: Dewayne House MD;  Location: [facility]     INSERT INTRAAORTIC BALLOON PUMP Left 2/19/2019    Procedure: Insert left  Subclavian Balloon Pump,  Removal Right femoral arterial balloom pump sheath;  Surgeon: Dewayne House MD;  [facility]     INSERT INTRAAORTIC BALLOON PUMP Left 2/21/2019    Procedure: SUBCLAVIAN BALLOON PUMP PLACEMENT;  Surgeon: Ben Wihte MD;  [facility]: [facility]     TRANSPLANT HEART RECIPIENT N/A 2/24/2019    Procedure: TRANSPLANT HEART RECIPIENT;  Surgeon: Dewayne House MD;  Location: [facility]       Family History   Problem Relation Age of Onset     Endometrial Cancer Mother      Hypertension Father      Endometrial Cancer Maternal Grandmother        Social History     Social History Narrative    Everton is a retired . He lives by himself in a townhouse in [place]. He has no lawn care responsibilities. He will be staying with his folks during his post-hospital early  transplant care time. No history of TB exposures.      Social History     Tobacco Use     Smoking status: Former Smoker     Years: 10.00     Smokeless tobacco: Never Used   Substance Use Topics     Alcohol use: Yes     Alcohol/week: 0.6 oz     Types: 1 Cans of beer per week     Comment: a couple times a week      Drug use: No       Immunization History   Administered Date(s) Administered     Influenza (IIV3) PF 10/22/2008, 09/25/2009, 11/07/2011, 11/30/2012, 10/27/2013, 10/02/2014     Influenza Vaccine IM 3yrs+ 4 Valent IIV4 10/02/2015     Influenza Vaccine, 3 YRS +, IM (QUADRIVALENT W/PRESERVATIVES) 09/12/2016, 10/16/2017, 09/20/2018     OPV, trivalent, live 10/20/1978     Pneumo Conj 13-V (2010&after) 09/26/2018     TDAP Vaccine (Boostrix) 07/20/2012     Td (Adult), Adsorbed 10/20/1978       Patient Active Problem List   Diagnosis     H/O congenital atrial septal defect (ASD) repair at age 3     Nonischemic cardiomyopathy (H)     Paroxysmal atrial fibrillation (H)     Ventricular tachycardia (H)     On amiodarone therapy     Ulcerative colitis (H)     ICD, [employer] 911 2008; gen change 2/2018     S/P ablation of atrial fibrillation     Hypothyroidism due to medication     Chronic systolic heart failure (H)     DJD (degenerative joint disease) - neck     Pierce's esophagus     Intermittent asthma without complication     Chronic rhinitis     Depression     Iron deficiency anemia     Heart failure, chronic, with acute decompensation (H)     Acute on chronic systolic congestive heart failure (H)     Chronic HFrEF (heart failure with reduced ejection fraction) (H)     Heart replaced by transplant (H)     Heart failure (H)     Pneumonia of left lower lobe due to Klebsiella oxytoca (H)     Prophylactic antibiotic     Debility     Abnormal white blood cell (WBC) count     Neutropenic fever (H)            Current Medications & Allergies:       aspirin  81 mg Oral Daily     azithromycin  500 mg Intravenous Q24H      "balsalazide  1,500 mg Oral BID     DULoxetine  20 mg Oral Daily     fluticasone  1 spray Both Nostrils BID     ipratropium - albuterol 0.5 mg/2.5 mg/3 mL  3 mL Nebulization 4x daily     levothyroxine  100 mcg Oral Daily     magnesium oxide  400 mg Oral BID     melatonin  6 mg Oral At Bedtime     montelukast  10 mg Oral At Bedtime     nystatin  500,000 Units Oral 4x Daily     omeprazole  40 mg Oral QPM     piperacillin-tazobactam  3.375 g Intravenous Q6H     potassium chloride ER  40 mEq Oral BID     predniSONE  5 mg Oral BID w/meals     rosuvastatin  10 mg Oral Daily     tacrolimus  3 mg Oral QAM     tacrolimus  3.5 mg Oral QPM     vancomycin (VANCOCIN) IV  1,000 mg Intravenous Q12H       Infusions/Drips:    dextrose 5% and 0.45% NaCl 50 mL/hr at 04/29/19 2330     ACE/ARB/ARNI NOT PRESCRIBED       BETA BLOCKER NOT PRESCRIBED         Allergies   Allergen Reactions     Hydromorphone Other (See Comments)     Significant Delirium     Lisinopril Other (See Comments)     hypotension     Codeine Nausea            Physical Exam:   Vitals were reviewed.  All vitals stable.  Patient Vitals for the past 24 hrs:   BP Temp Temp src Pulse Resp SpO2 Height Weight   04/30/19 0407 120/89 98.2  F (36.8  C) Oral -- 18 99 % -- 56.4 kg (124 lb 6.4 oz)   04/29/19 2328 109/80 98.1  F (36.7  C) Oral -- 18 98 % -- --   04/29/19 1915 106/65 98.6  F (37  C) Oral -- 18 95 % -- --   04/29/19 1624 120/83 98.9  F (37.2  C) Oral 121 18 96 % 1.727 m (5' 8\") 56.1 kg (123 lb 9.6 oz)   04/29/19 1500 -- -- -- -- 20 -- -- --   04/29/19 1300 122/81 -- -- -- 14 99 % -- --   04/29/19 1200 101/69 -- -- 120 10 95 % -- --   04/29/19 1150 123/82 -- -- -- 10 95 % -- --   04/29/19 1130 119/79 -- -- 119 30 95 % -- --   04/29/19 1120 122/81 -- -- -- 21 95 % -- --   04/29/19 1100 104/69 -- -- 116 10 97 % -- --   04/29/19 1000 (!) 137/92 -- -- 118 12 97 % -- --     Ranges for vital signs:  Temp:  [98.1  F (36.7  C)-98.9  F (37.2  C)] 98.2  F (36.8  C)  Pulse:  " [116-121] 121  Heart Rate:  [] 93  Resp:  [10-30] 18  BP: (101-137)/(65-92) 120/89  SpO2:  [95 %-99 %] 99 %  Vitals:    04/29/19 0837 04/29/19 1624 04/30/19 0407   Weight: 54.4 kg (120 lb) 56.1 kg (123 lb 9.6 oz) 56.4 kg (124 lb 6.4 oz)       Physical Examination:  GENERAL:  well-developed, well-nourished man, resting in bed in no acute distress.  HEAD:  Head is normocephalic, atraumatic   EYES:  Eyes have anicteric sclerae without conjunctival injection   ENT:  Oropharynx is moist without ulcers. Tongue is midline. Trace of thrush vs residual nystatin on tongue.   NECK:  Supple. No cervical lymphadenopathy  LUNGS:  Clear to auscultation bilateral.   CARDIOVASCULAR:  Regular rate and rhythm with no murmur. Chest tube sites scabbed as would be expected at this point after transplant.  ABDOMEN:  Normal bowel sounds, soft, minimally tender throughout.   SKIN:  No acute rashes. PIV Line in place without any surrounding erythema or exudate. Midline sternal incision healing as expected.  NEUROLOGIC:  Grossly nonfocal. Active x4 extremities         Laboratory Data:     Inflammatory Markers    Recent Labs   Lab Test 04/29/19  0911 11/15/18  0955 10/22/18  1239   .0* 6.1 12.5       Metabolic Studies       Recent Labs   Lab Test 04/30/19  0523 04/29/19  0918 04/29/19  0911 04/26/19  0848  02/23/19  1835     --  131* 138   < > 133   POTASSIUM 3.4  --  3.5 3.8   < > 3.6   CHLORIDE 100  --  94 104   < > 93*   CO2 27  --  24 26   < > 30   ANIONGAP 8  --  12 9   < > 10   BUN 19  --  23 47*   < > 28   CR 1.13  --  1.04 1.80*   < > 1.03   GFRESTIMATED 72  --  80 41*   < > 81   *  --  79 134*   < > 114*   A1C  --   --   --   --   --  5.7*   RADAMES 8.6  --  9.0 8.7   < > 8.9   PHOS  --   --   --  3.6   < > 3.6   MAG 1.6  --   --  2.2   < > 2.0   LACT  --  1.7 1.6  --    < >  --    PCAL  --   --  0.09  --   --   --     < > = values in this interval not displayed.       Hepatic Studies    Recent Labs   Lab Test  04/29/19  0911 04/04/19  0840 04/01/19  0653  03/14/19  1653  03/10/19  0410   BILITOTAL 1.0 0.6 0.6   < >  --    < > 0.8   DBIL  --   --  0.4*   < > 0.4*  --   --    ALKPHOS 125 139 135   < >  --    < > 104   PROTTOTAL 7.1 5.8* 5.6*   < >  --    < > 5.0*   ALBUMIN 3.5 2.9* 2.8*   < >  --    < > 2.8*   AST 10 16 14   < >  --    < > 22   ALT 17 26 28   < >  --    < > 19   LDH  --   --   --   --  320*  --  276*    < > = values in this interval not displayed.       Pancreatitis testing    Recent Labs   Lab Test 04/29/19  0911 03/18/19  0505 02/23/19  1835 11/15/18  0955   AMYLASE  --  51 22*  --    LIPASE 42* 237  --   --    TRIG  --   --   --  112       Gout Labs      Recent Labs   Lab Test 11/15/18  0955   URIC 9.9*       Hematology Studies      Recent Labs   Lab Test 04/30/19  0523 04/29/19  0911 04/26/19  0848 04/24/19  0903 04/17/19  1111 04/10/19  0840   WBC 1.2* 0.7* 0.7* 1.1* 5.2 9.8   ANEU 0.6* 0.2* 0.3* 0.6*  --   --    ALYM 0.4* 0.4* 0.4* 0.4*  --   --    GRACE 0.1 0.0 0.0 0.0  --   --    AEOS 0.0 0.0 0.0 0.1  --   --    HGB 9.1* 10.8* 9.4* 9.9* 9.2* 9.9*   HCT 29.4* 35.3* 30.8* 33.2* 30.1* 32.8*    242 169 196 187 177       Clotting Studies    Recent Labs   Lab Test 04/30/19  0523 03/27/19  0521 03/26/19  0531 03/25/19  0612  03/14/19  1653   INR 1.51* 1.39* 1.38* 1.41*   < > 1.36*   PTT  --   --   --   --   --  46*    < > = values in this interval not displayed.       Iron Testing    Recent Labs   Lab Test 04/30/19  0523  03/14/19  2116 03/14/19  1653  11/15/18  0955  10/11/18  1235   IRON  --   --   --   --   --   --   --  63   FEB  --   --   --   --   --   --   --  457*   IRONSAT  --   --   --   --   --   --   --  14*   NATE  --   --   --   --   --  1,045*  --   --    MCV 96   < > 94  --    < >  --    < > 87   B12  --   --   --   --   --  1,857*  --   --    HAPT  --   --   --  143  --   --   --   --    RETP  --   --  4.3* 4.1*   < >  --   --   --    RETICABSCT  --   --  100.8* 100.8*   < >  --   --    --     < > = values in this interval not displayed.       Arterial Blood Gas Testing    Recent Labs   Lab Test 03/11/19  1152 03/10/19  1214 03/09/19  0400 03/08/19  2147  02/25/19  1717 02/25/19  1502 02/25/19  0918  02/25/19  0350 02/25/19  0110   PH  --   --   --   --   --  7.32* 7.31* 7.33*  --  7.39 7.35   PCO2  --   --   --   --   --  36 51* 46*  --  42 42   PO2  --   --   --   --   --  110* 75* 125*  --  87 170*   HCO3  --   --   --   --   --  19* 26 24  --  25 23   O2PER 21 21 21 21.0   < > 3L 2  2 40.0  40.0   < > 40.0  40.0 60.0    < > = values in this interval not displayed.        Thyroid Studies     Recent Labs   Lab Test 03/09/19  1148 01/02/19  0906 11/20/18  0337 11/15/18  0955 10/11/18  1235   TSH 1.05 10.41*  --  15.97* 15.44*   T4  --  1.32 1.37  --  1.11       Urine Studies     Recent Labs   Lab Test 04/29/19  1031 03/26/19  1707 03/17/19  0045 03/04/19  1303 02/23/19  1921   URINEPH 6.0 5.5 5.5 5.0 6.0   NITRITE Negative Negative Negative Negative Negative   LEUKEST Negative Negative Negative Negative Negative   WBCU <1 2 15* 0 <1       Medication levels    Recent Labs   Lab Test 04/26/19  0848   TACROL 9.1       Microbiology:  Last Culture results with specimen source  Culture Micro   Date Value Ref Range Status   04/29/2019 (A)  Final    >10 Squamous epithelial cells/low power field indicates oral contamination. Please   recollect.     04/29/2019 Canceled, Test credited  Final   04/29/2019   Final    Notification of test cancellation was given to  Parul Singh RN, @1276 04/29/19..     04/29/2019 (A)  Preliminary    Cultured on the 1st day of incubation:  Gram negative rods     04/29/2019   Preliminary    Critical Value/Significant Value, preliminary result only, called to and read back by  Cheyenne Taylor RN [id number], @0848 on 4/30/19 [facility]     04/29/2019 Culture negative < 24 hours, reincubate  Preliminary   04/29/2019 No growth after 19 hours  Preliminary   03/27/2019 No growth  Final    03/20/2019 Heavy growth  Normal nima    Final   03/20/2019 Light growth  Klebsiella oxytoca   (A)  Final   03/13/2019 No growth  Final   03/13/2019 No growth  Final   03/09/2019 No growth  Final   03/09/2019 No growth  Final   03/04/2019 No growth  Final   03/04/2019 No growth  Final   03/04/2019 No growth  Final   02/23/2019 (A)  Final    Canceled, Test credited  >10 Squamous epithelial cells/low power field indicates oral contamination. Please   recollect.     02/23/2019   Final    Notification of test cancellation was given to  SHAY STEELE RN 2153 2.23.19 [facility]      Specimen Description   Date Value Ref Range Status   04/29/2019 Swab  Final   04/29/2019 Feces  Final   04/29/2019 Sputum  Final   04/29/2019 Sputum  Final   04/29/2019 Blood Left Arm  Final   04/29/2019 Unspecified Urine  Final   04/29/2019 Blood Right Arm  Final   03/27/2019 Catheterized Urine  Final   03/20/2019 Sputum  Final   03/20/2019 Sputum  Final   03/17/2019 Feces  Final   03/13/2019 Blood Right Hand  Final   03/13/2019 Blood Right Hand  Final   03/10/2019 Feces  Final   03/09/2019 Blood Right Hand  Final   03/09/2019 Blood Left Hand  Final   03/04/2019 Blood Right Hand  Final   03/04/2019 Blood Right Hand  Final   03/04/2019 Catheter tip swan  Final        Last check of C difficile  C Diff Toxin B PCR   Date Value Ref Range Status   04/29/2019 Negative NEG^Negative Final     Comment:     Negative: Clostridium difficile target DNA sequences NOT detected, presumed   negative for Clostridium difficile toxin B or the number of bacteria present   may be below the limit of detection for the test.  FDA approved assay performed using [employer] GeneXpert real-time PCR.  A negative result does not exclude actual disease due to Clostridium difficile   and may be due to improper collection, handling and storage of the specimen   or the number of organisms in the specimen is below the detection limit of the   assay.         Infection Studies to assess  Diarrhea   Recent Labs   Lab Test 04/29/19  1511   EPCAMP Not Detected   EPSALM Not Detected   EPSHGL Not Detected   EPVIB Not Detected   EPROTA Not Detected   EPNORO Not Detected   EPYER Not Detected       Syphilis Testing    Treponema Antibodies   Date Value Ref Range Status   02/22/2019 Nonreactive NR^Nonreactive Final       Quantiferon testing   Recent Labs   Lab Test 02/22/19  1057   TBRES Negative       Virology:  Log IU/mL of CMVQNT   Date Value Ref Range Status   04/26/2019 Not Calculated <2.1 [Log_IU]/mL Final   04/24/2019 Not Calculated <2.1 [Log_IU]/mL Final   04/17/2019 Not Calculated <2.1 [Log_IU]/mL Final   04/08/2019 Not Calculated <2.1 [Log_IU]/mL Final   04/01/2019 Not Calculated <2.1 [Log_IU]/mL Final   03/25/2019 Not Calculated <2.1 [Log_IU]/mL Final   03/18/2019 Not Calculated <2.1 [Log_IU]/mL Final       EBV DNA Copies/mL   Date Value Ref Range Status   04/26/2019 EBV DNA Not Detected EBVNEG^EBV DNA Not Detected [Copies]/mL Final   03/27/2019 EBV DNA Not Detected EBVNEG^EBV DNA Not Detected [Copies]/mL Final       Hepatitis B Testing     Recent Labs   Lab Test 03/14/19  1218 02/15/19  1147   AUSAB 0.70 1.15   HBCAB  --  Nonreactive   HEPBANG Nonreactive Nonreactive        Hepatitis C Antibody   Date Value Ref Range Status   02/15/2019 Nonreactive NR^Nonreactive Final     Comment:     Assay performance characteristics have not been established for newborns,   infants, and children         CMV Antibody IgG   Date Value Ref Range Status   02/23/2019 >8.0 (H) 0.0 - 0.8 AI Final     Comment:     Positive  Antibody index (AI) values reflect qualitative changes in antibody   concentration that cannot be directly associated with clinical condition or   disease state.     02/15/2019 >8.0 (H) 0.0 - 0.8 AI Final     Comment:     Positive  Antibody index (AI) values reflect qualitative changes in antibody   concentration that cannot be directly associated with clinical condition or   disease state.        Varicella Zoster Virus Antibody IgG   Date Value Ref Range Status   02/15/2019 6.8 (H) 0.0 - 0.8 AI Final     Comment:     Positive, suggests prev. exposure and probable immunity  Antibody index (AI) values reflect qualitative changes in antibody   concentration that cannot be directly associated with clinical condition or   disease state.       EBV Capsid Antibody IgG   Date Value Ref Range Status   02/23/2019 >8.0 (H) 0.0 - 0.8 AI Final     Comment:     Positive, suggests recent or past exposure  Antibody index (AI) values reflect qualitative changes in antibody   concentration that cannot be directly associated with clinical condition or   disease state.     02/15/2019 >8.0 (H) 0.0 - 0.8 AI Final     Comment:     Positive, suggests recent or past exposure  Antibody index (AI) values reflect qualitative changes in antibody   concentration that cannot be directly associated with clinical condition or   disease state.       Toxoplasma Antibody IgG   Date Value Ref Range Status   02/22/2019 <3.0 0.0 - 7.1 IU/mL Final     Comment:     Negative- Absence of detectable Toxoplasma gondii IgG antibodies. A negative   result does not rule out acute infection.  The magnitude of the measured result is not indicative of the amount of   antibody present. The concentrations of anti-Toxoplasma gondii IgG in a given   specimen determined with assays from different manufacturers can vary due to   differences in assay methods and reagent specificity.       Herpes Simplex Virus Type 1 IgG   Date Value Ref Range Status   02/15/2019 >8.0 (H) 0.0 - 0.8 AI Final     Comment:     Positive.  IgG antibody to HSV-1 detected.  Antibody index (AI) values reflect qualitative changes in antibody   concentration that cannot be directly associated with clinical condition or   disease state.       Herpes Simplex Virus Type 2 IgG   Date Value Ref Range Status   02/15/2019 <0.2 0.0 - 0.8 AI Final     Comment:     No HSV-2 IgG antibodies  detected.  Antibody index (AI) values reflect qualitative changes in antibody   concentration that cannot be directly associated with clinical condition or   disease state.         Imaging:  Recent Results (from the past 48 hour(s))   XR Chest 2 Views   Result Value    Radiologist flags Pulmonary nodules    Narrative    XR CHEST 2 VW  4/29/2019 12:34 PM      HISTORY: Chest pain    COMPARISON: 4/6/2019, 3/22/2019, 3/15/2019    FINDINGS: PA and lateral views of the chest. Postoperative changes  from cardiac transplantation. Stable heart size. Unchanged metallic  hyperdensity SVC. New nodular opacity overlying the right upper lobe.  Slightly decreased streaky right upper lobe opacities. No pneumothorax  or substantial pleural fluid.      Impression    IMPRESSION:   1. New nodular opacity overlying the right upper lobe, potentially  external to patient. Consider follow-up chest x-ray versus CT if  clinically indicated. Not present on recent chest x-ray and therefore  pneumonia is prime consideration.  2. Decreased right upper lobe atelectasis or infection.  3. Postoperative changes from cardiac transplantation with stable  heart size.    [Consider Follow Up: Pulmonary nodules]    This report will be copied to the [facility] to ensure a  provider acknowledges the finding.     I have personally reviewed the examination and initial interpretation  and I agree with the findings.    ZAC MIRZA MD   CT Chest/Abdomen/Pelvis w Contrast   Result Value    Radiologist flags Pulmonary opacities and T12 and vertebral body (Urgent)    Narrative    EXAMINATION: CT CHEST/ABDOMEN/PELVIS W CONTRAST, 4/29/2019 9:07 PM    TECHNIQUE:  Helical CT images from the lung apices through the  symphysis pubis were obtained with IV contrast. Contrast dose:  iopamidol (ISOVUE-370) solution 76 mL. Additional oral contrast was  given.    COMPARISON: CT chest 3/19/2019, CT abdomen pelvis 2/26/2019    HISTORY: Sepsis; 55yo M with heart  transplant (2/2019) admitted with  neutropenic fever, chest discomfort and abdominal pain (epigastric,  RUQ)    FINDINGS:    CHEST: Postoperative changes of of heart transplant. Left  brachiocephalic vein stent is in place and appears to be patent.  Intact thoracic aorta. No central pulmonary embolism. No pericardial  effusion. Mediastinal adenopathy. The central tracheobronchial tree is  patent. No pneumothorax or pleural effusion. New scattered bilateral  peribronchial solid opacities with peripheral groundglass appearance,  largest measures 1.5 cm. Flow artifact at the confluence of the right  internal jugular vein and subclavian vein. Retained metal ring in the  wall of the superior vena cava. Presumably part of the pacemaker lead  was removed at heart transplantation.    Abdomen and pelvis:     Unchanged few hypodense hepatic foci since prior. Small area of poorly  defined heterogeneous appearance of the hepatic parenchyma in the  posterior right hepatic lobe appearance. Pancreas, adrenal glands,  spleen are unremarkable. No hydronephrosis or nephroureterolithiasis.  Stable bilateral partially exophytic fat density renal foci, likely  angiomyolipoma. Unchanged focal left renal cortical atrophic. Urinary  bladder is partially distended and unremarkable. No pneumoperitoneum,  pneumatosis or portal venous gas. The abdominal aorta is intact.     Since 2/26/2019, new small ascites in the pelvis. Oral contrast  reached the transverse colon. No bowel obstruction.    Bones and soft tissues: Since 3/19/2019, new compression deformity of  T12 vertebral body, with approximately 30-40% height loss. Stable  sternotomy wires.      Impression    IMPRESSION:   In this patient with history of heart transplantation,  1. New diffuse bilateral solid with peripheral groundglass pulmonary  nodules/opacities and  mediastinal adenopathy. Constellation of  findings are highly suspicious for infection (typical or atypical  infection) in  this immune suppressed patient.  2. New small ascites.  3 Nonspecific small area of poorly defined heterogeneous posterior  right hepatic parenchyma. This may be secondary to the hepatic  fibrosis. Attention on follow-up examination.  4. New compression deformity of T12 vertebral body, since 3/19/2019.    [Urgent Result: Pulmonary opacities and T12 and vertebral body  compression fracture.]    Finding was identified on 4/29/2019 9:32 PM.     Dr. Sutherland was contacted by Dr. Perla Parham M.D.  at  4/29/2019 9:58 PM and verbalized understanding of the urgent finding.       I have personally reviewed the examination and initial interpretation  and I agree with the findings.    ZAC MIRZA MD

## 2019-04-30 NOTE — CONSULTS
[facility]    Pulmonary, Allergy, Critical Care and Sleep Medicine    Initial Consultation  04/30/2019    Everton Larios    Age: 54 year old    MRN# [id number]  YOB: 1965    Date of Admission: 4/29/2019  Reason for Consultation: New infiltrates on CT in setting of recent heart transplant   Requesting Team: Malka Gibson     Primary care provider: Fredrick Wolff     Assessment and Recommendations:    Everton Larios is a 54 year old male with hx of NICM s/p orthotopic heart transplant (2/24/19), pAF, recent T12 fracture (4/21/19), hx of ulcerative colitis, history of Pierce's esophagus, and hypertension who was admitted for neutropenic fever, URI symptoms and abdominal pain.  Pulmonary consulted for consideration of bronchoscopy in the setting of new infiltrates on CT and neutropenia.    New peribronchiolar nodular opacities with surrounding groundglass  Neutropenia  Orthotopic heart transplant on chronic immunosuppression  CT highly concerning for infection in the setting of neutropenia and has the appearance of fungal etiology. His GNR bacteremia may be driving his neutropenia but could be related to his immunosuppression meds.  Is reasonable to move forward with bronchoscopy today to evaluate potential infectious etiology.     -Agree with empiric antibiotics   -Bronchoscopy today (bronch note for further details following procedure)  -Primary team to follow up BAL results    Seen and discussed with Dr. Chiang.    Dewayne Hernandez MD  Pulmonary and Critical Care Fellow   Pager [phone]    Chief Complaint and History of Present Illness:    CC:  Shortness of breath and cough     HPI: 54 year old male with hx of NICM s/p orthotopic heart transplant (2/24/19), pAF, recent T12 fracture (4/21/19), hx of ulcerative colitis, history of Pierce's esophagus, and hypertension who was admitted for neutropenic fever, URI symptoms and abdominal pain.     Patient notes 3 to 4 days  of sinus congestion, productive cough of yellow and green sputum and centrally located abdominal pain.  He had done relatively well since being discharged from ARU on 4/12.  Denies recent sick contacts  or recent travel.  He has noted quite a bit of dyspnea on exertion that has become so bad that he is unable to do his activities of daily living.  He recently has been unable to go up 1 flight of stairs without issue.    In the ER he was found to have a white count of 0.7 (ANC 0.2), CRP of 120 and a procalcitonin of 0.09.  His blood cultures are growing gram-negative rods.  He has remained afebrile since admission.  He was started on vancomycin, Zosyn and azithromycin and is being followed by transplant ID.  A CT chest showed new bilateral peribronchial solid opacities with peripheral groundglass most concerning for underlying infection.    On my evaluation this morning, he is very sleepy.  He continues to have chest congestion and continuous cough.  He feels like his cough is beginning to thin out and therefore is not able to clear his chest.     Review of Systems:  C: negative for fever, chills, change in weight, change in appetite  INTEGUMENTARY/SKIN: no rash or obvious new lesions  ENT/MOUTH: no sore throat, no sinus pain, no nasal drainage  RESP: see interval history  CV: no chest pain, no palpitations, no peripheral edema, no orthopnea  GI: + abdominal pain, no nausea, no vomiting, no reflux symptoms, no change in stools  : no dysuria  MUSCULOSKELETAL: no myalgias, no arthralgias  ENDOCRINE: blood sugars with adequate control  NEURO: no headache, no numbness or tingling  PSYCHIATRIC: mood stable    Histories, Prior to Admission Medications, Allergies:    Past Medical History:  Past Medical History:   Diagnosis Date     Alcohol abuse      Pierce's esophagus 10/4/2018     Chronic rhinitis 10/4/2018     Chronic systolic heart failure (H) 10/4/2018     Depression 10/4/2018     Diastolic dysfunction      DJD  (degenerative joint disease) - neck 10/4/2018     H/O congenital atrial septal defect (ASD) repair at age 3 10/4/2018     Hypothyroidism due to medication 10/4/2018     ICD, [employer] 2008; gen change 2/2018 10/4/2018     Intermittent asthma without complication 10/4/2018     Nonischemic cardiomyopathy (H) 10/4/2018     On amiodarone therapy 10/4/2018     Paroxysmal atrial fibrillation (H) 10/4/2018     S/P ablation of atrial fibrillation 10/4/2018     Systolic heart failure (H)      Ulcerative colitis (H) 10/4/2018     Ventricular tachycardia (H) 10/4/2018       Past Surgical History:  Past Surgical History:   Procedure Laterality Date     AICD, DUAL CHAMBER       CV HEART BIOPSY N/A 3/4/2019    Procedure: Heart Biopsy;  Surgeon: Abhijeet Titus MD;  Location:  HEART CARDIAC CATH LAB     CV HEART BIOPSY N/A 3/25/2019    Procedure: Heart Biopsy;  Surgeon: Yves Rodrigues MD;  Location:  HEART CARDIAC CATH LAB     CV HEART BIOPSY N/A 3/28/2019    Procedure: Heart Cath Heart Biopsy;  Surgeon: Yves Rodrigues MD;  Location:  HEART CARDIAC CATH LAB     CV HEART BIOPSY N/A 4/10/2019    Procedure: CV HEART BIOPSY;  Surgeon: Isiah Mcallister MD;  Location:  HEART CARDIAC CATH LAB     CV HEART BIOPSY N/A 4/24/2019    Procedure: CV HEART BIOPSY;  Surgeon: Isiah Mcallister MD;  Location:  HEART CARDIAC CATH LAB     CV INTRA-AORTIC BALLOON PUMP INSERTION N/A 2/18/2019    Procedure: Intra-Aortic Balloon;  Surgeon: Altno Solomon MD;  Location:  HEART CARDIAC CATH LAB     CV INTRA-AORTIC BALLOON PUMP INSERTION N/A 2/20/2019    Procedure: Replace subclavian IABP;  Surgeon: Yves Rodrigues MD;  Location:  HEART CARDIAC CATH LAB     CV LEFT HEART CATH N/A 3/19/2019    Procedure: Left Heart Cath;  Surgeon: Yves Rodrigues MD;  Location:  HEART CARDIAC CATH LAB     CV RIGHT HEART CATH N/A 3/19/2019    Procedure: RHC/HBx - Femoral access for 3/19 per Nimisha GONZALEZ;  Surgeon:  Yves Rodrigues MD;  Location:  HEART CARDIAC CATH LAB     CV RIGHT HEART CATH N/A 3/25/2019    Procedure: Right Heart Cath;  Surgeon: Yves Rodrigues MD;  Location:  HEART CARDIAC CATH LAB     CV RIGHT HEART CATH N/A 3/28/2019    Procedure: Heart Cath Right Heart Cath;  Surgeon: Yves Rodrigues MD;  Location:  HEART CARDIAC CATH LAB     CV RIGHT HEART CATH N/A 4/24/2019    Procedure: CV RIGHT HEART CATH;  Surgeon: Isiah Mcallister MD;  Location:  HEART CARDIAC CATH LAB     CV RIGHT HEART CATH N/A 3/11/2019    Procedure: ADD ON RHC/HBX;  Surgeon: Alton Solomon MD;  Location:  HEART CARDIAC CATH LAB     INCISION AND DRAINAGE CHEST WASHOUT, COMBINED N/A 3/21/2019    Procedure: Incision And Drainage; Evacuation Right Chest Wall Hematoma;  Surgeon: Dewayne House MD;  Location: [facility] OR     INSERT INTRAAORTIC BALLOON PUMP Left 2/19/2019    Procedure: Insert left  Subclavian Balloon Pump,  Removal Right femoral arterial balloom pump sheath;  Surgeon: Dewayne House MD;  Location: [facility]     INSERT INTRAAORTIC BALLOON PUMP Left 2/21/2019    Procedure: SUBCLAVIAN BALLOON PUMP PLACEMENT;  Surgeon: Ben White MD;  Location: [facility] OR     TRANSPLANT HEART RECIPIENT N/A 2/24/2019    Procedure: TRANSPLANT HEART RECIPIENT;  Surgeon: Dewayne House MD;  Location: [facility] OR       Past Social History:  ETOH: none   Tobacco: 20 pack year, quit 10 years ago   Significant inhalational exposures: None   PPD/TB exposure status: None     Social History     Socioeconomic History     Marital status: Single     Spouse name: Not on file     Number of children: Not on file     Years of education: Not on file     Highest education level: Not on file   Occupational History     Not on file   Social Needs     Financial resource strain: Not on file     Food insecurity:     Worry: Not on file     Inability: Not on file     Transportation needs:     Medical: Not on file     Non-medical: Not on file    Tobacco Use     Smoking status: Former Smoker     Years: 10.00     Smokeless tobacco: Never Used   Substance and Sexual Activity     Alcohol use: Yes     Alcohol/week: 0.6 oz     Types: 1 Cans of beer per week     Comment: a couple times a week      Drug use: No     Sexual activity: Not on file   Lifestyle     Physical activity:     Days per week: Not on file     Minutes per session: Not on file     Stress: Not on file   Relationships     Social connections:     Talks on phone: Not on file     Gets together: Not on file     Attends Protestant service: Not on file     Active member of club or organization: Not on file     Attends meetings of clubs or organizations: Not on file     Relationship status: Not on file     Intimate partner violence:     Fear of current or ex partner: Not on file     Emotionally abused: Not on file     Physically abused: Not on file     Forced sexual activity: Not on file   Other Topics Concern     Not on file   Social History Narrative    Everton is a retired . [place]. He has no lawn care responsibilities. He will be staying with his folks during his post-hospital early transplant care time. No history of TB exposures.        Family History:  Family History   Problem Relation Age of Onset     Endometrial Cancer Mother      Hypertension Father      Endometrial Cancer Maternal Grandmother        Medications:    aspirin  81 mg Oral Daily     azithromycin  500 mg Intravenous Q24H     balsalazide  1,500 mg Oral BID     DULoxetine  20 mg Oral Daily     fluticasone  1 spray Both Nostrils BID     ipratropium - albuterol 0.5 mg/2.5 mg/3 mL  3 mL Nebulization 4x daily     levothyroxine  100 mcg Oral Daily     magnesium oxide  400 mg Oral BID     melatonin  6 mg Oral At Bedtime     montelukast  10 mg Oral At Bedtime     nystatin  500,000 Units Oral 4x Daily     omeprazole  40 mg Oral QPM     piperacillin-tazobactam  3.375 g Intravenous Q6H      potassium chloride ER  40 mEq Oral BID     predniSONE  5 mg Oral BID w/meals     rosuvastatin  10 mg Oral Daily     tacrolimus  3 mg Oral QAM     tacrolimus  3.5 mg Oral QPM     vancomycin (VANCOCIN) IV  1,000 mg Intravenous Q12H     acetaminophen, albuterol, albuterol, azelastine, benzonatate, calcium carbonate, HOLD MEDICATION, lidocaine inhalant, magnesium sulfate, magnesium sulfate, metoclopramide **OR** metoclopramide, ondansetron, ondansetron, potassium chloride, potassium chloride with lidocaine, potassium chloride, potassium chloride, potassium chloride, prochlorperazine **OR** prochlorperazine **OR** prochlorperazine, ACE/ARB/ARNI NOT PRESCRIBED, BETA BLOCKER NOT PRESCRIBED    Allergies:     Allergies   Allergen Reactions     Hydromorphone Other (See Comments)     Significant Delirium     Lisinopril Other (See Comments)     hypotension     Codeine Nausea       Physical Exam:    Temp:  [98.1  F (36.7  C)-98.9  F (37.2  C)] 98.4  F (36.9  C)  Pulse:  [116-121] 121  Heart Rate:  [] 99  Resp:  [10-30] 20  BP: (101-123)/(65-89) 110/77  SpO2:  [95 %-99 %] 97 %    Intake/Output Summary (Last 24 hours) at 4/30/2019 1039  Last data filed at 4/30/2019 0800  Gross per 24 hour   Intake 1439.17 ml   Output 500 ml   Net 939.17 ml     General: laying in bed, in no acute distress, very sleepy   HEENT: anicteric, moist mucosa  Neck: no palpable lymphadenopathy, no JVD noted  Chest: some mild expiratory wheeze, no crackles    Cardiac: RRR no murmurs  Abdomen: Soft, flat, non tender, active BS  Extremities: no lower extremity edema  Neuro: awake and oriented x3, no focal deficits   Skin: no rash noted    Laboratory, imaging, and microbiologic data:    All laboratory and imaging data reviewed.      WBC 1.2     Procalcitonin: 0.09  Lactate 1.7    CT chest/abdomen pelvis:   1. New diffuse bilateral solid with peripheral groundglass pulmonary  nodules/opacities and  mediastinal adenopathy. Constellation of  findings  are highly suspicious for infection (typical or atypical  infection) in this immune suppressed patient.  2. New small ascites.  3 Nonspecific small area of poorly defined heterogeneous posterior  right hepatic parenchyma. This may be secondary to the hepatic  fibrosis. Attention on follow-up examination.  4. New compression deformity of T12 vertebral body, since 3/19/2019.

## 2019-04-30 NOTE — PLAN OF CARE
Pt admitted for c/o chest pain and shortness of breathe.  Pt with history of heart transplant 2-24-19, T12 compression fracture(4-21-19), carrera's esophagus, TMJ, and depression. Pt is in enteric, droplet and neutropenic precautions at this time. Cultures for influenza A/B and respiratory viral panel sent via nasal swab. Pt is in enteric iso due to complaints of frequent loose stools, stool for C-Diff rule out sent. Pt observed to have WBC of 0.7 and absolute neuts of 0.2, thus neutropenic precautions for this pt enabled. Pt able to ambulate to/from rest room independantly with cane.

## 2019-04-30 NOTE — OR NURSING
Procedure: Bronchoscopy with LAVAGE  Sedation: Conscious sedation (2 mg versed)  O2: 2 LPM NC  Tolerated: VS stable during and post procedure. ETCO2 monitored throughout procedure. No abd or chest pain noted  Specimens: Specimen(s) handled by RT  Report: Given to 6C RN  Pt to recovery area in stable condition, accompanied by RN      Polina Sheppard RN

## 2019-04-30 NOTE — PLAN OF CARE
NEURO: Pt very lethargic in AM, falling asleep during assessment and med pass. Pt stated he has been tired for the last few days. MD aware. In afternoon pt more alert, oriented x4.  RESPIRATORY: LS diminished, crackles in LLL. SpO2 >90% on RA. Awaiting sputum culture. Pt went down for a bronchoscopy- see note, samples were taken. Droplet precautions continue pending rule out respiratory panel.   CARDIO: HR sinus rhythm-sinus tachycardia: 90-100s. VSS.   GI/: BS active. Voiding into bedside urinal. Swallowing WNL after procedure. Tolerating PO intake. Poor appetite. Enteric precautions d/c'd.   SKIN: Sternal incision - WNL, ELEONORA, edges approximated. Chest tube sites - have scabbing, no drainage. Dry -ELEONORA. Otherwise skin intact.   ACTIVITY: Up ab janeth.   PAIN: Denied pain.   LINES: PIV infusing- site WNL.   PLAN:  continue abx and monitoring pt. Notify MD with concerns.

## 2019-05-01 ENCOUNTER — RESULTS ONLY (OUTPATIENT)
Dept: OTHER | Facility: CLINIC | Age: 54
End: 2019-05-01

## 2019-05-01 ENCOUNTER — APPOINTMENT (OUTPATIENT)
Dept: OCCUPATIONAL THERAPY | Facility: CLINIC | Age: 54
DRG: 853 | End: 2019-05-01
Payer: COMMERCIAL

## 2019-05-01 ENCOUNTER — APPOINTMENT (OUTPATIENT)
Dept: MRI IMAGING | Facility: CLINIC | Age: 54
DRG: 853 | End: 2019-05-01
Payer: COMMERCIAL

## 2019-05-01 ENCOUNTER — DOCUMENTATION ONLY (OUTPATIENT)
Dept: CARE COORDINATION | Facility: CLINIC | Age: 54
End: 2019-05-01

## 2019-05-01 PROBLEM — A41.52: Status: ACTIVE | Noted: 2019-04-30

## 2019-05-01 LAB
ANION GAP SERPL CALCULATED.3IONS-SCNC: 11 MMOL/L (ref 3–14)
ANISOCYTOSIS BLD QL SMEAR: SLIGHT
BASOPHILS # BLD AUTO: 0 10E9/L (ref 0–0.2)
BASOPHILS NFR BLD AUTO: 0.7 %
BUN SERPL-MCNC: 23 MG/DL (ref 7–30)
BURR CELLS BLD QL SMEAR: SLIGHT
CALCIUM SERPL-MCNC: 8.1 MG/DL (ref 8.5–10.1)
CHLORIDE SERPL-SCNC: 104 MMOL/L (ref 94–109)
CO2 SERPL-SCNC: 21 MMOL/L (ref 20–32)
CREAT SERPL-MCNC: 1.36 MG/DL (ref 0.66–1.25)
DIFFERENTIAL METHOD BLD: ABNORMAL
EOSINOPHIL # BLD AUTO: 0 10E9/L (ref 0–0.7)
EOSINOPHIL NFR BLD AUTO: 2.2 %
ERYTHROCYTE [DISTWIDTH] IN BLOOD BY AUTOMATED COUNT: 16.6 % (ref 10–15)
GFR SERPL CREATININE-BSD FRML MDRD: 58 ML/MIN/{1.73_M2}
GLUCOSE SERPL-MCNC: 235 MG/DL (ref 70–99)
HCT VFR BLD AUTO: 26.3 % (ref 40–53)
HGB BLD-MCNC: 8 G/DL (ref 13.3–17.7)
INR PPP: 1.31 (ref 0.86–1.14)
LYMPHOCYTES # BLD AUTO: 0.2 10E9/L (ref 0.8–5.3)
LYMPHOCYTES NFR BLD AUTO: 34.8 %
MAGNESIUM SERPL-MCNC: 2.1 MG/DL (ref 1.6–2.3)
MCH RBC QN AUTO: 29.5 PG (ref 26.5–33)
MCHC RBC AUTO-ENTMCNC: 30.4 G/DL (ref 31.5–36.5)
MCV RBC AUTO: 97 FL (ref 78–100)
MONOCYTES # BLD AUTO: 0.1 10E9/L (ref 0–1.3)
MONOCYTES NFR BLD AUTO: 23.9 %
NEUTROPHILS # BLD AUTO: 0.2 10E9/L (ref 1.6–8.3)
NEUTROPHILS NFR BLD AUTO: 38.4 %
NRBC # BLD AUTO: 0 10*3/UL
NRBC BLD AUTO-RTO: 1 /100
PLATELET # BLD AUTO: 167 10E9/L (ref 150–450)
POIKILOCYTOSIS BLD QL SMEAR: SLIGHT
POTASSIUM SERPL-SCNC: 4 MMOL/L (ref 3.4–5.3)
RBC # BLD AUTO: 2.71 10E12/L (ref 4.4–5.9)
RBC INCLUSIONS BLD: SLIGHT
SODIUM SERPL-SCNC: 136 MMOL/L (ref 133–144)
WBC # BLD AUTO: 0.6 10E9/L (ref 4–11)

## 2019-05-01 PROCEDURE — 82784 ASSAY IGA/IGD/IGG/IGM EACH: CPT | Performed by: HOSPITALIST

## 2019-05-01 PROCEDURE — 94640 AIRWAY INHALATION TREATMENT: CPT

## 2019-05-01 PROCEDURE — 86832 HLA CLASS I HIGH DEFIN QUAL: CPT | Performed by: EMERGENCY MEDICINE

## 2019-05-01 PROCEDURE — 80048 BASIC METABOLIC PNL TOTAL CA: CPT | Performed by: INTERNAL MEDICINE

## 2019-05-01 PROCEDURE — 82785 ASSAY OF IGE: CPT | Performed by: HOSPITALIST

## 2019-05-01 PROCEDURE — 25000132 ZZH RX MED GY IP 250 OP 250 PS 637: Performed by: HOSPITALIST

## 2019-05-01 PROCEDURE — 87040 BLOOD CULTURE FOR BACTERIA: CPT | Performed by: HOSPITALIST

## 2019-05-01 PROCEDURE — 97165 OT EVAL LOW COMPLEX 30 MIN: CPT | Mod: GO

## 2019-05-01 PROCEDURE — 85025 COMPLETE CBC W/AUTO DIFF WBC: CPT | Performed by: INTERNAL MEDICINE

## 2019-05-01 PROCEDURE — 40000275 ZZH STATISTIC RCP TIME EA 10 MIN

## 2019-05-01 PROCEDURE — 97535 SELF CARE MNGMENT TRAINING: CPT | Mod: GO

## 2019-05-01 PROCEDURE — 36415 COLL VENOUS BLD VENIPUNCTURE: CPT | Performed by: INTERNAL MEDICINE

## 2019-05-01 PROCEDURE — 25000125 ZZHC RX 250: Performed by: INTERNAL MEDICINE

## 2019-05-01 PROCEDURE — 25800025 ZZH RX 258: Performed by: INTERNAL MEDICINE

## 2019-05-01 PROCEDURE — 25000132 ZZH RX MED GY IP 250 OP 250 PS 637: Performed by: INTERNAL MEDICINE

## 2019-05-01 PROCEDURE — 25000128 H RX IP 250 OP 636: Performed by: HOSPITALIST

## 2019-05-01 PROCEDURE — 85610 PROTHROMBIN TIME: CPT | Performed by: INTERNAL MEDICINE

## 2019-05-01 PROCEDURE — 86828 HLA CLASS I&II ANTIBODY QUAL: CPT | Performed by: EMERGENCY MEDICINE

## 2019-05-01 PROCEDURE — 94640 AIRWAY INHALATION TREATMENT: CPT | Mod: 76

## 2019-05-01 PROCEDURE — 21400000 ZZH R&B CCU UMMC

## 2019-05-01 PROCEDURE — 83735 ASSAY OF MAGNESIUM: CPT | Performed by: INTERNAL MEDICINE

## 2019-05-01 PROCEDURE — 25000131 ZZH RX MED GY IP 250 OP 636 PS 637: Performed by: INTERNAL MEDICINE

## 2019-05-01 PROCEDURE — 25000128 H RX IP 250 OP 636: Performed by: INTERNAL MEDICINE

## 2019-05-01 PROCEDURE — 99233 SBSQ HOSP IP/OBS HIGH 50: CPT | Mod: GC | Performed by: INTERNAL MEDICINE

## 2019-05-01 PROCEDURE — 72146 MRI CHEST SPINE W/O DYE: CPT

## 2019-05-01 PROCEDURE — 36415 COLL VENOUS BLD VENIPUNCTURE: CPT | Performed by: HOSPITALIST

## 2019-05-01 PROCEDURE — 97110 THERAPEUTIC EXERCISES: CPT | Mod: GO

## 2019-05-01 PROCEDURE — 86833 HLA CLASS II HIGH DEFIN QUAL: CPT | Performed by: EMERGENCY MEDICINE

## 2019-05-01 PROCEDURE — 25000128 H RX IP 250 OP 636: Performed by: SURGERY

## 2019-05-01 RX ORDER — CEFEPIME HYDROCHLORIDE 1 G/1
1 INJECTION, POWDER, FOR SOLUTION INTRAMUSCULAR; INTRAVENOUS EVERY 12 HOURS
Status: DISCONTINUED | OUTPATIENT
Start: 2019-05-02 | End: 2019-05-02

## 2019-05-01 RX ORDER — CEFEPIME HYDROCHLORIDE 1 G/1
1 INJECTION, POWDER, FOR SOLUTION INTRAMUSCULAR; INTRAVENOUS EVERY 8 HOURS
Status: DISCONTINUED | OUTPATIENT
Start: 2019-05-01 | End: 2019-05-01

## 2019-05-01 RX ORDER — SENNOSIDES 8.6 MG
8.6 TABLET ORAL 2 TIMES DAILY PRN
Status: DISCONTINUED | OUTPATIENT
Start: 2019-05-01 | End: 2019-05-08 | Stop reason: HOSPADM

## 2019-05-01 RX ORDER — AZITHROMYCIN 500 MG/1
500 TABLET, FILM COATED ORAL DAILY
Status: DISCONTINUED | OUTPATIENT
Start: 2019-05-01 | End: 2019-05-02

## 2019-05-01 RX ORDER — LORAZEPAM 2 MG/ML
1-2 INJECTION INTRAMUSCULAR
Status: COMPLETED | OUTPATIENT
Start: 2019-05-01 | End: 2019-05-01

## 2019-05-01 RX ORDER — BENZONATATE 100 MG/1
100 CAPSULE ORAL 3 TIMES DAILY PRN
Status: DISCONTINUED | OUTPATIENT
Start: 2019-05-01 | End: 2019-05-01

## 2019-05-01 RX ADMIN — MELATONIN TAB 3 MG 6 MG: 3 TAB at 19:40

## 2019-05-01 RX ADMIN — LEVOTHYROXINE SODIUM 100 MCG: 25 TABLET ORAL at 08:23

## 2019-05-01 RX ADMIN — ACETAMINOPHEN 650 MG: 325 TABLET, FILM COATED ORAL at 23:52

## 2019-05-01 RX ADMIN — POTASSIUM CHLORIDE 40 MEQ: 750 TABLET, EXTENDED RELEASE ORAL at 19:41

## 2019-05-01 RX ADMIN — IPRATROPIUM BROMIDE AND ALBUTEROL SULFATE 3 ML: .5; 3 SOLUTION RESPIRATORY (INHALATION) at 07:57

## 2019-05-01 RX ADMIN — TACROLIMUS 3 MG: 1 CAPSULE ORAL at 08:30

## 2019-05-01 RX ADMIN — MAGNESIUM OXIDE TAB 400 MG (241.3 MG ELEMENTAL MG) 400 MG: 400 (241.3 MG) TAB at 08:24

## 2019-05-01 RX ADMIN — ACETAMINOPHEN 650 MG: 325 TABLET, FILM COATED ORAL at 08:26

## 2019-05-01 RX ADMIN — POTASSIUM CHLORIDE 20 MEQ: 750 TABLET, EXTENDED RELEASE ORAL at 13:13

## 2019-05-01 RX ADMIN — NYSTATIN 500000 UNITS: 100000 SUSPENSION ORAL at 16:56

## 2019-05-01 RX ADMIN — BALSALAZIDE DISODIUM 1500 MG: 750 CAPSULE ORAL at 08:24

## 2019-05-01 RX ADMIN — LORAZEPAM 1 MG: 2 INJECTION INTRAMUSCULAR; INTRAVENOUS at 11:09

## 2019-05-01 RX ADMIN — OMEPRAZOLE 40 MG: 20 CAPSULE, DELAYED RELEASE ORAL at 19:39

## 2019-05-01 RX ADMIN — DEXTROSE AND SODIUM CHLORIDE: 5; 450 INJECTION, SOLUTION INTRAVENOUS at 23:52

## 2019-05-01 RX ADMIN — TACROLIMUS 3.5 MG: 1 CAPSULE ORAL at 17:31

## 2019-05-01 RX ADMIN — MAGNESIUM OXIDE TAB 400 MG (241.3 MG ELEMENTAL MG) 400 MG: 400 (241.3 MG) TAB at 19:40

## 2019-05-01 RX ADMIN — NYSTATIN 500000 UNITS: 100000 SUSPENSION ORAL at 08:24

## 2019-05-01 RX ADMIN — DULOXETINE 20 MG: 20 CAPSULE, DELAYED RELEASE ORAL at 08:25

## 2019-05-01 RX ADMIN — IPRATROPIUM BROMIDE AND ALBUTEROL SULFATE 3 ML: .5; 3 SOLUTION RESPIRATORY (INHALATION) at 19:52

## 2019-05-01 RX ADMIN — ACETAMINOPHEN 650 MG: 325 TABLET, FILM COATED ORAL at 16:47

## 2019-05-01 RX ADMIN — PREDNISONE 5 MG: 5 TABLET ORAL at 17:30

## 2019-05-01 RX ADMIN — FILGRASTIM 300 MCG: 300 INJECTION, SOLUTION INTRAVENOUS; SUBCUTANEOUS at 19:36

## 2019-05-01 RX ADMIN — NYSTATIN 500000 UNITS: 100000 SUSPENSION ORAL at 19:40

## 2019-05-01 RX ADMIN — IPRATROPIUM BROMIDE AND ALBUTEROL SULFATE 3 ML: .5; 3 SOLUTION RESPIRATORY (INHALATION) at 12:17

## 2019-05-01 RX ADMIN — PREDNISONE 5 MG: 5 TABLET ORAL at 08:25

## 2019-05-01 RX ADMIN — DEXTROSE AND SODIUM CHLORIDE: 5; 450 INJECTION, SOLUTION INTRAVENOUS at 00:26

## 2019-05-01 RX ADMIN — FLUTICASONE PROPIONATE 1 SPRAY: 50 SPRAY, METERED NASAL at 19:41

## 2019-05-01 RX ADMIN — ENOXAPARIN SODIUM 40 MG: 40 INJECTION SUBCUTANEOUS at 14:56

## 2019-05-01 RX ADMIN — PIPERACILLIN SODIUM,TAZOBACTAM SODIUM 4.5 G: 4; .5 INJECTION, POWDER, FOR SOLUTION INTRAVENOUS at 14:55

## 2019-05-01 RX ADMIN — POTASSIUM CHLORIDE 40 MEQ: 750 TABLET, EXTENDED RELEASE ORAL at 08:24

## 2019-05-01 RX ADMIN — PIPERACILLIN SODIUM,TAZOBACTAM SODIUM 4.5 G: 4; .5 INJECTION, POWDER, FOR SOLUTION INTRAVENOUS at 02:41

## 2019-05-01 RX ADMIN — ASPIRIN 81 MG CHEWABLE TABLET 81 MG: 81 TABLET CHEWABLE at 08:24

## 2019-05-01 RX ADMIN — AZITHROMYCIN MONOHYDRATE 500 MG: 500 TABLET ORAL at 14:56

## 2019-05-01 RX ADMIN — MONTELUKAST SODIUM 10 MG: 10 TABLET, COATED ORAL at 19:39

## 2019-05-01 RX ADMIN — NYSTATIN 500000 UNITS: 100000 SUSPENSION ORAL at 11:14

## 2019-05-01 RX ADMIN — ROSUVASTATIN CALCIUM 10 MG: 10 TABLET, FILM COATED ORAL at 08:25

## 2019-05-01 RX ADMIN — PIPERACILLIN SODIUM,TAZOBACTAM SODIUM 4.5 G: 4; .5 INJECTION, POWDER, FOR SOLUTION INTRAVENOUS at 08:30

## 2019-05-01 RX ADMIN — CEFEPIME 1 G: 1 INJECTION, POWDER, FOR SOLUTION INTRAMUSCULAR; INTRAVENOUS at 15:44

## 2019-05-01 RX ADMIN — FLUTICASONE PROPIONATE 1 SPRAY: 50 SPRAY, METERED NASAL at 08:26

## 2019-05-01 RX ADMIN — BALSALAZIDE DISODIUM 1500 MG: 750 CAPSULE ORAL at 19:39

## 2019-05-01 ASSESSMENT — ACTIVITIES OF DAILY LIVING (ADL)
ADLS_ACUITY_SCORE: 14
ADLS_ACUITY_SCORE: 16

## 2019-05-01 ASSESSMENT — PAIN DESCRIPTION - DESCRIPTORS: DESCRIPTORS: SORE

## 2019-05-01 NOTE — PLAN OF CARE
Pt with history of a heart tx in February continues on IVF at 50 ml/hr. Had a bronchoscopy today.Good urine output. No longer on droplet precautions, continues on contact precautions. VSS. ST 100s-120s. Tired and sleeping a lot today but up in the chair for supper. Tylenol for back pain.

## 2019-05-01 NOTE — PLAN OF CARE
Discharge Planner OT   Patient plan for discharge: Pt would like to return home with OP CR.   Current status: Evaluation completed and treatment initiated. Therapist educated pt on OT role and discussed POC/Goals for therapy. Pt required SBA and vc's for transfer sit<->standing transfer. Pt ambulated ~500ft with CGA, vc's and use of IV pole for stability. Pt required extra time for standing/seated rest breaks. VSS.   Barriers to return to prior living situation: Decreased strength and endurance, fatigue.   Recommendations for discharge: Home with A and OP CR.   Rationale for recommendations: Pt will benefit from continued OP CR to maximize functional strength and endurance.        Entered by: Porfirio Law 05/01/2019 10:42 AM

## 2019-05-01 NOTE — PLAN OF CARE
NEURO: Alert and oriented x4.   RESPIRATORY: LS diminished in the bases, SpO2>90% on RA. Pt has frequent productive cough, thick sputum. Cough suppressant and lozenge ordered.   CARDIO: HR sinus rhythm- sinus tachycardia. VSS.   GI/: BS active, + gas, + loose stools x2. Voiding into urinal. Tolerating PO intake. Good appetite.   SKIN: Midline incision- edges approximated, area WNL. Chest tube sites- scabbing, WNL. Other skin intact.   ACTIVITY: Up ad janeth.   PAIN: Tylenol PRN given x1 for back pain in AM. MRI of thoracic spine done- results pending.   LINES: PIV infusing MIV and intermittent abx. Site WNL.   PLAN:  Continue to monitor vitals, labs and condition and administering fluids and antibiotics. Notify MD with concerns.

## 2019-05-01 NOTE — PROGRESS NOTES
Cardiology Progress Note  Everton Larios MRN: [id number]  Age: 54 year old, : 1965  04/30/19               Subjective     No acute events overnight, patient states he is feeling better. Denies any worsening shortness of breath or abdominal pain.           Objective     Vitals:  Temp:  [97.9  F (36.6  C)-98.7  F (37.1  C)] 98.7  F (37.1  C)  Heart Rate:  [] 104  Resp:  [18] 18  BP: (101-131)/(70-88) 114/75  Cuff Mean (mmHg):  [85] 85  SpO2:  [97 %-100 %] 98 %    Intake/Output Summary (Last 24 hours) at 2019 0740  Last data filed at 2019 0652  Gross per 24 hour   Intake 879.17 ml   Output 500 ml   Net 379.17 ml       Vitals:    19 0837 19 1624 19 0407   Weight: 54.4 kg (120 lb) 56.1 kg (123 lb 9.6 oz) 56.4 kg (124 lb 6.4 oz)       Gen: FRANTZ, pleasant  HEENT: No scleral icterus, Moist mucous membranes. No oropharyngeal erythema. Mild thrush in left lateral aspect of tongue (not assessed today)  CV: tachycardic; no murmurs, rubs or gallops   Resp: actively coughing, improved coarse breath sounds bilaterally   Abdomen: Soft, diffuse abdominal pain, normal active bowel sounds  Extremities: No peripheral edema, warm  Skin: sternotomy scar across torso   Neuro: asleep, grossly non-focal           Data:     Labs reviewed.     CT C/A/P with IV and oral contrast (2019):  1. New diffuse bilateral solid with peripheral groundglass pulmonary  nodules/opacities and  mediastinal adenopathy. Constellation of  findings are highly suspicious for infection (typical or atypical  infection) in this immune suppressed patient.  2. New small ascites.  3 Nonspecific small area of poorly defined heterogeneous posterior  right hepatic parenchyma. This may be secondary to the hepatic  fibrosis. Attention on follow-up examination.  4. New compression deformity of T12 vertebral body, since 3/19/2019.        Medications     Medications    aspirin  81 mg Oral Daily      azithromycin  500 mg Oral Daily     balsalazide  1,500 mg Oral BID     ceFEPIme (MAXIPIME) IV  1 g Intravenous Q8H     DULoxetine  20 mg Oral Daily     enoxaparin  40 mg Subcutaneous Q24H     filgrastim (NEUPOGEN/GRANIX) subcutaneous  300 mcg Subcutaneous Once     fluticasone  1 spray Both Nostrils BID     ipratropium - albuterol 0.5 mg/2.5 mg/3 mL  3 mL Nebulization 4x daily     levothyroxine  100 mcg Oral Daily     magnesium oxide  400 mg Oral BID     melatonin  6 mg Oral At Bedtime     montelukast  10 mg Oral At Bedtime     nystatin  500,000 Units Oral 4x Daily     omeprazole  40 mg Oral QPM     piperacillin-tazobactam  4.5 g Intravenous Q6H     potassium chloride ER  40 mEq Oral BID     predniSONE  5 mg Oral BID w/meals     rosuvastatin  10 mg Oral Daily     tacrolimus  3 mg Oral QAM     tacrolimus  3.5 mg Oral QPM       dextrose 5% and 0.45% NaCl 50 mL/hr at 05/01/19 0026     ACE/ARB/ARNI NOT PRESCRIBED       BETA BLOCKER NOT PRESCRIBED             Changes Today:     - Transition antibiotics from zosyn to cefepime  - MRI thoracic spine   - Give dose of neupogen  - Further infectious workup including blood cultures, Ig levels        Assessment and Plan:     Everton Larios is a 54 year old male with a history of NICM s/p OHT (2/24/2019), paroxysmal atrial fibrillation, recent T12 fracture (4/21/2019), ulcerative colitis, Pierce's esophagus, hypothyroidism, GERD, BPH and depression who is admitted with Pseudomonas bacteremia, new T12 fracture and multiple new pulmonary groundglass nodules.     #. Pseudomonas bacteremia  Blood cx from 4/29 shows pseudomonas bacteremia. Etiology likely pulmonary (possible pseudomonas pneumonia) with CT Chest on 4/30 revealing new diffuse bilateral solid with peripheral groundglass pulmonary nodules/opacities and  mediastinal adenopathy.   - Transplant ID consulted; appreciate assistance   - Pending cx susceptibilities  - Repeat blood cx today  - Antibiotics              -  Cefepime 5/1-               - Zosyn 4/29-5/1              - One time tobramycin 240mg IV for double coverage although patient is clinically improving without clear need for prolonged double coverage               - Azithromycin 4/30-                             - Vancomycin 4/29-4/30    #. Pulmonary groundglass nodules  Notable productive cough of yellow sputum over the past few days, chest discomfort and sinus congestion noted. CXR with new nodular opacity overlying the right upper lobe. CT chest shows new diffuse bilateral solid with peripheral groundglass pulmonary nodules/opacities and  mediastinal adenopathy. Also concern for possible fungal infection.   - Pulmonology consulted; appreciate assistance  - Pending bronchoscopy cultures obtained 4/30/2019  - Transplant ID consulted; appreciate assistance  - Fungitell, Aspergillus galactomannan Ag ordered  - Fungal antibodies ordered  - Histoplasma U Ag ordered  - IgE, IgG, IgM, IgA ordered   - Respiratory viral panel pending  - Influenza A/B PCR negative   - Tessalon capsules prn   - Albuterol prn      #. Neutropenia   Afebrile and tachycardic to 120s on admission. Lactate wnl.  initially. UA unremarkable. Patient has noted subjective fevers at home in additional to upper respiratory tract symptoms, diffuse abdominal pain, ~5 episodes of non-bloody diarrhea in past 24 hours with stool softener use. Neutropenia likely in the setting of acute infection from bacteremia of respiratory (bacterial, fungal) or GI etiology (EBV, CMV, URI), medication-induced (bactrim, cellcept and valcyte). Hematological or rheumatological etiologies less likely. C.diff negative.   - Neupogen dose 5/1/2019  - Neutropenic precautions   - Transplant ID consulted; appreciate recommendations   - Held home cellcept, valcyte and bactrim  - CMV and EBV PCR pending  - D5 with 1/2NS at 50cc/hr   - Antibiotics              - Cefepime 5/1-               - Zosyn 4/29-5/1              - One time  tobramycin 240mg IV for double coverage although patient is clinically improving without clear need for prolonged double coverage               - Azithromycin 4/30-                             - Vancomycin 4/29-4/30    #. Abdominal pain  #. Non-bloody diarrhea   Lactate wnl. Pain predominantly epigastric, RUQ and LLQ on exam. Lipase wnl. Consider CMV colitis, C.diff, EBV, UC flare, typhilitis contributing to diarrhea and abdominal pain. CT A/P shows nonspecific small area of poorly defined heterogeneous posterior right hepatic parenchyma possibly secondary to the hepatic fibrosis.   - Zofran prn   - C. Diff and enteric panel negative   - CMV and EBV PCR pending     #. NICM s/p OHT (2/24/2019) with RV dysfunction  Most recent biopsy 4/24/2019 with 1 R mild focal acute cellular rejection with no AMR. Bactrim and Valcyte held since 4/26/2019 due to leukopenia. Serostatus: CMV+, EBV+, HSV1+, HSV2 neg, VZV+  - Immunosuppresives:                - Held home cellcept               - Continue tacrolimus 3mg qAM and 3.5mg qPM; goal of 8 - 10               - Continue prednisone 5mg BID   - PPx: held valcyte and bactrim in setting of neutropenia. Continue nystatin.   - Diuretics: held bumex 2mg BID in setting of acute infection  - ASA 81mg daily  - Rosuvastatin 10mg daily   - Tacrolimus level 4/29 was 9.6  - Pending CMV and EBV PCR   - Weekly CMV ordered  - Repeat heart biopsy on 5/8/2019     #. Ulcerative colitis: continue home balsalazide 1500mg BID  #. Hypothyroidism: continue levothyroxine   #. GERD and history of Pierce's esophagus: continue omeprazole   #. Depression: continue duloxetine   #. Seasonal allergies: azelastine 0.1% nasal spray, fluticasone, montelukast   #. Insomnia: continue melatonin     Diet/IVF: cardiac diet; D5 with 1/2NS @ 50cc/hr   DVT ppx: Lovenox subcutaneous daily ([place] 6)   Disposition/Admission Status: admitted for further pseudomonas bacteremia   CODE: Full Code    Patient was discussed with  staff attending, Dr. Hatch.    Dave Carlson MD PhD  Cardiology Fellow  P: [phone]

## 2019-05-01 NOTE — PROGRESS NOTES
[facility]  Transplant Infectious Disease Progress Note      Patient:  Everton Larios, Date of birth 1965, Medical record number [id number]  Date of Visit:  05/01/2019         Assessment and Recommendations:   Recommendations:  - OK to change zosyn (which is q6h) to cefepime (which is q8h) while awaiting sensitivities of Pseudomonas in 4/29/2019 BCx.   - Consider a single dose of 300 microgram subcutaneous dose of neupogen, since WBC remains suppressed (piperacillin component of zosyn may have played a role in decreasing WBC).   - Hold off on any further tobramycin doses at this time, as he did have a cr bump overnight, and there is an extended postantibiotic effect of tobra.  - Please check IgE, IgG, IgM, IgA. IgE may be elevated with a fungal infection, and low immune globulins may make recovery from this serious infection more difficult if we know about them being low.  - Continue azithromycin for new lung field abnormalities, although ok to change to oral now that he is feeling better.  - Continue nystatin for yeast prophylaxis.   - Await pending Fungitell, Aspergillus galactomannan antigen, fungal antibodies Histoplasma U Ag, 4/30/2019 BAL results.   - Ok to hold off on Pneumocystis prophylaxis for now, while allowing his WBC to recover from bactrim.   - Check CMV PCR weekly starting 5/6/2019, since valcyte is on hold due to neutropenia.     Transplant Infectious Disease will continue to follow with you.    Assessment:  Everton Larios is a 55 yo gentleman who underwent orthotopic heart transplant 2/24/19.  Infectious Disease issues include:  - 4/29/2019 Pseudomonas aeruginosa sepsis. Likely is the cause of his fever. He is responding clinically to zosyn (started 4/29/2019) and one dose of tobramycin 4/30/2019. 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 Would appreciate  primary cardiology team assessment of risk of clearing gram negative bloodstream infection with presence of retained metal ring in SVC. WBC remains suppressed (piperacillin component of zosyn may have played a role in decreasing WBC). OK to change zosyn (which is q6h) to cefepime (which is q8h) while awaiting sensitivities of Pseudomonas in 4/29/2019 BCx. Hold off on any further tobramycin doses at this time, as he did have a cr bump overnight, and there is an extended postantibiotic effect of tobra.  - 4/30/3019 CT with new scattered bilateral peribronchial solid opacities with peripheral groundglass appearance, largest measures 1.5 cm. He also has mediastinal adenopathy. Constellation of findings are highly suspicious for infection (typical or atypical infection). He has been started on vanco and azithromycin. DDx includes septic emboli, fungal infection, atypical mycobacterial infection. Await Fungitell, Aspergillus galactomannan antigen, fungal antibodies Histoplasma U Ag, IgE, IgG, IgM, IgA, 4/30/2019 BAL results.   - New compression deformity of T12 vertebral body, since 3/19/2019. Quantiferon negative prior to transplant. Consider MRI of T12 area, to see if there is any underlying bone changes around compression deformity that would indicate an osteomyelitis.  - Hx of Klebsiella oxytoca from sputum specimen 3/20/2019. He has retrocardiac opacity on 3/22/2019 pCXR, and nodular groundglass opacities in the right lung on 3/19/2019. He had a good clinical symptom response to zosyn.  - QTc interval prolonged: 586 msec on 3/11/2019 EKG. 512 msec on 4/30/2019 EKG. Have to avoid levaquin for now.   - Pneumocystis prophylaxis: pentamidine given 3/3/2019, then on Bactrim until 4/26/2019 when he had neutropenia. Ok to hold off on Pneumocystis prophylaxis for now, while allowing his WBC to recover from bactrim.   - Fungal prophylaxis: nystatin since he had some thrush.   - Viral serostatus & prophylaxis: CMV+, EBV+,  HSV1+, HSV2 neg, VZV+; holding valcyte due to neutropenia.   - Immunization status: to readdress at 6-months following transplant.   - Gamma globulin status: unknown  - Isolation status: Good hand hygiene.    Cari Sagastume MD   Pager [phone]         Interval History:   Since Everton was last seen by ID on 4/30/2019, he is feeling better. He had a bronch, and his breathing feels less tight since the procedure. He slept most of the day yesterday, and he is refreshed today. He is sitting in a bedside support chair, as he is quite deconditioned from all the events of the last few days. Pain is manageable, and he wants no pain meds since they constipate him. He has a spine appt on Monday 5/6/2019 outpt, and he thinks he may need to cancel that. The bacteria in his blood was identified as Pseudomonas. He received a dose of tobra in order to give 2-drug rx for Pseudomonas, but his Cr daly overnight, so will hold off on a dose today. He received zosyn as primary treatment of the Pseudomonas, but with Cr rise the zosyn may actually be on the too high side of dosing, and a side effect of the piperacillin component of zosyn is neutropenia, so it is not a surprise that his WBC dropped a bit.       Transplants:  2/24/2019 (Heart), Postoperative day:  66.  Coordinator Jocelynn Jerez    Review of Systems:  CONSTITUTIONAL:  No fevers, but chilling less  EYES: negative for icterus or acute vision changes  ENT:  negative for acute hearing loss, tinnitus, sore throat  RESPIRATORY:  Less cough, a little dyspnea / tightness with deeper breaths.  CARDIOVASCULAR:  No chest pain, no palpitations.   GASTROINTESTINAL:  less nausea,   GENITOURINARY:  negative for dysuria or hematuria  HEME:  + easy bruising but no bleeding  INTEGUMENT:  negative for rash or pruritus  NEURO:  Improved headache, still has tremor & actually a little worse.          Current Medications & Allergies:       aspirin  81 mg Oral Daily     azithromycin  500 mg  Intravenous Q24H     balsalazide  1,500 mg Oral BID     DULoxetine  20 mg Oral Daily     enoxaparin  40 mg Subcutaneous Q24H     fluticasone  1 spray Both Nostrils BID     ipratropium - albuterol 0.5 mg/2.5 mg/3 mL  3 mL Nebulization 4x daily     levothyroxine  100 mcg Oral Daily     magnesium oxide  400 mg Oral BID     melatonin  6 mg Oral At Bedtime     montelukast  10 mg Oral At Bedtime     nystatin  500,000 Units Oral 4x Daily     omeprazole  40 mg Oral QPM     piperacillin-tazobactam  4.5 g Intravenous Q6H     potassium chloride ER  40 mEq Oral BID     predniSONE  5 mg Oral BID w/meals     rosuvastatin  10 mg Oral Daily     tacrolimus  3 mg Oral QAM     tacrolimus  3.5 mg Oral QPM       Infusions/Drips:    dextrose 5% and 0.45% NaCl 50 mL/hr at 05/01/19 0026     ACE/ARB/ARNI NOT PRESCRIBED       BETA BLOCKER NOT PRESCRIBED         Allergies   Allergen Reactions     Hydromorphone Other (See Comments)     Significant Delirium     Lisinopril Other (See Comments)     hypotension     Codeine Nausea            Physical Exam:   Vitals were reviewed.  All vitals stable.  Patient Vitals for the past 24 hrs:   BP Temp Temp src Pulse Resp SpO2   05/01/19 0743 101/77 98.5  F (36.9  C) Oral -- 18 99 %   04/30/19 2352 101/77 97.9  F (36.6  C) Oral -- 18 98 %   04/30/19 1856 105/70 98.2  F (36.8  C) Oral -- 18 100 %   04/30/19 1518 96/64 98.3  F (36.8  C) Oral -- 16 95 %   04/30/19 1136 115/80 98.2  F (36.8  C) Oral -- 20 100 %   04/30/19 1120 98/82 -- -- -- -- --   04/30/19 1115 111/66 98.2  F (36.8  C) Oral 101 20 95 %   04/30/19 1114 -- -- -- -- 27 97 %   04/30/19 1110 108/81 -- -- 101 10 98 %   04/30/19 1105 107/69 -- -- 99 10 100 %   04/30/19 1103 111/77 -- -- 100 -- 100 %   04/30/19 1052 -- -- -- -- 18 97 %   04/30/19 1051 116/73 -- -- 114 -- --   04/30/19 1033 110/77 -- -- -- 20 97 %     Ranges for vital signs:  Temp:  [97.9  F (36.6  C)-98.5  F (36.9  C)] 98.5  F (36.9  C)  Pulse:  [] 101  Heart Rate:  []  94  Resp:  [10-27] 18  BP: ()/(64-82) 101/77  Cuff Mean (mmHg):  [85-94] 85  SpO2:  [95 %-100 %] 99 %  Vitals:    04/29/19 0837 04/29/19 1624 04/30/19 0407   Weight: 54.4 kg (120 lb) 56.1 kg (123 lb 9.6 oz) 56.4 kg (124 lb 6.4 oz)       Physical Examination:  GENERAL:  well-developed, well-nourished man, resting in bed in no acute distress.  HEAD:  Head is normocephalic, atraumatic   EYES:  Eyes have anicteric sclerae without conjunctival injection   ENT:  Oropharynx is moist without ulcers. Tongue is midline. Trace of thrush vs residual nystatin on tongue.   NECK:  Supple. No cervical lymphadenopathy  LUNGS:  Clear to auscultation bilateral.   CARDIOVASCULAR:  Regular rate and rhythm with no murmur. Chest tube sites scabbed as would be expected at this point after transplant.  ABDOMEN:  Normal bowel sounds, soft, minimally tender throughout.   SKIN:  No acute rashes. PIV Line in place without any surrounding erythema or exudate. Midline sternal incision healing as expected.  NEUROLOGIC:  Grossly nonfocal. Active x4 extremities         Laboratory Data:     Inflammatory Markers    Recent Labs   Lab Test 04/29/19  0911 11/15/18  0955 10/22/18  1239   .0* 6.1 12.5       Immune Globulin Studies  No lab results found.      Metabolic Studies       Recent Labs   Lab Test 05/01/19  0515 04/30/19  0523 04/29/19  0918 04/29/19  0911 04/26/19  0848  02/23/19  1835    134  --  131* 138   < > 133   POTASSIUM 4.0 3.4  --  3.5 3.8   < > 3.6   CHLORIDE 104 100  --  94 104   < > 93*   CO2 21 27  --  24 26   < > 30   ANIONGAP 11 8  --  12 9   < > 10   BUN 23 19  --  23 47*   < > 28   CR 1.36* 1.13  --  1.04 1.80*   < > 1.03   GFRESTIMATED 58* 72  --  80 41*   < > 81   * 104*  --  79 134*   < > 114*   A1C  --   --   --   --   --   --  5.7*   RADAMES 8.1* 8.6  --  9.0 8.7   < > 8.9   PHOS  --   --   --   --  3.6   < > 3.6   MAG 2.1 1.6  --   --  2.2   < > 2.0   LACT  --   --  1.7 1.6  --    < >  --    PCAL  --    --   --  0.09  --   --   --     < > = values in this interval not displayed.       Hepatic Studies    Recent Labs   Lab Test 04/29/19  0911 04/04/19  0840 04/01/19  0653  03/14/19  1653  03/10/19  0410   BILITOTAL 1.0 0.6 0.6   < >  --    < > 0.8   DBIL  --   --  0.4*   < > 0.4*  --   --    ALKPHOS 125 139 135   < >  --    < > 104   PROTTOTAL 7.1 5.8* 5.6*   < >  --    < > 5.0*   ALBUMIN 3.5 2.9* 2.8*   < >  --    < > 2.8*   AST 10 16 14   < >  --    < > 22   ALT 17 26 28   < >  --    < > 19   LDH  --   --   --   --  320*  --  276*    < > = values in this interval not displayed.       Pancreatitis testing    Recent Labs   Lab Test 04/29/19  0911 03/18/19  0505 02/23/19  1835 11/15/18  0955   AMYLASE  --  51 22*  --    LIPASE 42* 237  --   --    TRIG  --   --   --  112       Gout Labs      Recent Labs   Lab Test 11/15/18  0955   URIC 9.9*       Hematology Studies      Recent Labs   Lab Test 05/01/19  0515 04/30/19  0523 04/29/19  0911 04/26/19  0848 04/24/19  0903 04/17/19  1111   WBC 0.6* 1.2* 0.7* 0.7* 1.1* 5.2   ANEU 0.2* 0.6* 0.2* 0.3* 0.6*  --    ALYM 0.2* 0.4* 0.4* 0.4* 0.4*  --    GRACE 0.1 0.1 0.0 0.0 0.0  --    AEOS 0.0 0.0 0.0 0.0 0.1  --    HGB 8.0* 9.1* 10.8* 9.4* 9.9* 9.2*   HCT 26.3* 29.4* 35.3* 30.8* 33.2* 30.1*    195 242 169 196 187       Clotting Studies    Recent Labs   Lab Test 05/01/19  0515 04/30/19  0523 03/27/19  0521 03/26/19  0531  03/14/19  1653   INR 1.31* 1.51* 1.39* 1.38*   < > 1.36*   PTT  --   --   --   --   --  46*    < > = values in this interval not displayed.       Iron Testing    Recent Labs   Lab Test 05/01/19 0515 03/14/19  2116 03/14/19  1653  11/15/18  0955  10/11/18  1235   IRON  --   --   --   --   --   --   --  63   FEB  --   --   --   --   --   --   --  457*   IRONSAT  --   --   --   --   --   --   --  14*   NATE  --   --   --   --   --  1,045*  --   --    MCV 97   < > 94  --    < >  --    < > 87   B12  --   --   --   --   --  1,857*  --   --    HAPT  --   --   --   143  --   --   --   --    RETP  --   --  4.3* 4.1*   < >  --   --   --    RETICABSCT  --   --  100.8* 100.8*   < >  --   --   --     < > = values in this interval not displayed.       Arterial Blood Gas Testing    Recent Labs   Lab Test 03/11/19  1152 03/10/19  1214 03/09/19  0400 03/08/19  2147  02/25/19  1717 02/25/19  1502 02/25/19  0918  02/25/19  0350 02/25/19  0110   PH  --   --   --   --   --  7.32* 7.31* 7.33*  --  7.39 7.35   PCO2  --   --   --   --   --  36 51* 46*  --  42 42   PO2  --   --   --   --   --  110* 75* 125*  --  87 170*   HCO3  --   --   --   --   --  19* 26 24  --  25 23   O2PER 21 21 21 21.0   < > 3L 2  2 40.0  40.0   < > 40.0  40.0 60.0    < > = values in this interval not displayed.        Thyroid Studies     Recent Labs   Lab Test 03/09/19  1148 01/02/19  0906 11/20/18  0337 11/15/18  0955 10/11/18  1235   TSH 1.05 10.41*  --  15.97* 15.44*   T4  --  1.32 1.37  --  1.11       Urine Studies     Recent Labs   Lab Test 04/29/19  1031 03/26/19  1707 03/17/19  0045 03/04/19  1303 02/23/19  1921   URINEPH 6.0 5.5 5.5 5.0 6.0   NITRITE Negative Negative Negative Negative Negative   LEUKEST Negative Negative Negative Negative Negative   WBCU <1 2 15* 0 <1       Medication levels    Recent Labs   Lab Test 04/30/19  0523   TACROL 9.6       Microbiology:  Last Culture results with specimen source  Culture Micro   Date Value Ref Range Status   04/30/2019 (A)  Final    Canceled, Test credited  >10 Squamous epithelial cells/low power field indicates oral contamination. Please   recollect.     04/30/2019   Final    Notification of test cancellation was given to  ASHLIE SCOTT RN 1553 4.30.19 NDP     04/30/2019 Culture negative monitoring continues  Preliminary   04/30/2019 PENDING  Preliminary   04/30/2019 PENDING  Preliminary   04/30/2019 PENDING  Preliminary   04/30/2019 PENDING  Preliminary   04/30/2019 PENDING  Preliminary   04/30/2019 PENDING  Preliminary   04/30/2019 No growth after 20 hours   Preliminary   04/30/2019 No growth after 20 hours  Preliminary   04/29/2019 (A)  Final    >10 Squamous epithelial cells/low power field indicates oral contamination. Please   recollect.     04/29/2019 Canceled, Test credited  Final   04/29/2019   Final    Notification of test cancellation was given to  Parul Singh RN, @5728 04/29/19.DH.     04/29/2019 (A)  Preliminary    Cultured on the 1st day of incubation:  Pseudomonas aeruginosa     04/29/2019   Preliminary    Critical Value/Significant Value, preliminary result only, called to and read back by  Cheyenne Taylor RN [id number], @0848 on 4/30/19 [facility]     04/29/2019 Susceptibility testing in progress  Preliminary   04/29/2019   Preliminary    (Note)  POSITIVE for PSEUDOMONAS AERUGINOSA by Epoq multiplex nucleic  acid test. Final identification and antimicrobial susceptibility  testing will be verified by standard methods.    Specimen tested with Verigene multiplex, gram-negative blood culture  nucleic acid test for the following targets: Acinetobacter sp.,  Citrobacter sp., Enterobacter sp., Proteus sp., E. coli, K.  pneumoniae/oxytoca, P. aeruginosa, and the following resistance  markers: CTXM, KPC, NDM, VIM, IMP and OXA.    Critical Value/Significant Value called to and read back by Cheyenne Taylor RN @ 1100 4.30.19        04/29/2019 No growth  Final    Specimen Description   Date Value Ref Range Status   04/30/2019 Sputum  Final   04/30/2019 Sputum  Final   04/30/2019 Bronchoalveolar Lavage Left upper lobe  Final   04/30/2019 Bronchoalveolar Lavage Left upper lobe  Final   04/30/2019 Bronchoalveolar Lavage Left upper lobe  Final   04/30/2019 Bronchoalveolar Lavage Left upper lobe  Final   04/30/2019 Bronchoalveolar Lavage Left upper lobe  Final   04/30/2019 Bronchoalveolar Lavage Left upper lobe  Final   04/30/2019 Bronchoalveolar Lavage Lingula  Final   04/30/2019 Bronchoalveolar Lavage Lingula  Final   04/30/2019 Bronchoalveolar Lavage Lingula  Final    04/30/2019 Bronchoalveolar Lavage Lingula  Final   04/30/2019 Blood Right Arm  Final   04/30/2019 Blood Left Arm  Final   04/29/2019 Swab  Final   04/29/2019 Feces  Final   04/29/2019 Sputum  Final   04/29/2019 Sputum  Final   04/29/2019 Blood Left Arm  Final        Last check of C difficile  C Diff Toxin B PCR   Date Value Ref Range Status   04/29/2019 Negative NEG^Negative Final     Comment:     Negative: Clostridium difficile target DNA sequences NOT detected, presumed   negative for Clostridium difficile toxin B or the number of bacteria present   may be below the limit of detection for the test.  FDA approved assay performed using [facility] real-time PCR.  A negative result does not exclude actual disease due to Clostridium difficile   and may be due to improper collection, handling and storage of the specimen   or the number of organisms in the specimen is below the detection limit of the   assay.         Infection Studies to assess Diarrhea   Recent Labs   Lab Test 04/29/19  1511   EPCAMP Not Detected   EPSALM Not Detected   EPSHGL Not Detected   EPVIB Not Detected   EPROTA Not Detected   EPNORO Not Detected   EPYER Not Detected       Syphilis Testing    Treponema Antibodies   Date Value Ref Range Status   02/22/2019 Nonreactive NR^Nonreactive Final       Quantiferon testing   Recent Labs   Lab Test 02/22/19  1057   TBRES Negative       Virology:  Respiratory virus testing    Recent Labs   Lab Test 04/29/19  1900   IFLUA Negative   IFLUB Negative   INFZA Negative   INFZB Negative   FLUAH1 Negative   FLUAH3 Negative   BM5011 Negative   PIV1 Negative   PIV2 Negative   PIV3 Negative   HMPV Negative   HRVS Negative   RSVA Negative   RSVB Negative   IRSV Negative   RVSPEC Nasopharyngeal       Log IU/mL of CMVQNT   Date Value Ref Range Status   04/29/2019 Not Calculated <2.1 [Log_IU]/mL Final   04/26/2019 Not Calculated <2.1 [Log_IU]/mL Final   04/24/2019 Not Calculated <2.1 [Log_IU]/mL Final   04/17/2019  Not Calculated <2.1 [Log_IU]/mL Final   04/08/2019 Not Calculated <2.1 [Log_IU]/mL Final   04/01/2019 Not Calculated <2.1 [Log_IU]/mL Final   03/25/2019 Not Calculated <2.1 [Log_IU]/mL Final   03/18/2019 Not Calculated <2.1 [Log_IU]/mL Final       EBV DNA Copies/mL   Date Value Ref Range Status   04/29/2019 EBV DNA Not Detected EBVNEG^EBV DNA Not Detected [Copies]/mL Final   04/26/2019 EBV DNA Not Detected EBVNEG^EBV DNA Not Detected [Copies]/mL Final   03/27/2019 EBV DNA Not Detected EBVNEG^EBV DNA Not Detected [Copies]/mL Final       Imaging:    Recent Results (from the past 48 hour(s))   XR Chest 2 Views   Result Value    Radiologist flags Pulmonary nodules    Narrative    XR CHEST 2 VW  4/29/2019 12:34 PM      HISTORY: Chest pain    COMPARISON: 4/6/2019, 3/22/2019, 3/15/2019    FINDINGS: PA and lateral views of the chest. Postoperative changes  from cardiac transplantation. Stable heart size. Unchanged metallic  hyperdensity SVC. New nodular opacity overlying the right upper lobe.  Slightly decreased streaky right upper lobe opacities. No pneumothorax  or substantial pleural fluid.      Impression    IMPRESSION:   1. New nodular opacity overlying the right upper lobe, potentially  external to patient. Consider follow-up chest x-ray versus CT if  clinically indicated. Not present on recent chest x-ray and therefore  pneumonia is prime consideration.  2. Decreased right upper lobe atelectasis or infection.  3. Postoperative changes from cardiac transplantation with stable  heart size.    [Consider Follow Up: Pulmonary nodules]    This report will be copied to the [facility] to ensure a  provider acknowledges the finding.     I have personally reviewed the examination and initial interpretation  and I agree with the findings.    ZAC MIRZA MD   CT Chest/Abdomen/Pelvis w Contrast   Result Value    Radiologist flags Pulmonary opacities and T12 and vertebral body (Urgent)    Narrative    EXAMINATION:  CT CHEST/ABDOMEN/PELVIS W CONTRAST, 4/29/2019 9:07 PM    TECHNIQUE:  Helical CT images from the lung apices through the  symphysis pubis were obtained with IV contrast. Contrast dose:  iopamidol (ISOVUE-370) solution 76 mL. Additional oral contrast was  given.    COMPARISON: CT chest 3/19/2019, CT abdomen pelvis 2/26/2019    HISTORY: Sepsis; 53yo M with heart transplant (2/2019) admitted with  neutropenic fever, chest discomfort and abdominal pain (epigastric,  RUQ)    FINDINGS:    CHEST: Postoperative changes of of heart transplant. Left  brachiocephalic vein stent is in place and appears to be patent.  Intact thoracic aorta. No central pulmonary embolism. No pericardial  effusion. Mediastinal adenopathy. The central tracheobronchial tree is  patent. No pneumothorax or pleural effusion. New scattered bilateral  peribronchial solid opacities with peripheral groundglass appearance,  largest measures 1.5 cm. Flow artifact at the confluence of the right  internal jugular vein and subclavian vein. Retained metal ring in the  wall of the superior vena cava. Presumably part of the pacemaker lead  was removed at heart transplantation.    Abdomen and pelvis:     Unchanged few hypodense hepatic foci since prior. Small area of poorly  defined heterogeneous appearance of the hepatic parenchyma in the  posterior right hepatic lobe appearance. Pancreas, adrenal glands,  spleen are unremarkable. No hydronephrosis or nephroureterolithiasis.  Stable bilateral partially exophytic fat density renal foci, likely  angiomyolipoma. Unchanged focal left renal cortical atrophic. Urinary  bladder is partially distended and unremarkable. No pneumoperitoneum,  pneumatosis or portal venous gas. The abdominal aorta is intact.     Since 2/26/2019, new small ascites in the pelvis. Oral contrast  reached the transverse colon. No bowel obstruction.    Bones and soft tissues: Since 3/19/2019, new compression deformity of  T12 vertebral body, with  approximately 30-40% height loss. Stable  sternotomy wires.      Impression    IMPRESSION:   In this patient with history of heart transplantation,  1. New diffuse bilateral solid with peripheral groundglass pulmonary  nodules/opacities and  mediastinal adenopathy. Constellation of  findings are highly suspicious for infection (typical or atypical  infection) in this immune suppressed patient.  2. New small ascites.  3 Nonspecific small area of poorly defined heterogeneous posterior  right hepatic parenchyma. This may be secondary to the hepatic  fibrosis. Attention on follow-up examination.  4. New compression deformity of T12 vertebral body, since 3/19/2019.    [Urgent Result: Pulmonary opacities and T12 and vertebral body  compression fracture.]    Finding was identified on 4/29/2019 9:32 PM.     Dr. Sutherland was contacted by Dr. Perla Parham M.D.  at  4/29/2019 9:58 PM and verbalized understanding of the urgent finding.       I have personally reviewed the examination and initial interpretation  and I agree with the findings.    ZAC MIRZA MD

## 2019-05-01 NOTE — PROGRESS NOTES
05/01/19 0900   Quick Adds   Type of Visit Initial Occupational Therapy Evaluation   Living Environment   Lives With parent(s)   Living Arrangements house   Home Accessibility stairs to enter home;stairs within home   Number of Stairs, Main Entrance 5   Number of Stairs, Within Home, Primary other (see comments)  (12)   Stair Railings, Within Home, Primary railings on both sides of stairs   Transportation Anticipated family or friend will provide   Self-Care   Usual Activity Tolerance moderate   Current Activity Tolerance fair   Regular Exercise Yes   Activity/Exercise Type walking   Equipment Currently Used at Home cane, straight   Activity/Exercise/Self-Care Comment Pt reports stairs in home and walking within home. Walking in grocery store.   Functional Level   Ambulation 1-->assistive equipment   Transferring 1-->assistive equipment   Toileting 0-->independent   Bathing 1-->assistive equipment   Dressing 0-->independent   Eating 0-->independent   Communication 0-->understands/communicates without difficulty   Swallowing 0-->swallows foods/liquids without difficulty   Cognition 0 - no cognition issues reported   Fall history within last six months yes   Number of times patient has fallen within last six months 1   Which of the above functional risks had a recent onset or change? ambulation;transferring;fall history   General Information   Onset of Illness/Injury or Date of Surgery - Date 04/29/19   Referring Physician Jackson Turner MD   Patient/Family Goals Statement Pt would like to return home independently.    Additional Occupational Profile Info/Pertinent History of Current Problem Everton Larios is a 54 year old male with hx of NICM s/p orthotopic heart transplant (2/24/19), pAF, recent T12 fracture (4/21/19), hx of ulcerative colitis, history of Pierce's esophagus, and hypertension who was admitted for neutropenic fever, URI symptoms and abdominal pain.  Pulmonary consulted for consideration  of bronchoscopy in the setting of new infiltrates on CT and neutropenia.   Precautions/Limitations fall precautions;immunosuppressed;spinal precautions   Weight-Bearing Status - LUE full weight-bearing   Weight-Bearing Status - RUE full weight-bearing   Weight-Bearing Status - LLE full weight-bearing   Weight-Bearing Status - RLE full weight-bearing   Cognitive Status Examination   Orientation orientation to person, place and time   Level of Consciousness alert   Visual Perception   Visual Perception No deficits were identified;Wears glasses   Sensory Examination   Sensory Quick Adds No deficits were identified   Pain Assessment   Patient Currently in Pain No   Integumentary/Edema   Integumentary/Edema no deficits were identifed   Range of Motion (ROM)   ROM Quick Adds No deficits were identified   Strength   Manual Muscle Testing Quick Adds No deficits were identified   Mobility   Bed Mobility Comments SBA   Transfer Skills   Transfer Comments SBA and vc's with IV pole.    Activities of Daily Living Analysis   Impairments Contributing to Impaired Activities of Daily Living strength decreased  (Decreased endurance.)   General Therapy Interventions   Planned Therapy Interventions ADL retraining;IADL retraining;bed mobility training;ROM;strengthening;stretching;transfer training;home program guidelines;progressive activity/exercise   Clinical Impression   Criteria for Skilled Therapeutic Interventions Met yes, treatment indicated   OT Diagnosis Decreased functional endurance and strength for ADLs/IADLs.    Influenced by the following impairments Decreased strength/endurance.    Assessment of Occupational Performance 1-3 Performance Deficits   Identified Performance Deficits Decreased tolerance for ADLs/IADLs.    Clinical Decision Making (Complexity) Low complexity   Therapy Frequency daily   Predicted Duration of Therapy Intervention (days/wks) 5/8/2019   Anticipated Discharge Disposition Home with Outpatient Therapy  "  Risks and Benefits of Treatment have been explained. Yes   Patient, Family & other staff in agreement with plan of care Yes   [facility] AM-PAC  \"6 Clicks\" Daily Activity Inpatient Short Form   1. Putting on and taking off regular lower body clothing? 4 - None   2. Bathing (including washing, rinsing, drying)? 3 - A Little   3. Toileting, which includes using toilet, bedpan or urinal? 4 - None   4. Putting on and taking off regular upper body clothing? 4 - None   5. Taking care of personal grooming such as brushing teeth? 4 - None   6. Eating meals? 4 - None   Daily Activity Raw Score (Score out of 24.Lower scores equate to lower levels of function) 23   Total Evaluation Time   Total Evaluation Time (Minutes) 10      05/01/19 0900   Quick Adds   Type of Visit Initial Occupational Therapy Evaluation   Living Environment   Lives With parent(s)   Living Arrangements house   Home Accessibility stairs to enter home;stairs within home   Number of Stairs, Main Entrance 5   Number of Stairs, Within Home, Primary other (see comments)  (12)   Stair Railings, Within Home, Primary railings on both sides of stairs   Transportation Anticipated family or friend will provide   Self-Care   Usual Activity Tolerance moderate   Current Activity Tolerance fair   Regular Exercise Yes   Activity/Exercise Type walking   Equipment Currently Used at Home cane, straight   Activity/Exercise/Self-Care Comment Pt reports stairs in home and walking within home. Walking in grocery store.   Functional Level   Ambulation 1-->assistive equipment   Transferring 1-->assistive equipment   Toileting 0-->independent   Bathing 1-->assistive equipment   Dressing 0-->independent   Eating 0-->independent   Communication 0-->understands/communicates without difficulty   Swallowing 0-->swallows foods/liquids without difficulty   Cognition 0 - no cognition issues reported   Fall history within last six months yes   Number of times patient has fallen " within last six months 1   Which of the above functional risks had a recent onset or change? ambulation;transferring;fall history   General Information   Onset of Illness/Injury or Date of Surgery - Date 04/29/19   Referring Physician Jackson Turner MD   Patient/Family Goals Statement Pt would like to return home independently.    Additional Occupational Profile Info/Pertinent History of Current Problem Everton Larios is a 54 year old male with hx of NICM s/p orthotopic heart transplant (2/24/19), pAF, recent T12 fracture (4/21/19), hx of ulcerative colitis, history of Pierce's esophagus, and hypertension who was admitted for neutropenic fever, URI symptoms and abdominal pain.  Pulmonary consulted for consideration of bronchoscopy in the setting of new infiltrates on CT and neutropenia.   Precautions/Limitations fall precautions;immunosuppressed;spinal precautions   Weight-Bearing Status - LUE full weight-bearing   Weight-Bearing Status - RUE full weight-bearing   Weight-Bearing Status - LLE full weight-bearing   Weight-Bearing Status - RLE full weight-bearing   Cognitive Status Examination   Orientation orientation to person, place and time   Level of Consciousness alert   Visual Perception   Visual Perception No deficits were identified;Wears glasses   Sensory Examination   Sensory Quick Adds No deficits were identified   Pain Assessment   Patient Currently in Pain No   Integumentary/Edema   Integumentary/Edema no deficits were identifed   Range of Motion (ROM)   ROM Quick Adds No deficits were identified   Strength   Manual Muscle Testing Quick Adds No deficits were identified   Mobility   Bed Mobility Comments SBA   Transfer Skills   Transfer Comments SBA and vc's with IV pole.    Activities of Daily Living Analysis   Impairments Contributing to Impaired Activities of Daily Living strength decreased  (Decreased endurance.)   General Therapy Interventions   Planned Therapy Interventions ADL  "retraining;IADL retraining;bed mobility training;ROM;strengthening;stretching;transfer training;home program guidelines;progressive activity/exercise   Clinical Impression   Criteria for Skilled Therapeutic Interventions Met yes, treatment indicated   OT Diagnosis Decreased functional endurance and strength for ADLs/IADLs.    Influenced by the following impairments Decreased strength/endurance.    Assessment of Occupational Performance 1-3 Performance Deficits   Identified Performance Deficits Decreased tolerance for ADLs/IADLs.    Clinical Decision Making (Complexity) Low complexity   Therapy Frequency daily   Predicted Duration of Therapy Intervention (days/wks) 5/8/2019   Anticipated Discharge Disposition Home with Outpatient Therapy   Risks and Benefits of Treatment have been explained. Yes   Patient, Family & other staff in agreement with plan of care Yes   [facility] AM-PAC  \"6 Clicks\" Daily Activity Inpatient Short Form   1. Putting on and taking off regular lower body clothing? 4 - None   2. Bathing (including washing, rinsing, drying)? 3 - A Little   3. Toileting, which includes using toilet, bedpan or urinal? 4 - None   4. Putting on and taking off regular upper body clothing? 4 - None   5. Taking care of personal grooming such as brushing teeth? 4 - None   6. Eating meals? 4 - None   Daily Activity Raw Score (Score out of 24.Lower scores equate to lower levels of function) 23   Total Evaluation Time   Total Evaluation Time (Minutes) 10     "

## 2019-05-01 NOTE — PLAN OF CARE
D: Bacteremia. Neutropenic.     I/A: VSS. Pain manageable--declined pain medication this shift. Sinus tach. IV fluids running (D5 1/2 NS) at 50/hr. Slept most of shift. Critical labs this morning WBC 0.6 called to MD. JACKMAN.    P: Continue to monitor.

## 2019-05-02 LAB
1,3 BETA GLUCAN SER-MCNC: 109 PG/ML
ACID FAST STN SPEC QL: NORMAL
ANION GAP SERPL CALCULATED.3IONS-SCNC: 10 MMOL/L (ref 3–14)
ANION GAP SERPL CALCULATED.3IONS-SCNC: 13 MMOL/L (ref 3–14)
ANISOCYTOSIS BLD QL SMEAR: SLIGHT
ASPERGILLUS GALACTOMANNAN ANTIGEN BAL: NEGATIVE
B-D GLUCAN INTERPRETATION (1,3): POSITIVE
BACTERIA SPEC CULT: ABNORMAL
BASOPHILS # BLD AUTO: 0 10E9/L (ref 0–0.2)
BASOPHILS NFR BLD AUTO: 2.7 %
BUN SERPL-MCNC: 25 MG/DL (ref 7–30)
BUN SERPL-MCNC: 26 MG/DL (ref 7–30)
BURR CELLS BLD QL SMEAR: SLIGHT
CALCIUM SERPL-MCNC: 8.6 MG/DL (ref 8.5–10.1)
CALCIUM SERPL-MCNC: 8.7 MG/DL (ref 8.5–10.1)
CHLORIDE SERPL-SCNC: 106 MMOL/L (ref 94–109)
CHLORIDE SERPL-SCNC: 107 MMOL/L (ref 94–109)
CMV DNA SPEC NAA+PROBE-ACNC: NORMAL [IU]/ML
CMV DNA SPEC NAA+PROBE-LOG#: NORMAL {LOG_IU}/ML
CO2 SERPL-SCNC: 16 MMOL/L (ref 20–32)
CO2 SERPL-SCNC: 20 MMOL/L (ref 20–32)
COPATH REPORT: NORMAL
COPATH REPORT: NORMAL
CREAT SERPL-MCNC: 1.84 MG/DL (ref 0.66–1.25)
CREAT SERPL-MCNC: 1.99 MG/DL (ref 0.66–1.25)
CRP SERPL-MCNC: 69 MG/L (ref 0–8)
DIFFERENTIAL METHOD BLD: ABNORMAL
EOSINOPHIL # BLD AUTO: 0.1 10E9/L (ref 0–0.7)
EOSINOPHIL NFR BLD AUTO: 3.6 %
ERYTHROCYTE [DISTWIDTH] IN BLOOD BY AUTOMATED COUNT: 16.6 % (ref 10–15)
ERYTHROCYTE [SEDIMENTATION RATE] IN BLOOD BY WESTERGREN METHOD: 37 MM/H (ref 0–20)
GALACTOMANNAN AG SERPL QL IA: NEGATIVE
GALACTOMANNAN AG SERPL-ACNC: 0.05
GALACTOMANNAN AG SERPL-ACNC: 0.22
GFR SERPL CREATININE-BSD FRML MDRD: 36 ML/MIN/{1.73_M2}
GFR SERPL CREATININE-BSD FRML MDRD: 40 ML/MIN/{1.73_M2}
GLUCOSE SERPL-MCNC: 112 MG/DL (ref 70–99)
GLUCOSE SERPL-MCNC: 77 MG/DL (ref 70–99)
GRAM STN SPEC: ABNORMAL
HCT VFR BLD AUTO: 26.3 % (ref 40–53)
HGB BLD-MCNC: 8.1 G/DL (ref 13.3–17.7)
IGA SERPL-MCNC: 106 MG/DL (ref 70–380)
IGG SERPL-MCNC: 358 MG/DL (ref 695–1620)
IGM SERPL-MCNC: 29 MG/DL (ref 60–265)
INR PPP: 1.27 (ref 0.86–1.14)
LYMPHOCYTES # BLD AUTO: 0.5 10E9/L (ref 0.8–5.3)
LYMPHOCYTES NFR BLD AUTO: 30.6 %
Lab: ABNORMAL
Lab: ABNORMAL
MAGNESIUM SERPL-MCNC: 1.9 MG/DL (ref 1.6–2.3)
MAGNESIUM SERPL-MCNC: 2 MG/DL (ref 1.6–2.3)
MCH RBC QN AUTO: 30 PG (ref 26.5–33)
MCHC RBC AUTO-ENTMCNC: 30.8 G/DL (ref 31.5–36.5)
MCV RBC AUTO: 97 FL (ref 78–100)
METAMYELOCYTES # BLD: 0 10E9/L
METAMYELOCYTES NFR BLD MANUAL: 1.8 %
MONOCYTES # BLD AUTO: 0.4 10E9/L (ref 0–1.3)
MONOCYTES NFR BLD AUTO: 22.5 %
MYELOCYTES # BLD: 0 10E9/L
MYELOCYTES NFR BLD MANUAL: 1.8 %
NEUTROPHILS # BLD AUTO: 0.6 10E9/L (ref 1.6–8.3)
NEUTROPHILS NFR BLD AUTO: 37 %
OVALOCYTES BLD QL SMEAR: ABNORMAL
PLATELET # BLD AUTO: 189 10E9/L (ref 150–450)
PLATELET # BLD EST: ABNORMAL 10*3/UL
POIKILOCYTOSIS BLD QL SMEAR: ABNORMAL
POTASSIUM SERPL-SCNC: 4.5 MMOL/L (ref 3.4–5.3)
POTASSIUM SERPL-SCNC: 5 MMOL/L (ref 3.4–5.3)
RBC # BLD AUTO: 2.7 10E12/L (ref 4.4–5.9)
SODIUM SERPL-SCNC: 136 MMOL/L (ref 133–144)
SODIUM SERPL-SCNC: 137 MMOL/L (ref 133–144)
SPECIMEN SOURCE: ABNORMAL
SPECIMEN SOURCE: NORMAL
VANCOMYCIN SERPL-MCNC: 10 MG/L
WBC # BLD AUTO: 1.7 10E9/L (ref 4–11)

## 2019-05-02 PROCEDURE — 25000128 H RX IP 250 OP 636: Performed by: INTERNAL MEDICINE

## 2019-05-02 PROCEDURE — 80048 BASIC METABOLIC PNL TOTAL CA: CPT | Performed by: INTERNAL MEDICINE

## 2019-05-02 PROCEDURE — 94640 AIRWAY INHALATION TREATMENT: CPT | Mod: 76

## 2019-05-02 PROCEDURE — 25000132 ZZH RX MED GY IP 250 OP 250 PS 637: Performed by: INTERNAL MEDICINE

## 2019-05-02 PROCEDURE — 25000128 H RX IP 250 OP 636: Performed by: HOSPITALIST

## 2019-05-02 PROCEDURE — 85652 RBC SED RATE AUTOMATED: CPT | Performed by: INTERNAL MEDICINE

## 2019-05-02 PROCEDURE — 87205 SMEAR GRAM STAIN: CPT | Performed by: SURGERY

## 2019-05-02 PROCEDURE — 25000128 H RX IP 250 OP 636: Performed by: STUDENT IN AN ORGANIZED HEALTH CARE EDUCATION/TRAINING PROGRAM

## 2019-05-02 PROCEDURE — 80048 BASIC METABOLIC PNL TOTAL CA: CPT | Performed by: STUDENT IN AN ORGANIZED HEALTH CARE EDUCATION/TRAINING PROGRAM

## 2019-05-02 PROCEDURE — 85025 COMPLETE CBC W/AUTO DIFF WBC: CPT | Performed by: INTERNAL MEDICINE

## 2019-05-02 PROCEDURE — 99233 SBSQ HOSP IP/OBS HIGH 50: CPT | Mod: GC | Performed by: INTERNAL MEDICINE

## 2019-05-02 PROCEDURE — 36415 COLL VENOUS BLD VENIPUNCTURE: CPT | Performed by: INTERNAL MEDICINE

## 2019-05-02 PROCEDURE — 40000275 ZZH STATISTIC RCP TIME EA 10 MIN

## 2019-05-02 PROCEDURE — 25000132 ZZH RX MED GY IP 250 OP 250 PS 637: Performed by: HOSPITALIST

## 2019-05-02 PROCEDURE — 83735 ASSAY OF MAGNESIUM: CPT | Performed by: INTERNAL MEDICINE

## 2019-05-02 PROCEDURE — 80202 ASSAY OF VANCOMYCIN: CPT | Performed by: INTERNAL MEDICINE

## 2019-05-02 PROCEDURE — 36415 COLL VENOUS BLD VENIPUNCTURE: CPT | Performed by: STUDENT IN AN ORGANIZED HEALTH CARE EDUCATION/TRAINING PROGRAM

## 2019-05-02 PROCEDURE — 85610 PROTHROMBIN TIME: CPT | Performed by: INTERNAL MEDICINE

## 2019-05-02 PROCEDURE — 94640 AIRWAY INHALATION TREATMENT: CPT

## 2019-05-02 PROCEDURE — 25800030 ZZH RX IP 258 OP 636: Performed by: INTERNAL MEDICINE

## 2019-05-02 PROCEDURE — 25000125 ZZHC RX 250: Performed by: INTERNAL MEDICINE

## 2019-05-02 PROCEDURE — 25000131 ZZH RX MED GY IP 250 OP 636 PS 637: Performed by: INTERNAL MEDICINE

## 2019-05-02 PROCEDURE — 86140 C-REACTIVE PROTEIN: CPT | Performed by: INTERNAL MEDICINE

## 2019-05-02 PROCEDURE — 40000274 ZZH STATISTIC RCP CONSULT EA 30 MIN

## 2019-05-02 PROCEDURE — 21400000 ZZH R&B CCU UMMC

## 2019-05-02 PROCEDURE — 87040 BLOOD CULTURE FOR BACTERIA: CPT | Performed by: STUDENT IN AN ORGANIZED HEALTH CARE EDUCATION/TRAINING PROGRAM

## 2019-05-02 PROCEDURE — 83735 ASSAY OF MAGNESIUM: CPT | Performed by: STUDENT IN AN ORGANIZED HEALTH CARE EDUCATION/TRAINING PROGRAM

## 2019-05-02 RX ORDER — VANCOMYCIN HYDROCHLORIDE 1 G/200ML
1000 INJECTION, SOLUTION INTRAVENOUS ONCE
Status: DISCONTINUED | OUTPATIENT
Start: 2019-05-02 | End: 2019-05-02 | Stop reason: CLARIF

## 2019-05-02 RX ORDER — CEFTAZIDIME 1 G/1
1 INJECTION, POWDER, FOR SOLUTION INTRAMUSCULAR; INTRAVENOUS EVERY 12 HOURS
Status: DISCONTINUED | OUTPATIENT
Start: 2019-05-02 | End: 2019-05-06

## 2019-05-02 RX ORDER — BUMETANIDE 0.25 MG/ML
2 INJECTION INTRAMUSCULAR; INTRAVENOUS 2 TIMES DAILY
Status: DISCONTINUED | OUTPATIENT
Start: 2019-05-02 | End: 2019-05-03

## 2019-05-02 RX ORDER — MAGNESIUM SULFATE HEPTAHYDRATE 40 MG/ML
4 INJECTION, SOLUTION INTRAVENOUS EVERY 4 HOURS PRN
Status: DISCONTINUED | OUTPATIENT
Start: 2019-05-02 | End: 2019-05-02

## 2019-05-02 RX ORDER — AZITHROMYCIN 500 MG/1
500 TABLET, FILM COATED ORAL DAILY
Status: DISCONTINUED | OUTPATIENT
Start: 2019-05-03 | End: 2019-05-02

## 2019-05-02 RX ADMIN — NYSTATIN 500000 UNITS: 100000 SUSPENSION ORAL at 09:29

## 2019-05-02 RX ADMIN — NYSTATIN 500000 UNITS: 100000 SUSPENSION ORAL at 17:39

## 2019-05-02 RX ADMIN — ENOXAPARIN SODIUM 40 MG: 40 INJECTION SUBCUTANEOUS at 15:05

## 2019-05-02 RX ADMIN — CEFEPIME 1 G: 1 INJECTION, POWDER, FOR SOLUTION INTRAMUSCULAR; INTRAVENOUS at 03:47

## 2019-05-02 RX ADMIN — TACROLIMUS 3.5 MG: 1 CAPSULE ORAL at 17:38

## 2019-05-02 RX ADMIN — Medication 2 G: at 19:58

## 2019-05-02 RX ADMIN — IPRATROPIUM BROMIDE AND ALBUTEROL SULFATE 3 ML: .5; 3 SOLUTION RESPIRATORY (INHALATION) at 08:16

## 2019-05-02 RX ADMIN — FILGRASTIM 300 MCG: 300 INJECTION, SOLUTION INTRAVENOUS; SUBCUTANEOUS at 13:30

## 2019-05-02 RX ADMIN — IPRATROPIUM BROMIDE AND ALBUTEROL SULFATE 3 ML: .5; 3 SOLUTION RESPIRATORY (INHALATION) at 17:04

## 2019-05-02 RX ADMIN — TACROLIMUS 3 MG: 1 CAPSULE ORAL at 09:29

## 2019-05-02 RX ADMIN — PREDNISONE 5 MG: 5 TABLET ORAL at 09:30

## 2019-05-02 RX ADMIN — MAGNESIUM OXIDE TAB 400 MG (241.3 MG ELEMENTAL MG) 400 MG: 400 (241.3 MG) TAB at 19:57

## 2019-05-02 RX ADMIN — ASPIRIN 81 MG CHEWABLE TABLET 81 MG: 81 TABLET CHEWABLE at 09:30

## 2019-05-02 RX ADMIN — OMEPRAZOLE 40 MG: 20 CAPSULE, DELAYED RELEASE ORAL at 19:57

## 2019-05-02 RX ADMIN — MONTELUKAST SODIUM 10 MG: 10 TABLET, COATED ORAL at 21:13

## 2019-05-02 RX ADMIN — ACETAMINOPHEN 650 MG: 325 TABLET, FILM COATED ORAL at 19:57

## 2019-05-02 RX ADMIN — FLUTICASONE PROPIONATE 1 SPRAY: 50 SPRAY, METERED NASAL at 09:31

## 2019-05-02 RX ADMIN — ROSUVASTATIN CALCIUM 10 MG: 10 TABLET, FILM COATED ORAL at 09:30

## 2019-05-02 RX ADMIN — ACETAMINOPHEN 650 MG: 325 TABLET, FILM COATED ORAL at 23:47

## 2019-05-02 RX ADMIN — MAGNESIUM OXIDE TAB 400 MG (241.3 MG ELEMENTAL MG) 400 MG: 400 (241.3 MG) TAB at 09:30

## 2019-05-02 RX ADMIN — BUMETANIDE 2 MG: 0.25 INJECTION INTRAMUSCULAR; INTRAVENOUS at 19:57

## 2019-05-02 RX ADMIN — ACETAMINOPHEN 650 MG: 325 TABLET, FILM COATED ORAL at 09:30

## 2019-05-02 RX ADMIN — VANCOMYCIN HYDROCHLORIDE 750 MG: 10 INJECTION, POWDER, LYOPHILIZED, FOR SOLUTION INTRAVENOUS at 17:40

## 2019-05-02 RX ADMIN — BALSALAZIDE DISODIUM 1500 MG: 750 CAPSULE ORAL at 19:57

## 2019-05-02 RX ADMIN — ACETAMINOPHEN 650 MG: 325 TABLET, FILM COATED ORAL at 13:42

## 2019-05-02 RX ADMIN — MELATONIN TAB 3 MG 6 MG: 3 TAB at 21:13

## 2019-05-02 RX ADMIN — DULOXETINE 20 MG: 20 CAPSULE, DELAYED RELEASE ORAL at 09:30

## 2019-05-02 RX ADMIN — BUMETANIDE 2 MG: 0.25 INJECTION INTRAMUSCULAR; INTRAVENOUS at 13:30

## 2019-05-02 RX ADMIN — AZITHROMYCIN MONOHYDRATE 500 MG: 500 TABLET ORAL at 09:30

## 2019-05-02 RX ADMIN — NYSTATIN 500000 UNITS: 100000 SUSPENSION ORAL at 19:58

## 2019-05-02 RX ADMIN — CEFTAZIDIME 1 G: 1 INJECTION, POWDER, FOR SOLUTION INTRAMUSCULAR; INTRAVENOUS at 13:29

## 2019-05-02 RX ADMIN — FLUTICASONE PROPIONATE 1 SPRAY: 50 SPRAY, METERED NASAL at 19:59

## 2019-05-02 RX ADMIN — LEVOTHYROXINE SODIUM 100 MCG: 25 TABLET ORAL at 09:30

## 2019-05-02 RX ADMIN — IPRATROPIUM BROMIDE AND ALBUTEROL SULFATE 3 ML: .5; 3 SOLUTION RESPIRATORY (INHALATION) at 20:15

## 2019-05-02 RX ADMIN — PREDNISONE 5 MG: 5 TABLET ORAL at 17:38

## 2019-05-02 RX ADMIN — NYSTATIN 500000 UNITS: 100000 SUSPENSION ORAL at 13:29

## 2019-05-02 RX ADMIN — BALSALAZIDE DISODIUM 1500 MG: 750 CAPSULE ORAL at 09:29

## 2019-05-02 RX ADMIN — CEFTAZIDIME 1 G: 1 INJECTION, POWDER, FOR SOLUTION INTRAMUSCULAR; INTRAVENOUS at 23:35

## 2019-05-02 ASSESSMENT — PAIN DESCRIPTION - DESCRIPTORS
DESCRIPTORS: DISCOMFORT
DESCRIPTORS: ACHING;SORE
DESCRIPTORS: HEADACHE

## 2019-05-02 ASSESSMENT — MIFFLIN-ST. JEOR
SCORE: 1417.76
SCORE: 1413.23

## 2019-05-02 ASSESSMENT — ACTIVITIES OF DAILY LIVING (ADL)
ADLS_ACUITY_SCORE: 14
ADLS_ACUITY_SCORE: 15
ADLS_ACUITY_SCORE: 14
ADLS_ACUITY_SCORE: 14

## 2019-05-02 NOTE — PLAN OF CARE
D: Bacteremia. Neutropenic.      I/A: VSS. PRN Tylenol for pain. Sinus tach. IV fluids running (D5 1/2 NS) at 50/hr. Slept most of shift. Patient's weight is up 10 lbs this morning. MD notified. NNO at this time. Day shift MD team to follow up with patient.     P: Continue to monitor.

## 2019-05-02 NOTE — PLAN OF CARE
Pt s/p heart transplant and bacteremia continues on IVF at 50 ml/hr and IV antibiotics. WBC 0.6, neupogen subcutaneous given x 1. He had a spinal MRI done today and continues to have some back pain though he moves well independently in the room. VSS, ST 100s-115.Sats 100% on RA. Frequent small voids.

## 2019-05-02 NOTE — PLAN OF CARE
D/A/I:  Patient A&O x4, denied palpitations, dizziness, and nausea.  Reported general dyspnea, fine crackles auscultated in right lung base.  Also reported abdominal fullness and stated he had little to no urine output since 1700 the day prior.  Patient was able to void this morning, post-void bladder scan showed 150 ml.  Notified MD team, D51/2NS infusion stopped, patient given IV bumetanide.  Patient had adequate urine output, see I&O flowsheet.  In sinus tachycardia with BBB, HR 100s, SBP 130s, SaO2 % on room air.  Reported head and back pain that was managed with prn acetaminophen and heat packs.  Sputum sample sent to lab, see Chart Review for results.  Neutropenic precautions lifted during shift.  Ambulated in room independently with steady gait.    P:  Continue to monitor pain, VS, heart rhythm, fluid status, bowel status, cardiac and respiratory status.  Notify care team of changes in patient condition or other concerns.  Replace magnesium per protocol.  Provide diuresis to regain euvolemia.

## 2019-05-02 NOTE — PROGRESS NOTES
Post Transplant Patient Social Work Assessment     Patient Name: Everton Larios  : 1965  Age: 54 year old  MRN: [id number]  Date of transplant: 19    Patient known to me from follow up in the transplant program.  Admitted on 2019 for neutropenia. Pt is s/p heart transplant on 19, transition to ARU on 4/3/19 and discharged home on 19. Pt sustained compression fracture falling fall on 19. Seen today to update assessment.      Presenting Information   Living Situation: Pt has been living with his parents since discharge from ARU.   Functional Status: Pt reports he has been able to be independent w/ ADLs and is walking with a cane. Pt had been doing well prior to fall, on , and now more limited by pain.   Cultural/Language/Spiritual Considerations: None     Support System  Primary Support Person: Pt's parents  Other support:  Brother  Plan for support in immediate post-hospital period: Pt plans to return to his parent's home as they have been providing 24hr care following discharge from ARU.     Health Care Directive  Decision Maker: Pt   Alternate Decision Maker: Pt identifies his parents as decision makers. Pt is not .   Health Care Directive: Pt has been given copies of HCD in the past and has not completed document. Discussed again today and pt declined.     Mental Health/Coping:   History of Mental Health: Depression-pt is currently on Cymbalta   History of Chemical Health: History prior to heart transplant. Denies drinking currently.   Current status: Pt reports his mood is stable. Has noted periods of irritability, but is attributing this to his medications.   Coping: Pt reports being at his parents home has gone well for the most part. They are supportive and providing good care. Pt is happy to be there.   Services Needed/Recommended: None currently    Financial   Income: Social Security Disability, fundraising   Impact of transplant on income: Pt not noting  significant impact at this time   Insurance and medication coverage: Yes   Financial concerns: None currently  Resources needed: None     Discharge Plan   Patient and family discharge goal: Pt plans to discharge back to his parents home, whom are providing caregiver duties post-transplant.   Barriers to discharge: Medical Stability     Education provided by SW: Social Work role inpatient setting and outpatient setting, availability of support groups    Assessment and recommendations and plan:      Pt was pleasant and actively engaged in assessment. Pt reports he had been doing well at his parent's home, following discharge from ARU, on 4/12. Pt sustained a compression fracture, on 4/21, after his brother's dog jumped on him and he fell. Prior to the fall, pt had been walking with a cane and doing stairs. Currently, pt reports he is limited by his back pain. Pt reports he has been able to remain independent with ADLs and ambulation using a cane. Pt had started cardiac rehab.     Pt expresses no specific concerns at this time and appreciative of SW visit. Plan is for pt to return to his parent's home.

## 2019-05-02 NOTE — PLAN OF CARE
"OT 6C: Pt strongly declining participation in therapy \"until they get this figured out\" referring to \"10# weight gain\". Pt reports significant SOB with short bouts of ambulation within room. Pt educated on importance of continued cardiac rehab and ways to modify activity; pt continuing to decline. Will reschedule for tomorrow to check in.  "

## 2019-05-02 NOTE — PROGRESS NOTES
Cardiology Progress Note  Everton Larios MRN: [id number]  Age: 54 year old, : 1965  05/02/19            Changes Today:     - Change Cefepime to Ceftazidime  - Consider adding 2nd anti-pseudomonal coverage tomorrow   - Start Vancomycin   - Stop Azithromycin  - B-D Glucan positive from , will discuss need for anti-fungal coverage given prolonged QTc (~510)   - Blood cultures x2  - Neupogen 300mcg x1  - Bumex 2mg IV BID, stop mIVF  - Check Tacrolimus level in AM  - Will need weekly CMV checked (next due , ordered)  - Will need routine heart biopsy next week (~)  - Check DSA next week (ordered for )        Assessment and Plan:     Everton Larios is a 54 year old male with a history of NICM s/p OHT (2019), paroxysmal atrial fibrillation, recent T12 fracture (2019), ulcerative colitis, Pierce's esophagus, hypothyroidism, GERD, BPH and depression who is admitted with Pseudomonas bacteremia, new T12 fracture and multiple new pulmonary groundglass nodules.      #. Pseudomonas bacteremia  Blood cx from  shows pseudomonas bacteremia. Etiology likely pulmonary (possible pseudomonas pneumonia) with CT Chest on  revealing new diffuse bilateral solid with peripheral groundglass pulmonary nodules/opacities and  mediastinal adenopathy.   - Transplant ID following; appreciate assistance   - Pending cx susceptibilities  - Repeat blood cx today  - Antibiotics:              - Ceftazidime  -               - Cefepime -               - Zosyn -              - One time tobramycin 240mg IV                - Azithromycin -                             - Vancomycin -,      #. Pulmonary groundglass nodules  Notable productive cough of yellow sputum over the past few days, chest discomfort and sinus congestion noted. CXR with new nodular opacity overlying the right upper lobe. CT chest shows new diffuse bilateral solid with  peripheral groundglass pulmonary nodules/opacities and  mediastinal adenopathy. Also concern for possible fungal infection.   - Pulmonology consulted; appreciate assistance   - Bronchoscopy cultures obtained 4/30/2019: growing pseudomonas   - Transplant ID consulted; appreciate assistance  - Aspergillus galactomannan Ag pending (4/30)  - B-D Glucan positive from 4/30, will discuss need for anti-fungal coverage given prolonged QTc (~510)    - Histoplasma U Ag pending  - IgG 106, IgM 29, IgA 106 from 5/1   - Influenza A/B, RSV PCR negative   - Tessalon capsules prn   - Albuterol prn, DuoNebs scheduled      #. Neutropenia   Afebrile and tachycardic to 120s on admission. Lactate wnl.  initially. UA unremarkable. Patient has noted subjective fevers at home in additional to upper respiratory tract symptoms, diffuse abdominal pain, ~5 episodes of non-bloody diarrhea in past 24 hours with stool softener use. Neutropenia likely in the setting of acute infection from bacteremia of respiratory (bacterial, fungal) or GI etiology (EBV, CMV, URI), medication-induced (bactrim, cellcept and valcyte). Hematological or rheumatological etiologies less likely. C.diff negative.   - Neupogen dose 5/1, 5/2  - Neutropenic precautions   - Transplant ID consulted; appreciate recommendations   - Held home cellcept, valcyte and bactrim  - CMV and EBV PCR negative 4/29, next check 3/7, ordered  - Antibiotics: see above     #. Abdominal pain  #. Non-bloody diarrhea   Lactate wnl. Pain predominantly epigastric, RUQ and LLQ on exam. Lipase wnl. Considered CMV colitis, C.diff, EBV, UC flare, typhilitis contributing to diarrhea and abdominal pain. CT A/P shows nonspecific small area of poorly defined heterogeneous posterior right hepatic parenchyma possibly secondary to the hepatic fibrosis.   - C. Diff and enteric panel negative   - CMV and EBV PCR 4/29 negative     #. NICM s/p OHT (2/24/2019) with RV dysfunction  Most recent biopsy  4/24/2019 with 1 R mild focal acute cellular rejection with no AMR. Bactrim and Valcyte held since 4/26/2019 due to leukopenia. Serostatus: CMV+, EBV+, HSV1+, HSV2 neg, VZV+.   - Immunosuppresives:                - Held home cellcept               - Continue tacrolimus 3mg qAM and 3.5mg qPM; goal of 8 - 10               - Continue prednisone 5mg BID   - PPx: held valcyte and bactrim in setting of neutropenia. Continue nystatin.   - Diuretics: bumex 2mg IV BID started 5/2  - ASA 81mg daily  - Rosuvastatin 10mg daily   - Tacrolimus level 4/29 was 9.6, next check 5/3  - Weekly CMV ordered  - Repeat heart biopsy on 5/8/2019  - Check DSA next week (ordered for 5/8)      #. Ulcerative colitis: continue home balsalazide 1500mg BID  #. Hypothyroidism: continue levothyroxine   #. GERD and history of Pierce's esophagus: continue omeprazole   #. Depression: continue duloxetine   #. Seasonal allergies: azelastine 0.1% nasal spray, fluticasone, montelukast   #. Insomnia: continue melatonin     Diet/IVF: cardiac diet, NO IVF  DVT ppx: Lovenox subcutaneous daily ([place] 6)   Disposition/Admission Status: admitted for further pseudomonas bacteremia   CODE: Full Code     Patient was discussed with staff attending, Dr. Hatch.    Giuseppe Fritz MD  PGY-2 Internal Medicine  Cardiology Service            Subjective     No acute events overnight. Seen this morning. He c/o abdominal distension, feels like he is retaining weight. Has no other concerns. Denies fevers, chills, chest pain, shortness of breath, abdominal pain, n/v, bowel or urinary changes.           Objective     Vitals:  Temp:  [98.1  F (36.7  C)-98.7  F (37.1  C)] 98.1  F (36.7  C)  Pulse:  [105] 105  Heart Rate:  [] 100  Resp:  [18] 18  BP: (101-131)/(75-88) 112/78  Cuff Mean (mmHg):  [88] 88  SpO2:  [97 %-100 %] 100 %  MAP: 80-90s    I/O: Net +1.9cc over past 24hrs    Vitals:    04/29/19 1624 04/30/19 0407 05/02/19 0300   Weight: 56.1 kg (123 lb 9.6 oz) 56.4  kg (124 lb 6.4 oz) 60.9 kg (134 lb 3.2 oz)       Gen: NAD, pleasant  HEENT: No scleral icterus, Moist mucous membranes.   CV:  RRR, S1 S2 nl, no murmurs, rubs or gallops   Resp: expiratory wheezing, no crackles   Abdomen: Soft, diffuse abdominal pain, normal active bowel sounds  Extremities: No peripheral edema, warm  Skin: sternotomy scar across torso   Neuro: asleep, grossly non-focal             Data:      Labs reviewed.           Medications     Medications    aspirin  81 mg Oral Daily     azithromycin  500 mg Oral Daily     balsalazide  1,500 mg Oral BID     ceFEPIme (MAXIPIME) IV  1 g Intravenous Q12H     DULoxetine  20 mg Oral Daily     enoxaparin  40 mg Subcutaneous Q24H     fluticasone  1 spray Both Nostrils BID     ipratropium - albuterol 0.5 mg/2.5 mg/3 mL  3 mL Nebulization 4x daily     levothyroxine  100 mcg Oral Daily     magnesium oxide  400 mg Oral BID     melatonin  6 mg Oral At Bedtime     montelukast  10 mg Oral At Bedtime     nystatin  500,000 Units Oral 4x Daily     omeprazole  40 mg Oral QPM     potassium chloride ER  40 mEq Oral BID     predniSONE  5 mg Oral BID w/meals     rosuvastatin  10 mg Oral Daily     tacrolimus  3 mg Oral QAM     tacrolimus  3.5 mg Oral QPM       dextrose 5% and 0.45% NaCl 50 mL/hr at 05/01/19 2272     ACE/ARB/ARNI NOT PRESCRIBED       BETA BLOCKER NOT PRESCRIBED

## 2019-05-02 NOTE — PROGRESS NOTES
[facility]  Transplant Infectious Disease Progress Note      Patient:  Everton Larios, Date of birth 1965, Medical record number [id number]  Date of Visit:  05/02/2019         Assessment and Recommendations:   Recommendations:  - OK to change cefepime to ceftazidime (both with the same dosing intervals) while awaiting sensitivities of Pseudomonas in 4/30/2019 bronch cx, since ceftaz is a little more sensitive than cefepime in 4/29/2019 BCx.   - Since he has had an extended rise in Cr in the setting of this illness and the one dose of tobramycin, I am going to list aminoglycosides as an allergy.  - Would check a blood uric acid level, since perhaps uric acid crystals are contributing to his renal dysfunction. 3/17/2019 UA had crystals, and 11/15/2018 uric acid level in blood was high at 9.9.   - Continue azithromycin for new lung field abnormalities, although ok to change to oral now that he is feeling better.  - Continue nystatin for yeast prophylaxis.   - Await pending immune globulins, Fungitell, Aspergillus galactomannan antigen, fungal antibodies Histoplasma U Ag, 4/30/2019 BAL results.   - Ok to hold off on Pneumocystis prophylaxis for now, while allowing his WBC to recover from bactrim.   - Check CMV PCR weekly starting 5/6/2019, since valcyte is on hold due to neutropenia.     Transplant Infectious Disease will continue to follow with you.    Assessment:  Everton Larios is a 55 yo gentleman who underwent orthotopic heart transplant 2/24/19.  Infectious Disease issues include:  - 4/29/2019 Pseudomonas aeruginosa sepsis. Likely is the cause of his fever. He is responding clinically to zosyn -> cefepime (started 4/29/2019) and one dose of tobramycin 4/30/2019. 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 Hold off on any further tobramycin doses at this time, as he did have  a cr bump. OK to change cefepime to ceftazidime (both with the same dosing intervals) while awaiting sensitivities of Pseudomonas in 4/30/2019 bronch cx, since ceftaz is a little more sensitive than cefepime in 4/29/2019 BCx. Since he has had an extended rise in Cr in the setting of this illness and the one dose of tobramycin, I am going to list aminoglycosides as an allergy.  - 4/30/3019 CT with new scattered bilateral peribronchial solid opacities with peripheral groundglass appearance, largest measures 1.5 cm. He also has mediastinal adenopathy. Constellation of findings are highly suspicious for infection (typical or atypical infection). DDx includes septic emboli from Pseudomonas, fungal infection, atypical mycobacterial infection. Await Fungitell, Aspergillus galactomannan antigen, fungal antibodies Histoplasma U Ag, IgE, IgG, IgM, IgA, 4/30/2019 BAL results.   - New compression deformity of T12 vertebral body, since 3/19/2019. Quantiferon negative prior to transplant. 5/1/2019 MRI of T12 area shows no osteomyelitis.  - Hx of Klebsiella oxytoca from sputum specimen 3/20/2019. He has retrocardiac opacity on 3/22/2019 pCXR, and nodular groundglass opacities in the right lung on 3/19/2019. He had a good clinical symptom response to zosyn.  - QTc interval prolonged: 586 msec on 3/11/2019 EKG. 512 msec on 4/30/2019 EKG. Have to avoid quinolones for now.   - Pneumocystis prophylaxis: pentamidine given 3/3/2019, then on Bactrim until 4/26/2019 when he had neutropenia. Ok to hold off on Pneumocystis prophylaxis for now, while allowing his WBC to recover from bactrim.   - Fungal prophylaxis: nystatin since he had some thrush.   - Viral serostatus & prophylaxis: CMV+, EBV+, HSV1+, HSV2 neg, VZV+; holding valcyte due to neutropenia.   - Immunization status: to readdress at 6-months following transplant.   - Gamma globulin status: unknown  - Isolation status: Good hand hygiene.    Cari Sagsatume MD   Pager [phone]          Interval History:   Since Everton was last seen by ID on 5/1/2019, he is not feeling better because of fluid gain. Cr daly again from yesterday. IVF were at 50 ml/hr overnight. From the fluid gain he feels bloated, and this has decreased his appetite. From the fluid gain he feels dyspneic, although oxygen sats are 100%. He has frequent productive cough, thick sputum. Cough suppressant and lozenge ordered. Slept poorly overnight. WBC increased with a dose of neupogen.       Transplants:  2/24/2019 (Heart), Postoperative day:  67.  Coordinator Jocelynn Jerez    Review of Systems:  CONSTITUTIONAL:  No fevers, no chills  EYES: negative for icterus or acute vision changes  ENT:  negative for acute hearing loss, tinnitus, sore throat  RESPIRATORY:  dyspnea from fluid gain  CARDIOVASCULAR:  Sinus tachycardia.   GASTROINTESTINAL:  Bloated feeling. Loose stools x 2.   GENITOURINARY:  negative for dysuria or hematuria  HEME:  + easy bruising but no bleeding  INTEGUMENT:  Chest tube sites scabbing as expected.   NEURO:  still has tremor          Current Medications & Allergies:       aspirin  81 mg Oral Daily     azithromycin  500 mg Oral Daily     balsalazide  1,500 mg Oral BID     ceFEPIme (MAXIPIME) IV  1 g Intravenous Q12H     DULoxetine  20 mg Oral Daily     enoxaparin  40 mg Subcutaneous Q24H     fluticasone  1 spray Both Nostrils BID     ipratropium - albuterol 0.5 mg/2.5 mg/3 mL  3 mL Nebulization 4x daily     levothyroxine  100 mcg Oral Daily     magnesium oxide  400 mg Oral BID     melatonin  6 mg Oral At Bedtime     montelukast  10 mg Oral At Bedtime     nystatin  500,000 Units Oral 4x Daily     omeprazole  40 mg Oral QPM     predniSONE  5 mg Oral BID w/meals     rosuvastatin  10 mg Oral Daily     tacrolimus  3 mg Oral QAM     tacrolimus  3.5 mg Oral QPM       Infusions/Drips:    dextrose 5% and 0.45% NaCl 50 mL/hr at 05/01/19 5445     ACE/ARB/ARNI NOT PRESCRIBED       BETA BLOCKER NOT PRESCRIBED          Allergies   Allergen Reactions     Hydromorphone Other (See Comments)     Significant Delirium     Lisinopril Other (See Comments)     hypotension     Codeine Nausea            Physical Exam:   Vitals were reviewed.  All vitals stable.  Patient Vitals for the past 24 hrs:   BP Temp Temp src Pulse Resp SpO2 Weight   05/02/19 0800 (!) 131/97 98.2  F (36.8  C) Oral -- 18 100 % 61.3 kg (135 lb 3.2 oz)   05/02/19 0300 -- -- -- -- -- -- 60.9 kg (134 lb 3.2 oz)   05/01/19 2346 112/78 -- -- -- 18 100 % --   05/01/19 1944 130/88 98.1  F (36.7  C) Oral 105 18 100 % --   05/01/19 1526 114/75 98.7  F (37.1  C) Oral -- 18 98 % --   05/01/19 1300 131/88 98.5  F (36.9  C) Oral -- 18 97 % --     Ranges for vital signs:  Temp:  [98.1  F (36.7  C)-98.7  F (37.1  C)] 98.2  F (36.8  C)  Pulse:  [105] 105  Heart Rate:  [100-110] 102  Resp:  [18] 18  BP: (112-131)/(75-97) 131/97  Cuff Mean (mmHg):  [88] 88  SpO2:  [97 %-100 %] 100 %  Vitals:    04/30/19 0407 05/02/19 0300 05/02/19 0800   Weight: 56.4 kg (124 lb 6.4 oz) 60.9 kg (134 lb 3.2 oz) 61.3 kg (135 lb 3.2 oz)       Physical Examination:  GENERAL:  well-developed, well-nourished man, resting in bed in no acute distress.  HEAD:  Head is normocephalic, atraumatic   EYES:  Eyes have anicteric sclerae without conjunctival injection   ENT:  Oropharynx is moist without ulcers. Tongue is midline. Trace of thrush vs residual nystatin on tongue.   NECK:  Supple.   LUNGS:  Clear to auscultation bilateral.   CARDIOVASCULAR:  Regular rate and rhythm with no murmur. Chest tube sites scabbed as would be expected at this point after transplant.  ABDOMEN:  Normal bowel sounds, soft, minimally tender throughout.   SKIN:  No acute rashes. PIV Line in place without any surrounding erythema or exudate. Midline sternal incision healing as expected.  NEUROLOGIC:  Grossly nonfocal. Active x4 extremities         Laboratory Data:     Inflammatory Markers    Recent Labs   Lab Test 04/29/19  0911  11/15/18  0955 10/22/18  1239   .0* 6.1 12.5       Immune Globulin Studies  No lab results found.      Metabolic Studies       Recent Labs   Lab Test 05/02/19  0536 05/01/19  0515  04/29/19  0918 04/29/19  0911 04/26/19  0848  02/23/19  1835    136   < >  --  131* 138   < > 133   POTASSIUM 5.0 4.0   < >  --  3.5 3.8   < > 3.6   CHLORIDE 106 104   < >  --  94 104   < > 93*   CO2 16* 21   < >  --  24 26   < > 30   ANIONGAP 13 11   < >  --  12 9   < > 10   BUN 26 23   < >  --  23 47*   < > 28   CR 1.84* 1.36*   < >  --  1.04 1.80*   < > 1.03   GFRESTIMATED 40* 58*   < >  --  80 41*   < > 81   * 235*   < >  --  79 134*   < > 114*   A1C  --   --   --   --   --   --   --  5.7*   RADAMES 8.6 8.1*   < >  --  9.0 8.7   < > 8.9   PHOS  --   --   --   --   --  3.6   < > 3.6   MAG 2.0 2.1   < >  --   --  2.2   < > 2.0   LACT  --   --   --  1.7 1.6  --    < >  --    PCAL  --   --   --   --  0.09  --   --   --     < > = values in this interval not displayed.       Hepatic Studies    Recent Labs   Lab Test 04/29/19  0911 04/04/19  0840 04/01/19  0653  03/14/19  1653  03/10/19  0410   BILITOTAL 1.0 0.6 0.6   < >  --    < > 0.8   DBIL  --   --  0.4*   < > 0.4*  --   --    ALKPHOS 125 139 135   < >  --    < > 104   PROTTOTAL 7.1 5.8* 5.6*   < >  --    < > 5.0*   ALBUMIN 3.5 2.9* 2.8*   < >  --    < > 2.8*   AST 10 16 14   < >  --    < > 22   ALT 17 26 28   < >  --    < > 19   LDH  --   --   --   --  320*  --  276*    < > = values in this interval not displayed.       Pancreatitis testing    Recent Labs   Lab Test 04/29/19  0911 03/18/19  0505 02/23/19  1835 11/15/18  0955   AMYLASE  --  51 22*  --    LIPASE 42* 237  --   --    TRIG  --   --   --  112       Gout Labs      Recent Labs   Lab Test 11/15/18  0955   URIC 9.9*       Hematology Studies      Recent Labs   Lab Test 05/02/19  0536 05/01/19  0515 04/30/19  0523 04/29/19  0911 04/26/19  0848 04/24/19  0903   WBC 1.7* 0.6* 1.2* 0.7* 0.7* 1.1*   ANEU 0.6* 0.2* 0.6*  0.2* 0.3* 0.6*   ALYM 0.5* 0.2* 0.4* 0.4* 0.4* 0.4*   GRACE 0.4 0.1 0.1 0.0 0.0 0.0   AEOS 0.1 0.0 0.0 0.0 0.0 0.1   HGB 8.1* 8.0* 9.1* 10.8* 9.4* 9.9*   HCT 26.3* 26.3* 29.4* 35.3* 30.8* 33.2*    167 195 242 169 196       Clotting Studies    Recent Labs   Lab Test 05/02/19  0536 05/01/19  0515 04/30/19  0523 03/27/19  0521  03/14/19  1653   INR 1.27* 1.31* 1.51* 1.39*   < > 1.36*   PTT  --   --   --   --   --  46*    < > = values in this interval not displayed.       Iron Testing    Recent Labs   Lab Test 05/02/19  0536  03/14/19  2116 03/14/19  1653  11/15/18  0955  10/11/18  1235   IRON  --   --   --   --   --   --   --  63   FEB  --   --   --   --   --   --   --  457*   IRONSAT  --   --   --   --   --   --   --  14*   NATE  --   --   --   --   --  1,045*  --   --    MCV 97   < > 94  --    < >  --    < > 87   B12  --   --   --   --   --  1,857*  --   --    HAPT  --   --   --  143  --   --   --   --    RETP  --   --  4.3* 4.1*   < >  --   --   --    RETICABSCT  --   --  100.8* 100.8*   < >  --   --   --     < > = values in this interval not displayed.       Arterial Blood Gas Testing    Recent Labs   Lab Test 03/11/19  1152 03/10/19  1214 03/09/19  0400 03/08/19  2147  02/25/19  1717 02/25/19  1502 02/25/19  0918  02/25/19  0350 02/25/19  0110   PH  --   --   --   --   --  7.32* 7.31* 7.33*  --  7.39 7.35   PCO2  --   --   --   --   --  36 51* 46*  --  42 42   PO2  --   --   --   --   --  110* 75* 125*  --  87 170*   HCO3  --   --   --   --   --  19* 26 24  --  25 23   O2PER 21 21 21 21.0   < > 3L 2  2 40.0  40.0   < > 40.0  40.0 60.0    < > = values in this interval not displayed.        Thyroid Studies     Recent Labs   Lab Test 03/09/19  1148 01/02/19  0906 11/20/18  0337 11/15/18  0955 10/11/18  1235   TSH 1.05 10.41*  --  15.97* 15.44*   T4  --  1.32 1.37  --  1.11       Urine Studies     Recent Labs   Lab Test 04/29/19  1031 03/26/19  1707 03/17/19  0045 03/04/19  1303 02/23/19  1921   URINEPH 6.0  5.5 5.5 5.0 6.0   NITRITE Negative Negative Negative Negative Negative   LEUKEST Negative Negative Negative Negative Negative   WBCU <1 2 15* 0 <1       Medication levels    Recent Labs   Lab Test 04/30/19  0523   TACROL 9.6       Microbiology:  Last Culture results with specimen source  Culture Micro   Date Value Ref Range Status   05/01/2019 No growth after 20 hours  Preliminary   05/01/2019 No growth after 20 hours  Preliminary   04/30/2019 (A)  Final    Canceled, Test credited  >10 Squamous epithelial cells/low power field indicates oral contamination. Please   recollect.     04/30/2019   Final    Notification of test cancellation was given to  ASHLIE SCOTT RN 1553 4.30.19 [facility]     04/30/2019 Culture negative monitoring continues  Preliminary   04/30/2019 Culture negative after 20 hours  Preliminary   04/30/2019 No growth after 20 hours  Preliminary   04/30/2019 Light growth  Pseudomonas aeruginosa   (A)  Preliminary   04/30/2019 Culture in progress  Preliminary   04/30/2019   Preliminary    Culture received and in progress.  Positive AFB results are called as soon as detected.    Final report to follow in 7 to 8 weeks.     04/30/2019   Preliminary    Assayed at [email or web address] [address] [place] [address] [phone]   04/30/2019   Preliminary    Culture received and in progress.  Positive AFB results are called as soon as detected.    Final report to follow in 7 to 8 weeks.     04/30/2019   Preliminary    Assayed at [email or web address] [address] [place] [address] [phone]   04/30/2019 Culture negative after 20 hours  Preliminary   04/30/2019 No growth after 20 hours  Preliminary   04/30/2019 Light growth  Pseudomonas aeruginosa   (A)  Preliminary   04/30/2019 Light growth  Normal respiratory nima    Preliminary   04/30/2019 Culture in progress  Preliminary   04/30/2019 No growth after 2 days  Preliminary    Specimen Description   Date Value Ref Range Status   05/01/2019 Blood Left  Arm  Final   05/01/2019 Blood Right Hand  Final   04/30/2019 Sputum  Final   04/30/2019 Sputum  Final   04/30/2019 Bronchoalveolar Lavage Left upper lobe  Final   04/30/2019 Bronchoalveolar Lavage Left upper lobe  Final   04/30/2019 Bronchoalveolar Lavage Left upper lobe  Final   04/30/2019 Bronchoalveolar Lavage Left upper lobe  Final   04/30/2019 Bronchoalveolar Lavage Left upper lobe  Final   04/30/2019 Bronchoalveolar Lavage Left upper lobe  Final   04/30/2019 Bronchial Left upper lobe  Final   04/30/2019 Bronchial LINGULA  Final   04/30/2019 Bronchial Left upper lobe  Final   04/30/2019 Bronchial LINGULA  Final   04/30/2019 Bronchoalveolar Lavage Lingula  Final   04/30/2019 Bronchoalveolar Lavage Lingula  Final   04/30/2019 Bronchoalveolar Lavage Lingula  Final   04/30/2019 Bronchoalveolar Lavage Lingula  Final   04/30/2019 Blood Right Arm  Final        Last check of C difficile  C Diff Toxin B PCR   Date Value Ref Range Status   04/29/2019 Negative NEG^Negative Final     Comment:     Negative: Clostridium difficile target DNA sequences NOT detected, presumed   negative for Clostridium difficile toxin B or the number of bacteria present   may be below the limit of detection for the test.  FDA approved assay performed using [employer] GeneXpert real-time PCR.  A negative result does not exclude actual disease due to Clostridium difficile   and may be due to improper collection, handling and storage of the specimen   or the number of organisms in the specimen is below the detection limit of the   assay.         Infection Studies to assess Diarrhea   Recent Labs   Lab Test 04/29/19  1511   EPCAMP Not Detected   EPSALM Not Detected   EPSHGL Not Detected   EPVIB Not Detected   EPROTA Not Detected   EPNORO Not Detected   EPYER Not Detected       Syphilis Testing    Treponema Antibodies   Date Value Ref Range Status   02/22/2019 Nonreactive NR^Nonreactive Final       Quantiferon testing   Recent Labs   Lab Test  04/30/19  1111 02/22/19  1057   TBRES  --  Negative   AFBSMS Negative for acid fast bacteria  Assayed at [facility] [address] [place] [address] [phone]  Negative for acid fast bacteria  Assayed at [facility] [address] [place] [address] [phone]  --        Virology:  Respiratory virus testing    Recent Labs   Lab Test 04/29/19  1900   IFLUA Negative   IFLUB Negative   INFZA Negative   INFZB Negative   FLUAH1 Negative   FLUAH3 Negative   [id number] Negative   PIV1 Negative   PIV2 Negative   PIV3 Negative   HMPV Negative   HRVS Negative   RSVA Negative   RSVB Negative   IRSV Negative   RVSPEC Nasopharyngeal       Log IU/mL of CMVQNT   Date Value Ref Range Status   04/30/2019 Not Calculated <2.1 [Log_IU]/mL Final   04/29/2019 Not Calculated <2.1 [Log_IU]/mL Final   04/26/2019 Not Calculated <2.1 [Log_IU]/mL Final   04/24/2019 Not Calculated <2.1 [Log_IU]/mL Final   04/17/2019 Not Calculated <2.1 [Log_IU]/mL Final   04/08/2019 Not Calculated <2.1 [Log_IU]/mL Final   04/01/2019 Not Calculated <2.1 [Log_IU]/mL Final   03/25/2019 Not Calculated <2.1 [Log_IU]/mL Final   03/18/2019 Not Calculated <2.1 [Log_IU]/mL Final       EBV DNA Copies/mL   Date Value Ref Range Status   04/29/2019 EBV DNA Not Detected EBVNEG^EBV DNA Not Detected [Copies]/mL Final   04/26/2019 EBV DNA Not Detected EBVNEG^EBV DNA Not Detected [Copies]/mL Final   03/27/2019 EBV DNA Not Detected EBVNEG^EBV DNA Not Detected [Copies]/mL Final       Imaging:    Recent Results (from the past 48 hour(s))   MR Thoracic Spine w/o Contrast    Narrative    MR THORACIC SPINE W/O CONTRAST 5/1/2019 11:49 AM    Provided History: T-spine fx, pathological; 55yo M with OHT (2/2019)  admitted with pseudomonas bacteremia and T12 fx. Concern for  osteomyelitis    Comparison: CT chest, abdomen, and pelvis 4/29/2019.    Technique: Sagittal T1-weighted, sagittal T2-weighted, sagittal STIR  and axial T2-weighted through the thoracic spine  were obtained without  intravenous contrast.    Findings:  Transitional anatomy with lumbarization of S1. The external marker is  at T7-8. Mild superior endplate compression deformity of the L1  vertebral body with loss of 20-30% vertebral body height. Multiple  Schmorl's nodes in the mid thoracic spine. Normal thoracic vertebral  alignment. No significant loss of intervertebral disc height. Other  than edema related to the L1 compression deformity, normal marrow  signal. No abnormal cord signal. No spinal canal or neural foraminal  stenosis.    Bilateral pulmonary nodules better demonstrated on the recent  comparison body CT examination. Partially visualized mild right  pleural effusion. Susceptibility effect from sternotomy wires.      Impression    Impression:  1. Mild superior endplate compression deformity of the L1 vertebral  body.  2. Normal variant transitional anatomy with lumbarization of S1.  3. Partially visualized right pleural effusion and multiple lung  nodules. See report for recent body CT examination.    ELLEN ROACH MD

## 2019-05-02 NOTE — PHARMACY-VANCOMYCIN DOSING SERVICE
Pharmacy Vancomycin Note  Date of Service May 2, 2019  Patient's  1965   54 year old, male    Indication: neutropenia  Goal Trough Level: 10-15 mg/L  Vancomycin was started on 19 and discontinued on 19. Last dose of vancomycin 1000mg IV was @ 02:45AM on 19.    Creatinine for last 3 days  2019:  5:23 AM Creatinine 1.13 mg/dL  2019:  5:15 AM Creatinine 1.36 mg/dL  2019:  5:36 AM Creatinine 1.84 mg/dL  Current estimated CrCl = Estimated Creatinine Clearance: 38.9 mL/min (A) (based on SCr of 1.84 mg/dL (H)).    Recent Vancomycin Levels (past 3 days)  2019:  3:19 PM Vancomycin Level 10.0 mg/L  (60.5 hr post dose)    Vancomycin IV Administrations (past 72 hours)      No vancomycin orders with administrations in past 72 hours.                Nephrotoxins and other renal medications (From now, onward)    Start     Dose/Rate Route Frequency Ordered Stop    19 1700  vancomycin (VANCOCIN) 750 mg in sodium chloride 0.9 % 250 mL intermittent infusion      750 mg  over 90 Minutes Intravenous ONCE 19 1630      19 1340  vancomycin place burger - receiving intermittent dosing      1 each Intravenous SEE ADMIN INSTRUCTIONS 19 1341      19 1330  bumetanide (BUMEX) injection 2 mg      2 mg Intravenous 2 TIMES DAILY 19 1230      19 0800  tacrolimus (GENERIC EQUIVALENT) capsule 3 mg      3 mg Oral EVERY MORNING. 19 1706      19 1800  tacrolimus (GENERIC EQUIVALENT) capsule 3.5 mg      3.5 mg Oral EVERY EVENING. 19 1706               Contrast Orders - past 72 hours (72h ago, onward)    Start     Dose/Rate Route Frequency Ordered Stop    19  iopamidol (ISOVUE-370) solution 76 mL      76 mL Intravenous ONCE 19        Interpretation of levels and current regimen:  Random level is therapuetic.    Has serum creatinine changed > 50% in last 72 hours: Yes    Urine output:  unable to determine as not all urine  being quantified    Renal Function: Worsening    Plan:  1.  Give vancomycin 750mg IV x one to keep patient within goal vancomycin trough range  2.  Pharmacy will check trough levels as appropriate in 24-48 hours post-dose.    3. Serum creatinine levels will be ordered daily for the first week of therapy and at least twice weekly for subsequent weeks.      Andressa Gómez, Pharm.D., [facility]  Pager [phone]

## 2019-05-03 LAB
ANION GAP SERPL CALCULATED.3IONS-SCNC: 11 MMOL/L (ref 3–14)
ANION GAP SERPL CALCULATED.3IONS-SCNC: 11 MMOL/L (ref 3–14)
ANISOCYTOSIS BLD QL SMEAR: SLIGHT
BACTERIA SPEC CULT: ABNORMAL
BACTERIA SPEC CULT: ABNORMAL
BASOPHILS # BLD AUTO: 0.1 10E9/L (ref 0–0.2)
BASOPHILS NFR BLD AUTO: 3.6 %
BUN SERPL-MCNC: 24 MG/DL (ref 7–30)
BUN SERPL-MCNC: 25 MG/DL (ref 7–30)
CALCIUM SERPL-MCNC: 8.4 MG/DL (ref 8.5–10.1)
CALCIUM SERPL-MCNC: 8.7 MG/DL (ref 8.5–10.1)
CHLORIDE SERPL-SCNC: 103 MMOL/L (ref 94–109)
CHLORIDE SERPL-SCNC: 105 MMOL/L (ref 94–109)
CO2 SERPL-SCNC: 19 MMOL/L (ref 20–32)
CO2 SERPL-SCNC: 20 MMOL/L (ref 20–32)
CREAT SERPL-MCNC: 1.9 MG/DL (ref 0.66–1.25)
CREAT SERPL-MCNC: 1.94 MG/DL (ref 0.66–1.25)
DIFFERENTIAL METHOD BLD: ABNORMAL
DONOR IDENTIFICATION: NORMAL
DSA COMMENTS: NORMAL
DSA PRESENT: NO
DSA TEST METHOD: NORMAL
EOSINOPHIL # BLD AUTO: 0 10E9/L (ref 0–0.7)
EOSINOPHIL NFR BLD AUTO: 0 %
ERYTHROCYTE [DISTWIDTH] IN BLOOD BY AUTOMATED COUNT: 16.7 % (ref 10–15)
GFR SERPL CREATININE-BSD FRML MDRD: 38 ML/MIN/{1.73_M2}
GFR SERPL CREATININE-BSD FRML MDRD: 39 ML/MIN/{1.73_M2}
GLUCOSE SERPL-MCNC: 80 MG/DL (ref 70–99)
GLUCOSE SERPL-MCNC: 95 MG/DL (ref 70–99)
HCT VFR BLD AUTO: 26.4 % (ref 40–53)
HGB BLD-MCNC: 8 G/DL (ref 13.3–17.7)
IGE SERPL-ACNC: 38 KIU/L (ref 0–114)
INR PPP: 1.41 (ref 0.86–1.14)
LYMPHOCYTES # BLD AUTO: 0.9 10E9/L (ref 0.8–5.3)
LYMPHOCYTES NFR BLD AUTO: 20.9 %
MAGNESIUM SERPL-MCNC: 2.1 MG/DL (ref 1.6–2.3)
MAGNESIUM SERPL-MCNC: 2.4 MG/DL (ref 1.6–2.3)
MCH RBC QN AUTO: 29.2 PG (ref 26.5–33)
MCHC RBC AUTO-ENTMCNC: 30.3 G/DL (ref 31.5–36.5)
MCV RBC AUTO: 96 FL (ref 78–100)
MICROCYTES BLD QL SMEAR: PRESENT
MONOCYTES # BLD AUTO: 1.3 10E9/L (ref 0–1.3)
MONOCYTES NFR BLD AUTO: 32.7 %
MYELOCYTES # BLD: 0.1 10E9/L
MYELOCYTES NFR BLD MANUAL: 1.8 %
NEUTROPHILS # BLD AUTO: 1.6 10E9/L (ref 1.6–8.3)
NEUTROPHILS NFR BLD AUTO: 40.1 %
ORGAN: NORMAL
OVALOCYTES BLD QL SMEAR: SLIGHT
PLATELET # BLD AUTO: 220 10E9/L (ref 150–450)
PLATELET # BLD EST: ABNORMAL 10*3/UL
POIKILOCYTOSIS BLD QL SMEAR: SLIGHT
POTASSIUM SERPL-SCNC: 3.8 MMOL/L (ref 3.4–5.3)
POTASSIUM SERPL-SCNC: 4 MMOL/L (ref 3.4–5.3)
PROMYELOCYTES # BLD MANUAL: 0 10E9/L
PROMYELOCYTES NFR BLD MANUAL: 0.9 %
RBC # BLD AUTO: 2.74 10E12/L (ref 4.4–5.9)
SA1 CELL: NORMAL
SA1 COMMENTS: NORMAL
SA1 HI RISK ABY: NORMAL
SA1 MOD RISK ABY: NORMAL
SA1 TEST METHOD: NORMAL
SA2 CELL: NORMAL
SA2 COMMENTS: NORMAL
SA2 HI RISK ABY UA: NORMAL
SA2 MOD RISK ABY: NORMAL
SA2 TEST METHOD: NORMAL
SODIUM SERPL-SCNC: 134 MMOL/L (ref 133–144)
SODIUM SERPL-SCNC: 135 MMOL/L (ref 133–144)
SPECIMEN SOURCE: ABNORMAL
TACROLIMUS BLD-MCNC: 13 UG/L (ref 5–15)
TME LAST DOSE: NORMAL H
UNACCEPTABLE ANTIGEN: NORMAL
UNOS CPRA: 0
WBC # BLD AUTO: 4.1 10E9/L (ref 4–11)

## 2019-05-03 PROCEDURE — 25000125 ZZHC RX 250: Performed by: INTERNAL MEDICINE

## 2019-05-03 PROCEDURE — 25000128 H RX IP 250 OP 636: Performed by: STUDENT IN AN ORGANIZED HEALTH CARE EDUCATION/TRAINING PROGRAM

## 2019-05-03 PROCEDURE — 25000132 ZZH RX MED GY IP 250 OP 250 PS 637: Performed by: INTERNAL MEDICINE

## 2019-05-03 PROCEDURE — 25000128 H RX IP 250 OP 636: Performed by: INTERNAL MEDICINE

## 2019-05-03 PROCEDURE — 40000275 ZZH STATISTIC RCP TIME EA 10 MIN

## 2019-05-03 PROCEDURE — 25000131 ZZH RX MED GY IP 250 OP 636 PS 637: Performed by: INTERNAL MEDICINE

## 2019-05-03 PROCEDURE — 21400000 ZZH R&B CCU UMMC

## 2019-05-03 PROCEDURE — 85025 COMPLETE CBC W/AUTO DIFF WBC: CPT | Performed by: INTERNAL MEDICINE

## 2019-05-03 PROCEDURE — 80048 BASIC METABOLIC PNL TOTAL CA: CPT | Performed by: STUDENT IN AN ORGANIZED HEALTH CARE EDUCATION/TRAINING PROGRAM

## 2019-05-03 PROCEDURE — 83735 ASSAY OF MAGNESIUM: CPT | Performed by: STUDENT IN AN ORGANIZED HEALTH CARE EDUCATION/TRAINING PROGRAM

## 2019-05-03 PROCEDURE — 94640 AIRWAY INHALATION TREATMENT: CPT | Mod: 76

## 2019-05-03 PROCEDURE — 80197 ASSAY OF TACROLIMUS: CPT | Performed by: STUDENT IN AN ORGANIZED HEALTH CARE EDUCATION/TRAINING PROGRAM

## 2019-05-03 PROCEDURE — 36415 COLL VENOUS BLD VENIPUNCTURE: CPT | Performed by: STUDENT IN AN ORGANIZED HEALTH CARE EDUCATION/TRAINING PROGRAM

## 2019-05-03 PROCEDURE — 25000131 ZZH RX MED GY IP 250 OP 636 PS 637: Performed by: STUDENT IN AN ORGANIZED HEALTH CARE EDUCATION/TRAINING PROGRAM

## 2019-05-03 PROCEDURE — 99233 SBSQ HOSP IP/OBS HIGH 50: CPT | Mod: GC | Performed by: INTERNAL MEDICINE

## 2019-05-03 PROCEDURE — 36415 COLL VENOUS BLD VENIPUNCTURE: CPT | Performed by: INTERNAL MEDICINE

## 2019-05-03 PROCEDURE — 94640 AIRWAY INHALATION TREATMENT: CPT

## 2019-05-03 PROCEDURE — 83735 ASSAY OF MAGNESIUM: CPT | Performed by: INTERNAL MEDICINE

## 2019-05-03 PROCEDURE — 85610 PROTHROMBIN TIME: CPT | Performed by: INTERNAL MEDICINE

## 2019-05-03 PROCEDURE — 80048 BASIC METABOLIC PNL TOTAL CA: CPT | Performed by: INTERNAL MEDICINE

## 2019-05-03 RX ORDER — BUMETANIDE 0.25 MG/ML
2 INJECTION INTRAMUSCULAR; INTRAVENOUS
Status: DISCONTINUED | OUTPATIENT
Start: 2019-05-03 | End: 2019-05-04

## 2019-05-03 RX ORDER — BUMETANIDE 0.25 MG/ML
2 INJECTION INTRAMUSCULAR; INTRAVENOUS ONCE
Status: COMPLETED | OUTPATIENT
Start: 2019-05-03 | End: 2019-05-03

## 2019-05-03 RX ORDER — BUMETANIDE 0.25 MG/ML
3 INJECTION INTRAMUSCULAR; INTRAVENOUS 2 TIMES DAILY
Status: DISCONTINUED | OUTPATIENT
Start: 2019-05-03 | End: 2019-05-03

## 2019-05-03 RX ADMIN — POTASSIUM CHLORIDE 20 MEQ: 750 TABLET, EXTENDED RELEASE ORAL at 18:49

## 2019-05-03 RX ADMIN — BALSALAZIDE DISODIUM 1500 MG: 750 CAPSULE ORAL at 08:34

## 2019-05-03 RX ADMIN — NYSTATIN 500000 UNITS: 100000 SUSPENSION ORAL at 15:50

## 2019-05-03 RX ADMIN — BALSALAZIDE DISODIUM 1500 MG: 750 CAPSULE ORAL at 19:39

## 2019-05-03 RX ADMIN — ENOXAPARIN SODIUM 40 MG: 40 INJECTION SUBCUTANEOUS at 13:36

## 2019-05-03 RX ADMIN — IPRATROPIUM BROMIDE AND ALBUTEROL SULFATE 3 ML: .5; 3 SOLUTION RESPIRATORY (INHALATION) at 08:21

## 2019-05-03 RX ADMIN — BUMETANIDE 2 MG: 0.25 INJECTION INTRAMUSCULAR; INTRAVENOUS at 08:35

## 2019-05-03 RX ADMIN — MONTELUKAST SODIUM 10 MG: 10 TABLET, COATED ORAL at 22:04

## 2019-05-03 RX ADMIN — PREDNISONE 5 MG: 5 TABLET ORAL at 17:41

## 2019-05-03 RX ADMIN — FLUTICASONE PROPIONATE 1 SPRAY: 50 SPRAY, METERED NASAL at 08:35

## 2019-05-03 RX ADMIN — TACROLIMUS 2.5 MG: 1 CAPSULE ORAL at 17:41

## 2019-05-03 RX ADMIN — DULOXETINE 20 MG: 20 CAPSULE, DELAYED RELEASE ORAL at 08:34

## 2019-05-03 RX ADMIN — NYSTATIN 500000 UNITS: 100000 SUSPENSION ORAL at 11:45

## 2019-05-03 RX ADMIN — IPRATROPIUM BROMIDE AND ALBUTEROL SULFATE 3 ML: .5; 3 SOLUTION RESPIRATORY (INHALATION) at 15:35

## 2019-05-03 RX ADMIN — ACETAMINOPHEN 650 MG: 325 TABLET, FILM COATED ORAL at 13:36

## 2019-05-03 RX ADMIN — PREDNISONE 5 MG: 5 TABLET ORAL at 08:34

## 2019-05-03 RX ADMIN — MELATONIN TAB 3 MG 6 MG: 3 TAB at 22:04

## 2019-05-03 RX ADMIN — ASPIRIN 81 MG CHEWABLE TABLET 81 MG: 81 TABLET CHEWABLE at 08:33

## 2019-05-03 RX ADMIN — MAGNESIUM OXIDE TAB 400 MG (241.3 MG ELEMENTAL MG) 400 MG: 400 (241.3 MG) TAB at 08:35

## 2019-05-03 RX ADMIN — ACETAMINOPHEN 650 MG: 325 TABLET, FILM COATED ORAL at 08:48

## 2019-05-03 RX ADMIN — FLUTICASONE PROPIONATE 1 SPRAY: 50 SPRAY, METERED NASAL at 19:39

## 2019-05-03 RX ADMIN — ROSUVASTATIN CALCIUM 10 MG: 10 TABLET, FILM COATED ORAL at 08:34

## 2019-05-03 RX ADMIN — OMEPRAZOLE 40 MG: 20 CAPSULE, DELAYED RELEASE ORAL at 19:39

## 2019-05-03 RX ADMIN — IPRATROPIUM BROMIDE AND ALBUTEROL SULFATE 3 ML: .5; 3 SOLUTION RESPIRATORY (INHALATION) at 21:03

## 2019-05-03 RX ADMIN — TACROLIMUS 3 MG: 1 CAPSULE ORAL at 08:33

## 2019-05-03 RX ADMIN — IPRATROPIUM BROMIDE AND ALBUTEROL SULFATE 3 ML: .5; 3 SOLUTION RESPIRATORY (INHALATION) at 11:54

## 2019-05-03 RX ADMIN — ACETAMINOPHEN 650 MG: 325 TABLET, FILM COATED ORAL at 17:43

## 2019-05-03 RX ADMIN — BUMETANIDE 2 MG: 0.25 INJECTION INTRAMUSCULAR; INTRAVENOUS at 13:29

## 2019-05-03 RX ADMIN — CEFTAZIDIME 1 G: 1 INJECTION, POWDER, FOR SOLUTION INTRAMUSCULAR; INTRAVENOUS at 11:45

## 2019-05-03 RX ADMIN — NYSTATIN 500000 UNITS: 100000 SUSPENSION ORAL at 19:39

## 2019-05-03 RX ADMIN — POTASSIUM CHLORIDE 20 MEQ: 750 TABLET, EXTENDED RELEASE ORAL at 08:35

## 2019-05-03 RX ADMIN — LEVOTHYROXINE SODIUM 100 MCG: 25 TABLET ORAL at 08:33

## 2019-05-03 RX ADMIN — MAGNESIUM OXIDE TAB 400 MG (241.3 MG ELEMENTAL MG) 400 MG: 400 (241.3 MG) TAB at 19:39

## 2019-05-03 RX ADMIN — BUMETANIDE 2 MG: 0.25 INJECTION INTRAMUSCULAR; INTRAVENOUS at 15:51

## 2019-05-03 RX ADMIN — NYSTATIN 500000 UNITS: 100000 SUSPENSION ORAL at 08:33

## 2019-05-03 RX ADMIN — ACETAMINOPHEN 650 MG: 325 TABLET, FILM COATED ORAL at 22:04

## 2019-05-03 ASSESSMENT — ACTIVITIES OF DAILY LIVING (ADL)
ADLS_ACUITY_SCORE: 14

## 2019-05-03 ASSESSMENT — PAIN DESCRIPTION - DESCRIPTORS
DESCRIPTORS: DISCOMFORT
DESCRIPTORS: DISCOMFORT

## 2019-05-03 ASSESSMENT — MIFFLIN-ST. JEOR: SCORE: 1413.68

## 2019-05-03 NOTE — PLAN OF CARE
"OT/CR 6C: Cx, attempted x 2 -- pt declined 2/2 feeling fluid overloaded, states he would not be able to tolerate activity and the benefit of ambulation would be \"minimal.\" Pt not receptive to education on importance of activity to promote fluid mobilization and prevent deconditioning. Will reschedule per POC.   "

## 2019-05-03 NOTE — PROGRESS NOTES
[id number]  Neuro: A/Ox4.  Cardiac: ST with HR 100s-110s. AVSS.   Respiratory: O2 sats stable on 1L NC. Pt stable on RA but would like O2 overnight for comfort.   GI/: voiding adequate amounts. No BM this shift.   Diet/appetite: regular diet, little appetite. 2L FR.   Activity:  independent  Pain: back pain controlled with PRN tylenol and hot packs.   Skin: No new deficits noted. Pt bruised throughout. Multiple incisions from heart transplant from February, approximated.   LDA's: PIV SL   Mg replaced this evening. Recheck in the AM. Sputum sample needs to be recollected and reordered. Pt aware, states he is unable to produce any sputum at this time.   Plan: Continue with POC. Notify primary team with changes.

## 2019-05-03 NOTE — PROGRESS NOTES
Cardiology Progress Note  Everton Larios MRN: [id number]  Age: 54 year old, : 1965  05/03/19    I personally saw and examined this patient, discussed with housestaff,reviewed imaging and laboratory studies, confirmed physical examination and discussed results and plan with patient and or family.  Rosendo Beck        Changes Today:     - Continue Ceftazidime   - Discontinue Vancomycin (no longer neutropenic)   - Reassess need for dose of pentamidine next week (held Bactrim due to low WBCs)  - Bumex 4mg IV this AM, 2mg this evening, closely follow urine output   - Tacrolimus level this AM 13.0 (11.5hr trough), decrease [place] from 3/3.5 to 3/2.5   - Will need weekly CMV checked (next due , ordered)  - Will need routine heart biopsy next week (~)  - Check DSA next week (ordered for )          Assessment and Plan:     Everton Larios is a 54 year old male with a history of NICM s/p OHT (2019), paroxysmal atrial fibrillation, recent T12 fracture (2019), ulcerative colitis, Pierce's esophagus, hypothyroidism, GERD, BPH and depression who is admitted with Pseudomonas bacteremia, new T12 fracture and multiple new pulmonary groundglass nodules.      #. Pseudomonas bacteremia  Blood cx from  shows pseudomonas bacteremia. Etiology likely pulmonary (possible pseudomonas pneumonia) with CT Chest on  revealing new diffuse bilateral solid with peripheral groundglass pulmonary nodules/opacities and  mediastinal adenopathy.   - Transplant ID following; appreciate assistance   - Pending cx susceptibilities  - Repeat blood cx today  - Antibiotics:              - Ceftazidime  -               - Cefepime -               - Zosyn -              - One time tobramycin 240mg IV                - Azithromycin -                             - Vancomycin -,      #. Pulmonary groundglass nodules  Notable productive cough of yellow sputum  over the past few days, chest discomfort and sinus congestion noted. CXR with new nodular opacity overlying the right upper lobe. CT chest shows new diffuse bilateral solid with peripheral groundglass pulmonary nodules/opacities and  mediastinal adenopathy. Also concern for possible fungal infection.   - Pulmonology consulted; appreciate assistance   - Bronchoscopy cultures obtained 4/30/2019: growing pseudomonas   - Transplant ID consulted; appreciate assistance  - Aspergillus galactomannan Ag pending (4/30)  - B-D Glucan positive from 4/30, will discuss need for anti-fungal coverage given prolonged QTc (~510)    - Histoplasma U Ag pending  - IgG 106, IgM 29, IgA 106 from 5/1   - Influenza A/B, RSV PCR negative   - Tessalon capsules prn   - Albuterol prn, DuoNebs scheduled      #. Neutropenia   Afebrile and tachycardic to 120s on admission. Lactate wnl.  initially. UA unremarkable. Patient has noted subjective fevers at home in additional to upper respiratory tract symptoms, diffuse abdominal pain, ~5 episodes of non-bloody diarrhea in past 24 hours with stool softener use. Neutropenia likely in the setting of acute infection from bacteremia of respiratory (bacterial, fungal) or GI etiology (EBV, CMV, URI), medication-induced (bactrim, cellcept and valcyte). Hematological or rheumatological etiologies less likely. C.diff negative.   - Neupogen dose 5/1, 5/2  - Neutropenic precautions   - Transplant ID consulted; appreciate recommendations   - Held home cellcept, valcyte and bactrim  - CMV and EBV PCR negative 4/29, next check 3/7, ordered  - Antibiotics: see above     #. Abdominal pain  #. Non-bloody diarrhea   Lactate wnl. Pain predominantly epigastric, RUQ and LLQ on exam. Lipase wnl. Considered CMV colitis, C.diff, EBV, UC flare, typhilitis contributing to diarrhea and abdominal pain. CT A/P shows nonspecific small area of poorly defined heterogeneous posterior right hepatic parenchyma possibly secondary  to the hepatic fibrosis.   - C. Diff and enteric panel negative   - CMV and EBV PCR 4/29 negative     #. NICM s/p OHT (2/24/2019) with RV dysfunction  Most recent biopsy 4/24/2019 with 1 R mild focal acute cellular rejection with no AMR. Bactrim and Valcyte held since 4/26/2019 due to leukopenia. Serostatus: CMV+, EBV+, HSV1+, HSV2 neg, VZV+.   - Immunosuppresives:                - Held home cellcept               - Continue tacrolimus 3mg qAM and 2.5mg qPM; goal of 8 - 10               - Continue prednisone 5mg BID   - PPx: held valcyte and bactrim in setting of neutropenia. Continue nystatin.   - Diuretics: bumex 2mg IV BID, additional dose of 2mg IV this AM  - ASA 81mg daily  - Rosuvastatin 10mg daily   - Tacrolimus level 4/29 was 9.6, next check 5/3  - Weekly CMV ordered  - Repeat heart biopsy on 5/8/2019  - Check DSA next week (ordered for 5/8)      #. Ulcerative colitis: continue home balsalazide 1500mg BID  #. Hypothyroidism: continue levothyroxine   #. GERD and history of Pierce's esophagus: continue omeprazole   #. Depression: continue duloxetine   #. Seasonal allergies: azelastine 0.1% nasal spray, fluticasone, montelukast   #. Insomnia: continue melatonin     Diet/IVF: cardiac diet, NO IVF  DVT ppx: Lovenox subcutaneous daily ([place] 6)   Disposition/Admission Status: admitted for further pseudomonas bacteremia   CODE: Full Code     Patient was discussed with staff attending, Dr. Hatch.     Giuseppe Fritz MD  PGY-2 Internal Medicine  Cardiology Service             Subjective     Nursing notes reviewed.           Objective     Vitals:  Temp:  [98  F (36.7  C)-98.4  F (36.9  C)] 98  F (36.7  C)  Pulse:  [110] 110  Heart Rate:  [102-109] 104  Resp:  [18] 18  BP: (126-136)/(91-97) 126/91  Cuff Mean (mmHg):  [103] 103  SpO2:  [98 %-100 %] 98 %  MAP: 100s    I/O: Net even over past 24hrs    Vitals:    04/30/19 0407 05/02/19 0300 05/02/19 0800   Weight: 56.4 kg (124 lb 6.4 oz) 60.9 kg (134 lb 3.2 oz)  61.3 kg (135 lb 3.2 oz)       Gen: NAD, pleasant  HEENT: No scleral icterus, Moist mucous membranes.   CV:  RRR, S1 S2 nl, no murmurs, rubs or gallops   Resp: expiratory wheezing, no crackles   Abdomen: Soft, diffuse abdominal pain, normal active bowel sounds  Extremities: No peripheral edema, warm  Skin: sternotomy scar across torso   Neuro: asleep, grossly non-focal              Data:      Labs reviewed.          Medications     Medications    aspirin  81 mg Oral Daily     balsalazide  1,500 mg Oral BID     bumetanide  2 mg Intravenous BID     cefTAZidime  1 g Intravenous Q12H     DULoxetine  20 mg Oral Daily     enoxaparin  40 mg Subcutaneous Q24H     fluticasone  1 spray Both Nostrils BID     ipratropium - albuterol 0.5 mg/2.5 mg/3 mL  3 mL Nebulization 4x daily     levothyroxine  100 mcg Oral Daily     magnesium oxide  400 mg Oral BID     melatonin  6 mg Oral At Bedtime     montelukast  10 mg Oral At Bedtime     nystatin  500,000 Units Oral 4x Daily     omeprazole  40 mg Oral QPM     predniSONE  5 mg Oral BID w/meals     rosuvastatin  10 mg Oral Daily     tacrolimus  3 mg Oral QAM     tacrolimus  3.5 mg Oral QPM     vancomycin place burger - receiving intermittent dosing  1 each Intravenous See Admin Instructions       ACE/ARB/ARNI NOT PRESCRIBED       BETA BLOCKER NOT PRESCRIBED

## 2019-05-03 NOTE — PROGRESS NOTES
[facility]  Transplant Infectious Disease Progress Note      Patient:  Everton Larios, Date of birth 1965, Medical record number [id number]  Date of Visit:  05/03/2019         Assessment and Recommendations:   Recommendations:  - Discontinue vancomycin (done for you).   - Continue ceftazidime for coverage of Pseudomonas in 4/30/2019 bronch cx & 4/29/2019 BCx, reassess duration at 5/14/2019.   - Continue nystatin for yeast prophylaxis.   - Ok to hold off on Pneumocystis prophylaxis for now, while allowing his WBC to recover from bactrim. Reassess need for a dose of pentamidine next week.   - Check CMV PCR weekly starting 5/6/2019, since valcyte is on hold due to neutropenia.     Transplant Infectious Disease will continue to follow with you. Coverage this weekend is Dr. Brittany Gurrola, pager [phone]. Coverage next week is Dr. Ric Shukla, pager [phone].    Assessment:  Everton Larios is a 55 yo gentleman who underwent orthotopic heart transplant 2/24/19.  Infectious Disease issues include:  - 4/29/2019 Pseudomonas aeruginosa sepsis. Likely is the cause of his fever. He is responding clinically to antibiotics started 4/29/2019, and one dose of tobramycin 4/30/2019. Continue ceftazidime for coverage of Pseudomonas in 4/30/2019 bronch cx & 4/29/2019 BCx. We are using single drug rx since both quinolones and aminoglycosides are contraindicated.   - 4/30/3019 CT with new scattered bilateral peribronchial solid opacities with peripheral groundglass appearance, largest measures 1.5 cm. He also has mediastinal adenopathy. Constellation of findings are highly suspicious for infection (typical or atypical infection). DDx includes septic emboli from Pseudomonas, fungal infection, atypical mycobacterial infection. Negative Aspergillus galactomannan antigen. Awaiting fungal antibodies, Histoplasma U Ag, 4/30/2019 BAL results.   - New compression deformity of T12 vertebral body, since 3/19/2019.  Quantiferon negative prior to transplant. 5/1/2019 MRI of T12 area shows no osteomyelitis.  - Hx of Klebsiella oxytoca from sputum specimen 3/20/2019. He has retrocardiac opacity on 3/22/2019 pCXR, and nodular groundglass opacities in the right lung on 3/19/2019. He had a good clinical symptom response to zosyn.  - QTc interval prolonged: 586 msec on 3/11/2019 EKG. 512 msec on 4/30/2019 EKG. Have to avoid quinolones for now.   - Pneumocystis prophylaxis: pentamidine given 3/3/2019, then on Bactrim until 4/26/2019 when he had neutropenia. Ok to hold off on Pneumocystis prophylaxis for now, while allowing his WBC to recover from bactrim.   - Fungal prophylaxis: nystatin since he had some thrush. Fungitell mildly +, may be the result of yeast colonization.   - Viral serostatus & prophylaxis: CMV+, EBV+, HSV1+, HSV2 neg, VZV+; holding valcyte due to neutropenia.   - Immunization status: to readdress at 6-months following transplant.   - Gamma globulin status: moderate hypogammaglobulinemia.   - Isolation status: Good hand hygiene.    Cari Sagastume MD   Pager [phone]         Interval History:   Since Everton was last seen by ID on 5/2/2019, he is not feeling better because he is still carrying a lot of fluid. Cr daly and then peaked, so he may be over the most acute aspect of his kidney injury. WBC better. His parents are visiting, and he is talking quite a bit. He is able to manage IV antibiotics at home. Tylenol for pain. Sinus tachycardia. Slept most of the night. Bumex restarted yesterday. He still feels full with water in his abdomen, but he can tell there's a difference. Neutropenic precautions were removed yesterday.    Transplants:  2/24/2019 (Heart), Postoperative day:  68.  Coordinator Jocelynn Jerez    Review of Systems:  CONSTITUTIONAL:  No fevers, no chills  EYES: negative for icterus or acute vision changes  ENT:  negative for acute hearing loss, tinnitus, sore throat  RESPIRATORY:  dyspnea from fluid  gain  CARDIOVASCULAR:  Sinus tachycardia.   GASTROINTESTINAL:  Bloated feeling. Loose stools x 2.   GENITOURINARY:  negative for dysuria or hematuria  HEME:  + easy bruising but no bleeding  INTEGUMENT:  Chest tube sites scabbing as expected.   NEURO:  still has tremor          Current Medications & Allergies:       aspirin  81 mg Oral Daily     balsalazide  1,500 mg Oral BID     bumetanide  2 mg Intravenous BID     cefTAZidime  1 g Intravenous Q12H     DULoxetine  20 mg Oral Daily     enoxaparin  40 mg Subcutaneous Q24H     fluticasone  1 spray Both Nostrils BID     ipratropium - albuterol 0.5 mg/2.5 mg/3 mL  3 mL Nebulization 4x daily     levothyroxine  100 mcg Oral Daily     magnesium oxide  400 mg Oral BID     melatonin  6 mg Oral At Bedtime     montelukast  10 mg Oral At Bedtime     nystatin  500,000 Units Oral 4x Daily     omeprazole  40 mg Oral QPM     predniSONE  5 mg Oral BID w/meals     rosuvastatin  10 mg Oral Daily     tacrolimus  3 mg Oral QAM     tacrolimus  3.5 mg Oral QPM     vancomycin place burger - receiving intermittent dosing  1 each Intravenous See Admin Instructions       Infusions/Drips:    ACE/ARB/ARNI NOT PRESCRIBED       BETA BLOCKER NOT PRESCRIBED         Allergies   Allergen Reactions     Hydromorphone Other (See Comments)     Significant Delirium     Lisinopril Other (See Comments)     hypotension     Quinolones Other (See Comments)     Would not rx quinolones until QTc interval improved.      Tobramycin Other (See Comments)     Would not use aminoglycosides as his kidney function is very borderline     Codeine Nausea            Physical Exam:   Vitals were reviewed.  All vitals stable.  Patient Vitals for the past 24 hrs:   BP Temp Temp src Pulse Resp SpO2 Weight   05/03/19 1212 130/90 97.9  F (36.6  C) Oral -- -- 98 % --   05/03/19 0739 (!) 133/93 97.9  F (36.6  C) Oral 99 18 97 % 60.9 kg (134 lb 4.8 oz)   05/02/19 2344 (!) 126/91 98  F (36.7  C) Oral -- 18 98 % --   05/02/19 1930 (!)  133/93 98.4  F (36.9  C) Oral 110 18 100 % --   05/02/19 1500 (!) 133/93 98.3  F (36.8  C) Oral -- 18 100 % --     Ranges for vital signs:  Temp:  [97.9  F (36.6  C)-98.4  F (36.9  C)] 97.9  F (36.6  C)  Pulse:  [] 99  Heart Rate:  [103-111] 111  Resp:  [18] 18  BP: (126-133)/(90-93) 130/90  Cuff Mean (mmHg):  [103] 103  SpO2:  [97 %-100 %] 98 %  Vitals:    05/02/19 0300 05/02/19 0800 05/03/19 0739   Weight: 60.9 kg (134 lb 3.2 oz) 61.3 kg (135 lb 3.2 oz) 60.9 kg (134 lb 4.8 oz)       Physical Examination:  GENERAL:  well-developed, well-nourished man, resting in bed in no acute distress.  HEAD:  Head is normocephalic, atraumatic   EYES:  Eyes have anicteric sclerae without conjunctival injection   ENT:  Oropharynx is moist without ulcers. Tongue is midline. Trace of thrush vs residual nystatin on tongue.   NECK:  Supple.   LUNGS:  Clear to auscultation bilateral.   CARDIOVASCULAR:  Tachycardic rate and rhythm with no murmur. Chest tube sites scabbed as would be expected at this point after transplant.  ABDOMEN:  Normal bowel sounds, soft, minimally tender throughout.   SKIN:  No acute rashes. PIV Line in place without any surrounding erythema or exudate. Midline sternal incision healing as expected.  NEUROLOGIC:  Grossly nonfocal. Active x4 extremities         Laboratory Data:     Inflammatory Markers    Recent Labs   Lab Test 05/02/19  0536 04/29/19  0911 11/15/18  0955 10/22/18  1239   SED 37*  --   --   --    CRP 69.0* 120.0* 6.1 12.5       Immune Globulin Studies    Recent Labs   Lab Test 05/01/19  1850   *   IGM 29*          Metabolic Studies       Recent Labs   Lab Test 05/03/19  0435 05/02/19  1838  04/30/19  1758  04/29/19  0918 04/29/19  0911 04/26/19  0848  02/23/19  1835    137   < >  --    < >  --  131* 138   < > 133   POTASSIUM 4.0 4.5   < >  --    < >  --  3.5 3.8   < > 3.6   CHLORIDE 105 107   < >  --    < >  --  94 104   < > 93*   CO2 19* 20   < >  --    < >  --  24 26   <  > 30   ANIONGAP 11 10   < >  --    < >  --  12 9   < > 10   BUN 25 25   < >  --    < >  --  23 47*   < > 28   CR 1.94* 1.99*   < >  --    < >  --  1.04 1.80*   < > 1.03   GFRESTIMATED 38* 36*   < >  --    < >  --  80 41*   < > 81   GLC 80 77   < >  --    < >  --  79 134*   < > 114*   A1C  --   --   --   --   --   --   --   --   --  5.7*   RADAMES 8.4* 8.7   < >  --    < >  --  9.0 8.7   < > 8.9   PHOS  --   --   --   --   --   --   --  3.6   < > 3.6   MAG 2.4* 1.9   < >  --    < >  --   --  2.2   < > 2.0   LACT  --   --   --   --   --  1.7 1.6  --    < >  --    PCAL  --   --   --   --   --   --  0.09  --   --   --    FGTL  --   --   --  109  --   --   --   --   --   --     < > = values in this interval not displayed.       Hepatic Studies    Recent Labs   Lab Test 04/29/19  0911 04/04/19  0840 04/01/19  0653  03/14/19  1653  03/10/19  0410   BILITOTAL 1.0 0.6 0.6   < >  --    < > 0.8   DBIL  --   --  0.4*   < > 0.4*  --   --    ALKPHOS 125 139 135   < >  --    < > 104   PROTTOTAL 7.1 5.8* 5.6*   < >  --    < > 5.0*   ALBUMIN 3.5 2.9* 2.8*   < >  --    < > 2.8*   AST 10 16 14   < >  --    < > 22   ALT 17 26 28   < >  --    < > 19   LDH  --   --   --   --  320*  --  276*    < > = values in this interval not displayed.       Pancreatitis testing    Recent Labs   Lab Test 04/29/19  0911 03/18/19  0505 02/23/19  1835 11/15/18  0955   AMYLASE  --  51 22*  --    LIPASE 42* 237  --   --    TRIG  --   --   --  112       Gout Labs      Recent Labs   Lab Test 11/15/18  0955   URIC 9.9*       Hematology Studies      Recent Labs   Lab Test 05/03/19  0435 05/02/19  0536 05/01/19  0515 04/30/19  0523 04/29/19  0911 04/26/19  0848   WBC 4.1 1.7* 0.6* 1.2* 0.7* 0.7*   ANEU 1.6 0.6* 0.2* 0.6* 0.2* 0.3*   ALYM 0.9 0.5* 0.2* 0.4* 0.4* 0.4*   GRACE 1.3 0.4 0.1 0.1 0.0 0.0   AEOS 0.0 0.1 0.0 0.0 0.0 0.0   HGB 8.0* 8.1* 8.0* 9.1* 10.8* 9.4*   HCT 26.4* 26.3* 26.3* 29.4* 35.3* 30.8*    189 167 195 242 169       Clotting Studies    Recent  Labs   Lab Test 05/03/19 0435 05/02/19  0536 05/01/19  0515 04/30/19  0523  03/14/19  1653   INR 1.41* 1.27* 1.31* 1.51*   < > 1.36*   PTT  --   --   --   --   --  46*    < > = values in this interval not displayed.       Iron Testing    Recent Labs   Lab Test 05/03/19 0435 03/14/19  2116 03/14/19  1653  11/15/18  0955  10/11/18  1235   IRON  --   --   --   --   --   --   --  63   FEB  --   --   --   --   --   --   --  457*   IRONSAT  --   --   --   --   --   --   --  14*   NATE  --   --   --   --   --  1,045*  --   --    MCV 96   < > 94  --    < >  --    < > 87   B12  --   --   --   --   --  1,857*  --   --    HAPT  --   --   --  143  --   --   --   --    RETP  --   --  4.3* 4.1*   < >  --   --   --    RETICABSCT  --   --  100.8* 100.8*   < >  --   --   --     < > = values in this interval not displayed.       Arterial Blood Gas Testing    Recent Labs   Lab Test 03/11/19  1152 03/10/19  1214 03/09/19  0400 03/08/19  2147  02/25/19  1717 02/25/19  1502 02/25/19  0918  02/25/19  0350 02/25/19  0110   PH  --   --   --   --   --  7.32* 7.31* 7.33*  --  7.39 7.35   PCO2  --   --   --   --   --  36 51* 46*  --  42 42   PO2  --   --   --   --   --  110* 75* 125*  --  87 170*   HCO3  --   --   --   --   --  19* 26 24  --  25 23   O2PER 21 21 21 21.0   < > 3L 2  2 40.0  40.0   < > 40.0  40.0 60.0    < > = values in this interval not displayed.        Thyroid Studies     Recent Labs   Lab Test 03/09/19  1148 01/02/19  0906 11/20/18  0337 11/15/18  0955 10/11/18  1235   TSH 1.05 10.41*  --  15.97* 15.44*   T4  --  1.32 1.37  --  1.11       Urine Studies     Recent Labs   Lab Test 04/29/19  1031 03/26/19  1707 03/17/19  0045 03/04/19  1303 02/23/19  1921   URINEPH 6.0 5.5 5.5 5.0 6.0   NITRITE Negative Negative Negative Negative Negative   LEUKEST Negative Negative Negative Negative Negative   WBCU <1 2 15* 0 <1       Medication levels    Recent Labs   Lab Test 05/03/19  0454 05/02/19  1519   VANCOMYCIN  --  10.0   TACROL  13.0  --        Microbiology:  Beta D Glucan levels (Fungitell assay)    Recent Labs   Lab Test 04/30/19  1758   FGTL 109       Last Culture results with specimen source  Culture Micro   Date Value Ref Range Status   05/02/2019 No growth after 14 hours  Preliminary   05/02/2019 No growth after 14 hours  Preliminary   05/02/2019 (A)  Final    Canceled, Test credited  >10 Squamous epithelial cells/low power field indicates oral contamination. Please   recollect.     05/01/2019 No growth after 2 days  Preliminary   05/01/2019 No growth after 2 days  Preliminary   04/30/2019 (A)  Final    Canceled, Test credited  >10 Squamous epithelial cells/low power field indicates oral contamination. Please   recollect.     04/30/2019   Final    Notification of test cancellation was given to  ASHLIE SCOTT RN 1553 4.30.19 ND     04/30/2019 Culture negative monitoring continues  Preliminary   04/30/2019 Culture negative after 3 days  Preliminary   04/30/2019 No growth after 3 days  Preliminary   04/30/2019 Light growth  Pseudomonas aeruginosa   (A)  Final   04/30/2019 Light growth  Normal respiratory nima    Final   04/30/2019   Preliminary    Culture received and in progress.  Positive AFB results are called as soon as detected.    Final report to follow in 7 to 8 weeks.     04/30/2019   Preliminary    Assayed at [facility] [address] [place] [id number] [phone]   04/30/2019   Preliminary    Culture received and in progress.  Positive AFB results are called as soon as detected.    Final report to follow in 7 to 8 weeks.     04/30/2019   Preliminary    Assayed at [facility] [address] [place] [address] [phone]   04/30/2019 Culture negative after 3 days  Preliminary   04/30/2019 No growth after 3 days  Preliminary   04/30/2019 (A)  Final    Light growth  Pseudomonas aeruginosa  Susceptibility testing done on previous specimen     04/30/2019 Light growth  Normal respiratory nima    Final     Specimen Description   Date Value Ref Range Status   05/02/2019 Blood Right Hand  Final   05/02/2019 Blood Right Hand  Final   05/02/2019 Sputum  Final   05/02/2019 Sputum  Final   05/01/2019 Blood Left Arm  Final   05/01/2019 Blood Right Hand  Final   04/30/2019 Sputum  Final   04/30/2019 Sputum  Final   04/30/2019 Bronchoalveolar Lavage Left upper lobe  Final   04/30/2019 Bronchoalveolar Lavage Left upper lobe  Final   04/30/2019 Bronchoalveolar Lavage Left upper lobe  Final   04/30/2019 Bronchoalveolar Lavage Left upper lobe  Final   04/30/2019 Bronchoalveolar Lavage Left upper lobe  Final   04/30/2019 Bronchoalveolar Lavage Left upper lobe  Final   04/30/2019 Bronchial Left upper lobe  Final   04/30/2019 Bronchial LINGULA  Final   04/30/2019 Bronchial Left upper lobe  Final   04/30/2019 Bronchial LINGULA  Final   04/30/2019 Bronchoalveolar Lavage Lingula  Final   04/30/2019 Bronchoalveolar Lavage Lingula  Final   04/30/2019 Bronchoalveolar Lavage Lingula  Final   04/30/2019 Bronchoalveolar Lavage Lingula  Final        Last check of C difficile  C Diff Toxin B PCR   Date Value Ref Range Status   04/29/2019 Negative NEG^Negative Final     Comment:     Negative: Clostridium difficile target DNA sequences NOT detected, presumed   negative for Clostridium difficile toxin B or the number of bacteria present   may be below the limit of detection for the test.  FDA approved assay performed using [employer] GeneXpert real-time PCR.  A negative result does not exclude actual disease due to Clostridium difficile   and may be due to improper collection, handling and storage of the specimen   or the number of organisms in the specimen is below the detection limit of the   assay.         Infection Studies to assess Diarrhea   Recent Labs   Lab Test 04/29/19  1511   EPCAMP Not Detected   EPSALM Not Detected   EPSHGL Not Detected   EPVIB Not Detected   EPROTA Not Detected   EPNORO Not Detected   EPYER Not Detected       Syphilis  Testing    Treponema Antibodies   Date Value Ref Range Status   02/22/2019 Nonreactive NR^Nonreactive Final       Quantiferon testing   Recent Labs   Lab Test 04/30/19  1111 02/22/19  1057   TBRES  --  Negative   AFBSMS Negative for acid fast bacteria  Assayed at [facility] [address] [place] [address] [phone]  Negative for acid fast bacteria  Assayed at [facility] [address] [place] [address] [phone]  --        Virology:  Respiratory virus testing    Recent Labs   Lab Test 04/29/19  1900   IFLUA Negative   IFLUB Negative   INFZA Negative   INFZB Negative   FLUAH1 Negative   FLUAH3 Negative   OW6293 Negative   PIV1 Negative   PIV2 Negative   PIV3 Negative   HMPV Negative   HRVS Negative   RSVA Negative   RSVB Negative   IRSV Negative   RVSPEC Nasopharyngeal       Log IU/mL of CMVQNT   Date Value Ref Range Status   04/30/2019 Not Calculated <2.1 [Log_IU]/mL Final   04/29/2019 Not Calculated <2.1 [Log_IU]/mL Final   04/26/2019 Not Calculated <2.1 [Log_IU]/mL Final   04/24/2019 Not Calculated <2.1 [Log_IU]/mL Final   04/17/2019 Not Calculated <2.1 [Log_IU]/mL Final   04/08/2019 Not Calculated <2.1 [Log_IU]/mL Final   04/01/2019 Not Calculated <2.1 [Log_IU]/mL Final   03/25/2019 Not Calculated <2.1 [Log_IU]/mL Final   03/18/2019 Not Calculated <2.1 [Log_IU]/mL Final       EBV DNA Copies/mL   Date Value Ref Range Status   04/29/2019 EBV DNA Not Detected EBVNEG^EBV DNA Not Detected [Copies]/mL Final   04/26/2019 EBV DNA Not Detected EBVNEG^EBV DNA Not Detected [Copies]/mL Final   03/27/2019 EBV DNA Not Detected EBVNEG^EBV DNA Not Detected [Copies]/mL Final       Imaging:  No results found for this or any previous visit (from the past 48 hour(s)).

## 2019-05-03 NOTE — PROGRESS NOTES
Care Coordinator Progress Note    Admission Date/Time:  4/29/2019  Attending MD:  Holden Hatch MD    Data  Chart reviewed, discussed with interdisciplinary team.   Patient was admitted for:    SIRS (systemic inflammatory response syndrome) (H)  Heart replaced by transplant (H).    Assessment  Concerns with insurance coverage for discharge needs: None.  Current Living Situation: Patient states he is still temporarily living at his parents while he recovers post transplant. His parent's address is [address] [place] [address].   Support System: Supportive and Involved  Services Involved: [facility] ([facility])  Transportation at Discharge: Family or friend will provide  Transportation to Medical Appointments: Parents  Barriers to Discharge: Medical plan of care    Coordination of Care and Referrals: Provided patient/family with options for home vs outpatient infusion. Pt has done home infusion in the past for home milrinone through [facility] and would like to use them again if it is covered by insurance. This writer called in benefit check to [facility] to check for insurance coverage.      Plan  Anticipated Discharge Date: TBD  Anticipated Discharge Plan: TBD  CC will continue to monitor patient's medical condition and progress towards discharge.  Anna Landis RN BSN  6C Unit Care Coordinator  Phone number: [phone]  Pager: [phone]

## 2019-05-03 NOTE — PROGRESS NOTES
Transplant Social Work Services Progress Note      Date of Initial Social Work Evaluation: 11/16/2018, 5/2/19  Collaborated with: Pt and pt's parents, at bedside     Data: Transplant  continuing to follow. Supportive visit to check in on pt's parents, whom are providing caregiver support.     Intervention: Supportive Visit     Assessment: Pt's parents are receptive to SW check-in; pt's mother, Verito, did most of the talking as pt's father sat there. Pt's mother reports that things at home have been going well. Pt has been able to be independent and not requiring much assistance from them. Pt has been managing his own medications and able to be independent with ADLs.  Both pt and parents acknowledge there has certainly been an adjustment period with pt moving back in, but now adjusting well with this.  Discussed returning to parents home after hospital discharge and potential for IV abx. Pt states he would feel comfortable managing home IV abx.   Education provided by SW: Ongoing SW support   Plan:    Discharge Plans in Progress: Anticipate pt will be able to return home.     Barriers to d/c plan: Medical Stability     Follow up Plan: SW to continue to follow.

## 2019-05-03 NOTE — PLAN OF CARE
D: Patient admitted 4/29 for pseudomonas bacteremia and recent T12 fracture (fall at home 4/21). History of NICM s/p heart transplant 2/24/19, paroxysmal a fib, ulcerative colitis, hypothyroid, GERD, BPH and depression.    I: Monitored vitals and assessed patient status.   Changed: Bumex 2mg BID; additional 2mg Bumex IV; vanco dc'd  PRN: 20 meq k+; tylenol    A: VSS. A0x4. 1L NC. Endorses abdominal fullness. Afebrile. Urinating adequately. Up independently.    P: Anticipate further diuresis. Patient will discharge on IV antibiotics. Continue assess and monitor, notify Cards 2 team with concerns.

## 2019-05-03 NOTE — PLAN OF CARE
D: Bacteremia. Neutropenic.      I/A: VSS. PRN Tylenol for pain. Sinus tach. Slept most of shift. Patient feeling a little better after bumex restarted yesterday. Patient states he still feels full with water in his abd but he can tell there's a difference. WBC is improved this morning and Neutropenic precautions were removed yesterday.     P: Continue to monitor.

## 2019-05-04 ENCOUNTER — APPOINTMENT (OUTPATIENT)
Dept: OCCUPATIONAL THERAPY | Facility: CLINIC | Age: 54
DRG: 853 | End: 2019-05-04
Payer: COMMERCIAL

## 2019-05-04 LAB
ANION GAP SERPL CALCULATED.3IONS-SCNC: 10 MMOL/L (ref 3–14)
ANION GAP SERPL CALCULATED.3IONS-SCNC: 12 MMOL/L (ref 3–14)
ANISOCYTOSIS BLD QL SMEAR: SLIGHT
BASOPHILS # BLD AUTO: 0 10E9/L (ref 0–0.2)
BASOPHILS NFR BLD AUTO: 0 %
BUN SERPL-MCNC: 22 MG/DL (ref 7–30)
BUN SERPL-MCNC: 22 MG/DL (ref 7–30)
CALCIUM SERPL-MCNC: 8.4 MG/DL (ref 8.5–10.1)
CALCIUM SERPL-MCNC: 8.9 MG/DL (ref 8.5–10.1)
CHLORIDE SERPL-SCNC: 104 MMOL/L (ref 94–109)
CHLORIDE SERPL-SCNC: 106 MMOL/L (ref 94–109)
CO2 SERPL-SCNC: 20 MMOL/L (ref 20–32)
CO2 SERPL-SCNC: 22 MMOL/L (ref 20–32)
CREAT SERPL-MCNC: 1.73 MG/DL (ref 0.66–1.25)
CREAT SERPL-MCNC: 1.81 MG/DL (ref 0.66–1.25)
DIFFERENTIAL METHOD BLD: ABNORMAL
EOSINOPHIL # BLD AUTO: 0 10E9/L (ref 0–0.7)
EOSINOPHIL NFR BLD AUTO: 0 %
ERYTHROCYTE [DISTWIDTH] IN BLOOD BY AUTOMATED COUNT: 16.7 % (ref 10–15)
GFR SERPL CREATININE-BSD FRML MDRD: 41 ML/MIN/{1.73_M2}
GFR SERPL CREATININE-BSD FRML MDRD: 43 ML/MIN/{1.73_M2}
GLUCOSE SERPL-MCNC: 116 MG/DL (ref 70–99)
GLUCOSE SERPL-MCNC: 94 MG/DL (ref 70–99)
HCT VFR BLD AUTO: 28 % (ref 40–53)
HGB BLD-MCNC: 8.5 G/DL (ref 13.3–17.7)
INR PPP: 1.37 (ref 0.86–1.14)
LYMPHOCYTES # BLD AUTO: 0.6 10E9/L (ref 0.8–5.3)
LYMPHOCYTES NFR BLD AUTO: 9.6 %
MAGNESIUM SERPL-MCNC: 1.6 MG/DL (ref 1.6–2.3)
MAGNESIUM SERPL-MCNC: 2.1 MG/DL (ref 1.6–2.3)
MCH RBC QN AUTO: 29.7 PG (ref 26.5–33)
MCHC RBC AUTO-ENTMCNC: 30.4 G/DL (ref 31.5–36.5)
MCV RBC AUTO: 98 FL (ref 78–100)
METAMYELOCYTES # BLD: 0.3 10E9/L
METAMYELOCYTES NFR BLD MANUAL: 5.3 %
MONOCYTES # BLD AUTO: 1.2 10E9/L (ref 0–1.3)
MONOCYTES NFR BLD AUTO: 19.3 %
MYELOCYTES # BLD: 0.1 10E9/L
MYELOCYTES NFR BLD MANUAL: 1.8 %
NEUTROPHILS # BLD AUTO: 3.9 10E9/L (ref 1.6–8.3)
NEUTROPHILS NFR BLD AUTO: 64 %
OVALOCYTES BLD QL SMEAR: SLIGHT
PLATELET # BLD AUTO: 240 10E9/L (ref 150–450)
PLATELET # BLD EST: ABNORMAL 10*3/UL
POIKILOCYTOSIS BLD QL SMEAR: SLIGHT
POTASSIUM SERPL-SCNC: 3.8 MMOL/L (ref 3.4–5.3)
POTASSIUM SERPL-SCNC: 4.1 MMOL/L (ref 3.4–5.3)
RBC # BLD AUTO: 2.86 10E12/L (ref 4.4–5.9)
SODIUM SERPL-SCNC: 135 MMOL/L (ref 133–144)
SODIUM SERPL-SCNC: 138 MMOL/L (ref 133–144)
URATE SERPL-MCNC: 7.5 MG/DL (ref 3.5–7.2)
WBC # BLD AUTO: 6.1 10E9/L (ref 4–11)

## 2019-05-04 PROCEDURE — 94640 AIRWAY INHALATION TREATMENT: CPT | Mod: 76

## 2019-05-04 PROCEDURE — 25000132 ZZH RX MED GY IP 250 OP 250 PS 637: Performed by: STUDENT IN AN ORGANIZED HEALTH CARE EDUCATION/TRAINING PROGRAM

## 2019-05-04 PROCEDURE — 40000275 ZZH STATISTIC RCP TIME EA 10 MIN

## 2019-05-04 PROCEDURE — 97535 SELF CARE MNGMENT TRAINING: CPT | Mod: GO | Performed by: OCCUPATIONAL THERAPIST

## 2019-05-04 PROCEDURE — 85025 COMPLETE CBC W/AUTO DIFF WBC: CPT | Performed by: INTERNAL MEDICINE

## 2019-05-04 PROCEDURE — 80048 BASIC METABOLIC PNL TOTAL CA: CPT | Performed by: STUDENT IN AN ORGANIZED HEALTH CARE EDUCATION/TRAINING PROGRAM

## 2019-05-04 PROCEDURE — 99232 SBSQ HOSP IP/OBS MODERATE 35: CPT | Mod: GC | Performed by: INTERNAL MEDICINE

## 2019-05-04 PROCEDURE — 25000131 ZZH RX MED GY IP 250 OP 636 PS 637: Performed by: STUDENT IN AN ORGANIZED HEALTH CARE EDUCATION/TRAINING PROGRAM

## 2019-05-04 PROCEDURE — 21400000 ZZH R&B CCU UMMC

## 2019-05-04 PROCEDURE — 25000131 ZZH RX MED GY IP 250 OP 636 PS 637: Performed by: INTERNAL MEDICINE

## 2019-05-04 PROCEDURE — 36415 COLL VENOUS BLD VENIPUNCTURE: CPT | Performed by: INTERNAL MEDICINE

## 2019-05-04 PROCEDURE — 25000125 ZZHC RX 250: Performed by: INTERNAL MEDICINE

## 2019-05-04 PROCEDURE — 83735 ASSAY OF MAGNESIUM: CPT | Performed by: STUDENT IN AN ORGANIZED HEALTH CARE EDUCATION/TRAINING PROGRAM

## 2019-05-04 PROCEDURE — 80048 BASIC METABOLIC PNL TOTAL CA: CPT | Performed by: INTERNAL MEDICINE

## 2019-05-04 PROCEDURE — 25000132 ZZH RX MED GY IP 250 OP 250 PS 637: Performed by: INTERNAL MEDICINE

## 2019-05-04 PROCEDURE — 85610 PROTHROMBIN TIME: CPT | Performed by: INTERNAL MEDICINE

## 2019-05-04 PROCEDURE — 94640 AIRWAY INHALATION TREATMENT: CPT

## 2019-05-04 PROCEDURE — 25000128 H RX IP 250 OP 636: Performed by: INTERNAL MEDICINE

## 2019-05-04 PROCEDURE — 25000128 H RX IP 250 OP 636: Performed by: STUDENT IN AN ORGANIZED HEALTH CARE EDUCATION/TRAINING PROGRAM

## 2019-05-04 PROCEDURE — 83735 ASSAY OF MAGNESIUM: CPT | Performed by: INTERNAL MEDICINE

## 2019-05-04 PROCEDURE — 84550 ASSAY OF BLOOD/URIC ACID: CPT | Performed by: INTERNAL MEDICINE

## 2019-05-04 RX ORDER — BUMETANIDE 0.25 MG/ML
2 INJECTION INTRAMUSCULAR; INTRAVENOUS 2 TIMES DAILY
Status: DISCONTINUED | OUTPATIENT
Start: 2019-05-05 | End: 2019-05-05

## 2019-05-04 RX ORDER — POTASSIUM CHLORIDE 750 MG/1
20-40 TABLET, EXTENDED RELEASE ORAL
Status: DISCONTINUED | OUTPATIENT
Start: 2019-05-04 | End: 2019-05-04

## 2019-05-04 RX ORDER — BUMETANIDE 0.25 MG/ML
4 INJECTION INTRAMUSCULAR; INTRAVENOUS ONCE
Status: COMPLETED | OUTPATIENT
Start: 2019-05-04 | End: 2019-05-04

## 2019-05-04 RX ORDER — POTASSIUM CHLORIDE 29.8 MG/ML
20 INJECTION INTRAVENOUS
Status: DISCONTINUED | OUTPATIENT
Start: 2019-05-04 | End: 2019-05-08 | Stop reason: HOSPADM

## 2019-05-04 RX ORDER — POTASSIUM CHLORIDE 1.5 G/1.58G
20-40 POWDER, FOR SOLUTION ORAL
Status: DISCONTINUED | OUTPATIENT
Start: 2019-05-04 | End: 2019-05-04

## 2019-05-04 RX ORDER — MAGNESIUM SULFATE HEPTAHYDRATE 40 MG/ML
4 INJECTION, SOLUTION INTRAVENOUS EVERY 4 HOURS PRN
Status: DISCONTINUED | OUTPATIENT
Start: 2019-05-04 | End: 2019-05-04

## 2019-05-04 RX ORDER — POTASSIUM CHLORIDE 29.8 MG/ML
20 INJECTION INTRAVENOUS
Status: DISCONTINUED | OUTPATIENT
Start: 2019-05-04 | End: 2019-05-04

## 2019-05-04 RX ORDER — IPRATROPIUM BROMIDE AND ALBUTEROL SULFATE 2.5; .5 MG/3ML; MG/3ML
3 SOLUTION RESPIRATORY (INHALATION) EVERY 4 HOURS PRN
Status: DISCONTINUED | OUTPATIENT
Start: 2019-05-04 | End: 2019-05-08 | Stop reason: HOSPADM

## 2019-05-04 RX ORDER — BUMETANIDE 0.25 MG/ML
2 INJECTION INTRAMUSCULAR; INTRAVENOUS ONCE
Status: DISCONTINUED | OUTPATIENT
Start: 2019-05-04 | End: 2019-05-04

## 2019-05-04 RX ORDER — POTASSIUM CHLORIDE 750 MG/1
20-40 TABLET, EXTENDED RELEASE ORAL
Status: DISCONTINUED | OUTPATIENT
Start: 2019-05-04 | End: 2019-05-08 | Stop reason: HOSPADM

## 2019-05-04 RX ORDER — POTASSIUM CL/LIDO/0.9 % NACL 10MEQ/0.1L
10 INTRAVENOUS SOLUTION, PIGGYBACK (ML) INTRAVENOUS
Status: DISCONTINUED | OUTPATIENT
Start: 2019-05-04 | End: 2019-05-08 | Stop reason: HOSPADM

## 2019-05-04 RX ORDER — POTASSIUM CL/LIDO/0.9 % NACL 10MEQ/0.1L
10 INTRAVENOUS SOLUTION, PIGGYBACK (ML) INTRAVENOUS
Status: DISCONTINUED | OUTPATIENT
Start: 2019-05-04 | End: 2019-05-04

## 2019-05-04 RX ORDER — POTASSIUM CHLORIDE 1.5 G/1.58G
20-40 POWDER, FOR SOLUTION ORAL
Status: DISCONTINUED | OUTPATIENT
Start: 2019-05-04 | End: 2019-05-08 | Stop reason: HOSPADM

## 2019-05-04 RX ORDER — POTASSIUM CHLORIDE 7.45 MG/ML
10 INJECTION INTRAVENOUS
Status: DISCONTINUED | OUTPATIENT
Start: 2019-05-04 | End: 2019-05-04

## 2019-05-04 RX ORDER — POTASSIUM CHLORIDE 7.45 MG/ML
10 INJECTION INTRAVENOUS
Status: DISCONTINUED | OUTPATIENT
Start: 2019-05-04 | End: 2019-05-08 | Stop reason: HOSPADM

## 2019-05-04 RX ADMIN — ENOXAPARIN SODIUM 40 MG: 40 INJECTION SUBCUTANEOUS at 14:12

## 2019-05-04 RX ADMIN — Medication 2 G: at 18:47

## 2019-05-04 RX ADMIN — BUMETANIDE 4 MG: 0.25 INJECTION INTRAMUSCULAR; INTRAVENOUS at 15:57

## 2019-05-04 RX ADMIN — SENNOSIDES 8.6 MG: 8.6 TABLET, FILM COATED ORAL at 11:59

## 2019-05-04 RX ADMIN — MAGNESIUM OXIDE TAB 400 MG (241.3 MG ELEMENTAL MG) 400 MG: 400 (241.3 MG) TAB at 19:27

## 2019-05-04 RX ADMIN — NYSTATIN 500000 UNITS: 100000 SUSPENSION ORAL at 19:27

## 2019-05-04 RX ADMIN — OMEPRAZOLE 40 MG: 20 CAPSULE, DELAYED RELEASE ORAL at 19:27

## 2019-05-04 RX ADMIN — PREDNISONE 5 MG: 5 TABLET ORAL at 08:43

## 2019-05-04 RX ADMIN — FLUTICASONE PROPIONATE 1 SPRAY: 50 SPRAY, METERED NASAL at 08:42

## 2019-05-04 RX ADMIN — IPRATROPIUM BROMIDE AND ALBUTEROL SULFATE 3 ML: .5; 3 SOLUTION RESPIRATORY (INHALATION) at 15:38

## 2019-05-04 RX ADMIN — ACETAMINOPHEN 650 MG: 325 TABLET, FILM COATED ORAL at 19:31

## 2019-05-04 RX ADMIN — CEFTAZIDIME 1 G: 1 INJECTION, POWDER, FOR SOLUTION INTRAMUSCULAR; INTRAVENOUS at 00:28

## 2019-05-04 RX ADMIN — ASPIRIN 81 MG CHEWABLE TABLET 81 MG: 81 TABLET CHEWABLE at 08:43

## 2019-05-04 RX ADMIN — BALSALAZIDE DISODIUM 1500 MG: 750 CAPSULE ORAL at 19:27

## 2019-05-04 RX ADMIN — NYSTATIN 500000 UNITS: 100000 SUSPENSION ORAL at 08:44

## 2019-05-04 RX ADMIN — CEFTAZIDIME 1 G: 1 INJECTION, POWDER, FOR SOLUTION INTRAMUSCULAR; INTRAVENOUS at 11:59

## 2019-05-04 RX ADMIN — IPRATROPIUM BROMIDE AND ALBUTEROL SULFATE 3 ML: .5; 3 SOLUTION RESPIRATORY (INHALATION) at 07:54

## 2019-05-04 RX ADMIN — LEVOTHYROXINE SODIUM 100 MCG: 25 TABLET ORAL at 08:42

## 2019-05-04 RX ADMIN — PREDNISONE 5 MG: 5 TABLET ORAL at 17:22

## 2019-05-04 RX ADMIN — IPRATROPIUM BROMIDE AND ALBUTEROL SULFATE 3 ML: .5; 3 SOLUTION RESPIRATORY (INHALATION) at 11:30

## 2019-05-04 RX ADMIN — TACROLIMUS 3 MG: 1 CAPSULE ORAL at 08:43

## 2019-05-04 RX ADMIN — POTASSIUM CHLORIDE 20 MEQ: 750 TABLET, EXTENDED RELEASE ORAL at 18:47

## 2019-05-04 RX ADMIN — NYSTATIN 500000 UNITS: 100000 SUSPENSION ORAL at 15:57

## 2019-05-04 RX ADMIN — NYSTATIN 500000 UNITS: 100000 SUSPENSION ORAL at 11:59

## 2019-05-04 RX ADMIN — BALSALAZIDE DISODIUM 1500 MG: 750 CAPSULE ORAL at 08:43

## 2019-05-04 RX ADMIN — MONTELUKAST SODIUM 10 MG: 10 TABLET, COATED ORAL at 20:10

## 2019-05-04 RX ADMIN — BUMETANIDE 4 MG: 0.25 INJECTION INTRAMUSCULAR; INTRAVENOUS at 08:54

## 2019-05-04 RX ADMIN — FLUTICASONE PROPIONATE 1 SPRAY: 50 SPRAY, METERED NASAL at 19:27

## 2019-05-04 RX ADMIN — MELATONIN TAB 3 MG 6 MG: 3 TAB at 20:10

## 2019-05-04 RX ADMIN — MAGNESIUM OXIDE TAB 400 MG (241.3 MG ELEMENTAL MG) 400 MG: 400 (241.3 MG) TAB at 08:43

## 2019-05-04 RX ADMIN — DULOXETINE 20 MG: 20 CAPSULE, DELAYED RELEASE ORAL at 08:43

## 2019-05-04 RX ADMIN — ROSUVASTATIN CALCIUM 10 MG: 10 TABLET, FILM COATED ORAL at 08:43

## 2019-05-04 RX ADMIN — TACROLIMUS 2.5 MG: 1 CAPSULE ORAL at 17:22

## 2019-05-04 ASSESSMENT — ACTIVITIES OF DAILY LIVING (ADL)
ADLS_ACUITY_SCORE: 14

## 2019-05-04 ASSESSMENT — MIFFLIN-ST. JEOR: SCORE: 1390.55

## 2019-05-04 NOTE — PLAN OF CARE
Discharge Planner OT   6C CR  Patient plan for discharge: home w family and resume OP CR once spine MD clears him for CR  Current status: Pt reports being at baseline although limited activity tolerance due to abdominal bloating and fluid.  With set up and cues for fall prevention pt to IND perform ADLs. RN aware pt in shower.  VSS  Set goal to walk with nursing 3 x today.   ADL goals met, will continue to see pt for Phase I CR.  Barriers to return to prior living situation: medical needs  Recommendations for discharge: home w A and OP CR  Rationale for recommendations: Pt recently dc from ARU after OHT, continues to be below his baseline from 2/24 transplant.        Entered by: Monique Marsh 05/04/2019 11:02 AM

## 2019-05-04 NOTE — PLAN OF CARE
"/89 (BP Location: Left arm)   Pulse 99   Temp 98.4  F (36.9  C) (Oral)   Resp 20   Ht 1.727 m (5' 8\")   Wt 58.6 kg (129 lb 3.2 oz)   SpO2 99%   BMI 19.64 kg/m      Patient admitted 4/29 for pseudomonas bacteremia and recent T12 fracture (fall at home 4/21). History of NICM s/p heart transplant 2/24/19, paroxysmal a fib, ulcerative colitis, hypothyroid, GERD, BPH and depression    6500-4095: A/Ox4, VSS on RA. SR/ST. Pain controlled with PRN tylenol. PIV SL. Voiding spontaneously, adequate amounts. Up ad janeth in room. Will continue to monitor per POC.   "

## 2019-05-04 NOTE — PROGRESS NOTES
Cardiology Progress Note  Everton Larios MRN: [id number]  Age: 54 year old, : 1965  05/04/19    I personally saw and examined this patient, reviewed imaging and laboratory studies, confirmed physical examination and discussed results and plan with patient and resident.  He appears to be clinically stable and arrangements are moises made for prolonged outpatient antibiotic course.          Changes Today:     - Continue Ceftazidime   - Reassess need for dose of pentamidine next week (held Bactrim due to low WBCs)  - Bumex 4mg IV BID  - Will need weekly CMV checked (next due , ordered)  - Will need routine heart biopsy next week (~)  - Check DSA next week (ordered for )        Assessment and Plan:     Everton Larios is a 54 year old male with a history of NICM s/p OHT (2019), paroxysmal atrial fibrillation, recent T12 fracture (2019), ulcerative colitis, Pierce's esophagus, hypothyroidism, GERD, BPH and depression who is admitted with Pseudomonas bacteremia, new T12 fracture and multiple new pulmonary groundglass nodules.      #. Pseudomonas bacteremia  Blood cx from  shows pseudomonas bacteremia. Etiology likely pulmonary (possible pseudomonas pneumonia) with CT Chest on  revealing new diffuse bilateral solid with peripheral groundglass pulmonary nodules/opacities and  mediastinal adenopathy.   - Transplant ID following; appreciate assistance   - Pending cx susceptibilities  - Repeat blood cx NGTD  - Antibiotics:              - Ceftazidime  -               - Cefepime -               - Zosyn -              - One time tobramycin 240mg IV                - Azithromycin -                             - Vancomycin -,      #. Pulmonary groundglass nodules  Notable productive cough of yellow sputum over the past few days, chest discomfort and sinus congestion noted. CXR with new nodular opacity overlying the right  upper lobe. CT chest shows new diffuse bilateral solid with peripheral groundglass pulmonary nodules/opacities and  mediastinal adenopathy. Also concern for possible fungal infection.   - Pulmonology consulted; appreciate assistance   - Bronchoscopy cultures obtained 4/30/2019: growing pseudomonas   - Transplant ID consulted; appreciate assistance  - Aspergillus galactomannan Ag pending (4/30)  - B-D Glucan positive from 4/30, did not start anti-fungal due to unclear infectious source for the positive B-D glucan and in setting of prolonged QTc  - Histoplasma U Ag pending  - IgG 106, IgM 29, IgA 106 from 5/1   - Influenza A/B, RSV PCR negative   - Tessalon capsules prn   - Albuterol prn, DuoNebs scheduled      #. Neutropenia   Afebrile and tachycardic to 120s on admission. Lactate wnl.  initially. UA unremarkable. Patient has noted subjective fevers at home in additional to upper respiratory tract symptoms, diffuse abdominal pain, ~5 episodes of non-bloody diarrhea in past 24 hours with stool softener use. Neutropenia likely in the setting of acute infection from bacteremia of respiratory (bacterial, fungal) or GI etiology (EBV, CMV, URI), medication-induced (bactrim, cellcept and valcyte). Hematological or rheumatological etiologies less likely. C.diff negative.   - Neupogen dose 5/1, 5/2  - Neutropenic precautions   - Transplant ID consulted; appreciate recommendations   - Held home cellcept, valcyte and bactrim  - CMV and EBV PCR negative 4/29, next check 3/7, ordered  - Antibiotics: see above     #. Abdominal pain  #. Non-bloody diarrhea   Lactate wnl. Pain predominantly epigastric, RUQ and LLQ on exam. Lipase wnl. Considered CMV colitis, C.diff, EBV, UC flare, typhilitis contributing to diarrhea and abdominal pain. CT A/P shows nonspecific small area of poorly defined heterogeneous posterior right hepatic parenchyma possibly secondary to the hepatic fibrosis.   - C. Diff and enteric panel negative   - CMV  and EBV PCR 4/29 negative     #. NICM s/p OHT (2/24/2019) with RV dysfunction  Most recent biopsy 4/24/2019 with 1 R mild focal acute cellular rejection with no AMR. Bactrim and Valcyte held since 4/26/2019 due to leukopenia. Serostatus: CMV+, EBV+, HSV1+, HSV2 neg, VZV+.   - Immunosuppresives:                - Held home cellcept               - Continue tacrolimus 3mg qAM and 2.5mg qPM; goal of 8 - 10               - Continue prednisone 5mg BID   - PPx: held valcyte and bactrim in setting of neutropenia. Continue nystatin.   - Diuretics: bumex 2mg IV BID, additional dose of 2mg IV this AM  - ASA 81mg daily  - Rosuvastatin 10mg daily   - Tacrolimus level 4/29 was 9.6, next check 5/3  - Weekly CMV ordered  - Repeat heart biopsy on 5/8/2019  - Check DSA next week (ordered for 5/8)      #. Ulcerative colitis: continue home balsalazide 1500mg BID  #. Hypothyroidism: continue levothyroxine   #. GERD and history of Pierce's esophagus: continue omeprazole   #. Depression: continue duloxetine   #. Seasonal allergies: azelastine 0.1% nasal spray, fluticasone, montelukast   #. Insomnia: continue melatonin     Diet/IVF: cardiac diet, NO IVF  DVT ppx: Ambulate  Disposition/Admission Status: admitted for further pseudomonas bacteremia   CODE: Full Code     Patient was discussed with staff attending, Dr. Beck.     Giuseppe Fritz MD  PGY-2 Internal Medicine  Cardiology Service             Subjective     Nursing notes reviewed. No acute events overnight. Seen this morning, feels better. Denies chest pain, shortness of breath, abdominal pain, n/v, bowel or urinary changes.           Objective     Vitals:  Temp:  [97.8  F (36.6  C)-98.2  F (36.8  C)] 97.8  F (36.6  C)  Heart Rate:  [103-115] 103  Resp:  [16-18] 16  BP: (108-131)/(85-91) 117/85  Cuff Mean (mmHg):  [] 96  SpO2:  [98 %-100 %] 99 %  MAP: 90s    I/O: Net -1.4L over past 24hrs    Vitals:    05/02/19 0800 05/03/19 0739 05/04/19 0646   Weight: 61.3 kg (135 lb  3.2 oz) 60.9 kg (134 lb 4.8 oz) 58.6 kg (129 lb 3.2 oz)       Gen: NAD, pleasant  HEENT: No scleral icterus, Moist mucous membranes.   CV:  RRR, S1 S2 nl, no murmurs, rubs or gallops   Resp: CTAB, no crackles or wheezing   Abdomen: Soft, diffuse abdominal pain, normal active bowel sounds  Extremities: No peripheral edema, warm  Skin: sternotomy scar across torso   Neuro: asleep, grossly non-focal              Data:      Labs reviewed.         Medications     Medications    aspirin  81 mg Oral Daily     balsalazide  1,500 mg Oral BID     bumetanide  2 mg Intravenous BID     cefTAZidime  1 g Intravenous Q12H     DULoxetine  20 mg Oral Daily     enoxaparin  40 mg Subcutaneous Q24H     fluticasone  1 spray Both Nostrils BID     ipratropium - albuterol 0.5 mg/2.5 mg/3 mL  3 mL Nebulization 4x daily     levothyroxine  100 mcg Oral Daily     magnesium oxide  400 mg Oral BID     melatonin  6 mg Oral At Bedtime     montelukast  10 mg Oral At Bedtime     nystatin  500,000 Units Oral 4x Daily     omeprazole  40 mg Oral QPM     predniSONE  5 mg Oral BID w/meals     rosuvastatin  10 mg Oral Daily     tacrolimus  2.5 mg Oral QPM     tacrolimus  3 mg Oral QAM       ACE/ARB/ARNI NOT PRESCRIBED       BETA BLOCKER NOT PRESCRIBED

## 2019-05-04 NOTE — PLAN OF CARE
"/85 (BP Location: Left arm)   Pulse 99   Temp 97.8  F (36.6  C) (Oral)   Resp 16   Ht 1.727 m (5' 8\")   Wt 60.9 kg (134 lb 4.8 oz)   SpO2 99%   BMI 20.42 kg/m      Patient admitted 4/29 for pseudomonas bacteremia and recent T12 fracture (fall at home 4/21). History of NICM s/p heart transplant 2/24/19, paroxysmal a fib, ulcerative colitis, hypothyroid, GERD, BPH and depression.    Shift: [id number]  Neuro: A/Ox4, denies numbness or tingling.   Cardiac: SR, VSS.   Respiratory: 1L nasal cannula as needed. Expiratory wheezes noted.   GI/: Voiding spontaneously, adequate amounts.   Diet/Appetite: Regular diet with 2000cc FR.   Activity: Up ad janeth.   Pain: Pain controlled with PRN tylenol.   Skin: No new deficits noted.  LDA's: PIV, SL.    Plan: Will continue to monitor per POC.     "

## 2019-05-05 ENCOUNTER — APPOINTMENT (OUTPATIENT)
Dept: OCCUPATIONAL THERAPY | Facility: CLINIC | Age: 54
DRG: 853 | End: 2019-05-05
Payer: COMMERCIAL

## 2019-05-05 LAB
ANION GAP SERPL CALCULATED.3IONS-SCNC: 10 MMOL/L (ref 3–14)
ANION GAP SERPL CALCULATED.3IONS-SCNC: 8 MMOL/L (ref 3–14)
ANISOCYTOSIS BLD QL SMEAR: SLIGHT
BACTERIA SPEC CULT: NO GROWTH
BASOPHILS # BLD AUTO: 0.1 10E9/L (ref 0–0.2)
BASOPHILS NFR BLD AUTO: 0.9 %
BUN SERPL-MCNC: 21 MG/DL (ref 7–30)
BUN SERPL-MCNC: 21 MG/DL (ref 7–30)
CALCIUM SERPL-MCNC: 8.6 MG/DL (ref 8.5–10.1)
CALCIUM SERPL-MCNC: 8.8 MG/DL (ref 8.5–10.1)
CHLORIDE SERPL-SCNC: 104 MMOL/L (ref 94–109)
CHLORIDE SERPL-SCNC: 106 MMOL/L (ref 94–109)
CO2 SERPL-SCNC: 21 MMOL/L (ref 20–32)
CO2 SERPL-SCNC: 23 MMOL/L (ref 20–32)
CREAT SERPL-MCNC: 1.64 MG/DL (ref 0.66–1.25)
CREAT SERPL-MCNC: 1.68 MG/DL (ref 0.66–1.25)
DIFFERENTIAL METHOD BLD: ABNORMAL
EOSINOPHIL # BLD AUTO: 0.1 10E9/L (ref 0–0.7)
EOSINOPHIL NFR BLD AUTO: 1.7 %
ERYTHROCYTE [DISTWIDTH] IN BLOOD BY AUTOMATED COUNT: 17 % (ref 10–15)
GFR SERPL CREATININE-BSD FRML MDRD: 45 ML/MIN/{1.73_M2}
GFR SERPL CREATININE-BSD FRML MDRD: 46 ML/MIN/{1.73_M2}
GLUCOSE SERPL-MCNC: 70 MG/DL (ref 70–99)
GLUCOSE SERPL-MCNC: 78 MG/DL (ref 70–99)
HCT VFR BLD AUTO: 26.6 % (ref 40–53)
HGB BLD-MCNC: 8 G/DL (ref 13.3–17.7)
INR PPP: 1.33 (ref 0.86–1.14)
LYMPHOCYTES # BLD AUTO: 1.6 10E9/L (ref 0.8–5.3)
LYMPHOCYTES NFR BLD AUTO: 21.7 %
Lab: NORMAL
MAGNESIUM SERPL-MCNC: 2 MG/DL (ref 1.6–2.3)
MAGNESIUM SERPL-MCNC: 2.1 MG/DL (ref 1.6–2.3)
MCH RBC QN AUTO: 29.4 PG (ref 26.5–33)
MCHC RBC AUTO-ENTMCNC: 30.1 G/DL (ref 31.5–36.5)
MCV RBC AUTO: 98 FL (ref 78–100)
METAMYELOCYTES # BLD: 0.1 10E9/L
METAMYELOCYTES NFR BLD MANUAL: 0.9 %
MONOCYTES # BLD AUTO: 0.8 10E9/L (ref 0–1.3)
MONOCYTES NFR BLD AUTO: 10.4 %
MYELOCYTES # BLD: 0.1 10E9/L
MYELOCYTES NFR BLD MANUAL: 0.9 %
NEUTROPHILS # BLD AUTO: 4.8 10E9/L (ref 1.6–8.3)
NEUTROPHILS NFR BLD AUTO: 62.6 %
OVALOCYTES BLD QL SMEAR: SLIGHT
PLATELET # BLD AUTO: 299 10E9/L (ref 150–450)
PLATELET # BLD EST: ABNORMAL 10*3/UL
POIKILOCYTOSIS BLD QL SMEAR: SLIGHT
POTASSIUM SERPL-SCNC: 3.7 MMOL/L (ref 3.4–5.3)
POTASSIUM SERPL-SCNC: 4.2 MMOL/L (ref 3.4–5.3)
PROMYELOCYTES # BLD MANUAL: 0.1 10E9/L
PROMYELOCYTES NFR BLD MANUAL: 0.9 %
RBC # BLD AUTO: 2.72 10E12/L (ref 4.4–5.9)
SODIUM SERPL-SCNC: 135 MMOL/L (ref 133–144)
SODIUM SERPL-SCNC: 137 MMOL/L (ref 133–144)
SPECIMEN SOURCE: NORMAL
TACROLIMUS BLD-MCNC: 9.2 UG/L (ref 5–15)
TME LAST DOSE: NORMAL H
WBC # BLD AUTO: 7.6 10E9/L (ref 4–11)

## 2019-05-05 PROCEDURE — 83735 ASSAY OF MAGNESIUM: CPT | Performed by: STUDENT IN AN ORGANIZED HEALTH CARE EDUCATION/TRAINING PROGRAM

## 2019-05-05 PROCEDURE — 25000125 ZZHC RX 250: Performed by: INTERNAL MEDICINE

## 2019-05-05 PROCEDURE — 80197 ASSAY OF TACROLIMUS: CPT | Performed by: INTERNAL MEDICINE

## 2019-05-05 PROCEDURE — 36415 COLL VENOUS BLD VENIPUNCTURE: CPT | Performed by: INTERNAL MEDICINE

## 2019-05-05 PROCEDURE — 25000132 ZZH RX MED GY IP 250 OP 250 PS 637: Performed by: INTERNAL MEDICINE

## 2019-05-05 PROCEDURE — 25000132 ZZH RX MED GY IP 250 OP 250 PS 637: Performed by: STUDENT IN AN ORGANIZED HEALTH CARE EDUCATION/TRAINING PROGRAM

## 2019-05-05 PROCEDURE — 25000128 H RX IP 250 OP 636: Performed by: STUDENT IN AN ORGANIZED HEALTH CARE EDUCATION/TRAINING PROGRAM

## 2019-05-05 PROCEDURE — 21400000 ZZH R&B CCU UMMC

## 2019-05-05 PROCEDURE — 25000131 ZZH RX MED GY IP 250 OP 636 PS 637: Performed by: STUDENT IN AN ORGANIZED HEALTH CARE EDUCATION/TRAINING PROGRAM

## 2019-05-05 PROCEDURE — 85610 PROTHROMBIN TIME: CPT | Performed by: INTERNAL MEDICINE

## 2019-05-05 PROCEDURE — 80048 BASIC METABOLIC PNL TOTAL CA: CPT | Performed by: STUDENT IN AN ORGANIZED HEALTH CARE EDUCATION/TRAINING PROGRAM

## 2019-05-05 PROCEDURE — 85025 COMPLETE CBC W/AUTO DIFF WBC: CPT | Performed by: INTERNAL MEDICINE

## 2019-05-05 PROCEDURE — 97110 THERAPEUTIC EXERCISES: CPT | Mod: GO | Performed by: OCCUPATIONAL THERAPIST

## 2019-05-05 PROCEDURE — 83735 ASSAY OF MAGNESIUM: CPT | Performed by: INTERNAL MEDICINE

## 2019-05-05 PROCEDURE — 40000275 ZZH STATISTIC RCP TIME EA 10 MIN

## 2019-05-05 PROCEDURE — 36415 COLL VENOUS BLD VENIPUNCTURE: CPT | Performed by: STUDENT IN AN ORGANIZED HEALTH CARE EDUCATION/TRAINING PROGRAM

## 2019-05-05 PROCEDURE — 80048 BASIC METABOLIC PNL TOTAL CA: CPT | Performed by: INTERNAL MEDICINE

## 2019-05-05 PROCEDURE — 25000131 ZZH RX MED GY IP 250 OP 636 PS 637: Performed by: INTERNAL MEDICINE

## 2019-05-05 PROCEDURE — 94640 AIRWAY INHALATION TREATMENT: CPT

## 2019-05-05 PROCEDURE — 99232 SBSQ HOSP IP/OBS MODERATE 35: CPT | Mod: GC | Performed by: INTERNAL MEDICINE

## 2019-05-05 RX ORDER — BUMETANIDE 0.25 MG/ML
2 INJECTION INTRAMUSCULAR; INTRAVENOUS 2 TIMES DAILY
Status: COMPLETED | OUTPATIENT
Start: 2019-05-05 | End: 2019-05-05

## 2019-05-05 RX ORDER — BUMETANIDE 1 MG/1
2 TABLET ORAL
Status: DISCONTINUED | OUTPATIENT
Start: 2019-05-06 | End: 2019-05-08 | Stop reason: HOSPADM

## 2019-05-05 RX ADMIN — NYSTATIN 500000 UNITS: 100000 SUSPENSION ORAL at 09:41

## 2019-05-05 RX ADMIN — TACROLIMUS 3 MG: 1 CAPSULE ORAL at 09:40

## 2019-05-05 RX ADMIN — ROSUVASTATIN CALCIUM 10 MG: 10 TABLET, FILM COATED ORAL at 09:40

## 2019-05-05 RX ADMIN — CEFTAZIDIME 1 G: 1 INJECTION, POWDER, FOR SOLUTION INTRAMUSCULAR; INTRAVENOUS at 11:51

## 2019-05-05 RX ADMIN — MELATONIN TAB 3 MG 6 MG: 3 TAB at 19:57

## 2019-05-05 RX ADMIN — MAGNESIUM OXIDE TAB 400 MG (241.3 MG ELEMENTAL MG) 400 MG: 400 (241.3 MG) TAB at 09:42

## 2019-05-05 RX ADMIN — LEVOTHYROXINE SODIUM 100 MCG: 25 TABLET ORAL at 09:39

## 2019-05-05 RX ADMIN — BALSALAZIDE DISODIUM 1500 MG: 750 CAPSULE ORAL at 09:39

## 2019-05-05 RX ADMIN — POTASSIUM CHLORIDE 20 MEQ: 750 TABLET, EXTENDED RELEASE ORAL at 09:57

## 2019-05-05 RX ADMIN — BALSALAZIDE DISODIUM 1500 MG: 750 CAPSULE ORAL at 19:57

## 2019-05-05 RX ADMIN — TACROLIMUS 2.5 MG: 1 CAPSULE ORAL at 17:25

## 2019-05-05 RX ADMIN — BUMETANIDE 2 MG: 0.25 INJECTION INTRAMUSCULAR; INTRAVENOUS at 09:42

## 2019-05-05 RX ADMIN — ACETAMINOPHEN 650 MG: 325 TABLET, FILM COATED ORAL at 11:56

## 2019-05-05 RX ADMIN — PREDNISONE 5 MG: 5 TABLET ORAL at 09:41

## 2019-05-05 RX ADMIN — NYSTATIN 500000 UNITS: 100000 SUSPENSION ORAL at 15:11

## 2019-05-05 RX ADMIN — Medication 2 G: at 20:02

## 2019-05-05 RX ADMIN — NYSTATIN 500000 UNITS: 100000 SUSPENSION ORAL at 19:58

## 2019-05-05 RX ADMIN — IPRATROPIUM BROMIDE AND ALBUTEROL SULFATE 3 ML: .5; 3 SOLUTION RESPIRATORY (INHALATION) at 21:08

## 2019-05-05 RX ADMIN — PREDNISONE 5 MG: 5 TABLET ORAL at 17:25

## 2019-05-05 RX ADMIN — CEFTAZIDIME 1 G: 1 INJECTION, POWDER, FOR SOLUTION INTRAMUSCULAR; INTRAVENOUS at 01:12

## 2019-05-05 RX ADMIN — DULOXETINE 20 MG: 20 CAPSULE, DELAYED RELEASE ORAL at 09:41

## 2019-05-05 RX ADMIN — FLUTICASONE PROPIONATE 1 SPRAY: 50 SPRAY, METERED NASAL at 09:42

## 2019-05-05 RX ADMIN — NYSTATIN 500000 UNITS: 100000 SUSPENSION ORAL at 11:51

## 2019-05-05 RX ADMIN — OMEPRAZOLE 40 MG: 20 CAPSULE, DELAYED RELEASE ORAL at 19:57

## 2019-05-05 RX ADMIN — BUMETANIDE 2 MG: 0.25 INJECTION INTRAMUSCULAR; INTRAVENOUS at 18:41

## 2019-05-05 RX ADMIN — ACETAMINOPHEN 650 MG: 325 TABLET, FILM COATED ORAL at 20:35

## 2019-05-05 RX ADMIN — ASPIRIN 81 MG CHEWABLE TABLET 81 MG: 81 TABLET CHEWABLE at 09:39

## 2019-05-05 RX ADMIN — MAGNESIUM OXIDE TAB 400 MG (241.3 MG ELEMENTAL MG) 400 MG: 400 (241.3 MG) TAB at 19:58

## 2019-05-05 RX ADMIN — MONTELUKAST SODIUM 10 MG: 10 TABLET, COATED ORAL at 19:58

## 2019-05-05 ASSESSMENT — ACTIVITIES OF DAILY LIVING (ADL)
ADLS_ACUITY_SCORE: 14
ADLS_ACUITY_SCORE: 15
ADLS_ACUITY_SCORE: 15
ADLS_ACUITY_SCORE: 14

## 2019-05-05 ASSESSMENT — PAIN DESCRIPTION - DESCRIPTORS
DESCRIPTORS: HEADACHE
DESCRIPTORS: DISCOMFORT

## 2019-05-05 ASSESSMENT — MIFFLIN-ST. JEOR: SCORE: 1377.85

## 2019-05-05 NOTE — PLAN OF CARE
Discharge Planner OT   6C   Patient plan for discharge: home asap  Current status: Pt reports slight improvement in activity tolerance but reports ongoing fatigue.  With energy management strategies pt tolerated increased distance with 400 feet ambulation with prolonged seated rest at 200 feet.  VSS although slight hypertension noted after ex.    Barriers to return to prior living situation: medical needs  Recommendations for discharge: home w OP CR   assist from family PRN  Rationale for recommendations: Pt has not returned to baseline prior to OHT, would benefit form ongoing OP therapies to increase strength and endurance for ADLs and IADLs to allow him to return to his home vs staying with his parents.        Entered by: Monique Marsh 05/05/2019 3:37 PM

## 2019-05-05 NOTE — PLAN OF CARE
Temp 98.9,HR 98,sinus tachycardia, /91,%,c/o headache tylenol given which was partially  Effective.pt is up independently ,A&O x 4 LS with inspiratory wheezes pt. has frequent  productive cough. BS+,adequate urinary output.Will continue to monitor.

## 2019-05-05 NOTE — PLAN OF CARE
Alert and oriented x4, VSS on RA. R PIV SL with intermittent IV abx. Hx heart transplant in 2/2019. Adequate urine output overnight. Able to call appropriately and make needs known. Ambulating well independently in the halls. Continue plan of care.

## 2019-05-05 NOTE — PLAN OF CARE
Pt with hx of heart tx 2/2019, pseudonomas bacteremia, T12 fx, and ulcerative colitis, continues on IV abx. Diuresing well on IV bumex. WBC 6.1. Taking tylenol for back and abdominal pain with some relief. Last K+ 3.8 and mag 1.6, lytes replaced. Pt ambulating well independently in the halls. VSS except for [id number]. Sats good on RA, denies SOB.

## 2019-05-05 NOTE — PROGRESS NOTES
Cardiology Progress Note  Everton Larios MRN: [id number]  Age: 54 year old, : 1965  05/05/19      I personally reviewed the patient's interim history, available interim laboratories and imaging studies.  I confirmed the physical findings and observations of the trainee with whom I saw and discussed the patient.  I agree with the finding and observations elaborated and agree with plan.  Rosendo Beck,      Changes Today:     - Continue Ceftazidime   - Reassess need for dose of pentamidine next week (held Bactrim due to low WBCs)  - Bumex 2mg IV BID, change to po tomorrow   - Will need weekly CMV checked (next due , ordered)  - Will need routine heart biopsy next week (~)  - Check DSA next week (ordered for )  - Tacrolimus 9.2, at goal, no dose changes at this time        Assessment and Plan:     Everton Larios is a 54 year old male with a history of NICM s/p OHT (2019), paroxysmal atrial fibrillation, recent T12 fracture (2019), ulcerative colitis, Pierce's esophagus, hypothyroidism, GERD, BPH and depression who is admitted with Pseudomonas bacteremia, new T12 fracture and multiple new pulmonary groundglass nodules.      #. Pseudomonas bacteremia  Blood cx from  shows pseudomonas bacteremia. Etiology likely pulmonary (possible pseudomonas pneumonia) with CT Chest on  revealing new diffuse bilateral solid with peripheral groundglass pulmonary nodules/opacities and  mediastinal adenopathy.   - Transplant ID following; appreciate assistance   - Pending cx susceptibilities  - Repeat blood cx NGTD  - Antibiotics:              - Ceftazidime  -               - Cefepime -               - Zosyn -              - One time tobramycin 240mg IV                - Azithromycin -                             - Vancomycin -,      #. Pulmonary groundglass nodules  Notable productive cough of yellow sputum over the past few  days, chest discomfort and sinus congestion noted. CXR with new nodular opacity overlying the right upper lobe. CT chest shows new diffuse bilateral solid with peripheral groundglass pulmonary nodules/opacities and  mediastinal adenopathy. Also concern for possible fungal infection.   - Pulmonology consulted; appreciate assistance   - Bronchoscopy cultures obtained 4/30/2019: growing pseudomonas   - Transplant ID consulted; appreciate assistance  - Aspergillus galactomannan Ag pending (4/30)  - B-D Glucan positive from 4/30, did not start anti-fungal due to unclear infectious source for the positive B-D glucan and in setting of prolonged QTc  - Histoplasma U Ag pending  - IgG 106, IgM 29, IgA 106 from 5/1   - Influenza A/B, RSV PCR negative   - Tessalon capsules prn   - Albuterol prn, DuoNebs scheduled      #. Neutropenia   Afebrile and tachycardic to 120s on admission. Lactate wnl.  initially. UA unremarkable. Patient has noted subjective fevers at home in additional to upper respiratory tract symptoms, diffuse abdominal pain, ~5 episodes of non-bloody diarrhea in past 24 hours with stool softener use. Neutropenia likely in the setting of acute infection from bacteremia of respiratory (bacterial, fungal) or GI etiology (EBV, CMV, URI), medication-induced (bactrim, cellcept and valcyte). Hematological or rheumatological etiologies less likely. C.diff negative.   - Neupogen dose 5/1, 5/2  - Neutropenic precautions   - Transplant ID consulted; appreciate recommendations   - Held home cellcept, valcyte and bactrim  - CMV and EBV PCR negative 4/29, next check 3/7, ordered  - Antibiotics: see above     #. Abdominal pain  #. Non-bloody diarrhea   Lactate wnl. Pain predominantly epigastric, RUQ and LLQ on exam. Lipase wnl. Considered CMV colitis, C.diff, EBV, UC flare, typhilitis contributing to diarrhea and abdominal pain. CT A/P shows nonspecific small area of poorly defined heterogeneous posterior right hepatic  parenchyma possibly secondary to the hepatic fibrosis.   - C. Diff and enteric panel negative   - CMV and EBV PCR 4/29 negative     #. NICM s/p OHT (2/24/2019) with RV dysfunction  Most recent biopsy 4/24/2019 with 1 R mild focal acute cellular rejection with no AMR. Bactrim and Valcyte held since 4/26/2019 due to leukopenia. Serostatus: CMV+, EBV+, HSV1+, HSV2 neg, VZV+.   - Immunosuppresives:                - Held home cellcept               - Continue tacrolimus 3mg qAM and 2.5mg qPM; goal of 8 - 10               - Continue prednisone 5mg BID   - PPx: held valcyte and bactrim in setting of neutropenia. Continue nystatin.   - Diuretics: bumex 2mg IV BID, additional dose of 2mg IV this AM  - ASA 81mg daily  - Rosuvastatin 10mg daily   - Tacrolimus level 4/29 was 9.6, next check 5/3  - Weekly CMV ordered  - Repeat heart biopsy on 5/8/2019  - Check DSA next week (ordered for 5/8)      #. Ulcerative colitis: continue home balsalazide 1500mg BID  #. Hypothyroidism: continue levothyroxine   #. GERD and history of Pierce's esophagus: continue omeprazole   #. Depression: continue duloxetine   #. Seasonal allergies: azelastine 0.1% nasal spray, fluticasone, montelukast   #. Insomnia: continue melatonin     Diet/IVF: cardiac diet, NO IVF  DVT ppx: Ambulate  Disposition/Admission Status: admitted for further pseudomonas bacteremia   CODE: Full Code     Patient was discussed with staff attending, Dr. Beck.     Giuseppe Fritz MD  PGY-2 Internal Medicine  Cardiology Service             Subjective     No acute events overnight. Seen this morning, feels better. Has no new concerns.           Objective     Vitals:  Temp:  [98.2  F (36.8  C)-98.8  F (37.1  C)] 98.4  F (36.9  C)  Pulse:  [] 98  Heart Rate:  [] 99  Resp:  [18-20] 18  BP: (121-134)/(83-96) 125/96  Cuff Mean (mmHg):  [97] 97  SpO2:  [96 %-99 %] 98 %  MAP: 90-100s     I/O: Net -1.5L over past 24hrs     Vitals:    05/03/19 0739 05/04/19 0646 05/05/19  0700   Weight: 60.9 kg (134 lb 4.8 oz) 58.6 kg (129 lb 3.2 oz) 57.3 kg (126 lb 6.4 oz)       Gen: NAD, pleasant  HEENT: No scleral icterus, Moist mucous membranes.   CV:  RRR, S1 S2 nl, no murmurs, rubs or gallops   Resp: CTAB, no crackles or wheezing   Abdomen: Soft, diffuse abdominal pain, normal active bowel sounds  Extremities: Trace peripheral edema, warm  Skin: sternotomy scar across torso   Neuro: asleep, grossly non-focal              Data:      Labs reviewed.         Medications     Medications    aspirin  81 mg Oral Daily     balsalazide  1,500 mg Oral BID     bumetanide  2 mg Intravenous BID     cefTAZidime  1 g Intravenous Q12H     DULoxetine  20 mg Oral Daily     fluticasone  1 spray Both Nostrils BID     levothyroxine  100 mcg Oral Daily     magnesium oxide  400 mg Oral BID     melatonin  6 mg Oral At Bedtime     montelukast  10 mg Oral At Bedtime     nystatin  500,000 Units Oral 4x Daily     omeprazole  40 mg Oral QPM     predniSONE  5 mg Oral BID w/meals     rosuvastatin  10 mg Oral Daily     tacrolimus  2.5 mg Oral QPM     tacrolimus  3 mg Oral QAM       ACE/ARB/ARNI NOT PRESCRIBED       BETA BLOCKER NOT PRESCRIBED

## 2019-05-06 ENCOUNTER — PRE VISIT (OUTPATIENT)
Dept: TRANSPLANT | Facility: CLINIC | Age: 54
End: 2019-05-06

## 2019-05-06 ENCOUNTER — PRE VISIT (OUTPATIENT)
Dept: CARDIOLOGY | Facility: CLINIC | Age: 54
End: 2019-05-06

## 2019-05-06 DIAGNOSIS — Z94.1 TRANSPLANTED HEART (H): Primary | ICD-10-CM

## 2019-05-06 LAB
ANION GAP SERPL CALCULATED.3IONS-SCNC: 10 MMOL/L (ref 3–14)
ANION GAP SERPL CALCULATED.3IONS-SCNC: 10 MMOL/L (ref 3–14)
ANISOCYTOSIS BLD QL SMEAR: SLIGHT
BACTERIA SPEC CULT: ABNORMAL
BACTERIA SPEC CULT: NO GROWTH
BACTERIA SPEC CULT: NO GROWTH
BASOPHILS # BLD AUTO: 0.1 10E9/L (ref 0–0.2)
BASOPHILS NFR BLD AUTO: 2 %
BUN SERPL-MCNC: 19 MG/DL (ref 7–30)
BUN SERPL-MCNC: 20 MG/DL (ref 7–30)
CALCIUM SERPL-MCNC: 8.3 MG/DL (ref 8.5–10.1)
CALCIUM SERPL-MCNC: 8.5 MG/DL (ref 8.5–10.1)
CHLORIDE SERPL-SCNC: 103 MMOL/L (ref 94–109)
CHLORIDE SERPL-SCNC: 105 MMOL/L (ref 94–109)
CMV DNA SPEC NAA+PROBE-ACNC: NORMAL [IU]/ML
CMV DNA SPEC NAA+PROBE-LOG#: NORMAL {LOG_IU}/ML
CO2 SERPL-SCNC: 23 MMOL/L (ref 20–32)
CO2 SERPL-SCNC: 23 MMOL/L (ref 20–32)
CREAT SERPL-MCNC: 1.42 MG/DL (ref 0.66–1.25)
CREAT SERPL-MCNC: 1.46 MG/DL (ref 0.66–1.25)
DIFFERENTIAL METHOD BLD: ABNORMAL
EOSINOPHIL # BLD AUTO: 0 10E9/L (ref 0–0.7)
EOSINOPHIL NFR BLD AUTO: 0 %
ERYTHROCYTE [DISTWIDTH] IN BLOOD BY AUTOMATED COUNT: 16.8 % (ref 10–15)
GFR SERPL CREATININE-BSD FRML MDRD: 53 ML/MIN/{1.73_M2}
GFR SERPL CREATININE-BSD FRML MDRD: 55 ML/MIN/{1.73_M2}
GLUCOSE SERPL-MCNC: 105 MG/DL (ref 70–99)
GLUCOSE SERPL-MCNC: 77 MG/DL (ref 70–99)
HCT VFR BLD AUTO: 27.2 % (ref 40–53)
HGB BLD-MCNC: 8.3 G/DL (ref 13.3–17.7)
INR PPP: 1.31 (ref 0.86–1.14)
LAB SCANNED RESULT: NORMAL
LYMPHOCYTES # BLD AUTO: 1.4 10E9/L (ref 0.8–5.3)
LYMPHOCYTES NFR BLD AUTO: 18.8 %
Lab: ABNORMAL
Lab: NORMAL
MAGNESIUM SERPL-MCNC: 2 MG/DL (ref 1.6–2.3)
MAGNESIUM SERPL-MCNC: 2.4 MG/DL (ref 1.6–2.3)
MCH RBC QN AUTO: 29.6 PG (ref 26.5–33)
MCHC RBC AUTO-ENTMCNC: 30.5 G/DL (ref 31.5–36.5)
MCV RBC AUTO: 97 FL (ref 78–100)
METAMYELOCYTES # BLD: 0.1 10E9/L
METAMYELOCYTES NFR BLD MANUAL: 2 %
MONOCYTES # BLD AUTO: 0.7 10E9/L (ref 0–1.3)
MONOCYTES NFR BLD AUTO: 9.9 %
MYELOCYTES # BLD: 0.1 10E9/L
MYELOCYTES NFR BLD MANUAL: 1 %
NEUTROPHILS # BLD AUTO: 4.8 10E9/L (ref 1.6–8.3)
NEUTROPHILS NFR BLD AUTO: 65.3 %
OVALOCYTES BLD QL SMEAR: SLIGHT
PLATELET # BLD AUTO: 323 10E9/L (ref 150–450)
PLATELET # BLD EST: ABNORMAL 10*3/UL
POIKILOCYTOSIS BLD QL SMEAR: SLIGHT
POTASSIUM SERPL-SCNC: 3.4 MMOL/L (ref 3.4–5.3)
POTASSIUM SERPL-SCNC: 3.6 MMOL/L (ref 3.4–5.3)
PROMYELOCYTES # BLD MANUAL: 0.1 10E9/L
PROMYELOCYTES NFR BLD MANUAL: 1 %
RBC # BLD AUTO: 2.8 10E12/L (ref 4.4–5.9)
SODIUM SERPL-SCNC: 135 MMOL/L (ref 133–144)
SODIUM SERPL-SCNC: 138 MMOL/L (ref 133–144)
SPECIMEN SOURCE: ABNORMAL
SPECIMEN SOURCE: NORMAL
WBC # BLD AUTO: 7.4 10E9/L (ref 4–11)

## 2019-05-06 PROCEDURE — 85025 COMPLETE CBC W/AUTO DIFF WBC: CPT | Performed by: INTERNAL MEDICINE

## 2019-05-06 PROCEDURE — 25000132 ZZH RX MED GY IP 250 OP 250 PS 637: Performed by: INTERNAL MEDICINE

## 2019-05-06 PROCEDURE — 36415 COLL VENOUS BLD VENIPUNCTURE: CPT | Performed by: INTERNAL MEDICINE

## 2019-05-06 PROCEDURE — 25000132 ZZH RX MED GY IP 250 OP 250 PS 637: Performed by: STUDENT IN AN ORGANIZED HEALTH CARE EDUCATION/TRAINING PROGRAM

## 2019-05-06 PROCEDURE — 80048 BASIC METABOLIC PNL TOTAL CA: CPT | Performed by: STUDENT IN AN ORGANIZED HEALTH CARE EDUCATION/TRAINING PROGRAM

## 2019-05-06 PROCEDURE — 21400000 ZZH R&B CCU UMMC

## 2019-05-06 PROCEDURE — 25000131 ZZH RX MED GY IP 250 OP 636 PS 637: Performed by: INTERNAL MEDICINE

## 2019-05-06 PROCEDURE — 85610 PROTHROMBIN TIME: CPT | Performed by: INTERNAL MEDICINE

## 2019-05-06 PROCEDURE — 83735 ASSAY OF MAGNESIUM: CPT | Performed by: STUDENT IN AN ORGANIZED HEALTH CARE EDUCATION/TRAINING PROGRAM

## 2019-05-06 PROCEDURE — 99232 SBSQ HOSP IP/OBS MODERATE 35: CPT | Mod: GC | Performed by: INTERNAL MEDICINE

## 2019-05-06 PROCEDURE — 36415 COLL VENOUS BLD VENIPUNCTURE: CPT | Performed by: STUDENT IN AN ORGANIZED HEALTH CARE EDUCATION/TRAINING PROGRAM

## 2019-05-06 PROCEDURE — 80048 BASIC METABOLIC PNL TOTAL CA: CPT | Performed by: INTERNAL MEDICINE

## 2019-05-06 PROCEDURE — 25000128 H RX IP 250 OP 636: Performed by: STUDENT IN AN ORGANIZED HEALTH CARE EDUCATION/TRAINING PROGRAM

## 2019-05-06 PROCEDURE — 25000128 H RX IP 250 OP 636: Performed by: INTERNAL MEDICINE

## 2019-05-06 PROCEDURE — 93010 ELECTROCARDIOGRAM REPORT: CPT | Performed by: INTERNAL MEDICINE

## 2019-05-06 PROCEDURE — 83735 ASSAY OF MAGNESIUM: CPT | Performed by: INTERNAL MEDICINE

## 2019-05-06 PROCEDURE — 93005 ELECTROCARDIOGRAM TRACING: CPT

## 2019-05-06 PROCEDURE — 25000131 ZZH RX MED GY IP 250 OP 636 PS 637: Performed by: STUDENT IN AN ORGANIZED HEALTH CARE EDUCATION/TRAINING PROGRAM

## 2019-05-06 RX ORDER — MYCOPHENOLATE MOFETIL 250 MG/1
1000 CAPSULE ORAL 2 TIMES DAILY
Status: DISCONTINUED | OUTPATIENT
Start: 2019-05-06 | End: 2019-05-08 | Stop reason: HOSPADM

## 2019-05-06 RX ORDER — MYCOPHENOLATE MOFETIL 250 MG/1
1000 CAPSULE ORAL 2 TIMES DAILY
Status: DISCONTINUED | OUTPATIENT
Start: 2019-05-06 | End: 2019-05-06

## 2019-05-06 RX ORDER — SULFAMETHOXAZOLE/TRIMETHOPRIM 800-160 MG
1 TABLET ORAL
Status: DISCONTINUED | OUTPATIENT
Start: 2019-05-06 | End: 2019-05-08 | Stop reason: HOSPADM

## 2019-05-06 RX ORDER — VALGANCICLOVIR 450 MG/1
450 TABLET, FILM COATED ORAL DAILY
Status: DISCONTINUED | OUTPATIENT
Start: 2019-05-06 | End: 2019-05-07

## 2019-05-06 RX ORDER — LIDOCAINE 40 MG/G
CREAM TOPICAL
Status: CANCELLED | OUTPATIENT
Start: 2019-05-06

## 2019-05-06 RX ADMIN — MYCOPHENOLATE MOFETIL 1000 MG: 250 CAPSULE ORAL at 19:38

## 2019-05-06 RX ADMIN — ACETAMINOPHEN 650 MG: 325 TABLET, FILM COATED ORAL at 08:06

## 2019-05-06 RX ADMIN — ALBUTEROL SULFATE 2 PUFF: 90 AEROSOL, METERED RESPIRATORY (INHALATION) at 17:51

## 2019-05-06 RX ADMIN — ACETAMINOPHEN 650 MG: 325 TABLET, FILM COATED ORAL at 15:53

## 2019-05-06 RX ADMIN — NYSTATIN 500000 UNITS: 100000 SUSPENSION ORAL at 19:38

## 2019-05-06 RX ADMIN — MELATONIN TAB 3 MG 6 MG: 3 TAB at 21:13

## 2019-05-06 RX ADMIN — BUMETANIDE 2 MG: 1 TABLET ORAL at 15:43

## 2019-05-06 RX ADMIN — NYSTATIN 500000 UNITS: 100000 SUSPENSION ORAL at 11:47

## 2019-05-06 RX ADMIN — BALSALAZIDE DISODIUM 1500 MG: 750 CAPSULE ORAL at 19:38

## 2019-05-06 RX ADMIN — DULOXETINE 20 MG: 20 CAPSULE, DELAYED RELEASE ORAL at 08:08

## 2019-05-06 RX ADMIN — BALSALAZIDE DISODIUM 1500 MG: 750 CAPSULE ORAL at 08:07

## 2019-05-06 RX ADMIN — ROSUVASTATIN CALCIUM 10 MG: 10 TABLET, FILM COATED ORAL at 08:06

## 2019-05-06 RX ADMIN — SULFAMETHOXAZOLE AND TRIMETHOPRIM 1 TABLET: 800; 160 TABLET ORAL at 18:51

## 2019-05-06 RX ADMIN — PREDNISONE 5 MG: 5 TABLET ORAL at 08:07

## 2019-05-06 RX ADMIN — CEFTAZIDIME 1 G: 1 INJECTION, POWDER, FOR SOLUTION INTRAMUSCULAR; INTRAVENOUS at 11:57

## 2019-05-06 RX ADMIN — MAGNESIUM OXIDE TAB 400 MG (241.3 MG ELEMENTAL MG) 400 MG: 400 (241.3 MG) TAB at 19:38

## 2019-05-06 RX ADMIN — MYCOPHENOLATE MOFETIL 1000 MG: 250 CAPSULE ORAL at 11:47

## 2019-05-06 RX ADMIN — BUMETANIDE 2 MG: 1 TABLET ORAL at 08:06

## 2019-05-06 RX ADMIN — ACETAMINOPHEN 650 MG: 325 TABLET, FILM COATED ORAL at 21:13

## 2019-05-06 RX ADMIN — PREDNISONE 5 MG: 5 TABLET ORAL at 17:51

## 2019-05-06 RX ADMIN — NYSTATIN 500000 UNITS: 100000 SUSPENSION ORAL at 08:08

## 2019-05-06 RX ADMIN — ACETAMINOPHEN 650 MG: 325 TABLET, FILM COATED ORAL at 11:57

## 2019-05-06 RX ADMIN — TACROLIMUS 3 MG: 1 CAPSULE ORAL at 08:07

## 2019-05-06 RX ADMIN — CEFEPIME 2 G: 2 INJECTION, POWDER, FOR SOLUTION INTRAVENOUS at 17:52

## 2019-05-06 RX ADMIN — FLUTICASONE PROPIONATE 1 SPRAY: 50 SPRAY, METERED NASAL at 08:10

## 2019-05-06 RX ADMIN — OMEPRAZOLE 40 MG: 20 CAPSULE, DELAYED RELEASE ORAL at 19:38

## 2019-05-06 RX ADMIN — MAGNESIUM OXIDE TAB 400 MG (241.3 MG ELEMENTAL MG) 400 MG: 400 (241.3 MG) TAB at 08:08

## 2019-05-06 RX ADMIN — VALGANCICLOVIR 450 MG: 450 TABLET, FILM COATED ORAL at 10:51

## 2019-05-06 RX ADMIN — NYSTATIN 500000 UNITS: 100000 SUSPENSION ORAL at 15:43

## 2019-05-06 RX ADMIN — MONTELUKAST SODIUM 10 MG: 10 TABLET, COATED ORAL at 21:13

## 2019-05-06 RX ADMIN — CEFTAZIDIME 1 G: 1 INJECTION, POWDER, FOR SOLUTION INTRAMUSCULAR; INTRAVENOUS at 00:53

## 2019-05-06 RX ADMIN — ASPIRIN 81 MG CHEWABLE TABLET 81 MG: 81 TABLET CHEWABLE at 08:07

## 2019-05-06 RX ADMIN — LEVOTHYROXINE SODIUM 100 MCG: 25 TABLET ORAL at 08:07

## 2019-05-06 RX ADMIN — POTASSIUM CHLORIDE 20 MEQ: 750 TABLET, EXTENDED RELEASE ORAL at 10:51

## 2019-05-06 RX ADMIN — TACROLIMUS 2.5 MG: 1 CAPSULE ORAL at 17:51

## 2019-05-06 ASSESSMENT — ACTIVITIES OF DAILY LIVING (ADL)
ADLS_ACUITY_SCORE: 14

## 2019-05-06 ASSESSMENT — MIFFLIN-ST. JEOR: SCORE: 1372.4

## 2019-05-06 NOTE — PROGRESS NOTES
[facility]    Transplant Infectious Diseases Inpatient Progress note      Everton Larios MRN# [id number]   YOB: 1965 Age: 54 year old   Date of Admission: 4/29/2019  8:36 AM  Transplant: 2/24/2019 (Heart), Postoperative day: 71            Recommendations:   1. I would switch ceftazidime to cefepime 2 grams q12 hr.   - duration is 4 weeks.   2. Please check 12 lead EKG since the patient's QTc is between 477 and 512, and since we my have to add cipro or levaquin.   3. Please start isavuconazole  mg PO TID for 2 days then 372 daily till he follows up with ID as OP and till the BAL is finalized negative for fungal cx and till follow up CT is done.   - please check with  if isavuconazole is covered, otherwise will have to use voriconazole, posaconazole, or IV micafungin.   4. Will need follow up CT in 4 weeks.   5. Will need follow up with ID in 4 weeks.   6. Would discontinue VGCV and monitor CMV PCR weekly.   7. Would resume bactrim DS three times a week.         Summary of Presentation:   Transplants:  2/24/2019 (Heart), Postoperative day:  71     This patient is a 54 year old male congenital heart disease s/p OHT in 2/2019 with methylprednisolone induction and TAC/MMF/prednisone for maintenance.   Presented with few days of dyspnea, cough, and subjective fever.   Found to have pseudomonal BSI and pneumonia.         Active Problems and Infectious Diseases Issues:   1. P aeruginosa BSI 4/29/2019, negative since 4/30/2019.   2. P aeruginosa HCAP.   3. Abnormal CT chest.   4. Febrile neutropenia.  5. QTc prolongation and risk for adverse events.      This patient likely has P aeruginosa pneumonia that resulted in transient P aeruginosa bacteremia.   Though the CT scan findings can be explained by P aeruginosa pneumonia, the pulmonary nodules with GGO, positive BD glucan, and the neutropenia are very concerning for fungal infection specifically  aspergillosis.     I feel that we need to be aggressive treating this immunocompromised patient's pseudomonal infection with IV and PO drugs (cefepime/cipro) if the repeat EKG is with normal QTc. If the QTc is still prolonged then will have to use IV cefepime.     I feel with the history of neutropenia and the CT findings as well as the positive BD glucan, I am obligated to treat empirically for invasive fungal disease. I would choose isavuconazole given the prolonged QTc and the potential need to use fluoroquinolones.     The neutropenia is likely to be due to drugs.   The most likely culprit drugs are MMF/TAC/VGCV/bactrim.   MMF and TAC are important in this OHT recipients. Since the patient is CMV +/+ we can substitute the preemptive measure for the universal CMV ppx, so I would discontinue VGCV and monitor CMV weekly. Bactrim three times a day is unlikely to result in bone marrow suppression, and I would resume it.   Neutropenia now resolved after 2 doses of G-CSF 5/1/2019 and 5/2/2019.    Other Infectious Disease issues include:  -   - PCP prophylaxis: off of bactrim since 4/12/2019.   - Serostatus: CMV D+/R+, EBV D+/R+, HSV1+/2-, VZV + off and on VGCV for neutropenia.   - Immunization status: Too early to discuss.   - Gamma globulin status: 358 5/1/2019.     Attestation:  I interviewed the patient and obtained history from the patient, and by reviewing the patient's chart including outside records, microbiological data, and radiological data. All data are summarized in this notes.  Ric Shukla   Pager: [phone]  05/06/2019      Interim History and Events:   The patient is still with chest tightness, feels that he needs a nebulizing therapy. Still with mild cough. No fever or chills.       ROS:  As mentioned in the interim history otherwise negative by reviewing constitutional symptoms, central and peripheral neurological systems, respiratory system, cardiac system, GI system,  system, musculoskeletal,  skin, allergy, and lymphatics.                 Pysical Examination:  Temp: 98.4  F (36.9  C) Temp src: Oral BP: 121/85 Pulse: 100 Heart Rate: 101 Resp: 18 SpO2: 98 % O2 Device: None (Room air)    Vitals:    05/02/19 0800 05/03/19 0739 05/04/19 0646 05/05/19 0700   Weight: 61.3 kg (135 lb 3.2 oz) 60.9 kg (134 lb 4.8 oz) 58.6 kg (129 lb 3.2 oz) 57.3 kg (126 lb 6.4 oz)    05/06/19 0400   Weight: 56.8 kg (125 lb 3.2 oz)   Constitutional: awake, alert, cooperative, no apparent distress and appears at stated age, well nourished.   Neurologic: Patient is moving all extremities without focal deficit, no focal sensory loss.   Lungs: CTA bilaterally except for mild generalized wheezing, no accessory muscle use, no dullness to percussion and no abnormal tactile fremitus.   CVS: tachycardic, normal S1/S2, no murmur, PMI was not displaced.   Abdomen: non-tender, non-distended, no masses, no bruit, no shifting dullness, normal BS.   Extremities: no pitting edema of bilateral lower extremities, no ulcers, normal ROM of all joints, no swelling or erythema of any of joints and no tenderness to palpation.   Skin: no induration, fluctuation or discharge, and no rash       Medications:  Medications that Require Transfusion:     ACE/ARB/ARNI NOT PRESCRIBED       BETA BLOCKER NOT PRESCRIBED     Scheduled Medications:     aspirin  81 mg Oral Daily     balsalazide  1,500 mg Oral BID     bumetanide  2 mg Oral BID     cefTAZidime  1 g Intravenous Q12H     DULoxetine  20 mg Oral Daily     fluticasone  1 spray Both Nostrils BID     levothyroxine  100 mcg Oral Daily     magnesium oxide  400 mg Oral BID     melatonin  6 mg Oral At Bedtime     montelukast  10 mg Oral At Bedtime     mycophenolate  1,000 mg Oral BID     nystatin  500,000 Units Oral 4x Daily     omeprazole  40 mg Oral QPM     predniSONE  5 mg Oral BID w/meals     rosuvastatin  10 mg Oral Daily     tacrolimus  2.5 mg Oral QPM     tacrolimus  3 mg Oral QAM     valGANciclovir  450 mg  Oral Daily       Laboratory Data:   No results found for: ACD4    Inflammatory Markers    Recent Labs   Lab Test 05/02/19  0536 04/29/19  0911 11/15/18  0955 10/22/18  1239   SED 37*  --   --   --    CRP 69.0* 120.0* 6.1 12.5       Immune Globulin Studies     Recent Labs   Lab Test 05/01/19  1850   *   IGM 29*   IGE 38          Metabolic Studies       Recent Labs   Lab Test 05/06/19  0438 05/05/19  1635 05/05/19  0720 05/04/19  1738 05/04/19  0545 05/03/19  1658  04/29/19  0918 04/29/19  0911 04/26/19  0848 04/24/19  0903  02/23/19  1835    135 137 135 138 134   < >  --  131* 138 137   < > 133   POTASSIUM 3.4 4.2 3.7 3.8 4.1 3.8   < >  --  3.5 3.8 5.2   < > 3.6   CHLORIDE 105 104 106 104 106 103   < >  --  94 104 105   < > 93*   CO2 23 23 21 22 20 20   < >  --  24 26 20   < > 30   ANIONGAP 10 8 10 10 12 11   < >  --  12 9 11   < > 10   BUN 20 21 21 22 22 24   < >  --  23 47* 51*   < > 28   CR 1.46* 1.68* 1.64* 1.73* 1.81* 1.90*   < >  --  1.04 1.80* 1.70*   < > 1.03   GFRESTIMATED 53* 45* 46* 43* 41* 39*   < >  --  80 41* 44*   < > 81   GLC 77 78 70 116* 94 95   < >  --  79 134* 83   < > 114*   A1C  --   --   --   --   --   --   --   --   --   --   --   --  5.7*   RADAMES 8.5 8.6 8.8 8.4* 8.9 8.7   < >  --  9.0 8.7 8.7   < > 8.9   PHOS  --   --   --   --   --   --   --   --   --  3.6 4.1   < > 3.6   MAG 2.4* 2.0 2.1 1.6 2.1 2.1   < >  --   --  2.2 1.5*   < > 2.0   LACT  --   --   --   --   --   --   --  1.7 1.6  --   --    < >  --     < > = values in this interval not displayed.       Hepatic Studies    Recent Labs   Lab Test 04/29/19  0911 04/04/19  0840 04/01/19  0653 03/26/19  0531 03/18/19  0505 03/15/19  0409 03/14/19  1653  03/10/19  0410   BILITOTAL 1.0 0.6 0.6 0.5 0.8 0.8  --    < > 0.8   ALKPHOS 125 139 135 110 137 88  --    < > 104   ALBUMIN 3.5 2.9* 2.8* 2.4* 2.6* 2.3*  --    < > 2.8*   AST 10 16 14 16 36 20  --    < > 22   ALT 17 26 28 23 57 24  --    < > 19   LDH  --   --   --   --   --    --  320*  --  276*    < > = values in this interval not displayed.       Pancreatitis testing    Recent Labs   Lab Test 04/29/19  0911 03/18/19  0505 02/23/19  1835 11/15/18  0955   AMYLASE  --  51 22*  --    LIPASE 42* 237  --   --    TRIG  --   --   --  112       Hematology Studies      Recent Labs   Lab Test 05/06/19  0438 05/05/19  0720 05/04/19  0545 05/03/19  0435 05/02/19  0536 05/01/19  0515   WBC 7.4 7.6 6.1 4.1 1.7* 0.6*   ANEU 4.8 4.8 3.9 1.6 0.6* 0.2*   ALYM 1.4 1.6 0.6* 0.9 0.5* 0.2*   GRACE 0.7 0.8 1.2 1.3 0.4 0.1   AEOS 0.0 0.1 0.0 0.0 0.1 0.0   HGB 8.3* 8.0* 8.5* 8.0* 8.1* 8.0*   HCT 27.2* 26.6* 28.0* 26.4* 26.3* 26.3*    299 240 220 189 167       Arterial Blood Gas Testing    Recent Labs   Lab Test 03/11/19  1152 03/10/19  1214 03/09/19  0400 03/08/19  2147  02/25/19  1717 02/25/19  1502 02/25/19  0918  02/25/19  0350 02/25/19  0110   PH  --   --   --   --   --  7.32* 7.31* 7.33*  --  7.39 7.35   PCO2  --   --   --   --   --  36 51* 46*  --  42 42   PO2  --   --   --   --   --  110* 75* 125*  --  87 170*   HCO3  --   --   --   --   --  19* 26 24  --  25 23   O2PER 21 21 21 21.0   < > 3L 2  2 40.0  40.0   < > 40.0  40.0 60.0    < > = values in this interval not displayed.        Urine Studies     Recent Labs   Lab Test 04/29/19  1031 03/26/19  1707 03/17/19  0045 03/04/19  1303 02/23/19  1921   URINEPH 6.0 5.5 5.5 5.0 6.0   NITRITE Negative Negative Negative Negative Negative   LEUKEST Negative Negative Negative Negative Negative   WBCU <1 2 15* 0 <1       Vancomycin Levels     Recent Labs   Lab Test 05/02/19  1519   VANCOMYCIN 10.0       Tacrolimus levels    Invalid input(s): TACROLIMUS, TAC, TACR  Transplant Immunosuppression Labs Latest Ref Rng & Units 5/6/2019 5/5/2019 5/5/2019 5/4/2019 5/4/2019   Tacro Level 5.0 - 15.0 ug/L - - 9.2 - -   Tacro Level - - - Not Provided - -   Creat 0.66 - 1.25 mg/dL 1.46(H) 1.68(H) 1.64(H) 1.73(H) 1.81(H)   BUN 7 - 30 mg/dL 20 21 21 22 22   WBC 4.0 -  11.0 10e9/L 7.4 - 7.6 - 6.1   Neutrophil % 65.3 - 62.6 - 64.0   ANEU 1.6 - 8.3 10e9/L 4.8 - 4.8 - 3.9         Microbiology:  Blood culture  4/29/2019 P aeruginosa     4/30/2019 onward negative.     Last check of C difficile  C Diff Toxin B PCR   Date Value Ref Range Status   04/29/2019 Negative NEG^Negative Final     Comment:     Negative: Clostridium difficile target DNA sequences NOT detected, presumed   negative for Clostridium difficile toxin B or the number of bacteria present   may be below the limit of detection for the test.  FDA approved assay performed using [employer] GeneXpert real-time PCR.  A negative result does not exclude actual disease due to Clostridium difficile   and may be due to improper collection, handling and storage of the specimen   or the number of organisms in the specimen is below the detection limit of the   assay.         Virology:  CMV viral loads    Recent Labs   Lab Test 05/06/19  0438 04/30/19  1111   CSPEC Plasma, EDTA anticoagulant Bronchial   CMVLOG Not Calculated Not Calculated       CMV viral loads    Log IU/mL of CMVQNT   Date Value Ref Range Status   05/06/2019 Not Calculated <2.1 [Log_IU]/mL Final   04/30/2019 Not Calculated <2.1 [Log_IU]/mL Final   04/29/2019 Not Calculated <2.1 [Log_IU]/mL Final   04/26/2019 Not Calculated <2.1 [Log_IU]/mL Final   04/24/2019 Not Calculated <2.1 [Log_IU]/mL Final   04/17/2019 Not Calculated <2.1 [Log_IU]/mL Final   04/08/2019 Not Calculated <2.1 [Log_IU]/mL Final   04/01/2019 Not Calculated <2.1 [Log_IU]/mL Final   03/25/2019 Not Calculated <2.1 [Log_IU]/mL Final   03/18/2019 Not Calculated <2.1 [Log_IU]/mL Final       CMV resistance testing  No lab results found.  No results found for: CMVCID, CMVFOS, CMVGAN     No results found for: H6RES    EBV DNA Copies/mL   Date Value Ref Range Status   04/29/2019 EBV DNA Not Detected EBVNEG^EBV DNA Not Detected [Copies]/mL Final   04/26/2019 EBV DNA Not Detected EBVNEG^EBV DNA Not Detected  [Copies]/mL Final   03/27/2019 EBV DNA Not Detected EBVNEG^EBV DNA Not Detected [Copies]/mL Final         Imaging:  CT c/a/p 4/29/2019 reviewed by myself   IMPRESSION:   In this patient with history of heart transplantation,  1. New diffuse bilateral solid with peripheral groundglass pulmonary  nodules/opacities and  mediastinal adenopathy. Constellation of  findings are highly suspicious for infection (typical or atypical  infection) in this immune suppressed patient.  2. New small ascites.  3 Nonspecific small area of poorly defined heterogeneous posterior  right hepatic parenchyma. This may be secondary to the hepatic  fibrosis. Attention on follow-up examination.  4. New compression deformity of T12 vertebral body, since 3/19/2019.        Ric Shukla  Pager: [phone]

## 2019-05-06 NOTE — PROGRESS NOTES
Cardiology Progress Note  Everton Larios MRN: [id number]  Age: 54 year old, : 1965  05/06/19    I personally saw and examined this patient, reviewed imaging and laboratory studies, confirmed physical examination and discussed results and plan with patient and or family.      Changes Today:     - Continue Ceftazidime   - Follow up with ID   - Bumex 2mg PO BID  - restart valcyclovir  - restart MMF at 1000 BID     - Will need weekly CMV checked (next due , ordered)  - Will need routine heart biopsy this week (~)  - Check DSA this week (ordered for )        Assessment and Plan:     Everton Larios is a 54 year old male with a history of NICM s/p OHT (2019), paroxysmal atrial fibrillation, recent T12 fracture (2019), ulcerative colitis, Pierce's esophagus, hypothyroidism, GERD, BPH and depression who is admitted with Pseudomonas bacteremia, new T12 fracture and multiple new pulmonary groundglass nodules.      #. Pseudomonas bacteremia  Blood cx from  shows pseudomonas bacteremia. Etiology likely pulmonary (possible pseudomonas pneumonia) with CT Chest on  revealing new diffuse bilateral solid with peripheral groundglass pulmonary nodules/opacities and  mediastinal adenopathy.   - Transplant ID following; appreciate assistance   - Repeat blood cx NGTD  - Antibiotics:              - Ceftazidime  -                               Duration to be reassessed 2019               - Cefepime -               - Zosyn -              - One time tobramycin 240mg IV                - Azithromycin -                             - Vancomycin -,      #. Pulmonary groundglass nodules  Notable productive cough of yellow sputum over the past few days, chest discomfort and sinus congestion noted. CXR with new nodular opacity overlying the right upper lobe. CT chest shows new diffuse bilateral solid with peripheral groundglass  pulmonary nodules/opacities and  mediastinal adenopathy. Also concern for possible fungal infection.   - Pulmonology consulted; appreciate assistance   - Bronchoscopy cultures obtained 4/30/2019: growing pseudomonas   - Transplant ID consulted; appreciate assistance  - Aspergillus galactomannan Ag pending (4/30)  - B-D Glucan positive from 4/30, did not start anti-fungal due to unclear infectious source for the positive B-D glucan and in setting of prolonged QTc  - Histoplasma U Ag pending  - IgG 106, IgM 29, IgA 106 from 5/1   - Influenza A/B, RSV PCR negative   - Tessalon capsules prn   - Albuterol prn, DuoNebs scheduled      #. Neutropenia   Afebrile and tachycardic to 120s on admission. Lactate wnl.  initially. UA unremarkable. Patient has noted subjective fevers at home in additional to upper respiratory tract symptoms, diffuse abdominal pain, ~5 episodes of non-bloody diarrhea in past 24 hours with stool softener use. Neutropenia likely in the setting of acute infection from bacteremia of respiratory (bacterial, fungal) or GI etiology (EBV, CMV, URI), medication-induced (bactrim, cellcept and valcyte). Hematological or rheumatological etiologies less likely. C.diff negative.   - Neupogen dose 5/1, 5/2  - Neutropenic precautions   - Transplant ID consulted; appreciate recommendations   - Held home cellcept, valcyte and bactrim - restarting cellcept and valcyte today  - CMV and EBV PCR negative 4/29, next check 3/7, ordered  - Antibiotics: see above     #. Abdominal pain  #. Non-bloody diarrhea   Lactate wnl. Pain predominantly epigastric, RUQ and LLQ on exam. Lipase wnl. Considered CMV colitis, C.diff, EBV, UC flare, typhilitis contributing to diarrhea and abdominal pain. CT A/P shows nonspecific small area of poorly defined heterogeneous posterior right hepatic parenchyma possibly secondary to the hepatic fibrosis.   - C. Diff and enteric panel negative   - CMV and EBV PCR 4/29 negative     #. NICM s/p  OHT (2/24/2019) with RV dysfunction  Most recent biopsy 4/24/2019 with 1 R mild focal acute cellular rejection with no AMR. Bactrim and Valcyte held since 4/26/2019 due to leukopenia. Serostatus: CMV+, EBV+, HSV1+, HSV2 neg, VZV+.   - Immunosuppresives:                - Held home cellcept               - Continue tacrolimus 3mg qAM and 2.5mg qPM; goal of 8 - 10                                5/5 - tacro level 9.2                - Continue prednisone 5mg BID   - PPx: held valcyte and bactrim in setting of neutropenia. Continue nystatin.   - Diuretics: bumex 2mg IV BID, transitioned to PO bumex 2mg BID today   - ASA 81mg daily  - Rosuvastatin 10mg daily   - Weekly CMV ordered  - Repeat heart biopsy on 5/8/2019  - Check DSA  (ordered for 5/8)      #. Ulcerative colitis: continue home balsalazide 1500mg BID  #. Hypothyroidism: continue levothyroxine   #. GERD and history of Pierce's esophagus: continue omeprazole   #. Depression: continue duloxetine   #. Seasonal allergies: azelastine 0.1% nasal spray, fluticasone, montelukast   #. Insomnia: continue melatonin     Diet/IVF: cardiac diet, NO IVF  DVT ppx: Ambulate  Disposition/Admission Status: admitted for further pseudomonas bacteremia   CODE: Full Code     Patient was discussed with staff attending, Dr. Beck.     Peri Miller [id number]  p 1955             Subjective     No acute events overnight. Feels short of breath with ambulation still. Not at rest. Denies HA, chest pain, nausea, vomiting, diarrhea, LE swelling. Is eating and drinking well.           Objective     Vitals:  Temp:  [98.2  F (36.8  C)-98.8  F (37.1  C)] 98.2  F (36.8  C)  Pulse:  [93] 93  Heart Rate:  [] 93  Resp:  [16-20] 20  BP: (119-139)/(86-93) 125/86  SpO2:  [98 %-100 %] 98 %  MAP: 90-100s     I/O: Net -880mL (1650mL UOP) over last 24h     Vitals:    05/04/19 0646 05/05/19 0700 05/06/19 0400   Weight: 58.6 kg (129 lb 3.2 oz) 57.3 kg (126 lb 6.4 oz) 56.8 kg (125 lb 3.2 oz)       Gen: NAD,  pleasant  HEENT: No scleral icterus, Moist mucous membranes.   CV:  RRR, S1 S2 nl, no murmurs, rubs or gallops   Resp: Normal effort. Faint expiratory wheeze in right upper lobe posteriorly. No other wheezing or crackles noted.   Abdomen: Soft, NT, ND. + Bowel sounds.   Extremities: Trace peripheral edema, warm  Skin: sternotomy scar across torso   Neuro: asleep, grossly non-focal              Data:      Labs reviewed.         Medications     Medications    aspirin  81 mg Oral Daily     balsalazide  1,500 mg Oral BID     bumetanide  2 mg Oral BID     cefTAZidime  1 g Intravenous Q12H     DULoxetine  20 mg Oral Daily     fluticasone  1 spray Both Nostrils BID     levothyroxine  100 mcg Oral Daily     magnesium oxide  400 mg Oral BID     melatonin  6 mg Oral At Bedtime     montelukast  10 mg Oral At Bedtime     nystatin  500,000 Units Oral 4x Daily     omeprazole  40 mg Oral QPM     predniSONE  5 mg Oral BID w/meals     rosuvastatin  10 mg Oral Daily     tacrolimus  2.5 mg Oral QPM     tacrolimus  3 mg Oral QAM       ACE/ARB/ARNI NOT PRESCRIBED       BETA BLOCKER NOT PRESCRIBED

## 2019-05-06 NOTE — PROGRESS NOTES
VSS except slightly tachy HR 97,RA 98%,c/o mild lower back pain ,declined pain medication.Pt stated his L1 is fractured not T12 .up independently voided adequately,Bumex scheduled at 8 PM given per pt.'s request at about 630 PM.LS clear diminished ,pt continues to have frequent productive cough. Denies shortness of breath.will continue to monitor.

## 2019-05-06 NOTE — PROGRESS NOTES
CLINICAL NUTRITION SERVICES    Reviewed nutrition risk factors due to LOS. Pt is tolerating diet, eating well per nursing documentation (100% meals per RN flowsheets). Most recent wt fairly stable compared to admit wt (54.4 kg on 4/29-->56.8 kg on 5/6). No nutrition issues identified at this time. RD will follow via rounds at this time, unless consulted.    Belinda Mandujano RD, LD  Pager: [phone]

## 2019-05-06 NOTE — SIGNIFICANT EVENT
Was called by nurse that patient had EKG which showed STEMI.    On review of EKG, patient has a RBBB which is not new. There is some concave subtle ST elevation in the inferior leads and V1. However, these do not appear to be different than previous EKG. Given that patient is not having symptoms, I would not call this a STEMI.     James Novak   Cardiology Fellow   Pager: [phone]

## 2019-05-06 NOTE — PLAN OF CARE
D:  Pt admitted 4/29 with Pseudomonas bacteremia.  History of NICM s/p OHT 2/24/19, paroxysmal atrial fibrillation, recent T12 fracture (4/21/2019), ulcerative colitis, Pierce's esophagus, hypothyroidism, GERD, BPH and depression  I/A:  VSS on room air, SR on monitor.  Up independently w/ cane.  IV abx as scheduled.  K replaced per protocol.  PRN tolu for wheezing, PRN tylenol for low back pain w/ partial relief, pt declined other interventions  P:  Continue w/ plan of care, notify team w/ changes/concerns

## 2019-05-06 NOTE — PLAN OF CARE
Shift: [id number]  Neuro: A&Ox4, neuros intact.   Cardiac: Afebrile, VSS. Denies CP.   Respiratory: sating 98% on RA, denies SOB.   GI/: Voiding spontaneously into urinal. No BM this shift.   Diet/appetite: Tolerating regular diet. Denies nausea.   Activity: Up independently.  Pain: C/o back pain, declined medication  Skin: Generalized bruising throughout, otherwise skin intact, no new deficits noted.   Lines: PIV SL'd.   Drains:None.   Replacement: None.     Pt resting in between nursing care. Calls appropriately and makes needs known. Will continue to monitor and follow POC.

## 2019-05-07 ENCOUNTER — AMBULATORY - HEALTHEAST (OUTPATIENT)
Dept: PHARMACY | Facility: HOSPITAL | Age: 54
End: 2019-05-07

## 2019-05-07 ENCOUNTER — APPOINTMENT (OUTPATIENT)
Dept: OCCUPATIONAL THERAPY | Facility: CLINIC | Age: 54
DRG: 853 | End: 2019-05-07
Payer: COMMERCIAL

## 2019-05-07 DIAGNOSIS — A41.52 SEPSIS DUE TO PSEUDOMONAS AERUGINOSA (H): ICD-10-CM

## 2019-05-07 DIAGNOSIS — R91.8 PULMONARY NODULES: ICD-10-CM

## 2019-05-07 LAB
ANION GAP SERPL CALCULATED.3IONS-SCNC: 11 MMOL/L (ref 3–14)
ANION GAP SERPL CALCULATED.3IONS-SCNC: 9 MMOL/L (ref 3–14)
ANISOCYTOSIS BLD QL SMEAR: SLIGHT
BACTERIA SPEC CULT: NO GROWTH
BACTERIA SPEC CULT: NO GROWTH
BASOPHILS # BLD AUTO: 0.1 10E9/L (ref 0–0.2)
BASOPHILS NFR BLD AUTO: 1.8 %
BUN SERPL-MCNC: 18 MG/DL (ref 7–30)
BUN SERPL-MCNC: 19 MG/DL (ref 7–30)
CALCIUM SERPL-MCNC: 8.4 MG/DL (ref 8.5–10.1)
CALCIUM SERPL-MCNC: 8.4 MG/DL (ref 8.5–10.1)
CHLORIDE SERPL-SCNC: 101 MMOL/L (ref 94–109)
CHLORIDE SERPL-SCNC: 105 MMOL/L (ref 94–109)
CO2 SERPL-SCNC: 24 MMOL/L (ref 20–32)
CO2 SERPL-SCNC: 25 MMOL/L (ref 20–32)
CREAT SERPL-MCNC: 1.42 MG/DL (ref 0.66–1.25)
CREAT SERPL-MCNC: 1.66 MG/DL (ref 0.66–1.25)
DIFFERENTIAL METHOD BLD: ABNORMAL
EOSINOPHIL # BLD AUTO: 0 10E9/L (ref 0–0.7)
EOSINOPHIL NFR BLD AUTO: 0 %
ERYTHROCYTE [DISTWIDTH] IN BLOOD BY AUTOMATED COUNT: 16.8 % (ref 10–15)
GFR SERPL CREATININE-BSD FRML MDRD: 45 ML/MIN/{1.73_M2}
GFR SERPL CREATININE-BSD FRML MDRD: 55 ML/MIN/{1.73_M2}
GLUCOSE SERPL-MCNC: 87 MG/DL (ref 70–99)
GLUCOSE SERPL-MCNC: 90 MG/DL (ref 70–99)
HCT VFR BLD AUTO: 28.1 % (ref 40–53)
HGB BLD-MCNC: 8.4 G/DL (ref 13.3–17.7)
INR PPP: 1.33 (ref 0.86–1.14)
INTERPRETATION ECG - MUSE: NORMAL
LYMPHOCYTES # BLD AUTO: 1.4 10E9/L (ref 0.8–5.3)
LYMPHOCYTES NFR BLD AUTO: 21.1 %
Lab: NORMAL
Lab: NORMAL
MAGNESIUM SERPL-MCNC: 1.7 MG/DL (ref 1.6–2.3)
MAGNESIUM SERPL-MCNC: 1.9 MG/DL (ref 1.6–2.3)
MCH RBC QN AUTO: 28.9 PG (ref 26.5–33)
MCHC RBC AUTO-ENTMCNC: 29.9 G/DL (ref 31.5–36.5)
MCV RBC AUTO: 97 FL (ref 78–100)
MONOCYTES # BLD AUTO: 0.9 10E9/L (ref 0–1.3)
MONOCYTES NFR BLD AUTO: 14 %
NEUTROPHILS # BLD AUTO: 4.2 10E9/L (ref 1.6–8.3)
NEUTROPHILS NFR BLD AUTO: 63.1 %
PLATELET # BLD AUTO: 336 10E9/L (ref 150–450)
PLATELET # BLD EST: ABNORMAL 10*3/UL
POIKILOCYTOSIS BLD QL SMEAR: SLIGHT
POTASSIUM SERPL-SCNC: 3.4 MMOL/L (ref 3.4–5.3)
POTASSIUM SERPL-SCNC: 4 MMOL/L (ref 3.4–5.3)
RBC # BLD AUTO: 2.91 10E12/L (ref 4.4–5.9)
RBC INCLUSIONS BLD: SLIGHT
SODIUM SERPL-SCNC: 137 MMOL/L (ref 133–144)
SODIUM SERPL-SCNC: 138 MMOL/L (ref 133–144)
SPECIMEN SOURCE: NORMAL
SPECIMEN SOURCE: NORMAL
WBC # BLD AUTO: 6.6 10E9/L (ref 4–11)

## 2019-05-07 PROCEDURE — 36415 COLL VENOUS BLD VENIPUNCTURE: CPT | Performed by: STUDENT IN AN ORGANIZED HEALTH CARE EDUCATION/TRAINING PROGRAM

## 2019-05-07 PROCEDURE — 80048 BASIC METABOLIC PNL TOTAL CA: CPT | Performed by: STUDENT IN AN ORGANIZED HEALTH CARE EDUCATION/TRAINING PROGRAM

## 2019-05-07 PROCEDURE — 80048 BASIC METABOLIC PNL TOTAL CA: CPT | Performed by: INTERNAL MEDICINE

## 2019-05-07 PROCEDURE — 25000128 H RX IP 250 OP 636: Performed by: INTERNAL MEDICINE

## 2019-05-07 PROCEDURE — 25000131 ZZH RX MED GY IP 250 OP 636 PS 637: Performed by: STUDENT IN AN ORGANIZED HEALTH CARE EDUCATION/TRAINING PROGRAM

## 2019-05-07 PROCEDURE — 85025 COMPLETE CBC W/AUTO DIFF WBC: CPT | Performed by: INTERNAL MEDICINE

## 2019-05-07 PROCEDURE — 25000132 ZZH RX MED GY IP 250 OP 250 PS 637: Performed by: STUDENT IN AN ORGANIZED HEALTH CARE EDUCATION/TRAINING PROGRAM

## 2019-05-07 PROCEDURE — 83735 ASSAY OF MAGNESIUM: CPT | Performed by: INTERNAL MEDICINE

## 2019-05-07 PROCEDURE — 36415 COLL VENOUS BLD VENIPUNCTURE: CPT | Performed by: INTERNAL MEDICINE

## 2019-05-07 PROCEDURE — 97535 SELF CARE MNGMENT TRAINING: CPT | Mod: GO

## 2019-05-07 PROCEDURE — 83735 ASSAY OF MAGNESIUM: CPT | Performed by: STUDENT IN AN ORGANIZED HEALTH CARE EDUCATION/TRAINING PROGRAM

## 2019-05-07 PROCEDURE — 25000131 ZZH RX MED GY IP 250 OP 636 PS 637: Performed by: INTERNAL MEDICINE

## 2019-05-07 PROCEDURE — 25800030 ZZH RX IP 258 OP 636: Performed by: INTERNAL MEDICINE

## 2019-05-07 PROCEDURE — 40000141 ZZH STATISTIC PERIPHERAL IV START W/O US GUIDANCE

## 2019-05-07 PROCEDURE — 85610 PROTHROMBIN TIME: CPT | Performed by: INTERNAL MEDICINE

## 2019-05-07 PROCEDURE — 21400000 ZZH R&B CCU UMMC

## 2019-05-07 PROCEDURE — 25000132 ZZH RX MED GY IP 250 OP 250 PS 637: Performed by: INTERNAL MEDICINE

## 2019-05-07 PROCEDURE — 97530 THERAPEUTIC ACTIVITIES: CPT | Mod: GO

## 2019-05-07 PROCEDURE — 25000125 ZZHC RX 250: Performed by: INTERNAL MEDICINE

## 2019-05-07 PROCEDURE — 99232 SBSQ HOSP IP/OBS MODERATE 35: CPT | Mod: GC | Performed by: INTERNAL MEDICINE

## 2019-05-07 RX ADMIN — NYSTATIN 500000 UNITS: 100000 SUSPENSION ORAL at 19:30

## 2019-05-07 RX ADMIN — NYSTATIN 500000 UNITS: 100000 SUSPENSION ORAL at 12:29

## 2019-05-07 RX ADMIN — OMEPRAZOLE 40 MG: 20 CAPSULE, DELAYED RELEASE ORAL at 19:30

## 2019-05-07 RX ADMIN — MYCOPHENOLATE MOFETIL 1000 MG: 250 CAPSULE ORAL at 09:30

## 2019-05-07 RX ADMIN — BALSALAZIDE DISODIUM 1500 MG: 750 CAPSULE ORAL at 19:30

## 2019-05-07 RX ADMIN — ASPIRIN 81 MG CHEWABLE TABLET 81 MG: 81 TABLET CHEWABLE at 09:30

## 2019-05-07 RX ADMIN — TACROLIMUS 3 MG: 1 CAPSULE ORAL at 09:30

## 2019-05-07 RX ADMIN — CEFEPIME 2 G: 2 INJECTION, POWDER, FOR SOLUTION INTRAVENOUS at 16:08

## 2019-05-07 RX ADMIN — CEFEPIME 2 G: 2 INJECTION, POWDER, FOR SOLUTION INTRAVENOUS at 04:19

## 2019-05-07 RX ADMIN — PREDNISONE 5 MG: 5 TABLET ORAL at 09:30

## 2019-05-07 RX ADMIN — MELATONIN TAB 3 MG 6 MG: 3 TAB at 21:00

## 2019-05-07 RX ADMIN — NYSTATIN 500000 UNITS: 100000 SUSPENSION ORAL at 09:30

## 2019-05-07 RX ADMIN — ROSUVASTATIN CALCIUM 10 MG: 10 TABLET, FILM COATED ORAL at 09:29

## 2019-05-07 RX ADMIN — BUMETANIDE 2 MG: 1 TABLET ORAL at 16:08

## 2019-05-07 RX ADMIN — MAGNESIUM OXIDE TAB 400 MG (241.3 MG ELEMENTAL MG) 400 MG: 400 (241.3 MG) TAB at 19:30

## 2019-05-07 RX ADMIN — BALSALAZIDE DISODIUM 1500 MG: 750 CAPSULE ORAL at 09:31

## 2019-05-07 RX ADMIN — MYCOPHENOLATE MOFETIL 1000 MG: 250 CAPSULE ORAL at 19:30

## 2019-05-07 RX ADMIN — MICAFUNGIN SODIUM 150 MG: 10 INJECTION, POWDER, LYOPHILIZED, FOR SOLUTION INTRAVENOUS at 11:09

## 2019-05-07 RX ADMIN — NYSTATIN 500000 UNITS: 100000 SUSPENSION ORAL at 16:08

## 2019-05-07 RX ADMIN — Medication 2 G: at 19:31

## 2019-05-07 RX ADMIN — MONTELUKAST SODIUM 10 MG: 10 TABLET, COATED ORAL at 21:00

## 2019-05-07 RX ADMIN — DULOXETINE 20 MG: 20 CAPSULE, DELAYED RELEASE ORAL at 09:30

## 2019-05-07 RX ADMIN — POTASSIUM CHLORIDE 20 MEQ: 750 TABLET, EXTENDED RELEASE ORAL at 09:30

## 2019-05-07 RX ADMIN — BUMETANIDE 2 MG: 1 TABLET ORAL at 09:31

## 2019-05-07 RX ADMIN — PREDNISONE 5 MG: 5 TABLET ORAL at 17:36

## 2019-05-07 RX ADMIN — ACETAMINOPHEN 650 MG: 325 TABLET, FILM COATED ORAL at 09:31

## 2019-05-07 RX ADMIN — TACROLIMUS 2.5 MG: 1 CAPSULE ORAL at 17:36

## 2019-05-07 RX ADMIN — FLUTICASONE PROPIONATE 1 SPRAY: 50 SPRAY, METERED NASAL at 19:30

## 2019-05-07 RX ADMIN — LEVOTHYROXINE SODIUM 100 MCG: 25 TABLET ORAL at 09:31

## 2019-05-07 RX ADMIN — MAGNESIUM OXIDE TAB 400 MG (241.3 MG ELEMENTAL MG) 400 MG: 400 (241.3 MG) TAB at 09:31

## 2019-05-07 RX ADMIN — VALGANCICLOVIR 450 MG: 450 TABLET, FILM COATED ORAL at 09:31

## 2019-05-07 RX ADMIN — FLUTICASONE PROPIONATE 1 SPRAY: 50 SPRAY, METERED NASAL at 09:29

## 2019-05-07 ASSESSMENT — ACTIVITIES OF DAILY LIVING (ADL)
ADLS_ACUITY_SCORE: 14

## 2019-05-07 ASSESSMENT — MIFFLIN-ST. JEOR: SCORE: 1364.24

## 2019-05-07 NOTE — PROGRESS NOTES
[facility]    Transplant Infectious Diseases Inpatient Progress note      Everton Larios MRN# [id number]   YOB: 1965 Age: 54 year old   Date of Admission: 4/29/2019  8:36 AM  Transplant: 2/24/2019 (Heart), Postoperative day: 72            Recommendations:   1. Continue cefepime 2 grams q12 hr.   - duration is 4 weeks (till 4/29/2019).   2. Continue micafungin 150 mg daily.   - duration TBD based on follow up CT and final BAL fungal cx.   3. Will need follow up CT on 5/28/2019 before he sees me in the ID clinic.   4. Please let the patient know to follow up with me on 5/28/2019 2:30 PM.   5. Would discontinue VGCV and monitor CMV PCR weekly.   6. Weekly CMP, CBC with diff while on cefepime.         Summary of Presentation:   Transplants:  2/24/2019 (Heart), Postoperative day:  72     This patient is a 54 year old male congenital heart disease s/p OHT in 2/2019 with methylprednisolone induction and TAC/MMF/prednisone for maintenance.   Presented with few days of dyspnea, cough, and subjective fever.   Found to have pseudomonal BSI and pneumonia.         Active Problems and Infectious Diseases Issues:   1. P aeruginosa BSI 4/29/2019, negative since 4/30/2019.   2. P aeruginosa HCAP.   3. Abnormal CT chest.   4. Febrile neutropenia.  5. QTc prolongation and risk for adverse events.      This patient likely has P aeruginosa pneumonia that resulted in transient P aeruginosa bacteremia.   Though the CT scan findings can be explained by P aeruginosa pneumonia, the pulmonary nodules with GGO, positive BD glucan, and the neutropenia are very concerning for fungal infection specifically aspergillosis.     I feel that we need to be aggressive treating this immunocompromised patient's pseudomonal infection with IV and PO drugs (cefepime/cipro), however, I was not able to use fluoroquinolones since his QTc has been >500 for the most part during this hospitalization.   Will continue  IV cefepime for total of 4 weeks.     I feel with the history of neutropenia and the CT findings as well as the positive BD glucan, I am obligated to treat empirically for invasive fungal disease. I would choose isavuconazole given the prolonged QTc, however, isavuconazole was too expensive. So for now will treat empirically with IV micafungin pending repeat CT chest and final BAL fungal cx.     The neutropenia is likely to be due to drugs.   The most likely culprit drugs are MMF/TAC/VGCV/bactrim.   MMF and TAC are important in this OHT recipients. Since the patient is CMV +/+ we can substitute the preemptive measure for the universal CMV ppx, so I would discontinue VGCV and monitor CMV weekly. Bactrim three times a day is unlikely to result in bone marrow suppression, it was resumed.   Neutropenia now resolved after 2 doses of G-CSF 5/1/2019 and 5/2/2019.    Other Infectious Disease issues include:  - QTc 502 5/6/2019.   - PCP prophylaxis: off of bactrim since 4/12/2019. Bactrim resumed 5/7/2019.   - Serostatus: CMV D+/R+, EBV D+/R+, HSV1+/2-, VZV + off and on VGCV for neutropenia.   - Immunization status: Too early to discuss.   - Gamma globulin status: 358 5/1/2019.     Discussed with cards 2 team.   Attestation:  I interviewed the patient and obtained history from the patient, and by reviewing the patient's chart including outside records, microbiological data, and radiological data. All data are summarized in this notes.  Ric Shukla   Pager: [phone]  05/07/2019      Interim History and Events:   The patient feel better today. No new complaints.       ROS:  As mentioned in the interim history otherwise negative by reviewing constitutional symptoms, central and peripheral neurological systems, respiratory system, cardiac system, GI system,  system, musculoskeletal, skin, allergy, and lymphatics.                 Pysical Examination:  Temp: 98.1  F (36.7  C) Temp src: Oral BP: (!) 133/91   Heart Rate: 94  Resp: 16 SpO2: 100 % O2 Device: None (Room air)    Vitals:    05/03/19 0739 05/04/19 0646 05/05/19 0700 05/06/19 0400   Weight: 60.9 kg (134 lb 4.8 oz) 58.6 kg (129 lb 3.2 oz) 57.3 kg (126 lb 6.4 oz) 56.8 kg (125 lb 3.2 oz)    05/07/19 0400   Weight: 56 kg (123 lb 6.4 oz)   Constitutional: awake, alert, cooperative, no apparent distress and appears at stated age, well nourished.   Neurologic: Patient is moving all extremities without focal deficit, no focal sensory loss.   Lungs: CTA bilaterally, no accessory muscle use.   CVS: tachycardic, normal S1/S2, no murmur, PMI was not displaced.   Extremities: no pitting edema of bilateral lower extremities, no ulcers, normal ROM of all joints, no swelling or erythema of any of joints and no tenderness to palpation.   Skin: no induration, fluctuation or discharge, and no rash       Medications:  Medications that Require Transfusion:     ACE/ARB/ARNI NOT PRESCRIBED       BETA BLOCKER NOT PRESCRIBED     Scheduled Medications:     aspirin  81 mg Oral Daily     balsalazide  1,500 mg Oral BID     bumetanide  2 mg Oral BID     ceFEPIme (MAXIPIME) IV  2 g Intravenous Q12H     DULoxetine  20 mg Oral Daily     fluticasone  1 spray Both Nostrils BID     levothyroxine  100 mcg Oral Daily     magnesium oxide  400 mg Oral BID     melatonin  6 mg Oral At Bedtime     micafungin  150 mg Intravenous Q24H     montelukast  10 mg Oral At Bedtime     mycophenolate  1,000 mg Oral BID     nystatin  500,000 Units Oral 4x Daily     omeprazole  40 mg Oral QPM     predniSONE  5 mg Oral BID w/meals     rosuvastatin  10 mg Oral Daily     sulfamethoxazole-trimethoprim  1 tablet Oral Once per day on Mon Wed Fri     tacrolimus  2.5 mg Oral QPM     tacrolimus  3 mg Oral QAM     valGANciclovir  450 mg Oral Daily       Laboratory Data:   No results found for: ACD4    Inflammatory Markers    Recent Labs   Lab Test 05/02/19  0536 04/29/19  0911 11/15/18  0955 10/22/18  1239   SED 37*  --   --   --    CRP 69.0*  120.0* 6.1 12.5       Immune Globulin Studies     Recent Labs   Lab Test 05/01/19  1850   *   IGM 29*   IGE 38          Metabolic Studies       Recent Labs   Lab Test 05/07/19  0451 05/06/19  1757 05/06/19  0438 05/05/19  1635 05/05/19  0720 05/04/19  1738  04/29/19  0918 04/29/19  0911 04/26/19  0848 04/24/19  0903  02/23/19  1835    135 138 135 137 135   < >  --  131* 138 137   < > 133   POTASSIUM 3.4 3.6 3.4 4.2 3.7 3.8   < >  --  3.5 3.8 5.2   < > 3.6   CHLORIDE 105 103 105 104 106 104   < >  --  94 104 105   < > 93*   CO2 24 23 23 23 21 22   < >  --  24 26 20   < > 30   ANIONGAP 9 10 10 8 10 10   < >  --  12 9 11   < > 10   BUN 18 19 20 21 21 22   < >  --  23 47* 51*   < > 28   CR 1.42* 1.42* 1.46* 1.68* 1.64* 1.73*   < >  --  1.04 1.80* 1.70*   < > 1.03   GFRESTIMATED 55* 55* 53* 45* 46* 43*   < >  --  80 41* 44*   < > 81   GLC 90 105* 77 78 70 116*   < >  --  79 134* 83   < > 114*   A1C  --   --   --   --   --   --   --   --   --   --   --   --  5.7*   RADAMES 8.4* 8.3* 8.5 8.6 8.8 8.4*   < >  --  9.0 8.7 8.7   < > 8.9   PHOS  --   --   --   --   --   --   --   --   --  3.6 4.1   < > 3.6   MAG 1.9 2.0 2.4* 2.0 2.1 1.6   < >  --   --  2.2 1.5*   < > 2.0   LACT  --   --   --   --   --   --   --  1.7 1.6  --   --    < >  --     < > = values in this interval not displayed.       Hepatic Studies    Recent Labs   Lab Test 04/29/19  0911 04/04/19  0840 04/01/19  0653 03/26/19  0531 03/18/19  0505 03/15/19  0409 03/14/19  1653  03/10/19  0410   BILITOTAL 1.0 0.6 0.6 0.5 0.8 0.8  --    < > 0.8   ALKPHOS 125 139 135 110 137 88  --    < > 104   ALBUMIN 3.5 2.9* 2.8* 2.4* 2.6* 2.3*  --    < > 2.8*   AST 10 16 14 16 36 20  --    < > 22   ALT 17 26 28 23 57 24  --    < > 19   LDH  --   --   --   --   --   --  320*  --  276*    < > = values in this interval not displayed.       Pancreatitis testing    Recent Labs   Lab Test 04/29/19  0911 03/18/19  0505 02/23/19  1835 11/15/18  0955   AMYLASE  --  51 22*  --     LIPASE 42* 237  --   --    TRIG  --   --   --  112       Hematology Studies      Recent Labs   Lab Test 05/07/19  0451 05/06/19  0438 05/05/19  0720 05/04/19  0545 05/03/19  0435 05/02/19  0536   WBC 6.6 7.4 7.6 6.1 4.1 1.7*   ANEU 4.2 4.8 4.8 3.9 1.6 0.6*   ALYM 1.4 1.4 1.6 0.6* 0.9 0.5*   GRACE 0.9 0.7 0.8 1.2 1.3 0.4   AEOS 0.0 0.0 0.1 0.0 0.0 0.1   HGB 8.4* 8.3* 8.0* 8.5* 8.0* 8.1*   HCT 28.1* 27.2* 26.6* 28.0* 26.4* 26.3*    323 299 240 220 189       Arterial Blood Gas Testing    Recent Labs   Lab Test 03/11/19  1152 03/10/19  1214 03/09/19  0400 03/08/19  2147  02/25/19  1717 02/25/19  1502 02/25/19  0918  02/25/19  0350 02/25/19  0110   PH  --   --   --   --   --  7.32* 7.31* 7.33*  --  7.39 7.35   PCO2  --   --   --   --   --  36 51* 46*  --  42 42   PO2  --   --   --   --   --  110* 75* 125*  --  87 170*   HCO3  --   --   --   --   --  19* 26 24  --  25 23   O2PER 21 21 21 21.0   < > 3L 2  2 40.0  40.0   < > 40.0  40.0 60.0    < > = values in this interval not displayed.        Urine Studies     Recent Labs   Lab Test 04/29/19  1031 03/26/19  1707 03/17/19  0045 03/04/19  1303 02/23/19  1921   URINEPH 6.0 5.5 5.5 5.0 6.0   NITRITE Negative Negative Negative Negative Negative   LEUKEST Negative Negative Negative Negative Negative   WBCU <1 2 15* 0 <1       Vancomycin Levels     Recent Labs   Lab Test 05/02/19  1519   VANCOMYCIN 10.0       Tacrolimus levels    Invalid input(s): TACROLIMUS, TAC, TACR  Transplant Immunosuppression Labs Latest Ref Rng & Units 5/7/2019 5/6/2019 5/6/2019 5/5/2019 5/5/2019   Tacro Level 5.0 - 15.0 ug/L - - - - 9.2   Tacro Level - - - - - Not Provided   Creat 0.66 - 1.25 mg/dL 1.42(H) 1.42(H) 1.46(H) 1.68(H) 1.64(H)   BUN 7 - 30 mg/dL 18 19 20 21 21   WBC 4.0 - 11.0 10e9/L 6.6 - 7.4 - 7.6   Neutrophil % 63.1 - 65.3 - 62.6   ANEU 1.6 - 8.3 10e9/L 4.2 - 4.8 - 4.8         Microbiology:  Blood culture  4/29/2019 P aeruginosa     4/30/2019 onward negative.     Last check of C  difficile  C Diff Toxin B PCR   Date Value Ref Range Status   04/29/2019 Negative NEG^Negative Final     Comment:     Negative: Clostridium difficile target DNA sequences NOT detected, presumed   negative for Clostridium difficile toxin B or the number of bacteria present   may be below the limit of detection for the test.  FDA approved assay performed using [employer] GeneXpert real-time PCR.  A negative result does not exclude actual disease due to Clostridium difficile   and may be due to improper collection, handling and storage of the specimen   or the number of organisms in the specimen is below the detection limit of the   assay.         Virology:  CMV viral loads    Recent Labs   Lab Test 05/06/19  0438 04/30/19  1111   CSPEC Plasma, EDTA anticoagulant Bronchial   CMVLOG Not Calculated Not Calculated       CMV viral loads    Log IU/mL of CMVQNT   Date Value Ref Range Status   05/06/2019 Not Calculated <2.1 [Log_IU]/mL Final   04/30/2019 Not Calculated <2.1 [Log_IU]/mL Final   04/29/2019 Not Calculated <2.1 [Log_IU]/mL Final   04/26/2019 Not Calculated <2.1 [Log_IU]/mL Final   04/24/2019 Not Calculated <2.1 [Log_IU]/mL Final   04/17/2019 Not Calculated <2.1 [Log_IU]/mL Final   04/08/2019 Not Calculated <2.1 [Log_IU]/mL Final   04/01/2019 Not Calculated <2.1 [Log_IU]/mL Final   03/25/2019 Not Calculated <2.1 [Log_IU]/mL Final   03/18/2019 Not Calculated <2.1 [Log_IU]/mL Final       CMV resistance testing  No lab results found.  No results found for: CMVCID, CMVFOS, CMVGAN     No results found for: H6RES    EBV DNA Copies/mL   Date Value Ref Range Status   04/29/2019 EBV DNA Not Detected EBVNEG^EBV DNA Not Detected [Copies]/mL Final   04/26/2019 EBV DNA Not Detected EBVNEG^EBV DNA Not Detected [Copies]/mL Final   03/27/2019 EBV DNA Not Detected EBVNEG^EBV DNA Not Detected [Copies]/mL Final         Imaging:  CT c/a/p 4/29/2019 reviewed by myself   IMPRESSION:   In this patient with history of heart  transplantation,  1. New diffuse bilateral solid with peripheral groundglass pulmonary  nodules/opacities and  mediastinal adenopathy. Constellation of  findings are highly suspicious for infection (typical or atypical  infection) in this immune suppressed patient.  2. New small ascites.  3 Nonspecific small area of poorly defined heterogeneous posterior  right hepatic parenchyma. This may be secondary to the hepatic  fibrosis. Attention on follow-up examination.  4. New compression deformity of T12 vertebral body, since 3/19/2019.        Ric Shukla  Pager: [phone]

## 2019-05-07 NOTE — PROGRESS NOTES
Transplant Social Work Services Progress Note      Date of Initial Social Work Evaluation: 11/16/2018, 5/2/19  Collaborated with: Pt, ID and RNCC    Data: Transplant  continuing to follow. Supportive visit to check-in with pt as we are nearing discharge. There was thought that pt may require IV abx treatment in a TCU due to insurance reasons. In speaking with RNCC, pt has the option of going to an infusion center and plans in progress to finalize that. Pt is eager to discharge home.   Pt expressed financial concerns due long medical course this year. Writer has submitted for financial assistance through [employer] to assist pt with one of his bills; awaiting to hear back if pt qualifies for assistance.     Intervention: Supportive Visit, Discharge Planning   Assessment: Pt feels good and has bright affect today. Pt appreciative of application being submitted to [employer].   Education provided by SW: Ongoing SW support   Plan:    Discharge Plans in Progress: Anticipate discharge home with IV abx at an outpatient infusion center. Refer to RNCC note on full details of outpt infusion plan.     Barriers to d/c plan: Medical Stability     Follow up Plan: SW to continue to follow.

## 2019-05-07 NOTE — PLAN OF CARE
D: Admitted 4/29 with sepsis due to Pseudomonas aeruginosa. Hx: NICM s/p OHT (2/24/2019), pAfib, T12 fracture, ulcerative colitis, hypothyroidism, GERD, BPH and depression    I: Monitored vitals and assessed pt status.   Changed:  Started antifungal: IV Micafungin every 24 hours  K of 3.4 replaced, recheck 5/8    Running:  IV Cefepime every 12 hours  IV Micafungin every 24 hours    PRN:  Tylenol for back pain    A: VSS, A&O, up independently. Pt reports minimal pain throughout shift, relieved with PRN tylenol. Denies SOB, oxygen stable on RA. Tele SR, HR 90's. Blood cultures remain (-), and CMV (-). Voiding well.     Temp:  [98.1  F (36.7  C)-98.7  F (37.1  C)] 98.1  F (36.7  C)  Heart Rate:  [] 94  Resp:  [16-18] 16  BP: (123-139)/(80-97) 133/91  SpO2:  [95 %-100 %] 100 %      P: Heart Biopsy 5/8, then possible discharge. Will continue to monitor Pt status and report changes to treatment team.

## 2019-05-07 NOTE — PROGRESS NOTES
Cardiology Progress Note  Everton Larios MRN: [id number]  Age: 54 year old, : 1965  05/07/19   I personally reviewed the patient's interim history, available interim laboratories and imaging studies.  I confirmed the physical findings and observations of the trainee with whom I saw and discussed the patient.  I agree with the finding and observations elaborated and agree with plan.  Rosendo Beck,      Changes Today:     - continue cefepime 2g BID   - start antifungal - start micafungin 150mg every day    - currently under review at outpatient center    - okay to drive   - Follow up with ID   - Continue bumex 2mg PO BID  - likely stay today through heart biopsy tomorrow; discharge therafter       - Will need weekly CMV checked (ordered today)   - Will need routine heart biopsy this week (~)  - Check DSA this week (ordered for )        Assessment and Plan:     Everton Larios is a 54 year old male with a history of NICM s/p OHT (2019), paroxysmal atrial fibrillation, recent T12 fracture (2019), ulcerative colitis, Pierce's esophagus, hypothyroidism, GERD, BPH and depression who is admitted with Pseudomonas bacteremia, new T12 fracture and multiple new pulmonary groundglass nodules.      #. Pseudomonas bacteremia  Blood cx from  shows pseudomonas bacteremia. Etiology likely pulmonary (possible pseudomonas pneumonia) with CT Chest on  revealing new diffuse bilateral solid with peripheral groundglass pulmonary nodules/opacities and  mediastinal adenopathy.   - Transplant ID following; appreciate assistance   - Antibiotics:              - Ceftazidime  -                - Cefepime - ; restarted cefepime ; planning for 4 weeks               - Zosyn -              - One time tobramycin 240mg IV                - Azithromycin -                             - Vancomycin -,      #. Pulmonary groundglass  nodules  Notable productive cough of yellow sputum over the past few days, chest discomfort and sinus congestion noted. CXR with new nodular opacity overlying the right upper lobe. CT chest shows new diffuse bilateral solid with peripheral groundglass pulmonary nodules/opacities and  mediastinal adenopathy. Also concern for possible fungal infection.   - Pulmonology consulted; appreciate assistance   - Bronchoscopy cultures obtained 4/30/2019: growing pseudomonas   - Transplant ID consulted; appreciate assistance  - Aspergillus galactomannan Ag pending (4/30)  - B-D Glucan positive from 4/30, did not start anti-fungal due to unclear infectious source for the positive B-D glucan and in setting of prolonged QTc - recommended to start anti fungal 5/6; will follow up with SW to determine which antifungal is covered   - Histoplasma U Ag pending  - IgG 106, IgM 29, IgA 106 from 5/1   - Influenza A/B, RSV PCR negative   - Tessalon capsules prn   - Albuterol prn, DuoNebs scheduled   - will need repeat CT in 4 weeks as well as ID follow up in 4 weeks      #. Neutropenia   Afebrile and tachycardic to 120s on admission. Lactate wnl.  initially. UA unremarkable. Patient has noted subjective fevers at home in additional to upper respiratory tract symptoms, diffuse abdominal pain, ~5 episodes of non-bloody diarrhea in past 24 hours with stool softener use. Neutropenia likely in the setting of acute infection from bacteremia of respiratory (bacterial, fungal) or GI etiology (EBV, CMV, URI), medication-induced (bactrim, cellcept and valcyte). Hematological or rheumatological etiologies less likely. C.diff negative.   - Neupogen dose 5/1, 5/2  - Neutropenic precautions   - Transplant ID consulted; appreciate recommendations   - Held home cellcept, valcyte and bactrim - restarted cellcept, valcycyte, bactrim on 5/6   - CMV and EBV PCR negative 4/29, next check 3/7, ordered  - Antibiotics: see above     #. Abdominal pain  #.  Non-bloody diarrhea   Lactate wnl. Pain predominantly epigastric, RUQ and LLQ on exam. Lipase wnl. Considered CMV colitis, C.diff, EBV, UC flare, typhilitis contributing to diarrhea and abdominal pain. CT A/P shows nonspecific small area of poorly defined heterogeneous posterior right hepatic parenchyma possibly secondary to the hepatic fibrosis.   - C. Diff and enteric panel negative   - CMV and EBV PCR 4/29 negative     #. NICM s/p OHT (2/24/2019) with RV dysfunction  Most recent biopsy 4/24/2019 with 1 R mild focal acute cellular rejection with no AMR. Bactrim and Valcyte held since 4/26/2019 due to leukopenia. Serostatus: CMV+, EBV+, HSV1+, HSV2 neg, VZV+.   - Immunosuppresives:                - Held home cellcept               - Continue tacrolimus 3mg qAM and 2.5mg qPM; goal of 8 - 10                                5/5 - tacro level 9.2                - Continue prednisone 5mg BID   - PPx: held valcyte and bactrim in setting of neutropenia. Continue nystatin.   - Diuretics: bumex 2mg IV BID, transitioned to PO bumex 2mg BID 5/6, continue   - ASA 81mg daily  - Rosuvastatin 10mg daily   - Weekly CMV ordered  - Repeat heart biopsy on 5/8/2019  - Check DSA  (ordered for 5/8)      #. Ulcerative colitis: continue home balsalazide 1500mg BID  #. Hypothyroidism: continue levothyroxine   #. GERD and history of Pierce's esophagus: continue omeprazole   #. Depression: continue duloxetine   #. Seasonal allergies: azelastine 0.1% nasal spray, fluticasone, montelukast   #. Insomnia: continue melatonin     Diet/IVF: cardiac diet, NO IVF  DVT ppx: Ambulate  Disposition/Admission Status: admitted for further pseudomonas bacteremia   CODE: Full Code     Patient was discussed with staff attending, Dr. Beck.     Peri Miller [id number]  p 3013             Subjective     No acute events overnight. Feels well this AM. Wondering about plans for discharge and for RHC/biopsy tomorrow.           Objective     Vitals:  Temp:  [98.1  F  (36.7  C)-98.7  F (37.1  C)] 98.1  F (36.7  C)  Pulse:  [100] 100  Heart Rate:  [] 94  Resp:  [16-18] 18  BP: (121-139)/(80-97) 130/94  Cuff Mean (mmHg):  [108] 108  SpO2:  [95 %-98 %] 95 %  MAP: 90-100s     I/O: -1920mL (2.4L UOP)      Vitals:    05/05/19 0700 05/06/19 0400 05/07/19 0400   Weight: 57.3 kg (126 lb 6.4 oz) 56.8 kg (125 lb 3.2 oz) 56 kg (123 lb 6.4 oz)       Gen: NAD, pleasant  HEENT: No scleral icterus, Moist mucous membranes.   CV:  RRR, S1 S2 nl, no murmurs, rubs or gallops   Resp: Normal effort. Faint expiratory wheeze in right upper lobe posteriorly. No other wheezing or crackles noted.   Abdomen: Soft, NT, ND. + Bowel sounds.   Extremities: Trace peripheral edema, warm  Skin: sternotomy scar across torso   Neuro: asleep, grossly non-focal              Data:      Labs reviewed.         Medications     Medications    aspirin  81 mg Oral Daily     balsalazide  1,500 mg Oral BID     bumetanide  2 mg Oral BID     ceFEPIme (MAXIPIME) IV  2 g Intravenous Q12H     DULoxetine  20 mg Oral Daily     fluticasone  1 spray Both Nostrils BID     levothyroxine  100 mcg Oral Daily     magnesium oxide  400 mg Oral BID     melatonin  6 mg Oral At Bedtime     montelukast  10 mg Oral At Bedtime     mycophenolate  1,000 mg Oral BID     nystatin  500,000 Units Oral 4x Daily     omeprazole  40 mg Oral QPM     predniSONE  5 mg Oral BID w/meals     rosuvastatin  10 mg Oral Daily     sulfamethoxazole-trimethoprim  1 tablet Oral Once per day on Mon Wed Fri     tacrolimus  2.5 mg Oral QPM     tacrolimus  3 mg Oral QAM     valGANciclovir  450 mg Oral Daily       ACE/ARB/ARNI NOT PRESCRIBED       BETA BLOCKER NOT PRESCRIBED

## 2019-05-07 NOTE — PLAN OF CARE
D: Admit 4/29 with Pseudomonas bacteremia. History of NICM s/p OHT 2/24/19, paroxysmal atrial fibrillation, recent T12 fracture (4/21/2019), ulcerative colitis, Pierce's esophagus, hypothyroidism, GERD, BPH and depression.     I/A: Pt A/Ox4. VSS on RA. SR/ST. PRN tylenol given for back pain. Voiding adequately. Regular diet, 2L FR. Up independently.     P: Continue to monitor & notify Cards 2 of changes/concerns.

## 2019-05-07 NOTE — PLAN OF CARE
OT 6C  Discharge Planner OT   Patient plan for discharge: parent's home   Current status: Pt IND with bed mobility and LB dressing. Completing ~200 feet x 2 SBA with cane. Tolerating 3 stairs x 3 sets with unilateral hand railing progressing from CGA to SBA. Educated on safe tub transfer.   Barriers to return to prior living situation: medical status   Recommendations for discharge: home with OP CR   Rationale for recommendations: Pt IND with ADLs and mobilizing well. Pt will benefit from OP CR to progress IND with daily activities-orders and appointments already scheduled.       Entered by: Carla Alejo 05/07/2019 3:21 PM

## 2019-05-07 NOTE — PROGRESS NOTES
Care Coordinator - Discharge Planning    Admission Date/Time:  4/29/2019  Attending MD:  Holden Hatch MD   Data  Date of initial CC assessment:  5/3/19  Chart reviewed, discussed with interdisciplinary team.   Patient was admitted for:   1. Sepsis due to Pseudomonas aeruginosa (H)    2. SIRS (systemic inflammatory response syndrome) (H)    3. Heart replaced by transplant (H)    4. Pulmonary nodules    Assessment   Full assessment completed in previous note  Concerns with insurance coverage for discharge needs: Pt does not have insurance coverage for home infusion.   Current Living Situation: Patient states he is still temporarily living at his parents while he recovers post transplant. His parent's address is [address] [place] [id number].   Support System: Supportive and Involved  Services Involved: OP CR ([facility])  Transportation at Discharge: Family or friend will provide  Transportation to Medical Appointments: Parents    Coordination of Care and Referrals: Provided patient/family with options for outpt infusion.      Per MD, pt will need to have Cefepime IV every 12 hrs and Micafungin IV daily at an infusion center; pt is in agreement with this plan and wants to [facility]. This writer spoke with Desiree at [facility] (Ph: [phone]) who made arrangements for daily infusion of Micafungin and to have Cefepime placed on a pump and the cassette changed daily. Due to availability of open appts, pt will go to both [facility] and [facility]. Patient Itinerary list faxed to this writer and given to pt. Pt is in agreement with this plan.   Intervention:      Arrangements made with [facility] along with [facility] (Ph: [phone] Fax: [phone]) for Micafungin 150 mg IV every 24 hours and Cefepime 2 Gm IV every 12 hours (on a pump) and  PICC line care. Starting on 5/9/19 at 11 AM at [facility]. Pt given Patient Itinerary Form for the rest of the appointment schedule.   Plan  Anticipated Discharge Date:  5/8/19  Anticipated Discharge Plan:  Discharge to home with outpt infusion.     MATT ADRIAN RN BSN  Care Coordinator Unit   [phone]

## 2019-05-08 ENCOUNTER — RESULTS ONLY (OUTPATIENT)
Dept: OTHER | Facility: CLINIC | Age: 54
End: 2019-05-08

## 2019-05-08 ENCOUNTER — APPOINTMENT (OUTPATIENT)
Dept: INTERVENTIONAL RADIOLOGY/VASCULAR | Facility: CLINIC | Age: 54
DRG: 853 | End: 2019-05-08
Attending: PHYSICIAN ASSISTANT
Payer: COMMERCIAL

## 2019-05-08 ENCOUNTER — AMBULATORY - HEALTHEAST (OUTPATIENT)
Dept: INFUSION THERAPY | Facility: HOSPITAL | Age: 54
End: 2019-05-08

## 2019-05-08 ENCOUNTER — RECORDS - HEALTHEAST (OUTPATIENT)
Dept: ADMINISTRATIVE | Facility: OTHER | Age: 54
End: 2019-05-08

## 2019-05-08 VITALS
OXYGEN SATURATION: 98 % | WEIGHT: 123.4 LBS | RESPIRATION RATE: 24 BRPM | DIASTOLIC BLOOD PRESSURE: 89 MMHG | BODY MASS INDEX: 18.7 KG/M2 | HEART RATE: 96 BPM | HEIGHT: 68 IN | TEMPERATURE: 98.7 F | SYSTOLIC BLOOD PRESSURE: 123 MMHG

## 2019-05-08 DIAGNOSIS — A41.52 SEPSIS DUE TO PSEUDOMONAS AERUGINOSA (H): ICD-10-CM

## 2019-05-08 DIAGNOSIS — R91.8 PULMONARY NODULES: ICD-10-CM

## 2019-05-08 DIAGNOSIS — Z94.1 TRANSPLANTED HEART (H): ICD-10-CM

## 2019-05-08 LAB
ANION GAP SERPL CALCULATED.3IONS-SCNC: 10 MMOL/L (ref 3–14)
ANISOCYTOSIS BLD QL SMEAR: SLIGHT
BACTERIA SPEC CULT: NO GROWTH
BACTERIA SPEC CULT: NO GROWTH
BASOPHILS # BLD AUTO: 0 10E9/L (ref 0–0.2)
BASOPHILS NFR BLD AUTO: 0 %
BUN SERPL-MCNC: 20 MG/DL (ref 7–30)
CALCIUM SERPL-MCNC: 8.4 MG/DL (ref 8.5–10.1)
CHLORIDE SERPL-SCNC: 102 MMOL/L (ref 94–109)
CO2 SERPL-SCNC: 26 MMOL/L (ref 20–32)
CREAT SERPL-MCNC: 1.33 MG/DL (ref 0.66–1.25)
DIFFERENTIAL METHOD BLD: ABNORMAL
EOSINOPHIL # BLD AUTO: 0.1 10E9/L (ref 0–0.7)
EOSINOPHIL NFR BLD AUTO: 1.7 %
ERYTHROCYTE [DISTWIDTH] IN BLOOD BY AUTOMATED COUNT: 16.5 % (ref 10–15)
GFR SERPL CREATININE-BSD FRML MDRD: 59 ML/MIN/{1.73_M2}
GLUCOSE SERPL-MCNC: 96 MG/DL (ref 70–99)
HCT VFR BLD AUTO: 28.7 % (ref 40–53)
HGB BLD-MCNC: 9 G/DL (ref 13.3–17.7)
INR PPP: 1.22 (ref 0.86–1.14)
LYMPHOCYTES # BLD AUTO: 1 10E9/L (ref 0.8–5.3)
LYMPHOCYTES NFR BLD AUTO: 15.4 %
Lab: NORMAL
Lab: NORMAL
MAGNESIUM SERPL-MCNC: 2.1 MG/DL (ref 1.6–2.3)
MCH RBC QN AUTO: 29.3 PG (ref 26.5–33)
MCHC RBC AUTO-ENTMCNC: 31.4 G/DL (ref 31.5–36.5)
MCV RBC AUTO: 94 FL (ref 78–100)
MONOCYTES # BLD AUTO: 0.7 10E9/L (ref 0–1.3)
MONOCYTES NFR BLD AUTO: 11.1 %
MYELOCYTES # BLD: 0.1 10E9/L
MYELOCYTES NFR BLD MANUAL: 0.9 %
NEUTROPHILS # BLD AUTO: 4.4 10E9/L (ref 1.6–8.3)
NEUTROPHILS NFR BLD AUTO: 69.2 %
OVALOCYTES BLD QL SMEAR: SLIGHT
PLATELET # BLD AUTO: 372 10E9/L (ref 150–450)
PLATELET # BLD EST: ABNORMAL 10*3/UL
POIKILOCYTOSIS BLD QL SMEAR: SLIGHT
POTASSIUM SERPL-SCNC: 3.4 MMOL/L (ref 3.4–5.3)
PROMYELOCYTES # BLD MANUAL: 0.1 10E9/L
PROMYELOCYTES NFR BLD MANUAL: 1.7 %
RBC # BLD AUTO: 3.07 10E12/L (ref 4.4–5.9)
SODIUM SERPL-SCNC: 137 MMOL/L (ref 133–144)
SPECIMEN SOURCE: NORMAL
SPECIMEN SOURCE: NORMAL
TACROLIMUS BLD-MCNC: 12 UG/L (ref 5–15)
TME LAST DOSE: NORMAL H
WBC # BLD AUTO: 6.3 10E9/L (ref 4–11)

## 2019-05-08 PROCEDURE — 25000132 ZZH RX MED GY IP 250 OP 250 PS 637: Performed by: INTERNAL MEDICINE

## 2019-05-08 PROCEDURE — 27210885 ZZH ACCESSORY CR4

## 2019-05-08 PROCEDURE — 25000131 ZZH RX MED GY IP 250 OP 636 PS 637: Performed by: INTERNAL MEDICINE

## 2019-05-08 PROCEDURE — 86832 HLA CLASS I HIGH DEFIN QUAL: CPT | Performed by: INTERNAL MEDICINE

## 2019-05-08 PROCEDURE — 40000558 ZZH STATISTIC CVC DRESSING CHANGE

## 2019-05-08 PROCEDURE — 27210903 ZZH KIT CR5

## 2019-05-08 PROCEDURE — 88350 IMFLUOR EA ADDL 1ANTB STN PX: CPT | Performed by: INTERNAL MEDICINE

## 2019-05-08 PROCEDURE — 36573 INSJ PICC RS&I 5 YR+: CPT

## 2019-05-08 PROCEDURE — 99239 HOSP IP/OBS DSCHRG MGMT >30: CPT | Mod: 25 | Performed by: INTERNAL MEDICINE

## 2019-05-08 PROCEDURE — 80197 ASSAY OF TACROLIMUS: CPT | Performed by: INTERNAL MEDICINE

## 2019-05-08 PROCEDURE — C1894 INTRO/SHEATH, NON-LASER: HCPCS | Performed by: INTERNAL MEDICINE

## 2019-05-08 PROCEDURE — 25000125 ZZHC RX 250: Performed by: INTERNAL MEDICINE

## 2019-05-08 PROCEDURE — 25000128 H RX IP 250 OP 636: Performed by: INTERNAL MEDICINE

## 2019-05-08 PROCEDURE — 85610 PROTHROMBIN TIME: CPT | Performed by: INTERNAL MEDICINE

## 2019-05-08 PROCEDURE — 25000132 ZZH RX MED GY IP 250 OP 250 PS 637: Performed by: STUDENT IN AN ORGANIZED HEALTH CARE EDUCATION/TRAINING PROGRAM

## 2019-05-08 PROCEDURE — 27210908 ZZH NEEDLE CR4

## 2019-05-08 PROCEDURE — 93505 ENDOMYOCARDIAL BIOPSY: CPT | Performed by: INTERNAL MEDICINE

## 2019-05-08 PROCEDURE — 02HV33Z INSERTION OF INFUSION DEVICE INTO SUPERIOR VENA CAVA, PERCUTANEOUS APPROACH: ICD-10-PCS | Performed by: PHYSICIAN ASSISTANT

## 2019-05-08 PROCEDURE — 85025 COMPLETE CBC W/AUTO DIFF WBC: CPT | Performed by: INTERNAL MEDICINE

## 2019-05-08 PROCEDURE — 88307 TISSUE EXAM BY PATHOLOGIST: CPT | Performed by: INTERNAL MEDICINE

## 2019-05-08 PROCEDURE — 36415 COLL VENOUS BLD VENIPUNCTURE: CPT | Performed by: INTERNAL MEDICINE

## 2019-05-08 PROCEDURE — 88346 IMFLUOR 1ST 1ANTB STAIN PX: CPT | Performed by: INTERNAL MEDICINE

## 2019-05-08 PROCEDURE — 27210794 ZZH OR GENERAL SUPPLY STERILE: Performed by: INTERNAL MEDICINE

## 2019-05-08 PROCEDURE — 27210732 ZZH ACCESSORY CR1

## 2019-05-08 PROCEDURE — 25800030 ZZH RX IP 258 OP 636: Performed by: INTERNAL MEDICINE

## 2019-05-08 PROCEDURE — 02BM3ZX EXCISION OF VENTRICULAR SEPTUM, PERCUTANEOUS APPROACH, DIAGNOSTIC: ICD-10-PCS | Performed by: INTERNAL MEDICINE

## 2019-05-08 PROCEDURE — 93505 ENDOMYOCARDIAL BIOPSY: CPT | Mod: 26 | Performed by: INTERNAL MEDICINE

## 2019-05-08 PROCEDURE — C1751 CATH, INF, PER/CENT/MIDLINE: HCPCS

## 2019-05-08 PROCEDURE — 86833 HLA CLASS II HIGH DEFIN QUAL: CPT | Performed by: INTERNAL MEDICINE

## 2019-05-08 PROCEDURE — 80048 BASIC METABOLIC PNL TOTAL CA: CPT | Performed by: INTERNAL MEDICINE

## 2019-05-08 PROCEDURE — 83735 ASSAY OF MAGNESIUM: CPT | Performed by: INTERNAL MEDICINE

## 2019-05-08 RX ORDER — HEPARIN SODIUM,PORCINE 10 UNIT/ML
5-10 VIAL (ML) INTRAVENOUS
Status: DISCONTINUED | OUTPATIENT
Start: 2019-05-08 | End: 2019-05-08 | Stop reason: HOSPADM

## 2019-05-08 RX ORDER — NICOTINE POLACRILEX 4 MG
15-30 LOZENGE BUCCAL
Status: DISCONTINUED | OUTPATIENT
Start: 2019-05-08 | End: 2019-05-08 | Stop reason: HOSPADM

## 2019-05-08 RX ORDER — HEPARIN SODIUM,PORCINE 10 UNIT/ML
5 VIAL (ML) INTRAVENOUS
Status: DISCONTINUED | OUTPATIENT
Start: 2019-05-08 | End: 2019-05-08 | Stop reason: HOSPADM

## 2019-05-08 RX ORDER — LIDOCAINE 40 MG/G
CREAM TOPICAL
Status: DISCONTINUED | OUTPATIENT
Start: 2019-05-08 | End: 2019-05-08 | Stop reason: HOSPADM

## 2019-05-08 RX ORDER — HEPARIN SODIUM,PORCINE 10 UNIT/ML
2-5 VIAL (ML) INTRAVENOUS
Status: COMPLETED | OUTPATIENT
Start: 2019-05-08 | End: 2019-05-08

## 2019-05-08 RX ORDER — TACROLIMUS 1 MG/1
CAPSULE ORAL
COMMUNITY
Start: 2019-05-08 | End: 2019-06-04

## 2019-05-08 RX ORDER — POTASSIUM CHLORIDE 1500 MG/1
20 TABLET, EXTENDED RELEASE ORAL 2 TIMES DAILY
Qty: 120 TABLET | Refills: 0 | COMMUNITY
Start: 2019-05-08 | End: 2019-05-24

## 2019-05-08 RX ORDER — TACROLIMUS 1 MG/1
CAPSULE ORAL
COMMUNITY
Start: 2019-05-08 | End: 2019-05-08

## 2019-05-08 RX ORDER — MYCOPHENOLATE MOFETIL 250 MG/1
1000 CAPSULE ORAL 2 TIMES DAILY
Qty: 180 CAPSULE | Refills: 11 | COMMUNITY
Start: 2019-05-08 | End: 2019-05-24

## 2019-05-08 RX ORDER — TACROLIMUS 0.5 MG/1
CAPSULE ORAL
Status: ON HOLD | COMMUNITY
Start: 2019-05-08 | End: 2019-05-22

## 2019-05-08 RX ORDER — VALGANCICLOVIR 450 MG/1
450 TABLET, FILM COATED ORAL DAILY
Status: DISCONTINUED | OUTPATIENT
Start: 2019-05-08 | End: 2019-05-08 | Stop reason: HOSPADM

## 2019-05-08 RX ORDER — HEPARIN SODIUM,PORCINE 10 UNIT/ML
5 VIAL (ML) INTRAVENOUS EVERY 24 HOURS
Status: DISCONTINUED | OUTPATIENT
Start: 2019-05-08 | End: 2019-05-08 | Stop reason: HOSPADM

## 2019-05-08 RX ORDER — TACROLIMUS 0.5 MG/1
CAPSULE ORAL
COMMUNITY
Start: 2019-05-08 | End: 2019-05-08

## 2019-05-08 RX ORDER — DEXTROSE MONOHYDRATE 25 G/50ML
25-50 INJECTION, SOLUTION INTRAVENOUS
Status: DISCONTINUED | OUTPATIENT
Start: 2019-05-08 | End: 2019-05-08 | Stop reason: HOSPADM

## 2019-05-08 RX ORDER — VALGANCICLOVIR 450 MG/1
450 TABLET, FILM COATED ORAL DAILY
Qty: 30 TABLET | Refills: 3 | Status: SHIPPED | OUTPATIENT
Start: 2019-05-08 | End: 2019-05-24

## 2019-05-08 RX ORDER — SULFAMETHOXAZOLE/TRIMETHOPRIM 800-160 MG
TABLET ORAL
Qty: 10 TABLET | Refills: 0 | COMMUNITY
Start: 2019-05-08 | End: 2019-05-15

## 2019-05-08 RX ADMIN — NYSTATIN 500000 UNITS: 100000 SUSPENSION ORAL at 16:38

## 2019-05-08 RX ADMIN — MYCOPHENOLATE MOFETIL 1000 MG: 250 CAPSULE ORAL at 07:53

## 2019-05-08 RX ADMIN — BUMETANIDE 2 MG: 1 TABLET ORAL at 16:38

## 2019-05-08 RX ADMIN — BUMETANIDE 2 MG: 1 TABLET ORAL at 07:53

## 2019-05-08 RX ADMIN — ACETAMINOPHEN 650 MG: 325 TABLET, FILM COATED ORAL at 16:43

## 2019-05-08 RX ADMIN — LEVOTHYROXINE SODIUM 100 MCG: 25 TABLET ORAL at 07:53

## 2019-05-08 RX ADMIN — CEFEPIME 2 G: 2 INJECTION, POWDER, FOR SOLUTION INTRAVENOUS at 16:38

## 2019-05-08 RX ADMIN — PREDNISONE 5 MG: 5 TABLET ORAL at 07:53

## 2019-05-08 RX ADMIN — NYSTATIN 500000 UNITS: 100000 SUSPENSION ORAL at 11:42

## 2019-05-08 RX ADMIN — ROSUVASTATIN CALCIUM 10 MG: 10 TABLET, FILM COATED ORAL at 07:54

## 2019-05-08 RX ADMIN — NYSTATIN 500000 UNITS: 100000 SUSPENSION ORAL at 07:53

## 2019-05-08 RX ADMIN — TACROLIMUS 3 MG: 1 CAPSULE ORAL at 07:53

## 2019-05-08 RX ADMIN — BALSALAZIDE DISODIUM 1500 MG: 750 CAPSULE ORAL at 07:54

## 2019-05-08 RX ADMIN — FLUTICASONE PROPIONATE 1 SPRAY: 50 SPRAY, METERED NASAL at 07:57

## 2019-05-08 RX ADMIN — SULFAMETHOXAZOLE AND TRIMETHOPRIM 1 TABLET: 800; 160 TABLET ORAL at 09:13

## 2019-05-08 RX ADMIN — MAGNESIUM OXIDE TAB 400 MG (241.3 MG ELEMENTAL MG) 400 MG: 400 (241.3 MG) TAB at 07:53

## 2019-05-08 RX ADMIN — ASPIRIN 81 MG CHEWABLE TABLET 81 MG: 81 TABLET CHEWABLE at 07:54

## 2019-05-08 RX ADMIN — Medication 2 ML: at 16:16

## 2019-05-08 RX ADMIN — VALGANCICLOVIR 450 MG: 450 TABLET, FILM COATED ORAL at 17:38

## 2019-05-08 RX ADMIN — ACETAMINOPHEN 650 MG: 325 TABLET, FILM COATED ORAL at 07:53

## 2019-05-08 RX ADMIN — MICAFUNGIN SODIUM 150 MG: 10 INJECTION, POWDER, LYOPHILIZED, FOR SOLUTION INTRAVENOUS at 10:42

## 2019-05-08 RX ADMIN — CEFEPIME 2 G: 2 INJECTION, POWDER, FOR SOLUTION INTRAVENOUS at 03:58

## 2019-05-08 RX ADMIN — POTASSIUM CHLORIDE 20 MEQ: 750 TABLET, EXTENDED RELEASE ORAL at 14:31

## 2019-05-08 RX ADMIN — DULOXETINE 20 MG: 20 CAPSULE, DELAYED RELEASE ORAL at 07:54

## 2019-05-08 ASSESSMENT — ACTIVITIES OF DAILY LIVING (ADL)
ADLS_ACUITY_SCORE: 14

## 2019-05-08 NOTE — PLAN OF CARE
DISCHARGE   Discharged to: Home  Via: Automobile  Accompanied by: Parents  Discharge Instructions: diet, activity, medications, follow up appointments, when to call the MD, and what to watchout for (i.e. s/s of infection, increasing SOB, palpitations, chest pain,)  Prescriptions: No new prescriptions for this patient  Follow Up Appointments: arranged; information given  Belongings: All sent with pt, received meds from security  IV: out  Telemetry: off  Pt exhibits understanding of above discharge instructions; all questions answered.  Discharge Paperwork: faxed

## 2019-05-08 NOTE — PLAN OF CARE
Shift:   VS: Temp: 98.4  F (36.9  C) Temp src: Oral BP: (!) 128/92   Heart Rate: 100 Resp: 16 SpO2: 99 % O2 Device: None (Room air)    Pain: Denies pain/nausea  Neuro: A&Ox4, calls appropriately  Cardiac:   Tele SR, denies chest pain  Respiratory: Tolerating RA. Denies SOB.  GI/Diet/Appetite: Good oral intake. Last BM yesterday.  :  Adequate UO in urinal post bumex  LDA's: PIV SL  Skin: No new deficit noted  Activity: Up ad janeth  Tests/Procedures:   Pertinent Labs/Lab Collection: Magnesium replacement completed.     Plan: Heart Biopsy 5/8, then possible discharge. Pt to remain NPO after midnight. Continue with plan of cares and update MD with any changes.

## 2019-05-08 NOTE — PROCEDURES
Interventional Radiology Brief Post Procedure Note    Procedure: IR PICC PLACEMENT > 5 YRS OF AGE    Proceduralist: [facility]an [facility]    Assistant: PAULIE Garcia    Time Out: Prior to the start of the procedure and with procedural staff participation, I verbally confirmed the patient s identity using two indicators, relevant allergies, that the procedure was appropriate and matched the consent or emergent situation, and that the correct equipment/implants were available. Immediately prior to starting the procedure I conducted the Time Out with the procedural staff and re-confirmed the patient s name, procedure, and site/side. (The Joint Commission universal protocol was followed.)  Yes    Medications   Medication Event Details Admin User Admin Time   heparin lock flush 10 UNIT/ML injection 2-5 mL Medication Given by Other Clinician Dose: 2 mL; Route: Intracatheter Albee, Duane A, RN 5/8/2019  4:16 PM       Sedation: None. Local Anesthestic used    Findings: Image guided placement of right basilic vein, 4 Fr., 29.5 cm., single lumen PICC. PICC is ready for use.    Estimated Blood Loss: Minimal    Fluoroscopy Time: 0.7 minute(s)    SPECIMENS: None    Complications: 1. None     Condition: Stable    Plan: Follow up per primary team.    Comments: See dictated procedure note for full details.    Magan Wayne PA-C

## 2019-05-08 NOTE — PROGRESS NOTES
Cardiology Progress Note  Everton Larios MRN: [id number]  Age: 54 year old, : 1965     I personally saw and examined this patient, reviewed imaging and laboratory studies, confirmed physical examination and discussed results and plan with patient and housestaff      Changes Today:     - continue cefepime 2g BID , micafungin 150mg every day - receive both today, plan for discharge after   - heart biopsy today   - PICC line today      For discharge:  - Already set up at [facility] center starting   - will need weekly CBC with diff, CMP, CMV PCR - discussed with [facility]   - follow up with ID on 2019; will need repeat chest CT before that appointment   - will need follow up in CORE/Cardiology next week           Assessment and Plan:     Everton Larios is a 54 year old male with a history of NICM s/p OHT (2019), paroxysmal atrial fibrillation, recent T12 fracture (2019), ulcerative colitis, Pierce's esophagus, hypothyroidism, GERD, BPH and depression who is admitted with Pseudomonas bacteremia, new T12 fracture and multiple new pulmonary groundglass nodules.      #. Pseudomonas bacteremia  Blood cx from  shows pseudomonas bacteremia. Etiology likely pulmonary (possible pseudomonas pneumonia) with CT Chest on  revealing new diffuse bilateral solid with peripheral groundglass pulmonary nodules/opacities and  mediastinal adenopathy.   - Transplant ID following; appreciate assistance   - Antibiotics:              - Ceftazidime  -                - Cefepime - ; restarted cefepime ; planning for 4 weeks               - Zosyn -              - One time tobramycin 240mg IV                - Azithromycin -                             - Vancomycin -,      #. Pulmonary groundglass nodules  Notable productive cough of yellow sputum over the past few days, chest discomfort and sinus congestion noted. CXR with new  nodular opacity overlying the right upper lobe. CT chest shows new diffuse bilateral solid with peripheral groundglass pulmonary nodules/opacities and  mediastinal adenopathy. Also concern for possible fungal infection.   - Bronchoscopy cultures obtained 4/30/2019: growing pseudomonas   - Aspergillus galactomannan Ag pending (4/30)  - B-D Glucan positive from 4/30, did not start anti-fungal due to unclear infectious source for the positive B-D glucan and in setting of prolonged QTc    - Histoplasma U Ag pending  - IgG 106, IgM 29, IgA 106 from 5/1   - Influenza A/B, RSV PCR negative   - Tessalon capsules prn   - Albuterol prn, DuoNebs scheduled     Transplant ID consulted; appreciate assistance  - recommended to start anti fungal; started on micafungin on 5/7 (150mg IV daily) - until follow up CT and final BAL cultures  - will need repeat CT in 4 weeks as well as ID follow up in 4 weeks (scheduled for 5/28/2019 with Dr. Shukla)     #. Neutropenia   Afebrile and tachycardic to 120s on admission. Lactate wnl.  initially. UA unremarkable. Patient has noted subjective fevers at home in additional to upper respiratory tract symptoms, diffuse abdominal pain, ~5 episodes of non-bloody diarrhea in past 24 hours with stool softener use. Neutropenia likely in the setting of acute infection from bacteremia of respiratory (bacterial, fungal) or GI etiology (EBV, CMV, URI), medication-induced (bactrim, cellcept and valcyte). Hematological or rheumatological etiologies less likely. C.diff negative.   - Neupogen dose 5/1, 5/2  - Neutropenic precautions   - Transplant ID consulted; appreciate recommendations   - Held home cellcept, valcyte and bactrim - restarted cellcept, valcycyte, bactrim on 5/6               - per discussion with ID, will hold valcyte and instead check weekly CMV   - CMV and EBV PCR negative 4/29, next check 3/7, ordered  - Antibiotics: see above     #. Abdominal pain  #. Non-bloody diarrhea   Lactate  wnl. Pain predominantly epigastric, RUQ and LLQ on exam. Lipase wnl. Considered CMV colitis, C.diff, EBV, UC flare, typhilitis contributing to diarrhea and abdominal pain. CT A/P shows nonspecific small area of poorly defined heterogeneous posterior right hepatic parenchyma possibly secondary to the hepatic fibrosis.   - C. Diff and enteric panel negative   - CMV and EBV PCR 4/29 negative     #. NICM s/p OHT (2/24/2019) with RV dysfunction  Most recent biopsy 4/24/2019 with 1 R mild focal acute cellular rejection with no AMR. Bactrim and Valcyte held since 4/26/2019 due to leukopenia. Serostatus: CMV+, EBV+, HSV1+, HSV2 neg, VZV+.   - Immunosuppresives:                - Held home cellcept               - Continue tacrolimus 3mg qAM and 2.5mg qPM; goal of 8 - 10                                5/5 - tacro level 9.2                - Continue prednisone 5mg BID   - PPx: held valcyte and bactrim in setting of neutropenia. Continue nystatin.   - Diuretics: bumex 2mg IV BID, transitioned to PO bumex 2mg BID 5/6, continue   - ASA 81mg daily  - Rosuvastatin 10mg daily   - Weekly CMV ordered  - Repeat heart biopsy on 5/8/2019  - Check DSA  (ordered for today)      #. Ulcerative colitis: continue home balsalazide 1500mg BID  #. Hypothyroidism: continue levothyroxine   #. GERD and history of Pierce's esophagus: continue omeprazole   #. Depression: continue duloxetine   #. Seasonal allergies: azelastine 0.1% nasal spray, fluticasone, montelukast   #. Insomnia: continue melatonin     Diet/IVF: cardiac diet, NO IVF  DVT ppx: Ambulate  Disposition/Admission Status: d/c today after cefepime dose ; set up for infusion center on 5/9/2019  CODE: Full Code     Patient was discussed with staff attending, Dr. Beck.     Peri Miller [id number]  p [id number]             Subjective     No acute events overnight. Feels well this AM.               Objective     Vitals:  Temp:  [98.1  F (36.7  C)-98.4  F (36.9  C)] 98.4  F (36.9  C)  Heart Rate:   [] 95  Resp:  [16-18] 18  BP: (123-139)/(82-95) 139/95  SpO2:  [98 %-100 %] 98 %  MAP: 90-100s       Vitals:    05/05/19 0700 05/06/19 0400 05/07/19 0400   Weight: 57.3 kg (126 lb 6.4 oz) 56.8 kg (125 lb 3.2 oz) 56 kg (123 lb 6.4 oz)       Gen: NAD, pleasant  HEENT: No scleral icterus, Moist mucous membranes.   CV:  RRR, S1 S2 nl, no murmurs, rubs or gallops   Resp: Normal effort. Faint expiratory wheeze in right upper lobe posteriorly. No other wheezing or crackles noted.   Abdomen: Soft, NT, ND. + Bowel sounds.   Extremities: Trace peripheral edema, warm  Skin: sternotomy scar across torso   Neuro: asleep, grossly non-focal              Data:      Labs reviewed.         Medications     Medications    aspirin  81 mg Oral Daily     balsalazide  1,500 mg Oral BID     bumetanide  2 mg Oral BID     ceFEPIme (MAXIPIME) IV  2 g Intravenous Q12H     DULoxetine  20 mg Oral Daily     fluticasone  1 spray Both Nostrils BID     levothyroxine  100 mcg Oral Daily     magnesium oxide  400 mg Oral BID     melatonin  6 mg Oral At Bedtime     micafungin  150 mg Intravenous Q24H     montelukast  10 mg Oral At Bedtime     mycophenolate  1,000 mg Oral BID     nystatin  500,000 Units Oral 4x Daily     omeprazole  40 mg Oral QPM     predniSONE  5 mg Oral BID w/meals     rosuvastatin  10 mg Oral Daily     sulfamethoxazole-trimethoprim  1 tablet Oral Once per day on Mon Wed Fri     tacrolimus  2.5 mg Oral QPM     tacrolimus  3 mg Oral QAM       ACE/ARB/ARNI NOT PRESCRIBED       BETA BLOCKER NOT PRESCRIBED

## 2019-05-08 NOTE — PROGRESS NOTES
Patient said he has persistent Left SVC syndrome, PICC was on HOLD. Patient with Persistent left SVC syndrome needs to have PICC placed by IR under flouroscopy machine.

## 2019-05-08 NOTE — CONSULTS
INTERVENTIONAL RADIOLOGY CONSULT    Everton Larios is a 54 year old male with Pseudomonas bacteremia requiring intravenous antibiotic course so IR has been requested to place a fluoroscopy-guided single-lumen PICC line.  This was deferred from vascular access due to the patient's persistent left SVC syndrome.    Patient is on IR schedule today for a single-lumen PIC placement. No NPO required. Consent will be done prior to procedure.     Discussed with vascular access.    Domo Hale PA-C  Interventional Radiology  [phone]

## 2019-05-08 NOTE — PLAN OF CARE
D: Bacteremia. Neutropenic.      I/A: VSS. Pain manageable, declined medication. Sinus rhythm. Slept most of shift. Patient is looking forward to going home. Up ad janeth.      P: NPO for Right heart cath today. Discharge after. Continue to monitor.

## 2019-05-08 NOTE — IR NOTE
Pt. Identified, positioned supine , prepped, time out with PARVEZ Wayne PA-C for single lumen PICC in right arm. Flushed with heparin, ready to use.

## 2019-05-09 ENCOUNTER — TELEPHONE (OUTPATIENT)
Dept: TRANSPLANT | Facility: CLINIC | Age: 54
End: 2019-05-09

## 2019-05-09 ENCOUNTER — INFUSION - HEALTHEAST (OUTPATIENT)
Dept: INFUSION THERAPY | Facility: HOSPITAL | Age: 54
End: 2019-05-09

## 2019-05-09 DIAGNOSIS — R91.8 PULMONARY NODULES: ICD-10-CM

## 2019-05-09 DIAGNOSIS — A41.52 SEPSIS DUE TO PSEUDOMONAS AERUGINOSA (H): ICD-10-CM

## 2019-05-09 LAB
ASPERGILLUS AB TITR SER CF: NORMAL {TITER}
B DERMAT AB SER-ACNC: 0.2 IV
COCCIDIOIDES AB TITR SER CF: NORMAL {TITER}
COPATH REPORT: NORMAL
DONOR IDENTIFICATION: NORMAL
DSA COMMENTS: NORMAL
DSA PRESENT: NO
DSA TEST METHOD: NORMAL
H CAPSUL MYC AB TITR SER CF: NORMAL {TITER}
H CAPSUL YST AB TITR SER CF: NORMAL {TITER}
ORGAN: NORMAL
SA1 CELL: NORMAL
SA1 COMMENTS: NORMAL
SA1 HI RISK ABY: NORMAL
SA1 MOD RISK ABY: NORMAL
SA1 TEST METHOD: NORMAL
SA2 CELL: NORMAL
SA2 COMMENTS: NORMAL
SA2 HI RISK ABY UA: NORMAL
SA2 MOD RISK ABY: NORMAL
SA2 TEST METHOD: NORMAL
UNACCEPTABLE ANTIGEN: NORMAL
UNOS CPRA: 0

## 2019-05-09 NOTE — PLAN OF CARE
Occupational Therapy Discharge Summary    Reason for therapy discharge:    Discharged to home with outpatient therapy.    Progress towards therapy goal(s). See goals on Care Plan in [facility] electronic health record for goal details.  Goals partially met.  Barriers to achieving goals:   discharge from facility.    Therapy recommendation(s):    Continued therapy is recommended.  Rationale/Recommendations:  Pt IND with ADLs and mobilizing well. Pt will benefit from OP CR to progress IND with daily activities-orders and appointments already scheduled..

## 2019-05-09 NOTE — TELEPHONE ENCOUNTER
Post discharge phone call placed. No answer. VM left. Message also sent to  to get patient on calendar early next week for post heart transplant follow up. HBX results still pending.

## 2019-05-10 ENCOUNTER — INFUSION - HEALTHEAST (OUTPATIENT)
Dept: INFUSION THERAPY | Facility: CLINIC | Age: 54
End: 2019-05-10

## 2019-05-10 ENCOUNTER — RECORDS - HEALTHEAST (OUTPATIENT)
Dept: ADMINISTRATIVE | Facility: OTHER | Age: 54
End: 2019-05-10

## 2019-05-10 ENCOUNTER — COMMUNICATION - HEALTHEAST (OUTPATIENT)
Dept: INTERNAL MEDICINE | Facility: CLINIC | Age: 54
End: 2019-05-10

## 2019-05-10 DIAGNOSIS — A41.52 SEPSIS DUE TO PSEUDOMONAS AERUGINOSA (H): ICD-10-CM

## 2019-05-10 DIAGNOSIS — R91.8 PULMONARY NODULES: ICD-10-CM

## 2019-05-10 NOTE — DISCHARGE SUMMARY
"  [facility]   Cardiology II Service / Advanced Heart Failure  Discharge Summary   I personally reviewed the patient's interim history, available interim laboratories and imaging studies.  I confirmed the physical findings and observations of the trainee with whom I saw and discussed the patient.  I agree with the finding and observations elaborated and agree with plans outlined for discharge and home self-management  40 minutes.  Rosendo BeckEverton MRN# [id number]   YOB: 1965 Age: 54 year old     DATE OF ADMISSION:  4/29/2019  DATE OF DISCHARGE\"   5/8/2019  ADMITTING PROVIDER: Holden Hatch MD  DISCHARGE PROVIDER: Rosendo Beck MD  PRIMARY PROVIDER: Fredrick Wolff         Reason for Admission:   Neutropenic fever           Discharge Diagnosis:     Pseudomonas Bacteremia  Pseudomonal Pneumonia  Concern for Possible Concomitant Fungal Infection   Neutropenia secondary to infection, medications  NICM s/p OHT 2/24/2019 on immunosuppression          Follow Up:     Weekly Labs at [facility]   Infectious Disease 5/28/2019  Heart Transplant Team - to be scheduled early next week          Hospital Course by Problem:      Everton Larios is a 54 year old male with a history of NICM s/p OHT (2/24/2019), paroxysmal atrial fibrillation, recent T12 fracture (4/21/2019), ulcerative colitis, Pierce's esophagus, hypothyroidism, GERD, BPH and depression who was admitted on 4/29. The following issues were addressed during this hospitalization:      #. Pseudomonas bacteremia  Started on empiric antibiotics on admission. Blood cx from 4/29 shows pseudomonas bacteremia. Etiology likely pulmonary (possible pseudomonas pneumonia) with CT Chest on 4/30 revealing new diffuse bilateral solid with peripheral ground glass pulmonary nodules/opacities and  mediastinal adenopathy. Transplant ID was consulted and guided antibiotic therapy,  Repeat blood cultures were monitored. He was " discharged home on a course of cefepime, plan to continue for four weeks.    Follow up   Infusion Center at [facility], daily   Weekly CBC with diff, CMP, CMV Titer   Repeat Chest CT in four weeks   ID follow up in four weeks      #. Pulmonary ground glass nodules   Pseudomonas Pneumonia  Concern for Possible Fungal Infection   Notable productive cough of yellow sputum on days prior to admission. CXR with new nodular opacity overlying the right upper lobe. CT chest shows new diffuse bilateral solid with peripheral ground glass pulmonary nodules/opacities and  mediastinal adenopathy. Pulmonology was consulted and patient underwent bronchoscopy on 4/30/2019. Transplant ID also consulted as above. BAL grew pseudomonas. Notably, patient's serum 1,3 beta d glucan fungitell was positive. This, combined with patient's neutropenia and finding on chest CT prompted empiric treatment of fungal infection with micafungin. Everton remained on both coverage for pseudomonas and fungal infection at discharge, follow up as above.      #. Neutropenia   Presented to [facility] with complaints of subjective fevers, various symptoms including cough, shortness of breath, abdominal pain and diarrhea. In the ED, he was afebrile and tachycardic to 120s;  Lactate wnl.  initially. UA unremarkable.  Etiologies considered most likely included  acute infection and/or medication-induced (bactrim, cellcept and valcyte). Consequently, his home medications of cellcept, valcycte and bactrim were held on admission. Transplant ID was consulted. Infection managed as above. He was given neuopogen on 5/1 and 5/2. Ultimately, he recovered. Started back bactrim and immuno suppression. Opted to continue to hold valcyte for now while Everton was getting weekly CMV titers drawn.    Follow up: ID 5/28; Heart Transplant team next week        #. Abdominal pain  #. Non-bloody diarrhea   Lactate wnl. Pain predominantly epigastric, RUQ and LLQ on exam. Lipase wnl.  "Considered CMV colitis, C.diff, EBV, UC flare, typhilitis contributing to diarrhea and abdominal pain. CT A/P shows nonspecific small area of poorly defined heterogeneous posterior right hepatic parenchyma possibly secondary to the hepatic fibrosis. C. Diff and enteric panel negative. CMV and EBV PCR 4/29 negative. He was maintained on PTA UC treatment. Ultimately, this resolved during admission.      #. NICM s/p OHT (2/24/2019) with RV dysfunction  Most recent biopsy 4/24/2019 with 1 R mild focal acute cellular rejection with no AMR. Bactrim and Valcyte held since 4/26/2019 due to leukopenia. Serostatus: CMV+, EBV+, HSV1+, HSV2 neg, VZV+.   Given neutropenia, cellcept, valcyte, bactrim held on admission. Continued on tacro and prednisone. Tacro levels and CMV titers monitored.   His fluid status was monitored; he required more aggressive IV diuresis, but then was able to be transitioned to PTA bumex. He was stable on this for 3 days prior to discharge.       Discharge Immunosuppresion: MMF (1000mg BID), tacro 2.5mg BID, prednisone 5mg                       PPX: bactrim DS three times weekly   Held medications: valcyte        #. Ulcerative colitis: continued pta home balsalazide 1500mg BID  #. Hypothyroidism: continued pta levothyroxine   #. GERD and history of Pierce's esophagus: continue omeprazole   #. Depression: continue duloxetine   #. Seasonal allergies: azelastine 0.1% nasal spray, fluticasone, montelukast   #. Insomnia: continue melatonin       Physical Exam on day of Discharge:  Blood pressure 123/89, pulse 96, temperature 98.7  F (37.1  C), temperature source Oral, resp. rate 24, height 1.727 m (5' 8\"), weight 56 kg (123 lb 6.4 oz), SpO2 98 %.    General: Resting in chair at bedside, NAD  HEENT: anicteric sclera, PERRL, EOMI,  CV: tachycardic but regular   Lungs: Normal effort on room air. CTAB, no wheezing/crackles, no cough  Abd: soft, NT, ND   Ext: WWP, no significant LE edema  Skin: no rashes, " cyanosis, or jaundice  Neuro: alert, oriented. Speech fluent and articulate. No facial asymmetry or lateralizing deficits.     Lab Studies on Day of Discharge:   Lab Results   Component Value Date    WBC 6.3 05/08/2019     Lab Results   Component Value Date    RBC 3.07 05/08/2019     Lab Results   Component Value Date    HGB 9.0 05/08/2019     Lab Results   Component Value Date    HCT 28.7 05/08/2019     No components found for: MCT  Lab Results   Component Value Date    MCV 94 05/08/2019     Lab Results   Component Value Date    MCH 29.3 05/08/2019     Lab Results   Component Value Date    MCHC 31.4 05/08/2019     Lab Results   Component Value Date    RDW 16.5 05/08/2019     Lab Results   Component Value Date     05/08/2019     Last Comprehensive Metabolic Panel:  Sodium   Date Value Ref Range Status   05/08/2019 137 133 - 144 mmol/L Final     Potassium   Date Value Ref Range Status   05/08/2019 3.4 3.4 - 5.3 mmol/L Final     Chloride   Date Value Ref Range Status   05/08/2019 102 94 - 109 mmol/L Final     Carbon Dioxide   Date Value Ref Range Status   05/08/2019 26 20 - 32 mmol/L Final     Anion Gap   Date Value Ref Range Status   05/08/2019 10 3 - 14 mmol/L Final     Glucose   Date Value Ref Range Status   05/08/2019 96 70 - 99 mg/dL Final     Urea Nitrogen   Date Value Ref Range Status   05/08/2019 20 7 - 30 mg/dL Final     Creatinine   Date Value Ref Range Status   05/08/2019 1.33 (H) 0.66 - 1.25 mg/dL Final     GFR Estimate   Date Value Ref Range Status   05/08/2019 59 (L) >60 mL/min/[1.73_m2] Final     Comment:     Non  GFR Calc  Starting 12/18/2018, serum creatinine based estimated GFR (eGFR) will be   calculated using the Chronic Kidney Disease Epidemiology Collaboration   (CKD-EPI) equation.       Calcium   Date Value Ref Range Status   05/08/2019 8.4 (L) 8.5 - 10.1 mg/dL Final              Procedures & Significant Findings:   MICROBIOLOGY  4/29/2019        Blood Culture - 1/2  Pseudomonas (on first day)   Urine Culture - NGTD   Enteric Panel - negative   C Diff PCR - negative   RVP - negative     4/30/2019   Blood culture - 2/2 NGTD   Histoplasma Agn Blood pending   Aspergillus galactomannin antigen- negative    1,3 beta d glucan fungitell - positive     4/30/2019  BAL   131 WBC, 1% Neutro, 1% Lymph, 98% macro/mono   Culture - light growth pseudomonas   Aspergillus galactomannin negative   Nocardia - no growth after nine days   Fungal - preliminary; no growth after one week    AFB - negative   Light growth actinomyces meyeri     5/1/2019                 Blood Culture - NGTD    5/2/2019    Blood Culture - NGTD       4/29/2019 CT Chest  IMPRESSION:   In this patient with history of heart transplantation,  1. New diffuse bilateral solid with peripheral groundglass pulmonary  nodules/opacities and  mediastinal adenopathy. Constellation of  findings are highly suspicious for infection (typical or atypical  infection) in this immune suppressed patient.  2. New small ascites.  3 Nonspecific small area of poorly defined heterogeneous posterior  right hepatic parenchyma. This may be secondary to the hepatic  fibrosis. Attention on follow-up examination.  4. New compression deformity of T12 vertebral body, since 3/19/2019.    4/29/2019 MR Thoracic Spine  Impression:  1. Mild superior endplate compression deformity of the L1 vertebral  body.  2. Normal variant transitional anatomy with lumbarization of S1.  3. Partially visualized right pleural effusion and multiple lung  nodules. See report for recent body CT examination.    4/30/2019 Bronchoscopy   Findings:        Left Lung Abnormalities: Pseudomembranes were found in the superior        lingula segment of the left upper lobe (B4).        The remainder of the tracheobronchial tree is normal.        Bronchoalveolar lavage was performed in the EUSEBIO anterior segment (B3) of        the lung and sent for cell count, bacterial culture, viral smears &         culture, and fungal & AFB analysis and cytology. 120 mL of fluid were        instilled. 55. The return was cellular.        Washings were obtained EUSEBIO Lingula and sent for cell count, bacterial        culture, viral smears & culture, and fungal & AFB analysis and cytology        and bacterial, AFB and fungal analysis. The return was cloudy.   Impression:           - Multiple pulmonary nodules                         - Immune compromised with pneumonia                         - Bronchoalveolar lavage was performed.                         - Washings were obtained.                         - Friable mucosa was found in the left upper lobe.                         - A plaque was found in the superior lingula segment of                         the left upper lobe.   Recommendation:       - Await BAL and washing results.                         - [facility].          Consultations:     Infectious Disease  Pulmonology         Discharge Medications:     Discharge Medication List as of 5/8/2019  5:19 PM      START taking these medications    Details   ceFEPIme (MAXIPIME) 2 G vial Inject 2 g into the vein every 12 hours for 28 days, Disp-1120 mL, R-0, Local Print      micafungin 150 mg Inject 150 mg into the vein every 24 hours for 28 days, Disp-1 Bag, R-3, Local Print         CONTINUE these medications which have CHANGED    Details   mycophenolate (GENERIC EQUIVALENT) 250 MG capsule Take 4 capsules (1,000 mg) by mouth 2 times daily, Disp-180 capsule, R-11, Historical      potassium chloride ER (K-DUR/KLOR-CON M) 20 MEQ CR tablet Take 1 tablet (20 mEq) by mouth 2 times daily, Disp-120 tablet, R-0, Historical      sulfamethoxazole-trimethoprim (BACTRIM DS/SEPTRA DS) 800-160 MG tablet Three times weekly: Monday, Wednesday, Friday, Disp-10 tablet, R-0, Historical      tacrolimus (GENERIC EQUIVALENT) 0.5 MG capsule Take one 0.5 mg capsule by mouth twice daily (will take total of 2.5mg  in the morning and total of 2.5mg in the evening), Historical      tacrolimus (GENERIC EQUIVALENT) 1 MG capsule Take 2.5 mg by mouth  (2 x 1 mg + 1 x 0.5 mg) in the morning and  in the evening, Historical      valGANciclovir (VALCYTE) 450 MG tablet Take 1 tablet (450 mg) by mouth daily, Disp-30 tablet, R-3, E-Prescribe         CONTINUE these medications which have NOT CHANGED    Details   acetaminophen (TYLENOL) 325 MG tablet Take 2 tablets (650 mg) by mouth every 4 hours as needed for mild pain, Transitional      albuterol (PROAIR HFA/PROVENTIL HFA/VENTOLIN HFA) 108 (90 Base) MCG/ACT inhaler Inhale 2 puffs into the lungs every 6 hours as needed for shortness of breath / dyspnea or wheezing, Historical      aspirin (ASA) 81 MG chewable tablet Take 1 tablet (81 mg) by mouth daily, Transitional      balsalazide (COLAZAL) 750 MG capsule TAKE 2 CAPSULES BY MOUTH TWICE DAILY, Historical      bumetanide (BUMEX) 2 MG tablet Take 1 tablet (2 mg) by mouth 2 times daily, Disp-60 tablet, R-0, E-Prescribe      calcium carbonate (TUMS) 500 MG chewable tablet Take 1 tablet (500 mg) by mouth daily as needed for heartburn, Transitional      calcium carbonate 600 mg-vitamin D 400 units (CALTRATE) 600-400 MG-UNIT per tablet Take 1 tablet by mouth 2 times daily (with meals), Transitional      DULoxetine (CYMBALTA) 20 MG EC capsule Take 20 mg by mouth daily, Historical      fluticasone (FLONASE) 50 MCG/ACT nasal spray Spray 1 spray into both nostrils 2 times daily, Historical      fluticasone (FLOVENT HFA) 220 MCG/ACT Inhaler Inhale 2 puffs into the lungs 2 times daily, Historical      levothyroxine (SYNTHROID/LEVOTHROID) 100 MCG tablet Take 100 mcg by mouth daily , Historical      magnesium oxide (MAG-OX) 400 MG tablet Take 1 tablet (400 mg) by mouth 2 times daily, Disp-180 tablet, R-3, E-PrescribeIncrease in dose      melatonin 3 MG tablet Take 2 tablets (6 mg) by mouth At Bedtime, Transitional      montelukast (SINGULAIR) 10 MG  tablet Take 1 tablet (10 mg) by mouth At Bedtime, Historical      multivitamin w/minerals (THERA-VIT-M) tablet Take 1 tablet by mouth daily, OTC      nystatin (MYCOSTATIN) 578035 UNIT/ML suspension Take 5 mLs (500,000 Units) by mouth 4 times dailyDisp-600 mL, [id number]      omeprazole (PRILOSEC) 40 MG DR capsule Take 1 capsule (40 mg) by mouth daily, Historical      predniSONE (DELTASONE) 5 MG tablet Take 5 mg by mouth 2 times daily., Disp-90 tablet, R-1, E-PrescribePlease include the quantity of tablets and (strength) per dose in sig.      rosuvastatin (CRESTOR) 10 MG tablet Take 1 tablet (10 mg) by mouth daily, Disp-30 tablet, R-0, E-Prescribe      senna (SENOKOT) 8.6 MG tablet Take 2 tablets by mouth 2 times daily as needed for constipation, Historical         STOP taking these medications       azelastine (ASTELIN) 0.1 % nasal spray Comments:   Reason for Stopping:                    Discharge Instructions and Follow-Up:     Discharge Procedure Orders   CT Chest w & w/o contrast   Standing Status: Future Standing Exp. Date: 08/06/19   Order Comments: Need to have before ID appointment on 5/28   Range between 5/24 and 5/27 pending availability     Order Specific Question Answer Comments   Priority Routine      Comprehensive metabolic panel   Standing Status: Standing Number of Occurrences: 8 Standing Exp. Date: 07/08/19   Order Comments: Weekly     CBC with platelets differential   Standing Status: Standing Number of Occurrences: 8 Standing Exp. Date: 05/08/20   Order Comments: Weekly     Last Lab Result: Hemoglobin (g/dL)       Date                     Value                 05/08/2019               9.0 (L)          ----------     CMV DNA quantification   Standing Status: Standing Number of Occurrences: 8 Standing Exp. Date: 05/08/20   Scheduling Instructions: Weekly     Medication Therapy Management Referral   Referral Priority: Routine Referral Type: Med Therapy Management   Number of Visits Requested: 1      Follow Up and recommended labs and tests   Order Comments: ______________________________________________________    [facility]  [address] [place] [address]  Phone: [phone]  Fax: [phone]      [facility]   [address] [place], [place] [id number]    For Micafungin 150 mg IV every 24 hours, Cefepime 2 Gm IV every 12 hours (on a pump) and PICC line care.   Starting on 5/9/19 at 11 AM at [facility]. See Patient Itinerary Form for the rest of your appointment schedule.   Weekly labs: CBC with platelets differential, CMV DNA quantification, CMP.   ______________________________________________________     Reason for your hospital stay   Order Comments: Pseudomonas Bacteremia     Follow Up and recommended labs and tests   Order Comments: Weekly CBC with platelets and differential, weekly CMP, weekly CMV PCR - to be done at [facility]     Activity   Order Comments: Your activity upon discharge: activity as tolerated    Okay to drive.     Order Specific Question Answer Comments   Is discharge order? Yes      Monitor and record   Order Comments: Daily weights     Discharge Instructions     Adult [facility] Follow-up and recommended labs and tests   Order Comments: Follow up with Cardiology early next week; we have messaged the Transplant Coordinators. If you do not hear from them, PLEASE CALL (you will need labs to check your tacrolimus levels and your electrolytes)     Keep your follow up with Infectious Disease, already scheduled for later this month     Appointments on [place] and/or [facility] (with [facility] or [facility] provider or service). Call [phone] if you haven't heard regarding these appointments within 7 days of discharge.     Diet   Order Comments: Follow this diet upon discharge: Orders Placed This Encounter      Fluid restriction 2000 ML FLUID      2 Gram Sodium Diet     Order Specific Question Answer  Comments   Is discharge order? Yes       Access:  RAJI PICC  Placed 5/8/2019         Discharge Disposition:   Good         Condition on Discharge:     Discharge condition: Stable   Code status on discharge: Full Code            The patient was discussed with Dr. Sherman.    60 minutes spent in discharge, including >50% in counseling and coordination of care, medication review and plan of care recommended on follow up. Questions were answered.     Peri Miller [facility]  p [phone]

## 2019-05-11 ENCOUNTER — INFUSION - HEALTHEAST (OUTPATIENT)
Dept: INFUSION THERAPY | Facility: CLINIC | Age: 54
End: 2019-05-11

## 2019-05-11 DIAGNOSIS — R91.8 PULMONARY NODULES: ICD-10-CM

## 2019-05-11 DIAGNOSIS — A41.52 SEPSIS DUE TO PSEUDOMONAS AERUGINOSA (H): ICD-10-CM

## 2019-05-12 ENCOUNTER — INFUSION - HEALTHEAST (OUTPATIENT)
Dept: INFUSION THERAPY | Facility: CLINIC | Age: 54
End: 2019-05-12

## 2019-05-12 DIAGNOSIS — A41.52 SEPSIS DUE TO PSEUDOMONAS AERUGINOSA (H): ICD-10-CM

## 2019-05-12 DIAGNOSIS — R91.8 PULMONARY NODULES: ICD-10-CM

## 2019-05-13 ENCOUNTER — AMBULATORY - HEALTHEAST (OUTPATIENT)
Dept: CARDIAC REHAB | Facility: HOSPITAL | Age: 54
End: 2019-05-13

## 2019-05-13 ENCOUNTER — PRE VISIT (OUTPATIENT)
Dept: TRANSPLANT | Facility: CLINIC | Age: 54
End: 2019-05-13

## 2019-05-13 ENCOUNTER — INFUSION - HEALTHEAST (OUTPATIENT)
Dept: INFUSION THERAPY | Facility: CLINIC | Age: 54
End: 2019-05-13

## 2019-05-13 DIAGNOSIS — R91.8 PULMONARY NODULES: ICD-10-CM

## 2019-05-13 DIAGNOSIS — Z94.1 TRANSPLANTED HEART (H): Primary | ICD-10-CM

## 2019-05-13 DIAGNOSIS — A41.52 SEPSIS DUE TO PSEUDOMONAS AERUGINOSA (H): ICD-10-CM

## 2019-05-13 DIAGNOSIS — Z94.1 STATUS POST HEART TRANSPLANTATION (H): ICD-10-CM

## 2019-05-13 RX ORDER — CEFEPIME HYDROCHLORIDE 2 G/1
2 INJECTION, POWDER, FOR SOLUTION INTRAVENOUS EVERY 12 HOURS
Status: CANCELLED
Start: 2019-05-15

## 2019-05-13 RX ORDER — HEPARIN SODIUM (PORCINE) LOCK FLUSH IV SOLN 100 UNIT/ML 100 UNIT/ML
5 SOLUTION INTRAVENOUS
Status: CANCELLED | OUTPATIENT
Start: 2019-05-15

## 2019-05-13 RX ORDER — HEPARIN SODIUM,PORCINE 10 UNIT/ML
5 VIAL (ML) INTRAVENOUS
Status: CANCELLED | OUTPATIENT
Start: 2019-05-15

## 2019-05-14 ENCOUNTER — INFUSION - HEALTHEAST (OUTPATIENT)
Dept: INFUSION THERAPY | Facility: CLINIC | Age: 54
End: 2019-05-14

## 2019-05-14 DIAGNOSIS — A41.52 SEPSIS DUE TO PSEUDOMONAS AERUGINOSA (H): ICD-10-CM

## 2019-05-14 DIAGNOSIS — R91.8 PULMONARY NODULES: ICD-10-CM

## 2019-05-15 ENCOUNTER — INFUSION - HEALTHEAST (OUTPATIENT)
Dept: INFUSION THERAPY | Facility: CLINIC | Age: 54
End: 2019-05-15

## 2019-05-15 ENCOUNTER — OFFICE VISIT (OUTPATIENT)
Dept: CARDIOLOGY | Facility: CLINIC | Age: 54
End: 2019-05-15
Attending: NURSE PRACTITIONER
Payer: COMMERCIAL

## 2019-05-15 ENCOUNTER — RECORDS - HEALTHEAST (OUTPATIENT)
Dept: ADMINISTRATIVE | Facility: OTHER | Age: 54
End: 2019-05-15

## 2019-05-15 ENCOUNTER — INFUSION THERAPY VISIT (OUTPATIENT)
Dept: INFUSION THERAPY | Facility: CLINIC | Age: 54
End: 2019-05-15
Attending: INTERNAL MEDICINE
Payer: COMMERCIAL

## 2019-05-15 ENCOUNTER — TELEPHONE (OUTPATIENT)
Dept: TRANSPLANT | Facility: CLINIC | Age: 54
End: 2019-05-15

## 2019-05-15 VITALS
HEART RATE: 102 BPM | SYSTOLIC BLOOD PRESSURE: 130 MMHG | BODY MASS INDEX: 18.38 KG/M2 | OXYGEN SATURATION: 99 % | WEIGHT: 121.3 LBS | DIASTOLIC BLOOD PRESSURE: 88 MMHG | HEIGHT: 68 IN

## 2019-05-15 DIAGNOSIS — B49 FUNGAL PNEUMONIA: ICD-10-CM

## 2019-05-15 DIAGNOSIS — R91.8 PULMONARY NODULES: ICD-10-CM

## 2019-05-15 DIAGNOSIS — Z94.1 HEART REPLACED BY TRANSPLANT (H): Primary | ICD-10-CM

## 2019-05-15 DIAGNOSIS — B96.5 BACTEREMIA DUE TO PSEUDOMONAS: ICD-10-CM

## 2019-05-15 DIAGNOSIS — D84.9 IMMUNOSUPPRESSION (H): ICD-10-CM

## 2019-05-15 DIAGNOSIS — J16.8 FUNGAL PNEUMONIA: ICD-10-CM

## 2019-05-15 DIAGNOSIS — A41.52 SEPSIS DUE TO PSEUDOMONAS AERUGINOSA (H): ICD-10-CM

## 2019-05-15 DIAGNOSIS — Z94.1 TRANSPLANTED HEART (H): ICD-10-CM

## 2019-05-15 DIAGNOSIS — Z94.1 S/P ORTHOTOPIC HEART TRANSPLANT (H): Primary | ICD-10-CM

## 2019-05-15 DIAGNOSIS — R78.81 BACTEREMIA DUE TO PSEUDOMONAS: ICD-10-CM

## 2019-05-15 PROBLEM — I47.20 VENTRICULAR TACHYCARDIA (H): Status: RESOLVED | Noted: 2018-10-04 | Resolved: 2019-05-15

## 2019-05-15 PROBLEM — Z95.810 ICD (IMPLANTABLE CARDIOVERTER-DEFIBRILLATOR) IN PLACE: Chronic | Status: RESOLVED | Noted: 2018-10-04 | Resolved: 2019-05-15

## 2019-05-15 PROBLEM — I50.22 CHRONIC HFREF (HEART FAILURE WITH REDUCED EJECTION FRACTION) (H): Status: RESOLVED | Noted: 2019-02-17 | Resolved: 2019-05-15

## 2019-05-15 PROBLEM — I50.9 HEART FAILURE, CHRONIC, WITH ACUTE DECOMPENSATION (H): Status: RESOLVED | Noted: 2018-11-15 | Resolved: 2019-05-15

## 2019-05-15 PROBLEM — Z79.899 ON AMIODARONE THERAPY: Chronic | Status: RESOLVED | Noted: 2018-10-04 | Resolved: 2019-05-15

## 2019-05-15 PROBLEM — I42.8 NONISCHEMIC CARDIOMYOPATHY (H): Chronic | Status: RESOLVED | Noted: 2018-10-04 | Resolved: 2019-05-15

## 2019-05-15 PROBLEM — I50.22 CHRONIC SYSTOLIC HEART FAILURE (H): Chronic | Status: RESOLVED | Noted: 2018-10-04 | Resolved: 2019-05-15

## 2019-05-15 PROBLEM — R50.81 NEUTROPENIC FEVER (H): Status: RESOLVED | Noted: 2019-04-29 | Resolved: 2019-05-15

## 2019-05-15 PROBLEM — I50.9 HEART FAILURE (H): Status: RESOLVED | Noted: 2019-02-25 | Resolved: 2019-05-15

## 2019-05-15 PROBLEM — Z86.79 S/P ABLATION OF ATRIAL FIBRILLATION: Chronic | Status: RESOLVED | Noted: 2018-10-04 | Resolved: 2019-05-15

## 2019-05-15 PROBLEM — D70.9 NEUTROPENIC FEVER (H): Status: RESOLVED | Noted: 2019-04-29 | Resolved: 2019-05-15

## 2019-05-15 PROBLEM — Z98.890 S/P ABLATION OF ATRIAL FIBRILLATION: Chronic | Status: RESOLVED | Noted: 2018-10-04 | Resolved: 2019-05-15

## 2019-05-15 PROBLEM — Z79.2 PROPHYLACTIC ANTIBIOTIC: Status: RESOLVED | Noted: 2019-03-24 | Resolved: 2019-05-15

## 2019-05-15 PROBLEM — J15.0: Status: RESOLVED | Noted: 2019-03-24 | Resolved: 2019-05-15

## 2019-05-15 PROBLEM — I48.0 PAROXYSMAL ATRIAL FIBRILLATION (H): Chronic | Status: RESOLVED | Noted: 2018-10-04 | Resolved: 2019-05-15

## 2019-05-15 PROBLEM — I50.23 ACUTE ON CHRONIC SYSTOLIC CONGESTIVE HEART FAILURE (H): Status: RESOLVED | Noted: 2019-02-08 | Resolved: 2019-05-15

## 2019-05-15 LAB
ALBUMIN SERPL-MCNC: 3.7 G/DL (ref 3.4–5)
ALP SERPL-CCNC: 127 U/L (ref 40–150)
ALT SERPL W P-5'-P-CCNC: 22 U/L (ref 0–70)
ANION GAP SERPL CALCULATED.3IONS-SCNC: 8 MMOL/L (ref 3–14)
ANISOCYTOSIS BLD QL SMEAR: SLIGHT
AST SERPL W P-5'-P-CCNC: 22 U/L (ref 0–45)
BASOPHILS # BLD AUTO: 0 10E9/L (ref 0–0.2)
BASOPHILS NFR BLD AUTO: 0 %
BILIRUB SERPL-MCNC: 0.4 MG/DL (ref 0.2–1.3)
BUN SERPL-MCNC: 58 MG/DL (ref 7–30)
BURR CELLS BLD QL SMEAR: SLIGHT
CALCIUM SERPL-MCNC: 10.1 MG/DL (ref 8.5–10.1)
CHLORIDE SERPL-SCNC: 100 MMOL/L (ref 94–109)
CO2 SERPL-SCNC: 24 MMOL/L (ref 20–32)
CREAT SERPL-MCNC: 2.17 MG/DL (ref 0.66–1.25)
CRP SERPL-MCNC: <2.9 MG/L (ref 0–8)
DIFFERENTIAL METHOD BLD: ABNORMAL
EOSINOPHIL # BLD AUTO: 0.1 10E9/L (ref 0–0.7)
EOSINOPHIL NFR BLD AUTO: 0.9 %
ERYTHROCYTE [DISTWIDTH] IN BLOOD BY AUTOMATED COUNT: 16.7 % (ref 10–15)
GFR SERPL CREATININE-BSD FRML MDRD: 33 ML/MIN/{1.73_M2}
GLUCOSE SERPL-MCNC: 82 MG/DL (ref 70–99)
HCT VFR BLD AUTO: 35.1 % (ref 40–53)
HGB BLD-MCNC: 11 G/DL (ref 13.3–17.7)
LYMPHOCYTES # BLD AUTO: 2 10E9/L (ref 0.8–5.3)
LYMPHOCYTES NFR BLD AUTO: 20.9 %
MAGNESIUM SERPL-MCNC: 1.8 MG/DL (ref 1.6–2.3)
MCH RBC QN AUTO: 30.2 PG (ref 26.5–33)
MCHC RBC AUTO-ENTMCNC: 31.3 G/DL (ref 31.5–36.5)
MCV RBC AUTO: 96 FL (ref 78–100)
MONOCYTES # BLD AUTO: 0.7 10E9/L (ref 0–1.3)
MONOCYTES NFR BLD AUTO: 7 %
NEUTROPHILS # BLD AUTO: 6.8 10E9/L (ref 1.6–8.3)
NEUTROPHILS NFR BLD AUTO: 71.2 %
PHOSPHATE SERPL-MCNC: 3.8 MG/DL (ref 2.5–4.5)
PLATELET # BLD AUTO: 376 10E9/L (ref 150–450)
PLATELET # BLD EST: ABNORMAL 10*3/UL
POIKILOCYTOSIS BLD QL SMEAR: SLIGHT
POTASSIUM SERPL-SCNC: 5 MMOL/L (ref 3.4–5.3)
PROT SERPL-MCNC: 7.6 G/DL (ref 6.8–8.8)
RBC # BLD AUTO: 3.64 10E12/L (ref 4.4–5.9)
SODIUM SERPL-SCNC: 132 MMOL/L (ref 133–144)
TACROLIMUS BLD-MCNC: 8.8 UG/L (ref 5–15)
TME LAST DOSE: NORMAL H
WBC # BLD AUTO: 9.6 10E9/L (ref 4–11)

## 2019-05-15 PROCEDURE — 36415 COLL VENOUS BLD VENIPUNCTURE: CPT | Performed by: INTERNAL MEDICINE

## 2019-05-15 PROCEDURE — 36593 DECLOT VASCULAR DEVICE: CPT

## 2019-05-15 PROCEDURE — 85025 COMPLETE CBC W/AUTO DIFF WBC: CPT | Performed by: INTERNAL MEDICINE

## 2019-05-15 PROCEDURE — 86140 C-REACTIVE PROTEIN: CPT | Performed by: INTERNAL MEDICINE

## 2019-05-15 PROCEDURE — 80197 ASSAY OF TACROLIMUS: CPT | Performed by: INTERNAL MEDICINE

## 2019-05-15 PROCEDURE — 25800030 ZZH RX IP 258 OP 636: Mod: ZF | Performed by: INTERNAL MEDICINE

## 2019-05-15 PROCEDURE — 96365 THER/PROPH/DIAG IV INF INIT: CPT

## 2019-05-15 PROCEDURE — 25000128 H RX IP 250 OP 636: Mod: ZF | Performed by: INTERNAL MEDICINE

## 2019-05-15 PROCEDURE — G0463 HOSPITAL OUTPT CLINIC VISIT: HCPCS | Mod: 25,ZF

## 2019-05-15 PROCEDURE — 99214 OFFICE O/P EST MOD 30 MIN: CPT | Mod: ZP | Performed by: NURSE PRACTITIONER

## 2019-05-15 PROCEDURE — 80053 COMPREHEN METABOLIC PANEL: CPT | Performed by: INTERNAL MEDICINE

## 2019-05-15 RX ORDER — HEPARIN SODIUM,PORCINE 10 UNIT/ML
5 VIAL (ML) INTRAVENOUS
Status: CANCELLED | OUTPATIENT
Start: 2019-05-15

## 2019-05-15 RX ORDER — HEPARIN SODIUM (PORCINE) LOCK FLUSH IV SOLN 100 UNIT/ML 100 UNIT/ML
5 SOLUTION INTRAVENOUS
Status: CANCELLED | OUTPATIENT
Start: 2019-05-15

## 2019-05-15 RX ORDER — CEFEPIME HYDROCHLORIDE 2 G/1
2 INJECTION, POWDER, FOR SOLUTION INTRAVENOUS EVERY 12 HOURS
Status: CANCELLED
Start: 2019-05-15

## 2019-05-15 RX ORDER — CEFEPIME HYDROCHLORIDE 2 G/1
2 INJECTION, POWDER, FOR SOLUTION INTRAVENOUS EVERY 12 HOURS
Status: DISCONTINUED | OUTPATIENT
Start: 2019-05-15 | End: 2019-05-15 | Stop reason: HOSPADM

## 2019-05-15 RX ADMIN — ALTEPLASE 2 MG: 2.2 INJECTION, POWDER, LYOPHILIZED, FOR SOLUTION INTRAVENOUS at 15:00

## 2019-05-15 RX ADMIN — MICAFUNGIN SODIUM 150 MG: 10 INJECTION, POWDER, LYOPHILIZED, FOR SOLUTION INTRAVENOUS at 14:04

## 2019-05-15 ASSESSMENT — PAIN SCALES - GENERAL: PAINLEVEL: NO PAIN (0)

## 2019-05-15 ASSESSMENT — MIFFLIN-ST. JEOR: SCORE: 1354.71

## 2019-05-15 NOTE — TELEPHONE ENCOUNTER
Call placed to infusion center and cefepim orders clarified and discontinued. Pt has to get bag changed at [facility] where contiunous pump was started. Micafungin infusion still setup to be given here today.

## 2019-05-15 NOTE — LETTER
5/15/2019      RE: Everton Larios  [address] [id number]       Dear Colleague,    Thank you for the opportunity to participate in the care of your patient, Everton Larios, at the [facility] at [facility]. Please see a copy of my visit note below.    [facility]    HPI:   Mr. Larios is a 54 year old male who presents to clinic today for routine heart transplant follow-up. Patient has history of ASD (s/p repair in childhood), AFib/AF (s/p ablation), NICM, diastolic dysfunction, alcohol abuse, Pierce's esophagus, ulcerative colitis, GIB (s/p splenic embolization), and hypothyroidism, now s/p orthotopic heart transplant and bypass of persistent left SVC to right atrial appendage 2/24/19. Postop course c/b shock due to RV dysfunction requiring IABP, small pneumoperitoneum, chest wall hematoma (former ICD site, s/p evacuation and drain placement 3/31/19), HCAP/klebsiella pneumonia, and FRANKLIN (required short-term HD, now recovered). He was transferred to rehab 4/3, and ultimately discharged 4/15. Biopsy 3/25/19 showed mild AMR1 with some staining for c4d. Treatment deferred and repeat biopsy 3/28 showed improved AMR and less reactive capillary staining, and subsequent biopsies have now shown resolution of AMR and improved capillary staining.  Baseline coronary angiogram has been deferred due to renal function. Last echo 3/26 showed moderate concentric wall thickening c/w LVH, with LVEF 60-65% and moderately dilated RV with mild-moderate RV dysfunction. He had a fall in early April and found to have compression fracture. Most recently, patient admitted 4/28-5/9 for fevers, URI symptoms, abdominal pain and diarrhea. Found to be neutropenia with pseudomonas bacteremia. CT chest revealed diffuse bilateral solid with peripheral ground glass pulmonary nodules/opacities and mediastinal adenopathy, bronch with BAL grew pseudomonas.  Transplant ID was consulted. fungitell positive so concern for fungal component as well. He was discharged on IV cefepime and micafungin with plans for repeat chest CT in 4 weeks with transplant ID appt. Valcyte intended to be held but actually was continued on his discharge orders and Bactrim for some reason resumed at 3x weekly instead of twice.     Since hospital discharge patient reports feeling well.  He has had no further fever, chills, nausea, vomiting, diarrhea, abdominal pain.  He continues to work with cardiac rehab but only once a week due to his antibiotic schedule.  Receiving antibiotics at [facility].  Denies any exertional chest pain, shortness of breath, fatigue, lightheadedness.  No orthopnea, PND, endorses mild leg swelling at the end of the day.  He has continued to take Bumex 2 mg twice a day.  Weight is down significantly.  And he endorses dry mouth.  Blood pressures at home on average 130s over 90s.  He has noticed a few high diastolic pressures in cardiac rehab.  Patient otherwise denies night sweats, oral lesions, rashes, lightheadedness, dizziness, headaches, palpitations.    PAST MEDICAL HISTORY:  Past Medical History:   Diagnosis Date     Alcohol abuse      Pierce's esophagus 10/4/2018     Chronic rhinitis 10/4/2018     Chronic systolic heart failure (H) 10/4/2018     Depression 10/4/2018     Diastolic dysfunction      DJD (degenerative joint disease) - neck 10/4/2018     H/O congenital atrial septal defect (ASD) repair at age 3 10/4/2018     Hypothyroidism due to medication 10/4/2018     ICD, [email or web address] 2008; gen change 2/2018 10/4/2018     Intermittent asthma without complication 10/4/2018     Nonischemic cardiomyopathy (H) 10/4/2018     On amiodarone therapy 10/4/2018     Paroxysmal atrial fibrillation (H) 10/4/2018     S/P ablation of atrial fibrillation 10/4/2018     Systolic heart failure (H)      Ulcerative colitis (H) 10/4/2018     Ventricular tachycardia (H) 10/4/2018        FAMILY HISTORY:  Family History   Problem Relation Age of Onset     Endometrial Cancer Mother      Hypertension Father      Endometrial Cancer Maternal Grandmother        SOCIAL HISTORY:  Socioeconomic History     Marital status: Single   Tobacco Use     Smoking status: Former Smoker     Years: 10.00     Smokeless tobacco: Never Used   Substance and Sexual Activity     Alcohol use: Yes     Alcohol/week: 0.6 oz     Types: 1 Cans of beer per week     Comment: a couple times a week      Drug use: No   Social History Narrative    Everton is a retired . He lives by himself in a townhouse in [place]. He has no lawn care responsibilities. He will be staying with his folks during his post-hospital early transplant care time. No history of TB exposures.        CURRENT MEDICATIONS:    Current Outpatient Medications on File Prior to Visit:  acetaminophen (TYLENOL) 325 MG tablet Take 2 tablets (650 mg) by mouth every 4 hours as needed for mild pain   albuterol (PROAIR HFA/PROVENTIL HFA/VENTOLIN HFA) 108 (90 Base) MCG/ACT inhaler Inhale 2 puffs into the lungs every 6 hours as needed for shortness of breath / dyspnea or wheezing   aspirin (ASA) 81 MG chewable tablet Take 1 tablet (81 mg) by mouth daily   balsalazide (COLAZAL) 750 MG capsule TAKE 2 CAPSULES BY MOUTH TWICE DAILY   bumetanide (BUMEX) 2 MG tablet Take 1 tablet (2 mg) by mouth 2 times daily   calcium carbonate (TUMS) 500 MG chewable tablet Take 1 tablet (500 mg) by mouth daily as needed for heartburn   calcium carbonate 600 mg-vitamin D 400 units (CALTRATE) 600-400 MG-UNIT per tablet Take 1 tablet by mouth 2 times daily (with meals)   ceFEPIme (MAXIPIME) 2 G vial Inject 2 g into the vein every 12 hours for 28 days   DULoxetine (CYMBALTA) 20 MG EC capsule Take 20 mg by mouth daily   fluticasone (FLONASE) 50 MCG/ACT nasal spray Spray 1 spray into both nostrils 2 times daily   fluticasone (FLOVENT HFA) 220 MCG/ACT Inhaler Inhale 2 puffs into the  "lungs 2 times daily   levothyroxine (SYNTHROID/LEVOTHROID) 100 MCG tablet Take 100 mcg by mouth daily    magnesium oxide (MAG-OX) 400 MG tablet Take 1 tablet (400 mg) by mouth 2 times daily   melatonin 3 MG tablet Take 2 tablets (6 mg) by mouth At Bedtime   micafungin 150 mg Inject 150 mg into the vein every 24 hours for 28 days   montelukast (SINGULAIR) 10 MG tablet Take 1 tablet (10 mg) by mouth At Bedtime   multivitamin w/minerals (THERA-VIT-M) tablet Take 1 tablet by mouth daily   mycophenolate (GENERIC EQUIVALENT) 250 MG capsule Take 4 capsules (1,000 mg) by mouth 2 times daily   nystatin (MYCOSTATIN) 866398 UNIT/ML suspension Take 5 mLs (500,000 Units) by mouth 4 times daily   omeprazole (PRILOSEC) 40 MG DR capsule Take 1 capsule (40 mg) by mouth daily   potassium chloride ER (K-DUR/KLOR-CON M) 20 MEQ CR tablet Take 1 tablet (20 mEq) by mouth 2 times daily   predniSONE (DELTASONE) 5 MG tablet Take 5 mg by mouth 2 times daily.   rosuvastatin (CRESTOR) 10 MG tablet Take 1 tablet (10 mg) by mouth daily   senna (SENOKOT) 8.6 MG tablet Take 2 tablets by mouth 2 times daily as needed for constipation   sulfamethoxazole-trimethoprim (BACTRIM DS/SEPTRA DS) 800-160 MG tablet Three times weekly: Monday, Wednesday, Friday   tacrolimus (GENERIC EQUIVALENT) 0.5 MG capsule Take one 0.5 mg capsule by mouth twice daily (will take total of 2.5mg in the morning and total of 2.5mg in the evening)   tacrolimus (GENERIC EQUIVALENT) 1 MG capsule Take 2.5 mg by mouth  (2 x 1 mg + 1 x 0.5 mg) in the morning and  in the evening   valGANciclovir (VALCYTE) 450 MG tablet Take 1 tablet (450 mg) by mouth daily     No current facility-administered medications on file prior to visit.     ROS:  See HPI    EXAM:  /88 (BP Location: Left arm, Patient Position: Chair, Cuff Size: Adult Regular)   Pulse 102   Ht 1.727 m (5' 8\")   Wt 55 kg (121 lb 4.8 oz)   SpO2 99%   BMI 18.44 kg/m       GENERAL: Appears comfortable, in no acute " distress, thin.   HEENT: Eye symmetrical, no discharge or icterus bilaterally. Mucous membranes moist and without lesions. No thrush.   CV: Tachycardic and regular, +S1S2, no murmur, rub, or gallop. JVP not visible at 45 degrees.   RESPIRATORY: Respirations regular, even, and unlabored. Lungs CTA throughout, good air movement.  GI: Soft, thin and non distended with normoactive bowel sounds present in all quadrants. No tenderness, rebound, guarding. No hepatomegaly.   EXTREMITIES: Np peripheral edema. 2+ bilateral pedal pulses.   NEUROLOGIC: Alert and oriented x 3. No focal deficits.   MUSCULOSKELETAL: No joint swelling or tenderness.   SKIN: No jaundice. Thin skin noted with small skin tears on hands. Acne consistent with steroid use.     Labs - reviewed with patient in clinic today:  CBC RESULTS:  Lab Results   Component Value Date    WBC 9.6 05/15/2019    RBC 3.64 (L) 05/15/2019    HGB 11.0 (L) 05/15/2019    HCT 35.1 (L) 05/15/2019    MCV 96 05/15/2019    MCH 30.2 05/15/2019    MCHC 31.3 (L) 05/15/2019    RDW 16.7 (H) 05/15/2019     05/15/2019       CMP RESULTS:  Lab Results   Component Value Date     (L) 05/15/2019    POTASSIUM 5.0 05/15/2019    CHLORIDE 100 05/15/2019    CO2 24 05/15/2019    ANIONGAP 8 05/15/2019    GLC 82 05/15/2019    BUN 58 (H) 05/15/2019    CR 2.17 (H) 05/15/2019    GFRESTIMATED 33 (L) 05/15/2019    GFRESTBLACK 38 (L) 05/15/2019    RADAMES 10.1 05/15/2019    BILITOTAL 0.4 05/15/2019    ALBUMIN 3.7 05/15/2019    ALKPHOS 127 05/15/2019    ALT 22 05/15/2019    AST 22 05/15/2019        INR RESULTS:  Lab Results   Component Value Date    INR 1.22 (H) 05/08/2019       LIPID RESULTS:  Lab Results   Component Value Date    CHOL 194 11/15/2018    HDL 42 11/15/2018     (H) 11/15/2018    TRIG 112 11/15/2018       IMMUNOSUPPRESSANT LEVELS:  Lab Results   Component Value Date    TACROL 12.0 05/08/2019    DOSTAC Not Provided 05/08/2019       No components found for: CK  Lab Results    Component Value Date    MAG 2.1 05/08/2019     Lab Results   Component Value Date    A1C 5.7 (H) 02/23/2019     Lab Results   Component Value Date    PHOS 3.6 04/26/2019     Lab Results   Component Value Date    NTBNP 10,986 (H) 11/15/2018     Lab Results   Component Value Date    SAITESTMET [facility] 05/08/2019    SAICELL Class I 05/08/2019    [id number] None 05/08/2019    IW3PLBBBGS None 05/08/2019    SAIREPCOM  05/08/2019      Test performed by modified procedure. Serum heat inactivated and tested   by a modified ([place]) protocol including fetal calf serum addition.   High-risk, mfi >3,000. Mod-risk, mfi 500-3,000.       Lab Results   Component Value Date    SAIITESTME [facility] 05/08/2019    SAIICELL Class II 05/08/2019    [id number] None 05/08/2019    [id number] None 05/08/2019    SAIIREPCOM  05/08/2019      Test performed by modified procedure. Serum heat inactivated and tested   by a modified ([place]) protocol including fetal calf serum addition.   High-risk, mfi >3,000. Mod-risk, mfi 500-3,000.       Lab Results   Component Value Date    CSPEC Plasma, EDTA anticoagulant 05/06/2019       Diagnostic Studies:  RHC 4/24/19  RA  Mean: 15 mmHg     PA  Systolic:47 mmHg  Diasotlic: 25 mmHg  Mean: 35 mmHg    PCW  Mean: 23 mmHg    Cardiac Output  CO Jaden: 4.73 L/min  CI Jaden: 2.91 L/min/m2  CO TD: 4.37 L/min  CI TD: 2.68 L/min/m2    TTE 3/26/19  Moderate concentric wall thickening consistent with left ventricular  hypertrophy is present.  Global and regional left ventricular function is normal with an EF of 60-65%.  Right ventricle is moderately dilated with hypertrophy and systolic  dysfunction that is slightly worse than on 3/5/19.  IVC is dilated and RA pressure estimated 15 mmHg    Assessment/Plan:  Mr. Larios is a 54 year old male who is s/p orthotopic heart transplant on 2/24/19 c/b RV dysfunction requiring IABP and FRANKLIN requiring short term HD who presents to clinic for routine follow-up. Recent post-transplant issues  include pseudomonas bacteremia 2/2 pulmonary source with possible fungal component now on IV cefepime and micafungin. From a pulmonary standpoint, appears stable. He has FRANKLIN today I think is consistent with prerenal so will have him hold Bumex and recheck on Friday. Will also stop KCl and Bactrim for now - but will check w ID. Coordinator to review cefepime dosing with pharmacy today given GFR of 33 today.     # Status post OHT on 2/24/19, history of NICM  # Chronic immunosuppression  * Recent IS changes: lowered due to bacteremia  * Rejection history: questionable AMR findings that resolved without treatment  * Intolerance to medications: none  * Last biopsy: 5/8 1R, weak c4d staining negative c3d *No biopsies or central line access on left due to SVC graft     Immunosuppression:   --did not receive Simulect  --continue prednisone per taper with negative bx, currently  5 mg daily (decreased today due to NER 5/8)  --continue Cellcept 1000 mg BID   --continue tacrolimus goal 8-10, pending today     Prophylaxis:   --CAV: aspirin 81 mg, rosuvastatin 10 mg daily  --Thrush: Nystatin swish and swallow  --PCP: hold Bactrim DS    --GI: omperazole 40 mg   --CMV: D+/R+, valcyte 450 mg daily, ok now that WBC normalized - intended to be held at discharge but wasn't ordered that way  --Osteoporosis: calcium/vitamin D      Serostatus: CMV: D+/R+. EBV: D+/R+     #Recent pseudomonas bacteremia 2/2 pseudomonas pneumonia  #Possible fungal pneumonia  Currently on IV cefepime and micafungin. Symptoms resolved. WBC 9 today from 6-7. Add on CRP, compare to previous. Plan is for repeat chest CT next week and transplant ID visit.     #FRANKLIN on short term iHD post transplant  #FRANKLIN on CKD today, likely prerenal  Hold Bumex today and tomorrow, recheck BMP Friday to decide what to do with Bumex then. Also stop KCl and Bactrim for now. RHC with bx next week.           25 minutes spent face-to-face with patient, >50% in counseling and/or  coordination of care as described above      Candida Srivastava DNP, NP-C  5/15/2019          KALYN SUTTON, TAI CNP

## 2019-05-15 NOTE — PROGRESS NOTES
Nursing Note  Everton Larios [facility] for:   Chief Complaint   Patient presents with     Infusion     Mycamine     During today's [facility] appointment, orders from Dr. Shukla were completed.  Frequency: once    Progress note:  Patient identification verified by name and date of birth.  Assessment completed.  Vitals recorded in Doc Flowsheets.  Patient was provided with education regarding infusion and possible side effects.  Patient verbalized understanding.    Patient came in for Cefeplme and micafungin. Patient could not get Cefeplme due to patient having continuous PIV infusion pump. Their was no blood return noted when PICC was checked. PIV was placed for Micafungin. TPA was ordered for PICC line. Patient to have PICC line assessed and TPA removed when he has his cefeplme connected at [facility] infusion center.    PICC dressing was changed due.     present during visit today: Not Applicable.    Pre Infusion Conditions: non-applicable.    Premedications: were not ordered.        Infusion length and rate:  infusion given over approximately 60 minutes.    Labs: were not ordered for this appointment.    Vascular access: peripheral IV placed today.    Post Infusion Assessment:  Patient tolerated infusion without incident.     Discharge Plan:   Follow up plan of care with: transplant coordinator.  Discharge instructions were reviewed with patient.  Patient/representative verbalized understanding of discharge instructions and all questions answered.  Patient discharged from [facility] in stable condition.    Jory Davis RN    Administrations This Visit     alteplase (CATHFLO ACTIVASE) injection 2 mg     Admin Date  05/15/2019 Action  Given Dose  2 mg Route  Intravenous Administered By  Jory Davis RN          micafungin (MYCAMINE) 150 mg in sodium chloride 0.9 % 100 mL  intermittent infusion     Admin Date  05/15/2019 Action  New Bag Dose  150 mg Rate  125 mL/hr Route  Intravenous Administered By  Jory Davis RN                There were no vitals taken for this visit.

## 2019-05-15 NOTE — TELEPHONE ENCOUNTER
Called infusion center and pharmacy. Orders unclear as patient has continuous cefepime running and additional order for cefepime IVP ordered. Primary Transplant Coordinator updated and orders in process of being clarified for today's infusion appointment.

## 2019-05-15 NOTE — NURSING NOTE
Chief Complaint   Patient presents with     Follow Up     Systolic HF     Vitals were taken and medications were reconciled.     Nicole Camp  10:06 AM

## 2019-05-15 NOTE — TELEPHONE ENCOUNTER
Patient called to report that he has arrived at infusion center and they are unable to change abx bag.

## 2019-05-15 NOTE — NURSING NOTE
Transplant Coordinator Note    Reason for visit: post hosp discharge. 11 weeks post heart transplant  Coordinator: Present       Health concerns addressed today:  1. Rising Cr  2.   3.       Immunosuppressants:  Tacro  Mycophenolate      Labs:             Resulted labs reviewed with patient  Medication record reviewed and reconciled  Questions and concerns addressed  Pt verbalized an understanding of plan of care.     Patient Instructions  1. Decrease prednisone to 5 mg once a day  2. Skip evening dose of bumex tonight and skip tomorrow's dose on 5/16. Restart 1 mg of bumex on 5/17. Get labs and then wait to hear from us on what dose to take moving forward.   3. Stop bactrim  4. Stop potassium  5. I will get CT of lungs scheduled for 5/24.   6. Recheck BMP on 5/17 at [facility].     Next transplant clinic appointment:  5/22 testing and 5/24 with Elin Jensen  Next lab draw: 5/17  Coordinator will call with all pending results.     Please call transplant coordinator with any questions:    Jocelynn Jerez RN BSN   Post Heart Transplant Nurse Coordinator  [facility]  Questions: [phone]

## 2019-05-15 NOTE — PROGRESS NOTES
ADULT HEART TRANSPLANT CLINIC    HPI:   Mr. Larios is a 54 year old male who presents to clinic today for routine heart transplant follow-up. Patient has history of ASD (s/p repair in childhood), AFib/AF (s/p ablation), NICM, diastolic dysfunction, alcohol abuse, Pierce's esophagus, ulcerative colitis, GIB (s/p splenic embolization), and hypothyroidism, now s/p orthotopic heart transplant and bypass of persistent left SVC to right atrial appendage 2/24/19. Postop course c/b shock due to RV dysfunction requiring IABP, small pneumoperitoneum, chest wall hematoma (former ICD site, s/p evacuation and drain placement 3/31/19), HCAP/klebsiella pneumonia, and FRANKLIN (required short-term HD, now recovered). He was transferred to rehab 4/3, and ultimately discharged 4/15. Biopsy 3/25/19 showed mild AMR1 with some staining for c4d. Treatment deferred and repeat biopsy 3/28 showed improved AMR and less reactive capillary staining, and subsequent biopsies have now shown resolution of AMR and improved capillary staining.  Baseline coronary angiogram has been deferred due to renal function. Last echo 3/26 showed moderate concentric wall thickening c/w LVH, with LVEF 60-65% and moderately dilated RV with mild-moderate RV dysfunction. He had a fall in early April and found to have compression fracture. Most recently, patient admitted 4/28-5/9 for fevers, URI symptoms, abdominal pain and diarrhea. Found to be neutropenia with pseudomonas bacteremia. CT chest revealed diffuse bilateral solid with peripheral ground glass pulmonary nodules/opacities and mediastinal adenopathy, bronch with BAL grew pseudomonas. Transplant ID was consulted. fungitell positive so concern for fungal component as well. He was discharged on IV cefepime and micafungin with plans for repeat chest CT in 4 weeks with transplant ID appt. Valcyte intended to be held but actually was continued on his discharge orders and Bactrim for some reason resumed at 3x weekly  instead of twice.     Since hospital discharge patient reports feeling well.  He has had no further fever, chills, nausea, vomiting, diarrhea, abdominal pain.  He continues to work with cardiac rehab but only once a week due to his antibiotic schedule.  Receiving antibiotics at [facility].  Denies any exertional chest pain, shortness of breath, fatigue, lightheadedness.  No orthopnea, PND, endorses mild leg swelling at the end of the day.  He has continued to take Bumex 2 mg twice a day.  Weight is down significantly.  And he endorses dry mouth.  Blood pressures at home on average 130s over 90s.  He has noticed a few high diastolic pressures in cardiac rehab.  Patient otherwise denies night sweats, oral lesions, rashes, lightheadedness, dizziness, headaches, palpitations.    PAST MEDICAL HISTORY:  Past Medical History:   Diagnosis Date     Alcohol abuse      Pierce's esophagus 10/4/2018     Chronic rhinitis 10/4/2018     Chronic systolic heart failure (H) 10/4/2018     Depression 10/4/2018     Diastolic dysfunction      DJD (degenerative joint disease) - neck 10/4/2018     H/O congenital atrial septal defect (ASD) repair at age 3 10/4/2018     Hypothyroidism due to medication 10/4/2018     ICD, 33Across 2008; gen change 2/2018 10/4/2018     Intermittent asthma without complication 10/4/2018     Nonischemic cardiomyopathy (H) 10/4/2018     On amiodarone therapy 10/4/2018     Paroxysmal atrial fibrillation (H) 10/4/2018     S/P ablation of atrial fibrillation 10/4/2018     Systolic heart failure (H)      Ulcerative colitis (H) 10/4/2018     Ventricular tachycardia (H) 10/4/2018       FAMILY HISTORY:  Family History   Problem Relation Age of Onset     Endometrial Cancer Mother      Hypertension Father      Endometrial Cancer Maternal Grandmother        SOCIAL HISTORY:  Socioeconomic History     Marital status: Single   Tobacco Use     Smoking status: Former Smoker     Years: 10.00     Smokeless tobacco: Never  Used   Substance and Sexual Activity     Alcohol use: Yes     Alcohol/week: 0.6 oz     Types: 1 Cans of beer per week     Comment: a couple times a week      Drug use: No   Social History Narrative    Everton is a retired . He lives by himself in a townhouse in [place]. He has no lawn care responsibilities. He will be staying with his folks during his post-hospital early transplant care time. No history of TB exposures.        CURRENT MEDICATIONS:    Current Outpatient Medications on File Prior to Visit:  acetaminophen (TYLENOL) 325 MG tablet Take 2 tablets (650 mg) by mouth every 4 hours as needed for mild pain   albuterol (PROAIR HFA/PROVENTIL HFA/VENTOLIN HFA) 108 (90 Base) MCG/ACT inhaler Inhale 2 puffs into the lungs every 6 hours as needed for shortness of breath / dyspnea or wheezing   aspirin (ASA) 81 MG chewable tablet Take 1 tablet (81 mg) by mouth daily   balsalazide (COLAZAL) 750 MG capsule TAKE 2 CAPSULES BY MOUTH TWICE DAILY   bumetanide (BUMEX) 2 MG tablet Take 1 tablet (2 mg) by mouth 2 times daily   calcium carbonate (TUMS) 500 MG chewable tablet Take 1 tablet (500 mg) by mouth daily as needed for heartburn   calcium carbonate 600 mg-vitamin D 400 units (CALTRATE) 600-400 MG-UNIT per tablet Take 1 tablet by mouth 2 times daily (with meals)   ceFEPIme (MAXIPIME) 2 G vial Inject 2 g into the vein every 12 hours for 28 days   DULoxetine (CYMBALTA) 20 MG EC capsule Take 20 mg by mouth daily   fluticasone (FLONASE) 50 MCG/ACT nasal spray Spray 1 spray into both nostrils 2 times daily   fluticasone (FLOVENT HFA) 220 MCG/ACT Inhaler Inhale 2 puffs into the lungs 2 times daily   levothyroxine (SYNTHROID/LEVOTHROID) 100 MCG tablet Take 100 mcg by mouth daily    magnesium oxide (MAG-OX) 400 MG tablet Take 1 tablet (400 mg) by mouth 2 times daily   melatonin 3 MG tablet Take 2 tablets (6 mg) by mouth At Bedtime   micafungin 150 mg Inject 150 mg into the vein every 24 hours for 28 days  "  montelukast (SINGULAIR) 10 MG tablet Take 1 tablet (10 mg) by mouth At Bedtime   multivitamin w/minerals (THERA-VIT-M) tablet Take 1 tablet by mouth daily   mycophenolate (GENERIC EQUIVALENT) 250 MG capsule Take 4 capsules (1,000 mg) by mouth 2 times daily   nystatin (MYCOSTATIN) 855790 UNIT/ML suspension Take 5 mLs (500,000 Units) by mouth 4 times daily   omeprazole (PRILOSEC) 40 MG DR capsule Take 1 capsule (40 mg) by mouth daily   potassium chloride ER (K-DUR/KLOR-CON M) 20 MEQ CR tablet Take 1 tablet (20 mEq) by mouth 2 times daily   predniSONE (DELTASONE) 5 MG tablet Take 5 mg by mouth 2 times daily.   rosuvastatin (CRESTOR) 10 MG tablet Take 1 tablet (10 mg) by mouth daily   senna (SENOKOT) 8.6 MG tablet Take 2 tablets by mouth 2 times daily as needed for constipation   sulfamethoxazole-trimethoprim (BACTRIM DS/SEPTRA DS) 800-160 MG tablet Three times weekly: Monday, Wednesday, Friday   tacrolimus (GENERIC EQUIVALENT) 0.5 MG capsule Take one 0.5 mg capsule by mouth twice daily (will take total of 2.5mg in the morning and total of 2.5mg in the evening)   tacrolimus (GENERIC EQUIVALENT) 1 MG capsule Take 2.5 mg by mouth  (2 x 1 mg + 1 x 0.5 mg) in the morning and  in the evening   valGANciclovir (VALCYTE) 450 MG tablet Take 1 tablet (450 mg) by mouth daily     No current facility-administered medications on file prior to visit.     ROS:  See HPI    EXAM:  /88 (BP Location: Left arm, Patient Position: Chair, Cuff Size: Adult Regular)   Pulse 102   Ht 1.727 m (5' 8\")   Wt 55 kg (121 lb 4.8 oz)   SpO2 99%   BMI 18.44 kg/m      GENERAL: Appears comfortable, in no acute distress, thin.   HEENT: Eye symmetrical, no discharge or icterus bilaterally. Mucous membranes moist and without lesions. No thrush.   CV: Tachycardic and regular, +S1S2, no murmur, rub, or gallop. JVP not visible at 45 degrees.   RESPIRATORY: Respirations regular, even, and unlabored. Lungs CTA throughout, good air movement.  GI: Soft, " thin and non distended with normoactive bowel sounds present in all quadrants. No tenderness, rebound, guarding. No hepatomegaly.   EXTREMITIES: Np peripheral edema. 2+ bilateral pedal pulses.   NEUROLOGIC: Alert and oriented x 3. No focal deficits.   MUSCULOSKELETAL: No joint swelling or tenderness.   SKIN: No jaundice. Thin skin noted with small skin tears on hands. Acne consistent with steroid use.     Labs - reviewed with patient in clinic today:  CBC RESULTS:  Lab Results   Component Value Date    WBC 9.6 05/15/2019    RBC 3.64 (L) 05/15/2019    HGB 11.0 (L) 05/15/2019    HCT 35.1 (L) 05/15/2019    MCV 96 05/15/2019    MCH 30.2 05/15/2019    MCHC 31.3 (L) 05/15/2019    RDW 16.7 (H) 05/15/2019     05/15/2019       CMP RESULTS:  Lab Results   Component Value Date     (L) 05/15/2019    POTASSIUM 5.0 05/15/2019    CHLORIDE 100 05/15/2019    CO2 24 05/15/2019    ANIONGAP 8 05/15/2019    GLC 82 05/15/2019    BUN 58 (H) 05/15/2019    CR 2.17 (H) 05/15/2019    GFRESTIMATED 33 (L) 05/15/2019    GFRESTBLACK 38 (L) 05/15/2019    RADAMES 10.1 05/15/2019    BILITOTAL 0.4 05/15/2019    ALBUMIN 3.7 05/15/2019    ALKPHOS 127 05/15/2019    ALT 22 05/15/2019    AST 22 05/15/2019        INR RESULTS:  Lab Results   Component Value Date    INR 1.22 (H) 05/08/2019       LIPID RESULTS:  Lab Results   Component Value Date    CHOL 194 11/15/2018    HDL 42 11/15/2018     (H) 11/15/2018    TRIG 112 11/15/2018       IMMUNOSUPPRESSANT LEVELS:  Lab Results   Component Value Date    TACROL 12.0 05/08/2019    DOSTAC Not Provided 05/08/2019       No components found for: CK  Lab Results   Component Value Date    MAG 2.1 05/08/2019     Lab Results   Component Value Date    A1C 5.7 (H) 02/23/2019     Lab Results   Component Value Date    PHOS 3.6 04/26/2019     Lab Results   Component Value Date    NTBNP 10,986 (H) 11/15/2018     Lab Results   Component Value Date    GILDARDO BROWN 05/08/2019    RUBÉN Class I 05/08/2019     [id number] None 05/08/2019    [id number] None 05/08/2019    SAIREPCOM  05/08/2019      Test performed by modified procedure. Serum heat inactivated and tested   by a modified ([place]) protocol including fetal calf serum addition.   High-risk, mfi >3,000. Mod-risk, mfi 500-3,000.       Lab Results   Component Value Date    SAIITESTME SA FCS 05/08/2019    SAIICELL Class II 05/08/2019    [id number] None 05/08/2019    [id number] None 05/08/2019    SAIIREPCOM  05/08/2019      Test performed by modified procedure. Serum heat inactivated and tested   by a modified ([place]) protocol including fetal calf serum addition.   High-risk, mfi >3,000. Mod-risk, mfi 500-3,000.       Lab Results   Component Value Date    CSPEC Plasma, EDTA anticoagulant 05/06/2019       Diagnostic Studies:  RHC 4/24/19  RA  Mean: 15 mmHg     PA  Systolic:47 mmHg  Diasotlic: 25 mmHg  Mean: 35 mmHg    PCW  Mean: 23 mmHg    Cardiac Output  CO Jaden: 4.73 L/min  CI Jaden: 2.91 L/min/m2  CO TD: 4.37 L/min  CI TD: 2.68 L/min/m2    TTE 3/26/19  Moderate concentric wall thickening consistent with left ventricular  hypertrophy is present.  Global and regional left ventricular function is normal with an EF of 60-65%.  Right ventricle is moderately dilated with hypertrophy and systolic  dysfunction that is slightly worse than on 3/5/19.  IVC is dilated and RA pressure estimated 15 mmHg    Assessment/Plan:  Mr. Larios is a 54 year old male who is s/p orthotopic heart transplant on 2/24/19 c/b RV dysfunction requiring IABP and FRANKLIN requiring short term HD who presents to clinic for routine follow-up. Recent post-transplant issues include pseudomonas bacteremia 2/2 pulmonary source with possible fungal component now on IV cefepime and micafungin. From a pulmonary standpoint, appears stable. He has FRANKLIN today I think is consistent with prerenal so will have him hold Bumex and recheck on Friday. Will also stop KCl and Bactrim for now - but will check w ID. Coordinator to  review cefepime dosing with pharmacy today given GFR of 33 today.     # Status post OHT on 2/24/19, history of NICM  # Chronic immunosuppression  * Recent IS changes: lowered due to bacteremia  * Rejection history: questionable AMR findings that resolved without treatment  * Intolerance to medications: none  * Last biopsy: 5/8 1R, weak c4d staining negative c3d *No biopsies or central line access on left due to SVC graft     Immunosuppression:   --did not receive Simulect  --continue prednisone per taper with negative bx, currently 5 mg daily (decreased today due to NER 5/8)  --continue Cellcept 1000 mg BID   --continue tacrolimus goal 8-10, pending today     Prophylaxis:   --CAV: aspirin 81 mg, rosuvastatin 10 mg daily  --Thrush: Nystatin swish and swallow  --PCP: hold Bactrim DS    --GI: omperazole 40 mg   --CMV: D+/R+, valcyte 450 mg daily, ok now that WBC normalized - intended to be held at discharge but wasn't ordered that way  --Osteoporosis: calcium/vitamin D      Serostatus: CMV: D+/R+. EBV: D+/R+     #Recent pseudomonas bacteremia 2/2 pseudomonas pneumonia  #Possible fungal pneumonia  Currently on IV cefepime and micafungin. Symptoms resolved. WBC 9 today from 6-7. Add on CRP, compare to previous. Plan is for repeat chest CT next week and transplant ID visit.     #FRANKLIN on short term iHD post transplant  #FRANKLIN on CKD today, likely prerenal  Hold Bumex today and tomorrow, recheck BMP Friday to decide what to do with Bumex then. Also stop KCl and Bactrim for now. RHC with bx next week.           25 minutes spent face-to-face with patient, >50% in counseling and/or coordination of care as described above      Candida Srivastava DNP, NP-C  5/15/2019          KALYN SUTTON

## 2019-05-15 NOTE — PATIENT INSTRUCTIONS
Patient Instructions  1. Decrease prednisone to 5 mg once a day (negative hbx on 5/8).  2. Skip evening dose of bumex tonight and skip tomorrow's dose on 5/16. Restart 1 mg of bumex on 5/17 (Friday). Get labs and then wait to hear from us on what dose to take moving forward.   3. Stop bactrim  4. Stop potassium  5. I will get CT of lungs scheduled for 5/24.   6. Recheck BMP on 5/17 at [facility].     Next transplant clinic appointment:  5/22 testing and 5/24 with Elin Jensen  Next lab draw: 5/17  Coordinator will call with all pending results.     Please call transplant coordinator with any questions:    Jocelynn Jerez RN BSN   Post Heart Transplant Nurse Coordinator  [facility]  Questions: [phone]

## 2019-05-16 ENCOUNTER — TELEPHONE (OUTPATIENT)
Dept: TRANSPLANT | Facility: CLINIC | Age: 54
End: 2019-05-16

## 2019-05-16 ENCOUNTER — INFUSION - HEALTHEAST (OUTPATIENT)
Dept: INFUSION THERAPY | Facility: CLINIC | Age: 54
End: 2019-05-16

## 2019-05-16 DIAGNOSIS — A41.52 SEPSIS DUE TO PSEUDOMONAS AERUGINOSA (H): ICD-10-CM

## 2019-05-16 DIAGNOSIS — R91.8 PULMONARY NODULES: ICD-10-CM

## 2019-05-16 DIAGNOSIS — Z94.1 HEART REPLACED BY TRANSPLANT (H): ICD-10-CM

## 2019-05-16 NOTE — TELEPHONE ENCOUNTER
Patient Call: General  Route to LPN    Reason for call: Patient called to inquire about his canceled infusions. He did not cancel them.    Call back needed? Yes    Return Call Needed  Same as documented in contacts section  When to return call?: Same day: Route High Priority

## 2019-05-16 NOTE — TELEPHONE ENCOUNTER
CMV negative. [place] 8.8. Goal 8-10. Dose 3/2. No changes at this time. Please call with any questions.

## 2019-05-17 ENCOUNTER — INFUSION - HEALTHEAST (OUTPATIENT)
Dept: INFUSION THERAPY | Facility: CLINIC | Age: 54
End: 2019-05-17

## 2019-05-17 DIAGNOSIS — R91.8 PULMONARY NODULES: ICD-10-CM

## 2019-05-17 DIAGNOSIS — Z79.899 DRUG THERAPY: ICD-10-CM

## 2019-05-17 DIAGNOSIS — A41.52 SEPSIS DUE TO PSEUDOMONAS AERUGINOSA (H): ICD-10-CM

## 2019-05-17 DIAGNOSIS — Z94.1 HEART REPLACED BY TRANSPLANT (H): ICD-10-CM

## 2019-05-17 DIAGNOSIS — E03.9 HYPOTHYROIDISM, UNSPECIFIED TYPE: ICD-10-CM

## 2019-05-17 LAB
ANION GAP SERPL CALCULATED.3IONS-SCNC: 13 MMOL/L (ref 5–18)
BRONCHOSCOPY: NORMAL
BUN SERPL-MCNC: 52 MG/DL (ref 8–22)
CALCIUM SERPL-MCNC: 9.2 MG/DL (ref 8.5–10.5)
CHLORIDE BLD-SCNC: 106 MMOL/L (ref 98–107)
CMV DNA SPECIMEN TYPE: NORMAL
CMV QUANT IU/ML: NORMAL [IU]/ML
CO2 SERPL-SCNC: 16 MMOL/L (ref 22–31)
CREAT SERPL-MCNC: 2.33 MG/DL (ref 0.7–1.3)
ERYTHROCYTE [DISTWIDTH] IN BLOOD BY AUTOMATED COUNT: 17 % (ref 11–14.5)
GFR SERPL CREATININE-BSD FRML MDRD: 29 ML/MIN/1.73M2
GLUCOSE BLD-MCNC: 148 MG/DL (ref 70–125)
HCT VFR BLD AUTO: 29.2 % (ref 40–54)
HGB BLD-MCNC: 9 G/DL (ref 14–18)
LOG IU/ML OF CMVQNT: NORMAL {LOG_IU}/ML
MCH RBC QN AUTO: 29.8 PG (ref 27–34)
MCHC RBC AUTO-ENTMCNC: 30.8 G/DL (ref 32–36)
MCV RBC AUTO: 97 FL (ref 80–100)
PLATELET # BLD AUTO: 309 THOU/UL (ref 140–440)
PMV BLD AUTO: 9.5 FL (ref 8.5–12.5)
POTASSIUM BLD-SCNC: 4.4 MMOL/L (ref 3.5–5)
RBC # BLD AUTO: 3.02 MILL/UL (ref 4.4–6.2)
SODIUM SERPL-SCNC: 135 MMOL/L (ref 136–145)
TSH SERPL DL<=0.005 MIU/L-ACNC: 2.67 UIU/ML (ref 0.3–5)
WBC: 8.5 THOU/UL (ref 4–11)

## 2019-05-18 ENCOUNTER — INFUSION - HEALTHEAST (OUTPATIENT)
Dept: INFUSION THERAPY | Facility: CLINIC | Age: 54
End: 2019-05-18

## 2019-05-18 DIAGNOSIS — A41.52 SEPSIS DUE TO PSEUDOMONAS AERUGINOSA (H): ICD-10-CM

## 2019-05-18 DIAGNOSIS — R91.8 PULMONARY NODULES: ICD-10-CM

## 2019-05-19 ENCOUNTER — INFUSION - HEALTHEAST (OUTPATIENT)
Dept: INFUSION THERAPY | Facility: CLINIC | Age: 54
End: 2019-05-19

## 2019-05-19 DIAGNOSIS — R91.8 PULMONARY NODULES: ICD-10-CM

## 2019-05-19 DIAGNOSIS — A41.52 SEPSIS DUE TO PSEUDOMONAS AERUGINOSA (H): ICD-10-CM

## 2019-05-19 RX ORDER — ROSUVASTATIN CALCIUM 10 MG/1
10 TABLET, COATED ORAL DAILY
Qty: 90 TABLET | Refills: 1 | Status: SHIPPED | OUTPATIENT
Start: 2019-05-19 | End: 2019-05-19

## 2019-05-20 ENCOUNTER — PRE VISIT (OUTPATIENT)
Dept: TRANSPLANT | Facility: CLINIC | Age: 54
End: 2019-05-20

## 2019-05-20 ENCOUNTER — INFUSION - HEALTHEAST (OUTPATIENT)
Dept: INFUSION THERAPY | Facility: CLINIC | Age: 54
End: 2019-05-20

## 2019-05-20 ENCOUNTER — AMBULATORY - HEALTHEAST (OUTPATIENT)
Dept: CARDIAC REHAB | Facility: HOSPITAL | Age: 54
End: 2019-05-20

## 2019-05-20 DIAGNOSIS — Z13.220 LIPID SCREENING: ICD-10-CM

## 2019-05-20 DIAGNOSIS — Z94.1 STATUS POST HEART TRANSPLANTATION (H): ICD-10-CM

## 2019-05-20 DIAGNOSIS — Z13.29 THYROID DISORDER SCREENING: Primary | ICD-10-CM

## 2019-05-20 DIAGNOSIS — Z12.5 PROSTATE CANCER SCREENING ENCOUNTER, OPTIONS AND RISKS DISCUSSED: ICD-10-CM

## 2019-05-20 DIAGNOSIS — R91.8 PULMONARY NODULES: ICD-10-CM

## 2019-05-20 DIAGNOSIS — Z94.1 TRANSPLANTED HEART (H): ICD-10-CM

## 2019-05-20 DIAGNOSIS — A41.52 SEPSIS DUE TO PSEUDOMONAS AERUGINOSA (H): ICD-10-CM

## 2019-05-20 RX ORDER — SULFAMETHOXAZOLE/TRIMETHOPRIM 800-160 MG
TABLET ORAL
Qty: 10 TABLET | Refills: 0 | Status: ON HOLD | OUTPATIENT
Start: 2019-05-20 | End: 2019-05-26

## 2019-05-20 RX ORDER — ROSUVASTATIN CALCIUM 10 MG/1
10 TABLET, COATED ORAL DAILY
Qty: 90 TABLET | Refills: 1 | Status: SHIPPED | OUTPATIENT
Start: 2019-05-20 | End: 2019-10-20

## 2019-05-20 RX ORDER — BUMETANIDE 2 MG/1
2 TABLET ORAL
Qty: 60 TABLET | Refills: 0 | Status: SHIPPED | OUTPATIENT
Start: 2019-05-20 | End: 2019-05-24

## 2019-05-21 ENCOUNTER — COMMUNICATION - HEALTHEAST (OUTPATIENT)
Dept: INTERNAL MEDICINE | Facility: CLINIC | Age: 54
End: 2019-05-21

## 2019-05-21 ENCOUNTER — INFUSION - HEALTHEAST (OUTPATIENT)
Dept: INFUSION THERAPY | Facility: HOSPITAL | Age: 54
End: 2019-05-21

## 2019-05-21 DIAGNOSIS — Z94.1 TRANSPLANTED HEART (H): Primary | ICD-10-CM

## 2019-05-21 DIAGNOSIS — R91.8 PULMONARY NODULES: ICD-10-CM

## 2019-05-21 DIAGNOSIS — A41.52 SEPSIS DUE TO PSEUDOMONAS AERUGINOSA (H): ICD-10-CM

## 2019-05-21 RX ORDER — LIDOCAINE 40 MG/G
CREAM TOPICAL
Status: CANCELLED | OUTPATIENT
Start: 2019-05-21

## 2019-05-22 ENCOUNTER — HOSPITAL ENCOUNTER (OUTPATIENT)
Dept: CARDIOLOGY | Facility: CLINIC | Age: 54
DRG: 683 | End: 2019-05-22
Attending: INTERNAL MEDICINE | Admitting: INTERNAL MEDICINE
Payer: COMMERCIAL

## 2019-05-22 ENCOUNTER — APPOINTMENT (OUTPATIENT)
Dept: LAB | Facility: CLINIC | Age: 54
DRG: 683 | End: 2019-05-22
Attending: INTERNAL MEDICINE
Payer: COMMERCIAL

## 2019-05-22 ENCOUNTER — APPOINTMENT (OUTPATIENT)
Dept: MEDSURG UNIT | Facility: CLINIC | Age: 54
DRG: 683 | End: 2019-05-22
Attending: INTERNAL MEDICINE
Payer: COMMERCIAL

## 2019-05-22 ENCOUNTER — TELEPHONE (OUTPATIENT)
Dept: TRANSPLANT | Facility: CLINIC | Age: 54
End: 2019-05-22

## 2019-05-22 ENCOUNTER — INFUSION THERAPY VISIT (OUTPATIENT)
Dept: INFUSION THERAPY | Facility: CLINIC | Age: 54
DRG: 683 | End: 2019-05-22
Attending: INTERNAL MEDICINE
Payer: COMMERCIAL

## 2019-05-22 ENCOUNTER — HOSPITAL ENCOUNTER (OUTPATIENT)
Dept: CT IMAGING | Facility: CLINIC | Age: 54
DRG: 683 | End: 2019-05-22
Attending: INTERNAL MEDICINE | Admitting: INTERNAL MEDICINE
Payer: COMMERCIAL

## 2019-05-22 ENCOUNTER — RESULTS ONLY (OUTPATIENT)
Dept: OTHER | Facility: CLINIC | Age: 54
End: 2019-05-22

## 2019-05-22 ENCOUNTER — HOSPITAL ENCOUNTER (OUTPATIENT)
Facility: CLINIC | Age: 54
Discharge: HOME | DRG: 683 | End: 2019-05-22
Attending: INTERNAL MEDICINE | Admitting: INTERNAL MEDICINE
Payer: COMMERCIAL

## 2019-05-22 ENCOUNTER — SURGERY (OUTPATIENT)
Age: 54
End: 2019-05-22
Payer: COMMERCIAL

## 2019-05-22 VITALS
TEMPERATURE: 98.2 F | DIASTOLIC BLOOD PRESSURE: 90 MMHG | OXYGEN SATURATION: 98 % | RESPIRATION RATE: 18 BRPM | SYSTOLIC BLOOD PRESSURE: 125 MMHG | HEART RATE: 115 BPM

## 2019-05-22 VITALS
RESPIRATION RATE: 18 BRPM | DIASTOLIC BLOOD PRESSURE: 99 MMHG | OXYGEN SATURATION: 98 % | SYSTOLIC BLOOD PRESSURE: 139 MMHG | TEMPERATURE: 98.4 F

## 2019-05-22 DIAGNOSIS — J15.1 PNEUMONIA DUE TO PSEUDOMONAS SPECIES, UNSPECIFIED LATERALITY, UNSPECIFIED PART OF LUNG (H): ICD-10-CM

## 2019-05-22 DIAGNOSIS — Z94.1 TRANSPLANTED HEART (H): ICD-10-CM

## 2019-05-22 DIAGNOSIS — Z94.1 TRANSPLANTED HEART (H): Primary | ICD-10-CM

## 2019-05-22 DIAGNOSIS — Z94.1 HEART REPLACED BY TRANSPLANT (H): ICD-10-CM

## 2019-05-22 DIAGNOSIS — Z13.29 THYROID DISORDER SCREENING: ICD-10-CM

## 2019-05-22 DIAGNOSIS — Z12.5 PROSTATE CANCER SCREENING ENCOUNTER, OPTIONS AND RISKS DISCUSSED: ICD-10-CM

## 2019-05-22 DIAGNOSIS — Z94.1 HEART REPLACED BY TRANSPLANT (H): Primary | ICD-10-CM

## 2019-05-22 DIAGNOSIS — Z13.220 LIPID SCREENING: ICD-10-CM

## 2019-05-22 DIAGNOSIS — A41.52 SEPSIS DUE TO PSEUDOMONAS AERUGINOSA (H): ICD-10-CM

## 2019-05-22 LAB
ALBUMIN SERPL-MCNC: 3.5 G/DL (ref 3.4–5)
ALP SERPL-CCNC: 109 U/L (ref 40–150)
ALT SERPL W P-5'-P-CCNC: 20 U/L (ref 0–70)
ANION GAP SERPL CALCULATED.3IONS-SCNC: 10 MMOL/L (ref 3–14)
AST SERPL W P-5'-P-CCNC: 15 U/L (ref 0–45)
BASOPHILS # BLD AUTO: 0.2 10E9/L (ref 0–0.2)
BASOPHILS NFR BLD AUTO: 2.1 %
BILIRUB SERPL-MCNC: 0.4 MG/DL (ref 0.2–1.3)
BUN SERPL-MCNC: 53 MG/DL (ref 7–30)
CALCIUM SERPL-MCNC: 9.3 MG/DL (ref 8.5–10.1)
CHLORIDE SERPL-SCNC: 102 MMOL/L (ref 94–109)
CHOLEST SERPL-MCNC: 204 MG/DL
CK SERPL-CCNC: 26 U/L (ref 30–300)
CO2 SERPL-SCNC: 25 MMOL/L (ref 20–32)
CREAT SERPL-MCNC: 2.43 MG/DL (ref 0.66–1.25)
DIFFERENTIAL METHOD BLD: ABNORMAL
EOSINOPHIL # BLD AUTO: 0.2 10E9/L (ref 0–0.7)
EOSINOPHIL NFR BLD AUTO: 2.3 %
ERYTHROCYTE [DISTWIDTH] IN BLOOD BY AUTOMATED COUNT: 17.4 % (ref 10–15)
GFR SERPL CREATININE-BSD FRML MDRD: 29 ML/MIN/{1.73_M2}
GLUCOSE SERPL-MCNC: 82 MG/DL (ref 70–99)
HCT VFR BLD AUTO: 32 % (ref 40–53)
HDLC SERPL-MCNC: 85 MG/DL
HGB BLD-MCNC: 9.7 G/DL (ref 13.3–17.7)
IMM GRANULOCYTES # BLD: 0.2 10E9/L (ref 0–0.4)
IMM GRANULOCYTES NFR BLD: 2 %
LDLC SERPL CALC-MCNC: 84 MG/DL
LYMPHOCYTES # BLD AUTO: 1.3 10E9/L (ref 0.8–5.3)
LYMPHOCYTES NFR BLD AUTO: 14.8 %
MAGNESIUM SERPL-MCNC: 1.9 MG/DL (ref 1.6–2.3)
MCH RBC QN AUTO: 29.1 PG (ref 26.5–33)
MCHC RBC AUTO-ENTMCNC: 30.3 G/DL (ref 31.5–36.5)
MCV RBC AUTO: 96 FL (ref 78–100)
MONOCYTES # BLD AUTO: 0.3 10E9/L (ref 0–1.3)
MONOCYTES NFR BLD AUTO: 3.7 %
NEUTROPHILS # BLD AUTO: 6.7 10E9/L (ref 1.6–8.3)
NEUTROPHILS NFR BLD AUTO: 75.1 %
NONHDLC SERPL-MCNC: 119 MG/DL
NRBC # BLD AUTO: 0 10*3/UL
NRBC BLD AUTO-RTO: 0 /100
PHOSPHATE SERPL-MCNC: 4.2 MG/DL (ref 2.5–4.5)
PLATELET # BLD AUTO: 269 10E9/L (ref 150–450)
POTASSIUM SERPL-SCNC: 3.7 MMOL/L (ref 3.4–5.3)
PROT SERPL-MCNC: 7.3 G/DL (ref 6.8–8.8)
PSA SERPL-ACNC: 0.29 UG/L (ref 0–4)
RBC # BLD AUTO: 3.33 10E12/L (ref 4.4–5.9)
SODIUM SERPL-SCNC: 138 MMOL/L (ref 133–144)
TACROLIMUS BLD-MCNC: 7.9 UG/L (ref 5–15)
TME LAST DOSE: NORMAL H
TRIGL SERPL-MCNC: 175 MG/DL
TSH SERPL DL<=0.005 MIU/L-ACNC: 3.56 MU/L (ref 0.4–4)
WBC # BLD AUTO: 9 10E9/L (ref 4–11)

## 2019-05-22 PROCEDURE — 40000166 ZZH STATISTIC PP CARE STAGE 1

## 2019-05-22 PROCEDURE — 86352 CELL FUNCTION ASSAY W/STIM: CPT | Performed by: INTERNAL MEDICINE

## 2019-05-22 PROCEDURE — 93505 ENDOMYOCARDIAL BIOPSY: CPT | Mod: 26 | Performed by: INTERNAL MEDICINE

## 2019-05-22 PROCEDURE — 86833 HLA CLASS II HIGH DEFIN QUAL: CPT | Performed by: INTERNAL MEDICINE

## 2019-05-22 PROCEDURE — 80053 COMPREHEN METABOLIC PANEL: CPT | Performed by: INTERNAL MEDICINE

## 2019-05-22 PROCEDURE — 93306 TTE W/DOPPLER COMPLETE: CPT | Mod: 26 | Performed by: INTERNAL MEDICINE

## 2019-05-22 PROCEDURE — 88350 IMFLUOR EA ADDL 1ANTB STN PX: CPT | Performed by: INTERNAL MEDICINE

## 2019-05-22 PROCEDURE — 83735 ASSAY OF MAGNESIUM: CPT | Performed by: INTERNAL MEDICINE

## 2019-05-22 PROCEDURE — 93505 ENDOMYOCARDIAL BIOPSY: CPT | Performed by: INTERNAL MEDICINE

## 2019-05-22 PROCEDURE — 84443 ASSAY THYROID STIM HORMONE: CPT | Performed by: INTERNAL MEDICINE

## 2019-05-22 PROCEDURE — 25000125 ZZHC RX 250: Performed by: INTERNAL MEDICINE

## 2019-05-22 PROCEDURE — 80197 ASSAY OF TACROLIMUS: CPT | Performed by: INTERNAL MEDICINE

## 2019-05-22 PROCEDURE — 93306 TTE W/DOPPLER COMPLETE: CPT

## 2019-05-22 PROCEDURE — 88346 IMFLUOR 1ST 1ANTB STAIN PX: CPT | Performed by: INTERNAL MEDICINE

## 2019-05-22 PROCEDURE — 88307 TISSUE EXAM BY PATHOLOGIST: CPT | Performed by: INTERNAL MEDICINE

## 2019-05-22 PROCEDURE — 36415 COLL VENOUS BLD VENIPUNCTURE: CPT | Performed by: INTERNAL MEDICINE

## 2019-05-22 PROCEDURE — C1894 INTRO/SHEATH, NON-LASER: HCPCS | Performed by: INTERNAL MEDICINE

## 2019-05-22 PROCEDURE — 93451 RIGHT HEART CATH: CPT | Performed by: INTERNAL MEDICINE

## 2019-05-22 PROCEDURE — 87799 DETECT AGENT NOS DNA QUANT: CPT | Performed by: INTERNAL MEDICINE

## 2019-05-22 PROCEDURE — 27210794 ZZH OR GENERAL SUPPLY STERILE: Performed by: INTERNAL MEDICINE

## 2019-05-22 PROCEDURE — 25000128 H RX IP 250 OP 636: Mod: ZF | Performed by: INTERNAL MEDICINE

## 2019-05-22 PROCEDURE — 85025 COMPLETE CBC W/AUTO DIFF WBC: CPT | Performed by: INTERNAL MEDICINE

## 2019-05-22 PROCEDURE — 71250 CT THORAX DX C-: CPT

## 2019-05-22 PROCEDURE — 80061 LIPID PANEL: CPT | Performed by: INTERNAL MEDICINE

## 2019-05-22 PROCEDURE — 86832 HLA CLASS I HIGH DEFIN QUAL: CPT | Performed by: INTERNAL MEDICINE

## 2019-05-22 PROCEDURE — 82550 ASSAY OF CK (CPK): CPT | Performed by: INTERNAL MEDICINE

## 2019-05-22 PROCEDURE — G0103 PSA SCREENING: HCPCS | Performed by: INTERNAL MEDICINE

## 2019-05-22 PROCEDURE — 25800030 ZZH RX IP 258 OP 636: Mod: ZF | Performed by: INTERNAL MEDICINE

## 2019-05-22 PROCEDURE — 84100 ASSAY OF PHOSPHORUS: CPT | Performed by: INTERNAL MEDICINE

## 2019-05-22 RX ORDER — HEPARIN SODIUM (PORCINE) LOCK FLUSH IV SOLN 100 UNIT/ML 100 UNIT/ML
5 SOLUTION INTRAVENOUS
Status: CANCELLED | OUTPATIENT
Start: 2019-05-22

## 2019-05-22 RX ORDER — HEPARIN SODIUM,PORCINE 10 UNIT/ML
5 VIAL (ML) INTRAVENOUS
Status: CANCELLED | OUTPATIENT
Start: 2019-05-22

## 2019-05-22 RX ORDER — LIDOCAINE 40 MG/G
CREAM TOPICAL
Status: COMPLETED | OUTPATIENT
Start: 2019-05-22 | End: 2019-05-22

## 2019-05-22 RX ADMIN — LIDOCAINE: 40 CREAM TOPICAL at 11:52

## 2019-05-22 RX ADMIN — LIDOCAINE HYDROCHLORIDE 10 ML: 10 INJECTION, SOLUTION EPIDURAL; INFILTRATION; INTRACAUDAL; PERINEURAL at 13:50

## 2019-05-22 RX ADMIN — MICAFUNGIN SODIUM 150 MG: 10 INJECTION, POWDER, LYOPHILIZED, FOR SOLUTION INTRAVENOUS at 15:57

## 2019-05-22 NOTE — PROGRESS NOTES
Pt back from CCL s/p RHC and heart biopsy.  VSS.  Pt alert and oriented x4.  Rt neck site drsg F/D/I.  Pt denies any pain. Pt to remain NPO for a CT scan scheduled this afternoon.  1440-Discharge instructions went over with and given to pt, all questions answered.  Pt discharged to gold waiting room to check in for a scheduled CT scan.

## 2019-05-22 NOTE — PROGRESS NOTES
Nursing Note  Everton Larios presents today to [facility] for:   Chief Complaint   Patient presents with     Infusion     micafungin     During today's [facility] appointment, orders from Dr. Shukla were completed.  Frequency: once    Progress note:  Patient identification verified by name and date of birth.  Assessment completed.  Vitals recorded in Doc Flowsheets.  Patient was provided with education regarding infusion and possible side effects.  Patient verbalized understanding.    Patient came in with continouis Cefepime running in PICC line. Stopped medication, flushed line, + for blood return. Mycamine infused over 1 hour, flushed line again, replaced with Cefepime.    Dressing changes today using sterile technique.    Infusion length and rate:  infusion given over approximately 60 minutes.    Post Infusion Assessment:  Patient tolerated infusion without incident.     Discharge Plan:   Follow up plan of care with: Primary MD  Discharge instructions were reviewed with patient.  Patient/representative verbalized understanding of discharge instructions and all questions answered.  Patient discharged from [facility] in stable condition.    Agata Nunez RN    Administrations This Visit     micafungin (MYCAMINE) 150 mg in sodium chloride 0.9 % 100 mL intermittent infusion     Admin Date  05/22/2019 Action  New Bag Dose  150 mg Rate  110 mL/hr Route  Intravenous Administered By  Agata Nunez RN                /90   Pulse 115   Temp 98.2  F (36.8  C) (Oral)   Resp 18   SpO2 98%

## 2019-05-22 NOTE — PATIENT INSTRUCTIONS
Patient Education     Micafungin Sodium Solution for injection  What is this medicine?  MICAFUNGIN (LISA camejo) is an antifungal medicine. It is used to treat or prevent certain kinds of fungal or yeast infections.  This medicine may be used for other purposes; ask your health care provider or pharmacist if you have questions.  What should I tell my health care provider before I take this medicine?  They need to know if you have any of these conditions:    cancer    HIV infection or AIDS    liver disease    kidney disease    an unusual or allergic reaction to micafungin, other medicines, foods, dyes, or preservatives    pregnant or trying to get pregnant    breast-feeding  How should I use this medicine?  This medicine is for infusion into a vein. It is usually given by a health care professional in a hospital or clinic setting.  If you get this medicine at home, you will be taught how to prepare and give this medicine. Use exactly as directed. Take your medicine at regular intervals. Do not take it more often than directed.  It is important that you put your used needles and syringes in a special sharps container. Do not put them in a trash can. If you do not have a sharps container, call your pharmacist or healthcare provider to get one.  Talk to your pediatrician regarding the use of this medicine in children. While this drug may be prescribed for children as young as 4 months for selected conditions, precautions do apply.  Overdosage: If you think you have taken too much of this medicine contact a poison control center or emergency room at once.  NOTE: This medicine is only for you. Do not share this medicine with others.  What if I miss a dose?  This does not apply.  What may interact with this medicine?    nifedipine    sirolimus  This list may not describe all possible interactions. Give your health care provider a list of all the medicines, herbs, non-prescription drugs, or dietary supplements you use.  Also tell them if you smoke, drink alcohol, or use illegal drugs. Some items may interact with your medicine.  What should I watch for while using this medicine?  If you are taking this medicine to treat an infection, tell your doctor or healthcare professional if your symptoms do not start to get better or if they get worse. During your treatment, your progress will be monitored.  What side effects may I notice from receiving this medicine?  Side effects that you should report to your doctor or health care professional as soon as possible:    abdominal pain    agitation    allergic reactions like skin rash or itching, hives, swelling of the lips, mouth, tongue, or throat    breathing problems    confusion    fever or infection    redness, pain at the site of injection    trouble passing urine or change in the amount of urine    unusual bleeding or bruising    unusually weak or tired    yellowing of the eyes or skin  Side effects that usually do not require medical attention (report to your doctor or health care professional if they continue or are bothersome):    diarrhea    dizziness    headache    nausea, vomiting    stomach upset    tiredness  This list may not describe all possible side effects. Call your doctor for medical advice about side effects. You may report side effects to FDA at 6-685-FDA-8444.  Where should I keep my medicine?  Keep out of the reach of children.  If you are using this medicine at home, you will be instructed on how to store this medicine. Throw away any unused medicine after the expiration date on the label.  NOTE:This sheet is a summary. It may not cover all possible information. If you have questions about this medicine, talk to your doctor, pharmacist, or health care provider. Copyright  2016 [employer]           Patient Education     Micafungin Sodium Solution for injection  What is this medicine?  MICAFUNGIN (MYE ka FUN jin) is an antifungal medicine. It is used to treat or  prevent certain kinds of fungal or yeast infections.  This medicine may be used for other purposes; ask your health care provider or pharmacist if you have questions.  What should I tell my health care provider before I take this medicine?  They need to know if you have any of these conditions:    cancer    HIV infection or AIDS    liver disease    kidney disease    an unusual or allergic reaction to micafungin, other medicines, foods, dyes, or preservatives    pregnant or trying to get pregnant    breast-feeding  How should I use this medicine?  This medicine is for infusion into a vein. It is usually given by a health care professional in a hospital or clinic setting.  If you get this medicine at home, you will be taught how to prepare and give this medicine. Use exactly as directed. Take your medicine at regular intervals. Do not take it more often than directed.  It is important that you put your used needles and syringes in a special sharps container. Do not put them in a trash can. If you do not have a sharps container, call your pharmacist or healthcare provider to get one.  Talk to your pediatrician regarding the use of this medicine in children. While this drug may be prescribed for children as young as 4 months for selected conditions, precautions do apply.  Overdosage: If you think you have taken too much of this medicine contact a poison control center or emergency room at once.  NOTE: This medicine is only for you. Do not share this medicine with others.  What if I miss a dose?  This does not apply.  What may interact with this medicine?    nifedipine    sirolimus  This list may not describe all possible interactions. Give your health care provider a list of all the medicines, herbs, non-prescription drugs, or dietary supplements you use. Also tell them if you smoke, drink alcohol, or use illegal drugs. Some items may interact with your medicine.  What should I watch for while using this medicine?  If  you are taking this medicine to treat an infection, tell your doctor or healthcare professional if your symptoms do not start to get better or if they get worse. During your treatment, your progress will be monitored.  What side effects may I notice from receiving this medicine?  Side effects that you should report to your doctor or health care professional as soon as possible:    abdominal pain    agitation    allergic reactions like skin rash or itching, hives, swelling of the lips, mouth, tongue, or throat    breathing problems    confusion    fever or infection    redness, pain at the site of injection    trouble passing urine or change in the amount of urine    unusual bleeding or bruising    unusually weak or tired    yellowing of the eyes or skin  Side effects that usually do not require medical attention (report to your doctor or health care professional if they continue or are bothersome):    diarrhea    dizziness    headache    nausea, vomiting    stomach upset    tiredness  This list may not describe all possible side effects. Call your doctor for medical advice about side effects. You may report side effects to FDA at 6-416-FDA-4407.  Where should I keep my medicine?  Keep out of the reach of children.  If you are using this medicine at home, you will be instructed on how to store this medicine. Throw away any unused medicine after the expiration date on the label.  NOTE:This sheet is a summary. It may not cover all possible information. If you have questions about this medicine, talk to your doctor, pharmacist, or health care provider. Copyright  2016 [employer] Standard

## 2019-05-22 NOTE — PROGRESS NOTES
Pt ready for consent.  Pt up to the bathroom.  Labs pending.  Pt's parents are in the [facility] waiting room.

## 2019-05-22 NOTE — TELEPHONE ENCOUNTER
Echo results discussed with Dr. Donis. Pathology called to have hbx from today changed to stat. Pathology technician said it was too late, but they would run right away in the morning. Repeat echo scheduled for 5/28 until we have a further information.

## 2019-05-22 NOTE — PROGRESS NOTES
[facility]   Interventional Cardiology        Consenting/Education for Right Heart Catheterization/Endomyocardial Biopsy    Patient understands during the portion for right heart catheterization a fine tube (catheter) is put into the vein of the groin/neck.  It is carefully passed along until it reaches the heart and then goes up into the blood vessels of the lungs. This is done to measure a variety of pressures in your heart and can tell us how well the heart is filling and emptying, as well as monitor fluid status. While the catheter is in your neck/groin vein, a  Biopsy forceps  or pincers at the end of the catheter are used to take the tissue samples. This is may be repeated to get enough samples. The biopsy pieces are very small (one to two millimeters).     Patient also understands risks and complications of the procedure which include, but are not limited to bruising/swelling around the incision site, infection, bleeding, allergic reaction to local anesthetic, air embolism, arterial puncture, stroke, heart attack.       Patient verbalized understanding of risks and benefits of the right heart catheterization, and endomyocardial biopsy and has elected to proceed with the procedure.     Stevie Alejo PA-C  [facility] Cardiology Team

## 2019-05-22 NOTE — IP AVS SNAPSHOT
MRN:[id number]                      After Visit Summary   5/22/2019    Everton Larios    MRN: [id number]           Visit Information        Department      5/22/2019  8:56 AM [address] [facility]          Review of your medicines      UNREVIEWED medicines. Ask your doctor about these medicines       Dose / Directions   acetaminophen 325 MG tablet  Commonly known as:  TYLENOL  Used for:  Heart replaced by transplant (H)      Dose:  650 mg  Take 2 tablets (650 mg) by mouth every 4 hours as needed for mild pain  Refills:  0     albuterol 108 (90 Base) MCG/ACT inhaler  Commonly known as:  PROAIR HFA/PROVENTIL HFA/VENTOLIN HFA  Used for:  Intermittent asthma without complication, unspecified asthma severity      Dose:  2 puff  Inhale 2 puffs into the lungs every 6 hours as needed for shortness of breath / dyspnea or wheezing  Refills:  0     aspirin 81 MG chewable tablet  Commonly known as:  ASA  Used for:  S/P orthotopic heart transplant (H)      Dose:  81 mg  Take 1 tablet (81 mg) by mouth daily  Refills:  0     balsalazide 750 MG capsule  Commonly known as:  COLAZAL      TAKE 2 CAPSULES BY MOUTH TWICE DAILY  Refills:  0     bumetanide 2 MG tablet  Commonly known as:  BUMEX  Used for:  Heart replaced by transplant (H)      Dose:  2 mg  Take 1 tablet (2 mg) by mouth 2 times daily  Quantity:  60 tablet  Refills:  0     calcium carbonate 500 MG chewable tablet  Commonly known as:  TUMS  Used for:  Heart replaced by transplant (H)      Dose:  1 chew tab  Take 1 tablet (500 mg) by mouth daily as needed for heartburn  Refills:  0     calcium carbonate 600 mg-vitamin D 400 units 600-400 MG-UNIT per tablet  Commonly known as:  CALTRATE  Used for:  Heart replaced by transplant (H)      Dose:  1 tablet  Take 1 tablet by mouth 2 times daily (with meals)  Refills:  0     ceFEPIme 2 G vial  Commonly known as:  MAXIPIME  Indication:  Bacteria in the Blood  Used for:  Sepsis due to Pseudomonas aeruginosa  (H)      Dose:  2 g  Inject 2 g into the vein every 12 hours for 28 days  Quantity:  1120 mL  Refills:  0     DULoxetine 20 MG capsule  Commonly known as:  CYMBALTA      Dose:  20 mg  Take 20 mg by mouth daily  Refills:  0     fluticasone 220 MCG/ACT inhaler  Commonly known as:  FLOVENT HFA      Dose:  2 puff  Inhale 2 puffs into the lungs 2 times daily  Refills:  0     fluticasone 50 MCG/ACT nasal spray  Commonly known as:  FLONASE      Dose:  1 spray  Spray 1 spray into both nostrils 2 times daily  Refills:  0     levothyroxine 100 MCG tablet  Commonly known as:  SYNTHROID/LEVOTHROID      Dose:  100 mcg  Take 100 mcg by mouth daily  Refills:  0     magnesium oxide 400 MG tablet  Commonly known as:  MAG-OX  Used for:  Heart replaced by transplant (H)      Dose:  400 mg  Take 1 tablet (400 mg) by mouth 2 times daily  Quantity:  180 tablet  Refills:  3     melatonin 3 MG tablet  Used for:  Heart replaced by transplant (H)      Dose:  6 mg  Take 2 tablets (6 mg) by mouth At Bedtime  Refills:  0     micafungin 150 mg  Indication:  fungal infection  Used for:  Pulmonary nodules      Dose:  150 mg  Inject 150 mg into the vein every 24 hours for 28 days  Quantity:  1 Bag  Refills:  3     multivitamin w/minerals tablet  Used for:  Heart replaced by transplant (H)      Dose:  1 tablet  Take 1 tablet by mouth daily  Refills:  0     mycophenolate 250 MG capsule  Commonly known as:  GENERIC EQUIVALENT  Used for:  Heart replaced by transplant (H)      Dose:  1000 mg  Take 4 capsules (1,000 mg) by mouth 2 times daily  Quantity:  180 capsule  Refills:  11     nystatin 574480 UNIT/ML suspension  Commonly known as:  MYCOSTATIN  Indication:  Candidiasis Fungal Infection of the Oropharynx  Used for:  Heart replaced by transplant (H)      Dose:  723770 Units  Take 5 mLs (500,000 Units) by mouth 4 times daily  Quantity:  600 mL  Refills:  0     omeprazole 40 MG DR capsule  Commonly known as:  priLOSEC  Used for:  Nausea      Dose:  40  mg  Take 1 capsule (40 mg) by mouth daily  Refills:  0     potassium chloride ER 20 MEQ CR tablet  Commonly known as:  K-DUR/KLOR-CON M  Used for:  Heart replaced by transplant (H)      Dose:  20 mEq  Take 1 tablet (20 mEq) by mouth 2 times daily  Quantity:  120 tablet  Refills:  0     predniSONE 5 MG tablet  Commonly known as:  DELTASONE  Used for:  S/P orthotopic heart transplant (H)      Dose:  5 mg  Take 5 mg by mouth 2 times daily.  Quantity:  90 tablet  Refills:  1     rosuvastatin 10 MG tablet  Commonly known as:  CRESTOR  Used for:  Heart replaced by transplant (H)      Dose:  10 mg  Take 1 tablet (10 mg) by mouth daily  Quantity:  90 tablet  Refills:  1     senna 8.6 MG tablet  Commonly known as:  SENOKOT      Dose:  2 tablet  Take 2 tablets by mouth 2 times daily as needed for constipation  Refills:  0     SINGULAIR 10 MG tablet  Used for:  Intermittent asthma without complication, unspecified asthma severity  Generic drug:  montelukast      Dose:  10 mg  Take 1 tablet (10 mg) by mouth At Bedtime  Refills:  0     sulfamethoxazole-trimethoprim 800-160 MG tablet  Commonly known as:  BACTRIM DS/SEPTRA DS  Indication:  Preventative Medication Therapy Used Around Surgery  Used for:  Heart replaced by transplant (H)      Three times weekly: Monday, Wednesday, Friday  Quantity:  10 tablet  Refills:  0     * tacrolimus 0.5 MG capsule  Commonly known as:  GENERIC EQUIVALENT      Take one 0.5 mg capsule by mouth twice daily (will take total of 2.5mg in the morning and total of 2.5mg in the evening)  Refills:  0     * tacrolimus 1 MG capsule  Commonly known as:  GENERIC EQUIVALENT      Take 2.5 mg by mouth  (2 x 1 mg + 1 x 0.5 mg) in the morning and  in the evening  Refills:  0     valGANciclovir 450 MG tablet  Commonly known as:  VALCYTE  Indication:  Medication Treatment to Prevent Cytomegalovirus Disease  Used for:  Heart replaced by transplant (H)      Dose:  450 mg  Take 1 tablet (450 mg) by mouth  daily  Quantity:  30 tablet  Refills:  3         * This list has 2 medication(s) that are the same as other medications prescribed for you. Read the directions carefully, and ask your doctor or other care provider to review them with you.                  Protect others around you: Learn how to safely use, store and throw away your medicines at www.disposemymeds.org.       Follow-ups after your visit       Your next 10 appointments already scheduled    May 22, 2019  Procedure with Yves Rodrigues MD  [facility], [place],  Heart Cath Lab ([facility]) [address] [place] [id number]  [phone]   The [facility] is located on the corner of [facility] and [facility] on the [facility]. It is easily accessible from virtually any point in the [place] area, via [address] and [address].   May 22, 2019  1:40 PM CDT  CT CHEST W/O & W CONTRAST with UUCT1  [facility], [place], [place] ([facility]) [address] [address]  [phone]   How do I prepare for my exam? (Food and drink instructions)  **You will have contrast for this exam.**  Do not eat or drink for 2 hours before your exam. If you need to take medicine, you may take it with small sips of water. (We may ask you to take liquid medicine as well.)    The day before your exam, drink extra fluids at least six 8-ounce glasses (unless your doctor tells you to restrict your fluids).    How do I prepare for my exam? (Other instructions)  Patients over 70 or patients with diabetes or kidney problems: If you haven t had a blood test (creatinine test) within the last 30 days, the Cardiologist/Radiologist may require you to get this test prior to your exam.    What should I wear: Please wear loose clothing, such as a sweat suit or jogging clothes. Avoid snaps, zippers and other metal. We may ask you to undress and put on a hospital  gown.    How long does the exam take: Most scans take less than 20 minutes.    What should I bring: Please bring any scans or X-rays taken at other hospitals, if similar tests were done. Also bring a list of your medicines, including vitamins, minerals and over-the-counter drugs. It is safest to leave personal items at home.    Do I need a : No  is needed.    What do I need to tell my doctor?  Be sure to tell your doctor:  * If you have any allergies.  * If there s any chance you are pregnant.  * If you are breastfeeding.  * If you have diabetes as your medication may need to be adjusted for this exam.    What should I do after the exam: No restrictions, You may resume normal activities.    What is this test: A CT (computed tomography) scan is a series of pictures that allows us to look inside your body. The scanner creates images of the body in cross sections, much like slices of bread. This helps us see any problems more clearly. You may receive contrast (X-ray dye) before or during your scan. Contrast is given through an IV (small needle in your arm).    Who should I call with questions: If you have any questions, please call the Imaging Department where you will have your exam. Directions, parking instructions, and other information is available on our website, [email or web address].     May 22, 2019  3:30 PM CDT  Infusion 120 with UC SPEC INFUSION, UC 48 ATC  [facility] ([facility]) [address] [place] [address] [place]  [address]  [facility] [place] [id number] [phone]      May 24, 2019  3:00 PM CDT  (Arrive by 2:45 PM)  RETURN HEART TRANSPLANT with Corinna Donis MD  [facility] Heart [facility] ([facility]) [address] [place] [address] [place]  [address]  [facility] [place] [address] [phone]      May 28, 2019  2:30 PM CDT  (Arrive by 2:15 PM)  Return Visit with Ric Shukla MD  [facility] and Infectious Diseases (  [facility]) [address]  [address]  [facility] [address]  [phone]      Jun 18, 2019 10:00 AM CDT  LAB with UU LAB GOLD WAITING  [facility]) [address] [facility] [address]   Please do not eat 10-12 hours before your appointment if you are coming in fasting for labs on lipids, cholesterol, or glucose (sugar). Does not apply to pregnant women. Water, tea and black coffee (with nothing added) is okay. Do not drink other fluids, diet soda or gum. If you have concerns about taking your medications, please send a message by clicking on Secure Messaging, Message Your Care Team.     Jun 18, 2019 10:30 AM CDT  Procedure - 2.5 hour with U2A ROOM 3  [address]A [facility] ([facility]) [address] [facility] [address]      Jun 18, 2019  Procedure with Santino Mckeon MD  [facility]  Heart [facility] Lab ([facility]) [address] [facility] [address]  [phone]   The [facility] is located on the corner of [facility] [facility] and [facility] on the [facility]. It is easily accessible from virtually any point in the metro area, via [address] and [address].   Jun 18, 2019  2:30 PM CDT  (Arrive by 2:15 PM)  RETURN HEART TRANSPLANT with Elin Jensen NP  [facility] Heart Care ([facility]) [address] [place] [address]  [address]  [facility] [address]  [phone]         Care Instructions       Further instructions from your care team       [facility]                        Interventional Cardiology  Discharge Instructions   Post Right Heart Cath and Biopsy      AFTER YOU GO HOME:    DO drink plenty of fluids    DO resume your regular diet and medications unless otherwise instructed by your Primary Physician    Do Not scrub the procedure site  vigorously    No lotion or powder to the puncture site for 3 days    CALL YOUR PRIMARY PHYSICIAN IF: You may resume all normal activity.  Monitor neck site for bleeding, swelling, or voice changes. If you notice bleeding or swelling immediately apply pressure to the site and call number below to speak with Cardiology Fellow.  If you experience any changes in your breathing you should call your doctor immediately or come to the closest Emergency Department.  Do not drive yourself.    ADDITIONAL INSTRUCTIONS: Medications: You are to resume all home medications including anticoagulation therapy unless otherwise advised by your primary cardiologist or nurse coordinator.    Follow Up: Per your primary cardiology team    If you have any questions or concerns regarding your procedure site please call [phone] at anytime and ask for Cardiology Fellow on call.  They are available 24 hours a day.  You may also contact the Cardiology Clinic after hours number at [phone].                                                       Telephone Numbers [phone] Monday-Friday 8:00 am to 4:30 pm    [phone] After 4:30 pm Monday-Friday, Weekends & Holidays  Ask for Interventional Cardiologist on call. Someone is on call 24 hours/day   [facility] toll free number [phone] Monday-Friday 8:00 am to 4:30 pm   [facility] Emergency Dept [phone]                   Additional Information About Your Visit       [employer] Information    [employer] gives you secure access to your electronic health record. If you see a primary care provider, you can also send messages to your care team and make appointments. If you have questions, please call your primary care clinic.  If you do not have a primary care provider, please call [phone] and they will assist you.       Care EveryWhere ID    This is your Care EveryWhere ID. This could be used by other organizations to access your [place] medical records  [id number]       Your  Vitals Were     Blood Pressure   145/100   (BP Location: Left arm, Cuff Size: Adult Small)    Temperature   98.4  F (36.9  C) (Oral)    Respirations   20            Primary Care Provider Office Phone # Fax #    Fredrick J New [phone]      Equal Access to Services    SUSHILSHELLI YUSUF : Hadii aaron ku hadasho Soomaali, waaxda luqadaha, qaybta kaalmada adeegyada, waxjulia sabiha eddairam shelton everardo perri . [facility] [phone].    ATENCIÓN: Si habla español, tiene a anaya disposición servicios gratuitos de asistencia lingüística. [facility] [phone].    We comply with applicable federal civil rights laws and [place] laws. We do not discriminate on the basis of race, color, national origin, age, disability, sex, sexual orientation, or gender identity.           Thank you!    Thank you for choosing [facility] for your care. Our goal is always to provide you with excellent care. Hearing back from our patients is one way we can continue to improve our services. Please take a few minutes to complete the written survey that you may receive in the mail after you visit with us. Thank you!            Medication List      ASK your doctor about these medications          Morning Afternoon Evening Bedtime As Needed    acetaminophen 325 MG tablet  Also known as:  TYLENOL  INSTRUCTIONS:  Take 2 tablets (650 mg) by mouth every 4 hours as needed for mild pain                     albuterol 108 (90 Base) MCG/ACT inhaler  Also known as:  PROAIR HFA/PROVENTIL HFA/VENTOLIN HFA  INSTRUCTIONS:  Inhale 2 puffs into the lungs every 6 hours as needed for shortness of breath / dyspnea or wheezing                     aspirin 81 MG chewable tablet  Also known as:  ASA  INSTRUCTIONS:  Take 1 tablet (81 mg) by mouth daily                     balsalazide 750 MG capsule  Also known as:  COLAZAL  INSTRUCTIONS:  TAKE 2 CAPSULES BY MOUTH TWICE DAILY                     bumetanide 2 MG tablet  Also known as:  BUMEX  INSTRUCTIONS:  Take 1 tablet  (2 mg) by mouth 2 times daily                     calcium carbonate 500 MG chewable tablet  Also known as:  TUMS  INSTRUCTIONS:  Take 1 tablet (500 mg) by mouth daily as needed for heartburn                     calcium carbonate 600 mg-vitamin D 400 units 600-400 MG-UNIT per tablet  Also known as:  CALTRATE  INSTRUCTIONS:  Take 1 tablet by mouth 2 times daily (with meals)                     ceFEPIme 2 G vial  Also known as:  MAXIPIME  INSTRUCTIONS:  Inject 2 g into the vein every 12 hours for 28 days  Reason for med:  Bacteria in the Blood                     DULoxetine 20 MG capsule  Also known as:  CYMBALTA  INSTRUCTIONS:  Take 20 mg by mouth daily                     fluticasone 220 MCG/ACT inhaler  Also known as:  FLOVENT HFA  INSTRUCTIONS:  Inhale 2 puffs into the lungs 2 times daily                     fluticasone 50 MCG/ACT nasal spray  Also known as:  FLONASE  INSTRUCTIONS:  Spray 1 spray into both nostrils 2 times daily                     levothyroxine 100 MCG tablet  Also known as:  SYNTHROID/LEVOTHROID  INSTRUCTIONS:  Take 100 mcg by mouth daily                     magnesium oxide 400 MG tablet  Also known as:  MAG-OX  INSTRUCTIONS:  Take 1 tablet (400 mg) by mouth 2 times daily  Doctor's comments:  Increase in dose                     melatonin 3 MG tablet  INSTRUCTIONS:  Take 2 tablets (6 mg) by mouth At Bedtime                     micafungin 150 mg  INSTRUCTIONS:  Inject 150 mg into the vein every 24 hours for 28 days  Reason for med:  fungal infection                     multivitamin w/minerals tablet  INSTRUCTIONS:  Take 1 tablet by mouth daily                     mycophenolate 250 MG capsule  Also known as:  GENERIC EQUIVALENT  INSTRUCTIONS:  Take 4 capsules (1,000 mg) by mouth 2 times daily                     nystatin 004912 UNIT/ML suspension  Also known as:  MYCOSTATIN  INSTRUCTIONS:  Take 5 mLs (500,000 Units) by mouth 4 times daily  Reason for med:  Candidiasis Fungal Infection of the  Oropharynx                     omeprazole 40 MG DR capsule  Also known as:  priLOSEC  INSTRUCTIONS:  Take 1 capsule (40 mg) by mouth daily                     potassium chloride ER 20 MEQ CR tablet  Also known as:  K-DUR/KLOR-CON M  INSTRUCTIONS:  Take 1 tablet (20 mEq) by mouth 2 times daily                     predniSONE 5 MG tablet  Also known as:  DELTASONE  INSTRUCTIONS:  Take 5 mg by mouth 2 times daily.  Doctor's comments:  Please include the quantity of tablets and (strength) per dose in sig.                     rosuvastatin 10 MG tablet  Also known as:  CRESTOR  INSTRUCTIONS:  Take 1 tablet (10 mg) by mouth daily                     senna 8.6 MG tablet  Also known as:  SENOKOT  INSTRUCTIONS:  Take 2 tablets by mouth 2 times daily as needed for constipation                     SINGULAIR 10 MG tablet  INSTRUCTIONS:  Take 1 tablet (10 mg) by mouth At Bedtime  Generic drug:  montelukast                     sulfamethoxazole-trimethoprim 800-160 MG tablet  Also known as:  BACTRIM DS/SEPTRA DS  INSTRUCTIONS:  Three times weekly: Monday, Wednesday, Friday  Reason for med:  Preventative Medication Therapy Used Around Surgery                     * tacrolimus 0.5 MG capsule  Also known as:  GENERIC EQUIVALENT  INSTRUCTIONS:  Take one 0.5 mg capsule by mouth twice daily (will take total of 2.5mg in the morning and total of 2.5mg in the evening)                     * tacrolimus 1 MG capsule  Also known as:  GENERIC EQUIVALENT  INSTRUCTIONS:  Take 2.5 mg by mouth  (2 x 1 mg + 1 x 0.5 mg) in the morning and  in the evening                     valGANciclovir 450 MG tablet  Also known as:  VALCYTE  INSTRUCTIONS:  Take 1 tablet (450 mg) by mouth daily  Reason for med:  Medication Treatment to Prevent Cytomegalovirus Disease                        * This list has 2 medication(s) that are the same as other medications prescribed for you. Read the directions carefully, and ask your doctor or other care provider to review them  with you.

## 2019-05-22 NOTE — IP AVS SNAPSHOT
[address] [address]                                    After Visit Summary   5/22/2019    Everton Larios    MRN: [id number]           After Visit Summary Signature Page    I have received my discharge instructions, and my questions have been answered. I have discussed any challenges I see with this plan with the nurse or doctor.    ..........................................................................................................................................  Patient/Patient Representative Signature      ..........................................................................................................................................  Patient Representative Print Name and Relationship to Patient    ..................................................               ................................................  Date                                   Time    ..........................................................................................................................................  Reviewed by Signature/Title    ...................................................              ..............................................  Date                                               Time          22EPIC Rev 08/18

## 2019-05-23 ENCOUNTER — INFUSION - HEALTHEAST (OUTPATIENT)
Dept: INFUSION THERAPY | Facility: HOSPITAL | Age: 54
End: 2019-05-23

## 2019-05-23 DIAGNOSIS — R91.8 PULMONARY NODULES: ICD-10-CM

## 2019-05-23 DIAGNOSIS — A41.52 SEPSIS DUE TO PSEUDOMONAS AERUGINOSA (H): ICD-10-CM

## 2019-05-23 LAB
CMV DNA SPEC NAA+PROBE-ACNC: NORMAL [IU]/ML
CMV DNA SPEC NAA+PROBE-LOG#: NORMAL {LOG_IU}/ML
COPATH REPORT: NORMAL
DONOR IDENTIFICATION: NORMAL
DSA COMMENTS: NORMAL
DSA PRESENT: NO
DSA TEST METHOD: NORMAL
ORGAN: NORMAL
SA1 CELL: NORMAL
SA1 COMMENTS: NORMAL
SA1 HI RISK ABY: NORMAL
SA1 MOD RISK ABY: NORMAL
SA1 TEST METHOD: NORMAL
SA2 CELL: NORMAL
SA2 COMMENTS: NORMAL
SA2 HI RISK ABY UA: NORMAL
SA2 MOD RISK ABY: NORMAL
SA2 TEST METHOD: NORMAL
SPECIMEN SOURCE: NORMAL
UNACCEPTABLE ANTIGEN: NORMAL
UNOS CPRA: 0

## 2019-05-24 ENCOUNTER — HOSPITAL ENCOUNTER (EMERGENCY)
Facility: CLINIC | Age: 54
End: 2019-05-24
Payer: COMMERCIAL

## 2019-05-24 ENCOUNTER — INFUSION - HEALTHEAST (OUTPATIENT)
Dept: INFUSION THERAPY | Facility: HOSPITAL | Age: 54
End: 2019-05-24

## 2019-05-24 ENCOUNTER — RECORDS - HEALTHEAST (OUTPATIENT)
Dept: ADMINISTRATIVE | Facility: OTHER | Age: 54
End: 2019-05-24

## 2019-05-24 ENCOUNTER — CARE COORDINATION (OUTPATIENT)
Dept: TRANSPLANT | Facility: CLINIC | Age: 54
End: 2019-05-24

## 2019-05-24 ENCOUNTER — INFUSION THERAPY VISIT (OUTPATIENT)
Dept: INFUSION THERAPY | Facility: CLINIC | Age: 54
DRG: 683 | End: 2019-05-24
Attending: INTERNAL MEDICINE
Payer: COMMERCIAL

## 2019-05-24 ENCOUNTER — HOSPITAL ENCOUNTER (INPATIENT)
Facility: CLINIC | Age: 54
LOS: 2 days | Discharge: HOME | DRG: 683 | End: 2019-05-26
Attending: INTERNAL MEDICINE | Admitting: INTERNAL MEDICINE
Payer: COMMERCIAL

## 2019-05-24 ENCOUNTER — OFFICE VISIT (OUTPATIENT)
Dept: CARDIOLOGY | Facility: CLINIC | Age: 54
DRG: 683 | End: 2019-05-24
Attending: INTERNAL MEDICINE
Payer: COMMERCIAL

## 2019-05-24 VITALS
HEART RATE: 102 BPM | DIASTOLIC BLOOD PRESSURE: 83 MMHG | OXYGEN SATURATION: 100 % | WEIGHT: 131.4 LBS | BODY MASS INDEX: 19.91 KG/M2 | HEIGHT: 68 IN | SYSTOLIC BLOOD PRESSURE: 129 MMHG

## 2019-05-24 DIAGNOSIS — I10 BENIGN ESSENTIAL HYPERTENSION: Primary | ICD-10-CM

## 2019-05-24 DIAGNOSIS — A41.52 SEPSIS DUE TO PSEUDOMONAS AERUGINOSA (H): ICD-10-CM

## 2019-05-24 DIAGNOSIS — Z94.1 HEART REPLACED BY TRANSPLANT (H): ICD-10-CM

## 2019-05-24 DIAGNOSIS — T82.594A OBSTRUCTION OF CENTRAL LINE, INITIAL ENCOUNTER (H): Primary | ICD-10-CM

## 2019-05-24 DIAGNOSIS — R91.8 PULMONARY NODULES: ICD-10-CM

## 2019-05-24 DIAGNOSIS — Z94.1 S/P ORTHOTOPIC HEART TRANSPLANT (H): ICD-10-CM

## 2019-05-24 DIAGNOSIS — D72.9 ABNORMAL WHITE BLOOD CELL (WBC) COUNT: ICD-10-CM

## 2019-05-24 PROBLEM — N17.9 AKI (ACUTE KIDNEY INJURY) (H): Status: ACTIVE | Noted: 2019-05-24

## 2019-05-24 LAB
ALBUMIN SERPL-MCNC: 3.2 G/DL (ref 3.4–5)
ALP SERPL-CCNC: 113 U/L (ref 40–150)
ALT SERPL W P-5'-P-CCNC: 18 U/L (ref 0–70)
ANION GAP SERPL CALCULATED.3IONS-SCNC: 11 MMOL/L (ref 3–14)
AST SERPL W P-5'-P-CCNC: 13 U/L (ref 0–45)
BASOPHILS # BLD AUTO: 0.1 10E9/L (ref 0–0.2)
BASOPHILS NFR BLD AUTO: 1.2 %
BILIRUB SERPL-MCNC: 0.4 MG/DL (ref 0.2–1.3)
BUN SERPL-MCNC: 63 MG/DL (ref 7–30)
CALCIUM SERPL-MCNC: 9.2 MG/DL (ref 8.5–10.1)
CHLORIDE SERPL-SCNC: 102 MMOL/L (ref 94–109)
CO2 SERPL-SCNC: 21 MMOL/L (ref 20–32)
CREAT SERPL-MCNC: 3.43 MG/DL (ref 0.66–1.25)
DIFFERENTIAL METHOD BLD: ABNORMAL
EBV DNA # SPEC NAA+PROBE: 541 {COPIES}/ML
EBV DNA SPEC NAA+PROBE-LOG#: 2.7 {LOG_COPIES}/ML
EOSINOPHIL # BLD AUTO: 0.1 10E9/L (ref 0–0.7)
EOSINOPHIL NFR BLD AUTO: 1.3 %
ERYTHROCYTE [DISTWIDTH] IN BLOOD BY AUTOMATED COUNT: 16.9 % (ref 10–15)
GFR SERPL CREATININE-BSD FRML MDRD: 19 ML/MIN/{1.73_M2}
GLUCOSE SERPL-MCNC: 164 MG/DL (ref 70–99)
HCT VFR BLD AUTO: 28.1 % (ref 40–53)
HGB BLD-MCNC: 9 G/DL (ref 13.3–17.7)
IMM GRANULOCYTES # BLD: 0.1 10E9/L (ref 0–0.4)
IMM GRANULOCYTES NFR BLD: 1.1 %
LAB SCANNED RESULT: NORMAL
LYMPHOCYTES # BLD AUTO: 0.8 10E9/L (ref 0.8–5.3)
LYMPHOCYTES NFR BLD AUTO: 9 %
MCH RBC QN AUTO: 30.6 PG (ref 26.5–33)
MCHC RBC AUTO-ENTMCNC: 32 G/DL (ref 31.5–36.5)
MCV RBC AUTO: 96 FL (ref 78–100)
MONOCYTES # BLD AUTO: 0.2 10E9/L (ref 0–1.3)
MONOCYTES NFR BLD AUTO: 1.7 %
NEUTROPHILS # BLD AUTO: 7.6 10E9/L (ref 1.6–8.3)
NEUTROPHILS NFR BLD AUTO: 85.7 %
NRBC # BLD AUTO: 0 10*3/UL
NRBC BLD AUTO-RTO: 0 /100
PLATELET # BLD AUTO: 191 10E9/L (ref 150–450)
POTASSIUM SERPL-SCNC: 4.1 MMOL/L (ref 3.4–5.3)
PROT SERPL-MCNC: 7.2 G/DL (ref 6.8–8.8)
RBC # BLD AUTO: 2.94 10E12/L (ref 4.4–5.9)
SODIUM SERPL-SCNC: 134 MMOL/L (ref 133–144)
WBC # BLD AUTO: 8.9 10E9/L (ref 4–11)

## 2019-05-24 PROCEDURE — 12000001 ZZH R&B MED SURG/OB UMMC

## 2019-05-24 PROCEDURE — 80053 COMPREHEN METABOLIC PANEL: CPT | Performed by: INTERNAL MEDICINE

## 2019-05-24 PROCEDURE — 99239 HOSP IP/OBS DSCHRG MGMT >30: CPT | Performed by: INTERNAL MEDICINE

## 2019-05-24 PROCEDURE — 25000128 H RX IP 250 OP 636: Mod: ZF | Performed by: INTERNAL MEDICINE

## 2019-05-24 PROCEDURE — 99215 OFFICE O/P EST HI 40 MIN: CPT | Mod: ZP | Performed by: INTERNAL MEDICINE

## 2019-05-24 PROCEDURE — 85025 COMPLETE CBC W/AUTO DIFF WBC: CPT | Performed by: INTERNAL MEDICINE

## 2019-05-24 PROCEDURE — 25000128 H RX IP 250 OP 636: Performed by: INTERNAL MEDICINE

## 2019-05-24 RX ORDER — HEPARIN SODIUM,PORCINE 10 UNIT/ML
5 VIAL (ML) INTRAVENOUS ONCE
Status: DISCONTINUED | OUTPATIENT
Start: 2019-05-24 | End: 2019-06-01 | Stop reason: HOSPADM

## 2019-05-24 RX ORDER — PREDNISONE 5 MG/1
5 TABLET ORAL DAILY
Status: DISCONTINUED | OUTPATIENT
Start: 2019-05-25 | End: 2019-05-26 | Stop reason: HOSPADM

## 2019-05-24 RX ORDER — POTASSIUM CHLORIDE 750 MG/1
20 TABLET, EXTENDED RELEASE ORAL DAILY
Status: DISCONTINUED | OUTPATIENT
Start: 2019-05-25 | End: 2019-05-25

## 2019-05-24 RX ORDER — POLYETHYLENE GLYCOL 3350 17 G/17G
17 POWDER, FOR SOLUTION ORAL DAILY PRN
Status: DISCONTINUED | OUTPATIENT
Start: 2019-05-24 | End: 2019-05-26 | Stop reason: HOSPADM

## 2019-05-24 RX ORDER — BUMETANIDE 1 MG/1
1 TABLET ORAL
Qty: 30 TABLET | Refills: 1 | Status: ON HOLD | OUTPATIENT
Start: 2019-05-24 | End: 2019-05-26

## 2019-05-24 RX ORDER — LEVOTHYROXINE SODIUM 25 UG/1
100 TABLET ORAL DAILY
Status: DISCONTINUED | OUTPATIENT
Start: 2019-05-25 | End: 2019-05-26 | Stop reason: HOSPADM

## 2019-05-24 RX ORDER — AMOXICILLIN 250 MG
1 CAPSULE ORAL 2 TIMES DAILY
Status: DISCONTINUED | OUTPATIENT
Start: 2019-05-24 | End: 2019-05-26 | Stop reason: HOSPADM

## 2019-05-24 RX ORDER — PROCHLORPERAZINE MALEATE 5 MG
10 TABLET ORAL EVERY 6 HOURS PRN
Status: DISCONTINUED | OUTPATIENT
Start: 2019-05-24 | End: 2019-05-26 | Stop reason: HOSPADM

## 2019-05-24 RX ORDER — MONTELUKAST SODIUM 10 MG/1
10 TABLET ORAL AT BEDTIME
Status: DISCONTINUED | OUTPATIENT
Start: 2019-05-24 | End: 2019-05-26 | Stop reason: HOSPADM

## 2019-05-24 RX ORDER — ALBUTEROL SULFATE 90 UG/1
2 AEROSOL, METERED RESPIRATORY (INHALATION) EVERY 6 HOURS PRN
Status: DISCONTINUED | OUTPATIENT
Start: 2019-05-24 | End: 2019-05-26 | Stop reason: HOSPADM

## 2019-05-24 RX ORDER — PROCHLORPERAZINE 25 MG
25 SUPPOSITORY, RECTAL RECTAL EVERY 12 HOURS PRN
Status: DISCONTINUED | OUTPATIENT
Start: 2019-05-24 | End: 2019-05-26 | Stop reason: HOSPADM

## 2019-05-24 RX ORDER — ACETAMINOPHEN 325 MG/1
650 TABLET ORAL EVERY 4 HOURS PRN
Status: DISCONTINUED | OUTPATIENT
Start: 2019-05-24 | End: 2019-05-26 | Stop reason: HOSPADM

## 2019-05-24 RX ORDER — ONDANSETRON 2 MG/ML
4 INJECTION INTRAMUSCULAR; INTRAVENOUS EVERY 6 HOURS PRN
Status: DISCONTINUED | OUTPATIENT
Start: 2019-05-24 | End: 2019-05-26 | Stop reason: HOSPADM

## 2019-05-24 RX ORDER — ASPIRIN 81 MG/1
81 TABLET ORAL DAILY
Status: DISCONTINUED | OUTPATIENT
Start: 2019-05-25 | End: 2019-05-25

## 2019-05-24 RX ORDER — ASPIRIN 81 MG/1
81 TABLET, CHEWABLE ORAL DAILY
Status: DISCONTINUED | OUTPATIENT
Start: 2019-05-25 | End: 2019-05-26 | Stop reason: HOSPADM

## 2019-05-24 RX ORDER — FLUTICASONE PROPIONATE 50 MCG
1 SPRAY, SUSPENSION (ML) NASAL 2 TIMES DAILY
Status: DISCONTINUED | OUTPATIENT
Start: 2019-05-24 | End: 2019-05-26 | Stop reason: HOSPADM

## 2019-05-24 RX ORDER — BISACODYL 10 MG
10 SUPPOSITORY, RECTAL RECTAL DAILY PRN
Status: DISCONTINUED | OUTPATIENT
Start: 2019-05-24 | End: 2019-05-26 | Stop reason: HOSPADM

## 2019-05-24 RX ORDER — DULOXETIN HYDROCHLORIDE 20 MG/1
20 CAPSULE, DELAYED RELEASE ORAL DAILY
Status: DISCONTINUED | OUTPATIENT
Start: 2019-05-25 | End: 2019-05-26 | Stop reason: HOSPADM

## 2019-05-24 RX ORDER — NITROGLYCERIN 0.4 MG/1
0.4 TABLET SUBLINGUAL EVERY 5 MIN PRN
Status: DISCONTINUED | OUTPATIENT
Start: 2019-05-24 | End: 2019-05-26 | Stop reason: HOSPADM

## 2019-05-24 RX ORDER — PENTAMIDINE ISETHIONATE 300 MG/300MG
300 INHALANT RESPIRATORY (INHALATION) ONCE
Status: CANCELLED | OUTPATIENT
Start: 2019-06-21

## 2019-05-24 RX ORDER — ALBUTEROL SULFATE 0.83 MG/ML
2.5 SOLUTION RESPIRATORY (INHALATION) ONCE
Status: CANCELLED | OUTPATIENT
Start: 2019-06-21

## 2019-05-24 RX ORDER — ONDANSETRON 4 MG/1
4 TABLET, ORALLY DISINTEGRATING ORAL EVERY 6 HOURS PRN
Status: DISCONTINUED | OUTPATIENT
Start: 2019-05-24 | End: 2019-05-26 | Stop reason: HOSPADM

## 2019-05-24 RX ORDER — AMOXICILLIN 250 MG
2 CAPSULE ORAL 2 TIMES DAILY
Status: DISCONTINUED | OUTPATIENT
Start: 2019-05-24 | End: 2019-05-26 | Stop reason: HOSPADM

## 2019-05-24 RX ORDER — PREDNISONE 5 MG/1
5 TABLET ORAL DAILY
Qty: 30 TABLET | Refills: 0 | Status: SHIPPED | OUTPATIENT
Start: 2019-05-24 | End: 2019-06-07

## 2019-05-24 RX ORDER — MYCOPHENOLATE MOFETIL 250 MG/1
1500 CAPSULE ORAL 2 TIMES DAILY
Qty: 360 CAPSULE | Refills: 11 | Status: SHIPPED | OUTPATIENT
Start: 2019-05-24 | End: 2020-06-09

## 2019-05-24 RX ORDER — LIDOCAINE 40 MG/G
CREAM TOPICAL
Status: DISCONTINUED | OUTPATIENT
Start: 2019-05-24 | End: 2019-05-26 | Stop reason: HOSPADM

## 2019-05-24 RX ORDER — ACETAMINOPHEN 650 MG/1
650 SUPPOSITORY RECTAL EVERY 4 HOURS PRN
Status: DISCONTINUED | OUTPATIENT
Start: 2019-05-24 | End: 2019-05-26 | Stop reason: HOSPADM

## 2019-05-24 RX ORDER — MYCOPHENOLATE MOFETIL 250 MG/1
1500 CAPSULE ORAL 2 TIMES DAILY
Status: DISCONTINUED | OUTPATIENT
Start: 2019-05-24 | End: 2019-05-26 | Stop reason: HOSPADM

## 2019-05-24 RX ORDER — TACROLIMUS 1 MG/1
2 CAPSULE ORAL EVERY 12 HOURS
Status: DISCONTINUED | OUTPATIENT
Start: 2019-05-24 | End: 2019-05-25

## 2019-05-24 RX ORDER — LANOLIN ALCOHOL/MO/W.PET/CERES
6 CREAM (GRAM) TOPICAL AT BEDTIME
Status: DISCONTINUED | OUTPATIENT
Start: 2019-05-24 | End: 2019-05-26 | Stop reason: HOSPADM

## 2019-05-24 RX ORDER — AMOXICILLIN 250 MG
1 CAPSULE ORAL 2 TIMES DAILY PRN
Status: DISCONTINUED | OUTPATIENT
Start: 2019-05-24 | End: 2019-05-26 | Stop reason: HOSPADM

## 2019-05-24 RX ORDER — POTASSIUM CHLORIDE 1500 MG/1
20 TABLET, EXTENDED RELEASE ORAL DAILY
Qty: 120 TABLET | Refills: 0 | Status: SHIPPED | OUTPATIENT
Start: 2019-05-24 | End: 2019-08-13

## 2019-05-24 RX ORDER — ROSUVASTATIN CALCIUM 10 MG/1
10 TABLET, COATED ORAL DAILY
Status: DISCONTINUED | OUTPATIENT
Start: 2019-05-25 | End: 2019-05-26 | Stop reason: HOSPADM

## 2019-05-24 RX ORDER — NYSTATIN 100000/ML
500000 SUSPENSION, ORAL (FINAL DOSE FORM) ORAL 4 TIMES DAILY
Status: DISCONTINUED | OUTPATIENT
Start: 2019-05-25 | End: 2019-05-26 | Stop reason: HOSPADM

## 2019-05-24 RX ORDER — AMOXICILLIN 250 MG
2 CAPSULE ORAL 2 TIMES DAILY PRN
Status: DISCONTINUED | OUTPATIENT
Start: 2019-05-24 | End: 2019-05-26 | Stop reason: HOSPADM

## 2019-05-24 RX ORDER — ALUMINA, MAGNESIA, AND SIMETHICONE 2400; 2400; 240 MG/30ML; MG/30ML; MG/30ML
30 SUSPENSION ORAL EVERY 4 HOURS PRN
Status: DISCONTINUED | OUTPATIENT
Start: 2019-05-24 | End: 2019-05-26 | Stop reason: HOSPADM

## 2019-05-24 RX ADMIN — ALTEPLASE 2 MG: 2.2 INJECTION, POWDER, LYOPHILIZED, FOR SOLUTION INTRAVENOUS at 15:27

## 2019-05-24 ASSESSMENT — ENCOUNTER SYMPTOMS
VOMITING: 0
WEIGHT LOSS: 0
NECK MASS: 0
SINUS PAIN: 0
HYPOTENSION: 0
DYSURIA: 0
FATIGUE: 1
DECREASED CONCENTRATION: 0
INCREASED ENERGY: 0
NAIL CHANGES: 0
ALTERED TEMPERATURE REGULATION: 1
DECREASED APPETITE: 1
SINUS CONGESTION: 1
POLYDIPSIA: 1
SKIN CHANGES: 0
INCREASED ENERGY: 0
NIGHT SWEATS: 1
POOR WOUND HEALING: 0
TROUBLE SWALLOWING: 0
LIGHT-HEADEDNESS: 0
EXERCISE INTOLERANCE: 1
INSOMNIA: 0
WEIGHT GAIN: 1
DEPRESSION: 1
NIGHT SWEATS: 1
TROUBLE SWALLOWING: 0
LEG PAIN: 1
HYPERTENSION: 1
ALTERED TEMPERATURE REGULATION: 1
HALLUCINATIONS: 0
CHILLS: 1
HALLUCINATIONS: 0
NERVOUS/ANXIOUS: 1
PANIC: 0
SINUS PAIN: 0
CHILLS: 1
SINUS CONGESTION: 1
HOARSE VOICE: 1
SMELL DISTURBANCE: 0
POLYPHAGIA: 0
POLYDIPSIA: 1
SORE THROAT: 0
INSOMNIA: 0
SLEEP DISTURBANCES DUE TO BREATHING: 0
LIGHT-HEADEDNESS: 0
HYPOTENSION: 0
HOARSE VOICE: 1
VOMITING: 0
HYPERTENSION: 1
SLEEP DISTURBANCES DUE TO BREATHING: 0
NECK MASS: 0
POLYPHAGIA: 0
WEIGHT GAIN: 1
SMELL DISTURBANCE: 0
DECREASED CONCENTRATION: 0
DYSURIA: 0
DECREASED APPETITE: 1
POOR WOUND HEALING: 0
NERVOUS/ANXIOUS: 1
WEIGHT LOSS: 0
LEG PAIN: 1
EXERCISE INTOLERANCE: 1
SKIN CHANGES: 0
NAIL CHANGES: 0
FATIGUE: 1
SORE THROAT: 0
PANIC: 0
DEPRESSION: 1

## 2019-05-24 ASSESSMENT — MIFFLIN-ST. JEOR
SCORE: 1371.5
SCORE: 1400.53

## 2019-05-24 ASSESSMENT — PAIN SCALES - GENERAL: PAINLEVEL: NO PAIN (0)

## 2019-05-24 NOTE — NURSING NOTE
"Patient seen in clinic for routine 3 month post heart transplant visit with Dr. Donis. RHC (mod elevated L sided and LV pressures), Hbx (negative, NER, continue pred 5 mg until after repeat echo).  Resulted labs reviewed. Cr bumped to 2.43, patient reports forgetting to take a dose of bumex last weekend and varying doses of 2/2 and 1/2 based on fluid status over the past week, plan to take bumex 1 mg bid through Tuesday 5/28 and recheck Cr today after clinic, On-call Coordinator to report CMP results to Dr. Donis this evening and patient will be updated if any changes need to be made to meds. EBV and allomap pending. VSS. Today /83 and . Denies pain. Denies chest pain, shortness of breath, fatigue, fever, night sweats. Weight stable, up a couple lbs today. C/o \"throat-clearing\" cough, improving. C/o nasal congestion and post-nasal drainage likely r/t seasonal allergies. C/o constipation with decreased appetite over th past few days, ran out of senna, good BM this AM. Other med changes: KCL 20 mEq (while on 1 mg bid of bumex), discontinue valcyte when pills run out, increase MMF to 1500 mg bid. TPA to PICC line prior to clinic visit today and now dwelling, instructions given to infusion center to call on-call txp coordinator if no blood return after dwell time. Next steps: labs, echo and ID f/u 5/28, patient currently not taking bactrim, discuss bactrim and/or pentamidine re-start or stop at ID visit. Discuss pred discontinue with Dr. Donis after echo results. Rechecking tacro level on 5/29 locally, reminded patient to use  kit. RTC 6/18 for 4-month follow-up appt. Txp. Coordinator to call with pending results.  Patient s questions/concerns addressed. AVS printed. Patient verbalized understanding and next steps.     Patient instructions:  1. Increase mycophenolate to 1500 mg two times daily.  2. Go to lab after clinic today to recheck Creatinine. Take bumex 1 mg (1/2 tablet) two times daily " through Tuesday, please call us if you notice weight gain or extra fluid, the on-call Coordinator will call if Dr. Donis has different instructions for the weekend.  3. Take KCL 20 mEq daily while on this bumex dose.  4. Let us know if you have further issues with PICC line over the weekend.   5. After you run out of valcyte ok to stop.  6. Please check tacro level early next week, remember to bring  kit if you go to outside lab.  Labs, Echo and ID appointments scheduled for 5/28.  Return to clinic 6/18 for 4-month appointment.  Your Transplant Coordinator will call with pending results.  Please call your Transplant Coordinator with questions/concerns [phone].

## 2019-05-24 NOTE — NURSING NOTE
No chief complaint on file.    Vitals were taken and medications were reconciled.    Alonso Puente    3:00 PM

## 2019-05-24 NOTE — PATIENT INSTRUCTIONS
1. Increase mycophenolate to 1500 mg two times daily.  2. Go to lab after clinic today to recheck Creatinine. Take bumex 1 mg (1/2 tablet) two times daily through Tuesday, please call us if you notice weight gain or extra fluid, the on-call Coordinator will call if Dr. Donis has different instructions for the weekend.  3. Take KCL 20 mEq daily while on this bumex dose.  4. Let us know if you have further issues with PICC line over the weekend.   5. After you run out of valcyte ok to stop.  6. Please check tacro level early next week, remember to bring  kit if you go to outside lab.    Labs, Echo and ID appointments scheduled for 5/28.    Return to clinic 6/18 for 4-month appointment.    Your Transplant Coordinator will call with pending results.  Please call your Transplant Coordinator with questions/concerns [phone].

## 2019-05-24 NOTE — LETTER
5/24/2019      RE: Everton Larios  [address] [facility] [address]       Dear Colleague,    Thank you for the opportunity to participate in the care of your patient, Everton Larios, at the [facility]. Please see a copy of my visit note below.    [facility]    HPI:   Mr. Larios is a 54 year old male who presents to clinic today for routine heart transplant follow-up. Patient has history of ASD (s/p repair in childhood), AFib/AF (s/p ablation), NICM, diastolic dysfunction, alcohol abuse, Pierce's esophagus, ulcerative colitis, GIB (s/p splenic embolization), and hypothyroidism, now s/p orthotopic heart transplant and bypass of persistent left SVC to right atrial appendage 2/24/19. Postop course c/b shock due to RV dysfunction requiring IABP, small pneumoperitoneum, chest wall hematoma (former ICD site, s/p evacuation and drain placement 3/31/19), HCAP/klebsiella pneumonia, and FRANKLIN (required short-term HD, now recovered). He was transferred to rehab 4/3, and ultimately discharged 4/15. Biopsy 3/25/19 showed mild AMR1 with some staining for c4d. Treatment deferred and repeat biopsy 3/28 showed improved AMR and less reactive capillary staining, and subsequent biopsies have now shown resolution of AMR and improved capillary staining.  Baseline coronary angiogram has been deferred due to renal function. Last echo 3/26 showed moderate concentric wall thickening c/w LVH, with LVEF 60-65% and moderately dilated RV with mild-moderate RV dysfunction. He had a fall in early April and found to have compression fracture. Most recently, patient admitted 4/28-5/9 for fevers, URI symptoms, abdominal pain and diarrhea. Found to be neutropenia with pseudomonas bacteremia. CT chest revealed diffuse bilateral solid with peripheral ground glass pulmonary nodules/opacities and mediastinal adenopathy, bronch with BAL grew pseudomonas.  Transplant ID was consulted. fungitell positive so concern for fungal component as well. He was discharged on IV cefepime and micafungin with plans for repeat chest CT (compelted this week) and f/u with transplant ID appt (scheduled for next week).    RHC, echo and biopsy were obtained on Wednesday.  RHC revealed good cardiac output with acceptable filling pressures but echo noted decrease in LVEF.  Given recent infection with ongoing treatment and preserved cardiac output, decision made not to treat change in LVEF while awaiting biopsy and DSA results.  Yesterday, biopsy results revealed no evidence of significant rejection and confirmed no DSAs.  Plan made to repeat echo early next week.     Patient reports that over the past several days overall he has feeling well.  He has had no further fever, chills, nausea, vomiting, or diarrhea. Denies any exertional chest pain, shortness of breath, fatigue, lightheadedness, orthopnea, PND, or edema.  He has been self-adjusting his diuretics over the past several days.        PAST MEDICAL HISTORY:  Past Medical History:   Diagnosis Date     Alcohol abuse      Pierce's esophagus 10/4/2018     Chronic rhinitis 10/4/2018     Chronic systolic heart failure (H) 10/4/2018     Depression 10/4/2018     Diastolic dysfunction      DJD (degenerative joint disease) - neck 10/4/2018     H/O congenital atrial septal defect (ASD) repair at age 3 10/4/2018     Hypothyroidism due to medication 10/4/2018     ICD, [employer] 2008; gen change 2/2018 10/4/2018     Intermittent asthma without complication 10/4/2018     Nonischemic cardiomyopathy (H) 10/4/2018     On amiodarone therapy 10/4/2018     Paroxysmal atrial fibrillation (H) 10/4/2018     S/P ablation of atrial fibrillation 10/4/2018     Systolic heart failure (H)      Ulcerative colitis (H) 10/4/2018     Ventricular tachycardia (H) 10/4/2018       FAMILY HISTORY:  Family History   Problem Relation Age of Onset     Endometrial  Cancer Mother      Hypertension Father      Endometrial Cancer Maternal Grandmother        SOCIAL HISTORY:  Socioeconomic History     Marital status: Single   Tobacco Use     Smoking status: Former Smoker     Years: 10.00     Smokeless tobacco: Never Used   Substance and Sexual Activity     Alcohol use: Yes     Alcohol/week: 0.6 oz     Types: 1 Cans of beer per week     Comment: a couple times a week      Drug use: No   Social History Narrative    Everton is a retired . He lives by himself in a townhouse in [place]. He has no lawn care responsibilities. He will be staying with his folks during his post-hospital early transplant care time. No history of TB exposures.        CURRENT MEDICATIONS:    Current Outpatient Medications on File Prior to Visit:  acetaminophen (TYLENOL) 325 MG tablet Take 2 tablets (650 mg) by mouth every 4 hours as needed for mild pain   albuterol (PROAIR HFA/PROVENTIL HFA/VENTOLIN HFA) 108 (90 Base) MCG/ACT inhaler Inhale 2 puffs into the lungs every 6 hours as needed for shortness of breath / dyspnea or wheezing   aspirin (ASA) 81 MG chewable tablet Take 1 tablet (81 mg) by mouth daily   balsalazide (COLAZAL) 750 MG capsule TAKE 2 CAPSULES BY MOUTH TWICE DAILY   calcium carbonate (TUMS) 500 MG chewable tablet Take 1 tablet (500 mg) by mouth daily as needed for heartburn   calcium carbonate 600 mg-vitamin D 400 units (CALTRATE) 600-400 MG-UNIT per tablet Take 1 tablet by mouth 2 times daily (with meals)   ceFEPIme (MAXIPIME) 2 G vial Inject 2 g into the vein every 12 hours for 28 days   DULoxetine (CYMBALTA) 20 MG EC capsule Take 20 mg by mouth daily   fluticasone (FLONASE) 50 MCG/ACT nasal spray Spray 1 spray into both nostrils 2 times daily   fluticasone (FLOVENT HFA) 220 MCG/ACT Inhaler Inhale 2 puffs into the lungs 2 times daily   levothyroxine (SYNTHROID/LEVOTHROID) 100 MCG tablet Take 100 mcg by mouth daily    magnesium oxide (MAG-OX) 400 MG tablet Take 1 tablet (400 mg)  "by mouth 2 times daily   melatonin 3 MG tablet Take 2 tablets (6 mg) by mouth At Bedtime   micafungin 150 mg Inject 150 mg into the vein every 24 hours for 28 days   montelukast (SINGULAIR) 10 MG tablet Take 1 tablet (10 mg) by mouth At Bedtime   multivitamin w/minerals (THERA-VIT-M) tablet Take 1 tablet by mouth daily   mycophenolate (GENERIC EQUIVALENT) 250 MG capsule Take 4 capsules (1,000 mg) by mouth 2 times daily   nystatin (MYCOSTATIN) 913444 UNIT/ML suspension Take 5 mLs (500,000 Units) by mouth 4 times daily   omeprazole (PRILOSEC) 40 MG DR capsule Take 1 capsule (40 mg) by mouth daily   potassium chloride ER (K-DUR/KLOR-CON M) 20 MEQ CR tablet Take 1 tablet (20 mEq) by mouth 2 times daily   predniSONE (DELTASONE) 5 MG tablet Take 5 mg by mouth 2 times daily. (Patient taking differently: Take 5 mg by mouth daily .)   rosuvastatin (CRESTOR) 10 MG tablet Take 1 tablet (10 mg) by mouth daily   senna (SENOKOT) 8.6 MG tablet Take 2 tablets by mouth 2 times daily as needed for constipation   tacrolimus (GENERIC EQUIVALENT) 1 MG capsule Take 2 mg by mouth  in the morning and  3 mg  in the evening   valGANciclovir (VALCYTE) 450 MG tablet Take 1 tablet (450 mg) by mouth daily   bumetanide (BUMEX) 2 MG tablet Take 1 tablet (2 mg) by mouth 2 times daily   sulfamethoxazole-trimethoprim (BACTRIM DS/SEPTRA DS) 800-160 MG tablet Three times weekly: Monday, Wednesday, Friday (Patient not taking: Reported on 5/24/2019)     No current facility-administered medications on file prior to visit.     ROS:  See HPI    EXAM:  /83 (BP Location: Left arm, Patient Position: Chair, Cuff Size: Adult Regular)   Pulse 102   Ht 1.727 m (5' 8\")   Wt 59.6 kg (131 lb 6.4 oz)   SpO2 100%   BMI 19.98 kg/m     GENERAL: Appears comfortable, in no acute distress, thin.   HEENT: Eye symmetrical, no discharge or icterus bilaterally. No thrush.   CV: Tachycardic and regular, +S1S2, no murmur, rub, or gallop. JVP not visible at 45 degrees. "   RESPIRATORY: Respirations regular, even, and unlabored. Lungs CTA throughout, good air movement.  GI: Soft, thin and non distended with normoactive bowel sounds present in all quadrants. No tenderness, rebound, guarding. No hepatomegaly.   EXTREMITIES: Np peripheral edema. 2+ bilateral pedal pulses.   NEUROLOGIC: Alert and oriented x 3. No focal deficits.   MUSCULOSKELETAL: No joint swelling or tenderness.   SKIN: No jaundice. Thin skin noted with small skin tears on hands. Acne consistent with steroid use.       Labs - to be drawn after clinic given issue with PICC line  Labs from Wednesday  CBC RESULTS:  Lab Results   Component Value Date    WBC 9.0 05/22/2019    RBC 3.33 (L) 05/22/2019    HGB 9.7 (L) 05/22/2019    HCT 32.0 (L) 05/22/2019    MCV 96 05/22/2019    MCH 29.1 05/22/2019    MCHC 30.3 (L) 05/22/2019    RDW 17.4 (H) 05/22/2019     05/22/2019       CMP RESULTS:  Lab Results   Component Value Date     05/22/2019    POTASSIUM 3.7 05/22/2019    CHLORIDE 102 05/22/2019    CO2 25 05/22/2019    ANIONGAP 10 05/22/2019    GLC 82 05/22/2019    BUN 53 (H) 05/22/2019    CR 2.43 (H) 05/22/2019    GFRESTIMATED 29 (L) 05/22/2019    GFRESTBLACK 33 (L) 05/22/2019    RADAMES 9.3 05/22/2019    BILITOTAL 0.4 05/22/2019    ALBUMIN 3.5 05/22/2019    ALKPHOS 109 05/22/2019    ALT 20 05/22/2019    AST 15 05/22/2019        INR RESULTS:  Lab Results   Component Value Date    INR 1.22 (H) 05/08/2019       LIPID RESULTS:  Lab Results   Component Value Date    CHOL 204 (H) 05/22/2019    HDL 85 05/22/2019    LDL 84 05/22/2019    TRIG 175 (H) 05/22/2019       IMMUNOSUPPRESSANT LEVELS:  Lab Results   Component Value Date    TACROL 7.9 05/22/2019    DOSTAC Not Provided 05/22/2019       No components found for: CK  Lab Results   Component Value Date    MAG 1.9 05/22/2019     Lab Results   Component Value Date    A1C 5.7 (H) 02/23/2019     Lab Results   Component Value Date    PHOS 4.2 05/22/2019     Lab Results   Component  Value Date    NTBNP 10,986 (H) 11/15/2018     Lab Results   Component Value Date    SAITESTMET [facility] 05/22/2019    SAICELL Class I 05/22/2019    [id number] None 05/22/2019    [id number] None 05/22/2019    SAIREPCOM  05/22/2019      Test performed by modified procedure. Serum heat inactivated and tested   by a modified ([place]) protocol including fetal calf serum addition.   High-risk, mfi >3,000. Mod-risk, mfi 500-3,000.       Lab Results   Component Value Date    SAIITESTME [facility] 05/22/2019    SAIICELL Class II 05/22/2019    [id number] None 05/22/2019    [id number] None 05/22/2019    SAIIREPCOM  05/22/2019      Test performed by modified procedure. Serum heat inactivated and tested   by a modified ([place]) protocol including fetal calf serum addition.   High-risk, mfi >3,000. Mod-risk, mfi 500-3,000.       Lab Results   Component Value Date    CSPEC EDTA PLASMA 05/22/2019       Diagnostic Studies:    RH 5/22/19  RA: A-wave: 8 mmHg, V-wave: 7 mmHg; Mean: 5 mmHg   RV: Systolic: 37 mmHg, End Diastolic: 7 mmHg  PA: Systolic:37 mmHg, Diasotlic: 20 mmHg. Mean: 27 mmHg  PCW: A-wave: 30 mmHg, V-wave: 26 mmHg, Mean: 18 mmHg  Cardiac Output: CO Jaden: 5.45 L/min, CI Jaden: 3.31 L/min/m2; CO TD: 5.53 L/min, CI TD: 3.36 L/min/m2  BP: 161 mmHg / 105 mmHg  SpO2: 99 %  PA Stat: 66.5 %    Echo 5/22/19  Mildly (EF 40-45%) reduced left ventricular function is present. Mild diffuse  hypokinesis is present.  Global right ventricular function is mildly reduced.  No significant valvular abnormalities noted.  This study was compared with the study from 3/26/2019: LV function has  decreased.      RHC 4/24/19  RA  Mean: 15 mmHg     PA  Systolic:47 mmHg  Diasotlic: 25 mmHg  Mean: 35 mmHg    PCW  Mean: 23 mmHg    Cardiac Output  CO Jaden: 4.73 L/min  CI Jaden: 2.91 L/min/m2  CO TD: 4.37 L/min  CI TD: 2.68 L/min/m2    TTE 3/26/19  Moderate concentric wall thickening consistent with left ventricular  hypertrophy is present.  Global and regional  left ventricular function is normal with an EF of 60-65%.  Right ventricle is moderately dilated with hypertrophy and systolic  dysfunction that is slightly worse than on 3/5/19.  IVC is dilated and RA pressure estimated 15 mmHg    Assessment/Plan:  Mr. Lraios is a 54 year old male who is s/p orthotopic heart transplant on 2/24/19 c/b RV dysfunction requiring IABP and FRANKLIN requiring short term HD who presents to clinic for routine follow-up. Recent post-transplant issues include pseudomonas bacteremia 2/2 pulmonary source with possible fungal component now on IV cefepime and micafungin. From a pulmonary standpoint, has improved as confirmed by recent chest CT results.. He had FRANKLIN Wednesday.  Labs still pending today but has been self-adjusting his diuretics.    # Status post OHT on 2/24/19, history of NICM  # Chronic immunosuppression  * IS had been recently lowered due to bacteremia and questionable fungal infection.  Given improvement in chest CT and normalization of WBC and reduced LVEF will increase cellcept to 1500mg bid.  Will also continue prednisone 5mg daily for now.  Spoke with Dr. Shukla (transplnat ID) who is ok with increase in immunosuppression.  Plan to repeat echo on Tuesday.  If LVEF not improved will consider earlier biopsy.  * Rejection history: prior questionable AMR findings that resolved without treatment.  Most recent biopsy ner with no DSAs  * Intolerance to medications: none  *No biopsies or central line access on left due to SVC graft     Immunosuppression:   --did not receive Simulect  --continue prednisone 5 mg daily   --increase Cellcept to 1500 mg BID as above  --continue tacrolimus goal 8-10, recheck early next week     Prophylaxis:   --CAV: aspirin 81 mg, rosuvastatin 10 mg daily  --Thrush: Nystatin swish and swallow  --PCP: hold Bactrim DS for now  --GI: omperazole 40 mg   --CMV: D+/R+, ok to stop valcyte given now 3 months post-transplant  --Osteoporosis: calcium/vitamin D       Serostatus: CMV: D+/R+. EBV: D+/R+     #Recent pseudomonas bacteremia 2/2 pseudomonas pneumonia  #Possible fungal pneumonia  Currently on IV cefepime and micafungin. Symptoms resolved. Repeat chest CT improved. transplant ID aapt next weel     #FRANKLIN on short term iHD post transplant  #FRANKLIN on CKD Wednesday, likely prerenal  Pt has been self-adjusting his Bumex.  JVD low today.  Will recheck labs today.  Tentative plan to reduce bumex to 1mg bid.  Once labs results from today obtained will formalize plan.  Discussed with patient that he should not adjust medications without speaking with transplant coordinator.  Pt voiced understanding.      Corinna Donis MD  Section Head - Advanced Heart Failure, Transplantation and Mechanical Circulatory Support  Director - Adult Congenital and Cardiovascular Genetics Center  Associate Professor of Medicine, [facility]    KALYN SUTTON      ADDENDUM 5/24/19 7:30pm:  Labs resulted and revealed significant worsening in Cr.  Given this along with recent worsening in LVEF will admit patient for further evaluantion.    Corinna Donis MD  Section Head - Advanced Heart Failure, Transplantation and Mechanical Circulatory Support  Director - Adult Congenital and Cardiovascular Genetics Center  Associate Professor of Medicine, [facility]

## 2019-05-24 NOTE — PROGRESS NOTES
Nursing Note  Everton Larios presents today to [facility] for:   Chief Complaint   Patient presents with     Other     TPA      During today's [facility] appointment, orders from Dr. Shukla were completed.  Frequency: once    Progress note:  Patient identification verified by name and date of birth.  Patient seen by provider.    Discharge Plan:   Follow up plan of care with: primary medical doctor.  Discharge instructions were reviewed with patient.  Patient/representative verbalized understanding of discharge instructions and all questions answered.  Patient discharged from [facility] in stable condition.    Jory Davis RN    Administrations This Visit     alteplase 2 mg/2 mL (in 10 mL syringe)     Admin Date  05/24/2019 Action  Given Dose  2 mg Route  Intravenous Administered By  Jory Davis RN                There were no vitals taken for this visit.

## 2019-05-24 NOTE — PROGRESS NOTES
[facility]ULT [facility]    HPI:   Mr. Larios is a 54 year old male who presents to clinic today for routine heart transplant follow-up. Patient has history of ASD (s/p repair in childhood), AFib/AF (s/p ablation), NICM, diastolic dysfunction, alcohol abuse, Pierce's esophagus, ulcerative colitis, GIB (s/p splenic embolization), and hypothyroidism, now s/p orthotopic heart transplant and bypass of persistent left SVC to right atrial appendage 2/24/19. Postop course c/b shock due to RV dysfunction requiring IABP, small pneumoperitoneum, chest wall hematoma (former ICD site, s/p evacuation and drain placement 3/31/19), HCAP/klebsiella pneumonia, and FRANKLIN (required short-term HD, now recovered). He was transferred to rehab 4/3, and ultimately discharged 4/15. Biopsy 3/25/19 showed mild AMR1 with some staining for c4d. Treatment deferred and repeat biopsy 3/28 showed improved AMR and less reactive capillary staining, and subsequent biopsies have now shown resolution of AMR and improved capillary staining.  Baseline coronary angiogram has been deferred due to renal function. Last echo 3/26 showed moderate concentric wall thickening c/w LVH, with LVEF 60-65% and moderately dilated RV with mild-moderate RV dysfunction. He had a fall in early April and found to have compression fracture. Most recently, patient admitted 4/28-5/9 for fevers, URI symptoms, abdominal pain and diarrhea. Found to be neutropenia with pseudomonas bacteremia. CT chest revealed diffuse bilateral solid with peripheral ground glass pulmonary nodules/opacities and mediastinal adenopathy, bronch with BAL grew pseudomonas. Transplant ID was consulted. fungitell positive so concern for fungal component as well. He was discharged on IV cefepime and micafungin with plans for repeat chest CT (compelted this week) and f/u with transplant ID appt (scheduled for next week).    RHC, echo and biopsy were obtained on Wednesday.  RHC revealed good cardiac  output with acceptable filling pressures but echo noted decrease in LVEF.  Given recent infection with ongoing treatment and preserved cardiac output, decision made not to treat change in LVEF while awaiting biopsy and DSA results.  Yesterday, biopsy results revealed no evidence of significant rejection and confirmed no DSAs.  Plan made to repeat echo early next week.     Patient reports that over the past several days overall he has feeling well.  He has had no further fever, chills, nausea, vomiting, or diarrhea. Denies any exertional chest pain, shortness of breath, fatigue, lightheadedness, orthopnea, PND, or edema.  He has been self-adjusting his diuretics over the past several days.        PAST MEDICAL HISTORY:  Past Medical History:   Diagnosis Date     Alcohol abuse      Pierce's esophagus 10/4/2018     Chronic rhinitis 10/4/2018     Chronic systolic heart failure (H) 10/4/2018     Depression 10/4/2018     Diastolic dysfunction      DJD (degenerative joint disease) - neck 10/4/2018     H/O congenital atrial septal defect (ASD) repair at age 3 10/4/2018     Hypothyroidism due to medication 10/4/2018     ICD, [employer] 2008; gen change 2/2018 10/4/2018     Intermittent asthma without complication 10/4/2018     Nonischemic cardiomyopathy (H) 10/4/2018     On amiodarone therapy 10/4/2018     Paroxysmal atrial fibrillation (H) 10/4/2018     S/P ablation of atrial fibrillation 10/4/2018     Systolic heart failure (H)      Ulcerative colitis (H) 10/4/2018     Ventricular tachycardia (H) 10/4/2018       FAMILY HISTORY:  Family History   Problem Relation Age of Onset     Endometrial Cancer Mother      Hypertension Father      Endometrial Cancer Maternal Grandmother        SOCIAL HISTORY:  Socioeconomic History     Marital status: Single   Tobacco Use     Smoking status: Former Smoker     Years: 10.00     Smokeless tobacco: Never Used   Substance and Sexual Activity     Alcohol use: Yes     Alcohol/week: 0.6  oz     Types: 1 Cans of beer per week     Comment: a couple times a week      Drug use: No   Social History Narrative    Everton is a retired . He lives by himself in a townhouse in [place]. He has no lawn care responsibilities. He will be staying with his folks during his post-hospital early transplant care time. No history of TB exposures.        CURRENT MEDICATIONS:    Current Outpatient Medications on File Prior to Visit:  acetaminophen (TYLENOL) 325 MG tablet Take 2 tablets (650 mg) by mouth every 4 hours as needed for mild pain   albuterol (PROAIR HFA/PROVENTIL HFA/VENTOLIN HFA) 108 (90 Base) MCG/ACT inhaler Inhale 2 puffs into the lungs every 6 hours as needed for shortness of breath / dyspnea or wheezing   aspirin (ASA) 81 MG chewable tablet Take 1 tablet (81 mg) by mouth daily   balsalazide (COLAZAL) 750 MG capsule TAKE 2 CAPSULES BY MOUTH TWICE DAILY   calcium carbonate (TUMS) 500 MG chewable tablet Take 1 tablet (500 mg) by mouth daily as needed for heartburn   calcium carbonate 600 mg-vitamin D 400 units (CALTRATE) 600-400 MG-UNIT per tablet Take 1 tablet by mouth 2 times daily (with meals)   ceFEPIme (MAXIPIME) 2 G vial Inject 2 g into the vein every 12 hours for 28 days   DULoxetine (CYMBALTA) 20 MG EC capsule Take 20 mg by mouth daily   fluticasone (FLONASE) 50 MCG/ACT nasal spray Spray 1 spray into both nostrils 2 times daily   fluticasone (FLOVENT HFA) 220 MCG/ACT Inhaler Inhale 2 puffs into the lungs 2 times daily   levothyroxine (SYNTHROID/LEVOTHROID) 100 MCG tablet Take 100 mcg by mouth daily    magnesium oxide (MAG-OX) 400 MG tablet Take 1 tablet (400 mg) by mouth 2 times daily   melatonin 3 MG tablet Take 2 tablets (6 mg) by mouth At Bedtime   micafungin 150 mg Inject 150 mg into the vein every 24 hours for 28 days   montelukast (SINGULAIR) 10 MG tablet Take 1 tablet (10 mg) by mouth At Bedtime   multivitamin w/minerals (THERA-VIT-M) tablet Take 1 tablet by mouth daily  "  mycophenolate (GENERIC EQUIVALENT) 250 MG capsule Take 4 capsules (1,000 mg) by mouth 2 times daily   nystatin (MYCOSTATIN) 461999 UNIT/ML suspension Take 5 mLs (500,000 Units) by mouth 4 times daily   omeprazole (PRILOSEC) 40 MG DR capsule Take 1 capsule (40 mg) by mouth daily   potassium chloride ER (K-DUR/KLOR-CON M) 20 MEQ CR tablet Take 1 tablet (20 mEq) by mouth 2 times daily   predniSONE (DELTASONE) 5 MG tablet Take 5 mg by mouth 2 times daily. (Patient taking differently: Take 5 mg by mouth daily .)   rosuvastatin (CRESTOR) 10 MG tablet Take 1 tablet (10 mg) by mouth daily   senna (SENOKOT) 8.6 MG tablet Take 2 tablets by mouth 2 times daily as needed for constipation   tacrolimus (GENERIC EQUIVALENT) 1 MG capsule Take 2 mg by mouth  in the morning and  3 mg  in the evening   valGANciclovir (VALCYTE) 450 MG tablet Take 1 tablet (450 mg) by mouth daily   bumetanide (BUMEX) 2 MG tablet Take 1 tablet (2 mg) by mouth 2 times daily   sulfamethoxazole-trimethoprim (BACTRIM DS/SEPTRA DS) 800-160 MG tablet Three times weekly: Monday, Wednesday, Friday (Patient not taking: Reported on 5/24/2019)     No current facility-administered medications on file prior to visit.     ROS:  See HPI    EXAM:  /83 (BP Location: Left arm, Patient Position: Chair, Cuff Size: Adult Regular)   Pulse 102   Ht 1.727 m (5' 8\")   Wt 59.6 kg (131 lb 6.4 oz)   SpO2 100%   BMI 19.98 kg/m    GENERAL: Appears comfortable, in no acute distress, thin.   HEENT: Eye symmetrical, no discharge or icterus bilaterally. No thrush.   CV: Tachycardic and regular, +S1S2, no murmur, rub, or gallop. JVP not visible at 45 degrees.   RESPIRATORY: Respirations regular, even, and unlabored. Lungs CTA throughout, good air movement.  GI: Soft, thin and non distended with normoactive bowel sounds present in all quadrants. No tenderness, rebound, guarding. No hepatomegaly.   EXTREMITIES: Np peripheral edema. 2+ bilateral pedal pulses.   NEUROLOGIC: Alert " and oriented x 3. No focal deficits.   MUSCULOSKELETAL: No joint swelling or tenderness.   SKIN: No jaundice. Thin skin noted with small skin tears on hands. Acne consistent with steroid use.       Labs - to be drawn after clinic given issue with PICC line  Labs from Wednesday  CBC RESULTS:  Lab Results   Component Value Date    WBC 9.0 05/22/2019    RBC 3.33 (L) 05/22/2019    HGB 9.7 (L) 05/22/2019    HCT 32.0 (L) 05/22/2019    MCV 96 05/22/2019    MCH 29.1 05/22/2019    MCHC 30.3 (L) 05/22/2019    RDW 17.4 (H) 05/22/2019     05/22/2019       CMP RESULTS:  Lab Results   Component Value Date     05/22/2019    POTASSIUM 3.7 05/22/2019    CHLORIDE 102 05/22/2019    CO2 25 05/22/2019    ANIONGAP 10 05/22/2019    GLC 82 05/22/2019    BUN 53 (H) 05/22/2019    CR 2.43 (H) 05/22/2019    GFRESTIMATED 29 (L) 05/22/2019    GFRESTBLACK 33 (L) 05/22/2019    RADAMES 9.3 05/22/2019    BILITOTAL 0.4 05/22/2019    ALBUMIN 3.5 05/22/2019    ALKPHOS 109 05/22/2019    ALT 20 05/22/2019    AST 15 05/22/2019        INR RESULTS:  Lab Results   Component Value Date    INR 1.22 (H) 05/08/2019       LIPID RESULTS:  Lab Results   Component Value Date    CHOL 204 (H) 05/22/2019    HDL 85 05/22/2019    LDL 84 05/22/2019    TRIG 175 (H) 05/22/2019       IMMUNOSUPPRESSANT LEVELS:  Lab Results   Component Value Date    TACROL 7.9 05/22/2019    DOSTAC Not Provided 05/22/2019       No components found for: CK  Lab Results   Component Value Date    MAG 1.9 05/22/2019     Lab Results   Component Value Date    A1C 5.7 (H) 02/23/2019     Lab Results   Component Value Date    PHOS 4.2 05/22/2019     Lab Results   Component Value Date    NTBNP 10,986 (H) 11/15/2018     Lab Results   Component Value Date    SA[place]ET SA FCS 05/22/2019    SAICELL Class I 05/22/2019    FF2FMQAUL None 05/22/2019    [id number] None 05/22/2019    SAIREPCOM  05/22/2019      Test performed by modified procedure. Serum heat inactivated and tested   by a modified ([place])  protocol including fetal calf serum addition.   High-risk, mfi >3,000. Mod-risk, mfi 500-3,000.       Lab Results   Component Value Date    SAIITESTME SA FCS 05/22/2019    SAIICELL Class II 05/22/2019    [id number] None 05/22/2019    [id number] None 05/22/2019    SAIIREPCOM  05/22/2019      Test performed by modified procedure. Serum heat inactivated and tested   by a modified ([place]) protocol including fetal calf serum addition.   High-risk, mfi >3,000. Mod-risk, mfi 500-3,000.       Lab Results   Component Value Date    CSPEC EDTA PLASMA 05/22/2019       Diagnostic Studies:    RHC 5/22/19  RA: A-wave: 8 mmHg, V-wave: 7 mmHg; Mean: 5 mmHg   RV: Systolic: 37 mmHg, End Diastolic: 7 mmHg  PA: Systolic:37 mmHg, Diasotlic: 20 mmHg. Mean: 27 mmHg  PCW: A-wave: 30 mmHg, V-wave: 26 mmHg, Mean: 18 mmHg  Cardiac Output: CO Jaden: 5.45 L/min, CI Jaden: 3.31 L/min/m2; CO TD: 5.53 L/min, CI TD: 3.36 L/min/m2  BP: 161 mmHg / 105 mmHg  SpO2: 99 %  PA Stat: 66.5 %    Echo 5/22/19  Mildly (EF 40-45%) reduced left ventricular function is present. Mild diffuse  hypokinesis is present.  Global right ventricular function is mildly reduced.  No significant valvular abnormalities noted.  This study was compared with the study from 3/26/2019: LV function has  decreased.      RHC 4/24/19  RA  Mean: 15 mmHg     PA  Systolic:47 mmHg  Diasotlic: 25 mmHg  Mean: 35 mmHg    PCW  Mean: 23 mmHg    Cardiac Output  CO Jaden: 4.73 L/min  CI Jaden: 2.91 L/min/m2  CO TD: 4.37 L/min  CI TD: 2.68 L/min/m2    TTE 3/26/19  Moderate concentric wall thickening consistent with left ventricular  hypertrophy is present.  Global and regional left ventricular function is normal with an EF of 60-65%.  Right ventricle is moderately dilated with hypertrophy and systolic  dysfunction that is slightly worse than on 3/5/19.  IVC is dilated and RA pressure estimated 15 mmHg    Assessment/Plan:  Mr. Larios is a 54 year old male who is s/p orthotopic heart transplant on  2/24/19 c/b RV dysfunction requiring IABP and FRANKLIN requiring short term HD who presents to clinic for routine follow-up. Recent post-transplant issues include pseudomonas bacteremia 2/2 pulmonary source with possible fungal component now on IV cefepime and micafungin. From a pulmonary standpoint, has improved as confirmed by recent chest CT results.. He had FRANKLIN Wednesday.  Labs still pending today but has been self-adjusting his diuretics.    # Status post OHT on 2/24/19, history of NICM  # Chronic immunosuppression  * IS had been recently lowered due to bacteremia and questionable fungal infection.  Given improvement in chest CT and normalization of WBC and reduced LVEF will increase cellcept to 1500mg bid.  Will also continue prednisone 5mg daily for now.  Spoke with Dr. Shukla (transplnat ID) who is ok with increase in immunosuppression.  Plan to repeat echo on Tuesday.  If LVEF not improved will consider earlier biopsy.  * Rejection history: prior questionable AMR findings that resolved without treatment.  Most recent biopsy ner with no DSAs  * Intolerance to medications: none  *No biopsies or central line access on left due to SVC graft     Immunosuppression:   --did not receive Simulect  --continue prednisone 5 mg daily   --increase Cellcept to 1500 mg BID as above  --continue tacrolimus goal 8-10, recheck early next week     Prophylaxis:   --CAV: aspirin 81 mg, rosuvastatin 10 mg daily  --Thrush: Nystatin swish and swallow  --PCP: hold Bactrim DS for now  --GI: omperazole 40 mg   --CMV: D+/R+, ok to stop valcyte given now 3 months post-transplant  --Osteoporosis: calcium/vitamin D      Serostatus: CMV: D+/R+. EBV: D+/R+     #Recent pseudomonas bacteremia 2/2 pseudomonas pneumonia  #Possible fungal pneumonia  Currently on IV cefepime and micafungin. Symptoms resolved. Repeat chest CT improved. transplant ID aapt next weel     #FRANKLIN on short term iHD post transplant  #FRANKLIN on CKD Wednesday, likely prerenal  Pt has  been self-adjusting his Bumex.  JVD low today.  Will recheck labs today.  Tentative plan to reduce bumex to 1mg bid.  Once labs results from today obtained will formalize plan.  Discussed with patient that he should not adjust medications without speaking with transplant coordinator.  Pt voiced understanding.      Corinna Donis MD  Section Head - Advanced Heart Failure, Transplantation and Mechanical Circulatory Support  Director - Adult Congenital and Cardiovascular Genetics Center  Associate Professor of Medicine, [facility]          KALYN SUTTON        ADDENDUM 5/24/19 7:30pm:  Labs resulted and revealed significant worsening in Cr.  Given this along with recent worsening in LVEF will admit patient for further evaluantion.    Corinna Donis MD  Section Head - Advanced Heart Failure, Transplantation and Mechanical Circulatory Support  Director - Adult Congenital and Cardiovascular Genetics Center  Associate Professor of Medicine, [facility]

## 2019-05-24 NOTE — NURSING NOTE
Chief Complaint   Patient presents with     Blood Draw     labs drawn via PICC by rn     Labs drawn via PICC by rn in lab. PICC flushed with saline and hooked up to pump.   Deborah Sousa RN      18

## 2019-05-25 ENCOUNTER — APPOINTMENT (OUTPATIENT)
Dept: ULTRASOUND IMAGING | Facility: CLINIC | Age: 54
DRG: 683 | End: 2019-05-25
Attending: INTERNAL MEDICINE
Payer: COMMERCIAL

## 2019-05-25 ENCOUNTER — APPOINTMENT (OUTPATIENT)
Dept: CARDIOLOGY | Facility: CLINIC | Age: 54
DRG: 683 | End: 2019-05-25
Attending: INTERNAL MEDICINE
Payer: COMMERCIAL

## 2019-05-25 LAB
ALBUMIN SERPL-MCNC: 3.1 G/DL (ref 3.4–5)
ALP SERPL-CCNC: 91 U/L (ref 40–150)
ALT SERPL W P-5'-P-CCNC: 15 U/L (ref 0–70)
ANION GAP SERPL CALCULATED.3IONS-SCNC: 10 MMOL/L (ref 3–14)
ANION GAP SERPL CALCULATED.3IONS-SCNC: 11 MMOL/L (ref 3–14)
ANION GAP SERPL CALCULATED.3IONS-SCNC: 12 MMOL/L (ref 3–14)
AST SERPL W P-5'-P-CCNC: 16 U/L (ref 0–45)
BILIRUB DIRECT SERPL-MCNC: 0.1 MG/DL (ref 0–0.2)
BILIRUB SERPL-MCNC: 0.3 MG/DL (ref 0.2–1.3)
BUN SERPL-MCNC: 60 MG/DL (ref 7–30)
BUN SERPL-MCNC: 62 MG/DL (ref 7–30)
BUN SERPL-MCNC: 65 MG/DL (ref 7–30)
CALCIUM SERPL-MCNC: 8.4 MG/DL (ref 8.5–10.1)
CALCIUM SERPL-MCNC: 9.1 MG/DL (ref 8.5–10.1)
CALCIUM SERPL-MCNC: 9.2 MG/DL (ref 8.5–10.1)
CHLORIDE SERPL-SCNC: 104 MMOL/L (ref 94–109)
CHLORIDE SERPL-SCNC: 106 MMOL/L (ref 94–109)
CHLORIDE SERPL-SCNC: 106 MMOL/L (ref 94–109)
CMV DNA SPEC NAA+PROBE-ACNC: NORMAL [IU]/ML
CMV DNA SPEC NAA+PROBE-LOG#: NORMAL {LOG_IU}/ML
CO2 SERPL-SCNC: 19 MMOL/L (ref 20–32)
CO2 SERPL-SCNC: 21 MMOL/L (ref 20–32)
CO2 SERPL-SCNC: 22 MMOL/L (ref 20–32)
CREAT SERPL-MCNC: 3.45 MG/DL (ref 0.66–1.25)
CREAT SERPL-MCNC: 3.6 MG/DL (ref 0.66–1.25)
CREAT SERPL-MCNC: 3.63 MG/DL (ref 0.66–1.25)
ERYTHROCYTE [DISTWIDTH] IN BLOOD BY AUTOMATED COUNT: 17.1 % (ref 10–15)
GFR SERPL CREATININE-BSD FRML MDRD: 18 ML/MIN/{1.73_M2}
GFR SERPL CREATININE-BSD FRML MDRD: 18 ML/MIN/{1.73_M2}
GFR SERPL CREATININE-BSD FRML MDRD: 19 ML/MIN/{1.73_M2}
GLUCOSE SERPL-MCNC: 101 MG/DL (ref 70–99)
GLUCOSE SERPL-MCNC: 264 MG/DL (ref 70–99)
GLUCOSE SERPL-MCNC: 75 MG/DL (ref 70–99)
HCT VFR BLD AUTO: 27.3 % (ref 40–53)
HGB BLD-MCNC: 8.2 G/DL (ref 13.3–17.7)
MCH RBC QN AUTO: 29.5 PG (ref 26.5–33)
MCHC RBC AUTO-ENTMCNC: 30 G/DL (ref 31.5–36.5)
MCV RBC AUTO: 98 FL (ref 78–100)
PLATELET # BLD AUTO: 166 10E9/L (ref 150–450)
POTASSIUM SERPL-SCNC: 3.9 MMOL/L (ref 3.4–5.3)
POTASSIUM SERPL-SCNC: 4 MMOL/L (ref 3.4–5.3)
POTASSIUM SERPL-SCNC: 4 MMOL/L (ref 3.4–5.3)
PROT SERPL-MCNC: 6.5 G/DL (ref 6.8–8.8)
RBC # BLD AUTO: 2.78 10E12/L (ref 4.4–5.9)
SODIUM SERPL-SCNC: 136 MMOL/L (ref 133–144)
SODIUM SERPL-SCNC: 138 MMOL/L (ref 133–144)
SODIUM SERPL-SCNC: 139 MMOL/L (ref 133–144)
SPECIMEN SOURCE: NORMAL
TACROLIMUS BLD-MCNC: 7.8 UG/L (ref 5–15)
TME LAST DOSE: NORMAL H
WBC # BLD AUTO: 8.1 10E9/L (ref 4–11)

## 2019-05-25 PROCEDURE — 36592 COLLECT BLOOD FROM PICC: CPT | Performed by: INTERNAL MEDICINE

## 2019-05-25 PROCEDURE — 25000128 H RX IP 250 OP 636: Performed by: INTERNAL MEDICINE

## 2019-05-25 PROCEDURE — 80048 BASIC METABOLIC PNL TOTAL CA: CPT | Performed by: INTERNAL MEDICINE

## 2019-05-25 PROCEDURE — 36592 COLLECT BLOOD FROM PICC: CPT | Performed by: NURSE PRACTITIONER

## 2019-05-25 PROCEDURE — 25000131 ZZH RX MED GY IP 250 OP 636 PS 637: Performed by: INTERNAL MEDICINE

## 2019-05-25 PROCEDURE — 80048 BASIC METABOLIC PNL TOTAL CA: CPT | Performed by: NURSE PRACTITIONER

## 2019-05-25 PROCEDURE — 76770 US EXAM ABDO BACK WALL COMP: CPT

## 2019-05-25 PROCEDURE — 93321 DOPPLER ECHO F-UP/LMTD STD: CPT | Mod: 26 | Performed by: INTERNAL MEDICINE

## 2019-05-25 PROCEDURE — 80076 HEPATIC FUNCTION PANEL: CPT | Performed by: INTERNAL MEDICINE

## 2019-05-25 PROCEDURE — 80197 ASSAY OF TACROLIMUS: CPT | Performed by: INTERNAL MEDICINE

## 2019-05-25 PROCEDURE — 25000128 H RX IP 250 OP 636: Performed by: NURSE PRACTITIONER

## 2019-05-25 PROCEDURE — 93308 TTE F-UP OR LMTD: CPT

## 2019-05-25 PROCEDURE — 25000132 ZZH RX MED GY IP 250 OP 250 PS 637: Performed by: INTERNAL MEDICINE

## 2019-05-25 PROCEDURE — 25800030 ZZH RX IP 258 OP 636: Performed by: INTERNAL MEDICINE

## 2019-05-25 PROCEDURE — 93308 TTE F-UP OR LMTD: CPT | Mod: 26 | Performed by: INTERNAL MEDICINE

## 2019-05-25 PROCEDURE — 85027 COMPLETE CBC AUTOMATED: CPT | Performed by: INTERNAL MEDICINE

## 2019-05-25 PROCEDURE — 99222 1ST HOSP IP/OBS MODERATE 55: CPT | Mod: 25 | Performed by: INTERNAL MEDICINE

## 2019-05-25 PROCEDURE — 93325 DOPPLER ECHO COLOR FLOW MAPG: CPT | Mod: 26 | Performed by: INTERNAL MEDICINE

## 2019-05-25 PROCEDURE — 12000001 ZZH R&B MED SURG/OB UMMC

## 2019-05-25 RX ORDER — CALCIUM CARBONATE 500 MG/1
500 TABLET, CHEWABLE ORAL DAILY PRN
Status: DISCONTINUED | OUTPATIENT
Start: 2019-05-25 | End: 2019-05-26 | Stop reason: HOSPADM

## 2019-05-25 RX ORDER — TACROLIMUS 1 MG/1
2 CAPSULE ORAL
Status: DISCONTINUED | OUTPATIENT
Start: 2019-05-25 | End: 2019-05-26 | Stop reason: HOSPADM

## 2019-05-25 RX ADMIN — ONDANSETRON 4 MG: 4 TABLET, ORALLY DISINTEGRATING ORAL at 15:24

## 2019-05-25 RX ADMIN — PREDNISONE 5 MG: 5 TABLET ORAL at 09:01

## 2019-05-25 RX ADMIN — SODIUM CHLORIDE 1000 ML: 9 INJECTION, SOLUTION INTRAVENOUS at 13:07

## 2019-05-25 RX ADMIN — CEFEPIME 2 G: 2 INJECTION, POWDER, FOR SOLUTION INTRAVENOUS at 07:05

## 2019-05-25 RX ADMIN — MONTELUKAST SODIUM 10 MG: 10 TABLET, COATED ORAL at 21:25

## 2019-05-25 RX ADMIN — FLUTICASONE PROPIONATE 1 SPRAY: 50 SPRAY, METERED NASAL at 09:04

## 2019-05-25 RX ADMIN — SENNOSIDES AND DOCUSATE SODIUM 2 TABLET: 8.6; 5 TABLET ORAL at 21:24

## 2019-05-25 RX ADMIN — MELATONIN TAB 3 MG 6 MG: 3 TAB at 21:25

## 2019-05-25 RX ADMIN — CALCIUM CARBONATE (ANTACID) CHEW TAB 500 MG 500 MG: 500 CHEW TAB at 21:30

## 2019-05-25 RX ADMIN — ACETAMINOPHEN 650 MG: 325 TABLET, FILM COATED ORAL at 17:15

## 2019-05-25 RX ADMIN — ASPIRIN 81 MG CHEWABLE TABLET 81 MG: 81 TABLET CHEWABLE at 09:01

## 2019-05-25 RX ADMIN — SODIUM CHLORIDE, POTASSIUM CHLORIDE, SODIUM LACTATE AND CALCIUM CHLORIDE 1000 ML: 600; 310; 30; 20 INJECTION, SOLUTION INTRAVENOUS at 00:06

## 2019-05-25 RX ADMIN — ROSUVASTATIN CALCIUM 10 MG: 10 TABLET, FILM COATED ORAL at 09:03

## 2019-05-25 RX ADMIN — MYCOPHENOLATE MOFETIL 1500 MG: 250 CAPSULE ORAL at 09:02

## 2019-05-25 RX ADMIN — NYSTATIN 500000 UNITS: 100000 SUSPENSION ORAL at 15:26

## 2019-05-25 RX ADMIN — MYCOPHENOLATE MOFETIL 1500 MG: 250 CAPSULE ORAL at 21:24

## 2019-05-25 RX ADMIN — NYSTATIN 500000 UNITS: 100000 SUSPENSION ORAL at 09:03

## 2019-05-25 RX ADMIN — FLUTICASONE FUROATE 1 PUFF: 200 POWDER RESPIRATORY (INHALATION) at 09:03

## 2019-05-25 RX ADMIN — ACETAMINOPHEN 650 MG: 325 TABLET, FILM COATED ORAL at 21:30

## 2019-05-25 RX ADMIN — MICAFUNGIN SODIUM 150 MG: 10 INJECTION, POWDER, LYOPHILIZED, FOR SOLUTION INTRAVENOUS at 09:02

## 2019-05-25 RX ADMIN — SENNOSIDES AND DOCUSATE SODIUM 2 TABLET: 8.6; 5 TABLET ORAL at 09:05

## 2019-05-25 RX ADMIN — CALCIUM CARBONATE (ANTACID) CHEW TAB 500 MG 500 MG: 500 CHEW TAB at 16:54

## 2019-05-25 RX ADMIN — NYSTATIN 500000 UNITS: 100000 SUSPENSION ORAL at 21:24

## 2019-05-25 RX ADMIN — ACETAMINOPHEN 650 MG: 325 TABLET, FILM COATED ORAL at 12:18

## 2019-05-25 RX ADMIN — TACROLIMUS 2 MG: 1 CAPSULE ORAL at 17:16

## 2019-05-25 RX ADMIN — TACROLIMUS 2 MG: 1 CAPSULE ORAL at 09:02

## 2019-05-25 RX ADMIN — DULOXETINE 20 MG: 20 CAPSULE, DELAYED RELEASE ORAL at 09:03

## 2019-05-25 RX ADMIN — LEVOTHYROXINE SODIUM 100 MCG: 25 TABLET ORAL at 09:01

## 2019-05-25 RX ADMIN — NYSTATIN 500000 UNITS: 100000 SUSPENSION ORAL at 12:16

## 2019-05-25 ASSESSMENT — ACTIVITIES OF DAILY LIVING (ADL)
ADLS_ACUITY_SCORE: 16
ADLS_ACUITY_SCORE: 16
ADLS_ACUITY_SCORE: 17
ADLS_ACUITY_SCORE: 14
ADLS_ACUITY_SCORE: 16
ADLS_ACUITY_SCORE: 17

## 2019-05-25 ASSESSMENT — PAIN DESCRIPTION - DESCRIPTORS
DESCRIPTORS: SORE
DESCRIPTORS: SORE

## 2019-05-25 NOTE — PLAN OF CARE
Pt with hx of heart tx 2/24/19 comes in with creat of 3.6 for IV hydration. Received 1L LR overnight and is now getting 1L NS. Had heart echo and kidney US done today. BPs sl elevated. ST 100s. Tylenol for back pain. Pt unhappy with being hospitalized again.

## 2019-05-25 NOTE — PROGRESS NOTES
On call coordinator note: Dr Donis with recommendation for abnormal Cr of 3.43 from today's lab draw, she recommends that he be admittedfor IV fluid and lab monitoring.   Attempted to contact Everton x 3 with no response- left message to call on call coordinator, contacted parents they were not sure how to get a hold of him. Will continue to try to reach him.   Admissions has been made aware.   Spoke to Everton, was resistant to coming into hospital Dr Donis contacted Everton and he has agreed to be admitted. Report called to Alison on 6c.

## 2019-05-25 NOTE — H&P
Cardiology    History and Physical         Date of Admission:  5/24/2019  Date of Service (when I saw the patient): 05/24/19    Assessment & Plan   Everton Larios is a 54 year old male with a PMH of nonischemic cardiomyopathy status post heart transplant on 2/24/2019, paroxysmal atrial fibrillation, ulcerative colitis, T12 fracture on 4/21/2019, Pierce's esophagus, hypothyroidism, major depressive disorder who presents with prerenal acute kidney injury    Prerenal acute kidney injury  The patient has had decreased p.o. intake for the past 1 week which likely led to his elevated creatinine of 3.43 up from and up from baseline of his prior 2.43 on 5/22 and on from his baseline of 1.4.  Etiology of acute kidney injury is likely due to his poor p.o. Intake.  -1 L of lactated Ringer's IV bolus now  -Recheck BMP in the morning, if no improvement with IV fluids, will further evaluate for other etiologies of FRANKLIN     Nonischemic cardiomyopathy status post Heart transplant on 2/24/2019   The patient has no evidence of acute rejection at this time despite his history of mild AMR1 on 3/25/2019.  The patient has been stable on mycophenolate 1500 mg twice daily, tacrolimus, and prednisone 5 mg daily  -Continue mycophenolate 1500 mg twice daily  -Check tacrolimus trough level at 8 AM, 12 hours after last dose  -Continue prednisone 5 mg daily  -Continue tacrolimus 2 mg twice daily, with goal of 8-10    Ulcerative colitis  The patient has no symptoms of diarrhea on admission  - Holding prior to admission balsalazide due to FRANKLIN    Hypothyroidism  - Continue prior to admission levothyroxine     MDD  - Continue prior to admission duloxetine     Diet: Cardiac  Prophylaxis: Mechanical and ambulation  Code: Full  Dispel: Patient requires inpatient hospitalization for acute kidney injury in the setting of heart transplant    Ray Durbin MD  Internal Medicine PGY 3    Chief Complaint   Acute kidney injury    History is obtained  from the patient    History of Present Illness   Everton Larios is a 54 year old male who presents with acute kidney injury    The patient noted that he has felt well for the past several days and has not had any concerns in regards to his medications or his health.  He noted that he takes his medications as prescribed and had his last tacrolimus at 8 PM.  He denies orthostatic hypotension, or diarrhea.  Further he denies fevers, chills, nausea, vomiting, diarrhea, abdominal pain, dyspnea, cough, worsening chest pain.  He noted that he said decreased appetite for the past week but has taken the same amount of liquids.  The patient went to his outpatient cardiology appointment today and after labs are drawn and an elevated creatinine was found he was requested to be admitted to the hospital for IV hydration    Past Medical History    I have reviewed this patient's medical history and updated it with pertinent information if needed.   Past Medical History:   Diagnosis Date     Alcohol abuse      Pierce's esophagus 10/4/2018     Chronic rhinitis 10/4/2018     Chronic systolic heart failure (H) 10/4/2018     Depression 10/4/2018     Diastolic dysfunction      DJD (degenerative joint disease) - neck 10/4/2018     H/O congenital atrial septal defect (ASD) repair at age 3 10/4/2018     Hypothyroidism due to medication 10/4/2018     ICD, [employer] 2008; gen change 2/2018 10/4/2018     Intermittent asthma without complication 10/4/2018     Nonischemic cardiomyopathy (H) 10/4/2018     On amiodarone therapy 10/4/2018     Paroxysmal atrial fibrillation (H) 10/4/2018     S/P ablation of atrial fibrillation 10/4/2018     Systolic heart failure (H)      Ulcerative colitis (H) 10/4/2018     Ventricular tachycardia (H) 10/4/2018       Past Surgical History   I have reviewed this patient's surgical history and updated it with pertinent information if needed.  Past Surgical History:   Procedure Laterality Date     AICD,  DUAL CHAMBER       BRONCHOSCOPY (RIGID OR FLEXIBLE), DIAGNOSTIC N/A 4/30/2019    Procedure: BRONCHOSCOPY, WITH BAL;  Surgeon: Margot Chiang MD;  Location:  GI     CV HEART BIOPSY N/A 3/4/2019    Procedure: Heart Biopsy;  Surgeon: Abhijeet Titus MD;  Location:  HEART CARDIAC CATH LAB     CV HEART BIOPSY N/A 3/25/2019    Procedure: Heart Biopsy;  Surgeon: Yves Rodrigues MD;  Location:  HEART CARDIAC CATH LAB     CV HEART BIOPSY N/A 3/28/2019    Procedure: Heart Cath Heart Biopsy;  Surgeon: Yves Rodrigues MD;  Location:  HEART CARDIAC CATH LAB     CV HEART BIOPSY N/A 4/10/2019    Procedure: CV HEART BIOPSY;  Surgeon: Isiah Mcallister MD;  Location:  HEART CARDIAC CATH LAB     CV HEART BIOPSY N/A 4/24/2019    Procedure: CV HEART BIOPSY;  Surgeon: Isiah Mcallister MD;  Location:  HEART CARDIAC CATH LAB     CV HEART BIOPSY N/A 5/8/2019    Procedure: CV HEART BIOPSY;  Surgeon: Isiah Mcallister MD;  Location:  HEART CARDIAC CATH LAB     CV INTRA-AORTIC BALLOON PUMP INSERTION N/A 2/18/2019    Procedure: Intra-Aortic Balloon;  Surgeon: Alton Solomon MD;  Location:  HEART CARDIAC CATH LAB     CV INTRA-AORTIC BALLOON PUMP INSERTION N/A 2/20/2019    Procedure: Replace subclavian IABP;  Surgeon: Yves Rodrigues MD;  Location:  HEART CARDIAC CATH LAB     CV LEFT HEART CATH N/A 3/19/2019    Procedure: Left Heart Cath;  Surgeon: Yves Rodrigues MD;  Location:  HEART CARDIAC CATH LAB     CV RIGHT HEART CATH N/A 3/19/2019    Procedure: RHC/HBx - Femoral access for 3/19 per Nimisha GONZALEZ;  Surgeon: Yves Rodrigues MD;  Location:  HEART CARDIAC CATH LAB     CV RIGHT HEART CATH N/A 3/25/2019    Procedure: Right Heart Cath;  Surgeon: Yves Rodrigues MD;  Location:  HEART CARDIAC CATH LAB     CV RIGHT HEART CATH N/A 3/28/2019    Procedure: Heart Cath Right Heart Cath;  Surgeon: Yves Rodrigues MD;  Location:  HEART CARDIAC CATH LAB     CV RIGHT HEART CATH  N/A 4/24/2019    Procedure: CV RIGHT HEART CATH;  Surgeon: Isiah Mcallister MD;  Location:  HEART CARDIAC CATH LAB     CV RIGHT HEART CATH N/A 3/11/2019    Procedure: ADD ON RHC/HBX;  Surgeon: Alton Solomon MD;  Location:  HEART CARDIAC CATH LAB     INCISION AND DRAINAGE CHEST WASHOUT, COMBINED N/A 3/21/2019    Procedure: Incision And Drainage; Evacuation Right Chest Wall Hematoma;  Surgeon: Dewayne House MD;  Location: [facility]     INSERT INTRAAORTIC BALLOON PUMP Left 2/19/2019    Procedure: Insert left  Subclavian Balloon Pump,  Removal Right femoral arterial balloom pump sheath;  Surgeon: Dewayne House MD;  Location: [facility]     INSERT INTRAAORTIC BALLOON PUMP Left 2/21/2019    Procedure: SUBCLAVIAN BALLOON PUMP PLACEMENT;  Surgeon: Ben White MD;  Location: [facility] OR     IR PICC PLACEMENT > 5 YRS OF AGE  5/8/2019     TRANSPLANT HEART RECIPIENT N/A 2/24/2019    Procedure: TRANSPLANT HEART RECIPIENT;  Surgeon: Dewayne House MD;  Location: [facility] OR       Prior to Admission Medications   Prior to Admission Medications   Prescriptions Last Dose Informant Patient Reported? Taking?   DULoxetine (CYMBALTA) 20 MG EC capsule  Self Yes No   Sig: Take 20 mg by mouth daily   acetaminophen (TYLENOL) 325 MG tablet  Self No No   Sig: Take 2 tablets (650 mg) by mouth every 4 hours as needed for mild pain   albuterol (PROAIR HFA/PROVENTIL HFA/VENTOLIN HFA) 108 (90 Base) MCG/ACT inhaler  Self Yes No   Sig: Inhale 2 puffs into the lungs every 6 hours as needed for shortness of breath / dyspnea or wheezing   aspirin (ASA) 81 MG chewable tablet  Self No No   Sig: Take 1 tablet (81 mg) by mouth daily   balsalazide (COLAZAL) 750 MG capsule  Self Yes No   Sig: TAKE 2 CAPSULES BY MOUTH TWICE DAILY   bumetanide (BUMEX) 1 MG tablet   No No   Sig: Take 1 tablet (1 mg) by mouth 2 times daily   calcium carbonate (TUMS) 500 MG chewable tablet  Self No No   Sig: Take 1 tablet (500 mg) by mouth daily as  needed for heartburn   calcium carbonate 600 mg-vitamin D 400 units (CALTRATE) 600-400 MG-UNIT per tablet  Self No No   Sig: Take 1 tablet by mouth 2 times daily (with meals)   ceFEPIme (MAXIPIME) 2 G vial  Self No No   Sig: Inject 2 g into the vein every 12 hours for 28 days   fluticasone (FLONASE) 50 MCG/ACT nasal spray  Self Yes No   Sig: Spray 1 spray into both nostrils 2 times daily   fluticasone (FLOVENT HFA) 220 MCG/ACT Inhaler  Self Yes No   Sig: Inhale 2 puffs into the lungs 2 times daily   levothyroxine (SYNTHROID/LEVOTHROID) 100 MCG tablet  Self Yes No   Sig: Take 100 mcg by mouth daily    magnesium oxide (MAG-OX) 400 MG tablet  Self No No   Sig: Take 1 tablet (400 mg) by mouth 2 times daily   melatonin 3 MG tablet  Self No No   Sig: Take 2 tablets (6 mg) by mouth At Bedtime   micafungin 150 mg  Self No No   Sig: Inject 150 mg into the vein every 24 hours for 28 days   montelukast (SINGULAIR) 10 MG tablet  Self Yes No   Sig: Take 1 tablet (10 mg) by mouth At Bedtime   multivitamin w/minerals (THERA-VIT-M) tablet  Self No No   Sig: Take 1 tablet by mouth daily   mycophenolate (GENERIC EQUIVALENT) 250 MG capsule   No No   Sig: Take 6 capsules (1,500 mg) by mouth 2 times daily   nystatin (MYCOSTATIN) 858807 UNIT/ML suspension  Self No No   Sig: Take 5 mLs (500,000 Units) by mouth 4 times daily   omeprazole (PRILOSEC) 40 MG DR capsule  Self Yes No   Sig: Take 1 capsule (40 mg) by mouth daily   potassium chloride ER (K-DUR/KLOR-CON M) 20 MEQ CR tablet   No No   Sig: Take 1 tablet (20 mEq) by mouth daily   predniSONE (DELTASONE) 5 MG tablet   No No   Sig: Take 1 tablet (5 mg) by mouth daily   rosuvastatin (CRESTOR) 10 MG tablet  Self No No   Sig: Take 1 tablet (10 mg) by mouth daily   senna (SENOKOT) 8.6 MG tablet  Self Yes No   Sig: Take 2 tablets by mouth 2 times daily as needed for constipation   sulfamethoxazole-trimethoprim (BACTRIM DS/SEPTRA DS) 800-160 MG tablet  Self No No   Sig: Three times weekly:  Monday, Wednesday, Friday   Patient not taking: Reported on 5/24/2019   tacrolimus (GENERIC EQUIVALENT) 1 MG capsule  Self Yes No   Sig: Take 2 mg by mouth  in the morning and  3 mg  in the evening      Facility-Administered Medications: None     Allergies   Allergies   Allergen Reactions     Hydromorphone Other (See Comments)     Significant Delirium     Adhesive Tape Blisters     Tegaderm causes bruises, blisters and extreme irritation.     Lisinopril Other (See Comments)     hypotension     Quinolones Other (See Comments)     Would not rx quinolones until QTc interval improved.      Tobramycin Other (See Comments)     Would not use aminoglycosides as his kidney function is very borderline     Codeine Nausea       Social History   I have reviewed this patient's social history and updated it with pertinent information if needed. Everton Larios  reports that he has quit smoking. He quit after 10.00 years of use. He has never used smokeless tobacco. He reports that he drinks about 0.6 oz of alcohol per week. He reports that he does not use drugs.    Family History   I have reviewed this patient's family history and updated it with pertinent information if needed.   Family History   Problem Relation Age of Onset     Endometrial Cancer Mother      Hypertension Father      Endometrial Cancer Maternal Grandmother        Review of Systems   The 10 point Review of Systems is negative other than noted in the HPI     Physical Exam   Temp: 98.2  F (36.8  C) Temp src: Oral       Resp: 18        Vital Signs with Ranges  Temp:  [98.2  F (36.8  C)] 98.2  F (36.8  C)  Pulse:  [102] 102  Resp:  [18] 18  BP: (129)/(83) 129/83  SpO2:  [100 %] 100 %  125 lbs 0 oz    GEN:  Alert, oriented x 3, appears comfortable, NAD.  HEENT:  Normocephalic/atraumatic, no scleral icterus, no nasal discharge, mouth moist.  CV:  Tachycardic rate, regular rhythm, no murmur or JVD.  S1 + S2 noted, no S3 or S4.  LUNGS:  Clear to auscultation  bilaterally   ABD:  Active bowel sounds, soft, non-tender/non-distended.  No rebound/guarding/rigidity.  EXT:  No edema or cyanosis.  Hands/feet warm to touch with good signs of peripheral perfusion.   SKIN:  Dry to touch, no exanthems noted in the visualized areas.  NEURO:  No new focal deficits appreciated.    Data   Data reviewed today:  I personally reviewed no images or EKG's today.  Recent Labs   Lab 05/24/19  1655 05/22/19  0926   WBC 8.9 9.0   HGB 9.0* 9.7*   MCV 96 96    269    138   POTASSIUM 4.1 3.7   CHLORIDE 102 102   CO2 21 25   BUN 63* 53*   CR 3.43* 2.43*   ANIONGAP 11 10   RADAMES 9.2 9.3   * 82   ALBUMIN 3.2* 3.5   PROTTOTAL 7.2 7.3   BILITOTAL 0.4 0.4   ALKPHOS 113 109   ALT 18 20   AST 13 15       No results found for this or any previous visit (from the past 24 hour(s)).

## 2019-05-25 NOTE — PLAN OF CARE
Pt admit 5/24. See flowsheets for VS. ST. RA. Pain (shoulders, neck, HA) managed with PRN Tylenol x1. Pt given PRN Zofran + PRN Tums for abd discomfort/nausea. +BM. Continue with plan of care and report changes to treatment team.

## 2019-05-26 ENCOUNTER — RECORDS - HEALTHEAST (OUTPATIENT)
Dept: ADMINISTRATIVE | Facility: OTHER | Age: 54
End: 2019-05-26

## 2019-05-26 VITALS
DIASTOLIC BLOOD PRESSURE: 100 MMHG | WEIGHT: 126.1 LBS | BODY MASS INDEX: 19.11 KG/M2 | HEIGHT: 68 IN | TEMPERATURE: 98.2 F | RESPIRATION RATE: 18 BRPM | OXYGEN SATURATION: 100 % | SYSTOLIC BLOOD PRESSURE: 132 MMHG | HEART RATE: 94 BPM

## 2019-05-26 LAB
ALBUMIN UR-MCNC: 100 MG/DL
ANION GAP SERPL CALCULATED.3IONS-SCNC: 11 MMOL/L (ref 3–14)
ANION GAP SERPL CALCULATED.3IONS-SCNC: 9 MMOL/L (ref 3–14)
APPEARANCE UR: CLEAR
BILIRUB UR QL STRIP: NEGATIVE
BUN SERPL-MCNC: 55 MG/DL (ref 7–30)
BUN SERPL-MCNC: 56 MG/DL (ref 7–30)
CALCIUM SERPL-MCNC: 8.9 MG/DL (ref 8.5–10.1)
CALCIUM SERPL-MCNC: 9.3 MG/DL (ref 8.5–10.1)
CHLORIDE SERPL-SCNC: 106 MMOL/L (ref 94–109)
CHLORIDE SERPL-SCNC: 108 MMOL/L (ref 94–109)
CO2 SERPL-SCNC: 21 MMOL/L (ref 20–32)
CO2 SERPL-SCNC: 21 MMOL/L (ref 20–32)
COLOR UR AUTO: YELLOW
CREAT SERPL-MCNC: 3.32 MG/DL (ref 0.66–1.25)
CREAT SERPL-MCNC: 3.44 MG/DL (ref 0.66–1.25)
ERYTHROCYTE [DISTWIDTH] IN BLOOD BY AUTOMATED COUNT: 17.2 % (ref 10–15)
GFR SERPL CREATININE-BSD FRML MDRD: 19 ML/MIN/{1.73_M2}
GFR SERPL CREATININE-BSD FRML MDRD: 20 ML/MIN/{1.73_M2}
GLUCOSE SERPL-MCNC: 130 MG/DL (ref 70–99)
GLUCOSE SERPL-MCNC: 72 MG/DL (ref 70–99)
GLUCOSE UR STRIP-MCNC: NEGATIVE MG/DL
HCT VFR BLD AUTO: 28 % (ref 40–53)
HGB BLD-MCNC: 8.6 G/DL (ref 13.3–17.7)
HGB UR QL STRIP: ABNORMAL
KETONES UR STRIP-MCNC: NEGATIVE MG/DL
LEUKOCYTE ESTERASE UR QL STRIP: NEGATIVE
MCH RBC QN AUTO: 29.8 PG (ref 26.5–33)
MCHC RBC AUTO-ENTMCNC: 30.7 G/DL (ref 31.5–36.5)
MCV RBC AUTO: 97 FL (ref 78–100)
NITRATE UR QL: NEGATIVE
PH UR STRIP: 6.5 PH (ref 5–7)
PLATELET # BLD AUTO: 174 10E9/L (ref 150–450)
POTASSIUM SERPL-SCNC: 3.6 MMOL/L (ref 3.4–5.3)
POTASSIUM SERPL-SCNC: 3.7 MMOL/L (ref 3.4–5.3)
RBC # BLD AUTO: 2.89 10E12/L (ref 4.4–5.9)
RBC #/AREA URNS AUTO: 1 /HPF (ref 0–2)
SODIUM SERPL-SCNC: 138 MMOL/L (ref 133–144)
SODIUM SERPL-SCNC: 138 MMOL/L (ref 133–144)
SOURCE: ABNORMAL
SP GR UR STRIP: 1.01 (ref 1–1.03)
UROBILINOGEN UR STRIP-MCNC: NORMAL MG/DL (ref 0–2)
WBC # BLD AUTO: 8.5 10E9/L (ref 4–11)
WBC #/AREA URNS AUTO: 0 /HPF (ref 0–5)

## 2019-05-26 PROCEDURE — 85027 COMPLETE CBC AUTOMATED: CPT | Performed by: INTERNAL MEDICINE

## 2019-05-26 PROCEDURE — 25000131 ZZH RX MED GY IP 250 OP 636 PS 637: Performed by: INTERNAL MEDICINE

## 2019-05-26 PROCEDURE — 25000132 ZZH RX MED GY IP 250 OP 250 PS 637: Performed by: NURSE PRACTITIONER

## 2019-05-26 PROCEDURE — 80048 BASIC METABOLIC PNL TOTAL CA: CPT | Performed by: NURSE PRACTITIONER

## 2019-05-26 PROCEDURE — 84999 UNLISTED CHEMISTRY PROCEDURE: CPT | Performed by: NURSE PRACTITIONER

## 2019-05-26 PROCEDURE — 25000132 ZZH RX MED GY IP 250 OP 250 PS 637: Performed by: INTERNAL MEDICINE

## 2019-05-26 PROCEDURE — 25800030 ZZH RX IP 258 OP 636: Performed by: INTERNAL MEDICINE

## 2019-05-26 PROCEDURE — 80048 BASIC METABOLIC PNL TOTAL CA: CPT | Performed by: INTERNAL MEDICINE

## 2019-05-26 PROCEDURE — 80321 ALCOHOLS BIOMARKERS 1OR 2: CPT | Performed by: NURSE PRACTITIONER

## 2019-05-26 PROCEDURE — 25000128 H RX IP 250 OP 636: Performed by: INTERNAL MEDICINE

## 2019-05-26 PROCEDURE — 81001 URINALYSIS AUTO W/SCOPE: CPT | Performed by: INTERNAL MEDICINE

## 2019-05-26 PROCEDURE — 40000802 ZZH SITE CHECK

## 2019-05-26 PROCEDURE — 36415 COLL VENOUS BLD VENIPUNCTURE: CPT | Performed by: NURSE PRACTITIONER

## 2019-05-26 PROCEDURE — 25000128 H RX IP 250 OP 636: Performed by: NURSE PRACTITIONER

## 2019-05-26 RX ORDER — AMLODIPINE BESYLATE 2.5 MG/1
5 TABLET ORAL DAILY
Status: DISCONTINUED | OUTPATIENT
Start: 2019-05-26 | End: 2019-05-26 | Stop reason: HOSPADM

## 2019-05-26 RX ORDER — AMLODIPINE BESYLATE 5 MG/1
5 TABLET ORAL DAILY
Qty: 30 TABLET | Refills: 1 | Status: SHIPPED | OUTPATIENT
Start: 2019-05-27 | End: 2019-06-17

## 2019-05-26 RX ORDER — AMLODIPINE BESYLATE 5 MG/1
5 TABLET ORAL DAILY
Qty: 30 TABLET | Refills: 1 | Status: SHIPPED | OUTPATIENT
Start: 2019-05-27 | End: 2019-05-26

## 2019-05-26 RX ADMIN — FLUTICASONE FUROATE 1 PUFF: 200 POWDER RESPIRATORY (INHALATION) at 09:59

## 2019-05-26 RX ADMIN — MICAFUNGIN SODIUM 150 MG: 10 INJECTION, POWDER, LYOPHILIZED, FOR SOLUTION INTRAVENOUS at 09:13

## 2019-05-26 RX ADMIN — ROSUVASTATIN CALCIUM 10 MG: 10 TABLET, FILM COATED ORAL at 09:32

## 2019-05-26 RX ADMIN — DULOXETINE 20 MG: 20 CAPSULE, DELAYED RELEASE ORAL at 09:32

## 2019-05-26 RX ADMIN — SENNOSIDES AND DOCUSATE SODIUM 2 TABLET: 8.6; 5 TABLET ORAL at 09:21

## 2019-05-26 RX ADMIN — AMLODIPINE BESYLATE 5 MG: 2.5 TABLET ORAL at 09:59

## 2019-05-26 RX ADMIN — NYSTATIN 500000 UNITS: 100000 SUSPENSION ORAL at 09:20

## 2019-05-26 RX ADMIN — ACETAMINOPHEN 650 MG: 325 TABLET, FILM COATED ORAL at 10:04

## 2019-05-26 RX ADMIN — MYCOPHENOLATE MOFETIL 1500 MG: 250 CAPSULE ORAL at 09:20

## 2019-05-26 RX ADMIN — ALTEPLASE 2 MG: 2.2 INJECTION, POWDER, LYOPHILIZED, FOR SOLUTION INTRAVENOUS at 15:00

## 2019-05-26 RX ADMIN — PREDNISONE 5 MG: 5 TABLET ORAL at 09:20

## 2019-05-26 RX ADMIN — LEVOTHYROXINE SODIUM 100 MCG: 25 TABLET ORAL at 09:20

## 2019-05-26 RX ADMIN — NYSTATIN 500000 UNITS: 100000 SUSPENSION ORAL at 12:47

## 2019-05-26 RX ADMIN — ASPIRIN 81 MG CHEWABLE TABLET 81 MG: 81 TABLET CHEWABLE at 09:20

## 2019-05-26 RX ADMIN — TACROLIMUS 2 MG: 1 CAPSULE ORAL at 09:21

## 2019-05-26 RX ADMIN — FLUTICASONE PROPIONATE 1 SPRAY: 50 SPRAY, METERED NASAL at 10:00

## 2019-05-26 RX ADMIN — NYSTATIN 500000 UNITS: 100000 SUSPENSION ORAL at 17:18

## 2019-05-26 RX ADMIN — SODIUM CHLORIDE 1000 ML: 9 INJECTION, SOLUTION INTRAVENOUS at 10:22

## 2019-05-26 ASSESSMENT — ACTIVITIES OF DAILY LIVING (ADL)
ADLS_ACUITY_SCORE: 16

## 2019-05-26 ASSESSMENT — MIFFLIN-ST. JEOR: SCORE: 1376.49

## 2019-05-26 NOTE — DISCHARGE SUMMARY
"  [facility]   Cardiology II Service / Advanced Heart Failure  Discharge Summary     Everton Larios MRN# [id number]   YOB: 1965 Age: 54 year old     DATE OF ADMISSION: 5/24/2019  DATE OF DISCHARGE: 5/26/2019  ADMITTING PROVIDER: Codey Esteban MD  DISCHARGE PROVIDER: My [place] NP/Dr. Eulalia GONZALEZ  PRIMARY PROVIDER:   Fredrick Wolff    ADMIT DIAGNOSES:   1. FRANKLIN     DISCHARGE DIAGNOSES:   1. NICM s/p OHT 2/24/19  2. Ulcerative colitis  3. Hypothyroidism  4. Major depressive disorder  5. Constipation    HPI: Please see the detailed H & P by Dr. Ray Durbin MD and Dr. Eulalia GONZALEZ from 5/24/2019. Briefly, Everton is a 54-year-old gentleman with a past medical history of nonischemic cardiomyopathy status post heart transplant on 2/24/2019, PAF, ulcerative colitis, T12 fracture, Pierce's esophagus, hypothyroidism, major depressive disorder, who presents with acute kidney injury with a creatinine of 3.43.    Prior to admission, Everton was feeling well. He denies any recent illnesses.  No concerns with tolerating his medications, although he has been self adjusting his diuretics due to abdominal fullness which he thought was due to fluid retention.  He denies orthostatic hypotension, diarrhea, fevers, chills, nausea, vomiting, diarrhea, abdominal pain, dyspnea, cough, PND, orthopnea, or worsening chest pain.  His appetite has been poor for the past week.  He presented to clinic today with a creatinine of 3.43 up from his baseline of 1.6-1.9.    PHYSICAL EXAM:  Blood pressure (!) 135/97, pulse 94, temperature 98.7  F (37.1  C), temperature source Oral, resp. rate 18, height 1.727 m (5' 8\"), weight 57.2 kg (126 lb 1.6 oz), SpO2 99 %.  GENERAL: Appears alert and oriented times three.   HEENT: Eye symmetrical and free of discharge bilaterally. Mucous membranes moist and without lesions.  NECK: Supple and without lymphadenopathy. JVD below the level of the clavicle  CV: RRR, S1S2 present without " murmur, rub, or gallop.   RESPIRATORY: Respirations regular, even, and unlabored. Lungs CTA throughout.   GI: Soft and non distended with normoactive bowel sounds present in all quadrants. No tenderness, rebound, guarding. No hepatomegaly  EXTREMITIES: No peripheral edema. 2+ bilateral pedal pulses.   NEUROLOGIC: Alert and orientated x 3. CN II-XII grossly intact. No focal deficits.   MUSCULOSKELETAL: No joint swelling or tenderness.   SKIN: No jaundice. No rashes or lesions.     LABS:   Last CBC:   Recent Labs   Lab Test 05/26/19  0601   WBC 8.5   RBC 2.89*   HGB 8.6*   HCT 28.0*   MCV 97   MCH 29.8   MCHC 30.7*   RDW 17.2*        Last CMP:  Recent Labs   Lab Test 05/26/19  0601  05/25/19  0006      < > 136   POTASSIUM 3.6   < > 3.9   CHLORIDE 108   < > 104   RADAMES 9.3   < > 9.1   CO2 21   < > 22   BUN 56*   < > 65*   CR 3.44*   < > 3.63*   GLC 72   < > 101*   AST  --   --  16   ALT  --   --  15   BILITOTAL  --   --  0.3   ALBUMIN  --   --  3.1*   PROTTOTAL  --   --  6.5*   ALKPHOS  --   --  91    < > = values in this interval not displayed.       IMAGING:  Results for orders placed or performed during the hospital encounter of 05/24/19   US Renal Complete    Narrative    EXAMINATION: US RENAL COMPLETE, 5/25/2019 12:30 PM     COMPARISON: 3/26/2018    HISTORY: FRANKLIN-patient post heart transplant     FINDINGS:    Right kidney: Measures 12.3 cm in length. Parenchyma is of normal  thickness. Mildly increased cortical echogenicity. No focal mass. No  hydronephrosis.    Left kidney: Measures 10.4 cm in length. Parenchyma is of normal  thickness. Mildly increased cortical echogenicity. No focal mass. No  hydronephrosis.     Bladder: Unremarkable.      Impression    IMPRESSION:  Mildly increased cortical echogenicity bilaterally, suggestive of  medical renal disease. No hydronephrosis.    I have personally reviewed the examination and initial interpretation  and I agree with the findings.    RUFUS CORDERO MD        PROCEDURES:  None.    CONSULTATIONS:   None.    HOSPITAL COURSE:     Prerenal acute kidney injury:  Secondary to poor intake and self adjusting his diuretics.  Creatinine upon admission was 3.43.  His creatinine peaked at 3.63.  He received 2 Liters of normal saline in addition to a liter of Lactated Ringer' solution with improvement in his creatinine to 3.32 upon discharge.  -Holding Bumex due to hypovolemia.   -Holding Bactrim due to FRANKLIN.  -Repeat BMP on 5/28 with outpatient infusion.     Nonischemic cardiomyopathy status post Heart transplant on 2/24/2019   The patient has no evidence of acute rejection this hospitalization despite his prior history of mild AMR1 on 3/25/2019. The patient has been stable on mycophenolate 1500 mg twice daily, tacrolimus, and prednisone 5 mg daily  -Continue mycophenolate 1500 mg twice daily  -Continue tacrolimus 2 mg in the am and 3 mg in the pm.  Goal 8-10. Tacrolimus level on 5/25 was 7.8  -Continue prednisone 5 mg daily  -Repeat tacrolimus level on 5/28  -Continue Micafungin renally dosed once daily for one month until he sees Transplant ID in the end of May  -Continue Cefepime every 24 hours for one month until seen by Transplant ID the end of the month.      HTN: Elevated this admission.  BP's Mid 130's-140's/80-90's.   Amlodipine started prior to discharge  -Continue Amlodipine 5 mg daily.    Chronic Medical conditions:  Ulcerative colitis:  Continue balsalazide   Hypothyroidism:  Continue PTA levothyroxine   MDD:  Continue prior to admission duloxetine  Constipation:  Continue senna as needed for constipation.  Alcohol Abuse:  Sober for one year per his report.     PENDING RESULTS:   [facility] alcohol screening    DISCHARGE MEDICATIONS:  Discharge Medication List as of 5/26/2019  4:46 PM      CONTINUE these medications which have CHANGED    Details   amLODIPine (NORVASC) 5 MG tablet Take 1 tablet (5 mg) by mouth daily, Disp-30 tablet, R-1, E-Prescribe      ceFEPIme  (MAXIPIME) 2 G vial Inject 2 g into the vein every 24 hours, Disp-1120 mL, R-0, E-Prescribe         CONTINUE these medications which have NOT CHANGED    Details   acetaminophen (TYLENOL) 325 MG tablet Take 2 tablets (650 mg) by mouth every 4 hours as needed for mild pain, Transitional      albuterol (PROAIR HFA/PROVENTIL HFA/VENTOLIN HFA) 108 (90 Base) MCG/ACT inhaler Inhale 2 puffs into the lungs every 6 hours as needed for shortness of breath / dyspnea or wheezing, Historical      aspirin (ASA) 81 MG chewable tablet Take 1 tablet (81 mg) by mouth daily, Transitional      balsalazide (COLAZAL) 750 MG capsule TAKE 2 CAPSULES BY MOUTH TWICE DAILY, Historical      calcium carbonate (TUMS) 500 MG chewable tablet Take 1 tablet (500 mg) by mouth daily as needed for heartburn, Transitional      calcium carbonate 600 mg-vitamin D 400 units (CALTRATE) 600-400 MG-UNIT per tablet Take 1 tablet by mouth 2 times daily (with meals), Transitional      DULoxetine (CYMBALTA) 20 MG EC capsule Take 20 mg by mouth daily, Historical      fluticasone (FLONASE) 50 MCG/ACT nasal spray Spray 1 spray into both nostrils 2 times daily, Historical      fluticasone (FLOVENT HFA) 220 MCG/ACT Inhaler Inhale 2 puffs into the lungs 2 times daily, Historical      levothyroxine (SYNTHROID/LEVOTHROID) 100 MCG tablet Take 100 mcg by mouth daily , Historical      magnesium oxide (MAG-OX) 400 MG tablet Take 1 tablet (400 mg) by mouth 2 times daily, Disp-180 tablet, R-3, E-PrescribeIncrease in dose      melatonin 3 MG tablet Take 2 tablets (6 mg) by mouth At Bedtime, Transitional      micafungin 150 mg Inject 150 mg into the vein every 24 hours for 28 days, Disp-1 Bag, R-3, Local Print      montelukast (SINGULAIR) 10 MG tablet Take 1 tablet (10 mg) by mouth At Bedtime, Historical      multivitamin w/minerals (THERA-VIT-M) tablet Take 1 tablet by mouth daily, OTC      mycophenolate (GENERIC EQUIVALENT) 250 MG capsule Take 6 capsules (1,500 mg) by mouth 2  times daily, Disp-360 capsule, R-11, E-Prescribe      nystatin (MYCOSTATIN) 660160 UNIT/ML suspension Take 5 mLs (500,000 Units) by mouth 4 times dailyDisp-600 mL, [id number]      omeprazole (PRILOSEC) 40 MG DR capsule Take 1 capsule (40 mg) by mouth daily, Historical      potassium chloride ER (K-DUR/KLOR-CON M) 20 MEQ CR tablet Take 1 tablet (20 mEq) by mouth daily, Disp-120 tablet, R-0, E-Prescribe      predniSONE (DELTASONE) 5 MG tablet Take 1 tablet (5 mg) by mouth daily, Disp-30 tablet, R-0, E-Prescribe      rosuvastatin (CRESTOR) 10 MG tablet Take 1 tablet (10 mg) by mouth daily, Disp-90 tablet, R-1, E-Prescribe      senna (SENOKOT) 8.6 MG tablet Take 2 tablets by mouth 2 times daily as needed for constipation, Historical      tacrolimus (GENERIC EQUIVALENT) 1 MG capsule Take 2 mg by mouth  in the morning and  3 mg  in the evening, Historical         STOP taking these medications       bumetanide (BUMEX) 1 MG tablet Comments:   Reason for Stopping:         sulfamethoxazole-trimethoprim (BACTRIM DS/SEPTRA DS) 800-160 MG tablet Comments:   Reason for Stopping:               DISCHARGE DISPOSITION: Everton Larios will discharge to home in stable condition.     DISCHARGE INSTRUCTIONS:  Discharge Procedure Orders   Medication Therapy Management Referral   Standing Status: Future   Referral Priority: Routine Referral Type: Med Therapy Management   Requested Specialty: Pharmacist   Number of Visits Requested: 1     Reason for your hospital stay   Order Comments: You were hospitalized for an acute kidney injury     [facility]ult [facility] Follow-up and recommended labs and tests   Order Comments: 1.  Follow up labs on Tuesday 5/28:  BMP (to check kidney function), hemoglobin level,  and also tacrolimus level   2.  Follow up with Elin Jensen NP as scheduled in [place]  3.  Your transplant coordinator will call you early next week to review labs and instructions    Appointments on [place] and/or [facility]  (with [facility] or [facility] provider or service). Call [phone] if you haven't heard regarding these appointments within 7 days of discharge.     Activity   Order Comments: Your activity upon discharge: activity as tolerated     Order Specific Question Answer Comments   Is discharge order? Yes      Discharge Instructions   Order Comments: 1. DO NOT adjust your medications without talking to your heart transplant providers first  2. Increase your fluid intake to 2 liters or more a day to help hydrate your kidneys.  3. Continue to hold your bumex until we instruct you.     Full Code     Order Specific Question Answer Comments   Code status determined by: Discussion with patient/legal decision maker      Diet   Order Comments: Follow this diet upon discharge: Orders Placed This Encounter      Low Saturated Fat Na <2400 mg     Order Specific Question Answer Comments   Is discharge order? Yes        >30 minutes spent in discharge, including >50% in counseling and coordination of care, medication review and plan of care recommended on follow up. Questions were answered to the patient's satisfaction.     It was our pleasure to care for Everton during his hospitalization. Please do not hesitate to contact me for any questions or concerns regarding the discharge plan.      My LUJAN, CNP  [facility] II Advanced Heart Failure Service  5/26/2019  [phone]

## 2019-05-26 NOTE — PROVIDER NOTIFICATION
"MD paged [id number] and notified about elevated BP BP (!) 148/100 (BP Location: Left arm, Cuff Size: Adult Regular)   Pulse 94   Temp 98.3  F (36.8  C) (Oral)   Resp 18   Ht 1.727 m (5' 8\")   Wt 56.7 kg (125 lb)   SpO2 98%   BMI 19.01 kg/m  RA. Pt let me check a few times but doesn't want to be bothered during the night.Did let MD know.Pt has not been complinent with cares and questions.  "

## 2019-05-27 ENCOUNTER — INFUSION - HEALTHEAST (OUTPATIENT)
Dept: INFUSION THERAPY | Facility: CLINIC | Age: 54
End: 2019-05-27

## 2019-05-27 DIAGNOSIS — R91.8 PULMONARY NODULES: ICD-10-CM

## 2019-05-27 DIAGNOSIS — A41.52 SEPSIS DUE TO PSEUDOMONAS AERUGINOSA (H): ICD-10-CM

## 2019-05-27 DIAGNOSIS — Z94.1 HEART REPLACED BY TRANSPLANT (H): Primary | ICD-10-CM

## 2019-05-27 LAB — MISCELLANEOUS TEST: NORMAL

## 2019-05-28 ENCOUNTER — AMBULATORY - HEALTHEAST (OUTPATIENT)
Dept: PHARMACY | Facility: CLINIC | Age: 54
End: 2019-05-28

## 2019-05-28 ENCOUNTER — OFFICE VISIT (OUTPATIENT)
Dept: INFECTIOUS DISEASES | Facility: CLINIC | Age: 54
End: 2019-05-28
Attending: INTERNAL MEDICINE
Payer: COMMERCIAL

## 2019-05-28 ENCOUNTER — RECORDS - HEALTHEAST (OUTPATIENT)
Dept: ADMINISTRATIVE | Facility: OTHER | Age: 54
End: 2019-05-28

## 2019-05-28 ENCOUNTER — INFUSION - HEALTHEAST (OUTPATIENT)
Dept: INFUSION THERAPY | Facility: HOSPITAL | Age: 54
End: 2019-05-28

## 2019-05-28 VITALS
HEIGHT: 68 IN | TEMPERATURE: 98.4 F | BODY MASS INDEX: 20.23 KG/M2 | WEIGHT: 133.5 LBS | OXYGEN SATURATION: 98 % | SYSTOLIC BLOOD PRESSURE: 132 MMHG | DIASTOLIC BLOOD PRESSURE: 87 MMHG | HEART RATE: 111 BPM

## 2019-05-28 DIAGNOSIS — Z29.89 NEED FOR PNEUMOCYSTIS PROPHYLAXIS: ICD-10-CM

## 2019-05-28 DIAGNOSIS — R91.8 PULMONARY NODULES: ICD-10-CM

## 2019-05-28 DIAGNOSIS — Z94.1 HEART REPLACED BY TRANSPLANT (H): Primary | ICD-10-CM

## 2019-05-28 DIAGNOSIS — B96.5 PSEUDOMONAL BACTEREMIA: ICD-10-CM

## 2019-05-28 DIAGNOSIS — R78.81 PSEUDOMONAL BACTEREMIA: ICD-10-CM

## 2019-05-28 DIAGNOSIS — J15.1 PNEUMONIA OF BOTH LUNGS DUE TO PSEUDOMONAS SPECIES, UNSPECIFIED PART OF LUNG (H): Primary | ICD-10-CM

## 2019-05-28 DIAGNOSIS — A41.52 SEPSIS DUE TO PSEUDOMONAS AERUGINOSA (H): ICD-10-CM

## 2019-05-28 DIAGNOSIS — Z94.1 HEART REPLACED BY TRANSPLANT (H): ICD-10-CM

## 2019-05-28 DIAGNOSIS — D70.2 OTHER DRUG-INDUCED NEUTROPENIA (H): ICD-10-CM

## 2019-05-28 LAB
ALBUMIN SERPL-MCNC: 3.2 G/DL (ref 3.4–5)
ALP SERPL-CCNC: 111 U/L (ref 40–150)
ALT SERPL W P-5'-P-CCNC: 16 U/L (ref 0–70)
ANION GAP SERPL CALCULATED.3IONS-SCNC: 11 MMOL/L (ref 3–14)
AST SERPL W P-5'-P-CCNC: 11 U/L (ref 0–45)
BACTERIA SPEC CULT: NORMAL
BACTERIA SPEC CULT: NORMAL
BILIRUB SERPL-MCNC: 0.4 MG/DL (ref 0.2–1.3)
BUN SERPL-MCNC: 42 MG/DL (ref 7–30)
CALCIUM SERPL-MCNC: 9.1 MG/DL (ref 8.5–10.1)
CHLORIDE SERPL-SCNC: 105 MMOL/L (ref 94–109)
CO2 SERPL-SCNC: 20 MMOL/L (ref 20–32)
CREAT SERPL-MCNC: 2.7 MG/DL (ref 0.66–1.25)
ERYTHROCYTE [DISTWIDTH] IN BLOOD BY AUTOMATED COUNT: 17.2 % (ref 10–15)
FUNGUS SPEC CULT: NORMAL
FUNGUS SPEC CULT: NORMAL
GFR SERPL CREATININE-BSD FRML MDRD: 25 ML/MIN/{1.73_M2}
GLUCOSE SERPL-MCNC: 139 MG/DL (ref 70–99)
HCT VFR BLD AUTO: 29.3 % (ref 40–53)
HGB BLD-MCNC: 9.2 G/DL (ref 13.3–17.7)
MAGNESIUM SERPL-MCNC: 1.5 MG/DL (ref 1.6–2.3)
MCH RBC QN AUTO: 30.7 PG (ref 26.5–33)
MCHC RBC AUTO-ENTMCNC: 31.4 G/DL (ref 31.5–36.5)
MCV RBC AUTO: 98 FL (ref 78–100)
PHOSPHATE SERPL-MCNC: 3.3 MG/DL (ref 2.5–4.5)
PLATELET # BLD AUTO: 162 10E9/L (ref 150–450)
POTASSIUM SERPL-SCNC: 3.5 MMOL/L (ref 3.4–5.3)
PROT SERPL-MCNC: 6.8 G/DL (ref 6.8–8.8)
RBC # BLD AUTO: 3 10E12/L (ref 4.4–5.9)
SODIUM SERPL-SCNC: 137 MMOL/L (ref 133–144)
SPECIMEN SOURCE: NORMAL
WBC # BLD AUTO: 8.7 10E9/L (ref 4–11)

## 2019-05-28 PROCEDURE — 84100 ASSAY OF PHOSPHORUS: CPT | Performed by: INTERNAL MEDICINE

## 2019-05-28 PROCEDURE — 83735 ASSAY OF MAGNESIUM: CPT | Performed by: INTERNAL MEDICINE

## 2019-05-28 PROCEDURE — 85027 COMPLETE CBC AUTOMATED: CPT | Performed by: INTERNAL MEDICINE

## 2019-05-28 PROCEDURE — 80053 COMPREHEN METABOLIC PANEL: CPT | Performed by: INTERNAL MEDICINE

## 2019-05-28 PROCEDURE — 36415 COLL VENOUS BLD VENIPUNCTURE: CPT | Performed by: INTERNAL MEDICINE

## 2019-05-28 PROCEDURE — G0463 HOSPITAL OUTPT CLINIC VISIT: HCPCS | Mod: ZF

## 2019-05-28 PROCEDURE — 80197 ASSAY OF TACROLIMUS: CPT | Performed by: INTERNAL MEDICINE

## 2019-05-28 RX ORDER — PENTAMIDINE ISETHIONATE 300 MG/300MG
300 INHALANT RESPIRATORY (INHALATION)
Status: ACTIVE | OUTPATIENT
Start: 2019-05-28 | End: 2019-08-20

## 2019-05-28 ASSESSMENT — MIFFLIN-ST. JEOR: SCORE: 1410.05

## 2019-05-28 ASSESSMENT — PAIN SCALES - GENERAL: PAINLEVEL: NO PAIN (0)

## 2019-05-28 NOTE — LETTER
5/28/2019       RE: Everton Larios  [address] [id number]     Dear Colleague,    Thank you for referring your patient, Everton Larios, to the [facility] AND INFECTIOUS DISEASES at [facility]. Please see a copy of my visit note below.    [facility]    Transplant Infectious Diseases Outpatient Progress note      Patient:  Everton Larios, Date of birth 1965, Medical record number [id number]  Date of Visit:  05/28/2019            Recommendations:   1. Discontinue cefepime after last dose today.   2. Discontinue micafungin after last dose 6/3/2019 and discontinue the PICC line.   3. Pentamidine 3 additional monthly doses (ordered) for total of 6 months post OHT.   4. Continue to check CBC with diff weekly now that the patient is back on higher dose of MMF.   5. Please continue to monitor CMV PCR weekly till further recommendations from ID when I see him next.     RTC: 1-2 months.         Summary of Presentation:   Transplants:  2/24/2019 (Heart), Postoperative day:  93     This patient is a 54 year old male with congenital heart disease s/p OHT in 2/2019 with methylprednisolone induction and TAC/MMF/prednisone for maintenance.   Presented with few days of dyspnea, cough, and subjective fever.   Found to have pseudomonal BSI and pneumonia.         Active Problems and Infectious Diseases Issues:   1. P aeruginosa BSI 4/29/2019, negative since 4/30/2019.   2. P aeruginosa HCAP.   3. Abnormal CT chest with pulmonary nodules that are improving.   4. QTc prolongation and risk for adverse events.       The P aeruginosa BSI and HCAP occurred in the setting of febrile neutropenia.   We are treating him for severe pseudomonal pneumonia with 4 week course of ABx.   Today will conclude 4 weeks of cefepime from the first negative blood cx on 4/30/2019. Cefepime was used due to QTc prolongation prohibiting the use  of fluoroquinolones.   Repeat CT chest 5/22/2019 showed significant improvement of pulmonary nodules.     Though the CT scan findings can be explained by P aeruginosa pneumonia, I was concerned about aspergillosis given the pulmonary nodules with GGO, low level of positive BD glucan at 109, and the neutropenia, I also treated the patient with micafungin starting 5/6/2019.   Will continue to conclude 4 weeks, last dose 6/3/2019.   Prolonged QTc and high costs prohibited the use of voriconazole/posaconazole and isavuconazole, respectively.      5. Neutropenia.   The neutropenia is likely to be due to drugs.   The most likely culprit drugs are MMF/TAC/VGCV/bactrim.   MMF was decreased, bactrim and VGCV were held. The last dose of bactrim 4/26/2019. CMV PCR is being checked weekly. These measures, in addition to 2 doses of G-CSF on 5/1/2019 and 5/2/2019, resulted in resolution of neutropenia.     MMF was increased again 5/24/2019 given declining EF though no evidence of rejection on biopsy.    Will need to continue to monitor CBC with diff now that the the MMF was increased.   Will need to continue to monitor CMV PCR weekly now that the MMF was increased.         Old Problems and Infectious Diseases Issues:   None.     Other Infectious Disease issues include:  - QTc 502 5/6/2019.   - PCP prophylaxis: off of bactrim since 4/26/2019. Will give 3 additional doses of monthly pentamidine.   - Serostatus: CMV D+/R+, EBV D+/R+, HSV1+/2-, VZV + off of VGCV due to neutropenia. We are checking CMV PCR weekly.   - Immunization status: good candidate for shingrix. Also repeat 23-valent pneumovax sometime after 9/26/2019.   - Gamma globulin status: 358 5/1/2019.     Attestation:  I interviewed the patient and obtained history from the patient, and by reviewing the patient's chart including outside records, microbiological data, and radiological data. All data are summarized in this notes.  Ric Shukla   Pager:  [phone]  05/28/2019         Interim History:   No fever, chills, cough, or any other complaints.         HPI:       Transplants:  2/24/2019 (Heart); Postoperative day:  93  53 yo male with congenital heart disease s/p OHT in 2/2019 with methylprednisolone induction and TAC/MMF/prednisone for maintenance.     The course was complicated by neutropenia late 4/2019. He was then admitted with fever, neutropenia dyspnea, and cough.   Was found to have multiple pulmonary nodules with GGO on chest CT. Blood cx on 4/29/2019 and BAL on 4/30/2019 grew P aeruginosa.   Treated with cefepime IV.   Due to multiple risk factors for aspergillosis including neutropenia, also empirically treated with micafunin IV starting 5/6/2019.   We could not use azoles and fluoroquinolones due to prolonged QTc at baseline. Costs prohibited the use of isavuconazole.     Neutropenia resolved after the 2 doses of G-CSF on 5/1/2019 and 5/22019. Also after the discontinuation of VGCV and bactrim and decreasing the dose of MMF.     On 5/24/2019, TTE showed decrease EF. MMF dose was increased though right heart Bx did not show rejection.   Presented with few days of dyspnea, cough, and subjective fever.   Found to have pseudomonal BSI and pneumonia.              Past Medical History:     Past Medical History:   Diagnosis Date     Alcohol abuse      Pierce's esophagus 10/4/2018     Chronic rhinitis 10/4/2018     Chronic systolic heart failure (H) 10/4/2018     Depression 10/4/2018     Diastolic dysfunction      DJD (degenerative joint disease) - neck 10/4/2018     H/O congenital atrial septal defect (ASD) repair at age 3 10/4/2018     Hypothyroidism due to medication 10/4/2018     ICD, [employer] 2008; gen change 2/2018 10/4/2018     Intermittent asthma without complication 10/4/2018     Nonischemic cardiomyopathy (H) 10/4/2018     On amiodarone therapy 10/4/2018     Paroxysmal atrial fibrillation (H) 10/4/2018     S/P ablation of atrial  fibrillation 10/4/2018     Systolic heart failure (H)      Ulcerative colitis (H) 10/4/2018     Ventricular tachycardia (H) 10/4/2018             Past Surgical History:     Past Surgical History:   Procedure Laterality Date     AICD, DUAL CHAMBER       BRONCHOSCOPY (RIGID OR FLEXIBLE), DIAGNOSTIC N/A 4/30/2019    Procedure: BRONCHOSCOPY, WITH BAL;  Surgeon: Margot Chiang MD;  Location:  GI     CV HEART BIOPSY N/A 3/4/2019    Procedure: Heart Biopsy;  Surgeon: Abhijeet Titus MD;  Location:  HEART CARDIAC CATH LAB     CV HEART BIOPSY N/A 3/25/2019    Procedure: Heart Biopsy;  Surgeon: Yves Rodrigues MD;  Location:  HEART CARDIAC CATH LAB     CV HEART BIOPSY N/A 3/28/2019    Procedure: Heart Cath Heart Biopsy;  Surgeon: Yves Rodrigues MD;  Location:  HEART CARDIAC CATH LAB     CV HEART BIOPSY N/A 4/10/2019    Procedure: CV HEART BIOPSY;  Surgeon: Isiah Mcallister MD;  Location:  HEART CARDIAC CATH LAB     CV HEART BIOPSY N/A 4/24/2019    Procedure: CV HEART BIOPSY;  Surgeon: Isiah Mcallister MD;  Location:  HEART CARDIAC CATH LAB     CV HEART BIOPSY N/A 5/8/2019    Procedure: CV HEART BIOPSY;  Surgeon: Isiah Mcallister MD;  Location:  HEART CARDIAC CATH LAB     CV INTRA-AORTIC BALLOON PUMP INSERTION N/A 2/18/2019    Procedure: Intra-Aortic Balloon;  Surgeon: Alton Solomon MD;  Location:  HEART CARDIAC CATH LAB     CV INTRA-AORTIC BALLOON PUMP INSERTION N/A 2/20/2019    Procedure: Replace subclavian IABP;  Surgeon: Yves Rodrigues MD;  Location:  HEART CARDIAC CATH LAB     CV LEFT HEART CATH N/A 3/19/2019    Procedure: Left Heart Cath;  Surgeon: Yves Rodrigues MD;  Location:  HEART CARDIAC CATH LAB     CV RIGHT HEART CATH N/A 3/19/2019    Procedure: RHC/HBx - Femoral access for 3/19 per Nimisha GONZALEZ;  Surgeon: Yves Rodrigues MD;  Location:  HEART CARDIAC CATH LAB     CV RIGHT HEART CATH N/A 3/25/2019    Procedure: Right Heart Cath;  Surgeon:  Yves Rodrigues MD;  Location:  HEART CARDIAC CATH LAB     CV RIGHT HEART CATH N/A 3/28/2019    Procedure: Heart Cath Right Heart Cath;  Surgeon: Yves Rodrigues MD;  Location:  HEART CARDIAC CATH LAB     CV RIGHT HEART CATH N/A 4/24/2019    Procedure: CV RIGHT HEART CATH;  Surgeon: Isiah Mcallister MD;  Location:  HEART CARDIAC CATH LAB     CV RIGHT HEART CATH N/A 3/11/2019    Procedure: ADD ON RHC/HBX;  Surgeon: Alton Solomon MD;  Location:  HEART CARDIAC CATH LAB     INCISION AND DRAINAGE CHEST WASHOUT, COMBINED N/A 3/21/2019    Procedure: Incision And Drainage; Evacuation Right Chest Wall Hematoma;  Surgeon: Dewayne House MD;  Location: [facility] OR     INSERT INTRAAORTIC BALLOON PUMP Left 2/19/2019    Procedure: Insert left  Subclavian Balloon Pump,  Removal Right femoral arterial balloom pump sheath;  Surgeon: Dewayne House MD;  Location: [facility] OR     INSERT INTRAAORTIC BALLOON PUMP Left 2/21/2019    Procedure: SUBCLAVIAN BALLOON PUMP PLACEMENT;  Surgeon: Ben White MD;  Location: [facility] OR     IR PICC PLACEMENT > 5 YRS OF AGE  5/8/2019     TRANSPLANT HEART RECIPIENT N/A 2/24/2019    Procedure: TRANSPLANT HEART RECIPIENT;  Surgeon: Dewayne House MD;  Location: [facility] OR               Social History:     Social History     Tobacco Use     Smoking status: Former Smoker     Years: 10.00     Smokeless tobacco: Never Used   Substance Use Topics     Alcohol use: Yes     Alcohol/week: 0.6 oz     Types: 1 Cans of beer per week     Comment: a couple times a week              Family History:     Family History   Problem Relation Age of Onset     Endometrial Cancer Mother      Hypertension Father      Endometrial Cancer Maternal Grandmother              Immunizations:     Immunization History   Administered Date(s) Administered     Influenza (IIV3) PF 10/22/2008, 09/25/2009, 11/07/2011, 11/30/2012, 10/27/2013, 10/02/2014     Influenza Vaccine IM 3yrs+ 4 Valent IIV4 10/02/2015      Influenza Vaccine, 3 YRS +, IM (QUADRIVALENT W/PRESERVATIVES) 09/12/2016, 10/16/2017, 09/20/2018     OPV, trivalent, live 10/20/1978     Pneumo Conj 13-V (2010&after) 09/26/2018     TDAP Vaccine (Boostrix) 07/20/2012     Td (Adult), Adsorbed 10/20/1978             Allergies:     Allergies   Allergen Reactions     Hydromorphone Other (See Comments)     Significant Delirium     Adhesive Tape Blisters     Tegaderm causes bruises, blisters and extreme irritation.     Lisinopril Other (See Comments)     hypotension     Quinolones Other (See Comments)     Would not rx quinolones until QTc interval improved.      Tobramycin Other (See Comments)     Would not use aminoglycosides as his kidney function is very borderline     Codeine Nausea             Medications:     Current Outpatient Medications   Medication Sig     acetaminophen (TYLENOL) 325 MG tablet Take 2 tablets (650 mg) by mouth every 4 hours as needed for mild pain     albuterol (PROAIR HFA/PROVENTIL HFA/VENTOLIN HFA) 108 (90 Base) MCG/ACT inhaler Inhale 2 puffs into the lungs every 6 hours as needed for shortness of breath / dyspnea or wheezing     amLODIPine (NORVASC) 5 MG tablet Take 1 tablet (5 mg) by mouth daily     aspirin (ASA) 81 MG chewable tablet Take 1 tablet (81 mg) by mouth daily     balsalazide (COLAZAL) 750 MG capsule TAKE 2 CAPSULES BY MOUTH TWICE DAILY     calcium carbonate (TUMS) 500 MG chewable tablet Take 1 tablet (500 mg) by mouth daily as needed for heartburn     calcium carbonate 600 mg-vitamin D 400 units (CALTRATE) 600-400 MG-UNIT per tablet Take 1 tablet by mouth 2 times daily (with meals)     ceFEPIme (MAXIPIME) 2 G vial Inject 2 g into the vein every 24 hours     DULoxetine (CYMBALTA) 20 MG EC capsule Take 20 mg by mouth daily     fluticasone (FLONASE) 50 MCG/ACT nasal spray Spray 1 spray into both nostrils 2 times daily     fluticasone (FLOVENT HFA) 220 MCG/ACT Inhaler Inhale 2 puffs into the lungs 2 times daily     levothyroxine  "(SYNTHROID/LEVOTHROID) 100 MCG tablet Take 100 mcg by mouth daily      magnesium oxide (MAG-OX) 400 MG tablet Take 1 tablet (400 mg) by mouth 2 times daily     melatonin 3 MG tablet Take 2 tablets (6 mg) by mouth At Bedtime     micafungin 150 mg Inject 150 mg into the vein every 24 hours for 28 days     montelukast (SINGULAIR) 10 MG tablet Take 1 tablet (10 mg) by mouth At Bedtime     multivitamin w/minerals (THERA-VIT-M) tablet Take 1 tablet by mouth daily     mycophenolate (GENERIC EQUIVALENT) 250 MG capsule Take 6 capsules (1,500 mg) by mouth 2 times daily     nystatin (MYCOSTATIN) 963261 UNIT/ML suspension Take 5 mLs (500,000 Units) by mouth 4 times daily     omeprazole (PRILOSEC) 40 MG DR capsule Take 1 capsule (40 mg) by mouth daily     potassium chloride ER (K-DUR/KLOR-CON M) 20 MEQ CR tablet Take 1 tablet (20 mEq) by mouth daily     predniSONE (DELTASONE) 5 MG tablet Take 1 tablet (5 mg) by mouth daily     rosuvastatin (CRESTOR) 10 MG tablet Take 1 tablet (10 mg) by mouth daily     senna (SENOKOT) 8.6 MG tablet Take 2 tablets by mouth 2 times daily as needed for constipation     tacrolimus (GENERIC EQUIVALENT) 1 MG capsule Take 2 mg by mouth  in the morning and  3 mg  in the evening     No current facility-administered medications for this visit.      Facility-Administered Medications Ordered in Other Visits   Medication     heparin lock flush 10 UNIT/ML injection 5 mL            Review of Systems:   As mentioned in the interim history otherwise negative by reviewing constitutional symptoms, central and peripheral neurological systems, respiratory system, cardiac system, GI system,  system, musculoskeletal, skin, allergy, and lymphatics.                  Physical Exam:     Vitals:    05/28/19 1437   BP: 132/87   Pulse: 111   Temp: 98.4  F (36.9  C)   TempSrc: Oral   SpO2: 98%   Weight: 60.6 kg (133 lb 8 oz)   Height: 1.727 m (5' 8\")     Wt Readings from Last 4 Encounters:   05/28/19 60.6 kg (133 lb 8 oz) "   05/26/19 57.2 kg (126 lb 1.6 oz)   05/24/19 59.6 kg (131 lb 6.4 oz)   05/15/19 55 kg (121 lb 4.8 oz)       Constitutional: awake, alert, cooperative, no apparent distress and appears at stated age, well nourished.   Head, ENT, Eyes, and Neck: Normocephalic, sinuses non-tender to palpation, external ears without lesions, moist buccal mucosa without oral thrush, tonsils without swelling, erythema, or exudate, no tenderness palpating teeth, good dentition, gums without necrosis or abscesses.   PERRL, EOMI, pink conjunctivae, non-icteric sclera.   Neck supple without rigidity, no cervical/axillary/inguinal LA bilaterally.   Neurologic: Patient is moving all extremities without focal deficit, no focal sensory loss.   Lungs: CTA bilaterally, no accessory muscle use, no dullness to percussion and no abnormal tactile fremitus.   CVS: RRR, normal S1/S2, no murmur, PMI was not displaced.   Abdomen: non-tender, non-distended, no masses, no bruit, no shifting dullness, normal BS.   Extremities: no pitting edema of bilateral lower extremities, no ulcers, normal ROM of all joints, no swelling or erythema of any of joints and no tenderness to palpation.   Skin: no induration, fluctuation or discharge, and no rash but multiple bruises.             Laboratory Data:     No results found for: ACD4    Inflammatory Markers    Recent Labs   Lab Test 05/15/19  0940 05/02/19  0536 04/29/19  0911 11/15/18  0955 10/22/18  1239   SED  --  37*  --   --   --    CRP <2.9 69.0* 120.0* 6.1 12.5       Immune Globulin Studies      Recent Labs   Lab Test 05/01/19  1850   *   IGM 29*   IGE 38          Metabolic Studies    Recent Labs   Lab Test 05/26/19  1401 05/26/19  0601 05/25/19  1626 05/25/19  0838 05/25/19  0006 05/24/19  1655 05/22/19  0926 05/15/19  0940  04/29/19  0918 04/29/19  0911  02/23/19  1835    138 138 139 136 134 138 132*   < >  --  131*   < > 133   POTASSIUM 3.7 3.6 4.0 4.0 3.9 4.1 3.7 5.0   < >  --  3.5   < >  3.6   CHLORIDE 106 108 106 106 104 102 102 100   < >  --  94   < > 93*   CO2 21 21 19* 21 22 21 25 24   < >  --  24   < > 30   ANIONGAP 11 9 12 11 10 11 10 8   < >  --  12   < > 10   BUN 55* 56* 62* 60* 65* 63* 53* 58*   < >  --  23   < > 28   CR 3.32* 3.44* 3.45* 3.60* 3.63* 3.43* 2.43* 2.17*   < >  --  1.04   < > 1.03   GFRESTIMATED 20* 19* 19* 18* 18* 19* 29* 33*   < >  --  80   < > 81   * 72 264* 75 101* 164* 82 82   < >  --  79   < > 114*   A1C  --   --   --   --   --   --   --   --   --   --   --   --  5.7*   RADAMES 8.9 9.3 8.4* 9.2 9.1 9.2 9.3 10.1   < >  --  9.0   < > 8.9   PHOS  --   --   --   --   --   --  4.2 3.8  --   --   --    < > 3.6   MAG  --   --   --   --   --   --  1.9 1.8   < >  --   --    < > 2.0   LACT  --   --   --   --   --   --   --   --   --  1.7 1.6   < >  --    CKT  --   --   --   --   --   --  26*  --   --   --   --   --   --     < > = values in this interval not displayed.       Hepatic Studies    Recent Labs   Lab Test 05/25/19  0006 05/24/19  1655 05/22/19  0926 05/15/19  0940 04/29/19  0911 04/04/19  0840  03/14/19  1653  03/10/19  0410   BILITOTAL 0.3 0.4 0.4 0.4 1.0 0.6   < >  --    < > 0.8   ALKPHOS 91 113 109 127 125 139   < >  --    < > 104   PROTTOTAL 6.5* 7.2 7.3 7.6 7.1 5.8*   < >  --    < > 5.0*   ALBUMIN 3.1* 3.2* 3.5 3.7 3.5 2.9*   < >  --    < > 2.8*   AST 16 13 15 22 10 16   < >  --    < > 22   ALT 15 18 20 22 17 26   < >  --    < > 19   LDH  --   --   --   --   --   --   --  320*  --  276*    < > = values in this interval not displayed.       Hematology Studies     Recent Labs   Lab Test 05/26/19  0601 05/25/19  0006 05/24/19  1655 05/22/19  0926 05/15/19  0940 05/08/19  0504 05/07/19  0451 05/06/19  0438   WBC 8.5 8.1 8.9 9.0 9.6 6.3 6.6 7.4   ANEU  --   --  7.6 6.7 6.8 4.4 4.2 4.8   ALYM  --   --  0.8 1.3 2.0 1.0 1.4 1.4   GRACE  --   --  0.2 0.3 0.7 0.7 0.9 0.7   AEOS  --   --  0.1 0.2 0.1 0.1 0.0 0.0   HGB 8.6* 8.2* 9.0* 9.7* 11.0* 9.0* 8.4* 8.3*   HCT 28.0*  27.3* 28.1* 32.0* 35.1* 28.7* 28.1* 27.2*    166 191 269 376 372 336 323       Clotting Studies    Recent Labs   Lab Test 05/08/19  0504 05/07/19  0451 05/06/19  0438 05/05/19  0720  03/14/19  1653  02/27/19  0421 02/25/19  1502 02/24/19  1817   INR 1.22* 1.33* 1.31* 1.33*   < > 1.36*   < >  --  1.29* 1.50*   PTT  --   --   --   --   --  46*  --  38* 32 55*    < > = values in this interval not displayed.       Urine Studies    Recent Labs   Lab Test 05/26/19  1006 04/29/19  1031 03/26/19  1707 03/17/19  0045 03/04/19  1303   URINEPH 6.5 6.0 5.5 5.5 5.0   NITRITE Negative Negative Negative Negative Negative   LEUKEST Negative Negative Negative Negative Negative   WBCU 0 <1 2 15* 0         Microbiology:  Last 6 Culture results with specimen source  Culture Micro   Date Value Ref Range Status   05/02/2019 No growth  Final   05/02/2019 No growth  Final   05/02/2019 (A)  Final    Canceled, Test credited  >10 Squamous epithelial cells/low power field indicates oral contamination. Please   recollect.     05/01/2019 No growth  Final   05/01/2019 No growth  Final   04/30/2019 (A)  Final    Canceled, Test credited  >10 Squamous epithelial cells/low power field indicates oral contamination. Please   recollect.     04/30/2019   Final    Notification of test cancellation was given to  ASHLIE SCOTT RN [id number] 4.30.19 [facility]      Specimen Description   Date Value Ref Range Status   05/02/2019 Blood Right Hand  Final   05/02/2019 Blood Right Hand  Final   05/02/2019 Sputum  Final   05/02/2019 Sputum  Final   05/01/2019 Blood Left Arm  Final   05/01/2019 Blood Right Hand  Final        Last check of C difficile  C Diff Toxin B PCR   Date Value Ref Range Status   04/29/2019 Negative NEG^Negative Final     Comment:     Negative: Clostridium difficile target DNA sequences NOT detected, presumed   negative for Clostridium difficile toxin B or the number of bacteria present   may be below the limit of detection for the test.  FDA  approved assay performed using [employer] GeneXpert real-time PCR.  A negative result does not exclude actual disease due to Clostridium difficile   and may be due to improper collection, handling and storage of the specimen   or the number of organisms in the specimen is below the detection limit of the   assay.           Virology:  CMV viral loads    CMV viral loads    Recent Labs   Lab Test 05/24/19  1648 05/22/19  0926   CSPEC Plasma, EDTA anticoagulant EDTA PLASMA   CMVLOG Not Calculated Not Calculated       CMV viral loads    Log IU/mL of CMVQNT   Date Value Ref Range Status   05/24/2019 Not Calculated <2.1 [Log_IU]/mL Final   05/22/2019 Not Calculated <2.1 [Log_IU]/mL Final   05/15/2019 Not Calculated <2.1 [Log_IU]/mL Final   05/06/2019 Not Calculated <2.1 [Log_IU]/mL Final   04/30/2019 Not Calculated <2.1 [Log_IU]/mL Final   04/29/2019 Not Calculated <2.1 [Log_IU]/mL Final   04/26/2019 Not Calculated <2.1 [Log_IU]/mL Final   04/24/2019 Not Calculated <2.1 [Log_IU]/mL Final   04/17/2019 Not Calculated <2.1 [Log_IU]/mL Final   04/08/2019 Not Calculated <2.1 [Log_IU]/mL Final   04/01/2019 Not Calculated <2.1 [Log_IU]/mL Final   03/25/2019 Not Calculated <2.1 [Log_IU]/mL Final   03/18/2019 Not Calculated <2.1 [Log_IU]/mL Final       CMV resistance testing  No lab results found.  No results found for: CMVCID, CMVFOS, CMVGAN     EBV viral loads     Recent Labs   Lab Test 05/22/19  0926 04/29/19  0911 04/26/19  0848 03/27/19  0521   EBRES 541* EBV DNA Not Detected EBV DNA Not Detected EBV DNA Not Detected     EBV DNA Copies/mL   Date Value Ref Range Status   05/22/2019 541 (A) EBVNEG^EBV DNA Not Detected [Copies]/mL Final   04/29/2019 EBV DNA Not Detected EBVNEG^EBV DNA Not Detected [Copies]/mL Final   04/26/2019 EBV DNA Not Detected EBVNEG^EBV DNA Not Detected [Copies]/mL Final   03/27/2019 EBV DNA Not Detected EBVNEG^EBV DNA Not Detected [Copies]/mL Final       Human Herpes Virus 6 viral loads    No results found  for: H6RES No results found for: H6SPEC    CMV Antibody IgG   Date Value Ref Range Status   02/23/2019 >8.0 (H) 0.0 - 0.8 AI Final     Comment:     Positive  Antibody index (AI) values reflect qualitative changes in antibody   concentration that cannot be directly associated with clinical condition or   disease state.     02/15/2019 >8.0 (H) 0.0 - 0.8 AI Final     Comment:     Positive  Antibody index (AI) values reflect qualitative changes in antibody   concentration that cannot be directly associated with clinical condition or   disease state.       Toxoplasma Antibody IgG   Date Value Ref Range Status   02/22/2019 <3.0 0.0 - 7.1 IU/mL Final     Comment:     Negative- Absence of detectable Toxoplasma gondii IgG antibodies. A negative   result does not rule out acute infection.  The magnitude of the measured result is not indicative of the amount of   antibody present. The concentrations of anti-Toxoplasma gondii IgG in a given   specimen determined with assays from different manufacturers can vary due to   differences in assay methods and reagent specificity.         Pathology:  Heart bx 5/22/2019  FINAL DIAGNOSIS:   Heart, allograft, right ventricle, endomyocardial biopsy:   - Acute cellular rejection: Focal mild rejection, Grade 1R/1A (ISHLT 2004)        - Antibody-mediated rejection: No evidence of antibody-mediated   rejection        - C3d and C4d are negative (see comment)     BAL 4/30/2019   Lung, lingula, bronchoalveolar lavage:    -Negative for malignancy    -No fungal or Pneumocystis organisms are identified on GMS stained   preparations.    -Viral cytopathic effect is absent.   Specimen Adequacy: Satisfactory for evaluation.       Imaging:  CT chest 5/22/2019 reviewed by myself.   IMPRESSION:   1. Significantly decreased bilateral nodular and consolidative  opacities from 4/29/2019. No new or worsening pulmonary opacities.  These findings are likely representative of a improving infection.  2. Stable  subacute T12 vertebral body compression deformity.    Results for orders placed or performed during the hospital encounter of 05/24/19   US Renal Complete    Narrative    EXAMINATION: US RENAL COMPLETE, 5/25/2019 12:30 PM     COMPARISON: 3/26/2018    HISTORY: [facility]-patient post heart transplant     FINDINGS:    Right kidney: Measures 12.3 cm in length. Parenchyma is of normal  thickness. Mildly increased cortical echogenicity. No focal mass. No  hydronephrosis.    Left kidney: Measures 10.4 cm in length. Parenchyma is of normal  thickness. Mildly increased cortical echogenicity. No focal mass. No  hydronephrosis.     Bladder: Unremarkable.      Impression    IMPRESSION:  Mildly increased cortical echogenicity bilaterally, suggestive of  medical renal disease. No hydronephrosis.    I have personally reviewed the examination and initial interpretation  and I agree with the findings.    RUFUS CORDERO MD       Cardiology   TTE 5/25/2019  Interpretation Summary  Moderate concentric wall thickening consistent with left ventricular  hypertrophy is present.  Mildly (EF 40-45%) reduced left ventricular function is present.  Mild right ventricular dilation and right ventricular function is mildly  reduced.  Moderate mitral insufficiency.  Mild to moderate tricuspid insufficiency.  Moderate pulmonary hypertension with dilated IVC.     This study was compared with the study from 5/22/19 and MR is more prominent.    Again, thank you for allowing me to participate in the care of your patient.      Sincerely,    Ric Shukla MD

## 2019-05-28 NOTE — PROGRESS NOTES
[facility]    Transplant Infectious Diseases Outpatient Progress note      Patient:  Everton Larios, Date of birth 1965, Medical record number [id number]  Date of Visit:  05/28/2019            Recommendations:   1. Discontinue cefepime after last dose today.   2. Discontinue micafungin after last dose 6/3/2019 and discontinue the PICC line.   3. Pentamidine 3 additional monthly doses (ordered) for total of 6 months post OHT.   4. Continue to check CBC with diff weekly now that the patient is back on higher dose of MMF.   5. Please continue to monitor CMV PCR weekly till further recommendations from ID when I see him next.     RTC: 1-2 months.         Summary of Presentation:   Transplants:  2/24/2019 (Heart), Postoperative day:  93     This patient is a 54 year old male with congenital heart disease s/p OHT in 2/2019 with methylprednisolone induction and TAC/MMF/prednisone for maintenance.   Presented with few days of dyspnea, cough, and subjective fever.   Found to have pseudomonal BSI and pneumonia.         Active Problems and Infectious Diseases Issues:   1. P aeruginosa BSI 4/29/2019, negative since 4/30/2019.   2. P aeruginosa HCAP.   3. Abnormal CT chest with pulmonary nodules that are improving.   4. QTc prolongation and risk for adverse events.       The P aeruginosa BSI and HCAP occurred in the setting of febrile neutropenia.   We are treating him for severe pseudomonal pneumonia with 4 week course of ABx.   Today will conclude 4 weeks of cefepime from the first negative blood cx on 4/30/2019. Cefepime was used due to QTc prolongation prohibiting the use of fluoroquinolones.   Repeat CT chest 5/22/2019 showed significant improvement of pulmonary nodules.     Though the CT scan findings can be explained by P aeruginosa pneumonia, I was concerned about aspergillosis given the pulmonary nodules with GGO, low level of positive BD glucan at 109, and the neutropenia, I also  treated the patient with micafungin starting 5/6/2019.   Will continue to conclude 4 weeks, last dose 6/3/2019.   Prolonged QTc and high costs prohibited the use of voriconazole/posaconazole and isavuconazole, respectively.      5. Neutropenia.   The neutropenia is likely to be due to drugs.   The most likely culprit drugs are MMF/TAC/VGCV/bactrim.   MMF was decreased, bactrim and VGCV were held. The last dose of bactrim 4/26/2019. CMV PCR is being checked weekly. These measures, in addition to 2 doses of G-CSF on 5/1/2019 and 5/2/2019, resulted in resolution of neutropenia.     MMF was increased again 5/24/2019 given declining EF though no evidence of rejection on biopsy.    Will need to continue to monitor CBC with diff now that the the MMF was increased.   Will need to continue to monitor CMV PCR weekly now that the MMF was increased.         Old Problems and Infectious Diseases Issues:   None.     Other Infectious Disease issues include:  - QTc 502 5/6/2019.   - PCP prophylaxis: off of bactrim since 4/26/2019. Will give 3 additional doses of monthly pentamidine.   - Serostatus: CMV D+/R+, EBV D+/R+, HSV1+/2-, VZV + off of VGCV due to neutropenia. We are checking CMV PCR weekly.   - Immunization status: good candidate for shingrix. Also repeat 23-valent pneumovax sometime after 9/26/2019.   - Gamma globulin status: 358 5/1/2019.         Attestation:  I interviewed the patient and obtained history from the patient, and by reviewing the patient's chart including outside records, microbiological data, and radiological data. All data are summarized in this notes.  Ric Shukla   Pager: [phone]  05/28/2019           Interim History:   No fever, chills, cough, or any other complaints.         HPI:       Transplants:  2/24/2019 (Heart); Postoperative day:  93  55 yo male with congenital heart disease s/p OHT in 2/2019 with methylprednisolone induction and TAC/MMF/prednisone for maintenance.     The course was  complicated by neutropenia late 4/2019. He was then admitted with fever, neutropenia dyspnea, and cough.   Was found to have multiple pulmonary nodules with GGO on chest CT. Blood cx on 4/29/2019 and BAL on 4/30/2019 grew P aeruginosa.   Treated with cefepime IV.   Due to multiple risk factors for aspergillosis including neutropenia, also empirically treated with micafunin IV starting 5/6/2019.   We could not use azoles and fluoroquinolones due to prolonged QTc at baseline. Costs prohibited the use of isavuconazole.     Neutropenia resolved after the 2 doses of G-CSF on 5/1/2019 and 5/22019. Also after the discontinuation of VGCV and bactrim and decreasing the dose of MMF.     On 5/24/2019, TTE showed decrease EF. MMF dose was increased though right heart Bx did not show rejection.   Presented with few days of dyspnea, cough, and subjective fever.   Found to have pseudomonal BSI and pneumonia.              Past Medical History:     Past Medical History:   Diagnosis Date     Alcohol abuse      Pierce's esophagus 10/4/2018     Chronic rhinitis 10/4/2018     Chronic systolic heart failure (H) 10/4/2018     Depression 10/4/2018     Diastolic dysfunction      DJD (degenerative joint disease) - neck 10/4/2018     H/O congenital atrial septal defect (ASD) repair at age 3 10/4/2018     Hypothyroidism due to medication 10/4/2018     ICD, [place] [employer] 2008; gen change 2/2018 10/4/2018     Intermittent asthma without complication 10/4/2018     Nonischemic cardiomyopathy (H) 10/4/2018     On amiodarone therapy 10/4/2018     Paroxysmal atrial fibrillation (H) 10/4/2018     S/P ablation of atrial fibrillation 10/4/2018     Systolic heart failure (H)      Ulcerative colitis (H) 10/4/2018     Ventricular tachycardia (H) 10/4/2018             Past Surgical History:     Past Surgical History:   Procedure Laterality Date     AICD, DUAL CHAMBER       BRONCHOSCOPY (RIGID OR FLEXIBLE), DIAGNOSTIC N/A 4/30/2019    Procedure:  BRONCHOSCOPY, WITH BAL;  Surgeon: Margot Chiang MD;  Location:  GI     CV HEART BIOPSY N/A 3/4/2019    Procedure: Heart Biopsy;  Surgeon: Abhijeet Titus MD;  [facility]     CV HEART BIOPSY N/A 3/25/2019    Procedure: Heart Biopsy;  Surgeon: Yves Rodrigues MD;  Location:  HEART CARDIAC CATH LAB     CV HEART BIOPSY N/A 3/28/2019    Procedure: Heart Cath Heart Biopsy;  Surgeon: Yves Rodrigues MD;  Location:  HEART CARDIAC CATH LAB     CV HEART BIOPSY N/A 4/10/2019    Procedure: CV HEART BIOPSY;  Surgeon: Isiah Mcallister MD;  Location:  HEART CARDIAC CATH LAB     CV HEART BIOPSY N/A 4/24/2019    Procedure: CV HEART BIOPSY;  Surgeon: Isiah Mcallister MD;  Location:  HEART CARDIAC CATH LAB     CV HEART BIOPSY N/A 5/8/2019    Procedure: CV HEART BIOPSY;  Surgeon: Isiah Mcallister MD;  Location:  HEART CARDIAC CATH LAB     CV INTRA-AORTIC BALLOON PUMP INSERTION N/A 2/18/2019    Procedure: Intra-Aortic Balloon;  Surgeon: Alton Solomon MD;  Location:  HEART CARDIAC CATH LAB     CV INTRA-AORTIC BALLOON PUMP INSERTION N/A 2/20/2019    Procedure: Replace subclavian IABP;  Surgeon: Yves Rodrigues MD;  Location:  HEART CARDIAC CATH LAB     CV LEFT HEART CATH N/A 3/19/2019    Procedure: Left Heart Cath;  Surgeon: Yves Rodrigues MD;  Location:  HEART CARDIAC CATH LAB     CV RIGHT HEART CATH N/A 3/19/2019    Procedure: RHC/HBx - Femoral access for 3/19 per Nimisha GONZALEZ;  Surgeon: Yves Rodrigues MD;  Location:  HEART CARDIAC CATH LAB     CV RIGHT HEART CATH N/A 3/25/2019    Procedure: Right Heart Cath;  Surgeon: Yves Rodrigues MD;  Location:  HEART CARDIAC CATH LAB     CV RIGHT HEART CATH N/A 3/28/2019    Procedure: Heart Cath Right Heart Cath;  Surgeon: Yves Rodrigues MD;  Location:  HEART CARDIAC CATH LAB     CV RIGHT HEART CATH N/A 4/24/2019    Procedure: CV RIGHT HEART CATH;  Surgeon: Isiah Mcallister MD;  Location:  [facility]     CV RIGHT HEART CATH N/A 3/11/2019    Procedure: ADD ON RHC/HBX;  Surgeon: Alton Solomon MD;  [facility]: [facility]     INCISION AND DRAINAGE CHEST WASHOUT, COMBINED N/A 3/21/2019    Procedure: Incision And Drainage; Evacuation Right Chest Wall Hematoma;  Surgeon: Dewayne House MD;  Location: [facility]     INSERT INTRAAORTIC BALLOON PUMP Left 2/19/2019    Procedure: Insert left  Subclavian Balloon Pump,  Removal Right femoral arterial balloom pump sheath;  Surgeon: Dewayne House MD;  [facility]     INSERT INTRAAORTIC BALLOON PUMP Left 2/21/2019    Procedure: SUBCLAVIAN BALLOON PUMP PLACEMENT;  Surgeon: Ben White MD;  Location: [facility]     IR PICC PLACEMENT > 5 YRS OF AGE  5/8/2019     TRANSPLANT HEART RECIPIENT N/A 2/24/2019    Procedure: TRANSPLANT HEART RECIPIENT;  Surgeon: Dewayne House MD;  Location: [facility]               Social History:     Social History     Tobacco Use     Smoking status: Former Smoker     Years: 10.00     Smokeless tobacco: Never Used   Substance Use Topics     Alcohol use: Yes     Alcohol/week: 0.6 oz     Types: 1 Cans of beer per week     Comment: a couple times a week              Family History:     Family History   Problem Relation Age of Onset     Endometrial Cancer Mother      Hypertension Father      Endometrial Cancer Maternal Grandmother              Immunizations:     Immunization History   Administered Date(s) Administered     Influenza (IIV3) PF 10/22/2008, 09/25/2009, 11/07/2011, 11/30/2012, 10/27/2013, 10/02/2014     Influenza Vaccine IM 3yrs+ 4 Valent IIV4 10/02/2015     Influenza Vaccine, 3 YRS +, IM (QUADRIVALENT W/PRESERVATIVES) 09/12/2016, 10/16/2017, 09/20/2018     OPV, trivalent, live 10/20/1978     Pneumo Conj 13-V (2010&after) 09/26/2018     TDAP Vaccine (Boostrix) 07/20/2012     Td (Adult), Adsorbed 10/20/1978             Allergies:     Allergies   Allergen Reactions     Hydromorphone  Other (See Comments)     Significant Delirium     Adhesive Tape Blisters     Tegaderm causes bruises, blisters and extreme irritation.     Lisinopril Other (See Comments)     hypotension     Quinolones Other (See Comments)     Would not rx quinolones until QTc interval improved.      Tobramycin Other (See Comments)     Would not use aminoglycosides as his kidney function is very borderline     Codeine Nausea             Medications:     Current Outpatient Medications   Medication Sig     acetaminophen (TYLENOL) 325 MG tablet Take 2 tablets (650 mg) by mouth every 4 hours as needed for mild pain     albuterol (PROAIR HFA/PROVENTIL HFA/VENTOLIN HFA) 108 (90 Base) MCG/ACT inhaler Inhale 2 puffs into the lungs every 6 hours as needed for shortness of breath / dyspnea or wheezing     amLODIPine (NORVASC) 5 MG tablet Take 1 tablet (5 mg) by mouth daily     aspirin (ASA) 81 MG chewable tablet Take 1 tablet (81 mg) by mouth daily     balsalazide (COLAZAL) 750 MG capsule TAKE 2 CAPSULES BY MOUTH TWICE DAILY     calcium carbonate (TUMS) 500 MG chewable tablet Take 1 tablet (500 mg) by mouth daily as needed for heartburn     calcium carbonate 600 mg-vitamin D 400 units (CALTRATE) 600-400 MG-UNIT per tablet Take 1 tablet by mouth 2 times daily (with meals)     ceFEPIme (MAXIPIME) 2 G vial Inject 2 g into the vein every 24 hours     DULoxetine (CYMBALTA) 20 MG EC capsule Take 20 mg by mouth daily     fluticasone (FLONASE) 50 MCG/ACT nasal spray Spray 1 spray into both nostrils 2 times daily     fluticasone (FLOVENT HFA) 220 MCG/ACT Inhaler Inhale 2 puffs into the lungs 2 times daily     levothyroxine (SYNTHROID/LEVOTHROID) 100 MCG tablet Take 100 mcg by mouth daily      magnesium oxide (MAG-OX) 400 MG tablet Take 1 tablet (400 mg) by mouth 2 times daily     melatonin 3 MG tablet Take 2 tablets (6 mg) by mouth At Bedtime     micafungin 150 mg Inject 150 mg into the vein every 24 hours for 28 days     montelukast (SINGULAIR) 10 MG  "tablet Take 1 tablet (10 mg) by mouth At Bedtime     multivitamin w/minerals (THERA-VIT-M) tablet Take 1 tablet by mouth daily     mycophenolate (GENERIC EQUIVALENT) 250 MG capsule Take 6 capsules (1,500 mg) by mouth 2 times daily     nystatin (MYCOSTATIN) 454481 UNIT/ML suspension Take 5 mLs (500,000 Units) by mouth 4 times daily     omeprazole (PRILOSEC) 40 MG DR capsule Take 1 capsule (40 mg) by mouth daily     potassium chloride ER (K-DUR/KLOR-CON M) 20 MEQ CR tablet Take 1 tablet (20 mEq) by mouth daily     predniSONE (DELTASONE) 5 MG tablet Take 1 tablet (5 mg) by mouth daily     rosuvastatin (CRESTOR) 10 MG tablet Take 1 tablet (10 mg) by mouth daily     senna (SENOKOT) 8.6 MG tablet Take 2 tablets by mouth 2 times daily as needed for constipation     tacrolimus (GENERIC EQUIVALENT) 1 MG capsule Take 2 mg by mouth  in the morning and  3 mg  in the evening     No current facility-administered medications for this visit.      Facility-Administered Medications Ordered in Other Visits   Medication     heparin lock flush 10 UNIT/ML injection 5 mL            Review of Systems:   As mentioned in the interim history otherwise negative by reviewing constitutional symptoms, central and peripheral neurological systems, respiratory system, cardiac system, GI system,  system, musculoskeletal, skin, allergy, and lymphatics.                  Physical Exam:     Vitals:    05/28/19 1437   BP: 132/87   Pulse: 111   Temp: 98.4  F (36.9  C)   TempSrc: Oral   SpO2: 98%   Weight: 60.6 kg (133 lb 8 oz)   Height: 1.727 m (5' 8\")     Wt Readings from Last 4 Encounters:   05/28/19 60.6 kg (133 lb 8 oz)   05/26/19 57.2 kg (126 lb 1.6 oz)   05/24/19 59.6 kg (131 lb 6.4 oz)   05/15/19 55 kg (121 lb 4.8 oz)       Constitutional: awake, alert, cooperative, no apparent distress and appears at stated age, well nourished.   Head, ENT, Eyes, and Neck: Normocephalic, sinuses non-tender to palpation, external ears without lesions, moist buccal " mucosa without oral thrush, tonsils without swelling, erythema, or exudate, no tenderness palpating teeth, good dentition, gums without necrosis or abscesses.   PERRL, EOMI, pink conjunctivae, non-icteric sclera.   Neck supple without rigidity, no cervical/axillary/inguinal LA bilaterally.   Neurologic: Patient is moving all extremities without focal deficit, no focal sensory loss.   Lungs: CTA bilaterally, no accessory muscle use, no dullness to percussion and no abnormal tactile fremitus.   CVS: RRR, normal S1/S2, no murmur, PMI was not displaced.   Abdomen: non-tender, non-distended, no masses, no bruit, no shifting dullness, normal BS.   Extremities: no pitting edema of bilateral lower extremities, no ulcers, normal ROM of all joints, no swelling or erythema of any of joints and no tenderness to palpation.   Skin: no induration, fluctuation or discharge, and no rash but multiple bruises.             Laboratory Data:     No results found for: ACD4    Inflammatory Markers    Recent Labs   Lab Test 05/15/19  0940 05/02/19  0536 04/29/19  0911 11/15/18  0955 10/22/18  1239   SED  --  37*  --   --   --    CRP <2.9 69.0* 120.0* 6.1 12.5       Immune Globulin Studies      Recent Labs   Lab Test 05/01/19  1850   *   IGM 29*   IGE 38          Metabolic Studies    Recent Labs   Lab Test 05/26/19  1401 05/26/19  0601 05/25/19  1626 05/25/19  0838 05/25/19  0006 05/24/19  1655 05/22/19  0926 05/15/19  0940  04/29/19  0918 04/29/19  0911  02/23/19  1835    138 138 139 136 134 138 132*   < >  --  131*   < > 133   POTASSIUM 3.7 3.6 4.0 4.0 3.9 4.1 3.7 5.0   < >  --  3.5   < > 3.6   CHLORIDE 106 108 106 106 104 102 102 100   < >  --  94   < > 93*   CO2 21 21 19* 21 22 21 25 24   < >  --  24   < > 30   ANIONGAP 11 9 12 11 10 11 10 8   < >  --  12   < > 10   BUN 55* 56* 62* 60* 65* 63* 53* 58*   < >  --  23   < > 28   CR 3.32* 3.44* 3.45* 3.60* 3.63* 3.43* 2.43* 2.17*   < >  --  1.04   < > 1.03   GFRESTIMATED  20* 19* 19* 18* 18* 19* 29* 33*   < >  --  80   < > 81   * 72 264* 75 101* 164* 82 82   < >  --  79   < > 114*   A1C  --   --   --   --   --   --   --   --   --   --   --   --  5.7*   RADAMES 8.9 9.3 8.4* 9.2 9.1 9.2 9.3 10.1   < >  --  9.0   < > 8.9   PHOS  --   --   --   --   --   --  4.2 3.8  --   --   --    < > 3.6   MAG  --   --   --   --   --   --  1.9 1.8   < >  --   --    < > 2.0   LACT  --   --   --   --   --   --   --   --   --  1.7 1.6   < >  --    CKT  --   --   --   --   --   --  26*  --   --   --   --   --   --     < > = values in this interval not displayed.       Hepatic Studies    Recent Labs   Lab Test 05/25/19 0006 05/24/19 1655 05/22/19  0926 05/15/19  0940 04/29/19  0911 04/04/19  0840  03/14/19  1653  03/10/19  0410   BILITOTAL 0.3 0.4 0.4 0.4 1.0 0.6   < >  --    < > 0.8   ALKPHOS 91 113 109 127 125 139   < >  --    < > 104   PROTTOTAL 6.5* 7.2 7.3 7.6 7.1 5.8*   < >  --    < > 5.0*   ALBUMIN 3.1* 3.2* 3.5 3.7 3.5 2.9*   < >  --    < > 2.8*   AST 16 13 15 22 10 16   < >  --    < > 22   ALT 15 18 20 22 17 26   < >  --    < > 19   LDH  --   --   --   --   --   --   --  320*  --  276*    < > = values in this interval not displayed.       Hematology Studies     Recent Labs   Lab Test 05/26/19  0601 05/25/19 0006 05/24/19 1655 05/22/19  0926 05/15/19  0940 05/08/19  0504 05/07/19  0451 05/06/19  0438   WBC 8.5 8.1 8.9 9.0 9.6 6.3 6.6 7.4   ANEU  --   --  7.6 6.7 6.8 4.4 4.2 4.8   ALYM  --   --  0.8 1.3 2.0 1.0 1.4 1.4   GRACE  --   --  0.2 0.3 0.7 0.7 0.9 0.7   AEOS  --   --  0.1 0.2 0.1 0.1 0.0 0.0   HGB 8.6* 8.2* 9.0* 9.7* 11.0* 9.0* 8.4* 8.3*   HCT 28.0* 27.3* 28.1* 32.0* 35.1* 28.7* 28.1* 27.2*    166 191 269 376 372 336 323       Clotting Studies    Recent Labs   Lab Test 05/08/19  0504 05/07/19  0451 05/06/19  0438 05/05/19  0720  03/14/19  1653  02/27/19  0421 02/25/19  1502 02/24/19  1817   INR 1.22* 1.33* 1.31* 1.33*   < > 1.36*   < >  --  1.29* 1.50*   PTT  --   --   --   --    --  46*  --  38* 32 55*    < > = values in this interval not displayed.       Urine Studies    Recent Labs   Lab Test 05/26/19  1006 04/29/19  1031 03/26/19  1707 03/17/19  0045 03/04/19  1303   URINEPH 6.5 6.0 5.5 5.5 5.0   NITRITE Negative Negative Negative Negative Negative   LEUKEST Negative Negative Negative Negative Negative   WBCU 0 <1 2 15* 0         Microbiology:  Last 6 Culture results with specimen source  Culture Micro   Date Value Ref Range Status   05/02/2019 No growth  Final   05/02/2019 No growth  Final   05/02/2019 (A)  Final    Canceled, Test credited  >10 Squamous epithelial cells/low power field indicates oral contamination. Please   recollect.     05/01/2019 No growth  Final   05/01/2019 No growth  Final   04/30/2019 (A)  Final    Canceled, Test credited  >10 Squamous epithelial cells/low power field indicates oral contamination. Please   recollect.     04/30/2019   Final    Notification of test cancellation was given to  ASHLIE SCOTT RN [id number] 4.30.19 [facility]      Specimen Description   Date Value Ref Range Status   05/02/2019 Blood Right Hand  Final   05/02/2019 Blood Right Hand  Final   05/02/2019 Sputum  Final   05/02/2019 Sputum  Final   05/01/2019 Blood Left Arm  Final   05/01/2019 Blood Right Hand  Final        Last check of C difficile  C Diff Toxin B PCR   Date Value Ref Range Status   04/29/2019 Negative NEG^Negative Final     Comment:     Negative: Clostridium difficile target DNA sequences NOT detected, presumed   negative for Clostridium difficile toxin B or the number of bacteria present   may be below the limit of detection for the test.  FDA approved assay performed using esolidar GeneXpert real-time PCR.  A negative result does not exclude actual disease due to Clostridium difficile   and may be due to improper collection, handling and storage of the specimen   or the number of organisms in the specimen is below the detection limit of the   assay.           Virology:  CMV viral  loads    CMV viral loads    Recent Labs   Lab Test 05/24/19  1648 05/22/19  0926   CSPEC Plasma, EDTA anticoagulant EDTA PLASMA   CMVLOG Not Calculated Not Calculated       CMV viral loads    Log IU/mL of CMVQNT   Date Value Ref Range Status   05/24/2019 Not Calculated <2.1 [Log_IU]/mL Final   05/22/2019 Not Calculated <2.1 [Log_IU]/mL Final   05/15/2019 Not Calculated <2.1 [Log_IU]/mL Final   05/06/2019 Not Calculated <2.1 [Log_IU]/mL Final   04/30/2019 Not Calculated <2.1 [Log_IU]/mL Final   04/29/2019 Not Calculated <2.1 [Log_IU]/mL Final   04/26/2019 Not Calculated <2.1 [Log_IU]/mL Final   04/24/2019 Not Calculated <2.1 [Log_IU]/mL Final   04/17/2019 Not Calculated <2.1 [Log_IU]/mL Final   04/08/2019 Not Calculated <2.1 [Log_IU]/mL Final   04/01/2019 Not Calculated <2.1 [Log_IU]/mL Final   03/25/2019 Not Calculated <2.1 [Log_IU]/mL Final   03/18/2019 Not Calculated <2.1 [Log_IU]/mL Final       CMV resistance testing  No lab results found.  No results found for: CMVCID, CMVFOS, CMVGAN     EBV viral loads     Recent Labs   Lab Test 05/22/19  0926 04/29/19  0911 04/26/19  0848 03/27/19  0521   EBRES 541* EBV DNA Not Detected EBV DNA Not Detected EBV DNA Not Detected     EBV DNA Copies/mL   Date Value Ref Range Status   05/22/2019 541 (A) EBVNEG^EBV DNA Not Detected [Copies]/mL Final   04/29/2019 EBV DNA Not Detected EBVNEG^EBV DNA Not Detected [Copies]/mL Final   04/26/2019 EBV DNA Not Detected EBVNEG^EBV DNA Not Detected [Copies]/mL Final   03/27/2019 EBV DNA Not Detected EBVNEG^EBV DNA Not Detected [Copies]/mL Final       Human Herpes Virus 6 viral loads    No results found for: H6RES No results found for: H6SPEC    CMV Antibody IgG   Date Value Ref Range Status   02/23/2019 >8.0 (H) 0.0 - 0.8 AI Final     Comment:     Positive  Antibody index (AI) values reflect qualitative changes in antibody   concentration that cannot be directly associated with clinical condition or   disease state.     02/15/2019 >8.0 (H)  0.0 - 0.8 AI Final     Comment:     Positive  Antibody index (AI) values reflect qualitative changes in antibody   concentration that cannot be directly associated with clinical condition or   disease state.       Toxoplasma Antibody IgG   Date Value Ref Range Status   02/22/2019 <3.0 0.0 - 7.1 IU/mL Final     Comment:     Negative- Absence of detectable Toxoplasma gondii IgG antibodies. A negative   result does not rule out acute infection.  The magnitude of the measured result is not indicative of the amount of   antibody present. The concentrations of anti-Toxoplasma gondii IgG in a given   specimen determined with assays from different manufacturers can vary due to   differences in assay methods and reagent specificity.         Pathology:  Heart bx 5/22/2019  FINAL DIAGNOSIS:   Heart, allograft, right ventricle, endomyocardial biopsy:   - Acute cellular rejection: Focal mild rejection, Grade 1R/1A (ISHLT 2004)        - Antibody-mediated rejection: No evidence of antibody-mediated   rejection        - C3d and C4d are negative (see comment)     BAL 4/30/2019   Lung, lingula, bronchoalveolar lavage:    -Negative for malignancy    -No fungal or Pneumocystis organisms are identified on GMS stained   preparations.    -Viral cytopathic effect is absent.   Specimen Adequacy: Satisfactory for evaluation.       Imaging:  CT chest 5/22/2019 reviewed by myself.   IMPRESSION:   1. Significantly decreased bilateral nodular and consolidative  opacities from 4/29/2019. No new or worsening pulmonary opacities.  These findings are likely representative of a improving infection.  2. Stable subacute T12 vertebral body compression deformity.    Results for orders placed or performed during the hospital encounter of 05/24/19   US Renal Complete    Narrative    EXAMINATION: US RENAL COMPLETE, 5/25/2019 12:30 PM     COMPARISON: 3/26/2018    HISTORY: [facility]-patient post heart transplant     FINDINGS:    Right kidney: Measures 12.3 cm in  length. Parenchyma is of normal  thickness. Mildly increased cortical echogenicity. No focal mass. No  hydronephrosis.    Left kidney: Measures 10.4 cm in length. Parenchyma is of normal  thickness. Mildly increased cortical echogenicity. No focal mass. No  hydronephrosis.     Bladder: Unremarkable.      Impression    IMPRESSION:  Mildly increased cortical echogenicity bilaterally, suggestive of  medical renal disease. No hydronephrosis.    I have personally reviewed the examination and initial interpretation  and I agree with the findings.    RUFUS CORDERO MD       Cardiology   TTE 5/25/2019  Interpretation Summary  Moderate concentric wall thickening consistent with left ventricular  hypertrophy is present.  Mildly (EF 40-45%) reduced left ventricular function is present.  Mild right ventricular dilation and right ventricular function is mildly  reduced.  Moderate mitral insufficiency.  Mild to moderate tricuspid insufficiency.  Moderate pulmonary hypertension with dilated IVC.     This study was compared with the study from 5/22/19 and MR is more prominent.

## 2019-05-28 NOTE — NURSING NOTE
Chief Complaint   Patient presents with     RECHECK     Sepsis due to Pseudomonas aeruginosa     Speedy Bell MA

## 2019-05-28 NOTE — LETTER
5/28/2019      RE: Everton Larios  [address] [facility] [id number]       [facility]    Transplant Infectious Diseases Outpatient Progress note      Patient:  Everton Larios, Date of birth 1965, Medical record number [id number]  Date of Visit:  05/28/2019            Recommendations:   1. Discontinue cefepime after last dose today.   2. Discontinue micafungin after last dose 6/3/2019 and discontinue the PICC line.   3. Pentamidine 3 additional monthly doses (ordered) for total of 6 months post OHT.   4. Continue to check CBC with diff weekly now that the patient is back on higher dose of MMF.   5. Please continue to monitor CMV PCR weekly till further recommendations from ID when I see him next.     RTC: 1-2 months.         Summary of Presentation:   Transplants:  2/24/2019 (Heart), Postoperative day:  93     This patient is a 54 year old male with congenital heart disease s/p OHT in 2/2019 with methylprednisolone induction and TAC/MMF/prednisone for maintenance.   Presented with few days of dyspnea, cough, and subjective fever.   Found to have pseudomonal BSI and pneumonia.         Active Problems and Infectious Diseases Issues:   1. P aeruginosa BSI 4/29/2019, negative since 4/30/2019.   2. P aeruginosa HCAP.   3. Abnormal CT chest with pulmonary nodules that are improving.   4. QTc prolongation and risk for adverse events.       The P aeruginosa BSI and HCAP occurred in the setting of febrile neutropenia.   We are treating him for severe pseudomonal pneumonia with 4 week course of ABx.   Today will conclude 4 weeks of cefepime from the first negative blood cx on 4/30/2019. Cefepime was used due to QTc prolongation prohibiting the use of fluoroquinolones.   Repeat CT chest 5/22/2019 showed significant improvement of pulmonary nodules.     Though the CT scan findings can be explained by P aeruginosa pneumonia, I was concerned about aspergillosis given the  pulmonary nodules with GGO, low level of positive BD glucan at 109, and the neutropenia, I also treated the patient with micafungin starting 5/6/2019.   Will continue to conclude 4 weeks, last dose 6/3/2019.   Prolonged QTc and high costs prohibited the use of voriconazole/posaconazole and isavuconazole, respectively.      5. Neutropenia.   The neutropenia is likely to be due to drugs.   The most likely culprit drugs are MMF/TAC/VGCV/bactrim.   MMF was decreased, bactrim and VGCV were held. The last dose of bactrim 4/26/2019. CMV PCR is being checked weekly. These measures, in addition to 2 doses of G-CSF on 5/1/2019 and 5/2/2019, resulted in resolution of neutropenia.     MMF was increased again 5/24/2019 given declining EF though no evidence of rejection on biopsy.    Will need to continue to monitor CBC with diff now that the the MMF was increased.   Will need to continue to monitor CMV PCR weekly now that the MMF was increased.         Old Problems and Infectious Diseases Issues:   None.     Other Infectious Disease issues include:  - QTc 502 5/6/2019.   - PCP prophylaxis: off of bactrim since 4/26/2019. Will give 3 additional doses of monthly pentamidine.   - Serostatus: CMV D+/R+, EBV D+/R+, HSV1+/2-, VZV + off of VGCV due to neutropenia. We are checking CMV PCR weekly.   - Immunization status: good candidate for shingrix. Also repeat 23-valent pneumovax sometime after 9/26/2019.   - Gamma globulin status: 358 5/1/2019.         Attestation:  I interviewed the patient and obtained history from the patient, and by reviewing the patient's chart including outside records, microbiological data, and radiological data. All data are summarized in this notes.  Ric Shukla   Pager: [phone]  05/28/2019           Interim History:   No fever, chills, cough, or any other complaints.         HPI:       Transplants:  2/24/2019 (Heart); Postoperative day:  93  55 yo male with congenital heart disease s/p OHT in 2/2019  with methylprednisolone induction and TAC/MMF/prednisone for maintenance.     The course was complicated by neutropenia late 4/2019. He was then admitted with fever, neutropenia dyspnea, and cough.   Was found to have multiple pulmonary nodules with GGO on chest CT. Blood cx on 4/29/2019 and BAL on 4/30/2019 grew P aeruginosa.   Treated with cefepime IV.   Due to multiple risk factors for aspergillosis including neutropenia, also empirically treated with micafunin IV starting 5/6/2019.   We could not use azoles and fluoroquinolones due to prolonged QTc at baseline. Costs prohibited the use of isavuconazole.     Neutropenia resolved after the 2 doses of G-CSF on 5/1/2019 and 5/22019. Also after the discontinuation of VGCV and bactrim and decreasing the dose of MMF.     On 5/24/2019, TTE showed decrease EF. MMF dose was increased though right heart Bx did not show rejection.   Presented with few days of dyspnea, cough, and subjective fever.   Found to have pseudomonal BSI and pneumonia.              Past Medical History:     Past Medical History:   Diagnosis Date     Alcohol abuse      Pierce's esophagus 10/4/2018     Chronic rhinitis 10/4/2018     Chronic systolic heart failure (H) 10/4/2018     Depression 10/4/2018     Diastolic dysfunction      DJD (degenerative joint disease) - neck 10/4/2018     H/O congenital atrial septal defect (ASD) repair at age 3 10/4/2018     Hypothyroidism due to medication 10/4/2018     ICD, [employer] 2008; gen change 2/2018 10/4/2018     Intermittent asthma without complication 10/4/2018     Nonischemic cardiomyopathy (H) 10/4/2018     On amiodarone therapy 10/4/2018     Paroxysmal atrial fibrillation (H) 10/4/2018     S/P ablation of atrial fibrillation 10/4/2018     Systolic heart failure (H)      Ulcerative colitis (H) 10/4/2018     Ventricular tachycardia (H) 10/4/2018             Past Surgical History:     Past Surgical History:   Procedure Laterality Date     AICD, DUAL  CHAMBER       BRONCHOSCOPY (RIGID OR FLEXIBLE), DIAGNOSTIC N/A 4/30/2019    Procedure: BRONCHOSCOPY, WITH BAL;  Surgeon: Margot Chiang MD;  Location:  GI     CV HEART BIOPSY N/A 3/4/2019    Procedure: Heart Biopsy;  Surgeon: Abhijeet Titus MD;  Location:  HEART CARDIAC CATH LAB     CV HEART BIOPSY N/A 3/25/2019    Procedure: Heart Biopsy;  Surgeon: Yves Rodrigues MD;  Location:  HEART CARDIAC CATH LAB     CV HEART BIOPSY N/A 3/28/2019    Procedure: Heart Cath Heart Biopsy;  Surgeon: Yves Rodrigues MD;  Location:  HEART CARDIAC CATH LAB     CV HEART BIOPSY N/A 4/10/2019    Procedure: CV HEART BIOPSY;  Surgeon: Isiah Mcallister MD;  Location:  HEART CARDIAC CATH LAB     CV HEART BIOPSY N/A 4/24/2019    Procedure: CV HEART BIOPSY;  Surgeon: Isiah Mcallister MD;  Location:  HEART CARDIAC CATH LAB     CV HEART BIOPSY N/A 5/8/2019    Procedure: CV HEART BIOPSY;  Surgeon: Isiah Mcallister MD;  Location:  HEART CARDIAC CATH LAB     CV INTRA-AORTIC BALLOON PUMP INSERTION N/A 2/18/2019    Procedure: Intra-Aortic Balloon;  Surgeon: Alton Solomon MD;  Location:  HEART CARDIAC CATH LAB     CV INTRA-AORTIC BALLOON PUMP INSERTION N/A 2/20/2019    Procedure: Replace subclavian IABP;  Surgeon: Yves Rodrigues MD;  Location:  HEART CARDIAC CATH LAB     CV LEFT HEART CATH N/A 3/19/2019    Procedure: Left Heart Cath;  Surgeon: Yves Rodrigues MD;  Location:  HEART CARDIAC CATH LAB     CV RIGHT HEART CATH N/A 3/19/2019    Procedure: RHC/HBx - Femoral access for 3/19 per Nimisha GONZALEZ;  Surgeon: Yves Rodrigues MD;  Location:  HEART CARDIAC CATH LAB     CV RIGHT HEART CATH N/A 3/25/2019    Procedure: Right Heart Cath;  Surgeon: Yves Rodrigues MD;  Location:  HEART CARDIAC CATH LAB     CV RIGHT HEART CATH N/A 3/28/2019    Procedure: Heart Cath Right Heart Cath;  Surgeon: Yves Rodrigues MD;  Location: [facility]ARD[facility]AC [facility]     CV RIGHT HEART CATH N/A  4/24/2019    Procedure: CV RIGHT HEART CATH;  Surgeon: Isiah Mcallister MD;  [facility]     CV RIGHT HEART CATH N/A 3/11/2019    Procedure: ADD ON RHC/HBX;  Surgeon: Alton Solomon MD;  Location:  HEART CARDIAC CATH LAB     INCISION AND DRAINAGE CHEST WASHOUT, COMBINED N/A 3/21/2019    Procedure: Incision And Drainage; Evacuation Right Chest Wall Hematoma;  Surgeon: Dewayne House MD;  Location: [facility] OR     INSERT INTRAAORTIC BALLOON PUMP Left 2/19/2019    Procedure: Insert left  Subclavian Balloon Pump,  Removal Right femoral arterial balloom pump sheath;  Surgeon: Dewayne House MD;  Location: [facility] OR     INSERT INTRAAORTIC BALLOON PUMP Left 2/21/2019    Procedure: SUBCLAVIAN BALLOON PUMP PLACEMENT;  Surgeon: Ben White MD;  Location: [facility] OR     IR PICC PLACEMENT > 5 YRS OF AGE  5/8/2019     TRANSPLANT HEART RECIPIENT N/A 2/24/2019    Procedure: TRANSPLANT HEART RECIPIENT;  Surgeon: Dewayne House MD;  Location: [facility] OR               Social History:     Social History     Tobacco Use     Smoking status: Former Smoker     Years: 10.00     Smokeless tobacco: Never Used   Substance Use Topics     Alcohol use: Yes     Alcohol/week: 0.6 oz     Types: 1 Cans of beer per week     Comment: a couple times a week              Family History:     Family History   Problem Relation Age of Onset     Endometrial Cancer Mother      Hypertension Father      Endometrial Cancer Maternal Grandmother              Immunizations:     Immunization History   Administered Date(s) Administered     Influenza (IIV3) PF 10/22/2008, 09/25/2009, 11/07/2011, 11/30/2012, 10/27/2013, 10/02/2014     Influenza Vaccine IM 3yrs+ 4 Valent IIV4 10/02/2015     Influenza Vaccine, 3 YRS +, IM (QUADRIVALENT W/PRESERVATIVES) 09/12/2016, 10/16/2017, 09/20/2018     OPV, trivalent, live 10/20/1978     Pneumo Conj 13-V (2010&after) 09/26/2018     TDAP Vaccine (Boostrix) 07/20/2012     Td (Adult),  Adsorbed 10/20/1978             Allergies:     Allergies   Allergen Reactions     Hydromorphone Other (See Comments)     Significant Delirium     Adhesive Tape Blisters     Tegaderm causes bruises, blisters and extreme irritation.     Lisinopril Other (See Comments)     hypotension     Quinolones Other (See Comments)     Would not rx quinolones until QTc interval improved.      Tobramycin Other (See Comments)     Would not use aminoglycosides as his kidney function is very borderline     Codeine Nausea             Medications:     Current Outpatient Medications   Medication Sig     acetaminophen (TYLENOL) 325 MG tablet Take 2 tablets (650 mg) by mouth every 4 hours as needed for mild pain     albuterol (PROAIR HFA/PROVENTIL HFA/VENTOLIN HFA) 108 (90 Base) MCG/ACT inhaler Inhale 2 puffs into the lungs every 6 hours as needed for shortness of breath / dyspnea or wheezing     amLODIPine (NORVASC) 5 MG tablet Take 1 tablet (5 mg) by mouth daily     aspirin (ASA) 81 MG chewable tablet Take 1 tablet (81 mg) by mouth daily     balsalazide (COLAZAL) 750 MG capsule TAKE 2 CAPSULES BY MOUTH TWICE DAILY     calcium carbonate (TUMS) 500 MG chewable tablet Take 1 tablet (500 mg) by mouth daily as needed for heartburn     calcium carbonate 600 mg-vitamin D 400 units (CALTRATE) 600-400 MG-UNIT per tablet Take 1 tablet by mouth 2 times daily (with meals)     ceFEPIme (MAXIPIME) 2 G vial Inject 2 g into the vein every 24 hours     DULoxetine (CYMBALTA) 20 MG EC capsule Take 20 mg by mouth daily     fluticasone (FLONASE) 50 MCG/ACT nasal spray Spray 1 spray into both nostrils 2 times daily     fluticasone (FLOVENT HFA) 220 MCG/ACT Inhaler Inhale 2 puffs into the lungs 2 times daily     levothyroxine (SYNTHROID/LEVOTHROID) 100 MCG tablet Take 100 mcg by mouth daily      magnesium oxide (MAG-OX) 400 MG tablet Take 1 tablet (400 mg) by mouth 2 times daily     melatonin 3 MG tablet Take 2 tablets (6 mg) by mouth At Bedtime     micafungin  "150 mg Inject 150 mg into the vein every 24 hours for 28 days     montelukast (SINGULAIR) 10 MG tablet Take 1 tablet (10 mg) by mouth At Bedtime     multivitamin w/minerals (THERA-VIT-M) tablet Take 1 tablet by mouth daily     mycophenolate (GENERIC EQUIVALENT) 250 MG capsule Take 6 capsules (1,500 mg) by mouth 2 times daily     nystatin (MYCOSTATIN) 640408 UNIT/ML suspension Take 5 mLs (500,000 Units) by mouth 4 times daily     omeprazole (PRILOSEC) 40 MG DR capsule Take 1 capsule (40 mg) by mouth daily     potassium chloride ER (K-DUR/KLOR-CON M) 20 MEQ CR tablet Take 1 tablet (20 mEq) by mouth daily     predniSONE (DELTASONE) 5 MG tablet Take 1 tablet (5 mg) by mouth daily     rosuvastatin (CRESTOR) 10 MG tablet Take 1 tablet (10 mg) by mouth daily     senna (SENOKOT) 8.6 MG tablet Take 2 tablets by mouth 2 times daily as needed for constipation     tacrolimus (GENERIC EQUIVALENT) 1 MG capsule Take 2 mg by mouth  in the morning and  3 mg  in the evening     No current facility-administered medications for this visit.      Facility-Administered Medications Ordered in Other Visits   Medication     heparin lock flush 10 UNIT/ML injection 5 mL            Review of Systems:   As mentioned in the interim history otherwise negative by reviewing constitutional symptoms, central and peripheral neurological systems, respiratory system, cardiac system, GI system,  system, musculoskeletal, skin, allergy, and lymphatics.                  Physical Exam:     Vitals:    05/28/19 1437   BP: 132/87   Pulse: 111   Temp: 98.4  F (36.9  C)   TempSrc: Oral   SpO2: 98%   Weight: 60.6 kg (133 lb 8 oz)   Height: 1.727 m (5' 8\")     Wt Readings from Last 4 Encounters:   05/28/19 60.6 kg (133 lb 8 oz)   05/26/19 57.2 kg (126 lb 1.6 oz)   05/24/19 59.6 kg (131 lb 6.4 oz)   05/15/19 55 kg (121 lb 4.8 oz)       Constitutional: awake, alert, cooperative, no apparent distress and appears at stated age, well nourished.   Head, ENT, Eyes, and " Neck: Normocephalic, sinuses non-tender to palpation, external ears without lesions, moist buccal mucosa without oral thrush, tonsils without swelling, erythema, or exudate, no tenderness palpating teeth, good dentition, gums without necrosis or abscesses.   PERRL, EOMI, pink conjunctivae, non-icteric sclera.   Neck supple without rigidity, no cervical/axillary/inguinal LA bilaterally.   Neurologic: Patient is moving all extremities without focal deficit, no focal sensory loss.   Lungs: CTA bilaterally, no accessory muscle use, no dullness to percussion and no abnormal tactile fremitus.   CVS: RRR, normal S1/S2, no murmur, PMI was not displaced.   Abdomen: non-tender, non-distended, no masses, no bruit, no shifting dullness, normal BS.   Extremities: no pitting edema of bilateral lower extremities, no ulcers, normal ROM of all joints, no swelling or erythema of any of joints and no tenderness to palpation.   Skin: no induration, fluctuation or discharge, and no rash but multiple bruises.             Laboratory Data:     No results found for: ACD4    Inflammatory Markers    Recent Labs   Lab Test 05/15/19  0940 05/02/19  0536 04/29/19  0911 11/15/18  0955 10/22/18  1239   SED  --  37*  --   --   --    CRP <2.9 69.0* 120.0* 6.1 12.5       Immune Globulin Studies      Recent Labs   Lab Test 05/01/19  1850   *   IGM 29*   IGE 38          Metabolic Studies    Recent Labs   Lab Test 05/26/19  1401 05/26/19  0601 05/25/19  1626 05/25/19  0838 05/25/19  0006 05/24/19  1655 05/22/19  0926 05/15/19  0940  04/29/19  0918 04/29/19  0911  02/23/19  1835    138 138 139 136 134 138 132*   < >  --  131*   < > 133   POTASSIUM 3.7 3.6 4.0 4.0 3.9 4.1 3.7 5.0   < >  --  3.5   < > 3.6   CHLORIDE 106 108 106 106 104 102 102 100   < >  --  94   < > 93*   CO2 21 21 19* 21 22 21 25 24   < >  --  24   < > 30   ANIONGAP 11 9 12 11 10 11 10 8   < >  --  12   < > 10   BUN 55* 56* 62* 60* 65* 63* 53* 58*   < >  --  23   < >  28   CR 3.32* 3.44* 3.45* 3.60* 3.63* 3.43* 2.43* 2.17*   < >  --  1.04   < > 1.03   GFRESTIMATED 20* 19* 19* 18* 18* 19* 29* 33*   < >  --  80   < > 81   * 72 264* 75 101* 164* 82 82   < >  --  79   < > 114*   A1C  --   --   --   --   --   --   --   --   --   --   --   --  5.7*   RADAMES 8.9 9.3 8.4* 9.2 9.1 9.2 9.3 10.1   < >  --  9.0   < > 8.9   PHOS  --   --   --   --   --   --  4.2 3.8  --   --   --    < > 3.6   MAG  --   --   --   --   --   --  1.9 1.8   < >  --   --    < > 2.0   LACT  --   --   --   --   --   --   --   --   --  1.7 1.6   < >  --    CKT  --   --   --   --   --   --  26*  --   --   --   --   --   --     < > = values in this interval not displayed.       Hepatic Studies    Recent Labs   Lab Test 05/25/19  0006 05/24/19  1655 05/22/19  0926 05/15/19  0940 04/29/19  0911 04/04/19  0840  03/14/19  1653  03/10/19  0410   BILITOTAL 0.3 0.4 0.4 0.4 1.0 0.6   < >  --    < > 0.8   ALKPHOS 91 113 109 127 125 139   < >  --    < > 104   PROTTOTAL 6.5* 7.2 7.3 7.6 7.1 5.8*   < >  --    < > 5.0*   ALBUMIN 3.1* 3.2* 3.5 3.7 3.5 2.9*   < >  --    < > 2.8*   AST 16 13 15 22 10 16   < >  --    < > 22   ALT 15 18 20 22 17 26   < >  --    < > 19   LDH  --   --   --   --   --   --   --  320*  --  276*    < > = values in this interval not displayed.       Hematology Studies     Recent Labs   Lab Test 05/26/19  0601 05/25/19  0006 05/24/19  1655 05/22/19  0926 05/15/19  0940 05/08/19  0504 05/07/19  0451 05/06/19  0438   WBC 8.5 8.1 8.9 9.0 9.6 6.3 6.6 7.4   ANEU  --   --  7.6 6.7 6.8 4.4 4.2 4.8   ALYM  --   --  0.8 1.3 2.0 1.0 1.4 1.4   GRACE  --   --  0.2 0.3 0.7 0.7 0.9 0.7   AEOS  --   --  0.1 0.2 0.1 0.1 0.0 0.0   HGB 8.6* 8.2* 9.0* 9.7* 11.0* 9.0* 8.4* 8.3*   HCT 28.0* 27.3* 28.1* 32.0* 35.1* 28.7* 28.1* 27.2*    166 191 269 376 372 336 323       Clotting Studies    Recent Labs   Lab Test 05/08/19  0504 05/07/19  0451 05/06/19  0438 05/05/19  0720  03/14/19  1653  02/27/19  0421 02/25/19  1502  02/24/19  1817   INR 1.22* 1.33* 1.31* 1.33*   < > 1.36*   < >  --  1.29* 1.50*   PTT  --   --   --   --   --  46*  --  38* 32 55*    < > = values in this interval not displayed.       Urine Studies    Recent Labs   Lab Test 05/26/19  1006 04/29/19  1031 03/26/19  1707 03/17/19  0045 03/04/19  1303   URINEPH 6.5 6.0 5.5 5.5 5.0   NITRITE Negative Negative Negative Negative Negative   LEUKEST Negative Negative Negative Negative Negative   WBCU 0 <1 2 15* 0         Microbiology:  Last 6 Culture results with specimen source  Culture Micro   Date Value Ref Range Status   05/02/2019 No growth  Final   05/02/2019 No growth  Final   05/02/2019 (A)  Final    Canceled, Test credited  >10 Squamous epithelial cells/low power field indicates oral contamination. Please   recollect.     05/01/2019 No growth  Final   05/01/2019 No growth  Final   04/30/2019 (A)  Final    Canceled, Test credited  >10 Squamous epithelial cells/low power field indicates oral contamination. Please   recollect.     04/30/2019   Final    Notification of test cancellation was given to  ASHLIE SCOTT RN [id number] 4.30.19 [facility]      Specimen Description   Date Value Ref Range Status   05/02/2019 Blood Right Hand  Final   05/02/2019 Blood Right Hand  Final   05/02/2019 Sputum  Final   05/02/2019 Sputum  Final   05/01/2019 Blood Left Arm  Final   05/01/2019 Blood Right Hand  Final        Last check of C difficile  C Diff Toxin B PCR   Date Value Ref Range Status   04/29/2019 Negative NEG^Negative Final     Comment:     Negative: Clostridium difficile target DNA sequences NOT detected, presumed   negative for Clostridium difficile toxin B or the number of bacteria present   may be below the limit of detection for the test.  FDA approved assay performed using [employer] GeneXpert real-time PCR.  A negative result does not exclude actual disease due to Clostridium difficile   and may be due to improper collection, handling and storage of the specimen   or the number  of organisms in the specimen is below the detection limit of the   assay.           Virology:  CMV viral loads    CMV viral loads    Recent Labs   Lab Test 05/24/19  1648 05/22/19  0926   CSPEC Plasma, EDTA anticoagulant EDTA PLASMA   CMVLOG Not Calculated Not Calculated       CMV viral loads    Log IU/mL of CMVQNT   Date Value Ref Range Status   05/24/2019 Not Calculated <2.1 [Log_IU]/mL Final   05/22/2019 Not Calculated <2.1 [Log_IU]/mL Final   05/15/2019 Not Calculated <2.1 [Log_IU]/mL Final   05/06/2019 Not Calculated <2.1 [Log_IU]/mL Final   04/30/2019 Not Calculated <2.1 [Log_IU]/mL Final   04/29/2019 Not Calculated <2.1 [Log_IU]/mL Final   04/26/2019 Not Calculated <2.1 [Log_IU]/mL Final   04/24/2019 Not Calculated <2.1 [Log_IU]/mL Final   04/17/2019 Not Calculated <2.1 [Log_IU]/mL Final   04/08/2019 Not Calculated <2.1 [Log_IU]/mL Final   04/01/2019 Not Calculated <2.1 [Log_IU]/mL Final   03/25/2019 Not Calculated <2.1 [Log_IU]/mL Final   03/18/2019 Not Calculated <2.1 [Log_IU]/mL Final       CMV resistance testing  No lab results found.  No results found for: CMVCID, CMVFOS, CMVGAN     EBV viral loads     Recent Labs   Lab Test 05/22/19  0926 04/29/19  0911 04/26/19  0848 03/27/19  0521   EBRES 541* EBV DNA Not Detected EBV DNA Not Detected EBV DNA Not Detected     EBV DNA Copies/mL   Date Value Ref Range Status   05/22/2019 541 (A) EBVNEG^EBV DNA Not Detected [Copies]/mL Final   04/29/2019 EBV DNA Not Detected EBVNEG^EBV DNA Not Detected [Copies]/mL Final   04/26/2019 EBV DNA Not Detected EBVNEG^EBV DNA Not Detected [Copies]/mL Final   03/27/2019 EBV DNA Not Detected EBVNEG^EBV DNA Not Detected [Copies]/mL Final       Human Herpes Virus 6 viral loads    No results found for: H6RES No results found for: H6SPEC    CMV Antibody IgG   Date Value Ref Range Status   02/23/2019 >8.0 (H) 0.0 - 0.8 AI Final     Comment:     Positive  Antibody index (AI) values reflect qualitative changes in antibody   concentration  that cannot be directly associated with clinical condition or   disease state.     02/15/2019 >8.0 (H) 0.0 - 0.8 AI Final     Comment:     Positive  Antibody index (AI) values reflect qualitative changes in antibody   concentration that cannot be directly associated with clinical condition or   disease state.       Toxoplasma Antibody IgG   Date Value Ref Range Status   02/22/2019 <3.0 0.0 - 7.1 IU/mL Final     Comment:     Negative- Absence of detectable Toxoplasma gondii IgG antibodies. A negative   result does not rule out acute infection.  The magnitude of the measured result is not indicative of the amount of   antibody present. The concentrations of anti-Toxoplasma gondii IgG in a given   specimen determined with assays from different manufacturers can vary due to   differences in assay methods and reagent specificity.         Pathology:  Heart bx 5/22/2019  FINAL DIAGNOSIS:   Heart, allograft, right ventricle, endomyocardial biopsy:   - Acute cellular rejection: Focal mild rejection, Grade 1R/1A (ISHLT 2004)        - Antibody-mediated rejection: No evidence of antibody-mediated   rejection        - C3d and C4d are negative (see comment)     BAL 4/30/2019   Lung, lingula, bronchoalveolar lavage:    -Negative for malignancy    -No fungal or Pneumocystis organisms are identified on GMS stained   preparations.    -Viral cytopathic effect is absent.   Specimen Adequacy: Satisfactory for evaluation.       Imaging:  CT chest 5/22/2019 reviewed by myself.   IMPRESSION:   1. Significantly decreased bilateral nodular and consolidative  opacities from 4/29/2019. No new or worsening pulmonary opacities.  These findings are likely representative of a improving infection.  2. Stable subacute T12 vertebral body compression deformity.    Results for orders placed or performed during the hospital encounter of 05/24/19   US Renal Complete    Narrative    EXAMINATION: US RENAL COMPLETE, 5/25/2019 12:30 PM     COMPARISON:  3/26/2018    HISTORY: [facility]-patient post heart transplant     FINDINGS:    Right kidney: Measures 12.3 cm in length. Parenchyma is of normal  thickness. Mildly increased cortical echogenicity. No focal mass. No  hydronephrosis.    Left kidney: Measures 10.4 cm in length. Parenchyma is of normal  thickness. Mildly increased cortical echogenicity. No focal mass. No  hydronephrosis.     Bladder: Unremarkable.      Impression    IMPRESSION:  Mildly increased cortical echogenicity bilaterally, suggestive of  medical renal disease. No hydronephrosis.    I have personally reviewed the examination and initial interpretation  and I agree with the findings.    RUFUS CORDERO MD       Cardiology   TTE 5/25/2019  Interpretation Summary  Moderate concentric wall thickening consistent with left ventricular  hypertrophy is present.  Mildly (EF 40-45%) reduced left ventricular function is present.  Mild right ventricular dilation and right ventricular function is mildly  reduced.  Moderate mitral insufficiency.  Mild to moderate tricuspid insufficiency.  Moderate pulmonary hypertension with dilated IVC.     This study was compared with the study from 5/22/19 and MR is more prominent.    Ric Shukla MD

## 2019-05-29 ENCOUNTER — OFFICE VISIT - HEALTHEAST (OUTPATIENT)
Dept: INTERNAL MEDICINE | Facility: CLINIC | Age: 54
End: 2019-05-29

## 2019-05-29 ENCOUNTER — RECORDS - HEALTHEAST (OUTPATIENT)
Dept: ADMINISTRATIVE | Facility: OTHER | Age: 54
End: 2019-05-29

## 2019-05-29 ENCOUNTER — TELEPHONE (OUTPATIENT)
Dept: INFECTIOUS DISEASES | Facility: CLINIC | Age: 54
End: 2019-05-29

## 2019-05-29 ENCOUNTER — INFUSION - HEALTHEAST (OUTPATIENT)
Dept: INFUSION THERAPY | Facility: CLINIC | Age: 54
End: 2019-05-29

## 2019-05-29 DIAGNOSIS — K51.90 ULCERATIVE COLITIS WITHOUT COMPLICATIONS, UNSPECIFIED LOCATION (H): ICD-10-CM

## 2019-05-29 DIAGNOSIS — E03.9 HYPOTHYROIDISM, UNSPECIFIED TYPE: ICD-10-CM

## 2019-05-29 DIAGNOSIS — Z86.79 HISTORY OF VENTRICULAR TACHYCARDIA: ICD-10-CM

## 2019-05-29 DIAGNOSIS — Z94.1 HEART REPLACED BY TRANSPLANT (H): ICD-10-CM

## 2019-05-29 DIAGNOSIS — R91.8 PULMONARY NODULES: ICD-10-CM

## 2019-05-29 DIAGNOSIS — F34.1 DYSTHYMIA: ICD-10-CM

## 2019-05-29 DIAGNOSIS — Z87.01 HISTORY OF PSEUDOMONAS PNEUMONIA: ICD-10-CM

## 2019-05-29 DIAGNOSIS — A41.52 SEPSIS DUE TO PSEUDOMONAS AERUGINOSA (H): ICD-10-CM

## 2019-05-29 DIAGNOSIS — J45.20 MILD INTERMITTENT ASTHMA WITHOUT COMPLICATION: ICD-10-CM

## 2019-05-29 LAB
TACROLIMUS BLD-MCNC: 11.4 UG/L (ref 5–15)
TME LAST DOSE: NORMAL H

## 2019-05-29 NOTE — TELEPHONE ENCOUNTER
[facility] Call Center    Phone Message    May a detailed message be left on voicemail: yes, Lesa states wanting clarification of the Duration of the medication. And if needing to fax new orders to fax them to fax [phone]. Also Lesa can be reached at her confidential line at [phone]    Reason for Call: Medication Question or concern regarding medication   Prescription Clarification  Name of Medication: Cefeprime 2 grams IV Daily  Prescribing Provider: Ric Shukla   Pharmacy: N/A   What on the order needs clarification? Per Lesa is wanting to get clarification on if Pt is needing to stop medication or to continue on medication. Lesa states the Pt stated he is needing to stop medication. However, Lesa states she doesn't see an order stating that and the medication states to continue till 6/4/209. Lesa is wanting confirmation on what is needing to be done with medication for Pt.           Action Taken: Message routed to:  [facility] [[email or web address]] INFECTIOUS DISEASE ADULT [facility]

## 2019-05-30 ENCOUNTER — TELEPHONE (OUTPATIENT)
Dept: TRANSPLANT | Facility: CLINIC | Age: 54
End: 2019-05-30

## 2019-05-30 ENCOUNTER — INFUSION - HEALTHEAST (OUTPATIENT)
Dept: INFUSION THERAPY | Facility: HOSPITAL | Age: 54
End: 2019-05-30

## 2019-05-30 DIAGNOSIS — R91.8 PULMONARY NODULES: ICD-10-CM

## 2019-05-30 DIAGNOSIS — Z94.1 TRANSPLANTED HEART (H): Primary | ICD-10-CM

## 2019-05-30 DIAGNOSIS — A41.52 SEPSIS DUE TO PSEUDOMONAS AERUGINOSA (H): ICD-10-CM

## 2019-05-30 NOTE — TELEPHONE ENCOUNTER
Per Dr. Shukla: Discontinue cefepime after last dose on 5/28/19 and Discontinue micafungin after last dose 6/3/2019 and discontinue the PICC line. Called Lesa at [facility] and let her know. Faxed order to fax number below as well.  Nazia Son RN

## 2019-05-30 NOTE — TELEPHONE ENCOUNTER
Pt called back to relay orders for lab work to be completed on 5/31 (orders in) and for heart biopsy, rhc, and echo to be completed early next week. Message sent to schedulers. No answer. VM left.

## 2019-05-30 NOTE — TELEPHONE ENCOUNTER
tacro level 11.4. Goal 8-10. Good 12 hour trough per patient. Current dose 2/3. Pt to decrease to tacro 2/2.(pt refuses to take 0.5 mg tablets right now).  Repeat a level in 1 week.     Mg level 1.5. Pt continues to take Mag ox 400 mg bid. No diarrhea noted. Pt states he will be better about taking it on a regular basis.       Post discharge hospital follow up. Pt states he is feeling better. He is increasing his fluids, drinks atleast 2 liters a day. Bumex on hold. Pt has antifungal infusion appt on Monday 6/2. Possible picc line discontinuation after infusion. Repeat tacro level on 6/6 or 6/7. Still taking pred 5 mg once a day. Echo completed while inpatient. Message sent to Dr. Donis regarding discontinuing prednisone. Pt inquiring about starting back at [employer] as a  1-2 shifts a week. Also, wants to know if he can reschedule all or some of appointments on 7/17 due to family coming to town. RN coordinator will see what our options are and will get back to patient.

## 2019-05-31 ENCOUNTER — TELEPHONE (OUTPATIENT)
Dept: TRANSPLANT | Facility: CLINIC | Age: 54
End: 2019-05-31

## 2019-05-31 ENCOUNTER — INFUSION - HEALTHEAST (OUTPATIENT)
Dept: INFUSION THERAPY | Facility: HOSPITAL | Age: 54
End: 2019-05-31

## 2019-05-31 DIAGNOSIS — A41.52 SEPSIS DUE TO PSEUDOMONAS AERUGINOSA (H): ICD-10-CM

## 2019-05-31 DIAGNOSIS — R91.8 PULMONARY NODULES: ICD-10-CM

## 2019-05-31 NOTE — TELEPHONE ENCOUNTER
RN coordinator connected with patient. Pt instructed to get tacro and bmp drawn tomorrow morning 6/1, because he didn't get my messages until 930 this am. 12 hour trough discussed and I stressed the importance of getting an accurate trough so we can have tacro dose within therapeutic range to help protect the kidneys. In process of scheduling testing, pt verbalized an understanding of plan of care. Will update on call coordinator to watch for tacro results.

## 2019-05-31 NOTE — TELEPHONE ENCOUNTER
Pt called and communicated the importance of lab draw Saturday morning. I also reviewed appointment time for Tuesday. Pt agreed with plan.

## 2019-06-01 ENCOUNTER — INFUSION - HEALTHEAST (OUTPATIENT)
Dept: INFUSION THERAPY | Facility: CLINIC | Age: 54
End: 2019-06-01

## 2019-06-01 ENCOUNTER — TELEPHONE (OUTPATIENT)
Dept: TRANSPLANT | Facility: CLINIC | Age: 54
End: 2019-06-01

## 2019-06-01 DIAGNOSIS — R91.8 PULMONARY NODULES: ICD-10-CM

## 2019-06-01 DIAGNOSIS — A41.52 SEPSIS DUE TO PSEUDOMONAS AERUGINOSA (H): ICD-10-CM

## 2019-06-01 DIAGNOSIS — Z94.1 TRANSPLANTED HEART (H): Primary | ICD-10-CM

## 2019-06-01 RX ORDER — LIDOCAINE 40 MG/G
CREAM TOPICAL
Status: CANCELLED | OUTPATIENT
Start: 2019-06-01

## 2019-06-02 ENCOUNTER — TELEPHONE (OUTPATIENT)
Dept: TRANSPLANT | Facility: CLINIC | Age: 54
End: 2019-06-02

## 2019-06-02 ENCOUNTER — INFUSION - HEALTHEAST (OUTPATIENT)
Dept: INFUSION THERAPY | Facility: CLINIC | Age: 54
End: 2019-06-02

## 2019-06-02 DIAGNOSIS — R91.8 PULMONARY NODULES: ICD-10-CM

## 2019-06-02 DIAGNOSIS — A41.52 SEPSIS DUE TO PSEUDOMONAS AERUGINOSA (H): ICD-10-CM

## 2019-06-02 DIAGNOSIS — Z94.1 HEART REPLACED BY TRANSPLANT (H): ICD-10-CM

## 2019-06-02 PROCEDURE — 80197 ASSAY OF TACROLIMUS: CPT | Performed by: INTERNAL MEDICINE

## 2019-06-02 NOTE — TELEPHONE ENCOUNTER
Call placed to patient to review current tacro level drawn today. Patient not available at time of call. Left VM requesting a call back. Awaiting call.

## 2019-06-02 NOTE — TELEPHONE ENCOUNTER
Patient reported he did not get his tacrolimus drawn as requested by his coordinator on Saturday. He was going to his local lab at [facility] today to have it drawn and [facility] to [facility]. Will notify transplant coordinator.

## 2019-06-03 ENCOUNTER — AMBULATORY - HEALTHEAST (OUTPATIENT)
Dept: CARDIAC REHAB | Facility: HOSPITAL | Age: 54
End: 2019-06-03

## 2019-06-03 ENCOUNTER — TELEPHONE (OUTPATIENT)
Dept: TRANSPLANT | Facility: CLINIC | Age: 54
End: 2019-06-03

## 2019-06-03 ENCOUNTER — INFUSION - HEALTHEAST (OUTPATIENT)
Dept: INFUSION THERAPY | Facility: HOSPITAL | Age: 54
End: 2019-06-03

## 2019-06-03 DIAGNOSIS — Z94.1 TRANSPLANTED HEART (H): Primary | ICD-10-CM

## 2019-06-03 DIAGNOSIS — R91.8 PULMONARY NODULES: ICD-10-CM

## 2019-06-03 DIAGNOSIS — Z94.1 STATUS POST HEART TRANSPLANTATION (H): ICD-10-CM

## 2019-06-03 DIAGNOSIS — A41.52 SEPSIS DUE TO PSEUDOMONAS AERUGINOSA (H): ICD-10-CM

## 2019-06-03 LAB — LAB SCANNED RESULT: NORMAL

## 2019-06-04 ENCOUNTER — HOSPITAL ENCOUNTER (OUTPATIENT)
Facility: CLINIC | Age: 54
Discharge: HOME | End: 2019-06-04
Attending: INTERNAL MEDICINE | Admitting: INTERNAL MEDICINE
Payer: COMMERCIAL

## 2019-06-04 ENCOUNTER — APPOINTMENT (OUTPATIENT)
Dept: MEDSURG UNIT | Facility: CLINIC | Age: 54
End: 2019-06-04
Attending: INTERNAL MEDICINE
Payer: COMMERCIAL

## 2019-06-04 ENCOUNTER — HOSPITAL ENCOUNTER (OUTPATIENT)
Dept: CARDIOLOGY | Facility: CLINIC | Age: 54
Discharge: HOME | End: 2019-06-04
Attending: INTERNAL MEDICINE | Admitting: INTERNAL MEDICINE
Payer: COMMERCIAL

## 2019-06-04 ENCOUNTER — TELEPHONE (OUTPATIENT)
Dept: TRANSPLANT | Facility: CLINIC | Age: 54
End: 2019-06-04

## 2019-06-04 ENCOUNTER — APPOINTMENT (OUTPATIENT)
Dept: LAB | Facility: CLINIC | Age: 54
End: 2019-06-04
Attending: INTERNAL MEDICINE
Payer: COMMERCIAL

## 2019-06-04 VITALS
RESPIRATION RATE: 18 BRPM | BODY MASS INDEX: 18.64 KG/M2 | DIASTOLIC BLOOD PRESSURE: 98 MMHG | TEMPERATURE: 97.4 F | HEART RATE: 113 BPM | HEIGHT: 68 IN | SYSTOLIC BLOOD PRESSURE: 142 MMHG | WEIGHT: 123 LBS | OXYGEN SATURATION: 99 %

## 2019-06-04 DIAGNOSIS — Z94.1 TRANSPLANTED HEART (H): Primary | ICD-10-CM

## 2019-06-04 DIAGNOSIS — Z94.1 TRANSPLANTED HEART (H): ICD-10-CM

## 2019-06-04 LAB
ANION GAP SERPL CALCULATED.3IONS-SCNC: 10 MMOL/L (ref 3–14)
BUN SERPL-MCNC: 42 MG/DL (ref 7–30)
CALCIUM SERPL-MCNC: 9.2 MG/DL (ref 8.5–10.1)
CHLORIDE SERPL-SCNC: 105 MMOL/L (ref 94–109)
CO2 SERPL-SCNC: 23 MMOL/L (ref 20–32)
CREAT SERPL-MCNC: 2.38 MG/DL (ref 0.66–1.25)
ERYTHROCYTE [DISTWIDTH] IN BLOOD BY AUTOMATED COUNT: 17.2 % (ref 10–15)
GFR SERPL CREATININE-BSD FRML MDRD: 29 ML/MIN/{1.73_M2}
GLUCOSE SERPL-MCNC: 81 MG/DL (ref 70–99)
HCT VFR BLD AUTO: 32.1 % (ref 40–53)
HGB BLD-MCNC: 9.6 G/DL (ref 13.3–17.7)
MCH RBC QN AUTO: 29.5 PG (ref 26.5–33)
MCHC RBC AUTO-ENTMCNC: 29.9 G/DL (ref 31.5–36.5)
MCV RBC AUTO: 99 FL (ref 78–100)
PLATELET # BLD AUTO: 261 10E9/L (ref 150–450)
POTASSIUM SERPL-SCNC: 3.7 MMOL/L (ref 3.4–5.3)
RBC # BLD AUTO: 3.25 10E12/L (ref 4.4–5.9)
SODIUM SERPL-SCNC: 138 MMOL/L (ref 133–144)
TACROLIMUS BLD-MCNC: 4.7 UG/L (ref 5–15)
TACROLIMUS BLD-MCNC: 5.9 UG/L (ref 5–15)
TME LAST DOSE: ABNORMAL H
TME LAST DOSE: NORMAL H
WBC # BLD AUTO: 8.4 10E9/L (ref 4–11)

## 2019-06-04 PROCEDURE — 93451 RIGHT HEART CATH: CPT | Performed by: INTERNAL MEDICINE

## 2019-06-04 PROCEDURE — 80197 ASSAY OF TACROLIMUS: CPT | Performed by: INTERNAL MEDICINE

## 2019-06-04 PROCEDURE — 27210794 ZZH OR GENERAL SUPPLY STERILE: Performed by: INTERNAL MEDICINE

## 2019-06-04 PROCEDURE — 80048 BASIC METABOLIC PNL TOTAL CA: CPT | Performed by: INTERNAL MEDICINE

## 2019-06-04 PROCEDURE — 40000166 ZZH STATISTIC PP CARE STAGE 1

## 2019-06-04 PROCEDURE — 85027 COMPLETE CBC AUTOMATED: CPT | Performed by: INTERNAL MEDICINE

## 2019-06-04 PROCEDURE — 25000125 ZZHC RX 250: Performed by: INTERNAL MEDICINE

## 2019-06-04 PROCEDURE — 93306 TTE W/DOPPLER COMPLETE: CPT | Mod: 26 | Performed by: INTERNAL MEDICINE

## 2019-06-04 PROCEDURE — 93505 ENDOMYOCARDIAL BIOPSY: CPT | Performed by: INTERNAL MEDICINE

## 2019-06-04 PROCEDURE — 93505 ENDOMYOCARDIAL BIOPSY: CPT | Mod: 26 | Performed by: INTERNAL MEDICINE

## 2019-06-04 PROCEDURE — C1894 INTRO/SHEATH, NON-LASER: HCPCS | Performed by: INTERNAL MEDICINE

## 2019-06-04 PROCEDURE — 40000264 ECHOCARDIOGRAM COMPLETE

## 2019-06-04 PROCEDURE — 88350 IMFLUOR EA ADDL 1ANTB STN PX: CPT | Performed by: INTERNAL MEDICINE

## 2019-06-04 PROCEDURE — 36415 COLL VENOUS BLD VENIPUNCTURE: CPT | Performed by: INTERNAL MEDICINE

## 2019-06-04 PROCEDURE — 25500064 ZZH RX 255 OP 636: Performed by: INTERNAL MEDICINE

## 2019-06-04 PROCEDURE — 88346 IMFLUOR 1ST 1ANTB STAIN PX: CPT | Performed by: INTERNAL MEDICINE

## 2019-06-04 PROCEDURE — 88307 TISSUE EXAM BY PATHOLOGIST: CPT | Performed by: INTERNAL MEDICINE

## 2019-06-04 RX ORDER — TACROLIMUS 1 MG/1
CAPSULE ORAL
Qty: 360 CAPSULE | Refills: 3 | Status: SHIPPED | OUTPATIENT
Start: 2019-06-04 | End: 2019-07-18

## 2019-06-04 RX ORDER — LIDOCAINE 40 MG/G
CREAM TOPICAL
Status: COMPLETED | OUTPATIENT
Start: 2019-06-04 | End: 2019-06-04

## 2019-06-04 RX ADMIN — LIDOCAINE: 40 CREAM TOPICAL at 09:29

## 2019-06-04 RX ADMIN — HUMAN ALBUMIN MICROSPHERES AND PERFLUTREN 6 ML: 10; .22 INJECTION, SOLUTION INTRAVENOUS at 08:45

## 2019-06-04 ASSESSMENT — MIFFLIN-ST. JEOR: SCORE: 1362.42

## 2019-06-04 NOTE — DISCHARGE INSTRUCTIONS
[facility]                        Interventional Cardiology  Discharge Instructions   Post Right Heart Cath      AFTER YOU GO HOME:    DO drink plenty of fluids    DO resume your regular diet and medications unless otherwise instructed by your Primary Physician    Do Not scrub the procedure site vigorously    No lotion or powder to the puncture site for 3 days    CALL YOUR PRIMARY PHYSICIAN IF: You may resume all normal activity.  Monitor neck site for bleeding, swelling, or voice changes. If you notice bleeding or swelling immediately apply pressure to the site and call number below to speak with Cardiology Fellow.  If you experience any changes in your breathing you should call your doctor immediately or come to the closest Emergency Department.  Do not drive yourself.    ADDITIONAL INSTRUCTIONS: Medications: You are to resume all home medications including anticoagulation therapy unless otherwise advised by your primary cardiologist or nurse coordinator.    Follow Up: Per your primary cardiology team    If you have any questions or concerns regarding your procedure site please call [phone] at anytime and ask for Cardiology Fellow on call.  They are available 24 hours a day.  You may also contact the Cardiology Clinic after hours number at [phone].                                                       Telephone Numbers [phone] Monday-Friday 8:00 am to 4:30 pm    [phone] After 4:30 pm Monday-Friday, Weekends & Holidays  Ask for Interventional Cardiologist on call. Someone is on call 24 hours/day   [facility] toll free number [phone] Monday-Friday 8:00 am to 4:30 pm   [facility] Emergency Dept [phone]

## 2019-06-04 NOTE — PROGRESS NOTES
Pt here post right heart cath; pt awake and alert, denies pain; right neck site is flat, dry and intact. Pt confirms he received his discharge papers; taking water without problem. Pt ready for discharge; will discharge to self care when ready.

## 2019-06-04 NOTE — PROGRESS NOTES
[facility]   Interventional Cardiology        Consenting/Education for Cardiac Cath Lab Procedure: Right Heart Catheterization and Cardiac Biopsy     Patient understands we would like to perform .Right Heart Catheterization and Cardiac Biopsy due to heart transplant monitoring. This procedure will be performed by Dr. Mckeon.    The patient understands the following:     Right Heart Catheterization: A fine tube (catheter) is put into the vein of the groin/neck.  It is carefully passed along until it reaches the heart and then goes up into the blood vessels of the lungs. This is done to measure a variety of pressures in your heart and can tell us how well the heart is filling and emptying, as well as monitor fluid status.  and Endomyocardial Biopsy: A catheter is placed in your neck/groin vein, a 'Biopsy forceps' or pincers at the end of the catheter are used to take the tissue samples. This is may be repeated to get enough samples. The biopsy pieces are very small (one to two millimeters).     No sedation is planned for this procedure. Patient also understands risks and complications of the procedure which include, but are not limited to bruising/swelling around the incision site, infection, bleeding, allergic reaction to local anesthetic, air embolism, arterial puncture, stroke, heart attack.       Patient verbalized understanding of risks and benefits and has elected to proceed with the procedure or procedures listed above.    Elizabeth Greco PA-C  [facility] Interventional Cardiology  [phone]

## 2019-06-04 NOTE — TELEPHONE ENCOUNTER
tacro level 4.7. Pt states he accidentally only took one pill. Inaccurate tacro level. RN had an in depth conversation about the importance of follow up. We have tried numerous time to get a tacro level and patient has continued to have issues with showing up to appointments, following a 12 hour trough, returning phone calls, and being compliant with taking his medications. RN explained why we are trying to get his labs in therapeutic range and explained the consequences of not. Pt verbalized an understanding and stated moving forward he will try harder.     Plan: recheck tacro level on 6/6. 12 hour trough discussed.     Echo results communicated, pt verbalized an understanding.     RHC and hbx pending.

## 2019-06-04 NOTE — PROGRESS NOTES
Midline removed right basilic. Difficult to take out due to pain from anchored hook. Site intact, pressure held after removal x 5 minutes. Exit site dressed with 2 x 2 and  And co-ban

## 2019-06-04 NOTE — IP AVS SNAPSHOT
[address] [address]                                    After Visit Summary   6/4/2019    Everton Larios    MRN: [id number]           After Visit Summary Signature Page    I have received my discharge instructions, and my questions have been answered. I have discussed any challenges I see with this plan with the nurse or doctor.    ..........................................................................................................................................  Patient/Patient Representative Signature      ..........................................................................................................................................  Patient Representative Print Name and Relationship to Patient    ..................................................               ................................................  Date                                   Time    ..........................................................................................................................................  Reviewed by Signature/Title    ...................................................              ..............................................  Date                                               Time          22EPIC Rev 08/18

## 2019-06-04 NOTE — PROGRESS NOTES
Patient prepped for RH/BX.  Denies pain.  No one with him today.  Consent current.  Labs pending.  H & P current.

## 2019-06-06 ENCOUNTER — ALLIED HEALTH/NURSE VISIT (OUTPATIENT)
Dept: PHARMACY | Facility: CLINIC | Age: 54
End: 2019-06-06
Payer: COMMERCIAL

## 2019-06-06 DIAGNOSIS — E78.2 MIXED HYPERLIPIDEMIA: ICD-10-CM

## 2019-06-06 DIAGNOSIS — K59.00 CONSTIPATION, UNSPECIFIED CONSTIPATION TYPE: ICD-10-CM

## 2019-06-06 DIAGNOSIS — M54.9 ACUTE BACK PAIN, UNSPECIFIED BACK LOCATION, UNSPECIFIED BACK PAIN LATERALITY: ICD-10-CM

## 2019-06-06 DIAGNOSIS — G47.00 INSOMNIA, UNSPECIFIED TYPE: ICD-10-CM

## 2019-06-06 DIAGNOSIS — J30.1 ALLERGIC RHINITIS DUE TO POLLEN, UNSPECIFIED SEASONALITY: ICD-10-CM

## 2019-06-06 DIAGNOSIS — K51.919 ULCERATIVE COLITIS WITH COMPLICATION, UNSPECIFIED LOCATION (H): ICD-10-CM

## 2019-06-06 DIAGNOSIS — Z94.1 HEART REPLACED BY TRANSPLANT (H): Primary | ICD-10-CM

## 2019-06-06 DIAGNOSIS — Z94.1 HEART REPLACED BY TRANSPLANT (H): ICD-10-CM

## 2019-06-06 DIAGNOSIS — J45.909 ASTHMA, UNSPECIFIED ASTHMA SEVERITY, UNSPECIFIED WHETHER COMPLICATED, UNSPECIFIED WHETHER PERSISTENT: ICD-10-CM

## 2019-06-06 DIAGNOSIS — K21.00 GASTROESOPHAGEAL REFLUX DISEASE WITH ESOPHAGITIS: ICD-10-CM

## 2019-06-06 DIAGNOSIS — I10 ESSENTIAL HYPERTENSION: ICD-10-CM

## 2019-06-06 PROCEDURE — 80197 ASSAY OF TACROLIMUS: CPT | Performed by: INTERNAL MEDICINE

## 2019-06-06 PROCEDURE — 99606 MTMS BY PHARM EST 15 MIN: CPT | Performed by: PHARMACIST

## 2019-06-06 PROCEDURE — 36415 COLL VENOUS BLD VENIPUNCTURE: CPT | Performed by: INTERNAL MEDICINE

## 2019-06-06 PROCEDURE — 99607 MTMS BY PHARM ADDL 15 MIN: CPT | Performed by: PHARMACIST

## 2019-06-06 NOTE — PATIENT INSTRUCTIONS
Recommendations from today's MTM visit:                                                      1. Try using Miralax 17g daily in place of your Senna since this has been a more gentle laxative for you in the past.    2. Rinse out mouth after using Flovent. This will help reduce your risk for thrush.     3. Start OTC Cetirizine 10mg daily (brand: Zyrtec) for your runny nose/ congestion.     4. Talk to team about when you are due for a Pentamidine nebulization.     Next MTM visit: 7/17 at 2 PM    To schedule another MTM appointment, please call the clinic directly or you may call the MTM scheduling line at [phone] or toll-free at [phone].     My Clinical Pharmacist's contact information:                                                      It was a pleasure talking with you today!  Please feel free to contact me with any questions or concerns you have.      Franko Preciado, PharmD  MTM Pharmacist    Phone: [phone]     You may receive a survey about the MTM services you received by email and/or US Mail.  I would appreciate your feedback to help me serve you better in the future. Your comments will be anonymous.

## 2019-06-06 NOTE — PROGRESS NOTES
SUBJECTIVE/OBJECTIVE:                           Everton Larios is a 54 year old male coming in for an initial visit for Medication Therapy Management.  He was referred for TREMAINE. Discharged [facility] on 5/26/19 . Tacrolimus was decreased to 2mg BID. Pt states he has been doing better since discharge. Felt bloated prior to hospitalization- but this has improved. May have possibly been due to constipation.      Chief Complaint:  Wondering if he should be on an iron supplement due to his anemia.     Allergies/ADRs: Reviewed in Epic  Tobacco: No tobacco use  Alcohol: not currently using  Caffeine: 2 cups/day of coffee  Activity: Compression fracture in vertabrae. Starting Cardiac rehab soon.   PMH: Reviewed in [employer], last UC flare in 2005.    Medication Adherence/Access:  Patient using pill empty aspirin bottles to set up his medications. He prefers this method.   Patient takes medications 4 time(s) per day. 8am and 8pm, potassium and magnesium and lunch and dinner time.  Per patient, misses medication 1 times per week. Forgot a tacrolimus dose prior to his last level. He states this was due to setting up his medications while watching TV in the dark.   Medication barriers: none.   The patient fills medications at [facility]: NO, fills medications at [facility]    Heart Transplant:  Current immunosuppressants include Prednisone 5mg qAM(reduced today), TAC 2mg BID, MMF 1500mg BID.  Pt reports no side effects  Transplant date: 2/24/19  Estimated Creatinine Clearance: 27.4 mL/min (A) (based on SCr of 2.38 mg/dL (H)).  CMV prophylaxis: Valcyte stopped due to leukopenia    PCP prophylaxis: Pentamidine.  Recent FRANKLIN so bactrim was held. Pt is unsure when his last dose of Pentamidine was.   Antifungal Prophylaxis: Nystatin QID.   Current supplements for electrolyte replacement: potassium 20mEq daily, Mag Oxide 400mg BID, Calcium Carbonate BID  Magnesium   Date Value Ref Range Status   05/28/2019 1.5 (L) 1.6 - 2.3 mg/dL Final    Tx Coordinator: Jose Arreguin MD: Dr. Corinna Donis, Using Med Card: No, using smaller cards that fit in his wallet.   Immunizations: annual flu shot UTD; [id number]:  Due 6 months post txp; Prevnar 13: 2018; TDaP:  2012; Shingrix: Due 6 months post txp    Hypertension: Current medications include Amlodipine 5mg daily, Aspirin 81mg daily.  Patient does self-monitor BP. AMs:140/90s, PM:120-130/80s.  Patient reports no current medication side effects.  BP Readings from Last 3 Encounters:   06/04/19 (!) 142/98   05/28/19 132/87   05/26/19 (!) 132/100     Hyperlipidemia: Current therapy includes Rosuvastatin 10mg once daily.  Pt reports no significant myalgias or other side effects.  The 10-year ASCVD risk score (Zenaida BRADSHAW Jr., et al., 2013) is: 5.6%    Values used to calculate the score:      Age: 54 years      Sex: Male      Is Non- : No      Diabetic: No      Tobacco smoker: No      Systolic Blood Pressure: 142 mmHg      Is BP treated: Yes      HDL Cholesterol: 85 mg/dL      Total Cholesterol: 204 mg/dL    GERD/ Pierce's: Current medications include: Prilosec (omeprazole) 40mg daily. Pt c/o no current symptoms.  Patient feels that current regimen is effective.    Constipation: Pt is taking Miralax 17g prn. Now having diarrhea, reduced stool softener from 2 down to 1 tablet daily.  Pt states MIralax works better at keeping his consistent without inducing diarrhea, but he does not like the taste much.     Back Pain due to recent fracture: Pt is taking APAP BID, Duloxetine 20mg daily. Duloxetine originally started for pain in feet, but when he stopped it, pt noticed his mood worsened. So he has continued to take Duloxetine.     Allergic rhinitis: Current medications include fluticasone nasal spray 1 spray(s) twice daily, Montelukast 10mg daily (pt also has asthma). Primary symptoms are nasal congestion and post-nasal drip. Pt feels that current therapy is not effective. Has used azelastine 1  spray BID- but this was ineffective. Did not try Cetirizine as instructed last visit.     UC: Pt is taking Balsalazide 750mg BID. More dealing with constipation now. No UC issues per patient.    Asthma: Current asthma medications: Albuterol prn, ICS - Flovent 2 puff(s) twice daily. Pt does not rinse their mouth after using steroid inhaler.     Insomnia: Current medications include: melatonin 10mg daily. Pt reports no issues.       Today's Vitals: There were no vitals taken for this visit.      ASSESSMENT:                             Current medications were reviewed today.     Medication Adherence: fair, pt prefers to use pill bottles vs pill box. This is fine. If missed doses become more common we will have to find a solution. .     Heart Transplant:  Last Pentamidine listed was on 5/28, but patient believes he did not receive this nebulization. I will check with txp coordinator. If patient is interested in starting iron, we should get iron indices first to assess need. Last Ferritin was >1000, which indicates no iron is needed, but it was from November of last year.     Hypertension: Amlodipine recently started due to high BPs.     Hyperlipidemia: Stable.     GERD/ Pierce's: Stable.     Constipation: Pt instructed to switch to Miralax for now until he is having regular solid/ soft stools. This has caused less diarrhea for him in the past.     Back Pain due to recent fracture: If patient's CrCl does not improve past 30 ml/min, we should consider an alternative medication to Duloxetine, eg. Venlafaxine.     Allergic rhinitis: Pt instructed to try Cetirizine for allergy symptoms.     UC: Stable.     Asthma: Pt instructed to rinse out mouth after using steroid inhalers.     Insomnia: Stable.     PLAN:                            Post Discharge Medication Reconciliation Status: discharge medications reconciled and changed, per note/orders (see AVS).    Txp team consider...  1. Iron indices to assess need for oral iron  in the setting of anemia.   2. Pt does not believe he has had a recent pentamidine, will need an appointment scheduled if this is the case.    Pt to...  1. Use Cetirizine for allergy symptoms.   2. Use Miralax in place of Senna.   3. Rinse mouth out after using Flovent.      I spent 30 minutes with this patient today. I offer these suggestions for consideration by txp team. A copy of the visit note was provided to the patient's referring provider.    Will follow up in 2 months.    The patient was given a summary of these recommendations as an after visit summary.     Franko Preciado, PharmD  [facility] Pharmacist    Phone: [phone]

## 2019-06-07 ENCOUNTER — TELEPHONE (OUTPATIENT)
Dept: TRANSPLANT | Facility: CLINIC | Age: 54
End: 2019-06-07

## 2019-06-07 ENCOUNTER — TELEPHONE (OUTPATIENT)
Dept: CARE COORDINATION | Facility: CLINIC | Age: 54
End: 2019-06-07

## 2019-06-07 DIAGNOSIS — Z94.1 TRANSPLANTED HEART (H): Primary | ICD-10-CM

## 2019-06-07 LAB
TACROLIMUS BLD-MCNC: 5.4 UG/L (ref 5–15)
TME LAST DOSE: NORMAL H

## 2019-06-07 RX ORDER — TACROLIMUS 0.5 MG/1
0.5 CAPSULE ORAL DAILY
Qty: 90 CAPSULE | Refills: 3 | Status: SHIPPED | OUTPATIENT
Start: 2019-06-07 | End: 2019-06-18

## 2019-06-07 NOTE — TELEPHONE ENCOUNTER
Tacro 5.4. Goal 8-10. Good 12 hour trough per patient. Current dose 2 mg bid. Instructions to increase to 2.5 mg in am and 2 mg in pm. (o.5 mg tabs called into pharmacy). Repeat a tacro dose in 1 week.

## 2019-06-07 NOTE — TELEPHONE ENCOUNTER
Hbx negative. Prednisone dc'd. Pt informed and verbalized an understanding. Med list updated. Awaiting to hear back from Dr. Donis whether we can discontinue heart biopsy on 6/18.

## 2019-06-07 NOTE — TELEPHONE ENCOUNTER
Heart Transplant  has been addressing pt's financial concerns around recent bills. Writer has assisted pt in accessing the [employer] maryam to pay bills. Pt has received financial assistance for the following bills:    [place] [employer]: $307  [employer]: 349.18  [employer]: 99.90  [employer]: 180.35  [place] [employer]: $15    Today, writer has sent the following bills to [employer] for consideration of payment:    [employer]: $15  [employer]: $64.39  [employer]: $46.60  [employer]: $280.14  [employer]: $60.85  [employer]: $96.00    Pt is aware that he is reaching the max use of this maryam, if the newest bills go through. Pt is eager to get back to work and is hopeful that he will be able to return at the end of the month.

## 2019-06-10 ENCOUNTER — AMBULATORY - HEALTHEAST (OUTPATIENT)
Dept: CARDIAC REHAB | Facility: HOSPITAL | Age: 54
End: 2019-06-10

## 2019-06-10 ENCOUNTER — TELEPHONE (OUTPATIENT)
Dept: TRANSPLANT | Facility: CLINIC | Age: 54
End: 2019-06-10

## 2019-06-10 DIAGNOSIS — Z94.1 STATUS POST HEART TRANSPLANTATION (H): ICD-10-CM

## 2019-06-10 NOTE — TELEPHONE ENCOUNTER
Per Dr. Donis, ok to cancel 4 month hbx since patient just had a biopsy completed (ngative). Will continue with 4 month clinic appt and labs.   Pt notified via phone, no answer. VM left. I just need to confirm when he wants to have his lab appt so we get a 12 hour trough.

## 2019-06-11 ENCOUNTER — TELEPHONE (OUTPATIENT)
Dept: TRANSPLANT | Facility: CLINIC | Age: 54
End: 2019-06-11

## 2019-06-11 DIAGNOSIS — Z94.1 TRANSPLANTED HEART (H): Primary | ICD-10-CM

## 2019-06-11 NOTE — TELEPHONE ENCOUNTER
Patient Call: Transplant Lab/Orders  Route to LPN  Post Transplant Days: 107  When patient is less than 60 days post-transplant, route high priority    PT wants to know if he needs labs this week and next week if so the orders need to be sent to [facility] in [place]  Please Connect with PT    Reason for Call: Clarification; which order? [facility]  Callback needed? Yes  Return Call Needed  Same as documented in contacts section  When to return call?: Same day: Route High Priority

## 2019-06-13 ENCOUNTER — AMBULATORY - HEALTHEAST (OUTPATIENT)
Dept: CARDIAC REHAB | Facility: HOSPITAL | Age: 54
End: 2019-06-13

## 2019-06-13 ENCOUNTER — PRE VISIT (OUTPATIENT)
Dept: TRANSPLANT | Facility: CLINIC | Age: 54
End: 2019-06-13

## 2019-06-13 DIAGNOSIS — Z94.1 STATUS POST HEART TRANSPLANTATION (H): ICD-10-CM

## 2019-06-13 DIAGNOSIS — Z94.1 TRANSPLANTED HEART (H): Primary | ICD-10-CM

## 2019-06-13 NOTE — TELEPHONE ENCOUNTER
Call returned. Lab repeat clarified. Orders in. Pt has an appt setup for 6/17. Pt verbalized an understanding.

## 2019-06-16 DIAGNOSIS — Z94.1 HEART REPLACED BY TRANSPLANT (H): ICD-10-CM

## 2019-06-17 ENCOUNTER — AMBULATORY - HEALTHEAST (OUTPATIENT)
Dept: CARDIAC REHAB | Facility: HOSPITAL | Age: 54
End: 2019-06-17

## 2019-06-17 DIAGNOSIS — Z94.1 STATUS POST HEART TRANSPLANTATION (H): ICD-10-CM

## 2019-06-17 DIAGNOSIS — Z94.1 HEART REPLACED BY TRANSPLANT (H): ICD-10-CM

## 2019-06-17 DIAGNOSIS — Z94.1 TRANSPLANTED HEART (H): ICD-10-CM

## 2019-06-17 DIAGNOSIS — I10 BENIGN ESSENTIAL HYPERTENSION: ICD-10-CM

## 2019-06-17 LAB
BASOPHILS # BLD AUTO: 0.1 10E9/L (ref 0–0.2)
BASOPHILS NFR BLD AUTO: 0.8 %
DIFFERENTIAL METHOD BLD: ABNORMAL
EOSINOPHIL # BLD AUTO: 0.1 10E9/L (ref 0–0.7)
EOSINOPHIL NFR BLD AUTO: 1.9 %
ERYTHROCYTE [DISTWIDTH] IN BLOOD BY AUTOMATED COUNT: 15.8 % (ref 10–15)
HCT VFR BLD AUTO: 29.6 % (ref 40–53)
HGB BLD-MCNC: 9.6 G/DL (ref 13.3–17.7)
LYMPHOCYTES # BLD AUTO: 1.1 10E9/L (ref 0.8–5.3)
LYMPHOCYTES NFR BLD AUTO: 17.4 %
MCH RBC QN AUTO: 30.2 PG (ref 26.5–33)
MCHC RBC AUTO-ENTMCNC: 32.4 G/DL (ref 31.5–36.5)
MCV RBC AUTO: 93 FL (ref 78–100)
MONOCYTES # BLD AUTO: 1.1 10E9/L (ref 0–1.3)
MONOCYTES NFR BLD AUTO: 17.1 %
NEUTROPHILS # BLD AUTO: 4 10E9/L (ref 1.6–8.3)
NEUTROPHILS NFR BLD AUTO: 62.8 %
PLATELET # BLD AUTO: 289 10E9/L (ref 150–450)
RBC # BLD AUTO: 3.18 10E12/L (ref 4.4–5.9)
WBC # BLD AUTO: 6.4 10E9/L (ref 4–11)

## 2019-06-17 PROCEDURE — 84100 ASSAY OF PHOSPHORUS: CPT | Performed by: INTERNAL MEDICINE

## 2019-06-17 PROCEDURE — 36415 COLL VENOUS BLD VENIPUNCTURE: CPT | Performed by: INTERNAL MEDICINE

## 2019-06-17 PROCEDURE — 83735 ASSAY OF MAGNESIUM: CPT | Performed by: INTERNAL MEDICINE

## 2019-06-17 PROCEDURE — 80053 COMPREHEN METABOLIC PANEL: CPT | Performed by: INTERNAL MEDICINE

## 2019-06-17 PROCEDURE — 80197 ASSAY OF TACROLIMUS: CPT | Performed by: INTERNAL MEDICINE

## 2019-06-17 PROCEDURE — 82550 ASSAY OF CK (CPK): CPT | Performed by: INTERNAL MEDICINE

## 2019-06-17 PROCEDURE — 85025 COMPLETE CBC W/AUTO DIFF WBC: CPT | Performed by: INTERNAL MEDICINE

## 2019-06-18 ENCOUNTER — OFFICE VISIT (OUTPATIENT)
Dept: CARDIOLOGY | Facility: CLINIC | Age: 54
End: 2019-06-18
Attending: NURSE PRACTITIONER
Payer: COMMERCIAL

## 2019-06-18 ENCOUNTER — RECORDS - HEALTHEAST (OUTPATIENT)
Dept: ADMINISTRATIVE | Facility: OTHER | Age: 54
End: 2019-06-18

## 2019-06-18 VITALS
BODY MASS INDEX: 19.55 KG/M2 | WEIGHT: 129 LBS | HEART RATE: 116 BPM | SYSTOLIC BLOOD PRESSURE: 130 MMHG | HEIGHT: 68 IN | OXYGEN SATURATION: 99 % | DIASTOLIC BLOOD PRESSURE: 89 MMHG

## 2019-06-18 DIAGNOSIS — Z94.1 TRANSPLANTED HEART (H): Primary | ICD-10-CM

## 2019-06-18 DIAGNOSIS — Z94.1 HEART REPLACED BY TRANSPLANT (H): ICD-10-CM

## 2019-06-18 DIAGNOSIS — D72.9 ABNORMAL WHITE BLOOD CELL (WBC) COUNT: Primary | ICD-10-CM

## 2019-06-18 LAB
ALBUMIN SERPL-MCNC: 3.8 G/DL (ref 3.4–5)
ALP SERPL-CCNC: 135 U/L (ref 40–150)
ALT SERPL W P-5'-P-CCNC: 17 U/L (ref 0–70)
ANION GAP SERPL CALCULATED.3IONS-SCNC: 13 MMOL/L (ref 3–14)
AST SERPL W P-5'-P-CCNC: 19 U/L (ref 0–45)
BILIRUB SERPL-MCNC: 0.4 MG/DL (ref 0.2–1.3)
BUN SERPL-MCNC: 27 MG/DL (ref 7–30)
CALCIUM SERPL-MCNC: 8.8 MG/DL (ref 8.5–10.1)
CHLORIDE SERPL-SCNC: 104 MMOL/L (ref 94–109)
CK SERPL-CCNC: 46 U/L (ref 30–300)
CO2 SERPL-SCNC: 20 MMOL/L (ref 20–32)
CREAT SERPL-MCNC: 1.39 MG/DL (ref 0.66–1.25)
GFR SERPL CREATININE-BSD FRML MDRD: 56 ML/MIN/{1.73_M2}
GLUCOSE SERPL-MCNC: 80 MG/DL (ref 70–99)
MAGNESIUM SERPL-MCNC: 1.4 MG/DL (ref 1.6–2.3)
PHOSPHATE SERPL-MCNC: 3.8 MG/DL (ref 2.5–4.5)
POTASSIUM SERPL-SCNC: 4.1 MMOL/L (ref 3.4–5.3)
PROT SERPL-MCNC: 6.6 G/DL (ref 6.8–8.8)
SODIUM SERPL-SCNC: 137 MMOL/L (ref 133–144)
TACROLIMUS BLD-MCNC: 6.7 UG/L (ref 5–15)
TME LAST DOSE: NORMAL H

## 2019-06-18 PROCEDURE — 99214 OFFICE O/P EST MOD 30 MIN: CPT | Mod: ZP | Performed by: NURSE PRACTITIONER

## 2019-06-18 PROCEDURE — G0463 HOSPITAL OUTPT CLINIC VISIT: HCPCS | Mod: ZF

## 2019-06-18 RX ORDER — BALSALAZIDE DISODIUM 750 MG/1
CAPSULE ORAL
Status: CANCELLED | COMMUNITY
Start: 2019-06-18

## 2019-06-18 RX ORDER — PENTAMIDINE ISETHIONATE 300 MG/300MG
300 INHALANT RESPIRATORY (INHALATION) ONCE
Status: CANCELLED | OUTPATIENT
Start: 2019-07-16

## 2019-06-18 RX ORDER — PENTAMIDINE ISETHIONATE 300 MG/300MG
300 INHALANT RESPIRATORY (INHALATION) ONCE
Status: COMPLETED | OUTPATIENT
Start: 2019-06-18 | End: 2019-06-18

## 2019-06-18 RX ORDER — ALBUTEROL SULFATE 0.83 MG/ML
2.5 SOLUTION RESPIRATORY (INHALATION) ONCE
Status: COMPLETED | OUTPATIENT
Start: 2019-06-18 | End: 2019-06-18

## 2019-06-18 RX ORDER — CETIRIZINE HYDROCHLORIDE 10 MG/1
10 TABLET ORAL DAILY
COMMUNITY

## 2019-06-18 RX ORDER — ALBUTEROL SULFATE 0.83 MG/ML
2.5 SOLUTION RESPIRATORY (INHALATION) ONCE
Status: CANCELLED | OUTPATIENT
Start: 2019-07-16

## 2019-06-18 RX ORDER — TACROLIMUS 0.5 MG/1
CAPSULE ORAL
Qty: 90 CAPSULE | Refills: 3 | Status: SHIPPED | OUTPATIENT
Start: 2019-06-18 | End: 2019-07-18

## 2019-06-18 RX ADMIN — ALBUTEROL SULFATE 2.5 MG: 0.83 SOLUTION RESPIRATORY (INHALATION) at 15:49

## 2019-06-18 RX ADMIN — PENTAMIDINE ISETHIONATE 300 MG: 300 INHALANT RESPIRATORY (INHALATION) at 15:51

## 2019-06-18 ASSESSMENT — PAIN SCALES - GENERAL: PAINLEVEL: NO PAIN (0)

## 2019-06-18 ASSESSMENT — MIFFLIN-ST. JEOR: SCORE: 1389.64

## 2019-06-18 NOTE — LETTER
June 18, 2019      RE: Everton Larios  [address] [address]         To Whom it May Concern --       I take care of Mr. Larios in the cardiology clinic here at [facility].  At this point, we are comfortable with him beginning to return to work.  We recommend that he resume his usual work responsibilities, as he can tolerate.  We only ask that he be permitted rest breaks, if needed, and that he escalate his work responsibilities as he can tolerate.    If you have any questions, please feel free to contact our coordinators at [phone].      Sincerely,            Elin Jensen, GHADA, APRN, FNP-BC  Advanced Heart Failure Nurse Practitioner  [facility]

## 2019-06-18 NOTE — LETTER
6/18/2019      RE: Everton Larios  [address] [facility] [id number]       Dear Colleague,    Thank you for the opportunity to participate in the care of your patient, Everton Larios, at the [facility]. Please see a copy of my visit note below.    ADULT HEART TRANSPLANT CLINIC  June 18, 2019    HPI:   Mr. Everton Larios is 54yr old male with a history of ASD (s/p repair in childhood), AFib/AF (s/p ablation), NICM, diastolic dysfunction, alcohol abuse, Pierce's esophagus, ulcerative colitis, GIB (s/p splenic embolization), and hypothyroidism, now s/p OHT and bypass of persistent left SVC to right atrial appendage 2/24/19, who presents to clinic for routine surveillance.     His postoperative course was c/b shock due to RV dysfunction requiring IABP support, small pneumoperitoneum (clinically insignificant), chest wall hematoma (former ICD site, s/p evacuation and drain placement 3/31/19), HCAP/klebsiella pneumonia, and FRANKLIN (required short-term HD, now recovered).  He was transferred to  rehab 4/3, and ultimately discharged to his local residence 4/15.     His biopsy 3/25/19 showed mild AMR1 with some staining for c4d.  Repeat biopsy 3/28 showed improved AMR and less reactive capillary staining, and subsequent biopsies have now shown resolution of AMR and improved capillary staining.  Baseline coronary angiogram has been deferred due to renal function.  His last echo 6/4 showed stable graft function with LVEF 55-60%, mild RV dilation with mildly reduced RV function, and mild-moderate tricuspid insufficiency (RVSP 29.4 +RAP).    He was hospitalized 4/28-5/9 with fevers, URI symptoms, abdominal pain, and diarrhea, and was found to be neutropenic with pseudomonas bacteremia and HCAP.  Chest CT showed diffuse bilateral solid with peripheral groundglass pulmonary nodules/opacities and mediastinal adenopathy, BAL grew pseudomonas, and fungitell  was positive.  Transplant ID was consulted, and he was treated with 4 weeks of IV cefepime and micafungin.  Repeat CT 5/22 showed significantly decreased bilateral nodular and consolidative opacities.    He represented to [facility] 5/24 with FRANKLIN (creatinine 3.43), thought to be secondary to poor intake and self-adjusting diuretics.  He was given 2L NS and 1L LR, with creatinine trending down upon discharge 5/26.    Since discharge, he states that he feels well overall.  His biggest issue has been constipation, which limits his ability to deep breathe.  He recently increased miralax to twice weekly, which has helped some.  He continues to work with CR twice weekly, and notes that he still gets SOB after each session.  He believes that his strength, endurance, and breathing status have improved overall.  He has been eating regularly, with no nausea, vomiting, or diarrhea.  His weight has been stable, ranging 120-122#.  He does not feel any fluid retention, and has not been taking any diuretics.  He has been drinking at least 2L each day.  His BPs remain <140/90.  He otherwise denies chest pain, palpitations, PND, orthopnea, dizziness, headaches, acute vision changes, fevers, chills, cough, and sore throat.       Serostatus:  CMV D+/R+  EBV D+/R+    Patient Active Problem List    Diagnosis Date Noted     Transplanted heart (H) 05/30/2019     Priority: Medium     Added automatically from request for surgery [id number]       Central line clotted (H) 05/24/2019     Priority: Medium     FRANKLIN (acute kidney injury) (H) 05/24/2019     Priority: Medium     Sepsis due to Pseudomonas aeruginosa (H) 04/30/2019     Priority: Medium     Pulmonary nodules 04/30/2019     Priority: Medium     Compression fracture of thoracic vertebra (H) 04/30/2019     Priority: Medium     Abnormal white blood cell (WBC) count 04/26/2019     Priority: Medium     Debility 04/03/2019     Priority: Medium     Heart replaced by transplant (H) 02/24/2019      Priority: Medium     Donor CMV positive/ Recipient CMV positive.   Donor EBV positive/ Recipient EBV positive.     Donor NOT PHS higher risk          Iron deficiency anemia 10/18/2018     Priority: Medium     H/O congenital atrial septal defect (ASD) repair at age 3 10/04/2018     Priority: Medium     Ulcerative colitis (H) 10/04/2018     Priority: Medium     Hypothyroidism due to medication 10/04/2018     Priority: Medium     DJD (degenerative joint disease) - neck 10/04/2018     Priority: Medium     Pierce's esophagus 10/04/2018     Priority: Medium     Intermittent asthma without complication 10/04/2018     Priority: Medium     Chronic rhinitis 10/04/2018     Priority: Medium     Depression 10/04/2018     Priority: Medium       PAST MEDICAL HISTORY:  Past Medical History:   Diagnosis Date     Alcohol abuse      Pierce's esophagus 10/4/2018     Chronic rhinitis 10/4/2018     Chronic systolic heart failure (H) 10/4/2018     Depression 10/4/2018     Diastolic dysfunction      DJD (degenerative joint disease) - neck 10/4/2018     H/O congenital atrial septal defect (ASD) repair at age 3 10/4/2018     Hypothyroidism due to medication 10/4/2018     ICD, Global CIO scientific 2008; gen change 2/2018 10/4/2018     Intermittent asthma without complication 10/4/2018     Nonischemic cardiomyopathy (H) 10/4/2018     On amiodarone therapy 10/4/2018     Paroxysmal atrial fibrillation (H) 10/4/2018     S/P ablation of atrial fibrillation 10/4/2018     Systolic heart failure (H)      Ulcerative colitis (H) 10/4/2018     Ventricular tachycardia (H) 10/4/2018       CURRENT MEDICATIONS:  Prescription Medications as of 6/18/2019       Rx Number Disp Refills Start End Last Dispensed Date Next Fill Date Owning Pharmacy    acetaminophen (TYLENOL) 325 MG tablet    4/1/2019        Sig: Take 2 tablets (650 mg) by mouth every 4 hours as needed for mild pain    Class: Transitional    Route: Oral    albuterol (PROAIR HFA/PROVENTIL HFA/VENTOLIN  HFA) 108 (90 Base) MCG/ACT inhaler    10/4/2018        Sig: Inhale 2 puffs into the lungs every 6 hours as needed for shortness of breath / dyspnea or wheezing    Class: Historical    Route: Inhalation    amLODIPine (NORVASC) 5 MG tablet  30 tablet 1 5/27/2019    [id number] [place] [address]    Sig: Take 1 tablet (5 mg) by mouth daily    Class: E-Prescribe    Route: Oral    aspirin (ASA) 81 MG chewable tablet    4/2/2019        Sig: Take 1 tablet (81 mg) by mouth daily    Class: Transitional    Route: Oral    balsalazide (COLAZAL) 750 MG capsule    11/9/2018    [id number] [place] [address]    Sig: TAKE 2 CAPSULES BY MOUTH TWICE DAILY    Class: Historical    calcium carbonate 600 mg-vitamin D 400 units (CALTRATE) 600-400 MG-UNIT per tablet    4/1/2019        Sig: Take 1 tablet by mouth 2 times daily (with meals)    Class: Transitional    Route: Oral    cetirizine (ZYRTEC) 10 MG tablet        [id number] [place] [address]    Sig: Take 10 mg by mouth daily    Class: Historical    Route: Oral    DULoxetine (CYMBALTA) 20 MG EC capsule            Sig: Take 20 mg by mouth daily    Class: Historical    Route: Oral    fluticasone (FLONASE) 50 MCG/ACT nasal spray            Sig: Spray 1 spray into both nostrils 2 times daily    Class: Historical    Route: Both Nostrils    fluticasone (FLOVENT HFA) 220 MCG/ACT Inhaler            Sig: Inhale 2 puffs into the lungs 2 times daily    Class: Historical    Route: Inhalation    levothyroxine (SYNTHROID/LEVOTHROID) 100 MCG tablet            Sig: Take 100 mcg by mouth daily     Class: Historical    Route: Oral    magnesium oxide (MAG-OX) 400 MG tablet  180 tablet 3 4/19/2019    [id number] [place] [address]    Sig: Take 1 tablet (400 mg) by mouth 2 times daily    Class: E-Prescribe    Notes to Pharmacy: Increase in dose    Route: Oral    melatonin 3 MG tablet    4/1/2019         Sig: Take 2 tablets (6 mg) by mouth At Bedtime    Class: Transitional    Route: Oral    montelukast (SINGULAIR) 10 MG tablet    10/4/2018        Sig: Take 1 tablet (10 mg) by mouth At Bedtime    Class: Historical    Route: Oral    multivitamin w/minerals (THERA-VIT-M) tablet    4/12/2019        Sig: Take 1 tablet by mouth daily    Class: OTC    Route: Oral    mycophenolate (GENERIC EQUIVALENT) 250 MG capsule  360 capsule 11 5/24/2019    [id number] [place] [address]    Sig: Take 6 capsules (1,500 mg) by mouth 2 times daily    Class: E-Prescribe    Route: Oral    omeprazole (PRILOSEC) 40 MG DR capsule    4/26/2019    [id number] [place] [address]    Sig: Take 1 capsule (40 mg) by mouth daily    Class: Historical    Route: Oral    potassium chloride ER (K-DUR/KLOR-CON M) 20 MEQ CR tablet  120 tablet 0 5/24/2019    [id number] [place] [address]    Sig: Take 1 tablet (20 mEq) by mouth daily    Class: E-Prescribe    Route: Oral    rosuvastatin (CRESTOR) 10 MG tablet  90 tablet 1 5/20/2019    [id number] [place] [address]    Sig: Take 1 tablet (10 mg) by mouth daily    Class: E-Prescribe    Route: Oral    senna (SENOKOT) 8.6 MG tablet            Sig: Take 2 tablets by mouth 2 times daily as needed for constipation    Class: Historical    Route: Oral    tacrolimus (GENERIC EQUIVALENT) 0.5 MG capsule  90 capsule 3 6/7/2019    [id number] [place] [address]    Sig: Take 1 capsule (0.5 mg) by mouth daily With am dose to equal 2.5 mg.    Class: E-Prescribe    Route: Oral    tacrolimus (GENERIC EQUIVALENT) 1 MG capsule  360 capsule 3 6/4/2019    [id number] [place] [address]    Sig: Take 2 mg by mouth  in the morning and  2 mg  in the evening    Class: E-Prescribe    nystatin (MYCOSTATIN) 062779 UNIT/ML suspension  600 mL 0 4/11/2019    [facility]  "[place], [place] - [address]    Sig: Take 5 mLs (500,000 Units) by mouth 4 times daily    Class: E-Prescribe    Route: Oral      Clinic-Administered Medications as of 6/18/2019       Dose Frequency Start End    pentamidine (NEBUPENT) neb solution 300 mg 300 mg EVERY 28 DAYS 5/28/2019 8/20/2019    Sig: Inhale 300 mg into the lungs every 28 days    Route: Inhalation          ROS:   Constitutional: No fever, chills, or sweats. Stable weight.   ENT: No visual disturbance, ear ache, epistaxis, sore throat.   Allergies/Immunologic: Negative.   Respiratory: As per HPI.  Cardiovascular: As per HPI.   GI: As per HPI.  : No urinary frequency, dysuria, or hematuria.   Integument: Negative.   Psychiatric: Negative.   Neuro: Negative.   Endocrinology: Negative.   Musculoskeletal: Negative    Exam:  /89 (BP Location: Left arm, Patient Position: Chair, Cuff Size: Child)   Pulse 116   Ht 1.727 m (5' 8\")   Wt 58.5 kg (129 lb)   SpO2 99%   BMI 19.61 kg/m     In general, the patient is a pleasant male in no apparent distress.    HEENT: NC/AT. PERRLA. EOMI.  Sclerae white, not injected.    Neck:  No adenopathy, No thyromegaly.    COR: No jugular venous distention when sitting upright.  RRR.  Normal S1 S2 splits physiologically.  No murmur, rub click, or gallop.    Lungs:  CTA. No rhonchi.    Abdomen: soft, nontender, nondistended.  No organomegaly.  Extremities:  No clubbing, cyanosis, or LE edema.    Neuro: Alert & Oriented x 3, grossly non focal.  Integument: no open lesions, rashes, or jaundice.    Labs:  CBC RESULTS:   Lab Results   Component Value Date    WBC 6.4 06/17/2019    RBC 3.18 (L) 06/17/2019    HGB 9.6 (L) 06/17/2019    HCT 29.6 (L) 06/17/2019    MCV 93 06/17/2019    MCH 30.2 06/17/2019    MCHC 32.4 06/17/2019    RDW 15.8 (H) 06/17/2019     06/17/2019       BMP RESULTS:  Lab Results   Component Value Date     06/17/2019    POTASSIUM 4.1 06/17/2019    CHLORIDE 104 06/17/2019    CO2 " 20 06/17/2019    ANIONGAP 13 06/17/2019    GLC 80 06/17/2019    BUN 27 06/17/2019    CR 1.39 (H) 06/17/2019    GFRESTIMATED 56 (L) 06/17/2019    GFRESTBLACK 65 06/17/2019    RADAMES 8.8 06/17/2019      LIPID RESULTS:  Lab Results   Component Value Date    CHOL 204 (H) 05/22/2019    HDL 85 05/22/2019    LDL 84 05/22/2019    TRIG 175 (H) 05/22/2019    NHDL 119 05/22/2019       IMMUNOSUPPRESSANT LEVELS  Lab Results   Component Value Date    TACROL 6.7 06/17/2019    DOSTAC 9:30 PM ON 6/16/19 06/17/2019       No components found for: CK  Lab Results   Component Value Date    MAG 1.4 (L) 06/17/2019     Lab Results   Component Value Date    A1C 5.7 (H) 02/23/2019     Lab Results   Component Value Date    PHOS 3.8 06/17/2019     Lab Results   Component Value Date    NTBNPI 45,480 (H) 03/27/2019     Lab Results   Component Value Date    SAITESTMET [facility] 05/22/2019    SAICELL Class I 05/22/2019    [id number] None 05/22/2019    [id number] None 05/22/2019    SAIREPCOM  05/22/2019      Test performed by modified procedure. Serum heat inactivated and tested   by a modified ([place]) protocol including fetal calf serum addition.   High-risk, mfi >3,000. Mod-risk, mfi 500-3,000.       Lab Results   Component Value Date    SAIITESTME [facility] 05/22/2019    SAIICELL Class II 05/22/2019    [id number] None 05/22/2019    [id number] None 05/22/2019    SAIIREPCOM  05/22/2019      Test performed by modified procedure. Serum heat inactivated and tested   by a modified ([place]) protocol including fetal calf serum addition.   High-risk, mfi >3,000. Mod-risk, mfi 500-3,000.       Lab Results   Component Value Date    CSPEC Plasma, EDTA anticoagulant 06/04/2019       Assessment and Plan:  Mr. Everton Larios is 54yr old male with a history of ASD (s/p repair in childhood), AFib/AF (s/p ablation), NICM, diastolic dysfunction, alcohol abuse, Pierce's esophagus, ulcerative colitis, GIB (s/p splenic embolization), and hypothyroidism, now s/p OHT and bypass of  persistent left SVC to right atrial appendage 2/24/19, who presents to clinic for routine surveillance.     NICM, s/p OHT and bypass of persistent left SVC to right atrial appendage 2/24/19  His postoperative course was c/b shock due to RV dysfunction requiring IABP support, small pneumoperitoneum (clinically insignificant), chest wall hematoma (former ICD site, s/p evacuation and drain placement 3/31/19), HCAP/klebsiella pneumonia, and FRANKLIN (required short-term HD, now recovered).  He was transferred to  rehab 4/3, and ultimately discharged to his local residence 4/15.     His biopsy 3/25/19 showed mild AMR1 with some staining for c4d.  Repeat biopsy 3/28 showed improved AMR and less reactive capillary staining, and subsequent biopsies have now shown resolution of AMR and improved capillary staining.  Baseline coronary angiogram has been deferred due to renal function.  His last echo 6/4 showed stable graft function with LVEF 55-60%, mild RV dilation with mildly reduced RV function, and mild-moderate tricuspid insufficiency (RVSP 29.4 +RAP).    He was hospitalized 4/28-5/9 with fevers, URI symptoms, abdominal pain, and diarrhea, and was found to be neutropenic with pseudomonas bacteremia and HCAP.  Chest CT showed diffuse bilateral solid with peripheral groundglass pulmonary nodules/opacities and mediastinal adenopathy, BAL grew pseudomonas, and fungitell was positive.  Transplant ID was consulted, and he was treated with 4 weeks of IV cefepime and micafungin.  Repeat CT 5/22 showed significantly decreased bilateral nodular and consolidative opacities.    He represented to [facility] 5/24 with FRANKLIN (creatinine 3.43), thought to be secondary to poor intake and self-adjusting diuretics.  He was given 2L NS and 1L LR, with creatinine trending down upon discharge 5/26.    Labs from yesterday showed magnesium 1.4.  Other electrolytes remain stable.  Kidney function continues to improve,with creatinine down to 1.39.  LFTs and  CBC remain stable.  Tacro level was 6.7.    Mr. Larios appears well today.  He is euvolemic, off diuretics.  Advised that he continue to hold diuretics at this time, and to call if he were to experience any weight gain/new symptoms.  His BPs remain stable, so advised that he continue amlodipine and call if BPs persist >140/90.      Serostatus:  - CMV D+/R+  - EBV D+/R+     Immunosuppression:  - tacrolimus, goal level 8-10.  Level from yesterday was 6.7, so increased tacro to 2.5mg twice daily.  Repeat tacro level in 1 week.  - MMF 1500mg twice daily     Graft function:  - BPs:  Stable, remain <140/90.  Continue amlodipine 5mg daily.  - HRs:  Stable, remain >80  - fluid status:   Euvolemic, off diuretics.  Discussed adequate po intake.      PPx:  - CAV:  Aspirin 81mg daily and rosuvastatin 10mg daily  - PCP:   Scheduled to continue pentamidine for 3 additional doses.  Will discuss with Transplant ID if further doses are needed as he is off of prednisone.  - GERD:  Omeprazole  (note h/o carrera's esophagus)  - CMV:   completed valcyte.  - Osteoporosis:  Calcium/vitamin D supplements        Right chest wall hematoma  SVC grafting  Hematoma developed at former ICD site, and he is now s/p evacuation 3/31. SVC graft reportedly patent, so will need to monitor for increased swelling in left upper extremity.   Defer need for anticoagulation to Dr. Donis.      Leukopenia, resolved  Neutropenia, resolved  CBC stable.       The above was reviewed with Mr. Larios, who verbalized understanding and will call with further questions/concerns.    30 minutes spent face-to-face with patient, with >50% in counseling and/or coordination of care as described above.      Elin Jensen, DNP, APRN, FNP-BC  Advanced Heart Failure Nurse Practitioner  [facility]  Patient Care Team:  Fredrick Garcia as PCP - General (Internal Medicine)  Jocelynn Jerez, RN as Transplant Coordinator  FREDRICK GARCIA

## 2019-06-18 NOTE — PATIENT INSTRUCTIONS
Patient Instructions  1. Be sure to take your magnesium twice a day.   2. Your heart rate is ok to be   3. Please keep hydrated. Drink at least 2 liters of fluid a day.   4. Ok to return to work.   5. tacro level 6.7. Goal 8-10 Current dose 2/2.5 mg. Please increase dose to 2.5 mg twice a day. Repeat level in 1 week. Orders are in.     Next transplant clinic appointment:  7/17 with Dr. Donis.   Next lab draw: Repeat labs in 1 weeks.   Coordinator will call with all pending results.     Please call transplant coordinator with any questions:    Jocelynn Jerez RN BSN   Post Heart Transplant Nurse Coordinator  [facility]  Questions: [phone] or [phone]

## 2019-06-18 NOTE — PROGRESS NOTES
ADULT HEART TRANSPLANT CLINIC  June 18, 2019    HPI:   Mr. Everton Larios is 54yr old male with a history of ASD (s/p repair in childhood), AFib/AF (s/p ablation), NICM, diastolic dysfunction, alcohol abuse, Pierce's esophagus, ulcerative colitis, GIB (s/p splenic embolization), and hypothyroidism, now s/p OHT and bypass of persistent left SVC to right atrial appendage 2/24/19, who presents to clinic for routine surveillance.     His postoperative course was c/b shock due to RV dysfunction requiring IABP support, small pneumoperitoneum (clinically insignificant), chest wall hematoma (former ICD site, s/p evacuation and drain placement 3/31/19), HCAP/klebsiella pneumonia, and FRANKLIN (required short-term HD, now recovered).  He was transferred to  rehab 4/3, and ultimately discharged to his local residence 4/15.     His biopsy 3/25/19 showed mild AMR1 with some staining for c4d.  Repeat biopsy 3/28 showed improved AMR and less reactive capillary staining, and subsequent biopsies have now shown resolution of AMR and improved capillary staining.  Baseline coronary angiogram has been deferred due to renal function.  His last echo 6/4 showed stable graft function with LVEF 55-60%, mild RV dilation with mildly reduced RV function, and mild-moderate tricuspid insufficiency (RVSP 29.4 +RAP).    He was hospitalized 4/28-5/9 with fevers, URI symptoms, abdominal pain, and diarrhea, and was found to be neutropenic with pseudomonas bacteremia and HCAP.  Chest CT showed diffuse bilateral solid with peripheral groundglass pulmonary nodules/opacities and mediastinal adenopathy, BAL grew pseudomonas, and fungitell was positive.  Transplant ID was consulted, and he was treated with 4 weeks of IV cefepime and micafungin.  Repeat CT 5/22 showed significantly decreased bilateral nodular and consolidative opacities.    He represented to [facility] 5/24 with FRANKLIN (creatinine 3.43), thought to be secondary to poor intake and self-adjusting diuretics.   He was given 2L NS and 1L LR, with creatinine trending down upon discharge 5/26.    Since discharge, he states that he feels well overall.  His biggest issue has been constipation, which limits his ability to deep breathe.  He recently increased miralax to twice weekly, which has helped some.  He continues to work with CR twice weekly, and notes that he still gets SOB after each session.  He believes that his strength, endurance, and breathing status have improved overall.  He has been eating regularly, with no nausea, vomiting, or diarrhea.  His weight has been stable, ranging 120-122#.  He does not feel any fluid retention, and has not been taking any diuretics.  He has been drinking at least 2L each day.  His BPs remain <140/90.  He otherwise denies chest pain, palpitations, PND, orthopnea, dizziness, headaches, acute vision changes, fevers, chills, cough, and sore throat.       Serostatus:  CMV D+/R+  EBV D+/R+    Patient Active Problem List    Diagnosis Date Noted     Transplanted heart (H) 05/30/2019     Priority: Medium     Added automatically from request for surgery [id number]       Central line clotted (H) 05/24/2019     Priority: Medium     FRANKLIN (acute kidney injury) (H) 05/24/2019     Priority: Medium     Sepsis due to Pseudomonas aeruginosa (H) 04/30/2019     Priority: Medium     Pulmonary nodules 04/30/2019     Priority: Medium     Compression fracture of thoracic vertebra (H) 04/30/2019     Priority: Medium     Abnormal white blood cell (WBC) count 04/26/2019     Priority: Medium     Debility 04/03/2019     Priority: Medium     Heart replaced by transplant (H) 02/24/2019     Priority: Medium     Donor CMV positive/ Recipient CMV positive.   Donor EBV positive/ Recipient EBV positive.     Donor NOT PHS higher risk          Iron deficiency anemia 10/18/2018     Priority: Medium     H/O congenital atrial septal defect (ASD) repair at age 3 10/04/2018     Priority: Medium     Ulcerative colitis (H)  10/04/2018     Priority: Medium     Hypothyroidism due to medication 10/04/2018     Priority: Medium     DJD (degenerative joint disease) - neck 10/04/2018     Priority: Medium     Pierce's esophagus 10/04/2018     Priority: Medium     Intermittent asthma without complication 10/04/2018     Priority: Medium     Chronic rhinitis 10/04/2018     Priority: Medium     Depression 10/04/2018     Priority: Medium       PAST MEDICAL HISTORY:  Past Medical History:   Diagnosis Date     Alcohol abuse      Pierce's esophagus 10/4/2018     Chronic rhinitis 10/4/2018     Chronic systolic heart failure (H) 10/4/2018     Depression 10/4/2018     Diastolic dysfunction      DJD (degenerative joint disease) - neck 10/4/2018     H/O congenital atrial septal defect (ASD) repair at age 3 10/4/2018     Hypothyroidism due to medication 10/4/2018     ICD, Nexeon 2008; gen change 2/2018 10/4/2018     Intermittent asthma without complication 10/4/2018     Nonischemic cardiomyopathy (H) 10/4/2018     On amiodarone therapy 10/4/2018     Paroxysmal atrial fibrillation (H) 10/4/2018     S/P ablation of atrial fibrillation 10/4/2018     Systolic heart failure (H)      Ulcerative colitis (H) 10/4/2018     Ventricular tachycardia (H) 10/4/2018       CURRENT MEDICATIONS:  Prescription Medications as of 6/18/2019       Rx Number Disp Refills Start End Last Dispensed Date Next Fill Date Owning Pharmacy    acetaminophen (TYLENOL) 325 MG tablet    4/1/2019        Sig: Take 2 tablets (650 mg) by mouth every 4 hours as needed for mild pain    Class: Transitional    Route: Oral    albuterol (PROAIR HFA/PROVENTIL HFA/VENTOLIN HFA) 108 (90 Base) MCG/ACT inhaler    10/4/2018        Sig: Inhale 2 puffs into the lungs every 6 hours as needed for shortness of breath / dyspnea or wheezing    Class: Historical    Route: Inhalation    amLODIPine (NORVASC) 5 MG tablet  30 tablet 1 5/27/2019    [id number] [place] [address]     Sig: Take 1 tablet (5 mg) by mouth daily    Class: E-Prescribe    Route: Oral    aspirin (ASA) 81 MG chewable tablet    4/2/2019        Sig: Take 1 tablet (81 mg) by mouth daily    Class: Transitional    Route: Oral    balsalazide (COLAZAL) 750 MG capsule    11/9/2018    [facility] [id number] [place] [address]    Sig: TAKE 2 CAPSULES BY MOUTH TWICE DAILY    Class: Historical    calcium carbonate 600 mg-vitamin D 400 units (CALTRATE) 600-400 MG-UNIT per tablet    4/1/2019        Sig: Take 1 tablet by mouth 2 times daily (with meals)    Class: Transitional    Route: Oral    cetirizine (ZYRTEC) 10 MG tablet        [facility] [id number] [place] [address]    Sig: Take 10 mg by mouth daily    Class: Historical    Route: Oral    DULoxetine (CYMBALTA) 20 MG EC capsule            Sig: Take 20 mg by mouth daily    Class: Historical    Route: Oral    fluticasone (FLONASE) 50 MCG/ACT nasal spray            Sig: Spray 1 spray into both nostrils 2 times daily    Class: Historical    Route: Both Nostrils    fluticasone (FLOVENT HFA) 220 MCG/ACT Inhaler            Sig: Inhale 2 puffs into the lungs 2 times daily    Class: Historical    Route: Inhalation    levothyroxine (SYNTHROID/LEVOTHROID) 100 MCG tablet            Sig: Take 100 mcg by mouth daily     Class: Historical    Route: Oral    magnesium oxide (MAG-OX) 400 MG tablet  180 tablet 3 4/19/2019    [facility] [id number] [place] [address]    Sig: Take 1 tablet (400 mg) by mouth 2 times daily    Class: E-Prescribe    Notes to Pharmacy: Increase in dose    Route: Oral    melatonin 3 MG tablet    4/1/2019        Sig: Take 2 tablets (6 mg) by mouth At Bedtime    Class: Transitional    Route: Oral    montelukast (SINGULAIR) 10 MG tablet    10/4/2018        Sig: Take 1 tablet (10 mg) by mouth At Bedtime    Class: Historical    Route: Oral    multivitamin w/minerals (THERA-VIT-M) tablet    4/12/2019        Sig: Take 1 tablet by  mouth daily    Class: OTC    Route: Oral    mycophenolate (GENERIC EQUIVALENT) 250 MG capsule  360 capsule 11 5/24/2019    [id number] [place] [address]    Sig: Take 6 capsules (1,500 mg) by mouth 2 times daily    Class: E-Prescribe    Route: Oral    omeprazole (PRILOSEC) 40 MG DR capsule    4/26/2019    [facility] [id number] [place] [address]    Sig: Take 1 capsule (40 mg) by mouth daily    Class: Historical    Route: Oral    potassium chloride ER (K-DUR/KLOR-CON M) 20 MEQ CR tablet  120 tablet 0 5/24/2019    [id number] [place] [address]    Sig: Take 1 tablet (20 mEq) by mouth daily    Class: E-Prescribe    Route: Oral    rosuvastatin (CRESTOR) 10 MG tablet  90 tablet 1 5/20/2019    [id number] [place] [address]    Sig: Take 1 tablet (10 mg) by mouth daily    Class: E-Prescribe    Route: Oral    senna (SENOKOT) 8.6 MG tablet            Sig: Take 2 tablets by mouth 2 times daily as needed for constipation    Class: Historical    Route: Oral    tacrolimus (GENERIC EQUIVALENT) 0.5 MG capsule  90 capsule 3 6/7/2019    [id number] [place] [address]    Sig: Take 1 capsule (0.5 mg) by mouth daily With am dose to equal 2.5 mg.    Class: E-Prescribe    Route: Oral    tacrolimus (GENERIC EQUIVALENT) 1 MG capsule  360 capsule 3 6/4/2019    [id number] [place] [address]    Sig: Take 2 mg by mouth  in the morning and  2 mg  in the evening    Class: E-Prescribe    nystatin (MYCOSTATIN) 873128 UNIT/ML suspension  600 mL 0 4/11/2019    [place], [place] - [address]    Sig: Take 5 mLs (500,000 Units) by mouth 4 times daily    Class: E-Prescribe    Route: Oral      Clinic-Administered Medications as of 6/18/2019       Dose Frequency Start End    pentamidine (NEBUPENT) neb solution 300 mg 300 mg EVERY 28 DAYS 5/28/2019 8/20/2019    Sig: Inhale  "300 mg into the lungs every 28 days    Route: Inhalation          ROS:   Constitutional: No fever, chills, or sweats. Stable weight.   ENT: No visual disturbance, ear ache, epistaxis, sore throat.   Allergies/Immunologic: Negative.   Respiratory: As per HPI.  Cardiovascular: As per HPI.   GI: As per HPI.  : No urinary frequency, dysuria, or hematuria.   Integument: Negative.   Psychiatric: Negative.   Neuro: Negative.   Endocrinology: Negative.   Musculoskeletal: Negative    Exam:  /89 (BP Location: Left arm, Patient Position: Chair, Cuff Size: Child)   Pulse 116   Ht 1.727 m (5' 8\")   Wt 58.5 kg (129 lb)   SpO2 99%   BMI 19.61 kg/m    In general, the patient is a pleasant male in no apparent distress.    HEENT: NC/AT. PERRLA. EOMI.  Sclerae white, not injected.    Neck:  No adenopathy, No thyromegaly.    COR: No jugular venous distention when sitting upright.  RRR.  Normal S1 S2 splits physiologically.  No murmur, rub click, or gallop.    Lungs:  CTA. No rhonchi.    Abdomen: soft, nontender, nondistended.  No organomegaly.  Extremities:  No clubbing, cyanosis, or LE edema.    Neuro: Alert & Oriented x 3, grossly non focal.  Integument: no open lesions, rashes, or jaundice.    Labs:  CBC RESULTS:   Lab Results   Component Value Date    WBC 6.4 06/17/2019    RBC 3.18 (L) 06/17/2019    HGB 9.6 (L) 06/17/2019    HCT 29.6 (L) 06/17/2019    MCV 93 06/17/2019    MCH 30.2 06/17/2019    MCHC 32.4 06/17/2019    RDW 15.8 (H) 06/17/2019     06/17/2019       BMP RESULTS:  Lab Results   Component Value Date     06/17/2019    POTASSIUM 4.1 06/17/2019    CHLORIDE 104 06/17/2019    CO2 20 06/17/2019    ANIONGAP 13 06/17/2019    GLC 80 06/17/2019    BUN 27 06/17/2019    CR 1.39 (H) 06/17/2019    GFRESTIMATED 56 (L) 06/17/2019    GFRESTBLACK 65 06/17/2019    RADAMES 8.8 06/17/2019      LIPID RESULTS:  Lab Results   Component Value Date    CHOL 204 (H) 05/22/2019    HDL 85 05/22/2019    LDL 84 05/22/2019    TRIG " 175 (H) 05/22/2019    NHDL 119 05/22/2019       IMMUNOSUPPRESSANT LEVELS  Lab Results   Component Value Date    TACROL 6.7 06/17/2019    DOSTAC 9:30 PM ON 6/16/19 06/17/2019       No components found for: CK  Lab Results   Component Value Date    MAG 1.4 (L) 06/17/2019     Lab Results   Component Value Date    A1C 5.7 (H) 02/23/2019     Lab Results   Component Value Date    PHOS 3.8 06/17/2019     Lab Results   Component Value Date    NTBNPI 45,480 (H) 03/27/2019     Lab Results   Component Value Date    SAITESTMET [facility] 05/22/2019    SAICELL Class I 05/22/2019    [id number] None 05/22/2019    [id number] None 05/22/2019    SAIREPCOM  05/22/2019      Test performed by modified procedure. Serum heat inactivated and tested   by a modified ([place]rtanna) protocol including fetal calf serum addition.   High-risk, mfi >3,000. Mod-risk, mfi 500-3,000.       Lab Results   Component Value Date    SAIITESTME [facility] 05/22/2019    SAIICELL Class II 05/22/2019    [id number] None 05/22/2019    [id number] None 05/22/2019    SAIIREPCOM  05/22/2019      Test performed by modified procedure. Serum heat inactivated and tested   by a modified ([place]rtanna) protocol including fetal calf serum addition.   High-risk, mfi >3,000. Mod-risk, mfi 500-3,000.       Lab Results   Component Value Date    CSPEC Plasma, EDTA anticoagulant 06/04/2019       Assessment and Plan:  Mr. Everton Larios is 54yr old male with a history of ASD (s/p repair in childhood), AFib/AF (s/p ablation), NICM, diastolic dysfunction, alcohol abuse, Pierce's esophagus, ulcerative colitis, GIB (s/p splenic embolization), and hypothyroidism, now s/p OHT and bypass of persistent left SVC to right atrial appendage 2/24/19, who presents to clinic for routine surveillance.     NICM, s/p OHT and bypass of persistent left SVC to right atrial appendage 2/24/19  His postoperative course was c/b shock due to RV dysfunction requiring IABP support, small pneumoperitoneum (clinically insignificant),  chest wall hematoma (former ICD site, s/p evacuation and drain placement 3/31/19), HCAP/klebsiella pneumonia, and FRANKLIN (required short-term HD, now recovered).  He was transferred to  rehab 4/3, and ultimately discharged to his local residence 4/15.     His biopsy 3/25/19 showed mild AMR1 with some staining for c4d.  Repeat biopsy 3/28 showed improved AMR and less reactive capillary staining, and subsequent biopsies have now shown resolution of AMR and improved capillary staining.  Baseline coronary angiogram has been deferred due to renal function.  His last echo 6/4 showed stable graft function with LVEF 55-60%, mild RV dilation with mildly reduced RV function, and mild-moderate tricuspid insufficiency (RVSP 29.4 +RAP).    He was hospitalized 4/28-5/9 with fevers, URI symptoms, abdominal pain, and diarrhea, and was found to be neutropenic with pseudomonas bacteremia and HCAP.  Chest CT showed diffuse bilateral solid with peripheral groundglass pulmonary nodules/opacities and mediastinal adenopathy, BAL grew pseudomonas, and fungitell was positive.  Transplant ID was consulted, and he was treated with 4 weeks of IV cefepime and micafungin.  Repeat CT 5/22 showed significantly decreased bilateral nodular and consolidative opacities.    He represented to [facility] 5/24 with FRANKLIN (creatinine 3.43), thought to be secondary to poor intake and self-adjusting diuretics.  He was given 2L NS and 1L LR, with creatinine trending down upon discharge 5/26.    Labs from yesterday showed magnesium 1.4.  Other electrolytes remain stable.  Kidney function continues to improve,with creatinine down to 1.39.  LFTs and CBC remain stable.  Tacro level was 6.7.    Mr. Larios appears well today.  He is euvolemic, off diuretics.  Advised that he continue to hold diuretics at this time, and to call if he were to experience any weight gain/new symptoms.  His BPs remain stable, so advised that he continue amlodipine and call if BPs persist  >140/90.      Serostatus:  - CMV D+/R+  - EBV D+/R+     Immunosuppression:  - tacrolimus, goal level 8-10.  Level from yesterday was 6.7, so increased tacro to 2.5mg twice daily.  Repeat tacro level in 1 week.  - MMF 1500mg twice daily     Graft function:  - BPs:  Stable, remain <140/90.  Continue amlodipine 5mg daily.  - HRs:  Stable, remain >80  - fluid status:  Euvolemic, off diuretics.  Discussed adequate po intake.      PPx:  - CAV:  Aspirin 81mg daily and rosuvastatin 10mg daily  - PCP:  Scheduled to continue pentamidine for 3 additional doses.  Will discuss with Transplant ID if further doses are needed as he is off of prednisone.  - GERD:  Omeprazole (note h/o carrera's esophagus)  - CMV:  completed valcyte.  - Osteoporosis:  Calcium/vitamin D supplements        Right chest wall hematoma  SVC grafting  Hematoma developed at former ICD site, and he is now s/p evacuation 3/31. SVC graft reportedly patent, so will need to monitor for increased swelling in left upper extremity.  Defer need for anticoagulation to Dr. Donis.      Leukopenia, resolved  Neutropenia, resolved  CBC stable.       The above was reviewed with Mr. Larios, who verbalized understanding and will call with further questions/concerns.    30 minutes spent face-to-face with patient, with >50% in counseling and/or coordination of care as described above.      Elin Jensen, DNP, APRN, FNP-BC  Advanced Heart Failure Nurse Practitioner  [facility]  Patient Care Team:  Fredrick Garcia as PCP - General (Internal Medicine)  Jocelynn Jerez, RN as Transplant Coordinator  FREDRICK GARCIA

## 2019-06-18 NOTE — PROGRESS NOTES
Patient was seen today for a Pentamidine nebulizer tx ordered by Dr. Corinna Donis.    Patient was first given Albuterol 2.5 mg nebulizer, after which Pentamidine 300 mg mixed with 6cc Sterile H2O was administered through a filtered nebulizer.    No adverse side effects noted by the patient.    Pre-Treatment: SpO2= 100%     HR= 116     BBS= clear  Post-Treatment: SpO2= 100%     HR= 119     BBS= clear    Procedure was completed today by Rom Harris, RRT, RPFT

## 2019-06-18 NOTE — NURSING NOTE
Pt presents for 4 month post heart transplant routine visit with NP. Lab work completed on 6/17. Results reviewed. Tacro level 6.7. Goal 8-10. Good 12 hour trough. Cr 1.3. Dose increased to 2.5 mg bid. Repeat level in 1 week. Orders in.  Pt c/o constipation, in which he started taking miralax with some relief. Pt also c/o of dryness. NP discussed hydration and importance of drinking at least 2 liters/day. Pt concerned with . Reassurance given that we want heart rate between , pt verbalized an understanding. Mg 1.4, pt already prescribed magnesium 400 mg bid. Pt states he isn't very good about remembering his evening dose. Ok to return to work, letter provided. Pt scheduled for pentamadine neb following appt. Message sent to Dr. Shukla for review now that the pt is off bactrim. CR improved, today 1.3 (previously 2.3).         Patient Instructions  1. Be sure to take your magnesium twice a day.   2. Your heart rate is ok to be   3. Please keep hydrated. Drink at least 2 liters of fluid a day.   4. Ok to return to work.   5. tacro level 6.7. Goal 8-10 Current dose 2/2.5 mg. Please increase dose to 2.5 mg twice a day. Repeat level in 1 week. Orders are in.

## 2019-06-18 NOTE — NURSING NOTE
Vitals completed successfully and medication reconciled.     Cecilia Alvarado, SAMIR  2:16 PM  Chief Complaint   Patient presents with     Follow Up     Heart TX 16 week

## 2019-06-19 RX ORDER — BUMETANIDE 2 MG/1
2 TABLET ORAL
Qty: 60 TABLET | Refills: 0 | OUTPATIENT
Start: 2019-06-19

## 2019-06-20 ENCOUNTER — AMBULATORY - HEALTHEAST (OUTPATIENT)
Dept: CARDIAC REHAB | Facility: HOSPITAL | Age: 54
End: 2019-06-20

## 2019-06-20 DIAGNOSIS — Z94.1 STATUS POST HEART TRANSPLANTATION (H): ICD-10-CM

## 2019-06-21 RX ORDER — AMLODIPINE BESYLATE 5 MG/1
TABLET ORAL
Qty: 30 TABLET | Refills: 0 | Status: SHIPPED | OUTPATIENT
Start: 2019-06-21 | End: 2019-06-24

## 2019-06-24 ENCOUNTER — AMBULATORY - HEALTHEAST (OUTPATIENT)
Dept: CARDIAC REHAB | Facility: HOSPITAL | Age: 54
End: 2019-06-24

## 2019-06-24 DIAGNOSIS — I10 BENIGN ESSENTIAL HYPERTENSION: ICD-10-CM

## 2019-06-24 DIAGNOSIS — Z94.1 STATUS POST HEART TRANSPLANTATION (H): ICD-10-CM

## 2019-06-26 DIAGNOSIS — A41.52 SEPSIS DUE TO PSEUDOMONAS AERUGINOSA (H): ICD-10-CM

## 2019-06-26 DIAGNOSIS — Z94.1 TRANSPLANTED HEART (H): ICD-10-CM

## 2019-06-26 DIAGNOSIS — D72.9 ABNORMAL WHITE BLOOD CELL (WBC) COUNT: ICD-10-CM

## 2019-06-26 LAB
BASOPHILS # BLD AUTO: 0.1 10E9/L (ref 0–0.2)
BASOPHILS NFR BLD AUTO: 1.8 %
DIFFERENTIAL METHOD BLD: ABNORMAL
EOSINOPHIL # BLD AUTO: 0.3 10E9/L (ref 0–0.7)
EOSINOPHIL NFR BLD AUTO: 5.2 %
ERYTHROCYTE [DISTWIDTH] IN BLOOD BY AUTOMATED COUNT: 15.2 % (ref 10–15)
HCT VFR BLD AUTO: 32.9 % (ref 40–53)
HGB BLD-MCNC: 10.5 G/DL (ref 13.3–17.7)
LYMPHOCYTES # BLD AUTO: 1.2 10E9/L (ref 0.8–5.3)
LYMPHOCYTES NFR BLD AUTO: 21.8 %
MCH RBC QN AUTO: 29.3 PG (ref 26.5–33)
MCHC RBC AUTO-ENTMCNC: 31.9 G/DL (ref 31.5–36.5)
MCV RBC AUTO: 92 FL (ref 78–100)
MONOCYTES # BLD AUTO: 1.1 10E9/L (ref 0–1.3)
MONOCYTES NFR BLD AUTO: 20.8 %
MYCOBACTERIUM SPEC CULT: NORMAL
NEUTROPHILS # BLD AUTO: 2.7 10E9/L (ref 1.6–8.3)
NEUTROPHILS NFR BLD AUTO: 50.4 %
PLATELET # BLD AUTO: 262 10E9/L (ref 150–450)
RBC # BLD AUTO: 3.58 10E12/L (ref 4.4–5.9)
SPECIMEN SOURCE: NORMAL
SPECIMEN SOURCE: NORMAL
WBC # BLD AUTO: 5.4 10E9/L (ref 4–11)

## 2019-06-26 PROCEDURE — 80197 ASSAY OF TACROLIMUS: CPT | Performed by: NURSE PRACTITIONER

## 2019-06-26 PROCEDURE — 36415 COLL VENOUS BLD VENIPUNCTURE: CPT | Performed by: INTERNAL MEDICINE

## 2019-06-26 PROCEDURE — 80053 COMPREHEN METABOLIC PANEL: CPT | Performed by: INTERNAL MEDICINE

## 2019-06-26 PROCEDURE — 85025 COMPLETE CBC W/AUTO DIFF WBC: CPT | Performed by: INTERNAL MEDICINE

## 2019-06-27 ENCOUNTER — TELEPHONE (OUTPATIENT)
Dept: TRANSPLANT | Facility: CLINIC | Age: 54
End: 2019-06-27

## 2019-06-27 ENCOUNTER — AMBULATORY - HEALTHEAST (OUTPATIENT)
Dept: CARDIAC REHAB | Facility: HOSPITAL | Age: 54
End: 2019-06-27

## 2019-06-27 DIAGNOSIS — Z94.1 STATUS POST HEART TRANSPLANTATION (H): ICD-10-CM

## 2019-06-27 LAB
ALBUMIN SERPL-MCNC: 3.8 G/DL (ref 3.4–5)
ALP SERPL-CCNC: 160 U/L (ref 40–150)
ALT SERPL W P-5'-P-CCNC: 15 U/L (ref 0–70)
ANION GAP SERPL CALCULATED.3IONS-SCNC: 11 MMOL/L (ref 3–14)
AST SERPL W P-5'-P-CCNC: 21 U/L (ref 0–45)
BILIRUB SERPL-MCNC: 0.5 MG/DL (ref 0.2–1.3)
BUN SERPL-MCNC: 26 MG/DL (ref 7–30)
CALCIUM SERPL-MCNC: 9 MG/DL (ref 8.5–10.1)
CHLORIDE SERPL-SCNC: 105 MMOL/L (ref 94–109)
CO2 SERPL-SCNC: 23 MMOL/L (ref 20–32)
CREAT SERPL-MCNC: 1.5 MG/DL (ref 0.66–1.25)
GFR SERPL CREATININE-BSD FRML MDRD: 51 ML/MIN/{1.73_M2}
GLUCOSE SERPL-MCNC: 80 MG/DL (ref 70–99)
POTASSIUM SERPL-SCNC: 4 MMOL/L (ref 3.4–5.3)
PROT SERPL-MCNC: 7.1 G/DL (ref 6.8–8.8)
SODIUM SERPL-SCNC: 139 MMOL/L (ref 133–144)
TACROLIMUS BLD-MCNC: 8.2 UG/L (ref 5–15)
TME LAST DOSE: NORMAL H

## 2019-06-27 RX ORDER — AMLODIPINE BESYLATE 5 MG/1
5 TABLET ORAL DAILY
Qty: 90 TABLET | Refills: 0 | Status: SHIPPED | OUTPATIENT
Start: 2019-06-27 | End: 2019-08-24

## 2019-06-27 NOTE — TELEPHONE ENCOUNTER
tacro level 8.2. Goal 8-10. Cr 1.5. Good 12 hour trough. Current dose 2.5/2.5. No changes at this time.

## 2019-06-28 LAB
CMV DNA SPEC NAA+PROBE-ACNC: <137 [IU]/ML
CMV DNA SPEC NAA+PROBE-LOG#: <2.1 {LOG_IU}/ML
SPECIMEN SOURCE: ABNORMAL

## 2019-07-01 ENCOUNTER — TELEPHONE (OUTPATIENT)
Dept: TRANSPLANT | Facility: CLINIC | Age: 54
End: 2019-07-01

## 2019-07-01 ENCOUNTER — AMBULATORY - HEALTHEAST (OUTPATIENT)
Dept: CARDIAC REHAB | Facility: HOSPITAL | Age: 54
End: 2019-07-01

## 2019-07-01 DIAGNOSIS — Z94.1 STATUS POST HEART TRANSPLANTATION (H): ICD-10-CM

## 2019-07-01 NOTE — TELEPHONE ENCOUNTER
Pt called in and states he is having some belly bloating and lower extremity edema. Weight is up 1 lb. He is back to work and it seems to occur after a shift ( at [employer]). I recommended tammy stockings while he is working. He also is eating microwave meals so we discussed low sodium diet. No sob experienced. He is not on a diuretic. Scheduled for hbx on 7/17. Message sent to provider (CN) for review. Pt is scheduled for hbx on 7/17. Pt told to call back in if weight continues to increase and to check in on Wednesday. Pt verbalized and agreed with the plan of care.

## 2019-07-01 NOTE — TELEPHONE ENCOUNTER
Patient Call: General  Route to LPN    Reason for call: Pt would like to review concerns with his coordinator-- he has swelling of both ankles  Worse during the day and subsides some thru the night  Hesitant to leave a meddage  Stated the coordinators never call him back      Call back needed? Yes    Return Call Needed  Same as documented in contacts section  When to return call?: Greater than one day: Route standard priority

## 2019-07-03 ENCOUNTER — TELEPHONE (OUTPATIENT)
Dept: TRANSPLANT | Facility: CLINIC | Age: 54
End: 2019-07-03

## 2019-07-03 NOTE — TELEPHONE ENCOUNTER
Patient Call: Transplant Illness-Edema    Transplanted organ? heart and lung  Illness: Swelling    Please call # [phone]

## 2019-07-06 ENCOUNTER — COMMUNICATION - HEALTHEAST (OUTPATIENT)
Dept: INTERNAL MEDICINE | Facility: CLINIC | Age: 54
End: 2019-07-06

## 2019-07-06 DIAGNOSIS — J30.9 ALLERGIC RHINITIS: ICD-10-CM

## 2019-07-08 ENCOUNTER — AMBULATORY - HEALTHEAST (OUTPATIENT)
Dept: CARDIAC REHAB | Facility: HOSPITAL | Age: 54
End: 2019-07-08

## 2019-07-08 DIAGNOSIS — Z94.1 STATUS POST HEART TRANSPLANTATION (H): ICD-10-CM

## 2019-07-11 ENCOUNTER — AMBULATORY - HEALTHEAST (OUTPATIENT)
Dept: CARDIAC REHAB | Facility: HOSPITAL | Age: 54
End: 2019-07-11

## 2019-07-11 DIAGNOSIS — Z94.1 STATUS POST HEART TRANSPLANTATION (H): ICD-10-CM

## 2019-07-12 ENCOUNTER — PRE VISIT (OUTPATIENT)
Dept: TRANSPLANT | Facility: CLINIC | Age: 54
End: 2019-07-12

## 2019-07-12 DIAGNOSIS — Z94.1 TRANSPLANTED HEART (H): Primary | ICD-10-CM

## 2019-07-12 RX ORDER — LIDOCAINE 40 MG/G
CREAM TOPICAL
Status: CANCELLED | OUTPATIENT
Start: 2019-07-12

## 2019-07-15 ENCOUNTER — AMBULATORY - HEALTHEAST (OUTPATIENT)
Dept: CARDIAC REHAB | Facility: HOSPITAL | Age: 54
End: 2019-07-15

## 2019-07-15 DIAGNOSIS — Z94.1 STATUS POST HEART TRANSPLANTATION (H): ICD-10-CM

## 2019-07-16 ENCOUNTER — TELEPHONE (OUTPATIENT)
Dept: TRANSPLANT | Facility: CLINIC | Age: 54
End: 2019-07-16

## 2019-07-16 NOTE — TELEPHONE ENCOUNTER
Pt called to discuss appointment times for 7/17 and to review instructions. (12 hour trough) no answer. Unable to leave a VM.

## 2019-07-16 NOTE — NURSING NOTE
Pt is present for his 5 month post heart transplant follow up with Dr. Donis. Hbx and lab work pending. Pt also has an appointment with the pharmacist today. Tacro goal 8-10. Current dose 2.5/2. Today's level pending. Cellcept 1500 bid. C/o itching and flare up of gout. BP's at home are 115/80's. BP recheck in clinic 110/56. Weight stable. Allomap pending. Trend 30. Pt to have hbx at 6 months then we can discuss hbx at 8 and 10 months given AMR history in 3/2019. AVS printed and given to patient.     Patient instructions:   1. No changes today.   2. I will call you with all pending lab results today.   3. Keep taking potassium as discussed.   4. I will complete paperwork and send it back to you.   5. Pentamidine neb scheduled for 7/24 at 1200    Return to clinic for 6 month post heart transplant follow up on 8/13. Rhc, hbx, angio, and labs scheduled. Dr. Shukla (infectious disease) f/u scheduled on 9/3.     Jocelynn Jerez, RN BSN   Post Heart Transplant Nurse Coordinator  [facility]  Questions: [phone]

## 2019-07-17 ENCOUNTER — OFFICE VISIT (OUTPATIENT)
Dept: PHARMACY | Facility: CLINIC | Age: 54
End: 2019-07-17
Payer: COMMERCIAL

## 2019-07-17 ENCOUNTER — RECORDS - HEALTHEAST (OUTPATIENT)
Dept: ADMINISTRATIVE | Facility: OTHER | Age: 54
End: 2019-07-17

## 2019-07-17 ENCOUNTER — APPOINTMENT (OUTPATIENT)
Dept: LAB | Facility: CLINIC | Age: 54
End: 2019-07-17
Attending: INTERNAL MEDICINE
Payer: COMMERCIAL

## 2019-07-17 ENCOUNTER — OFFICE VISIT (OUTPATIENT)
Dept: CARDIOLOGY | Facility: CLINIC | Age: 54
End: 2019-07-17
Attending: INTERNAL MEDICINE
Payer: COMMERCIAL

## 2019-07-17 ENCOUNTER — HOSPITAL ENCOUNTER (OUTPATIENT)
Facility: CLINIC | Age: 54
Discharge: HOME | End: 2019-07-17
Attending: INTERNAL MEDICINE | Admitting: INTERNAL MEDICINE
Payer: COMMERCIAL

## 2019-07-17 ENCOUNTER — APPOINTMENT (OUTPATIENT)
Dept: MEDSURG UNIT | Facility: CLINIC | Age: 54
End: 2019-07-17
Attending: INTERNAL MEDICINE
Payer: COMMERCIAL

## 2019-07-17 VITALS
RESPIRATION RATE: 18 BRPM | DIASTOLIC BLOOD PRESSURE: 75 MMHG | OXYGEN SATURATION: 99 % | HEART RATE: 106 BPM | TEMPERATURE: 98.7 F | SYSTOLIC BLOOD PRESSURE: 143 MMHG

## 2019-07-17 VITALS
HEIGHT: 68 IN | OXYGEN SATURATION: 100 % | SYSTOLIC BLOOD PRESSURE: 107 MMHG | DIASTOLIC BLOOD PRESSURE: 69 MMHG | WEIGHT: 132.6 LBS | HEART RATE: 110 BPM | BODY MASS INDEX: 20.1 KG/M2

## 2019-07-17 DIAGNOSIS — K21.00 GASTROESOPHAGEAL REFLUX DISEASE WITH ESOPHAGITIS: ICD-10-CM

## 2019-07-17 DIAGNOSIS — K51.919 ULCERATIVE COLITIS WITH COMPLICATION, UNSPECIFIED LOCATION (H): ICD-10-CM

## 2019-07-17 DIAGNOSIS — Z94.1 HEART REPLACED BY TRANSPLANT (H): ICD-10-CM

## 2019-07-17 DIAGNOSIS — K59.00 CONSTIPATION, UNSPECIFIED CONSTIPATION TYPE: ICD-10-CM

## 2019-07-17 DIAGNOSIS — I10 ESSENTIAL HYPERTENSION: ICD-10-CM

## 2019-07-17 DIAGNOSIS — J45.909 ASTHMA, UNSPECIFIED ASTHMA SEVERITY, UNSPECIFIED WHETHER COMPLICATED, UNSPECIFIED WHETHER PERSISTENT: ICD-10-CM

## 2019-07-17 DIAGNOSIS — Z94.1 TRANSPLANTED HEART (H): ICD-10-CM

## 2019-07-17 DIAGNOSIS — E78.2 MIXED HYPERLIPIDEMIA: ICD-10-CM

## 2019-07-17 DIAGNOSIS — L29.9 PRURITIC DISORDER: ICD-10-CM

## 2019-07-17 DIAGNOSIS — M25.50 MULTIPLE JOINT PAIN: ICD-10-CM

## 2019-07-17 DIAGNOSIS — J30.1 ALLERGIC RHINITIS DUE TO POLLEN, UNSPECIFIED SEASONALITY: ICD-10-CM

## 2019-07-17 DIAGNOSIS — Z94.1 HEART REPLACED BY TRANSPLANT (H): Primary | ICD-10-CM

## 2019-07-17 DIAGNOSIS — Z94.1 TRANSPLANTED HEART (H): Primary | ICD-10-CM

## 2019-07-17 DIAGNOSIS — Z13.29 THYROID DISORDER SCREENING: ICD-10-CM

## 2019-07-17 LAB
ANION GAP SERPL CALCULATED.3IONS-SCNC: 8 MMOL/L (ref 3–14)
BASOPHILS # BLD AUTO: 0.1 10E9/L (ref 0–0.2)
BASOPHILS NFR BLD AUTO: 1 %
BUN SERPL-MCNC: 17 MG/DL (ref 7–30)
CALCIUM SERPL-MCNC: 8.9 MG/DL (ref 8.5–10.1)
CHLORIDE SERPL-SCNC: 103 MMOL/L (ref 94–109)
CK SERPL-CCNC: 34 U/L (ref 30–300)
CO2 SERPL-SCNC: 24 MMOL/L (ref 20–32)
CREAT SERPL-MCNC: 1.07 MG/DL (ref 0.66–1.25)
DIFFERENTIAL METHOD BLD: ABNORMAL
EOSINOPHIL # BLD AUTO: 0.4 10E9/L (ref 0–0.7)
EOSINOPHIL NFR BLD AUTO: 8.7 %
ERYTHROCYTE [DISTWIDTH] IN BLOOD BY AUTOMATED COUNT: 13.7 % (ref 10–15)
GFR SERPL CREATININE-BSD FRML MDRD: 77 ML/MIN/{1.73_M2}
GLUCOSE SERPL-MCNC: 80 MG/DL (ref 70–99)
HCT VFR BLD AUTO: 31 % (ref 40–53)
HGB BLD-MCNC: 9.3 G/DL (ref 13.3–17.7)
IMM GRANULOCYTES # BLD: 0.1 10E9/L (ref 0–0.4)
IMM GRANULOCYTES NFR BLD: 2.5 %
LYMPHOCYTES # BLD AUTO: 1 10E9/L (ref 0.8–5.3)
LYMPHOCYTES NFR BLD AUTO: 19.8 %
MAGNESIUM SERPL-MCNC: 1.7 MG/DL (ref 1.6–2.3)
MCH RBC QN AUTO: 27.5 PG (ref 26.5–33)
MCHC RBC AUTO-ENTMCNC: 30 G/DL (ref 31.5–36.5)
MCV RBC AUTO: 92 FL (ref 78–100)
MONOCYTES # BLD AUTO: 0.6 10E9/L (ref 0–1.3)
MONOCYTES NFR BLD AUTO: 12.2 %
NEUTROPHILS # BLD AUTO: 2.7 10E9/L (ref 1.6–8.3)
NEUTROPHILS NFR BLD AUTO: 55.8 %
NRBC # BLD AUTO: 0 10*3/UL
NRBC BLD AUTO-RTO: 0 /100
PHOSPHATE SERPL-MCNC: 4 MG/DL (ref 2.5–4.5)
PLATELET # BLD AUTO: 244 10E9/L (ref 150–450)
POTASSIUM SERPL-SCNC: 4.2 MMOL/L (ref 3.4–5.3)
RBC # BLD AUTO: 3.38 10E12/L (ref 4.4–5.9)
SODIUM SERPL-SCNC: 135 MMOL/L (ref 133–144)
TACROLIMUS BLD-MCNC: 5.3 UG/L (ref 5–15)
TME LAST DOSE: NORMAL H
TSH SERPL DL<=0.005 MIU/L-ACNC: 1.79 MU/L (ref 0.4–4)
WBC # BLD AUTO: 4.9 10E9/L (ref 4–11)

## 2019-07-17 PROCEDURE — 80048 BASIC METABOLIC PNL TOTAL CA: CPT | Performed by: INTERNAL MEDICINE

## 2019-07-17 PROCEDURE — C1894 INTRO/SHEATH, NON-LASER: HCPCS | Performed by: INTERNAL MEDICINE

## 2019-07-17 PROCEDURE — G0463 HOSPITAL OUTPT CLINIC VISIT: HCPCS | Mod: ZF

## 2019-07-17 PROCEDURE — 88346 IMFLUOR 1ST 1ANTB STAIN PX: CPT | Performed by: INTERNAL MEDICINE

## 2019-07-17 PROCEDURE — 25000125 ZZHC RX 250: Performed by: INTERNAL MEDICINE

## 2019-07-17 PROCEDURE — 99605 MTMS BY PHARM NP 15 MIN: CPT | Performed by: PHARMACIST

## 2019-07-17 PROCEDURE — 80197 ASSAY OF TACROLIMUS: CPT | Performed by: INTERNAL MEDICINE

## 2019-07-17 PROCEDURE — 88307 TISSUE EXAM BY PATHOLOGIST: CPT | Performed by: INTERNAL MEDICINE

## 2019-07-17 PROCEDURE — 27210794 ZZH OR GENERAL SUPPLY STERILE: Performed by: INTERNAL MEDICINE

## 2019-07-17 PROCEDURE — 82550 ASSAY OF CK (CPK): CPT | Performed by: INTERNAL MEDICINE

## 2019-07-17 PROCEDURE — 84100 ASSAY OF PHOSPHORUS: CPT | Performed by: INTERNAL MEDICINE

## 2019-07-17 PROCEDURE — 85025 COMPLETE CBC W/AUTO DIFF WBC: CPT | Performed by: INTERNAL MEDICINE

## 2019-07-17 PROCEDURE — 93505 ENDOMYOCARDIAL BIOPSY: CPT | Performed by: INTERNAL MEDICINE

## 2019-07-17 PROCEDURE — 40000166 ZZH STATISTIC PP CARE STAGE 1

## 2019-07-17 PROCEDURE — 99214 OFFICE O/P EST MOD 30 MIN: CPT | Mod: 24 | Performed by: INTERNAL MEDICINE

## 2019-07-17 PROCEDURE — 84443 ASSAY THYROID STIM HORMONE: CPT | Performed by: INTERNAL MEDICINE

## 2019-07-17 PROCEDURE — 83735 ASSAY OF MAGNESIUM: CPT | Performed by: INTERNAL MEDICINE

## 2019-07-17 PROCEDURE — 99607 MTMS BY PHARM ADDL 15 MIN: CPT | Performed by: PHARMACIST

## 2019-07-17 PROCEDURE — 88350 IMFLUOR EA ADDL 1ANTB STN PX: CPT | Performed by: INTERNAL MEDICINE

## 2019-07-17 RX ORDER — POLYETHYLENE GLYCOL 3350 17 G/17G
1 POWDER, FOR SOLUTION ORAL DAILY
COMMUNITY
End: 2022-11-23

## 2019-07-17 RX ORDER — LIDOCAINE 40 MG/G
CREAM TOPICAL
Status: COMPLETED | OUTPATIENT
Start: 2019-07-17 | End: 2019-07-17

## 2019-07-17 RX ADMIN — LIDOCAINE: 40 CREAM TOPICAL at 11:01

## 2019-07-17 ASSESSMENT — MIFFLIN-ST. JEOR: SCORE: 1405.97

## 2019-07-17 ASSESSMENT — PAIN SCALES - GENERAL: PAINLEVEL: NO PAIN (0)

## 2019-07-17 NOTE — PROGRESS NOTES
1100 Pt on 2a prepped and ready for heart biopsy. Lidocaine applied to right neck. Pt ready for consent.

## 2019-07-17 NOTE — LETTER
7/17/2019      RE: Everton Larios  [address] [facility] [address]       Dear Colleague,    Thank you for the opportunity to participate in the care of your patient, Everton Larios, at the [facility] at [facility]. Please see a copy of my visit note below.    ADULT HEART TRANSPLANT CLINIC    HPI:   Mr. Larios is a 54 year old male who presents to clinic today for routine heart transplant follow-up. Patient has history of ASD (s/p repair in childhood), AFib/AF (s/p ablation), NICM, diastolic dysfunction, alcohol abuse, Pierce's esophagus, ulcerative colitis, GIB (s/p splenic embolization), and hypothyroidism, now s/p orthotopic heart transplant and bypass of persistent left SVC to right atrial appendage 2/24/19. Postop course c/b shock due to RV dysfunction requiring IABP, small pneumoperitoneum, chest wall hematoma (former ICD site, s/p evacuation and drain placement 3/31/19), HCAP/klebsiella pneumonia, and FRANKLIN (required short-term HD, now recovered). He was transferred to rehab 4/3, and ultimately discharged 4/15. Biopsy 3/25/19 showed mild AMR1 with some staining for c4d. Treatment deferred and repeat biopsy 3/28 showed improved AMR and less reactive capillary staining, and subsequent biopsies have now shown resolution of AMR and improved capillary staining.  Baseline coronary angiogram has been deferred due to renal function. Last echo 3/26 showed moderate concentric wall thickening c/w LVH, with LVEF 60-65% and moderately dilated RV with mild-moderate RV dysfunction. He had a fall in early April and found to have compression fracture. Most recently, patient admitted 4/28-5/9 for fevers, URI symptoms, abdominal pain and diarrhea. Found to be neutropenia with pseudomonas bacteremia. CT chest revealed diffuse bilateral solid with peripheral ground glass pulmonary nodules/opacities and mediastinal adenopathy, bronch with BAL grew pseudomonas.  Transplant ID was consulted. fungitell positive so concern for fungal component as well. He was discharged on IV cefepime and micafungin which he has since completed .       Patient reports that overall he has feeling well.  He has had no further fever, chills, nausea, vomiting, or diarrhea. Denies any exertional chest pain, shortness of breath, fatigue, lightheadedness, orthopnea, PND, or edema.  He also denies any chest pain or pressure, sob/andrade, orthopnea, pnd, palpitations, syncope/presyncope or marielle.  He also denies any problems with his medications.      PAST MEDICAL HISTORY:  Past Medical History:   Diagnosis Date     Alcohol abuse      Pierce's esophagus 10/4/2018     Chronic rhinitis 10/4/2018     Chronic systolic heart failure (H) 10/4/2018     Depression 10/4/2018     Diastolic dysfunction      DJD (degenerative joint disease) - neck 10/4/2018     H/O congenital atrial septal defect (ASD) repair at age 3 10/4/2018     Hypothyroidism due to medication 10/4/2018     ICD, [employer] 2008; gen change 2/2018 10/4/2018     Intermittent asthma without complication 10/4/2018     Nonischemic cardiomyopathy (H) 10/4/2018     On amiodarone therapy 10/4/2018     Paroxysmal atrial fibrillation (H) 10/4/2018     S/P ablation of atrial fibrillation 10/4/2018     Systolic heart failure (H)      Ulcerative colitis (H) 10/4/2018     Ventricular tachycardia (H) 10/4/2018       FAMILY HISTORY:  Family History   Problem Relation Age of Onset     Endometrial Cancer Mother      Hypertension Father      Endometrial Cancer Maternal Grandmother        SOCIAL HISTORY:  Socioeconomic History     Marital status: Single   Tobacco Use     Smoking status: Former Smoker     Years: 10.00     Smokeless tobacco: Never Used   Substance and Sexual Activity     Alcohol use: Yes     Alcohol/week: 0.6 oz     Types: 1 Cans of beer per week     Comment: a couple times a week      Drug use: No   Social History Narrative    Everton is a retired  . He lives by himself in a townhouse in [place]. He has no lawn care responsibilities. He will be staying with his folks during his post-hospital early transplant care time. No history of TB exposures.        CURRENT MEDICATIONS:    Current Outpatient Medications on File Prior to Visit:  acetaminophen (TYLENOL) 325 MG tablet Take 2 tablets (650 mg) by mouth every 4 hours as needed for mild pain   albuterol (PROAIR HFA/PROVENTIL HFA/VENTOLIN HFA) 108 (90 Base) MCG/ACT inhaler Inhale 2 puffs into the lungs every 6 hours as needed for shortness of breath / dyspnea or wheezing   amLODIPine (NORVASC) 5 MG tablet Take 1 tablet (5 mg) by mouth daily   aspirin (ASA) 81 MG chewable tablet Take 1 tablet (81 mg) by mouth daily   balsalazide (COLAZAL) 750 MG capsule TAKE 3 CAPSULES BY MOUTH TWICE DAILY   calcium carbonate 600 mg-vitamin D 400 units (CALTRATE) 600-400 MG-UNIT per tablet Take 1 tablet by mouth 2 times daily (with meals)   cetirizine (ZYRTEC) 10 MG tablet Take 10 mg by mouth daily   DULoxetine (CYMBALTA) 20 MG EC capsule Take 20 mg by mouth daily   fluticasone (FLONASE) 50 MCG/ACT nasal spray Spray 1 spray into both nostrils 2 times daily as needed    fluticasone (FLOVENT HFA) 220 MCG/ACT Inhaler Inhale 2 puffs into the lungs 2 times daily   levothyroxine (SYNTHROID/LEVOTHROID) 100 MCG tablet Take 100 mcg by mouth daily    magnesium oxide (MAG-OX) 400 MG tablet Take 1 tablet (400 mg) by mouth 2 times daily   melatonin 3 MG tablet Take 2 tablets (6 mg) by mouth At Bedtime (Patient taking differently: Take 5 mg by mouth At Bedtime )   montelukast (SINGULAIR) 10 MG tablet Take 1 tablet (10 mg) by mouth At Bedtime   multivitamin w/minerals (THERA-VIT-M) tablet Take 1 tablet by mouth daily   mycophenolate (GENERIC EQUIVALENT) 250 MG capsule Take 6 capsules (1,500 mg) by mouth 2 times daily   omeprazole (PRILOSEC) 40 MG DR capsule Take 1 capsule (40 mg) by mouth daily   potassium chloride ER  "(K-DUR/KLOR-CON M) 20 MEQ CR tablet Take 1 tablet (20 mEq) by mouth daily   rosuvastatin (CRESTOR) 10 MG tablet Take 1 tablet (10 mg) by mouth daily   senna (SENOKOT) 8.6 MG tablet Take 2 tablets by mouth 2 times daily as needed for constipation   tacrolimus (GENERIC EQUIVALENT) 1 MG capsule Take 2 mg by mouth  in the morning and  2 mg  in the evening (Patient taking differently: Take 2 mg by mouth  in the morning and  3 mg  in the evening)   tacrolimus (GENERIC EQUIVALENT) 0.5 MG capsule Take twice a day (for a total of 2.5 mg bid). (Patient not taking: Reported on 7/17/2019)     Current Facility-Administered Medications on File Prior to Visit:  [COMPLETED] lidocaine (LMX4) cream   pentamidine (NEBUPENT) neb solution 300 mg       ROS:  See HPI    EXAM:  /69 (BP Location: Left arm, Patient Position: Chair, Cuff Size: Adult Regular)   Pulse 110   Ht 1.727 m (5' 8\")   Wt 60.1 kg (132 lb 9.6 oz)   SpO2 100%   BMI 20.16 kg/m     GENERAL: Appears comfortable, in no acute distress, thin.   HEENT: Eye symmetrical, no discharge or icterus bilaterally. No thrush.   CV: Tachycardic and regular, +S1S2, no murmur, rub, or gallop. JVP <10cm   RESPIRATORY: Respirations regular, even, and unlabored. Lungs CTA throughout, good air movement.  GI: Soft, thin and non distended with normoactive bowel sounds present in all quadrants. No tenderness, rebound, guarding. No hepatomegaly.   EXTREMITIES: Np peripheral edema. 2+ bilateral pedal pulses.   NEUROLOGIC: Alert and oriented x 3. No focal deficits.   MUSCULOSKELETAL: No joint swelling or tenderness.   SKIN: No jaundice. Thin skin noted with small skin tears on hands. Acne consistent with steroid use.       Labs - to be drawn after clinic given issue with PICC line  Labs from Wednesday  CBC RESULTS:  Lab Results   Component Value Date    WBC 4.9 07/17/2019    RBC 3.38 (L) 07/17/2019    HGB 9.3 (L) 07/17/2019    HCT 31.0 (L) 07/17/2019    MCV 92 07/17/2019    MCH 27.5 " 07/17/2019    MCHC 30.0 (L) 07/17/2019    RDW 13.7 07/17/2019     07/17/2019       CMP RESULTS:  Lab Results   Component Value Date     07/17/2019    POTASSIUM 4.2 07/17/2019    CHLORIDE 103 07/17/2019    CO2 24 07/17/2019    ANIONGAP 8 07/17/2019    GLC 80 07/17/2019    BUN 17 07/17/2019    CR 1.07 07/17/2019    GFRESTIMATED 77 07/17/2019    GFRESTBLACK 89 07/17/2019    RADAMES 8.9 07/17/2019    BILITOTAL 0.5 06/26/2019    ALBUMIN 3.8 06/26/2019    ALKPHOS 160 (H) 06/26/2019    ALT 15 06/26/2019    AST 21 06/26/2019        INR RESULTS:  Lab Results   Component Value Date    INR 1.22 (H) 05/08/2019       LIPID RESULTS:  Lab Results   Component Value Date    CHOL 204 (H) 05/22/2019    HDL 85 05/22/2019    LDL 84 05/22/2019    TRIG 175 (H) 05/22/2019       IMMUNOSUPPRESSANT LEVELS:  Lab Results   Component Value Date    TACROL 8.2 06/26/2019    DOSTAC 9:30PM ON 6/25/19 06/26/2019       No components found for: CK  Lab Results   Component Value Date    MAG 1.7 07/17/2019     Lab Results   Component Value Date    A1C 5.7 (H) 02/23/2019     Lab Results   Component Value Date    PHOS 4.0 07/17/2019     Lab Results   Component Value Date    NTBNP 10,986 (H) 11/15/2018     Lab Results   Component Value Date    SAITESTMET [facility] 05/22/2019    SAICELL Class I 05/22/2019    [id number] None 05/22/2019    [id number] None 05/22/2019    SAIREPCOM  05/22/2019      Test performed by modified procedure. Serum heat inactivated and tested   by a modified ([place]) protocol including fetal calf serum addition.   High-risk, mfi >3,000. Mod-risk, mfi 500-3,000.       Lab Results   Component Value Date    SAIITESTME [facility] 05/22/2019    SAIICELL Class II 05/22/2019    [id number] None 05/22/2019    [id number] None 05/22/2019    SAIIREPCOM  05/22/2019      Test performed by modified procedure. Serum heat inactivated and tested   by a modified ([place]) protocol including fetal calf serum addition.   High-risk, mfi >3,000. Mod-risk, mfi  500-3,000.       Lab Results   Component Value Date    CSPEC EDTA PLASMA 06/26/2019       Diagnostic Studies:    RHC 5/22/19  RA: A-wave: 8 mmHg, V-wave: 7 mmHg; Mean: 5 mmHg   RV: Systolic: 37 mmHg, End Diastolic: 7 mmHg  PA: Systolic:37 mmHg, Diasotlic: 20 mmHg. Mean: 27 mmHg  PCW: A-wave: 30 mmHg, V-wave: 26 mmHg, Mean: 18 mmHg  Cardiac Output: CO Jaden: 5.45 L/min, CI Jaden: 3.31 L/min/m2; CO TD: 5.53 L/min, CI TD: 3.36 L/min/m2  BP: 161 mmHg / 105 mmHg  SpO2: 99 %  PA Stat: 66.5 %    Echo 5/22/19  Mildly (EF 40-45%) reduced left ventricular function is present. Mild diffuse  hypokinesis is present.  Global right ventricular function is mildly reduced.  No significant valvular abnormalities noted.  This study was compared with the study from 3/26/2019: LV function has  decreased.      RHC 4/24/19  RA  Mean: 15 mmHg     PA  Systolic:47 mmHg  Diasotlic: 25 mmHg  Mean: 35 mmHg    PCW  Mean: 23 mmHg    Cardiac Output  CO Jaden: 4.73 L/min  CI Jaden: 2.91 L/min/m2  CO TD: 4.37 L/min  CI TD: 2.68 L/min/m2    TTE 3/26/19  Moderate concentric wall thickening consistent with left ventricular  hypertrophy is present.  Global and regional left ventricular function is normal with an EF of 60-65%.  Right ventricle is moderately dilated with hypertrophy and systolic  dysfunction that is slightly worse than on 3/5/19.  IVC is dilated and RA pressure estimated 15 mmHg    Assessment/Plan:  Mr. Larios is a 54 year old male who is s/p orthotopic heart transplant on 2/24/19 c/b RV dysfunction requiring IABP and FRANKLIN requiring short term HD who presents to clinic for routine follow-up.       # Status post OHT on 2/24/19, history of NICM  # Chronic immunosuppression  *No biopsies or central line access on left due to SVC graft     Immunosuppression:   --continue Cellcept to 1500 mg BID as above  --continue tacrolimus goal 8-10, follow-up level and adjust as indicated     Prophylaxis:   --CAV: aspirin 81 mg, rosuvastatin 10 mg  daily  --PCP: schedule pentamidine  --GI: omperazole 40 mg   --CMV: D+/R+, completed 3 months of valcyte post-transplant  --Osteoporosis: calcium/vitamin D      Serostatus: CMV: D+/R+. EBV: D+/R+     #HTN  - stable continue current anti-hypertensive regimen  - Encouraged patient to begin regular aerobic exercise aiming for at least 150 minutes of moderate physical activity or 75 minutes of vigorous physical activity - or an equal combination of both - each week. and follow low-salt, heart healthy diet.          Follow-up:  6 month post heart transplant follow up on 8/13. Rhc, hbx, angio, and labs scheduled. Dr. Shukla (infectious disease f/u scheduled on 9/3).       Corinna Donis MD  Section Head - Advanced Heart Failure, Transplantation and Mechanical Circulatory Support  Director - Adult Congenital and Cardiovascular Genetics Center  Associate Professor of Medicine, [facility]

## 2019-07-17 NOTE — PATIENT INSTRUCTIONS
Recommendations from today's MTM visit:                                                      1. Start Tylenol Arthritis 650mg ER 1-2 tablets three times daily. Stop your regular Tylenol when you start this. Max dose 3000-4000mg.     2. Follow up at [facility] concerning your Balsalazide treatment.     3. Talk to the transplant team today about...   -Scheduling Pentamidine.    -Stopping Potassium, your levels have been normal.   -Drawing an uric acid level. Follow up PCP for gout management if this is the cause of your joint pains.     4. Start applying lotion (I recommend Eucerin) after showers/ before bed. See if this improves your itching.     Next MTM visit: 1 year post transplant.     To schedule another MTM appointment, please call the clinic directly or you may call the MTM scheduling line at [phone] or toll-free at [phone].     My Clinical Pharmacist's contact information:                                                      It was a pleasure talking with you today!  Please feel free to contact me with any questions or concerns you have.      Franko Preciado, PharmD  MTM Pharmacist    Phone: [phone]     You may receive a survey about the MTM services you received by email and/or US Mail.  I would appreciate your feedback to help me serve you better in the future. Your comments will be anonymous.

## 2019-07-17 NOTE — PROGRESS NOTES
"Admission~2240    Diagnosis:Elevated Cr  Admitted from:A direct admit from home  Via: Walked  Accompanied by: Alone  Belongings: Kept with pt.  Admission Profile: not completed. Pt upset and angry that he is here. He said, \" I want to sleep\"   Teaching: orientation to unit, call don't fall, use of console, meal times, visiting hours, when to call for the RN (angina/sob/dizziness, etc.), and enforced importance of safety   Access: Pt came with PICC infusing Cefepime 8gm in NS infusing 0.4ml/hr   Telemetry: Placed on patient  Height/Weight: Completed    Pt is ALOx4. He is very angry and upset that he is here. He said that he is tired of being in the hospital again.  Pt wants to be left alone. Unable to ask admission questions at this time.    "
"D:BP (!) 138/97 (BP Location: Left arm, Cuff Size: Adult Regular)   Pulse 94   Temp 98.2  F (36.8  C) (Oral)   Resp 18   Ht 1.727 m (5' 8\")   Wt 56.7 kg (125 lb)   SpO2 98%   BMI 19.01 kg/m  RA Refused VS at 0400 or     I: Monitored vitals and assessed pt status  Change: Pt angry and refused cares when bothered.  Running:LR 1L given,Cefephime running now.  PRN: Denies pain or nausea.    A: A/O x 4. VSS. Afebrile when he lets you take VS.    P: Continue to monitor pt status and report changes to treatment team. Draw TAC level before meds given.Has own CADD pump in room that was stopped.Work with pt to know plan for today.  "
"D:Pt had high BP of 148/100 and MD notified of this.Refused to be bothered during the night BP (!) 135/97 (BP Location: Left arm, Cuff Size: Adult Regular)   Pulse 94   Temp 98.3  F (36.8  C) (Oral)   Resp 18   Ht 1.727 m (5' 8\")   Wt 57.2 kg (126 lb 1.6 oz)   SpO2 99%   BMI 19.17 kg/m  RA this am.    I: Monitored vitals and assessed pt status  Change: BP improved this am.  Running: Nothing  PRN: denied pain or nausea during the night.    A: A/O x 4. BP elevated. Afebrile.    P: Continue to monitor pt status and report changes to treatment team. Patient hopes to go home today but will depends on labs.  "
DISCHARGE            5/26/2019  5:20 PM  ----------------------------------------------------------------------------  Discharged to: Home  Via: Automobile, pt going to  pt car in [facility]  Accompanied by: Self  Discharge Instructions: diet, activity, medications, follow up appointments, when to call the MD, aftercare instructions, and what to watchout for (i.e. As described in discharge paperwork)  Prescription: To be filled by [facility]; medication list reviewed & sent with pt  Follow Up Appointments: arranged; information given  Belongings: Sent with pt  PICC: left in place, blood return eventually noted after given tpa  Telemetry: off  Pt exhibits understanding of above discharge instructions; all questions answered.    Writer reviewed discharge paperwork with pt. Pt exhibited signs of understanding. Pt declined having writer review going home with an abx.    Discharge Paperwork: Signed, copied, and sent home with patient.     
Pt has been very upset since he came to his room.He has been yelling at this nurse and swearing and wants to be left alone.He yelled that he took his evening pills already.Explained that MD ordered IV antibiotics and wanted his CADD pump unhooked and was not happy about it.IM already getting them and wants to be left alone.IV LR is running and he is upset if beeping.I should of stayed at home.Would not answer anymore questions at this time.  
Pt walked up with his belomgings and in room without any report and came from clinic today with increased creatnine and came from ED.Denies pain or SOB.  
How Severe Is It?: mild
Is This A New Presentation, Or A Follow-Up?: Rash

## 2019-07-17 NOTE — PROGRESS NOTES
SUBJECTIVE/OBJECTIVE:                           Everton Larios is a 54 year old male coming in for a follow up visit for Medication Therapy Management.  Referred by txp team.      Chief Complaint: 5 months post txp. Pt complains of being more tired lately, a lot of itching, a lot muscle/joint aches (ankles, toes, hands, knees)     Allergies/ADRs: Reviewed in Epic  Tobacco: No tobacco use  Alcohol: not currently using  Caffeine: 2 cups/day of coffee  Activity: Compression fracture in vertabrae. Starting Cardiac rehab soon.   PMH: Reviewed in [facility], last UC flare in 2005.    Medication Adherence/Access:  Patient using pill empty aspirin bottles to set up his medications. He prefers this method.   Patient takes medications 4 time(s) per day. 8:30am and 8:30pm, magnesium in the middle of the day.   Per patient, misses medication 0 times per week.   The patient fills medications at [facility]: NO, fills medications at [facility]    Heart Transplant:  Current immunosuppressants include TAC 2mg qAM, 3mg qPM, MMF 1500mg BID.  Pt reports no side effects  Transplant date: 2/24/19  Estimated Creatinine Clearance: 63.8 mL/min (based on SCr of 1.07 mg/dL).  CMV prophylaxis: Completed   PCP prophylaxis: Pentamidine, last dose 6/18/19  Current supplements for electrolyte replacement: potassium 20mEq daily, Mag Oxide 400mg BID, Calcium Carbonate BID (ice cream in the evening, sometimes milk)  Magnesium   Date Value Ref Range Status   07/17/2019 1.7 1.6 - 2.3 mg/dL Final   Tx Coordinator: Jose Arreguin MD: Dr. Corinna Donis, Using Med Card: No, using smaller cards that fit in his wallet.   Immunizations: annual flu shot UTD; [id number]:  Due 6 months post txp; Prevnar 13: 2018; TDaP:  2012; Shingrix: Due 6 months post txp    Pruritis: Pt been experiencing itching since transplant.     Hypertension: Current medications include Amlodipine 5mg daily, Aspirin 81mg daily No bruising bleed/ .  Patient does self-monitor BP.  AMs:117/85. This morning systolic 106.   Patient reports no current medication side effects. Does sometimes have Edema in one ankle or the other.   BP Readings from Last 3 Encounters:   07/17/19 143/75   06/18/19 130/89   06/04/19 (!) 142/98     Hyperlipidemia: Current therapy includes Rosuvastatin 10mg once daily.  Pt reports no significant myalgias or other side effects.  The 10-year ASCVD risk score (Zenaida BRADSHAW JrRicci, et al., 2013) is: 5.7%    Values used to calculate the score:      Age: 54 years      Sex: Male      Is Non- : No      Diabetic: No      Tobacco smoker: No      Systolic Blood Pressure: 143 mmHg      Is BP treated: Yes      HDL Cholesterol: 85 mg/dL      Total Cholesterol: 204 mg/dL    GERD/ Pierce's: Current medications include: Prilosec (omeprazole) 40mg daily. Pt c/o no current symptoms.  Patient feels that current regimen is effective.    Constipation: Pt is taking Miralax 17g up to twice daily prn.   Pt states MIralax works better at keeping his stools consistent without inducing diarrhea.    Joint pain: Pt complains of near constant pain in toes, hands, knees, and ankles. Ankles are the worse at 5-6/10. Unclear cause of this pain. Pt is taking APAP 1000mg 2-3x daily, this does reduce the pain and he notices when he misses a dose.     Allergic rhinitis: Current medications include Cetirizine 10mg daily, Montelukast 10mg daily (pt also has asthma). Primary symptoms are nasal congestion and post-nasal drip, but Cetirizine has controlled these symptoms.     UC: Pt is taking Balsalazide 750mg BID. More dealing with constipation now. No UC issues per patient. Managed by Dr. Claudio Velrade at [facility].     Asthma: Current asthma medications: Albuterol prn, ICS - Flovent 2 puff(s) twice daily prn. Pt rinses their mouth after using steroid inhaler. Has not been using Flovent frequently. Denies needing rescue inhaler or waking up at night coughing/ SOB. Denies limiting ADL.     Today's  Vitals: There were no vitals taken for this visit.    ASSESSMENT:                             Current medications were reviewed today.     Medication Adherence: No issues.     Heart Transplant:  Stable. Potassium has been normal for the past month- unclear of supplementation is still necessary. Due for Pentamidine neb.     Pruritis: Recommended patient use Eucerin cream, see if this improves pruritis.     Hypertension: No changes.     Hyperlipidemia: No changes.    GERD/ Pierce's: Continue PPI due to Pierce's history.     Constipation: Stable.     Joint pain: Pt to start Tylenol Arthritis ER 650mg 1-2 tablets TID for pain. Recommended he follow up with PCP Concerning these joint paints. Uric acid has been elevated in the past, but patient does not endorse swelling.     Allergic rhinitis: Stable.     UC: Pt due for GI follow up.     Asthma: Stable. Asthma seems well controlled despite not using controller inhaler correctly.     PLAN:                            Pt to....  1. Start Tylenol Arthritis 650mg ER 1-2 tablets three times daily. Stop your regular Tylenol when you start this. Max dose 3000-4000mg.   2. Follow up at [facility] concerning your Balsalazide treatment.   3. Talk to the transplant team today about...   -Scheduling Pentamidine.    -Stopping Potassium, your levels have been normal. txp team prefers to continue this.    -Drawing an uric acid level. Follow up PCP for gout management if this is the cause of your joint pains.   4. Start applying lotion (I recommend Eucerin) after showers/ before bed. See if this improves your itching.     I spent 50 minutes with this patient today. I offer these suggestions for consideration by txp team. A copy of the visit note was provided to the patient's referring provider.    Will follow up in 6 months.    The patient was given a summary of these recommendations as an after visit summary.     Franko Preciado, PharmD  [facility] Pharmacist    Phone: [phone]

## 2019-07-17 NOTE — Clinical Note
dry, intact, no bleeding and no hematoma. 7fr RIJ removed, manual pressure applied, hemostasis achieved, bandage placed

## 2019-07-17 NOTE — PROGRESS NOTES
[facility]   Interventional Cardiology        Consenting/Education for Endomyocardial Biopsy     Patient understands during the endomyocardial biopsy, a catheter is placed in your neck/groin vein, a  Biopsy forceps  or pincers at the end of the catheter are used to take the tissue samples. This is may be repeated to get enough samples. The biopsy pieces are very small (one to two millimeters).     Patient also understands risks and complications of the procedure which include, but are not limited to bruising/swelling around the incision site, infection, bleeding, allergic reaction to local anesthetic, air embolism, arterial puncture, stroke, heart attack.      Patient verbalized understanding of risks and benefits of the endomyocardial biopsy and has elected to proceed with the procedure.         Stevie Alejo PA-C  [facility] Cardiology Team

## 2019-07-17 NOTE — NURSING NOTE
Chief Complaint   Patient presents with     New Patient     new     Vitals were taken and medications were reconciled.    Nicole Camp  3:08 PM

## 2019-07-17 NOTE — PROGRESS NOTES
ADULT HEART TRANSPLANT CLINIC    HPI:   Mr. Larios is a 54 year old male who presents to clinic today for routine heart transplant follow-up. Patient has history of ASD (s/p repair in childhood), AFib/AF (s/p ablation), NICM, diastolic dysfunction, alcohol abuse, Pierce's esophagus, ulcerative colitis, GIB (s/p splenic embolization), and hypothyroidism, now s/p orthotopic heart transplant and bypass of persistent left SVC to right atrial appendage 2/24/19. Postop course c/b shock due to RV dysfunction requiring IABP, small pneumoperitoneum, chest wall hematoma (former ICD site, s/p evacuation and drain placement 3/31/19), HCAP/klebsiella pneumonia, and FRANKLIN (required short-term HD, now recovered). He was transferred to rehab 4/3, and ultimately discharged 4/15. Biopsy 3/25/19 showed mild AMR1 with some staining for c4d. Treatment deferred and repeat biopsy 3/28 showed improved AMR and less reactive capillary staining, and subsequent biopsies have now shown resolution of AMR and improved capillary staining.  Baseline coronary angiogram has been deferred due to renal function. Last echo 3/26 showed moderate concentric wall thickening c/w LVH, with LVEF 60-65% and moderately dilated RV with mild-moderate RV dysfunction. He had a fall in early April and found to have compression fracture. Most recently, patient admitted 4/28-5/9 for fevers, URI symptoms, abdominal pain and diarrhea. Found to be neutropenia with pseudomonas bacteremia. CT chest revealed diffuse bilateral solid with peripheral ground glass pulmonary nodules/opacities and mediastinal adenopathy, bronch with BAL grew pseudomonas. Transplant ID was consulted. fungitell positive so concern for fungal component as well. He was discharged on IV cefepime and micafungin which he has since completed .       Patient reports that overall he has feeling well.  He has had no further fever, chills, nausea, vomiting, or diarrhea. Denies any exertional chest pain,  shortness of breath, fatigue, lightheadedness, orthopnea, PND, or edema.  He also denies any chest pain or pressure, sob/andrade, orthopnea, pnd, palpitations, syncope/presyncope or marielle.  He also denies any problems with his medications.      PAST MEDICAL HISTORY:  Past Medical History:   Diagnosis Date     Alcohol abuse      Pierce's esophagus 10/4/2018     Chronic rhinitis 10/4/2018     Chronic systolic heart failure (H) 10/4/2018     Depression 10/4/2018     Diastolic dysfunction      DJD (degenerative joint disease) - neck 10/4/2018     H/O congenital atrial septal defect (ASD) repair at age 3 10/4/2018     Hypothyroidism due to medication 10/4/2018     ICD, [place] 2008; gen change 2/2018 10/4/2018     Intermittent asthma without complication 10/4/2018     Nonischemic cardiomyopathy (H) 10/4/2018     On amiodarone therapy 10/4/2018     Paroxysmal atrial fibrillation (H) 10/4/2018     S/P ablation of atrial fibrillation 10/4/2018     Systolic heart failure (H)      Ulcerative colitis (H) 10/4/2018     Ventricular tachycardia (H) 10/4/2018       FAMILY HISTORY:  Family History   Problem Relation Age of Onset     Endometrial Cancer Mother      Hypertension Father      Endometrial Cancer Maternal Grandmother        SOCIAL HISTORY:  Socioeconomic History     Marital status: Single   Tobacco Use     Smoking status: Former Smoker     Years: 10.00     Smokeless tobacco: Never Used   Substance and Sexual Activity     Alcohol use: Yes     Alcohol/week: 0.6 oz     Types: 1 Cans of beer per week     Comment: a couple times a week      Drug use: No   Social History Narrative    Everton is a retired . He lives by himself in a townhouse in [place]. He has no lawn care responsibilities. He will be staying with his folks during his post-hospital early transplant care time. No history of TB exposures.        CURRENT MEDICATIONS:    Current Outpatient Medications on File Prior to Visit:  acetaminophen  (TYLENOL) 325 MG tablet Take 2 tablets (650 mg) by mouth every 4 hours as needed for mild pain   albuterol (PROAIR HFA/PROVENTIL HFA/VENTOLIN HFA) 108 (90 Base) MCG/ACT inhaler Inhale 2 puffs into the lungs every 6 hours as needed for shortness of breath / dyspnea or wheezing   amLODIPine (NORVASC) 5 MG tablet Take 1 tablet (5 mg) by mouth daily   aspirin (ASA) 81 MG chewable tablet Take 1 tablet (81 mg) by mouth daily   balsalazide (COLAZAL) 750 MG capsule TAKE 3 CAPSULES BY MOUTH TWICE DAILY   calcium carbonate 600 mg-vitamin D 400 units (CALTRATE) 600-400 MG-UNIT per tablet Take 1 tablet by mouth 2 times daily (with meals)   cetirizine (ZYRTEC) 10 MG tablet Take 10 mg by mouth daily   DULoxetine (CYMBALTA) 20 MG EC capsule Take 20 mg by mouth daily   fluticasone (FLONASE) 50 MCG/ACT nasal spray Spray 1 spray into both nostrils 2 times daily as needed    fluticasone (FLOVENT HFA) 220 MCG/ACT Inhaler Inhale 2 puffs into the lungs 2 times daily   levothyroxine (SYNTHROID/LEVOTHROID) 100 MCG tablet Take 100 mcg by mouth daily    magnesium oxide (MAG-OX) 400 MG tablet Take 1 tablet (400 mg) by mouth 2 times daily   melatonin 3 MG tablet Take 2 tablets (6 mg) by mouth At Bedtime (Patient taking differently: Take 5 mg by mouth At Bedtime )   montelukast (SINGULAIR) 10 MG tablet Take 1 tablet (10 mg) by mouth At Bedtime   multivitamin w/minerals (THERA-VIT-M) tablet Take 1 tablet by mouth daily   mycophenolate (GENERIC EQUIVALENT) 250 MG capsule Take 6 capsules (1,500 mg) by mouth 2 times daily   omeprazole (PRILOSEC) 40 MG DR capsule Take 1 capsule (40 mg) by mouth daily   potassium chloride ER (K-DUR/KLOR-CON M) 20 MEQ CR tablet Take 1 tablet (20 mEq) by mouth daily   rosuvastatin (CRESTOR) 10 MG tablet Take 1 tablet (10 mg) by mouth daily   senna (SENOKOT) 8.6 MG tablet Take 2 tablets by mouth 2 times daily as needed for constipation   tacrolimus (GENERIC EQUIVALENT) 1 MG capsule Take 2 mg by mouth  in the morning and  " 2 mg  in the evening (Patient taking differently: Take 2 mg by mouth  in the morning and  3 mg  in the evening)   tacrolimus (GENERIC EQUIVALENT) 0.5 MG capsule Take twice a day (for a total of 2.5 mg bid). (Patient not taking: Reported on 7/17/2019)     Current Facility-Administered Medications on File Prior to Visit:  [COMPLETED] lidocaine (LMX4) cream   pentamidine (NEBUPENT) neb solution 300 mg       ROS:  See HPI    EXAM:  /69 (BP Location: Left arm, Patient Position: Chair, Cuff Size: Adult Regular)   Pulse 110   Ht 1.727 m (5' 8\")   Wt 60.1 kg (132 lb 9.6 oz)   SpO2 100%   BMI 20.16 kg/m    GENERAL: Appears comfortable, in no acute distress, thin.   HEENT: Eye symmetrical, no discharge or icterus bilaterally. No thrush.   CV: Tachycardic and regular, +S1S2, no murmur, rub, or gallop. JVP <10cm   RESPIRATORY: Respirations regular, even, and unlabored. Lungs CTA throughout, good air movement.  GI: Soft, thin and non distended with normoactive bowel sounds present in all quadrants. No tenderness, rebound, guarding. No hepatomegaly.   EXTREMITIES: Np peripheral edema. 2+ bilateral pedal pulses.   NEUROLOGIC: Alert and oriented x 3. No focal deficits.   MUSCULOSKELETAL: No joint swelling or tenderness.   SKIN: No jaundice. Thin skin noted with small skin tears on hands. Acne consistent with steroid use.       Labs - to be drawn after clinic given issue with PICC line  Labs from Wednesday  CBC RESULTS:  Lab Results   Component Value Date    WBC 4.9 07/17/2019    RBC 3.38 (L) 07/17/2019    HGB 9.3 (L) 07/17/2019    HCT 31.0 (L) 07/17/2019    MCV 92 07/17/2019    MCH 27.5 07/17/2019    MCHC 30.0 (L) 07/17/2019    RDW 13.7 07/17/2019     07/17/2019       CMP RESULTS:  Lab Results   Component Value Date     07/17/2019    POTASSIUM 4.2 07/17/2019    CHLORIDE 103 07/17/2019    CO2 24 07/17/2019    ANIONGAP 8 07/17/2019    GLC 80 07/17/2019    BUN 17 07/17/2019    CR 1.07 07/17/2019    " GFRESTIMATED 77 07/17/2019    GFRESTBLACK 89 07/17/2019    RADAMES 8.9 07/17/2019    BILITOTAL 0.5 06/26/2019    ALBUMIN 3.8 06/26/2019    ALKPHOS 160 (H) 06/26/2019    ALT 15 06/26/2019    AST 21 06/26/2019        INR RESULTS:  Lab Results   Component Value Date    INR 1.22 (H) 05/08/2019       LIPID RESULTS:  Lab Results   Component Value Date    CHOL 204 (H) 05/22/2019    HDL 85 05/22/2019    LDL 84 05/22/2019    TRIG 175 (H) 05/22/2019       IMMUNOSUPPRESSANT LEVELS:  Lab Results   Component Value Date    TACROL 8.2 06/26/2019    DOSTAC 9:30PM ON 6/25/19 06/26/2019       No components found for: CK  Lab Results   Component Value Date    MAG 1.7 07/17/2019     Lab Results   Component Value Date    A1C 5.7 (H) 02/23/2019     Lab Results   Component Value Date    PHOS 4.0 07/17/2019     Lab Results   Component Value Date    NTBNP 10,986 (H) 11/15/2018     Lab Results   Component Value Date    SAITESTMET [facility] 05/22/2019    SAICELL Class I 05/22/2019    [id number] None 05/22/2019    [id number] None 05/22/2019    SAIREPCOM  05/22/2019      Test performed by modified procedure. Serum heat inactivated and tested   by a modified ([place]) protocol including fetal calf serum addition.   High-risk, mfi >3,000. Mod-risk, mfi 500-3,000.       Lab Results   Component Value Date    SAIITESTME [facility] 05/22/2019    SAIICELL Class II 05/22/2019    [id number] None 05/22/2019    [id number] None 05/22/2019    SAIIREPCOM  05/22/2019      Test performed by modified procedure. Serum heat inactivated and tested   by a modified ([place]) protocol including fetal calf serum addition.   High-risk, mfi >3,000. Mod-risk, mfi 500-3,000.       Lab Results   Component Value Date    CSPEC EDTA PLASMA 06/26/2019       Diagnostic Studies:    C 5/22/19  RA: A-wave: 8 mmHg, V-wave: 7 mmHg; Mean: 5 mmHg   RV: Systolic: 37 mmHg, End Diastolic: 7 mmHg  PA: Systolic:37 mmHg, Diasotlic: 20 mmHg. Mean: 27 mmHg  PCW: A-wave: 30 mmHg, V-wave: 26 mmHg, Mean: 18  mmHg  Cardiac Output: CO Jaden: 5.45 L/min, CI Jaden: 3.31 L/min/m2; CO TD: 5.53 L/min, CI TD: 3.36 L/min/m2  BP: 161 mmHg / 105 mmHg  SpO2: 99 %  PA Stat: 66.5 %    Echo 5/22/19  Mildly (EF 40-45%) reduced left ventricular function is present. Mild diffuse  hypokinesis is present.  Global right ventricular function is mildly reduced.  No significant valvular abnormalities noted.  This study was compared with the study from 3/26/2019: LV function has  decreased.      RHC 4/24/19  RA  Mean: 15 mmHg     PA  Systolic:47 mmHg  Diasotlic: 25 mmHg  Mean: 35 mmHg    PCW  Mean: 23 mmHg    Cardiac Output  CO Jaden: 4.73 L/min  CI Jaden: 2.91 L/min/m2  CO TD: 4.37 L/min  CI TD: 2.68 L/min/m2    TTE 3/26/19  Moderate concentric wall thickening consistent with left ventricular  hypertrophy is present.  Global and regional left ventricular function is normal with an EF of 60-65%.  Right ventricle is moderately dilated with hypertrophy and systolic  dysfunction that is slightly worse than on 3/5/19.  IVC is dilated and RA pressure estimated 15 mmHg    Assessment/Plan:  Mr. Larios is a 54 year old male who is s/p orthotopic heart transplant on 2/24/19 c/b RV dysfunction requiring IABP and FRANKLIN requiring short term HD who presents to clinic for routine follow-up.       # Status post OHT on 2/24/19, history of NICM  # Chronic immunosuppression  *No biopsies or central line access on left due to SVC graft     Immunosuppression:   --continue Cellcept to 1500 mg BID as above  --continue tacrolimus goal 8-10, follow-up level and adjust as indicated     Prophylaxis:   --CAV: aspirin 81 mg, rosuvastatin 10 mg daily  --PCP: schedule pentamidine  --GI: omperazole 40 mg   --CMV: D+/R+, completed 3 months of valcyte post-transplant  --Osteoporosis: calcium/vitamin D      Serostatus: CMV: D+/R+. EBV: D+/R+     #HTN  - stable continue current anti-hypertensive regimen  - Encouraged patient to begin regular aerobic exercise aiming for at least 150  minutes of moderate physical activity or 75 minutes of vigorous physical activity - or an equal combination of both - each week. and follow low-salt, heart healthy diet.         Follow-up:  6 month post heart transplant follow up on 8/13. Rhc, hbx, angio, and labs scheduled. Dr. Shukla (infectious disease f/u scheduled on 9/3).       Corinna Donis MD  Section Head - Advanced Heart Failure, Transplantation and Mechanical Circulatory Support  Director - Adult Congenital and Cardiovascular [facility]etics Center  Associate Professor of Medicine, [facility]

## 2019-07-17 NOTE — PROGRESS NOTES
1255 Pt arrived on 2a post heart biopsy. VSS RA. Dressing c/d/i. No pain. 1300 Pt eating and drinking. 1320 Pt tolerating oral intake. Declines discharge instructions due to multiple heart biopsies. Pt discharged at this time.

## 2019-07-17 NOTE — PATIENT INSTRUCTIONS
Patient instructions:   1. No changes today.   2. I will call you with all pending lab results today.   3. Keep taking potassium as discussed.   4. I will complete paperwork and send it back to you.   5. Pentamidine neb scheduled for 7/24 at 1200    Return to clinic for 6 month post heart transplant follow up on 8/13. Rhc, hbx, angio, and labs scheduled. Dr. Shukla (infectious disease f/u scheduled on 9/3).     Jocelynn Jerez RN BSN   Post Heart Transplant Nurse Coordinator  [facility]  Questions: [phone]

## 2019-07-18 ENCOUNTER — TELEPHONE (OUTPATIENT)
Dept: TRANSPLANT | Facility: CLINIC | Age: 54
End: 2019-07-18

## 2019-07-18 ENCOUNTER — AMBULATORY - HEALTHEAST (OUTPATIENT)
Dept: CARDIAC REHAB | Facility: HOSPITAL | Age: 54
End: 2019-07-18

## 2019-07-18 DIAGNOSIS — Z94.1 STATUS POST HEART TRANSPLANTATION (H): ICD-10-CM

## 2019-07-18 DIAGNOSIS — Z94.1 TRANSPLANTED HEART (H): ICD-10-CM

## 2019-07-18 LAB — COPATH REPORT: NORMAL

## 2019-07-18 RX ORDER — TACROLIMUS 0.5 MG/1
CAPSULE ORAL
Qty: 90 CAPSULE | Refills: 3 | Status: ON HOLD | OUTPATIENT
Start: 2019-07-18 | End: 2019-09-28

## 2019-07-18 RX ORDER — TACROLIMUS 1 MG/1
3 CAPSULE ORAL 2 TIMES DAILY
Qty: 360 CAPSULE | Refills: 3 | Status: SHIPPED | OUTPATIENT
Start: 2019-07-18 | End: 2019-08-02

## 2019-07-18 NOTE — TELEPHONE ENCOUNTER
tacro 5.3. Goal 8-10. Good 12 hour trough per patient. Current dose 2/3. Instructions to increase to 3/3 (pt refusing to increase in increments of 0.5 because he can't afford 0.5 capsules at this time). Repeat a level on 7/24 while here for pentamidine neb.

## 2019-07-22 ENCOUNTER — AMBULATORY - HEALTHEAST (OUTPATIENT)
Dept: CARDIAC REHAB | Facility: HOSPITAL | Age: 54
End: 2019-07-22

## 2019-07-22 DIAGNOSIS — Z94.1 STATUS POST HEART TRANSPLANTATION (H): ICD-10-CM

## 2019-07-24 DIAGNOSIS — A41.52 SEPSIS DUE TO PSEUDOMONAS AERUGINOSA (H): ICD-10-CM

## 2019-07-24 DIAGNOSIS — Z94.1 HEART REPLACED BY TRANSPLANT (H): ICD-10-CM

## 2019-07-24 DIAGNOSIS — D72.9 ABNORMAL WHITE BLOOD CELL (WBC) COUNT: Primary | ICD-10-CM

## 2019-07-24 DIAGNOSIS — Z94.1 TRANSPLANTED HEART (H): ICD-10-CM

## 2019-07-24 DIAGNOSIS — D72.9 ABNORMAL WHITE BLOOD CELL (WBC) COUNT: ICD-10-CM

## 2019-07-24 LAB
ALBUMIN SERPL-MCNC: 3.5 G/DL (ref 3.4–5)
ALP SERPL-CCNC: 268 U/L (ref 40–150)
ALT SERPL W P-5'-P-CCNC: 26 U/L (ref 0–70)
ANION GAP SERPL CALCULATED.3IONS-SCNC: 7 MMOL/L (ref 3–14)
AST SERPL W P-5'-P-CCNC: 25 U/L (ref 0–45)
BASOPHILS # BLD AUTO: 0.1 10E9/L (ref 0–0.2)
BASOPHILS NFR BLD AUTO: 1 %
BILIRUB SERPL-MCNC: 0.5 MG/DL (ref 0.2–1.3)
BUN SERPL-MCNC: 18 MG/DL (ref 7–30)
CALCIUM SERPL-MCNC: 8.6 MG/DL (ref 8.5–10.1)
CHLORIDE SERPL-SCNC: 103 MMOL/L (ref 94–109)
CO2 SERPL-SCNC: 22 MMOL/L (ref 20–32)
CREAT SERPL-MCNC: 1.1 MG/DL (ref 0.66–1.25)
DIFFERENTIAL METHOD BLD: ABNORMAL
EOSINOPHIL # BLD AUTO: 0.3 10E9/L (ref 0–0.7)
EOSINOPHIL NFR BLD AUTO: 6.8 %
ERYTHROCYTE [DISTWIDTH] IN BLOOD BY AUTOMATED COUNT: 13.5 % (ref 10–15)
GFR SERPL CREATININE-BSD FRML MDRD: 74 ML/MIN/{1.73_M2}
GLUCOSE SERPL-MCNC: 88 MG/DL (ref 70–99)
HCT VFR BLD AUTO: 29.9 % (ref 40–53)
HGB BLD-MCNC: 9.3 G/DL (ref 13.3–17.7)
IMM GRANULOCYTES # BLD: 0.2 10E9/L (ref 0–0.4)
IMM GRANULOCYTES NFR BLD: 3.1 %
LYMPHOCYTES # BLD AUTO: 1.1 10E9/L (ref 0.8–5.3)
LYMPHOCYTES NFR BLD AUTO: 21.8 %
MCH RBC QN AUTO: 28.2 PG (ref 26.5–33)
MCHC RBC AUTO-ENTMCNC: 31.1 G/DL (ref 31.5–36.5)
MCV RBC AUTO: 91 FL (ref 78–100)
MONOCYTES # BLD AUTO: 0.7 10E9/L (ref 0–1.3)
MONOCYTES NFR BLD AUTO: 14.4 %
NEUTROPHILS # BLD AUTO: 2.6 10E9/L (ref 1.6–8.3)
NEUTROPHILS NFR BLD AUTO: 52.9 %
NRBC # BLD AUTO: 0 10*3/UL
NRBC BLD AUTO-RTO: 0 /100
PLATELET # BLD AUTO: 251 10E9/L (ref 150–450)
POTASSIUM SERPL-SCNC: 4.3 MMOL/L (ref 3.4–5.3)
PROT SERPL-MCNC: 6.4 G/DL (ref 6.8–8.8)
RBC # BLD AUTO: 3.3 10E12/L (ref 4.4–5.9)
SODIUM SERPL-SCNC: 132 MMOL/L (ref 133–144)
TACROLIMUS BLD-MCNC: 5.9 UG/L (ref 5–15)
TME LAST DOSE: NORMAL H
WBC # BLD AUTO: 4.9 10E9/L (ref 4–11)

## 2019-07-24 RX ORDER — ALBUTEROL SULFATE 0.83 MG/ML
2.5 SOLUTION RESPIRATORY (INHALATION) ONCE
Status: CANCELLED | OUTPATIENT
Start: 2019-08-21

## 2019-07-24 RX ORDER — PENTAMIDINE ISETHIONATE 300 MG/300MG
300 INHALANT RESPIRATORY (INHALATION) ONCE
Status: COMPLETED | OUTPATIENT
Start: 2019-07-24 | End: 2019-07-24

## 2019-07-24 RX ORDER — ALBUTEROL SULFATE 0.83 MG/ML
2.5 SOLUTION RESPIRATORY (INHALATION) ONCE
Status: COMPLETED | OUTPATIENT
Start: 2019-07-24 | End: 2019-07-24

## 2019-07-24 RX ORDER — PENTAMIDINE ISETHIONATE 300 MG/300MG
300 INHALANT RESPIRATORY (INHALATION) ONCE
Status: CANCELLED | OUTPATIENT
Start: 2019-08-21

## 2019-07-24 RX ADMIN — PENTAMIDINE ISETHIONATE 300 MG: 300 INHALANT RESPIRATORY (INHALATION) at 10:29

## 2019-07-24 RX ADMIN — ALBUTEROL SULFATE 2.5 MG: 0.83 SOLUTION RESPIRATORY (INHALATION) at 10:28

## 2019-07-24 NOTE — PROGRESS NOTES
Everton Larios here today for pentamidine neb treatment per Corinna Ayala  Patient was first given 2.5 mg albuterol neb as pre- treatment.  Patient completed albuterol with no complications noted.  Patient was then given PENTAM 300 mg mixed with 6 mL sterile water.  No adverse reactions noted during treatment by patient or technologist.  Patient completed pentamidine neb treatment.    Albuterol Sulfate 2.5 mg  NDC: 7051-6920-73  LOT: [id number]  EXP: FEB 21    PENTAM 300 mg injectable  NDC: 62280-779-73  LOT: [id number]  EXP 03/20

## 2019-07-25 LAB
CMV DNA SPEC NAA+PROBE-ACNC: 3023 [IU]/ML
CMV DNA SPEC NAA+PROBE-LOG#: 3.5 {LOG_IU}/ML
SPECIMEN SOURCE: ABNORMAL

## 2019-07-26 ENCOUNTER — TELEPHONE (OUTPATIENT)
Dept: INFECTIOUS DISEASES | Facility: CLINIC | Age: 54
End: 2019-07-26

## 2019-07-29 ENCOUNTER — RECORDS - HEALTHEAST (OUTPATIENT)
Dept: ADMINISTRATIVE | Facility: OTHER | Age: 54
End: 2019-07-29

## 2019-07-29 ENCOUNTER — AMBULATORY - HEALTHEAST (OUTPATIENT)
Dept: CARDIAC REHAB | Facility: HOSPITAL | Age: 54
End: 2019-07-29

## 2019-07-29 DIAGNOSIS — Z94.1 STATUS POST HEART TRANSPLANTATION (H): ICD-10-CM

## 2019-07-31 ENCOUNTER — RECORDS - HEALTHEAST (OUTPATIENT)
Dept: ADMINISTRATIVE | Facility: OTHER | Age: 54
End: 2019-07-31

## 2019-08-01 ENCOUNTER — TELEPHONE (OUTPATIENT)
Dept: TRANSPLANT | Facility: CLINIC | Age: 54
End: 2019-08-01

## 2019-08-01 ENCOUNTER — AMBULATORY - HEALTHEAST (OUTPATIENT)
Dept: CARDIAC REHAB | Facility: HOSPITAL | Age: 54
End: 2019-08-01

## 2019-08-01 ENCOUNTER — RECORDS - HEALTHEAST (OUTPATIENT)
Dept: HEALTH INFORMATION MANAGEMENT | Facility: CLINIC | Age: 54
End: 2019-08-01

## 2019-08-01 DIAGNOSIS — Z94.1 STATUS POST HEART TRANSPLANTATION (H): ICD-10-CM

## 2019-08-01 DIAGNOSIS — Z94.1 HEART REPLACED BY TRANSPLANT (H): Primary | ICD-10-CM

## 2019-08-01 DIAGNOSIS — Z94.1 TRANSPLANTED HEART (H): ICD-10-CM

## 2019-08-01 NOTE — TELEPHONE ENCOUNTER
LM for patient to call back regarding resulted labs. Patient will need to increase his tacro dose. CMV detected, need to communicate POC per Dr. Donis. Instructed patient to call back.

## 2019-08-02 ENCOUNTER — OFFICE VISIT - HEALTHEAST (OUTPATIENT)
Dept: INTERNAL MEDICINE | Facility: CLINIC | Age: 54
End: 2019-08-02

## 2019-08-02 DIAGNOSIS — Z94.1 HEART REPLACED BY TRANSPLANT (H): ICD-10-CM

## 2019-08-02 DIAGNOSIS — M79.10 MYALGIA: ICD-10-CM

## 2019-08-02 PROCEDURE — 80197 ASSAY OF TACROLIMUS: CPT | Performed by: INTERNAL MEDICINE

## 2019-08-02 RX ORDER — TACROLIMUS 1 MG/1
4 CAPSULE ORAL 2 TIMES DAILY
Qty: 720 CAPSULE | Refills: 3 | Status: ON HOLD | OUTPATIENT
Start: 2019-08-02 | End: 2019-09-28

## 2019-08-02 NOTE — TELEPHONE ENCOUNTER
Discussed labs with pt including FK level 5.9 - thought an earlier message said to increase to 4 mg BID - had labs with level redrawn 8/2. Cont current dose until labs back.     Discussed CMV and need to call should he develop any symptoms patient states he understands.

## 2019-08-05 LAB
TACROLIMUS BLD-MCNC: 9.8 UG/L (ref 5–15)
TME LAST DOSE: NORMAL H

## 2019-08-08 ENCOUNTER — TELEPHONE (OUTPATIENT)
Dept: TRANSPLANT | Facility: CLINIC | Age: 54
End: 2019-08-08

## 2019-08-08 ENCOUNTER — AMBULATORY - HEALTHEAST (OUTPATIENT)
Dept: CARDIAC REHAB | Facility: HOSPITAL | Age: 54
End: 2019-08-08

## 2019-08-08 ENCOUNTER — PRE VISIT (OUTPATIENT)
Dept: TRANSPLANT | Facility: CLINIC | Age: 54
End: 2019-08-08
Payer: COMMERCIAL

## 2019-08-08 DIAGNOSIS — Z94.1 STATUS POST HEART TRANSPLANTATION (H): ICD-10-CM

## 2019-08-08 DIAGNOSIS — Z13.29 THYROID DISORDER SCREENING: ICD-10-CM

## 2019-08-08 DIAGNOSIS — Z94.1 TRANSPLANTED HEART (H): ICD-10-CM

## 2019-08-08 DIAGNOSIS — Z12.5 PROSTATE CANCER SCREENING: ICD-10-CM

## 2019-08-08 DIAGNOSIS — Z13.220 LIPID SCREENING: Primary | ICD-10-CM

## 2019-08-08 DIAGNOSIS — E11.9 DIABETES MELLITUS (H): ICD-10-CM

## 2019-08-08 NOTE — TELEPHONE ENCOUNTER
Called Everton regarding recent tacrolimus level and to verify current dose. Patient states that prior to his lab draw on 8/2, he had been taking 4 mg tacro BID. Tacro level 9.8 (goal 8-10). He thought he was told that his level was high and started taking 4/5 mg this Monday (8/5). Instructed to decrease the dose he is taking back to to 4/4 mg and to consult transplant coordinator before dose changes. Pt verbalizes understanding. Recheck in one week with clinic appointment. Reminded to do a good 12 hour trough. Call with questions or concerns.

## 2019-08-08 NOTE — TELEPHONE ENCOUNTER
Patient Call: Returning call to Jocelynn Jerez          Call back needed? Yes    Return Call Needed  Same as documented in contacts section  When to return call?: Same day: Route [facility] Priority

## 2019-08-12 RX ORDER — POTASSIUM CHLORIDE 750 MG/1
40 TABLET, EXTENDED RELEASE ORAL
Status: CANCELLED | OUTPATIENT
Start: 2019-08-12

## 2019-08-12 RX ORDER — SODIUM CHLORIDE 9 MG/ML
INJECTION, SOLUTION INTRAVENOUS CONTINUOUS
Status: CANCELLED | OUTPATIENT
Start: 2019-08-12

## 2019-08-12 RX ORDER — LIDOCAINE 40 MG/G
CREAM TOPICAL
Status: CANCELLED | OUTPATIENT
Start: 2019-08-12

## 2019-08-12 RX ORDER — POTASSIUM CHLORIDE 750 MG/1
20 TABLET, EXTENDED RELEASE ORAL
Status: CANCELLED | OUTPATIENT
Start: 2019-08-12

## 2019-08-13 ENCOUNTER — APPOINTMENT (OUTPATIENT)
Dept: LAB | Facility: CLINIC | Age: 54
End: 2019-08-13
Attending: INTERNAL MEDICINE
Payer: COMMERCIAL

## 2019-08-13 ENCOUNTER — OFFICE VISIT (OUTPATIENT)
Dept: CARDIOLOGY | Facility: CLINIC | Age: 54
End: 2019-08-13
Attending: NURSE PRACTITIONER
Payer: COMMERCIAL

## 2019-08-13 ENCOUNTER — HOSPITAL ENCOUNTER (OUTPATIENT)
Facility: CLINIC | Age: 54
Discharge: HOME | End: 2019-08-13
Attending: INTERNAL MEDICINE | Admitting: INTERNAL MEDICINE
Payer: COMMERCIAL

## 2019-08-13 ENCOUNTER — APPOINTMENT (OUTPATIENT)
Dept: MEDSURG UNIT | Facility: CLINIC | Age: 54
End: 2019-08-13
Attending: INTERNAL MEDICINE
Payer: COMMERCIAL

## 2019-08-13 ENCOUNTER — RECORDS - HEALTHEAST (OUTPATIENT)
Dept: ADMINISTRATIVE | Facility: OTHER | Age: 54
End: 2019-08-13

## 2019-08-13 ENCOUNTER — HOSPITAL ENCOUNTER (OUTPATIENT)
Dept: CARDIOLOGY | Facility: CLINIC | Age: 54
End: 2019-08-13
Attending: NURSE PRACTITIONER | Admitting: INTERNAL MEDICINE
Payer: COMMERCIAL

## 2019-08-13 VITALS
WEIGHT: 132.6 LBS | HEART RATE: 113 BPM | OXYGEN SATURATION: 100 % | BODY MASS INDEX: 20.1 KG/M2 | HEIGHT: 68 IN | DIASTOLIC BLOOD PRESSURE: 68 MMHG | SYSTOLIC BLOOD PRESSURE: 113 MMHG

## 2019-08-13 VITALS
OXYGEN SATURATION: 100 % | RESPIRATION RATE: 18 BRPM | SYSTOLIC BLOOD PRESSURE: 115 MMHG | DIASTOLIC BLOOD PRESSURE: 76 MMHG | TEMPERATURE: 98.2 F | HEART RATE: 100 BPM

## 2019-08-13 DIAGNOSIS — Z94.1 TRANSPLANTED HEART (H): Primary | ICD-10-CM

## 2019-08-13 DIAGNOSIS — Z94.1 HEART REPLACED BY TRANSPLANT (H): ICD-10-CM

## 2019-08-13 LAB
ANION GAP SERPL CALCULATED.3IONS-SCNC: 10 MMOL/L (ref 3–14)
BUN SERPL-MCNC: 32 MG/DL (ref 7–30)
CALCIUM SERPL-MCNC: 9.2 MG/DL (ref 8.5–10.1)
CHLORIDE SERPL-SCNC: 106 MMOL/L (ref 94–109)
CO2 SERPL-SCNC: 22 MMOL/L (ref 20–32)
CREAT SERPL-MCNC: 1.26 MG/DL (ref 0.66–1.25)
ERYTHROCYTE [DISTWIDTH] IN BLOOD BY AUTOMATED COUNT: 13.5 % (ref 10–15)
GFR SERPL CREATININE-BSD FRML MDRD: 63 ML/MIN/{1.73_M2}
GLUCOSE SERPL-MCNC: 80 MG/DL (ref 70–99)
HCT VFR BLD AUTO: 30.3 % (ref 40–53)
HGB BLD-MCNC: 9.2 G/DL (ref 13.3–17.7)
MCH RBC QN AUTO: 27 PG (ref 26.5–33)
MCHC RBC AUTO-ENTMCNC: 30.4 G/DL (ref 31.5–36.5)
MCV RBC AUTO: 89 FL (ref 78–100)
PLATELET # BLD AUTO: 273 10E9/L (ref 150–450)
POTASSIUM SERPL-SCNC: 4.6 MMOL/L (ref 3.4–5.3)
RBC # BLD AUTO: 3.41 10E12/L (ref 4.4–5.9)
SODIUM SERPL-SCNC: 138 MMOL/L (ref 133–144)
TACROLIMUS BLD-MCNC: 7.5 UG/L (ref 5–15)
TME LAST DOSE: NORMAL H
WBC # BLD AUTO: 3.6 10E9/L (ref 4–11)

## 2019-08-13 PROCEDURE — 88346 IMFLUOR 1ST 1ANTB STAIN PX: CPT | Performed by: INTERNAL MEDICINE

## 2019-08-13 PROCEDURE — 88350 IMFLUOR EA ADDL 1ANTB STN PX: CPT | Performed by: INTERNAL MEDICINE

## 2019-08-13 PROCEDURE — 93505 ENDOMYOCARDIAL BIOPSY: CPT | Performed by: INTERNAL MEDICINE

## 2019-08-13 PROCEDURE — 25000125 ZZHC RX 250: Performed by: PHYSICIAN ASSISTANT

## 2019-08-13 PROCEDURE — 80197 ASSAY OF TACROLIMUS: CPT | Performed by: INTERNAL MEDICINE

## 2019-08-13 PROCEDURE — 27210794 ZZH OR GENERAL SUPPLY STERILE: Performed by: INTERNAL MEDICINE

## 2019-08-13 PROCEDURE — 99214 OFFICE O/P EST MOD 30 MIN: CPT | Mod: ZP | Performed by: NURSE PRACTITIONER

## 2019-08-13 PROCEDURE — 85027 COMPLETE CBC AUTOMATED: CPT | Performed by: PHYSICIAN ASSISTANT

## 2019-08-13 PROCEDURE — 80048 BASIC METABOLIC PNL TOTAL CA: CPT | Performed by: PHYSICIAN ASSISTANT

## 2019-08-13 PROCEDURE — 25000125 ZZHC RX 250: Performed by: INTERNAL MEDICINE

## 2019-08-13 PROCEDURE — G0463 HOSPITAL OUTPT CLINIC VISIT: HCPCS | Mod: ZF

## 2019-08-13 PROCEDURE — 88307 TISSUE EXAM BY PATHOLOGIST: CPT | Performed by: INTERNAL MEDICINE

## 2019-08-13 PROCEDURE — 40000166 ZZH STATISTIC PP CARE STAGE 1

## 2019-08-13 RX ORDER — LIDOCAINE 40 MG/G
CREAM TOPICAL
Status: COMPLETED | OUTPATIENT
Start: 2019-08-13 | End: 2019-08-13

## 2019-08-13 RX ORDER — LIDOCAINE 40 MG/G
CREAM TOPICAL
Status: CANCELLED | OUTPATIENT
Start: 2019-08-13

## 2019-08-13 RX ADMIN — LIDOCAINE: 40 CREAM TOPICAL at 11:52

## 2019-08-13 ASSESSMENT — PAIN SCALES - GENERAL: PAINLEVEL: NO PAIN (0)

## 2019-08-13 ASSESSMENT — MIFFLIN-ST. JEOR: SCORE: 1405.97

## 2019-08-13 NOTE — Clinical Note
Lab results would have been reviewed and abnormals discussed with physician had they been available.

## 2019-08-13 NOTE — LETTER
8/13/2019       RE: Everton Larios  [address] [facility] [address]     Dear Colleague,    Thank you for referring your patient, Everton Larios, to the [facility] HEART CARE at [facility]. Please see a copy of my visit note below.    ADULT HEART TRANSPLANT CLINIC  August 13, 2019    HPI:   Mr. Everton Larios is 54yr old male with a history of ASD (s/p repair in childhood), AFib/AF (s/p ablation), NICM, diastolic dysfunction, alcohol abuse, Pierce's esophagus, ulcerative colitis, GIB (s/p splenic embolization), and hypothyroidism, now s/p OHT and bypass of persistent left SVC to right atrial appendage 2/24/19, who presents to clinic for routine surveillance.     His postoperative course was c/b shock due to RV dysfunction requiring IABP support, small pneumoperitoneum (clinically insignificant), chest wall hematoma (former ICD site, s/p evacuation and drain placement 3/31/19), HCAP/klebsiella pneumonia, and FRANKLIN (required short-term HD, now recovered).  He was transferred to  rehab 4/3, and ultimately discharged to his local residence 4/15.     His biopsy 3/25/19 showed mild AMR1 with some staining for c4d.  Repeat biopsy 3/28 showed improved AMR and less reactive capillary staining, and subsequent biopsies have now shown resolution of AMR and improved capillary staining.  Baseline coronary angiogram has been deferred due to renal function.  His last echo 6/4 showed stable graft function with LVEF 55-60%, mild RV dilation with mildly reduced RV function, and mild-moderate tricuspid insufficiency (RVSP 29.4 +RAP).     He had a fall in early April, and was found to have a compression fracture.    He was hospitalized 4/28-5/9 with fevers, URI symptoms, abdominal pain, and diarrhea, and was found to be neutropenic with pseudomonas bacteremia and HCAP.  Chest CT showed diffuse bilateral solid with peripheral groundglass pulmonary nodules/opacities and mediastinal  adenopathy, BAL grew pseudomonas, and fungitell was positive.  Transplant ID was consulted, and he was treated with 4 weeks of IV cefepime and micafungin.  Repeat CT 5/22 showed significantly decreased bilateral nodular and consolidative opacities.     He represented to [facility] 5/24 with FRANKLIN (creatinine 3.43), thought to be secondary to poor intake and self-adjusting diuretics.  He was given 2L NS and 1L LR, with creatinine trending down upon discharge 5/26.    Since his last visit, he states that he feels well overall.  He continues to note back pain, plantar fascitis, calf pain, and achilles pain -- all of which has been ongoing and not worsened.  He also notes a lot of musculoskeletal pain throughout his body.  He has been doing PT exercises, which has helped some and he has noted mild improvement.  He has always had trouble standing for extended periods of time.  He continues to do cardiac rehab once/week, which is going well.  He is walking regularly, and notes good strength and endurance.  His breathing status has been stable, and he denies SOB, PND, and orthopnea.  He has been constipated, and followed at an outside GI clinic who recommended that he start linzess.  He developed uncontrolled diarrhea 3 days after starting linzess, so he stopped taking it.  He is now using miralax.  He notes that the GI provider he saw recommended repeating a colonoscopy given his h/o UC.  He notes that when he is constipated, he is nauseated, bloated, and can have some vomiting.  He is hydrating well.  His weight is usually stable, and he does not feel any fluid retention.  His BPs remain stable.  He otherwise denies chest pain, palpitations, dizziness, headaches, acute vision changes, fevers, chills, cough, and sore throat.      Serostatus:  CMV D+/R+  EBV D+/R+    Patient Active Problem List    Diagnosis Date Noted     Transplanted heart (H) 05/30/2019     Priority: Medium     Added automatically from request for surgery  [id number]       Central line clotted (H) 05/24/2019     Priority: Medium     FRANKLIN (acute kidney injury) (H) 05/24/2019     Priority: Medium     Sepsis due to Pseudomonas aeruginosa (H) 04/30/2019     Priority: Medium     Pulmonary nodules 04/30/2019     Priority: Medium     Compression fracture of thoracic vertebra (H) 04/30/2019     Priority: Medium     Abnormal white blood cell (WBC) count 04/26/2019     Priority: Medium     Debility 04/03/2019     Priority: Medium     Heart replaced by transplant (H) 02/24/2019     Priority: Medium     Donor CMV positive/ Recipient CMV positive.   Donor EBV positive/ Recipient EBV positive.     Donor NOT PHS higher risk          Iron deficiency anemia 10/18/2018     Priority: Medium     H/O congenital atrial septal defect (ASD) repair at age 3 10/04/2018     Priority: Medium     Ulcerative colitis (H) 10/04/2018     Priority: Medium     Hypothyroidism due to medication 10/04/2018     Priority: Medium     DJD (degenerative joint disease) - neck 10/04/2018     Priority: Medium     Pierce's esophagus 10/04/2018     Priority: Medium     Intermittent asthma without complication 10/04/2018     Priority: Medium     Chronic rhinitis 10/04/2018     Priority: Medium     Depression 10/04/2018     Priority: Medium       PAST MEDICAL HISTORY:  Past Medical History:   Diagnosis Date     Alcohol abuse      Pierce's esophagus 10/4/2018     Chronic rhinitis 10/4/2018     Chronic systolic heart failure (H) 10/4/2018     Depression 10/4/2018     Diastolic dysfunction      DJD (degenerative joint disease) - neck 10/4/2018     H/O congenital atrial septal defect (ASD) repair at age 3 10/4/2018     Hypothyroidism due to medication 10/4/2018     ICD, [place] scientific 2008; gen change 2/2018 10/4/2018     Intermittent asthma without complication 10/4/2018     Nonischemic cardiomyopathy (H) 10/4/2018     On amiodarone therapy 10/4/2018     Paroxysmal atrial fibrillation (H) 10/4/2018     S/P ablation of  atrial fibrillation 10/4/2018     Systolic heart failure (H)      Ulcerative colitis (H) 10/4/2018     Ventricular tachycardia (H) 10/4/2018       CURRENT MEDICATIONS:  Prescription Medications as of 8/13/2019       Rx Number Disp Refills Start End Last Dispensed Date Next Fill Date Owning Pharmacy    acetaminophen (TYLENOL) 325 MG tablet    4/1/2019        Sig: Take 2 tablets (650 mg) by mouth every 4 hours as needed for mild pain    Class: Transitional    Route: Oral    amLODIPine (NORVASC) 5 MG tablet  90 tablet 0 6/27/2019    [facility] [id number] [place] [address]    Sig: Take 1 tablet (5 mg) by mouth daily    Class: E-Prescribe    Route: Oral    aspirin (ASA) 81 MG chewable tablet    4/2/2019        Sig: Take 1 tablet (81 mg) by mouth daily    Class: Transitional    Route: Oral    calcium carbonate 600 mg-vitamin D 400 units (CALTRATE) 600-400 MG-UNIT per tablet    4/1/2019        Sig: Take 1 tablet by mouth 2 times daily (with meals)    Class: Transitional    Route: Oral    cetirizine (ZYRTEC) 10 MG tablet        [facility] [id number] [place] [address]    Sig: Take 10 mg by mouth daily    Class: Historical    Route: Oral    DULoxetine (CYMBALTA) 20 MG EC capsule            Sig: Take 20 mg by mouth daily    Class: Historical    Route: Oral    fluticasone (FLONASE) 50 MCG/ACT nasal spray            Sig: Spray 1 spray into both nostrils 2 times daily as needed     Class: Historical    Route: Both Nostrils    levothyroxine (SYNTHROID/LEVOTHROID) 100 MCG tablet            Sig: Take 100 mcg by mouth daily     Class: Historical    Route: Oral    magnesium oxide (MAG-OX) 400 MG tablet  180 tablet 3 4/19/2019    [facility] [id number] [place] [address]    Sig: Take 1 tablet (400 mg) by mouth 2 times daily    Class: E-Prescribe    Notes to Pharmacy: Increase in dose    Route: Oral    melatonin 3 MG tablet    4/1/2019        Sig: Take 2 tablets (6 mg) by mouth At  Bedtime    Class: Transitional    Route: Oral    montelukast (SINGULAIR) 10 MG tablet    10/4/2018        Sig: Take 1 tablet (10 mg) by mouth At Bedtime    Class: Historical    Route: Oral    multivitamin w/minerals (THERA-VIT-M) tablet    4/12/2019        Sig: Take 1 tablet by mouth daily    Class: OTC    Route: Oral    mycophenolate (GENERIC EQUIVALENT) 250 MG capsule  360 capsule 11 5/24/2019    [facility] [id number] [place] [address]    Sig: Take 6 capsules (1,500 mg) by mouth 2 times daily    Class: E-Prescribe    Route: Oral    omeprazole (PRILOSEC) 40 MG DR capsule    4/26/2019    [id number] [place] [address]    Sig: Take 1 capsule (40 mg) by mouth daily    Class: Historical    Route: Oral    polyethylene glycol (MIRALAX/GLYCOLAX) powder            Sig: Take 1 capful by mouth daily    Class: Historical    Route: Oral    potassium chloride ER (K-DUR/KLOR-CON M) 20 MEQ CR tablet  120 tablet 0 5/24/2019    [id number] [place] [address]    Sig: Take 1 tablet (20 mEq) by mouth daily    Class: E-Prescribe    Route: Oral    rosuvastatin (CRESTOR) 10 MG tablet  90 tablet 1 5/20/2019    [id number] [place] [address]    Sig: Take 1 tablet (10 mg) by mouth daily    Class: E-Prescribe    Route: Oral    tacrolimus (GENERIC EQUIVALENT) 1 MG capsule  720 capsule 3 8/2/2019    [id number] [place] [address]    Sig: Take 4 capsules (4 mg) by mouth 2 times daily    Class: E-Prescribe    Notes to Pharmacy: Increase in dose    Route: Oral    albuterol (PROAIR HFA/PROVENTIL HFA/VENTOLIN HFA) 108 (90 Base) MCG/ACT inhaler    10/4/2018        Sig: Inhale 2 puffs into the lungs every 6 hours as needed for shortness of breath / dyspnea or wheezing    Class: Historical    Route: Inhalation    balsalazide (COLAZAL) 750 MG capsule    11/9/2018    [id number] [place] [address]     "Sig: TAKE 3 CAPSULES BY MOUTH TWICE DAILY    Class: Historical    fluticasone (FLOVENT HFA) 220 MCG/ACT Inhaler            Sig: Inhale 2 puffs into the lungs 2 times daily    Class: Historical    Route: Inhalation    senna (SENOKOT) 8.6 MG tablet            Sig: Take 2 tablets by mouth 2 times daily as needed for constipation    Class: Historical    Route: Oral    tacrolimus (GENERIC EQUIVALENT) 0.5 MG capsule  90 capsule 3 7/18/2019    [facility] [address] [place] [address]    Sig: Pt currently not taking.    Class: E-Prescribe      Hospital Medications as of 8/13/2019       Dose Frequency Start End    lidocaine (LMX4) cream (Completed)  ONCE PRN 8/13/2019 8/13/2019    Sig: Apply topically once as needed for mild pain    Class: E-Prescribe    Route: Topical          ROS:   Constitutional: No fever, chills, or sweats. Stable weight.   ENT: No visual disturbance, ear ache, epistaxis, sore throat.   Allergies/Immunologic: Negative.   Respiratory: As per HPI.   Cardiovascular: As per HPI.   GI: As per HPI.  : No urinary frequency, dysuria, or hematuria.   Integument: Negative.   Psychiatric: Negative.   Neuro: Negative.   Endocrinology: Negative.   Musculoskeletal: As per HPI.    Exam:  /68 (BP Location: Right arm, Patient Position: Chair, Cuff Size: Adult Regular)   Pulse 113   Ht 1.727 m (5' 8\")   Wt 60.1 kg (132 lb 9.6 oz)   SpO2 100%   BMI 20.16 kg/m     In general, the patient is a pleasant male in no apparent distress.    HEENT: NC/AT. PERRLA. EOMI.  Sclerae white, not injected.    Neck:  No adenopathy, No thyromegaly.    COR: No jugular venous distention when sitting upright.  RRR.  Normal S1 S2 splits physiologically.  No murmur, rub click, or gallop.    Lungs:  CTA. No rhonchi.    Abdomen: soft, nontender, nondistended.  No organomegaly.  Extremities:  No clubbing, cyanosis, or LE edema.    Neuro: Alert & Oriented x 3, grossly non focal.  Integument: no open lesions, rashes, or " jaundice.    Labs:  CBC RESULTS:   Lab Results   Component Value Date    WBC 3.6 (L) 08/13/2019    RBC 3.41 (L) 08/13/2019    HGB 9.2 (L) 08/13/2019    HCT 30.3 (L) 08/13/2019    MCV 89 08/13/2019    MCH 27.0 08/13/2019    MCHC 30.4 (L) 08/13/2019    RDW 13.5 08/13/2019     08/13/2019       BMP RESULTS:  Lab Results   Component Value Date     08/13/2019    POTASSIUM 4.6 08/13/2019    CHLORIDE 106 08/13/2019    CO2 22 08/13/2019    ANIONGAP 10 08/13/2019    GLC 80 08/13/2019    BUN 32 (H) 08/13/2019    CR 1.26 (H) 08/13/2019    GFRESTIMATED 63 08/13/2019    GFRESTBLACK 73 08/13/2019    RADAMES 9.2 08/13/2019      LIPID RESULTS:  Lab Results   Component Value Date    CHOL 204 (H) 05/22/2019    HDL 85 05/22/2019    LDL 84 05/22/2019    TRIG 175 (H) 05/22/2019    NHDL 119 05/22/2019       IMMUNOSUPPRESSANT LEVELS  Lab Results   Component Value Date    TACROL 9.8 08/02/2019    DOSTAC 08/01/19 2045 08/02/2019       No components found for: CK  Lab Results   Component Value Date    MAG 1.7 07/17/2019     Lab Results   Component Value Date    A1C 5.7 (H) 02/23/2019     Lab Results   Component Value Date    PHOS 4.0 07/17/2019     Lab Results   Component Value Date    NTBNPI 45,480 (H) 03/27/2019     Lab Results   Component Value Date    SAITESTMET Banner 05/22/2019    SAICELL Class I 05/22/2019    AK0OPUQWF None 05/22/2019    IE2BIYJOQX None 05/22/2019    SAIREPCOM  05/22/2019      Test performed by modified procedure. Serum heat inactivated and tested   by a modified ([place]) protocol including fetal calf serum addition.   High-risk, mfi >3,000. Mod-risk, mfi 500-3,000.       Lab Results   Component Value Date    SAIITESTME Banner 05/22/2019    SAIICELL Class II 05/22/2019    [id number] None 05/22/2019    [id number] None 05/22/2019    SAIIREPCOM  05/22/2019      Test performed by modified procedure. Serum heat inactivated and tested   by a modified ([place]) protocol including fetal calf serum addition.   High-risk,  mfi >3,000. Mod-risk, mfi 500-3,000.       Lab Results   Component Value Date    CSPEC EDTA PLASMA 07/24/2019       Assessment and Plan:  Mr. Everton Larios is 54yr old male with a history of ASD (s/p repair in childhood), AFib/AF (s/p ablation), NICM, diastolic dysfunction, alcohol abuse, Pierce's esophagus, ulcerative colitis, GIB (s/p splenic embolization), and hypothyroidism, now s/p OHT and bypass of persistent left SVC to right atrial appendage 2/24/19, who presents to clinic for routine surveillance.    NICM, s/p OHT and bypass of persistent left SVC to right atrial appendage 2/24/19  His postoperative course was c/b shock due to RV dysfunction requiring IABP support, small pneumoperitoneum (clinically insignificant), chest wall hematoma (former ICD site, s/p evacuation and drain placement 3/31/19), HCAP/klebsiella pneumonia, and FRANKLIN (required short-term HD, now recovered).  He was transferred to  rehab 4/3, and ultimately discharged to his local residence 4/15.     His biopsy 3/25/19 showed mild AMR1 with some staining for c4d.  Repeat biopsy 3/28 showed improved AMR and less reactive capillary staining, and subsequent biopsies have now shown resolution of AMR and improved capillary staining.  Baseline coronary angiogram has been deferred due to renal function.  His last echo 6/4 showed stable graft function with LVEF 55-60%, mild RV dilation with mildly reduced RV function, and mild-moderate tricuspid insufficiency (RVSP 29.4 +RAP).     He had a fall in early April, and was found to have a compression fracture.    He was hospitalized 4/28-5/9 with fevers, URI symptoms, abdominal pain, and diarrhea, and was found to be neutropenic with pseudomonas bacteremia and HCAP.  Chest CT showed diffuse bilateral solid with peripheral groundglass pulmonary nodules/opacities and mediastinal adenopathy, BAL grew pseudomonas, and fungitell was positive.  Transplant ID was consulted, and he was treated with 4 weeks of IV  cefepime and micafungin.  Repeat CT 5/22 showed significantly decreased bilateral nodular and consolidative opacities.     He represented to [facility] 5/24 with FRANKLIN (creatinine 3.43), thought to be secondary to poor intake and self-adjusting diuretics.  He was given 2L NS and 1L LR, with creatinine trending down upon discharge 5/26.    Labs today show stable electrolytes, kidney function, and blood counts.  Tacro level pending.  Echo results from today pending.    Mr. Larios appears well today.  His weight has remained stable, and he appears euvolemic.  His BPs remain stable.  Advised that he continue his current medications, and call with any new concerns.      Serostatus:  - CMV D+/R+  - EBV D+/R+     Immunosuppression:  - tacrolimus, goal level 8-10.  Level from today pending, will reflect a 14hr trough.  - MMF 1500mg twice daily     Graft function:  - BPs:  Stable, remain <140/90.  Continue amlodipine 5mg daily.  - HRs:  Stable, remain >80  - fluid status:  Euvolemic, off diuretics.  Discussed adequate po intake.      PPx:  - CAV:  Aspirin 81mg daily and rosuvastatin 10mg daily  - PCP:  pentamidine   - GERD:  Omeprazole (note h/o carrera's esophagus)  - CMV:  completed valcyte.  - Osteoporosis:  Calcium/vitamin D supplements    Health maintenance:  - referral to  to discuss finances  - endocrine referral re: bone density  - GI referral re: h/o UC and chronic constipation, and possible need for colonoscopy  - continue PT exercises and CR        Right chest wall hematoma  SVC grafting  Hematoma developed at former ICD site, and he is now s/p evacuation 3/31. SVC graft reportedly patent, so will need to monitor for increased swelling in left upper extremity.  Defer need for anticoagulation to Dr. Donis.      Leukopenia, resolved  Neutropenia, resolved  CBC stable, will continue to trend.  Plan to repeat CBC with diff in 1 month given slight decrease in WBC today.        The above was reviewed with Mr. Larios, who  verbalized understanding and will call with further questions/concerns.     30 minutes spent face-to-face with patient, with >50% in counseling and/or coordination of care as described above.        Elin Jensen, DNP, APRN, FNP-BC  Advanced Heart Failure Nurse Practitioner  [facility]  Patient Care Team:  Fredrick Garcia as PCP - General (Internal Medicine)  Jocelynn Jerez RN as Transplant Coordinator  Ric Shukla MD as MD (Infectious Diseases)  FREDRICK GARCIA

## 2019-08-13 NOTE — IP AVS SNAPSHOT
[address] [address]                                    After Visit Summary   8/13/2019    Everton Larios    MRN: [id number]           After Visit Summary Signature Page    I have received my discharge instructions, and my questions have been answered. I have discussed any challenges I see with this plan with the nurse or doctor.    ..........................................................................................................................................  Patient/Patient Representative Signature      ..........................................................................................................................................  Patient Representative Print Name and Relationship to Patient    ..................................................               ................................................  Date                                   Time    ..........................................................................................................................................  Reviewed by Signature/Title    ...................................................              ..............................................  Date                                               Time          22EPIC Rev 08/18

## 2019-08-13 NOTE — NURSING NOTE
Pt presents for 6 month post heart transplant routine visit with NP. Hbx, rhc, labs reviewed. Pt c/o joint pain. Last dexa 2/2019. Endocrine referral made. Pt also c/o constipation. Suggestion to try senna with miralax. Referral to our GI team. Currently patient sees [facility], but he feels frustrated with their care. No angio done with today's appointments due to scheduling conflict, we will reschedule at 8 month appointments. Pt states finances are tight. Message sent to [facility]ia to see if there is any financial support we can offer. Tacro level pending. Goal 8-10. Pt states today's draw is more like 14 hour trough. Will call with results. Wbc's 3.6, will continue to monitor.       RTC 8 month with rhc, cors, hbx. Message sent to schedulers and case request submitted.       Patient Instructions:   1. Referrals made for endocrine and GI  2. You can try senna with miralax for constipation  3. I will contact [employer] and see if there is any funding to help with medical costs.   4. I will call you with all pending labs.   5. We will schedule 8 month follow up with angiogram, RHC, and heart biopsy.       Jocelynn Jerez, RN BSN   Post Heart Transplant Nurse Coordinator  [facility]  Questions: [phone]

## 2019-08-13 NOTE — PATIENT INSTRUCTIONS
Patient Instructions:   1. Referrals made for endocrine and GI  2. You can try senna with miralax for constipation  3. I will contact [employer] and see if there is any funding to help with medical costs.   4. I will call you with all pending labs.   5. We will schedule 8 month follow up with angiogram, RHC, and heart biopsy.       Jocelynn Jerez, RN BSN   Post Heart Transplant Nurse Coordinator  [facility]  Questions: [phone]

## 2019-08-13 NOTE — PROGRESS NOTES
Patient tolerated recovery stage well. VSS, R IJ site clean/dry/intact, no hematoma, and denies pain. Patient tolerated PO food and fluids. Teaching was done and discharge instructions were given and pt signed. Patient ambulated, voided, and PIV was removed. Patient discharged from the hospital.

## 2019-08-13 NOTE — LETTER
8/13/2019      RE: Everton Larios  [address] [facility] [address]       Dear Colleague,    Thank you for the opportunity to participate in the care of your patient, Everton Larios, at the [facility] HEART [facility]. Please see a copy of my visit note below.    ADULT HEART TRANSPLANT CLINIC  August 13, 2019    HPI:   Mr. Everton Larios is 54yr old male with a history of ASD (s/p repair in childhood), AFib/AF (s/p ablation), NICM, diastolic dysfunction, alcohol abuse, Pierce's esophagus, ulcerative colitis, GIB (s/p splenic embolization), and hypothyroidism, now s/p OHT and bypass of persistent left SVC to right atrial appendage 2/24/19, who presents to clinic for routine surveillance.     His postoperative course was c/b shock due to RV dysfunction requiring IABP support, small pneumoperitoneum (clinically insignificant), chest wall hematoma (former ICD site, s/p evacuation and drain placement 3/31/19), HCAP/klebsiella pneumonia, and FRANKLIN (required short-term HD, now recovered).  He was transferred to  rehab 4/3, and ultimately discharged to his local residence 4/15.     His biopsy 3/25/19 showed mild AMR1 with some staining for c4d.  Repeat biopsy 3/28 showed improved AMR and less reactive capillary staining, and subsequent biopsies have now shown resolution of AMR and improved capillary staining.  Baseline coronary angiogram has been deferred due to renal function.  His last echo 6/4 showed stable graft function with LVEF 55-60%, mild RV dilation with mildly reduced RV function, and mild-moderate tricuspid insufficiency (RVSP 29.4 +RAP).     He had a fall in early April, and was found to have a compression fracture.    He was hospitalized 4/28-5/9 with fevers, URI symptoms, abdominal pain, and diarrhea, and was found to be neutropenic with pseudomonas bacteremia and HCAP.  Chest CT showed diffuse bilateral solid with peripheral groundglass pulmonary  nodules/opacities and mediastinal adenopathy, BAL grew pseudomonas, and fungitell was positive.  Transplant ID was consulted, and he was treated with 4 weeks of IV cefepime and micafungin.  Repeat CT 5/22 showed significantly decreased bilateral nodular and consolidative opacities.     He represented to [facility] 5/24 with FRANKLIN (creatinine 3.43), thought to be secondary to poor intake and self-adjusting diuretics.  He was given 2L NS and 1L LR, with creatinine trending down upon discharge 5/26.    Since his last visit, he states that he feels well overall.  He continues to note back pain, plantar fascitis, calf pain, and achilles pain -- all of which has been ongoing and not worsened.  He also notes a lot of musculoskeletal pain throughout his body.  He has been doing PT exercises, which has helped some and he has noted mild improvement.  He has always had trouble standing for extended periods of time.  He continues to do cardiac rehab once/week, which is going well.  He is walking regularly, and notes good strength and endurance.  His breathing status has been stable, and he denies SOB, PND, and orthopnea.  He has been constipated, and followed at an outside GI clinic who recommended that he start linzess.  He developed uncontrolled diarrhea 3 days after starting linzess, so he stopped taking it.  He is now using miralax.  He notes that the GI provider he saw recommended repeating a colonoscopy given his h/o UC.  He notes that when he is constipated, he is nauseated, bloated, and can have some vomiting.  He is hydrating well.  His weight is usually stable, and he does not feel any fluid retention.  His BPs remain stable.  He otherwise denies chest pain, palpitations, dizziness, headaches, acute vision changes, fevers, chills, cough, and sore throat.      Serostatus:  CMV D+/R+  EBV D+/R+    Patient Active Problem List    Diagnosis Date Noted     Transplanted heart (H) 05/30/2019     Priority: Medium     Added  automatically from request for surgery [id number]       Central line clotted (H) 05/24/2019     Priority: Medium     FRANKLIN (acute kidney injury) (H) 05/24/2019     Priority: Medium     Sepsis due to Pseudomonas aeruginosa (H) 04/30/2019     Priority: Medium     Pulmonary nodules 04/30/2019     Priority: Medium     Compression fracture of thoracic vertebra (H) 04/30/2019     Priority: Medium     Abnormal white blood cell (WBC) count 04/26/2019     Priority: Medium     Debility 04/03/2019     Priority: Medium     Heart replaced by transplant (H) 02/24/2019     Priority: Medium     Donor CMV positive/ Recipient CMV positive.   Donor EBV positive/ Recipient EBV positive.     Donor NOT PHS higher risk          Iron deficiency anemia 10/18/2018     Priority: Medium     H/O congenital atrial septal defect (ASD) repair at age 3 10/04/2018     Priority: Medium     Ulcerative colitis (H) 10/04/2018     Priority: Medium     Hypothyroidism due to medication 10/04/2018     Priority: Medium     DJD (degenerative joint disease) - neck 10/04/2018     Priority: Medium     Pierce's esophagus 10/04/2018     Priority: Medium     Intermittent asthma without complication 10/04/2018     Priority: Medium     Chronic rhinitis 10/04/2018     Priority: Medium     Depression 10/04/2018     Priority: Medium       PAST MEDICAL HISTORY:  Past Medical History:   Diagnosis Date     Alcohol abuse      Pierce's esophagus 10/4/2018     Chronic rhinitis 10/4/2018     Chronic systolic heart failure (H) 10/4/2018     Depression 10/4/2018     Diastolic dysfunction      DJD (degenerative joint disease) - neck 10/4/2018     H/O congenital atrial septal defect (ASD) repair at age 3 10/4/2018     Hypothyroidism due to medication 10/4/2018     ICD, [place] [employer] 2008; gen change 2/2018 10/4/2018     Intermittent asthma without complication 10/4/2018     Nonischemic cardiomyopathy (H) 10/4/2018     On amiodarone therapy 10/4/2018     Paroxysmal atrial  fibrillation (H) 10/4/2018     S/P ablation of atrial fibrillation 10/4/2018     Systolic heart failure (H)      Ulcerative colitis (H) 10/4/2018     Ventricular tachycardia (H) 10/4/2018       CURRENT MEDICATIONS:  Prescription Medications as of 8/13/2019       Rx Number Disp Refills Start End Last Dispensed Date Next Fill Date Owning Pharmacy    acetaminophen (TYLENOL) 325 MG tablet    4/1/2019        Sig: Take 2 tablets (650 mg) by mouth every 4 hours as needed for mild pain    Class: Transitional    Route: Oral    amLODIPine (NORVASC) 5 MG tablet  90 tablet 0 6/27/2019    [id number] [place] [address]    Sig: Take 1 tablet (5 mg) by mouth daily    Class: E-Prescribe    Route: Oral    aspirin (ASA) 81 MG chewable tablet    4/2/2019        Sig: Take 1 tablet (81 mg) by mouth daily    Class: Transitional    Route: Oral    calcium carbonate 600 mg-vitamin D 400 units (CALTRATE) 600-400 MG-UNIT per tablet    4/1/2019        Sig: Take 1 tablet by mouth 2 times daily (with meals)    Class: Transitional    Route: Oral    cetirizine (ZYRTEC) 10 MG tablet        [id number] [place] [address]    Sig: Take 10 mg by mouth daily    Class: Historical    Route: Oral    DULoxetine (CYMBALTA) 20 MG EC capsule            Sig: Take 20 mg by mouth daily    Class: Historical    Route: Oral    fluticasone (FLONASE) 50 MCG/ACT nasal spray            Sig: Spray 1 spray into both nostrils 2 times daily as needed     Class: Historical    Route: Both Nostrils    levothyroxine (SYNTHROID/LEVOTHROID) 100 MCG tablet            Sig: Take 100 mcg by mouth daily     Class: Historical    Route: Oral    magnesium oxide (MAG-OX) 400 MG tablet  180 tablet 3 4/19/2019    [id number] [place] [address]    Sig: Take 1 tablet (400 mg) by mouth 2 times daily    Class: E-Prescribe    Notes to Pharmacy: Increase in dose    Route: Oral    melatonin 3 MG tablet    4/1/2019         Sig: Take 2 tablets (6 mg) by mouth At Bedtime    Class: Transitional    Route: Oral    montelukast (SINGULAIR) 10 MG tablet    10/4/2018        Sig: Take 1 tablet (10 mg) by mouth At Bedtime    Class: Historical    Route: Oral    multivitamin w/minerals (THERA-VIT-M) tablet    4/12/2019        Sig: Take 1 tablet by mouth daily    Class: OTC    Route: Oral    mycophenolate (GENERIC EQUIVALENT) 250 MG capsule  360 capsule 11 5/24/2019    [id number] [place] [address]    Sig: Take 6 capsules (1,500 mg) by mouth 2 times daily    Class: E-Prescribe    Route: Oral    omeprazole (PRILOSEC) 40 MG DR capsule    4/26/2019    [id number] [place] [address]    Sig: Take 1 capsule (40 mg) by mouth daily    Class: Historical    Route: Oral    polyethylene glycol (MIRALAX/GLYCOLAX) powder            Sig: Take 1 capful by mouth daily    Class: Historical    Route: Oral    potassium chloride ER (K-DUR/KLOR-CON M) 20 MEQ CR tablet  120 tablet 0 5/24/2019    [id number] [place] [address]    Sig: Take 1 tablet (20 mEq) by mouth daily    Class: E-Prescribe    Route: Oral    rosuvastatin (CRESTOR) 10 MG tablet  90 tablet 1 5/20/2019    [id number] [place] [address]    Sig: Take 1 tablet (10 mg) by mouth daily    Class: E-Prescribe    Route: Oral    tacrolimus (GENERIC EQUIVALENT) 1 MG capsule  720 capsule 3 8/2/2019    [id number] [place] [address]    Sig: Take 4 capsules (4 mg) by mouth 2 times daily    Class: E-Prescribe    Notes to Pharmacy: Increase in dose    Route: Oral    albuterol (PROAIR HFA/PROVENTIL HFA/VENTOLIN HFA) 108 (90 Base) MCG/ACT inhaler    10/4/2018        Sig: Inhale 2 puffs into the lungs every 6 hours as needed for shortness of breath / dyspnea or wheezing    Class: Historical    Route: Inhalation    balsalazide (COLAZAL) 750 MG capsule    11/9/2018    [id number] [place] [facility]  "[address]    Sig: TAKE 3 CAPSULES BY MOUTH TWICE DAILY    Class: Historical    fluticasone (FLOVENT HFA) 220 MCG/ACT Inhaler            Sig: Inhale 2 puffs into the lungs 2 times daily    Class: Historical    Route: Inhalation    senna (SENOKOT) 8.6 MG tablet            Sig: Take 2 tablets by mouth 2 times daily as needed for constipation    Class: Historical    Route: Oral    tacrolimus (GENERIC EQUIVALENT) 0.5 MG capsule  90 capsule 3 7/18/2019    [id number] [place] [facility] - [address]    Sig: Pt currently not taking.    Class: E-Prescribe      Hospital Medications as of 8/13/2019       Dose Frequency Start End    lidocaine (LMX4) cream (Completed)  ONCE PRN 8/13/2019 8/13/2019    Sig: Apply topically once as needed for mild pain    Class: E-Prescribe    Route: Topical          ROS:   Constitutional: No fever, chills, or sweats. Stable weight.   ENT: No visual disturbance, ear ache, epistaxis, sore throat.   Allergies/Immunologic: Negative.   Respiratory: As per HPI.   Cardiovascular: As per HPI.   GI: As per HPI.  : No urinary frequency, dysuria, or hematuria.   Integument: Negative.   Psychiatric: Negative.   Neuro: Negative.   Endocrinology: Negative.   Musculoskeletal: As per HPI.    Exam:  /68 (BP Location: Right arm, Patient Position: Chair, Cuff Size: Adult Regular)   Pulse 113   Ht 1.727 m (5' 8\")   Wt 60.1 kg (132 lb 9.6 oz)   SpO2 100%   BMI 20.16 kg/m     In general, the patient is a pleasant male in no apparent distress.    HEENT: NC/AT. PERRLA. EOMI.  Sclerae white, not injected.    Neck:  No adenopathy, No thyromegaly.    COR: No jugular venous distention when sitting upright.  RRR.  Normal S1 S2 splits physiologically.  No murmur, rub click, or gallop.    Lungs:  CTA. No rhonchi.    Abdomen: soft, nontender, nondistended.  No organomegaly.  Extremities:  No clubbing, cyanosis, or LE edema.    Neuro: Alert & Oriented x 3, grossly non " focal.  Integument: no open lesions, rashes, or jaundice.    Labs:  CBC RESULTS:   Lab Results   Component Value Date    WBC 3.6 (L) 08/13/2019    RBC 3.41 (L) 08/13/2019    HGB 9.2 (L) 08/13/2019    HCT 30.3 (L) 08/13/2019    MCV 89 08/13/2019    MCH 27.0 08/13/2019    MCHC 30.4 (L) 08/13/2019    RDW 13.5 08/13/2019     08/13/2019       BMP RESULTS:  Lab Results   Component Value Date     08/13/2019    POTASSIUM 4.6 08/13/2019    CHLORIDE 106 08/13/2019    CO2 22 08/13/2019    ANIONGAP 10 08/13/2019    GLC 80 08/13/2019    BUN 32 (H) 08/13/2019    CR 1.26 (H) 08/13/2019    GFRESTIMATED 63 08/13/2019    GFRESTBLACK 73 08/13/2019    RADAMES 9.2 08/13/2019      LIPID RESULTS:  Lab Results   Component Value Date    CHOL 204 (H) 05/22/2019    HDL 85 05/22/2019    LDL 84 05/22/2019    TRIG 175 (H) 05/22/2019    NHDL 119 05/22/2019       IMMUNOSUPPRESSANT LEVELS  Lab Results   Component Value Date    TACROL 9.8 08/02/2019    DOSTAC 08/01/19 2045 08/02/2019       No components found for: CK  Lab Results   Component Value Date    MAG 1.7 07/17/2019     Lab Results   Component Value Date    A1C 5.7 (H) 02/23/2019     Lab Results   Component Value Date    PHOS 4.0 07/17/2019     Lab Results   Component Value Date    NTBNPI 45,480 (H) 03/27/2019     Lab Results   Component Value Date    SAITESTMET [facility] 05/22/2019    SAICELL Class I 05/22/2019    [id number] None 05/22/2019    [id number] None 05/22/2019    SAIREPCOM  05/22/2019      Test performed by modified procedure. Serum heat inactivated and tested   by a modified (Lynn) protocol including fetal calf serum addition.   High-risk, mfi >3,000. Mod-risk, mfi 500-3,000.       Lab Results   Component Value Date    SAIITESTME [facility] 05/22/2019    SAIICELL Class II 05/22/2019    [id number] None 05/22/2019    [id number] None 05/22/2019    SAIIREPCOM  05/22/2019      Test performed by modified procedure. Serum heat inactivated and tested   by a modified (Lynn) protocol  including fetal calf serum addition.   High-risk, mfi >3,000. Mod-risk, mfi 500-3,000.       Lab Results   Component Value Date    CSPEC EDTA PLASMA 07/24/2019       Assessment and Plan:  Mr. Everton Larios is 54yr old male with a history of ASD (s/p repair in childhood), AFib/AF (s/p ablation), NICM, diastolic dysfunction, alcohol abuse, Pierce's esophagus, ulcerative colitis, GIB (s/p splenic embolization), and hypothyroidism, now s/p OHT and bypass of persistent left SVC to right atrial appendage 2/24/19, who presents to clinic for routine surveillance.    NICM, s/p OHT and bypass of persistent left SVC to right atrial appendage 2/24/19  His postoperative course was c/b shock due to RV dysfunction requiring IABP support, small pneumoperitoneum (clinically insignificant), chest wall hematoma (former ICD site, s/p evacuation and drain placement 3/31/19), HCAP/klebsiella pneumonia, and FRANKLIN (required short-term HD, now recovered).  He was transferred to  rehab 4/3, and ultimately discharged to his local residence 4/15.     His biopsy 3/25/19 showed mild AMR1 with some staining for c4d.  Repeat biopsy 3/28 showed improved AMR and less reactive capillary staining, and subsequent biopsies have now shown resolution of AMR and improved capillary staining.  Baseline coronary angiogram has been deferred due to renal function.  His last echo 6/4 showed stable graft function with LVEF 55-60%, mild RV dilation with mildly reduced RV function, and mild-moderate tricuspid insufficiency (RVSP 29.4 +RAP).     He had a fall in early April, and was found to have a compression fracture.    He was hospitalized 4/28-5/9 with fevers, URI symptoms, abdominal pain, and diarrhea, and was found to be neutropenic with pseudomonas bacteremia and HCAP.  Chest CT showed diffuse bilateral solid with peripheral groundglass pulmonary nodules/opacities and mediastinal adenopathy, BAL grew pseudomonas, and fungitell was positive.  Transplant ID was  consulted, and he was treated with 4 weeks of IV cefepime and micafungin.  Repeat CT 5/22 showed significantly decreased bilateral nodular and consolidative opacities.     He represented to [facility] 5/24 with FRANKLIN (creatinine 3.43), thought to be secondary to poor intake and self-adjusting diuretics.  He was given 2L NS and 1L LR, with creatinine trending down upon discharge 5/26.    Labs today show stable electrolytes, kidney function, and blood counts.  Tacro level pending.  Echo results from today pending.    Mr. Larios appears well today.  His weight has remained stable, and he appears euvolemic.  His BPs remain stable.  Advised that he continue his current medications, and call with any new concerns.      Serostatus:  - CMV D+/R+  - EBV D+/R+     Immunosuppression:  - tacrolimus, goal level 8-10.  Level from today pending, will reflect a 14hr trough.  - MMF 1500mg twice daily     Graft function:  - BPs:  Stable, remain <140/90.  Continue amlodipine 5mg daily.  - HRs:  Stable, remain >80  - fluid status:  Euvolemic, off diuretics.  Discussed adequate po intake.      PPx:  - CAV:  Aspirin 81mg daily and rosuvastatin 10mg daily  - PCP:  pentamidine   - GERD:  Omeprazole (note h/o carrera's esophagus)  - CMV:  completed valcyte.  - Osteoporosis:  Calcium/vitamin D supplements    Health maintenance:  - referral to  to discuss finances  - endocrine referral re: bone density  - GI referral re: h/o UC and chronic constipation, and possible need for colonoscopy  - continue PT exercises and CR        Right chest wall hematoma  SVC grafting  Hematoma developed at former ICD site, and he is now s/p evacuation 3/31. SVC graft reportedly patent, so will need to monitor for increased swelling in left upper extremity.  Defer need for anticoagulation to Dr. Donsi.      Leukopenia, resolved  Neutropenia, resolved  CBC stable, will continue to trend.  Plan to repeat CBC with diff in 1 month given slight decrease in WBC  today.        The above was reviewed with Mr. Larios, who verbalized understanding and will call with further questions/concerns.     30 minutes spent face-to-face with patient, with >50% in counseling and/or coordination of care as described above.        Elin Jensen DNP, APRN, FNP-BC  Advanced Heart Failure Nurse Practitioner  [facility]  Patient Care Team:  Fredrick Garcia as PCP - General (Internal Medicine)  Jocelynn Jerez RN as Transplant Coordinator  Ric Shukla MD as MD (Infectious Diseases)  FREDRICK GARCIA    Please do not hesitate to contact me if you have any questions/concerns.     Sincerely,     Elin Jensen NP

## 2019-08-13 NOTE — IP AVS SNAPSHOT
MRN:[id number]                      After Visit Summary   8/13/2019    Everton Larios    MRN: [id number]           Visit Information        Department      8/13/2019 11:17 AM [address] [facility]          Review of your medicines      UNREVIEWED medicines. Ask your doctor about these medicines       Dose / Directions   acetaminophen 325 MG tablet  Commonly known as:  TYLENOL  Used for:  Heart replaced by transplant (H)      Dose:  650 mg  Take 2 tablets (650 mg) by mouth every 4 hours as needed for mild pain  Refills:  0     albuterol 108 (90 Base) MCG/ACT inhaler  Commonly known as:  PROAIR HFA/PROVENTIL HFA/VENTOLIN HFA  Used for:  Intermittent asthma without complication, unspecified asthma severity      Dose:  2 puff  Inhale 2 puffs into the lungs every 6 hours as needed for shortness of breath / dyspnea or wheezing  Refills:  0     amLODIPine 5 MG tablet  Commonly known as:  NORVASC  Used for:  Benign essential hypertension      Dose:  5 mg  Take 1 tablet (5 mg) by mouth daily  Quantity:  90 tablet  Refills:  0     aspirin 81 MG chewable tablet  Commonly known as:  ASA  Used for:  S/P orthotopic heart transplant (H)      Dose:  81 mg  Take 1 tablet (81 mg) by mouth daily  Refills:  0     balsalazide 750 MG capsule  Commonly known as:  COLAZAL      TAKE 3 CAPSULES BY MOUTH TWICE DAILY  Refills:  0     calcium carbonate 600 mg-vitamin D 400 units 600-400 MG-UNIT per tablet  Commonly known as:  CALTRATE  Used for:  Heart replaced by transplant (H)      Dose:  1 tablet  Take 1 tablet by mouth 2 times daily (with meals)  Refills:  0     cetirizine 10 MG tablet  Commonly known as:  zyrTEC      Dose:  10 mg  Take 10 mg by mouth daily  Refills:  0     DULoxetine 20 MG capsule  Commonly known as:  CYMBALTA      Dose:  20 mg  Take 20 mg by mouth daily  Refills:  0     fluticasone 220 MCG/ACT inhaler  Commonly known as:  FLOVENT HFA      Dose:  2 puff  Inhale 2 puffs into the lungs 2 times  daily  Refills:  0     fluticasone 50 MCG/ACT nasal spray  Commonly known as:  FLONASE      Dose:  1 spray  Spray 1 spray into both nostrils 2 times daily as needed  Refills:  0     levothyroxine 100 MCG tablet  Commonly known as:  SYNTHROID/LEVOTHROID      Dose:  100 mcg  Take 100 mcg by mouth daily  Refills:  0     magnesium oxide 400 MG tablet  Commonly known as:  MAG-OX  Used for:  Heart replaced by transplant (H)      Dose:  400 mg  Take 1 tablet (400 mg) by mouth 2 times daily  Quantity:  180 tablet  Refills:  3     melatonin 3 MG tablet  Used for:  Heart replaced by transplant (H)      Dose:  6 mg  Take 2 tablets (6 mg) by mouth At Bedtime  Refills:  0     multivitamin w/minerals tablet  Used for:  Heart replaced by transplant (H)      Dose:  1 tablet  Take 1 tablet by mouth daily  Refills:  0     mycophenolate 250 MG capsule  Commonly known as:  GENERIC EQUIVALENT  Used for:  Heart replaced by transplant (H)      Dose:  1500 mg  Take 6 capsules (1,500 mg) by mouth 2 times daily  Quantity:  360 capsule  Refills:  11     omeprazole 40 MG DR capsule  Commonly known as:  priLOSEC  Used for:  Nausea      Dose:  40 mg  Take 1 capsule (40 mg) by mouth daily  Refills:  0     polyethylene glycol powder  Commonly known as:  MIRALAX/GLYCOLAX      Dose:  1 capful  Take 1 capful by mouth daily  Refills:  0     potassium chloride ER 20 MEQ CR tablet  Commonly known as:  K-DUR/KLOR-CON M  Used for:  Heart replaced by transplant (H)      Dose:  20 mEq  Take 1 tablet (20 mEq) by mouth daily  Quantity:  120 tablet  Refills:  0     rosuvastatin 10 MG tablet  Commonly known as:  CRESTOR  Used for:  Heart replaced by transplant (H)      Dose:  10 mg  Take 1 tablet (10 mg) by mouth daily  Quantity:  90 tablet  Refills:  1     senna 8.6 MG tablet  Commonly known as:  SENOKOT      Dose:  2 tablet  Take 2 tablets by mouth 2 times daily as needed for constipation  Refills:  0     SINGULAIR 10 MG tablet  Used for:  Intermittent asthma  without complication, unspecified asthma severity  Generic drug:  montelukast      Dose:  10 mg  Take 1 tablet (10 mg) by mouth At Bedtime  Refills:  0     * tacrolimus 0.5 MG capsule  Commonly known as:  GENERIC EQUIVALENT  Used for:  Transplanted heart (H)      Pt currently not taking.  Quantity:  90 capsule  Refills:  3     * tacrolimus 1 MG capsule  Commonly known as:  GENERIC EQUIVALENT  Used for:  Transplanted heart (H)      Dose:  4 mg  Take 4 capsules (4 mg) by mouth 2 times daily  Quantity:  720 capsule  Refills:  3         * This list has 2 medication(s) that are the same as other medications prescribed for you. Read the directions carefully, and ask your doctor or other care provider to review them with you.                  Protect others around you: Learn how to safely use, store and throw away your medicines at www.disposemymeds.org.       Follow-ups after your visit       Your next 10 appointments already scheduled    Aug 13, 2019  1:30 PM CDT  Echo Complete with UCARYL  [facility], [place], Cardiac Services ([facility], [facility]) [address] [id number] [phone]   1. Please bring or wear a comfortable two-piece outfit.  2. You may eat, drink and take your normal medicines.  3. Please do not apply perfumes or lotions on the day of your exam.   4. For any questions that cannot be answered, please contact the ordering physician     Aug 13, 2019  2:30 PM CDT  (Arrive by 2:15 PM)  RETURN HEART TRANSPLANT with Elin Jensen NP  [facility] ([facility] [place]) [address]  [address]  [place] [id number] [phone]      Sep 17, 2019  3:00 PM CDT  (Arrive by 2:45 PM)  Return Visit with Ric Shukla MD  [facility] ([facility]) [address]  [address]  [place] [id number] [phone]         Care Instructions       Further  instructions from your care team       [facility]                        Interventional Cardiology  Discharge Instructions   Post Right Heart Cath      AFTER YOU GO HOME:    DO drink plenty of fluids    DO resume your regular diet and medications unless otherwise instructed by your Primary Physician    Do Not scrub the procedure site vigorously    No lotion or powder to the puncture site for 3 days    CALL YOUR PRIMARY PHYSICIAN IF: You may resume all normal activity.  Monitor neck site for bleeding, swelling, or voice changes. If you notice bleeding or swelling immediately apply pressure to the site and call number below to speak with Cardiology Fellow.  If you experience any changes in your breathing you should call your doctor immediately or come to the closest Emergency Department.  Do not drive yourself.    ADDITIONAL INSTRUCTIONS: Medications: You are to resume all home medications including anticoagulation therapy unless otherwise advised by your primary cardiologist or nurse coordinator.    Follow Up: Per your primary cardiology team    If you have any questions or concerns regarding your procedure site please call [phone] at anytime and ask for Cardiology Fellow on call.  They are available 24 hours a day.  You may also contact the Cardiology Clinic after hours number at [phone].                                                       Telephone Numbers [phone] Monday-Friday 8:00 am to 4:30 pm    [phone] After 4:30 pm Monday-Friday, Weekends & Holidays  Ask for Interventional Cardiologist on call. Someone is on call 24 hours/day   [facility] toll free number [phone] Monday-Friday 8:00 am to 4:30 pm   [facility] Emergency Dept [phone]                   Additional Information About Your Visit       [email or web address] Information    [employer] gives you secure access to your electronic health record. If you see a primary care provider, you can also send messages to your  care team and make appointments. If you have questions, please call your primary care clinic.  If you do not have a primary care provider, please call [phone] and they will assist you.       Care EveryWhere ID    This is your Care EveryWhere ID. This could be used by other organizations to access your [facility] medical records  [id number]       Your Vitals Were     Blood Pressure   110/66 (BP Location: Right arm, Cuff Size: Adult Regular)    Pulse   105    Temperature   98.2  F (36.8  C) (Oral)    Respirations   16    Pulse Oximetry   100%          Primary Care Provider Office Phone # Fax #    Fredrick J New [phone]      Equal Access to Services    BELIA GOLDBERG : Hadii aaron Morton, wasushil morton, qalisy kaalmada alfredo, dean rayo . So [facility] [phone].    ATENCIÓN: Si habla español, tiene a anaya disposición servicios gratuitos de asistencia lingüística. Llame al [phone].    We comply with applicable federal civil rights laws and [place] laws. We do not discriminate on the basis of race, color, national origin, age, disability, sex, sexual orientation, or gender identity.           Thank you!    Thank you for choosing [facility] for your care. Our goal is always to provide you with excellent care. Hearing back from our patients is one way we can continue to improve our services. Please take a few minutes to complete the written survey that you may receive in the mail after you visit with us. Thank you!            Medication List      ASK your doctor about these medications          Morning Afternoon Evening Bedtime As Needed    acetaminophen 325 MG tablet  Also known as:  TYLENOL  INSTRUCTIONS:  Take 2 tablets (650 mg) by mouth every 4 hours as needed for mild pain                     albuterol 108 (90 Base) MCG/ACT inhaler  Also known as:  PROAIR HFA/PROVENTIL HFA/VENTOLIN HFA  INSTRUCTIONS:  Inhale 2 puffs into the lungs every 6 hours as  needed for shortness of breath / dyspnea or wheezing                     amLODIPine 5 MG tablet  Also known as:  NORVASC  INSTRUCTIONS:  Take 1 tablet (5 mg) by mouth daily                     aspirin 81 MG chewable tablet  Also known as:  ASA  INSTRUCTIONS:  Take 1 tablet (81 mg) by mouth daily                     balsalazide 750 MG capsule  Also known as:  COLAZAL  INSTRUCTIONS:  TAKE 3 CAPSULES BY MOUTH TWICE DAILY                     calcium carbonate 600 mg-vitamin D 400 units 600-400 MG-UNIT per tablet  Also known as:  CALTRATE  INSTRUCTIONS:  Take 1 tablet by mouth 2 times daily (with meals)                     cetirizine 10 MG tablet  Also known as:  zyrTEC  INSTRUCTIONS:  Take 10 mg by mouth daily                     DULoxetine 20 MG capsule  Also known as:  CYMBALTA  INSTRUCTIONS:  Take 20 mg by mouth daily                     fluticasone 220 MCG/ACT inhaler  Also known as:  FLOVENT HFA  INSTRUCTIONS:  Inhale 2 puffs into the lungs 2 times daily                     fluticasone 50 MCG/ACT nasal spray  Also known as:  FLONASE  INSTRUCTIONS:  Spray 1 spray into both nostrils 2 times daily as needed                     levothyroxine 100 MCG tablet  Also known as:  SYNTHROID/LEVOTHROID  INSTRUCTIONS:  Take 100 mcg by mouth daily                     magnesium oxide 400 MG tablet  Also known as:  MAG-OX  INSTRUCTIONS:  Take 1 tablet (400 mg) by mouth 2 times daily  Doctor's comments:  Increase in dose                     melatonin 3 MG tablet  INSTRUCTIONS:  Take 2 tablets (6 mg) by mouth At Bedtime                     multivitamin w/minerals tablet  INSTRUCTIONS:  Take 1 tablet by mouth daily                     mycophenolate 250 MG capsule  Also known as:  GENERIC EQUIVALENT  INSTRUCTIONS:  Take 6 capsules (1,500 mg) by mouth 2 times daily                     omeprazole 40 MG DR capsule  Also known as:  priLOSEC  INSTRUCTIONS:  Take 1 capsule (40 mg) by mouth daily                     polyethylene glycol  powder  Also known as:  MIRALAX/GLYCOLAX  INSTRUCTIONS:  Take 1 capful by mouth daily                     potassium chloride ER 20 MEQ CR tablet  Also known as:  K-DUR/KLOR-CON M  INSTRUCTIONS:  Take 1 tablet (20 mEq) by mouth daily                     rosuvastatin 10 MG tablet  Also known as:  CRESTOR  INSTRUCTIONS:  Take 1 tablet (10 mg) by mouth daily                     senna 8.6 MG tablet  Also known as:  SENOKOT  INSTRUCTIONS:  Take 2 tablets by mouth 2 times daily as needed for constipation                     SINGULAIR 10 MG tablet  INSTRUCTIONS:  Take 1 tablet (10 mg) by mouth At Bedtime  Generic drug:  montelukast                     * tacrolimus 0.5 MG capsule  Also known as:  GENERIC EQUIVALENT  INSTRUCTIONS:  Pt currently not taking.                     * tacrolimus 1 MG capsule  Also known as:  GENERIC EQUIVALENT  INSTRUCTIONS:  Take 4 capsules (4 mg) by mouth 2 times daily  Doctor's comments:  Increase in dose                        * This list has 2 medication(s) that are the same as other medications prescribed for you. Read the directions carefully, and ask your doctor or other care provider to review them with you.

## 2019-08-13 NOTE — PROGRESS NOTES
ADULT HEART TRANSPLANT CLINIC  August 13, 2019    HPI:   Mr. Everton Larios is 54yr old male with a history of ASD (s/p repair in childhood), AFib/AF (s/p ablation), NICM, diastolic dysfunction, alcohol abuse, Pierce's esophagus, ulcerative colitis, GIB (s/p splenic embolization), and hypothyroidism, now s/p OHT and bypass of persistent left SVC to right atrial appendage 2/24/19, who presents to clinic for routine surveillance.     His postoperative course was c/b shock due to RV dysfunction requiring IABP support, small pneumoperitoneum (clinically insignificant), chest wall hematoma (former ICD site, s/p evacuation and drain placement 3/31/19), HCAP/klebsiella pneumonia, and FRANKLIN (required short-term HD, now recovered).  He was transferred to  rehab 4/3, and ultimately discharged to his local residence 4/15.     His biopsy 3/25/19 showed mild AMR1 with some staining for c4d.  Repeat biopsy 3/28 showed improved AMR and less reactive capillary staining, and subsequent biopsies have now shown resolution of AMR and improved capillary staining.  Baseline coronary angiogram has been deferred due to renal function.  His last echo 6/4 showed stable graft function with LVEF 55-60%, mild RV dilation with mildly reduced RV function, and mild-moderate tricuspid insufficiency (RVSP 29.4 +RAP).     He had a fall in early April, and was found to have a compression fracture.    He was hospitalized 4/28-5/9 with fevers, URI symptoms, abdominal pain, and diarrhea, and was found to be neutropenic with pseudomonas bacteremia and HCAP.  Chest CT showed diffuse bilateral solid with peripheral groundglass pulmonary nodules/opacities and mediastinal adenopathy, BAL grew pseudomonas, and fungitell was positive.  Transplant ID was consulted, and he was treated with 4 weeks of IV cefepime and micafungin.  Repeat CT 5/22 showed significantly decreased bilateral nodular and consolidative opacities.     He represented to [facility] 5/24 with FRANKLIN  (creatinine 3.43), thought to be secondary to poor intake and self-adjusting diuretics.  He was given 2L NS and 1L LR, with creatinine trending down upon discharge 5/26.    Since his last visit, he states that he feels well overall.  He continues to note back pain, plantar fascitis, calf pain, and achilles pain -- all of which has been ongoing and not worsened.  He also notes a lot of musculoskeletal pain throughout his body.  He has been doing PT exercises, which has helped some and he has noted mild improvement.  He has always had trouble standing for extended periods of time.  He continues to do cardiac rehab once/week, which is going well.  He is walking regularly, and notes good strength and endurance.  His breathing status has been stable, and he denies SOB, PND, and orthopnea.  He has been constipated, and followed at an outside GI clinic who recommended that he start linzess.  He developed uncontrolled diarrhea 3 days after starting linzess, so he stopped taking it.  He is now using miralax.  He notes that the GI provider he saw recommended repeating a colonoscopy given his h/o UC.  He notes that when he is constipated, he is nauseated, bloated, and can have some vomiting.  He is hydrating well.  His weight is usually stable, and he does not feel any fluid retention.  His BPs remain stable.  He otherwise denies chest pain, palpitations, dizziness, headaches, acute vision changes, fevers, chills, cough, and sore throat.      Serostatus:  CMV D+/R+  EBV D+/R+    Patient Active Problem List    Diagnosis Date Noted     Transplanted heart (H) 05/30/2019     Priority: Medium     Added automatically from request for surgery [id number]       Central line clotted (H) 05/24/2019     Priority: Medium     FRANKLIN (acute kidney injury) (H) 05/24/2019     Priority: Medium     Sepsis due to Pseudomonas aeruginosa (H) 04/30/2019     Priority: Medium     Pulmonary nodules 04/30/2019     Priority: Medium     Compression fracture of  thoracic vertebra (H) 04/30/2019     Priority: Medium     Abnormal white blood cell (WBC) count 04/26/2019     Priority: Medium     Debility 04/03/2019     Priority: Medium     Heart replaced by transplant (H) 02/24/2019     Priority: Medium     Donor CMV positive/ Recipient CMV positive.   Donor EBV positive/ Recipient EBV positive.     Donor NOT PHS higher risk          Iron deficiency anemia 10/18/2018     Priority: Medium     H/O congenital atrial septal defect (ASD) repair at age 3 10/04/2018     Priority: Medium     Ulcerative colitis (H) 10/04/2018     Priority: Medium     Hypothyroidism due to medication 10/04/2018     Priority: Medium     DJD (degenerative joint disease) - neck 10/04/2018     Priority: Medium     Pierce's esophagus 10/04/2018     Priority: Medium     Intermittent asthma without complication 10/04/2018     Priority: Medium     Chronic rhinitis 10/04/2018     Priority: Medium     Depression 10/04/2018     Priority: Medium       PAST MEDICAL HISTORY:  Past Medical History:   Diagnosis Date     Alcohol abuse      Pierce's esophagus 10/4/2018     Chronic rhinitis 10/4/2018     Chronic systolic heart failure (H) 10/4/2018     Depression 10/4/2018     Diastolic dysfunction      DJD (degenerative joint disease) - neck 10/4/2018     H/O congenital atrial septal defect (ASD) repair at age 3 10/4/2018     Hypothyroidism due to medication 10/4/2018     ICD, [place] [employer] 2008; gen change 2/2018 10/4/2018     Intermittent asthma without complication 10/4/2018     Nonischemic cardiomyopathy (H) 10/4/2018     On amiodarone therapy 10/4/2018     Paroxysmal atrial fibrillation (H) 10/4/2018     S/P ablation of atrial fibrillation 10/4/2018     Systolic heart failure (H)      Ulcerative colitis (H) 10/4/2018     Ventricular tachycardia (H) 10/4/2018       CURRENT MEDICATIONS:  Prescription Medications as of 8/13/2019       Rx Number Disp Refills Start End Last Dispensed Date Next Fill Date Owning  Pharmacy    acetaminophen (TYLENOL) 325 MG tablet    4/1/2019        Sig: Take 2 tablets (650 mg) by mouth every 4 hours as needed for mild pain    Class: Transitional    Route: Oral    amLODIPine (NORVASC) 5 MG tablet  90 tablet 0 6/27/2019    [facility] [id number] [place] [address]    Sig: Take 1 tablet (5 mg) by mouth daily    Class: E-Prescribe    Route: Oral    aspirin (ASA) 81 MG chewable tablet    4/2/2019        Sig: Take 1 tablet (81 mg) by mouth daily    Class: Transitional    Route: Oral    calcium carbonate 600 mg-vitamin D 400 units (CALTRATE) 600-400 MG-UNIT per tablet    4/1/2019        Sig: Take 1 tablet by mouth 2 times daily (with meals)    Class: Transitional    Route: Oral    cetirizine (ZYRTEC) 10 MG tablet        [facility] [id number] [place] [address]    Sig: Take 10 mg by mouth daily    Class: Historical    Route: Oral    DULoxetine (CYMBALTA) 20 MG EC capsule            Sig: Take 20 mg by mouth daily    Class: Historical    Route: Oral    fluticasone (FLONASE) 50 MCG/ACT nasal spray            Sig: Spray 1 spray into both nostrils 2 times daily as needed     Class: Historical    Route: Both Nostrils    levothyroxine (SYNTHROID/LEVOTHROID) 100 MCG tablet            Sig: Take 100 mcg by mouth daily     Class: Historical    Route: Oral    magnesium oxide (MAG-OX) 400 MG tablet  180 tablet 3 4/19/2019    [facility] [id number] [place] [address]    Sig: Take 1 tablet (400 mg) by mouth 2 times daily    Class: E-Prescribe    Notes to Pharmacy: Increase in dose    Route: Oral    melatonin 3 MG tablet    4/1/2019        Sig: Take 2 tablets (6 mg) by mouth At Bedtime    Class: Transitional    Route: Oral    montelukast (SINGULAIR) 10 MG tablet    10/4/2018        Sig: Take 1 tablet (10 mg) by mouth At Bedtime    Class: Historical    Route: Oral    multivitamin w/minerals (THERA-VIT-M) tablet    4/12/2019        Sig: Take 1 tablet by mouth daily     Class: OTC    Route: Oral    mycophenolate (GENERIC EQUIVALENT) 250 MG capsule  360 capsule 11 5/24/2019    [facility] [id number] [place] [address]    Sig: Take 6 capsules (1,500 mg) by mouth 2 times daily    Class: E-Prescribe    Route: Oral    omeprazole (PRILOSEC) 40 MG DR capsule    4/26/2019    [facility] [id number] [place] [address]    Sig: Take 1 capsule (40 mg) by mouth daily    Class: Historical    Route: Oral    polyethylene glycol (MIRALAX/GLYCOLAX) powder            Sig: Take 1 capful by mouth daily    Class: Historical    Route: Oral    potassium chloride ER (K-DUR/KLOR-CON M) 20 MEQ CR tablet  120 tablet 0 5/24/2019    [id number] [place] [address]    Sig: Take 1 tablet (20 mEq) by mouth daily    Class: E-Prescribe    Route: Oral    rosuvastatin (CRESTOR) 10 MG tablet  90 tablet 1 5/20/2019    [id number] [place] [address]    Sig: Take 1 tablet (10 mg) by mouth daily    Class: E-Prescribe    Route: Oral    tacrolimus (GENERIC EQUIVALENT) 1 MG capsule  720 capsule 3 8/2/2019    [id number] [place] [address]    Sig: Take 4 capsules (4 mg) by mouth 2 times daily    Class: E-Prescribe    Notes to Pharmacy: Increase in dose    Route: Oral    albuterol (PROAIR HFA/PROVENTIL HFA/VENTOLIN HFA) 108 (90 Base) MCG/ACT inhaler    10/4/2018        Sig: Inhale 2 puffs into the lungs every 6 hours as needed for shortness of breath / dyspnea or wheezing    Class: Historical    Route: Inhalation    balsalazide (COLAZAL) 750 MG capsule    11/9/2018    [id number] [place] [address]    Sig: TAKE 3 CAPSULES BY MOUTH TWICE DAILY    Class: Historical    fluticasone (FLOVENT HFA) 220 MCG/ACT Inhaler            Sig: Inhale 2 puffs into the lungs 2 times daily    Class: Historical    Route: Inhalation    senna (SENOKOT) 8.6 MG tablet            Sig: Take 2 tablets by mouth 2 times  "daily as needed for constipation    Class: Historical    Route: Oral    tacrolimus (GENERIC EQUIVALENT) 0.5 MG capsule  90 capsule 3 7/18/2019    [id number] [place] [facility], [address]    Sig: Pt currently not taking.    Class: E-Prescribe      Hospital Medications as of 8/13/2019       Dose Frequency Start End    lidocaine (LMX4) cream (Completed)  ONCE PRN 8/13/2019 8/13/2019    Sig: Apply topically once as needed for mild pain    Class: E-Prescribe    Route: Topical          ROS:   Constitutional: No fever, chills, or sweats. Stable weight.   ENT: No visual disturbance, ear ache, epistaxis, sore throat.   Allergies/Immunologic: Negative.   Respiratory: As per HPI.   Cardiovascular: As per HPI.   GI: As per HPI.  : No urinary frequency, dysuria, or hematuria.   Integument: Negative.   Psychiatric: Negative.   Neuro: Negative.   Endocrinology: Negative.   Musculoskeletal: As per HPI.    Exam:  /68 (BP Location: Right arm, Patient Position: Chair, Cuff Size: Adult Regular)   Pulse 113   Ht 1.727 m (5' 8\")   Wt 60.1 kg (132 lb 9.6 oz)   SpO2 100%   BMI 20.16 kg/m    In general, the patient is a pleasant male in no apparent distress.    HEENT: NC/AT. PERRLA. EOMI.  Sclerae white, not injected.    Neck:  No adenopathy, No thyromegaly.    COR: No jugular venous distention when sitting upright.  RRR.  Normal S1 S2 splits physiologically.  No murmur, rub click, or gallop.    Lungs:  CTA. No rhonchi.    Abdomen: soft, nontender, nondistended.  No organomegaly.  Extremities:  No clubbing, cyanosis, or LE edema.    Neuro: Alert & Oriented x 3, grossly non focal.  Integument: no open lesions, rashes, or jaundice.    Labs:  CBC RESULTS:   Lab Results   Component Value Date    WBC 3.6 (L) 08/13/2019    RBC 3.41 (L) 08/13/2019    HGB 9.2 (L) 08/13/2019    HCT 30.3 (L) 08/13/2019    MCV 89 08/13/2019    MCH 27.0 08/13/2019    MCHC 30.4 (L) 08/13/2019    RDW 13.5 08/13/2019     08/13/2019 "       BMP RESULTS:  Lab Results   Component Value Date     08/13/2019    POTASSIUM 4.6 08/13/2019    CHLORIDE 106 08/13/2019    CO2 22 08/13/2019    ANIONGAP 10 08/13/2019    GLC 80 08/13/2019    BUN 32 (H) 08/13/2019    CR 1.26 (H) 08/13/2019    GFRESTIMATED 63 08/13/2019    GFRESTBLACK 73 08/13/2019    RADAMES 9.2 08/13/2019      LIPID RESULTS:  Lab Results   Component Value Date    CHOL 204 (H) 05/22/2019    HDL 85 05/22/2019    LDL 84 05/22/2019    TRIG 175 (H) 05/22/2019    NHDL 119 05/22/2019       IMMUNOSUPPRESSANT LEVELS  Lab Results   Component Value Date    TACROL 9.8 08/02/2019    DOSTAC 08/01/19 2045 08/02/2019       No components found for: CK  Lab Results   Component Value Date    MAG 1.7 07/17/2019     Lab Results   Component Value Date    A1C 5.7 (H) 02/23/2019     Lab Results   Component Value Date    PHOS 4.0 07/17/2019     Lab Results   Component Value Date    NTBNPI 45,480 (H) 03/27/2019     Lab Results   Component Value Date    SAITESTMET [facility] 05/22/2019    SAICELL Class I 05/22/2019    [id number] None 05/22/2019    [id number] None 05/22/2019    SAIREPCOM  05/22/2019      Test performed by modified procedure. Serum heat inactivated and tested   by a modified (Barto) protocol including fetal calf serum addition.   High-risk, mfi >3,000. Mod-risk, mfi 500-3,000.       Lab Results   Component Value Date    SAIITESTME [facility] 05/22/2019    SAIICELL Class II 05/22/2019    [id number] None 05/22/2019    [id number] None 05/22/2019    SAIIREPCOM  05/22/2019      Test performed by modified procedure. Serum heat inactivated and tested   by a modified (Barto) protocol including fetal calf serum addition.   High-risk, mfi >3,000. Mod-risk, mfi 500-3,000.       Lab Results   Component Value Date    CSPEC EDTA PLASMA 07/24/2019       Assessment and Plan:  Mr. Everton Larios is 54yr old male with a history of ASD (s/p repair in childhood), AFib/AF (s/p ablation), NICM, diastolic dysfunction, alcohol abuse,  Pierce's esophagus, ulcerative colitis, GIB (s/p splenic embolization), and hypothyroidism, now s/p OHT and bypass of persistent left SVC to right atrial appendage 2/24/19, who presents to clinic for routine surveillance.    NICM, s/p OHT and bypass of persistent left SVC to right atrial appendage 2/24/19  His postoperative course was c/b shock due to RV dysfunction requiring IABP support, small pneumoperitoneum (clinically insignificant), chest wall hematoma (former ICD site, s/p evacuation and drain placement 3/31/19), HCAP/klebsiella pneumonia, and FRANKLIN (required short-term HD, now recovered).  He was transferred to  rehab 4/3, and ultimately discharged to his local residence 4/15.     His biopsy 3/25/19 showed mild AMR1 with some staining for c4d.  Repeat biopsy 3/28 showed improved AMR and less reactive capillary staining, and subsequent biopsies have now shown resolution of AMR and improved capillary staining.  Baseline coronary angiogram has been deferred due to renal function.  His last echo 6/4 showed stable graft function with LVEF 55-60%, mild RV dilation with mildly reduced RV function, and mild-moderate tricuspid insufficiency (RVSP 29.4 +RAP).     He had a fall in early April, and was found to have a compression fracture.    He was hospitalized 4/28-5/9 with fevers, URI symptoms, abdominal pain, and diarrhea, and was found to be neutropenic with pseudomonas bacteremia and HCAP.  Chest CT showed diffuse bilateral solid with peripheral groundglass pulmonary nodules/opacities and mediastinal adenopathy, BAL grew pseudomonas, and fungitell was positive.  Transplant ID was consulted, and he was treated with 4 weeks of IV cefepime and micafungin.  Repeat CT 5/22 showed significantly decreased bilateral nodular and consolidative opacities.     He represented to [facility] 5/24 with FRANKLIN (creatinine 3.43), thought to be secondary to poor intake and self-adjusting diuretics.  He was given 2L NS and 1L LR, with  creatinine trending down upon discharge 5/26.    Labs today show stable electrolytes, kidney function, and blood counts.  Tacro level pending.  Echo results from today pending.    Mr. Larios appears well today.  His weight has remained stable, and he appears euvolemic.  His BPs remain stable.  Advised that he continue his current medications, and call with any new concerns.      Serostatus:  - CMV D+/R+  - EBV D+/R+     Immunosuppression:  - tacrolimus, goal level 8-10.  Level from today pending, will reflect a 14hr trough.  - MMF 1500mg twice daily     Graft function:  - BPs:  Stable, remain <140/90.  Continue amlodipine 5mg daily.  - HRs:  Stable, remain >80  - fluid status:  Euvolemic, off diuretics.  Discussed adequate po intake.      PPx:  - CAV:  Aspirin 81mg daily and rosuvastatin 10mg daily  - PCP:  pentamidine   - GERD:  Omeprazole (note h/o carrera's esophagus)  - CMV:  completed valcyte.  - Osteoporosis:  Calcium/vitamin D supplements    Health maintenance:  - referral to  to discuss finances  - endocrine referral re: bone density  - GI referral re: h/o UC and chronic constipation, and possible need for colonoscopy  - continue PT exercises and CR        Right chest wall hematoma  SVC grafting  Hematoma developed at former ICD site, and he is now s/p evacuation 3/31. SVC graft reportedly patent, so will need to monitor for increased swelling in left upper extremity.  Defer need for anticoagulation to Dr. Donis.      Leukopenia, resolved  Neutropenia, resolved  CBC stable, will continue to trend.  Plan to repeat CBC with diff in 1 month given slight decrease in WBC today.        The above was reviewed with Mr. Larios, who verbalized understanding and will call with further questions/concerns.     30 minutes spent face-to-face with patient, with >50% in counseling and/or coordination of care as described above.        Elin Jensen, DNP, APRN, FNP-BC  Advanced Heart Failure Nurse  Practitioner  [facility]  Patient Care Team:  Fredrick Garcia as PCP - General (Internal Medicine)  Jocelynn Jerez RN as Transplant Coordinator  Ric Shukla MD as MD (Infectious Diseases)  FREDRICK GARCIA

## 2019-08-13 NOTE — NURSING NOTE
Chief Complaint   Patient presents with     Follow Up     return     Vitals were taken and medications were reconciled.   Nicole Camp  2:21 PM

## 2019-08-13 NOTE — DISCHARGE INSTRUCTIONS
[facility]                        Interventional Cardiology  Discharge Instructions   Post Right Heart Cath      AFTER YOU GO HOME:    DO drink plenty of fluids    DO resume your regular diet and medications unless otherwise instructed by your Primary Physician    Do Not scrub the procedure site vigorously    No lotion or powder to the puncture site for 3 days    CALL YOUR PRIMARY PHYSICIAN IF: You may resume all normal activity.  Monitor neck site for bleeding, swelling, or voice changes. If you notice bleeding or swelling immediately apply pressure to the site and call number below to speak with Cardiology Fellow.  If you experience any changes in your breathing you should call your doctor immediately or come to the closest Emergency Department.  Do not drive yourself.    ADDITIONAL INSTRUCTIONS: Medications: You are to resume all home medications including anticoagulation therapy unless otherwise advised by your primary cardiologist or nurse coordinator.    Follow Up: Per your primary cardiology team    If you have any questions or concerns regarding your procedure site please call [phone] at anytime and ask for Cardiology Fellow on call.  They are available 24 hours a day.  You may also contact the Cardiology Clinic after hours number at [phone].                                                       Telephone Numbers [phone] Monday-Friday 8:00 am to 4:30 pm    [phone] After 4:30 pm Monday-Friday, Weekends & Holidays  Ask for Interventional Cardiologist on call. Someone is on call 24 hours/day   [facility] toll free number [phone] Monday-Friday 8:00 am to 4:30 pm   [facility] Emergency Dept [phone]

## 2019-08-14 ENCOUNTER — TELEPHONE (OUTPATIENT)
Dept: TRANSPLANT | Facility: CLINIC | Age: 54
End: 2019-08-14

## 2019-08-14 ENCOUNTER — COMMUNICATION - HEALTHEAST (OUTPATIENT)
Dept: INTERNAL MEDICINE | Facility: CLINIC | Age: 54
End: 2019-08-14

## 2019-08-14 DIAGNOSIS — E03.9 HYPOTHYROIDISM, UNSPECIFIED TYPE: ICD-10-CM

## 2019-08-14 LAB — COPATH REPORT: NORMAL

## 2019-08-14 RX ORDER — POTASSIUM CHLORIDE 1500 MG/1
TABLET, EXTENDED RELEASE ORAL
Qty: 90 TABLET | Refills: 1 | Status: SHIPPED | OUTPATIENT
Start: 2019-08-14 | End: 2019-10-03

## 2019-08-14 NOTE — TELEPHONE ENCOUNTER
Heart biopsy negative.     Tacro level 7.5. Goal 8-10. 14 hour trough. Current dose 4 mg bid. No changes at this time. Will repeat at next office visit.     Message relayed to Everton cifuentes: no financial assistance available at this time. If he starts to have difficulty with medication costs, please let us know.     Pt verbalized an understanding with the above plan.

## 2019-08-15 ENCOUNTER — TELEPHONE (OUTPATIENT)
Dept: GASTROENTEROLOGY | Facility: CLINIC | Age: 54
End: 2019-08-15

## 2019-08-15 ENCOUNTER — TELEPHONE (OUTPATIENT)
Dept: TRANSPLANT | Facility: CLINIC | Age: 54
End: 2019-08-15

## 2019-08-15 ENCOUNTER — AMBULATORY - HEALTHEAST (OUTPATIENT)
Dept: CARDIAC REHAB | Facility: HOSPITAL | Age: 54
End: 2019-08-15

## 2019-08-15 DIAGNOSIS — Z94.1 STATUS POST HEART TRANSPLANTATION (H): ICD-10-CM

## 2019-08-15 NOTE — TELEPHONE ENCOUNTER
[facility] Call Center    Phone Message    May a detailed message be left on voicemail: yes    Reason for Call: Other: Patient has a diagnosis of colitits and constipation;;he is being referred by  Jennifer Jensen NP from the Transplant team. Patient is a heart transplant patient//He has a history with [facility]//The protocol's flow chart says we can't see him and he should return to his previous GI provider. However, Jocelynn from Transplant is wondering if we can make an exception in this case. Please call Jocelynn at [phone] to advise on this case. Thanks.     Action Taken: Message routed to:  [facility] ([facility] Gastro

## 2019-08-15 NOTE — TELEPHONE ENCOUNTER
Endocrine referral appt made for soonest available which is 10/22.     GI referral appt attempted to be made but they said they are full and patient should see who he's seen in the past. Pt doesn't feel comfortable with this team and is looking to establish care with [facility].  stated they would put a message in and get back to us.     Message sent to transplant schedulers to schedule Everton's 8 month post heart follow up.     Pt updated and agrees with plan of care.

## 2019-08-15 NOTE — TELEPHONE ENCOUNTER
Called patient gave him an appointment to see Dr. Jocelynn eJnsen in fellow clinic for his colitis and constipation.

## 2019-08-16 NOTE — TELEPHONE ENCOUNTER
Pt request a call Monday 8/19/19 to make his appts- pt is currently busy and has meetings all afternoon.

## 2019-08-19 ENCOUNTER — PRE VISIT (OUTPATIENT)
Dept: TRANSPLANT | Facility: CLINIC | Age: 54
End: 2019-08-19

## 2019-08-19 DIAGNOSIS — Z94.1 TRANSPLANTED HEART (H): Primary | ICD-10-CM

## 2019-08-19 RX ORDER — LIDOCAINE 40 MG/G
CREAM TOPICAL
Status: CANCELLED | OUTPATIENT
Start: 2019-08-19

## 2019-08-19 RX ORDER — SODIUM CHLORIDE 9 MG/ML
INJECTION, SOLUTION INTRAVENOUS CONTINUOUS
Status: CANCELLED | OUTPATIENT
Start: 2019-08-19

## 2019-08-19 RX ORDER — POTASSIUM CHLORIDE 1500 MG/1
40 TABLET, EXTENDED RELEASE ORAL
Status: CANCELLED | OUTPATIENT
Start: 2019-08-19

## 2019-08-19 RX ORDER — POTASSIUM CHLORIDE 1500 MG/1
20 TABLET, EXTENDED RELEASE ORAL
Status: CANCELLED | OUTPATIENT
Start: 2019-08-19

## 2019-08-19 NOTE — TELEPHONE ENCOUNTER
Pt would like to come 10/15/19 & 10/16/19 to do his cath lab appt & see his provider w/ additional testing for his appts.

## 2019-08-22 ENCOUNTER — RECORDS - HEALTHEAST (OUTPATIENT)
Dept: HEALTH INFORMATION MANAGEMENT | Facility: CLINIC | Age: 54
End: 2019-08-22

## 2019-08-22 NOTE — TELEPHONE ENCOUNTER
RECORDS RECEIVED FROM: [facility]   DATE RECEIVED: 8/22/19   NOTES (FOR ALL VISITS) STATUS DETAILS   OFFICE NOTES from referring provider Elin Burton NP - OV 8/13/19   OFFICE NOTES from other specialist Epic/    ED NOTES Epic/ 4/21/19   OPERATIVE REPORT  (thyroid, pituitary, adrenal, parathyroid) NA    MEDICATION LIST Epic 8/14/19   IMAGING      DEXASCAN     MRI (BRAIN)     XR (Chest)     CT (HEAD/NECK/CHEST/ABDOMEN)     NUCLEAR      ULTRASOUND (HEAD/NECK)     LABS     DIABETES: HBGA1C, CREATININE, FASTING LIPIDS, MICROALBUMIN URINE, POTASSIUM, TSH, T4    THYROID: TSH, T4, CBC, THYRODLONULIN, TOTAL T3, FREE T4, CALCITONIN, CEA [facility] 8/22/19

## 2019-08-24 DIAGNOSIS — I10 BENIGN ESSENTIAL HYPERTENSION: ICD-10-CM

## 2019-08-27 RX ORDER — AMLODIPINE BESYLATE 5 MG/1
TABLET ORAL
Qty: 30 TABLET | Refills: 2 | Status: SHIPPED | OUTPATIENT
Start: 2019-08-27 | End: 2019-12-10

## 2019-09-09 DIAGNOSIS — Z94.1 TRANSPLANTED HEART (H): Primary | ICD-10-CM

## 2019-09-09 ASSESSMENT — ENCOUNTER SYMPTOMS
ALTERED TEMPERATURE REGULATION: 1
CHILLS: 1
SORE THROAT: 1
MYALGIAS: 1
EYE REDNESS: 0
CHILLS: 1
CONSTIPATION: 0
ABDOMINAL PAIN: 1
POLYPHAGIA: 0
STIFFNESS: 1
EYE WATERING: 1
FEVER: 0
SORE THROAT: 1
NERVOUS/ANXIOUS: 0
MUSCLE WEAKNESS: 1
WEIGHT LOSS: 1
ARTHRALGIAS: 1
VOMITING: 1
INCREASED ENERGY: 1
PANIC: 0
CONSTIPATION: 0
FATIGUE: 1
PANIC: 0
DECREASED APPETITE: 1
ALTERED TEMPERATURE REGULATION: 1
VOMITING: 1
POLYPHAGIA: 0
FEVER: 0
SMELL DISTURBANCE: 1
TASTE DISTURBANCE: 1
MUSCLE CRAMPS: 1
EYE WATERING: 1
ABDOMINAL PAIN: 1
JAUNDICE: 0
BLOATING: 0
STIFFNESS: 1
INSOMNIA: 1
HALLUCINATIONS: 0
TROUBLE SWALLOWING: 0
DOUBLE VISION: 0
HALLUCINATIONS: 0
POLYDIPSIA: 0
BACK PAIN: 1
HOARSE VOICE: 1
WEIGHT GAIN: 0
FATIGUE: 1
POLYDIPSIA: 0
TROUBLE SWALLOWING: 0
NECK MASS: 0
BACK PAIN: 1
SINUS CONGESTION: 1
EYE IRRITATION: 0
BLOOD IN STOOL: 0
BOWEL INCONTINENCE: 0
BLOOD IN STOOL: 0
DEPRESSION: 1
RECTAL PAIN: 0
DEPRESSION: 1
INSOMNIA: 1
DOUBLE VISION: 0
WEIGHT GAIN: 0
RECTAL PAIN: 0
EYE PAIN: 0
DECREASED CONCENTRATION: 0
DIARRHEA: 1
SINUS CONGESTION: 1
BOWEL INCONTINENCE: 0
DIARRHEA: 1
JAUNDICE: 0
ARTHRALGIAS: 1
NAUSEA: 1
TASTE DISTURBANCE: 1
EYE PAIN: 0
NIGHT SWEATS: 0
HEARTBURN: 1
NIGHT SWEATS: 0
MUSCLE WEAKNESS: 1
EYE REDNESS: 0
BLOATING: 0
NECK MASS: 0
MYALGIAS: 1
HEARTBURN: 1
SMELL DISTURBANCE: 1
DECREASED APPETITE: 1
WEIGHT LOSS: 1
MUSCLE CRAMPS: 1
HOARSE VOICE: 1
INCREASED ENERGY: 1
NERVOUS/ANXIOUS: 0
DECREASED CONCENTRATION: 0
EYE IRRITATION: 0
NAUSEA: 1

## 2019-09-12 ENCOUNTER — TELEPHONE (OUTPATIENT)
Dept: GASTROENTEROLOGY | Facility: CLINIC | Age: 54
End: 2019-09-12

## 2019-09-17 ENCOUNTER — RECORDS - HEALTHEAST (OUTPATIENT)
Dept: ADMINISTRATIVE | Facility: OTHER | Age: 54
End: 2019-09-17

## 2019-09-17 ENCOUNTER — OFFICE VISIT (OUTPATIENT)
Dept: GASTROENTEROLOGY | Facility: CLINIC | Age: 54
End: 2019-09-17
Payer: COMMERCIAL

## 2019-09-17 ENCOUNTER — OFFICE VISIT (OUTPATIENT)
Dept: INFECTIOUS DISEASES | Facility: CLINIC | Age: 54
End: 2019-09-17
Attending: INTERNAL MEDICINE
Payer: COMMERCIAL

## 2019-09-17 ENCOUNTER — TELEPHONE (OUTPATIENT)
Dept: GASTROENTEROLOGY | Facility: CLINIC | Age: 54
End: 2019-09-17

## 2019-09-17 VITALS
OXYGEN SATURATION: 100 % | SYSTOLIC BLOOD PRESSURE: 112 MMHG | HEART RATE: 99 BPM | BODY MASS INDEX: 18.82 KG/M2 | DIASTOLIC BLOOD PRESSURE: 75 MMHG | WEIGHT: 124.2 LBS | RESPIRATION RATE: 20 BRPM | TEMPERATURE: 98.5 F | HEIGHT: 68 IN

## 2019-09-17 VITALS
HEART RATE: 99 BPM | TEMPERATURE: 98.5 F | BODY MASS INDEX: 18.89 KG/M2 | OXYGEN SATURATION: 100 % | SYSTOLIC BLOOD PRESSURE: 112 MMHG | WEIGHT: 123.9 LBS | DIASTOLIC BLOOD PRESSURE: 75 MMHG

## 2019-09-17 DIAGNOSIS — Z23 NEED FOR SHINGLES VACCINE: ICD-10-CM

## 2019-09-17 DIAGNOSIS — Z12.5 PROSTATE CANCER SCREENING: ICD-10-CM

## 2019-09-17 DIAGNOSIS — Z13.29 THYROID DISORDER SCREENING: ICD-10-CM

## 2019-09-17 DIAGNOSIS — R63.4 LOSS OF WEIGHT: ICD-10-CM

## 2019-09-17 DIAGNOSIS — B25.9 CYTOMEGALOVIRUS (CMV) VIREMIA (H): Primary | ICD-10-CM

## 2019-09-17 DIAGNOSIS — K51.00 ULCERATIVE PANCOLITIS (H): ICD-10-CM

## 2019-09-17 DIAGNOSIS — J15.1 PNEUMONIA OF BOTH LUNGS DUE TO PSEUDOMONAS SPECIES, UNSPECIFIED PART OF LUNG (H): ICD-10-CM

## 2019-09-17 DIAGNOSIS — A41.52 SEPSIS DUE TO PSEUDOMONAS AERUGINOSA (H): ICD-10-CM

## 2019-09-17 DIAGNOSIS — D72.9 ABNORMAL WHITE BLOOD CELL (WBC) COUNT: ICD-10-CM

## 2019-09-17 DIAGNOSIS — Z94.1 TRANSPLANTED HEART (H): ICD-10-CM

## 2019-09-17 DIAGNOSIS — Z13.220 LIPID SCREENING: ICD-10-CM

## 2019-09-17 DIAGNOSIS — K51.00 ULCERATIVE PANCOLITIS (H): Primary | ICD-10-CM

## 2019-09-17 DIAGNOSIS — R91.8 PULMONARY NODULES: ICD-10-CM

## 2019-09-17 DIAGNOSIS — Z94.1 HEART REPLACED BY TRANSPLANT (H): ICD-10-CM

## 2019-09-17 DIAGNOSIS — R19.7 DIARRHEA OF PRESUMED INFECTIOUS ORIGIN: ICD-10-CM

## 2019-09-17 LAB
ALBUMIN SERPL-MCNC: 3.6 G/DL (ref 3.4–5)
ALP SERPL-CCNC: 285 U/L (ref 40–150)
ALT SERPL W P-5'-P-CCNC: 23 U/L (ref 0–70)
ANION GAP SERPL CALCULATED.3IONS-SCNC: 8 MMOL/L (ref 3–14)
AST SERPL W P-5'-P-CCNC: 16 U/L (ref 0–45)
BASOPHILS # BLD AUTO: 0 10E9/L (ref 0–0.2)
BASOPHILS # BLD AUTO: 0 10E9/L (ref 0–0.2)
BASOPHILS NFR BLD AUTO: 0.5 %
BASOPHILS NFR BLD AUTO: 0.9 %
BILIRUB SERPL-MCNC: 0.3 MG/DL (ref 0.2–1.3)
BUN SERPL-MCNC: 37 MG/DL (ref 7–30)
CALCIUM SERPL-MCNC: 9.1 MG/DL (ref 8.5–10.1)
CHLORIDE SERPL-SCNC: 110 MMOL/L (ref 94–109)
CHOLEST SERPL-MCNC: 152 MG/DL
CMV DNA SPEC NAA+PROBE-ACNC: 7374 [IU]/ML
CMV DNA SPEC NAA+PROBE-LOG#: 3.9 {LOG_IU}/ML
CO2 SERPL-SCNC: 18 MMOL/L (ref 20–32)
CREAT SERPL-MCNC: 2.02 MG/DL (ref 0.66–1.25)
CRP SERPL-MCNC: <2.9 MG/L (ref 0–8)
DIFFERENTIAL METHOD BLD: ABNORMAL
DIFFERENTIAL METHOD BLD: ABNORMAL
EOSINOPHIL # BLD AUTO: 0.3 10E9/L (ref 0–0.7)
EOSINOPHIL # BLD AUTO: 0.3 10E9/L (ref 0–0.7)
EOSINOPHIL NFR BLD AUTO: 7.2 %
EOSINOPHIL NFR BLD AUTO: 7.8 %
ERYTHROCYTE [DISTWIDTH] IN BLOOD BY AUTOMATED COUNT: 14 % (ref 10–15)
ERYTHROCYTE [DISTWIDTH] IN BLOOD BY AUTOMATED COUNT: 14.2 % (ref 10–15)
ERYTHROCYTE [SEDIMENTATION RATE] IN BLOOD BY WESTERGREN METHOD: 18 MM/H (ref 0–20)
GFR SERPL CREATININE-BSD FRML MDRD: 36 ML/MIN/{1.73_M2}
GLUCOSE SERPL-MCNC: 90 MG/DL (ref 70–99)
HCT VFR BLD AUTO: 28.5 % (ref 40–53)
HCT VFR BLD AUTO: 32.8 % (ref 40–53)
HDLC SERPL-MCNC: 48 MG/DL
HGB BLD-MCNC: 10.1 G/DL (ref 13.3–17.7)
HGB BLD-MCNC: 9.5 G/DL (ref 13.3–17.7)
IMM GRANULOCYTES # BLD: 0 10E9/L (ref 0–0.4)
IMM GRANULOCYTES NFR BLD: 0.5 %
LDLC SERPL CALC-MCNC: 71 MG/DL
LYMPHOCYTES # BLD AUTO: 0.9 10E9/L (ref 0.8–5.3)
LYMPHOCYTES # BLD AUTO: 1.1 10E9/L (ref 0.8–5.3)
LYMPHOCYTES NFR BLD AUTO: 24.8 %
LYMPHOCYTES NFR BLD AUTO: 29 %
MCH RBC QN AUTO: 26.1 PG (ref 26.5–33)
MCH RBC QN AUTO: 26.7 PG (ref 26.5–33)
MCHC RBC AUTO-ENTMCNC: 30.8 G/DL (ref 31.5–36.5)
MCHC RBC AUTO-ENTMCNC: 33.3 G/DL (ref 31.5–36.5)
MCV RBC AUTO: 80 FL (ref 78–100)
MCV RBC AUTO: 85 FL (ref 78–100)
MONOCYTES # BLD AUTO: 0.4 10E9/L (ref 0–1.3)
MONOCYTES # BLD AUTO: 0.5 10E9/L (ref 0–1.3)
MONOCYTES NFR BLD AUTO: 10.5 %
MONOCYTES NFR BLD AUTO: 13 %
NEUTROPHILS # BLD AUTO: 1.9 10E9/L (ref 1.6–8.3)
NEUTROPHILS # BLD AUTO: 2 10E9/L (ref 1.6–8.3)
NEUTROPHILS NFR BLD AUTO: 52.3 %
NEUTROPHILS NFR BLD AUTO: 53.5 %
NONHDLC SERPL-MCNC: 104 MG/DL
NRBC # BLD AUTO: 0 10*3/UL
NRBC BLD AUTO-RTO: 0 /100
PLATELET # BLD AUTO: 279 10E9/L (ref 150–450)
PLATELET # BLD AUTO: 284 10E9/L (ref 150–450)
POTASSIUM SERPL-SCNC: 4.1 MMOL/L (ref 3.4–5.3)
PROT SERPL-MCNC: 6.6 G/DL (ref 6.8–8.8)
PSA SERPL-ACNC: 0.13 UG/L (ref 0–4)
RBC # BLD AUTO: 3.56 10E12/L (ref 4.4–5.9)
RBC # BLD AUTO: 3.87 10E12/L (ref 4.4–5.9)
SODIUM SERPL-SCNC: 136 MMOL/L (ref 133–144)
SPECIMEN SOURCE: ABNORMAL
T4 FREE SERPL-MCNC: 1.33 NG/DL (ref 0.76–1.46)
TACROLIMUS BLD-MCNC: 15.4 UG/L (ref 5–15)
TME LAST DOSE: ABNORMAL H
TRIGL SERPL-MCNC: 166 MG/DL
TSH SERPL DL<=0.005 MIU/L-ACNC: 0.05 MU/L (ref 0.4–4)
WBC # BLD AUTO: 3.5 10E9/L (ref 4–11)
WBC # BLD AUTO: 3.7 10E9/L (ref 4–11)

## 2019-09-17 PROCEDURE — 36415 COLL VENOUS BLD VENIPUNCTURE: CPT | Performed by: INTERNAL MEDICINE

## 2019-09-17 PROCEDURE — 80053 COMPREHEN METABOLIC PANEL: CPT | Performed by: INTERNAL MEDICINE

## 2019-09-17 PROCEDURE — G0103 PSA SCREENING: HCPCS

## 2019-09-17 PROCEDURE — 80197 ASSAY OF TACROLIMUS: CPT | Performed by: INTERNAL MEDICINE

## 2019-09-17 PROCEDURE — 85025 COMPLETE CBC W/AUTO DIFF WBC: CPT | Performed by: INTERNAL MEDICINE

## 2019-09-17 PROCEDURE — 84443 ASSAY THYROID STIM HORMONE: CPT

## 2019-09-17 PROCEDURE — 80061 LIPID PANEL: CPT

## 2019-09-17 PROCEDURE — 85025 COMPLETE CBC W/AUTO DIFF WBC: CPT

## 2019-09-17 PROCEDURE — 87799 DETECT AGENT NOS DNA QUANT: CPT | Performed by: INTERNAL MEDICINE

## 2019-09-17 PROCEDURE — G0463 HOSPITAL OUTPT CLINIC VISIT: HCPCS | Mod: ZF

## 2019-09-17 RX ORDER — ONDANSETRON 4 MG/1
TABLET, ORALLY DISINTEGRATING ORAL
Refills: 3 | COMMUNITY
Start: 2018-11-07 | End: 2022-11-23

## 2019-09-17 RX ORDER — MESALAMINE 800 MG/1
800 TABLET, DELAYED RELEASE ORAL 3 TIMES DAILY
Qty: 270 TABLET | Refills: 1 | Status: ON HOLD | OUTPATIENT
Start: 2019-09-17 | End: 2021-03-17

## 2019-09-17 ASSESSMENT — MIFFLIN-ST. JEOR: SCORE: 1366.49

## 2019-09-17 ASSESSMENT — PAIN SCALES - GENERAL
PAINLEVEL: MODERATE PAIN (5)
PAINLEVEL: SEVERE PAIN (6)

## 2019-09-17 NOTE — NURSING NOTE
"Chief Complaint   Patient presents with     Gastrointestinal Problem     IBD       Vitals:    09/17/19 1353   BP: 112/75   BP Location: Left arm   Patient Position: Sitting   Cuff Size: Adult Regular   Pulse: 99   Resp: 20   Temp: 98.5  F (36.9  C)   TempSrc: Oral   SpO2: 100%   Weight: 56.3 kg (124 lb 3.2 oz)   Height: 1.725 m (5' 7.91\")       Body mass index is 18.93 kg/m .      Keiko Rosas LPN                          "

## 2019-09-17 NOTE — LETTER
9/17/2019      RE: Everton Larios  [address] [facility] [id number]       [facility]    Transplant Infectious Diseases Outpatient Progress note      Patient:  Everton Larios, Date of birth 1965, Medical record number [id number]  Date of Visit:  09/17/2019            Recommendations:   1. Agree with infectious workup for diarrhea.   2. Agree with CMV PCR.   3. Will also check EBV PCR.  4. Patient declined shingrix vaccine.      RTC: depends on CMV, EBV, and stool studies.         Summary of Presentation:   Transplants:  2/24/2019 (Heart), Postoperative day:  205     This patient is a 54 year old male with congenital heart disease s/p OHT in 2/2019 with methylprednisolone induction and TAC/MMF/prednisone for maintenance.   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         Active Problems and Infectious Diseases Issues:   1. Diarrhea.   2. 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   In 7/2019 CMV was 3.5 log. It was repeated 9/17/2019 and resulted at 3.9 log.   Though the diarrhea might be due to underlying UC, I am wondering if the diarrhea and abdominal pain are due to CMV colitis.   The CMV replication rate is not high probably due to CMV +/+ serostatus.   The CMV PCR resulted after the patient left the clinic. I will contact him to start VGCV PO.      No blurry vision.     2. P aeruginosa BSI 4/29/2019 with P aeruginosa HCAP.   3. Abnormal CT chest with pulmonary nodules that are improving.   The P aeruginosa BSI and HCAP occurred in the setting of febrile neutropenia.   We treated him for severe pseudomonal pneumonia with 4 week course of ABx. On 5/28/2019 he concluded 4 weeks of cefepime from the first negative blood cx on 4/30/2019. Cefepime was used due to QTc prolongation prohibiting the use of fluoroquinolones.   Repeat CT chest 5/22/2019 showed significant improvement of pulmonary nodules.     Though  the CT scan findings can be explained by P aeruginosa pneumonia, I was concerned about aspergillosis given the pulmonary nodules with GGO, low level of positive BD glucan at 109, and the neutropenia. Hence, I also treated the patient with micafungin starting 5/6/2019 till 6/3/2019 for total of 4 weeks.    Prolonged QTc and high costs prohibited the use of voriconazole/posaconazole and isavuconazole, respectively.      4. Neutropenia.   The neutropenia is likely to be due to drugs.   The most likely culprit drugs are MMF/TAC/VGCV/bactrim.   MMF was decreased, bactrim and VGCV were held. The last dose of bactrim 4/26/2019. CMV PCR is being checked weekly. These measures, in addition to 2 doses of G-CSF on 5/1/2019 and 5/2/2019, resulted in resolution of neutropenia.     MMF was increased again 5/24/2019 given declining EF though no evidence of rejection on biopsy.    Will need to continue to monitor CBC with diff now that the the MMF was increased.   Will need to continue to monitor CMV PCR weekly now that the MMF was increased.         Old Problems and Infectious Diseases Issues:   2. Drug induced neutropenia resolved after stopping bactrim and VGCV.     Other Infectious Disease issues include:  - QTc 502 5/6/2019.   - PCP prophylaxis: off of bactrim since 4/26/2019. Was given pentamidine till 8/2019.   - Serostatus: CMV D+/R+, EBV D+/R+, HSV1+/2-, VZV +               VGCV was discontinued due to neutropenia. CMV PCR was not being checked weekly as recommended by ID in 5/2019.    - Immunization status: good candidate for shingrix. Also repeat 23-valent pneumovax sometime after 9/26/2019.   - Gamma globulin status: 358 5/1/2019.         Attestation:  I interviewed the patient and obtained history from the patient, and by reviewing the patient's chart including outside records, microbiological data, and radiological data. All data are summarized in this notes.  Ric Shukla   Pager: [phone]  09/17/2019            Interim History:   No fever, chills, cough.   For the past 6 weeks he's been complaining of watery diarrhea up to 10 times a day associated with abdominal pain but no N/V. Described 8 LBS weight loss since the diarrhea started.         HPI:       Transplants:  2/24/2019 (Heart); Postoperative day:  205  53 yo male with congenital heart disease s/p OHT in 2/2019 with methylprednisolone induction and TAC/MMF/prednisone for maintenance.     The course was complicated by neutropenia late 4/2019. He was then admitted with fever, neutropenia dyspnea, and cough.   Was found to have multiple pulmonary nodules with GGO on chest CT. Blood cx on 4/29/2019 and BAL on 4/30/2019 grew P aeruginosa.   Treated with cefepime IV.   Due to multiple risk factors for aspergillosis including neutropenia, also empirically treated with micafunin IV starting 5/6/2019.   We could not use azoles and fluoroquinolones due to prolonged QTc at baseline. Costs prohibited the use of isavuconazole.     Neutropenia resolved after the 2 doses of G-CSF on 5/1/2019 and 5/22019. Also after the discontinuation of VGCV and bactrim and decreasing the dose of MMF.     On 5/24/2019, TTE showed decrease EF. MMF dose was increased though right heart Bx did not show rejection.   Presented with few days of dyspnea, cough, and subjective fever.   Found to have pseudomonal BSI and pneumonia treated with 4 weeks of cefepime. Also treated with micafungin for 4 weeks due to low level positive BD glucan, pulmonary nodules with GGO which may have been due to P aeruginosa infection, also due to neutropenia. cipro and voriconazole were avoided due to prolonged QTc.              Past Medical History:     Past Medical History:   Diagnosis Date     Alcohol abuse      Pierce's esophagus 10/4/2018     Chronic rhinitis 10/4/2018     Chronic systolic heart failure (H) 10/4/2018     Depression 10/4/2018     Diastolic dysfunction      DJD (degenerative joint disease) - neck  10/4/2018     H/O congenital atrial septal defect (ASD) repair at age 3 10/4/2018     Hypothyroidism due to medication 10/4/2018     ICD, [employer] 2008; gen change 2/2018 10/4/2018     Intermittent asthma without complication 10/4/2018     Nonischemic cardiomyopathy (H) 10/4/2018     On amiodarone therapy 10/4/2018     Paroxysmal atrial fibrillation (H) 10/4/2018     S/P ablation of atrial fibrillation 10/4/2018     Systolic heart failure (H)      Ulcerative colitis (H) 10/4/2018     Ventricular tachycardia (H) 10/4/2018             Past Surgical History:     Past Surgical History:   Procedure Laterality Date     AICD, DUAL CHAMBER       BRONCHOSCOPY (RIGID OR FLEXIBLE), DIAGNOSTIC N/A 4/30/2019    Procedure: BRONCHOSCOPY, WITH BAL;  Surgeon: Margot Chiang MD;  Location:  GI     CV HEART BIOPSY N/A 3/4/2019    Procedure: Heart Biopsy;  Surgeon: Abhijeet Titus MD;  Location:  HEART CARDIAC CATH LAB     CV HEART BIOPSY N/A 3/25/2019    Procedure: Heart Biopsy;  Surgeon: Yves Rodrigues MD;  Location:  HEART CARDIAC CATH LAB     CV HEART BIOPSY N/A 3/28/2019    Procedure: Heart Cath Heart Biopsy;  Surgeon: Yves Rodrigues MD;  Location:  HEART CARDIAC CATH LAB     CV HEART BIOPSY N/A 4/10/2019    Procedure: CV HEART BIOPSY;  Surgeon: Isiah Mcallister MD;  Location: [facility] HEART CARDIAC CATH LAB     CV HEART BIOPSY N/A 4/24/2019    Procedure: CV HEART BIOPSY;  Surgeon: Isiah Mcallister MD;  Location:  HEART CARDIAC CATH LAB     CV HEART BIOPSY N/A 5/8/2019    Procedure: CV HEART BIOPSY;  Surgeon: Isiah Mcallister MD;  Location: [facility] HEART CARDIAC CATH LAB     CV HEART BIOPSY N/A 6/4/2019    Procedure: CV HEART BIOPSY;  Surgeon: Alton Solomon MD;  Location:  HEART CARDIAC CATH LAB     CV HEART BIOPSY N/A 5/22/2019    Procedure: CV HEART BIOPSY;  Surgeon: Yves Rodrigues MD;  Location:  HEART CARDIAC CATH LAB     CV HEART BIOPSY N/A 7/17/2019    Procedure: CV  HEART BIOPSY;  Surgeon: Isiah Mcallister MD;  Location: [facility] CARDIAC CATH LAB     CV HEART BIOPSY N/A 8/13/2019    Procedure: CV HEART BIOPSY;  Surgeon: Jackson Patten MD;  Location:  HEART CARDIAC CATH LAB     CV INTRA-AORTIC BALLOON PUMP INSERTION N/A 2/18/2019    Procedure: Intra-Aortic Balloon;  Surgeon: Alton Solomon MD;  Location:  HEART CARDIAC CATH LAB     CV INTRA-AORTIC BALLOON PUMP INSERTION N/A 2/20/2019    Procedure: Replace subclavian IABP;  Surgeon: Yves Rodrigues MD;  Location: [facility] HE[facility] CARDIAC CATH LAB     CV LEFT HEART CATH N/A 3/19/2019    Procedure: Left Heart Cath;  Surgeon: Yves Rodrigues MD;  Location:  HEART CARDIAC CATH LAB     CV RIGHT HEART CATH N/A 3/19/2019    Procedure: RHC/HBx - Femoral access for 3/19 per Nimisha GONZALEZ;  Surgeon: Yves Rodrigues MD;  Location: [facility] HE[facility] CARDIAC CATH LAB     CV RIGHT HEART CATH N/A 3/25/2019    Procedure: Right Heart Cath;  Surgeon: Yves Rodrigues MD;  Location: [facility] HE[facility] CARDIAC CATH LAB     CV RIGHT HEART CATH N/A 3/28/2019    Procedure: Heart Cath Right Heart Cath;  Surgeon: Yves Rodrigues MD;  Location:  HEART CARDIAC CATH LAB     CV RIGHT HEART CATH N/A 4/24/2019    Procedure: CV RIGHT HEART CATH;  Surgeon: Isiah Mcallister MD;  Location: [facility] HE[facility] CARDIAC CATH LAB     CV RIGHT HEART CATH N/A 3/11/2019    Procedure: ADD ON RHC/HBX;  Surgeon: Alton Solomon MD;  Location: [facility] HE[facility] CARDIAC CATH LAB     CV RIGHT HEART CATH N/A 6/4/2019    Procedure: CV RIGHT HEART CATH;  Surgeon: Alton Solomon MD;  Location:  HEART CARDIAC CATH LAB     CV RIGHT HEART CATH N/A 5/22/2019    Procedure: CV RIGHT HEART CATH;  Surgeon: Yves Rodrigues MD;  Location: [facility] HE[facility] CARDIAC CATH LAB     CV RIGHT HEART CATH N/A 8/13/2019    Procedure: CV RIGHT HEART CATH;  Surgeon: Jackson Patten MD;  [facility]: [facility]     INCISION AND DRAINAGE CHEST WASHOUT, COMBINED  N/A 3/21/2019    Procedure: Incision And Drainage; Evacuation Right Chest Wall Hematoma;  Surgeon: Dewayne House MD;  [facility]: [facility]     INSERT INTRAAORTIC BALLOON PUMP Left 2019    Procedure: Insert left  Subclavian Balloon Pump,  Removal Right femoral arterial balloom pump sheath;  Surgeon: Dewayne House MD;  [facility]     INSERT INTRAAORTIC BALLOON PUMP Left 2019    Procedure: SUBCLAVIAN BALLOON PUMP PLACEMENT;  Surgeon: Ben White MD;  [facility]     IR PICC PLACEMENT > 5 YRS OF AGE  2019     TRANSPLANT HEART RECIPIENT N/A 2019    Procedure: TRANSPLANT HEART RECIPIENT;  Surgeon: Dewayne House MD;  [facility]               Social History:     Social History     Tobacco Use     Smoking status: Former Smoker     Years: 10.00     Start date: 1980     Last attempt to quit: 2008     Years since quittin.7     Smokeless tobacco: Never Used   Substance Use Topics     Alcohol use: Not Currently     Alcohol/week: 0.6 oz     Types: 1 Cans of beer per week     Comment: a couple times a week              Family History:     Family History   Problem Relation Age of Onset     Endometrial Cancer Mother      Hypertension Father      Endometrial Cancer Maternal Grandmother              Immunizations:     Immunization History   Administered Date(s) Administered     Influenza (IIV3) PF 10/22/2008, 2009, 2011, 2012, 10/27/2013, 10/02/2014     Influenza Vaccine IM > 6 months Valent IIV4 10/02/2015     Influenza Vaccine, 3 YRS +, IM (QUADRIVALENT W/PRESERVATIVES) 2016, 10/16/2017, 2018     OPV, trivalent, live 10/20/1978     Pneumo Conj 13-V (2010&after) 2018     TDAP Vaccine (Boostrix) 2012     Td (Adult), Adsorbed 10/20/1978             Allergies:     Allergies   Allergen Reactions     Hydromorphone Other (See Comments)     Significant Delirium     Adhesive Tape Blisters     Tegaderm causes bruises, blisters and  extreme irritation.     Lisinopril Other (See Comments)     hypotension     Quinolones Other (See Comments)     Would not rx quinolones until QTc interval improved.      Tobramycin Other (See Comments)     Would not use aminoglycosides as his kidney function is very borderline     Codeine Nausea             Medications:     Current Outpatient Medications   Medication Sig     acetaminophen (TYLENOL) 325 MG tablet Take 2 tablets (650 mg) by mouth every 4 hours as needed for mild pain (Patient not taking: Reported on 9/17/2019)     albuterol (PROAIR HFA/PROVENTIL HFA/VENTOLIN HFA) 108 (90 Base) MCG/ACT inhaler Inhale 2 puffs into the lungs every 6 hours as needed for shortness of breath / dyspnea or wheezing     amLODIPine (NORVASC) 5 MG tablet TAKE 1 TABLET BY MOUTH EVERY DAY     aspirin (ASA) 81 MG chewable tablet Take 1 tablet (81 mg) by mouth daily     balsalazide (COLAZAL) 750 MG capsule TAKE 3 CAPSULES BY MOUTH TWICE DAILY     calcium carbonate 600 mg-vitamin D 400 units (CALTRATE) 600-400 MG-UNIT per tablet Take 1 tablet by mouth 2 times daily (with meals)     cetirizine (ZYRTEC) 10 MG tablet Take 10 mg by mouth daily     DULoxetine (CYMBALTA) 20 MG EC capsule Take 20 mg by mouth daily     fluticasone (FLONASE) 50 MCG/ACT nasal spray Spray 1 spray into both nostrils 2 times daily as needed      fluticasone (FLOVENT HFA) 220 MCG/ACT Inhaler Inhale 2 puffs into the lungs 2 times daily     KLOR-CON 20 MEQ CR tablet TAKE 1 TABLET (20 MEQ) BY MOUTH DAILY     levothyroxine (SYNTHROID/LEVOTHROID) 100 MCG tablet Take 100 mcg by mouth daily      magnesium oxide (MAG-OX) 400 MG tablet Take 1 tablet (400 mg) by mouth 2 times daily     melatonin 3 MG tablet Take 2 tablets (6 mg) by mouth At Bedtime (Patient taking differently: Take 5 mg by mouth At Bedtime )     montelukast (SINGULAIR) 10 MG tablet Take 1 tablet (10 mg) by mouth At Bedtime     multivitamin w/minerals (THERA-VIT-M) tablet Take 1 tablet by mouth daily      mycophenolate (GENERIC EQUIVALENT) 250 MG capsule Take 6 capsules (1,500 mg) by mouth 2 times daily     omeprazole (PRILOSEC) 40 MG DR capsule Take 1 capsule (40 mg) by mouth daily     ondansetron (ZOFRAN-ODT) 4 MG ODT tab DISSOLVE 1 TABLET ON THE TONGUE EVERY EIGHT HOURS AS NEEDED     polyethylene glycol (MIRALAX/GLYCOLAX) powder Take 1 capful by mouth daily     rosuvastatin (CRESTOR) 10 MG tablet Take 1 tablet (10 mg) by mouth daily     senna (SENOKOT) 8.6 MG tablet Take 2 tablets by mouth 2 times daily as needed for constipation     tacrolimus (GENERIC EQUIVALENT) 0.5 MG capsule Pt currently not taking.     tacrolimus (GENERIC EQUIVALENT) 1 MG capsule Take 4 capsules (4 mg) by mouth 2 times daily     No current facility-administered medications for this visit.             Review of Systems:   As mentioned in the interim history otherwise negative by reviewing constitutional symptoms, central and peripheral neurological systems, respiratory system, cardiac system, GI system,  system, musculoskeletal, skin, allergy, and lymphatics.                  Physical Exam:     Vitals:    09/17/19 1540   BP: 112/75   Pulse: 99   Temp: 98.5  F (36.9  C)   TempSrc: Oral   SpO2: 100%   Weight: 56.2 kg (123 lb 14.4 oz)     Wt Readings from Last 4 Encounters:   09/17/19 56.2 kg (123 lb 14.4 oz)   09/17/19 56.3 kg (124 lb 3.2 oz)   08/13/19 60.1 kg (132 lb 9.6 oz)   07/17/19 60.1 kg (132 lb 9.6 oz)       Constitutional: awake, alert, cooperative, no apparent distress and appears at stated age, well nourished.   ENNT: PERRL, EOMI, pink conjunctivae, non-icteric sclera.   Neck supple without rigidity, no cervical/axillary/inguinal LA bilaterally.   Neurologic: Patient is moving all extremities without focal deficit, no focal sensory loss.   Lungs: CTA bilaterally, no accessory muscle use, no dullness to percussion and no abnormal tactile fremitus.   CVS: RRR, normal S1/S2, no murmur, PMI was not displaced.   Abdomen: non-tender,  non-distended, no masses, no bruit, no shifting dullness, normal BS.   Extremities: no pitting edema of bilateral lower extremities, no ulcers, normal ROM of all joints, no swelling or erythema of any of joints and no tenderness to palpation.   Skin: no induration, fluctuation or discharge, and no rash.             Laboratory Data:     No results found for: ACD4    Inflammatory Markers    Recent Labs   Lab Test 05/15/19  0940 05/02/19  0536 04/29/19  0911 11/15/18  0955 10/22/18  1239   SED  --  37*  --   --   --    CRP <2.9 69.0* 120.0* 6.1 12.5       Immune Globulin Studies      Recent Labs   Lab Test 05/01/19  1850   *   IGM 29*   IGE 38          Metabolic Studies    Recent Labs   Lab Test 09/17/19  0856 08/13/19  1200 07/24/19  1013 07/17/19  1030 06/26/19  0936 06/17/19  0926  05/22/19  0926  04/29/19  0918 04/29/19  0911  02/23/19  1835    138 132* 135 139 137   < > 138   < >  --  131*   < > 133   POTASSIUM 4.1 4.6 4.3 4.2 4.0 4.1   < > 3.7   < >  --  3.5   < > 3.6   CHLORIDE 110* 106 103 103 105 104   < > 102   < >  --  94   < > 93*   CO2 PENDING 22 22 24 23 20   < > 25   < >  --  24   < > 30   ANIONGAP PENDING 10 7 8 11 13   < > 10   < >  --  12   < > 10   BUN PENDING 32* 18 17 26 27   < > 53*   < >  --  23   < > 28   CR PENDING 1.26* 1.10 1.07 1.50* 1.39*   < > 2.43*   < >  --  1.04   < > 1.03   GFRESTIMATED PENDING 63 74 77 51* 56*   < > 29*   < >  --  80   < > 81   GLC PENDING 80 88 80 80 80   < > 82   < >  --  79   < > 114*   A1C  --   --   --   --   --   --   --   --   --   --   --   --  5.7*   RADAMES PENDING 9.2 8.6 8.9 9.0 8.8   < > 9.3   < >  --  9.0   < > 8.9   PHOS  --   --   --  4.0  --  3.8   < > 4.2   < >  --   --    < > 3.6   MAG  --   --   --  1.7  --  1.4*   < > 1.9   < >  --   --    < > 2.0   LACT  --   --   --   --   --   --   --   --   --  1.7 1.6   < >  --    CKT  --   --   --  34  --  46  --  26*  --   --   --   --   --     < > = values in this interval not displayed.        Hepatic Studies    Recent Labs   Lab Test 09/17/19  0856 07/24/19  1013 06/26/19  0936 06/17/19  0926 05/28/19  1421 05/25/19  0006  03/14/19  1653  03/10/19  0410   BILITOTAL PENDING 0.5 0.5 0.4 0.4 0.3   < >  --    < > 0.8   ALKPHOS PENDING 268* 160* 135 111 91   < >  --    < > 104   PROTTOTAL PENDING 6.4* 7.1 6.6* 6.8 6.5*   < >  --    < > 5.0*   ALBUMIN PENDING 3.5 3.8 3.8 3.2* 3.1*   < >  --    < > 2.8*   AST PENDING 25 21 19 11 16   < >  --    < > 22   ALT PENDING 26 15 17 16 15   < >  --    < > 19   LDH  --   --   --   --   --   --   --  320*  --  276*    < > = values in this interval not displayed.       Hematology Studies     Recent Labs   Lab Test 09/17/19  0856 08/13/19  1200 07/24/19  1013 07/17/19  1030 06/26/19  0936 06/17/19  0926  05/24/19  1655   WBC 3.5* 3.6* 4.9 4.9 5.4 6.4   < > 8.9   ANEU 1.9  --  2.6 2.7 2.7 4.0  --  7.6   ALYM 0.9  --  1.1 1.0 1.2 1.1  --  0.8   GRACE 0.5  --  0.7 0.6 1.1 1.1  --  0.2   AEOS 0.3  --  0.3 0.4 0.3 0.1  --  0.1   HGB 9.5* 9.2* 9.3* 9.3* 10.5* 9.6*   < > 9.0*   HCT 28.5* 30.3* 29.9* 31.0* 32.9* 29.6*   < > 28.1*    273 251 244 262 289   < > 191    < > = values in this interval not displayed.       Clotting Studies    Recent Labs   Lab Test 05/08/19  0504 05/07/19  0451 05/06/19  0438 05/05/19  0720  03/14/19  1653  02/27/19  0421 02/25/19  1502 02/24/19  1817   INR 1.22* 1.33* 1.31* 1.33*   < > 1.36*   < >  --  1.29* 1.50*   PTT  --   --   --   --   --  46*  --  38* 32 55*    < > = values in this interval not displayed.       Urine Studies    Recent Labs   Lab Test 05/26/19  1006 04/29/19  1031 03/26/19  1707 03/17/19  0045 03/04/19  1303   URINEPH 6.5 6.0 5.5 5.5 5.0   NITRITE Negative Negative Negative Negative Negative   LEUKEST Negative Negative Negative Negative Negative   WBCU 0 <1 2 15* 0         Microbiology:  Last 6 Culture results with specimen source  Culture Micro   Date Value Ref Range Status   05/02/2019 No growth  Final   05/02/2019 No  growth  Final   05/02/2019 (A)  Final    Canceled, Test credited  >10 Squamous epithelial cells/low power field indicates oral contamination. Please   recollect.     05/01/2019 No growth  Final   05/01/2019 No growth  Final   04/30/2019 (A)  Final    Canceled, Test credited  >10 Squamous epithelial cells/low power field indicates oral contamination. Please   recollect.     04/30/2019   Final    Notification of test cancellation was given to  ASHLIE SCOTT, RN [id number] 4.30.19 [facility]      Specimen Description   Date Value Ref Range Status   05/02/2019 Blood Right Hand  Final   05/02/2019 Blood Right Hand  Final   05/02/2019 Sputum  Final   05/02/2019 Sputum  Final   05/01/2019 Blood Left Arm  Final   05/01/2019 Blood Right Hand  Final        Last check of C difficile  C Diff Toxin B PCR   Date Value Ref Range Status   04/29/2019 Negative NEG^Negative Final     Comment:     Negative: Clostridium difficile target DNA sequences NOT detected, presumed   negative for Clostridium difficile toxin B or the number of bacteria present   may be below the limit of detection for the test.  FDA approved assay performed using [employer] GeneXpert real-time PCR.  A negative result does not exclude actual disease due to Clostridium difficile   and may be due to improper collection, handling and storage of the specimen   or the number of organisms in the specimen is below the detection limit of the   assay.           Virology:  CMV viral loads    CMV viral loads    Recent Labs   Lab Test 07/24/19  1013 06/26/19  0935   CSPEC EDTA PLASMA EDTA PLASMA   CMVLOG 3.5* <2.1       CMV viral loads    Log IU/mL of CMVQNT   Date Value Ref Range Status   07/24/2019 3.5 (H) <2.1 [Log_IU]/mL Final   06/26/2019 <2.1 <2.1 [Log_IU]/mL Final   06/04/2019 Not Calculated <2.1 [Log_IU]/mL Final   05/24/2019 Not Calculated <2.1 [Log_IU]/mL Final   05/22/2019 Not Calculated <2.1 [Log_IU]/mL Final   05/15/2019 Not Calculated <2.1 [Log_IU]/mL Final   05/06/2019 Not  Calculated <2.1 [Log_IU]/mL Final   04/30/2019 Not Calculated <2.1 [Log_IU]/mL Final   04/29/2019 Not Calculated <2.1 [Log_IU]/mL Final   04/26/2019 Not Calculated <2.1 [Log_IU]/mL Final   04/24/2019 Not Calculated <2.1 [Log_IU]/mL Final   04/17/2019 Not Calculated <2.1 [Log_IU]/mL Final   04/08/2019 Not Calculated <2.1 [Log_IU]/mL Final   04/01/2019 Not Calculated <2.1 [Log_IU]/mL Final   03/25/2019 Not Calculated <2.1 [Log_IU]/mL Final   03/18/2019 Not Calculated <2.1 [Log_IU]/mL Final       CMV resistance testing  No lab results found.  No results found for: CMVCID, CMVFOS, CMVGAN     EBV viral loads     Recent Labs   Lab Test 05/22/19  0926 04/29/19  0911 04/26/19  0848 03/27/19  0521   EBRES 541* EBV DNA Not Detected EBV DNA Not Detected EBV DNA Not Detected     EBV DNA Copies/mL   Date Value Ref Range Status   05/22/2019 541 (A) EBVNEG^EBV DNA Not Detected [Copies]/mL Final   04/29/2019 EBV DNA Not Detected EBVNEG^EBV DNA Not Detected [Copies]/mL Final   04/26/2019 EBV DNA Not Detected EBVNEG^EBV DNA Not Detected [Copies]/mL Final   03/27/2019 EBV DNA Not Detected EBVNEG^EBV DNA Not Detected [Copies]/mL Final       Human Herpes Virus 6 viral loads    No results found for: H6RES No results found for: H6SPEC    CMV Antibody IgG   Date Value Ref Range Status   02/23/2019 >8.0 (H) 0.0 - 0.8 AI Final     Comment:     Positive  Antibody index (AI) values reflect qualitative changes in antibody   concentration that cannot be directly associated with clinical condition or   disease state.     02/15/2019 >8.0 (H) 0.0 - 0.8 AI Final     Comment:     Positive  Antibody index (AI) values reflect qualitative changes in antibody   concentration that cannot be directly associated with clinical condition or   disease state.       Toxoplasma Antibody IgG   Date Value Ref Range Status   02/22/2019 <3.0 0.0 - 7.1 IU/mL Final     Comment:     Negative- Absence of detectable Toxoplasma gondii IgG antibodies. A negative   result  does not rule out acute infection.  The magnitude of the measured result is not indicative of the amount of   antibody present. The concentrations of anti-Toxoplasma gondii IgG in a given   specimen determined with assays from different manufacturers can vary due to   differences in assay methods and reagent specificity.         Pathology:  Heart bx 5/22/2019  FINAL DIAGNOSIS:   Heart, allograft, right ventricle, endomyocardial biopsy:   - Acute cellular rejection: Focal mild rejection, Grade 1R/1A (ISHLT 2004)        - Antibody-mediated rejection: No evidence of antibody-mediated   rejection        - C3d and C4d are negative (see comment)     BAL 4/30/2019   Lung, lingula, bronchoalveolar lavage:    -Negative for malignancy    -No fungal or Pneumocystis organisms are identified on GMS stained   preparations.    -Viral cytopathic effect is absent.   Specimen Adequacy: Satisfactory for evaluation.       Imaging:  CT chest 5/22/2019 reviewed by myself.   IMPRESSION:   1. Significantly decreased bilateral nodular and consolidative  opacities from 4/29/2019. No new or worsening pulmonary opacities.  These findings are likely representative of a improving infection.  2. Stable subacute T12 vertebral body compression deformity.    Results for orders placed or performed during the hospital encounter of 05/24/19   US Renal Complete    Narrative    EXAMINATION: US RENAL COMPLETE, 5/25/2019 12:30 PM     COMPARISON: 3/26/2018    HISTORY: [facility]-patient post heart transplant     FINDINGS:    Right kidney: Measures 12.3 cm in length. Parenchyma is of normal  thickness. Mildly increased cortical echogenicity. No focal mass. No  hydronephrosis.    Left kidney: Measures 10.4 cm in length. Parenchyma is of normal  thickness. Mildly increased cortical echogenicity. No focal mass. No  hydronephrosis.     Bladder: Unremarkable.      Impression    IMPRESSION:  Mildly increased cortical echogenicity bilaterally, suggestive of  medical renal  disease. No hydronephrosis.    I have personally reviewed the examination and initial interpretation  and I agree with the findings.    RUFUS CORDERO MD       Cardiology   TTE 5/25/2019  Interpretation Summary  Moderate concentric wall thickening consistent with left ventricular  hypertrophy is present.  Mildly (EF 40-45%) reduced left ventricular function is present.  Mild right ventricular dilation and right ventricular function is mildly  reduced.  Moderate mitral insufficiency.  Mild to moderate tricuspid insufficiency.  Moderate pulmonary hypertension with dilated IVC.     This study was compared with the study from 5/22/19 and MR is more prominent.    Ric Shukla MD

## 2019-09-17 NOTE — PROGRESS NOTES
IBD CLINIC VISIT    CC/REFERRING MD:  Elin Jensen  REASON FOR CONSULTATION: Establish care for UC Pancolitis    IBD HISTORY  IBD type: UC pancolitis (patient report)  Age at diagnosis: (diagnosed >10 years ago)  Endoscopic extent of disease: Quiescent disease on last colonoscopy ()  Histologic: Quiescent on last colonoscopy (2017)  Current IBD medications: Balsalazide  Prior IBD surgeries: None  Prior IBD Medications:  - Steroids (many years ago)  - Asacol (stopped because insurance did not cover)    DRUG MONITORING  TPMT enzyme activity:  N/A    6-TGN/6-MMPN levels: N/A    Biologic concentration: N/A    DISEASE ASSESSMENT  Labs  Recent Labs   Lab Test 19  1702 05/15/19  0940 19  0536   CRP <2.9 <2.9 69.0*   SED 18  --  37*     Fecal calprotectin: To be collected  Endoscopy: Quiescent , colonoscopy to be scheduled  Enterography: No recent  C diff: To be collected    Yadav score:   Stool freq: 3 (>4 stools/day more than normal)  Rectal bleedin (None)  PGA: 2 (Moderate)  Endoscopy: Not done    Remission: <3   Mild disease: 3-5  Moderate disease: 6-10  Severe disease: >10    ASSESSMENT/PLAN:  Everton Larios is a 54 year old male with a history of ASD (s/p repair in childhood), NICM 2/p OHT (19) on tacrolimus & MMF for IST, ulcerative pancolitis (diagnosed  on balsalazide), Luisito's Esophagus who was referred to our clinic to establish care for his ulcerative colitis.    #. 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 Plan to evaluate with stool studies and colonoscopy to include biopsies. He is also due for surveillance colonoscopy (last  with quiescent UC).    #. Ulcerative pancolitis:  Previoulsy quiescent since 2017 on Balsalazide monotherapy. Patient was  following with [facility]. We do not have any clinic notes from these vitis - patient will need to sign PHAM to have these records faxed over. He inquires about changing to Asacol as his insurance will now cover this, OK for now pending colonoscopy results.    #. Elevated alkaline phosphatase:  Rising since 6/17/19, now 285. Will evaluate for PSC with MRCP given history of UC. Total bilirubin, AST/ALT remain normal.    #. Acute kidney injury with NGMA:  Cr rising since 8/2019, now 2.02 (baseline ~1.1). Suspect related to known diarrhea given concurrent NGMA. Will encourage oral rehydration and follow creatinine - will alert primary cardiology teams given IST and primary care provider as well.    #. 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 Next due 2020.    #. Osteoporosis:  Follows with PCP. Last DEXA 2018. On calcium and vitamin D, declined further use of Prolia per PCP notes.    RECOMMENDATIONS:  -- Stool Studies: Enteric panel, cryptosporidium, Giardia, Ova & Parasite, Fecal calprotectin  -- Labs: CBC, CMP (per ID), GGT added on to liver panel  -- Colonoscopy with MAC to be scheduled, include random biopsies for dysplasia, microscopic colitis, MMF and targeted biopsies if any ulcerations concerning for CMV  -- Schedule MRCP  -- OK to switch to Asacol from balsalazide  -- Next EGD for Pierce's surveillance due 2020  -- Patient to sign PHAM for [facility] records (clinic notes and endoscopic procedures prior to 2017)  -- Continue Calcium and Vitamin D supplementation  -- Maintain adequate hydration in setting of diarrhea  -- Continue to avoid all NSAIDs  -- RTC in 6 weeks after colonoscopy    IBD Health Care Maintenance:    Vaccinations:  All patients on biologics should avoid live vaccines.    -- Influenza (every year)  -- TdaP (every 10 years)  -- Pneumococcal Pneumonia (once plus  booster at 5 years)  -- Yearly assessment for latent Tb (verbal screening and exam, PPD or QuantiFERON-Tb testing)    One time confirmation of immunity or serologies:  -- Hepatitis A (serologies or immunizations)  -- Hepatitis B (serologies or immunizations)  -- Varicella  -- MMR  -- HPV (all aged 18-26)  -- Meningococcal meningitis (all patients at risk for meningitis)  -- Due to the immunosuppression in this patient, I would not advise administration of live vaccines such as varicella/VZV, intranasal influenza, MMR, or yellow fever vaccine (if travelling).      Bone mineral density screening   -- Recommend all patients supplement with calcium and vitamin D    Cancer Screening:  Colon cancer screening:   Given >10 years of pancolonic disease, recommend patient undergo regular dysplasia surveillance   Next dysplasia screening is recommended 2019 (to be scheduled).    Skin cancer screening: Annual visual exam of skin by dermatologist since patient is immunocompromised    Misc:  -- Avoid tobacco use  -- Avoid NSAIDs as there is potentially a 25% chance of causing an IBD flare    Return to clinic in 6 weeks    Pt care plan discussed with Dr. Allen, GI staff physician.    Jocelynn Jensen MD  GI Fellow  [phone]    HPI:   Everton Larios is a 54 year old male with a history of AST (s/p repair in childhood), NICM 2/p OHT (2/24/19) on tacrolimus & MMF for IST, ulcerative pancolitis (diagnosed 2005 on balsalazide) who was referred to our clinic to establish care for his ulcerative colitis.    He reports that he has been on balsalazide for years for his UC until July 20th when he stopped the Balsalazide and was started on linzess for constipation. With the initiation of linzess he started to have diarrhea so he stopped this medication (think she was taking the 140mcg dose). He resumed his Balsalazide about 4 weeks ago.     Currently, he describes having low left to mid abdominal pain that is constant 5-8/10 in  severity. The pain does keep him up at night, feels like it is worse at night. He is having >10 bowel movements daily that he describes as type 6-7 on the bristol stool scale. He is not having any blood in his stool. He has lost about 7-8 lbs since August that is unintentional. He describes that most of the foods he eats don't taste good or give him a lot of gas (1-2 hours later) and abdominal discomfort. He states he is only really able to keep down peanutbutter, chocolate chip cookies or breakfast cereal.    He also reports a lot of fatigue, and occasional nausea when he takes his morning medications if he does not spread them out.     ROS:    No fevers or chills  Weight loss  No blurry vision, double vision or change in vision  No sore throat  No lymphadenopathy  No headache, paraesthesias, or weakness in a limb  No shortness of breath or wheezing  No chest pain or pressure  No arthralgias or myalgias  No rashes or skin changes  No odynophagia or dysphagia  No BRBPR, hematochezia, melena  No dysuria, frequency or urgency  No hot/cold intolerance or polyria  No anxiety or depression    Extra intestinal manifestations of IBD:  No uveitis/episcleritis  No aphthous ulcers   No arthritis   No erythema nodosum/pyoderma gangrenosum.     PERTINENT PAST MEDICAL HISTORY:  Past Medical History:   Diagnosis Date     Alcohol abuse      Pierce's esophagus 10/4/2018     Chronic rhinitis 10/4/2018     Chronic systolic heart failure (H) 10/4/2018     Depression 10/4/2018     Diastolic dysfunction      DJD (degenerative joint disease) - neck 10/4/2018     H/O congenital atrial septal defect (ASD) repair at age 3 10/4/2018     Hypothyroidism due to medication 10/4/2018     ICD, [employer] 2008; gen change 2/2018 10/4/2018     Intermittent asthma without complication 10/4/2018     Nonischemic cardiomyopathy (H) 10/4/2018     On amiodarone therapy 10/4/2018     Paroxysmal atrial fibrillation (H) 10/4/2018     S/P ablation of  atrial fibrillation 10/4/2018     Systolic heart failure (H)      Ulcerative colitis (H) 10/4/2018     Ventricular tachycardia (H) 10/4/2018       PREVIOUS SURGERIES:  Past Surgical History:   Procedure Laterality Date     AICD, DUAL CHAMBER       BRONCHOSCOPY (RIGID OR FLEXIBLE), DIAGNOSTIC N/A 4/30/2019    Procedure: BRONCHOSCOPY, WITH BAL;  Surgeon: Margot Chiang MD;  Location:  GI     CV HEART BIOPSY N/A 3/4/2019    Procedure: Heart Biopsy;  Surgeon: Abhijeet Titus MD;  Location:  HEART CARDIAC CATH LAB     CV HEART BIOPSY N/A 3/25/2019    Procedure: Heart Biopsy;  Surgeon: Yves Rodrigues MD;  Location:  HEART CARDIAC CATH LAB     CV HEART BIOPSY N/A 3/28/2019    Procedure: Heart Cath Heart Biopsy;  Surgeon: Yves Rodrigues MD;  Location:  HEART CARDIAC CATH LAB     CV HEART BIOPSY N/A 4/10/2019    Procedure: CV HEART BIOPSY;  Surgeon: Isiah Mcallister MD;  Location:  HEART CARDIAC CATH LAB     CV HEART BIOPSY N/A 4/24/2019    Procedure: CV HEART BIOPSY;  Surgeon: Isiah Mcallister MD;  Location:  HEART CARDIAC CATH LAB     CV HEART BIOPSY N/A 5/8/2019    Procedure: CV HEART BIOPSY;  Surgeon: Isiah Mcallister MD;  Location:  HEART CARDIAC CATH LAB     CV HEART BIOPSY N/A 6/4/2019    Procedure: CV HEART BIOPSY;  Surgeon: Alton Solomon MD;  Location:  HEART CARDIAC CATH LAB     CV HEART BIOPSY N/A 5/22/2019    Procedure: CV HEART BIOPSY;  Surgeon: Yves Rodrigues MD;  Location:  HEART CARDIAC CATH LAB     CV HEART BIOPSY N/A 7/17/2019    Procedure: CV HEART BIOPSY;  Surgeon: Isiah Mcallister MD;  Location:  HEART CARDIAC CATH LAB     CV HEART BIOPSY N/A 8/13/2019    Procedure: CV HEART BIOPSY;  Surgeon: Jackson Patten MD;  Location:  HEART CARDIAC CATH LAB     CV INTRA-AORTIC BALLOON PUMP INSERTION N/A 2/18/2019    Procedure: Intra-Aortic Balloon;  Surgeon: Alton Solomon MD;  Location:  HEART CARDIAC CATH LAB     CV  INTRA-AORTIC BALLOON PUMP INSERTION N/A 2/20/2019    Procedure: Replace subclavian IABP;  Surgeon: Yves Rodrigues MD;  [facility]     CV LEFT HEART CATH N/A 3/19/2019    Procedure: Left Heart Cath;  Surgeon: Yves Rodrigues MD;  Location:  HEART CARDIAC CATH LAB     CV RIGHT HEART CATH N/A 3/19/2019    Procedure: RHC/HBx - Femoral access for 3/19 per Nimisha GONZALEZ;  Surgeon: Yves Rodrigues MD;  [facility]     CV RIGHT HEART CATH N/A 3/25/2019    Procedure: Right Heart Cath;  Surgeon: Yves Rodrigues MD;  Location:  HEART CARDIAC CATH LAB     CV RIGHT HEART CATH N/A 3/28/2019    Procedure: Heart Cath Right Heart Cath;  Surgeon: Yves Rodrigues MD;  Location:  HEART CARDIAC CATH LAB     CV RIGHT HEART CATH N/A 4/24/2019    Procedure: CV RIGHT HEART CATH;  Surgeon: Isiah Mcallister MD;  Location:  HEART CARDIAC CATH LAB     CV RIGHT HEART CATH N/A 3/11/2019    Procedure: ADD ON RHC/HBX;  Surgeon: Alton Solomon MD;  Location:  HEART CARDIAC CATH LAB     CV RIGHT HEART CATH N/A 6/4/2019    Procedure: CV RIGHT HEART CATH;  Surgeon: Alton Solomon MD;  Location:  HEART CARDIAC CATH LAB     CV RIGHT HEART CATH N/A 5/22/2019    Procedure: CV RIGHT HEART CATH;  Surgeon: Yves Rodrigues MD;  Location:  HEART CARDIAC CATH LAB     CV RIGHT HEART CATH N/A 8/13/2019    Procedure: CV RIGHT HEART CATH;  Surgeon: Jacskon Patten MD;  Location:  HEART CARDIAC CATH LAB     INCISION AND DRAINAGE CHEST WASHOUT, COMBINED N/A 3/21/2019    Procedure: Incision And Drainage; Evacuation Right Chest Wall Hematoma;  Surgeon: Dewayne House MD;  [facility]     INSERT INTRAAORTIC BALLOON PUMP Left 2/19/2019    Procedure: Insert left  Subclavian Balloon Pump,  Removal Right femoral arterial balloom pump sheath;  Surgeon: Dewayne House MD;  [facility]     INSERT INTRAAORTIC BALLOON PUMP Left 2/21/2019     Procedure: SUBCLAVIAN BALLOON PUMP PLACEMENT;  Surgeon: Ben White MD;  [facility]     IR PICC PLACEMENT > 5 YRS OF AGE  5/8/2019     TRANSPLANT HEART RECIPIENT N/A 2/24/2019    Procedure: TRANSPLANT HEART RECIPIENT;  Surgeon: Dewayne Houes MD;  Location: [facility]       PREVIOUS ENDOSCOPY:    Colonoscopy (12/19/17):      EGD (12/19/17):    Path (12/2017):  A) RANDOM BIOPSIES OF RIGHT COLON:       - NO DIAGNOSTIC ABNORMALITY (MILD MELANOSIS COLI PRESENT)    B) BIOPSIES OF TRANSVERSE COLON:       - NO DIAGNOSTIC ABNORMALITY (MILD MELANOSIS COLI PRESENT)    C) BIOPSIES OF LEFT COLON:       - NO DIAGNOSTIC ABNORMALITY       - HYPERTROPHIC LYMPHOID NODULE IN ONE BIOPSY FRAGMENT       - MILD MELANOSIS COLI IN TWO BIOPSY FRAGMENTS    D) BIOPSIES OF RECTOSIGMOID COLON:       - NEGATIVE FOR ACTIVE COLITIS       - NEGATIVE FOR CHRONIC INFLAMMATORY CHANGES       - NO EPITHELIAL ATYPIA       - MILD MELANOSIS COLI PRESENT    E) BIOPSIES OF DESCENDING COLON:       - MILD CHRONIC INFLAMMATORY STROMAL INFILTRATE, WITH FEATURES           OF LYMPHOCYTIC COLITIS       - MILD MELANOSIS COLI PRESENT       - NEGATIVE FOR ACUTE COLITIS       - NEGATIVE FOR EPITHELIAL ATYPIA OR DYSPLASIA    F) BIOPSIES OF SIGMOID COLON:        - HYPERPLASTIC POLYP    G) BIOPSIES OF SIGMOID COLON:       - FOCAL AREA OF LYMPHANGIECTASIA IN BASAL MUCOSA/MUSCULARIS           MUCOSAE       - NO TRUE MUCOSAL POLYP ARCHITECTURE IDENTIFIED    H) BIOPSIES OF RECTAL POLYP:       - BENIGN LEIOMYOMA OF MUSCULARIS MUCOSAE       - NORMAL OVERLYING MUCOSA    I) RECTAL BIOPSY TISSUE:       - CRYPT CHANGES CONSISTENT WITH EARLY HYPERPLASTIC POLYP       - HYPERTROPHIC MUCOSAL LYMPHOID NODULE    J) GASTRIC BIOPSY TISSUE:       - FOCAL AREA OF FUNDIC GLAND DILATATION, SUGGESTIVE OF EARLY           FUNDIC GLAND POLYP       - MINIMAL CHRONIC INFLAMMATORY STROMAL INFILTRATE, DIFFUSE            DISTRIBUTION       - NEGATIVE FOR ACTIVE GASTRITIS       - NEGATIVE  FOR INTESTINAL METAPLASIA OR EPITHELIAL DYSPLASIA       - NEGATIVE FOR HELICOBACTER ORGANISMS BY IMMUNOHISTOCHEMISTRY    K) BIOPSIES OF DISTAL ESOPHAGUS:       - LEA-TYPE INTESTINAL METAPLASIA, EXTENSIVE IN BIOPSY,            CONSISTENT WITH LEA ESOPHAGUS       - MILD CHRONIC INFLAMMATORY STROMAL INFILTRATE (LEA            ESOPHAGITIS)       - NEGATIVE FOR ACUTE ESOPHAGITIS       - NEGATIVE FOR HIGH-GRADE EPITHELIAL DYSPLASIA    ALLERGIES:     Allergies   Allergen Reactions     Hydromorphone Other (See Comments)     Significant Delirium     Adhesive Tape Blisters     Tegaderm causes bruises, blisters and extreme irritation.     Lisinopril Other (See Comments)     hypotension     Quinolones Other (See Comments)     Would not rx quinolones until QTc interval improved.      Tobramycin Other (See Comments)     Would not use aminoglycosides as his kidney function is very borderline     Codeine Nausea       PERTINENT MEDICATIONS:    Current Outpatient Medications:      amLODIPine (NORVASC) 5 MG tablet, TAKE 1 TABLET BY MOUTH EVERY DAY, Disp: 30 tablet, Rfl: 2     aspirin (ASA) 81 MG chewable tablet, Take 1 tablet (81 mg) by mouth daily, Disp: , Rfl:      balsalazide (COLAZAL) 750 MG capsule, TAKE 3 CAPSULES BY MOUTH TWICE DAILY, Disp: , Rfl:      calcium carbonate 600 mg-vitamin D 400 units (CALTRATE) 600-400 MG-UNIT per tablet, Take 1 tablet by mouth 2 times daily (with meals), Disp: , Rfl:      cetirizine (ZYRTEC) 10 MG tablet, Take 10 mg by mouth daily, Disp: , Rfl:      DULoxetine (CYMBALTA) 20 MG EC capsule, Take 20 mg by mouth daily, Disp: , Rfl:      fluticasone (FLOVENT HFA) 220 MCG/ACT Inhaler, Inhale 2 puffs into the lungs 2 times daily, Disp: , Rfl:      KLOR-CON 20 MEQ CR tablet, TAKE 1 TABLET (20 MEQ) BY MOUTH DAILY, Disp: 90 tablet, Rfl: 1     levothyroxine (SYNTHROID/LEVOTHROID) 100 MCG tablet, Take 100 mcg by mouth daily , Disp: , Rfl:      magnesium oxide (MAG-OX) 400 MG tablet, Take 1 tablet  (400 mg) by mouth 2 times daily, Disp: 180 tablet, Rfl: 3     melatonin 3 MG tablet, Take 2 tablets (6 mg) by mouth At Bedtime (Patient taking differently: Take 5 mg by mouth At Bedtime ), Disp: , Rfl:      mesalamine (ASACOL HD) 800 MG EC tablet, Take 1 tablet (800 mg) by mouth 3 times daily, Disp: 270 tablet, Rfl: 1     multivitamin w/minerals (THERA-VIT-M) tablet, Take 1 tablet by mouth daily, Disp: , Rfl:      mycophenolate (GENERIC EQUIVALENT) 250 MG capsule, Take 6 capsules (1,500 mg) by mouth 2 times daily, Disp: 360 capsule, Rfl: 11     omeprazole (PRILOSEC) 40 MG DR capsule, Take 1 capsule (40 mg) by mouth daily, Disp: , Rfl:      rosuvastatin (CRESTOR) 10 MG tablet, Take 1 tablet (10 mg) by mouth daily, Disp: 90 tablet, Rfl: 1     tacrolimus (GENERIC EQUIVALENT) 0.5 MG capsule, Pt currently not taking., Disp: 90 capsule, Rfl: 3     tacrolimus (GENERIC EQUIVALENT) 1 MG capsule, Take 4 capsules (4 mg) by mouth 2 times daily, Disp: 720 capsule, Rfl: 3     acetaminophen (TYLENOL) 325 MG tablet, Take 2 tablets (650 mg) by mouth every 4 hours as needed for mild pain (Patient not taking: Reported on 9/17/2019), Disp: , Rfl:      albuterol (PROAIR HFA/PROVENTIL HFA/VENTOLIN HFA) 108 (90 Base) MCG/ACT inhaler, Inhale 2 puffs into the lungs every 6 hours as needed for shortness of breath / dyspnea or wheezing, Disp: , Rfl:      fluticasone (FLONASE) 50 MCG/ACT nasal spray, Spray 1 spray into both nostrils 2 times daily as needed , Disp: , Rfl:      montelukast (SINGULAIR) 10 MG tablet, Take 1 tablet (10 mg) by mouth At Bedtime, Disp: , Rfl:      ondansetron (ZOFRAN-ODT) 4 MG ODT tab, DISSOLVE 1 TABLET ON THE TONGUE EVERY EIGHT HOURS AS NEEDED, Disp: , Rfl: 3     polyethylene glycol (MIRALAX/GLYCOLAX) powder, Take 1 capful by mouth daily, Disp: , Rfl:      senna (SENOKOT) 8.6 MG tablet, Take 2 tablets by mouth 2 times daily as needed for constipation, Disp: , Rfl:      valGANciclovir (VALCYTE) 450 MG tablet, Take 1  tablet (450 mg) by mouth daily, Disp: 28 tablet, Rfl: 1    SOCIAL HISTORY:  Social History     Socioeconomic History     Marital status: Single     Spouse name: Not on file     Number of children: Not on file     Years of education: Not on file     Highest education level: Not on file   Occupational History     Not on file   Social Needs     Financial resource strain: Not on file     Food insecurity:     Worry: Not on file     Inability: Not on file     Transportation needs:     Medical: Not on file     Non-medical: Not on file   Tobacco Use     Smoking status: Former Smoker     Years: 10.00     Start date: 1980     Last attempt to quit: 2008     Years since quittin.7     Smokeless tobacco: Never Used   Substance and Sexual Activity     Alcohol use: Not Currently     Alcohol/week: 0.6 oz     Types: 1 Cans of beer per week     Comment: a couple times a week      Drug use: No     Sexual activity: Not on file   Lifestyle     Physical activity:     Days per week: Not on file     Minutes per session: Not on file     Stress: Not on file   Relationships     Social connections:     Talks on phone: Not on file     Gets together: Not on file     Attends [employer]ism service: Not on file     Active member of club or organization: Not on file     Attends meetings of clubs or organizations: Not on file     Relationship status: Not on file     Intimate partner violence:     Fear of current or ex partner: Not on file     Emotionally abused: Not on file     Physically abused: Not on file     Forced sexual activity: Not on file   Other Topics Concern     Not on file   Social History Narrative    Everton is a retired . [place] He has no lawn care responsibilities. He will be staying with his folks during his post-hospital early transplant care time. No history of TB exposures.        FAMILY HISTORY:  Family History   Problem Relation Age of Onset     Endometrial Cancer  "Mother      Hypertension Father      Endometrial Cancer Maternal Grandmother        Past/family/social history reviewed and no changes    PHYSICAL EXAMINATION:  Constitutional: aaox3, cooperative, pleasant, not dyspneic/diaphoretic, no acute distress  Vitals reviewed: /75 (BP Location: Left arm, Patient Position: Sitting, Cuff Size: Adult Regular)   Pulse 99   Temp 98.5  F (36.9  C) (Oral)   Resp 20   Ht 1.725 m (5' 7.91\")   Wt 56.3 kg (124 lb 3.2 oz)   SpO2 100%   BMI 18.93 kg/m    Wt:   Wt Readings from Last 2 Encounters:   09/17/19 56.2 kg (123 lb 14.4 oz)   09/17/19 56.3 kg (124 lb 3.2 oz)      Constitutional: cooperative, pleasant, not dyspneic/diaphoretic, no acute distress  Eyes: Sclera anicteric/injected  Ears/nose/mouth/throat: Normal oropharynx without ulcers or exudate, mucus membranes moist, hearing intact  CV: No edema  Respiratory: Unlabored breathing  Abd: Nondistended, +bs, no hepatosplenomegaly, nontender, no peritoneal signs  Skin: warm, perfused, no jaundice  Neuro: AAO x 3  Psych: Normal affect  MSK: No gross deformities    PERTINENT STUDIES:  Most recent CBC:  Recent Labs   Lab Test 09/17/19  1702 09/17/19  0856   WBC 3.7* 3.5*   HGB 10.1* 9.5*   HCT 32.8* 28.5*    279     Most recent hepatic panel:  Recent Labs   Lab Test 09/17/19  0856 07/24/19  1013   ALT 23 26   AST 16 25     Most recent creatinine:  Recent Labs   Lab Test 09/17/19  0856 08/13/19  1200   CR 2.02* 1.26*       Hb 9.5  WBC 3.5  Plts 279    Imaging:  DEXA (4/30/18)  Assessment:    1. The spine bone density L2-L4(L3) with T-score -2.6.  2. Femoral bone densities show left femoral neck T- score -2.4 and right   femoral neck T-score -2.1.  3. Trabecular bone score indicates poor trabecular bone architecture.    "

## 2019-09-17 NOTE — PATIENT INSTRUCTIONS
It was a pleasure taking care of you today.  I've included a brief summary of our discussion and care plan from today's visit below.  Please review this information with your primary care provider.  _____________________________________________________________________    My recommendations are summarized as follows:    - Stop by lab to have your blood work drawn  - You may  your stool sample containers at Lab 1st Floor  before you leave today.  You may drop off the stool samples at any [facility]  - Continue balsalazide for now, [facility]  - We will contact you for scheduling the colonoscopy  - You can contact our GI nurse Roxana at [phone]    Return to GI Clinic in 2-3 months to review your progress.    ____________________________________________________________________    Who do I call with any questions after my visit?  Please be in touch if there are any further questions that arise following today's visit.  There are multiple ways to contact your gastroenterology care team.        During business hours, you may reach a Gastroenterology nurse at [phone] and choose option 3.         To schedule or reschedule an appointment, please call [phone].       You can always send a secure message through [employer].  [employer] messages are answered by your nurse or doctor typically within 24 hours.  Please allow extra time on weekends and holidays.        For urgent/emergent questions after business hours, you may reach the on-call GI Fellow by contacting the [facility] at [phone].     How will I get the results of any tests ordered?    You will receive all of your results.  If you have signed up for [employer], any tests ordered at your visit will be available to you after your physician reviews them.  Typically this takes 1-2 weeks.  If there are urgent results that require a change in your care plan, your physician or nurse  will call you to discuss the next steps.      What is [employer]?  [employer] is a secure way for you to access all of your healthcare records from the [facility].  It is a web based computer program, so you can sign on to it from any location.  It also allows you to send secure messages to your care team.  I recommend signing up for [employer] access if you have not already done so and are comfortable with using a computer.      How to I schedule a follow-up visit?  If you did not schedule a follow-up visit today, please call [phone] to schedule a follow-up office visit.        Sincerely,    Jocelynn Jensen MD  GI Fellow  [facility]  Division of Gastroenterology, Hepatology & Nutrition

## 2019-09-17 NOTE — PROGRESS NOTES
[facility]    Transplant Infectious Diseases Outpatient Progress note      Patient:  Everton Larios, Date of birth 1965, Medical record number [id number]  Date of Visit:  09/17/2019            Recommendations:   1. Agree with infectious workup for diarrhea.   2. Agree with CMV PCR.   3. Will also check EBV PCR.  4. Patient declined shingrix vaccine.      RTC: depends on CMV, EBV, and stool studies.         Summary of Presentation:   Transplants:  2/24/2019 (Heart), Postoperative day:  205     This patient is a 54 year old male with congenital heart disease s/p OHT in 2/2019 with methylprednisolone induction and TAC/MMF/prednisone for maintenance.   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         Active Problems and Infectious Diseases Issues:   1. Diarrhea.   2. 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   In 7/2019 CMV was 3.5 log. It was repeated 9/17/2019 and resulted at 3.9 log.   Though the diarrhea might be due to underlying UC, I am wondering if the diarrhea and abdominal pain are due to CMV colitis.   The CMV replication rate is not high probably due to CMV +/+ serostatus.   The CMV PCR resulted after the patient left the clinic. I will contact him to start VGCV PO.      No blurry vision.     2. P aeruginosa BSI 4/29/2019 with P aeruginosa HCAP.   3. Abnormal CT chest with pulmonary nodules that are improving.   The P aeruginosa BSI and HCAP occurred in the setting of febrile neutropenia.   We treated him for severe pseudomonal pneumonia with 4 week course of ABx. On 5/28/2019 he concluded 4 weeks of cefepime from the first negative blood cx on 4/30/2019. Cefepime was used due to QTc prolongation prohibiting the use of fluoroquinolones.   Repeat CT chest 5/22/2019 showed significant improvement of pulmonary nodules.     Though the CT scan findings can be explained by P aeruginosa pneumonia, I was concerned about  aspergillosis given the pulmonary nodules with GGO, low level of positive BD glucan at 109, and the neutropenia. Hence, I also treated the patient with micafungin starting 5/6/2019 till 6/3/2019 for total of 4 weeks.    Prolonged QTc and high costs prohibited the use of voriconazole/posaconazole and isavuconazole, respectively.      4. Neutropenia.   The neutropenia is likely to be due to drugs.   The most likely culprit drugs are MMF/TAC/VGCV/bactrim.   MMF was decreased, bactrim and VGCV were held. The last dose of bactrim 4/26/2019. CMV PCR is being checked weekly. These measures, in addition to 2 doses of G-CSF on 5/1/2019 and 5/2/2019, resulted in resolution of neutropenia.     MMF was increased again 5/24/2019 given declining EF though no evidence of rejection on biopsy.    Will need to continue to monitor CBC with diff now that the the MMF was increased.   Will need to continue to monitor CMV PCR weekly now that the MMF was increased.         Old Problems and Infectious Diseases Issues:   2. Drug induced neutropenia resolved after stopping bactrim and VGCV.     Other Infectious Disease issues include:  - QTc 502 5/6/2019.   - PCP prophylaxis: off of bactrim since 4/26/2019. Was given pentamidine till 8/2019.   - Serostatus: CMV D+/R+, EBV D+/R+, HSV1+/2-, VZV +               VGCV was discontinued due to neutropenia. CMV PCR was not being checked weekly as recommended by ID in 5/2019.    - Immunization status: good candidate for shingrix. Also repeat 23-valent pneumovax sometime after 9/26/2019.   - Gamma globulin status: 358 5/1/2019.         Attestation:  I interviewed the patient and obtained history from the patient, and by reviewing the patient's chart including outside records, microbiological data, and radiological data. All data are summarized in this notes.  Ric Shukla   Pager: [phone]  09/17/2019           Interim History:   No fever, chills, cough.   For the past 6 weeks he's been  complaining of watery diarrhea up to 10 times a day associated with abdominal pain but no N/V. Described 8 LBS weight loss since the diarrhea started.         HPI:       Transplants:  2/24/2019 (Heart); Postoperative day:  205  55 yo male with congenital heart disease s/p OHT in 2/2019 with methylprednisolone induction and TAC/MMF/prednisone for maintenance.     The course was complicated by neutropenia late 4/2019. He was then admitted with fever, neutropenia dyspnea, and cough.   Was found to have multiple pulmonary nodules with GGO on chest CT. Blood cx on 4/29/2019 and BAL on 4/30/2019 grew P aeruginosa.   Treated with cefepime IV.   Due to multiple risk factors for aspergillosis including neutropenia, also empirically treated with micafunin IV starting 5/6/2019.   We could not use azoles and fluoroquinolones due to prolonged QTc at baseline. Costs prohibited the use of isavuconazole.     Neutropenia resolved after the 2 doses of G-CSF on 5/1/2019 and 5/22019. Also after the discontinuation of VGCV and bactrim and decreasing the dose of MMF.     On 5/24/2019, TTE showed decrease EF. MMF dose was increased though right heart Bx did not show rejection.   Presented with few days of dyspnea, cough, and subjective fever.   Found to have pseudomonal BSI and pneumonia treated with 4 weeks of cefepime. Also treated with micafungin for 4 weeks due to low level positive BD glucan, pulmonary nodules with GGO which may have been due to P aeruginosa infection, also due to neutropenia. cipro and voriconazole were avoided due to prolonged QTc.              Past Medical History:     Past Medical History:   Diagnosis Date     Alcohol abuse      Pierce's esophagus 10/4/2018     Chronic rhinitis 10/4/2018     Chronic systolic heart failure (H) 10/4/2018     Depression 10/4/2018     Diastolic dysfunction      DJD (degenerative joint disease) - neck 10/4/2018     H/O congenital atrial septal defect (ASD) repair at age 3 10/4/2018      Hypothyroidism due to medication 10/4/2018     ICD, [employer] 2008; gen change 2/2018 10/4/2018     Intermittent asthma without complication 10/4/2018     Nonischemic cardiomyopathy (H) 10/4/2018     On amiodarone therapy 10/4/2018     Paroxysmal atrial fibrillation (H) 10/4/2018     S/P ablation of atrial fibrillation 10/4/2018     Systolic heart failure (H)      Ulcerative colitis (H) 10/4/2018     Ventricular tachycardia (H) 10/4/2018             Past Surgical History:     Past Surgical History:   Procedure Laterality Date     AICD, DUAL CHAMBER       BRONCHOSCOPY (RIGID OR FLEXIBLE), DIAGNOSTIC N/A 4/30/2019    Procedure: BRONCHOSCOPY, WITH BAL;  Surgeon: Margot Chiang MD;  Location:  GI     CV HEART BIOPSY N/A 3/4/2019    Procedure: Heart Biopsy;  Surgeon: Abhijeet Titus MD;  Location:  HEART CARDIAC CATH LAB     CV HEART BIOPSY N/A 3/25/2019    Procedure: Heart Biopsy;  Surgeon: Yves Rodrigues MD;  Location:  HEART CARDIAC CATH LAB     CV HEART BIOPSY N/A 3/28/2019    Procedure: Heart Cath Heart Biopsy;  Surgeon: Yves Rodrigues MD;  Location: [facility]     CV HEART BIOPSY N/A 4/10/2019    Procedure: CV HEART BIOPSY;  Surgeon: Isiah Mcallister MD;  Location:  HEART CARDIAC CATH LAB     CV HEART BIOPSY N/A 4/24/2019    Procedure: CV HEART BIOPSY;  Surgeon: Iisah Mcallister MD;  Location:  HEART CARDIAC CATH LAB     CV HEART BIOPSY N/A 5/8/2019    Procedure: CV HEART BIOPSY;  Surgeon: Isiah Mcallister MD;  Location: [facility] LAB     CV HEART BIOPSY N/A 6/4/2019    Procedure: CV HEART BIOPSY;  Surgeon: Alton Solomon MD;  Location:  HEART CARDIAC CATH LAB     CV HEART BIOPSY N/A 5/22/2019    Procedure: CV HEART BIOPSY;  Surgeon: Yves Rodrigues MD;  Location:  HEART CARDIAC CATH LAB     CV HEART BIOPSY N/A 7/17/2019    Procedure: CV HEART BIOPSY;  Surgeon: Isiah Mcallister MD;  Location:  HEART CARDIAC CATH  LAB     CV HEART BIOPSY N/A 8/13/2019    Procedure: CV HEART BIOPSY;  Surgeon: Jackson Patten MD;  Location:  HEART CARDIAC CATH LAB     CV INTRA-AORTIC BALLOON PUMP INSERTION N/A 2/18/2019    Procedure: Intra-Aortic Balloon;  Surgeon: Alton Solomon MD;  Location:  HEART CARDIAC CATH LAB     CV INTRA-AORTIC BALLOON PUMP INSERTION N/A 2/20/2019    Procedure: Replace subclavian IABP;  Surgeon: Yves Rodrigues MD;  Location:  HEART CARDIAC CATH LAB     CV LEFT HEART CATH N/A 3/19/2019    Procedure: Left Heart Cath;  Surgeon: Yves Rodrigues MD;  Location:  HEART CARDIAC CATH LAB     CV RIGHT HEART CATH N/A 3/19/2019    Procedure: RHC/HBx - Femoral access for 3/19 per Nimisha GONZALEZ;  Surgeon: Yves Rodrigues MD;  Location:  HEART CARDIAC CATH LAB     CV RIGHT HEART CATH N/A 3/25/2019    Procedure: Right Heart Cath;  Surgeon: Yves Rodrigues MD;  Location:  HEART CARDIAC CATH LAB     CV RIGHT HEART CATH N/A 3/28/2019    Procedure: Heart Cath Right Heart Cath;  Surgeon: Yves Rodrigues MD;  Location:  HEART CARDIAC CATH LAB     CV RIGHT HEART CATH N/A 4/24/2019    Procedure: CV RIGHT HEART CATH;  Surgeon: Isiah Mcallister MD;  Location:  HEART CARDIAC CATH LAB     CV RIGHT HEART CATH N/A 3/11/2019    Procedure: ADD ON RHC/HBX;  Surgeon: Alton Solomon MD;  Location:  HEART CARDIAC CATH LAB     CV RIGHT HEART CATH N/A 6/4/2019    Procedure: CV RIGHT HEART CATH;  Surgeon: Alton Solomon MD;  Location:  HEART CARDIAC CATH LAB     CV RIGHT HEART CATH N/A 5/22/2019    Procedure: CV RIGHT HEART CATH;  Surgeon: Yves Rodrigues MD;  Location:  HEART CARDIAC CATH LAB     CV RIGHT HEART CATH N/A 8/13/2019    Procedure: CV RIGHT HEART CATH;  Surgeon: Jackson Patten MD;  Location:  HEART CARDIAC CATH LAB     INCISION AND DRAINAGE CHEST WASHOUT, COMBINED N/A 3/21/2019    Procedure: Incision And Drainage; Evacuation Right Chest Wall  Hematoma;  Surgeon: Dewayne House MD;  [facility]     INSERT INTRAAORTIC BALLOON PUMP Left 2019    Procedure: Insert left  Subclavian Balloon Pump,  Removal Right femoral arterial balloom pump sheath;  Surgeon: Dewayne House MD;  [facility]     INSERT INTRAAORTIC BALLOON PUMP Left 2019    Procedure: SUBCLAVIAN BALLOON PUMP PLACEMENT;  Surgeon: Ben White MD;  [facility]     IR PICC PLACEMENT > 5 YRS OF AGE  2019     TRANSPLANT HEART RECIPIENT N/A 2019    Procedure: TRANSPLANT HEART RECIPIENT;  Surgeon: Dewayne House MD;  [facility]               Social History:     Social History     Tobacco Use     Smoking status: Former Smoker     Years: 10.00     Start date: 1980     Last attempt to quit: 2008     Years since quittin.7     Smokeless tobacco: Never Used   Substance Use Topics     Alcohol use: Not Currently     Alcohol/week: 0.6 oz     Types: 1 Cans of beer per week     Comment: a couple times a week              Family History:     Family History   Problem Relation Age of Onset     Endometrial Cancer Mother      Hypertension Father      Endometrial Cancer Maternal Grandmother              Immunizations:     Immunization History   Administered Date(s) Administered     Influenza (IIV3) PF 10/22/2008, 2009, 2011, 2012, 10/27/2013, 10/02/2014     Influenza Vaccine IM > 6 months Valent IIV4 10/02/2015     Influenza Vaccine, 3 YRS +, IM (QUADRIVALENT W/PRESERVATIVES) 2016, 10/16/2017, 2018     OPV, trivalent, live 10/20/1978     Pneumo Conj 13-V (2010&after) 2018     TDAP Vaccine (Boostrix) 2012     Td (Adult), Adsorbed 10/20/1978             Allergies:     Allergies   Allergen Reactions     Hydromorphone Other (See Comments)     Significant Delirium     Adhesive Tape Blisters     Tegaderm causes bruises, blisters and extreme irritation.     Lisinopril Other (See Comments)     hypotension      Quinolones Other (See Comments)     Would not rx quinolones until QTc interval improved.      Tobramycin Other (See Comments)     Would not use aminoglycosides as his kidney function is very borderline     Codeine Nausea             Medications:     Current Outpatient Medications   Medication Sig     acetaminophen (TYLENOL) 325 MG tablet Take 2 tablets (650 mg) by mouth every 4 hours as needed for mild pain (Patient not taking: Reported on 9/17/2019)     albuterol (PROAIR HFA/PROVENTIL HFA/VENTOLIN HFA) 108 (90 Base) MCG/ACT inhaler Inhale 2 puffs into the lungs every 6 hours as needed for shortness of breath / dyspnea or wheezing     amLODIPine (NORVASC) 5 MG tablet TAKE 1 TABLET BY MOUTH EVERY DAY     aspirin (ASA) 81 MG chewable tablet Take 1 tablet (81 mg) by mouth daily     balsalazide (COLAZAL) 750 MG capsule TAKE 3 CAPSULES BY MOUTH TWICE DAILY     calcium carbonate 600 mg-vitamin D 400 units (CALTRATE) 600-400 MG-UNIT per tablet Take 1 tablet by mouth 2 times daily (with meals)     cetirizine (ZYRTEC) 10 MG tablet Take 10 mg by mouth daily     DULoxetine (CYMBALTA) 20 MG EC capsule Take 20 mg by mouth daily     fluticasone (FLONASE) 50 MCG/ACT nasal spray Spray 1 spray into both nostrils 2 times daily as needed      fluticasone (FLOVENT HFA) 220 MCG/ACT Inhaler Inhale 2 puffs into the lungs 2 times daily     KLOR-CON 20 MEQ CR tablet TAKE 1 TABLET (20 MEQ) BY MOUTH DAILY     levothyroxine (SYNTHROID/LEVOTHROID) 100 MCG tablet Take 100 mcg by mouth daily      magnesium oxide (MAG-OX) 400 MG tablet Take 1 tablet (400 mg) by mouth 2 times daily     melatonin 3 MG tablet Take 2 tablets (6 mg) by mouth At Bedtime (Patient taking differently: Take 5 mg by mouth At Bedtime )     montelukast (SINGULAIR) 10 MG tablet Take 1 tablet (10 mg) by mouth At Bedtime     multivitamin w/minerals (THERA-VIT-M) tablet Take 1 tablet by mouth daily     mycophenolate (GENERIC EQUIVALENT) 250 MG capsule Take 6 capsules (1,500 mg) by  mouth 2 times daily     omeprazole (PRILOSEC) 40 MG DR capsule Take 1 capsule (40 mg) by mouth daily     ondansetron (ZOFRAN-ODT) 4 MG ODT tab DISSOLVE 1 TABLET ON THE TONGUE EVERY EIGHT HOURS AS NEEDED     polyethylene glycol (MIRALAX/GLYCOLAX) powder Take 1 capful by mouth daily     rosuvastatin (CRESTOR) 10 MG tablet Take 1 tablet (10 mg) by mouth daily     senna (SENOKOT) 8.6 MG tablet Take 2 tablets by mouth 2 times daily as needed for constipation     tacrolimus (GENERIC EQUIVALENT) 0.5 MG capsule Pt currently not taking.     tacrolimus (GENERIC EQUIVALENT) 1 MG capsule Take 4 capsules (4 mg) by mouth 2 times daily     No current facility-administered medications for this visit.             Review of Systems:   As mentioned in the interim history otherwise negative by reviewing constitutional symptoms, central and peripheral neurological systems, respiratory system, cardiac system, GI system,  system, musculoskeletal, skin, allergy, and lymphatics.                  Physical Exam:     Vitals:    09/17/19 1540   BP: 112/75   Pulse: 99   Temp: 98.5  F (36.9  C)   TempSrc: Oral   SpO2: 100%   Weight: 56.2 kg (123 lb 14.4 oz)     Wt Readings from Last 4 Encounters:   09/17/19 56.2 kg (123 lb 14.4 oz)   09/17/19 56.3 kg (124 lb 3.2 oz)   08/13/19 60.1 kg (132 lb 9.6 oz)   07/17/19 60.1 kg (132 lb 9.6 oz)       Constitutional: awake, alert, cooperative, no apparent distress and appears at stated age, well nourished.   ENNT: PERRL, EOMI, pink conjunctivae, non-icteric sclera.   Neck supple without rigidity, no cervical/axillary/inguinal LA bilaterally.   Neurologic: Patient is moving all extremities without focal deficit, no focal sensory loss.   Lungs: CTA bilaterally, no accessory muscle use, no dullness to percussion and no abnormal tactile fremitus.   CVS: RRR, normal S1/S2, no murmur, PMI was not displaced.   Abdomen: non-tender, non-distended, no masses, no bruit, no shifting dullness, normal BS.    Extremities: no pitting edema of bilateral lower extremities, no ulcers, normal ROM of all joints, no swelling or erythema of any of joints and no tenderness to palpation.   Skin: no induration, fluctuation or discharge, and no rash.             Laboratory Data:     No results found for: ACD4    Inflammatory Markers    Recent Labs   Lab Test 05/15/19  0940 05/02/19  0536 04/29/19  0911 11/15/18  0955 10/22/18  1239   SED  --  37*  --   --   --    CRP <2.9 69.0* 120.0* 6.1 12.5       Immune Globulin Studies      Recent Labs   Lab Test 05/01/19  1850   *   IGM 29*   IGE 38          Metabolic Studies    Recent Labs   Lab Test 09/17/19  0856 08/13/19  1200 07/24/19  1013 07/17/19  1030 06/26/19  0936 06/17/19  0926  05/22/19  0926  04/29/19  0918 04/29/19  0911  02/23/19  1835    138 132* 135 139 137   < > 138   < >  --  131*   < > 133   POTASSIUM 4.1 4.6 4.3 4.2 4.0 4.1   < > 3.7   < >  --  3.5   < > 3.6   CHLORIDE 110* 106 103 103 105 104   < > 102   < >  --  94   < > 93*   CO2 PENDING 22 22 24 23 20   < > 25   < >  --  24   < > 30   ANIONGAP PENDING 10 7 8 11 13   < > 10   < >  --  12   < > 10   BUN PENDING 32* 18 17 26 27   < > 53*   < >  --  23   < > 28   CR PENDING 1.26* 1.10 1.07 1.50* 1.39*   < > 2.43*   < >  --  1.04   < > 1.03   GFRESTIMATED PENDING 63 74 77 51* 56*   < > 29*   < >  --  80   < > 81   GLC PENDING 80 88 80 80 80   < > 82   < >  --  79   < > 114*   A1C  --   --   --   --   --   --   --   --   --   --   --   --  5.7*   RADAMES PENDING 9.2 8.6 8.9 9.0 8.8   < > 9.3   < >  --  9.0   < > 8.9   PHOS  --   --   --  4.0  --  3.8   < > 4.2   < >  --   --    < > 3.6   MAG  --   --   --  1.7  --  1.4*   < > 1.9   < >  --   --    < > 2.0   LACT  --   --   --   --   --   --   --   --   --  1.7 1.6   < >  --    CKT  --   --   --  34  --  46  --  26*  --   --   --   --   --     < > = values in this interval not displayed.       Hepatic Studies    Recent Labs   Lab Test 09/17/19  0848  07/24/19  1013 06/26/19  0936 06/17/19  0926 05/28/19  1421 05/25/19  0006  03/14/19  1653  03/10/19  0410   BILITOTAL PENDING 0.5 0.5 0.4 0.4 0.3   < >  --    < > 0.8   ALKPHOS PENDING 268* 160* 135 111 91   < >  --    < > 104   PROTTOTAL PENDING 6.4* 7.1 6.6* 6.8 6.5*   < >  --    < > 5.0*   ALBUMIN PENDING 3.5 3.8 3.8 3.2* 3.1*   < >  --    < > 2.8*   AST PENDING 25 21 19 11 16   < >  --    < > 22   ALT PENDING 26 15 17 16 15   < >  --    < > 19   LDH  --   --   --   --   --   --   --  320*  --  276*    < > = values in this interval not displayed.       Hematology Studies     Recent Labs   Lab Test 09/17/19  0856 08/13/19  1200 07/24/19  1013 07/17/19  1030 06/26/19  0936 06/17/19  0926 05/24/19  1655   WBC 3.5* 3.6* 4.9 4.9 5.4 6.4   < > 8.9   ANEU 1.9  --  2.6 2.7 2.7 4.0  --  7.6   ALYM 0.9  --  1.1 1.0 1.2 1.1  --  0.8   GRACE 0.5  --  0.7 0.6 1.1 1.1  --  0.2   AEOS 0.3  --  0.3 0.4 0.3 0.1  --  0.1   HGB 9.5* 9.2* 9.3* 9.3* 10.5* 9.6*   < > 9.0*   HCT 28.5* 30.3* 29.9* 31.0* 32.9* 29.6*   < > 28.1*    273 251 244 262 289   < > 191    < > = values in this interval not displayed.       Clotting Studies    Recent Labs   Lab Test 05/08/19  0504 05/07/19  0451 05/06/19  0438 05/05/19  0720  03/14/19  1653  02/27/19  0421 02/25/19  1502 02/24/19  1817   INR 1.22* 1.33* 1.31* 1.33*   < > 1.36*   < >  --  1.29* 1.50*   PTT  --   --   --   --   --  46*  --  38* 32 55*    < > = values in this interval not displayed.       Urine Studies    Recent Labs   Lab Test 05/26/19  1006 04/29/19  1031 03/26/19  1707 03/17/19  0045 03/04/19  1303   URINEPH 6.5 6.0 5.5 5.5 5.0   NITRITE Negative Negative Negative Negative Negative   LEUKEST Negative Negative Negative Negative Negative   WBCU 0 <1 2 15* 0         Microbiology:  Last 6 Culture results with specimen source  Culture Micro   Date Value Ref Range Status   05/02/2019 No growth  Final   05/02/2019 No growth  Final   05/02/2019 (A)  Final    Canceled, Test  credited  >10 Squamous epithelial cells/low power field indicates oral contamination. Please   recollect.     05/01/2019 No growth  Final   05/01/2019 No growth  Final   04/30/2019 (A)  Final    Canceled, Test credited  >10 Squamous epithelial cells/low power field indicates oral contamination. Please   recollect.     04/30/2019   Final    Notification of test cancellation was given to  ASHLIE SCOTT, RN [id number] 4.30.19 ND      Specimen Description   Date Value Ref Range Status   05/02/2019 Blood Right Hand  Final   05/02/2019 Blood Right Hand  Final   05/02/2019 Sputum  Final   05/02/2019 Sputum  Final   05/01/2019 Blood Left Arm  Final   05/01/2019 Blood Right Hand  Final        Last check of C difficile  C Diff Toxin B PCR   Date Value Ref Range Status   04/29/2019 Negative NEG^Negative Final     Comment:     Negative: Clostridium difficile target DNA sequences NOT detected, presumed   negative for Clostridium difficile toxin B or the number of bacteria present   may be below the limit of detection for the test.  FDA approved assay performed using [employer] GeneXpert real-time PCR.  A negative result does not exclude actual disease due to Clostridium difficile   and may be due to improper collection, handling and storage of the specimen   or the number of organisms in the specimen is below the detection limit of the   assay.           Virology:  CMV viral loads    CMV viral loads    Recent Labs   Lab Test 07/24/19  1013 06/26/19  0935   CSPEC EDTA PLASMA EDTA PLASMA   CMVLOG 3.5* <2.1       CMV viral loads    Log IU/mL of CMVQNT   Date Value Ref Range Status   07/24/2019 3.5 (H) <2.1 [Log_IU]/mL Final   06/26/2019 <2.1 <2.1 [Log_IU]/mL Final   06/04/2019 Not Calculated <2.1 [Log_IU]/mL Final   05/24/2019 Not Calculated <2.1 [Log_IU]/mL Final   05/22/2019 Not Calculated <2.1 [Log_IU]/mL Final   05/15/2019 Not Calculated <2.1 [Log_IU]/mL Final   05/06/2019 Not Calculated <2.1 [Log_IU]/mL Final   04/30/2019 Not  Calculated <2.1 [Log_IU]/mL Final   04/29/2019 Not Calculated <2.1 [Log_IU]/mL Final   04/26/2019 Not Calculated <2.1 [Log_IU]/mL Final   04/24/2019 Not Calculated <2.1 [Log_IU]/mL Final   04/17/2019 Not Calculated <2.1 [Log_IU]/mL Final   04/08/2019 Not Calculated <2.1 [Log_IU]/mL Final   04/01/2019 Not Calculated <2.1 [Log_IU]/mL Final   03/25/2019 Not Calculated <2.1 [Log_IU]/mL Final   03/18/2019 Not Calculated <2.1 [Log_IU]/mL Final       CMV resistance testing  No lab results found.  No results found for: CMVCID, CMVFOS, CMVGAN     EBV viral loads     Recent Labs   Lab Test 05/22/19  0926 04/29/19  0911 04/26/19  0848 03/27/19  0521   EBRES 541* EBV DNA Not Detected EBV DNA Not Detected EBV DNA Not Detected     EBV DNA Copies/mL   Date Value Ref Range Status   05/22/2019 541 (A) EBVNEG^EBV DNA Not Detected [Copies]/mL Final   04/29/2019 EBV DNA Not Detected EBVNEG^EBV DNA Not Detected [Copies]/mL Final   04/26/2019 EBV DNA Not Detected EBVNEG^EBV DNA Not Detected [Copies]/mL Final   03/27/2019 EBV DNA Not Detected EBVNEG^EBV DNA Not Detected [Copies]/mL Final       Human Herpes Virus 6 viral loads    No results found for: H6RES No results found for: H6SPEC    CMV Antibody IgG   Date Value Ref Range Status   02/23/2019 >8.0 (H) 0.0 - 0.8 AI Final     Comment:     Positive  Antibody index (AI) values reflect qualitative changes in antibody   concentration that cannot be directly associated with clinical condition or   disease state.     02/15/2019 >8.0 (H) 0.0 - 0.8 AI Final     Comment:     Positive  Antibody index (AI) values reflect qualitative changes in antibody   concentration that cannot be directly associated with clinical condition or   disease state.       Toxoplasma Antibody IgG   Date Value Ref Range Status   02/22/2019 <3.0 0.0 - 7.1 IU/mL Final     Comment:     Negative- Absence of detectable Toxoplasma gondii IgG antibodies. A negative   result does not rule out acute infection.  The magnitude of  the measured result is not indicative of the amount of   antibody present. The concentrations of anti-Toxoplasma gondii IgG in a given   specimen determined with assays from different manufacturers can vary due to   differences in assay methods and reagent specificity.         Pathology:  Heart bx 5/22/2019  FINAL DIAGNOSIS:   Heart, allograft, right ventricle, endomyocardial biopsy:   - Acute cellular rejection: Focal mild rejection, Grade 1R/1A (ISHLT 2004)        - Antibody-mediated rejection: No evidence of antibody-mediated   rejection        - C3d and C4d are negative (see comment)     BAL 4/30/2019   Lung, lingula, bronchoalveolar lavage:    -Negative for malignancy    -No fungal or Pneumocystis organisms are identified on GMS stained   preparations.    -Viral cytopathic effect is absent.   Specimen Adequacy: Satisfactory for evaluation.       Imaging:  CT chest 5/22/2019 reviewed by myself.   IMPRESSION:   1. Significantly decreased bilateral nodular and consolidative  opacities from 4/29/2019. No new or worsening pulmonary opacities.  These findings are likely representative of a improving infection.  2. Stable subacute T12 vertebral body compression deformity.    Results for orders placed or performed during the hospital encounter of 05/24/19   US Renal Complete    Narrative    EXAMINATION: US RENAL COMPLETE, 5/25/2019 12:30 PM     COMPARISON: 3/26/2018    HISTORY: [facility]-patient post heart transplant     FINDINGS:    Right kidney: Measures 12.3 cm in length. Parenchyma is of normal  thickness. Mildly increased cortical echogenicity. No focal mass. No  hydronephrosis.    Left kidney: Measures 10.4 cm in length. Parenchyma is of normal  thickness. Mildly increased cortical echogenicity. No focal mass. No  hydronephrosis.     Bladder: Unremarkable.      Impression    IMPRESSION:  Mildly increased cortical echogenicity bilaterally, suggestive of  medical renal disease. No hydronephrosis.    I have personally  reviewed the examination and initial interpretation  and I agree with the findings.    RUFUS CORDERO MD       Cardiology   TTE 5/25/2019  Interpretation Summary  Moderate concentric wall thickening consistent with left ventricular  hypertrophy is present.  Mildly (EF 40-45%) reduced left ventricular function is present.  Mild right ventricular dilation and right ventricular function is mildly  reduced.  Moderate mitral insufficiency.  Mild to moderate tricuspid insufficiency.  Moderate pulmonary hypertension with dilated IVC.     This study was compared with the study from 5/22/19 and MR is more prominent.

## 2019-09-17 NOTE — LETTER
2019       RE: Everton Larios  [address] [id number]     Dear Colleague,    Thank you for referring your patient, Everton Larios, to the [facility] GASTROENTEROLOGY AND IBD CLINIC at [facility]. Please see a copy of my visit note below.    IBD CLINIC VISIT    CC/REFERRING MD:  Elin Jensen  REASON FOR CONSULTATION: Establish care for UC Pancolitis    IBD HISTORY  IBD type: UC pancolitis (patient report)  Age at diagnosis: (diagnosed >10 years ago)  Endoscopic extent of disease: Quiescent disease on last colonoscopy ()  Histologic: Quiescent on last colonoscopy (2017)  Current IBD medications: Balsalazide  Prior IBD surgeries: None  Prior IBD Medications:  - Steroids (many years ago)  - Asacol (stopped because insurance did not cover)    DRUG MONITORING  TPMT enzyme activity:  N/A    6-TGN/6-MMPN levels: N/A    Biologic concentration: N/A    DISEASE ASSESSMENT  Labs  Recent Labs   Lab Test 19  1702 05/15/19  0940 19  0536   CRP <2.9 <2.9 69.0*   SED 18  --  37*     Fecal calprotectin: To be collected  Endoscopy: Quiescent , colonoscopy to be scheduled  Enterography: No recent  C diff: To be collected    Yadav score:   Stool freq: 3 (>4 stools/day more than normal)  Rectal bleedin (None)  PGA: 2 (Moderate)  Endoscopy: Not done    Remission: <3   Mild disease: 3-5  Moderate disease: 6-10  Severe disease: >10    ASSESSMENT/PLAN:  Everton Larios is a 54 year old male with a history of ASD (s/p repair in childhood), NICM 2/p OHT (19) on tacrolimus & MMF for IST, ulcerative pancolitis (diagnosed  on balsalazide), Luisito's Esophagus who was referred to our clinic to establish care for his ulcerative colitis.    #. 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 Plan to evaluate with stool studies and colonoscopy to include biopsies. He is also due for surveillance colonoscopy (last 2017 with quiescent UC).    #. Ulcerative pancolitis:  Previoulsy quiescent since 2017 on Balsalazide monotherapy. Patient was following with [facility]. We do not have any clinic notes from these vitis - patient will need to sign PHAM to have these records faxed over. He inquires about changing to Asacol as his insurance will now cover this, OK for now pending colonoscopy results.    #. Elevated alkaline phosphatase:  Rising since 6/17/19, now 285. Will evaluate for PSC with MRCP given history of UC. Total bilirubin, AST/ALT remain normal.    #. Acute kidney injury with NGMA:  Cr rising since 8/2019, now 2.02 (baseline ~1.1). Suspect related to known diarrhea given concurrent NGMA. Will encourage oral rehydration and follow creatinine - will alert primary cardiology teams given IST and primary care provider as well.    #. 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 Next due 2020.    #. Osteoporosis:  Follows with PCP. Last DEXA 2018. On calcium and vitamin D, declined further use of Prolia per PCP notes.    RECOMMENDATIONS:  -- Stool Studies: Enteric panel, cryptosporidium, Giardia, Ova & Parasite, Fecal calprotectin  -- Labs: CBC, CMP (per ID), GGT added on to liver panel  -- Colonoscopy with MAC to be scheduled, include random biopsies for dysplasia, microscopic colitis, MMF and targeted biopsies if any ulcerations concerning for CMV  -- Schedule MRCP  -- OK to switch to Asacol from balsalazide  -- Next EGD for Pierce's surveillance due 2020  -- Patient to sign PHAM for [facility] records (clinic notes and endoscopic procedures prior to 2017)  -- Continue Calcium and Vitamin D supplementation  -- Maintain  adequate hydration in setting of diarrhea  -- Continue to avoid all NSAIDs  -- RTC in 6 weeks after colonoscopy    IBD Health Care Maintenance:    Vaccinations:  All patients on biologics should avoid live vaccines.    -- Influenza (every year)  -- TdaP (every 10 years)  -- Pneumococcal Pneumonia (once plus booster at 5 years)  -- Yearly assessment for latent Tb (verbal screening and exam, PPD or QuantiFERON-Tb testing)    One time confirmation of immunity or serologies:  -- Hepatitis A (serologies or immunizations)  -- Hepatitis B (serologies or immunizations)  -- Varicella  -- MMR  -- HPV (all aged 18-26)  -- Meningococcal meningitis (all patients at risk for meningitis)  -- Due to the immunosuppression in this patient, I would not advise administration of live vaccines such as varicella/VZV, intranasal influenza, MMR, or yellow fever vaccine (if travelling).      Bone mineral density screening   -- Recommend all patients supplement with calcium and vitamin D    Cancer Screening:  Colon cancer screening:   Given >10 years of pancolonic disease, recommend patient undergo regular dysplasia surveillance   Next dysplasia screening is recommended 2019 (to be scheduled).    Skin cancer screening: Annual visual exam of skin by dermatologist since patient is immunocompromised    Misc:  -- Avoid tobacco use  -- Avoid NSAIDs as there is potentially a 25% chance of causing an IBD flare    Return to clinic in 6 weeks    Pt care plan discussed with Dr. Allen, GI staff physician.    Jocelynn Jensen MD  GI Fellow  [phone]    HPI:   Everton Larios is a 54 year old male with a history of AST (s/p repair in childhood), NICM 2/p OHT (2/24/19) on tacrolimus & MMF for IST, ulcerative pancolitis (diagnosed 2005 on balsalazide) who was referred to our clinic to establish care for his ulcerative colitis.    He reports that he has been on balsalazide for years for his UC until July 20th when he stopped the Balsalazide and was  started on linzess for constipation. With the initiation of linzess he started to have diarrhea so he stopped this medication (think she was taking the 140mcg dose). He resumed his Balsalazide about 4 weeks ago.     Currently, he describes having low left to mid abdominal pain that is constant 5-8/10 in severity. The pain does keep him up at night, feels like it is worse at night. He is having >10 bowel movements daily that he describes as type 6-7 on the bristol stool scale. He is not having any blood in his stool. He has lost about 7-8 lbs since August that is unintentional. He describes that most of the foods he eats don't taste good or give him a lot of gas (1-2 hours later) and abdominal discomfort. He states he is only really able to keep down peanutbutter, chocolate chip cookies or breakfast cereal.    He also reports a lot of fatigue, and occasional nausea when he takes his morning medications if he does not spread them out.     ROS:    No fevers or chills  Weight loss  No blurry vision, double vision or change in vision  No sore throat  No lymphadenopathy  No headache, paraesthesias, or weakness in a limb  No shortness of breath or wheezing  No chest pain or pressure  No arthralgias or myalgias  No rashes or skin changes  No odynophagia or dysphagia  No BRBPR, hematochezia, melena  No dysuria, frequency or urgency  No hot/cold intolerance or polyria  No anxiety or depression    Extra intestinal manifestations of IBD:  No uveitis/episcleritis  No aphthous ulcers   No arthritis   No erythema nodosum/pyoderma gangrenosum.     PERTINENT PAST MEDICAL HISTORY:  Past Medical History:   Diagnosis Date     Alcohol abuse      Pierce's esophagus 10/4/2018     Chronic rhinitis 10/4/2018     Chronic systolic heart failure (H) 10/4/2018     Depression 10/4/2018     Diastolic dysfunction      DJD (degenerative joint disease) - neck 10/4/2018     H/O congenital atrial septal defect (ASD) repair at age 3 10/4/2018      Hypothyroidism due to medication 10/4/2018     ICD, [employer] 2008; gen change 2/2018 10/4/2018     Intermittent asthma without complication 10/4/2018     Nonischemic cardiomyopathy (H) 10/4/2018     On amiodarone therapy 10/4/2018     Paroxysmal atrial fibrillation (H) 10/4/2018     S/P ablation of atrial fibrillation 10/4/2018     Systolic heart failure (H)      Ulcerative colitis (H) 10/4/2018     Ventricular tachycardia (H) 10/4/2018       PREVIOUS SURGERIES:  Past Surgical History:   Procedure Laterality Date     AICD, DUAL CHAMBER       BRONCHOSCOPY (RIGID OR FLEXIBLE), DIAGNOSTIC N/A 4/30/2019    Procedure: BRONCHOSCOPY, WITH BAL;  Surgeon: Margot Chiang MD;  Location:  GI     CV HEART BIOPSY N/A 3/4/2019    Procedure: Heart Biopsy;  Surgeon: Abhijeet Titus MD;  Location:  HEART CARDIAC CATH LAB     CV HEART BIOPSY N/A 3/25/2019    Procedure: Heart Biopsy;  Surgeon: Yves Rodrigues MD;  Location:  HEART CARDIAC CATH LAB     CV HEART BIOPSY N/A 3/28/2019    Procedure: Heart Cath Heart Biopsy;  Surgeon: Yves Rodrigues MD;  Location:  HEART CARDIAC CATH LAB     CV HEART BIOPSY N/A 4/10/2019    Procedure: CV HEART BIOPSY;  Surgeon: Isiah Mcallister MD;  Location:  HEART CARDIAC CATH LAB     CV HEART BIOPSY N/A 4/24/2019    Procedure: CV HEART BIOPSY;  Surgeon: Isiah Mcallister MD;  Location:  HEART CARDIAC CATH LAB     CV HEART BIOPSY N/A 5/8/2019    Procedure: CV HEART BIOPSY;  Surgeon: Isiah Mcallister MD;  Location: [facility]ARD[facility]     CV HEART BIOPSY N/A 6/4/2019    Procedure: CV HEART BIOPSY;  Surgeon: Alton Solomon MD;  Location:  HEART CARDIAC CATH LAB     CV HEART BIOPSY N/A 5/22/2019    Procedure: CV HEART BIOPSY;  Surgeon: Yves Rodrigues MD;  Location:  HEART CARDIAC CATH LAB     CV HEART BIOPSY N/A 7/17/2019    Procedure: CV HEART BIOPSY;  Surgeon: Isiah Mcallister MD;  Location:  HEART CARDIAC CATH LAB     CV HEART  BIOPSY N/A 8/13/2019    Procedure: CV HEART BIOPSY;  Surgeon: Jackson Patten MD;  Location:  HEART CARDIAC CATH LAB     CV INTRA-AORTIC BALLOON PUMP INSERTION N/A 2/18/2019    Procedure: Intra-Aortic Balloon;  Surgeon: Alton Solomon MD;  Location:  HEART CARDIAC CATH LAB     CV INTRA-AORTIC BALLOON PUMP INSERTION N/A 2/20/2019    Procedure: Replace subclavian IABP;  Surgeon: Yves Rodrigues MD;  Location:  HEART CARDIAC CATH LAB     CV LEFT HEART CATH N/A 3/19/2019    Procedure: Left Heart Cath;  Surgeon: Yves Rodrigues MD;  Location:  HEART CARDIAC CATH LAB     CV RIGHT HEART CATH N/A 3/19/2019    Procedure: RHC/HBx - Femoral access for 3/19 per Nimisha GONZALEZ;  Surgeon: Yves Rodrigues MD;  Location:  HEART CARDIAC CATH LAB     CV RIGHT HEART CATH N/A 3/25/2019    Procedure: Right Heart Cath;  Surgeon: Yves Rodrigues MD;  Location:  HEART CARDIAC CATH LAB     CV RIGHT HEART CATH N/A 3/28/2019    Procedure: Heart Cath Right Heart Cath;  Surgeon: Yves Rodrigues MD;  Location:  HEART CARDIAC CATH LAB     CV RIGHT HEART CATH N/A 4/24/2019    Procedure: CV RIGHT HEART CATH;  Surgeon: Isiah Mcallister MD;  Location:  HEART CARDIAC CATH LAB     CV RIGHT HEART CATH N/A 3/11/2019    Procedure: ADD ON RHC/HBX;  Surgeon: Alton Solomon MD;  Location:  HEART CARDIAC CATH LAB     CV RIGHT HEART CATH N/A 6/4/2019    Procedure: CV RIGHT HEART CATH;  Surgeon: Alton Solomon MD;  Location:  HEART CARDIAC CATH LAB     CV RIGHT HEART CATH N/A 5/22/2019    Procedure: CV RIGHT HEART CATH;  Surgeon: Yves Rodrigues MD;  Location:  HEART CARDIAC CATH LAB     CV RIGHT HEART CATH N/A 8/13/2019    Procedure: CV RIGHT HEART CATH;  Surgeon: Jackson Patten MD;  Location:  HEART CARDIAC CATH LAB     INCISION AND DRAINAGE CHEST WASHOUT, COMBINED N/A 3/21/2019    Procedure: Incision And Drainage; Evacuation Right Chest Wall Hematoma;   Surgeon: Dewayne House MD;  [facility]     INSERT INTRAAORTIC BALLOON PUMP Left 2/19/2019    Procedure: Insert left  Subclavian Balloon Pump,  Removal Right femoral arterial balloom pump sheath;  Surgeon: Dewayne House MD;  [facility]     INSERT INTRAAORTIC BALLOON PUMP Left 2/21/2019    Procedure: SUBCLAVIAN BALLOON PUMP PLACEMENT;  Surgeon: Ben White MD;  [facility]: [facility]     IR PICC PLACEMENT > 5 YRS OF AGE  5/8/2019     TRANSPLANT HEART RECIPIENT N/A 2/24/2019    Procedure: TRANSPLANT HEART RECIPIENT;  Surgeon: Dewayne House MD;  [facility]: [facility]       PREVIOUS ENDOSCOPY:    Colonoscopy (12/19/17):      EGD (12/19/17):    Path (12/2017):  A) RANDOM BIOPSIES OF RIGHT COLON:       - NO DIAGNOSTIC ABNORMALITY (MILD MELANOSIS COLI PRESENT)    B) BIOPSIES OF TRANSVERSE COLON:       - NO DIAGNOSTIC ABNORMALITY (MILD MELANOSIS COLI PRESENT)    C) BIOPSIES OF LEFT COLON:       - NO DIAGNOSTIC ABNORMALITY       - HYPERTROPHIC LYMPHOID NODULE IN ONE BIOPSY FRAGMENT       - MILD MELANOSIS COLI IN TWO BIOPSY FRAGMENTS    D) BIOPSIES OF RECTOSIGMOID COLON:       - NEGATIVE FOR ACTIVE COLITIS       - NEGATIVE FOR CHRONIC INFLAMMATORY CHANGES       - NO EPITHELIAL ATYPIA       - MILD MELANOSIS COLI PRESENT    E) BIOPSIES OF DESCENDING COLON:       - MILD CHRONIC INFLAMMATORY STROMAL INFILTRATE, WITH FEATURES           OF LYMPHOCYTIC COLITIS       - MILD MELANOSIS COLI PRESENT       - NEGATIVE FOR ACUTE COLITIS       - NEGATIVE FOR EPITHELIAL ATYPIA OR DYSPLASIA    F) BIOPSIES OF SIGMOID COLON:        - HYPERPLASTIC POLYP    G) BIOPSIES OF SIGMOID COLON:       - FOCAL AREA OF LYMPHANGIECTASIA IN BASAL MUCOSA/MUSCULARIS           MUCOSAE       - NO TRUE MUCOSAL POLYP ARCHITECTURE IDENTIFIED    H) BIOPSIES OF RECTAL POLYP:       - BENIGN LEIOMYOMA OF MUSCULARIS MUCOSAE       - NORMAL OVERLYING MUCOSA    I) RECTAL BIOPSY TISSUE:       - CRYPT CHANGES CONSISTENT WITH EARLY HYPERPLASTIC  POLYP       - HYPERTROPHIC MUCOSAL LYMPHOID NODULE    J) GASTRIC BIOPSY TISSUE:       - FOCAL AREA OF FUNDIC GLAND DILATATION, SUGGESTIVE OF EARLY           FUNDIC GLAND POLYP       - MINIMAL CHRONIC INFLAMMATORY STROMAL INFILTRATE, DIFFUSE            DISTRIBUTION       - NEGATIVE FOR ACTIVE GASTRITIS       - NEGATIVE FOR INTESTINAL METAPLASIA OR EPITHELIAL DYSPLASIA       - NEGATIVE FOR HELICOBACTER ORGANISMS BY IMMUNOHISTOCHEMISTRY    K) BIOPSIES OF DISTAL ESOPHAGUS:       - LEA-TYPE INTESTINAL METAPLASIA, EXTENSIVE IN BIOPSY,            CONSISTENT WITH LEA ESOPHAGUS       - MILD CHRONIC INFLAMMATORY STROMAL INFILTRATE (LEA            ESOPHAGITIS)       - NEGATIVE FOR ACUTE ESOPHAGITIS       - NEGATIVE FOR HIGH-GRADE EPITHELIAL DYSPLASIA    ALLERGIES:     Allergies   Allergen Reactions     Hydromorphone Other (See Comments)     Significant Delirium     Adhesive Tape Blisters     Tegaderm causes bruises, blisters and extreme irritation.     Lisinopril Other (See Comments)     hypotension     Quinolones Other (See Comments)     Would not rx quinolones until QTc interval improved.      Tobramycin Other (See Comments)     Would not use aminoglycosides as his kidney function is very borderline     Codeine Nausea       PERTINENT MEDICATIONS:    Current Outpatient Medications:      amLODIPine (NORVASC) 5 MG tablet, TAKE 1 TABLET BY MOUTH EVERY DAY, Disp: 30 tablet, Rfl: 2     aspirin (ASA) 81 MG chewable tablet, Take 1 tablet (81 mg) by mouth daily, Disp: , Rfl:      balsalazide (COLAZAL) 750 MG capsule, TAKE 3 CAPSULES BY MOUTH TWICE DAILY, Disp: , Rfl:      calcium carbonate 600 mg-vitamin D 400 units (CALTRATE) 600-400 MG-UNIT per tablet, Take 1 tablet by mouth 2 times daily (with meals), Disp: , Rfl:      cetirizine (ZYRTEC) 10 MG tablet, Take 10 mg by mouth daily, Disp: , Rfl:      DULoxetine (CYMBALTA) 20 MG EC capsule, Take 20 mg by mouth daily, Disp: , Rfl:      fluticasone (FLOVENT HFA) 220 MCG/ACT  Inhaler, Inhale 2 puffs into the lungs 2 times daily, Disp: , Rfl:      KLOR-CON 20 MEQ CR tablet, TAKE 1 TABLET (20 MEQ) BY MOUTH DAILY, Disp: 90 tablet, Rfl: 1     levothyroxine (SYNTHROID/LEVOTHROID) 100 MCG tablet, Take 100 mcg by mouth daily , Disp: , Rfl:      magnesium oxide (MAG-OX) 400 MG tablet, Take 1 tablet (400 mg) by mouth 2 times daily, Disp: 180 tablet, Rfl: 3     melatonin 3 MG tablet, Take 2 tablets (6 mg) by mouth At Bedtime (Patient taking differently: Take 5 mg by mouth At Bedtime ), Disp: , Rfl:      mesalamine (ASACOL HD) 800 MG EC tablet, Take 1 tablet (800 mg) by mouth 3 times daily, Disp: 270 tablet, Rfl: 1     multivitamin w/minerals (THERA-VIT-M) tablet, Take 1 tablet by mouth daily, Disp: , Rfl:      mycophenolate (GENERIC EQUIVALENT) 250 MG capsule, Take 6 capsules (1,500 mg) by mouth 2 times daily, Disp: 360 capsule, Rfl: 11     omeprazole (PRILOSEC) 40 MG DR capsule, Take 1 capsule (40 mg) by mouth daily, Disp: , Rfl:      rosuvastatin (CRESTOR) 10 MG tablet, Take 1 tablet (10 mg) by mouth daily, Disp: 90 tablet, Rfl: 1     tacrolimus (GENERIC EQUIVALENT) 0.5 MG capsule, Pt currently not taking., Disp: 90 capsule, Rfl: 3     tacrolimus (GENERIC EQUIVALENT) 1 MG capsule, Take 4 capsules (4 mg) by mouth 2 times daily, Disp: 720 capsule, Rfl: 3     acetaminophen (TYLENOL) 325 MG tablet, Take 2 tablets (650 mg) by mouth every 4 hours as needed for mild pain (Patient not taking: Reported on 9/17/2019), Disp: , Rfl:      albuterol (PROAIR HFA/PROVENTIL HFA/VENTOLIN HFA) 108 (90 Base) MCG/ACT inhaler, Inhale 2 puffs into the lungs every 6 hours as needed for shortness of breath / dyspnea or wheezing, Disp: , Rfl:      fluticasone (FLONASE) 50 MCG/ACT nasal spray, Spray 1 spray into both nostrils 2 times daily as needed , Disp: , Rfl:      montelukast (SINGULAIR) 10 MG tablet, Take 1 tablet (10 mg) by mouth At Bedtime, Disp: , Rfl:      ondansetron (ZOFRAN-ODT) 4 MG ODT tab, DISSOLVE 1 TABLET  ON THE TONGUE EVERY EIGHT HOURS AS NEEDED, Disp: , Rfl: 3     polyethylene glycol (MIRALAX/GLYCOLAX) powder, Take 1 capful by mouth daily, Disp: , Rfl:      senna (SENOKOT) 8.6 MG tablet, Take 2 tablets by mouth 2 times daily as needed for constipation, Disp: , Rfl:      valGANciclovir (VALCYTE) 450 MG tablet, Take 1 tablet (450 mg) by mouth daily, Disp: 28 tablet, Rfl: 1    SOCIAL HISTORY:  Social History     Socioeconomic History     Marital status: Single     Spouse name: Not on file     Number of children: Not on file     Years of education: Not on file     Highest education level: Not on file   Occupational History     Not on file   Social Needs     Financial resource strain: Not on file     Food insecurity:     Worry: Not on file     Inability: Not on file     Transportation needs:     Medical: Not on file     Non-medical: Not on file   Tobacco Use     Smoking status: Former Smoker     Years: 10.00     Start date: 1980     Last attempt to quit: 2008     Years since quittin.7     Smokeless tobacco: Never Used   Substance and Sexual Activity     Alcohol use: Not Currently     Alcohol/week: 0.6 oz     Types: 1 Cans of beer per week     Comment: a couple times a week      Drug use: No     Sexual activity: Not on file   Lifestyle     Physical activity:     Days per week: Not on file     Minutes per session: Not on file     Stress: Not on file   Relationships     Social connections:     Talks on phone: Not on file     Gets together: Not on file     Attends Alevism service: Not on file     Active member of club or organization: Not on file     Attends meetings of clubs or organizations: Not on file     Relationship status: Not on file     Intimate partner violence:     Fear of current or ex partner: Not on file     Emotionally abused: Not on file     Physically abused: Not on file     Forced sexual activity: Not on file   Other Topics Concern     Not on file   Social History Narrative    Everton is a  "retired . He lives by himself in a townhouse in [place]. He has no lawn care responsibilities. He will be staying with his folks during his post-hospital early transplant care time. No history of TB exposures.        FAMILY HISTORY:  Family History   Problem Relation Age of Onset     Endometrial Cancer Mother      Hypertension Father      Endometrial Cancer Maternal Grandmother        Past/family/social history reviewed and no changes    PHYSICAL EXAMINATION:  Constitutional: aaox3, cooperative, pleasant, not dyspneic/diaphoretic, no acute distress  Vitals reviewed: /75 (BP Location: Left arm, Patient Position: Sitting, Cuff Size: Adult Regular)   Pulse 99   Temp 98.5  F (36.9  C) (Oral)   Resp 20   Ht 1.725 m (5' 7.91\")   Wt 56.3 kg (124 lb 3.2 oz)   SpO2 100%   BMI 18.93 kg/m     Wt:   Wt Readings from Last 2 Encounters:   09/17/19 56.2 kg (123 lb 14.4 oz)   09/17/19 56.3 kg (124 lb 3.2 oz)      Constitutional: cooperative, pleasant, not dyspneic/diaphoretic, no acute distress  Eyes: Sclera anicteric/injected  Ears/nose/mouth/throat: Normal oropharynx without ulcers or exudate, mucus membranes moist, hearing intact  CV: No edema  Respiratory: Unlabored breathing  Abd: Nondistended, +bs, no hepatosplenomegaly, nontender, no peritoneal signs  Skin: warm, perfused, no jaundice  Neuro: AAO x 3  Psych: Normal affect  MSK: No gross deformities    PERTINENT STUDIES:  Most recent CBC:  Recent Labs   Lab Test 09/17/19  1702 09/17/19  0856   WBC 3.7* 3.5*   HGB 10.1* 9.5*   HCT 32.8* 28.5*    279     Most recent hepatic panel:  Recent Labs   Lab Test 09/17/19  0856 07/24/19  1013   ALT 23 26   AST 16 25     Most recent creatinine:  Recent Labs   Lab Test 09/17/19  0856 08/13/19  1200   CR 2.02* 1.26*       Hb 9.5  WBC 3.5  Plts 279    Imaging:  DEXA (4/30/18)  Assessment:    1. The spine bone density L2-L4(L3) with T-score -2.6.  2. Femoral bone densities show left femoral neck T- score " -2.4 and right   femoral neck T-score -2.1.  3. Trabecular bone score indicates poor trabecular bone architecture.    I performed a history and physical examination of the above patient and discussed the management with Dr. Jensen on 9/17/2019. I reviewed the note and there are no changes to the past medical, family or social history.  A complete 10 point review of systems was obtained. Please see the HPI for pertinent positives and negatives. All other systems were reviewed and were found to be negative.     I agree with the documented findings and plan of care as outlined.    Genevieve Allen MD  GI Attending  Pager: [phone]

## 2019-09-17 NOTE — TELEPHONE ENCOUNTER
Received order to schedule patient with Dr. Allen or Dr. Markham under MAC anesthesia at [facility] or [facility].  Due to patient's recent heart transplant, patient will need to be scheduled at [address].  Replied back to original sender (Jocelynn Jensen) informing her of this, and requested potential dates/times to schedule patient on a Tuesday or Thursday.

## 2019-09-17 NOTE — NURSING NOTE
Chief Complaint   Patient presents with     RECHECK     Pseudomonas Aeruginosa       /75   Pulse 99   Temp 98.5  F (36.9  C) (Oral)   Wt 56.2 kg (123 lb 14.4 oz)   SpO2 100%   BMI 18.89 kg/m        Desiree Osuna CMA CMA    9/17/2019 3:41 PM

## 2019-09-18 DIAGNOSIS — B25.9 CYTOMEGALOVIRUS (CMV) VIREMIA (H): Primary | ICD-10-CM

## 2019-09-18 LAB
GGT SERPL-CCNC: 130 U/L (ref 0–75)
IGA SERPL-MCNC: 121 MG/DL (ref 70–380)
TTG IGA SER-ACNC: 1 U/ML
TTG IGG SER-ACNC: <1 U/ML

## 2019-09-18 RX ORDER — VALGANCICLOVIR 450 MG/1
450 TABLET, FILM COATED ORAL DAILY
Qty: 28 TABLET | Refills: 1 | Status: ON HOLD | OUTPATIENT
Start: 2019-09-18 | End: 2019-09-28

## 2019-09-18 NOTE — PROGRESS NOTES
I called the patient regarding the need to start valcyte given CMV viremia.   I think there is a possibility that the diarrhea is due to CMV colitis though UC can not be excluded.     I explained to the patient that he needs to start valcyte and have CMV PCR, CBC with diff, and CMP done on a weekly basis.   I also recommended anti-diarrhea agent.     The patient verbalized understanding. I also sent a Meijob message in the regards.     Ric Shukla MD

## 2019-09-19 ENCOUNTER — TELEPHONE (OUTPATIENT)
Dept: TRANSPLANT | Facility: CLINIC | Age: 54
End: 2019-09-19

## 2019-09-19 ENCOUNTER — TELEPHONE (OUTPATIENT)
Dept: GASTROENTEROLOGY | Facility: CLINIC | Age: 54
End: 2019-09-19

## 2019-09-19 DIAGNOSIS — R79.89 ABNORMAL LFTS (LIVER FUNCTION TESTS): ICD-10-CM

## 2019-09-19 DIAGNOSIS — R19.7 DIARRHEA: ICD-10-CM

## 2019-09-19 DIAGNOSIS — K51.90 ULCERATIVE COLITIS (H): Primary | Chronic | ICD-10-CM

## 2019-09-19 LAB
EBV DNA # SPEC NAA+PROBE: NORMAL {COPIES}/ML
EBV DNA SPEC NAA+PROBE-LOG#: NORMAL {LOG_COPIES}/ML

## 2019-09-19 NOTE — TELEPHONE ENCOUNTER
----- Message from Jocelynn Jensen MD sent at 9/19/2019  1:56 PM CDT -----  Roxana,    Could you help order and schedule an MRCP for Everton? His alk phos is rising and should be evaluated further. I called him yesterday and will call him again today about the study (in addition to staying well hydrated with his diarrhea).    Best,  Jocelynn

## 2019-09-19 NOTE — TELEPHONE ENCOUNTER
Orders placed for patient to have stool samples performed and for an MRCP.  Patient will be updated per Dr Jensen on plan of care going forward.

## 2019-09-20 ENCOUNTER — TELEPHONE (OUTPATIENT)
Dept: CARE COORDINATION | Facility: CLINIC | Age: 54
End: 2019-09-20

## 2019-09-20 ENCOUNTER — OFFICE VISIT - HEALTHEAST (OUTPATIENT)
Dept: INTERNAL MEDICINE | Facility: CLINIC | Age: 54
End: 2019-09-20

## 2019-09-20 ENCOUNTER — TELEPHONE (OUTPATIENT)
Dept: GASTROENTEROLOGY | Facility: CLINIC | Age: 54
End: 2019-09-20

## 2019-09-20 DIAGNOSIS — M79.10 MYALGIA: ICD-10-CM

## 2019-09-20 DIAGNOSIS — K51.90 ULCERATIVE COLITIS (H): Chronic | ICD-10-CM

## 2019-09-20 DIAGNOSIS — F43.21 ADJUSTMENT DISORDER WITH DEPRESSED MOOD: ICD-10-CM

## 2019-09-20 DIAGNOSIS — R19.7 DIARRHEA: ICD-10-CM

## 2019-09-20 DIAGNOSIS — R79.89 ABNORMAL LFTS (LIVER FUNCTION TESTS): ICD-10-CM

## 2019-09-20 DIAGNOSIS — Z23 NEED FOR IMMUNIZATION AGAINST INFLUENZA: ICD-10-CM

## 2019-09-20 LAB
C DIFF TOX B STL QL: POSITIVE
SPECIMEN SOURCE: ABNORMAL

## 2019-09-20 PROCEDURE — 87209 SMEAR COMPLEX STAIN: CPT | Performed by: INTERNAL MEDICINE

## 2019-09-20 PROCEDURE — 87506 IADNA-DNA/RNA PROBE TQ 6-11: CPT | Mod: 59 | Performed by: INTERNAL MEDICINE

## 2019-09-20 PROCEDURE — 87329 GIARDIA AG IA: CPT | Performed by: INTERNAL MEDICINE

## 2019-09-20 PROCEDURE — 87493 C DIFF AMPLIFIED PROBE: CPT | Performed by: INTERNAL MEDICINE

## 2019-09-20 PROCEDURE — 87177 OVA AND PARASITES SMEARS: CPT | Performed by: INTERNAL MEDICINE

## 2019-09-20 PROCEDURE — 36415 COLL VENOUS BLD VENIPUNCTURE: CPT | Performed by: INTERNAL MEDICINE

## 2019-09-20 PROCEDURE — 87328 CRYPTOSPORIDIUM AG IA: CPT | Performed by: INTERNAL MEDICINE

## 2019-09-20 ASSESSMENT — PATIENT HEALTH QUESTIONNAIRE - PHQ9: SUM OF ALL RESPONSES TO PHQ QUESTIONS 1-9: 18

## 2019-09-20 NOTE — TELEPHONE ENCOUNTER
Transplant Social Work Services Phone Call       Data: Transplant  received a donor letter, from the [employer], for pt. Writer reviewed letter and contacted pt to inform him letter was received.     Intervention: Supportive Phone Call  Assessment: Pt reports he has not been feeling well. Pt surprised that the donor family had written him a letter. Helped pt process this and after some discussion pt wants the letter, but not at this time. Provided reassurance that this is ok and that writer will hold on to the letter until he is ready. Pt informed writer that he will contact writer when he wants the letter.   Education provided by SW: Normalized feelings and emotions around receiving donor letter.   Plan:    Pt is aware he has received a donor letter and at this time wants to wait, for writer to send it to him. Pt will contact writer when he is ready to receive the letter.

## 2019-09-22 ENCOUNTER — COMMUNICATION - HEALTHEAST (OUTPATIENT)
Dept: INTERNAL MEDICINE | Facility: CLINIC | Age: 54
End: 2019-09-22

## 2019-09-22 DIAGNOSIS — F34.1 DYSTHYMIA: ICD-10-CM

## 2019-09-23 ENCOUNTER — TELEPHONE (OUTPATIENT)
Dept: GASTROENTEROLOGY | Facility: CLINIC | Age: 54
End: 2019-09-23

## 2019-09-23 ENCOUNTER — TELEPHONE (OUTPATIENT)
Dept: TRANSPLANT | Facility: CLINIC | Age: 54
End: 2019-09-23

## 2019-09-23 ENCOUNTER — HOSPITAL ENCOUNTER (INPATIENT)
Facility: CLINIC | Age: 54
LOS: 5 days | Discharge: HOME | DRG: 372 | End: 2019-09-28
Attending: FAMILY MEDICINE | Admitting: INTERNAL MEDICINE
Payer: COMMERCIAL

## 2019-09-23 ENCOUNTER — RECORDS - HEALTHEAST (OUTPATIENT)
Dept: ADMINISTRATIVE | Facility: OTHER | Age: 54
End: 2019-09-23

## 2019-09-23 ENCOUNTER — APPOINTMENT (OUTPATIENT)
Dept: CT IMAGING | Facility: CLINIC | Age: 54
DRG: 372 | End: 2019-09-23
Attending: FAMILY MEDICINE
Payer: COMMERCIAL

## 2019-09-23 DIAGNOSIS — A04.72 C. DIFFICILE COLITIS: ICD-10-CM

## 2019-09-23 DIAGNOSIS — A04.72 C. DIFFICILE DIARRHEA: Primary | ICD-10-CM

## 2019-09-23 DIAGNOSIS — R19.7 NAUSEA VOMITING AND DIARRHEA: ICD-10-CM

## 2019-09-23 DIAGNOSIS — Z94.1 HEART REPLACED BY TRANSPLANT (H): ICD-10-CM

## 2019-09-23 DIAGNOSIS — R11.2 NAUSEA VOMITING AND DIARRHEA: ICD-10-CM

## 2019-09-23 DIAGNOSIS — B25.9 CYTOMEGALOVIRUS INFECTION, UNSPECIFIED CYTOMEGALOVIRAL INFECTION TYPE (H): ICD-10-CM

## 2019-09-23 DIAGNOSIS — E86.0 DEHYDRATION: ICD-10-CM

## 2019-09-23 DIAGNOSIS — N17.9 AKI (ACUTE KIDNEY INJURY) (H): ICD-10-CM

## 2019-09-23 DIAGNOSIS — Z94.1 TRANSPLANTED HEART (H): ICD-10-CM

## 2019-09-23 DIAGNOSIS — Z94.1 TRANSPLANTED HEART (H): Primary | ICD-10-CM

## 2019-09-23 DIAGNOSIS — A04.72 C. DIFFICILE COLITIS: Primary | ICD-10-CM

## 2019-09-23 LAB
ALBUMIN SERPL-MCNC: 4 G/DL (ref 3.4–5)
ALP SERPL-CCNC: 301 U/L (ref 40–150)
ALT SERPL W P-5'-P-CCNC: 17 U/L (ref 0–70)
ANION GAP SERPL CALCULATED.3IONS-SCNC: 11 MMOL/L (ref 3–14)
APTT PPP: 35 SEC (ref 22–37)
AST SERPL W P-5'-P-CCNC: 16 U/L (ref 0–45)
BASOPHILS # BLD AUTO: 0 10E9/L (ref 0–0.2)
BASOPHILS NFR BLD AUTO: 0.9 %
BILIRUB SERPL-MCNC: 0.4 MG/DL (ref 0.2–1.3)
BUN SERPL-MCNC: 47 MG/DL (ref 7–30)
C PARVUM AG STL QL IA: NEGATIVE
CALCIUM SERPL-MCNC: 9 MG/DL (ref 8.5–10.1)
CHLORIDE SERPL-SCNC: 108 MMOL/L (ref 94–109)
CO2 SERPL-SCNC: 15 MMOL/L (ref 20–32)
CREAT SERPL-MCNC: 2.41 MG/DL (ref 0.66–1.25)
CRP SERPL-MCNC: 3.8 MG/L (ref 0–8)
DIFFERENTIAL METHOD BLD: ABNORMAL
EOSINOPHIL # BLD AUTO: 0.3 10E9/L (ref 0–0.7)
EOSINOPHIL NFR BLD AUTO: 6.3 %
ERYTHROCYTE [DISTWIDTH] IN BLOOD BY AUTOMATED COUNT: 13.9 % (ref 10–15)
ERYTHROCYTE [SEDIMENTATION RATE] IN BLOOD BY WESTERGREN METHOD: 17 MM/H (ref 0–20)
G LAMBLIA AG STL QL IA: NEGATIVE
GFR SERPL CREATININE-BSD FRML MDRD: 29 ML/MIN/{1.73_M2}
GLUCOSE SERPL-MCNC: 96 MG/DL (ref 70–99)
HCT VFR BLD AUTO: 32.7 % (ref 40–53)
HGB BLD-MCNC: 10.2 G/DL (ref 13.3–17.7)
IMM GRANULOCYTES # BLD: 0.1 10E9/L (ref 0–0.4)
IMM GRANULOCYTES NFR BLD: 1.1 %
INR PPP: 1.17 (ref 0.86–1.14)
LACTATE BLD-SCNC: 1.2 MMOL/L (ref 0.7–2)
LIPASE SERPL-CCNC: 151 U/L (ref 73–393)
LYMPHOCYTES # BLD AUTO: 1 10E9/L (ref 0.8–5.3)
LYMPHOCYTES NFR BLD AUTO: 21.6 %
MAGNESIUM SERPL-MCNC: 2 MG/DL (ref 1.6–2.3)
MCH RBC QN AUTO: 25.7 PG (ref 26.5–33)
MCHC RBC AUTO-ENTMCNC: 31.2 G/DL (ref 31.5–36.5)
MCV RBC AUTO: 82 FL (ref 78–100)
MONOCYTES # BLD AUTO: 0.4 10E9/L (ref 0–1.3)
MONOCYTES NFR BLD AUTO: 8.4 %
NEUTROPHILS # BLD AUTO: 2.9 10E9/L (ref 1.6–8.3)
NEUTROPHILS NFR BLD AUTO: 61.7 %
NRBC # BLD AUTO: 0 10*3/UL
NRBC BLD AUTO-RTO: 0 /100
NT-PROBNP SERPL-MCNC: 767 PG/ML (ref 0–900)
O+P STL MICRO: NORMAL
O+P STL MICRO: NORMAL
PHOSPHATE SERPL-MCNC: 4.8 MG/DL (ref 2.5–4.5)
PLATELET # BLD AUTO: 242 10E9/L (ref 150–450)
PLATELET # BLD AUTO: 290 10E9/L (ref 150–450)
POTASSIUM SERPL-SCNC: 4.6 MMOL/L (ref 3.4–5.3)
PROT SERPL-MCNC: 7.4 G/DL (ref 6.8–8.8)
RBC # BLD AUTO: 3.97 10E12/L (ref 4.4–5.9)
SODIUM SERPL-SCNC: 134 MMOL/L (ref 133–144)
SPECIMEN SOURCE: NORMAL
SPECIMEN SOURCE: NORMAL
TROPONIN I SERPL-MCNC: <0.015 UG/L (ref 0–0.04)
WBC # BLD AUTO: 4.6 10E9/L (ref 4–11)

## 2019-09-23 PROCEDURE — 85652 RBC SED RATE AUTOMATED: CPT | Performed by: FAMILY MEDICINE

## 2019-09-23 PROCEDURE — 96376 TX/PRO/DX INJ SAME DRUG ADON: CPT | Performed by: FAMILY MEDICINE

## 2019-09-23 PROCEDURE — 25000128 H RX IP 250 OP 636: Performed by: FAMILY MEDICINE

## 2019-09-23 PROCEDURE — 84100 ASSAY OF PHOSPHORUS: CPT | Performed by: FAMILY MEDICINE

## 2019-09-23 PROCEDURE — 83690 ASSAY OF LIPASE: CPT | Performed by: FAMILY MEDICINE

## 2019-09-23 PROCEDURE — 99284 EMERGENCY DEPT VISIT MOD MDM: CPT | Mod: 25 | Performed by: FAMILY MEDICINE

## 2019-09-23 PROCEDURE — 74176 CT ABD & PELVIS W/O CONTRAST: CPT

## 2019-09-23 PROCEDURE — 86140 C-REACTIVE PROTEIN: CPT | Performed by: NURSE PRACTITIONER

## 2019-09-23 PROCEDURE — 85730 THROMBOPLASTIN TIME PARTIAL: CPT | Performed by: FAMILY MEDICINE

## 2019-09-23 PROCEDURE — 93010 ELECTROCARDIOGRAM REPORT: CPT | Mod: Z6 | Performed by: FAMILY MEDICINE

## 2019-09-23 PROCEDURE — 87040 BLOOD CULTURE FOR BACTERIA: CPT | Performed by: FAMILY MEDICINE

## 2019-09-23 PROCEDURE — 85025 COMPLETE CBC W/AUTO DIFF WBC: CPT | Performed by: FAMILY MEDICINE

## 2019-09-23 PROCEDURE — 85610 PROTHROMBIN TIME: CPT | Performed by: FAMILY MEDICINE

## 2019-09-23 PROCEDURE — 93010 ELECTROCARDIOGRAM REPORT: CPT | Performed by: INTERNAL MEDICINE

## 2019-09-23 PROCEDURE — 83735 ASSAY OF MAGNESIUM: CPT | Performed by: FAMILY MEDICINE

## 2019-09-23 PROCEDURE — 83880 ASSAY OF NATRIURETIC PEPTIDE: CPT | Performed by: FAMILY MEDICINE

## 2019-09-23 PROCEDURE — 84484 ASSAY OF TROPONIN QUANT: CPT | Performed by: FAMILY MEDICINE

## 2019-09-23 PROCEDURE — 99223 1ST HOSP IP/OBS HIGH 75: CPT | Mod: GC | Performed by: INTERNAL MEDICINE

## 2019-09-23 PROCEDURE — 12000012 ZZH R&B MS OVERFLOW UMMC

## 2019-09-23 PROCEDURE — 25000132 ZZH RX MED GY IP 250 OP 250 PS 637: Performed by: STUDENT IN AN ORGANIZED HEALTH CARE EDUCATION/TRAINING PROGRAM

## 2019-09-23 PROCEDURE — 96375 TX/PRO/DX INJ NEW DRUG ADDON: CPT | Performed by: FAMILY MEDICINE

## 2019-09-23 PROCEDURE — 80053 COMPREHEN METABOLIC PANEL: CPT | Performed by: FAMILY MEDICINE

## 2019-09-23 PROCEDURE — 93005 ELECTROCARDIOGRAM TRACING: CPT | Performed by: FAMILY MEDICINE

## 2019-09-23 PROCEDURE — 12000004 ZZH R&B IMCU UMMC

## 2019-09-23 PROCEDURE — 99285 EMERGENCY DEPT VISIT HI MDM: CPT | Mod: 25 | Performed by: FAMILY MEDICINE

## 2019-09-23 PROCEDURE — 96374 THER/PROPH/DIAG INJ IV PUSH: CPT | Performed by: FAMILY MEDICINE

## 2019-09-23 PROCEDURE — 36415 COLL VENOUS BLD VENIPUNCTURE: CPT | Performed by: STUDENT IN AN ORGANIZED HEALTH CARE EDUCATION/TRAINING PROGRAM

## 2019-09-23 PROCEDURE — 86140 C-REACTIVE PROTEIN: CPT | Performed by: FAMILY MEDICINE

## 2019-09-23 PROCEDURE — 25000132 ZZH RX MED GY IP 250 OP 250 PS 637: Performed by: NURSE PRACTITIONER

## 2019-09-23 PROCEDURE — 25800030 ZZH RX IP 258 OP 636: Performed by: FAMILY MEDICINE

## 2019-09-23 PROCEDURE — 85049 AUTOMATED PLATELET COUNT: CPT | Performed by: STUDENT IN AN ORGANIZED HEALTH CARE EDUCATION/TRAINING PROGRAM

## 2019-09-23 PROCEDURE — 25000128 H RX IP 250 OP 636: Performed by: STUDENT IN AN ORGANIZED HEALTH CARE EDUCATION/TRAINING PROGRAM

## 2019-09-23 PROCEDURE — 25000131 ZZH RX MED GY IP 250 OP 636 PS 637: Performed by: STUDENT IN AN ORGANIZED HEALTH CARE EDUCATION/TRAINING PROGRAM

## 2019-09-23 PROCEDURE — 83605 ASSAY OF LACTIC ACID: CPT | Performed by: FAMILY MEDICINE

## 2019-09-23 PROCEDURE — 96361 HYDRATE IV INFUSION ADD-ON: CPT | Performed by: FAMILY MEDICINE

## 2019-09-23 PROCEDURE — 93005 ELECTROCARDIOGRAM TRACING: CPT

## 2019-09-23 RX ORDER — ONDANSETRON 2 MG/ML
4 INJECTION INTRAMUSCULAR; INTRAVENOUS EVERY 30 MIN PRN
Status: DISCONTINUED | OUTPATIENT
Start: 2019-09-23 | End: 2019-09-23

## 2019-09-23 RX ORDER — ASPIRIN 81 MG/1
324 TABLET, CHEWABLE ORAL ONCE
Status: DISCONTINUED | OUTPATIENT
Start: 2019-09-23 | End: 2019-09-23

## 2019-09-23 RX ORDER — HEPARIN SODIUM 5000 [USP'U]/.5ML
5000 INJECTION, SOLUTION INTRAVENOUS; SUBCUTANEOUS EVERY 12 HOURS
Status: DISCONTINUED | OUTPATIENT
Start: 2019-09-23 | End: 2019-09-26

## 2019-09-23 RX ORDER — VANCOMYCIN HYDROCHLORIDE 125 MG/1
125 CAPSULE ORAL 4 TIMES DAILY
Qty: 40 CAPSULE | Refills: 0 | Status: ON HOLD | OUTPATIENT
Start: 2019-09-23 | End: 2019-09-28

## 2019-09-23 RX ORDER — ACETAMINOPHEN 325 MG/1
650 TABLET ORAL EVERY 4 HOURS PRN
Status: DISCONTINUED | OUTPATIENT
Start: 2019-09-23 | End: 2019-09-23

## 2019-09-23 RX ORDER — LIDOCAINE 40 MG/G
CREAM TOPICAL
Status: DISCONTINUED | OUTPATIENT
Start: 2019-09-23 | End: 2019-09-23

## 2019-09-23 RX ORDER — SODIUM CHLORIDE 9 MG/ML
1000 INJECTION, SOLUTION INTRAVENOUS CONTINUOUS
Status: DISCONTINUED | OUTPATIENT
Start: 2019-09-23 | End: 2019-09-23

## 2019-09-23 RX ORDER — FLUTICASONE PROPIONATE 50 MCG
1 SPRAY, SUSPENSION (ML) NASAL 2 TIMES DAILY PRN
Status: DISCONTINUED | OUTPATIENT
Start: 2019-09-23 | End: 2019-09-28 | Stop reason: HOSPADM

## 2019-09-23 RX ORDER — MYCOPHENOLATE MOFETIL 250 MG/1
1500 CAPSULE ORAL 2 TIMES DAILY
Status: DISCONTINUED | OUTPATIENT
Start: 2019-09-23 | End: 2019-09-25

## 2019-09-23 RX ORDER — ONDANSETRON 2 MG/ML
4 INJECTION INTRAMUSCULAR; INTRAVENOUS EVERY 6 HOURS PRN
Status: DISCONTINUED | OUTPATIENT
Start: 2019-09-23 | End: 2019-09-28 | Stop reason: HOSPADM

## 2019-09-23 RX ORDER — MESALAMINE 800 MG/1
800 TABLET, DELAYED RELEASE ORAL 3 TIMES DAILY
Status: DISCONTINUED | OUTPATIENT
Start: 2019-09-23 | End: 2019-09-28 | Stop reason: HOSPADM

## 2019-09-23 RX ORDER — VANCOMYCIN HYDROCHLORIDE 50 MG/ML
125 KIT ORAL 4 TIMES DAILY
Status: DISCONTINUED | OUTPATIENT
Start: 2019-09-23 | End: 2019-09-28 | Stop reason: HOSPADM

## 2019-09-23 RX ORDER — ONDANSETRON 4 MG/1
4 TABLET, ORALLY DISINTEGRATING ORAL EVERY 8 HOURS PRN
Status: DISCONTINUED | OUTPATIENT
Start: 2019-09-23 | End: 2019-09-28 | Stop reason: HOSPADM

## 2019-09-23 RX ORDER — ALUMINA, MAGNESIA, AND SIMETHICONE 2400; 2400; 240 MG/30ML; MG/30ML; MG/30ML
30 SUSPENSION ORAL EVERY 4 HOURS PRN
Status: DISCONTINUED | OUTPATIENT
Start: 2019-09-23 | End: 2019-09-23

## 2019-09-23 RX ORDER — FENTANYL CITRATE 50 UG/ML
25 INJECTION, SOLUTION INTRAMUSCULAR; INTRAVENOUS
Status: COMPLETED | OUTPATIENT
Start: 2019-09-23 | End: 2019-09-23

## 2019-09-23 RX ORDER — TACROLIMUS 1 MG/1
4 CAPSULE ORAL 2 TIMES DAILY
Status: DISCONTINUED | OUTPATIENT
Start: 2019-09-23 | End: 2019-09-23

## 2019-09-23 RX ORDER — POTASSIUM CHLORIDE 750 MG/1
20 TABLET, EXTENDED RELEASE ORAL
Status: DISCONTINUED | OUTPATIENT
Start: 2019-09-23 | End: 2019-09-23

## 2019-09-23 RX ORDER — ACETAMINOPHEN 325 MG/1
650 TABLET ORAL EVERY 4 HOURS PRN
Status: DISCONTINUED | OUTPATIENT
Start: 2019-09-23 | End: 2019-09-28 | Stop reason: HOSPADM

## 2019-09-23 RX ORDER — POTASSIUM CHLORIDE 750 MG/1
40 TABLET, EXTENDED RELEASE ORAL
Status: DISCONTINUED | OUTPATIENT
Start: 2019-09-23 | End: 2019-09-23

## 2019-09-23 RX ORDER — NITROGLYCERIN 0.4 MG/1
0.4 TABLET SUBLINGUAL EVERY 5 MIN PRN
Status: DISCONTINUED | OUTPATIENT
Start: 2019-09-23 | End: 2019-09-28 | Stop reason: HOSPADM

## 2019-09-23 RX ORDER — VANCOMYCIN HYDROCHLORIDE 50 MG/ML
125 KIT ORAL 4 TIMES DAILY
Status: DISCONTINUED | OUTPATIENT
Start: 2019-09-23 | End: 2019-09-23

## 2019-09-23 RX ORDER — NALOXONE HYDROCHLORIDE 0.4 MG/ML
.1-.4 INJECTION, SOLUTION INTRAMUSCULAR; INTRAVENOUS; SUBCUTANEOUS
Status: DISCONTINUED | OUTPATIENT
Start: 2019-09-23 | End: 2019-09-28 | Stop reason: HOSPADM

## 2019-09-23 RX ORDER — POTASSIUM CHLORIDE 750 MG/1
20 TABLET, EXTENDED RELEASE ORAL DAILY
Status: DISCONTINUED | OUTPATIENT
Start: 2019-09-24 | End: 2019-09-23

## 2019-09-23 RX ORDER — ALBUTEROL SULFATE 90 UG/1
2 AEROSOL, METERED RESPIRATORY (INHALATION) EVERY 6 HOURS PRN
Status: DISCONTINUED | OUTPATIENT
Start: 2019-09-23 | End: 2019-09-28 | Stop reason: HOSPADM

## 2019-09-23 RX ORDER — VALGANCICLOVIR 450 MG/1
450 TABLET, FILM COATED ORAL DAILY
Status: DISCONTINUED | OUTPATIENT
Start: 2019-09-24 | End: 2019-09-26

## 2019-09-23 RX ORDER — SODIUM CHLORIDE 9 MG/ML
INJECTION, SOLUTION INTRAVENOUS CONTINUOUS
Status: DISCONTINUED | OUTPATIENT
Start: 2019-09-23 | End: 2019-09-23

## 2019-09-23 RX ORDER — AMLODIPINE BESYLATE 5 MG/1
5 TABLET ORAL DAILY
Status: DISCONTINUED | OUTPATIENT
Start: 2019-09-24 | End: 2019-09-28 | Stop reason: HOSPADM

## 2019-09-23 RX ORDER — DULOXETIN HYDROCHLORIDE 20 MG/1
20 CAPSULE, DELAYED RELEASE ORAL DAILY
Status: DISCONTINUED | OUTPATIENT
Start: 2019-09-24 | End: 2019-09-28 | Stop reason: HOSPADM

## 2019-09-23 RX ORDER — ASPIRIN 81 MG/1
81 TABLET, CHEWABLE ORAL DAILY
Status: DISCONTINUED | OUTPATIENT
Start: 2019-09-24 | End: 2019-09-28 | Stop reason: HOSPADM

## 2019-09-23 RX ORDER — LIDOCAINE 40 MG/G
CREAM TOPICAL
Status: DISCONTINUED | OUTPATIENT
Start: 2019-09-23 | End: 2019-09-28 | Stop reason: HOSPADM

## 2019-09-23 RX ORDER — VANCOMYCIN HYDROCHLORIDE 125 MG/1
125 CAPSULE ORAL 4 TIMES DAILY
Status: DISCONTINUED | OUTPATIENT
Start: 2019-09-23 | End: 2019-09-23

## 2019-09-23 RX ORDER — ACETAMINOPHEN 650 MG/1
650 SUPPOSITORY RECTAL EVERY 4 HOURS PRN
Status: DISCONTINUED | OUTPATIENT
Start: 2019-09-23 | End: 2019-09-28 | Stop reason: HOSPADM

## 2019-09-23 RX ORDER — LEVOTHYROXINE SODIUM 100 UG/1
100 TABLET ORAL DAILY
Status: DISCONTINUED | OUTPATIENT
Start: 2019-09-24 | End: 2019-09-28 | Stop reason: HOSPADM

## 2019-09-23 RX ORDER — FENTANYL CITRATE 50 UG/ML
25 INJECTION, SOLUTION INTRAMUSCULAR; INTRAVENOUS
Status: DISCONTINUED | OUTPATIENT
Start: 2019-09-23 | End: 2019-09-23

## 2019-09-23 RX ORDER — LANOLIN ALCOHOL/MO/W.PET/CERES
6 CREAM (GRAM) TOPICAL AT BEDTIME
Status: DISCONTINUED | OUTPATIENT
Start: 2019-09-23 | End: 2019-09-28 | Stop reason: HOSPADM

## 2019-09-23 RX ORDER — ROSUVASTATIN CALCIUM 10 MG/1
10 TABLET, COATED ORAL DAILY
Status: DISCONTINUED | OUTPATIENT
Start: 2019-09-24 | End: 2019-09-28 | Stop reason: HOSPADM

## 2019-09-23 RX ORDER — MONTELUKAST SODIUM 10 MG/1
10 TABLET ORAL AT BEDTIME
Status: DISCONTINUED | OUTPATIENT
Start: 2019-09-23 | End: 2019-09-28 | Stop reason: HOSPADM

## 2019-09-23 RX ADMIN — FENTANYL CITRATE 25 MCG: 50 INJECTION INTRAMUSCULAR; INTRAVENOUS at 20:07

## 2019-09-23 RX ADMIN — VANCOMYCIN HYDROCHLORIDE 125 MG: KIT at 22:54

## 2019-09-23 RX ADMIN — ONDANSETRON 4 MG: 2 INJECTION INTRAMUSCULAR; INTRAVENOUS at 15:49

## 2019-09-23 RX ADMIN — SODIUM CHLORIDE 1000 ML: 9 INJECTION, SOLUTION INTRAVENOUS at 15:48

## 2019-09-23 RX ADMIN — MONTELUKAST 10 MG: 10 TABLET, FILM COATED ORAL at 22:55

## 2019-09-23 RX ADMIN — FENTANYL CITRATE 25 MCG: 50 INJECTION INTRAMUSCULAR; INTRAVENOUS at 17:53

## 2019-09-23 RX ADMIN — SODIUM CHLORIDE 1000 ML: 9 INJECTION, SOLUTION INTRAVENOUS at 17:40

## 2019-09-23 RX ADMIN — MELATONIN TAB 3 MG 6 MG: 3 TAB at 22:54

## 2019-09-23 RX ADMIN — VANCOMYCIN HYDROCHLORIDE 125 MG: KIT at 17:40

## 2019-09-23 RX ADMIN — MYCOPHENOLATE MOFETIL 1500 MG: 250 CAPSULE ORAL at 22:54

## 2019-09-23 RX ADMIN — FENTANYL CITRATE 25 MCG: 50 INJECTION INTRAMUSCULAR; INTRAVENOUS at 15:50

## 2019-09-23 RX ADMIN — HEPARIN SODIUM 5000 UNITS: 5000 INJECTION, SOLUTION INTRAVENOUS; SUBCUTANEOUS at 22:56

## 2019-09-23 RX ADMIN — MESALAMINE 800 MG: 800 TABLET, DELAYED RELEASE ORAL at 22:55

## 2019-09-23 RX ADMIN — Medication 1 TABLET: at 22:53

## 2019-09-23 RX ADMIN — ONDANSETRON 4 MG: 2 INJECTION INTRAMUSCULAR; INTRAVENOUS at 20:07

## 2019-09-23 ASSESSMENT — ACTIVITIES OF DAILY LIVING (ADL)
TOILETING: 0-->INDEPENDENT
BATHING: 0-->INDEPENDENT
SWALLOWING: 0-->SWALLOWS FOODS/LIQUIDS WITHOUT DIFFICULTY
COGNITION: 0 - NO COGNITION ISSUES REPORTED
RETIRED_COMMUNICATION: 0-->UNDERSTANDS/COMMUNICATES WITHOUT DIFFICULTY
TRANSFERRING: 0-->INDEPENDENT
FALL_HISTORY_WITHIN_LAST_SIX_MONTHS: NO
DRESS: 0-->INDEPENDENT
RETIRED_EATING: 0-->INDEPENDENT
AMBULATION: 0-->INDEPENDENT

## 2019-09-23 ASSESSMENT — MIFFLIN-ST. JEOR
SCORE: 1357.89
SCORE: 1337.5

## 2019-09-23 NOTE — ED NOTES
[facility], [place]   ED Nurse to Floor Handoff     Everton Larios is a 54 year old male who speaks English and lives alone,  in a home  They arrived in the ED by car from home    ED Chief Complaint: Abdominal Pain and Diarrhea    ED Dx;   Final diagnoses:   FRANKLIN (acute kidney injury) (H)   Transplanted heart (H)   C. difficile colitis   Nausea vomiting and diarrhea   Dehydration   Cytomegalovirus infection, unspecified cytomegaloviral infection type (H)         Needed?: No    Allergies:   Allergies   Allergen Reactions     Hydromorphone Other (See Comments)     Significant Delirium     Adhesive Tape Blisters     Tegaderm causes bruises, blisters and extreme irritation.     Lisinopril Other (See Comments)     hypotension     Quinolones Other (See Comments)     Would not rx quinolones until QTc interval improved.      Tobramycin Other (See Comments)     Would not use aminoglycosides as his kidney function is very borderline     Codeine Nausea   .  Past Medical Hx:   Past Medical History:   Diagnosis Date     Alcohol abuse      Pierce's esophagus 10/4/2018     Chronic rhinitis 10/4/2018     Chronic systolic heart failure (H) 10/4/2018     Depression 10/4/2018     Diastolic dysfunction      DJD (degenerative joint disease) - neck 10/4/2018     H/O congenital atrial septal defect (ASD) repair at age 3 10/4/2018     Hypothyroidism due to medication 10/4/2018     ICD, [employer] 2008; gen change 2/2018 10/4/2018     Intermittent asthma without complication 10/4/2018     Nonischemic cardiomyopathy (H) 10/4/2018     On amiodarone therapy 10/4/2018     Paroxysmal atrial fibrillation (H) 10/4/2018     S/P ablation of atrial fibrillation 10/4/2018     Systolic heart failure (H)      Ulcerative colitis (H) 10/4/2018     Ventricular tachycardia (H) 10/4/2018      Baseline Mental status: WDL  Current Mental Status changes: at basesline    Infection present or suspected this  encounter: yes C-diff, CMV  Sepsis suspected: No  Isolation type: Enteric     Activity level - Baseline/Home:  Independent  Activity Level - Current:   Independent    Bariatric equipment needed?: No    In the ED these meds were given:   Medications   lidocaine 1 % 0.1-1 mL (has no administration in time range)   lidocaine (LMX4) cream (has no administration in time range)   sodium chloride (PF) 0.9% PF flush 3 mL (has no administration in time range)   sodium chloride (PF) 0.9% PF flush 3 mL (3 mLs Intracatheter Not Given 9/23/19 1707)   0.9% sodium chloride BOLUS (1,000 mLs Intravenous New Bag 9/23/19 1548)     Followed by   sodium chloride 0.9% infusion (1,000 mLs Intravenous New Bag 9/23/19 1740)   ondansetron (ZOFRAN) injection 4 mg (4 mg Intravenous Given 9/23/19 1549)   vancomycin (FIRVANQ) oral solution 125 mg (125 mg Oral Given 9/23/19 1740)   fentaNYL (PF) (SUBLIMAZE) injection 25 mcg (25 mcg Intravenous Given 9/23/19 1753)   fentaNYL (PF) (SUBLIMAZE) injection 25 mcg (25 mcg Intravenous Given 9/23/19 1550)       Drips running?  Yes    Home pump  No    Current LDAs  Peripheral IV 09/23/19 Left Lower forearm (Active)   Site Assessment WDL 9/23/2019  3:33 PM   Line Status Saline locked 9/23/2019  3:33 PM   Phlebitis Scale 0-->no symptoms 9/23/2019  3:33 PM   Number of days: 0       Wound 03/26/19 Posterior Coccyx Suspected pressure ulcer blanchable erythema (Active)   Number of days: 181       Wound 03/30/19 Left Hand Skin tear (Active)   Number of days: 177       Wound 03/30/19 Right Arm Skin tear (Active)   Number of days: 177       Incision/Surgical Site 02/21/19 Midline Chest (Active)   Number of days: 214       Incision/Surgical Site 02/24/19 Left Chest (Active)   Number of days: 211       Incision/Surgical Site 03/12/19 Right Chest (Active)   Number of days: 195       Labs results:   Labs Ordered and Resulted from Time of ED Arrival Up to the Time of Departure from the ED   CBC WITH PLATELETS  "DIFFERENTIAL - Abnormal; Notable for the following components:       Result Value    RBC Count 3.97 (*)     Hemoglobin 10.2 (*)     Hematocrit 32.7 (*)     MCH 25.7 (*)     MCHC 31.2 (*)     All other components within normal limits   INR - Abnormal; Notable for the following components:    INR 1.17 (*)     All other components within normal limits   COMPREHENSIVE METABOLIC PANEL - Abnormal; Notable for the following components:    Carbon Dioxide 15 (*)     Urea Nitrogen 47 (*)     Creatinine 2.41 (*)     GFR Estimate 29 (*)     GFR Estimate If Black 33 (*)     Alkaline Phosphatase 301 (*)     All other components within normal limits   PARTIAL THROMBOPLASTIN TIME   LIPASE   LACTIC ACID WHOLE BLOOD   TROPONIN I   NT PROBNP INPATIENT   CRP INFLAMMATION   ERYTHROCYTE SEDIMENTATION RATE AUTO   PERIPHERAL IV CATHETER   PULSE OXIMETRY NURSING   BLOOD CULTURE       Imaging Studies: No results found for this or any previous visit (from the past 24 hour(s)).    Recent vital signs:   /87   Pulse 108   Temp 97.7  F (36.5  C) (Oral)   Resp 18   Ht 1.727 m (5' 8\")   Wt 55.3 kg (122 lb)   SpO2 100%   BMI 18.55 kg/m      Dalia Coma Scale Score: 15 (09/23/19 1423)       Cardiac Rhythm: Normal Sinus  Pt needs tele? No  Skin/wound Issues: None    Code Status: Full Code    Pain control: good    Nausea control: good    Abnormal labs/tests/findings requiring intervention: See EPIC    Family present during ED course? No   Family Comments/Social Situation comments: None    Tasks needing completion: None    Neli Bermeo, RN  [phone] [facility] ED      "

## 2019-09-23 NOTE — TELEPHONE ENCOUNTER
Pt called in stating he just doesn't feel good. He has a cough, aches, and fatigue. Pt recently diagnosed with CMV viremia and Cdiff, in which he's being treated for both.  No fevers noted. Message sent to Dr. Shukla for review.     Pt states last weeks tacro level is inaccurate because he took a tacro dose prior. Pt will have labs repeated this Thursday 9/26. 12 hour trough reviewed. Orders in. Weekly labs cmv, cbc, and cmp also in.       Msg sent to epic team to fix glucose monitoring labs that keeping coming up.

## 2019-09-23 NOTE — ED TRIAGE NOTES
Pt presents to ED with c/o abdominal pain, vomiting, and diarrhea. Pt recently diagnosed with CMV and Cdiff. Hasn't started cdiff treatment yet. Here to get treatment and further workup.

## 2019-09-23 NOTE — PROGRESS NOTES
I performed a history and physical examination of the above patient and discussed the management with Dr. Jensen on 9/17/2019. I reviewed the note and there are no changes to the past medical, family or social history.  A complete 10 point review of systems was obtained. Please see the HPI for pertinent positives and negatives. All other systems were reviewed and were found to be negative.     I agree with the documented findings and plan of care as outlined.    Genevieve Allen MD  GI Attending  Pager: [phone]

## 2019-09-23 NOTE — CONSULTS
GASTROENTEROLOGY CONSULTATION      Date of Admission: 9/23/2019       Date of Consult: 9/23/19          Chief Complaint:   We were asked by Saud Eisenberg MD to evaluate this patient with abdominal pain, nausea/vomiting and diarrhea.          ASSESSMENT AND RECOMMENDATIONS:   Assessment:  Everton Larios is a 54 year old male with a history of ulcerative pancolitis diagnosed in 2005 (on Balsalazide), CMV viremia, C. Diff on 9/20/19, ASD s/p repair in childhood, NICM from congenital heart disease s/p OHT on 2/24/19, A. fib s/p ablation, immunosuppressed state (on tacrolimus, MMF and prednisone), Luisito's esophagus w/o dysplasia, asthma, osteoporosis, depression, and now being admitted for abdominal pain, nausea/vomiting and diarrhea.     #C. Difficile  #Acute on chronic diarrhea   #Nausea and vomiting   #Abdominal pain  Patient had L-sided/RLQ abdominal pian, nausea/vomiting and diarrhea since mid August 2019, but symptoms have worsened since Friday 9/20/19.  Abdominal pain is cramping in nature, due to nausea/vomiting he has been throwing up most of oral intake, and stools have been over 10 brown-liquid stools/day without any blood as he would usually see with his previous ulcerative colitis flare.  He is afebrile and no leukocytosis, but needs to be taken into consideration his immunosuppression status.  Albumin 4 and protein 7.4 are normal but creatinine is elevated at 2.4, likely related to dehydration from increased diarrhea, nausea and vomiting.    Patient's presentation and symptoms appear to be related to the C. difficile infection rather than a UC flare, plan is to start oral vancomycin and manage nausea and vomiting with antiemetics.    #Ulcerative pancolitis:  Previoulsy quiescent since 2017 on Balsalazide monotherapy.     #Elevated alkaline phosphatase:  Rising since 6/17/19, now 301. Patient needs further evaluation for PSC with MRCP given history of UC. Total bilirubin, AST/ALT remain  normal. Will continue to monitor.      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 Next EGD is due in 2020. Will continue PPI.        Recommendations  --Diet as tolerated  --Monitor vital signs, and trend WBC/CRP/LFTs  --125 mg of oral vancomycin X4/day for total of 10 days  --Avoid narcotics as these medications increase the risk of mortality in IBD patients  --Scheduled and as needed antiemetics  --Continue with home Balsalazide  --Patient will need enoxaparin for DVT prophylaxis as IBD patients are in hypercoagulable state  --Supportive care with IV hydration and optimizing electrolytes  --Discuss with infectious diseases team for the management of CMV viremia  --Accurate documentation of output (color, characteristics, and amount)  --Baseline CRP and sed rate added  --Chest/abdoman and pelvic CT pending    Gastroenterology follow up recommendations: TBD    Patient care plan discussed with Dr. Allen, GI staff physician. Thank you for involving us in this patient's care. Please do not hesitate to contact the GI service with any questions or concerns.     Marilee Webre CNP  Department of Gastroenterology   -------------------------------------------------------------------------------------------------------------------   History is obtained from the patient and the medical record.          History of Present Illness:   Everton Larios is a 54 year old male with a history of ulcerative pancolitis diagnosed in 2005 (on Balsalazide), CMV viremia, C. Diff on 9/20/19, ASD s/p repair in childhood, NICM from congenital heart disease s/p OHT on 2/24/19, immunosuppressed state (on tacrolimus, MMF and prednisone), Luisito's esophagus w/o dysplasia, osteoporosis and now being admitted for abdominal pain, nausea/vomiting and diarrhea.     Patient had L-sided/RLQ abdominal  nakul, nausea/vomiting and diarrhea since mid August 2019, but symptoms have worsened since Friday 9/20/19.  Abdominal pain is cramping in nature, due to nausea/vomiting he has been throwing up most of oral intake without blood/coffee ground emesis, and stools have been over 10 brown-liquid stools/day without any blood as he would usually see with his previous ulcerative colitis flare.  Occasionally he was able to keep bites of sandwich and liquid but not much.  Patient noted having chills since transplant which has not been worse than before.  He denies fever or body aches.  He reports having cough with white sputum for approximately 2 weeks.  He supposed to get oral vancomycin on Sunday but pharmacy was closed.  Today he called the clinic to notify how he is feeling and he was directed to come to the emergency department.  No NSAID, tobacco, or alcohol. Patient denies recent travel.use or being exposed to sick people besides the multiple hospitalization that he had recently.        Past Medical History:   Reviewed and edited as appropriate  Past Medical History:   Diagnosis Date     Alcohol abuse      Pierce's esophagus 10/4/2018     Chronic rhinitis 10/4/2018     Chronic systolic heart failure (H) 10/4/2018     Depression 10/4/2018     Diastolic dysfunction      DJD (degenerative joint disease) - neck 10/4/2018     H/O congenital atrial septal defect (ASD) repair at age 3 10/4/2018     Hypothyroidism due to medication 10/4/2018     ICD, [employer] 2008; gen change 2/2018 10/4/2018     Intermittent asthma without complication 10/4/2018     Nonischemic cardiomyopathy (H) 10/4/2018     On amiodarone therapy 10/4/2018     Paroxysmal atrial fibrillation (H) 10/4/2018     S/P ablation of atrial fibrillation 10/4/2018     Systolic heart failure (H)      Ulcerative colitis (H) 10/4/2018     Ventricular tachycardia (H) 10/4/2018            Past Surgical History:   Reviewed and edited as appropriate   Past Surgical  History:   Procedure Laterality Date     AICD, DUAL CHAMBER       BRONCHOSCOPY (RIGID OR FLEXIBLE), DIAGNOSTIC N/A 4/30/2019    Procedure: BRONCHOSCOPY, WITH BAL;  Surgeon: Margot Chiang MD;  Location:  GI     CV HEART BIOPSY N/A 3/4/2019    Procedure: Heart Biopsy;  Surgeon: Abhijeet Titus MD;  Location:  HEART CARDIAC CATH LAB     CV HEART BIOPSY N/A 3/25/2019    Procedure: Heart Biopsy;  Surgeon: Yves Rodrigues MD;  Location:  HEART CARDIAC CATH LAB     CV HEART BIOPSY N/A 3/28/2019    Procedure: Heart Cath Heart Biopsy;  Surgeon: Yves Rodrigues MD;  Location:  HEART CARDIAC CATH LAB     CV HEART BIOPSY N/A 4/10/2019    Procedure: CV HEART BIOPSY;  Surgeon: Isiah Mcallister MD;  Location:  HEART CARDIAC CATH LAB     CV HEART BIOPSY N/A 4/24/2019    Procedure: CV HEART BIOPSY;  Surgeon: Isiah Mcallister MD;  Location:  HEART CARDIAC CATH LAB     CV HEART BIOPSY N/A 5/8/2019    Procedure: CV HEART BIOPSY;  Surgeon: Isiah Mcallister MD;  Location:  HEART CARDIAC CATH LAB     CV HEART BIOPSY N/A 6/4/2019    Procedure: CV HEART BIOPSY;  Surgeon: Alton Solomon MD;  Location:  HEART CARDIAC CATH LAB     CV HEART BIOPSY N/A 5/22/2019    Procedure: CV HEART BIOPSY;  Surgeon: Yves Rodrigues MD;  Location:  HEART CARDIAC CATH LAB     CV HEART BIOPSY N/A 7/17/2019    Procedure: CV HEART BIOPSY;  Surgeon: Isiah Mcallister MD;  Location:  HEART CARDIAC CATH LAB     CV HEART BIOPSY N/A 8/13/2019    Procedure: CV HEART BIOPSY;  Surgeon: Jackson Patten MD;  Location:  HEART CARDIAC CATH LAB     CV INTRA-AORTIC BALLOON PUMP INSERTION N/A 2/18/2019    Procedure: Intra-Aortic Balloon;  Surgeon: Alton Solomon MD;  Location:  HEART CARDIAC CATH LAB     CV INTRA-AORTIC BALLOON PUMP INSERTION N/A 2/20/2019    Procedure: Replace subclavian IABP;  Surgeon: Yves Rodrigues MD;  [facility]     CV LEFT HEART CATH  N/A 3/19/2019    Procedure: Left Heart Cath;  Surgeon: Yves Rodrigues MD;  Location:  HEART CARDIAC CATH LAB     CV RIGHT HEART CATH N/A 3/19/2019    Procedure: RHC/HBx - Femoral access for 3/19 per Nimisha GONZALEZ;  Surgeon: Yves Rodrigues MD;  [facility]     CV RIGHT HEART CATH N/A 3/25/2019    Procedure: Right Heart Cath;  Surgeon: Yves Rodrigues MD;  [facility]     CV RIGHT HEART CATH N/A 3/28/2019    Procedure: Heart Cath Right Heart Cath;  Surgeon: Yves Rodrigues MD;  Location:  HEART CARDIAC CATH LAB     CV RIGHT HEART CATH N/A 4/24/2019    Procedure: CV RIGHT HEART CATH;  Surgeon: Isiah Mcallister MD;  Location:  HEART CARDIAC CATH LAB     CV RIGHT HEART CATH N/A 3/11/2019    Procedure: ADD ON RHC/HBX;  Surgeon: Alton Solomon MD;  Location:  HEART CARDIAC CATH LAB     CV RIGHT HEART CATH N/A 6/4/2019    Procedure: CV RIGHT HEART CATH;  Surgeon: Alton Solomon MD;  Location:  HEART CARDIAC CATH LAB     CV RIGHT HEART CATH N/A 5/22/2019    Procedure: CV RIGHT HEART CATH;  Surgeon: Yves Rodrigues MD;  Location:  HEART CARDIAC CATH LAB     CV RIGHT HEART CATH N/A 8/13/2019    Procedure: CV RIGHT HEART CATH;  Surgeon: Jackson Patten MD;  Location:  HEART CARDIAC CATH LAB     INCISION AND DRAINAGE CHEST WASHOUT, COMBINED N/A 3/21/2019    Procedure: Incision And Drainage; Evacuation Right Chest Wall Hematoma;  Surgeon: Dewayne House MD;  [facility]: [facility]     INSERT INTRAAORTIC BALLOON PUMP Left 2/19/2019    Procedure: Insert left  Subclavian Balloon Pump,  Removal Right femoral arterial balloom pump sheath;  Surgeon: Dewayne House MD;  [facility]     INSERT INTRAAORTIC BALLOON PUMP Left 2/21/2019    Procedure: SUBCLAVIAN BALLOON PUMP PLACEMENT;  Surgeon: Ben White MD;  Location: [facility]     IR PICC PLACEMENT > 5 YRS OF AGE  5/8/2019     TRANSPLANT HEART RECIPIENT N/A  2019    Procedure: TRANSPLANT HEART RECIPIENT;  Surgeon: Dewayne House MD;  Location: [facility]            Previous Endoscopy:   No results found for this or any previous visit.         Social History:   Reviewed and edited as appropriate  Social History     Socioeconomic History     Marital status: Single     Spouse name: Not on file     Number of children: Not on file     Years of education: Not on file     Highest education level: Not on file   Occupational History     Not on file   Social Needs     Financial resource strain: Not on file     Food insecurity:     Worry: Not on file     Inability: Not on file     Transportation needs:     Medical: Not on file     Non-medical: Not on file   Tobacco Use     Smoking status: Former Smoker     Packs/day: 0.00     Years: 10.00     Pack years: 0.00     Start date: 1980     Last attempt to quit: 2008     Years since quittin.7     Smokeless tobacco: Never Used   Substance and Sexual Activity     Alcohol use: Not Currently     Alcohol/week: 1.0 standard drinks     Types: 1 Cans of beer per week     Drug use: No     Sexual activity: Not on file   Lifestyle     Physical activity:     Days per week: Not on file     Minutes per session: Not on file     Stress: Not on file   Relationships     Social connections:     Talks on phone: Not on file     Gets together: Not on file     Attends Taoist service: Not on file     Active member of club or organization: Not on file     Attends meetings of clubs or organizations: Not on file     Relationship status: Not on file     Intimate partner violence:     Fear of current or ex partner: Not on file     Emotionally abused: Not on file     Physically abused: Not on file     Forced sexual activity: Not on file   Other Topics Concern     Not on file   Social History Narrative    Everton is a retired . He lives by himself in a townhouse in [place]. He has no lawn care responsibilities. He will be  staying with his folks during his post-hospital early transplant care time. No history of TB exposures.             Family History:   Reviewed and edited as appropriate  Family History   Problem Relation Age of Onset     Endometrial Cancer Mother      Hypertension Father      Endometrial Cancer Maternal Grandmother            Allergies:   Reviewed and edited as appropriate     Allergies   Allergen Reactions     Hydromorphone Other (See Comments)     Significant Delirium     Adhesive Tape Blisters     Tegaderm causes bruises, blisters and extreme irritation.     Lisinopril Other (See Comments)     hypotension     Quinolones Other (See Comments)     Would not rx quinolones until QTc interval improved.      Tobramycin Other (See Comments)     Would not use aminoglycosides as his kidney function is very borderline     Codeine Nausea            Medications:     Medications Prior to Admission   Medication Sig Dispense Refill Last Dose     acetaminophen (TYLENOL) 325 MG tablet Take 2 tablets (650 mg) by mouth every 4 hours as needed for mild pain   Past Week at Unknown time     albuterol (PROAIR HFA/PROVENTIL HFA/VENTOLIN HFA) 108 (90 Base) MCG/ACT inhaler Inhale 2 puffs into the lungs every 6 hours as needed for shortness of breath / dyspnea or wheezing   Past Week at Unknown time     amLODIPine (NORVASC) 5 MG tablet TAKE 1 TABLET BY MOUTH EVERY DAY 30 tablet 2 9/23/2019 at 0800     aspirin (ASA) 81 MG chewable tablet Take 1 tablet (81 mg) by mouth daily   9/23/2019 at 0800     calcium carbonate 600 mg-vitamin D 400 units (CALTRATE) 600-400 MG-UNIT per tablet Take 1 tablet by mouth 2 times daily (with meals)   9/23/2019 at 10     cetirizine (ZYRTEC) 10 MG tablet Take 10 mg by mouth daily   9/23/2019 at 0800     DULoxetine (CYMBALTA) 20 MG EC capsule Take 20 mg by mouth daily   9/23/2019 at 800     fluticasone (FLOVENT HFA) 220 MCG/ACT Inhaler Inhale 2 puffs into the lungs 2 times daily   Past Month at Unknown time      KLOR-CON 20 MEQ CR tablet TAKE 1 TABLET (20 MEQ) BY MOUTH DAILY 90 tablet 1 9/23/2019 at 800     levothyroxine (SYNTHROID/LEVOTHROID) 100 MCG tablet Take 100 mcg by mouth daily    9/23/2019 at 0630     magnesium oxide (MAG-OX) 400 MG tablet Take 1 tablet (400 mg) by mouth 2 times daily 180 tablet 3 9/23/2019 at 1400     melatonin 3 MG tablet Take 2 tablets (6 mg) by mouth At Bedtime (Patient taking differently: Take 5 mg by mouth At Bedtime )   9/22/2019 at Unknown time     mesalamine (ASACOL HD) 800 MG EC tablet Take 1 tablet (800 mg) by mouth 3 times daily 270 tablet 1 9/23/2019 at 1200     multivitamin w/minerals (THERA-VIT-M) tablet Take 1 tablet by mouth daily   9/23/2019 at Unknown time     mycophenolate (GENERIC EQUIVALENT) 250 MG capsule Take 6 capsules (1,500 mg) by mouth 2 times daily 360 capsule 11 9/23/2019 at 6     omeprazole (PRILOSEC) 40 MG DR capsule Take 1 capsule (40 mg) by mouth daily   9/23/2019 at 0800     rosuvastatin (CRESTOR) 10 MG tablet Take 1 tablet (10 mg) by mouth daily 90 tablet 1 9/23/2019 at Unknown time     tacrolimus (GENERIC EQUIVALENT) 1 MG capsule Take 4 capsules (4 mg) by mouth 2 times daily 720 capsule 3 9/23/2019 at 0800     valGANciclovir (VALCYTE) 450 MG tablet Take 1 tablet (450 mg) by mouth daily 28 tablet 1 9/23/2019 at 8     vancomycin (VANCOCIN) 125 MG capsule Take 1 capsule (125 mg) by mouth 4 times daily for 10 days 40 capsule 0 9/23/2019 at Unknown time     fluticasone (FLONASE) 50 MCG/ACT nasal spray Spray 1 spray into both nostrils 2 times daily as needed    More than a month at Unknown time     montelukast (SINGULAIR) 10 MG tablet Take 1 tablet (10 mg) by mouth At Bedtime   Unknown at Unknown time     ondansetron (ZOFRAN-ODT) 4 MG ODT tab DISSOLVE 1 TABLET ON THE TONGUE EVERY EIGHT HOURS AS NEEDED  3 Unknown at Unknown time     polyethylene glycol (MIRALAX/GLYCOLAX) powder Take 1 capful by mouth daily   Unknown at Unknown time     senna (SENOKOT) 8.6 MG tablet  "Take 2 tablets by mouth 2 times daily as needed for constipation   Not Taking     tacrolimus (GENERIC EQUIVALENT) 0.5 MG capsule Pt currently not taking. 90 capsule 3 Taking             Review of Systems:   A complete review of systems was performed and is negative except as noted in the HPI           Physical Exam:   /84   Pulse 108   Temp 97.7  F (36.5  C) (Oral)   Resp 18   Ht 1.727 m (5' 8\")   Wt 55.3 kg (122 lb)   SpO2 100%   BMI 18.55 kg/m    Wt:   Wt Readings from Last 2 Encounters:   09/23/19 55.3 kg (122 lb)   09/17/19 56.2 kg (123 lb 14.4 oz)      Constitutional: cooperative, pleasant, not dyspneic/diaphoretic, no acute distress  Eyes: Sclera anicteric/injected  Ears/nose/mouth/throat: Normal oropharynx without ulcers or exudate, mucus membranes moist, hearing intact  Neck: supple, thyroid normal size  CV: RRR. No edema in LE bilaterally   Respiratory: Unlabored breathing. Slightly diminished lower lobes bilaterally  Lymph: No axillary, submandibular, supraclavicular or inguinal lymphadenopathy  Abd: Nondistended, +bs, no hepatosplenomegaly, nontender, no peritoneal signs  Skin: warm, perfused, no jaundice  Neuro: AAO x 3, No asterixis  Psych: Normal affect  MSK: No gross deformities          Data:   Labs and imaging below were independently reviewed and interpreted    BMP  Recent Labs   Lab 09/23/19  1534 09/17/19  0856    136   POTASSIUM 4.6 4.1   CHLORIDE 108 110*   RADAMES PENDING 9.1   CO2 PENDING 18*   BUN PENDING 37*   CR PENDING 2.02*   GLC PENDING 90     CBC  Recent Labs   Lab 09/23/19  1534 09/17/19  1702 09/17/19  0856   WBC 4.6 3.7* 3.5*   RBC 3.97* 3.87* 3.56*   HGB 10.2* 10.1* 9.5*   HCT 32.7* 32.8* 28.5*   MCV 82 85 80   MCH 25.7* 26.1* 26.7   MCHC 31.2* 30.8* 33.3   RDW 13.9 14.0 14.2    284 279     INR  Recent Labs   Lab 09/23/19  1534   INR 1.17*     LFTs  Recent Labs   Lab 09/23/19  1534 09/17/19  0856   ALKPHOS PENDING 285*   AST PENDING 16   ALT PENDING 23 "   BILITOTAL PENDING 0.3   PROTTOTAL PENDING 6.6*   ALBUMIN PENDING 3.6      PANCNo lab results found in last 7 days.

## 2019-09-24 ENCOUNTER — APPOINTMENT (OUTPATIENT)
Dept: GENERAL RADIOLOGY | Facility: CLINIC | Age: 54
DRG: 372 | End: 2019-09-24
Attending: STUDENT IN AN ORGANIZED HEALTH CARE EDUCATION/TRAINING PROGRAM
Payer: COMMERCIAL

## 2019-09-24 ENCOUNTER — TELEPHONE (OUTPATIENT)
Dept: GASTROENTEROLOGY | Facility: CLINIC | Age: 54
End: 2019-09-24

## 2019-09-24 ENCOUNTER — APPOINTMENT (OUTPATIENT)
Dept: ULTRASOUND IMAGING | Facility: CLINIC | Age: 54
DRG: 372 | End: 2019-09-24
Payer: COMMERCIAL

## 2019-09-24 LAB
ALBUMIN SERPL-MCNC: 2.9 G/DL (ref 3.4–5)
ALP SERPL-CCNC: 218 U/L (ref 40–150)
ALT SERPL W P-5'-P-CCNC: 14 U/L (ref 0–70)
ANION GAP SERPL CALCULATED.3IONS-SCNC: 11 MMOL/L (ref 3–14)
ANION GAP SERPL CALCULATED.3IONS-SCNC: 13 MMOL/L (ref 3–14)
ANION GAP SERPL CALCULATED.3IONS-SCNC: 8 MMOL/L (ref 3–14)
AST SERPL W P-5'-P-CCNC: 11 U/L (ref 0–45)
BASOPHILS # BLD AUTO: 0 10E9/L (ref 0–0.2)
BASOPHILS NFR BLD AUTO: 0.8 %
BILIRUB SERPL-MCNC: 0.4 MG/DL (ref 0.2–1.3)
BUN SERPL-MCNC: 30 MG/DL (ref 7–30)
BUN SERPL-MCNC: 40 MG/DL (ref 7–30)
BUN SERPL-MCNC: 41 MG/DL (ref 7–30)
CALCIUM SERPL-MCNC: 7.6 MG/DL (ref 8.5–10.1)
CALCIUM SERPL-MCNC: 7.9 MG/DL (ref 8.5–10.1)
CALCIUM SERPL-MCNC: 8.1 MG/DL (ref 8.5–10.1)
CHLORIDE SERPL-SCNC: 112 MMOL/L (ref 94–109)
CHLORIDE SERPL-SCNC: 114 MMOL/L (ref 94–109)
CHLORIDE SERPL-SCNC: 116 MMOL/L (ref 94–109)
CO2 SERPL-SCNC: 12 MMOL/L (ref 20–32)
CO2 SERPL-SCNC: 12 MMOL/L (ref 20–32)
CO2 SERPL-SCNC: 15 MMOL/L (ref 20–32)
CREAT SERPL-MCNC: 1.66 MG/DL (ref 0.66–1.25)
CREAT SERPL-MCNC: 1.78 MG/DL (ref 0.66–1.25)
CREAT SERPL-MCNC: 1.88 MG/DL (ref 0.66–1.25)
CRP SERPL-MCNC: <2.9 MG/L (ref 0–8)
DIFFERENTIAL METHOD BLD: ABNORMAL
EOSINOPHIL # BLD AUTO: 0.3 10E9/L (ref 0–0.7)
EOSINOPHIL NFR BLD AUTO: 7 %
ERYTHROCYTE [DISTWIDTH] IN BLOOD BY AUTOMATED COUNT: 14 % (ref 10–15)
GFR SERPL CREATININE-BSD FRML MDRD: 39 ML/MIN/{1.73_M2}
GFR SERPL CREATININE-BSD FRML MDRD: 42 ML/MIN/{1.73_M2}
GFR SERPL CREATININE-BSD FRML MDRD: 45 ML/MIN/{1.73_M2}
GLUCOSE SERPL-MCNC: 109 MG/DL (ref 70–99)
GLUCOSE SERPL-MCNC: 116 MG/DL (ref 70–99)
GLUCOSE SERPL-MCNC: 84 MG/DL (ref 70–99)
HCT VFR BLD AUTO: 24.2 % (ref 40–53)
HGB BLD-MCNC: 7.6 G/DL (ref 13.3–17.7)
IMM GRANULOCYTES # BLD: 0.1 10E9/L (ref 0–0.4)
IMM GRANULOCYTES NFR BLD: 3.6 %
INTERPRETATION ECG - MUSE: NORMAL
INTERPRETATION ECG - MUSE: NORMAL
LYMPHOCYTES # BLD AUTO: 0.8 10E9/L (ref 0.8–5.3)
LYMPHOCYTES NFR BLD AUTO: 23 %
MCH RBC QN AUTO: 25.8 PG (ref 26.5–33)
MCHC RBC AUTO-ENTMCNC: 31.4 G/DL (ref 31.5–36.5)
MCV RBC AUTO: 82 FL (ref 78–100)
MONOCYTES # BLD AUTO: 0.4 10E9/L (ref 0–1.3)
MONOCYTES NFR BLD AUTO: 11.8 %
NEUTROPHILS # BLD AUTO: 1.9 10E9/L (ref 1.6–8.3)
NEUTROPHILS NFR BLD AUTO: 53.8 %
NRBC # BLD AUTO: 0 10*3/UL
NRBC BLD AUTO-RTO: 0 /100
PLATELET # BLD AUTO: 234 10E9/L (ref 150–450)
POTASSIUM SERPL-SCNC: 3.9 MMOL/L (ref 3.4–5.3)
POTASSIUM SERPL-SCNC: 3.9 MMOL/L (ref 3.4–5.3)
POTASSIUM SERPL-SCNC: 4.1 MMOL/L (ref 3.4–5.3)
PROT SERPL-MCNC: 5.4 G/DL (ref 6.8–8.8)
RBC # BLD AUTO: 2.95 10E12/L (ref 4.4–5.9)
SODIUM SERPL-SCNC: 137 MMOL/L (ref 133–144)
SODIUM SERPL-SCNC: 137 MMOL/L (ref 133–144)
SODIUM SERPL-SCNC: 138 MMOL/L (ref 133–144)
TACROLIMUS BLD-MCNC: 7 UG/L (ref 5–15)
TME LAST DOSE: NORMAL H
WBC # BLD AUTO: 3.6 10E9/L (ref 4–11)

## 2019-09-24 PROCEDURE — 36415 COLL VENOUS BLD VENIPUNCTURE: CPT | Performed by: STUDENT IN AN ORGANIZED HEALTH CARE EDUCATION/TRAINING PROGRAM

## 2019-09-24 PROCEDURE — 25000131 ZZH RX MED GY IP 250 OP 636 PS 637: Performed by: STUDENT IN AN ORGANIZED HEALTH CARE EDUCATION/TRAINING PROGRAM

## 2019-09-24 PROCEDURE — 85025 COMPLETE CBC W/AUTO DIFF WBC: CPT | Performed by: STUDENT IN AN ORGANIZED HEALTH CARE EDUCATION/TRAINING PROGRAM

## 2019-09-24 PROCEDURE — 74018 RADEX ABDOMEN 1 VIEW: CPT

## 2019-09-24 PROCEDURE — 80053 COMPREHEN METABOLIC PANEL: CPT | Performed by: STUDENT IN AN ORGANIZED HEALTH CARE EDUCATION/TRAINING PROGRAM

## 2019-09-24 PROCEDURE — 99233 SBSQ HOSP IP/OBS HIGH 50: CPT | Mod: GC | Performed by: INTERNAL MEDICINE

## 2019-09-24 PROCEDURE — 25000132 ZZH RX MED GY IP 250 OP 250 PS 637: Performed by: STUDENT IN AN ORGANIZED HEALTH CARE EDUCATION/TRAINING PROGRAM

## 2019-09-24 PROCEDURE — 25000132 ZZH RX MED GY IP 250 OP 250 PS 637: Performed by: NURSE PRACTITIONER

## 2019-09-24 PROCEDURE — 80048 BASIC METABOLIC PNL TOTAL CA: CPT | Performed by: STUDENT IN AN ORGANIZED HEALTH CARE EDUCATION/TRAINING PROGRAM

## 2019-09-24 PROCEDURE — 86140 C-REACTIVE PROTEIN: CPT | Performed by: STUDENT IN AN ORGANIZED HEALTH CARE EDUCATION/TRAINING PROGRAM

## 2019-09-24 PROCEDURE — 25000128 H RX IP 250 OP 636: Performed by: STUDENT IN AN ORGANIZED HEALTH CARE EDUCATION/TRAINING PROGRAM

## 2019-09-24 PROCEDURE — 76642 ULTRASOUND BREAST LIMITED: CPT | Mod: LT

## 2019-09-24 PROCEDURE — 12000012 ZZH R&B MS OVERFLOW UMMC

## 2019-09-24 PROCEDURE — 80197 ASSAY OF TACROLIMUS: CPT | Performed by: STUDENT IN AN ORGANIZED HEALTH CARE EDUCATION/TRAINING PROGRAM

## 2019-09-24 RX ORDER — TACROLIMUS 1 MG/1
2 CAPSULE ORAL
Status: DISCONTINUED | OUTPATIENT
Start: 2019-09-24 | End: 2019-09-26

## 2019-09-24 RX ADMIN — MYCOPHENOLATE MOFETIL 1500 MG: 250 CAPSULE ORAL at 08:04

## 2019-09-24 RX ADMIN — Medication 1 TABLET: at 08:04

## 2019-09-24 RX ADMIN — TACROLIMUS 2 MG: 1 CAPSULE ORAL at 17:35

## 2019-09-24 RX ADMIN — ACETAMINOPHEN 650 MG: 325 TABLET, FILM COATED ORAL at 04:12

## 2019-09-24 RX ADMIN — ROSUVASTATIN CALCIUM 10 MG: 10 TABLET, FILM COATED ORAL at 08:03

## 2019-09-24 RX ADMIN — VANCOMYCIN HYDROCHLORIDE 125 MG: KIT at 20:45

## 2019-09-24 RX ADMIN — VALGANCICLOVIR 450 MG: 450 TABLET, FILM COATED ORAL at 08:03

## 2019-09-24 RX ADMIN — MESALAMINE 800 MG: 800 TABLET, DELAYED RELEASE ORAL at 08:04

## 2019-09-24 RX ADMIN — MESALAMINE 800 MG: 800 TABLET, DELAYED RELEASE ORAL at 20:46

## 2019-09-24 RX ADMIN — Medication 1 TABLET: at 17:35

## 2019-09-24 RX ADMIN — Medication 25 MG: at 17:35

## 2019-09-24 RX ADMIN — VANCOMYCIN HYDROCHLORIDE 125 MG: KIT at 16:53

## 2019-09-24 RX ADMIN — MYCOPHENOLATE MOFETIL 1500 MG: 250 CAPSULE ORAL at 20:46

## 2019-09-24 RX ADMIN — MONTELUKAST 10 MG: 10 TABLET, FILM COATED ORAL at 20:45

## 2019-09-24 RX ADMIN — LEVOTHYROXINE SODIUM 100 MCG: 100 TABLET ORAL at 08:04

## 2019-09-24 RX ADMIN — DULOXETINE HYDROCHLORIDE 20 MG: 20 CAPSULE, DELAYED RELEASE ORAL at 08:03

## 2019-09-24 RX ADMIN — VANCOMYCIN HYDROCHLORIDE 125 MG: KIT at 11:49

## 2019-09-24 RX ADMIN — SODIUM CHLORIDE 1000 ML: 9 INJECTION, SOLUTION INTRAVENOUS at 04:12

## 2019-09-24 RX ADMIN — ASPIRIN 81 MG CHEWABLE TABLET 81 MG: 81 TABLET CHEWABLE at 08:03

## 2019-09-24 RX ADMIN — MESALAMINE 800 MG: 800 TABLET, DELAYED RELEASE ORAL at 14:35

## 2019-09-24 RX ADMIN — HEPARIN SODIUM 5000 UNITS: 5000 INJECTION, SOLUTION INTRAVENOUS; SUBCUTANEOUS at 08:04

## 2019-09-24 RX ADMIN — SODIUM CHLORIDE 1000 ML: 9 INJECTION, SOLUTION INTRAVENOUS at 00:15

## 2019-09-24 RX ADMIN — MELATONIN TAB 3 MG 6 MG: 3 TAB at 20:45

## 2019-09-24 RX ADMIN — OMEPRAZOLE 40 MG: 20 CAPSULE, DELAYED RELEASE ORAL at 08:03

## 2019-09-24 RX ADMIN — VANCOMYCIN HYDROCHLORIDE 125 MG: KIT at 08:04

## 2019-09-24 RX ADMIN — ACETAMINOPHEN 650 MG: 325 TABLET, FILM COATED ORAL at 20:45

## 2019-09-24 RX ADMIN — ACETAMINOPHEN 650 MG: 325 TABLET, FILM COATED ORAL at 15:55

## 2019-09-24 ASSESSMENT — ENCOUNTER SYMPTOMS
ABDOMINAL PAIN: 1
ARTHRALGIAS: 0
FEVER: 0
APPETITE CHANGE: 1
SHORTNESS OF BREATH: 0
DECREASED CONCENTRATION: 1
SINUS PAIN: 0
BACK PAIN: 0
DIARRHEA: 1
TROUBLE SWALLOWING: 0
WOUND: 0
CONFUSION: 0
FATIGUE: 1
HEADACHES: 0
BLOOD IN STOOL: 0
WEAKNESS: 1
LIGHT-HEADEDNESS: 1
DYSPHORIC MOOD: 1
DIFFICULTY URINATING: 0
EYE REDNESS: 0
JOINT SWELLING: 0
PALPITATIONS: 0
ACTIVITY CHANGE: 1
SINUS PRESSURE: 0
HALLUCINATIONS: 0
CHEST TIGHTNESS: 1
COLOR CHANGE: 0
NAUSEA: 1
RHINORRHEA: 0
DYSURIA: 0
NECK STIFFNESS: 0
WHEEZING: 0
VOMITING: 1

## 2019-09-24 ASSESSMENT — ACTIVITIES OF DAILY LIVING (ADL)
ADLS_ACUITY_SCORE: 14
ADLS_ACUITY_SCORE: 13

## 2019-09-24 ASSESSMENT — MIFFLIN-ST. JEOR: SCORE: 1337.5

## 2019-09-24 ASSESSMENT — PAIN DESCRIPTION - DESCRIPTORS
DESCRIPTORS: ACHING
DESCRIPTORS: ACHING;DISCOMFORT
DESCRIPTORS: ACHING
DESCRIPTORS: ACHING

## 2019-09-24 NOTE — PLAN OF CARE
Admission          9/23/2019  3:27 PM  -----------------------------------------------------------  Reason for admission:  Dehydration, Cdiff, N/V, CMV, FRANKLIN  Primary team notified of pt arrival.  Admitted from:  ED  Via: stretcher  Accompanied by: ED staff  Belongings: Placed in closet; valuables sent home with family  Admission Profile: complete  Teaching: orientation to unit and call light- call light within reach, call don't fall, use of console, meal times, when to call for the RN, and enforced importance of safety   Access: PIV  Telemetry:Placed on pt  Ht./Wt.: complete  2 RN Skin Assessment Completed By: Franko GUZMAN RN and Neli ED RN  Pt status: Stable.  Temp:  [97.7  F (36.5  C)-98.7  F (37.1  C)] 98.7  F (37.1  C)  Pulse:  [106-115] 106  Resp:  [18] 18  BP: (112-134)/(52-87) 130/84  SpO2:  [99 %-100 %] 100 %

## 2019-09-24 NOTE — H&P
Cardiology History and Physical         Assessment and Plan:   Everton Larios is a 54 year old male with a history of NICM s/p OHT (2/24/2019), paroxysmal atrial fibrillation, recent T12 compression fracture (4/21/2019), ulcerative colitis, Pierce's esophagus, hypothyroidism, GERD, BPH and depression who is admitted for further evaluation and management acute kidney injury in the setting of worsening CMV viremia and newly diagnosed clostridium difficile.    #Clostridium Difficile Colitis  #CMV Viremia  - transplant ID consultation  - continue valacyclovir 125 mg daily  - start PO vancomycin 125 mg QID    #Acute renal injury:  - intravenous fluids with treatment of the above    #Ulcerative colitis:   - gastroenterology consultation  - continue home mesalamine 800 mg TID    #Left breast pain / swelling under left nipple  - discussed with staff radiologist who agrees this area on CT appears a little more prominent compared to the right side, but does not appear concerning for abscess or other malignant pathology (no new suspicious lymph nodes, etc)  - will obtain ultrasound of this tomorrow    #Nonischemic cardiomyopathy s/p OHT (2/24/2019)  - Most recent biopsy 8/13/19 1R (stable)  - Serostatus: CMV+, EBV+, HSV1+, HSV2 neg, VZV+  - Immunosuppresives:    - continue home Cellcept 1,500 mg BID   - hold Tacrolimus due to supra-therapeutic level and FRANKLIN  - Tacrolimus level in AM ordered (goal 6 - 8 micrograms/L)  - PPx: none, therapeutic valacyclovir as above  - Diuretics: none  - ASA 81mg daily  - Rosuvastatin 10mg daily     #Hypothyroidism: continue levothyroxine, last TSH was low-recheck in AM  #GERD and history of Pierce's esophagus: continue omeprazole   #Depression: continue duloxetine   #Seasonal allergies: fluticasone, montelukast   #Insomnia: continue melatonin   #Hypertension: continue amlodipine 5 mg    Diet/IVF: general as tolerated  DVT ppx: heparin SQ  Disposition/Admission Status: likely a few  days  CODE: Full Code     Will staff with Dr. Young in AM.     Rajeev Eisenberg  Cardiology Fellow         Chief Complaint:   Productive cough, abdominal pain, sinus congestion, chest discomfort         HPI:   Everton Larios is a 54 year old male with a history of NICM s/p OHT (2/24/2019), paroxysmal atrial fibrillation, recent T12 compression fracture (4/21/2019), ulcerative colitis, Pierce's esophagus, hypothyroidism, GERD, BPH and depression who is admitted for further evaluation and management acute kidney injury in the setting of worsening CMV viremia and newly diagnosed clostridium difficile.    He notes 10 lb weight loss over the past month along with mild progressive crampy abdominal pain with associated reduced PO intake (water and food), diarrhea, and emesis. He does not have significant nausea, but the emesis comes after he tries to eat anything. Abdominal pain has not been severe, but more smoldering and ongoing. Diarrhea has been watery, no hematochezia. Likewise, emesis has been food contents and watery material mostly, no hematemesis. He has felt fatigued, and urine relatively dark, especially over past 2-3 days. Remembers the abdominal pain starting even back in early August, and it has progressively worsened. CMV titers have gradually increased since June, for which transplant ID has initiated treatment-dose valacyclovir which he started this past Friday (9/20). A fecal sample was tested, and resulted today for C Diff. He has not yet started treatment upon his arrival to the ER.    The only other collateral history is new onset left breast tenderness under his nipple. It has been relatively stable for about 4-weeks. No drainage. Feels a lump right under his left nipple.    No chest pain, shortness of breath, presyncope, syncope, PND, nor orthopnea. He has not had problems with his medications.         Past Medical History:     Past Medical History:   Diagnosis Date     Alcohol abuse      Pierce's  esophagus 10/4/2018     Chronic rhinitis 10/4/2018     Chronic systolic heart failure (H) 10/4/2018     Depression 10/4/2018     Diastolic dysfunction      DJD (degenerative joint disease) - neck 10/4/2018     H/O congenital atrial septal defect (ASD) repair at age 3 10/4/2018     Hypothyroidism due to medication 10/4/2018     ICD, [place] [employer] 2008; gen change 2/2018 10/4/2018     Intermittent asthma without complication 10/4/2018     Nonischemic cardiomyopathy (H) 10/4/2018     On amiodarone therapy 10/4/2018     Paroxysmal atrial fibrillation (H) 10/4/2018     S/P ablation of atrial fibrillation 10/4/2018     Systolic heart failure (H)      Ulcerative colitis (H) 10/4/2018     Ventricular tachycardia (H) 10/4/2018          Past Surgical History:     Past Surgical History:   Procedure Laterality Date     AICD, DUAL CHAMBER       BRONCHOSCOPY (RIGID OR FLEXIBLE), DIAGNOSTIC N/A 4/30/2019    Procedure: BRONCHOSCOPY, WITH BAL;  Surgeon: Margot Chiang MD;  Location:  GI     CV HEART BIOPSY N/A 3/4/2019    Procedure: Heart Biopsy;  Surgeon: Abhijeet Titus MD;  Location:  HEART CARDIAC CATH LAB     CV HEART BIOPSY N/A 3/25/2019    Procedure: Heart Biopsy;  Surgeon: Yves Rodrigues MD;  Location:  HEART CARDIAC CATH LAB     CV HEART BIOPSY N/A 3/28/2019    Procedure: Heart Cath Heart Biopsy;  Surgeon: Yves Rodrigues MD;  Location:  HEART CARDIAC CATH LAB     CV HEART BIOPSY N/A 4/10/2019    Procedure: CV HEART BIOPSY;  Surgeon: Isiah Mcallister MD;  Location:  HEART CARDIAC CATH LAB     CV HEART BIOPSY N/A 4/24/2019    Procedure: CV HEART BIOPSY;  Surgeon: Isiah Mcallister MD;  Location:  HEART CARDIAC CATH LAB     CV HEART BIOPSY N/A 5/8/2019    Procedure: CV HEART BIOPSY;  Surgeon: Isiah Mcallister MD;  Location:  HEART CARDIAC CATH LAB     CV HEART BIOPSY N/A 6/4/2019    Procedure: CV HEART BIOPSY;  Surgeon: Alton Solomon MD;  Location:  HEART CARDIAC CATH  LAB     CV HEART BIOPSY N/A 5/22/2019    Procedure: CV HEART BIOPSY;  Surgeon: Yves Rodrigues MD;  [facility]     CV HEART BIOPSY N/A 7/17/2019    Procedure: CV HEART BIOPSY;  Surgeon: Isiah Mcallister MD;  [facility]     CV HEART BIOPSY N/A 8/13/2019    Procedure: CV HEART BIOPSY;  Surgeon: Jackson Patten MD;  [facility]     CV INTRA-AORTIC BALLOON PUMP INSERTION N/A 2/18/2019    Procedure: Intra-Aortic Balloon;  Surgeon: Alton Solomon MD;  Location:  HEART CARDIAC CATH LAB     CV INTRA-AORTIC BALLOON PUMP INSERTION N/A 2/20/2019    Procedure: Replace subclavian IABP;  Surgeon: Yves Rodrigues MD;  Location:  HEART CARDIAC CATH LAB     CV LEFT HEART CATH N/A 3/19/2019    Procedure: Left Heart Cath;  Surgeon: Yves Rodrigues MD;  Location:  HEART CARDIAC CATH LAB     CV RIGHT HEART CATH N/A 3/19/2019    Procedure: RHC/HBx - Femoral access for 3/19 per Nimisha GONZALEZ;  Surgeon: Yves Rodrigues MD;  [facility]     CV RIGHT HEART CATH N/A 3/25/2019    Procedure: Right Heart Cath;  Surgeon: Yves Rodrigues MD;  [facility]     CV RIGHT HEART CATH N/A 3/28/2019    Procedure: Heart Cath Right Heart Cath;  Surgeon: Yves Rodrigues MD;  [facility]     CV RIGHT HEART CATH N/A 4/24/2019    Procedure: CV RIGHT HEART CATH;  Surgeon: Isiah Mcallister MD;  Location:  HEART CARDIAC CATH LAB     CV RIGHT HEART CATH N/A 3/11/2019    Procedure: ADD ON RHC/HBX;  Surgeon: Alton Solomon MD;  [facility]     CV RIGHT HEART CATH N/A 6/4/2019    Procedure: CV RIGHT HEART CATH;  Surgeon: Alton Solomon MD;  [facility]     CV RIGHT HEART CATH N/A 5/22/2019    Procedure: CV RIGHT HEART CATH;  Surgeon: Yves Rodrigues MD;  Location:  HEART CARDIAC CATH LAB     CV RIGHT  HEART CATH N/A 2019    Procedure: CV RIGHT HEART CATH;  Surgeon: Jackson Patten MD;  [facility]     INCISION AND DRAINAGE CHEST WASHOUT, COMBINED N/A 3/21/2019    Procedure: Incision And Drainage; Evacuation Right Chest Wall Hematoma;  Surgeon: Dewayne House MD;  Location: [facility]     INSERT INTRAAORTIC BALLOON PUMP Left 2019    Procedure: Insert left  Subclavian Balloon Pump,  Removal Right femoral arterial balloom pump sheath;  Surgeon: Dewayne House MD;  [facility]     INSERT INTRAAORTIC BALLOON PUMP Left 2019    Procedure: SUBCLAVIAN BALLOON PUMP PLACEMENT;  Surgeon: Ben White MD;  Location: [facility]     IR PICC PLACEMENT > 5 YRS OF AGE  2019     TRANSPLANT HEART RECIPIENT N/A 2019    Procedure: TRANSPLANT HEART RECIPIENT;  Surgeon: Dewayne House MD;  Location: [facility]            Social History:     Social History     Socioeconomic History     Marital status: Single     Spouse name: Not on file     Number of children: Not on file     Years of education: Not on file     Highest education level: Not on file   Occupational History     Not on file   Social Needs     Financial resource strain: Not on file     Food insecurity:     Worry: Not on file     Inability: Not on file     Transportation needs:     Medical: Not on file     Non-medical: Not on file   Tobacco Use     Smoking status: Former Smoker     Packs/day: 0.00     Years: 10.00     Pack years: 0.00     Start date: 1980     Last attempt to quit: 2008     Years since quittin.7     Smokeless tobacco: Never Used   Substance and Sexual Activity     Alcohol use: Not Currently     Alcohol/week: 1.0 standard drinks     Types: 1 Cans of beer per week     Drug use: No     Sexual activity: Not on file   Lifestyle     Physical activity:     Days per week: Not on file     Minutes per session: Not on file     Stress: Not on file   Relationships     Social  connections:     Talks on phone: Not on file     Gets together: Not on file     Attends L[employer] service: Not on file     Active member of club or organization: Not on file     Attends meetings of clubs or organizations: Not on file     Relationship status: Not on file     Intimate partner violence:     Fear of current or ex partner: Not on file     Emotionally abused: Not on file     Physically abused: Not on file     Forced sexual activity: Not on file   Other Topics Concern     Not on file   Social History Narrative    Everton is a retired . He lives by himself in a townhouse in [place]. He has no lawn care responsibilities. He will be staying with his folks during his post-hospital early transplant care time. No history of TB exposures.             Family History:     Family History   Problem Relation Age of Onset     Endometrial Cancer Mother      Hypertension Father      Endometrial Cancer Maternal Grandmother             Immunizations:     Immunization History   Administered Date(s) Administered     Influenza (IIV3) PF 10/22/2008, 09/25/2009, 11/07/2011, 11/30/2012, 10/27/2013, 10/02/2014     Influenza Vaccine IM > 6 months Valent IIV4 10/02/2015     Influenza Vaccine, 3 YRS +, IM (QUADRIVALENT W/PRESERVATIVES) 09/12/2016, 10/16/2017, 09/20/2018     OPV, trivalent, live 10/20/1978     Pneumo Conj 13-V (2010&after) 09/26/2018     TDAP Vaccine (Boostrix) 07/20/2012     Td (Adult), Adsorbed 10/20/1978              Allergies:      Allergies   Allergen Reactions     Hydromorphone Other (See Comments)     Significant Delirium     Adhesive Tape Blisters     Tegaderm causes bruises, blisters and extreme irritation.     Lisinopril Other (See Comments)     hypotension     Quinolones Other (See Comments)     Would not rx quinolones until QTc interval improved.      Tobramycin Other (See Comments)     Would not use aminoglycosides as his kidney function is very borderline     Codeine Nausea             Medications:     Prior to Admission Medications   Prescriptions Last Dose Informant Patient Reported? Taking?   DULoxetine (CYMBALTA) 20 MG EC capsule  Self Yes No   Sig: Take 20 mg by mouth daily   KLOR-CON 20 MEQ CR tablet   No No   Sig: TAKE 1 TABLET (20 MEQ) BY MOUTH DAILY   acetaminophen (TYLENOL) 325 MG tablet  Self No No   Sig: Take 2 tablets (650 mg) by mouth every 4 hours as needed for mild pain   Patient not taking: Reported on 9/17/2019   albuterol (PROAIR HFA/PROVENTIL HFA/VENTOLIN HFA) 108 (90 Base) MCG/ACT inhaler  Self Yes No   Sig: Inhale 2 puffs into the lungs every 6 hours as needed for shortness of breath / dyspnea or wheezing   amLODIPine (NORVASC) 5 MG tablet   No No   Sig: TAKE 1 TABLET BY MOUTH EVERY DAY   aspirin (ASA) 81 MG chewable tablet  Self No No   Sig: Take 1 tablet (81 mg) by mouth daily   calcium carbonate 600 mg-vitamin D 400 units (CALTRATE) 600-400 MG-UNIT per tablet  Self No No   Sig: Take 1 tablet by mouth 2 times daily (with meals)   cetirizine (ZYRTEC) 10 MG tablet  Self Yes No   Sig: Take 10 mg by mouth daily   fluticasone (FLONASE) 50 MCG/ACT nasal spray  Self Yes No   Sig: Spray 1 spray into both nostrils 2 times daily as needed    fluticasone (FLOVENT HFA) 220 MCG/ACT Inhaler  Self Yes No   Sig: Inhale 2 puffs into the lungs 2 times daily   levothyroxine (SYNTHROID/LEVOTHROID) 100 MCG tablet  Self Yes No   Sig: Take 100 mcg by mouth daily    magnesium oxide (MAG-OX) 400 MG tablet  Self No No   Sig: Take 1 tablet (400 mg) by mouth 2 times daily   melatonin 3 MG tablet  Self No No   Sig: Take 2 tablets (6 mg) by mouth At Bedtime   Patient taking differently: Take 5 mg by mouth At Bedtime    mesalamine (ASACOL HD) 800 MG EC tablet   No No   Sig: Take 1 tablet (800 mg) by mouth 3 times daily   montelukast (SINGULAIR) 10 MG tablet  Self Yes No   Sig: Take 1 tablet (10 mg) by mouth At Bedtime   multivitamin w/minerals (THERA-VIT-M) tablet  Self No No   Sig: Take 1 tablet by  "mouth daily   mycophenolate (GENERIC EQUIVALENT) 250 MG capsule  Self No No   Sig: Take 6 capsules (1,500 mg) by mouth 2 times daily   omeprazole (PRILOSEC) 40 MG DR capsule  Self Yes No   Sig: Take 1 capsule (40 mg) by mouth daily   ondansetron (ZOFRAN-ODT) 4 MG ODT tab   Yes No   Sig: DISSOLVE 1 TABLET ON THE TONGUE EVERY EIGHT HOURS AS NEEDED   polyethylene glycol (MIRALAX/GLYCOLAX) powder  Self Yes No   Sig: Take 1 capful by mouth daily   rosuvastatin (CRESTOR) 10 MG tablet  Self No No   Sig: Take 1 tablet (10 mg) by mouth daily   senna (SENOKOT) 8.6 MG tablet  Self Yes No   Sig: Take 2 tablets by mouth 2 times daily as needed for constipation   tacrolimus (GENERIC EQUIVALENT) 0.5 MG capsule  Self No No   Sig: Pt currently not taking.   tacrolimus (GENERIC EQUIVALENT) 1 MG capsule  Self No No   Sig: Take 4 capsules (4 mg) by mouth 2 times daily   valGANciclovir (VALCYTE) 450 MG tablet   No No   Sig: Take 1 tablet (450 mg) by mouth daily   vancomycin (VANCOCIN) 125 MG capsule   No No   Sig: Take 1 capsule (125 mg) by mouth 4 times daily for 10 days      Facility-Administered Medications: None             Review of Systems:   A complete, 10 point ROS was performed and is negative other than what is stated in the HPI.         Physical Exam:   Blood pressure 128/87, pulse 108, temperature 97.7  F (36.5  C), temperature source Oral, resp. rate 18, height 1.727 m (5' 8\"), weight 55.3 kg (122 lb), SpO2 100 %.     Gen: lying in bed, comfortable  HEENT: No scleral icterus, Moist mucous membranes. No oropharyngeal erythema.  CV: tachycardic; 2/6 systolic murmur, no rubs or gallops   Resp: normal breath sounds, no distress  Abdomen: normal bowel sounds, mild diffuse tenderness, abdomen is soft  Extremities: No peripheral edema, warm  Skin: sternotomy scar across torso   Neuro: awake and alert, grossly non-focal   Chest wall: focal tenderness and nodular swelling under left nipple (approximately 1-2 cm in diameter), no " significant erythema, drainage, nor warmth         Data:   Labs and Imaging Reviewed in Chart.     CXR (4/29/2019):  1. New nodular opacity overlying the right upper lobe, potentially external to patient. Consider follow-up chest x-ray versus CT if clinically indicated. Not present on recent chest x-ray and therefore pneumonia is prime consideration.  2. Decreased right upper lobe atelectasis or infection.  3. Postoperative changes from cardiac transplantation with stable heart size.     TTE (5/2019):  Interpretation Summary  Limited TTE to evaluate LV function.  Left ventricular function, chamber size, wall motion, and wall thickness are normal.The EF is 55-60%. No regional wall motion abnormalities are seen.  Global right ventricular function is mildly reduced. Mild right ventricular dilation is present.  Mild to moderate tricuspid insufficiency is present.   The inferior vena cava cannot be assessed.   No pericardial effusion is present.  Compared to prior TTE, LVEF has improved.

## 2019-09-24 NOTE — PROGRESS NOTES
Cardiology Progress Note    Assessment & Plan   54 year old male with a history of NICM s/p OHT (2/24/2019), paroxysmal atrial fibrillation, recent T12 compression fracture (4/21/2019), ulcerative colitis, Pierce's esophagus, hypothyroidism, GERD, BPH and depression who is admitted for further evaluation and management acute kidney injury in the setting of worsening CMV viremia and newly diagnosed clostridium difficile.     #Clostridium Difficile Colitis  #CMV Viremia  - transplant ID consultation  - continue valacyclovir 125 mg daily  - start PO vancomycin 125 mg QID     #Acute renal injury:- resolving  - intravenous fluids with treatment of the above     #Ulcerative colitis:   - gastroenterology consultation- appreciate input, no plan for scope at this time  - continue home mesalamine 800 mg TID     #Left breast pain / swelling under left nipple  - discussed with staff radiologist who agrees this area on CT appears a little more prominent compared to the right side, but does not appear concerning for abscess or other malignant pathology (no new suspicious lymph nodes, etc)  - awaiting ultrasound     #Nonischemic cardiomyopathy s/p OHT (2/24/2019)  - Most recent biopsy 8/13/19 1R (stable)  - Serostatus: CMV+, EBV+, HSV1+, HSV2 neg, VZV+  - Immunosuppresives:               - continue home Cellcept 1,500 mg BID              - hold Tacrolimus due to supra-therapeutic level (16) and FRANKLIN  - Tacrolimus level in AM ordered (goal 6 - 8 micrograms/L)  - PPx: none, therapeutic valacyclovir as above  - Diuretics: none  - ASA 81mg daily  - Rosuvastatin 10mg daily      #Hypothyroidism: continue levothyroxine, last TSH was low-recheck in AM  #GERD and history of Pierce's esophagus: continue omeprazole   #Depression: continue duloxetine   #Seasonal allergies: fluticasone, montelukast   #Insomnia: continue melatonin   #Hypertension: continue amlodipine 5 mg    Diet/IVF: general as tolerated  DVT ppx: heparin  "SQ  Disposition/Admission Status:2-3d  CODE: Full Code        Sena Mckeon Cam    Interval History   Still having frequent liquid bowel movements   SCr improving   Ultrasound showed gynecomastia      Physical Exam   Temp: 98.7  F (37.1  C) Temp src: Oral BP: 130/84 Pulse: 106   Resp: 18 SpO2: 100 % O2 Device: None (Room air)    Vitals:    09/23/19 1423 09/23/19 2054 09/24/19 0000   Weight: 55.3 kg (122 lb) 53.3 kg (117 lb 8.1 oz) 53.3 kg (117 lb 8.1 oz)     Vital Signs with Ranges  Temp:  [97.7  F (36.5  C)-98.7  F (37.1  C)] 98.7  F (37.1  C)  Pulse:  [106-115] 106  Resp:  [18] 18  BP: (112-134)/(52-87) 130/84  SpO2:  [99 %-100 %] 100 %  I/O last 3 completed shifts:  In: 3140 [P.O.:1140; IV Piggyback:2000]  Out: 1400 [Urine:950; Stool:450]     , Blood pressure 130/84, pulse 106, temperature 98.7  F (37.1  C), temperature source Oral, resp. rate 18, height 1.727 m (5' 8\"), weight 53.3 kg (117 lb 8.1 oz), SpO2 100 %.  117 lbs 8.08 oz  GEN:  Alert, oriented x 3, appears comfortable, NAD.  CV:  Regular rate and rhythm, no murmur or JVD.  S1 + S2 noted, no S3 or S4.  LUNGS:  Clear to auscultation bilaterally   ABD:  Soft but tender to palpation  EXT:  No edema or cyanosis.      Medications     - MEDICATION INSTRUCTIONS -       - MEDICATION INSTRUCTIONS -         amLODIPine  5 mg Oral Daily     aspirin  81 mg Oral Daily     calcium carbonate 600 mg-vitamin D 400 units  1 tablet Oral BID w/meals     DULoxetine  20 mg Oral Daily     heparin  5,000 Units Subcutaneous Q12H     levothyroxine  100 mcg Oral Daily     melatonin  6 mg Oral At Bedtime     mesalamine  800 mg Oral TID     montelukast  10 mg Oral At Bedtime     mycophenolate  1,500 mg Oral BID     omeprazole  40 mg Oral Daily     rosuvastatin  10 mg Oral Daily     sodium chloride (PF)  3 mL Intracatheter Q8H     valGANciclovir  450 mg Oral Daily     vancomycin  125 mg Oral 4x Daily       Data   Recent Labs   Lab 09/24/19  0437 09/24/19  0435 09/24/19  0035 " 09/23/19  2245 09/23/19  1534 09/17/19  1702   WBC 3.6*  --   --   --  4.6 3.7*   HGB 7.6*  --   --   --  10.2* 10.1*   MCV 82  --   --   --  82 85     --   --  242 290 284   INR  --   --   --   --  1.17*  --    NA  --  138 137  --  134  --    POTASSIUM  --  3.9 4.1  --  4.6  --    CHLORIDE  --  116* 112*  --  108  --    CO2  --  12* 12*  --  15*  --    BUN  --  40* 41*  --  47*  --    CR  --  1.78* 1.88*  --  2.41*  --    ANIONGAP  --  11 13  --  11  --    RADAMES  --  7.6* 7.9*  --  9.0  --    GLC  --  84 109*  --  96  --    ALBUMIN  --  2.9*  --   --  4.0  --    PROTTOTAL  --  5.4*  --   --  7.4  --    BILITOTAL  --  0.4  --   --  0.4  --    ALKPHOS  --  218*  --   --  301*  --    ALT  --  14  --   --  17  --    AST  --  11  --   --  16  --    LIPASE  --   --   --   --  151  --    TROPI  --   --   --   --  <0.015  --        Recent Results (from the past 24 hour(s))   CT Chest Abdomen Pelvis w/o Contrast    Narrative    EXAMINATION: CT CHEST ABDOMEN PELVIS W/O CONTRAST  9/23/2019 6:27 PM      CLINICAL HISTORY: increase ab pain with n/v/d hx of c diff and cmv  infection. patient with FRANKLIN also-- no contrast.    COMPARISON: Chest CT on 5/22/2019, CT cap on 4/29/2019        PROCEDURE COMMENTS: CT of the chest, abdomen, and pelvis was performed  without intravenous and oral contrast. Axial MIP  images of the chest,  and coronal and sagittal reformatted images of the chest, abdomen, and  pelvis obtained.    FINDINGS:      Chest:  The trachea and central airways are clear.  There is no mass or  infiltration. Residual left upper lobe 4 mm nodularity (image 52  series 4), previously measured 10 x 8 mm. No significant pulmonary  nodules. No pleural effusion or pneumothorax.    Unchanged asymmetric enlarged left thyroid lobe. Postoperative changes  of heart transplant. Stable positioning of left brachiocephalic vein  stent. There is no pericardial effusion.  Normal thoracic vasculature.  No significant mediastinal, hilum  or axillary lymphadenopathy.     Abdomen/pelvis:   Limited evaluation due to lack of IV contrast.    Liver: No focal mass. Unchanged multiple small hypoattenuating  lesions, too small to characterize.    Gallbladder: No radiopaque gallstones. No pericystic fluid.    Pancreas: No pancreatic mass or pancreatic duct dilatation.    Spleen: Not enlarged.    Adrenal glands: Within normal limits.    Urinary tract: Kidneys are normal in appearance. There is no evidence  for hydronephrosis or hydroureter. Urinary bladder is unremarkable.    Reproductive organs: Within normal limits.    GI system: No abnormally dilated small bowel or colon. No definite  bowel wall thickening.    Peritoneum/fluid: No ascites     Vessels: No aneurysmal dilatation of the abdominal aorta.  The portal,  splenic, and superior mesenteric veins are patent.  The origins of the  celiac and superior mesenteric arteries are patent.    Lymph nodes: No enlarged lymphadenopathy. Numerous subcentimeter  mesenteric, retroperitoneal lymph nodes.    Bones and the soft tissues: No aggressive osseous lesions. Stable T12  compression deformity with approximately 30% vertebral body height  loss..      Impression    IMPRESSION:  1.  Continued decrease in size of left upper lung lesion with residual  nodularity, previously measured 10 x 8 mm, suggestive of  infectious/inflammatory process. No new pulmonary lesions.  2.  Limited evaluation of abdomen and pelvis due to lack of IV  contrast. Nonspecific numerous subcentimeter mesenteric and  retroperitoneal lymph nodes, likely reactive.  3.  Resolution of ascites.  4.  Blood pool density lower than myocardium, compatible with  patient's known anemia.    I have personally reviewed the examination and initial interpretation  and I agree with the findings.    LAUREN RICHARD MD

## 2019-09-24 NOTE — PROGRESS NOTES
GASTROENTEROLOGY PROGRESS NOTE       Patient Summary   Everton Larios is a 54 year old male with a past medical history of ulcerative pancolitis diagnosed in 2005 (on Balsalazide), CMV viremia, C. Diff on 9/20/19, ASD s/p repair in childhood, NICM from congenital heart disease s/p OHT on 2/24/19, A. fib s/p ablation, immunosuppressed state (on tacrolimus, MMF and prednisone), Luisito's esophagus w/o dysplasia, asthma, osteoporosis, depression, and now being admitted for abdominal pain, nausea/vomiting and diarrhea.        ASSESSMENT AND RECOMMENDATIONS:   #C. Difficile  #Acute on chronic diarrhea   #Nausea and vomiting   #Abdominal pain  Patient had L-sided/RLQ abdominal pian, nausea/vomiting and diarrhea since mid August 2019, but symptoms have worsened since Friday 9/20/19.  Abdominal pain is cramping in nature, due to nausea/vomiting he has been throwing up most of oral intake, and stools have been over 10 brown-liquid stools/day without any blood as he would usually see with his previous ulcerative colitis flare.  He is afebrile and no leukocytosis, but needs to be taken into consideration his immunosuppression status.       Diarrhea, nausea and vomiting have improved, but patient is still having RLQ/LLQ/LUQ pain. Patient has leukopenia and normocytic anemia, likely r/t IST. FRANKLIN improved creatinine at 1.78. Albumin 2.9/protein 5.4 showing nutritional deficiency. Today patient is tolerating diet. Plan is to continue with C. Diff infection treatment.      #Ulcerative pancolitis:  Previoulsy quiescent since 2017 on Mesalamine monotherapy. CRP is <2.7, and no blood in stool.      #Elevated alkaline phosphatase:  Rising since 6/17/19, now 301. Patient needs further evaluation for PSC with MRCP given history of UC. Total bilirubin, AST/ALT remain normal. Will continue to monitor LFTs.      #History of Short-Segment Luisito's Esophagus w/o dysplasia:  Last EGD 2017 with Pierce's changes from 41-43cm from the  incisors, surveillance biopsies at that time negative for dysplasia. Next EGD is due in 2020. Will continue with PPI.      Recommendations  --Diet as tolerated  --Monitor vital signs, and trend WBC/LFTs  --Continue with 125mg of oral vancomycin X4/day for total of 10 days  --Avoid narcotics as these medications increase the risk of mortality in IBD patients  --Scheduled and as needed antiemetics  --Continue with home Mesalamine   --Patient will need DVT prophylaxis as IBD patients are in hypercoagulable state (on Heparin d/t coming in with FRANKLIN)   --Supportive care with IV hydration and optimizing electrolytes  --Discuss with infectious diseases team for the management of CMV viremia  --Accurate documentation of output (color, characteristics, and amount)      Gastroenterology follow up recommendations:  --GI will arrange a follow up appointment with Dr. Allen    Pt care plan discussed with Dr. Allen, GI staff physician. Thank you for involving us in this patient's care. Please do not hesitate to contact the GI service with any questions or concerns.    TAI Urias [facility]  Department of Gastroenterology   P: [phone]  Subjective\events within the 24 hours:   Patient is still having L-side and RLQ abdominal pain, but nausea and vomiting is better this morning.     4 point ROS performed and negative unless noted above.           Medications:     Current Facility-Administered Medications   Medication     acetaminophen (TYLENOL) Suppository 650 mg     acetaminophen (TYLENOL) tablet 650 mg     albuterol (PROAIR HFA/PROVENTIL HFA/VENTOLIN HFA) 108 (90 Base) MCG/ACT inhaler 2 puff     amLODIPine (NORVASC) tablet 5 mg     aspirin (ASA) chewable tablet 81 mg     calcium carbonate 600 mg-vitamin D 400 units (CALTRATE) per tablet 1 tablet     DULoxetine (CYMBALTA) DR capsule 20 mg     fluticasone (FLONASE) 50 MCG/ACT spray 1 spray     heparin sodium injection 5,000 Units     levothyroxine (SYNTHROID/LEVOTHROID)  "tablet 100 mcg     lidocaine (LMX4) cream     lidocaine 1 % 0.1-1 mL     medication instruction     melatonin tablet 6 mg     mesalamine (ASACOL HD) EC tablet 800 mg     montelukast (SINGULAIR) tablet 10 mg     mycophenolate (GENERIC EQUIVALENT) capsule 1,500 mg     naloxone (NARCAN) injection 0.1-0.4 mg     nitroGLYcerin (NITROSTAT) sublingual tablet 0.4 mg     omeprazole (priLOSEC) CR capsule 40 mg     ondansetron (ZOFRAN) injection 4 mg     ondansetron (ZOFRAN-ODT) ODT tab 4 mg     Patient is NOT allergic to contrast dye OR was given pre-procedure oral steroids for treatment     rosuvastatin (CRESTOR) tablet 10 mg     sodium chloride (PF) 0.9% PF flush 3 mL     sodium chloride (PF) 0.9% PF flush 3 mL     valGANciclovir (VALCYTE) tablet 450 mg     vancomycin (FIRVANQ) oral solution 125 mg        Physical Exam   Blood pressure 103/61, pulse 106, temperature 98.5  F (36.9  C), temperature source Oral, resp. rate 19, height 1.727 m (5' 8\"), weight 53.3 kg (117 lb 8.1 oz), SpO2 98 %.    Constitutional: cooperative, pleasant, not dyspneic/diaphoretic, no acute distress  Eyes: Sclera anicteric/injected  Ears/nose/mouth/throat: Normal oropharynx without ulcers or exudate, mucus membranes moist, hearing intact  Neck: supple, thyroid normal size  CV: RRR. No edema in LE bilaterally   Respiratory: Unlabored breathing. Slightly diminished lower lobes bilaterally  Lymph: No axillary, submandibular, supraclavicular or inguinal lymphadenopathy  Abd: Nondistended, +bs, no hepatosplenomegaly, nontender, no peritoneal signs  Skin: warm, perfused, no jaundice  Neuro: AAO x 3, No asterixis  Psych: Normal affect  MSK: No gross deformities     Data   Current Labs  CBC  Recent Labs   Lab 09/24/19  0437 09/23/19  2245 09/23/19  1534 09/17/19  1702   WBC 3.6*  --  4.6 3.7*   RBC 2.95*  --  3.97* 3.87*   HGB 7.6*  --  10.2* 10.1*   HCT 24.2*  --  32.7* 32.8*   MCV 82  --  82 85   MCH 25.8*  --  25.7* 26.1*   MCHC 31.4*  --  31.2* 30.8* "   RDW 14.0  --  13.9 14.0    242 290 284     BMP  Recent Labs   Lab 09/24/19  0435 09/24/19  0035 09/23/19  1534    137 134   POTASSIUM 3.9 4.1 4.6   CHLORIDE 116* 112* 108   CO2 12* 12* 15*   ANIONGAP 11 13 11   GLC 84 109* 96   BUN 40* 41* 47*   CR 1.78* 1.88* 2.41*   GFRESTIMATED 42* 39* 29*   GFRESTBLACK 48* 45* 33*   RADAMES 7.6* 7.9* 9.0   MAG  --   --  2.0   PHOS  --   --  4.8*      INR  Recent Labs   Lab 09/23/19  1534   INR 1.17*     Liver panel  Recent Labs   Lab 09/24/19  0435 09/23/19  1534   PROTTOTAL 5.4* 7.4   ALBUMIN 2.9* 4.0   BILITOTAL 0.4 0.4   ALKPHOS 218* 301*   AST 11 16   ALT 14 17                 History of Present Illness:   Everton Larios is a 54 year old male with a history of ulcerative pancolitis diagnosed in 2005 (on Balsalazide), CMV viremia, C. Diff on 9/20/19, ASD s/p repair in childhood, NICM from congenital heart disease s/p OHT on 2/24/19, immunosuppressed state (on tacrolimus, MMF and prednisone), Luisito's esophagus w/o dysplasia, osteoporosis and now being admitted for abdominal pain, nausea/vomiting and diarrhea.      Patient had L-sided/RLQ abdominal pian, nausea/vomiting and diarrhea since mid August 2019, but symptoms have worsened since Friday 9/20/19.  Abdominal pain is cramping in nature, due to nausea/vomiting he has been throwing up most of oral intake without blood/coffee ground emesis, and stools have been over 10 brown-liquid stools/day without any blood as he would usually see with his previous ulcerative colitis flare.  Occasionally he was able to keep bites of sandwich and liquid but not much.  Patient noted having chills since transplant which has not been worse than before.  He denies fever or body aches.  He reports having cough with white sputum for approximately 2 weeks.  He supposed to get oral vancomycin on Sunday but pharmacy was closed.  Today he called the clinic to notify how he is feeling and he was directed to come to the emergency  department.  No NSAID, tobacco, or alcohol. Patient denies recent travel.use or being exposed to sick people besides the multiple hospitalization that he had recently.

## 2019-09-24 NOTE — PLAN OF CARE
Neuro: A&Ox4. Call light with reach.  Cardiac: ST/SR . VSS.   Respiratory: Sating 95%> on RA.  GI/: Adequate urine output. BM liquid stool.  Diet/appetite: Tolerating regular diet. Eating ok.  Activity:  Assist of *, up to chair and in halls.  Pain: At acceptable level on current regimen.   Skin: No new deficits noted.      Plan: Continue with POC. Notify primary team with changes.

## 2019-09-24 NOTE — PROGRESS NOTES
"CLINICAL NUTRITION SERVICES - ASSESSMENT NOTE     Nutrition Prescription    RECOMMENDATIONS FOR MDs/PROVIDERS TO ORDER:  1. If pt unable to consisntently PO >1200 kcal and 40 gm protein daily (as indicated by calorie counts 9/25-9/27), recommend placing a feeding tube and starting enteral nutrition if pt agreeable to avoid further weight loss. Pt with severe weight loss over the past year, and current BMI status now underweight (and 76% IBW).      2. Recommend checking zinc d/t increased risk for deficiency with long-term diarrhea    3. Once pt eating, recommend ordering Thera-Vit-M daily      Malnutrition Status:    Severe malnutrition in the context of acute on chronic illness    Recommendations already ordered by Registered Dietitian (RD):  Calorie counts (9/25-9/27)  PRN supplements/snacks      Future/Additional Recommendations:  1. Monitor calorie counts and need for enteral nutrition  2. If enteral nutrition to start: recommend Peptamen 1.5 @ goal 50 ml/hr (1200 ml/day) to provide 1800 kcals (34 kcal/kg/day), 82 g PRO (1.5 g/kg/day), 924 ml free H2O, 67 g Fat (70% from MCTs), 226 g CHO and no Fiber daily.       REASON FOR ASSESSMENT  Everton Larios is a/an 54 year old male assessed by the dietitian for Admission Nutrition Risk Screen for unintentional loss of 10# or more in the past two months.    MEDICAL HISTORY  Per chart review, pt s/p heart txp (2/24/19), has a h/o ulcerative colitis, Pierce's esophagus, GERD, and is admitted for management of FRANKLIN and newly diagnosed c. Diff.    NUTRITION HISTORY   Per GI note this admit - \"Patient had L-sided/RLQ abdominal pian, nausea/vomiting and diarrhea since mid August 2019, but symptoms have worsened since Friday 9/20/19. Abdominal pain is cramping in nature, due to nausea/vomiting he has been throwing up most of oral intake without blood/coffee ground emesis, and stools have been over 10 brown-liquid stools/day without any blood as he would usually see with " "his previous ulcerative colitis flare. Occasionally he was able to keep bites of sandwich and liquid but not much.\"    Pt is known to this service. Following heart txp in Feb, pt had a feeding tube placed d/t poor oral intake. Pt initially received Nutren 1.5 at goal 55 mL/hr, then cycled to Nutren 1.5 at 80 mL/hr x 12h, then lastly was switched to Peptamen 1.5 at 80 mL/hr x 12h, a semi-elemental formula d/t ongoing poor tolerance to Nutren 1.5.    Per discussion with pt, the only food he has been able to tolerate recently is peanut butter sandwiches. Offered to schedule snack order of this, pt declined at this time. Pt states he has had Boost Plus in the past and was not able to tolerate the taste.     CURRENT NUTRITION ORDERS  Diet: Regular  Intake/Tolerance: Pt states he has no appetite and doesn't \"want to eat anything right now.\"     LABS  Labs reviewed  Phos 4.8 (H) on 9/23 - 2/2 FRANKLIN   K+ WNL on 9/24  Mg++ WNL on 9/23    MEDICATIONS  Medications reviewed  Caltrate BID  Omeprazole  Immunosuppression - mycophenolate     ANTHROPOMETRICS  Height: 172.7 cm (5' 8\")  Most Recent Weight: 53.3 kg (117 lb 8.1 oz)    IBW: 70 kg (76% IBW)  BMI: Underweight BMI <18.5  Weight History: Pt has lost 15 lbs (11.4% of his body wt) in the past 6 weeks. Weight has been trending down over the past year. In total pt has lost ~42 lbs (26.5% of his body wt) since Oct of 2018. This is severe clinical weight loss.  Wt Readings from Last 30 Encounters:   09/24/19 53.3 kg (117 lb 8.1 oz)   09/17/19 56.2 kg (123 lb 14.4 oz)   09/17/19 56.3 kg (124 lb 3.2 oz)   08/13/19 60.1 kg (132 lb 9.6 oz)   07/17/19 60.1 kg (132 lb 9.6 oz)   06/18/19 58.5 kg (129 lb)   06/04/19 55.8 kg (123 lb)   05/28/19 60.6 kg (133 lb 8 oz)   05/26/19 57.2 kg (126 lb 1.6 oz)   05/24/19 59.6 kg (131 lb 6.4 oz)   05/15/19 55 kg (121 lb 4.8 oz)   05/07/19 56 kg (123 lb 6.4 oz)   04/26/19 60.6 kg (133 lb 8 oz)   04/12/19 53.2 kg (117 lb 4.8 oz)   04/03/19 53.8 kg (118 " lb 11.2 oz)   01/30/19 65 kg (143 lb 4.8 oz)   01/14/19 63 kg (138 lb 12.8 oz)   01/02/19 63.9 kg (140 lb 14.4 oz)   12/19/18 64.4 kg (142 lb)   11/28/18 63.5 kg (140 lb)   11/23/18 58.7 kg (129 lb 6.4 oz)   11/15/18 60.7 kg (133 lb 14.4 oz)   11/02/18 65.8 kg (145 lb)   10/11/18 72.5 kg (159 lb 12.8 oz)     Dosing Weight: 53 kg (actutal, 53.3 kg on 9/24)    ASSESSED NUTRITION NEEDS  Estimated Energy Needs: 8005-1848 kcals/day (30 - 35 kcals/kg )  Justification: Repletion and Underweight  Estimated Protein Needs: 64-80 grams protein/day (1.2-1.5 grams of pro/kg)  Justification: Repletion  Estimated Fluid Needs:(1 mL/kcal)   Justification: Maintenance ( or Per provider pending fluid status)    PHYSICAL FINDINGS  See malnutrition section below.    MALNUTRITION  % Intake: </=75% for >/= 1 month (severe)  % Weight Loss: > 20% in 1 year (severe)  Subcutaneous Fat Loss: Facial region, Upper arm and Lower arm:  Moderate-severe  Muscle Loss: Temporal, Facial & jaw region: Moderate; Scapular bone, Thoracic region (clavicle, acromium bone, deltoid, trapezius, pectoral), Upper arm (bicep, tricep) and Lower arm  (forearm):  Severe - unable to assess lower extremities.   Fluid Accumulation/Edema: None noted  Malnutrition Diagnosis: Severe malnutrition in the context of acute on chronic illness    NUTRITION DIAGNOSIS  Inadequate oral intake related to N/V/D and subsequent poor appetite as evidenced by unintended  Weight loss of 15 lbs (11.4% of his body wt) in the past 6 weeks, and moderate-severe muscle and fat depletion.       INTERVENTIONS  Implementation  1. Nutrition Education: Provided education on low fiber foods that may be more easily tolerated, snack and supplement options available to pt while admitted. Discussed weight loss and need to optimize nutritional status. Pt declined scheduling snacks/supplements at this time. Discussed ability to order PRN.     2. Medical food supplement therapy - PRN  3. Calorie counts       Goals  1. Patient to consume % of nutritionally adequate meal trays TID, or the equivalent with supplements/snacks.  2. Weight maintenance above 53 kg.      Monitoring/Evaluation  Progress toward goals will be monitored and evaluated per protocol.    Gosia Hinds RD, LD  6B Pager: [phone]

## 2019-09-24 NOTE — PLAN OF CARE
Neuro: A&Ox4.   Cardiac: SR-ST . VSS.   Respiratory: Sating >92% on RA.  GI/: Adequate urine output. Multiple watery BMs.   Diet/appetite: Tolerating regular diet without caffeine. Fair appetite.  Activity:  Independent.   Pain: At acceptable level on current regimen.   Skin: No new deficits noted.  LDA's: R PIV SL.     Plan: Continue with POC. Notify primary team with changes. US of L chest completed.

## 2019-09-24 NOTE — ED PROVIDER NOTES
History     Chief Complaint   Patient presents with     Abdominal Pain     Diarrhea     HPI  Everton Larios is a 54 year old male who presents emergency room with ongoing abdominal pain nausea vomiting with diarrhea recent diagnosis of CMV and C. difficile.  Patient history of heart transplant February this year.  Patient notes increasing fatigue etc.  No blood in stool with this.  No reports hematemesis no reports of fever but ongoing cough and chest tightness also noted.  Now presents for evaluation needed follow-up with GI last week.  He was recently diagnosed with C. difficile along with CMV has not been treated yet.    I have reviewed the Medications, Allergies, Past Medical and Surgical History, and Social History in the Epic system.    Review of Systems   Constitutional: Positive for activity change, appetite change and fatigue. Negative for fever.   HENT: Negative for congestion, rhinorrhea, sinus pressure, sinus pain and trouble swallowing.    Eyes: Negative for redness and visual disturbance.   Respiratory: Positive for chest tightness (intermittenrt with cough). Negative for shortness of breath and wheezing.    Cardiovascular: Negative for chest pain, palpitations and leg swelling.   Gastrointestinal: Positive for abdominal pain, diarrhea, nausea and vomiting. Negative for blood in stool.   Genitourinary: Negative for difficulty urinating and dysuria.   Musculoskeletal: Negative for arthralgias, back pain, gait problem, joint swelling and neck stiffness.   Skin: Negative for color change, rash and wound.   Allergic/Immunologic: Positive for immunocompromised state (heart tx).   Neurological: Positive for weakness and light-headedness. Negative for syncope and headaches.   Psychiatric/Behavioral: Positive for decreased concentration and dysphoric mood. Negative for confusion and hallucinations.   All other systems reviewed and are negative.      Physical Exam   BP: 118/52  Pulse: 115  Temp: 97.7  F  "(36.5  C)  Resp: 18  Height: 172.7 cm (5' 8\")  Weight: 55.3 kg (122 lb)  SpO2: 100 %      Physical Exam  Vitals signs and nursing note reviewed.   Constitutional:       General: He is in acute distress.      Appearance: He is well-developed. He is not toxic-appearing or diaphoretic.      Comments: Patient able to give hx mild discomfort gives extensive hx   HENT:      Head: Normocephalic and atraumatic.      Nose: Nose normal.   Eyes:      General: No scleral icterus.     Extraocular Movements: Extraocular movements intact.      Pupils: Pupils are equal, round, and reactive to light.   Neck:      Musculoskeletal: Normal range of motion and neck supple.   Cardiovascular:      Rate and Rhythm: Normal rate and regular rhythm.   Pulmonary:      Effort: No respiratory distress.      Breath sounds: Normal breath sounds. No wheezing.   Abdominal:      General: There is no distension.      Palpations: There is no mass.      Tenderness: There is tenderness.      Hernia: No hernia is present.   Musculoskeletal:         General: No swelling or deformity.   Skin:     General: Skin is warm and dry.      Capillary Refill: Capillary refill takes less than 2 seconds.      Findings: No rash.   Neurological:      General: No focal deficit present.      Mental Status: He is alert and oriented to person, place, and time.   Psychiatric:      Comments: Flat but appropriate         ED Course        Procedures         Patient evaluated here in the ER.  IV was established.  Patient did receive a liter normal saline bolus in the ER.  EKG shows sinus tachycardia 107.  Labs noted increase in his creatinine noted 2.41.  Other labs stable.  Patient seen by GI also down here in the ER.  With his ongoing symptoms etc.  Any worsening renal dysfunction etc. discussed with cardiology.  We did order CT scan of chest abdomen pelvis without contrast.  Findings noted below.  Patient to be admitted to the cardiology service for further management of " recent heart transplant with abdominal pain nausea vomiting diarrhea dehydration FRANKLIN etc.  Also CMV and C. difficile recent diagnosis with initiation of management.       EKG Interpretation:      Interpreted by Bairon Hope MD  Time reviewed: 1603  Symptoms at time of EKG: nausea vomiting heart tx hx   Rhythm: normal sinus tach   Rate: normal 107  Axis: normal  Ectopy: none  Conduction: RBBB  ST Segments/ T Waves: No ST-T wave changes  Q Waves: none  Comparison to prior: No old EKG available    Clinical Impression:sinus tach          Critical Care time:  none             Labs Ordered and Resulted from Time of ED Arrival Up to the Time of Departure from the ED   CBC WITH PLATELETS DIFFERENTIAL - Abnormal; Notable for the following components:       Result Value    RBC Count 3.97 (*)     Hemoglobin 10.2 (*)     Hematocrit 32.7 (*)     MCH 25.7 (*)     MCHC 31.2 (*)     All other components within normal limits   INR - Abnormal; Notable for the following components:    INR 1.17 (*)     All other components within normal limits   COMPREHENSIVE METABOLIC PANEL - Abnormal; Notable for the following components:    Carbon Dioxide 15 (*)     Urea Nitrogen 47 (*)     Creatinine 2.41 (*)     GFR Estimate 29 (*)     GFR Estimate If Black 33 (*)     Alkaline Phosphatase 301 (*)     All other components within normal limits   PARTIAL THROMBOPLASTIN TIME   LIPASE   LACTIC ACID WHOLE BLOOD   TROPONIN I   NT PROBNP INPATIENT   CRP INFLAMMATION   ERYTHROCYTE SEDIMENTATION RATE AUTO   PERIPHERAL IV CATHETER   PULSE OXIMETRY NURSING   BLOOD CULTURE     Results for orders placed or performed during the hospital encounter of 09/23/19   CT Chest Abdomen Pelvis w/o Contrast    Narrative    EXAMINATION: CT CHEST ABDOMEN PELVIS W/O CONTRAST  9/23/2019 6:27 PM      CLINICAL HISTORY: increase ab pain with n/v/d hx of c diff and cmv  infection. patient with FRANKLIN also-- no contrast.    COMPARISON: Chest CT on 5/22/2019, CT cap on 4/29/2019         PROCEDURE COMMENTS: CT of the chest, abdomen, and pelvis was performed  without intravenous and oral contrast. Axial MIP  images of the chest,  and coronal and sagittal reformatted images of the chest, abdomen, and  pelvis obtained.    FINDINGS:      Chest:  The trachea and central airways are clear.  There is no mass or  infiltration. Residual left upper lobe 4 mm nodularity (image 52  series 4), previously measured 10 x 8 mm. No significant pulmonary  nodules. No pleural effusion or pneumothorax.    Unchanged asymmetric enlarged left thyroid lobe. Postoperative changes  of heart transplant. Stable positioning of left brachiocephalic vein  stent. There is no pericardial effusion.  Normal thoracic vasculature.  No significant mediastinal, hilum or axillary lymphadenopathy.     Abdomen/pelvis:   Limited evaluation due to lack of IV contrast.    Liver: No focal mass. Unchanged multiple small hypoattenuating  lesions, too small to characterize.    Gallbladder: No radiopaque gallstones. No pericystic fluid.    Pancreas: No pancreatic mass or pancreatic duct dilatation.    Spleen: Not enlarged.    Adrenal glands: Within normal limits.    Urinary tract: Kidneys are normal in appearance. There is no evidence  for hydronephrosis or hydroureter. Urinary bladder is unremarkable.    Reproductive organs: Within normal limits.    GI system: No abnormally dilated small bowel or colon. No definite  bowel wall thickening.    Peritoneum/fluid: No ascites     Vessels: No aneurysmal dilatation of the abdominal aorta.  The portal,  splenic, and superior mesenteric veins are patent.  The origins of the  celiac and superior mesenteric arteries are patent.    Lymph nodes: No enlarged lymphadenopathy. Numerous subcentimeter  mesenteric, retroperitoneal lymph nodes.    Bones and the soft tissues: No aggressive osseous lesions. Stable T12  compression deformity with approximately 30% vertebral body height  loss..      Impression     IMPRESSION:  1.  Continued decrease in size of left upper lung lesion with residual  nodularity, previously measured 10 x 8 mm, suggestive of  infectious/inflammatory process. No new pulmonary lesions.  2.  Limited evaluation of abdomen and pelvis due to lack of IV  contrast. Nonspecific numerous subcentimeter mesenteric and  retroperitoneal lymph nodes, likely reactive.  3.  Resolution of ascites.  4.  Blood pool density lower than myocardium, compatible with  patient's known anemia.    I have personally reviewed the examination and initial interpretation  and I agree with the findings.    LAUREN RICHARD MD   CBC with platelets differential   Result Value Ref Range    WBC 4.6 4.0 - 11.0 10e9/L    RBC Count 3.97 (L) 4.4 - 5.9 10e12/L    Hemoglobin 10.2 (L) 13.3 - 17.7 g/dL    Hematocrit 32.7 (L) 40.0 - 53.0 %    MCV 82 78 - 100 fl    MCH 25.7 (L) 26.5 - 33.0 pg    MCHC 31.2 (L) 31.5 - 36.5 g/dL    RDW 13.9 10.0 - 15.0 %    Platelet Count 290 150 - 450 10e9/L    Diff Method Automated Method     % Neutrophils 61.7 %    % Lymphocytes 21.6 %    % Monocytes 8.4 %    % Eosinophils 6.3 %    % Basophils 0.9 %    % Immature Granulocytes 1.1 %    Nucleated RBCs 0 0 /100    Absolute Neutrophil 2.9 1.6 - 8.3 10e9/L    Absolute Lymphocytes 1.0 0.8 - 5.3 10e9/L    Absolute Monocytes 0.4 0.0 - 1.3 10e9/L    Absolute Eosinophils 0.3 0.0 - 0.7 10e9/L    Absolute Basophils 0.0 0.0 - 0.2 10e9/L    Abs Immature Granulocytes 0.1 0 - 0.4 10e9/L    Absolute Nucleated RBC 0.0    INR   Result Value Ref Range    INR 1.17 (H) 0.86 - 1.14   Partial thromboplastin time   Result Value Ref Range    PTT 35 22 - 37 sec   Comprehensive metabolic panel   Result Value Ref Range    Sodium 134 133 - 144 mmol/L    Potassium 4.6 3.4 - 5.3 mmol/L    Chloride 108 94 - 109 mmol/L    Carbon Dioxide 15 (L) 20 - 32 mmol/L    Anion Gap 11 3 - 14 mmol/L    Glucose 96 70 - 99 mg/dL    Urea Nitrogen 47 (H) 7 - 30 mg/dL    Creatinine 2.41 (H) 0.66 - 1.25 mg/dL    GFR  Estimate 29 (L) >60 mL/min/[1.73_m2]    GFR Estimate If Black 33 (L) >60 mL/min/[1.73_m2]    Calcium 9.0 8.5 - 10.1 mg/dL    Bilirubin Total 0.4 0.2 - 1.3 mg/dL    Albumin 4.0 3.4 - 5.0 g/dL    Protein Total 7.4 6.8 - 8.8 g/dL    Alkaline Phosphatase 301 (H) 40 - 150 U/L    ALT 17 0 - 70 U/L    AST 16 0 - 45 U/L   Lipase   Result Value Ref Range    Lipase 151 73 - 393 U/L   Lactic acid whole blood   Result Value Ref Range    Lactic Acid 1.2 0.7 - 2.0 mmol/L   Troponin I   Result Value Ref Range    Troponin I ES <0.015 0.000 - 0.045 ug/L   Nt probnp inpatient (BNP)   Result Value Ref Range    N-Terminal Pro BNP Inpatient 767 0 - 900 pg/mL   CRP inflammation   Result Value Ref Range    CRP Inflammation 3.8 0.0 - 8.0 mg/L   Erythrocyte sedimentation rate auto   Result Value Ref Range    Sed Rate 17 0 - 20 mm/h   Platelet count   Result Value Ref Range    Platelet Count 242 150 - 450 10e9/L   Basic metabolic panel   Result Value Ref Range    Sodium 137 133 - 144 mmol/L    Potassium 4.1 3.4 - 5.3 mmol/L    Chloride 112 (H) 94 - 109 mmol/L    Carbon Dioxide 12 (L) 20 - 32 mmol/L    Anion Gap 13 3 - 14 mmol/L    Glucose 109 (H) 70 - 99 mg/dL    Urea Nitrogen 41 (H) 7 - 30 mg/dL    Creatinine 1.88 (H) 0.66 - 1.25 mg/dL    GFR Estimate 39 (L) >60 mL/min/[1.73_m2]    GFR Estimate If Black 45 (L) >60 mL/min/[1.73_m2]    Calcium 7.9 (L) 8.5 - 10.1 mg/dL   Magnesium   Result Value Ref Range    Magnesium 2.0 1.6 - 2.3 mg/dL   Phosphorus   Result Value Ref Range    Phosphorus 4.8 (H) 2.5 - 4.5 mg/dL   EKG 12-lead, tracing only   Result Value Ref Range    Interpretation ECG Click View Image link to view waveform and result    EKG 12-lead, tracing only   Result Value Ref Range    Interpretation ECG Click View Image link to view waveform and result             Assessments & Plan (with Medical Decision Making)  54-year-old male heart transplant February this year presents now with abdominal pain nausea vomiting diarrhea weakness FRANKLIN  possibly prerenal along with recent diagnosis of C. difficile and CMV not treated yet.  Patient evaluated here in the ER appears stable mild tachycardia received IV fluids otherwise patient received Zofran for nausea with fentanyl for pain as needed.  Patient seen by GI also done in the ER discussed with cardiology CT scan of chest abdomen pelvis were also done.  Findings noted below.  Patient be admitted to cardiology service.         I have reviewed the nursing notes.    I have reviewed the findings, diagnosis, plan and need for follow up with the patient.    Current Discharge Medication List          Final diagnoses:   FRANKLIN (acute kidney injury) (H)   Transplanted heart (H)   C. difficile colitis   Nausea vomiting and diarrhea   Dehydration   Cytomegalovirus infection, unspecified cytomegaloviral infection type (H)       9/23/2019   [facility]     Bairon Hope MD  09/24/19 0140

## 2019-09-24 NOTE — TELEPHONE ENCOUNTER
----- Message from TAI English CNP sent at 9/24/2019 11:53 AM CDT -----  Regarding: Post-hospital follow up  Good morning,   I hope all is well. Please schedule this patient with Dr. Allen in 2-4 weeks for post hospitalization follow up.   Thanks  Marilee

## 2019-09-24 NOTE — PROGRESS NOTES
"Post Transplant Patient Social Work Assessment     Patient Name: Everton Larios  : 1965  Age: 54 year old  MRN: [id number]  Date of transplant: 2019    Patient known to me from follow up in the transplant program. Pt is being admitted from [facility] ED for worsening CMV and new diagnosis of cdiff .  Seen today to update assessment.      Presenting Information   Living Situation: Pt lives alone, in an apartment   Functional Status: Pt has been independent w/ ADLs   Cultural/Language/Spiritual Considerations: None     Support System  Primary Support Person Pt's mother and father  Other support:  Brother   Plan for support in immediate post-hospital period: Pt plans to return to his apartment alone.     Health Care Directive  Decision Maker: Pt   Alternate Decision Maker: Per ELIO pt's parents would be alternative decision makers.   Health Care Directive: Multiple discussions have been had in the past regarding HCD and pt has been given copies and has not completed.     Mental Health/Coping:   History of Mental Health: Depression-pt is currently on Cymbalta   History of Chemical Health: Pt with hx of ETOH use prior to transplant, but none since transplant.   Current status: Pt reports he has had increased symptoms of depression, in the last six months. Pt has had a complicated medical course following heart transplant. Pt has never been suicidal, but does express regrets today of going through with heart transplant.     Coping: Last week writer had received a donor letter for pt from the donor family. Writer had contacted pt and he declined to accept it at that time. We had further conversations today about how he is coping following transplant. Pt acknowledges that he is wanting to wait to read his donor letter because he is having a hard time \"being grateful\" when his post transplant course has been harder then he anticipated.    Pt had been eager to return to work, but since returning to work he has been " reassigned and pt is not happy about this.      Services Needed/Recommended: Pt would be open to counseling, but does not feel this is feasible right now for him as he already has many medical bills that he has been unable to pay.     Financial   Income: SSD, wages from his part-time job at [employer] (16hrs/wk).   Impact of transplant on income: There has been a significant impact and pt reports he is looking into filing bankruptcy.   Insurance and medication coverage: Yes   Financial concerns: Yes   Resources needed: Writer will look into medication PAP and [facility]    Discharge Plan   Patient and family discharge goal: To return home  Barriers to discharge: Medical Readiness     Education provided by SW: Social Work role inpatient setting, availability of support groups, potential financial resources for medications and [facility]  Assessment and recommendations and plan:      Pt well known to writer and seen in the ED prior to transfer to the floor. Pt acknowledging feeling more depressed and discouraged following heart transplant at the end of February (2019). Through discussion pt agrees he is not experiencing an acute psychiatric crisis, but has more ongoing since heart transplant. Pt has had a complicated post-transplant course and has not met pt's expectations.     Pt is living alone and has been independent w/ ADLs. Pt has returned to work and is working 16hrs/wk. Pt has some significant financial stress and writer looking into financial assistance programs.     Pt anticipates to be able to return home when medically stable.

## 2019-09-24 NOTE — CONSULTS
[facility]    Transplant Infectious Diseases Inpatient Consultation      Everton Larios MRN# [id number]   YOB: 1965 Age: 54 year old   Date of Admission and time: 9/23/2019  3:27 PM     Reason for consult: I was asked by Dr. Young to evaluate this patient for CMV and CDI.             Recommendations:   1. Continue VGCV 450 mg daily (CrCl 34.9).   2. CMV PCR weekly, next PCR on 9/27/2019.   3. Please check CMP, CBC with diff while on VGCV.   4. Will discuss the need for shingrix vaccine and repeat 23-valent pneumovax when the patient's condition improves.         Summary of Presentation:   Transplants:  2/24/2019 (Heart), Postoperative day:  212     This patient is a 54 year old male with congenital heart disease s/p OHT in 2/2019 with methylprednisolone induction and TAC/MMF/prednisone for maintenance.   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     Admitted with persistent diarrhea, now with abdominal pain in the setting of a recent diagnosis of CDI and CMV viremia. Also with cough.         Active Problems and Infectious Diseases Issues:   1. CDI.   2. 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   In 7/2019 CMV was 3.5 log. It was repeated 9/17/2019 and resulted at 3.9 log. This replication rate is very slow which is reassuring. The CMV replication rate is not high probably due to CMV +/+ serostatus.   Though the diarrhea is likely due to CDI, I can not rule out CMV and/or UC component.   No blurry vision.     VGCV was started 9/20/2019.   Next CMV PCR 9/27/2019.     PO vancomycin was started 9/23/2019.       2. FRANKLIN   Likely due to diarrhea.   The Cr is improving.   Will continue to adjust the VGCV dose accordingly.     3. High alk phos  It is actually now improving.   Given hx of UC, GI were planning MRI of the biliary tract as OP. Will defer  management to GI colleagues.  Whether CMV is contributing to this high alk phos and now improving given initiating VGCV is also possible.      4. P aeruginosa BSI 4/29/2019 with P aeruginosa HCAP.   5. Abnormal CT chest with pulmonary nodules that are improving.   The P aeruginosa BSI and HCAP occurred in the setting of febrile neutropenia.   We treated him for severe pseudomonal pneumonia with 4 week course of ABx. On 5/28/2019 he concluded 4 weeks of cefepime from the first negative blood cx on 4/30/2019. Cefepime was used due to QTc prolongation prohibiting the use of fluoroquinolones.   Repeat CT chest 5/22/2019 showed significant improvement of pulmonary nodules. This improvement continues to be evident on CT done this admission on 9/24/2019 while off of ABx and antifungals.       Though the CT scan findings can be explained by P aeruginosa pneumonia, I was concerned about aspergillosis given the pulmonary nodules with GGO, low level of positive BD glucan at 109, and the neutropenia. Hence, I also treated the patient with micafungin starting 5/6/2019 till 6/3/2019 for total of 4 weeks.    Prolonged QTc and high costs prohibited the use of voriconazole/posaconazole and isavuconazole, respectively.          Old Problems and Infectious Diseases Issues:   1. Drug induced neutropenia resolved after stopping bactrim and VGCV.      Other Infectious Disease issues include:  - QTc 532 9/23/2019.   - PCP prophylaxis: off of bactrim since 4/26/2019. Was given pentamidine till 8/2019.   - Serostatus: CMV D+/R+, EBV D+/R+, HSV1+/2-, VZV +               VGCV was discontinued due to neutropenia. CMV PCR was not being checked weekly as recommended by ID in 5/2019. now on VGCV for induction therapy for CMV viremia.   - Immunization status: good candidate for shingrix. Also repeat 23-valent pneumovax sometime after 9/26/2019.   - Gamma globulin status: 358 5/1/2019.       Thank you very much Dr. Young for involving me in the  care of Mr. Everton Larios. Please do not hesitate to call me for any question.     Attestation:  I interviewed the patient and obtained history from the patient, and by reviewing the patient's chart including outside records, microbiological data, and radiological data. All data are summarized in this notes.  Ric Shukla MD   Pager: [phone]  09/24/2019             History of Present Illness:   Transplants:  2/24/2019 (Heart), Postoperative day:  212     This patient is a 54 year old male with congenital heart disease s/p OHT in 2/2019 with methylprednisolone induction and TAC/MMF/prednisone for maintenance.      The course was complicated by neutropenia late 4/2019. He was then admitted with fever, neutropenia dyspnea, and cough.   Was found to have multiple pulmonary nodules with GGO on chest CT. Blood cx on 4/29/2019 and BAL on 4/30/2019 grew P aeruginosa.   Treated with cefepime IV for 4 weeks.   Due to multiple risk factors for aspergillosis including neutropenia and positive BD glucan, he was also empirically treated with micafunin IV starting 5/6/2019.   We could not use azoles and fluoroquinolones due to prolonged QTc at baseline. Costs prohibited the use of isavuconazole.      Neutropenia resolved after the 2 doses of G-CSF on 5/1/2019 and 5/2/2019. Also after the discontinuation of VGCV and bactrim and decreasing the dose of MMF.      On 5/24/2019, TTE showed decrease EF. MMF dose was increased though right heart Bx did not show rejection.   Due to neutropenia and inability to use VGCV, ID recommended weekly CMV PCR in the setting of increasing MMF.   In July of 2019 his CMV VL was 3.5 log, it was repeated in 9/2019 and was 3.9 log.   I started him on VGCV adjusted to kidney function. The patient started VGCV on 9/20/2019.  He was also complaining of diarrhea and abdominal pain. I thought the abdominal pain was due to CMV. He was evaluated the same day by GI due to hx of UC. His C diff test  was positive.   Before he was able to start vancomycin PO, he needed to be admitted for worsening lower abdomina pain with few episodes of N/V and persistent diarrhea.  The diarrhea is watery and numerous. The abdominal pain is currently 6/10. No radiation, worse with oral intake and maybe bit better after passing gas and BM. No fever.  The patient is c/o dry cough. He states that everything got worse after receiving the influenza vaccine on 9/20/2019.                 Past Medical History:     Past Medical History:   Diagnosis Date     Alcohol abuse      Pierce's esophagus 10/4/2018     Chronic rhinitis 10/4/2018     Chronic systolic heart failure (H) 10/4/2018     Depression 10/4/2018     Diastolic dysfunction      DJD (degenerative joint disease) - neck 10/4/2018     H/O congenital atrial septal defect (ASD) repair at age 3 10/4/2018     Hypothyroidism due to medication 10/4/2018     ICD, Clinical Data 2008; gen change 2/2018 10/4/2018     Intermittent asthma without complication 10/4/2018     Nonischemic cardiomyopathy (H) 10/4/2018     On amiodarone therapy 10/4/2018     Paroxysmal atrial fibrillation (H) 10/4/2018     S/P ablation of atrial fibrillation 10/4/2018     Systolic heart failure (H)      Ulcerative colitis (H) 10/4/2018     Ventricular tachycardia (H) 10/4/2018             Past Surgical History:     Past Surgical History:   Procedure Laterality Date     AICD, DUAL CHAMBER       BRONCHOSCOPY (RIGID OR FLEXIBLE), DIAGNOSTIC N/A 4/30/2019    Procedure: BRONCHOSCOPY, WITH BAL;  Surgeon: Margot Chiang MD;  Location:  GI     CV HEART BIOPSY N/A 3/4/2019    Procedure: Heart Biopsy;  Surgeon: Abhijeet Titus MD;  Location:  HEART CARDIAC CATH LAB     CV HEART BIOPSY N/A 3/25/2019    Procedure: Heart Biopsy;  Surgeon: Yves Rodrigues MD;  Location:  HEART CARDIAC CATH LAB     CV HEART BIOPSY N/A 3/28/2019    Procedure: Heart Cath Heart Biopsy;  Surgeon: Yves Rodrigues MD;   [facility]     CV HEART BIOPSY N/A 4/10/2019    Procedure: CV HEART BIOPSY;  Surgeon: Isiah Mcallister MD;  Location: [facility]     CV HEART BIOPSY N/A 4/24/2019    Procedure: CV HEART BIOPSY;  Surgeon: Isiah Mcallister MD;  [facility]: [facility]     CV HEART BIOPSY N/A 5/8/2019    Procedure: CV HEART BIOPSY;  Surgeon: Isiah Mcallister MD;  Location: [facility]     CV HEART BIOPSY N/A 6/4/2019    Procedure: CV HEART BIOPSY;  Surgeon: Alton Solomon MD;  Location:  HEART CARDIAC CATH LAB     CV HEART BIOPSY N/A 5/22/2019    Procedure: CV HEART BIOPSY;  Surgeon: Yves Rodrigues MD;  Location:  HEART CARDIAC CATH LAB     CV HEART BIOPSY N/A 7/17/2019    Procedure: CV HEART BIOPSY;  Surgeon: Isiah Mcallister MD;  Location:  HEART CARDIAC CATH LAB     CV HEART BIOPSY N/A 8/13/2019    Procedure: CV HEART BIOPSY;  Surgeon: Jackson Patten MD;  Location:  HEART CARDIAC CATH LAB     CV INTRA-AORTIC BALLOON PUMP INSERTION N/A 2/18/2019    Procedure: Intra-Aortic Balloon;  Surgeon: Alton Solomon MD;  Location:  HEART CARDIAC CATH LAB     CV INTRA-AORTIC BALLOON PUMP INSERTION N/A 2/20/2019    Procedure: Replace subclavian IABP;  Surgeon: Yves Rodrigues MD;  Location:  HEART CARDIAC CATH LAB     CV LEFT HEART CATH N/A 3/19/2019    Procedure: Left Heart Cath;  Surgeon: Yves Rodrigues MD;  Location:  HEART CARDIAC CATH LAB     CV RIGHT HEART CATH N/A 3/19/2019    Procedure: RHC/HBx - Femoral access for 3/19 per Nimisha GONZALEZ;  Surgeon: Yves Rodrigues MD;  Location:  HEART CARDIAC CATH LAB     CV RIGHT HEART CATH N/A 3/25/2019    Procedure: Right Heart Cath;  Surgeon: Yves Rodrigues MD;  Location:  HEART CARDIAC CATH LAB     CV RIGHT HEART CATH N/A 3/28/2019    Procedure: Heart Cath Right Heart Cath;  Surgeon: Yves Rodrigues MD;  Location:  HEART CARDIAC CATH LAB     CV RIGHT  HEART CATH N/A 2019    Procedure: CV RIGHT HEART CATH;  Surgeon: Isiah Mcallister MD;  Location:  HEART CARDIAC CATH LAB     CV RIGHT HEART CATH N/A 3/11/2019    Procedure: ADD ON RHC/HBX;  Surgeon: Alton Solomon MD;  Location:  HEART CARDIAC CATH LAB     CV RIGHT HEART CATH N/A 2019    Procedure: CV RIGHT HEART CATH;  Surgeon: Alton Solomon MD;  Location:  HEART CARDIAC CATH LAB     CV RIGHT HEART CATH N/A 2019    Procedure: CV RIGHT HEART CATH;  Surgeon: Yves Rodrigues MD;  Location:  HEART CARDIAC CATH LAB     CV RIGHT HEART CATH N/A 2019    Procedure: CV RIGHT HEART CATH;  Surgeon: Jackson Patten MD;  Location:  HEART CARDIAC CATH LAB     INCISION AND DRAINAGE CHEST WASHOUT, COMBINED N/A 3/21/2019    Procedure: Incision And Drainage; Evacuation Right Chest Wall Hematoma;  Surgeon: Dewayne House MD;  [facility]     INSERT INTRAAORTIC BALLOON PUMP Left 2019    Procedure: Insert left  Subclavian Balloon Pump,  Removal Right femoral arterial balloom pump sheath;  Surgeon: Dewayne House MD;  [facility]     INSERT INTRAAORTIC BALLOON PUMP Left 2019    Procedure: SUBCLAVIAN BALLOON PUMP PLACEMENT;  Surgeon: Ben White MD;  [facility]     IR PICC PLACEMENT > 5 YRS OF AGE  2019     TRANSPLANT HEART RECIPIENT N/A 2019    Procedure: TRANSPLANT HEART RECIPIENT;  Surgeon: Dewayne House MD;  Location: [facility]               Social History:     Social History     Tobacco Use     Smoking status: Former Smoker     Packs/day: 0.00     Years: 10.00     Pack years: 0.00     Start date: 1980     Last attempt to quit: 2008     Years since quittin.7     Smokeless tobacco: Never Used   Substance Use Topics     Alcohol use: Not Currently     Alcohol/week: 1.0 standard drinks     Types: 1 Cans of beer per week             Family History:   I have reviewed this patient's family history  Family  History   Problem Relation Age of Onset     Endometrial Cancer Mother      Hypertension Father      Endometrial Cancer Maternal Grandmother              Immunizations:     Immunization History   Administered Date(s) Administered     Influenza (IIV3) PF 10/22/2008, 09/25/2009, 11/07/2011, 11/30/2012, 10/27/2013, 10/02/2014     Influenza Vaccine IM > 6 months Valent IIV4 10/02/2015     Influenza Vaccine, 3 YRS +, IM (QUADRIVALENT W/PRESERVATIVES) 09/12/2016, 10/16/2017, 09/20/2018     OPV, trivalent, live 10/20/1978     Pneumo Conj 13-V (2010&after) 09/26/2018     TDAP Vaccine (Boostrix) 07/20/2012     Td (Adult), Adsorbed 10/20/1978             Allergies:     Allergies   Allergen Reactions     Hydromorphone Other (See Comments)     Significant Delirium     Adhesive Tape Blisters     Tegaderm causes bruises, blisters and extreme irritation.     Lisinopril Other (See Comments)     hypotension     Quinolones Other (See Comments)     Would not rx quinolones until QTc interval improved.      Tobramycin Other (See Comments)     Would not use aminoglycosides as his kidney function is very borderline     Codeine Nausea             Medications:   Medications that Require Transfusion:     - MEDICATION INSTRUCTIONS -       - MEDICATION INSTRUCTIONS -         Scheduled Medications:     amLODIPine  5 mg Oral Daily     aspirin  81 mg Oral Daily     calcium carbonate 600 mg-vitamin D 400 units  1 tablet Oral BID w/meals     DULoxetine  20 mg Oral Daily     heparin  5,000 Units Subcutaneous Q12H     levothyroxine  100 mcg Oral Daily     melatonin  6 mg Oral At Bedtime     mesalamine  800 mg Oral TID     montelukast  10 mg Oral At Bedtime     mycophenolate  1,500 mg Oral BID     omeprazole  40 mg Oral Daily     rosuvastatin  10 mg Oral Daily     sodium chloride (PF)  3 mL Intracatheter Q8H     valGANciclovir  450 mg Oral Daily     vancomycin  125 mg Oral 4x Daily             Review of Systems:    As mentioned in the interim history  otherwise negative by reviewing constitutional symptoms, central and peripheral neurological systems, respiratory system, cardiac system, GI system,  system, musculoskeletal, skin, allergy, and lymphatics.                  Physical Exam:   Temp: 98.5  F (36.9  C) Temp src: Oral BP: 103/61 Pulse: 106 Heart Rate: 76 Resp: 19 SpO2: 98 % O2 Device: None (Room air)      Wt Readings from Last 4 Encounters:   09/24/19 53.3 kg (117 lb 8.1 oz)   09/17/19 56.2 kg (123 lb 14.4 oz)   09/17/19 56.3 kg (124 lb 3.2 oz)   08/13/19 60.1 kg (132 lb 9.6 oz)       Constitutional: awake, alert, cooperative, no apparent distress and appears at stated age, well nourished.   Head, ENT, Eyes, and Neck: Normocephalic, sinuses non-tender to palpation, external ears without lesions, moist buccal mucosa without oral thrush, tonsils without swelling, erythema, or exudate, no tenderness palpating teeth, good dentition, gums without necrosis or abscesses.   PERRL, EOMI, pink conjunctivae, non-icteric sclera.   Neck supple without rigidity, no cervical/axillary/inguinal LA bilaterally.    Neurologic: Patient is moving all extremities without focal deficit, no focal sensory loss.   Lungs: CTA bilaterally, no accessory muscle use, no dullness to percussion and no abnormal tactile fremitus.   CVS: RRR, normal S1/S2, no murmur, PMI was not displaced.   Abdomen: mild tenderness in the lower part of the abdomen, non-distended, no masses, no bruit, no shifting dullness, normal BS.   Extremities: no pitting edema of bilateral lower extremities, no ulcers, normal ROM of all joints, no swelling or erythema of any of joints and no tenderness to palpation.   Skin: no induration, fluctuation or discharge, and no rash            Data:   No results found for: ACD4    Inflammatory Markers    Recent Labs   Lab Test 09/24/19  0435 09/23/19  1534 09/17/19  1702 05/15/19  0940 05/02/19  0536 04/29/19  0911 11/15/18  0955 10/22/18  1239   SED  --  17 18  --  37*  --    --   --    CRP <2.9 3.8 <2.9 <2.9 69.0* 120.0* 6.1 12.5       Immune Globulin Studies     Recent Labs   Lab Test 09/17/19  1702 05/01/19  1850   IGG  --  358*   IGM  --  29*   IGE  --  38    106       Metabolic Studies       Recent Labs   Lab Test 09/24/19  0435 09/24/19  0035 09/23/19  1534 09/17/19  0856 08/13/19  1200 07/24/19  1013 07/17/19  1030  06/17/19  0926  04/29/19  0918  02/23/19  1835    137 134 136 138 132* 135   < > 137   < >  --    < > 133   POTASSIUM 3.9 4.1 4.6 4.1 4.6 4.3 4.2   < > 4.1   < >  --    < > 3.6   CHLORIDE 116* 112* 108 110* 106 103 103   < > 104   < >  --    < > 93*   CO2 12* 12* 15* 18* 22 22 24   < > 20   < >  --    < > 30   ANIONGAP 11 13 11 8 10 7 8   < > 13   < >  --    < > 10   BUN 40* 41* 47* 37* 32* 18 17   < > 27   < >  --    < > 28   CR 1.78* 1.88* 2.41* 2.02* 1.26* 1.10 1.07   < > 1.39*   < >  --    < > 1.03   GFRESTIMATED 42* 39* 29* 36* 63 74 77   < > 56*   < >  --    < > 81   GLC 84 109* 96 90 80 88 80   < > 80   < >  --    < > 114*   A1C  --   --   --   --   --   --   --   --   --   --   --   --  5.7*   RADAMES 7.6* 7.9* 9.0 9.1 9.2 8.6 8.9   < > 8.8   < >  --    < > 8.9   PHOS  --   --  4.8*  --   --   --  4.0  --  3.8   < >  --    < > 3.6   MAG  --   --  2.0  --   --   --  1.7  --  1.4*   < >  --    < > 2.0   LACT  --   --  1.2  --   --   --   --   --   --   --  1.7   < >  --    CKT  --   --   --   --   --   --  34  --  46   < >  --   --   --     < > = values in this interval not displayed.       Hepatic Studies    Recent Labs   Lab Test 09/24/19  0435 09/23/19  1534 09/17/19  1702 09/17/19  0856 07/24/19  1013 06/26/19  0936 06/17/19  0926  03/14/19  1653  03/10/19  0410   BILITOTAL 0.4 0.4  --  0.3 0.5 0.5 0.4   < >  --    < > 0.8   ALKPHOS 218* 301*  --  285* 268* 160* 135   < >  --    < > 104   ALBUMIN 2.9* 4.0  --  3.6 3.5 3.8 3.8   < >  --    < > 2.8*   AST 11 16  --  16 25 21 19   < >  --    < > 22   ALT 14 17  --  23 26 15 17   < >  --    < > 19   LDH   --   --   --   --   --   --   --   --  320*  --  276*   GGT  --   --  130*  --   --   --   --   --   --   --   --     < > = values in this interval not displayed.       Pancreatitis testing    Recent Labs   Lab Test 09/23/19  1534 09/17/19  1702 05/22/19  0926 04/29/19  0911 03/18/19  0505 02/23/19  1835 11/15/18  0955   AMYLASE  --   --   --   --  51 22*  --    LIPASE 151  --   --  42* 237  --   --    TRIG  --  166* 175*  --   --   --  112       Hematology Studies      Recent Labs   Lab Test 09/24/19  0437 09/23/19  2245 09/23/19  1534 09/17/19  1702 09/17/19  0856 08/13/19  1200 07/24/19  1013 07/17/19  1030   WBC 3.6*  --  4.6 3.7* 3.5* 3.6* 4.9 4.9   ANEU 1.9  --  2.9 2.0 1.9  --  2.6 2.7   ALYM 0.8  --  1.0 1.1 0.9  --  1.1 1.0   GRACE 0.4  --  0.4 0.4 0.5  --  0.7 0.6   AEOS 0.3  --  0.3 0.3 0.3  --  0.3 0.4   HGB 7.6*  --  10.2* 10.1* 9.5* 9.2* 9.3* 9.3*   HCT 24.2*  --  32.7* 32.8* 28.5* 30.3* 29.9* 31.0*    242 290 284 279 273 251 244       Clotting Studies    Recent Labs   Lab Test 09/23/19 1534 05/08/19 0504 05/07/19  0451 05/06/19  0438  03/14/19  1653  02/27/19  0421 02/25/19  1502   INR 1.17* 1.22* 1.33* 1.31*   < > 1.36*   < >  --  1.29*   PTT 35  --   --   --   --  46*  --  38* 32    < > = values in this interval not displayed.       Arterial Blood Gas Testing    Recent Labs   Lab Test 03/11/19  1152 03/10/19  1214 03/09/19  0400 03/08/19  2147  02/25/19  1717 02/25/19  1502 02/25/19  0918  02/25/19  0350 02/25/19  0110   PH  --   --   --   --   --  7.32* 7.31* 7.33*  --  7.39 7.35   PCO2  --   --   --   --   --  36 51* 46*  --  42 42   PO2  --   --   --   --   --  110* 75* 125*  --  87 170*   HCO3  --   --   --   --   --  19* 26 24  --  25 23   O2PER 21 21 21 21.0   < > 3L 2  2 40.0  40.0   < > 40.0  40.0 60.0    < > = values in this interval not displayed.        Urine Studies     Recent Labs   Lab Test 05/26/19  1006 04/29/19  1031 03/26/19  1707 03/17/19  0045 03/04/19  1303   URINEPH  6.5 6.0 5.5 5.5 5.0   NITRITE Negative Negative Negative Negative Negative   LEUKEST Negative Negative Negative Negative Negative   WBCU 0 <1 2 15* 0       Vancomycin Levels     Recent Labs   Lab Test 05/02/19  1519   VANCOMYCIN 10.0       Tacrolimus levels    Invalid input(s): TACROLIMUS, TAC, TACR  Transplant Immunosuppression Labs Latest Ref Rng & Units 9/24/2019 9/24/2019 9/24/2019 9/23/2019 9/17/2019   Tacro Level 5.0 - 15.0 ug/L - - - - -   Tacro Level - - - - - -   Creat 0.66 - 1.25 mg/dL - 1.78(H) 1.88(H) 2.41(H) -   BUN 7 - 30 mg/dL - 40(H) 41(H) 47(H) -   WBC 4.0 - 11.0 10e9/L 3.6(L) - - 4.6 3.7(L)   Neutrophil % 53.8 - - 61.7 52.3   ANEU 1.6 - 8.3 10e9/L 1.9 - - 2.9 2.0       Microbiology:  Blood cx negative to date.   Last check of C difficile  C Diff Toxin B PCR   Date Value Ref Range Status   09/20/2019 Positive (A) NEG^Negative Final     Comment:     Positive: Toxin producing C. difficile target DNA sequences detected, presumed   positive for C. difficile toxin B. C. difficile (Requires Enteric Isolation).      Clostridium difficile (Requires Enteric Isolation)  FDA approved assay performed using [employer] GeneXpert real-time PCR.         Virology:  CMV viral loads  No results found for: 35962, 84445, 02929, 76609  CMV viral loads    Recent Labs   Lab Test 09/17/19  0856 07/24/19  1013   CSPEC Plasma EDTA PLASMA   CMVLOG 3.9* 3.5*       CMV viral loads    Log IU/mL of CMVQNT   Date Value Ref Range Status   09/17/2019 3.9 (H) <2.1 [Log_IU]/mL Final   07/24/2019 3.5 (H) <2.1 [Log_IU]/mL Final   06/26/2019 <2.1 <2.1 [Log_IU]/mL Final   06/04/2019 Not Calculated <2.1 [Log_IU]/mL Final   05/24/2019 Not Calculated <2.1 [Log_IU]/mL Final   05/22/2019 Not Calculated <2.1 [Log_IU]/mL Final   05/15/2019 Not Calculated <2.1 [Log_IU]/mL Final   05/06/2019 Not Calculated <2.1 [Log_IU]/mL Final   04/30/2019 Not Calculated <2.1 [Log_IU]/mL Final   04/29/2019 Not Calculated <2.1 [Log_IU]/mL Final   04/26/2019 Not  Calculated <2.1 [Log_IU]/mL Final   04/24/2019 Not Calculated <2.1 [Log_IU]/mL Final   04/17/2019 Not Calculated <2.1 [Log_IU]/mL Final   04/08/2019 Not Calculated <2.1 [Log_IU]/mL Final   04/01/2019 Not Calculated <2.1 [Log_IU]/mL Final   03/25/2019 Not Calculated <2.1 [Log_IU]/mL Final   03/18/2019 Not Calculated <2.1 [Log_IU]/mL Final       CMV resistance testing  No lab results found.  No results found for: CMVCID, CMVFOS, CMVGAN     No results found for: H6RES    EBV DNA Copies/mL   Date Value Ref Range Status   09/17/2019 EBV DNA Not Detected EBVNEG^EBV DNA Not Detected [Copies]/mL Final   05/22/2019 541 (A) EBVNEG^EBV DNA Not Detected [Copies]/mL Final   04/29/2019 EBV DNA Not Detected EBVNEG^EBV DNA Not Detected [Copies]/mL Final   04/26/2019 EBV DNA Not Detected EBVNEG^EBV DNA Not Detected [Copies]/mL Final   03/27/2019 EBV DNA Not Detected EBVNEG^EBV DNA Not Detected [Copies]/mL Final       CMV Antibody IgG   Date Value Ref Range Status   02/23/2019 >8.0 (H) 0.0 - 0.8 AI Final     Comment:     Positive  Antibody index (AI) values reflect qualitative changes in antibody   concentration that cannot be directly associated with clinical condition or   disease state.     02/15/2019 >8.0 (H) 0.0 - 0.8 AI Final     Comment:     Positive  Antibody index (AI) values reflect qualitative changes in antibody   concentration that cannot be directly associated with clinical condition or   disease state.         Toxoplasma Antibody IgG   Date Value Ref Range Status   02/22/2019 <3.0 0.0 - 7.1 IU/mL Final     Comment:     Negative- Absence of detectable Toxoplasma gondii IgG antibodies. A negative   result does not rule out acute infection.  The magnitude of the measured result is not indicative of the amount of   antibody present. The concentrations of anti-Toxoplasma gondii IgG in a given   specimen determined with assays from different manufacturers can vary due to   differences in assay methods and reagent  specificity.         Imaging:  CT chest/abdomen/pelvis 9/23/2019   IMPRESSION:  1.  Continued decrease in size of left upper lung lesion with residual  nodularity, previously measured 10 x 8 mm, suggestive of  infectious/inflammatory process. No new pulmonary lesions.  2.  Limited evaluation of abdomen and pelvis due to lack of IV  contrast. Nonspecific numerous subcentimeter mesenteric and  retroperitoneal lymph nodes, likely reactive.  3.  Resolution of ascites.  4.  Blood pool density lower than myocardium, compatible with  patient's known anemia.        Ric Shukla MD  Pager: [phone]  09/24/2019

## 2019-09-25 LAB
ALBUMIN SERPL-MCNC: 3.1 G/DL (ref 3.4–5)
ALP SERPL-CCNC: 237 U/L (ref 40–150)
ALT SERPL W P-5'-P-CCNC: 15 U/L (ref 0–70)
ANION GAP SERPL CALCULATED.3IONS-SCNC: 8 MMOL/L (ref 3–14)
AST SERPL W P-5'-P-CCNC: 11 U/L (ref 0–45)
BILIRUB SERPL-MCNC: 0.3 MG/DL (ref 0.2–1.3)
BUN SERPL-MCNC: 26 MG/DL (ref 7–30)
CALCIUM SERPL-MCNC: 8.5 MG/DL (ref 8.5–10.1)
CHLORIDE SERPL-SCNC: 116 MMOL/L (ref 94–109)
CO2 SERPL-SCNC: 14 MMOL/L (ref 20–32)
CREAT SERPL-MCNC: 1.61 MG/DL (ref 0.66–1.25)
ERYTHROCYTE [DISTWIDTH] IN BLOOD BY AUTOMATED COUNT: 14.1 % (ref 10–15)
GFR SERPL CREATININE-BSD FRML MDRD: 47 ML/MIN/{1.73_M2}
GLUCOSE SERPL-MCNC: 90 MG/DL (ref 70–99)
HCT VFR BLD AUTO: 25.7 % (ref 40–53)
HGB BLD-MCNC: 8 G/DL (ref 13.3–17.7)
MCH RBC QN AUTO: 25.6 PG (ref 26.5–33)
MCHC RBC AUTO-ENTMCNC: 31.1 G/DL (ref 31.5–36.5)
MCV RBC AUTO: 82 FL (ref 78–100)
PLATELET # BLD AUTO: 228 10E9/L (ref 150–450)
POTASSIUM SERPL-SCNC: 4 MMOL/L (ref 3.4–5.3)
PROT SERPL-MCNC: 5.7 G/DL (ref 6.8–8.8)
RBC # BLD AUTO: 3.13 10E12/L (ref 4.4–5.9)
SODIUM SERPL-SCNC: 138 MMOL/L (ref 133–144)
WBC # BLD AUTO: 3.5 10E9/L (ref 4–11)

## 2019-09-25 PROCEDURE — 80053 COMPREHEN METABOLIC PANEL: CPT | Performed by: STUDENT IN AN ORGANIZED HEALTH CARE EDUCATION/TRAINING PROGRAM

## 2019-09-25 PROCEDURE — 25000131 ZZH RX MED GY IP 250 OP 636 PS 637: Performed by: STUDENT IN AN ORGANIZED HEALTH CARE EDUCATION/TRAINING PROGRAM

## 2019-09-25 PROCEDURE — 25000128 H RX IP 250 OP 636: Performed by: STUDENT IN AN ORGANIZED HEALTH CARE EDUCATION/TRAINING PROGRAM

## 2019-09-25 PROCEDURE — 25000132 ZZH RX MED GY IP 250 OP 250 PS 637: Performed by: STUDENT IN AN ORGANIZED HEALTH CARE EDUCATION/TRAINING PROGRAM

## 2019-09-25 PROCEDURE — 36415 COLL VENOUS BLD VENIPUNCTURE: CPT | Performed by: STUDENT IN AN ORGANIZED HEALTH CARE EDUCATION/TRAINING PROGRAM

## 2019-09-25 PROCEDURE — 25000131 ZZH RX MED GY IP 250 OP 636 PS 637: Performed by: INTERNAL MEDICINE

## 2019-09-25 PROCEDURE — 25000132 ZZH RX MED GY IP 250 OP 250 PS 637: Performed by: NURSE PRACTITIONER

## 2019-09-25 PROCEDURE — 12000012 ZZH R&B MS OVERFLOW UMMC

## 2019-09-25 PROCEDURE — 85027 COMPLETE CBC AUTOMATED: CPT | Performed by: STUDENT IN AN ORGANIZED HEALTH CARE EDUCATION/TRAINING PROGRAM

## 2019-09-25 PROCEDURE — 99232 SBSQ HOSP IP/OBS MODERATE 35: CPT | Mod: GC | Performed by: INTERNAL MEDICINE

## 2019-09-25 RX ORDER — MYCOPHENOLATE MOFETIL 250 MG/1
1000 CAPSULE ORAL 2 TIMES DAILY
Status: DISCONTINUED | OUTPATIENT
Start: 2019-09-25 | End: 2019-09-27

## 2019-09-25 RX ADMIN — ACETAMINOPHEN 650 MG: 325 TABLET, FILM COATED ORAL at 20:57

## 2019-09-25 RX ADMIN — AMLODIPINE BESYLATE 5 MG: 5 TABLET ORAL at 08:38

## 2019-09-25 RX ADMIN — LEVOTHYROXINE SODIUM 100 MCG: 100 TABLET ORAL at 08:38

## 2019-09-25 RX ADMIN — MYCOPHENOLATE MOFETIL 1000 MG: 250 CAPSULE ORAL at 20:57

## 2019-09-25 RX ADMIN — VANCOMYCIN HYDROCHLORIDE 125 MG: KIT at 21:04

## 2019-09-25 RX ADMIN — HEPARIN SODIUM 5000 UNITS: 5000 INJECTION, SOLUTION INTRAVENOUS; SUBCUTANEOUS at 08:38

## 2019-09-25 RX ADMIN — Medication 25 MG: at 01:14

## 2019-09-25 RX ADMIN — VANCOMYCIN HYDROCHLORIDE 125 MG: KIT at 15:55

## 2019-09-25 RX ADMIN — Medication 25 MG: at 21:48

## 2019-09-25 RX ADMIN — MESALAMINE 800 MG: 800 TABLET, DELAYED RELEASE ORAL at 13:59

## 2019-09-25 RX ADMIN — MELATONIN TAB 3 MG 6 MG: 3 TAB at 21:00

## 2019-09-25 RX ADMIN — ONDANSETRON 4 MG: 4 TABLET, ORALLY DISINTEGRATING ORAL at 01:14

## 2019-09-25 RX ADMIN — MESALAMINE 800 MG: 800 TABLET, DELAYED RELEASE ORAL at 08:38

## 2019-09-25 RX ADMIN — OMEPRAZOLE 40 MG: 20 CAPSULE, DELAYED RELEASE ORAL at 08:37

## 2019-09-25 RX ADMIN — TACROLIMUS 2 MG: 1 CAPSULE ORAL at 18:20

## 2019-09-25 RX ADMIN — VANCOMYCIN HYDROCHLORIDE 125 MG: KIT at 08:38

## 2019-09-25 RX ADMIN — VALGANCICLOVIR 450 MG: 450 TABLET, FILM COATED ORAL at 08:38

## 2019-09-25 RX ADMIN — Medication 1 TABLET: at 18:21

## 2019-09-25 RX ADMIN — TACROLIMUS 2 MG: 1 CAPSULE ORAL at 08:37

## 2019-09-25 RX ADMIN — HEPARIN SODIUM 5000 UNITS: 5000 INJECTION, SOLUTION INTRAVENOUS; SUBCUTANEOUS at 20:57

## 2019-09-25 RX ADMIN — ROSUVASTATIN CALCIUM 10 MG: 10 TABLET, FILM COATED ORAL at 08:37

## 2019-09-25 RX ADMIN — ACETAMINOPHEN 650 MG: 325 TABLET, FILM COATED ORAL at 15:58

## 2019-09-25 RX ADMIN — DULOXETINE HYDROCHLORIDE 20 MG: 20 CAPSULE, DELAYED RELEASE ORAL at 08:37

## 2019-09-25 RX ADMIN — Medication 1 TABLET: at 08:37

## 2019-09-25 RX ADMIN — MESALAMINE 800 MG: 800 TABLET, DELAYED RELEASE ORAL at 21:04

## 2019-09-25 RX ADMIN — Medication 25 MG: at 15:58

## 2019-09-25 RX ADMIN — ASPIRIN 81 MG CHEWABLE TABLET 81 MG: 81 TABLET CHEWABLE at 08:37

## 2019-09-25 RX ADMIN — VANCOMYCIN HYDROCHLORIDE 125 MG: KIT at 13:07

## 2019-09-25 RX ADMIN — MYCOPHENOLATE MOFETIL 1000 MG: 250 CAPSULE ORAL at 08:38

## 2019-09-25 RX ADMIN — MONTELUKAST 10 MG: 10 TABLET, FILM COATED ORAL at 21:00

## 2019-09-25 RX ADMIN — ACETAMINOPHEN 650 MG: 325 TABLET, FILM COATED ORAL at 01:14

## 2019-09-25 ASSESSMENT — ACTIVITIES OF DAILY LIVING (ADL)
ADLS_ACUITY_SCORE: 13
ADLS_ACUITY_SCORE: 11

## 2019-09-25 ASSESSMENT — PAIN DESCRIPTION - DESCRIPTORS
DESCRIPTORS: ACHING;DISCOMFORT
DESCRIPTORS: ACHING

## 2019-09-25 ASSESSMENT — MIFFLIN-ST. JEOR: SCORE: 1351.5

## 2019-09-25 NOTE — PLAN OF CARE
-Neuro: A&Ox4.   Cardiac: SR-ST . VSS.   Respiratory: Sating >92% on RA.  GI/: Adequate urine output. Multiple loose BMs, pt reports stools are getting less frequent.   Diet/appetite: Tolerating regular diet no caffeine. Poor appetite. Mynor counts.    Activity:  Independent.   Pain: Denies.    Skin: No new deficits noted.  LDA's: L PIV SL.     Plan: Continue with POC. Notify primary team with changes.

## 2019-09-25 NOTE — PROGRESS NOTES
Transplant Social Work Services Progress Note      Date of Initial Social Work Evaluation: 9/23/2019  Collaborated with: Pt, RNCC    Data: Heart Transplant  continuing to follow. Pt admitted 9/23/19 for worsening CMV and new dx of Cdiff. Pt seen on 6B. Pt independent in room. Anticipated discharge in a few homes and pt anticipated to return home.   Provided pt with [facility] information as he has financial concerns with bills he has received since transplant.     Intervention: Supportive Visit, Financial Resource   Assessment: Pt frustrated with admission and doesn't feel much better then day he was admitted. Pt appears motivated to contact the  [facility] to see if he qualifies for assistance.   Education provided by SW: Ongoing SW support   Plan:    Discharge Plans in Progress: Anticipate pt being able to return home    Barriers to d/c plan: Medical Readiness     Follow up Plan: SW to continue to follow.

## 2019-09-25 NOTE — PLAN OF CARE
Neuro: A&Ox4. Call light with reach.  Pt slept most of night.  Cardiac: SR-ST . VSS.             Respiratory: Sating >92% on RA.  GI/: Adequate urine output. Multiple watery liquid BMs.   Diet/appetite: Tolerating regular diet without caffeine. Fair appetite.  Activity:  Independent.   Pain: At acceptable level on current regimen.   Skin: No new deficits noted.  LDA's: Rt PIV

## 2019-09-25 NOTE — PROGRESS NOTES
Cardiology Progress Note    Assessment & Plan   54 year old male with a history of NICM s/p OHT (2/24/2019), paroxysmal atrial fibrillation, recent T12 compression fracture (4/21/2019), ulcerative colitis, Pierce's esophagus, hypothyroidism, GERD, BPH and depression who is admitted for further evaluation and management acute kidney injury in the setting of worsening CMV viremia and newly diagnosed clostridium difficile.    Plan for today  - continue valccyclovir and PO vanc  - still having significant BM, HCO3 still in the 10's SCr stable- will give gentle fluids today  -tacro restarted at 2 BID (home dose 4BID) level pending   - cyclosporine will be decreased to 1000 BID WBC stable at 3.5 if lower will discuss decreasing dose.      #Clostridium Difficile Colitis  #CMV Viremia  - transplant ID consultation  - continue valacyclovir 125 mg daily  - start PO vancomycin 125 mg QID     #Acute renal injury:- resolving     #Ulcerative colitis:   - gastroenterology consultation- appreciate input, no plan for scope at this time  - continue home mesalamine 800 mg TID     #Left breast pain / swelling under left nipple  - discussed with staff radiologist who agrees this area on CT appears a little more prominent compared to the right side, but does not appear concerning for abscess or other malignant pathology (no new suspicious lymph nodes, etc)  - awaiting ultrasound     #Nonischemic cardiomyopathy s/p OHT (2/24/2019)  - Most recent biopsy 8/13/19 1R (stable)  - Serostatus: CMV+, EBV+, HSV1+, HSV2 neg, VZV+  - Immunosuppresives:               -  home Cellcept 1,500 mg BID will be decreased to 1000 BID WBC stable at 3.5 if lower will discuss decreasing dose.               - Tacro level was 7 (probably true is slightly lower than this, goal is 6-8) started tacro 2mg BID 9/24  - PPx: none, therapeutic valacyclovir as above  - Diuretics: none  - ASA 81mg daily  - Rosuvastatin 10mg daily      #Hypothyroidism: continue  "levothyroxine, last TSH was low-recheck in AM  #GERD and history of Pierce's esophagus: continue omeprazole   #Depression: continue duloxetine   #Seasonal allergies: fluticasone, montelukast   #Insomnia: continue melatonin   #Hypertension: continue amlodipine 5 mg    Diet/IVF: general as tolerated  DVT ppx: heparin SQ  Disposition/Admission Status:2-3d  CODE: Full Code        Sena Mckeon Cam    Interval History   Frequent BM's  SCr stable WBC stable  Afebrile   KUB yesterday unremarkable      Physical Exam   Temp: 98  F (36.7  C) Temp src: Oral BP: 113/72 Pulse: 92 Heart Rate: 91 Resp: 16 SpO2: 97 % O2 Device: Nasal cannula Oxygen Delivery: 2 LPM  Vitals:    09/23/19 2054 09/24/19 0000 09/25/19 0100   Weight: 53.3 kg (117 lb 8.1 oz) 53.3 kg (117 lb 8.1 oz) 54.7 kg (120 lb 9.5 oz)     Vital Signs with Ranges  Temp:  [97.2  F (36.2  C)-98.7  F (37.1  C)] 98  F (36.7  C)  Pulse:  [] 92  Heart Rate:  [] 91  Resp:  [16-18] 16  BP: (113-131)/(72-91) 113/72  SpO2:  [97 %-100 %] 97 %  I/O last 3 completed shifts:  In: 450 [P.O.:450]  Out: 1100 [Urine:850; Stool:250]    Heart Rate: 91, Blood pressure 113/72, pulse 92, temperature 98  F (36.7  C), temperature source Oral, resp. rate 16, height 1.727 m (5' 8\"), weight 54.7 kg (120 lb 9.5 oz), SpO2 97 %.  120 lbs 9.47 oz  GEN:  Alert, oriented x 3, appears comfortable, NAD.  CV:  Regular rate and rhythm, tachy no murmur or JVD.  S1 + S2 noted, no S3 or S4.  LUNGS:  Clear to auscultation bilaterally   ABD:  Soft but tender to palpation  EXT:  No edema or cyanosis.      Medications     - MEDICATION INSTRUCTIONS -       - MEDICATION INSTRUCTIONS -         amLODIPine  5 mg Oral Daily     aspirin  81 mg Oral Daily     calcium carbonate 600 mg-vitamin D 400 units  1 tablet Oral BID w/meals     DULoxetine  20 mg Oral Daily     heparin  5,000 Units Subcutaneous Q12H     levothyroxine  100 mcg Oral Daily     melatonin  6 mg Oral At Bedtime     mesalamine  800 mg " Oral TID     montelukast  10 mg Oral At Bedtime     mycophenolate  1,000 mg Oral BID     omeprazole  40 mg Oral Daily     rosuvastatin  10 mg Oral Daily     sodium chloride (PF)  3 mL Intracatheter Q8H     tacrolimus  2 mg Oral BID IS     valGANciclovir  450 mg Oral Daily     vancomycin  125 mg Oral 4x Daily       Data   Recent Labs   Lab 09/25/19  0507 09/24/19  1947 09/24/19  0437 09/24/19  0435  09/23/19  2245 09/23/19  1534   WBC 3.5*  --  3.6*  --   --   --  4.6   HGB 8.0*  --  7.6*  --   --   --  10.2*   MCV 82  --  82  --   --   --  82     --  234  --   --  242 290   INR  --   --   --   --   --   --  1.17*    137  --  138   < >  --  134   POTASSIUM 4.0 3.9  --  3.9   < >  --  4.6   CHLORIDE 116* 114*  --  116*   < >  --  108   CO2 14* 15*  --  12*   < >  --  15*   BUN 26 30  --  40*   < >  --  47*   CR 1.61* 1.66*  --  1.78*   < >  --  2.41*   ANIONGAP 8 8  --  11   < >  --  11   RADAMES 8.5 8.1*  --  7.6*   < >  --  9.0   GLC 90 116*  --  84   < >  --  96   ALBUMIN 3.1*  --   --  2.9*  --   --  4.0   PROTTOTAL 5.7*  --   --  5.4*  --   --  7.4   BILITOTAL 0.3  --   --  0.4  --   --  0.4   ALKPHOS 237*  --   --  218*  --   --  301*   ALT 15  --   --  14  --   --  17   AST 11  --   --  11  --   --  16   LIPASE  --   --   --   --   --   --  151   TROPI  --   --   --   --   --   --  <0.015    < > = values in this interval not displayed.       Recent Results (from the past 24 hour(s))   US Breast Left Limited 1-3 Quadrants    Narrative    Examination: LEFT BREAST ULTRASOUND, 9/24/2019 8:52 AM     Comparison: CT chest abdomen and pelvis 9/23/2019.    History/Family History: Left breast lump located circumferentially  around the nipple noted by patient approximately one month ago.  Swelling under left breast nipple. Evaluate for left breast abscess.    Findings: In the retroareolar left breast there is moderate  gynecomastia, as seen on CT one day prior. No fluid collection  concerning for abscess.       Impression    IMPRESSION: BI-RADS CATEGORY: 2 - Benign. Gynecomastia.    RECOMMENDED FOLLOW-UP: Clinical follow-up.      I have personally reviewed the examination and initial interpretation  and I agree with the findings.    MADISON PEREZ MD   XR Abdomen Port 1 View    Narrative    XR ABDOMEN PORT 1 VW  9/24/2019 5:39 PM    History:  c diff.     Comparison: CT cap on 9/23/2019    Findings:   Portable supine AP abdominal radiograph. Nonobstructive bowel gas  pattern. No pneumatosis, portal venous gas. No acute osseous  abnormality. The visualized lung fields are clear.      Impression    IMPRESSION:  None obstructive bowel gas pattern without pneumatosis or portal  venous gas.    I have personally reviewed the examination and initial interpretation  and I agree with the findings.    MADISON PEREZ MD

## 2019-09-25 NOTE — PROGRESS NOTES
GASTROENTEROLOGY PROGRESS NOTE       Patient Summary   Everton Larios is a 54 year old male with a past medical history of ulcerative pancolitis diagnosed in 2005 (on Balsalazide), CMV viremia, C. Diff on 9/20/19, ASD s/p repair in childhood, NICM from congenital heart disease s/p OHT on 2/24/19, A. fib s/p ablation, immunosuppressed state (on tacrolimus, MMF and prednisone), Luisito's esophagus w/o dysplasia, asthma, osteoporosis, T12 compression fracture on 4/21/19, depression, and now being admitted for abdominal pain, nausea/vomiting and diarrhea.        ASSESSMENT AND RECOMMENDATIONS:   #C. Difficile  #Acute on chronic diarrhea   #Nausea and vomiting   #Abdominal pain  Patient had L-sided/RLQ abdominal pian, nausea/vomiting and diarrhea since mid August 2019, but symptoms have worsened since Friday 9/20/19.  Abdominal pain is cramping in nature, due to nausea/vomiting he has been throwing up most of oral intake, and stools have been over 10 brown-liquid stools/day without any blood as he would usually see with his previous ulcerative colitis flare.  He is afebrile and no leukocytosis, but needs to be taken into consideration his immunosuppression status.       Abdominal pain, nausea and vomiting have improved, but patient is still having diarrhea which is likely r/t C. Diff infection and oral antibiotics. Patient has leukopenia and normocytic anemia, likely r/t IST. FRANKLIN improved creatinine at 1.61. Albumin 3.1/protein 5.7 showing nutritional deficiency. Today patient is tolerating diet. Plan is to continue with C. Diff infection treatment.      #Ulcerative pancolitis:  Previoulsy quiescent since 2017 on Mesalamine monotherapy. CRP is <2.7, and no blood in stool.      #Elevated alkaline phosphatase:  Rising since 6/17/19, now 237. Patient needs further evaluation for PSC with MRCP given history of UC. Total bilirubin, AST/ALT remain normal. Will continue to monitor LFTs.      #History of Short-Segment  Luisito's Esophagus w/o dysplasia:  Last EGD 2017 with Pierce's changes from 41-43cm from the incisors, surveillance biopsies at that time negative for dysplasia. Next EGD is due in 2020. Will continue with PPI.      Recommendations  --Diet as tolerated  --Continue with 125mg of oral vancomycin X4/day for total of 10 days  --Avoid narcotics as these medications increase the risk of mortality in IBD patients  --Continue with home Mesalamine   --Patient will need DVT prophylaxis as IBD patients are in hypercoagulable state (on Heparin d/t coming in with FRANKLIN)   --GI is signing off patient to primary team     Gastroenterology follow up recommendations:  --GI will arrange a follow up appointment with Dr. Allen    Pt care plan discussed with Dr. Allen, GI staff physician. Thank you for involving us in this patient's care. Please do not hesitate to contact the GI service with any questions or concerns.    TAI [facility]  Department of Gastroenterology   P: [phone]  Subjective\events within the 24 hours:   Patient is eating foods with empty calorie including puddings and cookies. Discussed and encouraged to eat foods with protein.     4 point ROS performed and negative unless noted above.           Medications:     Current Facility-Administered Medications   Medication     acetaminophen (TYLENOL) Suppository 650 mg     acetaminophen (TYLENOL) tablet 650 mg     albuterol (PROAIR HFA/PROVENTIL HFA/VENTOLIN HFA) 108 (90 Base) MCG/ACT inhaler 2 puff     amLODIPine (NORVASC) tablet 5 mg     aspirin (ASA) chewable tablet 81 mg     calcium carbonate 600 mg-vitamin D 400 units (CALTRATE) per tablet 1 tablet     DULoxetine (CYMBALTA) DR capsule 20 mg     fluticasone (FLONASE) 50 MCG/ACT spray 1 spray     heparin sodium injection 5,000 Units     levothyroxine (SYNTHROID/LEVOTHROID) tablet 100 mcg     lidocaine (LMX4) cream     lidocaine 1 % 0.1-1 mL     medication instruction     melatonin tablet 6 mg      "mesalamine (ASACOL HD) EC tablet 800 mg     montelukast (SINGULAIR) tablet 10 mg     mycophenolate (GENERIC EQUIVALENT) capsule 1,000 mg     naloxone (NARCAN) injection 0.1-0.4 mg     nitroGLYcerin (NITROSTAT) sublingual tablet 0.4 mg     omeprazole (priLOSEC) CR capsule 40 mg     ondansetron (ZOFRAN) injection 4 mg     ondansetron (ZOFRAN-ODT) ODT tab 4 mg     Patient is NOT allergic to contrast dye OR was given pre-procedure oral steroids for treatment     rosuvastatin (CRESTOR) tablet 10 mg     sodium chloride (PF) 0.9% PF flush 3 mL     sodium chloride (PF) 0.9% PF flush 3 mL     tacrolimus (GENERIC EQUIVALENT) capsule 2 mg     traMADol (ULTRAM) half-tab 25 mg     valGANciclovir (VALCYTE) tablet 450 mg     vancomycin (FIRVANQ) oral solution 125 mg        Physical Exam   Blood pressure 117/74, pulse 92, temperature 98.7  F (37.1  C), temperature source Oral, resp. rate 14, height 1.727 m (5' 8\"), weight 54.7 kg (120 lb 9.5 oz), SpO2 98 %.    Constitutional: cooperative, pleasant, not dyspneic/diaphoretic, no acute distress  Eyes: Sclera anicteric/injected  Ears/nose/mouth/throat: Normal oropharynx without ulcers or exudate, mucus membranes moist, hearing intact  Neck: supple, thyroid normal size  CV: RRR. No edema in LE bilaterally   Respiratory: Unlabored breathing. Slightly diminished lower lobes bilaterally  Lymph: No axillary, submandibular, supraclavicular or inguinal lymphadenopathy  Abd: Nondistended, +bs, no hepatosplenomegaly, nontender, no peritoneal signs  Skin: warm, perfused, no jaundice  Neuro: AAO x 3, No asterixis  Psych: Normal affect  MSK: No gross deformities     Data   Current Labs  CBC  Recent Labs   Lab 09/25/19  0507 09/24/19  0437 09/23/19  2245 09/23/19  1534   WBC 3.5* 3.6*  --  4.6   RBC 3.13* 2.95*  --  3.97*   HGB 8.0* 7.6*  --  10.2*   HCT 25.7* 24.2*  --  32.7*   MCV 82 82  --  82   MCH 25.6* 25.8*  --  25.7*   MCHC 31.1* 31.4*  --  31.2*   RDW 14.1 14.0  --  13.9    234 242 " 290     BMP  Recent Labs   Lab 09/25/19  0507 09/24/19  1947 09/24/19  0435 09/24/19  0035 09/23/19  1534    137 138 137 134   POTASSIUM 4.0 3.9 3.9 4.1 4.6   CHLORIDE 116* 114* 116* 112* 108   CO2 14* 15* 12* 12* 15*   ANIONGAP 8 8 11 13 11   GLC 90 116* 84 109* 96   BUN 26 30 40* 41* 47*   CR 1.61* 1.66* 1.78* 1.88* 2.41*   GFRESTIMATED 47* 45* 42* 39* 29*   GFRESTBLACK 54* 52* 48* 45* 33*   RADAMES 8.5 8.1* 7.6* 7.9* 9.0   MAG  --   --   --   --  2.0   PHOS  --   --   --   --  4.8*      INR  Recent Labs   Lab 09/23/19  1534   INR 1.17*     Liver panel  Recent Labs   Lab 09/25/19  0507 09/24/19  0435 09/23/19  1534   PROTTOTAL 5.7* 5.4* 7.4   ALBUMIN 3.1* 2.9* 4.0   BILITOTAL 0.3 0.4 0.4   ALKPHOS 237* 218* 301*   AST 11 11 16   ALT 15 14 17                 History of Present Illness:   Evreton Larios is a 54 year old male with a history of ulcerative pancolitis diagnosed in 2005 (on Balsalazide), CMV viremia, C. Diff on 9/20/19, ASD s/p repair in childhood, NICM from congenital heart disease s/p OHT on 2/24/19, immunosuppressed state (on tacrolimus, MMF and prednisone), Luisito's esophagus w/o dysplasia, osteoporosis and now being admitted for abdominal pain, nausea/vomiting and diarrhea.      Patient had L-sided/RLQ abdominal pian, nausea/vomiting and diarrhea since mid August 2019, but symptoms have worsened since Friday 9/20/19.  Abdominal pain is cramping in nature, due to nausea/vomiting he has been throwing up most of oral intake without blood/coffee ground emesis, and stools have been over 10 brown-liquid stools/day without any blood as he would usually see with his previous ulcerative colitis flare.  Occasionally he was able to keep bites of sandwich and liquid but not much.  Patient noted having chills since transplant which has not been worse than before.  He denies fever or body aches.  He reports having cough with white sputum for approximately 2 weeks.  He supposed to get oral vancomycin on  Sunday but pharmacy was closed.  Today he called the clinic to notify how he is feeling and he was directed to come to the emergency department.  No NSAID, tobacco, or alcohol. Patient denies recent travel.use or being exposed to sick people besides the multiple hospitalization that he had recently.

## 2019-09-25 NOTE — PROGRESS NOTES
[facility]    Transplant Infectious Diseases Inpatient Progress Note      Everton Larios MRN# [id number]   YOB: 1965 Age: 54 year old   Date of Admission and time: 9/23/2019  3:27 PM            Recommendations:   1. Continue VGCV 450 mg daily (CrCl 34.9).   2. Will check CMV PCR tomorrow (ordered for you).   3. Will discuss the need for shingrix vaccine and repeat 23-valent pneumovax when the patient's condition improves.         Summary of Presentation:   Transplants:  2/24/2019 (Heart), Postoperative day:  213     This patient is a 54 year old male with congenital heart disease s/p OHT in 2/2019 with methylprednisolone induction and TAC/MMF/prednisone for maintenance.   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     Admitted with persistent diarrhea, now with abdominal pain in the setting of a recent diagnosis of CDI and CMV viremia. Also with cough.         Active Problems and Infectious Diseases Issues:   1. CDI.   2. 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   In 7/2019 CMV was 3.5 log. It was repeated 9/17/2019 and resulted at 3.9 log. This replication rate is very slow which is reassuring. The CMV replication rate is not high probably due to CMV +/+ serostatus.   Though the diarrhea is likely due to CDI, I can not rule out CMV and/or UC component.   No blurry vision.     VGCV was started 9/20/2019.   Though next CMV PCR should be on 9/27/2019, I will check tomorrow to facilitate discharge planning.     PO vancomycin was started 9/23/2019.       2. FRANKLIN   Likely due to diarrhea.   The Cr is improving.   Will continue to adjust the VGCV dose accordingly.     3. High alk phos  It is actually now improving.   Given hx of UC, GI were planning MRI of the biliary tract as OP. Will defer management to GI colleagues.  Whether CMV is contributing to  this high alk phos and now improving given initiating VGCV is also possible.      4. P aeruginosa BSI 4/29/2019 with P aeruginosa HCAP.   5. Abnormal CT chest with pulmonary nodules that are improving.   The P aeruginosa BSI and HCAP occurred in the setting of febrile neutropenia.   We treated him for severe pseudomonal pneumonia with 4 week course of ABx. On 5/28/2019 he concluded 4 weeks of cefepime from the first negative blood cx on 4/30/2019. Cefepime was used due to QTc prolongation prohibiting the use of fluoroquinolones.   Repeat CT chest 5/22/2019 showed significant improvement of pulmonary nodules. This improvement continues to be evident on CT done this admission on 9/24/2019 while off of ABx and antifungals.       Though the CT scan findings can be explained by P aeruginosa pneumonia, I was concerned about aspergillosis given the pulmonary nodules with GGO, low level of positive BD glucan at 109, and the neutropenia. Hence, I also treated the patient with micafungin starting 5/6/2019 till 6/3/2019 for total of 4 weeks.    Prolonged QTc and high costs prohibited the use of voriconazole/posaconazole and isavuconazole, respectively.          Old Problems and Infectious Diseases Issues:   1. Drug induced neutropenia resolved after stopping bactrim and VGCV.      Other Infectious Disease issues include:  - QTc 532 9/23/2019.   - PCP prophylaxis: off of bactrim since 4/26/2019. Was given pentamidine till 8/2019.   - Serostatus: CMV D+/R+, EBV D+/R+, HSV1+/2-, VZV +               VGCV was discontinued due to neutropenia. CMV PCR was not being checked weekly as recommended by ID in 5/2019. now on VGCV for induction therapy for CMV viremia.   - Immunization status: good candidate for shingrix. Also repeat 23-valent pneumovax sometime after 9/26/2019.   - Gamma globulin status: 358 5/1/2019.       Thank you very much Dr. Young for involving me in the care of Mr. Everton Larios. Please do not hesitate to call  me for any question.     Attestation:  I interviewed the patient and obtained history from the patient, and by reviewing the patient's chart including outside records, microbiological data, and radiological data. All data are summarized in this notes.  Ric Shukla MD   Pager: [phone]  09/25/2019           Interim History:   Stated that the diarrhea has improved.   No fever.   Still with abdominal pain.   Still with poor appetite and nausea but no vomiting.          History of Present Illness:   Transplants:  2/24/2019 (Heart), Postoperative day:  213     This patient is a 54 year old male with congenital heart disease s/p OHT in 2/2019 with methylprednisolone induction and TAC/MMF/prednisone for maintenance.      The course was complicated by neutropenia late 4/2019. He was then admitted with fever, neutropenia dyspnea, and cough.   Was found to have multiple pulmonary nodules with GGO on chest CT. Blood cx on 4/29/2019 and BAL on 4/30/2019 grew P aeruginosa.   Treated with cefepime IV for 4 weeks.   Due to multiple risk factors for aspergillosis including neutropenia and positive BD glucan, he was also empirically treated with micafunin IV starting 5/6/2019.   We could not use azoles and fluoroquinolones due to prolonged QTc at baseline. Costs prohibited the use of isavuconazole.      Neutropenia resolved after the 2 doses of G-CSF on 5/1/2019 and 5/2/2019. Also after the discontinuation of VGCV and bactrim and decreasing the dose of MMF.      On 5/24/2019, TTE showed decrease EF. MMF dose was increased though right heart Bx did not show rejection.   Due to neutropenia and inability to use VGCV, ID recommended weekly CMV PCR in the setting of increasing MMF.   In July of 2019 his CMV VL was 3.5 log, it was repeated in 9/2019 and was 3.9 log.   I started him on VGCV adjusted to kidney function. The patient started VGCV on 9/20/2019.  He was also complaining of diarrhea and abdominal pain. I thought the  abdominal pain was due to CMV. He was evaluated the same day by GI due to hx of UC. His C diff test was positive.   Before he was able to start vancomycin PO, he needed to be admitted for worsening lower abdomina pain with few episodes of N/V and persistent diarrhea.  The diarrhea is watery and numerous. The abdominal pain is currently 6/10. No radiation, worse with oral intake and maybe bit better after passing gas and BM. No fever.  The patient is c/o dry cough. He states that everything got worse after receiving the influenza vaccine on 9/20/2019.                 Medications:   Medications that Require Transfusion:     - MEDICATION INSTRUCTIONS -       - MEDICATION INSTRUCTIONS -         Scheduled Medications:     amLODIPine  5 mg Oral Daily     aspirin  81 mg Oral Daily     calcium carbonate 600 mg-vitamin D 400 units  1 tablet Oral BID w/meals     DULoxetine  20 mg Oral Daily     heparin  5,000 Units Subcutaneous Q12H     levothyroxine  100 mcg Oral Daily     melatonin  6 mg Oral At Bedtime     mesalamine  800 mg Oral TID     montelukast  10 mg Oral At Bedtime     mycophenolate  1,000 mg Oral BID     omeprazole  40 mg Oral Daily     rosuvastatin  10 mg Oral Daily     sodium chloride (PF)  3 mL Intracatheter Q8H     tacrolimus  2 mg Oral BID IS     valGANciclovir  450 mg Oral Daily     vancomycin  125 mg Oral 4x Daily             Review of Systems:    As mentioned in the interim history otherwise negative by reviewing constitutional symptoms, central and peripheral neurological systems, respiratory system, cardiac system, GI system,  system, musculoskeletal, skin, allergy, and lymphatics.                  Physical Exam:   Temp: 98.8  F (37.1  C) Temp src: Oral BP: 126/82 Pulse: 92 Heart Rate: 104 Resp: 18 SpO2: 99 % O2 Device: None (Room air) Oxygen Delivery: 2 LPM    Wt Readings from Last 4 Encounters:   09/25/19 54.7 kg (120 lb 9.5 oz)   09/17/19 56.2 kg (123 lb 14.4 oz)   09/17/19 56.3 kg (124 lb 3.2 oz)    08/13/19 60.1 kg (132 lb 9.6 oz)       Constitutional: awake, alert, cooperative, no apparent distress and appears at stated age, well nourished.            Data:   No results found for: ACD4    Inflammatory Markers    Recent Labs   Lab Test 09/24/19  0435 09/23/19  1534 09/17/19  1702 05/15/19  0940 05/02/19  0536 04/29/19  0911 11/15/18  0955 10/22/18  1239   SED  --  17 18  --  37*  --   --   --    CRP <2.9 3.8 <2.9 <2.9 69.0* 120.0* 6.1 12.5       Immune Globulin Studies     Recent Labs   Lab Test 09/17/19  1702 05/01/19  1850   IGG  --  358*   IGM  --  29*   IGE  --  38    106       Metabolic Studies       Recent Labs   Lab Test 09/25/19  0507 09/24/19  1947 09/24/19  0435 09/24/19  0035 09/23/19  1534 09/17/19  0856  07/17/19  1030  06/17/19  0926  04/29/19  0918  02/23/19  1835    137 138 137 134 136   < > 135   < > 137   < >  --    < > 133   POTASSIUM 4.0 3.9 3.9 4.1 4.6 4.1   < > 4.2   < > 4.1   < >  --    < > 3.6   CHLORIDE 116* 114* 116* 112* 108 110*   < > 103   < > 104   < >  --    < > 93*   CO2 14* 15* 12* 12* 15* 18*   < > 24   < > 20   < >  --    < > 30   ANIONGAP 8 8 11 13 11 8   < > 8   < > 13   < >  --    < > 10   BUN 26 30 40* 41* 47* 37*   < > 17   < > 27   < >  --    < > 28   CR 1.61* 1.66* 1.78* 1.88* 2.41* 2.02*   < > 1.07   < > 1.39*   < >  --    < > 1.03   GFRESTIMATED 47* 45* 42* 39* 29* 36*   < > 77   < > 56*   < >  --    < > 81   GLC 90 116* 84 109* 96 90   < > 80   < > 80   < >  --    < > 114*   A1C  --   --   --   --   --   --   --   --   --   --   --   --   --  5.7*   RADAMES 8.5 8.1* 7.6* 7.9* 9.0 9.1   < > 8.9   < > 8.8   < >  --    < > 8.9   PHOS  --   --   --   --  4.8*  --   --  4.0  --  3.8   < >  --    < > 3.6   MAG  --   --   --   --  2.0  --   --  1.7  --  1.4*   < >  --    < > 2.0   LACT  --   --   --   --  1.2  --   --   --   --   --   --  1.7   < >  --    CKT  --   --   --   --   --   --   --  34  --  46   < >  --   --   --     < > = values in this interval not  displayed.       Hepatic Studies    Recent Labs   Lab Test 09/25/19  0507 09/24/19  0435 09/23/19  1534 09/17/19  1702 09/17/19  0856 07/24/19  1013 06/26/19  0936  03/14/19  1653  03/10/19  0410   BILITOTAL 0.3 0.4 0.4  --  0.3 0.5 0.5   < >  --    < > 0.8   ALKPHOS 237* 218* 301*  --  285* 268* 160*   < >  --    < > 104   ALBUMIN 3.1* 2.9* 4.0  --  3.6 3.5 3.8   < >  --    < > 2.8*   AST 11 11 16  --  16 25 21   < >  --    < > 22   ALT 15 14 17  --  23 26 15   < >  --    < > 19   LDH  --   --   --   --   --   --   --   --  320*  --  276*   GGT  --   --   --  130*  --   --   --   --   --   --   --     < > = values in this interval not displayed.       Pancreatitis testing    Recent Labs   Lab Test 09/23/19  1534 09/17/19  1702 05/22/19  0926 04/29/19  0911 03/18/19  0505 02/23/19  1835 11/15/18  0955   AMYLASE  --   --   --   --  51 22*  --    LIPASE 151  --   --  42* 237  --   --    TRIG  --  166* 175*  --   --   --  112       Hematology Studies      Recent Labs   Lab Test 09/25/19  0507 09/24/19  0437 09/23/19  2245 09/23/19  1534 09/17/19  1702 09/17/19  0856 08/13/19  1200 07/24/19  1013 07/17/19  1030   WBC 3.5* 3.6*  --  4.6 3.7* 3.5* 3.6* 4.9 4.9   ANEU  --  1.9  --  2.9 2.0 1.9  --  2.6 2.7   ALYM  --  0.8  --  1.0 1.1 0.9  --  1.1 1.0   GRACE  --  0.4  --  0.4 0.4 0.5  --  0.7 0.6   AEOS  --  0.3  --  0.3 0.3 0.3  --  0.3 0.4   HGB 8.0* 7.6*  --  10.2* 10.1* 9.5* 9.2* 9.3* 9.3*   HCT 25.7* 24.2*  --  32.7* 32.8* 28.5* 30.3* 29.9* 31.0*    234 242 290 284 279 273 251 244       Clotting Studies    Recent Labs   Lab Test 09/23/19  1534 05/08/19  0504 05/07/19  0451 05/06/19  0438  03/14/19  1653  02/27/19  0421 02/25/19  1502   INR 1.17* 1.22* 1.33* 1.31*   < > 1.36*   < >  --  1.29*   PTT 35  --   --   --   --  46*  --  38* 32    < > = values in this interval not displayed.       Arterial Blood Gas Testing    Recent Labs   Lab Test 03/11/19  1152 03/10/19  1214 03/09/19  0400 03/08/19  2147   02/25/19  1717 02/25/19  1502 02/25/19  0918  02/25/19  0350 02/25/19  0110   PH  --   --   --   --   --  7.32* 7.31* 7.33*  --  7.39 7.35   PCO2  --   --   --   --   --  36 51* 46*  --  42 42   PO2  --   --   --   --   --  110* 75* 125*  --  87 170*   HCO3  --   --   --   --   --  19* 26 24  --  25 23   O2PER 21 21 21 21.0   < > 3L 2  2 40.0  40.0   < > 40.0  40.0 60.0    < > = values in this interval not displayed.        Urine Studies     Recent Labs   Lab Test 05/26/19  1006 04/29/19  1031 03/26/19  1707 03/17/19  0045 03/04/19  1303   URINEPH 6.5 6.0 5.5 5.5 5.0   NITRITE Negative Negative Negative Negative Negative   LEUKEST Negative Negative Negative Negative Negative   WBCU 0 <1 2 15* 0       Vancomycin Levels     Recent Labs   Lab Test 05/02/19  1519   VANCOMYCIN 10.0       Tacrolimus levels    Invalid input(s): TACROLIMUS, TAC, TACR  Transplant Immunosuppression Labs Latest Ref Rng & Units 9/25/2019 9/24/2019 9/24/2019 9/24/2019 9/24/2019   Tacro Level 5.0 - 15.0 ug/L - - - 7.0 -   Tacro Level - - - - Not Provided -   Creat 0.66 - 1.25 mg/dL 1.61(H) 1.66(H) - 1.78(H) 1.88(H)   BUN 7 - 30 mg/dL 26 30 - 40(H) 41(H)   WBC 4.0 - 11.0 10e9/L 3.5(L) - 3.6(L) - -   Neutrophil % - - 53.8 - -   ANEU 1.6 - 8.3 10e9/L - - 1.9 - -       Microbiology:  Blood cx negative to date.   Last check of C difficile  C Diff Toxin B PCR   Date Value Ref Range Status   09/20/2019 Positive (A) NEG^Negative Final     Comment:     Positive: Toxin producing C. difficile target DNA sequences detected, presumed   positive for C. difficile toxin B. C. difficile (Requires Enteric Isolation).      Clostridium difficile (Requires Enteric Isolation)  FDA approved assay performed using [employer] GeneXpert real-time PCR.         Virology:  CMV viral loads  No results found for: 04176, 38147, 39996, 78973  CMV viral loads    Recent Labs   Lab Test 09/17/19  0856 07/24/19  1013   CSPEC Plasma EDTA PLASMA   CMVLOG 3.9* 3.5*       CMV viral loads     Log IU/mL of CMVQNT   Date Value Ref Range Status   09/17/2019 3.9 (H) <2.1 [Log_IU]/mL Final   07/24/2019 3.5 (H) <2.1 [Log_IU]/mL Final   06/26/2019 <2.1 <2.1 [Log_IU]/mL Final   06/04/2019 Not Calculated <2.1 [Log_IU]/mL Final   05/24/2019 Not Calculated <2.1 [Log_IU]/mL Final   05/22/2019 Not Calculated <2.1 [Log_IU]/mL Final   05/15/2019 Not Calculated <2.1 [Log_IU]/mL Final   05/06/2019 Not Calculated <2.1 [Log_IU]/mL Final   04/30/2019 Not Calculated <2.1 [Log_IU]/mL Final   04/29/2019 Not Calculated <2.1 [Log_IU]/mL Final   04/26/2019 Not Calculated <2.1 [Log_IU]/mL Final   04/24/2019 Not Calculated <2.1 [Log_IU]/mL Final   04/17/2019 Not Calculated <2.1 [Log_IU]/mL Final   04/08/2019 Not Calculated <2.1 [Log_IU]/mL Final   04/01/2019 Not Calculated <2.1 [Log_IU]/mL Final   03/25/2019 Not Calculated <2.1 [Log_IU]/mL Final   03/18/2019 Not Calculated <2.1 [Log_IU]/mL Final       CMV resistance testing  No lab results found.  No results found for: CMVCID, CMVFOS, CMVGAN     No results found for: H6RES    EBV DNA Copies/mL   Date Value Ref Range Status   09/17/2019 EBV DNA Not Detected EBVNEG^EBV DNA Not Detected [Copies]/mL Final   05/22/2019 541 (A) EBVNEG^EBV DNA Not Detected [Copies]/mL Final   04/29/2019 EBV DNA Not Detected EBVNEG^EBV DNA Not Detected [Copies]/mL Final   04/26/2019 EBV DNA Not Detected EBVNEG^EBV DNA Not Detected [Copies]/mL Final   03/27/2019 EBV DNA Not Detected EBVNEG^EBV DNA Not Detected [Copies]/mL Final       CMV Antibody IgG   Date Value Ref Range Status   02/23/2019 >8.0 (H) 0.0 - 0.8 AI Final     Comment:     Positive  Antibody index (AI) values reflect qualitative changes in antibody   concentration that cannot be directly associated with clinical condition or   disease state.     02/15/2019 >8.0 (H) 0.0 - 0.8 AI Final     Comment:     Positive  Antibody index (AI) values reflect qualitative changes in antibody   concentration that cannot be directly associated with clinical  condition or   disease state.         Toxoplasma Antibody IgG   Date Value Ref Range Status   02/22/2019 <3.0 0.0 - 7.1 IU/mL Final     Comment:     Negative- Absence of detectable Toxoplasma gondii IgG antibodies. A negative   result does not rule out acute infection.  The magnitude of the measured result is not indicative of the amount of   antibody present. The concentrations of anti-Toxoplasma gondii IgG in a given   specimen determined with assays from different manufacturers can vary due to   differences in assay methods and reagent specificity.         Imaging:  CT chest/abdomen/pelvis 9/23/2019   IMPRESSION:  1.  Continued decrease in size of left upper lung lesion with residual  nodularity, previously measured 10 x 8 mm, suggestive of  infectious/inflammatory process. No new pulmonary lesions.  2.  Limited evaluation of abdomen and pelvis due to lack of IV  contrast. Nonspecific numerous subcentimeter mesenteric and  retroperitoneal lymph nodes, likely reactive.  3.  Resolution of ascites.  4.  Blood pool density lower than myocardium, compatible with  patient's known anemia.        Ric Shukla MD  Pager: [phone]  09/25/2019

## 2019-09-26 ENCOUNTER — APPOINTMENT (OUTPATIENT)
Dept: GENERAL RADIOLOGY | Facility: CLINIC | Age: 54
DRG: 372 | End: 2019-09-26
Attending: STUDENT IN AN ORGANIZED HEALTH CARE EDUCATION/TRAINING PROGRAM
Payer: COMMERCIAL

## 2019-09-26 LAB
ALBUMIN SERPL-MCNC: 3.4 G/DL (ref 3.4–5)
ALP SERPL-CCNC: 272 U/L (ref 40–150)
ALT SERPL W P-5'-P-CCNC: 17 U/L (ref 0–70)
ANION GAP SERPL CALCULATED.3IONS-SCNC: 11 MMOL/L (ref 3–14)
AST SERPL W P-5'-P-CCNC: 15 U/L (ref 0–45)
BILIRUB SERPL-MCNC: 0.4 MG/DL (ref 0.2–1.3)
BUN SERPL-MCNC: 17 MG/DL (ref 7–30)
CALCIUM SERPL-MCNC: 8.6 MG/DL (ref 8.5–10.1)
CHLORIDE SERPL-SCNC: 110 MMOL/L (ref 94–109)
CMV DNA SPEC NAA+PROBE-ACNC: 976 [IU]/ML
CMV DNA SPEC NAA+PROBE-LOG#: 3 {LOG_IU}/ML
CO2 SERPL-SCNC: 15 MMOL/L (ref 20–32)
CREAT SERPL-MCNC: 1.56 MG/DL (ref 0.66–1.25)
ERYTHROCYTE [DISTWIDTH] IN BLOOD BY AUTOMATED COUNT: 14.3 % (ref 10–15)
GFR SERPL CREATININE-BSD FRML MDRD: 49 ML/MIN/{1.73_M2}
GLUCOSE SERPL-MCNC: 87 MG/DL (ref 70–99)
HCT VFR BLD AUTO: 28.8 % (ref 40–53)
HGB BLD-MCNC: 9 G/DL (ref 13.3–17.7)
MCH RBC QN AUTO: 25.6 PG (ref 26.5–33)
MCHC RBC AUTO-ENTMCNC: 31.3 G/DL (ref 31.5–36.5)
MCV RBC AUTO: 82 FL (ref 78–100)
PLATELET # BLD AUTO: 243 10E9/L (ref 150–450)
PLATELET # BLD AUTO: 247 10E9/L (ref 150–450)
POTASSIUM SERPL-SCNC: 4 MMOL/L (ref 3.4–5.3)
PROT SERPL-MCNC: 6.7 G/DL (ref 6.8–8.8)
RBC # BLD AUTO: 3.51 10E12/L (ref 4.4–5.9)
SODIUM SERPL-SCNC: 136 MMOL/L (ref 133–144)
SPECIMEN SOURCE: ABNORMAL
TACROLIMUS BLD-MCNC: 3.1 UG/L (ref 5–15)
TME LAST DOSE: ABNORMAL H
WBC # BLD AUTO: 4.2 10E9/L (ref 4–11)

## 2019-09-26 PROCEDURE — 85027 COMPLETE CBC AUTOMATED: CPT | Performed by: STUDENT IN AN ORGANIZED HEALTH CARE EDUCATION/TRAINING PROGRAM

## 2019-09-26 PROCEDURE — 80197 ASSAY OF TACROLIMUS: CPT | Performed by: STUDENT IN AN ORGANIZED HEALTH CARE EDUCATION/TRAINING PROGRAM

## 2019-09-26 PROCEDURE — 87207 SMEAR SPECIAL STAIN: CPT | Performed by: INTERNAL MEDICINE

## 2019-09-26 PROCEDURE — 25000132 ZZH RX MED GY IP 250 OP 250 PS 637: Performed by: NURSE PRACTITIONER

## 2019-09-26 PROCEDURE — 99232 SBSQ HOSP IP/OBS MODERATE 35: CPT | Mod: GC | Performed by: INTERNAL MEDICINE

## 2019-09-26 PROCEDURE — 36415 COLL VENOUS BLD VENIPUNCTURE: CPT | Performed by: STUDENT IN AN ORGANIZED HEALTH CARE EDUCATION/TRAINING PROGRAM

## 2019-09-26 PROCEDURE — 25000131 ZZH RX MED GY IP 250 OP 636 PS 637: Performed by: STUDENT IN AN ORGANIZED HEALTH CARE EDUCATION/TRAINING PROGRAM

## 2019-09-26 PROCEDURE — 25000132 ZZH RX MED GY IP 250 OP 250 PS 637: Performed by: STUDENT IN AN ORGANIZED HEALTH CARE EDUCATION/TRAINING PROGRAM

## 2019-09-26 PROCEDURE — 74018 RADEX ABDOMEN 1 VIEW: CPT

## 2019-09-26 PROCEDURE — 80053 COMPREHEN METABOLIC PANEL: CPT | Performed by: STUDENT IN AN ORGANIZED HEALTH CARE EDUCATION/TRAINING PROGRAM

## 2019-09-26 PROCEDURE — 25000131 ZZH RX MED GY IP 250 OP 636 PS 637: Performed by: INTERNAL MEDICINE

## 2019-09-26 PROCEDURE — 12000012 ZZH R&B MS OVERFLOW UMMC

## 2019-09-26 PROCEDURE — 85049 AUTOMATED PLATELET COUNT: CPT | Performed by: STUDENT IN AN ORGANIZED HEALTH CARE EDUCATION/TRAINING PROGRAM

## 2019-09-26 RX ORDER — TACROLIMUS 1 MG/1
3 CAPSULE ORAL
Status: DISCONTINUED | OUTPATIENT
Start: 2019-09-26 | End: 2019-09-28 | Stop reason: HOSPADM

## 2019-09-26 RX ORDER — VALGANCICLOVIR 450 MG/1
450 TABLET, FILM COATED ORAL 2 TIMES DAILY
Status: DISCONTINUED | OUTPATIENT
Start: 2019-09-26 | End: 2019-09-28 | Stop reason: HOSPADM

## 2019-09-26 RX ADMIN — ROSUVASTATIN CALCIUM 10 MG: 10 TABLET, FILM COATED ORAL at 08:06

## 2019-09-26 RX ADMIN — ASPIRIN 81 MG CHEWABLE TABLET 81 MG: 81 TABLET CHEWABLE at 08:06

## 2019-09-26 RX ADMIN — VANCOMYCIN HYDROCHLORIDE 125 MG: KIT at 13:01

## 2019-09-26 RX ADMIN — MELATONIN TAB 3 MG 6 MG: 3 TAB at 20:29

## 2019-09-26 RX ADMIN — Medication 25 MG: at 04:39

## 2019-09-26 RX ADMIN — ONDANSETRON 4 MG: 4 TABLET, ORALLY DISINTEGRATING ORAL at 04:39

## 2019-09-26 RX ADMIN — VANCOMYCIN HYDROCHLORIDE 125 MG: KIT at 08:06

## 2019-09-26 RX ADMIN — DULOXETINE HYDROCHLORIDE 20 MG: 20 CAPSULE, DELAYED RELEASE ORAL at 08:06

## 2019-09-26 RX ADMIN — ONDANSETRON 4 MG: 4 TABLET, ORALLY DISINTEGRATING ORAL at 18:08

## 2019-09-26 RX ADMIN — ACETAMINOPHEN 650 MG: 325 TABLET, FILM COATED ORAL at 13:01

## 2019-09-26 RX ADMIN — MESALAMINE 800 MG: 800 TABLET, DELAYED RELEASE ORAL at 13:01

## 2019-09-26 RX ADMIN — VANCOMYCIN HYDROCHLORIDE 125 MG: KIT at 19:59

## 2019-09-26 RX ADMIN — LEVOTHYROXINE SODIUM 100 MCG: 100 TABLET ORAL at 08:06

## 2019-09-26 RX ADMIN — ACETAMINOPHEN 650 MG: 325 TABLET, FILM COATED ORAL at 16:52

## 2019-09-26 RX ADMIN — Medication 1 TABLET: at 18:08

## 2019-09-26 RX ADMIN — TACROLIMUS 2 MG: 1 CAPSULE ORAL at 08:06

## 2019-09-26 RX ADMIN — MESALAMINE 800 MG: 800 TABLET, DELAYED RELEASE ORAL at 08:06

## 2019-09-26 RX ADMIN — ACETAMINOPHEN 650 MG: 325 TABLET, FILM COATED ORAL at 04:39

## 2019-09-26 RX ADMIN — ACETAMINOPHEN 650 MG: 325 TABLET, FILM COATED ORAL at 08:44

## 2019-09-26 RX ADMIN — Medication 1 TABLET: at 08:06

## 2019-09-26 RX ADMIN — VANCOMYCIN HYDROCHLORIDE 125 MG: KIT at 16:52

## 2019-09-26 RX ADMIN — MYCOPHENOLATE MOFETIL 1000 MG: 250 CAPSULE ORAL at 08:06

## 2019-09-26 RX ADMIN — TACROLIMUS 3 MG: 1 CAPSULE ORAL at 18:08

## 2019-09-26 RX ADMIN — AMLODIPINE BESYLATE 5 MG: 5 TABLET ORAL at 08:06

## 2019-09-26 RX ADMIN — VALGANCICLOVIR 450 MG: 450 TABLET, FILM COATED ORAL at 08:06

## 2019-09-26 RX ADMIN — MESALAMINE 800 MG: 800 TABLET, DELAYED RELEASE ORAL at 19:59

## 2019-09-26 RX ADMIN — VALGANCICLOVIR 450 MG: 450 TABLET, FILM COATED ORAL at 19:59

## 2019-09-26 RX ADMIN — OMEPRAZOLE 40 MG: 20 CAPSULE, DELAYED RELEASE ORAL at 08:06

## 2019-09-26 RX ADMIN — MYCOPHENOLATE MOFETIL 1000 MG: 250 CAPSULE ORAL at 19:59

## 2019-09-26 ASSESSMENT — ACTIVITIES OF DAILY LIVING (ADL)
ADLS_ACUITY_SCORE: 11

## 2019-09-26 ASSESSMENT — PAIN DESCRIPTION - DESCRIPTORS
DESCRIPTORS: ACHING

## 2019-09-26 NOTE — INTERIM SUMMARY
Rosendo Beck M.D.  Cardiovascular Medicine    I personally saw and examined this patient, discussed care with housestaff and other consultants, reviewed current laboratories and imaging studies, and conveyed impression and diagnostic/therapeutic plan to patient.    Problem List  1. Cardiac transplantation  2. Chronic immunosuppression  3.  Compression fracture  4. Acute renal failure  5. CMV viremia  6. Colitis c. Diff  7. Anemia with microcytosis  8. Hypothyroidism  9. Low CO2 ? diarrhea    Intake/Output Summary (Last 24 hours) at 9/26/2019 1616  Last data filed at 9/26/2019 0105  Gross per 24 hour   Intake 500 ml   Output 600 ml   Net -100 ml     Wt Readings from Last 5 Encounters:   09/25/19 54.7 kg (120 lb 9.5 oz)   09/17/19 56.2 kg (123 lb 14.4 oz)   09/17/19 56.3 kg (124 lb 3.2 oz)   08/13/19 60.1 kg (132 lb 9.6 oz)   07/17/19 60.1 kg (132 lb 9.6 oz)       Meds    amLODIPine  5 mg Oral Daily     aspirin  81 mg Oral Daily     calcium carbonate 600 mg-vitamin D 400 units  1 tablet Oral BID w/meals     DULoxetine  20 mg Oral Daily     levothyroxine  100 mcg Oral Daily     melatonin  6 mg Oral At Bedtime     mesalamine  800 mg Oral TID     montelukast  10 mg Oral At Bedtime     mycophenolate  1,000 mg Oral BID     omeprazole  40 mg Oral Daily     rosuvastatin  10 mg Oral Daily     sodium chloride (PF)  3 mL Intracatheter Q8H     tacrolimus  3 mg Oral BID IS     valGANciclovir  450 mg Oral Daily     vancomycin  125 mg Oral 4x Daily       Labs  CMP  Recent Labs   Lab 09/26/19  1127 09/25/19  0507 09/24/19  1947 09/24/19  0435  09/23/19  1534    138 137 138   < > 134   POTASSIUM 4.0 4.0 3.9 3.9   < > 4.6   CHLORIDE 110* 116* 114* 116*   < > 108   CO2 15* 14* 15* 12*   < > 15*   ANIONGAP 11 8 8 11   < > 11   GLC 87 90 116* 84   < > 96   BUN 17 26 30 40*   < > 47*   CR 1.56* 1.61* 1.66* 1.78*   < > 2.41*   GFRESTIMATED 49* 47* 45* 42*   < > 29*   GFRESTBLACK 57* 54* 52* 48*   < > 33*   RADAMES 8.6 8.5 8.1* 7.6*   <  > 9.0   MAG  --   --   --   --   --  2.0   PHOS  --   --   --   --   --  4.8*   PROTTOTAL 6.7* 5.7*  --  5.4*  --  7.4   ALBUMIN 3.4 3.1*  --  2.9*  --  4.0   BILITOTAL 0.4 0.3  --  0.4  --  0.4   ALKPHOS 272* 237*  --  218*  --  301*   AST 15 11  --  11  --  16   ALT 17 15  --  14  --  17    < > = values in this interval not displayed.     CBC  Recent Labs   Lab 09/26/19  1127 09/26/19  0424 09/25/19  0507 09/24/19  0437  09/23/19  1534   WBC 4.2  --  3.5* 3.6*  --  4.6   RBC 3.51*  --  3.13* 2.95*  --  3.97*   HGB 9.0*  --  8.0* 7.6*  --  10.2*   HCT 28.8*  --  25.7* 24.2*  --  32.7*   MCV 82  --  82 82  --  82   MCH 25.6*  --  25.6* 25.8*  --  25.7*   MCHC 31.3*  --  31.1* 31.4*  --  31.2*   RDW 14.3  --  14.1 14.0  --  13.9    247 228 234   < > 290    < > = values in this interval not displayed.     INR  Recent Labs   Lab 09/23/19  1534   INR 1.17*

## 2019-09-26 NOTE — PROGRESS NOTES
[facility]    Transplant Infectious Diseases Inpatient Progress Note      Everton Larios MRN# [id number]   YOB: 1965 Age: 54 year old   Date of Admission and time: 9/23/2019  3:27 PM            Recommendations:   1. Please increase VGCV 450 mg q12 hr. (CrCl 40.9).   2. Will check CMV PCR tomorrow (ordered for you).   3. Consider repeat abdominal X ray.   4. Please give shingrix vaccine and repeat 23-valent pneumovax before discharge.         Summary of Presentation:   Transplants:  2/24/2019 (Heart), Postoperative day:  214     This patient is a 54 year old male with congenital heart disease s/p OHT in 2/2019 with methylprednisolone induction and TAC/MMF/prednisone for maintenance.   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     Admitted with persistent diarrhea, now with abdominal pain in the setting of a recent diagnosis of CDI and CMV viremia. Also with cough.         Active Problems and Infectious Diseases Issues:   1. CDI.   2. 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   In 7/2019 CMV was 3.5 log. It was repeated 9/17/2019 and resulted at 3.9 log. This replication rate is very slow which is reassuring. The CMV replication rate is not high probably due to CMV +/+ serostatus.   After 6 days of therapy CMV VL is now down to 3 log. VGCV was started 9/20/2019.     PO vancomycin was started 9/23/2019.      The diarrhea and abdominal pain are not improving. Also the patient is still with N/V.   Consider repeat abdominal X ray.      3. FRANKLIN   Likely due to diarrhea.   The Cr is improving.   Will continue to adjust the VGCV dose accordingly.     4. P aeruginosa BSI 4/29/2019 with P aeruginosa HCAP.   5. Abnormal CT chest with pulmonary nodules that are improving.   The P aeruginosa BSI and HCAP occurred in the setting of febrile neutropenia.   We  treated him for severe pseudomonal pneumonia with 4 week course of ABx. On 5/28/2019 he concluded 4 weeks of cefepime from the first negative blood cx on 4/30/2019. Cefepime was used due to QTc prolongation prohibiting the use of fluoroquinolones.   Repeat CT chest 5/22/2019 showed significant improvement of pulmonary nodules. This improvement continues to be evident on CT done this admission on 9/24/2019 while off of ABx and antifungals.       Though the CT scan findings can be explained by P aeruginosa pneumonia, I was concerned about aspergillosis given the pulmonary nodules with GGO, low level of positive BD glucan at 109, and the neutropenia. Hence, I also treated the patient with micafungin starting 5/6/2019 till 6/3/2019 for total of 4 weeks.    Prolonged QTc and high costs prohibited the use of voriconazole/posaconazole and isavuconazole, respectively.          Old Problems and Infectious Diseases Issues:   1. Drug induced neutropenia resolved after stopping bactrim and VGCV.      Other Infectious Disease issues include:  - QTc 532 9/23/2019.   - PCP prophylaxis: off of bactrim since 4/26/2019. Was given pentamidine till 8/2019.   - Serostatus: CMV D+/R+, EBV D+/R+, HSV1+/2-, VZV +               VGCV was discontinued due to neutropenia. CMV PCR was not being checked weekly as recommended by ID in 5/2019. now on VGCV for induction therapy for CMV viremia.   - Immunization status: good candidate for shingrix. Also repeat 23-valent pneumovax sometime after 9/26/2019.   - Gamma globulin status: 358 5/1/2019.       Attestation:  I interviewed the patient and obtained history from the patient, and by reviewing the patient's chart including outside records, microbiological data, and radiological data. All data are summarized in this notes.  Ric Shukla MD   Pager: [phone]  09/26/2019           Interim History:   Still with persistent diarrhea and abdominal pain.   He feels that the abdominal pain is worse.  No exacerbating or relieving factors. The pain is in the lower abdomen with no radiation.   Still with N/V and poor appetite.   No other new complaints.          History of Present Illness:   Transplants:  2/24/2019 (Heart), Postoperative day:  214     This patient is a 54 year old male with congenital heart disease s/p OHT in 2/2019 with methylprednisolone induction and TAC/MMF/prednisone for maintenance.      The course was complicated by neutropenia late 4/2019. He was then admitted with fever, neutropenia dyspnea, and cough.   Was found to have multiple pulmonary nodules with GGO on chest CT. Blood cx on 4/29/2019 and BAL on 4/30/2019 grew P aeruginosa.   Treated with cefepime IV for 4 weeks.   Due to multiple risk factors for aspergillosis including neutropenia and positive BD glucan, he was also empirically treated with micafunin IV starting 5/6/2019.   We could not use azoles and fluoroquinolones due to prolonged QTc at baseline. Costs prohibited the use of isavuconazole.      Neutropenia resolved after the 2 doses of G-CSF on 5/1/2019 and 5/2/2019. Also after the discontinuation of VGCV and bactrim and decreasing the dose of MMF.      On 5/24/2019, TTE showed decrease EF. MMF dose was increased though right heart Bx did not show rejection.   Due to neutropenia and inability to use VGCV, ID recommended weekly CMV PCR in the setting of increasing MMF.   In July of 2019 his CMV VL was 3.5 log, it was repeated in 9/2019 and was 3.9 log.   I started him on VGCV adjusted to kidney function. The patient started VGCV on 9/20/2019.  He was also complaining of diarrhea and abdominal pain. I thought the abdominal pain was due to CMV. He was evaluated the same day by GI due to hx of UC. His C diff test was positive.   Before he was able to start vancomycin PO, he needed to be admitted for worsening lower abdomina pain with few episodes of N/V and persistent diarrhea.  The diarrhea is watery and numerous. The abdominal  pain is currently 6/10. No radiation, worse with oral intake and maybe bit better after passing gas and BM. No fever.  The patient is c/o dry cough. He states that everything got worse after receiving the influenza vaccine on 9/20/2019.             Medications:   Medications that Require Transfusion:     - MEDICATION INSTRUCTIONS -       - MEDICATION INSTRUCTIONS -         Scheduled Medications:     amLODIPine  5 mg Oral Daily     aspirin  81 mg Oral Daily     calcium carbonate 600 mg-vitamin D 400 units  1 tablet Oral BID w/meals     DULoxetine  20 mg Oral Daily     levothyroxine  100 mcg Oral Daily     melatonin  6 mg Oral At Bedtime     mesalamine  800 mg Oral TID     montelukast  10 mg Oral At Bedtime     mycophenolate  1,000 mg Oral BID     omeprazole  40 mg Oral Daily     rosuvastatin  10 mg Oral Daily     sodium chloride (PF)  3 mL Intracatheter Q8H     tacrolimus  3 mg Oral BID IS     valGANciclovir  450 mg Oral Daily     vancomycin  125 mg Oral 4x Daily             Review of Systems:    As mentioned in the interim history otherwise negative by reviewing constitutional symptoms, central and peripheral neurological systems, respiratory system, cardiac system, GI system,  system, musculoskeletal, skin, allergy, and lymphatics.                  Physical Exam:   Temp: 98.9  F (37.2  C) Temp src: Oral BP: 109/68 Pulse: 92 Heart Rate: 93 Resp: 18 SpO2: 99 % O2 Device: None (Room air)      Wt Readings from Last 4 Encounters:   09/25/19 54.7 kg (120 lb 9.5 oz)   09/17/19 56.2 kg (123 lb 14.4 oz)   09/17/19 56.3 kg (124 lb 3.2 oz)   08/13/19 60.1 kg (132 lb 9.6 oz)       Constitutional: awake, alert, cooperative, no apparent distress and appears at stated age, well nourished.   Abdomen: tense but not rigid, generalized tenderness, tympanic to percussion, hypoactive BS.            Data:   No results found for: ACD4    Inflammatory Markers    Recent Labs   Lab Test 09/24/19  0435 09/23/19  1534 09/17/19  1702  05/15/19  0940 05/02/19  0536 04/29/19  0911 11/15/18  0955 10/22/18  1239   SED  --  17 18  --  37*  --   --   --    CRP <2.9 3.8 <2.9 <2.9 69.0* 120.0* 6.1 12.5       Immune Globulin Studies     Recent Labs   Lab Test 09/17/19  1702 05/01/19  1850   IGG  --  358*   IGM  --  29*   IGE  --  38    106       Metabolic Studies       Recent Labs   Lab Test 09/26/19  1127 09/25/19  0507 09/24/19  1947 09/24/19  0435 09/24/19  0035 09/23/19  1534  07/17/19  1030  06/17/19  0926  04/29/19  0918  02/23/19  1835    138 137 138 137 134   < > 135   < > 137   < >  --    < > 133   POTASSIUM 4.0 4.0 3.9 3.9 4.1 4.6   < > 4.2   < > 4.1   < >  --    < > 3.6   CHLORIDE 110* 116* 114* 116* 112* 108   < > 103   < > 104   < >  --    < > 93*   CO2 15* 14* 15* 12* 12* 15*   < > 24   < > 20   < >  --    < > 30   ANIONGAP 11 8 8 11 13 11   < > 8   < > 13   < >  --    < > 10   BUN 17 26 30 40* 41* 47*   < > 17   < > 27   < >  --    < > 28   CR 1.56* 1.61* 1.66* 1.78* 1.88* 2.41*   < > 1.07   < > 1.39*   < >  --    < > 1.03   GFRESTIMATED 49* 47* 45* 42* 39* 29*   < > 77   < > 56*   < >  --    < > 81   GLC 87 90 116* 84 109* 96   < > 80   < > 80   < >  --    < > 114*   A1C  --   --   --   --   --   --   --   --   --   --   --   --   --  5.7*   RADAMES 8.6 8.5 8.1* 7.6* 7.9* 9.0   < > 8.9   < > 8.8   < >  --    < > 8.9   PHOS  --   --   --   --   --  4.8*  --  4.0  --  3.8   < >  --    < > 3.6   MAG  --   --   --   --   --  2.0  --  1.7  --  1.4*   < >  --    < > 2.0   LACT  --   --   --   --   --  1.2  --   --   --   --   --  1.7   < >  --    CKT  --   --   --   --   --   --   --  34  --  46   < >  --   --   --     < > = values in this interval not displayed.       Hepatic Studies    Recent Labs   Lab Test 09/26/19  1127 09/25/19  0507 09/24/19  0435 09/23/19  1534 09/17/19  1702 09/17/19  0856 07/24/19  1013  03/14/19  1653  03/10/19  0410   BILITOTAL 0.4 0.3 0.4 0.4  --  0.3 0.5   < >  --    < > 0.8   ALKPHOS 272* 237* 218* 301*   --  285* 268*   < >  --    < > 104   ALBUMIN 3.4 3.1* 2.9* 4.0  --  3.6 3.5   < >  --    < > 2.8*   AST 15 11 11 16  --  16 25   < >  --    < > 22   ALT 17 15 14 17  --  23 26   < >  --    < > 19   LDH  --   --   --   --   --   --   --   --  320*  --  276*   GGT  --   --   --   --  130*  --   --   --   --   --   --     < > = values in this interval not displayed.       Pancreatitis testing    Recent Labs   Lab Test 09/23/19  1534 09/17/19  1702 05/22/19  0926 04/29/19  0911 03/18/19  0505 02/23/19  1835 11/15/18  0955   AMYLASE  --   --   --   --  51 22*  --    LIPASE 151  --   --  42* 237  --   --    TRIG  --  166* 175*  --   --   --  112       Hematology Studies      Recent Labs   Lab Test 09/26/19  1127 09/26/19  0424 09/25/19  0507 09/24/19  0437  09/23/19  1534 09/17/19  1702 09/17/19  0856  07/24/19  1013 07/17/19  1030   WBC 4.2  --  3.5* 3.6*  --  4.6 3.7* 3.5*   < > 4.9 4.9   ANEU  --   --   --  1.9  --  2.9 2.0 1.9  --  2.6 2.7   ALYM  --   --   --  0.8  --  1.0 1.1 0.9  --  1.1 1.0   GRACE  --   --   --  0.4  --  0.4 0.4 0.5  --  0.7 0.6   AEOS  --   --   --  0.3  --  0.3 0.3 0.3  --  0.3 0.4   HGB 9.0*  --  8.0* 7.6*  --  10.2* 10.1* 9.5*   < > 9.3* 9.3*   HCT 28.8*  --  25.7* 24.2*  --  32.7* 32.8* 28.5*   < > 29.9* 31.0*    247 228 234   < > 290 284 279   < > 251 244    < > = values in this interval not displayed.       Clotting Studies    Recent Labs   Lab Test 09/23/19  1534 05/08/19  0504 05/07/19  0451 05/06/19  0438  03/14/19  1653  02/27/19  0421 02/25/19  1502   INR 1.17* 1.22* 1.33* 1.31*   < > 1.36*   < >  --  1.29*   PTT 35  --   --   --   --  46*  --  38* 32    < > = values in this interval not displayed.       Arterial Blood Gas Testing    Recent Labs   Lab Test 03/11/19  1152 03/10/19  1214 03/09/19  0400 03/08/19  2147  02/25/19  1717 02/25/19  1502 02/25/19  0918  02/25/19  0350 02/25/19  0110   PH  --   --   --   --   --  7.32* 7.31* 7.33*  --  7.39 7.35   PCO2  --   --   --   --    --  36 51* 46*  --  42 42   PO2  --   --   --   --   --  110* 75* 125*  --  87 170*   HCO3  --   --   --   --   --  19* 26 24  --  25 23   O2PER 21 21 21 21.0   < > 3L 2  2 40.0  40.0   < > 40.0  40.0 60.0    < > = values in this interval not displayed.        Urine Studies     Recent Labs   Lab Test 05/26/19  1006 04/29/19  1031 03/26/19  1707 03/17/19  0045 03/04/19  1303   URINEPH 6.5 6.0 5.5 5.5 5.0   NITRITE Negative Negative Negative Negative Negative   LEUKEST Negative Negative Negative Negative Negative   WBCU 0 <1 2 15* 0       Vancomycin Levels     Recent Labs   Lab Test 05/02/19  1519   VANCOMYCIN 10.0       Tacrolimus levels    Invalid input(s): TACROLIMUS, TAC, TACR  Transplant Immunosuppression Labs Latest Ref Rng & Units 9/26/2019 9/25/2019 9/24/2019 9/24/2019 9/24/2019   Tacro Level 5.0 - 15.0 ug/L 3.1(L) - - - 7.0   Tacro Level - Not Provided - - - Not Provided   Creat 0.66 - 1.25 mg/dL 1.56(H) 1.61(H) 1.66(H) - 1.78(H)   BUN 7 - 30 mg/dL 17 26 30 - 40(H)   WBC 4.0 - 11.0 10e9/L 4.2 3.5(L) - 3.6(L) -   Neutrophil % - - - 53.8 -   ANEU 1.6 - 8.3 10e9/L - - - 1.9 -       Microbiology:  Blood cx negative to date.   Last check of C difficile  C Diff Toxin B PCR   Date Value Ref Range Status   09/20/2019 Positive (A) NEG^Negative Final     Comment:     Positive: Toxin producing C. difficile target DNA sequences detected, presumed   positive for C. difficile toxin B. C. difficile (Requires Enteric Isolation).      Clostridium difficile (Requires Enteric Isolation)  FDA approved assay performed using [employer] GeneXpert real-time PCR.         Virology:  CMV viral loads  No results found for: 82526, 07983, 78365, 89406  CMV viral loads    Recent Labs   Lab Test 09/26/19  0424 09/17/19  0856   CSPEC EDTA PLASMA Plasma   CMVLOG 3.0* 3.9*       CMV viral loads    Log IU/mL of CMVQNT   Date Value Ref Range Status   09/26/2019 3.0 (H) <2.1 [Log_IU]/mL Final   09/17/2019 3.9 (H) <2.1 [Log_IU]/mL Final    07/24/2019 3.5 (H) <2.1 [Log_IU]/mL Final   06/26/2019 <2.1 <2.1 [Log_IU]/mL Final   06/04/2019 Not Calculated <2.1 [Log_IU]/mL Final   05/24/2019 Not Calculated <2.1 [Log_IU]/mL Final   05/22/2019 Not Calculated <2.1 [Log_IU]/mL Final   05/15/2019 Not Calculated <2.1 [Log_IU]/mL Final   05/06/2019 Not Calculated <2.1 [Log_IU]/mL Final   04/30/2019 Not Calculated <2.1 [Log_IU]/mL Final   04/29/2019 Not Calculated <2.1 [Log_IU]/mL Final   04/26/2019 Not Calculated <2.1 [Log_IU]/mL Final   04/24/2019 Not Calculated <2.1 [Log_IU]/mL Final   04/17/2019 Not Calculated <2.1 [Log_IU]/mL Final   04/08/2019 Not Calculated <2.1 [Log_IU]/mL Final   04/01/2019 Not Calculated <2.1 [Log_IU]/mL Final   03/25/2019 Not Calculated <2.1 [Log_IU]/mL Final   03/18/2019 Not Calculated <2.1 [Log_IU]/mL Final       CMV resistance testing  No lab results found.  No results found for: CMVCID, CMVFOS, CMVGAN     No results found for: H6RES    EBV DNA Copies/mL   Date Value Ref Range Status   09/17/2019 EBV DNA Not Detected EBVNEG^EBV DNA Not Detected [Copies]/mL Final   05/22/2019 541 (A) EBVNEG^EBV DNA Not Detected [Copies]/mL Final   04/29/2019 EBV DNA Not Detected EBVNEG^EBV DNA Not Detected [Copies]/mL Final   04/26/2019 EBV DNA Not Detected EBVNEG^EBV DNA Not Detected [Copies]/mL Final   03/27/2019 EBV DNA Not Detected EBVNEG^EBV DNA Not Detected [Copies]/mL Final       CMV Antibody IgG   Date Value Ref Range Status   02/23/2019 >8.0 (H) 0.0 - 0.8 AI Final     Comment:     Positive  Antibody index (AI) values reflect qualitative changes in antibody   concentration that cannot be directly associated with clinical condition or   disease state.     02/15/2019 >8.0 (H) 0.0 - 0.8 AI Final     Comment:     Positive  Antibody index (AI) values reflect qualitative changes in antibody   concentration that cannot be directly associated with clinical condition or   disease state.         Toxoplasma Antibody IgG   Date Value Ref Range Status    02/22/2019 <3.0 0.0 - 7.1 IU/mL Final     Comment:     Negative- Absence of detectable Toxoplasma gondii IgG antibodies. A negative   result does not rule out acute infection.  The magnitude of the measured result is not indicative of the amount of   antibody present. The concentrations of anti-Toxoplasma gondii IgG in a given   specimen determined with assays from different manufacturers can vary due to   differences in assay methods and reagent specificity.         Imaging:  CT chest/abdomen/pelvis 9/23/2019   IMPRESSION:  1.  Continued decrease in size of left upper lung lesion with residual  nodularity, previously measured 10 x 8 mm, suggestive of  infectious/inflammatory process. No new pulmonary lesions.  2.  Limited evaluation of abdomen and pelvis due to lack of IV  contrast. Nonspecific numerous subcentimeter mesenteric and  retroperitoneal lymph nodes, likely reactive.  3.  Resolution of ascites.  4.  Blood pool density lower than myocardium, compatible with  patient's known anemia.        Ric Shukla MD  Pager: [phone]  09/26/2019

## 2019-09-26 NOTE — PLAN OF CARE
Neuro: A&Ox4. Call light with reach.  Pt slept most of night.  Cardiac: SR-ST . VSS.             Respiratory: Sating >92% on RA.  GI/: Adequate urine output. Multiple watery liquid BMs.   Diet/appetite: Tolerating regular diet without caffeine. Fair appetite.  Activity:  Independent.   Pain: At acceptable level on current regimen.   Skin: No new deficits noted.  LDA's: Rt PIV      Will monitor pt condition and follow up.  Update team as needed.

## 2019-09-26 NOTE — PROGRESS NOTES
Calorie Count  Intake recorded for: 9/25  Total Kcals: 1116 Total Protein: 33g  Kcals from Hospital Food: 1116  Protein: 33g  Kcals from Outside Food (average):0 Protein: 0g  # Meals Recorded: 100% pudding, pancake w/ syrup, sausage & cheese omelet   # Supplements Recorded: 0

## 2019-09-26 NOTE — PLAN OF CARE
Neuro: A&Ox4.   Cardiac: SR. VSS.   Respiratory: Sating 98% on RA.  GI/: Adequate urine output. Several BMs  Diet/appetite: Tolerating diet. Poor appetite.  Activity:  Up independently.  Pain: At acceptable level on current regimen.   Skin: No new deficits noted.  LDA's: None    Plan: Continue with POC. Notify primary team with changes.

## 2019-09-26 NOTE — PROGRESS NOTES
Cardiology Progress Note    Assessment & Plan   54 year old male with a history of NICM s/p OHT (2/24/2019), paroxysmal atrial fibrillation, recent T12 compression fracture (4/21/2019), ulcerative colitis, Pierce's esophagus, hypothyroidism, GERD, BPH and depression who is admitted for further evaluation and management acute kidney injury in the setting of worsening CMV viremia and newly diagnosed clostridium difficile.    Plan for today  - continue valccyclovir and PO vanc  - cyclosporine will be decreased to 1000 BID WBC stable at 3.5 if lower will discuss decreasing dose.   - CMV PCR ordered for today   - tacro level low today at 3.1 will increase dose to 3/3 and recheck level sat 9/28/19      #Clostridium Difficile Colitis  #CMV Viremia  - transplant ID consultation  - continue valacyclovir 125 mg daily  - start PO vancomycin 125 mg QID X10 days      #Acute renal injury:- resolving     #Ulcerative colitis:   - gastroenterology consultation- appreciate input, no plan for scope at this time, ok to discharge from their stand point they will arrange follow up with Dr Allen   - continue home mesalamine 800 mg TID     #Left breast pain / swelling under left nipple  - discussed with staff radiologist who agrees this area on CT appears a little more prominent compared to the right side, but does not appear concerning for abscess or other malignant pathology (no new suspicious lymph nodes, etc)  - ultrasound showed gynecomastia      #Nonischemic cardiomyopathy s/p OHT (2/24/2019)  - Most recent biopsy 8/13/19 1R (stable)  - Serostatus: CMV+, EBV+, HSV1+, HSV2 neg, VZV+  - Immunosuppresives:               -  home Cellcept 1,500 mg BID will be decreased to 1000 BID WBC stable at 3.5 if lower will discuss decreasing dose.               - Tacro level was 7 (probably true is slightly lower than this, goal is 6-8) started tacro 2mg BID 9/24  - PPx: none, therapeutic valacyclovir as above  - Diuretics: none  - ASA 81mg  "daily  - Rosuvastatin 10mg daily     # P aeruginosa BSI 4/29/19 with P aeruginosa HCAP with abnormal improving nodulres per CT Chest  - s/p katerin 5/6/19-6/3/19 (4 weeks) in case this was aspergillosis   - now improving on CT per ID could all be sequelae of his P aeruginosa.      #Hypothyroidism: continue levothyroxine, last TSH was low-recheck in AM  #GERD and history of Pierce's esophagus: continue omeprazole   #Depression: continue duloxetine   #Seasonal allergies: fluticasone, montelukast   #Insomnia: continue melatonin   #Hypertension: continue amlodipine 5 mg    Diet/IVF: general as tolerated  DVT ppx: heparin SQ  Disposition/Admission Status:2-3d  CODE: Full Code        Sena Cam    Interval History   Decreased bowel movement frequency   Persists with some abdominal pain and nausea       Physical Exam   Temp: 98.2  F (36.8  C) Temp src: Oral BP: 115/73 Pulse: 104 Heart Rate: 95 Resp: 16 SpO2: 100 % O2 Device: None (Room air) Oxygen Delivery: 2 LPM  Vitals:    09/23/19 2054 09/24/19 0000 09/25/19 0100   Weight: 53.3 kg (117 lb 8.1 oz) 53.3 kg (117 lb 8.1 oz) 54.7 kg (120 lb 9.5 oz)     Vital Signs with Ranges  Temp:  [98  F (36.7  C)-98.9  F (37.2  C)] 98.2  F (36.8  C)  Pulse:  [] 104  Heart Rate:  [] 95  Resp:  [14-18] 16  BP: (105-126)/(68-82) 115/73  SpO2:  [97 %-100 %] 100 %  I/O last 3 completed shifts:  In: 860 [P.O.:860]  Out: 1220 [Urine:1220]    Heart Rate: 95, Blood pressure 115/73, pulse 104, temperature 98.2  F (36.8  C), temperature source Oral, resp. rate 16, height 1.727 m (5' 8\"), weight 54.7 kg (120 lb 9.5 oz), SpO2 100 %.  120 lbs 9.47 oz  GEN:  Alert, oriented x 3, appears comfortable, NAD.  CV:  Regular rate and rhythm, tachy no murmur or JVD.  S1 + S2 noted, no S3 or S4.  LUNGS:  Clear to auscultation bilaterally   ABD:  Soft but tender to palpation  EXT:  No edema or cyanosis.      Medications     - MEDICATION INSTRUCTIONS -       - MEDICATION INSTRUCTIONS -   "       amLODIPine  5 mg Oral Daily     aspirin  81 mg Oral Daily     calcium carbonate 600 mg-vitamin D 400 units  1 tablet Oral BID w/meals     DULoxetine  20 mg Oral Daily     heparin  5,000 Units Subcutaneous Q12H     levothyroxine  100 mcg Oral Daily     melatonin  6 mg Oral At Bedtime     mesalamine  800 mg Oral TID     montelukast  10 mg Oral At Bedtime     mycophenolate  1,000 mg Oral BID     omeprazole  40 mg Oral Daily     rosuvastatin  10 mg Oral Daily     sodium chloride (PF)  3 mL Intracatheter Q8H     tacrolimus  2 mg Oral BID IS     valGANciclovir  450 mg Oral Daily     vancomycin  125 mg Oral 4x Daily       Data   Recent Labs   Lab 09/26/19  0424 09/25/19  0507 09/24/19  1947 09/24/19  0437 09/24/19  0435 09/23/19  1534   WBC  --  3.5*  --  3.6*  --   --  4.6   HGB  --  8.0*  --  7.6*  --   --  10.2*   MCV  --  82  --  82  --   --  82    228  --  234  --    < > 290   INR  --   --   --   --   --   --  1.17*   NA  --  138 137  --  138   < > 134   POTASSIUM  --  4.0 3.9  --  3.9   < > 4.6   CHLORIDE  --  116* 114*  --  116*   < > 108   CO2  --  14* 15*  --  12*   < > 15*   BUN  --  26 30  --  40*   < > 47*   CR  --  1.61* 1.66*  --  1.78*   < > 2.41*   ANIONGAP  --  8 8  --  11   < > 11   RADAMES  --  8.5 8.1*  --  7.6*   < > 9.0   GLC  --  90 116*  --  84   < > 96   ALBUMIN  --  3.1*  --   --  2.9*  --  4.0   PROTTOTAL  --  5.7*  --   --  5.4*  --  7.4   BILITOTAL  --  0.3  --   --  0.4  --  0.4   ALKPHOS  --  237*  --   --  218*  --  301*   ALT  --  15  --   --  14  --  17   AST  --  11  --   --  11  --  16   LIPASE  --   --   --   --   --   --  151   TROPI  --   --   --   --   --   --  <0.015    < > = values in this interval not displayed.       No results found for this or any previous visit (from the past 24 hour(s)).

## 2019-09-26 NOTE — PLAN OF CARE
NEURO: alert and oriented x4.  RESPIRATORY: LS dimished in the bases.  SpO2>92% on RA.   CARDIO: Sinus rhythm- 90s. VSS.   GI/: BS active, +1 loose stool. Voiding without difficulty. Poor oral intake.   SKIN: Cachexic.   ACTIVITY: Up ad janeth.   PAIN: Reported pain in abdomen, given tylenol and tramadol.   LINES: PIV SL- sites WNL.   PLAN:  Continue POC- notify MD with concerns.

## 2019-09-27 ENCOUNTER — AMBULATORY - HEALTHEAST (OUTPATIENT)
Dept: CARDIOLOGY | Facility: CLINIC | Age: 54
End: 2019-09-27

## 2019-09-27 LAB
ALBUMIN SERPL-MCNC: 3 G/DL (ref 3.4–5)
ALP SERPL-CCNC: 233 U/L (ref 40–150)
ALT SERPL W P-5'-P-CCNC: 14 U/L (ref 0–70)
ANION GAP SERPL CALCULATED.3IONS-SCNC: 11 MMOL/L (ref 3–14)
AST SERPL W P-5'-P-CCNC: 10 U/L (ref 0–45)
BASOPHILS # BLD AUTO: 0 10E9/L (ref 0–0.2)
BASOPHILS NFR BLD AUTO: 0 %
BILIRUB SERPL-MCNC: 0.4 MG/DL (ref 0.2–1.3)
BUN SERPL-MCNC: 18 MG/DL (ref 7–30)
CALCIUM SERPL-MCNC: 8.3 MG/DL (ref 8.5–10.1)
CHLORIDE SERPL-SCNC: 111 MMOL/L (ref 94–109)
CO2 SERPL-SCNC: 12 MMOL/L (ref 20–32)
CREAT SERPL-MCNC: 1.49 MG/DL (ref 0.66–1.25)
DIFFERENTIAL METHOD BLD: ABNORMAL
EOSINOPHIL # BLD AUTO: 0.1 10E9/L (ref 0–0.7)
EOSINOPHIL NFR BLD AUTO: 1.9 %
ERYTHROCYTE [DISTWIDTH] IN BLOOD BY AUTOMATED COUNT: 14.2 % (ref 10–15)
FERRITIN SERPL-MCNC: 511 NG/ML (ref 26–388)
GFR SERPL CREATININE-BSD FRML MDRD: 52 ML/MIN/{1.73_M2}
GLUCOSE SERPL-MCNC: 74 MG/DL (ref 70–99)
HCT VFR BLD AUTO: 24.2 % (ref 40–53)
HGB BLD-MCNC: 7.7 G/DL (ref 13.3–17.7)
IRON SATN MFR SERPL: 10 % (ref 15–46)
IRON SERPL-MCNC: 22 UG/DL (ref 35–180)
LYMPHOCYTES # BLD AUTO: 0.9 10E9/L (ref 0.8–5.3)
LYMPHOCYTES NFR BLD AUTO: 22.2 %
MCH RBC QN AUTO: 25.8 PG (ref 26.5–33)
MCHC RBC AUTO-ENTMCNC: 31.8 G/DL (ref 31.5–36.5)
MCV RBC AUTO: 81 FL (ref 78–100)
METAMYELOCYTES # BLD: 0 10E9/L
METAMYELOCYTES NFR BLD MANUAL: 0.9 %
MICROSPORID STL TRI STN: NORMAL
MONOCYTES # BLD AUTO: 0.1 10E9/L (ref 0–1.3)
MONOCYTES NFR BLD AUTO: 2.8 %
NEUTROPHILS # BLD AUTO: 2.9 10E9/L (ref 1.6–8.3)
NEUTROPHILS NFR BLD AUTO: 72.2 %
OVALOCYTES BLD QL SMEAR: SLIGHT
PLATELET # BLD AUTO: 231 10E9/L (ref 150–450)
POIKILOCYTOSIS BLD QL SMEAR: SLIGHT
POTASSIUM SERPL-SCNC: 3.9 MMOL/L (ref 3.4–5.3)
PROT SERPL-MCNC: 5.9 G/DL (ref 6.8–8.8)
RBC # BLD AUTO: 2.98 10E12/L (ref 4.4–5.9)
SODIUM SERPL-SCNC: 135 MMOL/L (ref 133–144)
SPECIMEN SOURCE: NORMAL
TACROLIMUS BLD-MCNC: 6.1 UG/L (ref 5–15)
TIBC SERPL-MCNC: 225 UG/DL (ref 240–430)
TME LAST DOSE: NORMAL H
WBC # BLD AUTO: 4 10E9/L (ref 4–11)

## 2019-09-27 PROCEDURE — 25000131 ZZH RX MED GY IP 250 OP 636 PS 637: Performed by: INTERNAL MEDICINE

## 2019-09-27 PROCEDURE — 93010 ELECTROCARDIOGRAM REPORT: CPT | Performed by: INTERNAL MEDICINE

## 2019-09-27 PROCEDURE — 83993 ASSAY FOR CALPROTECTIN FECAL: CPT | Performed by: NURSE PRACTITIONER

## 2019-09-27 PROCEDURE — 12000012 ZZH R&B MS OVERFLOW UMMC

## 2019-09-27 PROCEDURE — 25000132 ZZH RX MED GY IP 250 OP 250 PS 637: Performed by: STUDENT IN AN ORGANIZED HEALTH CARE EDUCATION/TRAINING PROGRAM

## 2019-09-27 PROCEDURE — 99232 SBSQ HOSP IP/OBS MODERATE 35: CPT | Mod: GC | Performed by: INTERNAL MEDICINE

## 2019-09-27 PROCEDURE — 83540 ASSAY OF IRON: CPT | Performed by: STUDENT IN AN ORGANIZED HEALTH CARE EDUCATION/TRAINING PROGRAM

## 2019-09-27 PROCEDURE — 36415 COLL VENOUS BLD VENIPUNCTURE: CPT | Performed by: STUDENT IN AN ORGANIZED HEALTH CARE EDUCATION/TRAINING PROGRAM

## 2019-09-27 PROCEDURE — 80197 ASSAY OF TACROLIMUS: CPT | Performed by: INTERNAL MEDICINE

## 2019-09-27 PROCEDURE — 25000131 ZZH RX MED GY IP 250 OP 636 PS 637: Performed by: STUDENT IN AN ORGANIZED HEALTH CARE EDUCATION/TRAINING PROGRAM

## 2019-09-27 PROCEDURE — 85025 COMPLETE CBC W/AUTO DIFF WBC: CPT | Performed by: STUDENT IN AN ORGANIZED HEALTH CARE EDUCATION/TRAINING PROGRAM

## 2019-09-27 PROCEDURE — 93005 ELECTROCARDIOGRAM TRACING: CPT

## 2019-09-27 PROCEDURE — 80053 COMPREHEN METABOLIC PANEL: CPT | Performed by: STUDENT IN AN ORGANIZED HEALTH CARE EDUCATION/TRAINING PROGRAM

## 2019-09-27 PROCEDURE — 82728 ASSAY OF FERRITIN: CPT | Performed by: STUDENT IN AN ORGANIZED HEALTH CARE EDUCATION/TRAINING PROGRAM

## 2019-09-27 PROCEDURE — 25000132 ZZH RX MED GY IP 250 OP 250 PS 637: Performed by: NURSE PRACTITIONER

## 2019-09-27 PROCEDURE — 83550 IRON BINDING TEST: CPT | Performed by: STUDENT IN AN ORGANIZED HEALTH CARE EDUCATION/TRAINING PROGRAM

## 2019-09-27 RX ORDER — PROMETHAZINE HYDROCHLORIDE 12.5 MG/1
12.5 SUPPOSITORY RECTAL EVERY 6 HOURS PRN
Status: DISCONTINUED | OUTPATIENT
Start: 2019-09-27 | End: 2019-09-28 | Stop reason: HOSPADM

## 2019-09-27 RX ORDER — MYCOPHENOLATE MOFETIL 250 MG/1
1500 CAPSULE ORAL 2 TIMES DAILY
Status: DISCONTINUED | OUTPATIENT
Start: 2019-09-27 | End: 2019-09-28 | Stop reason: HOSPADM

## 2019-09-27 RX ADMIN — Medication 1 TABLET: at 18:11

## 2019-09-27 RX ADMIN — MESALAMINE 800 MG: 800 TABLET, DELAYED RELEASE ORAL at 09:33

## 2019-09-27 RX ADMIN — VANCOMYCIN HYDROCHLORIDE 125 MG: KIT at 09:34

## 2019-09-27 RX ADMIN — DULOXETINE HYDROCHLORIDE 20 MG: 20 CAPSULE, DELAYED RELEASE ORAL at 09:33

## 2019-09-27 RX ADMIN — TACROLIMUS 3 MG: 1 CAPSULE ORAL at 09:33

## 2019-09-27 RX ADMIN — VANCOMYCIN HYDROCHLORIDE 125 MG: KIT at 15:49

## 2019-09-27 RX ADMIN — OMEPRAZOLE 40 MG: 20 CAPSULE, DELAYED RELEASE ORAL at 09:33

## 2019-09-27 RX ADMIN — VANCOMYCIN HYDROCHLORIDE 125 MG: KIT at 19:58

## 2019-09-27 RX ADMIN — MONTELUKAST 10 MG: 10 TABLET, FILM COATED ORAL at 19:57

## 2019-09-27 RX ADMIN — MESALAMINE 800 MG: 800 TABLET, DELAYED RELEASE ORAL at 19:57

## 2019-09-27 RX ADMIN — VALGANCICLOVIR 450 MG: 450 TABLET, FILM COATED ORAL at 19:57

## 2019-09-27 RX ADMIN — MYCOPHENOLATE MOFETIL 1500 MG: 250 CAPSULE ORAL at 19:56

## 2019-09-27 RX ADMIN — LEVOTHYROXINE SODIUM 100 MCG: 100 TABLET ORAL at 09:35

## 2019-09-27 RX ADMIN — MYCOPHENOLATE MOFETIL 1000 MG: 250 CAPSULE ORAL at 09:34

## 2019-09-27 RX ADMIN — TACROLIMUS 3 MG: 1 CAPSULE ORAL at 18:11

## 2019-09-27 RX ADMIN — Medication 1 TABLET: at 09:33

## 2019-09-27 RX ADMIN — VANCOMYCIN HYDROCHLORIDE 125 MG: KIT at 11:59

## 2019-09-27 RX ADMIN — ROSUVASTATIN CALCIUM 10 MG: 10 TABLET, FILM COATED ORAL at 09:34

## 2019-09-27 RX ADMIN — AMLODIPINE BESYLATE 5 MG: 5 TABLET ORAL at 09:34

## 2019-09-27 RX ADMIN — MESALAMINE 800 MG: 800 TABLET, DELAYED RELEASE ORAL at 14:06

## 2019-09-27 RX ADMIN — VALGANCICLOVIR 450 MG: 450 TABLET, FILM COATED ORAL at 09:33

## 2019-09-27 RX ADMIN — MELATONIN TAB 3 MG 6 MG: 3 TAB at 19:56

## 2019-09-27 RX ADMIN — ASPIRIN 81 MG CHEWABLE TABLET 81 MG: 81 TABLET CHEWABLE at 09:34

## 2019-09-27 ASSESSMENT — MIFFLIN-ST. JEOR: SCORE: 1337.5

## 2019-09-27 ASSESSMENT — ACTIVITIES OF DAILY LIVING (ADL)
ADLS_ACUITY_SCORE: 11

## 2019-09-27 NOTE — PROGRESS NOTES
GASTROENTEROLOGY PROGRESS NOTE       Patient Summary   Everton Larios is a 54 year old male with a past medical history of ulcerative pancolitis diagnosed in 2005 (on Balsalazide), CMV viremia, C. Diff on 9/20/19, ASD s/p repair in childhood, NICM from congenital heart disease s/p OHT on 2/24/19, A. fib s/p ablation, immunosuppressed state (on tacrolimus, MMF and prednisone), Luisito's esophagus w/o dysplasia, asthma, osteoporosis, T12 compression fracture on 4/21/19, depression, and now being admitted for abdominal pain, nausea/vomiting and diarrhea.        ASSESSMENT AND RECOMMENDATIONS:   #C. Difficile  #Acute on chronic diarrhea   #Nausea and vomiting   #Abdominal pain  Patient had L-sided/RLQ abdominal pian, nausea/vomiting and diarrhea since mid August 2019, but symptoms have worsened since Friday 9/20/19.  Abdominal pain is cramping in nature, due to nausea/vomiting he has been throwing up most of oral intake, and stools have been over 10 brown-liquid stools/day without any blood as he would usually see with his previous ulcerative colitis flare.  He is afebrile and no leukocytosis, but needs to be taken into consideration his immunosuppression status.       Patent is complaining increased abdominal pain, nausea, vomiting, and diarrhea since yesterday. On exam normal bowel sounds and abdomen is soft. Abdominal x-ray from 9/26/19 showed non-obstructive bowel gas pattern w/o pneumatosis or portal venous gas. FRANKLIN improved creatinine at 1.56. Albumin 3.4/protein 6.7 showing nutritional deficiency improved. Unclear why patient is having increased symptoms, plan is to continue with C. Diff infection treatment.      #Ulcerative pancolitis:  Previoulsy quiescent since 2017 on Mesalamine monotherapy. CRP is <2.7, and no blood in stool.      #Elevated alkaline phosphatase:  Rising since 6/17/19, now 237. Patient needs further evaluation for PSC with MRCP given history of UC. Total bilirubin, AST/ALT remain  normal. Will continue to monitor LFTs.      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 Next EGD is due in 2020. Will continue with PPI.      Recommendations  --Diet as tolerated  --Continue with 125mg of oral vancomycin X4/day for total of 10 days  --Rectal for nausea and vomiting  --Avoid narcotics as these medications increase the risk of mortality in IBD patients  --Continue with home Mesalamine   --Patient will need DVT prophylaxis as IBD patients are in hypercoagulable state (on Heparin d/t coming in with FRANKLIN)   --Accurate documentation of output (color, characteristics, and amount)    Gastroenterology follow up recommendations:  --Patient needs to call back GI office to arrange a follow up appointment with Dr. Allen    Pt care plan discussed with Dr. Pascual, GI staff physician. Thank you for involving us in this patient's care. Please do not hesitate to contact the GI service with any questions or concerns.    [facility]  Department of Gastroenterology   P: [phone]  Subjective\events within the 24 hours:   According to patient he is having multiple stools and emesis but nurses are not aware. Discussed to safe all output for the nurses.      4 point ROS performed and negative unless noted above.           Medications:     Current Facility-Administered Medications   Medication     acetaminophen (TYLENOL) Suppository 650 mg     acetaminophen (TYLENOL) tablet 650 mg     albuterol (PROAIR HFA/PROVENTIL HFA/VENTOLIN HFA) 108 (90 Base) MCG/ACT inhaler 2 puff     amLODIPine (NORVASC) tablet 5 mg     aspirin (ASA) chewable tablet 81 mg     calcium carbonate 600 mg-vitamin D 400 units (CALTRATE) per tablet 1 tablet     DULoxetine (CYMBALTA) DR capsule 20 mg     fluticasone (FLONASE) 50 MCG/ACT spray 1 spray     levothyroxine (SYNTHROID/LEVOTHROID)  "tablet 100 mcg     lidocaine (LMX4) cream     lidocaine 1 % 0.1-1 mL     medication instruction     melatonin tablet 6 mg     mesalamine (ASACOL HD) EC tablet 800 mg     montelukast (SINGULAIR) tablet 10 mg     mycophenolate (GENERIC EQUIVALENT) capsule 1,500 mg     naloxone (NARCAN) injection 0.1-0.4 mg     nitroGLYcerin (NITROSTAT) sublingual tablet 0.4 mg     omeprazole (priLOSEC) CR capsule 40 mg     ondansetron (ZOFRAN) injection 4 mg     ondansetron (ZOFRAN-ODT) ODT tab 4 mg     Patient is NOT allergic to contrast dye OR was given pre-procedure oral steroids for treatment     promethazine (PHENERGAN) Suppository 12.5 mg     rosuvastatin (CRESTOR) tablet 10 mg     sodium chloride (PF) 0.9% PF flush 3 mL     sodium chloride (PF) 0.9% PF flush 3 mL     tacrolimus (GENERIC EQUIVALENT) capsule 3 mg     traMADol (ULTRAM) half-tab 25 mg     valGANciclovir (VALCYTE) tablet 450 mg     vancomycin (FIRVANQ) oral solution 125 mg        Physical Exam   Blood pressure 107/72, pulse 95, temperature 98.4  F (36.9  C), temperature source Oral, resp. rate 17, height 1.727 m (5' 8\"), weight 53.3 kg (117 lb 8.1 oz), SpO2 100 %.    Constitutional: cooperative, pleasant, not dyspneic/diaphoretic, no acute distress  Eyes: Sclera anicteric/injected  Ears/nose/mouth/throat: Normal oropharynx without ulcers or exudate, mucus membranes moist, hearing intact  Neck: supple, thyroid normal size  CV: RRR. No edema in LE bilaterally   Respiratory: Unlabored breathing. Slightly diminished lower lobes bilaterally  Lymph: No axillary, submandibular, supraclavicular or inguinal lymphadenopathy  Abd: Nondistended, +bs, no hepatosplenomegaly, nontender, no peritoneal signs  Skin: warm, perfused, no jaundice  Neuro: AAO x 3, No asterixis  Psych: Normal affect  MSK: No gross deformities     Data   Current Labs  CBC  Recent Labs   Lab 09/27/19  0446 09/26/19  1127 09/26/19  0424 09/25/19  0507 09/24/19  0437   WBC 4.0 4.2  --  3.5* 3.6*   RBC 2.98* " 3.51*  --  3.13* 2.95*   HGB 7.7* 9.0*  --  8.0* 7.6*   HCT 24.2* 28.8*  --  25.7* 24.2*   MCV 81 82  --  82 82   MCH 25.8* 25.6*  --  25.6* 25.8*   MCHC 31.8 31.3*  --  31.1* 31.4*   RDW 14.2 14.3  --  14.1 14.0    243 247 228 234     BMP  Recent Labs   Lab 09/27/19 0446 09/26/19  1127 09/25/19  0507 09/24/19  1947  09/23/19  1534    136 138 137   < > 134   POTASSIUM 3.9 4.0 4.0 3.9   < > 4.6   CHLORIDE 111* 110* 116* 114*   < > 108   CO2 12* 15* 14* 15*   < > 15*   ANIONGAP 11 11 8 8   < > 11   GLC 74 87 90 116*   < > 96   BUN 18 17 26 30   < > 47*   CR 1.49* 1.56* 1.61* 1.66*   < > 2.41*   GFRESTIMATED 52* 49* 47* 45*   < > 29*   GFRESTBLACK 60* 57* 54* 52*   < > 33*   RADAMES 8.3* 8.6 8.5 8.1*   < > 9.0   MAG  --   --   --   --   --  2.0   PHOS  --   --   --   --   --  4.8*    < > = values in this interval not displayed.      INR  Recent Labs   Lab 09/23/19  1534   INR 1.17*     Liver panel  Recent Labs   Lab 09/27/19 0446 09/26/19  1127 09/25/19  0507 09/24/19  0435   PROTTOTAL 5.9* 6.7* 5.7* 5.4*   ALBUMIN 3.0* 3.4 3.1* 2.9*   BILITOTAL 0.4 0.4 0.3 0.4   ALKPHOS 233* 272* 237* 218*   AST 10 15 11 11   ALT 14 17 15 14                 History of Present Illness:   Everton Larios is a 54 year old male with a history of ulcerative pancolitis diagnosed in 2005 (on Balsalazide), CMV viremia, C. Diff on 9/20/19, ASD s/p repair in childhood, NICM from congenital heart disease s/p OHT on 2/24/19, immunosuppressed state (on tacrolimus, MMF and prednisone), Luisito's esophagus w/o dysplasia, osteoporosis and now being admitted for abdominal pain, nausea/vomiting and diarrhea.      Patient had L-sided/RLQ abdominal pian, nausea/vomiting and diarrhea since mid August 2019, but symptoms have worsened since Friday 9/20/19.  Abdominal pain is cramping in nature, due to nausea/vomiting he has been throwing up most of oral intake without blood/coffee ground emesis, and stools have been over 10 brown-liquid  stools/day without any blood as he would usually see with his previous ulcerative colitis flare.  Occasionally he was able to keep bites of sandwich and liquid but not much.  Patient noted having chills since transplant which has not been worse than before.  He denies fever or body aches.  He reports having cough with white sputum for approximately 2 weeks.  He supposed to get oral vancomycin on Sunday but pharmacy was closed.  Today he called the clinic to notify how he is feeling and he was directed to come to the emergency department.  No NSAID, tobacco, or alcohol. Patient denies recent travel.use or being exposed to sick people besides the multiple hospitalization that he had recently.

## 2019-09-27 NOTE — PROGRESS NOTES
Calorie Count  Intake recorded for: 9/26  Total Kcals: 921 Total Protein: 13g  Kcals from Hospital Food: 921  Protein: 13g  Kcals from Outside Food (average):0 Protein: 0g  # Meals Recorded: 100% 2 puddings, cream of wheat, 1 Guyanese toast w/ syrup, 50% English muffin)  # Supplements Recorded: 0

## 2019-09-27 NOTE — PROGRESS NOTES
Cardiology Progress Note      Faculty Attestation  Rosendo Beck M.D.    I personally saw and examined this patient, reviewed recent laboratories and imaging studies, discussed the case with the housestaff, and conveyed plan to the patient.  I answered all questions from patient and/or family. I agree with the examination, assessment and plan outlined here.      Assessment & Plan   54 year old male with a history of NICM s/p OHT (2/24/2019), paroxysmal atrial fibrillation, recent T12 compression fracture (4/21/2019), ulcerative colitis, Pierce's esophagus, hypothyroidism, GERD, BPH and depression who is admitted for further evaluation and management acute kidney injury in the setting of worsening CMV viremia and newly diagnosed clostridium difficile.    Plan for today  - continue valccyclovir and PO vanc  - consider increasing cyclosporine back to 1500mg BID now that WBC is back at 4   - CMV PCR ordered and pending  - tacro level low today at 3.1 will increase dose to 3/3 and recheck level sat 9/28/19   - if pain persists will call gi again given that the pain persists and there is some distension in his KUB- discussed with GI no concerning signs on labs or physical exam ok to discharge recommend phenergan enemas for his nausea      #Clostridium Difficile Colitis  #CMV Viremia  - transplant ID consultation  - continue valacyclovir 125 mg daily  - start PO vancomycin 125 mg QID X10 days      #Acute renal injury:- resolving     #Ulcerative colitis:   - gastroenterology consultation- appreciate input, no plan for scope at this time, ok to discharge from their stand point they will arrange follow up with Dr Allen   - continue home mesalamine 800 mg TID     #Left breast pain / swelling under left nipple  - discussed with staff radiologist who agrees this area on CT appears a little more prominent compared to the right side, but does not appear concerning for abscess or other malignant pathology (no new suspicious  lymph nodes, etc)  - ultrasound showed gynecomastia      #Nonischemic cardiomyopathy s/p OHT (2/24/2019)  - Most recent biopsy 8/13/19 1R (stable)  - Serostatus: CMV+, EBV+, HSV1+, HSV2 neg, VZV+  - Immunosuppresives:               -  home Cellcept 1,500 mg BID will be decreased to 1000 BID WBC stable at 3.5 if lower will discuss decreasing dose.               - Tacro level was 7 (probably true is slightly lower than this, goal is 6-8) started tacro 2mg BID 9/24- increased to 3mg BID 9/26  - PPx: none, therapeutic valacyclovir as above  - Diuretics: none  - ASA 81mg daily  - Rosuvastatin 10mg daily     # P aeruginosa BSI 4/29/19 with P aeruginosa HCAP with abnormal improving nodulres per CT Chest  - s/p katerin 5/6/19-6/3/19 (4 weeks) in case this was aspergillosis   - now improving on CT per ID could all be sequelae of his P aeruginosa.      #Hypothyroidism: continue levothyroxine, last TSH was low-recheck in AM  #GERD and history of Pierce's esophagus: continue omeprazole   #Depression: continue duloxetine   #Seasonal allergies: fluticasone, montelukast   #Insomnia: continue melatonin   #Hypertension: continue amlodipine 5 mg    Diet/IVF: general as tolerated  DVT ppx: heparin SQ  Disposition/Admission Status:2-3d  CODE: Full Code        Sena Mckeon Cam    Interval History   Decreased bowel movements   KUB yesterday Nonobstructive bowel gas pattern without pneumatosis or portal venous gas.  WBC uptrending   SCr 1.49 improving    Physical Exam   Temp: 99  F (37.2  C) Temp src: Oral BP: 114/67 Pulse: 95 Heart Rate: 95 Resp: 16 SpO2: 98 % O2 Device: None (Room air)    Vitals:    09/24/19 0000 09/25/19 0100 09/27/19 0432   Weight: 53.3 kg (117 lb 8.1 oz) 54.7 kg (120 lb 9.5 oz) 53.3 kg (117 lb 8.1 oz)     Vital Signs with Ranges  Temp:  [98.5  F (36.9  C)-99.3  F (37.4  C)] 99  F (37.2  C)  Pulse:  [] 95  Heart Rate:  [93-95] 95  Resp:  [16-18] 16  BP: (102-119)/(65-75) 114/67  SpO2:  [98 %-100 %] 98  "%  I/O last 3 completed shifts:  In: 240 [P.O.:240]  Out: -     Heart Rate: 95, Blood pressure 114/67, pulse 95, temperature 99  F (37.2  C), temperature source Oral, resp. rate 16, height 1.727 m (5' 8\"), weight 53.3 kg (117 lb 8.1 oz), SpO2 98 %.  117 lbs 8.08 oz  GEN:  Alert, oriented x 3, appears comfortable, NAD.  CV:  Regular rate and rhythm, tachy no murmur or JVD.  S1 + S2 noted, no S3 or S4.  LUNGS:  Clear to auscultation bilaterally   ABD:  Soft but tender to palpation  EXT:  No edema or cyanosis.      Medications     - MEDICATION INSTRUCTIONS -       - MEDICATION INSTRUCTIONS -         amLODIPine  5 mg Oral Daily     aspirin  81 mg Oral Daily     calcium carbonate 600 mg-vitamin D 400 units  1 tablet Oral BID w/meals     DULoxetine  20 mg Oral Daily     levothyroxine  100 mcg Oral Daily     melatonin  6 mg Oral At Bedtime     mesalamine  800 mg Oral TID     montelukast  10 mg Oral At Bedtime     mycophenolate  1,000 mg Oral BID     omeprazole  40 mg Oral Daily     rosuvastatin  10 mg Oral Daily     sodium chloride (PF)  3 mL Intracatheter Q8H     tacrolimus  3 mg Oral BID IS     valGANciclovir  450 mg Oral BID     vancomycin  125 mg Oral 4x Daily       Data   Recent Labs   Lab 09/27/19  0446 09/26/19  1127 09/26/19  0424 09/25/19  0507  09/23/19  1534   WBC 4.0 4.2  --  3.5*   < > 4.6   HGB 7.7* 9.0*  --  8.0*   < > 10.2*   MCV 81 82  --  82   < > 82    243 247 228   < > 290   INR  --   --   --   --   --  1.17*    136  --  138   < > 134   POTASSIUM 3.9 4.0  --  4.0   < > 4.6   CHLORIDE 111* 110*  --  116*   < > 108   CO2 12* 15*  --  14*   < > 15*   BUN 18 17  --  26   < > 47*   CR 1.49* 1.56*  --  1.61*   < > 2.41*   ANIONGAP 11 11  --  8   < > 11   RADAMES 8.3* 8.6  --  8.5   < > 9.0   GLC 74 87  --  90   < > 96   ALBUMIN 3.0* 3.4  --  3.1*   < > 4.0   PROTTOTAL 5.9* 6.7*  --  5.7*   < > 7.4   BILITOTAL 0.4 0.4  --  0.3   < > 0.4   ALKPHOS 233* 272*  --  237*   < > 301*   ALT 14 17  --  15   < " > 17   AST 10 15  --  11   < > 16   LIPASE  --   --   --   --   --  151   TROPI  --   --   --   --   --  <0.015    < > = values in this interval not displayed.       Recent Results (from the past 24 hour(s))   XR Abdomen Port 1 View    Narrative    XR ABDOMEN PORT 1 VW  9/26/2019 5:05 PM    History:  C diff.     Comparison: Abdominal radiograph on 9/24/2019    Findings:   Supine portable AP abdominal radiograph. None obstructive bowel gas  pattern without pneumatosis or portal venous gas. No acute osseous  abnormality.      Impression    IMPRESSION:  Nonobstructive bowel gas pattern without pneumatosis or portal venous  gas.    I have personally reviewed the examination and initial interpretation  and I agree with the findings.    KISHA GUAMAN MD

## 2019-09-27 NOTE — PROVIDER NOTIFICATION
Paged: Cards 2 [phone]    Pt complaining of CP localized to L chest. VSS, non radiating. EKG STAT conditional order released, does MD want anything else?    MD said to do EKG

## 2019-09-27 NOTE — PROGRESS NOTES
CLINICAL NUTRITION SERVICES     Nutrition Prescription    RECOMMENDATIONS FOR MDs/PROVIDERS TO ORDER:  Recommend starting Thera-pallavi     Consider use of banana flakes for diarrhea management     Malnutrition Status:    Severe malnutrition in the context of acute illness     Recommendations already ordered by Registered Dietitian (RD):  Scheduled one magic cup daily    Future/Additional Recommendations  If PO >1200 kcal and 40 gm protein daily (as indicated by calorie counts 9/25-9/27), recommend placing a feeding tube and starting enteral nutrition if pt agreeable to avoid further weight loss.    Recommend Peptamen 1.5 @ goal 50 ml/hr (1200 ml/day) to provide 1800 kcals (34 kcal/kg/day), 82 g PRO (1.5 g/kg/day), 924 ml free H2O, 67 g Fat (70% from MCTs), 226 g CHO and no Fiber daily.    If GI tract is not functional pending further GI workup, would recommend considering parenteral nutrition.      NEW FINDINGS   Calorie count:   9/25: 1116 calories and 33 g protein   9/26: 921 calories and 13 g protein    Patient reported he was feeling terrible and does not want to talk with a dietitian when he is not eating. He reported all foods make his stomach hurt and nothing has gone well that he is feeling worse every day he is here.   Visit interrupted by GI who came to assess patient     Interventions  Collaboration with other providers    Sabina Pires RD, LD  6B pager: [phone]

## 2019-09-27 NOTE — PROGRESS NOTES
[facility]    Transplant Infectious Diseases Inpatient Progress Note      Everton Larios MRN# [id number]   YOB: 1965 Age: 54 year old   Date of Admission and time: 9/23/2019  3:27 PM            Recommendations:   1. Continue VGCV 450 mg q12 hr. (CrCl 53.3).   - duration of therapy depends on follow up CMV viral load.   2. CMV PCR weekly   - next CMV PCR 10/4/2019.   3. Consider lactic acid.   4. Continue vancomycin PO for total of 14 days (till 10/7/2019).   4. Please give shingrix vaccine and repeat 23-valent pneumovax before discharge.         Summary of Presentation:   Transplants:  2/24/2019 (Heart), Postoperative day:  215     This patient is a 54 year old male with congenital heart disease s/p OHT in 2/2019 with methylprednisolone induction and TAC/MMF for maintenance.   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     Admitted with persistent diarrhea, now with abdominal pain in the setting of a recent diagnosis of CDI and CMV viremia. Also with cough.         Active Problems and Infectious Diseases Issues:   1. CDI.   2. 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   In 7/2019 CMV was 3.5 log. It was repeated 9/17/2019 and resulted at 3.9 log. This replication rate is very slow which is reassuring. The CMV replication rate is not high probably due to CMV +/+ serostatus.   After 6 days of therapy CMV VL is now down to 3 log. VGCV was started 9/20/2019.     PO vancomycin was started 9/23/2019.      The diarrhea and abdominal pain are not improving according to patient but all tests are showing improvement.   GI re-evaluated the patient.      3. FRANKLIN   It's improving.   Likely due to diarrhea.   Will continue to adjust the VGCV dose accordingly.     4. P aeruginosa BSI 4/29/2019 with P aeruginosa HCAP.   5. Abnormal CT chest with pulmonary  nodules that are improving.   The P aeruginosa BSI and HCAP occurred in the setting of febrile neutropenia.   We treated him for severe pseudomonal pneumonia with 4 week course of ABx. On 5/28/2019 he concluded 4 weeks of cefepime from the first negative blood cx on 4/30/2019. Cefepime was used due to QTc prolongation prohibiting the use of fluoroquinolones.   Repeat CT chest 5/22/2019 showed significant improvement of pulmonary nodules. This improvement continues to be evident on CT done this admission on 9/24/2019 while off of ABx and antifungals.       Though the CT scan findings can be explained by P aeruginosa pneumonia, I was concerned about aspergillosis given the pulmonary nodules with GGO, low level of positive BD glucan at 109, and the neutropenia. Hence, I also treated the patient with micafungin starting 5/6/2019 till 6/3/2019 for total of 4 weeks.    Prolonged QTc and high costs prohibited the use of voriconazole/posaconazole and isavuconazole, respectively.          Old Problems and Infectious Diseases Issues:   1. Drug induced neutropenia resolved after stopping bactrim and VGCV.      Other Infectious Disease issues include:  - QTc 532 9/23/2019.   - PCP prophylaxis: off of bactrim since 4/26/2019. Was given pentamidine till 8/2019.   - Serostatus: CMV D+/R+, EBV D+/R+, HSV1+/2-, VZV +               VGCV was discontinued due to neutropenia. CMV PCR was not being checked weekly as recommended by ID in 5/2019. now on VGCV for induction therapy for CMV viremia.   - Immunization status: good candidate for shingrix. Also repeat 23-valent pneumovax sometime after 9/26/2019.   - Gamma globulin status: 358 5/1/2019.       Attestation:  I interviewed the patient and obtained history from the patient, and by reviewing the patient's chart including outside records, microbiological data, and radiological data. All data are summarized in this notes.  Ric Shukla MD   Pager: [phone]  09/27/2019            Interim History:   Still with persistent diarrhea and abdominal pain.   He feels that the abdominal pain is worse. No exacerbating or relieving factors. The pain is in the lower abdomen with no radiation.   Still with N/V and poor appetite.   No other new complaints.          History of Present Illness:   Transplants:  2/24/2019 (Heart), Postoperative day:  215     This patient is a 54 year old male with congenital heart disease s/p OHT in 2/2019 with methylprednisolone induction and TAC/MMF/prednisone for maintenance.      The course was complicated by neutropenia late 4/2019. He was then admitted with fever, neutropenia dyspnea, and cough.   Was found to have multiple pulmonary nodules with GGO on chest CT. Blood cx on 4/29/2019 and BAL on 4/30/2019 grew P aeruginosa.   Treated with cefepime IV for 4 weeks.   Due to multiple risk factors for aspergillosis including neutropenia and positive BD glucan, he was also empirically treated with micafunin IV starting 5/6/2019.   We could not use azoles and fluoroquinolones due to prolonged QTc at baseline. Costs prohibited the use of isavuconazole.      Neutropenia resolved after the 2 doses of G-CSF on 5/1/2019 and 5/2/2019. Also after the discontinuation of VGCV and bactrim and decreasing the dose of MMF.      On 5/24/2019, TTE showed decrease EF. MMF dose was increased though right heart Bx did not show rejection.   Due to neutropenia and inability to use VGCV, ID recommended weekly CMV PCR in the setting of increasing MMF.   In July of 2019 his CMV VL was 3.5 log, it was repeated in 9/2019 and was 3.9 log.   I started him on VGCV adjusted to kidney function. The patient started VGCV on 9/20/2019.  He was also complaining of diarrhea and abdominal pain. I thought the abdominal pain was due to CMV. He was evaluated the same day by GI due to hx of UC. His C diff test was positive.   Before he was able to start vancomycin PO, he needed to be admitted for worsening lower  abdomina pain with few episodes of N/V and persistent diarrhea.  The diarrhea is watery and numerous. The abdominal pain is currently 6/10. No radiation, worse with oral intake and maybe bit better after passing gas and BM. No fever.  The patient is c/o dry cough. He states that everything got worse after receiving the influenza vaccine on 9/20/2019.             Medications:   Medications that Require Transfusion:     - MEDICATION INSTRUCTIONS -       - MEDICATION INSTRUCTIONS -     Scheduled Medications:     amLODIPine  5 mg Oral Daily     aspirin  81 mg Oral Daily     calcium carbonate 600 mg-vitamin D 400 units  1 tablet Oral BID w/meals     DULoxetine  20 mg Oral Daily     levothyroxine  100 mcg Oral Daily     melatonin  6 mg Oral At Bedtime     mesalamine  800 mg Oral TID     montelukast  10 mg Oral At Bedtime     mycophenolate  1,500 mg Oral BID     omeprazole  40 mg Oral Daily     rosuvastatin  10 mg Oral Daily     sodium chloride (PF)  3 mL Intracatheter Q8H     tacrolimus  3 mg Oral BID IS     valGANciclovir  450 mg Oral BID     vancomycin  125 mg Oral 4x Daily             Review of Systems:    As mentioned in the interim history otherwise negative by reviewing constitutional symptoms, central and peripheral neurological systems, respiratory system, cardiac system, GI system,  system, musculoskeletal, skin, allergy, and lymphatics.                  Physical Exam:   Temp: 99  F (37.2  C) Temp src: Oral BP: 114/67 Pulse: 95 Heart Rate: 95 Resp: 16 SpO2: 98 % O2 Device: None (Room air)      Wt Readings from Last 4 Encounters:   09/27/19 53.3 kg (117 lb 8.1 oz)   09/17/19 56.2 kg (123 lb 14.4 oz)   09/17/19 56.3 kg (124 lb 3.2 oz)   08/13/19 60.1 kg (132 lb 9.6 oz)   Constitutional: awake, alert, cooperative, no apparent distress and appears at stated age, well nourished.   Abdomen: tense but not rigid, generalized tenderness, tympanic to percussion, hypoactive BS.            Data:   No results found for:  ACD4    Inflammatory Markers    Recent Labs   Lab Test 09/24/19  0435 09/23/19  1534 09/17/19  1702 05/15/19  0940 05/02/19  0536 04/29/19  0911 11/15/18  0955 10/22/18  1239   SED  --  17 18  --  37*  --   --   --    CRP <2.9 3.8 <2.9 <2.9 69.0* 120.0* 6.1 12.5       Immune Globulin Studies     Recent Labs   Lab Test 09/17/19  1702 05/01/19  1850   IGG  --  358*   IGM  --  29*   IGE  --  38    106       Metabolic Studies       Recent Labs   Lab Test 09/27/19  0446 09/26/19  1127 09/25/19  0507 09/24/19  1947 09/24/19  0435 09/24/19  0035 09/23/19  1534  07/17/19  1030  06/17/19  0926  04/29/19  0918  02/23/19  1835    136 138 137 138 137 134   < > 135   < > 137   < >  --    < > 133   POTASSIUM 3.9 4.0 4.0 3.9 3.9 4.1 4.6   < > 4.2   < > 4.1   < >  --    < > 3.6   CHLORIDE 111* 110* 116* 114* 116* 112* 108   < > 103   < > 104   < >  --    < > 93*   CO2 12* 15* 14* 15* 12* 12* 15*   < > 24   < > 20   < >  --    < > 30   ANIONGAP 11 11 8 8 11 13 11   < > 8   < > 13   < >  --    < > 10   BUN 18 17 26 30 40* 41* 47*   < > 17   < > 27   < >  --    < > 28   CR 1.49* 1.56* 1.61* 1.66* 1.78* 1.88* 2.41*   < > 1.07   < > 1.39*   < >  --    < > 1.03   GFRESTIMATED 52* 49* 47* 45* 42* 39* 29*   < > 77   < > 56*   < >  --    < > 81   GLC 74 87 90 116* 84 109* 96   < > 80   < > 80   < >  --    < > 114*   A1C  --   --   --   --   --   --   --   --   --   --   --   --   --   --  5.7*   RADAMES 8.3* 8.6 8.5 8.1* 7.6* 7.9* 9.0   < > 8.9   < > 8.8   < >  --    < > 8.9   PHOS  --   --   --   --   --   --  4.8*  --  4.0  --  3.8   < >  --    < > 3.6   MAG  --   --   --   --   --   --  2.0  --  1.7  --  1.4*   < >  --    < > 2.0   LACT  --   --   --   --   --   --  1.2  --   --   --   --   --  1.7   < >  --    CKT  --   --   --   --   --   --   --   --  34  --  46   < >  --   --   --     < > = values in this interval not displayed.       Hepatic Studies    Recent Labs   Lab Test 09/27/19  0446 09/26/19  1127 09/25/19  0506  09/24/19  0435 09/23/19  1534 09/17/19  1702 09/17/19  0856  03/14/19  1653  03/10/19  0410   BILITOTAL 0.4 0.4 0.3 0.4 0.4  --  0.3   < >  --    < > 0.8   ALKPHOS 233* 272* 237* 218* 301*  --  285*   < >  --    < > 104   ALBUMIN 3.0* 3.4 3.1* 2.9* 4.0  --  3.6   < >  --    < > 2.8*   AST 10 15 11 11 16  --  16   < >  --    < > 22   ALT 14 17 15 14 17  --  23   < >  --    < > 19   LDH  --   --   --   --   --   --   --   --  320*  --  276*   GGT  --   --   --   --   --  130*  --   --   --   --   --     < > = values in this interval not displayed.       Pancreatitis testing    Recent Labs   Lab Test 09/23/19  1534 09/17/19  1702 05/22/19  0926 04/29/19  0911 03/18/19  0505 02/23/19  1835 11/15/18  0955   AMYLASE  --   --   --   --  51 22*  --    LIPASE 151  --   --  42* 237  --   --    TRIG  --  166* 175*  --   --   --  112       Hematology Studies      Recent Labs   Lab Test 09/27/19  0446 09/26/19  1127 09/26/19  0424 09/25/19  0507 09/24/19  0437  09/23/19  1534 09/17/19  1702 09/17/19  0856  07/24/19  1013   WBC 4.0 4.2  --  3.5* 3.6*  --  4.6 3.7* 3.5*   < > 4.9   ANEU 2.9  --   --   --  1.9  --  2.9 2.0 1.9  --  2.6   ALYM 0.9  --   --   --  0.8  --  1.0 1.1 0.9  --  1.1   GRACE 0.1  --   --   --  0.4  --  0.4 0.4 0.5  --  0.7   AEOS 0.1  --   --   --  0.3  --  0.3 0.3 0.3  --  0.3   HGB 7.7* 9.0*  --  8.0* 7.6*  --  10.2* 10.1* 9.5*   < > 9.3*   HCT 24.2* 28.8*  --  25.7* 24.2*  --  32.7* 32.8* 28.5*   < > 29.9*    243 247 228 234   < > 290 284 279   < > 251    < > = values in this interval not displayed.       Clotting Studies    Recent Labs   Lab Test 09/23/19  1534 05/08/19  0504 05/07/19  0451 05/06/19  0438  03/14/19  1653  02/27/19  0421 02/25/19  1502   INR 1.17* 1.22* 1.33* 1.31*   < > 1.36*   < >  --  1.29*   PTT 35  --   --   --   --  46*  --  38* 32    < > = values in this interval not displayed.       Arterial Blood Gas Testing    Recent Labs   Lab Test 03/11/19  1152 03/10/19  1214  03/09/19  0400 03/08/19  2147  02/25/19  1717 02/25/19  1502 02/25/19  0918  02/25/19  0350 02/25/19  0110   PH  --   --   --   --   --  7.32* 7.31* 7.33*  --  7.39 7.35   PCO2  --   --   --   --   --  36 51* 46*  --  42 42   PO2  --   --   --   --   --  110* 75* 125*  --  87 170*   HCO3  --   --   --   --   --  19* 26 24  --  25 23   O2PER 21 21 21 21.0   < > 3L 2  2 40.0  40.0   < > 40.0  40.0 60.0    < > = values in this interval not displayed.        Urine Studies     Recent Labs   Lab Test 05/26/19  1006 04/29/19  1031 03/26/19  1707 03/17/19  0045 03/04/19  1303   URINEPH 6.5 6.0 5.5 5.5 5.0   NITRITE Negative Negative Negative Negative Negative   LEUKEST Negative Negative Negative Negative Negative   WBCU 0 <1 2 15* 0       Vancomycin Levels     Recent Labs   Lab Test 05/02/19  1519   VANCOMYCIN 10.0       Tacrolimus levels    Invalid input(s): TACROLIMUS, TAC, TACR  Transplant Immunosuppression Labs Latest Ref Rng & Units 9/27/2019 9/26/2019 9/25/2019 9/24/2019 9/24/2019   Tacro Level 5.0 - 15.0 ug/L 6.1 3.1(L) - - -   Tacro Level - Not Provided Not Provided - - -   Creat 0.66 - 1.25 mg/dL 1.49(H) 1.56(H) 1.61(H) 1.66(H) -   BUN 7 - 30 mg/dL 18 17 26 30 -   WBC 4.0 - 11.0 10e9/L 4.0 4.2 3.5(L) - 3.6(L)   Neutrophil % 72.2 - - - 53.8   ANEU 1.6 - 8.3 10e9/L 2.9 - - - 1.9       Microbiology:  Blood cx negative to date.   Last check of C difficile  C Diff Toxin B PCR   Date Value Ref Range Status   09/20/2019 Positive (A) NEG^Negative Final     Comment:     Positive: Toxin producing C. difficile target DNA sequences detected, presumed   positive for C. difficile toxin B. C. difficile (Requires Enteric Isolation).      Clostridium difficile (Requires Enteric Isolation)  FDA approved assay performed using [employer] GeneXpert real-time PCR.         Virology:  CMV viral loads  No results found for: 59133, 10957, 41458, 68316  CMV viral loads    Recent Labs   Lab Test 09/26/19  0424 09/17/19  0856   CSPEC EDTA  PLASMA Plasma   CMVLOG 3.0* 3.9*       CMV viral loads    Log IU/mL of CMVQNT   Date Value Ref Range Status   09/26/2019 3.0 (H) <2.1 [Log_IU]/mL Final   09/17/2019 3.9 (H) <2.1 [Log_IU]/mL Final   07/24/2019 3.5 (H) <2.1 [Log_IU]/mL Final   06/26/2019 <2.1 <2.1 [Log_IU]/mL Final   06/04/2019 Not Calculated <2.1 [Log_IU]/mL Final   05/24/2019 Not Calculated <2.1 [Log_IU]/mL Final   05/22/2019 Not Calculated <2.1 [Log_IU]/mL Final   05/15/2019 Not Calculated <2.1 [Log_IU]/mL Final   05/06/2019 Not Calculated <2.1 [Log_IU]/mL Final   04/30/2019 Not Calculated <2.1 [Log_IU]/mL Final   04/29/2019 Not Calculated <2.1 [Log_IU]/mL Final   04/26/2019 Not Calculated <2.1 [Log_IU]/mL Final   04/24/2019 Not Calculated <2.1 [Log_IU]/mL Final   04/17/2019 Not Calculated <2.1 [Log_IU]/mL Final   04/08/2019 Not Calculated <2.1 [Log_IU]/mL Final   04/01/2019 Not Calculated <2.1 [Log_IU]/mL Final   03/25/2019 Not Calculated <2.1 [Log_IU]/mL Final   03/18/2019 Not Calculated <2.1 [Log_IU]/mL Final       CMV resistance testing  No lab results found.  No results found for: CMVCID, CMVFOS, CMVGAN     No results found for: H6RES    EBV DNA Copies/mL   Date Value Ref Range Status   09/17/2019 EBV DNA Not Detected EBVNEG^EBV DNA Not Detected [Copies]/mL Final   05/22/2019 541 (A) EBVNEG^EBV DNA Not Detected [Copies]/mL Final   04/29/2019 EBV DNA Not Detected EBVNEG^EBV DNA Not Detected [Copies]/mL Final   04/26/2019 EBV DNA Not Detected EBVNEG^EBV DNA Not Detected [Copies]/mL Final   03/27/2019 EBV DNA Not Detected EBVNEG^EBV DNA Not Detected [Copies]/mL Final       CMV Antibody IgG   Date Value Ref Range Status   02/23/2019 >8.0 (H) 0.0 - 0.8 AI Final     Comment:     Positive  Antibody index (AI) values reflect qualitative changes in antibody   concentration that cannot be directly associated with clinical condition or   disease state.     02/15/2019 >8.0 (H) 0.0 - 0.8 AI Final     Comment:     Positive  Antibody index (AI) values  reflect qualitative changes in antibody   concentration that cannot be directly associated with clinical condition or   disease state.         Toxoplasma Antibody IgG   Date Value Ref Range Status   02/22/2019 <3.0 0.0 - 7.1 IU/mL Final     Comment:     Negative- Absence of detectable Toxoplasma gondii IgG antibodies. A negative   result does not rule out acute infection.  The magnitude of the measured result is not indicative of the amount of   antibody present. The concentrations of anti-Toxoplasma gondii IgG in a given   specimen determined with assays from different manufacturers can vary due to   differences in assay methods and reagent specificity.         Imaging:  Abdominal X ray 9/26/2019   IMPRESSION:  Nonobstructive bowel gas pattern without pneumatosis or portal venous  gas.    CT chest/abdomen/pelvis 9/23/2019   IMPRESSION:  1.  Continued decrease in size of left upper lung lesion with residual  nodularity, previously measured 10 x 8 mm, suggestive of  infectious/inflammatory process. No new pulmonary lesions.  2.  Limited evaluation of abdomen and pelvis due to lack of IV  contrast. Nonspecific numerous subcentimeter mesenteric and  retroperitoneal lymph nodes, likely reactive.  3.  Resolution of ascites.  4.  Blood pool density lower than myocardium, compatible with  patient's known anemia.        Ric Shukla MD  Pager: [phone]  09/27/2019

## 2019-09-27 NOTE — PLAN OF CARE
Neuro: A&Ox4.   Cardiac: SR. VSS.       Respiratory: Sating 98% on RA.  GI/: Adequate urine output. BM x 5, loose. Now order to observe and document each stool  Diet/appetite: Tolerating diet. Poor appetite. Calorie  counts  Activity:  Up independently. Encouraged to walk in halls   Pain: Lower abd to LUQ tenderness and cramping   Skin: No new deficits noted.  LDA's: L PIV SL      Plan: Continue with POC. Notify primary team with changes.

## 2019-09-28 ENCOUNTER — RECORDS - HEALTHEAST (OUTPATIENT)
Dept: ADMINISTRATIVE | Facility: OTHER | Age: 54
End: 2019-09-28

## 2019-09-28 VITALS
DIASTOLIC BLOOD PRESSURE: 74 MMHG | HEART RATE: 95 BPM | WEIGHT: 115.96 LBS | RESPIRATION RATE: 16 BRPM | HEIGHT: 68 IN | BODY MASS INDEX: 17.57 KG/M2 | OXYGEN SATURATION: 100 % | SYSTOLIC BLOOD PRESSURE: 120 MMHG | TEMPERATURE: 98.4 F

## 2019-09-28 PROBLEM — Z51.81 ENCOUNTER FOR THERAPEUTIC DRUG MONITORING: Status: ACTIVE | Noted: 2019-09-28

## 2019-09-28 LAB
ALBUMIN SERPL-MCNC: 3.2 G/DL (ref 3.4–5)
ALP SERPL-CCNC: 221 U/L (ref 40–150)
ALT SERPL W P-5'-P-CCNC: 14 U/L (ref 0–70)
ANION GAP SERPL CALCULATED.3IONS-SCNC: 12 MMOL/L (ref 3–14)
ANISOCYTOSIS BLD QL SMEAR: SLIGHT
AST SERPL W P-5'-P-CCNC: 14 U/L (ref 0–45)
BASOPHILS # BLD AUTO: 0 10E9/L (ref 0–0.2)
BASOPHILS NFR BLD AUTO: 0 %
BILIRUB SERPL-MCNC: 0.5 MG/DL (ref 0.2–1.3)
BUN SERPL-MCNC: 19 MG/DL (ref 7–30)
BURR CELLS BLD QL SMEAR: SLIGHT
CALCIUM SERPL-MCNC: 8.7 MG/DL (ref 8.5–10.1)
CHLORIDE SERPL-SCNC: 111 MMOL/L (ref 94–109)
CO2 SERPL-SCNC: 14 MMOL/L (ref 20–32)
CREAT SERPL-MCNC: 1.51 MG/DL (ref 0.66–1.25)
DIFFERENTIAL METHOD BLD: ABNORMAL
EOSINOPHIL # BLD AUTO: 0.2 10E9/L (ref 0–0.7)
EOSINOPHIL NFR BLD AUTO: 5.3 %
ERYTHROCYTE [DISTWIDTH] IN BLOOD BY AUTOMATED COUNT: 14.3 % (ref 10–15)
GFR SERPL CREATININE-BSD FRML MDRD: 51 ML/MIN/{1.73_M2}
GLUCOSE SERPL-MCNC: 75 MG/DL (ref 70–99)
HCT VFR BLD AUTO: 24.8 % (ref 40–53)
HGB BLD-MCNC: 8 G/DL (ref 13.3–17.7)
LYMPHOCYTES # BLD AUTO: 0.5 10E9/L (ref 0.8–5.3)
LYMPHOCYTES NFR BLD AUTO: 15 %
MCH RBC QN AUTO: 26.1 PG (ref 26.5–33)
MCHC RBC AUTO-ENTMCNC: 32.3 G/DL (ref 31.5–36.5)
MCV RBC AUTO: 81 FL (ref 78–100)
MICROCYTES BLD QL SMEAR: PRESENT
MONOCYTES # BLD AUTO: 0.1 10E9/L (ref 0–1.3)
MONOCYTES NFR BLD AUTO: 3.5 %
NEUTROPHILS # BLD AUTO: 2.7 10E9/L (ref 1.6–8.3)
NEUTROPHILS NFR BLD AUTO: 76.2 %
PLATELET # BLD AUTO: 247 10E9/L (ref 150–450)
PLATELET # BLD EST: ABNORMAL 10*3/UL
POIKILOCYTOSIS BLD QL SMEAR: SLIGHT
POTASSIUM SERPL-SCNC: 3.7 MMOL/L (ref 3.4–5.3)
PROT SERPL-MCNC: 6 G/DL (ref 6.8–8.8)
RBC # BLD AUTO: 3.07 10E12/L (ref 4.4–5.9)
SODIUM SERPL-SCNC: 137 MMOL/L (ref 133–144)
TACROLIMUS BLD-MCNC: 7.3 UG/L (ref 5–15)
TME LAST DOSE: NORMAL H
WBC # BLD AUTO: 3.5 10E9/L (ref 4–11)

## 2019-09-28 PROCEDURE — 25000128 H RX IP 250 OP 636: Performed by: STUDENT IN AN ORGANIZED HEALTH CARE EDUCATION/TRAINING PROGRAM

## 2019-09-28 PROCEDURE — 25000132 ZZH RX MED GY IP 250 OP 250 PS 637: Performed by: STUDENT IN AN ORGANIZED HEALTH CARE EDUCATION/TRAINING PROGRAM

## 2019-09-28 PROCEDURE — 80197 ASSAY OF TACROLIMUS: CPT | Performed by: STUDENT IN AN ORGANIZED HEALTH CARE EDUCATION/TRAINING PROGRAM

## 2019-09-28 PROCEDURE — 80053 COMPREHEN METABOLIC PANEL: CPT | Performed by: STUDENT IN AN ORGANIZED HEALTH CARE EDUCATION/TRAINING PROGRAM

## 2019-09-28 PROCEDURE — 99238 HOSP IP/OBS DSCHRG MGMT 30/<: CPT | Mod: GC | Performed by: INTERNAL MEDICINE

## 2019-09-28 PROCEDURE — 36415 COLL VENOUS BLD VENIPUNCTURE: CPT | Performed by: STUDENT IN AN ORGANIZED HEALTH CARE EDUCATION/TRAINING PROGRAM

## 2019-09-28 PROCEDURE — 85025 COMPLETE CBC W/AUTO DIFF WBC: CPT | Performed by: STUDENT IN AN ORGANIZED HEALTH CARE EDUCATION/TRAINING PROGRAM

## 2019-09-28 PROCEDURE — 25000132 ZZH RX MED GY IP 250 OP 250 PS 637: Performed by: NURSE PRACTITIONER

## 2019-09-28 PROCEDURE — 25000131 ZZH RX MED GY IP 250 OP 636 PS 637: Performed by: STUDENT IN AN ORGANIZED HEALTH CARE EDUCATION/TRAINING PROGRAM

## 2019-09-28 PROCEDURE — 90732 PPSV23 VACC 2 YRS+ SUBQ/IM: CPT | Performed by: STUDENT IN AN ORGANIZED HEALTH CARE EDUCATION/TRAINING PROGRAM

## 2019-09-28 RX ORDER — TACROLIMUS 1 MG/1
3 CAPSULE ORAL
COMMUNITY
Start: 2019-09-28 | End: 2019-09-28

## 2019-09-28 RX ORDER — TACROLIMUS 1 MG/1
3 CAPSULE ORAL 2 TIMES DAILY
Qty: 30 CAPSULE | Refills: 3 | Status: SHIPPED | OUTPATIENT
Start: 2019-09-28 | End: 2019-10-07

## 2019-09-28 RX ORDER — VALGANCICLOVIR 450 MG/1
450 TABLET, FILM COATED ORAL 2 TIMES DAILY
Qty: 60 TABLET | Refills: 2 | Status: SHIPPED | OUTPATIENT
Start: 2019-09-28 | End: 2019-10-07

## 2019-09-28 RX ORDER — VANCOMYCIN HYDROCHLORIDE 50 MG/ML
125 KIT ORAL 4 TIMES DAILY
Qty: 90 ML | Refills: 0 | Status: SHIPPED | OUTPATIENT
Start: 2019-09-28 | End: 2019-10-22

## 2019-09-28 RX ORDER — VALGANCICLOVIR 450 MG/1
450 TABLET, FILM COATED ORAL 2 TIMES DAILY
Qty: 60 TABLET | Refills: 2 | Status: SHIPPED | OUTPATIENT
Start: 2019-09-28 | End: 2019-09-28

## 2019-09-28 RX ADMIN — AMLODIPINE BESYLATE 5 MG: 5 TABLET ORAL at 08:58

## 2019-09-28 RX ADMIN — Medication 1 TABLET: at 08:58

## 2019-09-28 RX ADMIN — VANCOMYCIN HYDROCHLORIDE 125 MG: KIT at 08:59

## 2019-09-28 RX ADMIN — VALGANCICLOVIR 450 MG: 450 TABLET, FILM COATED ORAL at 08:58

## 2019-09-28 RX ADMIN — ASPIRIN 81 MG CHEWABLE TABLET 81 MG: 81 TABLET CHEWABLE at 08:58

## 2019-09-28 RX ADMIN — TACROLIMUS 3 MG: 1 CAPSULE ORAL at 08:58

## 2019-09-28 RX ADMIN — LEVOTHYROXINE SODIUM 100 MCG: 100 TABLET ORAL at 08:58

## 2019-09-28 RX ADMIN — DULOXETINE HYDROCHLORIDE 20 MG: 20 CAPSULE, DELAYED RELEASE ORAL at 08:58

## 2019-09-28 RX ADMIN — MESALAMINE 800 MG: 800 TABLET, DELAYED RELEASE ORAL at 08:58

## 2019-09-28 RX ADMIN — ROSUVASTATIN CALCIUM 10 MG: 10 TABLET, FILM COATED ORAL at 08:58

## 2019-09-28 RX ADMIN — PNEUMOCOCCAL VACCINE POLYVALENT 0.5 ML
25; 25; 25; 25; 25; 25; 25; 25; 25; 25; 25; 25; 25; 25; 25; 25; 25; 25; 25; 25; 25; 25; 25 INJECTION, SOLUTION INTRAMUSCULAR; SUBCUTANEOUS at 12:15

## 2019-09-28 RX ADMIN — OMEPRAZOLE 40 MG: 20 CAPSULE, DELAYED RELEASE ORAL at 08:58

## 2019-09-28 RX ADMIN — MYCOPHENOLATE MOFETIL 1500 MG: 250 CAPSULE ORAL at 08:58

## 2019-09-28 RX ADMIN — VANCOMYCIN HYDROCHLORIDE 125 MG: KIT at 11:54

## 2019-09-28 ASSESSMENT — ACTIVITIES OF DAILY LIVING (ADL)
ADLS_ACUITY_SCORE: 11

## 2019-09-28 ASSESSMENT — MIFFLIN-ST. JEOR: SCORE: 1330.5

## 2019-09-28 NOTE — PLAN OF CARE
Neuro: A&Ox4.   Cardiac: SR. BP stable. Refused vital signs and physical assessment after 8PM.   Respiratory: Sating upper 90s on RA.  GI/: Independent with toileting. No BM.  Diet/appetite: Tolerating regular diet with poor appetite.  Activity:  Independent, up to chair and in bathroom.  Pain: At acceptable level on current regimen.   Skin: No new deficits noted.  LDA's: L PIV SL.    Plan: Continue with POC. Notify primary team with changes.

## 2019-09-28 NOTE — PLAN OF CARE
Time: 1500 - 1930  Reason for admission: Pt admitted 9/23 with c.diff  VS: VSS on RA. Complaining of abd pain.   Activity:  Independent in room.  Neuros: Alert and oriented x 4.   Cardiac: Complained of some L sided CP this shift (stated it has been off and on) - EKG completed. SR in 's.   Respiratory: Dyspnea on exertion. LS diminished in bases.   GI/: +gas/hyperactive BS. 2 BM this shift - per GI to make sure to document these BMs well. Complaining of abd pain, no N/V. Adequate UO.   Diet: Regular diet, poor appetite. Calorie counts.   Skin: WDL.   Lines: PIV to LFA SL.   New changes this shift: No new changes this shift. Sent stool sample this shift. Ate 1/2 of his meal.   Will continue to monitor & follow POC.

## 2019-09-28 NOTE — PROGRESS NOTES
Calorie Count  Intake recorded for: 9/27  Total Kcals: 435 Total Protein: 16g  Kcals from Hospital Food: 435  Protein: 16g  Kcals from Outside Food (average):0 Protein: 0g  # Meals Recorded: 100% 2 puddings, 50% egg noodles w/ meat sauce, parmesan, and mozzarella  # Supplements Recorded: 0

## 2019-09-28 NOTE — DISCHARGE SUMMARY
[facility]   Cardiology II Service / Advanced Heart Failure  Discharge Summary     Everton Larios MRN# [id number]   YOB: 1965 Age: 54 year old     DATE OF ADMISSION: 9/23/2019  DATE OF DISCHARGE: 9/28/2019  ADMITTING PROVIDER: Jose Alfredo Young MD  DISCHARGE PROVIDER: Jose Alfredo Young  PRIMARY PROVIDER: Fredrick Wolff      DISCHARGE DIAGNOSES:   Clostridium Difficile Colitis  CMV Viremia  Acute renal injury  Ulcerative colitis  Left breast pain  Nonischemic cardiomyopathy s/p OHT (2/24/2019)      ITEMS FOR OUTPATIENT FOLLOW UP:   Follow up with ID in 2 weeks (Dr. Shukla)  Follow up with GI in 2 weeks (Dr. Allen)  Follow up with Dr. Donis in cardiology as scheduled   CMV PCR, CBC, BMP      HPI:   Please see the detailed H & P from 9/23/2019. Briefly, Everton Larios is a 54 year old male with a history of NICM s/p OHT (2/24/2019), paroxysmal atrial fibrillation, recent T12 compression fracture (4/21/2019), ulcerative colitis, Pierce's esophagus, hypothyroidism, GERD, BPH and depression who is admitted for further evaluation and management acute kidney injury in the setting of worsening CMV viremia and newly diagnosed clostridium difficile.     HOSPITAL COURSE BY PROBLEM:   Clostridium Difficile Colitis  CMV Viremia  Ulcerative colitis  Admitted with 10 lb weight loss, abdominal pain, and diarrhea in setting of C Diff infection and CMV viremia. GI and Transplant ID services consulted with recommendations for continuation of vancomycin PO for 10 day course. Valgancyclovir dose adjusted. Pneumovax administered prior to discharge.  - Continue home mesalamine 800 mg TID  - Continue vancomycin PO for completion of 10 day course  - Increase valacyclovir from 125 mg daily to 450 mg q12hrs  - Follow up with ID in 2 weeks with labs (CBC, BMP, CMV PCR in 2 weeks)  - Follow up with GI in 2 weeks  - Follow up with PCP for routine post-hospitalization follow-up and shingrix vaccine    Acute renal  "injury  - Follow up with PCP with BMP recommended in 2 weeks     Left breast pain / swelling under left nipple  CT without evidence of abscess or malignant pathology (discussed with staff radiologist). US with gynecomastia.   - Follow up with PCP     Nonischemic cardiomyopathy s/p OHT (2/24/2019)  - Most recent biopsy 8/13/19 1R (stable)  - Serostatus: CMV+, EBV+, HSV1+, HSV2 neg, VZV+  - Immunosuppresives:               - Decrease PTA Cellcept from 1,500 mg BID to 1000 BID (May consider decreasing dose if WBC <3.5)              - Tacro 3 mg BID (goal 6-8)   - Follow up with transplant ID  - PPx: none, therapeutic valacyclovir as above  - Diuretics: none  - ASA 81mg daily  - Rosuvastatin 10mg daily     Chronic Medical Conditions   # P aeruginosa BSI 4/29/19 with P aeruginosa HCAP with abnormal improving nodules per CT Chest  - s/p katerin 5/6/19-6/3/19 (4 weeks) in case this was aspergillosis   - now improving on CT per ID could all be sequelae of his P aeruginosa.   #Hypothyroidism: continue levothyroxine, last TSH was low-recheck in AM  #GERD and history of Pierce's esophagus: continue omeprazole   #Depression: continue duloxetine   #Seasonal allergies: fluticasone, montelukast   #Insomnia: continue melatonin   #Hypertension: continue amlodipine 5 mg        PHYSICAL EXAM:  Blood pressure 120/74, pulse 95, temperature 98.4  F (36.9  C), temperature source Oral, resp. rate 16, height 1.727 m (5' 8\"), weight 52.6 kg (115 lb 15.4 oz), SpO2 100 %.  GENERAL: Appears alert and oriented times three. Cachectic.  HEENT: Eye symmetrical and EOMI. Mucous membranes moist and without lesions.  NECK: Supple and without lymphadenopathy.   CV: S1/S2 heard without murmur  RESPIRATORY: Respirations regular, even, and unlabored. Normal breath sounds.   GI: Soft and non distended with normoactive bowel sounds present in all quadrants. No tenderness, rebound, guarding. No organomegaly.   EXTREMITIES: No peripheral lower extremity edema. " 2+ bilateral pedal pulses.   NEUROLOGIC: Alert and orientated x 3. CN II-XII grossly intact. No focal deficits.   MUSCULOSKELETAL: No joint swelling or tenderness.   SKIN: No jaundice. No rashes or lesions.     LABS:   Last CBC:   Recent Labs   Lab Test 09/27/19  0446   WBC 4.0   RBC 2.98*   HGB 7.7*   HCT 24.2*   MCV 81   MCH 25.8*   MCHC 31.8   RDW 14.2          Last CMP:  Recent Labs   Lab Test 09/27/19  0446      POTASSIUM 3.9   CHLORIDE 111*   RADAMES 8.3*   CO2 12*   BUN 18   CR 1.49*   GLC 74   AST 10   ALT 14   BILITOTAL 0.4   ALBUMIN 3.0*   PROTTOTAL 5.9*   ALKPHOS 233*       IMAGING:  Results for orders placed or performed during the hospital encounter of 09/23/19   CT Chest Abdomen Pelvis w/o Contrast    Narrative    EXAMINATION: CT CHEST ABDOMEN PELVIS W/O CONTRAST  9/23/2019 6:27 PM      CLINICAL HISTORY: increase ab pain with n/v/d hx of c diff and cmv  infection. patient with FRANKLIN also-- no contrast.    COMPARISON: Chest CT on 5/22/2019, CT cap on 4/29/2019        PROCEDURE COMMENTS: CT of the chest, abdomen, and pelvis was performed  without intravenous and oral contrast. Axial MIP  images of the chest,  and coronal and sagittal reformatted images of the chest, abdomen, and  pelvis obtained.    FINDINGS:      Chest:  The trachea and central airways are clear.  There is no mass or  infiltration. Residual left upper lobe 4 mm nodularity (image 52  series 4), previously measured 10 x 8 mm. No significant pulmonary  nodules. No pleural effusion or pneumothorax.    Unchanged asymmetric enlarged left thyroid lobe. Postoperative changes  of heart transplant. Stable positioning of left brachiocephalic vein  stent. There is no pericardial effusion.  Normal thoracic vasculature.  No significant mediastinal, hilum or axillary lymphadenopathy.     Abdomen/pelvis:   Limited evaluation due to lack of IV contrast.    Liver: No focal mass. Unchanged multiple small hypoattenuating  lesions, too small to  characterize.    Gallbladder: No radiopaque gallstones. No pericystic fluid.    Pancreas: No pancreatic mass or pancreatic duct dilatation.    Spleen: Not enlarged.    Adrenal glands: Within normal limits.    Urinary tract: Kidneys are normal in appearance. There is no evidence  for hydronephrosis or hydroureter. Urinary bladder is unremarkable.    Reproductive organs: Within normal limits.    GI system: No abnormally dilated small bowel or colon. No definite  bowel wall thickening.    Peritoneum/fluid: No ascites     Vessels: No aneurysmal dilatation of the abdominal aorta.  The portal,  splenic, and superior mesenteric veins are patent.  The origins of the  celiac and superior mesenteric arteries are patent.    Lymph nodes: No enlarged lymphadenopathy. Numerous subcentimeter  mesenteric, retroperitoneal lymph nodes.    Bones and the soft tissues: No aggressive osseous lesions. Stable T12  compression deformity with approximately 30% vertebral body height  loss..      Impression    IMPRESSION:  1.  Continued decrease in size of left upper lung lesion with residual  nodularity, previously measured 10 x 8 mm, suggestive of  infectious/inflammatory process. No new pulmonary lesions.  2.  Limited evaluation of abdomen and pelvis due to lack of IV  contrast. Nonspecific numerous subcentimeter mesenteric and  retroperitoneal lymph nodes, likely reactive.  3.  Resolution of ascites.  4.  Blood pool density lower than myocardium, compatible with  patient's known anemia.    I have personally reviewed the examination and initial interpretation  and I agree with the findings.    LAUREN RICHARD MD   US Breast Left Limited 1-3 Quadrants    Narrative    Examination: LEFT BREAST ULTRASOUND, 9/24/2019 8:52 AM     Comparison: CT chest abdomen and pelvis 9/23/2019.    History/Family History: Left breast lump located circumferentially  around the nipple noted by patient approximately one month ago.  Swelling under left breast nipple.  Evaluate for left breast abscess.    Findings: In the retroareolar left breast there is moderate  gynecomastia, as seen on CT one day prior. No fluid collection  concerning for abscess.      Impression    IMPRESSION: BI-RADS CATEGORY: 2 - Benign. Gynecomastia.    RECOMMENDED FOLLOW-UP: Clinical follow-up.      I have personally reviewed the examination and initial interpretation  and I agree with the findings.    MADISON PEREZ MD   XR Abdomen Port 1 View    Narrative    XR ABDOMEN PORT 1 VW  9/24/2019 5:39 PM    History:  c diff.     Comparison: CT cap on 9/23/2019    Findings:   Portable supine AP abdominal radiograph. Nonobstructive bowel gas  pattern. No pneumatosis, portal venous gas. No acute osseous  abnormality. The visualized lung fields are clear.      Impression    IMPRESSION:  None obstructive bowel gas pattern without pneumatosis or portal  venous gas.    I have personally reviewed the examination and initial interpretation  and I agree with the findings.    MADISON PEREZ MD   XR Abdomen Port 1 View    Narrative    XR ABDOMEN PORT 1 VW  9/26/2019 5:05 PM    History:  C diff.     Comparison: Abdominal radiograph on 9/24/2019    Findings:   Supine portable AP abdominal radiograph. None obstructive bowel gas  pattern without pneumatosis or portal venous gas. No acute osseous  abnormality.      Impression    IMPRESSION:  Nonobstructive bowel gas pattern without pneumatosis or portal venous  gas.    I have personally reviewed the examination and initial interpretation  and I agree with the findings.    KISHA GUAMAN MD       PROCEDURES:   None    CONSULTATIONS:   GI  ID    PENDING RESULTS:   Fecal calprotectin    DISCHARGE MEDICATIONS:  Discharge Medication List as of 9/28/2019 11:38 AM      START taking these medications    Details   vancomycin (FIRVANQ) 50 MG/ML oral solution Take 2.5 mLs (125 mg) by mouth 4 times daily for 9 days, Disp-90 mL, R-0, Local Print         CONTINUE these  medications which have CHANGED    Details   tacrolimus (GENERIC EQUIVALENT) 1 MG capsule Take 3 capsules (3 mg) by mouth 2 times daily, Disp-30 capsule, R-3, E-Prescribe      valGANciclovir (VALCYTE) 450 MG tablet Take 1 tablet (450 mg) by mouth 2 times daily, Disp-60 tablet, R-2, E-Prescribe         CONTINUE these medications which have NOT CHANGED    Details   acetaminophen (TYLENOL) 325 MG tablet Take 2 tablets (650 mg) by mouth every 4 hours as needed for mild pain, Transitional      albuterol (PROAIR HFA/PROVENTIL HFA/VENTOLIN HFA) 108 (90 Base) MCG/ACT inhaler Inhale 2 puffs into the lungs every 6 hours as needed for shortness of breath / dyspnea or wheezing, Historical      aspirin (ASA) 81 MG chewable tablet Take 1 tablet (81 mg) by mouth daily, Transitional      calcium carbonate 600 mg-vitamin D 400 units (CALTRATE) 600-400 MG-UNIT per tablet Take 1 tablet by mouth 2 times daily (with meals), Transitional      DULoxetine (CYMBALTA) 20 MG EC capsule Take 20 mg by mouth daily, Historical      fluticasone (FLOVENT HFA) 220 MCG/ACT Inhaler Inhale 2 puffs into the lungs 2 times daily, Historical      levothyroxine (SYNTHROID/LEVOTHROID) 100 MCG tablet Take 100 mcg by mouth daily , Historical      melatonin 3 MG tablet Take 2 tablets (6 mg) by mouth At Bedtime, Transitional      montelukast (SINGULAIR) 10 MG tablet Take 1 tablet (10 mg) by mouth At Bedtime, Historical      amLODIPine (NORVASC) 5 MG tablet TAKE 1 TABLET BY MOUTH EVERY DAY, Disp-30 tablet, R-2, E-Prescribe      cetirizine (ZYRTEC) 10 MG tablet Take 10 mg by mouth daily, Historical      fluticasone (FLONASE) 50 MCG/ACT nasal spray Spray 1 spray into both nostrils 2 times daily as needed , Historical      KLOR-CON 20 MEQ CR tablet TAKE 1 TABLET (20 MEQ) BY MOUTH DAILY, Disp-90 tablet, R-1, E-Prescribe      magnesium oxide (MAG-OX) 400 MG tablet Take 1 tablet (400 mg) by mouth 2 times daily, Disp-180 tablet, R-3, E-PrescribeIncrease in dose       mesalamine (ASACOL HD) 800 MG EC tablet Take 1 tablet (800 mg) by mouth 3 times daily, Disp-270 tablet, R-1, E-Prescribe      multivitamin w/minerals (THERA-VIT-M) tablet Take 1 tablet by mouth daily, OTC      mycophenolate (GENERIC EQUIVALENT) 250 MG capsule Take 6 capsules (1,500 mg) by mouth 2 times daily, Disp-360 capsule, R-11, E-Prescribe      omeprazole (PRILOSEC) 40 MG DR capsule Take 1 capsule (40 mg) by mouth daily, Historical      ondansetron (ZOFRAN-ODT) 4 MG ODT tab DISSOLVE 1 TABLET ON THE TONGUE EVERY EIGHT HOURS AS NEEDED, R-3, Historical      polyethylene glycol (MIRALAX/GLYCOLAX) powder Take 1 capful by mouth daily, Historical      rosuvastatin (CRESTOR) 10 MG tablet Take 1 tablet (10 mg) by mouth daily, Disp-90 tablet, R-1, E-Prescribe      senna (SENOKOT) 8.6 MG tablet Take 2 tablets by mouth 2 times daily as needed for constipation, Historical         STOP taking these medications       tacrolimus (GENERIC EQUIVALENT) 0.5 MG capsule Comments:   Reason for Stopping:         vancomycin (VANCOCIN) 125 MG capsule Comments:   Reason for Stopping:               DISCHARGE DISPOSITION: Everton Larios will discharge to home in stable condition.     DISCHARGE INSTRUCTIONS:  Discharge Procedure Orders   CBC with platelets   Standing Status: Future Standing Exp. Date: 09/28/20   Order Comments: Last Lab Result: Hemoglobin (g/dL)       Date                     Value                 09/27/2019               7.7 (L)          ----------   Scheduling Instructions: weekly     Basic metabolic panel   Standing Status: Future Standing Exp. Date: 09/28/20   Scheduling Instructions: weekly     CMV DNA quantification   Standing Status: Future Standing Exp. Date: 09/28/20   Scheduling Instructions: weekly     Medication Therapy Management Referral   Referral Priority: Routine Referral Type: Med Therapy Management   Requested Specialty: Pharmacist   Number of Visits Requested: 1     [facility] with [facility]    Standing Status: Future Standing Exp. Date: 09/28/20     Infectious Disease Referral     GASTROENTEROLOGY ADULT REF CONSULT ONLY   Referral Priority: Routine   Number of Visits Requested: 1     Adult [facility] Follow-up and recommended labs and tests   Order Comments: Follow up with [facility] to evaluate medication change and for hospital follow- up in the next two weeks.  The following labs/tests are recommended: CBC, BMP, and CMV PCR weekly.    Follow up with GI in two weeks.  Follow up with ID in two weeks.    Appointments on [place] and/or [facility] (with [facility] or [facility] provider or service). Call [phone] if you haven't heard regarding these appointments within 7 days of discharge.     Activity   Order Comments: Your activity upon discharge: activity as tolerated     Order Specific Question Answer Comments   Is discharge order? Yes      Reason for your hospital stay   Order Comments: You were hospitalized with CMV viremia and c diff colitis. You were seen by the infectious disease and GI doctors while here and will need to follow up with each of them after discharge. Continue vancomycin to treat the c diff infection for a 14 day course. Follow up for bloodwork. Follow up with the [facility] in the next 2 weeks.    Call your PCP to schedule a routine post-hospitalization follow-up within the next 4-6 weeks. Discuss getting a shingles shot at that time.     Full Code     Order Specific Question Answer Comments   Code status determined by: Discussion with patient/legal decision maker      Diet   Order Comments: Follow this diet upon discharge: Orders Placed This Encounter      Calorie Counts      Snacks/Supplements Adult: Other; PRN snacks/supplements; Between Meals      Snacks/Supplements Adult: Magic Cup; Between Meals      Combination Diet Regular Diet Adult; No Caffeine Diet; No Caffeine for 24 hours (once tests completed, may have caffeine)     Order Specific Question Answer Comments   Is  discharge order? Yes        60 minutes spent in discharge, including >50% in counseling and coordination of care, medication review and plan of care recommended on follow up. Questions were answered.     It was our pleasure to care for Everton Larios during this hospitalization. Please do not hesitate to contact me should there be questions regarding the hospital course or discharge plan.      Patient was seen and discussed with Dr. Beck.       Faculty Attestation  Rosendo Beck M.D.    I personally saw and examined this patient, reviewed recent laboratories and imaging studies, discussed the case with the housestaff, and conveyed plan to the patient.  I answered all questions from patient and/or family. I agree with the examination, assessment and discharge plan outlined here.          Sharmaine Dsouza  PGY-2, Internal Medicine  P: [phone]    9/28/2019

## 2019-09-29 ENCOUNTER — PATIENT OUTREACH (OUTPATIENT)
Dept: CARE COORDINATION | Facility: CLINIC | Age: 54
End: 2019-09-29

## 2019-09-29 LAB
BACTERIA SPEC CULT: NO GROWTH
Lab: NORMAL
SPECIMEN SOURCE: NORMAL

## 2019-09-30 ENCOUNTER — TELEPHONE (OUTPATIENT)
Dept: INFECTIOUS DISEASES | Facility: CLINIC | Age: 54
End: 2019-09-30

## 2019-09-30 ENCOUNTER — TELEPHONE (OUTPATIENT)
Dept: GASTROENTEROLOGY | Facility: CLINIC | Age: 54
End: 2019-09-30

## 2019-09-30 DIAGNOSIS — Z94.1 TRANSPLANTED HEART (H): Primary | ICD-10-CM

## 2019-09-30 LAB
CALPROTECTIN STL-MCNT: 951 MG/KG (ref 0–49.9)
INTERPRETATION ECG - MUSE: NORMAL

## 2019-09-30 NOTE — TELEPHONE ENCOUNTER
----- Message from Carlyle Terrazas MD sent at 9/30/2019 10:58 AM CDT -----  Yes thanks  ----- Message -----  From: Erik Waddell  Sent: 9/30/2019   8:31 AM CDT  To: Jocelynn Jensen MD, #    Jocelynn Jensen is booked out until the end of January. Is DB okay?    Thanks    Erik  ----- Message -----  From: Carlyle Newton MD  Sent: 9/28/2019   1:29 PM CDT  To: Erik Llanos    Please schedule patient to see Dr. Delgado in clinic in 6-8 weeks thanks       Carlyle

## 2019-09-30 NOTE — TELEPHONE ENCOUNTER
[facility]    Phone Message    May a detailed message be left on voicemail: yes    Reason for Call: Other: Patient is calling in to schedule hospital follow up for 2-4 weeks and i do not have anything for  in 2-4 weeks. Please follow up with the patient to schedule. Thank you.     Action Taken: Message routed to:  [facility]ana

## 2019-09-30 NOTE — TELEPHONE ENCOUNTER
[facility]    Phone Message    May a detailed message be left on voicemail: yes    Reason for Call: Other: Patient needs a HFU scheduled in 2 weeks per [facility] discharge instructions     Action Taken: Message routed to:  [facility] ID

## 2019-09-30 NOTE — PROGRESS NOTES
Patient was contacted by an MD for post DC follow up so no duplicate post DC follow up call will be made.

## 2019-09-30 NOTE — PROGRESS NOTES
Post discharge phone call made. Pt states he is finally starting to feel better. His appetite is improving. He is in the process of scheduling follow up with GI and ID. Pt has an appointment with us in mid October. Lab orders in for this week, pt will have labs done on Thursday 10/3. No medication questions at this time. Pt verbalized an understanding of the plan and will notify me if he experiences any fevers, diarrhea, nausea, or vomiting.

## 2019-09-30 NOTE — TELEPHONE ENCOUNTER
[facility]    Phone Message    May a detailed message be left on voicemail: yes    Reason for Call: Other: Hospital follow up needed- unabe to get him in until December with  Dr. Shukla     Action Taken: Message routed to:  [facility] INFECTIOUS DISEASE ADULT [facility]

## 2019-09-30 NOTE — TELEPHONE ENCOUNTER
[facility] Call [facility]    Phone Message    May a detailed message be left on voicemail: yes    Reason for Call: Pt requesting letter from Dr. Shukla about when he can return back to work. Pt was discharged from hospital on Saturday and has to finish medication (9 days total) before he can go back to work. Pt requesting letter be added to [employer] so he can print off and give to his employer. Please call if you have any questions.     Action Taken: Message routed to:  [facility] Infectious Disease

## 2019-09-30 NOTE — TELEPHONE ENCOUNTER
This phone message is a duplicate of one sent to ID clinic earlier. See earlier phone message to ID clinic for documentation.    Velia Cedillo RN

## 2019-10-02 ENCOUNTER — TELEPHONE (OUTPATIENT)
Dept: TRANSPLANT | Facility: CLINIC | Age: 54
End: 2019-10-02

## 2019-10-02 NOTE — TELEPHONE ENCOUNTER
Per message below from Dr. Shukla, writer wrote a letter excusing patient from work through 10/07/2019. See [employer] message with patient from today, 10/02/2019, for additional information. Per patient request, letter was emailed to patient.    Velia Cedillo RN  --------------------------------------------------------------------------      Ric Shukla MD  You 2 days ago      Hello,   Yes please would you write a letter for him to go back to work whenever he's done with this medication which I think is on 10/7/2019.   Thanks,   KO     Routing comment       You  Ric Shukla MD 2 days ago      Vazquez Huerta,              Let us know if you would like us to write a letter for you!     Thanks,   Velia Cedillo RN     Routing comment

## 2019-10-03 ENCOUNTER — ALLIED HEALTH/NURSE VISIT (OUTPATIENT)
Dept: PHARMACY | Facility: CLINIC | Age: 54
End: 2019-10-03
Payer: COMMERCIAL

## 2019-10-03 ENCOUNTER — HEALTH MAINTENANCE LETTER (OUTPATIENT)
Age: 54
End: 2019-10-03

## 2019-10-03 DIAGNOSIS — E78.2 MIXED HYPERLIPIDEMIA: ICD-10-CM

## 2019-10-03 DIAGNOSIS — K51.919 ULCERATIVE COLITIS WITH COMPLICATION, UNSPECIFIED LOCATION (H): ICD-10-CM

## 2019-10-03 DIAGNOSIS — K21.00 GASTROESOPHAGEAL REFLUX DISEASE WITH ESOPHAGITIS: ICD-10-CM

## 2019-10-03 DIAGNOSIS — Z94.1 HEART REPLACED BY TRANSPLANT (H): Primary | ICD-10-CM

## 2019-10-03 DIAGNOSIS — I10 ESSENTIAL HYPERTENSION: ICD-10-CM

## 2019-10-03 DIAGNOSIS — J30.1 ALLERGIC RHINITIS DUE TO POLLEN, UNSPECIFIED SEASONALITY: ICD-10-CM

## 2019-10-03 DIAGNOSIS — Z94.1 TRANSPLANTED HEART (H): ICD-10-CM

## 2019-10-03 DIAGNOSIS — E86.0 DEHYDRATION: ICD-10-CM

## 2019-10-03 DIAGNOSIS — B25.9 CYTOMEGALOVIRUS INFECTION, UNSPECIFIED CYTOMEGALOVIRAL INFECTION TYPE (H): ICD-10-CM

## 2019-10-03 LAB
ANION GAP SERPL CALCULATED.3IONS-SCNC: 9 MMOL/L (ref 3–14)
BUN SERPL-MCNC: 29 MG/DL (ref 7–30)
CALCIUM SERPL-MCNC: 8.7 MG/DL (ref 8.5–10.1)
CHLORIDE SERPL-SCNC: 110 MMOL/L (ref 94–109)
CO2 SERPL-SCNC: 18 MMOL/L (ref 20–32)
CREAT SERPL-MCNC: 1.61 MG/DL (ref 0.66–1.25)
ERYTHROCYTE [DISTWIDTH] IN BLOOD BY AUTOMATED COUNT: 14.8 % (ref 10–15)
GFR SERPL CREATININE-BSD FRML MDRD: 47 ML/MIN/{1.73_M2}
GLUCOSE SERPL-MCNC: 78 MG/DL (ref 70–99)
HCT VFR BLD AUTO: 25.6 % (ref 40–53)
HGB BLD-MCNC: 8.5 G/DL (ref 13.3–17.7)
MCH RBC QN AUTO: 26 PG (ref 26.5–33)
MCHC RBC AUTO-ENTMCNC: 33.2 G/DL (ref 31.5–36.5)
MCV RBC AUTO: 78 FL (ref 78–100)
PLATELET # BLD AUTO: 386 10E9/L (ref 150–450)
POTASSIUM SERPL-SCNC: 4.3 MMOL/L (ref 3.4–5.3)
RBC # BLD AUTO: 3.27 10E12/L (ref 4.4–5.9)
SODIUM SERPL-SCNC: 137 MMOL/L (ref 133–144)
WBC # BLD AUTO: 2 10E9/L (ref 4–11)

## 2019-10-03 PROCEDURE — 36415 COLL VENOUS BLD VENIPUNCTURE: CPT | Performed by: INTERNAL MEDICINE

## 2019-10-03 PROCEDURE — 85027 COMPLETE CBC AUTOMATED: CPT | Performed by: STUDENT IN AN ORGANIZED HEALTH CARE EDUCATION/TRAINING PROGRAM

## 2019-10-03 PROCEDURE — 99606 MTMS BY PHARM EST 15 MIN: CPT | Performed by: PHARMACIST

## 2019-10-03 PROCEDURE — 99607 MTMS BY PHARM ADDL 15 MIN: CPT | Performed by: PHARMACIST

## 2019-10-03 PROCEDURE — 80048 BASIC METABOLIC PNL TOTAL CA: CPT | Performed by: STUDENT IN AN ORGANIZED HEALTH CARE EDUCATION/TRAINING PROGRAM

## 2019-10-03 PROCEDURE — 80197 ASSAY OF TACROLIMUS: CPT | Performed by: INTERNAL MEDICINE

## 2019-10-03 NOTE — PATIENT INSTRUCTIONS
Recommendations from today's MTM visit:                                                      1. Increase Valcyte (Valganciclovir) to 450mg (1 tab) twice daily. This is the treatment dose for a CMV infection.     It was great to speak with you today.  I value your experience and would be very thankful for your time with providing feedback on our clinic survey. You may receive a survey via email or text message in the next few days.     Next MTM visit: as needed    To schedule another MTM appointment, please call the clinic directly or you may call the MTM scheduling line at [phone] or toll-free at [phone].     My Clinical Pharmacist's contact information:                                                      It was a pleasure talking with you today!  Please feel free to contact me with any questions or concerns you have.      Franko Preciado, PharmD  [facility] Pharmacist    Phone: [phone]

## 2019-10-03 NOTE — PROGRESS NOTES
SUBJECTIVE/OBJECTIVE:                Everton Larios is a 54 year old male called for a transitions of care visit.  He was discharged from [facility] on 9/28/19 for CDIF, CMV infection. Prior to hospitalizationDiarrhea/ nausea/ low appetite.     Chief Complaint: Discuss meds post discharge.    Allergies/ADRs: Reviewed in Epic  Tobacco: No tobacco use   Alcohol: none  Activity: still fatigued/ tired.   PMH: Reviewed in Epic     Medication Adherence/Access:  Patient using pill empty aspirin bottles to set up his medications. He prefers this method.   Patient takes medications 4 time(s) per day. 8:30am and 8:30pm, magnesium in the middle of the day.   Per patient, misses medication 0 times per week.   The patient fills medications at [facility]: NO, fills medications at [facility]    Heart Transplant:  Current immunosuppressants include TAC 3mg BID, MMF 1500mg BID.  Pt reports no side effects  Transplant date: 2/24/19  Estimated Creatinine Clearance: 40.6 mL/min (A) (based on SCr of 1.51 mg/dL (H)).  Current supplements for electrolyte replacement: Mag Oxide 400mg BID, Calcium Carbonate BID  Magnesium   Date Value Ref Range Status   09/23/2019 2.0 1.6 - 2.3 mg/dL Final   tx Coordinator: Jose Arreguin MD: Dr. Corinna Donis, Using Med Card: No, using smaller cards that fit in his wallet.   Immunizations: annual flu shot UTD; [id number]:  Due 6 months post txp; Prevnar 13: 2018; TDaP:  2012; Shingrix: Due 6 months post txp    Pierce's: Pt is taking Omeprazole 40mg once daily. No recent symptoms.      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 STates symptoms have been improving.     Hypertension: Current medications include Amlodipine 5mg daily.  Patient does not self-monitor BP.  Patient reports no current medication side effects.  BP Readings from Last 3 Encounters:   09/28/19 120/74   09/17/19 112/75   09/17/19 112/75     Hyperlipidemia: Current therapy includes  Rosuvastatin 10mg once daily.  Pt reports no significant myalgias or other side effects.  The 10-year ASCVD risk score (Caneadea DC Jr., et al., 2013) is: 9.2%    Values used to calculate the score:      Age: 54 years      Sex: Male      Is Non- : No      Diabetic: Yes      Tobacco smoker: No      Systolic Blood Pressure: 120 mmHg      Is BP treated: Yes      HDL Cholesterol: 48 mg/dL      Total Cholesterol: 152 mg/dL     UC: Pt is taking Mesalamine 800mg TID. Switched to this med from Balsalazide due to the diarrhea, as he thought it was caused by his UC. Turned out to be CDIF.  Managed by Dr. Claudio Velarde at [facility].     Allergic rhinitis: Current medications include cetirizine 10mg once daily. Primary symptoms are post-nasal drip and itchy/watery eyes. Pt feels that current therapy is effective.     Today's Vitals: There were no vitals taken for this visit.    ASSESSMENT:                 Current medications were reviewed today.      Medication Adherence: good, no issues identified    Heart Transplant: No changes.    Pierce's: Stable.     CDIF/ CMV infections: Valcyte dose should be 450mg BID per current CrCl and discharge summary. Pt directed to change dose.     Hypertension: Stable.     Hyperlipidemia: Stable.     UC: Stable.     Allergic rhinitis: Stable.     PLAN:                Post Discharge Medication Reconciliation Status: discharge medications reconciled and changed, per note/orders (see AVS).    Pt to...  1. Increase valcyte to prescribed dose of 450mg BID.     I spent 20 minutes with this patient today. I offer these suggestions for consideration by txp team. A copy of the visit note was provided to the patient's referring provider.    Will follow up as needed.    The patient was given a summary of these recommendations as an after visit summary.    Franko Preciado, PharmD  [facility] Pharmacist    Phone: [phone]

## 2019-10-03 NOTE — Clinical Note
Injectable influenza vaccine documentation    1. Has the patient received the information for the influenza vaccine? YES    2. Does the patient have a severe allergy to eggs (Patients with a severe egg allergy should be assessed by a medical provider, RN, or clinical pharmacist. If they receive the influenza vaccine, please have them observed for 15 minutes.)? No    3. Has the patient had an allergic reaction to previous influenza vaccines? No    4. Has the patient had any severe allergic reactions to past influenza vaccines ? No       5. Does patient have a history of Guillain-Kingston syndrome? No      Based on responses above, I administered the influenza vaccine.  Christina Flores     Potential access sites were evaluated for patency using ultrasound.   The right jugular vein was selected. Access was obtained under with Sonosite guidance using a standard 18 guage needle with direct visualization of needle entry.

## 2019-10-04 LAB
CMV DNA SPEC NAA+PROBE-ACNC: 366 [IU]/ML
CMV DNA SPEC NAA+PROBE-LOG#: 2.6 {LOG_IU}/ML
SPECIMEN SOURCE: ABNORMAL
TACROLIMUS BLD-MCNC: 9.9 UG/L (ref 5–15)
TME LAST DOSE: NORMAL H

## 2019-10-04 NOTE — TELEPHONE ENCOUNTER
Sari Collins  You 2 days ago      Kolton Mandujano,   I got him scheduled with Dr. Shukla on 10/15 at 2:30.    Routing comment       You  [facility] 4 days ago      Can someone ask pt if he can come to this appt below? Thanks!   Nazia    Routing comment       Ric Shukla MD You 4 days ago      Hello,   I can see him 2:30 PM as an addon 10/15/2019.   Thanks,   KO     Routing comment       You  Ric Shukla MD 4 days ago      Hey Dr. Shukla, Does he need a hosp f/u appt?   Nazia

## 2019-10-07 DIAGNOSIS — B25.9 CYTOMEGALOVIRUS INFECTION, UNSPECIFIED CYTOMEGALOVIRAL INFECTION TYPE (H): ICD-10-CM

## 2019-10-07 DIAGNOSIS — Z94.1 HEART REPLACED BY TRANSPLANT (H): ICD-10-CM

## 2019-10-07 RX ORDER — VALGANCICLOVIR 450 MG/1
450 TABLET, FILM COATED ORAL DAILY
Qty: 60 TABLET | Refills: 2 | Status: SHIPPED | OUTPATIENT
Start: 2019-10-07 | End: 2019-10-17

## 2019-10-07 RX ORDER — TACROLIMUS 1 MG/1
CAPSULE ORAL
Qty: 30 CAPSULE | Refills: 3 | Status: SHIPPED | OUTPATIENT
Start: 2019-10-07 | End: 2019-10-25

## 2019-10-07 NOTE — RESULT ENCOUNTER NOTE
Tacro 9.9. Goal 6-8. 13 hour trough. Was taking 3 mg bid. Instructions given to decrease to 3/2. Repeat a level in 1 week.

## 2019-10-08 ENCOUNTER — TELEPHONE (OUTPATIENT)
Dept: TRANSPLANT | Facility: CLINIC | Age: 54
End: 2019-10-08

## 2019-10-08 DIAGNOSIS — Z94.1 TRANSPLANTED HEART (H): Primary | ICD-10-CM

## 2019-10-08 NOTE — TELEPHONE ENCOUNTER
Pt called and the following information provided      Valcyte co-pay card reinstatement, to enroll in the program and activate their Valcyte co-pay cards, patients can call [phone].    To help with the cost of his valcyte.

## 2019-10-10 ENCOUNTER — NON-VISIT BILLABLE ENCOUNTER (OUTPATIENT)
Dept: CARE COORDINATION | Facility: CLINIC | Age: 54
End: 2019-10-10

## 2019-10-11 NOTE — PROGRESS NOTES
Transplant  notified by pt's post-transplant coordinator that pt is having financial difficulties for his Valcyte. Reviewed pt's insurance information and he is not eligible for the co-pay card as he has a Medicare product. Writer has referred pt to Zeferino Collier, Transplant Pharmacy Liaison, to look into if pt qualifies for patient assistance program and assist pt in applying, if he qualifies.

## 2019-10-14 ENCOUNTER — PRE VISIT (OUTPATIENT)
Dept: TRANSPLANT | Facility: CLINIC | Age: 54
End: 2019-10-14
Payer: COMMERCIAL

## 2019-10-14 DIAGNOSIS — Z94.1 TRANSPLANTED HEART (H): Primary | ICD-10-CM

## 2019-10-14 DIAGNOSIS — D64.9 LOW HEMOGLOBIN: ICD-10-CM

## 2019-10-14 RX ORDER — POTASSIUM CHLORIDE 1500 MG/1
40 TABLET, EXTENDED RELEASE ORAL
Status: CANCELLED | OUTPATIENT
Start: 2019-10-14

## 2019-10-14 RX ORDER — POTASSIUM CHLORIDE 1500 MG/1
20 TABLET, EXTENDED RELEASE ORAL
Status: CANCELLED | OUTPATIENT
Start: 2019-10-14

## 2019-10-14 RX ORDER — LIDOCAINE 40 MG/G
CREAM TOPICAL
Status: CANCELLED | OUTPATIENT
Start: 2019-10-14

## 2019-10-14 RX ORDER — SODIUM CHLORIDE 9 MG/ML
INJECTION, SOLUTION INTRAVENOUS CONTINUOUS
Status: CANCELLED | OUTPATIENT
Start: 2019-10-14

## 2019-10-15 ENCOUNTER — HOSPITAL ENCOUNTER (OUTPATIENT)
Facility: CLINIC | Age: 54
Discharge: HOME | End: 2019-10-15
Attending: INTERNAL MEDICINE | Admitting: INTERNAL MEDICINE
Payer: COMMERCIAL

## 2019-10-15 ENCOUNTER — RECORDS - HEALTHEAST (OUTPATIENT)
Dept: ADMINISTRATIVE | Facility: OTHER | Age: 54
End: 2019-10-15

## 2019-10-15 ENCOUNTER — RESULTS ONLY (OUTPATIENT)
Dept: OTHER | Facility: CLINIC | Age: 54
End: 2019-10-15

## 2019-10-15 ENCOUNTER — APPOINTMENT (OUTPATIENT)
Dept: LAB | Facility: CLINIC | Age: 54
End: 2019-10-15
Attending: INTERNAL MEDICINE
Payer: COMMERCIAL

## 2019-10-15 ENCOUNTER — OFFICE VISIT (OUTPATIENT)
Dept: INFECTIOUS DISEASES | Facility: CLINIC | Age: 54
End: 2019-10-15
Attending: INTERNAL MEDICINE
Payer: COMMERCIAL

## 2019-10-15 ENCOUNTER — APPOINTMENT (OUTPATIENT)
Dept: MEDSURG UNIT | Facility: CLINIC | Age: 54
End: 2019-10-15
Attending: INTERNAL MEDICINE
Payer: COMMERCIAL

## 2019-10-15 ENCOUNTER — HOSPITAL ENCOUNTER (OUTPATIENT)
Dept: CARDIOLOGY | Facility: CLINIC | Age: 54
Discharge: HOME | End: 2019-10-15
Attending: INTERNAL MEDICINE | Admitting: INTERNAL MEDICINE
Payer: COMMERCIAL

## 2019-10-15 VITALS
SYSTOLIC BLOOD PRESSURE: 137 MMHG | DIASTOLIC BLOOD PRESSURE: 80 MMHG | HEIGHT: 68 IN | TEMPERATURE: 98.5 F | HEART RATE: 120 BPM | OXYGEN SATURATION: 100 % | BODY MASS INDEX: 20.38 KG/M2 | WEIGHT: 134.5 LBS

## 2019-10-15 VITALS
HEIGHT: 68 IN | OXYGEN SATURATION: 100 % | BODY MASS INDEX: 17.43 KG/M2 | WEIGHT: 115 LBS | SYSTOLIC BLOOD PRESSURE: 123 MMHG | RESPIRATION RATE: 18 BRPM | DIASTOLIC BLOOD PRESSURE: 83 MMHG

## 2019-10-15 DIAGNOSIS — B25.9 CYTOMEGALOVIRUS (CMV) VIREMIA (H): Primary | ICD-10-CM

## 2019-10-15 DIAGNOSIS — Z23 NEED FOR SHINGLES VACCINE: ICD-10-CM

## 2019-10-15 DIAGNOSIS — Z94.1 TRANSPLANTED HEART (H): ICD-10-CM

## 2019-10-15 DIAGNOSIS — B25.9 CYTOMEGALOVIRUS (CMV) VIREMIA (H): ICD-10-CM

## 2019-10-15 DIAGNOSIS — D72.819 LEUKOPENIA, UNSPECIFIED TYPE: ICD-10-CM

## 2019-10-15 DIAGNOSIS — D64.9 LOW HEMOGLOBIN: ICD-10-CM

## 2019-10-15 DIAGNOSIS — N17.9 ACUTE RENAL FAILURE, UNSPECIFIED ACUTE RENAL FAILURE TYPE (H): ICD-10-CM

## 2019-10-15 LAB
ALBUMIN SERPL-MCNC: 3.6 G/DL (ref 3.4–5)
ALP SERPL-CCNC: 230 U/L (ref 40–150)
ALT SERPL W P-5'-P-CCNC: 29 U/L (ref 0–70)
ANION GAP SERPL CALCULATED.3IONS-SCNC: 8 MMOL/L (ref 3–14)
AST SERPL W P-5'-P-CCNC: 15 U/L (ref 0–45)
BILIRUB SERPL-MCNC: 0.4 MG/DL (ref 0.2–1.3)
BUN SERPL-MCNC: 31 MG/DL (ref 7–30)
CALCIUM SERPL-MCNC: 9.1 MG/DL (ref 8.5–10.1)
CHLORIDE SERPL-SCNC: 107 MMOL/L (ref 94–109)
CK SERPL-CCNC: 56 U/L (ref 30–300)
CO2 SERPL-SCNC: 24 MMOL/L (ref 20–32)
CREAT SERPL-MCNC: 1.2 MG/DL (ref 0.66–1.25)
ERYTHROCYTE [DISTWIDTH] IN BLOOD BY AUTOMATED COUNT: 15.6 % (ref 10–15)
FERRITIN SERPL-MCNC: 171 NG/ML (ref 26–388)
GFR SERPL CREATININE-BSD FRML MDRD: 67 ML/MIN/{1.73_M2}
GLUCOSE SERPL-MCNC: 82 MG/DL (ref 70–99)
HCT VFR BLD AUTO: 28.7 % (ref 40–53)
HGB BLD-MCNC: 8.5 G/DL (ref 13.3–17.7)
IRON SATN MFR SERPL: 38 % (ref 15–46)
IRON SERPL-MCNC: 114 UG/DL (ref 35–180)
MAGNESIUM SERPL-MCNC: 1.8 MG/DL (ref 1.6–2.3)
MCH RBC QN AUTO: 25.8 PG (ref 26.5–33)
MCHC RBC AUTO-ENTMCNC: 29.6 G/DL (ref 31.5–36.5)
MCV RBC AUTO: 87 FL (ref 78–100)
PHOSPHATE SERPL-MCNC: 4.2 MG/DL (ref 2.5–4.5)
PLATELET # BLD AUTO: 360 10E9/L (ref 150–450)
POTASSIUM SERPL-SCNC: 4.6 MMOL/L (ref 3.4–5.3)
PROT SERPL-MCNC: 7 G/DL (ref 6.8–8.8)
RBC # BLD AUTO: 3.3 10E12/L (ref 4.4–5.9)
SODIUM SERPL-SCNC: 139 MMOL/L (ref 133–144)
TACROLIMUS BLD-MCNC: 6.7 UG/L (ref 5–15)
TIBC SERPL-MCNC: 304 UG/DL (ref 240–430)
TME LAST DOSE: NORMAL H
WBC # BLD AUTO: 2.7 10E9/L (ref 4–11)

## 2019-10-15 PROCEDURE — 86833 HLA CLASS II HIGH DEFIN QUAL: CPT | Performed by: INTERNAL MEDICINE

## 2019-10-15 PROCEDURE — 83550 IRON BINDING TEST: CPT | Performed by: INTERNAL MEDICINE

## 2019-10-15 PROCEDURE — 88307 TISSUE EXAM BY PATHOLOGIST: CPT | Performed by: INTERNAL MEDICINE

## 2019-10-15 PROCEDURE — 93005 ELECTROCARDIOGRAM TRACING: CPT

## 2019-10-15 PROCEDURE — 83735 ASSAY OF MAGNESIUM: CPT | Performed by: INTERNAL MEDICINE

## 2019-10-15 PROCEDURE — 40000166 ZZH STATISTIC PP CARE STAGE 1

## 2019-10-15 PROCEDURE — 85027 COMPLETE CBC AUTOMATED: CPT | Performed by: INTERNAL MEDICINE

## 2019-10-15 PROCEDURE — 93505 ENDOMYOCARDIAL BIOPSY: CPT | Performed by: INTERNAL MEDICINE

## 2019-10-15 PROCEDURE — G0463 HOSPITAL OUTPT CLINIC VISIT: HCPCS | Mod: ZF

## 2019-10-15 PROCEDURE — 84238 ASSAY NONENDOCRINE RECEPTOR: CPT | Performed by: INTERNAL MEDICINE

## 2019-10-15 PROCEDURE — 83540 ASSAY OF IRON: CPT | Performed by: INTERNAL MEDICINE

## 2019-10-15 PROCEDURE — 36415 COLL VENOUS BLD VENIPUNCTURE: CPT | Performed by: INTERNAL MEDICINE

## 2019-10-15 PROCEDURE — 80197 ASSAY OF TACROLIMUS: CPT | Performed by: INTERNAL MEDICINE

## 2019-10-15 PROCEDURE — 82550 ASSAY OF CK (CPK): CPT | Performed by: INTERNAL MEDICINE

## 2019-10-15 PROCEDURE — 80053 COMPREHEN METABOLIC PANEL: CPT | Performed by: INTERNAL MEDICINE

## 2019-10-15 PROCEDURE — 88350 IMFLUOR EA ADDL 1ANTB STN PX: CPT | Performed by: INTERNAL MEDICINE

## 2019-10-15 PROCEDURE — 40000065 ZZH STATISTIC EKG NON-CHARGEABLE

## 2019-10-15 PROCEDURE — 93010 ELECTROCARDIOGRAM REPORT: CPT | Performed by: INTERNAL MEDICINE

## 2019-10-15 PROCEDURE — 82728 ASSAY OF FERRITIN: CPT | Performed by: INTERNAL MEDICINE

## 2019-10-15 PROCEDURE — 84100 ASSAY OF PHOSPHORUS: CPT | Performed by: INTERNAL MEDICINE

## 2019-10-15 PROCEDURE — 93306 TTE W/DOPPLER COMPLETE: CPT

## 2019-10-15 PROCEDURE — 27210794 ZZH OR GENERAL SUPPLY STERILE: Performed by: INTERNAL MEDICINE

## 2019-10-15 PROCEDURE — 86352 CELL FUNCTION ASSAY W/STIM: CPT | Performed by: INTERNAL MEDICINE

## 2019-10-15 PROCEDURE — 93306 TTE W/DOPPLER COMPLETE: CPT | Mod: 26 | Performed by: INTERNAL MEDICINE

## 2019-10-15 PROCEDURE — 88346 IMFLUOR 1ST 1ANTB STAIN PX: CPT | Performed by: INTERNAL MEDICINE

## 2019-10-15 PROCEDURE — 25000125 ZZHC RX 250: Performed by: INTERNAL MEDICINE

## 2019-10-15 PROCEDURE — 93505 ENDOMYOCARDIAL BIOPSY: CPT | Mod: 26 | Performed by: INTERNAL MEDICINE

## 2019-10-15 PROCEDURE — 87799 DETECT AGENT NOS DNA QUANT: CPT | Performed by: INTERNAL MEDICINE

## 2019-10-15 PROCEDURE — 86832 HLA CLASS I HIGH DEFIN QUAL: CPT | Performed by: INTERNAL MEDICINE

## 2019-10-15 RX ORDER — SODIUM CHLORIDE 9 MG/ML
INJECTION, SOLUTION INTRAVENOUS CONTINUOUS
Status: DISCONTINUED | OUTPATIENT
Start: 2019-10-15 | End: 2019-10-15

## 2019-10-15 RX ORDER — LIDOCAINE 40 MG/G
CREAM TOPICAL
Status: DISCONTINUED | OUTPATIENT
Start: 2019-10-15 | End: 2019-10-15 | Stop reason: HOSPADM

## 2019-10-15 RX ORDER — POTASSIUM CHLORIDE 750 MG/1
40 TABLET, EXTENDED RELEASE ORAL
Status: DISCONTINUED | OUTPATIENT
Start: 2019-10-15 | End: 2019-10-15 | Stop reason: HOSPADM

## 2019-10-15 RX ORDER — POTASSIUM CHLORIDE 750 MG/1
20 TABLET, EXTENDED RELEASE ORAL
Status: DISCONTINUED | OUTPATIENT
Start: 2019-10-15 | End: 2019-10-15 | Stop reason: HOSPADM

## 2019-10-15 RX ADMIN — LIDOCAINE: 40 CREAM TOPICAL at 10:00

## 2019-10-15 ASSESSMENT — MIFFLIN-ST. JEOR
SCORE: 1326.14
SCORE: 1414.46

## 2019-10-15 ASSESSMENT — PAIN SCALES - GENERAL: PAINLEVEL: MODERATE PAIN (5)

## 2019-10-15 NOTE — LETTER
10/15/2019      RE: Everton Larios  [address] [id number]       [facility]    Transplant Infectious Diseases Outpatient Progress note      Patient:  Everton Larios, Date of birth 1965, Medical record number [id number]  Date of Visit:  10/15/2019         Recommendations:   1. Ideally we should increase the dose of VGCV to 450 mg bid or 900 mg daily, however, the patient is having significant financial strain and not able to afford it.   I will continue VGCV 450 mg daily till CMV PCR 10/15/2019 is resulted.   2. Good candidate for shingrix but he's waiting for it to be covered by his insurance.    3. Continue CMV weekly.     RTC: 1 month.         Summary of Presentation:   Transplants:  2/24/2019 (Heart), Postoperative day:  233     This patient is a 54 year old male with congenital heart disease s/p OHT in 2/2019 with methylprednisolone induction and TAC/MMF for maintenance.   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         Active Problems and Infectious Diseases Issues:   1. CMV viremia.   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   In 7/2019 CMV was 3.5 log. It was repeated 9/17/2019 and resulted at 3.9 log. This very slow replication rate is probably due to CMV +/+.   Now CMV VL is doen to 2.6 log as of 10/3/2019. However this with the need to keep adjusting the dose of VGCV due to renal failure.     Ideally, the patient should be switched to VGCV PO bid due to improving kidney function of 44.   The patient is having a significant financial strain, and is not really able to afford the VGCV.  Given that the [employer] system is down and he's not able to fill the prescription today anyway, I will wait for the results of CMV PCR 10/15/2019 and  hopefully I can move to maintenance therapy rather than induction therapy.        The diarrhea and abdominal pain have both resolved either due to CMV therapy or CDI therapy or both.     He met with our pharmacist to assess eligibility for any coupons/VGCV assistant programs.     2. FRANKLIN on CRF.   Improving.   Will continue to adjust the VGCV dose accordingly.         Old Problems and Infectious Diseases Issues:   1. Drug induced neutropenia resolved after stopping bactrim and VGCV and with couple of doses of G-CSF.  2. P aeruginosa BSI 4/29/2019 with P aeruginosa HCAP and pulmonary nodules in the setting of febrile neutropenia. We treated him for severe pseudomonal pneumonia with 4 week course of ABx till 5/28/2019. He was also treated with IV micafungin till 5/28/2019 due to pulmonary nodules and high risk for aspergillus given positive BD glucan. CT scan continues to improve while off of antimicrobilals as evidenced on a CT done this admission on 9/24/2019 while off of ABx and antifungals.    3. QTc prolongation. Prevented the use of azole and fluoroquinolones.   4. CDI 9/2019 treated with 2 weeks of PO vancomycin.       Other Infectious Disease issues include:  - QTc 532 9/23/2019.   - PCP prophylaxis: off of bactrim since 4/26/2019. Was given pentamidine till 8/2019.   - Serostatus: CMV D+/R+, EBV D+/R+, HSV1+/2-, VZV +               currently on VGCV for induction therapy.   - Immunization status: good candidate for shingrix but he's waiting for it to be covered by his insurance.    - Gamma globulin status: 358 5/1/2019.       Attestation:  I interviewed the patient and obtained history from the patient, and by reviewing the patient's chart including outside records, microbiological data, and radiological data. All data are summarized in this notes.  Ric Shukla MD   Pager: [phone]  10/15/2019         Interim History:   Diarrhea and abdominal pain resolved.   He is very frustrated with the medical  bills and now wondering if the OHT was actually worth it because he can't even pay his bills anymore.         HPI:       Transplants:  2/24/2019 (Heart); Postoperative day:  233    This patient is a 54 year old male with congenital heart disease s/p OHT in 2/2019 with methylprednisolone induction and TAC/MMF/prednisone for maintenance.      The course was complicated by neutropenia late 4/2019. He was then admitted with fever, neutropenia dyspnea, and cough.   Was found to have multiple pulmonary nodules with GGO on chest CT. Blood cx on 4/29/2019 and BAL on 4/30/2019 grew P aeruginosa.   Treated with cefepime IV for 4 weeks.   Due to multiple risk factors for aspergillosis including neutropenia and positive BD glucan, he was also empirically treated with micafunin IV starting 5/6/2019.   We could not use azoles and fluoroquinolones due to prolonged QTc at baseline. Costs prohibited the use of isavuconazole.      Neutropenia resolved after the 2 doses of G-CSF on 5/1/2019 and 5/2/2019. Also after the discontinuation of VGCV and bactrim and decreasing the dose of MMF.       Due to neutropenia and inability to use VGCV, ID recommended weekly CMV PCR in the setting of increasing MMF.   In July of 2019 his CMV VL was 3.5 log, it was repeated in 9/2019 and was 3.9 log. He was started on VGCV adjusted to kidney function on 9/20/2019.  He was also complaining of diarrhea and abdominal pain. I thought the abdominal pain was due to CMV. He was evaluated the same day by GI due to hx of UC. His C diff test was positive and was treated with 14 days of vancomycin PO.               Past Medical History:     Past Medical History:   Diagnosis Date     Alcohol abuse      Pierce's esophagus 10/4/2018     Chronic rhinitis 10/4/2018     Chronic systolic heart failure (H) 10/4/2018     Depression 10/4/2018     Diastolic dysfunction      DJD (degenerative joint disease) - neck 10/4/2018     H/O congenital atrial septal defect (ASD)  repair at age 3 10/4/2018     Hypothyroidism due to medication 10/4/2018     ICD, [place] [employer] 2008; gen change 2/2018 10/4/2018     Intermittent asthma without complication 10/4/2018     Nonischemic cardiomyopathy (H) 10/4/2018     On amiodarone therapy 10/4/2018     Paroxysmal atrial fibrillation (H) 10/4/2018     S/P ablation of atrial fibrillation 10/4/2018     Systolic heart failure (H)      Ulcerative colitis (H) 10/4/2018     Ventricular tachycardia (H) 10/4/2018             Past Surgical History:     Past Surgical History:   Procedure Laterality Date     AICD, DUAL CHAMBER       BRONCHOSCOPY (RIGID OR FLEXIBLE), DIAGNOSTIC N/A 4/30/2019    Procedure: BRONCHOSCOPY, WITH BAL;  Surgeon: Margot Chiang MD;  Location:  GI     CV HEART BIOPSY N/A 3/4/2019    Procedure: Heart Biopsy;  Surgeon: Abhijeet Titus MD;  Location:  HEART CARDIAC CATH LAB     CV HEART BIOPSY N/A 3/25/2019    Procedure: Heart Biopsy;  Surgeon: Yves Rodrigues MD;  Location:  HEART CARDIAC CATH LAB     CV HEART BIOPSY N/A 3/28/2019    Procedure: Heart Cath Heart Biopsy;  Surgeon: Yves Rodrigues MD;  Location:  HEART CARDIAC CATH LAB     CV HEART BIOPSY N/A 4/10/2019    Procedure: CV HEART BIOPSY;  Surgeon: Isiah Mcallister MD;  Location:  HEART CARDIAC CATH LAB     CV HEART BIOPSY N/A 4/24/2019    Procedure: CV HEART BIOPSY;  Surgeon: Isiah Mcallister MD;  Location:  HEART CARDIAC CATH LAB     CV HEART BIOPSY N/A 5/8/2019    Procedure: CV HEART BIOPSY;  Surgeon: Isiah Mcallister MD;  Location:  HEART CARDIAC CATH LAB     CV HEART BIOPSY N/A 6/4/2019    Procedure: CV HEART BIOPSY;  Surgeon: Alton Solomon MD;  Location:  HEART CARDIAC CATH LAB     CV HEART BIOPSY N/A 5/22/2019    Procedure: CV HEART BIOPSY;  Surgeon: Yves Rodrigues MD;  Location:  HEART CARDIAC CATH LAB     CV HEART BIOPSY N/A 7/17/2019    Procedure: CV HEART BIOPSY;  Surgeon: Isiah Mcallister MD;   Location: [facility]     CV HEART BIOPSY N/A 8/13/2019    Procedure: CV HEART BIOPSY;  Surgeon: Jackson Patten MD;  Location:  HEART CARDIAC CATH LAB     CV HEART BIOPSY N/A 10/15/2019    Procedure: CV HEART BIOPSY;  Surgeon: Santino Mckeon MD;  Location:  HEART CARDIAC CATH LAB     CV INTRA-AORTIC BALLOON PUMP INSERTION N/A 2/18/2019    Procedure: Intra-Aortic Balloon;  Surgeon: Alton Solomon MD;  Location:  HEART CARDIAC CATH LAB     CV INTRA-AORTIC BALLOON PUMP INSERTION N/A 2/20/2019    Procedure: Replace subclavian IABP;  Surgeon: Yves Rodrigues MD;  Location:  HEART CARDIAC CATH LAB     CV LEFT HEART CATH N/A 3/19/2019    Procedure: Left Heart Cath;  Surgeon: Yves Rodrigues MD;  Location:  HEART CARDIAC CATH LAB     CV RIGHT HEART CATH N/A 3/19/2019    Procedure: RHC/HBx - Femoral access for 3/19 per Nimisha GONZALEZ;  Surgeon: Yves Rodrigues MD;  Location:  HEART CARDIAC CATH LAB     CV RIGHT HEART CATH N/A 3/25/2019    Procedure: Right Heart Cath;  Surgeon: Yves Rodrigues MD;  Location:  HEART CARDIAC CATH LAB     CV RIGHT HEART CATH N/A 3/28/2019    Procedure: Heart Cath Right Heart Cath;  Surgeon: Yves Rodrigues MD;  Location:  HEART CARDIAC CATH LAB     CV RIGHT HEART CATH N/A 4/24/2019    Procedure: CV RIGHT HEART CATH;  Surgeon: Isiah Mcallister MD;  Location:  HEART CARDIAC CATH LAB     CV RIGHT HEART CATH N/A 3/11/2019    Procedure: ADD ON RHC/HBX;  Surgeon: Alton Solomon MD;  Location:  HEART CARDIAC CATH LAB     CV RIGHT HEART CATH N/A 6/4/2019    Procedure: CV RIGHT HEART CATH;  Surgeon: Alton Solomon MD;  Location:  HEART CARDIAC CATH LAB     CV RIGHT HEART CATH N/A 5/22/2019    Procedure: CV RIGHT HEART CATH;  Surgeon: Yves Rodrigues MD;  Location:  HEART CARDIAC CATH LAB     CV RIGHT HEART CATH N/A 8/13/2019    Procedure: CV RIGHT HEART CATH;  Surgeon: Jackson Patten MD;   [facility]     CV RIGHT HEART CATH N/A 10/15/2019    Procedure: CV RIGHT HEART CATH;  Surgeon: Santino Mckeon MD;  [facility]: [facility]     INCISION AND DRAINAGE CHEST WASHOUT, COMBINED N/A 3/21/2019    Procedure: Incision And Drainage; Evacuation Right Chest Wall Hematoma;  Surgeon: Dewayne House MD;  Location: [facility]     INSERT INTRAAORTIC BALLOON PUMP Left 2019    Procedure: Insert left  Subclavian Balloon Pump,  Removal Right femoral arterial balloom pump sheath;  Surgeon: Dewayne House MD;  [facility]     INSERT INTRAAORTIC BALLOON PUMP Left 2019    Procedure: SUBCLAVIAN BALLOON PUMP PLACEMENT;  Surgeon: Ben White MD;  [facility]     IR PICC PLACEMENT > 5 YRS OF AGE  2019     TRANSPLANT HEART RECIPIENT N/A 2019    Procedure: TRANSPLANT HEART RECIPIENT;  Surgeon: Dewayne House MD;  Location: [facility]               Social History:     Social History     Tobacco Use     Smoking status: Former Smoker     Packs/day: 0.00     Years: 10.00     Pack years: 0.00     Start date: 1980     Last attempt to quit: 2008     Years since quittin.7     Smokeless tobacco: Never Used   Substance Use Topics     Alcohol use: Not Currently     Alcohol/week: 1.0 standard drinks     Types: 1 Cans of beer per week             Family History:     Family History   Problem Relation Age of Onset     Endometrial Cancer Mother      Hypertension Father      Endometrial Cancer Maternal Grandmother              Immunizations:     Immunization History   Administered Date(s) Administered     Influenza (IIV3) PF 10/22/2008, 2009, 2011, 2012, 10/27/2013, 10/02/2014     Influenza Vaccine IM > 6 months Valent IIV4 10/02/2015     Influenza Vaccine, 3 YRS +, IM (QUADRIVALENT W/PRESERVATIVES) 2016, 10/16/2017, 2018     OPV, trivalent, live 10/20/1978     Pneumo Conj 13-V (2010&after) 2018     Pneumococcal 23  "valent 09/28/2019     TDAP Vaccine (Boostrix) 07/20/2012     Td (Adult), Adsorbed 10/20/1978             Allergies:     Allergies   Allergen Reactions     Hydromorphone Other (See Comments)     Significant Delirium     Adhesive Tape Blisters     Tegaderm causes bruises, blisters and extreme irritation.     Lisinopril Other (See Comments)     hypotension     Quinolones Other (See Comments)     Would not rx quinolones until QTc interval improved.      Tobramycin Other (See Comments)     Would not use aminoglycosides as his kidney function is very borderline     Codeine Nausea             Medications:     No current outpatient medications on file.     No current facility-administered medications for this visit.             Review of Systems:   As mentioned in the interim history otherwise negative by reviewing constitutional symptoms, central and peripheral neurological systems, respiratory system, cardiac system, GI system,  system, musculoskeletal, skin, allergy, and lymphatics.                  Physical Exam:   /80 (BP Location: Right arm, Patient Position: Sitting, Cuff Size: Adult Regular)   Pulse 120   Temp 98.5  F (36.9  C) (Oral)   Ht 1.727 m (5' 7.99\")   Wt 61 kg (134 lb 8 oz)   SpO2 100%   BMI 20.46 kg/m       Constitutional: awake, alert, cooperative, no apparent distress and appears at stated age, well nourished.   Neurologic: Patient is moving all extremities without focal deficit, no focal sensory loss.   Extremities: no pitting edema of bilateral lower extremities, no ulcers, normal ROM of all joints, no swelling or erythema of any of joints and no tenderness to palpation.            Laboratory Data:     No results found for: ACD4    Inflammatory Markers    Recent Labs   Lab Test 09/24/19  0435 09/23/19  1534 09/17/19  1702 05/15/19  0940 05/02/19  0536 04/29/19  0911 11/15/18  0955 10/22/18  1239   SED  --  17 18  --  37*  --   --   --    CRP <2.9 3.8 <2.9 <2.9 69.0* 120.0* 6.1 12.5 "       Immune Globulin Studies      Recent Labs   Lab Test 09/17/19  1702 05/01/19  1850   IGG  --  358*   IGM  --  29*   IGE  --  38    106       Metabolic Studies    Recent Labs   Lab Test 10/15/19  0835 10/03/19  0913 09/28/19  0537 09/27/19  0446 09/26/19  1127 09/25/19  0507  09/23/19  1534  07/17/19  1030  06/17/19  0926  05/22/19  0926  04/29/19  0918  02/23/19  1835    137 137 135 136 138   < > 134   < > 135   < > 137   < > 138   < >  --    < > 133   POTASSIUM 4.6 4.3 3.7 3.9 4.0 4.0   < > 4.6   < > 4.2   < > 4.1   < > 3.7   < >  --    < > 3.6   CHLORIDE 107 110* 111* 111* 110* 116*   < > 108   < > 103   < > 104   < > 102   < >  --    < > 93*   CO2 24 18* 14* 12* 15* 14*   < > 15*   < > 24   < > 20   < > 25   < >  --    < > 30   ANIONGAP 8 9 12 11 11 8   < > 11   < > 8   < > 13   < > 10   < >  --    < > 10   BUN 31* 29 19 18 17 26   < > 47*   < > 17   < > 27   < > 53*   < >  --    < > 28   CR 1.20 1.61* 1.51* 1.49* 1.56* 1.61*   < > 2.41*   < > 1.07   < > 1.39*   < > 2.43*   < >  --    < > 1.03   GFRESTIMATED 67 47* 51* 52* 49* 47*   < > 29*   < > 77   < > 56*   < > 29*   < >  --    < > 81   GLC 82 78 75 74 87 90   < > 96   < > 80   < > 80   < > 82   < >  --    < > 114*   A1C  --   --   --   --   --   --   --   --   --   --   --   --   --   --   --   --   --  5.7*   RADAMES 9.1 8.7 8.7 8.3* 8.6 8.5   < > 9.0   < > 8.9   < > 8.8   < > 9.3   < >  --    < > 8.9   PHOS 4.2  --   --   --   --   --   --  4.8*  --  4.0  --  3.8   < > 4.2   < >  --    < > 3.6   MAG 1.8  --   --   --   --   --   --  2.0  --  1.7  --  1.4*   < > 1.9   < >  --    < > 2.0   LACT  --   --   --   --   --   --   --  1.2  --   --   --   --   --   --   --  1.7   < >  --    CKT 56  --   --   --   --   --   --   --   --  34  --  46  --  26*  --   --   --   --     < > = values in this interval not displayed.       Hepatic Studies    Recent Labs   Lab Test 10/15/19  0835 09/28/19  0537 09/27/19  0446 09/26/19  1124 09/25/19  0500  09/24/19  0435  03/14/19  1653  03/10/19  0410   BILITOTAL 0.4 0.5 0.4 0.4 0.3 0.4   < >  --    < > 0.8   ALKPHOS 230* 221* 233* 272* 237* 218*   < >  --    < > 104   PROTTOTAL 7.0 6.0* 5.9* 6.7* 5.7* 5.4*   < >  --    < > 5.0*   ALBUMIN 3.6 3.2* 3.0* 3.4 3.1* 2.9*   < >  --    < > 2.8*   AST 15 14 10 15 11 11   < >  --    < > 22   ALT 29 14 14 17 15 14   < >  --    < > 19   LDH  --   --   --   --   --   --   --  320*  --  276*    < > = values in this interval not displayed.       Hematology Studies     Recent Labs   Lab Test 10/15/19  0835 10/03/19  0913 09/28/19  0537 09/27/19  0446 09/26/19  1127 09/26/19  0424 09/25/19  0507 09/24/19  0437  09/23/19  1534 09/17/19  1702 09/17/19  0856   WBC 2.7* 2.0* 3.5* 4.0 4.2  --  3.5* 3.6*  --  4.6 3.7* 3.5*   ANEU  --   --  2.7 2.9  --   --   --  1.9  --  2.9 2.0 1.9   ALYM  --   --  0.5* 0.9  --   --   --  0.8  --  1.0 1.1 0.9   GRACE  --   --  0.1 0.1  --   --   --  0.4  --  0.4 0.4 0.5   AEOS  --   --  0.2 0.1  --   --   --  0.3  --  0.3 0.3 0.3   HGB 8.5* 8.5* 8.0* 7.7* 9.0*  --  8.0* 7.6*  --  10.2* 10.1* 9.5*   HCT 28.7* 25.6* 24.8* 24.2* 28.8*  --  25.7* 24.2*  --  32.7* 32.8* 28.5*    386 247 231 243 247 228 234   < > 290 284 279    < > = values in this interval not displayed.       Clotting Studies    Recent Labs   Lab Test 09/23/19  1534 05/08/19  0504 05/07/19  0451 05/06/19  0438  03/14/19  1653  02/27/19  0421 02/25/19  1502   INR 1.17* 1.22* 1.33* 1.31*   < > 1.36*   < >  --  1.29*   PTT 35  --   --   --   --  46*  --  38* 32    < > = values in this interval not displayed.       Urine Studies    Recent Labs   Lab Test 05/26/19  1006 04/29/19  1031 03/26/19  1707 03/17/19  0045 03/04/19  1303   URINEPH 6.5 6.0 5.5 5.5 5.0   NITRITE Negative Negative Negative Negative Negative   LEUKEST Negative Negative Negative Negative Negative   WBCU 0 <1 2 15* 0         Microbiology:  Last 6 Culture results with specimen source  Culture Micro   Date Value Ref Range  Status   09/23/2019 No growth  Final   05/02/2019 No growth  Final   05/02/2019 No growth  Final   05/02/2019 (A)  Final    Canceled, Test credited  >10 Squamous epithelial cells/low power field indicates oral contamination. Please   recollect.     05/01/2019 No growth  Final   05/01/2019 No growth  Final    Specimen Description   Date Value Ref Range Status   09/26/2019 Feces  Final   09/23/2019 Blood Left Arm  Final   09/20/2019 Feces  Final   09/20/2019 Feces  Final   09/20/2019 Feces  Final   05/02/2019 Blood Right Hand  Final   05/02/2019 Blood Right Hand  Final        Last check of C difficile  C Diff Toxin B PCR   Date Value Ref Range Status   09/20/2019 Positive (A) NEG^Negative Final     Comment:     Positive: Toxin producing C. difficile target DNA sequences detected, presumed   positive for C. difficile toxin B. C. difficile (Requires Enteric Isolation).      Clostridium difficile (Requires Enteric Isolation)  FDA approved assay performed using [employer] GeneXpert real-time PCR.           Virology:  CMV viral loads    CMV viral loads  No results found for: 27692, 61616, 22315, 39236  CMV viral loads    Recent Labs   Lab Test 10/03/19  0913 09/26/19  0424   CSPEC Plasma EDTA PLASMA   CMVLOG 2.6* 3.0*       CMV viral loads    Log IU/mL of CMVQNT   Date Value Ref Range Status   10/03/2019 2.6 (H) <2.1 [Log_IU]/mL Final   09/26/2019 3.0 (H) <2.1 [Log_IU]/mL Final   09/17/2019 3.9 (H) <2.1 [Log_IU]/mL Final   07/24/2019 3.5 (H) <2.1 [Log_IU]/mL Final   06/26/2019 <2.1 <2.1 [Log_IU]/mL Final   06/04/2019 Not Calculated <2.1 [Log_IU]/mL Final   05/24/2019 Not Calculated <2.1 [Log_IU]/mL Final   05/22/2019 Not Calculated <2.1 [Log_IU]/mL Final   05/15/2019 Not Calculated <2.1 [Log_IU]/mL Final   05/06/2019 Not Calculated <2.1 [Log_IU]/mL Final   04/30/2019 Not Calculated <2.1 [Log_IU]/mL Final   04/29/2019 Not Calculated <2.1 [Log_IU]/mL Final   04/26/2019 Not Calculated <2.1 [Log_IU]/mL Final   04/24/2019 Not  Calculated <2.1 [Log_IU]/mL Final   04/17/2019 Not Calculated <2.1 [Log_IU]/mL Final   04/08/2019 Not Calculated <2.1 [Log_IU]/mL Final   04/01/2019 Not Calculated <2.1 [Log_IU]/mL Final   03/25/2019 Not Calculated <2.1 [Log_IU]/mL Final   03/18/2019 Not Calculated <2.1 [Log_IU]/mL Final       CMV resistance testing  No lab results found.  No results found for: CMVCID, CMVFOS, CMVGAN   EBV viral loads     Recent Labs   Lab Test 09/17/19  1702 05/22/19  0926 04/29/19  0911 04/26/19  0848 03/27/19  0521   EBRES EBV DNA Not Detected 541* EBV DNA Not Detected EBV DNA Not Detected EBV DNA Not Detected     EBV DNA Copies/mL   Date Value Ref Range Status   09/17/2019 EBV DNA Not Detected EBVNEG^EBV DNA Not Detected [Copies]/mL Final   05/22/2019 541 (A) EBVNEG^EBV DNA Not Detected [Copies]/mL Final   04/29/2019 EBV DNA Not Detected EBVNEG^EBV DNA Not Detected [Copies]/mL Final   04/26/2019 EBV DNA Not Detected EBVNEG^EBV DNA Not Detected [Copies]/mL Final   03/27/2019 EBV DNA Not Detected EBVNEG^EBV DNA Not Detected [Copies]/mL Final       Human Herpes Virus 6 viral loads    No results found for: H6RES No results found for: H6SPEC    CMV Antibody IgG   Date Value Ref Range Status   02/23/2019 >8.0 (H) 0.0 - 0.8 AI Final     Comment:     Positive  Antibody index (AI) values reflect qualitative changes in antibody   concentration that cannot be directly associated with clinical condition or   disease state.     02/15/2019 >8.0 (H) 0.0 - 0.8 AI Final     Comment:     Positive  Antibody index (AI) values reflect qualitative changes in antibody   concentration that cannot be directly associated with clinical condition or   disease state.       Toxoplasma Antibody IgG   Date Value Ref Range Status   02/22/2019 <3.0 0.0 - 7.1 IU/mL Final     Comment:     Negative- Absence of detectable Toxoplasma gondii IgG antibodies. A negative   result does not rule out acute infection.  The magnitude of the measured result is not indicative  of the amount of   antibody present. The concentrations of anti-Toxoplasma gondii IgG in a given   specimen determined with assays from different manufacturers can vary due to   differences in assay methods and reagent specificity.         Imaging:  Results for orders placed or performed during the hospital encounter of 09/23/19   CT Chest Abdomen Pelvis w/o Contrast    Narrative    EXAMINATION: CT CHEST ABDOMEN PELVIS W/O CONTRAST  9/23/2019 6:27 PM      CLINICAL HISTORY: increase ab pain with n/v/d hx of c diff and cmv  infection. patient with FRANKLIN also-- no contrast.    COMPARISON: Chest CT on 5/22/2019, CT cap on 4/29/2019        PROCEDURE COMMENTS: CT of the chest, abdomen, and pelvis was performed  without intravenous and oral contrast. Axial MIP  images of the chest,  and coronal and sagittal reformatted images of the chest, abdomen, and  pelvis obtained.    FINDINGS:      Chest:  The trachea and central airways are clear.  There is no mass or  infiltration. Residual left upper lobe 4 mm nodularity (image 52  series 4), previously measured 10 x 8 mm. No significant pulmonary  nodules. No pleural effusion or pneumothorax.    Unchanged asymmetric enlarged left thyroid lobe. Postoperative changes  of heart transplant. Stable positioning of left brachiocephalic vein  stent. There is no pericardial effusion.  Normal thoracic vasculature.  No significant mediastinal, hilum or axillary lymphadenopathy.     Abdomen/pelvis:   Limited evaluation due to lack of IV contrast.    Liver: No focal mass. Unchanged multiple small hypoattenuating  lesions, too small to characterize.    Gallbladder: No radiopaque gallstones. No pericystic fluid.    Pancreas: No pancreatic mass or pancreatic duct dilatation.    Spleen: Not enlarged.    Adrenal glands: Within normal limits.    Urinary tract: Kidneys are normal in appearance. There is no evidence  for hydronephrosis or hydroureter. Urinary bladder is unremarkable.    Reproductive  organs: Within normal limits.    GI system: No abnormally dilated small bowel or colon. No definite  bowel wall thickening.    Peritoneum/fluid: No ascites     Vessels: No aneurysmal dilatation of the abdominal aorta.  The portal,  splenic, and superior mesenteric veins are patent.  The origins of the  celiac and superior mesenteric arteries are patent.    Lymph nodes: No enlarged lymphadenopathy. Numerous subcentimeter  mesenteric, retroperitoneal lymph nodes.    Bones and the soft tissues: No aggressive osseous lesions. Stable T12  compression deformity with approximately 30% vertebral body height  loss..      Impression    IMPRESSION:  1.  Continued decrease in size of left upper lung lesion with residual  nodularity, previously measured 10 x 8 mm, suggestive of  infectious/inflammatory process. No new pulmonary lesions.  2.  Limited evaluation of abdomen and pelvis due to lack of IV  contrast. Nonspecific numerous subcentimeter mesenteric and  retroperitoneal lymph nodes, likely reactive.  3.  Resolution of ascites.  4.  Blood pool density lower than myocardium, compatible with  patient's known anemia.    I have personally reviewed the examination and initial interpretation  and I agree with the findings.    LAUREN RICHARD MD   US Breast Left Limited 1-3 Quadrants    Narrative    Examination: LEFT BREAST ULTRASOUND, 9/24/2019 8:52 AM     Comparison: CT chest abdomen and pelvis 9/23/2019.    History/Family History: Left breast lump located circumferentially  around the nipple noted by patient approximately one month ago.  Swelling under left breast nipple. Evaluate for left breast abscess.    Findings: In the retroareolar left breast there is moderate  gynecomastia, as seen on CT one day prior. No fluid collection  concerning for abscess.      Impression    IMPRESSION: BI-RADS CATEGORY: 2 - Benign. Gynecomastia.    RECOMMENDED FOLLOW-UP: Clinical follow-up.      I have personally reviewed the examination and  initial interpretation  and I agree with the findings.    MADISON PEREZ MD   XR Abdomen Port 1 View    Narrative    XR ABDOMEN PORT 1 VW  9/24/2019 5:39 PM    History:  c diff.     Comparison: CT cap on 9/23/2019    Findings:   Portable supine AP abdominal radiograph. Nonobstructive bowel gas  pattern. No pneumatosis, portal venous gas. No acute osseous  abnormality. The visualized lung fields are clear.      Impression    IMPRESSION:  None obstructive bowel gas pattern without pneumatosis or portal  venous gas.    I have personally reviewed the examination and initial interpretation  and I agree with the findings.    MADISON PEREZ MD   XR Abdomen Port 1 View    Narrative    XR ABDOMEN PORT 1 VW  9/26/2019 5:05 PM    History:  C diff.     Comparison: Abdominal radiograph on 9/24/2019    Findings:   Supine portable AP abdominal radiograph. None obstructive bowel gas  pattern without pneumatosis or portal venous gas. No acute osseous  abnormality.      Impression    IMPRESSION:  Nonobstructive bowel gas pattern without pneumatosis or portal venous  gas.    I have personally reviewed the examination and initial interpretation  and I agree with the findings.    MD Ric VEGA MD

## 2019-10-15 NOTE — PROGRESS NOTES
Pt to unit 2a for right heart cath and biopsy, coronary angiogram cancelled due to pt's lack of ride.

## 2019-10-15 NOTE — IP AVS SNAPSHOT
MRN:[id number]                      After Visit Summary   10/15/2019    Everton Larios    MRN: [id number]           Visit Information        Department      10/15/2019  8:04 AM Unit [address]A [facility]          Review of your medicines      UNREVIEWED medicines. Ask your doctor about these medicines       Dose / Directions   acetaminophen 325 MG tablet  Commonly known as:  TYLENOL  Used for:  Heart replaced by transplant (H)      Dose:  650 mg  Take 2 tablets (650 mg) by mouth every 4 hours as needed for mild pain  Refills:  0     albuterol 108 (90 Base) MCG/ACT inhaler  Commonly known as:  PROAIR HFA/PROVENTIL HFA/VENTOLIN HFA  Used for:  Intermittent asthma without complication, unspecified asthma severity      Dose:  2 puff  Inhale 2 puffs into the lungs every 6 hours as needed for shortness of breath / dyspnea or wheezing  Refills:  0     amLODIPine 5 MG tablet  Commonly known as:  NORVASC  Used for:  Benign essential hypertension      TAKE 1 TABLET BY MOUTH EVERY DAY  Quantity:  30 tablet  Refills:  2     aspirin 81 MG chewable tablet  Commonly known as:  ASA  Used for:  S/P orthotopic heart transplant (H)      Dose:  81 mg  Take 1 tablet (81 mg) by mouth daily  Refills:  0     calcium carbonate 600 mg-vitamin D 400 units 600-400 MG-UNIT per tablet  Commonly known as:  CALTRATE  Used for:  Heart replaced by transplant (H)      Dose:  1 tablet  Take 1 tablet by mouth 2 times daily (with meals)  Refills:  0     cetirizine 10 MG tablet  Commonly known as:  zyrTEC      Dose:  10 mg  Take 10 mg by mouth daily  Refills:  0     DULoxetine 20 MG capsule  Commonly known as:  CYMBALTA      Dose:  20 mg  Take 20 mg by mouth daily  Refills:  0     fluticasone 220 MCG/ACT inhaler  Commonly known as:  FLOVENT HFA      Dose:  2 puff  Inhale 2 puffs into the lungs 2 times daily  Refills:  0     levothyroxine 100 MCG tablet  Commonly known as:  SYNTHROID/LEVOTHROID      Dose:  100 mcg  Take 100 mcg by mouth  daily  Refills:  0     magnesium oxide 400 MG tablet  Commonly known as:  MAG-OX  Used for:  Heart replaced by transplant (H)      Dose:  400 mg  Take 1 tablet (400 mg) by mouth 2 times daily  Quantity:  180 tablet  Refills:  3     melatonin 3 MG tablet  Used for:  Heart replaced by transplant (H)      Dose:  6 mg  Take 2 tablets (6 mg) by mouth At Bedtime  Refills:  0     mesalamine 800 MG EC tablet  Commonly known as:  ASACOL HD  Used for:  Ulcerative pancolitis (H)      Dose:  800 mg  Take 1 tablet (800 mg) by mouth 3 times daily  Quantity:  270 tablet  Refills:  1     multivitamin w/minerals tablet  Used for:  Heart replaced by transplant (H)      Dose:  1 tablet  Take 1 tablet by mouth daily  Refills:  0     mycophenolate 250 MG capsule  Commonly known as:  GENERIC EQUIVALENT  Used for:  Heart replaced by transplant (H)      Dose:  1,500 mg  Take 6 capsules (1,500 mg) by mouth 2 times daily  Quantity:  360 capsule  Refills:  11     omeprazole 40 MG DR capsule  Commonly known as:  priLOSEC  Used for:  Nausea      Dose:  40 mg  Take 1 capsule (40 mg) by mouth daily  Refills:  0     ondansetron 4 MG ODT tab  Commonly known as:  ZOFRAN-ODT      DISSOLVE 1 TABLET ON THE TONGUE EVERY EIGHT HOURS AS NEEDED  Refills:  3     polyethylene glycol powder  Commonly known as:  MIRALAX/GLYCOLAX      Dose:  1 capful  Take 1 capful by mouth daily  Refills:  0     rosuvastatin 10 MG tablet  Commonly known as:  CRESTOR  Used for:  Heart replaced by transplant (H)      Dose:  10 mg  Take 1 tablet (10 mg) by mouth daily  Quantity:  90 tablet  Refills:  1     senna 8.6 MG tablet  Commonly known as:  SENOKOT      Dose:  2 tablet  Take 2 tablets by mouth 2 times daily as needed for constipation  Refills:  0     tacrolimus 1 MG capsule  Commonly known as:  GENERIC EQUIVALENT  Used for:  Heart replaced by transplant (H)      Take 3 capsules (3 mg) in am and take 2 capsules (2 mg) in pm.  Quantity:  30 capsule  Refills:  3      valGANciclovir 450 MG tablet  Commonly known as:  VALCYTE  Indication:  Cytomegalovirus  Used for:  Cytomegalovirus infection, unspecified cytomegaloviral infection type (H)      Dose:  450 mg  Take 1 tablet (450 mg) by mouth daily  Quantity:  60 tablet  Refills:  2     vancomycin 50 MG/ML oral solution  Commonly known as:  FIRVANQ  Indication:  Clostridium difficile Bacteria  Used for:  C. difficile diarrhea  Ask about: Should I take this medication?      Dose:  125 mg  Take 2.5 mLs (125 mg) by mouth 4 times daily for 9 days  Quantity:  90 mL  Refills:  0              Protect others around you: Learn how to safely use, store and throw away your medicines at www.disposemymeds.org.       Follow-ups after your visit       Your next 10 appointments already scheduled    Oct 15, 2019  Procedure with Santino Mckeon MD  [facility], [facility],  Heart Cath Lab ([facility], [facility]) [address] [place] [id number]  [phone]   The [facility] is located on the corner of [facility] and [facility] on the [facility]. It is easily accessible from virtually any point in the [facility] area, via [employer] and [employer].   Oct 15, 2019  2:30 PM CDT  (Arrive by 2:15 PM)  Return Visit with Ric Shukla MD  [facility] ([facility]) [address] [place] [address]  [address]  [facility] [address] [phone]      Oct 16, 2019  8:00 AM CDT  (Arrive by 7:45 AM)  RETURN HEART TRANSPLANT with Corinna Donis MD  [facility] ([facility]) [address] [place] [address]  [address]  [facility] [address] [phone]      Oct 22, 2019  6:00 PM CDT  (Arrive by 5:45 PM)  NEW ENDOCRINE with Agata Weathers MD  [facility] ([facility]) [address] [place] [address]  3rd Floor  [facility] [address]  [phone]      Nov 26, 2019  3:40 PM  CST  (Arrive by 3:25 PM)  Return Visit with Jocelynn Jensen MD  [facility] ([facility]) [address] [facility]  [address] [facility] [address] [phone]         Care Instructions       Further instructions from your care team       [facility]                        Interventional Cardiology  Discharge Instructions   Post Right Heart Cath and Heart Biopsy    AFTER YOU GO HOME:    DO drink plenty of fluids    DO resume your regular diet and medications unless otherwise instructed by your Primary Physician    Do Not scrub the procedure site vigorously    No lotion or powder to the puncture site for 3 days    CALL YOUR PRIMARY PHYSICIAN IF: You may resume all normal activity.  Monitor neck site for bleeding, swelling, or voice changes. If you notice bleeding or swelling immediately apply pressure to the site and call number below to speak with Cardiology Fellow.  If you experience any changes in your breathing you should call your doctor immediately or come to the closest Emergency Department.  Do not drive yourself.    ADDITIONAL INSTRUCTIONS: Medications: You are to resume all home medications including anticoagulation therapy unless otherwise advised by your primary cardiologist or nurse coordinator.    Follow Up: Per your primary cardiology team    If you have any questions or concerns regarding your procedure site please call [phone] at anytime and ask for Cardiology Fellow on call.  They are available 24 hours a day.  You may also contact the Cardiology Clinic after hours number at [phone].                                                       Telephone Numbers [phone] Monday-Friday 8:00 am to 4:30 pm    [phone] After 4:30 pm Monday-Friday, Weekends & Holidays  Ask for Interventional Cardiologist on call. Someone is on call 24 hours/day   [facility] toll free number [phone] Monday-Friday 8:00  am to 4:30 pm   [facility] Emergency Dept [phone]                   Additional Information About Your Visit       [employer] Information    [employer] gives you secure access to your electronic health record. If you see a primary care provider, you can also send messages to your care team and make appointments. If you have questions, please call your primary care clinic.  If you do not have a primary care provider, please call [phone] and they will assist you.       Care EveryWhere ID    This is your Care EveryWhere ID. This could be used by other organizations to access your [facility] medical records  [id number]        Primary Care Provider Office Phone # Fax #    Fredrick J New [phone]      Equal Access to Services    BELIA GOLDBERG : Merari Morton, ignacio morton, qalisy kaalmachen fuentes, dean topete. So [facility] [phone].    ATENCIÓN: Si habla español, tiene a anaya disposición servicios gratuitos de asistencia lingüística. Llame al [phone].    We comply with applicable federal civil rights laws and [place] laws. We do not discriminate on the basis of race, color, national origin, age, disability, sex, sexual orientation, or gender identity.           Thank you!    Thank you for choosing [facility] for your care. Our goal is always to provide you with excellent care. Hearing back from our patients is one way we can continue to improve our services. Please take a few minutes to complete the written survey that you may receive in the mail after you visit with us. Thank you!            Medication List      ASK your doctor about these medications          Morning Afternoon Evening Bedtime As Needed    acetaminophen 325 MG tablet  Also known as:  TYLENOL  INSTRUCTIONS:  Take 2 tablets (650 mg) by mouth every 4 hours as needed for mild pain                     albuterol 108 (90 Base) MCG/ACT inhaler  Also known as:  PROAIR HFA/PROVENTIL HFA/VENTOLIN  HFA  INSTRUCTIONS:  Inhale 2 puffs into the lungs every 6 hours as needed for shortness of breath / dyspnea or wheezing                     amLODIPine 5 MG tablet  Also known as:  NORVASC  INSTRUCTIONS:  TAKE 1 TABLET BY MOUTH EVERY DAY                     aspirin 81 MG chewable tablet  Also known as:  ASA  INSTRUCTIONS:  Take 1 tablet (81 mg) by mouth daily                     calcium carbonate 600 mg-vitamin D 400 units 600-400 MG-UNIT per tablet  Also known as:  CALTRATE  INSTRUCTIONS:  Take 1 tablet by mouth 2 times daily (with meals)                     cetirizine 10 MG tablet  Also known as:  zyrTEC  INSTRUCTIONS:  Take 10 mg by mouth daily                     DULoxetine 20 MG capsule  Also known as:  CYMBALTA  INSTRUCTIONS:  Take 20 mg by mouth daily                     fluticasone 220 MCG/ACT inhaler  Also known as:  FLOVENT HFA  INSTRUCTIONS:  Inhale 2 puffs into the lungs 2 times daily                     levothyroxine 100 MCG tablet  Also known as:  SYNTHROID/LEVOTHROID  INSTRUCTIONS:  Take 100 mcg by mouth daily                     magnesium oxide 400 MG tablet  Also known as:  MAG-OX  INSTRUCTIONS:  Take 1 tablet (400 mg) by mouth 2 times daily  Doctor's comments:  Increase in dose                     melatonin 3 MG tablet  INSTRUCTIONS:  Take 2 tablets (6 mg) by mouth At Bedtime                     mesalamine 800 MG EC tablet  Also known as:  ASACOL HD  INSTRUCTIONS:  Take 1 tablet (800 mg) by mouth 3 times daily                     multivitamin w/minerals tablet  INSTRUCTIONS:  Take 1 tablet by mouth daily                     mycophenolate 250 MG capsule  Also known as:  GENERIC EQUIVALENT  INSTRUCTIONS:  Take 6 capsules (1,500 mg) by mouth 2 times daily                     omeprazole 40 MG DR capsule  Also known as:  priLOSEC  INSTRUCTIONS:  Take 1 capsule (40 mg) by mouth daily                     ondansetron 4 MG ODT tab  Also known as:  ZOFRAN-ODT  INSTRUCTIONS:  DISSOLVE 1 TABLET ON THE TONGUE  EVERY EIGHT HOURS AS NEEDED                     polyethylene glycol powder  Also known as:  MIRALAX/GLYCOLAX  INSTRUCTIONS:  Take 1 capful by mouth daily                     rosuvastatin 10 MG tablet  Also known as:  CRESTOR  INSTRUCTIONS:  Take 1 tablet (10 mg) by mouth daily                     senna 8.6 MG tablet  Also known as:  SENOKOT  INSTRUCTIONS:  Take 2 tablets by mouth 2 times daily as needed for constipation                     tacrolimus 1 MG capsule  Also known as:  GENERIC EQUIVALENT  INSTRUCTIONS:  Take 3 capsules (3 mg) in am and take 2 capsules (2 mg) in pm.  Doctor's comments:  Transplant Date: 2/24/2019 (Heart) Discharge Date: 4/3/2019 ICD10: Heart replaced by transplant -Z94.4 Location of Transplant: [facility] ([place], [place])                     valGANciclovir 450 MG tablet  Also known as:  VALCYTE  INSTRUCTIONS:  Take 1 tablet (450 mg) by mouth daily  Reason for med:  Cytomegalovirus                     vancomycin 50 MG/ML oral solution  Also known as:  FIRVANQ  INSTRUCTIONS:  Take 2.5 mLs (125 mg) by mouth 4 times daily for 9 days  Reason for med:  Clostridium difficile Bacteria  Ask about: Should I take this medication?

## 2019-10-15 NOTE — IP AVS SNAPSHOT
[address] [address]                                    After Visit Summary   10/15/2019    Everton Larios    MRN: [id number]           After Visit Summary Signature Page    I have received my discharge instructions, and my questions have been answered. I have discussed any challenges I see with this plan with the nurse or doctor.    ..........................................................................................................................................  Patient/Patient Representative Signature      ..........................................................................................................................................  Patient Representative Print Name and Relationship to Patient    ..................................................               ................................................  Date                                   Time    ..........................................................................................................................................  Reviewed by Signature/Title    ...................................................              ..............................................  Date                                               Time          22EPIC Rev 08/18

## 2019-10-15 NOTE — PROGRESS NOTES
1205  Returned to  from [facility].  Site at [facility] is FDI.  Denies any pain.  1215  Nutrition of turkey sand,  Pudding, & water taken well.  Discharge instructions have already been reviewed with pt.  1230  Discharged to care of self per ambulation.  No IV sedation was given for this procedure.

## 2019-10-15 NOTE — NURSING NOTE
"Chief Complaint   Patient presents with     RECHECK     hospital follow up     Blood pressure 137/80, pulse 120, temperature 98.5  F (36.9  C), temperature source Oral, height 1.727 m (5' 7.99\"), weight 61 kg (134 lb 8 oz), SpO2 100 %.    Patient stated he has had flu vaccine for this year.   Daniella Ch on 10/15/2019 at 2:10 PM    "

## 2019-10-15 NOTE — LETTER
10/15/2019       RE: Everton Larios  [address] [facility] [id number]     Dear Colleague,    Thank you for referring your patient, Everton Larios, to the [facility] AND INFECTIOUS DISEASES at [facility]. Please see a copy of my visit note below.    [facility]    Transplant Infectious Diseases Outpatient Progress note      Patient:  Everton Larios, Date of birth 1965, Medical record number [id number]  Date of Visit:  10/15/2019         Recommendations:   1. Ideally we should increase the dose of VGCV to 450 mg bid or 900 mg daily, however, the patient is having significant financial strain and not able to afford it.   I will continue VGCV 450 mg daily till CMV PCR 10/15/2019 is resulted.   2. Good candidate for shingrix but he's waiting for it to be covered by his insurance.    3. Continue CMV weekly.     RTC: 1 month.         Summary of Presentation:   Transplants:  2/24/2019 (Heart), Postoperative day:  233     This patient is a 54 year old male with congenital heart disease s/p OHT in 2/2019 with methylprednisolone induction and TAC/MMF for maintenance.   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         Active Problems and Infectious Diseases Issues:   1. CMV viremia.   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   In 7/2019 CMV was 3.5 log. It was repeated 9/17/2019 and resulted at 3.9 log. This very slow replication rate is probably due to CMV +/+.   Now CMV VL is doen to 2.6 log as of 10/3/2019. However this with the need to keep adjusting the dose of VGCV due to renal failure.     Ideally, the patient should be switched to VGCV PO bid due to improving kidney function of 44.   The patient is having a  significant financial strain, and is not really able to afford the VGCV.  Given that the [employer] system is down and he's not able to fill the prescription today anyway, I will wait for the results of CMV PCR 10/15/2019 and hopefully I can move to maintenance therapy rather than induction therapy.        The diarrhea and abdominal pain have both resolved either due to CMV therapy or CDI therapy or both.     He met with our pharmacist to assess eligibility for any coupons/VGCV assistant programs.     2. FRANKLIN on CRF.   Improving.   Will continue to adjust the VGCV dose accordingly.         Old Problems and Infectious Diseases Issues:   1. Drug induced neutropenia resolved after stopping bactrim and VGCV and with couple of doses of G-CSF.  2. P aeruginosa BSI 4/29/2019 with P aeruginosa HCAP and pulmonary nodules in the setting of febrile neutropenia. We treated him for severe pseudomonal pneumonia with 4 week course of ABx till 5/28/2019. He was also treated with IV micafungin till 5/28/2019 due to pulmonary nodules and high risk for aspergillus given positive BD glucan. CT scan continues to improve while off of antimicrobilals as evidenced on a CT done this admission on 9/24/2019 while off of ABx and antifungals.    3. QTc prolongation. Prevented the use of azole and fluoroquinolones.   4. CDI 9/2019 treated with 2 weeks of PO vancomycin.       Other Infectious Disease issues include:  - QTc 532 9/23/2019.   - PCP prophylaxis: off of bactrim since 4/26/2019. Was given pentamidine till 8/2019.   - Serostatus: CMV D+/R+, EBV D+/R+, HSV1+/2-, VZV +               currently on VGCV for induction therapy.   - Immunization status: good candidate for shingrix but he's waiting for it to be covered by his insurance.    - Gamma globulin status: 358 5/1/2019.       Attestation:  I interviewed the patient and obtained history from the patient, and by reviewing the patient's chart including outside records, microbiological data,  and radiological data. All data are summarized in this notes.  Ric Shukla MD   Pager: [phone]  10/15/2019         Interim History:   Diarrhea and abdominal pain resolved.   He is very frustrated with the medical bills and now wondering if the OHT was actually worth it because he can't even pay his bills anymore.         HPI:       Transplants:  2/24/2019 (Heart); Postoperative day:  233    This patient is a 54 year old male with congenital heart disease s/p OHT in 2/2019 with methylprednisolone induction and TAC/MMF/prednisone for maintenance.      The course was complicated by neutropenia late 4/2019. He was then admitted with fever, neutropenia dyspnea, and cough.   Was found to have multiple pulmonary nodules with GGO on chest CT. Blood cx on 4/29/2019 and BAL on 4/30/2019 grew P aeruginosa.   Treated with cefepime IV for 4 weeks.   Due to multiple risk factors for aspergillosis including neutropenia and positive BD glucan, he was also empirically treated with micafunin IV starting 5/6/2019.   We could not use azoles and fluoroquinolones due to prolonged QTc at baseline. Costs prohibited the use of isavuconazole.      Neutropenia resolved after the 2 doses of G-CSF on 5/1/2019 and 5/2/2019. Also after the discontinuation of VGCV and bactrim and decreasing the dose of MMF.       Due to neutropenia and inability to use VGCV, ID recommended weekly CMV PCR in the setting of increasing MMF.   In July of 2019 his CMV VL was 3.5 log, it was repeated in 9/2019 and was 3.9 log. He was started on VGCV adjusted to kidney function on 9/20/2019.  He was also complaining of diarrhea and abdominal pain. I thought the abdominal pain was due to CMV. He was evaluated the same day by GI due to hx of UC. His C diff test was positive and was treated with 14 days of vancomycin PO.               Past Medical History:     Past Medical History:   Diagnosis Date     Alcohol abuse      Pierce's esophagus 10/4/2018     Chronic  rhinitis 10/4/2018     Chronic systolic heart failure (H) 10/4/2018     Depression 10/4/2018     Diastolic dysfunction      DJD (degenerative joint disease) - neck 10/4/2018     H/O congenital atrial septal defect (ASD) repair at age 3 10/4/2018     Hypothyroidism due to medication 10/4/2018     ICD, [place] [employer] 2008; gen change 2/2018 10/4/2018     Intermittent asthma without complication 10/4/2018     Nonischemic cardiomyopathy (H) 10/4/2018     On amiodarone therapy 10/4/2018     Paroxysmal atrial fibrillation (H) 10/4/2018     S/P ablation of atrial fibrillation 10/4/2018     Systolic heart failure (H)      Ulcerative colitis (H) 10/4/2018     Ventricular tachycardia (H) 10/4/2018             Past Surgical History:     Past Surgical History:   Procedure Laterality Date     AICD, DUAL CHAMBER       BRONCHOSCOPY (RIGID OR FLEXIBLE), DIAGNOSTIC N/A 4/30/2019    Procedure: BRONCHOSCOPY, WITH BAL;  Surgeon: Margot Chiang MD;  Location:  GI     CV HEART BIOPSY N/A 3/4/2019    Procedure: Heart Biopsy;  Surgeon: Abhijeet Titus MD;  Location:  HEART CARDIAC CATH LAB     CV HEART BIOPSY N/A 3/25/2019    Procedure: Heart Biopsy;  Surgeon: Yves Rodrigues MD;  Location:  HEART CARDIAC CATH LAB     CV HEART BIOPSY N/A 3/28/2019    Procedure: Heart Cath Heart Biopsy;  Surgeon: Yves Rodrigues MD;  Location:  HEART CARDIAC CATH LAB     CV HEART BIOPSY N/A 4/10/2019    Procedure: CV HEART BIOPSY;  Surgeon: Isiah Mcallister MD;  Location:  HEART CARDIAC CATH LAB     CV HEART BIOPSY N/A 4/24/2019    Procedure: CV HEART BIOPSY;  Surgeon: Isiah Mcallister MD;  Location: [facility] CARDIAC CATH LAB     CV HEART BIOPSY N/A 5/8/2019    Procedure: CV HEART BIOPSY;  Surgeon: Isiah Mcallister MD;  Location:  HEART CARDIAC CATH LAB     CV HEART BIOPSY N/A 6/4/2019    Procedure: CV HEART BIOPSY;  Surgeon: Alton Solomon MD;  Location:  HEART CARDIAC CATH LAB     CV HEART BIOPSY N/A  5/22/2019    Procedure: CV HEART BIOPSY;  Surgeon: Yves Rodrigues MD;  Location: [facility] CARDIAC CATH LAB     CV HEART BIOPSY N/A 7/17/2019    Procedure: CV HEART BIOPSY;  Surgeon: Isiah Macllister MD;  Location: [facility] HEART CARDIAC CATH LAB     CV HEART BIOPSY N/A 8/13/2019    Procedure: CV HEART BIOPSY;  Surgeon: Jackson Patten MD;  Location: [facility] HEART CARDIAC CATH LAB     CV HEART BIOPSY N/A 10/15/2019    Procedure: CV HEART BIOPSY;  Surgeon: Santino Mckeon MD;  Location:  HEART CARDIAC CATH LAB     CV INTRA-AORTIC BALLOON PUMP INSERTION N/A 2/18/2019    Procedure: Intra-Aortic Balloon;  Surgeon: Alton Solomon MD;  Location:  HEART CARDIAC CATH LAB     CV INTRA-AORTIC BALLOON PUMP INSERTION N/A 2/20/2019    Procedure: Replace subclavian IABP;  Surgeon: Yves Rodrigues MD;  Location: [facility] HE[facility] CARDIAC CATH LAB     CV LEFT HEART CATH N/A 3/19/2019    Procedure: Left Heart Cath;  Surgeon: Yves Rodrigues MD;  Location: [facility] HE[facility] CARDIAC CATH LAB     CV RIGHT HEART CATH N/A 3/19/2019    Procedure: RHC/HBx - Femoral access for 3/19 per Nimisha GONZALEZ;  Surgeon: Yves Rodrigues MD;  Location:  HEART CARDIAC CATH LAB     CV RIGHT HEART CATH N/A 3/25/2019    Procedure: Right Heart Cath;  Surgeon: Yves Rodrigues MD;  Location: [facility] CARDIAC CATH LAB     CV RIGHT HEART CATH N/A 3/28/2019    Procedure: Heart Cath Right Heart Cath;  Surgeon: Yves Rodrigues MD;  Location: [facility] HE[facility] CARDIAC CATH LAB     CV RIGHT HEART CATH N/A 4/24/2019    Procedure: CV RIGHT HEART CATH;  Surgeon: Isiah Mcallister MD;  Location:  HEART CARDIAC CATH LAB     CV RIGHT HEART CATH N/A 3/11/2019    Procedure: ADD ON RHC/HBX;  Surgeon: Alton Solomon MD;  Location:  HEART CARDIAC CATH LAB     CV RIGHT HEART CATH N/A 6/4/2019    Procedure: CV RIGHT HEART CATH;  Surgeon: Alton Solomon MD;  Location:  HEART CARDIAC CATH LAB     CV RIGHT HEART CATH N/A 5/22/2019    Procedure:  CV RIGHT HEART CATH;  Surgeon: Yves Rodrigues MD;  Location:  HEART CARDIAC CATH LAB     CV RIGHT HEART CATH N/A 2019    Procedure: CV RIGHT HEART CATH;  Surgeon: Jackson Patten MD;  Location:  HEART CARDIAC CATH LAB     CV RIGHT HEART CATH N/A 10/15/2019    Procedure: CV RIGHT HEART CATH;  Surgeon: Santino Mckeon MD;  Location:  HEART CARDIAC CATH LAB     INCISION AND DRAINAGE CHEST WASHOUT, COMBINED N/A 3/21/2019    Procedure: Incision And Drainage; Evacuation Right Chest Wall Hematoma;  Surgeon: Dewayne House MD;  [facility]     INSERT INTRAAORTIC BALLOON PUMP Left 2019    Procedure: Insert left  Subclavian Balloon Pump,  Removal Right femoral arterial balloom pump sheath;  Surgeon: Dewayne House MD;  [facility]     INSERT INTRAAORTIC BALLOON PUMP Left 2019    Procedure: SUBCLAVIAN BALLOON PUMP PLACEMENT;  Surgeon: Ben White MD;  [facility]     IR PICC PLACEMENT > 5 YRS OF AGE  2019     TRANSPLANT HEART RECIPIENT N/A 2019    Procedure: TRANSPLANT HEART RECIPIENT;  Surgeon: Dewayne House MD;  [facility]               Social History:     Social History     Tobacco Use     Smoking status: Former Smoker     Packs/day: 0.00     Years: 10.00     Pack years: 0.00     Start date: 1980     Last attempt to quit: 2008     Years since quittin.7     Smokeless tobacco: Never Used   Substance Use Topics     Alcohol use: Not Currently     Alcohol/week: 1.0 standard drinks     Types: 1 Cans of beer per week             Family History:     Family History   Problem Relation Age of Onset     Endometrial Cancer Mother      Hypertension Father      Endometrial Cancer Maternal Grandmother              Immunizations:     Immunization History   Administered Date(s) Administered     Influenza (IIV3) PF 10/22/2008, 2009, 2011, 2012, 10/27/2013, 10/02/2014     Influenza Vaccine IM > 6 months Valent IIV4  "10/02/2015     Influenza Vaccine, 3 YRS +, IM (QUADRIVALENT W/PRESERVATIVES) 09/12/2016, 10/16/2017, 09/20/2018     OPV, trivalent, live 10/20/1978     Pneumo Conj 13-V (2010&after) 09/26/2018     Pneumococcal 23 valent 09/28/2019     TDAP Vaccine (Boostrix) 07/20/2012     Td (Adult), Adsorbed 10/20/1978             Allergies:     Allergies   Allergen Reactions     Hydromorphone Other (See Comments)     Significant Delirium     Adhesive Tape Blisters     Tegaderm causes bruises, blisters and extreme irritation.     Lisinopril Other (See Comments)     hypotension     Quinolones Other (See Comments)     Would not rx quinolones until QTc interval improved.      Tobramycin Other (See Comments)     Would not use aminoglycosides as his kidney function is very borderline     Codeine Nausea             Medications:     No current outpatient medications on file.     No current facility-administered medications for this visit.             Review of Systems:   As mentioned in the interim history otherwise negative by reviewing constitutional symptoms, central and peripheral neurological systems, respiratory system, cardiac system, GI system,  system, musculoskeletal, skin, allergy, and lymphatics.                  Physical Exam:   /80 (BP Location: Right arm, Patient Position: Sitting, Cuff Size: Adult Regular)   Pulse 120   Temp 98.5  F (36.9  C) (Oral)   Ht 1.727 m (5' 7.99\")   Wt 61 kg (134 lb 8 oz)   SpO2 100%   BMI 20.46 kg/m       Constitutional: awake, alert, cooperative, no apparent distress and appears at stated age, well nourished.   Neurologic: Patient is moving all extremities without focal deficit, no focal sensory loss.   Extremities: no pitting edema of bilateral lower extremities, no ulcers, normal ROM of all joints, no swelling or erythema of any of joints and no tenderness to palpation.            Laboratory Data:     No results found for: ACD4    Inflammatory Markers    Recent Labs   Lab Test " 09/24/19  0435 09/23/19  1534 09/17/19  1702 05/15/19  0940 05/02/19  0536 04/29/19  0911 11/15/18  0955 10/22/18  1239   SED  --  17 18  --  37*  --   --   --    CRP <2.9 3.8 <2.9 <2.9 69.0* 120.0* 6.1 12.5       Immune Globulin Studies      Recent Labs   Lab Test 09/17/19  1702 05/01/19  1850   IGG  --  358*   IGM  --  29*   IGE  --  38    106       Metabolic Studies    Recent Labs   Lab Test 10/15/19  0835 10/03/19  0913 09/28/19  0537 09/27/19  0446 09/26/19  1127 09/25/19  0507  09/23/19  1534  07/17/19  1030  06/17/19  0926  05/22/19  0926  04/29/19  0918  02/23/19  1835    137 137 135 136 138   < > 134   < > 135   < > 137   < > 138   < >  --    < > 133   POTASSIUM 4.6 4.3 3.7 3.9 4.0 4.0   < > 4.6   < > 4.2   < > 4.1   < > 3.7   < >  --    < > 3.6   CHLORIDE 107 110* 111* 111* 110* 116*   < > 108   < > 103   < > 104   < > 102   < >  --    < > 93*   CO2 24 18* 14* 12* 15* 14*   < > 15*   < > 24   < > 20   < > 25   < >  --    < > 30   ANIONGAP 8 9 12 11 11 8   < > 11   < > 8   < > 13   < > 10   < >  --    < > 10   BUN 31* 29 19 18 17 26   < > 47*   < > 17   < > 27   < > 53*   < >  --    < > 28   CR 1.20 1.61* 1.51* 1.49* 1.56* 1.61*   < > 2.41*   < > 1.07   < > 1.39*   < > 2.43*   < >  --    < > 1.03   GFRESTIMATED 67 47* 51* 52* 49* 47*   < > 29*   < > 77   < > 56*   < > 29*   < >  --    < > 81   GLC 82 78 75 74 87 90   < > 96   < > 80   < > 80   < > 82   < >  --    < > 114*   A1C  --   --   --   --   --   --   --   --   --   --   --   --   --   --   --   --   --  5.7*   RADAMES 9.1 8.7 8.7 8.3* 8.6 8.5   < > 9.0   < > 8.9   < > 8.8   < > 9.3   < >  --    < > 8.9   PHOS 4.2  --   --   --   --   --   --  4.8*  --  4.0  --  3.8   < > 4.2   < >  --    < > 3.6   MAG 1.8  --   --   --   --   --   --  2.0  --  1.7  --  1.4*   < > 1.9   < >  --    < > 2.0   LACT  --   --   --   --   --   --   --  1.2  --   --   --   --   --   --   --  1.7   < >  --    CKT 56  --   --   --   --   --   --   --   --  34  --  46   --  26*  --   --   --   --     < > = values in this interval not displayed.       Hepatic Studies    Recent Labs   Lab Test 10/15/19  0835 09/28/19  0537 09/27/19  0446 09/26/19  1127 09/25/19  0507 09/24/19  0435  03/14/19  1653  03/10/19  0410   BILITOTAL 0.4 0.5 0.4 0.4 0.3 0.4   < >  --    < > 0.8   ALKPHOS 230* 221* 233* 272* 237* 218*   < >  --    < > 104   PROTTOTAL 7.0 6.0* 5.9* 6.7* 5.7* 5.4*   < >  --    < > 5.0*   ALBUMIN 3.6 3.2* 3.0* 3.4 3.1* 2.9*   < >  --    < > 2.8*   AST 15 14 10 15 11 11   < >  --    < > 22   ALT 29 14 14 17 15 14   < >  --    < > 19   LDH  --   --   --   --   --   --   --  320*  --  276*    < > = values in this interval not displayed.       Hematology Studies     Recent Labs   Lab Test 10/15/19  0835 10/03/19  0913 09/28/19  0537 09/27/19  0446 09/26/19  1127 09/26/19  0424 09/25/19  0507 09/24/19  0437  09/23/19  1534 09/17/19  1702 09/17/19  0856   WBC 2.7* 2.0* 3.5* 4.0 4.2  --  3.5* 3.6*  --  4.6 3.7* 3.5*   ANEU  --   --  2.7 2.9  --   --   --  1.9  --  2.9 2.0 1.9   ALYM  --   --  0.5* 0.9  --   --   --  0.8  --  1.0 1.1 0.9   GRACE  --   --  0.1 0.1  --   --   --  0.4  --  0.4 0.4 0.5   AEOS  --   --  0.2 0.1  --   --   --  0.3  --  0.3 0.3 0.3   HGB 8.5* 8.5* 8.0* 7.7* 9.0*  --  8.0* 7.6*  --  10.2* 10.1* 9.5*   HCT 28.7* 25.6* 24.8* 24.2* 28.8*  --  25.7* 24.2*  --  32.7* 32.8* 28.5*    386 247 231 243 247 228 234   < > 290 284 279    < > = values in this interval not displayed.       Clotting Studies    Recent Labs   Lab Test 09/23/19  1534 05/08/19  0504 05/07/19  0451 05/06/19  0438  03/14/19  1653  02/27/19  0421 02/25/19  1502   INR 1.17* 1.22* 1.33* 1.31*   < > 1.36*   < >  --  1.29*   PTT 35  --   --   --   --  46*  --  38* 32    < > = values in this interval not displayed.       Urine Studies    Recent Labs   Lab Test 05/26/19  1006 04/29/19  1031 03/26/19  1707 03/17/19  0045 03/04/19  1303   URINEPH 6.5 6.0 5.5 5.5 5.0   NITRITE Negative Negative Negative  Negative Negative   LEUKEST Negative Negative Negative Negative Negative   WBCU 0 <1 2 15* 0         Microbiology:  Last 6 Culture results with specimen source  Culture Micro   Date Value Ref Range Status   09/23/2019 No growth  Final   05/02/2019 No growth  Final   05/02/2019 No growth  Final   05/02/2019 (A)  Final    Canceled, Test credited  >10 Squamous epithelial cells/low power field indicates oral contamination. Please   recollect.     05/01/2019 No growth  Final   05/01/2019 No growth  Final    Specimen Description   Date Value Ref Range Status   09/26/2019 Feces  Final   09/23/2019 Blood Left Arm  Final   09/20/2019 Feces  Final   09/20/2019 Feces  Final   09/20/2019 Feces  Final   05/02/2019 Blood Right Hand  Final   05/02/2019 Blood Right Hand  Final        Last check of C difficile  C Diff Toxin B PCR   Date Value Ref Range Status   09/20/2019 Positive (A) NEG^Negative Final     Comment:     Positive: Toxin producing C. difficile target DNA sequences detected, presumed   positive for C. difficile toxin B. C. difficile (Requires Enteric Isolation).      Clostridium difficile (Requires Enteric Isolation)  FDA approved assay performed using [employer] GeneXpert real-time PCR.           Virology:  CMV viral loads    CMV viral loads  No results found for: 99079, 30837, 71749, 43049  CMV viral loads    Recent Labs   Lab Test 10/03/19  0913 09/26/19  0424   CSPEC Plasma EDTA PLASMA   CMVLOG 2.6* 3.0*       CMV viral loads    Log IU/mL of CMVQNT   Date Value Ref Range Status   10/03/2019 2.6 (H) <2.1 [Log_IU]/mL Final   09/26/2019 3.0 (H) <2.1 [Log_IU]/mL Final   09/17/2019 3.9 (H) <2.1 [Log_IU]/mL Final   07/24/2019 3.5 (H) <2.1 [Log_IU]/mL Final   06/26/2019 <2.1 <2.1 [Log_IU]/mL Final   06/04/2019 Not Calculated <2.1 [Log_IU]/mL Final   05/24/2019 Not Calculated <2.1 [Log_IU]/mL Final   05/22/2019 Not Calculated <2.1 [Log_IU]/mL Final   05/15/2019 Not Calculated <2.1 [Log_IU]/mL Final   05/06/2019 Not Calculated  <2.1 [Log_IU]/mL Final   04/30/2019 Not Calculated <2.1 [Log_IU]/mL Final   04/29/2019 Not Calculated <2.1 [Log_IU]/mL Final   04/26/2019 Not Calculated <2.1 [Log_IU]/mL Final   04/24/2019 Not Calculated <2.1 [Log_IU]/mL Final   04/17/2019 Not Calculated <2.1 [Log_IU]/mL Final   04/08/2019 Not Calculated <2.1 [Log_IU]/mL Final   04/01/2019 Not Calculated <2.1 [Log_IU]/mL Final   03/25/2019 Not Calculated <2.1 [Log_IU]/mL Final   03/18/2019 Not Calculated <2.1 [Log_IU]/mL Final       CMV resistance testing  No lab results found.  No results found for: CMVCID, CMVFOS, CMVGAN   EBV viral loads     Recent Labs   Lab Test 09/17/19  1702 05/22/19  0926 04/29/19  0911 04/26/19  0848 03/27/19  0521   EBRES EBV DNA Not Detected 541* EBV DNA Not Detected EBV DNA Not Detected EBV DNA Not Detected     EBV DNA Copies/mL   Date Value Ref Range Status   09/17/2019 EBV DNA Not Detected EBVNEG^EBV DNA Not Detected [Copies]/mL Final   05/22/2019 541 (A) EBVNEG^EBV DNA Not Detected [Copies]/mL Final   04/29/2019 EBV DNA Not Detected EBVNEG^EBV DNA Not Detected [Copies]/mL Final   04/26/2019 EBV DNA Not Detected EBVNEG^EBV DNA Not Detected [Copies]/mL Final   03/27/2019 EBV DNA Not Detected EBVNEG^EBV DNA Not Detected [Copies]/mL Final       Human Herpes Virus 6 viral loads    No results found for: H6RES No results found for: H6SPEC    CMV Antibody IgG   Date Value Ref Range Status   02/23/2019 >8.0 (H) 0.0 - 0.8 AI Final     Comment:     Positive  Antibody index (AI) values reflect qualitative changes in antibody   concentration that cannot be directly associated with clinical condition or   disease state.     02/15/2019 >8.0 (H) 0.0 - 0.8 AI Final     Comment:     Positive  Antibody index (AI) values reflect qualitative changes in antibody   concentration that cannot be directly associated with clinical condition or   disease state.       Toxoplasma Antibody IgG   Date Value Ref Range Status   02/22/2019 <3.0 0.0 - 7.1 IU/mL Final      Comment:     Negative- Absence of detectable Toxoplasma gondii IgG antibodies. A negative   result does not rule out acute infection.  The magnitude of the measured result is not indicative of the amount of   antibody present. The concentrations of anti-Toxoplasma gondii IgG in a given   specimen determined with assays from different manufacturers can vary due to   differences in assay methods and reagent specificity.         Imaging:  Results for orders placed or performed during the hospital encounter of 09/23/19   CT Chest Abdomen Pelvis w/o Contrast    Narrative    EXAMINATION: CT CHEST ABDOMEN PELVIS W/O CONTRAST  9/23/2019 6:27 PM      CLINICAL HISTORY: increase ab pain with n/v/d hx of c diff and cmv  infection. patient with FRANKLIN also-- no contrast.    COMPARISON: Chest CT on 5/22/2019, CT cap on 4/29/2019        PROCEDURE COMMENTS: CT of the chest, abdomen, and pelvis was performed  without intravenous and oral contrast. Axial MIP  images of the chest,  and coronal and sagittal reformatted images of the chest, abdomen, and  pelvis obtained.    FINDINGS:      Chest:  The trachea and central airways are clear.  There is no mass or  infiltration. Residual left upper lobe 4 mm nodularity (image 52  series 4), previously measured 10 x 8 mm. No significant pulmonary  nodules. No pleural effusion or pneumothorax.    Unchanged asymmetric enlarged left thyroid lobe. Postoperative changes  of heart transplant. Stable positioning of left brachiocephalic vein  stent. There is no pericardial effusion.  Normal thoracic vasculature.  No significant mediastinal, hilum or axillary lymphadenopathy.     Abdomen/pelvis:   Limited evaluation due to lack of IV contrast.    Liver: No focal mass. Unchanged multiple small hypoattenuating  lesions, too small to characterize.    Gallbladder: No radiopaque gallstones. No pericystic fluid.    Pancreas: No pancreatic mass or pancreatic duct dilatation.    Spleen: Not enlarged.    Adrenal  glands: Within normal limits.    Urinary tract: Kidneys are normal in appearance. There is no evidence  for hydronephrosis or hydroureter. Urinary bladder is unremarkable.    Reproductive organs: Within normal limits.    GI system: No abnormally dilated small bowel or colon. No definite  bowel wall thickening.    Peritoneum/fluid: No ascites     Vessels: No aneurysmal dilatation of the abdominal aorta.  The portal,  splenic, and superior mesenteric veins are patent.  The origins of the  celiac and superior mesenteric arteries are patent.    Lymph nodes: No enlarged lymphadenopathy. Numerous subcentimeter  mesenteric, retroperitoneal lymph nodes.    Bones and the soft tissues: No aggressive osseous lesions. Stable T12  compression deformity with approximately 30% vertebral body height  loss..      Impression    IMPRESSION:  1.  Continued decrease in size of left upper lung lesion with residual  nodularity, previously measured 10 x 8 mm, suggestive of  infectious/inflammatory process. No new pulmonary lesions.  2.  Limited evaluation of abdomen and pelvis due to lack of IV  contrast. Nonspecific numerous subcentimeter mesenteric and  retroperitoneal lymph nodes, likely reactive.  3.  Resolution of ascites.  4.  Blood pool density lower than myocardium, compatible with  patient's known anemia.    I have personally reviewed the examination and initial interpretation  and I agree with the findings.    LAUREN RICHARD MD   US Breast Left Limited 1-3 Quadrants    Narrative    Examination: LEFT BREAST ULTRASOUND, 9/24/2019 8:52 AM     Comparison: CT chest abdomen and pelvis 9/23/2019.    History/Family History: Left breast lump located circumferentially  around the nipple noted by patient approximately one month ago.  Swelling under left breast nipple. Evaluate for left breast abscess.    Findings: In the retroareolar left breast there is moderate  gynecomastia, as seen on CT one day prior. No fluid collection  concerning for  abscess.      Impression    IMPRESSION: BI-RADS CATEGORY: 2 - Benign. Gynecomastia.    RECOMMENDED FOLLOW-UP: Clinical follow-up.      I have personally reviewed the examination and initial interpretation  and I agree with the findings.    MADISON PEREZ MD   XR Abdomen Port 1 View    Narrative    XR ABDOMEN PORT 1 VW  9/24/2019 5:39 PM    History:  c diff.     Comparison: CT cap on 9/23/2019    Findings:   Portable supine AP abdominal radiograph. Nonobstructive bowel gas  pattern. No pneumatosis, portal venous gas. No acute osseous  abnormality. The visualized lung fields are clear.      Impression    IMPRESSION:  None obstructive bowel gas pattern without pneumatosis or portal  venous gas.    I have personally reviewed the examination and initial interpretation  and I agree with the findings.    MADISON PEREZ MD   XR Abdomen Port 1 View    Narrative    XR ABDOMEN PORT 1 VW  9/26/2019 5:05 PM    History:  C diff.     Comparison: Abdominal radiograph on 9/24/2019    Findings:   Supine portable AP abdominal radiograph. None obstructive bowel gas  pattern without pneumatosis or portal venous gas. No acute osseous  abnormality.      Impression    IMPRESSION:  Nonobstructive bowel gas pattern without pneumatosis or portal venous  gas.    I have personally reviewed the examination and initial interpretation  and I agree with the findings.    KISHA GUAMAN MD       Again, thank you for allowing me to participate in the care of your patient.      Sincerely,    Ric Shukla MD

## 2019-10-15 NOTE — NURSING NOTE
Transplant Coordinator Note    Reason for visit: 8 month post heart transplant.  Coordinator: Present   Caregiver:  None    Health concerns addressed today:  1. Pt refused angio 10/15 ngoc no ride.   2. 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   3. Saw ID yesterday. Plan???   4. Valcyte 450 mg daily per Dr. alba (ngoc of rising cr)  5. Recent wbc 2, todays 2.7.   6. Toxo- tested pre-transplant. <3.  R-/D-  7. PCW 6- no diuretics.   8. Endocrine appt on 10/22 and GI 11/26.   9. Iron studies completed for low hgb.   10. No DSAs/early rejection.   11. Baseline angio? 3/18 C no results.   12. Diarrhea resolved.     Immunosuppressants:  Mycophenolate 1500 bid.  tacro goal 6-8. Current dose 3/2. Level 6.7. Good 12 hour trough?      RHC, echo, resulted labs reviewed with patient  Medication record reviewed and reconciled  Questions and concerns addressed  Pt verbalized an understanding of plan of care.     Patient Instructions  1. Weekly CMV levels per Dr. Alba. Standing orders in and lab letter provided.   2. We will do angio at your annual appointment.   3. No biopsy needed at 10 month. Echo and labs.   4. Please contact me if diarrhea comes back.     Next transplant clinic appointment:  10 month w/ echo, just allomap, and labs.   Next lab draw: weekly.   Coordinator will call with all pending results.     Please call transplant coordinator with any questions:    Jocelynn Jerez RN BSN   Post Heart Transplant Nurse Coordinator  [facility]  Questions: [phone]

## 2019-10-15 NOTE — DISCHARGE INSTRUCTIONS
[facility]                        Interventional Cardiology  Discharge Instructions   Post Right Heart Cath and Heart Biopsy    AFTER YOU GO HOME:    DO drink plenty of fluids    DO resume your regular diet and medications unless otherwise instructed by your Primary Physician    Do Not scrub the procedure site vigorously    No lotion or powder to the puncture site for 3 days    CALL YOUR PRIMARY PHYSICIAN IF: You may resume all normal activity.  Monitor neck site for bleeding, swelling, or voice changes. If you notice bleeding or swelling immediately apply pressure to the site and call number below to speak with Cardiology Fellow.  If you experience any changes in your breathing you should call your doctor immediately or come to the closest Emergency Department.  Do not drive yourself.    ADDITIONAL INSTRUCTIONS: Medications: You are to resume all home medications including anticoagulation therapy unless otherwise advised by your primary cardiologist or nurse coordinator.    Follow Up: Per your primary cardiology team    If you have any questions or concerns regarding your procedure site please call [phone] at anytime and ask for Cardiology Fellow on call.  They are available 24 hours a day.  You may also contact the Cardiology Clinic after hours number at [phone].                                                       Telephone Numbers [phone] Monday-Friday 8:00 am to 4:30 pm    [phone] After 4:30 pm Monday-Friday, Weekends & Holidays  Ask for Interventional Cardiologist on call. Someone is on call 24 hours/day   [facility] toll free number [phone] Monday-Friday 8:00 am to 4:30 pm   [facility] Emergency Dept [phone]

## 2019-10-15 NOTE — PROGRESS NOTES
[facility]    Transplant Infectious Diseases Outpatient Progress note      Patient:  Everton Larios, Date of birth 1965, Medical record number [id number]  Date of Visit:  10/15/2019         Recommendations:   1. Ideally we should increase the dose of VGCV to 450 mg bid or 900 mg daily, however, the patient is having significant financial strain and not able to afford it.   I will continue VGCV 450 mg daily till CMV PCR 10/15/2019 is resulted.   2. Good candidate for shingrix but he's waiting for it to be covered by his insurance.    3. Continue CMV weekly.     RTC: 1 month.         Summary of Presentation:   Transplants:  2/24/2019 (Heart), Postoperative day:  233     This patient is a 54 year old male with congenital heart disease s/p OHT in 2/2019 with methylprednisolone induction and TAC/MMF for maintenance.   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         Active Problems and Infectious Diseases Issues:   1. CMV viremia.   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   In 7/2019 CMV was 3.5 log. It was repeated 9/17/2019 and resulted at 3.9 log. This very slow replication rate is probably due to CMV +/+.   Now CMV VL is doen to 2.6 log as of 10/3/2019. However this with the need to keep adjusting the dose of VGCV due to renal failure.     Ideally, the patient should be switched to VGCV PO bid due to improving kidney function of 44.   The patient is having a significant financial strain, and is not really able to afford the VGCV.  Given that the Variable system is down and he's not able to fill the prescription today anyway, I will wait for the results of CMV PCR 10/15/2019 and hopefully I can move to maintenance therapy rather than induction therapy.        The diarrhea and  abdominal pain have both resolved either due to CMV therapy or CDI therapy or both.     He met with our pharmacist to assess eligibility for any coupons/VGCV assistant programs.     2. FRANKLIN on CRF.   Improving.   Will continue to adjust the VGCV dose accordingly.         Old Problems and Infectious Diseases Issues:   1. Drug induced neutropenia resolved after stopping bactrim and VGCV and with couple of doses of G-CSF.  2. P aeruginosa BSI 4/29/2019 with P aeruginosa HCAP and pulmonary nodules in the setting of febrile neutropenia. We treated him for severe pseudomonal pneumonia with 4 week course of ABx till 5/28/2019. He was also treated with IV micafungin till 5/28/2019 due to pulmonary nodules and high risk for aspergillus given positive BD glucan. CT scan continues to improve while off of antimicrobilals as evidenced on a CT done this admission on 9/24/2019 while off of ABx and antifungals.    3. QTc prolongation. Prevented the use of azole and fluoroquinolones.   4. CDI 9/2019 treated with 2 weeks of PO vancomycin.       Other Infectious Disease issues include:  - QTc 532 9/23/2019.   - PCP prophylaxis: off of bactrim since 4/26/2019. Was given pentamidine till 8/2019.   - Serostatus: CMV D+/R+, EBV D+/R+, HSV1+/2-, VZV +               currently on VGCV for induction therapy.   - Immunization status: good candidate for shingrix but he's waiting for it to be covered by his insurance.    - Gamma globulin status: 358 5/1/2019.       Attestation:  I interviewed the patient and obtained history from the patient, and by reviewing the patient's chart including outside records, microbiological data, and radiological data. All data are summarized in this notes.  Ric Shukla MD   Pager: [phone]  10/15/2019         Interim History:   Diarrhea and abdominal pain resolved.   He is very frustrated with the medical bills and now wondering if the OHT was actually worth it because he can't even pay his bills anymore.          HPI:       Transplants:  2/24/2019 (Heart); Postoperative day:  233    This patient is a 54 year old male with congenital heart disease s/p OHT in 2/2019 with methylprednisolone induction and TAC/MMF/prednisone for maintenance.      The course was complicated by neutropenia late 4/2019. He was then admitted with fever, neutropenia dyspnea, and cough.   Was found to have multiple pulmonary nodules with GGO on chest CT. Blood cx on 4/29/2019 and BAL on 4/30/2019 grew P aeruginosa.   Treated with cefepime IV for 4 weeks.   Due to multiple risk factors for aspergillosis including neutropenia and positive BD glucan, he was also empirically treated with micafunin IV starting 5/6/2019.   We could not use azoles and fluoroquinolones due to prolonged QTc at baseline. Costs prohibited the use of isavuconazole.      Neutropenia resolved after the 2 doses of G-CSF on 5/1/2019 and 5/2/2019. Also after the discontinuation of VGCV and bactrim and decreasing the dose of MMF.       Due to neutropenia and inability to use VGCV, ID recommended weekly CMV PCR in the setting of increasing MMF.   In July of 2019 his CMV VL was 3.5 log, it was repeated in 9/2019 and was 3.9 log. He was started on VGCV adjusted to kidney function on 9/20/2019.  He was also complaining of diarrhea and abdominal pain. I thought the abdominal pain was due to CMV. He was evaluated the same day by GI due to hx of UC. His C diff test was positive and was treated with 14 days of vancomycin PO.               Past Medical History:     Past Medical History:   Diagnosis Date     Alcohol abuse      Pierce's esophagus 10/4/2018     Chronic rhinitis 10/4/2018     Chronic systolic heart failure (H) 10/4/2018     Depression 10/4/2018     Diastolic dysfunction      DJD (degenerative joint disease) - neck 10/4/2018     H/O congenital atrial septal defect (ASD) repair at age 3 10/4/2018     Hypothyroidism due to medication 10/4/2018     ICD, [employer] 2008;  gen change 2/2018 10/4/2018     Intermittent asthma without complication 10/4/2018     Nonischemic cardiomyopathy (H) 10/4/2018     On amiodarone therapy 10/4/2018     Paroxysmal atrial fibrillation (H) 10/4/2018     S/P ablation of atrial fibrillation 10/4/2018     Systolic heart failure (H)      Ulcerative colitis (H) 10/4/2018     Ventricular tachycardia (H) 10/4/2018             Past Surgical History:     Past Surgical History:   Procedure Laterality Date     AICD, DUAL CHAMBER       BRONCHOSCOPY (RIGID OR FLEXIBLE), DIAGNOSTIC N/A 4/30/2019    Procedure: BRONCHOSCOPY, WITH BAL;  Surgeon: Margot Chiang MD;  Location:  GI     CV HEART BIOPSY N/A 3/4/2019    Procedure: Heart Biopsy;  Surgeon: Abhijeet Titus MD;  Location:  HEART CARDIAC CATH LAB     CV HEART BIOPSY N/A 3/25/2019    Procedure: Heart Biopsy;  Surgeon: Yves Rodrigues MD;  Location:  HEART CARDIAC CATH LAB     CV HEART BIOPSY N/A 3/28/2019    Procedure: Heart Cath Heart Biopsy;  Surgeon: Yves Rodrigues MD;  Location:  HEART CARDIAC CATH LAB     CV HEART BIOPSY N/A 4/10/2019    Procedure: CV HEART BIOPSY;  Surgeon: Isiah Mcallister MD;  Location: [facility] CARDIAC CATH LAB     CV HEART BIOPSY N/A 4/24/2019    Procedure: CV HEART BIOPSY;  Surgeon: Isiah Mcallister MD;  Location:  HEART CARDIAC CATH LAB     CV HEART BIOPSY N/A 5/8/2019    Procedure: CV HEART BIOPSY;  Surgeon: Isiah Mcallister MD;  Location: [facility] CARDIAC CATH LAB     CV HEART BIOPSY N/A 6/4/2019    Procedure: CV HEART BIOPSY;  Surgeon: Alton Solomon MD;  Location: [facility] CARDIAC CATH LAB     CV HEART BIOPSY N/A 5/22/2019    Procedure: CV HEART BIOPSY;  Surgeon: Yves Rodrigues MD;  Location:  HEART CARDIAC CATH LAB     CV HEART BIOPSY N/A 7/17/2019    Procedure: CV HEART BIOPSY;  Surgeon: Isiah Mcallister MD;  Location:  HEART CARDIAC CATH LAB     CV HEART BIOPSY N/A 8/13/2019    Procedure: CV HEART BIOPSY;  Surgeon:  Jackson Patten MD;  Location: [facility] C[facility]     CV HEART BIOPSY N/A 10/15/2019    Procedure: CV HEART BIOPSY;  Surgeon: Santino Mckeon MD;  Location:  HEART CARDIAC CATH LAB     CV INTRA-AORTIC BALLOON PUMP INSERTION N/A 2/18/2019    Procedure: Intra-Aortic Balloon;  Surgeon: Alton Solomon MD;  Location:  HEART CARDIAC CATH LAB     CV INTRA-AORTIC BALLOON PUMP INSERTION N/A 2/20/2019    Procedure: Replace subclavian IABP;  Surgeon: Yves Rodrigues MD;  Location:  HEART CARDIAC CATH LAB     CV LEFT HEART CATH N/A 3/19/2019    Procedure: Left Heart Cath;  Surgeon: Yves Rodrigues MD;  Location:  HEART CARDIAC CATH LAB     CV RIGHT HEART CATH N/A 3/19/2019    Procedure: RHC/HBx - Femoral access for 3/19 per Nimisha GONZALEZ;  Surgeon: Yves Rodrigues MD;  Location:  HEART CARDIAC CATH LAB     CV RIGHT HEART CATH N/A 3/25/2019    Procedure: Right Heart Cath;  Surgeon: Yves Rodrigues MD;  Location:  HEART CARDIAC CATH LAB     CV RIGHT HEART CATH N/A 3/28/2019    Procedure: Heart Cath Right Heart Cath;  Surgeon: Yves Rodrigues MD;  Location:  HEART CARDIAC CATH LAB     CV RIGHT HEART CATH N/A 4/24/2019    Procedure: CV RIGHT HEART CATH;  Surgeon: Isiah Mcallister MD;  Location:  HEART CARDIAC CATH LAB     CV RIGHT HEART CATH N/A 3/11/2019    Procedure: ADD ON RHC/HBX;  Surgeon: Alton Solomon MD;  Location:  HEART CARDIAC CATH LAB     CV RIGHT HEART CATH N/A 6/4/2019    Procedure: CV RIGHT HEART CATH;  Surgeon: Alton Solomon MD;  Location:  HEART CARDIAC CATH LAB     CV RIGHT HEART CATH N/A 5/22/2019    Procedure: CV RIGHT HEART CATH;  Surgeon: Yves Rodrigues MD;  Location:  HEART CARDIAC CATH LAB     CV RIGHT HEART CATH N/A 8/13/2019    Procedure: CV RIGHT HEART CATH;  Surgeon: Jackson Patten MD;  Location:  HEART CARDIAC CATH LAB     CV RIGHT HEART CATH N/A 10/15/2019    Procedure: CV RIGHT HEART CATH;   Surgeon: Santino Mckeon MD;  [facility]     INCISION AND DRAINAGE CHEST WASHOUT, COMBINED N/A 3/21/2019    Procedure: Incision And Drainage; Evacuation Right Chest Wall Hematoma;  Surgeon: Dewayne House MD;  [facility]     INSERT INTRAAORTIC BALLOON PUMP Left 2019    Procedure: Insert left  Subclavian Balloon Pump,  Removal Right femoral arterial balloom pump sheath;  Surgeon: Dewayne House MD;  [facility]     INSERT INTRAAORTIC BALLOON PUMP Left 2019    Procedure: SUBCLAVIAN BALLOON PUMP PLACEMENT;  Surgeon: Ben White MD;  [facility]     IR PICC PLACEMENT > 5 YRS OF AGE  2019     TRANSPLANT HEART RECIPIENT N/A 2019    Procedure: TRANSPLANT HEART RECIPIENT;  Surgeon: Dewayne House MD;  [facility]               Social History:     Social History     Tobacco Use     Smoking status: Former Smoker     Packs/day: 0.00     Years: 10.00     Pack years: 0.00     Start date: 1980     Last attempt to quit: 2008     Years since quittin.7     Smokeless tobacco: Never Used   Substance Use Topics     Alcohol use: Not Currently     Alcohol/week: 1.0 standard drinks     Types: 1 Cans of beer per week             Family History:     Family History   Problem Relation Age of Onset     Endometrial Cancer Mother      Hypertension Father      Endometrial Cancer Maternal Grandmother              Immunizations:     Immunization History   Administered Date(s) Administered     Influenza (IIV3) PF 10/22/2008, 2009, 2011, 2012, 10/27/2013, 10/02/2014     Influenza Vaccine IM > 6 months Valent IIV4 10/02/2015     Influenza Vaccine, 3 YRS +, IM (QUADRIVALENT W/PRESERVATIVES) 2016, 10/16/2017, 2018     OPV, trivalent, live 10/20/1978     Pneumo Conj 13-V (2010&after) 2018     Pneumococcal 23 valent 2019     TDAP Vaccine (Boostrix) 2012     Td (Adult), Adsorbed 10/20/1978              "Allergies:     Allergies   Allergen Reactions     Hydromorphone Other (See Comments)     Significant Delirium     Adhesive Tape Blisters     Tegaderm causes bruises, blisters and extreme irritation.     Lisinopril Other (See Comments)     hypotension     Quinolones Other (See Comments)     Would not rx quinolones until QTc interval improved.      Tobramycin Other (See Comments)     Would not use aminoglycosides as his kidney function is very borderline     Codeine Nausea             Medications:     No current outpatient medications on file.     No current facility-administered medications for this visit.             Review of Systems:   As mentioned in the interim history otherwise negative by reviewing constitutional symptoms, central and peripheral neurological systems, respiratory system, cardiac system, GI system,  system, musculoskeletal, skin, allergy, and lymphatics.                  Physical Exam:   /80 (BP Location: Right arm, Patient Position: Sitting, Cuff Size: Adult Regular)   Pulse 120   Temp 98.5  F (36.9  C) (Oral)   Ht 1.727 m (5' 7.99\")   Wt 61 kg (134 lb 8 oz)   SpO2 100%   BMI 20.46 kg/m      Constitutional: awake, alert, cooperative, no apparent distress and appears at stated age, well nourished.   Neurologic: Patient is moving all extremities without focal deficit, no focal sensory loss.   Extremities: no pitting edema of bilateral lower extremities, no ulcers, normal ROM of all joints, no swelling or erythema of any of joints and no tenderness to palpation.            Laboratory Data:     No results found for: ACD4    Inflammatory Markers    Recent Labs   Lab Test 09/24/19  0435 09/23/19  1534 09/17/19  1702 05/15/19  0940 05/02/19  0536 04/29/19  0911 11/15/18  0955 10/22/18  1239   SED  --  17 18  --  37*  --   --   --    CRP <2.9 3.8 <2.9 <2.9 69.0* 120.0* 6.1 12.5       Immune Globulin Studies      Recent Labs   Lab Test 09/17/19  1702 05/01/19  1850   IGG  --  358*   IGM  " --  29*   IGE  --  38    106       Metabolic Studies    Recent Labs   Lab Test 10/15/19  0835 10/03/19  0913 09/28/19  0537 09/27/19  0446 09/26/19  1127 09/25/19  0507  09/23/19  1534  07/17/19  1030  06/17/19  0926  05/22/19  0926  04/29/19  0918  02/23/19  1835    137 137 135 136 138   < > 134   < > 135   < > 137   < > 138   < >  --    < > 133   POTASSIUM 4.6 4.3 3.7 3.9 4.0 4.0   < > 4.6   < > 4.2   < > 4.1   < > 3.7   < >  --    < > 3.6   CHLORIDE 107 110* 111* 111* 110* 116*   < > 108   < > 103   < > 104   < > 102   < >  --    < > 93*   CO2 24 18* 14* 12* 15* 14*   < > 15*   < > 24   < > 20   < > 25   < >  --    < > 30   ANIONGAP 8 9 12 11 11 8   < > 11   < > 8   < > 13   < > 10   < >  --    < > 10   BUN 31* 29 19 18 17 26   < > 47*   < > 17   < > 27   < > 53*   < >  --    < > 28   CR 1.20 1.61* 1.51* 1.49* 1.56* 1.61*   < > 2.41*   < > 1.07   < > 1.39*   < > 2.43*   < >  --    < > 1.03   GFRESTIMATED 67 47* 51* 52* 49* 47*   < > 29*   < > 77   < > 56*   < > 29*   < >  --    < > 81   GLC 82 78 75 74 87 90   < > 96   < > 80   < > 80   < > 82   < >  --    < > 114*   A1C  --   --   --   --   --   --   --   --   --   --   --   --   --   --   --   --   --  5.7*   RADAMES 9.1 8.7 8.7 8.3* 8.6 8.5   < > 9.0   < > 8.9   < > 8.8   < > 9.3   < >  --    < > 8.9   PHOS 4.2  --   --   --   --   --   --  4.8*  --  4.0  --  3.8   < > 4.2   < >  --    < > 3.6   MAG 1.8  --   --   --   --   --   --  2.0  --  1.7  --  1.4*   < > 1.9   < >  --    < > 2.0   LACT  --   --   --   --   --   --   --  1.2  --   --   --   --   --   --   --  1.7   < >  --    CKT 56  --   --   --   --   --   --   --   --  34  --  46  --  26*  --   --   --   --     < > = values in this interval not displayed.       Hepatic Studies    Recent Labs   Lab Test 10/15/19  0835 09/28/19  0537 09/27/19  0446 09/26/19  1127 09/25/19  0507 09/24/19  0435  03/14/19  1653  03/10/19  0410   BILITOTAL 0.4 0.5 0.4 0.4 0.3 0.4   < >  --    < > 0.8   ALKPHOS 230*  221* 233* 272* 237* 218*   < >  --    < > 104   PROTTOTAL 7.0 6.0* 5.9* 6.7* 5.7* 5.4*   < >  --    < > 5.0*   ALBUMIN 3.6 3.2* 3.0* 3.4 3.1* 2.9*   < >  --    < > 2.8*   AST 15 14 10 15 11 11   < >  --    < > 22   ALT 29 14 14 17 15 14   < >  --    < > 19   LDH  --   --   --   --   --   --   --  320*  --  276*    < > = values in this interval not displayed.       Hematology Studies     Recent Labs   Lab Test 10/15/19  0835 10/03/19  0913 09/28/19  0537 09/27/19  0446 09/26/19  1127 09/26/19  0424 09/25/19  0507 09/24/19  0437  09/23/19  1534 09/17/19  1702 09/17/19  0856   WBC 2.7* 2.0* 3.5* 4.0 4.2  --  3.5* 3.6*  --  4.6 3.7* 3.5*   ANEU  --   --  2.7 2.9  --   --   --  1.9  --  2.9 2.0 1.9   ALYM  --   --  0.5* 0.9  --   --   --  0.8  --  1.0 1.1 0.9   GRACE  --   --  0.1 0.1  --   --   --  0.4  --  0.4 0.4 0.5   AEOS  --   --  0.2 0.1  --   --   --  0.3  --  0.3 0.3 0.3   HGB 8.5* 8.5* 8.0* 7.7* 9.0*  --  8.0* 7.6*  --  10.2* 10.1* 9.5*   HCT 28.7* 25.6* 24.8* 24.2* 28.8*  --  25.7* 24.2*  --  32.7* 32.8* 28.5*    386 247 231 243 247 228 234   < > 290 284 279    < > = values in this interval not displayed.       Clotting Studies    Recent Labs   Lab Test 09/23/19  1534 05/08/19  0504 05/07/19  0451 05/06/19  0438  03/14/19  1653  02/27/19  0421 02/25/19  1502   INR 1.17* 1.22* 1.33* 1.31*   < > 1.36*   < >  --  1.29*   PTT 35  --   --   --   --  46*  --  38* 32    < > = values in this interval not displayed.       Urine Studies    Recent Labs   Lab Test 05/26/19  1006 04/29/19  1031 03/26/19  1707 03/17/19  0045 03/04/19  1303   URINEPH 6.5 6.0 5.5 5.5 5.0   NITRITE Negative Negative Negative Negative Negative   LEUKEST Negative Negative Negative Negative Negative   WBCU 0 <1 2 15* 0         Microbiology:  Last 6 Culture results with specimen source  Culture Micro   Date Value Ref Range Status   09/23/2019 No growth  Final   05/02/2019 No growth  Final   05/02/2019 No growth  Final   05/02/2019 (A)  Final     Canceled, Test credited  >10 Squamous epithelial cells/low power field indicates oral contamination. Please   recollect.     05/01/2019 No growth  Final   05/01/2019 No growth  Final    Specimen Description   Date Value Ref Range Status   09/26/2019 Feces  Final   09/23/2019 Blood Left Arm  Final   09/20/2019 Feces  Final   09/20/2019 Feces  Final   09/20/2019 Feces  Final   05/02/2019 Blood Right Hand  Final   05/02/2019 Blood Right Hand  Final        Last check of C difficile  C Diff Toxin B PCR   Date Value Ref Range Status   09/20/2019 Positive (A) NEG^Negative Final     Comment:     Positive: Toxin producing C. difficile target DNA sequences detected, presumed   positive for C. difficile toxin B. C. difficile (Requires Enteric Isolation).      Clostridium difficile (Requires Enteric Isolation)  FDA approved assay performed using [employer] GeneXpert real-time PCR.           Virology:  CMV viral loads    CMV viral loads  No results found for: 83093, 90358, 73656, 72433  CMV viral loads    Recent Labs   Lab Test 10/03/19  0913 09/26/19  0424   CSPEC Plasma EDTA PLASMA   CMVLOG 2.6* 3.0*       CMV viral loads    Log IU/mL of CMVQNT   Date Value Ref Range Status   10/03/2019 2.6 (H) <2.1 [Log_IU]/mL Final   09/26/2019 3.0 (H) <2.1 [Log_IU]/mL Final   09/17/2019 3.9 (H) <2.1 [Log_IU]/mL Final   07/24/2019 3.5 (H) <2.1 [Log_IU]/mL Final   06/26/2019 <2.1 <2.1 [Log_IU]/mL Final   06/04/2019 Not Calculated <2.1 [Log_IU]/mL Final   05/24/2019 Not Calculated <2.1 [Log_IU]/mL Final   05/22/2019 Not Calculated <2.1 [Log_IU]/mL Final   05/15/2019 Not Calculated <2.1 [Log_IU]/mL Final   05/06/2019 Not Calculated <2.1 [Log_IU]/mL Final   04/30/2019 Not Calculated <2.1 [Log_IU]/mL Final   04/29/2019 Not Calculated <2.1 [Log_IU]/mL Final   04/26/2019 Not Calculated <2.1 [Log_IU]/mL Final   04/24/2019 Not Calculated <2.1 [Log_IU]/mL Final   04/17/2019 Not Calculated <2.1 [Log_IU]/mL Final   04/08/2019 Not Calculated <2.1  [Log_IU]/mL Final   04/01/2019 Not Calculated <2.1 [Log_IU]/mL Final   03/25/2019 Not Calculated <2.1 [Log_IU]/mL Final   03/18/2019 Not Calculated <2.1 [Log_IU]/mL Final       CMV resistance testing  No lab results found.  No results found for: CMVCID, CMVFOS, CMVGAN   EBV viral loads     Recent Labs   Lab Test 09/17/19  1702 05/22/19  0926 04/29/19  0911 04/26/19  0848 03/27/19  0521   EBRES EBV DNA Not Detected 541* EBV DNA Not Detected EBV DNA Not Detected EBV DNA Not Detected     EBV DNA Copies/mL   Date Value Ref Range Status   09/17/2019 EBV DNA Not Detected EBVNEG^EBV DNA Not Detected [Copies]/mL Final   05/22/2019 541 (A) EBVNEG^EBV DNA Not Detected [Copies]/mL Final   04/29/2019 EBV DNA Not Detected EBVNEG^EBV DNA Not Detected [Copies]/mL Final   04/26/2019 EBV DNA Not Detected EBVNEG^EBV DNA Not Detected [Copies]/mL Final   03/27/2019 EBV DNA Not Detected EBVNEG^EBV DNA Not Detected [Copies]/mL Final       Human Herpes Virus 6 viral loads    No results found for: H6RES No results found for: H6SPEC    CMV Antibody IgG   Date Value Ref Range Status   02/23/2019 >8.0 (H) 0.0 - 0.8 AI Final     Comment:     Positive  Antibody index (AI) values reflect qualitative changes in antibody   concentration that cannot be directly associated with clinical condition or   disease state.     02/15/2019 >8.0 (H) 0.0 - 0.8 AI Final     Comment:     Positive  Antibody index (AI) values reflect qualitative changes in antibody   concentration that cannot be directly associated with clinical condition or   disease state.       Toxoplasma Antibody IgG   Date Value Ref Range Status   02/22/2019 <3.0 0.0 - 7.1 IU/mL Final     Comment:     Negative- Absence of detectable Toxoplasma gondii IgG antibodies. A negative   result does not rule out acute infection.  The magnitude of the measured result is not indicative of the amount of   antibody present. The concentrations of anti-Toxoplasma gondii IgG in a given   specimen determined  with assays from different manufacturers can vary due to   differences in assay methods and reagent specificity.         Imaging:  Results for orders placed or performed during the hospital encounter of 09/23/19   CT Chest Abdomen Pelvis w/o Contrast    Narrative    EXAMINATION: CT CHEST ABDOMEN PELVIS W/O CONTRAST  9/23/2019 6:27 PM      CLINICAL HISTORY: increase ab pain with n/v/d hx of c diff and cmv  infection. patient with FRANKLIN also-- no contrast.    COMPARISON: Chest CT on 5/22/2019, CT cap on 4/29/2019        PROCEDURE COMMENTS: CT of the chest, abdomen, and pelvis was performed  without intravenous and oral contrast. Axial MIP  images of the chest,  and coronal and sagittal reformatted images of the chest, abdomen, and  pelvis obtained.    FINDINGS:      Chest:  The trachea and central airways are clear.  There is no mass or  infiltration. Residual left upper lobe 4 mm nodularity (image 52  series 4), previously measured 10 x 8 mm. No significant pulmonary  nodules. No pleural effusion or pneumothorax.    Unchanged asymmetric enlarged left thyroid lobe. Postoperative changes  of heart transplant. Stable positioning of left brachiocephalic vein  stent. There is no pericardial effusion.  Normal thoracic vasculature.  No significant mediastinal, hilum or axillary lymphadenopathy.     Abdomen/pelvis:   Limited evaluation due to lack of IV contrast.    Liver: No focal mass. Unchanged multiple small hypoattenuating  lesions, too small to characterize.    Gallbladder: No radiopaque gallstones. No pericystic fluid.    Pancreas: No pancreatic mass or pancreatic duct dilatation.    Spleen: Not enlarged.    Adrenal glands: Within normal limits.    Urinary tract: Kidneys are normal in appearance. There is no evidence  for hydronephrosis or hydroureter. Urinary bladder is unremarkable.    Reproductive organs: Within normal limits.    GI system: No abnormally dilated small bowel or colon. No definite  bowel wall  thickening.    Peritoneum/fluid: No ascites     Vessels: No aneurysmal dilatation of the abdominal aorta.  The portal,  splenic, and superior mesenteric veins are patent.  The origins of the  celiac and superior mesenteric arteries are patent.    Lymph nodes: No enlarged lymphadenopathy. Numerous subcentimeter  mesenteric, retroperitoneal lymph nodes.    Bones and the soft tissues: No aggressive osseous lesions. Stable T12  compression deformity with approximately 30% vertebral body height  loss..      Impression    IMPRESSION:  1.  Continued decrease in size of left upper lung lesion with residual  nodularity, previously measured 10 x 8 mm, suggestive of  infectious/inflammatory process. No new pulmonary lesions.  2.  Limited evaluation of abdomen and pelvis due to lack of IV  contrast. Nonspecific numerous subcentimeter mesenteric and  retroperitoneal lymph nodes, likely reactive.  3.  Resolution of ascites.  4.  Blood pool density lower than myocardium, compatible with  patient's known anemia.    I have personally reviewed the examination and initial interpretation  and I agree with the findings.    LAUREN RICHARD MD   US Breast Left Limited 1-3 Quadrants    Narrative    Examination: LEFT BREAST ULTRASOUND, 9/24/2019 8:52 AM     Comparison: CT chest abdomen and pelvis 9/23/2019.    History/Family History: Left breast lump located circumferentially  around the nipple noted by patient approximately one month ago.  Swelling under left breast nipple. Evaluate for left breast abscess.    Findings: In the retroareolar left breast there is moderate  gynecomastia, as seen on CT one day prior. No fluid collection  concerning for abscess.      Impression    IMPRESSION: BI-RADS CATEGORY: 2 - Benign. Gynecomastia.    RECOMMENDED FOLLOW-UP: Clinical follow-up.      I have personally reviewed the examination and initial interpretation  and I agree with the findings.    MADISON PEREZ MD   XR Abdomen Port 1 View     Narrative    XR ABDOMEN PORT 1 VW  9/24/2019 5:39 PM    History:  c diff.     Comparison: CT cap on 9/23/2019    Findings:   Portable supine AP abdominal radiograph. Nonobstructive bowel gas  pattern. No pneumatosis, portal venous gas. No acute osseous  abnormality. The visualized lung fields are clear.      Impression    IMPRESSION:  None obstructive bowel gas pattern without pneumatosis or portal  venous gas.    I have personally reviewed the examination and initial interpretation  and I agree with the findings.    MADISON PEREZ MD   XR Abdomen Port 1 View    Narrative    XR ABDOMEN PORT 1 VW  9/26/2019 5:05 PM    History:  C diff.     Comparison: Abdominal radiograph on 9/24/2019    Findings:   Supine portable AP abdominal radiograph. None obstructive bowel gas  pattern without pneumatosis or portal venous gas. No acute osseous  abnormality.      Impression    IMPRESSION:  Nonobstructive bowel gas pattern without pneumatosis or portal venous  gas.    I have personally reviewed the examination and initial interpretation  and I agree with the findings.    KISHA GUAMAN MD

## 2019-10-16 ENCOUNTER — OFFICE VISIT (OUTPATIENT)
Dept: CARDIOLOGY | Facility: CLINIC | Age: 54
End: 2019-10-16
Attending: INTERNAL MEDICINE
Payer: COMMERCIAL

## 2019-10-16 ENCOUNTER — TELEPHONE (OUTPATIENT)
Dept: TRANSPLANT | Facility: CLINIC | Age: 54
End: 2019-10-16

## 2019-10-16 ENCOUNTER — RECORDS - HEALTHEAST (OUTPATIENT)
Dept: ADMINISTRATIVE | Facility: OTHER | Age: 54
End: 2019-10-16

## 2019-10-16 VITALS
OXYGEN SATURATION: 99 % | HEIGHT: 68 IN | DIASTOLIC BLOOD PRESSURE: 74 MMHG | HEART RATE: 120 BPM | WEIGHT: 132.3 LBS | BODY MASS INDEX: 20.05 KG/M2 | SYSTOLIC BLOOD PRESSURE: 132 MMHG

## 2019-10-16 DIAGNOSIS — Z94.1 TRANSPLANTED HEART (H): Primary | ICD-10-CM

## 2019-10-16 LAB
COPATH REPORT: NORMAL
DONOR IDENTIFICATION: NORMAL
DSA COMMENTS: NORMAL
DSA PRESENT: NO
DSA TEST METHOD: NORMAL
EBV DNA # SPEC NAA+PROBE: NORMAL {COPIES}/ML
EBV DNA SPEC NAA+PROBE-LOG#: NORMAL {LOG_COPIES}/ML
INTERPRETATION ECG - MUSE: NORMAL
ORGAN: NORMAL
SA1 CELL: NORMAL
SA1 COMMENTS: NORMAL
SA1 HI RISK ABY: NORMAL
SA1 MOD RISK ABY: NORMAL
SA1 TEST METHOD: NORMAL
SA2 CELL: NORMAL
SA2 COMMENTS: NORMAL
SA2 HI RISK ABY UA: NORMAL
SA2 MOD RISK ABY: NORMAL
SA2 TEST METHOD: NORMAL
STFR SERPL-SCNC: 2 MG/L (ref 2.2–5)
UNACCEPTABLE ANTIGEN: NORMAL
UNOS CPRA: 0

## 2019-10-16 PROCEDURE — G0463 HOSPITAL OUTPT CLINIC VISIT: HCPCS | Mod: 25,ZF

## 2019-10-16 PROCEDURE — 99214 OFFICE O/P EST MOD 30 MIN: CPT | Mod: GC | Performed by: INTERNAL MEDICINE

## 2019-10-16 ASSESSMENT — ENCOUNTER SYMPTOMS
SINUS CONGESTION: 1
LEG PAIN: 1
FATIGUE: 1
ABDOMINAL PAIN: 0
BLOATING: 0
MUSCLE CRAMPS: 1
TROUBLE SWALLOWING: 0
RECTAL PAIN: 0
HOARSE VOICE: 0
NECK PAIN: 1
BLOOD IN STOOL: 0
VOMITING: 0
NIGHT SWEATS: 1
ALTERED TEMPERATURE REGULATION: 1
BACK PAIN: 1
ORTHOPNEA: 0
FEVER: 0
SINUS CONGESTION: 1
INCREASED ENERGY: 1
HEARTBURN: 0
POLYDIPSIA: 0
HEARTBURN: 0
CONSTIPATION: 1
MUSCLE WEAKNESS: 0
PANIC: 0
DIARRHEA: 0
SLEEP DISTURBANCES DUE TO BREATHING: 0
SMELL DISTURBANCE: 0
DECREASED CONCENTRATION: 0
BLOATING: 0
SINUS PAIN: 0
JOINT SWELLING: 0
PANIC: 0
DIARRHEA: 0
HALLUCINATIONS: 0
MUSCLE WEAKNESS: 0
SINUS PAIN: 0
DEPRESSION: 1
LIGHT-HEADEDNESS: 0
DECREASED CONCENTRATION: 0
DECREASED APPETITE: 1
WEIGHT GAIN: 1
MYALGIAS: 1
BACK PAIN: 1
MYALGIAS: 1
HALLUCINATIONS: 0
ORTHOPNEA: 0
ABDOMINAL PAIN: 0
NIGHT SWEATS: 1
NERVOUS/ANXIOUS: 1
JOINT SWELLING: 0
SLEEP DISTURBANCES DUE TO BREATHING: 0
NERVOUS/ANXIOUS: 1
JAUNDICE: 0
BOWEL INCONTINENCE: 0
FATIGUE: 1
SORE THROAT: 0
NECK MASS: 0
LIGHT-HEADEDNESS: 0
TASTE DISTURBANCE: 1
INCREASED ENERGY: 1
NECK MASS: 0
WEIGHT GAIN: 1
CONSTIPATION: 1
FEVER: 0
NECK PAIN: 1
HYPOTENSION: 0
EXERCISE INTOLERANCE: 0
ARTHRALGIAS: 1
NAUSEA: 0
MUSCLE CRAMPS: 1
INSOMNIA: 1
BLOOD IN STOOL: 0
EXERCISE INTOLERANCE: 0
PALPITATIONS: 0
VOMITING: 0
POLYDIPSIA: 0
RECTAL PAIN: 0
NAUSEA: 0
SMELL DISTURBANCE: 0
STIFFNESS: 1
POLYPHAGIA: 0
PALPITATIONS: 0
INSOMNIA: 1
DECREASED APPETITE: 1
TROUBLE SWALLOWING: 0
STIFFNESS: 1
CHILLS: 1
HOARSE VOICE: 0
BOWEL INCONTINENCE: 0
POLYPHAGIA: 0
ALTERED TEMPERATURE REGULATION: 1
DEPRESSION: 1
SORE THROAT: 0
JAUNDICE: 0
SYNCOPE: 0
ARTHRALGIAS: 1
HYPERTENSION: 0
HYPOTENSION: 0
TASTE DISTURBANCE: 1
CHILLS: 1
HYPERTENSION: 0
WEIGHT LOSS: 0
LEG PAIN: 1
SYNCOPE: 0
WEIGHT LOSS: 0

## 2019-10-16 ASSESSMENT — PAIN SCALES - GENERAL: PAINLEVEL: NO PAIN (0)

## 2019-10-16 ASSESSMENT — MIFFLIN-ST. JEOR: SCORE: 1404.61

## 2019-10-16 NOTE — PROGRESS NOTES
[facility]       HPI:  Everton Larios is a 54 year old male with a history of ASD (s/p repair in childhood), AFib/AF (s/p ablation), NICM, diastolic dysfunction, alcohol abuse, Pierce's esophagus, ulcerative colitis, GIB (s/p splenic embolization), and hypothyroidism, now s/p OHT and bypass of persistent left SVC to right atrial appendage 2/24/19 who presents to clinic for routine surveillance. This is his 8 month post-transplant visit.    His postoperative course was c/b shock due to RV dysfunction requiring IABP support, small pneumoperitoneum (clinically insignificant), chest wall hematoma (former ICD site, s/p evacuation and drain placement 3/31/19), HCAP/klebsiella pneumonia, and FRANKLIN (required short-term HD, now recovered).  He was transferred to  rehab 4/3, and ultimately discharged to his local residence 4/15.     His biopsy 3/25/19 showed mild AMR1 with some staining for c4d.  Repeat biopsy 3/28 showed improved AMR and less reactive capillary staining, and subsequent biopsies have now shown resolution of AMR and improved capillary staining.  Baseline coronary angiogram was previously deferred due to renal function.  His last echocardiogram 10/15/19 showed stable graft function with LVEF 60-65%, normal RV function, and trace TR.     He had a fall in early April, and was found to have a compression fracture.     He was hospitalized 4/28-5/9 with fevers, URI symptoms, abdominal pain, and diarrhea, and was found to be neutropenic with pseudomonas bacteremia and HCAP.  Chest CT showed diffuse bilateral solid with peripheral groundglass pulmonary nodules/opacities and mediastinal adenopathy, BAL grew pseudomonas, and fungitell was positive.  Transplant ID was consulted, and he was treated with 4 weeks of IV cefepime and micafungin.  Repeat CT 5/22 showed significantly decreased bilateral nodular and consolidative opacities.     He represented to [facility] 5/24 with FRANKLIN  (creatinine 3.43), thought to be secondary to poor intake and self-adjusting diuretics.  He was given 2L NS and 1L LR, with creatinine trending down upon discharge 5/26.    He was recently hospitalized from 9/23-9/28/19 with CMV viremia and C diff colitis. Had diarrhea. 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 Did not have a colonoscopy.    Patient notes feeling overall well. Diarrhea resolved 1.5 weeks ago. Denies fevers. Has constant chills. Denies shortness of breath, orthopnea, and PND. Occasionally has swelling in ankles after prolonged standing at work. Has chronic intermittent left-sided chest pain that occurs at rest and lasts for 10 minutes. The chest pain has been present for years and started prior to his transplant. His abdominal pain has improved. Sometimes has abdominal discomfort after eating Denies nausea and emesis. Denies hematochezia, melena, dysuria, and hematuria.    He is frustrated with his medical bills and notes that he will not be able to afford Valcyte. He only has 3 pills of Valcyte left. He saw Dr. Shukla yesterday and ideally, he would like patient to be on Valcyte 450 mg BID or 900 mg every day but patient is having significant financial strain. Would like CMV to be checked weekly. Also, patient is upset that he was not informed in advance that he needed a ride in order to obtain a coronary angiogram and that his angiogram was canceled yesterday.       Past Medical History:   Diagnosis Date     Alcohol abuse      Pierce's esophagus 10/4/2018     Chronic rhinitis 10/4/2018     Chronic systolic heart failure (H) 10/4/2018     Depression 10/4/2018     Diastolic dysfunction      DJD (degenerative joint disease) - neck 10/4/2018     H/O congenital atrial septal defect (ASD) repair at age 3 10/4/2018     Hypothyroidism due to medication 10/4/2018     ICD, Provade 2008; gen change 2/2018 10/4/2018     Intermittent asthma without complication 10/4/2018     Nonischemic cardiomyopathy (H)  10/4/2018     On amiodarone therapy 10/4/2018     Paroxysmal atrial fibrillation (H) 10/4/2018     S/P ablation of atrial fibrillation 10/4/2018     Systolic heart failure (H)      Ulcerative colitis (H) 10/4/2018     Ventricular tachycardia (H) 10/4/2018       Past Surgical History:   Procedure Laterality Date     AICD, DUAL CHAMBER       BRONCHOSCOPY (RIGID OR FLEXIBLE), DIAGNOSTIC N/A 4/30/2019    Procedure: BRONCHOSCOPY, WITH BAL;  Surgeon: Marogt Chiang MD;  Location:  GI     CV HEART BIOPSY N/A 3/4/2019    Procedure: Heart Biopsy;  Surgeon: Abhijeet Titus MD;  Location:  HEART CARDIAC CATH LAB     CV HEART BIOPSY N/A 3/25/2019    Procedure: Heart Biopsy;  Surgeon: Yves Rodrigues MD;  Location:  HEART CARDIAC CATH LAB     CV HEART BIOPSY N/A 3/28/2019    Procedure: Heart Cath Heart Biopsy;  Surgeon: Yves Rodrigues MD;  Location:  HEART CARDIAC CATH LAB     CV HEART BIOPSY N/A 4/10/2019    Procedure: CV HEART BIOPSY;  Surgeon: Isiah Mcallister MD;  Location:  HEART CARDIAC CATH LAB     CV HEART BIOPSY N/A 4/24/2019    Procedure: CV HEART BIOPSY;  Surgeon: Isiah Mcallister MD;  Location:  HEART CARDIAC CATH LAB     CV HEART BIOPSY N/A 5/8/2019    Procedure: CV HEART BIOPSY;  Surgeon: Isiah Mcallister MD;  Location:  HEART CARDIAC CATH LAB     CV HEART BIOPSY N/A 6/4/2019    Procedure: CV HEART BIOPSY;  Surgeon: Alton Solomon MD;  Location:  HEART CARDIAC CATH LAB     CV HEART BIOPSY N/A 5/22/2019    Procedure: CV HEART BIOPSY;  Surgeon: Yves Rodrigues MD;  Location:  HEART CARDIAC CATH LAB     CV HEART BIOPSY N/A 7/17/2019    Procedure: CV HEART BIOPSY;  Surgeon: Isiah Mcallister MD;  Location:  HEART CARDIAC CATH LAB     CV HEART BIOPSY N/A 8/13/2019    Procedure: CV HEART BIOPSY;  Surgeon: Jackson Patten MD;  Location:  HEART CARDIAC CATH LAB     CV HEART BIOPSY N/A 10/15/2019    Procedure: CV HEART BIOPSY;  Surgeon:  Santino Mckeon MD;  Location:  HEART CARDIAC CATH LAB     CV INTRA-AORTIC BALLOON PUMP INSERTION N/A 2/18/2019    Procedure: Intra-Aortic Balloon;  Surgeon: Alton Solomon MD;  Location:  HEART CARDIAC CATH LAB     CV INTRA-AORTIC BALLOON PUMP INSERTION N/A 2/20/2019    Procedure: Replace subclavian IABP;  Surgeon: Yves Rodrigues MD;  Location:  HEART CARDIAC CATH LAB     CV LEFT HEART CATH N/A 3/19/2019    Procedure: Left Heart Cath;  Surgeon: Yves Rodrigues MD;  Location:  HEART CARDIAC CATH LAB     CV RIGHT HEART CATH N/A 3/19/2019    Procedure: RHC/HBx - Femoral access for 3/19 per Nimisha GONZALEZ;  Surgeon: Yves Rodrigues MD;  Location:  HEART CARDIAC CATH LAB     CV RIGHT HEART CATH N/A 3/25/2019    Procedure: Right Heart Cath;  Surgeon: Yves Rodrigues MD;  Location:  HEART CARDIAC CATH LAB     CV RIGHT HEART CATH N/A 3/28/2019    Procedure: Heart Cath Right Heart Cath;  Surgeon: Yves Rodrigues MD;  Location:  HEART CARDIAC CATH LAB     CV RIGHT HEART CATH N/A 4/24/2019    Procedure: CV RIGHT HEART CATH;  Surgeon: Isiah Mcallister MD;  Location:  HEART CARDIAC CATH LAB     CV RIGHT HEART CATH N/A 3/11/2019    Procedure: ADD ON RHC/HBX;  Surgeon: Alton Solomon MD;  Location:  HEART CARDIAC CATH LAB     CV RIGHT HEART CATH N/A 6/4/2019    Procedure: CV RIGHT HEART CATH;  Surgeon: Alton Solomon MD;  Location:  HEART CARDIAC CATH LAB     CV RIGHT HEART CATH N/A 5/22/2019    Procedure: CV RIGHT HEART CATH;  Surgeon: Yvse Rodrigues MD;  Location:  HEART CARDIAC CATH LAB     CV RIGHT HEART CATH N/A 8/13/2019    Procedure: CV RIGHT HEART CATH;  Surgeon: Jackson Patten MD;  Location:  HEART CARDIAC CATH LAB     CV RIGHT HEART CATH N/A 10/15/2019    Procedure: CV RIGHT HEART CATH;  Surgeon: Santino Mckeon MD;  Location: [facility] HEART CARDIAC CATH LAB     INCISION AND DRAINAGE CHEST WASHOUT, COMBINED N/A 3/21/2019    Procedure:  Incision And Drainage; Evacuation Right Chest Wall Hematoma;  Surgeon: Dewayne House MD;  Location: [facility] OR     INSERT INTRAAORTIC BALLOON PUMP Left 2019    Procedure: Insert left  Subclavian Balloon Pump,  Removal Right femoral arterial balloom pump sheath;  Surgeon: Dewayne House MD;  Location: [facility] OR     INSERT INTRAAORTIC BALLOON PUMP Left 2019    Procedure: SUBCLAVIAN BALLOON PUMP PLACEMENT;  Surgeon: Ben White MD;  Location: [facility] OR     IR PICC PLACEMENT > 5 YRS OF AGE  2019     TRANSPLANT HEART RECIPIENT N/A 2019    Procedure: TRANSPLANT HEART RECIPIENT;  Surgeon: Dewayne House MD;  Location: [facility] OR       Family History   Problem Relation Age of Onset     Endometrial Cancer Mother      Hypertension Father      Endometrial Cancer Maternal Grandmother        Social History     Tobacco Use     Smoking status: Former Smoker     Packs/day: 0.00     Years: 10.00     Pack years: 0.00     Start date: 1980     Last attempt to quit: 2008     Years since quittin.7     Smokeless tobacco: Never Used   Substance Use Topics     Alcohol use: Not Currently     Alcohol/week: 1.0 standard drinks     Types: 1 Cans of beer per week         Current Outpatient Medications   Medication Sig     acetaminophen (TYLENOL) 325 MG tablet Take 2 tablets (650 mg) by mouth every 4 hours as needed for mild pain     amLODIPine (NORVASC) 5 MG tablet TAKE 1 TABLET BY MOUTH EVERY DAY     aspirin (ASA) 81 MG chewable tablet Take 1 tablet (81 mg) by mouth daily     calcium carbonate 600 mg-vitamin D 400 units (CALTRATE) 600-400 MG-UNIT per tablet Take 1 tablet by mouth 2 times daily (with meals)     cetirizine (ZYRTEC) 10 MG tablet Take 10 mg by mouth daily     DULoxetine (CYMBALTA) 20 MG EC capsule Take 20 mg by mouth daily     levothyroxine (SYNTHROID/LEVOTHROID) 100 MCG tablet Take 100 mcg by mouth daily      magnesium oxide (MAG-OX) 400 MG tablet Take 1 tablet (400 mg) by mouth 2 times  "daily     melatonin 3 MG tablet Take 2 tablets (6 mg) by mouth At Bedtime (Patient taking differently: Take 5 mg by mouth At Bedtime )     mesalamine (ASACOL HD) 800 MG EC tablet Take 1 tablet (800 mg) by mouth 3 times daily     multivitamin w/minerals (THERA-VIT-M) tablet Take 1 tablet by mouth daily     mycophenolate (GENERIC EQUIVALENT) 250 MG capsule Take 6 capsules (1,500 mg) by mouth 2 times daily     omeprazole (PRILOSEC) 40 MG DR capsule Take 1 capsule (40 mg) by mouth daily     ondansetron (ZOFRAN-ODT) 4 MG ODT tab DISSOLVE 1 TABLET ON THE TONGUE EVERY EIGHT HOURS AS NEEDED     polyethylene glycol (MIRALAX/GLYCOLAX) powder Take 1 capful by mouth daily     rosuvastatin (CRESTOR) 10 MG tablet Take 1 tablet (10 mg) by mouth daily     senna (SENOKOT) 8.6 MG tablet Take 2 tablets by mouth 2 times daily as needed for constipation     tacrolimus (GENERIC EQUIVALENT) 1 MG capsule Take 3 capsules (3 mg) in am and take 2 capsules (2 mg) in pm.     valGANciclovir (VALCYTE) 450 MG tablet Take 1 tablet (450 mg) by mouth daily     albuterol (PROAIR HFA/PROVENTIL HFA/VENTOLIN HFA) 108 (90 Base) MCG/ACT inhaler Inhale 2 puffs into the lungs every 6 hours as needed for shortness of breath / dyspnea or wheezing     fluticasone (FLOVENT HFA) 220 MCG/ACT Inhaler Inhale 2 puffs into the lungs 2 times daily     vancomycin (FIRVANQ) 50 MG/ML oral solution Take 2.5 mLs (125 mg) by mouth 4 times daily for 9 days (Patient not taking: Reported on 10/16/2019)     No current facility-administered medications for this visit.          ROS:   10 point ROS negative other than what is discussed above.    EXAM:   /74 (BP Location: Left arm, Patient Position: Chair, Cuff Size: Adult Regular)   Pulse 120   Ht 1.727 m (5' 8\")   Wt 60 kg (132 lb 4.8 oz)   SpO2 99%   BMI 20.12 kg/m     GENERAL: Alert, oriented, NAD  HEENT:  NC/AT. Sclerae white.  MMM, no oral lesions  NECK: JVP ~4 cm  HEART: RRR, normal S1 and S2, no murmurs. 2+ " bilateral peripheral pulses.  LUNGS:  Clear to auscultation bilaterally, no wheezes, rales, or rhonchi  ABDOMEN: Soft, nontender, nondistended, bowel sounds present, no ascites.  EXTREMITIES: No lower extremity edema.    Labs:    Recent Results (from the past 24 hour(s))   Soluble transferrin receptor    Collection Time: 10/15/19  8:35 AM   Result Value Ref Range    Soluble Transferrin Receptor 2.0 (L) 2.2 - 5.0 mg/L   Iron and iron binding capacity    Collection Time: 10/15/19  8:35 AM   Result Value Ref Range    Iron 114 35 - 180 ug/dL    Iron Binding Cap 304 240 - 430 ug/dL    Iron Saturation Index 38 15 - 46 %   Ferritin    Collection Time: 10/15/19  8:35 AM   Result Value Ref Range    Ferritin 171 26 - 388 ng/mL   Tacrolimus level    Collection Time: 10/15/19  8:35 AM   Result Value Ref Range    Tacrolimus Last Dose Not Provided     Tacrolimus Level 6.7 5.0 - 15.0 ug/L   Magnesium    Collection Time: 10/15/19  8:35 AM   Result Value Ref Range    Magnesium 1.8 1.6 - 2.3 mg/dL   Phosphorus    Collection Time: 10/15/19  8:35 AM   Result Value Ref Range    Phosphorus 4.2 2.5 - 4.5 mg/dL   CK total    Collection Time: 10/15/19  8:35 AM   Result Value Ref Range    CK Total 56 30 - 300 U/L   CBC with platelets    Collection Time: 10/15/19  8:35 AM   Result Value Ref Range    WBC 2.7 (L) 4.0 - 11.0 10e9/L    RBC Count 3.30 (L) 4.4 - 5.9 10e12/L    Hemoglobin 8.5 (L) 13.3 - 17.7 g/dL    Hematocrit 28.7 (L) 40.0 - 53.0 %    MCV 87 78 - 100 fl    MCH 25.8 (L) 26.5 - 33.0 pg    MCHC 29.6 (L) 31.5 - 36.5 g/dL    RDW 15.6 (H) 10.0 - 15.0 %    Platelet Count 360 150 - 450 10e9/L   Comprehensive metabolic panel    Collection Time: 10/15/19  8:35 AM   Result Value Ref Range    Sodium 139 133 - 144 mmol/L    Potassium 4.6 3.4 - 5.3 mmol/L    Chloride 107 94 - 109 mmol/L    Carbon Dioxide 24 20 - 32 mmol/L    Anion Gap 8 3 - 14 mmol/L    Glucose 82 70 - 99 mg/dL    Urea Nitrogen 31 (H) 7 - 30 mg/dL    Creatinine 1.20 0.66 - 1.25  mg/dL    GFR Estimate 67 >60 mL/min/[1.73_m2]    GFR Estimate If Black 78 >60 mL/min/[1.73_m2]    Calcium 9.1 8.5 - 10.1 mg/dL    Bilirubin Total 0.4 0.2 - 1.3 mg/dL    Albumin 3.6 3.4 - 5.0 g/dL    Protein Total 7.0 6.8 - 8.8 g/dL    Alkaline Phosphatase 230 (H) 40 - 150 U/L    ALT 29 0 - 70 U/L    AST 15 0 - 45 U/L   EKG 12-lead, tracing only    Collection Time: 10/15/19  9:24 AM   Result Value Ref Range    Interpretation ECG Click View Image link to view waveform and result          RHC and EMB 10/15/19:  Conclusion       Right sided filling pressures are normal.    Left sided filling pressures are normal.    Normal PA pressures.    Normal cardiac output level.    Successful collection of endomyocardial biopsies          Plan       Follow bedrest per protocol    Continued medical management and lifestyle modifications for cardiovascular risk factor optimizations.    Discharge today per protocol   Hemodynamics     Right Heart Pressures     Right sided filling pressures are normal.Left sided filling pressures are normal. Normal PA pressures.Normal cardiac output level.   Heart Biospy     Heart Biopsy     After informed consent patient was prepped and draped in the usual fashion. Under fluoroscopy guidance a 7 Fr angeled sheath was placed into the right internal jugular vein after local anesthesia with 1.0% lidocaine. 5.5 Danish Jawz bioptome was passed through the sheath to the right ventricular septum and 5 biopsies were obtained. The biopsy specimens were sent to Pathology for examination. The sheath was removed and hemostasis was obtained. The patient tolerated the procedure well and there were no complications.   Pressures Phase: Baseline      Time Systolic Diastolic Mean A Wave V Wave EDP Max dp/dt HR   RA Pressures 11:49 AM   2 mmHg    3 mmHg    4 mmHg      105 bpm      RV Pressures 11:35 AM       1776 mmHg/sec        11:49 AM 22 mmHg        4 mmHg     105 bpm      PA Pressures 11:50 AM 20 mmHg    10 mmHg     14 mmHg        105 bpm      PCW Pressures 11:49 AM   6 mmHg    7 mmHg    7 mmHg      105 bpm      Blood Flow Results Phase: Baseline      Time Results Indexed Values   QP 11:35 AM 5.92 L/min    3.66 L/min/m2      QS 11:35 AM 5.92 L/min    3.66 L/min/m2      Blood Oximetry Phase: Baseline      Time Hb SAT(%) PO2 Content PA Sat   PA 11:35 AM  66.6 %      66.6 %      Art 11:35 AM  100 %     10.74 mL/dL       Cardiac Output Phase: Baseline      Time TDCO TDCI Jaden C.O. Jaden C.I. Jaden HR   Cardiac Output Results 11:35 AM 6.03 L/min    3.74 L/min/m2    5.92 L/min    3.66 L/min/m2        11:51 AM 6.03 L/min          Resistance Results Phase: Baseline      Time PVR SVR PVR-I SVR-I TPR TVR TPR-I TVR-I PVR/SVR TPR/TVR   Resistance Results (Metric) 11:35 .64 dsc-5     172.25 dsc-5/m2     186.62 dsc-5     301.45 dsc-5/m2         Resistance Results (Wood) 11:35 AM 1.33 MARTIN     2.15 MARTIN/m2     2.33 MARTIN     3.77 MARTIN/m2         Stoke Volume Results Phase: Baseline      Time RVSW LVSW RVSW-I LVSW-I   Stroke Work Results 11:35 AM 9.33 gm*m     5.77 gm*m/m2             TTE 10/15/19:  Interpretation Summary  Left ventricular function, chamber size, wall motion, and wall thickness are  normal.The EF is 60-65%. No regional wall motion abnormalities are seen.  Right ventricular function, chamber size, wall motion, and thickness are  normal.  Trace tricuspid insufficiency is present. Pulmonary artery systolic pressure  is normal. The inferior vena cava was normal in size with preserved  respiratory variability. No pericardial effusion is present.     There has been no change.        Assessment and Plan:   Everton Larios is a 54 year old male with a history of ASD (s/p repair in childhood), AFib/AF (s/p ablation), NICM, diastolic dysfunction, alcohol abuse, Pierce's esophagus, ulcerative colitis, GIB (s/p splenic embolization), and hypothyroidism, now s/p OHT and bypass of persistent left SVC to right atrial appendage 2/24/19 who  presents to clinic for routine surveillance. This is his 8 month post-transplant visit.    NICM, s/p OHT and bypass of persistent left SVC to right atrial appendage 2/24/19  His postoperative course was c/b shock due to RV dysfunction requiring IABP support, small pneumoperitoneum (clinically insignificant), chest wall hematoma (former ICD site, s/p evacuation and drain placement 3/31/19), HCAP/klebsiella pneumonia, and FRANKLIN (required short-term HD, now recovered).  He was transferred to  rehab 4/3, and ultimately discharged to his local residence 4/15.     His biopsy 3/25/19 showed mild AMR1 with some staining for c4d.  Repeat biopsy 3/28 showed improved AMR and less reactive capillary staining, and subsequent biopsies have now shown resolution of AMR and improved capillary staining.  Baseline coronary angiogram was previously deferred due to renal function.  His last echocardiogram 10/15/19 showed stable graft function with LVEF 60-65%, normal RV function, and trace TR.     He had a fall in early April, and was found to have a compression fracture.     He was hospitalized 4/28-5/9 with fevers, URI symptoms, abdominal pain, and diarrhea, and was found to be neutropenic with pseudomonas bacteremia and HCAP.  Chest CT showed diffuse bilateral solid with peripheral groundglass pulmonary nodules/opacities and mediastinal adenopathy, BAL grew pseudomonas, and fungitell was positive.  Transplant ID was consulted, and he was treated with 4 weeks of IV cefepime and micafungin.  Repeat CT 5/22 showed significantly decreased bilateral nodular and consolidative opacities.     He represented to [facility] 5/24 with FRANKLIN (creatinine 3.43), thought to be secondary to poor intake and self-adjusting diuretics.  He was given 2L NS and 1L LR, with creatinine trending down upon discharge 5/26.    He was recently hospitalized from 9/23-9/28/19 with CMV viremia and C diff colitis. Had diarrhea. 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 Did not have  a colonoscopy.     Labs today show stable electrolytes, improved kidney function, and stable blood counts. Iron panel did not show iron deficiency anemia. Tacrolimus level 6.7 yesterday (goal 6-8). TTE yesterday showed stable graft function with LVEF 60-65%.     Mr. Larios appears well today.  He appears euvolemic and hemodynamics from yesterday confirm that he is euvolemic.          Serostatus:  - CMV D+/R+  - EBV D+/R+  - Toxo D-/R-     Immunosuppression:  - tacrolimus, goal level 6-8.  Tacrolimus level 6.7 yesterday. No change in dose  - MMF 1500mg twice daily  - Biopsy from 10/15/19 pending     Graft function:  - BPs:  Stable, remain <140/90.  Continue amlodipine 5mg daily.  - HRs:  Stable, remain >80  - fluid status:  Euvolemic, off diuretics.  Discussed adequate po intake.      PPx:  - CAV:  Aspirin 81mg daily and rosuvastatin 10mg daily  - PCP:  pentamidine   - GERD:  Omeprazole (note h/o carrera's esophagus)  - CMV:  History of CMV viremia. Patient is unable to afford valcyte. Will discuss Dr. Shukla that patient will no longer be taking valcyte. Will check CMV levels weekly  - Osteoporosis:  Calcium/vitamin D supplements     Health maintenance:  - Will continue to talk to SW/company regarding financial assistance for Valcyte  - continue PT exercises and CR        Right chest wall hematoma  SVC grafting  Hematoma developed at former ICD site, and he is now s/p evacuation 3/31. SVC graft reportedly patent, so will need to monitor for increased swelling in left upper extremity.      Leukopenia, resolved  Neutropenia, resolved  CBC stable, will continue to trend.     RTC for 10 month follow-up with echocardiogram and labs including Allomap. Will plan for patient to get a coronary angiogram at 1 year follow-up.       Patient seen and discussed with Dr. Donis.      Candi Carlson MD, PhD  Cardiology Fellow  [phone]      I have reviewed today's vital signs, notes, medications, labs and imaging. I have also  seen and examined the patient and agree with the findings and plan as outlined above.    Corinna Donis MD  Section Head - Advanced Heart Failure, Transplantation and Mechanical Circulatory Support  Director - [facility]  Associate Professor of Medicine, [facility]  Patient Care Team:  Fredrick Garcia as PCP - General (Internal Medicine)  Jocelynn Jerez RN as Transplant Coordinator  Ric Shukla MD as MD (Infectious Diseases)  Agata Weathers MD as MD (INTERNAL MEDICINE - ENDOCRINOLOGY, DIABETES & METABOLISM)  FREDRICK GARCIA

## 2019-10-16 NOTE — NURSING NOTE
Chief Complaint   Patient presents with     Follow Up     8 month follow up      Vitals were taken and medications were reconciled.     Indira Merritt RMA  7:54 AM

## 2019-10-16 NOTE — LETTER
10/16/2019      RE: Everton Larios  [address] [id number]       Dear Colleague,    Thank you for the opportunity to participate in the care of your patient, Everton Larios, at the [facility]. Please see a copy of my visit note below.       Advanced Heart Failure and Transplant Clinic       HPI:  Everton Larios is a 54 year old male with a history of ASD (s/p repair in childhood), AFib/AF (s/p ablation), NICM, diastolic dysfunction, alcohol abuse, Pierce's esophagus, ulcerative colitis, GIB (s/p splenic embolization), and hypothyroidism, now s/p OHT and bypass of persistent left SVC to right atrial appendage 2/24/19 who presents to clinic for routine surveillance. This is his 8 month post-transplant visit.    His postoperative course was c/b shock due to RV dysfunction requiring IABP support, small pneumoperitoneum (clinically insignificant), chest wall hematoma (former ICD site, s/p evacuation and drain placement 3/31/19), HCAP/klebsiella pneumonia, and FRANKLIN (required short-term HD, now recovered).  He was transferred to  rehab 4/3, and ultimately discharged to his local residence 4/15.     His biopsy 3/25/19 showed mild AMR1 with some staining for c4d.  Repeat biopsy 3/28 showed improved AMR and less reactive capillary staining, and subsequent biopsies have now shown resolution of AMR and improved capillary staining.  Baseline coronary angiogram was previously deferred due to renal function.  His last echocardiogram 10/15/19 showed stable graft function with LVEF 60-65%, normal RV function, and trace TR.     He had a fall in early April, and was found to have a compression fracture.     He was hospitalized 4/28-5/9 with fevers, URI symptoms, abdominal pain, and diarrhea, and was found to be neutropenic with pseudomonas bacteremia and HCAP.  Chest CT showed diffuse bilateral solid with peripheral groundglass pulmonary  nodules/opacities and mediastinal adenopathy, BAL grew pseudomonas, and fungitell was positive.  Transplant ID was consulted, and he was treated with 4 weeks of IV cefepime and micafungin.  Repeat CT 5/22 showed significantly decreased bilateral nodular and consolidative opacities.     He represented to [facility] 5/24 with FRANKLIN (creatinine 3.43), thought to be secondary to poor intake and self-adjusting diuretics.  He was given 2L NS and 1L LR, with creatinine trending down upon discharge 5/26.    He was recently hospitalized from 9/23-9/28/19 with CMV viremia and C diff colitis. Had diarrhea. 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 Did not have a colonoscopy.    Patient notes feeling overall well. Diarrhea resolved 1.5 weeks ago. Denies fevers. Has constant chills. Denies shortness of breath, orthopnea, and PND. Occasionally has swelling in ankles after prolonged standing at work. Has chronic intermittent left-sided chest pain that occurs at rest and lasts for 10 minutes. The chest pain has been present for years and started prior to his transplant. His abdominal pain has improved. Sometimes has abdominal discomfort after eating Denies nausea and emesis. Denies hematochezia, melena, dysuria, and hematuria.    He is frustrated with his medical bills and notes that he will not be able to afford Valcyte. He only has 3 pills of Valcyte left. He saw Dr. Shukla yesterday and ideally, he would like patient to be on Valcyte 450 mg BID or 900 mg every day but patient is having significant financial strain. Would like CMV to be checked weekly. Also, patient is upset that he was not informed in advance that he needed a ride in order to obtain a coronary angiogram and that his angiogram was canceled yesterday.       Past Medical History:   Diagnosis Date     Alcohol abuse      Pierce's esophagus 10/4/2018     Chronic rhinitis 10/4/2018     Chronic systolic heart failure (H) 10/4/2018     Depression 10/4/2018     Diastolic dysfunction       DJD (degenerative joint disease) - neck 10/4/2018     H/O congenital atrial septal defect (ASD) repair at age 3 10/4/2018     Hypothyroidism due to medication 10/4/2018     ICD, [place] scientific 2008; gen change 2/2018 10/4/2018     Intermittent asthma without complication 10/4/2018     Nonischemic cardiomyopathy (H) 10/4/2018     On amiodarone therapy 10/4/2018     Paroxysmal atrial fibrillation (H) 10/4/2018     S/P ablation of atrial fibrillation 10/4/2018     Systolic heart failure (H)      Ulcerative colitis (H) 10/4/2018     Ventricular tachycardia (H) 10/4/2018       Past Surgical History:   Procedure Laterality Date     AICD, DUAL CHAMBER       BRONCHOSCOPY (RIGID OR FLEXIBLE), DIAGNOSTIC N/A 4/30/2019    Procedure: BRONCHOSCOPY, WITH BAL;  Surgeon: Margot Chiang MD;  Location:  GI     CV HEART BIOPSY N/A 3/4/2019    Procedure: Heart Biopsy;  Surgeon: Abhijeet Titus MD;  Location:  HEART CARDIAC CATH LAB     CV HEART BIOPSY N/A 3/25/2019    Procedure: Heart Biopsy;  Surgeon: Yves Rodrigues MD;  Location:  HEART CARDIAC CATH LAB     CV HEART BIOPSY N/A 3/28/2019    Procedure: Heart Cath Heart Biopsy;  Surgeon: Yves Rodrigues MD;  Location: [facility] HEART CARDIAC CATH LAB     CV HEART BIOPSY N/A 4/10/2019    Procedure: CV HEART BIOPSY;  Surgeon: Isiah Mcallister MD;  Location: [facility] HEART CARDIAC CATH LAB     CV HEART BIOPSY N/A 4/24/2019    Procedure: CV HEART BIOPSY;  Surgeon: Isiah Mcallister MD;  Location:  HEART CARDIAC CATH LAB     CV HEART BIOPSY N/A 5/8/2019    Procedure: CV HEART BIOPSY;  Surgeon: Isiah Mcallister MD;  Location: [facility] HEART CARDIAC CATH LAB     CV HEART BIOPSY N/A 6/4/2019    Procedure: CV HEART BIOPSY;  Surgeon: Alton Solomon MD;  Location:  HEART CARDIAC CATH LAB     CV HEART BIOPSY N/A 5/22/2019    Procedure: CV HEART BIOPSY;  Surgeon: Yves Rodrigues MD;  Location:  HEART CARDIAC CATH LAB     CV HEART BIOPSY N/A 7/17/2019     Procedure: CV HEART BIOPSY;  Surgeon: Isiah Mcallister MD;  Location: [facility] HEART CARDIAC CATH LAB     CV HEART BIOPSY N/A 8/13/2019    Procedure: CV HEART BIOPSY;  Surgeon: Jackson Patten MD;  Location: [facility] HEART CARDIAC CATH LAB     CV HEART BIOPSY N/A 10/15/2019    Procedure: CV HEART BIOPSY;  Surgeon: Santino Mckeon MD;  Location:  HEART CARDIAC CATH LAB     CV INTRA-AORTIC BALLOON PUMP INSERTION N/A 2/18/2019    Procedure: Intra-Aortic Balloon;  Surgeon: Alton Solomon MD;  Location:  HEART CARDIAC CATH LAB     CV INTRA-AORTIC BALLOON PUMP INSERTION N/A 2/20/2019    Procedure: Replace subclavian IABP;  Surgeon: Yves Rodrigues MD;  Location:  HEART CARDIAC CATH LAB     CV LEFT HEART CATH N/A 3/19/2019    Procedure: Left Heart Cath;  Surgeon: Yves Rodrigues MD;  Location:  HEART CARDIAC CATH LAB     CV RIGHT HEART CATH N/A 3/19/2019    Procedure: RHC/HBx - Femoral access for 3/19 per Nimisha GONZALEZ;  Surgeon: Yves Rodrigues MD;  Location:  HEART CARDIAC CATH LAB     CV RIGHT HEART CATH N/A 3/25/2019    Procedure: Right Heart Cath;  Surgeon: Yves Rodrigues MD;  Location:  HEART CARDIAC CATH LAB     CV RIGHT HEART CATH N/A 3/28/2019    Procedure: Heart Cath Right Heart Cath;  Surgeon: Yves Rodrigues MD;  Location:  HEART CARDIAC CATH LAB     CV RIGHT HEART CATH N/A 4/24/2019    Procedure: CV RIGHT HEART CATH;  Surgeon: Isiah Mcallister MD;  Location:  HEART CARDIAC CATH LAB     CV RIGHT HEART CATH N/A 3/11/2019    Procedure: ADD ON RHC/HBX;  Surgeon: Alton Solomon MD;  Location:  HEART CARDIAC CATH LAB     CV RIGHT HEART CATH N/A 6/4/2019    Procedure: CV RIGHT HEART CATH;  Surgeon: Alton Solomon MD;  Location:  HEART CARDIAC CATH LAB     CV RIGHT HEART CATH N/A 5/22/2019    Procedure: CV RIGHT HEART CATH;  Surgeon: Yves Rodrigues MD;  Location:  HEART CARDIAC CATH LAB     CV RIGHT HEART CATH N/A 8/13/2019    Procedure: CV  RIGHT HEART CATH;  Surgeon: Jackson Patten MD;  Location:  HEART CARDIAC CATH LAB     CV RIGHT HEART CATH N/A 10/15/2019    Procedure: CV RIGHT HEART CATH;  Surgeon: Santino Mckeon MD;  Location:  HEART CARDIAC CATH LAB     INCISION AND DRAINAGE CHEST WASHOUT, COMBINED N/A 3/21/2019    Procedure: Incision And Drainage; Evacuation Right Chest Wall Hematoma;  Surgeon: Dewayne House MD;  Location: [facility] OR     INSERT INTRAAORTIC BALLOON PUMP Left 2019    Procedure: Insert left  Subclavian Balloon Pump,  Removal Right femoral arterial balloom pump sheath;  Surgeon: Dewayne House MD;  [facility]     INSERT INTRAAORTIC BALLOON PUMP Left 2019    Procedure: SUBCLAVIAN BALLOON PUMP PLACEMENT;  Surgeon: Ben White MD;  [facility]     IR PICC PLACEMENT > 5 YRS OF AGE  2019     TRANSPLANT HEART RECIPIENT N/A 2019    Procedure: TRANSPLANT HEART RECIPIENT;  Surgeon: Dewayne House MD;  [facility]: [facility]       Family History   Problem Relation Age of Onset     Endometrial Cancer Mother      Hypertension Father      Endometrial Cancer Maternal Grandmother        Social History     Tobacco Use     Smoking status: Former Smoker     Packs/day: 0.00     Years: 10.00     Pack years: 0.00     Start date: 1980     Last attempt to quit: 2008     Years since quittin.7     Smokeless tobacco: Never Used   Substance Use Topics     Alcohol use: Not Currently     Alcohol/week: 1.0 standard drinks     Types: 1 Cans of beer per week         Current Outpatient Medications   Medication Sig     acetaminophen (TYLENOL) 325 MG tablet Take 2 tablets (650 mg) by mouth every 4 hours as needed for mild pain     amLODIPine (NORVASC) 5 MG tablet TAKE 1 TABLET BY MOUTH EVERY DAY     aspirin (ASA) 81 MG chewable tablet Take 1 tablet (81 mg) by mouth daily     calcium carbonate 600 mg-vitamin D 400 units (CALTRATE) 600-400 MG-UNIT per tablet Take 1 tablet by mouth 2 times  daily (with meals)     cetirizine (ZYRTEC) 10 MG tablet Take 10 mg by mouth daily     DULoxetine (CYMBALTA) 20 MG EC capsule Take 20 mg by mouth daily     levothyroxine (SYNTHROID/LEVOTHROID) 100 MCG tablet Take 100 mcg by mouth daily      magnesium oxide (MAG-OX) 400 MG tablet Take 1 tablet (400 mg) by mouth 2 times daily     melatonin 3 MG tablet Take 2 tablets (6 mg) by mouth At Bedtime (Patient taking differently: Take 5 mg by mouth At Bedtime )     mesalamine (ASACOL HD) 800 MG EC tablet Take 1 tablet (800 mg) by mouth 3 times daily     multivitamin w/minerals (THERA-VIT-M) tablet Take 1 tablet by mouth daily     mycophenolate (GENERIC EQUIVALENT) 250 MG capsule Take 6 capsules (1,500 mg) by mouth 2 times daily     omeprazole (PRILOSEC) 40 MG DR capsule Take 1 capsule (40 mg) by mouth daily     ondansetron (ZOFRAN-ODT) 4 MG ODT tab DISSOLVE 1 TABLET ON THE TONGUE EVERY EIGHT HOURS AS NEEDED     polyethylene glycol (MIRALAX/GLYCOLAX) powder Take 1 capful by mouth daily     rosuvastatin (CRESTOR) 10 MG tablet Take 1 tablet (10 mg) by mouth daily     senna (SENOKOT) 8.6 MG tablet Take 2 tablets by mouth 2 times daily as needed for constipation     tacrolimus (GENERIC EQUIVALENT) 1 MG capsule Take 3 capsules (3 mg) in am and take 2 capsules (2 mg) in pm.     valGANciclovir (VALCYTE) 450 MG tablet Take 1 tablet (450 mg) by mouth daily     albuterol (PROAIR HFA/PROVENTIL HFA/VENTOLIN HFA) 108 (90 Base) MCG/ACT inhaler Inhale 2 puffs into the lungs every 6 hours as needed for shortness of breath / dyspnea or wheezing     fluticasone (FLOVENT HFA) 220 MCG/ACT Inhaler Inhale 2 puffs into the lungs 2 times daily     vancomycin (FIRVANQ) 50 MG/ML oral solution Take 2.5 mLs (125 mg) by mouth 4 times daily for 9 days (Patient not taking: Reported on 10/16/2019)     No current facility-administered medications for this visit.          ROS:   10 point ROS negative other than what is discussed above.    EXAM:   /74 (BP  "Location: Left arm, Patient Position: Chair, Cuff Size: Adult Regular)   Pulse 120   Ht 1.727 m (5' 8\")   Wt 60 kg (132 lb 4.8 oz)   SpO2 99%   BMI 20.12 kg/m      GENERAL: Alert, oriented, NAD  HEENT:  NC/AT. Sclerae white.  MMM, no oral lesions  NECK: JVP ~4 cm  HEART: RRR, normal S1 and S2, no murmurs. 2+ bilateral peripheral pulses.  LUNGS:  Clear to auscultation bilaterally, no wheezes, rales, or rhonchi  ABDOMEN: Soft, nontender, nondistended, bowel sounds present, no ascites.  EXTREMITIES: No lower extremity edema.    Labs:    Recent Results (from the past 24 hour(s))   Soluble transferrin receptor    Collection Time: 10/15/19  8:35 AM   Result Value Ref Range    Soluble Transferrin Receptor 2.0 (L) 2.2 - 5.0 mg/L   Iron and iron binding capacity    Collection Time: 10/15/19  8:35 AM   Result Value Ref Range    Iron 114 35 - 180 ug/dL    Iron Binding Cap 304 240 - 430 ug/dL    Iron Saturation Index 38 15 - 46 %   Ferritin    Collection Time: 10/15/19  8:35 AM   Result Value Ref Range    Ferritin 171 26 - 388 ng/mL   Tacrolimus level    Collection Time: 10/15/19  8:35 AM   Result Value Ref Range    Tacrolimus Last Dose Not Provided     Tacrolimus Level 6.7 5.0 - 15.0 ug/L   Magnesium    Collection Time: 10/15/19  8:35 AM   Result Value Ref Range    Magnesium 1.8 1.6 - 2.3 mg/dL   Phosphorus    Collection Time: 10/15/19  8:35 AM   Result Value Ref Range    Phosphorus 4.2 2.5 - 4.5 mg/dL   CK total    Collection Time: 10/15/19  8:35 AM   Result Value Ref Range    CK Total 56 30 - 300 U/L   CBC with platelets    Collection Time: 10/15/19  8:35 AM   Result Value Ref Range    WBC 2.7 (L) 4.0 - 11.0 10e9/L    RBC Count 3.30 (L) 4.4 - 5.9 10e12/L    Hemoglobin 8.5 (L) 13.3 - 17.7 g/dL    Hematocrit 28.7 (L) 40.0 - 53.0 %    MCV 87 78 - 100 fl    MCH 25.8 (L) 26.5 - 33.0 pg    MCHC 29.6 (L) 31.5 - 36.5 g/dL    RDW 15.6 (H) 10.0 - 15.0 %    Platelet Count 360 150 - 450 10e9/L   Comprehensive metabolic panel    " Collection Time: 10/15/19  8:35 AM   Result Value Ref Range    Sodium 139 133 - 144 mmol/L    Potassium 4.6 3.4 - 5.3 mmol/L    Chloride 107 94 - 109 mmol/L    Carbon Dioxide 24 20 - 32 mmol/L    Anion Gap 8 3 - 14 mmol/L    Glucose 82 70 - 99 mg/dL    Urea Nitrogen 31 (H) 7 - 30 mg/dL    Creatinine 1.20 0.66 - 1.25 mg/dL    GFR Estimate 67 >60 mL/min/[1.73_m2]    GFR Estimate If Black 78 >60 mL/min/[1.73_m2]    Calcium 9.1 8.5 - 10.1 mg/dL    Bilirubin Total 0.4 0.2 - 1.3 mg/dL    Albumin 3.6 3.4 - 5.0 g/dL    Protein Total 7.0 6.8 - 8.8 g/dL    Alkaline Phosphatase 230 (H) 40 - 150 U/L    ALT 29 0 - 70 U/L    AST 15 0 - 45 U/L   EKG 12-lead, tracing only    Collection Time: 10/15/19  9:24 AM   Result Value Ref Range    Interpretation ECG Click View Image link to view waveform and result          RHC and EMB 10/15/19:  Conclusion       Right sided filling pressures are normal.    Left sided filling pressures are normal.    Normal PA pressures.    Normal cardiac output level.    Successful collection of endomyocardial biopsies          Plan       Follow bedrest per protocol    Continued medical management and lifestyle modifications for cardiovascular risk factor optimizations.    Discharge today per protocol   Hemodynamics     Right Heart Pressures     Right sided filling pressures are normal.Left sided filling pressures are normal. Normal PA pressures.Normal cardiac output level.   Heart Biospy     Heart Biopsy     After informed consent patient was prepped and draped in the usual fashion. Under fluoroscopy guidance a 7 Fr angeled sheath was placed into the right internal jugular vein after local anesthesia with 1.0% lidocaine. 5.5 [place] Jawz bioptome was passed through the sheath to the right ventricular septum and 5 biopsies were obtained. The biopsy specimens were sent to Pathology for examination. The sheath was removed and hemostasis was obtained. The patient tolerated the procedure well and there were no  complications.   Pressures Phase: Baseline      Time Systolic Diastolic Mean A Wave V Wave EDP Max dp/dt HR   RA Pressures 11:49 AM   2 mmHg    3 mmHg    4 mmHg      105 bpm      RV Pressures 11:35 AM       1776 mmHg/sec        11:49 AM 22 mmHg        4 mmHg     105 bpm      PA Pressures 11:50 AM 20 mmHg    10 mmHg    14 mmHg        105 bpm      PCW Pressures 11:49 AM   6 mmHg    7 mmHg    7 mmHg      105 bpm      Blood Flow Results Phase: Baseline      Time Results Indexed Values   QP 11:35 AM 5.92 L/min    3.66 L/min/m2      QS 11:35 AM 5.92 L/min    3.66 L/min/m2      Blood Oximetry Phase: Baseline      Time Hb SAT(%) PO2 Content PA Sat   PA 11:35 AM  66.6 %      66.6 %      Art 11:35 AM  100 %     10.74 mL/dL       Cardiac Output Phase: Baseline      Time TDCO TDCI Jaden C.O. Jaden C.I. Jaden HR   Cardiac Output Results 11:35 AM 6.03 L/min    3.74 L/min/m2    5.92 L/min    3.66 L/min/m2        11:51 AM 6.03 L/min          Resistance Results Phase: Baseline      Time PVR SVR PVR-I SVR-I TPR TVR TPR-I TVR-I PVR/SVR TPR/TVR   Resistance Results (Metric) 11:35 .64 dsc-5     172.25 dsc-5/m2     186.62 dsc-5     301.45 dsc-5/m2         Resistance Results (Wood) 11:35 AM 1.33 MARTIN     2.15 MARTIN/m2     2.33 MARTIN     3.77 MARTIN/m2         Stoke Volume Results Phase: Baseline      Time RVSW LVSW RVSW-I LVSW-I   Stroke Work Results 11:35 AM 9.33 gm*m     5.77 gm*m/m2             TTE 10/15/19:  Interpretation Summary  Left ventricular function, chamber size, wall motion, and wall thickness are  normal.The EF is 60-65%. No regional wall motion abnormalities are seen.  Right ventricular function, chamber size, wall motion, and thickness are  normal.  Trace tricuspid insufficiency is present. Pulmonary artery systolic pressure  is normal. The inferior vena cava was normal in size with preserved  respiratory variability. No pericardial effusion is present.     There has been no change.        Assessment and Plan:   Everton Mayo  Ric is a 54 year old male with a history of ASD (s/p repair in childhood), AFib/AF (s/p ablation), NICM, diastolic dysfunction, alcohol abuse, Pierce's esophagus, ulcerative colitis, GIB (s/p splenic embolization), and hypothyroidism, now s/p OHT and bypass of persistent left SVC to right atrial appendage 2/24/19 who presents to clinic for routine surveillance. This is his 8 month post-transplant visit.    NICM, s/p OHT and bypass of persistent left SVC to right atrial appendage 2/24/19  His postoperative course was c/b shock due to RV dysfunction requiring IABP support, small pneumoperitoneum (clinically insignificant), chest wall hematoma (former ICD site, s/p evacuation and drain placement 3/31/19), HCAP/klebsiella pneumonia, and FRANKLIN (required short-term HD, now recovered).  He was transferred to  rehab 4/3, and ultimately discharged to his local residence 4/15.     His biopsy 3/25/19 showed mild AMR1 with some staining for c4d.  Repeat biopsy 3/28 showed improved AMR and less reactive capillary staining, and subsequent biopsies have now shown resolution of AMR and improved capillary staining.  Baseline coronary angiogram was previously deferred due to renal function.  His last echocardiogram 10/15/19 showed stable graft function with LVEF 60-65%, normal RV function, and trace TR.     He had a fall in early April, and was found to have a compression fracture.     He was hospitalized 4/28-5/9 with fevers, URI symptoms, abdominal pain, and diarrhea, and was found to be neutropenic with pseudomonas bacteremia and HCAP.  Chest CT showed diffuse bilateral solid with peripheral groundglass pulmonary nodules/opacities and mediastinal adenopathy, BAL grew pseudomonas, and fungitell was positive.  Transplant ID was consulted, and he was treated with 4 weeks of IV cefepime and micafungin.  Repeat CT 5/22 showed significantly decreased bilateral nodular and consolidative opacities.     He represented to [facility] 5/24 with  FRANKLIN (creatinine 3.43), thought to be secondary to poor intake and self-adjusting diuretics.  He was given 2L NS and 1L LR, with creatinine trending down upon discharge 5/26.    He was recently hospitalized from 9/23-9/28/19 with CMV viremia and C diff colitis. Had diarrhea. 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 Did not have a colonoscopy.     Labs today show stable electrolytes, improved kidney function, and stable blood counts. Iron panel did not show iron deficiency anemia. Tacrolimus level 6.7 yesterday (goal 6-8). TTE yesterday showed stable graft function with LVEF 60-65%.     Mr. Larios appears well today.   He appears euvolemic and hemodynamics from yesterday confirm that he is euvolemic.          Serostatus:  - CMV D+/R+  - EBV D+/R+  - Toxo D-/R-     Immunosuppression:  - tacrolimus, goal level 6-8.  Tacrolimus level 6.7 yesterday. No change in dose  - MMF 1500mg twice daily  - Biopsy from 10/15/19 pending     Graft function:  - BPs:  Stable, remain <140/90.  Continue amlodipine 5mg daily.  - HRs:  Stable, remain >80  - fluid status:  Euvolemic, off diuretics.  Discussed adequate po intake.      PPx:  - CAV:  Aspirin 81mg daily and rosuvastatin 10mg daily  - PCP:  pentamidine   - GERD:  Omeprazole (note h/o carrera's esophagus)  - CMV:  History of CMV viremia. Patient is unable to afford valcyte. Will discuss Dr. Shukla that patient will no longer be taking valcyte. Will check CMV levels weekly  - Osteoporosis:  Calcium/vitamin D supplements     Health maintenance:  - Will continue to talk to SW/company regarding financial assistance for Valcyte  - continue PT exercises and CR        Right chest wall hematoma  SVC grafting  Hematoma developed at former ICD site, and he is now s/p evacuation 3/31. SVC graft reportedly patent, so will need to monitor for increased swelling in left upper extremity.      Leukopenia, resolved  Neutropenia, resolved  CBC stable, will continue to trend.     RTC for 10 month follow-up  with echocardiogram and labs including Allomap. Will plan for patient to get a coronary angiogram at 1 year follow-up.       Patient seen and discussed with Dr. Donis.      Candi Carlson MD, PhD  Cardiology Fellow  [phone]      I have reviewed today's vital signs, notes, medications, labs and imaging. I have also seen and examined the patient and agree with the findings and plan as outlined above.    Corinna Donis MD  Section Head - Advanced Heart Failure, Transplantation and Mechanical Circulatory Support  Director - Adult Congenital and Cardiovascular Genetics [facility]  Associate Professor of Medicine, [facility]  Patient Care Team:  Fredrick Garcia as PCP - General (Internal Medicine)  Jocelynn Jerez, RN as Transplant Coordinator  Ric Shukla MD as MD (Infectious Diseases)  Agata Weathers MD as MD (INTERNAL MEDICINE - ENDOCRINOLOGY, DIABETES & METABOLISM)  FREDRICK GARCIA

## 2019-10-16 NOTE — TELEPHONE ENCOUNTER
"Writer received call from  stating patient left a voicemail regarding Valcyte patient assistance program. Writer ran test claim, which came back as \"Refill Too Soon\" and indicated he had filled the valganciclovir at [facility] on 10/10/19, next fill available 10/24/19. Writer called [facility] and confirmed that patient had 60 tablets (30 day supply) filled on 10/10, for $200+ co-pay. Writer also called patient's PBM, [facility], to have them run a test claim for the date of 10/24/19, which came back at a $144 co-pay.     Writer called patient and left voicemail requesting a call back to discuss 2018 financials. Writer will send application to [facility] once all demographic information is obtained. Should the patient be ineligible for [employer], writer will contact Patient  to have them discuss alternative options to pay for the valganciclovir.       "

## 2019-10-16 NOTE — PATIENT INSTRUCTIONS
Patient Instructions  1. Weekly CMV levels per Dr. Shukla. Standing orders in our system and lab letter provided.   2. We will do angio at your annual appointment in Feb. Plan on having a .   3. No biopsy needed at 10 month. Echo and labs.   4. Please contact me if diarrhea comes back.     Next transplant clinic appointment:  10 month w/ echo, just allomap, and labs.   Next lab draw: weekly.   Coordinator will call with all pending results.     Please call transplant coordinator with any questions:    Jocelynn Jerez RN BSN   Post Heart Transplant Nurse Coordinator  [facility]  Questions: [phone]

## 2019-10-16 NOTE — LETTER
2019    Patient Name: Everton Larios   :  1965   MRN: [id number]  ICD10:  z94.1 , z79.899    Subject: Request for Labs    Everton Larios is a heart transplant patient currently being followed by the [facility].  We would like to request your assistance in obtaining the following laboratory tests which are part of our routine surveillance program for heart transplant recipients.  (Items needed are checked.)    Please fax the lab results as soon as they are available to:   Thoracic Transplant Department  Fax Number:  [phone]     Item Frequency   x CMV weekly     Thank you  for your continued support and the opportunity to collaborate in the care of this patient.  If you have any questions, please call the Thoracic Transplant Team at [phone] or [phone].    .

## 2019-10-17 ENCOUNTER — TELEPHONE (OUTPATIENT)
Dept: INFECTIOUS DISEASES | Facility: CLINIC | Age: 54
End: 2019-10-17

## 2019-10-17 LAB
CMV DNA SPEC NAA+PROBE-ACNC: <137 [IU]/ML
CMV DNA SPEC NAA+PROBE-LOG#: <2.1 {LOG_IU}/ML
IMMUKNOW IMMUNE CELL FUNCTION: 238 ATP NG/ML
SPECIMEN SOURCE: ABNORMAL

## 2019-10-17 NOTE — RESULT ENCOUNTER NOTE
isatu 238. No changes at this time. CMV <137. Dr. Shukla aware. Pt will stop valctye and we will continue to check weekly CMV levels.

## 2019-10-17 NOTE — TELEPHONE ENCOUNTER
CMV VL is now <137 international unit(s)/mL. Ideally we should continue one more week of induction therapy till we confirm that VL is still <137 international unit(s)/mL, however, given the patient's special circumstances and financial strain, we can stop valcyte, monitor CMV PCR weekly.   I was informed that he might be eligible for an assistance program. I think the patient should continue to pursue this option given that CMV is known to recur and he might need to go back on VGCV in the future.     This was discussed with Dr. Donis and the patient over the phone.   I also left a voicemail to Ms. Jocelynn Way, OHT coordinator.     Ric Shukla MD

## 2019-10-20 DIAGNOSIS — Z94.1 HEART REPLACED BY TRANSPLANT (H): ICD-10-CM

## 2019-10-21 ENCOUNTER — TELEPHONE (OUTPATIENT)
Dept: CARE COORDINATION | Facility: CLINIC | Age: 54
End: 2019-10-21

## 2019-10-21 ENCOUNTER — TELEPHONE (OUTPATIENT)
Dept: TRANSPLANT | Facility: CLINIC | Age: 54
End: 2019-10-21

## 2019-10-21 DIAGNOSIS — Z94.1 TRANSPLANTED HEART (H): Primary | ICD-10-CM

## 2019-10-21 NOTE — TELEPHONE ENCOUNTER
Transplant Social Work Services Phone Call      Data: Heart Transplant  called pt per his request to address donor letter and financial concerns with Valcyte. Referral has been initiated to transplant pharmacy liaison to f/u with regarding Valcyte Patient Assistance Program.   A few weeks ago writer had contacted pt to inform him that our center had received a letter from his donor to give to him. At that time, pt had declined the donor letter as he was not in a good emotional space to receive it. Writer wants to check-in with pt before sending it to assess coping and offer emotional support.     Intervention: Supportive Visit     Assessment: Writer left pt  ([phone]) with writer's contact information. Writer is aware that pt has been having significant financial struggles following transplant. Writer has assisted pt in paying some of his bills. Pt has utilized all funds from the [employer], but does have some funds left through the [employer].   Education provided by SW: Ongoing Social Work support  Plan:  SW awaiting return call from pt to further assess need for financial assistance and assess coping around receiving donor letter.

## 2019-10-21 NOTE — TELEPHONE ENCOUNTER
Pt called to get upcoming appointments scheduled for 10 month post heart transplant follow up. Pt needs echo, labs, and provider appointment. No answer. VM left.

## 2019-10-22 ENCOUNTER — OFFICE VISIT (OUTPATIENT)
Dept: ENDOCRINOLOGY | Facility: CLINIC | Age: 54
End: 2019-10-22
Payer: COMMERCIAL

## 2019-10-22 ENCOUNTER — TELEPHONE (OUTPATIENT)
Dept: TRANSPLANT | Facility: CLINIC | Age: 54
End: 2019-10-22

## 2019-10-22 ENCOUNTER — PRE VISIT (OUTPATIENT)
Dept: ENDOCRINOLOGY | Facility: CLINIC | Age: 54
End: 2019-10-22

## 2019-10-22 ENCOUNTER — RECORDS - HEALTHEAST (OUTPATIENT)
Dept: ADMINISTRATIVE | Facility: OTHER | Age: 54
End: 2019-10-22

## 2019-10-22 VITALS
WEIGHT: 134 LBS | HEIGHT: 68 IN | DIASTOLIC BLOOD PRESSURE: 76 MMHG | BODY MASS INDEX: 20.31 KG/M2 | SYSTOLIC BLOOD PRESSURE: 124 MMHG | HEART RATE: 118 BPM

## 2019-10-22 DIAGNOSIS — R79.89 LOW TSH LEVEL: ICD-10-CM

## 2019-10-22 DIAGNOSIS — M80.00XD AGE-RELATED OSTEOPOROSIS WITH CURRENT PATHOLOGICAL FRACTURE WITH ROUTINE HEALING, SUBSEQUENT ENCOUNTER: ICD-10-CM

## 2019-10-22 DIAGNOSIS — Z92.241 HISTORY OF CORTICOSTEROID THERAPY: ICD-10-CM

## 2019-10-22 DIAGNOSIS — E03.2 HYPOTHYROIDISM DUE TO MEDICATION: Primary | Chronic | ICD-10-CM

## 2019-10-22 DIAGNOSIS — Z94.1 HEART REPLACED BY TRANSPLANT (H): ICD-10-CM

## 2019-10-22 DIAGNOSIS — E03.2 HYPOTHYROIDISM DUE TO MEDICATION: Chronic | ICD-10-CM

## 2019-10-22 LAB
T3 SERPL-MCNC: 126 NG/DL (ref 60–181)
T4 FREE SERPL-MCNC: 0.97 NG/DL (ref 0.76–1.46)
TSH SERPL DL<=0.005 MIU/L-ACNC: 0.02 MU/L (ref 0.4–4)

## 2019-10-22 ASSESSMENT — PAIN SCALES - GENERAL: PAINLEVEL: NO PAIN (0)

## 2019-10-22 ASSESSMENT — MIFFLIN-ST. JEOR: SCORE: 1412.32

## 2019-10-22 NOTE — PROGRESS NOTES
"Endocrine Consult note    Attending Assessment/Plan :     Osteoporosis with history of  L1 compression fracture..  Start alendronate 70 mg/week after clearing the ordered labs   Labs to include vitamin D   I have counseled him on calcium intake - see patient instructions - recommend 1000 mg/day through diet or supplements  I have counseled him on bone exercise.     Heart transplant recipient. He is on immunosuppressants now.      History of corticosteroid exposures associated with heart transplant and UC.     Low TSH on LT4 .  TFTs including T3.  Treat to normal target TSH.  Labs following appt show normal T3, free T4 and only low TSH.  Reduce LT4 dose by 1/2 tablet per week to 100 * 6.5/week, divided.  Repeat labs in 3 months (Feb, 2020)    CKD, stage 3 with eGFR down to the 40's.      Right superior subcm nodule seen on 1/19 carotid US.  I am noting this so I don't forget about ti.     Agata Weathers MD      Chief complaint:  Everton is a 54 year old male seen in consultation at the request of Elin Jensen NP for heart transplant for bone density.  I have reviewed Care Everywhere including [facility] lab reports, imaging reports and provider notes as indicated.        HISTORY OF PRESENT ILLNESS  DXA 5/7/18 [facility] showed lowest T-score -2.6 at the L2-L4 spine  DXA 2/15/19 ([facility]) showed lowest T-score -3.5 at the L2-L4 spine   He has had the following fracture:  Compression fracture of vertebral body April, 2019-  Easter Sunday - got knocked over by dog fall from standing height.   Maximum height 5'8\".  Height was measured today    His bone risks include the following:  Heart transplant 4/24/19;  This was associated with multiple high dose steroids.  He believes the Last steroid was ? June  He has never had kidney stones  He has been on Levothyroxine x about 7 years, started by Dr Wolff - he can't remember the circumstances.  He has been on the current dose \"quite a while\" > 1 year    Bone " medication:   Calcium + D once/day bid after eating; wtihin 2 hours of dinner--this is since out of the hospital  Mg once/day - he doesn't knwo what it is for  He is not using the flovent inhaler     As a child he drank milk  Currently his dietary calcium includes:   Milk very little-- only with cereal or in cooking  Yogurt - now and then  Ice cream - loves - almost daily  Cheese - loves -almost daily    Labs:  10/4/18 amiodarone started?  Today he states he has been off of it about one linh  10/11/18 TSH 15.44, free T4 1.11  10/30/18 TSH 13.55,   11/15/18 TSH 15.97  11/16/18 LT4 50, 75 mcg/day; 88 mcg/day  11/20/18 free T4 1.37  1/2/19 TSH 10.41, free T4 1.32  2/18/19 LT4 100 mcg/day  2/23/19: HgbA1c 5.7%  2/24/19 heart transplant   3/9/19: TSH 1.05  5/17/19: TSH 2.67  5/22/19 TSH 3.56  7/17/19: TSH 1.79  9/17/19: TSH 0.05, free T4 1.33  10/3/19: Ca 8.7, creatinine 1.61  10/15/19: Ca 9.1, phos 4.2, Mg 1.8, albumin 3.6, creatinine 1.2, alk phos 230,   10/22/19 25OHD 36- results followed appt, not discussed at appt.     Imaging  1/14/19 carotid US - incidental right thyroid nodule subcm  4/21/19 CT T and L spine ([facility]): compression L1 by 15-20%  4/29/19 CT CAP ([facility]): Compression T12 30-40% heigh tloss - new since 3/19/19,. Thyroid assymmetrical with left extending larger than right.   5/1/19 MRI T spine: Mild superior endplate compression L1       REVIEW OF SYSTEMS  Hasn't felt well all of 2019, except last weeks  Weight is increasing after losing a lot of weight to 114#   4 years ago was 180#; 170 most of adult life.    Cardiac: chest pain once in a while -at rest-  Respiratory: getting better at walking and climibng stairs, getting stronger  GI: Recent c diff and CMV; currently no diarrhea; no abdeominal pain  A lot of joint pain, ankelsl espeically, one hip, right wrist  Right shoulder needs surgery  No bone pain    Answers for HPI/ROS submitted by the patient on 10/16/2019   General Symptoms: Yes  Skin  Symptoms: No  HENT Symptoms: Yes  EYE SYMPTOMS: No  HEART SYMPTOMS: Yes  LUNG SYMPTOMS: No  INTESTINAL SYMPTOMS: Yes  URINARY SYMPTOMS: No  REPRODUCTIVE SYMPTOMS: Yes  SKELETAL SYMPTOMS: Yes  BLOOD SYMPTOMS: No  NERVOUS SYSTEM SYMPTOMS: No  MENTAL HEALTH SYMPTOMS: Yes  Fever: No  Loss of appetite: Yes  Weight loss: No  Weight gain: Yes  Fatigue: Yes  Night sweats: Yes  Chills: Yes  Increased stress: Yes  Excessive hunger: No  Excessive thirst: No  Feeling hot or cold when others believe the temperature is normal: Yes  Loss of height: No  Post-operative complications: No  Surgical site pain: No  Hallucinations: No  Change in or Loss of Energy: Yes  Hyperactivity: No  Confusion: No  Ear pain: No  Ear discharge: No  Hearing loss: No  Tinnitus: No  Nosebleeds: No  Congestion: Yes  Sinus pain: No  Trouble swallowing: No   Voice hoarseness: No  Mouth sores: No  Sore throat: No  Tooth pain: No  Gum tenderness: No  Bleeding gums: No  Change in taste: Yes  Change in sense of smell: No  Dry mouth: No  Hearing aid used: No  Neck lump: No  Chest pain or pressure: Yes  Fast or irregular heartbeat: No  Pain in legs with walking: Yes  Trouble breathing while lying down: No  Fingers or toes appear blue: No  High blood pressure: No  Low blood pressure: No  Fainting: No  Pacemaker: No  Varicose veins: Yes  Edema or swelling: No  Wake up at night with shortness of breath: No  Light-headedness: No  Exercise intolerance: No  Heart burn or indigestion: No  Nausea: No  Vomiting: No  Abdominal pain: No  Bloating: No  Constipation: Yes  Diarrhea: No  Blood in stool: No  Rectal or Anal pain: No  Fecal incontinence: No  Yellowing of skin or eyes: No  Vomit with blood: No  Change in stools: No  Back pain: Yes  Muscle aches: Yes  Neck pain: Yes  Swollen joints: No  Joint pain: Yes  Bone pain: No  Muscle cramps: Yes  Muscle weakness: No  Joint stiffness: Yes  Bone fracture: No  Scrotal pain or swelling: No  Erectile dysfunction: Yes  Penile  discharge: No  Genital ulcers: No  Reduced libido: Yes  Nervous or Anxious: Yes  Depression: Yes  Trouble sleeping: Yes  Trouble thinking or concentrating: No  Mood changes: Yes  Panic attacks: No      Past Medical History  Past Medical History:   Diagnosis Date     Alcohol abuse      Pierce's esophagus 10/4/2018     Chronic rhinitis 10/4/2018     Chronic systolic heart failure (H) 10/4/2018     Depression 10/4/2018     Diastolic dysfunction      DJD (degenerative joint disease) - neck 10/4/2018     H/O congenital atrial septal defect (ASD) repair at age 3 10/4/2018     Hypothyroidism due to medication 10/4/2018     ICD, [employer] 2008; gen change 2/2018 10/4/2018     Intermittent asthma without complication 10/4/2018     Nonischemic cardiomyopathy (H) 10/4/2018     On amiodarone therapy 10/4/2018     Paroxysmal atrial fibrillation (H) 10/4/2018     S/P ablation of atrial fibrillation 10/4/2018     Systolic heart failure (H)      Ulcerative colitis (H) 2005     Ventricular tachycardia (H) 10/4/2018     Past Surgical History:   Procedure Laterality Date     AICD, DUAL CHAMBER       BRONCHOSCOPY (RIGID OR FLEXIBLE), DIAGNOSTIC N/A 4/30/2019    Procedure: BRONCHOSCOPY, WITH BAL;  Surgeon: Margot Chiang MD;  Location:  GI     CV HEART BIOPSY N/A 3/4/2019    Procedure: Heart Biopsy;  Surgeon: Abhijeet Titus MD;  Location:  HEART CARDIAC CATH LAB     CV HEART BIOPSY N/A 3/25/2019    Procedure: Heart Biopsy;  Surgeon: Yves Rodrigues MD;  Location:  HEART CARDIAC CATH LAB     CV HEART BIOPSY N/A 3/28/2019    Procedure: Heart Cath Heart Biopsy;  Surgeon: Yves Rodrigues MD;  Location:  HEART CARDIAC CATH LAB     CV HEART BIOPSY N/A 4/10/2019    Procedure: CV HEART BIOPSY;  Surgeon: Isiah Mcallister MD;  Location:  HEART CARDIAC CATH LAB     CV HEART BIOPSY N/A 4/24/2019    Procedure: CV HEART BIOPSY;  Surgeon: Isiah Mcallister MD;  Location:  HEART CARDIAC CATH LAB     CV  HEART BIOPSY N/A 5/8/2019    Procedure: CV HEART BIOPSY;  Surgeon: Isiah Mcallister MD;  Location: [facility] CARD[facility] C[facility]     CV HEART BIOPSY N/A 6/4/2019    Procedure: CV HEART BIOPSY;  Surgeon: Alton Solomon MD;  Location: [facility] HE[facility] CARDIAC CATH LAB     CV HEART BIOPSY N/A 5/22/2019    Procedure: CV HEART BIOPSY;  Surgeon: Yves Rodrigues MD;  Location: [facility] HE[facility] CARDIAC CATH LAB     CV HEART BIOPSY N/A 7/17/2019    Procedure: CV HEART BIOPSY;  Surgeon: Isiah Mcallister MD;  Location: [facility] HEART CARDIAC CATH LAB     CV HEART BIOPSY N/A 8/13/2019    Procedure: CV HEART BIOPSY;  Surgeon: Jackson Patten MD;  Location:  HEART CARDIAC CATH LAB     CV HEART BIOPSY N/A 10/15/2019    Procedure: CV HEART BIOPSY;  Surgeon: Santino Mckeon MD;  Location:  HEART CARDIAC CATH LAB     CV INTRA-AORTIC BALLOON PUMP INSERTION N/A 2/18/2019    Procedure: Intra-Aortic Balloon;  Surgeon: Alton Solomon MD;  Location:  HEART CARDIAC CATH LAB     CV INTRA-AORTIC BALLOON PUMP INSERTION N/A 2/20/2019    Procedure: Replace subclavian IABP;  Surgeon: Yves Rodrigues MD;  Location:  HEART CARDIAC CATH LAB     CV LEFT HEART CATH N/A 3/19/2019    Procedure: Left Heart Cath;  Surgeon: Yves Rodrigues MD;  Location:  HEART CARDIAC CATH LAB     CV RIGHT HEART CATH N/A 3/19/2019    Procedure: RHC/HBx - Femoral access for 3/19 per Nimisha GONZALEZ;  Surgeon: Yves Rodrigues MD;  Location:  HEART CARDIAC CATH LAB     CV RIGHT HEART CATH N/A 3/25/2019    Procedure: Right Heart Cath;  Surgeon: Yves Rodrigues MD;  Location:  HEART CARDIAC CATH LAB     CV RIGHT HEART CATH N/A 3/28/2019    Procedure: Heart Cath Right Heart Cath;  Surgeon: Yves Rodrigues MD;  Location:  HEART CARDIAC CATH LAB     CV RIGHT HEART CATH N/A 4/24/2019    Procedure: CV RIGHT HEART CATH;  Surgeon: Isiah Mcallister MD;  Location:  HEART CARDIAC CATH LAB     CV RIGHT HEART CATH N/A 3/11/2019     Procedure: ADD ON RHC/HBX;  Surgeon: Alton Solomon MD;  Location:  HEART CARDIAC CATH LAB     CV RIGHT HEART CATH N/A 6/4/2019    Procedure: CV RIGHT HEART CATH;  Surgeon: Alton Solomon MD;  Location:  HEART CARDIAC CATH LAB     CV RIGHT HEART CATH N/A 5/22/2019    Procedure: CV RIGHT HEART CATH;  Surgeon: Yves Rodrigues MD;  Location:  HEART CARDIAC CATH LAB     CV RIGHT HEART CATH N/A 8/13/2019    Procedure: CV RIGHT HEART CATH;  Surgeon: Jackson Patten MD;  Location:  HEART CARDIAC CATH LAB     CV RIGHT HEART CATH N/A 10/15/2019    Procedure: CV RIGHT HEART CATH;  Surgeon: Santino Mckeon MD;  Location:  HEART CARDIAC CATH LAB     INCISION AND DRAINAGE CHEST WASHOUT, COMBINED N/A 3/21/2019    Procedure: Incision And Drainage; Evacuation Right Chest Wall Hematoma;  Surgeon: Dewayne House MD;  Location: [facility]     INSERT INTRAAORTIC BALLOON PUMP Left 2/19/2019    Procedure: Insert left  Subclavian Balloon Pump,  Removal Right femoral arterial balloom pump sheath;  Surgeon: Dewayne House MD;  [facility]     INSERT INTRAAORTIC BALLOON PUMP Left 2/21/2019    Procedure: SUBCLAVIAN BALLOON PUMP PLACEMENT;  Surgeon: Ben White MD;  Location: [facility] OR     IR PICC PLACEMENT > 5 YRS OF AGE  5/8/2019     TRANSPLANT HEART RECIPIENT N/A 2/24/2019    Procedure: TRANSPLANT HEART RECIPIENT;  Surgeon: Dewayne House MD;  Location: [facility] OR       Medications    Current Outpatient Medications   Medication Sig Dispense Refill     acetaminophen (TYLENOL) 325 MG tablet Take 2 tablets (650 mg) by mouth every 4 hours as needed for mild pain       albuterol (PROAIR HFA/PROVENTIL HFA/VENTOLIN HFA) 108 (90 Base) MCG/ACT inhaler Inhale 2 puffs into the lungs every 6 hours as needed for shortness of breath / dyspnea or wheezing       amLODIPine (NORVASC) 5 MG tablet TAKE 1 TABLET BY MOUTH EVERY DAY 30 tablet 2     aspirin (ASA) 81 MG chewable tablet Take 1 tablet  (81 mg) by mouth daily       calcium carbonate 600 mg-vitamin D 400 units (CALTRATE) 600-400 MG-UNIT per tablet Take 1 tablet by mouth 2 times daily (with meals)       cetirizine (ZYRTEC) 10 MG tablet Take 10 mg by mouth daily       DULoxetine (CYMBALTA) 20 MG EC capsule Take 20 mg by mouth daily       fluticasone (FLOVENT HFA) 220 MCG/ACT Inhaler Inhale 2 puffs into the lungs 2 times daily       levothyroxine (SYNTHROID/LEVOTHROID) 100 MCG tablet Take 100 mcg by mouth daily        magnesium oxide (MAG-OX) 400 MG tablet Take 1 tablet (400 mg) by mouth 2 times daily 180 tablet 3     melatonin 3 MG tablet Take 2 tablets (6 mg) by mouth At Bedtime (Patient taking differently: Take 5 mg by mouth At Bedtime )       mesalamine (ASACOL HD) 800 MG EC tablet Take 1 tablet (800 mg) by mouth 3 times daily 270 tablet 1     multivitamin w/minerals (THERA-VIT-M) tablet Take 1 tablet by mouth daily       mycophenolate (GENERIC EQUIVALENT) 250 MG capsule Take 6 capsules (1,500 mg) by mouth 2 times daily 360 capsule 11     omeprazole (PRILOSEC) 40 MG DR capsule Take 1 capsule (40 mg) by mouth daily       ondansetron (ZOFRAN-ODT) 4 MG ODT tab DISSOLVE 1 TABLET ON THE TONGUE EVERY EIGHT HOURS AS NEEDED  3     polyethylene glycol (MIRALAX/GLYCOLAX) powder Take 1 capful by mouth daily       rosuvastatin (CRESTOR) 10 MG tablet Take 1 tablet (10 mg) by mouth daily 90 tablet 1     senna (SENOKOT) 8.6 MG tablet Take 2 tablets by mouth 2 times daily as needed for constipation       tacrolimus (GENERIC EQUIVALENT) 1 MG capsule Take 3 capsules (3 mg) in am and take 2 capsules (2 mg) in pm. 30 capsule 3     valGANciclovir (VALCYTE) 450 MG tablet Take 1 tablet (450 mg) by mouth daily 60 tablet 2       Allergies  Allergies   Allergen Reactions     Hydromorphone Other (See Comments)     Significant Delirium     Adhesive Tape Blisters     Tegaderm causes bruises, blisters and extreme irritation.     Lisinopril Other (See Comments)     hypotension  "    Quinolones Other (See Comments)     Would not rx quinolones until QTc interval improved.      Tobramycin Other (See Comments)     Would not use aminoglycosides as his kidney function is very borderline     Codeine Nausea       Family History  family history includes Endometrial Cancer in his maternal grandmother and mother; Hypertension in his father; Osteoporosis in his mother.    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      Physical Exam  /76   Pulse 118   Ht 1.727 m (5' 8\")   Wt 60.8 kg (134 lb)   BMI 20.37 kg/m    Body mass index is 20.37 kg/m .  GENERAL :  Pleasant middle aged man In no apparent distress  SKIN: Normal color, normal temperature, texture.  No  purple striae.     EYES: PERRL, EOMI, No scleral icterus,  No proptosis, conjunctival redness, stare, retraction  MOUTH: Moist, pink; pharynx clear  NECK: No visible masses. No palpable adenopathy, or masses. No carotid bruits.   THYROID:  Palpable, poorly defined.    RESP: Lungs clear to auscultation bilaterally  CARDIAC: tachy, hyperdynamic;  Regular,  normal S1 S2, without murmurs, rubs or gallops    ABDOMEN: Normal bowel sounds; soft, nontender, no HSM or masses       NEURO: awake, alert, responds appropriately to questions.  Cranial nerves intact.  Moves all extremities; Gait normal.  + tremor of the outstretched hand.  DTRs   2/4 ,   EXTREMITIES: No " clubbing, cyanosis or edema.    DATA REVIEW

## 2019-10-22 NOTE — TELEPHONE ENCOUNTER
----- Message from Jocelynn Jerez RN sent at 10/22/2019 10:23 AM CDT -----  Hello,    Can you please add the hold on 12/18 at 1130 with Dr. Donis. And make a lab and echo appointment beforehand. (orders in)    Thanks,     Jocelynn Jerez, RN BSN   Post Heart Transplant Nurse Coordinator  [facility]  Questions: [phone]

## 2019-10-22 NOTE — PATIENT INSTRUCTIONS
I recommend we use Alendronate 70 mg/week until we can get to more expensive Reclast in the future.       CALCIUM Recommendation 1000 mg/day in divided dose of no more than 500 mg/dose. This includes what is in your food and also what is in any supplements you are taking.      Dietary sources of calcium: These also contain vitamin D  Milk / buttermilk              8 oz            300 mg  Dry milk powder       5 Tbsp             300 mg  Yogurt                          1 cup           400 mg  Ice cream   1/2 cup          100 mg  Hard cheese                     1.5 oz          300 mg  Cottage cheese                1/2 cup        65 mg  Spinach, cooked  1 cup  240 mg  Tofu, soft regular           4 oz          120 to 390 mg  Sardines                       3 oz               370 mg  Mackerel canned         3 oz                250 mg  Canned salmon with bones 3 oz        170-210 mg  Calcium fortified cereals are available  SILK soy and almond milk products are calcium fortified  Orange juice  Fortified with Calcium       8 oz            300 mg  Low fat dairy sources are recommended      Recommended exercise goals:    30 minutes of moderate exercise on most days of the week .  Weight bearing exercise (ie, walking, jogging, hiking, dancing)    Strength training 2 or more times/week in addition to other weight -being exercise.  Strength training -- uses weight or resistance beyond that seen in everyday activities -(pilates, weight training with free weights, weight machines or resistance bands)    OSTEOPOROSIS of the spine: avoid exercise machines that incorporate spinal flexion, trunk rotation, or forward bending   Specifically avoid abdominal exercisers, stationary bikes with moving handles, cross-country ski machines, rowing machines, and bicep curl machines    _____________________________      Spinal fractures: AVOID activities that place significant force on the spine such as horse back riding,  tennis, golf or  bowling

## 2019-10-22 NOTE — LETTER
"10/22/2019       RE: Everton Larios  [address] [facility] [address]     Dear Colleague,    Thank you for referring your patient, Everton Larios, to the [facility] ENDOCRINOLOGY at [facility]. Please see a copy of my visit note below.    Endocrine Consult note    Attending Assessment/Plan :     Osteoporosis with history of  L1 compression fracture..  Start alendronate 70 mg/week after clearing the ordered labs   Labs to include vitamin D   I have counseled him on calcium intake - see patient instructions - recommend 1000 mg/day through diet or supplements  I have counseled him on bone exercise.     Heart transplant recipient. He is on immunosuppressants now.      History of corticosteroid exposures associated with heart transplant and UC.     Low TSH on LT4 .  TFTs including T3.  Treat to normal target TSH.  Labs following appt show normal T3, free T4 and only low TSH.  Reduce LT4 dose by 1/2 tablet per week to 100 * 6.5/week, divided.  Repeat labs in 3 months (Feb, 2020)    CKD, stage 3 with eGFR down to the 40's.      Right superior subcm nodule seen on 1/19 carotid US.  I am noting this so I don't forget about ti.     Agata Weathers MD      Chief complaint:  Everton is a 54 year old male seen in consultation at the request of Elin Jensen NP for heart transplant for bone density.  I have reviewed Care Everywhere including [facility] lab reports, imaging reports and provider notes as indicated.        HISTORY OF PRESENT ILLNESS  DXA 5/7/18 [facility] showed lowest T-score -2.6 at the L2-L4 spine  DXA 2/15/19 ([facility]) showed lowest T-score -3.5 at the L2-L4 spine   He has had the following fracture:  Compression fracture of vertebral body April, 2019-  Easter Sunday - got knocked over by dog fall from standing height.   Maximum height 5'8\".  Height was measured today    His bone risks include the following:  Heart transplant 4/24/19;  This was " "associated with multiple high dose steroids.  He believes the Last steroid was ? June  He has never had kidney stones  He has been on Levothyroxine x about 7 years, started by Dr Wolff - he can't remember the circumstances.  He has been on the current dose \"quite a while\" > 1 year    Bone medication:   Calcium + D once/day bid after eating; wtihin 2 hours of dinner--this is since out of the hospital  Mg once/day - he doesn't knwo what it is for  He is not using the flovent inhaler     As a child he drank milk  Currently his dietary calcium includes:   Milk very little-- only with cereal or in cooking  Yogurt - now and then  Ice cream - loves - almost daily  Cheese - loves -almost daily    Labs:  10/4/18 amiodarone started?  Today he states he has been off of it about one linh  10/11/18 TSH 15.44, free T4 1.11  10/30/18 TSH 13.55,   11/15/18 TSH 15.97  11/16/18 LT4 50, 75 mcg/day; 88 mcg/day  11/20/18 free T4 1.37  1/2/19 TSH 10.41, free T4 1.32  2/18/19 LT4 100 mcg/day  2/23/19: HgbA1c 5.7%  2/24/19 heart transplant   3/9/19: TSH 1.05  5/17/19: TSH 2.67  5/22/19 TSH 3.56  7/17/19: TSH 1.79  9/17/19: TSH 0.05, free T4 1.33  10/3/19: Ca 8.7, creatinine 1.61  10/15/19: Ca 9.1, phos 4.2, Mg 1.8, albumin 3.6, creatinine 1.2, alk phos 230,   10/22/19 25OHD 36- results followed appt, not discussed at appt.     Imaging  1/14/19 carotid US - incidental right thyroid nodule subcm  4/21/19 CT T and L spine ([facility]): compression L1 by 15-20%  4/29/19 CT CAP ([facility]): Compression T12 30-40% heigh tloss - new since 3/19/19,. Thyroid assymmetrical with left extending larger than right.   5/1/19 MRI T spine: Mild superior endplate compression L1       REVIEW OF SYSTEMS  Hasn't felt well all of 2019, except last weeks  Weight is increasing after losing a lot of weight to 114#   4 years ago was 180#; 170 most of adult life.    Cardiac: chest pain once in a while -at rest-  Respiratory: getting better at walking and climibng " stairs, getting stronger  GI: Recent c diff and CMV; currently no diarrhea; no abdeominal pain  A lot of joint pain, ankelsl espeically, one hip, right wrist  Right shoulder needs surgery  No bone pain    Past Medical History  Past Medical History:   Diagnosis Date     Alcohol abuse      Pierce's esophagus 10/4/2018     Chronic rhinitis 10/4/2018     Chronic systolic heart failure (H) 10/4/2018     Depression 10/4/2018     Diastolic dysfunction      DJD (degenerative joint disease) - neck 10/4/2018     H/O congenital atrial septal defect (ASD) repair at age 3 10/4/2018     Hypothyroidism due to medication 10/4/2018     ICD, MDVIP 2008; gen change 2/2018 10/4/2018     Intermittent asthma without complication 10/4/2018     Nonischemic cardiomyopathy (H) 10/4/2018     On amiodarone therapy 10/4/2018     Paroxysmal atrial fibrillation (H) 10/4/2018     S/P ablation of atrial fibrillation 10/4/2018     Systolic heart failure (H)      Ulcerative colitis (H) 2005     Ventricular tachycardia (H) 10/4/2018     Past Surgical History:   Procedure Laterality Date     AICD, DUAL CHAMBER       BRONCHOSCOPY (RIGID OR FLEXIBLE), DIAGNOSTIC N/A 4/30/2019    Procedure: BRONCHOSCOPY, WITH BAL;  Surgeon: Margot Chiang MD;  Location:  GI     CV HEART BIOPSY N/A 3/4/2019    Procedure: Heart Biopsy;  Surgeon: Abhijeet Titus MD;  Location:  HEART CARDIAC CATH LAB     CV HEART BIOPSY N/A 3/25/2019    Procedure: Heart Biopsy;  Surgeon: Yves Rodrigues MD;  Location:  HEART CARDIAC CATH LAB     CV HEART BIOPSY N/A 3/28/2019    Procedure: Heart Cath Heart Biopsy;  Surgeon: Yves Rodrigues MD;  Location:  HEART CARDIAC CATH LAB     CV HEART BIOPSY N/A 4/10/2019    Procedure: CV HEART BIOPSY;  Surgeon: Isiah Mcallister MD;  Location:  HEART CARDIAC CATH LAB     CV HEART BIOPSY N/A 4/24/2019    Procedure: CV HEART BIOPSY;  Surgeon: Isiah Mcallister MD;  Location:  HEART CARDIAC CATH LAB     CV  HEART BIOPSY N/A 5/8/2019    Procedure: CV HEART BIOPSY;  Surgeon: Isiah Mcallister MD;  Location: [facility] HE[facility] CARDIAC CATH LAB     CV HEART BIOPSY N/A 6/4/2019    Procedure: CV HEART BIOPSY;  Surgeon: Alton Solomon MD;  Location: [facility] HEART CARDIAC CATH LAB     CV HEART BIOPSY N/A 5/22/2019    Procedure: CV HEART BIOPSY;  Surgeon: Yves Rodrigues MD;  Location: [facility] HEART CARDIAC CATH LAB     CV HEART BIOPSY N/A 7/17/2019    Procedure: CV HEART BIOPSY;  Surgeon: Isiah Mcallister MD;  Location: [facility] HEART CARDIAC CATH LAB     CV HEART BIOPSY N/A 8/13/2019    Procedure: CV HEART BIOPSY;  Surgeon: Jackson Patten MD;  Location:  HEART CARDIAC CATH LAB     CV HEART BIOPSY N/A 10/15/2019    Procedure: CV HEART BIOPSY;  Surgeon: Santino Mckeon MD;  Location:  HEART CARDIAC CATH LAB     CV INTRA-AORTIC BALLOON PUMP INSERTION N/A 2/18/2019    Procedure: Intra-Aortic Balloon;  Surgeon: Alton Solomon MD;  Location:  HEART CARDIAC CATH LAB     CV INTRA-AORTIC BALLOON PUMP INSERTION N/A 2/20/2019    Procedure: Replace subclavian IABP;  Surgeon: Yves Rodrigues MD;  Location:  HEART CARDIAC CATH LAB     CV LEFT HEART CATH N/A 3/19/2019    Procedure: Left Heart Cath;  Surgeon: Yves Rodrigues MD;  Location:  HEART CARDIAC CATH LAB     CV RIGHT HEART CATH N/A 3/19/2019    Procedure: RHC/HBx - Femoral access for 3/19 per Nimisha GONZALEZ;  Surgeon: Yves Rodrigues MD;  Location:  HEART CARDIAC CATH LAB     CV RIGHT HEART CATH N/A 3/25/2019    Procedure: Right Heart Cath;  Surgeon: Yves Rodrigues MD;  Location:  HEART CARDIAC CATH LAB     CV RIGHT HEART CATH N/A 3/28/2019    Procedure: Heart Cath Right Heart Cath;  Surgeon: Yves Rodrigues MD;  Location:  HEART CARDIAC CATH LAB     CV RIGHT HEART CATH N/A 4/24/2019    Procedure: CV RIGHT HEART CATH;  Surgeon: Isiah Mcallister MD;  Location:  HEART CARDIAC CATH LAB     CV RIGHT HEART CATH N/A 3/11/2019     Procedure: ADD ON RHC/HBX;  Surgeon: Alton Solomon MD;  Location:  HEART CARDIAC CATH LAB     CV RIGHT HEART CATH N/A 6/4/2019    Procedure: CV RIGHT HEART CATH;  Surgeon: Alton Solomon MD;  Location:  HEART CARDIAC CATH LAB     CV RIGHT HEART CATH N/A 5/22/2019    Procedure: CV RIGHT HEART CATH;  Surgeon: Yves Rodrigues MD;  Location:  HEART CARDIAC CATH LAB     CV RIGHT HEART CATH N/A 8/13/2019    Procedure: CV RIGHT HEART CATH;  Surgeon: Jackson Patten MD;  Location:  HEART CARDIAC CATH LAB     CV RIGHT HEART CATH N/A 10/15/2019    Procedure: CV RIGHT HEART CATH;  Surgeon: Santino Mckeon MD;  Location:  HEART CARDIAC CATH LAB     INCISION AND DRAINAGE CHEST WASHOUT, COMBINED N/A 3/21/2019    Procedure: Incision And Drainage; Evacuation Right Chest Wall Hematoma;  Surgeon: Dewayne House MD;  Location: [facility]     INSERT INTRAAORTIC BALLOON PUMP Left 2/19/2019    Procedure: Insert left  Subclavian Balloon Pump,  Removal Right femoral arterial balloom pump sheath;  Surgeon: Dewayne House MD;  [facility]     INSERT INTRAAORTIC BALLOON PUMP Left 2/21/2019    Procedure: SUBCLAVIAN BALLOON PUMP PLACEMENT;  Surgeon: Ben White MD;  Location: [facility] OR     IR PICC PLACEMENT > 5 YRS OF AGE  5/8/2019     TRANSPLANT HEART RECIPIENT N/A 2/24/2019    Procedure: TRANSPLANT HEART RECIPIENT;  Surgeon: Dewayne House MD;  Location: [facility]       Medications    Current Outpatient Medications   Medication Sig Dispense Refill     acetaminophen (TYLENOL) 325 MG tablet Take 2 tablets (650 mg) by mouth every 4 hours as needed for mild pain       albuterol (PROAIR HFA/PROVENTIL HFA/VENTOLIN HFA) 108 (90 Base) MCG/ACT inhaler Inhale 2 puffs into the lungs every 6 hours as needed for shortness of breath / dyspnea or wheezing       amLODIPine (NORVASC) 5 MG tablet TAKE 1 TABLET BY MOUTH EVERY DAY 30 tablet 2     aspirin (ASA) 81 MG chewable tablet Take 1 tablet  (81 mg) by mouth daily       calcium carbonate 600 mg-vitamin D 400 units (CALTRATE) 600-400 MG-UNIT per tablet Take 1 tablet by mouth 2 times daily (with meals)       cetirizine (ZYRTEC) 10 MG tablet Take 10 mg by mouth daily       DULoxetine (CYMBALTA) 20 MG EC capsule Take 20 mg by mouth daily       fluticasone (FLOVENT HFA) 220 MCG/ACT Inhaler Inhale 2 puffs into the lungs 2 times daily       levothyroxine (SYNTHROID/LEVOTHROID) 100 MCG tablet Take 100 mcg by mouth daily        magnesium oxide (MAG-OX) 400 MG tablet Take 1 tablet (400 mg) by mouth 2 times daily 180 tablet 3     melatonin 3 MG tablet Take 2 tablets (6 mg) by mouth At Bedtime (Patient taking differently: Take 5 mg by mouth At Bedtime )       mesalamine (ASACOL HD) 800 MG EC tablet Take 1 tablet (800 mg) by mouth 3 times daily 270 tablet 1     multivitamin w/minerals (THERA-VIT-M) tablet Take 1 tablet by mouth daily       mycophenolate (GENERIC EQUIVALENT) 250 MG capsule Take 6 capsules (1,500 mg) by mouth 2 times daily 360 capsule 11     omeprazole (PRILOSEC) 40 MG DR capsule Take 1 capsule (40 mg) by mouth daily       ondansetron (ZOFRAN-ODT) 4 MG ODT tab DISSOLVE 1 TABLET ON THE TONGUE EVERY EIGHT HOURS AS NEEDED  3     polyethylene glycol (MIRALAX/GLYCOLAX) powder Take 1 capful by mouth daily       rosuvastatin (CRESTOR) 10 MG tablet Take 1 tablet (10 mg) by mouth daily 90 tablet 1     senna (SENOKOT) 8.6 MG tablet Take 2 tablets by mouth 2 times daily as needed for constipation       tacrolimus (GENERIC EQUIVALENT) 1 MG capsule Take 3 capsules (3 mg) in am and take 2 capsules (2 mg) in pm. 30 capsule 3     valGANciclovir (VALCYTE) 450 MG tablet Take 1 tablet (450 mg) by mouth daily 60 tablet 2       Allergies  Allergies   Allergen Reactions     Hydromorphone Other (See Comments)     Significant Delirium     Adhesive Tape Blisters     Tegaderm causes bruises, blisters and extreme irritation.     Lisinopril Other (See Comments)     hypotension  "    Quinolones Other (See Comments)     Would not rx quinolones until QTc interval improved.      Tobramycin Other (See Comments)     Would not use aminoglycosides as his kidney function is very borderline     Codeine Nausea       Family History  family history includes Endometrial Cancer in his maternal grandmother and mother; Hypertension in his father; Osteoporosis in his mother.    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      Physical Exam  /76   Pulse 118   Ht 1.727 m (5' 8\")   Wt 60.8 kg (134 lb)   BMI 20.37 kg/m     Body mass index is 20.37 kg/m .  GENERAL :  Pleasant middle aged man In no apparent distress  SKIN: Normal color, normal temperature, texture.  No  purple striae.     EYES: PERRL, EOMI, No scleral icterus,  No proptosis, conjunctival redness, stare, retraction  MOUTH: Moist, pink; pharynx clear  NECK: No visible masses. No palpable adenopathy, or masses. No carotid bruits.   THYROID:  Palpable, poorly defined.    RESP: Lungs clear to auscultation bilaterally  CARDIAC: tachy, hyperdynamic;  Regular,  normal S1 S2, without murmurs, rubs or gallops    ABDOMEN: Normal bowel sounds; soft, nontender, no HSM or masses       NEURO: awake, alert, responds appropriately to questions.  Cranial nerves intact.  Moves all extremities; Gait normal.  + tremor of the outstretched hand.  DTRs   2/4 ,   EXTREMITIES: No " clubbing, cyanosis or edema.    DATA REVIEW          Again, thank you for allowing me to participate in the care of your patient.      Sincerely,    Agata Weathers MD

## 2019-10-23 ENCOUNTER — TELEPHONE (OUTPATIENT)
Dept: CARE COORDINATION | Facility: CLINIC | Age: 54
End: 2019-10-23

## 2019-10-23 LAB — DEPRECATED CALCIDIOL+CALCIFEROL SERPL-MC: 36 UG/L (ref 20–75)

## 2019-10-23 RX ORDER — ROSUVASTATIN CALCIUM 10 MG/1
TABLET, COATED ORAL
Qty: 30 TABLET | Refills: 5 | Status: SHIPPED | OUTPATIENT
Start: 2019-10-23 | End: 2020-05-11

## 2019-10-23 NOTE — TELEPHONE ENCOUNTER
Transplant Social Work Services Phone Call      Data: Heart Transplant received VM asking that writer send his donor letter. Writer called pt back and had to leave a VM. Writer will send donor letter today and will f/u with a phone call next week to assess coping after receiving donor letter.   Writer asked pt to return call regarding his Valcyte and the PAP.     Intervention: Sending Donor Letter today   Assessment: Unable to get a hold of pt.   Education provided by SW: Ongoing Social Work support   Plan:    SW will attempt to call pt next week after he should have received and read his donor letter.

## 2019-10-24 DIAGNOSIS — B25.9 CYTOMEGALOVIRUS (CMV) VIREMIA (H): ICD-10-CM

## 2019-10-24 DIAGNOSIS — Z94.1 HEART REPLACED BY TRANSPLANT (H): ICD-10-CM

## 2019-10-24 DIAGNOSIS — Z94.1 TRANSPLANTED HEART (H): ICD-10-CM

## 2019-10-24 LAB
ALBUMIN SERPL-MCNC: 3.9 G/DL (ref 3.4–5)
ALP SERPL-CCNC: 233 U/L (ref 40–150)
ALT SERPL W P-5'-P-CCNC: 27 U/L (ref 0–70)
ANION GAP SERPL CALCULATED.3IONS-SCNC: 8 MMOL/L (ref 3–14)
AST SERPL W P-5'-P-CCNC: 26 U/L (ref 0–45)
BILIRUB SERPL-MCNC: 0.4 MG/DL (ref 0.2–1.3)
BUN SERPL-MCNC: 39 MG/DL (ref 7–30)
CALCIUM SERPL-MCNC: 9.1 MG/DL (ref 8.5–10.1)
CHLORIDE SERPL-SCNC: 108 MMOL/L (ref 94–109)
CO2 SERPL-SCNC: 23 MMOL/L (ref 20–32)
CREAT SERPL-MCNC: 1.19 MG/DL (ref 0.66–1.25)
ERYTHROCYTE [DISTWIDTH] IN BLOOD BY AUTOMATED COUNT: 16.2 % (ref 10–15)
GFR SERPL CREATININE-BSD FRML MDRD: 68 ML/MIN/{1.73_M2}
GLUCOSE SERPL-MCNC: 86 MG/DL (ref 70–99)
HCT VFR BLD AUTO: 28.7 % (ref 40–53)
HGB BLD-MCNC: 9 G/DL (ref 13.3–17.7)
MCH RBC QN AUTO: 26.5 PG (ref 26.5–33)
MCHC RBC AUTO-ENTMCNC: 31.4 G/DL (ref 31.5–36.5)
MCV RBC AUTO: 85 FL (ref 78–100)
PLATELET # BLD AUTO: 321 10E9/L (ref 150–450)
POTASSIUM SERPL-SCNC: 4.5 MMOL/L (ref 3.4–5.3)
PROT SERPL-MCNC: 7 G/DL (ref 6.8–8.8)
RBC # BLD AUTO: 3.39 10E12/L (ref 4.4–5.9)
SODIUM SERPL-SCNC: 139 MMOL/L (ref 133–144)
TACROLIMUS BLD-MCNC: 4.7 UG/L (ref 5–15)
TME LAST DOSE: ABNORMAL H
WBC # BLD AUTO: 3.7 10E9/L (ref 4–11)

## 2019-10-24 PROCEDURE — 80053 COMPREHEN METABOLIC PANEL: CPT | Performed by: INTERNAL MEDICINE

## 2019-10-24 PROCEDURE — 36415 COLL VENOUS BLD VENIPUNCTURE: CPT | Performed by: INTERNAL MEDICINE

## 2019-10-24 PROCEDURE — 80197 ASSAY OF TACROLIMUS: CPT | Performed by: INTERNAL MEDICINE

## 2019-10-24 PROCEDURE — 85027 COMPLETE CBC AUTOMATED: CPT | Performed by: INTERNAL MEDICINE

## 2019-10-25 ENCOUNTER — TELEPHONE (OUTPATIENT)
Dept: TRANSPLANT | Facility: CLINIC | Age: 54
End: 2019-10-25

## 2019-10-25 DIAGNOSIS — Z94.1 HEART REPLACED BY TRANSPLANT (H): ICD-10-CM

## 2019-10-25 RX ORDER — TACROLIMUS 1 MG/1
3 CAPSULE ORAL 2 TIMES DAILY
Qty: 180 CAPSULE | Refills: 11 | Status: SHIPPED | OUTPATIENT
Start: 2019-10-25 | End: 2020-01-22

## 2019-10-25 NOTE — TELEPHONE ENCOUNTER
Writer called [facility] who stated that patient was approved to [facility] on 10/23. Patient returned call to [facility] on 10/25. Patient will contact [facility] when he is in need of a refill of Valcyte.

## 2019-10-25 NOTE — TELEPHONE ENCOUNTER
Results called to patient  CMP  CBC- WBC 3.7 low stable/trending up  Tacro level 4.7 (goal 6-8). Confirmed 12-hour trough and current dose 3/2. Dose increased to 3 mg bid and patient will recheck labs next week.   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   Message sent to Dr. Shukla with updated info and most recent CMV level and will notify patient if valcyte should be restarted.     Patient verbalized understanding and next steps.

## 2019-10-29 ENCOUNTER — TELEPHONE (OUTPATIENT)
Dept: TRANSPLANT | Facility: CLINIC | Age: 54
End: 2019-10-29

## 2019-10-29 NOTE — TELEPHONE ENCOUNTER
cmv <137. Pt approved for valcyte. I spoke with Dr. Shukla, no valcyte at this point. We will continue with weekly cmv labs, if >1500 or if patient becomes symptomatic (unexplained fever, diarrhea, etc.) then we would put him back on valcyte.     Pt updated and agreed with poc. December appointments reviewed.

## 2019-10-31 DIAGNOSIS — Z94.1 HEART REPLACED BY TRANSPLANT (H): ICD-10-CM

## 2019-10-31 LAB
ERYTHROCYTE [DISTWIDTH] IN BLOOD BY AUTOMATED COUNT: 16.6 % (ref 10–15)
HCT VFR BLD AUTO: 28.4 % (ref 40–53)
HGB BLD-MCNC: 9 G/DL (ref 13.3–17.7)
MCH RBC QN AUTO: 27 PG (ref 26.5–33)
MCHC RBC AUTO-ENTMCNC: 31.7 G/DL (ref 31.5–36.5)
MCV RBC AUTO: 85 FL (ref 78–100)
PLATELET # BLD AUTO: 319 10E9/L (ref 150–450)
RBC # BLD AUTO: 3.33 10E12/L (ref 4.4–5.9)
WBC # BLD AUTO: 4.5 10E9/L (ref 4–11)

## 2019-10-31 PROCEDURE — 36415 COLL VENOUS BLD VENIPUNCTURE: CPT | Performed by: INTERNAL MEDICINE

## 2019-10-31 PROCEDURE — 80197 ASSAY OF TACROLIMUS: CPT | Performed by: INTERNAL MEDICINE

## 2019-10-31 PROCEDURE — 80053 COMPREHEN METABOLIC PANEL: CPT | Performed by: INTERNAL MEDICINE

## 2019-10-31 PROCEDURE — 85027 COMPLETE CBC AUTOMATED: CPT | Performed by: INTERNAL MEDICINE

## 2019-11-01 LAB
ALBUMIN SERPL-MCNC: 3.9 G/DL (ref 3.4–5)
ALP SERPL-CCNC: 217 U/L (ref 40–150)
ALT SERPL W P-5'-P-CCNC: 31 U/L (ref 0–70)
ANION GAP SERPL CALCULATED.3IONS-SCNC: 9 MMOL/L (ref 3–14)
AST SERPL W P-5'-P-CCNC: 25 U/L (ref 0–45)
BILIRUB SERPL-MCNC: 0.4 MG/DL (ref 0.2–1.3)
BUN SERPL-MCNC: 26 MG/DL (ref 7–30)
CALCIUM SERPL-MCNC: 8.5 MG/DL (ref 8.5–10.1)
CHLORIDE SERPL-SCNC: 110 MMOL/L (ref 94–109)
CMV DNA SPEC NAA+PROBE-ACNC: <137 [IU]/ML
CMV DNA SPEC NAA+PROBE-LOG#: <2.1 {LOG_IU}/ML
CO2 SERPL-SCNC: 20 MMOL/L (ref 20–32)
CREAT SERPL-MCNC: 1.13 MG/DL (ref 0.66–1.25)
GFR SERPL CREATININE-BSD FRML MDRD: 72 ML/MIN/{1.73_M2}
GLUCOSE SERPL-MCNC: 103 MG/DL (ref 70–99)
POTASSIUM SERPL-SCNC: 4.5 MMOL/L (ref 3.4–5.3)
PROT SERPL-MCNC: 6.8 G/DL (ref 6.8–8.8)
SODIUM SERPL-SCNC: 139 MMOL/L (ref 133–144)
SPECIMEN SOURCE: ABNORMAL
TACROLIMUS BLD-MCNC: 6.4 UG/L (ref 5–15)
TME LAST DOSE: NORMAL H

## 2019-11-07 DIAGNOSIS — Z94.1 TRANSPLANTED HEART (H): ICD-10-CM

## 2019-11-07 DIAGNOSIS — Z94.1 HEART REPLACED BY TRANSPLANT (H): ICD-10-CM

## 2019-11-07 LAB
ERYTHROCYTE [DISTWIDTH] IN BLOOD BY AUTOMATED COUNT: 16.6 % (ref 10–15)
HCT VFR BLD AUTO: 28.6 % (ref 40–53)
HGB BLD-MCNC: 8.9 G/DL (ref 13.3–17.7)
MCH RBC QN AUTO: 26.7 PG (ref 26.5–33)
MCHC RBC AUTO-ENTMCNC: 31.1 G/DL (ref 31.5–36.5)
MCV RBC AUTO: 86 FL (ref 78–100)
PLATELET # BLD AUTO: 244 10E9/L (ref 150–450)
RBC # BLD AUTO: 3.33 10E12/L (ref 4.4–5.9)
TACROLIMUS BLD-MCNC: 7.8 UG/L (ref 5–15)
TME LAST DOSE: 2130 H
WBC # BLD AUTO: 4.3 10E9/L (ref 4–11)

## 2019-11-07 PROCEDURE — 80197 ASSAY OF TACROLIMUS: CPT | Performed by: INTERNAL MEDICINE

## 2019-11-07 PROCEDURE — 80053 COMPREHEN METABOLIC PANEL: CPT | Performed by: INTERNAL MEDICINE

## 2019-11-07 PROCEDURE — 36415 COLL VENOUS BLD VENIPUNCTURE: CPT | Performed by: INTERNAL MEDICINE

## 2019-11-07 PROCEDURE — 85027 COMPLETE CBC AUTOMATED: CPT | Performed by: INTERNAL MEDICINE

## 2019-11-08 ENCOUNTER — TELEPHONE (OUTPATIENT)
Dept: TRANSPLANT | Facility: CLINIC | Age: 54
End: 2019-11-08

## 2019-11-08 DIAGNOSIS — Z94.1 HEART REPLACED BY TRANSPLANT (H): Primary | ICD-10-CM

## 2019-11-08 LAB
ALBUMIN SERPL-MCNC: 3.8 G/DL (ref 3.4–5)
ALP SERPL-CCNC: 206 U/L (ref 40–150)
ALT SERPL W P-5'-P-CCNC: 26 U/L (ref 0–70)
ANION GAP SERPL CALCULATED.3IONS-SCNC: 10 MMOL/L (ref 3–14)
AST SERPL W P-5'-P-CCNC: 17 U/L (ref 0–45)
BILIRUB SERPL-MCNC: 0.5 MG/DL (ref 0.2–1.3)
BUN SERPL-MCNC: 29 MG/DL (ref 7–30)
CALCIUM SERPL-MCNC: 9 MG/DL (ref 8.5–10.1)
CHLORIDE SERPL-SCNC: 110 MMOL/L (ref 94–109)
CMV DNA SPEC NAA+PROBE-ACNC: 174 [IU]/ML
CMV DNA SPEC NAA+PROBE-LOG#: 2.2 {LOG_IU}/ML
CO2 SERPL-SCNC: 20 MMOL/L (ref 20–32)
CREAT SERPL-MCNC: 1.16 MG/DL (ref 0.66–1.25)
GFR SERPL CREATININE-BSD FRML MDRD: 70 ML/MIN/{1.73_M2}
GLUCOSE SERPL-MCNC: 117 MG/DL (ref 70–99)
POTASSIUM SERPL-SCNC: 4.5 MMOL/L (ref 3.4–5.3)
PROT SERPL-MCNC: 6.9 G/DL (ref 6.8–8.8)
SODIUM SERPL-SCNC: 140 MMOL/L (ref 133–144)
SPECIMEN SOURCE: ABNORMAL

## 2019-11-08 NOTE — TELEPHONE ENCOUNTER
LM for patient with results  -Stable CMP  -CMV detected 174, continue weekly labs  -Tacro level 7.8 (goal 6-8) Continue 3 mg bid.    Encouraged Pt to call back with questions/concerns.

## 2019-11-14 DIAGNOSIS — Z94.1 HEART REPLACED BY TRANSPLANT (H): ICD-10-CM

## 2019-11-14 DIAGNOSIS — Z94.1 TRANSPLANTED HEART (H): ICD-10-CM

## 2019-11-14 LAB
ERYTHROCYTE [DISTWIDTH] IN BLOOD BY AUTOMATED COUNT: 16.1 % (ref 10–15)
HCT VFR BLD AUTO: 27.9 % (ref 40–53)
HGB BLD-MCNC: 8.7 G/DL (ref 13.3–17.7)
MCH RBC QN AUTO: 27 PG (ref 26.5–33)
MCHC RBC AUTO-ENTMCNC: 31.2 G/DL (ref 31.5–36.5)
MCV RBC AUTO: 87 FL (ref 78–100)
PLATELET # BLD AUTO: 232 10E9/L (ref 150–450)
RBC # BLD AUTO: 3.22 10E12/L (ref 4.4–5.9)
TACROLIMUS BLD-MCNC: 7 UG/L (ref 5–15)
TME LAST DOSE: 2045 H
WBC # BLD AUTO: 4 10E9/L (ref 4–11)

## 2019-11-14 PROCEDURE — 80053 COMPREHEN METABOLIC PANEL: CPT | Performed by: INTERNAL MEDICINE

## 2019-11-14 PROCEDURE — 85027 COMPLETE CBC AUTOMATED: CPT | Performed by: INTERNAL MEDICINE

## 2019-11-14 PROCEDURE — 80197 ASSAY OF TACROLIMUS: CPT | Performed by: INTERNAL MEDICINE

## 2019-11-14 PROCEDURE — 36415 COLL VENOUS BLD VENIPUNCTURE: CPT | Performed by: INTERNAL MEDICINE

## 2019-11-15 ENCOUNTER — TELEPHONE (OUTPATIENT)
Dept: TRANSPLANT | Facility: CLINIC | Age: 54
End: 2019-11-15

## 2019-11-15 LAB
ALBUMIN SERPL-MCNC: 3.6 G/DL (ref 3.4–5)
ALP SERPL-CCNC: 195 U/L (ref 40–150)
ALT SERPL W P-5'-P-CCNC: 24 U/L (ref 0–70)
ANION GAP SERPL CALCULATED.3IONS-SCNC: 8 MMOL/L (ref 3–14)
AST SERPL W P-5'-P-CCNC: 23 U/L (ref 0–45)
BILIRUB SERPL-MCNC: 0.5 MG/DL (ref 0.2–1.3)
BUN SERPL-MCNC: 30 MG/DL (ref 7–30)
CALCIUM SERPL-MCNC: 8.7 MG/DL (ref 8.5–10.1)
CHLORIDE SERPL-SCNC: 111 MMOL/L (ref 94–109)
CMV DNA SPEC NAA+PROBE-ACNC: <137 [IU]/ML
CMV DNA SPEC NAA+PROBE-LOG#: <2.1 {LOG_IU}/ML
CO2 SERPL-SCNC: 21 MMOL/L (ref 20–32)
CREAT SERPL-MCNC: 1.12 MG/DL (ref 0.66–1.25)
GFR SERPL CREATININE-BSD FRML MDRD: 73 ML/MIN/{1.73_M2}
GLUCOSE SERPL-MCNC: 80 MG/DL (ref 70–99)
POTASSIUM SERPL-SCNC: 4.6 MMOL/L (ref 3.4–5.3)
PROT SERPL-MCNC: 6.6 G/DL (ref 6.8–8.8)
SODIUM SERPL-SCNC: 140 MMOL/L (ref 133–144)
SPECIMEN SOURCE: ABNORMAL

## 2019-11-15 NOTE — TELEPHONE ENCOUNTER
LM for patient with results  -Stable CMP  -CMV detected <137, continue weekly labs  -Tacro level 7 (goal 6-8) Continue 3 mg bid.

## 2019-11-17 DIAGNOSIS — R79.89 LOW TSH LEVEL: ICD-10-CM

## 2019-11-17 DIAGNOSIS — M80.00XD AGE-RELATED OSTEOPOROSIS WITH CURRENT PATHOLOGICAL FRACTURE WITH ROUTINE HEALING, SUBSEQUENT ENCOUNTER: Primary | ICD-10-CM

## 2019-11-17 DIAGNOSIS — Z92.241 HISTORY OF CORTICOSTEROID THERAPY: ICD-10-CM

## 2019-11-17 DIAGNOSIS — E03.2 HYPOTHYROIDISM DUE TO MEDICATION: Chronic | ICD-10-CM

## 2019-11-17 RX ORDER — LEVOTHYROXINE SODIUM 100 UG/1
TABLET ORAL
Qty: 90 TABLET | Refills: 3 | Status: SHIPPED | OUTPATIENT
Start: 2019-11-17 | End: 2021-07-28

## 2019-11-17 RX ORDER — ALENDRONATE SODIUM 70 MG/1
70 TABLET ORAL
Qty: 12 TABLET | Refills: 3 | Status: SHIPPED | OUTPATIENT
Start: 2019-11-17 | End: 2020-11-23

## 2019-11-22 DIAGNOSIS — Z94.1 TRANSPLANTED HEART (H): ICD-10-CM

## 2019-11-22 LAB
ALBUMIN SERPL-MCNC: 3.7 G/DL (ref 3.4–5)
ALP SERPL-CCNC: 185 U/L (ref 40–150)
ALT SERPL W P-5'-P-CCNC: 26 U/L (ref 0–70)
ANION GAP SERPL CALCULATED.3IONS-SCNC: 11 MMOL/L (ref 3–14)
AST SERPL W P-5'-P-CCNC: 21 U/L (ref 0–45)
BILIRUB SERPL-MCNC: 0.4 MG/DL (ref 0.2–1.3)
BUN SERPL-MCNC: 29 MG/DL (ref 7–30)
CALCIUM SERPL-MCNC: 8.8 MG/DL (ref 8.5–10.1)
CHLORIDE SERPL-SCNC: 111 MMOL/L (ref 94–109)
CO2 SERPL-SCNC: 19 MMOL/L (ref 20–32)
CREAT SERPL-MCNC: 1.21 MG/DL (ref 0.66–1.25)
ERYTHROCYTE [DISTWIDTH] IN BLOOD BY AUTOMATED COUNT: 15.3 % (ref 10–15)
GFR SERPL CREATININE-BSD FRML MDRD: 66 ML/MIN/{1.73_M2}
GLUCOSE SERPL-MCNC: 120 MG/DL (ref 70–99)
HCT VFR BLD AUTO: 29 % (ref 40–53)
HGB BLD-MCNC: 9.1 G/DL (ref 13.3–17.7)
MCH RBC QN AUTO: 27.2 PG (ref 26.5–33)
MCHC RBC AUTO-ENTMCNC: 31.4 G/DL (ref 31.5–36.5)
MCV RBC AUTO: 87 FL (ref 78–100)
PLATELET # BLD AUTO: 241 10E9/L (ref 150–450)
POTASSIUM SERPL-SCNC: 4.4 MMOL/L (ref 3.4–5.3)
PROT SERPL-MCNC: 6.8 G/DL (ref 6.8–8.8)
RBC # BLD AUTO: 3.35 10E12/L (ref 4.4–5.9)
SODIUM SERPL-SCNC: 141 MMOL/L (ref 133–144)
TACROLIMUS BLD-MCNC: 7.3 UG/L (ref 5–15)
TME LAST DOSE: NORMAL H
WBC # BLD AUTO: 3.6 10E9/L (ref 4–11)

## 2019-11-22 PROCEDURE — 80053 COMPREHEN METABOLIC PANEL: CPT | Performed by: INTERNAL MEDICINE

## 2019-11-22 PROCEDURE — 85027 COMPLETE CBC AUTOMATED: CPT | Performed by: INTERNAL MEDICINE

## 2019-11-22 PROCEDURE — 36415 COLL VENOUS BLD VENIPUNCTURE: CPT | Performed by: INTERNAL MEDICINE

## 2019-11-22 PROCEDURE — 80197 ASSAY OF TACROLIMUS: CPT | Performed by: INTERNAL MEDICINE

## 2019-11-23 LAB
CMV DNA SPEC NAA+PROBE-ACNC: 286 [IU]/ML
CMV DNA SPEC NAA+PROBE-LOG#: 2.5 {LOG_IU}/ML
SPECIMEN SOURCE: ABNORMAL

## 2019-11-25 ENCOUNTER — TELEPHONE (OUTPATIENT)
Dept: GASTROENTEROLOGY | Facility: CLINIC | Age: 54
End: 2019-11-25

## 2019-11-25 NOTE — TELEPHONE ENCOUNTER
Spoke to patient reminding of appointment scheduled on 11/26/19 at [address] [facility] GI clinic. Patient to arrive 15 min early. To reschedule or cancel patient to call [phone].    JULISA Kearney

## 2019-11-26 ENCOUNTER — OFFICE VISIT (OUTPATIENT)
Dept: GASTROENTEROLOGY | Facility: CLINIC | Age: 54
End: 2019-11-26
Payer: COMMERCIAL

## 2019-11-26 ENCOUNTER — RECORDS - HEALTHEAST (OUTPATIENT)
Dept: ADMINISTRATIVE | Facility: OTHER | Age: 54
End: 2019-11-26

## 2019-11-26 VITALS
HEART RATE: 111 BPM | RESPIRATION RATE: 16 BRPM | HEIGHT: 67 IN | TEMPERATURE: 98.7 F | BODY MASS INDEX: 23.48 KG/M2 | WEIGHT: 149.6 LBS | DIASTOLIC BLOOD PRESSURE: 77 MMHG | OXYGEN SATURATION: 100 % | SYSTOLIC BLOOD PRESSURE: 120 MMHG

## 2019-11-26 DIAGNOSIS — K51.00 ULCERATIVE PANCOLITIS WITHOUT COMPLICATION (H): Primary | ICD-10-CM

## 2019-11-26 SDOH — HEALTH STABILITY: MENTAL HEALTH: HOW OFTEN DO YOU HAVE 6 OR MORE DRINKS ON ONE OCCASION?: LESS THAN MONTHLY

## 2019-11-26 SDOH — HEALTH STABILITY: MENTAL HEALTH: HOW MANY STANDARD DRINKS CONTAINING ALCOHOL DO YOU HAVE ON A TYPICAL DAY?: 1 OR 2

## 2019-11-26 SDOH — HEALTH STABILITY: MENTAL HEALTH: HOW OFTEN DO YOU HAVE A DRINK CONTAINING ALCOHOL?: MONTHLY OR LESS

## 2019-11-26 ASSESSMENT — MIFFLIN-ST. JEOR: SCORE: 1459.27

## 2019-11-26 ASSESSMENT — ENCOUNTER SYMPTOMS
ORTHOPNEA: 0
HYPOTENSION: 0
HYPERTENSION: 0
HYPERTENSION: 0
LIGHT-HEADEDNESS: 0
MUSCLE CRAMPS: 1
LEG PAIN: 1
STIFFNESS: 1
NECK PAIN: 1
STIFFNESS: 1
MUSCLE WEAKNESS: 0
SYNCOPE: 0
ARTHRALGIAS: 1
LIGHT-HEADEDNESS: 0
SYNCOPE: 0
MYALGIAS: 1
EXERCISE INTOLERANCE: 0
PALPITATIONS: 0
MUSCLE CRAMPS: 1
HYPOTENSION: 0
PALPITATIONS: 0
LEG PAIN: 1
SLEEP DISTURBANCES DUE TO BREATHING: 0
NECK PAIN: 1
EXERCISE INTOLERANCE: 0
JOINT SWELLING: 1
SLEEP DISTURBANCES DUE TO BREATHING: 0
BACK PAIN: 1
ARTHRALGIAS: 1
ORTHOPNEA: 0
MYALGIAS: 1
JOINT SWELLING: 1
BACK PAIN: 1
MUSCLE WEAKNESS: 0

## 2019-11-26 ASSESSMENT — PAIN SCALES - GENERAL: PAINLEVEL: NO PAIN (0)

## 2019-11-26 NOTE — LETTER
2019       RE: Everton Larios  [address] [facility] [id number]     Dear Colleague,    Thank you for referring your patient, Everton Larios, to the [facility] GASTROENTEROLOG[facility] AND IBD CLINIC at [facility]. Please see a copy of my visit note below.    IBD CLINIC VISIT     CC/REFERRING MD:  Elin Jensen  REASON FOR CONSULTATION: Establish care for UC Pancolitis     IBD HISTORY  IBD type: UC pancolitis (patient report)  Age at diagnosis: (diagnosed >10 years ago)  Endoscopic extent of disease: Quiescent disease on last colonoscopy ()  Histologic: Quiescent on last colonoscopy ()  Current IBD medications:  Asacol (2019 - present)  Prior IBD surgeries: None  Prior IBD Medications:  - Steroids (many years ago)  - Balsalazide ( - 2019)     DRUG MONITORING  TPMT enzyme activity:  N/A     6-TGN/6-MMPN levels: N/A     Biologic concentration: N/A     DISEASE ASSESSMENT  Labs        Recent Labs   Lab Test 19  1702 05/15/19  0940 19  0536   CRP <2.9 <2.9 69.0*   SED 18  --  37*      Fecal calprotectin: To be collected  Endoscopy: Quiescent , colonoscopy to be scheduled  Enterography: No recent  C diff: To be collected     Yadav score: 0 (partial)  Stool freq: 0 (baseline stools frequency)  Rectal bleedin (None)  PGA: 0 (normal)  Endoscopy: Not done    Remission: <3   Mild disease: 3-5  Moderate disease: 6-10  Severe disease: >10     ASSESSMENT/PLAN:  Everton Larios is a 54 year old male with a history of ASD (s/p repair in childhood), NICM 2/p OHT (19) on tacrolimus & MMF for IST, ulcerative pancolitis (diagnosed  on asacol), recent hospital admission for CMV viremia and CDI treated with PO vancomycin, Luisito's Esophagus who  is here for follow-up management of his UC.     #. Ulcerative pancolitis:  Previoulsy quiescent since  on 5-ASA monotherapy (balsalazide through 2019, now asacol). Patient was following  with [facility]. Recent flare triggered by C Diff and CMV viremia. Diarrhea has resolved and now in clinical remission.    #. H/o CDI:   Diarrhea & abdominal pain with positive PCR 9/20/19. Treated with 10 day course of PO vancomycin QID with resolution of diarrhea. Continues to do well without symptoms concerning for recurrence.    #. 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 Following with Dr. Shukla with ID. He was treated with VGCV with improvement in his CMV viral log -  Due to financial strain and improved symptoms and low viral log he has been moved to weekly monitoring with CMV PCR (not currently taking VGCV). Last CMV quant 286 (increased from <137).     #. Elevated alkaline phosphatase:  Rising since 6/17/19, now 185. Will evaluate for PSC with MRCP given history of UC. Total bilirubin, AST/ALT remain normal.     #. 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 Next due 2020.     #. Osteoporosis with L1 compression fracture:  Follows with PCP. Last DEXA 2018. On calcium and vitamin D, follows endocrinology and recently restarted alendronate.     RECOMMENDATIONS:  -- Schedule a MRCP (MRI of your bile ducts)  -- Schedule colonoscopy for surveillance (dysplasia)  -- Continue asacol 800mg PO TID  -- Continue asacol without changes  -- Will message ID about mildly elevated CMV  -- If any fevers, chills, abdominal pain, nausea, vomiting or diarrhea please call or go to the emergency department  -- PCP to review and update immunizations as needed     IBD Health Care Maintenance:     Vaccinations:    -- Influenza (every year): Due 2019  -- TdaP (every 10 years): 7/2012  -- Pneumococcal Pneumonia (once plus booster at 5 years): Pneumovax 9/28/19, PCV 13 9/26/18  -- Yearly assessment for latent Tb (verbal screening and exam, PPD or QuantiFERON-Tb  testing)     One time confirmation of immunity or serologies:  -- Hepatitis A (serologies or immunizations):   -- Hepatitis B (serologies or immunizations): Immune, titer 1.15  -- Varicella  -- MMR  -- Meningococcal meningitis (all patients at risk for meningitis)  -- Due to the immunosuppression in this patient, I would not advise administration of live vaccines such as varicella/VZV, intranasal influenza, MMR, or yellow fever vaccine (if travelling).       Bone mineral density screening   -- Recommend all patients supplement with calcium and vitamin D     Cancer Screening:  Colon cancer screening:   Given >10 years of pancolonic disease, recommend patient undergo regular dysplasia surveillance   Next dysplasia screening is recommended 2019 (to be scheduled).     Skin cancer screening: Annual visual exam of skin by dermatologist since patient is immunocompromised     Misc:  -- Avoid tobacco use  -- Avoid NSAIDs as there is potentially a 25% chance of causing an IBD flare     Return to clinic in 6 weeks     Pt care plan discussed with Dr. Allen, GI staff physician.     Jocelynn Jensen MD  GI Fellow  [phone]    HPI:   Everton Larios is a 54 year old male with a history of ASD (s/p repair in childhood), NICM 2/p OHT (2/24/19) on tacrolimus & MMF for IST, ulcerative pancolitis (diagnosed 2005 on balsalazide), Luisito's Esophagus who was referred to our clinic to establish care for his ulcerative colitis.    Since discharge from the hospital he continues to do well. States his diarrhea has completely resolved. No having 1-2 soft brown bowel movements daily with 0% blood. Remains on Asacol without complications. No further abdominal pain, nausea, vomiting or fevers. Does mention that he feels that when his CMV titer is slightly higher he will have a second softer bowel movement (thinks this correlates). Following with cardiology, ID and endocrinology.    No myalgias, arthralgias, eye redness/pain or mouth  sores.    ROS:    No fevers or chills  No weight loss  No blurry vision, double vision or change in vision  No sore throat  No lymphadenopathy  No headache, paraesthesias, or weakness in a limb  No shortness of breath or wheezing  No chest pain or pressure  No arthralgias or myalgias  No rashes or skin changes  No odynophagia or dysphagia  No BRBPR, hematochezia, melena  No dysuria, frequency or urgency  No hot/cold intolerance or polyria  No anxiety or depression    Extra intestinal manifestations of IBD:  No uveitis/episcleritis  No aphthous ulcers   No arthritis   No erythema nodosum/pyoderma gangrenosum.     PERTINENT PAST MEDICAL HISTORY:  Past Medical History:   Diagnosis Date     Alcohol abuse      Pierce's esophagus 10/4/2018     Chronic rhinitis 10/4/2018     Chronic systolic heart failure (H) 10/4/2018     Depression 10/4/2018     Diastolic dysfunction      DJD (degenerative joint disease) - neck 10/4/2018     H/O congenital atrial septal defect (ASD) repair at age 3 10/4/2018     Hypothyroidism due to medication 10/4/2018     ICD, [employer] 2008; gen change 2/2018 10/4/2018     Intermittent asthma without complication 10/4/2018     Nonischemic cardiomyopathy (H) 10/4/2018     On amiodarone therapy 10/4/2018     Paroxysmal atrial fibrillation (H) 10/4/2018     S/P ablation of atrial fibrillation 10/4/2018     Systolic heart failure (H)      Ulcerative colitis (H) 2005     Ventricular tachycardia (H) 10/4/2018       PREVIOUS SURGERIES:  Past Surgical History:   Procedure Laterality Date     AICD, DUAL CHAMBER       BRONCHOSCOPY (RIGID OR FLEXIBLE), DIAGNOSTIC N/A 4/30/2019    Procedure: BRONCHOSCOPY, WITH BAL;  Surgeon: Margot Chiang MD;  Location:  GI     CV HEART BIOPSY N/A 3/4/2019    Procedure: Heart Biopsy;  Surgeon: Abhijeet Titus MD;  Location:  HEART CARDIAC CATH LAB     CV HEART BIOPSY N/A 3/25/2019    Procedure: Heart Biopsy;  Surgeon: Yves Rodrigues MD;  Location:   HEART CARDIAC CATH LAB     CV HEART BIOPSY N/A 3/28/2019    Procedure: Heart Cath Heart Biopsy;  Surgeon: Yves Rodrigues MD;  Location:  HEART CARDIAC CATH LAB     CV HEART BIOPSY N/A 4/10/2019    Procedure: CV HEART BIOPSY;  Surgeon: Isiah Mcallister MD;  Location:  HEART CARDIAC CATH LAB     CV HEART BIOPSY N/A 4/24/2019    Procedure: CV HEART BIOPSY;  Surgeon: Isiah Mcallister MD;  Location:  HEART CARDIAC CATH LAB     CV HEART BIOPSY N/A 5/8/2019    Procedure: CV HEART BIOPSY;  Surgeon: Isiah Mcallister MD;  Location:  HEART CARDIAC CATH LAB     CV HEART BIOPSY N/A 6/4/2019    Procedure: CV HEART BIOPSY;  Surgeon: Alton Solomon MD;  Location:  HEART CARDIAC CATH LAB     CV HEART BIOPSY N/A 5/22/2019    Procedure: CV HEART BIOPSY;  Surgeon: Yves Rodrigues MD;  Location:  HEART CARDIAC CATH LAB     CV HEART BIOPSY N/A 7/17/2019    Procedure: CV HEART BIOPSY;  Surgeon: Isiah Mcallister MD;  Location:  HEART CARDIAC CATH LAB     CV HEART BIOPSY N/A 8/13/2019    Procedure: CV HEART BIOPSY;  Surgeon: Jackson Patten MD;  Location:  HEART CARDIAC CATH LAB     CV HEART BIOPSY N/A 10/15/2019    Procedure: CV HEART BIOPSY;  Surgeon: Santino Mckeon MD;  Location:  HEART CARDIAC CATH LAB     CV INTRA-AORTIC BALLOON PUMP INSERTION N/A 2/18/2019    Procedure: Intra-Aortic Balloon;  Surgeon: Alton Solomon MD;  Location:  HEART CARDIAC CATH LAB     CV INTRA-AORTIC BALLOON PUMP INSERTION N/A 2/20/2019    Procedure: Replace subclavian IABP;  Surgeon: Yves Rodrigues MD;  Location:  HEART CARDIAC CATH LAB     CV LEFT HEART CATH N/A 3/19/2019    Procedure: Left Heart Cath;  Surgeon: Yves Rodrigues MD;  Location:  HEART CARDIAC CATH LAB     CV RIGHT HEART CATH N/A 3/19/2019    Procedure: RHC/HBx - Femoral access for 3/19 per Nimisha GONZALEZ;  Surgeon: Yves Rodrigues MD;  Location:  HEART CARDIAC CATH LAB     CV RIGHT HEART CATH N/A 3/25/2019     Procedure: Right Heart Cath;  Surgeon: Yves Rodrigues MD;  Location:  HEART CARDIAC CATH LAB     CV RIGHT HEART CATH N/A 3/28/2019    Procedure: Heart Cath Right Heart Cath;  Surgeon: Yves Rodrigues MD;  Location:  HEART CARDIAC CATH LAB     CV RIGHT HEART CATH N/A 4/24/2019    Procedure: CV RIGHT HEART CATH;  Surgeon: Isiah Mcallister MD;  Location:  HEART CARDIAC CATH LAB     CV RIGHT HEART CATH N/A 3/11/2019    Procedure: ADD ON RHC/HBX;  Surgeon: Alton Solomon MD;  Location:  HEART CARDIAC CATH LAB     CV RIGHT HEART CATH N/A 6/4/2019    Procedure: CV RIGHT HEART CATH;  Surgeon: Alton Solomon MD;  Location:  HEART CARDIAC CATH LAB     CV RIGHT HEART CATH N/A 5/22/2019    Procedure: CV RIGHT HEART CATH;  Surgeon: Yves Rodrigues MD;  Location:  HEART CARDIAC CATH LAB     CV RIGHT HEART CATH N/A 8/13/2019    Procedure: CV RIGHT HEART CATH;  Surgeon: Jackson Patten MD;  Location:  HEART CARDIAC CATH LAB     CV RIGHT HEART CATH N/A 10/15/2019    Procedure: CV RIGHT HEART CATH;  Surgeon: Santino Mckeon MD;  Location:  HEART CARDIAC CATH LAB     INCISION AND DRAINAGE CHEST WASHOUT, COMBINED N/A 3/21/2019    Procedure: Incision And Drainage; Evacuation Right Chest Wall Hematoma;  Surgeon: Dewayne House MD;  Location: [facility] OR     INSERT INTRAAORTIC BALLOON PUMP Left 2/19/2019    Procedure: Insert left  Subclavian Balloon Pump,  Removal Right femoral arterial balloom pump sheath;  Surgeon: Dewayne House MD;  Location: [facility] OR     INSERT INTRAAORTIC BALLOON PUMP Left 2/21/2019    Procedure: SUBCLAVIAN BALLOON PUMP PLACEMENT;  Surgeon: Ben White MD;  Location: [facility] OR     IR PICC PLACEMENT > 5 YRS OF AGE  5/8/2019     TRANSPLANT HEART RECIPIENT N/A 2/24/2019    Procedure: TRANSPLANT HEART RECIPIENT;  Surgeon: Dewayne House MD;  Location: [facility] OR       PREVIOUS ENDOSCOPY:  Colonoscopy 2017    ALLERGIES:     Allergies   Allergen  Reactions     Hydromorphone Other (See Comments)     Significant Delirium     Adhesive Tape Blisters     Tegaderm causes bruises, blisters and extreme irritation.     Lisinopril Other (See Comments)     hypotension     Quinolones Other (See Comments)     Would not rx quinolones until QTc interval improved.      Tobramycin Other (See Comments)     Would not use aminoglycosides as his kidney function is very borderline     Codeine Nausea       PERTINENT MEDICATIONS:    Current Outpatient Medications:      acetaminophen (TYLENOL) 325 MG tablet, Take 2 tablets (650 mg) by mouth every 4 hours as needed for mild pain, Disp: , Rfl:      alendronate (FOSAMAX) 70 MG tablet, Take 1 tablet (70 mg) by mouth every 7 days Take 60 minutes before am meal with 8 oz. water. Remain upright for 30 minutes., Disp: 12 tablet, Rfl: 3     amLODIPine (NORVASC) 5 MG tablet, TAKE 1 TABLET BY MOUTH EVERY DAY, Disp: 30 tablet, Rfl: 2     aspirin (ASA) 81 MG chewable tablet, Take 1 tablet (81 mg) by mouth daily, Disp: , Rfl:      calcium carbonate 600 mg-vitamin D 400 units (CALTRATE) 600-400 MG-UNIT per tablet, Take 1 tablet by mouth 2 times daily (with meals), Disp: , Rfl:      DULoxetine (CYMBALTA) 20 MG EC capsule, Take 20 mg by mouth daily, Disp: , Rfl:      levothyroxine (SYNTHROID/LEVOTHROID) 100 MCG tablet, MON to SAT 1 tablet/day;SUN 0.5 tablet, Disp: 90 tablet, Rfl: 3     melatonin 3 MG tablet, Take 2 tablets (6 mg) by mouth At Bedtime (Patient taking differently: Take 5 mg by mouth At Bedtime ), Disp: , Rfl:      mesalamine (ASACOL HD) 800 MG EC tablet, Take 1 tablet (800 mg) by mouth 3 times daily, Disp: 270 tablet, Rfl: 1     multivitamin w/minerals (THERA-VIT-M) tablet, Take 1 tablet by mouth daily, Disp: , Rfl:      mycophenolate (GENERIC EQUIVALENT) 250 MG capsule, Take 6 capsules (1,500 mg) by mouth 2 times daily, Disp: 360 capsule, Rfl: 11     omeprazole (PRILOSEC) 40 MG DR capsule, Take 1 capsule (40 mg) by mouth daily, Disp:  , Rfl:      rosuvastatin (CRESTOR) 10 MG tablet, TAKE 1 TABLET BY MOUTH EVERY DAY, Disp: 30 tablet, Rfl: 5     senna (SENOKOT) 8.6 MG tablet, Take 2 tablets by mouth 2 times daily as needed for constipation, Disp: , Rfl:      tacrolimus (GENERIC EQUIVALENT) 1 MG capsule, Take 3 capsules (3 mg) by mouth 2 times daily, Disp: 180 capsule, Rfl: 11     albuterol (PROAIR HFA/PROVENTIL HFA/VENTOLIN HFA) 108 (90 Base) MCG/ACT inhaler, Inhale 2 puffs into the lungs every 6 hours as needed for shortness of breath / dyspnea or wheezing, Disp: , Rfl:      cetirizine (ZYRTEC) 10 MG tablet, Take 10 mg by mouth daily, Disp: , Rfl:      fluticasone (FLOVENT HFA) 220 MCG/ACT Inhaler, Inhale 2 puffs into the lungs 2 times daily, Disp: , Rfl:      magnesium oxide (MAG-OX) 400 MG tablet, Take 1 tablet (400 mg) by mouth 2 times daily (Patient not taking: Reported on 2019), Disp: 180 tablet, Rfl: 3     ondansetron (ZOFRAN-ODT) 4 MG ODT tab, DISSOLVE 1 TABLET ON THE TONGUE EVERY EIGHT HOURS AS NEEDED, Disp: , Rfl: 3     polyethylene glycol (MIRALAX/GLYCOLAX) powder, Take 1 capful by mouth daily, Disp: , Rfl:      valGANciclovir (VALCYTE) 450 MG tablet, Take 1 tablet (450 mg) by mouth daily (Patient not taking: Reported on 2019), Disp: 60 tablet, Rfl: 2    SOCIAL HISTORY:  Social History     Socioeconomic History     Marital status: Single     Spouse name: Not on file     Number of children: Not on file     Years of education: Not on file     Highest education level: Not on file   Occupational History     Not on file   Social Needs     Financial resource strain: Not on file     Food insecurity:     Worry: Not on file     Inability: Not on file     Transportation needs:     Medical: Not on file     Non-medical: Not on file   Tobacco Use     Smoking status: Former Smoker     Packs/day: 0.00     Years: 10.00     Pack years: 0.00     Start date: 1980     Last attempt to quit: 2008     Years since quittin.9      "Smokeless tobacco: Never Used   Substance and Sexual Activity     Alcohol use: Yes     Alcohol/week: 1.0 - 2.0 standard drinks     Types: 1 - 2 Cans of beer per week     Frequency: Monthly or less     Drinks per session: 1 or 2     Binge frequency: Less than monthly     Drug use: No     Sexual activity: Not on file   Lifestyle     Physical activity:     Days per week: Not on file     Minutes per session: Not on file     Stress: Not on file   Relationships     Social connections:     Talks on phone: Not on file     Gets together: Not on file     Attends [employer] service: Not on file     Active member of club or organization: Not on file     Attends meetings of clubs or organizations: Not on file     Relationship status: Not on file     Intimate partner violence:     Fear of current or ex partner: Not on file     Emotionally abused: Not on file     Physically abused: Not on file     Forced sexual activity: Not on file   Other Topics Concern     Not on file   Social History Narrative    Everton is a retired . [place]. He has no lawn care responsibilities. He will be staying with his folks during his post-hospital early transplant care time. No history of TB exposures.        FAMILY HISTORY:  Family History   Problem Relation Age of Onset     Endometrial Cancer Mother      Osteoporosis Mother      Hypertension Father      Endometrial Cancer Maternal Grandmother      Hip fracture No family hx of      Nephrolithiasis No family hx of        Past/family/social history reviewed and no changes    PHYSICAL EXAMINATION:  Constitutional: aaox3, cooperative, pleasant, not dyspneic/diaphoretic, no acute distress  Vitals reviewed: /77 (BP Location: Left arm, Patient Position: Sitting, Cuff Size: Adult Regular)   Pulse 111   Temp 98.7  F (37.1  C) (Oral)   Resp 16   Ht 1.689 m (5' 6.5\")   Wt 67.9 kg (149 lb 9.6 oz)   SpO2 100%   BMI 23.78 kg/m     Wt:   Wt Readings " from Last 2 Encounters:   11/26/19 67.9 kg (149 lb 9.6 oz)   10/22/19 60.8 kg (134 lb)      Eyes: Sclera anicteric/injected  Ears/nose/mouth/throat: Normal oropharynx without ulcers or exudate, mucus membranes moist, hearing intact  Neck: supple  CV: No edema  Respiratory: Unlabored breathing  Abd: Nondistended, +bs, no hepatosplenomegaly, nontender, no peritoneal signs  Skin: warm, perfused, no jaundice  Psych: Normal affect  MSK: Normal gait    PERTINENT STUDIES:  Most recent CBC:  Recent Labs   Lab Test 11/22/19  0933 11/14/19  0929   WBC 3.6* 4.0   HGB 9.1* 8.7*   HCT 29.0* 27.9*    232     Most recent hepatic panel:  Recent Labs   Lab Test 11/22/19  0933 11/14/19  0929   ALT 26 24   AST 21 23     Most recent creatinine:  Recent Labs   Lab Test 11/22/19 0933 11/14/19  0929   CR 1.21 1.12       I performed a history and physical examination of the above patient and discussed the management with Dr. Jensen on 11/26/2019. I reviewed the note and there are no changes to the past medical, family or social history.  A complete 10 point review of systems was obtained. Please see the HPI for pertinent positives and negatives. All other systems were reviewed and were found to be negative.      I agree with the documented findings and plan of care as outlined.     Genevieve Allen MD  GI Attending  Pager: [phone]

## 2019-11-26 NOTE — NURSING NOTE
"Chief Complaint   Patient presents with     Follow Up     Ulcerative Pancolitis       Vitals:    11/26/19 1604   BP: 120/77   BP Location: Left arm   Patient Position: Sitting   Cuff Size: Adult Regular   Pulse: 111   Resp: 16   Temp: 98.7  F (37.1  C)   TempSrc: Oral   SpO2: 100%   Weight: 67.9 kg (149 lb 9.6 oz)   Height: 1.689 m (5' 6.5\")       Body mass index is 23.78 kg/m .      Keiko Rosas LPN                          "

## 2019-11-26 NOTE — PROGRESS NOTES
IBD CLINIC VISIT     CC/REFERRING MD:  Elin Jensen  REASON FOR CONSULTATION: Establish care for UC Pancolitis     IBD HISTORY  IBD type: UC pancolitis (patient report)  Age at diagnosis: (diagnosed >10 years ago)  Endoscopic extent of disease: Quiescent disease on last colonoscopy ()  Histologic: Quiescent on last colonoscopy ()  Current IBD medications: Asacol (2019 - present)  Prior IBD surgeries: None  Prior IBD Medications:  - Steroids (many years ago)  - Balsalazide ( - 2019)     DRUG MONITORING  TPMT enzyme activity:  N/A     6-TGN/6-MMPN levels: N/A     Biologic concentration: N/A     DISEASE ASSESSMENT  Labs        Recent Labs   Lab Test 19  1702 05/15/19  0940 19  0536   CRP <2.9 <2.9 69.0*   SED 18  --  37*      Fecal calprotectin: To be collected  Endoscopy: Quiescent , colonoscopy to be scheduled  Enterography: No recent  C diff: To be collected     Yadav score: 0 (partial)  Stool freq: 0 (baseline stools frequency)  Rectal bleedin (None)  PGA: 0 (normal)  Endoscopy: Not done    Remission: <3   Mild disease: 3-5  Moderate disease: 6-10  Severe disease: >10     ASSESSMENT/PLAN:  Everton Larios is a 54 year old male with a history of ASD (s/p repair in childhood), NICM 2/p OHT (19) on tacrolimus & MMF for IST, ulcerative pancolitis (diagnosed  on asacol), recent hospital admission for CMV viremia and CDI treated with PO vancomycin, Luisito's Esophagus who is here for follow-up management of his UC.     #. Ulcerative pancolitis:  Previoulsy quiescent since 2017 on 5-ASA monotherapy (balsalazide through 2019, now asacol). Patient was following with [facility]. Recent flare triggered by C Diff and CMV viremia. Diarrhea has resolved and now in clinical remission.    #. H/o CDI:   Diarrhea & abdominal pain with positive PCR 19. Treated with 10 day course of PO vancomycin QID with resolution of diarrhea. Continues to do well without symptoms concerning for  recurrence.    #. 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 Following with Dr. Shukla with ID. He was treated with VGCV with improvement in his CMV viral log -  Due to financial strain and improved symptoms and low viral log he has been moved to weekly monitoring with CMV PCR (not currently taking VGCV). Last CMV quant 286 (increased from <137).     #. Elevated alkaline phosphatase:  Rising since 6/17/19, now 185. Will evaluate for PSC with MRCP given history of UC. Total bilirubin, AST/ALT remain normal.     #. 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 Next due 2020.     #. Osteoporosis with L1 compression fracture:  Follows with PCP. Last DEXA 2018. On calcium and vitamin D, follows endocrinology and recently restarted alendronate.     RECOMMENDATIONS:  -- Schedule a MRCP (MRI of your bile ducts)  -- Schedule colonoscopy for surveillance (dysplasia)  -- Continue asacol 800mg PO TID  -- Continue asacol without changes  -- Will message ID about mildly elevated CMV  -- If any fevers, chills, abdominal pain, nausea, vomiting or diarrhea please call or go to the emergency department  -- PCP to review and update immunizations as needed     IBD Health Care Maintenance:     Vaccinations:    -- Influenza (every year): Due 2019  -- TdaP (every 10 years): 7/2012  -- Pneumococcal Pneumonia (once plus booster at 5 years): Pneumovax 9/28/19, PCV 13 9/26/18  -- Yearly assessment for latent Tb (verbal screening and exam, PPD or QuantiFERON-Tb testing)     One time confirmation of immunity or serologies:  -- Hepatitis A (serologies or immunizations):   -- Hepatitis B (serologies or immunizations): Immune, titer 1.15  -- Varicella  -- MMR  -- Meningococcal meningitis (all patients at risk for meningitis)  -- Due to the immunosuppression in this patient, I would not  advise administration of live vaccines such as varicella/VZV, intranasal influenza, MMR, or yellow fever vaccine (if travelling).       Bone mineral density screening   -- Recommend all patients supplement with calcium and vitamin D     Cancer Screening:  Colon cancer screening:   Given >10 years of pancolonic disease, recommend patient undergo regular dysplasia surveillance   Next dysplasia screening is recommended 2019 (to be scheduled).     Skin cancer screening: Annual visual exam of skin by dermatologist since patient is immunocompromised     Misc:  -- Avoid tobacco use  -- Avoid NSAIDs as there is potentially a 25% chance of causing an IBD flare     Return to clinic in 6 weeks     Pt care plan discussed with Dr. Allen, GI staff physician.     Jocelynn Jensen MD  GI Fellow  [phone]    HPI:   Everton Larios is a 54 year old male with a history of ASD (s/p repair in childhood), NICM 2/p OHT (2/24/19) on tacrolimus & MMF for IST, ulcerative pancolitis (diagnosed 2005 on balsalazide), Luisito's Esophagus who was referred to our clinic to establish care for his ulcerative colitis.    Since discharge from the hospital he continues to do well. States his diarrhea has completely resolved. No having 1-2 soft brown bowel movements daily with 0% blood. Remains on Asacol without complications. No further abdominal pain, nausea, vomiting or fevers. Does mention that he feels that when his CMV titer is slightly higher he will have a second softer bowel movement (thinks this correlates). Following with cardiology, ID and endocrinology.    No myalgias, arthralgias, eye redness/pain or mouth sores.    ROS:    No fevers or chills  No weight loss  No blurry vision, double vision or change in vision  No sore throat  No lymphadenopathy  No headache, paraesthesias, or weakness in a limb  No shortness of breath or wheezing  No chest pain or pressure  No arthralgias or myalgias  No rashes or skin changes  No odynophagia or  dysphagia  No BRBPR, hematochezia, melena  No dysuria, frequency or urgency  No hot/cold intolerance or polyria  No anxiety or depression    Extra intestinal manifestations of IBD:  No uveitis/episcleritis  No aphthous ulcers   No arthritis   No erythema nodosum/pyoderma gangrenosum.     PERTINENT PAST MEDICAL HISTORY:  Past Medical History:   Diagnosis Date     Alcohol abuse      Pierce's esophagus 10/4/2018     Chronic rhinitis 10/4/2018     Chronic systolic heart failure (H) 10/4/2018     Depression 10/4/2018     Diastolic dysfunction      DJD (degenerative joint disease) - neck 10/4/2018     H/O congenital atrial septal defect (ASD) repair at age 3 10/4/2018     Hypothyroidism due to medication 10/4/2018     ICD, [employer] 2008; gen change 2/2018 10/4/2018     Intermittent asthma without complication 10/4/2018     Nonischemic cardiomyopathy (H) 10/4/2018     On amiodarone therapy 10/4/2018     Paroxysmal atrial fibrillation (H) 10/4/2018     S/P ablation of atrial fibrillation 10/4/2018     Systolic heart failure (H)      Ulcerative colitis (H) 2005     Ventricular tachycardia (H) 10/4/2018       PREVIOUS SURGERIES:  Past Surgical History:   Procedure Laterality Date     AICD, DUAL CHAMBER       BRONCHOSCOPY (RIGID OR FLEXIBLE), DIAGNOSTIC N/A 4/30/2019    Procedure: BRONCHOSCOPY, WITH BAL;  Surgeon: Margot Chiang MD;  Location:  GI     CV HEART BIOPSY N/A 3/4/2019    Procedure: Heart Biopsy;  Surgeon: Abhijeet Titus MD;  Location:  HEART CARDIAC CATH LAB     CV HEART BIOPSY N/A 3/25/2019    Procedure: Heart Biopsy;  Surgeon: Yves Rodrigues MD;  Location:  HEART CARDIAC CATH LAB     CV HEART BIOPSY N/A 3/28/2019    Procedure: Heart Cath Heart Biopsy;  Surgeon: Yves Rodrigues MD;  Location:  HEART CARDIAC CATH LAB     CV HEART BIOPSY N/A 4/10/2019    Procedure: CV HEART BIOPSY;  Surgeon: Isiah Mcallister MD;  Location:  HEART CARDIAC CATH LAB     CV HEART BIOPSY N/A  4/24/2019    Procedure: CV HEART BIOPSY;  Surgeon: Isiah Mcallister MD;  Location:  HEART CARDIAC CATH LAB     CV HEART BIOPSY N/A 5/8/2019    Procedure: CV HEART BIOPSY;  Surgeon: Isiah Mcallister MD;  [facility]     CV HEART BIOPSY N/A 6/4/2019    Procedure: CV HEART BIOPSY;  Surgeon: Alton Solomon MD;  Location:  HEART CARDIAC CATH LAB     CV HEART BIOPSY N/A 5/22/2019    Procedure: CV HEART BIOPSY;  Surgeon: Yves Rodrigues MD;  Location:  HEART CARDIAC CATH LAB     CV HEART BIOPSY N/A 7/17/2019    Procedure: CV HEART BIOPSY;  Surgeon: Isiah Mcallister MD;  Location:  HEART CARDIAC CATH LAB     CV HEART BIOPSY N/A 8/13/2019    Procedure: CV HEART BIOPSY;  Surgeon: Jackson Patten MD;  Location:  HEART CARDIAC CATH LAB     CV HEART BIOPSY N/A 10/15/2019    Procedure: CV HEART BIOPSY;  Surgeon: Santino Mckeon MD;  Location:  HEART CARDIAC CATH LAB     CV INTRA-AORTIC BALLOON PUMP INSERTION N/A 2/18/2019    Procedure: Intra-Aortic Balloon;  Surgeon: Alton Solomon MD;  Location:  HEART CARDIAC CATH LAB     CV INTRA-AORTIC BALLOON PUMP INSERTION N/A 2/20/2019    Procedure: Replace subclavian IABP;  Surgeon: Yves Rodrigues MD;  Location:  HEART CARDIAC CATH LAB     CV LEFT HEART CATH N/A 3/19/2019    Procedure: Left Heart Cath;  Surgeon: Yves Rodrigues MD;  Location:  HEART CARDIAC CATH LAB     CV RIGHT HEART CATH N/A 3/19/2019    Procedure: RHC/HBx - Femoral access for 3/19 per Nimisha GONZALEZ;  Surgeon: Yves Rodrigues MD;  Location:  HEART CARDIAC CATH LAB     CV RIGHT HEART CATH N/A 3/25/2019    Procedure: Right Heart Cath;  Surgeon: Yves Rodrigues MD;  Location:  HEART CARDIAC CATH LAB     CV RIGHT HEART CATH N/A 3/28/2019    Procedure: Heart Cath Right Heart Cath;  Surgeon: Yves Rodrigues MD;  Location:  HEART CARDIAC CATH LAB     CV RIGHT HEART CATH N/A 4/24/2019    Procedure: CV RIGHT HEART CATH;   Surgeon: Isiah Mcallister MD;  Location:  HEART CARDIAC CATH LAB     CV RIGHT HEART CATH N/A 3/11/2019    Procedure: ADD ON RHC/HBX;  Surgeon: Alton Solomon MD;  Location:  HEART CARDIAC CATH LAB     CV RIGHT HEART CATH N/A 6/4/2019    Procedure: CV RIGHT HEART CATH;  Surgeon: Alton Solomon MD;  Location:  HEART CARDIAC CATH LAB     CV RIGHT HEART CATH N/A 5/22/2019    Procedure: CV RIGHT HEART CATH;  Surgeon: Yves Rodrigues MD;  Location:  HEART CARDIAC CATH LAB     CV RIGHT HEART CATH N/A 8/13/2019    Procedure: CV RIGHT HEART CATH;  Surgeon: Jackson Patten MD;  Location:  HEART CARDIAC CATH LAB     CV RIGHT HEART CATH N/A 10/15/2019    Procedure: CV RIGHT HEART CATH;  Surgeon: Santino Mckeon MD;  Location:  HEART CARDIAC CATH LAB     INCISION AND DRAINAGE CHEST WASHOUT, COMBINED N/A 3/21/2019    Procedure: Incision And Drainage; Evacuation Right Chest Wall Hematoma;  Surgeon: Dewayne House MD;  [facility]     INSERT INTRAAORTIC BALLOON PUMP Left 2/19/2019    Procedure: Insert left  Subclavian Balloon Pump,  Removal Right femoral arterial balloom pump sheath;  Surgeon: Dewayne House MD;  [facility]     INSERT INTRAAORTIC BALLOON PUMP Left 2/21/2019    Procedure: SUBCLAVIAN BALLOON PUMP PLACEMENT;  Surgeon: Ben White MD;  [facility]     IR PICC PLACEMENT > 5 YRS OF AGE  5/8/2019     TRANSPLANT HEART RECIPIENT N/A 2/24/2019    Procedure: TRANSPLANT HEART RECIPIENT;  Surgeon: Dewayne House MD;  [facility]       PREVIOUS ENDOSCOPY:  Colonoscopy 2017    ALLERGIES:     Allergies   Allergen Reactions     Hydromorphone Other (See Comments)     Significant Delirium     Adhesive Tape Blisters     Tegaderm causes bruises, blisters and extreme irritation.     Lisinopril Other (See Comments)     hypotension     Quinolones Other (See Comments)     Would not rx quinolones until QTc interval improved.      Tobramycin  Other (See Comments)     Would not use aminoglycosides as his kidney function is very borderline     Codeine Nausea       PERTINENT MEDICATIONS:    Current Outpatient Medications:      acetaminophen (TYLENOL) 325 MG tablet, Take 2 tablets (650 mg) by mouth every 4 hours as needed for mild pain, Disp: , Rfl:      alendronate (FOSAMAX) 70 MG tablet, Take 1 tablet (70 mg) by mouth every 7 days Take 60 minutes before am meal with 8 oz. water. Remain upright for 30 minutes., Disp: 12 tablet, Rfl: 3     amLODIPine (NORVASC) 5 MG tablet, TAKE 1 TABLET BY MOUTH EVERY DAY, Disp: 30 tablet, Rfl: 2     aspirin (ASA) 81 MG chewable tablet, Take 1 tablet (81 mg) by mouth daily, Disp: , Rfl:      calcium carbonate 600 mg-vitamin D 400 units (CALTRATE) 600-400 MG-UNIT per tablet, Take 1 tablet by mouth 2 times daily (with meals), Disp: , Rfl:      DULoxetine (CYMBALTA) 20 MG EC capsule, Take 20 mg by mouth daily, Disp: , Rfl:      levothyroxine (SYNTHROID/LEVOTHROID) 100 MCG tablet, MON to SAT 1 tablet/day;SUN 0.5 tablet, Disp: 90 tablet, Rfl: 3     melatonin 3 MG tablet, Take 2 tablets (6 mg) by mouth At Bedtime (Patient taking differently: Take 5 mg by mouth At Bedtime ), Disp: , Rfl:      mesalamine (ASACOL HD) 800 MG EC tablet, Take 1 tablet (800 mg) by mouth 3 times daily, Disp: 270 tablet, Rfl: 1     multivitamin w/minerals (THERA-VIT-M) tablet, Take 1 tablet by mouth daily, Disp: , Rfl:      mycophenolate (GENERIC EQUIVALENT) 250 MG capsule, Take 6 capsules (1,500 mg) by mouth 2 times daily, Disp: 360 capsule, Rfl: 11     omeprazole (PRILOSEC) 40 MG DR capsule, Take 1 capsule (40 mg) by mouth daily, Disp: , Rfl:      rosuvastatin (CRESTOR) 10 MG tablet, TAKE 1 TABLET BY MOUTH EVERY DAY, Disp: 30 tablet, Rfl: 5     senna (SENOKOT) 8.6 MG tablet, Take 2 tablets by mouth 2 times daily as needed for constipation, Disp: , Rfl:      tacrolimus (GENERIC EQUIVALENT) 1 MG capsule, Take 3 capsules (3 mg) by mouth 2 times daily, Disp:  180 capsule, Rfl: 11     albuterol (PROAIR HFA/PROVENTIL HFA/VENTOLIN HFA) 108 (90 Base) MCG/ACT inhaler, Inhale 2 puffs into the lungs every 6 hours as needed for shortness of breath / dyspnea or wheezing, Disp: , Rfl:      cetirizine (ZYRTEC) 10 MG tablet, Take 10 mg by mouth daily, Disp: , Rfl:      fluticasone (FLOVENT HFA) 220 MCG/ACT Inhaler, Inhale 2 puffs into the lungs 2 times daily, Disp: , Rfl:      magnesium oxide (MAG-OX) 400 MG tablet, Take 1 tablet (400 mg) by mouth 2 times daily (Patient not taking: Reported on 2019), Disp: 180 tablet, Rfl: 3     ondansetron (ZOFRAN-ODT) 4 MG ODT tab, DISSOLVE 1 TABLET ON THE TONGUE EVERY EIGHT HOURS AS NEEDED, Disp: , Rfl: 3     polyethylene glycol (MIRALAX/GLYCOLAX) powder, Take 1 capful by mouth daily, Disp: , Rfl:      valGANciclovir (VALCYTE) 450 MG tablet, Take 1 tablet (450 mg) by mouth daily (Patient not taking: Reported on 2019), Disp: 60 tablet, Rfl: 2    SOCIAL HISTORY:  Social History     Socioeconomic History     Marital status: Single     Spouse name: Not on file     Number of children: Not on file     Years of education: Not on file     Highest education level: Not on file   Occupational History     Not on file   Social Needs     Financial resource strain: Not on file     Food insecurity:     Worry: Not on file     Inability: Not on file     Transportation needs:     Medical: Not on file     Non-medical: Not on file   Tobacco Use     Smoking status: Former Smoker     Packs/day: 0.00     Years: 10.00     Pack years: 0.00     Start date: 1980     Last attempt to quit: 2008     Years since quittin.9     Smokeless tobacco: Never Used   Substance and Sexual Activity     Alcohol use: Yes     Alcohol/week: 1.0 - 2.0 standard drinks     Types: 1 - 2 Cans of beer per week     Frequency: Monthly or less     Drinks per session: 1 or 2     Binge frequency: Less than monthly     Drug use: No     Sexual activity: Not on file   Lifestyle      "Physical activity:     Days per week: Not on file     Minutes per session: Not on file     Stress: Not on file   Relationships     Social connections:     Talks on phone: Not on file     Gets together: Not on file     Attends Gnosticist service: Not on file     Active member of club or organization: Not on file     Attends meetings of clubs or organizations: Not on file     Relationship status: Not on file     Intimate partner violence:     Fear of current or ex partner: Not on file     Emotionally abused: Not on file     Physically abused: Not on file     Forced sexual activity: Not on file   Other Topics Concern     Not on file   Social History Narrative    Everton is a retired . He lives by himself in a townhouse in [place]. He has no lawn care responsibilities. He will be staying with his folks during his post-hospital early transplant care time. No history of TB exposures.        FAMILY HISTORY:  Family History   Problem Relation Age of Onset     Endometrial Cancer Mother      Osteoporosis Mother      Hypertension Father      Endometrial Cancer Maternal Grandmother      Hip fracture No family hx of      Nephrolithiasis No family hx of        Past/family/social history reviewed and no changes    PHYSICAL EXAMINATION:  Constitutional: aaox3, cooperative, pleasant, not dyspneic/diaphoretic, no acute distress  Vitals reviewed: /77 (BP Location: Left arm, Patient Position: Sitting, Cuff Size: Adult Regular)   Pulse 111   Temp 98.7  F (37.1  C) (Oral)   Resp 16   Ht 1.689 m (5' 6.5\")   Wt 67.9 kg (149 lb 9.6 oz)   SpO2 100%   BMI 23.78 kg/m    Wt:   Wt Readings from Last 2 Encounters:   11/26/19 67.9 kg (149 lb 9.6 oz)   10/22/19 60.8 kg (134 lb)      Eyes: Sclera anicteric/injected  Ears/nose/mouth/throat: Normal oropharynx without ulcers or exudate, mucus membranes moist, hearing intact  Neck: supple  CV: No edema  Respiratory: Unlabored breathing  Abd: Nondistended, +bs, no " hepatosplenomegaly, nontender, no peritoneal signs  Skin: warm, perfused, no jaundice  Psych: Normal affect  MSK: Normal gait    PERTINENT STUDIES:  Most recent CBC:  Recent Labs   Lab Test 11/22/19 0933 11/14/19 0929   WBC 3.6* 4.0   HGB 9.1* 8.7*   HCT 29.0* 27.9*    232     Most recent hepatic panel:  Recent Labs   Lab Test 11/22/19 0933 11/14/19 0929   ALT 26 24   AST 21 23     Most recent creatinine:  Recent Labs   Lab Test 11/22/19 0933 11/14/19 0929   CR 1.21 1.12             Answers for HPI/ROS submitted by the patient on 11/26/2019   General Symptoms: No  Skin Symptoms: No  HENT Symptoms: No  EYE SYMPTOMS: No  HEART SYMPTOMS: Yes  LUNG SYMPTOMS: No  INTESTINAL SYMPTOMS: No  URINARY SYMPTOMS: No  REPRODUCTIVE SYMPTOMS: No  SKELETAL SYMPTOMS: Yes  BLOOD SYMPTOMS: No  NERVOUS SYSTEM SYMPTOMS: No  MENTAL HEALTH SYMPTOMS: No  Chest pain or pressure: Yes  Fast or irregular heartbeat: No  Pain in legs with walking: Yes  Trouble breathing while lying down: No  Fingers or toes appear blue: No  High blood pressure: No  Low blood pressure: No  Fainting: No  Murmurs: No  Pacemaker: No  Varicose veins: No  Edema or swelling: No  Wake up at night with shortness of breath: No  Light-headedness: No  Exercise intolerance: No  Back pain: Yes  Muscle aches: Yes  Neck pain: Yes  Swollen joints: Yes  Joint pain: Yes  Bone pain: Yes  Muscle cramps: Yes  Muscle weakness: No  Joint stiffness: Yes  Bone fracture: No

## 2019-11-26 NOTE — PATIENT INSTRUCTIONS
It was a pleasure taking care of you today.  I've included a brief summary of our discussion and care plan from today's visit below.  Please review this information with your primary care provider.  _____________________________________________________________________    My recommendations are summarized as follows:    - Schedule a MRCP (MRI of your bile ducts)  - Schedule colonoscopy for surveillance (dysplasia)  - Continue your asacol without changes  - If any fevers, chills, abdominal pain, nausea, vomiting or diarrhea please call or go to the emergency department  - If you have any questions you can contact our GI nurse Lauren at [phone]    Return to GI Clinic in 3 months to review your progress.    ____________________________________________________________________    Who do I call with any questions after my visit?  Please be in touch if there are any further questions that arise following today's visit.  There are multiple ways to contact your gastroenterology care team.        During business hours, you may reach a Gastroenterology nurse at [phone] and choose option 3.         To schedule or reschedule an appointment, please call [phone].       You can always send a secure message through [employer].  [employer] messages are answered by your nurse or doctor typically within 24 hours.  Please allow extra time on weekends and holidays.        For urgent/emergent questions after business hours, you may reach the on-call GI Fellow by contacting the [facility] at [phone].     How will I get the results of any tests ordered?    You will receive all of your results.  If you have signed up for [employer], any tests ordered at your visit will be available to you after your physician reviews them.  Typically this takes 1-2 weeks.  If there are urgent results that require a change in your care plan, your physician or nurse will call you to discuss the next steps.      What is  [employer]?  [employer] is a secure way for you to access all of your healthcare records from the [facility].  It is a web based computer program, so you can sign on to it from any location.  It also allows you to send secure messages to your care team.  I recommend signing up for [employer] access if you have not already done so and are comfortable with using a computer.      How to I schedule a follow-up visit?  If you did not schedule a follow-up visit today, please call [phone] to schedule a follow-up office visit.        Sincerely,    Jocelynn Jensen MD  GI Fellow  [facility]  Division of Gastroenterology, Hepatology & Nutrition

## 2019-11-27 ENCOUNTER — TELEPHONE (OUTPATIENT)
Dept: TRANSPLANT | Facility: CLINIC | Age: 54
End: 2019-11-27

## 2019-11-27 NOTE — TELEPHONE ENCOUNTER
. Per Dr. Shukla, As long as CMV continues to fluctuate at this low level, I would keep him off of valcyte    We can stop weekly therapy, we should be able to check as needed or per your protocol.     Tacro level 7.3. Goal 6-8. Current dose 3 mg twice. No changes at this time.     Pt called, no answer. VM left with the above information. Pt encouraged to call with questions or concerns.

## 2019-12-02 ENCOUNTER — TELEPHONE (OUTPATIENT)
Dept: INFECTIOUS DISEASES | Facility: CLINIC | Age: 54
End: 2019-12-02

## 2019-12-02 NOTE — TELEPHONE ENCOUNTER
Patient contacted and reminded of upcoming appointment.  Patient confirmed they will be attending.  Patient instructed to bring updated medications list to appointment.      Desiree Osuna CMA     12/2/2019 4:24 PM

## 2019-12-03 ENCOUNTER — RECORDS - HEALTHEAST (OUTPATIENT)
Dept: ADMINISTRATIVE | Facility: OTHER | Age: 54
End: 2019-12-03

## 2019-12-03 ENCOUNTER — OFFICE VISIT (OUTPATIENT)
Dept: INFECTIOUS DISEASES | Facility: CLINIC | Age: 54
End: 2019-12-03
Attending: INTERNAL MEDICINE
Payer: COMMERCIAL

## 2019-12-03 VITALS
BODY MASS INDEX: 23.59 KG/M2 | SYSTOLIC BLOOD PRESSURE: 123 MMHG | OXYGEN SATURATION: 100 % | HEART RATE: 109 BPM | WEIGHT: 150.3 LBS | TEMPERATURE: 98.4 F | DIASTOLIC BLOOD PRESSURE: 79 MMHG | HEIGHT: 67 IN

## 2019-12-03 DIAGNOSIS — B25.9 CYTOMEGALOVIRUS (CMV) VIREMIA (H): Primary | ICD-10-CM

## 2019-12-03 DIAGNOSIS — Z23 NEED FOR SHINGLES VACCINE: ICD-10-CM

## 2019-12-03 DIAGNOSIS — Z94.1 TRANSPLANTED HEART (H): ICD-10-CM

## 2019-12-03 DIAGNOSIS — Z23 NEED FOR VACCINATION FOR STREP PNEUMONIAE: ICD-10-CM

## 2019-12-03 LAB
ALBUMIN SERPL-MCNC: 3.8 G/DL (ref 3.4–5)
ALP SERPL-CCNC: 187 U/L (ref 40–150)
ALT SERPL W P-5'-P-CCNC: 25 U/L (ref 0–70)
ANION GAP SERPL CALCULATED.3IONS-SCNC: 6 MMOL/L (ref 3–14)
AST SERPL W P-5'-P-CCNC: 21 U/L (ref 0–45)
BILIRUB SERPL-MCNC: 0.4 MG/DL (ref 0.2–1.3)
BUN SERPL-MCNC: 31 MG/DL (ref 7–30)
CALCIUM SERPL-MCNC: 8.5 MG/DL (ref 8.5–10.1)
CHLORIDE SERPL-SCNC: 108 MMOL/L (ref 94–109)
CO2 SERPL-SCNC: 23 MMOL/L (ref 20–32)
CREAT SERPL-MCNC: 1.33 MG/DL (ref 0.66–1.25)
ERYTHROCYTE [DISTWIDTH] IN BLOOD BY AUTOMATED COUNT: 14.1 % (ref 10–15)
GFR SERPL CREATININE-BSD FRML MDRD: 59 ML/MIN/{1.73_M2}
GLUCOSE SERPL-MCNC: 95 MG/DL (ref 70–99)
HCT VFR BLD AUTO: 30.9 % (ref 40–53)
HGB BLD-MCNC: 9.8 G/DL (ref 13.3–17.7)
MCH RBC QN AUTO: 27.4 PG (ref 26.5–33)
MCHC RBC AUTO-ENTMCNC: 31.7 G/DL (ref 31.5–36.5)
MCV RBC AUTO: 86 FL (ref 78–100)
PLATELET # BLD AUTO: 241 10E9/L (ref 150–450)
POTASSIUM SERPL-SCNC: 4.5 MMOL/L (ref 3.4–5.3)
PROT SERPL-MCNC: 7 G/DL (ref 6.8–8.8)
RBC # BLD AUTO: 3.58 10E12/L (ref 4.4–5.9)
SODIUM SERPL-SCNC: 137 MMOL/L (ref 133–144)
WBC # BLD AUTO: 3.3 10E9/L (ref 4–11)

## 2019-12-03 PROCEDURE — 80197 ASSAY OF TACROLIMUS: CPT

## 2019-12-03 PROCEDURE — 85027 COMPLETE CBC AUTOMATED: CPT

## 2019-12-03 PROCEDURE — G0463 HOSPITAL OUTPT CLINIC VISIT: HCPCS | Mod: ZF

## 2019-12-03 PROCEDURE — 80053 COMPREHEN METABOLIC PANEL: CPT

## 2019-12-03 ASSESSMENT — PAIN SCALES - GENERAL: PAINLEVEL: NO PAIN (0)

## 2019-12-03 ASSESSMENT — MIFFLIN-ST. JEOR: SCORE: 1462.45

## 2019-12-03 NOTE — LETTER
12/3/2019      RE: Everton Larios  [address] [id number]     [facility]    Transplant Infectious Diseases Outpatient Progress note      Patient:  Everton Larios, Date of birth 1965, Medical record number [id number]  Date of Visit:  12/04/2019          Recommendations:   1. Will repeat CMV in one month or if he's symptomatic.   2. Good candidate for shingrix and pneumovax vaccines but he's waiting for next year for insurance issues.      RTC: as needed.         Summary of Presentation:   Transplants:  2/24/2019 (Heart), Postoperative day:  283     This patient is a 54 year old male with UC and congenital heart disease s/p OHT in 2/2019 with methylprednisolone induction and TAC/MMF for maintenance.   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         Active Problems and Infectious Diseases Issues:   1. CMV viremia.   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   He was treated with GCV with neutropenia adverse event.   Eventually we stopped VGCV around 10/17/2019 when VL was <2.1 log. No maintenance doses due to financial strain.     Starting 11/22/2019 he's been experiencing low grade viremia of 2.5 log. He is asymptomatic with no evidence of end-organ involvement including the GI tract.     Will check in one month or earlier if symptomatic.     2. High alk phos  Scheduled for MRCP and colonoscopy by GI for history of IBD.         Old Problems and Infectious Diseases Issues:   1. Drug induced neutropenia resolved after stopping bactrim and VGCV and with couple of doses of G-CSF.  2. P aeruginosa BSI 4/29/2019 with P aeruginosa HCAP and pulmonary nodules in the setting of febrile neutropenia. We treated him for severe pseudomonal pneumonia with 4 week  course of ABx till 5/28/2019. He was also treated with IV micafungin till 5/28/2019 due to pulmonary nodules and high risk for aspergillus given positive BD glucan. CT scan continues to improve while off of antimicrobilals as evidenced on a CT done this admission on 9/24/2019 while off of ABx and antifungals.    3. QTc prolongation. Prevented the use of azole and fluoroquinolones.   4. CDI 9/2019 treated with 2 weeks of PO vancomycin.       Other Infectious Disease issues include:  - QTc 532 9/23/2019.   - PCP prophylaxis: off of bactrim since 4/26/2019. Was given pentamidine till 8/2019.   - Serostatus: CMV D+/R+, EBV D+/R+, HSV1+/2-, VZV +                - Immunization status: good candidate for shingrix and pneumovax but he's waiting for it to be covered by his insurance.    - Gamma globulin status: 358 5/1/2019.       Attestation:  I interviewed the patient and obtained history from the patient, and by reviewing the patient's chart including outside records, microbiological data, and radiological data. All data are summarized in this notes.  Ric Shukla MD   Pager: [phone]  12/04/2019         Interim History:   No diarrhea no N/V.   He c/o flatulence and burping but no other complaints.         HPI:       Transplants:  2/24/2019 (Heart); Postoperative day:  283    This patient is a 54 year old male with congenital heart disease s/p OHT in 2/2019 with methylprednisolone induction and TAC/MMF/prednisone for maintenance.      The course was complicated by neutropenia late 4/2019. He was then admitted with fever, neutropenia dyspnea, and cough.   Was found to have multiple pulmonary nodules with GGO on chest CT. Blood cx on 4/29/2019 and BAL on 4/30/2019 grew P aeruginosa.   Treated with cefepime IV for 4 weeks.   Due to multiple risk factors for aspergillosis including neutropenia and positive BD glucan, he was also empirically treated with micafunin IV starting 5/6/2019.   We could not use azoles and  fluoroquinolones due to prolonged QTc at baseline. Costs prohibited the use of isavuconazole.      Neutropenia resolved after the 2 doses of G-CSF on 5/1/2019 and 5/2/2019. Also after the discontinuation of VGCV and bactrim and decreasing the dose of MMF.       Due to neutropenia and inability to use VGCV, ID recommended weekly CMV PCR in the setting of increasing MMF.   In July of 2019 his CMV VL was 3.5 log, it was repeated in 9/2019 and was 3.9 log. He was started on VGCV adjusted to kidney function on 9/20/2019 till 10/17/2019 after 2 consecutive CMV PCR were <2.1 log. He was also complaining of diarrhea and abdominal pain. I thought the abdominal pain was due to CMV. He was evaluated the same day by GI due to hx of UC. His C diff test was positive and was treated with 14 days of vancomycin PO.           Immunizations:     Immunization History   Administered Date(s) Administered     Influenza (IIV3) PF 10/22/2008, 09/25/2009, 11/07/2011, 11/30/2012, 10/27/2013, 10/02/2014     Influenza Vaccine IM > 6 months Valent IIV4 10/02/2015     Influenza Vaccine, 3 YRS +, IM (QUADRIVALENT W/PRESERVATIVES) 09/12/2016, 10/16/2017, 09/20/2018     OPV, trivalent, live 10/20/1978     Pneumo Conj 13-V (2010&after) 09/26/2018     Pneumococcal 23 valent 09/28/2019     TDAP Vaccine (Boostrix) 07/20/2012     Td (Adult), Adsorbed 10/20/1978             Allergies:     Allergies   Allergen Reactions     Hydromorphone Other (See Comments)     Significant Delirium     Adhesive Tape Blisters     Tegaderm causes bruises, blisters and extreme irritation.     Lisinopril Other (See Comments)     hypotension     Quinolones Other (See Comments)     Would not rx quinolones until QTc interval improved.      Tobramycin Other (See Comments)     Would not use aminoglycosides as his kidney function is very borderline     Codeine Nausea             Medications:     Current Outpatient Medications   Medication Sig     acetaminophen (TYLENOL) 325 MG  tablet Take 2 tablets (650 mg) by mouth every 4 hours as needed for mild pain     albuterol (PROAIR HFA/PROVENTIL HFA/VENTOLIN HFA) 108 (90 Base) MCG/ACT inhaler Inhale 2 puffs into the lungs every 6 hours as needed for shortness of breath / dyspnea or wheezing     alendronate (FOSAMAX) 70 MG tablet Take 1 tablet (70 mg) by mouth every 7 days Take 60 minutes before am meal with 8 oz. water. Remain upright for 30 minutes.     amLODIPine (NORVASC) 5 MG tablet TAKE 1 TABLET BY MOUTH EVERY DAY     aspirin (ASA) 81 MG chewable tablet Take 1 tablet (81 mg) by mouth daily     calcium carbonate 600 mg-vitamin D 400 units (CALTRATE) 600-400 MG-UNIT per tablet Take 1 tablet by mouth 2 times daily (with meals)     cetirizine (ZYRTEC) 10 MG tablet Take 10 mg by mouth daily     DULoxetine (CYMBALTA) 20 MG EC capsule Take 20 mg by mouth daily     fluticasone (FLOVENT HFA) 220 MCG/ACT Inhaler Inhale 2 puffs into the lungs 2 times daily     levothyroxine (SYNTHROID/LEVOTHROID) 100 MCG tablet MON to SAT 1 tablet/day;SUN 0.5 tablet     magnesium oxide (MAG-OX) 400 MG tablet Take 1 tablet (400 mg) by mouth 2 times daily     melatonin 3 MG tablet Take 2 tablets (6 mg) by mouth At Bedtime (Patient taking differently: Take 5 mg by mouth At Bedtime )     mesalamine (ASACOL HD) 800 MG EC tablet Take 1 tablet (800 mg) by mouth 3 times daily     multivitamin w/minerals (THERA-VIT-M) tablet Take 1 tablet by mouth daily     mycophenolate (GENERIC EQUIVALENT) 250 MG capsule Take 6 capsules (1,500 mg) by mouth 2 times daily     omeprazole (PRILOSEC) 40 MG DR capsule Take 1 capsule (40 mg) by mouth daily     ondansetron (ZOFRAN-ODT) 4 MG ODT tab DISSOLVE 1 TABLET ON THE TONGUE EVERY EIGHT HOURS AS NEEDED     polyethylene glycol (MIRALAX/GLYCOLAX) powder Take 1 capful by mouth daily     rosuvastatin (CRESTOR) 10 MG tablet TAKE 1 TABLET BY MOUTH EVERY DAY     senna (SENOKOT) 8.6 MG tablet Take 2 tablets by mouth 2 times daily as needed for  "constipation     tacrolimus (GENERIC EQUIVALENT) 1 MG capsule Take 3 capsules (3 mg) by mouth 2 times daily     valGANciclovir (VALCYTE) 450 MG tablet Take 1 tablet (450 mg) by mouth daily     No current facility-administered medications for this visit.             Review of Systems:   As mentioned in the interim history otherwise negative by reviewing constitutional symptoms, central and peripheral neurological systems, respiratory system, cardiac system, GI system,  system, musculoskeletal, skin, allergy, and lymphatics.                  Physical Exam:   /79   Pulse 109   Temp 98.4  F (36.9  C) (Oral)   Ht 1.689 m (5' 6.5\")   Wt 68.2 kg (150 lb 4.8 oz)   SpO2 100%   BMI 23.90 kg/m       Constitutional: awake, alert, cooperative, no apparent distress and appears at stated age, well nourished.            Laboratory Data:     No results found for: ACD4    Inflammatory Markers    Recent Labs   Lab Test 09/24/19  0435 09/23/19  1534 09/17/19  1702 05/15/19  0940 05/02/19  0536 04/29/19  0911 11/15/18  0955 10/22/18  1239   SED  --  17 18  --  37*  --   --   --    CRP <2.9 3.8 <2.9 <2.9 69.0* 120.0* 6.1 12.5       Immune Globulin Studies      Recent Labs   Lab Test 09/17/19  1702 05/01/19  1850   IGG  --  358*   IGM  --  29*   IGE  --  38    106       Metabolic Studies    Recent Labs   Lab Test 12/03/19  1851 11/22/19  0933 11/14/19  0929 11/07/19  0928 10/31/19  0936 10/24/19  0855 10/15/19  0835  09/23/19  1534  07/17/19  1030  06/17/19  0926  05/22/19  0926  04/29/19  0918  02/23/19  1835    141 140 140 139 139 139   < > 134   < > 135   < > 137   < > 138   < >  --    < > 133   POTASSIUM 4.5 4.4 4.6 4.5 4.5 4.5 4.6   < > 4.6   < > 4.2   < > 4.1   < > 3.7   < >  --    < > 3.6   CHLORIDE 108 111* 111* 110* 110* 108 107   < > 108   < > 103   < > 104   < > 102   < >  --    < > 93*   CO2 23 19* 21 20 20 23 24   < > 15*   < > 24   < > 20   < > 25   < >  --    < > 30   ANIONGAP 6 11 8 10 9 8 8   < " > 11   < > 8   < > 13   < > 10   < >  --    < > 10   BUN 31* 29 30 29 26 39* 31*   < > 47*   < > 17   < > 27   < > 53*   < >  --    < > 28   CR 1.33* 1.21 1.12 1.16 1.13 1.19 1.20   < > 2.41*   < > 1.07   < > 1.39*   < > 2.43*   < >  --    < > 1.03   GFRESTIMATED 59* 66 73 70 72 68 67   < > 29*   < > 77   < > 56*   < > 29*   < >  --    < > 81   GLC 95 120* 80 117* 103* 86 82   < > 96   < > 80   < > 80   < > 82   < >  --    < > 114*   A1C  --   --   --   --   --   --   --   --   --   --   --   --   --   --   --   --   --   --  5.7*   RADAMES 8.5 8.8 8.7 9.0 8.5 9.1 9.1   < > 9.0   < > 8.9   < > 8.8   < > 9.3   < >  --    < > 8.9   PHOS  --   --   --   --   --   --  4.2  --  4.8*  --  4.0  --  3.8   < > 4.2   < >  --    < > 3.6   MAG  --   --   --   --   --   --  1.8  --  2.0  --  1.7  --  1.4*   < > 1.9   < >  --    < > 2.0   LACT  --   --   --   --   --   --   --   --  1.2  --   --   --   --   --   --   --  1.7   < >  --    CKT  --   --   --   --   --   --  56  --   --   --  34  --  46  --  26*  --   --   --   --     < > = values in this interval not displayed.       Hepatic Studies    Recent Labs   Lab Test 12/03/19  1851 11/22/19  0933 11/14/19  0929 11/07/19  0928 10/31/19  0936 10/24/19  0855  03/14/19  1653  03/10/19  0410   BILITOTAL 0.4 0.4 0.5 0.5 0.4 0.4   < >  --    < > 0.8   ALKPHOS 187* 185* 195* 206* 217* 233*   < >  --    < > 104   PROTTOTAL 7.0 6.8 6.6* 6.9 6.8 7.0   < >  --    < > 5.0*   ALBUMIN 3.8 3.7 3.6 3.8 3.9 3.9   < >  --    < > 2.8*   AST 21 21 23 17 25 26   < >  --    < > 22   ALT 25 26 24 26 31 27   < >  --    < > 19   LDH  --   --   --   --   --   --   --  320*  --  276*    < > = values in this interval not displayed.       Hematology Studies     Recent Labs   Lab Test 12/03/19  1851 11/22/19  0933 11/14/19  0929 11/07/19  0928 10/31/19  0936 10/24/19  0855  09/28/19  0537 09/27/19  0446  09/24/19  0437  09/23/19  1534 09/17/19  1702 09/17/19  0856   WBC 3.3* 3.6* 4.0 4.3 4.5 3.7*   < > 3.5*  4.0   < > 3.6*  --  4.6 3.7* 3.5*   ANEU  --   --   --   --   --   --   --  2.7 2.9  --  1.9  --  2.9 2.0 1.9   ALYM  --   --   --   --   --   --   --  0.5* 0.9  --  0.8  --  1.0 1.1 0.9   GRACE  --   --   --   --   --   --   --  0.1 0.1  --  0.4  --  0.4 0.4 0.5   AEOS  --   --   --   --   --   --   --  0.2 0.1  --  0.3  --  0.3 0.3 0.3   HGB 9.8* 9.1* 8.7* 8.9* 9.0* 9.0*   < > 8.0* 7.7*   < > 7.6*  --  10.2* 10.1* 9.5*   HCT 30.9* 29.0* 27.9* 28.6* 28.4* 28.7*   < > 24.8* 24.2*   < > 24.2*  --  32.7* 32.8* 28.5*    241 232 244 319 321   < > 247 231   < > 234   < > 290 284 279    < > = values in this interval not displayed.       Clotting Studies    Recent Labs   Lab Test 09/23/19  1534 05/08/19  0504 05/07/19  0451 05/06/19  0438  03/14/19  1653  02/27/19  0421 02/25/19  1502   INR 1.17* 1.22* 1.33* 1.31*   < > 1.36*   < >  --  1.29*   PTT 35  --   --   --   --  46*  --  38* 32    < > = values in this interval not displayed.       Urine Studies    Recent Labs   Lab Test 05/26/19  1006 04/29/19  1031 03/26/19  1707 03/17/19  0045 03/04/19  1303   URINEPH 6.5 6.0 5.5 5.5 5.0   NITRITE Negative Negative Negative Negative Negative   LEUKEST Negative Negative Negative Negative Negative   WBCU 0 <1 2 15* 0         Microbiology:  Last 6 Culture results with specimen source  Culture Micro   Date Value Ref Range Status   09/23/2019 No growth  Final   05/02/2019 No growth  Final   05/02/2019 No growth  Final   05/02/2019 (A)  Final    Canceled, Test credited  >10 Squamous epithelial cells/low power field indicates oral contamination. Please   recollect.     05/01/2019 No growth  Final   05/01/2019 No growth  Final    Specimen Description   Date Value Ref Range Status   09/26/2019 Feces  Final   09/23/2019 Blood Left Arm  Final   09/20/2019 Feces  Final   09/20/2019 Feces  Final   09/20/2019 Feces  Final   05/02/2019 Blood Right Hand  Final   05/02/2019 Blood Right Hand  Final        Last check of C difficile  C Diff  Toxin B PCR   Date Value Ref Range Status   09/20/2019 Positive (A) NEG^Negative Final     Comment:     Positive: Toxin producing C. difficile target DNA sequences detected, presumed   positive for C. difficile toxin B. C. difficile (Requires Enteric Isolation).      Clostridium difficile (Requires Enteric Isolation)  FDA approved assay performed using [employer] GeneXpert real-time PCR.           Virology:  CMV viral loads    CMV viral loads  No results found for: 04763, 78271, 25842, 42317  CMV viral loads    Recent Labs   Lab Test 12/03/19  1851 11/22/19  0935   CSPEC Plasma EDTA PLASMA   CMVLOG 2.5* 2.5*       CMV viral loads    Log IU/mL of CMVQNT   Date Value Ref Range Status   12/03/2019 2.5 (H) <2.1 [Log_IU]/mL Final   11/22/2019 2.5 (H) <2.1 [Log_IU]/mL Final   11/14/2019 <2.1 <2.1 [Log_IU]/mL Final   11/07/2019 2.2 (H) <2.1 [Log_IU]/mL Final   10/31/2019 <2.1 <2.1 [Log_IU]/mL Final   10/24/2019 <2.1 <2.1 [Log_IU]/mL Final   10/15/2019 <2.1 <2.1 [Log_IU]/mL Final   10/03/2019 2.6 (H) <2.1 [Log_IU]/mL Final   09/26/2019 3.0 (H) <2.1 [Log_IU]/mL Final   09/17/2019 3.9 (H) <2.1 [Log_IU]/mL Final   07/24/2019 3.5 (H) <2.1 [Log_IU]/mL Final   06/26/2019 <2.1 <2.1 [Log_IU]/mL Final   06/04/2019 Not Calculated <2.1 [Log_IU]/mL Final   05/24/2019 Not Calculated <2.1 [Log_IU]/mL Final   05/22/2019 Not Calculated <2.1 [Log_IU]/mL Final   05/15/2019 Not Calculated <2.1 [Log_IU]/mL Final   05/06/2019 Not Calculated <2.1 [Log_IU]/mL Final   04/30/2019 Not Calculated <2.1 [Log_IU]/mL Final   04/29/2019 Not Calculated <2.1 [Log_IU]/mL Final   04/26/2019 Not Calculated <2.1 [Log_IU]/mL Final   04/24/2019 Not Calculated <2.1 [Log_IU]/mL Final   04/17/2019 Not Calculated <2.1 [Log_IU]/mL Final   04/08/2019 Not Calculated <2.1 [Log_IU]/mL Final   04/01/2019 Not Calculated <2.1 [Log_IU]/mL Final   03/25/2019 Not Calculated <2.1 [Log_IU]/mL Final   03/18/2019 Not Calculated <2.1 [Log_IU]/mL Final       CMV resistance testing  No  lab results found.  No results found for: CMVCID, CMVFOS, CMVGAN   EBV viral loads     Recent Labs   Lab Test 10/15/19  0835 09/17/19  1702 05/22/19  0926 04/29/19  0911 04/26/19  0848 03/27/19  0521   EBRES EBV DNA Not Detected EBV DNA Not Detected 541* EBV DNA Not Detected EBV DNA Not Detected EBV DNA Not Detected     EBV DNA Copies/mL   Date Value Ref Range Status   10/15/2019 EBV DNA Not Detected EBVNEG^EBV DNA Not Detected [Copies]/mL Final   09/17/2019 EBV DNA Not Detected EBVNEG^EBV DNA Not Detected [Copies]/mL Final   05/22/2019 541 (A) EBVNEG^EBV DNA Not Detected [Copies]/mL Final   04/29/2019 EBV DNA Not Detected EBVNEG^EBV DNA Not Detected [Copies]/mL Final   04/26/2019 EBV DNA Not Detected EBVNEG^EBV DNA Not Detected [Copies]/mL Final   03/27/2019 EBV DNA Not Detected EBVNEG^EBV DNA Not Detected [Copies]/mL Final       Human Herpes Virus 6 viral loads    No results found for: H6RES No results found for: H6SPEC    CMV Antibody IgG   Date Value Ref Range Status   02/23/2019 >8.0 (H) 0.0 - 0.8 AI Final     Comment:     Positive  Antibody index (AI) values reflect qualitative changes in antibody   concentration that cannot be directly associated with clinical condition or   disease state.     02/15/2019 >8.0 (H) 0.0 - 0.8 AI Final     Comment:     Positive  Antibody index (AI) values reflect qualitative changes in antibody   concentration that cannot be directly associated with clinical condition or   disease state.       Toxoplasma Antibody IgG   Date Value Ref Range Status   02/22/2019 <3.0 0.0 - 7.1 IU/mL Final     Comment:     Negative- Absence of detectable Toxoplasma gondii IgG antibodies. A negative   result does not rule out acute infection.  The magnitude of the measured result is not indicative of the amount of   antibody present. The concentrations of anti-Toxoplasma gondii IgG in a given   specimen determined with assays from different manufacturers can vary due to   differences in assay methods  and reagent specificity.         Imaging:  Results for orders placed or performed during the hospital encounter of 09/23/19   CT Chest Abdomen Pelvis w/o Contrast    Narrative    EXAMINATION: CT CHEST ABDOMEN PELVIS W/O CONTRAST  9/23/2019 6:27 PM      CLINICAL HISTORY: increase ab pain with n/v/d hx of c diff and cmv  infection. patient with FRANKLIN also-- no contrast.    COMPARISON: Chest CT on 5/22/2019, CT cap on 4/29/2019        PROCEDURE COMMENTS: CT of the chest, abdomen, and pelvis was performed  without intravenous and oral contrast. Axial MIP  images of the chest,  and coronal and sagittal reformatted images of the chest, abdomen, and  pelvis obtained.    FINDINGS:      Chest:  The trachea and central airways are clear.  There is no mass or  infiltration. Residual left upper lobe 4 mm nodularity (image 52  series 4), previously measured 10 x 8 mm. No significant pulmonary  nodules. No pleural effusion or pneumothorax.    Unchanged asymmetric enlarged left thyroid lobe. Postoperative changes  of heart transplant. Stable positioning of left brachiocephalic vein  stent. There is no pericardial effusion.  Normal thoracic vasculature.  No significant mediastinal, hilum or axillary lymphadenopathy.     Abdomen/pelvis:   Limited evaluation due to lack of IV contrast.    Liver: No focal mass. Unchanged multiple small hypoattenuating  lesions, too small to characterize.    Gallbladder: No radiopaque gallstones. No pericystic fluid.    Pancreas: No pancreatic mass or pancreatic duct dilatation.    Spleen: Not enlarged.    Adrenal glands: Within normal limits.    Urinary tract: Kidneys are normal in appearance. There is no evidence  for hydronephrosis or hydroureter. Urinary bladder is unremarkable.    Reproductive organs: Within normal limits.    GI system: No abnormally dilated small bowel or colon. No definite  bowel wall thickening.    Peritoneum/fluid: No ascites     Vessels: No aneurysmal dilatation of the  abdominal aorta.  The portal,  splenic, and superior mesenteric veins are patent.  The origins of the  celiac and superior mesenteric arteries are patent.    Lymph nodes: No enlarged lymphadenopathy. Numerous subcentimeter  mesenteric, retroperitoneal lymph nodes.    Bones and the soft tissues: No aggressive osseous lesions. Stable T12  compression deformity with approximately 30% vertebral body height  loss..      Impression    IMPRESSION:  1.  Continued decrease in size of left upper lung lesion with residual  nodularity, previously measured 10 x 8 mm, suggestive of  infectious/inflammatory process. No new pulmonary lesions.  2.  Limited evaluation of abdomen and pelvis due to lack of IV  contrast. Nonspecific numerous subcentimeter mesenteric and  retroperitoneal lymph nodes, likely reactive.  3.  Resolution of ascites.  4.  Blood pool density lower than myocardium, compatible with  patient's known anemia.    I have personally reviewed the examination and initial interpretation  and I agree with the findings.    LAUREN RICHARD MD   US Breast Left Limited 1-3 Quadrants    Narrative    Examination: LEFT BREAST ULTRASOUND, 9/24/2019 8:52 AM     Comparison: CT chest abdomen and pelvis 9/23/2019.    History/Family History: Left breast lump located circumferentially  around the nipple noted by patient approximately one month ago.  Swelling under left breast nipple. Evaluate for left breast abscess.    Findings: In the retroareolar left breast there is moderate  gynecomastia, as seen on CT one day prior. No fluid collection  concerning for abscess.      Impression    IMPRESSION: BI-RADS CATEGORY: 2 - Benign. Gynecomastia.    RECOMMENDED FOLLOW-UP: Clinical follow-up.      I have personally reviewed the examination and initial interpretation  and I agree with the findings.    MADISON PEREZ MD   XR Abdomen Port 1 View    Narrative    XR ABDOMEN PORT 1 VW  9/24/2019 5:39 PM    History:  c diff.     Comparison: CT  cap on 9/23/2019    Findings:   Portable supine AP abdominal radiograph. Nonobstructive bowel gas  pattern. No pneumatosis, portal venous gas. No acute osseous  abnormality. The visualized lung fields are clear.      Impression    IMPRESSION:  None obstructive bowel gas pattern without pneumatosis or portal  venous gas.    I have personally reviewed the examination and initial interpretation  and I agree with the findings.    MADISON PEREZ MD   XR Abdomen Port 1 View    Narrative    XR ABDOMEN PORT 1 VW  9/26/2019 5:05 PM    History:  C diff.     Comparison: Abdominal radiograph on 9/24/2019    Findings:   Supine portable AP abdominal radiograph. None obstructive bowel gas  pattern without pneumatosis or portal venous gas. No acute osseous  abnormality.      Impression    IMPRESSION:  Nonobstructive bowel gas pattern without pneumatosis or portal venous  gas.    I have personally reviewed the examination and initial interpretation  and I agree with the findings.    MD Ric VEGA MD

## 2019-12-03 NOTE — LETTER
12/3/2019       RE: Everton Larios  [address] [facility] [address]     Dear Colleague,    Thank you for referring your patient, Everton Larios, to the [facility] AND INFECTIOUS DISEASES at [facility]. Please see a copy of my visit note below.    [facility]    Transplant Infectious Diseases Outpatient Progress note      Patient:  Everton Larios, Date of birth 1965, Medical record number [id number]  Date of Visit:  12/04/2019            Recommendations:   1. Will repeat CMV in one month or if he's symptomatic.   2. Good candidate for shingrix and pneumovax vaccines but he's waiting for next year for insurance issues.      RTC: as needed.         Summary of Presentation:   Transplants:  2/24/2019 (Heart), Postoperative day:  283     This patient is a 54 year old male with UC and congenital heart disease s/p OHT in 2/2019 with methylprednisolone induction and TAC/MMF for maintenance.   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         Active Problems and Infectious Diseases Issues:   1. CMV viremia.   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   He was treated with GCV with neutropenia adverse event.   Eventually we stopped VGCV around 10/17/2019 when VL was <2.1 log. No maintenance doses due to financial strain.     Starting 11/22/2019 he's been experiencing low grade viremia of 2.5 log. He is asymptomatic with no evidence of end-organ involvement including the GI tract.     Will check in one month or earlier if symptomatic.     2. High alk phos  Scheduled for MRCP and colonoscopy by GI for history of IBD.         Old Problems and Infectious Diseases Issues:   1. Drug induced neutropenia resolved after  stopping bactrim and VGCV and with couple of doses of G-CSF.  2. P aeruginosa BSI 4/29/2019 with P aeruginosa HCAP and pulmonary nodules in the setting of febrile neutropenia. We treated him for severe pseudomonal pneumonia with 4 week course of ABx till 5/28/2019. He was also treated with IV micafungin till 5/28/2019 due to pulmonary nodules and high risk for aspergillus given positive BD glucan. CT scan continues to improve while off of antimicrobilals as evidenced on a CT done this admission on 9/24/2019 while off of ABx and antifungals.    3. QTc prolongation. Prevented the use of azole and fluoroquinolones.   4. CDI 9/2019 treated with 2 weeks of PO vancomycin.       Other Infectious Disease issues include:  - QTc 532 9/23/2019.   - PCP prophylaxis: off of bactrim since 4/26/2019. Was given pentamidine till 8/2019.   - Serostatus: CMV D+/R+, EBV D+/R+, HSV1+/2-, VZV +                - Immunization status: good candidate for shingrix and pneumovax but he's waiting for it to be covered by his insurance.    - Gamma globulin status: 358 5/1/2019.       Attestation:  I interviewed the patient and obtained history from the patient, and by reviewing the patient's chart including outside records, microbiological data, and radiological data. All data are summarized in this notes.  Ric Shukla MD   Pager: [phone]  12/04/2019         Interim History:   No diarrhea no N/V.   He c/o flatulence and burping but no other complaints.         HPI:       Transplants:  2/24/2019 (Heart); Postoperative day:  283    This patient is a 54 year old male with congenital heart disease s/p OHT in 2/2019 with methylprednisolone induction and TAC/MMF/prednisone for maintenance.      The course was complicated by neutropenia late 4/2019. He was then admitted with fever, neutropenia dyspnea, and cough.   Was found to have multiple pulmonary nodules with GGO on chest CT. Blood cx on 4/29/2019 and BAL on 4/30/2019 grew P aeruginosa.    Treated with cefepime IV for 4 weeks.   Due to multiple risk factors for aspergillosis including neutropenia and positive BD glucan, he was also empirically treated with micafunin IV starting 5/6/2019.   We could not use azoles and fluoroquinolones due to prolonged QTc at baseline. Costs prohibited the use of isavuconazole.      Neutropenia resolved after the 2 doses of G-CSF on 5/1/2019 and 5/2/2019. Also after the discontinuation of VGCV and bactrim and decreasing the dose of MMF.       Due to neutropenia and inability to use VGCV, ID recommended weekly CMV PCR in the setting of increasing MMF.   In July of 2019 his CMV VL was 3.5 log, it was repeated in 9/2019 and was 3.9 log. He was started on VGCV adjusted to kidney function on 9/20/2019 till 10/17/2019 after 2 consecutive CMV PCR were <2.1 log. He was also complaining of diarrhea and abdominal pain. I thought the abdominal pain was due to CMV. He was evaluated the same day by GI due to hx of UC. His C diff test was positive and was treated with 14 days of vancomycin PO.             Immunizations:     Immunization History   Administered Date(s) Administered     Influenza (IIV3) PF 10/22/2008, 09/25/2009, 11/07/2011, 11/30/2012, 10/27/2013, 10/02/2014     Influenza Vaccine IM > 6 months Valent IIV4 10/02/2015     Influenza Vaccine, 3 YRS +, IM (QUADRIVALENT W/PRESERVATIVES) 09/12/2016, 10/16/2017, 09/20/2018     OPV, trivalent, live 10/20/1978     Pneumo Conj 13-V (2010&after) 09/26/2018     Pneumococcal 23 valent 09/28/2019     TDAP Vaccine (Boostrix) 07/20/2012     Td (Adult), Adsorbed 10/20/1978             Allergies:     Allergies   Allergen Reactions     Hydromorphone Other (See Comments)     Significant Delirium     Adhesive Tape Blisters     Tegaderm causes bruises, blisters and extreme irritation.     Lisinopril Other (See Comments)     hypotension     Quinolones Other (See Comments)     Would not rx quinolones until QTc interval improved.       Tobramycin Other (See Comments)     Would not use aminoglycosides as his kidney function is very borderline     Codeine Nausea             Medications:     Current Outpatient Medications   Medication Sig     acetaminophen (TYLENOL) 325 MG tablet Take 2 tablets (650 mg) by mouth every 4 hours as needed for mild pain     albuterol (PROAIR HFA/PROVENTIL HFA/VENTOLIN HFA) 108 (90 Base) MCG/ACT inhaler Inhale 2 puffs into the lungs every 6 hours as needed for shortness of breath / dyspnea or wheezing     alendronate (FOSAMAX) 70 MG tablet Take 1 tablet (70 mg) by mouth every 7 days Take 60 minutes before am meal with 8 oz. water. Remain upright for 30 minutes.     amLODIPine (NORVASC) 5 MG tablet TAKE 1 TABLET BY MOUTH EVERY DAY     aspirin (ASA) 81 MG chewable tablet Take 1 tablet (81 mg) by mouth daily     calcium carbonate 600 mg-vitamin D 400 units (CALTRATE) 600-400 MG-UNIT per tablet Take 1 tablet by mouth 2 times daily (with meals)     cetirizine (ZYRTEC) 10 MG tablet Take 10 mg by mouth daily     DULoxetine (CYMBALTA) 20 MG EC capsule Take 20 mg by mouth daily     fluticasone (FLOVENT HFA) 220 MCG/ACT Inhaler Inhale 2 puffs into the lungs 2 times daily     levothyroxine (SYNTHROID/LEVOTHROID) 100 MCG tablet MON to SAT 1 tablet/day;SUN 0.5 tablet     magnesium oxide (MAG-OX) 400 MG tablet Take 1 tablet (400 mg) by mouth 2 times daily     melatonin 3 MG tablet Take 2 tablets (6 mg) by mouth At Bedtime (Patient taking differently: Take 5 mg by mouth At Bedtime )     mesalamine (ASACOL HD) 800 MG EC tablet Take 1 tablet (800 mg) by mouth 3 times daily     multivitamin w/minerals (THERA-VIT-M) tablet Take 1 tablet by mouth daily     mycophenolate (GENERIC EQUIVALENT) 250 MG capsule Take 6 capsules (1,500 mg) by mouth 2 times daily     omeprazole (PRILOSEC) 40 MG DR capsule Take 1 capsule (40 mg) by mouth daily     ondansetron (ZOFRAN-ODT) 4 MG ODT tab DISSOLVE 1 TABLET ON THE TONGUE EVERY EIGHT HOURS AS NEEDED      "polyethylene glycol (MIRALAX/GLYCOLAX) powder Take 1 capful by mouth daily     rosuvastatin (CRESTOR) 10 MG tablet TAKE 1 TABLET BY MOUTH EVERY DAY     senna (SENOKOT) 8.6 MG tablet Take 2 tablets by mouth 2 times daily as needed for constipation     tacrolimus (GENERIC EQUIVALENT) 1 MG capsule Take 3 capsules (3 mg) by mouth 2 times daily     valGANciclovir (VALCYTE) 450 MG tablet Take 1 tablet (450 mg) by mouth daily     No current facility-administered medications for this visit.             Review of Systems:   As mentioned in the interim history otherwise negative by reviewing constitutional symptoms, central and peripheral neurological systems, respiratory system, cardiac system, GI system,  system, musculoskeletal, skin, allergy, and lymphatics.                  Physical Exam:   /79   Pulse 109   Temp 98.4  F (36.9  C) (Oral)   Ht 1.689 m (5' 6.5\")   Wt 68.2 kg (150 lb 4.8 oz)   SpO2 100%   BMI 23.90 kg/m       Constitutional: awake, alert, cooperative, no apparent distress and appears at stated age, well nourished.            Laboratory Data:     No results found for: ACD4    Inflammatory Markers    Recent Labs   Lab Test 09/24/19  0435 09/23/19  1534 09/17/19  1702 05/15/19  0940 05/02/19  0536 04/29/19  0911 11/15/18  0955 10/22/18  1239   SED  --  17 18  --  37*  --   --   --    CRP <2.9 3.8 <2.9 <2.9 69.0* 120.0* 6.1 12.5       Immune Globulin Studies      Recent Labs   Lab Test 09/17/19  1702 05/01/19  1850   IGG  --  358*   IGM  --  29*   IGE  --  38    106       Metabolic Studies    Recent Labs   Lab Test 12/03/19  1851 11/22/19  0933 11/14/19  0929 11/07/19  0928 10/31/19  0936 10/24/19  0855 10/15/19  0835  09/23/19  1534  07/17/19  1030  06/17/19  0926  05/22/19  0926  04/29/19  0918  02/23/19  1835    141 140 140 139 139 139   < > 134   < > 135   < > 137   < > 138   < >  --    < > 133   POTASSIUM 4.5 4.4 4.6 4.5 4.5 4.5 4.6   < > 4.6   < > 4.2   < > 4.1   < > 3.7   < >  " --    < > 3.6   CHLORIDE 108 111* 111* 110* 110* 108 107   < > 108   < > 103   < > 104   < > 102   < >  --    < > 93*   CO2 23 19* 21 20 20 23 24   < > 15*   < > 24   < > 20   < > 25   < >  --    < > 30   ANIONGAP 6 11 8 10 9 8 8   < > 11   < > 8   < > 13   < > 10   < >  --    < > 10   BUN 31* 29 30 29 26 39* 31*   < > 47*   < > 17   < > 27   < > 53*   < >  --    < > 28   CR 1.33* 1.21 1.12 1.16 1.13 1.19 1.20   < > 2.41*   < > 1.07   < > 1.39*   < > 2.43*   < >  --    < > 1.03   GFRESTIMATED 59* 66 73 70 72 68 67   < > 29*   < > 77   < > 56*   < > 29*   < >  --    < > 81   GLC 95 120* 80 117* 103* 86 82   < > 96   < > 80   < > 80   < > 82   < >  --    < > 114*   A1C  --   --   --   --   --   --   --   --   --   --   --   --   --   --   --   --   --   --  5.7*   RADAMES 8.5 8.8 8.7 9.0 8.5 9.1 9.1   < > 9.0   < > 8.9   < > 8.8   < > 9.3   < >  --    < > 8.9   PHOS  --   --   --   --   --   --  4.2  --  4.8*  --  4.0  --  3.8   < > 4.2   < >  --    < > 3.6   MAG  --   --   --   --   --   --  1.8  --  2.0  --  1.7  --  1.4*   < > 1.9   < >  --    < > 2.0   LACT  --   --   --   --   --   --   --   --  1.2  --   --   --   --   --   --   --  1.7   < >  --    CKT  --   --   --   --   --   --  56  --   --   --  34  --  46  --  26*  --   --   --   --     < > = values in this interval not displayed.       Hepatic Studies    Recent Labs   Lab Test 12/03/19  1851 11/22/19  0933 11/14/19  0929 11/07/19  0928 10/31/19  0936 10/24/19  0855  03/14/19  1653  03/10/19  0410   BILITOTAL 0.4 0.4 0.5 0.5 0.4 0.4   < >  --    < > 0.8   ALKPHOS 187* 185* 195* 206* 217* 233*   < >  --    < > 104   PROTTOTAL 7.0 6.8 6.6* 6.9 6.8 7.0   < >  --    < > 5.0*   ALBUMIN 3.8 3.7 3.6 3.8 3.9 3.9   < >  --    < > 2.8*   AST 21 21 23 17 25 26   < >  --    < > 22   ALT 25 26 24 26 31 27   < >  --    < > 19   LDH  --   --   --   --   --   --   --  320*  --  276*    < > = values in this interval not displayed.       Hematology Studies     Recent Labs   Lab  Test 12/03/19  1851 11/22/19  0933 11/14/19  0929 11/07/19  0928 10/31/19  0936 10/24/19  0855  09/28/19  0537 09/27/19  0446  09/24/19  0437  09/23/19  1534 09/17/19  1702 09/17/19  0856   WBC 3.3* 3.6* 4.0 4.3 4.5 3.7*   < > 3.5* 4.0   < > 3.6*  --  4.6 3.7* 3.5*   ANEU  --   --   --   --   --   --   --  2.7 2.9  --  1.9  --  2.9 2.0 1.9   ALYM  --   --   --   --   --   --   --  0.5* 0.9  --  0.8  --  1.0 1.1 0.9   GRACE  --   --   --   --   --   --   --  0.1 0.1  --  0.4  --  0.4 0.4 0.5   AEOS  --   --   --   --   --   --   --  0.2 0.1  --  0.3  --  0.3 0.3 0.3   HGB 9.8* 9.1* 8.7* 8.9* 9.0* 9.0*   < > 8.0* 7.7*   < > 7.6*  --  10.2* 10.1* 9.5*   HCT 30.9* 29.0* 27.9* 28.6* 28.4* 28.7*   < > 24.8* 24.2*   < > 24.2*  --  32.7* 32.8* 28.5*    241 232 244 319 321   < > 247 231   < > 234   < > 290 284 279    < > = values in this interval not displayed.       Clotting Studies    Recent Labs   Lab Test 09/23/19  1534 05/08/19  0504 05/07/19  0451 05/06/19  0438  03/14/19  1653  02/27/19  0421 02/25/19  1502   INR 1.17* 1.22* 1.33* 1.31*   < > 1.36*   < >  --  1.29*   PTT 35  --   --   --   --  46*  --  38* 32    < > = values in this interval not displayed.       Urine Studies    Recent Labs   Lab Test 05/26/19  1006 04/29/19  1031 03/26/19  1707 03/17/19  0045 03/04/19  1303   URINEPH 6.5 6.0 5.5 5.5 5.0   NITRITE Negative Negative Negative Negative Negative   LEUKEST Negative Negative Negative Negative Negative   WBCU 0 <1 2 15* 0         Microbiology:  Last 6 Culture results with specimen source  Culture Micro   Date Value Ref Range Status   09/23/2019 No growth  Final   05/02/2019 No growth  Final   05/02/2019 No growth  Final   05/02/2019 (A)  Final    Canceled, Test credited  >10 Squamous epithelial cells/low power field indicates oral contamination. Please   recollect.     05/01/2019 No growth  Final   05/01/2019 No growth  Final    Specimen Description   Date Value Ref Range Status   09/26/2019 Feces   Final   09/23/2019 Blood Left Arm  Final   09/20/2019 Feces  Final   09/20/2019 Feces  Final   09/20/2019 Feces  Final   05/02/2019 Blood Right Hand  Final   05/02/2019 Blood Right Hand  Final        Last check of C difficile  C Diff Toxin B PCR   Date Value Ref Range Status   09/20/2019 Positive (A) NEG^Negative Final     Comment:     Positive: Toxin producing C. difficile target DNA sequences detected, presumed   positive for C. difficile toxin B. C. difficile (Requires Enteric Isolation).      Clostridium difficile (Requires Enteric Isolation)  FDA approved assay performed using [employer] GeneXpert real-time PCR.           Virology:  CMV viral loads    CMV viral loads  No results found for: 90867, 63562, 29699, 05445  CMV viral loads    Recent Labs   Lab Test 12/03/19  1851 11/22/19  0935   CSPEC Plasma EDTA PLASMA   CMVLOG 2.5* 2.5*       CMV viral loads    Log IU/mL of CMVQNT   Date Value Ref Range Status   12/03/2019 2.5 (H) <2.1 [Log_IU]/mL Final   11/22/2019 2.5 (H) <2.1 [Log_IU]/mL Final   11/14/2019 <2.1 <2.1 [Log_IU]/mL Final   11/07/2019 2.2 (H) <2.1 [Log_IU]/mL Final   10/31/2019 <2.1 <2.1 [Log_IU]/mL Final   10/24/2019 <2.1 <2.1 [Log_IU]/mL Final   10/15/2019 <2.1 <2.1 [Log_IU]/mL Final   10/03/2019 2.6 (H) <2.1 [Log_IU]/mL Final   09/26/2019 3.0 (H) <2.1 [Log_IU]/mL Final   09/17/2019 3.9 (H) <2.1 [Log_IU]/mL Final   07/24/2019 3.5 (H) <2.1 [Log_IU]/mL Final   06/26/2019 <2.1 <2.1 [Log_IU]/mL Final   06/04/2019 Not Calculated <2.1 [Log_IU]/mL Final   05/24/2019 Not Calculated <2.1 [Log_IU]/mL Final   05/22/2019 Not Calculated <2.1 [Log_IU]/mL Final   05/15/2019 Not Calculated <2.1 [Log_IU]/mL Final   05/06/2019 Not Calculated <2.1 [Log_IU]/mL Final   04/30/2019 Not Calculated <2.1 [Log_IU]/mL Final   04/29/2019 Not Calculated <2.1 [Log_IU]/mL Final   04/26/2019 Not Calculated <2.1 [Log_IU]/mL Final   04/24/2019 Not Calculated <2.1 [Log_IU]/mL Final   04/17/2019 Not Calculated <2.1 [Log_IU]/mL Final    04/08/2019 Not Calculated <2.1 [Log_IU]/mL Final   04/01/2019 Not Calculated <2.1 [Log_IU]/mL Final   03/25/2019 Not Calculated <2.1 [Log_IU]/mL Final   03/18/2019 Not Calculated <2.1 [Log_IU]/mL Final       CMV resistance testing  No lab results found.  No results found for: CMVCID, CMVFOS, CMVGAN   EBV viral loads     Recent Labs   Lab Test 10/15/19  0835 09/17/19  1702 05/22/19  0926 04/29/19  0911 04/26/19  0848 03/27/19  0521   EBRES EBV DNA Not Detected EBV DNA Not Detected 541* EBV DNA Not Detected EBV DNA Not Detected EBV DNA Not Detected     EBV DNA Copies/mL   Date Value Ref Range Status   10/15/2019 EBV DNA Not Detected EBVNEG^EBV DNA Not Detected [Copies]/mL Final   09/17/2019 EBV DNA Not Detected EBVNEG^EBV DNA Not Detected [Copies]/mL Final   05/22/2019 541 (A) EBVNEG^EBV DNA Not Detected [Copies]/mL Final   04/29/2019 EBV DNA Not Detected EBVNEG^EBV DNA Not Detected [Copies]/mL Final   04/26/2019 EBV DNA Not Detected EBVNEG^EBV DNA Not Detected [Copies]/mL Final   03/27/2019 EBV DNA Not Detected EBVNEG^EBV DNA Not Detected [Copies]/mL Final       Human Herpes Virus 6 viral loads    No results found for: H6RES No results found for: H6SPEC    CMV Antibody IgG   Date Value Ref Range Status   02/23/2019 >8.0 (H) 0.0 - 0.8 AI Final     Comment:     Positive  Antibody index (AI) values reflect qualitative changes in antibody   concentration that cannot be directly associated with clinical condition or   disease state.     02/15/2019 >8.0 (H) 0.0 - 0.8 AI Final     Comment:     Positive  Antibody index (AI) values reflect qualitative changes in antibody   concentration that cannot be directly associated with clinical condition or   disease state.       Toxoplasma Antibody IgG   Date Value Ref Range Status   02/22/2019 <3.0 0.0 - 7.1 IU/mL Final     Comment:     Negative- Absence of detectable Toxoplasma gondii IgG antibodies. A negative   result does not rule out acute infection.  The magnitude of the  measured result is not indicative of the amount of   antibody present. The concentrations of anti-Toxoplasma gondii IgG in a given   specimen determined with assays from different manufacturers can vary due to   differences in assay methods and reagent specificity.         Imaging:  Results for orders placed or performed during the hospital encounter of 09/23/19   CT Chest Abdomen Pelvis w/o Contrast    Narrative    EXAMINATION: CT CHEST ABDOMEN PELVIS W/O CONTRAST  9/23/2019 6:27 PM      CLINICAL HISTORY: increase ab pain with n/v/d hx of c diff and cmv  infection. patient with FRANKLIN also-- no contrast.    COMPARISON: Chest CT on 5/22/2019, CT cap on 4/29/2019        PROCEDURE COMMENTS: CT of the chest, abdomen, and pelvis was performed  without intravenous and oral contrast. Axial MIP  images of the chest,  and coronal and sagittal reformatted images of the chest, abdomen, and  pelvis obtained.    FINDINGS:      Chest:  The trachea and central airways are clear.  There is no mass or  infiltration. Residual left upper lobe 4 mm nodularity (image 52  series 4), previously measured 10 x 8 mm. No significant pulmonary  nodules. No pleural effusion or pneumothorax.    Unchanged asymmetric enlarged left thyroid lobe. Postoperative changes  of heart transplant. Stable positioning of left brachiocephalic vein  stent. There is no pericardial effusion.  Normal thoracic vasculature.  No significant mediastinal, hilum or axillary lymphadenopathy.     Abdomen/pelvis:   Limited evaluation due to lack of IV contrast.    Liver: No focal mass. Unchanged multiple small hypoattenuating  lesions, too small to characterize.    Gallbladder: No radiopaque gallstones. No pericystic fluid.    Pancreas: No pancreatic mass or pancreatic duct dilatation.    Spleen: Not enlarged.    Adrenal glands: Within normal limits.    Urinary tract: Kidneys are normal in appearance. There is no evidence  for hydronephrosis or hydroureter. Urinary bladder  is unremarkable.    Reproductive organs: Within normal limits.    GI system: No abnormally dilated small bowel or colon. No definite  bowel wall thickening.    Peritoneum/fluid: No ascites     Vessels: No aneurysmal dilatation of the abdominal aorta.  The portal,  splenic, and superior mesenteric veins are patent.  The origins of the  celiac and superior mesenteric arteries are patent.    Lymph nodes: No enlarged lymphadenopathy. Numerous subcentimeter  mesenteric, retroperitoneal lymph nodes.    Bones and the soft tissues: No aggressive osseous lesions. Stable T12  compression deformity with approximately 30% vertebral body height  loss..      Impression    IMPRESSION:  1.  Continued decrease in size of left upper lung lesion with residual  nodularity, previously measured 10 x 8 mm, suggestive of  infectious/inflammatory process. No new pulmonary lesions.  2.  Limited evaluation of abdomen and pelvis due to lack of IV  contrast. Nonspecific numerous subcentimeter mesenteric and  retroperitoneal lymph nodes, likely reactive.  3.  Resolution of ascites.  4.  Blood pool density lower than myocardium, compatible with  patient's known anemia.    I have personally reviewed the examination and initial interpretation  and I agree with the findings.    LAUREN RICHARD MD   US Breast Left Limited 1-3 Quadrants    Narrative    Examination: LEFT BREAST ULTRASOUND, 9/24/2019 8:52 AM     Comparison: CT chest abdomen and pelvis 9/23/2019.    History/Family History: Left breast lump located circumferentially  around the nipple noted by patient approximately one month ago.  Swelling under left breast nipple. Evaluate for left breast abscess.    Findings: In the retroareolar left breast there is moderate  gynecomastia, as seen on CT one day prior. No fluid collection  concerning for abscess.      Impression    IMPRESSION: BI-RADS CATEGORY: 2 - Benign. Gynecomastia.    RECOMMENDED FOLLOW-UP: Clinical follow-up.      I have personally  reviewed the examination and initial interpretation  and I agree with the findings.    MADISON PEREZ MD   XR Abdomen Port 1 View    Narrative    XR ABDOMEN PORT 1 VW  9/24/2019 5:39 PM    History:  c diff.     Comparison: CT cap on 9/23/2019    Findings:   Portable supine AP abdominal radiograph. Nonobstructive bowel gas  pattern. No pneumatosis, portal venous gas. No acute osseous  abnormality. The visualized lung fields are clear.      Impression    IMPRESSION:  None obstructive bowel gas pattern without pneumatosis or portal  venous gas.    I have personally reviewed the examination and initial interpretation  and I agree with the findings.    MADISON PEREZ MD   XR Abdomen Port 1 View    Narrative    XR ABDOMEN PORT 1 VW  9/26/2019 5:05 PM    History:  C diff.     Comparison: Abdominal radiograph on 9/24/2019    Findings:   Supine portable AP abdominal radiograph. None obstructive bowel gas  pattern without pneumatosis or portal venous gas. No acute osseous  abnormality.      Impression    IMPRESSION:  Nonobstructive bowel gas pattern without pneumatosis or portal venous  gas.    I have personally reviewed the examination and initial interpretation  and I agree with the findings.    KISHA GUAMAN MD     Again, thank you for allowing me to participate in the care of your patient.      Sincerely,    Ric Shukla MD

## 2019-12-04 LAB
CMV DNA SPEC NAA+PROBE-ACNC: 307 [IU]/ML
CMV DNA SPEC NAA+PROBE-LOG#: 2.5 {LOG_IU}/ML
SPECIMEN SOURCE: ABNORMAL
TACROLIMUS BLD-MCNC: 6.7 UG/L (ref 5–15)
TME LAST DOSE: NORMAL H

## 2019-12-04 NOTE — PROGRESS NOTES
[facility]    Transplant Infectious Diseases Outpatient Progress note      Patient:  Everton Larios, Date of birth 1965, Medical record number [id number]  Date of Visit:  12/04/2019            Recommendations:   1. Will repeat CMV in one month or if he's symptomatic.   2. Good candidate for shingrix and pneumovax vaccines but he's waiting for next year for insurance issues.      RTC: as needed.         Summary of Presentation:   Transplants:  2/24/2019 (Heart), Postoperative day:  283     This patient is a 54 year old male with UC and congenital heart disease s/p OHT in 2/2019 with methylprednisolone induction and TAC/MMF for maintenance.   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         Active Problems and Infectious Diseases Issues:   1. CMV viremia.   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   He was treated with GCV with neutropenia adverse event.   Eventually we stopped VGCV around 10/17/2019 when VL was <2.1 log. No maintenance doses due to financial strain.     Starting 11/22/2019 he's been experiencing low grade viremia of 2.5 log. He is asymptomatic with no evidence of end-organ involvement including the GI tract.     Will check in one month or earlier if symptomatic.     2. High alk phos  Scheduled for MRCP and colonoscopy by GI for history of IBD.         Old Problems and Infectious Diseases Issues:   1. Drug induced neutropenia resolved after stopping bactrim and VGCV and with couple of doses of G-CSF.  2. P aeruginosa BSI 4/29/2019 with P aeruginosa HCAP and pulmonary nodules in the setting of febrile neutropenia. We treated him for severe pseudomonal pneumonia with 4 week course of ABx till 5/28/2019. He was also treated with IV micafungin till 5/28/2019 due to  pulmonary nodules and high risk for aspergillus given positive BD glucan. CT scan continues to improve while off of antimicrobilals as evidenced on a CT done this admission on 9/24/2019 while off of ABx and antifungals.    3. QTc prolongation. Prevented the use of azole and fluoroquinolones.   4. CDI 9/2019 treated with 2 weeks of PO vancomycin.       Other Infectious Disease issues include:  - QTc 532 9/23/2019.   - PCP prophylaxis: off of bactrim since 4/26/2019. Was given pentamidine till 8/2019.   - Serostatus: CMV D+/R+, EBV D+/R+, HSV1+/2-, VZV +                - Immunization status: good candidate for shingrix and pneumovax but he's waiting for it to be covered by his insurance.    - Gamma globulin status: 358 5/1/2019.       Attestation:  I interviewed the patient and obtained history from the patient, and by reviewing the patient's chart including outside records, microbiological data, and radiological data. All data are summarized in this notes.  Ric Shukla MD   Pager: [phone]  12/04/2019         Interim History:   No diarrhea no N/V.   He c/o flatulence and burping but no other complaints.         HPI:       Transplants:  2/24/2019 (Heart); Postoperative day:  283    This patient is a 54 year old male with congenital heart disease s/p OHT in 2/2019 with methylprednisolone induction and TAC/MMF/prednisone for maintenance.      The course was complicated by neutropenia late 4/2019. He was then admitted with fever, neutropenia dyspnea, and cough.   Was found to have multiple pulmonary nodules with GGO on chest CT. Blood cx on 4/29/2019 and BAL on 4/30/2019 grew P aeruginosa.   Treated with cefepime IV for 4 weeks.   Due to multiple risk factors for aspergillosis including neutropenia and positive BD glucan, he was also empirically treated with micafunin IV starting 5/6/2019.   We could not use azoles and fluoroquinolones due to prolonged QTc at baseline. Costs prohibited the use of isavuconazole.       Neutropenia resolved after the 2 doses of G-CSF on 5/1/2019 and 5/2/2019. Also after the discontinuation of VGCV and bactrim and decreasing the dose of MMF.       Due to neutropenia and inability to use VGCV, ID recommended weekly CMV PCR in the setting of increasing MMF.   In July of 2019 his CMV VL was 3.5 log, it was repeated in 9/2019 and was 3.9 log. He was started on VGCV adjusted to kidney function on 9/20/2019 till 10/17/2019 after 2 consecutive CMV PCR were <2.1 log. He was also complaining of diarrhea and abdominal pain. I thought the abdominal pain was due to CMV. He was evaluated the same day by GI due to hx of UC. His C diff test was positive and was treated with 14 days of vancomycin PO.             Immunizations:     Immunization History   Administered Date(s) Administered     Influenza (IIV3) PF 10/22/2008, 09/25/2009, 11/07/2011, 11/30/2012, 10/27/2013, 10/02/2014     Influenza Vaccine IM > 6 months Valent IIV4 10/02/2015     Influenza Vaccine, 3 YRS +, IM (QUADRIVALENT W/PRESERVATIVES) 09/12/2016, 10/16/2017, 09/20/2018     OPV, trivalent, live 10/20/1978     Pneumo Conj 13-V (2010&after) 09/26/2018     Pneumococcal 23 valent 09/28/2019     TDAP Vaccine (Boostrix) 07/20/2012     Td (Adult), Adsorbed 10/20/1978             Allergies:     Allergies   Allergen Reactions     Hydromorphone Other (See Comments)     Significant Delirium     Adhesive Tape Blisters     Tegaderm causes bruises, blisters and extreme irritation.     Lisinopril Other (See Comments)     hypotension     Quinolones Other (See Comments)     Would not rx quinolones until QTc interval improved.      Tobramycin Other (See Comments)     Would not use aminoglycosides as his kidney function is very borderline     Codeine Nausea             Medications:     Current Outpatient Medications   Medication Sig     acetaminophen (TYLENOL) 325 MG tablet Take 2 tablets (650 mg) by mouth every 4 hours as needed for mild pain     albuterol  (PROAIR HFA/PROVENTIL HFA/VENTOLIN HFA) 108 (90 Base) MCG/ACT inhaler Inhale 2 puffs into the lungs every 6 hours as needed for shortness of breath / dyspnea or wheezing     alendronate (FOSAMAX) 70 MG tablet Take 1 tablet (70 mg) by mouth every 7 days Take 60 minutes before am meal with 8 oz. water. Remain upright for 30 minutes.     amLODIPine (NORVASC) 5 MG tablet TAKE 1 TABLET BY MOUTH EVERY DAY     aspirin (ASA) 81 MG chewable tablet Take 1 tablet (81 mg) by mouth daily     calcium carbonate 600 mg-vitamin D 400 units (CALTRATE) 600-400 MG-UNIT per tablet Take 1 tablet by mouth 2 times daily (with meals)     cetirizine (ZYRTEC) 10 MG tablet Take 10 mg by mouth daily     DULoxetine (CYMBALTA) 20 MG EC capsule Take 20 mg by mouth daily     fluticasone (FLOVENT HFA) 220 MCG/ACT Inhaler Inhale 2 puffs into the lungs 2 times daily     levothyroxine (SYNTHROID/LEVOTHROID) 100 MCG tablet MON to SAT 1 tablet/day;SUN 0.5 tablet     magnesium oxide (MAG-OX) 400 MG tablet Take 1 tablet (400 mg) by mouth 2 times daily     melatonin 3 MG tablet Take 2 tablets (6 mg) by mouth At Bedtime (Patient taking differently: Take 5 mg by mouth At Bedtime )     mesalamine (ASACOL HD) 800 MG EC tablet Take 1 tablet (800 mg) by mouth 3 times daily     multivitamin w/minerals (THERA-VIT-M) tablet Take 1 tablet by mouth daily     mycophenolate (GENERIC EQUIVALENT) 250 MG capsule Take 6 capsules (1,500 mg) by mouth 2 times daily     omeprazole (PRILOSEC) 40 MG DR capsule Take 1 capsule (40 mg) by mouth daily     ondansetron (ZOFRAN-ODT) 4 MG ODT tab DISSOLVE 1 TABLET ON THE TONGUE EVERY EIGHT HOURS AS NEEDED     polyethylene glycol (MIRALAX/GLYCOLAX) powder Take 1 capful by mouth daily     rosuvastatin (CRESTOR) 10 MG tablet TAKE 1 TABLET BY MOUTH EVERY DAY     senna (SENOKOT) 8.6 MG tablet Take 2 tablets by mouth 2 times daily as needed for constipation     tacrolimus (GENERIC EQUIVALENT) 1 MG capsule Take 3 capsules (3 mg) by mouth 2  "times daily     valGANciclovir (VALCYTE) 450 MG tablet Take 1 tablet (450 mg) by mouth daily     No current facility-administered medications for this visit.             Review of Systems:   As mentioned in the interim history otherwise negative by reviewing constitutional symptoms, central and peripheral neurological systems, respiratory system, cardiac system, GI system,  system, musculoskeletal, skin, allergy, and lymphatics.                  Physical Exam:   /79   Pulse 109   Temp 98.4  F (36.9  C) (Oral)   Ht 1.689 m (5' 6.5\")   Wt 68.2 kg (150 lb 4.8 oz)   SpO2 100%   BMI 23.90 kg/m      Constitutional: awake, alert, cooperative, no apparent distress and appears at stated age, well nourished.            Laboratory Data:     No results found for: ACD4    Inflammatory Markers    Recent Labs   Lab Test 09/24/19  0435 09/23/19  1534 09/17/19  1702 05/15/19  0940 05/02/19  0536 04/29/19  0911 11/15/18  0955 10/22/18  1239   SED  --  17 18  --  37*  --   --   --    CRP <2.9 3.8 <2.9 <2.9 69.0* 120.0* 6.1 12.5       Immune Globulin Studies      Recent Labs   Lab Test 09/17/19  1702 05/01/19  1850   IGG  --  358*   IGM  --  29*   IGE  --  38    106       Metabolic Studies    Recent Labs   Lab Test 12/03/19  1851 11/22/19  0933 11/14/19  0929 11/07/19  0928 10/31/19  0936 10/24/19  0855 10/15/19  0835  09/23/19  1534  07/17/19  1030  06/17/19  0926  05/22/19  0926  04/29/19  0918  02/23/19  1835    141 140 140 139 139 139   < > 134   < > 135   < > 137   < > 138   < >  --    < > 133   POTASSIUM 4.5 4.4 4.6 4.5 4.5 4.5 4.6   < > 4.6   < > 4.2   < > 4.1   < > 3.7   < >  --    < > 3.6   CHLORIDE 108 111* 111* 110* 110* 108 107   < > 108   < > 103   < > 104   < > 102   < >  --    < > 93*   CO2 23 19* 21 20 20 23 24   < > 15*   < > 24   < > 20   < > 25   < >  --    < > 30   ANIONGAP 6 11 8 10 9 8 8   < > 11   < > 8   < > 13   < > 10   < >  --    < > 10   BUN 31* 29 30 29 26 39* 31*   < > 47*   < > " 17   < > 27   < > 53*   < >  --    < > 28   CR 1.33* 1.21 1.12 1.16 1.13 1.19 1.20   < > 2.41*   < > 1.07   < > 1.39*   < > 2.43*   < >  --    < > 1.03   GFRESTIMATED 59* 66 73 70 72 68 67   < > 29*   < > 77   < > 56*   < > 29*   < >  --    < > 81   GLC 95 120* 80 117* 103* 86 82   < > 96   < > 80   < > 80   < > 82   < >  --    < > 114*   A1C  --   --   --   --   --   --   --   --   --   --   --   --   --   --   --   --   --   --  5.7*   RADAMES 8.5 8.8 8.7 9.0 8.5 9.1 9.1   < > 9.0   < > 8.9   < > 8.8   < > 9.3   < >  --    < > 8.9   PHOS  --   --   --   --   --   --  4.2  --  4.8*  --  4.0  --  3.8   < > 4.2   < >  --    < > 3.6   MAG  --   --   --   --   --   --  1.8  --  2.0  --  1.7  --  1.4*   < > 1.9   < >  --    < > 2.0   LACT  --   --   --   --   --   --   --   --  1.2  --   --   --   --   --   --   --  1.7   < >  --    CKT  --   --   --   --   --   --  56  --   --   --  34  --  46  --  26*  --   --   --   --     < > = values in this interval not displayed.       Hepatic Studies    Recent Labs   Lab Test 12/03/19  1851 11/22/19  0933 11/14/19  0929 11/07/19  0928 10/31/19  0936 10/24/19  0855  03/14/19  1653  03/10/19  0410   BILITOTAL 0.4 0.4 0.5 0.5 0.4 0.4   < >  --    < > 0.8   ALKPHOS 187* 185* 195* 206* 217* 233*   < >  --    < > 104   PROTTOTAL 7.0 6.8 6.6* 6.9 6.8 7.0   < >  --    < > 5.0*   ALBUMIN 3.8 3.7 3.6 3.8 3.9 3.9   < >  --    < > 2.8*   AST 21 21 23 17 25 26   < >  --    < > 22   ALT 25 26 24 26 31 27   < >  --    < > 19   LDH  --   --   --   --   --   --   --  320*  --  276*    < > = values in this interval not displayed.       Hematology Studies     Recent Labs   Lab Test 12/03/19  1851 11/22/19  0933 11/14/19  0929 11/07/19  0928 10/31/19  0936 10/24/19  0855  09/28/19  0537 09/27/19  0446  09/24/19  0437  09/23/19  1534 09/17/19  1702 09/17/19  0856   WBC 3.3* 3.6* 4.0 4.3 4.5 3.7*   < > 3.5* 4.0   < > 3.6*  --  4.6 3.7* 3.5*   ANEU  --   --   --   --   --   --   --  2.7 2.9  --  1.9  --   2.9 2.0 1.9   ALYM  --   --   --   --   --   --   --  0.5* 0.9  --  0.8  --  1.0 1.1 0.9   GRACE  --   --   --   --   --   --   --  0.1 0.1  --  0.4  --  0.4 0.4 0.5   AEOS  --   --   --   --   --   --   --  0.2 0.1  --  0.3  --  0.3 0.3 0.3   HGB 9.8* 9.1* 8.7* 8.9* 9.0* 9.0*   < > 8.0* 7.7*   < > 7.6*  --  10.2* 10.1* 9.5*   HCT 30.9* 29.0* 27.9* 28.6* 28.4* 28.7*   < > 24.8* 24.2*   < > 24.2*  --  32.7* 32.8* 28.5*    241 232 244 319 321   < > 247 231   < > 234   < > 290 284 279    < > = values in this interval not displayed.       Clotting Studies    Recent Labs   Lab Test 09/23/19  1534 05/08/19  0504 05/07/19  0451 05/06/19  0438  03/14/19  1653  02/27/19  0421 02/25/19  1502   INR 1.17* 1.22* 1.33* 1.31*   < > 1.36*   < >  --  1.29*   PTT 35  --   --   --   --  46*  --  38* 32    < > = values in this interval not displayed.       Urine Studies    Recent Labs   Lab Test 05/26/19  1006 04/29/19  1031 03/26/19  1707 03/17/19  0045 03/04/19  1303   URINEPH 6.5 6.0 5.5 5.5 5.0   NITRITE Negative Negative Negative Negative Negative   LEUKEST Negative Negative Negative Negative Negative   WBCU 0 <1 2 15* 0         Microbiology:  Last 6 Culture results with specimen source  Culture Micro   Date Value Ref Range Status   09/23/2019 No growth  Final   05/02/2019 No growth  Final   05/02/2019 No growth  Final   05/02/2019 (A)  Final    Canceled, Test credited  >10 Squamous epithelial cells/low power field indicates oral contamination. Please   recollect.     05/01/2019 No growth  Final   05/01/2019 No growth  Final    Specimen Description   Date Value Ref Range Status   09/26/2019 Feces  Final   09/23/2019 Blood Left Arm  Final   09/20/2019 Feces  Final   09/20/2019 Feces  Final   09/20/2019 Feces  Final   05/02/2019 Blood Right Hand  Final   05/02/2019 Blood Right Hand  Final        Last check of C difficile  C Diff Toxin B PCR   Date Value Ref Range Status   09/20/2019 Positive (A) NEG^Negative Final     Comment:      Positive: Toxin producing C. difficile target DNA sequences detected, presumed   positive for C. difficile toxin B. C. difficile (Requires Enteric Isolation).      Clostridium difficile (Requires Enteric Isolation)  FDA approved assay performed using [employer] GeneXpert real-time PCR.           Virology:  CMV viral loads    CMV viral loads  No results found for: 15233, 22355, 51751, 35768  CMV viral loads    Recent Labs   Lab Test 12/03/19  1851 11/22/19  0935   CSPEC Plasma EDTA PLASMA   CMVLOG 2.5* 2.5*       CMV viral loads    Log IU/mL of CMVQNT   Date Value Ref Range Status   12/03/2019 2.5 (H) <2.1 [Log_IU]/mL Final   11/22/2019 2.5 (H) <2.1 [Log_IU]/mL Final   11/14/2019 <2.1 <2.1 [Log_IU]/mL Final   11/07/2019 2.2 (H) <2.1 [Log_IU]/mL Final   10/31/2019 <2.1 <2.1 [Log_IU]/mL Final   10/24/2019 <2.1 <2.1 [Log_IU]/mL Final   10/15/2019 <2.1 <2.1 [Log_IU]/mL Final   10/03/2019 2.6 (H) <2.1 [Log_IU]/mL Final   09/26/2019 3.0 (H) <2.1 [Log_IU]/mL Final   09/17/2019 3.9 (H) <2.1 [Log_IU]/mL Final   07/24/2019 3.5 (H) <2.1 [Log_IU]/mL Final   06/26/2019 <2.1 <2.1 [Log_IU]/mL Final   06/04/2019 Not Calculated <2.1 [Log_IU]/mL Final   05/24/2019 Not Calculated <2.1 [Log_IU]/mL Final   05/22/2019 Not Calculated <2.1 [Log_IU]/mL Final   05/15/2019 Not Calculated <2.1 [Log_IU]/mL Final   05/06/2019 Not Calculated <2.1 [Log_IU]/mL Final   04/30/2019 Not Calculated <2.1 [Log_IU]/mL Final   04/29/2019 Not Calculated <2.1 [Log_IU]/mL Final   04/26/2019 Not Calculated <2.1 [Log_IU]/mL Final   04/24/2019 Not Calculated <2.1 [Log_IU]/mL Final   04/17/2019 Not Calculated <2.1 [Log_IU]/mL Final   04/08/2019 Not Calculated <2.1 [Log_IU]/mL Final   04/01/2019 Not Calculated <2.1 [Log_IU]/mL Final   03/25/2019 Not Calculated <2.1 [Log_IU]/mL Final   03/18/2019 Not Calculated <2.1 [Log_IU]/mL Final       CMV resistance testing  No lab results found.  No results found for: CMVCID, CMVFOS, CMVGAN   EBV viral loads     Recent Labs    Lab Test 10/15/19  0835 09/17/19  1702 05/22/19  0926 04/29/19  0911 04/26/19  0848 03/27/19  0521   EBRES EBV DNA Not Detected EBV DNA Not Detected 541* EBV DNA Not Detected EBV DNA Not Detected EBV DNA Not Detected     EBV DNA Copies/mL   Date Value Ref Range Status   10/15/2019 EBV DNA Not Detected EBVNEG^EBV DNA Not Detected [Copies]/mL Final   09/17/2019 EBV DNA Not Detected EBVNEG^EBV DNA Not Detected [Copies]/mL Final   05/22/2019 541 (A) EBVNEG^EBV DNA Not Detected [Copies]/mL Final   04/29/2019 EBV DNA Not Detected EBVNEG^EBV DNA Not Detected [Copies]/mL Final   04/26/2019 EBV DNA Not Detected EBVNEG^EBV DNA Not Detected [Copies]/mL Final   03/27/2019 EBV DNA Not Detected EBVNEG^EBV DNA Not Detected [Copies]/mL Final       Human Herpes Virus 6 viral loads    No results found for: H6RES No results found for: H6SPEC    CMV Antibody IgG   Date Value Ref Range Status   02/23/2019 >8.0 (H) 0.0 - 0.8 AI Final     Comment:     Positive  Antibody index (AI) values reflect qualitative changes in antibody   concentration that cannot be directly associated with clinical condition or   disease state.     02/15/2019 >8.0 (H) 0.0 - 0.8 AI Final     Comment:     Positive  Antibody index (AI) values reflect qualitative changes in antibody   concentration that cannot be directly associated with clinical condition or   disease state.       Toxoplasma Antibody IgG   Date Value Ref Range Status   02/22/2019 <3.0 0.0 - 7.1 IU/mL Final     Comment:     Negative- Absence of detectable Toxoplasma gondii IgG antibodies. A negative   result does not rule out acute infection.  The magnitude of the measured result is not indicative of the amount of   antibody present. The concentrations of anti-Toxoplasma gondii IgG in a given   specimen determined with assays from different manufacturers can vary due to   differences in assay methods and reagent specificity.         Imaging:  Results for orders placed or performed during the  hospital encounter of 09/23/19   CT Chest Abdomen Pelvis w/o Contrast    Narrative    EXAMINATION: CT CHEST ABDOMEN PELVIS W/O CONTRAST  9/23/2019 6:27 PM      CLINICAL HISTORY: increase ab pain with n/v/d hx of c diff and cmv  infection. patient with FRANKLIN also-- no contrast.    COMPARISON: Chest CT on 5/22/2019, CT cap on 4/29/2019        PROCEDURE COMMENTS: CT of the chest, abdomen, and pelvis was performed  without intravenous and oral contrast. Axial MIP  images of the chest,  and coronal and sagittal reformatted images of the chest, abdomen, and  pelvis obtained.    FINDINGS:      Chest:  The trachea and central airways are clear.  There is no mass or  infiltration. Residual left upper lobe 4 mm nodularity (image 52  series 4), previously measured 10 x 8 mm. No significant pulmonary  nodules. No pleural effusion or pneumothorax.    Unchanged asymmetric enlarged left thyroid lobe. Postoperative changes  of heart transplant. Stable positioning of left brachiocephalic vein  stent. There is no pericardial effusion.  Normal thoracic vasculature.  No significant mediastinal, hilum or axillary lymphadenopathy.     Abdomen/pelvis:   Limited evaluation due to lack of IV contrast.    Liver: No focal mass. Unchanged multiple small hypoattenuating  lesions, too small to characterize.    Gallbladder: No radiopaque gallstones. No pericystic fluid.    Pancreas: No pancreatic mass or pancreatic duct dilatation.    Spleen: Not enlarged.    Adrenal glands: Within normal limits.    Urinary tract: Kidneys are normal in appearance. There is no evidence  for hydronephrosis or hydroureter. Urinary bladder is unremarkable.    Reproductive organs: Within normal limits.    GI system: No abnormally dilated small bowel or colon. No definite  bowel wall thickening.    Peritoneum/fluid: No ascites     Vessels: No aneurysmal dilatation of the abdominal aorta.  The portal,  splenic, and superior mesenteric veins are patent.  The origins of  the  celiac and superior mesenteric arteries are patent.    Lymph nodes: No enlarged lymphadenopathy. Numerous subcentimeter  mesenteric, retroperitoneal lymph nodes.    Bones and the soft tissues: No aggressive osseous lesions. Stable T12  compression deformity with approximately 30% vertebral body height  loss..      Impression    IMPRESSION:  1.  Continued decrease in size of left upper lung lesion with residual  nodularity, previously measured 10 x 8 mm, suggestive of  infectious/inflammatory process. No new pulmonary lesions.  2.  Limited evaluation of abdomen and pelvis due to lack of IV  contrast. Nonspecific numerous subcentimeter mesenteric and  retroperitoneal lymph nodes, likely reactive.  3.  Resolution of ascites.  4.  Blood pool density lower than myocardium, compatible with  patient's known anemia.    I have personally reviewed the examination and initial interpretation  and I agree with the findings.    LAUREN RICHARD MD   US Breast Left Limited 1-3 Quadrants    Narrative    Examination: LEFT BREAST ULTRASOUND, 9/24/2019 8:52 AM     Comparison: CT chest abdomen and pelvis 9/23/2019.    History/Family History: Left breast lump located circumferentially  around the nipple noted by patient approximately one month ago.  Swelling under left breast nipple. Evaluate for left breast abscess.    Findings: In the retroareolar left breast there is moderate  gynecomastia, as seen on CT one day prior. No fluid collection  concerning for abscess.      Impression    IMPRESSION: BI-RADS CATEGORY: 2 - Benign. Gynecomastia.    RECOMMENDED FOLLOW-UP: Clinical follow-up.      I have personally reviewed the examination and initial interpretation  and I agree with the findings.    MADISON PEREZ MD   XR Abdomen Port 1 View    Narrative    XR ABDOMEN PORT 1 VW  9/24/2019 5:39 PM    History:  c diff.     Comparison: CT cap on 9/23/2019    Findings:   Portable supine AP abdominal radiograph. Nonobstructive bowel  gas  pattern. No pneumatosis, portal venous gas. No acute osseous  abnormality. The visualized lung fields are clear.      Impression    IMPRESSION:  None obstructive bowel gas pattern without pneumatosis or portal  venous gas.    I have personally reviewed the examination and initial interpretation  and I agree with the findings.    MADISON PEREZ MD   XR Abdomen Port 1 View    Narrative    XR ABDOMEN PORT 1 VW  9/26/2019 5:05 PM    History:  C diff.     Comparison: Abdominal radiograph on 9/24/2019    Findings:   Supine portable AP abdominal radiograph. None obstructive bowel gas  pattern without pneumatosis or portal venous gas. No acute osseous  abnormality.      Impression    IMPRESSION:  Nonobstructive bowel gas pattern without pneumatosis or portal venous  gas.    I have personally reviewed the examination and initial interpretation  and I agree with the findings.    KISHA GUAMAN MD       Answers for HPI/ROS submitted by the patient on 11/26/2019   General Symptoms: No  Skin Symptoms: No  HENT Symptoms: No  EYE SYMPTOMS: No  HEART SYMPTOMS: Yes  LUNG SYMPTOMS: No  INTESTINAL SYMPTOMS: No  URINARY SYMPTOMS: No  REPRODUCTIVE SYMPTOMS: No  SKELETAL SYMPTOMS: Yes  BLOOD SYMPTOMS: No  NERVOUS SYSTEM SYMPTOMS: No  MENTAL HEALTH SYMPTOMS: No  Chest pain or pressure: Yes  Fast or irregular heartbeat: No  Pain in legs with walking: Yes  Trouble breathing while lying down: No  Fingers or toes appear blue: No  High blood pressure: No  Low blood pressure: No  Fainting: No  Murmurs: No  Pacemaker: No  Varicose veins: No  Edema or swelling: No  Wake up at night with shortness of breath: No  Light-headedness: No  Exercise intolerance: No  Back pain: Yes  Muscle aches: Yes  Neck pain: Yes  Swollen joints: Yes  Joint pain: Yes  Bone pain: Yes  Muscle cramps: Yes  Muscle weakness: No  Joint stiffness: Yes  Bone fracture: No

## 2019-12-06 ENCOUNTER — HOSPITAL ENCOUNTER (OUTPATIENT)
Dept: MRI IMAGING | Facility: HOSPITAL | Age: 54
Discharge: HOME | End: 2019-12-06

## 2019-12-06 ENCOUNTER — HOSPITAL ENCOUNTER (OUTPATIENT)
Dept: RADIOLOGY | Facility: HOSPITAL | Age: 54
Discharge: HOME | End: 2019-12-06

## 2019-12-06 DIAGNOSIS — K51.019 ULCERATIVE PANCOLITIS WITH COMPLICATION (H): ICD-10-CM

## 2019-12-10 DIAGNOSIS — I10 BENIGN ESSENTIAL HYPERTENSION: ICD-10-CM

## 2019-12-11 ENCOUNTER — OFFICE VISIT - HEALTHEAST (OUTPATIENT)
Dept: INTERNAL MEDICINE | Facility: CLINIC | Age: 54
End: 2019-12-11

## 2019-12-11 DIAGNOSIS — E03.9 HYPOTHYROIDISM, UNSPECIFIED TYPE: ICD-10-CM

## 2019-12-11 DIAGNOSIS — M25.562 CHRONIC PAIN OF LEFT KNEE: ICD-10-CM

## 2019-12-11 DIAGNOSIS — M25.571 CHRONIC ANKLE PAIN, BILATERAL: ICD-10-CM

## 2019-12-11 DIAGNOSIS — G89.29 CHRONIC PAIN OF LEFT KNEE: ICD-10-CM

## 2019-12-11 DIAGNOSIS — M81.0 OSTEOPOROSIS, UNSPECIFIED OSTEOPOROSIS TYPE, UNSPECIFIED PATHOLOGICAL FRACTURE PRESENCE: ICD-10-CM

## 2019-12-11 DIAGNOSIS — M25.572 CHRONIC ANKLE PAIN, BILATERAL: ICD-10-CM

## 2019-12-11 DIAGNOSIS — K51.90 ULCERATIVE COLITIS WITHOUT COMPLICATIONS, UNSPECIFIED LOCATION (H): ICD-10-CM

## 2019-12-11 DIAGNOSIS — G89.29 CHRONIC ANKLE PAIN, BILATERAL: ICD-10-CM

## 2019-12-11 DIAGNOSIS — Z94.1 HEART REPLACED BY TRANSPLANT (H): ICD-10-CM

## 2019-12-11 NOTE — PROGRESS NOTES
I performed a history and physical examination of the above patient and discussed the management with Dr. Jensen on 11/26/2019. I reviewed the note and there are no changes to the past medical, family or social history.  A complete 10 point review of systems was obtained. Please see the HPI for pertinent positives and negatives. All other systems were reviewed and were found to be negative.      I agree with the documented findings and plan of care as outlined.     Genevieve Allen MD  GI Attending  Pager: [phone]

## 2019-12-13 ENCOUNTER — PRE VISIT (OUTPATIENT)
Dept: CARDIOLOGY | Facility: CLINIC | Age: 54
End: 2019-12-13

## 2019-12-13 DIAGNOSIS — Z94.1 TRANSPLANTED HEART (H): Primary | ICD-10-CM

## 2019-12-13 RX ORDER — AMLODIPINE BESYLATE 5 MG/1
TABLET ORAL
Qty: 90 TABLET | Refills: 0 | Status: SHIPPED | OUTPATIENT
Start: 2019-12-13 | End: 2020-01-14

## 2019-12-18 ENCOUNTER — OFFICE VISIT (OUTPATIENT)
Dept: CARDIOLOGY | Facility: CLINIC | Age: 54
End: 2019-12-18
Attending: INTERNAL MEDICINE
Payer: COMMERCIAL

## 2019-12-18 ENCOUNTER — RECORDS - HEALTHEAST (OUTPATIENT)
Dept: ADMINISTRATIVE | Facility: OTHER | Age: 54
End: 2019-12-18

## 2019-12-18 VITALS
HEART RATE: 105 BPM | BODY MASS INDEX: 22.96 KG/M2 | WEIGHT: 151.5 LBS | OXYGEN SATURATION: 100 % | HEIGHT: 68 IN | DIASTOLIC BLOOD PRESSURE: 79 MMHG | SYSTOLIC BLOOD PRESSURE: 119 MMHG

## 2019-12-18 DIAGNOSIS — Z94.1 TRANSPLANTED HEART (H): ICD-10-CM

## 2019-12-18 DIAGNOSIS — Z94.1 TRANSPLANTED HEART (H): Primary | ICD-10-CM

## 2019-12-18 DIAGNOSIS — B25.9 CYTOMEGALOVIRUS (CMV) VIREMIA (H): ICD-10-CM

## 2019-12-18 LAB
ALBUMIN SERPL-MCNC: 3.8 G/DL (ref 3.4–5)
ALP SERPL-CCNC: 204 U/L (ref 40–150)
ALT SERPL W P-5'-P-CCNC: 27 U/L (ref 0–70)
ANION GAP SERPL CALCULATED.3IONS-SCNC: 7 MMOL/L (ref 3–14)
AST SERPL W P-5'-P-CCNC: 10 U/L (ref 0–45)
BASOPHILS # BLD AUTO: 0 10E9/L (ref 0–0.2)
BASOPHILS NFR BLD AUTO: 0.6 %
BILIRUB SERPL-MCNC: 0.4 MG/DL (ref 0.2–1.3)
BUN SERPL-MCNC: 27 MG/DL (ref 7–30)
CALCIUM SERPL-MCNC: 8.5 MG/DL (ref 8.5–10.1)
CHLORIDE SERPL-SCNC: 110 MMOL/L (ref 94–109)
CK SERPL-CCNC: 141 U/L (ref 30–300)
CO2 SERPL-SCNC: 23 MMOL/L (ref 20–32)
CREAT SERPL-MCNC: 1.29 MG/DL (ref 0.66–1.25)
DIFFERENTIAL METHOD BLD: ABNORMAL
EOSINOPHIL # BLD AUTO: 0.2 10E9/L (ref 0–0.7)
EOSINOPHIL NFR BLD AUTO: 6.2 %
ERYTHROCYTE [DISTWIDTH] IN BLOOD BY AUTOMATED COUNT: 13.3 % (ref 10–15)
GFR SERPL CREATININE-BSD FRML MDRD: 61 ML/MIN/{1.73_M2}
GLUCOSE SERPL-MCNC: 101 MG/DL (ref 70–99)
HCT VFR BLD AUTO: 31 % (ref 40–53)
HGB BLD-MCNC: 9.6 G/DL (ref 13.3–17.7)
IMM GRANULOCYTES # BLD: 0 10E9/L (ref 0–0.4)
IMM GRANULOCYTES NFR BLD: 0.6 %
LYMPHOCYTES # BLD AUTO: 1 10E9/L (ref 0.8–5.3)
LYMPHOCYTES NFR BLD AUTO: 32 %
MAGNESIUM SERPL-MCNC: 2 MG/DL (ref 1.6–2.3)
MCH RBC QN AUTO: 27 PG (ref 26.5–33)
MCHC RBC AUTO-ENTMCNC: 31 G/DL (ref 31.5–36.5)
MCV RBC AUTO: 87 FL (ref 78–100)
MONOCYTES # BLD AUTO: 0.5 10E9/L (ref 0–1.3)
MONOCYTES NFR BLD AUTO: 14.3 %
NEUTROPHILS # BLD AUTO: 1.5 10E9/L (ref 1.6–8.3)
NEUTROPHILS NFR BLD AUTO: 46.3 %
NRBC # BLD AUTO: 0 10*3/UL
NRBC BLD AUTO-RTO: 0 /100
PHOSPHATE SERPL-MCNC: 3.5 MG/DL (ref 2.5–4.5)
PLATELET # BLD AUTO: 213 10E9/L (ref 150–450)
POTASSIUM SERPL-SCNC: 4.5 MMOL/L (ref 3.4–5.3)
PROT SERPL-MCNC: 6.8 G/DL (ref 6.8–8.8)
RBC # BLD AUTO: 3.56 10E12/L (ref 4.4–5.9)
SODIUM SERPL-SCNC: 140 MMOL/L (ref 133–144)
TACROLIMUS BLD-MCNC: 5.5 UG/L (ref 5–15)
TME LAST DOSE: NORMAL H
WBC # BLD AUTO: 3.2 10E9/L (ref 4–11)

## 2019-12-18 PROCEDURE — 80197 ASSAY OF TACROLIMUS: CPT | Performed by: INTERNAL MEDICINE

## 2019-12-18 PROCEDURE — 99214 OFFICE O/P EST MOD 30 MIN: CPT | Mod: ZP | Performed by: INTERNAL MEDICINE

## 2019-12-18 PROCEDURE — 82550 ASSAY OF CK (CPK): CPT | Performed by: INTERNAL MEDICINE

## 2019-12-18 PROCEDURE — 36415 COLL VENOUS BLD VENIPUNCTURE: CPT | Performed by: INTERNAL MEDICINE

## 2019-12-18 PROCEDURE — 84100 ASSAY OF PHOSPHORUS: CPT | Performed by: INTERNAL MEDICINE

## 2019-12-18 PROCEDURE — G0463 HOSPITAL OUTPT CLINIC VISIT: HCPCS | Mod: ZF

## 2019-12-18 PROCEDURE — 80053 COMPREHEN METABOLIC PANEL: CPT | Performed by: INTERNAL MEDICINE

## 2019-12-18 PROCEDURE — 83735 ASSAY OF MAGNESIUM: CPT | Performed by: INTERNAL MEDICINE

## 2019-12-18 ASSESSMENT — MIFFLIN-ST. JEOR: SCORE: 1491.7

## 2019-12-18 ASSESSMENT — PAIN SCALES - GENERAL: PAINLEVEL: NO PAIN (0)

## 2019-12-18 NOTE — NURSING NOTE
Chief Complaint   Patient presents with     Follow Up     Return Heart Transplant     Vitals were taken and medications reconciled.     Anthony Lara CMA  10:54 AM

## 2019-12-18 NOTE — LETTER
2019      Patient Name:  Everton Larios  :  1965  MRN:  [id number]   ICD10: z94.1, z79.899    Subject:  Return to work     Everton Larios is a heart transplant patient currently being followed by the [facility].  At this point, we are comfortable with him returning to work.  We recommend that he resume his usual work responsibilities, as he can tolerate.  We only ask that he be permitted rest breaks, if needed, and that he escalate his work responsibilities as he can tolerate. We prefer his job responsibilities include limited exposure to customer contact if possible.      If you have any questions, please feel free to contact our coordinators at [phone].    Sincerely,

## 2019-12-18 NOTE — LETTER
2019      Patient Name:  Everton Larios  :  1965  MRN:  [id number]   ICD10: z94.1, z79.899    Subject:  Return to work     Everton Larios is a heart transplant patient currently being followed by the [facility].  At this point, we are comfortable with him returning to work.  We recommend that he resume his usual work responsibilities, as he can tolerate.  We only ask that he be permitted rest breaks, if needed, and that he escalate his work responsibilities as he can tolerate.    If you have any questions, please feel free to contact our coordinators at [phone].    Sincerely,

## 2019-12-18 NOTE — LETTER
12/18/2019      RE: Everton Larios  [address] [facility] [address]       Dear Colleague,    Thank you for the opportunity to participate in the care of your patient, Everton Larios, at the [facility] HEART [facility] at [facility]. Please see a copy of my visit note below.     Advanced Heart Failure and Transplant Clinic       HPI:  Everton Larios is a 54 year old male with a history of ASD (s/p repair in childhood), AFib/AF (s/p ablation), NICM, diastolic dysfunction, alcohol abuse, Pierce's esophagus, ulcerative colitis, GIB (s/p splenic embolization), and hypothyroidism, now s/p OHT and bypass of persistent left SVC to right atrial appendage 2/24/19 who presents to clinic for routine surveillance. This is his 10 month post-transplant visit.    His postoperative course was c/b shock due to RV dysfunction requiring IABP support, small pneumoperitoneum (clinically insignificant), chest wall hematoma (former ICD site, s/p evacuation and drain placement 3/31/19), HCAP/klebsiella pneumonia, and FRANKLIN (required short-term HD, now recovered).      His biopsy 3/25/19 showed mild AMR1 with some staining for c4d. Repeat biopsy 3/28 showed improved AMR and less reactive capillary staining, and subsequent biopsies have now shown resolution of AMR and improved capillary staining.  Baseline coronary angiogram was previously deferred due to renal function.  His last echocardiogram 12/18/19 showed stable graft function with LVEF 60-65%, normal RV function.     He had a fall in early April, and was found to have a compression fracture. Back pain is improving today.     He was hospitalized 4/28-5/9 with fevers, URI symptoms, abdominal pain, and diarrhea, and was found to be neutropenic with pseudomonas bacteremia and HCAP.  Chest CT showed diffuse bilateral solid with peripheral groundglass pulmonary nodules/opacities and mediastinal adenopathy, BAL grew pseudomonas, and  fungitell was positive.  Transplant ID was consulted, and he was treated with 4 weeks of IV cefepime and micafungin.  Repeat CT 5/22 showed significantly decreased bilateral nodular and consolidative opacities.    He was then hospitalized from 9/23-9/28/19 with CMV viremia and C diff colitis. Had diarrhea. 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 Did not have a colonoscopy.    Since his last clinic visit, he has been feeling well overall. He has had some ongoing back pain, left elbow pain and left knee pain, all chronic for him. These symptoms are been exacerbated by extended standing as a  and repetitive movements.     For the last month he reports a very mild cough and sinus congestion which he attributes to the dry air. He does not have any fever/chills, sore throat, swollen lymph nodes, productive cough, or any other complaints. He denies abdominal pain, N/V/D. All diarrhea that he had had in the past has resolved. He denies any edema, orthopnea, or PND.    Past Medical History:   Diagnosis Date     Alcohol abuse      Pierce's esophagus 10/4/2018     Chronic rhinitis 10/4/2018     Chronic systolic heart failure (H) 10/4/2018     Depression 10/4/2018     Diastolic dysfunction      DJD (degenerative joint disease) - neck 10/4/2018     H/O congenital atrial septal defect (ASD) repair at age 3 10/4/2018     Hypothyroidism due to medication 10/4/2018     ICD, [employer] scientific 2008; gen change 2/2018 10/4/2018     Intermittent asthma without complication 10/4/2018     Nonischemic cardiomyopathy (H) 10/4/2018     On amiodarone therapy 10/4/2018     Paroxysmal atrial fibrillation (H) 10/4/2018     S/P ablation of atrial fibrillation 10/4/2018     Systolic heart failure (H)      Ulcerative colitis (H) 2005     Ventricular tachycardia (H) 10/4/2018       Past Surgical History:   Procedure Laterality Date     AICD, DUAL CHAMBER       BRONCHOSCOPY (RIGID OR FLEXIBLE), DIAGNOSTIC N/A 4/30/2019    Procedure: BRONCHOSCOPY,  WITH BAL;  Surgeon: Margot Chiang MD;  Location: [facility]     CV HEART BIOPSY N/A 3/4/2019    Procedure: Heart Biopsy;  Surgeon: Abhijeet Titsu MD;  [facility]     CV HEART BIOPSY N/A 3/25/2019    Procedure: Heart Biopsy;  Surgeon: Yves Rodrigues MD;  Location:  HEART CARDIAC CATH LAB     CV HEART BIOPSY N/A 3/28/2019    Procedure: Heart Cath Heart Biopsy;  Surgeon: Yves Rodrigues MD;  Location:  HEART CARDIAC CATH LAB     CV HEART BIOPSY N/A 4/10/2019    Procedure: CV HEART BIOPSY;  Surgeon: Isiah Mcallister MD;  Location:  HEART CARDIAC CATH LAB     CV HEART BIOPSY N/A 4/24/2019    Procedure: CV HEART BIOPSY;  Surgeon: Isiah Mcallister MD;  Location:  HEART CARDIAC CATH LAB     CV HEART BIOPSY N/A 5/8/2019    Procedure: CV HEART BIOPSY;  Surgeon: Isiah Mcallister MD;  Location:  HEART CARDIAC CATH LAB     CV HEART BIOPSY N/A 6/4/2019    Procedure: CV HEART BIOPSY;  Surgeon: Alton Solomon MD;  Location:  HEART CARDIAC CATH LAB     CV HEART BIOPSY N/A 5/22/2019    Procedure: CV HEART BIOPSY;  Surgeon: Yves Rodrigues MD;  Location:  HEART CARDIAC CATH LAB     CV HEART BIOPSY N/A 7/17/2019    Procedure: CV HEART BIOPSY;  Surgeon: Isiah Mcallister MD;  Location:  HEART CARDIAC CATH LAB     CV HEART BIOPSY N/A 8/13/2019    Procedure: CV HEART BIOPSY;  Surgeon: Jackson Patten MD;  Location:  HEART CARDIAC CATH LAB     CV HEART BIOPSY N/A 10/15/2019    Procedure: CV HEART BIOPSY;  Surgeon: Santino Mckeon MD;  Location:  HEART CARDIAC CATH LAB     CV INTRA-AORTIC BALLOON PUMP INSERTION N/A 2/18/2019    Procedure: Intra-Aortic Balloon;  Surgeon: Alton Solomon MD;  Location:  HEART CARDIAC CATH LAB     CV INTRA-AORTIC BALLOON PUMP INSERTION N/A 2/20/2019    Procedure: Replace subclavian IABP;  Surgeon: Yves Rodrigues MD;  Location:  HEART CARDIAC CATH LAB     CV LEFT HEART CATH N/A 3/19/2019     Procedure: Left Heart Cath;  Surgeon: Yves Rodrigues MD;  [facility]     CV RIGHT HEART CATH N/A 3/19/2019    Procedure: RHC/HBx - Femoral access for 3/19 per Nimisha GONZALEZ;  Surgeon: Yves Rodrigues MD;  Location:  HEART CARDIAC CATH LAB     CV RIGHT HEART CATH N/A 3/25/2019    Procedure: Right Heart Cath;  Surgeon: Yves Rodrigues MD;  Location:  HEART CARDIAC CATH LAB     CV RIGHT HEART CATH N/A 3/28/2019    Procedure: Heart Cath Right Heart Cath;  Surgeon: Yves Rodrigues MD;  [facility]     CV RIGHT HEART CATH N/A 4/24/2019    Procedure: CV RIGHT HEART CATH;  Surgeon: Isiah Mcallister MD;  Location:  HEART CARDIAC CATH LAB     CV RIGHT HEART CATH N/A 3/11/2019    Procedure: ADD ON RHC/HBX;  Surgeon: Alton Solomon MD;  Location:  HEART CARDIAC CATH LAB     CV RIGHT HEART CATH N/A 6/4/2019    Procedure: CV RIGHT HEART CATH;  Surgeon: Alton Solomon MD;  Location:  HEART CARDIAC CATH LAB     CV RIGHT HEART CATH N/A 5/22/2019    Procedure: CV RIGHT HEART CATH;  Surgeon: Yves Rodrigues MD;  Location:  HEART CARDIAC CATH LAB     CV RIGHT HEART CATH N/A 8/13/2019    Procedure: CV RIGHT HEART CATH;  Surgeon: Jackson Patten MD;  Location:  HEART CARDIAC CATH LAB     CV RIGHT HEART CATH N/A 10/15/2019    Procedure: CV RIGHT HEART CATH;  Surgeon: Santino Mckeon MD;  Location:  HEART CARDIAC CATH LAB     INCISION AND DRAINAGE CHEST WASHOUT, COMBINED N/A 3/21/2019    Procedure: Incision And Drainage; Evacuation Right Chest Wall Hematoma;  Surgeon: Dewayne House MD;  [facility]     INSERT INTRAAORTIC BALLOON PUMP Left 2/19/2019    Procedure: Insert left  Subclavian Balloon Pump,  Removal Right femoral arterial balloom pump sheath;  Surgeon: Dewayne House MD;  [facility]     INSERT INTRAAORTIC BALLOON PUMP Left 2/21/2019    Procedure: SUBCLAVIAN BALLOON PUMP PLACEMENT;   Surgeon: Ben White MD;  [facility]     IR PICC PLACEMENT > 5 YRS OF AGE  2019     TRANSPLANT HEART RECIPIENT N/A 2019    Procedure: TRANSPLANT HEART RECIPIENT;  Surgeon: Dewayne House MD;  [facility]       Family History   Problem Relation Age of Onset     Endometrial Cancer Mother      Osteoporosis Mother      Hypertension Father      Endometrial Cancer Maternal Grandmother      Hip fracture No family hx of      Nephrolithiasis No family hx of        Social History     Tobacco Use     Smoking status: Former Smoker     Packs/day: 0.00     Years: 10.00     Pack years: 0.00     Start date: 1980     Last attempt to quit: 2008     Years since quittin.9     Smokeless tobacco: Never Used   Substance Use Topics     Alcohol use: Yes     Alcohol/week: 1.0 - 2.0 standard drinks     Types: 1 - 2 Cans of beer per week     Frequency: Monthly or less     Drinks per session: 1 or 2     Binge frequency: Less than monthly         Current Outpatient Medications   Medication Sig     acetaminophen (TYLENOL) 325 MG tablet Take 2 tablets (650 mg) by mouth every 4 hours as needed for mild pain     alendronate (FOSAMAX) 70 MG tablet Take 1 tablet (70 mg) by mouth every 7 days Take 60 minutes before am meal with 8 oz. water. Remain upright for 30 minutes.     amLODIPine (NORVASC) 5 MG tablet TAKE 1 TABLET BY MOUTH EVERY DAY     aspirin (ASA) 81 MG chewable tablet Take 1 tablet (81 mg) by mouth daily     calcium carbonate 600 mg-vitamin D 400 units (CALTRATE) 600-400 MG-UNIT per tablet Take 1 tablet by mouth 2 times daily (with meals)     cetirizine (ZYRTEC) 10 MG tablet Take 10 mg by mouth daily     DULoxetine (CYMBALTA) 20 MG EC capsule Take 20 mg by mouth daily     levothyroxine (SYNTHROID/LEVOTHROID) 100 MCG tablet MON to SAT 1 tablet/day;SUN 0.5 tablet     melatonin 3 MG tablet Take 2 tablets (6 mg) by mouth At Bedtime (Patient taking differently: Take 5 mg by mouth At Bedtime )      "mesalamine (ASACOL HD) 800 MG EC tablet Take 1 tablet (800 mg) by mouth 3 times daily     multivitamin w/minerals (THERA-VIT-M) tablet Take 1 tablet by mouth daily     mycophenolate (GENERIC EQUIVALENT) 250 MG capsule Take 6 capsules (1,500 mg) by mouth 2 times daily     omeprazole (PRILOSEC) 40 MG DR capsule Take 1 capsule (40 mg) by mouth daily     ondansetron (ZOFRAN-ODT) 4 MG ODT tab DISSOLVE 1 TABLET ON THE TONGUE EVERY EIGHT HOURS AS NEEDED     rosuvastatin (CRESTOR) 10 MG tablet TAKE 1 TABLET BY MOUTH EVERY DAY     senna (SENOKOT) 8.6 MG tablet Take 2 tablets by mouth 2 times daily as needed for constipation     tacrolimus (GENERIC EQUIVALENT) 1 MG capsule Take 3 capsules (3 mg) by mouth 2 times daily     albuterol (PROAIR HFA/PROVENTIL HFA/VENTOLIN HFA) 108 (90 Base) MCG/ACT inhaler Inhale 2 puffs into the lungs every 6 hours as needed for shortness of breath / dyspnea or wheezing     fluticasone (FLOVENT HFA) 220 MCG/ACT Inhaler Inhale 2 puffs into the lungs 2 times daily     magnesium oxide (MAG-OX) 400 MG tablet Take 1 tablet (400 mg) by mouth 2 times daily (Patient not taking: Reported on 12/18/2019)     polyethylene glycol (MIRALAX/GLYCOLAX) powder Take 1 capful by mouth daily     No current facility-administered medications for this visit.          ROS:   10 point ROS negative other than what is discussed above.    EXAM:   /79 (BP Location: Right arm, Patient Position: Chair, Cuff Size: Adult Large)   Pulse 105   Ht 1.727 m (5' 8\")   Wt 68.7 kg (151 lb 8 oz)   SpO2 100%   BMI 23.04 kg/m      GENERAL: Alert, oriented, NAD  HEENT:  NC/AT. Sclerae white.  MMM, no oral lesions  NECK: JVP <6 cm  HEART: RRR, normal S1 and S2, no murmurs. 2+ bilateral peripheral pulses.  LUNGS:  Clear to auscultation bilaterally, no wheezes, rales, or rhonchi  ABDOMEN: Soft, nontender, nondistended, bowel sounds present, no ascites.  EXTREMITIES: No lower extremity edema.    Labs:    CBC RESULTS:  Lab Results "   Component Value Date    WBC 3.2 (L) 12/18/2019    RBC 3.56 (L) 12/18/2019    HGB 9.6 (L) 12/18/2019    HCT 31.0 (L) 12/18/2019    MCV 87 12/18/2019    MCH 27.0 12/18/2019    MCHC 31.0 (L) 12/18/2019    RDW 13.3 12/18/2019     12/18/2019       CMP RESULTS:  Lab Results   Component Value Date     12/18/2019    POTASSIUM 4.5 12/18/2019    CHLORIDE 110 (H) 12/18/2019    CO2 23 12/18/2019    ANIONGAP 7 12/18/2019     (H) 12/18/2019    BUN 27 12/18/2019    CR 1.29 (H) 12/18/2019    GFRESTIMATED 61 12/18/2019    GFRESTBLACK 71 12/18/2019    RADAMES 8.5 12/18/2019    BILITOTAL 0.4 12/18/2019    ALBUMIN 3.8 12/18/2019    ALKPHOS 204 (H) 12/18/2019    ALT 27 12/18/2019    AST 10 12/18/2019        INR RESULTS:  Lab Results   Component Value Date    INR 1.17 (H) 09/23/2019       Lab Results   Component Value Date    MAG 2.0 12/18/2019     Lab Results   Component Value Date    NTBNPI 767 09/23/2019     Lab Results   Component Value Date    NTBNP 10,986 (H) 11/15/2018     ECHO 12/18/2019  Interpretation Summary  Left ventricular function, chamber size, wall motion, and wall thickness are  normal.The EF is 60-65%.  Right ventricular function, chamber size, wall motion, and thickness are  normal.  No significant valvular abnormalities were noted.  Pulmonary artery systolic pressure is normal.  The inferior vena cava was normal in size with preserved respiratory  variability.  No pericardial effusion is present.      RHC and EMB 10/15/19:  Conclusion       Right sided filling pressures are normal.    Left sided filling pressures are normal.    Normal PA pressures.    Normal cardiac output level.    Successful collection of endomyocardial biopsies          Plan       Follow bedrest per protocol    Continued medical management and lifestyle modifications for cardiovascular risk factor optimizations.    Discharge today per protocol   Hemodynamics     Right Heart Pressures     Right sided filling pressures are  normal.Left sided filling pressures are normal. Normal PA pressures.Normal cardiac output level.   Heart Biospy     Heart Biopsy     After informed consent patient was prepped and draped in the usual fashion. Under fluoroscopy guidance a 7 Fr angeled sheath was placed into the right internal jugular vein after local anesthesia with 1.0% lidocaine. 5.5 Korean Jawz bioptome was passed through the sheath to the right ventricular septum and 5 biopsies were obtained. The biopsy specimens were sent to Pathology for examination. The sheath was removed and hemostasis was obtained. The patient tolerated the procedure well and there were no complications.   Pressures Phase: Baseline      Time Systolic Diastolic Mean A Wave V Wave EDP Max dp/dt HR   RA Pressures 11:49 AM   2 mmHg    3 mmHg    4 mmHg      105 bpm      RV Pressures 11:35 AM       1776 mmHg/sec        11:49 AM 22 mmHg        4 mmHg     105 bpm      PA Pressures 11:50 AM 20 mmHg    10 mmHg    14 mmHg        105 bpm      PCW Pressures 11:49 AM   6 mmHg    7 mmHg    7 mmHg      105 bpm      Blood Flow Results Phase: Baseline      Time Results Indexed Values   QP 11:35 AM 5.92 L/min    3.66 L/min/m2      QS 11:35 AM 5.92 L/min    3.66 L/min/m2      Blood Oximetry Phase: Baseline      Time Hb SAT(%) PO2 Content PA Sat   PA 11:35 AM  66.6 %      66.6 %      Art 11:35 AM  100 %     10.74 mL/dL       Cardiac Output Phase: Baseline      Time TDCO TDCI Jaden C.O. Jaden C.I. Jaden HR   Cardiac Output Results 11:35 AM 6.03 L/min    3.74 L/min/m2    5.92 L/min    3.66 L/min/m2        11:51 AM 6.03 L/min          Resistance Results Phase: Baseline      Time PVR SVR PVR-I SVR-I TPR TVR TPR-I TVR-I PVR/SVR TPR/TVR   Resistance Results (Metric) 11:35 .64 dsc-5     172.25 dsc-5/m2     186.62 dsc-5     301.45 dsc-5/m2         Resistance Results (Wood) 11:35 AM 1.33 MARTIN     2.15 MARTIN/m2     2.33 MARTIN     3.77 MARTIN/m2         Stoke Volume Results Phase: Baseline      Time RVSW LVSW  RVSW-I LVSW-I   Stroke Work Results 11:35 AM 9.33 gm*m     5.77 gm*m/m2             TTE 10/15/19:  Interpretation Summary  Left ventricular function, chamber size, wall motion, and wall thickness are  normal.The EF is 60-65%. No regional wall motion abnormalities are seen.  Right ventricular function, chamber size, wall motion, and thickness are  normal.  Trace tricuspid insufficiency is present. Pulmonary artery systolic pressure  is normal. The inferior vena cava was normal in size with preserved  respiratory variability. No pericardial effusion is present.     There has been no change.        Assessment and Plan:   Everton Larios is a 54 year old male with a history of ASD (s/p repair in childhood), AFib/AF (s/p ablation), NICM, diastolic dysfunction, alcohol abuse, Pierce's esophagus, ulcerative colitis, GIB (s/p splenic embolization), and hypothyroidism, now s/p OHT and bypass of persistent left SVC to right atrial appendage 2/24/19 who presents to clinic for routine surveillance. This is his 10 month post-transplant visit.    NICM, s/p OHT and bypass of persistent left SVC to right atrial appendage 2/24/19  His postoperative course was c/b shock due to RV dysfunction requiring IABP support, small pneumoperitoneum (clinically insignificant), chest wall hematoma (former ICD site, s/p evacuation and drain placement 3/31/19), HCAP/klebsiella pneumonia, and FRANKLIN (required short-term HD, now recovered).  He was transferred to  rehab 4/3, and ultimately discharged to his local residence 4/15.     His biopsy 3/25/19 showed mild AMR1 with some staining for c4d.  Repeat biopsy 3/28 showed improved AMR and less reactive capillary staining, and subsequent biopsies have now shown resolution of AMR and improved capillary staining. Baseline coronary angiogram was previously deferred due to renal function.  His last echocardiogram 10/15/19 showed stable graft function with LVEF 60-65%, normal RV function, and trace  TR.     He had a fall in early April, and was found to have a compression fracture.     He was hospitalized 4/28-5/9 with fevers, URI symptoms, abdominal pain, and diarrhea, and was found to be neutropenic with pseudomonas bacteremia and HCAP.  Chest CT showed diffuse bilateral solid with peripheral groundglass pulmonary nodules/opacities and mediastinal adenopathy, BAL grew pseudomonas, and fungitell was positive.  Transplant ID was consulted, and he was treated with 4 weeks of IV cefepime and micafungin.  Repeat CT 5/22 showed significantly decreased bilateral nodular and consolidative opacities.    He was then hospitalized from 9/23-9/28/19 with CMV viremia and C diff colitis. Had diarrhea. 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 Did not have a colonoscopy.     Labs today show stable electrolytes, kidney function, and blood counts. Iron panel did not show iron deficiency anemia. Tacrolimus level 5.5 today (goal 6-8). TTE today showed stable graft function with LVEF 60-65%. He appears euvolemic.      Serostatus:  - CMV D+/R+  - EBV D+/R+  - Toxo D-/R-     Immunosuppression:  - tacrolimus, goal level 6-8.  Tacrolimus level 5.5 yesterday. 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  Continue amlodipine 5mg daily.  - HRs:  Stable, remain >80  - fluid status:  Euvolemic, off diuretics.        PPx:  - CAV:  Aspirin 81mg daily and rosuvastatin 10mg daily  - GERD:  Omeprazole (note h/o carrera's esophagus)  - CMV:  History of CMV viremia. Patient is unable to afford valcyte. Dr. Shukla aware. 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 Stable.  - Will hold off on med changes for now, if worsening could consider decreasing MMF  - F/u CMV from today     Right chest wall hematoma  SVC grafting  Hematoma developed at former ICD site, and he is now s/p evacuation 3/31. SVC graft reportedly patent, so will need to monitor for increased swelling in left upper extremity. Stable today.     Ulcerative Colitis  - Seen by GI 11/26/2019  - Pt needs to reach out to GI to let them know mesalamine will not be covered by his insurance next year  - Discussed in clinic today that he needs to schedule an MRCP and colonoscopy per GI's last note      RTC for 12 month follow-up with coronary angiogram, IVUS, RHC, and EMB. Will also get cMRI.     Patient seen and discussed with Dr. Donis.    Elizabeth Greco PA-C  [facility]       I have seen and examined the patient as part of a shared visit with JOSE Trevino.  I agree with the note above. I reviewed today's vital signs and medications. I have reviewed and discussed with the advanced practice provider their physical examination, assessment, and plan   Briefly, pt s/p OHT presents for ongoing managment  My key history/exam findings are: hemodynamically stable.  Grossly euvolemic on exam   The key management decisions: Echo with preserved ventricular function and allomap within expected results.  Continue current immunosuppression regimen.  Follow-up as per protocol.     Corinna Donis MD  Section Head - Advanced Heart Failure, Transplantation and Mechanical Circulatory Support  Director - Adult Congenital and Cardiovascular Genetics Center  Associate Professor of Medicine, [facility]

## 2019-12-18 NOTE — PATIENT INSTRUCTIONS
Patient Instructions  1. You no longer need weekly labs  2. I will provide a letter for work.   3. Please follow up with GI as recommended.   4. I will call with all pending lab work.     Next transplant clinic appointment:  1st annual in February w/ angio w/ ivus, RHC, hbx, cardiac MRI, cpx, cxr and labs.   Next lab draw:   Coordinator will call with all pending results.     Please call transplant coordinator with any questions:    Jocelynn Jerez RN BSN   Post Heart Transplant Nurse Coordinator  [facility]  Questions: [phone]

## 2019-12-18 NOTE — NURSING NOTE
Transplant Coordinator Note    Reason for visit: 10 month visit  Coordinator: Present     Health concerns addressed today:  1. Stop weekly labs per Dr. Shukla. Follow cmv periodically or per  protocol. (1 month)  2. Saw GI for UC pancolitis    Immunosuppressants:  Mmf 1500 bid  tacro 3 mg bid. Goal 6-8    Routine screenings:    Derm: Due in 2020-awaiting the new year for coverage.   Dental: Due in 2020-awaiting the new year for coverage.   Colonoscopy: Due following with GI  Prostate: due in 2020  Eye: 2019  Flu/Pneumonia: 2019    immuknow 367 last May  No DSAs  Negative toxo  Protein in urine.     Resulted labs and echo reviewed with patient  Medication record reviewed and reconciled  Questions and concerns addressed  Pt verbalized an understanding of plan of care.     Patient Instructions  1. You no longer need weekly labs  2. I will provide a letter for work.   3. Please follow up with GI as recommended.   4. I will call with all pending lab work.     Next transplant clinic appointment:  1st annual in February w/ angio w/ ivus, RHC, hbx, cardiac MRI, cpx, cxr and labs.   Next lab draw:   Coordinator will call with all pending results.     Please call transplant coordinator with any questions:    Jocelynn Jerez, RN BSN   Post Heart Transplant Nurse Coordinator  [facility]  Questions: [phone]

## 2019-12-18 NOTE — NURSING NOTE
Transplant Coordinator Note    Reason for visit: 10 month visit  Coordinator: Present     Health concerns addressed today:  1. Stop weekly labs per Dr. Shukla. Follow cmv periodically or per protocol. (1 month)  2. Saw GI for UC pancolitis  RECOMMENDATIONS:  -- Schedule a MRCP (MRI of your bile ducts)  -- Schedule colonoscopy for surveillance (dysplasia)  -- Continue asacol 800mg PO TID  -- Continue asacol without changes  -- Will message ID about mildly elevated CMV  -- If any fevers, chills, abdominal pain, nausea, vomiting or diarrhea please call or go to the emergency department  -- PCP to review and update immunizations as needed  Return to clinic in 6 weeks?    3.       Immunosuppressants:  Mmf 1500 bid  tacro 3 mg bid. Goal 6-8    Routine screenings:    Derm:   Dental:  Colonoscopy:  Prostate:  Eye:   Flu/Pneumonia:     immuknow 367 last May  No DSAs  Negative toxo  Protein in urine.         Resulted labs and echo reviewed with patient  Medication record reviewed and reconciled  Questions and concerns addressed  Pt verbalized an understanding of plan of care.     Patient Instructions  1.   2.   3.     Next transplant clinic appointment:  1st annual in February w/ angio w/ ivus, RHC, hbx, cardiac MRI, cpx, cxr and labs.   Next lab draw:   Coordinator will call with all pending results.     Please call transplant coordinator with any questions:    Jocelynn Jerez RN BSN   Post Heart Transplant Nurse Coordinator  [facility]  Questions: [phone]

## 2019-12-19 ENCOUNTER — TELEPHONE (OUTPATIENT)
Dept: GASTROENTEROLOGY | Facility: CLINIC | Age: 54
End: 2019-12-19

## 2019-12-19 LAB
CMV DNA SPEC NAA+PROBE-ACNC: 227 [IU]/ML
CMV DNA SPEC NAA+PROBE-LOG#: 2.4 {LOG_IU}/ML
SPECIMEN SOURCE: ABNORMAL

## 2019-12-19 NOTE — TELEPHONE ENCOUNTER
Pt insurance will only cover Balsalazide as of the first of the year. Pt in donut hole now so wants prescription after the first of the year. Pt said he can not schedule his colonoscopy at this time as needs to have his colonoscopy at [facility] [address] with [facility] and does not have anyone to stay with him after or give hm a ride on Tuesday or Thursday.  Will route to Dr. Jensen

## 2019-12-19 NOTE — TELEPHONE ENCOUNTER
[facility] Call Center    Phone Message    May a detailed message be left on voicemail: yes    Reason for Call: Medication Question or concern regarding medication     Name of Medication: mesalamine (ASACOL HD) 800 MG EC tablet   Prescribing Provider: Dr. Jocelynn Jensen    Patient calling in stating he needs to discuss changing mesalamine (ASACOL HD) 800 MG EC tablet to something else as patient's insurance will no longer cover this RX in 2020. Patient states best time to call him is after 3pm today 12/19 or tomorrow 12/20 8-12. Please advise. Thank you, Cait Donis on 12/19/2019 at 9:54 AM              Action Taken: Message routed to:  [facility] ([facility] GI

## 2019-12-19 NOTE — RESULT ENCOUNTER NOTE
tacro level 5.5. Goal 6-8. Cr 1.2. 11 hour trough. Current dose 3 mg bid. No changes at this time. Recheck in 2 weeks when we recheck CMV. Pt verbalized understanding of next steps.

## 2019-12-20 LAB
ALLOMAP SCORE (EXTERNAL): 35
NEGATIVE PREDICTIVE VALUE PERCENT (EXTERNAL): 98.7 %
POSITIVE PREDICTIVE VALUE PERCENT (EXTERNAL): 4 %

## 2019-12-20 NOTE — PROGRESS NOTES
[facility]       HPI:  Everton Larios is a 54 year old male with a history of ASD (s/p repair in childhood), AFib/AF (s/p ablation), NICM, diastolic dysfunction, alcohol abuse, Pierce's esophagus, ulcerative colitis, GIB (s/p splenic embolization), and hypothyroidism, now s/p OHT and bypass of persistent left SVC to right atrial appendage 2/24/19 who presents to clinic for routine surveillance. This is his 10 month post-transplant visit.    His postoperative course was c/b shock due to RV dysfunction requiring IABP support, small pneumoperitoneum (clinically insignificant), chest wall hematoma (former ICD site, s/p evacuation and drain placement 3/31/19), HCAP/klebsiella pneumonia, and FRANKLIN (required short-term HD, now recovered).      His biopsy 3/25/19 showed mild AMR1 with some staining for c4d. Repeat biopsy 3/28 showed improved AMR and less reactive capillary staining, and subsequent biopsies have now shown resolution of AMR and improved capillary staining.  Baseline coronary angiogram was previously deferred due to renal function.  His last echocardiogram 12/18/19 showed stable graft function with LVEF 60-65%, normal RV function.     He had a fall in early April, and was found to have a compression fracture. Back pain is improving today.     He was hospitalized 4/28-5/9 with fevers, URI symptoms, abdominal pain, and diarrhea, and was found to be neutropenic with pseudomonas bacteremia and HCAP.  Chest CT showed diffuse bilateral solid with peripheral groundglass pulmonary nodules/opacities and mediastinal adenopathy, BAL grew pseudomonas, and fungitell was positive.  Transplant ID was consulted, and he was treated with 4 weeks of IV cefepime and micafungin.  Repeat CT 5/22 showed significantly decreased bilateral nodular and consolidative opacities.    He was then hospitalized from 9/23-9/28/19 with CMV viremia and C diff colitis. Had diarrhea. 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 Did not have a colonoscopy.    Since his last clinic visit, he has been feeling well overall. He has had some ongoing back pain, left elbow pain and left knee pain, all chronic for him. These symptoms are been exacerbated by extended standing as a  and repetitive movements.     For the last month he reports a very mild cough and sinus congestion which he attributes to the dry air. He does not have any fever/chills, sore throat, swollen lymph nodes, productive cough, or any other complaints. He denies abdominal pain, N/V/D. All diarrhea that he had had in the past has resolved. He denies any edema, orthopnea, or PND.    Past Medical History:   Diagnosis Date     Alcohol abuse      Pierce's esophagus 10/4/2018     Chronic rhinitis 10/4/2018     Chronic systolic heart failure (H) 10/4/2018     Depression 10/4/2018     Diastolic dysfunction      DJD (degenerative joint disease) - neck 10/4/2018     H/O congenital atrial septal defect (ASD) repair at age 3 10/4/2018     Hypothyroidism due to medication 10/4/2018     ICD, [employer] 2008; gen change 2/2018 10/4/2018     Intermittent asthma without complication 10/4/2018     Nonischemic cardiomyopathy (H) 10/4/2018     On amiodarone therapy 10/4/2018     Paroxysmal atrial fibrillation (H) 10/4/2018     S/P ablation of atrial fibrillation 10/4/2018     Systolic heart failure (H)      Ulcerative colitis (H) 2005     Ventricular tachycardia (H) 10/4/2018       Past Surgical History:   Procedure Laterality Date     AICD, DUAL CHAMBER       BRONCHOSCOPY (RIGID OR FLEXIBLE), DIAGNOSTIC N/A 4/30/2019    Procedure: BRONCHOSCOPY, WITH BAL;  Surgeon: Margot Chiang MD;  Location:  GI     CV HEART BIOPSY N/A 3/4/2019    Procedure: Heart Biopsy;  Surgeon: Abhijeet Titus MD;  Location:  HEART CARDIAC CATH LAB     CV HEART BIOPSY N/A 3/25/2019    Procedure: Heart Biopsy;  Surgeon: Yves Rodrigues MD;  Location:  HEART CARDIAC  CATH LAB     CV HEART BIOPSY N/A 3/28/2019    Procedure: Heart Cath Heart Biopsy;  Surgeon: Yves Rodrigues MD;  [facility]     CV HEART BIOPSY N/A 4/10/2019    Procedure: CV HEART BIOPSY;  Surgeon: Isiah Mcallister MD;  Location: [facility]     CV HEART BIOPSY N/A 4/24/2019    Procedure: CV HEART BIOPSY;  Surgeon: Isiah Mcallister MD;  [facility]     CV HEART BIOPSY N/A 5/8/2019    Procedure: CV HEART BIOPSY;  Surgeon: Isiah Mcallister MD;  [facility]     CV HEART BIOPSY N/A 6/4/2019    Procedure: CV HEART BIOPSY;  Surgeon: Alton Solomon MD;  Location:  HEART CARDIAC CATH LAB     CV HEART BIOPSY N/A 5/22/2019    Procedure: CV HEART BIOPSY;  Surgeon: Yves Rodrigues MD;  Location:  HEART CARDIAC CATH LAB     CV HEART BIOPSY N/A 7/17/2019    Procedure: CV HEART BIOPSY;  Surgeon: Isiah Mcallister MD;  Location:  HEART CARDIAC CATH LAB     CV HEART BIOPSY N/A 8/13/2019    Procedure: CV HEART BIOPSY;  Surgeon: Jackson Patten MD;  Location:  HEART CARDIAC CATH LAB     CV HEART BIOPSY N/A 10/15/2019    Procedure: CV HEART BIOPSY;  Surgeon: Santino Mckeon MD;  Location:  HEART CARDIAC CATH LAB     CV INTRA-AORTIC BALLOON PUMP INSERTION N/A 2/18/2019    Procedure: Intra-Aortic Balloon;  Surgeon: Alton Solomon MD;  Location:  HEART CARDIAC CATH LAB     CV INTRA-AORTIC BALLOON PUMP INSERTION N/A 2/20/2019    Procedure: Replace subclavian IABP;  Surgeon: Yves Rodrigues MD;  Location:  HEART CARDIAC CATH LAB     CV LEFT HEART CATH N/A 3/19/2019    Procedure: Left Heart Cath;  Surgeon: Yves Rodrigues MD;  Location:  HEART CARDIAC CATH LAB     CV RIGHT HEART CATH N/A 3/19/2019    Procedure: RHC/HBx - Femoral access for 3/19 per Nimisha GONZALEZ;  Surgeon: Yves Rodrigues MD;  Location:  HEART CARDIAC CATH LAB     CV RIGHT HEART CATH N/A 3/25/2019    Procedure:  Right Heart Cath;  Surgeon: Yves Rodrigues MD;  Location:  HEART CARDIAC CATH LAB     CV RIGHT HEART CATH N/A 3/28/2019    Procedure: Heart Cath Right Heart Cath;  Surgeon: Yves Rodrigues MD;  Location:  HEART CARDIAC CATH LAB     CV RIGHT HEART CATH N/A 4/24/2019    Procedure: CV RIGHT HEART CATH;  Surgeon: Isiah Mcallister MD;  Location:  HEART CARDIAC CATH LAB     CV RIGHT HEART CATH N/A 3/11/2019    Procedure: ADD ON RHC/HBX;  Surgeon: Alton Solomon MD;  Location:  HEART CARDIAC CATH LAB     CV RIGHT HEART CATH N/A 6/4/2019    Procedure: CV RIGHT HEART CATH;  Surgeon: Alton Solomon MD;  Location:  HEART CARDIAC CATH LAB     CV RIGHT HEART CATH N/A 5/22/2019    Procedure: CV RIGHT HEART CATH;  Surgeon: Yves Rodrigues MD;  Location:  HEART CARDIAC CATH LAB     CV RIGHT HEART CATH N/A 8/13/2019    Procedure: CV RIGHT HEART CATH;  Surgeon: Jackson Patten MD;  Location:  HEART CARDIAC CATH LAB     CV RIGHT HEART CATH N/A 10/15/2019    Procedure: CV RIGHT HEART CATH;  Surgeon: Santino Mckeon MD;  Location:  HEART CARDIAC CATH LAB     INCISION AND DRAINAGE CHEST WASHOUT, COMBINED N/A 3/21/2019    Procedure: Incision And Drainage; Evacuation Right Chest Wall Hematoma;  Surgeon: Dewayne House MD;  [facility]     INSERT INTRAAORTIC BALLOON PUMP Left 2/19/2019    Procedure: Insert left  Subclavian Balloon Pump,  Removal Right femoral arterial balloom pump sheath;  Surgeon: Dewayne House MD;  [facility]     INSERT INTRAAORTIC BALLOON PUMP Left 2/21/2019    Procedure: SUBCLAVIAN BALLOON PUMP PLACEMENT;  Surgeon: Ben White MD;  [facility]     IR PICC PLACEMENT > 5 YRS OF AGE  5/8/2019     TRANSPLANT HEART RECIPIENT N/A 2/24/2019    Procedure: TRANSPLANT HEART RECIPIENT;  Surgeon: Dewayne House MD;  Location: [facility]       Family History   Problem Relation Age of Onset     Endometrial Cancer Mother       Osteoporosis Mother      Hypertension Father      Endometrial Cancer Maternal Grandmother      Hip fracture No family hx of      Nephrolithiasis No family hx of        Social History     Tobacco Use     Smoking status: Former Smoker     Packs/day: 0.00     Years: 10.00     Pack years: 0.00     Start date: 1980     Last attempt to quit: 2008     Years since quittin.9     Smokeless tobacco: Never Used   Substance Use Topics     Alcohol use: Yes     Alcohol/week: 1.0 - 2.0 standard drinks     Types: 1 - 2 Cans of beer per week     Frequency: Monthly or less     Drinks per session: 1 or 2     Binge frequency: Less than monthly         Current Outpatient Medications   Medication Sig     acetaminophen (TYLENOL) 325 MG tablet Take 2 tablets (650 mg) by mouth every 4 hours as needed for mild pain     alendronate (FOSAMAX) 70 MG tablet Take 1 tablet (70 mg) by mouth every 7 days Take 60 minutes before am meal with 8 oz. water. Remain upright for 30 minutes.     amLODIPine (NORVASC) 5 MG tablet TAKE 1 TABLET BY MOUTH EVERY DAY     aspirin (ASA) 81 MG chewable tablet Take 1 tablet (81 mg) by mouth daily     calcium carbonate 600 mg-vitamin D 400 units (CALTRATE) 600-400 MG-UNIT per tablet Take 1 tablet by mouth 2 times daily (with meals)     cetirizine (ZYRTEC) 10 MG tablet Take 10 mg by mouth daily     DULoxetine (CYMBALTA) 20 MG EC capsule Take 20 mg by mouth daily     levothyroxine (SYNTHROID/LEVOTHROID) 100 MCG tablet MON to SAT 1 tablet/day;SUN 0.5 tablet     melatonin 3 MG tablet Take 2 tablets (6 mg) by mouth At Bedtime (Patient taking differently: Take 5 mg by mouth At Bedtime )     mesalamine (ASACOL HD) 800 MG EC tablet Take 1 tablet (800 mg) by mouth 3 times daily     multivitamin w/minerals (THERA-VIT-M) tablet Take 1 tablet by mouth daily     mycophenolate (GENERIC EQUIVALENT) 250 MG capsule Take 6 capsules (1,500 mg) by mouth 2 times daily     omeprazole (PRILOSEC) 40 MG DR capsule Take 1 capsule (40  "mg) by mouth daily     ondansetron (ZOFRAN-ODT) 4 MG ODT tab DISSOLVE 1 TABLET ON THE TONGUE EVERY EIGHT HOURS AS NEEDED     rosuvastatin (CRESTOR) 10 MG tablet TAKE 1 TABLET BY MOUTH EVERY DAY     senna (SENOKOT) 8.6 MG tablet Take 2 tablets by mouth 2 times daily as needed for constipation     tacrolimus (GENERIC EQUIVALENT) 1 MG capsule Take 3 capsules (3 mg) by mouth 2 times daily     albuterol (PROAIR HFA/PROVENTIL HFA/VENTOLIN HFA) 108 (90 Base) MCG/ACT inhaler Inhale 2 puffs into the lungs every 6 hours as needed for shortness of breath / dyspnea or wheezing     fluticasone (FLOVENT HFA) 220 MCG/ACT Inhaler Inhale 2 puffs into the lungs 2 times daily     magnesium oxide (MAG-OX) 400 MG tablet Take 1 tablet (400 mg) by mouth 2 times daily (Patient not taking: Reported on 12/18/2019)     polyethylene glycol (MIRALAX/GLYCOLAX) powder Take 1 capful by mouth daily     No current facility-administered medications for this visit.          ROS:   10 point ROS negative other than what is discussed above.    EXAM:   /79 (BP Location: Right arm, Patient Position: Chair, Cuff Size: Adult Large)   Pulse 105   Ht 1.727 m (5' 8\")   Wt 68.7 kg (151 lb 8 oz)   SpO2 100%   BMI 23.04 kg/m     GENERAL: Alert, oriented, NAD  HEENT:  NC/AT. Sclerae white.  MMM, no oral lesions  NECK: JVP <6 cm  HEART: RRR, normal S1 and S2, no murmurs. 2+ bilateral peripheral pulses.  LUNGS:  Clear to auscultation bilaterally, no wheezes, rales, or rhonchi  ABDOMEN: Soft, nontender, nondistended, bowel sounds present, no ascites.  EXTREMITIES: No lower extremity edema.    Labs:    CBC RESULTS:  Lab Results   Component Value Date    WBC 3.2 (L) 12/18/2019    RBC 3.56 (L) 12/18/2019    HGB 9.6 (L) 12/18/2019    HCT 31.0 (L) 12/18/2019    MCV 87 12/18/2019    MCH 27.0 12/18/2019    MCHC 31.0 (L) 12/18/2019    RDW 13.3 12/18/2019     12/18/2019       CMP RESULTS:  Lab Results   Component Value Date     12/18/2019    POTASSIUM " 4.5 12/18/2019    CHLORIDE 110 (H) 12/18/2019    CO2 23 12/18/2019    ANIONGAP 7 12/18/2019     (H) 12/18/2019    BUN 27 12/18/2019    CR 1.29 (H) 12/18/2019    GFRESTIMATED 61 12/18/2019    GFRESTBLACK 71 12/18/2019    RADAMES 8.5 12/18/2019    BILITOTAL 0.4 12/18/2019    ALBUMIN 3.8 12/18/2019    ALKPHOS 204 (H) 12/18/2019    ALT 27 12/18/2019    AST 10 12/18/2019        INR RESULTS:  Lab Results   Component Value Date    INR 1.17 (H) 09/23/2019       Lab Results   Component Value Date    MAG 2.0 12/18/2019     Lab Results   Component Value Date    NTBNPI 767 09/23/2019     Lab Results   Component Value Date    NTBNP 10,986 (H) 11/15/2018     ECHO 12/18/2019  Interpretation Summary  Left ventricular function, chamber size, wall motion, and wall thickness are  normal.The EF is 60-65%.  Right ventricular function, chamber size, wall motion, and thickness are  normal.  No significant valvular abnormalities were noted.  Pulmonary artery systolic pressure is normal.  The inferior vena cava was normal in size with preserved respiratory  variability.  No pericardial effusion is present.      RHC and EMB 10/15/19:  Conclusion       Right sided filling pressures are normal.    Left sided filling pressures are normal.    Normal PA pressures.    Normal cardiac output level.    Successful collection of endomyocardial biopsies          Plan       Follow bedrest per protocol    Continued medical management and lifestyle modifications for cardiovascular risk factor optimizations.    Discharge today per protocol   Hemodynamics     Right Heart Pressures     Right sided filling pressures are normal.Left sided filling pressures are normal. Normal PA pressures.Normal cardiac output level.   Heart Biospy     Heart Biopsy     After informed consent patient was prepped and draped in the usual fashion. Under fluoroscopy guidance a 7 Fr angeled sheath was placed into the right internal jugular vein after local anesthesia with 1.0%  lidocaine. 5.5 [place] Jawz bioptome was passed through the sheath to the right ventricular septum and 5 biopsies were obtained. The biopsy specimens were sent to Pathology for examination. The sheath was removed and hemostasis was obtained. The patient tolerated the procedure well and there were no complications.   Pressures Phase: Baseline      Time Systolic Diastolic Mean A Wave V Wave EDP Max dp/dt HR   RA Pressures 11:49 AM   2 mmHg    3 mmHg    4 mmHg      105 bpm      RV Pressures 11:35 AM       1776 mmHg/sec        11:49 AM 22 mmHg        4 mmHg     105 bpm      PA Pressures 11:50 AM 20 mmHg    10 mmHg    14 mmHg        105 bpm      PCW Pressures 11:49 AM   6 mmHg    7 mmHg    7 mmHg      105 bpm      Blood Flow Results Phase: Baseline      Time Results Indexed Values   QP 11:35 AM 5.92 L/min    3.66 L/min/m2      QS 11:35 AM 5.92 L/min    3.66 L/min/m2      Blood Oximetry Phase: Baseline      Time Hb SAT(%) PO2 Content PA Sat   PA 11:35 AM  66.6 %      66.6 %      Art 11:35 AM  100 %     10.74 mL/dL       Cardiac Output Phase: Baseline      Time TDCO TDCI Jaden C.O. Jaden C.I. Jaden HR   Cardiac Output Results 11:35 AM 6.03 L/min    3.74 L/min/m2    5.92 L/min    3.66 L/min/m2        11:51 AM 6.03 L/min          Resistance Results Phase: Baseline      Time PVR SVR PVR-I SVR-I TPR TVR TPR-I TVR-I PVR/SVR TPR/TVR   Resistance Results (Metric) 11:35 .64 dsc-5     172.25 dsc-5/m2     186.62 dsc-5     301.45 dsc-5/m2         Resistance Results (Wood) 11:35 AM 1.33 MARTIN     2.15 MARTIN/m2     2.33 MARTIN     3.77 MARTIN/m2         Stoke Volume Results Phase: Baseline      Time RVSW LVSW RVSW-I LVSW-I   Stroke Work Results 11:35 AM 9.33 gm*m     5.77 gm*m/m2             TTE 10/15/19:  Interpretation Summary  Left ventricular function, chamber size, wall motion, and wall thickness are  normal.The EF is 60-65%. No regional wall motion abnormalities are seen.  Right ventricular function, chamber size, wall motion, and thickness  are  normal.  Trace tricuspid insufficiency is present. Pulmonary artery systolic pressure  is normal. The inferior vena cava was normal in size with preserved  respiratory variability. No pericardial effusion is present.     There has been no change.        Assessment and Plan:   Everton Larios is a 54 year old male with a history of ASD (s/p repair in childhood), AFib/AF (s/p ablation), NICM, diastolic dysfunction, alcohol abuse, Pierce's esophagus, ulcerative colitis, GIB (s/p splenic embolization), and hypothyroidism, now s/p OHT and bypass of persistent left SVC to right atrial appendage 2/24/19 who presents to clinic for routine surveillance. This is his 10 month post-transplant visit.    NICM, s/p OHT and bypass of persistent left SVC to right atrial appendage 2/24/19  His postoperative course was c/b shock due to RV dysfunction requiring IABP support, small pneumoperitoneum (clinically insignificant), chest wall hematoma (former ICD site, s/p evacuation and drain placement 3/31/19), HCAP/klebsiella pneumonia, and FRANKLIN (required short-term HD, now recovered).  He was transferred to  rehab 4/3, and ultimately discharged to his local residence 4/15.     His biopsy 3/25/19 showed mild AMR1 with some staining for c4d.  Repeat biopsy 3/28 showed improved AMR and less reactive capillary staining, and subsequent biopsies have now shown resolution of AMR and improved capillary staining. Baseline coronary angiogram was previously deferred due to renal function.  His last echocardiogram 10/15/19 showed stable graft function with LVEF 60-65%, normal RV function, and trace TR.     He had a fall in early April, and was found to have a compression fracture.     He was hospitalized 4/28-5/9 with fevers, URI symptoms, abdominal pain, and diarrhea, and was found to be neutropenic with pseudomonas bacteremia and HCAP.  Chest CT showed diffuse bilateral solid with peripheral groundglass pulmonary nodules/opacities and  mediastinal adenopathy, BAL grew pseudomonas, and fungitell was positive.  Transplant ID was consulted, and he was treated with 4 weeks of IV cefepime and micafungin.  Repeat CT 5/22 showed significantly decreased bilateral nodular and consolidative opacities.    He was then hospitalized from 9/23-9/28/19 with CMV viremia and C diff colitis. Had diarrhea. 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 Did not have a colonoscopy.     Labs today show stable electrolytes, kidney function, and blood counts. Iron panel did not show iron deficiency anemia. Tacrolimus level 5.5 today (goal 6-8). TTE today showed stable graft function with LVEF 60-65%. He appears euvolemic.      Serostatus:  - CMV D+/R+  - EBV D+/R+  - Toxo D-/R-     Immunosuppression:  - tacrolimus, goal level 6-8.  Tacrolimus level 5.5 yesterday. 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  Continue amlodipine 5mg daily.  - HRs:  Stable, remain >80  - fluid status:  Euvolemic, off diuretics.        PPx:  - CAV:  Aspirin 81mg daily and rosuvastatin 10mg daily  - GERD:  Omeprazole (note h/o carrera's esophagus)  - CMV:  History of CMV viremia. Patient is unable to afford valcyte. Dr. Shukla aware. 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 Stable.  - Will hold off on med changes for now, if worsening could consider decreasing MMF  - F/u CMV from today     Right chest wall hematoma  SVC grafting  Hematoma developed at former ICD site, and he is now s/p evacuation 3/31. SVC graft reportedly patent, so will need to monitor for increased swelling in left upper  extremity. Stable today.     Ulcerative Colitis  - Seen by GI 11/26/2019  - Pt needs to reach out to GI to let them know mesalamine will not be covered by his insurance next year  - Discussed in clinic today that he needs to schedule an MRCP and colonoscopy per GI's last note      RTC for 12 month follow-up with coronary angiogram, IVUS, RHC, and EMB. Will also get cMRI.     Patient seen and discussed with Dr. Donis.    Elizabeth Greco PA-C  [facility]       I have seen and examined the patient as part of a shared visit with JOSE Trevino.  I agree with the note above. I reviewed today's vital signs and medications. I have reviewed and discussed with the advanced practice provider their physical examination, assessment, and plan   Briefly, pt s/p OHT presents for ongoing managment  My key history/exam findings are: hemodynamically stable.  Grossly euvolemic on exam   The key management decisions: Echo with preserved ventricular function and allomap within expected results.  Continue current immunosuppression regimen.  Follow-up as per protocol.     Corinna Donis MD  Section Head - Advanced Heart Failure, Transplantation and Mechanical Circulatory Support  Director - Adult Congenital [facility]ovascular [facility]  Associate Professor of Medicine, [facility]

## 2019-12-30 ENCOUNTER — OFFICE VISIT - HEALTHEAST (OUTPATIENT)
Dept: INTERNAL MEDICINE | Facility: CLINIC | Age: 54
End: 2019-12-30

## 2019-12-30 DIAGNOSIS — B02.9 HERPES ZOSTER WITHOUT COMPLICATION: ICD-10-CM

## 2019-12-31 ENCOUNTER — TELEPHONE (OUTPATIENT)
Dept: TRANSPLANT | Facility: CLINIC | Age: 54
End: 2019-12-31

## 2019-12-31 ENCOUNTER — PATIENT OUTREACH (OUTPATIENT)
Dept: GASTROENTEROLOGY | Facility: CLINIC | Age: 54
End: 2019-12-31

## 2019-12-31 DIAGNOSIS — K51.00 ULCERATIVE PANCOLITIS (H): Primary | ICD-10-CM

## 2019-12-31 RX ORDER — BALSALAZIDE DISODIUM 750 MG/1
CAPSULE ORAL
Qty: 270 CAPSULE | Refills: 3 | Status: SHIPPED | OUTPATIENT
Start: 2019-12-31 | End: 2020-05-11

## 2019-12-31 NOTE — PROGRESS NOTES
Balsalazide ok if that is all that's covered    Previous Messages            Call Back (mesalamine (ASACOL HD) 800 MG EC tablet)     Hermelindo Markham MD  You; Jocelynn Jensen MD 1 hour ago (3:11 PM)      Balsalazide ok if that is all that's cov

## 2019-12-31 NOTE — TELEPHONE ENCOUNTER
Patient Call: Transplant Illness    Duration of illness: 12/30/19 Diagnosed with Shingles and started taking Valcyte  Transplanted organ? heart  Illness: Other Shingles

## 2019-12-31 NOTE — TELEPHONE ENCOUNTER
Pt was diagnosed with shingles. Prescribed valcyte 1 gram by primary MD. Pt never had shingles vaccine due to costs. Pt is scheduled to have labs (repeat cmv and bmp) in mid January.

## 2020-01-02 ENCOUNTER — PATIENT OUTREACH (OUTPATIENT)
Dept: GASTROENTEROLOGY | Facility: CLINIC | Age: 55
End: 2020-01-02

## 2020-01-02 NOTE — PROGRESS NOTES
Called pt and he has found a ride. Pt has the number to call to schedule.     As far as his colonoscopy, has he found anyone that could give him a ride on a Tuesday or a Thursday?

## 2020-01-13 DIAGNOSIS — I10 BENIGN ESSENTIAL HYPERTENSION: ICD-10-CM

## 2020-01-14 RX ORDER — AMLODIPINE BESYLATE 5 MG/1
TABLET ORAL
Qty: 90 TABLET | Refills: 0 | Status: SHIPPED | OUTPATIENT
Start: 2020-01-14 | End: 2020-05-11

## 2020-01-16 DIAGNOSIS — Z94.1 TRANSPLANTED HEART (H): ICD-10-CM

## 2020-01-16 DIAGNOSIS — R79.89 LOW TSH LEVEL: ICD-10-CM

## 2020-01-16 DIAGNOSIS — E03.2 HYPOTHYROIDISM DUE TO MEDICATION: Chronic | ICD-10-CM

## 2020-01-16 DIAGNOSIS — B25.9 CYTOMEGALOVIRUS (CMV) VIREMIA (H): ICD-10-CM

## 2020-01-16 LAB
ERYTHROCYTE [DISTWIDTH] IN BLOOD BY AUTOMATED COUNT: 13.9 % (ref 10–15)
HCT VFR BLD AUTO: 32.1 % (ref 40–53)
HGB BLD-MCNC: 10.2 G/DL (ref 13.3–17.7)
MCH RBC QN AUTO: 26.4 PG (ref 26.5–33)
MCHC RBC AUTO-ENTMCNC: 31.8 G/DL (ref 31.5–36.5)
MCV RBC AUTO: 83 FL (ref 78–100)
PLATELET # BLD AUTO: 276 10E9/L (ref 150–450)
RBC # BLD AUTO: 3.86 10E12/L (ref 4.4–5.9)
T3 SERPL-MCNC: 102 NG/DL (ref 60–181)
TACROLIMUS BLD-MCNC: 4.3 UG/L (ref 5–15)
TME LAST DOSE: ABNORMAL H
WBC # BLD AUTO: 3.3 10E9/L (ref 4–11)

## 2020-01-16 PROCEDURE — 84439 ASSAY OF FREE THYROXINE: CPT

## 2020-01-16 PROCEDURE — 84480 ASSAY TRIIODOTHYRONINE (T3): CPT

## 2020-01-16 PROCEDURE — 85027 COMPLETE CBC AUTOMATED: CPT

## 2020-01-16 PROCEDURE — 84443 ASSAY THYROID STIM HORMONE: CPT

## 2020-01-16 PROCEDURE — 80197 ASSAY OF TACROLIMUS: CPT | Performed by: INTERNAL MEDICINE

## 2020-01-16 PROCEDURE — 36415 COLL VENOUS BLD VENIPUNCTURE: CPT

## 2020-01-16 PROCEDURE — 80053 COMPREHEN METABOLIC PANEL: CPT

## 2020-01-17 LAB
ALBUMIN SERPL-MCNC: 3.7 G/DL (ref 3.4–5)
ALP SERPL-CCNC: 194 U/L (ref 40–150)
ALT SERPL W P-5'-P-CCNC: 20 U/L (ref 0–70)
ANION GAP SERPL CALCULATED.3IONS-SCNC: 10 MMOL/L (ref 3–14)
AST SERPL W P-5'-P-CCNC: 17 U/L (ref 0–45)
BILIRUB SERPL-MCNC: 0.3 MG/DL (ref 0.2–1.3)
BUN SERPL-MCNC: 23 MG/DL (ref 7–30)
CALCIUM SERPL-MCNC: 8.6 MG/DL (ref 8.5–10.1)
CHLORIDE SERPL-SCNC: 109 MMOL/L (ref 94–109)
CMV DNA SPEC NAA+PROBE-ACNC: <137 [IU]/ML
CMV DNA SPEC NAA+PROBE-LOG#: <2.1 {LOG_IU}/ML
CO2 SERPL-SCNC: 21 MMOL/L (ref 20–32)
CREAT SERPL-MCNC: 1.11 MG/DL (ref 0.66–1.25)
GFR SERPL CREATININE-BSD FRML MDRD: 73 ML/MIN/{1.73_M2}
GLUCOSE SERPL-MCNC: 99 MG/DL (ref 70–99)
POTASSIUM SERPL-SCNC: 4.7 MMOL/L (ref 3.4–5.3)
PROT SERPL-MCNC: 6.8 G/DL (ref 6.8–8.8)
SODIUM SERPL-SCNC: 140 MMOL/L (ref 133–144)
SPECIMEN SOURCE: ABNORMAL
T4 FREE SERPL-MCNC: 0.86 NG/DL (ref 0.76–1.46)
TSH SERPL DL<=0.005 MIU/L-ACNC: 0.4 MU/L (ref 0.4–4)

## 2020-01-22 ENCOUNTER — TELEPHONE (OUTPATIENT)
Dept: TRANSPLANT | Facility: CLINIC | Age: 55
End: 2020-01-22

## 2020-01-22 DIAGNOSIS — Z94.1 HEART REPLACED BY TRANSPLANT (H): ICD-10-CM

## 2020-01-22 RX ORDER — TACROLIMUS 1 MG/1
3 CAPSULE ORAL 2 TIMES DAILY
Qty: 180 CAPSULE | Refills: 11 | Status: SHIPPED | OUTPATIENT
Start: 2020-01-22 | End: 2020-12-02

## 2020-01-22 RX ORDER — TACROLIMUS 0.5 MG/1
CAPSULE ORAL
Qty: 90 CAPSULE | Refills: 11 | Status: SHIPPED | OUTPATIENT
Start: 2020-01-22 | End: 2020-09-18

## 2020-01-22 NOTE — TELEPHONE ENCOUNTER
Patient Call: Transplant Lab/Orders    Reason for Call: Discuss lab results; which results? Was returning care coodinators call regarding lab results (from 01/17/2020)  Callback needed? Yes    Return Call Needed  Same as documented in contacts section  When to return call?: Same day: [facility]

## 2020-01-22 NOTE — TELEPHONE ENCOUNTER
tacro level 4.3. Good 12 hour trough. Goal 6-8. Cr 1.1. Current dose 3 mg bid. Instructions given to increase to 3.5/3. repeat a level in 1 week.     Pt states shingles is resolving. occasional tingling and pain. Off valcyte, was only on one week. Pt encouraged to call if symptoms persist.     CMV <137. No questions or concerns.

## 2020-01-30 ENCOUNTER — PRE VISIT (OUTPATIENT)
Dept: TRANSPLANT | Facility: CLINIC | Age: 55
End: 2020-01-30

## 2020-01-30 DIAGNOSIS — Z13.29 THYROID DISORDER SCREENING: ICD-10-CM

## 2020-01-30 DIAGNOSIS — Z12.5 PROSTATE CANCER SCREENING: ICD-10-CM

## 2020-01-30 DIAGNOSIS — E11.9 DIABETES MELLITUS (H): ICD-10-CM

## 2020-01-30 DIAGNOSIS — Z94.1 TRANSPLANTED HEART (H): ICD-10-CM

## 2020-01-30 DIAGNOSIS — Z13.220 LIPID SCREENING: Primary | ICD-10-CM

## 2020-02-04 ENCOUNTER — OFFICE VISIT - HEALTHEAST (OUTPATIENT)
Dept: INTERNAL MEDICINE | Facility: CLINIC | Age: 55
End: 2020-02-04

## 2020-02-04 DIAGNOSIS — Z94.1 HEART REPLACED BY TRANSPLANT (H): ICD-10-CM

## 2020-02-04 DIAGNOSIS — K51.90 ULCERATIVE COLITIS WITHOUT COMPLICATIONS, UNSPECIFIED LOCATION (H): ICD-10-CM

## 2020-02-04 DIAGNOSIS — A08.39 COLITIS, CYTOMEGALOVIRUS (H): ICD-10-CM

## 2020-02-04 DIAGNOSIS — J45.20 MILD INTERMITTENT ASTHMA WITHOUT COMPLICATION: ICD-10-CM

## 2020-02-04 DIAGNOSIS — B25.9 COLITIS, CYTOMEGALOVIRUS (H): ICD-10-CM

## 2020-02-04 DIAGNOSIS — Z01.818 PREOPERATIVE EXAMINATION: ICD-10-CM

## 2020-02-04 DIAGNOSIS — E03.9 HYPOTHYROIDISM, UNSPECIFIED TYPE: ICD-10-CM

## 2020-02-04 ASSESSMENT — MIFFLIN-ST. JEOR: SCORE: 1503.13

## 2020-02-08 ENCOUNTER — HEALTH MAINTENANCE LETTER (OUTPATIENT)
Age: 55
End: 2020-02-08

## 2020-02-10 DIAGNOSIS — Z94.1 TRANSPLANTED HEART (H): Primary | ICD-10-CM

## 2020-02-10 RX ORDER — LIDOCAINE 40 MG/G
CREAM TOPICAL
Status: CANCELLED | OUTPATIENT
Start: 2020-02-10

## 2020-02-12 ENCOUNTER — APPOINTMENT (OUTPATIENT)
Dept: LAB | Facility: CLINIC | Age: 55
End: 2020-02-12
Attending: INTERNAL MEDICINE
Payer: COMMERCIAL

## 2020-02-12 ENCOUNTER — HOSPITAL ENCOUNTER (OUTPATIENT)
Facility: CLINIC | Age: 55
Discharge: HOME | End: 2020-02-12
Attending: INTERNAL MEDICINE | Admitting: INTERNAL MEDICINE
Payer: COMMERCIAL

## 2020-02-12 ENCOUNTER — OFFICE VISIT (OUTPATIENT)
Dept: CARDIOLOGY | Facility: CLINIC | Age: 55
End: 2020-02-12
Attending: INTERNAL MEDICINE
Payer: COMMERCIAL

## 2020-02-12 ENCOUNTER — HOSPITAL ENCOUNTER (OUTPATIENT)
Dept: GENERAL RADIOLOGY | Facility: CLINIC | Age: 55
End: 2020-02-12
Attending: INTERNAL MEDICINE
Payer: COMMERCIAL

## 2020-02-12 ENCOUNTER — RESULTS ONLY (OUTPATIENT)
Dept: OTHER | Facility: CLINIC | Age: 55
End: 2020-02-12

## 2020-02-12 ENCOUNTER — APPOINTMENT (OUTPATIENT)
Dept: MEDSURG UNIT | Facility: CLINIC | Age: 55
End: 2020-02-12
Attending: INTERNAL MEDICINE
Payer: COMMERCIAL

## 2020-02-12 VITALS
OXYGEN SATURATION: 99 % | HEART RATE: 104 BPM | DIASTOLIC BLOOD PRESSURE: 83 MMHG | HEIGHT: 68 IN | BODY MASS INDEX: 24.4 KG/M2 | SYSTOLIC BLOOD PRESSURE: 120 MMHG | WEIGHT: 161 LBS

## 2020-02-12 VITALS
DIASTOLIC BLOOD PRESSURE: 84 MMHG | WEIGHT: 150 LBS | HEIGHT: 68 IN | TEMPERATURE: 97.9 F | HEART RATE: 97 BPM | RESPIRATION RATE: 16 BRPM | BODY MASS INDEX: 22.73 KG/M2 | OXYGEN SATURATION: 100 % | SYSTOLIC BLOOD PRESSURE: 124 MMHG

## 2020-02-12 DIAGNOSIS — Z13.220 LIPID SCREENING: ICD-10-CM

## 2020-02-12 DIAGNOSIS — Z94.1 TRANSPLANTED HEART (H): Primary | ICD-10-CM

## 2020-02-12 DIAGNOSIS — E11.9 DIABETES MELLITUS (H): ICD-10-CM

## 2020-02-12 DIAGNOSIS — Z94.1 TRANSPLANTED HEART (H): ICD-10-CM

## 2020-02-12 DIAGNOSIS — Z13.29 THYROID DISORDER SCREENING: ICD-10-CM

## 2020-02-12 DIAGNOSIS — Z12.5 PROSTATE CANCER SCREENING: ICD-10-CM

## 2020-02-12 PROBLEM — Z98.890 STATUS POST CORONARY ANGIOGRAM: Status: ACTIVE | Noted: 2020-02-12

## 2020-02-12 LAB
ALBUMIN SERPL-MCNC: 3.9 G/DL (ref 3.4–5)
ALP SERPL-CCNC: 226 U/L (ref 40–150)
ALT SERPL W P-5'-P-CCNC: 21 U/L (ref 0–70)
ANION GAP SERPL CALCULATED.3IONS-SCNC: 8 MMOL/L (ref 3–14)
AST SERPL W P-5'-P-CCNC: 17 U/L (ref 0–45)
BASOPHILS # BLD AUTO: 0 10E9/L (ref 0–0.2)
BASOPHILS NFR BLD AUTO: 1.4 %
BILIRUB SERPL-MCNC: 0.5 MG/DL (ref 0.2–1.3)
BUN SERPL-MCNC: 24 MG/DL (ref 7–30)
CALCIUM SERPL-MCNC: 8.7 MG/DL (ref 8.5–10.1)
CHLORIDE SERPL-SCNC: 110 MMOL/L (ref 94–109)
CHOLEST SERPL-MCNC: 171 MG/DL
CK SERPL-CCNC: 252 U/L (ref 30–300)
CMV DNA SPEC NAA+PROBE-ACNC: NORMAL [IU]/ML
CMV DNA SPEC NAA+PROBE-LOG#: NORMAL {LOG_IU}/ML
CO2 SERPL-SCNC: 22 MMOL/L (ref 20–32)
CREAT SERPL-MCNC: 1.29 MG/DL (ref 0.66–1.25)
DIFFERENTIAL METHOD BLD: ABNORMAL
EOSINOPHIL # BLD AUTO: 0.1 10E9/L (ref 0–0.7)
EOSINOPHIL NFR BLD AUTO: 4.8 %
ERYTHROCYTE [DISTWIDTH] IN BLOOD BY AUTOMATED COUNT: 13.6 % (ref 10–15)
GFR SERPL CREATININE-BSD FRML MDRD: 61 ML/MIN/{1.73_M2}
GLUCOSE SERPL-MCNC: 85 MG/DL (ref 70–99)
HBA1C MFR BLD: 5.8 % (ref 0–5.6)
HCT VFR BLD AUTO: 34.2 % (ref 40–53)
HDLC SERPL-MCNC: 67 MG/DL
HGB BLD-MCNC: 10.5 G/DL (ref 13.3–17.7)
IMM GRANULOCYTES # BLD: 0 10E9/L (ref 0–0.4)
IMM GRANULOCYTES NFR BLD: 0.5 %
KCT BLD-ACNC: 199 SEC (ref 75–150)
KCT BLD-ACNC: 223 SEC (ref 75–150)
LACTATE BLD-SCNC: 0.6 MMOL/L (ref 0.7–2)
LDLC SERPL CALC-MCNC: 83 MG/DL
LYMPHOCYTES # BLD AUTO: 0.8 10E9/L (ref 0.8–5.3)
LYMPHOCYTES NFR BLD AUTO: 39.6 %
MAGNESIUM SERPL-MCNC: 2 MG/DL (ref 1.6–2.3)
MCH RBC QN AUTO: 25.2 PG (ref 26.5–33)
MCHC RBC AUTO-ENTMCNC: 30.7 G/DL (ref 31.5–36.5)
MCV RBC AUTO: 82 FL (ref 78–100)
MONOCYTES # BLD AUTO: 0.5 10E9/L (ref 0–1.3)
MONOCYTES NFR BLD AUTO: 21.7 %
NEUTROPHILS # BLD AUTO: 0.7 10E9/L (ref 1.6–8.3)
NEUTROPHILS NFR BLD AUTO: 32 %
NONHDLC SERPL-MCNC: 104 MG/DL
NRBC # BLD AUTO: 0 10*3/UL
NRBC BLD AUTO-RTO: 0 /100
PHOSPHATE SERPL-MCNC: 3.9 MG/DL (ref 2.5–4.5)
PLATELET # BLD AUTO: 209 10E9/L (ref 150–450)
PLATELET # BLD EST: ABNORMAL 10*3/UL
POTASSIUM SERPL-SCNC: 4.7 MMOL/L (ref 3.4–5.3)
PROT SERPL-MCNC: 7.3 G/DL (ref 6.8–8.8)
PSA SERPL-ACNC: 0.31 UG/L (ref 0–4)
RBC # BLD AUTO: 4.16 10E12/L (ref 4.4–5.9)
SODIUM SERPL-SCNC: 140 MMOL/L (ref 133–144)
SPECIMEN SOURCE: NORMAL
T4 FREE SERPL-MCNC: 1.03 NG/DL (ref 0.76–1.46)
TACROLIMUS BLD-MCNC: 6.1 UG/L (ref 5–15)
TME LAST DOSE: NORMAL H
TRIGL SERPL-MCNC: 106 MG/DL
TSH SERPL DL<=0.005 MIU/L-ACNC: 0.16 MU/L (ref 0.4–4)
WBC # BLD AUTO: 2.1 10E9/L (ref 4–11)

## 2020-02-12 PROCEDURE — C1753 CATH, INTRAVAS ULTRASOUND: HCPCS | Performed by: INTERNAL MEDICINE

## 2020-02-12 PROCEDURE — 86352 CELL FUNCTION ASSAY W/STIM: CPT | Performed by: INTERNAL MEDICINE

## 2020-02-12 PROCEDURE — 40000172 ZZH STATISTIC PROCEDURE PREP ONLY

## 2020-02-12 PROCEDURE — 82550 ASSAY OF CK (CPK): CPT | Performed by: INTERNAL MEDICINE

## 2020-02-12 PROCEDURE — 80053 COMPREHEN METABOLIC PANEL: CPT | Performed by: INTERNAL MEDICINE

## 2020-02-12 PROCEDURE — 87799 DETECT AGENT NOS DNA QUANT: CPT | Performed by: INTERNAL MEDICINE

## 2020-02-12 PROCEDURE — 40000065 ZZH STATISTIC EKG NON-CHARGEABLE

## 2020-02-12 PROCEDURE — 83605 ASSAY OF LACTIC ACID: CPT | Performed by: INTERNAL MEDICINE

## 2020-02-12 PROCEDURE — 99152 MOD SED SAME PHYS/QHP 5/>YRS: CPT | Performed by: INTERNAL MEDICINE

## 2020-02-12 PROCEDURE — 25000132 ZZH RX MED GY IP 250 OP 250 PS 637: Performed by: PHYSICIAN ASSISTANT

## 2020-02-12 PROCEDURE — 88307 TISSUE EXAM BY PATHOLOGIST: CPT | Performed by: INTERNAL MEDICINE

## 2020-02-12 PROCEDURE — 88350 IMFLUOR EA ADDL 1ANTB STN PX: CPT | Performed by: INTERNAL MEDICINE

## 2020-02-12 PROCEDURE — 83036 HEMOGLOBIN GLYCOSYLATED A1C: CPT | Performed by: INTERNAL MEDICINE

## 2020-02-12 PROCEDURE — 99214 OFFICE O/P EST MOD 30 MIN: CPT | Mod: ZP | Performed by: INTERNAL MEDICINE

## 2020-02-12 PROCEDURE — C1894 INTRO/SHEATH, NON-LASER: HCPCS | Performed by: INTERNAL MEDICINE

## 2020-02-12 PROCEDURE — 92978 ENDOLUMINL IVUS OCT C 1ST: CPT | Performed by: INTERNAL MEDICINE

## 2020-02-12 PROCEDURE — 99153 MOD SED SAME PHYS/QHP EA: CPT | Performed by: INTERNAL MEDICINE

## 2020-02-12 PROCEDURE — 84439 ASSAY OF FREE THYROXINE: CPT | Performed by: INTERNAL MEDICINE

## 2020-02-12 PROCEDURE — 93458 L HRT ARTERY/VENTRICLE ANGIO: CPT | Mod: 26 | Performed by: INTERNAL MEDICINE

## 2020-02-12 PROCEDURE — C1887 CATHETER, GUIDING: HCPCS | Performed by: INTERNAL MEDICINE

## 2020-02-12 PROCEDURE — 80197 ASSAY OF TACROLIMUS: CPT | Performed by: INTERNAL MEDICINE

## 2020-02-12 PROCEDURE — 25000128 H RX IP 250 OP 636: Performed by: INTERNAL MEDICINE

## 2020-02-12 PROCEDURE — C1769 GUIDE WIRE: HCPCS | Performed by: INTERNAL MEDICINE

## 2020-02-12 PROCEDURE — G0103 PSA SCREENING: HCPCS | Performed by: INTERNAL MEDICINE

## 2020-02-12 PROCEDURE — 93005 ELECTROCARDIOGRAM TRACING: CPT

## 2020-02-12 PROCEDURE — 83735 ASSAY OF MAGNESIUM: CPT | Performed by: INTERNAL MEDICINE

## 2020-02-12 PROCEDURE — 36415 COLL VENOUS BLD VENIPUNCTURE: CPT | Performed by: INTERNAL MEDICINE

## 2020-02-12 PROCEDURE — G0463 HOSPITAL OUTPT CLINIC VISIT: HCPCS | Mod: ZF

## 2020-02-12 PROCEDURE — 88346 IMFLUOR 1ST 1ANTB STAIN PX: CPT | Performed by: INTERNAL MEDICINE

## 2020-02-12 PROCEDURE — 80061 LIPID PANEL: CPT | Performed by: INTERNAL MEDICINE

## 2020-02-12 PROCEDURE — 71046 X-RAY EXAM CHEST 2 VIEWS: CPT

## 2020-02-12 PROCEDURE — 93460 R&L HRT ART/VENTRICLE ANGIO: CPT | Performed by: INTERNAL MEDICINE

## 2020-02-12 PROCEDURE — 27210794 ZZH OR GENERAL SUPPLY STERILE: Performed by: INTERNAL MEDICINE

## 2020-02-12 PROCEDURE — 93010 ELECTROCARDIOGRAM REPORT: CPT | Performed by: INTERNAL MEDICINE

## 2020-02-12 PROCEDURE — 84100 ASSAY OF PHOSPHORUS: CPT | Performed by: INTERNAL MEDICINE

## 2020-02-12 PROCEDURE — 85025 COMPLETE CBC W/AUTO DIFF WBC: CPT | Performed by: INTERNAL MEDICINE

## 2020-02-12 PROCEDURE — 85347 COAGULATION TIME ACTIVATED: CPT

## 2020-02-12 PROCEDURE — 93505 ENDOMYOCARDIAL BIOPSY: CPT | Performed by: INTERNAL MEDICINE

## 2020-02-12 PROCEDURE — 25800030 ZZH RX IP 258 OP 636: Performed by: PHYSICIAN ASSISTANT

## 2020-02-12 PROCEDURE — 25000125 ZZHC RX 250: Performed by: INTERNAL MEDICINE

## 2020-02-12 PROCEDURE — 25000125 ZZHC RX 250: Performed by: PHYSICIAN ASSISTANT

## 2020-02-12 PROCEDURE — 84443 ASSAY THYROID STIM HORMONE: CPT | Performed by: INTERNAL MEDICINE

## 2020-02-12 PROCEDURE — 86832 HLA CLASS I HIGH DEFIN QUAL: CPT | Performed by: INTERNAL MEDICINE

## 2020-02-12 PROCEDURE — 86833 HLA CLASS II HIGH DEFIN QUAL: CPT | Performed by: INTERNAL MEDICINE

## 2020-02-12 PROCEDURE — 93505 ENDOMYOCARDIAL BIOPSY: CPT | Mod: 26 | Performed by: INTERNAL MEDICINE

## 2020-02-12 RX ORDER — FLUMAZENIL 0.1 MG/ML
0.2 INJECTION, SOLUTION INTRAVENOUS
Status: DISCONTINUED | OUTPATIENT
Start: 2020-02-12 | End: 2020-02-12 | Stop reason: HOSPADM

## 2020-02-12 RX ORDER — ATROPINE SULFATE 0.1 MG/ML
0.5 INJECTION INTRAVENOUS EVERY 5 MIN PRN
Status: DISCONTINUED | OUTPATIENT
Start: 2020-02-12 | End: 2020-02-12 | Stop reason: HOSPADM

## 2020-02-12 RX ORDER — TIROFIBAN HYDROCHLORIDE 50 UG/ML
0.15 INJECTION INTRAVENOUS CONTINUOUS PRN
Status: DISCONTINUED | OUTPATIENT
Start: 2020-02-12 | End: 2020-02-12 | Stop reason: HOSPADM

## 2020-02-12 RX ORDER — POTASSIUM CHLORIDE 750 MG/1
40 TABLET, EXTENDED RELEASE ORAL
Status: DISCONTINUED | OUTPATIENT
Start: 2020-02-12 | End: 2020-02-12 | Stop reason: HOSPADM

## 2020-02-12 RX ORDER — FENTANYL CITRATE 50 UG/ML
25-50 INJECTION, SOLUTION INTRAMUSCULAR; INTRAVENOUS
Status: DISCONTINUED | OUTPATIENT
Start: 2020-02-12 | End: 2020-02-12 | Stop reason: HOSPADM

## 2020-02-12 RX ORDER — SODIUM CHLORIDE 9 MG/ML
INJECTION, SOLUTION INTRAVENOUS CONTINUOUS
Status: DISCONTINUED | OUTPATIENT
Start: 2020-02-12 | End: 2020-02-12 | Stop reason: HOSPADM

## 2020-02-12 RX ORDER — NITROGLYCERIN 20 MG/100ML
.07-2 INJECTION INTRAVENOUS CONTINUOUS PRN
Status: DISCONTINUED | OUTPATIENT
Start: 2020-02-12 | End: 2020-02-12 | Stop reason: HOSPADM

## 2020-02-12 RX ORDER — NALOXONE HYDROCHLORIDE 0.4 MG/ML
.1-.4 INJECTION, SOLUTION INTRAMUSCULAR; INTRAVENOUS; SUBCUTANEOUS
Status: DISCONTINUED | OUTPATIENT
Start: 2020-02-12 | End: 2020-02-12 | Stop reason: HOSPADM

## 2020-02-12 RX ORDER — HEPARIN SODIUM 10000 [USP'U]/100ML
100-1000 INJECTION, SOLUTION INTRAVENOUS CONTINUOUS PRN
Status: DISCONTINUED | OUTPATIENT
Start: 2020-02-12 | End: 2020-02-12 | Stop reason: HOSPADM

## 2020-02-12 RX ORDER — HEPARIN SODIUM 1000 [USP'U]/ML
INJECTION, SOLUTION INTRAVENOUS; SUBCUTANEOUS
Status: DISCONTINUED | OUTPATIENT
Start: 2020-02-12 | End: 2020-02-12 | Stop reason: HOSPADM

## 2020-02-12 RX ORDER — ARGATROBAN 1 MG/ML
150 INJECTION, SOLUTION INTRAVENOUS
Status: DISCONTINUED | OUTPATIENT
Start: 2020-02-12 | End: 2020-02-12 | Stop reason: HOSPADM

## 2020-02-12 RX ORDER — NITROGLYCERIN 5 MG/ML
VIAL (ML) INTRAVENOUS
Status: DISCONTINUED | OUTPATIENT
Start: 2020-02-12 | End: 2020-02-12 | Stop reason: HOSPADM

## 2020-02-12 RX ORDER — POTASSIUM CHLORIDE 750 MG/1
20 TABLET, EXTENDED RELEASE ORAL
Status: DISCONTINUED | OUTPATIENT
Start: 2020-02-12 | End: 2020-02-12 | Stop reason: HOSPADM

## 2020-02-12 RX ORDER — LIDOCAINE 40 MG/G
CREAM TOPICAL
Status: DISCONTINUED | OUTPATIENT
Start: 2020-02-12 | End: 2020-02-12 | Stop reason: HOSPADM

## 2020-02-12 RX ORDER — EPTIFIBATIDE 2 MG/ML
2 INJECTION, SOLUTION INTRAVENOUS CONTINUOUS PRN
Status: DISCONTINUED | OUTPATIENT
Start: 2020-02-12 | End: 2020-02-12 | Stop reason: HOSPADM

## 2020-02-12 RX ORDER — DOBUTAMINE HYDROCHLORIDE 200 MG/100ML
2-20 INJECTION INTRAVENOUS CONTINUOUS PRN
Status: DISCONTINUED | OUTPATIENT
Start: 2020-02-12 | End: 2020-02-12 | Stop reason: HOSPADM

## 2020-02-12 RX ORDER — EPTIFIBATIDE 2 MG/ML
180 INJECTION, SOLUTION INTRAVENOUS EVERY 10 MIN PRN
Status: DISCONTINUED | OUTPATIENT
Start: 2020-02-12 | End: 2020-02-12 | Stop reason: HOSPADM

## 2020-02-12 RX ORDER — NALOXONE HYDROCHLORIDE 0.4 MG/ML
.2-.4 INJECTION, SOLUTION INTRAMUSCULAR; INTRAVENOUS; SUBCUTANEOUS
Status: DISCONTINUED | OUTPATIENT
Start: 2020-02-12 | End: 2020-02-12

## 2020-02-12 RX ORDER — VERAPAMIL HYDROCHLORIDE 2.5 MG/ML
INJECTION, SOLUTION INTRAVENOUS
Status: DISCONTINUED | OUTPATIENT
Start: 2020-02-12 | End: 2020-02-12 | Stop reason: HOSPADM

## 2020-02-12 RX ORDER — IOPAMIDOL 755 MG/ML
INJECTION, SOLUTION INTRAVASCULAR
Status: DISCONTINUED | OUTPATIENT
Start: 2020-02-12 | End: 2020-02-12 | Stop reason: HOSPADM

## 2020-02-12 RX ORDER — FENTANYL CITRATE 50 UG/ML
INJECTION, SOLUTION INTRAMUSCULAR; INTRAVENOUS
Status: DISCONTINUED | OUTPATIENT
Start: 2020-02-12 | End: 2020-02-12 | Stop reason: HOSPADM

## 2020-02-12 RX ORDER — DOPAMINE HYDROCHLORIDE 160 MG/100ML
2-20 INJECTION, SOLUTION INTRAVENOUS CONTINUOUS PRN
Status: DISCONTINUED | OUTPATIENT
Start: 2020-02-12 | End: 2020-02-12 | Stop reason: HOSPADM

## 2020-02-12 RX ORDER — ARGATROBAN 1 MG/ML
350 INJECTION, SOLUTION INTRAVENOUS
Status: DISCONTINUED | OUTPATIENT
Start: 2020-02-12 | End: 2020-02-12 | Stop reason: HOSPADM

## 2020-02-12 RX ADMIN — SODIUM CHLORIDE: 9 INJECTION, SOLUTION INTRAVENOUS at 09:19

## 2020-02-12 RX ADMIN — ASPIRIN 325 MG: 325 TABLET ORAL at 09:19

## 2020-02-12 ASSESSMENT — MIFFLIN-ST. JEOR
SCORE: 1534.79
SCORE: 1484.9

## 2020-02-12 ASSESSMENT — PAIN SCALES - GENERAL: PAINLEVEL: NO PAIN (0)

## 2020-02-12 NOTE — Clinical Note
dry, intact, no bleeding and no hematoma. 6 Fr sheath removed from the RRA. TR band placed until hemostasis is obtained. 12cc of air in the TR band. Labeled.

## 2020-02-12 NOTE — LETTER
February 12, 2020      RE: Everton Larios  [address] [facility] [id number]       To whom it may concern:    Everton Larios was admitted to the hospital 2/12/2020 for cardiovascular testing.  He cannot lift more than 5 pounds until Monday, 2/17/2020; as such, we will request that he be excused from work until Monday, 2/17/2020.      Sincerely,            Sharmaine Mariscal PA-C  2/12/2020

## 2020-02-12 NOTE — PRE-PROCEDURE
GENERAL PRE-PROCEDURE:   Procedure:  Coronary angiogram; right heart catheterization; endomyocardial biopsy; IVUS    Verbal consent obtained?: Yes    Written consent obtained?: Yes    Risks and benefits: Risks, benefits and alternatives were discussed    Consent given by:  Patient  Patient states understanding of procedure being performed: Yes    Patient's understanding of procedure matches consent: Yes    Procedure consent matches procedure scheduled: Yes    Expected level of sedation:  Moderate  Appropriately NPO:  Yes  ASA Class:  Class 2- mild systemic disease, no acute problems, no functional limitations  Mallampati  :  Grade 3- soft palate visible, posterior pharyngeal wall not visible  Lungs:  Lungs clear with good breath sounds bilaterally  Heart:  Normal heart sounds with tachycardia  History & Physical reviewed:  History and physical reviewed and no updates needed  Statement of review:  I have reviewed the lab findings, diagnostic data, medications, and the plan for sedation      Sharmaine Mariscal PA-C  2/12/2020

## 2020-02-12 NOTE — LETTER
Date:February 28, 2020      Patient was self referred, no letter generated. Do not send.        [facility] Health Information

## 2020-02-12 NOTE — PROGRESS NOTES
Pt arrives to 2a, with parents, for CORS/RHC/heart biopsy. H&P needs to be updated. Consent is signed. Pt prepped.

## 2020-02-12 NOTE — DISCHARGE INSTRUCTIONS
Going Home after Coronary Angiogram/RHC/IVUS        Name: Everton Larios  Medical Record Number:  [id number]  Today's Date: February 12, 2020        For 24 hours:         Have an adult stay with you for 24 hours.         Relax and take it easy.         Drink plenty of fluids.         You may eat your normal diet, unless your doctor tells you otherwise.         Do NOT make any important or legal decisions.         Do NOT drive or operate machines at home or at work.         Do NOT drink alcohol.      Do NOT smoke.     Medicines:   Your medicines have not changed.    Care of wrist site:         It is normal to have soreness at the puncture site and mild tingling in your hand for up to 3 days.         Remove the Band-Aid after 24 hours. If there is minor oozing, apply another Band-aid and remove it after 12 hours.          Do NOT take a bath, or use a hot tub or pool for the next 48 hours. You may shower.          It is normal to have a small bruise.  There should not be a lump at the site.         Do not scrub the site.         Do not use lotion or powder near the puncture site for 3 days.         For 2 days, do not use your hand or arm to support your weight (such as rising from a chair) or bend your wrist (such as lifting a garage door).         For 2 days, do not lift more than 5 pounds or exercise your arm (tennis, golf or bowling).    If you start bleeding from the site in your arm: Sit down and press firmly on the site with your fingers for 10 minutes. Call your doctor as soon as you can.    Call 911 right away if you have bleeding that is heavy or does not stop.     Call your doctor if:         You have a large or growing hard lump around the site.         The site is red, swollen, hot or tender.         Blood or fluid is draining from the site.         You have chills or a fever greater than 101 F (38 C).         Your leg or arm turns bluish, feels numb or cool.         You have hives, a rash or unusual  itching.

## 2020-02-12 NOTE — LETTER
2/12/2020      RE: Everton Larios  [address] [address]       Dear Colleague,    Thank you for the opportunity to participate in the care of your patient, Everton Larios, at the [facility]. Please see a copy of my visit note below.    No notes on file    Please do not hesitate to contact me if you have any questions/concerns.     Sincerely,     Corinna Donis MD

## 2020-02-12 NOTE — NURSING NOTE
Transplant Coordinator Note    Reason for visit: 1st annual   Coordinator: Present     Cors,rhc,hbx, cxr, and labs. C Mri cancelled due to cath.     Health concerns addressed today:  1. Due to get shingrix and pneumovax  2. Leukopenic at 2.1. MD reviewed. We will keep monitoring. Baseline between 2-4.   3. Reschedule Cardiac MRI  4. Last immuknow 238, no dsas.   5. Recent shingles.   6. Irregular heart beat.   7. No swelling in feet.   8. No recent fevers.   9. TSH low but Tfree4 normal.   10. Normal pressures reviewed with patient.   11. One blood vessel had thickening. 20%. Talk about switching patient to everolimus/rapa.     Immunosuppressants:  MMF 1500 bid  Tacro 3.5/3. goal 6-8.       Labs:   CMV today not detected.     Immuknow, dsas, ebv, allomap, hbx pending.     Schedule biopsy and Cardiac MRI on Wednesday or Friday.       Coronary angiogram, RHC, and resulted labs reviewed with patient  Medication record reviewed and reconciled  Questions and concerns addressed  Pt verbalized an understanding of plan of care.     Patient Instructions  1. Complete your MMF. Then we will switch you to everolimus.   2. 1 month after changing meds, we will repeat a heart biopsy. A little early at your 15 month. We will do cardiac MRI at that time as well.    3. I will call you with all pending lab results.     Next transplant clinic appointment:  16 month.   Next lab draw: TBD  Coordinator will call with all pending results.     Please call transplant coordinator with any questions:    Jocelynn Jerez RN BSN   Post Heart Transplant Nurse Coordinator  [facility]  Questions: [phone]

## 2020-02-12 NOTE — PATIENT INSTRUCTIONS
Patient Instructions  1. Complete your MMF. Then we will switch you to everolimus.   2. 1 month after changing meds, we will repeat a heart biopsy. A little early at your 15 month. We will do cardiac MRI at that time as well.    3. I will call you with all pending lab results.     Next transplant clinic appointment:  16 month.   Next lab draw: TBD  Coordinator will call with all pending results.     Please call transplant coordinator with any questions:    Jocelynn Jerez RN BSN   Post Heart Transplant Nurse Coordinator  [facility]  Questions: [phone]

## 2020-02-13 ENCOUNTER — TELEPHONE (OUTPATIENT)
Dept: GASTROENTEROLOGY | Facility: CLINIC | Age: 55
End: 2020-02-13

## 2020-02-13 LAB
COPATH REPORT: NORMAL
DONOR IDENTIFICATION: NORMAL
DSA COMMENTS: NORMAL
DSA PRESENT: NO
DSA TEST METHOD: NORMAL
EBV DNA # SPEC NAA+PROBE: NORMAL {COPIES}/ML
EBV DNA SPEC NAA+PROBE-LOG#: NORMAL {LOG_COPIES}/ML
ORGAN: NORMAL
SA1 CELL: NORMAL
SA1 COMMENTS: NORMAL
SA1 HI RISK ABY: NORMAL
SA1 MOD RISK ABY: NORMAL
SA1 TEST METHOD: NORMAL
SA2 CELL: NORMAL
SA2 COMMENTS: NORMAL
SA2 HI RISK ABY UA: NORMAL
SA2 MOD RISK ABY: NORMAL
SA2 TEST METHOD: NORMAL
UNACCEPTABLE ANTIGEN: NORMAL
UNOS CPRA: 0

## 2020-02-13 NOTE — TELEPHONE ENCOUNTER
Patient Name: Everton Larios   : 1965  MRN: [id number]       :  N/A    [employer] Active    VM with information needed to complete pre-assessment call.  Request pt contact Endoscopy Pre-assessment RN to complete upcoming procedure information.  Telephone call-back number provided.    [x] Resent via [facility] - This includes: Split-dose Golytely  instructions, MAC Instructions, procedure date/time/location/provider.        Reta Jacinto, RN  [facility]    Additional Information regarding appointment:  _____________________________________________________      Patient scheduled for:  Colonoscopy    Indication for procedure. Ulcerative pancolitis; Heart transplanted (2019)    Sedation Type: MAC    Procedure Provider:  Carlos      Referring Provider. Tiffany (order 2019); New    Arrival time verified: 2020    Facility location verified:   [address] [address]  1st Floor, Rm [id number]    [x] N/A for this Pay[place]    Pt meets medical necessity for outpatient procedure in hospital Endoscopy Unit: N/A      History & Physical:  [x] Complete, Date 2020 - [facility]      __________________________________________________      Prep Type:   [x]Golytely  Pharmacy : Need to confirm: [id number] [place] [address]      Patient taking any blood thinners ? No  Anticoagulants or blood thinners:          [x] ASA 81mg  - may continue   ................................................            Electronic implanted medical devices ? No

## 2020-02-14 ENCOUNTER — TELEPHONE (OUTPATIENT)
Dept: GASTROENTEROLOGY | Facility: CLINIC | Age: 55
End: 2020-02-14

## 2020-02-14 DIAGNOSIS — Z12.11 SPECIAL SCREENING FOR MALIGNANT NEOPLASMS, COLON: Primary | ICD-10-CM

## 2020-02-14 LAB
IMMUKNOW IMMUNE CELL FUNCTION: 135 NG/ML
INTERPRETATION ECG - MUSE: NORMAL

## 2020-02-14 NOTE — TELEPHONE ENCOUNTER
Patient scheduled for: Colonoscopy    Indication for procedure. Ulcerative pancolitis     Referring Provider: Dr. Jocelynn Jensen, Dr. Genevieve Allen    ? N/A    Arrival time verified? 9:45am, 2/20    Facility location verified? [facility], [address]    Instructions given regarding prep and procedure    Prep Type: Split-Dose Golytely.  Patient had mailed instructions, denied need for review    Are you taking any anticoagulants or blood thinners? 81mg ASA    Instructions given? Reviewed no need to stop 81mg ASA    Electronic implanted devices? None    Pre procedure teaching completed? Yes    Transportation from procedure? Yes, parents.  Parents will stay with patient for 24 hours post-procedure    H&P / Pre op physical completed? Yes, on 2/4 at [facility] (In Care Everywhere)

## 2020-02-17 LAB
ALLOMAP SCORE (EXTERNAL): 34
NEGATIVE PREDICTIVE VALUE PERCENT (EXTERNAL): 98.9 %
POSITIVE PREDICTIVE VALUE PERCENT (EXTERNAL): 4.1 %

## 2020-02-20 ENCOUNTER — ANESTHESIA EVENT (OUTPATIENT)
Dept: GASTROENTEROLOGY | Facility: CLINIC | Age: 55
End: 2020-02-20
Payer: COMMERCIAL

## 2020-02-20 ENCOUNTER — RECORDS - HEALTHEAST (OUTPATIENT)
Dept: ADMINISTRATIVE | Facility: OTHER | Age: 55
End: 2020-02-20

## 2020-02-20 ENCOUNTER — HOSPITAL ENCOUNTER (OUTPATIENT)
Facility: CLINIC | Age: 55
Discharge: HOME | End: 2020-02-20
Attending: INTERNAL MEDICINE | Admitting: INTERNAL MEDICINE
Payer: COMMERCIAL

## 2020-02-20 ENCOUNTER — ANESTHESIA (OUTPATIENT)
Dept: GASTROENTEROLOGY | Facility: CLINIC | Age: 55
End: 2020-02-20
Payer: COMMERCIAL

## 2020-02-20 VITALS
RESPIRATION RATE: 12 BRPM | SYSTOLIC BLOOD PRESSURE: 102 MMHG | DIASTOLIC BLOOD PRESSURE: 75 MMHG | HEART RATE: 103 BPM | OXYGEN SATURATION: 100 %

## 2020-02-20 LAB — COLONOSCOPY: NORMAL

## 2020-02-20 PROCEDURE — 25800030 ZZH RX IP 258 OP 636: Performed by: NURSE ANESTHETIST, CERTIFIED REGISTERED

## 2020-02-20 PROCEDURE — 88305 TISSUE EXAM BY PATHOLOGIST: CPT | Performed by: INTERNAL MEDICINE

## 2020-02-20 PROCEDURE — 37000009 ZZH ANESTHESIA TECHNICAL FEE, EACH ADDTL 15 MIN: Performed by: INTERNAL MEDICINE

## 2020-02-20 PROCEDURE — 25000128 H RX IP 250 OP 636: Performed by: NURSE ANESTHETIST, CERTIFIED REGISTERED

## 2020-02-20 PROCEDURE — 25000132 ZZH RX MED GY IP 250 OP 250 PS 637: Performed by: INTERNAL MEDICINE

## 2020-02-20 PROCEDURE — 37000008 ZZH ANESTHESIA TECHNICAL FEE, 1ST 30 MIN: Performed by: INTERNAL MEDICINE

## 2020-02-20 PROCEDURE — 45380 COLONOSCOPY AND BIOPSY: CPT | Performed by: INTERNAL MEDICINE

## 2020-02-20 PROCEDURE — 88342 IMHCHEM/IMCYTCHM 1ST ANTB: CPT | Performed by: INTERNAL MEDICINE

## 2020-02-20 RX ORDER — LIDOCAINE 40 MG/G
CREAM TOPICAL
Status: DISCONTINUED | OUTPATIENT
Start: 2020-02-20 | End: 2020-02-20 | Stop reason: HOSPADM

## 2020-02-20 RX ORDER — PROPOFOL 10 MG/ML
INJECTION, EMULSION INTRAVENOUS PRN
Status: DISCONTINUED | OUTPATIENT
Start: 2020-02-20 | End: 2020-02-20

## 2020-02-20 RX ORDER — PROPOFOL 10 MG/ML
INJECTION, EMULSION INTRAVENOUS CONTINUOUS PRN
Status: DISCONTINUED | OUTPATIENT
Start: 2020-02-20 | End: 2020-02-20

## 2020-02-20 RX ORDER — SIMETHICONE
LIQUID (ML) MISCELLANEOUS PRN
Status: DISCONTINUED | OUTPATIENT
Start: 2020-02-20 | End: 2020-02-20 | Stop reason: HOSPADM

## 2020-02-20 RX ORDER — SODIUM CHLORIDE, SODIUM LACTATE, POTASSIUM CHLORIDE, CALCIUM CHLORIDE 600; 310; 30; 20 MG/100ML; MG/100ML; MG/100ML; MG/100ML
INJECTION, SOLUTION INTRAVENOUS CONTINUOUS PRN
Status: DISCONTINUED | OUTPATIENT
Start: 2020-02-20 | End: 2020-02-20

## 2020-02-20 RX ORDER — FENTANYL CITRATE 50 UG/ML
INJECTION, SOLUTION INTRAMUSCULAR; INTRAVENOUS PRN
Status: DISCONTINUED | OUTPATIENT
Start: 2020-02-20 | End: 2020-02-20

## 2020-02-20 RX ORDER — ONDANSETRON 2 MG/ML
4 INJECTION INTRAMUSCULAR; INTRAVENOUS
Status: DISCONTINUED | OUTPATIENT
Start: 2020-02-20 | End: 2020-02-20 | Stop reason: HOSPADM

## 2020-02-20 RX ORDER — ONDANSETRON 4 MG/1
4 TABLET, ORALLY DISINTEGRATING ORAL EVERY 6 HOURS PRN
Status: DISCONTINUED | OUTPATIENT
Start: 2020-02-20 | End: 2020-02-20 | Stop reason: HOSPADM

## 2020-02-20 RX ORDER — FLUMAZENIL 0.1 MG/ML
0.2 INJECTION, SOLUTION INTRAVENOUS
Status: DISCONTINUED | OUTPATIENT
Start: 2020-02-20 | End: 2020-02-20 | Stop reason: HOSPADM

## 2020-02-20 RX ORDER — NALOXONE HYDROCHLORIDE 0.4 MG/ML
.1-.4 INJECTION, SOLUTION INTRAMUSCULAR; INTRAVENOUS; SUBCUTANEOUS
Status: DISCONTINUED | OUTPATIENT
Start: 2020-02-20 | End: 2020-02-20 | Stop reason: HOSPADM

## 2020-02-20 RX ORDER — ONDANSETRON 2 MG/ML
4 INJECTION INTRAMUSCULAR; INTRAVENOUS EVERY 6 HOURS PRN
Status: DISCONTINUED | OUTPATIENT
Start: 2020-02-20 | End: 2020-02-20 | Stop reason: HOSPADM

## 2020-02-20 RX ADMIN — PHENYLEPHRINE HYDROCHLORIDE 100 MCG: 10 INJECTION INTRAVENOUS at 12:15

## 2020-02-20 RX ADMIN — PHENYLEPHRINE HYDROCHLORIDE 100 MCG: 10 INJECTION INTRAVENOUS at 12:43

## 2020-02-20 RX ADMIN — PROPOFOL 100 MCG/KG/MIN: 10 INJECTION, EMULSION INTRAVENOUS at 12:02

## 2020-02-20 RX ADMIN — PROPOFOL 10 MG: 10 INJECTION, EMULSION INTRAVENOUS at 12:08

## 2020-02-20 RX ADMIN — PHENYLEPHRINE HYDROCHLORIDE 100 MCG: 10 INJECTION INTRAVENOUS at 12:33

## 2020-02-20 RX ADMIN — PHENYLEPHRINE HYDROCHLORIDE 50 MCG: 10 INJECTION INTRAVENOUS at 12:39

## 2020-02-20 RX ADMIN — PHENYLEPHRINE HYDROCHLORIDE 100 MCG: 10 INJECTION INTRAVENOUS at 12:20

## 2020-02-20 RX ADMIN — FENTANYL CITRATE 50 MCG: 50 INJECTION, SOLUTION INTRAMUSCULAR; INTRAVENOUS at 12:01

## 2020-02-20 RX ADMIN — FENTANYL CITRATE 50 MCG: 50 INJECTION, SOLUTION INTRAMUSCULAR; INTRAVENOUS at 12:02

## 2020-02-20 RX ADMIN — PHENYLEPHRINE HYDROCHLORIDE 100 MCG: 10 INJECTION INTRAVENOUS at 12:08

## 2020-02-20 RX ADMIN — SODIUM CHLORIDE, POTASSIUM CHLORIDE, SODIUM LACTATE AND CALCIUM CHLORIDE: 600; 310; 30; 20 INJECTION, SOLUTION INTRAVENOUS at 11:50

## 2020-02-20 RX ADMIN — PROPOFOL 30 MG: 10 INJECTION, EMULSION INTRAVENOUS at 12:07

## 2020-02-20 RX ADMIN — PHENYLEPHRINE HYDROCHLORIDE 100 MCG: 10 INJECTION INTRAVENOUS at 12:12

## 2020-02-20 RX ADMIN — PHENYLEPHRINE HYDROCHLORIDE 100 MCG: 10 INJECTION INTRAVENOUS at 12:41

## 2020-02-20 RX ADMIN — PHENYLEPHRINE HYDROCHLORIDE 100 MCG: 10 INJECTION INTRAVENOUS at 12:24

## 2020-02-20 RX ADMIN — PHENYLEPHRINE HYDROCHLORIDE 100 MCG: 10 INJECTION INTRAVENOUS at 12:37

## 2020-02-20 RX ADMIN — PHENYLEPHRINE HYDROCHLORIDE 150 MCG: 10 INJECTION INTRAVENOUS at 12:30

## 2020-02-20 RX ADMIN — PROPOFOL 20 MG: 10 INJECTION, EMULSION INTRAVENOUS at 12:11

## 2020-02-20 RX ADMIN — PROPOFOL 30 MG: 10 INJECTION, EMULSION INTRAVENOUS at 12:15

## 2020-02-20 RX ADMIN — PROPOFOL 40 MG: 10 INJECTION, EMULSION INTRAVENOUS at 12:05

## 2020-02-20 RX ADMIN — PHENYLEPHRINE HYDROCHLORIDE 100 MCG: 10 INJECTION INTRAVENOUS at 12:26

## 2020-02-20 NOTE — ANESTHESIA POSTPROCEDURE EVALUATION
Anesthesia POST Procedure Evaluation    Patient: Everton Larios   MRN:     [id number] Gender:   male   Age:    54 year old :      1965        Preoperative Diagnosis: Ulcerative pancolitis (H) [K51.00]  Heart transplanted (H) [Z94.1]   Procedure(s):  COLONOSCOPY, WITH POLYPECTOMY AND BIOPSY   Postop Comments: No value filed.     Anesthesia Type: MAC          Postop Pain Control: Uneventful            Sign Out: Well controlled pain   PONV: No   Neuro/Psych: Uneventful            Sign Out: Acceptable/Baseline neuro status   Airway/Respiratory: Uneventful            Sign Out: Acceptable/Baseline resp. status   CV/Hemodynamics: Uneventful            Sign Out: Acceptable CV status   Other NRE: NONE   DID A NON-ROUTINE EVENT OCCUR? No         Last Anesthesia Record Vitals:  CRNA VITALS  2020 1222 - 2020 1313      2020             Pulse:  93    Ht Rate:  93    SpO2:  99 %    Resp Rate (set):  10          Last PACU Vitals:  Vitals Value Taken Time   BP     Temp     Pulse     Resp     SpO2     Temp src     NIBP 114/62 2020 12:52 PM   Pulse 93 2020 12:53 PM   SpO2 99 % 2020 12:53 PM   Resp     Temp     Ht Rate 93 2020 12:53 PM   Temp 2           Electronically Signed By: Silvestre Payne MD, 2020, 1:13 PM

## 2020-02-20 NOTE — LETTER
February 26, 2020       TO: Everton Larios  [address] [facility] [address]       Dear ,    The pathology results returned from the biopsies taken at your recent colonoscopy.    The colon biopsies showed some mild inflammation in your large bowel which may be related to your transplant medications.    I will pass these results on to your GI and heart transplant teams to make the next decisions in your care.    Sincerely,               Ranjeet Gonzalez MD   [facility], [place], ENDOSCOPY  [address] [address]  Phone: [phone]

## 2020-02-20 NOTE — ANESTHESIA PREPROCEDURE EVALUATION
Anesthesia Pre-Procedure Evaluation    Patient: Everton Larios   MRN:     [id number] Gender:   male   Age:    54 year old :      1965        Preoperative Diagnosis: Ulcerative pancolitis (H) [K51.00]  Heart transplanted (H) [Z94.1]   Procedure(s):  COLONOSCOPY     LABS:  CBC:   Lab Results   Component Value Date    WBC 2.1 (L) 2020    WBC 3.3 (L) 2020    HGB 10.5 (L) 2020    HGB 10.2 (L) 2020    HCT 34.2 (L) 2020    HCT 32.1 (L) 2020     2020     2020     BMP:   Lab Results   Component Value Date     2020     2020    POTASSIUM 4.7 2020    POTASSIUM 4.7 2020    CHLORIDE 110 (H) 2020    CHLORIDE 109 2020    CO2 22 2020    CO2 21 2020    BUN 24 2020    BUN 23 2020    CR 1.29 (H) 2020    CR 1.11 2020    GLC 85 2020    GLC 99 2020     COAGS:   Lab Results   Component Value Date    PTT 35 2019    INR 1.17 (H) 2019    FIBR 189 (L) 2019     POC:   Lab Results   Component Value Date    BGM 99 2019     OTHER:   Lab Results   Component Value Date    PH 7.32 (L) 2019    LACT 1.2 2019    A1C 5.8 (H) 2020    RADAMES 8.7 2020    PHOS 3.9 2020    MAG 2.0 2020    ALBUMIN 3.9 2020    PROTTOTAL 7.3 2020    ALT 21 2020    AST 17 2020     (H) 2019    ALKPHOS 226 (H) 2020    BILITOTAL 0.5 2020    LIPASE 151 2019    AMYLASE 51 2019    TSH 0.16 (L) 2020    T4 1.03 2020    T3 102 2020    CRP <2.9 2019    SED 17 2019        Preop Vitals    BP Readings from Last 3 Encounters:   20 (!) 123/90   20 120/83   20 124/84    Pulse Readings from Last 3 Encounters:   20 104   20 97   19 105      Resp Readings from Last 3 Encounters:   20 16   19 16   10/15/19 18    SpO2 Readings from Last 3  "Encounters:   02/12/20 99%   02/12/20 100%   12/18/19 100%      Temp Readings from Last 1 Encounters:   02/12/20 36.6  C (97.9  F) (Oral)    Ht Readings from Last 1 Encounters:   02/12/20 1.727 m (5' 8\")      Wt Readings from Last 1 Encounters:   02/12/20 73 kg (161 lb)    Estimated body mass index is 24.48 kg/m  as calculated from the following:    Height as of 2/12/20: 1.727 m (5' 8\").    Weight as of 2/12/20: 73 kg (161 lb).     LDA:  Peripheral IV 02/12/20 Left Upper forearm (Active)   Number of days: 8       Right Radial Interventional Procedure Access (Active)   Site Assessment [facility] 2/12/2020  2:30 PM   Hemostasis management Radial hemostasis device on patient 2/12/2020  2:30 PM   Radial device volume remaining 4 mL 2/12/2020  2:30 PM   CMS Right Arm WD 2/12/2020  2:30 PM   Radial Pulse - Right Arm Normal 2/12/2020  2:30 PM   Number of days: 8       Right Jugular Interventional Procedure Access (Active)   Site Assessment [facility] 2/12/2020  2:30 PM   Hemostasis management Other (Comment) 2/12/2020  2:30 PM   CMS Right Arm WDL 2/12/2020  2:30 PM   Radial Pulse - Right Arm Normal 2/12/2020  2:30 PM   Number of days: 8        Past Medical History:   Diagnosis Date     Alcohol abuse      Pierce's esophagus 10/4/2018     Chronic rhinitis 10/4/2018     Chronic systolic heart failure (H) 10/4/2018     Depression 10/4/2018     Diastolic dysfunction      DJD (degenerative joint disease) - neck 10/4/2018     H/O congenital atrial septal defect (ASD) repair at age 3 10/4/2018     Hypothyroidism due to medication 10/4/2018     ICD, [employer] 2008; gen change 2/2018 10/4/2018     Intermittent asthma without complication 10/4/2018     Nonischemic cardiomyopathy (H) 10/4/2018     On amiodarone therapy 10/4/2018     Paroxysmal atrial fibrillation (H) 10/4/2018     S/P ablation of atrial fibrillation 10/4/2018     Systolic heart failure (H)      Ulcerative colitis (H) 2005     Ventricular tachycardia (H) 10/4/2018    "   Past Surgical History:   Procedure Laterality Date     AICD, DUAL CHAMBER       BRONCHOSCOPY (RIGID OR FLEXIBLE), DIAGNOSTIC N/A 4/30/2019    Procedure: BRONCHOSCOPY, WITH BAL;  Surgeon: Margot Chiang MD;  Location:  GI     CV CORONARY ANGIOGRAM N/A 2/12/2020    Procedure: CV CORONARY ANGIOGRAM;  Surgeon: Isiah Mcallister MD;  Location:  HEART CARDIAC CATH LAB     CV HEART BIOPSY N/A 3/4/2019    Procedure: Heart Biopsy;  Surgeon: Abhijeet Titus MD;  Location:  HEART CARDIAC CATH LAB     CV HEART BIOPSY N/A 3/25/2019    Procedure: Heart Biopsy;  Surgeon: Yves Rodrigues MD;  Location:  HEART CARDIAC CATH LAB     CV HEART BIOPSY N/A 3/28/2019    Procedure: Heart Cath Heart Biopsy;  Surgeon: Yves Rodrigues MD;  Location:  HEART CARDIAC CATH LAB     CV HEART BIOPSY N/A 4/10/2019    Procedure: CV HEART BIOPSY;  Surgeon: Isiah Mcallister MD;  Location:  HEART CARDIAC CATH LAB     CV HEART BIOPSY N/A 4/24/2019    Procedure: CV HEART BIOPSY;  Surgeon: Isiah Mcallister MD;  Location:  HEART CARDIAC CATH LAB     CV HEART BIOPSY N/A 5/8/2019    Procedure: CV HEART BIOPSY;  Surgeon: Isiah Mcallister MD;  Location:  HEART CARDIAC CATH LAB     CV HEART BIOPSY N/A 6/4/2019    Procedure: CV HEART BIOPSY;  Surgeon: Alton Solomon MD;  Location:  HEART CARDIAC CATH LAB     CV HEART BIOPSY N/A 5/22/2019    Procedure: CV HEART BIOPSY;  Surgeon: Yves Rodrigues MD;  Location:  HEART CARDIAC CATH LAB     CV HEART BIOPSY N/A 7/17/2019    Procedure: CV HEART BIOPSY;  Surgeon: Isiah Mcallister MD;  Location:  HEART CARDIAC CATH LAB     CV HEART BIOPSY N/A 8/13/2019    Procedure: CV HEART BIOPSY;  Surgeon: Jackson Patten MD;  Location:  HEART CARDIAC CATH LAB     CV HEART BIOPSY N/A 10/15/2019    Procedure: CV HEART BIOPSY;  Surgeon: Santino Mckeon MD;  Location:  HEART CARDIAC CATH LAB     CV HEART BIOPSY N/A 2/12/2020    Procedure: CV HEART  BIOPSY;  Surgeon: Isiah Mcallister MD;  Location:  HEART CARDIAC CATH LAB     CV INTRA-AORTIC BALLOON PUMP INSERTION N/A 2/18/2019    Procedure: Intra-Aortic Balloon;  Surgeon: Alton Solomon MD;  Location:  HEART CARDIAC CATH LAB     CV INTRA-AORTIC BALLOON PUMP INSERTION N/A 2/20/2019    Procedure: Replace subclavian IABP;  Surgeon: Yves Rodrigues MD;  Location:  HEART CARDIAC CATH LAB     CV INTRAVASULAR ULTRASOUND N/A 2/12/2020    Procedure: CV INTRAVASCULAR ULTRASOUND;  Surgeon: Isiah Mcallister MD;  Location:  HEART CARDIAC CATH LAB     CV LEFT HEART CATH N/A 3/19/2019    Procedure: Left Heart Cath;  Surgeon: Yves Rodrigues MD;  Location:  HEART CARDIAC CATH LAB     CV LEFT HEART CATH N/A 2/12/2020    Procedure: Left Heart Cath;  Surgeon: Isiah Mcallister MD;  Location:  HEART CARDIAC CATH LAB     CV RIGHT HEART CATH N/A 3/19/2019    Procedure: RHC/HBx - Femoral access for 3/19 per Nimisha GONZALEZ;  Surgeon: Yves Rodrigues MD;  Location:  HEART CARDIAC CATH LAB     CV RIGHT HEART CATH N/A 3/25/2019    Procedure: Right Heart Cath;  Surgeon: Yves Rodrigues MD;  Location:  HEART CARDIAC CATH LAB     CV RIGHT HEART CATH N/A 3/28/2019    Procedure: Heart Cath Right Heart Cath;  Surgeon: Yves Rodrigues MD;  Location:  HEART CARDIAC CATH LAB     CV RIGHT HEART CATH N/A 4/24/2019    Procedure: CV RIGHT HEART CATH;  Surgeon: Isiah Mcallister MD;  Location:  HEART CARDIAC CATH LAB     CV RIGHT HEART CATH N/A 3/11/2019    Procedure: ADD ON RHC/HBX;  Surgeon: Alton Solomon MD;  Location:  HEART CARDIAC CATH LAB     CV RIGHT HEART CATH N/A 6/4/2019    Procedure: CV RIGHT HEART CATH;  Surgeon: Alton Solomon MD;  Location:  HEART CARDIAC CATH LAB     CV RIGHT HEART CATH N/A 5/22/2019    Procedure: CV RIGHT HEART CATH;  Surgeon: Yves Rodrigues MD;  Location:  HEART CARDIAC CATH LAB     CV RIGHT HEART CATH N/A 8/13/2019    Procedure: CV  RIGHT HEART CATH;  Surgeon: Jackson Patten MD;  Location:  HEART CARDIAC CATH LAB     CV RIGHT HEART CATH N/A 10/15/2019    Procedure: CV RIGHT HEART CATH;  Surgeon: Santino Mckeon MD;  Location:  HEART CARDIAC CATH LAB     CV RIGHT HEART CATH N/A 2/12/2020    Procedure: CV RIGHT HEART CATH;  Surgeon: Isiah Mcallister MD;  Location:  HEART CARDIAC CATH LAB     INCISION AND DRAINAGE CHEST WASHOUT, COMBINED N/A 3/21/2019    Procedure: Incision And Drainage; Evacuation Right Chest Wall Hematoma;  Surgeon: Dewayne House MD;  Location: [facility] OR     INSERT INTRAAORTIC BALLOON PUMP Left 2/19/2019    Procedure: Insert left  Subclavian Balloon Pump,  Removal Right femoral arterial balloom pump sheath;  Surgeon: Dewayne House MD;  Location: [facility] OR     INSERT INTRAAORTIC BALLOON PUMP Left 2/21/2019    Procedure: SUBCLAVIAN BALLOON PUMP PLACEMENT;  Surgeon: Ben White MD;  Location: [facility] OR     IR PICC PLACEMENT > 5 YRS OF AGE  5/8/2019     TRANSPLANT HEART RECIPIENT N/A 2/24/2019    Procedure: TRANSPLANT HEART RECIPIENT;  Surgeon: Dewayne House MD;  Location: [facility] OR      Allergies   Allergen Reactions     Hydromorphone Other (See Comments)     Significant Delirium     Adhesive Tape Blisters     Tegaderm causes bruises, blisters and extreme irritation.     Lisinopril Other (See Comments)     hypotension     Quinolones Other (See Comments)     Would not rx quinolones until QTc interval improved.      Tobramycin Other (See Comments)     Would not use aminoglycosides as his kidney function is very borderline     Codeine Nausea             JZG FV AN PHYSICAL EXAM    Assessment:   ASA SCORE: 3    H&P: History and physical reviewed and following examination; no interval change.         Plan:   Anes. Type:  MAC   Pre-Medication: None   Induction:  N/a   Airway: Native Airway   Access/Monitoring: PIV   Maintenance: N/a     Postop Plan:   Postop Pain: None  Postop Sedation/Airway: Not  planned     CONSENT: Direct conversation   Plan and risks discussed with: Patient   Blood Products: Consent Deferred (Minimal Blood Loss)               Chart reviewed and patient examined. Plan discussed with patient.   MD Silvestre Aponte MD

## 2020-02-20 NOTE — ANESTHESIA CARE TRANSFER NOTE
Patient: Everton Larios    Procedure(s):  COLONOSCOPY, WITH POLYPECTOMY AND BIOPSY    Diagnosis: Ulcerative pancolitis (H) [K51.00]  Heart transplanted (H) [Z94.1]  Diagnosis Additional Information: No value filed.    Anesthesia Type:   MAC     Note:  Airway :Room Air  Patient transferred to:Phase II  Comments: Pt awake and conversing.  Transported to Phase II.  Report given to RN at bedside.  Handoff Report: Identifed the Patient, Identified the Reponsible Provider, Reviewed the pertinent medical history, Discussed the surgical course, Reviewed Intra-OP anesthesia mangement and issues during anesthesia, Set expectations for post-procedure period and Allowed opportunity for questions and acknowledgement of understanding      Vitals: (Last set prior to Anesthesia Care Transfer)    CRNA VITALS  2/20/2020 1222 - 2/20/2020 1311      2/20/2020             Pulse:  93    Ht Rate:  93    SpO2:  99 %    Resp Rate (set):  10                Electronically Signed By: TAI Kelley CRNA  February 20, 2020  1:11 PM

## 2020-02-24 ENCOUNTER — TELEPHONE (OUTPATIENT)
Dept: TRANSPLANT | Facility: CLINIC | Age: 55
End: 2020-02-24

## 2020-02-24 NOTE — TELEPHONE ENCOUNTER
Call returned. Pt states he called his insurance to discuss everolimus and it will cost $400+ out of pocket. We will submit a PA and see what coverage we can get before we transition. Pt agreed with plan.

## 2020-02-25 DIAGNOSIS — Z94.1 TRANSPLANTED HEART (H): Primary | ICD-10-CM

## 2020-02-25 RX ORDER — EVEROLIMUS 0.5 MG/1
0.5 TABLET ORAL 2 TIMES DAILY
Qty: 180 TABLET | Refills: 3 | Status: SHIPPED | OUTPATIENT
Start: 2020-02-25 | End: 2020-02-28 | Stop reason: ALTCHOICE

## 2020-02-26 LAB — COPATH REPORT: NORMAL

## 2020-02-28 DIAGNOSIS — Z94.1 TRANSPLANTED HEART (H): Primary | ICD-10-CM

## 2020-02-28 RX ORDER — SIROLIMUS 0.5 MG/1
0.5 TABLET, FILM COATED ORAL DAILY
Qty: 90 TABLET | Refills: 3 | Status: SHIPPED | OUTPATIENT
Start: 2020-02-28 | End: 2020-04-13

## 2020-03-05 ENCOUNTER — TELEPHONE (OUTPATIENT)
Dept: TRANSPLANT | Facility: CLINIC | Age: 55
End: 2020-03-05

## 2020-03-05 NOTE — TELEPHONE ENCOUNTER
Pt called to relay that he is planning on starting the sirolimus on Dontae 3/8. Requests primary coordinator to call him to discuss blood draws and next steps.

## 2020-03-06 ENCOUNTER — TELEPHONE (OUTPATIENT)
Dept: TRANSPLANT | Facility: CLINIC | Age: 55
End: 2020-03-06

## 2020-03-06 DIAGNOSIS — Z94.1 TRANSPLANTED HEART (H): Primary | ICD-10-CM

## 2020-03-06 NOTE — TELEPHONE ENCOUNTER
Pt called to discuss the transition from MMF to rapa. No answer. VM left. Pt still has to complete a UA.

## 2020-03-09 ENCOUNTER — OFFICE VISIT - HEALTHEAST (OUTPATIENT)
Dept: FAMILY MEDICINE | Facility: CLINIC | Age: 55
End: 2020-03-09

## 2020-03-09 ENCOUNTER — TELEPHONE (OUTPATIENT)
Dept: TRANSPLANT | Facility: CLINIC | Age: 55
End: 2020-03-09

## 2020-03-09 DIAGNOSIS — I48.91 ATRIAL FIBRILLATION, UNSPECIFIED TYPE (H): ICD-10-CM

## 2020-03-09 DIAGNOSIS — Z02.89 ENCOUNTER FOR EXAMINATION REQUIRED BY DEPARTMENT OF TRANSPORTATION (DOT): ICD-10-CM

## 2020-03-09 DIAGNOSIS — Z94.1 HEART REPLACED BY TRANSPLANT (H): ICD-10-CM

## 2020-03-09 LAB
ALBUMIN UR-MCNC: ABNORMAL MG/DL
APPEARANCE UR: CLEAR
BACTERIA #/AREA URNS HPF: ABNORMAL HPF
BILIRUB UR QL STRIP: NEGATIVE
COLOR UR AUTO: YELLOW
GLUCOSE UR STRIP-MCNC: NEGATIVE MG/DL
HGB UR QL STRIP: NEGATIVE
KETONES UR STRIP-MCNC: NEGATIVE MG/DL
LEUKOCYTE ESTERASE UR QL STRIP: NEGATIVE
NITRATE UR QL: NEGATIVE
PH UR STRIP: 7 [PH] (ref 5–8)
RBC #/AREA URNS AUTO: ABNORMAL HPF
SP GR UR STRIP: 1.02 (ref 1–1.03)
SQUAMOUS #/AREA URNS AUTO: ABNORMAL LPF
UROBILINOGEN UR STRIP-ACNC: ABNORMAL
WBC #/AREA URNS AUTO: ABNORMAL HPF

## 2020-03-09 ASSESSMENT — MIFFLIN-ST. JEOR: SCORE: 1533.07

## 2020-03-09 NOTE — TELEPHONE ENCOUNTER
Call returned to patient. Questions answered re: UA and instructions reviewed about transitioning from MMF to rapa. Pt encouraged to call with further questions.

## 2020-03-09 NOTE — TELEPHONE ENCOUNTER
Patient has a questions regarding a urine test for his new medication. Patient will be going to his appointment at 10 am.

## 2020-03-10 ENCOUNTER — AMBULATORY - HEALTHEAST (OUTPATIENT)
Dept: LAB | Facility: CLINIC | Age: 55
End: 2020-03-10

## 2020-03-10 DIAGNOSIS — Z79.899 NEED FOR PROPHYLACTIC CHEMOTHERAPY: ICD-10-CM

## 2020-03-10 DIAGNOSIS — Z94.1 HEART REPLACED BY TRANSPLANT (H): ICD-10-CM

## 2020-03-11 ENCOUNTER — AMBULATORY - HEALTHEAST (OUTPATIENT)
Dept: LAB | Facility: CLINIC | Age: 55
End: 2020-03-11

## 2020-03-11 DIAGNOSIS — Z79.899 NEED FOR PROPHYLACTIC CHEMOTHERAPY: ICD-10-CM

## 2020-03-11 DIAGNOSIS — Z94.1 HEART REPLACED BY TRANSPLANT (H): ICD-10-CM

## 2020-03-11 LAB
ALBUMIN UR-MCNC: ABNORMAL MG/DL
APPEARANCE UR: CLEAR
BACTERIA #/AREA URNS HPF: ABNORMAL HPF
BILIRUB UR QL STRIP: NEGATIVE
COLOR UR AUTO: YELLOW
GLUCOSE UR STRIP-MCNC: NEGATIVE MG/DL
HGB UR QL STRIP: NEGATIVE
HYALINE CASTS #/AREA URNS LPF: ABNORMAL LPF
KETONES UR STRIP-MCNC: NEGATIVE MG/DL
LEUKOCYTE ESTERASE UR QL STRIP: NEGATIVE
NITRATE UR QL: NEGATIVE
PH UR STRIP: 6.5 [PH] (ref 5–8)
RBC #/AREA URNS AUTO: ABNORMAL HPF
SP GR UR STRIP: 1.02 (ref 1–1.03)
SQUAMOUS #/AREA URNS AUTO: ABNORMAL LPF
UROBILINOGEN UR STRIP-ACNC: ABNORMAL
WBC #/AREA URNS AUTO: ABNORMAL HPF

## 2020-03-12 ENCOUNTER — VIRTUAL VISIT (OUTPATIENT)
Dept: FAMILY MEDICINE | Facility: OTHER | Age: 55
End: 2020-03-12

## 2020-03-12 ENCOUNTER — TELEPHONE (OUTPATIENT)
Dept: TRANSPLANT | Facility: CLINIC | Age: 55
End: 2020-03-12

## 2020-03-12 NOTE — TELEPHONE ENCOUNTER
Call returned. Pt states he is experiencing cough, headache, and feels he is coming down with something. He has a low grade temp of 99. He is concerned about the coronavirus. Pt instructed to use the [email or web address] portal and potentially go through [facility] to be swabbed. Pt verbalized understanding of the plan and will call with questions or concerns.

## 2020-03-12 NOTE — TELEPHONE ENCOUNTER
Follow up phone call made. Pt awaiting to hear back from nurse from [email or web address]. he expressed frustration with the amount of time its taking. Pt reminded it might take longer than expected due to influx of calls. Pt states symptoms have not gotten worse. He will continue to monitor and call me if symptoms worsen. He will also notify me with plan once he talks to nurse.

## 2020-03-12 NOTE — TELEPHONE ENCOUNTER
Patient Call: General  Route to LPN    Reason for call: PT feels like he might have COVID-19    Call back needed? Yes    Return Call Needed  Same as documented in contacts section  When to return call?: Same day: Route High Priority

## 2020-03-12 NOTE — PROGRESS NOTES
"Date: 2020 11:36:17  Clinician: Slime Love  Clinician NPI: [id number]  Patient: Everton Larios  Patient : 1965  Patient Address: [address] [place] [address], [place] [address]  Patient Phone: [phone]  Visit Protocol: URI  Patient Summary:  Everton is a 54 year old ( : 1965 ) male who initiated a Visit for cold, sinus infection, or influenza. When asked the question \"Please sign me up to receive news, health information and promotions from [employer].\", Everton responded \"No\".    Everton states his symptoms started today.   His symptoms consist of rhinitis, malaise, a cough, nasal congestion, and a headache. He is experiencing difficulty breathing due to nasal congestion but he is not short of breath. Everton also feels feverish.   Symptom details     Nasal secretions: The color of his mucus is clear.    Cough: Everton coughs a few times an hour and his cough is not more bothersome at night. Phlegm comes into his throat when he coughs. He believes his cough is caused by post-nasal drip. The color of the phlegm is clear.     Temperature: His current temperature is 98.5 degrees Fahrenheit.     Headache: He states the headache is mild (1-3 on a 10 point pain scale).      Everton denies having wheezing, ear pain, sore throat, teeth pain, chills, facial pain or pressure, and myalgias. He also denies taking antibiotic medication for the symptoms, having recent facial or sinus surgery in the past 60 days, and having a sinus infection within the past year.   Precipitating events  He has recently been exposed to someone with influenza. Everton has been in close contact with the following high risk individuals: people with asthma, heart disease or diabetes, immunocompromised people, adults 65 or older, pregnant women, and children under the age of 5.   Pertinent COVID-19 (Coronavirus) information  Everton has not traveled internationally in the last 14 days before the start of his symptoms.   Everton has not had close contact with a " laboratory confirmed positive COVID-19 patient within 14 days of symptom onset. 98.5   Pertinent medical history  Everton needs a return to work/school note.   Weight: 155 lbs   Everton does not smoke or use smokeless tobacco.   Additional information as reported by the patient (free text): Heart Transplant 2/24/2019 Immunosuppressed   Weight: 155 lbs    MEDICATIONS: rosuvastatin oral, duloxetine oral, tacrolimus oral, amlodipine besylate (bulk), levothyroxine oral, omeprazole oral, balsalazide oral, mycophenolate mofetil oral, ALLERGIES: NKDA  Clinician Response:  Dear Everton,  Based on the information provided, you have a viral upper respiratory infection, otherwise known as a cold. Symptoms vary from person to person, but can include sneezing, coughing, a runny nose, sore throat, and headache and range from mild to severe.  Unfortunately, there are no medications that can cure a cold, so treatment is focused on controlling symptoms as much as possible. Most people gradually feel better until symptoms are gone in 1-2 weeks.  Medication information  Because you have a viral infection, antibiotics will not help you get better. Treating a viral infection with antibiotics could actually make you feel worse.  Self care  The following tips will keep you as comfortable as possible while you recover:     Rest    Drink plenty of water and other liquids    Take a hot shower to loosen congestion    Take a spoonful of honey to reduce your cough     When to seek care  Please be seen in a clinic or urgent care if new symptoms develop, or symptoms become worse.  COVID-19 (Coronavirus) General Information  We understand it may be concerning to be ill with symptoms that overlap with COVID-19 (Coronavirus) symptoms. Below are some helpful information on COVID-19 (Coronavirus).  How can I protect myself and others from the COVID-19 (Coronavirus)?  Because there is currently no vaccine to prevent infection, the best way to protect yourself  is to avoid being exposed to this virus. The CDC recommends the following additional steps:     Wash your hands often with soap and water for at least 20 seconds, especially after blowing your nose, coughing, or sneezing; going to the bathroom; and before eating or preparing food.  Use an alcohol-based hand  that contains at least 60 percent alcohol if soap and water are not available.        Avoid touching your eyes, nose and mouth with unwashed hands.    Avoid close contact with people who are sick.    Stay home when you are sick.    Cover your cough or sneeze with a tissue, then throw the tissue in the trash.    Clean and disinfect frequently touched objects and surfaces.     You can help stop COVID-19 (Coronavirus) by knowing the signs and symptoms:     Fever    Cough    Shortness of breath     Contact your healthcare provider if   Develop symptoms   AND   Have been in close contact with a person known to have COVID-19 (Coronavirus) or live in or have recently traveled from an area with ongoing spread of COVID-19 (Coronavirus). Call ahead before you go to a doctor's office or emergency room. Tell them about your recent travel and your symptoms.   For the most up to date information, visit the CDC's website.  Steps to help prevent the spread of COVID-19 (Coronavirus) if you are sick  If you are sick with COVID-19 (Coronavirus) or suspect you are infected with the virus that causes COVID-19 (Coronavirus), follow the steps below to help prevent the disease from spreading&nbsp;to people in your home and community.     Stay home except to get medical care. Home isolation may be started in consultation with your healthcare clinician.    Separate yourself from other people and animals in your home.    Call ahead before visiting your doctor if you have a medical appointment.    Wear a facemask when you are around other people.    Cover your cough and sneezes.    Clean your hands often.    Avoid sharing personal  "household items.    Clean and disinfect frequently touched objects and surfaces everyday.    You will need to have someone drop off medications or household supplies (if needed) at your house without coming inside or in contact with you or others living in your house.    Monitor your symptoms and seek prompt medical care if your illness is worsening (e.g. Difficulty breathing).    Discontinue home isolation only in consultation with your healthcare provider.     For more detailed and up to date information on what to do if you are sick, visit this link: What to Do If You Are Sick With Coronavirus Disease 2019 (COVID-19).  Do I need to be tested for COVID-19 (Coronavirus)?     At this time, the limited number of tests available are controlled by the state and local health departments and are being reserved for more seriously ill patients, those with known exposure to confirmed patients, and those with recent travel (within 14 days) to countries with high rates of COVID-19 (Coronavirus).    Decisions on which patients receive testing will be based on the local spread of COVID-19 (Coronavirus) as well as the symptoms. Your healthcare provider will make the final decision on whether you should be tested.    In the meantime, if you have concerns that you may have been exposed, it is reasonable to practice \"social distancing.\"&nbsp; If you are ill with a cold or flu like illness, please monitor your symptoms and reach out to your healthcare provider if your symptoms worsen.    For more up to date information, visit this link: COVID-19 (Coronavirus) Frequently Asked Questions and Answers.      Diagnosis: Viral URI  Diagnosis ICD: J06.9  "

## 2020-03-15 NOTE — PROGRESS NOTES
[facility]       HPI:  Everton Larios is a 54 year old male with a history of ASD (s/p repair in childhood), AFib/AF (s/p ablation), NICM, diastolic dysfunction, alcohol abuse, Pierce's esophagus, ulcerative colitis, GIB (s/p splenic embolization), and hypothyroidism, now s/p OHT and bypass of persistent left SVC to right atrial appendage 2/24/19 who presents to clinic for ongoing evaluation and management.    His postoperative course was c/b shock due to RV dysfunction requiring IABP support, small pneumoperitoneum (clinically insignificant), chest wall hematoma (former ICD site, s/p evacuation and drain placement 3/31/19), HCAP/klebsiella pneumonia, and FRANKLIN (required short-term HD, now recovered).      His biopsy 3/25/19 showed mild AMR1 with some staining for c4d. Repeat biopsy 3/28 showed improved AMR and less reactive capillary staining, and subsequent biopsies have now shown resolution of AMR and improved capillary staining.  Baseline coronary angiogram was previously deferred due to renal function.  His last echocardiogram 12/18/19 showed stable graft function with LVEF 60-65%, normal RV function.     Since his last clinic visit, he has been feeling well overall. He denies any chest pain or pressure, sob/andrade, orthopnea, pnd, palpitations, syncope/presyncope or marielle.  He any denies fever/chills, sore throat, swollen lymph nodes, productive cough, abdominal pain, or N/V/D.  He also denies any problems with his medications and reports compliance.      Past Medical History:   Diagnosis Date     Alcohol abuse      Pierce's esophagus 10/4/2018     Chronic rhinitis 10/4/2018     Chronic systolic heart failure (H) 10/4/2018     Depression 10/4/2018     Diastolic dysfunction      DJD (degenerative joint disease) - neck 10/4/2018     H/O congenital atrial septal defect (ASD) repair at age 3 10/4/2018     Hypothyroidism due to medication 10/4/2018     ICD, STEERads  2008; gen change 2/2018 10/4/2018     Intermittent asthma without complication 10/4/2018     Nonischemic cardiomyopathy (H) 10/4/2018     On amiodarone therapy 10/4/2018     Paroxysmal atrial fibrillation (H) 10/4/2018     S/P ablation of atrial fibrillation 10/4/2018     Systolic heart failure (H)      Ulcerative colitis (H) 2005     Ventricular tachycardia (H) 10/4/2018       Past Surgical History:   Procedure Laterality Date     AICD, DUAL CHAMBER       BRONCHOSCOPY (RIGID OR FLEXIBLE), DIAGNOSTIC N/A 4/30/2019    Procedure: BRONCHOSCOPY, WITH BAL;  Surgeon: Margot Chiang MD;  Location:  GI     COLONOSCOPY N/A 2/20/2020    Procedure: COLONOSCOPY, WITH POLYPECTOMY AND BIOPSY;  Surgeon: Ranjeet Cooper MD;  Location:  GI     CV CORONARY ANGIOGRAM N/A 2/12/2020    Procedure: CV CORONARY ANGIOGRAM;  Surgeon: Isiah Mcallister MD;  Location:  HEART CARDIAC CATH LAB     CV HEART BIOPSY N/A 3/4/2019    Procedure: Heart Biopsy;  Surgeon: Abhijeet Titus MD;  Location:  HEART CARDIAC CATH LAB     CV HEART BIOPSY N/A 3/25/2019    Procedure: Heart Biopsy;  Surgeon: Yves Rodrigues MD;  Location:  HEART CARDIAC CATH LAB     CV HEART BIOPSY N/A 3/28/2019    Procedure: Heart Cath Heart Biopsy;  Surgeon: Yves Rodrigues MD;  Location:  HEART CARDIAC CATH LAB     CV HEART BIOPSY N/A 4/10/2019    Procedure: CV HEART BIOPSY;  Surgeon: Isiah Mcallister MD;  Location:  HEART CARDIAC CATH LAB     CV HEART BIOPSY N/A 4/24/2019    Procedure: CV HEART BIOPSY;  Surgeon: Isiah Mcallister MD;  Location:  HEART CARDIAC CATH LAB     CV HEART BIOPSY N/A 5/8/2019    Procedure: CV HEART BIOPSY;  Surgeon: Isiah Mcallister MD;  Location:  HEART CARDIAC CATH LAB     CV HEART BIOPSY N/A 6/4/2019    Procedure: CV HEART BIOPSY;  Surgeon: Alton Solomon MD;  Location:  HEART CARDIAC CATH LAB     CV HEART BIOPSY N/A 5/22/2019    Procedure: CV HEART BIOPSY;  Surgeon: Ravi  MD Yves;  Location:  HEART CARDIAC CATH LAB     CV HEART BIOPSY N/A 7/17/2019    Procedure: CV HEART BIOPSY;  Surgeon: Isiah Mcallister MD;  Location:  HEART CARDIAC CATH LAB     CV HEART BIOPSY N/A 8/13/2019    Procedure: CV HEART BIOPSY;  Surgeon: Jackson Patten MD;  Location:  HEART CARDIAC CATH LAB     CV HEART BIOPSY N/A 10/15/2019    Procedure: CV HEART BIOPSY;  Surgeon: Santino Mckeon MD;  Location:  HEART CARDIAC CATH LAB     CV HEART BIOPSY N/A 2/12/2020    Procedure: CV HEART BIOPSY;  Surgeon: Isiah Mcallister MD;  Location:  HEART CARDIAC CATH LAB     CV INTRA-AORTIC BALLOON PUMP INSERTION N/A 2/18/2019    Procedure: Intra-Aortic Balloon;  Surgeon: Alton Solomon MD;  Location:  HEART CARDIAC CATH LAB     CV INTRA-AORTIC BALLOON PUMP INSERTION N/A 2/20/2019    Procedure: Replace subclavian IABP;  Surgeon: Yves Rodrigues MD;  Location:  HEART CARDIAC CATH LAB     CV INTRAVASULAR ULTRASOUND N/A 2/12/2020    Procedure: CV INTRAVASCULAR ULTRASOUND;  Surgeon: Isiah Mcallister MD;  Location:  HEART CARDIAC CATH LAB     CV LEFT HEART CATH N/A 3/19/2019    Procedure: Left Heart Cath;  Surgeon: Yves Rodrigues MD;  Location:  HEART CARDIAC CATH LAB     CV LEFT HEART CATH N/A 2/12/2020    Procedure: Left Heart Cath;  Surgeon: Isiah Mcallister MD;  Location:  HEART CARDIAC CATH LAB     CV RIGHT HEART CATH N/A 3/19/2019    Procedure: RHC/HBx - Femoral access for 3/19 per Nimisha GONZALEZ;  Surgeon: Yves Rodrigues MD;  Location:  HEART CARDIAC CATH LAB     CV RIGHT HEART CATH N/A 3/25/2019    Procedure: Right Heart Cath;  Surgeon: Yves Rodrigues MD;  Location:  HEART CARDIAC CATH LAB     CV RIGHT HEART CATH N/A 3/28/2019    Procedure: Heart Cath Right Heart Cath;  Surgeon: Yves Rodrigues MD;  Location:  HEART CARDIAC CATH LAB     CV RIGHT HEART CATH N/A 4/24/2019    Procedure: CV RIGHT HEART CATH;  Surgeon: Isiah Mcallister MD;   Location:  HEART CARDIAC CATH LAB     CV RIGHT HEART CATH N/A 3/11/2019    Procedure: ADD ON RHC/HBX;  Surgeon: Alton Solomon MD;  Location:  HEART CARDIAC CATH LAB     CV RIGHT HEART CATH N/A 6/4/2019    Procedure: CV RIGHT HEART CATH;  Surgeon: Alton Solomon MD;  Location:  HEART CARDIAC CATH LAB     CV RIGHT HEART CATH N/A 5/22/2019    Procedure: CV RIGHT HEART CATH;  Surgeon: Yves Rodrigues MD;  Location:  HEART CARDIAC CATH LAB     CV RIGHT HEART CATH N/A 8/13/2019    Procedure: CV RIGHT HEART CATH;  Surgeon: Jackson Patten MD;  Location:  HEART CARDIAC CATH LAB     CV RIGHT HEART CATH N/A 10/15/2019    Procedure: CV RIGHT HEART CATH;  Surgeon: Santino Mckeon MD;  Location:  HEART CARDIAC CATH LAB     CV RIGHT HEART CATH N/A 2/12/2020    Procedure: CV RIGHT HEART CATH;  Surgeon: Isiah Mcallister MD;  Location:  HEART CARDIAC CATH LAB     INCISION AND DRAINAGE CHEST WASHOUT, COMBINED N/A 3/21/2019    Procedure: Incision And Drainage; Evacuation Right Chest Wall Hematoma;  Surgeon: Dewayne House MD;  Location: [facility]     INSERT INTRAAORTIC BALLOON PUMP Left 2/19/2019    Procedure: Insert left  Subclavian Balloon Pump,  Removal Right femoral arterial balloom pump sheath;  Surgeon: Dewayne House MD;  Location: [facility]     INSERT INTRAAORTIC BALLOON PUMP Left 2/21/2019    Procedure: SUBCLAVIAN BALLOON PUMP PLACEMENT;  Surgeon: Ben White MD;  Location: [facility]     IR PICC PLACEMENT > 5 YRS OF AGE  5/8/2019     TRANSPLANT HEART RECIPIENT N/A 2/24/2019    Procedure: TRANSPLANT HEART RECIPIENT;  Surgeon: Dewayne House MD;  Location: [facility]       Family History   Problem Relation Age of Onset     Endometrial Cancer Mother      Osteoporosis Mother      Hypertension Father      Endometrial Cancer Maternal Grandmother      Hip fracture No family hx of      Nephrolithiasis No family hx of        Social History     Tobacco Use     Smoking  status: Former Smoker     Packs/day: 0.00     Years: 10.00     Pack years: 0.00     Start date: 1980     Last attempt to quit: 2008     Years since quittin.2     Smokeless tobacco: Never Used   Substance Use Topics     Alcohol use: Yes     Alcohol/week: 1.0 - 2.0 standard drinks     Types: 1 - 2 Cans of beer per week     Frequency: Monthly or less     Drinks per session: 1 or 2     Binge frequency: Less than monthly       acetaminophen (TYLENOL) 325 MG tablet, Take 2 tablets (650 mg) by mouth every 4 hours as needed for mild pain  albuterol (PROAIR HFA/PROVENTIL HFA/VENTOLIN HFA) 108 (90 Base) MCG/ACT inhaler, Inhale 2 puffs into the lungs every 6 hours as needed for shortness of breath / dyspnea or wheezing  alendronate (FOSAMAX) 70 MG tablet, Take 1 tablet (70 mg) by mouth every 7 days Take 60 minutes before am meal with 8 oz. water. Remain upright for 30 minutes.  amLODIPine (NORVASC) 5 MG tablet, TAKE 1 TABLET BY MOUTH EVERY DAY  aspirin (ASA) 81 MG chewable tablet, Take 1 tablet (81 mg) by mouth daily  balsalazide (COLAZAL) 750 MG capsule, Take 5 capsules in AM and 4 capsules in PM  calcium carbonate 600 mg-vitamin D 400 units (CALTRATE) 600-400 MG-UNIT per tablet, Take 1 tablet by mouth 2 times daily (with meals)  cetirizine (ZYRTEC) 10 MG tablet, Take 10 mg by mouth daily  DULoxetine (CYMBALTA) 20 MG EC capsule, Take 20 mg by mouth daily  fluticasone (FLOVENT HFA) 220 MCG/ACT Inhaler, Inhale 2 puffs into the lungs 2 times daily  levothyroxine (SYNTHROID/LEVOTHROID) 100 MCG tablet, MON to SAT 1 tablet/day;SUN 0.5 tablet  magnesium oxide (MAG-OX) 400 MG tablet, Take 1 tablet (400 mg) by mouth 2 times daily (Patient not taking: Reported on 2019)  melatonin 3 MG tablet, Take 2 tablets (6 mg) by mouth At Bedtime (Patient taking differently: Take 5 mg by mouth At Bedtime )  mesalamine (ASACOL HD) 800 MG EC tablet, Take 1 tablet (800 mg) by mouth 3 times daily  multivitamin w/minerals  "(THERA-VIT-M) tablet, Take 1 tablet by mouth daily  mycophenolate (GENERIC EQUIVALENT) 250 MG capsule, Take 6 capsules (1,500 mg) by mouth 2 times daily  omeprazole (PRILOSEC) 40 MG DR capsule, Take 1 capsule (40 mg) by mouth daily  ondansetron (ZOFRAN-ODT) 4 MG ODT tab, DISSOLVE 1 TABLET ON THE TONGUE EVERY EIGHT HOURS AS NEEDED  polyethylene glycol (MIRALAX/GLYCOLAX) powder, Take 1 capful by mouth daily  rosuvastatin (CRESTOR) 10 MG tablet, TAKE 1 TABLET BY MOUTH EVERY DAY  senna (SENOKOT) 8.6 MG tablet, Take 2 tablets by mouth 2 times daily as needed for constipation  tacrolimus (GENERIC EQUIVALENT) 0.5 MG capsule, Take one capsule (0.5 mg) in am with three (1 mg)  to equal 3.5 mg.  tacrolimus (GENERIC EQUIVALENT) 1 MG capsule, Take 3 capsules (3 mg) by mouth 2 times daily    No current facility-administered medications on file prior to visit.       ROS:   10 point ROS negative other than what is discussed above.    EXAM:   /83 (BP Location: Left arm, Patient Position: Chair, Cuff Size: Adult Regular)   Pulse 104   Ht 1.727 m (5' 8\")   Wt 73 kg (161 lb)   SpO2 99%   BMI 24.48 kg/m     GENERAL: Alert, oriented, NAD  HEENT:  NC/AT. Sclerae white.  MMM, no oral lesions  NECK: JVP <6 cm  HEART: RRR, normal S1 and S2, no murmurs. 2+ bilateral peripheral pulses.  LUNGS:  Clear to auscultation bilaterally, no wheezes, rales, or rhonchi  ABDOMEN: Soft, nontender, nondistended, bowel sounds present, no ascites.  EXTREMITIES: No lower extremity edema.    Labs:    CBC RESULTS:  Lab Results   Component Value Date    WBC 2.1 (L) 02/12/2020    RBC 4.16 (L) 02/12/2020    HGB 10.5 (L) 02/12/2020    HCT 34.2 (L) 02/12/2020    MCV 82 02/12/2020    MCH 25.2 (L) 02/12/2020    MCHC 30.7 (L) 02/12/2020    RDW 13.6 02/12/2020     02/12/2020       CMP RESULTS:  Lab Results   Component Value Date     02/12/2020    POTASSIUM 4.7 02/12/2020    CHLORIDE 110 (H) 02/12/2020    CO2 22 02/12/2020    ANIONGAP 8 02/12/2020 "    GLC 85 02/12/2020    BUN 24 02/12/2020    CR 1.29 (H) 02/12/2020    GFRESTIMATED 61 02/12/2020    GFRESTBLACK 71 02/12/2020    RADAMES 8.7 02/12/2020    BILITOTAL 0.5 02/12/2020    ALBUMIN 3.9 02/12/2020    ALKPHOS 226 (H) 02/12/2020    ALT 21 02/12/2020    AST 17 02/12/2020        INR RESULTS:  Lab Results   Component Value Date    INR 1.17 (H) 09/23/2019       Lab Results   Component Value Date    MAG 2.0 02/12/2020     Lab Results   Component Value Date    NTBNPI 767 09/23/2019     Lab Results   Component Value Date    NTBNP 10,986 (H) 11/15/2018     RHC and angiography 2/12/2020  Pressures Phase   Time  Systolic  Diastolic  Mean  A Wave  V Wave  EDP  Max dp/dt  HR    RA Pressures  10:37 AM    7 mmHg     9 mmHg     7 mmHg       95 bpm       RV Pressures  10:37 AM  30 mmHg         9 mmHg      95 bpm       PA Pressures  10:38 AM  30 mmHg     16 mmHg     20 mmHg         94 bpm       PCW Pressures  10:38 AM    12 mmHg     13 mmHg     13 mmHg       95 bpm       AO Pressures  11:00 AM  94 mmHg     72 mmHg     83 mmHg         98 bpm       Art Pressures  10:59 AM  120 mmHg     74 mmHg     91 mmHg         103 bpm       LV Pressures  10:59 AM  120 mmHg         17 mmHg      102 bpm          Time  Hb  SAT(%)  PO2  Content  PA Sat    PA  10:21 AM   71.5 %       71.5 %       Art  10:21 AM   100 %      14.28 mL/dL        Cardiac Output    Time  TDCO  TDCI  Jaden C.O.  Jaden C.I.  Jaden HR    Cardiac Output Results  10:21 AM  6.77 L/min     3.74 L/min/m2     5.68 L/min     3.13 L/min/m2            Left Main    The vessel was visualized by selective angiography and is large. There was 0% vessel disease.    Left Anterior Descending    The vessel was visualized by selective angiography and is large. There was 0% vessel disease. IVUS was performed on the LMCA and LAD vessels. The LMCA was engaged with a 6 Fr Ikari LF 3.5 GC and the patient was anticoagulated with IV UFH. A BMW was passed into the distal LAD without difficulty. The [facility] IVUS was passed over the wire and manually pulled back while recording. Proximal LAD HERACLIO 0.3 Lumen 24.1 Vessel 29.8 19% area stenosis Mid LAD HERACLIO 0.3 Lumen 10.8 Vessel 13.7 21% area stenosis IVUS was performed on the vessel. Ultrasound supply: [facility] IVUS SHRT.    Left Circumflex    The vessel was visualized by selective angiography and is moderate in size. There was 0% vessel disease.    Right Coronary Artery    The vessel was visualized by selective angiography and is large. There was 0% vessel disease.    Conclusion    Mild intimal hyperplasia with up to 0.3 mm HERACLIO and 20% narrowing by area.    ECHO 12/18/2019  Interpretation Summary  Left ventricular function, chamber size, wall motion, and wall thickness are  normal.The EF is 60-65%.  Right ventricular function, chamber size, wall motion, and thickness are  normal.  No significant valvular abnormalities were noted.  Pulmonary artery systolic pressure is normal.  The inferior vena cava was normal in size with preserved respiratory  variability.  No pericardial effusion is present.      RHC and EMB 10/15/19:  Conclusion       Right sided filling pressures are normal.    Left sided filling pressures are normal.    Normal PA pressures.    Normal cardiac output level.    Successful collection of endomyocardial biopsies          Plan       Follow bedrest per protocol    Continued medical management and lifestyle modifications for cardiovascular risk factor optimizations.    Discharge today per protocol   Hemodynamics     Right Heart Pressures     Right sided filling pressures are normal.Left sided filling pressures are normal. Normal PA pressures.Normal cardiac output level.   Heart Biospy     Heart Biopsy     After informed consent patient was prepped and draped in the usual fashion. Under fluoroscopy guidance a 7 Fr angeled sheath was placed into the right internal jugular vein after local anesthesia with 1.0% lidocaine. 5.5 [employer] bioptome  was passed through the sheath to the right ventricular septum and 5 biopsies were obtained. The biopsy specimens were sent to Pathology for examination. The sheath was removed and hemostasis was obtained. The patient tolerated the procedure well and there were no complications.   Pressures Phase: Baseline      Time Systolic Diastolic Mean A Wave V Wave EDP Max dp/dt HR   RA Pressures 11:49 AM   2 mmHg    3 mmHg    4 mmHg      105 bpm      RV Pressures 11:35 AM       1776 mmHg/sec        11:49 AM 22 mmHg        4 mmHg     105 bpm      PA Pressures 11:50 AM 20 mmHg    10 mmHg    14 mmHg        105 bpm      PCW Pressures 11:49 AM   6 mmHg    7 mmHg    7 mmHg      105 bpm      Blood Flow Results Phase: Baseline      Time Results Indexed Values   QP 11:35 AM 5.92 L/min    3.66 L/min/m2      QS 11:35 AM 5.92 L/min    3.66 L/min/m2      Blood Oximetry Phase: Baseline      Time Hb SAT(%) PO2 Content PA Sat   PA 11:35 AM  66.6 %      66.6 %      Art 11:35 AM  100 %     10.74 mL/dL       Cardiac Output Phase: Baseline      Time TDCO TDCI Jaden C.O. Jaden C.I. Jaden HR   Cardiac Output Results 11:35 AM 6.03 L/min    3.74 L/min/m2    5.92 L/min    3.66 L/min/m2        11:51 AM 6.03 L/min          Resistance Results Phase: Baseline      Time PVR SVR PVR-I SVR-I TPR TVR TPR-I TVR-I PVR/SVR TPR/TVR   Resistance Results (Metric) 11:35 .64 dsc-5     172.25 dsc-5/m2     186.62 dsc-5     301.45 dsc-5/m2         Resistance Results (Wood) 11:35 AM 1.33 MARTIN     2.15 MARTIN/m2     2.33 MARTIN     3.77 MARTIN/m2         Stoke Volume Results Phase: Baseline      Time RVSW LVSW RVSW-I LVSW-I   Stroke Work Results 11:35 AM 9.33 gm*m     5.77 gm*m/m2             TTE 10/15/19:  Interpretation Summary  Left ventricular function, chamber size, wall motion, and wall thickness are  normal.The EF is 60-65%. No regional wall motion abnormalities are seen.  Right ventricular function, chamber size, wall motion, and thickness are  normal.  Trace tricuspid  insufficiency is present. Pulmonary artery systolic pressure  is normal. The inferior vena cava was normal in size with preserved  respiratory variability. No pericardial effusion is present.     There has been no change.        Assessment and Plan:   Everton Larios is a 54 year old male with a history of ASD (s/p repair in childhood), AFib/AF (s/p ablation), NICM, diastolic dysfunction, alcohol abuse, Pierce's esophagus, ulcerative colitis, GIB (s/p splenic embolization), and hypothyroidism, now s/p OHT and bypass of persistent left SVC to right atrial appendage 2/24/19 who presents for ongoing evaluation and management.    NICM, s/p OHT and bypass of persistent left SVC to right atrial appendage 2/24/19  His postoperative course was c/b shock due to RV dysfunction requiring IABP support, small pneumoperitoneum (clinically insignificant), chest wall hematoma (former ICD site, s/p evacuation and drain placement 3/31/19), HCAP/klebsiella pneumonia, and FRANKLIN (required short-term HD, now recovered).  He was transferred to  rehab 4/3, and ultimately discharged to his local residence 4/15.     His biopsy 3/25/19 showed mild AMR1 with some staining for c4d.  Repeat biopsy 3/28 showed improved AMR and less reactive capillary staining, and subsequent biopsies have now shown resolution of AMR and improved capillary staining. Baseline coronary angiogram was previously deferred due to renal function.  His last echocardiogram 10/15/19 showed stable graft function with LVEF 60-65%, normal RV function, and trace TR.     He was hospitalized 4/28-5/9/19 with fevers, URI symptoms, abdominal pain, and diarrhea, and was found to be neutropenic with pseudomonas bacteremia and HCAP.  Chest CT showed diffuse bilateral solid with peripheral groundglass pulmonary nodules/opacities and mediastinal adenopathy, BAL grew pseudomonas, and fungitell was positive.  Transplant ID was consulted, and he was treated with 4 weeks of IV cefepime  and micafungin.  Repeat CT 5/22 showed significantly decreased bilateral nodular and consolidative opacities.    He was then hospitalized from 9/23-9/28/19 with CMV viremia and C diff colitis. Had diarrhea. 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 Did not have a colonoscopy.    Today patient is euvolemic by history and exam.  Overall doing well.  His RHC confirmed normal hemodynamics but angiogram noted mild CAV.     Serostatus:  - CMV D+/R+  - EBV D+/R+  - Toxo D-/R-     Immunosuppression:  - tacrolimus, goal level 6-8.  Tacrolimus level today pedning. Dosing per coordinator   - MMF 1500mg twice daily - given that he just filled current script will continue until uses this supply of MMF and then will transition to mtor inhibitor (everolimus preferred) with adjustment of tacrolimus dosing as per protocol given CAV on angiogram today.       Graft function:  - BPs:  Stable, remain <140/90.  Continue amlodipine 5mg daily.  - HRs:  Stable, remain >80  - fluid status:  Euvolemic, off diuretics.        PPx:  - CAV:  Aspirin 81mg daily and rosuvastatin 10mg daily  - GERD:  Omeprazole (note h/o carrera's esophagus)  - CMV:  History of CMV viremia. Patient is unable to afford valcyte. Dr. Shukla aware. No further need for weekly CMV levels,will follow-up today's testing  - Osteoporosis:  Calcium/vitamin D supplements        Ulcerative Colitis  - Followed by GI.  Scheduled for colonoscopy later this month      RTC:  One month after transitioning to mtor based regimen will repeat RHC and biopsy (fely at ~15months post-transplant).  Will also obtain cardiac MRI at that time as patient did not obtain cardiac MRI with annual testing.        Corinna Donis MD  Section Head - Advanced Heart Failure, Transplantation and Mechanical Circulatory Support  Director - [facility]  Associate Professor of Medicine, [facility]

## 2020-03-17 DIAGNOSIS — Z94.1 TRANSPLANTED HEART (H): Primary | ICD-10-CM

## 2020-03-19 ENCOUNTER — VIRTUAL VISIT (OUTPATIENT)
Dept: FAMILY MEDICINE | Facility: OTHER | Age: 55
End: 2020-03-19

## 2020-03-19 NOTE — PROGRESS NOTES
"Date: 2020 13:25:20  Clinician: Fely Lorenzo  Clinician NPI: [id number]  Patient: Everton Larios  Patient : 1965  Patient Address: [address] [place] [address], [place] [address]  Patient Phone: [phone]  Visit Protocol: URI  Patient Summary:  Everton is a 54 year old ( : 1965 ) male who initiated a Visit for COVID-19 (Coronavirus) evaluation and screening. When asked the question \"Please sign me up to receive news, health information and promotions from [employer].\", Everton responded \"No\".    Everton states his symptoms started gradually 7-9 days ago. After his symptoms started, they improved and then got worse again.   His symptoms consist of a sore throat, a cough, nasal congestion, malaise, a headache, ear pain, rhinitis, enlarged lymph nodes, myalgia, chills, and tooth pain. Everton also feels feverish.   Symptom details     Nasal secretions: The color of his mucus is clear.    Cough: Everton coughs a few times an hour and his cough is not more bothersome at night. Phlegm comes into his throat when he coughs. He does not believe his cough is caused by post-nasal drip. The color of the phlegm is clear.     Sore throat: Everton reports having moderate throat pain (4-6 on a 10 point pain scale), does not have exudate on his tonsils, and can swallow liquids. The lymph nodes in his neck are enlarged. A rash has not appeared on the skin since the sore throat started.     Temperature: His current temperature is 100.0 degrees Fahrenheit.     Headache: He states the headache is mild (1-3 on a 10 point pain scale).     Tooth pain: The tooth pain is not caused by a cavity, recent dental work, or other mouth problems.      Everton denies having facial pain or pressure and wheezing. He also denies having a sinus infection within the past year, having recent facial or sinus surgery in the past 60 days, and taking antibiotic medication for the symptoms. He is not experiencing dyspnea.   Precipitating events  Everton is not sure " if he has been exposed to someone with strep throat. He has recently been exposed to someone with influenza. Everton has been in close contact with the following high risk individuals: children under the age of 5, people with asthma, heart disease or diabetes, adults 65 or older, pregnant women, and immunocompromised people.   Pertinent COVID-19 (Coronavirus) information  Everton has not traveled internationally or to the areas where COVID-19 (Coronavirus) is widespread, including cruise ship travel in the last 14 days before the start of his symptoms.   Everton has not had a close contact with a laboratory-confirmed COVID-19 patient within 14 days of symptom onset. He has had a close contact with a suspected COVID-19 patient within 14 days of symptom onset. Additional information about contact with COVID-19 (Coronavirus) patient as reported by the patient (free text): I am a heart transplant patient (2/24/2019) and therefor immunosuppressed. 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 There is no telling what I've been exposed to,   Everton is not a healthcare worker and does not work in a healthcare facility.   Pertinent medical history  Everton needs a return to work/school note.   Weight: 155 lbs   Everton does not smoke or use smokeless tobacco.   Weight: 155 lbs  A synchronous phone visit was initiated by the provider for the following reason: heart transplant, high risk patient, immunocompromised, just want to assess fully his symptoms &amp; triage him to ER vs not if appropriate    MEDICATIONS: sirolimus oral, rosuvastatin oral, duloxetine oral, tacrolimus oral, amlodipine besylate (bulk), levothyroxine oral, omeprazole oral, balsalazide oral, ALLERGIES: NKDA  Clinician Response:  Dear Everton,   Based on the information you have provided, you do have symptoms that are consistent with Coronavirus (COVID-19).  The coronavirus causes mild to severe respiratory illness with the most common symptoms including  fever, cough and difficulty breathing. Unfortunately, many viruses cause similar symptoms and it can be difficult to distinguish between viruses, especially in mild cases, so we are presuming that anyone with cough or fever has coronavirus at this time.  Coronavirus/COVID-19 has reached the point of community spread in [place], meaning that we are finding the virus in people with no known exposure risk for alana the virus. Given the increasing commonness of coronavirus in the community we are no longer testing patients who are not critically ill.  If you are a health care worker, you should refer to your employee health office for instructions about returning to work.  For everyone else who has cough or fever, you should assume you are infected with coronavirus. Accordingly, you should self-quarantine for seven days from the first day your symptoms started OR 72 hours after your cough and fever completely resolve - WHICHEVER is LONGER. You should call if you find increasing shortness of breath, wheezing or sustained fever above 101.5. If you are significantly short of breath or experience chest pain you should call 911 or report to the nearest emergency department for urgent evaluation.    Isolate yourself at home.   Do Not allow any visitors  Do Not go to work or school  Do Not go to [employer],  centers, shopping, or other public places.  Do Not shake hands.  Avoid close contact with others (hugging, kissing).   Protect Others:    Cover Your Mouth and Nose with a mask, disposable tissue or wash cloth to avoid spreading germs to others.  Wash your hands and face frequently with soap and water.   If you develop significant shortness of breath that prevents you from doing normal activities, please call 911 or proceed to the nearest emergency room and alert them immediately that you have been in self-isolation for possible coronavirus.   For more information about COVID19 and options for caring for  yourself at home, please visit the CDC website at https://www.cdc.gov/coronavirus/2019-ncov/about/steps-when-sick.htmlFor more options for care at [facility], please visit our website at [email or web address]       Medication information  Because you have a viral infection, antibiotics will not help you get better. Treating a viral infection with antibiotics could actually make you feel worse.  Unless you are allergic to the over-the-counter medication(s) below, I recommend using:    Acetaminophen (Tylenol or store brand) oral tablet. Take 1-2 tablets by mouth every 4-6 hours to help with the discomfort.   An antihistamine such as Benadryl, Claritin, or store brand.  A decongestant such as Sudafed PE or store brand.  Guaifenesin + dextromethorphan (Robitussin DM, Mucinex DM, or store brand).  Saline nasal spray (Clare or store brand). Use 1-2 sprays in each nostril 3 times a day as needed for congestion.  Oxymetazoline (Afrin or store brand) nasal spray. Use 1 spray in each nostril 2 times a day for a maximum of 3 days. Using this medication more frequently or longer than recommended may cause nasal congestion to reoccur or worsen. Sinus pressure occurs when the tissues lining your sinuses become swollen and inflamed. Afrin nasal spray decreases the swelling to provide the quickest and most effective relief from sinus pressure.   Over-the-counter medications do not require a prescription. Ask the pharmacist if you have any questions.  Self care  The following tips will keep you as comfortable as possible while you recover:   Rest, Take naps, Avoid vigorous physical activity  Drink plenty of water and other liquids  Use throat lozenges  Gargle with warm salt water (1/4 teaspoon of salt per 8 ounce glass of water)  Suck on frozen items such as popsicles or ice cubes  Drink hot tea with lemon and honey  Take a spoonful of honey to reduce your cough       Diagnosis: Cough  Diagnosis  ICD: R05  Triage Notes: I reviewed the patient's history, verified their identity, and explained the Visit process.    Last week - was having headaches, Saturday / Sunday feeling better, went back to work Monday. However, today Thursday // having some pain below his ear / jaw (has TMJ, get's pain in that area from grinding, had been wearing a protector / made things worse). R sided pain / TMJ. An hour later into work this morning his TMJ / jaw pain hurt. Started feeling around R gland is feeling swallowing, harder to swallow. Left work at 12:30, temperature was 100 even, usually 97-98 normally (following it over the weekend). Has a bit of a headache now didn't have this AM.     Having an intermittent cough / similar to last week. No sinus tenderness / pain. No troubles breathing. No chest congestion at this time. However some upper respiratory tightness. Feeling like his symptoms are coming / going. Works at [employer], exposed to general public via [employer], now moved out just this week, still out on the floor however.    I spoke to Everton at length. His symptoms are still mild. He's not having chest pain / heaviness / shortness of breath / wheezing / leg swelling / arm pain. His jaw pain comes and goes and is mild, and might be related to his TMJ // it feels very similar. He denies nausea / diarrhea / GI upset. I told him I am not a specialist and would recommend he consult with his transplant physician (he has already reached out to his transplant care coordinator). I told him in light of his symptoms and nature of his work I would recommend quarantine for now. We discussed things that would be concerning & when I would recommend ER visit & hopefully in the next couple days acute care / urgent care visit.     During our chat he was eating a sandwich, no difficulty chewing, no mouth pain, no dysphagia reported. No changes in appetite.  Synchronous Triage: phone, status: completed, duration: 648 seconds

## 2020-03-20 ENCOUNTER — TELEPHONE (OUTPATIENT)
Dept: TRANSPLANT | Facility: CLINIC | Age: 55
End: 2020-03-20

## 2020-03-20 NOTE — TELEPHONE ENCOUNTER
Pt called stating he is experiencing cough, sore throat, and swollen lymph node. No fever, no shortness of breath. He went through [email or web address] portal and they are suggesting a 7 day quarantine. [facility]. Pt encouraged to call with any questions or concerns.

## 2020-03-31 DIAGNOSIS — Z94.1 TRANSPLANTED HEART (H): ICD-10-CM

## 2020-03-31 LAB
ALBUMIN UR-MCNC: 100 MG/DL
ANISOCYTOSIS BLD QL SMEAR: SLIGHT
APPEARANCE UR: CLEAR
BASOPHILS # BLD AUTO: 0.1 10E9/L (ref 0–0.2)
BASOPHILS NFR BLD AUTO: 2 %
BILIRUB UR QL STRIP: NEGATIVE
COLOR UR AUTO: YELLOW
DIFFERENTIAL METHOD BLD: ABNORMAL
EOSINOPHIL # BLD AUTO: 0.1 10E9/L (ref 0–0.7)
EOSINOPHIL NFR BLD AUTO: 4 %
ERYTHROCYTE [DISTWIDTH] IN BLOOD BY AUTOMATED COUNT: 14 % (ref 10–15)
GLUCOSE UR STRIP-MCNC: NEGATIVE MG/DL
HCT VFR BLD AUTO: 33 % (ref 40–53)
HGB BLD-MCNC: 10.3 G/DL (ref 13.3–17.7)
HGB UR QL STRIP: NEGATIVE
KETONES UR STRIP-MCNC: NEGATIVE MG/DL
LEUKOCYTE ESTERASE UR QL STRIP: NEGATIVE
LYMPHOCYTES # BLD AUTO: 2 10E9/L (ref 0.8–5.3)
LYMPHOCYTES NFR BLD AUTO: 70 %
MCH RBC QN AUTO: 24.6 PG (ref 26.5–33)
MCHC RBC AUTO-ENTMCNC: 31.2 G/DL (ref 31.5–36.5)
MCV RBC AUTO: 79 FL (ref 78–100)
MONOCYTES # BLD AUTO: 0.4 10E9/L (ref 0–1.3)
MONOCYTES NFR BLD AUTO: 12 %
NEUTROPHILS # BLD AUTO: 0.4 10E9/L (ref 1.6–8.3)
NEUTROPHILS NFR BLD AUTO: 12 %
NITRATE UR QL: NEGATIVE
NON-SQ EPI CELLS #/AREA URNS LPF: ABNORMAL /LPF
PH UR STRIP: 7 PH (ref 5–7)
PLATELET # BLD AUTO: 258 10E9/L (ref 150–450)
POIKILOCYTOSIS BLD QL SMEAR: SLIGHT
RBC # BLD AUTO: 4.18 10E12/L (ref 4.4–5.9)
RBC #/AREA URNS AUTO: ABNORMAL /HPF
SOURCE: ABNORMAL
SP GR UR STRIP: 1.01 (ref 1–1.03)
UROBILINOGEN UR STRIP-ACNC: 0.2 EU/DL (ref 0.2–1)
WBC # BLD AUTO: 3 10E9/L (ref 4–11)
WBC #/AREA URNS AUTO: ABNORMAL /HPF

## 2020-03-31 PROCEDURE — 85025 COMPLETE CBC W/AUTO DIFF WBC: CPT | Performed by: INTERNAL MEDICINE

## 2020-03-31 PROCEDURE — 80048 BASIC METABOLIC PNL TOTAL CA: CPT | Performed by: INTERNAL MEDICINE

## 2020-03-31 PROCEDURE — 81001 URINALYSIS AUTO W/SCOPE: CPT | Performed by: INTERNAL MEDICINE

## 2020-03-31 PROCEDURE — 80195 ASSAY OF SIROLIMUS: CPT | Performed by: INTERNAL MEDICINE

## 2020-03-31 PROCEDURE — 36415 COLL VENOUS BLD VENIPUNCTURE: CPT | Performed by: INTERNAL MEDICINE

## 2020-04-01 LAB
ANION GAP SERPL CALCULATED.3IONS-SCNC: 4 MMOL/L (ref 3–14)
BUN SERPL-MCNC: 25 MG/DL (ref 7–30)
CALCIUM SERPL-MCNC: 8.4 MG/DL (ref 8.5–10.1)
CHLORIDE SERPL-SCNC: 107 MMOL/L (ref 94–109)
CO2 SERPL-SCNC: 24 MMOL/L (ref 20–32)
CREAT SERPL-MCNC: 1.1 MG/DL (ref 0.66–1.25)
GFR SERPL CREATININE-BSD FRML MDRD: 74 ML/MIN/{1.73_M2}
GLUCOSE SERPL-MCNC: 80 MG/DL (ref 70–99)
POTASSIUM SERPL-SCNC: 4.6 MMOL/L (ref 3.4–5.3)
SIROLIMUS BLD-MCNC: 3.2 UG/L (ref 5–15)
SODIUM SERPL-SCNC: 135 MMOL/L (ref 133–144)
TME LAST DOSE: ABNORMAL H

## 2020-04-08 ENCOUNTER — DOCUMENTATION ONLY (OUTPATIENT)
Dept: CARE COORDINATION | Facility: CLINIC | Age: 55
End: 2020-04-08

## 2020-04-09 DIAGNOSIS — Z94.1 TRANSPLANTED HEART (H): ICD-10-CM

## 2020-04-09 LAB
ALBUMIN SERPL-MCNC: 3.5 G/DL (ref 3.4–5)
ALP SERPL-CCNC: 265 U/L (ref 40–150)
ALT SERPL W P-5'-P-CCNC: 31 U/L (ref 0–70)
ALT SERPL W/O P-5'-P-CCNC: 31 U/L (ref 0–70)
ANION GAP SERPL CALCULATED.3IONS-SCNC: 6 MMOL/L (ref 3–14)
AST SERPL W P-5'-P-CCNC: 22 U/L (ref 0–45)
AST SERPL-CCNC: 22 U/L (ref 0–45)
BILIRUB SERPL-MCNC: 0.3 MG/DL (ref 0.2–1.3)
BUN SERPL-MCNC: 21 MG/DL (ref 7–30)
CALCIUM SERPL-MCNC: 8.2 MG/DL (ref 8.5–10.1)
CHLORIDE SERPL-SCNC: 109 MMOL/L (ref 94–109)
CO2 SERPL-SCNC: 23 MMOL/L (ref 20–32)
CREAT SERPL-MCNC: 1.2 MG/DL (ref 0.66–1.25)
CREAT SERPL-MCNC: 1.2 MG/DL (ref 0.66–1.25)
DIFFERENTIAL METHOD BLD: ABNORMAL
EOSINOPHIL # BLD AUTO: 0.2 10E9/L (ref 0–0.7)
EOSINOPHIL NFR BLD AUTO: 4 %
ERYTHROCYTE [DISTWIDTH] IN BLOOD BY AUTOMATED COUNT: 13.3 % (ref 10–15)
GFR ESTIMATE EXT - HISTORICAL: 67 ML/MIN/1.73M2
GFR ESTIMATE, IF BLACK EXT - HISTORICAL: 77 ML/MIN/1.73M2
GFR SERPL CREATININE-BSD FRML MDRD: 67 ML/MIN/{1.73_M2}
GLUCOSE SERPL-MCNC: 102 MG/DL (ref 70–99)
HCT VFR BLD AUTO: 32 % (ref 40–53)
HGB BLD-MCNC: 9.9 G/DL (ref 13.3–17.7)
LYMPHOCYTES # BLD AUTO: 2.8 10E9/L (ref 0.8–5.3)
LYMPHOCYTES NFR BLD AUTO: 54 %
MCH RBC QN AUTO: 24.1 PG (ref 26.5–33)
MCHC RBC AUTO-ENTMCNC: 30.9 G/DL (ref 31.5–36.5)
MCV RBC AUTO: 78 FL (ref 78–100)
MICROCYTES BLD QL SMEAR: PRESENT
MONOCYTES # BLD AUTO: 0.6 10E9/L (ref 0–1.3)
MONOCYTES NFR BLD AUTO: 12 %
NEUTROPHILS # BLD AUTO: 1.6 10E9/L (ref 1.6–8.3)
NEUTROPHILS NFR BLD AUTO: 30 %
PLATELET # BLD AUTO: 274 10E9/L (ref 150–450)
PLATELET # BLD EST: ABNORMAL 10*3/UL
POTASSIUM SERPL-SCNC: 4.8 MMOL/L (ref 3.4–5.3)
PROT SERPL-MCNC: 7.5 G/DL (ref 6.8–8.8)
RBC # BLD AUTO: 4.11 10E12/L (ref 4.4–5.9)
SMUDGE CELLS BLD QL SMEAR: PRESENT
SODIUM SERPL-SCNC: 138 MMOL/L (ref 133–144)
WBC # BLD AUTO: 5.2 10E9/L (ref 4–11)

## 2020-04-09 PROCEDURE — 85025 COMPLETE CBC W/AUTO DIFF WBC: CPT | Performed by: INTERNAL MEDICINE

## 2020-04-09 PROCEDURE — 80195 ASSAY OF SIROLIMUS: CPT | Performed by: INTERNAL MEDICINE

## 2020-04-09 PROCEDURE — 80053 COMPREHEN METABOLIC PANEL: CPT | Performed by: INTERNAL MEDICINE

## 2020-04-09 PROCEDURE — 36415 COLL VENOUS BLD VENIPUNCTURE: CPT | Performed by: INTERNAL MEDICINE

## 2020-04-10 ENCOUNTER — TELEPHONE (OUTPATIENT)
Dept: TRANSPLANT | Facility: CLINIC | Age: 55
End: 2020-04-10

## 2020-04-10 LAB
SIROLIMUS BLD-MCNC: <2 UG/L (ref 5–15)
TME LAST DOSE: ABNORMAL H

## 2020-04-13 DIAGNOSIS — Z94.1 TRANSPLANTED HEART (H): ICD-10-CM

## 2020-04-13 RX ORDER — SIROLIMUS 0.5 MG/1
0.5 TABLET, FILM COATED ORAL DAILY
Qty: 270 TABLET | Refills: 3 | Status: SHIPPED | OUTPATIENT
Start: 2020-04-13 | End: 2020-04-27

## 2020-04-15 ENCOUNTER — AMBULATORY - HEALTHEAST (OUTPATIENT)
Dept: LAB | Facility: CLINIC | Age: 55
End: 2020-04-15

## 2020-04-15 DIAGNOSIS — Z79.899 NEED FOR PROPHYLACTIC CHEMOTHERAPY: ICD-10-CM

## 2020-04-15 DIAGNOSIS — Z94.1 TRANSPLANTED HEART (H): ICD-10-CM

## 2020-04-23 ENCOUNTER — AMBULATORY - HEALTHEAST (OUTPATIENT)
Dept: LAB | Facility: CLINIC | Age: 55
End: 2020-04-23

## 2020-04-23 DIAGNOSIS — Z79.899 NEED FOR PROPHYLACTIC CHEMOTHERAPY: ICD-10-CM

## 2020-04-23 DIAGNOSIS — Z94.1 TRANSPLANTED HEART (H): ICD-10-CM

## 2020-04-23 LAB
ANION GAP SERPL CALCULATED.3IONS-SCNC: 8 MMOL/L (ref 5–18)
BUN SERPL-MCNC: 25 MG/DL (ref 8–22)
CALCIUM SERPL-MCNC: 8.8 MG/DL (ref 8.5–10.5)
CHLORIDE BLD-SCNC: 107 MMOL/L (ref 98–107)
CO2 SERPL-SCNC: 23 MMOL/L (ref 22–31)
CREAT SERPL-MCNC: 1.2 MG/DL (ref 0.7–1.3)
ERYTHROCYTE [DISTWIDTH] IN BLOOD BY AUTOMATED COUNT: 12.8 % (ref 11–14.5)
GFR SERPL CREATININE-BSD FRML MDRD: >60 ML/MIN/1.73M2
GLUCOSE BLD-MCNC: 84 MG/DL (ref 70–125)
HCT VFR BLD AUTO: 30.7 % (ref 40–54)
HGB BLD-MCNC: 10.2 G/DL (ref 14–18)
MCH RBC QN AUTO: 24.9 PG (ref 27–34)
MCHC RBC AUTO-ENTMCNC: 33.2 G/DL (ref 32–36)
MCV RBC AUTO: 75 FL (ref 80–100)
PLATELET # BLD AUTO: 230 THOU/UL (ref 140–440)
PMV BLD AUTO: 7.3 FL (ref 7–10)
POTASSIUM BLD-SCNC: 4.9 MMOL/L (ref 3.5–5)
RBC # BLD AUTO: 4.1 MILL/UL (ref 4.4–6.2)
SODIUM SERPL-SCNC: 138 MMOL/L (ref 136–145)
WBC: 5 THOU/UL (ref 4–11)

## 2020-04-24 LAB
CYCLOSPORINE BLD LC/MS/MS-MCNC: <25 UG/L (ref 50–400)
SIROLIMUS BLD-MCNC: 2.8 UG/L (ref 5–15)
TACROLIMUS BLD-MCNC: 6 UG/L (ref 5–15)
TACROLIMUS LAST DOSE: NORMAL
TME LAST DOSE: ABNORMAL H
TME LAST DOSE: ABNORMAL H

## 2020-04-27 ENCOUNTER — TELEPHONE (OUTPATIENT)
Dept: TRANSPLANT | Facility: CLINIC | Age: 55
End: 2020-04-27

## 2020-04-27 DIAGNOSIS — Z94.1 TRANSPLANTED HEART (H): ICD-10-CM

## 2020-04-27 RX ORDER — SIROLIMUS 0.5 MG/1
TABLET, FILM COATED ORAL
Qty: 360 TABLET | Refills: 3 | Status: SHIPPED | OUTPATIENT
Start: 2020-04-27 | End: 2020-05-06

## 2020-04-27 NOTE — TELEPHONE ENCOUNTER
Rapa level 2.8. Goal 6-8. Good 24 hour trough per patient. Current dose 1.5 mg daily. Cr 1.2. Wbc 5. Instructions given to increase dose to 2 mg daily and repeat a level in 1 week.     Tacro level 6. Goal 6-8. Good 12 hour trough per patient. Current dose 3.5. mg bid. No changes at this time.     Pt is not on cyclosporine.

## 2020-05-04 ENCOUNTER — TELEPHONE (OUTPATIENT)
Dept: CARDIOLOGY | Facility: CLINIC | Age: 55
End: 2020-05-04

## 2020-05-04 DIAGNOSIS — Z94.1 TRANSPLANTED HEART (H): Primary | ICD-10-CM

## 2020-05-04 DIAGNOSIS — Z13.220 SCREENING CHOLESTEROL LEVEL: ICD-10-CM

## 2020-05-04 DIAGNOSIS — Z12.5 PROSTATE CANCER SCREENING: ICD-10-CM

## 2020-05-05 ENCOUNTER — APPOINTMENT (OUTPATIENT)
Dept: MEDSURG UNIT | Facility: CLINIC | Age: 55
End: 2020-05-05
Attending: INTERNAL MEDICINE
Payer: COMMERCIAL

## 2020-05-05 ENCOUNTER — APPOINTMENT (OUTPATIENT)
Dept: LAB | Facility: CLINIC | Age: 55
End: 2020-05-05
Attending: INTERNAL MEDICINE
Payer: COMMERCIAL

## 2020-05-05 ENCOUNTER — HOSPITAL ENCOUNTER (OUTPATIENT)
Facility: CLINIC | Age: 55
Discharge: STILL ADMITTED OR ADMINISTRATIVE | End: 2020-05-05
Attending: INTERNAL MEDICINE | Admitting: INTERNAL MEDICINE
Payer: COMMERCIAL

## 2020-05-05 ENCOUNTER — OFFICE VISIT (OUTPATIENT)
Dept: CARDIOLOGY | Facility: CLINIC | Age: 55
End: 2020-05-05

## 2020-05-05 ENCOUNTER — SURGERY (OUTPATIENT)
Age: 55
End: 2020-05-05
Payer: COMMERCIAL

## 2020-05-05 ENCOUNTER — RECORDS - HEALTHEAST (OUTPATIENT)
Dept: ADMINISTRATIVE | Facility: OTHER | Age: 55
End: 2020-05-05

## 2020-05-05 VITALS
HEIGHT: 68 IN | SYSTOLIC BLOOD PRESSURE: 145 MMHG | TEMPERATURE: 98.5 F | WEIGHT: 160 LBS | DIASTOLIC BLOOD PRESSURE: 81 MMHG | OXYGEN SATURATION: 99 % | RESPIRATION RATE: 18 BRPM | BODY MASS INDEX: 24.25 KG/M2

## 2020-05-05 DIAGNOSIS — Z94.1 TRANSPLANTED HEART (H): ICD-10-CM

## 2020-05-05 DIAGNOSIS — T86.290 VASCULOPATHY OF CARDIAC ALLOGRAFT (H): ICD-10-CM

## 2020-05-05 DIAGNOSIS — D84.9 IMMUNOSUPPRESSION (H): ICD-10-CM

## 2020-05-05 DIAGNOSIS — I10 BENIGN ESSENTIAL HYPERTENSION: ICD-10-CM

## 2020-05-05 DIAGNOSIS — Z94.1 TRANSPLANTED HEART (H): Primary | ICD-10-CM

## 2020-05-05 PROCEDURE — 40000166 ZZH STATISTIC PP CARE STAGE 1

## 2020-05-05 PROCEDURE — 99214 OFFICE O/P EST MOD 30 MIN: CPT | Mod: 25 | Performed by: NURSE PRACTITIONER

## 2020-05-05 PROCEDURE — 88350 IMFLUOR EA ADDL 1ANTB STN PX: CPT | Performed by: INTERNAL MEDICINE

## 2020-05-05 PROCEDURE — 88346 IMFLUOR 1ST 1ANTB STAIN PX: CPT | Performed by: INTERNAL MEDICINE

## 2020-05-05 PROCEDURE — 25000125 ZZHC RX 250: Performed by: INTERNAL MEDICINE

## 2020-05-05 PROCEDURE — 88307 TISSUE EXAM BY PATHOLOGIST: CPT | Performed by: INTERNAL MEDICINE

## 2020-05-05 PROCEDURE — 93505 ENDOMYOCARDIAL BIOPSY: CPT | Mod: 26 | Performed by: INTERNAL MEDICINE

## 2020-05-05 PROCEDURE — 27210794 ZZH OR GENERAL SUPPLY STERILE: Performed by: INTERNAL MEDICINE

## 2020-05-05 PROCEDURE — C1894 INTRO/SHEATH, NON-LASER: HCPCS | Performed by: INTERNAL MEDICINE

## 2020-05-05 PROCEDURE — 93505 ENDOMYOCARDIAL BIOPSY: CPT | Performed by: INTERNAL MEDICINE

## 2020-05-05 RX ORDER — LIDOCAINE 40 MG/G
CREAM TOPICAL
Status: COMPLETED | OUTPATIENT
Start: 2020-05-05 | End: 2020-05-05

## 2020-05-05 RX ADMIN — LIDOCAINE HYDROCHLORIDE 5 ML: 10 INJECTION, SOLUTION EPIDURAL; INFILTRATION; INTRACAUDAL; PERINEURAL at 12:24

## 2020-05-05 RX ADMIN — LIDOCAINE HYDROCHLORIDE 5 ML: 10 INJECTION, SOLUTION EPIDURAL; INFILTRATION; INTRACAUDAL; PERINEURAL at 12:21

## 2020-05-05 RX ADMIN — LIDOCAINE: 40 CREAM TOPICAL at 11:28

## 2020-05-05 ASSESSMENT — MIFFLIN-ST. JEOR: SCORE: 1525.26

## 2020-05-05 NOTE — IP AVS SNAPSHOT
MRN:[id number]                      After Visit Summary   5/5/2020    Everton Larios    MRN: [id number]           Visit Information        Department      5/5/2020 10:38 AM [address] [facility]          Review of your medicines      UNREVIEWED medicines. Ask your doctor about these medicines       Dose / Directions   acetaminophen 325 MG tablet  Commonly known as:  TYLENOL  Used for:  Heart replaced by transplant (H)      Dose:  650 mg  Take 2 tablets (650 mg) by mouth every 4 hours as needed for mild pain  Refills:  0     albuterol 108 (90 Base) MCG/ACT inhaler  Commonly known as:  PROAIR HFA/PROVENTIL HFA/VENTOLIN HFA  Used for:  Intermittent asthma without complication, unspecified asthma severity      Dose:  2 puff  Inhale 2 puffs into the lungs every 6 hours as needed for shortness of breath / dyspnea or wheezing  Refills:  0     alendronate 70 MG tablet  Commonly known as:  FOSAMAX  Used for:  Age-related osteoporosis with current pathological fracture with routine healing, subsequent encounter, History of corticosteroid therapy, Hypothyroidism due to medication, Low TSH level      Dose:  70 mg  Take 1 tablet (70 mg) by mouth every 7 days Take 60 minutes before am meal with 8 oz. water. Remain upright for 30 minutes.  Quantity:  12 tablet  Refills:  3     amLODIPine 5 MG tablet  Commonly known as:  NORVASC  Used for:  Benign essential hypertension      TAKE 1 TABLET BY MOUTH EVERY DAY  Quantity:  90 tablet  Refills:  0     aspirin 81 MG chewable tablet  Commonly known as:  ASA  Used for:  S/P orthotopic heart transplant (H)      Dose:  81 mg  Take 1 tablet (81 mg) by mouth daily  Refills:  0     balsalazide 750 MG capsule  Commonly known as:  COLAZAL  Used for:  Ulcerative pancolitis (H)      Take 5 capsules in AM and 4 capsules in PM  Quantity:  270 capsule  Refills:  3     calcium carbonate 600 mg-vitamin D 400 units 600-400 MG-UNIT per tablet  Commonly known as:  CALTRATE  Used for:   Heart replaced by transplant (H)      Dose:  1 tablet  Take 1 tablet by mouth 2 times daily (with meals)  Refills:  0     cetirizine 10 MG tablet  Commonly known as:  zyrTEC      Dose:  10 mg  Take 10 mg by mouth daily  Refills:  0     DULoxetine 20 MG capsule  Commonly known as:  CYMBALTA      Dose:  20 mg  Take 20 mg by mouth daily  Refills:  0     fluticasone 220 MCG/ACT inhaler  Commonly known as:  FLOVENT HFA      Dose:  2 puff  Inhale 2 puffs into the lungs 2 times daily  Refills:  0     levothyroxine 100 MCG tablet  Commonly known as:  SYNTHROID/LEVOTHROID  Used for:  Age-related osteoporosis with current pathological fracture with routine healing, subsequent encounter, History of corticosteroid therapy, Hypothyroidism due to medication, Low TSH level      MON to SAT 1 tablet/day;SUN 0.5 tablet  Quantity:  90 tablet  Refills:  3     magnesium oxide 400 MG tablet  Commonly known as:  MAG-OX  Used for:  Heart replaced by transplant (H)      Dose:  400 mg  Take 1 tablet (400 mg) by mouth 2 times daily  Quantity:  180 tablet  Refills:  3     mesalamine 800 MG EC tablet  Commonly known as:  ASACOL HD  Used for:  Ulcerative pancolitis (H)      Dose:  800 mg  Take 1 tablet (800 mg) by mouth 3 times daily  Quantity:  270 tablet  Refills:  1     multivitamin w/minerals tablet  Used for:  Heart replaced by transplant (H)      Dose:  1 tablet  Take 1 tablet by mouth daily  Refills:  0     mycophenolate 250 MG capsule  Commonly known as:  GENERIC EQUIVALENT  Used for:  Heart replaced by transplant (H)      Dose:  1,500 mg  Take 6 capsules (1,500 mg) by mouth 2 times daily  Quantity:  360 capsule  Refills:  11     omeprazole 40 MG DR capsule  Commonly known as:  priLOSEC  Used for:  Nausea      Dose:  40 mg  Take 1 capsule (40 mg) by mouth daily  Refills:  0     ondansetron 4 MG ODT tab  Commonly known as:  ZOFRAN-ODT      DISSOLVE 1 TABLET ON THE TONGUE EVERY EIGHT HOURS AS NEEDED  Refills:  3     polyethylene glycol 17  GM/SCOOP powder  Commonly known as:  MIRALAX      Dose:  1 capful  Take 1 capful by mouth daily  Refills:  0     polyethylene glycol 236 g suspension  Commonly known as:  GoLYTELY  Used for:  Special screening for malignant neoplasms, colon  Ask about: Should I take this medication?      Dose:  4 L  Take 4,000 mLs (4 L) by mouth once for 1 dose  Quantity:  4000 mL  Refills:  0     rosuvastatin 10 MG tablet  Commonly known as:  CRESTOR  Used for:  Heart replaced by transplant (H)      TAKE 1 TABLET BY MOUTH EVERY DAY  Quantity:  30 tablet  Refills:  5     senna 8.6 MG tablet  Commonly known as:  SENOKOT      Dose:  2 tablet  Take 2 tablets by mouth 2 times daily as needed for constipation  Refills:  0     sirolimus 0.5 MG tablet  Commonly known as:  GENERIC EQUIVALENT  Used for:  Transplanted heart (H)      Take 4 tablets (2 mg) by mouth daily.  Quantity:  360 tablet  Refills:  3     * tacrolimus 1 MG capsule  Commonly known as:  GENERIC EQUIVALENT  Used for:  Heart replaced by transplant (H)      Dose:  3 mg  Take 3 capsules (3 mg) by mouth 2 times daily  Quantity:  180 capsule  Refills:  11     * tacrolimus 0.5 MG capsule  Commonly known as:  GENERIC EQUIVALENT  Used for:  Heart replaced by transplant (H)      Take one capsule (0.5 mg) in am with three (1 mg)  to equal 3.5 mg.  Quantity:  90 capsule  Refills:  11         * This list has 2 medication(s) that are the same as other medications prescribed for you. Read the directions carefully, and ask your doctor or other care provider to review them with you.            CONTINUE these medicines which may have CHANGED, or have new prescriptions. If we are uncertain of the size of tablets/capsules you have at home, strength may be listed as something that might have changed.       Dose / Directions   melatonin 3 MG tablet  This may have changed:  how much to take  Used for:  Heart replaced by transplant (H)      Dose:  6 mg  Take 2 tablets (6 mg) by mouth At  Bedtime  Refills:  0              Protect others around you: Learn how to safely use, store and throw away your medicines at www.disposemymeds.org.       Follow-ups after your visit       Your next 10 appointments already scheduled    May 05, 2020  Procedure with Yves Rodrigues MD  [facility], [place],  Heart Cath Lab ([facility], [facility]) [address] [address]  [phone]   The [facility] is located on the corner of [address] and [address] on the [facility]. It is easily accessible from virtually any point in the [place] [facility] area, via [address].      Care Instructions       Further instructions from your care team       [facility]                        Interventional Cardiology  Discharge Instructions   Post Heart Biopsy      AFTER YOU GO HOME:    DO drink plenty of fluids    DO resume your regular diet and medications unless otherwise instructed by your Primary Physician    Do Not scrub the procedure site vigorously    No lotion or powder to the puncture site for 3 days    CALL YOUR PRIMARY PHYSICIAN IF: You may resume all normal activity.  Monitor neck site for bleeding, swelling, or voice changes. If you notice bleeding or swelling immediately apply pressure to the site and call number below to speak with Cardiology Fellow.  If you experience any changes in your breathing you should call your doctor immediately or come to the closest Emergency Department.  Do not drive yourself.    ADDITIONAL INSTRUCTIONS: Medications: You are to resume all home medications including anticoagulation therapy unless otherwise advised by your primary cardiologist or nurse coordinator.    Follow Up: Per your primary cardiology team    If you have any questions or concerns regarding your procedure site please call [phone] at anytime and ask for Cardiology Fellow on call.  They are  available 24 hours a day.  You may also contact the Cardiology Clinic after hours number at [phone].                                                       Telephone Numbers [phone] Monday-Friday 8:00 am to 4:30 pm    [phone] After 4:30 pm Monday-Friday, Weekends & Holidays  Ask for Interventional Cardiologist on call. Someone is on call 24 hours/day   [facility] toll free number [phone] Monday-Friday 8:00 am to 4:30 pm   [facility] Emergency Dept [phone]                   Additional Information About Your Visit       [employer] Information    [employer] gives you secure access to your electronic health record. If you see a primary care provider, you can also send messages to your care team and make appointments. If you have questions, please call your primary care clinic.  If you do not have a primary care provider, please call [phone] and they will assist you.       Care EveryWhere ID    This is your Care EveryWhere ID. This could be used by other organizations to access your [facility] medical records  [id number]        Primary Care Provider Office Phone # Fax #    Fredrick J New [phone]      Equal Access to Services    BELIA GOLDBERG AH: Hadii aaron britoo Soparvez, waaxda luqadaha, qaybta kaalmada ademary anneyada, dean topete. So [facility] [phone].    ATENCIÓN: Si habla español, tiene a anaya disposición servicios gratuitos de asistencia lingüística. Llame al [phone].    We comply with applicable federal and state civil rights laws, including the [place] Human Rights Act. We do not discriminate on the basis of race, color, creed, Voodoo, national origin, marital status, age, disability, sex, sexual orientation, or gender identity.       Thank you!    Thank you for choosing [facility] for your care. Our goal is always to provide you with excellent care. Hearing back from our patients is one way we can continue to improve our services. Please  take a few minutes to complete the written survey that you may receive in the mail after you visit with us. Thank you!            Medication List      Medications          Morning Afternoon Evening Bedtime As Needed    melatonin 3 MG tablet  INSTRUCTIONS:  Take 2 tablets (6 mg) by mouth At Bedtime                       ASK your doctor about these medications          Morning Afternoon Evening Bedtime As Needed    acetaminophen 325 MG tablet  Also known as:  TYLENOL  INSTRUCTIONS:  Take 2 tablets (650 mg) by mouth every 4 hours as needed for mild pain                     albuterol 108 (90 Base) MCG/ACT inhaler  Also known as:  PROAIR HFA/PROVENTIL HFA/VENTOLIN HFA  INSTRUCTIONS:  Inhale 2 puffs into the lungs every 6 hours as needed for shortness of breath / dyspnea or wheezing                     alendronate 70 MG tablet  Also known as:  FOSAMAX  INSTRUCTIONS:  Take 1 tablet (70 mg) by mouth every 7 days Take 60 minutes before am meal with 8 oz. water. Remain upright for 30 minutes.                     amLODIPine 5 MG tablet  Also known as:  NORVASC  INSTRUCTIONS:  TAKE 1 TABLET BY MOUTH EVERY DAY                     aspirin 81 MG chewable tablet  Also known as:  ASA  INSTRUCTIONS:  Take 1 tablet (81 mg) by mouth daily                     balsalazide 750 MG capsule  Also known as:  COLAZAL  INSTRUCTIONS:  Take 5 capsules in AM and 4 capsules in PM                     calcium carbonate 600 mg-vitamin D 400 units 600-400 MG-UNIT per tablet  Also known as:  CALTRATE  INSTRUCTIONS:  Take 1 tablet by mouth 2 times daily (with meals)                     cetirizine 10 MG tablet  Also known as:  zyrTEC  INSTRUCTIONS:  Take 10 mg by mouth daily                     DULoxetine 20 MG capsule  Also known as:  CYMBALTA  INSTRUCTIONS:  Take 20 mg by mouth daily                     fluticasone 220 MCG/ACT inhaler  Also known as:  FLOVENT HFA  INSTRUCTIONS:  Inhale 2 puffs into the lungs 2 times daily                      "levothyroxine 100 MCG tablet  Also known as:  SYNTHROID/LEVOTHROID  INSTRUCTIONS:  MON to SAT 1 tablet/day;SUN 0.5 tablet                     magnesium oxide 400 MG tablet  Also known as:  MAG-OX  INSTRUCTIONS:  Take 1 tablet (400 mg) by mouth 2 times daily  Doctor's comments:  Increase in dose                     mesalamine 800 MG EC tablet  Also known as:  ASACOL HD  INSTRUCTIONS:  Take 1 tablet (800 mg) by mouth 3 times daily                     multivitamin w/minerals tablet  INSTRUCTIONS:  Take 1 tablet by mouth daily                     mycophenolate 250 MG capsule  Also known as:  GENERIC EQUIVALENT  INSTRUCTIONS:  Take 6 capsules (1,500 mg) by mouth 2 times daily                     omeprazole 40 MG DR capsule  Also known as:  priLOSEC  INSTRUCTIONS:  Take 1 capsule (40 mg) by mouth daily                     ondansetron 4 MG ODT tab  Also known as:  ZOFRAN-ODT  INSTRUCTIONS:  DISSOLVE 1 TABLET ON THE TONGUE EVERY EIGHT HOURS AS NEEDED                     polyethylene glycol 17 GM/SCOOP powder  Also known as:  MIRALAX  INSTRUCTIONS:  Take 1 capful by mouth daily                     polyethylene glycol 236 g suspension  Also known as:  GoLYTELY  INSTRUCTIONS:  Take 4,000 mLs (4 L) by mouth once for 1 dose  Doctor's comments:  Please take per \"Split-Dose Golytely\" instructions  Ask about: Should I take this medication?                     rosuvastatin 10 MG tablet  Also known as:  CRESTOR  INSTRUCTIONS:  TAKE 1 TABLET BY MOUTH EVERY DAY                     senna 8.6 MG tablet  Also known as:  SENOKOT  INSTRUCTIONS:  Take 2 tablets by mouth 2 times daily as needed for constipation                     sirolimus 0.5 MG tablet  Also known as:  GENERIC EQUIVALENT  INSTRUCTIONS:  Take 4 tablets (2 mg) by mouth daily.  Doctor's comments:  TXP DT 2/24/2019 (Heart) TXP Dischg DT 4/3/2019 DX Heart replaced by transplant Z94.1 [facility] ([place], [place])                     " * tacrolimus 1 MG capsule  Also known as:  GENERIC EQUIVALENT  INSTRUCTIONS:  Take 3 capsules (3 mg) by mouth 2 times daily  Doctor's comments:  TXP DT 2/24/2019 (Heart) TXP Dischg DT 4/3/2019 DX Heart replaced by transplant Z94.1 [facility] ([place], [place])                     * tacrolimus 0.5 MG capsule  Also known as:  GENERIC EQUIVALENT  INSTRUCTIONS:  Take one capsule (0.5 mg) in am with three (1 mg)  to equal 3.5 mg.  Doctor's comments:  TXP DT 2/24/2019 (Heart) TXP Dischg DT 4/3/2019 DX Heart replaced by transplant Z94.1 [facility] ([place], [place])                        * This list has 2 medication(s) that are the same as other medications prescribed for you. Read the directions carefully, and ask your doctor or other care provider to review them with you.

## 2020-05-05 NOTE — PROGRESS NOTES
Pt has returned to unit 2a s/p heart biopsy. Right neck site CDI, soft, flat, non tender. Discharge instructions were reviewed with pt pre procedure, pt reports he doesn't have any questions.   MRI coming to get pt for his cardiac MRI.

## 2020-05-05 NOTE — LETTER
5/5/2020      RE: Everton Larios  [address] [facility] [address]       Dear Colleague,    Thank you for the opportunity to participate in the care of your patient, Everton Larios, at the [facility] HEART [facility]. Please see a copy of my visit note below.    ADULT HEART TRANSPLANT CLINIC    HPI:   Mr. Larios is a 55 year old male with history of ASD (s/p repair in childhood), AFib/AF (s/p ablation), NICM, diastolic dysfunction, alcohol abuse, Pierce's esophagus, ulcerative colitis, GIB (s/p splenic embolization), and hypothyroidism, now s/p OHT and bypass of persistent left SVC to right atrial appendage 2/24/19 who presents to  for surveillance biopsy after change to immunosuppression (MMF changed to sirolimus due to coronary allograft vasculopathy). His postoperative course c/b shock due to RV dysfunction requiring IABP, small pneumoperitoneum (clinically insignificant), chest wall hematoma (former ICD site, s/p evacuation and drain placement 3/31/19), HCAP/klebsiella pneumonia, FRANKLIN (required short-term HD, now recovered), and AMR (3/25/19 showed mild AMR1 with some staining for c4d repeat biopsy 3/28 showed improved AMR and less reactive capillary staining) subsequent biopsies have shown resolution of AMR and improved capillary staining.  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  Right heart catheterization at the time showed an RA of 7 PA 30/16 with a mean of 20 and a wedge pressure of 12.  He had normal cardiac output at that time.  He was supposed to have cardiac MRI at this time but it needs to be rescheduled and due to COVID precautions has not been completed.    Since last clinic visit patient offers no complaints.  He did have URI-like symptoms in mid March.  He was recommended to self quarantine for 14 days due to concern for COVID which he  did.  All respiratory symptoms have since resolved and he denies any recent fever or chills.  Patient denies lower extremity edema, orthopnea, PND, lightheadedness, dizziness, chest pain, palpitations, nausea, vomiting, diarrhea.  He has no night sweats, oral lesions, joint pains.    PAST MEDICAL HISTORY:  Past Medical History:   Diagnosis Date     Alcohol abuse      Pierce's esophagus 10/4/2018     Chronic rhinitis 10/4/2018     Chronic systolic heart failure (H) 10/4/2018     Depression 10/4/2018     Diastolic dysfunction      DJD (degenerative joint disease) - neck 10/4/2018     H/O congenital atrial septal defect (ASD) repair at age 3 10/4/2018     Hypothyroidism due to medication 10/4/2018     ICD, Define My Style 2008; gen change 2/2018 10/4/2018     Intermittent asthma without complication 10/4/2018     Nonischemic cardiomyopathy (H) 10/4/2018     On amiodarone therapy 10/4/2018     Paroxysmal atrial fibrillation (H) 10/4/2018     S/P ablation of atrial fibrillation 10/4/2018     Systolic heart failure (H)      Ulcerative colitis (H) 2005     Ventricular tachycardia (H) 10/4/2018       FAMILY HISTORY:  Family History   Problem Relation Age of Onset     Endometrial Cancer Mother      Osteoporosis Mother      Hypertension Father      Endometrial Cancer Maternal Grandmother      Hip fracture No family hx of      Nephrolithiasis No family hx of        SOCIAL HISTORY:  Social History     Socioeconomic History     Marital status: Single     Spouse name: Not on file     Number of children: Not on file     Years of education: Not on file     Highest education level: Not on file   Occupational History     Not on file   Social Needs     Financial resource strain: Not on file     Food insecurity     Worry: Not on file     Inability: Not on file     Transportation needs     Medical: Not on file     Non-medical: Not on file   Tobacco Use     Smoking status: Former Smoker     Packs/day: 0.00     Years: 10.00     Pack  years: 0.00     Start date: 1980     Last attempt to quit: 2008     Years since quittin.3     Smokeless tobacco: Never Used   Substance and Sexual Activity     Alcohol use: Yes     Alcohol/week: 1.0 - 2.0 standard drinks     Types: 1 - 2 Cans of beer per week     Frequency: Monthly or less     Drinks per session: 1 or 2     Binge frequency: Less than monthly     Drug use: No     Sexual activity: Not on file   Social History Narrative    Everton is a retired . He lives by himself in a townhouse in [place]. He has no lawn care responsibilities. He will be staying with his folks during his post-hospital early transplant care time. No history of TB exposures.        CURRENT MEDICATIONS:  acetaminophen (TYLENOL) 325 MG tablet, Take 2 tablets (650 mg) by mouth every 4 hours as needed for mild pain  albuterol (PROAIR HFA/PROVENTIL HFA/VENTOLIN HFA) 108 (90 Base) MCG/ACT inhaler, Inhale 2 puffs into the lungs every 6 hours as needed for shortness of breath / dyspnea or wheezing  alendronate (FOSAMAX) 70 MG tablet, Take 1 tablet (70 mg) by mouth every 7 days Take 60 minutes before am meal with 8 oz. water. Remain upright for 30 minutes.  amLODIPine (NORVASC) 5 MG tablet, TAKE 1 TABLET BY MOUTH EVERY DAY  aspirin (ASA) 81 MG chewable tablet, Take 1 tablet (81 mg) by mouth daily  balsalazide (COLAZAL) 750 MG capsule, Take 5 capsules in AM and 4 capsules in PM  calcium carbonate 600 mg-vitamin D 400 units (CALTRATE) 600-400 MG-UNIT per tablet, Take 1 tablet by mouth 2 times daily (with meals)  cetirizine (ZYRTEC) 10 MG tablet, Take 10 mg by mouth daily  DULoxetine (CYMBALTA) 20 MG EC capsule, Take 20 mg by mouth daily  fluticasone (FLOVENT HFA) 220 MCG/ACT Inhaler, Inhale 2 puffs into the lungs 2 times daily  levothyroxine (SYNTHROID/LEVOTHROID) 100 MCG tablet, MON to SAT 1 tablet/day;SUN 0.5 tablet  magnesium oxide (MAG-OX) 400 MG tablet, Take 1 tablet (400 mg) by mouth 2 times daily (Patient not  taking: Reported on 2019)  melatonin 3 MG tablet, Take 2 tablets (6 mg) by mouth At Bedtime (Patient taking differently: Take 5 mg by mouth At Bedtime )  mesalamine (ASACOL HD) 800 MG EC tablet, Take 1 tablet (800 mg) by mouth 3 times daily  multivitamin w/minerals (THERA-VIT-M) tablet, Take 1 tablet by mouth daily  mycophenolate (GENERIC EQUIVALENT) 250 MG capsule, Take 6 capsules (1,500 mg) by mouth 2 times daily  omeprazole (PRILOSEC) 40 MG DR capsule, Take 1 capsule (40 mg) by mouth daily  ondansetron (ZOFRAN-ODT) 4 MG ODT tab, DISSOLVE 1 TABLET ON THE TONGUE EVERY EIGHT HOURS AS NEEDED  [] polyethylene glycol (GOLYTELY/NULYTELY) 236 g suspension, Take 4,000 mLs (4 L) by mouth once for 1 dose  polyethylene glycol (MIRALAX/GLYCOLAX) powder, Take 1 capful by mouth daily  rosuvastatin (CRESTOR) 10 MG tablet, TAKE 1 TABLET BY MOUTH EVERY DAY  senna (SENOKOT) 8.6 MG tablet, Take 2 tablets by mouth 2 times daily as needed for constipation  sirolimus (GENERIC EQUIVALENT) 0.5 MG tablet, Take 4 tablets (2 mg) by mouth daily.  tacrolimus (GENERIC EQUIVALENT) 0.5 MG capsule, Take one capsule (0.5 mg) in am with three (1 mg)  to equal 3.5 mg.  tacrolimus (GENERIC EQUIVALENT) 1 MG capsule, Take 3 capsules (3 mg) by mouth 2 times daily    0.9% sodium chloride BOLUS  albuterol (PROAIR HFA/PROVENTIL HFA/VENTOLIN HFA) 108 (90 Base) MCG/ACT inhaler 2 puff  aminophylline 100 mg  diazepam (VALIUM) tablet 5 mg  diphenhydrAMINE (BENADRYL) capsule 25 mg  diphenhydrAMINE (BENADRYL) injection 25-50 mg  [COMPLETED] lidocaine (LMX4) cream  methylPREDNISolone sodium succinate (solu-MEDROL) injection 125 mg  ondansetron (ZOFRAN) injection 4 mg  regadenoson (LEXISCAN) injection 0.4 mg  sodium chloride (PF) 0.9% PF flush 5-10 mL  sodium chloride (PF) 0.9% PF flush 5-10 mL  sodium chloride bacteriostatic 0.9 % flush 0-1 mL        ROS:  See HPI    EXAM:  There were no vitals taken for this visit.    GENERAL: Appears comfortable, in  no acute distress.   HEENT: Eye symmetrical, no discharge or icterus bilaterally. Mucous membranes moist and without lesions. No thrush.   CV: RRR, +S1S2, no murmur, rub, or gallop. JVP not visible .   RESPIRATORY: Respirations regular, even, and unlabored. Lungs CTA throughout.   GI: Soft and non distended with normoactive bowel sounds present in all quadrants. No tenderness, rebound, guarding. No hepatomegaly.   EXTREMITIES: No peripheral edema. 2+ bilateral pedal pulses.   NEUROLOGIC: Alert and oriented x 3. No focal deficits.   MUSCULOSKELETAL: No joint swelling or tenderness.   SKIN: No jaundice. No rashes or lesions.     Labs - reviewed with patient in clinic today:  CBC RESULTS:  Lab Results   Component Value Date    WBC 5.5 05/05/2020    RBC 4.57 05/05/2020    HGB 10.6 (L) 05/05/2020    HCT 35.8 (L) 05/05/2020    MCV 78 05/05/2020    MCH 23.2 (L) 05/05/2020    MCHC 29.6 (L) 05/05/2020    RDW 13.9 05/05/2020     05/05/2020       CMP RESULTS:  Lab Results   Component Value Date     05/05/2020    POTASSIUM 4.6 05/05/2020    CHLORIDE 109 05/05/2020    CO2 24 05/05/2020    ANIONGAP 7 05/05/2020    GLC 86 05/05/2020    BUN 28 05/05/2020    CR 1.21 05/05/2020    GFRESTIMATED 66 05/05/2020    GFRESTBLACK 77 05/05/2020    RADAMES 8.6 05/05/2020    BILITOTAL 0.3 04/09/2020    ALBUMIN 3.5 04/09/2020    ALKPHOS 265 (H) 04/09/2020    ALT 31 04/09/2020    AST 22 04/09/2020        INR RESULTS:  Lab Results   Component Value Date    INR 1.17 (H) 09/23/2019       LIPID RESULTS:  Lab Results   Component Value Date    CHOL 171 05/05/2020    HDL 66 05/05/2020    LDL 81 05/05/2020    TRIG 119 05/05/2020       IMMUNOSUPPRESSANT LEVELS:  Lab Results   Component Value Date    TACROL 6.1 02/12/2020    DOSTAC Not Provided 02/12/2020    RAPAMY 2.4 (L) 05/05/2020       No components found for: CK  Lab Results   Component Value Date    MAG 1.8 05/05/2020     Lab Results   Component Value Date    A1C 5.8 (H) 02/12/2020     Lab  Results   Component Value Date    PHOS 3.3 05/05/2020     Lab Results   Component Value Date    NTBNP 10,986 (H) 11/15/2018     Lab Results   Component Value Date    SAITESTMET  FCS 02/12/2020    SAICELL Class I 02/12/2020    [id number] None 02/12/2020    [id number] None 02/12/2020    SAIREPCOM  02/12/2020      Test performed by modified procedure. Serum heat inactivated and tested   by a modified (Foster) protocol including fetal calf serum addition.   High-risk, mfi >3,000. Mod-risk, mfi 500-3,000.       Lab Results   Component Value Date    SAIITESTME [facility] 02/12/2020    SAIICELL Class II 02/12/2020    [id number] None 02/12/2020    [id number] None 02/12/2020    SAIIREPCOM  02/12/2020      Test performed by modified procedure. Serum heat inactivated and tested   by a modified (Foster) protocol including fetal calf serum addition.   High-risk, mfi >3,000. Mod-risk, mfi 500-3,000.       Lab Results   Component Value Date    CSPEC Plasma, EDTA anticoagulant 02/12/2020       Diagnostic Studies:  RHC 2/12/20      Cor angio 2/12/20  Left Main    The vessel was visualized by selective angiography and is large. There was 0% vessel disease.    Left Anterior Descending    The vessel was visualized by selective angiography and is large. There was 0% vessel disease. IVUS was performed on the LMCA and LAD vessels. The LMCA was engaged with a 6 Fr Ikari LF 3.5 GC and the patient was anticoagulated with IV UFH. A BMW was passed into the distal LAD without difficulty. The [facility] IVUS was passed over the wire and manually pulled back while recording. Proximal LAD HERACLIO 0.3 Lumen 24.1 Vessel 29.8 19% area stenosis Mid LAD HERACLIO 0.3 Lumen 10.8 Vessel 13.7 21% area stenosis IVUS was performed on the vessel. Ultrasound supply: [facility] IVUS SHRT.    Left Circumflex    The vessel was visualized by selective angiography and is moderate in size. There was 0% vessel disease.    Right Coronary Artery    The vessel was visualized  by selective angiography and is large. There was 0% vessel disease.        Assessment/Plan:  Mr. Larios is a 55 year old male who is s/p orthotopic heart transplant on 2/24/19 who presents to 2A for biopsy after recent change to IS.  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  Clinically he has no signs of rejection and reports feeling well.  Lab testing is unremarkable.  He has no DSA's and last immunknow showed moderate response.  Sirolimus and tacrolimus levels are pending today.  He is due for cardiac MRI so we will attempt to do that today.    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 He is on amlodipine 5mg daily.  - Fluid status: euvolemic, off diuretics.      Serostatus: CMV D+/R+ EBV D+/R+ Toxo D-/R-     Immunosuppression:  - tacrolimus trough goal ~6  - sirolimus trough goal 6-8     PPx:  - CAV:  aspirin 81mg daily, rosuvastatin 10mg daily  - GERD:  Omeprazole (note h/o carrera's esophagus)  - Osteoporosis:  Calcium/vitamin D supplements, Fosamax     # Ulcerative Colitis  - Followed by GI. Recent colonoscopy 2/20/20.     # Hx of pseudomonas bacteremia and HCAP  Chest CT diffuse bilateral solid with peripheral groundglass pulmonary nodules/opacities and mediastinal adenopathy, BAL grew pseudomonas, fungitell was positive. He was treated with 4 weeks of IV cefepime and micafungin.    # Hx CMV  Patient was unable to afford valcyte. Dr. Shukla aware. No further need for weekly CMV levels.    # Hx  cdif colitis      25 minutes spent face-to-face with patient, >50% in counseling and/or coordination of care as described above      Candida Srivastava DNP, NP-C  5/5/2020

## 2020-05-05 NOTE — PROGRESS NOTES
Pt arrives to  for heart biopsy. Consent is signed. Discharge instructions reviewed with pt pre procedure, pt verbalizes understanding.

## 2020-05-05 NOTE — IP AVS SNAPSHOT
[address] [address]                                    After Visit Summary   5/5/2020    Everton Larios    MRN: [id number]           After Visit Summary Signature Page    I have received my discharge instructions, and my questions have been answered. I have discussed any challenges I see with this plan with the nurse or doctor.    ..........................................................................................................................................  Patient/Patient Representative Signature      ..........................................................................................................................................  Patient Representative Print Name and Relationship to Patient    ..................................................               ................................................  Date                                   Time    ..........................................................................................................................................  Reviewed by Signature/Title    ...................................................              ..............................................  Date                                               Time          22EPIC Rev 08/18

## 2020-05-06 DIAGNOSIS — K51.00 ULCERATIVE PANCOLITIS (H): ICD-10-CM

## 2020-05-06 DIAGNOSIS — Z94.1 TRANSPLANTED HEART (H): ICD-10-CM

## 2020-05-06 DIAGNOSIS — I10 BENIGN ESSENTIAL HYPERTENSION: ICD-10-CM

## 2020-05-06 DIAGNOSIS — Z94.1 HEART REPLACED BY TRANSPLANT (H): ICD-10-CM

## 2020-05-06 LAB — COPATH REPORT: NORMAL

## 2020-05-06 RX ORDER — SIROLIMUS 0.5 MG/1
TABLET, FILM COATED ORAL
Qty: 540 TABLET | Refills: 3 | Status: SHIPPED | OUTPATIENT
Start: 2020-05-06 | End: 2020-05-21

## 2020-05-06 NOTE — PROGRESS NOTES
ADULT HEART TRANSPLANT CLINIC    HPI:   Mr. Larios is a 55 year old male with history of ASD (s/p repair in childhood), AFib/AF (s/p ablation), NICM, diastolic dysfunction, alcohol abuse, Pierce's esophagus, ulcerative colitis, GIB (s/p splenic embolization), and hypothyroidism, now s/p OHT and bypass of persistent left SVC to right atrial appendage 2/24/19 who presents to 2A for surveillance biopsy after change to immunosuppression (MMF changed to sirolimus due to coronary allograft vasculopathy). His postoperative course c/b shock due to RV dysfunction requiring IABP, small pneumoperitoneum (clinically insignificant), chest wall hematoma (former ICD site, s/p evacuation and drain placement 3/31/19), HCAP/klebsiella pneumonia, [facility] (required short-term HD, now recovered), and AMR (3/25/19 showed mild AMR1 with some staining for c4d repeat biopsy 3/28 showed improved AMR and less reactive capillary staining) subsequent biopsies have shown resolution of AMR and improved capillary staining.  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  Right heart catheterization at the time showed an RA of 7 PA 30/16 with a mean of 20 and a wedge pressure of 12.  He had normal cardiac output at that time.  He was supposed to have cardiac MRI at this time but it needs to be rescheduled and due to COVID precautions has not been completed.    Since last clinic visit patient offers no complaints.  He did have URI-like symptoms in mid March.  He was recommended to self quarantine for 14 days due to concern for COVID which he did.  All respiratory symptoms have since resolved and he denies any recent fever or chills.  Patient denies lower extremity edema, orthopnea, PND, lightheadedness, dizziness, chest pain, palpitations, nausea, vomiting, diarrhea.  He has no night sweats, oral lesions, joint pains.    PAST MEDICAL HISTORY:  Past Medical History:    Diagnosis Date     Alcohol abuse      Pierce's esophagus 10/4/2018     Chronic rhinitis 10/4/2018     Chronic systolic heart failure (H) 10/4/2018     Depression 10/4/2018     Diastolic dysfunction      DJD (degenerative joint disease) - neck 10/4/2018     H/O congenital atrial septal defect (ASD) repair at age 3 10/4/2018     Hypothyroidism due to medication 10/4/2018     ICD, [employer] ; gen change 2018 10/4/2018     Intermittent asthma without complication 10/4/2018     Nonischemic cardiomyopathy (H) 10/4/2018     On amiodarone therapy 10/4/2018     Paroxysmal atrial fibrillation (H) 10/4/2018     S/P ablation of atrial fibrillation 10/4/2018     Systolic heart failure (H)      Ulcerative colitis (H)      Ventricular tachycardia (H) 10/4/2018       FAMILY HISTORY:  Family History   Problem Relation Age of Onset     Endometrial Cancer Mother      Osteoporosis Mother      Hypertension Father      Endometrial Cancer Maternal Grandmother      Hip fracture No family hx of      Nephrolithiasis No family hx of        SOCIAL HISTORY:  Social History     Socioeconomic History     Marital status: Single     Spouse name: Not on file     Number of children: Not on file     Years of education: Not on file     Highest education level: Not on file   Occupational History     Not on file   Social Needs     Financial resource strain: Not on file     Food insecurity     Worry: Not on file     Inability: Not on file     Transportation needs     Medical: Not on file     Non-medical: Not on file   Tobacco Use     Smoking status: Former Smoker     Packs/day: 0.00     Years: 10.00     Pack years: 0.00     Start date: 1980     Last attempt to quit: 2008     Years since quittin.3     Smokeless tobacco: Never Used   Substance and Sexual Activity     Alcohol use: Yes     Alcohol/week: 1.0 - 2.0 standard drinks     Types: 1 - 2 Cans of beer per week     Frequency: Monthly or less     Drinks per session: 1 or  2     Binge frequency: Less than monthly     Drug use: No     Sexual activity: Not on file   Social History Narrative    Everton is a retired . He lives by himself in a townhouse in [place]. He has no lawn care responsibilities. He will be staying with his folks during his post-hospital early transplant care time. No history of TB exposures.        CURRENT MEDICATIONS:  acetaminophen (TYLENOL) 325 MG tablet, Take 2 tablets (650 mg) by mouth every 4 hours as needed for mild pain  albuterol (PROAIR HFA/PROVENTIL HFA/VENTOLIN HFA) 108 (90 Base) MCG/ACT inhaler, Inhale 2 puffs into the lungs every 6 hours as needed for shortness of breath / dyspnea or wheezing  alendronate (FOSAMAX) 70 MG tablet, Take 1 tablet (70 mg) by mouth every 7 days Take 60 minutes before am meal with 8 oz. water. Remain upright for 30 minutes.  amLODIPine (NORVASC) 5 MG tablet, TAKE 1 TABLET BY MOUTH EVERY DAY  aspirin (ASA) 81 MG chewable tablet, Take 1 tablet (81 mg) by mouth daily  balsalazide (COLAZAL) 750 MG capsule, Take 5 capsules in AM and 4 capsules in PM  calcium carbonate 600 mg-vitamin D 400 units (CALTRATE) 600-400 MG-UNIT per tablet, Take 1 tablet by mouth 2 times daily (with meals)  cetirizine (ZYRTEC) 10 MG tablet, Take 10 mg by mouth daily  DULoxetine (CYMBALTA) 20 MG EC capsule, Take 20 mg by mouth daily  fluticasone (FLOVENT HFA) 220 MCG/ACT Inhaler, Inhale 2 puffs into the lungs 2 times daily  levothyroxine (SYNTHROID/LEVOTHROID) 100 MCG tablet, MON to SAT 1 tablet/day;SUN 0.5 tablet  magnesium oxide (MAG-OX) 400 MG tablet, Take 1 tablet (400 mg) by mouth 2 times daily (Patient not taking: Reported on 12/18/2019)  melatonin 3 MG tablet, Take 2 tablets (6 mg) by mouth At Bedtime (Patient taking differently: Take 5 mg by mouth At Bedtime )  mesalamine (ASACOL HD) 800 MG EC tablet, Take 1 tablet (800 mg) by mouth 3 times daily  multivitamin w/minerals (THERA-VIT-M) tablet, Take 1 tablet by mouth  daily  mycophenolate (GENERIC EQUIVALENT) 250 MG capsule, Take 6 capsules (1,500 mg) by mouth 2 times daily  omeprazole (PRILOSEC) 40 MG DR capsule, Take 1 capsule (40 mg) by mouth daily  ondansetron (ZOFRAN-ODT) 4 MG ODT tab, DISSOLVE 1 TABLET ON THE TONGUE EVERY EIGHT HOURS AS NEEDED  [] polyethylene glycol (GOLYTELY/NULYTELY) 236 g suspension, Take 4,000 mLs (4 L) by mouth once for 1 dose  polyethylene glycol (MIRALAX/GLYCOLAX) powder, Take 1 capful by mouth daily  rosuvastatin (CRESTOR) 10 MG tablet, TAKE 1 TABLET BY MOUTH EVERY DAY  senna (SENOKOT) 8.6 MG tablet, Take 2 tablets by mouth 2 times daily as needed for constipation  sirolimus (GENERIC EQUIVALENT) 0.5 MG tablet, Take 4 tablets (2 mg) by mouth daily.  tacrolimus (GENERIC EQUIVALENT) 0.5 MG capsule, Take one capsule (0.5 mg) in am with three (1 mg)  to equal 3.5 mg.  tacrolimus (GENERIC EQUIVALENT) 1 MG capsule, Take 3 capsules (3 mg) by mouth 2 times daily    0.9% sodium chloride BOLUS  albuterol (PROAIR HFA/PROVENTIL HFA/VENTOLIN HFA) 108 (90 Base) MCG/ACT inhaler 2 puff  aminophylline 100 mg  diazepam (VALIUM) tablet 5 mg  diphenhydrAMINE (BENADRYL) capsule 25 mg  diphenhydrAMINE (BENADRYL) injection 25-50 mg  [COMPLETED] lidocaine (LMX4) cream  methylPREDNISolone sodium succinate (solu-MEDROL) injection 125 mg  ondansetron (ZOFRAN) injection 4 mg  regadenoson (LEXISCAN) injection 0.4 mg  sodium chloride (PF) 0.9% PF flush 5-10 mL  sodium chloride (PF) 0.9% PF flush 5-10 mL  sodium chloride bacteriostatic 0.9 % flush 0-1 mL        ROS:  See HPI    EXAM:  There were no vitals taken for this visit.    GENERAL: Appears comfortable, in no acute distress.   HEENT: Eye symmetrical, no discharge or icterus bilaterally. Mucous membranes moist and without lesions. No thrush.   CV: RRR, +S1S2, no murmur, rub, or gallop. JVP not visible .   RESPIRATORY: Respirations regular, even, and unlabored. Lungs CTA throughout.   GI: Soft and non distended with  normoactive bowel sounds present in all quadrants. No tenderness, rebound, guarding. No hepatomegaly.   EXTREMITIES: No peripheral edema. 2+ bilateral pedal pulses.   NEUROLOGIC: Alert and oriented x 3. No focal deficits.   MUSCULOSKELETAL: No joint swelling or tenderness.   SKIN: No jaundice. No rashes or lesions.     Labs - reviewed with patient in clinic today:  CBC RESULTS:  Lab Results   Component Value Date    WBC 5.5 05/05/2020    RBC 4.57 05/05/2020    HGB 10.6 (L) 05/05/2020    HCT 35.8 (L) 05/05/2020    MCV 78 05/05/2020    MCH 23.2 (L) 05/05/2020    MCHC 29.6 (L) 05/05/2020    RDW 13.9 05/05/2020     05/05/2020       CMP RESULTS:  Lab Results   Component Value Date     05/05/2020    POTASSIUM 4.6 05/05/2020    CHLORIDE 109 05/05/2020    CO2 24 05/05/2020    ANIONGAP 7 05/05/2020    GLC 86 05/05/2020    BUN 28 05/05/2020    CR 1.21 05/05/2020    GFRESTIMATED 66 05/05/2020    GFRESTBLACK 77 05/05/2020    RADAMES 8.6 05/05/2020    BILITOTAL 0.3 04/09/2020    ALBUMIN 3.5 04/09/2020    ALKPHOS 265 (H) 04/09/2020    ALT 31 04/09/2020    AST 22 04/09/2020        INR RESULTS:  Lab Results   Component Value Date    INR 1.17 (H) 09/23/2019       LIPID RESULTS:  Lab Results   Component Value Date    CHOL 171 05/05/2020    HDL 66 05/05/2020    LDL 81 05/05/2020    TRIG 119 05/05/2020       IMMUNOSUPPRESSANT LEVELS:  Lab Results   Component Value Date    TACROL 6.1 02/12/2020    DOSTAC Not Provided 02/12/2020    RAPAMY 2.4 (L) 05/05/2020       No components found for: CK  Lab Results   Component Value Date    MAG 1.8 05/05/2020     Lab Results   Component Value Date    A1C 5.8 (H) 02/12/2020     Lab Results   Component Value Date    PHOS 3.3 05/05/2020     Lab Results   Component Value Date    NTBNP 10,986 (H) 11/15/2018     Lab Results   Component Value Date    SAITESTMET SA FCS 02/12/2020    SAICELL Class I 02/12/2020    QJ4EVCAAB None 02/12/2020    [id number] None 02/12/2020    SAIREPCOM  02/12/2020       Test performed by modified procedure. Serum heat inactivated and tested   by a modified ([place]) protocol including fetal calf serum addition.   High-risk, mfi >3,000. Mod-risk, mfi 500-3,000.       Lab Results   Component Value Date    SAIITESTME SA FCS 02/12/2020    SAIICELL Class II 02/12/2020    [id number] None 02/12/2020    [id number] None 02/12/2020    SAIIREPCOM  02/12/2020      Test performed by modified procedure. Serum heat inactivated and tested   by a modified ([place]) protocol including fetal calf serum addition.   High-risk, mfi >3,000. Mod-risk, mfi 500-3,000.       Lab Results   Component Value Date    CSPEC Plasma, EDTA anticoagulant 02/12/2020       Diagnostic Studies:  RHC 2/12/20      Cor angio 2/12/20  Left Main    The vessel was visualized by selective angiography and is large. There was 0% vessel disease.    Left Anterior Descending    The vessel was visualized by selective angiography and is large. There was 0% vessel disease. IVUS was performed on the LMCA and LAD vessels. The LMCA was engaged with a 6 Fr Ikari LF 3.5 GC and the patient was anticoagulated with IV UFH. A BMW was passed into the distal LAD without difficulty. The [place] [facility] IVUS was passed over the wire and manually pulled back while recording. Proximal LAD HERACLIO 0.3 Lumen 24.1 Vessel 29.8 19% area stenosis Mid LAD HERACLIO 0.3 Lumen 10.8 Vessel 13.7 21% area stenosis IVUS was performed on the vessel. Ultrasound supply: [facility] IVUS SHRT.    Left Circumflex    The vessel was visualized by selective angiography and is moderate in size. There was 0% vessel disease.    Right Coronary Artery    The vessel was visualized by selective angiography and is large. There was 0% vessel disease.        Assessment/Plan:  Mr. Larios is a 55 year old male who is s/p orthotopic heart transplant on 2/24/19 who presents to  for biopsy after recent change to IS.  He was changed from CellCept to seromatous due to mild coronary allograft  vasculopathy of the proximal LAD.  Clinically he has no signs of rejection and reports feeling well.  Lab testing is unremarkable.  He has no DSA's and last immunknow showed moderate response.  Sirolimus and tacrolimus levels are pending today.  He is due for cardiac MRI so we will attempt to do that today.    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 He is on amlodipine 5mg daily.  - Fluid status: euvolemic, off diuretics.      Serostatus: CMV D+/R+ EBV D+/R+ Toxo D-/R-     Immunosuppression:  - tacrolimus trough goal ~6  - sirolimus trough goal 6-8     PPx:  - CAV:  aspirin 81mg daily, rosuvastatin 10mg daily  - GERD:  Omeprazole (note h/o carrera's esophagus)  - Osteoporosis:  Calcium/vitamin D supplements, Fosamax     # Ulcerative Colitis  - Followed by GI. Recent colonoscopy 2/20/20.     # Hx of pseudomonas bacteremia and HCAP  Chest CT diffuse bilateral solid with peripheral groundglass pulmonary nodules/opacities and mediastinal adenopathy, BAL grew pseudomonas, fungitell was positive. He was treated with 4 weeks of IV cefepime and micafungin.    # Hx CMV  Patient was unable to afford valcyte. Dr. Shukla aware. No further need for weekly CMV levels.    # Hx cdif colitis      25 minutes spent face-to-face with patient, >50% in counseling and/or coordination of care as described above      Candida Srivastava DNP, NP-C  5/5/2020          CC

## 2020-05-07 DIAGNOSIS — Z53.9 ERRONEOUS ENCOUNTER--DISREGARD: ICD-10-CM

## 2020-05-07 DIAGNOSIS — Z86.2 PERSONAL HISTORY OF DISEASES OF BLOOD AND BLOOD-FORMING ORGANS: ICD-10-CM

## 2020-05-07 LAB — BMT WORKUP IRRADIATED BLOOD REQUIRED: NORMAL

## 2020-05-11 DIAGNOSIS — I10 BENIGN ESSENTIAL HYPERTENSION: ICD-10-CM

## 2020-05-11 DIAGNOSIS — Z94.1 HEART REPLACED BY TRANSPLANT (H): ICD-10-CM

## 2020-05-11 RX ORDER — AMLODIPINE BESYLATE 5 MG/1
5 TABLET ORAL DAILY
Qty: 30 TABLET | Refills: 11 | Status: SHIPPED | OUTPATIENT
Start: 2020-05-11 | End: 2020-09-11

## 2020-05-11 RX ORDER — AMLODIPINE BESYLATE 5 MG/1
TABLET ORAL
Qty: 30 TABLET | Refills: 2 | Status: SHIPPED | OUTPATIENT
Start: 2020-05-11 | End: 2020-09-16

## 2020-05-11 RX ORDER — ROSUVASTATIN CALCIUM 10 MG/1
10 TABLET, COATED ORAL DAILY
Qty: 30 TABLET | Refills: 11 | Status: SHIPPED | OUTPATIENT
Start: 2020-05-11 | End: 2021-05-17

## 2020-05-11 RX ORDER — BALSALAZIDE DISODIUM 750 MG/1
CAPSULE ORAL
Qty: 270 CAPSULE | Refills: 3 | Status: ON HOLD | OUTPATIENT
Start: 2020-05-11 | End: 2021-03-17

## 2020-05-11 NOTE — TELEPHONE ENCOUNTER
Last Clinic Visit: 11/26/2019  [facility]  Creatinine   Date Value Ref Range Status   05/05/2020 1.21 0.66 - 1.25 mg/dL Final

## 2020-05-12 ENCOUNTER — RECORDS - HEALTHEAST (OUTPATIENT)
Dept: HEALTH INFORMATION MANAGEMENT | Facility: CLINIC | Age: 55
End: 2020-05-12

## 2020-05-12 RX ORDER — ROSUVASTATIN CALCIUM 10 MG/1
TABLET, COATED ORAL
Qty: 90 TABLET | Refills: 1 | Status: SHIPPED | OUTPATIENT
Start: 2020-05-12 | End: 2020-09-16

## 2020-05-12 NOTE — TELEPHONE ENCOUNTER
After discussing the patient's symptoms of fatigue, aches, and cough with Dr. Shukla, he thinks with recent diagnosis of cmv and cdiff, the patient should be evaluated in the ER. Pt updated and report called to ER.    Kaya Glass with [facility] called because they got the letter of clearance for patient's surgery on Friday but they are still needing a copy of today's office visit note that has more specific information faxed to [phone]. Please fax over as soon possible. Thanks.

## 2020-05-18 ENCOUNTER — COMMUNICATION - HEALTHEAST (OUTPATIENT)
Dept: LAB | Facility: CLINIC | Age: 55
End: 2020-05-18

## 2020-05-19 ENCOUNTER — AMBULATORY - HEALTHEAST (OUTPATIENT)
Dept: LAB | Facility: CLINIC | Age: 55
End: 2020-05-19

## 2020-05-19 DIAGNOSIS — Z94.1 TRANSPLANTED HEART (H): ICD-10-CM

## 2020-05-19 LAB
ANION GAP SERPL CALCULATED.3IONS-SCNC: 10 MMOL/L (ref 5–18)
BUN SERPL-MCNC: 28 MG/DL (ref 8–22)
CALCIUM SERPL-MCNC: 8.8 MG/DL (ref 8.5–10.5)
CHLORIDE BLD-SCNC: 108 MMOL/L (ref 98–107)
CO2 SERPL-SCNC: 22 MMOL/L (ref 22–31)
CREAT SERPL-MCNC: 1.38 MG/DL (ref 0.7–1.3)
ERYTHROCYTE [DISTWIDTH] IN BLOOD BY AUTOMATED COUNT: 13 % (ref 11–14.5)
GFR SERPL CREATININE-BSD FRML MDRD: 53 ML/MIN/1.73M2
GLUCOSE BLD-MCNC: 114 MG/DL (ref 70–125)
HCT VFR BLD AUTO: 31.5 % (ref 40–54)
HGB BLD-MCNC: 10.3 G/DL (ref 14–18)
MCH RBC QN AUTO: 23.8 PG (ref 27–34)
MCHC RBC AUTO-ENTMCNC: 32.8 G/DL (ref 32–36)
MCV RBC AUTO: 73 FL (ref 80–100)
PLATELET # BLD AUTO: 259 THOU/UL (ref 140–440)
PMV BLD AUTO: 6.8 FL (ref 7–10)
POTASSIUM BLD-SCNC: 4.6 MMOL/L (ref 3.5–5)
RBC # BLD AUTO: 4.33 MILL/UL (ref 4.4–6.2)
SODIUM SERPL-SCNC: 140 MMOL/L (ref 136–145)
WBC: 6.3 THOU/UL (ref 4–11)

## 2020-05-20 LAB
SIROLIMUS BLD-MCNC: 3.7 UG/L (ref 5–15)
TME LAST DOSE: ABNORMAL H

## 2020-05-21 DIAGNOSIS — Z94.1 TRANSPLANTED HEART (H): ICD-10-CM

## 2020-05-21 LAB — SIROLIMUS LEVEL: 3.7

## 2020-05-21 RX ORDER — SIROLIMUS 0.5 MG/1
TABLET, FILM COATED ORAL
Qty: 720 TABLET | Refills: 3 | Status: SHIPPED | OUTPATIENT
Start: 2020-05-21 | End: 2020-06-05

## 2020-06-01 ENCOUNTER — AMBULATORY - HEALTHEAST (OUTPATIENT)
Dept: LAB | Facility: CLINIC | Age: 55
End: 2020-06-01

## 2020-06-01 DIAGNOSIS — Z94.1 TRANSPLANTED HEART (H): ICD-10-CM

## 2020-06-02 ENCOUNTER — AMBULATORY - HEALTHEAST (OUTPATIENT)
Dept: LAB | Facility: CLINIC | Age: 55
End: 2020-06-02

## 2020-06-02 DIAGNOSIS — Z94.1 TRANSPLANTED HEART (H): ICD-10-CM

## 2020-06-02 LAB
ANION GAP SERPL CALCULATED.3IONS-SCNC: 10 MMOL/L (ref 5–18)
BUN SERPL-MCNC: 34 MG/DL (ref 8–22)
CALCIUM SERPL-MCNC: 8.8 MG/DL (ref 8.5–10.5)
CHLORIDE BLD-SCNC: 108 MMOL/L (ref 98–107)
CO2 SERPL-SCNC: 19 MMOL/L (ref 22–31)
CREAT SERPL-MCNC: 1.41 MG/DL (ref 0.7–1.3)
ERYTHROCYTE [DISTWIDTH] IN BLOOD BY AUTOMATED COUNT: 13.8 % (ref 11–14.5)
GFR SERPL CREATININE-BSD FRML MDRD: 52 ML/MIN/1.73M2
GLUCOSE BLD-MCNC: 137 MG/DL (ref 70–125)
HCT VFR BLD AUTO: 30.3 % (ref 40–54)
HGB BLD-MCNC: 9.9 G/DL (ref 14–18)
MCH RBC QN AUTO: 22.8 PG (ref 27–34)
MCHC RBC AUTO-ENTMCNC: 32.7 G/DL (ref 32–36)
MCV RBC AUTO: 70 FL (ref 80–100)
PLATELET # BLD AUTO: 202 THOU/UL (ref 140–440)
PMV BLD AUTO: 6.9 FL (ref 7–10)
POTASSIUM BLD-SCNC: 4.4 MMOL/L (ref 3.5–5)
RBC # BLD AUTO: 4.34 MILL/UL (ref 4.4–6.2)
SODIUM SERPL-SCNC: 137 MMOL/L (ref 136–145)
WBC: 5.7 THOU/UL (ref 4–11)

## 2020-06-03 LAB
ERYTHROCYTE [DISTWIDTH] IN BLOOD BY AUTOMATED COUNT: 13.8 %
HCT VFR BLD AUTO: 30.3 %
HEMOGLOBIN: 9.9 G/DL (ref 13.3–17.7)
MCH RBC QN AUTO: 22.8 PG
MCHC RBC AUTO-ENTMCNC: 32.7 G/DL
MCV RBC AUTO: 70 FL
PLATELET # BLD AUTO: 202 10^9/L
PMV BLD: 6.9 FL
RBC # BLD AUTO: 4.34 10^12/L
SIROLIMUS BLD-MCNC: 5.7 UG/L (ref 5–15)
TME LAST DOSE: NORMAL H
WBC # BLD AUTO: 5.7 10^9/L

## 2020-06-05 ENCOUNTER — TELEPHONE (OUTPATIENT)
Dept: TRANSPLANT | Facility: CLINIC | Age: 55
End: 2020-06-05

## 2020-06-05 DIAGNOSIS — Z94.1 TRANSPLANTED HEART (H): ICD-10-CM

## 2020-06-05 RX ORDER — SIROLIMUS 0.5 MG/1
TABLET, FILM COATED ORAL
Qty: 64 TABLET | Refills: 3 | Status: SHIPPED | OUTPATIENT
Start: 2020-06-05 | End: 2020-06-12

## 2020-06-05 NOTE — TELEPHONE ENCOUNTER
Pt in the donut hole and can't afford his sirolimus medication. [facility] contacted at [phone] for a tier exception. SW also contacted to see if there is any additional funding or programs we can enroll the patient in.

## 2020-06-09 ENCOUNTER — TELEPHONE (OUTPATIENT)
Dept: CARE COORDINATION | Facility: CLINIC | Age: 55
End: 2020-06-09

## 2020-06-09 NOTE — TELEPHONE ENCOUNTER
Transplant Social Work Services Phone Call      Data: SW notified that pt had reached his Medicare donut hole and is having difficulty paying for his sirolimus medication. Called pt and he was working on applying for the [employer] patient assistance program online.     Intervention: Supportive Phone Call  Assessment: Pt is independently applying for PAP at the moment. Will let writer know if he runs into problems or needs further assistance.   Education provided by [facility]: PAP  Plan:    Pt to let writer know if he has any further questions or concerns.

## 2020-06-12 DIAGNOSIS — Z94.1 TRANSPLANTED HEART (H): ICD-10-CM

## 2020-06-12 RX ORDER — SIROLIMUS 0.5 MG/1
TABLET, FILM COATED ORAL
Qty: 64 TABLET | Refills: 3 | Status: SHIPPED | OUTPATIENT
Start: 2020-06-12 | End: 2020-09-17

## 2020-06-15 ENCOUNTER — AMBULATORY - HEALTHEAST (OUTPATIENT)
Dept: LAB | Facility: CLINIC | Age: 55
End: 2020-06-15

## 2020-06-15 DIAGNOSIS — Z94.1 TRANSPLANTED HEART (H): ICD-10-CM

## 2020-06-15 LAB
ANION GAP SERPL CALCULATED.3IONS-SCNC: 10 MMOL/L (ref 5–18)
BUN SERPL-MCNC: 23 MG/DL (ref 8–22)
CALCIUM SERPL-MCNC: 9 MG/DL (ref 8.5–10.5)
CHLORIDE BLD-SCNC: 107 MMOL/L (ref 98–107)
CO2 SERPL-SCNC: 22 MMOL/L (ref 22–31)
CREAT SERPL-MCNC: 1.27 MG/DL (ref 0.7–1.3)
ERYTHROCYTE [DISTWIDTH] IN BLOOD BY AUTOMATED COUNT: 14 % (ref 11–14.5)
GFR SERPL CREATININE-BSD FRML MDRD: 58 ML/MIN/1.73M2
GLUCOSE BLD-MCNC: 97 MG/DL (ref 70–125)
HCT VFR BLD AUTO: 32.2 % (ref 40–54)
HGB BLD-MCNC: 10.9 G/DL (ref 14–18)
MCH RBC QN AUTO: 23.4 PG (ref 27–34)
MCHC RBC AUTO-ENTMCNC: 33.9 G/DL (ref 32–36)
MCV RBC AUTO: 69 FL (ref 80–100)
PLATELET # BLD AUTO: 204 THOU/UL (ref 140–440)
PMV BLD AUTO: 7.4 FL (ref 7–10)
POTASSIUM BLD-SCNC: 4.3 MMOL/L (ref 3.5–5)
RBC # BLD AUTO: 4.67 MILL/UL (ref 4.4–6.2)
SODIUM SERPL-SCNC: 139 MMOL/L (ref 136–145)
WBC: 5.7 THOU/UL (ref 4–11)

## 2020-06-16 LAB
ERYTHROCYTE [DISTWIDTH] IN BLOOD BY AUTOMATED COUNT: 14 %
HCT VFR BLD AUTO: 32.2 %
HEMOGLOBIN: 10.9 G/DL (ref 13.3–17.7)
MCH RBC QN AUTO: 23.4 PG
MCHC RBC AUTO-ENTMCNC: 33.9 G/DL
MCV RBC AUTO: 69 FL
PLATELET # BLD AUTO: 204 10^9/L
RBC # BLD AUTO: 4.67 10^12/L
SIROLIMUS BLD-MCNC: 6.1 UG/L (ref 5–15)
TME LAST DOSE: NORMAL H
WBC # BLD AUTO: 5.7 10^9/L

## 2020-06-17 LAB — SIROLIMUS LEVEL: 6.1

## 2020-07-26 NOTE — PLAN OF CARE
FOCUS/GOAL  Bladder management, Pain management, Wound care management and Mobility    ASSESSMENT, INTERVENTIONS AND CONTINUING PLAN FOR GOAL:  Pt is alert and oriented x4, VSS, denies SOB or nausea. Pt states he has mild generalized pain/discomfort that is tolerable without any interventions. Pt has been cooperative with staff this evening. Using call light appropriately for assistance to bathroom. Pt adhering to fluid restriction. Pt voiding using urinal in bathroom to measure amounts. Dressing on right chest changed, small amount of bloody drainage,area is almost scabbed over. Pt looking into options for discharge.      Subjective   Chief Complaint   Patient presents with   • Diabetes     to discuss labs        History of Present Illness   59 y.o. female presents to discuss newly diagnosed diabetes. +FH diabetes in her MGM. Denies polyuria, dipsia or phagia. Notes fatigue. Has been taking care of her granddaughter who is a Type 1 diabetic for 14 years. Had visit with optometrist in March. Denies CP or dyspnea. Taking her Lisinopril daily. Knows she needs to make changes and lose weight.     There is no problem list on file for this patient.      No Known Allergies    Current Outpatient Medications on File Prior to Visit   Medication Sig Dispense Refill   • naproxen sodium (ALEVE) 220 MG tablet Take 220 mg by mouth 2 (Two) Times a Day As Needed.     • [DISCONTINUED] lisinopril (PRINIVIL,ZESTRIL) 10 MG tablet Take 1 tablet by mouth Daily. 30 tablet 1     No current facility-administered medications on file prior to visit.        History reviewed. No pertinent past medical history.    Family History   Problem Relation Age of Onset   • Hypertension Mother    • Stroke Mother    • Throat cancer Father    • Diabetes Maternal Grandmother        Social History     Socioeconomic History   • Marital status:      Spouse name: Not on file   • Number of children: Not on file   • Years of education: Not on file   • Highest education level: Not on file   Tobacco Use   • Smoking status: Former Smoker   • Smokeless tobacco: Never Used   • Tobacco comment: Less than 1/2 ppd for 18 months 40 years ago.    Substance and Sexual Activity   • Alcohol use: Yes     Frequency: Monthly or less     Drinks per session: 1 or 2     Comment: social    • Drug use: Never   • Sexual activity: Not Currently     Partners: Male       Past Surgical History:   Procedure Laterality Date   • HYSTERECTOMY  2005    Dr. Vaughn   • KNEE MENISCAL REPAIR Left     Dr. Abel   • REPLACEMENT TOTAL KNEE Right     Dr. Patterson       The following portions of the patient's  "history were reviewed and updated as appropriate: problem list, allergies, current medications, past medical history, past family history and past social history.    Review of Systems   Respiratory: Negative for shortness of breath.    Cardiovascular: Negative for chest pain.       Immunization History   Administered Date(s) Administered   • Hepatitis A 07/28/2020   • Influenza Split Preservative Free ID 12/10/2012   • Pneumococcal Polysaccharide (PPSV23) 07/28/2020   • Tdap 07/13/2020       Objective   Vitals:    07/28/20 0855 07/28/20 0932   BP:  150/100   Pulse:  80   Resp:  16   Temp: 97.3 °F (36.3 °C)    Weight: 135 kg (297 lb)    Height: 170.2 cm (67\")      Body mass index is 46.52 kg/m².  Physical Exam   Constitutional: She is oriented to person, place, and time. She appears well-developed and well-nourished.   HENT:   Head: Normocephalic and atraumatic.   Cardiovascular: Normal rate, regular rhythm, S1 normal, S2 normal and normal heart sounds.   Pulmonary/Chest: Effort normal and breath sounds normal.    Oanh had a diabetic foot exam performed today.   During the foot exam she had a monofilament test performed (No deficits. ).  Neurological: She is alert and oriented to person, place, and time.   Skin: Skin is warm and dry.   Psychiatric: She has a normal mood and affect.   Vitals reviewed.      Procedures    Assessment/Plan   Oanh was seen today for diabetes.    Diagnoses and all orders for this visit:    Type 2 diabetes mellitus without complication, without long-term current use of insulin (CMS/[facility])  Comments:  New diagnosis. A1C 7.4%. Diabetes education advised. Schedule with Dr. Cast--eyes. PVX and HAVRIX today. HBV series at follow up. Weight loss adivsed.   Orders:  -     Comprehensive Metabolic Panel; Future  -     Hemoglobin A1c; Future  -     Lipid Panel With / Chol / HDL Ratio; Future  -     Microalbumin / Creatinine Urine Ratio - Urine, Clean Catch; Future  -     Ambulatory Referral to " Diabetic Education  -     metFORMIN (Glucophage) 500 MG tablet; Take 1 tablet by mouth 2 (Two) Times a Day With Meals.  -     rosuvastatin (Crestor) 5 MG tablet; Take 1 tablet by mouth Daily.    Abnormal TSH  Comments:  Free T4 normla--recheck TSH and free T4 in 6 weeks.   Orders:  -     TSH; Future  -     T4, free; Future    Need for 23-polyvalent pneumococcal polysaccharide vaccine  -     Pneumococcal Polysaccharide Vaccine 23-Valent Greater Than or Equal To 3yo Subcutaneous / IM    Essential hypertension  Comments:  Not controlled. Increase Lisinopril to 20 mg daily.   Orders:  -     lisinopril (PRINIVIL,ZESTRIL) 20 MG tablet; Take 1 tablet by mouth Daily.    Need for hepatitis A vaccination  Comments:  First HAVRIX in 2018 or [facility] at [facility]--will bring us a copy to next visit.   Orders:  -     Hepatitis A Vaccine Adult IM    Morbid obesity  Comments:  Advised weight loss via dietary changes and 150 minutes aerobic activity.     Reviewed potential side effects of Crestor and Metformin. She will let us know should this be an issue. Will see her back in a month.      Records reviewed include previous OV with myself as well as labs.     Return in about 3 months (around 10/28/2020) for Lab B4 FUP.

## 2020-08-05 ENCOUNTER — RECORDS - HEALTHEAST (OUTPATIENT)
Dept: ADMINISTRATIVE | Facility: OTHER | Age: 55
End: 2020-08-05

## 2020-08-10 DIAGNOSIS — Z94.1 TRANSPLANTED HEART (H): Primary | ICD-10-CM

## 2020-08-18 DIAGNOSIS — R11.0 NAUSEA: ICD-10-CM

## 2020-08-18 NOTE — TELEPHONE ENCOUNTER
omeprazole (PRILOSEC) 40 MG DR capsule       HISTORICAL ONLY ON MEDICATION LIST    Last Office Visit : 11-26-19  Future Office visit:  NONE    Routing refill request to provider for review/approval because:  Medication is reported/historical      Kathleen M Doege RN

## 2020-08-19 RX ORDER — OMEPRAZOLE 40 MG/1
40 CAPSULE, DELAYED RELEASE ORAL DAILY
Qty: 90 CAPSULE | Refills: 0 | Status: CANCELLED | OUTPATIENT
Start: 2020-08-19

## 2020-09-04 ENCOUNTER — PRE VISIT (OUTPATIENT)
Dept: CARDIOLOGY | Facility: CLINIC | Age: 55
End: 2020-09-04

## 2020-09-04 DIAGNOSIS — Z94.1 TRANSPLANTED HEART (H): Primary | ICD-10-CM

## 2020-09-11 DIAGNOSIS — Z94.1 TRANSPLANTED HEART (H): Primary | ICD-10-CM

## 2020-09-11 DIAGNOSIS — Z13.220 LIPID SCREENING: ICD-10-CM

## 2020-09-16 ENCOUNTER — AMBULATORY - HEALTHEAST (OUTPATIENT)
Dept: LAB | Facility: HOSPITAL | Age: 55
End: 2020-09-16

## 2020-09-16 ENCOUNTER — OFFICE VISIT (OUTPATIENT)
Dept: CARDIOLOGY | Facility: CLINIC | Age: 55
End: 2020-09-16
Attending: INTERNAL MEDICINE
Payer: COMMERCIAL

## 2020-09-16 ENCOUNTER — RECORDS - HEALTHEAST (OUTPATIENT)
Dept: ADMINISTRATIVE | Facility: OTHER | Age: 55
End: 2020-09-16

## 2020-09-16 VITALS
SYSTOLIC BLOOD PRESSURE: 149 MMHG | HEART RATE: 105 BPM | WEIGHT: 169 LBS | DIASTOLIC BLOOD PRESSURE: 91 MMHG | BODY MASS INDEX: 25.7 KG/M2 | OXYGEN SATURATION: 99 %

## 2020-09-16 DIAGNOSIS — Z94.1 TRANSPLANTED HEART (H): ICD-10-CM

## 2020-09-16 DIAGNOSIS — Z23 ENCOUNTER FOR IMMUNIZATION: ICD-10-CM

## 2020-09-16 DIAGNOSIS — E03.9 HYPOTHYROIDISM: ICD-10-CM

## 2020-09-16 DIAGNOSIS — I10 BENIGN ESSENTIAL HYPERTENSION: ICD-10-CM

## 2020-09-16 DIAGNOSIS — Z00.00 PREVENTATIVE HEALTH CARE: Primary | ICD-10-CM

## 2020-09-16 DIAGNOSIS — E03.8 OTHER SPECIFIED HYPOTHYROIDISM: ICD-10-CM

## 2020-09-16 DIAGNOSIS — Z13.220 LIPID SCREENING: ICD-10-CM

## 2020-09-16 LAB
ALBUMIN SERPL-MCNC: 3.7 G/DL (ref 3.4–5)
ALBUMIN UR-MCNC: 30 MG/DL
ALP SERPL-CCNC: 167 U/L (ref 40–150)
ALT SERPL W P-5'-P-CCNC: 38 U/L (ref 0–70)
ANION GAP SERPL CALCULATED.3IONS-SCNC: 9 MMOL/L (ref 3–14)
APPEARANCE UR: CLEAR
AST SERPL W P-5'-P-CCNC: 22 U/L (ref 0–45)
BILIRUB SERPL-MCNC: 0.4 MG/DL (ref 0.2–1.3)
BILIRUB UR QL STRIP: NEGATIVE
BUN SERPL-MCNC: 35 MG/DL (ref 7–30)
CALCIUM SERPL-MCNC: 8.9 MG/DL (ref 8.5–10.1)
CHLORIDE SERPL-SCNC: 108 MMOL/L (ref 94–109)
CHOLEST SERPL-MCNC: 205 MG/DL
CK SERPL-CCNC: 139 U/L (ref 30–300)
CO2 SERPL-SCNC: 22 MMOL/L (ref 20–32)
COLOR UR AUTO: YELLOW
CREAT SERPL-MCNC: 1.92 MG/DL (ref 0.66–1.25)
ERYTHROCYTE [DISTWIDTH] IN BLOOD BY AUTOMATED COUNT: 15.1 % (ref 10–15)
GFR SERPL CREATININE-BSD FRML MDRD: 38 ML/MIN/{1.73_M2}
GLUCOSE SERPL-MCNC: 119 MG/DL (ref 70–99)
GLUCOSE UR STRIP-MCNC: NEGATIVE MG/DL
HCT VFR BLD AUTO: 36.3 % (ref 40–53)
HDLC SERPL-MCNC: 69 MG/DL
HGB BLD-MCNC: 11.4 G/DL (ref 13.3–17.7)
HGB UR QL STRIP: NEGATIVE
HYALINE CASTS #/AREA URNS LPF: 1 /LPF (ref 0–2)
KETONES UR STRIP-MCNC: NEGATIVE MG/DL
LDLC SERPL CALC-MCNC: 79 MG/DL
LEUKOCYTE ESTERASE UR QL STRIP: NEGATIVE
MAGNESIUM SERPL-MCNC: 1.7 MG/DL (ref 1.6–2.3)
MCH RBC QN AUTO: 23.5 PG (ref 26.5–33)
MCHC RBC AUTO-ENTMCNC: 31.4 G/DL (ref 31.5–36.5)
MCV RBC AUTO: 75 FL (ref 78–100)
NITRATE UR QL: NEGATIVE
NONHDLC SERPL-MCNC: 136 MG/DL
PH UR STRIP: 6 PH (ref 5–7)
PHOSPHATE SERPL-MCNC: 3.6 MG/DL (ref 2.5–4.5)
PLATELET # BLD AUTO: 195 10E9/L (ref 150–450)
POTASSIUM SERPL-SCNC: 4.1 MMOL/L (ref 3.4–5.3)
PROT SERPL-MCNC: 8.2 G/DL (ref 6.8–8.8)
RBC # BLD AUTO: 4.86 10E12/L (ref 4.4–5.9)
RBC #/AREA URNS AUTO: 1 /HPF (ref 0–2)
SIROLIMUS BLD-MCNC: 9.1 UG/L (ref 5–15)
SODIUM SERPL-SCNC: 139 MMOL/L (ref 133–144)
SOURCE: ABNORMAL
SP GR UR STRIP: 1.02 (ref 1–1.03)
T4 FREE SERPL-MCNC: 1.5 NG/DL (ref 0.76–1.46)
TACROLIMUS BLD-MCNC: 6.3 UG/L (ref 5–15)
TACROLIMUS LAST DOSE: NORMAL
TME LAST DOSE: NORMAL H
TRIGL SERPL-MCNC: 283 MG/DL
TSH SERPL DL<=0.005 MIU/L-ACNC: <0.01 MU/L (ref 0.4–4)
UROBILINOGEN UR STRIP-MCNC: 0 MG/DL (ref 0–2)
WBC # BLD AUTO: 5.3 10E9/L (ref 4–11)
WBC #/AREA URNS AUTO: 1 /HPF (ref 0–5)

## 2020-09-16 PROCEDURE — 84100 ASSAY OF PHOSPHORUS: CPT | Performed by: INTERNAL MEDICINE

## 2020-09-16 PROCEDURE — G0463 HOSPITAL OUTPT CLINIC VISIT: HCPCS | Mod: 25,ZF

## 2020-09-16 PROCEDURE — 90686 IIV4 VACC NO PRSV 0.5 ML IM: CPT | Mod: ZF | Performed by: INTERNAL MEDICINE

## 2020-09-16 PROCEDURE — 25000128 H RX IP 250 OP 636: Mod: ZF | Performed by: INTERNAL MEDICINE

## 2020-09-16 PROCEDURE — 80053 COMPREHEN METABOLIC PANEL: CPT | Performed by: INTERNAL MEDICINE

## 2020-09-16 PROCEDURE — G0463 HOSPITAL OUTPT CLINIC VISIT: HCPCS | Mod: 25

## 2020-09-16 PROCEDURE — 84439 ASSAY OF FREE THYROXINE: CPT | Performed by: INTERNAL MEDICINE

## 2020-09-16 PROCEDURE — 99214 OFFICE O/P EST MOD 30 MIN: CPT | Mod: ZP | Performed by: INTERNAL MEDICINE

## 2020-09-16 PROCEDURE — 84443 ASSAY THYROID STIM HORMONE: CPT | Performed by: INTERNAL MEDICINE

## 2020-09-16 PROCEDURE — 36415 COLL VENOUS BLD VENIPUNCTURE: CPT | Performed by: INTERNAL MEDICINE

## 2020-09-16 PROCEDURE — 83735 ASSAY OF MAGNESIUM: CPT | Performed by: INTERNAL MEDICINE

## 2020-09-16 PROCEDURE — 85027 COMPLETE CBC AUTOMATED: CPT | Performed by: INTERNAL MEDICINE

## 2020-09-16 PROCEDURE — 81001 URINALYSIS AUTO W/SCOPE: CPT | Performed by: INTERNAL MEDICINE

## 2020-09-16 PROCEDURE — 80061 LIPID PANEL: CPT | Performed by: INTERNAL MEDICINE

## 2020-09-16 PROCEDURE — 82550 ASSAY OF CK (CPK): CPT | Performed by: INTERNAL MEDICINE

## 2020-09-16 PROCEDURE — 80195 ASSAY OF SIROLIMUS: CPT | Performed by: INTERNAL MEDICINE

## 2020-09-16 PROCEDURE — G0008 ADMIN INFLUENZA VIRUS VAC: HCPCS

## 2020-09-16 PROCEDURE — 86352 CELL FUNCTION ASSAY W/STIM: CPT | Performed by: INTERNAL MEDICINE

## 2020-09-16 RX ORDER — AMLODIPINE BESYLATE 5 MG/1
10 TABLET ORAL DAILY
Qty: 60 TABLET | Refills: 11 | Status: SHIPPED | OUTPATIENT
Start: 2020-09-16 | End: 2021-09-10

## 2020-09-16 RX ADMIN — INFLUENZA A VIRUS A/GUANGDONG-MAONAN/SWL1536/2019 CNIC-1909 (H1N1) ANTIGEN (FORMALDEHYDE INACTIVATED), INFLUENZA A VIRUS A/HONG KONG/2671/2019 (H3N2) ANTIGEN (FORMALDEHYDE INACTIVATED), INFLUENZA B VIRUS B/PHUKET/3073/2013 ANTIGEN (FORMALDEHYDE INACTIVATED), AND INFLUENZA B VIRUS B/WASHINGTON/02/2019 ANTIGEN (FORMALDEHYDE INACTIVATED) 0.5 ML: 15; 15; 15; 15 INJECTION, SUSPENSION INTRAMUSCULAR at 15:25

## 2020-09-16 NOTE — NURSING NOTE
Chief Complaint   Patient presents with     Follow Up     heart tx f/u     Vitals were taken and medications were reconciled.     JULISA Linares

## 2020-09-16 NOTE — LETTER
9/16/2020      RE: Everton Larios  [address] [address]       Dear Colleague,    Thank you for the opportunity to participate in the care of your patient, Everton Larios, at the [facility] HEART [facility] at [facility]. Please see a copy of my visit note below.        HPI:  Everton Larios is a 55 year old male with a history of ASD (s/p repair in childhood), AFib/AF (s/p ablation), NICM, diastolic dysfunction, alcohol abuse, Pierce's esophagus, ulcerative colitis, GIB (s/p splenic embolization), and hypothyroidism, now s/p OHT and bypass of persistent left SVC to right atrial appendage 2/24/19 who presents to clinic for ongoing evaluation and management.    His postoperative course was c/b shock due to RV dysfunction requiring IABP support, small pneumoperitoneum (clinically insignificant), chest wall hematoma (former ICD site, s/p evacuation and drain placement 3/31/19), HCAP/klebsiella pneumonia, and FRANKLIN (required short-term HD, now recovered).  His biopsy 3/25/19 showed mild AMR1 with some staining for c4d. Repeat biopsy 3/28 showed improved AMR and less reactive capillary staining, and subsequent biopsies have now shown resolution of AMR and improved capillary staining.      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  Today patient reports that has been feeling well overall.  Notes he hasn't felt this good in many years.  He is back to working.  He denies any chest pain or pressure, sob/andrade, orthopnea, pnd, palpitations, syncope/presyncope or marielle.  He any denies fever/chills, sore throat, swollen lymph nodes, productive cough, abdominal pain, or N/V/D.  He also denies any problems with his medications and reports compliance.      Past Medical History:   Diagnosis Date     Alcohol abuse       Pierce's esophagus 10/4/2018     Chronic rhinitis 10/4/2018     Chronic systolic heart failure (H) 10/4/2018     Depression 10/4/2018     Diastolic dysfunction      DJD (degenerative joint disease) - neck 10/4/2018     H/O congenital atrial septal defect (ASD) repair at age 3 10/4/2018     Hypothyroidism due to medication 10/4/2018     ICD, [employer] 2008; gen change 2/2018 10/4/2018     Intermittent asthma without complication 10/4/2018     Nonischemic cardiomyopathy (H) 10/4/2018     On amiodarone therapy 10/4/2018     Paroxysmal atrial fibrillation (H) 10/4/2018     S/P ablation of atrial fibrillation 10/4/2018     Systolic heart failure (H)      Ulcerative colitis (H) 2005     Ventricular tachycardia (H) 10/4/2018       Past Surgical History:   Procedure Laterality Date     AICD, DUAL CHAMBER       BRONCHOSCOPY (RIGID OR FLEXIBLE), DIAGNOSTIC N/A 4/30/2019    Procedure: BRONCHOSCOPY, WITH BAL;  Surgeon: Margot Chiang MD;  Location:  GI     COLONOSCOPY N/A 2/20/2020    Procedure: COLONOSCOPY, WITH POLYPECTOMY AND BIOPSY;  Surgeon: Ranjeet Cooper MD;  Location:  GI     CV CORONARY ANGIOGRAM N/A 2/12/2020    Procedure: CV CORONARY ANGIOGRAM;  Surgeon: Isiah Mcallister MD;  Location:  HEART CARDIAC CATH LAB     CV HEART BIOPSY N/A 3/4/2019    Procedure: Heart Biopsy;  Surgeon: Abhijeet Titus MD;  Location:  HEART CARDIAC CATH LAB     CV HEART BIOPSY N/A 3/25/2019    Procedure: Heart Biopsy;  Surgeon: Yves Rodrigues MD;  Location:  HEART CARDIAC CATH LAB     CV HEART BIOPSY N/A 3/28/2019    Procedure: Heart Cath Heart Biopsy;  Surgeon: Yves Rodrigues MD;  Location:  HEART CARDIAC CATH LAB     CV HEART BIOPSY N/A 4/10/2019    Procedure: CV HEART BIOPSY;  Surgeon: Isiah Mcallister MD;  Location:  HEART CARDIAC CATH LAB     CV HEART BIOPSY N/A 4/24/2019    Procedure: CV HEART BIOPSY;  Surgeon: Isiah Mcallister MD;  Location:  HEART CARDIAC CATH LAB      CV HEART BIOPSY N/A 5/8/2019    Procedure: CV HEART BIOPSY;  Surgeon: Isiah Mcallister MD;  Location:  HEART CARDIAC CATH LAB     CV HEART BIOPSY N/A 6/4/2019    Procedure: CV HEART BIOPSY;  Surgeon: Alton Solomon MD;  Location: U HEART CARDIAC CATH LAB     CV HEART BIOPSY N/A 5/22/2019    Procedure: CV HEART BIOPSY;  Surgeon: Yves Rodrigues MD;  Location: U HEART CARDIAC CATH LAB     CV HEART BIOPSY N/A 7/17/2019    Procedure: CV HEART BIOPSY;  Surgeon: Isiah Mcallister MD;  Location:  HEART CARDIAC CATH LAB     CV HEART BIOPSY N/A 8/13/2019    Procedure: CV HEART BIOPSY;  Surgeon: Jackson Patten MD;  Location:  HEART CARDIAC CATH LAB     CV HEART BIOPSY N/A 10/15/2019    Procedure: CV HEART BIOPSY;  Surgeon: Santino Mckeon MD;  Location:  HEART CARDIAC CATH LAB     CV HEART BIOPSY N/A 2/12/2020    Procedure: CV HEART BIOPSY;  Surgeon: Isiah Mcallister MD;  Location:  HEART CARDIAC CATH LAB     CV HEART BIOPSY N/A 5/5/2020    Procedure: CV HEART BIOPSY;  Surgeon: Yves Rodrigues MD;  Location:  HEART CARDIAC CATH LAB     CV INTRA-AORTIC BALLOON PUMP INSERTION N/A 2/18/2019    Procedure: Intra-Aortic Balloon;  Surgeon: Alton Solomon MD;  Location:  HEART CARDIAC CATH LAB     CV INTRA-AORTIC BALLOON PUMP INSERTION N/A 2/20/2019    Procedure: Replace subclavian IABP;  Surgeon: Yves Rodrigues MD;  Location:  HEART CARDIAC CATH LAB     CV INTRAVASULAR ULTRASOUND N/A 2/12/2020    Procedure: CV INTRAVASCULAR ULTRASOUND;  Surgeon: Isiah Mcallister MD;  Location:  HEART CARDIAC CATH LAB     CV LEFT HEART CATH N/A 3/19/2019    Procedure: Left Heart Cath;  Surgeon: Yves Rodrigues MD;  Location:  HEART CARDIAC CATH LAB     CV LEFT HEART CATH N/A 2/12/2020    Procedure: Left Heart Cath;  Surgeon: Isiah Mcallister MD;  Location:  HEART CARDIAC CATH LAB     CV RIGHT HEART CATH N/A 3/19/2019    Procedure: RHC/HBx - Femoral access for 3/19  selvin Bansal MD;  Surgeon: Yves Rodrigues MD;  Location:  HEART CARDIAC CATH LAB     CV RIGHT HEART CATH N/A 3/25/2019    Procedure: Right Heart Cath;  Surgeon: Yves Rodrigues MD;  Location:  HEART CARDIAC CATH LAB     CV RIGHT HEART CATH N/A 3/28/2019    Procedure: Heart Cath Right Heart Cath;  Surgeon: Yves Rodrigues MD;  Location:  HEART CARDIAC CATH LAB     CV RIGHT HEART CATH N/A 4/24/2019    Procedure: CV RIGHT HEART CATH;  Surgeon: Isiah Mcallister MD;  Location:  HEART CARDIAC CATH LAB     CV RIGHT HEART CATH N/A 3/11/2019    Procedure: ADD ON RHC/HBX;  Surgeon: Alton Solomon MD;  Location:  HEART CARDIAC CATH LAB     CV RIGHT HEART CATH N/A 6/4/2019    Procedure: CV RIGHT HEART CATH;  Surgeon: Alton Solomon MD;  Location:  HEART CARDIAC CATH LAB     CV RIGHT HEART CATH N/A 5/22/2019    Procedure: CV RIGHT HEART CATH;  Surgeon: Yves Rodrigues MD;  Location:  HEART CARDIAC CATH LAB     CV RIGHT HEART CATH N/A 8/13/2019    Procedure: CV RIGHT HEART CATH;  Surgeon: Jackson Patten MD;  Location:  HEART CARDIAC CATH LAB     CV RIGHT HEART CATH N/A 10/15/2019    Procedure: CV RIGHT HEART CATH;  Surgeon: Santino Mckeon MD;  Location:  HEART CARDIAC CATH LAB     CV RIGHT HEART CATH N/A 2/12/2020    Procedure: CV RIGHT HEART CATH;  Surgeon: Isiah Mcallister MD;  Location:  HEART CARDIAC CATH LAB     INCISION AND DRAINAGE CHEST WASHOUT, COMBINED N/A 3/21/2019    Procedure: Incision And Drainage; Evacuation Right Chest Wall Hematoma;  Surgeon: Dewayne House MD;  Location: [facility]     INSERT INTRAAORTIC BALLOON PUMP Left 2/19/2019    Procedure: Insert left  Subclavian Balloon Pump,  Removal Right femoral arterial balloom pump sheath;  Surgeon: Dewayne House MD;  Location: [facility]     INSERT INTRAAORTIC BALLOON PUMP Left 2/21/2019    Procedure: SUBCLAVIAN BALLOON PUMP PLACEMENT;  Surgeon: Ben White MD;  Location:  OR      IR PICC PLACEMENT > 5 YRS OF AGE  2019     TRANSPLANT HEART RECIPIENT N/A 2019    Procedure: TRANSPLANT HEART RECIPIENT;  Surgeon: Dewayne House MD;  Location:  OR       Family History   Problem Relation Age of Onset     Endometrial Cancer Mother      Osteoporosis Mother      Hypertension Father      Endometrial Cancer Maternal Grandmother      Hip fracture No family hx of      Nephrolithiasis No family hx of        Social History     Tobacco Use     Smoking status: Former Smoker     Packs/day: 0.00     Years: 10.00     Pack years: 0.00     Start date: 1980     Quit date: 2008     Years since quittin.8     Smokeless tobacco: Never Used   Substance Use Topics     Alcohol use: Yes     Alcohol/week: 1.0 - 2.0 standard drinks     Types: 1 - 2 Cans of beer per week     Frequency: Monthly or less     Drinks per session: 1 or 2     Binge frequency: Less than monthly       Medications reviewed    ROS:   10 point ROS negative other than what is discussed above.    EXAM:   BP (!) 149/91   Pulse 105   Wt 76.7 kg (169 lb)   SpO2 99%   BMI 25.70 kg/m     GENERAL: Alert, oriented, NAD  HEENT:  NC/AT. Sclerae white.  MMM, no oral lesions  NECK: JVP 7-8 cm, 2+ carotids without bruits  HEART: RRR, normal S1 and S2, no murmurs. 2+ bilateral peripheral pulses.  LUNGS:  Clear to auscultation bilaterally, no wheezes, rales, or rhonchi  ABDOMEN: Soft, nontender, nondistended, bowel sounds present, no ascites.  EXTREMITIES: No lower extremity edema.    Labs:    CBC RESULTS:  Lab Results   Component Value Date    WBC 5.3 2020    RBC 4.86 2020    HGB 11.4 (L) 2020    HCT 36.3 (L) 2020    MCV 75 (L) 2020    MCH 23.5 (L) 2020    MCHC 31.4 (L) 2020    RDW 15.1 (H) 2020     2020       CMP RESULTS:  Lab Results   Component Value Date     2020    POTASSIUM 4.1 2020    CHLORIDE 108 2020    CO2 22 2020    ANIONGAP 9  09/16/2020     (H) 09/16/2020    BUN 35 (H) 09/16/2020    CR 1.92 (H) 09/16/2020    GFRESTIMATED 38 (L) 09/16/2020    GFRESTBLACK 44 (L) 09/16/2020    RADAMES 8.9 09/16/2020    BILITOTAL 0.4 09/16/2020    ALBUMIN 3.7 09/16/2020    ALKPHOS 167 (H) 09/16/2020    ALT 38 09/16/2020    AST 22 09/16/2020        INR RESULTS:  Lab Results   Component Value Date    INR 1.17 (H) 09/23/2019       Lab Results   Component Value Date    MAG 1.7 09/16/2020     Lab Results   Component Value Date    NTBNPI 767 09/23/2019     Lab Results   Component Value Date    NTBNP 10,986 (H) 11/15/2018       Echo today  Interpretation Summary  Global and regional left ventricular function is normal with an EF of 60-65%.  Global right ventricular function is normal.  Pulmonary artery systolic pressure is normal.  The inferior vena cava was normal in size with preserved respiratory variability.  No pericardial effusion is present.    RHC and angiography 2/12/2020  Pressures Phase   Time  Systolic  Diastolic  Mean  A Wave  V Wave  EDP  Max dp/dt  HR    RA Pressures  10:37 AM    7 mmHg     9 mmHg     7 mmHg       95 bpm       RV Pressures  10:37 AM  30 mmHg         9 mmHg      95 bpm       PA Pressures  10:38 AM  30 mmHg     16 mmHg     20 mmHg         94 bpm       PCW Pressures  10:38 AM    12 mmHg     13 mmHg     13 mmHg       95 bpm       AO Pressures  11:00 AM  94 mmHg     72 mmHg     83 mmHg         98 bpm       Art Pressures  10:59 AM  120 mmHg     74 mmHg     91 mmHg         103 bpm       LV Pressures  10:59 AM  120 mmHg         17 mmHg      102 bpm          Time  Hb  SAT(%)  PO2  Content  PA Sat    PA  10:21 AM   71.5 %       71.5 %       Art  10:21 AM   100 %      14.28 mL/dL        Cardiac Output    Time  TDCO  TDCI  Jaden C.O.  Jaden C.I.  Jaden HR    Cardiac Output Results  10:21 AM  6.77 L/min     3.74 L/min/m2     5.68 L/min     3.13 L/min/m2            Left Main    The vessel was visualized by selective angiography and is large. There  was 0% vessel disease.    Left Anterior Descending    The vessel was visualized by selective angiography and is large. There was 0% vessel disease. IVUS was performed on the LMCA and LAD vessels. The LMCA was engaged with a 6 Fr Ikari LF 3.5 GC and the patient was anticoagulated with IV UFH. A BMW was passed into the distal LAD without difficulty. The [place] [facility] IVUS was passed over the wire and manually pulled back while recording. Proximal LAD HERACLIO 0.3 Lumen 24.1 Vessel 29.8 19% area stenosis Mid LAD HERACLIO 0.3 Lumen 10.8 Vessel 13.7 21% area stenosis IVUS was performed on the vessel. Ultrasound supply: [facility] IVUS SHRT.    Left Circumflex    The vessel was visualized by selective angiography and is moderate in size. There was 0% vessel disease.    Right Coronary Artery    The vessel was visualized by selective angiography and is large. There was 0% vessel disease.    Conclusion    Mild intimal hyperplasia with up to 0.3 mm HERACLIO and 20% narrowing by area.    ECHO 12/18/2019  Interpretation Summary  Left ventricular function, chamber size, wall motion, and wall thickness are  normal.The EF is 60-65%.  Right ventricular function, chamber size, wall motion, and thickness are  normal.  No significant valvular abnormalities were noted.  Pulmonary artery systolic pressure is normal.  The inferior vena cava was normal in size with preserved respiratory  variability.  No pericardial effusion is present.      RHC and EMB 10/15/19:  Conclusion       Right sided filling pressures are normal.    Left sided filling pressures are normal.    Normal PA pressures.    Normal cardiac output level.    Successful collection of endomyocardial biopsies          Plan       Follow bedrest per protocol    Continued medical management and lifestyle modifications for cardiovascular risk factor optimizations.    Discharge today per protocol   Hemodynamics     Right Heart Pressures     Right sided filling pressures are normal.Left sided  filling pressures are normal. Normal PA pressures.Normal cardiac output level.   Heart Biospy     Heart Biopsy     After informed consent patient was prepped and draped in the usual fashion. Under fluoroscopy guidance a 7 Fr angeled sheath was placed into the right internal jugular vein after local anesthesia with 1.0% lidocaine. 5.5 [place] Jawz bioptome was passed through the sheath to the right ventricular septum and 5 biopsies were obtained. The biopsy specimens were sent to Pathology for examination. The sheath was removed and hemostasis was obtained. The patient tolerated the procedure well and there were no complications.   Pressures Phase: Baseline      Time Systolic Diastolic Mean A Wave V Wave EDP Max dp/dt HR   RA Pressures 11:49 AM   2 mmHg    3 mmHg    4 mmHg      105 bpm      RV Pressures 11:35 AM       1776 mmHg/sec        11:49 AM 22 mmHg        4 mmHg     105 bpm      PA Pressures 11:50 AM 20 mmHg    10 mmHg    14 mmHg        105 bpm      PCW Pressures 11:49 AM   6 mmHg    7 mmHg    7 mmHg      105 bpm      Blood Flow Results Phase: Baseline      Time Results Indexed Values   QP 11:35 AM 5.92 L/min    3.66 L/min/m2      QS 11:35 AM 5.92 L/min    3.66 L/min/m2      Blood Oximetry Phase: Baseline      Time Hb SAT(%) PO2 Content PA Sat   PA 11:35 AM  66.6 %      66.6 %      Art 11:35 AM  100 %     10.74 mL/dL       Cardiac Output Phase: Baseline      Time TDCO TDCI Jaden C.O. Jaden C.I. Jaden HR   Cardiac Output Results 11:35 AM 6.03 L/min    3.74 L/min/m2    5.92 L/min    3.66 L/min/m2        11:51 AM 6.03 L/min          Resistance Results Phase: Baseline      Time PVR SVR PVR-I SVR-I TPR TVR TPR-I TVR-I PVR/SVR TPR/TVR   Resistance Results (Metric) 11:35 .64 dsc-5     172.25 dsc-5/m2     186.62 dsc-5     301.45 dsc-5/m2         Resistance Results (Wood) 11:35 AM 1.33 MARTIN     2.15 MARTIN/m2     2.33 MARTIN     3.77 MARTIN/m2         Stoke Volume Results Phase: Baseline      Time RVSW LVSW RVSW-I LVSW-I   Stroke  Work Results 11:35 AM 9.33 gm*m     5.77 gm*m/m2             TTE 10/15/19:  Interpretation Summary  Left ventricular function, chamber size, wall motion, and wall thickness are  normal.The EF is 60-65%. No regional wall motion abnormalities are seen.  Right ventricular function, chamber size, wall motion, and thickness are  normal.  Trace tricuspid insufficiency is present. Pulmonary artery systolic pressure  is normal. The inferior vena cava was normal in size with preserved  respiratory variability. No pericardial effusion is present.  There has been no change.        Assessment and Plan:  55 year old male with a history of ASD (s/p repair in childhood), AFib/AF (s/p ablation), NICM, diastolic dysfunction, alcohol abuse, Pierce's esophagus, ulcerative colitis, GIB (s/p splenic embolization), and hypothyroidism, now s/p OHT and bypass of persistent left SVC to right atrial appendage 2/24/19 who presents for ongoing evaluation and management.    NICM, s/p OHT and bypass of persistent left SVC to right atrial appendage 2/24/19  His postoperative course was c/b shock due to RV dysfunction requiring IABP support, small pneumoperitoneum (clinically insignificant), chest wall hematoma (former ICD site, s/p evacuation and drain placement 3/31/19), HCAP/klebsiella pneumonia, and FRANKLIN (required short-term HD, now recovered).   His biopsy 3/25/19 showed mild AMR1 with some staining for c4d.  Repeat biopsy 3/28 showed improved AMR and less reactive capillary staining, and subsequent biopsies have now shown resolution of AMR and improved capillary staining.       Today patient is euvolemic by history and exam.  Overall doing well.  His echo confirmed normal biventricular function.     Serostatus:  - CMV D+/R+  - EBV D+/R+  - Toxo D-/R-     Immunosuppression:  - tacrolimus, given ongoing CRI, goal level decreased to 3-5.  Level today pending.  Adjust dose as indicated.    - sirolimus goal level 6-8.  Level today pending.   Adjust dose as indicated.         Graft function:  - BPs:  Elevated today.  Increase amlodipine to 10mg daily.  Instructed patient to call clinic if BP consistently > 140/90.  - HRs:  Stable, remain >80  - fluid status:  Euvolemic, off diuretics.        PPx:  - CAV:  Aspirin 81mg daily and rosuvastatin 10mg daily  - GERD:  Omeprazole (note h/o carrera's esophagus)  - Osteoporosis:  Calcium/vitamin D supplements     Routine screenings  Derm: Due  Dental: Due   Colonoscopy: Feb 2020   Prostate: 0.41  Eye: Due   Flu/Pneumonia: Flu shot given at clinic. Shringrix discussed. Pneumonia shots completed.      Ulcerative Colitis  - Followed by GI.        RTC: per protocol for annual evaluation in Feb 2021.  Will be happy to see sooner if change in clinical status or new questions/concerns arise.        Corinna Donis MD  Section Head - Advanced Heart Failure, Transplantation and Mechanical Circulatory Support  Director - Adult Congenital and Cardiovascular Genetics Center  Associate Professor of Medicine, [facility]      Please do not hesitate to contact me if you have any questions/concerns.     Sincerely,     Corinna Donis MD

## 2020-09-16 NOTE — PATIENT INSTRUCTIONS
Please call your coordinator at [phone] with any questions or concerns.      Coordinator will call with levels and remaining test results. Dr Donis decreased your tac goal to 3-5. Recheck in ~ 2 weeks.     Increase Amlodipine to 10 mg daily. Update coordinator with BP readings.    Be sure to drink enough water (~2 L daily)     Limit fried food to help with chol numbers     Return for annual in February 2021    Routine screenings  Derm: Please see derm before your annual in Feb   Dental: Make appt now that ou have benefits   Eye: Due    Please follow up with primary re Shingrix vaccine

## 2020-09-16 NOTE — NURSING NOTE
"Transplant Coordinator Note  Reason for visit: Return heart transplant     Health concerns addressed today  Pt seen in clinic with Dr Donis. MD review labs and ECHO. Pt reports he feels well \"like I am 38 years old again!\" MD noted Creat 1.92; enc to increase fluid intake. Tac goal decreased. Tac and Rapa levels pending. Reports SBPs ~140 plus. Amlodipine increased. Enc to cont to monitor and report readings. Reports pain in chest x2, ignored and went away.  MD added TSH level to today's labs.     Pt states he is doing better with his meds since setting alarms on his phone. He is back to work driving truck; most days gets in 12-15K steps. Tends to eat lunch at [employer]; enc to cut back on the fries as chol #  increased.      Preventive cares reviewed.     Immunosuppressants  Tacrolimus  - 3.5/3 mg  - goal decreased to 3-5, level pending   Sirolimus - 4 mg daily; goal 6-8; levels pending     Routine screenings  Derm: Due  Dental: Due   Colonoscopy: Feb 2020   Prostate: 0.41  Eye: Due   Flu/Pneumonia: Flu shot given at clinic. Shringrix discussed. Pneumonia shots completed.     Medication record reviewed and reconciled  Questions and concerns addressed. AVS reviewed and copy provided.    Pt verbalized an understanding of plan of care.     Patient Instructions  ~Please call your coordinator at [phone] with any questions or concerns.      ~Coordinator will call with levels and remaining test results. Dr Donis decreased your tac goal to 3-5. Recheck in ~ 2 weeks.     ~Increase Amlodipine to 10 mg daily. Update coordinator with BP readings.    ~Be sure to drink enough water (~2 L daily)     ~Limit fried food to help with chol numbers     ~Return for annual in February 2021    Routine screenings  Derm: Please see derm before your annual in Feb   Dental: Make appt now that ou have benefits   Eye: Due    Please follow up with primary re Shingrix vaccine         "

## 2020-09-17 DIAGNOSIS — Z94.1 TRANSPLANTED HEART (H): ICD-10-CM

## 2020-09-17 RX ORDER — SIROLIMUS 0.5 MG/1
TABLET, FILM COATED ORAL
Qty: 64 TABLET | Refills: 3 | Status: SHIPPED | OUTPATIENT
Start: 2020-09-17 | End: 2021-09-01

## 2020-09-18 ENCOUNTER — TELEPHONE (OUTPATIENT)
Dept: TRANSPLANT | Facility: CLINIC | Age: 55
End: 2020-09-18

## 2020-09-18 DIAGNOSIS — Z94.1 HEART REPLACED BY TRANSPLANT (H): ICD-10-CM

## 2020-09-18 LAB — IMMUKNOW IMMUNE CELL FUNCTION: 305 NG/ML

## 2020-09-18 RX ORDER — TACROLIMUS 0.5 MG/1
CAPSULE ORAL
Qty: 90 CAPSULE | Refills: 11 | Status: SHIPPED | OUTPATIENT
Start: 2020-09-18 | End: 2021-09-28

## 2020-09-18 NOTE — TELEPHONE ENCOUNTER
In care everywhere on 9/16 tac level 6.3. Goal 3-5. Current dose 3.5/3. Instructions given to decrease to 3 mg bid. Repeat in 1-2 weeks.       Rapa level 9.1. Goal 6-8. Current dose 4 mg daily. Cr 1.9. Was this a good 24 hour trough? If so, please decrease to 3 mg daily. Repeat a level in 1-2 weeks.       Pt called with above instructions. No answer. VM left. Instructions also sent over via [employer].

## 2020-09-21 LAB
ALLOMAP SCORE (EXTERNAL): 33
NEGATIVE PREDICTIVE VALUE PERCENT (EXTERNAL): 99.1 %
POSITIVE PREDICTIVE VALUE PERCENT (EXTERNAL): 3.8 %

## 2020-09-25 NOTE — TELEPHONE ENCOUNTER
Refill has not been completed by team and pt has followed up in clinic with cardiology provider and did not requested refill - request closed at this time.

## 2020-10-01 ENCOUNTER — COMMUNICATION - HEALTHEAST (OUTPATIENT)
Dept: INTERNAL MEDICINE | Facility: CLINIC | Age: 55
End: 2020-10-01

## 2020-10-01 DIAGNOSIS — F34.1 DYSTHYMIA: ICD-10-CM

## 2020-10-12 ENCOUNTER — AMBULATORY - HEALTHEAST (OUTPATIENT)
Dept: LAB | Facility: CLINIC | Age: 55
End: 2020-10-12

## 2020-10-12 ENCOUNTER — DOCUMENTATION ONLY (OUTPATIENT)
Dept: CARE COORDINATION | Facility: CLINIC | Age: 55
End: 2020-10-12

## 2020-10-12 DIAGNOSIS — Z94.1 HEART REPLACED BY TRANSPLANT (H): Primary | ICD-10-CM

## 2020-10-12 DIAGNOSIS — Z94.1 HEART TRANSPLANTED (H): ICD-10-CM

## 2020-10-12 NOTE — PROGRESS NOTES
Heart Transplant  notified by [employer] Patient Assistance Program that pt will need to complete another enrollment form to determine eligibility for assistance in 2021; enrollment form must be submitted by November 30, 2020. Pt previously completed this online.  Writer emailed pt that writer had received this communication and to reach out to writer or his transplant coordinator if he has questions.

## 2020-10-14 ENCOUNTER — AMBULATORY - HEALTHEAST (OUTPATIENT)
Dept: LAB | Facility: CLINIC | Age: 55
End: 2020-10-14

## 2020-10-14 DIAGNOSIS — Z94.1 HEART REPLACED BY TRANSPLANT (H): ICD-10-CM

## 2020-10-14 DIAGNOSIS — Z79.899 ENCOUNTER FOR LONG-TERM (CURRENT) USE OF MEDICATIONS: ICD-10-CM

## 2020-10-14 LAB
ANION GAP SERPL CALCULATED.3IONS-SCNC: 11 MMOL/L (ref 5–18)
BUN SERPL-MCNC: 34 MG/DL (ref 8–22)
CALCIUM SERPL-MCNC: 9.4 MG/DL (ref 8.5–10.5)
CHLORIDE BLD-SCNC: 106 MMOL/L (ref 98–107)
CO2 SERPL-SCNC: 24 MMOL/L (ref 22–31)
CREAT SERPL-MCNC: 1.65 MG/DL (ref 0.7–1.3)
GFR SERPL CREATININE-BSD FRML MDRD: 43 ML/MIN/1.73M2
GLUCOSE BLD-MCNC: 100 MG/DL (ref 70–125)
POTASSIUM BLD-SCNC: 4.4 MMOL/L (ref 3.5–5)
SODIUM SERPL-SCNC: 141 MMOL/L (ref 136–145)

## 2020-10-15 LAB
SIROLIMUS BLD-MCNC: 6.9 UG/L (ref 5–15)
TACROLIMUS BLD-MCNC: 6.2 UG/L (ref 5–15)
TACROLIMUS LAST DOSE: NORMAL
TME LAST DOSE: NORMAL H

## 2020-10-18 NOTE — PROGRESS NOTES
[facility]       HPI:  Everton Larios is a 55 year old male with a history of ASD (s/p repair in childhood), AFib/AF (s/p ablation), NICM, diastolic dysfunction, alcohol abuse, Pierce's esophagus, ulcerative colitis, GIB (s/p splenic embolization), and hypothyroidism, now s/p OHT and bypass of persistent left SVC to right atrial appendage 2/24/19 who presents to clinic for ongoing evaluation and management.    His postoperative course was c/b shock due to RV dysfunction requiring IABP support, small pneumoperitoneum (clinically insignificant), chest wall hematoma (former ICD site, s/p evacuation and drain placement 3/31/19), HCAP/klebsiella pneumonia, and FRANKLIN (required short-term HD, now recovered).  His biopsy 3/25/19 showed mild AMR1 with some staining for c4d. Repeat biopsy 3/28 showed improved AMR and less reactive capillary staining, and subsequent biopsies have now shown resolution of AMR and improved capillary staining.      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  Today patient reports that has been feeling well overall.  Notes he hasn't felt this good in many years.  He is back to working.  He denies any chest pain or pressure, sob/andrade, orthopnea, pnd, palpitations, syncope/presyncope or marielle.  He any denies fever/chills, sore throat, swollen lymph nodes, productive cough, abdominal pain, or N/V/D.  He also denies any problems with his medications and reports compliance.      Past Medical History:   Diagnosis Date     Alcohol abuse      Pierce's esophagus 10/4/2018     Chronic rhinitis 10/4/2018     Chronic systolic heart failure (H) 10/4/2018     Depression 10/4/2018     Diastolic dysfunction      DJD (degenerative joint disease) - neck 10/4/2018     H/O congenital atrial septal defect (ASD) repair at age 3 10/4/2018      Hypothyroidism due to medication 10/4/2018     ICD, [employer] 2008; gen change 2/2018 10/4/2018     Intermittent asthma without complication 10/4/2018     Nonischemic cardiomyopathy (H) 10/4/2018     On amiodarone therapy 10/4/2018     Paroxysmal atrial fibrillation (H) 10/4/2018     S/P ablation of atrial fibrillation 10/4/2018     Systolic heart failure (H)      Ulcerative colitis (H) 2005     Ventricular tachycardia (H) 10/4/2018       Past Surgical History:   Procedure Laterality Date     AICD, DUAL CHAMBER       BRONCHOSCOPY (RIGID OR FLEXIBLE), DIAGNOSTIC N/A 4/30/2019    Procedure: BRONCHOSCOPY, WITH BAL;  Surgeon: Margot Chiang MD;  Location:  GI     COLONOSCOPY N/A 2/20/2020    Procedure: COLONOSCOPY, WITH POLYPECTOMY AND BIOPSY;  Surgeon: Ranjeet Cooper MD;  Location:  GI     CV CORONARY ANGIOGRAM N/A 2/12/2020    Procedure: CV CORONARY ANGIOGRAM;  Surgeon: Isiah Mcallister MD;  Location:  HEART CARDIAC CATH LAB     CV HEART BIOPSY N/A 3/4/2019    Procedure: Heart Biopsy;  Surgeon: Abhijeet Titus MD;  Location:  HEART CARDIAC CATH LAB     CV HEART BIOPSY N/A 3/25/2019    Procedure: Heart Biopsy;  Surgeon: Yves Rodrigues MD;  Location:  HEART CARDIAC CATH LAB     CV HEART BIOPSY N/A 3/28/2019    Procedure: Heart Cath Heart Biopsy;  Surgeon: Yves Rodrigues MD;  Location:  HEART CARDIAC CATH LAB     CV HEART BIOPSY N/A 4/10/2019    Procedure: CV HEART BIOPSY;  Surgeon: Isiah Mcallister MD;  Location:  HEART CARDIAC CATH LAB     CV HEART BIOPSY N/A 4/24/2019    Procedure: CV HEART BIOPSY;  Surgeon: Isiah Mcallister MD;  Location:  HEART CARDIAC CATH LAB     CV HEART BIOPSY N/A 5/8/2019    Procedure: CV HEART BIOPSY;  Surgeon: Isiah Mcallister MD;  Location:  HEART CARDIAC CATH LAB     CV HEART BIOPSY N/A 6/4/2019    Procedure: CV HEART BIOPSY;  Surgeon: Alton Solomon MD;  Location:  HEART CARDIAC CATH LAB     CV HEART BIOPSY N/A  5/22/2019    Procedure: CV HEART BIOPSY;  Surgeon: Yves Rodrigues MD;  Location:  HEART CARDIAC CATH LAB     CV HEART BIOPSY N/A 7/17/2019    Procedure: CV HEART BIOPSY;  Surgeon: Isiah Mcallister MD;  Location:  HEART CARDIAC CATH LAB     CV HEART BIOPSY N/A 8/13/2019    Procedure: CV HEART BIOPSY;  Surgeon: Jackson Patten MD;  Location:  HEART CARDIAC CATH LAB     CV HEART BIOPSY N/A 10/15/2019    Procedure: CV HEART BIOPSY;  Surgeon: Santino Mckeon MD;  Location:  HEART CARDIAC CATH LAB     CV HEART BIOPSY N/A 2/12/2020    Procedure: CV HEART BIOPSY;  Surgeon: Isiah Mcallister MD;  Location:  HEART CARDIAC CATH LAB     CV HEART BIOPSY N/A 5/5/2020    Procedure: CV HEART BIOPSY;  Surgeon: Yves Rodrigues MD;  Location:  HEART CARDIAC CATH LAB     CV INTRA-AORTIC BALLOON PUMP INSERTION N/A 2/18/2019    Procedure: Intra-Aortic Balloon;  Surgeon: Alton Solomon MD;  Location:  HEART CARDIAC CATH LAB     CV INTRA-AORTIC BALLOON PUMP INSERTION N/A 2/20/2019    Procedure: Replace subclavian IABP;  Surgeon: Yves Rodrigues MD;  Location:  HEART CARDIAC CATH LAB     CV INTRAVASULAR ULTRASOUND N/A 2/12/2020    Procedure: CV INTRAVASCULAR ULTRASOUND;  Surgeon: Isiah Mcallister MD;  Location:  HEART CARDIAC CATH LAB     CV LEFT HEART CATH N/A 3/19/2019    Procedure: Left Heart Cath;  Surgeon: Yves Rodrigues MD;  Location:  HEART CARDIAC CATH LAB     CV LEFT HEART CATH N/A 2/12/2020    Procedure: Left Heart Cath;  Surgeon: Isiah Mcallister MD;  Location:  HEART CARDIAC CATH LAB     CV RIGHT HEART CATH N/A 3/19/2019    Procedure: RHC/HBx - Femoral access for 3/19 per Nimisha GONZALEZ;  Surgeon: Yves Rodrigues MD;  Location:  HEART CARDIAC CATH LAB     CV RIGHT HEART CATH N/A 3/25/2019    Procedure: Right Heart Cath;  Surgeon: Yves Rodrigues MD;  Location:  HEART CARDIAC CATH LAB     CV RIGHT HEART CATH N/A 3/28/2019    Procedure: Heart Cath  Right Heart Cath;  Surgeon: Yves Rodrigues MD;  Location:  HEART CARDIAC CATH LAB     CV RIGHT HEART CATH N/A 4/24/2019    Procedure: CV RIGHT HEART CATH;  Surgeon: Isiah Mcallister MD;  Location:  HEART CARDIAC CATH LAB     CV RIGHT HEART CATH N/A 3/11/2019    Procedure: ADD ON RHC/HBX;  Surgeon: Alton Solomon MD;  Location:  HEART CARDIAC CATH LAB     CV RIGHT HEART CATH N/A 6/4/2019    Procedure: CV RIGHT HEART CATH;  Surgeon: Alton Solomon MD;  Location:  HEART CARDIAC CATH LAB     CV RIGHT HEART CATH N/A 5/22/2019    Procedure: CV RIGHT HEART CATH;  Surgeon: Yves Rodrigues MD;  Location:  HEART CARDIAC CATH LAB     CV RIGHT HEART CATH N/A 8/13/2019    Procedure: CV RIGHT HEART CATH;  Surgeon: Jackson Patten MD;  Location:  HEART CARDIAC CATH LAB     CV RIGHT HEART CATH N/A 10/15/2019    Procedure: CV RIGHT HEART CATH;  Surgeon: Santino Mckeon MD;  Location:  HEART CARDIAC CATH LAB     CV RIGHT HEART CATH N/A 2/12/2020    Procedure: CV RIGHT HEART CATH;  Surgeon: Isiah Mcallister MD;  Location:  HEART CARDIAC CATH LAB     INCISION AND DRAINAGE CHEST WASHOUT, COMBINED N/A 3/21/2019    Procedure: Incision And Drainage; Evacuation Right Chest Wall Hematoma;  Surgeon: Dewayne House MD;  Location: [facility]     INSERT INTRAAORTIC BALLOON PUMP Left 2/19/2019    Procedure: Insert left  Subclavian Balloon Pump,  Removal Right femoral arterial balloom pump sheath;  Surgeon: Dewayne House MD;  Location: [facility]     INSERT INTRAAORTIC BALLOON PUMP Left 2/21/2019    Procedure: SUBCLAVIAN BALLOON PUMP PLACEMENT;  Surgeon: Ben White MD;  Location: [facility]     IR PICC PLACEMENT > 5 YRS OF AGE  5/8/2019     TRANSPLANT HEART RECIPIENT N/A 2/24/2019    Procedure: TRANSPLANT HEART RECIPIENT;  Surgeon: Dewayne House MD;  Location: [facility] OR       Family History   Problem Relation Age of Onset     Endometrial Cancer Mother      Osteoporosis  Mother      Hypertension Father      Endometrial Cancer Maternal Grandmother      Hip fracture No family hx of      Nephrolithiasis No family hx of        Social History     Tobacco Use     Smoking status: Former Smoker     Packs/day: 0.00     Years: 10.00     Pack years: 0.00     Start date: 1980     Quit date: 2008     Years since quittin.8     Smokeless tobacco: Never Used   Substance Use Topics     Alcohol use: Yes     Alcohol/week: 1.0 - 2.0 standard drinks     Types: 1 - 2 Cans of beer per week     Frequency: Monthly or less     Drinks per session: 1 or 2     Binge frequency: Less than monthly       Medications reviewed    ROS:   10 point ROS negative other than what is discussed above.    EXAM:   BP (!) 149/91   Pulse 105   Wt 76.7 kg (169 lb)   SpO2 99%   BMI 25.70 kg/m     GENERAL: Alert, oriented, NAD  HEENT:  NC/AT. Sclerae white.  MMM, no oral lesions  NECK: JVP 7-8 cm, 2+ carotids without bruits  HEART: RRR, normal S1 and S2, no murmurs. 2+ bilateral peripheral pulses.  LUNGS:  Clear to auscultation bilaterally, no wheezes, rales, or rhonchi  ABDOMEN: Soft, nontender, nondistended, bowel sounds present, no ascites.  EXTREMITIES: No lower extremity edema.    Labs:    CBC RESULTS:  Lab Results   Component Value Date    WBC 5.3 2020    RBC 4.86 2020    HGB 11.4 (L) 2020    HCT 36.3 (L) 2020    MCV 75 (L) 2020    MCH 23.5 (L) 2020    MCHC 31.4 (L) 2020    RDW 15.1 (H) 2020     2020       CMP RESULTS:  Lab Results   Component Value Date     2020    POTASSIUM 4.1 2020    CHLORIDE 108 2020    CO2 22 2020    ANIONGAP 9 2020     (H) 2020    BUN 35 (H) 2020    CR 1.92 (H) 2020    GFRESTIMATED 38 (L) 2020    GFRESTBLACK 44 (L) 2020    RADAMES 8.9 2020    BILITOTAL 0.4 2020    ALBUMIN 3.7 2020    ALKPHOS 167 (H) 2020    ALT 38 2020    AST 22  09/16/2020        INR RESULTS:  Lab Results   Component Value Date    INR 1.17 (H) 09/23/2019       Lab Results   Component Value Date    MAG 1.7 09/16/2020     Lab Results   Component Value Date    NTBNPI 767 09/23/2019     Lab Results   Component Value Date    NTBNP 10,986 (H) 11/15/2018       Echo today  Interpretation Summary  Global and regional left ventricular function is normal with an EF of 60-65%.  Global right ventricular function is normal.  Pulmonary artery systolic pressure is normal.  The inferior vena cava was normal in size with preserved respiratory variability.  No pericardial effusion is present.    RHC and angiography 2/12/2020  Pressures Phase   Time  Systolic  Diastolic  Mean  A Wave  V Wave  EDP  Max dp/dt  HR    RA Pressures  10:37 AM    7 mmHg     9 mmHg     7 mmHg       95 bpm       RV Pressures  10:37 AM  30 mmHg         9 mmHg      95 bpm       PA Pressures  10:38 AM  30 mmHg     16 mmHg     20 mmHg         94 bpm       PCW Pressures  10:38 AM    12 mmHg     13 mmHg     13 mmHg       95 bpm       AO Pressures  11:00 AM  94 mmHg     72 mmHg     83 mmHg         98 bpm       Art Pressures  10:59 AM  120 mmHg     74 mmHg     91 mmHg         103 bpm       LV Pressures  10:59 AM  120 mmHg         17 mmHg      102 bpm          Time  Hb  SAT(%)  PO2  Content  PA Sat    PA  10:21 AM   71.5 %       71.5 %       Art  10:21 AM   100 %      14.28 mL/dL        Cardiac Output    Time  TDCO  TDCI  Jaden C.O.  Jaden C.I.  Jaden HR    Cardiac Output Results  10:21 AM  6.77 L/min     3.74 L/min/m2     5.68 L/min     3.13 L/min/m2            Left Main    The vessel was visualized by selective angiography and is large. There was 0% vessel disease.    Left Anterior Descending    The vessel was visualized by selective angiography and is large. There was 0% vessel disease. IVUS was performed on the LMCA and LAD vessels. The LMCA was engaged with a 6 Fr Ikari LF 3.5 GC and the patient was anticoagulated with IV  [facility]. A BMW was passed into the distal LAD without difficulty. The [facility] IVUS was passed over the wire and manually pulled back while recording. Proximal LAD HERACLIO 0.3 Lumen 24.1 Vessel 29.8 19% area stenosis Mid LAD HERACLIO 0.3 Lumen 10.8 Vessel 13.7 21% area stenosis IVUS was performed on the vessel. Ultrasound supply: [facility] IVUS SHRT.    Left Circumflex    The vessel was visualized by selective angiography and is moderate in size. There was 0% vessel disease.    Right Coronary Artery    The vessel was visualized by selective angiography and is large. There was 0% vessel disease.    Conclusion    Mild intimal hyperplasia with up to 0.3 mm HERACLIO and 20% narrowing by area.    ECHO 12/18/2019  Interpretation Summary  Left ventricular function, chamber size, wall motion, and wall thickness are  normal.The EF is 60-65%.  Right ventricular function, chamber size, wall motion, and thickness are  normal.  No significant valvular abnormalities were noted.  Pulmonary artery systolic pressure is normal.  The inferior vena cava was normal in size with preserved respiratory  variability.  No pericardial effusion is present.      RHC and EMB 10/15/19:  Conclusion       Right sided filling pressures are normal.    Left sided filling pressures are normal.    Normal PA pressures.    Normal cardiac output level.    Successful collection of endomyocardial biopsies          Plan       Follow bedrest per protocol    Continued medical management and lifestyle modifications for cardiovascular risk factor optimizations.    Discharge today per protocol   Hemodynamics     Right Heart Pressures     Right sided filling pressures are normal.Left sided filling pressures are normal. Normal PA pressures.Normal cardiac output level.   Heart Biospy     Heart Biopsy     After informed consent patient was prepped and draped in the usual fashion. Under fluoroscopy guidance a 7 Fr angeled sheath was placed into the right internal jugular vein  after local anesthesia with 1.0% lidocaine. 5.5 Urdu Jawz bioptome was passed through the sheath to the right ventricular septum and 5 biopsies were obtained. The biopsy specimens were sent to Pathology for examination. The sheath was removed and hemostasis was obtained. The patient tolerated the procedure well and there were no complications.   Pressures Phase: Baseline      Time Systolic Diastolic Mean A Wave V Wave EDP Max dp/dt HR   RA Pressures 11:49 AM   2 mmHg    3 mmHg    4 mmHg      105 bpm      RV Pressures 11:35 AM       1776 mmHg/sec        11:49 AM 22 mmHg        4 mmHg     105 bpm      PA Pressures 11:50 AM 20 mmHg    10 mmHg    14 mmHg        105 bpm      PCW Pressures 11:49 AM   6 mmHg    7 mmHg    7 mmHg      105 bpm      Blood Flow Results Phase: Baseline      Time Results Indexed Values   QP 11:35 AM 5.92 L/min    3.66 L/min/m2      QS 11:35 AM 5.92 L/min    3.66 L/min/m2      Blood Oximetry Phase: Baseline      Time Hb SAT(%) PO2 Content PA Sat   PA 11:35 AM  66.6 %      66.6 %      Art 11:35 AM  100 %     10.74 mL/dL       Cardiac Output Phase: Baseline      Time TDCO TDCI Jaden C.O. Jaden C.I. Jaden HR   Cardiac Output Results 11:35 AM 6.03 L/min    3.74 L/min/m2    5.92 L/min    3.66 L/min/m2        11:51 AM 6.03 L/min          Resistance Results Phase: Baseline      Time PVR SVR PVR-I SVR-I TPR TVR TPR-I TVR-I PVR/SVR TPR/TVR   Resistance Results (Metric) 11:35 .64 dsc-5     172.25 dsc-5/m2     186.62 dsc-5     301.45 dsc-5/m2         Resistance Results (Wood) 11:35 AM 1.33 MARTIN     2.15 MARTIN/m2     2.33 MARTIN     3.77 MARTIN/m2         Stoke Volume Results Phase: Baseline      Time RVSW LVSW RVSW-I LVSW-I   Stroke Work Results 11:35 AM 9.33 gm*m     5.77 gm*m/m2             TTE 10/15/19:  Interpretation Summary  Left ventricular function, chamber size, wall motion, and wall thickness are  normal.The EF is 60-65%. No regional wall motion abnormalities are seen.  Right ventricular function, chamber  size, wall motion, and thickness are  normal.  Trace tricuspid insufficiency is present. Pulmonary artery systolic pressure  is normal. The inferior vena cava was normal in size with preserved  respiratory variability. No pericardial effusion is present.  There has been no change.        Assessment and Plan:  55 year old male with a history of ASD (s/p repair in childhood), AFib/AF (s/p ablation), NICM, diastolic dysfunction, alcohol abuse, Pierce's esophagus, ulcerative colitis, GIB (s/p splenic embolization), and hypothyroidism, now s/p OHT and bypass of persistent left SVC to right atrial appendage 2/24/19 who presents for ongoing evaluation and management.    NICM, s/p OHT and bypass of persistent left SVC to right atrial appendage 2/24/19  His postoperative course was c/b shock due to RV dysfunction requiring IABP support, small pneumoperitoneum (clinically insignificant), chest wall hematoma (former ICD site, s/p evacuation and drain placement 3/31/19), HCAP/klebsiella pneumonia, and FRANKLIN (required short-term HD, now recovered).   His biopsy 3/25/19 showed mild AMR1 with some staining for c4d.  Repeat biopsy 3/28 showed improved AMR and less reactive capillary staining, and subsequent biopsies have now shown resolution of AMR and improved capillary staining.       Today patient is euvolemic by history and exam.  Overall doing well.  His echo confirmed normal biventricular function.     Serostatus:  - CMV D+/R+  - EBV D+/R+  - Toxo D-/R-     Immunosuppression:  - tacrolimus, given ongoing CRI, goal level decreased to 3-5.  Level today pending.  Adjust dose as indicated.    - sirolimus goal level 6-8.  Level today pending.  Adjust dose as indicated.         Graft function:  - BPs:  Elevated today.  Increase amlodipine to 10mg daily.  Instructed patient to call clinic if BP consistently > 140/90.  - HRs:  Stable, remain >80  - fluid status:  Euvolemic, off diuretics.        PPx:  - CAV:  Aspirin 81mg daily and  rosuvastatin 10mg daily  - GERD:  Omeprazole (note h/o carrera's esophagus)  - Osteoporosis:  Calcium/vitamin D supplements     Routine screenings  Derm: Due  Dental: Due   Colonoscopy: Feb 2020   Prostate: 0.41  Eye: Due   Flu/Pneumonia: Flu shot given at clinic. Shringrix discussed. Pneumonia shots completed.      Ulcerative Colitis  - Followed by GI.        RTC: per protocol for annual evaluation in Feb 2021.  Will be happy to see sooner if change in clinical status or new questions/concerns arise.        Corinna Donis MD  Section Head - Advanced Heart Failure, Transplantation and Mechanical Circulatory Support  Director - Adult [facility]enital [facility] Cardiovascular [facility]  Associate Professor of Medicine, [facility]

## 2020-10-20 ENCOUNTER — TELEPHONE (OUTPATIENT)
Dept: TRANSPLANT | Facility: CLINIC | Age: 55
End: 2020-10-20

## 2020-10-20 DIAGNOSIS — Z94.1 TRANSPLANTED HEART (H): Primary | ICD-10-CM

## 2020-10-20 NOTE — TELEPHONE ENCOUNTER
Pt called with lab results.  Creat 1.62.  Sirolimus level 6.9.  Goal 6-8.  No dose change.  Tacrolimus level 6.2.  Goal 3-5.  Pt to decrease dose to 3mg in the AM and 2mg in the PM.  Recheck in 1-2 weeks.  Pt states understanding plan or care and instructions.

## 2020-10-30 ENCOUNTER — TELEPHONE (OUTPATIENT)
Dept: CARDIOLOGY | Facility: CLINIC | Age: 55
End: 2020-10-30

## 2020-10-30 NOTE — TELEPHONE ENCOUNTER
BP Readings from Last 3 Encounters:   09/16/20 (!) 149/91   05/05/20 (!) 145/81   02/20/20 102/75       Panel Management Review      Patient has the following on his problem list:   Hypertension   Last three blood pressure readings:  BP Readings from Last 3 Encounters:   09/16/20 (!) 149/91   05/05/20 (!) 145/81   02/20/20 102/75     Blood pressure: FAILED    HTN Guidelines:  Less than 140/90      Composite cancer screening  Chart review shows that this patient is due/due soon for the following None  Summary:    Patient is due/failing the following:   BP CHECK    Action needed:   Routed to provider for review.    Type of outreach:    Phone, spoke to patient.  10/30/20 at 2:30 pm    Questions for provider review:    None                                                                                                                                    Spoke to Everton about his elevated BP last time he was in clinic. He says that he has not been monitoring this BP since the last time he was in clinic. He andrade have a BP at home. He states that he has been feeling good. Everton said he plans to go be his blood work for 's Lab around next Saturday. He says the hospital has a drop in lab on the weekend and he will get his BP check there when he goes to the hospital. I also let Everton know that there is a [facility] in [place] close by him that he can drop into to get his BP without an appt needed.      Anthony Lara

## 2020-11-07 ENCOUNTER — HEALTH MAINTENANCE LETTER (OUTPATIENT)
Age: 55
End: 2020-11-07

## 2020-11-19 DIAGNOSIS — E03.2 HYPOTHYROIDISM DUE TO MEDICATION: Chronic | ICD-10-CM

## 2020-11-19 DIAGNOSIS — M80.00XD AGE-RELATED OSTEOPOROSIS WITH CURRENT PATHOLOGICAL FRACTURE WITH ROUTINE HEALING, SUBSEQUENT ENCOUNTER: ICD-10-CM

## 2020-11-19 DIAGNOSIS — Z92.241 HISTORY OF CORTICOSTEROID THERAPY: ICD-10-CM

## 2020-11-19 DIAGNOSIS — R79.89 LOW TSH LEVEL: ICD-10-CM

## 2020-11-23 NOTE — TELEPHONE ENCOUNTER
alendronate (FOSAMAX) 70 MG tablet  Last Written Prescription Date:  11/17/2019  Last Fill Quantity: 12,   # refills: 3  Last Office Visit : 10/22/2019  Future Office visit:  None  Routing refill request to provider for review/approval because:  Per Refill Protocol, Refer all refills to Provider for review.        Sheila Austin RN  Central Triage Red Flags/Med Refills

## 2020-11-24 RX ORDER — ALENDRONATE SODIUM 70 MG/1
70 TABLET ORAL
Qty: 12 TABLET | Refills: 4 | Status: SHIPPED | OUTPATIENT
Start: 2020-11-24 | End: 2022-07-01

## 2020-11-28 ENCOUNTER — AMBULATORY - HEALTHEAST (OUTPATIENT)
Dept: LAB | Facility: HOSPITAL | Age: 55
End: 2020-11-28

## 2020-11-28 DIAGNOSIS — Z94.1 HEART REPLACED BY TRANSPLANT (H): ICD-10-CM

## 2020-11-28 LAB
ANION GAP SERPL CALCULATED.3IONS-SCNC: 10 MMOL/L (ref 5–18)
BUN SERPL-MCNC: 23 MG/DL (ref 8–22)
CALCIUM SERPL-MCNC: 9 MG/DL (ref 8.5–10.5)
CHLORIDE BLD-SCNC: 106 MMOL/L (ref 98–107)
CO2 SERPL-SCNC: 24 MMOL/L (ref 22–31)
CREAT SERPL-MCNC: 1.56 MG/DL (ref 0.7–1.3)
GFR SERPL CREATININE-BSD FRML MDRD: 46 ML/MIN/1.73M2
GLUCOSE BLD-MCNC: 123 MG/DL (ref 70–125)
POTASSIUM BLD-SCNC: 4 MMOL/L (ref 3.5–5)
SIROLIMUS BLD-MCNC: 7.5 UG/L (ref 5–15)
SODIUM SERPL-SCNC: 140 MMOL/L (ref 136–145)
TACROLIMUS BLD-MCNC: 5.1 UG/L (ref 5–15)
TACROLIMUS LAST DOSE: NORMAL
TME LAST DOSE: NORMAL H

## 2020-11-30 DIAGNOSIS — M80.00XD AGE-RELATED OSTEOPOROSIS WITH CURRENT PATHOLOGICAL FRACTURE WITH ROUTINE HEALING, SUBSEQUENT ENCOUNTER: ICD-10-CM

## 2020-11-30 DIAGNOSIS — R79.89 LOW TSH LEVEL: ICD-10-CM

## 2020-11-30 DIAGNOSIS — E03.2 HYPOTHYROIDISM DUE TO MEDICATION: Chronic | ICD-10-CM

## 2020-11-30 DIAGNOSIS — Z92.241 HISTORY OF CORTICOSTEROID THERAPY: ICD-10-CM

## 2020-12-02 ENCOUNTER — TELEPHONE (OUTPATIENT)
Dept: TRANSPLANT | Facility: CLINIC | Age: 55
End: 2020-12-02

## 2020-12-02 DIAGNOSIS — Z94.1 HEART REPLACED BY TRANSPLANT (H): ICD-10-CM

## 2020-12-02 RX ORDER — TACROLIMUS 1 MG/1
3 CAPSULE ORAL 2 TIMES DAILY
Qty: 180 CAPSULE | Refills: 11 | Status: CANCELLED | OUTPATIENT
Start: 2020-12-02

## 2020-12-02 RX ORDER — TACROLIMUS 1 MG/1
CAPSULE ORAL
Qty: 150 CAPSULE | Refills: 11 | Status: ON HOLD | OUTPATIENT
Start: 2020-12-02 | End: 2021-03-17

## 2020-12-02 NOTE — TELEPHONE ENCOUNTER
levothyroxine (SYNTHROID/LEVOTHROID) 100 MCG tablet    Last Written Prescription Date:  11/17/2019  Last Fill Quantity: 90,   # refills: 3  Last Office Visit : 10/22/2019  Future Office visit:  None  Routing refill request to provider for review/approval because:  Office Visit due  Abnormal TSH Labs  Per Protocol refer all refills to Provider for review    Recent Labs   Lab Test 09/16/20  1348   TSH <0.01*         Sheila Austin RN  Central Triage Red Flags/Med Refills

## 2020-12-02 NOTE — TELEPHONE ENCOUNTER
Pt called to remind that he needs to recheck his IMS drug levels.  Pt states he had them drawn this weekend at [facility].  Sirolimus level in Care Everywhere 7.5.  Goal 6-8.  No dose change.  Tacrolimus level in Care Everywhere 5.1.  Goal 3-5.  No dose change.  Creat 1.6, other BMP values wnl.  Plan to recheck levels at 2nd annual appointment.  Pt states understanding all instructions and plan of care.

## 2020-12-30 ENCOUNTER — TELEPHONE (OUTPATIENT)
Dept: ENDOCRINOLOGY | Facility: CLINIC | Age: 55
End: 2020-12-30

## 2020-12-30 RX ORDER — LEVOTHYROXINE SODIUM 100 UG/1
TABLET ORAL
Qty: 90 TABLET | OUTPATIENT
Start: 2020-12-30

## 2020-12-30 NOTE — TELEPHONE ENCOUNTER
"Pt states he has appointment with PCP on Monday to manage his thyroid issues. Confirms understanding that one provider should be managing thyroid and states: \"It's gonna be my primary\".  Jayda Bradley RN on 12/30/2020 at 10:07 AM       What dose of levothyroxine are you currently taking?      Who is managing the thyroid?     What were the circumstances of the 9/2020 labs?       RE    Agata Weathers MD  to Ric Evertoniram Mayo          12/2/20 3:33 PM  Today I had a request to refill your  Levothyroxine       Patients who are on thyroid medication require follow up in order to ensure proper dosing and patient safety.       The doctor who is following the thyroid condition should be the one filling the prescription.     Review of the record shows that you had abnormal thyroid labs 9/2020.  You last saw me 10/2019 and we reduced your dose then.  I do not know the circumstances of the 9/2020 labs which I did not order.  What dose of levothyroxine are you currently taking?  Who is managing the thyroid? What were the circumstances of the 9/2020 labs?       If you believe it is me managing the thyroid please schedule follow up appt ([phone]).      Agata Weathers MD    "

## 2020-12-30 NOTE — TELEPHONE ENCOUNTER
Please call him and read him the 12/2/2020 [email or web address] note I sent him which I can see he has already read.  It had questions he has not answered.  Is someone else managing the thyroid or am I ?  If someone else, have him specify so it is on the record.    IF I am I need the answers to the questions.  Thanks  Agata Weathers MD

## 2021-01-04 ENCOUNTER — TELEPHONE (OUTPATIENT)
Dept: TRANSPLANT | Facility: CLINIC | Age: 56
End: 2021-01-04

## 2021-01-04 ENCOUNTER — OFFICE VISIT - HEALTHEAST (OUTPATIENT)
Dept: INTERNAL MEDICINE | Facility: CLINIC | Age: 56
End: 2021-01-04

## 2021-01-04 DIAGNOSIS — N18.2 CHRONIC RENAL INSUFFICIENCY, STAGE 2 (MILD): ICD-10-CM

## 2021-01-04 DIAGNOSIS — R20.0 NUMBNESS AND TINGLING IN RIGHT HAND: ICD-10-CM

## 2021-01-04 DIAGNOSIS — Z94.1 TRANSPLANTED HEART (H): ICD-10-CM

## 2021-01-04 DIAGNOSIS — K22.70 BARRETT'S ESOPHAGUS WITHOUT DYSPLASIA: ICD-10-CM

## 2021-01-04 DIAGNOSIS — Z94.1 HEART REPLACED BY TRANSPLANT (H): ICD-10-CM

## 2021-01-04 DIAGNOSIS — I10 ESSENTIAL HYPERTENSION, BENIGN: ICD-10-CM

## 2021-01-04 DIAGNOSIS — M81.0 OSTEOPOROSIS, UNSPECIFIED OSTEOPOROSIS TYPE, UNSPECIFIED PATHOLOGICAL FRACTURE PRESENCE: ICD-10-CM

## 2021-01-04 DIAGNOSIS — Z51.81 ENCOUNTER FOR THERAPEUTIC DRUG MONITORING: ICD-10-CM

## 2021-01-04 DIAGNOSIS — E03.9 HYPOTHYROIDISM, UNSPECIFIED TYPE: ICD-10-CM

## 2021-01-04 DIAGNOSIS — K51.90 ULCERATIVE COLITIS WITHOUT COMPLICATIONS, UNSPECIFIED LOCATION (H): ICD-10-CM

## 2021-01-04 DIAGNOSIS — R20.2 NUMBNESS AND TINGLING IN RIGHT HAND: ICD-10-CM

## 2021-01-04 LAB
ALBUMIN SERPL-MCNC: 3.8 G/DL (ref 3.5–5)
ALP SERPL-CCNC: 146 U/L (ref 45–120)
ALT SERPL W P-5'-P-CCNC: 28 U/L (ref 0–45)
ANION GAP SERPL CALCULATED.3IONS-SCNC: 11 MMOL/L (ref 5–18)
AST SERPL W P-5'-P-CCNC: 21 U/L (ref 0–40)
BILIRUB SERPL-MCNC: 0.4 MG/DL (ref 0–1)
BUN SERPL-MCNC: 29 MG/DL (ref 8–22)
CALCIUM SERPL-MCNC: 8.9 MG/DL (ref 8.5–10.5)
CHLORIDE BLD-SCNC: 106 MMOL/L (ref 98–107)
CO2 SERPL-SCNC: 22 MMOL/L (ref 22–31)
CREAT SERPL-MCNC: 1.6 MG/DL (ref 0.7–1.3)
GFR SERPL CREATININE-BSD FRML MDRD: 45 ML/MIN/1.73M2
GLUCOSE BLD-MCNC: 97 MG/DL (ref 70–125)
POTASSIUM BLD-SCNC: 3.6 MMOL/L (ref 3.5–5)
PROT SERPL-MCNC: 7.5 G/DL (ref 6–8)
SODIUM SERPL-SCNC: 139 MMOL/L (ref 136–145)
T4 FREE SERPL-MCNC: 1 NG/DL (ref 0.7–1.8)
TSH SERPL DL<=0.005 MIU/L-ACNC: 0.02 UIU/ML (ref 0.3–5)

## 2021-01-04 NOTE — TELEPHONE ENCOUNTER
Prior Authorization Specialty Medication Request    Medication/Dose: tacrolimus 1mg &0.5mg, sirolimus0.5mg  ICD code (if different than what is on RX):  z94.1  Previously Tried and Failed:  See chart    Important Lab Values: see chart  Rationale: Heart Transplant    Insurance Name: see chart  Insurance ID: see chart  Insurance Phone Number: see chart    Pharmacy Information (if different than what is on RX)  Name:  [facility] [address]  Phone:  [phone]

## 2021-01-04 NOTE — TELEPHONE ENCOUNTER
Patient Call: General  Route to LPN    Reason for call: Pt needs a PA for the new year with his insurance  Tacro and Sirolimus     Call back needed? Yes    Return Call Needed  Same as documented in contacts section  When to return call?: Greater than one day: Route standard priority

## 2021-01-05 ENCOUNTER — COMMUNICATION - HEALTHEAST (OUTPATIENT)
Dept: INTERNAL MEDICINE | Facility: CLINIC | Age: 56
End: 2021-01-05

## 2021-01-05 NOTE — TELEPHONE ENCOUNTER
Prior Authorization Not Needed per Insurance    Medication: tacrolimus 1mg &0.5mg, sirolimus0.5mg  Insurance Company:  [employer]  Expected CoPay:      Pharmacy Filling the Rx:  Per the insurance, patient must use [facility] [phone]  Pharmacy Notified:  Spoke to [facility] technician, the patient brought in a new pharmacy insurance card. It doesn't require a prior auth but stated they must use [facility].  Patient Notified:  Left message for patient to advise of this and verify their insurance info. Does patient still have [employer] Part B and Part D?     Routing to coordinator, [facility] advised provider to send new scripts over to [facility].

## 2021-01-06 NOTE — TELEPHONE ENCOUNTER
Patient confirmed they do not have [employer] anymore. Advised them that [facility] is the mandated pharmacy for the insurance with these meds. Sent a mychart with [facility] pharmacy phone#.  Confirmed with insurance Tacrolimus & Sirolimus are covered without needing a prior auth at tier 1 generics.

## 2021-01-14 DIAGNOSIS — M80.00XD AGE-RELATED OSTEOPOROSIS WITH CURRENT PATHOLOGICAL FRACTURE WITH ROUTINE HEALING, SUBSEQUENT ENCOUNTER: ICD-10-CM

## 2021-01-14 DIAGNOSIS — R79.89 LOW TSH LEVEL: ICD-10-CM

## 2021-01-14 DIAGNOSIS — E03.2 HYPOTHYROIDISM DUE TO MEDICATION: Chronic | ICD-10-CM

## 2021-01-14 DIAGNOSIS — Z92.241 HISTORY OF CORTICOSTEROID THERAPY: ICD-10-CM

## 2021-01-15 RX ORDER — LEVOTHYROXINE SODIUM 100 UG/1
TABLET ORAL
Qty: 90 TABLET | Refills: 3 | OUTPATIENT
Start: 2021-01-15

## 2021-01-15 NOTE — TELEPHONE ENCOUNTER
"Denied refill: Per Endocrine>12/30/20  patient will follow with PCP for thyroid.    Per 12/30/20 note  Endocrine( excerpted)    Jayda Bradley RN         12/30/20 10:09 AM  Note     Pt states he has appointment with PCP on Monday to manage his thyroid issues. Confirms understanding that one provider should be managing thyroid and states: \"It's gonna be my primary\".  Jayda Bradley RN on 12/30/2020 at 10:07 AM         PCP listed as : Fredrick Wolff MD    [address] [place] [address]    [phone] (Fax    "

## 2021-01-22 ENCOUNTER — TELEPHONE (OUTPATIENT)
Dept: TRANSPLANT | Facility: CLINIC | Age: 56
End: 2021-01-22

## 2021-01-22 DIAGNOSIS — Z94.1 TRANSPLANTED HEART (H): Primary | ICD-10-CM

## 2021-01-22 NOTE — TELEPHONE ENCOUNTER
January 22, 2021 11:05 AM -  AIVERSE1: called pt to [facility] 2 nd annual 3/17 confrimed yareli smith

## 2021-01-22 NOTE — TELEPHONE ENCOUNTER
Call returned to patient. Pt inquiring about scheduling 2nd annual due in Feb. Orders placed and message sent to scheduling. Pt states otherwise he is doing well with no complaints. Covid vaccine discussed. No further questions at this time.

## 2021-02-09 ENCOUNTER — TELEPHONE (OUTPATIENT)
Dept: TRANSPLANT | Facility: CLINIC | Age: 56
End: 2021-02-09

## 2021-02-09 NOTE — TELEPHONE ENCOUNTER
Instructions reviewed for upcoming appointments. Pt needs to have a ride. Pt states brother is going to help with transportation. Pt understands to be npo for 6 hours.

## 2021-02-17 ENCOUNTER — TELEPHONE (OUTPATIENT)
Dept: TRANSPLANT | Facility: CLINIC | Age: 56
End: 2021-02-17

## 2021-02-17 DIAGNOSIS — M80.00XD AGE-RELATED OSTEOPOROSIS WITH CURRENT PATHOLOGICAL FRACTURE WITH ROUTINE HEALING, SUBSEQUENT ENCOUNTER: ICD-10-CM

## 2021-02-17 DIAGNOSIS — R79.89 LOW TSH LEVEL: ICD-10-CM

## 2021-02-17 DIAGNOSIS — Z92.241 HISTORY OF CORTICOSTEROID THERAPY: ICD-10-CM

## 2021-02-17 DIAGNOSIS — E03.2 HYPOTHYROIDISM DUE TO MEDICATION: Chronic | ICD-10-CM

## 2021-02-17 RX ORDER — LEVOTHYROXINE SODIUM 100 UG/1
TABLET ORAL
Qty: 90 TABLET | Refills: 3 | OUTPATIENT
Start: 2021-02-17

## 2021-02-17 NOTE — TELEPHONE ENCOUNTER
Pt no longer under provider- Dr. Rodriguez  PCP to refill: see RF note 1-14-21    PCP listed as : Fredrick Wolff MD    [address]    [place], [place] [id number] [phone] (Fax

## 2021-02-17 NOTE — TELEPHONE ENCOUNTER
I spoke with a pharmacist at [facility] and they did a verbal ok for Everton's rapamune and tacrolimus.. They will contact the patient.

## 2021-02-18 ENCOUNTER — TELEPHONE (OUTPATIENT)
Dept: TRANSPLANT | Facility: CLINIC | Age: 56
End: 2021-02-18

## 2021-02-19 ENCOUNTER — COMMUNICATION - HEALTHEAST (OUTPATIENT)
Dept: FAMILY MEDICINE | Facility: CLINIC | Age: 56
End: 2021-02-19

## 2021-02-22 ENCOUNTER — OFFICE VISIT - HEALTHEAST (OUTPATIENT)
Dept: FAMILY MEDICINE | Facility: CLINIC | Age: 56
End: 2021-02-22

## 2021-02-22 DIAGNOSIS — Z94.1 HEART REPLACED BY TRANSPLANT (H): ICD-10-CM

## 2021-02-22 DIAGNOSIS — Z02.4 ENCOUNTER FOR CDL (COMMERCIAL DRIVING LICENSE) EXAM: ICD-10-CM

## 2021-02-22 LAB
ALBUMIN UR-MCNC: ABNORMAL MG/DL
APPEARANCE UR: CLEAR
BACTERIA #/AREA URNS HPF: ABNORMAL HPF
BILIRUB UR QL STRIP: NEGATIVE
COLOR UR AUTO: YELLOW
GLUCOSE UR STRIP-MCNC: NEGATIVE MG/DL
HGB UR QL STRIP: NEGATIVE
KETONES UR STRIP-MCNC: NEGATIVE MG/DL
LEUKOCYTE ESTERASE UR QL STRIP: NEGATIVE
NITRATE UR QL: NEGATIVE
PH UR STRIP: 6.5 [PH] (ref 5–8)
RBC #/AREA URNS AUTO: ABNORMAL HPF
SP GR UR STRIP: 1.02 (ref 1–1.03)
SQUAMOUS #/AREA URNS AUTO: ABNORMAL LPF
UROBILINOGEN UR STRIP-ACNC: ABNORMAL
WBC #/AREA URNS AUTO: ABNORMAL HPF

## 2021-02-22 ASSESSMENT — MIFFLIN-ST. JEOR: SCORE: 1567.31

## 2021-02-22 ASSESSMENT — PATIENT HEALTH QUESTIONNAIRE - PHQ9: SUM OF ALL RESPONSES TO PHQ QUESTIONS 1-9: 0

## 2021-02-24 NOTE — PROGRESS NOTES
CVTS PROGRESS NOTE  March 5, 2019      CO-MORBIDITIES:   Chronic systolic heart failure (H)  (primary encounter diagnosis)  Acute on chronic systolic congestive heart failure (H)    ASSESSMENT: Everton Larios is a 53 year old male with PMH etoh abuse, hypothyroidism, intermittent asthma, ulcerative colitis, Depression, Chronic HFrEF, NICM admitted with cardiogenic shock requiring subclavian balloon pump now s/p OHT 2/24/19 with [facility] and [facility].     TODAY'S PROGRESS:   - Fluid challenge today  - Decrease dobutamine to 2.5  - Remove RIJ sheath  - Repeat echo  - Continue to hold diuresis    PLAN:  Neuro/ pain/ sedation:  - Monitor neurological status. Notify the MD for any acute changes in exam.  - Tylenol, oxycodone and PRN fentanyl     #Depression  PTA cymbalta 20mg  Melatonin for sleep     Pulmonary care:   - 2l NC  - Supp oxygen to maintain SpO2 >92%  - Encourage IS and deep breathing    #Intermittent asthma  - PTA Montelukast ordered    Cardiovascular:  HR goal >95. Persistent mild hypotension, High CI, suspect this could be mix of hypovolemia and low SVR from sildenafil and dobutamine, trial of decreasing dobutamine 3/5/2019 with fluid challenge   - Monitor hemodynamic status.   - MAP >65. HR goal > 95.  - Dobutamine 2.5 mcg/kg/min for RV support  - Increased sildenafil 20 q8h 3/3/2019   - Hydralazine discontinued for borderline hypotension  - continue terbutaline 10 mg q8h  - Holding ASA for bleeding around lines.   - Continue statin.     GI care:   #constipation  - Regular diet  - Senna, miralax and suppository for bowel regimen; MoM and miralax prn     #Intraperitoneal free air (CT obtained 2/26)  - Small peritoneal defect made during redo operation, closed with stitches intra-op- likely source of free air. No peritoneal signs, pain improved.     #Pierce's esophagus  #Ulcerative colitis  -PTA Mesalamine     Fluids/ Electrolytes/ Nutrition:   - TKO for IV fluid hydration  - No indication for  parenteral nutrition.    Renal/ Fluid Balance:   Slow increase in creatinine, likely secondary to significant diuresis on Lasix and Bumex drips recently, holding diuresis at this time, will continue to trend, adequate urine output at present.  - Will continue to monitor intake and output.  - Hold diuresis today in attempt to increase filling pressures  - PTA Tamsulosin      Endocrine:    # Stress hyperglycemia  - sliding scale insulin as needed  # hypothyroidism  - Home levothyroxine      ID/ Antibiotics:  - Perioperative antibiotics.   - Vancomycin and zosyn x 3 days- completed.  - Valcyte for CMV prophylaxis; holding bactrim for PCP prophylaxis.  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 Will require lifelong anti-coagulation for graft  - Hgb goal >7. Daily CBC.  - Low intensity heparin on hold for right heart cath    Prophylaxis:    - Mechanical prophylaxis for DVT  - Heparin as above  - Protonix qd     Lines/ tubes/ drains:  - RIJ, Banco, Arterial line, med/pleural chest tubes     Disposition:  - CV ICU.     Patient seen, findings and plan discussed with CVTS Fellow    Nicolas Paredes MD  CA-3/PGY-4 Anesthesiology  [phone]  ====================================    SUBJECTIVE:   FRANTZ, RHC yesterday, CVP 10, new LIJ swan. Feels well overall, tired this morning    OBJECTIVE:   1. VITAL SIGNS:   Temp:  [97.5  F (36.4  C)-98.1  F (36.7  C)] 97.5  F (36.4  C)  Pulse:  [89-96] 89  Heart Rate:  [89-96] 89  Resp:  [16-25] 24  BP: ()/(51-67) 109/60  SpO2:  [96 %-100 %] 100 %  Resp: 24      2. INTAKE/ OUTPUT:   I/O last 3 completed shifts:  In: 745.03 [P.O.:395; I.V.:350.03]  Out: 915 [Urine:565; Chest Tube:350]    3. PHYSICAL EXAMINATION:     General: very pleasant, appropriately interactive   Neuro: Alert and oriented by 3, speech is fluent, face is symmetric  Resp: CTAB on room air  CV: Regular rate, regular rhythm, serosanguinous output  from chest tubes, trace pedal edema  Abdomen: Soft, Non-distended, non-tender  Incisions: c/d/i  Extremities: warm and well perfused    4. INVESTIGATIONS:   Arterial Blood Gases   No lab results found in last 7 days.  Complete Blood Count   Recent Labs   Lab 03/05/19  0346 03/04/19  1603 03/04/19  0412 03/03/19  0329   WBC 19.7* 17.7* 18.9* 14.3*   HGB 8.2* 8.4* 8.5* 8.6*    220 214 201     Basic Metabolic Panel  Recent Labs   Lab 03/05/19  0346 03/04/19  1603 03/04/19  0412 03/03/19  1548   * 129* 130* 130*   POTASSIUM 4.2 4.1 4.2 4.2   CHLORIDE 94 96 96 97   CO2 19* 18* 18* 21   BUN 91* 84* 79* 73*   CR 2.83* 2.31* 2.63* 2.34*   * 119* 108* 111*     Liver Function Tests  Recent Labs   Lab 03/05/19  0346 03/02/19  0930   AST 16 26   ALT 23 36   ALKPHOS 102 87   BILITOTAL 1.0 0.9   ALBUMIN 2.9* 2.8*     Pancreatic Enzymes  No lab results found in last 7 days.  Coagulation Profile  Recent Labs   Lab 02/27/19  0421   PTT 38*         5. RADIOLOGY:   Recent Results (from the past 24 hour(s))   XR Chest Port 1 View    Narrative    EXAM: XR CHEST PORT 1 VW  3/5/2019 1:31 AM      HISTORY: Lines and tubes.    COMPARISON: Radiograph 3/4/2018    FINDINGS: AP radiograph of the chest. Left IJ [place] catheter tip  projects over the distal interlobar artery on the right. Right IJ  [place] catheter sheath tip projects over the high SVC. Stable  mediastinal drains and chest tubes.    3 to 4 mm metallic foreign body projecting over the superior aspect of  the right hilum. Has been present since postoperative film of  2/25/2019 and likely represents part of the patient's a pacemaker that  was removed.    No pneumothorax. Stable left basilar opacity. No definite pleural  effusions. Mild diffuse interstitial markings.      Impression    IMPRESSION:   1. Left IJ [place] catheter tip projects over the distal right  interlobar artery. Recommend retraction.  2. Stable left basilar opacity, likely atelectasis.  3.  Mild pulmonary edema.  4. Incidental note is made of retained portion of the the patient's  pacemaker lead after heart transplant.  .    Findings of [place]-Liz catheter placement were discussed with Dr. Jaeger  by telephone at 3/5/2019 1:35 AM by Dr. Sanjay Gutiérrez.    I have personally reviewed the examination and initial interpretation  and I agree with the findings.    ZAC MIRZA MD       =========================================         Yes

## 2021-02-26 ENCOUNTER — COMMUNICATION - HEALTHEAST (OUTPATIENT)
Dept: ADMINISTRATIVE | Facility: CLINIC | Age: 56
End: 2021-02-26

## 2021-02-26 DIAGNOSIS — E03.9 HYPOTHYROIDISM, UNSPECIFIED TYPE: ICD-10-CM

## 2021-02-26 NOTE — TELEPHONE ENCOUNTER
Pt called in stating [employer] no longer will help assist in medication costs because patient makes too much money now. Writer explained salary cap to patient. Numbers offered to call to see if there is any additional programs for assistance. Pt has $5000 deductible he has to meet prior to getting assistance. Pt verbalized understanding of above.

## 2021-03-01 DIAGNOSIS — Z11.59 ENCOUNTER FOR SCREENING FOR OTHER VIRAL DISEASES: ICD-10-CM

## 2021-03-09 ENCOUNTER — AMBULATORY - HEALTHEAST (OUTPATIENT)
Dept: FAMILY MEDICINE | Facility: CLINIC | Age: 56
End: 2021-03-09

## 2021-03-09 DIAGNOSIS — Z11.59 ENCOUNTER FOR SCREENING FOR OTHER VIRAL DISEASES: ICD-10-CM

## 2021-03-13 ENCOUNTER — AMBULATORY - HEALTHEAST (OUTPATIENT)
Dept: FAMILY MEDICINE | Facility: CLINIC | Age: 56
End: 2021-03-13

## 2021-03-13 DIAGNOSIS — Z11.59 ENCOUNTER FOR SCREENING FOR OTHER VIRAL DISEASES: ICD-10-CM

## 2021-03-14 LAB
SARS-COV-2 PCR COMMENT: NORMAL
SARS-COV-2 RNA SPEC QL NAA+PROBE: NEGATIVE
SARS-COV-2 VIRUS SPECIMEN SOURCE: NORMAL

## 2021-03-15 ENCOUNTER — COMMUNICATION - HEALTHEAST (OUTPATIENT)
Dept: SCHEDULING | Facility: CLINIC | Age: 56
End: 2021-03-15

## 2021-03-16 ENCOUNTER — PRE VISIT (OUTPATIENT)
Dept: TRANSPLANT | Facility: CLINIC | Age: 56
End: 2021-03-16

## 2021-03-16 ENCOUNTER — TELEPHONE (OUTPATIENT)
Dept: CARDIOLOGY | Facility: CLINIC | Age: 56
End: 2021-03-16

## 2021-03-16 DIAGNOSIS — Z13.29 THYROID DISORDER SCREENING: ICD-10-CM

## 2021-03-16 DIAGNOSIS — Z94.1 TRANSPLANTED HEART (H): Primary | ICD-10-CM

## 2021-03-16 DIAGNOSIS — Z12.5 PROSTATE CANCER SCREENING: ICD-10-CM

## 2021-03-16 DIAGNOSIS — Z13.220 LIPID SCREENING: ICD-10-CM

## 2021-03-16 RX ORDER — SODIUM CHLORIDE 9 MG/ML
INJECTION, SOLUTION INTRAVENOUS CONTINUOUS
Status: CANCELLED | OUTPATIENT
Start: 2021-03-16

## 2021-03-16 RX ORDER — LIDOCAINE 40 MG/G
CREAM TOPICAL
Status: CANCELLED | OUTPATIENT
Start: 2021-03-16

## 2021-03-16 NOTE — TELEPHONE ENCOUNTER
Left voicemail for patient to complete Travel Screen for Cardiac Cath Lab appointment on 3/17 and inform patient of updated Visitor Policy.       covid neg

## 2021-03-17 ENCOUNTER — HOSPITAL ENCOUNTER (OUTPATIENT)
Dept: GENERAL RADIOLOGY | Facility: CLINIC | Age: 56
End: 2021-03-17
Attending: INTERNAL MEDICINE
Payer: COMMERCIAL

## 2021-03-17 ENCOUNTER — HOSPITAL ENCOUNTER (OUTPATIENT)
Facility: CLINIC | Age: 56
Discharge: HOME | End: 2021-03-17
Attending: INTERNAL MEDICINE | Admitting: INTERNAL MEDICINE
Payer: COMMERCIAL

## 2021-03-17 ENCOUNTER — APPOINTMENT (OUTPATIENT)
Dept: MEDSURG UNIT | Facility: CLINIC | Age: 56
End: 2021-03-17
Attending: INTERNAL MEDICINE
Payer: COMMERCIAL

## 2021-03-17 ENCOUNTER — HOSPITAL ENCOUNTER (OUTPATIENT)
Dept: MRI IMAGING | Facility: CLINIC | Age: 56
End: 2021-03-17
Attending: INTERNAL MEDICINE
Payer: COMMERCIAL

## 2021-03-17 ENCOUNTER — RESULTS ONLY (OUTPATIENT)
Dept: OTHER | Facility: CLINIC | Age: 56
End: 2021-03-17

## 2021-03-17 ENCOUNTER — APPOINTMENT (OUTPATIENT)
Dept: LAB | Facility: CLINIC | Age: 56
End: 2021-03-17
Attending: INTERNAL MEDICINE
Payer: COMMERCIAL

## 2021-03-17 VITALS
SYSTOLIC BLOOD PRESSURE: 148 MMHG | RESPIRATION RATE: 18 BRPM | DIASTOLIC BLOOD PRESSURE: 86 MMHG | OXYGEN SATURATION: 98 % | HEIGHT: 68 IN | WEIGHT: 160 LBS | TEMPERATURE: 97.8 F | BODY MASS INDEX: 24.25 KG/M2 | HEART RATE: 98 BPM

## 2021-03-17 VITALS — SYSTOLIC BLOOD PRESSURE: 133 MMHG | HEART RATE: 103 BPM | RESPIRATION RATE: 16 BRPM | DIASTOLIC BLOOD PRESSURE: 97 MMHG

## 2021-03-17 DIAGNOSIS — Z12.5 PROSTATE CANCER SCREENING: ICD-10-CM

## 2021-03-17 DIAGNOSIS — Z94.1 TRANSPLANTED HEART (H): ICD-10-CM

## 2021-03-17 DIAGNOSIS — Z13.29 THYROID DISORDER SCREENING: ICD-10-CM

## 2021-03-17 DIAGNOSIS — Z13.220 LIPID SCREENING: ICD-10-CM

## 2021-03-17 LAB
ALBUMIN SERPL-MCNC: 3.6 G/DL (ref 3.4–5)
ALBUMIN UR-MCNC: 70 MG/DL
ALP SERPL-CCNC: 131 U/L (ref 40–150)
ALT SERPL W P-5'-P-CCNC: 49 U/L (ref 0–70)
ANION GAP SERPL CALCULATED.3IONS-SCNC: 8 MMOL/L (ref 3–14)
APPEARANCE UR: CLEAR
AST SERPL W P-5'-P-CCNC: 48 U/L (ref 0–45)
BASOPHILS # BLD AUTO: 0 10E9/L (ref 0–0.2)
BASOPHILS NFR BLD AUTO: 0.5 %
BILIRUB SERPL-MCNC: 0.5 MG/DL (ref 0.2–1.3)
BILIRUB UR QL STRIP: NEGATIVE
BUN SERPL-MCNC: 32 MG/DL (ref 7–30)
CALCIUM SERPL-MCNC: 9 MG/DL (ref 8.5–10.1)
CHLORIDE SERPL-SCNC: 106 MMOL/L (ref 94–109)
CHOLEST SERPL-MCNC: 255 MG/DL
CK SERPL-CCNC: 1052 U/L (ref 30–300)
CK SERPL-CCNC: 824 U/L (ref 30–300)
CMV DNA SPEC NAA+PROBE-ACNC: NORMAL [IU]/ML
CMV DNA SPEC NAA+PROBE-LOG#: NORMAL {LOG_IU}/ML
CO2 SERPL-SCNC: 24 MMOL/L (ref 20–32)
COLOR UR AUTO: ABNORMAL
CREAT SERPL-MCNC: 1.8 MG/DL (ref 0.66–1.25)
DIFFERENTIAL METHOD BLD: ABNORMAL
EOSINOPHIL # BLD AUTO: 0.1 10E9/L (ref 0–0.7)
EOSINOPHIL NFR BLD AUTO: 2.3 %
ERYTHROCYTE [DISTWIDTH] IN BLOOD BY AUTOMATED COUNT: 15.2 % (ref 10–15)
GFR SERPL CREATININE-BSD FRML MDRD: 41 ML/MIN/{1.73_M2}
GLUCOSE SERPL-MCNC: 85 MG/DL (ref 70–99)
GLUCOSE UR STRIP-MCNC: NEGATIVE MG/DL
HCT VFR BLD AUTO: 37.8 % (ref 40–53)
HDLC SERPL-MCNC: 89 MG/DL
HGB BLD-MCNC: 11.9 G/DL (ref 13.3–17.7)
HGB BLD-MCNC: 12.5 G/DL (ref 13.3–17.7)
HGB UR QL STRIP: NEGATIVE
IMM GRANULOCYTES # BLD: 0 10E9/L (ref 0–0.4)
IMM GRANULOCYTES NFR BLD: 0.2 %
KETONES UR STRIP-MCNC: ABNORMAL MG/DL
LDLC SERPL CALC-MCNC: 140 MG/DL
LEUKOCYTE ESTERASE UR QL STRIP: NEGATIVE
LYMPHOCYTES # BLD AUTO: 2.2 10E9/L (ref 0.8–5.3)
LYMPHOCYTES NFR BLD AUTO: 35.4 %
MAGNESIUM SERPL-MCNC: 1.6 MG/DL (ref 1.6–2.3)
MCH RBC QN AUTO: 24.6 PG (ref 26.5–33)
MCHC RBC AUTO-ENTMCNC: 33.1 G/DL (ref 31.5–36.5)
MCV RBC AUTO: 74 FL (ref 78–100)
MONOCYTES # BLD AUTO: 0.7 10E9/L (ref 0–1.3)
MONOCYTES NFR BLD AUTO: 10.9 %
MUCOUS THREADS #/AREA URNS LPF: PRESENT /LPF
NEUTROPHILS # BLD AUTO: 3.1 10E9/L (ref 1.6–8.3)
NEUTROPHILS NFR BLD AUTO: 50.7 %
NITRATE UR QL: NEGATIVE
NONHDLC SERPL-MCNC: 166 MG/DL
NRBC # BLD AUTO: 0 10*3/UL
NRBC BLD AUTO-RTO: 0 /100
OXYHGB MFR BLDV: 70 %
PH UR STRIP: 7 PH (ref 5–7)
PHOSPHATE SERPL-MCNC: 2.6 MG/DL (ref 2.5–4.5)
PLATELET # BLD AUTO: 225 10E9/L (ref 150–450)
POTASSIUM SERPL-SCNC: 3.9 MMOL/L (ref 3.4–5.3)
PROT SERPL-MCNC: 7.4 G/DL (ref 6.8–8.8)
PSA SERPL-ACNC: 0.72 UG/L (ref 0–4)
RBC # BLD AUTO: 5.08 10E12/L (ref 4.4–5.9)
RBC #/AREA URNS AUTO: <1 /HPF (ref 0–2)
SIROLIMUS BLD-MCNC: 7 UG/L (ref 5–15)
SODIUM SERPL-SCNC: 137 MMOL/L (ref 133–144)
SOURCE: ABNORMAL
SP GR UR STRIP: 1.02 (ref 1–1.03)
SPECIMEN SOURCE: NORMAL
SQUAMOUS #/AREA URNS AUTO: 0 /HPF (ref 0–1)
T4 FREE SERPL-MCNC: 1.34 NG/DL (ref 0.76–1.46)
TACROLIMUS BLD-MCNC: 7.3 UG/L (ref 5–15)
TME LAST DOSE: NORMAL H
TME LAST DOSE: NORMAL H
TRIGL SERPL-MCNC: 128 MG/DL
TSH SERPL DL<=0.005 MIU/L-ACNC: 0.04 MU/L (ref 0.4–4)
UROBILINOGEN UR STRIP-MCNC: NORMAL MG/DL (ref 0–2)
WBC # BLD AUTO: 6.2 10E9/L (ref 4–11)
WBC #/AREA URNS AUTO: <1 /HPF (ref 0–5)

## 2021-03-17 PROCEDURE — 250N000011 HC RX IP 250 OP 636: Performed by: INTERNAL MEDICINE

## 2021-03-17 PROCEDURE — 99207 PR NO BILLABLE SERVICE THIS VISIT: CPT | Mod: 25 | Performed by: PHYSICIAN ASSISTANT

## 2021-03-17 PROCEDURE — 84100 ASSAY OF PHOSPHORUS: CPT | Performed by: INTERNAL MEDICINE

## 2021-03-17 PROCEDURE — 99153 MOD SED SAME PHYS/QHP EA: CPT | Performed by: INTERNAL MEDICINE

## 2021-03-17 PROCEDURE — 93017 CV STRESS TEST TRACING ONLY: CPT

## 2021-03-17 PROCEDURE — 36415 COLL VENOUS BLD VENIPUNCTURE: CPT | Performed by: INTERNAL MEDICINE

## 2021-03-17 PROCEDURE — 999N000142 HC STATISTIC PROCEDURE PREP ONLY

## 2021-03-17 PROCEDURE — 99152 MOD SED SAME PHYS/QHP 5/>YRS: CPT | Performed by: INTERNAL MEDICINE

## 2021-03-17 PROCEDURE — 71046 X-RAY EXAM CHEST 2 VIEWS: CPT

## 2021-03-17 PROCEDURE — 93505 ENDOMYOCARDIAL BIOPSY: CPT | Mod: XU | Performed by: INTERNAL MEDICINE

## 2021-03-17 PROCEDURE — 250N000009 HC RX 250: Performed by: INTERNAL MEDICINE

## 2021-03-17 PROCEDURE — 87799 DETECT AGENT NOS DNA QUANT: CPT | Performed by: INTERNAL MEDICINE

## 2021-03-17 PROCEDURE — 93456 R HRT CORONARY ARTERY ANGIO: CPT | Performed by: INTERNAL MEDICINE

## 2021-03-17 PROCEDURE — 86352 CELL FUNCTION ASSAY W/STIM: CPT | Performed by: INTERNAL MEDICINE

## 2021-03-17 PROCEDURE — 88346 IMFLUOR 1ST 1ANTB STAIN PX: CPT | Mod: TC | Performed by: INTERNAL MEDICINE

## 2021-03-17 PROCEDURE — 75563 CARD MRI W/STRESS IMG & DYE: CPT

## 2021-03-17 PROCEDURE — 84439 ASSAY OF FREE THYROXINE: CPT | Performed by: INTERNAL MEDICINE

## 2021-03-17 PROCEDURE — 999N001095 HC STATISTIC STAT SERVICE TX TESTING: Performed by: INTERNAL MEDICINE

## 2021-03-17 PROCEDURE — 272N000001 HC OR GENERAL SUPPLY STERILE: Performed by: INTERNAL MEDICINE

## 2021-03-17 PROCEDURE — 84443 ASSAY THYROID STIM HORMONE: CPT | Performed by: INTERNAL MEDICINE

## 2021-03-17 PROCEDURE — 83735 ASSAY OF MAGNESIUM: CPT | Performed by: INTERNAL MEDICINE

## 2021-03-17 PROCEDURE — 250N000013 HC RX MED GY IP 250 OP 250 PS 637: Performed by: PHYSICIAN ASSISTANT

## 2021-03-17 PROCEDURE — 86832 HLA CLASS I HIGH DEFIN QUAL: CPT | Performed by: INTERNAL MEDICINE

## 2021-03-17 PROCEDURE — 80197 ASSAY OF TACROLIMUS: CPT | Performed by: INTERNAL MEDICINE

## 2021-03-17 PROCEDURE — 93018 CV STRESS TEST I&R ONLY: CPT | Performed by: INTERNAL MEDICINE

## 2021-03-17 PROCEDURE — 71046 X-RAY EXAM CHEST 2 VIEWS: CPT | Mod: 26 | Performed by: RADIOLOGY

## 2021-03-17 PROCEDURE — 88350 IMFLUOR EA ADDL 1ANTB STN PX: CPT | Mod: TC | Performed by: INTERNAL MEDICINE

## 2021-03-17 PROCEDURE — 80195 ASSAY OF SIROLIMUS: CPT | Performed by: INTERNAL MEDICINE

## 2021-03-17 PROCEDURE — 85018 HEMOGLOBIN: CPT

## 2021-03-17 PROCEDURE — 75563 CARD MRI W/STRESS IMG & DYE: CPT | Mod: 26 | Performed by: INTERNAL MEDICINE

## 2021-03-17 PROCEDURE — C1894 INTRO/SHEATH, NON-LASER: HCPCS | Performed by: INTERNAL MEDICINE

## 2021-03-17 PROCEDURE — 88307 TISSUE EXAM BY PATHOLOGIST: CPT | Mod: TC | Performed by: INTERNAL MEDICINE

## 2021-03-17 PROCEDURE — 82550 ASSAY OF CK (CPK): CPT | Performed by: PHYSICIAN ASSISTANT

## 2021-03-17 PROCEDURE — 93010 ELECTROCARDIOGRAM REPORT: CPT | Mod: 76 | Performed by: INTERNAL MEDICINE

## 2021-03-17 PROCEDURE — A9585 GADOBUTROL INJECTION: HCPCS | Performed by: INTERNAL MEDICINE

## 2021-03-17 PROCEDURE — 93016 CV STRESS TEST SUPVJ ONLY: CPT | Performed by: INTERNAL MEDICINE

## 2021-03-17 PROCEDURE — 93005 ELECTROCARDIOGRAM TRACING: CPT

## 2021-03-17 PROCEDURE — 80053 COMPREHEN METABOLIC PANEL: CPT | Performed by: INTERNAL MEDICINE

## 2021-03-17 PROCEDURE — 80061 LIPID PANEL: CPT | Performed by: INTERNAL MEDICINE

## 2021-03-17 PROCEDURE — 86833 HLA CLASS II HIGH DEFIN QUAL: CPT | Performed by: INTERNAL MEDICINE

## 2021-03-17 PROCEDURE — 82810 BLOOD GASES O2 SAT ONLY: CPT

## 2021-03-17 PROCEDURE — 88350 IMFLUOR EA ADDL 1ANTB STN PX: CPT | Mod: 26 | Performed by: PATHOLOGY

## 2021-03-17 PROCEDURE — 255N000002 HC RX 255 OP 636: Performed by: INTERNAL MEDICINE

## 2021-03-17 PROCEDURE — 85025 COMPLETE CBC W/AUTO DIFF WBC: CPT | Performed by: INTERNAL MEDICINE

## 2021-03-17 PROCEDURE — G0103 PSA SCREENING: HCPCS | Performed by: INTERNAL MEDICINE

## 2021-03-17 PROCEDURE — 82550 ASSAY OF CK (CPK): CPT | Performed by: INTERNAL MEDICINE

## 2021-03-17 PROCEDURE — 999N000054 HC STATISTIC EKG NON-CHARGEABLE

## 2021-03-17 PROCEDURE — 88346 IMFLUOR 1ST 1ANTB STAIN PX: CPT | Mod: 26 | Performed by: PATHOLOGY

## 2021-03-17 PROCEDURE — 88307 TISSUE EXAM BY PATHOLOGIST: CPT | Mod: 26 | Performed by: PATHOLOGY

## 2021-03-17 PROCEDURE — 81001 URINALYSIS AUTO W/SCOPE: CPT | Performed by: INTERNAL MEDICINE

## 2021-03-17 RX ORDER — NALOXONE HYDROCHLORIDE 0.4 MG/ML
0.2 INJECTION, SOLUTION INTRAMUSCULAR; INTRAVENOUS; SUBCUTANEOUS
Status: DISCONTINUED | OUTPATIENT
Start: 2021-03-17 | End: 2021-03-17 | Stop reason: HOSPADM

## 2021-03-17 RX ORDER — IODIXANOL 320 MG/ML
INJECTION, SOLUTION INTRAVASCULAR
Status: DISCONTINUED | OUTPATIENT
Start: 2021-03-17 | End: 2021-03-17 | Stop reason: HOSPADM

## 2021-03-17 RX ORDER — LIDOCAINE 40 MG/G
CREAM TOPICAL
Status: DISCONTINUED | OUTPATIENT
Start: 2021-03-17 | End: 2021-03-17 | Stop reason: HOSPADM

## 2021-03-17 RX ORDER — FENTANYL CITRATE 50 UG/ML
25-50 INJECTION, SOLUTION INTRAMUSCULAR; INTRAVENOUS
Status: ACTIVE | OUTPATIENT
Start: 2021-03-17 | End: 2021-03-17

## 2021-03-17 RX ORDER — DIPHENHYDRAMINE HCL 25 MG
25 CAPSULE ORAL
Status: DISCONTINUED | OUTPATIENT
Start: 2021-03-17 | End: 2021-03-18 | Stop reason: HOSPADM

## 2021-03-17 RX ORDER — NALOXONE HYDROCHLORIDE 0.4 MG/ML
0.4 INJECTION, SOLUTION INTRAMUSCULAR; INTRAVENOUS; SUBCUTANEOUS
Status: DISCONTINUED | OUTPATIENT
Start: 2021-03-17 | End: 2021-03-17 | Stop reason: HOSPADM

## 2021-03-17 RX ORDER — METHYLPREDNISOLONE SODIUM SUCCINATE 125 MG/2ML
125 INJECTION, POWDER, LYOPHILIZED, FOR SOLUTION INTRAMUSCULAR; INTRAVENOUS
Status: DISCONTINUED | OUTPATIENT
Start: 2021-03-17 | End: 2021-03-18 | Stop reason: HOSPADM

## 2021-03-17 RX ORDER — FLUMAZENIL 0.1 MG/ML
0.2 INJECTION, SOLUTION INTRAVENOUS
Status: DISCONTINUED | OUTPATIENT
Start: 2021-03-17 | End: 2021-03-17 | Stop reason: HOSPADM

## 2021-03-17 RX ORDER — SODIUM CHLORIDE 9 MG/ML
INJECTION, SOLUTION INTRAVENOUS CONTINUOUS
Status: DISCONTINUED | OUTPATIENT
Start: 2021-03-17 | End: 2021-03-17 | Stop reason: HOSPADM

## 2021-03-17 RX ORDER — AMINOPHYLLINE 25 MG/ML
100 INJECTION, SOLUTION INTRAVENOUS ONCE
Status: COMPLETED | OUTPATIENT
Start: 2021-03-17 | End: 2021-03-17

## 2021-03-17 RX ORDER — ONDANSETRON 2 MG/ML
4 INJECTION INTRAMUSCULAR; INTRAVENOUS
Status: DISCONTINUED | OUTPATIENT
Start: 2021-03-17 | End: 2021-03-18 | Stop reason: HOSPADM

## 2021-03-17 RX ORDER — ACYCLOVIR 200 MG/1
0-1 CAPSULE ORAL
Status: DISCONTINUED | OUTPATIENT
Start: 2021-03-17 | End: 2021-03-18 | Stop reason: HOSPADM

## 2021-03-17 RX ORDER — ALBUTEROL SULFATE 90 UG/1
2 AEROSOL, METERED RESPIRATORY (INHALATION) EVERY 5 MIN PRN
Status: DISCONTINUED | OUTPATIENT
Start: 2021-03-17 | End: 2021-03-18 | Stop reason: HOSPADM

## 2021-03-17 RX ORDER — ATROPINE SULFATE 0.1 MG/ML
0.5 INJECTION INTRAVENOUS
Status: DISCONTINUED | OUTPATIENT
Start: 2021-03-17 | End: 2021-03-17 | Stop reason: HOSPADM

## 2021-03-17 RX ORDER — FENTANYL CITRATE 50 UG/ML
INJECTION, SOLUTION INTRAMUSCULAR; INTRAVENOUS
Status: DISCONTINUED | OUTPATIENT
Start: 2021-03-17 | End: 2021-03-17 | Stop reason: HOSPADM

## 2021-03-17 RX ORDER — CAFFEINE CITRATE 20 MG/ML
60 SOLUTION INTRAVENOUS
Status: DISCONTINUED | OUTPATIENT
Start: 2021-03-17 | End: 2021-03-18 | Stop reason: HOSPADM

## 2021-03-17 RX ORDER — GADOBUTROL 604.72 MG/ML
10 INJECTION INTRAVENOUS ONCE
Status: COMPLETED | OUTPATIENT
Start: 2021-03-17 | End: 2021-03-17

## 2021-03-17 RX ORDER — REGADENOSON 0.08 MG/ML
0.4 INJECTION, SOLUTION INTRAVENOUS ONCE
Status: COMPLETED | OUTPATIENT
Start: 2021-03-17 | End: 2021-03-17

## 2021-03-17 RX ORDER — DIPHENHYDRAMINE HYDROCHLORIDE 50 MG/ML
25-50 INJECTION INTRAMUSCULAR; INTRAVENOUS
Status: DISCONTINUED | OUTPATIENT
Start: 2021-03-17 | End: 2021-03-18 | Stop reason: HOSPADM

## 2021-03-17 RX ORDER — DIAZEPAM 5 MG
5 TABLET ORAL EVERY 30 MIN PRN
Status: DISCONTINUED | OUTPATIENT
Start: 2021-03-17 | End: 2021-03-18 | Stop reason: HOSPADM

## 2021-03-17 RX ADMIN — REGADENOSON 0.4 MG: 0.08 INJECTION, SOLUTION INTRAVENOUS at 10:49

## 2021-03-17 RX ADMIN — GADOBUTROL 10 ML: 604.72 INJECTION INTRAVENOUS at 10:59

## 2021-03-17 RX ADMIN — AMINOPHYLLINE 100 MG: 25 INJECTION, SOLUTION INTRAVENOUS at 10:51

## 2021-03-17 RX ADMIN — GADOBUTROL 10 ML: 604.72 INJECTION INTRAVENOUS at 11:00

## 2021-03-17 RX ADMIN — ASPIRIN 325 MG: 325 TABLET, COATED ORAL at 12:12

## 2021-03-17 ASSESSMENT — MIFFLIN-ST. JEOR: SCORE: 1525.26

## 2021-03-17 NOTE — H&P
History and Physical: Cardiology Cath Lab Service    Everton Larios MRN# [id number]   YOB: 1965 Age: 55 year old         Assessment and plan:   54 yo male with history of heart transplant presents for scheduled surveillance procedures ([facility] heart cath with biopsy, coronary angiogram, and IVUS).   # s/p OHT 2/24/2019  # Heart transplant surveillance  # Elevated CK  - Patient's primary cardiologist, Dr. Donis, was contacted regarding the elevated CK level of 1052. Repeat CK level was obtained. Dr. Donis is ok with proceeding with procedures.   - Patient denies any changes to medications.   - tacrolimus level was 7.3, sirolimus level 7.0  - follow up with the heart transplant clinic regarding CK and tacrolimus levels  - EKG today read out by the machine as ST elevation but per my review, this is unchanged from previous ECG. Patient has known nonspecific intraventricular conduction delay.   - No contraindications to proceeding with procedures  - Patient will be admitted as OP to Obs unit post procedure, with plans to discharge home after post procedure orders have been met    # Acute on chronic kidney disease stage 2  Baseline creatinine 1.2. Creatinine today is 1.80. BUN is 32. This could be prerenal secondary to dehydration from NPO status.   - Primary cardiology will follow       Consenting/Education for Right Heart Catheterization/Endomyocardial Biopsy     Patient understands as a part of routine surveillance after heart transplant we would like to perform a right heart catheterization/endomyocardial biopsy.  This procedure will be performed by Dr. Mckeon.     Patient understands during the portion for right heart catheterization a fine tube (catheter) is put into the vein of the groin/neck.  It is carefully passed along until it reaches the heart and then goes up into the blood vessels of the lungs. This is done to measure a variety of pressures in your heart and can tell us how well the heart  is filling and emptying, as well as monitor fluid status. While the catheter is in your neck/groin vein, a  Biopsy forceps  or pincers at the end of the catheter are used to take the tissue samples. This is may be repeated to get enough samples. The biopsy pieces are very small (one to two millimeters).     Patient also understands risks and complications of the procedure which include, but are not limited to bruising/swelling around the incision site, infection, bleeding, allergic reaction to local anesthetic.      Patient verbalized understanding of risks and benefits of the right heart catheterization and endomyocardial biopsy and has elected to proceed with the procedure.     Patient seen and discussed with Dr. Linda Barnett PA-C  [facility] Cardiology Cath Lab Services  [phone]        HPI:   Everton Larios is a 55 year old male with a history of ASD (s/p repair in childhood), AFib/AF (s/p ablation), NICM, diastolic dysfunction, alcohol abuse, Pierce's esophagus, ulcerative colitis, GIB (s/p splenic embolization), and hypothyroidism, now s/p OHT and bypass of persistent left SVC to right atrial appendage 2/24/19.     He follows with Dr. Donis in the [facility]. He presents today for scheduled surveillance procedures including right heart catheterization and biopsy, coronary angiogram, and IVUS.     Last heart biopsy was 5/5/2020. Last right heart cath was 2/12/2020: RA 7, RV 30/9, PA 30/16/20, PCW 12, Jaden 5.68/3.13. Last coronary angiogram was 2/12/2020 and revealed mild intimal hyperplasia with up to 0.3 mm HERACLIO and 20% narrowing by area of the LAD.     Patient reports feeling well today, denies recent illness, chest pain, shortness of breath, abdominal pain, headache, vision change, or weakness. No major changes in health history since previous visit. No changes in his medications.     Creatinine was elevated today at 1.80 from 1.20. CK total is also  elevated at 1052 from 139 on 9/16/2020.    Past Medical History:   Diagnosis Date     Alcohol abuse      Pierce's esophagus 10/4/2018     Chronic rhinitis 10/4/2018     Chronic systolic heart failure (H) 10/4/2018     Depression 10/4/2018     Diastolic dysfunction      DJD (degenerative joint disease) - neck 10/4/2018     H/O congenital atrial septal defect (ASD) repair at age 3 10/4/2018     Hypothyroidism due to medication 10/4/2018     ICD, [employer] 2008; gen change 2/2018 10/4/2018     Intermittent asthma without complication 10/4/2018     Nonischemic cardiomyopathy (H) 10/4/2018     On amiodarone therapy 10/4/2018     Paroxysmal atrial fibrillation (H) 10/4/2018     S/P ablation of atrial fibrillation 10/4/2018     Systolic heart failure (H)      Ulcerative colitis (H) 2005     Ventricular tachycardia (H) 10/4/2018       Past Surgical History:   Procedure Laterality Date     AICD, DUAL CHAMBER       BRONCHOSCOPY (RIGID OR FLEXIBLE), DIAGNOSTIC N/A 4/30/2019    Procedure: BRONCHOSCOPY, WITH BAL;  Surgeon: Margot Chiang MD;  Location:  GI     COLONOSCOPY N/A 2/20/2020    Procedure: COLONOSCOPY, WITH POLYPECTOMY AND BIOPSY;  Surgeon: Ranjeet Cooper MD;  Location:  GI     CV CORONARY ANGIOGRAM N/A 2/12/2020    Procedure: CV CORONARY ANGIOGRAM;  Surgeon: Isiah Mcallister MD;  Location:  HEART CARDIAC CATH LAB     CV HEART BIOPSY N/A 3/4/2019    Procedure: Heart Biopsy;  Surgeon: Abhijeet Titus MD;  Location:  HEART CARDIAC CATH LAB     CV HEART BIOPSY N/A 3/25/2019    Procedure: Heart Biopsy;  Surgeon: Yves Rodrigeus MD;  Location:  HEART CARDIAC CATH LAB     CV HEART BIOPSY N/A 3/28/2019    Procedure: Heart Cath Heart Biopsy;  Surgeon: Yves Rodrigues MD;  Location:  HEART CARDIAC CATH LAB     CV HEART BIOPSY N/A 4/10/2019    Procedure: CV HEART BIOPSY;  Surgeon: Isiah Mcallister MD;  Location:  HEART CARDIAC CATH LAB     CV HEART BIOPSY N/A 4/24/2019     Procedure: CV HEART BIOPSY;  Surgeon: Isiah Mcallister MD;  Location:  HEART CARDIAC CATH LAB     CV HEART BIOPSY N/A 5/8/2019    Procedure: CV HEART BIOPSY;  Surgeon: Isiah Mcallister MD;  Location:  HEART CARDIAC CATH LAB     CV HEART BIOPSY N/A 6/4/2019    Procedure: CV HEART BIOPSY;  Surgeon: Alton Solomon MD;  Location:  HEART CARDIAC CATH LAB     CV HEART BIOPSY N/A 5/22/2019    Procedure: CV HEART BIOPSY;  Surgeon: Yves Rodrigues MD;  Location:  HEART CARDIAC CATH LAB     CV HEART BIOPSY N/A 7/17/2019    Procedure: CV HEART BIOPSY;  Surgeon: Isiah Mcallister MD;  Location:  HEART CARDIAC CATH LAB     CV HEART BIOPSY N/A 8/13/2019    Procedure: CV HEART BIOPSY;  Surgeon: Jackson Patten MD;  Location:  HEART CARDIAC CATH LAB     CV HEART BIOPSY N/A 10/15/2019    Procedure: CV HEART BIOPSY;  Surgeon: Santino Mckeon MD;  Location:  HEART CARDIAC CATH LAB     CV HEART BIOPSY N/A 2/12/2020    Procedure: CV HEART BIOPSY;  Surgeon: Isiah Mcallister MD;  Location:  HEART CARDIAC CATH LAB     CV HEART BIOPSY N/A 5/5/2020    Procedure: CV HEART BIOPSY;  Surgeon: Yves Rodrigues MD;  Location:  HEART CARDIAC CATH LAB     CV INTRA-AORTIC BALLOON PUMP INSERTION N/A 2/18/2019    Procedure: Intra-Aortic Balloon;  Surgeon: Alton Solomon MD;  Location:  HEART CARDIAC CATH LAB     CV INTRA-AORTIC BALLOON PUMP INSERTION N/A 2/20/2019    Procedure: Replace subclavian IABP;  Surgeon: Yves Rodrigues MD;  Location:  HEART CARDIAC CATH LAB     CV INTRAVASULAR ULTRASOUND N/A 2/12/2020    Procedure: CV INTRAVASCULAR ULTRASOUND;  Surgeon: Isiah Mcallister MD;  Location:  HEART CARDIAC CATH LAB     CV LEFT HEART CATH N/A 3/19/2019    Procedure: Left Heart Cath;  Surgeon: Yves Rodrigues MD;  Location:  HEART CARDIAC CATH LAB     CV LEFT HEART CATH N/A 2/12/2020    Procedure: Left Heart Cath;  Surgeon: Isiah Mcallister MD;  Location:   HEART CARDIAC CATH LAB     CV RIGHT HEART CATH N/A 3/19/2019    Procedure: RHC/HBx - Femoral access for 3/19 per Nimisha GONZALEZ;  Surgeon: Yves Rodrigues MD;  Location:  HEART CARDIAC CATH LAB     CV RIGHT HEART CATH N/A 3/11/2019    Procedure: ADD ON RHC/HBX;  Surgeon: Alton Solomon MD;  Location:  HEART CARDIAC CATH LAB     CV RIGHT HEART CATH MEASUREMENTS RECORDED N/A 3/25/2019    Procedure: Right Heart Cath;  Surgeon: Yves Rodrigues MD;  Location:  HEART CARDIAC CATH LAB     CV RIGHT HEART CATH MEASUREMENTS RECORDED N/A 3/28/2019    Procedure: Heart Cath Right Heart Cath;  Surgeon: Yves Rodrigues MD;  Location:  HEART CARDIAC CATH LAB     CV RIGHT HEART CATH MEASUREMENTS RECORDED N/A 4/24/2019    Procedure: CV RIGHT HEART CATH;  Surgeon: Isiah Mcallister MD;  Location:  HEART CARDIAC CATH LAB     CV RIGHT HEART CATH MEASUREMENTS RECORDED N/A 6/4/2019    Procedure: CV RIGHT HEART CATH;  Surgeon: Alton Solomon MD;  Location:  HEART CARDIAC CATH LAB     CV RIGHT HEART CATH MEASUREMENTS RECORDED N/A 5/22/2019    Procedure: CV RIGHT HEART CATH;  Surgeon: Yves Rodrigues MD;  Location:  HEART CARDIAC CATH LAB     CV RIGHT HEART CATH MEASUREMENTS RECORDED N/A 8/13/2019    Procedure: CV RIGHT HEART CATH;  Surgeon: Jackson Patten MD;  Location:  HEART CARDIAC CATH LAB     CV RIGHT HEART CATH MEASUREMENTS RECORDED N/A 10/15/2019    Procedure: CV RIGHT HEART CATH;  Surgeon: Santino Mckeon MD;  Location:  HEART CARDIAC CATH LAB     CV RIGHT HEART CATH MEASUREMENTS RECORDED N/A 2/12/2020    Procedure: CV RIGHT HEART CATH;  Surgeon: Isiah Mcallister MD;  Location:  HEART CARDIAC CATH LAB     INCISION AND DRAINAGE CHEST WASHOUT, COMBINED N/A 3/21/2019    Procedure: Incision And Drainage; Evacuation Right Chest Wall Hematoma;  Surgeon: Dewayne House MD;  Location:  OR     INSERT INTRAAORTIC BALLOON PUMP Left 2/19/2019    Procedure: Insert  left  Subclavian Balloon Pump,  Removal Right femoral arterial balloom pump sheath;  Surgeon: Dewayne House MD;  Location: [facility] OR     INSERT INTRAAORTIC BALLOON PUMP Left 2/21/2019    Procedure: SUBCLAVIAN BALLOON PUMP PLACEMENT;  Surgeon: Ben White MD;  Location: [facility] OR     IR PICC PLACEMENT > 5 YRS OF AGE  5/8/2019     TRANSPLANT HEART RECIPIENT N/A 2/24/2019    Procedure: TRANSPLANT HEART RECIPIENT;  Surgeon: Dewayne House MD;  Location: [facility] OR       No current facility-administered medications on file prior to encounter.   acetaminophen (TYLENOL) 325 MG tablet, Take 2 tablets (650 mg) by mouth every 4 hours as needed for mild pain  albuterol (PROAIR HFA/PROVENTIL HFA/VENTOLIN HFA) 108 (90 Base) MCG/ACT inhaler, Inhale 2 puffs into the lungs every 6 hours as needed for shortness of breath / dyspnea or wheezing  alendronate (FOSAMAX) 70 MG tablet, Take 1 tablet (70 mg) by mouth every 7 days Take 60 minutes before am meal with 8 oz. water. Remain upright for 30 minutes.  amLODIPine (NORVASC) 5 MG tablet, Take 2 tablets (10 mg) by mouth daily  aspirin (ASA) 81 MG chewable tablet, Take 1 tablet (81 mg) by mouth daily  balsalazide (COLAZAL) 750 MG capsule, TAKE 5 CAPSULES IN AM AND 4 CAPSULES IN PM  calcium carbonate 600 mg-vitamin D 400 units (CALTRATE) 600-400 MG-UNIT per tablet, Take 1 tablet by mouth 2 times daily (with meals)  cetirizine (ZYRTEC) 10 MG tablet, Take 10 mg by mouth daily  DULoxetine (CYMBALTA) 20 MG EC capsule, Take 20 mg by mouth daily  fluticasone (FLOVENT HFA) 220 MCG/ACT Inhaler, Inhale 2 puffs into the lungs 2 times daily  levothyroxine (SYNTHROID/LEVOTHROID) 100 MCG tablet, MON to SAT 1 tablet/day;SUN 0.5 tablet  magnesium oxide (MAG-OX) 400 MG tablet, Take 1 tablet (400 mg) by mouth 2 times daily (Patient not taking: Reported on 12/18/2019)  melatonin 3 MG tablet, Take 2 tablets (6 mg) by mouth At Bedtime (Patient taking differently: Take 5 mg by mouth nightly as needed  )  mesalamine (ASACOL HD) 800 MG EC tablet, Take 1 tablet (800 mg) by mouth 3 times daily (Patient not taking: Reported on 2020)  multivitamin w/minerals (THERA-VIT-M) tablet, Take 1 tablet by mouth daily  omeprazole (PRILOSEC) 40 MG DR capsule, Take 1 capsule (40 mg) by mouth daily  ondansetron (ZOFRAN-ODT) 4 MG ODT tab, DISSOLVE 1 TABLET ON THE TONGUE EVERY EIGHT HOURS AS NEEDED  polyethylene glycol (MIRALAX/GLYCOLAX) powder, Take 1 capful by mouth daily  rosuvastatin (CRESTOR) 10 MG tablet, Take 1 tablet (10 mg) by mouth daily  senna (SENOKOT) 8.6 MG tablet, Take 2 tablets by mouth 2 times daily as needed for constipation  sirolimus (GENERIC EQUIVALENT) 0.5 MG tablet, Take 6 tablets (3 mg) by mouth daily.  tacrolimus (GENERIC EQUIVALENT) 0.5 MG capsule, On HOLD taking 3 mg bid.  tacrolimus (GENERIC EQUIVALENT) 1 MG capsule, Take 3 capsules (3 mg) by mouth every morning AND 2 capsules (2 mg) every evening.        Family History   Problem Relation Age of Onset     Endometrial Cancer Mother      Osteoporosis Mother      Hypertension Father      Endometrial Cancer Maternal Grandmother      Hip fracture No family hx of      Nephrolithiasis No family hx of        Social History     Tobacco Use     Smoking status: Former Smoker     Packs/day: 0.00     Years: 10.00     Pack years: 0.00     Start date: 1980     Quit date: 2008     Years since quittin.2     Smokeless tobacco: Never Used   Substance Use Topics     Alcohol use: Yes     Alcohol/week: 1.0 - 2.0 standard drinks     Types: 1 - 2 Cans of beer per week     Frequency: Monthly or less     Drinks per session: 1 or 2     Binge frequency: Less than monthly       Allergies   Allergen Reactions     Hydromorphone Other (See Comments)     Significant Delirium     Adhesive Tape Blisters     Tegaderm causes bruises, blisters and extreme irritation.     Lisinopril Other (See Comments)     hypotension     Quinolones Other (See Comments)     Would not rx  quinolones until QTc interval improved.      Tobramycin Other (See Comments)     Would not use aminoglycosides as his kidney function is very borderline     Codeine Nausea         ROS:   Skin: negative  Respiratory: No shortness of breath, dyspnea on exertion, cough, or hemoptysis  Cardiovascular: negative  Gastrointestinal: negative  Genitourinary: negative  Musculoskeletal: negative  Neurologic: negative  Hematologic/Lymphatic/Immunologic: negative  Endocrine: negative    Physical Examination:  Vitals: There were no vitals taken for this visit.  BMI= There is no height or weight on file to calculate BMI.    GENERAL APPEARANCE: healthy, alert and no distress  HEENT: no icterus, no xanthelasmas, normal pupil size and reaction, normal palate, mucosa moist  NECK: JVP not elevated  CHEST: lungs clear to auscultation without rales, rhonchi or wheezes, no use of accessory muscles, no retractions  CARDIOVASCULAR: regular rhythm, normal S1 and S2, no S3 or S4 and no murmur, click or rub.  ABDOMEN: soft, non tender, bowel sounds normal  EXTREMITIES: warm, no peripheral edema, DP/PT pulses 2+ bilaterally, no clubbing or cyanosis   NEURO: alert and oriented to person/place/time, normal speech  PSYCH: mood and affect are appropriate  SKIN: no ecchymoses, no rashes      Laboratory:  CMP  Recent Labs   Lab 03/17/21  0813      POTASSIUM 3.9   CHLORIDE 106   CO2 24   ANIONGAP 8   GLC 85   BUN 32*   CR 1.80*   GFRESTIMATED 41*   GFRESTBLACK 47*   RADAMES 9.0   MAG 1.6   PHOS 2.6   PROTTOTAL 7.4   ALBUMIN 3.6   BILITOTAL 0.5   ALKPHOS 131   AST 48*   ALT 49     CBC  Recent Labs   Lab 03/17/21  0813   WBC 6.2   RBC 5.08   HGB 12.5*   HCT 37.8*   MCV 74*   MCH 24.6*   MCHC 33.1   RDW 15.2*

## 2021-03-17 NOTE — PROGRESS NOTES
D/I/A:  Patient is tolerating liquids and foods, ambulating, urinating, puncture sites are stable ( no bleeding and no hematoma) and patient has a .  A+O x4 and making needs known.  CCL access sites C/D/I; no bleeding or hematoma; CMS intact.  VSSA.  Sinus rhythm on monitor.  IV access removed.  Education completed and outlined in AVS or handout: medications reviewed with patient.  Questions answered prior to discharge.  Belongings returned to patient at discharge.    P: Discharged to self care.  Patient to follow up with appts as per discharge instruction.

## 2021-03-17 NOTE — PROGRESS NOTES
Pt here for cardiac MRI with stress. Safety checklist, allergies, and meds all reviewed. Test explained and all questions answered. Lungs clear. Denied caffeine intake. Lexiscan 0.4mg given over 15 seconds followed by 5cc NS flush. Aminophylline 100mg post Nikki injection per protocol. Pt tolerated scan, meds, and contrast well; stable throughout. Pre and post EKG completed. Pt monitored post MRI and escorted back to gold waiting room.

## 2021-03-17 NOTE — PRE-PROCEDURE
GENERAL PRE-PROCEDURE:     Written consent obtained?: Yes    Risks and benefits: Risks, benefits and alternatives were discussed    Consent given by:  Patient  Patient states understanding of procedure being performed: Yes    Patient's understanding of procedure matches consent: Yes    Procedure consent matches procedure scheduled: Yes    Expected level of sedation:  Moderate  Appropriately NPO:  Yes  ASA Class:  Class 1- healthy patient  Mallampati  :  Grade 2- soft palate, base of uvula, tonsillar pillars, and portion of posterior pharyngeal wall visible  Lungs:  Lungs clear with good breath sounds bilaterally  Heart:  Normal heart sounds and rate  History & Physical reviewed:  History and physical reviewed and no updates needed  Statement of review:  I have reviewed the lab findings, diagnostic data, medications, and the plan for sedation      Consenting/Education for Right Heart Catheterization   Patient understands as a part of routine surveillance after heart transplant we would like to perform a right heart catheterization. This procedure will be performed by Dr. Mckeon    Patient understands during the portion for right heart catheterization a fine tube (catheter) is put into the vein of the groin/neck.  It is carefully passed along until it reaches the heart and then goes up into the blood vessels of the lungs. This is done to measure a variety of pressures in your heart and can tell us how well the heart is filling and emptying, as well as monitor fluid status.     Patient also understands risks and complications of the procedure which include, but are not limited to bruising/swelling around the incision site, infection, bleeding, allergic reaction to local anesthetic.      Patient verbalized understanding of risks and benefits of the right heart catheterization and has elected to proceed with the procedure.       Fely Barnett PA-C  [facility]  [phone]

## 2021-03-17 NOTE — PROGRESS NOTES
Pt CK recheck is 824.  Pt is noted to have ST Elevation on todays EKG per Jocelynn (transplant team).  Transplant team and charge RN notified.  Transplant team will come evaluate patient.  Procedure on hold for now.

## 2021-03-17 NOTE — DISCHARGE INSTRUCTIONS
Going Home after an Angioplasty or Stent Placement (Cardiac)  ______________________________________________      After you go home:    Have an adult stay with you for 24 hours.    Drink plenty of fluids.    You may eat your normal diet, unless your doctor tells you otherwise.    For 24 hours:    Relax and take it easy.    Do NOT smoke.    Do NOT make any important or legal decisions.    Do NOT drive or operate machines at home or at work.    Do NOT drink alcohol.    Remove the Band-Aid after 24 hours. If there is minor oozing, apply another Band-aid and remove it after 12 hours.    For 2 days, do NOT have sex or do any heavy exercise.    Do NOT take a bath, or use a hot tub or pool for at least 3 days. You may shower.    Care of groin site  It is normal to have a small bruise or lump at the site.    Do not scrub the site.    For the first 2 days: Do not stoop or squat. When you cough, sneeze or move your bowels, hold your hand over the puncture site and press gently.    Do not lift more than 10 pounds for at least 3 to 5 days.    Do not use lotion or powder near the puncture site for 3 days.    If you start bleeding from the site in your groin, lie down flat and press firmly  on the site. Call your doctor as soon as you can.    Care of wrist or arm site  It is normal to have soreness at the puncture site and mild tingling in your hand for up to 3 days.    For 2 days, do not use your hand or arm to support your weight (such as rising from a chair) or bend your wrist (such as lifting a garage door).    For 2 days, do not lift more than 5 pounds or exercise your arm (tennis, golf or bowling).    If you start bleeding from the site in your arm:    Sit down and press firmly on the site with your fingers for 10 minutes. Call your doctor as soon as you can.    If the bleeding stops, sit still and keep your wrist straight for 2 hours.    Medicines    If you have started taking Plavix or Effient, do not stop taking it  until you talk to your heart doctor (cardiologist).    If you are on metformin (Glucophage), do not restart it until you have blood tests (within 2 to 3 days after discharge). When your doctor tells you it is safe, you may restart the metformin.    If you have stopped any other medicines, check with your nurse or provider about when to restart them.    Call 911 right away if you have bleeding that is heavy or does not stop.    Call your doctor if:    You have a large or growing hard lump around the site.    The site is red, swollen, hot or tender.    Blood or fluid is draining from the site.    You have chills or a fever greater than 101 F (38 C).    Your leg or arm feels numb or cool.    You have hives, a rash or unusual itching.    [facility] [place]:  [phone] (7 days a week)

## 2021-03-17 NOTE — Clinical Note
dry, intact, no bleeding and no hematoma. LRA sheath removed, TR Band in place with 14 ml of air. Proximal and distal markings, clean dry and intact.

## 2021-03-17 NOTE — PLAN OF CARE
D/I/A: Pt roomed on 3C in bay 35.  Arrived via litter and accompanied by RN On/Off: On monitor.  VSSA.  Rhythm upon arrival sinus tach on monitor.  Denies pain or sob.  Reviewed activity restrictions and when to notify RN, ie-changes to breathing or increased chest pressure or chest pain.  CCL access:  Right femoral and left radial.  P: Continue to monitor status.  Discharge to home once meeting criteria.

## 2021-03-17 NOTE — Clinical Note
dry, intact, no bleeding and no hematoma. 7 Fr RIJV sheath removed, manual pressure held until hemostasis. No bleeding, oozing, or hematoma.  4 Fr RFA sheath removed, manual pressure held until hemostasis. No bleeding, oozing, or hematoma.

## 2021-03-18 LAB
COPATH REPORT: NORMAL
DONOR IDENTIFICATION: NORMAL
DSA COMMENTS: NORMAL
DSA PRESENT: NO
DSA TEST METHOD: NORMAL
INTERPRETATION ECG - MUSE: NORMAL
INTERPRETATION ECG - MUSE: NORMAL
ORGAN: NORMAL
SA1 CELL: NORMAL
SA1 COMMENTS: NORMAL
SA1 HI RISK ABY: NORMAL
SA1 MOD RISK ABY: NORMAL
SA1 TEST METHOD: NORMAL
SA2 CELL: NORMAL
SA2 COMMENTS: NORMAL
SA2 HI RISK ABY UA: NORMAL
SA2 MOD RISK ABY: NORMAL
SA2 TEST METHOD: NORMAL
UNACCEPTABLE ANTIGEN: NORMAL
UNOS CPRA: 0

## 2021-03-19 ENCOUNTER — AMBULATORY - HEALTHEAST (OUTPATIENT)
Dept: NURSING | Facility: CLINIC | Age: 56
End: 2021-03-19

## 2021-03-19 LAB
ALLOMAP SCORE (EXTERNAL): 33
EBV DNA # SPEC NAA+PROBE: 1107 {COPIES}/ML
EBV DNA SPEC NAA+PROBE-LOG#: 3 {LOG_COPIES}/ML
IMMUKNOW IMMUNE CELL FUNCTION: NORMAL
NEGATIVE PREDICTIVE VALUE PERCENT (EXTERNAL): 99.1 %
POSITIVE PREDICTIVE VALUE PERCENT (EXTERNAL): 3.8 %

## 2021-03-24 ENCOUNTER — AMBULATORY - HEALTHEAST (OUTPATIENT)
Dept: LAB | Facility: CLINIC | Age: 56
End: 2021-03-24

## 2021-03-24 DIAGNOSIS — Z94.1 TRANSPLANTED HEART (H): ICD-10-CM

## 2021-03-24 LAB
ANION GAP SERPL CALCULATED.3IONS-SCNC: 11 MMOL/L (ref 5–18)
BUN SERPL-MCNC: 30 MG/DL (ref 8–22)
CALCIUM SERPL-MCNC: 8.6 MG/DL (ref 8.5–10.5)
CHLORIDE BLD-SCNC: 108 MMOL/L (ref 98–107)
CK SERPL-CCNC: 177 U/L (ref 30–190)
CO2 SERPL-SCNC: 20 MMOL/L (ref 22–31)
CREAT SERPL-MCNC: 1.64 MG/DL (ref 0.7–1.3)
ERYTHROCYTE [DISTWIDTH] IN BLOOD BY AUTOMATED COUNT: 15.1 % (ref 11–14.5)
GFR SERPL CREATININE-BSD FRML MDRD: 44 ML/MIN/1.73M2
GLUCOSE BLD-MCNC: 96 MG/DL (ref 70–125)
HCT VFR BLD AUTO: 34.4 % (ref 40–54)
HGB BLD-MCNC: 11.5 G/DL (ref 14–18)
MCH RBC QN AUTO: 24.5 PG (ref 27–34)
MCHC RBC AUTO-ENTMCNC: 33.4 G/DL (ref 32–36)
MCV RBC AUTO: 73 FL (ref 80–100)
PLATELET # BLD AUTO: 201 THOU/UL (ref 140–440)
PMV BLD AUTO: 8.8 FL (ref 7–10)
POTASSIUM BLD-SCNC: 4.4 MMOL/L (ref 3.5–5)
RBC # BLD AUTO: 4.7 MILL/UL (ref 4.4–6.2)
SODIUM SERPL-SCNC: 139 MMOL/L (ref 136–145)
WBC: 5.5 THOU/UL (ref 4–11)

## 2021-03-25 LAB
SIROLIMUS BLD-MCNC: 6.6 UG/L (ref 5–15)
TME LAST DOSE: NORMAL H

## 2021-03-27 ENCOUNTER — HEALTH MAINTENANCE LETTER (OUTPATIENT)
Age: 56
End: 2021-03-27

## 2021-03-31 ENCOUNTER — TELEPHONE (OUTPATIENT)
Dept: TRANSPLANT | Facility: CLINIC | Age: 56
End: 2021-03-31

## 2021-03-31 NOTE — TELEPHONE ENCOUNTER
Pt was suppose to have tacro repeat and lab forgot to draw. Pt will have completed as soon as able. Lab letter sent to patient via [employer].

## 2021-03-31 NOTE — LETTER
2021    Patient Name: Everton Larios   :  1965   MRN: [id number]  ICD10:  z94.1 , z79.899    Subject: Request for Labs    Everton Larios is a heart transplant patient currently being followed by the [facility].  We would like to request your assistance in obtaining the following laboratory tests which are part of our routine surveillance program for heart transplant recipients.  (Items needed are checked.)    Please fax the lab results as soon as they are available to:   Thoracic Transplant Department  Fax Number:  [phone]     Item Frequency   x CBC with platelets Standing orders with all drug level changes. Qty: 20. Expires 3/2022   x Basic metabolic panel (including:  BUN,   Serum Creatinine, Sodium, Potassium, Chloride,   CO2, Calcium, Magnesium, Phosphorus, and   Glucose) Standing orders with all drug level changes. Qty: 20. Expires 3/2022   x Rapamune (24 hour trough) and/or tacrolimus/Prograf level (12 hour trough)  (Should be mailed to the [facility] in the  provided to you by the patient.) Standing orders with all drug level changes. Qty: 20. Expires 3/2022   x CK x1 2021.      Thank you  for your continued support and the opportunity to collaborate in the care of this patient.  If you have any questions, please call the Thoracic Transplant Team at [phone] or [phone].    .

## 2021-04-09 ENCOUNTER — TRANSFERRED RECORDS (OUTPATIENT)
Dept: HEALTH INFORMATION MANAGEMENT | Facility: CLINIC | Age: 56
End: 2021-04-09

## 2021-04-09 ENCOUNTER — AMBULATORY - HEALTHEAST (OUTPATIENT)
Dept: LAB | Facility: CLINIC | Age: 56
End: 2021-04-09

## 2021-04-09 ENCOUNTER — AMBULATORY - HEALTHEAST (OUTPATIENT)
Dept: NURSING | Facility: CLINIC | Age: 56
End: 2021-04-09

## 2021-04-09 DIAGNOSIS — Z94.1 HEART REPLACED BY TRANSPLANT (H): ICD-10-CM

## 2021-04-09 DIAGNOSIS — Z94.1 TRANSPLANTED HEART (H): ICD-10-CM

## 2021-04-09 LAB
ANION GAP SERPL CALCULATED.3IONS-SCNC: 9 MMOL/L (ref 5–18)
BUN SERPL-MCNC: 29 MG/DL (ref 8–22)
CALCIUM SERPL-MCNC: 8.4 MG/DL (ref 8.5–10.5)
CHLORIDE BLD-SCNC: 110 MMOL/L (ref 98–107)
CO2 SERPL-SCNC: 22 MMOL/L (ref 22–31)
CREAT SERPL-MCNC: 1.65 MG/DL (ref 0.7–1.3)
ERYTHROCYTE [DISTWIDTH] IN BLOOD BY AUTOMATED COUNT: 14.9 %
ERYTHROCYTE [DISTWIDTH] IN BLOOD BY AUTOMATED COUNT: 14.9 % (ref 11–14.5)
GFR SERPL CREATININE-BSD FRML MDRD: 43 ML/MIN/1.73M2
GLUCOSE BLD-MCNC: 128 MG/DL (ref 70–125)
HCT VFR BLD AUTO: 34.6 %
HCT VFR BLD AUTO: 34.6 % (ref 40–54)
HEMOGLOBIN: 11.2 G/DL (ref 13.3–17.7)
HGB BLD-MCNC: 11.2 G/DL (ref 14–18)
MCH RBC QN AUTO: 24.3 PG
MCH RBC QN AUTO: 24.3 PG (ref 27–34)
MCHC RBC AUTO-ENTMCNC: 32.4 G/DL
MCHC RBC AUTO-ENTMCNC: 32.4 G/DL (ref 32–36)
MCV RBC AUTO: 75 FL
MCV RBC AUTO: 75 FL (ref 80–100)
PLATELET # BLD AUTO: 232 10^9/L
PLATELET # BLD AUTO: 232 THOU/UL (ref 140–440)
PMV BLD AUTO: 9.1 FL (ref 7–10)
POTASSIUM BLD-SCNC: 4.4 MMOL/L (ref 3.5–5)
RBC # BLD AUTO: 4.61 10^12/L
RBC # BLD AUTO: 4.61 MILL/UL (ref 4.4–6.2)
SIROLIMUS BLD-MCNC: 6.2 UG/L (ref 5–15)
SODIUM SERPL-SCNC: 141 MMOL/L (ref 136–145)
TACROLIMUS BLD-MCNC: 4.9 UG/L (ref 5–15)
TACROLIMUS LAST DOSE: ABNORMAL
TME LAST DOSE: 2030 H
WBC # BLD AUTO: 5 10^9/L
WBC: 5 THOU/UL (ref 4–11)

## 2021-05-11 ENCOUNTER — COMMUNICATION - HEALTHEAST (OUTPATIENT)
Dept: INTERNAL MEDICINE | Facility: CLINIC | Age: 56
End: 2021-05-11

## 2021-05-11 DIAGNOSIS — F34.1 DYSTHYMIA: ICD-10-CM

## 2021-05-15 DIAGNOSIS — Z94.1 HEART REPLACED BY TRANSPLANT (H): ICD-10-CM

## 2021-05-17 RX ORDER — ROSUVASTATIN CALCIUM 10 MG/1
TABLET, COATED ORAL
Qty: 90 TABLET | Refills: 3 | Status: SHIPPED | OUTPATIENT
Start: 2021-05-17 | End: 2022-02-28

## 2021-05-24 ENCOUNTER — RECORDS - HEALTHEAST (OUTPATIENT)
Dept: ADMINISTRATIVE | Facility: CLINIC | Age: 56
End: 2021-05-24

## 2021-05-26 ASSESSMENT — PATIENT HEALTH QUESTIONNAIRE - PHQ9: SUM OF ALL RESPONSES TO PHQ QUESTIONS 1-9: 18

## 2021-05-27 ASSESSMENT — PATIENT HEALTH QUESTIONNAIRE - PHQ9: SUM OF ALL RESPONSES TO PHQ QUESTIONS 1-9: 0

## 2021-05-27 NOTE — PROGRESS NOTES
[facility] Emergency clinic Follow Up Note    Everton Larios   54 y.o. male    Date of Visit: 4/18/2019    Chief Complaint   Patient presents with     Follow-up     Post-heart transplant.     You Toscano is here for a hospital follow-up after cardiac transplant and prolonged hospital course.  He was overbooked into schedule without an appointment, unfortunately.    I did do extensive review of medical record with hospital course after transplant of his heart February 24.  He had severe end-stage nonischemic congestive heart failure with ejection fraction of 15%.    Patient had complications in the hospital with acute renal injury.  His creatinine was 1.8 on April 2.  On April 10 creatinine was down to 1.3.  Potassium level is normal.  Sodium level is normal on April 10.    No lower extremity edema or significant shortness of breath now.    He had significant blood loss anemia with hemoglobin increasing to 9.9 on April 10.    His incisions have healed well.  Minimal pain and not on pain medication.    He is lost considerable weight and has global weakness and deconditioning.  No falls since discharge from hospital.  He is going to start cardiac rehab next week.    He did have a complication of Klebsiella pneumonia in the hospital.  I did review the chest CT scan from mid March that was done in the hospital.  There was some nodular opacities noted possibly infectious.  The patient stated that the transplant team would mention he should consider follow-up on that.    The patient has some residual cough but slowly improving.  Minimally productive.  No hemoptysis.  No fever.    Patient was having some mild rejection seen on endocardial biopsy on April 10.  He has another biopsy planned for next week and follow-up with the transplant team next week.    He is on significant immunosuppression, medications reviewed.  He still on prednisone with plans to wean down on that.  On Bactrim prophylaxis Monday and  Thursday.    Still on Bumex 2 mg twice a day.  No lower extremity edema issues or orthopnea.    Heart rate is going about 100, but stable.  No fainting spells.    His blood pressure has been running around 120/70 since the transplant.    The past history of mild intermittent asthma but no recent flare.  Quit smoking back in .  On Singulair and inhalers.    History of dysthymia and depression without exacerbation currently.  On Cymbalta 20 mg and melatonin at night.    On Synthroid for hypothyroidism.  I did review the lab work from March and normal TSH.    Past history of ulcerative colitis but no flare.  On mesalamine twice a day.    He is on Crestor 10 mg a day and low-dose aspirin.    He had atrial fibrillation prior to heart transplant but no longer on warfarin.  No history of stroke.    No further alcohol intake.    No urinary difficulty.    No swallowing difficulty and he is eating better now.  His weight is stabilized.    PMHx:    Past Medical History:   Diagnosis Date     Arthritis     hands, neck     ASD (atrial septal defect)     s/p repair age 3     Asthma      Atrial fibrillation (H)      Pierce's esophagus      Cataract      CHF (congestive heart failure) (H)      Clotting disorder (H)      Congenital cardiomyopathy in  (H)      Depression      DJD (degenerative joint disease)     neck     History of anesthesia complications     nausea     History of transfusion      Hypothyroidism      ICD (implantable cardioverter-defibrillator) in place      Pacemaker      Ulcerative colitis (H)      Ventricular tachycardia (H)      PSHx:    Past Surgical History:   Procedure Laterality Date     ABLATION OF DYSRHYTHMIC FOCUS      for A fib     ASD REPAIR  1968     CARDIAC DEFIBRILLATOR PLACEMENT      x2--last was 2018     COLONOSCOPY N/A 2015    Procedure: COLONOSCOPY with biopsy;  Surgeon: Fernie Akers MD;  Location: [facility];  Service:      COLONOSCOPY N/A 2017    Procedure:  COLONOSCOPY with biopsies and polypectomies using snare;  Surgeon: Gael Romero MD;  Location: [facility];  Service:      ESOPHAGOGASTRODUODENOSCOPY N/A 12/19/2017    Procedure: ESOPHAGOGASTRODUODENOSCOPY (EGD) with biopsies;  Surgeon: Gael Romero MD;  Location: [facility];  Service:      HAND SURGERY Left      LIPOMA RESECTION      x2     TN REVISE MEDIAN N/CARPAL TUNNEL SURG  9/21/2016    Procedure: OPEN RIGHT CARPAL TUNNEL RELEASE CORTISONE INJECTION RIGHT THUMB;  Surgeon: Duong Santoyo MD;  Location: [facility] OR;  Service: Orthopedics     Immunizations:   Immunization History   Administered Date(s) Administered     Influenza, inj, historic,unspecified 10/22/2008, 09/25/2009, 11/07/2011     Influenza, seasonal,quad inj 36+ mos 09/12/2016, 10/16/2017, 09/20/2018     Influenza, seasonal,quad inj 6-35 mos 11/30/2012, 10/02/2014     Influenza,seasonal quad, PF, 36+MOS 10/02/2015     Tdap 07/20/2012       ROS A comprehensive review of systems was performed and was otherwise negative    Medications, allergies, and problem list were reviewed and updated    Exam  /74 (Patient Site: Left Arm, Patient Position: Sitting, Cuff Size: Adult Regular)   Pulse 100   Resp 16   Wt 130 lb (59 kg)   BMI 20.06 kg/m    Alert and oriented x3.  No jaundice.  No JVD.  Lungs are clear to auscultation there is no wheezing or crackles.  No effusion or dullness.  Chest incision is well-healed.  Heart is regular, borderline tachycardic but no systolic murmur rub or gallop.  No ankle edema.  Abdomen is minimally tender soft nondistended.  Able to clamp on the exam table but global deconditioning and muscle atrophy    Assessment/Plan  1. Heart replaced by transplant (H)  Patient was told that his care will largely come from the transplant team now.    I discussed infection risk and other risks with being on his immunosuppression.    Patient was told if any infection issues, increasing shortness of breath or  significant worsening of clinical status, he will need to go to [facility] ER or transplant team clinic.    His medications will be managed through the transplant team or cardiology.    On Crestor and aspirin.    Acute renal injury after transplant.  Creatinine was improving.  That will be managed by his transplant team.    Anemia, improving on last check.  Labs and management of this will also be managed through his transplant team.    2. Abnormal chest CT  Nodular opacity on CT scan likely related to previous Klebsiella pneumonia he had in the hospital.  Clinically he is doing better.    Patient was told that immunosuppression can hide infectious symptoms.    Patient was told to see pulmonary medicine for further evaluation of the chest CT scan findings.  Likely would get another chest CT scan.    This should be managed through his transplant team, and he will see them next week and get a referral to pulmonary medicine in the emergency [place].    Patient was told that if he does not get referral to contact clinic and I can refer him.    On Bactrim prophylaxis.    3. Weakness  Global deconditioning after prolonged hospital course.  He starting to improve.  Eating well now.  Cardiac rehab.    4. Mild persistent asthma without complication  No flare currently.  Continue current inhalers.  We will follow-up with the pulmonary medicine.    5. Ulcerative colitis without complications, unspecified location (H)  No flare.  Managed by GI.  Continue mesalamine.    6. Hypothyroidism, unspecified type  Normal TSH last month.  Continue Synthroid.    7. Pierce's esophagus without dysplasia  Continue omeprazole twice daily.    History of dysthymia, continue Cymbalta.        Return in about 6 weeks (around 5/30/2019) for Recheck.   Patient Instructions   See your heart transplant team who will be your main providers of care now.    Make sure you see pulmonary medicine physician to reevaluate your chest CT scan  findings from March.  Get referral for a [facility] lung specialist who works with the transplant team.    Seek medical attention immediately if any signs of infection or worsening shortness of breath.    Medication adjustments will be done by your transplant team or cardiologist.    Proceed with cardiac rehab as planned.    Follow-up with me in approximately 6 weeks.        Fredrikc Wolff MD  I spent a total time with patient of over 25 minutes and over 50% coord care.  Time all face to face.      Current Outpatient Medications   Medication Sig Dispense Refill     acetaminophen (TYLENOL) 325 MG tablet Take 650 mg by mouth as needed.       albuterol (PROAIR HFA;PROVENTIL HFA;VENTOLIN HFA) 90 mcg/actuation inhaler Inhale 2 puffs.       aspirin 81 mg chewable tablet Chew 81 mg daily.       azelastine (ASTELIN) 137 mcg (0.1 %) nasal spray 2 sprays into each nostril 2 (two) times a day. Use in each nostril as directed 30 mL 5     bumetanide (BUMEX) 2 MG tablet Take 2 mg by mouth 2 (two) times a day.       calcium carbonate-vit D3-min 600 mg calcium- 400 unit Tab Take 1 tablet by mouth daily.       DULoxetine (CYMBALTA) 20 MG capsule Take 1 capsule by mouth daily .             KLOR-CON M20 20 mEq tablet TAKE 1 TABLET(20 MEQ) BY MOUTH DAILY (Patient taking differently: TAKE 2 TABLET(20 MEQ) BY MOUTH TWICE DAILY) 90 tablet 1     levothyroxine (SYNTHROID, LEVOTHROID) 100 MCG tablet Take 1 tablet (100 mcg total) by mouth daily. 30 tablet 3     magnesium oxide (MAG-OX) 400 mg (241.3 mg magnesium) tablet Take 400 mg by mouth daily.       mesalamine (ASACOL HD) 800 mg TbEC EC tablet Take 2,400 mg by mouth 2 (two) times a day.       montelukast (SINGULAIR) 10 mg tablet TAKE 1 TABLET BY MOUTH AT BEDTIME 90 tablet 3     multivitamin with minerals (THERA M PLUS, FERROUS FUMARAT,) 9 mg iron-400 mcg Tab tablet Take 1 tablet by mouth daily.       mycophenolate (CELLCEPT) 250 mg capsule Take 1,500 mg by mouth 2 (two) times  a day.       nystatin (MYCOSTATIN) 100,000 unit/mL suspension Take 500,000 Units by mouth 4 (four) times a day.       omeprazole (PRILOSEC) 40 MG capsule Take 40 mg by mouth every evening.       polyethylene glycol (MIRALAX) 17 gram packet Take 17 g by mouth as needed.       predniSONE (DELTASONE) 5 MG tablet Take 5 mg by mouth every evening Take 10 mg (2 tablets) every am, and 5 mg (1 tablet) every evening.       rosuvastatin (CRESTOR) 10 MG tablet Take 10 mg by mouth daily.       senna-docusate (PERICOLACE) 8.6-50 mg tablet Take 1 tablet by mouth 2 (two) times a day as needed.       sulfamethoxazole-trimethoprim (SEPTRA DS) 800-160 mg per tablet Take 1 tablet by mouth 2 (two) times a week. Taken every Monday and Thursday morning.       tacrolimus (PROGRAF) 0.5 MG capsule Take 1 capsule by mouth every evening.       tacrolimus (PROGRAF) 1 MG capsule Take 3 mg by mouth 2 (two) times a day. Take 3 tablets in the morning and 3 tablets in the evening.       valGANciclovir (VALCYTE) 450 mg tablet Take 900 mg by mouth 2 (two) times a day. Taken in the morning.       VENTOLIN HFA 90 mcg/actuation inhaler TAKE 2 PUFFS BY MOUTH EVERY 6 HOURS AS NEEDED FOR WHEEZE 18 Inhaler 0     alpha-D-galactosidase (BEANO ORAL) Take 2 tablets by mouth 3 (three) times a day.       balsalazide (COLAZAL) 750 mg capsule TAKE 5 CAPSULES BY MOUTH IN THE MORNING AND 4 CAPSULES IN THE EVENING  5     fluticasone (FLONASE) 50 mcg/actuation nasal spray Apply 2 sprays into each nostril daily.       melatonin 3 mg Tab tablet Take 6 mg by mouth at bedtime as needed.       No current facility-administered medications for this visit.      Allergies   Allergen Reactions     Hydromorphone Other (See Comments)     Significant Delirium     Lisinopril Other (See Comments)     hypotension     Codeine Nausea Only     nausea     Social History     Tobacco Use     Smoking status: Former Smoker     Packs/day: 1.00     Years: 25.00     Pack years: 25.00     Last  attempt to quit: 11/6/2008     Years since quitting: 10.4     Smokeless tobacco: Never Used   Substance Use Topics     Alcohol use: Yes     Alcohol/week: 3.6 oz     Types: 6 Standard drinks or equivalent per week     Comment: 2 drinks 3 times weekly when out with friends     Drug use: No

## 2021-05-27 NOTE — TELEPHONE ENCOUNTER
RN cannot approve Refill Request    RN can NOT refill this medication historical medication requested.         Nelida Johnson, [facility] Triage/Med Refill 3/27/2019    Requested Prescriptions   Pending Prescriptions Disp Refills     KLOR-CON M20 20 mEq tablet [Pharmacy Med Name: KLOR-CON M20 TABLET] 90 tablet 0     Sig: TAKE 1 TABLET(20 MEQ) BY MOUTH DAILY    Potassium Supplements Refill Protocol Passed - 3/27/2019  9:32 AM       Passed - PCP or prescribing provider visit in past 12 months      Last office visit with prescriber/PCP: 11/30/2018 Fredrick Wolff MD OR same dept: 11/30/2018 Fredrick Wolff MD OR same specialty: 11/30/2018 Fredrick Wolff MD  Last physical: 9/12/2016 Last MTM visit: Visit date not found   Next visit within 3 mo: Visit date not found  Next physical within 3 mo: Visit date not found  Prescriber OR PCP: Fredrick Wolff MD  Last diagnosis associated with med order: There are no diagnoses linked to this encounter.  If protocol passes may refill for 12 months if within 3 months of last provider visit (or a total of 15 months).            Passed - Potassium level in last 12 months    Lab Results   Component Value Date    Potassium 3.5 10/30/2018

## 2021-05-27 NOTE — PATIENT INSTRUCTIONS - HE
See your heart transplant team who will be your main providers of care now.    Make sure you see pulmonary medicine physician to reevaluate your chest CT scan findings from March.  Get referral for a [facility] lung specialist who works with the transplant team.    Seek medical attention immediately if any signs of infection or worsening shortness of breath.    Medication adjustments will be done by your transplant team or cardiologist.    Proceed with cardiac rehab as planned.    Follow-up with me in approximately 6 weeks.

## 2021-05-28 ASSESSMENT — ASTHMA QUESTIONNAIRES
ACT_TOTALSCORE: 25
ACT_TOTALSCORE: 25

## 2021-05-28 NOTE — PROGRESS NOTES
ITP ASSESSMENT   Assessment Day: 30 Day    Session Number: 2  Precautions: Sternal;  S/P Heart Transplant    Diagnosis: Heart transplant    Risk Stratification: High    Referring Provider: Fredrick Wolff MD  EXERCISE  Exercise Assessment: Reassessment     Tolerated 30' of low level exercise at 2-2.6 Mets;  Including 10' warm up and cool down                           Exercise Plan  Goals Next 30 days  ADL'S: Walk 5-7 days per week    Leisure: Resume playing guitar      Education Goals: All goals in this section met    Education Goals Met: Patient can state cardiac s/s and appropriate emergency response.;Has system for taking medication.;Medication review.                          Goals Met  Initial ADL's goals met: Has not yet started a home walking program    Initial Leisure goals met: Moved back to his apartment; Independently    No data recorded  Initial Progression: Was hospitalized at the  x 10 days;  Goes to infusion daily      Exercise Prescription  Exercise Mode: Treadmill;Bike;Nustep;Arm Erg.    Frequency: 1-2 x week    Duration: 30-40'    Intensity / THR: 20-30 beats above resting heart rate    RPE 11-14  Progression / Met level: 2-2.8    Resistive Training?: No      Current Exercise (mins/week): 30      Interventions  Home Exercise:  Mode: Walk    Frequency: 1-2 x daily    Duration: 5-10'      Education Material : Educational videos;Provide written material;Individual education and counseling;Offer educational classes      Education Completed  Exercise Education Completed: Cardiac Anatomy;Signs and Symptoms;Medication review;RPE;Emergency Plan;Home Exercise;Warm up/cool down;FITT Principles;BP/HR Reponse to exercise;Benefits of Exercise;End point of exercise              Exercise Follow-up/Discharge  Follow up/Discharge: ENcouraged consistent home walking program           NUTRITION  Nutrition Assessment: Reassessment      Nutrition Risk Factors:  Nutrition Risk Factors: NA      Nutrition Plan  Other  "Nutrition Intervention: Diet Class;Educational Videos;Therapist/Pt Discussion;Provide with Written Material    Education Completed  Nutrition Education Completed: Risk factor overview;Low sodium diet      Goals  Nutrition Goals (Next 30 days): Patient will follow a low sodium diet;Patient will follow a low saturated fat diet;Patient knows appropriate portion size      Goals Met  Nutrition Goals Met: Completed Nutritional Risk Screen;Provided Rate your Plate Survey;Reviewed Dietitian schedule      Height, Weight, and  BMI  Weight: 123 lb (55.8 kg) (123lb)  Height: 5' 8\" (1.727 m)  BMI: 18.71      Nutrition Follow-up  Follow-up/Discharge: Issued diet survey       Other Risk Factors  Other Risk Factor Assessment: Reassessment      HTN Risk Factor: NA      Pre Exercise BP: 130/60  Post Exercise BP: 122/62               PSYCHOSOCIAL  Psychosocial Assessment: Reassessment       [place] COOP Q of L Summary Score: 28      ALONA-D Score: 21      Psychosocial Risk Factor: Stress      Psychosocial Plan  Interventions  Interventions: Offer Spiritual Care consult;Offer educational videos and classes;Provide written material;Individual education and counseling      Education Completed  Education Completed: Relaxation/Coping Techniques;Effects of stress on body      Goals  Goals (Next 30 days): Identify stressors;Practicing stress management skills      Goals Met  Goals Met: Identified Support system;Oriented to stress management classes      Psychosocial Follow-up  Follow-up/Discharge: Encouraged pt to attend  classes           Patient involved in Goal setting?: Yes      Signature: _____________________________________________________________    Date: __________________    Time: __________________See Doc Flowsheet  "

## 2021-05-28 NOTE — PROGRESS NOTES
PICC has good blood return after aspirating 5cc of TPA that was placed today at the [facility]. Flushed PICC with 20cc and rehooked up new bag of IV antibiotic in his ambulatory infusion and reprogrammed per MD orders

## 2021-05-28 NOTE — PROGRESS NOTES
Everton Larios arrived for Micafungin  IV infusion and Cefepime IV q 12 hr via CADD pump. Everton was released from the hospital yesterday. He reports she has past experience with home IV pumps. Lab results were reviewed. Everton Larios was educated on his plan of care. Each medication given today was reviewed prior to administration.  Micafungin 150 mg IV infusion was completed without problems. Cefepime 2 g Q 12 hr via CADD pump was initiated at 1356. PICC was placed yesterday at [facility]. Dressing clean dry and intact. IV site:PICC patent throughout treatment with positive blood return obtained before and at completion. IV site with no redness, swelling and drainage. Patient reports site  as was just placed yesterday. IV site flushed with saline and connected to CADD pump. Everton Larios has follow-up appointment scheduled daily. Everton Larios was discharged to home. He discharged from unit ambulatory with cane at 1410 with CADD pump infusing. The after visit summary was declined.     Debbie Subramanian

## 2021-05-28 NOTE — PROGRESS NOTES
Pt here today for iv abx and CADD pump dc and reconnect. He tolerated infusion well. Pump settings reviewed carefully. He will RTC tomorrow.

## 2021-05-28 NOTE — PROGRESS NOTES
Everton Larios has participated in 3 sessions of Phase II Cardiac Rehab.    Progress Report:   Cardiac Rehab Treatment Progress Report 4/23/2019 5/13/2019 5/20/2019   Weight 139 lbs 123 lbs 120 lbs   Pre Exercise  HR 98 109 108   Pre Exercise /87 130/60 132/84   Nustep Peak Heart Rate  105 109   Nustep Peak Blood Pressure  118/60 134/80   Heart Rate 98 104 106   Post Exercise /93 122/62 130/74   ECG SR/BBB ST/BBB ST   Total Exercise Minutes 6 30 30         Current Status:  Currently exercising without complaints or symptoms.    If Physician recommends change in treatment plan, please place orders.        __________________________________________________      _____________  Signature                                                                                                  Date

## 2021-05-28 NOTE — PROGRESS NOTES
Patient ambulated into Infusion Care with the use of a cane. Patient is alert and oriented and VSS. CADD pump removed and PICC line flushed with excellent blood return.Patient received Micafungin antibiotic. New batteries placed in CADD pump and settings for Cefepime reviewed with Ashlee Neal RN. Due to an emergency in the ED  the medication from pharmacy was delayed. Patint was very angry and upset with having to wait so long. Patient was given a Courtesy Meal Card for his patience in waiting.Pateint will return tomorrow and labs will be drawn before his antibiotic infusion.

## 2021-05-28 NOTE — PROGRESS NOTES
Pt arrived ambulatory to clinic for daily IV Micafungin infusion.  PICC line flushed easily with excellent blood return.  Administered IV Micafungin per MD order.  CADD pump was stopped during Micafungin infusion, no Cefepime doses were due during time of infusion.  Pt tolerated infusion well, no s/s of infusion reaction.  PICC line was flushed with NS then pt was placed back on CADD pump.  Pt will call if there are any issues with his pump.  Pt verbalized understanding of plan of care and return to clinic.

## 2021-05-28 NOTE — PROGRESS NOTES
ITP ASSESSMENT   Assessment Day: Initial    Session Number: 1  Precautions: Sternal, s/p heart transplant    Diagnosis: Heart transplant    Risk Stratification: High    Referring Provider: Fredrick Wolff MD  EXERCISE  Exercise Assessment: Initial       6 Minute Walk Test   Pre   Pre Exercise HR: 98                    Pre Exercise BP: 126/87      Peak  Peak HR: 106                   Peak BP: 148/89    Peak feet: 675    Peak O2 SAT: 100    Peak RPE: 13    Peak MPH: 1.28      Symptoms:  Peak Symptoms: None      5 mins. Post  5 Min Post HR: 98    5 Min Post BP: 141/93                           Exercise Plan  Goals Next 30 days  ADL'S: Walk daily    Leisure: Move back home independently      Education Goals: All goals in this section met    Education Goals Met: Patient can state cardiac s/s and appropriate emergency response.;Has system for taking medication.;Medication review.      Exercise Prescription  Exercise Mode: Treadmill;Nustep;Bike;Arm Erg.;Hallway Walking    Frequency: 2x/week    Duration: 40'    Intensity / THR: 20-30 beats above resting heart rate    RPE 11-14  Progression / Met level: 2-2.5    Resistive Training?: No      Current Exercise (mins/week): 1      Interventions  Home Exercise:  Mode: Walk    Frequency: 1-3x/day    Duration: 5 min      Education Material : Educational videos;Provide written material;Individual education and counseling;Offer educational classes      Education Completed  Exercise Education Completed: Cardiac Anatomy;Signs and Symptoms;Medication review;RPE;Emergency Plan;Home Exercise;Warm up/cool down;FITT Principles;BP/HR Reponse to exercise;Benefits of Exercise;End point of exercise              Exercise Follow-up/Discharge  Follow up/Discharge: Instructed to take several short walks throughout the day as tolerates   NUTRITION  Nutrition Assessment: Initial      Nutrition Risk Factors:  Nutrition Risk Factors: NA      Nutrition Plan  Interventions  Other Nutrition Intervention:  "Diet Class;Therapist/Pt Discussion;Educational Videos;Provide with Written Material      Education Completed  Nutrition Education Completed: Risk factor overview;Low sodium diet      Goals  Nutrition Goals (Next 30 days): Patient will follow a low sodium diet;Patient will follow a low saturated fat diet;Patient knows appropriate portion size      Goals Met  Nutrition Goals Met: Completed Nutritional Risk Screen;Provided Rate your Plate Survey;Reviewed Dietitian schedule      Height, Weight, and  BMI  Weight: 139 lb (63 kg)  Height: 5' 8\" (1.727 m)  BMI: 21.14      Nutrition Follow-up  Follow-up/Discharge: Issued diet survey         Other Risk Factors  Other Risk Factor Assessment: Initial      HTN Risk Factor: NA      Pre Exercise BP: 126/87  Post Exercise BP: (!) 141/93      Tobacco Risk Factor: NA     PSYCHOSOCIAL  Psychosocial Assessment: Initial       [facility] Q of L Summary Score: 28      ALONA-D Score: 21      Psychosocial Risk Factor: Stress      Psychosocial Plan  Interventions  If ALONA-D > 15 send letter to MD  Interventions: Offer Spiritual Care consult;Offer educational videos and classes;Provide written material;Individual education and counseling      Education Completed  Education Completed: Relaxation/Coping Techniques;Effects of stress on body      Goals  Goals (Next 30 days): Identify stressors;Practicing stress management skills      Goals Met  Goals Met: Identified Support system;Oriented to stress management classes      Psychosocial Follow-up  Follow-up/Discharge: Stress/emotional changes associated with heart transplant. Pt is interested in attending both stress mgmt and emotional aspects class             Patient involved in Goal setting?: Yes      Signature: _____________________________________________________________    Date: __________________    Time: __________________  "

## 2021-05-28 NOTE — PROGRESS NOTES
Pt arrived ambulatory to clinic for daily IV Micafungin and Cefepime CADD pump refill.  PICC line flushed easily with excellent blood return.  Pt forgot that he had PICC line in this morning and jump into shower without covering dressing.  Dressing was changed per protocol.  Administered IV Micafungin and refilled CADD pump per MD order.  Pt tolerated infusion well, no s/s of infusion reaction.  Pt will call if there are any issues with his pump.  Pt verbalized understanding of plan of care and return to clinic.

## 2021-05-28 NOTE — PROGRESS NOTES
Pt came into infusion clinic for his IV Antibx as ordered. Pt has PICC line with Cefepime pump running. Pump stopped and flushed with saline. Micrafungin infused over 1 hour. Line flushed with saline and Cefepime pump restarted. Pt's next Cefepime dose due at 1355. Pt tolerated infusion well. Pt left infusion clinic via ambulatory. Pt will RTC as sched.

## 2021-05-28 NOTE — TELEPHONE ENCOUNTER
Patient no longer on warfarin due to bleeding risks. He is taking ASA per records. ACM referral discontinued at this time.

## 2021-05-28 NOTE — PROGRESS NOTES
Cardiac Rehab  Phase II Assessment    Assessment Date: 19    Diagnosis: NICM   Procedure: Heart transplant  Date of Onset: 19  ICD/Pacemaker: No Explanted on 19 Parameters: NA  Post-op Complications: RV dysfunction, FRANKLIN on CKD requiring HD, hematoma at explanted ICD site requiring evacuation/drainage on 3/21/19, HCAP, delirium, encephalopathy, anemia  ECG History: SR, Afib, Vtach   EF%:60-65 per echo on 3/26/19  Past Medical History:   Past Medical History:   Diagnosis Date     Arthritis     hands, neck     ASD (atrial septal defect)     s/p repair age 3     Asthma      Atrial fibrillation (H)      Pierce's esophagus      Cataract      CHF (congestive heart failure) (H)      Clotting disorder (H)      Congenital cardiomyopathy in  (H)      Depression      DJD (degenerative joint disease)     neck     History of anesthesia complications     nausea     History of transfusion      Hypothyroidism      ICD (implantable cardioverter-defibrillator) in place      Pacemaker      Ulcerative colitis (H)      Ventricular tachycardia (H)      Patient Active Problem List   Diagnosis     Osteopenia     Hypothyroidism     Non-ischemic cardiomyopathy (H)     Pierce's Esophagus     Ulcerative Colitis     Paroxysmal Atrial Fibrillation     Atrial Flutter     Ventricular Tachycardia     ASD (atrial septal defect)     Primary osteoarthritis involving multiple joints     ICD (implantable cardioverter-defibrillator), biventricular, in situ     Chronic seasonal allergic rhinitis     Acute on chronic systolic congestive heart failure (H)     Community acquired pneumonia, unspecified laterality     Mild persistent asthma without complication     Dysthymia     Systolic CHF, chronic (H)     Medication monitoring encounter     Heart replaced by transplant (H)         Physical Assessment  Precautions/ Physical Limitations: S/p heart transplant, Sternal, T1 compression fracture  Oxygen: No  O2 Sats: 100  Lung Sounds:  clear  Edema: trace bilateral l/e. Usually wears compression stockings  Incisions: sternal incision appears well healed, ICD explant site CDI  Sleeping Pattern: good   Appetite: good   Nutrition Risk Screen: No risk at this time - states he has seen dieticians at [facility]    Pain  Location: Back (denies surgical/sternal pain)  Characteristics:ache  Intensity: (0-10 scale) 5 - Intermittent  Current Pain Management: Oxycodone and tylenol      Psychosocial/ Emotional Health  1. In the past 12 months, have you been in a relationship where you have been abused physically, emotionally, sexually or financially? No  notified: NA  2. Who do you turn to for emotional support?: family  3. Do you have cultural or spiritual needs? No  4. Have there been any major life changes in the past 12 months? Yes  Heart transplant      Referral Information  Primary Physician: Fredrick Wolff MD  Cardiologist: Corinna Donis  Surgeon: Dewayne House/Ben White    Home exercise/Equipment: No    Patient's long-term goal(s): Return home to independent living, RTW PT, discontinue use of cane     1. Living Accommodations: Home Steps: Yes      Support people at home: Living with parents for another month, then to move home alone   2. Marital Status: single  3. Family is able to assist with cares      Yazidism/Community involvement: Yes  4. Recreation/Hobbies: Playing [employer]

## 2021-05-28 NOTE — TELEPHONE ENCOUNTER
Message given to infusion center. They requested a copy of Dr. Wolff's note so I faxed it back to them.  Hiral Wilcox CMA ............... 1:31 PM, 05/10/19

## 2021-05-28 NOTE — PROGRESS NOTES
Patient ambulated into Infusion Care with the use of a cane. Patient is alert and oriented and VSS. CADD pump put on hold and disconnected. PICC line flushed with Normal Saline and Micafungin antibiotic mixed and administered into PICC line. PICC line flushed with Normal  Saline and CADDpump reconnected with Cefepime. Patient stated he would be going to the [facility] tomorrow and would have his weekly labs drawn there this week. He also has an appointment there next Wednesday on 5/22/2019 and will have his labs drawn then too. Unsure if we would be drawing them here after 5/22/2019. Reviewed after hours call information with patient. All questions answered and patient was discharged home.

## 2021-05-28 NOTE — PROGRESS NOTES
Patient ambulated into infusion Care with the use of a cane. Patient is alert and oriented and VSS. CADD pump disconnected and PICC line flushed with excellent blood return. Labs drawn and PICC line flushed with Normal Saline.Patient received Micafungin antibiotic infusion. PICC line flushed with Normal Saline. New batteries placed in CADD pump and Cefepime antibiotic settings reviewed with Sheila Alejo RN. Patient will return tomorrow for another antibiotic infusion.

## 2021-05-29 ENCOUNTER — RECORDS - HEALTHEAST (OUTPATIENT)
Dept: ADMINISTRATIVE | Facility: CLINIC | Age: 56
End: 2021-05-29

## 2021-05-29 NOTE — PROGRESS NOTES
"Pt came into infusion clinic for his IV antibx as ordered. Pt is not due for pump DC today. Pt is upset that his apt's have been taking so long. Pt spoke with our clinical manager for about 20 min before his apt today. No blood return from PICC line today. Pt states, it sometimes gives blood and some times doesn't. He says it \"depends on the nurse\". Pt's nurse yesterday notified pt's [facility] physician of this. Pt has an apt at 1430 today and plans to speak with his doctor about his PICC line. Pt will also report his itching and bruising. Pt's cont antibx pump stopped and flushed with saline. Micafungin infused. Line flushed saline and antibx pump re-started. Pt left infusion via ambulatory. Pt will RTC 5/25 to [facility] infusion. Pt is aware of this.   "

## 2021-05-29 NOTE — PROGRESS NOTES
Pt arrived ambulatory to clinic for daily IV Micafungin infusion.  PICC line flushed easily with excellent blood return.  Administered IV Micafungin per MD order.  Pt tolerated infusion well, no s/s of infusion reaction.  PICC line was flushed with NS then capped for tomorrows use.   Pt verbalized understanding of plan of care and return to clinic.     Pt was supposed to have Tacrolimus level drawn today, but forgot to bring kit that [facility] requires it to be drawn in.  Pt will bring kit tomorrow so it can be drawn.

## 2021-05-29 NOTE — PROGRESS NOTES
[facility] clinic Follow Up Note    Everton Larios   54 y.o. male    Date of Visit: 5/29/2019    Chief Complaint   Patient presents with     Follow-up     Routine checkup     Subjective  Everton is here for follow-up after an extended hospital course for severe Pseudomonas pneumonia that he was hospitalized to the [facility] with the transplant team.    He status post cardiac transplant for nonischemic cardiomyopathy.  Transplant occurred in February.    He has had mild rejection.  I did review these heart biopsy from May 22 which showed focal mild rejection.    He has a history of V. tach and long QTC.    He still on Cymbalta 20 mg a day for chronic dysthymia and anxiety.  That is adequately controlled.  PHQ 9 score today was 6.    He was treated with cefepime for 1 month and micafungin.  He still has a PICC line in the right arm.  That is not giving him an issue and no arm edema or erythema.  He just had a dressing change today.    I reviewed the infectious disease note from yesterday.  He is just done with his antibiotics now.  Plan to follow-up with infectious disease in 1 to 2 months.    He was hospitalized briefly for some volume depletion earlier this month with a creatinine of 3.3 on May 26.    Labs yesterday were reviewed with a creatinine back to 2.7.  Potassium 3.5 with sodium normal.  Normal liver test.    Hemoglobin was down to 8 on May 1 with low blood counts including neutropenia.    Labs yesterday showed a white count of 8.7 with a hemoglobin of 9.2.    He had some moderate dyspnea on exertion, but improving since getting out of the hospital.    No palpitations.    No new joint pains or recurrent fever.  No rash.    He had a previous Klebsiella pneumonia just after the transplant in March.    He does have a past history of mild intermittent asthma but no flare.    Quit smoking in 2008.    He is on pentamadine for PCP prophylaxis currently instead of the Bactrim.    Bumex twice a day  without significant edema.  Immunosuppression managed by his transplant team.    He did have significant renal injury after transplant.  Baseline creatinine was 1.3 on May 1.    Past history of ulcerative colitis but no flare.  Still on mesalamine twice a day.  He is mildly constipated now using some stool softeners.  He has used MiraLAX but not currently using.  No blood in stool or abdominal pain complaints now.    Past history of hypothyroidism with normal TSH in March of this year.    No swallowing difficulty or mouth sores.  No vision changes or eye pain.  No urinary complaints or hematuria.    PMHx:    Past Medical History:   Diagnosis Date     Arthritis     hands, neck     ASD (atrial septal defect)     s/p repair age 3     Asthma      Atrial fibrillation (H)      Pierce's esophagus      Cataract      CHF (congestive heart failure) (H)      Clotting disorder (H)      Congenital cardiomyopathy in  (H)      Depression      DJD (degenerative joint disease)     neck     History of anesthesia complications     nausea     History of transfusion      Hypothyroidism      ICD (implantable cardioverter-defibrillator) in place      Pacemaker      Ulcerative colitis (H)      Ventricular tachycardia (H)      PSHx:    Past Surgical History:   Procedure Laterality Date     ABLATION OF DYSRHYTHMIC FOCUS      for A fib     ASD REPAIR  1968     CARDIAC DEFIBRILLATOR PLACEMENT      x2--last was 2018     COLONOSCOPY N/A 2015    Procedure: COLONOSCOPY with biopsy;  Surgeon: Fernie Akers MD;  Location: [facility];  Service:      COLONOSCOPY N/A 2017    Procedure: COLONOSCOPY with biopsies and polypectomies using snare;  Surgeon: Gael Romero MD;  Location: [facility];  Service:      ESOPHAGOGASTRODUODENOSCOPY N/A 2017    Procedure: ESOPHAGOGASTRODUODENOSCOPY (EGD) with biopsies;  Surgeon: Gael Romero MD;  Location: [facility];  Service:      HAND SURGERY Left      LIPOMA  RESECTION      x2     [place] REVISE MEDIAN N/CARPAL TUNNEL SURG  9/21/2016    Procedure: OPEN RIGHT CARPAL TUNNEL RELEASE CORTISONE INJECTION RIGHT THUMB;  Surgeon: Duong Santoyo MD;  Location: [facility];  Service: Orthopedics     Immunizations:   Immunization History   Administered Date(s) Administered     Influenza, inj, historic,unspecified 10/22/2008, 09/25/2009, 11/07/2011     Influenza, seasonal,quad inj 36+ mos 09/12/2016, 10/16/2017, 09/20/2018     Influenza, seasonal,quad inj 6-35 mos 11/30/2012, 10/02/2014     Influenza,seasonal quad, PF, 36+MOS 10/02/2015     Tdap 07/20/2012       ROS A comprehensive review of systems was performed and was otherwise negative    Medications, allergies, and problem list were reviewed and updated    Exam  BP (!) 138/92 (Patient Site: Right Arm, Patient Position: Sitting, Cuff Size: Adult Regular)   Pulse (!) 108   Resp 16   Wt 133 lb 1.6 oz (60.4 kg)   SpO2 100%   BMI 20.54 kg/m    Lungs are clear without wheezing or crackles.  Mildly reduced respiratory excursion.  Heart is regular with tachycardia with heart rate around 100.  No murmur rub or gallop.  Heart exam unchanged.  Abdomen soft and nontender.  No ankle edema.  Able to clamp on the exam table.  Alert and oriented x3, no neurologic changes.    Patient is quite thin, some bruising on forearms and hands    Assessment/Plan  1. History of Pseudomonas pneumonia  Recovered.  Infectious disease evaluation yesterday reviewed.  Follow-up with infectious disease in 1 to 2 months.  Continue PCP prophylaxis per infectious disease and transplant team.    I stressed the importance to patient of seeking medical attention immediately at the [facility] if he feels he has recurrent respiratory or infectious symptoms.    He does have a plan to remove the PICC line soon.    Reviewed the chest CT scan from May 22, 2019.  There is decreased bilateral nodule and consolidative opacities compared to April 2019 CT scan.   There is a subacute T12 vertebral body compression deformity.  No effusion or fluid collection noted.    2. Mild intermittent asthma without complication  No flare    3. Heart replaced by transplant (H)  Managed by his transplant team.  Immunosuppression per transplant team.    I reviewed the heart echo from May 25, 2019 with LVH noted.  Ejection fraction 40 to 45%.  Moderate mitral insufficiency.  Moderate pulmonary hypertension with dilated IVC.  Mitral regurgitation is somewhat more prominent compared to previous echo.    4. Ulcerative colitis without complications, unspecified location (H)  No flare.    5. Hypothyroidism, unspecified type  Normal TSH in March.  Continue current dose Synthroid.  I would have him recheck the TSH later this summer or fall.  I would encourage his transplant team to do that when they do blood work.    Avoiding blood draws here in this clinic given his frequent blood draws and immunosuppression.    6. History of ventricular tachycardia  No recurrence after transplant.  There was a long QTC mentioned in the notes and fluoroquinolones were avoided this last hospitalization.  He did have EKGs during hospitalization, unable to view through current computer, unfortunately.  That will be managed through his heart transplant team.    7. Dysthymia  Well-controlled with current Cymbalta 20 mg.  Continue current dose.    Patient will be continue to manage through his transplant team and hospitalizations at [facility] because of his transplant condition.    He can follow-up with me in the fall, but at this point I am not managing his major issues.    Return in about 4 months (around 9/29/2019) for Recheck.   Patient Instructions   No change in treatment plan.    Continue to manage through your transplant team.    You can make a routine follow-up appointment in 4 months to see me for a checkup in the fall, but if your needs are being addressed through the transplant team, you can adjust  that appointment.    Consider MiraLAX for constipation if needed.    Fredrick Wolff MD      Current Outpatient Medications   Medication Sig Dispense Refill     acetaminophen (TYLENOL) 325 MG tablet Take 650 mg by mouth every 4 (four) hours as needed for pain.              albuterol (PROAIR HFA;PROVENTIL HFA;VENTOLIN HFA) 90 mcg/actuation inhaler Inhale 2 puffs.       aspirin 81 mg chewable tablet Chew 81 mg daily.       azelastine (ASTELIN) 137 mcg (0.1 %) nasal spray 2 sprays into each nostril 2 (two) times a day. Use in each nostril as directed 30 mL 5     balsalazide (COLAZAL) 750 mg capsule 2 capsules twice daily  5     calcium carbonate-vit D3-min 600 mg calcium- 400 unit Tab Take 1 tablet by mouth 2 (two) times a day with meals.              calcium, as carbonate, (TUMS) 200 mg calcium (500 mg) chewable tablet Chew 1 tablet daily as needed for heartburn.       cefepime HCl (CEFEPIME IV) Infuse 2 g into a venous catheter every 12 (twelve) hours. Via cadd pump       DULoxetine (CYMBALTA) 20 MG capsule Take 1 capsule by mouth daily .             fluticasone (FLONASE) 50 mcg/actuation nasal spray Apply 1 spray into each nostril 2 (two) times a day.              fluticasone propionate (FLOVENT HFA) 220 mcg/actuation inhaler Inhale 2 puffs 2 (two) times a day.       KLOR-CON M20 20 mEq tablet TAKE 1 TABLET(20 MEQ) BY MOUTH DAILY (Patient taking differently: 1 tablet daily.) 90 tablet 1     levothyroxine (SYNTHROID, LEVOTHROID) 100 MCG tablet Take 1 tablet (100 mcg total) by mouth daily. 30 tablet 3     magnesium oxide (MAG-OX) 400 mg (241.3 mg magnesium) tablet Take 400 mg by mouth 2 (two) times a day.              melatonin 3 mg Tab tablet Take 6 mg by mouth at bedtime as needed.       micafungin sodium (MICAFUNGIN IV) Infuse 150 mg into a venous catheter daily.       montelukast (SINGULAIR) 10 mg tablet TAKE 1 TABLET BY MOUTH AT BEDTIME 90 tablet 3     multivitamin with minerals (THERA M PLUS, FERROUS FUMARAT,) 9  mg iron-400 mcg Tab tablet Take 1 tablet by mouth daily.       mycophenolate (CELLCEPT) 250 mg capsule Take 1,500 mg by mouth 2 (two) times a day.              nystatin (MYCOSTATIN) 100,000 unit/mL suspension Take 500,000 Units by mouth 4 (four) times a day.       omeprazole (PRILOSEC) 40 MG capsule Take 40 mg by mouth every evening.       predniSONE (DELTASONE) 5 MG tablet Take 5 mg by mouth daily Take 5 mg by mouth every evening Take 10 mg (2 tablets) every am, and 5 mg (1 tablet) every evening.       rosuvastatin (CRESTOR) 10 MG tablet Take 10 mg by mouth daily.       senna-docusate (PERICOLACE) 8.6-50 mg tablet Take 2 tablets by mouth 2 (two) times a day as needed.              tacrolimus (PROGRAF) 1 MG capsule Take by mouth. Take 2 tablets in the morning and 3 mg in the evening.             VENTOLIN HFA 90 mcg/actuation inhaler TAKE 2 PUFFS BY MOUTH EVERY 6 HOURS AS NEEDED FOR WHEEZE 18 Inhaler 0     alpha-D-galactosidase (BEANO ORAL) Take 2 tablets by mouth 3 (three) times a day.       bumetanide (BUMEX) 2 MG tablet Take 2 mg by mouth 2 (two) times a day.       polyethylene glycol (MIRALAX) 17 gram packet Take 17 g by mouth as needed.       No current facility-administered medications for this visit.      Facility-Administered Medications Ordered in Other Visits   Medication Dose Route Frequency Provider Last Rate Last Dose     alteplase injection 1 mg (CATHFLO; TPA)  1 mg Intracatheter Once System, Provider Not In         Allergies   Allergen Reactions     Adhesive Tape-Silicones      Other reaction(s): Blisters  Tegaderm causes bruises, blisters and extreme irritation.     Hydromorphone Other (See Comments)     Significant Delirium     Lisinopril Other (See Comments)     hypotension     Codeine Nausea Only     nausea     Social History     Tobacco Use     Smoking status: Former Smoker     Packs/day: 1.00     Years: 25.00     Pack years: 25.00     Last attempt to quit: 11/6/2008     Years since quitting: 10.5      Smokeless tobacco: Never Used   Substance Use Topics     Alcohol use: Yes     Alcohol/week: 3.6 oz     Types: 6 Standard drinks or equivalent per week     Comment: 2 drinks 3 times weekly when out with friends     Drug use: No

## 2021-05-29 NOTE — PROGRESS NOTES
"Pt arrived ambulatory and was seated in chair 5. Reviewed plan of care and today's treatment - \"the cefepime is changed to daily, so no pump.\" Discussed  Plan of care with Cele Trejo. PICC line in the right upper arm is clean and intact. Removed the cap and connected the PICC to a NS 10 ml syringe - flushed x 3 easily, with no blood return. \"They put tpa in it when I was in the hospital.\" Administered micafungin 100 mg over 1 hour (using NS as the primary IV solution). Flushed with NS between meds and gave cefepime 2 gms IVP over 5 minutes. Discussed potential side effects related to each medication. At completion the PICC line was flushed easily with NS 10 mls x 3, and still no blood return. \"I have to be at the [facility] for labs at 14:15 - Dr. Shukla, infectious disease.\"  PICC line was capped with a swabcap at the end, and site secured with a fishnet sleeve; connection taped with paper tape. Left unit ambulatory in stable condition at 12:34, and plans to go to [facility] tomorrow (\"and I have an appointment with the internist, about 17 doctor visits per week.\")    Xiomara Jacob RN  "

## 2021-05-29 NOTE — PROGRESS NOTES
Pt is here for his IV antibiotics. [ts Cefepime was discontinued, sppt just got the Micafungin over 1 hr IV infusion. Right brachial PICC line had good blood return and flushed easily. Dressing dry and intact. Pt is back tommorow for infusion. Left ambulatory.

## 2021-05-29 NOTE — PROGRESS NOTES
Pt came into infusion clinic for his IV Antibx as ordered. Send out lab drawn peripherally as PICC would not given enough blood return. Medications explained to pt who verbalized understanding. PICC patent throughout infusion. Pt tolerated infusion with no complications. Pt left infusion clinic via ambulatory and will RTC as sched,

## 2021-05-29 NOTE — PATIENT INSTRUCTIONS - HE
No change in treatment plan.    Continue to manage through your transplant team.    You can make a routine follow-up appointment in 4 months to see me for a checkup in the fall, but if your needs are being addressed through the transplant team, you can adjust that appointment.    Consider MiraLAX for constipation if needed.

## 2021-05-29 NOTE — PROGRESS NOTES
"Pt admitted to the hospital yesterday stating that he was admitted for \"kidney injury\"  Also stating that his kidney function labs were elevated and he would receive a different dosage of his cefipime.  "

## 2021-05-29 NOTE — PROGRESS NOTES
"Everton Larios arrived for Cefepime pump refill and Micafungin infusion. Everton will be having labs done tomorrow at the [facility]. Everton Larios was educated on his plan of care. Each medication given today was reviewed prior to administration. Micafungin 100 mg infusion treatment was completed and Cefepime pump refilled. Doses Cefepime 2 grams q 12 hrs. Delayed start to 1630. IV site:PICC patent throughout treatment with no positive blood return obtained at time of disconnect and reconnect though it flushes with ease. Everton stated nurse got a blood return yesterday. He declined to stay to have PICC evaluated. He said \"they can take care of it tomorrow when I go to the [facility], He reported they had to do TPA last week. IV site with no pain, redness, swelling and drainage. IV site flushed with saline and connected to CADD pump. Evetron Larios has follow-up appointment scheduled. Everton Larios was discharged to home. He discharged from unit ambulatory with cane at 1259 . The after visit summary was declined.     Debbie Subramanian    "

## 2021-05-29 NOTE — PROGRESS NOTES
"Patient arrived ambulatory and was seated in chair 4. Reviewed plan of care - \"yes, on Monday the PICC line is to be removed - however, if I am at the [facility], they will remove it - I will let you know.\" Slight shortness of breath with exertion. HR after sitting in the chair for approximately 20 minutes is 108, afebrile, bp 131/94 - wears a face mask, and has a slight cough. Denies pain. Excellent blood return from the PICC line - used NS as the primary IV solution, and infused micafungin 100 mg over 1 hour and then flushed the IV line with NS 50 mls. At completion the PICC line was secured with a fishnet sleeve. Rested in the recliner throughout the infusion. Left unit ambulatory in stable condition at 11:58, and plans to go to [facility] on Saturday & Sunday.    Xiomara Jacob RN  "

## 2021-05-29 NOTE — PROGRESS NOTES
"Patient arrived ambulatory and was seated in chair 2. Reviewed plan of care. Excellent blood return from the PICC line - used NS as the primary IV solution, and infused micafungin 100 mg over 1 hour. The IV line was flushed with NS - in addition, flushed with NS 10 mls x 2 and obtained an excellent blood return on completion.     Planned to remove the PICC line - removed the dressing, and a suture under the skin was noted at the insertion site. Site care was done per sterile technique and PICC line site was wrapped. \"I go to the [facility] tomorrow - having all kinds of things done.\" Left message for Jocelynn at the [facility] - and informed of situation.     Left unit ambulatory in stable condition at 11:30, and plans to go to cardiac rehab after this infusion visit.    Xiomara Jacob RN  "

## 2021-05-29 NOTE — PROGRESS NOTES
Pt presented for iv antibiotics today. Pt picc did not have blood return yesterday at [facility] but did have good blood return here today. Pt states he had labs done at the [facility]  yesterday. Today he has a visit with Dr Wolff after his infusion. Pt said he also saw ID of the [facility]day. Dr Shukla [phone]. Pt thought his cefepime was discontinued. Our orders have both the Micafungin and Cefepime daily until 6/4. I called and left a message at Dr Shukla office to clarify the duration of the medications along with our phone and fax numbers. Pt tolerated iv antibiotics well . picc drsg changed and pt discharged to go to Dr Castelan office next. Shirley Nash RN

## 2021-05-29 NOTE — PROGRESS NOTES
"Everton Larios arrived for IV Micafungin and Cefepime pump refill. Lab results from [facility] yesterday were reviewed. Everton Larios was educated on  his plan of care. He voiced his frustration angrily that it takes so long for him to complete his appointment here at [facility]. He was also voicing his frustration with me as I could not get a blood return from his PICC line again today (same as Tuesday). \" They got a blood return yesterday (at the [facility]). \"It doesn't take this long at [facility]. My appointments are supposed to be an hour and a half.\" I did offer to make other arrangement for his appointments. He declined as we are closest to his home. Each medication given today was reviewed prior to administration. Micafungin infusion was completed and Cefepime Cadd pump refill completed. He has no signs of reaction. IV site:PICC line was flushed with NS 20 ml (each) at time of pump disconnect, prior to and after Micofungin infusion and prior to pump reconnect. I was not able to get a blood return from the PICC line today I attempted to get patient is same position he thought he was in yesterday at the [facility] when they were able to get a blood return. The PICC line flushes with ease with no resistance. IV site with no pain, redness, swelling and drainage. PICC connected to Cadd pump . Pump set for delayed start to dose at 1630. Everton Larios has follow-up appointment scheduled tomorrow for Micafungin ( pump refill due on Saturday.. Everton Larios was discharged to home. He discharged from unit at 1334. I called the [facility] at 1425 and was able to leave a message with Latonia. She is going to get a message to Everton's care team re the no blood return from PICC line concern (no blood return though it flushes with ease). I then received a return call from Jocelynn from [facility] transplant clinic. She is going to inform  about my PICC line concern. She or another team member will return call with any decision from . " Vazquez. Is noted that Everton Larios has an appointment with Dr. Shukla tomorrow afternoon at the [facility] . As of 1647 I have not received a return call from the U of  re no blood return from PICC line.    Debbie Subramanian

## 2021-05-29 NOTE — PROGRESS NOTES
ITP ASSESSMENT   Assessment Day: 60 Day    Session Number: 4  Precautions: Sternal Precautions;  S/P Heart Transplant    Diagnosis: Heart transplant    Risk Stratification: High    Referring Provider: Fredrick Wolff MD  EXERCISE  Exercise Assessment: Reassessment       Tolerates 35' of low level exercise                           Exercise Plan  Goals Next 30 days  ADL'S: Walk 3-5 days per week    Leisure: Play Guitar;  Walk regularly    Education Goals: All goals in this section met    Education Goals Met: Patient can state cardiac s/s and appropriate emergency response.;Has system for taking medication.;Medication review.                          Goals Met  30 day ADL'S goals met: Has not started a home walking Program yet    30 day Leisure goals met: Has not resumed playing guitar    30 Day Progression: Was hospitalized recently 5/24=5/26/19 With Dehydration      Initial ADL's goals met: Has not yet started a home walking program    Initial Leisure goals met: Moved back to his apartment; Independently    Initial Progression: Was hospitalized at the  x 10 days;  Goes to infusion daily      Exercise Prescription  Exercise Mode: Bike;Nustep;Arm Erg.    Frequency: 1- 2 x week    Duration: 30-40'    Intensity / THR: 20-30 beats above resting heart rate    RPE 11-14  Progression / Met level: 2-2.8    Resistive Training?: No      Current Exercise (mins/week): 30      Interventions  Home Exercise:  Mode: Walk    Frequency: 3-5 days per week    Duration: 5-10'      Education Material : Educational videos;Provide written material;Individual education and counseling;Offer educational classes      Education Completed  Exercise Education Completed: Cardiac Anatomy;Signs and Symptoms;Medication review;RPE;Emergency Plan;Home Exercise;Warm up/cool down;FITT Principles;BP/HR Reponse to exercise;Benefits of Exercise;End point of exercise              Exercise Follow-up/Discharge  Follow up/Discharge: Reviewed importance of  "consistent home exercise program     NUTRITION  Nutrition Assessment: Reassessment      Nutrition Risk Factors:  Nutrition Risk Factors: NA      Nutrition Plan  Interventions  Other Nutrition Intervention: Diet Class;Therapist/Pt Discussion;Educational Videos;Provide with Written Material      Education Completed  Nutrition Education Completed: Risk factor overview;Low sodium diet      Goals  Nutrition Goals (Next 30 days): Patient will follow a low sodium diet;Patient will follow a low saturated fat diet;Patient knows appropriate portion size      Goals Met  Nutrition Goals Met: Completed Nutritional Risk Screen;Provided Rate your Plate Survey;Reviewed Dietitian schedule      Height, Weight, and  BMI  Weight: 123 lb (55.8 kg)  Height: 5' 8\" (1.727 m)  BMI: 18.71      Nutrition Follow-up  Follow-up/Discharge: Re-issued pt Diet Habit Survey         Other Risk Factors  Other Risk Factor Assessment: Reassessment      HTN Risk Factor: NA      Pre Exercise BP: (!) 136/91  Post Exercise BP: 130/74          Tobacco Risk Factor: NA       PSYCHOSOCIAL  Psychosocial Assessment: Reassessment         Psychosocial Risk Factor: Stress      Psychosocial Plan  Interventions  Interventions: Offer Spiritual Care consult;Offer educational videos and classes;Provide written material;Individual education and counseling      Education Completed  Education Completed: Relaxation/Coping Techniques;Effects of stress on body      Goals  Goals (Next 30 days): Identify stressors;Practicing stress management skills      Goals Met  Goals Met: Identified Support system;Oriented to stress management classes      Psychosocial Follow-up  Follow-up/Discharge: Issued pt a class schedule              Patient involved in Goal setting?: Yes      Signature: _____________________________________________________________    Date: __________________    Time: _________________  "

## 2021-05-30 VITALS — BODY MASS INDEX: 28.19 KG/M2 | HEIGHT: 68 IN | WEIGHT: 186 LBS

## 2021-05-30 VITALS — WEIGHT: 182 LBS | BODY MASS INDEX: 27.67 KG/M2

## 2021-05-30 VITALS — WEIGHT: 184.2 LBS | BODY MASS INDEX: 27.92 KG/M2 | HEIGHT: 68 IN

## 2021-05-30 VITALS — HEIGHT: 68 IN | WEIGHT: 185 LBS | BODY MASS INDEX: 28.04 KG/M2

## 2021-05-30 VITALS — HEIGHT: 68 IN | WEIGHT: 182 LBS | BODY MASS INDEX: 27.58 KG/M2

## 2021-05-30 NOTE — PROGRESS NOTES
ITP ASSESSMENT   Assessment Day: 90 Day    Session Number: 10  Precautions: Sternal, S/P Heart Transplant    Diagnosis: Heart transplant    Risk Stratification: High    Referring Provider: Fredrick Wolff MD  EXERCISE  Exercise Assessment: Reassessment  Pt is tolerating 40 minutes of exercise in cardiac rehab 2 times each week at MET level of 2-2.5.                         Exercise Plan  Goals Next 30 days  ADL'S: Walk 4-5 days per week at home.    Leisure: Build endurance to go to [place] [employer] [place] for an event with friends in mid July.     Work: Tolerated PT work as  at [employer]       Education Goals: All goals in this section met    Education Goals Met: Patient can state cardiac s/s and appropriate emergency response.;Has system for taking medication.;Medication review.                          Goals Met  60 day ADL'S goals met: Goal met- Pt is walking 2-3 days per week at home    60 day Leisure goals met: Goal met- Pt is back to playing guKwanjir.    60 Day Progression: Pt is making progress. Set new goals for next 30 days.       30 day ADL'S goals met: Has not started a home walking Program yet    30 day Leisure goals met: Has not resumed playing guitar  30 Day Progression: Was hospitalized recently 5/24=5/26/19 With Dehydration      Initial ADL's goals met: Has not yet started a home walking program    Initial Leisure goals met: Moved back to his apartment; Independently    Initial Progression: Was hospitalized at the  x 10 days;  Goes to infusion daily      Exercise Prescription  Exercise Mode: Bike;Nustep;Arm Erg.    Frequency: 2x/week    Duration: 40 minutes    Intensity / THR: 20-30 beats above resting heart rate    RPE 11-14  Progression / Met level: 2.5-3    Resistive Training?: No      Current Exercise (mins/week): 80      Interventions  Home Exercise:  Mode: Walk     Frequency: 4-5 days per week    Duration: 15-20 minutes      Education Material : Educational videos;Provide written  "material;Individual education and counseling;Offer educational classes      Education Completed  Exercise Education Completed: Cardiac Anatomy;Signs and Symptoms;Medication review;RPE;Emergency Plan;Home Exercise;Warm up/cool down;FITT Principles;BP/HR Reponse to exercise;Benefits of Exercise;End point of exercise              Exercise Follow-up/Discharge  Follow up/Discharge: Encouraged pt to start walking more days at home.    NUTRITION  Nutrition Assessment: Reassessment      Nutrition Risk Factors:  Nutrition Risk Factors: NA      Nutrition Plan  Interventions  Other Nutrition Intervention: Diet Class;Therapist/Pt Discussion;Educational Videos;Provide with Written Material    Education Completed  Nutrition Education Completed: Risk factor overview;Low sodium diet      Goals  Nutrition Goals (Next 30 days): Patient will follow a low sodium diet;Patient will follow a low saturated fat diet;Patient knows appropriate portion size      Goals Met  Nutrition Goals Met: Completed Nutritional Risk Screen;Provided Rate your Plate Survey;Reviewed Dietitian schedule      Height, Weight, and  BMI  Weight: 122 lb (55.3 kg)  Height: 5' 8\" (1.727 m)  BMI: 18.55      Nutrition Follow-up  Follow-up/Discharge: Pt reports its hard to afford some foods right now, so working on ways to eat healthy on a budget.           PSYCHOSOCIAL  Psychosocial Assessment: Reassessment       [facility] ROBBIE Q of L Summary Score: 28    Psychosocial Risk Factor: Stress      Psychosocial Plan  Interventions  Interventions: Offer Spiritual Care consult;Offer educational videos and classes;Provide written material;Individual education and counseling      Education Completed  Education Completed: Relaxation/Coping Techniques;Effects of stress on body      Goals  Goals (Next 30 days): Identify stressors;Practicing stress management skills      Goals Met  Goals Met: Identified Support system;Oriented to stress management classes      Psychosocial " Follow-up  Follow-up/Discharge: Pt reports lots of stress with bills. Sent home education class schedule and encouraged him to attend stress managment class.              Patient involved in Goal setting?: Yes      Signature: _____________________________________________________________    Date: __________________    Time: __________________See Doc Flowsheet

## 2021-05-30 NOTE — TELEPHONE ENCOUNTER
RN cannot approve Refill Request    RN can NOT refill this medication med is not covered by policy/route to provider. Last office visit: 5/29/2019 Fredrick Wolff MD Last Physical: 9/12/2016 Last MTM visit: Visit date not found Last visit same specialty: 5/29/2019 Fredrick Wolff MD.  Next visit within 3 mo: Visit date not found  Next physical within 3 mo: Visit date not found      Jory Palomino, [facility] Triage/Med Refill 7/6/2019    Requested Prescriptions   Pending Prescriptions Disp Refills     montelukast (SINGULAIR) 10 mg tablet [Pharmacy Med Name: MONTELUKAST SOD 10 MG TABLET] 90 tablet 2     Sig: TAKE 1 TABLET BY MOUTH AT BEDTIME       There is no refill protocol information for this order

## 2021-05-30 NOTE — PROGRESS NOTES
Everton Larios has participated in 14 sessions of Phase II Cardiac Rehab.    Progress Report:   Cardiac Rehab Treatment Progress Report 6/27/2019 7/1/2019 7/8/2019 7/11/2019 7/15/2019   Weight 122 lbs 123 lbs 129 lbs 123 lbs 125 lbs   Pre Exercise   110 112 113 115   Pre Exercise /68 118/76 104/64 118/70 94/60   Nustep Peak Heart Rate 116 114 113 117 -   Nustep Peak Blood Pressure 130/70 128/80 150/76 116/60 -   Heart Rate 112 111 110 114 112   Post Exercise /78 108/76 118/72 118/72 116/70   ECG ST ST ST ST ST   Total Exercise Minutes 40 45 35 40 40         Current Status:  Tolerating cardiac rehab well.      If Physician recommends change in treatment plan, please place orders.        __________________________________________________      _____________  Signature                                                                                                  DateSee Doc Flowsheet

## 2021-05-30 NOTE — PROGRESS NOTES
ITP ASSESSMENT   Assessment Day: 120 Day    Session Number: 16  Precautions: S/P heart transplant    Diagnosis: Heart transplant    Risk Stratification: High    Referring Provider: Fredrick Wolff MD  EXERCISE  Exercise Assessment: Reassessment       Pt tolerates 2.5 to 2.8 METs of exercise in rehab. Pt is limited by back discomfort. Has resumed PT work, feels he is slowly resuming back to baseline                         Exercise Plan  Goals Next 30 days  ADL'S: Walk 4-5 days/week for 20 min or more    Leisure: Attend rehab 2x/week    Work: Continue to tolerate PT work      Education Goals: All goals in this section met    Education Goals Met: Patient can state cardiac s/s and appropriate emergency response.;Has system for taking medication.;Medication review.                          Goals Met  90 day ADL'S goals met: Pt is walking 3-5 x/week - goal in progress    90 day Leisure goals met: Pt attended event at [facility] [employer] - goal met    90 day Work goals met: Pt has resumed PT work - goal met    90 Day Progress: Progressing well, reports his main limitation at this time is back pain      60 day ADL'S goals met: Goal met- Pt is walking 2-3 days per week at home    60 day Leisure goals met: Goal met- Pt is back to playing [employer].    60 Day Progression: Pt is making progress. Set new goals for next 30 days.       30 day ADL'S goals met: Has not started a home walking Program yet    30 day Leisure goals met: Has not resumed playing guitar    30 Day Progression: Was hospitalized recently 5/24=5/26/19 With Dehydration      Initial ADL's goals met: Has not yet started a home walking program    Initial Leisure goals met: Moved back to his apartment; Independently    Initial Progression: Was hospitalized at the  x 10 days;  Goes to infusion daily      Exercise Prescription  Exercise Mode: Bike;Nustep;Arm Erg.    Frequency: 2x/week    Duration: 40'    Intensity / THR: 20-30 beats above resting heart rate    RPE  "11-14  Progression / Met level: 2.8-3.2    Resistive Training?: Yes      Current Exercise (mins/week): 120      Interventions  Home Exercise:  Mode: Walk    Frequency: 4-5x/week    Duration: 20-30 minutes      Education Material : Educational videos;Provide written material;Individual education and counseling;Offer educational classes      Education Completed  Exercise Education Completed: Cardiac Anatomy;Signs and Symptoms;Medication review;RPE;Emergency Plan;Home Exercise;Warm up/cool down;FITT Principles;BP/HR Reponse to exercise;Benefits of Exercise;End point of exercise              Exercise Follow-up/Discharge  Follow up/Discharge: Pt is inconsistent with walking program. Walks 1-4x/week, time varying between 15 to 30 minutes. Limited by back pain. Enc regular exercise (4-5x/week 20-30 minutes) as tolerates   NUTRITION  Nutrition Assessment: Reassessment      Nutrition Risk Factors:  Nutrition Risk Factors: NA      Nutrition Plan  Interventions  Other Nutrition Intervention: Diet Class;Therapist/Pt Discussion;Educational Videos;Provide with Written Material      Education Completed  Nutrition Education Completed: Low Saturated fat diet;Risk factor overview;Low sodium diet;Weight management      Goals  Nutrition Goals (Next 30 days): Patient will maintain current weight or gradual weight gain      Goals Met  Nutrition Goals Met: Patient can identify their risk factors for CAD;Completed Nutritional Risk Screen;Provided Rate your Plate Survey;Reviewed Dietitian schedule;Patient follows a low sodium diet      Height, Weight, and  BMI  Weight: 125 lb (56.7 kg)  Height: 5' 8\" (1.727 m)  BMI: 19.01      Nutrition Follow-up  Follow-up/Discharge: Met with dietician on 7/11/19 - will follow up as needed         Other Risk Factors  Other Risk Factor Assessment: Reassessment      HTN Risk Factor: NA      Pre Exercise BP: 102/70  Post Exercise BP: 102/60      Tobacco Risk Factor: NA      Risk Factor Follow-up   " Follow-up/Discharge: Will continue to provide education as needed     PSYCHOSOCIAL  Psychosocial Assessment: Reassessment       Ly AVALOS Q of L Summary Score: 28      Psychosocial Risk Factor: Stress      Psychosocial Plan  Interventions  Interventions: Offer Spiritual Care consult;Offer educational videos and classes;Provide written material;Individual education and counseling      Education Completed  Education Completed: Relaxation/Coping Techniques;Effects of stress on body      Goals  Goals (Next 30 days): Identify stressors;Practicing stress management skills      Goals Met  Goals Met: Identified Support system;Oriented to stress management classes      Psychosocial Follow-up  Follow-up/Discharge: Reviewed stress mgmt and effects of stress on the heart       Patient involved in Goal setting?: Yes      Signature: _____________________________________________________________    Date: __________________    Time: __________________

## 2021-05-31 ENCOUNTER — RECORDS - HEALTHEAST (OUTPATIENT)
Dept: ADMINISTRATIVE | Facility: CLINIC | Age: 56
End: 2021-05-31

## 2021-05-31 VITALS — WEIGHT: 183 LBS | BODY MASS INDEX: 27.83 KG/M2

## 2021-05-31 VITALS — BODY MASS INDEX: 25.61 KG/M2 | WEIGHT: 169 LBS | HEIGHT: 68 IN

## 2021-05-31 VITALS — WEIGHT: 184 LBS | BODY MASS INDEX: 27.89 KG/M2 | HEIGHT: 68 IN

## 2021-05-31 VITALS — HEIGHT: 64 IN | BODY MASS INDEX: 29.19 KG/M2 | WEIGHT: 171 LBS

## 2021-05-31 VITALS — BODY MASS INDEX: 28.31 KG/M2 | WEIGHT: 186.8 LBS | HEIGHT: 68 IN

## 2021-05-31 VITALS — HEIGHT: 68 IN | BODY MASS INDEX: 27.28 KG/M2 | WEIGHT: 180 LBS

## 2021-05-31 VITALS — WEIGHT: 167 LBS | BODY MASS INDEX: 25.31 KG/M2 | HEIGHT: 68 IN

## 2021-05-31 VITALS — BODY MASS INDEX: 27.52 KG/M2 | WEIGHT: 181 LBS

## 2021-05-31 NOTE — TELEPHONE ENCOUNTER
Refill Approved    Rx renewed per Medication Renewal Policy. Medication was last renewed on 1/16/19.    Nelida Johnson, [facility] Triage/Med Refill 8/14/2019     Requested Prescriptions   Pending Prescriptions Disp Refills     levothyroxine (SYNTHROID, LEVOTHROID) 100 MCG tablet [Pharmacy Med Name: LEVOTHYROXINE 100 MCG TABLET] 90 tablet 1     Sig: TAKE 1 TABLET BY MOUTH EVERY DAY       Thyroid Hormones Protocol Passed - 8/14/2019  1:19 AM        Passed - Provider visit in past 12 months or next 3 months     Last office visit with prescriber/PCP: 5/29/2019 Fredrick Wolff MD OR same dept: 8/2/2019 Rosalino Greer CNP OR same specialty: 8/2/2019 Rosalino Greer CNP  Last physical: 9/12/2016 Last MTM visit: Visit date not found   Next visit within 3 mo: Visit date not found  Next physical within 3 mo: Visit date not found  Prescriber OR PCP: Fredrick Wolff MD  Last diagnosis associated with med order: 1. Hypothyroidism, unspecified type  - levothyroxine (SYNTHROID, LEVOTHROID) 100 MCG tablet [Pharmacy Med Name: LEVOTHYROXINE 100 MCG TABLET]; TAKE 1 TABLET BY MOUTH EVERY DAY  Dispense: 90 tablet; Refill: 1    If protocol passes may refill for 12 months if within 3 months of last provider visit (or a total of 15 months).             Passed - TSH on file in past 12 months for patient age 12 & older     TSH   Date Value Ref Range Status   05/17/2019 2.67 0.30 - 5.00 uIU/mL Final

## 2021-05-31 NOTE — PROGRESS NOTES
ITP ASSESSMENT   Assessment Day: 150 Day - Discharge Note    Session Number: 20  Precautions: S/P transplant    Diagnosis: Heart transplant    Risk Stratification: High    Referring Provider: Fredrick Wolff MD  EXERCISE  Exercise Assessment: Reassessment       Pt completed 20 sessions of rehab without CV sx. Pt feels he is ready to discontinue cardiac rehab and maintain his exercise program independently. Pt to follow up with transplant team in October.                       Exercise Plan    Education Goals Met: Patient can state cardiac s/s and appropriate emergency response.;Has system for taking medication.;Medication review.                          Goals Met    120 day ADL'S goals met: Pt is walking as tolerates - goal met    120 day Leisure goals met: Pt is attending rehab 2x/week - goal met    120 day Work goals met: Pt is tolerating PT work - goal met    120 Day Progress: Pt is progressing well. Feels ready for discharge from cardiac rehab. Pt to follow up with transplant team in October      90 day ADL'S goals met: Pt is walking 3-5 x/week - goal in progress    90 day Leisure goals met: Pt attended event at [facility] - goal met    90 day Work goals met: Pt has resumed PT work - goal met    90 Day Progress: Progressing well, reports his main limitation at this time is back pain      60 day ADL'S goals met: Goal met- Pt is walking 2-3 days per week at home    60 day Leisure goals met: Goal met- Pt is back to playing guitar.    60 Day Progression: Pt is making progress. Set new goals for next 30 days.       30 day ADL'S goals met: Has not started a home walking Program yet    30 day Leisure goals met: Has not resumed playing guitar    30 Day Progression: Was hospitalized recently 5/24=5/26/19 With Dehydration      Initial ADL's goals met: Has not yet started a home walking program    Initial Leisure goals met: Moved back to his apartment; Independently    Initial Progression: Was hospitalized at the   "x 10 days;  Goes to infusion daily      Current Exercise (mins/week): 120      Interventions  Home Exercise:  Mode: Walk    Frequency: 4-7 days/week    Duration: 30-40 minutes      Education Material : Educational videos;Provide written material;Individual education and counseling;Offer educational classes      Education Completed  Exercise Education Completed: Cardiac Anatomy;Signs and Symptoms;Medication review;RPE;Emergency Plan;Home Exercise;Warm up/cool down;FITT Principles;BP/HR Reponse to exercise;Benefits of Exercise;End point of exercise              Exercise Follow-up/Discharge  Follow up/Discharge: Reviewed and issued home exercise program. Pt states understanding   NUTRITION  Nutrition Assessment: Reassessment      Nutrition Risk Factors:  Nutrition Risk Factors: NA      Nutrition Plan  Interventions    Other Nutrition Intervention: Diet Class;Therapist/Pt Discussion;Educational Videos;Provide with Written Material      Education Completed  Nutrition Education Completed: Low Saturated fat diet;Risk factor overview;Low sodium diet;Weight management      Goals Met  Nutrition Goals Met: Patient can identify their risk factors for CAD;Completed Nutritional Risk Screen;Provided Rate your Plate Survey;Reviewed Dietitian schedule;Patient follows a low sodium diet      Height, Weight, and  BMI  Weight: 125 lb (56.7 kg)  Height: 5' 8\" (1.727 m)  BMI: 19.01    Pre BMI: 19.01     Nutrition Follow-up  Follow-up/Discharge: Reviewed heart healthy diet, pt states understanding         Other Risk Factors  Other Risk Factor Assessment: Reassessment      HTN Risk Factor: NA      Pre Exercise BP: 100/70  Post Exercise BP: 102/68      Hypertension Plan    Tobacco Risk Factor: NA      Risk Factor Follow-up   Follow-up/Discharge: States understanding of all discharge information     PSYCHOSOCIAL  Psychosocial Assessment: Reassessment        Psychosocial Risk Factor: Stress      Psychosocial Plan  Interventions  If PHQ-9 is " >9, send letter to MD  Interventions: Offer Spiritual Care consult;Offer educational videos and classes;Provide written material;Individual education and counseling      Education Completed  Education Completed: Relaxation/Coping Techniques;Effects of stress on body      Goals Met  Goals Met: Identified Support system;Oriented to stress management classes      Psychosocial Follow-up  Follow-up/Discharge: Reviewed importance of risk factor modification and stress mgmt       Patient involved in Goal setting?: Yes      Signature: _____________________________________________________________    Date: __________________    Time: __________________

## 2021-05-31 NOTE — PATIENT INSTRUCTIONS - HE
[facility] Home Exercise Program    Exercise Goal:    Modality Duration Intensity   Warm-up 5 minutes Slow   Walk 30-40 minutes 11-14 RPE   Bike 30-40 minutes Level 6 or 7   Cool Down 5 minutes Slow   Stretching 5-10 minutes As tolerates     Target Heart Rate: 20-30 beats above resting  Range of Perceived Exertion: 11-14    Perceived exertion  (RPE)  Abner Scale   6  7      Very, very light  8  9      Very light  10  11    Fairly light  12  13    Somewhat hard  14  15    Hard  16  17    Very hard  18  19    Very, very hard  20     Special Comments/ Recommendations:  -Lipid Profile with Physician/ Follow a heart healthy diet low in sodium and saturated fats  -Take all medications as prescribed  -Exercise 4-7 days/week for 30-40 minutes  -Manage stress  -Follow closely with transplant team - follow all instructions    Stop Exercise!!! If any of the following occur:    Angina/chest pain    Dizziness    Excessive perspiration/cold sweats    Abnormal shortness of breath    Changes in heart rate (slow, fast, irregular)    Sudden fatigue or numbness    Nausea  Also...    Avoid extreme temperatures - exercise indoors if necessary    Wait at least 1 hour after a meal before strenuous activity    Do not exercise if you have a fever or are ill    Wear comfortable, supportive athletic shoes

## 2021-05-31 NOTE — PROGRESS NOTES
Clinic Note    Assessment:     Assessment and Plan:  1. Myalgia  He is experiencing back pain, leg pain, and hand pain over the last 6 weeks.  I told him that this is probably due to a combination of adverse reactions to his CellCept and Prograf.  He has also been standing for long periods of time at work.  Plan is to have him transition to a different type of position/activity while at work to avoid prolonged standing.  If he needs a note for work in the future, I told him I would provide this for him.  He is going to follow-up with his transplant team regarding his medications.  He will have his tacrolimus levels rechecked today.             Subjective:      Everton Larios is a 54 y.o. male who comes to the clinic today for generalized pain.    He is been having issues with back pain, leg pain, and hand pain going back at least the last 6 weeks.  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  He finds himself standing for long periods of time.    He is on CellCept and Prograf due to heart transplantation in March.    He denies any saddle anesthesia or radicular type pain.    He is using extra strength Tylenol multiple times per day, he tries to keep his total daily dose less than 4000 mg/day.  He does not drink alcohol.    No strange fevers, cough, chest pain, or shortness of breath.    No cold or flulike illness.    He would like his tacrolimus levels checked today.    The following portions of the patient's history were reviewed and updated as appropriate: Allergies, medications, problem list, prior note.     Review of Systems:    Review is otherwise negative except for what is mentioned above.     Social Hx:    Social History     Tobacco Use   Smoking Status Former Smoker     Packs/day: 1.00     Years: 25.00     Pack years: 25.00     Last attempt to quit: 11/6/2008     Years since quitting: 10.7   Smokeless Tobacco Never Used         Objective:     Vitals:    08/02/19 0830   BP:  104/62   Patient Site: Right Arm   Patient Position: Sitting   Cuff Size: Adult Regular   Pulse: (!) 108   Weight: 134 lb (60.8 kg)       Exam:    General: No apparent distress. Calm. Alert and Oriented X3. Pt behavior is appropriate.      Patient Active Problem List   Diagnosis     Osteopenia     Hypothyroidism     Non-ischemic cardiomyopathy (H)     Pierce's Esophagus     Ulcerative Colitis     Paroxysmal Atrial Fibrillation     Atrial Flutter     Ventricular Tachycardia     ASD (atrial septal defect)     Primary osteoarthritis involving multiple joints     ICD (implantable cardioverter-defibrillator), biventricular, in situ     Chronic seasonal allergic rhinitis     Acute on chronic systolic congestive heart failure (H)     Community acquired pneumonia, unspecified laterality     Mild persistent asthma without complication     Dysthymia     Systolic CHF, chronic (H)     Medication monitoring encounter     Heart replaced by transplant (H)     Pulmonary nodules     Sepsis due to Pseudomonas aeruginosa (H)     Current Outpatient Medications   Medication Sig Dispense Refill     acetaminophen (TYLENOL) 325 MG tablet Take 650 mg by mouth every 4 (four) hours as needed for pain.              albuterol (PROAIR HFA;PROVENTIL HFA;VENTOLIN HFA) 90 mcg/actuation inhaler Inhale 2 puffs as needed.              amLODIPine (NORVASC) 5 MG tablet Take 5 mg by mouth daily.  0     aspirin 81 mg chewable tablet Chew 81 mg daily.       calcium carbonate-vit D3-min 600 mg calcium- 400 unit Tab Take 1 tablet by mouth 2 (two) times a day with meals.              calcium, as carbonate, (TUMS) 200 mg calcium (500 mg) chewable tablet Chew 1 tablet daily as needed for heartburn.       cetirizine (ZYRTEC) 10 MG tablet Take 10 mg by mouth daily.       DULoxetine (CYMBALTA) 20 MG capsule Take 1 capsule by mouth daily .             fluticasone propionate (FLOVENT HFA) 220 mcg/actuation inhaler Inhale 2 puffs 2 (two) times a day.       KLOR-CON  M20 20 mEq tablet TAKE 1 TABLET(20 MEQ) BY MOUTH DAILY (Patient taking differently: 1 tablet daily.) 90 tablet 1     levothyroxine (SYNTHROID, LEVOTHROID) 100 MCG tablet Take 1 tablet (100 mcg total) by mouth daily. 30 tablet 3     magnesium oxide (MAG-OX) 400 mg (241.3 mg magnesium) tablet Take 400 mg by mouth 2 (two) times a day.              melatonin 3 mg Tab tablet Take 6 mg by mouth at bedtime as needed.       montelukast (SINGULAIR) 10 mg tablet TAKE 1 TABLET BY MOUTH AT BEDTIME 90 tablet 2     multivitamin with minerals (THERA M PLUS, FERROUS FUMARAT,) 9 mg iron-400 mcg Tab tablet Take 1 tablet by mouth daily.       mycophenolate (CELLCEPT) 250 mg capsule Take 1,500 mg by mouth 2 (two) times a day.              omeprazole (PRILOSEC) 40 MG capsule Take 40 mg by mouth every evening.       polyethylene glycol (MIRALAX) 17 gram packet Take 17 g by mouth as needed.       rosuvastatin (CRESTOR) 10 MG tablet Take 10 mg by mouth daily.       senna-docusate (PERICOLACE) 8.6-50 mg tablet Take 2 tablets by mouth 2 (two) times a day as needed.              tacrolimus (PROGRAF) 1 MG capsule Take by mouth. Take 4 tablets in the morning and 4 tablets in the evening.             VENTOLIN HFA 90 mcg/actuation inhaler TAKE 2 PUFFS BY MOUTH EVERY 6 HOURS AS NEEDED FOR WHEEZE 18 Inhaler 0     alpha-D-galactosidase (BEANO ORAL) Take 2 tablets by mouth 3 (three) times a day.       azelastine (ASTELIN) 137 mcg (0.1 %) nasal spray 2 sprays into each nostril 2 (two) times a day. Use in each nostril as directed (Patient not taking: Reported on 8/2/2019      ) 30 mL 5     balsalazide (COLAZAL) 750 mg capsule 2 capsules twice daily  5     bumetanide (BUMEX) 2 MG tablet Take 2 mg by mouth 2 (two) times a day.       cefepime HCl (CEFEPIME IV) Infuse 2 g into a venous catheter every 12 (twelve) hours. Via cadd pump       fluticasone (FLONASE) 50 mcg/actuation nasal spray Apply 1 spray into each nostril 2 (two) times a day as needed.               micafungin sodium (MICAFUNGIN IV) Infuse 150 mg into a venous catheter daily.       nystatin (MYCOSTATIN) 100,000 unit/mL suspension Take 500,000 Units by mouth 4 (four) times a day.       predniSONE (DELTASONE) 5 MG tablet Take 5 mg by mouth daily Take 5 mg by mouth every evening Take 10 mg (2 tablets) every am, and 5 mg (1 tablet) every evening.       No current facility-administered medications for this visit.          Rosalino Greer (Rob), ESTRELLITA    8/2/2019

## 2021-06-01 VITALS — WEIGHT: 150 LBS | HEIGHT: 68 IN | BODY MASS INDEX: 22.73 KG/M2

## 2021-06-01 VITALS — WEIGHT: 168 LBS | BODY MASS INDEX: 28.68 KG/M2 | HEIGHT: 64 IN

## 2021-06-01 VITALS — WEIGHT: 148 LBS | BODY MASS INDEX: 22.43 KG/M2 | HEIGHT: 68 IN

## 2021-06-01 VITALS — HEIGHT: 68 IN | BODY MASS INDEX: 24.71 KG/M2 | WEIGHT: 163 LBS

## 2021-06-01 VITALS — BODY MASS INDEX: 25.07 KG/M2 | HEIGHT: 68 IN | WEIGHT: 165.4 LBS

## 2021-06-01 VITALS — HEIGHT: 68 IN | BODY MASS INDEX: 24.95 KG/M2 | WEIGHT: 164.6 LBS

## 2021-06-01 VITALS — WEIGHT: 161 LBS | BODY MASS INDEX: 24.4 KG/M2 | HEIGHT: 68 IN

## 2021-06-01 VITALS — WEIGHT: 165 LBS | BODY MASS INDEX: 25.3 KG/M2

## 2021-06-01 VITALS — BODY MASS INDEX: 24.78 KG/M2 | WEIGHT: 163 LBS

## 2021-06-01 VITALS — BODY MASS INDEX: 25.01 KG/M2 | WEIGHT: 165 LBS | HEIGHT: 68 IN

## 2021-06-01 VITALS — HEIGHT: 68 IN | WEIGHT: 151 LBS | BODY MASS INDEX: 22.88 KG/M2

## 2021-06-01 NOTE — PATIENT INSTRUCTIONS - HE
Letter provided for work today.  If they need additional paperwork completed, tell them to fax it to me so that I can complete it.    Try Aspercreme for your back.  Make sure it does not have lidocaine in it.    Let us know if you would like professional counseling/therapy for your depressed mood.    Follow-up with Dr. Fredrick Wolff for routine checkup in 2 to 3 months.

## 2021-06-01 NOTE — TELEPHONE ENCOUNTER
Refill Approved    Rx renewed per Medication Renewal Policy. Medication was last renewed on 3/8/16.    oJry Palomino, [facility] Triage/Med Refill 9/22/2019     Requested Prescriptions   Pending Prescriptions Disp Refills     DULoxetine (CYMBALTA) 20 MG capsule [Pharmacy Med Name: DULOXETINE HCL DR 20 MG CAP] 30 capsule 35     Sig: TAKE 1 CAPSULE BY MOUTH ONCE DAILY       Tricyclics/Misc Antidepressant/Antianxiety Meds Refill Protocol Passed - 9/22/2019  8:56 AM        Passed - PCP or prescribing provider visit in last year     Last office visit with prescriber/PCP: 5/29/2019 Fredrick Wolff MD OR same dept: 9/20/2019 Rosalino Greer CNP OR same specialty: 9/20/2019 Rosalino Greer CNP  Last physical: 9/12/2016 Last MTM visit: Visit date not found   Next visit within 3 mo: Visit date not found  Next physical within 3 mo: Visit date not found  Prescriber OR PCP: Fredrick Wolff MD  Last diagnosis associated with med order: There are no diagnoses linked to this encounter.  If protocol passes may refill for 12 months if within 3 months of last provider visit (or a total of 15 months).

## 2021-06-01 NOTE — PROGRESS NOTES
Clinic Note    Assessment:     Assessment and Plan:  1. Myalgia  I gave him a note for work so that he could avoid prolonged sitting/standing which seems to exacerbate his myalgias and low back pain    2. Adjustment disorder with depressed mood  He is having some issues with depressed mood as his medical bills are mounting.  He is frustrated at the lack of progress in his general well-being.  We talked extensively today about his counseling for his mood.    3. Need for immunization against influenza  - Influenza,Seasonal,Quad,INJ =/>6months       Patient Instructions   Letter provided for work today.  If they need additional paperwork completed, tell them to fax it to me so that I can complete it.    Try Aspercreme for your back.  Make sure it does not have lidocaine in it.    Let us know if you would like professional counseling/therapy for your depressed mood.    Follow-up with Dr. Fredrick Wolff for routine checkup in 2 to 3 months.    Return in about 3 months (around 12/20/2019).         Subjective:      Patient comes to clinic today for follow-up.    I originally saw him approximately 1 month ago, that time for diffuse myalgias and arthralgias thought to be due to his CellCept and Prograf.  He is using the medications for recent cardiac transplantation.    Patient works as a  at a grocery store.  There are periods in which he is standing and sitting for prolonged amounts of time.  This causes issues with his myalgias/arthralgias in the form of severe low back pain.    He is tried accommodating his  job with a stool but this has been insufficient.  He is tried lidocaine patches for his low back but these seem to make him nauseous.    He is been dealing with frequent loose stools.  This is being worked up by infectious disease.  Possible CMV component.  He has stool samples that he is going to drop off at lab today.    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  The frequent moving, bending, and stretching seem to be good for his low back.    He has an elevated PHQ 9 today.  He is been struggling with his finances lately with accumulating medical bills.  He does not feel well from a physical perspective which exacerbates his depressed mood.  He is not interested in counseling or therapy at this point.  He does not want to make any further changes in medications for now.  He denies any suicidal or homicidal ideation    The following portions of the patient's history were reviewed and updated as appropriate: Allergies, medications, problems, prior note.    Review of Systems:    Review is otherwise negative except for what is mentioned above.     Social Hx:    Social History     Tobacco Use   Smoking Status Former Smoker     Packs/day: 1.00     Years: 25.00     Pack years: 25.00     Last attempt to quit: 11/6/2008     Years since quitting: 10.8   Smokeless Tobacco Never Used         Objective:     Vitals:    09/20/19 1038   BP: 115/76   Patient Site: Right Arm   Patient Position: Sitting   Cuff Size: Adult Regular   Pulse: (!) 113   SpO2: 100%   Weight: 123 lb 9.6 oz (56.1 kg)       Exam:    General: No apparent distress. Calm. Alert and Oriented X3. Pt behavior is appropriate.  Musculoskeletal: No CVA tenderness with palpation. Good ROM with extremities.   Neurologic: Interactive, alert, no focal findings, CNs intact.   Skin: Warm, dry. Normal hair pattern. Free of lesions. Normal skin turgor.       Patient Active Problem List   Diagnosis     Osteopenia     Hypothyroidism     Non-ischemic cardiomyopathy (H)     Pierce's Esophagus     Ulcerative Colitis     Paroxysmal Atrial Fibrillation     Atrial Flutter     Ventricular Tachycardia     ASD (atrial septal defect)     Primary osteoarthritis involving multiple joints     ICD (implantable cardioverter-defibrillator),  biventricular, in situ     Chronic seasonal allergic rhinitis     Acute on chronic systolic congestive heart failure (H)     Community acquired pneumonia, unspecified laterality     Mild persistent asthma without complication     Dysthymia     Systolic CHF, chronic (H)     Medication monitoring encounter     Heart replaced by transplant (H)     Pulmonary nodules     Sepsis due to Pseudomonas aeruginosa (H)     Current Outpatient Medications   Medication Sig Dispense Refill     acetaminophen (TYLENOL) 325 MG tablet Take 650 mg by mouth every 4 (four) hours as needed for pain.              albuterol (PROAIR HFA;PROVENTIL HFA;VENTOLIN HFA) 90 mcg/actuation inhaler Inhale 2 puffs as needed.              amLODIPine (NORVASC) 5 MG tablet Take 5 mg by mouth daily.  0     aspirin 81 mg chewable tablet Chew 81 mg daily.       azelastine (ASTELIN) 137 mcg (0.1 %) nasal spray 2 sprays into each nostril 2 (two) times a day. Use in each nostril as directed 30 mL 5     balsalazide (COLAZAL) 750 mg capsule 2 capsules twice daily  5     calcium carbonate-vit D3-min 600 mg calcium- 400 unit Tab Take 1 tablet by mouth 2 (two) times a day with meals.              calcium, as carbonate, (TUMS) 200 mg calcium (500 mg) chewable tablet Chew 1 tablet daily as needed for heartburn.       cetirizine (ZYRTEC) 10 MG tablet Take 10 mg by mouth daily.       DULoxetine (CYMBALTA) 20 MG capsule Take 1 capsule by mouth daily .             KLOR-CON M20 20 mEq tablet TAKE 1 TABLET(20 MEQ) BY MOUTH DAILY (Patient taking differently: 1 tablet daily.) 90 tablet 1     levothyroxine (SYNTHROID, LEVOTHROID) 100 MCG tablet TAKE 1 TABLET BY MOUTH EVERY DAY 90 tablet 2     magnesium oxide (MAG-OX) 400 mg (241.3 mg magnesium) tablet Take 400 mg by mouth 2 (two) times a day.              melatonin 3 mg Tab tablet Take 6 mg by mouth at bedtime as needed.       mesalamine (ASACOL HD) 800 mg TbEC EC tablet Take 800 mg by mouth.       montelukast (SINGULAIR) 10 mg  tablet TAKE 1 TABLET BY MOUTH AT BEDTIME 90 tablet 2     multivitamin with minerals (THERA M PLUS, FERROUS FUMARAT,) 9 mg iron-400 mcg Tab tablet Take 1 tablet by mouth daily.       mycophenolate (CELLCEPT) 250 mg capsule Take 1,500 mg by mouth 2 (two) times a day.              omeprazole (PRILOSEC) 40 MG capsule Take 40 mg by mouth every evening.       polyethylene glycol (MIRALAX) 17 gram packet Take 17 g by mouth as needed.       rosuvastatin (CRESTOR) 10 MG tablet Take 10 mg by mouth daily.       senna-docusate (PERICOLACE) 8.6-50 mg tablet Take 2 tablets by mouth 2 (two) times a day as needed.              tacrolimus (PROGRAF) 1 MG capsule Take by mouth. Take 4 tablets in the morning and 4 tablets in the evening.             valGANciclovir (VALCYTE) 450 mg tablet Take 450 mg by mouth.       VENTOLIN HFA 90 mcg/actuation inhaler TAKE 2 PUFFS BY MOUTH EVERY 6 HOURS AS NEEDED FOR WHEEZE 18 Inhaler 0     fluticasone (FLONASE) 50 mcg/actuation nasal spray Apply 1 spray into each nostril 2 (two) times a day as needed.              fluticasone propionate (FLOVENT HFA) 220 mcg/actuation inhaler Inhale 2 puffs 2 (two) times a day.       No current facility-administered medications for this visit.        I spent 20 minutes with patient face to face, of which >50% was counseling regarding the above plan       Rosalino Greer (Rob), ESTRELLITA    9/20/2019

## 2021-06-02 ENCOUNTER — RECORDS - HEALTHEAST (OUTPATIENT)
Dept: ADMINISTRATIVE | Facility: CLINIC | Age: 56
End: 2021-06-02

## 2021-06-02 VITALS — BODY MASS INDEX: 21.74 KG/M2 | WEIGHT: 143 LBS

## 2021-06-02 VITALS — WEIGHT: 146.9 LBS | BODY MASS INDEX: 22.34 KG/M2

## 2021-06-02 VITALS — HEIGHT: 68 IN | WEIGHT: 147 LBS | BODY MASS INDEX: 22.28 KG/M2

## 2021-06-02 VITALS — WEIGHT: 152 LBS | BODY MASS INDEX: 23.04 KG/M2 | HEIGHT: 68 IN

## 2021-06-02 VITALS — HEIGHT: 68 IN | BODY MASS INDEX: 23.95 KG/M2 | WEIGHT: 158 LBS

## 2021-06-02 VITALS — BODY MASS INDEX: 22.88 KG/M2 | HEIGHT: 68 IN | WEIGHT: 151 LBS

## 2021-06-03 VITALS — BODY MASS INDEX: 19.77 KG/M2 | WEIGHT: 130 LBS

## 2021-06-03 VITALS — BODY MASS INDEX: 18.25 KG/M2 | WEIGHT: 120 LBS

## 2021-06-03 VITALS — WEIGHT: 121 LBS | BODY MASS INDEX: 18.4 KG/M2

## 2021-06-03 VITALS — BODY MASS INDEX: 19.01 KG/M2 | WEIGHT: 125 LBS

## 2021-06-03 VITALS — WEIGHT: 118.2 LBS | BODY MASS INDEX: 17.97 KG/M2

## 2021-06-03 VITALS — WEIGHT: 123 LBS | BODY MASS INDEX: 18.7 KG/M2

## 2021-06-03 VITALS — WEIGHT: 119 LBS | BODY MASS INDEX: 18.09 KG/M2

## 2021-06-03 VITALS — WEIGHT: 119.8 LBS | BODY MASS INDEX: 18.22 KG/M2

## 2021-06-03 VITALS — BODY MASS INDEX: 19.31 KG/M2 | WEIGHT: 127 LBS

## 2021-06-03 VITALS — BODY MASS INDEX: 18.55 KG/M2 | WEIGHT: 122 LBS

## 2021-06-03 VITALS — WEIGHT: 125 LBS | BODY MASS INDEX: 19.01 KG/M2

## 2021-06-03 VITALS — BODY MASS INDEX: 19.61 KG/M2 | WEIGHT: 129 LBS

## 2021-06-03 VITALS — BODY MASS INDEX: 18.7 KG/M2 | WEIGHT: 123 LBS

## 2021-06-03 VITALS — WEIGHT: 133 LBS | BODY MASS INDEX: 20.22 KG/M2

## 2021-06-03 VITALS — WEIGHT: 139 LBS | BODY MASS INDEX: 21.13 KG/M2

## 2021-06-03 VITALS
OXYGEN SATURATION: 100 % | DIASTOLIC BLOOD PRESSURE: 76 MMHG | WEIGHT: 123.6 LBS | HEART RATE: 113 BPM | BODY MASS INDEX: 18.84 KG/M2 | SYSTOLIC BLOOD PRESSURE: 115 MMHG

## 2021-06-03 VITALS — BODY MASS INDEX: 20.24 KG/M2 | WEIGHT: 133.1 LBS

## 2021-06-03 VITALS — BODY MASS INDEX: 20.07 KG/M2 | WEIGHT: 132 LBS

## 2021-06-03 VITALS — BODY MASS INDEX: 20.37 KG/M2 | WEIGHT: 134 LBS

## 2021-06-03 VITALS — WEIGHT: 130 LBS | BODY MASS INDEX: 19.77 KG/M2

## 2021-06-04 VITALS
OXYGEN SATURATION: 100 % | BODY MASS INDEX: 24.81 KG/M2 | DIASTOLIC BLOOD PRESSURE: 66 MMHG | HEART RATE: 104 BPM | WEIGHT: 163.7 LBS | HEIGHT: 68 IN | SYSTOLIC BLOOD PRESSURE: 130 MMHG

## 2021-06-04 VITALS
BODY MASS INDEX: 23.63 KG/M2 | WEIGHT: 148.6 LBS | DIASTOLIC BLOOD PRESSURE: 72 MMHG | HEART RATE: 116 BPM | SYSTOLIC BLOOD PRESSURE: 126 MMHG

## 2021-06-04 VITALS
SYSTOLIC BLOOD PRESSURE: 126 MMHG | BODY MASS INDEX: 23.81 KG/M2 | HEART RATE: 104 BPM | WEIGHT: 157.1 LBS | DIASTOLIC BLOOD PRESSURE: 80 MMHG | HEIGHT: 68 IN

## 2021-06-04 VITALS
SYSTOLIC BLOOD PRESSURE: 137 MMHG | WEIGHT: 145 LBS | DIASTOLIC BLOOD PRESSURE: 86 MMHG | BODY MASS INDEX: 22.05 KG/M2 | HEART RATE: 112 BPM

## 2021-06-04 NOTE — PROGRESS NOTES
[facility] clinic Follow Up Note    Everton Larios   54 y.o. male    Date of Visit: 12/11/2019    Chief Complaint   Patient presents with     Follow-up     Subjective  Everton is here for general follow-up but he had a chief complaint today of left knee pain and ankle achiness at his Achilles tendon.    Patient is back to work working at [employer].  He has been standing more at his current job.  Since standing more he has had a linear musculoskeletal pain down the lateral aspect from his knee to his upper shin as well as pain around his patella and anterior knee.  He has not had pain and swelling in the knee itself.  No fever.  No increased edema.  He had some achiness around his Achilles tendon.    He said plantar fasciitis in the past and is wearing orthotic shoes at work.  He is doing some stretching for his Achilles tendinitis still.    He wears slippers at home, however.  He states he puts orthotics in them but they are not orthotic slippers.    Pain is better when he is not at work.  Patient tends to come home from work and sit.  He is not getting regular walking exercise.    He is not taking pain medication.  No radicular type shooting pain.  No leg weakness.    No skin changes or fever or increased edema.    Patient has been through a number of medical issues this fall, he had significant diarrhea with C. difficile and CMV.    He just saw infectious disease on December 3.  Patient is improving and off treatment for the CMV now.    No increased cough.  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    He is also had mild rejection noted of his heart transplant which was done in February.    He seen closely by the transplant team at [facility].    Patient did have elevation of his alkaline phosphatase level in the underwent an MRCP on December 6 which I did review with patient today.  The bile system was reported as normal.  There was some homogeneity silly  hyperenhancing foci throughout the liver, however.  Some hemangiomas and cysts seen.  A follow-up MRI in 6 months was recommended.    Previous T12 compression fracture noted.  His back pain is significantly better.    Chronic work stress and anxiety feels is well controlled on the Cymbalta 20 mg.    No palpitations or ventricular arrhythmia recently.  He does have a history of V. tach and long QTc.    No flare of his ulcerative colitis currently.  Still on mesalamine twice a day.    Apparently GI is planning a colonoscopy next month, both for routine surveillance with his ulcerative colitis and also the history of CMV colitis.    He did see endocrinology in October and I did review those notes today.  He had a low TSH in September and is on a lower dose of Synthroid.  He plans to follow-up with endocrinology and get that thyroid test rechecked in February.    He did started Fosamax for osteoporosis with a compression fracture history.    No flare of his asthma.    Patient plans to continue to follow closely with [facility] physicians now that he is a heart transplant patient.    PMHx:    Past Medical History:   Diagnosis Date     Arthritis     hands, neck     ASD (atrial septal defect)     s/p repair age 3     Asthma      Atrial fibrillation (H)      Pierce's esophagus      Cataract      CHF (congestive heart failure) (H)      Clotting disorder (H)      Congenital cardiomyopathy in  (H)      Depression      DJD (degenerative joint disease)     neck     History of anesthesia complications     nausea     History of transfusion      Hypothyroidism      ICD (implantable cardioverter-defibrillator) in place      Pacemaker      Ulcerative colitis (H)      Ventricular tachycardia (H)      PSHx:    Past Surgical History:   Procedure Laterality Date     ABLATION OF DYSRHYTHMIC FOCUS      for A fib     ASD REPAIR  1968     CARDIAC DEFIBRILLATOR PLACEMENT      x2--last was 2018     COLONOSCOPY N/A  2/5/2015    Procedure: COLONOSCOPY with biopsy;  Surgeon: Fernie Akers MD;  Location: [facility];  Service:      COLONOSCOPY N/A 12/19/2017    Procedure: COLONOSCOPY with biopsies and polypectomies using snare;  Surgeon: Gael Romero MD;  Location: [facility];  Service:      ESOPHAGOGASTRODUODENOSCOPY N/A 12/19/2017    Procedure: ESOPHAGOGASTRODUODENOSCOPY (EGD) with biopsies;  Surgeon: Gael Romero MD;  Location: [facility];  Service:      HAND SURGERY Left      LIPOMA RESECTION      x2     OR REVISE MEDIAN N/CARPAL TUNNEL SURG  9/21/2016    Procedure: OPEN RIGHT CARPAL TUNNEL RELEASE CORTISONE INJECTION RIGHT THUMB;  Surgeon: Duong Santoyo MD;  Location: [facility] OR;  Service: Orthopedics     Immunizations:   Immunization History   Administered Date(s) Administered     Influenza, inj, historic,unspecified 10/22/2008, 09/25/2009, 11/07/2011     Influenza, seasonal,quad inj 6-35 mos 11/30/2012, 10/02/2014     Influenza,seasonal quad, PF, =/> 6months 10/02/2015     Influenza,seasonal,quad inj =/> 6months 09/12/2016, 10/16/2017, 09/20/2018, 09/20/2019     Pneumo Conj 13-V (2010&after) 09/26/2018     Pneumo Polysac 23-V 09/28/2019     Tdap 07/20/2012       ROS A comprehensive review of systems was performed and was otherwise negative    Medications, allergies, and problem list were reviewed and updated    Exam  /72 (Patient Site: Right Arm, Patient Position: Sitting, Cuff Size: Adult Regular)   Pulse (!) 116   Wt 148 lb 9.6 oz (67.4 kg)   BMI 22.93 kg/m    His lungs are clear to auscultation.  Good respiratory excursion and no wheezing.  Heart is tachycardic but regular.  That is consistent with his transplant heart and unchanged.  No murmur rub or gallop.  There is no ankle edema.  Abdomen is nontender and soft.    The left knee does show some mild tenderness around the patella, perhaps some mild patellar bursal effusion but he does not have a knee joint effusion itself.  The  knee is not hot or red.  Still good range of motion of the knee.  He does have a linear tightness to a band on the iliotibial band area in the lateral left knee.  That does re-create his pain.  No erythema.  No ankle edema.  He has some mild tenderness on his Achilles tendon insertion bilaterally but no erythema or sores of the feet.  He still able to ambulate within normal limits.    Assessment/Plan  1. Chronic pain of left knee  I suspect iliotibial band on the left knee with tightness and pain.  Associated with prolonged standing and then sitting at home after work.    I stressed the importance of regular walking and stretching.  He was offered a physical therapy referral but declined at this time.    I did also have him plan to discuss this with his transplant team next week.  He has a appointment next week.  I did explain to patient that because he is a transplant patient if he has increasing pain issues anywhere he should get further evaluation.  I do not suspect there is an infectious issue or fracture condition currently with the knee pain.  But he was told to get further evaluation if it is not improving.    2. Chronic ankle pain, bilateral  Consistent with Achilles tendinitis.  Patient was told to wear better orthotic shoes at home.  Do not wear the slippers.  Continue to work on stretching.    Try to walk more work and avoid prolonged standing.    3. Ulcerative colitis without complications, unspecified location (H)  No flare currently.  Working with gastroenterology through the [facility] now.  He was seen last month.    Liver lesions of unclear etiology, with his transplant status and immunosuppression.  He will follow-up with his transplant team and GI.  Anticipate follow-up MRI imaging of the liver in 6 months as recommended by radiology.    Colonoscopy will be managed by GI and is plan to do that next month.    4. Hypothyroidism, unspecified type  Now managed by endocrinology.  Follow-up in  February.  On a lower dose Synthroid.    5. Heart replaced by transplant (H)  Managed by the transplant team [facility]    6. Osteoporosis, unspecified osteoporosis type, unspecified pathological fracture presence  Compression fracture history.  Now on Fosamax, managed by endocrinology.    No flare of asthma.  Has had the flu shot.    Transplant team plans to get him the Shingrix and Pneumovax on future appointment.    His bowel symptoms have resolved, no further active C. difficile or CMV colitis.    Patient will now be managed with [facility], with his primary clinic being the transplant clinic.  He is welcome to return here as needed for other issues, otherwise plan follow-up [facility] now.    Return in about 6 months (around 6/11/2020) for Recheck.   Patient Instructions   Your knee and ankle pain is suggestive of tendinitis.  Likely Achilles tendinitis and some bursitis of your left knee.    Wear good orthotic shoes at all times.  Do daily stretching, similar to what you did for your plantar fasciitis.    Speak with your transplant team next week if they wish to do further evaluation of the pain.  You could consider referral to physical therapy.    Try to walk on a more regular basis.  Avoid prolonged standing.    Otherwise, get your care through the [facility] physicians and transplant team.    Follow-up with me as needed    Fredrick Wolff MD        Current Outpatient Medications   Medication Sig Dispense Refill     acetaminophen (TYLENOL) 325 MG tablet Take 650 mg by mouth every 4 (four) hours as needed for pain.              albuterol (PROAIR HFA;PROVENTIL HFA;VENTOLIN HFA) 90 mcg/actuation inhaler Inhale 2 puffs as needed.              alendronate (FOSAMAX) 70 MG tablet Take 1 tablet by mouth once a week.  3     amLODIPine (NORVASC) 5 MG tablet Take 5 mg by mouth daily.  0     aspirin 81 mg chewable tablet Chew 81 mg daily.       calcium carbonate-vit D3-min  600 mg calcium- 400 unit Tab Take 1 tablet by mouth 2 (two) times a day with meals.              calcium, as carbonate, (TUMS) 200 mg calcium (500 mg) chewable tablet Chew 1 tablet daily as needed for heartburn.       cetirizine (ZYRTEC) 10 MG tablet Take 10 mg by mouth daily.       DULoxetine (CYMBALTA) 20 MG capsule Take 1 capsule (20 mg total) by mouth daily. 90 capsule 3     levothyroxine (SYNTHROID, LEVOTHROID) 100 MCG tablet TAKE 1 TABLET BY MOUTH EVERY DAY 90 tablet 2     melatonin 3 mg Tab tablet Take 6 mg by mouth at bedtime as needed.       mesalamine (ASACOL HD) 800 mg TbEC EC tablet Take 800 mg by mouth.       montelukast (SINGULAIR) 10 mg tablet TAKE 1 TABLET BY MOUTH AT BEDTIME 90 tablet 2     multivitamin with minerals (THERA M PLUS, FERROUS FUMARAT,) 9 mg iron-400 mcg Tab tablet Take 1 tablet by mouth daily.       mycophenolate (CELLCEPT) 250 mg capsule Take 1,500 mg by mouth 2 (two) times a day.              omeprazole (PRILOSEC) 40 MG capsule Take 40 mg by mouth every evening.       polyethylene glycol (MIRALAX) 17 gram packet Take 17 g by mouth as needed.       rosuvastatin (CRESTOR) 10 MG tablet Take 10 mg by mouth daily.       senna-docusate (PERICOLACE) 8.6-50 mg tablet Take 2 tablets by mouth 2 (two) times a day as needed.              tacrolimus (PROGRAF) 1 MG capsule Take 3 mg by mouth 2 (two) times a day. Take 4 tablets in the morning and 4 tablets in the evening.       balsalazide (COLAZAL) 750 mg capsule 2 capsules twice daily  5     fluticasone propionate (FLOVENT HFA) 220 mcg/actuation inhaler Inhale 2 puffs 2 (two) times a day.       VENTOLIN HFA 90 mcg/actuation inhaler TAKE 2 PUFFS BY MOUTH EVERY 6 HOURS AS NEEDED FOR WHEEZE 18 Inhaler 0     No current facility-administered medications for this visit.      Allergies   Allergen Reactions     Adhesive Tape-Silicones      Other reaction(s): Blisters  Tegaderm causes bruises, blisters and extreme irritation.     Hydromorphone Other (See  Comments)     Significant Delirium     Lisinopril Other (See Comments)     hypotension     Codeine Nausea Only     nausea     Social History     Tobacco Use     Smoking status: Former Smoker     Packs/day: 1.00     Years: 25.00     Pack years: 25.00     Last attempt to quit: 2008     Years since quittin.1     Smokeless tobacco: Never Used   Substance Use Topics     Alcohol use: Yes     Alcohol/week: 6.0 standard drinks     Types: 6 Standard drinks or equivalent per week     Comment: 2 drinks 3 times weekly when out with friends     Drug use: No

## 2021-06-04 NOTE — PATIENT INSTRUCTIONS - HE
Your knee and ankle pain is suggestive of tendinitis.  Likely Achilles tendinitis and some bursitis of your left knee.    Wear good orthotic shoes at all times.  Do daily stretching, similar to what you did for your plantar fasciitis.    Speak with your transplant team next week if they wish to do further evaluation of the pain.  You could consider referral to physical therapy.    Try to walk on a more regular basis.  Avoid prolonged standing.    Otherwise, get your care through the [facility] physicians and transplant team.    Follow-up with me as needed

## 2021-06-04 NOTE — PROGRESS NOTES
Clinic Note    Assessment:     Assessment and Plan:  1. Herpes zoster without complication  He has a history of some mild renal insufficiency and wants to use twice daily dosing instead of TID.  See patient instructions below for plan of care.  - valACYclovir (VALTREX) 1000 MG tablet; Take 1 tablet (1,000 mg total) by mouth 2 (two) times a day.  Dispense: 14 tablet; Refill: 0       Patient Instructions   Use the Valtrex 1000 mg twice daily for the next 7 days.    Tylenol for pain.    Use Aquaphor or Vaseline on the skin lesions, twice daily.    Ice can work well for pain as well.    If the lesions seem like they are getting close to your eyeball, go to the ED.    Return in about 3 months (around 3/30/2020).         Subjective:      Everton Larios is a 54 y.o. male who comes the clinic today for what he believes to be a shingles type rash.    Symptoms started approximately 4 days ago with tingling type pain sensation on the right side of his neck and face.  He started noticing erythematous patches of blisters starting to form about 24 hours later.  The rash has since spread to involve his right posterior shoulder region and right jawline.    He denies any fevers.    The rash does not involve his right eye.  No vision disturbance.    No prior reported history of shingles.    The following portions of the patient's history were reviewed and updated as appropriate: Allergies, medications, problems, prior note.    Review of Systems:    Review is otherwise negative except for what is mentioned above.     Social Hx:    Social History     Tobacco Use   Smoking Status Former Smoker     Packs/day: 1.00     Years: 25.00     Pack years: 25.00     Last attempt to quit: 2008     Years since quittin.1   Smokeless Tobacco Never Used         Objective:     Vitals:    19 1122   BP: 137/86   Pulse: (!) 112   Weight: 145 lb (65.8 kg)       Exam:    General: No apparent distress. Calm. Alert and Oriented X3. Pt behavior  is appropriate.  Head: Erythematous patches of vesicular lesions on the right side of his neck and face, consistent with herpes zoster.  No signs of secondary infection.        Patient Active Problem List   Diagnosis     Osteopenia     Hypothyroidism     Non-ischemic cardiomyopathy (H)     Pierce's Esophagus     Ulcerative Colitis     Paroxysmal Atrial Fibrillation     Atrial Flutter     Ventricular Tachycardia     ASD (atrial septal defect)     Primary osteoarthritis involving multiple joints     ICD (implantable cardioverter-defibrillator), biventricular, in situ     Chronic seasonal allergic rhinitis     Acute on chronic systolic congestive heart failure (H)     Community acquired pneumonia, unspecified laterality     Mild persistent asthma without complication     Dysthymia     Systolic CHF, chronic (H)     Medication monitoring encounter     Heart replaced by transplant (H)     Pulmonary nodules     Sepsis due to Pseudomonas aeruginosa (H)     Current Outpatient Medications   Medication Sig Dispense Refill     acetaminophen (TYLENOL) 325 MG tablet Take 650 mg by mouth every 4 (four) hours as needed for pain.              albuterol (PROAIR HFA;PROVENTIL HFA;VENTOLIN HFA) 90 mcg/actuation inhaler Inhale 2 puffs as needed.              alendronate (FOSAMAX) 70 MG tablet Take 1 tablet by mouth once a week.  3     amLODIPine (NORVASC) 5 MG tablet Take 5 mg by mouth daily.  0     aspirin 81 mg chewable tablet Chew 81 mg daily.       balsalazide (COLAZAL) 750 mg capsule 2 capsules twice daily  5     calcium carbonate-vit D3-min 600 mg calcium- 400 unit Tab Take 1 tablet by mouth 2 (two) times a day with meals.              calcium, as carbonate, (TUMS) 200 mg calcium (500 mg) chewable tablet Chew 1 tablet daily as needed for heartburn.       cetirizine (ZYRTEC) 10 MG tablet Take 10 mg by mouth daily.       DULoxetine (CYMBALTA) 20 MG capsule Take 1 capsule (20 mg total) by mouth daily. 90 capsule 3     fluticasone  propionate (FLOVENT HFA) 220 mcg/actuation inhaler Inhale 2 puffs 2 (two) times a day.       levothyroxine (SYNTHROID, LEVOTHROID) 100 MCG tablet TAKE 1 TABLET BY MOUTH EVERY DAY 90 tablet 2     melatonin 3 mg Tab tablet Take 6 mg by mouth at bedtime as needed.       mesalamine (ASACOL HD) 800 mg TbEC EC tablet Take 800 mg by mouth.       montelukast (SINGULAIR) 10 mg tablet TAKE 1 TABLET BY MOUTH AT BEDTIME 90 tablet 2     multivitamin with minerals (THERA M PLUS, FERROUS FUMARAT,) 9 mg iron-400 mcg Tab tablet Take 1 tablet by mouth daily.       mycophenolate (CELLCEPT) 250 mg capsule Take 1,500 mg by mouth 2 (two) times a day.              omeprazole (PRILOSEC) 40 MG capsule Take 40 mg by mouth every evening.       polyethylene glycol (MIRALAX) 17 gram packet Take 17 g by mouth as needed.       rosuvastatin (CRESTOR) 10 MG tablet Take 10 mg by mouth daily.       senna-docusate (PERICOLACE) 8.6-50 mg tablet Take 2 tablets by mouth 2 (two) times a day as needed.              tacrolimus (PROGRAF) 1 MG capsule Take 3 mg by mouth 2 (two) times a day. Take 4 tablets in the morning and 4 tablets in the evening.       VENTOLIN HFA 90 mcg/actuation inhaler TAKE 2 PUFFS BY MOUTH EVERY 6 HOURS AS NEEDED FOR WHEEZE 18 Inhaler 0     valACYclovir (VALTREX) 1000 MG tablet Take 1 tablet (1,000 mg total) by mouth 2 (two) times a day. 14 tablet 0     No current facility-administered medications for this visit.            Rosalino Greer (Rob), ESTRELLITA    12/30/2019

## 2021-06-04 NOTE — PATIENT INSTRUCTIONS - HE
Use the Valtrex 1000 mg twice daily for the next 7 days.    Tylenol for pain.    Use Aquaphor or Vaseline on the skin lesions, twice daily.    Ice can work well for pain as well.    If the lesions seem like they are getting close to your eyeball, go to the ED.

## 2021-06-05 ENCOUNTER — RECORDS - HEALTHEAST (OUTPATIENT)
Dept: INTERNAL MEDICINE | Facility: CLINIC | Age: 56
End: 2021-06-05

## 2021-06-05 VITALS
HEART RATE: 109 BPM | TEMPERATURE: 98.1 F | RESPIRATION RATE: 16 BRPM | DIASTOLIC BLOOD PRESSURE: 88 MMHG | BODY MASS INDEX: 25.95 KG/M2 | WEIGHT: 171.25 LBS | SYSTOLIC BLOOD PRESSURE: 130 MMHG | HEIGHT: 68 IN | OXYGEN SATURATION: 96 %

## 2021-06-05 DIAGNOSIS — N28.9 DISORDER OF KIDNEY AND URETER: ICD-10-CM

## 2021-06-05 NOTE — PATIENT INSTRUCTIONS - HE
Plan to stop aspirin 1 week before your colonoscopy and restart aspirin day after colonoscopy if no major bleeding.    Determine the findings of your cardiac catheterization, prior to stopping the aspirin, however.    Stop the multivitamin with minerals now, restart after the colonoscopy.    Continue your other medications as you are currently taking them, including the morning of the colonoscopy.  You can take your morning medications with a sip of water that day.    Continue to follow closely with the transplant team.  You can consider a follow-up appointment with me in the fall in approximately 6 months, or you can consider changing your follow-up plan depending on situation.

## 2021-06-05 NOTE — PROGRESS NOTES
Preoperative Exam    Scheduled Procedure: Colonoscopy  Surgery Date:  02/20/2020  Surgery Location: [facility] (surgeon's fax #: [phone])   Surgeon:  Ranjeet Cooper MD     Assessment/Plan:     1. Preoperative examination  Patient cleared to proceed with colonoscopy as planned for ulcerative colitis follow-up and recent CMV colitis follow-up.    There is no active heart failure rejection currently.  No evidence of current infection.    Ulcerative colitis is under control with balsalazide, regular bowel movements without bloody diarrhea.  He has finished his CMV treatment.    I did discuss risks of colonoscopy including perforation, he accepts these risks.  He has had previous colonoscopies.    2. Ulcerative colitis without complications, unspecified location (H)  As above    3. Colitis, cytomegalovirus (H)  Resolved, as above    4. Heart replaced by transplant (H)  Stable post transplant.  I did review the heart echo December 2019 with ejection fraction 60-65%.  No heart valve problems.  Normal pulmonary artery pressure no pericardial effusion.    EKG was last done October 2019 at [facility].    Immunosuppression managed by the transplant team.    He does have an appointment next week for cardiac catheterization for routine monitoring.  He has not had any new chest pain.    He is on Crestor 10 mg a day and aspirin 81 mg a day.    Labs from January 16, 2020 reviewed by me today.  Creatinine 1.1.  Potassium 4.7.  Normal liver test.  Hemoglobin increased to 10.2.  White count 3.3.  Platelets 276    You will continue to have close monitoring to the transplant team [facility].  We discussed doing a 6-month or routine follow-up this fall to review issues, otherwise his medical care is mainly cared for to the transplant team and gastroenterology now.    5. Hypothyroidism, unspecified type  Synthroid, he takes a half dose 1 day a week since low TSH in October.    I did  "review labs from January 16 of this year with TSH of 0.4.    6. Mild intermittent asthma without complication  Well-controlled with asthma control test today of 25, no flare.  Continue Singulair and albuterol as needed.    Patient had a recent right C3 shingles, just mild postherpetic neuralgia.  He finished Valtrex.  No open lesions.    Surgical Procedure Risk: Low (reported cardiac risk generally < 1%)  Have you had prior anesthesia?: Yes  Have you or any family members had a previous anesthesia reaction:  Yes: When given Codeine prior, was not able to \"go under\"  Do you or any family members have a history of a clotting or bleeding disorder?: No  Cardiac Risk Assessment: no increased risk for major cardiac complications    APPROVAL GIVEN to proceed with proposed procedure, without further diagnostic evaluation    Please Note:  Heart transplant patient.    Functional Status: Independent  Patient plans to recover at home with family.     Subjective:      Everton Larios is a 54 y.o. male who presents for a preoperative consultation.  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 tach.  Underwent cardiac transplant with success.    See cardiac echo from December 2019.  He does have a routine cardiac catheterization coming up next week planned.    He does continue on low-dose aspirin and Crestor 10 mg.  He is not had any chest pain or lower extremity edema or increasing shortness of breath.    Good exercise tolerance with regular walking.    In December he had some left knee and Achilles pain but that is better.    Hypertension has been well controlled with amlodipine 5 mg a day.  No orthostasis.    Late last year he had C. difficile and CMV colitis.  That is resolved.  Bowels are regular now.  Ulcer colitis is under good control with balsalazide.    No cough or increasing shortness of breath.  Previous Pseudomonas pneumonia and pulmonary " aspergillosis.    History of asthma but has not been flaring since transplant.  Not needing increased inhalers currently.    Hypothyroidism with low TSH in October but TSH check in January was normal, will continue on just 1/2 tablet of Synthroid 1 day a week otherwise 100 mcg daily.    Patient had an elevated alkaline phosphatase.  December 2019 MRCP showed some heterogenicity in his bile system, follow-up six-month MRCP planned.  His liver tests were normal in January except for mildly elevated alkaline phosphatase.    No history of seizure or DVT.    Does not have sleep apnea.    All other systems reviewed and are negative, other than those listed in the HPI.    Pertinent History  Do you have difficulty breathing or chest pain after walking up a flight of stairs: No  History of obstructive sleep apnea: No  Steroid use in the last 6 months: No  Frequent Aspirin/NSAID use: Yes: Aspirin 81 mg daily  Prior Blood Transfusion: Yes: Several  Prior Blood Transfusion Reaction: No  If for some reason prior to, during or after the procedure, if it is medically indicated, would you be willing to have a blood transfusion?:  There is no transfusion refusal.    Current Outpatient Medications   Medication Sig Dispense Refill     acetaminophen (TYLENOL) 325 MG tablet Take 650 mg by mouth every 4 (four) hours as needed for pain.              albuterol (PROAIR HFA;PROVENTIL HFA;VENTOLIN HFA) 90 mcg/actuation inhaler Inhale 2 puffs as needed.              alendronate (FOSAMAX) 70 MG tablet Take 1 tablet by mouth once a week.  3     amLODIPine (NORVASC) 5 MG tablet Take 5 mg by mouth daily.  0     aspirin 81 mg chewable tablet Chew 81 mg daily.       balsalazide (COLAZAL) 750 mg capsule 2 capsules twice daily  5     calcium carbonate-vit D3-min 600 mg calcium- 400 unit Tab Take 1 tablet by mouth 2 (two) times a day with meals.              DULoxetine (CYMBALTA) 20 MG capsule Take 1 capsule (20 mg total) by mouth daily. 90 capsule 3      levothyroxine (SYNTHROID, LEVOTHROID) 100 MCG tablet TAKE 1 TABLET BY MOUTH EVERY DAY 90 tablet 2     melatonin 3 mg Tab tablet Take 6 mg by mouth at bedtime as needed.       montelukast (SINGULAIR) 10 mg tablet TAKE 1 TABLET BY MOUTH AT BEDTIME 90 tablet 2     multivitamin with minerals (THERA M PLUS, FERROUS FUMARAT,) 9 mg iron-400 mcg Tab tablet Take 1 tablet by mouth daily.       mycophenolate (CELLCEPT) 250 mg capsule Take 1,500 mg by mouth 2 (two) times a day.              omeprazole (PRILOSEC) 40 MG capsule Take 40 mg by mouth every evening.       rosuvastatin (CRESTOR) 10 MG tablet Take 10 mg by mouth daily.       senna-docusate (PERICOLACE) 8.6-50 mg tablet Take 2 tablets by mouth 2 (two) times a day as needed.              tacrolimus (PROGRAF) 1 MG capsule Take 3 capsules (3 mg) in the morning and 3 and a half capsules (3.5 mg) in the evening.       calcium, as carbonate, (TUMS) 200 mg calcium (500 mg) chewable tablet Chew 1 tablet daily as needed for heartburn.       cetirizine (ZYRTEC) 10 MG tablet Take 10 mg by mouth daily.       fluticasone propionate (FLOVENT HFA) 220 mcg/actuation inhaler Inhale 2 puffs 2 (two) times a day.       polyethylene glycol (MIRALAX) 17 gram packet Take 17 g by mouth as needed.       VENTOLIN HFA 90 mcg/actuation inhaler TAKE 2 PUFFS BY MOUTH EVERY 6 HOURS AS NEEDED FOR WHEEZE 18 Inhaler 0     No current facility-administered medications for this visit.         Allergies   Allergen Reactions     Adhesive Tape-Silicones      Other reaction(s): Blisters  Tegaderm causes bruises, blisters and extreme irritation.     Hydromorphone Other (See Comments)     Significant Delirium     Lisinopril Other (See Comments)     hypotension     Codeine Nausea Only     nausea       Patient Active Problem List   Diagnosis     Osteopenia     Hypothyroidism     Non-ischemic cardiomyopathy (H)     Pierce's Esophagus     Ulcerative Colitis     Paroxysmal Atrial Fibrillation     Atrial Flutter      Ventricular Tachycardia     ASD (atrial septal defect)     Primary osteoarthritis involving multiple joints     ICD (implantable cardioverter-defibrillator), biventricular, in situ     Chronic seasonal allergic rhinitis     Acute on chronic systolic congestive heart failure (H)     Community acquired pneumonia, unspecified laterality     Mild persistent asthma without complication     Dysthymia     Systolic CHF, chronic (H)     Medication monitoring encounter     Heart replaced by transplant (H)     Pulmonary nodules     Sepsis due to Pseudomonas aeruginosa (H)       Past Medical History:   Diagnosis Date     Arthritis     hands, neck     ASD (atrial septal defect)     s/p repair age 3     Asthma      Atrial fibrillation (H)      Pierce's esophagus      Cataract      CHF (congestive heart failure) (H)      Clotting disorder (H)      Congenital cardiomyopathy in  (H)      Depression      DJD (degenerative joint disease)     neck     History of anesthesia complications     nausea     History of transfusion      Hypothyroidism      ICD (implantable cardioverter-defibrillator) in place      Pacemaker      Ulcerative colitis (H)      Ventricular tachycardia (H)        Past Surgical History:   Procedure Laterality Date     ABLATION OF DYSRHYTHMIC FOCUS      for A fib     ASD REPAIR  1968     CARDIAC DEFIBRILLATOR PLACEMENT      x2--last was 2018     COLONOSCOPY N/A 2015    Procedure: COLONOSCOPY with biopsy;  Surgeon: Fernie Akers MD;  Location: [facility];  Service:      COLONOSCOPY N/A 2017    Procedure: COLONOSCOPY with biopsies and polypectomies using snare;  Surgeon: Gael Romero MD;  Location: [facility];  Service:      ESOPHAGOGASTRODUODENOSCOPY N/A 2017    Procedure: ESOPHAGOGASTRODUODENOSCOPY (EGD) with biopsies;  Surgeon: Gael Romero MD;  Location: [facility];  Service:      HAND SURGERY Left      LIPOMA RESECTION      x2     DC REVISE MEDIAN N/CARPAL  TUNNEL SURG  2016    Procedure: OPEN RIGHT CARPAL TUNNEL RELEASE CORTISONE INJECTION RIGHT THUMB;  Surgeon: Duong Santoyo MD;  Location: [facility];  Service: Orthopedics       Social History     Socioeconomic History     Marital status: Single     Spouse name: Not on file     Number of children: Not on file     Years of education: Not on file     Highest education level: Not on file   Occupational History     Not on file   Social Needs     Financial resource strain: Not on file     Food insecurity:     Worry: Not on file     Inability: Not on file     Transportation needs:     Medical: Not on file     Non-medical: Not on file   Tobacco Use     Smoking status: Former Smoker     Packs/day: 1.00     Years: 25.00     Pack years: 25.00     Last attempt to quit: 2008     Years since quittin.2     Smokeless tobacco: Never Used   Substance and Sexual Activity     Alcohol use: Yes     Alcohol/week: 1.0 - 2.0 standard drinks     Types: 1 - 2 Standard drinks or equivalent per week     Frequency: 2-4 times a month     Drinks per session: 1 or 2     Drug use: No     Sexual activity: Not on file   Lifestyle     Physical activity:     Days per week: Not on file     Minutes per session: Not on file     Stress: Not on file   Relationships     Social connections:     Talks on phone: Not on file     Gets together: Not on file     Attends Oriental orthodox service: Not on file     Active member of club or organization: Not on file     Attends meetings of clubs or organizations: Not on file     Relationship status: Not on file     Intimate partner violence:     Fear of current or ex partner: Not on file     Emotionally abused: Not on file     Physically abused: Not on file     Forced sexual activity: Not on file   Other Topics Concern     Not on file   Social History Narrative    He works part-time at [employer].       Patient Care Team:  Fredrick Wolff MD as PCP - General  Fredrick Wolff MD as Assigned  "PCP          Objective:     Vitals:    02/04/20 1434   BP: 126/80   Pulse: (!) 104   Weight: 157 lb 1.6 oz (71.3 kg)   Height: 5' 7.75\" (1.721 m)         Physical Exam:  Alert and oriented x3 with good mood and affect.  Pupils and irises equal and reactive.  No jaundice.  Pharynx is widely patent without thrush or inflammation.  Teeth in good condition.  No cervical or supraclavicular adenopathy.  No JVD and no carotid bruits.  Lungs are clear to auscultation with good respiratory excursion and no wheezing or crackles or dullness.  Heart is regular with tachycardia, baseline tachycardia since transplant, no S3 or S4 or rub.  No ankle edema.  Abdomen is nontender no hepatosplenomegaly or pulsatile mass.  Neurologic exam is normal.  Mobility exam is normal.    Patient Instructions   Plan to stop aspirin 1 week before your colonoscopy and restart aspirin day after colonoscopy if no major bleeding.    Determine the findings of your cardiac catheterization, prior to stopping the aspirin, however.    Stop the multivitamin with minerals now, restart after the colonoscopy.    Continue your other medications as you are currently taking them, including the morning of the colonoscopy.  You can take your morning medications with a sip of water that day.    Continue to follow closely with the transplant team.  You can consider a follow-up appointment with me in the fall in approximately 6 months, or you can consider changing your follow-up plan depending on situation.      EKG: EKG was done October 2019, results on care everywhere    Labs: See labs from January 16, 2020  No results found for this or any previous visit (from the past 240 hour(s)).    Immunization History   Administered Date(s) Administered     Influenza, inj, historic,unspecified 10/22/2008, 09/25/2009, 11/07/2011     Influenza, seasonal,quad inj 6-35 mos 11/30/2012, 10/02/2014     Influenza,seasonal quad, PF, =/> 6months 10/02/2015     Influenza,seasonal,quad " inj =/> 6months 09/12/2016, 10/16/2017, 09/20/2018, 09/20/2019     Pneumo Conj 13-V (2010&after) 09/26/2018     Pneumo Polysac 23-V 09/28/2019     Tdap 07/20/2012           Electronically signed by Fredrick Wolff MD 02/04/20 2:25 PM

## 2021-06-06 NOTE — PROGRESS NOTES
ASSESSMENT:  I certify that Everton Larios meets standards, but will need priodic monitoring due to cardiovascular issues. 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  Pt instructed to contact cardiology to have note to add to this examination.    PLAN:  Return to medical examiner's office for follow up on Return in about 1 year (around 3/9/2021) for Next scheduled follow up..    There are no Patient Instructions on file for this visit.    VISION SCREENING:  Acuity  Right eye uncorrected:20/32  Left eye uncorrected: 20/50  Both eyes uncorrected: 20/25  Horizontal field of vision:   Right eye: yes >80  Left eye: yes >80    Applicant can recognize and distinguish among traffic control signals and devices showing standard red, green, and dilia colors? yes    Applicant meets visual acuity requirement only when wearing not applicable    Monocular vision: no    HEARING:  Hearing aid used for tests: no  Hearing aid required to meet standard: no    Distance from which individual at which forced whispered voice can first be heard:  Right ear: 5 ft.  Left ear: 5 ft.       CHIEF COMPLAINT  DOT Physical Exam    HISTORY OF PRESENT ILLNESS:  Everton Larios is a 54 y.o. 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 Hx of cardiac surgery and cleared by cardiology.  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      REVIEW OF SYSTEMS:  General: Negative for overweight, tremor, signs of alcoholism, alcohol or drug abuse.  HEENT: Negative for retinopathy, cataracts, aphakia, glaucoma, or macular degeneration. Negative for scarring of tympanic membrane, occlusion of external ear canals, or perforated eardrums. Negative for irremediable deformities likely to interfere with breathing or swallowing.  Cardiovascular: Negative for heart murmurs, extra sounds, enlarged heart, edema, pacemaker, or implantable defibrillator.   Respiratory:  Negative for abnormal chest wall expansion, abnormal respiratory rate, abnormal breath sounds, impaired respiratory function, or cyanosis.   Gastrointestinal: Negative for enlarged liver, enlarged spleen, masses, bruits, hernia, or significant abdominal wall muscle weakness.  Vascular: Negative for abnormal pulse and amplitude, carotid or arterial bruits, or varicose veins.   Genitourinary: Negative for hernias.  Musculoskeletal: Negative for loss or impairment of leg, foot, toe, arm, hand, finger, perceptible limp, deformities, atrophy, weakness, insufficient lower limb mobility, insufficient grasp in upper limb to maintain steering wheel , previous surgery, deformities, limitation of motion, or tenderness.   Neurological: Negative for impaired equilibrium, paralysis, coordination or speech impairement, asymmetric deep tendon reflexes, sensory or positional abnormalities, abnormal patellar and Babinski's reflexes, or ataxia.     All other systems negative.    PFSH:  Past Medical History:   Diagnosis Date     Arthritis     hands, neck     ASD (atrial septal defect)     s/p repair age 3     Asthma      Atrial fibrillation (H)      Pierce's esophagus      Cataract      CHF (congestive heart failure) (H)      Clotting disorder (H)      Congenital cardiomyopathy in  (H)      Depression      DJD (degenerative joint disease)     neck     History of anesthesia complications     nausea     History of transfusion      Hypothyroidism      ICD (implantable cardioverter-defibrillator) in place      Pacemaker      Ulcerative colitis (H)      Ventricular tachycardia (H)      Past Surgical History:   Procedure Laterality Date     ABLATION OF DYSRHYTHMIC FOCUS      for A fib     ASD REPAIR  1968     CARDIAC DEFIBRILLATOR PLACEMENT      x2--last was 2018     COLONOSCOPY N/A 2015    Procedure: COLONOSCOPY with biopsy;  Surgeon: Fernie Akers MD;  Location: [facility];  Service:      COLONOSCOPY N/A 2017     Procedure: COLONOSCOPY with biopsies and polypectomies using snare;  Surgeon: Gael Romero MD;  Location: [facility];  Service:      ESOPHAGOGASTRODUODENOSCOPY N/A 2017    Procedure: ESOPHAGOGASTRODUODENOSCOPY (EGD) with biopsies;  Surgeon: Gael Romero MD;  Location: [facility];  Service:      HAND SURGERY Left      LIPOMA RESECTION      x2     TN REVISE MEDIAN N/CARPAL TUNNEL SURG  2016    Procedure: OPEN RIGHT CARPAL TUNNEL RELEASE CORTISONE INJECTION RIGHT THUMB;  Surgeon: Duong Santoyo MD;  Location: [facility];  Service: Orthopedics     Family History   Problem Relation Age of Onset     Hypertension Father      Endometrial cancer Mother      Endometrial cancer Maternal Grandmother      Social History     Socioeconomic History     Marital status: Single     Spouse name: Not on file     Number of children: Not on file     Years of education: Not on file     Highest education level: Not on file   Occupational History     Not on file   Social Needs     Financial resource strain: Not on file     Food insecurity     Worry: Not on file     Inability: Not on file     Transportation needs     Medical: Not on file     Non-medical: Not on file   Tobacco Use     Smoking status: Former Smoker     Packs/day: 1.00     Years: 25.00     Pack years: 25.00     Last attempt to quit: 2008     Years since quittin.3     Smokeless tobacco: Never Used   Substance and Sexual Activity     Alcohol use: Yes     Alcohol/week: 1.0 - 2.0 standard drinks     Types: 1 - 2 Standard drinks or equivalent per week     Frequency: 2-4 times a month     Drinks per session: 1 or 2     Drug use: No     Sexual activity: Not on file   Lifestyle     Physical activity     Days per week: Not on file     Minutes per session: Not on file     Stress: Not on file   Relationships     Social connections     Talks on phone: Not on file     Gets together: Not on file     Attends [employer] service: Not on file      "Active member of club or organization: Not on file     Attends meetings of clubs or organizations: Not on file     Relationship status: Not on file     Intimate partner violence     Fear of current or ex partner: Not on file     Emotionally abused: Not on file     Physically abused: Not on file     Forced sexual activity: Not on file   Other Topics Concern     Not on file   Social History Narrative    He works part-time at [employer].       PHYSICAL EXAM:  Vitals:    03/09/20 1012   BP: 130/66   Patient Site: Right Arm   Patient Position: Sitting   Cuff Size: Adult Regular   Pulse: (!) 104   SpO2: 100%   Weight: 163 lb 11.2 oz (74.3 kg)   Height: 5' 7.75\" (1.721 m)     Body mass index is 25.07 kg/m .        Physical Exam:  General Appearance: Alert, cooperative, no distress, appears stated age   Head: Normocephalic, without obvious abnormality, atraumatic  Eyes: PERRL, conjunctiva/corneas clear, EOM's intact   Ears: Normal TM's and external ear canals, both ears  Nose:Nares normal, septum midline,mucosa normal, no drainage    Throat:Lips, mucosa, and tongue normal; teeth and gums normal  Neck: Supple, symmetrical, trachea midline, no adenopathy;  thyroid: not enlarged, symmetric, no tenderness/mass/nodules  Back: Symmetric, no curvature, ROM normal,  Lungs: Clear to auscultation bilaterally, respirations unlabored  Heart: Regular rate and rhythm, S1 and S2 normal, no murmur, rub, or gallop  Abdomen: Soft, non-tender, bowel sounds active all four quadrants,  no masses, no organomegaly  Extremities: Extremities normal, atraumatic, no cyanosis or edema  Skin: Skin color, texture, turgor normal, no rashes or lesions  Lymph nodes: Cervical, supraclavicular, and axillary nodes normal  Neurologic: Normal                        "

## 2021-06-08 NOTE — PROGRESS NOTES
Office Visit - Follow Up   Everton Larios   51 y.o. male    Date of Visit: 1/3/2017    Chief Complaint   Patient presents with     Sore Throat     x 2 days. Sxs: sore throat, rt ear ache, facial congestion, chest congestion, etc. No fever        Assessment and Plan   1. Sore throat  Strep screen is negative.  Treat if would come back positive for strep.  - Rapid Strep A Screen-Throat  - Group A Strep, RNA Direct Detection, Throat    2. Upper respiratory tract infection, unspecified type  Likely viral in nature given the symptoms.  Rest and fluids.  If things are not improving or other worsening I did give him some amoxicillin.  He should notify his anticoagulation nurse if he would start taking this medication.  I've urged him not to utilize antibiotics unless absolutely necessary however.  He'll be following up with Dr. Wolff next week as planned.    3. ASD (atrial septal defect)    - INR    4. Cardiomyopathy    - INR    5. Paroxysmal atrial fibrillation    - INR    6. Atrial flutter    - INR        No Follow-up on file.     History of Present Illness   This 51 y.o. old patient of Dr. Wolff's comes in as an urgent add-on for evaluation of upper respiratory infection symptoms.  He's been sick only 2 days.  He thinks he may be a little better today.  Sore throat.  Ear pain on the right.  Facial discomfort little bit of a cough.  Perhaps a low-grade temp but no documentation of that.  He has multiple underlying medical problems including a cardiomyopathy.  He is on Coumadin.     Review of Systems: A comprehensive review of systems was negative except as noted.     Medications, Allergies and Problem List   Reviewed and updated     Physical Exam   General Appearance:   Pleasant gentleman does not appear acutely ill.  Ears are normal bilaterally.  Conjunctivae are not injected no purulent nasal discharge.  Oral pharynx is mildly erythematous but without exudate.  Neck is supple without adenopathy.  Lungs are clear  "except for a few faint expiratory wheezes.    Visit Vitals     /64 (Patient Site: Right Arm, Patient Position: Sitting, Cuff Size: Adult Large)     Pulse 72     Temp 98.8  F (37.1  C)     Ht 5' 8\" (1.727 m)     Wt 186 lb (84.4 kg)     SpO2 98%     BMI 28.28 kg/m2            Additional Information   Current Outpatient Prescriptions   Medication Sig Dispense Refill     amiodarone (PACERONE) 200 MG tablet TAKE 1 TABLET BY MOUTH EVERY DAY 90 tablet 1     balsalazide (COLAZAL) 750 mg capsule Take 2 capsules by mouth 3 (three) times a day.  3     carvedilol (COREG) 12.5 MG tablet TAKE 1 TABLET BY MOUTH TWICE DAILY 180 tablet 1     DULoxetine (CYMBALTA) 20 MG capsule TAKE 1 CAPSULE BY MOUTH ONCE DAILY 90 capsule 3     fluticasone (FLONASE) 50 mcg/actuation nasal spray USE 2 SPRAYS IN EACH NOSTRIL ONCE DAILY 16 g 5     furosemide (LASIX) 80 MG tablet Take 1 tablet in the morning and one half in the afternoon (Patient taking differently: Take 80 mg by mouth 2 (two) times a day at 9am and 6pm. Take 1 tablet in the morning and one half in the afternoon) 180 tablet 1     levothyroxine (SYNTHROID, LEVOTHROID) 50 MCG tablet TAKE 1 TABLET BY MOUTH EVERY DAY 90 tablet 3     lisinopril (PRINIVIL,ZESTRIL) 5 MG tablet Take 5 mg by mouth daily.        omeprazole (PRILOSEC) 20 MG capsule TAKE 1 CAPSULE BY MOUTH ONCE DAILY 90 capsule 3     senna-docusate (SENNOSIDES-DOCUSATE SODIUM) 8.6-50 mg tablet Take 1 tablet by mouth 2 (two) times a day as needed for constipation.  0     tamsulosin (FLOMAX) 0.4 mg Cp24 TAKE 1 CAPSULE BY MOUTH DAILY AT SUPPER 30 capsule 11     warfarin (COUMADIN) 5 MG tablet TAKE 1 TABLET BY MOUTH DAILY OR AS DIRECTED 90 tablet 1     amoxicillin (AMOXIL) 500 MG capsule Take 1 capsule (500 mg total) by mouth 3 (three) times a day for 7 days. 21 capsule 0     No current facility-administered medications for this visit.      Allergies   Allergen Reactions     Codeine      nausea     Social History   Substance Use " Topics     Smoking status: Former Smoker     Packs/day: 1.00     Years: 25.00     Quit date: 11/6/2008     Smokeless tobacco: Never Used     Alcohol use No       Review and/or order of clinical lab tests:  Review and/or order of radiology tests:  Review and/or order of medicine tests:  Discussion of test results with performing physician:  Decision to obtain old records and/or obtain history from someone other than the patient:  Review and summarization of old records and/or obtaining history from someone other than the patient and.or discussion of case with another health care provider:  Independent visualization of image, tracing or specimen itself:    Time: total time spent with the patient was 15 minutes of which >50% was spent in counseling and coordination of care     Weston Price MD

## 2021-06-08 NOTE — TELEPHONE ENCOUNTER
Call placed to Dr Donis requesting lab orders.  [phone]  The message will be sent to the Cardiology team to fax orders to the [facility] lab.

## 2021-06-08 NOTE — PROGRESS NOTES
[facility] Follow Up Note    Everton Larios   51 y.o. male    Date of Visit: 1/11/2017    Chief Complaint   Patient presents with     Follow-up     still has cold, discuss urinary concerns     Subjective  Everton is here for follow-up on her URI was seen on January 3.  He believes he got from his niece, was also sick over the holidays with URI.  Strep was negative.  He was given amoxicillin for one week, just finished that.  He is much better now, still with a slight cough and postnasal drip.  No fevers or shortness of breath.    With this URI episode, he had a relapse of his difficulty voiding issue.  He's had suspected possible prostatitis in March and other episodes last year.  It did clear up with doxycycline use after a month.  Still on Flomax.  His PSA was 0.3 in November of last year.  His urination is starting to improve now since last week.  No dysuria or hematuria or flank pain or fever.    He does have a past history of ulcerative colitis, that is been in remission for many years.  Still on Balsalazide.    He's had some slight looser stools with the amoxicillin, but not full diarrhea no blood in stool.  Some mild lower abdominal bloating sensation over the past week, but better this week.    No increased lower extremity edema.  Denies lightheaded dizzy feeling or lightheadedness.  Weight stable.  He does run a baseline low blood pressure in the 90s to 100s over 60s with his congestive heart failure.    He has a congenital CHF condition.  Last ejection fraction was 35% in January of last year.  No firing of his AICD.  Has a cardiac resynchronization pacemaker.    He had elevated creatinine, without evidence of obstruction on renal ultrasound March 2016.  He did have some scarring of the left renal cortex.    He does have a past history of mild intermittent asthma, slight exacerbation of wheezing last week with the URI, but better now.  He does not have an inhaler, and he did request an  "albuterol inhaler for when necessary use.    He is on Coumadin for paroxysmal atrial fibrillation.  No bleeding issues.  INR was 2.1 on .    PMHx:    Past Medical History   Diagnosis Date     Atrial fibrillation      Pierce's esophagus      CHF (congestive heart failure)      Congenital cardiomyopathy in       History of anesthesia complications      nausea     History of transfusion      Hypothyroidism      ICD (implantable cardioverter-defibrillator) in place      Pacemaker      Ulcerative colitis      Ventricular tachycardia      PSHx:    Past Surgical History   Procedure Laterality Date     Cardiac defibrillator placement       Hand surgery Left      Cardiac surgery       Lipoma resection       x2     Colonoscopy N/A 2015     Procedure: COLONOSCOPY with biopsy;  Surgeon: Fernie Akers MD;  Location: [facility];  Service:      Asd repair  1968     Pr revise median n/carpal tunnel surg  2016     Procedure: OPEN RIGHT CARPAL TUNNEL RELEASE CORTISONE INJECTION RIGHT THUMB;  Surgeon: Duong Santoyo MD;  Location: [facility] OR;  Service: Orthopedics     Immunizations:   Immunization History   Administered Date(s) Administered     Influenza, inj, historic 10/22/2008, 2009, 2011     Influenza, seasonal,quad inj 36+ mos 2016     Influenza, seasonal,quad inj 6-35 mos 2012, 10/02/2014     Influenza,seasonal quad, PF, 36+MOS 10/02/2015     Tdap 2012       ROS A comprehensive review of systems was performed and was otherwise negative    Medications, allergies, and problem list were reviewed and updated    Exam  Visit Vitals     BP 94/64     Pulse 69     Ht 5' 8\" (1.727 m)     Wt 185 lb (83.9 kg)     SpO2 99%     BMI 28.13 kg/m2     Lungs are clear.  No wheezing or crackles or dullness.  Heart is regular with pacemaker.  No murmur.  No ankle edema.  Abdomen is nondistended, just some mild discomfort bloating feeling with mild discomfort in the suprapubic " area.    Assessment/Plan  1. URI (upper respiratory infection)  Consistent with viral URI.  Has completed amoxicillin course.  Nearly resolved.  Follow-up with relapse symptoms.    2. Mild intermittent asthma  Minor exacerbation last week, better now.  He did request albuterol inhaler for when necessary future use.  - albuterol (PROVENTIL HFA;VENTOLIN HFA) 90 mcg/actuation inhaler; Inhale 2 puffs every 6 (six) hours as needed for wheezing.  Dispense: 1 Inhaler; Refill: 3    3. Chronic systolic CHF (congestive heart failure), NYHA class 2  Still compensated.  Weight stable and no edema.  Runs a low blood pressure baseline.  She knew current carvedilol, Lasix 80 mg twice a day, lisinopril 5 mg daily.    He may benefit from a lower dose furosemide if creatinine significantly elevated again today.  Has benefited from reduction of lisinopril dosing earlier with his low blood pressure and worsening creatinine.    4. CRI (chronic renal insufficiency), stage 3 (moderate)  Creatinine did return to baseline with a creatinine 1.2 in November.  Possible exacerbation of condition with recent URI.  May have some volume depletion with a lower blood pressure with illness last week.  May benefit from a low-dose Lasix.  I would like to keep the current dose of lisinopril for afterload reduction of the severe heart failure, stable.    Possible exacerbation of his recurrent prostatitis issue with the URI.  Alternatively, he could have mild exacerbation of his ulcerative colitis with the lower abdominal bloating symptoms.    Recheck kidney profile today, if significant worsening, and like to see him next month or earlier.  If kidney function still okay, if his other symptoms improve, he can go up to 3 months for follow-up.  - Basic Metabolic Panel    5. Prostatitis  Recurrent symptoms, likely precipitated by the URI.  Restart doxycycline.  Will check an INR next week because of the antibiotic.    Refer to urology for further  evaluation.  - Ambulatory referral to Urology  - doxycycline (ADOXA) 100 MG tablet; Take 1 tablet (100 mg total) by mouth 2 (two) times a day for 14 days.  Dispense: 28 tablet; Refill: 1    Ulcerative colitis, possible mild exacerbation as above.  Looser stools likely associated with diet changes with acute illness and the amoxicillin.  I recommended daily yogurt.  Continue the Balsalazide.      Return in about 3 months (around 4/11/2017) for Recheck.   There are no Patient Instructions on file for this visit.  Fredrick Wolff MD  Total time with patient over 25 minutes and over 50% coord care.  Time all face to face.  The following high BMI interventions were performed this visit: encouragement to exercise    Current Outpatient Prescriptions   Medication Sig Dispense Refill     amiodarone (PACERONE) 200 MG tablet TAKE 1 TABLET BY MOUTH EVERY DAY 90 tablet 1     balsalazide (COLAZAL) 750 mg capsule Take 2 capsules by mouth 3 (three) times a day.  3     carvedilol (COREG) 12.5 MG tablet TAKE 1 TABLET BY MOUTH TWICE DAILY 180 tablet 1     DULoxetine (CYMBALTA) 20 MG capsule TAKE 1 CAPSULE BY MOUTH ONCE DAILY 90 capsule 3     fluticasone (FLONASE) 50 mcg/actuation nasal spray USE 2 SPRAYS IN EACH NOSTRIL ONCE DAILY 16 g 5     furosemide (LASIX) 80 MG tablet Take 1 tablet in the morning and one half in the afternoon (Patient taking differently: Take 80 mg by mouth 2 (two) times a day at 9am and 6pm. Take 1 tablet in the morning and one half in the afternoon) 180 tablet 1     levothyroxine (SYNTHROID, LEVOTHROID) 50 MCG tablet TAKE 1 TABLET BY MOUTH EVERY DAY 90 tablet 3     lisinopril (PRINIVIL,ZESTRIL) 5 MG tablet Take 5 mg by mouth daily.        omeprazole (PRILOSEC) 20 MG capsule TAKE 1 CAPSULE BY MOUTH ONCE DAILY 90 capsule 3     senna-docusate (SENNOSIDES-DOCUSATE SODIUM) 8.6-50 mg tablet Take 1 tablet by mouth 2 (two) times a day as needed for constipation.  0     tamsulosin (FLOMAX) 0.4 mg Cp24 TAKE 1 CAPSULE BY  MOUTH DAILY AT SUPPER 30 capsule 11     warfarin (COUMADIN) 5 MG tablet TAKE 1 TABLET BY MOUTH DAILY OR AS DIRECTED 90 tablet 1     albuterol (PROVENTIL HFA;VENTOLIN HFA) 90 mcg/actuation inhaler Inhale 2 puffs every 6 (six) hours as needed for wheezing. 1 Inhaler 3     doxycycline (ADOXA) 100 MG tablet Take 1 tablet (100 mg total) by mouth 2 (two) times a day for 14 days. 28 tablet 1     No current facility-administered medications for this visit.      Allergies   Allergen Reactions     Codeine      nausea     Social History   Substance Use Topics     Smoking status: Former Smoker     Packs/day: 1.00     Years: 25.00     Quit date: 11/6/2008     Smokeless tobacco: Never Used     Alcohol use No

## 2021-06-08 NOTE — PROGRESS NOTES
[facility] Follow Up Note    Everton Larios   51 y.o. male    Date of Visit: 2/15/2017    Chief Complaint   Patient presents with     Cough     nasal congestion, thick phlegm, SOB     Subjective  Everton is coming in earlier than planned because of worsening cough with nasal congestion and chest tightness with wheezing.    He has a history of mild intermittent asthma, usually in remission and not needing inhalers.    He developed a URI and early January.  Initially treated with amoxicillin for one week, was getting better when seen on January 11.  Vasotec continued cough, now over the last 2 weeks she's had increasing nasal congestion with drainage, no purulent drainage.  The right ear was somewhat congested last week, but not bothering him today.  His main issue is the increased wheezing and cough and mild chest congestion and he is using his albuterol inhaler more frequently, does give him some benefit.    No fevers.  Minimally productive sputum.  No hemoptysis.    No increased lower extremity edema.  Weight is down 3 pounds.  He is somewhat more lightheaded with low energy.  Still on 80 mg twice a day furosemide.  He has been taking an intermittent lower dose on his own.  He's been having problems of lower blood pressure and higher creatinine.  He is now down on lisinopril to 5 mg a day.    Last week with urology's blood pressure was 106/58, otherwise has not been checking it regularly.    He did have a cystoscopy last week.  It was rather painful for him.  Sometime after that, he passed a hard lump, he was not able to collected.  Suspect bladder stone.  Since that time he's been urinating much better.  No hematuria.  He finished the doxycycline and mid January.  Still on Flomax.  He's been having increased BPH symptoms with intermittent prostatitis symptoms earlier this winter and fall.    His ulcerative colitis is still in remission on Balsalazide.  Still on omeprazole.    No palpitations  "with his paroxysmal atrial fibrillation on Coumadin.  He is on amiodarone for V. tach.  No firing of his AICD.    No chest pain.  CT angiogram in  negative for coronary artery disease.    Hypothyroidism with normal TSH last September on current Synthroid.    PMHx:    Past Medical History:   Diagnosis Date     Atrial fibrillation      Pierce's esophagus      CHF (congestive heart failure)      Congenital cardiomyopathy in       History of anesthesia complications     nausea     History of transfusion      Hypothyroidism      ICD (implantable cardioverter-defibrillator) in place      Pacemaker      Ulcerative colitis      Ventricular tachycardia      PSHx:    Past Surgical History:   Procedure Laterality Date     ASD REPAIR  1968     CARDIAC DEFIBRILLATOR PLACEMENT       CARDIAC SURGERY       COLONOSCOPY N/A 2015    Procedure: COLONOSCOPY with biopsy;  Surgeon: Fernie Akers MD;  Location: [facility];  Service:      HAND SURGERY Left      LIPOMA RESECTION      x2     [place] REVISE MEDIAN N/CARPAL TUNNEL SURG  2016    Procedure: OPEN RIGHT CARPAL TUNNEL RELEASE CORTISONE INJECTION RIGHT THUMB;  Surgeon: Duong Santoyo MD;  Location: [facility] OR;  Service: Orthopedics     Immunizations:   Immunization History   Administered Date(s) Administered     Influenza, inj, historic 10/22/2008, 2009, 2011     Influenza, seasonal,quad inj 36+ mos 2016     Influenza, seasonal,quad inj 6-35 mos 2012, 10/02/2014     Influenza,seasonal quad, PF, 36+MOS 10/02/2015     Tdap 2012       ROS A comprehensive review of systems was performed and was otherwise negative    Medications, allergies, and problem list were reviewed and updated    Exam  Visit Vitals     BP (!) 84/58     Pulse 68     Ht 5' 8\" (1.727 m)     Wt 182 lb (82.6 kg)     SpO2 97%     BMI 27.67 kg/m2     He does not appear acutely ill.  Moderate nasal mucosal congestion, especially on the left.  Tympanic membranes " normal.  Mild postnasal drip pharyngitis.  Some mild expiratory wheezing bilaterally, but no crackles or dullness.  No fairly good respiratory excursion.  No ankle edema.    Assessment/Plan  1. Sinusitis  Symptoms during sinusitis after URI last month.  It likely has set off his asthma.    Restart Flonase.  I recommended saline irrigation.  Antibiotic treatment with Augmentin.      - amoxicillin-clavulanate (AUGMENTIN) 875-125 mg per tablet; Take 1 tablet by mouth 2 (two) times a day for 10 days.  Dispense: 20 tablet; Refill: 0    2. Asthma exacerbation  Add a steroid inhaler.  His pharmacist may need to recommend what is covered with his insurance.  Continue albuterol as needed.  - mometasone (ASMANEX TWISTHALER) 220 mcg (60 doses) inhaler; Inhale 2 puffs daily.  Dispense: 1 each; Refill: 2    Call if worsening shortness of breath.      4. Cardiomyopathy  Blood pressure lower, down 3 pounds from previous.  Suspect some volume depletion with acute illness.  Has been running low.  Continue 5 mg of lisinopril.  But reduce furosemide down to 40 mg twice a day.  Increased weight or increasing shortness of breath or abdominal distention, and go up on Lasix to 80 mg in the morning and 40 at night, or even back to the current 80 mg twice a day if needed.  Follow up later this spring for the heart failure follow-up.    Continues on carvedilol 12.5 mg twice a day.    5. Paroxysmal atrial fibrillation  Regular rhythm on amiodarone.  Has pacemaker with resynchronization therapy.    INR check today.  He was told to recheck an INR late next week, as he will be on antibiotics.    Ulcerative colitis in remission.    Reflux controlled on omeprazole.    Hypothyroidism with normal TSH in September.    C November 30 note for full list of issues.        Return if symptoms worsen or fail to improve.   There are no Patient Instructions on file for this visit.  Fredrick Wolff MD  Total time with patient over 25 minutes and over 50% coord  care.  Time all face to face.  The following high BMI interventions were performed this visit: encouragement to exercise    Current Outpatient Prescriptions   Medication Sig Dispense Refill     albuterol (PROVENTIL HFA;VENTOLIN HFA) 90 mcg/actuation inhaler Inhale 2 puffs every 6 (six) hours as needed for wheezing. 1 Inhaler 3     amiodarone (PACERONE) 200 MG tablet TAKE 1 TABLET BY MOUTH EVERY DAY 90 tablet 1     balsalazide (COLAZAL) 750 mg capsule Take 2 capsules by mouth 3 (three) times a day.  3     carvedilol (COREG) 12.5 MG tablet TAKE 1 TABLET BY MOUTH TWICE DAILY 180 tablet 3     DULoxetine (CYMBALTA) 20 MG capsule TAKE 1 CAPSULE BY MOUTH ONCE DAILY 90 capsule 3     fluticasone (FLONASE) 50 mcg/actuation nasal spray USE 2 SPRAYS IN EACH NOSTRIL ONCE DAILY 16 g 5     furosemide (LASIX) 80 MG tablet Take 1 tablet in the morning and one half in the afternoon (Patient taking differently: Take 80 mg by mouth 2 (two) times a day at 9am and 6pm. Take 1 tablet in the morning and one half in the afternoon) 180 tablet 1     levothyroxine (SYNTHROID, LEVOTHROID) 50 MCG tablet TAKE 1 TABLET BY MOUTH EVERY DAY 90 tablet 3     lisinopril (PRINIVIL,ZESTRIL) 5 MG tablet Take 5 mg by mouth daily.        omeprazole (PRILOSEC) 20 MG capsule TAKE 1 CAPSULE BY MOUTH ONCE DAILY 90 capsule 3     senna-docusate (SENNOSIDES-DOCUSATE SODIUM) 8.6-50 mg tablet Take 1 tablet by mouth 2 (two) times a day as needed for constipation.  0     tamsulosin (FLOMAX) 0.4 mg Cp24 TAKE 1 CAPSULE BY MOUTH DAILY AT SUPPER 30 capsule 11     warfarin (COUMADIN) 5 MG tablet TAKE 1 TABLET BY MOUTH DAILY OR AS DIRECTED 90 tablet 1     amoxicillin-clavulanate (AUGMENTIN) 875-125 mg per tablet Take 1 tablet by mouth 2 (two) times a day for 7 days. 14 tablet 0     mometasone (ASMANEX TWISTHALER) 220 mcg (60 doses) inhaler Inhale 2 puffs daily. 1 each 2     No current facility-administered medications for this visit.      Allergies   Allergen Reactions      Codeine      nausea     Social History   Substance Use Topics     Smoking status: Former Smoker     Packs/day: 1.00     Years: 25.00     Quit date: 11/6/2008     Smokeless tobacco: Never Used     Alcohol use No

## 2021-06-09 ENCOUNTER — RECORDS - HEALTHEAST (OUTPATIENT)
Dept: ADMINISTRATIVE | Facility: CLINIC | Age: 56
End: 2021-06-09

## 2021-06-10 NOTE — PROGRESS NOTES
"I consulted the pharmacist, Ronda Quinonez, regarding this patient's medication and list and the possibility of adding an antihistamine or montelukast.  Response is as below.        \"There is actually an interaction between amiodarone and loratadine. Both amiodarone and loratadine are metabolized by CY isozymes. Additionally, amiodarone is also a CY inhibitor. QT interval prolongation and torsades de pointes has been reported with the coadministration.   Other non-sedating antihistamines, such as cetirizine and fexofenadine, are safe to use and do not interact with amiodarone or any of his other medications. Singulair also does not interact with any of his medications. \"      For this reason, I would like to have him do a month trial of Singulair.  Add a daily antihistamine come July.    "

## 2021-06-10 NOTE — PROGRESS NOTES
Chief complaint: Allergies    History of present illness: This is a pleasant 52-year-old gentleman who is here today for evaluation of allergies.  He states most of his life he has had itchy, watery eyes, runny nose and sneezing as well as a lot of drainage on the back of his throat.  He does use Flonase on a regular basis.  1 spray each nostril twice daily.  He does not use any antihistamines as he takes multiple medications for congestive heart failure and feels that they may interact with the congestive heart failure medications.  He does use nasal rinses regularly as well.  Reports this does seem to help but does not completely resolve the symptoms.  He has not been on Singulair to his knowledge.  He does have a history of mild persistent asthma and was prescribed Asmanex in February.  He states after 1 month he was not able to afford this medication as it was not preferred on his formulary.  It did seem to improve his symptoms.  Lately, he has been using Ventolin every 4 hours due to some shortness of breath and chest tightness.He has not been waking up at night short of breath.  He does have a cough occasionally.    Past medical history: Heart surgery, congestive heart failure, ulcerative colitis, AICD placement, hypothyroidism, atrial fibrillation    Social history: He lives in a home with carpeting it was built in the 1970s, no mold, no pets, former smoker, no central air, no basement    Family history: Brother with asthma and mother and brother with allergies    Review of Systems performed as above and the remainder is negative.      Current Outpatient Prescriptions:      albuterol (PROVENTIL HFA;VENTOLIN HFA) 90 mcg/actuation inhaler, Inhale 2 puffs every 6 (six) hours as needed for wheezing., Disp: 1 Inhaler, Rfl: 3     amiodarone (PACERONE) 200 MG tablet, TAKE 1 TABLET BY MOUTH EVERY DAY, Disp: 90 tablet, Rfl: 1     balsalazide (COLAZAL) 750 mg capsule, Take 2 capsules by mouth 3 (three) times a day.,  Disp: , Rfl: 3     carvedilol (COREG) 12.5 MG tablet, TAKE 1 TABLET BY MOUTH TWICE DAILY, Disp: 180 tablet, Rfl: 3     DULoxetine (CYMBALTA) 20 MG capsule, TAKE 1 CAPSULE BY MOUTH ONCE DAILY, Disp: 90 capsule, Rfl: 3     fluticasone (FLONASE) 50 mcg/actuation nasal spray, USE 2 SPRAYS IN EACH NOSTRIL ONCE DAILY., Disp: 48 g, Rfl: 3     furosemide (LASIX) 80 MG tablet, Take 1 tablet in the morning and one half in the afternoon (Patient taking differently: Take 80 mg by mouth 2 (two) times a day at 9am and 6pm. Take 1 tablet in the morning and one half in the afternoon), Disp: 180 tablet, Rfl: 1     furosemide (LASIX) 80 MG tablet, TAKE 1 TABLET BY MOUTH TWICE DAILY, Disp: 180 tablet, Rfl: 1     levothyroxine (SYNTHROID, LEVOTHROID) 50 MCG tablet, TAKE 1 TABLET BY MOUTH EVERY DAY, Disp: 90 tablet, Rfl: 3     lisinopril (PRINIVIL,ZESTRIL) 5 MG tablet, Take 5 mg by mouth daily. , Disp: , Rfl:      omeprazole (PRILOSEC) 20 MG capsule, TAKE 1 CAPSULE BY MOUTH ONCE DAILY, Disp: 90 capsule, Rfl: 3     omeprazole (PRILOSEC) 20 MG capsule, TAKE 1 CAPSULE BY MOUTH ONCE DAILY, Disp: 90 capsule, Rfl: 0     senna-docusate (SENNOSIDES-DOCUSATE SODIUM) 8.6-50 mg tablet, Take 1 tablet by mouth 2 (two) times a day as needed for constipation., Disp: , Rfl: 0     tamsulosin (FLOMAX) 0.4 mg Cp24, TAKE 1 CAPSULE BY MOUTH DAILY AT SUPPER, Disp: 30 capsule, Rfl: 11     warfarin (COUMADIN) 5 MG tablet, TAKE 1 TABLET BY MOUTH DAILY OR AS DIRECTED, Disp: 90 tablet, Rfl: 0     fluticasone (FLOVENT HFA) 110 mcg/actuation inhaler, Inhale 2 puffs 2 (two) times a day., Disp: 1 Inhaler, Rfl: 2     mometasone (ASMANEX TWISTHALER) 220 mcg (60 doses) inhaler, Inhale 2 puffs daily., Disp: 1 each, Rfl: 2    Allergies   Allergen Reactions     Codeine      nausea       /62  Pulse 72  Wt 182 lb (82.6 kg)  BMI 27.67 kg/m2  Gen: Pleasant male not in acute distress  HEENT: Eyes no erythema of the bulbar or palpebral conjunctiva, no edema. Ears: TMs  well visualized, no effusions. Nose: No congestion, mucosa normal. Mouth: Throat clear, no lip or tongue edema.   Cardiac: Regular rate and rhythm, no murmurs, rubs or gallops  Respiratory: Clear to auscultation bilaterally, no adventitious breath sounds  Lymph: No supraclavicular or cervical lymphadenopathy  Skin: No rashes or lesions  Psych: Alert and oriented times 3    Last Percutaneous Allergy Test Results  Trees  Zbigniew, White  1:20 H  (W/F in mm): 0-0 (04/20/17 1201)  Birch Mix 1:20 H (W/F in mm): 0-0 (04/20/17 1201)  [place], Common 1:20 H (W/F in mm): 0-0 (04/20/17 1201)  Elm, American 1:20 H (W/F in mm): 0-0 (04/20/17 1201)  Red Devil, Shagbark 1:20 H (W/F in mm): 0-0 (04/20/17 1201)  Maple, Hard/Sugar 1:20 H (W/F in mm): 0-0 (04/20/17 1201)  Alto Mix 1:20 H (W/F in mm): 0-0 (04/20/17 1201)  Oak, Red 1:20 H (W/F in mm): 0-0 (04/20/17 1201)  [place], American 1:20 H (W/F in mm): 0-0 (04/20/17 1201)  [place] Tree 1:20 H (W/F in mm): 0-0 (04/20/17 1201)  Dust Mites  D. Pteronyssinus Mite 30,000 AU/ML H (W/F in mm): 0-0 (04/20/17 1201)  D. Farinae Mite 30,000 AU/ML H (W/F in mm: 0-0 (04/20/17 1201)  Grasses  Grass Mix #4 10,000 BAU/ML H: 0-0 (04/20/17 1201)  Melo Grass 1:20 H (W/F in mm): 0-0 (04/20/17 1201)  Cockroach  Cockroach Mix 1:10 H (W/F in mm): 0-0 (04/20/17 1201)  Molds/Fungi  Alternaria Tenuis 1:10 H (W/F in mm): 7-15 (04/20/17 1201)  Aspergillus Fumigatus 1:10 H (W/F in mm): 0-0 (04/20/17 1201)  Homodendrum Cladosporioides 1:10 H (W/F in mm): 0-0 (04/20/17 1201)  Penicillin Notatum 1:10 H (W/F in mm): 0-0 (04/20/17 1201)  Epicoccum 1:10 H (W/F in mm): 0-0 (04/20/17 1201)  Weeds  Ragweed, Short 1:20 H (W/F in mm): 7-15 (04/20/17 1201)  Dock, Sorrel 1:20 H (W/F in mm): 0-0 (04/20/17 1201)  Lamb's Quarter 1:20 H (W/F in mm): 0-0 (04/20/17 1201)  Pigweed, Rough Red Root 1:20 H  (W/F in mm): 0-0 (04/20/17 1201)  Plantain, English 1:20 H  (W/F in mm): 0-0 (04/20/17 1201)  Sagebrush, Mugwort 1:20 H   (W/F in mm): 0-0 (04/20/17 1201)  Animal  Cat 10,000 BAU/ML H (W/F in mm): 0-0 (04/20/17 1201)  Dog 1:10 H (W/F in mm): 0-0 (04/20/17 1201)  Controls  Device Type: QUINTIP (04/20/17 1201)  Neg. control: 50% Glycerine/Saline H (W/F in mm): 0-0 (04/20/17 1201)  Pos. control: Histamine 6mg/ML (W/F in mms): 5-10 (04/20/17 1201)    FENO: 36    Spirometry was performed.  FEV1 to FVC ratio 72%.  FEV1 2.2 L or 61% of predicted.  FVC 3.0 L or 65% of predicted.  Patient did cough quite a bit during the exam so we were unable to obtain reproducible matches    Impression report and plan:  1.  Allergic rhinitis to ragweed and Alternaria  2.  Mild persistent asthma    I would like to add montelukast and perhaps a daily antihistamine during summer and fall.  I would like to review medication interactions with the pharmacist, however.  I would add Flovent to his regimen, 110  g 2 puffs twice daily given his elevated nitric oxide level and spirometry.  After 1 month, breathing test should be repeated.  I will contact him once I hear from the pharmacist.  Aerochamber provided.

## 2021-06-11 NOTE — PROGRESS NOTES
[facility] Follow Up Note    Everton Larios   52 y.o. male    Date of Visit: 6/1/2017    Chief Complaint   Patient presents with     Follow-up     3 mo follow up, ASHUTOSH     You Toscano is here for follow-up of multiple medical problems.  His main issue is a congenital cardiomyopathy with chronic congestive heart failure, nonischemic, CT angiogram in 2011 negative.    He has had recurrent V. tach in the past but not recently on amiodarone.  History of paroxysmal atrial fibrillation but no recent A. fib.  He has a dual-chamber pacemaker with AICD.  Occasional PVC and short runs of nonsustained V. tach noted, otherwise no recurrent arrhythmia or atrial fibrillation noted on pacemaker evaluation earlier this month.    He denies lightheaded dizzy spells and energy levels are good.  He saw cardiology on April 25, no changes in treatment plan and given a one-year follow-up.    Cardiac echo was done, there was some possible discrepancy in ejection fraction, ejection fraction has been 25-35%.  When compared to January 2016 echo, cardiology felt the previous echo was more 30%, and it is similar to the 25% that was calculated on this month's echo.    Hypothyroidism with normal TSH in September and no missed doses of Synthroid.    Energy levels are good.  No flare of his asthma.  Just occasional use of albuterol.  On Asmanex twice a day in addition to Singulair and Allegra.    No flare of his ulcerative colitis, no significant abdominal pain today.  On Balsalazide.    Past history of prostatitis with some difficulty urinating.  Possible bladder stone that he had passed.  Still on Flomax.  He feels like he is urinating well now.  He is using an over-the-counter herbal supplement that he thinks may be helping his prostate.    Renal ultrasound March 2016 with stable findings of left renal cortical scarring, but no hydronephrosis or mass.    Carpal tunnel surgery September 2016, no new complaints.  Still  "working at [employer] and that is going well.  Quite active with regular walking there.    He runs a low blood pressure with his heart failure, but it is stable and he denies any lightheaded dizzy spells.    PMHx:    Past Medical History:   Diagnosis Date     Atrial fibrillation      Pierce's esophagus      CHF (congestive heart failure)      Congenital cardiomyopathy in       History of anesthesia complications     nausea     History of transfusion      Hypothyroidism      ICD (implantable cardioverter-defibrillator) in place      Pacemaker      Ulcerative colitis      Ventricular tachycardia      PSHx:    Past Surgical History:   Procedure Laterality Date     ASD REPAIR  1968     CARDIAC DEFIBRILLATOR PLACEMENT       CARDIAC SURGERY       COLONOSCOPY N/A 2015    Procedure: COLONOSCOPY with biopsy;  Surgeon: Fernie Akers MD;  Location: [facility];  Service:      HAND SURGERY Left      LIPOMA RESECTION      x2     [place] REVISE MEDIAN N/CARPAL TUNNEL SURG  2016    Procedure: OPEN RIGHT CARPAL TUNNEL RELEASE CORTISONE INJECTION RIGHT THUMB;  Surgeon: Duong Santoyo MD;  Location: [facility] OR;  Service: Orthopedics     Immunizations:   Immunization History   Administered Date(s) Administered     Influenza, inj, historic 10/22/2008, 2009, 2011     Influenza, seasonal,quad inj 36+ mos 2016     Influenza, seasonal,quad inj 6-35 mos 2012, 10/02/2014     Influenza,seasonal quad, PF, 36+MOS 10/02/2015     Tdap 2012       ROS A comprehensive review of systems was performed and was otherwise negative    Medications, allergies, and problem list were reviewed and updated    Exam  BP 94/62  Pulse 61  Ht 5' 8\" (1.727 m)  Wt 180 lb (81.6 kg)  SpO2 98%  BMI 27.37 kg/m2  Appears well.  No jaundice.  No pharyngitis or thrush.  Teeth in good condition.  No JVD.  Lungs are clear to auscultation with good respiratory excursion and no wheezing.  Heart is regular with no murmur rub " or gallop.  No S3.  Abdomen is nontender only mildly overweight.  No hepatomegaly.  No ankle edema.    Assessment/Plan  1. Hypothyroidism, unspecified type  Stable, continue Synthroid  - Thyroid Stimulating Hormone (TSH)    2. Paroxysmal atrial fibrillation  No recurrence on amiodarone.  Coumadin.  - INR    3. Chronic systolic CHF (congestive heart failure), NYHA class 2  Well compensated.  Runs a low blood pressure but tolerates.  History of high potassium, no spironolactone.    Continue lisinopril 5 mg a day and carvedilol 12.5 mg a day.    One-year follow-up with cardiology in April next year.    He has been taking Lasix 80 mg in the morning, but 40 mg in the afternoon about half the time.  The other half he takes his usual 80 mg twice daily Lasix dose.    4. History of ventricular tachycardia  No recurrence on amiodarone.    Pulmonary function test May 2016 was normal.  DLCO 80%.  Repeat pulmonary function tests for chronic amiodarone monitoring.    5. ICD (implantable cardioverter-defibrillator), biventricular, in situ  Management per cardiology    6. Medication monitoring encounter    - Comprehensive Metabolic Panel    7. Mild intermittent asthma without complication  No flare.  - PFT Complete; Future    Asmanex, Allegra and Singulair    History of prostatitis, no symptoms of recurrence, or recurrent bladder stones.  Urinating well with Flomax.  Questionable increased creatinine with difficulty voiding previously.  Check creatinine today, follow-up if symptoms develop.    Reflux controlled with omeprazole.    Ulcerative colitis in remission, continue current medication.  Management per GI.    Return in about 4 months (around 10/1/2017).   There are no Patient Instructions on file for this visit.  Fredrick Wolff MD  Total time with patient over 25 minutes and over 50% coord care.  Time all face to face.      Current Outpatient Prescriptions   Medication Sig Dispense Refill     amiodarone (PACERONE) 200 MG  tablet TAKE 1 TABLET BY MOUTH EVERY DAY 90 tablet 0     balsalazide (COLAZAL) 750 mg capsule Take 2 capsules by mouth 3 (three) times a day.  3     carvedilol (COREG) 12.5 MG tablet TAKE 1 TABLET BY MOUTH TWICE DAILY 180 tablet 3     DULoxetine (CYMBALTA) 20 MG capsule TAKE 1 CAPSULE BY MOUTH ONCE DAILY 90 capsule 3     fluticasone (FLONASE) 50 mcg/actuation nasal spray USE 2 SPRAYS IN EACH NOSTRIL ONCE DAILY. 48 g 3     fluticasone (FLOVENT HFA) 110 mcg/actuation inhaler Inhale 2 puffs 2 (two) times a day. 1 Inhaler 2     furosemide (LASIX) 80 MG tablet Take 1 tablet in the morning and one half in the afternoon (Patient taking differently: Take 80 mg by mouth 2 (two) times a day at 9am and 6pm. Take 1 tablet in the morning and one half in the afternoon) 180 tablet 1     furosemide (LASIX) 80 MG tablet TAKE 1 TABLET BY MOUTH TWICE DAILY 180 tablet 1     levothyroxine (SYNTHROID, LEVOTHROID) 50 MCG tablet TAKE 1 TABLET BY MOUTH EVERY DAY 90 tablet 3     lisinopril (PRINIVIL,ZESTRIL) 5 MG tablet Take 5 mg by mouth daily.        montelukast (SINGULAIR) 10 mg tablet Take 1 tablet (10 mg total) by mouth at bedtime. 30 tablet 1     omeprazole (PRILOSEC) 20 MG capsule TAKE 1 CAPSULE BY MOUTH ONCE DAILY 90 capsule 3     senna-docusate (SENNOSIDES-DOCUSATE SODIUM) 8.6-50 mg tablet Take 1 tablet by mouth 2 (two) times a day as needed for constipation.  0     tamsulosin (FLOMAX) 0.4 mg Cp24 TAKE 1 CAPSULE BY MOUTH DAILY AT DINNER 90 capsule 2     VENTOLIN HFA 90 mcg/actuation inhaler INHALE 2 PUFFS EVERY 6 HOURS AS NEEDED FOR WHEEZING 18 g 3     warfarin (COUMADIN) 5 MG tablet TAKE 1 TABLET BY MOUTH DAILY OR AS DIRECTED 90 tablet 0     No current facility-administered medications for this visit.      Allergies   Allergen Reactions     Codeine      nausea     Social History   Substance Use Topics     Smoking status: Former Smoker     Packs/day: 1.00     Years: 25.00     Quit date: 11/6/2008     Smokeless tobacco: Never Used      Alcohol use No

## 2021-06-11 NOTE — PROGRESS NOTES
RESPIRATORY CARE NOTE     Patient Name: vEerton Larios  Today's Date: 6/14/2017     Complete PFT done. Albuterol 2.5 mg neb given. Pt performed tests with good effort. Test results meet ATS criteria. Results scanned into epic. Pt left in no distress.       Cat Mijares

## 2021-06-12 NOTE — TELEPHONE ENCOUNTER
Refill Approved    Rx renewed per Medication Renewal Policy. Medication was last renewed on 9/22/19.    Nelida Johnson, [facility] Triage/Med Refill 10/5/2020     Requested Prescriptions   Pending Prescriptions Disp Refills     DULoxetine (CYMBALTA) 20 MG capsule 90 capsule 3     Sig: Take 1 capsule (20 mg total) by mouth daily.       Tricyclics/Misc Antidepressant/Antianxiety Meds Refill Protocol Passed - 10/1/2020 11:14 AM        Passed - PCP or prescribing provider visit in last year     Last office visit with prescriber/PCP: 12/11/2019 Fredrick Wolff MD OR same dept: 12/30/2019 Rosalino Greer CNP OR same specialty: 12/30/2019 Rosalino Greer CNP  Last physical: 2/4/2020 Last MTM visit: Visit date not found   Next visit within 3 mo: Visit date not found  Next physical within 3 mo: Visit date not found  Prescriber OR PCP: Fredrick Wolff MD  Last diagnosis associated with med order: 1. Dysthymia  - DULoxetine (CYMBALTA) 20 MG capsule; Take 1 capsule (20 mg total) by mouth daily.  Dispense: 90 capsule; Refill: 3    If protocol passes may refill for 12 months if within 3 months of last provider visit (or a total of 15 months).

## 2021-06-12 NOTE — PROGRESS NOTES
Chief complaint: Follow-up asthma    History of present illness: This is a pleasant 52-year-old gentleman who is here today for follow-up of his asthma.  I have not seen him since the spring.  He was to follow-up in a month but failed to do so.  He has been using Flovent 110 micro grams 2 puffs twice daily, however, despite this, he continues to have cough and shortness of breath.  He states it has worsened since the fall.  He does have a history of Alternaria and ragweed allergy.  He has a lot of drainage.  He continues on a daily antihistamine, montelukast and fluticasone nasal spray.  He does use nasal rinses regularly as well.  No nighttime awakenings    Past medical history, social history, family medical history, meds and allergies reviewed and updated accordingly.    Review of Systems performed as above and the remainder is negative.      Current Outpatient Prescriptions:      amiodarone (PACERONE) 200 MG tablet, TAKE 1 TABLET BY MOUTH EVERY DAY, Disp: 90 tablet, Rfl: 1     carvedilol (COREG) 12.5 MG tablet, TAKE 1 TABLET BY MOUTH TWICE DAILY, Disp: 180 tablet, Rfl: 3     carvedilol (COREG) 12.5 MG tablet, TAKE 1 TABLET BY MOUTH TWICE DAILY, Disp: 180 tablet, Rfl: 0     DULoxetine (CYMBALTA) 20 MG capsule, TAKE 1 CAPSULE BY MOUTH ONCE DAILY, Disp: 90 capsule, Rfl: 3     fluticasone (FLONASE) 50 mcg/actuation nasal spray, USE 2 SPRAYS IN EACH NOSTRIL ONCE DAILY., Disp: 48 g, Rfl: 3     furosemide (LASIX) 80 MG tablet, Take 1 tablet in the morning and one half in the afternoon (Patient taking differently: Take 80 mg by mouth 2 (two) times a day at 9am and 6pm. Take 1 tablet in the morning and one half in the afternoon), Disp: 180 tablet, Rfl: 1     levothyroxine (SYNTHROID, LEVOTHROID) 50 MCG tablet, TAKE 1 TABLET BY MOUTH EVERY DAY, Disp: 90 tablet, Rfl: 3     lisinopril (PRINIVIL,ZESTRIL) 5 MG tablet, Take 5 mg by mouth daily. , Disp: , Rfl:      mesalamine (ASACOL HD) 800 mg TbEC EC tablet, TK 3 TS PO BID,  Disp: , Rfl: 1     montelukast (SINGULAIR) 10 mg tablet, Take 1 tablet (10 mg total) by mouth at bedtime., Disp: 90 tablet, Rfl: 3     omeprazole (PRILOSEC) 20 MG capsule, TAKE 1 CAPSULE BY MOUTH ONCE DAILY, Disp: 90 capsule, Rfl: 3     omeprazole (PRILOSEC) 20 MG capsule, TAKE 1 CAPSULE BY MOUTH ONCE DAILY, Disp: 90 capsule, Rfl: 3     potassium chloride SA (K-DUR,KLOR-CON) 20 MEQ tablet, Take 1 tablet (20 mEq total) by mouth daily., Disp: 30 tablet, Rfl: 11     predniSONE (DELTASONE) 10 mg tablet, , Disp: , Rfl: 0     senna-docusate (SENNOSIDES-DOCUSATE SODIUM) 8.6-50 mg tablet, Take 1 tablet by mouth 2 (two) times a day as needed for constipation., Disp: , Rfl: 0     tamsulosin (FLOMAX) 0.4 mg Cp24, TAKE 1 CAPSULE BY MOUTH DAILY AT DINNER, Disp: 90 capsule, Rfl: 2     VENTOLIN HFA 90 mcg/actuation inhaler, INHALE 2 PUFFS EVERY 6 HOURS AS NEEDED FOR WHEEZING, Disp: 18 g, Rfl: 3     warfarin (COUMADIN) 5 MG tablet, TAKE 1 TABLET BY MOUTH DAILY OR AS DIRECTED, Disp: 90 tablet, Rfl: 1     azelastine (ASTELIN) 137 mcg (0.1 %) nasal spray, 2 sprays into each nostril 2 (two) times a day. Use in each nostril as directed, Disp: 30 mL, Rfl: 1     fluticasone (FLOVENT HFA) 220 mcg/actuation inhaler, Inhale 1 puff 2 (two) times a day., Disp: 1 Inhaler, Rfl: 3    Allergies   Allergen Reactions     Codeine      nausea       BP 92/58  Pulse 70  Resp 16  Wt 181 lb (82.1 kg)  BMI 27.52 kg/m2  Gen: Pleasant male not in acute distress  HEENT: Eyes no erythema of the bulbar or palpebral conjunctiva, no edema. Ears: TMs well visualized, no effusions. Nose: No congestion, mucosa normal. Mouth: Throat clear, no lip or tongue edema.   Cardiac: Regular rate and rhythm, no murmurs, rubs or gallops  Respiratory: Clear to auscultation bilaterally, no adventitious breath sounds  Lymph: No supraclavicular or cervical lymphadenopathy  Skin: No rashes or lesions  Psych: Alert and oriented times 3    Spirometry was performed.  FEV1 to FVC  ratio 73%.  FEV1 2.2 L or 61% predicted.  FVC 3.0 L or 64% of predicted.    FENO: 21    Impression report and plan:  1.  Mild persistent asthma  2.  Allergic rhinitis    Increase Flovent to 220 micro grams 2 puffs twice daily.  Follow in 3 months.  Repeat breathing test at that time.  Add Astelin nasal spray 2 sprays each nostril twice daily.

## 2021-06-13 NOTE — PROGRESS NOTES
[facility]    Everton Larios   52 y.o. male    Date of Visit: 10/16/2017    Chief Complaint   Patient presents with     Follow-up     4 mo follow up, INR, constpation, flu shot     Subjective  Everton is here for follow-up on multiple medical problems.    His 2 main issues are past history of a congenital cardiomyopathy and his ulcerative colitis.    Patient had pan ulcerative colitis diagnosed in 2005.  He has not had resection.  Recently has been having some intermittent loose stools with constipation with abdominal bloating.  C. difficile was negative in August.  CT enterography normal July of this year.  Last colonoscopy May 2015, he has another colonoscopy plan for this December, ordered by his gastroenterologist.    He is back on the mesalamine.  He has been on prednisone since September, 1 more week of 10 mg then down to 5 mg.  His bowels have improved over the past month and no blood in stool over the past month.  Is been taking MiraLAX and Citrucel more regularly.  Taking magnesium citrate and higher doses of MiraLAX periodically to cleanse himself of the constipation, he did that twice last week.  Still intermittently bloated, but much better this week.  No fever.  Having more regular bowel movements now.    He did gain some weight with abdominal bloating and some mild increased lower extremity edema last week.  Been taking Lasix 80 mg twice a day for the past week, otherwise he takes 80 mg in the morning and 40 mg in the afternoon.    Still on carvedilol 12.5 mg twice a day and lisinopril 5 mg a day.    He runs a low blood pressure, around 100/60.  Denies lightheaded dizzy spells or orthostasis or falls.    No chest pain or chest pressure.  CT angiogram in 2011 negative for coronary artery disease.    He has a congenital right ventricular dysplasia cardiomyopathy.  Last cardiac echo May of this year with ejection fraction stable at 25% is no change from the previous  year.    He has a dual-chamber pacemaker with AICD.  Apparently his battery is running low and he may need to change of the, that may need to happen by December or January.  He is following closely with cardiology in regards to this.    No recent firing of AICD.  History of ventricular tachycardia.  He also has paroxysmal atrial fibrillation without recent recurrence.  He is on Coumadin.  No stroke or TIA history.  No bleeding history.    Still on amiodarone.    He has hypothyroid, stable dose Synthroid 50 mcg.  Normal TSH in .    No flare of his asthma, still on Singulair and Flovent.    He is using Astelin and Flonase for his nose, some mild postnasal drip without increased.    No flare of his dysthymia, and Cymbalta.    BPH, is voiding well on Flomax.  He had some previous prostatitis and voiding issues previously, possible bladder stone but he feels he is voiding well now.  2016 renal ultrasound with some left renal cortical scarring.    Patient had a low potassium in , took a potassium supplement 20 medical once for a month, but then did not continue it.  He is not on a potassium supplement now.  No new muscle aches.  No muscle cramps.  No chest pain.    PMHx:    Past Medical History:   Diagnosis Date     Atrial fibrillation      Pierce's esophagus      CHF (congestive heart failure)      Congenital cardiomyopathy in       History of anesthesia complications     nausea     History of transfusion      Hypothyroidism      ICD (implantable cardioverter-defibrillator) in place      Pacemaker      Ulcerative colitis      Ventricular tachycardia      PSHx:    Past Surgical History:   Procedure Laterality Date     ASD REPAIR       CARDIAC DEFIBRILLATOR PLACEMENT       CARDIAC SURGERY       COLONOSCOPY N/A 2015    Procedure: COLONOSCOPY with biopsy;  Surgeon: Fernie Akers MD;  Location: [facility];  Service:      HAND SURGERY Left      LIPOMA RESECTION      x2     RI REVISE MEDIAN N/CARPAL  "TUNNEL SURG  9/21/2016    Procedure: OPEN RIGHT CARPAL TUNNEL RELEASE CORTISONE INJECTION RIGHT THUMB;  Surgeon: Duong Santoyo MD;  Location: [facility];  Service: Orthopedics     Immunizations:   Immunization History   Administered Date(s) Administered     Influenza, inj, historic 10/22/2008, 09/25/2009, 11/07/2011     Influenza, seasonal,quad inj 36+ mos 09/12/2016, 10/16/2017     Influenza, seasonal,quad inj 6-35 mos 11/30/2012, 10/02/2014     Influenza,seasonal quad, PF, 36+MOS 10/02/2015     Tdap 07/20/2012       ROS A comprehensive review of systems was performed and was otherwise negative    Medications, allergies, and problem list were reviewed and updated    Exam  /66  Pulse 63  Ht 5' 8\" (1.727 m)  Wt 184 lb (83.5 kg)  SpO2 98%  BMI 27.98 kg/m2  No JVD.  No thrush.  Lungs are clear without wheezing or crackles.  No dullness.  Heart is regular with just occasional premature beat, pacemaker.  No murmur.  No ankle edema.  Abdomen soft very mild left lower quadrant and left upper quadrant tenderness to palpation but soft and no guarding.  No hepatomegaly.    Assessment/Plan  1. Chronic systolic CHF (congestive heart failure), NYHA class 2  Well compensated.  Recent weight gain may have been for abdominal bloating as well.  He took a higher dose of Lasix 80 mg twice a day over the past week and his weight is down and edema resolved.  Back below his baseline weight.  He can go back to the 80/40 mg Lasix dosing.    Continue carvedilol 0.5 mg twice a day and lisinopril 5 mg a day.  He tolerates lower blood pressure.    He may need to restart his potassium supplement, check potassium today.    2. Paroxysmal atrial fibrillation  No recent recurrence.  Monitoring through his pacemaker.  Long-term Coumadin.  - INR    3. Ulcerative colitis without complications, unspecified location  Recent flare, possibly exacerbated by irritable bowel and constipation.  Using higher dose MiraLAX and senna, " intermittent magnesium citrate.    His alanine and prednisone for his ulcerative colitis.    Following closely with gastroenterology and will be called soon for follow-up later this year.  Management per gastroenterology.    Colonoscopy scheduled for December.    Plan is to go off Coumadin for 5 days for his colonoscopy, no bridging Lovenox.  Restart Coumadin today after colonoscopy if no major bleeding, restart Coumadin at usual dose.    He should skip the furosemide in the morning of colonoscopy.    4. ICD (implantable cardioverter-defibrillator), biventricular, in situ  May need battery replacement and lead change, followed closely by cardiology.  Patient stated he may need this done by December or January.      5. Medication monitoring encounter    - Comprehensive Metabolic Panel  - HM2(CBC w/o Differential)    6. Hypothyroidism due to medication  Adjust dose if needed  - Thyroid Stimulating Hormone (TSH)    Flu shot today given.    BPH well-controlled on Flomax without recurrent prostatitis issues.    History of mild intermittent asthma, no flare.  Singulair and Flovent.    History of dysthymia and anxiety, continue Cymbalta.    Return in about 3 months (around 1/16/2018) for Recheck.   Patient Instructions   Lab work today.    INR today.    No change in medications or treatment plan today.    You received a flu shot today.    Follow-up with gastroenterology as planned.    Follow-up with cardiology as planned regarding your pacemaker.    See me in January, 40 minute appointment, may be a preop.    Fredrick Wolff MD  Total time with patient over 25 minutes and over 50% coord care.  Time all face to face.      Current Outpatient Prescriptions   Medication Sig Dispense Refill     amiodarone (PACERONE) 200 MG tablet TAKE 1 TABLET BY MOUTH EVERY DAY 90 tablet 1     azelastine (ASTELIN) 137 mcg (0.1 %) nasal spray 2 sprays into each nostril 2 (two) times a day. Use in each nostril as directed 30 mL 1     carvedilol  (COREG) 12.5 MG tablet TAKE 1 TABLET BY MOUTH TWICE DAILY 180 tablet 0     DULoxetine (CYMBALTA) 20 MG capsule TAKE 1 CAPSULE BY MOUTH ONCE DAILY 90 capsule 3     fluticasone (FLONASE) 50 mcg/actuation nasal spray USE 2 SPRAYS IN EACH NOSTRIL ONCE DAILY. 48 g 3     fluticasone (FLOVENT HFA) 220 mcg/actuation inhaler Inhale 1 puff 2 (two) times a day. 1 Inhaler 0     furosemide (LASIX) 80 MG tablet Take 1 tablet in the morning and one half in the afternoon (Patient taking differently: Take 80 mg by mouth 2 (two) times a day at 9am and 6pm. Take 1 tablet in the morning and one half in the afternoon) 180 tablet 1     levothyroxine (SYNTHROID, LEVOTHROID) 50 MCG tablet TAKE 1 TABLET BY MOUTH EVERY DAY 90 tablet 3     lisinopril (PRINIVIL,ZESTRIL) 5 MG tablet Take 5 mg by mouth daily.        mesalamine (ASACOL HD) 800 mg TbEC EC tablet TK 3 Tabets PO BID  1     montelukast (SINGULAIR) 10 mg tablet Take 1 tablet (10 mg total) by mouth at bedtime. 90 tablet 3     omeprazole (PRILOSEC) 20 MG capsule TAKE 1 CAPSULE BY MOUTH ONCE DAILY 90 capsule 3     polyethylene glycol (MIRALAX) 17 gram packet Take 17 g by mouth 2 (two) times a day.       predniSONE (DELTASONE) 10 mg tablet   0     senna-docusate (SENNOSIDES-DOCUSATE SODIUM) 8.6-50 mg tablet Take 1 tablet by mouth 2 (two) times a day as needed for constipation.  0     tamsulosin (FLOMAX) 0.4 mg Cp24 TAKE 1 CAPSULE BY MOUTH DAILY AT DINNER 90 capsule 2     VENTOLIN HFA 90 mcg/actuation inhaler INHALE 2 PUFFS EVERY 6 HOURS AS NEEDED FOR WHEEZING 18 g 4     warfarin (COUMADIN) 5 MG tablet TAKE 1 TABLET BY MOUTH DAILY OR AS DIRECTED 90 tablet 1     No current facility-administered medications for this visit.      Allergies   Allergen Reactions     Codeine      nausea     Social History   Substance Use Topics     Smoking status: Former Smoker     Packs/day: 1.00     Years: 25.00     Quit date: 11/6/2008     Smokeless tobacco: Never Used     Alcohol use No

## 2021-06-14 NOTE — PATIENT INSTRUCTIONS - HE
Reevaluate your blood pressure with your transplant team appointment is winter.    I will have you adjust your levothyroxine dose, depending on your thyroid test today.    I suspect you are having nerve compression as a cause of your right thumb numbness.  It may be in the hand or forearm area, or you could be affecting her brachial plexus in your shoulder.  Avoid reaching or strain activity that causes worsening symptoms.  If increasing numbness or pain on her right thumb or hand, you could consider referral to physical therapy to help with range of motion exercises for your shoulder and hand.  You could consider a wrist brace.  If significant worsening symptoms, you could consider further evaluation including MRI, nerve conduction study and orthopedic referral for the right hand symptoms.    Follow-up with me in 3 months for blood pressure and thyroid, and general checkup.

## 2021-06-14 NOTE — PROGRESS NOTES
Chief complaint: Asthma follow-up    History of present illness: This is a pleasant 52-year-old gentleman with a history of allergic rhinitis and asthma who is here today for follow-up visit.  He is currently on Flovent 220 mcg 2 puffs twice daily.  With this he reports he has had less chest congestion.  ACT score today is 17.  He denies any wheezing but does have some shortness of breath.  He notes he has been struggling recently with constipation believe that is causing some of his shortness of breath.  He does have a lot of drainage.  He did have Astelin added to his regimen last time we saw him.  He has been using this at night and reports that he seems to be less congested in the morning.  He continues on Flonase as well.    Past medical history, social history, family medical history, meds and allergies reviewed and updated accordingly.    Review of Systems performed as above and the remainder is negative.      Current Outpatient Prescriptions:      amiodarone (PACERONE) 200 MG tablet, TAKE 1 TABLET BY MOUTH EVERY DAY, Disp: 90 tablet, Rfl: 1     azelastine (ASTELIN) 137 mcg (0.1 %) nasal spray, 2 sprays into each nostril 2 (two) times a day. Use in each nostril as directed, Disp: 30 mL, Rfl: 5     carvedilol (COREG) 12.5 MG tablet, TAKE 1 TABLET BY MOUTH TWICE DAILY, Disp: 180 tablet, Rfl: 3     dicyclomine (BENTYL) 10 MG capsule, Take 10 mg by mouth 3 (three) times a day., Disp: , Rfl:      DULoxetine (CYMBALTA) 20 MG capsule, TAKE 1 CAPSULE BY MOUTH ONCE DAILY, Disp: 90 capsule, Rfl: 3     fluticasone (FLONASE) 50 mcg/actuation nasal spray, USE 2 SPRAYS IN EACH NOSTRIL ONCE DAILY., Disp: 48 g, Rfl: 3     fluticasone (FLOVENT HFA) 220 mcg/actuation inhaler, Inhale 1 puff 2 (two) times a day., Disp: 1 Inhaler, Rfl: 5     furosemide (LASIX) 80 MG tablet, Take 1 tablet in the morning and one half in the afternoon (Patient taking differently: Take 80 mg by mouth 2 (two) times a day at 9am and 6pm. Take 1 tablet  in the morning and one half in the afternoon), Disp: 180 tablet, Rfl: 1     levothyroxine (SYNTHROID, LEVOTHROID) 50 MCG tablet, TAKE 1 TABLET BY MOUTH EVERY DAY, Disp: 90 tablet, Rfl: 3     lisinopril (PRINIVIL,ZESTRIL) 5 MG tablet, Take 5 mg by mouth daily. , Disp: , Rfl:      mesalamine (ASACOL HD) 800 mg TbEC EC tablet, TK 3 Tabets PO BID, Disp: , Rfl: 1     montelukast (SINGULAIR) 10 mg tablet, Take 1 tablet (10 mg total) by mouth at bedtime., Disp: 90 tablet, Rfl: 3     omeprazole (PRILOSEC) 20 MG capsule, TAKE 1 CAPSULE BY MOUTH ONCE DAILY, Disp: 90 capsule, Rfl: 3     polyethylene glycol (MIRALAX) 17 gram packet, Take 17 g by mouth 2 (two) times a day., Disp: , Rfl:      senna-docusate (SENNOSIDES-DOCUSATE SODIUM) 8.6-50 mg tablet, Take 1 tablet by mouth 2 (two) times a day as needed for constipation., Disp: , Rfl: 0     tamsulosin (FLOMAX) 0.4 mg Cp24, TAKE 1 CAPSULE BY MOUTH DAILY AT DINNER, Disp: 90 capsule, Rfl: 2     VENTOLIN HFA 90 mcg/actuation inhaler, INHALE 2 PUFFS EVERY 6 HOURS AS NEEDED FOR WHEEZING, Disp: 18 g, Rfl: 4     warfarin (COUMADIN) 5 MG tablet, TAKE 1 TABLET BY MOUTH DAILY OR AS DIRECTED, Disp: 90 tablet, Rfl: 1     predniSONE (DELTASONE) 10 mg tablet, , Disp: , Rfl: 0    Allergies   Allergen Reactions     Codeine      nausea       /60  Pulse 68  Wt 183 lb (83 kg)  BMI 27.83 kg/m2    Gen: Pleasant male not in acute distress  HEENT: Eyes no erythema of the bulbar or palpebral conjunctiva, no edema. Ears: TMs well visualized, no effusions. Nose: No congestion, mucosa normal. Mouth: Throat clear, no lip or tongue edema.   Cardiac: Regular rate and rhythm, no murmurs, rubs or gallops  Respiratory: Clear to auscultation bilaterally, no adventitious breath sounds    Skin: No rashes or lesions  Psych: Alert and oriented times 3    FENO:  16    Spirometry was performed.  It should be noted that we were only able to obtain to reproducible matches.  FEV1 to FVC ratio 69%.  FEV1 1.7 L or  48% predicted.  FVC 2.5 L or 54% of predicted.    Impression report and plan:  1.  Moderate persistent asthma  2.  Allergic rhinitis    Continue Flovent 220 micro grams 2 puffs twice daily.  Updated asthma action plan provided.  Refilled Ventolin inhaler.  Increase Astelin nasal spray to 2 sprays each nostril twice daily.  Add nasal rinses.  Continue Flonase.  Recommended Breathe Right strips at night for increased congestion.  Follow in 6 months.

## 2021-06-14 NOTE — PROGRESS NOTES
[facility] clinic Follow Up Note    Everton Larios   55 y.o. male    Date of Visit: 1/4/2021    Chief Complaint   Patient presents with     Follow-up     Subjective  Everton is here for follow-up of multiple medical issues.    His chief complaint today is some right thumb and hand numbness and mild pain, especially when he reaches across his car.  He also rests his right hand on the armrest that he thinks may be causing some pressure on his forearm.    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  The numbness is fairly mild, with brief exacerbations with certain movements as above.   strength is still good.  There is no significant pain currently.    He has had some degenerative changes with his shoulder in the past.  He denies neck pain.    No increasing shortness of breath or edema.  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  He is now post heart transplant.  Last echo of his heart transplant December 2019 showed no valve problem.  Ejection fraction 60-65%.    January 2020 creatinine was 1.1.  Creatinine in June of last year was 1.4.  Creatinine in October was 1.65.    November 28, 2020 labs reviewed with a creatinine of 1.56.    He was taken off CellCept and now on Rapamune in addition to his Prograf.  He is managed by the transplant team for this.    He runs chronic tachycardia since the transplant.  He denies any palpitations.  No fainting spells.    He did have an angiogram February 2020 of the transplanted heart that did show some mild LAD coronary disease.  He is on Crestor 10 mg and aspirin 81 mg.    No history of sleep apnea.    Past history of mild intermittent asthma but has not been a recent issue since the transplant.  He no longer uses inhalers or Singulair.    Past history of ulcerative colitis with previous C. difficile and CMV colitis.  He is on balsalazide.   2020 colonoscopy did not show a polyp, and colitis was in remission.  He has some mild intermittent constipation but no diarrhea or bleeding issues now.  He does not have a plan to follow-up with GI at this time.  He thinks that is been delayed with Covid.    I did review the MRCP from 2019.  There is some bile system heterogenicity, possibly an atypical hemangioma.  And plan to follow-up MRCP last year but that was not done.  No new right upper quadrant pain or jaundice issues.    Still tolerating Fosamax without significant swallowing issues.  Previous T12 compression fracture.    Past history of Pierce's esophagus on omeprazole.  Denies swallowing issues.  No dyspepsia complaints.    History of dysthymia on Cymbalta.    I did note that in September of last year his TSH was less than 0.1.  High T4 at 1.5.  He denies palpitations and weight stable, he is actually gained some weight.  No tremor.  He feels euthyroid.  He is compliant with his levothyroxine and no dose change after that lab work.    He states his blood pressure has been running higher with the transplant team.    He states he is now actually taking 2 amlodipine or 10 mg a day.  No lower extremity edema or orthostasis.    Is not checked his blood pressure on his own.    No hematuria complaints.    PMHx:    Past Medical History:   Diagnosis Date     Arthritis     hands, neck     ASD (atrial septal defect)     s/p repair age 3     Asthma      Atrial fibrillation (H)      Pierce's esophagus      Cataract      CHF (congestive heart failure) (H)      Clotting disorder (H)      Congenital cardiomyopathy in  (H)      Depression      DJD (degenerative joint disease)     neck     History of anesthesia complications     nausea     History of transfusion      Hypothyroidism      ICD (implantable cardioverter-defibrillator) in place      Pacemaker      Ulcerative colitis (H)      Ventricular tachycardia (H)      PSHx:    Past Surgical  History:   Procedure Laterality Date     ABLATION OF DYSRHYTHMIC FOCUS      for A fib     ASD REPAIR  1968     CARDIAC DEFIBRILLATOR PLACEMENT      x2--last was Feb 2018     COLONOSCOPY N/A 2/5/2015    Procedure: COLONOSCOPY with biopsy;  Surgeon: Fernie Akers MD;  Location: [facility];  Service:      COLONOSCOPY N/A 12/19/2017    Procedure: COLONOSCOPY with biopsies and polypectomies using snare;  Surgeon: Gael Romero MD;  Location: [facility];  Service:      ESOPHAGOGASTRODUODENOSCOPY N/A 12/19/2017    Procedure: ESOPHAGOGASTRODUODENOSCOPY (EGD) with biopsies;  Surgeon: Gael Romero MD;  Location: [facility];  Service:      HAND SURGERY Left      LIPOMA RESECTION      x2     CT REVISE MEDIAN N/CARPAL TUNNEL SURG  9/21/2016    Procedure: OPEN RIGHT CARPAL TUNNEL RELEASE CORTISONE INJECTION RIGHT THUMB;  Surgeon: Duong Santoyo MD;  Location: [facility] OR;  Service: Orthopedics     Immunizations:   Immunization History   Administered Date(s) Administered     INFLUENZA,SEASONAL QUAD, PF, =/> 6months 10/02/2015, 09/16/2020     Influenza, inj, historic,unspecified 10/22/2008, 09/25/2009, 11/07/2011     Influenza, seasonal,quad inj 6-35 mos 11/30/2012, 10/02/2014     Influenza,seasonal, Inj IIV3 10/22/2008, 09/25/2009, 11/07/2011, 10/27/2013, 10/02/2014     Influenza,seasonal,quad inj =/> 6months 09/12/2016, 10/16/2017, 09/20/2018, 09/20/2019     OPV,Trivalent,Historic(5807-0547 only) 10/20/1978     Pneumo Conj 13-V (2010&after) 09/26/2018     Pneumo Polysac 23-V 09/28/2019     Td, Adult, Absorbed 10/20/1978     Tdap 07/20/2012       ROS A comprehensive review of systems was performed and was otherwise negative    Medications, allergies, and problem list were reviewed and updated    Exam  /80   Pulse (!) 108   Temp 97.4  F (36.3  C) (Other) Comment (Src): forehead  Wt 169 lb 12.8 oz (77 kg)   BMI 26.01 kg/m    Alert and oriented x3.  Good mood and affect.  No jaundice.  Pupils  and irises equal and reactive.  No JVD.  Lungs are clear to auscultation with good respiratory excursion.  No crackles or dullness.  Heart is tachycardia, which is baseline.  No murmur rub or gallop.  No ankle edema.  Abdomen is nonobese nontender.  No hepatosplenomegaly.    Right thumb appears normal.  Skin appears normal.   strength is normal.  No tenderness to palpation.  Good arm range of motion.    Assessment/Plan  1. Essential hypertension, benign  Blood pressure improved on recheck.  Patient does reports been running somewhat higher recently.  He will follow-up with his transplant team later this winter, he believes next month.  He will continue on his current dose of amlodipine.    Elevated creatinine, possibly related to his transplant medications.  I will be rechecking his kidney labs today and follow-up with the transplant team who manages his immunosuppression.    2. 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  If worsening symptoms, consider physical therapy referral for range of motion exercises for his shoulder.    If severe symptoms, consider EMG, MRI imaging and orthopedic evaluation.    We will try repositioning and avoiding strain activity with his driving currently.    3. Hypothyroidism, unspecified type  His labs showed hyperthyroidism but clinically euthyroid.    He is on the same dose of his levothyroxine, 100 mcg.  I would adjust the dose of his levothyroxine if the labs again showed hyperthyroid, but clinically he does appear euthyroid.  - Thyroid Stimulating Hormone (TSH)  - T4, Free    4. Heart replaced by transplant (H)  Chronic tachycardia since transplant.    Immunosuppression managed by the transplant team and patient is planning to follow-up with them next month.    He does have some mild coronary disease in his transplanted heart.  Continue Crestor 10 mg and aspirin 81 mg.    There was some  questionable bile system heterogenicity on December 2019 MRCP.  I will be followed up with his transplant team.  Unclear if they are still planning a follow-up MRCP.  Recheck liver test today.    5. Osteoporosis, unspecified osteoporosis type, unspecified pathological fracture presence  History of T12 compression fracture.  Tolerating Fosamax.    6. Ulcerative colitis without complications, unspecified location (H)  Quiesced sent currently.  Follow-up with GI.  He is not heard from GI at this time, would anticipate follow-up colonoscopy this year or next year.  Balsalazide managed by GI.    History of C. difficile and CMV colitis previously    7. Pierce's esophagus without dysplasia  Denies new swallowing problems.  Symptoms controlled.  Long-term PPI.  - omeprazole (PRILOSEC) 40 MG capsule; Take 1 capsule (40 mg total) by mouth every evening.  Dispense: 90 capsule; Refill: 3    8. Encounter for therapeutic drug monitoring    - Comprehensive Metabolic Panel    9. Chronic renal insufficiency, stage 2 (mild)  As above  - Comprehensive Metabolic Panel    Has had flu shot.    He will be speaking with his transplant team next month about the COVID-19 vaccine, which he plans to get.    History of dysthymia, currently well controlled.  PHQ-9 score today of just 1.  Continue on current Cymbalta 20 mg.    Return in about 3 months (around 4/4/2021) for Recheck.   Patient Instructions   Reevaluate your blood pressure with your transplant team appointment is winter.    I will have you adjust your levothyroxine dose, depending on your thyroid test today.    I suspect you are having nerve compression as a cause of your right thumb numbness.  It may be in the hand or forearm area, or you could be affecting her brachial plexus in your shoulder.  Avoid reaching or strain activity that causes worsening symptoms.  If increasing numbness or pain on her right thumb or hand, you could consider referral to physical therapy to help with  range of motion exercises for your shoulder and hand.  You could consider a wrist brace.  If significant worsening symptoms, you could consider further evaluation including MRI, nerve conduction study and orthopedic referral for the right hand symptoms.    Follow-up with me in 3 months for blood pressure and thyroid, and general checkup.    Fredrick Wolff MD        Current Outpatient Medications   Medication Sig Dispense Refill     alendronate (FOSAMAX) 70 MG tablet Take 1 tablet by mouth once a week.  3     amLODIPine (NORVASC) 5 MG tablet Take 5 mg by mouth daily.  0     aspirin 81 mg chewable tablet Chew 81 mg daily.       balsalazide (COLAZAL) 750 mg capsule 3 (three) times a day.   5     calcium carbonate-vit D3-min 600 mg calcium- 400 unit Tab Take 1 tablet by mouth 2 (two) times a day with meals.              cetirizine (ZYRTEC) 10 MG tablet Take 10 mg by mouth daily.       DULoxetine (CYMBALTA) 20 MG capsule Take 1 capsule (20 mg total) by mouth daily. 90 capsule 1     levothyroxine (SYNTHROID, LEVOTHROID) 100 MCG tablet TAKE 1 TABLET BY MOUTH EVERY DAY 90 tablet 2     melatonin 3 mg Tab tablet Take 6 mg by mouth at bedtime as needed.       montelukast (SINGULAIR) 10 mg tablet TAKE 1 TABLET BY MOUTH AT BEDTIME 90 tablet 2     multivitamin with minerals (THERA M PLUS, FERROUS FUMARAT,) 9 mg iron-400 mcg Tab tablet Take 1 tablet by mouth daily.       omeprazole (PRILOSEC) 40 MG capsule Take 1 capsule (40 mg total) by mouth every evening. 90 capsule 3     rosuvastatin (CRESTOR) 10 MG tablet Take 10 mg by mouth daily.       senna-docusate (PERICOLACE) 8.6-50 mg tablet Take 2 tablets by mouth 2 (two) times a day as needed.              sirolimus (RAPAMUNE) 0.5 mg Tab Take 0.5 mg by mouth daily.        tacrolimus (PROGRAF) 1 MG capsule Take 3 capsules (3 mg) in the morning and 2 capsules (2 mg) in the evening.       acetaminophen (TYLENOL) 325 MG tablet Take 650 mg by mouth every 4 (four) hours as needed for pain.               No current facility-administered medications for this visit.      Allergies   Allergen Reactions     Adhesive Tape-Silicones      Other reaction(s): Blisters  Tegaderm causes bruises, blisters and extreme irritation.     Hydromorphone Other (See Comments)     Significant Delirium     Lisinopril Other (See Comments)     hypotension     Codeine Nausea Only     nausea     Social History     Tobacco Use     Smoking status: Former Smoker     Packs/day: 1.00     Years: 25.00     Pack years: 25.00     Quit date: 2008     Years since quittin.1     Smokeless tobacco: Never Used   Substance Use Topics     Alcohol use: Yes     Alcohol/week: 1.0 - 2.0 standard drinks     Types: 1 - 2 Standard drinks or equivalent per week     Frequency: 2-4 times a month     Drinks per session: 1 or 2     Drug use: No

## 2021-06-15 NOTE — PROGRESS NOTES
VASHTI GONZALEZ/EP NC ZAY Review  Dr Casas reviewed ZAY checklist  Reviewed by Cady Ramos    Pt aware of above orders and Scheduling aware of needed f/u  Cady

## 2021-06-15 NOTE — PROGRESS NOTES
1965  [phone] (home)  [phone] (mobile)    +++Important patient information for CSC/Cath Lab staff : None+++    [facility] EP Cath Lab Procedure Order     Device Implant/Revision:  Procedure: Generator Change Out  Device Type: BIV ICD  Device Company/Device Rep Needed for Procedure: [employer]    Diagnosis:  Cardiomyopathy  Anticipated Case Duration:  Standard  Scheduling Needs/Timeframe:  ZAY 12-25-17,  needs changeout by 3-25-18  Anesthesia:  None  Research Protocol:  No    [facility] EP Cath Lab Prep   Ordering Provider: Dr Casas  Ordering Date: 2/2/2018  Orders Status: Intial order placed and Order set placed  EP NC Contact: Neva Alejo RN    H&P:  PMD to Complete or GAG office visit 2-1-18  PCP: Fredrick Wolff MD, [phone]    Pre-op Labs: Ordered AM of procedure    Medical Records Pertinent for Procedure:  Echo 1-25-18 EF 19%, EKG 2-1-18 paced and Device Implant Record Implant with Dr. Guallpa 11-6-08    Patient Education:    Teach with Patient: Completed via phone prior to procedure, and letter was also sent to pt via mail/[employer] with pre-procedural instructions    Risks Reviewed:        Internal Cardiac Defibrillator     <1% for each of the following: infection, bleeding, hematoma,   pneumothorax, subclavian vein thrombosis, cardiac perforation, cardiac tamponade, arrhythmias, pectoral or diaphragmatic stimulation, air embolus, pocket erosion.    <4 % lead dislodgment; <1% lead fracture or generator malfunction.    <0.5% CVA or MI.     <0.1% death.    If external defibrillation is needed, 25% risk for superficial burn.    <5% risk of ICD system revision.    >95% VT/VF success implant rate.    Risks associated with general anesthesia will be addressed by the Anesthesiology Department.    For patients on anticoagulation, the risk of bleeding, hematoma, tamponade, and stroke with partial withdrawal are increased.    Patient education also completed on MILANA, spurious shocks, driving limitation, and  psychological and social aspects of having an ICD.      Biventricular Implants  In addition to standard risks for ICD or pacemaker implant, there is:    90% success of LV lead placement.    10%  dislodgment (LV lead)    <3% risk venous rupture, cardiac tamponade    Patient counseled re: options if LV lead placement is not feasible transvenously.      Consent: [place] [facility] [place]    Pre-Procedure Instructions that were Reviewed with Patient:  NPO after midnight, Notified patient of time and date of procedure by CV , Transportation arrangements needed s/p procedure, Post-procedure follow up process, Sedation plan/orders and Pre-procedure letter was sent to pt by CV     Pre-Procedure Medication Instructions:  Instructions given to pt regarding anticoagulants: Coumadin- to hold 1 days prior to procedure  Instructions given to pt regarding antiarrhythmic medication: Amiodarone; Pt instructed to continue medication prior to procedure  Instructions for medication, other than anticoagulants/antiarrhythmics listed above, given to pt: to take all morning medications with small sips of water, with the exception of OTC supplements and MVI      Allergies   Allergen Reactions     Codeine      nausea       Current Outpatient Prescriptions:      amiodarone (PACERONE) 200 MG tablet, TAKE 1 TABLET BY MOUTH EVERY DAY, Disp: 90 tablet, Rfl: 1     azelastine (ASTELIN) 137 mcg (0.1 %) nasal spray, 2 sprays into each nostril 2 (two) times a day. Use in each nostril as directed, Disp: 30 mL, Rfl: 5     carvedilol (COREG) 12.5 MG tablet, TAKE 1 TABLET BY MOUTH TWICE DAILY, Disp: 180 tablet, Rfl: 3     dicyclomine (BENTYL) 10 MG capsule, Take 10 mg by mouth 3 (three) times a day., Disp: , Rfl:      DULoxetine (CYMBALTA) 20 MG capsule, TAKE 1 CAPSULE BY MOUTH ONCE DAILY, Disp: 90 capsule, Rfl: 2     fluticasone (FLONASE) 50 mcg/actuation nasal spray, SPRAY TWICE IN EACH NOSTRIL ONCE A DAY, Disp:  16 g, Rfl: 2     fluticasone (FLOVENT HFA) 220 mcg/actuation inhaler, Inhale 2 puffs 2 (two) times a day., Disp: 1 Inhaler, Rfl: 5     furosemide (LASIX) 80 MG tablet, Take 1 tablet in the morning and one half in the afternoon (Patient taking differently: Take 80 mg by mouth 2 (two) times a day at 9am and 6pm. Take 1 tablet in the morning and one half in the afternoon), Disp: 180 tablet, Rfl: 1     levothyroxine (SYNTHROID, LEVOTHROID) 50 MCG tablet, TAKE 1 TABLET BY MOUTH EVERY DAY, Disp: 90 tablet, Rfl: 3     lisinopril (PRINIVIL,ZESTRIL) 5 MG tablet, Take 1 tablet (5 mg total) by mouth daily., Disp: 90 tablet, Rfl: 2     mesalamine (ASACOL HD) 800 mg TbEC EC tablet, TK 3 Tabets PO BID, Disp: , Rfl: 1     montelukast (SINGULAIR) 10 mg tablet, Take 1 tablet (10 mg total) by mouth at bedtime., Disp: 90 tablet, Rfl: 3     nitroglycerin (NITROSTAT) 0.3 MG SL tablet, Place 0.3 mg under the tongue every 5 (five) minutes as needed for chest pain., Disp: , Rfl:      omeprazole (PRILOSEC) 20 MG capsule, TAKE 1 CAPSULE BY MOUTH ONCE DAILY, Disp: 90 capsule, Rfl: 3     polyethylene glycol (MIRALAX) 17 gram packet, Take 17 g by mouth 2 (two) times a day., Disp: , Rfl:      potassium chloride SA (K-DUR,KLOR-CON) 20 MEQ tablet, TAKE 1 TABLET(20 MEQ) BY MOUTH DAILY, Disp: 90 tablet, Rfl: 11     senna-docusate (SENNOSIDES-DOCUSATE SODIUM) 8.6-50 mg tablet, Take 1 tablet by mouth 2 (two) times a day as needed for constipation., Disp: , Rfl: 0     tamsulosin (FLOMAX) 0.4 mg Cp24, TAKE 1 CAPSULE BY MOUTH DAILY AT DINNER, Disp: 90 capsule, Rfl: 2     VENTOLIN HFA 90 mcg/actuation inhaler, INHALE 2 PUFFS EVERY 6 HOURS AS NEEDED FOR WHEEZING, Disp: 18 g, Rfl: 5     warfarin (COUMADIN) 5 MG tablet, Take 1-1 1/2 tablets (5-7.5 mg) by mouth daily as directed. Adjust dose per INR results., Disp: 120 tablet, Rfl: 1

## 2021-06-15 NOTE — PROGRESS NOTES
Heart Care Device Change-Out Checklist (ZAY Checklist)    Device Data  ICD and Biventricular    :  [employer]   Model:  N119 COGNIS 100-D  Serial Number:  [id number]    Implant Date: 11/6/2008  ZAY Date:  12/25/2017  Device Diagnosis:  Non-ischemic Cardiomyopathy, Atrial Fibrillation    Device Alert(s):  No    Lead Data  (Print Device History and attach to this document. Enter each lead  and model number.)  Last Interrogation Date: 9/13/2017      Atrium: [employer] [address] EZ   Lead Imp:  553Ohms, Pacing Threshold:  2.1V@1ms and Sensing Threshold:  0.4mV      Right Ventricle: [employer] [address] [place]   Lead Imp:  439Ohms, Pacing Threshold:  0.7V@0.5ms and Sensing Threshold:  14.4mV   Shock Imp: 57 Ohms      Left Ventricle: [employer] 4543 [address] 2 IS-1, 90 cm   Lead Imp:  660Ohms, Pacing Threshold:  2.4V@1ms and Sensing Threshold:  21.2mV    Old Leads Present/Abandoned: No    Lead Alert(s):  No, however very small P waves noted and intermittent atrial undersensing found, in addition elevated RA threshold.     Diagnostic Information  Intrinsic Rhythm:  Sinus Bradycardia with first degree AVB    Atrial Fibrillation:  Paroxysmal Atrial-Fib  Takes Anticoagulant? Yes  Description:  Warfarin    Pacing Percentages  Atrial Pacing 63% and Ventricle Pacing 88% RV and 93%LV  Pacemaker Dependent? No    History of VT/VF therapy    ATP: No  Appropriate Shocks:  n/a    Ejection Fraction  Last EF Date:  5/8/2017    By Echocardiogram  Last EF Measurement:  25%      Heart Failure Diagnostics:  Could be beneficial for patient     Special Instructions/Timeframe for change-out:  Routine, device to be changed out by 3/25/2017    Routed to EP:  Yes: Dr. Celio Casas      Device RN:   Susan Carbajal RN BSN PHN  Device Nurse   [facility]

## 2021-06-15 NOTE — PROGRESS NOTES
Medical Examination      Assessment:      Healthy male exam.   Meets standards, but periodic monitoring required due to heart transplant.   qualified only for 1 year.      Plan:        Certificate printed and given to pt.   His chronic medical problems are all stable and he follows with all his providers regularly.  Continue current treatments.  Follow-up as scheduled .          Subjective:      Everton Larios is a 55 y.o. male who presents today for a  fitness determination physical exam. The patient reports no problems.  He fells better than he ever thought her could.      Review of Systems  Pertinent items are noted in HPI.     Objective:      Vision:  Wears reading glasses only   Uncorrected Corrected Horizontal Field of Vision   Right Eye 20/32 NA N/A   Left Eye  20/32 NA N/A   Both Eyes  20/25 NA      Applicant can recognize and distinguish among traffic control signals and devices showing standard red, green, and dilia colors.    Monocular Vision?: No    Hearing:  Whisper test 5 feet bilat     PHYSICAL EXAM:  General Appearance: Alert, cooperative, no distress, appears stated age  Head: Normocephalic, without obvious abnormality, atraumatic. Old scar on left forehead  Eyes: Pupils equal, symmetric  Ears: Normal TMs and external ear canals, both ears  Nose: Nares normal, septum midline, mucosa normal, no drainage  Throat: Lips, mucosa, and tongue normal; teeth and gums normal  Neck: Supple, symmetrical, trachea midline, no adenopathy;  thyroid: not enlarged, symmetric, no tenderness/mass/nodules  Back: Symmetric, no curvature, ROM normal, no CVA tenderness  Lungs: Clear to auscultation bilaterally, respirations unlabored  Heart: Regular rate and rhythm, S1 and S2 normal, no murmur, rub, or gallop  Abdomen: Soft, non-tender, bowel sounds active all four quadrants, no masses, no organomegaly  Genitourinary:no hernias  Musculoskeletal: Normal range of motion. No joint  swelling or deformity.   Extremities normal, atraumatic, no cyanosis or edema  Skin: Skin color, texture, turgor normal, no rashes or lesions  Lymph nodes: Cervical nodes normal  Neurologic:  Alert and oriented times 3. Normal reflexes. Cranial nerves II-XII intact.   Psychiatric: Normal mood and affect.    Labs:  Lab Results   Component Value Date    SPECGRAV 1.020 02/22/2021    BILIRUBINUR Negative 02/22/2021    GLUCOSEU Negative 02/22/2021

## 2021-06-15 NOTE — PROGRESS NOTES
[facility] Follow Up Note    Everton Larios   52 y.o. male    Date of Visit: 1/15/2018    Chief Complaint   Patient presents with     Follow-up     Subjective  Everton is here  routine checkup of multiple medical problems and to prepare for upcoming battery change of the defibrillator/pacemaker.   Patient overall is feeling his normal self.  Remains fairly active with regular walking.  Her graph he has a past history of nonischemic cardiomyopathy with congenital right ventricular congenital dysplasia.  Cardiac CT angiogram in 2011 was negative for coronary artery disease.  Last cardiac echo in May 2017 showed essentially stable ejection fraction of 25%.    It is well compensated and he has minimal dyspnea on exertion.  No lower extremity edema.    Stable on current medications with Lasix 80 mg in the morning and 40 mg in the noon.  Carvedilol 12.5 mg twice daily.  Lisinopril 5 mg a day.  He does run a baseline low blood pressure around 100/60.    He has had paroxysmal atrial fibrillation with low burden.  On chronic Coumadin.  History of V. tach with no recent events.  On amiodarone.  Pacemaker check in December showed no arrhythmia.    He needs a battery change soon.    He seen cardiology later this month after cardiac echo.  Patient does not know when his pacemaker battery change as scheduled.    No chest pain or palpitations.  No firing of AICD.  No increasing shortness of breath.    He did develop a mild URI type cold last week, he is already feeling better.  Slight increase of wheezing.  Just occasional use of albuterol.  Singulair.  Does not feel more short of breath than baseline.    Past history of ulcerative colitis diagnosed in 2005.  He has not had surgery.  Just had a colonoscopy December of last year with biopsies that showed some mild lymphocytic colitis appearance of the mucosa.  He was on steroids from September until mid-October.  He has had some problems with diarrhea, using  MiraLAX, Citrucel and senna for that.  Not using magnesium citrate.  He is improving his bowels.  Also on mesalamine.  He just saw GI, Dr. Akers, last week.  Last CT enterography was 2017, reported as negative.    He has hypothyroidism, on Synthroid.  Likely associate with amiodarone.  TSH was normal in October and on stable dose.    Dysthymia controlled on Cymbalta.    BPH stable on Flomax.  No UTI symptoms.     PMHx:    Past Medical History:   Diagnosis Date     Asthma      Atrial fibrillation      Pierce's esophagus      CHF (congestive heart failure)      Clotting disorder      Congenital cardiomyopathy in       History of anesthesia complications     nausea     History of transfusion      Hypothyroidism      ICD (implantable cardioverter-defibrillator) in place      Pacemaker      Ulcerative colitis      Ventricular tachycardia      PSHx:    Past Surgical History:   Procedure Laterality Date     ASD REPAIR  1968     CARDIAC DEFIBRILLATOR PLACEMENT       CARDIAC SURGERY       COLONOSCOPY N/A 2015    Procedure: COLONOSCOPY with biopsy;  Surgeon: Fernie Akers MD;  Location: [facility];  Service:      COLONOSCOPY N/A 2017    Procedure: COLONOSCOPY with biopsies and polypectomies using snare;  Surgeon: Gael Romero MD;  Location: [facility];  Service:      ESOPHAGOGASTRODUODENOSCOPY N/A 2017    Procedure: ESOPHAGOGASTRODUODENOSCOPY (EGD) with biopsies;  Surgeon: Gael Romero MD;  Location: [facility];  Service:      HAND SURGERY Left      LIPOMA RESECTION      x2     DE REVISE MEDIAN N/CARPAL TUNNEL SURG  2016    Procedure: OPEN RIGHT CARPAL TUNNEL RELEASE CORTISONE INJECTION RIGHT THUMB;  Surgeon: Duong Santoyo MD;  Location: [facility] OR;  Service: Orthopedics     Immunizations:   Immunization History   Administered Date(s) Administered     Influenza, inj, historic,unspecified 10/22/2008, 2009, 2011     Influenza, seasonal,quad inj 36+  "mos 09/12/2016, 10/16/2017     Influenza, seasonal,quad inj 6-35 mos 11/30/2012, 10/02/2014     Influenza,seasonal quad, PF, 36+MOS 10/02/2015     Tdap 07/20/2012       ROS A comprehensive review of systems was performed and was otherwise negative    Medications, allergies, and problem list were reviewed and updated    Exam  /72  Pulse 65  Ht 5' 8\" (1.727 m)  Wt 169 lb (76.7 kg)  SpO2 97%  BMI 25.7 kg/m2    Alert and oriented ×3.  Appears well.  No jaundice.  Lungs do show some mild expiratory wheezing bilaterally but moving air well, very mild.  No crackles.  Heart is regular without murmur.  No ankle edema.  Abdomen is nontender.    Assessment/Plan  1. Chronic systolic CHF (congestive heart failure), NYHA class 2  Well compensated.  Continue current medication.    Cardiac echo later this month with follow-up with cardiology.  They will determine if adjustments needed in his dual-chamber pacemaker.    2. Atrial fibrillation, unspecified type  Chronic Coumadin.  - INR    I asked patient to discuss the anticoagulation plan with his cardiologist.  Cardiology may prefer to keep him on his Coumadin for the pacemaker battery change.  Patient will continue on Coumadin at this time.    3. History of ventricular tachycardia  AICD has not fired.    4. ICD (implantable cardioverter-defibrillator), biventricular, in situ  As above    Patient should be able to proceed with pacemaker/defibrillator battery change as planned.  He will see cardiology prior to this as above.    5. Hypothyroidism due to medication  Synthroid  - Thyroid Stimulating Hormone (TSH)    6. Ulcerative colitis without complications, unspecified location  Managed by GI.  Currently in remission.  Constipation managed with senna, MiraLAX and Citrucel.  Mesalamine.  Off steroids since October.    Pierce's esophagus, a symptomatically on Prilosec.  Follow-up endoscopy surveillance per GI.    Apparently GI gave him some sublingual nitroglycerin to use " in case his abdominal pain may be associated with intestinal angina, the low suspicion for that, especially as he does not have a diagnosis of vascular disease.  Patient states he tolerates the nitroglycerin well, can continues at this time, but unclear benefit.    7. Medication monitoring encounter    - Basic Metabolic Panel  - HM2(CBC w/o Differential)    BPH controlled with Flomax.    Mild intermittent asthma controlled.  Slight exacerbation with a viral type URI last week, but he feels he is getting better already and really minimal symptoms now.  Continue the Singulair and the Flovent.  As needed albuterol.  Patient was told his symptoms should be improving over the next 1-2 weeks.  He should alert me if any worsening symptoms.    History of dysthymia and anxiety, controlled on Cymbalta.    Return in about 4 months (around 5/15/2018) for Recheck.   Patient Instructions   Lab work today to check potassium, kidney labs, hemoglobin, and thyroid.  INR checked today.    Discuss with cardiology the anticoagulation plan for your warfarin with the upcoming defibrillator/pacemaker battery change.    Routine follow-up with me in approximately 4 months.    Fredrick Wolff MD      Current Outpatient Prescriptions   Medication Sig Dispense Refill     nitroglycerin (NITROSTAT) 0.3 MG SL tablet Place 0.3 mg under the tongue every 5 (five) minutes as needed for chest pain.       amiodarone (PACERONE) 200 MG tablet TAKE 1 TABLET BY MOUTH EVERY DAY 90 tablet 1     azelastine (ASTELIN) 137 mcg (0.1 %) nasal spray 2 sprays into each nostril 2 (two) times a day. Use in each nostril as directed 30 mL 5     carvedilol (COREG) 12.5 MG tablet TAKE 1 TABLET BY MOUTH TWICE DAILY 180 tablet 3     dicyclomine (BENTYL) 10 MG capsule Take 10 mg by mouth 3 (three) times a day.       DULoxetine (CYMBALTA) 20 MG capsule TAKE 1 CAPSULE BY MOUTH ONCE DAILY 90 capsule 2     fluticasone (FLONASE) 50 mcg/actuation nasal spray SPRAY TWICE IN EACH  NOSTRIL ONCE A DAY 16 g 2     fluticasone (FLOVENT HFA) 220 mcg/actuation inhaler Inhale 2 puffs 2 (two) times a day. 1 Inhaler 5     furosemide (LASIX) 80 MG tablet Take 1 tablet in the morning and one half in the afternoon (Patient taking differently: Take 80 mg by mouth 2 (two) times a day at 9am and 6pm. Take 1 tablet in the morning and one half in the afternoon) 180 tablet 1     levothyroxine (SYNTHROID, LEVOTHROID) 50 MCG tablet TAKE 1 TABLET BY MOUTH EVERY DAY 90 tablet 3     lisinopril (PRINIVIL,ZESTRIL) 5 MG tablet Take 1 tablet (5 mg total) by mouth daily. 90 tablet 2     mesalamine (ASACOL HD) 800 mg TbEC EC tablet TK 3 Tabets PO BID  1     montelukast (SINGULAIR) 10 mg tablet Take 1 tablet (10 mg total) by mouth at bedtime. 90 tablet 3     omeprazole (PRILOSEC) 20 MG capsule TAKE 1 CAPSULE BY MOUTH ONCE DAILY 90 capsule 3     polyethylene glycol (MIRALAX) 17 gram packet Take 17 g by mouth 2 (two) times a day.       potassium chloride SA (K-DUR,KLOR-CON) 20 MEQ tablet TAKE 1 TABLET(20 MEQ) BY MOUTH DAILY 90 tablet 11     senna-docusate (SENNOSIDES-DOCUSATE SODIUM) 8.6-50 mg tablet Take 1 tablet by mouth 2 (two) times a day as needed for constipation.  0     tamsulosin (FLOMAX) 0.4 mg Cp24 TAKE 1 CAPSULE BY MOUTH DAILY AT DINNER 90 capsule 2     VENTOLIN HFA 90 mcg/actuation inhaler INHALE 2 PUFFS EVERY 6 HOURS AS NEEDED FOR WHEEZING 18 g 5     warfarin (COUMADIN) 5 MG tablet Take 1-1 1/2 tablets (5-7.5 mg) by mouth daily as directed. Adjust dose per INR results. 120 tablet 1     No current facility-administered medications for this visit.      Allergies   Allergen Reactions     Codeine      nausea     Social History   Substance Use Topics     Smoking status: Former Smoker     Packs/day: 1.00     Years: 25.00     Quit date: 11/6/2008     Smokeless tobacco: Never Used     Alcohol use No

## 2021-06-15 NOTE — TELEPHONE ENCOUNTER
Reason for Call:  Medication or medication refill:    Do you use a [facility]?  Name of the pharmacy and phone number for the current request: [facility]  [phone]    Name of the medication requested: levothyroxine (SYNTHROID, LEVOTHROID) 100 MCG tablet    Out of medication      Can we leave a detailed message on this number? Yes    Phone number patient can be reached at: Home number on file [phone] (home)    Best Time: any    Call taken on 2/26/2021 at 11:16 AM by Laura L Goldberg

## 2021-06-15 NOTE — PROGRESS NOTES
[facility] Heart Care Note    Assessment:  Congenital heart disease with ASD repair at age 3  Anomalous left superior vena cava  Advanced cardiomyopathy      To be nonischemic; coronary CT angiogram 2011 showed no coronary artery disease     Ejection fraction has been reduced for quite some time     Ejection fraction 19% by echocardiogram January 25, 2018: No appreciable change compared to May 28, 2017  Paroxysmal atrial fibrillation  Episodes of ventricular tachycardia  Chronic amiodarone therapy  Ulcerative colitis       Mesalamine therapy  CRTD; [employer], implanted November 6, 2008 now at [facility]       Low P-wave sensing      Plan is for generator replacement.  I do not think we could achieve decent P waves even if we reposition the atrial lead replaced no atrial lead.  The baseline electrocardiogram does not show P waves-he must be of such low voltage, they do show up on the device interrogation so I would not recommend trying to place a new atrial lead    Plan:  Your biventricular pacemaker defibrillator has battery depletion indicator and we will schedule you for ICD generator replacement  Slime Ramos will call about the schedule-anticipate this will be done next week  The new [employer] device will have a better battery and hands free home monitoring capabilities  I would not recommend any lead revisions just a generator change  This could be done as an outpatient at [facility]   but she will need a ride home  Recent bloods studies showed a good kidney panel and electrolytes and CBC were normal  Continue current medications  Hold warfarin for 1 day prior to procedure    Subjective:    I had the opportunity to see.Everton Larios , who is a 52 y.o. male with a known history of flex cardiac disease  He had congenital heart disease and ASD repair at age 3  His long history of cardiomyopathy  He had a [employer] biventricular pacemaker defibrillator implanted November 6, 2008.  The device  was placed in the right subclavicular region because he had anomalous left superior vena cava  He has never had a shock from his device  He has a history of ventricular tachycardia and also a history of paroxysmal atrial fibrillation.  He has been on amiodarone for a number of years  His advanced cardiomyopathy and ejection fractions have been around 30% or less for the past few years.  Most recent ejection fraction was 19%  Although he has a history congestive heart failure he has recently been well compensated having no particular breathlessness orthopnea PND or pedal edema  He does not feel palpitations and has had no syncopal or near syncopal episodes and has not had a shock from his device  He is on a comprehensive medical program and follows with Dr. Anton Ro on a periodic basis    We did EKG optimization today.  His baseline electrocardiogram showed an irregular heart rhythm pattern I could not see any P waves device interrogation did show that he had P waves with a pattern of AV Wenckebach his QRS was 164 ms.  Have some left bundle branch block, some right bundle branch block pattern  We tried optimization from the distal electrode, proximal electrode as well as various AV delays and V to V intervals.  I thought the best settings was LV -30 with AV delays of 200/180    P-wave sensing was quite low 0.4 but threshold was stable but elevated 1.7 at 1 V  Ventricular lead was excellent  LV lead had satisfactory pacing thresholds with no phrenic nerve stimulation      Problem List:  Patient Active Problem List   Diagnosis     Osteopenia     Shortness Of Breath     Subcutaneous Lipoma     Hypothyroidism     Cardiomyopathy     Pierce's Esophagus     Ulcerative Colitis     Paroxysmal Atrial Fibrillation     Atrial Flutter     Ventricular Tachycardia     Shoulder Tendonitis     Renal Insufficiency     ASD (atrial septal defect)     Cardiomyopathy     CRI (chronic renal insufficiency)     Coagulopathy     CRI  (chronic renal insufficiency), stage 2 (mild)     Tenosynovitis     Osteoarthritis     Primary osteoarthritis involving multiple joints     Chronic systolic CHF (congestive heart failure), NYHA class 2     Medication monitoring encounter     History of ventricular tachycardia     ICD (implantable cardioverter-defibrillator), biventricular, in situ     Hypothyroidism due to medication     Chronic seasonal allergic rhinitis     Medical History:  Past Medical History:   Diagnosis Date     Asthma      Atrial fibrillation      Pierce's esophagus      CHF (congestive heart failure)      Clotting disorder      Congenital cardiomyopathy in       History of anesthesia complications     nausea     History of transfusion      Hypothyroidism      ICD (implantable cardioverter-defibrillator) in place      Pacemaker      Ulcerative colitis      Ventricular tachycardia      Surgical History:  Past Surgical History:   Procedure Laterality Date     ASD REPAIR       CARDIAC DEFIBRILLATOR PLACEMENT       CARDIAC SURGERY       COLONOSCOPY N/A 2015    Procedure: COLONOSCOPY with biopsy;  Surgeon: Fernie Akers MD;  Location: [facility];  Service:      COLONOSCOPY N/A 2017    Procedure: COLONOSCOPY with biopsies and polypectomies using snare;  Surgeon: Gael Romero MD;  Location: [facility];  Service:      ESOPHAGOGASTRODUODENOSCOPY N/A 2017    Procedure: ESOPHAGOGASTRODUODENOSCOPY (EGD) with biopsies;  Surgeon: Gael Romero MD;  Location: [facility];  Service:      HAND SURGERY Left      LIPOMA RESECTION      x2     [place] REVISE MEDIAN N/CARPAL TUNNEL SURG  2016    Procedure: OPEN RIGHT CARPAL TUNNEL RELEASE CORTISONE INJECTION RIGHT THUMB;  Surgeon: Duong Santoyo MD;  Location: [facility] OR;  Service: Orthopedics     Social History:  Social History     Social History     Marital status: Single     Spouse name: N/A     Number of children: N/A     Years of education: N/A      Occupational History     Not on file.     Social History Main Topics     Smoking status: Former Smoker     Packs/day: 1.00     Years: 25.00     Quit date: 11/6/2008     Smokeless tobacco: Never Used     Alcohol use No     Drug use: No     Sexual activity: Not on file     Other Topics Concern     Not on file     Social History Narrative       Review of Systems:      General: Weight Loss  Eyes: WNL  Ears/Nose/Throat: WNL  Lungs: Shortness of Breath, Wheezing  Heart: Shortness of Breath with activity, Irregular Heartbeat  Stomach: Constipation, Diarrhea  Bladder: WNL  Muscle/Joints: WNL  Skin: WNL  Nervous System: Daytime Sleepiness  Mental Health: Depression     Blood: WNL        Family History:  Family History   Problem Relation Age of Onset     Hypertension Father          Allergies:  Allergies   Allergen Reactions     Codeine      nausea       Medications:  Current Outpatient Prescriptions   Medication Sig Dispense Refill     amiodarone (PACERONE) 200 MG tablet TAKE 1 TABLET BY MOUTH EVERY DAY 90 tablet 1     azelastine (ASTELIN) 137 mcg (0.1 %) nasal spray 2 sprays into each nostril 2 (two) times a day. Use in each nostril as directed 30 mL 5     carvedilol (COREG) 12.5 MG tablet TAKE 1 TABLET BY MOUTH TWICE DAILY 180 tablet 3     dicyclomine (BENTYL) 10 MG capsule Take 10 mg by mouth 3 (three) times a day.       DULoxetine (CYMBALTA) 20 MG capsule TAKE 1 CAPSULE BY MOUTH ONCE DAILY 90 capsule 2     fluticasone (FLONASE) 50 mcg/actuation nasal spray SPRAY TWICE IN EACH NOSTRIL ONCE A DAY 16 g 2     fluticasone (FLOVENT HFA) 220 mcg/actuation inhaler Inhale 2 puffs 2 (two) times a day. 1 Inhaler 5     furosemide (LASIX) 80 MG tablet Take 1 tablet in the morning and one half in the afternoon (Patient taking differently: Take 80 mg by mouth 2 (two) times a day at 9am and 6pm. Take 1 tablet in the morning and one half in the afternoon) 180 tablet 1     levothyroxine (SYNTHROID, LEVOTHROID) 50 MCG tablet TAKE 1  "TABLET BY MOUTH EVERY DAY 90 tablet 3     lisinopril (PRINIVIL,ZESTRIL) 5 MG tablet Take 1 tablet (5 mg total) by mouth daily. 90 tablet 2     mesalamine (ASACOL HD) 800 mg TbEC EC tablet TK 3 Tabets PO BID  1     montelukast (SINGULAIR) 10 mg tablet Take 1 tablet (10 mg total) by mouth at bedtime. 90 tablet 3     nitroglycerin (NITROSTAT) 0.3 MG SL tablet Place 0.3 mg under the tongue every 5 (five) minutes as needed for chest pain.       omeprazole (PRILOSEC) 20 MG capsule TAKE 1 CAPSULE BY MOUTH ONCE DAILY 90 capsule 3     polyethylene glycol (MIRALAX) 17 gram packet Take 17 g by mouth 2 (two) times a day.       potassium chloride SA (K-DUR,KLOR-CON) 20 MEQ tablet TAKE 1 TABLET(20 MEQ) BY MOUTH DAILY 90 tablet 11     senna-docusate (SENNOSIDES-DOCUSATE SODIUM) 8.6-50 mg tablet Take 1 tablet by mouth 2 (two) times a day as needed for constipation.  0     tamsulosin (FLOMAX) 0.4 mg Cp24 TAKE 1 CAPSULE BY MOUTH DAILY AT DINNER 90 capsule 2     VENTOLIN HFA 90 mcg/actuation inhaler INHALE 2 PUFFS EVERY 6 HOURS AS NEEDED FOR WHEEZING 18 g 5     warfarin (COUMADIN) 5 MG tablet Take 1-1 1/2 tablets (5-7.5 mg) by mouth daily as directed. Adjust dose per INR results. 120 tablet 1     No current facility-administered medications for this visit.          Surgical site  ICD is well-healed in right subclavicular region    Device interrogation  See above    Objective:   Vital signs:  /76 (Patient Site: Right Arm, Patient Position: Sitting, Cuff Size: Adult Regular)  Pulse 70  Resp 18  Ht 5' 4\" (1.626 m)  Wt 168 lb (76.2 kg)  BMI 28.84 kg/m2      Physical Exam:      GENERAL APPEARANCE: Alert, cooperative and in no acute distress.  HEENT: No scleral icterus. No Xanthelasma. Oral mucous membranes pink and moist.  NECK: JVP  Normal cm. No Hepatojugular reflux. Thyroid not  Palpable  CHEST: clear to auscultation and percussion  CARDIOVASCULAR: S1, S2 without murmur    Brachial, radial  pulses are intact and symmetric.   " No carotid bruits noted.  ABDOMEN: Non tender. BS+. No bruits.  EXTREMITIES: No cyanosis, clubbing or edema.    Lab Results:  LIPIDS:  Lab Results   Component Value Date    CHOL 201 (H) 09/30/2010     Lab Results   Component Value Date    HDL 58 09/30/2010     Lab Results   Component Value Date    LDLCALC 128.0 09/30/2010     Lab Results   Component Value Date    TRIG 75 09/30/2010     No components found for: CHOLHDL    BMP:  Lab Results   Component Value Date    CREATININE 1.11 01/15/2018    BUN 11 01/15/2018     01/15/2018    K 4.0 01/15/2018    CL 99 01/15/2018    CO2 27 01/15/2018         This note has been dictated using voice recognition software. Any grammatical or context distortions are unintentional and inherent to the software.  Celio Casas MD  [facility]  [phone]

## 2021-06-15 NOTE — PROGRESS NOTES
In clinic device check with Device Nurse and [employer] Rep followed by office visit with Dr. Casas.  Please see link for full device report.  Patient was informed of results and next follow up during today's visit.

## 2021-06-15 NOTE — PROGRESS NOTES
Low amoujnt of BIV%  Also with low P wave  Has not been seen by EP for quite a long time  Nearing ZAY-has now reaced ZAY  Plan  Schedule follow lup with me in device clinic < 1 month  Obtain Echocardiogram   Have [employer] present to do extensive testing   Need to further assess RA lead; decide if new RA lead needed  Why such low %

## 2021-06-15 NOTE — PROGRESS NOTES
Notes Recorded by Slime Ramos RN on 1/26/2018 at 7:47 AM  Noted.  Cady  ------    Notes Recorded by Celio Casas MD on 1/25/2018 at 4:18 PM  Abnormal lab  Low amoujnt of BIV%  Also with low P wave  Has not been seen by EP for quite a long time  Nearing ZAY  Plan  I will discuss at upcoming office visit  Scheduled for next week  I want [employer] present to do extensive testing  Nearing ZAY and concern about atrial lead  Have [employer] present to do extensive testing       Details        Reading Physician Reading Date Result Priority       Carmelo Moon) MD John 1/25/2018 Routine           Result Text               Left ventricle ejection fraction is severely decreased. The calculated left ventricular ejection fraction is 19%.    Mitral Valve: Normal mitral valve structure. Mild to moderate mitral regurgitation.    Left Atrium: Left atrial volume is severely increased.    Estimated central venous pressure equal to 13 mmHg.    Mild pulmonary hypertension present. The estimated systolic pulmonary artery pressure is 45 mmhg.    When compared to the previous study dated 5/8/2017, no significant change.

## 2021-06-16 NOTE — TELEPHONE ENCOUNTER
Telephone Encounter by Fredrick Wolff MD at 5/10/2019 11:14 AM     Author: Fredrick Wolff MD Service: -- Author Type: Physician    Filed: 5/10/2019 11:16 AM Encounter Date: 5/10/2019 Status: Signed    : Fredrick Wolff MD (Physician)       I received some faxes today asking for cosignature for IV antibiotic orders.    Patient was recently in the hospital [facility] for pneumonia associated with his post heart transplant condition.    I am not the treating physician for this condition.  Patient is cared for by his heart transplant team to the [facility].    I will be unable to sign his antibiotic orders as I am not the treating physician for that treatment.    I placed the requested paperwork in my out basket.  Any antibiotic orders will need to be from the transplant treating team at the [facility].    See the note from yesterday:     Debbie Subramanian RN   Registered Nurse      Progress Notes   Signed   Encounter Date:  5/9/2019               Signed                 []Hide copied text    []Oleg for details      Everton Larios arrived for Micafungin  IV infusion and Cefepime IV q 12 hr via CADD pump. Everton was released from the hospital yesterday. He reports she has past experience with home IV pumps. Lab results were reviewed. Everton Larios was educated on his plan of care. Each medication given today was reviewed prior to administration.  Micafungin 150 mg IV infusion was completed without problems. Cefepime 2 g Q 12 hr via CADD pump was initiated at 1356. PICC was placed yesterday at [facility]. Dressing clean dry and intact. IV site:PICC patent throughout treatment with positive blood return obtained before and at completion. IV site with no redness, swelling and drainage. Patient reports site  as was just placed yesterday. IV site flushed with saline and connected to CADD pump. Everton Larios has follow-up appointment scheduled daily. Everton Mckeonharmony was  discharged to home. He discharged from unit ambulatory with cane at 1410 with CADD pump infusing. The after visit summary was declined.      Debbie Subramanian

## 2021-06-16 NOTE — PROGRESS NOTES
DEVICE CLINIC RN POST-OP NOTE    Reason for visit: 1 week post op CRT-D generator change and wound check.      Device: [employer] G141 INOGEN CRT-D  Procedure date: 2/9/2018  Implant Diagnosis: NICM, Atrial fibrillation, Secondary Prevention  Implanting Physician: Dr. Celio Casas      Assessment  Subjective:   Vitals:   Vitals:    02/16/18 1017   BP: 90/66   Pulse: 72   Resp: 12   Temp: 97.4  F (36.3  C)     Incision/device pocket: The steri-strips were removed from the incision and it was cleansed with adhesive remover and alcohol wipes. The incision is clean, dry and intact. There are no signs of infection present. The incision is well healed. There is mild irritation and redness along the incision line that appears to be from the steri-strips. Advised to monitor to ensure the redness improves and when to contact us.       Device Findings  Interrogation of device reveals normal sensing and capture thresholds  See the Paceart Report for a full summary of the device information.        Patient Education    [facility]'s Partnership Agreement for Device Patients was presented and reviewed with the patient at today's appointment.    Signs and symptoms of infection, poor wound healing, and device function were reviewed. He was issued a Latitude NXT remote monitor and instructed on its set-up and use for remote monitoring by the [employer] representative prior to hospital discharge. These instructions were reviewed with the patient at today s visit.       Plan  Remote from home in 3 months on 5/15/2018    Susan Carbajal RN BSN PHN  Device Nurse   [facility]

## 2021-06-17 NOTE — PROGRESS NOTES
Chief Complaint:    Chief Complaint   Patient presents with     Cough     x 5 days. Hard to breath but better today with chest pressure   .    HPI:  Everton Larios is a 53 y.o. male who presents with a 6 day history of productive cough with yellow phelgm, subjective fever, runny nose and general malasie.  He also stated that he had left sided chest tightness/pressure at rest while watching TV Saturday and  not associated with radiation, exertion, or SOB which lasted 10-15 minutes.  He stated that He has chest tightness that comes and goes regardless of the recent cough.  But recently it has been constant. Denies chest pain now.  He stated that at night he is sweating but denies orthopnea or having to sleep propped up.  He states as far as he knows he has had no recent sick contacts and lives alone.  Pertinent negatives include: no chest pain with exertion, back pain, orthopnea, dysuria. Pertinent positives: include flatus, diarrhea on laxatives, denies sore throat, ear pain           Additional Review of Systems: Refer to HPI for pertinent findings.     Past Medical History:   Diagnosis Date     Asthma      Atrial fibrillation      Pierce's esophagus      CHF (congestive heart failure)      Clotting disorder      Congenital cardiomyopathy in       History of anesthesia complications     nausea     History of transfusion      Hypothyroidism      ICD (implantable cardioverter-defibrillator) in place      Pacemaker      Ulcerative colitis      Ventricular tachycardia        Past Surgical History:   Procedure Laterality Date     ASD REPAIR  1968     CARDIAC DEFIBRILLATOR PLACEMENT       CARDIAC SURGERY       COLONOSCOPY N/A 2015    Procedure: COLONOSCOPY with biopsy;  Surgeon: Fernie Akers MD;  Location: [facility];  Service:      COLONOSCOPY N/A 2017    Procedure: COLONOSCOPY with biopsies and polypectomies using snare;  Surgeon: Gael Romero MD;  Location: [facility];  Service:       ESOPHAGOGASTRODUODENOSCOPY N/A 12/19/2017    Procedure: ESOPHAGOGASTRODUODENOSCOPY (EGD) with biopsies;  Surgeon: Gael Romero MD;  Location: [facility];  Service:      HAND SURGERY Left      LIPOMA RESECTION      x2     [place] REVISE MEDIAN N/CARPAL TUNNEL SURG  9/21/2016    Procedure: OPEN RIGHT CARPAL TUNNEL RELEASE CORTISONE INJECTION RIGHT THUMB;  Surgeon: Duong Santoyo MD;  [facility];  Service: Orthopedics       Current Outpatient Prescriptions   Medication Sig Dispense Refill     amiodarone (PACERONE) 200 MG tablet TAKE 1 TABLET BY MOUTH EVERY DAY 90 tablet 0     azelastine (ASTELIN) 137 mcg (0.1 %) nasal spray 2 sprays into each nostril 2 (two) times a day. Use in each nostril as directed 30 mL 5     carvedilol (COREG) 12.5 MG tablet TAKE 1 TABLET BY MOUTH TWICE DAILY (Patient taking differently: TAKE 1 TABLET BY MOUTH DAILY) 180 tablet 3     dicyclomine (BENTYL) 10 MG capsule Take 10 mg by mouth 3 (three) times a day.       DULoxetine (CYMBALTA) 20 MG capsule TAKE 1 CAPSULE BY MOUTH ONCE DAILY 90 capsule 2     fluticasone (FLONASE) 50 mcg/actuation nasal spray SPRAY TWICE IN EACH NOSTRIL ONCE A DAY 16 g 2     fluticasone (FLOVENT HFA) 220 mcg/actuation inhaler Inhale 2 puffs 2 (two) times a day. 1 Inhaler 1     furosemide (LASIX) 80 MG tablet Take 1 tablet in the morning and one half in the afternoon (Patient taking differently: Take 80 mg by mouth 2 (two) times a day at 9am and 6pm. Take 1 tablet in the morning and one half in the afternoon) 180 tablet 1     levothyroxine (SYNTHROID, LEVOTHROID) 50 MCG tablet TAKE 1 TABLET BY MOUTH EVERY DAY 90 tablet 3     lisinopril (PRINIVIL,ZESTRIL) 5 MG tablet Take 1 tablet (5 mg total) by mouth daily. 90 tablet 2     mesalamine (ASACOL HD) 800 mg TbEC EC tablet TK 3 Tabets PO BID  1     montelukast (SINGULAIR) 10 mg tablet Take 1 tablet (10 mg total) by mouth at bedtime. 90 tablet 3     nitroglycerin (NITROSTAT) 0.3 MG SL tablet Place  0.3 mg under the tongue every 5 (five) minutes as needed for chest pain.       omeprazole (PRILOSEC) 20 MG capsule TAKE 1 CAPSULE BY MOUTH ONCE DAILY 90 capsule 3     polyethylene glycol (MIRALAX) 17 gram packet Take 17 g by mouth 2 (two) times a day.       potassium chloride SA (K-DUR,KLOR-CON) 20 MEQ tablet TAKE 1 TABLET(20 MEQ) BY MOUTH DAILY 90 tablet 11     senna-docusate (SENNOSIDES-DOCUSATE SODIUM) 8.6-50 mg tablet Take 1 tablet by mouth 2 (two) times a day as needed for constipation. (Patient taking differently: Take 1 tablet by mouth 3 (three) times a day. )  0     tamsulosin (FLOMAX) 0.4 mg Cp24 TAKE 1 CAPSULE BY MOUTH DAILY AT DINNER 90 capsule 2     VENTOLIN HFA 90 mcg/actuation inhaler INHALE 2 PUFFS EVERY 6 HOURS AS NEEDED FOR WHEEZING 18 g 5     warfarin (COUMADIN) 5 MG tablet Take 1-1 1/2 tablets (5-7.5 mg) by mouth daily as directed. Adjust dose per INR results. 120 tablet 1     No current facility-administered medications for this visit.        Allergies as of 04/02/2018 - Duong as Reviewed 04/02/2018   Allergen Reaction Noted     Codeine  08/29/2008         Family History   Problem Relation Age of Onset     Hypertension Father        Social History   Substance Use Topics     Smoking status: Former Smoker     Packs/day: 1.00     Years: 25.00     Quit date: 11/6/2008     Smokeless tobacco: Never Used     Alcohol use No          Exam:  /72  Pulse 87  Temp 98.2  F (36.8  C) (Oral)   Resp 16  Wt 165 lb (74.8 kg)  SpO2 96%  BMI 25.3 kg/m2  Gen: NAD  Vitals:    04/02/18 1049   BP: 126/72   Pulse: 87   Resp: 16   Temp: 98.2  F (36.8  C)   SpO2: 96%   heent: wnl  Pulm inp/exp wheezes throughout right lung files  Heart 64 paced  Abdomen: soft NT/ND +bs  Extremities: no rash no pedal edema  Skin no rash    xray findings:   Lab results:   EKG rate 64 Afib occ pvc paced   ASSESSMENT/PLAN:    Everton was seen today for cough and c/o chest pressure and tightness  CHEST PAIN  Discussed with the ED pt will  be seen for cardiac w/u.    Diagnoses and all orders for this visit:   to ED for w/u  EKG RATE 64 PACED  Cough   -CXR    No infiltrates  -     Wbc wnl  -                 Discussed  with the patient results the plan to be seen in the ED  Call or return to clinic prn if these symptoms worsen or fail to improve as anticipated.    Janine Ware MD  [facility] [place]

## 2021-06-17 NOTE — PROGRESS NOTES
[facility] Follow Up Note    Everton Larios   53 y.o. male    Date of Visit: 4/12/2018    Chief Complaint   Patient presents with     Follow-up     ED follow up for cough/asthma, cough and wheezing better over the past couple of days.     Subjective  Everton is here for follow-up of multiple medical problems.  His recent battery change of his dual-chamber pacemaker and AICD went fine in February.    He did develop a respiratory illness in late March and was seen in the ER on April 2, chest x-ray was negative for pneumonia or heart failure.  Negative influenza test, kidney and white count labs okay.  He was given a Z-Victor M and 5 days of prednisone 20 mg twice daily.  Over the past couple days he has felt much better and back to normal.  No longer on prednisone.    Cough is largely resolved.  No fever.  No significant shortness of breath just mild dyspnea on exertion which is baseline.    He still on the Flovent and albuterol for his asthma.  Still on Singulair and Flonase for his chronic sinusitis.  He quit smoking back in 2008.    Patient does have a history of severe congestive heart failure, systolic.  He has congenital right ventricular dysplasia history.  He had an angiogram back in 2011 was negative for coronary artery disease.    January 2018 cardiac echo showed ejection fraction of 19%, which was similar to previous year.  Mild to moderate mitral regurgitation, severely enlarged left atrium, mild pulmonary hypertension.    He still on carvedilol 12.5 mg twice daily.  Lasix is now 80 mg in the morning and 40 mg in the evening.  On lisinopril 2.5 mg a day.  Her graph patient was having problems with lower blood pressures and mildly elevated creatinine in the past, which led to the reduction lisinopril.  Baseline blood pressure around 100/50 where he is at now.    Earlier this month his BNP was 999.    No chest pain or chest pressure.    No firing of AICD.  He still on amiodarone 200 mg a day.   He has not had pulmonary fibrosis seen on PFTs in the past.  Again chest x-ray earlier this month was otherwise negative.    Past history of paroxysmal atrial fibrillation.  On chronic Coumadin.  No bleeding issues.  INR on April 2 was 2.49.    Patient was diagnosed with ulcerative pancolitis in 2005.  Recent colonoscopy December 2017 showed quiescent ulcerative colitis.  He is on Asacol twice daily.    He has been having some increased intermittent epigastric to right upper quadrant pain, worse after certain foods or meals.  He had an EGD December 2017 that did show Pierce's esophagus with hiatal hernia, mild gastropathy.  Omeprazole was increased to 40 mg a day.  He recently added some intermittent Pepto-Bismol, is trying to been eating smaller meals on a more regular basis.  That has helped his dyspepsia the most.  Having minimal symptoms over the past week.  No blood in stool or melena.    No recurrent diarrhea.    CT colonoscopy in January otherwise negative although not the optimal study.  Celiac antibody panel was negative and normal TSH earlier this month.    He has a mesenteric artery ultrasound that scheduled for later this month.    Additional lab workup from gastroenterology reviewed including a TSH that was down to 1.2 on April 4.  TSH was borderline high in January.    His creatinine was 0.9, potassium 3.9, hemoglobin 14.8 and AST 40.    He did tell him to return in 1 month to recheck his liver tests.    He had a vitamin D level of 17 and he is now on 50,000 international units vitamin D for 2 months before returning to the 2000 international units a day regular dose.    BPH is stable on Flomax.    Dysthymia stable on Cymbalta.    Denies missed doses of Synthroid.    No chest pain or chest pressure    PMHx:    Past Medical History:   Diagnosis Date     Asthma      Atrial fibrillation      Pierce's esophagus      CHF (congestive heart failure)      Clotting disorder      Congenital cardiomyopathy in  "      History of anesthesia complications     nausea     History of transfusion      Hypothyroidism      ICD (implantable cardioverter-defibrillator) in place      Pacemaker      Ulcerative colitis      Ventricular tachycardia      PSHx:    Past Surgical History:   Procedure Laterality Date     ASD REPAIR  1968     CARDIAC DEFIBRILLATOR PLACEMENT       CARDIAC SURGERY       COLONOSCOPY N/A 2015    Procedure: COLONOSCOPY with biopsy;  Surgeon: Fernie Akers MD;  Location: [facility];  Service:      COLONOSCOPY N/A 2017    Procedure: COLONOSCOPY with biopsies and polypectomies using snare;  Surgeon: Gael Romero MD;  Location: [facility];  Service:      ESOPHAGOGASTRODUODENOSCOPY N/A 2017    Procedure: ESOPHAGOGASTRODUODENOSCOPY (EGD) with biopsies;  Surgeon: Gael Romero MD;  Location: [facility];  Service:      HAND SURGERY Left      LIPOMA RESECTION      x2     [place] REVISE MEDIAN N/CARPAL TUNNEL SURG  2016    Procedure: OPEN RIGHT CARPAL TUNNEL RELEASE CORTISONE INJECTION RIGHT THUMB;  Surgeon: Duong Santoyo MD;  Location: [facility];  Service: Orthopedics     Immunizations:   Immunization History   Administered Date(s) Administered     Influenza, inj, historic,unspecified 10/22/2008, 2009, 2011     Influenza, seasonal,quad inj 36+ mos 2016, 10/16/2017     Influenza, seasonal,quad inj 6-35 mos 2012, 10/02/2014     Influenza,seasonal quad, PF, 36+MOS 10/02/2015     Tdap 2012       ROS A comprehensive review of systems was performed and was otherwise negative    Medications, allergies, and problem list were reviewed and updated    Exam  /68  Pulse (!) 49  Ht 5' 8\" (1.727 m)  Wt 161 lb (73 kg)  SpO2 97%  BMI 24.48 kg/m2  Alert and oriented ×3 with good mood and affect.  No jaundice.  Lungs are clear to auscultation without wheezing or crackles.  Heart is regular without murmur.  Abdomen is without significant " tenderness, just very mild tenderness in the epigastric area almost none.  No guarding.  Liver edge is normal.  No lower abdominal tenderness.  No ankle edema.    Assessment/Plan  1. Chronic systolic CHF (congestive heart failure), NYHA class 2  Compensated.  Increasing cough and shortness of breath earlier this month likely was suggestive of viral type URI with asthma exacerbation, now resolved after Z-Victor M and prednisone.    I discussed with him that increasing shortness of breath can sometimes be from his heart failure.  He did not appear to be the case this time, however.  Patient is back to baseline now.    Continue current medications, including the lower dose of lisinopril which was clarified on medical record.  - lisinopril (PRINIVIL,ZESTRIL) 5 MG tablet; Take 0.5 tablets (2.5 mg total) by mouth daily.  Dispense: 45 tablet; Refill: 1    2. Paroxysmal atrial fibrillation  Asymptomatic.  On carvedilol.  Long-term Coumadin.    INR check today, as the diabetic this month may have affected INR.    3. Hypothyroidism due to medication  Synthroid, TSH was rechecked on April 4 by gastroenterology and was normal.    4. Pierce's esophagus without dysplasia  Follow-up with GI.  Omeprazole 40 mg a day.  Consider repeat EGD in 3 years to monitor for dysplasia.    Intermittent epigastric pain I suspect his gastritis associated with bile reflux.  The Pepto-Bismol seems to be helping well with that.  I discussed eating good foods on a regular basis, follow-up if that worsens.  Continue omeprazole 40 mg a day.    He will be having a mesenteric artery ultrasound later this month, gastroenterology will manage those results.    5. Ulcerative colitis without complications, unspecified location  Managed by gastroneurology.  Asacol.    6. ICD (implantable cardioverter-defibrillator), biventricular, in situ  History of V. tach, on amiodarone.  No firing of AICD.  Managed by cardiology.    His last PFTs were June 2017 with mild  restrictive lung disease, no reversibility.  Diffusion capacity was normal.  No evidence of amiodarone toxicity.  Recent chest x-ray negative.  Could consider repeat PFTs this summer, or wait until next year.    7. Vitamin D deficiency  High-dose vitamin D for 2 months then returned to 2000 IUs a day.    Recent cough with respiratory illness and asthma exacerbation, resolved after Z-Victor M and prednisone.  Continue Flovent and albuterol.  Singulair and Flonase for sinusitis.    I did discuss with him the option for Prevnar 13 pneumonia vaccine, given his medical issues, he declined that today.  Consider giving at this summer.    He has had intermittent prednisone in the past, we discussed doing a bone density which he stated was scheduled through his gastroenterologist.    Return in about 4 months (around 8/12/2018) for Recheck.   Patient Instructions   No changes in treatment plan today.    INR today.    Routine four-month follow-up.    Fredrick Wolff MD  Total time with patient over 25 minutes and over 50% coord care.  Time all face to face.      Current Outpatient Prescriptions   Medication Sig Dispense Refill     amiodarone (PACERONE) 200 MG tablet TAKE 1 TABLET BY MOUTH EVERY DAY 90 tablet 0     azelastine (ASTELIN) 137 mcg (0.1 %) nasal spray 2 sprays into each nostril 2 (two) times a day. Use in each nostril as directed 30 mL 5     carvedilol (COREG) 12.5 MG tablet TAKE 1 TABLET BY MOUTH TWICE DAILY 180 tablet 3     cholecalciferol, vitamin D3, 400 unit Tab Take 400 Units by mouth daily.       dicyclomine (BENTYL) 10 MG capsule Take 10 mg by mouth 3 (three) times a day.       DULoxetine (CYMBALTA) 20 MG capsule TAKE 1 CAPSULE BY MOUTH ONCE DAILY 90 capsule 2     fluticasone (FLONASE) 50 mcg/actuation nasal spray SPRAY TWICE IN EACH NOSTRIL ONCE A DAY 16 g 2     fluticasone (FLOVENT HFA) 220 mcg/actuation inhaler Inhale 2 puffs 2 (two) times a day. 1 Inhaler 1     furosemide (LASIX) 80 MG tablet Take 1 tablet in  the morning and one half in the afternoon 180 tablet 1     levothyroxine (SYNTHROID, LEVOTHROID) 50 MCG tablet TAKE 1 TABLET BY MOUTH EVERY DAY 90 tablet 3     lisinopril (PRINIVIL,ZESTRIL) 5 MG tablet Take 0.5 tablets (2.5 mg total) by mouth daily. 45 tablet 1     mesalamine (ASACOL HD) 800 mg TbEC EC tablet TK 3 Tabets PO BID  1     montelukast (SINGULAIR) 10 mg tablet Take 1 tablet (10 mg total) by mouth at bedtime. 90 tablet 3     nitroglycerin (NITROSTAT) 0.3 MG SL tablet Place 0.3 mg under the tongue every 5 (five) minutes as needed for chest pain.       omeprazole (PRILOSEC) 20 MG capsule TAKE 1 CAPSULE BY MOUTH ONCE DAILY 90 capsule 3     polyethylene glycol (MIRALAX) 17 gram packet Take 17 g by mouth 2 (two) times a day.       potassium chloride SA (K-DUR,KLOR-CON) 20 MEQ tablet TAKE 1 TABLET(20 MEQ) BY MOUTH DAILY 90 tablet 11     senna-docusate (SENNOSIDES-DOCUSATE SODIUM) 8.6-50 mg tablet Take 1 tablet by mouth 2 (two) times a day as needed for constipation. (Patient taking differently: Take 1 tablet by mouth 3 (three) times a day. )  0     tamsulosin (FLOMAX) 0.4 mg Cp24 TAKE 1 CAPSULE BY MOUTH DAILY AT DINNER 90 capsule 2     VENTOLIN HFA 90 mcg/actuation inhaler INHALE 2 PUFFS EVERY 6 HOURS AS NEEDED FOR WHEEZING 18 g 5     warfarin (COUMADIN) 5 MG tablet Take 1-1 1/2 tablets (5-7.5 mg) by mouth daily as directed. Adjust dose per INR results. 120 tablet 1     No current facility-administered medications for this visit.      Allergies   Allergen Reactions     Codeine      nausea     Social History   Substance Use Topics     Smoking status: Former Smoker     Packs/day: 1.00     Years: 25.00     Quit date: 11/6/2008     Smokeless tobacco: Never Used     Alcohol use No

## 2021-06-17 NOTE — PATIENT INSTRUCTIONS - HE
Patient Instructions by Debbie Subramanian RN at 5/21/2019 10:30 AM     Author: Debbie Subramanian RN Service: -- Author Type: Registered Nurse    Filed: 5/21/2019 10:42 AM Encounter Date: 5/21/2019 Status: Addendum    : Debbie Subramanian RN (Registered Nurse)    Related Notes: Original Note by Debbie Subramanian RN (Registered Nurse) filed at 5/21/2019 10:40 AM       Patient Education     Preventing Falls in the Home  An adult or child can fall for many reasons. If you are an older adult, you may fall because your reaction time slows down. Your muscles and joints may get stiff, weak, or less flexible because of illness, medicines, or a physical condition. These things can also make a child more likely to fall or be injured in a fall.  Other health problems that make falls more likely include:    Arthritis    Dizziness or lightheadedness when you get out of bed (orthostatic hypotension)    History of a stroke    Dizziness    Anemia    Certain medicines taken for mental illness    Problems with balance or gait    History of falls with or without an injury    Changes in vision (vision impairment)    Changes in thinking skills and memory (cognitive impairment)  Injuries from a fall can include broken bones, dislocated joints, and cuts. When these injuries are serious enough, they can make it impossible for you or a child who is injured in a fall to live on his or her own.  Prevention tips  To help prevent falls and fall-related injuries, follow the tips below.   Floors  Make floors safer by doing the following:     Put nonskid pads under area rugs.    Remove throw rugs.    Replace worn floor coverings.    Tack carpets firmly to each step on carpeted stairs. Put nonskid strips on the edges of uncarpeted stairs.    Keep floors and stairs free of clutter and cords.    Arrange furniture so there are clear pathways.    Clean up any spills right away.    Wear shoes that fit.  Bathrooms    Make bathrooms safer by doing the  following:     Install grab bars in the tub or shower.    Apply nonskid strips or put a nonskid rubber mat in the tub or shower.    Sit on a bath chair to bathe.    Use bathmats with nonskid backing.  Lighting and the environment  Improve lighting in your home by doing the following:     Keep a flashlight in each room. Or put a lamp next to the bed within easy reach.    Put nightlights in the bedrooms, hallways, kitchen, and bathrooms.    Make sure all stairways have good lighting.    Take your time when going up and down stairs.    Put handrails on both sides of stairs and in walkways for more support. To prevent injury to your wrist or arm, dont use handrails to pull yourself up.    Install grab bars to pull yourself up.    Move or rearrange items that you use often. This will make them easier to find or reach.    Look at your home to find any safety hazards. Especially look at doorways, walkways, and the driveway. Remove or repair any safety problems that you find.  Date Last Reviewed: 8/1/2016 2000-2017 [employer]. [address] [place], [place] [address]. All rights reserved. This information is not intended as a substitute for professional medical care. Always follow your healthcare professional's instructions.         Patient Education     Preventing Falls in the Home  An adult or child can fall for many reasons. If you are an older adult, you may fall because your reaction time slows down. Your muscles and joints may get stiff, weak, or less flexible because of illness, medicines, or a physical condition. These things can also make a child more likely to fall or be injured in a fall.  Other health problems that make falls more likely include:    Arthritis    Dizziness or lightheadedness when you get out of bed (orthostatic hypotension)    History of a stroke    Dizziness    Anemia    Certain medicines taken for mental illness    Problems with balance or gait    History of falls with or  without an injury    Changes in vision (vision impairment)    Changes in thinking skills and memory (cognitive impairment)  Injuries from a fall can include broken bones, dislocated joints, and cuts. When these injuries are serious enough, they can make it impossible for you or a child who is injured in a fall to live on his or her own.  Prevention tips  To help prevent falls and fall-related injuries, follow the tips below.   Floors  Make floors safer by doing the following:     Put nonskid pads under area rugs.    Remove throw rugs.    Replace worn floor coverings.    Tack carpets firmly to each step on carpeted stairs. Put nonskid strips on the edges of uncarpeted stairs.    Keep floors and stairs free of clutter and cords.    Arrange furniture so there are clear pathways.    Clean up any spills right away.    Wear shoes that fit.  Bathrooms    Make bathrooms safer by doing the following:     Install grab bars in the tub or shower.    Apply nonskid strips or put a nonskid rubber mat in the tub or shower.    Sit on a bath chair to bathe.    Use bathmats with nonskid backing.  Lighting and the environment  Improve lighting in your home by doing the following:     Keep a flashlight in each room. Or put a lamp next to the bed within easy reach.    Put nightlights in the bedrooms, hallways, kitchen, and bathrooms.    Make sure all stairways have good lighting.    Take your time when going up and down stairs.    Put handrails on both sides of stairs and in walkways for more support. To prevent injury to your wrist or arm, dont use handrails to pull yourself up.    Install grab bars to pull yourself up.    Move or rearrange items that you use often. This will make them easier to find or reach.    Look at your home to find any safety hazards. Especially look at doorways, walkways, and the driveway. Remove or repair any safety problems that you find.  Date Last Reviewed: 8/1/2016 2000-2017 [employer]  [address] [place] [address]. All rights reserved. This information is not intended as a substitute for professional medical care. Always follow your healthcare professional's instructions.         Patient Education     Preventing Falls: Moving Safely Outside     When stepping off a curb with a walker, lower the walker onto the street first, then step off the curb.     Moving safely outside your home can be a challenge. Take care when walking up and down stairs and curbs. And be sure to wear sturdy, comfortable shoes and pay attention to where you step. Here are more tips to keep you from falling.  Using curbs and stairs  Curbs, steps, or uneven pavement can trip you. Take care when near them:    Check the height of a curb before stepping up or down. Be careful with uneven and cut-out sections of curbs.    Don't rush when crossing the street. Watch for changes in pavement height.    On stairs, grasp the handrail and take one step at a time. If you ever feel dizzy on stairs, sit down until you feel better.  Wearing shoes that keep you safe  When you shop for shoes, keep these things in mind:    Choose shoes with rubber or nonskid soles. Athletic shoes are a good choice.    Choose flats or shoes with low heels. Avoid high heels or platforms.    All footwear should be sturdy and well-fitting. Don't wear flip-flops or backless shoes or slippers.    Don't walk around in stocking feet. Shoes are your safest bet, even when indoors. If you like, keep a pair of shoes just for indoors.  Moving objects from place to place  Carrying objects can be hard, especially if you use a cane or walker. These tips can make it easier:    Use a rolling cart to carry things like groceries.    Wear clothes with large pockets for carrying small objects or wear a chuy pack.    Divide large loads into smaller loads. That way, you can always keep 1 hand free for grasping railings.    Don't carry objects that block your view. That's  a sure way to trip.   Date Last Reviewed: 5/1/2017 2000-2017 [employer] [address] [facility], [place] [address]. All rights reserved. This information is not intended as a substitute for professional medical care. Always follow your healthcare professional's instructions.

## 2021-06-17 NOTE — TELEPHONE ENCOUNTER
Refill Approved    Rx renewed per Medication Renewal Policy. Medication was last renewed on 10/5/20.    Duong Rhodes, [facility] Triage/Med Refill 5/11/2021     Requested Prescriptions   Pending Prescriptions Disp Refills     DULoxetine (CYMBALTA) 20 MG capsule [Pharmacy Med Name: DULOXETINE HCL DR 20 MG CAP] 30 capsule 5     Sig: TAKE 1 CAPSULE BY MOUTH EVERY DAY       Tricyclics/Misc Antidepressant/Antianxiety Meds Refill Protocol Passed - 5/11/2021 12:18 AM        Passed - PCP or prescribing provider visit in last year     Last office visit with prescriber/PCP: 1/4/2021 Fredrick Wolff MD OR same dept: 1/4/2021 Fredrick Wolff MD OR same specialty: 1/4/2021 Fredrick Wolff MD  Last physical: 2/4/2020 Last MTM visit: Visit date not found   Next visit within 3 mo: Visit date not found  Next physical within 3 mo: Visit date not found  Prescriber OR PCP: Fredrick Wolff MD  Last diagnosis associated with med order: 1. Dysthymia  - DULoxetine (CYMBALTA) 20 MG capsule [Pharmacy Med Name: DULOXETINE HCL DR 20 MG CAP]; TAKE 1 CAPSULE BY MOUTH EVERY DAY  Dispense: 30 capsule; Refill: 5    If protocol passes may refill for 12 months if within 3 months of last provider visit (or a total of 15 months).

## 2021-06-18 NOTE — LETTER
Letter by Gunnar Ro MD at      Author: Gunnar Ro MD Service: -- Author Type: --    Filed:  Encounter Date: 2/8/2019 Status: (Other)       Everton Larios  [address] [address]      February 8, 2019      Dear Everton,    This letter is to remind you that you will be due for your follow up appointment with Dr. Anton Ro  . To help ensure you are in the best health possible, a regular follow-up with your cardiologist is essential.     Please call our Patient Scheduling Line at [phone] to schedule your appointment at your earliest convenience.  If you have recently scheduled an appointment, please disregard this letter.    We look forward to seeing you again. As always, we are available at the number  above for any questions or concerns you may have.      Sincerely,     The Physicians and Staff of [facility]

## 2021-06-19 NOTE — PROGRESS NOTES
"Heart failure Education for Tandem Visits    Patient seen in clinic for HF education s/p recent hospital discharge 8-11-18.  Reviewed \"HF Sx Awareness & Action Plan\" handout and \"A Stronger Pump\" booklet highlighting:  patient's type of heart failure, importance of daily wts, Na management in diet, Fluid Guidelines, if applicable and medication review and importance of compliance     Instructed patient in signs and sx of heart failure, reiterated when to call clinic - reviewed HF hotline # and after hours call #.  Patient verbalized understanding of HF discussion and offered no questions/concerns - confirmed HF dx is not new.    Patient will plan to f/u in HF clinic in 2wks, has appt sched with Dr. Casas 9-13-18 and will f/u with Dr. Ro 10/2018.      Patient will meet with nurse clinician for continued education after next appt in HF clinic - will continue to reinforce HF management education with any future patient encounters.  mg      "

## 2021-06-19 NOTE — LETTER
Letter by Trisha Joel EPS at      Author: Trisha Joel EPS Service: -- Author Type: --    Filed:  Encounter Date: 9/27/2019 Status: Signed         Everton Larios  [address] [facility] [facility] [address]      September 27, 2019      Dear Mr. Larios,      We are writing to let you know that the remote monitor for your defibrillator is not connecting.     We called you about your disconnected monitor on 3/21/19 and 9/27/19. We left a phone message. Also sent letters 3/22/19, 4/29/19 and 7/30/19.    As soon as you can, please call our clinic so we can help you get your monitor re-connected.   Call [phone] and pick option 2.       Sincerely,    [facility]

## 2021-06-19 NOTE — LETTER
Letter by Rosalino Greer CNP at      Author: Rosalino Greer CNP Service: -- Author Type: --    Filed:  Encounter Date: 9/20/2019 Status: (Other)         September 20, 2019     Patient: Everton Larios   YOB: 1965   Date of Visit: 9/20/2019       To Whom It May Concern:    It is my medical opinion that Everton Larios needs to avoid working activities that involve prolonged standing or sitting.    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    If you have any questions or concerns, please don't hesitate to call.    Sincerely,        Electronically signed by Rosalino Greer CNP

## 2021-06-19 NOTE — PROGRESS NOTES
[facility] clinic Follow Up Note    Everton Larios   53 y.o. male    Date of Visit: 8/16/2018    Chief Complaint   Patient presents with     Hospital Visit Follow Up     CHF     Subjective  Everton Is here for hospital follow-up after hospitalization August 6 August 11 at [facility].  For congestive heart failure exacerbation.    Since review of medical record discharge summary and cardiology note from yesterday.  Review of lab work from yesterday as well as during hospitalization.    Radiology report reviewed from abdominal CT scan earlier this month in the hospital that showed small ascites small pleural effusion but otherwise negative.    Patient has a long history of congenital congestive heart failure.  He had congenital RV dysplasia.  He has had a history of ventricular tachycardia in the past with AICD, no recent events.  On amiodarone.  Also with paroxysmal atrial fibrillation and on chronic Coumadin.    Patient does not have coronary artery disease and had negative angiogram back in 2011.    January 2018 cardiac echo with ejection fraction 19%.  Mild to moderate mitral regurgitation and mild pulmonary hypertension.    Patient also has mild chronic asthma on Flovent and albuterol and Singulair.  Smoking in 2008    Past history of hypothyroidism.  Last TSH in January was mildly high.  Compliant with dosing.    Patient also has history of intermittent upper abdominal pain.  Past history of ulcerative colitis with ulcerative pancolitis in 2005 colonoscopy.  December 2017 colonoscopy showed quiescent ulcerative colitis, on Asacol twice daily.    EGD December 2017 did show some mild gastropathy with Pierce's esophagus and hiatal hernia.  He has been on omeprazole.    He has had continued episodes of epigastric pain and saw GI.  He had an abdominal arterial ultrasound was reported as normal in April.  Celiac sprue hypersensitivity tests were negative.    Patient did have a respiratory illness in  April that was largely resolved by this summer.    Patient was having increased abdominal bloating and shortness of breath symptoms.  Chest x-ray did show some tiny new bilateral pleural effusions, there is a questionable new infiltrate, that was felt not to be pneumonia after further evaluation and Zosyn was stopped after 48 hours.  No fever and normal white count and pro-calcitonin level.    Patient had his furosemide increased to 80 mg twice a day and spironolactone 50 mg a day added.    Continues on carvedilol 12.5 mg twice daily and lisinopril 2.5 mg a day.    Patient does run a low blood pressure at baseline but generally tolerates it.  No lightheaded dizzy spells.    No lower extremity edema.    Patient's abdominal pain is improving still some mild gastritis type symptoms and generalized bloating.  No blood in stool or melena.    Eating better now.  Still on omeprazole.    He was seen by cardiology yesterday.  Creatinine slightly higher at 1.3.  Normal sodium and potassium.  Blood pressure was down 80/60.  He had a spironolactone reduce to 25 mg a day.  He has a cardiology appointment September 13.    He is less dyspneic on exertion since discharge from the hospital when he was 6 pounds later since before admission.    Patient was evaluated in gastroenterology with a bone density scan in May that was reported as osteoporosis, and he was referred back to me for discussion on treatment options.  Patient also has a history of vitamin D deficiency and should be on a vitamin D supplement now.  Her graph patient is also having decreased mood earlier this spring and increased fluoxetine from 20 mg a day up to 30 mg a day.    PMHx:    Past Medical History:   Diagnosis Date     Arthritis     hands, neck     ASD (atrial septal defect)     s/p repair age 3     Asthma      Atrial fibrillation (H)      Pierce's esophagus      Cataract      CHF (congestive heart failure) (H)      Clotting disorder (H)      Congenital  "cardiomyopathy in  (H)      Depression      DJD (degenerative joint disease)     neck     History of anesthesia complications     nausea     History of transfusion      Hypothyroidism      ICD (implantable cardioverter-defibrillator) in place      Pacemaker      Ulcerative colitis (H)      Ventricular tachycardia (H)      PSHx:    Past Surgical History:   Procedure Laterality Date     ABLATION OF DYSRHYTHMIC FOCUS      for A fib     ASD REPAIR  1968     CARDIAC DEFIBRILLATOR PLACEMENT      x2--last was 2018     COLONOSCOPY N/A 2015    Procedure: COLONOSCOPY with biopsy;  Surgeon: Fernie Akers MD;  Location: [facility];  Service:      COLONOSCOPY N/A 2017    Procedure: COLONOSCOPY with biopsies and polypectomies using snare;  Surgeon: Gael Romero MD;  Location: [facility];  Service:      ESOPHAGOGASTRODUODENOSCOPY N/A 2017    Procedure: ESOPHAGOGASTRODUODENOSCOPY (EGD) with biopsies;  Surgeon: Gael Romero MD;  Location: [facility];  Service:      HAND SURGERY Left      LIPOMA RESECTION      x2     TN REVISE MEDIAN N/CARPAL TUNNEL SURG  2016    Procedure: OPEN RIGHT CARPAL TUNNEL RELEASE CORTISONE INJECTION RIGHT THUMB;  Surgeon: Duong Santoyo MD;  Location: [facility] OR;  Service: Orthopedics     Immunizations:   Immunization History   Administered Date(s) Administered     Influenza, inj, historic,unspecified 10/22/2008, 2009, 2011     Influenza, seasonal,quad inj 36+ mos 2016, 10/16/2017     Influenza, seasonal,quad inj 6-35 mos 2012, 10/02/2014     Influenza,seasonal quad, PF, 36+MOS 10/02/2015     Tdap 2012       ROS A comprehensive review of systems was performed and was otherwise negative    Medications, allergies, and problem list were reviewed and updated    Exam  BP 90/62  Pulse 69  Ht 5' 7.5\" (1.715 m)  Wt 151 lb (68.5 kg)  SpO2 98%  BMI 23.3 kg/m2  Appears well.  Alert and oriented ×3.  No jaundice.  No " JVD.  Lungs are clear no wheezing or crackles.  Good respiratory excursion.  Heart is regular with no murmur or gallop.  Has pacemaker.  There is no ankle edema.  Abdomen is does show some mild to moderate epigastric and bilateral upper abdominal tenderness but no guarding.  Did not feel hepatomegaly.    Assessment/Plan  1. Non-ischemic cardiomyopathy (H), unclear precipitating factors to this most recent CHF exacerbation.  May have been some dietary indiscretion.  He does have some mild pulmonary hypertension and lung disease he had a pulmonary exacerbation earlier this spring but is largely resolved.  He did feel somewhat bloated and with increased weight gain ever since then, however.      With recent exacerbation requiring hospitalization and increase of diuretics.  Now improved with 5-6 pound weight loss.    He is doing the [employer] phone calls daily.    Continue current 80 mg twice daily furosemide, but patient was warned about dehydration risk and syncope risk with that and if he develops increasing shortness of breath to contact clinic.    Continue on a lower dose of spironolactone which was lowered yesterday to 25 mg a day.  I did discuss risk of kidney failure and high potassium with patient today.    Continue on lisinopril 2.5 mg a day and the carvedilol 12.5 mg twice daily.    He does run a low blood pressure, but tolerating.    Follow-up with cardiology in September 13.    2. Paroxysmal atrial fibrillation (H)  He symptomatic with pacemaker.  Amiodarone.  On chronic Coumadin.  Patient was told to recheck an INR next week    Last pulmonary function test June 2017 with mild restrictive lung disease, no reversibility.  Diffusion capacity was normal.    3. ICD (implantable cardioverter-defibrillator), biventricular, in situ  History of V. tach but without recent recurrence.  On amiodarone.  Full code.    4. Hypothyroidism, unspecified type  On Synthroid.  TSH checked in April of this year by  gastroenterology was normal.  Check TSH at next lab draw next month.    5. Pierce's esophagus without dysplasia  Intermittent epigastric discomfort possibly related to gastritis or reflux.    As needed Pepto-Bismol does help.  Likely bile reflux.    Continue omeprazole regularly.  Repeat EGD December 2020 for 3 year recheck.    Abdominal CT scan was otherwise negative earlier this month in the hospital.    Appetite improving currently.  Extensive workup a GI earlier this spring otherwise negative.    6. Ulcerative colitis without complications, unspecified location (H)  Still in remission.  Asacol twice daily.    7. Mild persistent asthma without complication  Controlled.  Flovent, albuterol as needed, Singulair.    8. Osteoporosis, unspecified osteoporosis type, unspecified pathological fracture presence  Perigastric neurology testing.  I would have him consider seeing Dr. Garcia for osteoporosis consult to discuss Prolia initiation.    I do not believe he would tolerate Fosamax with his GI symptoms and Pierce's esophagus.    Vitamin D deficiency, will need to harm his vitamin D supplement next visit.    BPH, stable on Flomax.    Dysthymia and anxiety, chronic.  Continue duloxetine 30 mg a day which was recently increased.  I did review with patient there is a warning of increased ventricular tachycardia risk with that.    Return in about 4 weeks (around 9/13/2018) for Recheck.   Patient Instructions   No medication changes today.    Follow-up with me in 1 month.    We can discuss a referral to osteoporosis physician to discuss starting Prolia in the future for your osteoporosis.    Check INR next week.    Fredrick Wolff MD        Current Outpatient Prescriptions   Medication Sig Dispense Refill     albuterol (VENTOLIN HFA) 90 mcg/actuation inhaler INHALE 2 PUFFS EVERY 6 HOURS AS NEEDED FOR WHEEZING 1 Inhaler 0     amiodarone (PACERONE) 200 MG tablet TAKE 1 TABLET BY MOUTH EVERY DAY 90 tablet 0     azelastine  (ASTELIN) 137 mcg (0.1 %) nasal spray 2 sprays into each nostril 2 (two) times a day. Use in each nostril as directed 30 mL 5     carvedilol (COREG) 12.5 MG tablet TAKE 1 TABLET BY MOUTH TWICE DAILY 180 tablet 3     dicyclomine (BENTYL) 10 MG capsule Take 10 mg by mouth 3 (three) times a day as needed.        DULoxetine (CYMBALTA) 30 MG capsule Take 1 capsule (30 mg total) by mouth daily. 90 capsule 0     fluticasone (FLONASE) 50 mcg/actuation nasal spray Apply 2 sprays into each nostril daily.       fluticasone (FLOVENT HFA) 220 mcg/actuation inhaler INHALE 2 BY MOUTH PUFFS 2 TIMES A DAY. 1 Inhaler 5     furosemide (LASIX) 80 MG tablet Take 1 tablet (80 mg total) by mouth 2 (two) times a day at 9am and 6pm. 180 tablet 3     levothyroxine (SYNTHROID, LEVOTHROID) 50 MCG tablet TAKE 1 TABLET BY MOUTH EVERY DAY 90 tablet 3     lisinopril (PRINIVIL,ZESTRIL) 5 MG tablet Take 0.5 tablets (2.5 mg total) by mouth daily. (Patient taking differently: Take 2.5 mg by mouth every evening. ) 45 tablet 1     mesalamine (ASACOL HD) 800 mg TbEC EC tablet Take 2,400 mg by mouth 2 (two) times a day.       montelukast (SINGULAIR) 10 mg tablet TAKE 1 TABLET BY MOUTH AT BEDTIME 90 tablet 3     omeprazole (PRILOSEC) 40 MG capsule Take 40 mg by mouth every evening.       ondansetron (ZOFRAN-ODT) 4 MG disintegrating tablet Take 4 mg by mouth every 8 (eight) hours as needed.  0     polyethylene glycol (MIRALAX) 17 gram packet Take 17 g by mouth 4 (four) times a day.        potassium chloride (K-DUR,KLOR-CON) 20 MEQ tablet TAKE 1 TABLET(20 MEQ) BY MOUTH DAILY 90 tablet 1     simethicone (GAS-X) 125 mg cap capsule Take 125 mg by mouth 4 (four) times a day as needed for flatulence.       spironolactone (ALDACTONE) 50 MG tablet Take 0.5 tablets (25 mg total) by mouth daily. 45 tablet 3     tamsulosin (FLOMAX) 0.4 mg Cp24 TAKE 1 CAPSULE BY MOUTH DAILY AT DINNER 90 capsule 2     warfarin (COUMADIN) 5 MG tablet Take 2.5-5 mg by mouth See Admin  Instructions. Take 2.5 mg on Sunday and Wednesday; take 5mg the rest of the week       No current facility-administered medications for this visit.      Allergies   Allergen Reactions     Codeine      nausea     Social History   Substance Use Topics     Smoking status: Former Smoker     Packs/day: 1.00     Years: 25.00     Quit date: 11/6/2008     Smokeless tobacco: Former User     Alcohol use 3.6 oz/week     6 Standard drinks or equivalent per week      Comment: 2 drinks 3 times weekly when out with friends

## 2021-06-19 NOTE — LETTER
Letter by Jory Lindsay EPS at      Author: Jory Lindsay EPS Service: -- Author Type: --    Filed:  Encounter Date: 4/23/2019 Status: (Other)         April 23, 2019      Dear Dr. Wolff,   While attending Outpatient Cardiac Rehabilitation, your patient Everton Larios, 1965, completed the [employer] Depression Scale (ALONA-D) Survey.  This survey is a tool used to identify depression in patients.  Your patient scored 21, which is in the mild to moderate  / possibility of major depression category.  Research indicates that depression can be a contributing factor of secondary heart events and subsequent hospitalizations.    The ALONA-S is a survey of twenty (20) questions given to patients at the beginning and end of their rehabilitation program.  The survey is self-administered by the patient or interview administered by a health care provider.  The survey scores the twenty different questions from '0' (rarely or none of the time) up to '3' (most or all of the time).  The final score is a summation of the twenty questions, with a higher score indicating a higher severity of depression.    Because of this score, we will encourage your patient to:    1. Attend the educational sessions offered by our Cardiac Rehabilitation Department on stress management, relaxation techniques, and the emotional aspects of heart disease.    2. Borrow books or videos on depression, anxiety, and stress management available in the Cardiac Rehabilitation library.    3. Confide in his support persons, such as family, friends, clergy, or professional counseling services.    4. Make an appointment with you for follow-up.    Please feel free to contact us for further discussion.    Sincerely,    The Cardiac Rehabilitation Staff  [facility]  [address] [place] [address]  Phone Number:  [phone]

## 2021-06-19 NOTE — PROGRESS NOTES
Everton is a 53-year-old male patient of Dr. Wolff with a history of chronic abdominal pain, severe congestive heart failure, and atrial fibrillation who presents today for follow-up of his abdominal pain.  His primary care physician is on vacation currently and thus he elected to see me.  He was seen in the emergency room on 6/30 with complaints of abdominal pain and bloating.  Abdominal x-ray showed no evidence of free air or obstruction.  Lipase, lactate, CBC, and troponin were negative.  He received IV fluids and Toradol which improved his pain.  He was then told to follow-up with his primary care physician.  Currently Everton says his pain is improved and essentially back to baseline.  He has seen gastroenterology hand has undergone numerous tests, all of which have been unremarkable.  Shunt is convinced that he has anxiety which is contributing to, if not outright causing, his abdominal pain.  He states that when he gets anxious or worked up his abdominal pain worsens.  He is interested in medication today.    Objective: Vital signs are as per the EMR.  In general the patient is alert pleasant and in no acute distress.  He appears healthy.    Assessment and plan: Chronic abdominal pain, etiology unknown, potentially secondary to anxiety.  I told Everton that abdominal pain as a physical manifestation of anxiety was quite common and it would be worthwhile to treat this to see if this would improve.  I initially wanted to put him on Celexa, but given the chance of QT prolongation along with his amiodarone I elected to not do this in consultation with pharmacy.  I am going to have him increase his Cymbalta to 30 mg per day.  He will follow-up with Dr. Wolff in 3-4 weeks.

## 2021-06-19 NOTE — LETTER
Letter by Fredrick Wolff MD at      Author: Fredrick Wolff MD Service: -- Author Type: --    Filed:  Encounter Date: 5/21/2019 Status: (Other)         Everton Larios  [address] [facility] [address]             May 21, 2019         Dear Mr. Larios,    Below are the results from your recent visit:    Resulted Orders   Thyroid Stimulating Hormone (TSH)   Result Value Ref Range    TSH 2.67 0.30 - 5.00 uIU/mL   Basic Metabolic Panel   Result Value Ref Range    Sodium 135 (L) 136 - 145 mmol/L    Potassium 4.4 3.5 - 5.0 mmol/L    Chloride 106 98 - 107 mmol/L    CO2 16 (L) 22 - 31 mmol/L    Anion Gap, Calculation 13 5 - 18 mmol/L    Glucose 148 (H) 70 - 125 mg/dL    Calcium 9.2 8.5 - 10.5 mg/dL    BUN 52 (H) 8 - 22 mg/dL    Creatinine 2.33 (H) 0.70 - 1.30 mg/dL    GFR MDRD Af Amer 35 (L) >60 mL/min/1.73m2    GFR MDRD Non Af Amer 29 (L) >60 mL/min/1.73m2    Narrative    Fasting Glucose reference range is 70-99 mg/dL per  American Diabetes Association (ADA) guidelines.   CMV DNA, Quantitative, PCR   Result Value Ref Range    CMV DNA Specimen Type Plasma     CMV Quant IU/Ml CMV DNA Not Detected CMVND [IU]/mL      Comment:      Mutations within the highly conserved regions of the viral genome covered by  the ALESSIA AmpliPrep/ALESSIA TaqMan CMV Test primers and/or probes have been  identified and may result in under-quantitation of or failure to detect the  virus.  Supplemental testing methods should be used for testing when this is  suspected.  The ALESSIA AmpliPrep/ALESSIA TaqMan CMV Test is an FDA-approved in vitro nucleic  acid amplification test for the quantitation of cytomegalovirus DNA in human  plasma (EDTA plasma) using the ALESSIA AmpliPrep Instrument for automated viral  nucleic acid extraction and the ALESSIA TaqMan Analyzer or ALESSIA TaqMan for  automated Real Time amplification and detection of the viral nucleic acid  target.  Titer results are reported in International Units/mL (IU/mL using 1st WHO  International  standard for Human Cytomegalovirus for Nucleic Acid Amplification  based assays. The conversion factor between CMV DNA copis/mL (as defined by the  Roche ALESSIA TaqMan CMV test) and International   Units is the CMV DNA  concentration in IU/mL x 1.1 copies/IU = CMV DNA in copies/mL.  This assay has received FDA approval for the testing of human plasma only. The  Infectious Disease Diagnostic Laboratory at the [facility], [place], has validated the performance characteristics of the Roche  CMV assay for plasma, bronchial alveolar lavage/wash and urine.    Log  IU/mL of CMVQNT Not Calculated <2.1 [Log_IU]/mL      Comment:      Performed and/or entered by:  [place] [id number](CBC w/o Differential)   Result Value Ref Range    WBC 8.5 4.0 - 11.0 thou/uL    RBC 3.02 (L) 4.40 - 6.20 mill/uL    Hemoglobin 9.0 (L) 14.0 - 18.0 g/dL    Hematocrit 29.2 (L) 40.0 - 54.0 %    MCV 97 80 - 100 fL    MCH 29.8 27.0 - 34.0 pg    MCHC 30.8 (L) 32.0 - 36.0 g/dL    RDW 17.0 (H) 11.0 - 14.5 %    Platelets 309 140 - 440 thou/uL    MPV 9.5 8.5 - 12.5 fL       Significant increase in your creatinine.    Significant decrease in hemoglobin.    Discuss these lab results with your transplant team.    Please call with questions or contact us using [employer].    Sincerely,        Electronically signed by Fredrick Wolff MD

## 2021-06-19 NOTE — PROGRESS NOTES
"TCM DISCHARGE FOLLOW UP CALL    Discharge Date:  8/11/2018  Reason for hospital stay (discharge diagnosis)::  Acute On Chronic Systolic Congestive Heart Failure  Are you feeling better, the same or worse since your discharge?:  Patient is feeling better (States he's still losing \"water wt,\" he is down 3# from yesterday, but the 1st couple days home his wt hadn't changed.  Breathing improved, \"less sob each day.\"  Appetite is getting better)  Do you feel like you have a plan in the event of a health emergency?: Yes (call 911.  RN informed pt of 24/7 nurse triage services through clinic)    As part of your discharge plan, were  home care services ordered for you?: No    Did you receive any new medications, or was there a change to your medications?: Yes    Are you taking those medications, or do you have any established regiment?:  Yes - New rx: Spironolactone 50 mg daily.  Dose change:  Furosemide was increased to 80 mg two times a day.  Taking as prescribed.  Do you have any follow up visits scheduled with your PCP or Specialist?:  Yes, with PCP and Yes, with Specialist (Dr. Wolff 8/16/18)  (RN) Is PCP appt scheduled soon enough (within 14 days of discharge date)?: Yes    Who are you seeing and when is it scheduled?:  Bhumika Vera CNP ([facility])   8/15/2018  RN NOTES::  RN reminded pt to continue daily wts, & following a low sodium diet.    Jory Hedrick RN Care Manager, Population Health    "

## 2021-06-19 NOTE — PROGRESS NOTES
Chief complaint: Follow-up asthma    History of present illness: This is a pleasant 53-year-old gentleman with a history of moderate persistent asthma here today for follow-up visit.  I saw him last in November.  He does have a history of allergic rhinitis as well.  He currently is on Flovent 220 mcg 2 puffs twice daily, but he ran out of this medication last Saturday.  Since bring on the medication, he had increased cough, wheeze or shortness of breath.  Prior to running out of this medication, he was still needing his albuterol inhaler at least once per day.  He does admit to some nighttime awakenings as well.  He reports he would have shortness of breath and wheezing.  This spring he did notice increased congestion due to his allergies.  Currently, he has no nasal congestion.    Past medical history, social history, family medical history, meds and allergies reviewed and updated accordingly.      Review of Systems performed as above and the remainder is negative.       Current Outpatient Prescriptions:      albuterol (VENTOLIN HFA) 90 mcg/actuation inhaler, INHALE 2 PUFFS EVERY 6 HOURS AS NEEDED FOR WHEEZING, Disp: 1 Inhaler, Rfl: 0     amiodarone (PACERONE) 200 MG tablet, TAKE 1 TABLET BY MOUTH EVERY DAY, Disp: 90 tablet, Rfl: 0     azelastine (ASTELIN) 137 mcg (0.1 %) nasal spray, 2 sprays into each nostril 2 (two) times a day. Use in each nostril as directed, Disp: 30 mL, Rfl: 5     carvedilol (COREG) 12.5 MG tablet, TAKE 1 TABLET BY MOUTH TWICE DAILY, Disp: 180 tablet, Rfl: 3     dicyclomine (BENTYL) 10 MG capsule, Take 10 mg by mouth 3 (three) times a day., Disp: , Rfl:      DULoxetine (CYMBALTA) 20 MG capsule, TAKE 1 CAPSULE BY MOUTH ONCE DAILY, Disp: 90 capsule, Rfl: 2     fluticasone (FLONASE) 50 mcg/actuation nasal spray, SPRAY TWICE IN EACH NOSTRIL ONCE A DAY, Disp: 16 g, Rfl: 2     fluticasone (FLOVENT HFA) 220 mcg/actuation inhaler, INHALE 2 BY MOUTH PUFFS 2 TIMES A DAY., Disp: 1 Inhaler, Rfl: 5      "furosemide (LASIX) 80 MG tablet, Take 1 tablet (80 mg total) by mouth 2 (two) times a day., Disp: 180 tablet, Rfl: 0     levothyroxine (SYNTHROID, LEVOTHROID) 50 MCG tablet, TAKE 1 TABLET BY MOUTH EVERY DAY, Disp: 90 tablet, Rfl: 3     lisinopril (PRINIVIL,ZESTRIL) 5 MG tablet, Take 0.5 tablets (2.5 mg total) by mouth daily., Disp: 45 tablet, Rfl: 1     mesalamine (ASACOL HD) 800 mg TbEC EC tablet, TK 3 Tabets PO BID, Disp: , Rfl: 1     montelukast (SINGULAIR) 10 mg tablet, TAKE 1 TABLET BY MOUTH AT BEDTIME, Disp: 90 tablet, Rfl: 3     omeprazole (PRILOSEC) 20 MG capsule, TAKE 1 CAPSULE BY MOUTH ONCE DAILY, Disp: 90 capsule, Rfl: 3     ondansetron (ZOFRAN-ODT) 4 MG disintegrating tablet, Take 4 mg by mouth every 8 (eight) hours as needed., Disp: , Rfl: 0     polyethylene glycol (MIRALAX) 17 gram packet, Take 17 g by mouth 2 (two) times a day., Disp: , Rfl:      potassium chloride 20 mEq TbER, Take 1 tablet by mouth daily., Disp: , Rfl: 8     potassium chloride SA (K-DUR,KLOR-CON) 20 MEQ tablet, TAKE 1 TABLET(20 MEQ) BY MOUTH DAILY, Disp: 90 tablet, Rfl: 11     tamsulosin (FLOMAX) 0.4 mg Cp24, TAKE 1 CAPSULE BY MOUTH DAILY AT DINNER, Disp: 90 capsule, Rfl: 2     warfarin (COUMADIN) 5 MG tablet, Take 1-1 1/2 tablets (5-7.5 mg) by mouth daily as directed. Adjust dose per INR results., Disp: 120 tablet, Rfl: 1    Allergies   Allergen Reactions     Codeine      nausea       /62  Ht 5' 8\" (1.727 m)  Wt 163 lb (73.9 kg)  BMI 24.78 kg/m2  Gen: Pleasant male not in acute distress  HEENT: Eyes no erythema of the bulbar or palpebral conjunctiva, no edema. Ears: TMs well visualized, no effusions. Nose: No congestion, mucosa normal. Mouth: Throat clear, no lip or tongue edema.   Cardiac: Regular rate and rhythm, no murmurs, rubs or gallops  Respiratory: Clear to auscultation bilaterally, no adventitious breath sounds    Skin: No rashes or lesions  Psych: Alert and oriented times 3    Impression report and plan  1.  " Allergic rhinitis  2.  Moderate persistent asthma    I renewed his Flovent 210 micro grams 2 puffs twice daily.  Stated that goal use for albuterol is less than 2 times per week outside exercise.  If his asthma continues to be under poor control, I would consider adding Spiriva.  I did talk with him about biologic agent such as Xolair or Fasenra, however, these would require blood work and consent.  He will notify me in the next month if his symptoms do not improve, otherwise, follow-up in 6 months.    Time spent with patient, 25 minutes, greater than half spent counseling and coordination of care regarding asthma and allergies.

## 2021-06-20 NOTE — PROGRESS NOTES
[facility] Heart Care Note    Assessment:  Congenital heart disease with ASD repair at age 3  Anomalous left superior vena cava  Advanced cardiomyopathy      nonischemic; coronary CT angiogram 2011 showed no coronary artery disease     Ejection fraction has been reduced for quite some time     Ejection fraction 19% by echocardiogram January 25, 2018: No appreciable change compared to May 28, 2017     Echocardiogram August 7, 2018; ejection fraction 60% with mild to moderate mitral regurgitation  Paroxysmal atrial fibrillation  Episodes of ventricular tachycardia  Chronic amiodarone therapy  Ulcerative colitis       Mesalamine therapy  CRTD; [employer], implanted November 6, 2008        ICD generator replacement February 9, 2018       Low P-wave sensing; no atrial lead was not placed since I felt the atrium had poor tissue      Plan:    Blood pressure is quite low today only 80  Stop lisinopril  Reduce carvedilol to 12.5 mg twice daily  Blood work today will include a basic metabolic profile  Continue spironolactone 25 mg daily  ICD evaluation today is excellent no changes were made, device should last another 8 years  Have device check through transtelephonic monitoring every 3 months  Continue close follow-up with Dr. Whitmore, Dr. Ro in CHF clinic  Follow through with the advanced heart failure clinic at the [facility]      Subjective:    I had the opportunity to see.Everton Larios , who is a 53 y.o. male with a known history of advanced nonischemic cardiomyopathy    Everton was hospitalized August 6, 2018 with severe heart failure and had about a 20 pound diuresis.  He did not have edema but did have some swelling of his abdomen and a CT scan did show some ascites; he responded very well to the diuretic program  He was very weak but now seems to have improved his energy, can now do a few tasks around the house and can walk pretty comfortably  Was recently seen in heart failure clinic and was  advised to go to [facility] to have  An echocardiogram 2018 showed ejection fraction of 16% there is mild to moderate mitral regurgitation  Chest x-ray showed cardiac enlargement with mild to moderate pulmonary congestion    His fluid level seem well controlled now, he does check a daily weight and seems to be pretty stable around 147 pounds  Previously his appetite was quite poor but now seems improved  Ulcerative colitis seems controlled  He is felt no palpitations, no syncopal or near syncopal episodes  No angina  His last blood test showed elevated potassium              Problem List:  Patient Active Problem List   Diagnosis     Osteopenia     Hypothyroidism     Non-ischemic cardiomyopathy (H)     Pierce's Esophagus     Ulcerative Colitis     Paroxysmal Atrial Fibrillation     Atrial Flutter     Ventricular Tachycardia     ASD (atrial septal defect)     Primary osteoarthritis involving multiple joints     ICD (implantable cardioverter-defibrillator), biventricular, in situ     Chronic seasonal allergic rhinitis     CHF (congestive heart failure) (H)     Acute on chronic systolic congestive heart failure (H)     Community acquired pneumonia, unspecified laterality     Mild persistent asthma without complication     Dysthymia     Medical History:  Past Medical History:   Diagnosis Date     Arthritis     hands, neck     ASD (atrial septal defect)     s/p repair age 3     Asthma      Atrial fibrillation (H)      Pierce's esophagus      Cataract      CHF (congestive heart failure) (H)      Clotting disorder (H)      Congenital cardiomyopathy in  (H)      Depression      DJD (degenerative joint disease)     neck     History of anesthesia complications     nausea     History of transfusion      Hypothyroidism      ICD (implantable cardioverter-defibrillator) in place      Pacemaker      Ulcerative colitis (H)      Ventricular tachycardia (H)      Surgical History:  Past Surgical History:    Procedure Laterality Date     ABLATION OF DYSRHYTHMIC FOCUS      for A fib     ASD REPAIR  1968     CARDIAC DEFIBRILLATOR PLACEMENT      x2--last was Feb 2018     COLONOSCOPY N/A 2/5/2015    Procedure: COLONOSCOPY with biopsy;  Surgeon: Fernie Akers MD;  Location: [facility];  Service:      COLONOSCOPY N/A 12/19/2017    Procedure: COLONOSCOPY with biopsies and polypectomies using snare;  Surgeon: Gael Romero MD;  Location: [facility];  Service:      ESOPHAGOGASTRODUODENOSCOPY N/A 12/19/2017    Procedure: ESOPHAGOGASTRODUODENOSCOPY (EGD) with biopsies;  Surgeon: Gael Romero MD;  Location: [facility];  Service:      HAND SURGERY Left      LIPOMA RESECTION      x2     AZ REVISE MEDIAN N/CARPAL TUNNEL SURG  9/21/2016    Procedure: OPEN RIGHT CARPAL TUNNEL RELEASE CORTISONE INJECTION RIGHT THUMB;  Surgeon: Duong Santoyo MD;  Location: [facility];  Service: Orthopedics     Social History:  Social History     Social History     Marital status: Single     Spouse name: N/A     Number of children: N/A     Years of education: N/A     Occupational History     Not on file.     Social History Main Topics     Smoking status: Former Smoker     Packs/day: 1.00     Years: 25.00     Quit date: 11/6/2008     Smokeless tobacco: Former User     Alcohol use 3.6 oz/week     6 Standard drinks or equivalent per week      Comment: 2 drinks 3 times weekly when out with friends     Drug use: No     Sexual activity: Not on file     Other Topics Concern     Not on file     Social History Narrative    He works part-time at [employer].       Review of Systems:      General: WNL  Eyes: WNL  Ears/Nose/Throat: WNL  Lungs: WNL  Heart: WNL  Stomach: WNL  Bladder: WNL  Muscle/Joints: WNL  Skin: WNL  Nervous System: WNL  Mental Health: WNL     Blood: WNL        Family History:  Family History   Problem Relation Age of Onset     Hypertension Father      Endometrial cancer Mother      Endometrial cancer Maternal  Grandmother          Allergies:  Allergies   Allergen Reactions     Codeine      nausea       Medications:  Current Outpatient Prescriptions   Medication Sig Dispense Refill     albuterol (VENTOLIN HFA) 90 mcg/actuation inhaler INHALE 2 PUFFS EVERY 6 HOURS AS NEEDED FOR WHEEZING 1 Inhaler 0     amiodarone (PACERONE) 200 MG tablet TAKE 1 TABLET BY MOUTH EVERY DAY 90 tablet 0     azelastine (ASTELIN) 137 mcg (0.1 %) nasal spray 2 sprays into each nostril 2 (two) times a day. Use in each nostril as directed 30 mL 5     carvedilol (COREG) 12.5 MG tablet TAKE 1 TABLET BY MOUTH TWICE DAILY 180 tablet 3     carvedilol (COREG) 3.125 MG tablet Take with one 12.5 mg tablet to take 15.625 mg twice a day 180 tablet 3     DULoxetine (CYMBALTA) 30 MG capsule Take 1 capsule (30 mg total) by mouth daily. 90 capsule 0     fluticasone (FLONASE) 50 mcg/actuation nasal spray Apply 2 sprays into each nostril daily.       fluticasone (FLOVENT HFA) 220 mcg/actuation inhaler INHALE 2 BY MOUTH PUFFS 2 TIMES A DAY. 1 Inhaler 5     furosemide (LASIX) 80 MG tablet Take 80 mg in the morning and 40 mg in the afternoon 180 tablet 3     levothyroxine (SYNTHROID, LEVOTHROID) 50 MCG tablet TAKE 1 TABLET BY MOUTH EVERY DAY 90 tablet 3     lisinopril (PRINIVIL,ZESTRIL) 5 MG tablet Take 0.5 tablets (2.5 mg total) by mouth daily. (Patient taking differently: Take 2.5 mg by mouth every evening. ) 45 tablet 1     mesalamine (ASACOL HD) 800 mg TbEC EC tablet Take 2,400 mg by mouth 2 (two) times a day.       montelukast (SINGULAIR) 10 mg tablet TAKE 1 TABLET BY MOUTH AT BEDTIME 90 tablet 3     omeprazole (PRILOSEC) 40 MG capsule Take 40 mg by mouth every evening.       ondansetron (ZOFRAN-ODT) 4 MG disintegrating tablet Take 4 mg by mouth every 8 (eight) hours as needed.  0     polyethylene glycol (MIRALAX) 17 gram packet Take 17 g by mouth 4 (four) times a day.        potassium chloride (K-DUR,KLOR-CON) 20 MEQ tablet TAKE 1 TABLET(20 MEQ) BY MOUTH DAILY 90  "tablet 1     simethicone (GAS-X) 125 mg cap capsule Take 125 mg by mouth 4 (four) times a day as needed for flatulence.       spironolactone (ALDACTONE) 50 MG tablet Take 0.5 tablets (25 mg total) by mouth daily. 45 tablet 3     tamsulosin (FLOMAX) 0.4 mg Cp24 TAKE 1 CAPSULE BY MOUTH DAILY AT DINNER 90 capsule 2     warfarin (COUMADIN) 5 MG tablet Take 2.5-5 mg by mouth See Admin Instructions. Take 2.5 mg on Sunday and Wednesday; take 5mg the rest of the week       dicyclomine (BENTYL) 10 MG capsule Take 10 mg by mouth 3 (three) times a day as needed.        DULoxetine (CYMBALTA) 20 MG capsule TAKE 1 CAPSULE BY MOUTH ONCE DAILY 90 capsule 3     No current facility-administered medications for this visit.          Surgical site  ICD is well-healed in the right subclavicular region    Device interrogation  Intrinsic rhythm appears to be sinus rhythm with complete heart block intrinsic rhythm in the 50s  Does have quite low P waves and shows 41% atrial pacing, 83% ventricular pacing  A few bursts of nonsustained ventricular tachycardia up to 10 beats in length have been noted-at fast rates  Atrial lead showed sensitivity is low, 0.4 mV it seems that we can demonstrate atrial capture with pacing  His RV and LV lead are excellent     Device should last another 8 years    Objective:   Vital signs:  BP (!) 82/56 (Patient Site: Left Arm, Patient Position: Sitting, Cuff Size: Adult Regular)  Pulse 60  Resp 16  Ht 5' 7.5\" (1.715 m)  Wt 151 lb (68.5 kg)  BMI 23.3 kg/m2    Blood pressure 78 sitting, 82 standing  Heart rate is 60 and regular without ectopics  Physical Exam:    GENERAL APPEARANCE: Alert, cooperative and in no acute distress.  HEENT: No scleral icterus. No Xanthelasma. Oral mucous membranes pink and moist.  NECK: JVP normal cm. No Hepatojugular reflux. Thyroid not  Palpable  CHEST: clear to auscultation and percussion  CARDIOVASCULAR: S1, S2 without murmur    Brachial, radial  pulses are intact and " symmetric.   No carotid bruits noted.  ABDOMEN: Non tender. BS+. No bruits.  EXTREMITIES: No cyanosis, clubbing or edema.    Lab Results:  LIPIDS:  Lab Results   Component Value Date    CHOL 201 (H) 09/30/2010     Lab Results   Component Value Date    HDL 58 09/30/2010     Lab Results   Component Value Date    LDLCALC 128.0 09/30/2010     Lab Results   Component Value Date    TRIG 75 09/30/2010     No components found for: CHOLHDL    BMP:  Lab Results   Component Value Date    CREATININE 1.10 08/29/2018    BUN 23 (H) 08/29/2018     (L) 08/29/2018    K 5.1 (H) 08/29/2018    CL 93 (L) 08/29/2018    CO2 27 08/29/2018         This note has been dictated using voice recognition software. Any grammatical or context distortions are unintentional and inherent to the software.  Celio Casas MD  [facility] HEART [facility]  [phone]

## 2021-06-20 NOTE — PROGRESS NOTES
In clinic device check with Device RN followed by office visit with Dr. Casas.  Please see link for full device report.  Patient was informed of results and next follow up during today's visit.

## 2021-06-20 NOTE — PROGRESS NOTES
[facility] clinic Follow Up Note    Everton Larios   53 y.o. male    Date of Visit: 9/20/2018    Chief Complaint   Patient presents with     Follow-up     1 mo follow up     Subjective  Everton, I reviewed the notes in detail and reviewed recent blood work from September 13.     is here for follow-up on his congestive heart failure and other medical issues.    He is also working closely with cardiology    Patient was hospitalized in August for congestive heart failure.  Patient has been having intermittent abdominal bloating and shortness of breath for many months.  This is associated with some increased anxiety spells.    Since medications and diuresis changes, patient has felt much better with reduced shortness of breath, no further anxiety spells.    Earlier this month cardiology attempted increased medication by increasing his carvedilol to 15.625 mg twice daily and increasing the Lasix to 80 mg twice daily.    On September 13 his blood pressure was quite low with fatigue and the carvedilol was put back to 12.5 mg twice daily.  Lisinopril was discontinued.    Still on a lower dose of spironolactone, 25 mg a day.    He is back to the lower dose of furosemide 80 mg in the morning and 40 mg in the afternoon.    Yesterday he had 2 bratwursts, increasing shortness of breath and abdominal swelling last night.  He took a full 80 mg of furosemide instead of the 40 mg, quite a bit of diuresis last night, and feels back to baseline now no shortness of breath abdominal bloating this morning.  No edema.    He has not had any recent paroxysmal atrial fibrillation on pacemaker check on September 13.  Just one 10 beat run of nonsustained V. tach.  Her graph no recent sustained V. tach episodes.  He has an AICD without recent firing.  Still on amiodarone.    A year ago PFTs were stable.  Chest x-ray August 6 looked okay without evidence of fibrosis.    No flare of his asthma.  Still on Flovent albuterol and Singulair.   Quit smoking in .    Cardiac echo last month showed ejection fraction of 16%.  Still mild to moderate mitral regurgitation.  He has had ejection fractions of 19-20% for a number of years.  He has some mild pulmonary hypertension.    He has been referred to the [facility] advanced heart failure clinic    On  his sodium level was lower than usual at 133.  Potassium and creatinine were normal.  He has some intermittent dyspepsia consistent with bile reflux and abdominal bloating.  He has taken Pepto-Bismol as needed.    No flare of his ulcerative colitis.  Bowels are regular no blood in stool.  The Asacol is very expensive, is going to be speaking with GI next week about changing balsalazide.  Last colonoscopy 2017 showed quiescent ulcerative colitis.  Abdominal CT scan last month showed small ascites, no mass or other significant abnormality.    EGD 2017 did show Pierce's with hiatal hernia and gastropathy.  Still on omeprazole.  No melena.    Hypothyroidism with last TSH done by GI in April and was normal.  On Synthroid.    Dysthymia, since his heart failure is been under better control is having less anxiety and does wish to go back to the 20 mg dose of the duloxetine.  Currently on 30 mg.    No history of coronary artery disease.  Negative angiogram in .    He was diagnosed with osteoporosis by gastroenterology, the patient does not wish to consider the osteoporosis drugs.  I did discuss option of Prolia today.  He is on calcium and vitamin D and does walk on a daily basis.    PMHx:    Past Medical History:   Diagnosis Date     Arthritis     hands, neck     ASD (atrial septal defect)     s/p repair age 3     Asthma      Atrial fibrillation (H)      Pierce's esophagus      Cataract      CHF (congestive heart failure) (H)      Clotting disorder (H)      Congenital cardiomyopathy in  (H)      Depression      DJD (degenerative joint disease)     neck      "History of anesthesia complications     nausea     History of transfusion      Hypothyroidism      ICD (implantable cardioverter-defibrillator) in place      Pacemaker      Ulcerative colitis (H)      Ventricular tachycardia (H)      PSHx:    Past Surgical History:   Procedure Laterality Date     ABLATION OF DYSRHYTHMIC FOCUS      for A fib     ASD REPAIR  1968     CARDIAC DEFIBRILLATOR PLACEMENT      x2--last was Feb 2018     COLONOSCOPY N/A 2/5/2015    Procedure: COLONOSCOPY with biopsy;  Surgeon: Fernie Akers MD;  Location: [facility];  Service:      COLONOSCOPY N/A 12/19/2017    Procedure: COLONOSCOPY with biopsies and polypectomies using snare;  Surgeon: Gael Romero MD;  Location: [facility];  Service:      ESOPHAGOGASTRODUODENOSCOPY N/A 12/19/2017    Procedure: ESOPHAGOGASTRODUODENOSCOPY (EGD) with biopsies;  Surgeon: Gael Romero MD;  Location: [facility];  Service:      HAND SURGERY Left      LIPOMA RESECTION      x2     CT REVISE MEDIAN N/CARPAL TUNNEL SURG  9/21/2016    Procedure: OPEN RIGHT CARPAL TUNNEL RELEASE CORTISONE INJECTION RIGHT THUMB;  Surgeon: Duong Santoyo MD;  Location: [facility] OR;  Service: Orthopedics     Immunizations:   Immunization History   Administered Date(s) Administered     Influenza, inj, historic,unspecified 10/22/2008, 09/25/2009, 11/07/2011     Influenza, seasonal,quad inj 36+ mos 09/12/2016, 10/16/2017, 09/20/2018     Influenza, seasonal,quad inj 6-35 mos 11/30/2012, 10/02/2014     Influenza,seasonal quad, PF, 36+MOS 10/02/2015     Tdap 07/20/2012       ROS A comprehensive review of systems was performed and was otherwise negative    Medications, allergies, and problem list were reviewed and updated    Exam  /60  Pulse (!) 45  Ht 5' 7.5\" (1.715 m)  Wt 152 lb (68.9 kg)  SpO2 98%  BMI 23.46 kg/m2  Alert and oriented ×3.  No jaundice.  No JVD.  Lungs are clear to auscultation.  Heart is regular with pacemaker and no murmur rub or " gallop.  No ankle edema.  Abdomen is nontender no hepatomegaly.    Assessment/Plan  1. Systolic CHF, chronic (H)  Well compensated currently.  He had a flare last night with dietary indiscretion with bratwurst.  He was told to maintain his low-salt diet.    He can take an extra dose of furosemide when needed.  He has a phone number to call the CHF clinic at cardiology office for any questions.  Continue current medications.    He will be seen by the advanced heart failure clinic at [facility] soon.  - Influenza, Seasonal,Quad Inj, 36+ MOS  - spironolactone (ALDACTONE) 50 MG tablet; Take 0.5 tablets (25 mg total) by mouth daily.    2. ICD (implantable cardioverter-defibrillator), biventricular, in situ  Has not fired    3. Ulcerative colitis without complications, unspecified location (H)  In remission.  Follow-up with GI soon about balsalazide versus Asacol    4. Mild persistent asthma without complication  No flare, continue current meds.  Flu shot today.    5. Hypothyroidism, unspecified type  Synthroid no recent recurrence on amiodarone  - Thyroid Stimulating Hormone (TSH)    6. Paroxysmal atrial fibrillation (H)  No recent recurrence on amiodarone  - INR    7. Ventricular Tachycardia  Just short nonsustained V. tach.  Amiodarone.  AICD    8. 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 Dysthymia  Return to 20 mg duloxetine, plan that long-term    10. Medication monitoring encounter  Recheck now that he is off lisinopril.  Check sodium with previous low level  - Basic Metabolic Panel    Osteoporosis, he does not wish to consider treatment at this time.  Continue regular walking and calcium and vitamin D.    BPH controlled on Flomax    Return in about 4 months (around 1/20/2019).   Patient Instructions   Continue on current medication.    INR today.    Lab work today to check thyroid test and sodium and kidney labs.  Results will be mailed to you.    Routine follow-up with me in 4  months.    Flu shot today.    You can take an extra dose of furosemide, if needed for abdominal bloating, and/or more than 2 pound weight gain or increasing shortness of breath.  Maintain a low-salt diet.  Stop eating bratwurst.    Fredrick Wolff MD  I spent a total time with patient of over 25 minutes and over 50% coord care.  Time all face to face.      Current Outpatient Prescriptions   Medication Sig Dispense Refill     albuterol (VENTOLIN HFA) 90 mcg/actuation inhaler INHALE 2 PUFFS EVERY 6 HOURS AS NEEDED FOR WHEEZING 1 Inhaler 0     amiodarone (PACERONE) 200 MG tablet TAKE 1 TABLET BY MOUTH EVERY DAY 90 tablet 0     azelastine (ASTELIN) 137 mcg (0.1 %) nasal spray 2 sprays into each nostril 2 (two) times a day. Use in each nostril as directed 30 mL 5     carvedilol (COREG) 12.5 MG tablet TAKE 1 TABLET BY MOUTH TWICE DAILY 180 tablet 3     dicyclomine (BENTYL) 10 MG capsule Take 10 mg by mouth 3 (three) times a day as needed.        DULoxetine (CYMBALTA) 20 MG capsule TAKE 1 CAPSULE BY MOUTH ONCE DAILY 90 capsule 3     fluticasone (FLONASE) 50 mcg/actuation nasal spray Apply 2 sprays into each nostril daily.       fluticasone (FLOVENT HFA) 220 mcg/actuation inhaler INHALE 2 BY MOUTH PUFFS 2 TIMES A DAY. 1 Inhaler 5     furosemide (LASIX) 80 MG tablet Take 80 mg in the morning and 40 mg in the afternoon 180 tablet 3     levothyroxine (SYNTHROID, LEVOTHROID) 50 MCG tablet TAKE 1 TABLET BY MOUTH EVERY DAY 90 tablet 3     mesalamine (ASACOL HD) 800 mg TbEC EC tablet Take 2,400 mg by mouth 2 (two) times a day.       montelukast (SINGULAIR) 10 mg tablet TAKE 1 TABLET BY MOUTH AT BEDTIME 90 tablet 3     omeprazole (PRILOSEC) 40 MG capsule Take 40 mg by mouth every evening.       ondansetron (ZOFRAN-ODT) 4 MG disintegrating tablet Take 4 mg by mouth every 8 (eight) hours as needed.  0     polyethylene glycol (MIRALAX) 17 gram packet Take 17 g by mouth 4 (four) times a day.        potassium chloride (K-DUR,KLOR-CON) 20  MEQ tablet TAKE 1 TABLET(20 MEQ) BY MOUTH DAILY 90 tablet 1     simethicone (GAS-X) 125 mg cap capsule Take 125 mg by mouth 4 (four) times a day as needed for flatulence.       spironolactone (ALDACTONE) 50 MG tablet Take 0.5 tablets (25 mg total) by mouth daily.       tamsulosin (FLOMAX) 0.4 mg Cp24 TAKE 1 CAPSULE BY MOUTH DAILY AT DINNER 90 capsule 2     warfarin (COUMADIN) 5 MG tablet Take 2.5-5 mg by mouth See Admin Instructions. Take 2.5 mg on Sunday and Wednesday; take 5mg the rest of the week       No current facility-administered medications for this visit.      Allergies   Allergen Reactions     Codeine      nausea     Social History   Substance Use Topics     Smoking status: Former Smoker     Packs/day: 1.00     Years: 25.00     Quit date: 11/6/2008     Smokeless tobacco: Former User     Alcohol use 3.6 oz/week     6 Standard drinks or equivalent per week      Comment: 2 drinks 3 times weekly when out with friends

## 2021-06-21 NOTE — LETTER
Letter by Fredrick Wolff MD at      Author: Fredrick Wolff MD Service: -- Author Type: --    Filed:  Encounter Date: 1/5/2021 Status: (Other)         Everton Larios  [address] [facility] [id number]             January 5, 2021         Dear Mr. Larios,    Below are the results from your recent visit:    Resulted Orders   Comprehensive Metabolic Panel   Result Value Ref Range    Sodium 139 136 - 145 mmol/L    Potassium 3.6 3.5 - 5.0 mmol/L    Chloride 106 98 - 107 mmol/L    CO2 22 22 - 31 mmol/L    Anion Gap, Calculation 11 5 - 18 mmol/L    Glucose 97 70 - 125 mg/dL    BUN 29 (H) 8 - 22 mg/dL    Creatinine 1.60 (H) 0.70 - 1.30 mg/dL    GFR MDRD Af Amer 54 (L) >60 mL/min/1.73m2    GFR MDRD Non Af Amer 45 (L) >60 mL/min/1.73m2    Bilirubin, Total 0.4 0.0 - 1.0 mg/dL    Calcium 8.9 8.5 - 10.5 mg/dL    Protein, Total 7.5 6.0 - 8.0 g/dL    Albumin 3.8 3.5 - 5.0 g/dL    Alkaline Phosphatase 146 (H) 45 - 120 U/L    AST 21 0 - 40 U/L    ALT 28 0 - 45 U/L    Narrative    Fasting Glucose reference range is 70-99 mg/dL per  American Diabetes Association (ADA) guidelines.   Thyroid Stimulating Hormone (TSH)   Result Value Ref Range    TSH 0.02 (L) 0.30 - 5.00 uIU/mL   T4, Free   Result Value Ref Range    Free T4 1.0 0.7 - 1.8 ng/dL       Your thyroid test shows possible borderline hyperthyroid (possibly taking too much thyroid hormone ), but your T4 level is normal.  Since you are feeling well, I will have you continue on the current dose of your levothyroxine.    Your creatinine level is mildly higher than previous check.  Discuss that with your transplant team.    Your alkaline phosphatase level is significantly improved and your liver tests are otherwise normal.    Blood sugar was normal.    Please call with questions or contact us using [employer].    Sincerely,        Electronically signed by Fredrick Wolff MD

## 2021-06-21 NOTE — PROGRESS NOTES
[facility] clinic Follow Up Note    Everton Larios   53 y.o. male    Date of Visit: 10/30/2018    Chief Complaint   Patient presents with     Paperwork     Workability Ppwk     Subjective  Everton is here for follow-up on multiple medical issues and to do some paperwork for his work disability.    He has a past medical history of congenital congestive heart failure with right ventricular dysplasia.  History of ventricular tachycardia and paroxysmal atrial fibrillation largely controlled with amiodarone 200 mg a day.  No recent firing of [employer].    He has had progressive heart failure with dyspnea on exertion and shortness of breath and global fatigue.    He has been unable to do the lifting at his current job at [employer], they are changing him to [employer].  He thinks he will be able to stand for a 4-5-hour shift.  He does not have any pain in his hands or shoulders currently.  No carpal tunnel symptoms.    No recent firing of [employer] or palpitations.    Patient was hospitalized in August for congestive heart failure exacerbation with abdominal bloating and dyspnea.  He had a low blood pressure on higher doses of carvedilol and Lasix and he is back to his usual 12.5 mg twice daily Coreg.  Back to Lasix 80 mg in the morning and 40 mg in the evening.    He was on a lower dose of spironolactone at 25 mg a day currently.  Cutting a 50 mg tablet in half.  He is no longer on lisinopril because of lower blood pressure.    August 2018 cardiac echo with ejection fraction 16%.  Mild to moderate mitral regurgitation and mild pulmonary hypertension.    Approximately 1 month ago he took some extra furosemide had some abdominal bloating, that was after a pacemaker change that he did not tolerate well.  He was just there last week for further pacemaker changes which he feels better.    He underwent a cardiopulmonary stress test last week.  He has a right heart catheterization later this week.  He sees cardiology again on November  7.    No current worsening shortness of breath.  No flare of his asthma.  No lower extremity edema.  No lightheaded dizzy spells or syncope.    He just saw cardiology on .  His potassium was low at 2.9.  He was told to double his potassium by taking 20 mg twice a day, which she is doing.  Creatinine was 1.1.  Normal liver tests and blood sugar.  Hemoglobin 13.1.    His TSH was 15.4 with a T4 of 1.1.    In September his TSH was 13.8 and I had him increase the Synthroid to 75 mcg a day, which he states he is compliant with.  He states he does not take the Synthroid with other medications or significant Pepto-Bismol, but has been taking Pepto-Bismol as needed for bile reflux dyspepsia.    No flare of his ulcerative colitis, still on mesalamine, he met with Dr. Velarde will be seeing him again in 3-4 weeks.  Last colonoscopy 2007 was quiesced sent.  2018 CT scan was otherwise negative except for small ascites.    EGD 2017 showed Pierce's esophagus, he is on omeprazole.  No new swallowing difficulty.    Osteoporosis, he declined further Prolia or treatment at this time.  He walks regularly and takes calcium and vitamin D.    No BPH complaints, on Flomax.    He quit smoking .  No asthma flare, on Singulair and Flovent.    Dysthymia stable on Cymbalta 20 mg a day.    No chest pain or chest pressure.  No history of coronary artery disease and a negative angiogram in .    Chest x-ray with no fibrosis 2018.  Normal PFTs last year.    PMHx:    Past Medical History:   Diagnosis Date     Arthritis     hands, neck     ASD (atrial septal defect)     s/p repair age 3     Asthma      Atrial fibrillation (H)      Pierce's esophagus      Cataract      CHF (congestive heart failure) (H)      Clotting disorder (H)      Congenital cardiomyopathy in  (H)      Depression      DJD (degenerative joint disease)     neck     History of anesthesia complications     nausea     History of  transfusion      Hypothyroidism      ICD (implantable cardioverter-defibrillator) in place      Pacemaker      Ulcerative colitis (H)      Ventricular tachycardia (H)      PSHx:    Past Surgical History:   Procedure Laterality Date     ABLATION OF DYSRHYTHMIC FOCUS      for A fib     ASD REPAIR  1968     CARDIAC DEFIBRILLATOR PLACEMENT      x2--last was Feb 2018     COLONOSCOPY N/A 2/5/2015    Procedure: COLONOSCOPY with biopsy;  Surgeon: Fernie Akers MD;  Location: [facility];  Service:      COLONOSCOPY N/A 12/19/2017    Procedure: COLONOSCOPY with biopsies and polypectomies using snare;  Surgeon: Gael Romero MD;  Location: [facility];  Service:      ESOPHAGOGASTRODUODENOSCOPY N/A 12/19/2017    Procedure: ESOPHAGOGASTRODUODENOSCOPY (EGD) with biopsies;  Surgeon: Gael Romero MD;  Location: [facility];  Service:      HAND SURGERY Left      LIPOMA RESECTION      x2     TX REVISE MEDIAN N/CARPAL TUNNEL SURG  9/21/2016    Procedure: OPEN RIGHT CARPAL TUNNEL RELEASE CORTISONE INJECTION RIGHT THUMB;  Surgeon: Duong Santoyo MD;  Location[facility] OR;  Service: Orthopedics     Immunizations:   Immunization History   Administered Date(s) Administered     Influenza, inj, historic,unspecified 10/22/2008, 09/25/2009, 11/07/2011     Influenza, seasonal,quad inj 36+ mos 09/12/2016, 10/16/2017, 09/20/2018     Influenza, seasonal,quad inj 6-35 mos 11/30/2012, 10/02/2014     Influenza,seasonal quad, PF, 36+MOS 10/02/2015     Tdap 07/20/2012       ROS A comprehensive review of systems was performed and was otherwise negative    Medications, allergies, and problem list were reviewed and updated    Exam  BP (!) 82/62 (Patient Site: Right Arm, Patient Position: Sitting, Cuff Size: Adult Regular)  Pulse 62  Temp 98.2  F (36.8  C)  Wt 146 lb 14.4 oz (66.6 kg)  SpO2 100%  BMI 22.67 kg/m2  Appears well and alert and oriented x3.  No jaundice.  Lungs are clear.  Heart is regular with pacemaker.  No  murmur or gallop.  No ankle edema.  Abdomen is nontender.  Able to clamp on the exam table without difficulty.    Assessment/Plan  1. Non-ischemic cardiomyopathy (H)  Significant dyspnea on exertion and fatigue.  Affecting his ability to lift and exert himself at work.  Unable to lift more than 20 pounds.  He is changing his dog prescription to .  He may have some lightheaded dizzy spells difficulty standing for long periods of time but he will attempt that.  Disability paperwork given to patient.  Plan for ongoing disability, no lifting above 20 pounds.    In the process of being evaluated for the transplant list with [facility] cardiology, proceed with further workup as planned.  Patient will be abstaining from alcohol now.  He had second IV iron infusion last week.    2. Atrial fibrillation, unspecified type (H)  Low burden on amiodarone.  Ace maker.  10.  Coumadin currently on hold for upcoming right heart catheterization later this week with cardiology.    3. Hypothyroidism, unspecified type  Still high TSH earlier this month.  Recheck today.  Compliant with Synthroid.  He was told not to take with Pepto-Bismol.  - Thyroid Stimulating Hormone (TSH)    4. Hypokalemia  Increase spironolactone to 50 mg a day.  Continue potassium 20 mg twice a day, until potassium can be rechecked.  He was told to have his potassium checked with his cardiology appointment on November 7, and see me later this month for 1 month follow-up.  - Comprehensive Metabolic Panel    5. Mild persistent asthma without complication  No flare.  Continue inhalers and Singulair    6. Ulcerative colitis without complications, unspecified location (H)  No flare.  Follow-up with GI next month.  Could consider change to balsalazide in the future.    Pierce's esophagus well-controlled on omeprazole.    7. Medication monitoring encounter    - Comprehensive Metabolic Panel    8. Systolic CHF, chronic (H)  Currently compensated  -  spironolactone (ALDACTONE) 50 MG tablet; Take 1 tablet (50 mg total) by mouth daily.  Dispense: 90 tablet; Refill: 2    History of dysthymia, stable and controlled on Cymbalta 20 mg a day.    Osteoporosis, he declines consideration for Prolia or further treatment.  Continue calcium and vitamin D and walking.    On Flomax for BPH.    Return in about 4 weeks (around 11/27/2018) for Recheck.   Patient Instructions   Lab work today.    Increase spironolactone to 50 mg a day.  Continue on potassium supplement twice a day at this time, but recheck your potassium level when you see cardiology on November 7.    See me in clinic in approximately 1 month for recheck, as well.    Proceed with further evaluation with cardiology, as planned.    Fredrick Wolff MD  I spent a total time with patient of over 25 minutes and over 50% coord care.  Time all face to face.      Current Outpatient Prescriptions   Medication Sig Dispense Refill     albuterol (PROAIR HFA;PROVENTIL HFA;VENTOLIN HFA) 90 mcg/actuation inhaler Inhale 2 puffs.       amiodarone (PACERONE) 200 MG tablet Take 200 mg by mouth.       azelastine (ASTELIN) 137 mcg (0.1 %) nasal spray 2 sprays into each nostril 2 (two) times a day. Use in each nostril as directed 30 mL 5     carvedilol (COREG) 12.5 MG tablet Take 12.5 mg by mouth.       dicyclomine (BENTYL) 10 MG capsule Take 10 mg by mouth.       DULoxetine (CYMBALTA) 20 MG capsule Take 1 capsule by mouth.       fluticasone (FLONASE) 50 mcg/actuation nasal spray Apply 2 sprays into each nostril daily.       furosemide (LASIX) 80 MG tablet Take 80 mg in the morning and 40 mg in the afternoon 180 tablet 3     furosemide (LASIX) 80 MG tablet Take one tab (80 mg) in am and 1/2 tab (40 mg) in pm       levothyroxine (SYNTHROID) 75 MCG tablet Take 1 tablet by mouth.       levothyroxine (SYNTHROID, LEVOTHROID) 75 MCG tablet Take 1 tablet (75 mcg total) by mouth daily. 90 tablet 2     mesalamine (ASACOL HD) 800 mg TbEC EC tablet  Take 2,400 mg by mouth 2 (two) times a day.       mesalamine (ASACOL HD) 800 mg TbEC EC tablet Take 2,400 mg by mouth.       montelukast (SINGULAIR) 10 mg tablet TAKE 1 TABLET BY MOUTH AT BEDTIME 90 tablet 3     montelukast (SINGULAIR) 10 mg tablet Take 10 mg by mouth.       omeprazole (PRILOSEC) 40 MG capsule Take 40 mg by mouth every evening.       omeprazole (PRILOSEC) 40 MG capsule Take 40 mg by mouth.       ondansetron (ZOFRAN-ODT) 4 MG disintegrating tablet Take 4 mg by mouth every 8 (eight) hours as needed.  0     ondansetron (ZOFRAN-ODT) 4 MG disintegrating tablet Take 4 mg by mouth.       polyethylene glycol (MIRALAX) 17 gram packet Take 17 g by mouth 4 (four) times a day.        polyethylene glycol (MIRALAX) 17 gram packet Take 17 g by mouth.       potassium chloride (K-DUR,KLOR-CON) 20 MEQ tablet TAKE 1 TABLET(20 MEQ) BY MOUTH DAILY 90 tablet 1     potassium chloride (K-DUR,KLOR-CON) 20 MEQ tablet Take 40 mEq by mouth.       simethicone (GAS-X) 125 mg cap capsule Take 125 mg by mouth 4 (four) times a day as needed for flatulence.       simethicone (GAS-X) 125 mg cap capsule Take 1 capsule by mouth.       spironolactone (ALDACTONE) 50 MG tablet Take 1 tablet (50 mg total) by mouth daily. 90 tablet 2     tamsulosin (FLOMAX) 0.4 mg cap TAKE 1 CAPSULE BY MOUTH DAILY AT DINNER 90 capsule 3     tamsulosin (FLOMAX) 0.4 mg cap Take 1 capsule by mouth.       VENTOLIN HFA 90 mcg/actuation inhaler TAKE 2 PUFFS BY MOUTH EVERY 6 HOURS AS NEEDED FOR WHEEZE 18 Inhaler 0     warfarin (COUMADIN/JANTOVEN) 5 MG tablet Take 1/2-1 tablet (2.5-5 mg) daily as directed. Adjust dose per INR results. 90 tablet 1     warfarin (COUMADIN/JANTOVEN) 5 MG tablet Take 2.5 mg 2 dys/wk and 5 mg all others       No current facility-administered medications for this visit.      Allergies   Allergen Reactions     Lisinopril Other (See Comments)     hypotension     Codeine Nausea Only     nausea     Social History   Substance Use Topics      Smoking status: Former Smoker     Packs/day: 1.00     Years: 25.00     Quit date: 11/6/2008     Smokeless tobacco: Never Used     Alcohol use 3.6 oz/week     6 Standard drinks or equivalent per week      Comment: 2 drinks 3 times weekly when out with friends

## 2021-06-22 ENCOUNTER — MEDICAL CORRESPONDENCE (OUTPATIENT)
Dept: HEALTH INFORMATION MANAGEMENT | Facility: CLINIC | Age: 56
End: 2021-06-22

## 2021-06-22 NOTE — PROGRESS NOTES
[facility] clinic Follow Up Note    Everton Larios   53 y.o. male    Date of Visit: 11/30/2018    Chief Complaint   Patient presents with     1 month recheck     Subjective  Everton is here for hospitalization follow-up after extensive hospitalization at [facility] for congestive heart failure exacerbation with cardiogenic shock and acute renal failure.    Patient has underlying congenital right ventricular dysplasia cardiomyopathy with long-standing congestive heart failure.  He has had a history of V. tach in the distant past but not recently.  He has had paroxysmal atrial fibrillation fairly well-controlled on amiodarone.    He has a biventricular pacemaker.    Cardiac echo in the hospital on November 16 showed ejection fraction 17%.  Diffuse hypokinesis.  Mild mitral regurgitation.  No aortic stenosis.  60% biventricular pacing.    Patient was admitted to the hospital and placed on dobutamine nipride drips, he proceeded to have acute renal failure.  After further medication adjustments he had improved kidney failure.    They discharged him home without carvedilol, and initially on furosemide 20 mg twice a day but that has been decreased to 10 mg twice daily.    He was put on a milrinone drip with pump.  He has a right PICC line.  No problems with the PICC line currently.    He has a left ventricular assist device placed.    He is in the process of undergoing cardiac transplant evaluation.    He saw cardiology 2 days ago, note reviewed by me today.  Blood pressure was 99/61 and heart rate 80.  He is going to start cardiac rehab.  Possible plans for sleep apnea evaluation.    Lab work on November 28 showed a creatinine of 0.68, potassium 3.9, sodium 134.    In November 23 his hemoglobin was 15.6 and a magnesium of 2.2.  INR 1.7.    Since leaving the hospital his weight is remained stable.  No significant lightheaded dizzy spells.  No worsening shortness of breath or orthopnea.   Sleeping adequately at night.    No palpitations or firing of AICD.    He is abstaining from alcohol since discharge from the hospital.    No flare of his asthma.  He is running out of his Flovent and was thinking of stopping that.  I reminded him he needs to continue on a controller inhaler, not just uses albuterol.  He is also on Singulair.  On  an FEV1 of 2.02 was measured in the hospital.    No flare of his ulcerative colitis and bowel still regular and normal.  No abdominal pain complaints.  Still on mesalamine and omeprazole.    Past history of Pierce's esophagus.    Hypothyroidism with recent increase in Synthroid dosing.  He is now on 88 mcg.  Was increased just before admission to the hospital.  On November 15 his TSH was still high at 15.9 but T4 was 1.3.    He is planning to meet with cardiology in a couple weeks for continued evaluation for heart transplant.    He is not using the Zofran, not complaining of significant nausea currently.  I did tell patient about potential heart arrhythmia risk with the use and he will plan not to use.    No new cough.  Quit smoking in .    Urinating adequately on Flomax.    History of dysthymia and anxiety, still on Cymbalta 20 mg a day.  No flare of depression.    No fever or significant pain at the PICC site.    Patient continues to ambulate regularly.  He is still walking to the stores and doing shopping.  Still active on a daily basis.  He has moderate dyspnea on exertion.  Dyspnea on exertion is stable.    Denies falls or syncope.  No bleeding issues.    PMHx:    Past Medical History:   Diagnosis Date     Arthritis     hands, neck     ASD (atrial septal defect)     s/p repair age 3     Asthma      Atrial fibrillation (H)      Pierce's esophagus      Cataract      CHF (congestive heart failure) (H)      Clotting disorder (H)      Congenital cardiomyopathy in  (H)      Depression      DJD (degenerative joint disease)     neck     History of  anesthesia complications     nausea     History of transfusion      Hypothyroidism      ICD (implantable cardioverter-defibrillator) in place      Pacemaker      Ulcerative colitis (H)      Ventricular tachycardia (H)      PSHx:    Past Surgical History:   Procedure Laterality Date     ABLATION OF DYSRHYTHMIC FOCUS      for A fib     ASD REPAIR  1968     CARDIAC DEFIBRILLATOR PLACEMENT      x2--last was Feb 2018     COLONOSCOPY N/A 2/5/2015    Procedure: COLONOSCOPY with biopsy;  Surgeon: Fernie Akers MD;  Location: [facility];  Service:      COLONOSCOPY N/A 12/19/2017    Procedure: COLONOSCOPY with biopsies and polypectomies using snare;  Surgeon: Gael Romero MD;  Location: [facility];  Service:      ESOPHAGOGASTRODUODENOSCOPY N/A 12/19/2017    Procedure: ESOPHAGOGASTRODUODENOSCOPY (EGD) with biopsies;  Surgeon: Gael Romero MD;  Location: [facility];  Service:      HAND SURGERY Left      LIPOMA RESECTION      x2     OH REVISE MEDIAN N/CARPAL TUNNEL SURG  9/21/2016    Procedure: OPEN RIGHT CARPAL TUNNEL RELEASE CORTISONE INJECTION RIGHT THUMB;  Surgeon: Duong Santoyo MD;  Location: [facility] OR;  Service: Orthopedics     Immunizations:   Immunization History   Administered Date(s) Administered     Influenza, inj, historic,unspecified 10/22/2008, 09/25/2009, 11/07/2011     Influenza, seasonal,quad inj 36+ mos 09/12/2016, 10/16/2017, 09/20/2018     Influenza, seasonal,quad inj 6-35 mos 11/30/2012, 10/02/2014     Influenza,seasonal quad, PF, 36+MOS 10/02/2015     Tdap 07/20/2012       ROS A comprehensive review of systems was performed and was otherwise negative    Medications, allergies, and problem list were reviewed and updated    Exam  BP 92/68 (Patient Site: Left Arm, Patient Position: Sitting, Cuff Size: Adult Regular)   Pulse 82   Wt 143 lb (64.9 kg)   BMI 22.07 kg/m    Alert and oriented x3 with good mood and affect.  Pupils and irises equal and reactive and no jaundice.   Lungs are clear to auscultation.  No crackles.  Normal respiratory excursion.  Abdomen is soft and nontender.  There is no ankle edema.  Heart is regular with some moderate ectopy.  I did not hear a gallop or murmur.  PICC line in the right upper arm without erythema or tenderness.  No arm edema.    Assessment/Plan  1. Systolic CHF, chronic (H)  Recent cardiogenic shock with systolic heart failure.  Associated with kidney failure, now resolved.    Patient is now on a milrinone drip and a left ventricular assist device while he awaits evaluation for cardiac transplant.    He will be cared for by cardiology and will follow-up with them in 2 weeks.  He would continue to follow closely with cardiology and transplant team.    Follow-up with me as needed or intermittently.  - furosemide (LASIX) 20 MG tablet; Take 0.5 tablets (10 mg total) by mouth 2 (two) times a day.    Patient understands he needs to abstain completely from alcohol and does plan to do that.    The remain on just 10 mg twice a day furosemide.  Adjusting Lasix if needed for increased edema or shortness of breath or weight gain.    Not on carvedilol or vasodilating agents at this time.    History of low potassium, no longer on spironolactone.  He is on potassium supplement.  Potassium level normal on November 28.  Follow-up with cardiology in 2 weeks.    Currently on full disability from his job at [employer].    He was given a handicap parking form because of his dyspnea on exertion with heart failure.    Addendum home care had been requested after that previous hospitalization.  I do certify that patient was homebound because of reduced mobility with his heart failure condition and needed skilled nursing services for plan of care and [facility]    2. Non-ischemic cardiomyopathy (H)  As above    Tolerating lower blood pressure, at baseline    3. Hypothyroidism, unspecified type  Recent increase in Synthroid.  Last TSH was still high.  I encouraged him to recheck  his thyroid test at his cardiology follow-up when they draw more blood.    4. Mild persistent asthma without complication  No flare.  I encouraged him to stay on his controller medication with Flovent, he was told he could request a referral to the pharmacist to discuss alternative inhalers that may be cheaper for him.  Continue on Singulair and albuterol as needed.    He has had the flu shot this season    5. Ulcerative colitis without complications, unspecified location (H)  No flare.  Mesalamine.    He was told not to use the Zofran because of arrhythmia warnings.  Follow-up with GI.      6. Paroxysmal atrial fibrillation (H)  Amiodarone.  Followed by cardiology.  Warfarin.  INR today.    7. 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  No new swallowing difficulty.    8. Dysthymia  Controlled.  Cymbalta 20 mg a day.    9. History of ventricular tachycardia  No recent recurrence.  He was told not to use the Zofran    10. Medication monitoring encounter  Recent blood work from November 28 reviewed.  Acute renal failure resolved.  Hemoglobin normal.    Osteoporosis, he declined consideration for Prolia or additional treatment.  Continue regular walking and calcium and vitamin D.    Extensive review of medical record from his hospitalization.  Reviewed lab work from hospitalization as well as the heart echo as above and BRIAN in November was normal 1.06/1.11 not had leg claudication.  Abdominal ultrasound no AAA, left renal scarring but no hydronephrosis.  Liver hemangioma otherwise normal-appearing liver.  No pancreatic mass.  Chest x-ray was otherwise clear..  Previous smoker.    Return in about 3 months (around 2/28/2019) for Recheck.   Patient Instructions   No medicine changes today.    INR today.    Follow-up with cardiology as planned in 2 weeks.    Have them check your thyroid tests, T4 and TSH    Follow-up with me as needed, or consider a 3-month follow-up, which can be changed depending on your medical  condition at the time.    Continue your steroid inhaler, Flovent, or ask for an alternative.  Albuterol does not replace Flovent.    Fredrick Wolff MD  I spent a total time with patient of over 40 minutes and over 50% coord care.  Time all face to face.      Current Outpatient Medications   Medication Sig Dispense Refill     albuterol (PROAIR HFA;PROVENTIL HFA;VENTOLIN HFA) 90 mcg/actuation inhaler Inhale 2 puffs.       alpha-D-galactosidase (BEANO ORAL) Take 2 tablets by mouth 3 (three) times a day.       amiodarone (PACERONE) 200 MG tablet TAKE 1 TABLET BY MOUTH DAILY 90 tablet 1     azelastine (ASTELIN) 137 mcg (0.1 %) nasal spray 2 sprays into each nostril 2 (two) times a day. Use in each nostril as directed 30 mL 5     DULoxetine (CYMBALTA) 20 MG capsule Take 1 capsule by mouth daily .             fluticasone (FLONASE) 50 mcg/actuation nasal spray Apply 2 sprays into each nostril daily.       furosemide (LASIX) 20 MG tablet Take 0.5 tablets (10 mg total) by mouth 2 (two) times a day.       levothyroxine (SYNTHROID, LEVOTHROID) 88 MCG tablet Take 1 tablet (88 mcg total) by mouth daily. 90 tablet 1     mesalamine (ASACOL HD) 800 mg TbEC EC tablet Take 2,400 mg by mouth 2 (two) times a day.       milrinone (PRIMACOR) 20 mg/100 mL (200 mcg/mL) infusion Infuse 0.025 mcg/min into a venous catheter continuous.       montelukast (SINGULAIR) 10 mg tablet TAKE 1 TABLET BY MOUTH AT BEDTIME 90 tablet 3     omeprazole (PRILOSEC) 40 MG capsule Take 40 mg by mouth every evening.       potassium chloride (K-DUR,KLOR-CON) 20 MEQ tablet TAKE 1 TABLET(20 MEQ) BY MOUTH DAILY (Patient taking differently: 2 (two) times a day TAKE 1 TABLET(20 MEQ) BY MOUTH DAILY.      ) 90 tablet 1     potassium chloride (K-DUR,KLOR-CON) 20 MEQ tablet Take 40 mEq by mouth.       tamsulosin (FLOMAX) 0.4 mg cap TAKE 1 CAPSULE BY MOUTH DAILY AT DINNER 90 capsule 3     VENTOLIN HFA 90 mcg/actuation inhaler TAKE 2 PUFFS BY MOUTH EVERY 6 HOURS AS NEEDED  FOR WHEEZE 18 Inhaler 0     warfarin (COUMADIN/JANTOVEN) 5 MG tablet Take 1/2-1 tablet (2.5-5 mg) daily as directed. Adjust dose per INR results. 90 tablet 1     No current facility-administered medications for this visit.      Allergies   Allergen Reactions     Lisinopril Other (See Comments)     hypotension     Codeine Nausea Only     nausea     Social History     Tobacco Use     Smoking status: Former Smoker     Packs/day: 1.00     Years: 25.00     Pack years: 25.00     Last attempt to quit: 11/6/2008     Years since quitting: 10.0     Smokeless tobacco: Never Used   Substance Use Topics     Alcohol use: Yes     Alcohol/week: 3.6 oz     Types: 6 Standard drinks or equivalent per week     Comment: 2 drinks 3 times weekly when out with friends     Drug use: No

## 2021-06-23 ENCOUNTER — TRANSCRIBE ORDERS (OUTPATIENT)
Dept: OTHER | Age: 56
End: 2021-06-23

## 2021-06-23 ENCOUNTER — TELEPHONE (OUTPATIENT)
Dept: GASTROENTEROLOGY | Facility: CLINIC | Age: 56
End: 2021-06-23

## 2021-06-23 ENCOUNTER — COMMUNICATION - HEALTHEAST (OUTPATIENT)
Dept: INTERNAL MEDICINE | Facility: CLINIC | Age: 56
End: 2021-06-23

## 2021-06-23 DIAGNOSIS — K51.90 ULCERATIVE COLITIS WITHOUT COMPLICATIONS, UNSPECIFIED LOCATION (H): Primary | ICD-10-CM

## 2021-06-23 DIAGNOSIS — E03.9 HYPOTHYROIDISM, UNSPECIFIED TYPE: ICD-10-CM

## 2021-06-23 DIAGNOSIS — Z51.81 ENCOUNTER FOR THERAPEUTIC DRUG MONITORING: ICD-10-CM

## 2021-06-23 NOTE — TELEPHONE ENCOUNTER
[facility] Call Center    Phone Message    May a detailed message be left on voicemail: yes     Reason for Call: Other: Patient referred for Ulcerative colitis and first available is 08/02. Please review per scheduling guidelines. Thanks!      Action Taken: Message routed to:  [facility] GI    Travel Screening: Not Applicable

## 2021-06-23 NOTE — TELEPHONE ENCOUNTER
Test Results  Who is calling?:  Patient.  Who ordered the test:  [facility]  Type of test: Lab  TSH  Date of test:  Patient states this week  Where was the test performed:  [facility]  What are your questions/concerns?:  Patient states had a TSH done at the [facility] and the result was 10.41. Was told by doctor to have his provider address this result.  Okay to leave a detailed message?:  Yes

## 2021-06-23 NOTE — TELEPHONE ENCOUNTER
Pt has not missed any doses. Advised pt to increase to 100mcg and recheck TSH is 3 weeks. Please send in new rx

## 2021-06-23 NOTE — TELEPHONE ENCOUNTER
ANTICOAGULATION  MANAGEMENT    Assessment     Today's INR result of 2.05 (1/2 at [facility]) is Therapeutic (goal INR of 2.0-3.0)        Warfarin taken as previously instructed    No new diet changes affecting INR    No new medication/supplements affecting INR    Continues to tolerate warfarin with no reported s/s of bleeding or thromboembolism     Previous INR was Subtherapeutic    Plan:     Spoke with Everton regarding INR result and instructed:     Warfarin Dosing Instructions:  Continue current warfarin dose 2.5 mg daily on Mondays and Thursdays; and 2.5 mg daily rest of week  (0 % change)    Instructed patient to follow up no later than: 2-3 weeks.    Education provided: importance of therapeutic range, importance of following up for INR monitoring at instructed interval and importance of taking warfarin as instructed    Everton verbalizes understanding and agrees to warfarin dosing plan.    Instructed to call the [facility] for any changes, questions or concerns. (#[phone])   ?   Kelsi Jordan RN    Subjective/Objective:      Everton Mckeonharmony, a 53 y.o. male is on warfarin.     Everton reports:     Home warfarin dose: verbally confirmed home dose with Everton and updated on anticoagulation calendar     Missed doses: No     Medication changes:  No     S/S of bleeding or thromboembolism:  No     New Injury or illness:  No     Changes in diet or alcohol consumption:  No     Upcoming surgery, procedure or cardioversion:  No    Anticoagulation Episode Summary     Current INR goal:   2.0-2.5   TTR:   46.6 % (4 y)   Next INR check:   12/27/2018   INR from last check:   2.05 (1/2/2019)   Most recent INR:    2.05 (1/2/2019)   Weekly max warfarin dose:      Target end date:      INR check location:      Preferred lab:      Send INR reminders to:   ANTICOAGULATION POOL A (WBY,WBE,MID,RS)    Indications    Paroxysmal Atrial Fibrillation [I48.91]  Atrial Flutter [I48.92]  ASD (atrial septal defect) [Q21.1]  Non-ischemic  cardiomyopathy (H) (Resolved) [I42.8]           Comments:            Anticoagulation Care Providers     Provider Role Specialty Phone number    Fredrick Wolff MD Referring Internal Medicine [phone]

## 2021-06-23 NOTE — TELEPHONE ENCOUNTER
ANTICOAGULATION  MANAGEMENT PROGRAM    Called and spoke to Everton to remind him that he is over due for INR check.    [facility]    Checked Care everyone and INRresults are available from that date.    INR: 2.05

## 2021-06-23 NOTE — TELEPHONE ENCOUNTER
Patient had a TSH checked on January 2 that was elevated at 10.4.    Patient has been getting milrinone infusions for his heart failure and is on amiodarone.    He has been taking 88 mcg of Synthroid.    Please confirm with patient that he has not missed any doses of his Synthroid.  If he has missed doses, but make sure he takes it every day and have him recheck the TSH in 4 weeks.    If he denies missing any doses of the levothyroxine, have him increase the dose to 100 mcg a day, and then recheck his TSH in 4 weeks.    Please let me know about whether he is missed any doses of Synthroid, I will send in a new prescription once I confirm that.

## 2021-06-23 NOTE — TELEPHONE ENCOUNTER
Left message for patient to call [facility] back and update if new doctor is following/managing his anticoagulation therapy.

## 2021-06-23 NOTE — TELEPHONE ENCOUNTER
Who is calling:  [facility]  Reason for Call:  Wondering if they can get the OV notes faxed to them from 11/30/2018 OV with Fredrick Wolff MD for home care.  Patient is currently open to their home care.    Fax [phone]  Date of last appointment with primary care: 11/30/2018  Has the patient been recently seen:  No  Okay to leave a detailed message: No

## 2021-06-23 NOTE — TELEPHONE ENCOUNTER
ANTICOAGULATION  MANAGEMENT    Assessment     Today's INR result of 2.56 is Therapeutic (goal INR of 2.0-3.0)     - [facility].  (see result in care jacekchong).      Warfarin taken differently than instructed, but no impact to total weekly dose    No new diet changes affecting INR    No new medication/supplements affecting INR    Continues to tolerate warfarin with no reported s/s of bleeding or thromboembolism     Previous INR was Therapeutic at 2.05 on 1/2/19 (drawn @ [facility]    Everton reported his PCP is Dr. Wolff.  Will continue to be managed by [facility] program. They just include the INR during his labs, but [facility] does not manage his warfarin doses.    He is being followed at the [facility] for end-stage heart failure.  Will be scheduled for surgery for LVAD. (left ventricular assisted device).  Is a mechanical pump surgically implanted below the heart, attached to the left ventricle to help with blood flow.     Plan:     Spoke with Everton regarding INR result and instructed:    1.  Advised Everton to inform us when he gets his INR's tested at [facility].    Warfarin Dosing Instructions:  (has 5mg tabs).   - Everton verified warfarin dose:   - Continue current warfarin dose 2.5 mg daily on Sun/Thurs; and 5 mg daily rest of week.    Instructed patient to follow up no later than: 2-4 wks.    Education provided: target INR goal and significance of current INR result, importance of notifying clinic for changes in medications and importance of notifying clinic of upcoming surgeries and procedures 2 weeks in advance    Everton verbalizes understanding and agrees to warfarin dosing plan.    Instructed to call the [facility] for any changes, questions or concerns. (#[phone])   ?   Adri Castañeda RN    Subjective/Objective:      Everton Larios, a 53 y.o. male is on warfarin.     Everton reports:     Home warfarin dose: verbally confirmed home dose with Everton and updated on anticoagulation  calendar     Missed doses: No     Medication changes:  No     S/S of bleeding or thromboembolism:  No     New Injury or illness:  Yes:  Diagnosed with end-stage heart failure.     Changes in diet or alcohol consumption:  No     Upcoming surgery, procedure or cardioversion:  Yes:  Will be scheduled for surgery - for mechanical pump implant device (LVAD - left ventricle assisted device).    Anticoagulation Episode Summary     Current INR goal:   2.0-2.5   TTR:   47.4 % (4 y)   Next INR check:   2/14/2019   INR from last check:   2.56! (1/30/2019)   Weekly max warfarin dose:      Target end date:      INR check location:      Preferred lab:      Send INR reminders to:   ANTICOAGULATION POOL A (WBY,WBE,MID,RSC)    Indications    Paroxysmal Atrial Fibrillation [I48.91]  Atrial Flutter [I48.92]  ASD (atrial septal defect) [Q21.1]  Non-ischemic cardiomyopathy (H) (Resolved) [I42.8]           Comments:            Anticoagulation Care Providers     Provider Role Specialty Phone number    Fredrick Wolff MD Referring Internal Medicine [phone]

## 2021-06-24 NOTE — TELEPHONE ENCOUNTER
"Anticoagulation Annual Referral Renewal Review    Everton Larios's chart reviewed for annual renewal of referral to anticoagulation monitoring.        Criteria for anticoagulation nurse and/or pharmacist renewal met   Warfarin indication: Atrial Fibrillation, Atrial Flutter and ASD, non-ischemic cardiomyopathy Yes, per indication   Current with INR monitoring/compliant Yes Yes   Date of last office visit 11/30/18 Yes, had office visit within last year   Time in Therapeutic Range (TTR) 55 % No, TTR < 60 %       Everton Mckeonharmony did NOT meet all criteria for anticoagulation management program initiated renewal and requires provider review. Using dot phrase, \".acmrenewalprovider\", please advise if Everton's anticoagulation management referral should be renewed or if patient should be seen in office to review anticoagulation therapy      Aleyda Fitzgerald RN  1:31 PM      "

## 2021-06-24 NOTE — TELEPHONE ENCOUNTER
Provider Review: Anticoagulation Annual Referral Renewal    ACM Renewal Decision:  Renew ACM warfarin management      INR Range:   Continue management at current INR goal   Anticipated Duration of Therapy (from today):  Long-term anticoagulation      Fredrick Wolff MD  2:34 PM

## 2021-06-25 NOTE — TELEPHONE ENCOUNTER
Left message for pt to call back to schedule appointment. Pt can be seen by new fellows starting end of July.

## 2021-06-25 NOTE — PROGRESS NOTES
Progress Notes by Gunnar Ro MD at 4/25/2017  2:30 PM     Author: Gunnar Ro MD Service: -- Author Type: Physician    Filed: 4/25/2017  3:02 PM Encounter Date: 4/25/2017 Status: Signed    : Gunnar Ro MD (Physician)                  [email or web address]           Thank you Dr. Wolff for asking the [facility] team to participate in the care of your patient, Everton Larios.     Impression and Plan     1. Cardiomyopathy (non-ischemic). Most recent echocardiogram revealed some improvement in LV systolic performance with ejection fraction of 35% 20 January 2016 (previously 25% based on echocardiogram October 2014). Patient appears volume neutral and clinical exam. He is appropriately on lisinopril as well as carvedilol. No dose increase due to relative hypotension at times. Consideration has been given in the past to the addition of an aldosterone inhibitor, though with his tendency toward hyperkalemia this has been deferred.   His weights have been quite stable.  He does adjust his diuretic on his own based on his weight.  Overall he is fairly pleased with how he is performing from a cardiovascular standpoint.  Plan:    Echocardiogram to reassess left ventricular systolic performance.     2. Atrial fibrillation. This is reported as paroxysmal. This has been subjectively and objectively quiescent  on most recent device interrogations (see Cardiac Diagnostics section below). Patient has been maintained on warfarin therapy for CVA prophylaxis. Parenthetically, he does have a history of having suffered a splenic infarct in 2011.     3. History of ventricular arrhythmias. He is maintained on amiodarone vis-à-vis history of atrial fibrillation as well. Most recent device interrogation revealed no significant ventricular rhythm disturbances.     Plan on follow-up in one year.  Patient will also be followed intermittently through device clinic per protocol.            History of Present Illness    Once again I would like to thank you again for asking me to participate in the care of your patient, Everton Larios.  As you know, but to reiterate for my own records, Everton Larios is a 52 y.o. male with history of non-ischemic cardiomyopathy with ejection fraction of 20% by echocardiogram 16 June 2011. Subsequent evaluations of strong gradual improvement in LV function with echocardiogram October 2015 revealing ejection fraction of 25% and most recently 35% by echocardiogram 20 January 2016.     Patient also has a history of paroxysmal atrial fibrillation and also ventricular arrhythmias.      Parenthetically, the patient did undergo atrial septal defect closure/repair at age 3.     On interview today, patient states in general he has been feeling comfortable.   His breathing is comfortable and he denies shortness of breath or chest discomfort. He reports no shortness of breath at night to suggest PND/orthopnea. He reports no palpitations or lightheadedness. Everton also denies any chest pain symptoms.  His only complaint is that of some intermittent sensation of thirst.  Patient does monitor his weight quite closely and on his own adjust his diuretic as needed.    Further review of systems is otherwise negative/noncontributory (medical record and 13 point review of systems reviewed as well and pertinent positives noted).         Cardiac Diagnostics      Echocardiogram 20 January 2016:  1. Normal left ventricular size with moderate depression in left ventricular systolic performance. Ejection fraction 35%.  2. There is global reduction in left ventricular systolic performance with severe septal hypokinesis.  3. There is mild to moderate reduction in right ventricular systolic performance.  4. No significant valvular heart disease.  5. Moderate bi-atrial enlargement.     Echocardiogram October 2014 (formal report not available):  1. Ejection fraction of 25% per narrative provided by  Dr. Fredrick Wolff.      Echocardiogram (personally reviewed) 16 June 2011:  1. Normal left ventricular size with severe reduction in left ventricular systolic performance. Ejection fraction 20%.  2. Severe global reduction in left ventricular systolic performance.  3. No significant valvular heart disease.  4. Normal right ventricular size with borderline reduction in right ventricular systolic performance.  5. Severe left atrial enlargement. Moderate right atrial enlargement.  6. There is a very small posterior pericardial effusion without evidence of Not physiology.  7. IVC appears dilated with decreased phasic variation and cable diameter consistent with increased right atrial pressure.     Regadenoson nuclear perfusion study 16 June 2011:  1. Mild to moderate ischemia of medium sized area in the inferolateral wall of the left ventricle suggested. No evidence of scar.  2. Mild global reduction in left ventricular systolic performance with ejection fraction of 40-45% with mild septal hypokinesis.     Coronary angiography 17 June 2011:  1. Normal angiographic appearing coronary arteries with right coronary arterial dominant system.  2. Moderate to severe global left ventricular hypokinesis with calculated ejection fraction of 42%.     Device interrogation 14 February 2017 ([employer] device implanted 6 November 2008):  1. Atrial sensing with biventricular pacing.  2. No atrial or ventricular arrhythmias detected  3. Normal device function.    Device interrogation 21 October 2015 ([employer] device implanted 6 November 2008):  1. Normal battery/lead status.  2. No atrial arrhythmias detected.  3. On five beat episode of NSVT.  4. Normal device function.  5. 82% biventricular pacing.    Device interrogation 21 October 2015 ([employer] device implanted 6 November 2008):  1. Normal battery/lead status.  2. No atrial arrhythmias detected.  3. No ventricular arrhythmias detected.  4. Normal device  "function.     Device interrogation 21 October 2015 ([employer] device implanted 6 November 2008):  1. AV sequential and atrial synchronous biventricular pacing.  2. Stable lead and battery status.  3. No atrial arrhythmias.  4. One brief episode of non-sustained ventricular tachycardia.     12-lead ECG (personally reviewed) 16 June 2011: Sinus rhythm with ventricular demand pacing (atrial sensed and ventricular pace) occasional PVCs.         Physical Examination       /60 (Patient Site: Left Arm, Patient Position: Sitting, Cuff Size: Adult Regular)  Pulse 72  Resp 16  Ht 5' 8\" (1.727 m)  Wt 184 lb 3.2 oz (83.6 kg)  BMI 28.01 kg/m2        Wt Readings from Last 3 Encounters:   04/25/17 184 lb 3.2 oz (83.6 kg)   04/20/17 182 lb (82.6 kg)   02/15/17 182 lb (82.6 kg)       The patient is alert and oriented times three. Sclerae are anicteric. Mucosal membranes are moist. Jugular venous pressure is normal. No significant adenopathy/thyromegally appreciated. Lungs are clear with good expansion. On cardiovascular exam, the patient has a regular S1 and S2. Abdomen is soft and non-tender. Extremities reveal no clubbing, cyanosis, or edema.         Family History/Social History/Risk Factors   Patient does not smoke.  Family history of hypertension.         Medications  Allergies   Current Outpatient Prescriptions   Medication Sig Dispense Refill   ? albuterol (PROVENTIL HFA;VENTOLIN HFA) 90 mcg/actuation inhaler Inhale 2 puffs every 6 (six) hours as needed for wheezing. 1 Inhaler 3   ? amiodarone (PACERONE) 200 MG tablet TAKE 1 TABLET BY MOUTH EVERY DAY 90 tablet 1   ? balsalazide (COLAZAL) 750 mg capsule Take 2 capsules by mouth 3 (three) times a day.  3   ? carvedilol (COREG) 12.5 MG tablet TAKE 1 TABLET BY MOUTH TWICE DAILY 180 tablet 3   ? DULoxetine (CYMBALTA) 20 MG capsule TAKE 1 CAPSULE BY MOUTH ONCE DAILY 90 capsule 3   ? fluticasone (FLONASE) 50 mcg/actuation nasal spray USE 2 SPRAYS IN EACH NOSTRIL " ONCE DAILY. 48 g 3   ? fluticasone (FLOVENT HFA) 110 mcg/actuation inhaler Inhale 2 puffs 2 (two) times a day. 1 Inhaler 2   ? furosemide (LASIX) 80 MG tablet Take 1 tablet in the morning and one half in the afternoon (Patient taking differently: Take 80 mg by mouth 2 (two) times a day at 9am and 6pm. Take 1 tablet in the morning and one half in the afternoon) 180 tablet 1   ? levothyroxine (SYNTHROID, LEVOTHROID) 50 MCG tablet TAKE 1 TABLET BY MOUTH EVERY DAY 90 tablet 3   ? lisinopril (PRINIVIL,ZESTRIL) 5 MG tablet Take 5 mg by mouth daily.      ? montelukast (SINGULAIR) 10 mg tablet Take 1 tablet (10 mg total) by mouth at bedtime. 30 tablet 1   ? omeprazole (PRILOSEC) 20 MG capsule TAKE 1 CAPSULE BY MOUTH ONCE DAILY 90 capsule 3   ? senna-docusate (SENNOSIDES-DOCUSATE SODIUM) 8.6-50 mg tablet Take 1 tablet by mouth 2 (two) times a day as needed for constipation.  0   ? tamsulosin (FLOMAX) 0.4 mg Cp24 TAKE 1 CAPSULE BY MOUTH DAILY AT SUPPER 30 capsule 11   ? warfarin (COUMADIN) 5 MG tablet TAKE 1 TABLET BY MOUTH DAILY OR AS DIRECTED 90 tablet 0   ? furosemide (LASIX) 80 MG tablet TAKE 1 TABLET BY MOUTH TWICE DAILY 180 tablet 1   ? omeprazole (PRILOSEC) 20 MG capsule TAKE 1 CAPSULE BY MOUTH ONCE DAILY 90 capsule 0     No current facility-administered medications for this visit.       Allergies   Allergen Reactions   ? Codeine      nausea          Lab Results   Lab Results   Component Value Date     01/11/2017    K 3.7 01/11/2017     01/11/2017    CO2 31 01/11/2017    BUN 19 01/11/2017    CREATININE 1.33 (H) 01/11/2017    CALCIUM 8.6 01/11/2017     Lab Results   Component Value Date    WBC 6.1 10/24/2016    WBC 6.0 06/02/2015    HGB 13.0 (L) 10/24/2016    HCT 39.1 (L) 10/24/2016    MCV 81 10/24/2016     10/24/2016     Lab Results   Component Value Date    CHOL 201 (H) 09/30/2010    TRIG 75 09/30/2010    HDL 58 09/30/2010    LDLCALC 128.0 09/30/2010     Lab Results   Component Value Date    INR  2.80 (H) 04/25/2017    INR 2.20 (!) 01/12/2016     Lab Results   Component Value Date     (H) 11/02/2016     Lab Results   Component Value Date    TROPONINI <0.01 06/17/2011    TROPONINI 0.01 06/16/2011    TROPONINI <0.01 06/16/2011     Lab Results   Component Value Date    TSH 2.71 09/12/2016

## 2021-06-26 NOTE — PROGRESS NOTES
Progress Notes by Gunnar Ro MD at 10/2/2018 12:50 PM     Author: Gunnar Ro MD Service: -- Author Type: Physician    Filed: 10/2/2018  1:04 PM Encounter Date: 10/2/2018 Status: Signed    : Gunnar Ro MD (Physician)                  [email or web address]  [phone]         Thank you Dr. Wolff for asking the [facility] team to participate in the care of your patient, Everton Larios.     Impression and Plan     1. Cardiomyopathy (non-ischemic). Most recent echocardiogram 7 August 2018 revealed severe depression left ventricular systolic performance with ejection fraction of 15-20%.  Patient appears volume neutral and clinical exam, however, his weight is up approximately 5-6 pounds since his last visit with us.  When he was last seen earlier this month by my colleague, Dr. Casas, his lisinopril was discontinued due to systolic blood pressure in the 80s.  Patient is on carvedilol as well as aldosterone inhibitor.  Ultimately, I recommended to show him that should his weight further increased to take additional tablet of furosemide until his weight returns back to baseline.  Of note, he does have [facility].     2. Atrial fibrillation. This is reported as paroxysmal. This has been subjectively and objectively quiescent  on most recent device interrogations (see Cardiac Diagnostics section below). Patient has been maintained on warfarin therapy for CVA prophylaxis. Parenthetically, he does have a history of having suffered a splenic infarct in 2011.     3. History of ventricular arrhythmias. He is maintained on amiodarone vis-à-vis history of atrial fibrillation as well. Most recent device interrogation revealed no significant ventricular rhythm disturbances.     Plan on follow-up in 4 months Patient will also be followed intermittently through Device Clinic per protocol.          History of Present Illness    Once again I would like to thank you again for asking me to participate in the care of your patient, Everton Larios.  As you know, but to reiterate for my own records, Everton Larios is a 53 y.o. male with history of non-ischemic cardiomyopathy.  Most recent echocardiogram 25 January 2015 revealed ejection fraction of 15-20%.     Patient also has a history of paroxysmal atrial fibrillation and also ventricular arrhythmias.      Patient underwent successful ICD generator replacement 9 February 2018.     Parenthetically, the patient did undergo atrial septal defect closure/repair at age 3.     On interview today, patient states in general he has been feeling comfortable.   His weight is up approximately 5-6 pounds though he attributes this to some dietary indiscretion and has been more diligent in watching his sodium over the past several days.  His breathing is comfortable.  He denies chest pain.  He reports no subjective palpitations or lightheadedness.    Further review of systems is otherwise negative/noncontributory (medical record and 13 point review of systems reviewed as well and pertinent positives noted).         Cardiac Diagnostics      Echocardiogram 7 August 2018:  1. Mild left ventricular enlargement with severe depression left ventricular systolic performance.  Ejection fraction 15-20%.  2. Mild to moderate mitral insufficiency.  3. Normal right ventricular size with mildly reduced function.  4. Severe left atrial enlargement.  Right atrium of normal dimension.      Echocardiogram 25 January 2018:  1. Mild left ventricular enlargement with severe depression of left ventricular systolic performance.  Ejection fraction 15-20%.  2. Mild to moderate mitral insufficiency.  3. Normal right ventricular size.  Reduced right ventricular function, not quantified.  4. Severe left atrial enlargement.  Mild right atrial enlargement.     Echocardiogram 20 January 2016:  1. Normal  left ventricular size with moderate depression in left ventricular systolic performance. Ejection fraction 35%.  2. There is global reduction in left ventricular systolic performance with severe septal hypokinesis.  3. There is mild to moderate reduction in right ventricular systolic performance.  4. No significant valvular heart disease.  5. Moderate bi-atrial enlargement.     Echocardiogram October 2014 (formal report not available):  1. Ejection fraction of 25% per narrative provided by Dr. Fredrick Wolff.      Echocardiogram (personally reviewed) 16 June 2011:  1. Normal left ventricular size with severe reduction in left ventricular systolic performance. Ejection fraction 20%.  2. Severe global reduction in left ventricular systolic performance.  3. No significant valvular heart disease.  4. Normal right ventricular size with borderline reduction in right ventricular systolic performance.  5. Severe left atrial enlargement. Moderate right atrial enlargement.  6. There is a very small posterior pericardial effusion without evidence of Not physiology.  7. IVC appears dilated with decreased phasic variation and cable diameter consistent with increased right atrial pressure.     Regadenoson nuclear perfusion study 16 June 2011:  1. Mild to moderate ischemia of medium sized area in the inferolateral wall of the left ventricle suggested. No evidence of scar.  2. Mild global reduction in left ventricular systolic performance with ejection fraction of 40-45% with mild septal hypokinesis.    Coronary angiography 17 June 2011:  1. Normal angiographic appearing coronary arteries with right coronary arterial dominant system.  2. Moderate to severe global left ventricular hypokinesis with calculated ejection fraction of 42%.    Device interrogation 13 September 2018 ([employer] G141 INOGEN CRT-D device implanted 9 February 2018):  1. Presenting: AP-BiVP, 60bpm  2. Lead/Battery Status: Stable lead and battery  "measurements.  3. Atrial Arrhythmias: None  4. Vent Arrhythmias: 1 NSVT episode detected on 23 April 2018 for 10 beats of NSVT.  5. Comments: Normal device function. 83% RVP. 81% LVP. LV threshold changed to 2.2V from 3V.     Device interrogation 16 February 2018 ([employer] G141 INOGEN CRT-D device implanted 9 February 2018):  1. Presenting: AS BiVP, frequent PVC, 65bpm  2. Lead/Battery Status: Stable  3. Atrial Arrhythmias: None detected  4. Vent Arrhythmias: None detected.  5. Anticoagulant: Warfarin.  6. Comments: Normal device function. BiV pacing 84%, likely related to PVCs.    12-lead ECG (personally reviewed) 16 June 2011: Sinus rhythm with ventricular demand pacing (atrial sensed and ventricular pace) occasional PVCs.            Physical Examination       BP 90/70 (Patient Site: Right Arm, Patient Position: Sitting, Cuff Size: Adult Regular)  Pulse 68  Resp 16  Ht 5' 7.5\" (1.715 m)  Wt 158 lb (71.7 kg)  BMI 24.38 kg/m2        Wt Readings from Last 3 Encounters:   10/02/18 158 lb (71.7 kg)   09/20/18 152 lb (68.9 kg)   09/13/18 151 lb (68.5 kg)       The patient is alert and oriented times three. Sclerae are anicteric. Mucosal membranes are moist. Jugular venous pressure is normal. No significant adenopathy/thyromegally appreciated. Lungs are clear with good expansion. On cardiovascular exam, the patient has a regular S1 and S2. Abdomen is soft and non-tender. Extremities reveal no clubbing, cyanosis, or edema.         Family History/Social History/Risk Factors   Patient does not smoke.  Family history of hypertension.         Medications  Allergies   Current Outpatient Prescriptions   Medication Sig Dispense Refill   ? amiodarone (PACERONE) 200 MG tablet TAKE 1 TABLET BY MOUTH EVERY DAY 90 tablet 0   ? azelastine (ASTELIN) 137 mcg (0.1 %) nasal spray 2 sprays into each nostril 2 (two) times a day. Use in each nostril as directed 30 mL 5   ? carvedilol (COREG) 12.5 MG tablet TAKE 1 TABLET BY " MOUTH TWICE DAILY 180 tablet 3   ? DULoxetine (CYMBALTA) 20 MG capsule TAKE 1 CAPSULE BY MOUTH ONCE DAILY 90 capsule 3   ? fluticasone (FLONASE) 50 mcg/actuation nasal spray Apply 2 sprays into each nostril daily.     ? fluticasone (FLOVENT HFA) 220 mcg/actuation inhaler INHALE 2 BY MOUTH PUFFS 2 TIMES A DAY. 1 Inhaler 5   ? furosemide (LASIX) 80 MG tablet Take 80 mg in the morning and 40 mg in the afternoon 180 tablet 3   ? levothyroxine (SYNTHROID, LEVOTHROID) 75 MCG tablet Take 1 tablet (75 mcg total) by mouth daily. 90 tablet 2   ? mesalamine (ASACOL HD) 800 mg TbEC EC tablet Take 2,400 mg by mouth 2 (two) times a day.     ? montelukast (SINGULAIR) 10 mg tablet TAKE 1 TABLET BY MOUTH AT BEDTIME 90 tablet 3   ? omeprazole (PRILOSEC) 40 MG capsule Take 40 mg by mouth every evening.     ? ondansetron (ZOFRAN-ODT) 4 MG disintegrating tablet Take 4 mg by mouth every 8 (eight) hours as needed.  0   ? polyethylene glycol (MIRALAX) 17 gram packet Take 17 g by mouth 4 (four) times a day.      ? potassium chloride (K-DUR,KLOR-CON) 20 MEQ tablet TAKE 1 TABLET(20 MEQ) BY MOUTH DAILY 90 tablet 1   ? simethicone (GAS-X) 125 mg cap capsule Take 125 mg by mouth 4 (four) times a day as needed for flatulence.     ? spironolactone (ALDACTONE) 50 MG tablet Take 0.5 tablets (25 mg total) by mouth daily.     ? tamsulosin (FLOMAX) 0.4 mg Cp24 TAKE 1 CAPSULE BY MOUTH DAILY AT DINNER 90 capsule 2   ? VENTOLIN HFA 90 mcg/actuation inhaler TAKE 2 PUFFS BY MOUTH EVERY 6 HOURS AS NEEDED FOR WHEEZE 18 Inhaler 0   ? warfarin (COUMADIN) 5 MG tablet Take 2.5-5 mg by mouth See Admin Instructions. Take 2.5 mg on Sunday and Wednesday; take 5mg the rest of the week     ? dicyclomine (BENTYL) 10 MG capsule Take 10 mg by mouth 3 (three) times a day as needed.        No current facility-administered medications for this visit.       Allergies   Allergen Reactions   ? Codeine      nausea          Lab Results   Lab Results   Component Value Date    NA  136 09/20/2018    K 4.1 09/20/2018    CL 96 (L) 09/20/2018    CO2 27 09/20/2018    BUN 20 09/20/2018    CREATININE 1.13 09/20/2018    CALCIUM 9.5 09/20/2018     Lab Results   Component Value Date    WBC 7.3 08/06/2018    WBC 6.0 06/02/2015    HGB 13.9 (L) 08/06/2018    HCT 44.4 08/06/2018    MCV 90 08/06/2018     08/06/2018     Lab Results   Component Value Date    CHOL 201 (H) 09/30/2010    TRIG 75 09/30/2010    HDL 58 09/30/2010    LDLCALC 128.0 09/30/2010     Lab Results   Component Value Date    INR 1.90 (H) 09/20/2018    INR 2.20 (!) 01/12/2016     Lab Results   Component Value Date    BNP 2374 (H) 08/06/2018     Lab Results   Component Value Date    TROPONINI 0.05 08/07/2018    TROPONINI 0.08 08/07/2018    TROPONINI 0.05 08/06/2018     Lab Results   Component Value Date    TSH 13.81 (H) 09/20/2018

## 2021-06-26 NOTE — PROGRESS NOTES
Progress Notes by Bhumika Vera CNP at 8/15/2018  1:30 PM     Author: Bhumika Vera CNP Service: -- Author Type: Nurse Practitioner    Filed: 8/15/2018  2:21 PM Encounter Date: 8/15/2018 Status: Signed    : Bhumika Vera CNP (Nurse Practitioner)           Click to link to [facility] Heart Care     [facility] HEART CARE NOTE      Assessment/Recommendations   Assessment:    1. Nonischemic cardiomyopathy with systolic dysfunction, NYHA class III: Well compensated.  He continues to have fatigue and dyspnea on exertion.  He states his symptoms of abdominal bloating and orthopnea have improved since hospitalization.  We discussed monitoring heart failure symptoms, following a low-sodium diet, monitoring daily weights, and heart failure treatment.  His weights have decreased slightly since discharge.  He is unable to exercise due to dyspnea on exertion.  He met with the nurse clinician for further heart failure education.    2.  Hypotension: Blood pressure 78/62 with a recheck of 80/60.  He states he has lost another few pounds since discharge.  I believe he is on too much diuretics.  He states he believes he is not drinking enough fluids either.  I provided him with some water today.  He denies dizziness or lightheadedness.    3.  Paroxysmal atrial fibrillation: He is on warfarin for anticoagulation.  He is also on amiodarone 200 mg daily.    Plan:  1.   Heart failure medications:  - Beta blocker therapy with carvedilol 12.5 mg twice a day  - ACEI therapy with lisinopril 2.5 mg daily  - Aldosterone blocker therapy with spironolactone reduced to 25 mg daily.   - Diuretic therapy with furosemide 80 mg twice a day  2.  BMP pending  3.  Continue daily weights and low-sodium diet  4.  I will discuss with Dr. Ro for [facility].    Everton Larios will follow up with Dr. Casas September 13, Dr. Ro in 8 weeks, and in the heart failure clinic  in 2 weeks.     History of Present Illness    [facility] post[facility]hospitalization follow-up.  He was hospitalized at [facility] from August 6 - August 11, 2018 with shortness of breath.  His BNP was 2374 on admission.  He received IV diuretics and lost 12 pounds of fluid during hospitalization.  His symptoms improved.  The most recent evaluation of His ejection fraction was 16% from an  echo on 8/7/2018.  His past medical history is also significant for atrial fibrillation.  He has a history of ASD repair at age 3.  He had a CRT-D placed November 2018 with a generator change February 9, 2018.    Since being discharged from the hospital, Everton feels that he is improving. He continues to have dyspnea on exertion and fatigue.  Prior to hospitalization he was having abdominal bloating and orthopnea which she currently does not have.  He denies dizziness or lightheadedness.  He denies lower extremity edema.  Denies lightheadedness, shortness of breath, orthopnea, PND, palpitations, chest pain, abdominal fullness/bloating and lower extremity edema.     Everton Disla weight at discharge was 156 pounds.  He is monitoring home weights which are stable between 147-150 pounds.  He is following a low sodium diet.        ECHO 8/7/2018:   Summary     When compared to the previous study dated 1/25/2018, no significant change.    Left ventricle ejection fraction is severely decreased. The calculated left ventricular ejection fraction is 16%.    Cavity is mildly increased. The left ventricular wall thickness is normal. E/e' > 15, suggesting elevated LV filling pressures.    The systolic function is mildly reduced. TAPSE is abnormal, which is consistent with abnormal right ventricular systolic function.    Mild to moderate mitral regurgitation.          Physical Examination Review of Systems   Vitals:    08/15/18 1405   BP: (!) 80/60   Pulse:    Resp:      Body mass index  is 23.15 kg/(m^2).  Wt Readings from Last 3 Encounters:   08/15/18 150 lb (68 kg)   18 156 lb (70.8 kg)   18 168 lb (76.2 kg)       General Appearance:     Alert, cooperative and in no acute distress.   ENT/Mouth: membranes moist, no oral lesions or bleeding gums.      EYES:  no scleral icterus, normal conjunctivae   Neck: no carotid bruits or thyromegaly   Chest/Lungs:   lungs are clear to auscultation, no rales or wheezing, respirations unlabored   Cardiovascular:   Regular. Normal first and second heart sounds with no murmurs, rubs, or gallops; the carotid, radial and posterior tibial pulses are intact, Jugular venous pressure normal, no edema     Abdomen:  Soft, nontender, nondistended, bowel sounds present   Extremities: no cyanosis or clubbing   Skin: warm, dry.    Neurologic: mood and affect are appropriate, alert and oriented x3      General: Night Sweats, Weight Gain, Weight Loss  Eyes: WNL  Ears/Nose/Throat: WNL  Lungs: Cough, Shortness of Breath, Wheezing  Heart: Chest Pain, Shortness of Breath with activity, Irregular Heartbeat  Stomach: Constipation  Bladder: Frequent Urination at Night  Muscle/Joints: Muscle Weakness  Skin: WNL  Nervous System: Daytime Sleepiness  Mental Health: Depression, Anxiety     Blood: Easy Bleeding, Easy Bruising     Medical History  Surgical History Family History Social History   Past Medical History:   Diagnosis Date   ? Arthritis     hands, neck   ? ASD (atrial septal defect)     s/p repair age 3   ? Asthma    ? Atrial fibrillation (H)    ? Pierce's esophagus    ? Cataract    ? CHF (congestive heart failure) (H)    ? Clotting disorder (H)    ? Congenital cardiomyopathy in  (H)    ? Depression    ? DJD (degenerative joint disease)     neck   ? History of anesthesia complications     nausea   ? History of transfusion    ? Hypothyroidism    ? ICD (implantable cardioverter-defibrillator) in place    ? Pacemaker    ? Ulcerative colitis (H)    ? Ventricular  tachycardia (H)     Past Surgical History:   Procedure Laterality Date   ? ABLATION OF DYSRHYTHMIC FOCUS      for A fib   ? ASD REPAIR  1968   ? CARDIAC DEFIBRILLATOR PLACEMENT      x2--last was Feb 2018   ? COLONOSCOPY N/A 2/5/2015    Procedure: COLONOSCOPY with biopsy;  Surgeon: Fernie Akers MD;  Location: [facility];  Service:    ? COLONOSCOPY N/A 12/19/2017    Procedure: COLONOSCOPY with biopsies and polypectomies using snare;  Surgeon: Gael Romero MD;  Location: [facility];  Service:    ? ESOPHAGOGASTRODUODENOSCOPY N/A 12/19/2017    Procedure: ESOPHAGOGASTRODUODENOSCOPY (EGD) with biopsies;  Surgeon: Gael Romero MD;  Location: [facility];  Service:    ? HAND SURGERY Left    ? LIPOMA RESECTION      x2   ? MS REVISE MEDIAN N/CARPAL TUNNEL SURG  9/21/2016    Procedure: OPEN RIGHT CARPAL TUNNEL RELEASE CORTISONE INJECTION RIGHT THUMB;  Surgeon: Duong Santoyo MD;  Location: [facility];  Service: Orthopedics    Family History   Problem Relation Age of Onset   ? Hypertension Father    ? Endometrial cancer Mother    ? Endometrial cancer Maternal Grandmother     Social History     Social History   ? Marital status: Single     Spouse name: N/A   ? Number of children: N/A   ? Years of education: N/A     Occupational History   ? Not on file.     Social History Main Topics   ? Smoking status: Former Smoker     Packs/day: 1.00     Years: 25.00     Quit date: 11/6/2008   ? Smokeless tobacco: Former User   ? Alcohol use 3.6 oz/week     6 Standard drinks or equivalent per week      Comment: 2 drinks 3 times weekly when out with friends   ? Drug use: No   ? Sexual activity: Not on file     Other Topics Concern   ? Not on file     Social History Narrative    He works part-time at [employer].          Medications  Allergies   Current Outpatient Prescriptions   Medication Sig Dispense Refill   ? albuterol (VENTOLIN HFA) 90 mcg/actuation inhaler INHALE 2 PUFFS EVERY 6 HOURS AS NEEDED FOR WHEEZING 1  Inhaler 0   ? amiodarone (PACERONE) 200 MG tablet TAKE 1 TABLET BY MOUTH EVERY DAY 90 tablet 0   ? azelastine (ASTELIN) 137 mcg (0.1 %) nasal spray 2 sprays into each nostril 2 (two) times a day. Use in each nostril as directed 30 mL 5   ? carvedilol (COREG) 12.5 MG tablet TAKE 1 TABLET BY MOUTH TWICE DAILY 180 tablet 3   ? DULoxetine (CYMBALTA) 30 MG capsule Take 1 capsule (30 mg total) by mouth daily. 90 capsule 0   ? fluticasone (FLONASE) 50 mcg/actuation nasal spray Apply 2 sprays into each nostril daily.     ? fluticasone (FLOVENT HFA) 220 mcg/actuation inhaler INHALE 2 BY MOUTH PUFFS 2 TIMES A DAY. 1 Inhaler 5   ? furosemide (LASIX) 80 MG tablet Take 1 tablet (80 mg total) by mouth 2 (two) times a day at 9am and 6pm. 180 tablet 3   ? levothyroxine (SYNTHROID, LEVOTHROID) 50 MCG tablet TAKE 1 TABLET BY MOUTH EVERY DAY 90 tablet 3   ? lisinopril (PRINIVIL,ZESTRIL) 5 MG tablet Take 0.5 tablets (2.5 mg total) by mouth daily. (Patient taking differently: Take 2.5 mg by mouth every evening. ) 45 tablet 1   ? mesalamine (ASACOL HD) 800 mg TbEC EC tablet Take 2,400 mg by mouth 2 (two) times a day.     ? montelukast (SINGULAIR) 10 mg tablet TAKE 1 TABLET BY MOUTH AT BEDTIME 90 tablet 3   ? omeprazole (PRILOSEC) 40 MG capsule Take 40 mg by mouth every evening.     ? ondansetron (ZOFRAN-ODT) 4 MG disintegrating tablet Take 4 mg by mouth every 8 (eight) hours as needed.  0   ? polyethylene glycol (MIRALAX) 17 gram packet Take 17 g by mouth 4 (four) times a day.      ? potassium chloride (K-DUR,KLOR-CON) 20 MEQ tablet TAKE 1 TABLET(20 MEQ) BY MOUTH DAILY 90 tablet 1   ? simethicone (GAS-X) 125 mg cap capsule Take 125 mg by mouth 4 (four) times a day as needed for flatulence.     ? tamsulosin (FLOMAX) 0.4 mg Cp24 TAKE 1 CAPSULE BY MOUTH DAILY AT DINNER 90 capsule 2   ? warfarin (COUMADIN) 5 MG tablet Take 2.5-5 mg by mouth See Admin Instructions. Take 2.5 mg on Sunday and Wednesday; take 5mg the rest of the week     ?  dicyclomine (BENTYL) 10 MG capsule Take 10 mg by mouth 3 (three) times a day as needed.      ? spironolactone (ALDACTONE) 50 MG tablet Take 0.5 tablets (25 mg total) by mouth daily. 45 tablet 3     No current facility-administered medications for this visit.       Allergies   Allergen Reactions   ? Codeine      nausea         Lab Results    Chemistry CBC BNP   Lab Results   Component Value Date    CREATININE 1.03 08/10/2018    BUN 19 08/10/2018     08/10/2018    K 3.4 (L) 08/10/2018    K 3.4 (L) 08/10/2018     08/10/2018    CO2 26 08/10/2018     Creatinine (mg/dL)   Date Value   08/10/2018 1.03   08/09/2018 1.13   08/06/2018 1.20   06/30/2018 1.10    Lab Results   Component Value Date    WBC 7.3 08/06/2018    HGB 13.9 (L) 08/06/2018    HCT 44.4 08/06/2018    MCV 90 08/06/2018     08/06/2018    Lab Results   Component Value Date    BNP 2374 (H) 08/06/2018     BNP (pg/mL)   Date Value   08/06/2018 2374 (H)   04/02/2018 999 (H)   11/02/2016 174 (H)          40 minutes were spent with the patient with greater than 50% spent on education and counseling.      Bhumika Vera, [facility]   Heart Failure Clinic

## 2021-06-26 NOTE — TELEPHONE ENCOUNTER
The patient was informed of the clinician message and verbalized understanding. He stated that he will schedule a lab appt on his own, possibly at a [facility] or [facility] location in [facility].

## 2021-06-26 NOTE — TELEPHONE ENCOUNTER
Contact patient.  His TSH was still low and I will reduce his levothyroxine dose down to 88 mcg a day.  Have him start the new prescription now.    Also his creatinine level was somewhat higher at 2.0.  Have patient contact his transplant team to make sure they are aware of his increased creatinine.  Have patient repeat his labs in 1 month to recheck his TSH and creatinine.  Nonfasting lab visit.

## 2021-06-26 NOTE — PROGRESS NOTES
Progress Notes by Gunnar Ro MD at 4/18/2018 11:10 AM     Author: Gunnar Ro MD Service: -- Author Type: Physician    Filed: 4/18/2018 12:24 PM Encounter Date: 4/18/2018 Status: Signed    : Gunnar Ro MD (Physician)                  [email or web address]  [phone]         Thank you Dr. Wolff for asking the [facility] Heart Care team to participate in the care of your patient, Everton Larios.     Impression and Plan     1. Cardiomyopathy (non-ischemic). Most recent echocardiogram revealed some improvement in LV systolic performance with ejection fraction of 35% 20 January 2016 (previously 25% based on echocardiogram October 2014). Patient appears volume neutral and clinical exam. He is appropriately on lisinopril as well as carvedilol. No dose increase due to relative hypotension at times. Consideration has been given in the past to the addition of an aldosterone inhibitor, though with his tendency toward hyperkalemia this has been deferred.   His weights have been quite stable.  Overall he continues to be fairly pleased with how he is performing from a cardiovascular standpoint.  Plan:     2. Atrial fibrillation. This is reported as paroxysmal. This has been subjectively and objectively quiescent  on most recent device interrogations (see Cardiac Diagnostics section below). Patient has been maintained on warfarin therapy for CVA prophylaxis. Parenthetically, he does have a history of having suffered a splenic infarct in 2011.     3. History of ventricular arrhythmias. He is maintained on amiodarone vis-à-vis history of atrial fibrillation as well. Most recent device interrogation revealed no significant ventricular rhythm disturbances.     Plan on follow-up in one year.  Patient will also be followed intermittently through device clinic per protocol.         History of Present Illness    Once again I would like to thank you again for asking me to participate in the  care of your patient, Everton Larios.  As you know, but to reiterate for my own records, Everton Larios is a 53 y.o. male with history of non-ischemic cardiomyopathy.  Most recent echocardiogram 25 January 2015 revealed ejection fraction of 15-20%.     Patient also has a history of paroxysmal atrial fibrillation and also ventricular arrhythmias.     Patient underwent successful ICD generator replacement 9 February 2018.     Parenthetically, the patient did undergo atrial septal defect closure/repair at age 3.     On interview today, patient states in general he has been feeling comfortable.   His breathing is comfortable and he denies shortness of breath or chest discomfort. He reports no shortness of breath at night to suggest PND/orthopnea. He reports no palpitations or lightheadedness. Everton also denies any chest pain symptoms.      Further review of systems is otherwise negative/noncontributory (medical record and 13 point review of systems reviewed as well and pertinent positives noted).         Cardiac Diagnostics      Echocardiogram 25 January 2018:  1. Mild left ventricular enlargement with severe depression of left ventricular systolic performance.  Ejection fraction 15-20%.  2. Mild to moderate mitral insufficiency.  3. Normal right ventricular size.  Reduced right ventricular function, not quantified.  4. Severe left atrial enlargement.  Mild right atrial enlargement.    Echocardiogram 20 January 2016:  1. Normal left ventricular size with moderate depression in left ventricular systolic performance. Ejection fraction 35%.  2. There is global reduction in left ventricular systolic performance with severe septal hypokinesis.  3. There is mild to moderate reduction in right ventricular systolic performance.  4. No significant valvular heart disease.  5. Moderate bi-atrial enlargement.    Echocardiogram October 2014 (formal report not available):  1. Ejection fraction of 25% per narrative provided by Dr. Alcala  "New.     Echocardiogram (personally reviewed) 16 June 2011:  1. Normal left ventricular size with severe reduction in left ventricular systolic performance. Ejection fraction 20%.  2. Severe global reduction in left ventricular systolic performance.  3. No significant valvular heart disease.  4. Normal right ventricular size with borderline reduction in right ventricular systolic performance.  5. Severe left atrial enlargement. Moderate right atrial enlargement.  6. There is a very small posterior pericardial effusion without evidence of Not physiology.  7. IVC appears dilated with decreased phasic variation and cable diameter consistent with increased right atrial pressure.    Regadenoson nuclear perfusion study 16 June 2011:  1. Mild to moderate ischemia of medium sized area in the inferolateral wall of the left ventricle suggested. No evidence of scar.  2. Mild global reduction in left ventricular systolic performance with ejection fraction of 40-45% with mild septal hypokinesis.    Coronary angiography 17 June 2011:  1. Normal angiographic appearing coronary arteries with right coronary arterial dominant system.  2. Moderate to severe global left ventricular hypokinesis with calculated ejection fraction of 42%.    Device interrogation 16 February 2018 ([employer] G141 INOGEN CRT-D device implanted 9 February 2018):  1. Presenting: AS BiVP, frequent PVC, 65bpm  2. Lead/Battery Status: Stable  3. Atrial Arrhythmias: None detected  4. Vent Arrhythmias: None detected.  5. Anticoagulant: Warfarin.  6. Comments: Normal device function. BiV pacing 84%, likely related to PVCs.    12-lead ECG (personally reviewed) 16 June 2011: Sinus rhythm with ventricular demand pacing (atrial sensed and ventricular pace) occasional PVCs.            Physical Examination       BP 96/64 (Patient Site: Left Arm, Patient Position: Sitting, Cuff Size: Adult Regular)  Pulse 60  Resp 16  Ht 5' 8\" (1.727 m)  Wt 165 lb 6.4 oz (75 " kg)  BMI 25.15 kg/m2        Wt Readings from Last 3 Encounters:   04/18/18 165 lb 6.4 oz (75 kg)   04/12/18 161 lb (73 kg)   04/02/18 157 lb (71.2 kg)     The patient is alert and oriented times three. Sclerae are anicteric. Mucosal membranes are moist. Jugular venous pressure is normal. No significant adenopathy/thyromegally appreciated. Lungs are clear with good expansion. On cardiovascular exam, the patient has a regular S1 and S2. Abdomen is soft and non-tender. Extremities reveal no clubbing, cyanosis, or edema.           Family History/Social History/Risk Factors   Patient does not smoke.  Family history of hypertension.          Medications  Allergies   Current Outpatient Prescriptions   Medication Sig Dispense Refill   ? amiodarone (PACERONE) 200 MG tablet TAKE 1 TABLET BY MOUTH EVERY DAY 90 tablet 0   ? azelastine (ASTELIN) 137 mcg (0.1 %) nasal spray 2 sprays into each nostril 2 (two) times a day. Use in each nostril as directed 30 mL 5   ? carvedilol (COREG) 12.5 MG tablet TAKE 1 TABLET BY MOUTH TWICE DAILY 180 tablet 3   ? dicyclomine (BENTYL) 10 MG capsule Take 10 mg by mouth 3 (three) times a day.     ? DULoxetine (CYMBALTA) 20 MG capsule TAKE 1 CAPSULE BY MOUTH ONCE DAILY 90 capsule 2   ? ergocalciferol (ERGOCALCIFEROL) 50,000 unit capsule Take 50,000 Units by mouth once a week. Take for 8 weeks  0   ? fluticasone (FLONASE) 50 mcg/actuation nasal spray SPRAY TWICE IN EACH NOSTRIL ONCE A DAY 16 g 2   ? fluticasone (FLOVENT HFA) 220 mcg/actuation inhaler Inhale 2 puffs 2 (two) times a day. 1 Inhaler 1   ? furosemide (LASIX) 80 MG tablet Take 1 tablet in the morning and one half in the afternoon 180 tablet 1   ? levothyroxine (SYNTHROID, LEVOTHROID) 50 MCG tablet TAKE 1 TABLET BY MOUTH EVERY DAY 90 tablet 3   ? lisinopril (PRINIVIL,ZESTRIL) 5 MG tablet Take 0.5 tablets (2.5 mg total) by mouth daily. 45 tablet 1   ? mesalamine (ASACOL HD) 800 mg TbEC EC tablet TK 3 Tabets PO BID  1   ? montelukast  (SINGULAIR) 10 mg tablet Take 1 tablet (10 mg total) by mouth at bedtime. 90 tablet 3   ? nitroglycerin (NITROSTAT) 0.3 MG SL tablet Place 0.3 mg under the tongue every 5 (five) minutes as needed for chest pain.     ? omeprazole (PRILOSEC) 20 MG capsule TAKE 1 CAPSULE BY MOUTH ONCE DAILY 90 capsule 3   ? ondansetron (ZOFRAN-ODT) 4 MG disintegrating tablet Take 4 mg by mouth every 8 (eight) hours as needed.  0   ? polyethylene glycol (MIRALAX) 17 gram packet Take 17 g by mouth 2 (two) times a day.     ? potassium chloride SA (K-DUR,KLOR-CON) 20 MEQ tablet TAKE 1 TABLET(20 MEQ) BY MOUTH DAILY 90 tablet 11   ? tamsulosin (FLOMAX) 0.4 mg Cp24 TAKE 1 CAPSULE BY MOUTH DAILY AT DINNER 90 capsule 2   ? VENTOLIN HFA 90 mcg/actuation inhaler INHALE 2 PUFFS EVERY 6 HOURS AS NEEDED FOR WHEEZING 18 g 5   ? cholecalciferol, vitamin D3, 400 unit Tab Take 400 Units by mouth daily.     ? potassium chloride 20 mEq TbER Take 1 tablet by mouth daily.  8   ? senna-docusate (SENNOSIDES-DOCUSATE SODIUM) 8.6-50 mg tablet Take 1 tablet by mouth 2 (two) times a day as needed for constipation. (Patient taking differently: Take 1 tablet by mouth 3 (three) times a day. )  0   ? warfarin (COUMADIN) 5 MG tablet Take 1-1 1/2 tablets (5-7.5 mg) by mouth daily as directed. Adjust dose per INR results. 120 tablet 1     No current facility-administered medications for this visit.       Allergies   Allergen Reactions   ? Codeine      nausea          Lab Results   Lab Results   Component Value Date     04/02/2018    K 3.9 04/02/2018     04/02/2018    CO2 24 04/02/2018    BUN 13 04/02/2018    CREATININE 0.92 04/02/2018    CALCIUM 9.1 04/02/2018     Lab Results   Component Value Date    WBC 5.9 04/02/2018    WBC 6.0 06/02/2015    HGB 15.6 04/02/2018    HCT 50.4 04/02/2018    MCV 96 04/02/2018     04/02/2018     Lab Results   Component Value Date    CHOL 201 (H) 09/30/2010    TRIG 75 09/30/2010    HDL 58 09/30/2010    LDLCALC 128.0  09/30/2010     Lab Results   Component Value Date    INR 2.60 (H) 04/12/2018    INR 2.20 (!) 01/12/2016     Lab Results   Component Value Date     (H) 04/02/2018     Lab Results   Component Value Date    TROPONINI 0.03 04/02/2018    TROPONINI <0.01 06/17/2011    TROPONINI 0.01 06/16/2011     Lab Results   Component Value Date    TSH 5.52 (H) 01/15/2018

## 2021-06-26 NOTE — PROGRESS NOTES
Progress Notes by Bhumika Vera CNP at 11/7/2018 10:30 AM     Author: Bhumika Vera CNP Service: -- Author Type: Nurse Practitioner    Filed: 11/7/2018 11:40 AM Encounter Date: 11/7/2018 Status: Signed    : Bhumika Vera CNP (Nurse Practitioner)           Click to link to [facility] HEART [facility] NOTE      Assessment/Recommendations   Assessment:    1. Nonischemic cardiomyopathy with systolic dysfunction, NYHA class III: Compensated.  He continues to have fatigue and dyspnea on exertion.  His weights have remained stable.  He continues to follow a low-sodium diet.  He will call the [facility] either today or tomorrow to find out the results of his right heart cath that he had completed last week.  We discussed that he should contact the [facility] if he is having issues with his heart failure.    Plan:  1.  Continue current medications  2.  Continue daily weights and low-sodium diet  3.  Continue regular exercise    Everton Larios will follow up with Dr. Ro in February and in the heart failure clinic as needed.     History of Present Illness    Mr. Everton Larios is a 53 y.o. male seen at [facility] heart failure clinic today for continued follow-up.  He follows up for nonischemic cardiomyopathy with systolic dysfunction.  His most recent echocardiogram was done August 7, 2018 which showed an ejection fraction of 16% along with mild to moderate mitral regurgitation.  He has a past medical history significant for paroxysmal atrial fibrillation.  He has a history of ASD repair at the age of 3.  He had a CRT-D placed November 2008 with a generator change February 9, 2018.  He has been following with the [facility] heart failure team.  He had a right heart cath done November 2, 2018 which showed elevated biventricular filling pressures with a mean PA of 33 mmHg. Reviewed [facility] records through [facility]. He is unsure of their plan for him.    Today, he  comes in stating his fatigue and dyspnea on exertion have improved.  He denies orthopnea or PND.  He denies abdominal bloating or lower extremity edema.  He denies lightheadedness, shortness of breath, orthopnea, PND, palpitations, chest pain, abdominal fullness/bloating and lower extremity edema.      He is monitoring home weights which are stable between 138-143 pounds.  He is following a low sodium diet.         Physical Examination Review of Systems   Vitals:    11/07/18 1035   BP: 98/50   Pulse: 66   Resp: 12     Body mass index is 22.68 kg/(m^2).  Wt Readings from Last 3 Encounters:   11/07/18 147 lb (66.7 kg)   10/30/18 146 lb 14.4 oz (66.6 kg)   10/02/18 158 lb (71.7 kg)       General Appearance:     Alert, cooperative and in no acute distress.   ENT/Mouth: membranes moist, no oral lesions or bleeding gums.      EYES:  no scleral icterus, normal conjunctivae   Neck: no carotid bruits or thyromegaly   Chest/Lungs:   lungs are clear to auscultation, no rales or wheezing, respirations unlabored   Cardiovascular:   Regular. Normal first and second heart sounds with no murmurs, rubs, or gallops; the carotid, radial and posterior tibial pulses are intact, Jugular venous pressure normal, no edema   Abdomen:  Soft, nontender, nondistended, bowel sounds present   Extremities: no cyanosis or clubbing   Skin: warm, dry.    Neurologic: mood and affect are appropriate, alert and oriented x3      General: WNL  Eyes: WNL     Lungs: Shortness of Breath, Wheezing  Heart: Chest Pain, Irregular Heartbeat  Stomach: Constipation  Bladder: Frequent Urination at Night  Muscle/Joints: Muscle Weakness  Skin: WNL  Nervous System: Daytime Sleepiness  Mental Health: WNL     Blood: WNL     Medical History  Surgical History Family History Social History   Past Medical History:   Diagnosis Date   ? Arthritis     hands, neck   ? ASD (atrial septal defect)     s/p repair age 3   ? Asthma    ? Atrial fibrillation (H)    ? Pierce's esophagus     ? Cataract    ? CHF (congestive heart failure) (H)    ? Clotting disorder (H)    ? Congenital cardiomyopathy in  (H)    ? Depression    ? DJD (degenerative joint disease)     neck   ? History of anesthesia complications     nausea   ? History of transfusion    ? Hypothyroidism    ? ICD (implantable cardioverter-defibrillator) in place    ? Pacemaker    ? Ulcerative colitis (H)    ? Ventricular tachycardia (H)     Past Surgical History:   Procedure Laterality Date   ? ABLATION OF DYSRHYTHMIC FOCUS      for A fib   ? ASD REPAIR     ? CARDIAC DEFIBRILLATOR PLACEMENT      x2--last was 2018   ? COLONOSCOPY N/A 2015    Procedure: COLONOSCOPY with biopsy;  Surgeon: Fernie Akers MD;  Location: [facility];  Service:    ? COLONOSCOPY N/A 2017    Procedure: COLONOSCOPY with biopsies and polypectomies using snare;  Surgeon: Gael Romero MD;  Location: [facility];  Service:    ? ESOPHAGOGASTRODUODENOSCOPY N/A 2017    Procedure: ESOPHAGOGASTRODUODENOSCOPY (EGD) with biopsies;  Surgeon: Gael Romero MD;  Location: [facility];  Service:    ? HAND SURGERY Left    ? LIPOMA RESECTION      x2   ? LA REVISE MEDIAN N/CARPAL TUNNEL SURG  2016    Procedure: OPEN RIGHT CARPAL TUNNEL RELEASE CORTISONE INJECTION RIGHT THUMB;  Surgeon: Duong Santoyo MD;  Location: [facility];  Service: Orthopedics    Family History   Problem Relation Age of Onset   ? Hypertension Father    ? Endometrial cancer Mother    ? Endometrial cancer Maternal Grandmother     Social History     Social History   ? Marital status: Single     Spouse name: N/A   ? Number of children: N/A   ? Years of education: N/A     Occupational History   ? Not on file.     Social History Main Topics   ? Smoking status: Former Smoker     Packs/day: 1.00     Years: 25.00     Quit date: 2008   ? Smokeless tobacco: Never Used   ? Alcohol use 3.6 oz/week     6 Standard drinks or equivalent per week      Comment: 2  drinks 3 times weekly when out with friends   ? Drug use: No   ? Sexual activity: Not on file     Other Topics Concern   ? Not on file     Social History Narrative    He works part-time at [employer].          Medications  Allergies   Current Outpatient Prescriptions   Medication Sig Dispense Refill   ? albuterol (PROAIR HFA;PROVENTIL HFA;VENTOLIN HFA) 90 mcg/actuation inhaler Inhale 2 puffs.     ? amiodarone (PACERONE) 200 MG tablet Take 200 mg by mouth.     ? azelastine (ASTELIN) 137 mcg (0.1 %) nasal spray 2 sprays into each nostril 2 (two) times a day. Use in each nostril as directed 30 mL 5   ? carvedilol (COREG) 12.5 MG tablet Take 12.5 mg by mouth.     ? dicyclomine (BENTYL) 10 MG capsule Take 10 mg by mouth.     ? DULoxetine (CYMBALTA) 20 MG capsule Take 1 capsule by mouth.     ? fluticasone (FLONASE) 50 mcg/actuation nasal spray Apply 2 sprays into each nostril daily.     ? furosemide (LASIX) 80 MG tablet Take 80 mg in the morning and 40 mg in the afternoon 180 tablet 3   ? furosemide (LASIX) 80 MG tablet Take one tab (80 mg) in am and 1/2 tab (40 mg) in pm     ? levothyroxine (SYNTHROID, LEVOTHROID) 88 MCG tablet Take 1 tablet (88 mcg total) by mouth daily. 90 tablet 1   ? mesalamine (ASACOL HD) 800 mg TbEC EC tablet Take 2,400 mg by mouth 2 (two) times a day.     ? mesalamine (ASACOL HD) 800 mg TbEC EC tablet Take 2,400 mg by mouth.     ? montelukast (SINGULAIR) 10 mg tablet TAKE 1 TABLET BY MOUTH AT BEDTIME 90 tablet 3   ? montelukast (SINGULAIR) 10 mg tablet Take 10 mg by mouth.     ? omeprazole (PRILOSEC) 40 MG capsule Take 40 mg by mouth every evening.     ? omeprazole (PRILOSEC) 40 MG capsule Take 40 mg by mouth.     ? ondansetron (ZOFRAN-ODT) 4 MG disintegrating tablet Take 4 mg by mouth every 8 (eight) hours as needed.  0   ? ondansetron (ZOFRAN-ODT) 4 MG disintegrating tablet Take 4 mg by mouth.     ? polyethylene glycol (MIRALAX) 17 gram packet Take 17 g by mouth 4 (four) times a day.      ?  polyethylene glycol (MIRALAX) 17 gram packet Take 17 g by mouth.     ? potassium chloride (K-DUR,KLOR-CON) 20 MEQ tablet TAKE 1 TABLET(20 MEQ) BY MOUTH DAILY 90 tablet 1   ? potassium chloride (K-DUR,KLOR-CON) 20 MEQ tablet Take 40 mEq by mouth.     ? simethicone (GAS-X) 125 mg cap capsule Take 125 mg by mouth 4 (four) times a day as needed for flatulence.     ? simethicone (GAS-X) 125 mg cap capsule Take 1 capsule by mouth.     ? spironolactone (ALDACTONE) 50 MG tablet Take 1 tablet (50 mg total) by mouth daily. 90 tablet 2   ? tamsulosin (FLOMAX) 0.4 mg cap TAKE 1 CAPSULE BY MOUTH DAILY AT DINNER 90 capsule 3   ? tamsulosin (FLOMAX) 0.4 mg cap Take 1 capsule by mouth.     ? VENTOLIN HFA 90 mcg/actuation inhaler TAKE 2 PUFFS BY MOUTH EVERY 6 HOURS AS NEEDED FOR WHEEZE 18 Inhaler 0   ? warfarin (COUMADIN/JANTOVEN) 5 MG tablet Take 1/2-1 tablet (2.5-5 mg) daily as directed. Adjust dose per INR results. 90 tablet 1   ? warfarin (COUMADIN/JANTOVEN) 5 MG tablet Take 2.5 mg 2 dys/wk and 5 mg all others       No current facility-administered medications for this visit.       Allergies   Allergen Reactions   ? Lisinopril Other (See Comments)     hypotension   ? Codeine Nausea Only     nausea         Lab Results    Chemistry CBC BNP   Lab Results   Component Value Date    CREATININE 1.06 10/30/2018    BUN 10 10/30/2018     (L) 10/30/2018    K 3.5 10/30/2018    CL 94 (L) 10/30/2018    CO2 29 10/30/2018     Creatinine (mg/dL)   Date Value   10/30/2018 1.06   09/20/2018 1.13   09/13/2018 1.03   08/29/2018 1.10    Lab Results   Component Value Date    WBC 7.3 08/06/2018    HGB 13.9 (L) 08/06/2018    HCT 44.4 08/06/2018    MCV 90 08/06/2018     08/06/2018    Lab Results   Component Value Date    BNP 2374 (H) 08/06/2018     BNP (pg/mL)   Date Value   08/06/2018 2374 (H)   04/02/2018 999 (H)   11/02/2016 174 (H)          15 minutes were spent with the patient with greater than 50% spent on education and  counseling.      Bhumika Vera, [facility]   Heart Failure Clinic

## 2021-06-26 NOTE — PROGRESS NOTES
Progress Notes by Bhumika Vera CNP at 8/29/2018 10:30 AM     Author: Bhumika Vera CNP Service: -- Author Type: Nurse Practitioner    Filed: 8/29/2018 11:59 AM Encounter Date: 8/29/2018 Status: Signed    : Bhumika Vera CNP (Nurse Practitioner)           Click to link to [facility] HEART CARE NOTE      Assessment/Recommendations   Assessment:    1. Nonischemic cardiomyopathy with systolic dysfunction, NYHA class III: Well compensated.  He continues to lose weight since hospitalization.  He continues to have dyspnea on exertion and fatigue.  He tries to follow a low-sodium diet.  He is interested and consulting with the [facility] at their advanced heart failure program.  I will have his paperwork faxed to them today.    2.  Paroxysmal atrial fibrillation: He is on warfarin for anticoagulation.  He continues amiodarone 200 mg daily.    Plan:  1.   Heart failure medications:  - Beta blocker therapy with carvedilol increased to 15.625 mg twice a day  - ACEI therapy with lisinopril 2.5 mg daily  - Aldosterone blocker therapy with spironolactone 25 mg daily.    - Diuretic therapy with furosemide decreased to 80 mg in the morning and 40 mg in the afternoon due to continued weight loss  2.  BMP pending  3.  Monitor for signs or symptoms of fluid retention with decrease of Lasix  4.  Continue daily weights and low-sodium diet  5.  Referral to [facility] advanced heart failure program    Everton Larios will follow up with Dr. Casas September 13, Dr. Ro in 6 weeks, and in the heart failure clinic in 10 weeks.     History of Present Illness    Mr. Everton Larios is a 53 y.o. male seen at [facility] heart failure clinic today for continued follow-up.  He follows up for nonischemic cardiomyopathy with systolic dysfunction.  His most recent echocardiogram was done August 7, 2018 which showed ejection fraction of 16% along with mild to moderate mitral regurgitation.   He was recently hospitalized August 6 - August 11, 2018 for acute on chronic heart failure.  He received IV diuretics and lost 12 pounds of fluid during hospitalization.  He has a past medical history significant for atrial fibrillation, he has a history of ASD repair at the age of 3.  He had a CRT-D placed November 2008 with a generator change February 9, 2018.    Today, he comes in with continued dyspnea on exertion and fatigue.  He denies orthopnea or PND.  He denies abdominal bloating or lower extremity edema.  He denies lightheadedness, shortness of breath, orthopnea, PND, palpitations, chest pain, abdominal fullness/bloating and lower extremity edema.      He is monitoring home weights which are stable between 141-144 pounds.  He is trying to follow a low sodium diet.      ECHO 8/7/2018:   Summary     When compared to the previous study dated 1/25/2018, no significant change.    Left ventricle ejection fraction is severely decreased. The calculated left ventricular ejection fraction is 16%.    Cavity is mildly increased. The left ventricular wall thickness is normal. E/e' > 15, suggesting elevated LV filling pressures.    The systolic function is mildly reduced. TAPSE is abnormal, which is consistent with abnormal right ventricular systolic function.    Mild to moderate mitral regurgitation.          Physical Examination Review of Systems   Vitals:    08/29/18 1053   BP: 122/60   Pulse: 63   Resp: 12     Body mass index is 22.84 kg/(m^2).  Wt Readings from Last 3 Encounters:   08/29/18 148 lb (67.1 kg)   08/16/18 151 lb (68.5 kg)   08/15/18 150 lb (68 kg)       General Appearance:     Alert, cooperative and in no acute distress.   ENT/Mouth: membranes moist, no oral lesions or bleeding gums.      EYES:  no scleral icterus, normal conjunctivae   Neck: no carotid bruits or thyromegaly   Chest/Lungs:   lungs are clear to auscultation, no rales or wheezing, respirations unlabored   Cardiovascular:   Regular.  Normal first and second heart sounds with no murmurs, rubs, or gallops; the carotid, radial and posterior tibial pulses are intact, Jugular venous pressure normal, no edema   Abdomen:  Soft, nontender, nondistended, bowel sounds present   Extremities: no cyanosis or clubbing   Skin: warm, dry.    Neurologic: mood and affect are appropriate, alert and oriented x3      General: WNL  Eyes: WNL  Ears/Nose/Throat: WNL  Lungs: Shortness of Breath, Wheezing  Heart: WNL  Stomach: Constipation, Diarrhea  Bladder: Frequent Urination at Night  Muscle/Joints: Muscle Weakness  Skin: WNL  Nervous System: WNL  Mental Health: WNL     Blood: WNL     Medical History  Surgical History Family History Social History   Past Medical History:   Diagnosis Date   ? Arthritis     hands, neck   ? ASD (atrial septal defect)     s/p repair age 3   ? Asthma    ? Atrial fibrillation (H)    ? Pierce's esophagus    ? Cataract    ? CHF (congestive heart failure) (H)    ? Clotting disorder (H)    ? Congenital cardiomyopathy in  (H)    ? Depression    ? DJD (degenerative joint disease)     neck   ? History of anesthesia complications     nausea   ? History of transfusion    ? Hypothyroidism    ? ICD (implantable cardioverter-defibrillator) in place    ? Pacemaker    ? Ulcerative colitis (H)    ? Ventricular tachycardia (H)     Past Surgical History:   Procedure Laterality Date   ? ABLATION OF DYSRHYTHMIC FOCUS      for A fib   ? ASD REPAIR     ? CARDIAC DEFIBRILLATOR PLACEMENT      x2--last was 2018   ? COLONOSCOPY N/A 2015    Procedure: COLONOSCOPY with biopsy;  Surgeon: Fernie Akers MD;  Location: [facility];  Service:    ? COLONOSCOPY N/A 2017    Procedure: COLONOSCOPY with biopsies and polypectomies using snare;  Surgeon: Gael Romero MD;  Location: [facility];  Service:    ? ESOPHAGOGASTRODUODENOSCOPY N/A 2017    Procedure: ESOPHAGOGASTRODUODENOSCOPY (EGD) with biopsies;  Surgeon: Gael Romero  MD;  Location: [facility];  Service:    ? HAND SURGERY Left    ? LIPOMA RESECTION      x2   ? NC REVISE MEDIAN N/CARPAL TUNNEL SURG  9/21/2016    Procedure: OPEN RIGHT CARPAL TUNNEL RELEASE CORTISONE INJECTION RIGHT THUMB;  Surgeon: Duong Santoyo MD;  Location: [facility] OR;  Service: Orthopedics    Family History   Problem Relation Age of Onset   ? Hypertension Father    ? Endometrial cancer Mother    ? Endometrial cancer Maternal Grandmother     Social History     Social History   ? Marital status: Single     Spouse name: N/A   ? Number of children: N/A   ? Years of education: N/A     Occupational History   ? Not on file.     Social History Main Topics   ? Smoking status: Former Smoker     Packs/day: 1.00     Years: 25.00     Quit date: 11/6/2008   ? Smokeless tobacco: Former User   ? Alcohol use 3.6 oz/week     6 Standard drinks or equivalent per week      Comment: 2 drinks 3 times weekly when out with friends   ? Drug use: No   ? Sexual activity: Not on file     Other Topics Concern   ? Not on file     Social History Narrative    He works part-time at [employer].          Medications  Allergies   Current Outpatient Prescriptions   Medication Sig Dispense Refill   ? albuterol (VENTOLIN HFA) 90 mcg/actuation inhaler INHALE 2 PUFFS EVERY 6 HOURS AS NEEDED FOR WHEEZING 1 Inhaler 0   ? amiodarone (PACERONE) 200 MG tablet TAKE 1 TABLET BY MOUTH EVERY DAY 90 tablet 0   ? azelastine (ASTELIN) 137 mcg (0.1 %) nasal spray 2 sprays into each nostril 2 (two) times a day. Use in each nostril as directed 30 mL 5   ? carvedilol (COREG) 12.5 MG tablet TAKE 1 TABLET BY MOUTH TWICE DAILY 180 tablet 3   ? dicyclomine (BENTYL) 10 MG capsule Take 10 mg by mouth 3 (three) times a day as needed.      ? DULoxetine (CYMBALTA) 30 MG capsule Take 1 capsule (30 mg total) by mouth daily. 90 capsule 0   ? fluticasone (FLONASE) 50 mcg/actuation nasal spray Apply 2 sprays into each nostril daily.     ? fluticasone (FLOVENT HFA) 220  mcg/actuation inhaler INHALE 2 BY MOUTH PUFFS 2 TIMES A DAY. 1 Inhaler 5   ? furosemide (LASIX) 80 MG tablet Take 80 mg in the morning and 40 mg in the afternoon 180 tablet 3   ? levothyroxine (SYNTHROID, LEVOTHROID) 50 MCG tablet TAKE 1 TABLET BY MOUTH EVERY DAY 90 tablet 3   ? lisinopril (PRINIVIL,ZESTRIL) 5 MG tablet Take 0.5 tablets (2.5 mg total) by mouth daily. (Patient taking differently: Take 2.5 mg by mouth every evening. ) 45 tablet 1   ? mesalamine (ASACOL HD) 800 mg TbEC EC tablet Take 2,400 mg by mouth 2 (two) times a day.     ? montelukast (SINGULAIR) 10 mg tablet TAKE 1 TABLET BY MOUTH AT BEDTIME 90 tablet 3   ? omeprazole (PRILOSEC) 40 MG capsule Take 40 mg by mouth every evening.     ? ondansetron (ZOFRAN-ODT) 4 MG disintegrating tablet Take 4 mg by mouth every 8 (eight) hours as needed.  0   ? polyethylene glycol (MIRALAX) 17 gram packet Take 17 g by mouth 4 (four) times a day.      ? potassium chloride (K-DUR,KLOR-CON) 20 MEQ tablet TAKE 1 TABLET(20 MEQ) BY MOUTH DAILY 90 tablet 1   ? simethicone (GAS-X) 125 mg cap capsule Take 125 mg by mouth 4 (four) times a day as needed for flatulence.     ? spironolactone (ALDACTONE) 50 MG tablet Take 0.5 tablets (25 mg total) by mouth daily. 45 tablet 3   ? tamsulosin (FLOMAX) 0.4 mg Cp24 TAKE 1 CAPSULE BY MOUTH DAILY AT DINNER 90 capsule 2   ? warfarin (COUMADIN) 5 MG tablet Take 2.5-5 mg by mouth See Admin Instructions. Take 2.5 mg on Sunday and Wednesday; take 5mg the rest of the week     ? carvedilol (COREG) 3.125 MG tablet Take with one 12.5 mg tablet to take 15.625 mg twice a day 180 tablet 3     No current facility-administered medications for this visit.       Allergies   Allergen Reactions   ? Codeine      nausea         Lab Results    Chemistry CBC BNP   Lab Results   Component Value Date    CREATININE 1.36 (H) 08/15/2018    BUN 14 08/15/2018     08/15/2018    K 4.5 08/15/2018     08/15/2018    CO2 23 08/15/2018     Creatinine (mg/dL)    Date Value   08/15/2018 1.36 (H)   08/10/2018 1.03   08/09/2018 1.13   08/06/2018 1.20    Lab Results   Component Value Date    WBC 7.3 08/06/2018    HGB 13.9 (L) 08/06/2018    HCT 44.4 08/06/2018    MCV 90 08/06/2018     08/06/2018    Lab Results   Component Value Date    BNP 2374 (H) 08/06/2018     BNP (pg/mL)   Date Value   08/06/2018 2374 (H)   04/02/2018 999 (H)   11/02/2016 174 (H)          25 minutes were spent with the patient with greater than 50% spent on education and counseling.      Bhumika Vera, [facility]   Heart Failure Clinic

## 2021-07-01 ENCOUNTER — TELEPHONE (OUTPATIENT)
Dept: GASTROENTEROLOGY | Facility: CLINIC | Age: 56
End: 2021-07-01

## 2021-07-01 VITALS
HEART RATE: 108 BPM | SYSTOLIC BLOOD PRESSURE: 142 MMHG | TEMPERATURE: 97.4 F | DIASTOLIC BLOOD PRESSURE: 80 MMHG | BODY MASS INDEX: 25.82 KG/M2 | WEIGHT: 169.8 LBS

## 2021-07-01 NOTE — TELEPHONE ENCOUNTER
Spoke with pt and assisted with scheduling pt with fellow. Pt only available on Tuesdays or Thursdays.

## 2021-07-03 NOTE — ADDENDUM NOTE
Addendum Note by Slime Ramos RN at 1/8/2018  2:45 PM     Author: Slime Ramos RN Service: -- Author Type: Registered Nurse    Filed: 1/8/2018  2:45 PM Encounter Date: 1/8/2018 Status: Signed    : Slime Ramos RN (Registered Nurse)    Addended by: SLIME RAMOS on: 1/8/2018 02:45 PM        Modules accepted: Orders

## 2021-07-06 DIAGNOSIS — Z94.1 TRANSPLANTED HEART (H): Primary | ICD-10-CM

## 2021-07-11 ENCOUNTER — HEALTH MAINTENANCE LETTER (OUTPATIENT)
Age: 56
End: 2021-07-11

## 2021-07-14 PROBLEM — I50.9 CHF (CONGESTIVE HEART FAILURE) (H): Status: RESOLVED | Noted: 2018-08-06 | Resolved: 2018-11-07

## 2021-07-19 NOTE — TELEPHONE ENCOUNTER
REFERRAL INFORMATION:    Referring Provider:  Dr. Fredrick Wolff     Referring Clinic:  [facility]     Reason for Visit/Diagnosis: UC- new DX     FUTURE VISIT INFORMATION:    Appointment Date: 8/31/2021    Appointment Time: 1 PM      NOTES STATUS DETAILS   OFFICE NOTE from Referring Provider Internal  6/22/2021 Office visit with Dr. Wolff     OFFICE NOTE from Other Specialist Internal 11/26/19, 9/17/19 Office visit with Dr. Jocelynn Jensen ([facility] GI)     9/17/19 Office visit with Dr. Ric Shukla ([facility] Infectious Disease)      HOSPITAL DISCHARGE SUMMARY/  ED VISITS Internal 9/23/19 ([facility])    OPERATIVE REPORT Received Sigmoidoscopy: 3/28/16 ([facility])    MEDICATION LIST Internal         ENDOSCOPY  Received EGD: 12/19.17 ([facility])    COLONOSCOPY Internal/ Received  2/20/2020 11/13/18 ([facility])  12/19/17 ([facility])      ERCP N/A    EUS N/A    STOOL TESTING N/A    PERTINENT LABS Internal    PATHOLOGY REPORTS (RELATED) N/A    IMAGING (CT, MRI, EGD, MRCP, Small Bowel Follow Through/SBT, MR/CT Enterography) Internal MR Abdomen: 12/6/19  US Abdomen: 3/26/19, 11/17/18

## 2021-07-27 ENCOUNTER — LAB (OUTPATIENT)
Dept: LAB | Facility: CLINIC | Age: 56
End: 2021-07-27
Payer: COMMERCIAL

## 2021-07-27 ENCOUNTER — OFFICE VISIT (OUTPATIENT)
Dept: DERMATOLOGY | Facility: CLINIC | Age: 56
End: 2021-07-27
Payer: COMMERCIAL

## 2021-07-27 DIAGNOSIS — E03.9 HYPOTHYROIDISM, UNSPECIFIED TYPE: ICD-10-CM

## 2021-07-27 DIAGNOSIS — L91.0 HYPERTROPHIC SCAR: ICD-10-CM

## 2021-07-27 DIAGNOSIS — D84.9 IMMUNOSUPPRESSED STATUS (H): ICD-10-CM

## 2021-07-27 DIAGNOSIS — Z51.81 ENCOUNTER FOR THERAPEUTIC DRUG MONITORING: ICD-10-CM

## 2021-07-27 DIAGNOSIS — D22.9 MULTIPLE BENIGN MELANOCYTIC NEVI: Primary | ICD-10-CM

## 2021-07-27 DIAGNOSIS — Z12.83 SCREENING EXAM FOR SKIN CANCER: ICD-10-CM

## 2021-07-27 DIAGNOSIS — L82.1 SK (SEBORRHEIC KERATOSIS): ICD-10-CM

## 2021-07-27 LAB
ANION GAP SERPL CALCULATED.3IONS-SCNC: 14 MMOL/L (ref 5–18)
BUN SERPL-MCNC: 25 MG/DL (ref 8–22)
CALCIUM SERPL-MCNC: 9.1 MG/DL (ref 8.5–10.5)
CHLORIDE BLD-SCNC: 105 MMOL/L (ref 98–107)
CO2 SERPL-SCNC: 21 MMOL/L (ref 22–31)
CREAT SERPL-MCNC: 1.54 MG/DL (ref 0.7–1.3)
GFR SERPL CREATININE-BSD FRML MDRD: 49 ML/MIN/1.73M2
GLUCOSE BLD-MCNC: 129 MG/DL (ref 70–125)
POTASSIUM BLD-SCNC: 3.8 MMOL/L (ref 3.5–5)
SODIUM SERPL-SCNC: 140 MMOL/L (ref 136–145)
TSH SERPL DL<=0.005 MIU/L-ACNC: 0.04 UIU/ML (ref 0.3–5)

## 2021-07-27 PROCEDURE — 84443 ASSAY THYROID STIM HORMONE: CPT

## 2021-07-27 PROCEDURE — 80048 BASIC METABOLIC PNL TOTAL CA: CPT

## 2021-07-27 PROCEDURE — 36415 COLL VENOUS BLD VENIPUNCTURE: CPT

## 2021-07-27 PROCEDURE — 99203 OFFICE O/P NEW LOW 30 MIN: CPT | Performed by: DERMATOLOGY

## 2021-07-27 ASSESSMENT — PAIN SCALES - GENERAL: PAINLEVEL: NO PAIN (0)

## 2021-07-27 NOTE — PATIENT INSTRUCTIONS
Follow up in 12 months for another full body skin exam.    SUN PROTECTION: SPECIAL RECOMMENDATIONS FOR IMMUNOSUPPRESSED    Why protect my skin from the sun?  People with impaired immune systems are at an increased risk of developing skin cancer because it is more difficult for the body to repair sun damage.      What Causes Immune Suppression?    There are many causes. Some of the most common that we see in our clinics are:    Solid organ transplantation    Bone marrow transplantation    Cancer and cancer treatments    Some genetic syndromes     Medications to treat inflammatory conditions      A dermatologist will work with your medical team to monitor your skin health.  Usually, a yearly visit to the dermatologist is recommended.    Good sun protection is the most important step to protect against skin cancer and sun damage.       How can I protect my skin from the sun?    Avoidance: Staying out of the sun during the peak hours of 10am - 2pm will greatly reduce total UV exposure. Seeking out playgrounds with shade structures, and playing in shady areas of the park or yard are all good first steps to protect yourself.     Sun Protective Clothing:  A variety of options are available for hats, lightweight clothing, and swimwear that block up to 98% of UV rays.  The products are washable and safe for people with sensitive skin.  Also, choose sunglasses with 100% UVA and B absorption to protect your eyes.     Sunscreen Information:  Use a broad spectrum sunscreen with an SPF of at least 30 on all exposed skin.  Sunscreen should be labeled to protect against both UVA and UVB rays.  UVA rays cause skin cancer and skin aging, while UVB rays cause sunburns.      What sunscreen should I use?  There are two main types of sunscreens- physical blockers that reflect the sun's rays, and chemical blockers that absorb the rays before they damage the skin.  The physical blockers are effective as soon as they are applied.  The  chemical blockers take 20 minutes to activate on the skin.     Labels will list these active ingredients:  Physical blockers:  titanium dioxide and zinc oxide  Chemical blockers:  avobenzone, oxybenzone, octylcrylene, mexoryl, and many others     What SPF do I need?  Sunscreen with a sun protective factor (SPF) 30 will block 95-97% of the sun's rays.  Increased SPF above this point adds only minimal increased sun protection.  Choose a sunscreen with at least an SPF of 30.     How often do I need to reapply?  Sunscreen should be reapplied every two hours and after swimming or sweating.      When do I need to wear sunscreen?  Whenever you are outside or riding in a car.  Most people get a large amount of sun exposure when they are in the car.  The front window protects against the sun's rays, but the side windows are far less protective.  The sun can damage the skin even in cold winter climates.  Skin does not need to tan or burn to be damaged by the sun.      How much should I apply?  For a normal-sized adult, one ounce (a shot glass full) of sunscreen should cover the whole body.What about vitamin D?  Some people worry that they need sunlight to get enough vitamin D.  Oral vitamin D, found in foods and supplements, is very effective, and safer than exposing your skin to UV rays.     When should I worry?  It is normal to develop new moles throughout the childhood and teen years. Warning signs for abnormal moles include:    A: Asymmetry, meaning that the mole s shape is not equal on both sides  B: Irregular or indistinct borders  C: Color- multiple colors in a mole   D: Diameter bigger than the eraser on a pencil (6 mm)  E: Evolving or growing rapidly. This is the most concerning change    Other concerning skin changes to talk to your dermatologist about include:    Growing pink bumps    Scaly patches that do not go away    Skin growths that are bleeding or painful    If in doubt, please talk to your physician  promptly.     Resources and References:    Althea HANSEND, Franky RE, Marek G, et al. Solid cancers after allogeneic hematopoietic cell transplantation. Blood 2009;113(5): 1503-2245.    Pipo BP. Cancer in Fanconi anemia, 5051-8891. Cancer 2003; 97: 425-440.    American Academy of Dermatology. Sunscreen FAQs. 2014.  www.aad.org/media-resources/stats-and-facts/prevention-and-care/sunscreens    Skin Cancer Foundation. Sun Protection. 2014. http://www.skincancer.org/prevention/sun-protection    CATA Vieira, Orlando G, LINDA Casanova.  Application patterns among participants randomized to daily sunscreen use in a skin cancer prevention trial. Arch Dermatol. 2002; 138, 9275-7151.    http://www.healthychildren.org/english/safety-prevention/at-play/pages/sun-safety.aspx    Skin Cancer Foundation. Recognizing Skin Cancer. 2014. http://www.skincancer.org/skin-cancer-information

## 2021-07-27 NOTE — PROGRESS NOTES
[facility] Dermatology Note  Encounter Date: Jul 27, 2021  Office Visit     Dermatology Problem List:  1. Hypertrophic scars, L dorsal hand, bilateral forearms    2. Benign nevi, back, arms, face    3. Seborrheic keratoses    4. Organ transplant patient on immunosuppression with tacrolimus and sirolimus. At increased risk for squamous cell carcinoma and melanoma.    ____________________________________________    Assessment & Plan:  # History of organ transplant & family history skin cancer. Patient aware that he is at higher risk for cutaneous malignancy.   - Monitor for changing or symptomatic lesions.   - Continue frequent skin checks and photoprotection.    # Multiple clinically benign nevi on the face, back, and arms.   - Monitor: Patient to continue monitoring at home and will contact the clinic for any changes.  - Sun protection: Counseled SPF30+ sunscreen, UPF clothing, sun avoidance, tanning bed avoidance.    # Seborrheic keratosis, non irritated, umbilicus.   - NTD: No further management at this time.    # Hypertrophic scars, L dorsal hand and bilateral arms  - NTD: No further management at this time.     Procedures Performed:   None      Follow-up: 1 year(s) in-person, or earlier for new or changing lesions    Staff and Scribe:     I, Nisha Robbins, saw and staffed this patient with Dr. Davalos.      Nisha Robbins, MS4  [employer]  Department of Dermatology    Staff Physician:  I was present with the medical student who participated in the service and in the documentation of the note. I have verified the history and personally performed the physical exam and medical decision making. I agree with the assessment and plan of care as documented in the note.     Bairon Davalos MD  Staff Dermatologist and Dermatopathologist  , Department of Dermatology    ____________________________________________    CC: Skin Check (pt states he is here for a  full body skin check.)      HPI:  Mr. Everton Larios is a(n) 56 year old male who presents today as a new patient for transplant skin check. He is on tacrolimus and sirolimus following a heart transplant 2.5 years ago. He has never had a skin check before. He does not have personal history of skin cancer, however does have family history of skin cancer in his mother, possibly melanoma. He does not have any spots of concern. He works as a  and applies sunscreen while working when he feels strong sun. He is wondering what the white spot on his left hand could be.       Patient is otherwise feeling well, without additional skin concerns.    Labs Reviewed:  N/A    Physical Exam:  Vitals: There were no vitals taken for this visit.  SKIN: Total skin excluding the undergarment areas was performed. The exam included the head/face, neck, both arms, chest, back, abdomen, both legs, digits and/or nails.   - There is a waxy stuck on tan to brown papule on the umbilicus.   - Multiple regular brown pigmented macules and papules are identified on the face, back, and arms.   - There is a hypopigmented smooth white plaque with angulated borders on the L dorsal hand, similar in morphology to other linear lesions on his hands, wrists, and forearms.   - No other lesions of concern on areas examined.     Medications:  Current Outpatient Medications   Medication     acetaminophen (TYLENOL) 325 MG tablet     albuterol (PROAIR HFA/PROVENTIL HFA/VENTOLIN HFA) 108 (90 Base) MCG/ACT inhaler     alendronate (FOSAMAX) 70 MG tablet     amLODIPine (NORVASC) 5 MG tablet     aspirin (ASA) 81 MG chewable tablet     calcium carbonate 600 mg-vitamin D 400 units (CALTRATE) 600-400 MG-UNIT per tablet     cetirizine (ZYRTEC) 10 MG tablet     DULoxetine (CYMBALTA) 20 MG EC capsule     fluticasone (FLOVENT HFA) 220 MCG/ACT Inhaler     melatonin 3 MG tablet     multivitamin w/minerals (THERA-VIT-M) tablet     omeprazole (PRILOSEC) 40 MG   capsule     polyethylene glycol (MIRALAX/GLYCOLAX) powder     rosuvastatin (CRESTOR) 10 MG tablet     senna (SENOKOT) 8.6 MG tablet     sirolimus (GENERIC EQUIVALENT) 0.5 MG tablet     tacrolimus (GENERIC EQUIVALENT) 0.5 MG capsule     tacrolimus (GENERIC EQUIVALENT) 1 MG capsule     levothyroxine (SYNTHROID/LEVOTHROID) 75 MCG tablet     ondansetron (ZOFRAN-ODT) 4 MG ODT tab     No current facility-administered medications for this visit.        Past Medical History:   Patient Active Problem List   Diagnosis     H/O congenital atrial septal defect (ASD) repair at age 3     Ulcerative colitis (H)     Hypothyroidism due to medication     DJD (degenerative joint disease) - neck     Pierce's esophagus     Intermittent asthma without complication     Chronic rhinitis     Depression     Iron deficiency anemia     Heart replaced by transplant (H)     Debility     Abnormal white blood cell (WBC) count     Sepsis due to Pseudomonas aeruginosa (H)     Pulmonary nodules     Compression fracture of thoracic vertebra (H)     Central line clotted (H)     FRANKLIN (acute kidney injury) (H)     Transplanted heart (H)     C. difficile diarrhea     Encounter for therapeutic drug monitoring     Age-related osteoporosis with current pathological fracture with routine healing, subsequent encounter     Low TSH level     History of corticosteroid therapy     Status post coronary angiogram     Vasculopathy of cardiac allograft (H)     Past Medical History:   Diagnosis Date     Alcohol abuse      Arthritis     hands, neck     ASD (atrial septal defect)     s/p repair age 3     Asthma      Atrial fibrillation (H)      Pierce's esophagus 10/4/2018     Pierce's esophagus      Cataract      CHF (congestive heart failure) (H)      Chronic rhinitis 10/4/2018     Chronic systolic heart failure (H) 10/4/2018     Clotting disorder (H)      Congenital cardiomyopathy in  (H)      Depression 10/4/2018     Depression      Diastolic dysfunction       DJD (degenerative joint disease)     neck     DJD (degenerative joint disease) - neck 10/4/2018     H/O congenital atrial septal defect (ASD) repair at age 3 10/4/2018     History of anesthesia complications     nausea     History of transfusion      Hypothyroidism      Hypothyroidism due to medication 10/4/2018     ICD (implantable cardioverter-defibrillator) in place      ICD, [employer] 2008; gen change 2/2018 10/4/2018     Intermittent asthma without complication 10/4/2018     Nonischemic cardiomyopathy (H) 10/4/2018     On amiodarone therapy 10/4/2018     Pacemaker      Paroxysmal atrial fibrillation (H) 10/4/2018     S/P ablation of atrial fibrillation 10/4/2018     Systolic heart failure (H)      Ulcerative colitis (H) 2005     Ulcerative colitis (H)      Ventricular tachycardia (H) 10/4/2018     Ventricular tachycardia (H)         CC Referred Self, MD  No address on file on close of this encounter.

## 2021-07-27 NOTE — LETTER
7/27/2021       RE: Everton Larios  [address] [id number]     Dear Colleague,    Thank you for referring your patient, Everton Larios, to the [facility] [place] at [facility]. Please see a copy of my visit note below.    [facility] Dermatology Note  Encounter Date: Jul 27, 2021  Office Visit     Dermatology Problem List:  1. Hypertrophic scars, L dorsal hand, bilateral forearms    2. Benign nevi, back, arms, face    3. Seborrheic keratoses    4. Organ transplant patient on immunosuppression with tacrolimus and sirolimus. At increased risk for squamous cell carcinoma and melanoma.    ____________________________________________    Assessment & Plan:  # History of organ transplant & family history skin cancer. Patient aware that he is at higher risk for cutaneous malignancy.   - Monitor for changing or symptomatic lesions.   - Continue frequent skin checks and photoprotection.    # Multiple clinically benign nevi on the face, back, and arms.   - Monitor: Patient to continue monitoring at home and will contact the clinic for any changes.  - Sun protection: Counseled SPF30+ sunscreen, UPF clothing, sun avoidance, tanning bed avoidance.    # Seborrheic keratosis, non irritated, umbilicus.   - NTD: No further management at this time.    # Hypertrophic scars, L dorsal hand and bilateral arms  - NTD: No further management at this time.     Procedures Performed:   None      Follow-up: 1 year(s) in-person, or earlier for new or changing lesions    Staff and Scribe:     Nisha AGUDELO, saw and staffed this patient with Dr. Davalos.      Nisha Robbins, MS4  [facility]  Department of Dermatology      ____________________________________________    CC: No chief complaint on file.      HPI:  Mr. Everton Larios is a(n) 56 year old male who presents today as a new patient for transplant skin  check. He is on tacrolimus and sirolimus following a heart transplant 2.5 years ago. He has never had a skin check before. He does not have personal history of skin cancer, however does have family history of skin cancer in his mother, possibly melanoma. He does not have any spots of concern. He works as a  and applies sunscreen while working when he feels strong sun. He is wondering what the white spot on his left hand could be.       Patient is otherwise feeling well, without additional skin concerns.    Labs Reviewed:  N/A    Physical Exam:  Vitals: There were no vitals taken for this visit.  SKIN: Total skin excluding the undergarment areas was performed. The exam included the head/face, neck, both arms, chest, back, abdomen, both legs, digits and/or nails.   - There is a waxy stuck on tan to brown papule on the umbilicus.   - Multiple regular brown pigmented macules and papules are identified on the face, back, and arms.   - There is a hypopigmented smooth white plaque with angulated borders on the L dorsal hand, similar in morphology to other linear lesions on his hands, wrists, and forearms.   - No other lesions of concern on areas examined.     Medications:  Current Outpatient Medications   Medication     acetaminophen (TYLENOL) 325 MG tablet     albuterol (PROAIR HFA/PROVENTIL HFA/VENTOLIN HFA) 108 (90 Base) MCG/ACT inhaler     alendronate (FOSAMAX) 70 MG tablet     amLODIPine (NORVASC) 5 MG tablet     aspirin (ASA) 81 MG chewable tablet     calcium carbonate 600 mg-vitamin D 400 units (CALTRATE) 600-400 MG-UNIT per tablet     cetirizine (ZYRTEC) 10 MG tablet     DULoxetine (CYMBALTA) 20 MG EC capsule     fluticasone (FLOVENT HFA) 220 MCG/ACT Inhaler     levothyroxine (SYNTHROID/LEVOTHROID) 100 MCG tablet     melatonin 3 MG tablet     multivitamin w/minerals (THERA-VIT-M) tablet     omeprazole (PRILOSEC) 40 MG DR capsule     ondansetron (ZOFRAN-ODT) 4 MG ODT tab     polyethylene glycol  (MIRALAX/GLYCOLAX) powder     rosuvastatin (CRESTOR) 10 MG tablet     senna (SENOKOT) 8.6 MG tablet     sirolimus (GENERIC EQUIVALENT) 0.5 MG tablet     tacrolimus (GENERIC EQUIVALENT) 0.5 MG capsule     tacrolimus (GENERIC EQUIVALENT) 1 MG capsule     No current facility-administered medications for this visit.        Past Medical History:   Patient Active Problem List   Diagnosis     H/O congenital atrial septal defect (ASD) repair at age 3     Ulcerative colitis (H)     Hypothyroidism due to medication     DJD (degenerative joint disease) - neck     Pierce's esophagus     Intermittent asthma without complication     Chronic rhinitis     Depression     Iron deficiency anemia     Heart replaced by transplant (H)     Debility     Abnormal white blood cell (WBC) count     Sepsis due to Pseudomonas aeruginosa (H)     Pulmonary nodules     Compression fracture of thoracic vertebra (H)     Central line clotted (H)     FRANKLIN (acute kidney injury) (H)     Transplanted heart (H)     C. difficile diarrhea     Encounter for therapeutic drug monitoring     Age-related osteoporosis with current pathological fracture with routine healing, subsequent encounter     Low TSH level     History of corticosteroid therapy     Status post coronary angiogram     Vasculopathy of cardiac allograft (H)     Past Medical History:   Diagnosis Date     Alcohol abuse      Arthritis     hands, neck     ASD (atrial septal defect)     s/p repair age 3     Asthma      Atrial fibrillation (H)      Pierce's esophagus 10/4/2018     Pierce's esophagus      Cataract      CHF (congestive heart failure) (H)      Chronic rhinitis 10/4/2018     Chronic systolic heart failure (H) 10/4/2018     Clotting disorder (H)      Congenital cardiomyopathy in  (H)      Depression 10/4/2018     Depression      Diastolic dysfunction      DJD (degenerative joint disease)     neck     DJD (degenerative joint disease) - neck 10/4/2018     H/O congenital atrial septal  defect (ASD) repair at age 3 10/4/2018     History of anesthesia complications     nausea     History of transfusion      Hypothyroidism      Hypothyroidism due to medication 10/4/2018     ICD (implantable cardioverter-defibrillator) in place      ICD, [employer] 2008; gen change 2/2018 10/4/2018     Intermittent asthma without complication 10/4/2018     Nonischemic cardiomyopathy (H) 10/4/2018     On amiodarone therapy 10/4/2018     Pacemaker      Paroxysmal atrial fibrillation (H) 10/4/2018     S/P ablation of atrial fibrillation 10/4/2018     Systolic heart failure (H)      Ulcerative colitis (H) 2005     Ulcerative colitis (H)      Ventricular tachycardia (H) 10/4/2018     Ventricular tachycardia (H)         CC Referred Self, MD  No address on file on close of this encounter.

## 2021-07-28 ENCOUNTER — TELEPHONE (OUTPATIENT)
Dept: INTERNAL MEDICINE | Facility: CLINIC | Age: 56
End: 2021-07-28

## 2021-07-28 DIAGNOSIS — N18.9 CHRONIC RENAL IMPAIRMENT, UNSPECIFIED CKD STAGE: ICD-10-CM

## 2021-07-28 DIAGNOSIS — E03.2 HYPOTHYROIDISM DUE TO MEDICATION: Chronic | ICD-10-CM

## 2021-07-28 DIAGNOSIS — Z92.241 HISTORY OF CORTICOSTEROID THERAPY: ICD-10-CM

## 2021-07-28 DIAGNOSIS — M80.00XD AGE-RELATED OSTEOPOROSIS WITH CURRENT PATHOLOGICAL FRACTURE WITH ROUTINE HEALING, SUBSEQUENT ENCOUNTER: ICD-10-CM

## 2021-07-28 DIAGNOSIS — R79.89 LOW TSH LEVEL: ICD-10-CM

## 2021-07-28 DIAGNOSIS — E03.9 HYPOTHYROIDISM, UNSPECIFIED TYPE: Primary | ICD-10-CM

## 2021-07-28 RX ORDER — LEVOTHYROXINE SODIUM 75 UG/1
75 TABLET ORAL DAILY
Qty: 90 TABLET | Refills: 3 | Status: SHIPPED | OUTPATIENT
Start: 2021-07-28 | End: 2022-07-20

## 2021-07-28 NOTE — TELEPHONE ENCOUNTER
Contact patient.  Patient was supposed to have reduced his levothyroxine down to 88 mcg a day, but his medication list also has 100 mcg a day.    Please clarify what dose of levothyroxine patient is taking currently.    His TSH, thyroid test, is still too low.  He is still hyperthyroid, or taking too much thyroid hormone.    Have him reduce the levothyroxine down to 75 mcg a day.    Recheck thyroid test/nonfasting labs again in 4 to 6 weeks.    Tell him that his kidney function, or creatinine, did improve back to his previous baseline.

## 2021-07-28 NOTE — TELEPHONE ENCOUNTER
Called pt. He is taking the [facility]. He says he has an appt at [facility] August 31st and he said he will have the labs drawn there. Orders should be able to be seen now that we are on 1 epic system.

## 2021-08-17 ENCOUNTER — TELEPHONE (OUTPATIENT)
Dept: TRANSPLANT | Facility: CLINIC | Age: 56
End: 2021-08-17

## 2021-08-17 NOTE — TELEPHONE ENCOUNTER
Patient Call: has upcoming appts here  [facility]sc can he get the 3rd vaccination here ?        Call back needed? Yes    Return Call Needed  Same as documented in contacts section  When to return call?: Greater than one day: Route standard priority

## 2021-08-18 NOTE — TELEPHONE ENCOUNTER
Pt called inquiring about covid booster shot. Pt encouraged to get it. Pt informed a letter will be going out this week via [employer] with more information.

## 2021-08-23 NOTE — PROGRESS NOTES
"Called by nursing about free air on morning CXR    Patient states abdomen has been \"yucky\" since surgery. States this is mild pain all over, not nausea. Has not changed since surgery. Clinically no change in vitals or pressor requirements. Minimal bowel movements over prior couple weeks    He is awake, interactive, laying flat and still in bed  CV regular  Abd soft, mild distension. Mild tenderness throughout. No zoila peritoneal signs  CTs with serosanguinous output    CXR and AXR show sliver of free air above liver, not present on prior films    Discussed with fellow, can see free air in this setting without bowel perforation. Will monitor clinically for now, if any worsening of clinical exam or vitals will evaluate with further imaging and involve general surgery    Elijah Almaguer MD, PhD  CVTS moonlighter    " Include Location In Plan?: No Detail Level: Zone

## 2021-08-31 ENCOUNTER — PRE VISIT (OUTPATIENT)
Dept: GASTROENTEROLOGY | Facility: CLINIC | Age: 56
End: 2021-08-31

## 2021-08-31 ENCOUNTER — OFFICE VISIT (OUTPATIENT)
Dept: GASTROENTEROLOGY | Facility: CLINIC | Age: 56
End: 2021-08-31
Payer: COMMERCIAL

## 2021-08-31 ENCOUNTER — LAB (OUTPATIENT)
Dept: LAB | Facility: CLINIC | Age: 56
End: 2021-08-31
Payer: COMMERCIAL

## 2021-08-31 ENCOUNTER — ANCILLARY PROCEDURE (OUTPATIENT)
Dept: CARDIOLOGY | Facility: CLINIC | Age: 56
End: 2021-08-31
Attending: INTERNAL MEDICINE
Payer: COMMERCIAL

## 2021-08-31 VITALS
OXYGEN SATURATION: 98 % | DIASTOLIC BLOOD PRESSURE: 99 MMHG | WEIGHT: 180.6 LBS | HEIGHT: 68 IN | BODY MASS INDEX: 27.37 KG/M2 | HEART RATE: 103 BPM | SYSTOLIC BLOOD PRESSURE: 137 MMHG

## 2021-08-31 DIAGNOSIS — K51.00 ULCERATIVE PANCOLITIS WITHOUT COMPLICATION (H): Primary | ICD-10-CM

## 2021-08-31 DIAGNOSIS — Z94.1 TRANSPLANTED HEART (H): ICD-10-CM

## 2021-08-31 DIAGNOSIS — K76.89 LIVER NODULE: ICD-10-CM

## 2021-08-31 DIAGNOSIS — N18.9 CHRONIC RENAL IMPAIRMENT, UNSPECIFIED CKD STAGE: ICD-10-CM

## 2021-08-31 DIAGNOSIS — E03.9 HYPOTHYROIDISM, UNSPECIFIED TYPE: ICD-10-CM

## 2021-08-31 DIAGNOSIS — K22.70 BARRETT'S ESOPHAGUS WITHOUT DYSPLASIA: ICD-10-CM

## 2021-08-31 LAB
ANION GAP SERPL CALCULATED.3IONS-SCNC: 7 MMOL/L (ref 3–14)
BUN SERPL-MCNC: 26 MG/DL (ref 7–30)
CALCIUM SERPL-MCNC: 8.8 MG/DL (ref 8.5–10.1)
CHLORIDE BLD-SCNC: 106 MMOL/L (ref 94–109)
CO2 SERPL-SCNC: 25 MMOL/L (ref 20–32)
CREAT SERPL-MCNC: 1.48 MG/DL (ref 0.66–1.25)
ERYTHROCYTE [DISTWIDTH] IN BLOOD BY AUTOMATED COUNT: 15.9 % (ref 10–15)
GFR SERPL CREATININE-BSD FRML MDRD: 51 ML/MIN/1.73M2
GLUCOSE BLD-MCNC: 109 MG/DL (ref 70–99)
HCT VFR BLD AUTO: 37.5 % (ref 40–53)
HGB BLD-MCNC: 12.3 G/DL (ref 13.3–17.7)
LVEF ECHO: NORMAL
MCH RBC QN AUTO: 24.5 PG (ref 26.5–33)
MCHC RBC AUTO-ENTMCNC: 32.8 G/DL (ref 31.5–36.5)
MCV RBC AUTO: 75 FL (ref 78–100)
PLATELET # BLD AUTO: 216 10E3/UL (ref 150–450)
POTASSIUM BLD-SCNC: 4 MMOL/L (ref 3.4–5.3)
RBC # BLD AUTO: 5.02 10E6/UL (ref 4.4–5.9)
SIROLIMUS BLD-MCNC: 3.9 UG/L (ref 5–15)
SODIUM SERPL-SCNC: 138 MMOL/L (ref 133–144)
TACROLIMUS BLD-MCNC: 5.6 UG/L (ref 5–15)
TME LAST DOSE: ABNORMAL H
TME LAST DOSE: ABNORMAL H
TME LAST DOSE: NORMAL H
TME LAST DOSE: NORMAL H
TSH SERPL DL<=0.005 MIU/L-ACNC: 0.14 MU/L (ref 0.4–4)
WBC # BLD AUTO: 5.7 10E3/UL (ref 4–11)

## 2021-08-31 PROCEDURE — 85027 COMPLETE CBC AUTOMATED: CPT | Performed by: PATHOLOGY

## 2021-08-31 PROCEDURE — 93306 TTE W/DOPPLER COMPLETE: CPT | Performed by: INTERNAL MEDICINE

## 2021-08-31 PROCEDURE — 80195 ASSAY OF SIROLIMUS: CPT | Performed by: INTERNAL MEDICINE

## 2021-08-31 PROCEDURE — 80197 ASSAY OF TACROLIMUS: CPT | Performed by: INTERNAL MEDICINE

## 2021-08-31 PROCEDURE — 84443 ASSAY THYROID STIM HORMONE: CPT | Performed by: PATHOLOGY

## 2021-08-31 PROCEDURE — 99214 OFFICE O/P EST MOD 30 MIN: CPT | Mod: GC | Performed by: STUDENT IN AN ORGANIZED HEALTH CARE EDUCATION/TRAINING PROGRAM

## 2021-08-31 PROCEDURE — 36415 COLL VENOUS BLD VENIPUNCTURE: CPT | Performed by: PATHOLOGY

## 2021-08-31 PROCEDURE — 80048 BASIC METABOLIC PNL TOTAL CA: CPT | Performed by: PATHOLOGY

## 2021-08-31 ASSESSMENT — PAIN SCALES - GENERAL: PAINLEVEL: NO PAIN (0)

## 2021-08-31 ASSESSMENT — MIFFLIN-ST. JEOR: SCORE: 1613.7

## 2021-08-31 NOTE — LETTER
2021         RE: Everton Larios  [address] [facility] [id number]        Dear Colleague,    Thank you for referring your patient, Everton Larios, to the [facility] [place]. Please see a copy of my visit note below.    IBD CLINIC VISIT, Follow-up from last visit 2019     CC/REFERRING MD:  Elin Jensen  REASON FOR CONSULTATION: Establish care for UC Pancolitis     IBD HISTORY  IBD type: UC pancolitis (patient report)  Age at diagnosis: , colonoscope with biopsy at [facility]  Endoscopic extent of disease: Quiescent disease on last colonoscopy ()  Histologic: Quiescent on last colonoscopy ()  Current IBD medications: none, on sirolimus and tacrolimus for heart transplant  Prior IBD surgeries: None  Prior IBD Medications:  - Steroids (many years ago)  - Balsalazide ( - 2019)  - Asacol mesalamine (2019 - 2020, self stopped)     DRUG MONITORING  TPMT enzyme activity:  N/A   6-TGN/6-MMPN levels: N/A   Biologic concentration: N/A     DISEASE ASSESSMENT  Fecal calprotectin: 951 (2019)  Endoscopy: 2020 showed mucosa crypt distortion with eosinophil in lamina propria, suggested mycophenolate injury.   Enterography: No recent  C diff: not done.     Yadav score: 0 (partial)  Stool freq: 0 (baseline stools frequency)  Rectal bleedin (None)  PGA: 0 (normal)  Endoscopy: Not done  Remission: <3   Mild disease: 3-5  Moderate disease: 6-10  Severe disease: >10     ASSESSMENT/PLAN:  54 year old male with a history of ASD (s/p repair in childhood), NICM 2/p OHT (19) on tacrolimus & sirolimus for IST, ulcerative pancolitis (diagnosed  on asacol), CMV viremia and CDI treated in , Luisito's Esophagus who is here for follow-up management of his UC.     #. Ulcerative pancolitis:  Previoulsy quiescent since 2017 on 5-ASA monotherapy (balsalazide through 2019, then switched to mesalamine until 2020). Flare triggered by C Diff and CMV viremia  in 2019 after heart transplant. Self stopped mesalamine mid 2020 without flare. Likely UC is controlled with heart transplant immunosuppressant. Will obtain fecal calprotectin this visit, if positive will obtain colonoscope, if negative will do colonoscope as scheduled every 2 years for colon cancer surveillance.    #. 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 Following with Dr. Shukla with ID. He was treated with VGCV with improvement in his CMV viral log. Last CMV quant 1,107. No longer follow-up with     #. Elevated alkaline phosphatase: improved  Rising since 6/17/19, now 129. Total bilirubin, AST/ALT remain normal. MRCP negative, less likely having PSC.     #. 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 Due this year, will proceed EGD the same time with colonoscope.     #. Osteoporosis with L1, T12 compression fracture:  Follows with PCP. Last DEXA 2018. On calcium and vitamin D, follows endocrinology is on alendronate.    # Liver mass, likely hemangioma  Found incidentally on MRI 2019 with suggestion to follow-up in 6 months. MRI liver ordered.      RECOMMENDATIONS:  -- Ok to hold mesalamine given no clinical of UC flare  -- Fecal calprotectin this visit, if negative will do colonoscope  2/2022 for colon cancer surveilliance  -- EGD is due, plan to schedule 2/2022 as well  -- MRI liver to follow-up liver nodule  -- Advice switching from senna to milarax for constipation     IBD Health Care Maintenance:  Vaccinations:    -- Influenza (every year): Due for this year  -- TdaP (every 10 years): 7/2012  -- Pneumococcal Pneumonia (once plus booster at 5 years): Pneumovax 9/28/19, PCV 13 9/26/18  -- Yearly assessment for latent Tb (verbal screening and exam, PPD or QuantiFERON-Tb testing): not done     One time confirmation of immunity or  serologies:  -- Hepatitis A (serologies or immunizations):   -- Hepatitis B (serologies or immunizations): Immune, titer 1.15  -- Varicella: immuned 2/2019  -- MMR  -- Meningococcal meningitis (all patients at risk for meningitis)  -- Due to the immunosuppression in this patient, I would not advise administration of live vaccines such as varicella/VZV, intranasal influenza, MMR, or yellow fever vaccine (if travelling).       Bone mineral density screening   -- Recommend all patients supplement with calcium and vitamin D, is taking     Cancer Screening:  Colon cancer screening:   Given >10 years of pancolonic disease, recommend patient undergo regular dysplasia surveillance. Last colonoscope 2/2019.  Skin cancer screening: Annual visual exam of skin by dermatologist since patient is immunocompromised.     Misc:  -- Avoid tobacco use  -- Avoid NSAIDs as there is potentially a 25% chance of causing an IBD flare     Return to clinic in 6 months.  Pt care plan discussed with Dr. Allen, GI staff physician.     Laurence Pak MD  GI Fellow  [phone]    HPI:   54 year old male with a history of ASD (s/p repair in childhood), NICM 2/p OHT (2/24/19) on tacrolimus & sirolimus, ulcerative pancolitis (diagnosed 2005), Luisito's Esophagus who was referred to our clinic to follow-up for his ulcerative colitis as his transplant wants him to have GI f/u.    He was requested to follow-up with GI by cardiolgy transplant team as he lost to follow-up with GI since last visit 11/2019. Doing well apart from being constipated and bloating. Taking stool softener every day OTC. Having once a day bowel movement without blood. No recurrent of C.diff. He is no longer taking mesalamine which he ran out about a year ago for financial issue.     His last colonoscope was done in 2/2020 which showed crypt architecture distortion, increased lamina propria eosinophils, eosinophilic crypt abscesses, and scattered apoptotic cells, suggested  mycophenolate injury. Thus, his immunosuppressant was changed from switched from MMF to sirolimus. He is now on tacrolimus and sirolimus for his heart transplant.    No myalgias, arthralgias, eye redness/pain or mouth sores.    ROS:    No fevers or chills  No weight loss  No blurry vision, double vision or change in vision  No sore throat  No lymphadenopathy  No headache, paraesthesias, or weakness in a limb  No shortness of breath or wheezing  No chest pain or pressure  No arthralgias or myalgias  No rashes or skin changes  No odynophagia or dysphagia  No BRBPR, hematochezia, melena  No dysuria, frequency or urgency  No hot/cold intolerance or polyria  No anxiety or depression    Extra intestinal manifestations of IBD:  No uveitis/episcleritis  No aphthous ulcers   No arthritis   No erythema nodosum/pyoderma gangrenosum.     PERTINENT PAST MEDICAL HISTORY:  Past Medical History:   Diagnosis Date     Alcohol abuse      Arthritis     hands, neck     ASD (atrial septal defect)     s/p repair age 3     Asthma      Atrial fibrillation (H)      Pierce's esophagus 10/4/2018     Pierce's esophagus      Cataract      CHF (congestive heart failure) (H)      Chronic rhinitis 10/4/2018     Chronic systolic heart failure (H) 10/4/2018     Clotting disorder (H)      Congenital cardiomyopathy in  (H)      Depression 10/4/2018     Depression      Diastolic dysfunction      DJD (degenerative joint disease)     neck     DJD (degenerative joint disease) - neck 10/4/2018     H/O congenital atrial septal defect (ASD) repair at age 3 10/4/2018     History of anesthesia complications     nausea     History of transfusion      Hypothyroidism      Hypothyroidism due to medication 10/4/2018     ICD (implantable cardioverter-defibrillator) in place      ICD, [employer] ; gen change 2018 10/4/2018     Intermittent asthma without complication 10/4/2018     Nonischemic cardiomyopathy (H) 10/4/2018     On amiodarone therapy  10/4/2018     Pacemaker      Paroxysmal atrial fibrillation (H) 10/4/2018     S/P ablation of atrial fibrillation 10/4/2018     Systolic heart failure (H)      Ulcerative colitis (H) 2005     Ulcerative colitis (H)      Ventricular tachycardia (H) 10/4/2018     Ventricular tachycardia (H)        PREVIOUS SURGERIES:  Past Surgical History:   Procedure Laterality Date     ABLATION OF DYSRHYTHMIC FOCUS      for A fib     AICD, DUAL CHAMBER       ASD REPAIR  1968     BRONCHOSCOPY (RIGID OR FLEXIBLE), DIAGNOSTIC N/A 4/30/2019    Procedure: BRONCHOSCOPY, WITH BAL;  Surgeon: Margot Chiang MD;  Location:  GI     CARDIAC DEFIBRILLATOR PLACEMENT      x2--last was Feb 2018     COLONOSCOPY N/A 2/20/2020    Procedure: COLONOSCOPY, WITH POLYPECTOMY AND BIOPSY;  Surgeon: Ranjeet Cooper MD;  Location: [facility]     COLONOSCOPY N/A 2/5/2015    Procedure: COLONOSCOPY with biopsy;  Surgeon: Fernie Akers MD;  Location: [facility];  Service:      COLONOSCOPY N/A 12/19/2017    Procedure: COLONOSCOPY with biopsies and polypectomies using snare;  Surgeon: Gael Romero MD;  Location: [facility];  Service:      CV CORONARY ANGIOGRAM N/A 2/12/2020    Procedure: CV CORONARY ANGIOGRAM;  Surgeon: Isiah Mcallister MD;  Location: [facility] CARDIAC CATH LAB     CV CORONARY ANGIOGRAM N/A 3/17/2021    Procedure: CV CORONARY ANGIOGRAM;  Surgeon: Santino Mckeon MD;  Location:  HEART CARDIAC CATH LAB     CV HEART BIOPSY N/A 3/4/2019    Procedure: Heart Biopsy;  Surgeon: Abhijeet Titus MD;  Location:  HEART CARDIAC CATH LAB     CV HEART BIOPSY N/A 3/25/2019    Procedure: Heart Biopsy;  Surgeon: Yves Rodrigues MD;  Location:  HEART CARDIAC CATH LAB     CV HEART BIOPSY N/A 3/28/2019    Procedure: Heart Cath Heart Biopsy;  Surgeon: Yves Rodrigues MD;  Location:  HEART CARDIAC CATH LAB     CV HEART BIOPSY N/A 4/10/2019    Procedure: CV HEART BIOPSY;  Surgeon: Isiah Mcallister MD;  Location:   HEART CARDIAC CATH LAB     CV HEART BIOPSY N/A 4/24/2019    Procedure: CV HEART BIOPSY;  Surgeon: Isiah Mcallister MD;  [facility]     CV HEART BIOPSY N/A 5/8/2019    Procedure: CV HEART BIOPSY;  Surgeon: Isiah Mcallister MD;  Location:  HEART CARDIAC CATH LAB     CV HEART BIOPSY N/A 6/4/2019    Procedure: CV HEART BIOPSY;  Surgeon: Alton Solomon MD;  [facility]     CV HEART BIOPSY N/A 5/22/2019    Procedure: CV HEART BIOPSY;  Surgeon: Yves Rodrigues MD;  Location:  HEART CARDIAC CATH LAB     CV HEART BIOPSY N/A 7/17/2019    Procedure: CV HEART BIOPSY;  Surgeon: Isiah Mcallister MD;  Location:  HEART CARDIAC CATH LAB     CV HEART BIOPSY N/A 8/13/2019    Procedure: CV HEART BIOPSY;  Surgeon: Jackson Patten MD;  Location:  HEART CARDIAC CATH LAB     CV HEART BIOPSY N/A 10/15/2019    Procedure: CV HEART BIOPSY;  Surgeon: Santino Mckeon MD;  Location:  HEART CARDIAC CATH LAB     CV HEART BIOPSY N/A 2/12/2020    Procedure: CV HEART BIOPSY;  Surgeon: Isiah Mcallister MD;  Location:  HEART CARDIAC CATH LAB     CV HEART BIOPSY N/A 5/5/2020    Procedure: CV HEART BIOPSY;  Surgeon: Yves Rodrigues MD;  Location:  HEART CARDIAC CATH LAB     CV HEART BIOPSY N/A 3/17/2021    Procedure: CV HEART BIOPSY;  Surgeon: Santino Mckeon MD;  Location:  HEART CARDIAC CATH LAB     CV INTRA AORTIC BALLOON N/A 2/18/2019    Procedure: Intra-Aortic Balloon;  Surgeon: Alton Solomon MD;  Location:  HEART CARDIAC CATH LAB     CV INTRA AORTIC BALLOON N/A 2/20/2019    Procedure: Replace subclavian IABP;  Surgeon: Yves Rodrigues MD;  Location:  HEART CARDIAC CATH LAB     CV INTRAVASULAR ULTRASOUND N/A 2/12/2020    Procedure: CV INTRAVASCULAR ULTRASOUND;  Surgeon: Isiah Mcallister MD;  Location:  HEART CARDIAC CATH LAB     CV LEFT HEART CATH N/A 3/19/2019    Procedure: Left Heart Cath;  Surgeon: Yves Rodrigues,  MD;  [facility]     CV LEFT HEART CATH N/A 2/12/2020    Procedure: Left Heart Cath;  Surgeon: Isiah Mcallister MD;  [facility]     CV MYOCARDIAL BIOPSY N/A 3/19/2019    Procedure: RHC/HBx - Femoral access for 3/19 per Nimisha GONZALEZ;  Surgeon: Yves Rodrigues MD;  [facility]     CV MYOCARDIAL BIOPSY N/A 3/11/2019    Procedure: ADD ON RHC/HBX;  Surgeon: Alton Solomon MD;  [facility]     CV RIGHT HEART CATH MEASUREMENTS RECORDED N/A 3/25/2019    Procedure: Right Heart Cath;  Surgeon: Yves Rodrigues MD;  Location:  HEART CARDIAC CATH LAB     CV RIGHT HEART CATH MEASUREMENTS RECORDED N/A 3/28/2019    Procedure: Heart Cath Right Heart Cath;  Surgeon: Yves Rodrigues MD;  Location:  HEART CARDIAC CATH LAB     CV RIGHT HEART CATH MEASUREMENTS RECORDED N/A 4/24/2019    Procedure: CV RIGHT HEART CATH;  Surgeon: Isiah Mcallister MD;  Location:  HEART CARDIAC CATH LAB     CV RIGHT HEART CATH MEASUREMENTS RECORDED N/A 6/4/2019    Procedure: CV RIGHT HEART CATH;  Surgeon: Alton Solomon MD;  Location:  HEART CARDIAC CATH LAB     CV RIGHT HEART CATH MEASUREMENTS RECORDED N/A 5/22/2019    Procedure: CV RIGHT HEART CATH;  Surgeon: Yves Rodrigues MD;  Location:  HEART CARDIAC CATH LAB     CV RIGHT HEART CATH MEASUREMENTS RECORDED N/A 8/13/2019    Procedure: CV RIGHT HEART CATH;  Surgeon: Jackson Patten MD;  Location:  HEART CARDIAC CATH LAB     CV RIGHT HEART CATH MEASUREMENTS RECORDED N/A 10/15/2019    Procedure: CV RIGHT HEART CATH;  Surgeon: Santino Mckeon MD;  [facility]     CV RIGHT HEART CATH MEASUREMENTS RECORDED N/A 2/12/2020    Procedure: CV RIGHT HEART CATH;  Surgeon: Isiah Mcallister MD;  Location:  HEART CARDIAC CATH LAB     CV RIGHT HEART CATH MEASUREMENTS RECORDED N/A 3/17/2021    Procedure: CV RIGHT HEART CATH;  Surgeon:  Santino Mckeon MD;  Location:  HEART CARDIAC CATH LAB     ESOPHAGOSCOPY, GASTROSCOPY, DUODENOSCOPY (EGD), COMBINED N/A 12/19/2017    Procedure: ESOPHAGOGASTRODUODENOSCOPY (EGD) with biopsies;  Surgeon: Gael Romero MD;  Location: [facility];  Service:      HAND SURGERY Left      HC REVISE MEDIAN N/CARPAL TUNNEL SURG  9/21/2016    Procedure: OPEN RIGHT CARPAL TUNNEL RELEASE CORTISONE INJECTION RIGHT THUMB;  Surgeon: Duong Santoyo MD;  Location: [facility];  Service: Orthopedics     INCISION AND DRAINAGE CHEST WASHOUT, COMBINED N/A 3/21/2019    Procedure: Incision And Drainage; Evacuation Right Chest Wall Hematoma;  Surgeon: Dewayne House MD;  Location:  OR     INSERT INTRAAORTIC BALLOON PUMP Left 2/19/2019    Procedure: Insert left  Subclavian Balloon Pump,  Removal Right femoral arterial balloom pump sheath;  Surgeon: Dewayne House MD;  [facility]     INSERT INTRAAORTIC BALLOON PUMP Left 2/21/2019    Procedure: SUBCLAVIAN BALLOON PUMP PLACEMENT;  Surgeon: Ben White MD;  Location:  OR     INSERT INTRACORONARY STENT      x2     IR PICC PLACEMENT > 5 YRS OF AGE  5/8/2019     TRANSPLANT HEART RECIPIENT N/A 2/24/2019    Procedure: TRANSPLANT HEART RECIPIENT;  Surgeon: Dewayne House MD;  Location:  OR       PREVIOUS ENDOSCOPY:  Colonoscopy 2017    ALLERGIES:     Allergies   Allergen Reactions     Hydromorphone Other (See Comments)     Significant Delirium     Adhesive Tape Blisters     Tegaderm causes bruises, blisters and extreme irritation.     Lisinopril Other (See Comments)     hypotension     Quinolones Other (See Comments)     Would not rx quinolones until QTc interval improved.      Tobramycin Other (See Comments)     Would not use aminoglycosides as his kidney function is very borderline     Codeine Nausea       PERTINENT MEDICATIONS:    Current Outpatient Medications:      acetaminophen (TYLENOL) 325 MG tablet, Take 2 tablets (650 mg) by mouth  every 4 hours as needed for mild pain, Disp: , Rfl:      albuterol (PROAIR HFA/PROVENTIL HFA/VENTOLIN HFA) 108 (90 Base) MCG/ACT inhaler, Inhale 2 puffs into the lungs every 6 hours as needed for shortness of breath / dyspnea or wheezing, Disp: , Rfl:      alendronate (FOSAMAX) 70 MG tablet, Take 1 tablet (70 mg) by mouth every 7 days Take 60 minutes before am meal with 8 oz. water. Remain upright for 30 minutes., Disp: 12 tablet, Rfl: 4     amLODIPine (NORVASC) 5 MG tablet, Take 2 tablets (10 mg) by mouth daily, Disp: 60 tablet, Rfl: 11     aspirin (ASA) 81 MG chewable tablet, Take 1 tablet (81 mg) by mouth daily, Disp: , Rfl:      calcium carbonate 600 mg-vitamin D 400 units (CALTRATE) 600-400 MG-UNIT per tablet, Take 1 tablet by mouth 2 times daily (with meals), Disp: , Rfl:      cetirizine (ZYRTEC) 10 MG tablet, Take 10 mg by mouth daily, Disp: , Rfl:      DULoxetine (CYMBALTA) 20 MG EC capsule, Take 20 mg by mouth daily, Disp: , Rfl:      fluticasone (FLOVENT HFA) 220 MCG/ACT Inhaler, Inhale 2 puffs into the lungs 2 times daily, Disp: , Rfl:      levothyroxine (SYNTHROID/LEVOTHROID) 75 MCG tablet, Take 1 tablet (75 mcg) by mouth daily, Disp: 90 tablet, Rfl: 3     melatonin 3 MG tablet, Take 2 tablets (6 mg) by mouth At Bedtime (Patient taking differently: Take 5 mg by mouth nightly as needed ), Disp: , Rfl:      multivitamin w/minerals (THERA-VIT-M) tablet, Take 1 tablet by mouth daily, Disp: , Rfl:      omeprazole (PRILOSEC) 40 MG DR capsule, Take 1 capsule (40 mg) by mouth daily, Disp: , Rfl:      polyethylene glycol (MIRALAX/GLYCOLAX) powder, Take 1 capful by mouth daily, Disp: , Rfl:      rosuvastatin (CRESTOR) 10 MG tablet, TAKE 1 TABLET BY MOUTH EVERY DAY, Disp: 90 tablet, Rfl: 3     senna (SENOKOT) 8.6 MG tablet, Take 2 tablets by mouth 2 times daily as needed for constipation, Disp: , Rfl:      sirolimus (GENERIC EQUIVALENT) 0.5 MG tablet, Take 6 tablets (3 mg) by mouth daily., Disp: 64 tablet, Rfl:  3     tacrolimus (GENERIC EQUIVALENT) 0.5 MG capsule, On HOLD taking 3 mg bid., Disp: 90 capsule, Rfl: 11     tacrolimus (GENERIC EQUIVALENT) 1 MG capsule, Take 2 capsules (2 mg) by mouth every morning AND 2 capsules (2 mg) every evening., Disp: 150 capsule, Rfl: 11     ondansetron (ZOFRAN-ODT) 4 MG ODT tab, DISSOLVE 1 TABLET ON THE TONGUE EVERY EIGHT HOURS AS NEEDED (Patient not taking: Reported on 2021), Disp: , Rfl: 3    SOCIAL HISTORY:  Social History     Socioeconomic History     Marital status: Single     Spouse name: Not on file     Number of children: Not on file     Years of education: Not on file     Highest education level: Not on file   Occupational History     Not on file   Tobacco Use     Smoking status: Former Smoker     Packs/day: 1.00     Years: 25.00     Pack years: 25.00     Start date: 1980     Quit date: 2008     Years since quittin.8     Smokeless tobacco: Never Used   Substance and Sexual Activity     Alcohol use: Yes     Alcohol/week: 1.0 - 2.0 standard drinks     Types: 1 - 2 Cans of beer per week     Drug use: No     Sexual activity: Not on file   Other Topics Concern     Not on file   Social History Narrative    Everton is a retired . He lives by himself in a townhouse in [place]. He has no lawn care responsibilities. He will be staying with his folks during his post-hospital early transplant care time. No history of TB exposures.     He works part-time at [employer].     Social Determinants of Health     Financial Resource Strain:      Difficulty of Paying Living Expenses:    Food Insecurity:      Worried About Running Out of Food in the Last Year:      Ran Out of Food in the Last Year:    Transportation Needs:      Lack of Transportation (Medical):      Lack of Transportation (Non-Medical):    Physical Activity:      Days of Exercise per Week:      Minutes of Exercise per Session:    Stress:      Feeling of Stress :    Social Connections:      Frequency of  "Communication with Friends and Family:      Frequency of Social Gatherings with Friends and Family:      Attends Church Services:      Active Member of Clubs or Organizations:      Attends Club or Organization Meetings:      Marital Status:    Intimate Partner Violence:      Fear of Current or Ex-Partner:      Emotionally Abused:      Physically Abused:      Sexually Abused:        FAMILY HISTORY:  Family History   Problem Relation Age of Onset     Endometrial Cancer Mother      Osteoporosis Mother      Hypertension Father      Endometrial Cancer Maternal Grandmother      Hip fracture No family hx of      Nephrolithiasis No family hx of        Past/family/social history reviewed and no changes    PHYSICAL EXAMINATION:  Constitutional: aaox3, cooperative, pleasant, not dyspneic/diaphoretic, no acute distress  Vitals reviewed: BP (!) 137/99 (BP Location: Right arm)   Pulse 103   Ht 1.727 m (5' 8\")   Wt 81.9 kg (180 lb 9.6 oz)   SpO2 98%   BMI 27.46 kg/m    Wt:   Wt Readings from Last 2 Encounters:   08/31/21 81.9 kg (180 lb 9.6 oz)   06/22/21 80.3 kg (177 lb)      Eyes: Sclera anicteric/injected  Ears/nose/mouth/throat: Normal oropharynx without ulcers or exudate, mucus membranes moist, hearing intact  Neck: supple  CV: No edema  Respiratory: Unlabored breathing  Abd: Nondistended, +bs, no hepatosplenomegaly, nontender, no peritoneal signs  Skin: warm, perfused, no jaundice  Psych: Normal affect  MSK: Normal gait    PERTINENT STUDIES:  Most recent CBC:  Recent Labs   Lab Test 08/31/21  1116 04/09/21  0810   WBC 5.7 5.0   HGB 12.3* 11.2*   HCT 37.5* 34.6*    232     Most recent hepatic panel:  Recent Labs   Lab Test 06/22/21  1445 03/17/21  0813   ALT 24 49   AST 20 48*     Most recent creatinine:  Recent Labs   Lab Test 08/31/21  1115 07/27/21  1304   CR 1.48* 1.54*     MRCP 12/2019  1.  Normal biliary system.  2.  A 1.5 cm lesion in segment 7 of the liver which demonstrates atypical imaging " characteristics with diffuse enhancement on the delayed sequence that most likely represents an atypical hemangioma. Recommend a follow-up magnetic resonance imaging   examination of the liver in 6 months to document stability.  3.  Numerous other benign hepatic cysts and THADs.  4.  Splenomegaly.  5.  A 1.2 cm right adrenal adenoma.  6.  A T12 compression fracture.    Last colonoscopy 2/27/2020, Done at [facility]  - hemorrhoid  - granularity in the ascending colon and cecum  - normal terminal ileum    Surgical pathology exam  Order: [id number]  Collected:  2/20/2020 12:41 PM Status:  Final result   Visible to patient:  Yes (MyChart)   0 Result Notes     1 Follow-up Encounter       FINAL DIAGNOSIS:   A. CECUM, BIOPSY:   - Colonic mucosa with crypt architecture distortion, increased lamina   propria eosinophils and scattered   apoptotic cells, see comment   - No evidence of dysplasia     B. COLON BIOPSY, 110CM:   - Colonic mucosa with crypt architecture distortion, increased lamina   propria eosinophils, eosinophilic   cryptitis and scattered apoptotic cells, see comment   - Rare (one) CMV positive cell identified by immunohistochemistry   - No evidence of dysplasia     C. COLON BIOPSY, 100CM:   - Colonic mucosa with crypt architecture distortion, increased lamina   propria eosinophils, eosinophilic crypt   abscesses, and scattered apoptotic cells, see comment   - Rare (one) CMV positive cell identified by immunohistochemistry   - No evidence of dysplasia     D. COLON BIOPSY, 90CM:   - Colonic mucosa with crypt architecture distortion, increased lamina   propria eosinophils and scattered   apoptotic cells, see comment   - No evidence of dysplasia     E. COLON BIOPSY, 80CM:   - Colonic mucosa with crypt architecture distortion, increased lamina   propria eosinophils and scattered   apoptotic cells, see comment   - No evidence of dysplasia     F. COLON BIOPSY, 70CM:   - Colonic mucosa with crypt architecture distortion,  increased lamina   propria eosinophils and scattered   apoptotic cells, see comment   - No evidence of dysplasia     G. COLON BIOPSY, 60CM:   - Colonic mucosa with mild crypt architecture distortion   - No evidence of dysplasia     H. COLON BIOPSY, 50CM:   - Colonic mucosa with crypt architecture distortion, increased lamina   propria eosinophils and scattered   apoptotic cells, see comment   - No evidence of dysplasia     I. COLON BIOPSY, 40CM:   - Colonic mucosa with focal crypt architecture distortion   - No evidence of dysplasia     J. COLON BIOPSY, 30CM:   - Colonic mucosa with no significant histologic abnormality   - No evidence of dysplasia     K. COLON BIOPSY, 20CM:   - Colonic mucosa with no significant histologic abnormality   - No evidence of dysplasia     L. RECTAL BIOPSY:   - Rectal mucosa with no significant histologic abnormality   - No evidence of dysplasia     These features favor MMF (mycophenolate) induced injury over active  inflammatory bowel disease (ulcerative   colitis).  Clinical and endoscopic correlation is required.              Again, thank you for allowing me to participate in the care of your patient.      Sincerely,    Laurence Pak MD

## 2021-08-31 NOTE — PROGRESS NOTES
IBD CLINIC VISIT, Follow-up from last visit 2019     CC/REFERRING MD:  Elin Jensen  REASON FOR CONSULTATION: Establish care for UC Pancolitis     IBD HISTORY  IBD type: UC pancolitis (patient report)  Age at diagnosis: , colonoscope with biopsy at [facility]  Endoscopic extent of disease: Quiescent disease on last colonoscopy ()  Histologic: Quiescent on last colonoscopy ()  Current IBD medications: none, on sirolimus and tacrolimus for heart transplant  Prior IBD surgeries: None  Prior IBD Medications:  - Steroids (many years ago)  - Balsalazide ( - 2019)  - Asacol mesalamine (2019 - 2020, self stopped)     DRUG MONITORING  TPMT enzyme activity:  N/A   6-TGN/6-MMPN levels: N/A   Biologic concentration: N/A     DISEASE ASSESSMENT  Fecal calprotectin: 951 (2019)  Endoscopy: 2020 showed mucosa crypt distortion with eosinophil in lamina propria, suggested mycophenolate injury.   Enterography: No recent  C diff: not done.     Yadav score: 0 (partial)  Stool freq: 0 (baseline stools frequency)  Rectal bleedin (None)  PGA: 0 (normal)  Endoscopy: Not done  Remission: <3   Mild disease: 3-5  Moderate disease: 6-10  Severe disease: >10     ASSESSMENT/PLAN:  54 year old male with a history of ASD (s/p repair in childhood), NICM 2/p OHT (19) on tacrolimus & sirolimus for IST, ulcerative pancolitis (diagnosed  on asacol), CMV viremia and CDI treated in , Luisito's Esophagus who is here for follow-up management of his UC.     #. Ulcerative pancolitis:  Previoulsy quiescent since 2017 on 5-ASA monotherapy (balsalazide through 2019, then switched to mesalamine until 2020). Flare triggered by C Diff and CMV viremia in  after heart transplant. Self stopped mesalamine mid  without flare. Likely UC is controlled with heart transplant immunosuppressant. Will obtain fecal calprotectin this visit, if positive will obtain colonoscope, if negative will do colonoscope as scheduled  every 2 years for colon cancer surveillance.    #. 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 Following with Dr. Shukla with ID. He was treated with VGCV with improvement in his CMV viral log. Last CMV quant 1,107. No longer follow-up with     #. Elevated alkaline phosphatase: improved  Rising since 6/17/19, now 129. Total bilirubin, AST/ALT remain normal. MRCP negative, less likely having PSC.     #. 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 Due this year, will proceed EGD the same time with colonoscope.     #. Osteoporosis with L1, T12 compression fracture:  Follows with PCP. Last DEXA 2018. On calcium and vitamin D, follows endocrinology is on alendronate.    # Liver mass, likely hemangioma  Found incidentally on MRI 2019 with suggestion to follow-up in 6 months. MRI liver ordered.      RECOMMENDATIONS:  -- Ok to hold mesalamine given no clinical of UC flare  -- Fecal calprotectin this visit, if negative will do colonoscope  2/2022 for colon cancer surveilliance  -- EGD is due, plan to schedule 2/2022 as well  -- MRI liver to follow-up liver nodule  -- Advice switching from senna to milarax for constipation     IBD Health Care Maintenance:  Vaccinations:    -- Influenza (every year): Due for this year  -- TdaP (every 10 years): 7/2012  -- Pneumococcal Pneumonia (once plus booster at 5 years): Pneumovax 9/28/19, PCV 13 9/26/18  -- Yearly assessment for latent Tb (verbal screening and exam, PPD or QuantiFERON-Tb testing): not done     One time confirmation of immunity or serologies:  -- Hepatitis A (serologies or immunizations):   -- Hepatitis B (serologies or immunizations): Immune, titer 1.15  -- Varicella: immuned 2/2019  -- MMR  -- Meningococcal meningitis (all patients at risk for meningitis)  -- Due to the immunosuppression in this patient, I  would not advise administration of live vaccines such as varicella/VZV, intranasal influenza, MMR, or yellow fever vaccine (if travelling).       Bone mineral density screening   -- Recommend all patients supplement with calcium and vitamin D, is taking     Cancer Screening:  Colon cancer screening:   Given >10 years of pancolonic disease, recommend patient undergo regular dysplasia surveillance. Last colonoscope 2/2019.  Skin cancer screening: Annual visual exam of skin by dermatologist since patient is immunocompromised.     Misc:  -- Avoid tobacco use  -- Avoid NSAIDs as there is potentially a 25% chance of causing an IBD flare     Return to clinic in 6 months.  Pt care plan discussed with Dr. Allen, GI staff physician.     Laurence Pak MD  GI Fellow  [phone]    HPI:   54 year old male with a history of ASD (s/p repair in childhood), NICM 2/p OHT (2/24/19) on tacrolimus & sirolimus, ulcerative pancolitis (diagnosed 2005), Luisito's Esophagus who was referred to our clinic to follow-up for his ulcerative colitis as his transplant wants him to have GI f/u.    He was requested to follow-up with GI by cardiolgy transplant team as he lost to follow-up with GI since last visit 11/2019. Doing well apart from being constipated and bloating. Taking stool softener every day OTC. Having once a day bowel movement without blood. No recurrent of C.diff. He is no longer taking mesalamine which he ran out about a year ago for financial issue.     His last colonoscope was done in 2/2020 which showed crypt architecture distortion, increased lamina propria eosinophils, eosinophilic crypt abscesses, and scattered apoptotic cells, suggested mycophenolate injury. Thus, his immunosuppressant was changed from switched from MMF to sirolimus. He is now on tacrolimus and sirolimus for his heart transplant.    No myalgias, arthralgias, eye redness/pain or mouth sores.    ROS:    No fevers or chills  No weight loss  No blurry  vision, double vision or change in vision  No sore throat  No lymphadenopathy  No headache, paraesthesias, or weakness in a limb  No shortness of breath or wheezing  No chest pain or pressure  No arthralgias or myalgias  No rashes or skin changes  No odynophagia or dysphagia  No BRBPR, hematochezia, melena  No dysuria, frequency or urgency  No hot/cold intolerance or polyria  No anxiety or depression    Extra intestinal manifestations of IBD:  No uveitis/episcleritis  No aphthous ulcers   No arthritis   No erythema nodosum/pyoderma gangrenosum.     PERTINENT PAST MEDICAL HISTORY:  Past Medical History:   Diagnosis Date     Alcohol abuse      Arthritis     hands, neck     ASD (atrial septal defect)     s/p repair age 3     Asthma      Atrial fibrillation (H)      Pierce's esophagus 10/4/2018     Pierce's esophagus      Cataract      CHF (congestive heart failure) (H)      Chronic rhinitis 10/4/2018     Chronic systolic heart failure (H) 10/4/2018     Clotting disorder (H)      Congenital cardiomyopathy in  (H)      Depression 10/4/2018     Depression      Diastolic dysfunction      DJD (degenerative joint disease)     neck     DJD (degenerative joint disease) - neck 10/4/2018     H/O congenital atrial septal defect (ASD) repair at age 3 10/4/2018     History of anesthesia complications     nausea     History of transfusion      Hypothyroidism      Hypothyroidism due to medication 10/4/2018     ICD (implantable cardioverter-defibrillator) in place      ICD, [employer] ; gen change 2018 10/4/2018     Intermittent asthma without complication 10/4/2018     Nonischemic cardiomyopathy (H) 10/4/2018     On amiodarone therapy 10/4/2018     Pacemaker      Paroxysmal atrial fibrillation (H) 10/4/2018     S/P ablation of atrial fibrillation 10/4/2018     Systolic heart failure (H)      Ulcerative colitis (H)      Ulcerative colitis (H)      Ventricular tachycardia (H) 10/4/2018     Ventricular  tachycardia (H)        PREVIOUS SURGERIES:  Past Surgical History:   Procedure Laterality Date     ABLATION OF DYSRHYTHMIC FOCUS      for A fib     AICD, DUAL CHAMBER       ASD REPAIR  1968     BRONCHOSCOPY (RIGID OR FLEXIBLE), DIAGNOSTIC N/A 4/30/2019    Procedure: BRONCHOSCOPY, WITH BAL;  Surgeon: Margot Chiang MD;  Location:  GI     CARDIAC DEFIBRILLATOR PLACEMENT      x2--last was Feb 2018     COLONOSCOPY N/A 2/20/2020    Procedure: COLONOSCOPY, WITH POLYPECTOMY AND BIOPSY;  Surgeon: Ranjeet Cooper MD;  Location:  GI     COLONOSCOPY N/A 2/5/2015    Procedure: COLONOSCOPY with biopsy;  Surgeon: Fernie Akers MD;  Location: [facility];  Service:      COLONOSCOPY N/A 12/19/2017    Procedure: COLONOSCOPY with biopsies and polypectomies using snare;  Surgeon: Gael Romero MD;  Location: [facility];  Service:      CV CORONARY ANGIOGRAM N/A 2/12/2020    Procedure: CV CORONARY ANGIOGRAM;  Surgeon: Isiah Mcallister MD;  Location: [facility] CARDIAC CATH LAB     CV CORONARY ANGIOGRAM N/A 3/17/2021    Procedure: CV CORONARY ANGIOGRAM;  Surgeon: Santino Mckeon MD;  Location: [facility] CARDIAC CATH LAB     CV HEART BIOPSY N/A 3/4/2019    Procedure: Heart Biopsy;  Surgeon: Abhijeet Titus MD;  Location: [facility] CARDIAC CATH LAB     CV HEART BIOPSY N/A 3/25/2019    Procedure: Heart Biopsy;  Surgeon: Yves Rodrigues MD;  Location:  HEART CARDIAC CATH LAB     CV HEART BIOPSY N/A 3/28/2019    Procedure: Heart Cath Heart Biopsy;  Surgeon: Yves Rodrigues MD;  Location:  HEART CARDIAC CATH LAB     CV HEART BIOPSY N/A 4/10/2019    Procedure: CV HEART BIOPSY;  Surgeon: Isiah Mcallister MD;  Location: [facility] CARDIAC CATH LAB     CV HEART BIOPSY N/A 4/24/2019    Procedure: CV HEART BIOPSY;  Surgeon: Isiah Mcallister MD;  Location: [facility] CARDIAC CATH LAB     CV HEART BIOPSY N/A 5/8/2019    Procedure: CV HEART BIOPSY;  Surgeon: Isiah Mcallister MD;  Location: [facility]  CARDIAC CATH LAB     CV HEART BIOPSY N/A 6/4/2019    Procedure: CV HEART BIOPSY;  Surgeon: Alton Solomon MD;  Location:  HEART CARDIAC CATH LAB     CV HEART BIOPSY N/A 5/22/2019    Procedure: CV HEART BIOPSY;  Surgeon: Yves Rodrigues MD;  Location:  HEART CARDIAC CATH LAB     CV HEART BIOPSY N/A 7/17/2019    Procedure: CV HEART BIOPSY;  Surgeon: Isiah Mcallister MD;  Location:  HEART CARDIAC CATH LAB     CV HEART BIOPSY N/A 8/13/2019    Procedure: CV HEART BIOPSY;  Surgeon: Jackson Patten MD;  Location:  HEART CARDIAC CATH LAB     CV HEART BIOPSY N/A 10/15/2019    Procedure: CV HEART BIOPSY;  Surgeon: Santino Mckeon MD;  Location:  HEART CARDIAC CATH LAB     CV HEART BIOPSY N/A 2/12/2020    Procedure: CV HEART BIOPSY;  Surgeon: Isiah Mcallister MD;  Location:  HEART CARDIAC CATH LAB     CV HEART BIOPSY N/A 5/5/2020    Procedure: CV HEART BIOPSY;  Surgeon: Yves Rodrigues MD;  Location:  HEART CARDIAC CATH LAB     CV HEART BIOPSY N/A 3/17/2021    Procedure: CV HEART BIOPSY;  Surgeon: Santino Mckeon MD;  Location: [facility] CARDIAC CATH LAB     CV INTRA AORTIC BALLOON N/A 2/18/2019    Procedure: Intra-Aortic Balloon;  Surgeon: Alton Solomon MD;  Location:  HEART CARDIAC CATH LAB     CV INTRA AORTIC BALLOON N/A 2/20/2019    Procedure: Replace subclavian IABP;  Surgeon: Yves Rodrigues MD;  Location:  HEART CARDIAC CATH LAB     CV INTRAVASULAR ULTRASOUND N/A 2/12/2020    Procedure: CV INTRAVASCULAR ULTRASOUND;  Surgeon: Isiah Mcallister MD;  Location:  HEART CARDIAC CATH LAB     CV LEFT HEART CATH N/A 3/19/2019    Procedure: Left Heart Cath;  Surgeon: Yves Rodrigues MD;  Location:  HEART CARDIAC CATH LAB     CV LEFT HEART CATH N/A 2/12/2020    Procedure: Left Heart Cath;  Surgeon: Isiah Mcallister MD;  Location:  HEART CARDIAC CATH LAB     CV MYOCARDIAL BIOPSY N/A 3/19/2019    Procedure: RHC/HBx - Femoral access for 3/19 per Nimisha  MD;  Surgeon: Yves Rodrigues MD;  Location:  HEART CARDIAC CATH LAB     CV MYOCARDIAL BIOPSY N/A 3/11/2019    Procedure: ADD ON RHC/HBX;  Surgeon: Alton Solomon MD;  Location:  HEART CARDIAC CATH LAB     CV RIGHT HEART CATH MEASUREMENTS RECORDED N/A 3/25/2019    Procedure: Right Heart Cath;  Surgeon: Yves Rodrigues MD;  Location:  HEART CARDIAC CATH LAB     CV RIGHT HEART CATH MEASUREMENTS RECORDED N/A 3/28/2019    Procedure: Heart Cath Right Heart Cath;  Surgeon: Yves Rodrigues MD;  Location:  HEART CARDIAC CATH LAB     CV RIGHT HEART CATH MEASUREMENTS RECORDED N/A 4/24/2019    Procedure: CV RIGHT HEART CATH;  Surgeon: Isiah Mcallister MD;  Location:  HEART CARDIAC CATH LAB     CV RIGHT HEART CATH MEASUREMENTS RECORDED N/A 6/4/2019    Procedure: CV RIGHT HEART CATH;  Surgeon: Alton Solomon MD;  Location:  HEART CARDIAC CATH LAB     CV RIGHT HEART CATH MEASUREMENTS RECORDED N/A 5/22/2019    Procedure: CV RIGHT HEART CATH;  Surgeon: Yves Rodrigues MD;  Location:  HEART CARDIAC CATH LAB     CV RIGHT HEART CATH MEASUREMENTS RECORDED N/A 8/13/2019    Procedure: CV RIGHT HEART CATH;  Surgeon: Jackson Patten MD;  Location:  HEART CARDIAC CATH LAB     CV RIGHT HEART CATH MEASUREMENTS RECORDED N/A 10/15/2019    Procedure: CV RIGHT HEART CATH;  Surgeon: Santino Mckeon MD;  Location:  HEART CARDIAC CATH LAB     CV RIGHT HEART CATH MEASUREMENTS RECORDED N/A 2/12/2020    Procedure: CV RIGHT HEART CATH;  Surgeon: Isiah Mcallister MD;  Location:  HEART CARDIAC CATH LAB     CV RIGHT HEART CATH MEASUREMENTS RECORDED N/A 3/17/2021    Procedure: CV RIGHT HEART CATH;  Surgeon: Santino Mckeon MD;  Location:  HEART CARDIAC CATH LAB     ESOPHAGOSCOPY, GASTROSCOPY, DUODENOSCOPY (EGD), COMBINED N/A 12/19/2017    Procedure: ESOPHAGOGASTRODUODENOSCOPY (EGD) with biopsies;  Surgeon: Gael Romero MD;  Location: [facility];  Service:      HAND  SURGERY Left      HC REVISE MEDIAN N/CARPAL TUNNEL SURG  9/21/2016    Procedure: OPEN RIGHT CARPAL TUNNEL RELEASE CORTISONE INJECTION RIGHT THUMB;  Surgeon: Duong Santoyo MD;  Location: [facility] Main OR;  Service: Orthopedics     INCISION AND DRAINAGE CHEST WASHOUT, COMBINED N/A 3/21/2019    Procedure: Incision And Drainage; Evacuation Right Chest Wall Hematoma;  Surgeon: Dewayne House MD;  Location: [facility] OR     INSERT INTRAAORTIC BALLOON PUMP Left 2/19/2019    Procedure: Insert left  Subclavian Balloon Pump,  Removal Right femoral arterial balloom pump sheath;  Surgeon: Dewayne House MD;  Location: [facility] OR     INSERT INTRAAORTIC BALLOON PUMP Left 2/21/2019    Procedure: SUBCLAVIAN BALLOON PUMP PLACEMENT;  Surgeon: Ben White MD;  Location: [facility] OR     INSERT INTRACORONARY STENT      x2     IR PICC PLACEMENT > 5 YRS OF AGE  5/8/2019     TRANSPLANT HEART RECIPIENT N/A 2/24/2019    Procedure: TRANSPLANT HEART RECIPIENT;  Surgeon: Dewayne House MD;  Location: [facility] OR       PREVIOUS ENDOSCOPY:  Colonoscopy 2017    ALLERGIES:     Allergies   Allergen Reactions     Hydromorphone Other (See Comments)     Significant Delirium     Adhesive Tape Blisters     Tegaderm causes bruises, blisters and extreme irritation.     Lisinopril Other (See Comments)     hypotension     Quinolones Other (See Comments)     Would not rx quinolones until QTc interval improved.      Tobramycin Other (See Comments)     Would not use aminoglycosides as his kidney function is very borderline     Codeine Nausea       PERTINENT MEDICATIONS:    Current Outpatient Medications:      acetaminophen (TYLENOL) 325 MG tablet, Take 2 tablets (650 mg) by mouth every 4 hours as needed for mild pain, Disp: , Rfl:      albuterol (PROAIR HFA/PROVENTIL HFA/VENTOLIN HFA) 108 (90 Base) MCG/ACT inhaler, Inhale 2 puffs into the lungs every 6 hours as needed for shortness of breath / dyspnea or wheezing, Disp: , Rfl:      alendronate (FOSAMAX)  70 MG tablet, Take 1 tablet (70 mg) by mouth every 7 days Take 60 minutes before am meal with 8 oz. water. Remain upright for 30 minutes., Disp: 12 tablet, Rfl: 4     amLODIPine (NORVASC) 5 MG tablet, Take 2 tablets (10 mg) by mouth daily, Disp: 60 tablet, Rfl: 11     aspirin (ASA) 81 MG chewable tablet, Take 1 tablet (81 mg) by mouth daily, Disp: , Rfl:      calcium carbonate 600 mg-vitamin D 400 units (CALTRATE) 600-400 MG-UNIT per tablet, Take 1 tablet by mouth 2 times daily (with meals), Disp: , Rfl:      cetirizine (ZYRTEC) 10 MG tablet, Take 10 mg by mouth daily, Disp: , Rfl:      DULoxetine (CYMBALTA) 20 MG EC capsule, Take 20 mg by mouth daily, Disp: , Rfl:      fluticasone (FLOVENT HFA) 220 MCG/ACT Inhaler, Inhale 2 puffs into the lungs 2 times daily, Disp: , Rfl:      levothyroxine (SYNTHROID/LEVOTHROID) 75 MCG tablet, Take 1 tablet (75 mcg) by mouth daily, Disp: 90 tablet, Rfl: 3     melatonin 3 MG tablet, Take 2 tablets (6 mg) by mouth At Bedtime (Patient taking differently: Take 5 mg by mouth nightly as needed ), Disp: , Rfl:      multivitamin w/minerals (THERA-VIT-M) tablet, Take 1 tablet by mouth daily, Disp: , Rfl:      omeprazole (PRILOSEC) 40 MG DR capsule, Take 1 capsule (40 mg) by mouth daily, Disp: , Rfl:      polyethylene glycol (MIRALAX/GLYCOLAX) powder, Take 1 capful by mouth daily, Disp: , Rfl:      rosuvastatin (CRESTOR) 10 MG tablet, TAKE 1 TABLET BY MOUTH EVERY DAY, Disp: 90 tablet, Rfl: 3     senna (SENOKOT) 8.6 MG tablet, Take 2 tablets by mouth 2 times daily as needed for constipation, Disp: , Rfl:      sirolimus (GENERIC EQUIVALENT) 0.5 MG tablet, Take 6 tablets (3 mg) by mouth daily., Disp: 64 tablet, Rfl: 3     tacrolimus (GENERIC EQUIVALENT) 0.5 MG capsule, On HOLD taking 3 mg bid., Disp: 90 capsule, Rfl: 11     tacrolimus (GENERIC EQUIVALENT) 1 MG capsule, Take 2 capsules (2 mg) by mouth every morning AND 2 capsules (2 mg) every evening., Disp: 150 capsule, Rfl: 11     ondansetron  (ZOFRAN-ODT) 4 MG ODT tab, DISSOLVE 1 TABLET ON THE TONGUE EVERY EIGHT HOURS AS NEEDED (Patient not taking: Reported on 2021), Disp: , Rfl: 3    SOCIAL HISTORY:  Social History     Socioeconomic History     Marital status: Single     Spouse name: Not on file     Number of children: Not on file     Years of education: Not on file     Highest education level: Not on file   Occupational History     Not on file   Tobacco Use     Smoking status: Former Smoker     Packs/day: 1.00     Years: 25.00     Pack years: 25.00     Start date: 1980     Quit date: 2008     Years since quittin.8     Smokeless tobacco: Never Used   Substance and Sexual Activity     Alcohol use: Yes     Alcohol/week: 1.0 - 2.0 standard drinks     Types: 1 - 2 Cans of beer per week     Drug use: No     Sexual activity: Not on file   Other Topics Concern     Not on file   Social History Narrative    Everton is a retired . He lives by himself in a townhouse in [place]. He has no lawn care responsibilities. He will be staying with his folks during his post-hospital early transplant care time. No history of TB exposures.     He works part-time at [employer].     Social Determinants of Health     Financial Resource Strain:      Difficulty of Paying Living Expenses:    Food Insecurity:      Worried About Running Out of Food in the Last Year:      Ran Out of Food in the Last Year:    Transportation Needs:      Lack of Transportation (Medical):      Lack of Transportation (Non-Medical):    Physical Activity:      Days of Exercise per Week:      Minutes of Exercise per Session:    Stress:      Feeling of Stress :    Social Connections:      Frequency of Communication with Friends and Family:      Frequency of Social Gatherings with Friends and Family:      Attends Worship Services:      Active Member of Clubs or Organizations:      Attends Club or Organization Meetings:      Marital Status:    Intimate Partner Violence:      Fear  "of Current or Ex-Partner:      Emotionally Abused:      Physically Abused:      Sexually Abused:        FAMILY HISTORY:  Family History   Problem Relation Age of Onset     Endometrial Cancer Mother      Osteoporosis Mother      Hypertension Father      Endometrial Cancer Maternal Grandmother      Hip fracture No family hx of      Nephrolithiasis No family hx of        Past/family/social history reviewed and no changes    PHYSICAL EXAMINATION:  Constitutional: aaox3, cooperative, pleasant, not dyspneic/diaphoretic, no acute distress  Vitals reviewed: BP (!) 137/99 (BP Location: Right arm)   Pulse 103   Ht 1.727 m (5' 8\")   Wt 81.9 kg (180 lb 9.6 oz)   SpO2 98%   BMI 27.46 kg/m    Wt:   Wt Readings from Last 2 Encounters:   08/31/21 81.9 kg (180 lb 9.6 oz)   06/22/21 80.3 kg (177 lb)      Eyes: Sclera anicteric/injected  Ears/nose/mouth/throat: Normal oropharynx without ulcers or exudate, mucus membranes moist, hearing intact  Neck: supple  CV: No edema  Respiratory: Unlabored breathing  Abd: Nondistended, +bs, no hepatosplenomegaly, nontender, no peritoneal signs  Skin: warm, perfused, no jaundice  Psych: Normal affect  MSK: Normal gait    PERTINENT STUDIES:  Most recent CBC:  Recent Labs   Lab Test 08/31/21  1116 04/09/21  0810   WBC 5.7 5.0   HGB 12.3* 11.2*   HCT 37.5* 34.6*    232     Most recent hepatic panel:  Recent Labs   Lab Test 06/22/21  1445 03/17/21  0813   ALT 24 49   AST 20 48*     Most recent creatinine:  Recent Labs   Lab Test 08/31/21  1115 07/27/21  1304   CR 1.48* 1.54*     MRCP 12/2019  1.  Normal biliary system.  2.  A 1.5 cm lesion in segment 7 of the liver which demonstrates atypical imaging characteristics with diffuse enhancement on the delayed sequence that most likely represents an atypical hemangioma. Recommend a follow-up magnetic resonance imaging   examination of the liver in 6 months to document stability.  3.  Numerous other benign hepatic cysts and THADs.  4.  " Splenomegaly.  5.  A 1.2 cm right adrenal adenoma.  6.  A T12 compression fracture.    Last colonoscopy 2/27/2020, [facility]  - hemorrhoid  - granularity in the ascending colon and cecum  - normal terminal ileum    Surgical pathology exam  Order: [id number]  Collected:  2/20/2020 12:41 PM Status:  Final result   Visible to patient:  Yes (MyChart)   0 Result Notes     1 Follow-up Encounter       FINAL DIAGNOSIS:   A. CECUM, BIOPSY:   - Colonic mucosa with crypt architecture distortion, increased lamina   propria eosinophils and scattered   apoptotic cells, see comment   - No evidence of dysplasia     B. COLON BIOPSY, 110CM:   - Colonic mucosa with crypt architecture distortion, increased lamina   propria eosinophils, eosinophilic   cryptitis and scattered apoptotic cells, see comment   - Rare (one) CMV positive cell identified by immunohistochemistry   - No evidence of dysplasia     C. COLON BIOPSY, 100CM:   - Colonic mucosa with crypt architecture distortion, increased lamina   propria eosinophils, eosinophilic crypt   abscesses, and scattered apoptotic cells, see comment   - Rare (one) CMV positive cell identified by immunohistochemistry   - No evidence of dysplasia     D. COLON BIOPSY, 90CM:   - Colonic mucosa with crypt architecture distortion, increased lamina   propria eosinophils and scattered   apoptotic cells, see comment   - No evidence of dysplasia     E. COLON BIOPSY, 80CM:   - Colonic mucosa with crypt architecture distortion, increased lamina   propria eosinophils and scattered   apoptotic cells, see comment   - No evidence of dysplasia     F. COLON BIOPSY, 70CM:   - Colonic mucosa with crypt architecture distortion, increased lamina   propria eosinophils and scattered   apoptotic cells, see comment   - No evidence of dysplasia     G. COLON BIOPSY, 60CM:   - Colonic mucosa with mild crypt architecture distortion   - No evidence of dysplasia     H. COLON BIOPSY, 50CM:   - Colonic mucosa with crypt  architecture distortion, increased lamina   propria eosinophils and scattered   apoptotic cells, see comment   - No evidence of dysplasia     I. COLON BIOPSY, 40CM:   - Colonic mucosa with focal crypt architecture distortion   - No evidence of dysplasia     J. COLON BIOPSY, 30CM:   - Colonic mucosa with no significant histologic abnormality   - No evidence of dysplasia     K. COLON BIOPSY, 20CM:   - Colonic mucosa with no significant histologic abnormality   - No evidence of dysplasia     L. RECTAL BIOPSY:   - Rectal mucosa with no significant histologic abnormality   - No evidence of dysplasia     These features favor MMF (mycophenolate) induced injury over active  inflammatory bowel disease (ulcerative   colitis).  Clinical and endoscopic correlation is required.

## 2021-08-31 NOTE — PATIENT INSTRUCTIONS
- ok to hold mesalamine for now  - would recommend miralax, instead of senna  - will check fecal calprotectin  Today if high inflammation, will do earlier colonoscopy. If negative will plan for colonoscope 2/2022.

## 2021-08-31 NOTE — NURSING NOTE
"Chief Complaint   Patient presents with     Consult     New IBD.       Vitals:    08/31/21 1304   BP: (!) 137/99   BP Location: Right arm   Pulse: 103   SpO2: 98%   Weight: 81.9 kg (180 lb 9.6 oz)   Height: 1.727 m (5' 8\")       Body mass index is 27.46 kg/m .                          Ladi Bro, EMT    "

## 2021-09-01 ENCOUNTER — TELEPHONE (OUTPATIENT)
Dept: GASTROENTEROLOGY | Facility: CLINIC | Age: 56
End: 2021-09-01

## 2021-09-01 DIAGNOSIS — Z94.1 TRANSPLANTED HEART (H): ICD-10-CM

## 2021-09-01 RX ORDER — SIROLIMUS 0.5 MG/1
4 TABLET, FILM COATED ORAL DAILY
Qty: 720 TABLET | Refills: 3 | Status: SHIPPED | OUTPATIENT
Start: 2021-09-01 | End: 2021-12-01

## 2021-09-01 NOTE — RESULT ENCOUNTER NOTE
Rapa levl 3.9. Goal 6-8. Confirmed 24 hour trough. Current dose 3 mg daily. Increase to 4 mg daily. Repeat in 1 week.     Tacro level 5.6. Goal 3-5. Confirmed 12 hour trough. Current dose 2 mg twice a day. No changes.

## 2021-09-01 NOTE — TELEPHONE ENCOUNTER
Called pt to schedule 6 month follow up, mentioned he already revied everything with provider and hung up.

## 2021-09-05 ENCOUNTER — HEALTH MAINTENANCE LETTER (OUTPATIENT)
Age: 56
End: 2021-09-05

## 2021-09-08 DIAGNOSIS — I10 BENIGN ESSENTIAL HYPERTENSION: ICD-10-CM

## 2021-09-10 RX ORDER — AMLODIPINE BESYLATE 5 MG/1
TABLET ORAL
Qty: 60 TABLET | Refills: 11 | Status: SHIPPED | OUTPATIENT
Start: 2021-09-10 | End: 2022-09-13

## 2021-09-22 ENCOUNTER — VIRTUAL VISIT (OUTPATIENT)
Dept: CARDIOLOGY | Facility: CLINIC | Age: 56
End: 2021-09-22
Attending: INTERNAL MEDICINE
Payer: COMMERCIAL

## 2021-09-22 DIAGNOSIS — Z94.1 TRANSPLANTED HEART (H): Primary | ICD-10-CM

## 2021-09-22 PROCEDURE — 99213 OFFICE O/P EST LOW 20 MIN: CPT | Mod: GT | Performed by: NURSE PRACTITIONER

## 2021-09-22 NOTE — PROGRESS NOTES
[facility] -- Telephone Visit  September 22, 2021    HPI:   Mr. Everton Larios is 56yr old male with a history of ASD (s/p repair in childhood), AFib/AF (s/p ablation), NICM, diastolic dysfunction, alcohol abuse, Pierce's esophagus, ulcerative colitis, GIB (s/p splenic embolization), and hypothyroidism, now s/p OHT and bypass of persistent left SVC to right atrial appendage 2/24/19, who is being evaluated via telephone for routine surveillance.     His postoperative course was c/b shock due to RV dysfunction requiring IABP support, small pneumoperitoneum (clinically insignificant), chest wall hematoma (former ICD site, s/p evacuation and drain placement 3/31/19), HCAP/klebsiella pneumonia, and FRANKLIN (required short-term HD, now recovered).  He was transferred to  rehab 4/3, and ultimately discharged to his local residence 4/15.     He was rehospitalized 4/28-5/9/19 with fevers, URI symptoms, abdominal pain, and diarrhea, and was found to be neutropenic with pseudomonas bacteremia and HCAP.  Chest CT showed diffuse bilateral solid with peripheral groundglass pulmonary nodules/opacities and mediastinal adenopathy, BAL grew pseudomonas, and fungitell was positive.  Transplant ID was consulted, and he was treated with 4 weeks of IV cefepime and micafungin.  Repeat CT 5/22 showed significantly decreased bilateral nodular and consolidative opacities.     He represented to [facility] 5/24/19 with FRANKLIN (creatinine 3.43), thought to be secondary to poor intake and self-adjusting diuretics.  He was given 2L NS and 1L LR, with creatinine trending down upon discharge 5/26.     Rejection history:  His biopsy 3/25/19 showed mild AMR1 with some staining for c4d.  Repeat biopsy 3/28 showed improved AMR and less reactive capillary staining, and subsequent biopsies have now shown resolution of AMR and improved capillary staining.  AlloMap scores:  < 35 recently  DSAs:  no  Coronary angio/Ischemic eval:  Coronary angiogram  3/2021 showed normal coronary arteries with no angiographic evidence of obstruction.  Last RHC:  3/2021 showed elevated biventricular filling pressures, with RA 17, mPA 24, PCW 16, and CI 2.6.  Echo/cMRI:  TTE 8/31/21 showed stable graft function, with LVEF 55-60% and normal RV size/function.  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    Since his last visit, he states that he has been doing well.  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  During the week, he remains active by cutting his parent's lawn maintenance, and denies exertional concerns.  He walks some, maybe not as much as he would like.  His breathing has been good, and he denies SOB.  He is amazed at what he is able to do, without any SOB.  He is sleeping well, in a bed with 1 pillow, without PND/orthopnea.  His appetite has been good, and he is eating regularly without nausea, vomiting, and diarrhea.  He has been constipated, and his gastroenterologist would like for him to stay away from [facility] and try to submit a stool sample for review.  He is not weighing himself regularly, but states that his weight is up slightly.  He believes that he was up to 178# at his last clinic visit, 171# on his home scale when last checked.  He denies persistent fluid retention, but notes that he can have some ankle edema in the evenings which always resolve by morning.  He is not checking BPs regularly.  He will get headaches and neck stiffness if he does not wear his oral brace.  He gets coughs related to allergies.  He otherwise denies chest pain, palpitations, dizziness, falls, new headaches, acute vision changes, fevers, chills, sore throat, and signs of bleeding.      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 difficile diarrhea 09/23/2019     Priority: Medium     Transplanted heart (H) 05/30/2019     Priority: Medium     Added automatically from request for surgery [id number]       Central line clotted (H) 05/24/2019     Priority: Medium     FRANKLIN (acute kidney injury) (H) 05/24/2019     Priority: Medium     Sepsis due to Pseudomonas aeruginosa (H) 04/30/2019     Priority: Medium     Pulmonary nodules 04/30/2019     Priority: Medium     Compression fracture of thoracic vertebra (H) 04/30/2019     Priority: Medium     Abnormal white blood cell (WBC) count 04/26/2019     Priority: Medium     Debility 04/03/2019     Priority: Medium     Heart replaced by transplant (H) 02/24/2019     Priority: Medium     Donor CMV positive/ Recipient CMV positive.   Donor EBV positive/ Recipient EBV positive.     Donor NOT PHS higher risk          Iron deficiency anemia 10/18/2018     Priority: Medium     H/O congenital atrial septal defect (ASD) repair at age 3 10/04/2018     Priority: Medium     Ulcerative colitis (H) 10/04/2018     Priority: Medium     Hypothyroidism due to medication 10/04/2018     Priority: Medium     DJD (degenerative joint disease) - neck 10/04/2018     Priority: Medium     Pierce's esophagus 10/04/2018     Priority: Medium     Intermittent asthma without complication 10/04/2018     Priority: Medium     Chronic rhinitis 10/04/2018     Priority: Medium     Depression 10/04/2018      Priority: Medium       PAST MEDICAL HISTORY:  Past Medical History:   Diagnosis Date     Alcohol abuse      Arthritis     hands, neck     ASD (atrial septal defect)     s/p repair age 3     Asthma      Atrial fibrillation (H)      Pierce's esophagus 10/4/2018     Pierce's esophagus      Cataract      CHF (congestive heart failure) (H)      Chronic rhinitis 10/4/2018     Chronic systolic heart failure (H) 10/4/2018     Clotting disorder (H)      Congenital cardiomyopathy in  (H)      Depression 10/4/2018     Depression      Diastolic dysfunction      DJD (degenerative joint disease)     neck     DJD (degenerative joint disease) - neck 10/4/2018     H/O congenital atrial septal defect (ASD) repair at age 3 10/4/2018     History of anesthesia complications     nausea     History of transfusion      Hypothyroidism      Hypothyroidism due to medication 10/4/2018     ICD (implantable cardioverter-defibrillator) in place      ICD, [employer] ; gen change 2018 10/4/2018     Intermittent asthma without complication 10/4/2018     Nonischemic cardiomyopathy (H) 10/4/2018     On amiodarone therapy 10/4/2018     Pacemaker      Paroxysmal atrial fibrillation (H) 10/4/2018     S/P ablation of atrial fibrillation 10/4/2018     Systolic heart failure (H)      Ulcerative colitis (H)      Ulcerative colitis (H)      Ventricular tachycardia (H) 10/4/2018     Ventricular tachycardia (H)        CURRENT MEDICATIONS:  Prescription Medications as of 2021       Rx Number Disp Refills Start End Last Dispensed Date Next Fill Date Owning Pharmacy    acetaminophen (TYLENOL) 325 MG tablet    2019        Sig: Take 2 tablets (650 mg) by mouth every 4 hours as needed for mild pain    Class: Transitional    Route: Oral    albuterol (PROAIR HFA/PROVENTIL HFA/VENTOLIN HFA) 108 (90 Base) MCG/ACT inhaler    10/4/2018        Sig: Inhale 2 puffs into the lungs every 6 hours as needed for shortness of breath / dyspnea or  wheezing    Class: Historical    Route: Inhalation    alendronate (FOSAMAX) 70 MG tablet  12 tablet 4 11/24/2020    [id number] [place] [address]    Sig: Take 1 tablet (70 mg) by mouth every 7 days Take 60 minutes before am meal with 8 oz. water. Remain upright for 30 minutes.    Class: E-Prescribe    Route: Oral    amLODIPine (NORVASC) 5 MG tablet  60 tablet 11 9/10/2021    [id number] [place] [address]    Sig: TAKE 2 TABLETS BY MOUTH EVERY DAY    Class: E-Prescribe    aspirin (ASA) 81 MG chewable tablet    4/2/2019        Sig: Take 1 tablet (81 mg) by mouth daily    Class: Transitional    Route: Oral    calcium carbonate 600 mg-vitamin D 400 units (CALTRATE) 600-400 MG-UNIT per tablet    4/1/2019        Sig: Take 1 tablet by mouth 2 times daily (with meals)    Class: Transitional    Route: Oral    cetirizine (ZYRTEC) 10 MG tablet        [id number] [place] [address]    Sig: Take 10 mg by mouth daily    Class: Historical    Route: Oral    DULoxetine (CYMBALTA) 20 MG EC capsule            Sig: Take 20 mg by mouth daily    Class: Historical    Route: Oral    fluticasone (FLOVENT HFA) 220 MCG/ACT Inhaler            Sig: Inhale 2 puffs into the lungs 2 times daily    Class: Historical    Route: Inhalation    levothyroxine (SYNTHROID/LEVOTHROID) 75 MCG tablet  90 tablet 3 7/28/2021    [id number] [place] [address]    Sig: Take 1 tablet (75 mcg) by mouth daily    Class: E-Prescribe    Notes to Pharmacy: Dose reduction from 88 mcg.    Route: Oral    melatonin 3 MG tablet    4/1/2019        Sig: Take 2 tablets (6 mg) by mouth At Bedtime    Class: Transitional    Route: Oral    multivitamin w/minerals (THERA-VIT-M) tablet    4/12/2019        Sig: Take 1 tablet by mouth daily    Class: OTC    Route: Oral    omeprazole (PRILOSEC) 40 MG DR capsule    4/26/2019    [id number] [place] [address]    Sig: Take 1 capsule (40 mg) by mouth daily    Class: Historical    Route: Oral    ondansetron (ZOFRAN-ODT) 4 MG ODT tab   3 11/7/2018    [id number] [place] [address]    Sig: DISSOLVE 1 TABLET ON THE TONGUE EVERY EIGHT HOURS AS NEEDED    Class: Historical    polyethylene glycol (MIRALAX/GLYCOLAX) powder            Sig: Take 1 capful by mouth daily    Class: Historical    Route: Oral    rosuvastatin (CRESTOR) 10 MG tablet  90 tablet 3 5/17/2021    [id number] [place] [address]    Sig: TAKE 1 TABLET BY MOUTH EVERY DAY    Class: E-Prescribe    Notes to Pharmacy: 90 day supply if able    senna (SENOKOT) 8.6 MG tablet            Sig: Take 2 tablets by mouth 2 times daily as needed for constipation    Class: Historical    Route: Oral    sirolimus (GENERIC EQUIVALENT) 0.5 MG tablet  720 tablet 3 9/1/2021    [id number] [place] [address]    Sig: Take 8 tablets (4 mg) by mouth daily    Class: E-Prescribe    Notes to Pharmacy: TXP DT 2/24/2019 (Heart) TXP Dischg DT 4/3/2019 DX Heart replaced by transplant Z94.1 [facility] ([place], [place])    Route: Oral    tacrolimus (GENERIC EQUIVALENT) 0.5 MG capsule  90 capsule 11 9/18/2020    [id number] [place] [address]    Sig: On HOLD taking 3 mg bid.    Class: E-Prescribe    Notes to Pharmacy: TXP DT 2/24/2019 (Heart) TXP Dischg DT 4/3/2019 DX Heart replaced by transplant Z94.1 [place] [facility] ([place], [place])    tacrolimus (GENERIC EQUIVALENT) 1 MG capsule  150 capsule 11 3/17/2021        Sig: Take 2 capsules (2 mg) by mouth every morning AND 2 capsules (2 mg) every evening.    Class: No Print Out    Notes to Pharmacy: TXP DT 2/24/2019 (Heart) TXP Dischg DT 4/3/2019 DX Heart replaced by transplant Z94.1 [facility]  ([place], [place])    Route: Oral          ROS:   As per HPI.    Exam:  Deferred/Telephone.      Labs:  CBC RESULTS:   Lab Results   Component Value Date    WBC 5.7 08/31/2021    WBC 5.0 04/09/2021    RBC 5.02 08/31/2021    RBC 4.61 04/09/2021    HGB 12.3 (L) 08/31/2021    HGB 11.2 (A) 04/09/2021    HCT 37.5 (L) 08/31/2021    HCT 34.6 04/09/2021    MCV 75 (L) 08/31/2021    MCV 75 04/09/2021    MCH 24.5 (L) 08/31/2021    MCH 24.3 04/09/2021    MCHC 32.8 08/31/2021    MCHC 32.4 04/09/2021    RDW 15.9 (H) 08/31/2021    RDW 14.9 04/09/2021     08/31/2021     04/09/2021       BMP RESULTS:  Lab Results   Component Value Date     08/31/2021     03/17/2021    POTASSIUM 4.0 08/31/2021    POTASSIUM 3.9 03/17/2021    CHLORIDE 106 08/31/2021    CHLORIDE 106 03/17/2021    CO2 25 08/31/2021    CO2 24 03/17/2021    ANIONGAP 7 08/31/2021    ANIONGAP 8 03/17/2021     (H) 08/31/2021    GLC 85 03/17/2021    BUN 26 08/31/2021    BUN 32 (H) 03/17/2021    CR 1.48 (H) 08/31/2021    CR 1.80 (H) 03/17/2021    GFRESTIMATED 51 (L) 08/31/2021    GFRESTIMATED 34 (L) 06/22/2021    GFRESTIMATED 41 (L) 03/17/2021    GFRESTBLACK 41 (L) 06/22/2021    GFRESTBLACK 47 (L) 03/17/2021    RADAMES 8.8 08/31/2021    RADAMES 9.0 03/17/2021      LIPID RESULTS:  Lab Results   Component Value Date    CHOL 255 (H) 03/17/2021    HDL 89 03/17/2021     (H) 03/17/2021    TRIG 128 03/17/2021    NHDL 166 (H) 03/17/2021       IMMUNOSUPPRESSANT LEVELS  Lab Results   Component Value Date    CYCLSP <25 (L) 04/23/2020    DOSCYC 3235366 04/23/2020    TACROL 5.6 08/31/2021    TACROL 7.3 03/17/2021    DOSTAC 8/30/2021 08/31/2021    DOSTAC Not Provided 03/17/2021    RAPAMY 3.9 (L) 08/31/2021    RAPAMY 7.0 03/17/2021       No components found for: CK  Lab Results   Component Value Date    MAG 1.6 03/17/2021     Lab Results   Component Value Date    A1C 5.8 (H) 02/12/2020     Lab Results   Component Value Date    PHOS 2.6 03/17/2021     Lab Results    Component Value Date    NTBNPI 767 09/23/2019     Lab Results   Component Value Date    SAITESTMET [facility] 03/17/2021    SAICELL Class I 03/17/2021    FS2BZBOMJ None 03/17/2021    [id number] None 03/17/2021    SAIREPCOM  03/17/2021      Test performed by modified procedure. Serum heat inactivated and tested   by a modified ([place]) protocol including fetal calf serum addition.   High-risk, mfi >3,000. Mod-risk, mfi 500-3,000.       Lab Results   Component Value Date    SAIITESTME [facility] 03/17/2021    SAIICELL Class II 03/17/2021    [id number] None 03/17/2021    BQ0GNLSPOW None 03/17/2021    SAIIREPCOM  03/17/2021      Test performed by modified procedure. Serum heat inactivated and tested   by a modified ([place]) protocol including fetal calf serum addition.   High-risk, mfi >3,000. Mod-risk, mfi 500-3,000.       Lab Results   Component Value Date    CSPEC EDTA PLASMA 03/17/2021       Assessment and Plan:  Mr. Everton Larios is 56yr old male with a history of ASD (s/p repair in childhood), AFib/AF (s/p ablation), NICM, diastolic dysfunction, alcohol abuse, Pierce's esophagus, ulcerative colitis, GIB (s/p splenic embolization), and hypothyroidism, now s/p OHT and bypass of persistent left SVC to right atrial appendage 2/24/19, who is being evaluated via telephone for routine surveillance.     Labs from 8/31 showed stable electrolytes, renal function, and blood counts.  TSH was 0.14 (no free T4 available).  Sirolimus level 3.9, tac 5.6.    Mr. Larios sounds well today.  His weight is up slightly, but he does not endorse any signs of persistent fluid retention.  His BPs are stable, when checked.  Advised that he continue his current meds, with no changes.  Encouraged him to work on getting more regular exercise, which he understands.  Will plan for him to follow-up in clinic per protocol, or sooner should new concerns arise.      NICM, s/p OHT and bypass of persistent left SVC to right atrial appendage 2/24/19  His  postoperative course was c/b shock due to RV dysfunction requiring IABP support, small pneumoperitoneum (clinically insignificant), chest wall hematoma (former ICD site, s/p evacuation and drain placement 3/31/19), HCAP/klebsiella pneumonia, and FRANKLIN (required short-term HD, now recovered).  He was transferred to  rehab 4/3, and ultimately discharged to his local residence 4/15.     He was rehospitalized 4/28-5/9/19 with fevers, URI symptoms, abdominal pain, and diarrhea, and was found to be neutropenic with pseudomonas bacteremia and HCAP.  Chest CT showed diffuse bilateral solid with peripheral groundglass pulmonary nodules/opacities and mediastinal adenopathy, BAL grew pseudomonas, and fungitell was positive.  Transplant ID was consulted, and he was treated with 4 weeks of IV cefepime and micafungin.  Repeat CT 5/22 showed significantly decreased bilateral nodular and consolidative opacities.     He represented to [facility] 5/24/19 with FRANKLIN (creatinine 3.43), thought to be secondary to poor intake and self-adjusting diuretics.  He was given 2L NS and 1L LR, with creatinine trending down upon discharge 5/26.     Rejection history:  His biopsy 3/25/19 showed mild AMR1 with some staining for c4d.  Repeat biopsy 3/28 showed improved AMR and less reactive capillary staining, and subsequent biopsies have now shown resolution of AMR and improved capillary staining.  AlloMap scores:  < 35 recently  DSAs:  no  Coronary angio/Ischemic eval:  Coronary angiogram 3/2021 showed normal coronary arteries with no angiographic evidence of obstruction.  Last RHC:  3/2021 showed elevated biventricular filling pressures, with RA 17, mPA 24, PCW 16, and CI 2.6.  Echo/cMRI:  TTE 8/31/21 showed stable graft function, with LVEF 55-60% and normal RV size/function.  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    Serostatus:  - CMV D+/R+  - EBV D+/R+  - Toxo D-/R-     Immunosuppression:  - tacrolimus, goal level 3-5 (decreased due to ongoing CRI).  Level from 8/31 was 3.9.  - sirolimus goal level 6-8.  Level from 8/31 was 5.6.     Graft function:  - BPs:  Stable when checked.  Continue amlodipine 10mg daily.  - fluid status:  Euvolemic, off diuretics.      PPx:  - CAV:  Aspirin 81mg daily and rosuvastatin 10mg daily  - PCP:  pentamidine   - GI:  Omeprazole 40mg daily (note h/o carrera's esophagus), prn ondansetron, prn bello/senna    Health maintenance:  - dermatology exam:  UTD  - eye exam:  UTD  - dental exam:  Overdue, put off due to COVID  - COVID vaccines:  Completed, and booster  - flu shot:  Not yet, will get soon  - shingrix:  Has not received yet  - pneumonia shots:  Never had  - last colonoscopy:  2/2020  - follows with PCP re: thyroid        Ulcerative colitis  Carrera's esophagus  Follows with GI.    Pseudomonas bacteremia, resolved  HCAP, resolved  Neutropenia, resolved  He was rehospitalized 4/28-5/9/19 with fevers, URI symptoms, abdominal pain, and diarrhea, and was found to be neutropenic with pseudomonas bacteremia and HCAP.  Chest CT showed diffuse bilateral solid with peripheral groundglass pulmonary nodules/opacities and mediastinal adenopathy, BAL grew pseudomonas, and fungitell was positive.  Transplant ID was consulted, and he was treated with 4 weeks of IV cefepime and micafungin.  Repeat CT 5/22 showed significantly decreased bilateral nodular and consolidative opacities.     FRANKLIN, resolved  He represented to [facility] 5/24/19 with FRANKLIN (creatinine 3.43), thought to be secondary to poor intake and self-adjusting diuretics.  He was given 2L NS and 1L LR, with creatinine trending down upon discharge 5/26.        The above was reviewed with Mr. Larios, who verbalized understanding and will call with further questions/concerns.    26 minutes spent on the phone with  patient, with extra time spent preparing for visit, reviewing follow up, and completing documentation.      Elin Jensen DNP, FNP-BC, CHFN  Advanced Heart Failure Nurse Practitioner  [facility]  Patient Care Team:  Fredrick Wolff MD as PCP - General (Internal Medicine)  Jocelynn Jerez RN as Transplant Coordinator  Ric Shukla MD as MD (Infectious Diseases)  Agata Weathers MD as MD (INTERNAL MEDICINE - ENDOCRINOLOGY, DIABETES & METABOLISM)  Franko Preciado [facility] as Pharmacist (Pharmacist)  Jocelynn Jensen MD as Fellow (Student in organized health care education/training program)  Corinna Donis MD as Assigned Heart and Vascular Provider  Fredrick Wolff MD as Assigned PCP  Bairon Davalos MD as Assigned Surgical Provider

## 2021-09-22 NOTE — PROGRESS NOTES
Everton Larios is a 56 year old who is being evaluated via a billable telephone visit.      What phone number would you like to be contacted at? [phone]  How would you like to obtain your AVS? Nydia    Vitals - Patient Reported  Pain Score: No Pain (0) (No SOB)    Vitals were taken and medications reconciled.    Arley Acosta, RANDELL  7:18 AM

## 2021-09-22 NOTE — LETTER
9/22/2021      RE: Everton Larios  [address] [id number]       Dear Colleague,    Thank you for the opportunity to participate in the care of your patient, Everton Larios, at the [facility] [place] at [facility]. Please see a copy of my visit note below.    ADULT HEART TRANSPLANT CLINIC -- Telephone Visit  September 22, 2021    HPI:   Mr. Everton Larios is 56yr old male with a history of ASD (s/p repair in childhood), AFib/AF (s/p ablation), NICM, diastolic dysfunction, alcohol abuse, Pierce's esophagus, ulcerative colitis, GIB (s/p splenic embolization), and hypothyroidism, now s/p OHT and bypass of persistent left SVC to right atrial appendage 2/24/19, who is being evaluated via telephone for routine surveillance.     His postoperative course was c/b shock due to RV dysfunction requiring IABP support, small pneumoperitoneum (clinically insignificant), chest wall hematoma (former ICD site, s/p evacuation and drain placement 3/31/19), HCAP/klebsiella pneumonia, and FRANKLIN (required short-term HD, now recovered).  He was transferred to  rehab 4/3, and ultimately discharged to his local residence 4/15.     He was rehospitalized 4/28-5/9/19 with fevers, URI symptoms, abdominal pain, and diarrhea, and was found to be neutropenic with pseudomonas bacteremia and HCAP.  Chest CT showed diffuse bilateral solid with peripheral groundglass pulmonary nodules/opacities and mediastinal adenopathy, BAL grew pseudomonas, and fungitell was positive.  Transplant ID was consulted, and he was treated with 4 weeks of IV cefepime and micafungin.  Repeat CT 5/22 showed significantly decreased bilateral nodular and consolidative opacities.     He represented to [facility] 5/24/19 with FRANKLIN (creatinine 3.43), thought to be secondary to poor intake and self-adjusting diuretics.  He was given 2L NS and 1L LR, with creatinine trending down upon discharge  5/26.     Rejection history:  His biopsy 3/25/19 showed mild AMR1 with some staining for c4d.  Repeat biopsy 3/28 showed improved AMR and less reactive capillary staining, and subsequent biopsies have now shown resolution of AMR and improved capillary staining.  AlloMap scores:  < 35 recently  DSAs:  no  Coronary angio/Ischemic eval:  Coronary angiogram 3/2021 showed normal coronary arteries with no angiographic evidence of obstruction.  Last RHC:  3/2021 showed elevated biventricular filling pressures, with RA 17, mPA 24, PCW 16, and CI 2.6.  Echo/cMRI:  TTE 8/31/21 showed stable graft function, with LVEF 55-60% and normal RV size/function.  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    Since his last visit, he states that he has been doing well.  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  During the week, he remains active by cutting his parent's lawn maintenance, and denies exertional concerns.  He walks some, maybe not as much as he would like.  His breathing has been good, and he denies SOB.  He is amazed at what he is able to do, without any SOB.  He is sleeping well, in a bed with 1 pillow, without PND/orthopnea.  His appetite has been good, and he is eating regularly without nausea, vomiting, and diarrhea.  He has been constipated, and his gastroenterologist would like for him to stay away from [facility] and try to submit a stool sample for review.  He is not weighing himself regularly, but states that his weight is up slightly.  He believes that he was up to 178# at his last clinic visit, 171# on his home scale when last checked.  He denies persistent fluid retention, but notes that he can have some ankle edema in the evenings which always resolve by  morning.  He is not checking BPs regularly.  He will get headaches and neck stiffness if he does not wear his oral brace.  He gets coughs related to allergies.  He otherwise denies chest pain, palpitations, dizziness, falls, new headaches, acute vision changes, fevers, chills, sore throat, and signs of bleeding.      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 difficile diarrhea 09/23/2019     Priority: Medium     Transplanted heart (H) 05/30/2019     Priority: Medium     Added automatically from request for surgery [id number]       Central line clotted (H) 05/24/2019     Priority: Medium     FRANKLIN (acute kidney injury) (H) 05/24/2019     Priority: Medium     Sepsis due to Pseudomonas aeruginosa (H) 04/30/2019     Priority: Medium     Pulmonary nodules 04/30/2019     Priority: Medium     Compression fracture of thoracic vertebra (H) 04/30/2019     Priority: Medium     Abnormal white blood cell (WBC) count 04/26/2019     Priority: Medium     Debility 04/03/2019     Priority: Medium     Heart replaced by transplant (H) 02/24/2019     Priority: Medium     Donor CMV positive/ Recipient CMV positive.   Donor EBV positive/ Recipient EBV positive.     Donor NOT PHS higher risk          Iron deficiency anemia 10/18/2018     Priority: Medium     H/O congenital atrial septal defect (ASD) repair at age 3 10/04/2018     Priority: Medium     Ulcerative colitis (H) 10/04/2018     Priority: Medium      Hypothyroidism due to medication 10/04/2018     Priority: Medium     DJD (degenerative joint disease) - neck 10/04/2018     Priority: Medium     Pierce's esophagus 10/04/2018     Priority: Medium     Intermittent asthma without complication 10/04/2018     Priority: Medium     Chronic rhinitis 10/04/2018     Priority: Medium     Depression 10/04/2018     Priority: Medium       PAST MEDICAL HISTORY:  Past Medical History:   Diagnosis Date     Alcohol abuse      Arthritis     hands, neck     ASD (atrial septal defect)     s/p repair age 3     Asthma      Atrial fibrillation (H)      Pierce's esophagus 10/4/2018     Pierce's esophagus      Cataract      CHF (congestive heart failure) (H)      Chronic rhinitis 10/4/2018     Chronic systolic heart failure (H) 10/4/2018     Clotting disorder (H)      Congenital cardiomyopathy in  (H)      Depression 10/4/2018     Depression      Diastolic dysfunction      DJD (degenerative joint disease)     neck     DJD (degenerative joint disease) - neck 10/4/2018     H/O congenital atrial septal defect (ASD) repair at age 3 10/4/2018     History of anesthesia complications     nausea     History of transfusion      Hypothyroidism      Hypothyroidism due to medication 10/4/2018     ICD (implantable cardioverter-defibrillator) in place      ICD, [employer] ; gen change 2018 10/4/2018     Intermittent asthma without complication 10/4/2018     Nonischemic cardiomyopathy (H) 10/4/2018     On amiodarone therapy 10/4/2018     Pacemaker      Paroxysmal atrial fibrillation (H) 10/4/2018     S/P ablation of atrial fibrillation 10/4/2018     Systolic heart failure (H)      Ulcerative colitis (H)      Ulcerative colitis (H)      Ventricular tachycardia (H) 10/4/2018     Ventricular tachycardia (H)        CURRENT MEDICATIONS:  Prescription Medications as of 2021       Rx Number Disp Refills Start End Last Dispensed Date Next Fill Date Owning Pharmacy    acetaminophen  (TYLENOL) 325 MG tablet    4/1/2019        Sig: Take 2 tablets (650 mg) by mouth every 4 hours as needed for mild pain    Class: Transitional    Route: Oral    albuterol (PROAIR HFA/PROVENTIL HFA/VENTOLIN HFA) 108 (90 Base) MCG/ACT inhaler    10/4/2018        Sig: Inhale 2 puffs into the lungs every 6 hours as needed for shortness of breath / dyspnea or wheezing    Class: Historical    Route: Inhalation    alendronate (FOSAMAX) 70 MG tablet  12 tablet 4 11/24/2020    [facility] [id number] [place] [address]    Sig: Take 1 tablet (70 mg) by mouth every 7 days Take 60 minutes before am meal with 8 oz. water. Remain upright for 30 minutes.    Class: E-Prescribe    Route: Oral    amLODIPine (NORVASC) 5 MG tablet  60 tablet 11 9/10/2021    [facility] [id number] [place] [address]    Sig: TAKE 2 TABLETS BY MOUTH EVERY DAY    Class: E-Prescribe    aspirin (ASA) 81 MG chewable tablet    4/2/2019        Sig: Take 1 tablet (81 mg) by mouth daily    Class: Transitional    Route: Oral    calcium carbonate 600 mg-vitamin D 400 units (CALTRATE) 600-400 MG-UNIT per tablet    4/1/2019        Sig: Take 1 tablet by mouth 2 times daily (with meals)    Class: Transitional    Route: Oral    cetirizine (ZYRTEC) 10 MG tablet        [id number] [place] [address]    Sig: Take 10 mg by mouth daily    Class: Historical    Route: Oral    DULoxetine (CYMBALTA) 20 MG EC capsule            Sig: Take 20 mg by mouth daily    Class: Historical    Route: Oral    fluticasone (FLOVENT HFA) 220 MCG/ACT Inhaler            Sig: Inhale 2 puffs into the lungs 2 times daily    Class: Historical    Route: Inhalation    levothyroxine (SYNTHROID/LEVOTHROID) 75 MCG tablet  90 tablet 3 7/28/2021    [facility] [id number] [place] [address]    Sig: Take 1 tablet (75 mcg) by mouth daily    Class: E-Prescribe    Notes to Pharmacy: Dose reduction from 88 mcg.    Route: Oral    melatonin 3  MG tablet    4/1/2019        Sig: Take 2 tablets (6 mg) by mouth At Bedtime    Class: Transitional    Route: Oral    multivitamin w/minerals (THERA-VIT-M) tablet    4/12/2019        Sig: Take 1 tablet by mouth daily    Class: OTC    Route: Oral    omeprazole (PRILOSEC) 40 MG DR capsule    4/26/2019    [facility] [id number] [place] [address]    Sig: Take 1 capsule (40 mg) by mouth daily    Class: Historical    Route: Oral    ondansetron (ZOFRAN-ODT) 4 MG ODT tab   3 11/7/2018    [facility] [id number] [place] [address]    Sig: DISSOLVE 1 TABLET ON THE TONGUE EVERY EIGHT HOURS AS NEEDED    Class: Historical    polyethylene glycol (MIRALAX/GLYCOLAX) powder            Sig: Take 1 capful by mouth daily    Class: Historical    Route: Oral    rosuvastatin (CRESTOR) 10 MG tablet  90 tablet 3 5/17/2021    [id number] [place] [address]    Sig: TAKE 1 TABLET BY MOUTH EVERY DAY    Class: E-Prescribe    Notes to Pharmacy: 90 day supply if able    senna (SENOKOT) 8.6 MG tablet            Sig: Take 2 tablets by mouth 2 times daily as needed for constipation    Class: Historical    Route: Oral    sirolimus (GENERIC EQUIVALENT) 0.5 MG tablet  720 tablet 3 9/1/2021    [facility] [id number] [place] [address]    Sig: Take 8 tablets (4 mg) by mouth daily    Class: E-Prescribe    Notes to Pharmacy: TXP DT 2/24/2019 (Heart) TXP Dischg DT 4/3/2019 DX Heart replaced by transplant Z94.1 TX [facility] ([place], [place])    Route: Oral    tacrolimus (GENERIC EQUIVALENT) 0.5 MG capsule  90 capsule 11 9/18/2020    [id number] [place] [address]    Sig: On HOLD taking 3 mg bid.    Class: E-Prescribe    Notes to Pharmacy: TXP DT 2/24/2019 (Heart) TXP Dischg DT 4/3/2019 DX Heart replaced by transplant Z94.1 TX [facility] ([place], [place])    tacrolimus  (GENERIC EQUIVALENT) 1 MG capsule  150 capsule 11 3/17/2021        Sig: Take 2 capsules (2 mg) by mouth every morning AND 2 capsules (2 mg) every evening.    Class: No Print Out    Notes to Pharmacy: TXP DT 2/24/2019 (Heart) TXP Dischg DT 4/3/2019 DX Heart replaced by transplant Z94.1 [place] [facility] ([place], [place])    Route: Oral          ROS:   As per HPI.    Exam:  Deferred/Telephone.      Labs:  CBC RESULTS:   Lab Results   Component Value Date    WBC 5.7 08/31/2021    WBC 5.0 04/09/2021    RBC 5.02 08/31/2021    RBC 4.61 04/09/2021    HGB 12.3 (L) 08/31/2021    HGB 11.2 (A) 04/09/2021    HCT 37.5 (L) 08/31/2021    HCT 34.6 04/09/2021    MCV 75 (L) 08/31/2021    MCV 75 04/09/2021    MCH 24.5 (L) 08/31/2021    MCH 24.3 04/09/2021    MCHC 32.8 08/31/2021    MCHC 32.4 04/09/2021    RDW 15.9 (H) 08/31/2021    RDW 14.9 04/09/2021     08/31/2021     04/09/2021       BMP RESULTS:  Lab Results   Component Value Date     08/31/2021     03/17/2021    POTASSIUM 4.0 08/31/2021    POTASSIUM 3.9 03/17/2021    CHLORIDE 106 08/31/2021    CHLORIDE 106 03/17/2021    CO2 25 08/31/2021    CO2 24 03/17/2021    ANIONGAP 7 08/31/2021    ANIONGAP 8 03/17/2021     (H) 08/31/2021    GLC 85 03/17/2021    BUN 26 08/31/2021    BUN 32 (H) 03/17/2021    CR 1.48 (H) 08/31/2021    CR 1.80 (H) 03/17/2021    GFRESTIMATED 51 (L) 08/31/2021    GFRESTIMATED 34 (L) 06/22/2021    GFRESTIMATED 41 (L) 03/17/2021    GFRESTBLACK 41 (L) 06/22/2021    GFRESTBLACK 47 (L) 03/17/2021    RADAMES 8.8 08/31/2021    RADAMES 9.0 03/17/2021      LIPID RESULTS:  Lab Results   Component Value Date    CHOL 255 (H) 03/17/2021    HDL 89 03/17/2021     (H) 03/17/2021    TRIG 128 03/17/2021    NHDL 166 (H) 03/17/2021       IMMUNOSUPPRESSANT LEVELS  Lab Results   Component Value Date    CYCLSP <25 (L) 04/23/2020    DOSCYC [id number] 04/23/2020    TACROL 5.6 08/31/2021    TACROL 7.3 03/17/2021     DOSTAC 8/30/2021 08/31/2021    DOSTAC Not Provided 03/17/2021    RAPAMY 3.9 (L) 08/31/2021    RAPAMY 7.0 03/17/2021       No components found for: CK  Lab Results   Component Value Date    MAG 1.6 03/17/2021     Lab Results   Component Value Date    A1C 5.8 (H) 02/12/2020     Lab Results   Component Value Date    PHOS 2.6 03/17/2021     Lab Results   Component Value Date    NTBNPI 767 09/23/2019     Lab Results   Component Value Date    SAITESTMET SA FCS 03/17/2021    SAICELL Class I 03/17/2021    [id number] None 03/17/2021    [id number] None 03/17/2021    SAIREPCOM  03/17/2021      Test performed by modified procedure. Serum heat inactivated and tested   by a modified ([place]) protocol including fetal calf serum addition.   High-risk, mfi >3,000. Mod-risk, mfi 500-3,000.       Lab Results   Component Value Date    SAIITESTME [facility] 03/17/2021    SAIICELL Class II 03/17/2021    [id number] None 03/17/2021    [id number] None 03/17/2021    SAIIREPCOM  03/17/2021      Test performed by modified procedure. Serum heat inactivated and tested   by a modified ([place]) protocol including fetal calf serum addition.   High-risk, mfi >3,000. Mod-risk, mfi 500-3,000.       Lab Results   Component Value Date    CSPEC EDTA PLASMA 03/17/2021       Assessment and Plan:  Mr. Everton Larios is 56yr old male with a history of ASD (s/p repair in childhood), AFib/AF (s/p ablation), NICM, diastolic dysfunction, alcohol abuse, Pierce's esophagus, ulcerative colitis, GIB (s/p splenic embolization), and hypothyroidism, now s/p OHT and bypass of persistent left SVC to right atrial appendage 2/24/19, who is being evaluated via telephone for routine surveillance.     Labs from 8/31 showed stable electrolytes, renal function, and blood counts.  TSH was 0.14 (no free T4 available).  Sirolimus level 3.9, tac 5.6.    Mr. Larios sounds well today.  His weight is up slightly, but he does not endorse any signs of persistent fluid retention.  His BPs are  stable, when checked.  Advised that he continue his current meds, with no changes.  Encouraged him to work on getting more regular exercise, which he understands.  Will plan for him to follow-up in clinic per protocol, or sooner should new concerns arise.      NICM, s/p OHT and bypass of persistent left SVC to right atrial appendage 2/24/19  His postoperative course was c/b shock due to RV dysfunction requiring IABP support, small pneumoperitoneum (clinically insignificant), chest wall hematoma (former ICD site, s/p evacuation and drain placement 3/31/19), HCAP/klebsiella pneumonia, and FRANKLIN (required short-term HD, now recovered).  He was transferred to  rehab 4/3, and ultimately discharged to his local residence 4/15.     He was rehospitalized 4/28-5/9/19 with fevers, URI symptoms, abdominal pain, and diarrhea, and was found to be neutropenic with pseudomonas bacteremia and HCAP.  Chest CT showed diffuse bilateral solid with peripheral groundglass pulmonary nodules/opacities and mediastinal adenopathy, BAL grew pseudomonas, and fungitell was positive.  Transplant ID was consulted, and he was treated with 4 weeks of IV cefepime and micafungin.  Repeat CT 5/22 showed significantly decreased bilateral nodular and consolidative opacities.     He represented to [facility] 5/24/19 with FRANKLIN (creatinine 3.43), thought to be secondary to poor intake and self-adjusting diuretics.  He was given 2L NS and 1L LR, with creatinine trending down upon discharge 5/26.     Rejection history:  His biopsy 3/25/19 showed mild AMR1 with some staining for c4d.  Repeat biopsy 3/28 showed improved AMR and less reactive capillary staining, and subsequent biopsies have now shown resolution of AMR and improved capillary staining.  AlloMap scores:  < 35 recently  DSAs:  no  Coronary angio/Ischemic eval:  Coronary angiogram 3/2021 showed normal coronary arteries with no angiographic evidence of obstruction.  Last RHC:  3/2021 showed elevated  biventricular filling pressures, with RA 17, mPA 24, PCW 16, and CI 2.6.  Echo/cMRI:  TTE 8/31/21 showed stable graft function, with LVEF 55-60% and normal RV size/function.  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    Serostatus:  - CMV D+/R+  - EBV D+/R+  - Toxo D-/R-     Immunosuppression:  - tacrolimus, goal level 3-5 (decreased due to ongoing CRI).  Level from 8/31 was 3.9.  - sirolimus goal level 6-8.  Level from 8/31 was 5.6.     Graft function:  - BPs:  Stable when checked.  Continue amlodipine 10mg daily.  - fluid status:  Euvolemic, off diuretics.      PPx:  - CAV:  Aspirin 81mg daily and rosuvastatin 10mg daily  - PCP:  pentamidine   - GI:  Omeprazole 40mg daily (note h/o carrera's esophagus), prn ondansetron, prn bello/senna    Health maintenance:  - dermatology exam:  UTD  - eye exam:  UTD  - dental exam:  Overdue, put off due to COVID  - COVID vaccines:  Completed, and booster  - flu shot:  Not yet, will get soon  - shingrix:  Has not received yet  - pneumonia shots:  Never had  - last colonoscopy:  2/2020  - follows with PCP re: thyroid        Ulcerative colitis  Carrera's esophagus  Follows with GI.    Pseudomonas bacteremia, resolved  HCAP, resolved  Neutropenia, resolved  He was rehospitalized 4/28-5/9/19 with fevers, URI symptoms, abdominal pain, and diarrhea, and was found to be neutropenic with pseudomonas bacteremia and HCAP.  Chest CT showed diffuse bilateral solid with peripheral groundglass pulmonary nodules/opacities and mediastinal adenopathy, BAL grew pseudomonas, and fungitell was positive.  Transplant ID was consulted, and he was treated with 4 weeks of IV cefepime and micafungin.  Repeat CT 5/22 showed significantly decreased bilateral nodular and consolidative opacities.     FRANKLIN, resolved  He represented to [facility]  5/24/19 with FRANKLIN (creatinine 3.43), thought to be secondary to poor intake and self-adjusting diuretics.  He was given 2L NS and 1L LR, with creatinine trending down upon discharge 5/26.        The above was reviewed with Mr. Larios, who verbalized understanding and will call with further questions/concerns.    26 minutes spent on the phone with patient, with extra time spent preparing for visit, reviewing follow up, and completing documentation.      Elin Jensen DNP, FNP-BC, CHFN  Advanced Heart Failure Nurse Practitioner  [facility]  Patient Care Team:  Fredrick Wolff MD as PCP - General (Internal Medicine)  Jocelynn Jerez RN as Transplant Coordinator  Ric Shukla MD as MD (Infectious Diseases)  Agata Weathers MD as MD (INTERNAL MEDICINE - ENDOCRINOLOGY, DIABETES & METABOLISM)  Franko Preciado, [facility], located within [facility] as Pharmacist (Pharmacist)  Corinna Donis MD as Assigned Heart and Vascular Provider  Bairon Davalos MD as Assigned Surgical Provider

## 2021-09-28 ENCOUNTER — OFFICE VISIT (OUTPATIENT)
Dept: INTERNAL MEDICINE | Facility: CLINIC | Age: 56
End: 2021-09-28
Payer: COMMERCIAL

## 2021-09-28 VITALS
SYSTOLIC BLOOD PRESSURE: 148 MMHG | WEIGHT: 184 LBS | DIASTOLIC BLOOD PRESSURE: 80 MMHG | BODY MASS INDEX: 27.98 KG/M2 | HEART RATE: 104 BPM

## 2021-09-28 DIAGNOSIS — K51.90 ULCERATIVE COLITIS WITHOUT COMPLICATIONS, UNSPECIFIED LOCATION (H): ICD-10-CM

## 2021-09-28 DIAGNOSIS — Z23 NEED FOR IMMUNIZATION AGAINST INFLUENZA: ICD-10-CM

## 2021-09-28 DIAGNOSIS — N18.30 CHRONIC RENAL INSUFFICIENCY, STAGE 3 (MODERATE) (H): ICD-10-CM

## 2021-09-28 DIAGNOSIS — F34.1 DYSTHYMIA: ICD-10-CM

## 2021-09-28 DIAGNOSIS — E03.9 HYPOTHYROIDISM, UNSPECIFIED TYPE: Primary | ICD-10-CM

## 2021-09-28 DIAGNOSIS — I10 ESSENTIAL HYPERTENSION: ICD-10-CM

## 2021-09-28 DIAGNOSIS — S22.080D COMPRESSION FRACTURE OF T12 VERTEBRA WITH ROUTINE HEALING, SUBSEQUENT ENCOUNTER: ICD-10-CM

## 2021-09-28 DIAGNOSIS — Z94.1 TRANSPLANTED HEART (H): ICD-10-CM

## 2021-09-28 DIAGNOSIS — J45.20 MILD INTERMITTENT ASTHMA WITHOUT COMPLICATION: ICD-10-CM

## 2021-09-28 DIAGNOSIS — K22.70 BARRETT'S ESOPHAGUS WITHOUT DYSPLASIA: ICD-10-CM

## 2021-09-28 DIAGNOSIS — G56.01 CARPAL TUNNEL SYNDROME OF RIGHT WRIST: ICD-10-CM

## 2021-09-28 PROCEDURE — 90471 IMMUNIZATION ADMIN: CPT | Performed by: INTERNAL MEDICINE

## 2021-09-28 PROCEDURE — 99214 OFFICE O/P EST MOD 30 MIN: CPT | Mod: 25 | Performed by: INTERNAL MEDICINE

## 2021-09-28 PROCEDURE — 90686 IIV4 VACC NO PRSV 0.5 ML IM: CPT | Performed by: INTERNAL MEDICINE

## 2021-09-28 ASSESSMENT — ASTHMA QUESTIONNAIRES
QUESTION_3 LAST FOUR WEEKS HOW OFTEN DID YOUR ASTHMA SYMPTOMS (WHEEZING, COUGHING, SHORTNESS OF BREATH, CHEST TIGHTNESS OR PAIN) WAKE YOU UP AT NIGHT OR EARLIER THAN USUAL IN THE MORNING: NOT AT ALL
QUESTION_2 LAST FOUR WEEKS HOW OFTEN HAVE YOU HAD SHORTNESS OF BREATH: NOT AT ALL
QUESTION_5 LAST FOUR WEEKS HOW WOULD YOU RATE YOUR ASTHMA CONTROL: COMPLETELY CONTROLLED
ACT_TOTALSCORE: 25
ACUTE_EXACERBATION_TODAY: NO
QUESTION_4 LAST FOUR WEEKS HOW OFTEN HAVE YOU USED YOUR RESCUE INHALER OR NEBULIZER MEDICATION (SUCH AS ALBUTEROL): NOT AT ALL
QUESTION_1 LAST FOUR WEEKS HOW MUCH OF THE TIME DID YOUR ASTHMA KEEP YOU FROM GETTING AS MUCH DONE AT WORK, SCHOOL OR AT HOME: NONE OF THE TIME

## 2021-09-28 ASSESSMENT — PATIENT HEALTH QUESTIONNAIRE - PHQ9: SUM OF ALL RESPONSES TO PHQ QUESTIONS 1-9: 0

## 2021-09-28 NOTE — PATIENT INSTRUCTIONS
Check your blood pressure on your own.  If running above 135/85, see me this fall to discuss additional blood pressure medication.  Continue on current amlodipine at this time.  Maintain a low-salt diet.    Recheck your TSH in November or December.  Continue on current dose of levothyroxine at this time.    If you are otherwise stable, see me in 6 months for a checkup.

## 2021-09-28 NOTE — PROGRESS NOTES
[facility] clinic Follow Up Note    Everton Larios   56 year old male    Date of Visit: 9/28/2021    Chief Complaint   Patient presents with     Follow Up     3 month checkup     Subjective  Everton is a 56-year-old male status post cardiac transplant for congenital cardiomyopathy.  He did suffer some nephrotoxicity associated with his transplant treatment, now stabilized with creatinine 1.48 August 31, 2021.    He was seen earlier this month at his transplant clinic, no change in treatment plan and routine follow-up given.    He did see dermatology in July, negative skin exam and given a 1 year follow-up.    No new dental issues but he still needs to see his dentist for routine care.    He did have a cardiac MRI March 2021 that was negative for any ischemia.  Previous evaluation did show some mild coronary artery disease in the heart transplant and he is on rosuvastatin 10 mg a day and low-dose aspirin.    He is not had any acute increasing shortness of breath or chest symptoms.    Electronic tachycardia with transplanted heart.    He is on amlodipine 10 mg a day.  He is not checked his blood pressure recently.  His blood pressure has been normal in the past although was borderline at 140/80 in June of this year.    I did review the gastroenterology note from August 31, 2021 and I do note a blood pressure 137/99 on that recording.    No new headache.  No worsening daytime sleepiness.    He states he is maintaining a low-salt diet.    Patient has a history of hypothyroidism.  He had a low TSH of 0.4 in July of this year and his Synthroid was lowered further down to 75 mcg.  His TSH on the 31st was 0.14.  He denies any worsening fatigue.  He feels somewhat less jittery but still with a mild essential type tremor.  Sleeping well at night.    He had previously been on 100 mcg of Synthroid prior to dose reduction earlier this year.    Colitis diagnosed in 2005 in remission.  Bowels are regular.  Not on mesalamine  or balsalazide due to cost.  2020 colonoscopy showed ulcerative colitis in remission and 1 polyp.    Gastroenterology plans follow-up colonoscopy and EGD 2022.  He is on omeprazole for Pierce's esophagus, last EGD .  No new swallowing issues.    He continues on Fosamax for history of T12 compression fracture.  No loose teeth or new dental issues.    Chronic dysthymia well-controlled on Cymbalta 20 mg a day.  His father did die earlier this year.    Mild intermittent asthma without flare.  Not needing inhalers currently.    Some intermittent right hand carpal tunnel symptoms did improve earlier this summer, just intermittent mild symptoms now.  Worse when twisting his wrist in certain directions.    No fever or new lymphadenopathy.  No hematuria.    PMHx:    Past Medical History:   Diagnosis Date     Alcohol abuse      Arthritis     hands, neck     ASD (atrial septal defect)     s/p repair age 3     Asthma      Atrial fibrillation (H)      Pierce's esophagus 10/4/2018     Pierce's esophagus      Cataract      CHF (congestive heart failure) (H)      Chronic rhinitis 10/4/2018     Chronic systolic heart failure (H) 10/4/2018     Clotting disorder (H)      Congenital cardiomyopathy in  (H)      Depression 10/4/2018     Depression      Diastolic dysfunction      DJD (degenerative joint disease)     neck     DJD (degenerative joint disease) - neck 10/4/2018     H/O congenital atrial septal defect (ASD) repair at age 3 10/4/2018     History of anesthesia complications     nausea     History of transfusion      Hypothyroidism      Hypothyroidism due to medication 10/4/2018     ICD (implantable cardioverter-defibrillator) in place      ICD, [employer] ; gen change 2018 10/4/2018     Intermittent asthma without complication 10/4/2018     Nonischemic cardiomyopathy (H) 10/4/2018     On amiodarone therapy 10/4/2018     Pacemaker      Paroxysmal atrial fibrillation (H) 10/4/2018      S/P ablation of atrial fibrillation 10/4/2018     Systolic heart failure (H)      Ulcerative colitis (H) 2005     Ulcerative colitis (H)      Ventricular tachycardia (H) 10/4/2018     Ventricular tachycardia (H)      PSHx:    Past Surgical History:   Procedure Laterality Date     ABLATION OF DYSRHYTHMIC FOCUS      for A fib     AICD, DUAL CHAMBER       ASD REPAIR  1968     BRONCHOSCOPY (RIGID OR FLEXIBLE), DIAGNOSTIC N/A 4/30/2019    Procedure: BRONCHOSCOPY, WITH BAL;  Surgeon: Margot Chiang MD;  Location:  GI     CARDIAC DEFIBRILLATOR PLACEMENT      x2--last was Feb 2018     COLONOSCOPY N/A 2/20/2020    Procedure: COLONOSCOPY, WITH POLYPECTOMY AND BIOPSY;  Surgeon: Ranjeet Cooper MD;  Location:  GI     COLONOSCOPY N/A 2/5/2015    Procedure: COLONOSCOPY with biopsy;  Surgeon: Fernie Akers MD;  Location: [facility];  Service:      COLONOSCOPY N/A 12/19/2017    Procedure: COLONOSCOPY with biopsies and polypectomies using snare;  Surgeon: Gael Romero MD;  Location: [facility];  Service:      CV CORONARY ANGIOGRAM N/A 2/12/2020    Procedure: CV CORONARY ANGIOGRAM;  Surgeon: Isiah Mcallister MD;  Location:  HEART CARDIAC CATH LAB     CV CORONARY ANGIOGRAM N/A 3/17/2021    Procedure: CV CORONARY ANGIOGRAM;  Surgeon: Santino Mckeon MD;  Location:  HEART CARDIAC CATH LAB     CV HEART BIOPSY N/A 3/4/2019    Procedure: Heart Biopsy;  Surgeon: Abhijeet Titus MD;  [facility]     CV HEART BIOPSY N/A 3/25/2019    Procedure: Heart Biopsy;  Surgeon: Yves Rodrigues MD;  Location:  HEART CARDIAC CATH LAB     CV HEART BIOPSY N/A 3/28/2019    Procedure: Heart Cath Heart Biopsy;  Surgeon: Yves Rodrigues MD;  [facility]     CV HEART BIOPSY N/A 4/10/2019    Procedure: CV HEART BIOPSY;  Surgeon: Isiah Mcallister MD;  Location:  HEART CARDIAC CATH LAB     CV HEART BIOPSY N/A 4/24/2019    Procedure: CV HEART BIOPSY;   Surgeon: Isiah Mcallister MD;  [facility]     CV HEART BIOPSY N/A 5/8/2019    Procedure: CV HEART BIOPSY;  Surgeon: Isiah Mcallister MD;  [facility]     CV HEART BIOPSY N/A 6/4/2019    Procedure: CV HEART BIOPSY;  Surgeon: Alton Solomon MD;  Location:  HEART CARDIAC CATH LAB     CV HEART BIOPSY N/A 5/22/2019    Procedure: CV HEART BIOPSY;  Surgeon: Yves Rodrigues MD;  [facility]     CV HEART BIOPSY N/A 7/17/2019    Procedure: CV HEART BIOPSY;  Surgeon: Isiah Mcallister MD;  Location:  HEART CARDIAC CATH LAB     CV HEART BIOPSY N/A 8/13/2019    Procedure: CV HEART BIOPSY;  Surgeon: Jackson Patten MD;  Location:  HEART CARDIAC CATH LAB     CV HEART BIOPSY N/A 10/15/2019    Procedure: CV HEART BIOPSY;  Surgeon: Santino Mckeon MD;  Location:  HEART CARDIAC CATH LAB     CV HEART BIOPSY N/A 2/12/2020    Procedure: CV HEART BIOPSY;  Surgeon: Isiah Mcallister MD;  Location:  HEART CARDIAC CATH LAB     CV HEART BIOPSY N/A 5/5/2020    Procedure: CV HEART BIOPSY;  Surgeon: Yves Rodrigues MD;  Location:  HEART CARDIAC CATH LAB     CV HEART BIOPSY N/A 3/17/2021    Procedure: CV HEART BIOPSY;  Surgeon: Santino Mckeon MD;  Location:  HEART CARDIAC CATH LAB     CV INTRA AORTIC BALLOON N/A 2/18/2019    Procedure: Intra-Aortic Balloon;  Surgeon: Alton Solomon MD;  Location:  HEART CARDIAC CATH LAB     CV INTRA AORTIC BALLOON N/A 2/20/2019    Procedure: Replace subclavian IABP;  Surgeon: Yves Rodrigues MD;  Location: [facility] CARDIAC CATH LAB     CV INTRAVASULAR ULTRASOUND N/A 2/12/2020    Procedure: CV INTRAVASCULAR ULTRASOUND;  Surgeon: Isiah Mcallister MD;  Location:  HEART CARDIAC CATH LAB     CV LEFT HEART CATH N/A 3/19/2019    Procedure: Left Heart Cath;  Surgeon: Yves Rodrigues MD;  Location:  HEART CARDIAC CATH LAB     CV LEFT HEART CATH N/A 2/12/2020     Procedure: Left Heart Cath;  Surgeon: Isiah Mcallister MD;  [facility]     CV MYOCARDIAL BIOPSY N/A 3/19/2019    Procedure: RHC/HBx - Femoral access for 3/19 per Nimisha GONZALEZ;  Surgeon: Yves Rodrigues MD;  [facility]     CV MYOCARDIAL BIOPSY N/A 3/11/2019    Procedure: ADD ON RHC/HBX;  Surgeon: Alton Solomon MD;  [facility]     CV RIGHT HEART CATH MEASUREMENTS RECORDED N/A 3/25/2019    Procedure: Right Heart Cath;  Surgeon: Yves Rodrigues MD;  [facility]     CV RIGHT HEART CATH MEASUREMENTS RECORDED N/A 3/28/2019    Procedure: Heart Cath Right Heart Cath;  Surgeon: Yves Rodrigues MD;  [facility]     CV RIGHT HEART CATH MEASUREMENTS RECORDED N/A 4/24/2019    Procedure: CV RIGHT HEART CATH;  Surgeon: Isiah Mcallister MD;  [facility]     CV RIGHT HEART CATH MEASUREMENTS RECORDED N/A 6/4/2019    Procedure: CV RIGHT HEART CATH;  Surgeon: Alton Solomon MD;  [facility]     CV RIGHT HEART CATH MEASUREMENTS RECORDED N/A 5/22/2019    Procedure: CV RIGHT HEART CATH;  Surgeon: Yves Rodrigues MD;  [facility]     CV RIGHT HEART CATH MEASUREMENTS RECORDED N/A 8/13/2019    Procedure: CV RIGHT HEART CATH;  Surgeon: Jackson Patten MD;  [facility]     CV RIGHT HEART CATH MEASUREMENTS RECORDED N/A 10/15/2019    Procedure: CV RIGHT HEART CATH;  Surgeon: Santino Mckeon MD;  [facility]     CV RIGHT HEART CATH MEASUREMENTS RECORDED N/A 2/12/2020    Procedure: CV RIGHT HEART CATH;  Surgeon: Isiah Mcallister MD;  [facility]     CV RIGHT HEART CATH MEASUREMENTS RECORDED N/A 3/17/2021    Procedure: CV RIGHT HEART CATH;  Surgeon: Santino Mckeon MD;  [facility]     ESOPHAGOSCOPY,  GASTROSCOPY, DUODENOSCOPY (EGD), COMBINED N/A 12/19/2017    Procedure: ESOPHAGOGASTRODUODENOSCOPY (EGD) with biopsies;  Surgeon: Gael Romero MD;  Location: [facility];  Service:      HAND SURGERY Left      HC REVISE MEDIAN N/CARPAL TUNNEL SURG  9/21/2016    Procedure: OPEN RIGHT CARPAL TUNNEL RELEASE CORTISONE INJECTION RIGHT THUMB;  Surgeon: Duong Santoyo MD;  Location: [facility] OR;  Service: Orthopedics     INCISION AND DRAINAGE CHEST WASHOUT, COMBINED N/A 3/21/2019    Procedure: Incision And Drainage; Evacuation Right Chest Wall Hematoma;  Surgeon: Dewayne House MD;  [facility]     INSERT INTRAAORTIC BALLOON PUMP Left 2/19/2019    Procedure: Insert left  Subclavian Balloon Pump,  Removal Right femoral arterial balloom pump sheath;  Surgeon: Dewayne House MD;  [facility]     INSERT INTRAAORTIC BALLOON PUMP Left 2/21/2019    Procedure: SUBCLAVIAN BALLOON PUMP PLACEMENT;  Surgeon: Ben White MD;  [facility]     INSERT INTRACORONARY STENT      x2     IR PICC PLACEMENT > 5 YRS OF AGE  5/8/2019     TRANSPLANT HEART RECIPIENT N/A 2/24/2019    Procedure: TRANSPLANT HEART RECIPIENT;  Surgeon: Dewayne House MD;  [facility]     Immunizations:   Immunization History   Administered Date(s) Administered     COVID-19,PF,Pfizer 03/19/2021, 04/09/2021, 08/21/2021     Influenza (IIV3) PF 10/22/2008, 09/25/2009, 11/07/2011, 11/30/2012, 10/27/2013, 10/02/2014     Influenza Vaccine IM > 6 months Valent IIV4 (Alfuria,Fluzone) 10/02/2015, 09/20/2019, 09/16/2020     Influenza Vaccine, 6+MO IM (QUADRIVALENT W/PRESERVATIVES) 09/12/2016, 10/16/2017, 09/20/2018     OPV, trivalent, live 10/20/1978     Pneumo Conj 13-V (2010&after) 09/26/2018     Pneumococcal 23 valent 09/28/2019     TDAP Vaccine (Boostrix) 07/20/2012     Td (Adult), Adsorbed 10/20/1978       ROS A comprehensive review of systems was performed and was otherwise negative    Medications, allergies, and  problem list were reviewed and updated    Exam  BP (!) 148/80   Pulse 104   Wt 83.5 kg (184 lb)   BMI 27.98 kg/m    Alert and oriented x3, appears well.  No jaundice.  Lungs are clear without wheezing or crackles.  Somewhat decreased breath sounds in the left base consistent with cardiac transplant status.  Heart is tachycardic, which is baseline.  But no murmur rub or gallop.  There is no ankle edema.  Abdomen is mildly overweight but nontender.    Blood pressure was initially 150/90, above number was my recheck    Assessment/Plan  1. Hypothyroidism, unspecified type  Previously hyperthyroid.  TSH was approaching normal range in August.  Continue on current dose of 75 mcg Synthroid but recheck TSH later this fall when he is having blood work done for his transplant monitoring.    He was warned of significant increased fatigue or other changes before then to recheck early.  Recheck again in 6 months if stable this fall.  - TSH; Future    2. Need for immunization against influenza  Given today.  Patient states he is tolerated flu shots in the past and has not reacted or had allergic reactions to them.  Patient wishes to proceed today with flu shot.  - INFLUENZA VACCINE IM >6 MO VALENT IIV4 (ALFURIA/FLUZONE)    He has had his COVID-19 vaccine booster already    3. Essential hypertension  Not controlled.  Has been controlled in the past.  He has not checked it recently.  Elevated blood pressure gastroenterology visit in August as well.    Continue amlodipine 10 mg a day.    He may need a low-dose diuretic to help control blood pressure.  Consider half tablet of Maxide or furosemide if needed to control blood pressure.  I did asked patient to follow blood pressure on his own, he does have a cuff.  If it is running above 135/85, patient will follow up in clinic for discussion and additional blood pressure medication.    Patient did state that he was rushing in the clinic today and somewhat stressed.    4. Chronic  renal insufficiency, stage 3 (moderate) (H)  Stable on check last month.  Associated with transplant.    5. Transplanted heart (H)  Stable on evaluation with transplant team earlier this month and labs in August.  Continue management through the transplant team.    Patient was reminded to see dentist for routine dental care.  Consider Shingrix vaccine in the future.    Report of some mild coronary disease in transplanted heart.  Cardiac MRI stress was negative in March.  Continues on aspirin and Crestor 10 mg.    Tachycardia baseline, the transplanted heart    6. Mild intermittent asthma without complication  No flare.  Not needing inhalers.    7. Carpal tunnel syndrome of right wrist  Mild intermittent symptoms.  No plans for intervention    8. Dysthymia  Well-controlled.  Continue Cymbalta 20 mg long-term    9. Compression fracture of T12 vertebra with routine healing, subsequent encounter  No new back pain complaints.  Plan to continue Fosamax for a total of 5 years.    10. Ulcerative colitis without complications, unspecified location (H)  In remission.  History of colon polyp.  Follow-up colonoscopy February 2022 through gastroenterology clinic.    11. Pierce's esophagus without dysplasia  EGD February 2022 with colonoscopy.  Continue omeprazole.  No new swallowing issues.      Return in about 6 months (around 3/28/2022) for Follow up.   Patient Instructions   Check your blood pressure on your own.  If running above 135/85, see me this fall to discuss additional blood pressure medication.  Continue on current amlodipine at this time.  Maintain a low-salt diet.    Recheck your TSH in November or December.  Continue on current dose of levothyroxine at this time.    If you are otherwise stable, see me in 6 months for a checkup.    Fredrick Wolff MD, MD        Current Outpatient Medications   Medication Sig Dispense Refill     acetaminophen (TYLENOL) 325 MG tablet Take 2 tablets (650 mg) by mouth every 4 hours as  needed for mild pain       alendronate (FOSAMAX) 70 MG tablet Take 1 tablet (70 mg) by mouth every 7 days Take 60 minutes before am meal with 8 oz. water. Remain upright for 30 minutes. 12 tablet 4     amLODIPine (NORVASC) 5 MG tablet TAKE 2 TABLETS BY MOUTH EVERY DAY 60 tablet 11     aspirin (ASA) 81 MG chewable tablet Take 1 tablet (81 mg) by mouth daily       calcium carbonate 600 mg-vitamin D 400 units (CALTRATE) 600-400 MG-UNIT per tablet Take 1 tablet by mouth 2 times daily (with meals)       cetirizine (ZYRTEC) 10 MG tablet Take 10 mg by mouth daily       DULoxetine (CYMBALTA) 20 MG EC capsule Take 20 mg by mouth daily       levothyroxine (SYNTHROID/LEVOTHROID) 75 MCG tablet Take 1 tablet (75 mcg) by mouth daily 90 tablet 3     melatonin 3 MG tablet Take 2 tablets (6 mg) by mouth At Bedtime (Patient taking differently: Take 5 mg by mouth nightly as needed )       multivitamin w/minerals (THERA-VIT-M) tablet Take 1 tablet by mouth daily       omeprazole (PRILOSEC) 40 MG DR capsule Take 1 capsule (40 mg) by mouth daily       polyethylene glycol (MIRALAX/GLYCOLAX) powder Take 1 capful by mouth daily       rosuvastatin (CRESTOR) 10 MG tablet TAKE 1 TABLET BY MOUTH EVERY DAY 90 tablet 3     senna (SENOKOT) 8.6 MG tablet Take 2 tablets by mouth 2 times daily as needed for constipation       sirolimus (GENERIC EQUIVALENT) 0.5 MG tablet Take 8 tablets (4 mg) by mouth daily 720 tablet 3     tacrolimus (GENERIC EQUIVALENT) 1 MG capsule Take 2 capsules (2 mg) by mouth every morning AND 2 capsules (2 mg) every evening. 150 capsule 11     albuterol (PROAIR HFA/PROVENTIL HFA/VENTOLIN HFA) 108 (90 Base) MCG/ACT inhaler Inhale 2 puffs into the lungs every 6 hours as needed for shortness of breath / dyspnea or wheezing (Patient not taking: Reported on 9/22/2021)       fluticasone (FLOVENT HFA) 220 MCG/ACT Inhaler Inhale 2 puffs into the lungs 2 times daily (Patient not taking: Reported on 9/22/2021)       ondansetron  (ZOFRAN-ODT) 4 MG ODT tab DISSOLVE 1 TABLET ON THE TONGUE EVERY EIGHT HOURS AS NEEDED (Patient not taking: Reported on 2021)  3     Allergies   Allergen Reactions     Hydromorphone Other (See Comments)     Significant Delirium     Adhesive Tape Blisters     Tegaderm causes bruises, blisters and extreme irritation.     Lisinopril Other (See Comments)     hypotension     Quinolones Other (See Comments)     Would not rx quinolones until QTc interval improved.      Tobramycin Other (See Comments)     Would not use aminoglycosides as his kidney function is very borderline     Codeine Nausea     Social History     Tobacco Use     Smoking status: Former Smoker     Packs/day: 1.00     Years: 25.00     Pack years: 25.00     Start date: 1980     Quit date: 2008     Years since quittin.9     Smokeless tobacco: Never Used   Substance Use Topics     Alcohol use: Yes     Alcohol/week: 1.0 - 2.0 standard drinks     Types: 1 - 2 Cans of beer per week     Drug use: No

## 2021-09-29 ASSESSMENT — ASTHMA QUESTIONNAIRES: ACT_TOTALSCORE: 25

## 2021-10-19 NOTE — PLAN OF CARE
DISCHARGE                         No discharge date for patient encounter.  ----------------------------------------------------------------------------  Discharged to: Home  Via: private transportation  Accompanied by: Family  Discharge Instructions: regular diet, activity as tolerated, medications, follow up appointments, when to call the MD, aftercare instructions.  Prescriptions: To be filled by [facility] pharmacy; medication list reviewed & sent with pt  Follow Up Appointments: arranged; information given  Belongings: All sent with pt  IV: d/c'd  Telemetry: d/c'd  Pt exhibits understanding of above discharge instructions; all questions answered.    Discharge Paperwork: Signed, copied, and sent home with patient.    No

## 2021-10-31 ENCOUNTER — HEALTH MAINTENANCE LETTER (OUTPATIENT)
Age: 56
End: 2021-10-31

## 2021-11-08 ENCOUNTER — LAB (OUTPATIENT)
Dept: LAB | Facility: CLINIC | Age: 56
End: 2021-11-08
Payer: COMMERCIAL

## 2021-11-08 DIAGNOSIS — K51.00 ULCERATIVE PANCOLITIS WITHOUT COMPLICATION (H): ICD-10-CM

## 2021-11-08 PROCEDURE — 83993 ASSAY FOR CALPROTECTIN FECAL: CPT

## 2021-11-10 LAB — CALPROTECTIN STL-MCNT: 199 MG/KG (ref 0–49.9)

## 2021-11-18 DIAGNOSIS — Z94.1 TRANSPLANTED HEART (H): Primary | ICD-10-CM

## 2021-11-30 ENCOUNTER — TELEPHONE (OUTPATIENT)
Dept: TRANSPLANT | Facility: CLINIC | Age: 56
End: 2021-11-30

## 2021-11-30 NOTE — TELEPHONE ENCOUNTER
Per pt [facility] is unable to get Tacro 0.5 mg tablets  Wonders if he can get filled with 1 mg tablets at this time needs Rx called into [facility]

## 2021-11-30 NOTE — TELEPHONE ENCOUNTER
Provider Call: Medication Refill  [facility] # [phone] just ask for any pharmacist They only have Sirolimus 1.0mg in stock Only (He takes 0.5mg) they need the OK to fill 1.0mg and he will need new script (they have a different dosage -they have 3.0mg daily) and he is showing on his med list 4.0mg daily    When will the patient be out of this medication?: Less than 3 days (Route high priority)  Callback needed? Yes    Return Call Needed  Same as documented in contacts section  When to return call?: Same day: Route High Priority

## 2021-12-01 ENCOUNTER — TELEPHONE (OUTPATIENT)
Dept: GASTROENTEROLOGY | Facility: CLINIC | Age: 56
End: 2021-12-01
Payer: COMMERCIAL

## 2021-12-01 DIAGNOSIS — K51.00 ULCERATIVE PANCOLITIS WITHOUT COMPLICATION (H): Primary | ICD-10-CM

## 2021-12-01 DIAGNOSIS — Z94.1 TRANSPLANTED HEART (H): ICD-10-CM

## 2021-12-01 RX ORDER — SIROLIMUS 1 MG/1
4 TABLET, FILM COATED ORAL DAILY
Qty: 360 TABLET | Refills: 3 | Status: SHIPPED | OUTPATIENT
Start: 2021-12-01 | End: 2021-12-13

## 2021-12-08 DIAGNOSIS — M80.00XD AGE-RELATED OSTEOPOROSIS WITH CURRENT PATHOLOGICAL FRACTURE WITH ROUTINE HEALING, SUBSEQUENT ENCOUNTER: ICD-10-CM

## 2021-12-08 DIAGNOSIS — Z92.241 HISTORY OF CORTICOSTEROID THERAPY: ICD-10-CM

## 2021-12-08 DIAGNOSIS — R79.89 LOW TSH LEVEL: ICD-10-CM

## 2021-12-08 DIAGNOSIS — E03.2 HYPOTHYROIDISM DUE TO MEDICATION: Chronic | ICD-10-CM

## 2021-12-09 RX ORDER — ALENDRONATE SODIUM 70 MG/1
TABLET ORAL
Qty: 4 TABLET | Refills: 14 | OUTPATIENT
Start: 2021-12-09

## 2021-12-09 NOTE — TELEPHONE ENCOUNTER
" ALENDRONATE SODIUM 70 MG TAB    Last Written Prescription Date:  11/24/20  Last Fill Quantity: 12,   # refills: 4  Last Office Visit : 10/22/2019  Future Office visit:  None     Pt no longer under provider- Dr. Rodriguez  PCP to refill: see RF note 1-14-21     PCP listed as : Fredrick Wolff MD    [address] [place] [address]    [phone] (Fax    12/30/20 10:09 AM  Note     Pt states he has appointment with PCP on Monday to manage his thyroid issues. Confirms understanding that one provider should be managing thyroid and states: \"It's gonna be my primary\".  Jayda Bradley RN on 12/30/2020 at 10:07 AM           "

## 2021-12-13 ENCOUNTER — TELEPHONE (OUTPATIENT)
Dept: TRANSPLANT | Facility: CLINIC | Age: 56
End: 2021-12-13
Payer: COMMERCIAL

## 2021-12-13 DIAGNOSIS — Z94.1 TRANSPLANTED HEART (H): ICD-10-CM

## 2021-12-13 RX ORDER — SIROLIMUS 1 MG/1
4 TABLET, FILM COATED ORAL DAILY
Qty: 360 TABLET | Refills: 3 | Status: SHIPPED | OUTPATIENT
Start: 2021-12-13 | End: 2022-11-21

## 2021-12-13 NOTE — TELEPHONE ENCOUNTER
Call returned. Pt requesting rapa prescription be sent to local [facility]. Pt is overdue for level. Pt states he needs to take work off in order to get labs due to timing of his work schedule. Writer suggested going to [place] on a Saturday. Number provided.

## 2021-12-14 DIAGNOSIS — R11.0 NAUSEA: ICD-10-CM

## 2021-12-14 RX ORDER — OMEPRAZOLE 40 MG/1
40 CAPSULE, DELAYED RELEASE ORAL DAILY
Qty: 90 CAPSULE | Refills: 3 | Status: SHIPPED | OUTPATIENT
Start: 2021-12-14 | End: 2022-10-19

## 2021-12-14 NOTE — TELEPHONE ENCOUNTER
Refill Request  Medication name: Pending Prescriptions:                       Disp   Refills    omeprazole (PRILOSEC) 40 MG DR capsule    90 cap*3            Sig: Take 1 capsule (40 mg) by mouth daily    Who prescribed the medication: New  Last refill on medication: 11/20/21  Requested Pharmacy: [facility]  Last appointment with PCP: 09/28/21  Next appointment: Not due

## 2021-12-19 ENCOUNTER — LAB (OUTPATIENT)
Dept: LAB | Facility: CLINIC | Age: 56
End: 2021-12-19
Payer: COMMERCIAL

## 2021-12-19 DIAGNOSIS — E03.9 HYPOTHYROIDISM, UNSPECIFIED TYPE: ICD-10-CM

## 2021-12-19 DIAGNOSIS — Z94.1 TRANSPLANTED HEART (H): ICD-10-CM

## 2021-12-19 LAB — TSH SERPL DL<=0.005 MIU/L-ACNC: 0.14 MU/L (ref 0.4–4)

## 2021-12-19 PROCEDURE — 80197 ASSAY OF TACROLIMUS: CPT

## 2021-12-19 PROCEDURE — 84443 ASSAY THYROID STIM HORMONE: CPT

## 2021-12-19 PROCEDURE — 36415 COLL VENOUS BLD VENIPUNCTURE: CPT

## 2021-12-20 LAB
TACROLIMUS BLD-MCNC: 3.9 UG/L (ref 5–15)
TME LAST DOSE: ABNORMAL H
TME LAST DOSE: ABNORMAL H

## 2021-12-21 DIAGNOSIS — Z94.1 TRANSPLANTED HEART (H): Primary | ICD-10-CM

## 2021-12-22 ENCOUNTER — TELEPHONE (OUTPATIENT)
Dept: GASTROENTEROLOGY | Facility: CLINIC | Age: 56
End: 2021-12-22
Payer: COMMERCIAL

## 2021-12-22 NOTE — TELEPHONE ENCOUNTER
CECY pt to schedule for tele-visit with Dr. Pak in April 2022 after his colonoscopy and EGD. Sent [facility].

## 2021-12-26 ENCOUNTER — LAB (OUTPATIENT)
Dept: LAB | Facility: CLINIC | Age: 56
End: 2021-12-26
Payer: COMMERCIAL

## 2021-12-26 DIAGNOSIS — Z94.1 TRANSPLANTED HEART (H): ICD-10-CM

## 2021-12-26 LAB
ANION GAP SERPL CALCULATED.3IONS-SCNC: 4 MMOL/L (ref 3–14)
BUN SERPL-MCNC: 29 MG/DL (ref 7–30)
CALCIUM SERPL-MCNC: 8.6 MG/DL (ref 8.5–10.1)
CHLORIDE BLD-SCNC: 109 MMOL/L (ref 94–109)
CO2 SERPL-SCNC: 25 MMOL/L (ref 20–32)
CREAT SERPL-MCNC: 1.7 MG/DL (ref 0.66–1.25)
ERYTHROCYTE [DISTWIDTH] IN BLOOD BY AUTOMATED COUNT: 15.5 % (ref 10–15)
GFR SERPL CREATININE-BSD FRML MDRD: 46 ML/MIN/1.73M2
GLUCOSE BLD-MCNC: 107 MG/DL (ref 70–99)
HCT VFR BLD AUTO: 36.3 % (ref 40–53)
HGB BLD-MCNC: 11.8 G/DL (ref 13.3–17.7)
MCH RBC QN AUTO: 24.9 PG (ref 26.5–33)
MCHC RBC AUTO-ENTMCNC: 32.5 G/DL (ref 31.5–36.5)
MCV RBC AUTO: 77 FL (ref 78–100)
PLATELET # BLD AUTO: 212 10E3/UL (ref 150–450)
POTASSIUM BLD-SCNC: 4.1 MMOL/L (ref 3.4–5.3)
RBC # BLD AUTO: 4.74 10E6/UL (ref 4.4–5.9)
SODIUM SERPL-SCNC: 138 MMOL/L (ref 133–144)
WBC # BLD AUTO: 5.7 10E3/UL (ref 4–11)

## 2021-12-26 PROCEDURE — 80197 ASSAY OF TACROLIMUS: CPT

## 2021-12-26 PROCEDURE — 80048 BASIC METABOLIC PNL TOTAL CA: CPT

## 2021-12-26 PROCEDURE — 85027 COMPLETE CBC AUTOMATED: CPT

## 2021-12-26 PROCEDURE — 36415 COLL VENOUS BLD VENIPUNCTURE: CPT

## 2021-12-27 ENCOUNTER — TELEPHONE (OUTPATIENT)
Dept: INTERNAL MEDICINE | Facility: CLINIC | Age: 56
End: 2021-12-27
Payer: COMMERCIAL

## 2021-12-27 DIAGNOSIS — Z94.1 HEART REPLACED BY TRANSPLANT (H): ICD-10-CM

## 2021-12-27 DIAGNOSIS — E03.9 HYPOTHYROIDISM, UNSPECIFIED TYPE: Primary | ICD-10-CM

## 2021-12-27 LAB
TACROLIMUS BLD-MCNC: 6 UG/L (ref 5–15)
TME LAST DOSE: NORMAL H
TME LAST DOSE: NORMAL H

## 2021-12-27 RX ORDER — TACROLIMUS 1 MG/1
CAPSULE ORAL
Qty: 150 CAPSULE | Refills: 11 | Status: SHIPPED | OUTPATIENT
Start: 2021-12-27 | End: 2022-02-07

## 2021-12-27 NOTE — TELEPHONE ENCOUNTER
Contact patient.  On December 19 he had a TSH check that was unchanged at 0.14.  This is still mildly hyperthyroid.    Patient has been on 75 mcg dose of levothyroxine since August.    Have patient reduce his levothyroxine dose slightly by taking a half a pill once a week, otherwise continue on the 75 mcg dose of levothyroxine another 6 days of the week.    Have him recheck his TSH in 2 to 3 months, he can combine this when he has other lab work for his transplant monitoring.

## 2022-01-06 ENCOUNTER — TELEPHONE (OUTPATIENT)
Dept: INTERNAL MEDICINE | Facility: CLINIC | Age: 57
End: 2022-01-06
Payer: COMMERCIAL

## 2022-01-06 ENCOUNTER — TELEPHONE (OUTPATIENT)
Dept: GASTROENTEROLOGY | Facility: CLINIC | Age: 57
End: 2022-01-06
Payer: COMMERCIAL

## 2022-01-06 NOTE — TELEPHONE ENCOUNTER
Screening Questions  1. Are you active on mychart?Y    2. What insurance is in the chart? [employer]      2.  Ordering/Referring Provider: Laurence Pak    3. BMI 25.1, If greater than 40 review exclusion criteria also will need EXTENDED PREP    4.  Respiratory Screening (If yes to any of the following HOSPITAL setting only):     Do you use daily home oxygen? n  Do you have mod to severe Obstructive Sleep Apnea? n   Do you have Pulmonary Hypertension? n   Do you have UNCONTROLLED asthma? n    5. Have you had a heart or lung transplant? y (If yes, please review exclusion criteria) Heart Transplant    6. Are you currently on dialysis?n  (If yes, schedule in HOSPITAL setting only)(If yes, please send Golytely prep)    7. Do you have chronic kidney disease? n (If yes, please send Golytely prep)    7. Have you had a stroke or Transient ischemic attack (TIA) within 6 months? n (If yes, do not schedule at [facility])    8. In the past 6 months, have you had any heart related issues including cardiomyopathy or heart attack? n (If yes, please review exclusion criteria)           If yes, did it require cardiac stenting or other implantable device?n  (If yes, please review exclusion criteria)      9. Do you have any implantable devices in your body (pacemaker, defib, LVAD)? n (If yes, schedule at [facility])    10. Do you take nitroglycerin? If yes, how often? n (if yes, schedule at HOSPITAL setting)    11. Are you currently taking any blood thinners?n (If yes- inform patient to follow up with PCP or provider for follow up instructions)     12. Are you a diabetic? n (If yes, please send Golytely prep)    13. (Females) Are you currently pregnant?   If yes, how many weeks?      15. Are you taking any prescription pain medications on a routine schedule? n If yes, MAC sedation and patient will need EXTENDED PREP.    16. Do you have any chemical dependencies such as alcohol, street drugs, or methadone? n If yes, MAC sedation.    17.  Do you have any history of post-traumatic stress syndrome, severe anxiety or history of psychosis? n  If yes, MAC sedation.     18. Do you transfer independently? y    19.  Do you have any issues with constipation? n   If yes, pt will need EXTENDED PREP     20. Preferred [facility] for Pre [facility]cription [id number] [place] [facility] [address]        Additional comments: Pt wants to wait until April to schedule and wants a Monday at [facility] with MAC and we only have Thursday's right now with scheduled MAC time. He will call back in a few weeks to see if those times have opened up yet.

## 2022-01-06 NOTE — TELEPHONE ENCOUNTER
Reason for Call:  Same Day Appointment, Requested Provider:     PCP: Fredrick Wolff    Reason for visit: soreness in tear duct in eye, with redness- doesn't look like pink eye per patient.    Duration of symptoms:  3 weeks.    Have you been treated for this in the past? No    Additional comments: asking if Dr Wolff could fit him in 1/13 in the afternoon  Can we leave a detailed message on this number? YES    Phone number patient can be reached at: Home number on file [phone] (home)    Best Time: anytime before 6 pm  Call taken on 1/6/2022 at 8:20 AM by Jammie Goodman

## 2022-01-06 NOTE — TELEPHONE ENCOUNTER
I called pt, he wanted to come in next week on 1/13 in the afternoon only. Pt notified Dr Wolff doesn't have any openings in the afternoon that day. Pt states he will go to [facility]. Nothing further needed at this time.

## 2022-01-09 ENCOUNTER — LAB (OUTPATIENT)
Dept: LAB | Facility: CLINIC | Age: 57
End: 2022-01-09
Payer: COMMERCIAL

## 2022-01-09 DIAGNOSIS — Z94.1 TRANSPLANTED HEART (H): ICD-10-CM

## 2022-01-09 LAB
ANION GAP SERPL CALCULATED.3IONS-SCNC: 6 MMOL/L (ref 3–14)
BUN SERPL-MCNC: 28 MG/DL (ref 7–30)
CALCIUM SERPL-MCNC: 8.3 MG/DL (ref 8.5–10.1)
CHLORIDE BLD-SCNC: 108 MMOL/L (ref 94–109)
CO2 SERPL-SCNC: 25 MMOL/L (ref 20–32)
CREAT SERPL-MCNC: 1.8 MG/DL (ref 0.66–1.25)
ERYTHROCYTE [DISTWIDTH] IN BLOOD BY AUTOMATED COUNT: 15.7 % (ref 10–15)
GFR SERPL CREATININE-BSD FRML MDRD: 43 ML/MIN/1.73M2
GLUCOSE BLD-MCNC: 109 MG/DL (ref 70–99)
HCT VFR BLD AUTO: 35.2 % (ref 40–53)
HGB BLD-MCNC: 11.4 G/DL (ref 13.3–17.7)
MCH RBC QN AUTO: 24.6 PG (ref 26.5–33)
MCHC RBC AUTO-ENTMCNC: 32.4 G/DL (ref 31.5–36.5)
MCV RBC AUTO: 76 FL (ref 78–100)
PLATELET # BLD AUTO: 215 10E3/UL (ref 150–450)
POTASSIUM BLD-SCNC: 4.2 MMOL/L (ref 3.4–5.3)
RBC # BLD AUTO: 4.63 10E6/UL (ref 4.4–5.9)
SODIUM SERPL-SCNC: 139 MMOL/L (ref 133–144)
WBC # BLD AUTO: 5.2 10E3/UL (ref 4–11)

## 2022-01-09 PROCEDURE — 85027 COMPLETE CBC AUTOMATED: CPT

## 2022-01-09 PROCEDURE — 80197 ASSAY OF TACROLIMUS: CPT

## 2022-01-09 PROCEDURE — 80048 BASIC METABOLIC PNL TOTAL CA: CPT

## 2022-01-09 PROCEDURE — 80195 ASSAY OF SIROLIMUS: CPT

## 2022-01-09 PROCEDURE — 36415 COLL VENOUS BLD VENIPUNCTURE: CPT

## 2022-01-10 LAB
SIROLIMUS BLD-MCNC: 3.2 UG/L (ref 5–15)
TME LAST DOSE: ABNORMAL H
TME LAST DOSE: ABNORMAL H

## 2022-01-18 ENCOUNTER — TELEPHONE (OUTPATIENT)
Dept: GASTROENTEROLOGY | Facility: CLINIC | Age: 57
End: 2022-01-18
Payer: COMMERCIAL

## 2022-01-18 NOTE — TELEPHONE ENCOUNTER
Screening Questions  Blue=prep questions Red=location Green=sedation   1. Are you active on mychart? Y    2. What insurance is in the chart? [employer]     3.  Ordering/Referring Provider: Laurence Pak MD    4. BMI 28.5, If greater than 40 review exclusion criteria also will need EXTENDED PREP    5.  Respiratory Screening (If yes to any of the following HOSPITAL setting only):     Do you use daily home oxygen? N  Do you have mod to severe Obstructive Sleep Apnea? N   Do you have Pulmonary Hypertension? N   Do you have UNCONTROLLED asthma? N    6. Have you had a heart or lung transplant? Y 2/24/2019  (If yes, please review exclusion criteria)    7. Are you currently on dialysis?N  (If yes, schedule in HOSPITAL setting only)(If yes, please send Golytely prep)    8. Do you have chronic kidney disease? N (If yes, please send Golytely prep)    9. Have you had a stroke or Transient ischemic attack (TIA) within 6 months? N (If yes, do not schedule at [facility])    10. In the past 6 months, have you had any heart related issues including cardiomyopathy or heart attack? N (If yes, please review exclusion criteria)           If yes, did it require cardiac stenting or other implantable device?  (If yes, please review exclusion criteria)      11. Do you have any implantable devices in your body (pacemaker, defib, LVAD)? N (If yes, schedule at [facility])    12. Do you take nitroglycerin? If yes, how often? N (if yes, schedule at HOSPITAL setting)    13. Are you currently taking any blood thinners?N (If yes- inform patient to follow up with PCP or provider for follow up instructions)     14. Are you a diabetic? N (If yes, please send Golytely prep)    15. (Females) Are you currently pregnant?   If yes, how many weeks?      16. Are you taking any prescription pain medications on a routine schedule? N If yes, MAC sedation and patient will need EXTENDED PREP.    17. Do you have any chemical dependencies such as alcohol, street drugs, or  methadone? N If yes, MAC sedation     18. Do you have any history of post-traumatic stress syndrome, severe anxiety or history of psychosis? N  If yes, MAC sedation.     19. Do you transfer independently? Y    20.  Do you have any issues with constipation? N   If yes, pt will need EXTENDED PREP     21. Preferred Pharmacy for Pre Prescription [facility]    Scheduling Details    Which Colonoscopy Prep was Sent?: MPREP   Type of Procedure Scheduled: DOUBLE  Surgeon: lynsey  Date of Procedure: 3/8  Location: [facility]  Caller (Please ask for phone number if not scheduled by patient):       Sedation Type: MAC  Conscious Sedation- Needs  for 6 hours after the procedure  MAC/General-Needs  for 24 hours after procedure    Pre-op Required at [facility] [place], [place] and OR for MAC sedation: Y  (if yes advise patient they will need a pre-op prior to procedure)      Informed patient they will need an adult  Y  Cannot take any type of public or medical transportation alone    Pre-Procedure Covid test to be completed at [facility] or Externally: 3/4    Confirmed Nurse will call to complete assessment Y    Additional comments:  (DE [facility]'S PATIENTS NEED EXTENDED PREP)

## 2022-01-22 ENCOUNTER — LAB (OUTPATIENT)
Dept: LAB | Facility: CLINIC | Age: 57
End: 2022-01-22
Payer: COMMERCIAL

## 2022-01-22 DIAGNOSIS — Z94.1 TRANSPLANTED HEART (H): ICD-10-CM

## 2022-01-22 DIAGNOSIS — E03.9 HYPOTHYROIDISM, UNSPECIFIED TYPE: ICD-10-CM

## 2022-01-22 LAB — TSH SERPL DL<=0.005 MIU/L-ACNC: 0.21 MU/L (ref 0.4–4)

## 2022-01-22 PROCEDURE — 80195 ASSAY OF SIROLIMUS: CPT

## 2022-01-22 PROCEDURE — 80197 ASSAY OF TACROLIMUS: CPT

## 2022-01-22 PROCEDURE — 36415 COLL VENOUS BLD VENIPUNCTURE: CPT

## 2022-01-22 PROCEDURE — 84443 ASSAY THYROID STIM HORMONE: CPT

## 2022-01-23 LAB
SIROLIMUS BLD-MCNC: 5.4 UG/L (ref 5–15)
TACROLIMUS BLD-MCNC: 5.9 UG/L (ref 5–15)
TME LAST DOSE: NORMAL H

## 2022-02-05 DIAGNOSIS — Z94.1 HEART REPLACED BY TRANSPLANT (H): ICD-10-CM

## 2022-02-07 DIAGNOSIS — Z11.59 ENCOUNTER FOR SCREENING FOR OTHER VIRAL DISEASES: Primary | ICD-10-CM

## 2022-02-07 RX ORDER — TACROLIMUS 1 MG/1
CAPSULE ORAL
Qty: 150 CAPSULE | Refills: 11 | Status: SHIPPED | OUTPATIENT
Start: 2022-02-07 | End: 2022-02-28

## 2022-02-08 DIAGNOSIS — F34.1 DYSTHYMIA: Primary | ICD-10-CM

## 2022-02-08 RX ORDER — DULOXETIN HYDROCHLORIDE 20 MG/1
20 CAPSULE, DELAYED RELEASE ORAL DAILY
Qty: 90 CAPSULE | Refills: 3 | Status: SHIPPED | OUTPATIENT
Start: 2022-02-08 | End: 2023-02-22

## 2022-02-08 NOTE — TELEPHONE ENCOUNTER
Refill Request  Medication name: Pending Prescriptions:                       Disp   Refills    DULoxetine (CYMBALTA) 20 MG capsule                           Sig: Take 1 capsule (20 mg) by mouth daily    Who prescribed the medication: New  Last refill on medication: 01/15/22  [facility]  Last appointment with PCP: 06/22/21  Next appointment: Not due

## 2022-02-11 DIAGNOSIS — Z11.59 ENCOUNTER FOR SCREENING FOR OTHER VIRAL DISEASES: Primary | ICD-10-CM

## 2022-02-19 ENCOUNTER — LAB (OUTPATIENT)
Dept: FAMILY MEDICINE | Facility: CLINIC | Age: 57
End: 2022-02-19
Attending: INTERNAL MEDICINE
Payer: COMMERCIAL

## 2022-02-19 DIAGNOSIS — Z11.59 ENCOUNTER FOR SCREENING FOR OTHER VIRAL DISEASES: ICD-10-CM

## 2022-02-19 PROCEDURE — U0003 INFECTIOUS AGENT DETECTION BY NUCLEIC ACID (DNA OR RNA); SEVERE ACUTE RESPIRATORY SYNDROME CORONAVIRUS 2 (SARS-COV-2) (CORONAVIRUS DISEASE [COVID-19]), AMPLIFIED PROBE TECHNIQUE, MAKING USE OF HIGH THROUGHPUT TECHNOLOGIES AS DESCRIBED BY CMS-2020-01-R: HCPCS

## 2022-02-19 PROCEDURE — U0005 INFEC AGEN DETEC AMPLI PROBE: HCPCS

## 2022-02-20 ENCOUNTER — HEALTH MAINTENANCE LETTER (OUTPATIENT)
Age: 57
End: 2022-02-20

## 2022-02-20 LAB — SARS-COV-2 RNA RESP QL NAA+PROBE: NEGATIVE

## 2022-02-21 DIAGNOSIS — Z94.1 TRANSPLANTED HEART (H): Primary | ICD-10-CM

## 2022-02-21 DIAGNOSIS — Z12.5 PROSTATE CANCER SCREENING: ICD-10-CM

## 2022-02-21 DIAGNOSIS — Z13.1 DIABETES MELLITUS SCREENING: ICD-10-CM

## 2022-02-21 DIAGNOSIS — Z13.220 SCREENING CHOLESTEROL LEVEL: ICD-10-CM

## 2022-02-21 DIAGNOSIS — Z13.29 THYROID DISORDER SCREENING: ICD-10-CM

## 2022-02-21 RX ORDER — ASPIRIN 81 MG/1
243 TABLET, CHEWABLE ORAL ONCE
Status: CANCELLED | OUTPATIENT
Start: 2022-02-21

## 2022-02-21 RX ORDER — SODIUM CHLORIDE 9 MG/ML
INJECTION, SOLUTION INTRAVENOUS CONTINUOUS
Status: CANCELLED | OUTPATIENT
Start: 2022-02-21

## 2022-02-21 RX ORDER — LIDOCAINE 40 MG/G
CREAM TOPICAL
Status: CANCELLED | OUTPATIENT
Start: 2022-02-21

## 2022-02-21 RX ORDER — ASPIRIN 325 MG
325 TABLET ORAL ONCE
Status: CANCELLED | OUTPATIENT
Start: 2022-02-21 | End: 2022-02-21

## 2022-02-22 ENCOUNTER — TELEPHONE (OUTPATIENT)
Dept: CARDIOLOGY | Facility: CLINIC | Age: 57
End: 2022-02-22
Payer: COMMERCIAL

## 2022-02-22 NOTE — TELEPHONE ENCOUNTER
Left voicemail for patient to complete Travel Screen for Cardiac Cath Lab appointment on 2/23 and inform patient of updated Visitor Policy.       covid neg

## 2022-02-23 ENCOUNTER — HOSPITAL ENCOUNTER (OUTPATIENT)
Facility: CLINIC | Age: 57
Discharge: HOME | End: 2022-02-23
Attending: INTERNAL MEDICINE | Admitting: INTERNAL MEDICINE
Payer: COMMERCIAL

## 2022-02-23 ENCOUNTER — APPOINTMENT (OUTPATIENT)
Dept: LAB | Facility: CLINIC | Age: 57
End: 2022-02-23
Attending: INTERNAL MEDICINE
Payer: COMMERCIAL

## 2022-02-23 ENCOUNTER — HOSPITAL ENCOUNTER (OUTPATIENT)
Dept: GENERAL RADIOLOGY | Facility: CLINIC | Age: 57
End: 2022-02-23
Attending: INTERNAL MEDICINE | Admitting: INTERNAL MEDICINE
Payer: COMMERCIAL

## 2022-02-23 ENCOUNTER — APPOINTMENT (OUTPATIENT)
Dept: MEDSURG UNIT | Facility: CLINIC | Age: 57
End: 2022-02-23
Attending: INTERNAL MEDICINE
Payer: COMMERCIAL

## 2022-02-23 ENCOUNTER — HOSPITAL ENCOUNTER (OUTPATIENT)
Dept: MRI IMAGING | Facility: CLINIC | Age: 57
End: 2022-02-23
Attending: INTERNAL MEDICINE | Admitting: INTERNAL MEDICINE
Payer: COMMERCIAL

## 2022-02-23 VITALS
OXYGEN SATURATION: 98 % | RESPIRATION RATE: 16 BRPM | SYSTOLIC BLOOD PRESSURE: 159 MMHG | HEART RATE: 101 BPM | DIASTOLIC BLOOD PRESSURE: 97 MMHG

## 2022-02-23 VITALS
HEIGHT: 68 IN | RESPIRATION RATE: 16 BRPM | OXYGEN SATURATION: 98 % | TEMPERATURE: 98.1 F | SYSTOLIC BLOOD PRESSURE: 135 MMHG | WEIGHT: 175 LBS | HEART RATE: 94 BPM | BODY MASS INDEX: 26.52 KG/M2 | DIASTOLIC BLOOD PRESSURE: 95 MMHG

## 2022-02-23 DIAGNOSIS — Z13.29 THYROID DISORDER SCREENING: ICD-10-CM

## 2022-02-23 DIAGNOSIS — T86.290 VASCULOPATHY OF CARDIAC ALLOGRAFT (H): Primary | ICD-10-CM

## 2022-02-23 DIAGNOSIS — Z94.1 TRANSPLANTED HEART (H): ICD-10-CM

## 2022-02-23 DIAGNOSIS — Z13.1 DIABETES MELLITUS SCREENING: ICD-10-CM

## 2022-02-23 DIAGNOSIS — Z12.5 PROSTATE CANCER SCREENING: ICD-10-CM

## 2022-02-23 DIAGNOSIS — Z13.220 SCREENING CHOLESTEROL LEVEL: ICD-10-CM

## 2022-02-23 LAB
ALBUMIN SERPL-MCNC: 3.2 G/DL (ref 3.4–5)
ALP SERPL-CCNC: 113 U/L (ref 40–150)
ALT SERPL W P-5'-P-CCNC: 31 U/L (ref 0–70)
ANION GAP SERPL CALCULATED.3IONS-SCNC: 9 MMOL/L (ref 3–14)
AST SERPL W P-5'-P-CCNC: 24 U/L (ref 0–45)
BASOPHILS # BLD AUTO: 0 10E3/UL (ref 0–0.2)
BASOPHILS NFR BLD AUTO: 0 %
BILIRUB SERPL-MCNC: 0.4 MG/DL (ref 0.2–1.3)
BUN SERPL-MCNC: 31 MG/DL (ref 7–30)
CALCIUM SERPL-MCNC: 9.1 MG/DL (ref 8.5–10.1)
CHLORIDE BLD-SCNC: 108 MMOL/L (ref 94–109)
CHOLEST SERPL-MCNC: 282 MG/DL
CK SERPL-CCNC: 230 U/L (ref 30–300)
CMV DNA SPEC NAA+PROBE-ACNC: NOT DETECTED IU/ML
CO2 SERPL-SCNC: 22 MMOL/L (ref 20–32)
CREAT SERPL-MCNC: 1.94 MG/DL (ref 0.66–1.25)
EOSINOPHIL # BLD AUTO: 0.1 10E3/UL (ref 0–0.7)
EOSINOPHIL NFR BLD AUTO: 3 %
ERYTHROCYTE [DISTWIDTH] IN BLOOD BY AUTOMATED COUNT: 15.4 % (ref 10–15)
FASTING STATUS PATIENT QL REPORTED: YES
GFR SERPL CREATININE-BSD FRML MDRD: 39 ML/MIN/1.73M2
GLUCOSE BLD-MCNC: 92 MG/DL (ref 70–99)
HBA1C MFR BLD: 5.5 % (ref 0–5.6)
HCT VFR BLD AUTO: 37.9 % (ref 40–53)
HDLC SERPL-MCNC: 96 MG/DL
HGB BLD-MCNC: 11.6 G/DL (ref 13.3–17.7)
HGB BLD-MCNC: 12 G/DL (ref 13.3–17.7)
HGB BLD-MCNC: 12.1 G/DL (ref 13.3–17.7)
IMM GRANULOCYTES # BLD: 0 10E3/UL
IMM GRANULOCYTES NFR BLD: 0 %
LDLC SERPL CALC-MCNC: 163 MG/DL
LYMPHOCYTES # BLD AUTO: 1.8 10E3/UL (ref 0.8–5.3)
LYMPHOCYTES NFR BLD AUTO: 36 %
MAGNESIUM SERPL-MCNC: 1.8 MG/DL (ref 1.6–2.3)
MCH RBC QN AUTO: 24.5 PG (ref 26.5–33)
MCHC RBC AUTO-ENTMCNC: 31.9 G/DL (ref 31.5–36.5)
MCV RBC AUTO: 77 FL (ref 78–100)
MONOCYTES # BLD AUTO: 0.6 10E3/UL (ref 0–1.3)
MONOCYTES NFR BLD AUTO: 11 %
NEUTROPHILS # BLD AUTO: 2.5 10E3/UL (ref 1.6–8.3)
NEUTROPHILS NFR BLD AUTO: 50 %
NONHDLC SERPL-MCNC: 186 MG/DL
NRBC # BLD AUTO: 0 10E3/UL
NRBC BLD AUTO-RTO: 0 /100
OXYHGB MFR BLDV: 71 % (ref 92–100)
OXYHGB MFR BLDV: 91 % (ref 92–100)
PHOSPHATE SERPL-MCNC: 3.3 MG/DL (ref 2.5–4.5)
PLATELET # BLD AUTO: 204 10E3/UL (ref 150–450)
POTASSIUM BLD-SCNC: 3.6 MMOL/L (ref 3.4–5.3)
PROT SERPL-MCNC: 7.8 G/DL (ref 6.8–8.8)
PSA SERPL-MCNC: 0.69 UG/L (ref 0–4)
RBC # BLD AUTO: 4.93 10E6/UL (ref 4.4–5.9)
SIROLIMUS BLD-MCNC: 6.3 UG/L (ref 5–15)
SODIUM SERPL-SCNC: 139 MMOL/L (ref 133–144)
T4 FREE SERPL-MCNC: 1.06 NG/DL (ref 0.76–1.46)
TACROLIMUS BLD-MCNC: 5.3 UG/L (ref 5–15)
TME LAST DOSE: NORMAL H
TRIGL SERPL-MCNC: 114 MG/DL
TSH SERPL DL<=0.005 MIU/L-ACNC: 0.33 MU/L (ref 0.4–4)
WBC # BLD AUTO: 5.1 10E3/UL (ref 4–11)

## 2022-02-23 PROCEDURE — 250N000011 HC RX IP 250 OP 636: Performed by: PHYSICIAN ASSISTANT

## 2022-02-23 PROCEDURE — 250N000013 HC RX MED GY IP 250 OP 250 PS 637: Performed by: INTERNAL MEDICINE

## 2022-02-23 PROCEDURE — 71046 X-RAY EXAM CHEST 2 VIEWS: CPT

## 2022-02-23 PROCEDURE — 999N000134 HC STATISTIC PP CARE STAGE 3

## 2022-02-23 PROCEDURE — 75563 CARD MRI W/STRESS IMG & DYE: CPT | Mod: 26 | Performed by: INTERNAL MEDICINE

## 2022-02-23 PROCEDURE — 93456 R HRT CORONARY ARTERY ANGIO: CPT | Performed by: INTERNAL MEDICINE

## 2022-02-23 PROCEDURE — 250N000009 HC RX 250: Performed by: INTERNAL MEDICINE

## 2022-02-23 PROCEDURE — 80197 ASSAY OF TACROLIMUS: CPT | Performed by: INTERNAL MEDICINE

## 2022-02-23 PROCEDURE — 99152 MOD SED SAME PHYS/QHP 5/>YRS: CPT | Performed by: INTERNAL MEDICINE

## 2022-02-23 PROCEDURE — 80053 COMPREHEN METABOLIC PANEL: CPT | Performed by: INTERNAL MEDICINE

## 2022-02-23 PROCEDURE — 84439 ASSAY OF FREE THYROXINE: CPT | Performed by: INTERNAL MEDICINE

## 2022-02-23 PROCEDURE — 999N000054 HC STATISTIC EKG NON-CHARGEABLE

## 2022-02-23 PROCEDURE — 85018 HEMOGLOBIN: CPT | Mod: 91

## 2022-02-23 PROCEDURE — 87799 DETECT AGENT NOS DNA QUANT: CPT | Performed by: INTERNAL MEDICINE

## 2022-02-23 PROCEDURE — 86352 CELL FUNCTION ASSAY W/STIM: CPT | Performed by: INTERNAL MEDICINE

## 2022-02-23 PROCEDURE — 93017 CV STRESS TEST TRACING ONLY: CPT

## 2022-02-23 PROCEDURE — 36415 COLL VENOUS BLD VENIPUNCTURE: CPT | Performed by: INTERNAL MEDICINE

## 2022-02-23 PROCEDURE — 83735 ASSAY OF MAGNESIUM: CPT | Performed by: INTERNAL MEDICINE

## 2022-02-23 PROCEDURE — C1894 INTRO/SHEATH, NON-LASER: HCPCS | Performed by: INTERNAL MEDICINE

## 2022-02-23 PROCEDURE — 255N000002 HC RX 255 OP 636: Performed by: INTERNAL MEDICINE

## 2022-02-23 PROCEDURE — 250N000011 HC RX IP 250 OP 636: Performed by: INTERNAL MEDICINE

## 2022-02-23 PROCEDURE — 75563 CARD MRI W/STRESS IMG & DYE: CPT

## 2022-02-23 PROCEDURE — 83036 HEMOGLOBIN GLYCOSYLATED A1C: CPT | Mod: GZ | Performed by: INTERNAL MEDICINE

## 2022-02-23 PROCEDURE — 82810 BLOOD GASES O2 SAT ONLY: CPT

## 2022-02-23 PROCEDURE — 80061 LIPID PANEL: CPT | Performed by: INTERNAL MEDICINE

## 2022-02-23 PROCEDURE — 93016 CV STRESS TEST SUPVJ ONLY: CPT

## 2022-02-23 PROCEDURE — 272N000001 HC OR GENERAL SUPPLY STERILE: Performed by: INTERNAL MEDICINE

## 2022-02-23 PROCEDURE — G0103 PSA SCREENING: HCPCS | Performed by: INTERNAL MEDICINE

## 2022-02-23 PROCEDURE — 93005 ELECTROCARDIOGRAM TRACING: CPT

## 2022-02-23 PROCEDURE — 999N000142 HC STATISTIC PROCEDURE PREP ONLY

## 2022-02-23 PROCEDURE — 80195 ASSAY OF SIROLIMUS: CPT | Performed by: INTERNAL MEDICINE

## 2022-02-23 PROCEDURE — 96365 THER/PROPH/DIAG IV INF INIT: CPT

## 2022-02-23 PROCEDURE — 93010 ELECTROCARDIOGRAM REPORT: CPT | Mod: 76 | Performed by: INTERNAL MEDICINE

## 2022-02-23 PROCEDURE — 84100 ASSAY OF PHOSPHORUS: CPT | Performed by: INTERNAL MEDICINE

## 2022-02-23 PROCEDURE — A9585 GADOBUTROL INJECTION: HCPCS | Performed by: INTERNAL MEDICINE

## 2022-02-23 PROCEDURE — 71046 X-RAY EXAM CHEST 2 VIEWS: CPT | Mod: 26 | Performed by: RADIOLOGY

## 2022-02-23 PROCEDURE — 258N000003 HC RX IP 258 OP 636: Performed by: INTERNAL MEDICINE

## 2022-02-23 PROCEDURE — 85025 COMPLETE CBC W/AUTO DIFF WBC: CPT | Performed by: INTERNAL MEDICINE

## 2022-02-23 PROCEDURE — 86833 HLA CLASS II HIGH DEFIN QUAL: CPT | Performed by: INTERNAL MEDICINE

## 2022-02-23 PROCEDURE — 93018 CV STRESS TEST I&R ONLY: CPT

## 2022-02-23 PROCEDURE — 84443 ASSAY THYROID STIM HORMONE: CPT | Performed by: INTERNAL MEDICINE

## 2022-02-23 PROCEDURE — 250N000013 HC RX MED GY IP 250 OP 250 PS 637: Performed by: NURSE PRACTITIONER

## 2022-02-23 PROCEDURE — 86832 HLA CLASS I HIGH DEFIN QUAL: CPT | Performed by: INTERNAL MEDICINE

## 2022-02-23 PROCEDURE — 82550 ASSAY OF CK (CPK): CPT | Performed by: INTERNAL MEDICINE

## 2022-02-23 RX ORDER — EPTIFIBATIDE 2 MG/ML
180 INJECTION, SOLUTION INTRAVENOUS EVERY 10 MIN PRN
Status: DISCONTINUED | OUTPATIENT
Start: 2022-02-23 | End: 2022-02-23 | Stop reason: HOSPADM

## 2022-02-23 RX ORDER — ALBUTEROL SULFATE 90 UG/1
2 AEROSOL, METERED RESPIRATORY (INHALATION) EVERY 5 MIN PRN
Status: DISCONTINUED | OUTPATIENT
Start: 2022-02-23 | End: 2022-02-24 | Stop reason: HOSPADM

## 2022-02-23 RX ORDER — LIDOCAINE 40 MG/G
CREAM TOPICAL
Status: DISCONTINUED | OUTPATIENT
Start: 2022-02-23 | End: 2022-02-23 | Stop reason: HOSPADM

## 2022-02-23 RX ORDER — ATROPINE SULFATE 0.1 MG/ML
0.5 INJECTION INTRAVENOUS
Status: DISCONTINUED | OUTPATIENT
Start: 2022-02-23 | End: 2022-02-23 | Stop reason: HOSPADM

## 2022-02-23 RX ORDER — EPTIFIBATIDE 2 MG/ML
2 INJECTION, SOLUTION INTRAVENOUS CONTINUOUS PRN
Status: DISCONTINUED | OUTPATIENT
Start: 2022-02-23 | End: 2022-02-23 | Stop reason: HOSPADM

## 2022-02-23 RX ORDER — OXYCODONE HYDROCHLORIDE 5 MG/1
5 TABLET ORAL EVERY 4 HOURS PRN
Status: DISCONTINUED | OUTPATIENT
Start: 2022-02-23 | End: 2022-02-23 | Stop reason: HOSPADM

## 2022-02-23 RX ORDER — HEPARIN SODIUM 10000 [USP'U]/100ML
100-1000 INJECTION, SOLUTION INTRAVENOUS CONTINUOUS PRN
Status: DISCONTINUED | OUTPATIENT
Start: 2022-02-23 | End: 2022-02-23 | Stop reason: HOSPADM

## 2022-02-23 RX ORDER — DOBUTAMINE HYDROCHLORIDE 200 MG/100ML
2-20 INJECTION INTRAVENOUS CONTINUOUS PRN
Status: DISCONTINUED | OUTPATIENT
Start: 2022-02-23 | End: 2022-02-23 | Stop reason: HOSPADM

## 2022-02-23 RX ORDER — DOPAMINE HYDROCHLORIDE 160 MG/100ML
2-20 INJECTION, SOLUTION INTRAVENOUS CONTINUOUS PRN
Status: DISCONTINUED | OUTPATIENT
Start: 2022-02-23 | End: 2022-02-23 | Stop reason: HOSPADM

## 2022-02-23 RX ORDER — NALOXONE HYDROCHLORIDE 0.4 MG/ML
0.2 INJECTION, SOLUTION INTRAMUSCULAR; INTRAVENOUS; SUBCUTANEOUS
Status: DISCONTINUED | OUTPATIENT
Start: 2022-02-23 | End: 2022-02-23 | Stop reason: HOSPADM

## 2022-02-23 RX ORDER — ASPIRIN 325 MG
325 TABLET ORAL ONCE
Status: COMPLETED | OUTPATIENT
Start: 2022-02-23 | End: 2022-02-23

## 2022-02-23 RX ORDER — METHYLPREDNISOLONE SODIUM SUCCINATE 125 MG/2ML
125 INJECTION, POWDER, LYOPHILIZED, FOR SOLUTION INTRAMUSCULAR; INTRAVENOUS
Status: DISCONTINUED | OUTPATIENT
Start: 2022-02-23 | End: 2022-02-24 | Stop reason: HOSPADM

## 2022-02-23 RX ORDER — GADOBUTROL 604.72 MG/ML
10 INJECTION INTRAVENOUS ONCE
Status: COMPLETED | OUTPATIENT
Start: 2022-02-23 | End: 2022-02-23

## 2022-02-23 RX ORDER — SODIUM CHLORIDE 9 MG/ML
INJECTION, SOLUTION INTRAVENOUS CONTINUOUS
Status: DISCONTINUED | OUTPATIENT
Start: 2022-02-23 | End: 2022-02-23 | Stop reason: HOSPADM

## 2022-02-23 RX ORDER — ASPIRIN 81 MG/1
243 TABLET, CHEWABLE ORAL ONCE
Status: COMPLETED | OUTPATIENT
Start: 2022-02-23 | End: 2022-02-23

## 2022-02-23 RX ORDER — ONDANSETRON 2 MG/ML
4 INJECTION INTRAMUSCULAR; INTRAVENOUS
Status: DISCONTINUED | OUTPATIENT
Start: 2022-02-23 | End: 2022-02-24 | Stop reason: HOSPADM

## 2022-02-23 RX ORDER — ACETAMINOPHEN 325 MG/1
650 TABLET ORAL EVERY 4 HOURS PRN
Status: DISCONTINUED | OUTPATIENT
Start: 2022-02-23 | End: 2022-02-23 | Stop reason: HOSPADM

## 2022-02-23 RX ORDER — IOPAMIDOL 755 MG/ML
INJECTION, SOLUTION INTRAVASCULAR
Status: DISCONTINUED | OUTPATIENT
Start: 2022-02-23 | End: 2022-02-23 | Stop reason: HOSPADM

## 2022-02-23 RX ORDER — AMINOPHYLLINE 25 MG/ML
100 INJECTION, SOLUTION INTRAVENOUS ONCE
Status: COMPLETED | OUTPATIENT
Start: 2022-02-23 | End: 2022-02-23

## 2022-02-23 RX ORDER — FENTANYL CITRATE 50 UG/ML
INJECTION, SOLUTION INTRAMUSCULAR; INTRAVENOUS
Status: DISCONTINUED | OUTPATIENT
Start: 2022-02-23 | End: 2022-02-23 | Stop reason: HOSPADM

## 2022-02-23 RX ORDER — ARGATROBAN 1 MG/ML
150 INJECTION, SOLUTION INTRAVENOUS
Status: DISCONTINUED | OUTPATIENT
Start: 2022-02-23 | End: 2022-02-23 | Stop reason: HOSPADM

## 2022-02-23 RX ORDER — NITROGLYCERIN 20 MG/100ML
10-200 INJECTION INTRAVENOUS CONTINUOUS PRN
Status: DISCONTINUED | OUTPATIENT
Start: 2022-02-23 | End: 2022-02-23 | Stop reason: HOSPADM

## 2022-02-23 RX ORDER — REGADENOSON 0.08 MG/ML
0.4 INJECTION, SOLUTION INTRAVENOUS ONCE
Status: COMPLETED | OUTPATIENT
Start: 2022-02-23 | End: 2022-02-23

## 2022-02-23 RX ORDER — NALOXONE HYDROCHLORIDE 0.4 MG/ML
0.4 INJECTION, SOLUTION INTRAMUSCULAR; INTRAVENOUS; SUBCUTANEOUS
Status: DISCONTINUED | OUTPATIENT
Start: 2022-02-23 | End: 2022-02-23 | Stop reason: HOSPADM

## 2022-02-23 RX ORDER — EPTIFIBATIDE 2 MG/ML
1 INJECTION, SOLUTION INTRAVENOUS CONTINUOUS PRN
Status: DISCONTINUED | OUTPATIENT
Start: 2022-02-23 | End: 2022-02-23 | Stop reason: HOSPADM

## 2022-02-23 RX ORDER — CAFFEINE CITRATE 20 MG/ML
60 SOLUTION INTRAVENOUS
Status: DISCONTINUED | OUTPATIENT
Start: 2022-02-23 | End: 2022-02-24 | Stop reason: HOSPADM

## 2022-02-23 RX ORDER — FENTANYL CITRATE 50 UG/ML
25 INJECTION, SOLUTION INTRAMUSCULAR; INTRAVENOUS
Status: DISCONTINUED | OUTPATIENT
Start: 2022-02-23 | End: 2022-02-23 | Stop reason: HOSPADM

## 2022-02-23 RX ORDER — ACYCLOVIR 200 MG/1
0-1 CAPSULE ORAL
Status: DISCONTINUED | OUTPATIENT
Start: 2022-02-23 | End: 2022-02-24 | Stop reason: HOSPADM

## 2022-02-23 RX ORDER — OXYCODONE HYDROCHLORIDE 10 MG/1
10 TABLET ORAL EVERY 4 HOURS PRN
Status: DISCONTINUED | OUTPATIENT
Start: 2022-02-23 | End: 2022-02-23 | Stop reason: HOSPADM

## 2022-02-23 RX ORDER — DIPHENHYDRAMINE HYDROCHLORIDE 50 MG/ML
25-50 INJECTION INTRAMUSCULAR; INTRAVENOUS
Status: DISCONTINUED | OUTPATIENT
Start: 2022-02-23 | End: 2022-02-24 | Stop reason: HOSPADM

## 2022-02-23 RX ORDER — POTASSIUM CHLORIDE 7.45 MG/ML
10 INJECTION INTRAVENOUS
Status: COMPLETED | OUTPATIENT
Start: 2022-02-23 | End: 2022-02-23

## 2022-02-23 RX ORDER — POTASSIUM CHLORIDE 750 MG/1
20 TABLET, EXTENDED RELEASE ORAL
Status: DISCONTINUED | OUTPATIENT
Start: 2022-02-23 | End: 2022-02-23 | Stop reason: HOSPADM

## 2022-02-23 RX ORDER — POTASSIUM CHLORIDE 750 MG/1
40 TABLET, EXTENDED RELEASE ORAL
Status: DISCONTINUED | OUTPATIENT
Start: 2022-02-23 | End: 2022-02-23 | Stop reason: HOSPADM

## 2022-02-23 RX ORDER — DIAZEPAM 5 MG
5 TABLET ORAL EVERY 30 MIN PRN
Status: DISCONTINUED | OUTPATIENT
Start: 2022-02-23 | End: 2022-02-24 | Stop reason: HOSPADM

## 2022-02-23 RX ORDER — FLUMAZENIL 0.1 MG/ML
0.2 INJECTION, SOLUTION INTRAVENOUS
Status: DISCONTINUED | OUTPATIENT
Start: 2022-02-23 | End: 2022-02-23 | Stop reason: HOSPADM

## 2022-02-23 RX ORDER — ARGATROBAN 1 MG/ML
350 INJECTION, SOLUTION INTRAVENOUS
Status: DISCONTINUED | OUTPATIENT
Start: 2022-02-23 | End: 2022-02-23 | Stop reason: HOSPADM

## 2022-02-23 RX ORDER — DIPHENHYDRAMINE HCL 25 MG
25 CAPSULE ORAL
Status: DISCONTINUED | OUTPATIENT
Start: 2022-02-23 | End: 2022-02-24 | Stop reason: HOSPADM

## 2022-02-23 RX ADMIN — LIDOCAINE: 40 CREAM TOPICAL at 11:12

## 2022-02-23 RX ADMIN — POTASSIUM CHLORIDE 10 MEQ: 7.46 INJECTION, SOLUTION INTRAVENOUS at 11:52

## 2022-02-23 RX ADMIN — SODIUM CHLORIDE: 9 INJECTION, SOLUTION INTRAVENOUS at 11:22

## 2022-02-23 RX ADMIN — REGADENOSON 0.4 MG: 0.08 INJECTION, SOLUTION INTRAVENOUS at 09:37

## 2022-02-23 RX ADMIN — GADOBUTROL 10 ML: 604.72 INJECTION INTRAVENOUS at 09:10

## 2022-02-23 RX ADMIN — GADOBUTROL 10 ML: 604.72 INJECTION INTRAVENOUS at 09:11

## 2022-02-23 RX ADMIN — ASPIRIN 325 MG ORAL TABLET 325 MG: 325 PILL ORAL at 11:11

## 2022-02-23 RX ADMIN — AMINOPHYLLINE 100 MG: 25 INJECTION, SOLUTION INTRAVENOUS at 09:39

## 2022-02-23 RX ADMIN — ASPIRIN 325 MG ORAL TABLET 325 MG: 325 PILL ORAL at 11:43

## 2022-02-23 RX ADMIN — POTASSIUM CHLORIDE 10 MEQ: 7.46 INJECTION, SOLUTION INTRAVENOUS at 14:02

## 2022-02-23 NOTE — PROGRESS NOTES
Pt c/o left shoulder and left upper arm pain.  No chest pain, no sweating. Sats 98%, heart rate 102.  B/P 153/100. Pt has potassium infusing in the IV, infusion stopped.  PIV has good blood return.  Estefania Greco KEELEY over to see pt. She agrees stopping the infusioin, if pain subsides will restart infusion at half the rate.  1227-Pain in the left arm is now gone.  B/P 151/94.  1248:  PIV has a good blood return, pt tolerated NS flush of 10cc via PIV.  Potassium infusion restared at 50cc/hr.  Will continue to monitor site closely.

## 2022-02-23 NOTE — DISCHARGE INSTRUCTIONS
Going Home after Coronary Angiogram     FOR 24 HOURS:         Have an adult stay with you for 24 hours.         Relax and take it easy.         Drink plenty of fluids.         Do NOT make any important or legal decisions.         Do NOT drive or operate machines at home or at work.         Do NOT drink alcohol.     PROCEDURE SITE:  Care of groin site:         Remove the Band-Aid after 24 hours. If there is minor oozing, apply another Band-aid and remove it after 12 hours.          Do NOT take a bath, or use a hot tub or pool for at least 3 days. You may shower.          It is normal to have a small bruise or lump at the site.         Do not scrub the site.         Do not use lotion or powder near the puncture site for 3 days.         For the first 2 days: Do not stoop or squat. When you cough, sneeze or move your bowels, hold your hand over the puncture site and press gently.         Do not lift more than 10 pounds for at least 3 to 5 days.         For 2 days, do NOT have sex or do any heavy exercise.     If you start bleeding from the site in your groin:  Lie down flat and press firmly on the site.  Call your physician immediately, or, come to the emergency room.    Call 911 right away if you have bleeding that is heavy or does not stop.      MEDICATIONS:  1. If you are on metformin (Glucophage), do not restart it until you have blood tests (within 2 to 3 days after discharge). When your doctor tells you it is safe, you may restart the metformin.   2. Please review your medication instructions in your discharge paperwork. If you have not been continued on other medications you were previously taking at home it is probably for reason though please discuss your concerns with any of your doctors.         DIET:  We recommend a diet low in saturated fat, trans fat and cholesterol. In addition it will be helpful to be cautious of sodium intake, sugar and carbohydrates. Try to increase the amount of lean meats you eat  like fish and chicken, but avoid frying; and reduce the amount of red meat you eat. Eat more fresh fruits and vegetables and try to avoid canned and processed food. Please reference the handouts you received for more specific information.    OTHER INFORMATION:  1. Consider having your family members learn CPR if they do not know it already.  2. If you are a smoker, quitting smoking will be one of the most important things you can do for yourself. There are nicotine replacement options or medications they might be able to be prescribed. Please discuss this with your doctors. Consider calling the [employer] at [phone]-RBNC ([phone]) as they can offer ongoing support after discharge. ***    CALL YOUR DOCTOR IF:  -You have a large or growing lump/bump around the procedure site  -The site is red, swollen, hot, tender or has drainage  -You have hives, a rash or unusual itching  -You have increasing or worsening shortness of breath or chest pain    FOLLOW UP:  We prefer you to follow up with your primary care provider within one week. You will be arranged to see the cardiologist (heart doctor) in clinic in about one month.     Should you need to contact us:  Cardiology clinic for scheduling or triage nurse questions/concerns:  [phone]

## 2022-02-23 NOTE — PROGRESS NOTES
Patient tolerated recovery stage well. VSS, right groin site clean/dry/intact, no hematoma, and denies pain. Patient tolerated PO food and fluids. Teaching was done and discharge instructions were given. Patient ambulated, voided, and PIV was removed. Patient discharged from the hospital to home with friend.    None

## 2022-02-23 NOTE — PROGRESS NOTES
Patient arrives to 2A in room 12. Patient arrives alone, friend will transport patient home after procedure. VSS, AOx4. Prep completed, including groin prep and aspirin given. K 3.6, patient reports inability to tolerate PO KCl. KEELEY notified and will order IV replacement. Will continue to assess patient until taken for procedure.

## 2022-02-23 NOTE — Clinical Note
dry, intact, no bleeding and no hematoma. 7fr sheath remains in place in R Femoral vein, 4fr sheath remains in place of R fem artery. Both sheaths capped and covered w/ tegaderm. CDI

## 2022-02-23 NOTE — H&P
[facility]   Interventional Cardiology   History and Physical     Everton Larios MRN# [id number]   YOB: 1965 Age: 56 year old     Chief Complaint: OHT surveillance     HPI: Mr. Everton Larios is 56yr old male with a history of ASD (s/p repair in childhood), AFib/AF (s/p ablation), NICM, diastolic dysfunction, alcohol abuse, Pierce's esophagus, ulcerative colitis, GIB (s/p splenic embolization), and hypothyroidism, now s/p OHT and bypass of persistent left SVC to right atrial appendage 2/24/19. He presents to  for routine procedures.     His postoperative course was c/b shock due to RV dysfunction requiring IABP support, small pneumoperitoneum (clinically insignificant), chest wall hematoma (former ICD site, s/p evacuation and drain placement 3/31/19), HCAP/klebsiella pneumonia, and FRANKLIN (required short-term HD, now recovered).  He was transferred to  rehab 4/3, and ultimately discharged to his local residence 4/15.     He was rehospitalized 4/28-5/9/19 with fevers, URI symptoms, abdominal pain, and diarrhea, and was found to be neutropenic with pseudomonas bacteremia and HCAP.  Chest CT showed diffuse bilateral solid with peripheral groundglass pulmonary nodules/opacities and mediastinal adenopathy, BAL grew pseudomonas, and fungitell was positive.  Transplant ID was consulted, and he was treated with 4 weeks of IV cefepime and micafungin.  Repeat CT 5/22 showed significantly decreased bilateral nodular and consolidative opacities.     Rejection history:  His biopsy 3/25/19 showed mild AMR1 with some staining for c4d.  Repeat biopsy 3/28 showed improved AMR and less reactive capillary staining, and subsequent biopsies have now shown resolution of AMR and improved capillary staining.  AlloMap scores:  < 35 recently  DSAs:  no  Coronary angio/Ischemic eval:  Coronary angiogram 3/2021 showed normal coronary arteries with no angiographic evidence of obstruction.  Last RHC:   "3/2021 showed elevated biventricular filling pressures, with RA 17, mPA 24, PCW 16, and CI 2.6.  Echo/cMRI:  TTE 8/31/21 showed stable graft function, with LVEF 55-60% and normal RV size/function.  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    Today, he is feeling in his usual state of health. He has chronic CP which has been going on sinc before transplant. Resolved for a few months aftr transplant and now is back. No associated with exertion or eating. He notices it mostly when he is bored or has less to do. He has been told this is \"chest wall\" pain by his PCP. Otherwise he denies fevers/chills, headaches, cough, sore throat, mouth pain, N/V/D (unless he takes medicaitons on an empty stomach) and swelling in his legs.    Last ASA was 325 mg on 2a  Not on anticoagulation.  Last food/drink was yesterday.    ROS: The patient is currently denying fever/chills, chest pain, shortness of breath, cough, nausea/vomiting/diarrhea, and swelling in the legs. All other systems were reviewed and were negative.      Consenting/Education for Cardiac Cath Lab Procedure: Right Heart Catheterization, Coronary Angiogram and Possible Percutaneous Intervention     Patient understands we would like to perform .Right Heart Catheterization, Coronary Angiogram and Possible Percutaneous Intervention due to heart transplant. This procedure will be performed by Dr. Mckeon.    The patient understands the following:     Right Heart Catheterization: A fine tube (catheter) is put into the vein of the groin/neck.  It is carefully passed along until it reaches the heart and then goes up into the blood vessels of the lungs. This is done to measure a variety of pressures in your heart and can tell us how well the heart is filling and emptying, as well as monitor fluid status.  and " Coronary Angiogram with Possible Percutaneous Intervention: During the portion for the coronary angiogram a fine tube (catheter) is put into the artery in the groin/arm. The tube is carefully passed into each coronary artery. A series of pictures are taken using x-rays and a contrast medium (x-ray dye). The contrast medium is injected to look for any blockages or narrowing in the arteries of the heart. If necessary and indicated, a stent may be deployed during this procedure.  At the end of the procedure the artery may be closed with a special plug, Angio Seal, to stop the bleeding.     Moderate sedation is required for this procedure, which the patient understands. Patient also understands risks and complications of the procedure which include, but are not limited to bruising/swelling around the incision site, infection, bleeding, allergic reaction to local anesthetic, air embolism, arterial puncture, stroke, heart attack.       Patient verbalized understanding of risks and benefits and has elected to proceed with the procedure or procedures listed above.      Past Medical History:   Diagnosis Date     Alcohol abuse      Arthritis     hands, neck     ASD (atrial septal defect)     s/p repair age 3     Asthma      Atrial fibrillation (H)      Pierce's esophagus 10/4/2018     Pierce's esophagus      Cataract      CHF (congestive heart failure) (H)      Chronic rhinitis 10/4/2018     Chronic systolic heart failure (H) 10/4/2018     Clotting disorder (H)      Congenital cardiomyopathy in  (H)      Depression 10/4/2018     Depression      Diastolic dysfunction      DJD (degenerative joint disease)     neck     DJD (degenerative joint disease) - neck 10/4/2018     H/O congenital atrial septal defect (ASD) repair at age 3 10/4/2018     History of anesthesia complications     nausea     History of transfusion      Hypothyroidism      Hypothyroidism due to medication 10/4/2018     ICD (implantable  cardioverter-defibrillator) in place      ICD, [employer] 2008; gen change 2/2018 10/4/2018     Intermittent asthma without complication 10/4/2018     Nonischemic cardiomyopathy (H) 10/4/2018     On amiodarone therapy 10/4/2018     Pacemaker      Paroxysmal atrial fibrillation (H) 10/4/2018     S/P ablation of atrial fibrillation 10/4/2018     Systolic heart failure (H)      Ulcerative colitis (H) 2005     Ulcerative colitis (H)      Ventricular tachycardia (H) 10/4/2018     Ventricular tachycardia (H)        Past Surgical History:   Procedure Laterality Date     ABLATION OF DYSRHYTHMIC FOCUS      for A fib     AICD, DUAL CHAMBER       ASD REPAIR  01/01/1968     BRONCHOSCOPY (RIGID OR FLEXIBLE), DIAGNOSTIC N/A 04/30/2019    Procedure: BRONCHOSCOPY, WITH BAL;  Surgeon: Margot Chiang MD;  Location:  GI     CARDIAC DEFIBRILLATOR PLACEMENT      x2--last was Feb 2018     COLONOSCOPY N/A 02/20/2020    Procedure: COLONOSCOPY, WITH POLYPECTOMY AND BIOPSY;  Surgeon: Ranjeet Cooper MD;  Location:  GI     COLONOSCOPY N/A 02/05/2015    Procedure: COLONOSCOPY with biopsy;  Surgeon: Fernie Akers MD;  Location: [facility];  Service:      COLONOSCOPY N/A 12/19/2017    Procedure: COLONOSCOPY with biopsies and polypectomies using snare;  Surgeon: Gael Romero MD;  Location: [facility];  Service:      CV CORONARY ANGIOGRAM N/A 02/12/2020    Procedure: CV CORONARY ANGIOGRAM;  Surgeon: Isiah Mcallister MD;  Location: [facility] CARDIAC CATH LAB     CV CORONARY ANGIOGRAM N/A 03/17/2021    Procedure: CV CORONARY ANGIOGRAM;  Surgeon: Santino Mckeon MD;  Location:  HEART CARDIAC CATH LAB     CV HEART BIOPSY N/A 03/04/2019    Procedure: Heart Biopsy;  Surgeon: Abhijeet Titus MD;  Location:  HEART CARDIAC CATH LAB     CV HEART BIOPSY N/A 03/25/2019    Procedure: Heart Biopsy;  Surgeon: Yves Rodrigues MD;  Location: [facility] CARDIAC CATH LAB     CV HEART BIOPSY N/A 03/28/2019     Procedure: Heart Cath Heart Biopsy;  Surgeon: Yves Rodrigues MD;  Location:  HEART CARDIAC CATH LAB     CV HEART BIOPSY N/A 04/10/2019    Procedure: CV HEART BIOPSY;  Surgeon: Isiah Mcallister MD;  [facility]     CV HEART BIOPSY N/A 04/24/2019    Procedure: CV HEART BIOPSY;  Surgeon: Isiah Mcallister MD;  [facility]     CV HEART BIOPSY N/A 05/08/2019    Procedure: CV HEART BIOPSY;  Surgeon: Isiah Mcallister MD;  [facility]     CV HEART BIOPSY N/A 06/04/2019    Procedure: CV HEART BIOPSY;  Surgeon: Alton Solomon MD;  Location:  HEART CARDIAC CATH LAB     CV HEART BIOPSY N/A 05/22/2019    Procedure: CV HEART BIOPSY;  Surgeon: Yves Rodrigues MD;  Location:  HEART CARDIAC CATH LAB     CV HEART BIOPSY N/A 07/17/2019    Procedure: CV HEART BIOPSY;  Surgeon: Isiah Mcallister MD;  Location:  HEART CARDIAC CATH LAB     CV HEART BIOPSY N/A 08/13/2019    Procedure: CV HEART BIOPSY;  Surgeon: Jackson Patten MD;  Location:  HEART CARDIAC CATH LAB     CV HEART BIOPSY N/A 10/15/2019    Procedure: CV HEART BIOPSY;  Surgeon: Santino Mckeon MD;  Location:  HEART CARDIAC CATH LAB     CV HEART BIOPSY N/A 02/12/2020    Procedure: CV HEART BIOPSY;  Surgeon: Isiah Mcallister MD;  Location:  HEART CARDIAC CATH LAB     CV HEART BIOPSY N/A 05/05/2020    Procedure: CV HEART BIOPSY;  Surgeon: Yves Rodrigues MD;  Location:  HEART CARDIAC CATH LAB     CV HEART BIOPSY N/A 03/17/2021    Procedure: CV HEART BIOPSY;  Surgeon: Santino Mckeon MD;  Location:  HEART CARDIAC CATH LAB     CV INTRA AORTIC BALLOON N/A 02/18/2019    Procedure: Intra-Aortic Balloon;  Surgeon: Alton Solomon MD;  Location:  HEART CARDIAC CATH LAB     CV INTRA AORTIC BALLOON N/A 02/20/2019    Procedure: Replace subclavian IABP;  Surgeon: Yves Rodrigues MD;  Location:  HEART CARDIAC CATH LAB     CV INTRAVASULAR  ULTRASOUND N/A 02/12/2020    Procedure: CV INTRAVASCULAR ULTRASOUND;  Surgeon: Isiah Mcallister MD;  Location: [facility] CARDIAC CATH LAB     CV LEFT HEART CATH N/A 03/19/2019    Procedure: Left Heart Cath;  Surgeon: Yves Rodrigues MD;  Location: [facility] CARDIAC CATH LAB     CV LEFT HEART CATH N/A 02/12/2020    Procedure: Left Heart Cath;  Surgeon: Isiah Mcallister MD;  Location: [facility] HE[facility] CARDIAC CATH LAB     CV MYOCARDIAL BIOPSY N/A 03/19/2019    Procedure: RHC/HBx - Femoral access for 3/19 per Nimisha GONZALEZ;  Surgeon: Yves Rodrigues MD;  [facility]     CV MYOCARDIAL BIOPSY N/A 03/11/2019    Procedure: ADD ON RHC/HBX;  Surgeon: Alton Solomon MD;  [facility]     CV RIGHT HEART CATH MEASUREMENTS RECORDED N/A 03/25/2019    Procedure: Right Heart Cath;  Surgeon: Yves Rodrigues MD;  Location: [facility] CARDIAC CATH LAB     CV RIGHT HEART CATH MEASUREMENTS RECORDED N/A 03/28/2019    Procedure: Heart Cath Right Heart Cath;  Surgeon: Yves Rodrigues MD;  [facility]     CV RIGHT HEART CATH MEASUREMENTS RECORDED N/A 04/24/2019    Procedure: CV RIGHT HEART CATH;  Surgeon: Isiah Mcallister MD;  [facility]     CV RIGHT HEART CATH MEASUREMENTS RECORDED N/A 06/04/2019    Procedure: CV RIGHT HEART CATH;  Surgeon: Alton Solomon MD;  [facility]     CV RIGHT HEART CATH MEASUREMENTS RECORDED N/A 05/22/2019    Procedure: CV RIGHT HEART CATH;  Surgeon: Yves Rodrigues MD;  [facility]     CV RIGHT HEART CATH MEASUREMENTS RECORDED N/A 08/13/2019    Procedure: CV RIGHT HEART CATH;  Surgeon: Jackson Patten MD;  [facility]     CV RIGHT HEART CATH MEASUREMENTS RECORDED N/A 10/15/2019    Procedure: CV RIGHT HEART CATH;  Surgeon: Santino Mckeon MD;  [facility]: [facility] CARDIAC CATH LAB     CV RIGHT HEART CATH  MEASUREMENTS RECORDED N/A 02/12/2020    Procedure: CV RIGHT HEART CATH;  Surgeon: Isiah Mcallister MD;  Location:  HEART CARDIAC CATH LAB     CV RIGHT HEART CATH MEASUREMENTS RECORDED N/A 03/17/2021    Procedure: CV RIGHT HEART CATH;  Surgeon: Santino Mckeon MD;  Location:  HEART CARDIAC CATH LAB     ESOPHAGOSCOPY, GASTROSCOPY, DUODENOSCOPY (EGD), COMBINED N/A 12/19/2017    Procedure: ESOPHAGOGASTRODUODENOSCOPY (EGD) with biopsies;  Surgeon: Gael Romero MD;  Location: [facility] GI;  Service:      H STATISTIC PICC LINE INSERTION >5YR, FAILED Bilateral 10/28/2021    L sided SVC     HAND SURGERY Left      HC REVISE MEDIAN N/CARPAL TUNNEL SURG  09/21/2016    Procedure: OPEN RIGHT CARPAL TUNNEL RELEASE CORTISONE INJECTION RIGHT THUMB;  Surgeon: Duong Santoyo MD;  Location: [facility] OR;  Service: Orthopedics     INCISION AND DRAINAGE CHEST WASHOUT, COMBINED N/A 03/21/2019    Procedure: Incision And Drainage; Evacuation Right Chest Wall Hematoma;  Surgeon: Dewayne House MD;  Location: [facility] OR     INSERT INTRAAORTIC BALLOON PUMP Left 02/19/2019    Procedure: Insert left  Subclavian Balloon Pump,  Removal Right femoral arterial balloom pump sheath;  Surgeon: Dewayne House MD;  Location: [facility] OR     INSERT INTRAAORTIC BALLOON PUMP Left 02/21/2019    Procedure: SUBCLAVIAN BALLOON PUMP PLACEMENT;  Surgeon: Ben White MD;  Location:  OR     INSERT INTRACORONARY STENT      x2     IR PICC PLACEMENT > 5 YRS OF AGE  05/08/2019     TRANSPLANT HEART RECIPIENT N/A 02/24/2019    Procedure: TRANSPLANT HEART RECIPIENT;  Surgeon: Dewayne House MD;  Location: [facility] OR       No current facility-administered medications on file prior to encounter.  amLODIPine (NORVASC) 5 MG tablet, TAKE 2 TABLETS BY MOUTH EVERY DAY  aspirin (ASA) 81 MG chewable tablet, Take 1 tablet (81 mg) by mouth daily  calcium carbonate 600 mg-vitamin D 400 units (CALTRATE) 600-400 MG-UNIT per tablet, Take 1 tablet by  mouth 2 times daily (with meals)  cetirizine (ZYRTEC) 10 MG tablet, Take 10 mg by mouth daily  levothyroxine (SYNTHROID/LEVOTHROID) 75 MCG tablet, Take 1 tablet (75 mcg) by mouth daily  multivitamin w/minerals (THERA-VIT-M) tablet, Take 1 tablet by mouth daily  polyethylene glycol (MIRALAX/GLYCOLAX) powder, Take 1 capful by mouth daily  rosuvastatin (CRESTOR) 10 MG tablet, TAKE 1 TABLET BY MOUTH EVERY DAY  acetaminophen (TYLENOL) 325 MG tablet, Take 2 tablets (650 mg) by mouth every 4 hours as needed for mild pain  albuterol (PROAIR HFA/PROVENTIL HFA/VENTOLIN HFA) 108 (90 Base) MCG/ACT inhaler, Inhale 2 puffs into the lungs every 6 hours as needed for shortness of breath / dyspnea or wheezing (Patient not taking: Reported on 2021)  alendronate (FOSAMAX) 70 MG tablet, Take 1 tablet (70 mg) by mouth every 7 days Take 60 minutes before am meal with 8 oz. water. Remain upright for 30 minutes.  fluticasone (FLOVENT HFA) 220 MCG/ACT Inhaler, Inhale 2 puffs into the lungs 2 times daily (Patient not taking: Reported on 2021)  melatonin 3 MG tablet, Take 2 tablets (6 mg) by mouth At Bedtime (Patient taking differently: Take 5 mg by mouth nightly as needed )  ondansetron (ZOFRAN-ODT) 4 MG ODT tab, DISSOLVE 1 TABLET ON THE TONGUE EVERY EIGHT HOURS AS NEEDED (Patient not taking: Reported on 2021)  senna (SENOKOT) 8.6 MG tablet, Take 2 tablets by mouth 2 times daily as needed for constipation        Family History   Problem Relation Age of Onset     Endometrial Cancer Mother      Osteoporosis Mother      Hypertension Father      Endometrial Cancer Maternal Grandmother      Hip fracture No family hx of      Nephrolithiasis No family hx of        Social History     Tobacco Use     Smoking status: Former Smoker     Packs/day: 1.00     Years: 25.00     Pack years: 25.00     Start date: 1980     Quit date: 2008     Years since quittin.3     Smokeless tobacco: Never Used   Substance Use Topics     Alcohol  "use: Yes     Alcohol/week: 1.0 - 2.0 standard drink     Types: 1 - 2 Cans of beer per week       Allergies   Allergen Reactions     Hydromorphone Other (See Comments)     Significant Delirium     Adhesive Tape Blisters     Tegaderm causes bruises, blisters and extreme irritation.     Lisinopril Other (See Comments)     hypotension     Quinolones Other (See Comments)     Would not rx quinolones until QTc interval improved.      Tobramycin Other (See Comments)     Would not use aminoglycosides as his kidney function is very borderline     Codeine Nausea         Physical Examination:  Vitals: BP (!) 155/99 (BP Location: Right arm)   Pulse 111   Temp 98.1  F (36.7  C) (Oral)   Resp 18   Ht 1.727 m (5' 8\")   Wt 79.4 kg (175 lb)   SpO2 99%   BMI 26.61 kg/m    BMI= Body mass index is 26.61 kg/m .    Constitutional: Alert and awake, well appearing, in no significant pain or respiratory distress  ENT: Mallampati III, no mouth sores  Skin: No erythema or signs of infection  Cardiac: RRR, no r/m/g  Pulmonary: Normal respiratory effort, good air movement, no adventitious lung sounds, no areas of decreased lung sounds  GI: +BS, NTTP  Extremities: Trace to 1+ LE edema, all extremities are warm and well-perfused    Laboratory:  CMP  Recent Labs   Lab 02/23/22  0727      POTASSIUM 3.6   CHLORIDE 108   CO2 22   ANIONGAP 9   GLC 92   BUN 31*   CR 1.94*   GFRESTIMATED 39*   RADAMES 9.1   MAG 1.8   PHOS 3.3   PROTTOTAL 7.8   ALBUMIN 3.2*   BILITOTAL 0.4   ALKPHOS 113   AST 24   ALT 31     CBC  Recent Labs   Lab 02/23/22  0727   WBC 5.1   RBC 4.93   HGB 12.1*   HCT 37.9*   MCV 77*   MCH 24.5*   MCHC 31.9   RDW 15.4*          Lab Results   Component Value Date    TROPI <0.015 09/23/2019    TROPI 0.198 () 03/27/2019    TROPI 3.196 () 03/01/2019         Assessment and plan:   Mr. Everton Larios is 56yr old male with a history of ASD (s/p repair in childhood), AFib/AF (s/p ablation), NICM, diastolic dysfunction, alcohol " abuse, Pierce's esophagus, ulcerative colitis, GIB (s/p splenic embolization), and hypothyroidism, now s/p OHT and bypass of persistent left SVC to right atrial appendage 2/24/19. He presents to 2a for routine procedures.     # H/o OHT in 2019  # H/o mild CAV (noted Feb 2020)  - Proceed with planned coronary angioram and RHC today as orered  - Kcl 20 meq IV pat-procedurally  - On siro and tac per OHT team  - Continue crestsor  - Not on A/C   - Will give 325 mg of ASA prior to cath lab    # Hypothyroidism  - Continue synthroid    # Ulcerative Colitis w/h/o GIB  - Hgb stable today, no bleeding symptoms    Elizabeth Greco PA-C  [facility]

## 2022-02-23 NOTE — SEDATION DOCUMENTATION
D/I/A: Manual pressure held for 20 minutes to R groin.  Sheath, size 4F in RFA, 7F in RFV,  pulled by RADHA Duke. RFA pulled at 1432, RFV pulled at 1443. Site CDI, no hematoma.  Stasis achieved at 1453. Off bedrest @ 1653.  A+O x4 and making needs known.  Denies pain or sob.  VSSA.  Tolerated sheath removal well.  P: Continue to monitor status.  Discharge to home once meeting criteria.

## 2022-02-23 NOTE — PROGRESS NOTES
Patient roomed in bay 32. Arrived via stretcher accompanied by CCL RN off monitor. VSS. Rhythm upon arrival is SR on the room monitor. Denies pain and SOB. Reviewed activity and restrictions with patient and to notify RN of any symptom change. CCL access: right groin sheaths secured in place. Will continue to monitor status.

## 2022-02-23 NOTE — PRE-PROCEDURE
GENERAL PRE-PROCEDURE:   Procedure:  Right heart catheterization and coronary angiogram with possible PCI  Date/Time:  2/23/2022 11:50 AM    Written consent obtained?: Yes    Risks and benefits: Risks, benefits and alternatives were discussed    Consent given by:  Patient  Patient states understanding of procedure being performed: Yes    Patient's understanding of procedure matches consent: Yes    Procedure consent matches procedure scheduled: Yes    Appropriately NPO:  Yes  Mallampati  :  Grade 3- soft palate visible, posterior pharyngeal wall not visible  Lungs:  Lungs clear with good breath sounds bilaterally  Heart:  Normal heart sounds and rate  History & Physical reviewed:  History and physical reviewed and no updates needed  Statement of review:  I have reviewed the lab findings, diagnostic data, medications, and the plan for sedation

## 2022-02-24 LAB
ATRIAL RATE - MUSE: 101 BPM
ATRIAL RATE - MUSE: 101 BPM
DIASTOLIC BLOOD PRESSURE - MUSE: NORMAL MMHG
DIASTOLIC BLOOD PRESSURE - MUSE: NORMAL MMHG
DONOR IDENTIFICATION: NORMAL
DSA COMMENTS: NORMAL
DSA PRESENT: NO
DSA TEST METHOD: NORMAL
EBV DNA # SPEC NAA+PROBE: NOT DETECTED COPIES/ML
INTERPRETATION ECG - MUSE: NORMAL
INTERPRETATION ECG - MUSE: NORMAL
ORGAN: NORMAL
P AXIS - MUSE: 64 DEGREES
P AXIS - MUSE: 70 DEGREES
PR INTERVAL - MUSE: 164 MS
PR INTERVAL - MUSE: 180 MS
QRS DURATION - MUSE: 136 MS
QRS DURATION - MUSE: 150 MS
QT - MUSE: 370 MS
QT - MUSE: 380 MS
QTC - MUSE: 479 MS
QTC - MUSE: 492 MS
R AXIS - MUSE: 69 DEGREES
R AXIS - MUSE: 70 DEGREES
SA 1 CELL: NORMAL
SA 1 TEST METHOD: NORMAL
SA 2 CELL: NORMAL
SA 2 TEST METHOD: NORMAL
SA1 HI RISK ABY: NORMAL
SA1 MOD RISK ABY: NORMAL
SA2 HI RISK ABY: NORMAL
SA2 MOD RISK ABY: NORMAL
SYSTOLIC BLOOD PRESSURE - MUSE: NORMAL MMHG
SYSTOLIC BLOOD PRESSURE - MUSE: NORMAL MMHG
T AXIS - MUSE: 80 DEGREES
T AXIS - MUSE: 81 DEGREES
UNACCEPTABLE ANTIGENS: NORMAL
UNOS CPRA: 0
VENTRICULAR RATE- MUSE: 101 BPM
VENTRICULAR RATE- MUSE: 101 BPM
ZZZSA 1  COMMENTS: NORMAL
ZZZSA 2 COMMENTS: NORMAL

## 2022-02-25 LAB
ALLOMAP SCORE (EXTERNAL): 30
NEGATIVE PREDICTIVE VALUE PERCENT (EXTERNAL): 98.7 %
POSITIVE PREDICTIVE VALUE PERCENT (EXTERNAL): 2.1 %

## 2022-02-28 ENCOUNTER — DOCUMENTATION ONLY (OUTPATIENT)
Dept: TRANSPLANT | Facility: CLINIC | Age: 57
End: 2022-02-28
Payer: COMMERCIAL

## 2022-02-28 DIAGNOSIS — Z94.1 HEART REPLACED BY TRANSPLANT (H): ICD-10-CM

## 2022-02-28 RX ORDER — ROSUVASTATIN CALCIUM 10 MG/1
20 TABLET, COATED ORAL DAILY
Qty: 180 TABLET | Refills: 3 | Status: SHIPPED | OUTPATIENT
Start: 2022-02-28 | End: 2022-05-16

## 2022-02-28 RX ORDER — TACROLIMUS 1 MG/1
CAPSULE ORAL
Qty: 360 CAPSULE | Refills: 11 | COMMUNITY
Start: 2022-02-28 | End: 2022-03-01

## 2022-02-28 NOTE — PROGRESS NOTES
Tac goal 3-5. Level 5.3. Current dose 3/2. Instructions given to decrease to 2 mg bid.     Rapa goal 6-8. Level 6.3. Current dose 4 mg daily. No changes.     Taking crestor regularily.  Increase to 20 mg. Follow low fat diet.     Pt states he takes his meds 4 hours apart and on a regular basis. Same with his amlodipine. He will bring a copy of home BP's to next appointment.    Pt will repeat labs in 2 weeks.

## 2022-03-01 ENCOUNTER — TELEPHONE (OUTPATIENT)
Dept: GASTROENTEROLOGY | Facility: CLINIC | Age: 57
End: 2022-03-01
Payer: COMMERCIAL

## 2022-03-01 DIAGNOSIS — K51.00 ULCERATIVE PANCOLITIS WITHOUT COMPLICATION (H): Primary | ICD-10-CM

## 2022-03-01 DIAGNOSIS — Z94.1 HEART REPLACED BY TRANSPLANT (H): ICD-10-CM

## 2022-03-01 RX ORDER — TACROLIMUS 1 MG/1
CAPSULE ORAL
Qty: 360 CAPSULE | Refills: 11 | Status: SHIPPED | OUTPATIENT
Start: 2022-03-01 | End: 2022-03-15

## 2022-03-01 RX ORDER — BISACODYL 5 MG/1
TABLET, DELAYED RELEASE ORAL
Qty: 4 TABLET | Refills: 0 | Status: ON HOLD | OUTPATIENT
Start: 2022-03-01 | End: 2022-03-08

## 2022-03-01 NOTE — TELEPHONE ENCOUNTER
Patient scheduled for colonoscopy/EGD on 3/8/22.     Covid test scheduled: 3/5/22    Pre op exam scheduled?    Arrival time: 1230    Facility location: [facility]    Sedation type: MAC    Indication for procedure: UC, barretts    Anticoagulations? no     Bowel prep recommendation: Golytely d/t low GFR.     Golytely prep sent to [facility] [id number] [place] [address]. Prep instructions sent via [employer].     Pre visit planning completed.    Dena Giron RN

## 2022-03-01 NOTE — TELEPHONE ENCOUNTER
"Attempted to call patient regarding pre assessment for upcoming procedure.     Patient stated that they have the information and that they have done this before. Stated \"this is not the best time. I got to go\". Patient then ended call.     Will send [facility] [employer] message to return call to complete pre assessment and regarding pre op needed.     Dena Giron RN    "

## 2022-03-04 LAB — IMMUKNOW IMMUNE CELL FUNCTION: NORMAL

## 2022-03-05 ENCOUNTER — LAB (OUTPATIENT)
Dept: FAMILY MEDICINE | Facility: CLINIC | Age: 57
End: 2022-03-05
Payer: COMMERCIAL

## 2022-03-05 DIAGNOSIS — Z11.59 ENCOUNTER FOR SCREENING FOR OTHER VIRAL DISEASES: ICD-10-CM

## 2022-03-05 LAB — SARS-COV-2 RNA RESP QL NAA+PROBE: NEGATIVE

## 2022-03-05 PROCEDURE — U0005 INFEC AGEN DETEC AMPLI PROBE: HCPCS

## 2022-03-05 PROCEDURE — U0003 INFECTIOUS AGENT DETECTION BY NUCLEIC ACID (DNA OR RNA); SEVERE ACUTE RESPIRATORY SYNDROME CORONAVIRUS 2 (SARS-COV-2) (CORONAVIRUS DISEASE [COVID-19]), AMPLIFIED PROBE TECHNIQUE, MAKING USE OF HIGH THROUGHPUT TECHNOLOGIES AS DESCRIBED BY CMS-2020-01-R: HCPCS

## 2022-03-07 ENCOUNTER — ANESTHESIA EVENT (OUTPATIENT)
Dept: GASTROENTEROLOGY | Facility: CLINIC | Age: 57
End: 2022-03-07
Payer: COMMERCIAL

## 2022-03-07 NOTE — TELEPHONE ENCOUNTER
Pre assessment questions completed for upcoming colonoscopy/EGD procedure scheduled on 3.8.2022    COVID test: 3.5.2022-negative    Pre-op: 2.24.2022 [facility]; pt is unsure if this was faxed to [facility].  Staff message to Edy Guerra to inquire if they received an acceptable pre-op faxed in on this pt?  If not pt will need to r/s.    Reviewed procedural arrival time 1230 and facility location [facility].    Designated  policy reviewed. Instructed to have someone stay 24 hours post procedure.     Anticoagulation/blood thinners? ASA 81mg    Electronic implanted devices? no    Reviewed Golytely prep instructions with patient.     Pt was verbally upset during phone call and frustrated.  Pt voiced multiple complaints regarding the process of how information has been/has not been relayed, bowel prep, etc.  Pt requested to speak with supervisor.  RN reached out to supervisor Swetha Boswell.    July Partida RN

## 2022-03-07 NOTE — TELEPHONE ENCOUNTER
RN spoke with Jenny Salazar at [facility] who stated they will use pt's recent cardiology visit from 2.23.2022 as pre-op.    July Partida RN

## 2022-03-08 ENCOUNTER — ANESTHESIA (OUTPATIENT)
Dept: GASTROENTEROLOGY | Facility: CLINIC | Age: 57
End: 2022-03-08
Payer: COMMERCIAL

## 2022-03-08 ENCOUNTER — HOSPITAL ENCOUNTER (OUTPATIENT)
Facility: CLINIC | Age: 57
Discharge: HOME | End: 2022-03-08
Attending: INTERNAL MEDICINE | Admitting: INTERNAL MEDICINE
Payer: COMMERCIAL

## 2022-03-08 VITALS
TEMPERATURE: 97.5 F | HEIGHT: 68 IN | DIASTOLIC BLOOD PRESSURE: 79 MMHG | BODY MASS INDEX: 27.43 KG/M2 | OXYGEN SATURATION: 99 % | SYSTOLIC BLOOD PRESSURE: 142 MMHG | WEIGHT: 181 LBS | HEART RATE: 83 BPM | RESPIRATION RATE: 16 BRPM

## 2022-03-08 LAB
COLONOSCOPY: NORMAL
UPPER GI ENDOSCOPY: NORMAL

## 2022-03-08 PROCEDURE — 88305 TISSUE EXAM BY PATHOLOGIST: CPT | Mod: TC | Performed by: INTERNAL MEDICINE

## 2022-03-08 PROCEDURE — 250N000009 HC RX 250: Performed by: NURSE ANESTHETIST, CERTIFIED REGISTERED

## 2022-03-08 PROCEDURE — 250N000011 HC RX IP 250 OP 636: Performed by: NURSE ANESTHETIST, CERTIFIED REGISTERED

## 2022-03-08 PROCEDURE — 370N000017 HC ANESTHESIA TECHNICAL FEE, PER MIN: Performed by: INTERNAL MEDICINE

## 2022-03-08 PROCEDURE — 258N000003 HC RX IP 258 OP 636: Performed by: NURSE ANESTHETIST, CERTIFIED REGISTERED

## 2022-03-08 PROCEDURE — 43239 EGD BIOPSY SINGLE/MULTIPLE: CPT | Performed by: INTERNAL MEDICINE

## 2022-03-08 PROCEDURE — 45380 COLONOSCOPY AND BIOPSY: CPT | Performed by: INTERNAL MEDICINE

## 2022-03-08 RX ORDER — NALOXONE HYDROCHLORIDE 0.4 MG/ML
0.2 INJECTION, SOLUTION INTRAMUSCULAR; INTRAVENOUS; SUBCUTANEOUS
Status: DISCONTINUED | OUTPATIENT
Start: 2022-03-08 | End: 2022-03-08 | Stop reason: HOSPADM

## 2022-03-08 RX ORDER — ONDANSETRON 2 MG/ML
4 INJECTION INTRAMUSCULAR; INTRAVENOUS
Status: DISCONTINUED | OUTPATIENT
Start: 2022-03-08 | End: 2022-03-08 | Stop reason: HOSPADM

## 2022-03-08 RX ORDER — ONDANSETRON 2 MG/ML
4 INJECTION INTRAMUSCULAR; INTRAVENOUS EVERY 6 HOURS PRN
Status: DISCONTINUED | OUTPATIENT
Start: 2022-03-08 | End: 2022-03-08 | Stop reason: HOSPADM

## 2022-03-08 RX ORDER — SODIUM CHLORIDE, SODIUM LACTATE, POTASSIUM CHLORIDE, CALCIUM CHLORIDE 600; 310; 30; 20 MG/100ML; MG/100ML; MG/100ML; MG/100ML
INJECTION, SOLUTION INTRAVENOUS CONTINUOUS PRN
Status: DISCONTINUED | OUTPATIENT
Start: 2022-03-08 | End: 2022-03-08

## 2022-03-08 RX ORDER — NALOXONE HYDROCHLORIDE 0.4 MG/ML
0.4 INJECTION, SOLUTION INTRAMUSCULAR; INTRAVENOUS; SUBCUTANEOUS
Status: DISCONTINUED | OUTPATIENT
Start: 2022-03-08 | End: 2022-03-08 | Stop reason: HOSPADM

## 2022-03-08 RX ORDER — PROCHLORPERAZINE MALEATE 10 MG
10 TABLET ORAL EVERY 6 HOURS PRN
Status: DISCONTINUED | OUTPATIENT
Start: 2022-03-08 | End: 2022-03-08 | Stop reason: HOSPADM

## 2022-03-08 RX ORDER — FLUMAZENIL 0.1 MG/ML
0.2 INJECTION, SOLUTION INTRAVENOUS
Status: DISCONTINUED | OUTPATIENT
Start: 2022-03-08 | End: 2022-03-08 | Stop reason: HOSPADM

## 2022-03-08 RX ORDER — ONDANSETRON 4 MG/1
4 TABLET, ORALLY DISINTEGRATING ORAL EVERY 6 HOURS PRN
Status: DISCONTINUED | OUTPATIENT
Start: 2022-03-08 | End: 2022-03-08 | Stop reason: HOSPADM

## 2022-03-08 RX ORDER — PROPOFOL 10 MG/ML
INJECTION, EMULSION INTRAVENOUS CONTINUOUS PRN
Status: DISCONTINUED | OUTPATIENT
Start: 2022-03-08 | End: 2022-03-08

## 2022-03-08 RX ORDER — LIDOCAINE 40 MG/G
CREAM TOPICAL
Status: DISCONTINUED | OUTPATIENT
Start: 2022-03-08 | End: 2022-03-08 | Stop reason: HOSPADM

## 2022-03-08 RX ORDER — PROPOFOL 10 MG/ML
INJECTION, EMULSION INTRAVENOUS PRN
Status: DISCONTINUED | OUTPATIENT
Start: 2022-03-08 | End: 2022-03-08

## 2022-03-08 RX ADMIN — TOPICAL ANESTHETIC 1 SPRAY: 200 SPRAY DENTAL; PERIODONTAL at 15:49

## 2022-03-08 RX ADMIN — PROPOFOL 30 MG: 10 INJECTION, EMULSION INTRAVENOUS at 16:11

## 2022-03-08 RX ADMIN — PROPOFOL 150 MCG/KG/MIN: 10 INJECTION, EMULSION INTRAVENOUS at 15:48

## 2022-03-08 RX ADMIN — SODIUM CHLORIDE, POTASSIUM CHLORIDE, SODIUM LACTATE AND CALCIUM CHLORIDE: 600; 310; 30; 20 INJECTION, SOLUTION INTRAVENOUS at 15:45

## 2022-03-08 RX ADMIN — PROPOFOL 30 MG: 10 INJECTION, EMULSION INTRAVENOUS at 15:53

## 2022-03-08 NOTE — H&P
Everton Larios  [id number]  male  56 year old      Reason for procedure/surgery: barretts history of UC, EGD/colonoscopy    Patient Active Problem List   Diagnosis     H/O congenital atrial septal defect (ASD) repair at age 3     Ulcerative colitis (H)     Hypothyroidism due to medication     DJD (degenerative joint disease) - neck     Pierce's esophagus     Intermittent asthma without complication     Chronic rhinitis     Depression     Iron deficiency anemia     Heart replaced by transplant (H)     Debility     Abnormal white blood cell (WBC) count     Sepsis due to Pseudomonas aeruginosa (H)     Pulmonary nodules     Compression fracture of thoracic vertebra (H)     Central line clotted (H)     FRANKLIN (acute kidney injury) (H)     Transplanted heart (H)     C. difficile diarrhea     Encounter for therapeutic drug monitoring     Age-related osteoporosis with current pathological fracture with routine healing, subsequent encounter     Low TSH level     History of corticosteroid therapy     Status post coronary angiogram     Vasculopathy of cardiac allograft (H)       Past Surgical History:    Past Surgical History:   Procedure Laterality Date     ABLATION OF DYSRHYTHMIC FOCUS      for A fib     AICD, DUAL CHAMBER       ASD REPAIR  01/01/1968     BRONCHOSCOPY (RIGID OR FLEXIBLE), DIAGNOSTIC N/A 04/30/2019    Procedure: BRONCHOSCOPY, WITH BAL;  Surgeon: Margot Chiang MD;  Location: [facility]     CARDIAC DEFIBRILLATOR PLACEMENT      x2--last was Feb 2018     COLONOSCOPY N/A 02/20/2020    Procedure: COLONOSCOPY, WITH POLYPECTOMY AND BIOPSY;  Surgeon: Ranjeet Cooper MD;  Location:  GI     COLONOSCOPY N/A 02/05/2015    Procedure: COLONOSCOPY with biopsy;  Surgeon: Fernie Akers MD;  Location: [facility];  Service:      COLONOSCOPY N/A 12/19/2017    Procedure: COLONOSCOPY with biopsies and polypectomies using snare;  Surgeon: Gael Romero MD;  Location: [facility];  Service:      CV CORONARY  ANGIOGRAM N/A 02/12/2020    Procedure: CV CORONARY ANGIOGRAM;  Surgeon: Isiah Mcallister MD;  Location: [facility]     CV CORONARY ANGIOGRAM N/A 03/17/2021    Procedure: CV CORONARY ANGIOGRAM;  Surgeon: Santino Mckeon MD;  [facility]: [facility]     CV CORONARY ANGIOGRAM N/A 2/23/2022    Procedure: CV CORONARY ANGIOGRAM;  Surgeon: Yves Rodrigues MD;  [facility]     CV HEART BIOPSY N/A 03/04/2019    Procedure: Heart Biopsy;  Surgeon: Abhijeet Titus MD;  [facility]     CV HEART BIOPSY N/A 03/25/2019    Procedure: Heart Biopsy;  Surgeon: Yves Rodrigues MD;  [facility]: [facility]     CV HEART BIOPSY N/A 03/28/2019    Procedure: Heart Cath Heart Biopsy;  Surgeon: Yves Rodrigues MD;  [facility]     CV HEART BIOPSY N/A 04/10/2019    Procedure: CV HEART BIOPSY;  Surgeon: Isiah Mcallister MD;  [facility]     CV HEART BIOPSY N/A 04/24/2019    Procedure: CV HEART BIOPSY;  Surgeon: Isiah Mcallister MD;  [facility]     CV HEART BIOPSY N/A 05/08/2019    Procedure: CV HEART BIOPSY;  Surgeon: Isiah Mcallister MD;  [facility]     CV HEART BIOPSY N/A 06/04/2019    Procedure: CV HEART BIOPSY;  Surgeon: Alton Solomon MD;  [facility]     CV HEART BIOPSY N/A 05/22/2019    Procedure: CV HEART BIOPSY;  Surgeon: Yves Rodrigues MD;  [facility]     CV HEART BIOPSY N/A 07/17/2019    Procedure: CV HEART BIOPSY;  Surgeon: Isiah Mcallister MD;  [facility]     CV HEART BIOPSY N/A 08/13/2019    Procedure: CV HEART BIOPSY;  Surgeon: Jackson Patten MD;  [facility]     CV HEART BIOPSY N/A 10/15/2019    Procedure: CV HEART BIOPSY;  Surgeon: Santino Mckeon MD;  Location:  [facility]      CV HEART BIOPSY N/A 02/12/2020    Procedure: CV HEART BIOPSY;  Surgeon: Isiah Mcallister MD;  [facility]     CV HEART BIOPSY N/A 05/05/2020    Procedure: CV HEART BIOPSY;  Surgeon: Yves Rodrigues MD;  [facility]     CV HEART BIOPSY N/A 03/17/2021    Procedure: CV HEART BIOPSY;  Surgeon: Santino Mckeon MD;  Location:  [facility]     CV INTRA AORTIC BALLOON N/A 02/18/2019    Procedure: Intra-Aortic Balloon;  Surgeon: Alton Solomon MD;  Location:  [facility]ARD[facility]     CV INTRA AORTIC BALLOON N/A 02/20/2019    Procedure: Replace subclavian IABP;  Surgeon: Yves Rodrigues MD;  Location:  [facility]     CV INTRAVASULAR ULTRASOUND N/A 02/12/2020    Procedure: CV INTRAVASCULAR ULTRASOUND;  Surgeon: Isiah Mcallister MD;  Location:  [facility]     CV LEFT HEART CATH N/A 03/19/2019    Procedure: Left Heart Cath;  Surgeon: Yves Rodrigues MD;  Location:  [facility]     CV LEFT HEART CATH N/A 02/12/2020    Procedure: Left Heart Cath;  Surgeon: Isiah Mcallister MD;  Location:  [facility]     CV MYOCARDIAL BIOPSY N/A 03/19/2019    Procedure: RHC/HBx - Femoral access for 3/19 per Nimisha GONZALEZ;  Surgeon: Yves Rodrigues MD;  Location:  [facility]     CV MYOCARDIAL BIOPSY N/A 03/11/2019    Procedure: ADD ON RHC/HBX;  Surgeon: Alton Solomon MD;  Location:  [facility]     CV RIGHT HEART CATH MEASUREMENTS RECORDED N/A 03/25/2019    Procedure: Right Heart Cath;  Surgeon: Yves Rodrigues MD;  Location:  [facility]     CV RIGHT HEART CATH MEASUREMENTS RECORDED N/A 03/28/2019    Procedure: Heart Cath Right Heart Cath;  Surgeon: Yves Rodrigues MD;  Location:  [facility]     CV RIGHT HEART CATH MEASUREMENTS RECORDED N/A 04/24/2019    Procedure: CV RIGHT HEART CATH;  Surgeon: Isiah Mcallister MD;   Location:  [facility]     CV RIGHT HEART CATH MEASUREMENTS RECORDED N/A 06/04/2019    Procedure: CV RIGHT HEART CATH;  Surgeon: Alton Solomon MD;  Location:  [facility]     CV RIGHT HEART CATH MEASUREMENTS RECORDED N/A 05/22/2019    Procedure: CV RIGHT HEART CATH;  Surgeon: Yves Rodrigues MD;  Location:  [facility]     CV RIGHT HEART CATH MEASUREMENTS RECORDED N/A 08/13/2019    Procedure: CV RIGHT HEART CATH;  Surgeon: Jackson Patten MD;  Location:  [facility]ARD[facility]ATH LAB     CV RIGHT HEART CATH MEASUREMENTS RECORDED N/A 10/15/2019    Procedure: CV RIGHT HEART CATH;  Surgeon: Santino Mckeon MD;  Location:  [facility]ARD[facility]ATH [facility]     CV RIGHT HEART CATH MEASUREMENTS RECORDED N/A 02/12/2020    Procedure: CV RIGHT HEART CATH;  Surgeon: Isiah Mcallister MD;  Location:  [facility]ARD[facility]     CV RIGHT HEART CATH MEASUREMENTS RECORDED N/A 03/17/2021    Procedure: CV RIGHT HEART CATH;  Surgeon: Santino Mckeon MD;  Location:  [facility]     CV RIGHT HEART CATH MEASUREMENTS RECORDED N/A 2/23/2022    Procedure: CV RIGHT HEART CATH;  Surgeon: Yves Rodrigues MD;  Location: [facility]ARD[facility]     ESOPHAGOSCOPY, GASTROSCOPY, DUODENOSCOPY (EGD), COMBINED N/A 12/19/2017    Procedure: ESOPHAGOGASTRODUODENOSCOPY (EGD) with biopsies;  Surgeon: Gael Romero MD;  Location: [facility];  Service:      H STATISTIC PICC LINE INSERTION >5YR, FAILED Bilateral 10/28/2021    L sided SVC     HAND SURGERY Left      HC REVISE MEDIAN N/CARPAL TUNNEL SURG  09/21/2016    Procedure: OPEN RIGHT CARPAL TUNNEL RELEASE CORTISONE INJECTION RIGHT THUMB;  Surgeon: Duong Santoyo MD;  Location: [facility];  Service: Orthopedics     INCISION AND DRAINAGE CHEST WASHOUT, COMBINED N/A 03/21/2019    Procedure: Incision And Drainage; Evacuation Right Chest Wall Hematoma;  Surgeon: Dewayne House MD;  Location: [facility]      INSERT INTRAAORTIC BALLOON PUMP Left 2019    Procedure: Insert left  Subclavian Balloon Pump,  Removal Right femoral arterial balloom pump sheath;  Surgeon: Dewayne House MD;  [facility]     INSERT INTRAAORTIC BALLOON PUMP Left 2019    Procedure: SUBCLAVIAN BALLOON PUMP PLACEMENT;  Surgeon: Ben White MD;  [facility]     INSERT INTRACORONARY STENT      x2     IR PICC PLACEMENT > 5 YRS OF AGE  2019     TRANSPLANT HEART RECIPIENT N/A 2019    Procedure: TRANSPLANT HEART RECIPIENT;  Surgeon: Dewayne House MD;  [facility]       Past Medical History:   Past Medical History:   Diagnosis Date     Alcohol abuse      Arthritis     hands, neck     ASD (atrial septal defect)     s/p repair age 3     Asthma      Atrial fibrillation (H)      Pierce's esophagus 10/4/2018     Pierce's esophagus      Cataract      CHF (congestive heart failure) (H)      Chronic rhinitis 10/4/2018     Chronic systolic heart failure (H) 10/4/2018     Clotting disorder (H)      Congenital cardiomyopathy in  (H)      Depression 10/4/2018     Depression      Diastolic dysfunction      DJD (degenerative joint disease)     neck     DJD (degenerative joint disease) - neck 10/4/2018     H/O congenital atrial septal defect (ASD) repair at age 3 10/4/2018     History of anesthesia complications     nausea     History of transfusion      Hypothyroidism      Hypothyroidism due to medication 10/4/2018     ICD (implantable cardioverter-defibrillator) in place      ICD, [employer] ; gen change 2018 10/4/2018     Intermittent asthma without complication 10/4/2018     Nonischemic cardiomyopathy (H) 10/4/2018     On amiodarone therapy 10/4/2018     Pacemaker      Paroxysmal atrial fibrillation (H) 10/4/2018     S/P ablation of atrial fibrillation 10/4/2018     Systolic heart failure (H)      Ulcerative colitis (H)      Ulcerative colitis (H)      Ventricular tachycardia (H)  "10/4/2018     Ventricular tachycardia (H)        Social History:   Social History     Tobacco Use     Smoking status: Former Smoker     Packs/day: 1.00     Years: 25.00     Pack years: 25.00     Start date: 1980     Quit date: 2008     Years since quittin.3     Smokeless tobacco: Never Used   Substance Use Topics     Alcohol use: Yes     Alcohol/week: 1.0 - 2.0 standard drink     Types: 1 - 2 Cans of beer per week       Family History:   Family History   Problem Relation Age of Onset     Endometrial Cancer Mother      Osteoporosis Mother      Hypertension Father      Endometrial Cancer Maternal Grandmother      Hip fracture No family hx of      Nephrolithiasis No family hx of        Allergies:   Allergies   Allergen Reactions     Hydromorphone Other (See Comments)     Significant Delirium     Adhesive Tape Blisters     Tegaderm causes bruises, blisters and extreme irritation.     Lisinopril Other (See Comments)     hypotension     Quinolones Other (See Comments)     Would not rx quinolones until QTc interval improved.      Tobramycin Other (See Comments)     Would not use aminoglycosides as his kidney function is very borderline     Codeine Nausea       Active Medications:   No current outpatient medications on file.       Systemic Review:   CONSTITUTIONAL: NEGATIVE for fever, chills, change in weight  ENT/MOUTH: NEGATIVE for ear, mouth and throat problems  RESP: NEGATIVE for significant cough or SOB  CV: NEGATIVE for chest pain, palpitations or peripheral edema    Physical Examination:   Vital Signs: BP (!) 156/105   Temp 98  F (36.7  C) (Oral)   Resp 14   Ht 1.727 m (5' 8\")   Wt 82.1 kg (181 lb)   SpO2 100%   BMI 27.52 kg/m    GENERAL: healthy, alert and no distress  NECK: no adenopathy, no asymmetry, masses, or scars  RESP: lungs clear to auscultation - no rales, rhonchi or wheezes  CV: regular rate and rhythm, normal S1 S2, no S3 or S4, no murmur, click or rub, no peripheral edema and " peripheral pulses strong  ABDOMEN: soft, nontender, no hepatosplenomegaly, no masses and bowel sounds normal  MS: no gross musculoskeletal defects noted, no edema    Plan: Appropriate to proceed as scheduled.      Paddy Saldivar DO  3/8/2022    PCP:  Fredrick Wolff

## 2022-03-08 NOTE — ANESTHESIA CARE TRANSFER NOTE
Patient: Everton Larios    Procedure: Procedure(s):  ESOPHAGOGASTRODUODENOSCOPY, WITH BIOPSY  COLONOSCOPY, WITH POLYPECTOMY AND BIOPSY       Diagnosis: Ulcerative pancolitis without complication (H) [K51.00]  Diagnosis Additional Information: No value filed.    Anesthesia Type:   MAC     Note:    Oropharynx: oropharynx clear of all foreign objects and spontaneously breathing  Level of Consciousness: awake  Oxygen Supplementation: room air    Independent Airway: airway patency satisfactory and stable  Dentition: dentition unchanged  Vital Signs Stable: post-procedure vital signs reviewed and stable  Report to RN Given: handoff report given  Patient transferred to: Phase II    Handoff Report: Identifed the Patient, Identified the Reponsible Provider, Reviewed the pertinent medical history, Discussed the surgical course, Reviewed Intra-OP anesthesia mangement and issues during anesthesia, Set expectations for post-procedure period and Allowed opportunity for questions and acknowledgement of understanding      Vitals:  Vitals Value Taken Time   BP     Temp     Pulse     Resp     SpO2         Electronically Signed By: TAI Zamora CRNA  March 8, 2022  4:45 PM

## 2022-03-08 NOTE — ANESTHESIA PREPROCEDURE EVALUATION
Anesthesia Pre-Procedure Evaluation    Patient: Everton Larios   MRN: [id number] : 1965        Procedure : Procedure(s):  ESOPHAGOGASTRODUODENOSCOPY (EGD)  COLONOSCOPY          Past Medical History:   Diagnosis Date     Alcohol abuse      Arthritis     hands, neck     ASD (atrial septal defect)     s/p repair age 3     Asthma      Atrial fibrillation (H)      Pierce's esophagus 10/4/2018     Pierce's esophagus      Cataract      CHF (congestive heart failure) (H)      Chronic rhinitis 10/4/2018     Chronic systolic heart failure (H) 10/4/2018     Clotting disorder (H)      Congenital cardiomyopathy in  (H)      Depression 10/4/2018     Depression      Diastolic dysfunction      DJD (degenerative joint disease)     neck     DJD (degenerative joint disease) - neck 10/4/2018     H/O congenital atrial septal defect (ASD) repair at age 3 10/4/2018     History of anesthesia complications     nausea     History of transfusion      Hypothyroidism      Hypothyroidism due to medication 10/4/2018     ICD (implantable cardioverter-defibrillator) in place      ICD, [employer] ; gen change 2018 10/4/2018     Intermittent asthma without complication 10/4/2018     Nonischemic cardiomyopathy (H) 10/4/2018     On amiodarone therapy 10/4/2018     Pacemaker      Paroxysmal atrial fibrillation (H) 10/4/2018     S/P ablation of atrial fibrillation 10/4/2018     Systolic heart failure (H)      Ulcerative colitis (H)      Ulcerative colitis (H)      Ventricular tachycardia (H) 10/4/2018     Ventricular tachycardia (H)       Past Surgical History:   Procedure Laterality Date     ABLATION OF DYSRHYTHMIC FOCUS      for A fib     AICD, DUAL CHAMBER       ASD REPAIR  1968     BRONCHOSCOPY (RIGID OR FLEXIBLE), DIAGNOSTIC N/A 2019    Procedure: BRONCHOSCOPY, WITH BAL;  Surgeon: Margot Chiang MD;  Location:  GI     CARDIAC DEFIBRILLATOR PLACEMENT      x2--last was 2018     COLONOSCOPY N/A  02/20/2020    Procedure: COLONOSCOPY, WITH POLYPECTOMY AND BIOPSY;  Surgeon: Ranjeet Cooper MD;  Location:  GI     COLONOSCOPY N/A 02/05/2015    Procedure: COLONOSCOPY with biopsy;  Surgeon: Fernie Akers MD;  Location: [facility];  Service:      COLONOSCOPY N/A 12/19/2017    Procedure: COLONOSCOPY with biopsies and polypectomies using snare;  Surgeon: Gael Romero MD;  Location: [facility];  Service:      CV CORONARY ANGIOGRAM N/A 02/12/2020    Procedure: CV CORONARY ANGIOGRAM;  Surgeon: Isiah Mcallister MD;  Location:  HEART CARDIAC CATH LAB     CV CORONARY ANGIOGRAM N/A 03/17/2021    Procedure: CV CORONARY ANGIOGRAM;  Surgeon: Santino Mckeon MD;  Location:  HE[facility]ARD[facility]ATH LAB     CV CORONARY ANGIOGRAM N/A 2/23/2022    Procedure: CV CORONARY ANGIOGRAM;  Surgeon: Yves Rodrigues MD;  Location:  [facility]ARD[facility]ATH LAB     CV HEART BIOPSY N/A 03/04/2019    Procedure: Heart Biopsy;  Surgeon: Abhijeet Titus MD;  [facility]     CV HEART BIOPSY N/A 03/25/2019    Procedure: Heart Biopsy;  Surgeon: Yves Rodrigues MD;  [facility]     CV HEART BIOPSY N/A 03/28/2019    Procedure: Heart Cath Heart Biopsy;  Surgeon: Yves Rodrigues MD;  [facility]     CV HEART BIOPSY N/A 04/10/2019    Procedure: CV HEART BIOPSY;  Surgeon: Isiah Mcallister MD;  [facility]     CV HEART BIOPSY N/A 04/24/2019    Procedure: CV HEART BIOPSY;  Surgeon: Isiah Mcallister MD;  [facility]     CV HEART BIOPSY N/A 05/08/2019    Procedure: CV HEART BIOPSY;  Surgeon: Isiah Mcallister MD;  [facility]     CV HEART BIOPSY N/A 06/04/2019    Procedure: CV HEART BIOPSY;  Surgeon: Alton Solomon MD;  [facility]     CV HEART BIOPSY N/A 05/22/2019    Procedure: CV HEART BIOPSY;  Surgeon: Yves Rodrigues MD;   Location:  [facility]     CV HEART BIOPSY N/A 07/17/2019    Procedure: CV HEART BIOPSY;  Surgeon: Isiah Mcallister MD;  Location:  [facility]     CV HEART BIOPSY N/A 08/13/2019    Procedure: CV HEART BIOPSY;  Surgeon: Jackson Patten MD;  [facility]     CV HEART BIOPSY N/A 10/15/2019    Procedure: CV HEART BIOPSY;  Surgeon: Santino Mckeon MD;  Location:  [facility]ARD[facility] CATH [facility]     CV HEART BIOPSY N/A 02/12/2020    Procedure: CV HEART BIOPSY;  Surgeon: Isiah Mcallister MD;  Location:  [facility]ARD[facility] CATH LAB     CV HEART BIOPSY N/A 05/05/2020    Procedure: CV HEART BIOPSY;  Surgeon: Yves Rodrigues MD;  Location:  [facility]ARD[facility] CATH LAB     CV HEART BIOPSY N/A 03/17/2021    Procedure: CV HEART BIOPSY;  Surgeon: Santino Mckeon MD;  Location:  HE[facility] CARDI[facility] CATH LAB     CV INTRA AORTIC BALLOON N/A 02/18/2019    Procedure: Intra-Aortic Balloon;  Surgeon: Alton Solomon MD;  Location:  HE[facility] CARDIAC CATH LAB     CV INTRA AORTIC BALLOON N/A 02/20/2019    Procedure: Replace subclavian IABP;  Surgeon: Yves Rodrigues MD;  Location:  HE[facility] CARDIAC CATH LAB     CV INTRAVASULAR ULTRASOUND N/A 02/12/2020    Procedure: CV INTRAVASCULAR ULTRASOUND;  Surgeon: Isiah Mcallister MD;  Location:  HE[facility] CARDIAC CATH LAB     CV LEFT HEART CATH N/A 03/19/2019    Procedure: Left Heart Cath;  Surgeon: Yves Rodrigues MD;  Location:  HE[facility]ARDIAC CATH LAB     CV LEFT HEART CATH N/A 02/12/2020    Procedure: Left Heart Cath;  Surgeon: Isiah Mcallister MD;  Location:  [facility]ARD[facility] CATH LAB     CV MYOCARDIAL BIOPSY N/A 03/19/2019    Procedure: RHC/HBx - Femoral access for 3/19 per Nimisha GONZALEZ;  Surgeon: Yves Rodrigues MD;  Location:  [facility]ARDI[facility] CATH LAB     CV MYOCARDIAL BIOPSY N/A 03/11/2019    Procedure: ADD ON RHC/HBX;  Surgeon: Alton Solomon MD;  Location:  HE[facility]ARD[facility] CATH LAB     CV RIGHT HEART CATH  MEASUREMENTS RECORDED N/A 03/25/2019    Procedure: Right Heart Cath;  Surgeon: Yves Rodrigues MD;  Location:  HE[facility]ARD[facility]ATH LAB     CV RIGHT HEART CATH MEASUREMENTS RECORDED N/A 03/28/2019    Procedure: Heart Cath Right Heart Cath;  Surgeon: Yves Rodrigues MD;  Location:  [facility]ARD[facility]ATH LAB     CV RIGHT HEART CATH MEASUREMENTS RECORDED N/A 04/24/2019    Procedure: CV RIGHT HEART CATH;  Surgeon: Isiah Mcallister MD;  Location:  [facility]ARD[facility] CATH LAB     CV RIGHT HEART CATH MEASUREMENTS RECORDED N/A 06/04/2019    Procedure: CV RIGHT HEART CATH;  Surgeon: Alton Solomon MD;  Location:  HE[facility] CARDIAC CATH LAB     CV RIGHT HEART CATH MEASUREMENTS RECORDED N/A 05/22/2019    Procedure: CV RIGHT HEART CATH;  Surgeon: Yves Rodrigues MD;  Location:  HE[facility] CARDIAC CATH LAB     CV RIGHT HEART CATH MEASUREMENTS RECORDED N/A 08/13/2019    Procedure: CV RIGHT HEART CATH;  Surgeon: Jackson Patten MD;  Location:  HEART CARDIAC CATH LAB     CV RIGHT HEART CATH MEASUREMENTS RECORDED N/A 10/15/2019    Procedure: CV RIGHT HEART CATH;  Surgeon: Santino Mckeon MD;  Location:  HE[facility] CARDIAC CATH LAB     CV RIGHT HEART CATH MEASUREMENTS RECORDED N/A 02/12/2020    Procedure: CV RIGHT HEART CATH;  Surgeon: Isiah Mcallister MD;  Location:  HE[facility] CARDIAC CATH LAB     CV RIGHT HEART CATH MEASUREMENTS RECORDED N/A 03/17/2021    Procedure: CV RIGHT HEART CATH;  Surgeon: Santino Mckeon MD;  Location:  HEART CARDIAC CATH LAB     CV RIGHT HEART CATH MEASUREMENTS RECORDED N/A 2/23/2022    Procedure: CV RIGHT HEART CATH;  Surgeon: Yves Rodrigues MD;  Location:  HEART CARDIAC CATH LAB     ESOPHAGOSCOPY, GASTROSCOPY, DUODENOSCOPY (EGD), COMBINED N/A 12/19/2017    Procedure: ESOPHAGOGASTRODUODENOSCOPY (EGD) with biopsies;  Surgeon: Gael Romero MD;  Location: [facility];  Service:      H STATISTIC PICC LINE INSERTION >5YR, FAILED Bilateral 10/28/2021    L sided  SVC     HAND SURGERY Left      HC REVISE MEDIAN N/CARPAL TUNNEL SURG  2016    Procedure: OPEN RIGHT CARPAL TUNNEL RELEASE CORTISONE INJECTION RIGHT THUMB;  Surgeon: Duong Santoyo MD;  Location: [facility];  Service: Orthopedics     INCISION AND DRAINAGE CHEST WASHOUT, COMBINED N/A 2019    Procedure: Incision And Drainage; Evacuation Right Chest Wall Hematoma;  Surgeon: Dewayne House MD;  [facility]     INSERT INTRAAORTIC BALLOON PUMP Left 2019    Procedure: Insert left  Subclavian Balloon Pump,  Removal Right femoral arterial balloom pump sheath;  Surgeon: Dewayne House MD;  [facility]     INSERT INTRAAORTIC BALLOON PUMP Left 2019    Procedure: SUBCLAVIAN BALLOON PUMP PLACEMENT;  Surgeon: Ben White MD;  [facility]     INSERT INTRACORONARY STENT      x2     IR PICC PLACEMENT > 5 YRS OF AGE  2019     TRANSPLANT HEART RECIPIENT N/A 2019    Procedure: TRANSPLANT HEART RECIPIENT;  Surgeon: Dewayne House MD;  Location: [facility]      Allergies   Allergen Reactions     Hydromorphone Other (See Comments)     Significant Delirium     Adhesive Tape Blisters     Tegaderm causes bruises, blisters and extreme irritation.     Lisinopril Other (See Comments)     hypotension     Quinolones Other (See Comments)     Would not rx quinolones until QTc interval improved.      Tobramycin Other (See Comments)     Would not use aminoglycosides as his kidney function is very borderline     Codeine Nausea      Social History     Tobacco Use     Smoking status: Former Smoker     Packs/day: 1.00     Years: 25.00     Pack years: 25.00     Start date: 1980     Quit date: 2008     Years since quittin.3     Smokeless tobacco: Never Used   Substance Use Topics     Alcohol use: Yes     Alcohol/week: 1.0 - 2.0 standard drink     Types: 1 - 2 Cans of beer per week      Wt Readings from Last 1 Encounters:   22 82.1 kg (181 lb)        Anesthesia  Evaluation   Pt has had prior anesthetic.     No history of anesthetic complications       ROS/MED HX  ENT/Pulmonary:     (+) asthma Treatment: Inhaler prn,      Neurologic: Comment: Hx of T-spine compression fx, no issues      Cardiovascular: Comment: Hx of OHT 2019    RHC/HLC 2/23/2022:  Clean coronaries  Right side pressures slightly elevated      METS/Exercise Tolerance:     Hematologic:       Musculoskeletal:       GI/Hepatic: Comment: Ulcerative colitis  Hx of carrera's esophagus    (+) GERD,     Renal/Genitourinary:       Endo:     (+) thyroid problem,     Psychiatric/Substance Use:       Infectious Disease:       Malignancy:       Other:            Physical Exam    Airway        Mallampati: II       Respiratory Devices and Support         Dental  no notable dental history         Cardiovascular          Rhythm and rate: regular and tachycardia     Pulmonary           breath sounds clear to auscultation           OUTSIDE LABS:  CBC:   Lab Results   Component Value Date    WBC 5.1 02/23/2022    WBC 5.2 01/09/2022    HGB 11.6 (L) 02/23/2022    HGB 12.0 (L) 02/23/2022    HCT 37.9 (L) 02/23/2022    HCT 35.2 (L) 01/09/2022     02/23/2022     01/09/2022     BMP:   Lab Results   Component Value Date     02/23/2022     01/09/2022    POTASSIUM 3.6 02/23/2022    POTASSIUM 4.2 01/09/2022    CHLORIDE 108 02/23/2022    CHLORIDE 108 01/09/2022    CO2 22 02/23/2022    CO2 25 01/09/2022    BUN 31 (H) 02/23/2022    BUN 28 01/09/2022    CR 1.94 (H) 02/23/2022    CR 1.80 (H) 01/09/2022    GLC 92 02/23/2022     (H) 01/09/2022     COAGS:   Lab Results   Component Value Date    PTT 35 09/23/2019    INR 1.17 (H) 09/23/2019    FIBR 189 (L) 03/14/2019     POC:   Lab Results   Component Value Date    BGM 99 03/29/2019     HEPATIC:   Lab Results   Component Value Date    ALBUMIN 3.2 (L) 02/23/2022    PROTTOTAL 7.8 02/23/2022    ALT 31 02/23/2022    AST 24 02/23/2022     (H) 09/17/2019    ALKPHOS  113 02/23/2022    BILITOTAL 0.4 02/23/2022     OTHER:   Lab Results   Component Value Date    PH 7.32 (L) 02/25/2019    LACT 1.2 09/23/2019    A1C 5.5 02/23/2022    RADAMES 9.1 02/23/2022    PHOS 3.3 02/23/2022    MAG 1.8 02/23/2022    LIPASE 151 09/23/2019    AMYLASE 51 03/18/2019    TSH 0.33 (L) 02/23/2022    T4 1.06 02/23/2022    T3 102 01/16/2020    CRP <2.9 09/24/2019    SED 17 09/23/2019       Anesthesia Plan    ASA Status:  3   NPO Status:  NPO Appropriate    Anesthesia Type: MAC.              Consents    Anesthesia Plan(s) and associated risks, benefits, and realistic alternatives discussed. Questions answered and patient/representative(s) expressed understanding.    - Discussed:     - Discussed with:  Patient      - Extended Intubation/Ventilatory Support Discussed: No.      - Patient is DNR/DNI Status: No    Use of blood products discussed: No .     Postoperative Care            Comments:                Bairon Tom MD

## 2022-03-10 LAB
PATH REPORT.COMMENTS IMP SPEC: NORMAL
PATH REPORT.COMMENTS IMP SPEC: NORMAL
PATH REPORT.FINAL DX SPEC: NORMAL
PATH REPORT.GROSS SPEC: NORMAL
PATH REPORT.MICROSCOPIC SPEC OTHER STN: NORMAL
PATH REPORT.RELEVANT HX SPEC: NORMAL
PHOTO IMAGE: NORMAL

## 2022-03-10 PROCEDURE — 88305 TISSUE EXAM BY PATHOLOGIST: CPT | Mod: 26 | Performed by: PATHOLOGY

## 2022-03-15 DIAGNOSIS — Z94.1 HEART REPLACED BY TRANSPLANT (H): ICD-10-CM

## 2022-03-15 RX ORDER — TACROLIMUS 1 MG/1
CAPSULE ORAL
Qty: 360 CAPSULE | Refills: 11 | Status: SHIPPED | OUTPATIENT
Start: 2022-03-15 | End: 2022-04-01 | Stop reason: ALTCHOICE

## 2022-03-15 NOTE — TELEPHONE ENCOUNTER
[facility] Call Center    Phone Message    May a detailed message be left on voicemail: yes     Reason for Call: Medication Question or concern regarding medication   Prescription Clarification  Name of Medication: tacrolimus (GENERIC EQUIVALENT) 1 MG capsule  Prescribing Provider: Dr Corinna Donis   Pharmacy:    [facility] [address] [place] [address]   What on the order needs clarification? There are two sets of instructions and the pharmacy wants to know if the pt is to take 2 or 3 capsules BID before filling     Action Taken: Other: cardiology    Travel Screening: Not Applicable

## 2022-03-16 ENCOUNTER — OFFICE VISIT (OUTPATIENT)
Dept: CARDIOLOGY | Facility: CLINIC | Age: 57
End: 2022-03-16
Attending: INTERNAL MEDICINE
Payer: COMMERCIAL

## 2022-03-16 VITALS
DIASTOLIC BLOOD PRESSURE: 100 MMHG | OXYGEN SATURATION: 98 % | HEIGHT: 68 IN | WEIGHT: 187 LBS | HEART RATE: 110 BPM | BODY MASS INDEX: 28.34 KG/M2 | SYSTOLIC BLOOD PRESSURE: 155 MMHG

## 2022-03-16 DIAGNOSIS — Z94.1 TRANSPLANTED HEART (H): Primary | ICD-10-CM

## 2022-03-16 PROCEDURE — G0463 HOSPITAL OUTPT CLINIC VISIT: HCPCS

## 2022-03-16 PROCEDURE — 99215 OFFICE O/P EST HI 40 MIN: CPT | Performed by: INTERNAL MEDICINE

## 2022-03-16 RX ORDER — AZATHIOPRINE 50 MG/1
100 TABLET ORAL DAILY
Qty: 180 TABLET | Refills: 3 | Status: SHIPPED | OUTPATIENT
Start: 2022-03-16 | End: 2022-03-25

## 2022-03-16 RX ORDER — LOSARTAN POTASSIUM 25 MG/1
50 TABLET ORAL DAILY
Qty: 90 TABLET | Refills: 3 | Status: SHIPPED | OUTPATIENT
Start: 2022-03-16 | End: 2022-03-25

## 2022-03-16 ASSESSMENT — PAIN SCALES - GENERAL: PAINLEVEL: NO PAIN (0)

## 2022-03-16 NOTE — NURSING NOTE
Chief Complaint   Patient presents with     Follow Up     return heart tx     Vitals were taken and medications reconciled.    Melvin Ferguson, EMT  4:30 PM

## 2022-03-16 NOTE — NURSING NOTE
Transplant Coordinator Note    Reason for visit: 3rd annual. Testing completed 2/23.   Coordinator: Present     Angio: 2/23 clear  No DSAS (early history of dsas)  No rejection  Last immuknow 305 in 2020. 2021, it was done as inpatient and not done correctly. This year not performed due to a shipping issue.     Health concerns addressed today:  1. CR = 1.9 (was 1.5-1.7)  2. Immuknow not performed due to a shipping issue.   3. RA: 10  RV: 31 (11)  PA: 30/14 (21)  PVR: 0.91  Jaden CO: 6.3    CI: 3.3  4. BP. Pt states he is taking amlodipine. Dr. Donis mentioned starting losartan 25 mg daily.   5. Tac/rapa ->Check to see when he is taking meds. Be sure they are 4 hours apart.   6. Dr. Donis mentioned the following: Keep close eye on tac. If it continues to increase (cr above 1.5) switch back to 1500 bid MMF?? Hbx 1 month after.  7. Increased crestor to 20 mg daily for elevated cholesterol.    8. Hx of cmv viremia. Recently undetected.     Immunosuppressants:  tacro goal 3-5 last level 5.2. Current dose Recently decreased to 2 mg bid. Pt didn't do repeat testing.     Rapa goal 6-8 Last level 6.3. Current dose 4 mg daily.     Routine screenings:    Derm: 7/2021  Dental: DUE  Colonoscopy: 3/8/2022  EGD: 3/8/2022, every 3 years.   Prostate:  0.69  Eye: DUE  Flu/Pneumonia: Update.   Covid: 3/3       Recent rhc/angio/labs/cardiac MRI reviewed with patient  Medication record reviewed and reconciled  Questions and concerns addressed  Pt verbalized an understanding of plan of care.     Patient Instructions  1. Please start losartan 25 mg once a day.   2. Please monitor your BP and let me know if consistently >140/90.   3. Please continue to watch diet, avoid greasy, fatty foods. Avoid alcohol. We will recheck your lipids with your next appointments.   4. Once you receive imuran, contact me and we will go over transitioning to tacro.   5. A prescription sent for imuran 100 mg once a day.   6. You will need a rhc/hbx/echo in 1  month. At that time we will get immuknow lipid, and dsas.     Next transplant clinic appointment:  1 month after transitioning to Imuran  Next lab draw: TBD.  Coordinator will call with all pending results.     Please call transplant coordinator with any questions:    Jocelynn Jerez RN BSN   Post Heart Transplant Nurse Coordinator  [facility]  Questions: [phone]

## 2022-03-16 NOTE — PROGRESS NOTES
HPI:  Everton Larios is a 56 year old male with a history of ASD (s/p repair in childhood), AFib/AF (s/p ablation), NICM, diastolic dysfunction, alcohol abuse, Pierce's esophagus, ulcerative colitis, GIB (s/p splenic embolization), and hypothyroidism, now s/p OHT and bypass of persistent left SVC to right atrial appendage 19 who presents to clinic for ongoing evaluation and management. His postoperative course was c/b shock due to RV dysfunction requiring IABP support, small pneumoperitoneum (clinically insignificant), chest wall hematoma (former ICD site, s/p evacuation and drain placement 3/31/19), HCAP/klebsiella pneumonia, and FRANKLIN (required short-term HD, now recovered).  His biopsy 3/25/19 showed mild AMR1 with some staining for c4d. Repeat biopsy 3/28 showed improved AMR and less reactive capillary staining, and subsequent biopsies have now shown resolution of AMR and improved capillary staining.  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        Today patient reports that has been feeling well overall.  He denies any chest pain or pressure, sob/andrade, orthopnea, pnd, palpitations, syncope/presyncope or marielle.  He any denies fever/chills, sore throat, swollen lymph nodes, productive cough, abdominal pain, or N/V/D.  He also denies any problems with his medications and reports compliance.      Past Medical History:   Diagnosis Date     Alcohol abuse      Arthritis     hands, neck     ASD (atrial septal defect)     s/p repair age 3     Asthma      Atrial fibrillation (H)      Pierce's esophagus 10/4/2018     Pierce's esophagus      Cataract      CHF (congestive heart failure) (H)      Chronic rhinitis 10/4/2018     Chronic systolic heart failure (H) 10/4/2018     Clotting disorder (H)      Congenital cardiomyopathy in  (H)      Depression 10/4/2018      Depression      Diastolic dysfunction      DJD (degenerative joint disease)     neck     DJD (degenerative joint disease) - neck 10/4/2018     H/O congenital atrial septal defect (ASD) repair at age 3 10/4/2018     History of anesthesia complications     nausea     History of transfusion      Hypothyroidism      Hypothyroidism due to medication 10/4/2018     ICD (implantable cardioverter-defibrillator) in place      ICD, [employer] 2008; gen change 2/2018 10/4/2018     Intermittent asthma without complication 10/4/2018     Nonischemic cardiomyopathy (H) 10/4/2018     On amiodarone therapy 10/4/2018     Pacemaker      Paroxysmal atrial fibrillation (H) 10/4/2018     S/P ablation of atrial fibrillation 10/4/2018     Systolic heart failure (H)      Ulcerative colitis (H) 2005     Ulcerative colitis (H)      Ventricular tachycardia (H) 10/4/2018     Ventricular tachycardia (H)        Past Surgical History:   Procedure Laterality Date     ABLATION OF DYSRHYTHMIC FOCUS      for A fib     AICD, DUAL CHAMBER       ASD REPAIR  01/01/1968     BRONCHOSCOPY (RIGID OR FLEXIBLE), DIAGNOSTIC N/A 04/30/2019    Procedure: BRONCHOSCOPY, WITH BAL;  Surgeon: Margot Chiang MD;  Location:  GI     CARDIAC DEFIBRILLATOR PLACEMENT      x2--last was Feb 2018     COLONOSCOPY N/A 02/20/2020    Procedure: COLONOSCOPY, WITH POLYPECTOMY AND BIOPSY;  Surgeon: Ranjeet Cooper MD;  Location:  GI     COLONOSCOPY N/A 02/05/2015    Procedure: COLONOSCOPY with biopsy;  Surgeon: Fernie Akers MD;  Location: [facility];  Service:      COLONOSCOPY N/A 12/19/2017    Procedure: COLONOSCOPY with biopsies and polypectomies using snare;  Surgeon: Gael Romero MD;  Location: [facility];  Service:      COLONOSCOPY N/A 3/8/2022    Procedure: COLONOSCOPY, WITH POLYPECTOMY AND BIOPSY;  Surgeon: Paddy Saldivar DO;  Location:  GI     CV CORONARY ANGIOGRAM N/A 02/12/2020    Procedure: CV CORONARY ANGIOGRAM;  Surgeon: Esvin  Isiah Mensah MD;  [facility]     CV CORONARY ANGIOGRAM N/A 03/17/2021    Procedure: CV CORONARY ANGIOGRAM;  Surgeon: Santino Mckeon MD;  [facility]     CV CORONARY ANGIOGRAM N/A 2/23/2022    Procedure: CV CORONARY ANGIOGRAM;  Surgeon: Yves Rodrigues MD;  [facility]     CV HEART BIOPSY N/A 03/04/2019    Procedure: Heart Biopsy;  Surgeon: Abhijeet Titus MD;  [facility]     CV HEART BIOPSY N/A 03/25/2019    Procedure: Heart Biopsy;  Surgeon: Yves Rodrigues MD;  [facility]     CV HEART BIOPSY N/A 03/28/2019    Procedure: Heart Cath Heart Biopsy;  Surgeon: Yves Rodrigues MD;  [facility]     CV HEART BIOPSY N/A 04/10/2019    Procedure: CV HEART BIOPSY;  Surgeon: Isiah Mcallister MD;  [facility]     CV HEART BIOPSY N/A 04/24/2019    Procedure: CV HEART BIOPSY;  Surgeon: Isiah Mcallister MD;  [facility]     CV HEART BIOPSY N/A 05/08/2019    Procedure: CV HEART BIOPSY;  Surgeon: Isiah Mcallister MD;  [facility]     CV HEART BIOPSY N/A 06/04/2019    Procedure: CV HEART BIOPSY;  Surgeon: Alton Solomon MD;  [facility]     CV HEART BIOPSY N/A 05/22/2019    Procedure: CV HEART BIOPSY;  Surgeon: Yves Rodrigues MD;  [facility]     CV HEART BIOPSY N/A 07/17/2019    Procedure: CV HEART BIOPSY;  Surgeon: Isiah Mcallister MD;  [facility]     CV HEART BIOPSY N/A 08/13/2019    Procedure: CV HEART BIOPSY;  Surgeon: Jackson Patten MD;  [facility]     CV HEART BIOPSY N/A 10/15/2019    Procedure: CV HEART BIOPSY;  Surgeon: Santino Mckeon MD;  [facility]     CV HEART BIOPSY N/A 02/12/2020    Procedure: CV HEART BIOPSY;  Surgeon:  Isiah Mcallister MD;  Location:  [facility]     CV HEART BIOPSY N/A 05/05/2020    Procedure: CV HEART BIOPSY;  Surgeon: Yves Rodrigues MD;  [facility]     CV HEART BIOPSY N/A 03/17/2021    Procedure: CV HEART BIOPSY;  Surgeon: Santino Mckeon MD;  Location:  [facility]     CV INTRA AORTIC BALLOON N/A 02/18/2019    Procedure: Intra-Aortic Balloon;  Surgeon: Alton Solomon MD;  Location:  [facility]     CV INTRA AORTIC BALLOON N/A 02/20/2019    Procedure: Replace subclavian IABP;  Surgeon: Yves Rodrigues MD;  Location:  [facility]     CV INTRAVASULAR ULTRASOUND N/A 02/12/2020    Procedure: CV INTRAVASCULAR ULTRASOUND;  Surgeon: Isiah Mcallister MD;  Location:  [facility]     CV LEFT HEART CATH N/A 03/19/2019    Procedure: Left Heart Cath;  Surgeon: Yves Rodrigues MD;  Location:  [facility]     CV LEFT HEART CATH N/A 02/12/2020    Procedure: Left Heart Cath;  Surgeon: Isiah Mcallister MD;  Location:  [facility]     CV MYOCARDIAL BIOPSY N/A 03/19/2019    Procedure: RHC/HBx - Femoral access for 3/19 per Nimisha GONZALEZ;  Surgeon: Yves Rodrigues MD;  Location:  [facility]     CV MYOCARDIAL BIOPSY N/A 03/11/2019    Procedure: ADD ON RHC/HBX;  Surgeon: Alton Solomon MD;  Location:  [facility]     CV RIGHT HEART CATH MEASUREMENTS RECORDED N/A 03/25/2019    Procedure: Right Heart Cath;  Surgeon: Yves Rodrigues MD;  Location:  [facility]     CV RIGHT HEART CATH MEASUREMENTS RECORDED N/A 03/28/2019    Procedure: Heart Cath Right Heart Cath;  Surgeon: Yves Rodrigues MD;  Location:  [facility]     CV RIGHT HEART CATH MEASUREMENTS RECORDED N/A 04/24/2019    Procedure: CV RIGHT HEART CATH;  Surgeon: Isiah Mcallister MD;  Location:  [facility]     CV RIGHT HEART CATH MEASUREMENTS RECORDED  N/A 06/04/2019    Procedure: CV RIGHT HEART CATH;  Surgeon: Alton Solomon MD;  Location:  [facility]     CV RIGHT HEART CATH MEASUREMENTS RECORDED N/A 05/22/2019    Procedure: CV RIGHT HEART CATH;  Surgeon: Yves Rodrigues MD;  Location:  [facility]     CV RIGHT HEART CATH MEASUREMENTS RECORDED N/A 08/13/2019    Procedure: CV RIGHT HEART CATH;  Surgeon: Jackson Patten MD;  Location:  [facility]     CV RIGHT HEART CATH MEASUREMENTS RECORDED N/A 10/15/2019    Procedure: CV RIGHT HEART CATH;  Surgeon: Santino Mckeon MD;  Location:  [facility]     CV RIGHT HEART CATH MEASUREMENTS RECORDED N/A 02/12/2020    Procedure: CV RIGHT HEART CATH;  Surgeon: Isiah Mcallister MD;  Location:  [facility]     CV RIGHT HEART CATH MEASUREMENTS RECORDED N/A 03/17/2021    Procedure: CV RIGHT HEART CATH;  Surgeon: Santino Mckeon MD;  Location:  [facility]     CV RIGHT HEART CATH MEASUREMENTS RECORDED N/A 2/23/2022    Procedure: CV RIGHT HEART CATH;  Surgeon: Yves Rodrigues MD;  Location:  [facility]     ESOPHAGOSCOPY, GASTROSCOPY, DUODENOSCOPY (EGD), COMBINED N/A 12/19/2017    Procedure: ESOPHAGOGASTRODUODENOSCOPY (EGD) with biopsies;  Surgeon: Gael Romero MD;  Location: [facility];  Service:      ESOPHAGOSCOPY, GASTROSCOPY, DUODENOSCOPY (EGD), COMBINED N/A 3/8/2022    Procedure: ESOPHAGOGASTRODUODENOSCOPY, WITH BIOPSY;  Surgeon: Paddy Saldivar DO;  Location: [facility]     H STATISTIC PICC LINE INSERTION >5YR, FAILED Bilateral 10/28/2021    L sided SVC     HAND SURGERY Left      HC REVISE MEDIAN N/CARPAL TUNNEL SURG  09/21/2016    Procedure: OPEN RIGHT CARPAL TUNNEL RELEASE CORTISONE INJECTION RIGHT THUMB;  Surgeon: Duong Santoyo MD;  Location: [facility] OR;  Service: Orthopedics     INCISION AND DRAINAGE CHEST WASHOUT, COMBINED N/A 03/21/2019    Procedure: Incision And Drainage;  Evacuation Right Chest Wall Hematoma;  Surgeon: Dewayne House MD;  [facility]     INSERT INTRAAORTIC BALLOON PUMP Left 2019    Procedure: Insert left  Subclavian Balloon Pump,  Removal Right femoral arterial balloom pump sheath;  Surgeon: Dewayne House MD;  [facility]     INSERT INTRAAORTIC BALLOON PUMP Left 2019    Procedure: SUBCLAVIAN BALLOON PUMP PLACEMENT;  Surgeon: Ben White MD;  [facility]     INSERT INTRACORONARY STENT      x2     IR PICC PLACEMENT > 5 YRS OF AGE  2019     TRANSPLANT HEART RECIPIENT N/A 2019    Procedure: TRANSPLANT HEART RECIPIENT;  Surgeon: Dewayne House MD;  [facility]       Family History   Problem Relation Age of Onset     Endometrial Cancer Mother      Osteoporosis Mother      Hypertension Father      Endometrial Cancer Maternal Grandmother      Hip fracture No family hx of      Nephrolithiasis No family hx of        Social History     Tobacco Use     Smoking status: Former Smoker     Packs/day: 1.00     Years: 25.00     Pack years: 25.00     Start date: 1980     Quit date: 2008     Years since quittin.3     Smokeless tobacco: Never Used   Substance Use Topics     Alcohol use: Yes     Alcohol/week: 1.0 - 2.0 standard drink     Types: 1 - 2 Cans of beer per week       MEDICATIONS:  acetaminophen (TYLENOL) 325 MG tablet, Take 2 tablets (650 mg) by mouth every 4 hours as needed for mild pain  amLODIPine (NORVASC) 5 MG tablet, TAKE 2 TABLETS BY MOUTH EVERY DAY  aspirin (ASA) 81 MG chewable tablet, Take 1 tablet (81 mg) by mouth daily  calcium carbonate 600 mg-vitamin D 400 units (CALTRATE) 600-400 MG-UNIT per tablet, Take 1 tablet by mouth 2 times daily (with meals)  cetirizine (ZYRTEC) 10 MG tablet, Take 10 mg by mouth daily  DULoxetine (CYMBALTA) 20 MG capsule, Take 1 capsule (20 mg) by mouth daily  levothyroxine (SYNTHROID/LEVOTHROID) 75 MCG tablet, Take 1 tablet (75 mcg) by mouth  "daily  melatonin 3 MG tablet, Take 2 tablets (6 mg) by mouth At Bedtime (Patient taking differently: Take 5 mg by mouth nightly as needed )  multivitamin w/minerals (THERA-VIT-M) tablet, Take 1 tablet by mouth daily  omeprazole (PRILOSEC) 40 MG DR capsule, Take 1 capsule (40 mg) by mouth daily  polyethylene glycol (MIRALAX/GLYCOLAX) powder, Take 1 capful by mouth daily  rosuvastatin (CRESTOR) 10 MG tablet, Take 2 tablets (20 mg) by mouth daily  senna (SENOKOT) 8.6 MG tablet, Take 2 tablets by mouth 2 times daily as needed for constipation  sirolimus (GENERIC EQUIVALENT) 1 MG tablet, Take 4 tablets (4 mg) by mouth daily  tacrolimus (GENERIC EQUIVALENT) 1 MG capsule, Take 3 capsules (3 mg) by mouth every morning AND 3 capsules (3 mg) every evening. TAKE 2 CAPSULES BY MOUTH IN THE MORNING AND 2 CAPSULES  EVERY EVENING  albuterol (PROAIR HFA/PROVENTIL HFA/VENTOLIN HFA) 108 (90 Base) MCG/ACT inhaler, Inhale 2 puffs into the lungs every 6 hours as needed for shortness of breath / dyspnea or wheezing (Patient not taking: Reported on 9/22/2021)  alendronate (FOSAMAX) 70 MG tablet, Take 1 tablet (70 mg) by mouth every 7 days Take 60 minutes before am meal with 8 oz. water. Remain upright for 30 minutes. (Patient not taking: Reported on 3/16/2022)  fluticasone (FLOVENT HFA) 220 MCG/ACT Inhaler, Inhale 2 puffs into the lungs 2 times daily (Patient not taking: Reported on 9/22/2021)  ondansetron (ZOFRAN-ODT) 4 MG ODT tab, DISSOLVE 1 TABLET ON THE TONGUE EVERY EIGHT HOURS AS NEEDED (Patient not taking: Reported on 7/27/2021)    No current facility-administered medications on file prior to visit.        EXAM:   BP (!) 155/100 (BP Location: Right arm, Patient Position: Chair, Cuff Size: Adult Regular)   Pulse 110   Ht 1.726 m (5' 7.95\")   Wt 84.8 kg (187 lb)   SpO2 98%   BMI 28.47 kg/m    General: appears comfortable, alert and articulate  Neck: no adenopathy, 2+ carotids  Heart: regular, S1/S2, no murmur, gallop, rub, " estimated JVP 9-10cm  Lungs: clear, no rales or wheezing  Abdomen: soft, non-tender, bowel sounds present, no hepatomegaly  Extremities: no clubbing, cyanosis or edema.   Neurological: normal speech and affect, no gross motor deficits    Labs:  CBC RESULTS:  Lab Results   Component Value Date    WBC 5.1 02/23/2022    WBC 5.0 04/09/2021    RBC 4.93 02/23/2022    RBC 4.61 04/09/2021    HGB 11.6 (L) 02/23/2022    HGB 12.1 (L) 02/23/2022    HGB 11.2 (A) 04/09/2021    HCT 37.9 (L) 02/23/2022    HCT 34.6 04/09/2021    MCV 77 (L) 02/23/2022    MCV 75 04/09/2021    MCH 24.5 (L) 02/23/2022    MCH 24.3 04/09/2021    MCHC 31.9 02/23/2022    MCHC 32.4 04/09/2021    RDW 15.4 (H) 02/23/2022    RDW 14.9 04/09/2021     02/23/2022     04/09/2021       CMP RESULTS:  Lab Results   Component Value Date     02/23/2022     03/17/2021    POTASSIUM 3.6 02/23/2022    POTASSIUM 3.9 03/17/2021    CHLORIDE 108 02/23/2022    CHLORIDE 106 03/17/2021    CO2 22 02/23/2022    CO2 24 03/17/2021    ANIONGAP 9 02/23/2022    ANIONGAP 8 03/17/2021    GLC 92 02/23/2022    GLC 85 03/17/2021    BUN 31 (H) 02/23/2022    BUN 32 (H) 03/17/2021    CR 1.94 (H) 02/23/2022    CR 1.80 (H) 03/17/2021    GFRESTIMATED 39 (L) 02/23/2022    GFRESTIMATED 34 (L) 06/22/2021    GFRESTIMATED 41 (L) 03/17/2021    GFRESTBLACK 41 (L) 06/22/2021    GFRESTBLACK 47 (L) 03/17/2021    RADAMES 9.1 02/23/2022    RADAMES 9.0 03/17/2021    BILITOTAL 0.4 02/23/2022    BILITOTAL 0.5 03/17/2021    ALBUMIN 3.2 (L) 02/23/2022    ALBUMIN 3.6 03/17/2021    ALKPHOS 113 02/23/2022    ALKPHOS 131 03/17/2021    ALT 31 02/23/2022    ALT 49 03/17/2021    AST 24 02/23/2022    AST 48 (H) 03/17/2021        INR RESULTS:  Lab Results   Component Value Date    INR 1.17 (H) 09/23/2019       Lab Results   Component Value Date    MAG 1.8 02/23/2022    MAG 1.6 03/17/2021     Lab Results   Component Value Date    NTBNPI 767 09/23/2019     Lab Results   Component Value Date    NTBNP 10,321  (H) 11/15/2018       RHC 2/23/22  RA: 10  RV: 31 (11)  PA: 30/14 (21)  PVR: 0.91  Jaden CO: 6.3    CI: 3.3  TDCO: 7.4       CI: 3.8     Angiogram 2/23/22  Left Main   The vessel is large. There was 0% vessel disease.   Left Anterior Descending   The vessel is large. There was 0% vessel disease.   Left Circumflex   The vessel is moderate in size. There was 0% vessel disease.   Right Coronary Artery   The vessel is large. There was 0% vessel disease.     CMR 2/23/22  Clinical history: 56-year-old man post orthotopic heart transplantation in 2019  Comparison CMR: March 2021  1. The left ventricle is normal in cavity size and wall thickness. There are no regional wall motion  abnormalities. The global systolic function is normal. The LVEF is 55%.  2. The right ventricle is normal in cavity size. The global systolic function is normal. The RVEF is 54%.   3. The left atrium is enlarged due to transplantation and the right atrium is normal in size.  4. There is no significant valvular disease.   5. There is patchy midmyocardial late gadolinium enhancement at the basal-mid RV insertion sites consistent with non ischemic fibrosis.    6. There is no ischemia on stress perfusion imaging.  7. There is no pericardial effusion.  8. There is no intracardiac thrombus.  CONCLUSIONS:   Post heart transplantation.  Normal biventricular size and function, LVEF 55 % and RVEF 54%.   No evidence of myocardial ischemia.  No significant changes when compared to prior study from 2021    Echo 8/31/21  Interpretation Summary  Global and regional left ventricular function is normal with an EF of 60-65%.  Global right ventricular function is normal.  Pulmonary artery systolic pressure is normal.  The inferior vena cava was normal in size with preserved respiratory variability.  No pericardial effusion is present.    RHC and angiography 2/12/2020  Pressures Phase   Time  Systolic  Diastolic  Mean  A Wave  V Wave  EDP  Max dp/dt  HR    RA Pressures   10:37 AM    7 mmHg     9 mmHg     7 mmHg       95 bpm       RV Pressures  10:37 AM  30 mmHg         9 mmHg      95 bpm       PA Pressures  10:38 AM  30 mmHg     16 mmHg     20 mmHg         94 bpm       PCW Pressures  10:38 AM    12 mmHg     13 mmHg     13 mmHg       95 bpm       AO Pressures  11:00 AM  94 mmHg     72 mmHg     83 mmHg         98 bpm       Art Pressures  10:59 AM  120 mmHg     74 mmHg     91 mmHg         103 bpm       LV Pressures  10:59 AM  120 mmHg         17 mmHg      102 bpm          Time  Hb  SAT(%)  PO2  Content  PA Sat    PA  10:21 AM   71.5 %       71.5 %       Art  10:21 AM   100 %      14.28 mL/dL        Cardiac Output    Time  TDCO  TDCI  Jaden C.O.  Jaden C.I.  Jaden HR    Cardiac Output Results  10:21 AM  6.77 L/min     3.74 L/min/m2     5.68 L/min     3.13 L/min/m2            Left Main    The vessel was visualized by selective angiography and is large. There was 0% vessel disease.    Left Anterior Descending    The vessel was visualized by selective angiography and is large. There was 0% vessel disease. IVUS was performed on the LMCA and LAD vessels. The LMCA was engaged with a 6 Fr Ikari LF 3.5 GC and the patient was anticoagulated with IV UFH. A BMW was passed into the distal LAD without difficulty. The [place] [facility] IVUS was passed over the wire and manually pulled back while recording. Proximal LAD HERACLIO 0.3 Lumen 24.1 Vessel 29.8 19% area stenosis Mid LAD HERACLIO 0.3 Lumen 10.8 Vessel 13.7 21% area stenosis IVUS was performed on the vessel. Ultrasound supply: [facility] IVUS SHRT.    Left Circumflex    The vessel was visualized by selective angiography and is moderate in size. There was 0% vessel disease.    Right Coronary Artery    The vessel was visualized by selective angiography and is large. There was 0% vessel disease.    Conclusion    Mild intimal hyperplasia with up to 0.3 mm HERACLIO and 20% narrowing by area.    ECHO 12/18/2019  Interpretation Summary  Left ventricular function,  chamber size, wall motion, and wall thickness are  normal.The EF is 60-65%.  Right ventricular function, chamber size, wall motion, and thickness are  normal.  No significant valvular abnormalities were noted.  Pulmonary artery systolic pressure is normal.  The inferior vena cava was normal in size with preserved respiratory  variability.  No pericardial effusion is present.      RHC and EMB 10/15/19:  Conclusion       Right sided filling pressures are normal.    Left sided filling pressures are normal.    Normal PA pressures.    Normal cardiac output level.    Successful collection of endomyocardial biopsies          Plan       Follow bedrest per protocol    Continued medical management and lifestyle modifications for cardiovascular risk factor optimizations.    Discharge today per protocol   Hemodynamics     Right Heart Pressures     Right sided filling pressures are normal.Left sided filling pressures are normal. Normal PA pressures.Normal cardiac output level.   Heart Biospy     Heart Biopsy     After informed consent patient was prepped and draped in the usual fashion. Under fluoroscopy guidance a 7 Fr angeled sheath was placed into the right internal jugular vein after local anesthesia with 1.0% lidocaine. 5.5 Wolof Jawz bioptome was passed through the sheath to the right ventricular septum and 5 biopsies were obtained. The biopsy specimens were sent to Pathology for examination. The sheath was removed and hemostasis was obtained. The patient tolerated the procedure well and there were no complications.   Pressures Phase: Baseline      Time Systolic Diastolic Mean A Wave V Wave EDP Max dp/dt HR   RA Pressures 11:49 AM   2 mmHg    3 mmHg    4 mmHg      105 bpm      RV Pressures 11:35 AM       1776 mmHg/sec        11:49 AM 22 mmHg        4 mmHg     105 bpm      PA Pressures 11:50 AM 20 mmHg    10 mmHg    14 mmHg        105 bpm      PCW Pressures 11:49 AM   6 mmHg    7 mmHg    7 mmHg      105 bpm      Blood Flow  Results Phase: Baseline      Time Results Indexed Values   QP 11:35 AM 5.92 L/min    3.66 L/min/m2      QS 11:35 AM 5.92 L/min    3.66 L/min/m2      Blood Oximetry Phase: Baseline      Time Hb SAT(%) PO2 Content PA Sat   PA 11:35 AM  66.6 %      66.6 %      Art 11:35 AM  100 %     10.74 mL/dL       Cardiac Output Phase: Baseline      Time TDCO TDCI Jaden C.O. Jaden C.I. Jaden HR   Cardiac Output Results 11:35 AM 6.03 L/min    3.74 L/min/m2    5.92 L/min    3.66 L/min/m2        11:51 AM 6.03 L/min          Resistance Results Phase: Baseline      Time PVR SVR PVR-I SVR-I TPR TVR TPR-I TVR-I PVR/SVR TPR/TVR   Resistance Results (Metric) 11:35 .64 dsc-5     172.25 dsc-5/m2     186.62 dsc-5     301.45 dsc-5/m2         Resistance Results (Wood) 11:35 AM 1.33 MARTIN     2.15 MARTIN/m2     2.33 MARTIN     3.77 MARTIN/m2         Stoke Volume Results Phase: Baseline      Time RVSW LVSW RVSW-I LVSW-I   Stroke Work Results 11:35 AM 9.33 gm*m     5.77 gm*m/m2             TTE 10/15/19:  Interpretation Summary  Left ventricular function, chamber size, wall motion, and wall thickness are  normal.The EF is 60-65%. No regional wall motion abnormalities are seen.  Right ventricular function, chamber size, wall motion, and thickness are  normal.  Trace tricuspid insufficiency is present. Pulmonary artery systolic pressure  is normal. The inferior vena cava was normal in size with preserved  respiratory variability. No pericardial effusion is present.  There has been no change.        Assessment and Plan:  56 year old male with a history of ASD (s/p repair in childhood), AFib/AF (s/p ablation), NICM, diastolic dysfunction, alcohol abuse, Pierce's esophagus, ulcerative colitis, GIB (s/p splenic embolization), and hypothyroidism, now s/p OHT and bypass of persistent left SVC to right atrial appendage 2/24/19 who presents for ongoing evaluation and management.    NICM, s/p OHT and bypass of persistent left SVC to right atrial appendage 2/24/19  His  postoperative course was c/b shock due to RV dysfunction requiring IABP support, small pneumoperitoneum (clinically insignificant), chest wall hematoma (former ICD site, s/p evacuation and drain placement 3/31/19), HCAP/klebsiella pneumonia, and FRANKLIN (required short-term HD, now recovered).   His biopsy 3/25/19 showed mild AMR1 with some staining for c4d.  Repeat biopsy 3/28 showed improved AMR and less reactive capillary staining, and subsequent biopsies have now shown resolution of AMR and improved capillary staining.     Today patient is euvolemic by history and exam.  Overall doing well.  His echo confirmed normal biventricular function.     Serostatus:  - CMV D+/R+  - EBV D+/R+  - Toxo D-/R-     Immunosuppression:  - tacrolimus, given ongoing CRI, goal level decreased to 3-5.  Level today pending.  Adjust dose as indicated.    - Given elevated Cr will stop sirolimus and begin imuran 100mg daily  - will repeat echo, RHC, biopsy one month after immunosuppression change     Graft function:  - BPs:  Elevated today. Begin losartan 25mg daily.  Instructed patient to call clinic if BP consistently > 140/90.  - HRs:  Stable, remain >80  - fluid status:  Euvolemic, off diuretics.        PPx:  - CAV:  Aspirin 81mg daily and rosuvastatin 10mg daily  - GERD:  Omeprazole (note h/o carrera's esophagus)  - Osteoporosis:  Calcium/vitamin D supplements     Routine screenings  Derm: 7/2021  Dental: DUE  Colonoscopy: 3/8/2022  EGD: 3/8/2022, every 3 years.   Prostate:  0.69  Eye: DUE  Flu/Pneumonia: Update.   Covid: 3/3       Ulcerative Colitis  - Followed by GI.        RTC: Echo, RHC and biopsy one month after transitioning to Imuran.  Will be happy to see sooner if change in clinical status or new questions/concerns arise.      Corinna Donis MD  Section Head - Advanced Heart Failure, Transplantation and Mechanical Circulatory Support  Director - [facility]   of  Medicine, [facility]    I spent a total of 45 minutes today in chart review as well as personally reviewing recent cardiac testing and/or laboratory results, today's history and examination, and discussion and counseling with the patient

## 2022-03-16 NOTE — LETTER
3/16/2022      RE: Everton Larios  [address] [facility] [id number]       Dear Colleague,    Thank you for the opportunity to participate in the care of your patient, Everton Larios, at the [facility] HEART [facility]IN[facility] [place] at [facility]. Please see a copy of my visit note below.      HPI:  Everton Larios is a 55 year old male with a history of ASD (s/p repair in childhood), AFib/AF (s/p ablation), NICM, diastolic dysfunction, alcohol abuse, Pierce's esophagus, ulcerative colitis, GIB (s/p splenic embolization), and hypothyroidism, now s/p OHT and bypass of persistent left SVC to right atrial appendage 2/24/19 who presents to clinic for ongoing evaluation and management. His postoperative course was c/b shock due to RV dysfunction requiring IABP support, small pneumoperitoneum (clinically insignificant), chest wall hematoma (former ICD site, s/p evacuation and drain placement 3/31/19), HCAP/klebsiella pneumonia, and FRANKLIN (required short-term HD, now recovered).  His biopsy 3/25/19 showed mild AMR1 with some staining for c4d. Repeat biopsy 3/28 showed improved AMR and less reactive capillary staining, and subsequent biopsies have now shown resolution of AMR and improved capillary staining.  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        Today patient reports that has been feeling well overall.  Notes he hasn't felt this good in many years.  He is back to working.  He denies any chest pain or pressure, sob/andrade, orthopnea, pnd, palpitations, syncope/presyncope or marielle.  He any denies fever/chills, sore throat, swollen lymph nodes, productive cough, abdominal pain, or N/V/D.  He also denies any problems with his medications and reports compliance.      Past Medical History:   Diagnosis Date     Alcohol abuse       Arthritis     hands, neck     ASD (atrial septal defect)     s/p repair age 3     Asthma      Atrial fibrillation (H)      Pierce's esophagus 10/4/2018     Pierce's esophagus      Cataract      CHF (congestive heart failure) (H)      Chronic rhinitis 10/4/2018     Chronic systolic heart failure (H) 10/4/2018     Clotting disorder (H)      Congenital cardiomyopathy in  (H)      Depression 10/4/2018     Depression      Diastolic dysfunction      DJD (degenerative joint disease)     neck     DJD (degenerative joint disease) - neck 10/4/2018     H/O congenital atrial septal defect (ASD) repair at age 3 10/4/2018     History of anesthesia complications     nausea     History of transfusion      Hypothyroidism      Hypothyroidism due to medication 10/4/2018     ICD (implantable cardioverter-defibrillator) in place      ICD, [employer] ; gen change 2018 10/4/2018     Intermittent asthma without complication 10/4/2018     Nonischemic cardiomyopathy (H) 10/4/2018     On amiodarone therapy 10/4/2018     Pacemaker      Paroxysmal atrial fibrillation (H) 10/4/2018     S/P ablation of atrial fibrillation 10/4/2018     Systolic heart failure (H)      Ulcerative colitis (H)      Ulcerative colitis (H)      Ventricular tachycardia (H) 10/4/2018     Ventricular tachycardia (H)        Past Surgical History:   Procedure Laterality Date     ABLATION OF DYSRHYTHMIC FOCUS      for A fib     AICD, DUAL CHAMBER       ASD REPAIR  1968     BRONCHOSCOPY (RIGID OR FLEXIBLE), DIAGNOSTIC N/A 2019    Procedure: BRONCHOSCOPY, WITH BAL;  Surgeon: Margot Chiang MD;  Location:  GI     CARDIAC DEFIBRILLATOR PLACEMENT      x2--last was 2018     COLONOSCOPY N/A 2020    Procedure: COLONOSCOPY, WITH POLYPECTOMY AND BIOPSY;  Surgeon: Ranjeet Cooper MD;  Location:  GI     COLONOSCOPY N/A 2015    Procedure: COLONOSCOPY with biopsy;  Surgeon: Fernie Akers MD;  Location: [facility]  GI;  Service:      COLONOSCOPY N/A 12/19/2017    Procedure: COLONOSCOPY with biopsies and polypectomies using snare;  Surgeon: Gael Romero MD;  Location: [facility] GI;  Service:      COLONOSCOPY N/A 3/8/2022    Procedure: COLONOSCOPY, WITH POLYPECTOMY AND BIOPSY;  Surgeon: Paddy Saldivar DO;  Location: [facility] GI     CV CORONARY ANGIOGRAM N/A 02/12/2020    Procedure: CV CORONARY ANGIOGRAM;  Surgeon: Isiah Mcallister MD;  Location: [facility] HEART CARDIAC CATH LAB     CV CORONARY ANGIOGRAM N/A 03/17/2021    Procedure: CV CORONARY ANGIOGRAM;  Surgeon: Santino Mckeon MD;  Location: [facility] HEART CARDIAC [facility]ATH LAB     CV CORONARY ANGIOGRAM N/A 2/23/2022    Procedure: CV CORONARY ANGIOGRAM;  Surgeon: Yves Rodrigues MD;  [facility] HEART [facility]ARDIAC [facility] LAB     CV HEART BIOPSY N/A 03/04/2019    Procedure: Heart Biopsy;  Surgeon: Abhijeet Titus MD;  [facility]: [facility]AC [facility]     CV HEART BIOPSY N/A 03/25/2019    Procedure: Heart Biopsy;  Surgeon: Yves Rodrigues MD;  [facility]     CV HEART BIOPSY N/A 03/28/2019    Procedure: Heart Cath Heart Biopsy;  Surgeon: Yves Rodrigues MD;  [facility] LAB     CV HEART BIOPSY N/A 04/10/2019    Procedure: CV HEART BIOPSY;  Surgeon: Isiah Mcallister MD;  [facility]     CV HEART BIOPSY N/A 04/24/2019    Procedure: CV HEART BIOPSY;  Surgeon: Isiah Mcallister MD;  [facility]     CV HEART BIOPSY N/A 05/08/2019    Procedure: CV HEART BIOPSY;  Surgeon: Isiah Mcallister MD;  [facility]     CV HEART BIOPSY N/A 06/04/2019    Procedure: CV HEART BIOPSY;  Surgeon: Alton Solomon MD;  [facility]     CV HEART BIOPSY N/A 05/22/2019    Procedure: CV HEART BIOPSY;  Surgeon: Yves Rodrigues MD;  [facility]ARD[facility]AC [facility] LAB     CV HEART BIOPSY N/A 07/17/2019    Procedure: CV HEART BIOPSY;  Surgeon: Esvin  Isiah Mensah MD;  Location:  HEART CARDIAC CATH LAB     CV HEART BIOPSY N/A 08/13/2019    Procedure: CV HEART BIOPSY;  Surgeon: Jackson Patten MD;  Location:  HEART CARDIAC CATH LAB     CV HEART BIOPSY N/A 10/15/2019    Procedure: CV HEART BIOPSY;  Surgeon: Santino Mckeon MD;  Location:  HEART CARDIAC CATH LAB     CV HEART BIOPSY N/A 02/12/2020    Procedure: CV HEART BIOPSY;  Surgeon: Isiah Mcallister MD;  Location:  HEART CARDIAC CATH LAB     CV HEART BIOPSY N/A 05/05/2020    Procedure: CV HEART BIOPSY;  Surgeon: Yves Rodrigues MD;  Location:  HEART CARDIAC CATH LAB     CV HEART BIOPSY N/A 03/17/2021    Procedure: CV HEART BIOPSY;  Surgeon: Santino Mckeon MD;  Location:  HEART CARDIAC CATH LAB     CV INTRA AORTIC BALLOON N/A 02/18/2019    Procedure: Intra-Aortic Balloon;  Surgeon: Alton Solomon MD;  Location:  HEART CARDIAC CATH LAB     CV INTRA AORTIC BALLOON N/A 02/20/2019    Procedure: Replace subclavian IABP;  Surgeon: Yves Rodrigues MD;  Location:  HEART CARDIAC CATH LAB     CV INTRAVASULAR ULTRASOUND N/A 02/12/2020    Procedure: CV INTRAVASCULAR ULTRASOUND;  Surgeon: Isiah Mcallister MD;  Location:  HEART CARDIAC CATH LAB     CV LEFT HEART CATH N/A 03/19/2019    Procedure: Left Heart Cath;  Surgeon: Yves Rodrigues MD;  Location:  HEART CARDIAC CATH LAB     CV LEFT HEART CATH N/A 02/12/2020    Procedure: Left Heart Cath;  Surgeon: Isiah Mcallister MD;  Location:  HEART CARDIAC CATH LAB     CV MYOCARDIAL BIOPSY N/A 03/19/2019    Procedure: RHC/HBx - Femoral access for 3/19 per Nimisha GONZALEZ;  Surgeon: Yves Rodrigues MD;  Location:  HEART CARDIAC CATH LAB     CV MYOCARDIAL BIOPSY N/A 03/11/2019    Procedure: ADD ON RHC/HBX;  Surgeon: Alton Solomon MD;  Location:  HEART CARDIAC CATH LAB     CV RIGHT HEART CATH MEASUREMENTS RECORDED N/A 03/25/2019    Procedure: Right Heart Cath;  Surgeon: Yves Rodrigues MD;  Location:   HEART CARDIAC CATH LAB     CV RIGHT HEART CATH MEASUREMENTS RECORDED N/A 03/28/2019    Procedure: Heart Cath Right Heart Cath;  Surgeon: Yves Rodrigues MD;  Location:  HEART CARDIAC CATH LAB     CV RIGHT HEART CATH MEASUREMENTS RECORDED N/A 04/24/2019    Procedure: CV RIGHT HEART CATH;  Surgeon: Isiah Mcallister MD;  Location:  HEART CARDIAC CATH LAB     CV RIGHT HEART CATH MEASUREMENTS RECORDED N/A 06/04/2019    Procedure: CV RIGHT HEART CATH;  Surgeon: Alton Solomon MD;  Location:  HEART CARDIAC CATH LAB     CV RIGHT HEART CATH MEASUREMENTS RECORDED N/A 05/22/2019    Procedure: CV RIGHT HEART CATH;  Surgeon: Yves Rodrigues MD;  Location:  HEART CARDIAC CATH LAB     CV RIGHT HEART CATH MEASUREMENTS RECORDED N/A 08/13/2019    Procedure: CV RIGHT HEART CATH;  Surgeon: Jackson Patten MD;  Location:  HEART CARDIAC CATH LAB     CV RIGHT HEART CATH MEASUREMENTS RECORDED N/A 10/15/2019    Procedure: CV RIGHT HEART CATH;  Surgeon: Santino Mckeon MD;  Location:  HEART CARDIAC CATH LAB     CV RIGHT HEART CATH MEASUREMENTS RECORDED N/A 02/12/2020    Procedure: CV RIGHT HEART CATH;  Surgeon: Isiah Mcallister MD;  Location:  HEART CARDIAC CATH LAB     CV RIGHT HEART CATH MEASUREMENTS RECORDED N/A 03/17/2021    Procedure: CV RIGHT HEART CATH;  Surgeon: Santino Mckeon MD;  Location:  HEART CARDIAC CATH LAB     CV RIGHT HEART CATH MEASUREMENTS RECORDED N/A 2/23/2022    Procedure: CV RIGHT HEART CATH;  Surgeon: Yves Rodrigues MD;  Location:  HEART CARDIAC CATH LAB     ESOPHAGOSCOPY, GASTROSCOPY, DUODENOSCOPY (EGD), COMBINED N/A 12/19/2017    Procedure: ESOPHAGOGASTRODUODENOSCOPY (EGD) with biopsies;  Surgeon: Gael Romero MD;  Location: [facility];  Service:      ESOPHAGOSCOPY, GASTROSCOPY, DUODENOSCOPY (EGD), COMBINED N/A 3/8/2022    Procedure: ESOPHAGOGASTRODUODENOSCOPY, WITH BIOPSY;  Surgeon: Paddy Saldivar DO;  Location: [facility]  LINE INSERTION >5YR, FAILED Bilateral 10/28/2021    L sided SVC     HAND SURGERY Left      HC REVISE MEDIAN N/CARPAL TUNNEL SURG  2016    Procedure: OPEN RIGHT CARPAL TUNNEL RELEASE CORTISONE INJECTION RIGHT THUMB;  Surgeon: Duong Santoyo MD;  Location: [facility] Main OR;  Service: Orthopedics     INCISION AND DRAINAGE CHEST WASHOUT, COMBINED N/A 2019    Procedure: Incision And Drainage; Evacuation Right Chest Wall Hematoma;  Surgeon: Dewayne House MD;  Location: [facility] OR     INSERT INTRAAORTIC BALLOON PUMP Left 2019    Procedure: Insert left  Subclavian Balloon Pump,  Removal Right femoral arterial balloom pump sheath;  Surgeon: Dewayne House MD;  Location: [facility] OR     INSERT INTRAAORTIC BALLOON PUMP Left 2019    Procedure: SUBCLAVIAN BALLOON PUMP PLACEMENT;  Surgeon: Ben White MD;  Location: [facility] OR     INSERT INTRACORONARY STENT      x2     IR PICC PLACEMENT > 5 YRS OF AGE  2019     TRANSPLANT HEART RECIPIENT N/A 2019    Procedure: TRANSPLANT HEART RECIPIENT;  Surgeon: Dewayne House MD;  Location: [facility] OR       Family History   Problem Relation Age of Onset     Endometrial Cancer Mother      Osteoporosis Mother      Hypertension Father      Endometrial Cancer Maternal Grandmother      Hip fracture No family hx of      Nephrolithiasis No family hx of        Social History     Tobacco Use     Smoking status: Former Smoker     Packs/day: 1.00     Years: 25.00     Pack years: 25.00     Start date: 1980     Quit date: 2008     Years since quittin.3     Smokeless tobacco: Never Used   Substance Use Topics     Alcohol use: Yes     Alcohol/week: 1.0 - 2.0 standard drink     Types: 1 - 2 Cans of beer per week       MEDICATIONS:  acetaminophen (TYLENOL) 325 MG tablet, Take 2 tablets (650 mg) by mouth every 4 hours as needed for mild pain  amLODIPine (NORVASC) 5 MG tablet, TAKE 2 TABLETS BY MOUTH EVERY DAY  aspirin (ASA) 81 MG chewable tablet, Take  1 tablet (81 mg) by mouth daily  calcium carbonate 600 mg-vitamin D 400 units (CALTRATE) 600-400 MG-UNIT per tablet, Take 1 tablet by mouth 2 times daily (with meals)  cetirizine (ZYRTEC) 10 MG tablet, Take 10 mg by mouth daily  DULoxetine (CYMBALTA) 20 MG capsule, Take 1 capsule (20 mg) by mouth daily  levothyroxine (SYNTHROID/LEVOTHROID) 75 MCG tablet, Take 1 tablet (75 mcg) by mouth daily  melatonin 3 MG tablet, Take 2 tablets (6 mg) by mouth At Bedtime (Patient taking differently: Take 5 mg by mouth nightly as needed )  multivitamin w/minerals (THERA-VIT-M) tablet, Take 1 tablet by mouth daily  omeprazole (PRILOSEC) 40 MG DR capsule, Take 1 capsule (40 mg) by mouth daily  polyethylene glycol (MIRALAX/GLYCOLAX) powder, Take 1 capful by mouth daily  rosuvastatin (CRESTOR) 10 MG tablet, Take 2 tablets (20 mg) by mouth daily  senna (SENOKOT) 8.6 MG tablet, Take 2 tablets by mouth 2 times daily as needed for constipation  sirolimus (GENERIC EQUIVALENT) 1 MG tablet, Take 4 tablets (4 mg) by mouth daily  tacrolimus (GENERIC EQUIVALENT) 1 MG capsule, Take 3 capsules (3 mg) by mouth every morning AND 3 capsules (3 mg) every evening. TAKE 2 CAPSULES BY MOUTH IN THE MORNING AND 2 CAPSULES  EVERY EVENING  albuterol (PROAIR HFA/PROVENTIL HFA/VENTOLIN HFA) 108 (90 Base) MCG/ACT inhaler, Inhale 2 puffs into the lungs every 6 hours as needed for shortness of breath / dyspnea or wheezing (Patient not taking: Reported on 9/22/2021)  alendronate (FOSAMAX) 70 MG tablet, Take 1 tablet (70 mg) by mouth every 7 days Take 60 minutes before am meal with 8 oz. water. Remain upright for 30 minutes. (Patient not taking: Reported on 3/16/2022)  fluticasone (FLOVENT HFA) 220 MCG/ACT Inhaler, Inhale 2 puffs into the lungs 2 times daily (Patient not taking: Reported on 9/22/2021)  ondansetron (ZOFRAN-ODT) 4 MG ODT tab, DISSOLVE 1 TABLET ON THE TONGUE EVERY EIGHT HOURS AS NEEDED (Patient not taking: Reported on 7/27/2021)    No current  "facility-administered medications on file prior to visit.        EXAM:   BP (!) 155/100 (BP Location: Right arm, Patient Position: Chair, Cuff Size: Adult Regular)   Pulse 110   Ht 1.726 m (5' 7.95\")   Wt 84.8 kg (187 lb)   SpO2 98%   BMI 28.47 kg/m    GENERAL: Alert, oriented, NAD  HEENT:  NC/AT. Sclerae white.  MMM, no oral lesions  NECK: JVP 7-8 cm, 2+ carotids without bruits  HEART: RRR, normal S1 and S2, no murmurs. 2+ bilateral peripheral pulses.  LUNGS:  Clear to auscultation bilaterally, no wheezes, rales, or rhonchi  ABDOMEN: Soft, nontender, nondistended, bowel sounds present, no ascites.  EXTREMITIES: No lower extremity edema.    Labs:    CBC RESULTS:  Lab Results   Component Value Date    WBC 5.1 02/23/2022    WBC 5.0 04/09/2021    RBC 4.93 02/23/2022    RBC 4.61 04/09/2021    HGB 11.6 (L) 02/23/2022    HGB 12.1 (L) 02/23/2022    HGB 11.2 (A) 04/09/2021    HCT 37.9 (L) 02/23/2022    HCT 34.6 04/09/2021    MCV 77 (L) 02/23/2022    MCV 75 04/09/2021    MCH 24.5 (L) 02/23/2022    MCH 24.3 04/09/2021    MCHC 31.9 02/23/2022    MCHC 32.4 04/09/2021    RDW 15.4 (H) 02/23/2022    RDW 14.9 04/09/2021     02/23/2022     04/09/2021       CMP RESULTS:  Lab Results   Component Value Date     02/23/2022     03/17/2021    POTASSIUM 3.6 02/23/2022    POTASSIUM 3.9 03/17/2021    CHLORIDE 108 02/23/2022    CHLORIDE 106 03/17/2021    CO2 22 02/23/2022    CO2 24 03/17/2021    ANIONGAP 9 02/23/2022    ANIONGAP 8 03/17/2021    GLC 92 02/23/2022    GLC 85 03/17/2021    BUN 31 (H) 02/23/2022    BUN 32 (H) 03/17/2021    CR 1.94 (H) 02/23/2022    CR 1.80 (H) 03/17/2021    GFRESTIMATED 39 (L) 02/23/2022    GFRESTIMATED 34 (L) 06/22/2021    GFRESTIMATED 41 (L) 03/17/2021    GFRESTBLACK 41 (L) 06/22/2021    GFRESTBLACK 47 (L) 03/17/2021    RADAMES 9.1 02/23/2022    RADAMES 9.0 03/17/2021    BILITOTAL 0.4 02/23/2022    BILITOTAL 0.5 03/17/2021    ALBUMIN 3.2 (L) 02/23/2022    ALBUMIN 3.6 03/17/2021    ALKPHOS " 113 02/23/2022    ALKPHOS 131 03/17/2021    ALT 31 02/23/2022    ALT 49 03/17/2021    AST 24 02/23/2022    AST 48 (H) 03/17/2021        INR RESULTS:  Lab Results   Component Value Date    INR 1.17 (H) 09/23/2019       Lab Results   Component Value Date    MAG 1.8 02/23/2022    MAG 1.6 03/17/2021     Lab Results   Component Value Date    NTBNPI 767 09/23/2019     Lab Results   Component Value Date    NTBNP 10,986 (H) 11/15/2018       Echo today  Interpretation Summary  Global and regional left ventricular function is normal with an EF of 60-65%.  Global right ventricular function is normal.  Pulmonary artery systolic pressure is normal.  The inferior vena cava was normal in size with preserved respiratory variability.  No pericardial effusion is present.    RHC and angiography 2/12/2020  Pressures Phase   Time  Systolic  Diastolic  Mean  A Wave  V Wave  EDP  Max dp/dt  HR    RA Pressures  10:37 AM    7 mmHg     9 mmHg     7 mmHg       95 bpm       RV Pressures  10:37 AM  30 mmHg         9 mmHg      95 bpm       PA Pressures  10:38 AM  30 mmHg     16 mmHg     20 mmHg         94 bpm       PCW Pressures  10:38 AM    12 mmHg     13 mmHg     13 mmHg       95 bpm       AO Pressures  11:00 AM  94 mmHg     72 mmHg     83 mmHg         98 bpm       Art Pressures  10:59 AM  120 mmHg     74 mmHg     91 mmHg         103 bpm       LV Pressures  10:59 AM  120 mmHg         17 mmHg      102 bpm          Time  Hb  SAT(%)  PO2  Content  PA Sat    PA  10:21 AM   71.5 %       71.5 %       Art  10:21 AM   100 %      14.28 mL/dL        Cardiac Output    Time  TDCO  TDCI  Jaden C.O.  Jaden C.I.  Jaden HR    Cardiac Output Results  10:21 AM  6.77 L/min     3.74 L/min/m2     5.68 L/min     3.13 L/min/m2            Left Main    The vessel was visualized by selective angiography and is large. There was 0% vessel disease.    Left Anterior Descending    The vessel was visualized by selective angiography and is large. There was 0% vessel disease.  IVUS was performed on the LMCA and LAD vessels. The LMCA was engaged with a 6 Fr Ikari LF 3.5 GC and the patient was anticoagulated with IV UFH. A BMW was passed into the distal LAD without difficulty. The [facility] IVUS was passed over the wire and manually pulled back while recording. Proximal LAD HERACLIO 0.3 Lumen 24.1 Vessel 29.8 19% area stenosis Mid LAD HERACLIO 0.3 Lumen 10.8 Vessel 13.7 21% area stenosis IVUS was performed on the vessel. Ultrasound supply: [facility] IVUS SHRT.    Left Circumflex    The vessel was visualized by selective angiography and is moderate in size. There was 0% vessel disease.    Right Coronary Artery    The vessel was visualized by selective angiography and is large. There was 0% vessel disease.    Conclusion    Mild intimal hyperplasia with up to 0.3 mm HERACLIO and 20% narrowing by area.    ECHO 12/18/2019  Interpretation Summary  Left ventricular function, chamber size, wall motion, and wall thickness are  normal.The EF is 60-65%.  Right ventricular function, chamber size, wall motion, and thickness are  normal.  No significant valvular abnormalities were noted.  Pulmonary artery systolic pressure is normal.  The inferior vena cava was normal in size with preserved respiratory  variability.  No pericardial effusion is present.      RHC and EMB 10/15/19:  Conclusion       Right sided filling pressures are normal.    Left sided filling pressures are normal.    Normal PA pressures.    Normal cardiac output level.    Successful collection of endomyocardial biopsies          Plan       Follow bedrest per protocol    Continued medical management and lifestyle modifications for cardiovascular risk factor optimizations.    Discharge today per protocol   Hemodynamics     Right Heart Pressures     Right sided filling pressures are normal.Left sided filling pressures are normal. Normal PA pressures.Normal cardiac output level.   Heart Biospy     Heart Biopsy     After informed consent patient  was prepped and draped in the usual fashion. Under fluoroscopy guidance a 7 Fr angeled sheath was placed into the right internal jugular vein after local anesthesia with 1.0% lidocaine. 5.5 [place] Jawz bioptome was passed through the sheath to the right ventricular septum and 5 biopsies were obtained. The biopsy specimens were sent to Pathology for examination. The sheath was removed and hemostasis was obtained. The patient tolerated the procedure well and there were no complications.   Pressures Phase: Baseline      Time Systolic Diastolic Mean A Wave V Wave EDP Max dp/dt HR   RA Pressures 11:49 AM   2 mmHg    3 mmHg    4 mmHg      105 bpm      RV Pressures 11:35 AM       1776 mmHg/sec        11:49 AM 22 mmHg        4 mmHg     105 bpm      PA Pressures 11:50 AM 20 mmHg    10 mmHg    14 mmHg        105 bpm      PCW Pressures 11:49 AM   6 mmHg    7 mmHg    7 mmHg      105 bpm      Blood Flow Results Phase: Baseline      Time Results Indexed Values   QP 11:35 AM 5.92 L/min    3.66 L/min/m2      QS 11:35 AM 5.92 L/min    3.66 L/min/m2      Blood Oximetry Phase: Baseline      Time Hb SAT(%) PO2 Content PA Sat   PA 11:35 AM  66.6 %      66.6 %      Art 11:35 AM  100 %     10.74 mL/dL       Cardiac Output Phase: Baseline      Time TDCO TDCI Jaden C.O. Jaden C.I. Jaden HR   Cardiac Output Results 11:35 AM 6.03 L/min    3.74 L/min/m2    5.92 L/min    3.66 L/min/m2        11:51 AM 6.03 L/min          Resistance Results Phase: Baseline      Time PVR SVR PVR-I SVR-I TPR TVR TPR-I TVR-I PVR/SVR TPR/TVR   Resistance Results (Metric) 11:35 .64 dsc-5     172.25 dsc-5/m2     186.62 dsc-5     301.45 dsc-5/m2         Resistance Results (Wood) 11:35 AM 1.33 MARTIN     2.15 MARTIN/m2     2.33 MARTIN     3.77 MARTIN/m2         Stoke Volume Results Phase: Baseline      Time RVSW LVSW RVSW-I LVSW-I   Stroke Work Results 11:35 AM 9.33 gm*m     5.77 gm*m/m2             TTE 10/15/19:  Interpretation Summary  Left ventricular function, chamber size, wall  motion, and wall thickness are  normal.The EF is 60-65%. No regional wall motion abnormalities are seen.  Right ventricular function, chamber size, wall motion, and thickness are  normal.  Trace tricuspid insufficiency is present. Pulmonary artery systolic pressure  is normal. The inferior vena cava was normal in size with preserved  respiratory variability. No pericardial effusion is present.  There has been no change.        Assessment and Plan:  55 year old male with a history of ASD (s/p repair in childhood), AFib/AF (s/p ablation), NICM, diastolic dysfunction, alcohol abuse, Pierce's esophagus, ulcerative colitis, GIB (s/p splenic embolization), and hypothyroidism, now s/p OHT and bypass of persistent left SVC to right atrial appendage 2/24/19 who presents for ongoing evaluation and management.    NICM, s/p OHT and bypass of persistent left SVC to right atrial appendage 2/24/19  His postoperative course was c/b shock due to RV dysfunction requiring IABP support, small pneumoperitoneum (clinically insignificant), chest wall hematoma (former ICD site, s/p evacuation and drain placement 3/31/19), HCAP/klebsiella pneumonia, and FRANKLIN (required short-term HD, now recovered).   His biopsy 3/25/19 showed mild AMR1 with some staining for c4d.  Repeat biopsy 3/28 showed improved AMR and less reactive capillary staining, and subsequent biopsies have now shown resolution of AMR and improved capillary staining.     Today patient is euvolemic by history and exam.  Overall doing well.  His echo confirmed normal biventricular function.     Serostatus:  - CMV D+/R+  - EBV D+/R+  - Toxo D-/R-     Immunosuppression:  - tacrolimus, given ongoing CRI, goal level decreased to 3-5.  Level today pending.  Adjust dose as indicated.    - sirolimus goal level 6-8.  Level today pending.  Adjust dose as indicated.         Graft function:  - BPs:  Elevated today.  Increase amlodipine to 10mg daily.  Instructed patient to call clinic if BP  consistently > 140/90.  - HRs:  Stable, remain >80  - fluid status:  Euvolemic, off diuretics.        PPx:  - CAV:  Aspirin 81mg daily and rosuvastatin 10mg daily  - GERD:  Omeprazole (note h/o carrera's esophagus)  - Osteoporosis:  Calcium/vitamin D supplements     Routine screenings  Derm: Due  Dental: Due   Colonoscopy: Feb 2020   Prostate: 0.41  Eye: Due   Flu/Pneumonia: Flu shot given at clinic. Shringrix discussed. Pneumonia shots completed.      Ulcerative Colitis  - Followed by GI.        RTC: per protocol for annual evaluation in Feb 2021.  Will be happy to see sooner if change in clinical status or new questions/concerns arise.        Please do not hesitate to contact me if you have any questions/concerns.     Sincerely,     Corinna Donis MD

## 2022-03-16 NOTE — PATIENT INSTRUCTIONS
Patient Instructions  1. Please start losartan 25 mg once a day.   2. Please monitor your BP and let me know if consistently >140/90.   3. Please continue to watch diet, avoid greasy, fatty foods. Avoid alcohol. We will recheck your lipids with your next appointments.   4. Once you receive imuran, contact me and we will go over transitioning to tacro.   5. A prescription sent for imuran 100 mg once a day.   6. You will need a rhc/hbx/echo in 1 month. At that time we will get immuknow lipid, and dsas.     Next transplant clinic appointment:  1 month after transitioning to Imuran  Next lab draw: TBD.  Coordinator will call with all pending results.     Please call transplant coordinator with any questions:    Jocelynn Jerez RN BSN   Post Heart Transplant Nurse Coordinator  [facility]  Questions: [phone]

## 2022-03-25 ENCOUNTER — TELEPHONE (OUTPATIENT)
Dept: TRANSPLANT | Facility: CLINIC | Age: 57
End: 2022-03-25
Payer: COMMERCIAL

## 2022-03-25 DIAGNOSIS — Z94.1 TRANSPLANTED HEART (H): ICD-10-CM

## 2022-03-25 RX ORDER — LOSARTAN POTASSIUM 25 MG/1
50 TABLET ORAL DAILY
Qty: 90 TABLET | Refills: 3 | Status: SHIPPED | OUTPATIENT
Start: 2022-03-25 | End: 2022-05-16

## 2022-03-25 RX ORDER — AZATHIOPRINE 50 MG/1
100 TABLET ORAL DAILY
Qty: 180 TABLET | Refills: 3 | Status: SHIPPED | OUTPATIENT
Start: 2022-03-25 | End: 2022-12-09

## 2022-03-25 NOTE — TELEPHONE ENCOUNTER
Patient Called has not heard back about IS medications       Call back needed? Yes    Return Call Needed  Same as documented in contacts section  When to return call?: Same day: Route High Priority

## 2022-03-25 NOTE — TELEPHONE ENCOUNTER
Pt still hasn't received imuran. Writer sent to speciality pharmacy and patient states he would rather it sent to [facility]. Prescription resent. Pt also hasn't started losaran. Pt now wants that prescription sent to local [facility]. Pt encouraged to call when he receives imuran for instructions on transition and also instructed to repeat labs 1 week after starting losartan.

## 2022-04-01 ENCOUNTER — TELEPHONE (OUTPATIENT)
Dept: TRANSPLANT | Facility: CLINIC | Age: 57
End: 2022-04-01
Payer: COMMERCIAL

## 2022-04-01 ENCOUNTER — CARE COORDINATION (OUTPATIENT)
Dept: TRANSPLANT | Facility: CLINIC | Age: 57
End: 2022-04-01
Payer: COMMERCIAL

## 2022-04-01 NOTE — TELEPHONE ENCOUNTER
Pt has a new med that he is to start and was told to call Jocelynn before starting  Needed to transitioned  To the med

## 2022-04-01 NOTE — TELEPHONE ENCOUNTER
Reviewed med conversion with Transplant PharmacistIvon.     Pt called to discuss transition from Tac to Imuran. Requested pt to pull over while driving to write down plan    Take last dose of tac this evening. Remove tac from pill box and replace with once daily Imuran. No overlap. Stop and start. OK to move daily sirolimus to AM and take with Imuran. Be sure to eat something before taking AM meds. Labs/ ECHO/ RHC/biopsy and clinic one moth after med change.     Pt reports he started the Losartan last week. He has not checked his BP since starting it. He will check BP this weekend. Requested to update coordinator on readings. Pt does not recall that he was suppose to have labs rechecked after starting med - will have drawn Sat 4/9.     Enc to call with questions.

## 2022-04-01 NOTE — LETTER
Everton Larios  [address] [address]         2022    To: [facility]  Fax: [phone]        Patient Name: Everton Larios   :  1965   MRN: [id number]  ICD10:  z94.1 , z79.899    Subject: Request for Labs    Everton Larios is a heart transplant patient currently being followed by the [facility].  We would like to request your assistance in obtaining the following laboratory tests which are part of our routine surveillance program for heart transplant recipients.  (Items needed are checked.)    Please fax the lab results as soon as they are available to:   Thoracic Transplant Department  Fax Number:  [phone]     Item Frequency   x CBC with platelets Once around 22 and Standing orders with all drug level changes. Qty: 20. Expires 2023   x Basic metabolic panel (including:  BUN,   Serum Creatinine, Sodium, Potassium, Chloride,   CO2, Calcium, Magnesium, Phosphorus, and   Glucose) Once 22 and Standing orders with all drug level changes. Qty: 20. Expires 2023   x Rapamune (24 hour trough)   (Should be mailed to the [facility] in the  provided to you by the patient.) Standing orders with all drug level changes. Qty: 20. Expires 2023     Thank you  for your continued support and the opportunity to collaborate in the care of this patient.  If you have any questions, please call the Thoracic Transplant Team at [phone] or [phone].    .

## 2022-04-04 DIAGNOSIS — Z94.1 TRANSPLANTED HEART (H): Primary | ICD-10-CM

## 2022-04-15 ENCOUNTER — LAB (OUTPATIENT)
Dept: LAB | Facility: CLINIC | Age: 57
End: 2022-04-15
Payer: COMMERCIAL

## 2022-04-15 ENCOUNTER — TELEPHONE (OUTPATIENT)
Dept: TRANSPLANT | Facility: CLINIC | Age: 57
End: 2022-04-15
Payer: COMMERCIAL

## 2022-04-15 DIAGNOSIS — Z11.59 ENCOUNTER FOR SCREENING FOR OTHER VIRAL DISEASES: Primary | ICD-10-CM

## 2022-04-15 DIAGNOSIS — Z94.1 TRANSPLANTED HEART (H): ICD-10-CM

## 2022-04-15 DIAGNOSIS — Z94.1 TRANSPLANTED HEART (H): Primary | ICD-10-CM

## 2022-04-15 LAB
ALBUMIN UR-MCNC: >=300 MG/DL
ANION GAP SERPL CALCULATED.3IONS-SCNC: 11 MMOL/L (ref 5–18)
APPEARANCE UR: CLEAR
BACTERIA #/AREA URNS HPF: NORMAL /HPF
BILIRUB UR QL STRIP: NEGATIVE
BUN SERPL-MCNC: 23 MG/DL (ref 8–22)
CALCIUM SERPL-MCNC: 9.3 MG/DL (ref 8.5–10.5)
CHLORIDE BLD-SCNC: 106 MMOL/L (ref 98–107)
CO2 SERPL-SCNC: 22 MMOL/L (ref 22–31)
COLOR UR AUTO: YELLOW
CREAT SERPL-MCNC: 1.97 MG/DL (ref 0.7–1.3)
GFR SERPL CREATININE-BSD FRML MDRD: 38 ML/MIN/1.73M2
GLUCOSE BLD-MCNC: 117 MG/DL (ref 70–125)
GLUCOSE UR STRIP-MCNC: NEGATIVE MG/DL
HGB UR QL STRIP: ABNORMAL
KETONES UR STRIP-MCNC: NEGATIVE MG/DL
LEUKOCYTE ESTERASE UR QL STRIP: NEGATIVE
NITRATE UR QL: NEGATIVE
PH UR STRIP: 7 [PH] (ref 5–8)
POTASSIUM BLD-SCNC: 4.1 MMOL/L (ref 3.5–5)
RBC #/AREA URNS AUTO: NORMAL /HPF
SODIUM SERPL-SCNC: 139 MMOL/L (ref 136–145)
SP GR UR STRIP: 1.02 (ref 1–1.03)
SQUAMOUS #/AREA URNS AUTO: NORMAL /LPF
UROBILINOGEN UR STRIP-ACNC: 0.2 E.U./DL
WBC #/AREA URNS AUTO: NORMAL /HPF

## 2022-04-15 PROCEDURE — 81001 URINALYSIS AUTO W/SCOPE: CPT

## 2022-04-15 PROCEDURE — 36415 COLL VENOUS BLD VENIPUNCTURE: CPT

## 2022-04-15 PROCEDURE — 80048 BASIC METABOLIC PNL TOTAL CA: CPT

## 2022-04-15 NOTE — TELEPHONE ENCOUNTER
Everton called to be reminded he never got labs after starting losartan. Pt able to go today. Orders are in epic. Pt states he hasn't checked his BP since last week, but it was 150/70's at that time. Pt encouraged to check consistently and we will review at his upcoming appointments.     Rhc/hbx, echo, lab, and clinic scheduled for 5/10 after switching to imuran.

## 2022-04-29 ENCOUNTER — LAB (OUTPATIENT)
Dept: FAMILY MEDICINE | Facility: CLINIC | Age: 57
End: 2022-04-29
Payer: COMMERCIAL

## 2022-04-29 DIAGNOSIS — Z11.59 ENCOUNTER FOR SCREENING FOR OTHER VIRAL DISEASES: ICD-10-CM

## 2022-04-29 LAB — SARS-COV-2 RNA RESP QL NAA+PROBE: NEGATIVE

## 2022-04-29 PROCEDURE — U0003 INFECTIOUS AGENT DETECTION BY NUCLEIC ACID (DNA OR RNA); SEVERE ACUTE RESPIRATORY SYNDROME CORONAVIRUS 2 (SARS-COV-2) (CORONAVIRUS DISEASE [COVID-19]), AMPLIFIED PROBE TECHNIQUE, MAKING USE OF HIGH THROUGHPUT TECHNOLOGIES AS DESCRIBED BY CMS-2020-01-R: HCPCS

## 2022-04-29 PROCEDURE — U0005 INFEC AGEN DETEC AMPLI PROBE: HCPCS

## 2022-05-05 DIAGNOSIS — Z94.1 TRANSPLANTED HEART (H): Primary | ICD-10-CM

## 2022-05-05 RX ORDER — LIDOCAINE 40 MG/G
CREAM TOPICAL
Status: CANCELLED | OUTPATIENT
Start: 2022-05-05

## 2022-05-07 ENCOUNTER — LAB (OUTPATIENT)
Dept: FAMILY MEDICINE | Facility: CLINIC | Age: 57
End: 2022-05-07
Attending: INTERNAL MEDICINE
Payer: COMMERCIAL

## 2022-05-07 DIAGNOSIS — Z11.59 ENCOUNTER FOR SCREENING FOR OTHER VIRAL DISEASES: ICD-10-CM

## 2022-05-07 PROCEDURE — U0003 INFECTIOUS AGENT DETECTION BY NUCLEIC ACID (DNA OR RNA); SEVERE ACUTE RESPIRATORY SYNDROME CORONAVIRUS 2 (SARS-COV-2) (CORONAVIRUS DISEASE [COVID-19]), AMPLIFIED PROBE TECHNIQUE, MAKING USE OF HIGH THROUGHPUT TECHNOLOGIES AS DESCRIBED BY CMS-2020-01-R: HCPCS

## 2022-05-07 PROCEDURE — U0005 INFEC AGEN DETEC AMPLI PROBE: HCPCS

## 2022-05-08 LAB — SARS-COV-2 RNA RESP QL NAA+PROBE: NEGATIVE

## 2022-05-09 ENCOUNTER — TELEPHONE (OUTPATIENT)
Dept: CARDIOLOGY | Facility: CLINIC | Age: 57
End: 2022-05-09
Payer: COMMERCIAL

## 2022-05-09 DIAGNOSIS — Z13.220 LIPID SCREENING: Primary | ICD-10-CM

## 2022-05-09 NOTE — TELEPHONE ENCOUNTER
Left voicemail for patient to complete Travel Screen for Cardiac Cath Lab appointment on 5/10 and inform patient of updated Visitor Policy.       covid neg

## 2022-05-10 ENCOUNTER — HOSPITAL ENCOUNTER (OUTPATIENT)
Facility: CLINIC | Age: 57
Discharge: HOME | End: 2022-05-10
Attending: INTERNAL MEDICINE | Admitting: INTERNAL MEDICINE
Payer: COMMERCIAL

## 2022-05-10 ENCOUNTER — APPOINTMENT (OUTPATIENT)
Dept: LAB | Facility: CLINIC | Age: 57
End: 2022-05-10
Attending: INTERNAL MEDICINE
Payer: COMMERCIAL

## 2022-05-10 ENCOUNTER — APPOINTMENT (OUTPATIENT)
Dept: MEDSURG UNIT | Facility: CLINIC | Age: 57
End: 2022-05-10
Attending: INTERNAL MEDICINE
Payer: COMMERCIAL

## 2022-05-10 ENCOUNTER — HOSPITAL ENCOUNTER (OUTPATIENT)
Dept: CARDIOLOGY | Facility: CLINIC | Age: 57
Discharge: HOME | End: 2022-05-10
Attending: INTERNAL MEDICINE | Admitting: INTERNAL MEDICINE
Payer: COMMERCIAL

## 2022-05-10 VITALS
DIASTOLIC BLOOD PRESSURE: 93 MMHG | SYSTOLIC BLOOD PRESSURE: 153 MMHG | HEIGHT: 68 IN | OXYGEN SATURATION: 99 % | RESPIRATION RATE: 16 BRPM | BODY MASS INDEX: 27.28 KG/M2 | TEMPERATURE: 98.6 F | HEART RATE: 100 BPM | WEIGHT: 180 LBS

## 2022-05-10 DIAGNOSIS — Z94.1 TRANSPLANTED HEART (H): ICD-10-CM

## 2022-05-10 DIAGNOSIS — Z13.220 LIPID SCREENING: ICD-10-CM

## 2022-05-10 LAB
ALBUMIN UR-MCNC: 200 MG/DL
ANION GAP SERPL CALCULATED.3IONS-SCNC: 7 MMOL/L (ref 3–14)
APPEARANCE UR: CLEAR
BASOPHILS # BLD AUTO: 0 10E3/UL (ref 0–0.2)
BASOPHILS NFR BLD AUTO: 1 %
BILIRUB UR QL STRIP: NEGATIVE
BUN SERPL-MCNC: 28 MG/DL (ref 7–30)
CALCIUM SERPL-MCNC: 8.8 MG/DL (ref 8.5–10.1)
CHLORIDE BLD-SCNC: 109 MMOL/L (ref 94–109)
CHOLEST SERPL-MCNC: 241 MG/DL
CO2 SERPL-SCNC: 23 MMOL/L (ref 20–32)
COLOR UR AUTO: ABNORMAL
CREAT SERPL-MCNC: 1.87 MG/DL (ref 0.66–1.25)
EOSINOPHIL # BLD AUTO: 0.4 10E3/UL (ref 0–0.7)
EOSINOPHIL NFR BLD AUTO: 7 %
ERYTHROCYTE [DISTWIDTH] IN BLOOD BY AUTOMATED COUNT: 15.8 % (ref 10–15)
FASTING STATUS PATIENT QL REPORTED: ABNORMAL
GFR SERPL CREATININE-BSD FRML MDRD: 41 ML/MIN/1.73M2
GLUCOSE BLD-MCNC: 101 MG/DL (ref 70–99)
GLUCOSE UR STRIP-MCNC: NEGATIVE MG/DL
HCT VFR BLD AUTO: 36.2 % (ref 40–53)
HDLC SERPL-MCNC: 88 MG/DL
HGB BLD-MCNC: 10.8 G/DL (ref 13.3–17.7)
HGB BLD-MCNC: 10.9 G/DL (ref 13.3–17.7)
HGB BLD-MCNC: 11.4 G/DL (ref 13.3–17.7)
HGB UR QL STRIP: ABNORMAL
IMM GRANULOCYTES # BLD: 0 10E3/UL
IMM GRANULOCYTES NFR BLD: 0 %
KETONES UR STRIP-MCNC: NEGATIVE MG/DL
LDLC SERPL CALC-MCNC: 126 MG/DL
LEUKOCYTE ESTERASE UR QL STRIP: NEGATIVE
LVEF ECHO: NORMAL
LYMPHOCYTES # BLD AUTO: 1.6 10E3/UL (ref 0.8–5.3)
LYMPHOCYTES NFR BLD AUTO: 32 %
MAGNESIUM SERPL-MCNC: 2.2 MG/DL (ref 1.6–2.3)
MCH RBC QN AUTO: 25.1 PG (ref 26.5–33)
MCHC RBC AUTO-ENTMCNC: 31.5 G/DL (ref 31.5–36.5)
MCV RBC AUTO: 80 FL (ref 78–100)
MONOCYTES # BLD AUTO: 0.5 10E3/UL (ref 0–1.3)
MONOCYTES NFR BLD AUTO: 10 %
NEUTROPHILS # BLD AUTO: 2.4 10E3/UL (ref 1.6–8.3)
NEUTROPHILS NFR BLD AUTO: 50 %
NITRATE UR QL: NEGATIVE
NONHDLC SERPL-MCNC: 153 MG/DL
NRBC # BLD AUTO: 0 10E3/UL
NRBC BLD AUTO-RTO: 0 /100
OXYHGB MFR BLDV: 69 % (ref 92–100)
OXYHGB MFR BLDV: 84 % (ref 92–100)
PH UR STRIP: 6.5 [PH] (ref 5–7)
PHOSPHATE SERPL-MCNC: 3.2 MG/DL (ref 2.5–4.5)
PLATELET # BLD AUTO: 248 10E3/UL (ref 150–450)
POTASSIUM BLD-SCNC: 4.5 MMOL/L (ref 3.4–5.3)
RBC # BLD AUTO: 4.54 10E6/UL (ref 4.4–5.9)
RBC URINE: 2 /HPF
SODIUM SERPL-SCNC: 139 MMOL/L (ref 133–144)
SP GR UR STRIP: 1.01 (ref 1–1.03)
TRIGL SERPL-MCNC: 137 MG/DL
UROBILINOGEN UR STRIP-MCNC: NORMAL MG/DL
WBC # BLD AUTO: 4.9 10E3/UL (ref 4–11)
WBC URINE: 1 /HPF

## 2022-05-10 PROCEDURE — 83735 ASSAY OF MAGNESIUM: CPT | Performed by: INTERNAL MEDICINE

## 2022-05-10 PROCEDURE — 93306 TTE W/DOPPLER COMPLETE: CPT | Mod: 26 | Performed by: INTERNAL MEDICINE

## 2022-05-10 PROCEDURE — 36415 COLL VENOUS BLD VENIPUNCTURE: CPT | Performed by: INTERNAL MEDICINE

## 2022-05-10 PROCEDURE — 85025 COMPLETE CBC W/AUTO DIFF WBC: CPT | Performed by: INTERNAL MEDICINE

## 2022-05-10 PROCEDURE — C1894 INTRO/SHEATH, NON-LASER: HCPCS | Performed by: INTERNAL MEDICINE

## 2022-05-10 PROCEDURE — 93306 TTE W/DOPPLER COMPLETE: CPT

## 2022-05-10 PROCEDURE — 86833 HLA CLASS II HIGH DEFIN QUAL: CPT | Performed by: INTERNAL MEDICINE

## 2022-05-10 PROCEDURE — 93505 ENDOMYOCARDIAL BIOPSY: CPT | Mod: 26 | Performed by: INTERNAL MEDICINE

## 2022-05-10 PROCEDURE — 999N000142 HC STATISTIC PROCEDURE PREP ONLY

## 2022-05-10 PROCEDURE — 82810 BLOOD GASES O2 SAT ONLY: CPT | Mod: 91

## 2022-05-10 PROCEDURE — 86832 HLA CLASS I HIGH DEFIN QUAL: CPT | Performed by: INTERNAL MEDICINE

## 2022-05-10 PROCEDURE — 81001 URINALYSIS AUTO W/SCOPE: CPT | Performed by: INTERNAL MEDICINE

## 2022-05-10 PROCEDURE — 88350 IMFLUOR EA ADDL 1ANTB STN PX: CPT | Mod: TC | Performed by: INTERNAL MEDICINE

## 2022-05-10 PROCEDURE — 93505 ENDOMYOCARDIAL BIOPSY: CPT | Performed by: INTERNAL MEDICINE

## 2022-05-10 PROCEDURE — 999N000132 HC STATISTIC PP CARE STAGE 1

## 2022-05-10 PROCEDURE — 80048 BASIC METABOLIC PNL TOTAL CA: CPT | Performed by: INTERNAL MEDICINE

## 2022-05-10 PROCEDURE — 250N000009 HC RX 250: Performed by: INTERNAL MEDICINE

## 2022-05-10 PROCEDURE — 80061 LIPID PANEL: CPT | Performed by: INTERNAL MEDICINE

## 2022-05-10 PROCEDURE — 84100 ASSAY OF PHOSPHORUS: CPT | Performed by: INTERNAL MEDICINE

## 2022-05-10 PROCEDURE — 272N000001 HC OR GENERAL SUPPLY STERILE: Performed by: INTERNAL MEDICINE

## 2022-05-10 PROCEDURE — 99214 OFFICE O/P EST MOD 30 MIN: CPT | Mod: 25 | Performed by: NURSE PRACTITIONER

## 2022-05-10 PROCEDURE — 86352 CELL FUNCTION ASSAY W/STIM: CPT | Performed by: INTERNAL MEDICINE

## 2022-05-10 PROCEDURE — 85018 HEMOGLOBIN: CPT

## 2022-05-10 RX ORDER — LIDOCAINE 40 MG/G
CREAM TOPICAL
Status: COMPLETED | OUTPATIENT
Start: 2022-05-10 | End: 2022-05-10

## 2022-05-10 RX ADMIN — LIDOCAINE: 40 CREAM TOPICAL at 10:05

## 2022-05-10 NOTE — PROGRESS NOTES
"ADULT HEART TRANSPLANT     HPI:   Mr. Larios is a 57 year old male who presents to 2A today for routine heart transplant follow-up. Patient has history of ASD (s/p repair in childhood), AFib/AF (s/p ablation), NICM, diastolic dysfunction, alcohol abuse, Pierce's esophagus, ulcerative colitis, GIB (s/p splenic embolization), and hypothyroidism, now s/p OHT and bypass of persistent left SVC to right atrial appendage 2/24/19 who presents to clinic for ongoing evaluation and management. His postoperative course was c/b shock due to RV dysfunction requiring IABP support, small pneumoperitoneum (clinically insignificant), chest wall hematoma (former ICD site, s/p evacuation and drain placement 3/31/19), HCAP/klebsiella pneumonia, and FRANKLIN (required short-term HD, now recovered).  His biopsy 3/25/19 showed mild AMR1 with some staining for c4d. Repeat biopsy 3/28 showed improved AMR and less reactive capillary staining, and subsequent biopsies have now shown resolution of AMR and improved capillary staining. 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 He was seen by Dr Donis on 3/16/22 and switched from tacro to Imuran at that time and sirolimus goal decreased to 3-5 given ongoing renal dysfunction. He was also started on losartan for HTN.     Since last visit, patient reports feeling ok. He had a \"cold\" a few weeks ago, felt low grade fever but didn't take temp. Now better. Tested for COVID and negative. He reports chronic unchanged cough he attributes to post nasal gtt. Just ran out of zyrtec. He reports a few days of loose stools after starting Imuran but this has now resolved. No nausea or vomiting. BPs at home 140/s90s consistently. Denies orthopnea, PND, LE edema, MATA, presyncope, chest pain.     PAST MEDICAL HISTORY:  Past Medical History:   Diagnosis Date     Alcohol abuse      " Arthritis     hands, neck     ASD (atrial septal defect)     s/p repair age 3     Asthma      Atrial fibrillation (H)      Pierce's esophagus 10/4/2018     Pierce's esophagus      Cataract      CHF (congestive heart failure) (H)      Chronic rhinitis 10/4/2018     Chronic systolic heart failure (H) 10/4/2018     Clotting disorder (H)      Congenital cardiomyopathy in  (H)      Depression 10/4/2018     Depression      Diastolic dysfunction      DJD (degenerative joint disease)     neck     DJD (degenerative joint disease) - neck 10/4/2018     H/O congenital atrial septal defect (ASD) repair at age 3 10/4/2018     History of anesthesia complications     nausea     History of transfusion      Hypothyroidism      Hypothyroidism due to medication 10/4/2018     ICD (implantable cardioverter-defibrillator) in place      ICD, [employer] ; gen change 2018 10/4/2018     Intermittent asthma without complication 10/4/2018     Nonischemic cardiomyopathy (H) 10/4/2018     On amiodarone therapy 10/4/2018     Pacemaker      Paroxysmal atrial fibrillation (H) 10/4/2018     S/P ablation of atrial fibrillation 10/4/2018     Systolic heart failure (H)      Ulcerative colitis (H)      Ulcerative colitis (H)      Ventricular tachycardia (H) 10/4/2018     Ventricular tachycardia (H)        FAMILY HISTORY:  Family History   Problem Relation Age of Onset     Endometrial Cancer Mother      Osteoporosis Mother      Hypertension Father      Endometrial Cancer Maternal Grandmother      Hip fracture No family hx of      Nephrolithiasis No family hx of        SOCIAL HISTORY:  Social History     Socioeconomic History     Marital status: Single   Tobacco Use     Smoking status: Former Smoker     Packs/day: 1.00     Years: 25.00     Pack years: 25.00     Start date: 1980     Quit date: 2008     Years since quittin.5     Smokeless tobacco: Never Used   Substance and Sexual Activity     Alcohol use: Yes      Alcohol/week: 1.0 - 2.0 standard drink     Types: 1 - 2 Cans of beer per week     Drug use: No   Social History Narrative    Everton is a retired . He lives by himself in a townhouse in [place]. He has no lawn care responsibilities. He will be staying with his folks during his post-hospital early transplant care time. No history of TB exposures.     He works part-time at [employer].       CURRENT MEDICATIONS:  No current facility-administered medications on file prior to encounter.  acetaminophen (TYLENOL) 325 MG tablet, Take 2 tablets (650 mg) by mouth every 4 hours as needed for mild pain  albuterol (PROAIR HFA/PROVENTIL HFA/VENTOLIN HFA) 108 (90 Base) MCG/ACT inhaler, Inhale 2 puffs into the lungs every 6 hours as needed for shortness of breath / dyspnea or wheezing  amLODIPine (NORVASC) 5 MG tablet, TAKE 2 TABLETS BY MOUTH EVERY DAY  aspirin (ASA) 81 MG chewable tablet, Take 1 tablet (81 mg) by mouth daily  azaTHIOprine (IMURAN) 50 MG tablet, Take 2 tablets (100 mg) by mouth daily  calcium carbonate 600 mg-vitamin D 400 units (CALTRATE) 600-400 MG-UNIT per tablet, Take 1 tablet by mouth 2 times daily (with meals)  cetirizine (ZYRTEC) 10 MG tablet, Take 10 mg by mouth daily  DULoxetine (CYMBALTA) 20 MG capsule, Take 1 capsule (20 mg) by mouth daily  fluticasone (FLOVENT HFA) 220 MCG/ACT Inhaler, Inhale 2 puffs into the lungs 2 times daily  levothyroxine (SYNTHROID/LEVOTHROID) 75 MCG tablet, Take 1 tablet (75 mcg) by mouth daily  losartan (COZAAR) 25 MG tablet, Take 2 tablets (50 mg) by mouth daily  melatonin 3 MG tablet, Take 2 tablets (6 mg) by mouth At Bedtime (Patient taking differently: Take 5 mg by mouth nightly as needed)  multivitamin w/minerals (THERA-VIT-M) tablet, Take 1 tablet by mouth daily  omeprazole (PRILOSEC) 40 MG DR capsule, Take 1 capsule (40 mg) by mouth daily  ondansetron (ZOFRAN-ODT) 4 MG ODT tab, DISSOLVE 1 TABLET ON THE TONGUE EVERY EIGHT HOURS AS NEEDED  polyethylene glycol  "(MIRALAX/GLYCOLAX) powder, Take 1 capful by mouth daily  rosuvastatin (CRESTOR) 10 MG tablet, Take 2 tablets (20 mg) by mouth daily  senna (SENOKOT) 8.6 MG tablet, Take 2 tablets by mouth 2 times daily as needed for constipation  sirolimus (GENERIC EQUIVALENT) 1 MG tablet, Take 4 tablets (4 mg) by mouth daily  alendronate (FOSAMAX) 70 MG tablet, Take 1 tablet (70 mg) by mouth every 7 days Take 60 minutes before am meal with 8 oz. water. Remain upright for 30 minutes. (Patient not taking: Reported on 3/16/2022)        ROS:  See HPI    EXAM:  BP (!) 153/93 (BP Location: Right arm, Cuff Size: Adult Regular)   Pulse 100   Temp 98.6  F (37  C) (Oral)   Resp 16   Ht 1.727 m (5' 8\")   Wt 81.6 kg (180 lb)   SpO2 99%   BMI 27.37 kg/m      GENERAL: Appears comfortable, in no acute distress.   HEENT: Eye symmetrical, no discharge or icterus bilaterally. Mucous membranes moist and without lesions. No thrush.   CV: Tachycardic and regular, +S1S2, no murmur, rub, or gallop. JVP not visible.   RESPIRATORY: Respirations regular, even, and unlabored. Lungs CTA throughout.   GI: Soft and non distended with normoactive bowel sounds present in all quadrants. No tenderness, rebound, guarding. No hepatomegaly.   EXTREMITIES: No peripheral edema. 2+ bilateral pedal pulses.   NEUROLOGIC: Alert and oriented x 3. No focal deficits.   MUSCULOSKELETAL: No joint swelling or tenderness.   SKIN: No jaundice. No rashes or lesions.     Labs - reviewed with patient in clinic today:  CBC RESULTS:  Lab Results   Component Value Date    WBC 4.9 05/10/2022    WBC 5.0 04/09/2021    RBC 4.54 05/10/2022    RBC 4.61 04/09/2021    HGB 10.8 (L) 05/10/2022    HGB 11.4 (L) 05/10/2022    HGB 11.2 (A) 04/09/2021    HCT 36.2 (L) 05/10/2022    HCT 34.6 04/09/2021    MCV 80 05/10/2022    MCV 75 04/09/2021    MCH 25.1 (L) 05/10/2022    MCH 24.3 04/09/2021    MCHC 31.5 05/10/2022    MCHC 32.4 04/09/2021    RDW 15.8 (H) 05/10/2022    RDW 14.9 04/09/2021    PLT " 248 05/10/2022     04/09/2021       CMP RESULTS:  Lab Results   Component Value Date     05/10/2022     03/17/2021    POTASSIUM 4.5 05/10/2022    POTASSIUM 3.9 03/17/2021    CHLORIDE 109 05/10/2022    CHLORIDE 106 03/17/2021    CO2 23 05/10/2022    CO2 24 03/17/2021    ANIONGAP 7 05/10/2022    ANIONGAP 8 03/17/2021     (H) 05/10/2022    GLC 85 03/17/2021    BUN 28 05/10/2022    BUN 32 (H) 03/17/2021    CR 1.87 (H) 05/10/2022    CR 1.80 (H) 03/17/2021    GFRESTIMATED 41 (L) 05/10/2022    GFRESTIMATED 34 (L) 06/22/2021    GFRESTIMATED 41 (L) 03/17/2021    GFRESTBLACK 41 (L) 06/22/2021    GFRESTBLACK 47 (L) 03/17/2021    RADAMES 8.8 05/10/2022    RADAMES 9.0 03/17/2021    BILITOTAL 0.4 02/23/2022    BILITOTAL 0.5 03/17/2021    ALBUMIN 3.2 (L) 02/23/2022    ALBUMIN 3.6 03/17/2021    ALKPHOS 113 02/23/2022    ALKPHOS 131 03/17/2021    ALT 31 02/23/2022    ALT 49 03/17/2021    AST 24 02/23/2022    AST 48 (H) 03/17/2021        INR RESULTS:  Lab Results   Component Value Date    INR 1.17 (H) 09/23/2019       LIPID RESULTS:  Lab Results   Component Value Date    CHOL 282 (H) 02/23/2022    CHOL 255 (H) 03/17/2021    HDL 96 02/23/2022    HDL 89 03/17/2021     (H) 02/23/2022     (H) 03/17/2021    TRIG 114 02/23/2022    TRIG 128 03/17/2021       IMMUNOSUPPRESSANT LEVELS:  Lab Results   Component Value Date    CYCLSP <25 (L) 04/23/2020    DOSCYC 2852240 04/23/2020    TACROL 5.3 02/23/2022    TACROL 7.3 03/17/2021    DOSTAC 2/23/2022 02/23/2022    DOSTAC Not Provided 03/17/2021    RAPAMY 6.3 02/23/2022    RAPAMY 7.0 03/17/2021       No components found for: CK  Lab Results   Component Value Date    MAG 1.8 02/23/2022    MAG 1.6 03/17/2021     Lab Results   Component Value Date    A1C 5.5 02/23/2022    A1C 5.8 (H) 02/12/2020     Lab Results   Component Value Date    PHOS 3.3 02/23/2022    PHOS 2.6 03/17/2021     Lab Results   Component Value Date    NTBNP 10,986 (H) 11/15/2018     Lab Results    Component Value Date    [facility]ET [facility] 02/23/2022    [place] [facility] 03/17/2021    SAICELL Class I 02/23/2022    SAICELL Class I 03/17/2021    [id number] None 02/23/2022    IR2JRNVYJ None 03/17/2021    [id number] None 02/23/2022    [id number] None 03/17/2021    SAIREPCOM  02/23/2022      Test performed by modified procedure. Serum heat inactivated and tested by a modified ([place]) protocol including fetal calf serum addition. High-risk, mfi >3,000. Mod-risk, mfi 500-3,000.    SAIREPCOM  03/17/2021      Test performed by modified procedure. Serum heat inactivated and tested   by a modified ([place]) protocol including fetal calf serum addition.   High-risk, mfi >3,000. Mod-risk, mfi 500-3,000.       Lab Results   Component Value Date    SAIITESTME [facility] 02/23/2022    SAIITESTME [facility] 03/17/2021    SAIICELL Class II 02/23/2022    SAIICELL Class II 03/17/2021    [id number] None 02/23/2022    [id number] None 03/17/2021    [id number] None 02/23/2022    [id number] None 03/17/2021    SAIIREPCOM  02/23/2022      Test performed by modified procedure. Serum heat inactivated and tested by a modified ([place]) protocol including fetal calf serum addition. High-risk, mfi >3,000. Mod-risk, mfi 500-3,000.    SAIIREPCOM  03/17/2021      Test performed by modified procedure. Serum heat inactivated and tested   by a modified ([place]) protocol including fetal calf serum addition.   High-risk, mfi >3,000. Mod-risk, mfi 500-3,000.       Lab Results   Component Value Date    CSPEC EDTA PLASMA 03/17/2021       Diagnostic Studies:  Cor angio and RHC 2/23/22  Left Main   The vessel is large. There was 0% vessel disease.   Left Anterior Descending   The vessel is large. There was 0% vessel disease.   Left Circumflex   The vessel is moderate in size. There was 0% vessel disease.   Right Coronary Artery   The vessel is large. There was 0% vessel disease.     RA: 10  RV: 31 (11)  PA: 30/14 (21)  PVR: 0.91  Jaden CO: 6.3    CI: 3.3  TDCO: 7.4        CI: 3.8     Cardiac MRI with contrast and stress 2/23/22  1. The left ventricle is normal in cavity size and wall thickness. There are no regional wall motion abnormalities. The global systolic function is normal. The LVEF is 55%.  2. The right ventricle is normal in cavity size. The global systolic function is normal. The RVEF is 54%.   3. The left atrium is enlarged due to transplantation and the right atrium is normal in size.  4. There is no significant valvular disease.   5. There is patchy midmyocardial late gadolinium enhancement at the basal-mid RV insertion sites consistent with non ischemic fibrosis.    6. There is no ischemia on stress perfusion imaging.  7. There is no pericardial effusion.  8. There is no intracardiac thrombus.     CONCLUSIONS:   Post heart transplantation.  Normal biventricular size and function, LVEF 55 % and RVEF 54%.   No evidence of myocardial ischemia.    TTE 8/31/21  Global and regional left ventricular function is normal with an EF of 55-60%.  Global right ventricular function is normal.  No significant valular abnormaliites present.  Pulmonary artery systolic pressure is normal.  The inferior vena cava was normal in size with preserved respiratory  variability.  No pericardial effusion is present.    Assessment/Plan:  Mr. Larios is a 57 year old male who is s/p orthotopic heart transplant on 2/24/19 who presents to  for routine follow-up. Patient has history of ASD (s/p repair in childhood), AFib/AF (s/p ablation), NICM, diastolic dysfunction, alcohol abuse, Pierce's esophagus, ulcerative colitis, GIB (s/p splenic embolization), and hypothyroidism, now s/p OHT and bypass of persistent left SVC to right atrial appendage 2/24/19. Presented today for biopsy after switch in IMS (sirolimus changed to Imuran and tacrolimus goal decreased).     From a cardiac standpoint, he is doing well without signs of graft dysfunction. Echo and RHC normal today. Biopsy and PRA pending.  Immuknow also pending. BPs above goal so will increase losartan today. Sirolimus trough today will be 14hr level so not accurate and will need to be repeated as outpatient (asked lab to cancel it).     # Status post OHT on 2/24/19, history of NICM (congenital)  # s/p bypass of persistent left SVC to right atrial appendage  # Chronic immunosuppression  * Rejection history: pAMR1 3/2019.   * Recent immunosuppression changes: tacro changed to Imuran and sirolimus goal decreased  * Last biopsy: 3/2021 [facility]  * DSAs: none  * Immuknow: 305 in 2020, unable to obtain sample last year, pending today  * AlloMap: 30 on 2/23/22  * Intolerance to medications: none    Postoperative course was c/b shock due to RV dysfunction requiring IABP support, small pneumoperitoneum (clinically insignificant), chest wall hematoma (former ICD site, s/p evacuation and drain placement 3/31/19), HCAP/klebsiella pneumonia, and FRANKLIN (required short-term HD, now recovered). Biopsy 3/25/19 showed mild AMR1 with some staining for c4d. Repeat biopsy 3/28 showed improved AMR and less reactive capillary staining, and subsequent biopsies have now shown resolution of AMR and improved capillary staining.     Immunosuppression:  - Imuran 100 mg daily, WBC 4.9 today  - Sirolimus, trough level goal 3-5, pending today but NOT A TROUGH.    Prophylaxis:  - PPI: omeprazole 40 mg (note hx of Barretts esophagus)  - CAV: ASA 81 mg and rosuvastatin 20 mg (recently increased)  - Osteoporosis: calcium and vitamin D    Serostatus: CMV: D+/R+. EBV: D+/R+. Toxo: D-/R-.    # HTN. Uncontrolled, increase losartan 75 mg daily and BMP in ~10 days    # CKD. Recent baseline Cr 1.7-1.9, tacrolimus decreased in March as above, Cr 1.8 today. Encourage PO intake (RA 4 today).     # UC and Barretts esophagus. Followed by GI.     # Osteoporosis with L1 compression fracture history. Previously on Fosamax, ran out of refilled. Need to follow-up with endocrine as outpatient.               25  minutes spent face-to-face with patient, >50% in counseling and/or coordination of care as described above      Candida Srivastava DNP, NP-C  5/10/2022          GERRY JACQUES

## 2022-05-10 NOTE — PROGRESS NOTES
Pt arrived to 2A s/p [facility] with biopsy. VSS. Pt denies pain. CINDY WNL. Nazia Srivastava CNP at bedside. Pt tolerating PO intake. Discharge instructions reviewed and pt verbalized understanding. Pt discharged to home.

## 2022-05-10 NOTE — Clinical Note
dry, intact, no bleeding and no hematoma. RIJ sheath removed, manual pressure held to hemostasis, Band-aid

## 2022-05-10 NOTE — PROGRESS NOTES
Pre procedure complete. Awaiting consent. VSS. Pt denies pain. Appropriately NPO. Lidocaine applied to RIJ per orders. Pt will drive self home.

## 2022-05-10 NOTE — DISCHARGE INSTRUCTIONS
[facility]                        Interventional Cardiology  Discharge Instructions   Post Right Heart Cath      AFTER YOU GO HOME:  DO drink plenty of fluids  DO resume your regular diet and medications unless otherwise instructed by your Primary Physician  Do not scrub the procedure site vigorously  No lotion or powder to the puncture site for 3 days  Keep dressing in place for 24 hours    CALL YOUR PRIMARY PHYSICIAN IF: You may resume all normal activity.  Monitor neck site for bleeding, swelling, or voice changes. If you notice bleeding or swelling immediately apply pressure to the site and call number below to speak with Cardiology Fellow.  If you experience any changes in your breathing you should call your doctor immediately or come to the closest Emergency Department.  Do not drive yourself.    ADDITIONAL INSTRUCTIONS: Medications: You are to resume all home medications including anticoagulation therapy unless otherwise advised by your primary cardiologist or nurse coordinator.    Follow Up: Per your primary cardiology team    If you have any questions or concerns regarding your procedure site please call [phone] at anytime and ask for Cardiology Fellow on call.  They are available 24 hours a day.  You may also contact the Cardiology Clinic after hours number at [phone].                                                       Telephone Numbers [phone] Monday-Friday 8:00 am to 4:30 pm    [phone] After 4:30 pm Monday-Friday, Weekends & Holidays  Ask for Interventional Cardiologist on call. Someone is on call 24 hours/day   [facility] toll free number [phone] Monday-Friday 8:00 am to 4:30 pm   [facility] Emergency Dept [phone]

## 2022-05-11 LAB
PATH REPORT.COMMENTS IMP SPEC: NORMAL
PATH REPORT.FINAL DX SPEC: NORMAL
PATH REPORT.GROSS SPEC: NORMAL
PATH REPORT.MICROSCOPIC SPEC OTHER STN: NORMAL
PATH REPORT.RELEVANT HX SPEC: NORMAL
PHOTO IMAGE: NORMAL

## 2022-05-11 PROCEDURE — 88307 TISSUE EXAM BY PATHOLOGIST: CPT | Mod: 26 | Performed by: PATHOLOGY

## 2022-05-11 PROCEDURE — 88346 IMFLUOR 1ST 1ANTB STAIN PX: CPT | Mod: 26 | Performed by: PATHOLOGY

## 2022-05-11 PROCEDURE — 88350 IMFLUOR EA ADDL 1ANTB STN PX: CPT | Mod: 26 | Performed by: PATHOLOGY

## 2022-05-12 LAB
DONOR IDENTIFICATION: NORMAL
DSA COMMENTS: NORMAL
DSA PRESENT: NO
DSA TEST METHOD: NORMAL
IMMUKNOW IMMUNE CELL FUNCTION: 295 NG/ML
ORGAN: NORMAL
SA 1 CELL: NORMAL
SA 1 TEST METHOD: NORMAL
SA 2 CELL: NORMAL
SA 2 TEST METHOD: NORMAL
SA1 HI RISK ABY: NORMAL
SA1 MOD RISK ABY: NORMAL
SA2 HI RISK ABY: NORMAL
SA2 MOD RISK ABY: NORMAL
UNACCEPTABLE ANTIGENS: NORMAL
UNOS CPRA: 0
ZZZSA 1  COMMENTS: NORMAL
ZZZSA 2 COMMENTS: NORMAL

## 2022-05-16 ENCOUNTER — TELEPHONE (OUTPATIENT)
Dept: TRANSPLANT | Facility: CLINIC | Age: 57
End: 2022-05-16
Payer: COMMERCIAL

## 2022-05-16 DIAGNOSIS — Z94.1 TRANSPLANTED HEART (H): ICD-10-CM

## 2022-05-16 DIAGNOSIS — Z94.1 HEART REPLACED BY TRANSPLANT (H): ICD-10-CM

## 2022-05-16 DIAGNOSIS — Z94.1 TRANSPLANTED HEART (H): Primary | ICD-10-CM

## 2022-05-16 RX ORDER — LOSARTAN POTASSIUM 25 MG/1
75 TABLET ORAL DAILY
Qty: 270 TABLET | Refills: 3 | Status: SHIPPED | OUTPATIENT
Start: 2022-05-16 | End: 2023-05-18

## 2022-05-16 RX ORDER — ROSUVASTATIN CALCIUM 40 MG/1
40 TABLET, COATED ORAL DAILY
Qty: 90 TABLET | Refills: 3 | Status: SHIPPED | OUTPATIENT
Start: 2022-05-16 | End: 2023-05-30

## 2022-05-16 NOTE — TELEPHONE ENCOUNTER
Hbx negative (after switching tac to imuran).  No DSAS  Immuknow = 295.   Urine proteing >200 previously >300  Rapa goal 5-7.   Cr = 1.8.     After reviewing the above with Dr. Donis the following decided:     Increase crestor to 40 mg daily. Continue to watch diet/exercise  New rapa goal 5-7. Pt will repeat rapa level this week.   Nephrology consult submitted.     Pt verbalized understanding of the above. No further questions at this time.

## 2022-05-17 DIAGNOSIS — Z94.1 TRANSPLANTED HEART (H): Primary | ICD-10-CM

## 2022-05-28 ENCOUNTER — LAB (OUTPATIENT)
Dept: LAB | Facility: CLINIC | Age: 57
End: 2022-05-28
Payer: COMMERCIAL

## 2022-05-28 DIAGNOSIS — Z94.1 TRANSPLANTED HEART (H): ICD-10-CM

## 2022-05-28 LAB
ANION GAP SERPL CALCULATED.3IONS-SCNC: 6 MMOL/L (ref 3–14)
BUN SERPL-MCNC: 31 MG/DL (ref 7–30)
CALCIUM SERPL-MCNC: 8.8 MG/DL (ref 8.5–10.1)
CHLORIDE BLD-SCNC: 107 MMOL/L (ref 94–109)
CO2 SERPL-SCNC: 24 MMOL/L (ref 20–32)
CREAT SERPL-MCNC: 1.87 MG/DL (ref 0.66–1.25)
ERYTHROCYTE [DISTWIDTH] IN BLOOD BY AUTOMATED COUNT: 15.7 % (ref 10–15)
GFR SERPL CREATININE-BSD FRML MDRD: 41 ML/MIN/1.73M2
GLUCOSE BLD-MCNC: 97 MG/DL (ref 70–99)
HCT VFR BLD AUTO: 34.8 % (ref 40–53)
HGB BLD-MCNC: 11.1 G/DL (ref 13.3–17.7)
MCH RBC QN AUTO: 25.3 PG (ref 26.5–33)
MCHC RBC AUTO-ENTMCNC: 31.9 G/DL (ref 31.5–36.5)
MCV RBC AUTO: 80 FL (ref 78–100)
PLATELET # BLD AUTO: 193 10E3/UL (ref 150–450)
POTASSIUM BLD-SCNC: 4.4 MMOL/L (ref 3.4–5.3)
RBC # BLD AUTO: 4.38 10E6/UL (ref 4.4–5.9)
SODIUM SERPL-SCNC: 137 MMOL/L (ref 133–144)
WBC # BLD AUTO: 4.2 10E3/UL (ref 4–11)

## 2022-05-28 PROCEDURE — 80195 ASSAY OF SIROLIMUS: CPT

## 2022-05-28 PROCEDURE — 80048 BASIC METABOLIC PNL TOTAL CA: CPT

## 2022-05-28 PROCEDURE — 36415 COLL VENOUS BLD VENIPUNCTURE: CPT

## 2022-05-28 PROCEDURE — 85027 COMPLETE CBC AUTOMATED: CPT

## 2022-05-29 LAB
SIROLIMUS BLD-MCNC: 5.4 UG/L (ref 5–15)
TME LAST DOSE: NORMAL H
TME LAST DOSE: NORMAL H

## 2022-06-12 ENCOUNTER — HEALTH MAINTENANCE LETTER (OUTPATIENT)
Age: 57
End: 2022-06-12

## 2022-06-28 DIAGNOSIS — M80.00XD AGE-RELATED OSTEOPOROSIS WITH CURRENT PATHOLOGICAL FRACTURE WITH ROUTINE HEALING, SUBSEQUENT ENCOUNTER: ICD-10-CM

## 2022-06-28 DIAGNOSIS — R79.89 LOW TSH LEVEL: ICD-10-CM

## 2022-06-28 DIAGNOSIS — E03.2 HYPOTHYROIDISM DUE TO MEDICATION: Chronic | ICD-10-CM

## 2022-06-28 DIAGNOSIS — Z92.241 HISTORY OF CORTICOSTEROID THERAPY: ICD-10-CM

## 2022-06-29 RX ORDER — LEVOTHYROXINE SODIUM 75 UG/1
75 TABLET ORAL DAILY
Qty: 90 TABLET | Refills: 0 | Status: CANCELLED | OUTPATIENT
Start: 2022-06-29

## 2022-06-29 NOTE — TELEPHONE ENCOUNTER
"Routing refill request to provider for review/approval because:  Labs out of range:  tsh    Last Written Prescription Date:  7/28/21  Last Fill Quantity: 90,  # refills: 3   Last office visit provider:  9/28/21     Requested Prescriptions   Pending Prescriptions Disp Refills     levothyroxine (SYNTHROID/LEVOTHROID) 75 MCG tablet 90 tablet 3     Sig: Take 1 tablet (75 mcg) by mouth daily       Thyroid Protocol Failed - 6/28/2022  3:27 PM        Failed - Normal TSH on file in past 12 months     Recent Labs   Lab Test 02/23/22  0727   TSH 0.33*              Passed - Patient is 12 years or older        Passed - Recent (12 mo) or future (30 days) visit within the authorizing provider's specialty     Patient has had an office visit with the authorizing provider or a provider within the authorizing providers department within the previous 12 mos or has a future within next 30 days. See \"Patient Info\" tab in inbasket, or \"Choose Columns\" in Meds & Orders section of the refill encounter.              Passed - Medication is active on med list             Nelida Johnson RN 06/29/22 6:38 PM  "

## 2022-07-01 ENCOUNTER — OFFICE VISIT (OUTPATIENT)
Dept: INTERNAL MEDICINE | Facility: CLINIC | Age: 57
End: 2022-07-01
Payer: COMMERCIAL

## 2022-07-01 VITALS
BODY MASS INDEX: 29.76 KG/M2 | DIASTOLIC BLOOD PRESSURE: 83 MMHG | OXYGEN SATURATION: 97 % | WEIGHT: 195.7 LBS | HEART RATE: 102 BPM | SYSTOLIC BLOOD PRESSURE: 134 MMHG

## 2022-07-01 DIAGNOSIS — E78.00 HYPERCHOLESTEROLEMIA: ICD-10-CM

## 2022-07-01 DIAGNOSIS — I10 ESSENTIAL HYPERTENSION: ICD-10-CM

## 2022-07-01 DIAGNOSIS — J45.20 MILD INTERMITTENT ASTHMA WITHOUT COMPLICATION: ICD-10-CM

## 2022-07-01 DIAGNOSIS — I25.10 CORONARY ARTERY DISEASE DUE TO CALCIFIED CORONARY LESION: ICD-10-CM

## 2022-07-01 DIAGNOSIS — K22.70 BARRETT'S ESOPHAGUS WITHOUT DYSPLASIA: ICD-10-CM

## 2022-07-01 DIAGNOSIS — N18.32 CHRONIC RENAL IMPAIRMENT, STAGE 3B (H): ICD-10-CM

## 2022-07-01 DIAGNOSIS — M81.0 OSTEOPOROSIS, UNSPECIFIED OSTEOPOROSIS TYPE, UNSPECIFIED PATHOLOGICAL FRACTURE PRESENCE: ICD-10-CM

## 2022-07-01 DIAGNOSIS — Z51.81 ENCOUNTER FOR THERAPEUTIC DRUG MONITORING: ICD-10-CM

## 2022-07-01 DIAGNOSIS — E03.9 HYPOTHYROIDISM, UNSPECIFIED TYPE: Primary | ICD-10-CM

## 2022-07-01 DIAGNOSIS — Z94.1 TRANSPLANTED HEART (H): ICD-10-CM

## 2022-07-01 DIAGNOSIS — I25.84 CORONARY ARTERY DISEASE DUE TO CALCIFIED CORONARY LESION: ICD-10-CM

## 2022-07-01 DIAGNOSIS — Z87.19 HISTORY OF CHRONIC ULCERATIVE COLITIS: ICD-10-CM

## 2022-07-01 LAB
ALBUMIN SERPL BCG-MCNC: 4.3 G/DL (ref 3.5–5.2)
ALP SERPL-CCNC: 117 U/L (ref 40–129)
ALT SERPL W P-5'-P-CCNC: 26 U/L (ref 10–50)
ANION GAP SERPL CALCULATED.3IONS-SCNC: 10 MMOL/L (ref 7–15)
AST SERPL W P-5'-P-CCNC: 26 U/L (ref 10–50)
BILIRUB SERPL-MCNC: 0.4 MG/DL
BUN SERPL-MCNC: 31.9 MG/DL (ref 8–23)
CALCIUM SERPL-MCNC: 9 MG/DL (ref 8.6–10)
CHLORIDE SERPL-SCNC: 106 MMOL/L (ref 98–107)
CK SERPL-CCNC: 222 U/L (ref 39–308)
CREAT SERPL-MCNC: 2 MG/DL (ref 0.67–1.17)
DEPRECATED HCO3 PLAS-SCNC: 24 MMOL/L (ref 22–29)
GFR SERPL CREATININE-BSD FRML MDRD: 38 ML/MIN/1.73M2
GLUCOSE SERPL-MCNC: 92 MG/DL (ref 70–99)
POTASSIUM SERPL-SCNC: 4.9 MMOL/L (ref 3.4–5.3)
PROT SERPL-MCNC: 7.2 G/DL (ref 6.4–8.3)
SODIUM SERPL-SCNC: 140 MMOL/L (ref 136–145)
T4 FREE SERPL-MCNC: 1.23 NG/DL (ref 0.9–1.7)
TSH SERPL DL<=0.005 MIU/L-ACNC: 0.61 UIU/ML (ref 0.3–4.2)

## 2022-07-01 PROCEDURE — 84439 ASSAY OF FREE THYROXINE: CPT | Performed by: INTERNAL MEDICINE

## 2022-07-01 PROCEDURE — 80053 COMPREHEN METABOLIC PANEL: CPT | Performed by: INTERNAL MEDICINE

## 2022-07-01 PROCEDURE — 82550 ASSAY OF CK (CPK): CPT | Performed by: INTERNAL MEDICINE

## 2022-07-01 PROCEDURE — 99214 OFFICE O/P EST MOD 30 MIN: CPT | Performed by: INTERNAL MEDICINE

## 2022-07-01 PROCEDURE — 36415 COLL VENOUS BLD VENIPUNCTURE: CPT | Performed by: INTERNAL MEDICINE

## 2022-07-01 PROCEDURE — 84443 ASSAY THYROID STIM HORMONE: CPT | Performed by: INTERNAL MEDICINE

## 2022-07-01 ASSESSMENT — ASTHMA QUESTIONNAIRES
QUESTION_1 LAST FOUR WEEKS HOW MUCH OF THE TIME DID YOUR ASTHMA KEEP YOU FROM GETTING AS MUCH DONE AT WORK, SCHOOL OR AT HOME: NONE OF THE TIME
ACT_TOTALSCORE: 25
QUESTION_5 LAST FOUR WEEKS HOW WOULD YOU RATE YOUR ASTHMA CONTROL: COMPLETELY CONTROLLED
QUESTION_3 LAST FOUR WEEKS HOW OFTEN DID YOUR ASTHMA SYMPTOMS (WHEEZING, COUGHING, SHORTNESS OF BREATH, CHEST TIGHTNESS OR PAIN) WAKE YOU UP AT NIGHT OR EARLIER THAN USUAL IN THE MORNING: NOT AT ALL
QUESTION_2 LAST FOUR WEEKS HOW OFTEN HAVE YOU HAD SHORTNESS OF BREATH: NOT AT ALL
ACT_TOTALSCORE: 25
QUESTION_4 LAST FOUR WEEKS HOW OFTEN HAVE YOU USED YOUR RESCUE INHALER OR NEBULIZER MEDICATION (SUCH AS ALBUTEROL): NOT AT ALL

## 2022-07-01 ASSESSMENT — PATIENT HEALTH QUESTIONNAIRE - PHQ9
SUM OF ALL RESPONSES TO PHQ QUESTIONS 1-9: 2
10. IF YOU CHECKED OFF ANY PROBLEMS, HOW DIFFICULT HAVE THESE PROBLEMS MADE IT FOR YOU TO DO YOUR WORK, TAKE CARE OF THINGS AT HOME, OR GET ALONG WITH OTHER PEOPLE: NOT DIFFICULT AT ALL
SUM OF ALL RESPONSES TO PHQ QUESTIONS 1-9: 2

## 2022-07-01 NOTE — PROGRESS NOTES
[facility] Follow Up Note    Everton Larios   57 year old male    Date of Visit: 7/1/2022    Chief Complaint   Patient presents with     RECHECK     Subjective  57-year-old male with cardiac transplant for congenital cardiomyopathy.  He developed chronic renal insufficiency with complications of his immunosuppression.    Last August creatinine was 1.48.  In May of this year creatinine was 1.87.    Patient's blood pressure has been increasing and losartan was added in March, increased to 75 mg a day in May.  Still on amlodipine 10 mg a day.    Blood pressure has been fluctuating between 130s to 140s over 80s to 90s.  But he does check it mainly at work.    He does have a follow-up appointment with his nephrology clinic in August.    Denies lightheaded dizzy spells.  He does have some mild trace edema, somewhat higher recently.    Is gained 10 pounds, has been eating better.  He is planning to do some bicycle riding and increased activity.  Doing some intermittent walking.  Denies increasing dyspnea on exertion.    March 2022 heart echo with ejection fraction 60-65%.  Normal pulmonary artery pressure, just mild LVH and no valve problems.    Cardiac MRI February 2022 was negative for ischemia.    He does have some mild coronary disease in his transplant heart and rosuvastatin was increased to 40 mg a day.  Still on aspirin 81 mg.    In May of this year her LDL was 126 and HDL 88.    Past history of hypothyroidism with Synthroid dose reduced to 75 mcg last July.  In February his TSH was coming off some of 0.33 with normal T4 and he continued on same dose of Synthroid.    He has chronic tachycardia with his transplant heart.  No tremor or diarrhea complaints.  He has been gaining weight as above, 10 pounds.    Chronic dysthymia is improved.  He did lose his dad last year.  On Cymbalta 20 mg a day he feels his mood is good.  His job is going well.    Patient no longer takes Fosamax.  He had a history of T12  compression fracture.  He denies any side effects from that, but he stopped taking that either on his own or with his worsening renal insufficiency as above.    No flare of his asthma, its not been an issue since being on immunosuppression with the cardiac transplant.  Still some chronic postnasal drip and uses Flonase and saline irrigation.    History of pan ulcerative colitis diagnosed in  but no recurrence.  He had a colonoscopy in 2022 that shows an active ulcerative colitis on biopsy, no polyps.    EGD in March of this year showed Pierce's esophagus but no new swallowing difficulty.  On Prilosec 40 mg daily.  No blood in stool or melena.    No new cough or shortness of breath or fever.  No urinary complaints.    No complaints of some mild right hand carpal tunnel symptoms he had previously.    Patient states he had shingles about a year and a half ago, does not wish to get the Shingrix vaccine at this time.    He did see dermatology last July for yearly skin exam and no new skin complaints currently.    PMHx:    Past Medical History:   Diagnosis Date     Alcohol abuse      Arthritis     hands, neck     ASD (atrial septal defect)     s/p repair age 3     Asthma      Atrial fibrillation (H)      Pierce's esophagus 10/4/2018     Pierce's esophagus      Cataract      CHF (congestive heart failure) (H)      Chronic rhinitis 10/4/2018     Chronic systolic heart failure (H) 10/4/2018     Clotting disorder (H)      Congenital cardiomyopathy in  (H)      Depression 10/4/2018     Depression      Diastolic dysfunction      DJD (degenerative joint disease)     neck     DJD (degenerative joint disease) - neck 10/4/2018     H/O congenital atrial septal defect (ASD) repair at age 3 10/4/2018     History of anesthesia complications     nausea     History of transfusion      Hypothyroidism      Hypothyroidism due to medication 10/4/2018     ICD (implantable cardioverter-defibrillator) in place      ICD,  [employer] scientific 2008; gen change 2/2018 10/4/2018     Intermittent asthma without complication 10/4/2018     Nonischemic cardiomyopathy (H) 10/4/2018     On amiodarone therapy 10/4/2018     Pacemaker      Paroxysmal atrial fibrillation (H) 10/4/2018     S/P ablation of atrial fibrillation 10/4/2018     Systolic heart failure (H)      Ulcerative colitis (H) 2005     Ulcerative colitis (H)      Ventricular tachycardia (H) 10/4/2018     Ventricular tachycardia (H)      PSHx:    Past Surgical History:   Procedure Laterality Date     ABLATION OF DYSRHYTHMIC FOCUS      for A fib     AICD, DUAL CHAMBER       ASD REPAIR  01/01/1968     BRONCHOSCOPY (RIGID OR FLEXIBLE), DIAGNOSTIC N/A 04/30/2019    Procedure: BRONCHOSCOPY, WITH BAL;  Surgeon: Margot Chiang MD;  Location:  GI     CARDIAC DEFIBRILLATOR PLACEMENT      x2--last was Feb 2018     COLONOSCOPY N/A 02/20/2020    Procedure: COLONOSCOPY, WITH POLYPECTOMY AND BIOPSY;  Surgeon: Ranjeet Cooper MD;  Location:  GI     COLONOSCOPY N/A 02/05/2015    Procedure: COLONOSCOPY with biopsy;  Surgeon: Fernie Akers MD;  Location: [facility] GI;  Service:      COLONOSCOPY N/A 12/19/2017    Procedure: COLONOSCOPY with biopsies and polypectomies using snare;  Surgeon: Gael Romero MD;  Location: [facility] GI;  Service:      COLONOSCOPY N/A 3/8/2022    Procedure: COLONOSCOPY, WITH POLYPECTOMY AND BIOPSY;  Surgeon: Paddy Saldivar DO;  Location:  GI     CV CORONARY ANGIOGRAM N/A 02/12/2020    Procedure: CV CORONARY ANGIOGRAM;  Surgeon: Isiah Mcallister MD;  Location:  HEART CARDIAC CATH LAB     CV CORONARY ANGIOGRAM N/A 03/17/2021    Procedure: CV CORONARY ANGIOGRAM;  Surgeon: Santino Mckeon MD;  Location:  HEART CARDIAC CATH LAB     CV CORONARY ANGIOGRAM N/A 2/23/2022    Procedure: CV CORONARY ANGIOGRAM;  Surgeon: Yves Rodrigues MD;  Location:  HEART CARDIAC CATH LAB     CV HEART BIOPSY N/A 03/04/2019    Procedure: Heart Biopsy;  Surgeon:  Abhijeet Titus MD;  [facility]     CV HEART BIOPSY N/A 03/25/2019    Procedure: Heart Biopsy;  Surgeon: Yves Rodrigues MD;  [facility]     CV HEART BIOPSY N/A 03/28/2019    Procedure: Heart Cath Heart Biopsy;  Surgeon: Yves Rodrigues MD;  [facility]     CV HEART BIOPSY N/A 04/10/2019    Procedure: CV HEART BIOPSY;  Surgeon: Isiah Mcallister MD;  [facility]     CV HEART BIOPSY N/A 04/24/2019    Procedure: CV HEART BIOPSY;  Surgeon: Isiah Mcallister MD;  [facility]     CV HEART BIOPSY N/A 05/08/2019    Procedure: CV HEART BIOPSY;  Surgeon: Isiah Mcallister MD;  [facility]     CV HEART BIOPSY N/A 06/04/2019    Procedure: CV HEART BIOPSY;  Surgeon: Alton Solomon MD;  [facility]     CV HEART BIOPSY N/A 05/22/2019    Procedure: CV HEART BIOPSY;  Surgeon: Yves Rodrigues MD;  [facility]     CV HEART BIOPSY N/A 07/17/2019    Procedure: CV HEART BIOPSY;  Surgeon: Isiah Mcallister MD;  [facility]     CV HEART BIOPSY N/A 08/13/2019    Procedure: CV HEART BIOPSY;  Surgeon: Jackson Patten MD;  [facility]     CV HEART BIOPSY N/A 10/15/2019    Procedure: CV HEART BIOPSY;  Surgeon: Santino Mckeon MD;  [facility]     CV HEART BIOPSY N/A 02/12/2020    Procedure: CV HEART BIOPSY;  Surgeon: Isiah Mcallister MD;  [facility]     CV HEART BIOPSY N/A 05/05/2020    Procedure: CV HEART BIOPSY;  Surgeon: Yves Rodrigues MD;  [facility]     CV HEART BIOPSY N/A 03/17/2021    Procedure: CV HEART BIOPSY;  Surgeon: Santino Mckeon MD;  [facility]     CV HEART BIOPSY N/A 5/10/2022    Procedure: Heart Biopsy;  Surgeon: Yves Rodrigues,  MD;  Location:  HEART CARDIAC CATH LAB     CV INTRA AORTIC BALLOON N/A 02/18/2019    Procedure: Intra-Aortic Balloon;  Surgeon: Alton Solomon MD;  Location:  HEART CARDIAC CATH LAB     CV INTRA AORTIC BALLOON N/A 02/20/2019    Procedure: Replace subclavian IABP;  Surgeon: Yves Rodrigues MD;  Location:  HEART CARDIAC CATH LAB     CV INTRAVASULAR ULTRASOUND N/A 02/12/2020    Procedure: CV INTRAVASCULAR ULTRASOUND;  Surgeon: Isiah Mcallister MD;  Location:  HEART CARDIAC CATH LAB     CV LEFT HEART CATH N/A 03/19/2019    Procedure: Left Heart Cath;  Surgeon: Yves Rodrigues MD;  Location:  HEART CARDIAC CATH LAB     CV LEFT HEART CATH N/A 02/12/2020    Procedure: Left Heart Cath;  Surgeon: Isiah Mcallister MD;  Location:  HEART CARDIAC CATH LAB     CV MYOCARDIAL BIOPSY N/A 03/19/2019    Procedure: RHC/HBx - Femoral access for 3/19 per Nimisha GONZALEZ;  Surgeon: Yves Rodrigues MD;  Location:  HEART CARDIAC CATH LAB     CV MYOCARDIAL BIOPSY N/A 03/11/2019    Procedure: ADD ON RHC/HBX;  Surgeon: Alton Solomon MD;  Location:  HEART CARDIAC CATH LAB     CV RIGHT HEART CATH MEASUREMENTS RECORDED N/A 03/25/2019    Procedure: Right Heart Cath;  Surgeon: Yves Rodrigues MD;  Location:  HEART CARDIAC CATH LAB     CV RIGHT HEART CATH MEASUREMENTS RECORDED N/A 03/28/2019    Procedure: Heart Cath Right Heart Cath;  Surgeon: Yves Rodrigues MD;  Location:  HEART CARDIAC CATH LAB     CV RIGHT HEART CATH MEASUREMENTS RECORDED N/A 04/24/2019    Procedure: CV RIGHT HEART CATH;  Surgeon: Isiah Mcallister MD;  Location:  HEART CARDIAC CATH LAB     CV RIGHT HEART CATH MEASUREMENTS RECORDED N/A 06/04/2019    Procedure: CV RIGHT HEART CATH;  Surgeon: Alton Solomon MD;  Location:  HEART CARDIAC CATH LAB     CV RIGHT HEART CATH MEASUREMENTS RECORDED N/A 05/22/2019    Procedure: CV RIGHT HEART CATH;  Surgeon: Yves Rodrigues MD;  Location:  HEART CARDIAC CATH LAB      CV RIGHT HEART CATH MEASUREMENTS RECORDED N/A 08/13/2019    Procedure: CV RIGHT HEART CATH;  Surgeon: Jackson Patten MD;  Location:  HEART CARDIAC CATH LAB     CV RIGHT HEART CATH MEASUREMENTS RECORDED N/A 10/15/2019    Procedure: CV RIGHT HEART CATH;  Surgeon: Santino Mckeon MD;  Location:  HEART CARDIAC CATH LAB     CV RIGHT HEART CATH MEASUREMENTS RECORDED N/A 02/12/2020    Procedure: CV RIGHT HEART CATH;  Surgeon: Isiah Mcallister MD;  Location:  HEART CARDIAC CATH LAB     CV RIGHT HEART CATH MEASUREMENTS RECORDED N/A 03/17/2021    Procedure: CV RIGHT HEART CATH;  Surgeon: Santino Mckeon MD;  Location:  HEART CARDIAC CATH LAB     CV RIGHT HEART CATH MEASUREMENTS RECORDED N/A 2/23/2022    Procedure: CV RIGHT HEART CATH;  Surgeon: Yves Rodrigues MD;  Location:  HEART CARDIAC CATH LAB     CV RIGHT HEART CATH MEASUREMENTS RECORDED N/A 5/10/2022    Procedure: Right Heart Catheterization;  Surgeon: Yves Rodrigues MD;  Location:  HEART CARDIAC CATH LAB     ESOPHAGOSCOPY, GASTROSCOPY, DUODENOSCOPY (EGD), COMBINED N/A 12/19/2017    Procedure: ESOPHAGOGASTRODUODENOSCOPY (EGD) with biopsies;  Surgeon: Gael Romero MD;  Location: [facility];  Service:      ESOPHAGOSCOPY, GASTROSCOPY, DUODENOSCOPY (EGD), COMBINED N/A 3/8/2022    Procedure: ESOPHAGOGASTRODUODENOSCOPY, WITH BIOPSY;  Surgeon: Paddy Saldivar DO;  Location: [facility]     H STATISTIC PICC LINE INSERTION >5YR, FAILED Bilateral 10/28/2021    L sided SVC     HAND SURGERY Left      HC REVISE MEDIAN N/CARPAL TUNNEL SURG  09/21/2016    Procedure: OPEN RIGHT CARPAL TUNNEL RELEASE CORTISONE INJECTION RIGHT THUMB;  Surgeon: Duong Santoyo MD;  Location: [facility] OR;  Service: Orthopedics     INCISION AND DRAINAGE CHEST WASHOUT, COMBINED N/A 03/21/2019    Procedure: Incision And Drainage; Evacuation Right Chest Wall Hematoma;  Surgeon: Dewayne House MD;  Location: [facility]     INSERT INTRAAORTIC BALLOON  PUMP Left 02/19/2019    Procedure: Insert left  Subclavian Balloon Pump,  Removal Right femoral arterial balloom pump sheath;  Surgeon: Dewayne House MD;  [facility]     INSERT INTRAAORTIC BALLOON PUMP Left 02/21/2019    Procedure: SUBCLAVIAN BALLOON PUMP PLACEMENT;  Surgeon: Ben White MD;  [facility]: [facility] OR     INSERT INTRACORONARY STENT      x2     IR PICC PLACEMENT > 5 YRS OF AGE  05/08/2019     TRANSPLANT HEART RECIPIENT N/A 02/24/2019    Procedure: TRANSPLANT HEART RECIPIENT;  Surgeon: Dewayne House MD;  Location: [facility] OR     Immunizations:   Immunization History   Administered Date(s) Administered     COVID-19,PF,Pfizer (12+ Yrs) 03/19/2021, 04/09/2021, 08/21/2021     COVID-19,PF,Pfizer 12+ Yrs (2022 and After) 04/02/2022     Influenza (IIV3) PF 10/22/2008, 09/25/2009, 11/07/2011, 11/30/2012, 10/27/2013, 10/02/2014     Influenza Vaccine IM > 6 months Valent IIV4 (Alfuria,Fluzone) 10/02/2015, 09/20/2019, 09/16/2020, 09/28/2021     Influenza Vaccine, 6+MO IM (QUADRIVALENT W/PRESERVATIVES) 09/12/2016, 10/16/2017, 09/20/2018     OPV, trivalent, live 10/20/1978     Pneumo Conj 13-V (2010&after) 09/26/2018     Pneumococcal 23 valent 09/28/2019     TDAP Vaccine (Boostrix) 07/20/2012     Td (Adult), Adsorbed 10/20/1978       ROS A comprehensive review of systems was performed and was otherwise negative    Medications, allergies, and problem list were reviewed and updated    Exam  /83   Pulse 102   Wt 88.8 kg (195 lb 11.2 oz)   SpO2 97%   BMI 29.76 kg/m    Patient is alert and oriented x3, good mood and affect.  Normal mobility.  No jaundice.  Lungs are clear.  Heart is borderline tachycardia which is baseline for him to transplant heart but no murmur rub or gallop.  There is trace ankle edema bilaterally.  Abdomen is mildly overweight, nontender.    Assessment/Plan  1. Hypothyroidism, unspecified type  Stable dose since last year.  Borderline low TSH but normal T4.  Clinically  euthyroid and I would plan to continue on same dose Synthroid unless significant change in his labs.  - T4 free  - TSH    2. Transplanted heart (H)  Managed by transplant team and was seen in May.  Continue to follow-up with transplant team for immunosuppression management.    Patient was reminded that his yearly skin exam is due this summer, patient stated he would make an appointment.  No new skin lesions identified, however.    3. Chronic renal impairment, stage 3b (H)  Nephrotoxicity from immunosuppression medication.  He had some worsening of creatinine since last year but is now stabilized on checks earlier this spring.  Recheck kidney labs today and follows up with his nephrology clinic in August.    4. Essential hypertension  Mild edema and mild elevated blood pressures, especially while at work.  He reports blood pressures up to 145/90.    Consider a low-dose furosemide 20 mg a day and possibly reduce the losartan dose down to 50 mg.  Could consider reducing amlodipine dose if edema worsened.  I will have nephrology clinic follow-up with his blood pressure management this summer and also be seeing transplant team.    See patient instructions for monitoring blood pressure and return earlier if blood pressure running significantly higher worsening edema.  Otherwise I will see him in 6 months for routine checkup.    5. Coronary artery disease due to calcified coronary lesion  Transplant heart has mild coronary disease.  Now on higher dose rosuvastatin 40 mg.  No evidence of toxicity at this time but I will be checking labs as below.  Low-dose aspirin.    6. Hypercholesterolemia  Now on 40 mg rosuvastatin    7. History of chronic ulcerative colitis  In remission with current immunosuppression.  Colonoscopy in March was negative    8. 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  No symptoms.  Repeat EGD in 3 years recommended    9. Encounter for therapeutic drug monitoring    - CK total  - Comprehensive  metabolic panel    10. Osteoporosis, unspecified osteoporosis type, unspecified pathological fracture presence  No longer on Fosamax.  With his chronic renal insufficiency I would also not have him on Fosamax at this time.  If significant osteoporosis on bone density, refer to osteoporosis clinic to consider treatment options.  Continue regular weightbearing exercise.  - DX Hip/Pelvis/Spine; Future    11. Mild intermittent asthma without complication  No flare on current immunosuppression.  Not needing inhalers.    Chronic sinusitis on Flonase and saline irrigation.    Chronic dysthymia, improved currently.  Continue Cymbalta 20 mg long-term.    No complaints of mild intermittent right hand carpal tunnel symptoms.    We discussed the Shingrix vaccine, but he had shingles a year and a half ago per patient.  But could consider with his immunosuppression in the future.  He did not want to get that today.      Return in about 6 months (around 1/2/2023) for Follow up.   Patient Instructions   No change in medication at this time.    If your blood pressure is trending above 135/85 and/or you are having increasing leg swelling, you could consider adding a diuretic such as furosemide.    Follow-up with your kidney clinic as planned in August for further discussion on your blood pressure management and kidney function.    See me in 6 months for blood pressure and general checkup, but sooner if your blood pressure is running higher.    Continue regular exercise.  Avoid simple carbohydrate and starchy foods in order to avoid further weight gain.    Schedule a DEXA bone density test sometime this summer.  Look on your [employer] computer portal for results.  If you do have significant osteoporosis, you may consider seeing the osteoporosis clinic to discuss osteoporosis treatment options.  Regular weightbearing exercise with walking on a daily basis is recommended.    Fredrick Wolff MD, MD        Current Outpatient Medications    Medication Sig Dispense Refill     amLODIPine (NORVASC) 5 MG tablet TAKE 2 TABLETS BY MOUTH EVERY DAY 60 tablet 11     aspirin (ASA) 81 MG chewable tablet Take 1 tablet (81 mg) by mouth daily       azaTHIOprine (IMURAN) 50 MG tablet Take 2 tablets (100 mg) by mouth daily 180 tablet 3     calcium carbonate 600 mg-vitamin D 400 units (CALTRATE) 600-400 MG-UNIT per tablet Take 1 tablet by mouth 2 times daily (with meals)       cetirizine (ZYRTEC) 10 MG tablet Take 10 mg by mouth daily       DULoxetine (CYMBALTA) 20 MG capsule Take 1 capsule (20 mg) by mouth daily 90 capsule 3     levothyroxine (SYNTHROID/LEVOTHROID) 75 MCG tablet Take 1 tablet (75 mcg) by mouth daily 90 tablet 3     losartan (COZAAR) 25 MG tablet Take 3 tablets (75 mg) by mouth daily (Patient taking differently: Take 73 mg by mouth daily) 270 tablet 3     melatonin 3 MG tablet Take 2 tablets (6 mg) by mouth At Bedtime (Patient taking differently: Take 5 mg by mouth nightly as needed)       multivitamin w/minerals (THERA-VIT-M) tablet Take 1 tablet by mouth daily       omeprazole (PRILOSEC) 40 MG DR capsule Take 1 capsule (40 mg) by mouth daily 90 capsule 3     polyethylene glycol (MIRALAX/GLYCOLAX) powder Take 1 capful by mouth daily       rosuvastatin (CRESTOR) 40 MG tablet Take 1 tablet (40 mg) by mouth daily 90 tablet 3     sirolimus (GENERIC EQUIVALENT) 1 MG tablet Take 4 tablets (4 mg) by mouth daily (Patient taking differently: Take 2 mg by mouth daily) 360 tablet 3     acetaminophen (TYLENOL) 325 MG tablet Take 2 tablets (650 mg) by mouth every 4 hours as needed for mild pain (Patient not taking: Reported on 7/1/2022)       albuterol (PROAIR HFA/PROVENTIL HFA/VENTOLIN HFA) 108 (90 Base) MCG/ACT inhaler Inhale 2 puffs into the lungs every 6 hours as needed for shortness of breath / dyspnea or wheezing (Patient not taking: Reported on 7/1/2022)       fluticasone (FLOVENT HFA) 220 MCG/ACT Inhaler Inhale 2 puffs into the lungs 2 times daily  (Patient not taking: Reported on 2022)       ondansetron (ZOFRAN-ODT) 4 MG ODT tab DISSOLVE 1 TABLET ON THE TONGUE EVERY EIGHT HOURS AS NEEDED (Patient not taking: Reported on 2022)  3     senna (SENOKOT) 8.6 MG tablet Take 2 tablets by mouth 2 times daily as needed for constipation (Patient not taking: Reported on 2022)       Allergies   Allergen Reactions     Hydromorphone Other (See Comments)     Significant Delirium     Adhesive Tape Blisters     Tegaderm causes bruises, blisters and extreme irritation.     Lisinopril Other (See Comments)     hypotension     Quinolones Other (See Comments)     Would not rx quinolones until QTc interval improved.      Tobramycin Other (See Comments)     Would not use aminoglycosides as his kidney function is very borderline     Codeine Nausea     Social History     Tobacco Use     Smoking status: Former Smoker     Packs/day: 1.00     Years: 25.00     Pack years: 25.00     Start date: 1980     Quit date: 2008     Years since quittin.6     Smokeless tobacco: Never Used   Substance Use Topics     Alcohol use: Yes     Alcohol/week: 1.0 - 2.0 standard drink     Types: 1 - 2 Cans of beer per week     Drug use: No             Subjective   Everton is a 57 year old, presenting for the following health issues:  RECHECK      HPI           Review of Systems         Objective    There were no vitals taken for this visit.  There is no height or weight on file to calculate BMI.  Physical Exam                       .  ..  Answers for HPI/ROS submitted by the patient on 2022  If you checked off any problems, how difficult have these problems made it for you to do your work, take care of things at home, or get along with other people?: Not difficult at all  PHQ9 TOTAL SCORE: 2

## 2022-07-01 NOTE — PATIENT INSTRUCTIONS
No change in medication at this time.    If your blood pressure is trending above 135/85 and/or you are having increasing leg swelling, you could consider adding a diuretic such as furosemide.    Follow-up with your kidney clinic as planned in August for further discussion on your blood pressure management and kidney function.    See me in 6 months for blood pressure and general checkup, but sooner if your blood pressure is running higher.    Continue regular exercise.  Avoid simple carbohydrate and starchy foods in order to avoid further weight gain.    Schedule a DEXA bone density test sometime this summer.  Look on your [employer] computer portal for results.  If you do have significant osteoporosis, you may consider seeing the osteoporosis clinic to discuss osteoporosis treatment options.  Regular weightbearing exercise with walking on a daily basis is recommended.   Team c/c attended by OT, SS, NRS, pt, pts spouse Regulo Torres, and Daughter Edgard Alfaro  TEVIN reviewed goals, pt making good progress  OT goal expiration date 10/14 however appears pt will be able to D/c OT sooner  Per Spouse pt and she sponge bathanu ABARCA edu on tub transfer bench, per [address] not working in [employer] therefore will continue sponge bathe  Regulo Torres performs all HM, and cuts grass  She also drives and provides pt w/ breakfast and picks up fast food for meals  Daughter Edgard Alfaro stated that she assists w/ meals PRN  Per Daughter and Spouse, Spouse has been falling but, declines medical attention until "he is better"  Family questioned possibility of pt having gall stone procedure while pt in hospital, SW edu that Dr's  office contacted by pts NRS, appt no sooner on 11/23, pt will D/c prior to this  Per pt appears diarrhea is resolving, per NRS still small amount therefore cont  Contact isolation  Family expressed concerns re: missing prayer shall DON to contact linen service and staff  Daughter Edgard Alfaro feels that pt and Spouse are "fine" when they are at home, they take care of each other"  ABARCA asked if pt is still driving, Edgard Alfaro answered" yes", ABARCA provided edu that per results of [facility] pt should discuss driving with his Dr Marivel Ellison OT JEANNIE Schumacher

## 2022-07-05 ENCOUNTER — TELEPHONE (OUTPATIENT)
Dept: TRANSPLANT | Facility: CLINIC | Age: 57
End: 2022-07-05

## 2022-07-05 NOTE — TELEPHONE ENCOUNTER
General  Route to LPN    Reason for call: Everton spoke with Dermatology and was advised they are booked through November and to contact transplant to see if we can get him in sooner.     Call back needed? Yes  Return Call Needed  Same as documented in contacts section  When to return call?: Same day: Route High Priority

## 2022-07-14 ENCOUNTER — LAB (OUTPATIENT)
Dept: FAMILY MEDICINE | Facility: CLINIC | Age: 57
End: 2022-07-14
Payer: COMMERCIAL

## 2022-07-14 DIAGNOSIS — Z20.822 SUSPECTED COVID-19 VIRUS INFECTION: ICD-10-CM

## 2022-07-14 PROCEDURE — U0005 INFEC AGEN DETEC AMPLI PROBE: HCPCS

## 2022-07-14 PROCEDURE — U0003 INFECTIOUS AGENT DETECTION BY NUCLEIC ACID (DNA OR RNA); SEVERE ACUTE RESPIRATORY SYNDROME CORONAVIRUS 2 (SARS-COV-2) (CORONAVIRUS DISEASE [COVID-19]), AMPLIFIED PROBE TECHNIQUE, MAKING USE OF HIGH THROUGHPUT TECHNOLOGIES AS DESCRIBED BY CMS-2020-01-R: HCPCS

## 2022-07-14 PROCEDURE — 99207 PR NO CHARGE LOS: CPT

## 2022-07-15 ENCOUNTER — TELEPHONE (OUTPATIENT)
Dept: TRANSPLANT | Facility: CLINIC | Age: 57
End: 2022-07-15

## 2022-07-15 DIAGNOSIS — Z94.1 TRANSPLANTED HEART (H): Primary | ICD-10-CM

## 2022-07-15 LAB — SARS-COV-2 RNA RESP QL NAA+PROBE: POSITIVE

## 2022-07-18 ENCOUNTER — DOCUMENTATION ONLY (OUTPATIENT)
Dept: TRANSPLANT | Facility: CLINIC | Age: 57
End: 2022-07-18

## 2022-07-19 ENCOUNTER — TELEPHONE (OUTPATIENT)
Dept: TRANSPLANT | Facility: CLINIC | Age: 57
End: 2022-07-19

## 2022-07-19 NOTE — TELEPHONE ENCOUNTER
Pt tested positive for covid on 7/14. Cough and congestion. No fever. Pt had monoclonal antibodies on 7/16. Letter sent to patient re: missed work. After reviewing with Dr. Donis no need to reduce IMS meds at this time.

## 2022-07-20 DIAGNOSIS — R79.89 LOW TSH LEVEL: ICD-10-CM

## 2022-07-20 DIAGNOSIS — Z92.241 HISTORY OF CORTICOSTEROID THERAPY: ICD-10-CM

## 2022-07-20 DIAGNOSIS — M80.00XD AGE-RELATED OSTEOPOROSIS WITH CURRENT PATHOLOGICAL FRACTURE WITH ROUTINE HEALING, SUBSEQUENT ENCOUNTER: ICD-10-CM

## 2022-07-20 DIAGNOSIS — E03.2 HYPOTHYROIDISM DUE TO MEDICATION: Chronic | ICD-10-CM

## 2022-07-20 RX ORDER — LEVOTHYROXINE SODIUM 75 UG/1
75 TABLET ORAL DAILY
Qty: 90 TABLET | Refills: 2 | Status: SHIPPED | OUTPATIENT
Start: 2022-07-20 | End: 2022-07-21

## 2022-07-20 NOTE — TELEPHONE ENCOUNTER
Prescription approved per [facility] Refill Protocol.  BURTON GloriaN, RN  [facility]

## 2022-07-20 NOTE — TELEPHONE ENCOUNTER
Reason for Call:  Medication or medication refill:    Do you use a [facility]?  Name of the pharmacy and phone number for the current request: [facility] [address] [place] [address]    Name of the medication requested: levothyroxine (SYNTHROID/LEVOTHROID) 75 MCG tablet    Other request: none    Can we leave a detailed message on this number? Not Applicable    Phone number patient can be reached at: Home number on file [phone] (home)    Best Time: any    Call taken on 7/20/2022 at 12:01 PM by Kai Chaudhari

## 2022-07-21 DIAGNOSIS — M80.00XD AGE-RELATED OSTEOPOROSIS WITH CURRENT PATHOLOGICAL FRACTURE WITH ROUTINE HEALING, SUBSEQUENT ENCOUNTER: ICD-10-CM

## 2022-07-21 DIAGNOSIS — E03.2 HYPOTHYROIDISM DUE TO MEDICATION: Chronic | ICD-10-CM

## 2022-07-21 DIAGNOSIS — Z92.241 HISTORY OF CORTICOSTEROID THERAPY: ICD-10-CM

## 2022-07-21 DIAGNOSIS — R79.89 LOW TSH LEVEL: ICD-10-CM

## 2022-07-21 RX ORDER — LEVOTHYROXINE SODIUM 75 UG/1
75 TABLET ORAL DAILY
Qty: 90 TABLET | Refills: 3 | Status: SHIPPED | OUTPATIENT
Start: 2022-07-21 | End: 2023-11-08

## 2022-07-22 NOTE — TELEPHONE ENCOUNTER
"Last Written Prescription Date:  7/20/22  Last Fill Quantity: 90,  # refills: 2   Last office visit provider:  7/1/22     Requested Prescriptions   Pending Prescriptions Disp Refills     levothyroxine (SYNTHROID/LEVOTHROID) 75 MCG tablet 90 tablet 2     Sig: Take 1 tablet (75 mcg) by mouth daily       Thyroid Protocol Passed - 7/21/2022  7:58 AM        Passed - Patient is 12 years or older        Passed - Recent (12 mo) or future (30 days) visit within the authorizing provider's specialty     Patient has had an office visit with the authorizing provider or a provider within the authorizing providers department within the previous 12 mos or has a future within next 30 days. See \"Patient Info\" tab in inbasket, or \"Choose Columns\" in Meds & Orders section of the refill encounter.              Passed - Medication is active on med list        Passed - Normal TSH on file in past 12 months     Recent Labs   Lab Test 07/01/22  1054   TSH 0.61                   Arianne Locke 07/21/22 8:36 PM  "

## 2022-09-09 DIAGNOSIS — I10 BENIGN ESSENTIAL HYPERTENSION: ICD-10-CM

## 2022-09-13 RX ORDER — AMLODIPINE BESYLATE 5 MG/1
TABLET ORAL
Qty: 60 TABLET | Refills: 11 | Status: SHIPPED | OUTPATIENT
Start: 2022-09-13 | End: 2023-10-16

## 2022-10-12 ENCOUNTER — ANCILLARY PROCEDURE (OUTPATIENT)
Dept: BONE DENSITY | Facility: CLINIC | Age: 57
End: 2022-10-12
Attending: INTERNAL MEDICINE
Payer: COMMERCIAL

## 2022-10-12 DIAGNOSIS — M81.0 OSTEOPOROSIS, UNSPECIFIED OSTEOPOROSIS TYPE, UNSPECIFIED PATHOLOGICAL FRACTURE PRESENCE: ICD-10-CM

## 2022-10-12 PROCEDURE — 77080 DXA BONE DENSITY AXIAL: CPT | Mod: TC | Performed by: RADIOLOGY

## 2022-10-12 PROCEDURE — 77081 DXA BONE DENSITY APPENDICULR: CPT | Mod: TC | Performed by: RADIOLOGY

## 2022-10-14 ENCOUNTER — OFFICE VISIT (OUTPATIENT)
Dept: OTOLARYNGOLOGY | Facility: CLINIC | Age: 57
End: 2022-10-14
Payer: COMMERCIAL

## 2022-10-14 DIAGNOSIS — R05.3 CHRONIC COUGH: ICD-10-CM

## 2022-10-14 DIAGNOSIS — J32.9 CHRONIC CONGESTION OF PARANASAL SINUS: Primary | ICD-10-CM

## 2022-10-14 DIAGNOSIS — K21.9 LARYNGOPHARYNGEAL REFLUX (LPR): ICD-10-CM

## 2022-10-14 DIAGNOSIS — R09.81 NASAL CONGESTION: ICD-10-CM

## 2022-10-14 DIAGNOSIS — J37.0 CHRONIC LARYNGITIS: ICD-10-CM

## 2022-10-14 PROCEDURE — 31575 DIAGNOSTIC LARYNGOSCOPY: CPT | Performed by: OTOLARYNGOLOGY

## 2022-10-14 PROCEDURE — 99204 OFFICE O/P NEW MOD 45 MIN: CPT | Mod: 25 | Performed by: OTOLARYNGOLOGY

## 2022-10-14 NOTE — PROGRESS NOTES
HPI: This patient is a 56yo F who presents for evaluation of the throat. There has been a globus sensation for quite some time now, accompanied with PND. Does not complain of active sour taste, heartburn, or burping, but is taking reflux medication for known reflux. He also has nasal congestion and rhinorrhea, stating he has used nasal steroids in the past without much effect. He is s/p transplant now. Denies fevers, otalgia, weight loss, odynophagia, dysphagia, hemoptysis, and shortness of breath.     Past medical history, surgical history, social history, family history, medications, and allergies have been reviewed with the patient and are documented above.    Review of Systems: a 10-system review was performed. Pertinent positives are noted in the HPI and on a separate scanned document in the chart.    PHYSICAL EXAMINATION:  GEN: no acute distress, normocephalic  EYES: extraocular movements are intact, pupils are equal and round. Sclera clear.   EARS: auricles are normally formed. The external auditory canals are clear with minimal to no cerumen. Tympanic membranes are intact bilaterally with no signs of infection, effusion, retractions, or perforations.  NOSE: anterior nares are patent. There are no masses or lesions. The septum is non-obstructing. Turbinates are moderately boggy.  OC/OP: clear, dentition is in good repair. The tongue and palate are fully mobile and symmetric. No masses or lesions. Cobblestoning of the posterior pharyngeal wall.  HP/L (scope): nasopharynx, base of tongue, vallecula, epiglottis, and pyriform sinuses are clear. The bilateral vocal folds are mobile and without lesion. There is interarytenoid pachydermia and some hyperemia of the laryngeal surface of the epiglottis c/w LPR.  NECK: soft and supple. No lymphadenopathy or masses. Airway is midline.  NEURO: CN VII and XII symmetric. alert and oriented. No spontaneous nystagmus. Gait is normal.  PULM: breathing comfortably on room air,  normal chest expansion with respiration. Does cough throughout the encounter and it has a definite chest sound.  CARDS: no cyanosis or clubbing, normal carotid pulses    FLEXIBLE LARYNGOSCOPY: Scope inserted bilaterally to examine nasal tissue, nasopharynx, posterior oropharynx, hypopharynx, and larynx. See exam notes for exam finding details. Patient tolerated the procedure well.    MEDICAL DECISION-MAKING: This patient is a 56yo M with a few issues.  1. Chronic globus sensation/PND from known acid reflux. Discussed diet and lifestyle changes in addition to risks and benefits of continuing his PPI therapy. Can follow-up with PCP or GI for further management moving forward.  2. Chest cough. Advised that he meet with a Pulmonologist to look into this. No ENT source for cough other than the known LPR, which would be more of a throat cough that he doesn't describe. Offered referral, but he is going to utilize the Transplant coordinator to arrange this.  3. Nasal congestion. Appears mostly secondary to AR, but given his post-transplant status, will get a CT sinus to be sure. Will call with results.

## 2022-10-14 NOTE — LETTER
10/14/2022         RE: Everton Larios  [address] [id number]        Dear Colleague,    Thank you for referring your patient, Everton Larios, to the [facility]. Please see a copy of my visit note below.    HPI: This patient is a 58yo F who presents for evaluation of the throat. There has been a globus sensation for quite some time now, accompanied with PND. Does not complain of active sour taste, heartburn, or burping, but is taking reflux medication for known reflux. He also has nasal congestion and rhinorrhea, stating he has used nasal steroids in the past without much effect. He is s/p transplant now. Denies fevers, otalgia, weight loss, odynophagia, dysphagia, hemoptysis, and shortness of breath.     Past medical history, surgical history, social history, family history, medications, and allergies have been reviewed with the patient and are documented above.    Review of Systems: a 10-system review was performed. Pertinent positives are noted in the HPI and on a separate scanned document in the chart.    PHYSICAL EXAMINATION:  GEN: no acute distress, normocephalic  EYES: extraocular movements are intact, pupils are equal and round. Sclera clear.   EARS: auricles are normally formed. The external auditory canals are clear with minimal to no cerumen. Tympanic membranes are intact bilaterally with no signs of infection, effusion, retractions, or perforations.  NOSE: anterior nares are patent. There are no masses or lesions. The septum is non-obstructing. Turbinates are moderately boggy.  OC/OP: clear, dentition is in good repair. The tongue and palate are fully mobile and symmetric. No masses or lesions. Cobblestoning of the posterior pharyngeal wall.  HP/L (scope): nasopharynx, base of tongue, vallecula, epiglottis, and pyriform sinuses are clear. The bilateral vocal folds are mobile and without lesion. There is interarytenoid pachydermia and some hyperemia of the  laryngeal surface of the epiglottis c/w LPR.  NECK: soft and supple. No lymphadenopathy or masses. Airway is midline.  NEURO: CN VII and XII symmetric. alert and oriented. No spontaneous nystagmus. Gait is normal.  PULM: breathing comfortably on room air, normal chest expansion with respiration. Does cough throughout the encounter and it has a definite chest sound.  CARDS: no cyanosis or clubbing, normal carotid pulses    FLEXIBLE LARYNGOSCOPY: Scope inserted bilaterally to examine nasal tissue, nasopharynx, posterior oropharynx, hypopharynx, and larynx. See exam notes for exam finding details. Patient tolerated the procedure well.    MEDICAL DECISION-MAKING: This patient is a 58yo M with a few issues.  1. Chronic globus sensation/PND from known acid reflux. Discussed diet and lifestyle changes in addition to risks and benefits of continuing his PPI therapy. Can follow-up with PCP or GI for further management moving forward.  2. Chest cough. Advised that he meet with a Pulmonologist to look into this. No ENT source for cough other than the known LPR, which would be more of a throat cough that he doesn't describe. Offered referral, but he is going to utilize the Transplant coordinator to arrange this.  3. Nasal congestion. Appears mostly secondary to AR, but given his post-transplant status, will get a CT sinus to be sure. Will call with results.        Again, thank you for allowing me to participate in the care of your patient.        Sincerely,        Jory De Oliveira MD

## 2022-10-17 ENCOUNTER — TELEPHONE (OUTPATIENT)
Dept: TRANSPLANT | Facility: CLINIC | Age: 57
End: 2022-10-17

## 2022-10-17 DIAGNOSIS — Z13.220 LIPID SCREENING: ICD-10-CM

## 2022-10-17 DIAGNOSIS — Z94.1 TRANSPLANTED HEART (H): Primary | ICD-10-CM

## 2022-10-17 NOTE — TELEPHONE ENCOUNTER
Pt called inquiring about pulm referral his PMD suggested. No referral noted in chart. CT ordered. Writer suggested having that done first then we'll ask for the pulm referral. Pt also do for derm, but wants to wait to see how the above goes so he can possibly schedule all in one day.

## 2022-10-19 ENCOUNTER — TELEPHONE (OUTPATIENT)
Dept: GASTROENTEROLOGY | Facility: CLINIC | Age: 57
End: 2022-10-19

## 2022-10-19 DIAGNOSIS — R11.0 NAUSEA: ICD-10-CM

## 2022-10-19 DIAGNOSIS — K21.9 GASTROESOPHAGEAL REFLUX DISEASE WITHOUT ESOPHAGITIS: Primary | ICD-10-CM

## 2022-10-19 RX ORDER — OMEPRAZOLE 40 MG/1
40 CAPSULE, DELAYED RELEASE ORAL
Qty: 90 CAPSULE | Refills: 3 | Status: SHIPPED | OUTPATIENT
Start: 2022-10-19 | End: 2022-12-09

## 2022-10-19 NOTE — TELEPHONE ENCOUNTER
Health Call Center    Phone Message    May a detailed message be left on voicemail: yes     Reason for Call: Symptoms or Concerns     Current symptom or concern: Patient expressed that they have been having issues with acid reflux. He stated that he has been taking omeprazole but it does not seem to be helping.      Has patient previously been seen for this? Yes    By  per patient    Date: 08/31/2021    Are there any new or worsening symptoms? Yes: Acid reflux      Action Taken: Message routed to:  [facility]    Travel Screening: Not Applicable

## 2022-10-19 NOTE — TELEPHONE ENCOUNTER
Called patient to schedule a follow-up appointment for his IBD in the GI clinic. Next available with Dr. Dang is 11/22. No answer; left voicemail with request for return call. Scheduled patient for the appointment and will reschedule if patient confirms he cannot make it.

## 2022-10-19 NOTE — TELEPHONE ENCOUNTER
Called patient.    Has refluxes for years got worsen for 2-3 weeks.    Is taking omeprazole 40 mg daily, once a day since 2005, not taking correctly. He was recently seen by ENT last week who does think he has worsening refluxes.     EGD recently 3/2022 without esophagitis, with small Pierce's (1 cm).     Recommendation  -- increase omeprazole to 40 mg BID, taking 30-60 mg before meal (ordered this encounter)  - tums as needed  - Elevated the bed with books 4-6 inches, no meal before bed for 2-3 hours, no carbonated diet.    - Patient will also need a clinic appointment at least once a year for his IBD, will notify our clinic coordinator.    .Laurence Pak MD  Gastroenterology/Hepatology Fellow  Page: [phone]

## 2022-10-23 ENCOUNTER — HEALTH MAINTENANCE LETTER (OUTPATIENT)
Age: 57
End: 2022-10-23

## 2022-10-26 ENCOUNTER — ANCILLARY PROCEDURE (OUTPATIENT)
Dept: GENERAL RADIOLOGY | Facility: CLINIC | Age: 57
End: 2022-10-26
Attending: NURSE PRACTITIONER
Payer: COMMERCIAL

## 2022-10-26 ENCOUNTER — TELEPHONE (OUTPATIENT)
Dept: FAMILY MEDICINE | Facility: OTHER | Age: 57
End: 2022-10-26

## 2022-10-26 ENCOUNTER — OFFICE VISIT (OUTPATIENT)
Dept: INTERNAL MEDICINE | Facility: CLINIC | Age: 57
End: 2022-10-26
Payer: COMMERCIAL

## 2022-10-26 VITALS
TEMPERATURE: 98.1 F | RESPIRATION RATE: 20 BRPM | OXYGEN SATURATION: 99 % | WEIGHT: 188 LBS | BODY MASS INDEX: 28.59 KG/M2 | HEART RATE: 94 BPM | SYSTOLIC BLOOD PRESSURE: 122 MMHG | DIASTOLIC BLOOD PRESSURE: 80 MMHG

## 2022-10-26 DIAGNOSIS — J45.20 INTERMITTENT ASTHMA WITHOUT COMPLICATION, UNSPECIFIED ASTHMA SEVERITY: ICD-10-CM

## 2022-10-26 DIAGNOSIS — R05.2 SUBACUTE COUGH: ICD-10-CM

## 2022-10-26 DIAGNOSIS — R93.89 ABNORMAL CHEST X-RAY: Primary | ICD-10-CM

## 2022-10-26 DIAGNOSIS — R05.2 SUBACUTE COUGH: Primary | ICD-10-CM

## 2022-10-26 PROCEDURE — 99214 OFFICE O/P EST MOD 30 MIN: CPT | Performed by: NURSE PRACTITIONER

## 2022-10-26 PROCEDURE — 71046 X-RAY EXAM CHEST 2 VIEWS: CPT | Mod: TC | Performed by: RADIOLOGY

## 2022-10-26 RX ORDER — ALBUTEROL SULFATE 90 UG/1
2 AEROSOL, METERED RESPIRATORY (INHALATION) EVERY 6 HOURS PRN
Qty: 18 G | Refills: 0 | Status: SHIPPED | OUTPATIENT
Start: 2022-10-26 | End: 2022-11-18

## 2022-10-26 RX ORDER — FLUTICASONE PROPIONATE 220 UG/1
2 AEROSOL, METERED RESPIRATORY (INHALATION) 2 TIMES DAILY
Qty: 12 G | Refills: 0 | Status: SHIPPED | OUTPATIENT
Start: 2022-10-26 | End: 2022-11-24

## 2022-10-26 RX ORDER — DOXYCYCLINE HYCLATE 100 MG
100 TABLET ORAL 2 TIMES DAILY
Qty: 20 TABLET | Refills: 0 | Status: SHIPPED | OUTPATIENT
Start: 2022-10-26 | End: 2022-11-23

## 2022-10-26 NOTE — PROGRESS NOTES
"  Assessment & Plan     Cough  Cough with chest tightness and wheezing x6 weeks.  Unresponsive to increase in omeprazole.  Remote history of asthma.    Doxycycline to cover for infectious etiology.  Restart Flovent.  Chest x-ray.  See patient structures below for plan of care    - fluticasone (FLOVENT HFA) 220 MCG/ACT inhaler; Inhale 2 puffs into the lungs 2 times daily  - XR Chest 2 Views; Future  - doxycycline hyclate (VIBRA-TABS) 100 MG tablet; Take 1 tablet (100 mg) by mouth 2 times daily    Patient Instructions   Doxycycline antibiotic 1 tablet twice daily for 10 days.    Restart Flovent inhaler 2 puffs twice daily for the next month.    Albuterol inhaler 2 puffs up to 3 times daily as needed for chest tightness/wheezing.    Chest x-ray today to look for infectious cause of cough.    Follow-up with Dr. Whitmore in November      Ordering of each unique test  Prescription drug management  18 minutes spent on the date of the encounter doing chart review, history and exam, documentation and further activities per the note     BMI:   Estimated body mass index is 28.59 kg/m  as calculated from the following:    Height as of 5/10/22: 1.727 m (5' 8\").    Weight as of this encounter: 85.3 kg (188 lb).       Return in about 1 month (around 11/26/2022).    Rosalino Greer, [facility]    You Toscano is a 57 year old, presenting for the following health issues:  Nasal Congestion    Nasal congestion and cough x1 month without much improvement in symptoms.  Wheezing.  Chest congestion.  No fevers.  Remote history of asthma.  History of heart transplant but weight has been stable.  No chest pain.    GI instructed him to increase his omeprazole.  He has a CT scan of the sinuses coming up for sinus evaluation    History of Present Illness       Reason for visit:  Feeling sick  Symptom onset:  1-2 weeks ago  Symptoms include:  Nasal congestion, cough, not feeling well  Symptom " intensity:  Moderate  Symptom progression:  Staying the same  Had these symptoms before:  No  What makes it worse:  Exertion  What makes it better:  Nothing          Review of Systems   Constitutional, HEENT, cardiovascular, pulmonary, gi and gu systems are negative, except as otherwise noted.      Objective    /80 (BP Location: Right arm, Patient Position: Sitting, Cuff Size: Adult Large)   Pulse 94   Temp 98.1  F (36.7  C) (Oral)   Resp 20   Wt 85.3 kg (188 lb)   SpO2 99%   BMI 28.59 kg/m    Body mass index is 28.59 kg/m .  Physical Exam     Mild wheezing and crackles in bilateral lung fields

## 2022-10-26 NOTE — PATIENT INSTRUCTIONS
Doxycycline antibiotic 1 tablet twice daily for 10 days.    Restart Flovent inhaler 2 puffs twice daily for the next month.    Albuterol inhaler 2 puffs up to 3 times daily as needed for chest tightness/wheezing.    Chest x-ray today to look for infectious cause of cough.    Follow-up with Dr. Whitmore in November

## 2022-10-28 ENCOUNTER — HOSPITAL ENCOUNTER (OUTPATIENT)
Dept: CT IMAGING | Facility: HOSPITAL | Age: 57
Discharge: HOME | End: 2022-10-28
Attending: NURSE PRACTITIONER
Payer: COMMERCIAL

## 2022-10-28 ENCOUNTER — HOSPITAL ENCOUNTER (OUTPATIENT)
Dept: CT IMAGING | Facility: HOSPITAL | Age: 57
Discharge: HOME | End: 2022-10-28
Attending: OTOLARYNGOLOGY
Payer: COMMERCIAL

## 2022-10-28 DIAGNOSIS — J32.9 CHRONIC CONGESTION OF PARANASAL SINUS: ICD-10-CM

## 2022-10-28 DIAGNOSIS — R93.89 ABNORMAL CHEST X-RAY: ICD-10-CM

## 2022-10-28 PROCEDURE — 71250 CT THORAX DX C-: CPT

## 2022-10-28 PROCEDURE — 70486 CT MAXILLOFACIAL W/O DYE: CPT

## 2022-11-01 ENCOUNTER — TELEPHONE (OUTPATIENT)
Dept: OTOLARYNGOLOGY | Facility: CLINIC | Age: 57
End: 2022-11-01

## 2022-11-01 NOTE — TELEPHONE ENCOUNTER
Left a message for Everton regarding the availability of his CT sinus results. These show some chronic anterior outflow mucosal thickening and a significant septal spur to the right.

## 2022-11-17 DIAGNOSIS — J45.20 INTERMITTENT ASTHMA WITHOUT COMPLICATION, UNSPECIFIED ASTHMA SEVERITY: ICD-10-CM

## 2022-11-17 NOTE — PROGRESS NOTES
IBD CLINIC VISIT     CC/REFERRING MD:  Elin Jensen  REASON FOR CONSULTATION: Follow-up UC Pancolitis     IBD HISTORY  IBD type: UC pancolitis  Age at diagnosis: 2005, colonoscope with biopsy at [facility]  Endoscopic extent of disease: Quiescent disease on last colonoscopy ()  Histologic: Quiescent on last colonoscopy ()  Current IBD medications: none, on azathioprine and tacrolimus for heart transplant  Prior IBD surgeries: None  Prior IBD Medications:  - Steroids (many years ago)  - Balsalazide ( - 2019)  - Asacol mesalamine (2019 - mid , self stopped)     DRUG MONITORING  TPMT enzyme activity:  N/A   6-TGN/6-MMPN levels: N/A   Biologic concentration: N/A     DISEASE ASSESSMENT  Fecal calprotectin: 951 (2019)  Endoscopy: 2020 showed mucosa crypt distortion with eosinophil in lamina propria, suggested mycophenolate injury.   Enterography: No recent  C diff: not done.     Yadav score: 0 (partial)  Stool freq: 0 (baseline stools frequency)  Rectal bleedin (None)  PGA: 0 (normal)  Endoscopy: Not done     ASSESSMENT/PLAN:  57 year old male with a history of ASD (s/p repair in childhood), NICM 2/p OHT (19) on azathioprine & sirolimus, ulcerative pancolitis, Luisito's esophagus who is here for follow-up of his UC.     # Ulcerative pancolitis  Previoulsy quiescent since 2017 on 5-ASA monotherapy. Flare triggered by C. diff and CMV viremia in  after heart transplant. Self stopped mesalamine mid  without flare. Likely UC is controlled with heart transplant immunosuppressant.   - Will need colonoscope every 2 years for colon cancer surveillance (3/2024).    # GERD      # Short-segment Luisito's Esophagus w/o dysplasia  Last EGD 3/2022 C1M1. Plan for repeat EGD in 3 years (3/2025).   - Omeprazole life long, decrease omeprazole back to 40 mg daily, Patient is instructed to correctly taking the PPI.       # Liver mass, likely hemangioma  Found incidentally on MRI  with suggestion to  follow-up in 6 months. MRI liver again ordered (not done last visit).      RECOMMENDATIONS:  -- Continue omeprazole 40 mg can decrease it back to 40 mg once a day. Please ensure taking 30-60 minutes prior to meal.  -- Elevate head of your bed by 30 degree (with book of 6 inches under your mattress)  -- Ulcerative colitis will need colonoscopy in 2 years (3/2024), and Pierce's screening in 3 years  -- MRI liver to follow-up liver nodule     Return to clinic in 12 months.  Pt care plan discussed with Dr. Allen, GI staff physician.     Laurence Pak MD  GI Fellow  [phone]    HPI:   58 yo M with heart transplant on immunosuppression, here for UC and Pierce's with GERD follow-up.    Last month had worsening acid reflux. Burning in his throat. Taking tums a few times a day, which helps temporary. Called by our clinic and increased his omeprazole to 40-20-20 mg a day. Is taking it sometime with meals. Had cough and was diagnosed with pneumonia and was treated with doxycyline. Still coughing.    No issue with bowel movement, once a day. No blood in stool.  Switched from tacrolimus to azathioprine and continue sirolimus 3/2022.     ROS:    No fevers or chills  No weight loss  No blurry vision, double vision or change in vision  No sore throat  No lymphadenopathy  No headache, paraesthesias, or weakness in a limb  No shortness of breath or wheezing  No chest pain or pressure  No arthralgias or myalgias  No rashes or skin changes  No odynophagia or dysphagia  No BRBPR, hematochezia, melena  No dysuria, frequency or urgency  No hot/cold intolerance or polyria  No anxiety or depression    Extra intestinal manifestations of IBD:  No uveitis/episcleritis  No aphthous ulcers   No arthritis   No erythema nodosum/pyoderma gangrenosum.     PERTINENT PAST MEDICAL HISTORY:  Past Medical History:   Diagnosis Date     Alcohol abuse      Arthritis     hands, neck     ASD (atrial septal defect)     s/p repair age 3     Asthma       Atrial fibrillation (H)      Pierce's esophagus 10/4/2018     Pierce's esophagus      Cataract      CHF (congestive heart failure) (H)      Chronic rhinitis 10/4/2018     Chronic systolic heart failure (H) 10/4/2018     Clotting disorder (H)      Congenital cardiomyopathy in  (H)      Depression 10/4/2018     Depression      Diastolic dysfunction      DJD (degenerative joint disease)     neck     DJD (degenerative joint disease) - neck 10/4/2018     H/O congenital atrial septal defect (ASD) repair at age 3 10/4/2018     History of anesthesia complications     nausea     History of transfusion      Hypothyroidism      Hypothyroidism due to medication 10/4/2018     ICD (implantable cardioverter-defibrillator) in place      ICD, [employer] ; gen change 2018 10/4/2018     Intermittent asthma without complication 10/4/2018     Nonischemic cardiomyopathy (H) 10/4/2018     On amiodarone therapy 10/4/2018     Pacemaker      Paroxysmal atrial fibrillation (H) 10/4/2018     S/P ablation of atrial fibrillation 10/4/2018     Systolic heart failure (H)      Ulcerative colitis (H)      Ulcerative colitis (H)      Ventricular tachycardia 10/4/2018     Ventricular tachycardia        PREVIOUS SURGERIES:  Past Surgical History:   Procedure Laterality Date     ABLATION OF DYSRHYTHMIC FOCUS      for A fib     AICD, DUAL CHAMBER       ASD REPAIR  1968     BRONCHOSCOPY (RIGID OR FLEXIBLE), DIAGNOSTIC N/A 2019    Procedure: BRONCHOSCOPY, WITH BAL;  Surgeon: Margot Chiang MD;  Location: [facility]     CARDIAC DEFIBRILLATOR PLACEMENT      x2--last was 2018     COLONOSCOPY N/A 2020    Procedure: COLONOSCOPY, WITH POLYPECTOMY AND BIOPSY;  Surgeon: Ranjeet Cooper MD;  Location:  GI     COLONOSCOPY N/A 2015    Procedure: COLONOSCOPY with biopsy;  Surgeon: Fernie Akers MD;  Location: [facility];  Service:      COLONOSCOPY N/A 2017    Procedure: COLONOSCOPY with  biopsies and polypectomies using snare;  Surgeon: Gael Romero MD;  Location: [facility];  Service:      COLONOSCOPY N/A 3/8/2022    Procedure: COLONOSCOPY, WITH POLYPECTOMY AND BIOPSY;  Surgeon: Paddy Saldivar DO;  [facility]     CV CORONARY ANGIOGRAM N/A 02/12/2020    Procedure: CV CORONARY ANGIOGRAM;  Surgeon: Isiah Mcallister MD;  [facility]     CV CORONARY ANGIOGRAM N/A 03/17/2021    Procedure: CV CORONARY ANGIOGRAM;  Surgeon: Santino Mckeon MD;  [facility]     CV CORONARY ANGIOGRAM N/A 2/23/2022    Procedure: CV CORONARY ANGIOGRAM;  Surgeon: Yves Rodrigues MD;  Location:  [facility]     CV HEART BIOPSY N/A 03/04/2019    Procedure: Heart Biopsy;  Surgeon: Abhijeet Titus MD;  [facility]     CV HEART BIOPSY N/A 03/25/2019    Procedure: Heart Biopsy;  Surgeon: Yves Rodrigues MD;  [facility]     CV HEART BIOPSY N/A 03/28/2019    Procedure: Heart Cath Heart Biopsy;  Surgeon: Yves Rodrigues MD;  [facility]     CV HEART BIOPSY N/A 04/10/2019    Procedure: CV HEART BIOPSY;  Surgeon: Isiah Mcallister MD;  Location:  [facility]     CV HEART BIOPSY N/A 04/24/2019    Procedure: CV HEART BIOPSY;  Surgeon: Isiah Mcallister MD;  [facility]     CV HEART BIOPSY N/A 05/08/2019    Procedure: CV HEART BIOPSY;  Surgeon: Isiah Mcallister MD;  [facility]     CV HEART BIOPSY N/A 06/04/2019    Procedure: CV HEART BIOPSY;  Surgeon: Alton Solomon MD;  Location:  [facility]     CV HEART BIOPSY N/A 05/22/2019    Procedure: CV HEART BIOPSY;  Surgeon: Yves Rodrigues MD;  [facility]     CV HEART BIOPSY N/A 07/17/2019    Procedure: CV HEART BIOPSY;  Surgeon: Isiah Mcallister MD;  Location:  [facility]     CV HEART BIOPSY N/A  08/13/2019    Procedure: CV HEART BIOPSY;  Surgeon: Jackson Patten MD;  Location:  [facility]     CV HEART BIOPSY N/A 10/15/2019    Procedure: CV HEART BIOPSY;  Surgeon: Santino Mckeon MD;  Location:  [facility]     CV HEART BIOPSY N/A 02/12/2020    Procedure: CV HEART BIOPSY;  Surgeon: Isiah Mcallister MD;  Location:  [facility]     CV HEART BIOPSY N/A 05/05/2020    Procedure: CV HEART BIOPSY;  Surgeon: Yves Rodrigues MD;  Location:  [facility]     CV HEART BIOPSY N/A 03/17/2021    Procedure: CV HEART BIOPSY;  Surgeon: Santino Mckeon MD;  Location:  [facility]     CV HEART BIOPSY N/A 5/10/2022    Procedure: Heart Biopsy;  Surgeon: Yves Rodrigues MD;  Location:  [facility]     CV INTRA AORTIC BALLOON N/A 02/18/2019    Procedure: Intra-Aortic Balloon;  Surgeon: Alton Solomon MD;  Location:  [facility]     CV INTRA AORTIC BALLOON N/A 02/20/2019    Procedure: Replace subclavian IABP;  Surgeon: Yves Rodrigues MD;  Location:  [facility]     CV INTRAVASULAR ULTRASOUND N/A 02/12/2020    Procedure: CV INTRAVASCULAR ULTRASOUND;  Surgeon: Isiah Mcallister MD;  Location:  [facility]     CV LEFT HEART CATH N/A 03/19/2019    Procedure: Left Heart Cath;  Surgeon: Yves Rodrigues MD;  Location:  [facility]     CV LEFT HEART CATH N/A 02/12/2020    Procedure: Left Heart Cath;  Surgeon: Isiah Mcallister MD;  Location:  [facility]     CV MYOCARDIAL BIOPSY N/A 03/19/2019    Procedure: RHC/HBx - Femoral access for 3/19 per Nimisha GONZALEZ;  Surgeon: Yves Rodrigues MD;  Location:  [facility]     CV MYOCARDIAL BIOPSY N/A 03/11/2019    Procedure: ADD ON RHC/HBX;  Surgeon: Alton Solomon MD;  [facility]     CV RIGHT HEART CATH MEASUREMENTS RECORDED N/A 03/25/2019    Procedure: Right Heart  Cath;  Surgeon: Yves Rodrigues MD;  Location:  [facility]     CV RIGHT HEART CATH MEASUREMENTS RECORDED N/A 03/28/2019    Procedure: Heart Cath Right Heart Cath;  Surgeon: Yves Rodrigues MD;  Location:  [facility]     CV RIGHT HEART CATH MEASUREMENTS RECORDED N/A 04/24/2019    Procedure: CV RIGHT HEART CATH;  Surgeon: Isiah Mcallister MD;  Location:  [facility]     CV RIGHT HEART CATH MEASUREMENTS RECORDED N/A 06/04/2019    Procedure: CV RIGHT HEART CATH;  Surgeon: Alton Solomon MD;  Location:  [facility]     CV RIGHT HEART CATH MEASUREMENTS RECORDED N/A 05/22/2019    Procedure: CV RIGHT HEART CATH;  Surgeon: Yves Rodrigues MD;  Location:  [facility]     CV RIGHT HEART CATH MEASUREMENTS RECORDED N/A 08/13/2019    Procedure: CV RIGHT HEART CATH;  Surgeon: Jackson Patten MD;  Location:  [facility]     CV RIGHT HEART CATH MEASUREMENTS RECORDED N/A 10/15/2019    Procedure: CV RIGHT HEART CATH;  Surgeon: Santino Mckeon MD;  Location:  [facility]     CV RIGHT HEART CATH MEASUREMENTS RECORDED N/A 02/12/2020    Procedure: CV RIGHT HEART CATH;  Surgeon: Isiah Mcallister MD;  Location:  [facility]     CV RIGHT HEART CATH MEASUREMENTS RECORDED N/A 03/17/2021    Procedure: CV RIGHT HEART CATH;  Surgeon: Santino Mckeon MD;  Location:  [facility]     CV RIGHT HEART CATH MEASUREMENTS RECORDED N/A 2/23/2022    Procedure: CV RIGHT HEART CATH;  Surgeon: Yves Rodrigues MD;  Location:  [facility]     CV RIGHT HEART CATH MEASUREMENTS RECORDED N/A 5/10/2022    Procedure: Right Heart Catheterization;  Surgeon: Yves Rodrigues MD;  Location: [facility] CARDIAC CATH [facility]     ESOPHAGOSCOPY, GASTROSCOPY, DUODENOSCOPY (EGD), COMBINED N/A 12/19/2017    Procedure: ESOPHAGOGASTRODUODENOSCOPY (EGD) with biopsies;  Surgeon: Gael Romero MD;   Location: [facility];  Service:      ESOPHAGOSCOPY, GASTROSCOPY, DUODENOSCOPY (EGD), COMBINED N/A 3/8/2022    Procedure: ESOPHAGOGASTRODUODENOSCOPY, WITH BIOPSY;  Surgeon: Paddy Saldivar DO;  Location: [facility]     H STATISTIC PICC LINE INSERTION >5YR, FAILED Bilateral 10/28/2021    L sided SVC     HAND SURGERY Left      HC REVISE MEDIAN N/CARPAL TUNNEL SURG  09/21/2016    Procedure: OPEN RIGHT CARPAL TUNNEL RELEASE CORTISONE INJECTION RIGHT THUMB;  Surgeon: Duong Santoyo MD;  Location: [facility];  Service: Orthopedics     INCISION AND DRAINAGE CHEST WASHOUT, COMBINED N/A 03/21/2019    Procedure: Incision And Drainage; Evacuation Right Chest Wall Hematoma;  Surgeon: Dewayne House MD;  [facility]     INSERT INTRAAORTIC BALLOON PUMP Left 02/19/2019    Procedure: Insert left  Subclavian Balloon Pump,  Removal Right femoral arterial balloom pump sheath;  Surgeon: eDwayne House MD;  [facility]     INSERT INTRAAORTIC BALLOON PUMP Left 02/21/2019    Procedure: SUBCLAVIAN BALLOON PUMP PLACEMENT;  Surgeon: Ben White MD;  [facility]     INSERT INTRACORONARY STENT      x2     IR PICC PLACEMENT > 5 YRS OF AGE  05/08/2019     TRANSPLANT HEART RECIPIENT N/A 02/24/2019    Procedure: TRANSPLANT HEART RECIPIENT;  Surgeon: Dewayne House MD;  [facility]       PREVIOUS ENDOSCOPY:  Colonoscopy 2017    ALLERGIES:     Allergies   Allergen Reactions     Hydromorphone Other (See Comments)     Significant Delirium     Adhesive Tape Blisters     Tegaderm causes bruises, blisters and extreme irritation.     Lisinopril Other (See Comments)     hypotension     Quinolones Other (See Comments)     Would not rx quinolones until QTc interval improved.      Tobramycin Other (See Comments)     Would not use aminoglycosides as his kidney function is very borderline     Codeine Nausea       PERTINENT MEDICATIONS:    Current Outpatient Medications:      acetaminophen (TYLENOL) 325 MG tablet,  Take 2 tablets (650 mg) by mouth every 4 hours as needed for mild pain, Disp: , Rfl:      albuterol (PROAIR HFA/PROVENTIL HFA/VENTOLIN HFA) 108 (90 Base) MCG/ACT inhaler, Inhale 2 puffs into the lungs every 6 hours as needed for shortness of breath / dyspnea or wheezing, Disp: 18 g, Rfl: 0     amLODIPine (NORVASC) 5 MG tablet, TAKE 2 TABLETS BY MOUTH EVERY DAY, Disp: 60 tablet, Rfl: 11     aspirin (ASA) 81 MG chewable tablet, Take 1 tablet (81 mg) by mouth daily, Disp: , Rfl:      azaTHIOprine (IMURAN) 50 MG tablet, Take 2 tablets (100 mg) by mouth daily, Disp: 180 tablet, Rfl: 3     calcium carbonate 600 mg-vitamin D 400 units (CALTRATE) 600-400 MG-UNIT per tablet, Take 1 tablet by mouth 2 times daily (with meals), Disp: , Rfl:      cetirizine (ZYRTEC) 10 MG tablet, Take 10 mg by mouth daily, Disp: , Rfl:      doxycycline hyclate (VIBRA-TABS) 100 MG tablet, Take 1 tablet (100 mg) by mouth 2 times daily, Disp: 20 tablet, Rfl: 0     DULoxetine (CYMBALTA) 20 MG capsule, Take 1 capsule (20 mg) by mouth daily, Disp: 90 capsule, Rfl: 3     fluticasone (FLOVENT HFA) 220 MCG/ACT inhaler, Inhale 2 puffs into the lungs 2 times daily, Disp: 12 g, Rfl: 0     levothyroxine (SYNTHROID/LEVOTHROID) 75 MCG tablet, Take 1 tablet (75 mcg) by mouth daily, Disp: 90 tablet, Rfl: 3     losartan (COZAAR) 25 MG tablet, Take 3 tablets (75 mg) by mouth daily (Patient taking differently: Take 73 mg by mouth daily), Disp: 270 tablet, Rfl: 3     melatonin 3 MG tablet, Take 2 tablets (6 mg) by mouth At Bedtime (Patient taking differently: Take 5 mg by mouth nightly as needed), Disp: , Rfl:      multivitamin w/minerals (THERA-VIT-M) tablet, Take 1 tablet by mouth daily, Disp: , Rfl:      omeprazole (PRILOSEC) 40 MG DR capsule, Take 1 capsule (40 mg) by mouth 2 times daily (before meals), Disp: 90 capsule, Rfl: 3     ondansetron (ZOFRAN-ODT) 4 MG ODT tab, DISSOLVE 1 TABLET ON THE TONGUE EVERY EIGHT HOURS AS NEEDED (Patient not taking: Reported on  2022), Disp: , Rfl: 3     polyethylene glycol (MIRALAX/GLYCOLAX) powder, Take 1 capful by mouth daily (Patient not taking: Reported on 10/26/2022), Disp: , Rfl:      rosuvastatin (CRESTOR) 40 MG tablet, Take 1 tablet (40 mg) by mouth daily, Disp: 90 tablet, Rfl: 3     senna (SENOKOT) 8.6 MG tablet, Take 2 tablets by mouth 2 times daily as needed for constipation (Patient not taking: Reported on 2022), Disp: , Rfl:      sirolimus (GENERIC EQUIVALENT) 1 MG tablet, Take 4 tablets (4 mg) by mouth daily (Patient taking differently: Take 2 mg by mouth daily), Disp: 360 tablet, Rfl: 3    SOCIAL HISTORY:  Social History     Socioeconomic History     Marital status: Single     Spouse name: Not on file     Number of children: Not on file     Years of education: Not on file     Highest education level: Not on file   Occupational History     Not on file   Tobacco Use     Smoking status: Former     Packs/day: 1.00     Years: 25.00     Pack years: 25.00     Types: Cigarettes     Start date: 1980     Quit date: 2008     Years since quittin.0     Smokeless tobacco: Never   Substance and Sexual Activity     Alcohol use: Yes     Alcohol/week: 1.0 - 2.0 standard drink     Types: 1 - 2 Cans of beer per week     Drug use: No     Sexual activity: Not on file   Other Topics Concern     Not on file   Social History Narrative    Everton is a retired . He lives by himself in a townhouse in [place]. He has no lawn care responsibilities. He will be staying with his folks during his post-hospital early transplant care time. No history of TB exposures.     He works part-time at [employer].     Social Determinants of Health     Financial Resource Strain: Not on file   Food Insecurity: Not on file   Transportation Needs: Not on file   Physical Activity: Not on file   Stress: Not on file   Social Connections: Not on file   Intimate Partner Violence: Not on file   Housing Stability: Not on file       FAMILY  HISTORY:  Family History   Problem Relation Age of Onset     Endometrial Cancer Mother      Osteoporosis Mother      Hypertension Father      Endometrial Cancer Maternal Grandmother      Hip fracture No family hx of      Nephrolithiasis No family hx of        Past/family/social history reviewed and no changes    PHYSICAL EXAMINATION:  Constitutional: aaox3, cooperative, pleasant, not dyspneic/diaphoretic, no acute distress  Vitals reviewed: There were no vitals taken for this visit.  Wt:   Wt Readings from Last 2 Encounters:   10/26/22 85.3 kg (188 lb)   07/01/22 88.8 kg (195 lb 11.2 oz)      Eyes: Sclera anicteric/injected  Ears/nose/mouth/throat: Normal oropharynx without ulcers or exudate, mucus membranes moist, hearing intact  Neck: supple  CV: No edema  Respiratory: Unlabored breathing  Abd: Nondistended, nontender, no peritoneal signs  Skin: warm, perfused, no jaundice  Psych: Normal affect  MSK: Normal gait    PERTINENT STUDIES:  Most recent CBC:  Recent Labs   Lab Test 05/28/22  1015 05/10/22  1104 05/10/22  1102 05/10/22  0937   WBC 4.2  --   --  4.9   HGB 11.1* 10.8*   < > 11.4*   HCT 34.8*  --   --  36.2*     --   --  248    < > = values in this interval not displayed.     Most recent hepatic panel:  Recent Labs   Lab Test 07/01/22  1054 02/23/22  0727   ALT 26 31   AST 26 24     Most recent creatinine:  Recent Labs   Lab Test 07/01/22  1054 05/28/22  1015   CR 2.00* 1.87*     MRCP 12/2019  1.  Normal biliary system.  2.  A 1.5 cm lesion in segment 7 of the liver which demonstrates atypical imaging characteristics with diffuse enhancement on the delayed sequence that most likely represents an atypical hemangioma. Recommend a follow-up magnetic resonance imaging   examination of the liver in 6 months to document stability.  3.  Numerous other benign hepatic cysts and THADs.  4.  Splenomegaly.  5.  A 1.2 cm right adrenal adenoma.  6.  A T12 compression fracture.    Last colonoscopy 3/2022: Vidya Hendrickson,  Yadav 0 all segments.   Last EGD 3/2022 C1M1 without dysplasia on biopsy.

## 2022-11-18 RX ORDER — ALBUTEROL SULFATE 90 UG/1
2 AEROSOL, METERED RESPIRATORY (INHALATION) EVERY 6 HOURS PRN
Qty: 18 G | Refills: 0 | Status: SHIPPED | OUTPATIENT
Start: 2022-11-18 | End: 2022-12-13

## 2022-11-18 NOTE — TELEPHONE ENCOUNTER
"Last Written Prescription Date:  10/26/22  Last Fill Quantity: 18,  # refills: 0   Last office visit provider:  10/26/22     Requested Prescriptions   Pending Prescriptions Disp Refills     albuterol (PROAIR HFA/PROVENTIL HFA/VENTOLIN HFA) 108 (90 Base) MCG/ACT inhaler 18 g 0     Sig: Inhale 2 puffs into the lungs every 6 hours as needed for shortness of breath / dyspnea or wheezing       Asthma Maintenance Inhalers - Anticholinergics Passed - 11/17/2022 10:41 AM        Passed - Patient is age 12 years or older        Passed - Asthma control assessment score within normal limits in last 6 months     Please review ACT score.           Passed - Medication is active on med list        Passed - Recent (6 mo) or future (30 days) visit within the authorizing provider's specialty     Patient had office visit in the last 6 months or has a visit in the next 30 days with authorizing provider or within the authorizing provider's specialty.  See \"Patient Info\" tab in inbasket, or \"Choose Columns\" in Meds & Orders section of the refill encounter.           Short-Acting Beta Agonist Inhalers Protocol  Passed - 11/17/2022 10:41 AM        Passed - Patient is age 12 or older        Passed - Asthma control assessment score within normal limits in last 6 months     Please review ACT score.           Passed - Medication is active on med list        Passed - Recent (6 mo) or future (30 days) visit within the authorizing provider's specialty     Patient had office visit in the last 6 months or has a visit in the next 30 days with authorizing provider or within the authorizing provider's specialty.  See \"Patient Info\" tab in inbasket, or \"Choose Columns\" in Meds & Orders section of the refill encounter.                 Nelida Johnson RN 11/18/22 2:11 PM  "

## 2022-11-20 DIAGNOSIS — Z94.1 TRANSPLANTED HEART (H): ICD-10-CM

## 2022-11-21 RX ORDER — SIROLIMUS 1 MG/1
TABLET, FILM COATED ORAL
Qty: 120 TABLET | Refills: 11 | Status: SHIPPED | OUTPATIENT
Start: 2022-11-21 | End: 2023-09-11

## 2022-11-22 ENCOUNTER — OFFICE VISIT (OUTPATIENT)
Dept: GASTROENTEROLOGY | Facility: CLINIC | Age: 57
End: 2022-11-22
Payer: COMMERCIAL

## 2022-11-22 VITALS
HEIGHT: 68 IN | HEART RATE: 107 BPM | BODY MASS INDEX: 29.55 KG/M2 | DIASTOLIC BLOOD PRESSURE: 86 MMHG | OXYGEN SATURATION: 98 % | WEIGHT: 195 LBS | SYSTOLIC BLOOD PRESSURE: 128 MMHG

## 2022-11-22 DIAGNOSIS — K51.00 ULCERATIVE PANCOLITIS WITHOUT COMPLICATION (H): ICD-10-CM

## 2022-11-22 DIAGNOSIS — R16.0 LIVER MASS: Primary | ICD-10-CM

## 2022-11-22 PROCEDURE — 99215 OFFICE O/P EST HI 40 MIN: CPT | Mod: GC | Performed by: STUDENT IN AN ORGANIZED HEALTH CARE EDUCATION/TRAINING PROGRAM

## 2022-11-22 ASSESSMENT — PAIN SCALES - GENERAL: PAINLEVEL: NO PAIN (0)

## 2022-11-22 NOTE — LETTER
2022         RE: Everton Larios  [address] [facility] [address]        Dear Colleague,    Thank you for referring your patient, Everton Larios, to the [facility] [place]. Please see a copy of my visit note below.    IBD CLINIC VISIT     CC/REFERRING MD:  Elin Jensen  REASON FOR CONSULTATION: Follow-up UC Pancolitis     IBD HISTORY  IBD type: UC pancolitis  Age at diagnosis: , colonoscope with biopsy at [facility]  Endoscopic extent of disease: Quiescent disease on last colonoscopy ()  Histologic: Quiescent on last colonoscopy ()  Current IBD medications: none, on azathioprine and tacrolimus for heart transplant  Prior IBD surgeries: None  Prior IBD Medications:  - Steroids (many years ago)  - Balsalazide ( - 2019)  - Asacol mesalamine (2019 - mid , self stopped)     DRUG MONITORING  TPMT enzyme activity:  N/A   6-TGN/6-MMPN levels: N/A   Biologic concentration: N/A     DISEASE ASSESSMENT  Fecal calprotectin: 951 (2019)  Endoscopy: 2020 showed mucosa crypt distortion with eosinophil in lamina propria, suggested mycophenolate injury.   Enterography: No recent  C diff: not done.     Yadav score: 0 (partial)  Stool freq: 0 (baseline stools frequency)  Rectal bleedin (None)  PGA: 0 (normal)  Endoscopy: Not done     ASSESSMENT/PLAN:  57 year old male with a history of ASD (s/p repair in childhood), NICM 2/p OHT (19) on azathioprine & sirolimus, ulcerative pancolitis, Luisito's esophagus who is here for follow-up of his UC.     # Ulcerative pancolitis  Previoulsy quiescent since 2017 on 5-ASA monotherapy. Flare triggered by C. diff and CMV viremia in  after heart transplant. Self stopped mesalamine mid  without flare. Likely UC is controlled with heart transplant immunosuppressant.   - Will need colonoscope every 2 years for colon cancer surveillance (3/2024).    # GERD      # Short-segment Luisito's Esophagus w/o  dysplasia  Last EGD 3/2022 C1M1. Plan for repeat EGD in 3 years (3/2025).   - Omeprazole life long, decrease omeprazole back to 40 mg daily, Patient is instructed to correctly taking the PPI.       # Liver mass, likely hemangioma  Found incidentally on MRI 2019 with suggestion to follow-up in 6 months. MRI liver again ordered (not done last visit).      RECOMMENDATIONS:  -- Continue omeprazole 40 mg can decrease it back to 40 mg once a day. Please ensure taking 30-60 minutes prior to meal.  -- Elevate head of your bed by 30 degree (with book of 6 inches under your mattress)  -- Ulcerative colitis will need colonoscopy in 2 years (3/2024), and Pierce's screening in 3 years  -- MRI liver to follow-up liver nodule     Return to clinic in 12 months.  Pt care plan discussed with Dr. Allen, GI staff physician.     Laurence Pak MD  GI Fellow  [phone]    HPI:   56 yo M with heart transplant on immunosuppression, here for UC and Pierce's with GERD follow-up.    Last month had worsening acid reflux. Burning in his throat. Taking tums a few times a day, which helps temporary. Called by our clinic and increased his omeprazole to 40-20-20 mg a day. Is taking it sometime with meals. Had cough and was diagnosed with pneumonia and was treated with doxycyline. Still coughing.    No issue with bowel movement, once a day. No blood in stool.  Switched from tacrolimus to azathioprine and continue sirolimus 3/2022.     ROS:    No fevers or chills  No weight loss  No blurry vision, double vision or change in vision  No sore throat  No lymphadenopathy  No headache, paraesthesias, or weakness in a limb  No shortness of breath or wheezing  No chest pain or pressure  No arthralgias or myalgias  No rashes or skin changes  No odynophagia or dysphagia  No BRBPR, hematochezia, melena  No dysuria, frequency or urgency  No hot/cold intolerance or polyria  No anxiety or depression    Extra intestinal manifestations of IBD:  No  uveitis/episcleritis  No aphthous ulcers   No arthritis   No erythema nodosum/pyoderma gangrenosum.     PERTINENT PAST MEDICAL HISTORY:  Past Medical History:   Diagnosis Date     Alcohol abuse      Arthritis     hands, neck     ASD (atrial septal defect)     s/p repair age 3     Asthma      Atrial fibrillation (H)      Pierce's esophagus 10/4/2018     Pierce's esophagus      Cataract      CHF (congestive heart failure) (H)      Chronic rhinitis 10/4/2018     Chronic systolic heart failure (H) 10/4/2018     Clotting disorder (H)      Congenital cardiomyopathy in  (H)      Depression 10/4/2018     Depression      Diastolic dysfunction      DJD (degenerative joint disease)     neck     DJD (degenerative joint disease) - neck 10/4/2018     H/O congenital atrial septal defect (ASD) repair at age 3 10/4/2018     History of anesthesia complications     nausea     History of transfusion      Hypothyroidism      Hypothyroidism due to medication 10/4/2018     ICD (implantable cardioverter-defibrillator) in place      ICD, [employer] ; gen change 2018 10/4/2018     Intermittent asthma without complication 10/4/2018     Nonischemic cardiomyopathy (H) 10/4/2018     On amiodarone therapy 10/4/2018     Pacemaker      Paroxysmal atrial fibrillation (H) 10/4/2018     S/P ablation of atrial fibrillation 10/4/2018     Systolic heart failure (H)      Ulcerative colitis (H)      Ulcerative colitis (H)      Ventricular tachycardia 10/4/2018     Ventricular tachycardia        PREVIOUS SURGERIES:  Past Surgical History:   Procedure Laterality Date     ABLATION OF DYSRHYTHMIC FOCUS      for A fib     AICD, DUAL CHAMBER       ASD REPAIR  1968     BRONCHOSCOPY (RIGID OR FLEXIBLE), DIAGNOSTIC N/A 2019    Procedure: BRONCHOSCOPY, WITH BAL;  Surgeon: Margot Chiang MD;  Location:  GI     CARDIAC DEFIBRILLATOR PLACEMENT      x2--last was 2018     COLONOSCOPY N/A 2020    Procedure:  COLONOSCOPY, WITH POLYPECTOMY AND BIOPSY;  Surgeon: Ranjeet Cooper MD;  Location:  GI     COLONOSCOPY N/A 02/05/2015    Procedure: COLONOSCOPY with biopsy;  Surgeon: Fernie Akers MD;  Location: [facility];  Service:      COLONOSCOPY N/A 12/19/2017    Procedure: COLONOSCOPY with biopsies and polypectomies using snare;  Surgeon: Gael Romero MD;  Location: [facility];  Service:      COLONOSCOPY N/A 3/8/2022    Procedure: COLONOSCOPY, WITH POLYPECTOMY AND BIOPSY;  Surgeon: Paddy Saldivar DO;  Location:  GI     CV CORONARY ANGIOGRAM N/A 02/12/2020    Procedure: CV CORONARY ANGIOGRAM;  Surgeon: Isiah Mcallister MD;  Location:  HEART CARDIAC CATH LAB     CV CORONARY ANGIOGRAM N/A 03/17/2021    Procedure: CV CORONARY ANGIOGRAM;  Surgeon: Santino Mckeon MD;  Location:  HEART CARDIAC CATH LAB     CV CORONARY ANGIOGRAM N/A 2/23/2022    Procedure: CV CORONARY ANGIOGRAM;  Surgeon: Yves Rodrigues MD;  Location:  HEART CARDIAC CATH LAB     CV HEART BIOPSY N/A 03/04/2019    Procedure: Heart Biopsy;  Surgeon: Abhijeet Titus MD;  Location:  HEART CARDIAC CATH LAB     CV HEART BIOPSY N/A 03/25/2019    Procedure: Heart Biopsy;  Surgeon: Yves Rodrigues MD;  Location:  HEART CARDIAC CATH LAB     CV HEART BIOPSY N/A 03/28/2019    Procedure: Heart Cath Heart Biopsy;  Surgeon: Yves Rodrigues MD;  Location:  HEART CARDIAC CATH LAB     CV HEART BIOPSY N/A 04/10/2019    Procedure: CV HEART BIOPSY;  Surgeon: Isiah Mcallister MD;  [facility]     CV HEART BIOPSY N/A 04/24/2019    Procedure: CV HEART BIOPSY;  Surgeon: Isiah Mcallister MD;  Location:  HEART CARDIAC CATH LAB     CV HEART BIOPSY N/A 05/08/2019    Procedure: CV HEART BIOPSY;  Surgeon: Isiah Mcallister MD;  Location:  HEART CARDIAC CATH LAB     CV HEART BIOPSY N/A 06/04/2019    Procedure: CV HEART BIOPSY;  Surgeon: Alton Solomon MD;  Location:  HEART CARDIAC CATH LAB      CV HEART BIOPSY N/A 05/22/2019    Procedure: CV HEART BIOPSY;  Surgeon: Yves Rodrigues MD;  Location: [facility]     CV HEART BIOPSY N/A 07/17/2019    Procedure: CV HEART BIOPSY;  Surgeon: Isiah Mcallister MD;  Location: [facility]     CV HEART BIOPSY N/A 08/13/2019    Procedure: CV HEART BIOPSY;  Surgeon: Jackson Patten MD;  Location: [facility]     CV HEART BIOPSY N/A 10/15/2019    Procedure: CV HEART BIOPSY;  Surgeon: Santino Mckeon MD;  Location: [facility]     CV HEART BIOPSY N/A 02/12/2020    Procedure: CV HEART BIOPSY;  Surgeon: Isiah Mcallister MD;  Location:  HEART CARDIAC CATH LAB     CV HEART BIOPSY N/A 05/05/2020    Procedure: CV HEART BIOPSY;  Surgeon: Yves Rodrigues MD;  Location:  HEART CARDIAC CATH LAB     CV HEART BIOPSY N/A 03/17/2021    Procedure: CV HEART BIOPSY;  Surgeon: Santino Mckeon MD;  Location:  HEART CARDIAC CATH LAB     CV HEART BIOPSY N/A 5/10/2022    Procedure: Heart Biopsy;  Surgeon: Yves Rodrigues MD;  Location:  HEART CARDIAC CATH LAB     CV INTRA AORTIC BALLOON N/A 02/18/2019    Procedure: Intra-Aortic Balloon;  Surgeon: Alton Solomon MD;  Location:  HEART CARDIAC CATH LAB     CV INTRA AORTIC BALLOON N/A 02/20/2019    Procedure: Replace subclavian IABP;  Surgeon: Yves Rodrigues MD;  Location:  HEART CARDIAC CATH LAB     CV INTRAVASULAR ULTRASOUND N/A 02/12/2020    Procedure: CV INTRAVASCULAR ULTRASOUND;  Surgeon: Isiah Mcallister MD;  Location:  HEART CARDIAC CATH LAB     CV LEFT HEART CATH N/A 03/19/2019    Procedure: Left Heart Cath;  Surgeon: Yves Rodrigues MD;  Location:  HEART CARDIAC CATH LAB     CV LEFT HEART CATH N/A 02/12/2020    Procedure: Left Heart Cath;  Surgeon: Isaih Mcallister MD;  Location:  HEART CARDIAC CATH LAB     CV MYOCARDIAL BIOPSY N/A 03/19/2019    Procedure: RHC/HBx - Femoral access for 3/19 per Nimisha GONZALEZ;   Surgeon: Yves Rodrigues MD;  [facility]     CV MYOCARDIAL BIOPSY N/A 03/11/2019    Procedure: ADD ON RHC/HBX;  Surgeon: Alton Solomon MD;  Location: [facility]     CV RIGHT HEART CATH MEASUREMENTS RECORDED N/A 03/25/2019    Procedure: Right Heart Cath;  Surgeon: Yves Rodrigues MD;  Location: [facility]     CV RIGHT HEART CATH MEASUREMENTS RECORDED N/A 03/28/2019    Procedure: Heart Cath Right Heart Cath;  Surgeon: Yves Rodrigues MD;  [facility]     CV RIGHT HEART CATH MEASUREMENTS RECORDED N/A 04/24/2019    Procedure: CV RIGHT HEART CATH;  Surgeon: Isiah Mcallister MD;  Location: [facility]     CV RIGHT HEART CATH MEASUREMENTS RECORDED N/A 06/04/2019    Procedure: CV RIGHT HEART CATH;  Surgeon: Alton Solomon MD;  Location: [facility]     CV RIGHT HEART CATH MEASUREMENTS RECORDED N/A 05/22/2019    Procedure: CV RIGHT HEART CATH;  Surgeon: Yves Rodrigues MD;  [facility]     CV RIGHT HEART CATH MEASUREMENTS RECORDED N/A 08/13/2019    Procedure: CV RIGHT HEART CATH;  Surgeon: Jackson Patten MD;  Location: [facility]     CV RIGHT HEART CATH MEASUREMENTS RECORDED N/A 10/15/2019    Procedure: CV RIGHT HEART CATH;  Surgeon: Santino Mckeon MD;  Location: [facility]     CV RIGHT HEART CATH MEASUREMENTS RECORDED N/A 02/12/2020    Procedure: CV RIGHT HEART CATH;  Surgeon: Isiah Mcallister MD;  Location: [facility]     CV RIGHT HEART CATH MEASUREMENTS RECORDED N/A 03/17/2021    Procedure: CV RIGHT HEART CATH;  Surgeon: Santino Mckeon MD;  Location: [facility]     CV RIGHT HEART CATH MEASUREMENTS RECORDED N/A 2/23/2022    Procedure: CV RIGHT HEART CATH;  Surgeon: Yves Rodrigues MD;  Location: [facility]     CV RIGHT HEART CATH MEASUREMENTS RECORDED N/A  5/10/2022    Procedure: Right Heart Catheterization;  Surgeon: Yves Rodrigues MD;  Location:  HEART CARDIAC CATH LAB     ESOPHAGOSCOPY, GASTROSCOPY, DUODENOSCOPY (EGD), COMBINED N/A 12/19/2017    Procedure: ESOPHAGOGASTRODUODENOSCOPY (EGD) with biopsies;  Surgeon: Gael Romero MD;  Location: [facility];  Service:      ESOPHAGOSCOPY, GASTROSCOPY, DUODENOSCOPY (EGD), COMBINED N/A 3/8/2022    Procedure: ESOPHAGOGASTRODUODENOSCOPY, WITH BIOPSY;  Surgeon: Paddy Saldivar DO;  Location:  GI     H STATISTIC PICC LINE INSERTION >5YR, FAILED Bilateral 10/28/2021    L sided SVC     HAND SURGERY Left      HC REVISE MEDIAN N/CARPAL TUNNEL SURG  09/21/2016    Procedure: OPEN RIGHT CARPAL TUNNEL RELEASE CORTISONE INJECTION RIGHT THUMB;  Surgeon: Duong Santoyo MD;  Location: [facility];  Service: Orthopedics     INCISION AND DRAINAGE CHEST WASHOUT, COMBINED N/A 03/21/2019    Procedure: Incision And Drainage; Evacuation Right Chest Wall Hematoma;  Surgeon: Dewayne House MD;  [facility]     INSERT INTRAAORTIC BALLOON PUMP Left 02/19/2019    Procedure: Insert left  Subclavian Balloon Pump,  Removal Right femoral arterial balloom pump sheath;  Surgeon: Dewayne House MD;  [facility]     INSERT INTRAAORTIC BALLOON PUMP Left 02/21/2019    Procedure: SUBCLAVIAN BALLOON PUMP PLACEMENT;  Surgeon: Ben White MD;  [facility]     INSERT INTRACORONARY STENT      x2     IR PICC PLACEMENT > 5 YRS OF AGE  05/08/2019     TRANSPLANT HEART RECIPIENT N/A 02/24/2019    Procedure: TRANSPLANT HEART RECIPIENT;  Surgeon: Dewayne House MD;  [facility]       PREVIOUS ENDOSCOPY:  Colonoscopy 2017    ALLERGIES:     Allergies   Allergen Reactions     Hydromorphone Other (See Comments)     Significant Delirium     Adhesive Tape Blisters     Tegaderm causes bruises, blisters and extreme irritation.     Lisinopril Other (See Comments)     hypotension     Quinolones Other (See Comments)      Would not rx quinolones until QTc interval improved.      Tobramycin Other (See Comments)     Would not use aminoglycosides as his kidney function is very borderline     Codeine Nausea       PERTINENT MEDICATIONS:    Current Outpatient Medications:      acetaminophen (TYLENOL) 325 MG tablet, Take 2 tablets (650 mg) by mouth every 4 hours as needed for mild pain, Disp: , Rfl:      albuterol (PROAIR HFA/PROVENTIL HFA/VENTOLIN HFA) 108 (90 Base) MCG/ACT inhaler, Inhale 2 puffs into the lungs every 6 hours as needed for shortness of breath / dyspnea or wheezing, Disp: 18 g, Rfl: 0     amLODIPine (NORVASC) 5 MG tablet, TAKE 2 TABLETS BY MOUTH EVERY DAY, Disp: 60 tablet, Rfl: 11     aspirin (ASA) 81 MG chewable tablet, Take 1 tablet (81 mg) by mouth daily, Disp: , Rfl:      azaTHIOprine (IMURAN) 50 MG tablet, Take 2 tablets (100 mg) by mouth daily, Disp: 180 tablet, Rfl: 3     calcium carbonate 600 mg-vitamin D 400 units (CALTRATE) 600-400 MG-UNIT per tablet, Take 1 tablet by mouth 2 times daily (with meals), Disp: , Rfl:      cetirizine (ZYRTEC) 10 MG tablet, Take 10 mg by mouth daily, Disp: , Rfl:      doxycycline hyclate (VIBRA-TABS) 100 MG tablet, Take 1 tablet (100 mg) by mouth 2 times daily, Disp: 20 tablet, Rfl: 0     DULoxetine (CYMBALTA) 20 MG capsule, Take 1 capsule (20 mg) by mouth daily, Disp: 90 capsule, Rfl: 3     fluticasone (FLOVENT HFA) 220 MCG/ACT inhaler, Inhale 2 puffs into the lungs 2 times daily, Disp: 12 g, Rfl: 0     levothyroxine (SYNTHROID/LEVOTHROID) 75 MCG tablet, Take 1 tablet (75 mcg) by mouth daily, Disp: 90 tablet, Rfl: 3     losartan (COZAAR) 25 MG tablet, Take 3 tablets (75 mg) by mouth daily (Patient taking differently: Take 73 mg by mouth daily), Disp: 270 tablet, Rfl: 3     melatonin 3 MG tablet, Take 2 tablets (6 mg) by mouth At Bedtime (Patient taking differently: Take 5 mg by mouth nightly as needed), Disp: , Rfl:      multivitamin w/minerals (THERA-VIT-M) tablet, Take 1 tablet  by mouth daily, Disp: , Rfl:      omeprazole (PRILOSEC) 40 MG DR capsule, Take 1 capsule (40 mg) by mouth 2 times daily (before meals), Disp: 90 capsule, Rfl: 3     ondansetron (ZOFRAN-ODT) 4 MG ODT tab, DISSOLVE 1 TABLET ON THE TONGUE EVERY EIGHT HOURS AS NEEDED (Patient not taking: Reported on 2022), Disp: , Rfl: 3     polyethylene glycol (MIRALAX/GLYCOLAX) powder, Take 1 capful by mouth daily (Patient not taking: Reported on 10/26/2022), Disp: , Rfl:      rosuvastatin (CRESTOR) 40 MG tablet, Take 1 tablet (40 mg) by mouth daily, Disp: 90 tablet, Rfl: 3     senna (SENOKOT) 8.6 MG tablet, Take 2 tablets by mouth 2 times daily as needed for constipation (Patient not taking: Reported on 2022), Disp: , Rfl:      sirolimus (GENERIC EQUIVALENT) 1 MG tablet, Take 4 tablets (4 mg) by mouth daily (Patient taking differently: Take 2 mg by mouth daily), Disp: 360 tablet, Rfl: 3    SOCIAL HISTORY:  Social History     Socioeconomic History     Marital status: Single     Spouse name: Not on file     Number of children: Not on file     Years of education: Not on file     Highest education level: Not on file   Occupational History     Not on file   Tobacco Use     Smoking status: Former     Packs/day: 1.00     Years: 25.00     Pack years: 25.00     Types: Cigarettes     Start date: 1980     Quit date: 2008     Years since quittin.0     Smokeless tobacco: Never   Substance and Sexual Activity     Alcohol use: Yes     Alcohol/week: 1.0 - 2.0 standard drink     Types: 1 - 2 Cans of beer per week     Drug use: No     Sexual activity: Not on file   Other Topics Concern     Not on file   Social History Narrative    Everton is a retired . He lives by himself in a townhouse in [place]. He has no lawn care responsibilities. He will be staying with his folks during his post-hospital early transplant care time. No history of TB exposures.     He works part-time at [employer].     Social Determinants of  Health     Financial Resource Strain: Not on file   Food Insecurity: Not on file   Transportation Needs: Not on file   Physical Activity: Not on file   Stress: Not on file   Social Connections: Not on file   Intimate Partner Violence: Not on file   Housing Stability: Not on file       FAMILY HISTORY:  Family History   Problem Relation Age of Onset     Endometrial Cancer Mother      Osteoporosis Mother      Hypertension Father      Endometrial Cancer Maternal Grandmother      Hip fracture No family hx of      Nephrolithiasis No family hx of        Past/family/social history reviewed and no changes    PHYSICAL EXAMINATION:  Constitutional: aaox3, cooperative, pleasant, not dyspneic/diaphoretic, no acute distress  Vitals reviewed: There were no vitals taken for this visit.  Wt:   Wt Readings from Last 2 Encounters:   10/26/22 85.3 kg (188 lb)   07/01/22 88.8 kg (195 lb 11.2 oz)      Eyes: Sclera anicteric/injected  Ears/nose/mouth/throat: Normal oropharynx without ulcers or exudate, mucus membranes moist, hearing intact  Neck: supple  CV: No edema  Respiratory: Unlabored breathing  Abd: Nondistended, nontender, no peritoneal signs  Skin: warm, perfused, no jaundice  Psych: Normal affect  MSK: Normal gait    PERTINENT STUDIES:  Most recent CBC:  Recent Labs   Lab Test 05/28/22  1015 05/10/22  1104 05/10/22  1102 05/10/22  0937   WBC 4.2  --   --  4.9   HGB 11.1* 10.8*   < > 11.4*   HCT 34.8*  --   --  36.2*     --   --  248    < > = values in this interval not displayed.     Most recent hepatic panel:  Recent Labs   Lab Test 07/01/22  1054 02/23/22  0727   ALT 26 31   AST 26 24     Most recent creatinine:  Recent Labs   Lab Test 07/01/22  1054 05/28/22  1015   CR 2.00* 1.87*     MRCP 12/2019  1.  Normal biliary system.  2.  A 1.5 cm lesion in segment 7 of the liver which demonstrates atypical imaging characteristics with diffuse enhancement on the delayed sequence that most likely represents an atypical  hemangioma. Recommend a follow-up magnetic resonance imaging   examination of the liver in 6 months to document stability.  3.  Numerous other benign hepatic cysts and THADs.  4.  Splenomegaly.  5.  A 1.2 cm right adrenal adenoma.  6.  A T12 compression fracture.    Last colonoscopy 3/2022: Vidya 0, Yadav 0 all segments.   Last EGD 3/2022 C1M1 without dysplasia on biopsy.        Sincerely,    Laurence Pak MD

## 2022-11-22 NOTE — NURSING NOTE
"Chief Complaint   Patient presents with     Follow Up       Vitals:    11/22/22 1532   BP: 128/86   Pulse: 107   SpO2: 98%   Weight: 88.5 kg (195 lb)   Height: 1.727 m (5' 8\")       Body mass index is 29.65 kg/m .    Rut Sánchez MA    "

## 2022-11-22 NOTE — PATIENT INSTRUCTIONS
It was a pleasure taking care of you today.  I've included a brief summary of our discussion and care plan from today's visit below.  Please review this information with your primary care provider.  _______________________________________________________________________    My recommendations are summarized as follows:  -- Continue omeprazole 40 mg can decrease it back to 40 mg once a day. Please ensure taking 30-60 minutes prior to meal.  -- Elevate head of your bed by 30 degree (with book of 6 inches under your mattress)  -- Ulcerative colitis will need colonoscopy in 2 years (3/2024), and Pierce's screening in 3 years  -- MRI liver to follow the prior liver mass seen in 2019      Return to GI Clinic in 12 months    _______________________________________________________________________    Who do I call with any questions after my visit?  Please be in touch if there are any further questions that arise following today's visit.  There are multiple ways to contact your gastroenterology care team.    During business hours, you may reach a Gastroenterology nurse at [phone] and choose option 3.     To schedule or reschedule an appointment, please call [phone].   You can always send a secure message through [employer].  [employer] messages are answered by your nurse or doctor typically within 24 hours.  Please allow extra time on weekends and holidays.    For urgent/emergent questions after business hours, you may reach the on-call GI Fellow by contacting the [facility] at [phone].     How will I get the results of any tests ordered?    You will receive all of your results.  If you have signed up for [employer], any tests ordered at your visit will be available to you after your physician reviews them.  Typically this takes 1-2 weeks.  If there are urgent results that require a change in your care plan, your physician or nurse will call you to discuss the next steps.      What is  [employer]?  [employer] is a secure way for you to access all of your healthcare records from the [facility].  It is a web based computer program, so you can sign on to it from any location.  It also allows you to send secure messages to your care team.  I recommend signing up for [employer] access if you have not already done so and are comfortable with using a computer.      How do I schedule labs, imaging studies, or procedures that were ordered in clinic today?   - Labs: To schedule lab appointment at the [facility], use my chart or call [phone]. If you have a [facility] lab closer to home where you are regularly seen you can give them a call.   - Procedures: If a colonoscopy, upper endoscopy, breath test, esophageal manometry, or pH impedence was ordered today, our endoscopy team will call you to schedule this. If you have not heard from our endoscopy team within a week, please call [phone] to schedule.   - Imaging Studies: If you were scheduled for a CT scan, X-ray, MRI, ultrasound, HIDA scan or other imaging study, please call [phone] to have this scheduled.   - Referral: If a referral to another specialty was ordered, expect a phone call or follow instructions above. If you have not heard from anyone regarding your referral in a week, please call our clinic to check the status.     Sincerely,    Laurence Pak MD  GI Fellow  [facility], Division of Gastroenterology

## 2022-11-23 ENCOUNTER — OFFICE VISIT (OUTPATIENT)
Dept: INTERNAL MEDICINE | Facility: CLINIC | Age: 57
End: 2022-11-23
Payer: COMMERCIAL

## 2022-11-23 ENCOUNTER — OFFICE VISIT (OUTPATIENT)
Dept: OTOLARYNGOLOGY | Facility: CLINIC | Age: 57
End: 2022-11-23
Payer: COMMERCIAL

## 2022-11-23 VITALS
OXYGEN SATURATION: 99 % | WEIGHT: 192 LBS | HEART RATE: 99 BPM | SYSTOLIC BLOOD PRESSURE: 124 MMHG | BODY MASS INDEX: 29.1 KG/M2 | TEMPERATURE: 98.1 F | RESPIRATION RATE: 20 BRPM | HEIGHT: 68 IN | DIASTOLIC BLOOD PRESSURE: 72 MMHG

## 2022-11-23 DIAGNOSIS — J34.3 NASAL TURBINATE HYPERTROPHY: ICD-10-CM

## 2022-11-23 DIAGNOSIS — J34.2 DEVIATED NASAL SEPTUM: ICD-10-CM

## 2022-11-23 DIAGNOSIS — J45.20 MILD INTERMITTENT ASTHMA WITHOUT COMPLICATION: ICD-10-CM

## 2022-11-23 DIAGNOSIS — J32.0 CHRONIC MAXILLARY SINUSITIS: Primary | ICD-10-CM

## 2022-11-23 DIAGNOSIS — R05.2 SUBACUTE COUGH: ICD-10-CM

## 2022-11-23 DIAGNOSIS — R05.3 CHRONIC COUGH: ICD-10-CM

## 2022-11-23 DIAGNOSIS — K22.70 BARRETT'S ESOPHAGUS WITHOUT DYSPLASIA: ICD-10-CM

## 2022-11-23 DIAGNOSIS — Z87.01 HISTORY OF PNEUMONIA: ICD-10-CM

## 2022-11-23 DIAGNOSIS — Z94.1 TRANSPLANTED HEART (H): ICD-10-CM

## 2022-11-23 DIAGNOSIS — J32.2 CHRONIC ETHMOIDITIS: ICD-10-CM

## 2022-11-23 DIAGNOSIS — J45.20 INTERMITTENT ASTHMA WITHOUT COMPLICATION, UNSPECIFIED ASTHMA SEVERITY: ICD-10-CM

## 2022-11-23 PROCEDURE — 99214 OFFICE O/P EST MOD 30 MIN: CPT | Performed by: INTERNAL MEDICINE

## 2022-11-23 PROCEDURE — 99213 OFFICE O/P EST LOW 20 MIN: CPT | Performed by: OTOLARYNGOLOGY

## 2022-11-23 RX ORDER — CYCLOSPORINE 0.5 MG/ML
1 EMULSION OPHTHALMIC 2 TIMES DAILY PRN
COMMUNITY
Start: 2022-11-10

## 2022-11-23 ASSESSMENT — ENCOUNTER SYMPTOMS: COUGH: 1

## 2022-11-23 NOTE — PATIENT INSTRUCTIONS
No change in treatment plan at this time.  Continue the nasal steroid with fluticasone.  Continue the inhaled steroid for asthma, Flovent.  Use albuterol as needed.  Continue with nasal saline lavage.    Follow-up if your cough worsens, or respiratory symptoms/pneumonia symptoms recur.    Otherwise routine follow-up with me in the spring.    Continue the omeprazole daily for reflux.

## 2022-11-23 NOTE — PROGRESS NOTES
Everton Larios   57 year old male    Date of Visit: 11/23/2022    Chief Complaint   Patient presents with     Cough     Follow up from one month ago.  Patient saw matt Ramirez has a littler bit of a cough       Subjective  Patient is following up from a right upper lobe pneumonia from last month.  Patient had significant cough and fatigue.  He was having significant sinusitis symptoms at the time with nasal congestion and postnasal drip.  CT scan of the sinuses showed significant mucosal thickening of multiple sinuses.  He did have ENT evaluation last month, there is no obstructing mass, he does have a deviated septum.    He is currently using the Flonase and a saline irrigation.    He did restart the Flovent for his asthma last month.  Has been taking that twice a day.  Is not using significant albuterol.    He took 10 days of doxycycline and by the end of the 10 days he was significantly better.  Cough is largely resolved although still does continue, more of an irritative cough.  No shortness of breath or chest pain.  No fever, as well the fatigue is resolved.    I did review the chest CT scan from October that did show right upper lobe nodular opacity with groundglass opacification consistent with pneumonia.    Patient is immunosuppressed with cardiac transplant.    Patient does have chronic reflux with Pierce's esophagus but no dysplasia on March 2022 EGD.  He continues on Prilosec and saw GI yesterday, with recommendation reduce the PPI down to once a day instead of twice a day.    Hypertension has been controlled on losartan and amlodipine.    Hypothyroid with normal TSH in July.    No chest pain or chest pressure.  Cardiac MRI February 2022 was negative for ischemia.  He has mild coronary disease in his transplanted heart.  Chronic tachycardia with transplanted heart.    No flare of ulcerative colitis with colonoscopy in March of this year, no polyp but ulcerative colitis noted.    PMHx:    Past  Medical History:   Diagnosis Date     Alcohol abuse      Arthritis     hands, neck     ASD (atrial septal defect)     s/p repair age 3     Asthma      Atrial fibrillation (H)      Pierce's esophagus 10/4/2018     Pierce's esophagus      Cataract      CHF (congestive heart failure) (H)      Chronic rhinitis 10/4/2018     Chronic systolic heart failure (H) 10/4/2018     Clotting disorder (H)      Congenital cardiomyopathy in  (H)      Depression 10/4/2018     Depression      Diastolic dysfunction      DJD (degenerative joint disease)     neck     DJD (degenerative joint disease) - neck 10/4/2018     H/O congenital atrial septal defect (ASD) repair at age 3 10/4/2018     History of anesthesia complications     nausea     History of transfusion      Hypothyroidism      Hypothyroidism due to medication 10/4/2018     ICD (implantable cardioverter-defibrillator) in place      ICD, [employer] ; gen change 2018 10/4/2018     Intermittent asthma without complication 10/4/2018     Nonischemic cardiomyopathy (H) 10/4/2018     On amiodarone therapy 10/4/2018     Pacemaker      Paroxysmal atrial fibrillation (H) 10/4/2018     S/P ablation of atrial fibrillation 10/4/2018     Systolic heart failure (H)      Ulcerative colitis (H)      Ulcerative colitis (H)      Ventricular tachycardia 10/4/2018     Ventricular tachycardia      PSHx:    Past Surgical History:   Procedure Laterality Date     ABLATION OF DYSRHYTHMIC FOCUS      for A fib     AICD, DUAL CHAMBER       ASD REPAIR  1968     BRONCHOSCOPY (RIGID OR FLEXIBLE), DIAGNOSTIC N/A 2019    Procedure: BRONCHOSCOPY, WITH BAL;  Surgeon: Margot Chiang MD;  Location:  GI     CARDIAC DEFIBRILLATOR PLACEMENT      x2--last was 2018     COLONOSCOPY N/A 2020    Procedure: COLONOSCOPY, WITH POLYPECTOMY AND BIOPSY;  Surgeon: Ranjeet Cooper MD;  Location:  GI     COLONOSCOPY N/A 2015    Procedure: COLONOSCOPY with  biopsy;  Surgeon: Fernie Akers MD;  Location: [facility] GI;  Service:      COLONOSCOPY N/A 12/19/2017    Procedure: COLONOSCOPY with biopsies and polypectomies using snare;  Surgeon: Gael Romero MD;  Location: [facility] GI;  Service:      COLONOSCOPY N/A 3/8/2022    Procedure: COLONOSCOPY, WITH POLYPECTOMY AND BIOPSY;  Surgeon: Paddy Saldivar DO;  [facility]     CV CORONARY ANGIOGRAM N/A 02/12/2020    Procedure: CV CORONARY ANGIOGRAM;  Surgeon: Isiah Mcallister MD;  [facility]     CV CORONARY ANGIOGRAM N/A 03/17/2021    Procedure: CV CORONARY ANGIOGRAM;  Surgeon: Santino Mckeon MD;  [facility]     CV CORONARY ANGIOGRAM N/A 2/23/2022    Procedure: CV CORONARY ANGIOGRAM;  Surgeon: Yves Rodrigues MD;  [facility]     CV HEART BIOPSY N/A 03/04/2019    Procedure: Heart Biopsy;  Surgeon: Abhijeet Titus MD;  [facility]     CV HEART BIOPSY N/A 03/25/2019    Procedure: Heart Biopsy;  Surgeon: Yves Rodrigues MD;  [facility]     CV HEART BIOPSY N/A 03/28/2019    Procedure: Heart Cath Heart Biopsy;  Surgeon: Yves Rodrigues MD;  [facility]     CV HEART BIOPSY N/A 04/10/2019    Procedure: CV HEART BIOPSY;  Surgeon: Isiah Mcallister MD;  [facility]     CV HEART BIOPSY N/A 04/24/2019    Procedure: CV HEART BIOPSY;  Surgeon: Isiah Mcallister MD;  [facility]     CV HEART BIOPSY N/A 05/08/2019    Procedure: CV HEART BIOPSY;  Surgeon: Isiah Mcallister MD;  [facility]     CV HEART BIOPSY N/A 06/04/2019    Procedure: CV HEART BIOPSY;  Surgeon: Alton Solomon MD;  [facility]     CV HEART BIOPSY N/A 05/22/2019    Procedure: CV HEART BIOPSY;  Surgeon: Yves Rodrigues MD;  [facility]     CV HEART BIOPSY N/A  07/17/2019    Procedure: CV HEART BIOPSY;  Surgeon: Isiah Mcallister MD;  Location:  HEART CARDIAC CATH LAB     CV HEART BIOPSY N/A 08/13/2019    Procedure: CV HEART BIOPSY;  Surgeon: Jackson Patten MD;  [facility]     CV HEART BIOPSY N/A 10/15/2019    Procedure: CV HEART BIOPSY;  Surgeon: Santino Mckeon MD;  [facility]     CV HEART BIOPSY N/A 02/12/2020    Procedure: CV HEART BIOPSY;  Surgeon: Isiah Mcallister MD;  Location:  HEART CARDIAC CATH LAB     CV HEART BIOPSY N/A 05/05/2020    Procedure: CV HEART BIOPSY;  Surgeon: Yves Rodrigues MD;  Location:  HEART CARDIAC CATH LAB     CV HEART BIOPSY N/A 03/17/2021    Procedure: CV HEART BIOPSY;  Surgeon: Santino Mckeon MD;  Location:  HEART CARDIAC CATH LAB     CV HEART BIOPSY N/A 5/10/2022    Procedure: Heart Biopsy;  Surgeon: Yves Rodrigues MD;  Location:  HEART CARDIAC CATH LAB     CV INTRA AORTIC BALLOON N/A 02/18/2019    Procedure: Intra-Aortic Balloon;  Surgeon: Alton Solomon MD;  Location:  HEART CARDIAC CATH LAB     CV INTRA AORTIC BALLOON N/A 02/20/2019    Procedure: Replace subclavian IABP;  Surgeon: Yves Rodrigues MD;  Location:  HEART CARDIAC CATH LAB     CV INTRAVASULAR ULTRASOUND N/A 02/12/2020    Procedure: CV INTRAVASCULAR ULTRASOUND;  Surgeon: Isiah Mcallister MD;  Location:  HEART CARDIAC CATH LAB     CV LEFT HEART CATH N/A 03/19/2019    Procedure: Left Heart Cath;  Surgeon: Yves Rodrigues MD;  Location:  [facility]ARDI[facility] CATH LAB     CV LEFT HEART CATH N/A 02/12/2020    Procedure: Left Heart Cath;  Surgeon: Isiah Mcallister MD;  Location:  HE[facility]ARDI[facility] CATH LAB     CV MYOCARDIAL BIOPSY N/A 03/19/2019    Procedure: RHC/HBx - Femoral access for 3/19 per Nimisha GONZALEZ;  Surgeon: Yves Rodrigues MD;  Location:  [facility]ARDIAC CATH LAB     CV MYOCARDIAL BIOPSY N/A 03/11/2019    Procedure: ADD ON RHC/HBX;  Surgeon: Juanito  MD Alton;  [facility]     CV RIGHT HEART CATH MEASUREMENTS RECORDED N/A 03/25/2019    Procedure: Right Heart Cath;  Surgeon: Yves Rodrigues MD;  [facility]     CV RIGHT HEART CATH MEASUREMENTS RECORDED N/A 03/28/2019    Procedure: Heart Cath Right Heart Cath;  Surgeon: Yves Rodrigues MD;  [facility]     CV RIGHT HEART CATH MEASUREMENTS RECORDED N/A 04/24/2019    Procedure: CV RIGHT HEART CATH;  Surgeon: Isiah Mcallister MD;  [facility]     CV RIGHT HEART CATH MEASUREMENTS RECORDED N/A 06/04/2019    Procedure: CV RIGHT HEART CATH;  Surgeon: Alton Solomon MD;  Location:  HEART CARDIAC CATH LAB     CV RIGHT HEART CATH MEASUREMENTS RECORDED N/A 05/22/2019    Procedure: CV RIGHT HEART CATH;  Surgeon: Yves Rodrigues MD;  [facility]     CV RIGHT HEART CATH MEASUREMENTS RECORDED N/A 08/13/2019    Procedure: CV RIGHT HEART CATH;  Surgeon: Jackson Patten MD;  [facility]     CV RIGHT HEART CATH MEASUREMENTS RECORDED N/A 10/15/2019    Procedure: CV RIGHT HEART CATH;  Surgeon: Santino Mckeon MD;  Location:  HEART CARDIAC CATH LAB     CV RIGHT HEART CATH MEASUREMENTS RECORDED N/A 02/12/2020    Procedure: CV RIGHT HEART CATH;  Surgeon: Isiah Mcallister MD;  Location:  HEART CARDIAC CATH LAB     CV RIGHT HEART CATH MEASUREMENTS RECORDED N/A 03/17/2021    Procedure: CV RIGHT HEART CATH;  Surgeon: Santino Mckeon MD;  Location:  HEART CARDI[facility] CATH LAB     CV RIGHT HEART CATH MEASUREMENTS RECORDED N/A 2/23/2022    Procedure: CV RIGHT HEART CATH;  Surgeon: Yves Rodrigues MD;  Location:  HEART CARDI[facility] CATH LAB     CV RIGHT HEART CATH MEASUREMENTS RECORDED N/A 5/10/2022    Procedure: Right Heart Catheterization;  Surgeon: Yves Rodrigues MD;  Location:  HEART CARDIAC CATH LAB     ESOPHAGOSCOPY, GASTROSCOPY,  DUODENOSCOPY (EGD), COMBINED N/A 12/19/2017    Procedure: ESOPHAGOGASTRODUODENOSCOPY (EGD) with biopsies;  Surgeon: Gael Romero MD;  Location: [facility];  Service:      ESOPHAGOSCOPY, GASTROSCOPY, DUODENOSCOPY (EGD), COMBINED N/A 3/8/2022    Procedure: ESOPHAGOGASTRODUODENOSCOPY, WITH BIOPSY;  Surgeon: Paddy Saldivar DO;  [facility]     H STATISTIC PICC LINE INSERTION >5YR, FAILED Bilateral 10/28/2021    L sided SVC     HAND SURGERY Left      HC REVISE MEDIAN N/CARPAL TUNNEL SURG  09/21/2016    Procedure: OPEN RIGHT CARPAL TUNNEL RELEASE CORTISONE INJECTION RIGHT THUMB;  Surgeon: Duong Santoyo MD;  Location[facility] OR;  Service: Orthopedics     INCISION AND DRAINAGE CHEST WASHOUT, COMBINED N/A 03/21/2019    Procedure: Incision And Drainage; Evacuation Right Chest Wall Hematoma;  Surgeon: Dewayne House MD;  [facility]     INSERT INTRAAORTIC BALLOON PUMP Left 02/19/2019    Procedure: Insert left  Subclavian Balloon Pump,  Removal Right femoral arterial balloom pump sheath;  Surgeon: Dewayne House MD;  [facility]     INSERT INTRAAORTIC BALLOON PUMP Left 02/21/2019    Procedure: SUBCLAVIAN BALLOON PUMP PLACEMENT;  Surgeon: Ben White MD;  [facility]     INSERT INTRACORONARY STENT      x2     IR PICC PLACEMENT > 5 YRS OF AGE  05/08/2019     TRANSPLANT HEART RECIPIENT N/A 02/24/2019    Procedure: TRANSPLANT HEART RECIPIENT;  Surgeon: Dewayne House MD;  [facility]     Immunizations:   Immunization History   Administered Date(s) Administered     COVID-19 Vaccine 12+ (Pfizer 2022) 04/02/2022     COVID-19 Vaccine 12+ (Pfizer) 03/19/2021, 04/09/2021, 08/21/2021     COVID-19 Vaccine Bivalent Booster 12+ (Pfizer) 11/05/2022     Influenza (IIV3) PF 10/22/2008, 09/25/2009, 11/07/2011, 11/30/2012, 10/27/2013, 10/02/2014     Influenza Vaccine >6 months (Alfuria,Fluzone) 10/02/2015, 09/20/2019, 09/16/2020, 09/28/2021, 11/12/2022     Influenza Vaccine,  "6+MO IM (QUADRIVALENT W/PRESERVATIVES) 09/12/2016, 10/16/2017, 09/20/2018     OPV, trivalent, live 10/20/1978     Pneumo Conj 13-V (2010&after) 09/26/2018     Pneumococcal 23 valent 09/28/2019     TDAP Vaccine (Boostrix) 07/20/2012     Td (Adult), Adsorbed 10/20/1978       ROS A comprehensive review of systems was performed and was otherwise negative    Medications, allergies, and problem list were reviewed and updated    Exam  /72 (BP Location: Right arm, Patient Position: Sitting, Cuff Size: Adult Regular)   Pulse 99   Temp 98.1  F (36.7  C)   Resp 20   Ht 1.727 m (5' 8\")   Wt 87.1 kg (192 lb)   SpO2 99%   BMI 29.19 kg/m    Lungs are clear to auscultation.  There is no wheezing or crackles or dullness.  No rhonchi.  Heart is tachycardic but regular, no rub or gallop or murmur.  No ankle edema.  Abdomen is nontender.    Assessment/Plan  1. Chronic maxillary sinusitis  Significant improved symptoms.  He will continue on the nasal steroid and nasal saline irrigation.  Just saw ENT today    2. Chronic cough  Likely postnasal drip at this time.  He did have right upper lobe pneumonia with possibly some asthma exacerbation last month.  Continue current treatment.    With his cough significantly better, patient did not want to do a follow-up chest imaging.  I did discuss doing a chest CT scan to follow-up for resolution, especially with his immunosuppression.  But we did discuss that there is some radiation exposure with the CT scan.  He declined follow-up imaging for the CT scan    3. History of pneumonia  If recurrent symptoms, get reevaluated right away.    Post 10-day treatment of doxycycline for what was likely an atypical pneumonia    4. Pierce's esophagus without dysplasia  Continue Prilosec, daily recommended by GI yesterday    5. Transplanted heart (H)  Follow-up with the transplant team this coming winter.  Patient is stable.  On immunosuppression.    6. Mild intermittent asthma without " complication  No flare at this time.  He may have had some flare of his asthma with pneumonia last month.  Continue Flovent at this time.  Albuterol as needed    Follow-up in the spring for checkup    October 2022 DEXA scan without severe osteoporosis, plan was to not restart the alendronate and recheck in 2 years.    Hypothyroid with normal TSH and July      Return in about 5 months (around 4/23/2023) for Follow up.   Patient Instructions   No change in treatment plan at this time.  Continue the nasal steroid with fluticasone.  Continue the inhaled steroid for asthma, Flovent.  Use albuterol as needed.  Continue with nasal saline lavage.    Follow-up if your cough worsens, or respiratory symptoms/pneumonia symptoms recur.    Otherwise routine follow-up with me in the spring.    Continue the omeprazole daily for reflux.    Fredrick Wolff MD, MD        Current Outpatient Medications   Medication Sig Dispense Refill     acetaminophen (TYLENOL) 325 MG tablet Take 2 tablets (650 mg) by mouth every 4 hours as needed for mild pain       albuterol (PROAIR HFA/PROVENTIL HFA/VENTOLIN HFA) 108 (90 Base) MCG/ACT inhaler Inhale 2 puffs into the lungs every 6 hours as needed for shortness of breath / dyspnea or wheezing 18 g 0     amLODIPine (NORVASC) 5 MG tablet TAKE 2 TABLETS BY MOUTH EVERY DAY 60 tablet 11     aspirin (ASA) 81 MG chewable tablet Take 1 tablet (81 mg) by mouth daily       azaTHIOprine (IMURAN) 50 MG tablet Take 2 tablets (100 mg) by mouth daily 180 tablet 3     calcium carbonate 600 mg-vitamin D 400 units (CALTRATE) 600-400 MG-UNIT per tablet Take 1 tablet by mouth 2 times daily (with meals)       cetirizine (ZYRTEC) 10 MG tablet Take 10 mg by mouth daily       DULoxetine (CYMBALTA) 20 MG capsule Take 1 capsule (20 mg) by mouth daily 90 capsule 3     fluticasone (FLOVENT HFA) 220 MCG/ACT inhaler Inhale 2 puffs into the lungs 2 times daily 12 g 0     levothyroxine (SYNTHROID/LEVOTHROID) 75 MCG tablet Take 1  tablet (75 mcg) by mouth daily 90 tablet 3     losartan (COZAAR) 25 MG tablet Take 3 tablets (75 mg) by mouth daily 270 tablet 3     melatonin 3 MG tablet Take 2 tablets (6 mg) by mouth At Bedtime (Patient taking differently: Take 3 mg by mouth nightly as needed)       multivitamin w/minerals (THERA-VIT-M) tablet Take 1 tablet by mouth daily       omeprazole (PRILOSEC) 40 MG DR capsule Take 1 capsule (40 mg) by mouth 2 times daily (before meals) 90 capsule 3     RESTASIS 0.05 % ophthalmic emulsion Place 1 drop into both eyes 2 times daily       rosuvastatin (CRESTOR) 40 MG tablet Take 1 tablet (40 mg) by mouth daily 90 tablet 3     sirolimus (GENERIC EQUIVALENT) 1 MG tablet TAKE 4 TABLETS BY MOUTH  DAILY 120 tablet 11     Allergies   Allergen Reactions     Hydromorphone Other (See Comments)     Significant Delirium     Adhesive Tape Blisters     Tegaderm causes bruises, blisters and extreme irritation.     Lisinopril Other (See Comments)     hypotension     Quinolones Other (See Comments)     Would not rx quinolones until QTc interval improved.      Tobramycin Other (See Comments)     Would not use aminoglycosides as his kidney function is very borderline     Codeine Nausea     Social History     Tobacco Use     Smoking status: Former     Packs/day: 1.00     Years: 25.00     Pack years: 25.00     Types: Cigarettes     Start date: 1980     Quit date: 2008     Years since quittin.0     Smokeless tobacco: Never   Substance Use Topics     Alcohol use: Yes     Alcohol/week: 1.0 - 2.0 standard drink     Types: 1 - 2 Cans of beer per week     Drug use: No             Subjective   Everton is a 57 year old, presenting for the following health issues:  Cough (Follow up from one month ago.  Patient saw matt Ramirez has a littler bit of a cough/)      Cough    History of Present Illness       Reason for visit:  Pneumonia    He eats 2-3 servings of fruits and vegetables daily.He consumes 0 sweetened beverage(s)  daily.He exercises with enough effort to increase his heart rate 9 or less minutes per day.  He exercises with enough effort to increase his heart rate 3 or less days per week.   He is taking medications regularly.             Review of Systems   Respiratory: Positive for cough.             Objective    There were no vitals taken for this visit.  There is no height or weight on file to calculate BMI.  Physical Exam

## 2022-11-23 NOTE — LETTER
11/23/2022         RE: Everton Larios  [address] [id number]        Dear Colleague,    Thank you for referring your patient, Everton Larios, to the [facility]. Please see a copy of my visit note below.    HPI: This patient is a 58yo F who presents to discuss his CT scan. When he was here last, he had a chest cough that he was told to have looked at by his PCP/Pulm and it turns out that was a PNA, which has now been treated. With regards to the nose, he has had some issues with chronic congestion and rhinorrhea. He has used nasal steroids in the past without much effect. He is s/p transplant now. Denies fevers, otalgia, weight loss, odynophagia, dysphagia, hemoptysis, and shortness of breath.      Past medical history, surgical history, social history, family history, medications, and allergies have been reviewed with the patient and are documented above.     Review of Systems: a 10-system review was performed. Pertinent positives are noted in the HPI and on a separate scanned document in the chart.     PHYSICAL EXAMINATION (observation only):  GEN: no acute distress, normocephalic  EYES: extraocular movements are intact, pupils are equal and round. Sclera clear.   EARS: auricles are normally formed.   NECK: soft and supple. Airway is midline.  NEURO: CN VII and XII symmetric. alert and oriented. No spontaneous nystagmus. Gait is normal.  PULM: breathing comfortably on room air, normal chest expansion with respiration.   CARDS: no cyanosis or clubbing     CT SINUS: images and report reviewed and my impression is that there is bilateral anterior sinus chronic inflammation and circumferential mucosal thickening R>L. There is also a significantly deviated septum to the right with a spur nearly in contact with the middle and inferior turbinates.   IMPRESSION:  1.  Marked mucosal thickening throughout the paranasal sinuses as detailed above.  2.  Small air-fluid layer in the  left maxillary sinus.   3.  Soft tissue obstruction of the frontal sinus drainage pathways and bilateral ostiomeatal units.  4.  Rightward deviation of the nasal septum.    MEDICAL DECISION-MAKING: This patient is a 58yo M with mild to moderate anterior sinus inflammation and a deviated septum. Discussed the risks and benefits of a septo/PRITs in addition to a mini-FESS to open the anterior ethmoids and maxillary sinuses. Explained that these thins would not be expected to solve all symptoms, but may move the needle and/or help to allow sprays to be more effective. He will think about it and call us if he wishes to pursue this further.       Again, thank you for allowing me to participate in the care of your patient.        Sincerely,        Jory De Oliveira MD

## 2022-11-23 NOTE — PROGRESS NOTES
HPI: This patient is a 58yo F who presents to discuss his CT scan. When he was here last, he had a chest cough that he was told to have looked at by his PCP/Pulm and it turns out that was a PNA, which has now been treated. With regards to the nose, he has had some issues with chronic congestion and rhinorrhea. He has used nasal steroids in the past without much effect. He is s/p transplant now. Denies fevers, otalgia, weight loss, odynophagia, dysphagia, hemoptysis, and shortness of breath.      Past medical history, surgical history, social history, family history, medications, and allergies have been reviewed with the patient and are documented above.     Review of Systems: a 10-system review was performed. Pertinent positives are noted in the HPI and on a separate scanned document in the chart.     PHYSICAL EXAMINATION (observation only):  GEN: no acute distress, normocephalic  EYES: extraocular movements are intact, pupils are equal and round. Sclera clear.   EARS: auricles are normally formed.   NECK: soft and supple. Airway is midline.  NEURO: CN VII and XII symmetric. alert and oriented. No spontaneous nystagmus. Gait is normal.  PULM: breathing comfortably on room air, normal chest expansion with respiration.   CARDS: no cyanosis or clubbing     CT SINUS: images and report reviewed and my impression is that there is bilateral anterior sinus chronic inflammation and circumferential mucosal thickening R>L. There is also a significantly deviated septum to the right with a spur nearly in contact with the middle and inferior turbinates.   IMPRESSION:  1.  Marked mucosal thickening throughout the paranasal sinuses as detailed above.  2.  Small air-fluid layer in the left maxillary sinus.   3.  Soft tissue obstruction of the frontal sinus drainage pathways and bilateral ostiomeatal units.  4.  Rightward deviation of the nasal septum.    MEDICAL DECISION-MAKING: This patient is a 58yo M with mild to moderate  anterior sinus inflammation and a deviated septum. Discussed the risks and benefits of a septo/PRITs in addition to a mini-FESS to open the anterior ethmoids and maxillary sinuses. Explained that these thins would not be expected to solve all symptoms, but may move the needle and/or help to allow sprays to be more effective. He will think about it and call us if he wishes to pursue this further.

## 2022-11-24 RX ORDER — FLUTICASONE PROPIONATE 220 UG/1
2 AEROSOL, METERED RESPIRATORY (INHALATION) 2 TIMES DAILY
Qty: 12 G | Refills: 3 | Status: SHIPPED | OUTPATIENT
Start: 2022-11-24 | End: 2023-05-09

## 2022-11-24 NOTE — TELEPHONE ENCOUNTER
"Last Written Prescription Date:  10/26/2022  Last Fill Quantity: 12 g,  # refills: 0   Last office visit provider:  11/23/2022     Requested Prescriptions   Pending Prescriptions Disp Refills     fluticasone (FLOVENT HFA) 220 MCG/ACT inhaler 12 g 0     Sig: Inhale 2 puffs into the lungs 2 times daily       Inhaled Steroids Protocol Passed - 11/24/2022  3:53 PM        Passed - Patient is age 12 or older        Passed - Asthma control assessment score within normal limits in last 6 months     Please review ACT score.           Passed - Medication is active on med list        Passed - Recent (6 mo) or future (30 days) visit within the authorizing provider's specialty     Patient had office visit in the last 6 months or has a visit in the next 30 days with authorizing provider or within the authorizing provider's specialty.  See \"Patient Info\" tab in inbasket, or \"Choose Columns\" in Meds & Orders section of the refill encounter.                 Marie Nicole RN 11/24/22 3:54 PM  "

## 2022-12-07 DIAGNOSIS — Z94.1 TRANSPLANTED HEART (H): ICD-10-CM

## 2022-12-08 DIAGNOSIS — R11.0 NAUSEA: ICD-10-CM

## 2022-12-09 RX ORDER — OMEPRAZOLE 40 MG/1
40 CAPSULE, DELAYED RELEASE ORAL
Qty: 90 CAPSULE | Refills: 3 | Status: SHIPPED | OUTPATIENT
Start: 2022-12-09 | End: 2022-12-13

## 2022-12-09 RX ORDER — AZATHIOPRINE 50 MG/1
TABLET ORAL
Qty: 180 TABLET | Refills: 3 | Status: SHIPPED | OUTPATIENT
Start: 2022-12-09 | End: 2022-12-28

## 2022-12-09 NOTE — TELEPHONE ENCOUNTER
"Routing refill request to provider for review/approval because:  diagnosis of osteoporosis on record    Last Written Prescription Date:  10/19/22  Last Fill Quantity: 90,  # refills: 3   Last office visit provider:  11/23/22     Requested Prescriptions   Pending Prescriptions Disp Refills     omeprazole (PRILOSEC) 40 MG DR capsule 90 capsule 3     Sig: Take 1 capsule (40 mg) by mouth 2 times daily (before meals)       PPI Protocol Failed - 12/8/2022  3:12 PM        Failed - No diagnosis of osteoporosis on record        Passed - Not on Clopidogrel (unless Pantoprazole ordered)        Passed - Recent (12 mo) or future (30 days) visit within the authorizing provider's specialty     Patient has had an office visit with the authorizing provider or a provider within the authorizing providers department within the previous 12 mos or has a future within next 30 days. See \"Patient Info\" tab in inbasket, or \"Choose Columns\" in Meds & Orders section of the refill encounter.              Passed - Medication is active on med list        Passed - Patient is age 18 or older             Bozena Taylor RN 12/09/22 1:19 PM  "

## 2022-12-11 ENCOUNTER — LAB (OUTPATIENT)
Dept: LAB | Facility: CLINIC | Age: 57
End: 2022-12-11
Payer: COMMERCIAL

## 2022-12-11 DIAGNOSIS — Z13.220 LIPID SCREENING: ICD-10-CM

## 2022-12-11 DIAGNOSIS — Z94.1 TRANSPLANTED HEART (H): ICD-10-CM

## 2022-12-11 DIAGNOSIS — N18.32 CHRONIC RENAL IMPAIRMENT, STAGE 3B (H): ICD-10-CM

## 2022-12-11 LAB
ALBUMIN SERPL BCG-MCNC: 4.1 G/DL (ref 3.5–5.2)
ALP SERPL-CCNC: 99 U/L (ref 40–129)
ALT SERPL W P-5'-P-CCNC: 26 U/L (ref 10–50)
ANION GAP SERPL CALCULATED.3IONS-SCNC: 13 MMOL/L (ref 7–15)
AST SERPL W P-5'-P-CCNC: 24 U/L (ref 10–50)
BASOPHILS # BLD AUTO: 0 10E3/UL (ref 0–0.2)
BASOPHILS NFR BLD AUTO: 0 %
BILIRUB SERPL-MCNC: 0.3 MG/DL
BUN SERPL-MCNC: 27 MG/DL (ref 8–23)
CALCIUM SERPL-MCNC: 9.1 MG/DL (ref 8.6–10)
CHLORIDE SERPL-SCNC: 104 MMOL/L (ref 98–107)
CHOLEST SERPL-MCNC: 233 MG/DL
CREAT SERPL-MCNC: 1.87 MG/DL (ref 0.67–1.17)
CREAT UR-MCNC: 96.4 MG/DL
DEPRECATED HCO3 PLAS-SCNC: 22 MMOL/L (ref 22–29)
EOSINOPHIL # BLD AUTO: 0.1 10E3/UL (ref 0–0.7)
EOSINOPHIL NFR BLD AUTO: 4 %
ERYTHROCYTE [DISTWIDTH] IN BLOOD BY AUTOMATED COUNT: 15.2 % (ref 10–15)
GFR SERPL CREATININE-BSD FRML MDRD: 41 ML/MIN/1.73M2
GLUCOSE SERPL-MCNC: 88 MG/DL (ref 70–99)
HBA1C MFR BLD: 5.5 % (ref 0–5.6)
HCT VFR BLD AUTO: 34.1 % (ref 40–53)
HDLC SERPL-MCNC: 89 MG/DL
HGB BLD-MCNC: 11 G/DL (ref 13.3–17.7)
IMM GRANULOCYTES # BLD: 0 10E3/UL
IMM GRANULOCYTES NFR BLD: 0 %
LDLC SERPL CALC-MCNC: 129 MG/DL
LYMPHOCYTES # BLD AUTO: 1.1 10E3/UL (ref 0.8–5.3)
LYMPHOCYTES NFR BLD AUTO: 35 %
MCH RBC QN AUTO: 27.6 PG (ref 26.5–33)
MCHC RBC AUTO-ENTMCNC: 32.3 G/DL (ref 31.5–36.5)
MCV RBC AUTO: 86 FL (ref 78–100)
MICROALBUMIN UR-MCNC: 1329 MG/L
MICROALBUMIN/CREAT UR: 1378.63 MG/G CR (ref 0–17)
MONOCYTES # BLD AUTO: 0.3 10E3/UL (ref 0–1.3)
MONOCYTES NFR BLD AUTO: 11 %
NEUTROPHILS # BLD AUTO: 1.6 10E3/UL (ref 1.6–8.3)
NEUTROPHILS NFR BLD AUTO: 51 %
NONHDLC SERPL-MCNC: 144 MG/DL
PLATELET # BLD AUTO: 180 10E3/UL (ref 150–450)
POTASSIUM SERPL-SCNC: 5 MMOL/L (ref 3.4–5.3)
PROT SERPL-MCNC: 7.4 G/DL (ref 6.4–8.3)
RBC # BLD AUTO: 3.99 10E6/UL (ref 4.4–5.9)
SODIUM SERPL-SCNC: 139 MMOL/L (ref 136–145)
TRIGL SERPL-MCNC: 74 MG/DL
WBC # BLD AUTO: 3.2 10E3/UL (ref 4–11)

## 2022-12-11 PROCEDURE — 83036 HEMOGLOBIN GLYCOSYLATED A1C: CPT

## 2022-12-11 PROCEDURE — 36415 COLL VENOUS BLD VENIPUNCTURE: CPT

## 2022-12-11 PROCEDURE — 85025 COMPLETE CBC W/AUTO DIFF WBC: CPT

## 2022-12-11 PROCEDURE — 80061 LIPID PANEL: CPT

## 2022-12-11 PROCEDURE — 80053 COMPREHEN METABOLIC PANEL: CPT

## 2022-12-11 PROCEDURE — 82043 UR ALBUMIN QUANTITATIVE: CPT

## 2022-12-11 PROCEDURE — 87799 DETECT AGENT NOS DNA QUANT: CPT

## 2022-12-12 DIAGNOSIS — J45.20 INTERMITTENT ASTHMA WITHOUT COMPLICATION, UNSPECIFIED ASTHMA SEVERITY: ICD-10-CM

## 2022-12-12 LAB
CMV DNA SPEC NAA+PROBE-ACNC: NOT DETECTED IU/ML
EBV DNA COPIES/ML, INSTRUMENT: 1484 COPIES/ML
EBV DNA SPEC NAA+PROBE-LOG#: 3.2 {LOG_COPIES}/ML

## 2022-12-13 ENCOUNTER — TELEPHONE (OUTPATIENT)
Dept: INTERNAL MEDICINE | Facility: CLINIC | Age: 57
End: 2022-12-13

## 2022-12-13 DIAGNOSIS — R11.0 NAUSEA: ICD-10-CM

## 2022-12-13 RX ORDER — ALBUTEROL SULFATE 90 UG/1
2 AEROSOL, METERED RESPIRATORY (INHALATION) EVERY 6 HOURS PRN
Qty: 18 G | Refills: 5 | Status: SHIPPED | OUTPATIENT
Start: 2022-12-13 | End: 2023-11-08

## 2022-12-13 RX ORDER — OMEPRAZOLE 40 MG/1
40 CAPSULE, DELAYED RELEASE ORAL DAILY
Qty: 90 CAPSULE | Refills: 3 | Status: SHIPPED | OUTPATIENT
Start: 2022-12-13 | End: 2023-12-29

## 2022-12-13 NOTE — TELEPHONE ENCOUNTER
"Last Written Prescription Date:  11/18/22  Last Fill Quantity: 18 g,  # refills: 0   Last office visit provider:  11/23/22     Requested Prescriptions   Pending Prescriptions Disp Refills     albuterol (PROAIR HFA/PROVENTIL HFA/VENTOLIN HFA) 108 (90 Base) MCG/ACT inhaler 18 g 0     Sig: Inhale 2 puffs into the lungs every 6 hours as needed for shortness of breath or wheezing       Asthma Maintenance Inhalers - Anticholinergics Passed - 12/13/2022  2:39 PM        Passed - Patient is age 12 years or older        Passed - Asthma control assessment score within normal limits in last 6 months     Please review ACT score.           Passed - Medication is active on med list        Passed - Recent (6 mo) or future (30 days) visit within the authorizing provider's specialty     Patient had office visit in the last 6 months or has a visit in the next 30 days with authorizing provider or within the authorizing provider's specialty.  See \"Patient Info\" tab in inbasket, or \"Choose Columns\" in Meds & Orders section of the refill encounter.           Short-Acting Beta Agonist Inhalers Protocol  Passed - 12/13/2022  2:39 PM        Passed - Patient is age 12 or older        Passed - Asthma control assessment score within normal limits in last 6 months     Please review ACT score.           Passed - Medication is active on med list        Passed - Recent (6 mo) or future (30 days) visit within the authorizing provider's specialty     Patient had office visit in the last 6 months or has a visit in the next 30 days with authorizing provider or within the authorizing provider's specialty.  See \"Patient Info\" tab in inbasket, or \"Choose Columns\" in Meds & Orders section of the refill encounter.                 Duong Rhodes RN 12/13/22 2:40 PM  "

## 2022-12-13 NOTE — TELEPHONE ENCOUNTER
Dr. Wolff    Please advise.  Insurance will only cover omeprazole 40 mg once daily not twice daily.

## 2022-12-14 ENCOUNTER — TELEPHONE (OUTPATIENT)
Dept: INTERNAL MEDICINE | Facility: CLINIC | Age: 57
End: 2022-12-14

## 2022-12-14 NOTE — TELEPHONE ENCOUNTER
ALTERNATIVE REQUESTED FOR OMEPRAZOLE OR START A PA. INSURANCE ONLY COVERS 1 CAP A DAY FOR OMEPRAZOLE.

## 2022-12-15 NOTE — TELEPHONE ENCOUNTER
Central Prior Authorization Team   Phone: [phone]      PA Initiation    Medication: omeprazole (PRILOSEC) 40 MG DR capsule - INITIATED  Insurance Company: [employer] ([facility]) - Phone [phone] Fax [phone]  Pharmacy Filling the Rx: [id number] - [place], [place] - [address] [place] [address] E  Filling Pharmacy Phone: [phone]  Filling Pharmacy Fax:    Start Date: 12/15/2022

## 2022-12-15 NOTE — TELEPHONE ENCOUNTER
Prior Authorization Not Needed per Insurance    Medication: omeprazole (PRILOSEC) 40 MG DR capsule - PA NOT NEEDED  Insurance Company: [employer] ([facility]) - Phone [phone] Fax [phone]  Expected CoPay:      Ph[id number] [place] [address]  Pharmacy Notified: Yes - verified pharmacy received paid claim  Patient Notified: Yes (pharmacy will notify patient when ready)

## 2022-12-15 NOTE — TELEPHONE ENCOUNTER
When initiating a PA for this medication I seen the only active RX on the patient's profile is for 1 capsule once daily. Which does not require a PA. Per pharmacy they have the once daily filled and pending . Looking further into this request I seen there was a recent discontinue of the twice daily directions. Please clarify with pharmacy and patient which rx the patient should be taking.     Thank you,  Monique Vargas CPhT, Prior Authorization Specialist

## 2022-12-16 NOTE — TELEPHONE ENCOUNTER
Left pt a detailed message to call us back.  We need to know how often he is taking his omeprazole.  Is he taking 40mg once a day or twice daily.  Please ask patient when he calls back.

## 2022-12-26 ENCOUNTER — TELEPHONE (OUTPATIENT)
Dept: TRANSPLANT | Facility: CLINIC | Age: 57
End: 2022-12-26

## 2022-12-26 DIAGNOSIS — Z94.1 TRANSPLANTED HEART (H): Primary | ICD-10-CM

## 2022-12-26 NOTE — TELEPHONE ENCOUNTER
Pt waiting to repeat rapa. Standing orders in. When pt able he will have completed.     No other concerns verbalized at this time.

## 2022-12-28 ENCOUNTER — TELEPHONE (OUTPATIENT)
Dept: TRANSPLANT | Facility: CLINIC | Age: 57
End: 2022-12-28

## 2022-12-28 DIAGNOSIS — Z94.1 TRANSPLANTED HEART (H): ICD-10-CM

## 2022-12-28 RX ORDER — AZATHIOPRINE 50 MG/1
100 TABLET ORAL DAILY
Qty: 180 TABLET | Refills: 3 | Status: SHIPPED | OUTPATIENT
Start: 2022-12-28 | End: 2023-05-10

## 2022-12-28 NOTE — TELEPHONE ENCOUNTER
General  Route to LPN    Reason for call: Everton called to see if we can send a refill and a script renewal to [facility] for his azaTHIOprine (IMURAN) 50 MG tablet    Call back needed? Yes  Return Call Needed  Same as documented in contacts section  When to return call?: Same day: Route High Priority

## 2023-01-02 ENCOUNTER — LAB (OUTPATIENT)
Dept: LAB | Facility: CLINIC | Age: 58
End: 2023-01-02
Payer: COMMERCIAL

## 2023-01-02 DIAGNOSIS — Z94.1 TRANSPLANTED HEART (H): ICD-10-CM

## 2023-01-02 LAB
ANION GAP SERPL CALCULATED.3IONS-SCNC: 12 MMOL/L (ref 7–15)
BUN SERPL-MCNC: 28.2 MG/DL (ref 8–23)
CALCIUM SERPL-MCNC: 9.7 MG/DL (ref 8.6–10)
CHLORIDE SERPL-SCNC: 105 MMOL/L (ref 98–107)
CREAT SERPL-MCNC: 1.87 MG/DL (ref 0.67–1.17)
DEPRECATED HCO3 PLAS-SCNC: 21 MMOL/L (ref 22–29)
ERYTHROCYTE [DISTWIDTH] IN BLOOD BY AUTOMATED COUNT: 15.2 % (ref 10–15)
GFR SERPL CREATININE-BSD FRML MDRD: 41 ML/MIN/1.73M2
GLUCOSE SERPL-MCNC: 110 MG/DL (ref 70–99)
HCT VFR BLD AUTO: 33 % (ref 40–53)
HGB BLD-MCNC: 10.8 G/DL (ref 13.3–17.7)
MCH RBC QN AUTO: 27.5 PG (ref 26.5–33)
MCHC RBC AUTO-ENTMCNC: 32.7 G/DL (ref 31.5–36.5)
MCV RBC AUTO: 84 FL (ref 78–100)
PLATELET # BLD AUTO: 167 10E3/UL (ref 150–450)
POTASSIUM SERPL-SCNC: 4.7 MMOL/L (ref 3.4–5.3)
RBC # BLD AUTO: 3.93 10E6/UL (ref 4.4–5.9)
SIROLIMUS BLD-MCNC: 4.8 UG/L (ref 5–15)
SODIUM SERPL-SCNC: 138 MMOL/L (ref 136–145)
TME LAST DOSE: ABNORMAL H
TME LAST DOSE: ABNORMAL H
WBC # BLD AUTO: 3.4 10E3/UL (ref 4–11)

## 2023-01-02 PROCEDURE — 80048 BASIC METABOLIC PNL TOTAL CA: CPT

## 2023-01-02 PROCEDURE — 80195 ASSAY OF SIROLIMUS: CPT

## 2023-01-02 PROCEDURE — 36415 COLL VENOUS BLD VENIPUNCTURE: CPT

## 2023-01-02 PROCEDURE — 85027 COMPLETE CBC AUTOMATED: CPT

## 2023-01-06 DIAGNOSIS — Z94.1 TRANSPLANTED HEART (H): Primary | ICD-10-CM

## 2023-01-09 ENCOUNTER — TELEPHONE (OUTPATIENT)
Dept: TRANSPLANT | Facility: CLINIC | Age: 58
End: 2023-01-09

## 2023-01-09 NOTE — TELEPHONE ENCOUNTER
Coordinator Jocelynn WILLSON asked me to call pt to reschedule JANETTE appointments ob 2/7/23. Left pt VM to call back to reschedule

## 2023-02-16 DIAGNOSIS — Z12.5 PROSTATE CANCER SCREENING: ICD-10-CM

## 2023-02-16 DIAGNOSIS — Z13.29 THYROID DISORDER SCREENING: ICD-10-CM

## 2023-02-16 DIAGNOSIS — Z94.1 TRANSPLANTED HEART (H): Primary | ICD-10-CM

## 2023-02-16 DIAGNOSIS — Z13.220 LIPID SCREENING: ICD-10-CM

## 2023-02-16 RX ORDER — ASPIRIN 325 MG
325 TABLET ORAL ONCE
Status: CANCELLED | OUTPATIENT
Start: 2023-02-16 | End: 2023-02-16

## 2023-02-16 RX ORDER — SODIUM CHLORIDE 9 MG/ML
INJECTION, SOLUTION INTRAVENOUS CONTINUOUS
Status: CANCELLED | OUTPATIENT
Start: 2023-02-16

## 2023-02-16 RX ORDER — LIDOCAINE 40 MG/G
CREAM TOPICAL
Status: CANCELLED | OUTPATIENT
Start: 2023-02-16

## 2023-02-16 RX ORDER — ASPIRIN 81 MG/1
243 TABLET, CHEWABLE ORAL ONCE
Status: CANCELLED | OUTPATIENT
Start: 2023-02-16

## 2023-02-21 DIAGNOSIS — F34.1 DYSTHYMIA: ICD-10-CM

## 2023-02-22 RX ORDER — DULOXETIN HYDROCHLORIDE 20 MG/1
20 CAPSULE, DELAYED RELEASE ORAL DAILY
Qty: 90 CAPSULE | Refills: 3 | Status: SHIPPED | OUTPATIENT
Start: 2023-02-22 | End: 2024-03-18

## 2023-02-22 NOTE — TELEPHONE ENCOUNTER
"Routing refill request to provider for review/approval because:  phq 9    Last Written Prescription Date:  2/8/22  Last Fill Quantity: 90,  # refills: 3   Last office visit provider:  11/23/22     Requested Prescriptions   Pending Prescriptions Disp Refills     DULoxetine (CYMBALTA) 20 MG capsule 90 capsule 3     Sig: Take 1 capsule (20 mg) by mouth daily       Serotonin-Norepinephrine Reuptake Inhibitors  Failed - 2/21/2023  6:26 PM        Failed - PHQ-9 score of less than 5 in past 6 months     Please review last PHQ-9 score.           Passed - Blood pressure under 140/90 in past 12 months     BP Readings from Last 3 Encounters:   11/23/22 124/72   11/22/22 128/86   10/26/22 122/80                 Passed - Medication is active on med list        Passed - Patient is age 18 or older        Passed - Recent (6 mo) or future (30 days) visit within the authorizing provider's specialty     Patient had office visit in the last 6 months or has a visit in the next 30 days with authorizing provider or within the authorizing provider's specialty.  See \"Patient Info\" tab in inbasket, or \"Choose Columns\" in Meds & Orders section of the refill encounter.                 Nelida Johnson RN 02/22/23 1:03 PM  "

## 2023-02-23 ENCOUNTER — HOSPITAL ENCOUNTER (OUTPATIENT)
Facility: CLINIC | Age: 58
Discharge: HOME | End: 2023-02-23
Attending: INTERNAL MEDICINE | Admitting: INTERNAL MEDICINE
Payer: COMMERCIAL

## 2023-02-23 ENCOUNTER — HOSPITAL ENCOUNTER (OUTPATIENT)
Dept: CARDIOLOGY | Facility: CLINIC | Age: 58
Discharge: HOME | End: 2023-02-23
Attending: STUDENT IN AN ORGANIZED HEALTH CARE EDUCATION/TRAINING PROGRAM | Admitting: INTERNAL MEDICINE
Payer: COMMERCIAL

## 2023-02-23 ENCOUNTER — APPOINTMENT (OUTPATIENT)
Dept: MEDSURG UNIT | Facility: CLINIC | Age: 58
End: 2023-02-23
Attending: INTERNAL MEDICINE
Payer: COMMERCIAL

## 2023-02-23 ENCOUNTER — LAB (OUTPATIENT)
Dept: LAB | Facility: CLINIC | Age: 58
End: 2023-02-23
Attending: STUDENT IN AN ORGANIZED HEALTH CARE EDUCATION/TRAINING PROGRAM
Payer: COMMERCIAL

## 2023-02-23 ENCOUNTER — HOSPITAL ENCOUNTER (OUTPATIENT)
Dept: GENERAL RADIOLOGY | Facility: CLINIC | Age: 58
Discharge: HOME | End: 2023-02-23
Attending: STUDENT IN AN ORGANIZED HEALTH CARE EDUCATION/TRAINING PROGRAM | Admitting: INTERNAL MEDICINE
Payer: COMMERCIAL

## 2023-02-23 VITALS
DIASTOLIC BLOOD PRESSURE: 81 MMHG | HEART RATE: 93 BPM | TEMPERATURE: 97.7 F | RESPIRATION RATE: 17 BRPM | WEIGHT: 180 LBS | SYSTOLIC BLOOD PRESSURE: 123 MMHG | BODY MASS INDEX: 27.28 KG/M2 | HEIGHT: 68 IN | OXYGEN SATURATION: 99 %

## 2023-02-23 DIAGNOSIS — Z94.1 TRANSPLANTED HEART (H): ICD-10-CM

## 2023-02-23 DIAGNOSIS — D72.9 ABNORMAL WHITE BLOOD CELL (WBC) COUNT: Primary | ICD-10-CM

## 2023-02-23 DIAGNOSIS — Z13.220 LIPID SCREENING: ICD-10-CM

## 2023-02-23 DIAGNOSIS — Z13.29 THYROID DISORDER SCREENING: ICD-10-CM

## 2023-02-23 DIAGNOSIS — Z94.1 HEART REPLACED BY TRANSPLANT (H): ICD-10-CM

## 2023-02-23 DIAGNOSIS — Z12.5 PROSTATE CANCER SCREENING: ICD-10-CM

## 2023-02-23 LAB
ALBUMIN SERPL BCG-MCNC: 4.2 G/DL (ref 3.5–5.2)
ALBUMIN UR-MCNC: 300 MG/DL
ALP SERPL-CCNC: 92 U/L (ref 40–129)
ALT SERPL W P-5'-P-CCNC: 25 U/L (ref 10–50)
ANION GAP SERPL CALCULATED.3IONS-SCNC: 13 MMOL/L (ref 7–15)
APPEARANCE UR: CLEAR
AST SERPL W P-5'-P-CCNC: 23 U/L (ref 10–50)
BASOPHILS # BLD AUTO: 0 10E3/UL (ref 0–0.2)
BASOPHILS NFR BLD AUTO: 0 %
BILIRUB SERPL-MCNC: 0.3 MG/DL
BILIRUB UR QL STRIP: NEGATIVE
BUN SERPL-MCNC: 31.5 MG/DL (ref 8–23)
CALCIUM SERPL-MCNC: 9 MG/DL (ref 8.6–10)
CHLORIDE SERPL-SCNC: 107 MMOL/L (ref 98–107)
CHOLEST SERPL-MCNC: 224 MG/DL
CK SERPL-CCNC: 199 U/L (ref 39–308)
CMV DNA SPEC NAA+PROBE-ACNC: NOT DETECTED IU/ML
COLOR UR AUTO: YELLOW
CREAT SERPL-MCNC: 1.93 MG/DL (ref 0.67–1.17)
DEPRECATED HCO3 PLAS-SCNC: 19 MMOL/L (ref 22–29)
DONOR IDENTIFICATION: NORMAL
DSA COMMENTS: NORMAL
DSA PRESENT: NO
DSA TEST METHOD: NORMAL
EOSINOPHIL # BLD AUTO: 0.1 10E3/UL (ref 0–0.7)
EOSINOPHIL NFR BLD AUTO: 5 %
ERYTHROCYTE [DISTWIDTH] IN BLOOD BY AUTOMATED COUNT: 14.8 % (ref 10–15)
GFR SERPL CREATININE-BSD FRML MDRD: 39 ML/MIN/1.73M2
GLUCOSE SERPL-MCNC: 107 MG/DL (ref 70–99)
GLUCOSE UR STRIP-MCNC: NEGATIVE MG/DL
HCT VFR BLD AUTO: 34.3 % (ref 40–53)
HDLC SERPL-MCNC: 86 MG/DL
HGB BLD-MCNC: 10.5 G/DL (ref 13.3–17.7)
HGB BLD-MCNC: 10.7 G/DL (ref 13.3–17.7)
HGB BLD-MCNC: 11.3 G/DL (ref 13.3–17.7)
HGB UR QL STRIP: ABNORMAL
IMM GRANULOCYTES # BLD: 0 10E3/UL
IMM GRANULOCYTES NFR BLD: 0 %
KETONES UR STRIP-MCNC: NEGATIVE MG/DL
LDLC SERPL CALC-MCNC: 116 MG/DL
LEUKOCYTE ESTERASE UR QL STRIP: NEGATIVE
LVEF ECHO: NORMAL
LYMPHOCYTES # BLD AUTO: 1 10E3/UL (ref 0.8–5.3)
LYMPHOCYTES NFR BLD AUTO: 32 %
MAGNESIUM SERPL-MCNC: 1.9 MG/DL (ref 1.7–2.3)
MCH RBC QN AUTO: 28 PG (ref 26.5–33)
MCHC RBC AUTO-ENTMCNC: 32.9 G/DL (ref 31.5–36.5)
MCV RBC AUTO: 85 FL (ref 78–100)
MONOCYTES # BLD AUTO: 0.3 10E3/UL (ref 0–1.3)
MONOCYTES NFR BLD AUTO: 9 %
MUCOUS THREADS #/AREA URNS LPF: PRESENT /LPF
NEUTROPHILS # BLD AUTO: 1.7 10E3/UL (ref 1.6–8.3)
NEUTROPHILS NFR BLD AUTO: 54 %
NITRATE UR QL: NEGATIVE
NONHDLC SERPL-MCNC: 138 MG/DL
NRBC # BLD AUTO: 0 10E3/UL
NRBC BLD AUTO-RTO: 0 /100
ORGAN: NORMAL
OXYHGB MFR BLDV: 72 % (ref 92–100)
OXYHGB MFR BLDV: 96 % (ref 92–100)
PH UR STRIP: 6 [PH] (ref 5–7)
PHOSPHATE SERPL-MCNC: 3.9 MG/DL (ref 2.5–4.5)
PLATELET # BLD AUTO: 179 10E3/UL (ref 150–450)
POTASSIUM SERPL-SCNC: 4.7 MMOL/L (ref 3.4–5.3)
PROT SERPL-MCNC: 7.3 G/DL (ref 6.4–8.3)
PSA SERPL-MCNC: 0.7 NG/ML (ref 0–3.5)
RBC # BLD AUTO: 4.04 10E6/UL (ref 4.4–5.9)
RBC URINE: 1 /HPF
SA 1 CELL: NORMAL
SA 1 TEST METHOD: NORMAL
SA 2 CELL: NORMAL
SA 2 TEST METHOD: NORMAL
SA1 HI RISK ABY: NORMAL
SA1 MOD RISK ABY: NORMAL
SA2 HI RISK ABY: NORMAL
SA2 MOD RISK ABY: NORMAL
SIROLIMUS BLD-MCNC: 6.4 UG/L (ref 5–15)
SODIUM SERPL-SCNC: 139 MMOL/L (ref 136–145)
SP GR UR STRIP: 1.02 (ref 1–1.03)
SQUAMOUS EPITHELIAL: <1 /HPF
TME LAST DOSE: NORMAL H
TME LAST DOSE: NORMAL H
TRANSITIONAL EPI: <1 /HPF
TRIGL SERPL-MCNC: 109 MG/DL
TSH SERPL DL<=0.005 MIU/L-ACNC: 0.35 UIU/ML (ref 0.3–4.2)
UNACCEPTABLE ANTIGENS: NORMAL
UNOS CPRA: 0
UROBILINOGEN UR STRIP-MCNC: NORMAL MG/DL
WBC # BLD AUTO: 3.1 10E3/UL (ref 4–11)
WBC URINE: 1 /HPF
ZZZSA 1  COMMENTS: NORMAL
ZZZSA 2 COMMENTS: NORMAL

## 2023-02-23 PROCEDURE — 85004 AUTOMATED DIFF WBC COUNT: CPT | Performed by: STUDENT IN AN ORGANIZED HEALTH CARE EDUCATION/TRAINING PROGRAM

## 2023-02-23 PROCEDURE — 93306 TTE W/DOPPLER COMPLETE: CPT | Mod: 26 | Performed by: INTERNAL MEDICINE

## 2023-02-23 PROCEDURE — 250N000013 HC RX MED GY IP 250 OP 250 PS 637: Performed by: STUDENT IN AN ORGANIZED HEALTH CARE EDUCATION/TRAINING PROGRAM

## 2023-02-23 PROCEDURE — 999N000054 HC STATISTIC EKG NON-CHARGEABLE

## 2023-02-23 PROCEDURE — 999N000142 HC STATISTIC PROCEDURE PREP ONLY

## 2023-02-23 PROCEDURE — 84443 ASSAY THYROID STIM HORMONE: CPT | Performed by: STUDENT IN AN ORGANIZED HEALTH CARE EDUCATION/TRAINING PROGRAM

## 2023-02-23 PROCEDURE — 86832 HLA CLASS I HIGH DEFIN QUAL: CPT | Performed by: STUDENT IN AN ORGANIZED HEALTH CARE EDUCATION/TRAINING PROGRAM

## 2023-02-23 PROCEDURE — 36415 COLL VENOUS BLD VENIPUNCTURE: CPT | Performed by: STUDENT IN AN ORGANIZED HEALTH CARE EDUCATION/TRAINING PROGRAM

## 2023-02-23 PROCEDURE — 999N000134 HC STATISTIC PP CARE STAGE 3

## 2023-02-23 PROCEDURE — 71046 X-RAY EXAM CHEST 2 VIEWS: CPT

## 2023-02-23 PROCEDURE — 99152 MOD SED SAME PHYS/QHP 5/>YRS: CPT | Performed by: INTERNAL MEDICINE

## 2023-02-23 PROCEDURE — 93456 R HRT CORONARY ARTERY ANGIO: CPT | Performed by: INTERNAL MEDICINE

## 2023-02-23 PROCEDURE — 93306 TTE W/DOPPLER COMPLETE: CPT

## 2023-02-23 PROCEDURE — 86833 HLA CLASS II HIGH DEFIN QUAL: CPT | Performed by: STUDENT IN AN ORGANIZED HEALTH CARE EDUCATION/TRAINING PROGRAM

## 2023-02-23 PROCEDURE — 99153 MOD SED SAME PHYS/QHP EA: CPT | Performed by: INTERNAL MEDICINE

## 2023-02-23 PROCEDURE — 84100 ASSAY OF PHOSPHORUS: CPT | Performed by: STUDENT IN AN ORGANIZED HEALTH CARE EDUCATION/TRAINING PROGRAM

## 2023-02-23 PROCEDURE — 93010 ELECTROCARDIOGRAM REPORT: CPT | Performed by: INTERNAL MEDICINE

## 2023-02-23 PROCEDURE — 85018 HEMOGLOBIN: CPT | Mod: 59

## 2023-02-23 PROCEDURE — 80053 COMPREHEN METABOLIC PANEL: CPT | Performed by: STUDENT IN AN ORGANIZED HEALTH CARE EDUCATION/TRAINING PROGRAM

## 2023-02-23 PROCEDURE — 99152 MOD SED SAME PHYS/QHP 5/>YRS: CPT | Mod: GC | Performed by: INTERNAL MEDICINE

## 2023-02-23 PROCEDURE — 71046 X-RAY EXAM CHEST 2 VIEWS: CPT | Mod: 26 | Performed by: RADIOLOGY

## 2023-02-23 PROCEDURE — 250N000011 HC RX IP 250 OP 636: Performed by: INTERNAL MEDICINE

## 2023-02-23 PROCEDURE — G0103 PSA SCREENING: HCPCS | Performed by: STUDENT IN AN ORGANIZED HEALTH CARE EDUCATION/TRAINING PROGRAM

## 2023-02-23 PROCEDURE — 80195 ASSAY OF SIROLIMUS: CPT | Performed by: STUDENT IN AN ORGANIZED HEALTH CARE EDUCATION/TRAINING PROGRAM

## 2023-02-23 PROCEDURE — 81001 URINALYSIS AUTO W/SCOPE: CPT | Performed by: STUDENT IN AN ORGANIZED HEALTH CARE EDUCATION/TRAINING PROGRAM

## 2023-02-23 PROCEDURE — 272N000001 HC OR GENERAL SUPPLY STERILE: Performed by: INTERNAL MEDICINE

## 2023-02-23 PROCEDURE — C1894 INTRO/SHEATH, NON-LASER: HCPCS | Performed by: INTERNAL MEDICINE

## 2023-02-23 PROCEDURE — 82810 BLOOD GASES O2 SAT ONLY: CPT

## 2023-02-23 PROCEDURE — 82550 ASSAY OF CK (CPK): CPT | Performed by: STUDENT IN AN ORGANIZED HEALTH CARE EDUCATION/TRAINING PROGRAM

## 2023-02-23 PROCEDURE — 250N000009 HC RX 250: Performed by: INTERNAL MEDICINE

## 2023-02-23 PROCEDURE — 86352 CELL FUNCTION ASSAY W/STIM: CPT | Performed by: STUDENT IN AN ORGANIZED HEALTH CARE EDUCATION/TRAINING PROGRAM

## 2023-02-23 PROCEDURE — 80061 LIPID PANEL: CPT | Performed by: STUDENT IN AN ORGANIZED HEALTH CARE EDUCATION/TRAINING PROGRAM

## 2023-02-23 PROCEDURE — 87799 DETECT AGENT NOS DNA QUANT: CPT | Performed by: STUDENT IN AN ORGANIZED HEALTH CARE EDUCATION/TRAINING PROGRAM

## 2023-02-23 PROCEDURE — 93456 R HRT CORONARY ARTERY ANGIO: CPT | Mod: 26 | Performed by: INTERNAL MEDICINE

## 2023-02-23 PROCEDURE — 93005 ELECTROCARDIOGRAM TRACING: CPT

## 2023-02-23 PROCEDURE — 83735 ASSAY OF MAGNESIUM: CPT | Performed by: STUDENT IN AN ORGANIZED HEALTH CARE EDUCATION/TRAINING PROGRAM

## 2023-02-23 RX ORDER — FENTANYL CITRATE 50 UG/ML
25 INJECTION, SOLUTION INTRAMUSCULAR; INTRAVENOUS
Status: DISCONTINUED | OUTPATIENT
Start: 2023-02-23 | End: 2023-02-23 | Stop reason: HOSPADM

## 2023-02-23 RX ORDER — IOPAMIDOL 755 MG/ML
INJECTION, SOLUTION INTRAVASCULAR
Status: DISCONTINUED | OUTPATIENT
Start: 2023-02-23 | End: 2023-02-23 | Stop reason: HOSPADM

## 2023-02-23 RX ORDER — NALOXONE HYDROCHLORIDE 0.4 MG/ML
0.4 INJECTION, SOLUTION INTRAMUSCULAR; INTRAVENOUS; SUBCUTANEOUS
Status: DISCONTINUED | OUTPATIENT
Start: 2023-02-23 | End: 2023-02-23 | Stop reason: HOSPADM

## 2023-02-23 RX ORDER — NALOXONE HYDROCHLORIDE 0.4 MG/ML
0.2 INJECTION, SOLUTION INTRAMUSCULAR; INTRAVENOUS; SUBCUTANEOUS
Status: DISCONTINUED | OUTPATIENT
Start: 2023-02-23 | End: 2023-02-23 | Stop reason: HOSPADM

## 2023-02-23 RX ORDER — OXYCODONE HYDROCHLORIDE 10 MG/1
10 TABLET ORAL EVERY 4 HOURS PRN
Status: DISCONTINUED | OUTPATIENT
Start: 2023-02-23 | End: 2023-02-23 | Stop reason: HOSPADM

## 2023-02-23 RX ORDER — FLUMAZENIL 0.1 MG/ML
0.2 INJECTION, SOLUTION INTRAVENOUS
Status: DISCONTINUED | OUTPATIENT
Start: 2023-02-23 | End: 2023-02-23 | Stop reason: HOSPADM

## 2023-02-23 RX ORDER — OXYCODONE HYDROCHLORIDE 5 MG/1
5 TABLET ORAL EVERY 4 HOURS PRN
Status: DISCONTINUED | OUTPATIENT
Start: 2023-02-23 | End: 2023-02-23 | Stop reason: HOSPADM

## 2023-02-23 RX ORDER — ASPIRIN 325 MG
325 TABLET ORAL ONCE
Status: COMPLETED | OUTPATIENT
Start: 2023-02-23 | End: 2023-02-23

## 2023-02-23 RX ORDER — LIDOCAINE 40 MG/G
CREAM TOPICAL
Status: DISCONTINUED | OUTPATIENT
Start: 2023-02-23 | End: 2023-02-23 | Stop reason: HOSPADM

## 2023-02-23 RX ORDER — ASPIRIN 81 MG/1
243 TABLET, CHEWABLE ORAL ONCE
Status: COMPLETED | OUTPATIENT
Start: 2023-02-23 | End: 2023-02-23

## 2023-02-23 RX ORDER — ATROPINE SULFATE 0.1 MG/ML
0.5 INJECTION INTRAVENOUS
Status: DISCONTINUED | OUTPATIENT
Start: 2023-02-23 | End: 2023-02-23 | Stop reason: HOSPADM

## 2023-02-23 RX ORDER — DIPHENHYDRAMINE HYDROCHLORIDE 50 MG/ML
INJECTION INTRAMUSCULAR; INTRAVENOUS
Status: DISCONTINUED | OUTPATIENT
Start: 2023-02-23 | End: 2023-02-23 | Stop reason: HOSPADM

## 2023-02-23 RX ORDER — FENTANYL CITRATE 50 UG/ML
INJECTION, SOLUTION INTRAMUSCULAR; INTRAVENOUS
Status: DISCONTINUED | OUTPATIENT
Start: 2023-02-23 | End: 2023-02-23 | Stop reason: HOSPADM

## 2023-02-23 RX ORDER — SODIUM CHLORIDE 9 MG/ML
INJECTION, SOLUTION INTRAVENOUS CONTINUOUS
Status: DISCONTINUED | OUTPATIENT
Start: 2023-02-23 | End: 2023-02-23 | Stop reason: HOSPADM

## 2023-02-23 RX ADMIN — ASPIRIN 81 MG 243 MG: 81 TABLET ORAL at 09:26

## 2023-02-23 ASSESSMENT — ACTIVITIES OF DAILY LIVING (ADL)
ADLS_ACUITY_SCORE: 35

## 2023-02-23 NOTE — PROGRESS NOTES
Arrived for an annual CORS/RHC s/p heart transplant 2019.  VSS.  Very anxious.  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  PIV placed.  H&P current.  Consent obtained.  Labs resulted.  Ready for procedure.

## 2023-02-23 NOTE — PROGRESS NOTES
Returned from CCL s/p routine RHC and CORS.  VSS.  Denies pain.  RIJ site CDI.  Right groin site CDI.  Resting in bed.

## 2023-02-23 NOTE — DISCHARGE INSTRUCTIONS
[facility]                        Interventional Cardiology  Discharge Instructions   Post Right Heart Cath      AFTER YOU GO HOME:  DO drink plenty of fluids  DO resume your regular diet and medications unless otherwise instructed by your Primary Physician  Do Not scrub the procedure site vigorously  No lotion or powder to the puncture site for 3 days    CALL YOUR PRIMARY PHYSICIAN IF: You may resume all normal activity.  Monitor neck site for bleeding, swelling, or voice changes. If you notice bleeding or swelling immediately apply pressure to the site and call number below to speak with Cardiology Fellow.  If you experience any changes in your breathing you should call your doctor immediately or come to the closest Emergency Department.  Do not drive yourself.    ADDITIONAL INSTRUCTIONS: Medications: You are to resume all home medications including anticoagulation therapy unless otherwise advised by your primary cardiologist or nurse coordinator.    Follow Up: Per your primary cardiology team    If you have any questions or concerns regarding your procedure site please call [phone] at anytime and ask for Cardiology Fellow on call.  They are available 24 hours a day.  You may also contact the Cardiology Clinic after hours number at [phone].                                                       Telephone Numbers [phone] Monday-Friday 8:00 am to 4:30 pm    [phone] After 4:30 pm Monday-Friday, Weekends & Holidays  Ask for Interventional Cardiologist on call. Someone is on call 24 hours/day   [facility] toll free number [phone] Monday-Friday 8:00 am to 4:30 pm   [facility] Emergency Dept [phone]              [facility]                        Interventional Cardiology  Discharge Instructions   Post Angiogram (Cardiac)      AFTER YOU GO HOME:  DO NOT drive or operate machinery at home or at work for at least 24 hours  If you were given sedation:  we recommend that an adult stay with you for 24 hours  DO relax and take it easy for 24 hours  DO drink plenty of fluids  DO resume your regular diet, unless otherwise instructed by your Primary Physician  DO NOT SMOKE FOR AT LEAST 24 HOURS. If you have been given any mediations that were to help you relax or sedate you during your procedure  DO NOT drink alcoholic beverages the day of your procedure  DO NOT do any strenuous exercise or lifting (>10 lbs) for at least 7 days following your procedure  DO NOT take a tub bath, get in a hot tub, or pool for at least 5 days following your procedure  Do Not scrub the procedure site vigorously  DO NOT make any important or legal decisions for 24 hours following your procedure    CALL YOUR PRIMARY PHYSICIAN IF:  You start bleeding from the procedure site. If you do start to bleed from that site, lie down flat and hold pressure on the site. Call your physician, your physician will tell you if you need to return to the hospital. It is common to have a small bruise or lump at the site  You have numbness, coolness or change in color of the leg of the puncture site    You experience pain or redness at the puncture site    You develop hives or a rash or unexplained itching  You develop a temperature of 101 degrees F or greater    ADDITIONAL INSTRUCTIONS   Support the puncture site for coughing, sneezing, or moving your bowels for the first 48 hours  No lotion or powder to the puncture site for 3 days    [facility] INTERVENTIONAL CARDIOLOGY DEPARTMENT:    Procedure Physician: Dr. Mckeon                                          Date of Procedure: February 23, 2023    Telephone Numbers [phone] Monday-Friday 8:00 am to 4:30 pm    [phone] After 4:30 pm Monday-Friday, Weekends & Holidays  Ask for Interventional Cardiologist on call. Someone is on call 24 hours/day   [facility] toll free number [phone] Monday-Friday 8:00 am to 4:30 pm   [facility] Emergency Dept [phone]

## 2023-02-23 NOTE — PROGRESS NOTES
Tolerated bedrest without issues.  Tolerated food, fluids, ambulation and urination.  [facility] site CDI.  Right groin, CDI.  Reviewed discharge instructions with Everton.  Discharged to home with brother.

## 2023-02-24 ENCOUNTER — ANCILLARY PROCEDURE (OUTPATIENT)
Dept: MRI IMAGING | Facility: CLINIC | Age: 58
End: 2023-02-24
Attending: STUDENT IN AN ORGANIZED HEALTH CARE EDUCATION/TRAINING PROGRAM
Payer: COMMERCIAL

## 2023-02-24 ENCOUNTER — OFFICE VISIT (OUTPATIENT)
Dept: CARDIOLOGY | Facility: CLINIC | Age: 58
End: 2023-02-24
Attending: STUDENT IN AN ORGANIZED HEALTH CARE EDUCATION/TRAINING PROGRAM
Payer: COMMERCIAL

## 2023-02-24 VITALS
WEIGHT: 190.3 LBS | OXYGEN SATURATION: 98 % | SYSTOLIC BLOOD PRESSURE: 130 MMHG | HEART RATE: 101 BPM | DIASTOLIC BLOOD PRESSURE: 89 MMHG | HEIGHT: 67 IN | BODY MASS INDEX: 29.87 KG/M2

## 2023-02-24 DIAGNOSIS — D84.9 IMMUNOSUPPRESSION (H): ICD-10-CM

## 2023-02-24 DIAGNOSIS — N18.32 CHRONIC KIDNEY DISEASE, STAGE 3B (H): ICD-10-CM

## 2023-02-24 DIAGNOSIS — Z94.1 TRANSPLANTED HEART (H): Primary | ICD-10-CM

## 2023-02-24 DIAGNOSIS — R16.0 LIVER MASS: ICD-10-CM

## 2023-02-24 DIAGNOSIS — E78.5 HYPERLIPIDEMIA LDL GOAL <100: ICD-10-CM

## 2023-02-24 LAB
EBV DNA # SPEC NAA+PROBE: <500 COPIES/ML
EBV DNA SPEC NAA+PROBE-LOG#: <2.7 {LOG_COPIES}/ML

## 2023-02-24 PROCEDURE — 74183 MRI ABD W/O CNTR FLWD CNTR: CPT | Performed by: RADIOLOGY

## 2023-02-24 PROCEDURE — 99215 OFFICE O/P EST HI 40 MIN: CPT | Performed by: STUDENT IN AN ORGANIZED HEALTH CARE EDUCATION/TRAINING PROGRAM

## 2023-02-24 PROCEDURE — A9581 GADOXETATE DISODIUM INJ: HCPCS | Performed by: RADIOLOGY

## 2023-02-24 PROCEDURE — G0463 HOSPITAL OUTPT CLINIC VISIT: HCPCS | Performed by: STUDENT IN AN ORGANIZED HEALTH CARE EDUCATION/TRAINING PROGRAM

## 2023-02-24 ASSESSMENT — PAIN SCALES - GENERAL: PAINLEVEL: NO PAIN (0)

## 2023-02-24 NOTE — NURSING NOTE
Transplant Coordinator Note    Reason for visit: 4th annual w/angio/rhc/  Coordinator: Present       Health concerns addressed today:  1. -- Ulcerative colitis will need colonoscopy in 2 years (3/2024), and Pierce's screening in 3 years  -- MRI liver to follow-up liver nodule  2. 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   3. Recent pneumonia.   4. WBC 3.1.   5. Cr = 1.9~sees nephrology locally.  6. Borderline cholesterol. Improving. Losing weight.   7. Phlelgm and head pressure. Seeing ENT. If surgery, contact me ngoc we would have to switch off rapa  8. Right shoulder issues.       Immunosuppressants:  Imuran 100 mg daily  Rapa goal 5-7, current dose 4 mg daily. Level 6.4. no changes.     Routine screenings:    Derm: 7/2021, Due  Dental: up to date.   Colonoscopy: 3/8/2022, Due 2024.   EGD: 3/8/2022, every 3 years.   Prostate:  0.69  Eye: 11/22  Flu/Pneumonia: Update.   Covid: 5/5, last 11/5/2022       Angio/rhc/echo/cxr/resulted labs reviewed with patient  Medication record reviewed and reconciled  Questions and concerns addressed  Pt verbalized an understanding of plan of care.     Patient Instructions  1. Continue to keep up the good work with diet and exercise!  2. Let me know if you decide to have sinus or shoulder surgery.   3. We will repeat allomap when able.   4. Please see dermatology when able.    Next transplant clinic appointment: Next annual w/ stress cardiac MRI.   Next lab draw: 6 months unless needed.   Coordinator will call with all pending results.     Please call transplant coordinator with any questions:    Jocelynn Jerez RN BSN   Post Heart Transplant Nurse Coordinator  [facility]  Questions: [phone]

## 2023-02-24 NOTE — PROGRESS NOTES
Advanced Heart Failure/Transplant Clinic    HPI:  Everton Larios is a 57 year old male with a history of ASD (s/p repair in childhood), AFib/AF (s/p ablation), NICM, diastolic dysfunction, alcohol abuse, Pierce's esophagus, ulcerative colitis, GIB (s/p splenic embolization), and hypothyroidism, now s/p OHT and bypass of persistent left SVC to right atrial appendage 2/24/19 who presents to clinic for ongoing evaluation and management. Patient previously followed with Dr. Donis.    His postoperative course was c/b shock due to RV dysfunction requiring IABP support, small pneumoperitoneum (clinically insignificant), chest wall hematoma (former ICD site, s/p evacuation and drain placement 3/31/19), HCAP/klebsiella pneumonia, and FRANKLIN (required short-term HD, now recovered).  His biopsy 3/25/19 showed mild AMR1 with some staining for c4d. Repeat biopsy 3/28 showed improved AMR and less reactive capillary staining, and subsequent biopsies have now shown resolution of AMR and improved capillary staining.  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 Given concerns for MMF colitis, patient was switched to Imuran in March 2022    Today patient reports that has been feeling well overall. He denies any chest pain or pressure, sob/andrade, orthopnea, pnd, palpitations, syncope/presyncope or marielle. He any denies fever/chills, sore throat, swollen lymph nodes, productive cough, abdominal pain, or N/V/D. Patient has chronic sinus issues, but currently not to the point he wants to fix his deviated septum. Patient also has chronic right shoulder pain, but deciding if he wants to work this up more. He also denies any problems with his medications and reports compliance.    ROS:  A complete 12-point ROS was negative except as above.    Past Medical History:   Diagnosis Date     Alcohol abuse       Arthritis     hands, neck     ASD (atrial septal defect)     s/p repair age 3     Asthma      Atrial fibrillation (H)      Pierce's esophagus 10/4/2018     Pierce's esophagus      Cataract      CHF (congestive heart failure) (H)      Chronic rhinitis 10/4/2018     Chronic systolic heart failure (H) 10/4/2018     Clotting disorder (H)      Congenital cardiomyopathy in  (H)      Depression 10/4/2018     Depression      Diastolic dysfunction      DJD (degenerative joint disease)     neck     DJD (degenerative joint disease) - neck 10/4/2018     H/O congenital atrial septal defect (ASD) repair at age 3 10/4/2018     History of anesthesia complications     nausea     History of transfusion      Hypothyroidism      Hypothyroidism due to medication 10/4/2018     ICD (implantable cardioverter-defibrillator) in place      ICD, [employer] ; gen change 2018 10/4/2018     Intermittent asthma without complication 10/4/2018     Nonischemic cardiomyopathy (H) 10/4/2018     On amiodarone therapy 10/4/2018     Pacemaker      Paroxysmal atrial fibrillation (H) 10/4/2018     S/P ablation of atrial fibrillation 10/4/2018     Systolic heart failure (H)      Ulcerative colitis (H)      Ulcerative colitis (H)      Ventricular tachycardia 10/4/2018     Ventricular tachycardia        Past Surgical History:   Procedure Laterality Date     ABLATION OF DYSRHYTHMIC FOCUS      for A fib     AICD, DUAL CHAMBER       ASD REPAIR  1968     BRONCHOSCOPY (RIGID OR FLEXIBLE), DIAGNOSTIC N/A 2019    Procedure: BRONCHOSCOPY, WITH BAL;  Surgeon: Margot Chiang MD;  Location:  GI     CARDIAC DEFIBRILLATOR PLACEMENT      x2--last was 2018     COLONOSCOPY N/A 2020    Procedure: COLONOSCOPY, WITH POLYPECTOMY AND BIOPSY;  Surgeon: Ranjeet Cooper MD;  Location:  GI     COLONOSCOPY N/A 2015    Procedure: COLONOSCOPY with biopsy;  Surgeon: Fernie Akers MD;  Location: [facility];   Service:      COLONOSCOPY N/A 12/19/2017    Procedure: COLONOSCOPY with biopsies and polypectomies using snare;  Surgeon: Gael Romero MD;  Location: [facility];  Service:      COLONOSCOPY N/A 3/8/2022    Procedure: COLONOSCOPY, WITH POLYPECTOMY AND BIOPSY;  Surgeon: Paddy Saldivar DO;  Location: [facility] GI     CV CORONARY ANGIOGRAM N/A 02/12/2020    Procedure: CV CORONARY ANGIOGRAM;  Surgeon: Isiah Mcallister MD;  Location: [facility]     CV CORONARY ANGIOGRAM N/A 03/17/2021    Procedure: CV CORONARY ANGIOGRAM;  Surgeon: Santino Mckeon MD;  Location: [facility]     CV CORONARY ANGIOGRAM N/A 2/23/2022    Procedure: CV CORONARY ANGIOGRAM;  Surgeon: Yves Rodrigues MD;  [facility]: [facility]     CV HEART BIOPSY N/A 03/04/2019    Procedure: Heart Biopsy;  Surgeon: Abhijeet Titus MD;  [facility]: [facility]     CV HEART BIOPSY N/A 03/25/2019    Procedure: Heart Biopsy;  Surgeon: Yves Rodrigues MD;  [facility]     CV HEART BIOPSY N/A 03/28/2019    Procedure: Heart Cath Heart Biopsy;  Surgeon: Yves Rodrigues MD;  [facility]     CV HEART BIOPSY N/A 04/10/2019    Procedure: CV HEART BIOPSY;  Surgeon: Isiah Mcallister MD;  [facility]     CV HEART BIOPSY N/A 04/24/2019    Procedure: CV HEART BIOPSY;  Surgeon: Isiah Mcallister MD;  [facility]     CV HEART BIOPSY N/A 05/08/2019    Procedure: CV HEART BIOPSY;  Surgeon: Isiah Mcallister MD;  [facility]     CV HEART BIOPSY N/A 06/04/2019    Procedure: CV HEART BIOPSY;  Surgeon: Alton Solomon MD;  [facility]     CV HEART BIOPSY N/A 05/22/2019    Procedure: CV HEART BIOPSY;  Surgeon: Yves Rodrigues MD;  [facility]: [facility]     CV HEART BIOPSY N/A 07/17/2019    Procedure: CV HEART BIOPSY;  Surgeon: Isiah Mcallister  MD Rodrikc;  Location:  HEART CARDIAC CATH LAB     CV HEART BIOPSY N/A 08/13/2019    Procedure: CV HEART BIOPSY;  Surgeon: Jackson Patten MD;  Location:  HEART CARDIAC CATH LAB     CV HEART BIOPSY N/A 10/15/2019    Procedure: CV HEART BIOPSY;  Surgeon: Santino Mckeon MD;  Location:  HEART CARDIAC CATH LAB     CV HEART BIOPSY N/A 02/12/2020    Procedure: CV HEART BIOPSY;  Surgeon: Isiah Mcallister MD;  Location:  HEART CARDIAC CATH LAB     CV HEART BIOPSY N/A 05/05/2020    Procedure: CV HEART BIOPSY;  Surgeon: Yves Rodrigues MD;  Location:  HEART CARDIAC CATH LAB     CV HEART BIOPSY N/A 03/17/2021    Procedure: CV HEART BIOPSY;  Surgeon: Santino Mckeon MD;  Location:  HEART CARDIAC CATH LAB     CV HEART BIOPSY N/A 5/10/2022    Procedure: Heart Biopsy;  Surgeon: Yves Rodrigues MD;  Location:  HEART CARDIAC CATH LAB     CV INTRA AORTIC BALLOON N/A 02/18/2019    Procedure: Intra-Aortic Balloon;  Surgeon: Alton Solomon MD;  Location:  HEART CARDIAC CATH LAB     CV INTRA AORTIC BALLOON N/A 02/20/2019    Procedure: Replace subclavian IABP;  Surgeon: Yves Rodrigues MD;  Location:  HEART CARDIAC CATH LAB     CV INTRAVASULAR ULTRASOUND N/A 02/12/2020    Procedure: CV INTRAVASCULAR ULTRASOUND;  Surgeon: Isiah Mcallister MD;  Location:  HEART CARDIAC CATH LAB     CV LEFT HEART CATH N/A 03/19/2019    Procedure: Left Heart Cath;  Surgeon: Yves Rodrigues MD;  Location:  HEART CARDIAC CATH LAB     CV LEFT HEART CATH N/A 02/12/2020    Procedure: Left Heart Cath;  Surgeon: Isiah Mcallister MD;  Location:  HEART CARDIAC CATH LAB     CV MYOCARDIAL BIOPSY N/A 03/19/2019    Procedure: RHC/HBx - Femoral access for 3/19 per Nimisha GONZALEZ;  Surgeon: Yves Rodrigues MD;  Location:  HEART CARDIAC CATH LAB     CV MYOCARDIAL BIOPSY N/A 03/11/2019    Procedure: ADD ON RHC/HBX;  Surgeon: Alton Solomon MD;  Location:  HEART CARDIAC CATH LAB     CV RIGHT  HEART CATH MEASUREMENTS RECORDED N/A 03/25/2019    Procedure: Right Heart Cath;  Surgeon: Yves Rodrigues MD;  Location:  HEART CARDIAC CATH LAB     CV RIGHT HEART CATH MEASUREMENTS RECORDED N/A 03/28/2019    Procedure: Heart Cath Right Heart Cath;  Surgeon: Yves Rodrigues MD;  Location:  HEART CARDIAC CATH LAB     CV RIGHT HEART CATH MEASUREMENTS RECORDED N/A 04/24/2019    Procedure: CV RIGHT HEART CATH;  Surgeon: Isiah Mcallister MD;  Location:  HEART CARDIAC CATH LAB     CV RIGHT HEART CATH MEASUREMENTS RECORDED N/A 06/04/2019    Procedure: CV RIGHT HEART CATH;  Surgeon: Alton Solomon MD;  Location:  HEART CARDIAC CATH LAB     CV RIGHT HEART CATH MEASUREMENTS RECORDED N/A 05/22/2019    Procedure: CV RIGHT HEART CATH;  Surgeon: Yves Rodrigues MD;  Location:  HEART CARDIAC CATH LAB     CV RIGHT HEART CATH MEASUREMENTS RECORDED N/A 08/13/2019    Procedure: CV RIGHT HEART CATH;  Surgeon: Jackson Patten MD;  Location:  HEART CARDIAC CATH LAB     CV RIGHT HEART CATH MEASUREMENTS RECORDED N/A 10/15/2019    Procedure: CV RIGHT HEART CATH;  Surgeon: Santino Mckeon MD;  Location:  HEART CARDIAC CATH LAB     CV RIGHT HEART CATH MEASUREMENTS RECORDED N/A 02/12/2020    Procedure: CV RIGHT HEART CATH;  Surgeon: Isiah Mcallister MD;  Location:  HEART CARDIAC CATH LAB     CV RIGHT HEART CATH MEASUREMENTS RECORDED N/A 03/17/2021    Procedure: CV RIGHT HEART CATH;  Surgeon: Santino Mckeon MD;  Location:  HEART CARDIAC CATH LAB     CV RIGHT HEART CATH MEASUREMENTS RECORDED N/A 2/23/2022    Procedure: CV RIGHT HEART CATH;  Surgeon: Yves Rodrigues MD;  Location:  HEART CARDIAC CATH LAB     CV RIGHT HEART CATH MEASUREMENTS RECORDED N/A 5/10/2022    Procedure: Right Heart Catheterization;  Surgeon: Yves Rodrigues MD;  Location:  HEART CARDIAC CATH LAB     ESOPHAGOSCOPY, GASTROSCOPY, DUODENOSCOPY (EGD), COMBINED N/A 12/19/2017    Procedure:  ESOPHAGOGASTRODUODENOSCOPY (EGD) with biopsies;  Surgeon: Gael Romero MD;  Location: [facility] GI;  Service:      ESOPHAGOSCOPY, GASTROSCOPY, DUODENOSCOPY (EGD), COMBINED N/A 3/8/2022    Procedure: ESOPHAGOGASTRODUODENOSCOPY, WITH BIOPSY;  Surgeon: Paddy Saldivar DO;  Location: [facility]     H STATISTIC PICC LINE INSERTION >5YR, FAILED Bilateral 10/28/2021    L sided SVC     HAND SURGERY Left      HC REVISE MEDIAN N/CARPAL TUNNEL SURG  2016    Procedure: OPEN RIGHT CARPAL TUNNEL RELEASE CORTISONE INJECTION RIGHT THUMB;  Surgeon: Duong Santoyo MD;  Location: [facility] OR;  Service: Orthopedics     INCISION AND DRAINAGE CHEST WASHOUT, COMBINED N/A 2019    Procedure: Incision And Drainage; Evacuation Right Chest Wall Hematoma;  Surgeon: Dewayne House MD;  Location: [facility]     INSERT INTRAAORTIC BALLOON PUMP Left 2019    Procedure: Insert left  Subclavian Balloon Pump,  Removal Right femoral arterial balloom pump sheath;  Surgeon: Dewayne House MD;  Location: [facility]     INSERT INTRAAORTIC BALLOON PUMP Left 2019    Procedure: SUBCLAVIAN BALLOON PUMP PLACEMENT;  Surgeon: Ben White MD;  Location: [facility]     INSERT INTRACORONARY STENT      x2     IR PICC PLACEMENT > 5 YRS OF AGE  2019     TRANSPLANT HEART RECIPIENT N/A 2019    Procedure: TRANSPLANT HEART RECIPIENT;  Surgeon: Dewayne House MD;  Location: [facility] OR       Family History   Problem Relation Age of Onset     Endometrial Cancer Mother      Osteoporosis Mother      Hypertension Father      Endometrial Cancer Maternal Grandmother      Hip fracture No family hx of      Nephrolithiasis No family hx of        Social History     Tobacco Use     Smoking status: Former     Packs/day: 1.00     Years: 25.00     Pack years: 25.00     Types: Cigarettes     Start date: 1980     Quit date: 2008     Years since quittin.3     Smokeless tobacco: Never   Substance Use Topics     Alcohol use:  Yes     Alcohol/week: 1.0 - 2.0 standard drink     Types: 1 - 2 Cans of beer per week       MEDICATIONS:  Current Outpatient Medications   Medication Sig Dispense Refill     acetaminophen (TYLENOL) 325 MG tablet Take 2 tablets (650 mg) by mouth every 4 hours as needed for mild pain       albuterol (PROAIR HFA/PROVENTIL HFA/VENTOLIN HFA) 108 (90 Base) MCG/ACT inhaler Inhale 2 puffs into the lungs every 6 hours as needed for shortness of breath or wheezing 18 g 5     amLODIPine (NORVASC) 5 MG tablet TAKE 2 TABLETS BY MOUTH EVERY DAY 60 tablet 11     aspirin (ASA) 81 MG chewable tablet Take 1 tablet (81 mg) by mouth daily       azaTHIOprine (IMURAN) 50 MG tablet Take 2 tablets (100 mg) by mouth daily 180 tablet 3     calcium carbonate 600 mg-vitamin D 400 units (CALTRATE) 600-400 MG-UNIT per tablet Take 1 tablet by mouth 2 times daily (with meals)       cetirizine (ZYRTEC) 10 MG tablet Take 10 mg by mouth daily       DULoxetine (CYMBALTA) 20 MG capsule Take 1 capsule (20 mg) by mouth daily 90 capsule 3     fluticasone (FLOVENT HFA) 220 MCG/ACT inhaler Inhale 2 puffs into the lungs 2 times daily 12 g 3     levothyroxine (SYNTHROID/LEVOTHROID) 75 MCG tablet Take 1 tablet (75 mcg) by mouth daily 90 tablet 3     losartan (COZAAR) 25 MG tablet Take 3 tablets (75 mg) by mouth daily 270 tablet 3     melatonin 3 MG tablet Take 2 tablets (6 mg) by mouth At Bedtime (Patient taking differently: Take 3 mg by mouth nightly as needed)       multivitamin w/minerals (THERA-VIT-M) tablet Take 1 tablet by mouth daily       omeprazole (PRILOSEC) 40 MG DR capsule Take 1 capsule (40 mg) by mouth daily 90 capsule 3     RESTASIS 0.05 % ophthalmic emulsion Place 1 drop into both eyes 2 times daily       rosuvastatin (CRESTOR) 40 MG tablet Take 1 tablet (40 mg) by mouth daily 90 tablet 3     sirolimus (GENERIC EQUIVALENT) 1 MG tablet TAKE 4 TABLETS BY MOUTH  DAILY 120 tablet 11       EXAM:   /89 (BP Location: Right arm, Patient Position:  "Sitting, Cuff Size: Adult Regular)   Pulse 101   Ht 1.708 m (5' 7.24\")   Wt 86.3 kg (190 lb 4.8 oz)   SpO2 98%   BMI 29.59 kg/m    General: appears comfortable, alert and interactive, in no acute distress  Head: normocephalic, atraumatic  Eyes: anicteric sclera, EOMI  Mouth: wearing mask per COVID-19 protocol  Neck: supple, no cervical adenopathy  CV: regular rate and rhythm, S1/S2, no murmur, gallop, rub, estimated JVP ~7 cm  Resp: clear, no rales or wheezing  GI: soft, nontender, nondistended, +BS  Extremities: warm, no peripheral edema, 2+ bilateral radial pulses  Neurological: normal speech and affect, no gross motor deficits  Psych: normal mood and affect  Derm: no rashes or lesions on exposed surfaces    I personally reviewed recent labs and data as below and discussed the results with the patient in clinic today.  Labs:  CBC RESULTS:  Lab Results   Component Value Date    WBC 3.1 (L) 02/23/2023    WBC 5.0 04/09/2021    RBC 4.04 (L) 02/23/2023    RBC 4.61 04/09/2021    HGB 10.5 (L) 02/23/2023    HGB 11.3 (L) 02/23/2023    HGB 11.2 (A) 04/09/2021    HCT 34.3 (L) 02/23/2023    HCT 34.6 04/09/2021    MCV 85 02/23/2023    MCV 75 04/09/2021    MCH 28.0 02/23/2023    MCH 24.3 04/09/2021    MCHC 32.9 02/23/2023    MCHC 32.4 04/09/2021    RDW 14.8 02/23/2023    RDW 14.9 04/09/2021     02/23/2023     04/09/2021       CMP RESULTS:  Lab Results   Component Value Date     02/23/2023     03/17/2021    POTASSIUM 4.7 02/23/2023    POTASSIUM 4.4 05/28/2022    POTASSIUM 3.9 03/17/2021    CHLORIDE 107 02/23/2023    CHLORIDE 107 05/28/2022    CHLORIDE 106 03/17/2021    CO2 19 (L) 02/23/2023    CO2 24 05/28/2022    CO2 24 03/17/2021    ANIONGAP 13 02/23/2023    ANIONGAP 6 05/28/2022    ANIONGAP 8 03/17/2021     (H) 02/23/2023    GLC 97 05/28/2022    GLC 85 03/17/2021    BUN 31.5 (H) 02/23/2023    BUN 31 (H) 05/28/2022    BUN 32 (H) 03/17/2021    CR 1.93 (H) 02/23/2023    CR 1.80 (H) " 03/17/2021    GFRESTIMATED 39 (L) 02/23/2023    GFRESTIMATED 34 (L) 06/22/2021    GFRESTIMATED 41 (L) 03/17/2021    GFRESTBLACK 41 (L) 06/22/2021    GFRESTBLACK 47 (L) 03/17/2021    RADAMES 9.0 02/23/2023    RADAMES 9.0 03/17/2021    BILITOTAL 0.3 02/23/2023    BILITOTAL 0.5 03/17/2021    ALBUMIN 4.2 02/23/2023    ALBUMIN 3.2 (L) 02/23/2022    ALBUMIN 3.6 03/17/2021    ALKPHOS 92 02/23/2023    ALKPHOS 131 03/17/2021    ALT 25 02/23/2023    ALT 49 03/17/2021    AST 23 02/23/2023    AST 48 (H) 03/17/2021        INR RESULTS:  Lab Results   Component Value Date    INR 1.17 (H) 09/23/2019       Lab Results   Component Value Date    MAG 1.9 02/23/2023    MAG 1.6 03/17/2021     Lab Results   Component Value Date    NTBNPI 767 09/23/2019     Lab Results   Component Value Date    NTBNP 10,986 (H) 11/15/2018     TTE 2/23/23  Interpretation Summary  Left ventricular size, wall motion and function are normal. The ejection  fraction is 55-60%.  On direct comparison, the global right ventricular function appears mildly  reduced compared to 2022, but TAPSE and S' values are similar.  No significant valve abnormalities.  The inferior vena cava is normal.  No pericardial effusion.    Coronary Angiogram/RHC 2/23/23  Coronary Findings    Diagnostic  Dominance: Right  Left Main   The vessel is large. There was 0% vessel disease.      Left Anterior Descending   The vessel is large. There was 0% vessel disease.      Left Circumflex   The vessel is moderate in size. There was 0% vessel disease.      First Obtuse Marginal Branch   The vessel is large.      Right Coronary Artery   The vessel is large. There was 0% vessel disease.         Intervention     No interventions have been documented.     Hemodynamics    Right Heart Catheterization:  /89/114 mmHg  HR 92 BPM    RA 7/11/7 mmHg  RV 40/7 mmHg  PA 40/19/28 mmHg  PCW 17/24/17 mmHg  Jaden CO 7.13 L/min Normal = 4.0-8.0 L/min  Jaden CI 3.64 L/min/m2 Normal = 2.5-4.0 L/min/m2  TD CO 6.93 L/min  Normal = 4.0-8.0 L/min  TD CI 3.54 L/min/m2 Normal = 2.5-4.0 L/min/m2  PA sat 72.4%   Hgb 10.7 g/dL   PVR 1.71 Woods units  SVR 1021 dynes-sec/cm5     ECG 2/23/23 shows sinus rhythm with nonspecific intraventricular conduction block, no acute ST-T changes    [facility] 2/23/22  RA: 10  RV: 31 (11)  PA: 30/14 (21)  PVR: 0.91  Jaden CO: 6.3    CI: 3.3  TDCO: 7.4       CI: 3.8     Angiogram 2/23/22  Left Main   The vessel is large. There was 0% vessel disease.   Left Anterior Descending   The vessel is large. There was 0% vessel disease.   Left Circumflex   The vessel is moderate in size. There was 0% vessel disease.   Right Coronary Artery   The vessel is large. There was 0% vessel disease.     CMR 2/23/22  Clinical history: 56-year-old man post orthotopic heart transplantation in 2019  Comparison CMR: March 2021  1. The left ventricle is normal in cavity size and wall thickness. There are no regional wall motion  abnormalities. The global systolic function is normal. The LVEF is 55%.  2. The right ventricle is normal in cavity size. The global systolic function is normal. The RVEF is 54%.   3. The left atrium is enlarged due to transplantation and the right atrium is normal in size.  4. There is no significant valvular disease.   5. There is patchy midmyocardial late gadolinium enhancement at the basal-mid RV insertion sites consistent with non ischemic fibrosis.    6. There is no ischemia on stress perfusion imaging.  7. There is no pericardial effusion.  8. There is no intracardiac thrombus.  CONCLUSIONS:   Post heart transplantation.  Normal biventricular size and function, LVEF 55 % and RVEF 54%.   No evidence of myocardial ischemia.  No significant changes when compared to prior study from 2021    Echo 8/31/21  Interpretation Summary  Global and regional left ventricular function is normal with an EF of 60-65%.  Global right ventricular function is normal.  Pulmonary artery systolic pressure is normal.  The inferior  vena cava was normal in size with preserved respiratory variability.  No pericardial effusion is present.    RHC and angiography 2/12/2020  Pressures Phase   Time  Systolic  Diastolic  Mean  A Wave  V Wave  EDP  Max dp/dt  HR    RA Pressures  10:37 AM    7 mmHg     9 mmHg     7 mmHg       95 bpm       RV Pressures  10:37 AM  30 mmHg         9 mmHg      95 bpm       PA Pressures  10:38 AM  30 mmHg     16 mmHg     20 mmHg         94 bpm       PCW Pressures  10:38 AM    12 mmHg     13 mmHg     13 mmHg       95 bpm       AO Pressures  11:00 AM  94 mmHg     72 mmHg     83 mmHg         98 bpm       Art Pressures  10:59 AM  120 mmHg     74 mmHg     91 mmHg         103 bpm       LV Pressures  10:59 AM  120 mmHg         17 mmHg      102 bpm          Time  Hb  SAT(%)  PO2  Content  PA Sat    PA  10:21 AM   71.5 %       71.5 %       Art  10:21 AM   100 %      14.28 mL/dL        Cardiac Output    Time  TDCO  TDCI  Jaden C.O.  Jaden C.I.  Jaden HR    Cardiac Output Results  10:21 AM  6.77 L/min     3.74 L/min/m2     5.68 L/min     3.13 L/min/m2            Left Main    The vessel was visualized by selective angiography and is large. There was 0% vessel disease.    Left Anterior Descending    The vessel was visualized by selective angiography and is large. There was 0% vessel disease. IVUS was performed on the LMCA and LAD vessels. The LMCA was engaged with a 6 Fr Ikari LF 3.5 GC and the patient was anticoagulated with IV UFH. A BMW was passed into the distal LAD without difficulty. The [place] IVUS was passed over the wire and manually pulled back while recording. Proximal LAD HERACLIO 0.3 Lumen 24.1 Vessel 29.8 19% area stenosis Mid LAD HERACLIO 0.3 Lumen 10.8 Vessel 13.7 21% area stenosis IVUS was performed on the vessel. Ultrasound supply: CATH [employer] IVUS SHRT.    Left Circumflex    The vessel was visualized by selective angiography and is moderate in size. There was 0% vessel disease.    Right Coronary Artery    The vessel was  visualized by selective angiography and is large. There was 0% vessel disease.    Conclusion    Mild intimal hyperplasia with up to 0.3 mm HERACLIO and 20% narrowing by area.    ECHO 12/18/2019  Interpretation Summary  Left ventricular function, chamber size, wall motion, and wall thickness are  normal.The EF is 60-65%.  Right ventricular function, chamber size, wall motion, and thickness are  normal.  No significant valvular abnormalities were noted.  Pulmonary artery systolic pressure is normal.  The inferior vena cava was normal in size with preserved respiratory  variability.  No pericardial effusion is present.      RHC and EMB 10/15/19:  Conclusion       Right sided filling pressures are normal.    Left sided filling pressures are normal.    Normal PA pressures.    Normal cardiac output level.    Successful collection of endomyocardial biopsies          Plan       Follow bedrest per protocol    Continued medical management and lifestyle modifications for cardiovascular risk factor optimizations.    Discharge today per protocol   Hemodynamics     Right Heart Pressures     Right sided filling pressures are normal.Left sided filling pressures are normal. Normal PA pressures.Normal cardiac output level.   Heart Biospy     Heart Biopsy     After informed consent patient was prepped and draped in the usual fashion. Under fluoroscopy guidance a 7 Fr angeled sheath was placed into the right internal jugular vein after local anesthesia with 1.0% lidocaine. 5.5 [place] Jawz bioptome was passed through the sheath to the right ventricular septum and 5 biopsies were obtained. The biopsy specimens were sent to Pathology for examination. The sheath was removed and hemostasis was obtained. The patient tolerated the procedure well and there were no complications.   Pressures Phase: Baseline      Time Systolic Diastolic Mean A Wave V Wave EDP Max dp/dt HR   RA Pressures 11:49 AM   2 mmHg    3 mmHg    4 mmHg      105 bpm      RV  Pressures 11:35 AM       1776 mmHg/sec        11:49 AM 22 mmHg        4 mmHg     105 bpm      PA Pressures 11:50 AM 20 mmHg    10 mmHg    14 mmHg        105 bpm      PCW Pressures 11:49 AM   6 mmHg    7 mmHg    7 mmHg      105 bpm      Blood Flow Results Phase: Baseline      Time Results Indexed Values   QP 11:35 AM 5.92 L/min    3.66 L/min/m2      QS 11:35 AM 5.92 L/min    3.66 L/min/m2      Blood Oximetry Phase: Baseline      Time Hb SAT(%) PO2 Content PA Sat   PA 11:35 AM  66.6 %      66.6 %      Art 11:35 AM  100 %     10.74 mL/dL       Cardiac Output Phase: Baseline      Time TDCO TDCI Jaden C.O. Jaden C.I. Jaden HR   Cardiac Output Results 11:35 AM 6.03 L/min    3.74 L/min/m2    5.92 L/min    3.66 L/min/m2        11:51 AM 6.03 L/min          Resistance Results Phase: Baseline      Time PVR SVR PVR-I SVR-I TPR TVR TPR-I TVR-I PVR/SVR TPR/TVR   Resistance Results (Metric) 11:35 .64 dsc-5     172.25 dsc-5/m2     186.62 dsc-5     301.45 dsc-5/m2         Resistance Results (Wood) 11:35 AM 1.33 MARTIN     2.15 MARTIN/m2     2.33 MARTIN     3.77 MARTIN/m2         Stoke Volume Results Phase: Baseline      Time RVSW LVSW RVSW-I LVSW-I   Stroke Work Results 11:35 AM 9.33 gm*m     5.77 gm*m/m2         TTE 10/15/19:  Interpretation Summary  Left ventricular function, chamber size, wall motion, and wall thickness are  normal.The EF is 60-65%. No regional wall motion abnormalities are seen.  Right ventricular function, chamber size, wall motion, and thickness are  normal.  Trace tricuspid insufficiency is present. Pulmonary artery systolic pressure  is normal. The inferior vena cava was normal in size with preserved  respiratory variability. No pericardial effusion is present.  There has been no change.    Assessment and Plan:    57 year old male with a history of ASD (s/p repair in childhood), AFib/AF (s/p ablation), NICM, diastolic dysfunction, alcohol abuse, Pierce's esophagus, ulcerative colitis, GIB (s/p splenic embolization), and  hypothyroidism, now s/p OHT and bypass of persistent left SVC to right atrial appendage 2/24/19 who presents for ongoing evaluation and management.    NICM s/p OHT and bypass of persistent left SVC to right atrial appendage 2/24/19  His postoperative course was c/b shock due to RV dysfunction requiring IABP support, small pneumoperitoneum (clinically insignificant), chest wall hematoma (former ICD site, s/p evacuation and drain placement 3/31/19), HCAP/klebsiella pneumonia, and FRANKLIN (required short-term HD, now recovered). His biopsy 3/25/19 showed mild AMR1 with some staining for c4d.  Repeat biopsy 3/28/19 showed improved AMR and less reactive capillary staining, and subsequent biopsies have now shown resolution of AMR and improved capillary staining.     Today patient is euvolemic by history and exam. Overall doing well. His echo confirmed normal biventricular function. No CAV and grossly normal filling pressures with normal cardiac index/output.     Serostatus:  - CMV D+/R+  - EBV D+/R+  - Toxo D-/R-     Immunosuppression:  - Sirolimus 4 mg daily, goal 5-7, level 6.4 on 2/23/23  - Azathioprine 100 mg daily     Graft function:  - BPs: Controlled, continue losartan 75 mg daily. Instructed patient to call clinic if BP consistently > 140/90  - HRs:  Stable  - fluid status:  Euvolemic, off diuretics      PPx:  - CAV: Aspirin 81mg daily and rosuvastatin 40 mg daily (Continue to monitor HLD, if not improving with diet and exercise, will refer to lipid clinic)  - GERD:  Omeprazole (note h/o carrera's esophagus)  - Osteoporosis:  Calcium/vitamin D supplements     Routine screenings  Derm: DUE  Dental: UTD  Colonoscopy: completed 3/8/2022  EGD: 3/8/2022, every 3 years.   Prostate:  stable  Eye: UTD  Flu/Pneumonia: UTD.   Covid: UTD, last booster in 11/2022     Ulcerative Colitis  - Followed by GI    Chronic Kidney Disease Stage III  - Follows with Renal    Chronic Sinus Infections  - Follows with ENT  - Discussed that if  any surgeries were to be planned in the future, would need to switch sirolimus back to tac for wound healing    Chronic Right Shoulder Pain  - Wanting to see Ortho, but will talk to his PCP    To Do:   - SOT labs per protocol  - Needs to re-establish with Derm  - Follow up in one year with cMRI stress, happy to see sooner if any new or acute issues arise    I spent a total of 40 minutes today in chart review as well as personally reviewing recent cardiac testing and/or laboratory results, today's history and examination, and discussion and counseling with the patient.    Slime Lindsay MD   of Medicine, [facility]   Advanced Heart Failure and Transplant Cardiology

## 2023-02-24 NOTE — LETTER
2/24/2023      RE: Everton Larios  [address] [facility] [id number]       Dear Colleague,    Thank you for the opportunity to participate in the care of your patient, Everton Larios, at the [facility] HEART CLINIC [place] [facility]. Please see a copy of my visit note below.    Advanced Heart Failure/Transplant Clinic    HPI:  Everton Larios is a 57 year old male with a history of ASD (s/p repair in childhood), AFib/AF (s/p ablation), NICM, diastolic dysfunction, alcohol abuse, Pierce's esophagus, ulcerative colitis, GIB (s/p splenic embolization), and hypothyroidism, now s/p OHT and bypass of persistent left SVC to right atrial appendage 2/24/19 who presents to clinic for ongoing evaluation and management. Patient previously followed with Dr. Donis.    His postoperative course was c/b shock due to RV dysfunction requiring IABP support, small pneumoperitoneum (clinically insignificant), chest wall hematoma (former ICD site, s/p evacuation and drain placement 3/31/19), HCAP/klebsiella pneumonia, and FRANKLIN (required short-term HD, now recovered).  His biopsy 3/25/19 showed mild AMR1 with some staining for c4d. Repeat biopsy 3/28 showed improved AMR and less reactive capillary staining, and subsequent biopsies have now shown resolution of AMR and improved capillary staining.  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 Given concerns for MMF colitis, patient was switched to Imuran in March 2022    Today patient reports that has been feeling well overall. He denies any chest pain or pressure, sob/andrade, orthopnea, pnd, palpitations, syncope/presyncope or marielle. He any denies fever/chills, sore throat, swollen lymph nodes, productive cough, abdominal pain, or N/V/D. Patient has chronic sinus issues, but currently not to the  point he wants to fix his deviated septum. Patient also has chronic right shoulder pain, but deciding if he wants to work this up more. He also denies any problems with his medications and reports compliance.    ROS:  A complete 12-point ROS was negative except as above.    Past Medical History:   Diagnosis Date     Alcohol abuse      Arthritis     hands, neck     ASD (atrial septal defect)     s/p repair age 3     Asthma      Atrial fibrillation (H)      Pierce's esophagus 10/4/2018     Pierce's esophagus      Cataract      CHF (congestive heart failure) (H)      Chronic rhinitis 10/4/2018     Chronic systolic heart failure (H) 10/4/2018     Clotting disorder (H)      Congenital cardiomyopathy in  (H)      Depression 10/4/2018     Depression      Diastolic dysfunction      DJD (degenerative joint disease)     neck     DJD (degenerative joint disease) - neck 10/4/2018     H/O congenital atrial septal defect (ASD) repair at age 3 10/4/2018     History of anesthesia complications     nausea     History of transfusion      Hypothyroidism      Hypothyroidism due to medication 10/4/2018     ICD (implantable cardioverter-defibrillator) in place      ICD, [employer] ; gen change 2018 10/4/2018     Intermittent asthma without complication 10/4/2018     Nonischemic cardiomyopathy (H) 10/4/2018     On amiodarone therapy 10/4/2018     Pacemaker      Paroxysmal atrial fibrillation (H) 10/4/2018     S/P ablation of atrial fibrillation 10/4/2018     Systolic heart failure (H)      Ulcerative colitis (H)      Ulcerative colitis (H)      Ventricular tachycardia 10/4/2018     Ventricular tachycardia        Past Surgical History:   Procedure Laterality Date     ABLATION OF DYSRHYTHMIC FOCUS      for A fib     AICD, DUAL CHAMBER       ASD REPAIR  1968     BRONCHOSCOPY (RIGID OR FLEXIBLE), DIAGNOSTIC N/A 2019    Procedure: BRONCHOSCOPY, WITH BAL;  Surgeon: aMrgot Chiang MD;  Location:  GI      CARDIAC DEFIBRILLATOR PLACEMENT      x2--last was Feb 2018     COLONOSCOPY N/A 02/20/2020    Procedure: COLONOSCOPY, WITH POLYPECTOMY AND BIOPSY;  Surgeon: Ranjeet Cooper MD;  [facility]     COLONOSCOPY N/A 02/05/2015    Procedure: COLONOSCOPY with biopsy;  Surgeon: Fernie Akers MD;  Location: [facility];  Service:      COLONOSCOPY N/A 12/19/2017    Procedure: COLONOSCOPY with biopsies and polypectomies using snare;  Surgeon: Gael Romero MD;  Location: [facility];  Service:      COLONOSCOPY N/A 3/8/2022    Procedure: COLONOSCOPY, WITH POLYPECTOMY AND BIOPSY;  Surgeon: Paddy Saldivar DO;  Location: [facility] GI     CV CORONARY ANGIOGRAM N/A 02/12/2020    Procedure: CV CORONARY ANGIOGRAM;  Surgeon: Isiah Mcallister MD;  Location:  HEART CARDIAC CATH LAB     CV CORONARY ANGIOGRAM N/A 03/17/2021    Procedure: CV CORONARY ANGIOGRAM;  Surgeon: Santino Mckeon MD;  [facility]     CV CORONARY ANGIOGRAM N/A 2/23/2022    Procedure: CV CORONARY ANGIOGRAM;  Surgeon: Yves Rodrigues MD;  [facility]     CV HEART BIOPSY N/A 03/04/2019    Procedure: Heart Biopsy;  Surgeon: Abhijeet Titus MD;  [facility]     CV HEART BIOPSY N/A 03/25/2019    Procedure: Heart Biopsy;  Surgeon: Yves Rodrigues MD;  [facility]     CV HEART BIOPSY N/A 03/28/2019    Procedure: Heart Cath Heart Biopsy;  Surgeon: Yves Rodrigues MD;  [facility]     CV HEART BIOPSY N/A 04/10/2019    Procedure: CV HEART BIOPSY;  Surgeon: Isiah Mcallister MD;  [facility]     CV HEART BIOPSY N/A 04/24/2019    Procedure: CV HEART BIOPSY;  Surgeon: Isiah Mcallister MD;  [facility]     CV HEART BIOPSY N/A 05/08/2019    Procedure: CV HEART BIOPSY;  Surgeon: Isiah Mcallister MD;  [facility]     CV HEART BIOPSY N/A  06/04/2019    Procedure: CV HEART BIOPSY;  Surgeon: Alton Solomon MD;  [facility] CATH LAB     CV HEART BIOPSY N/A 05/22/2019    Procedure: CV HEART BIOPSY;  Surgeon: Yves Rodrigues MD;  [facility]     CV HEART BIOPSY N/A 07/17/2019    Procedure: CV HEART BIOPSY;  Surgeon: Isiah Mcallister MD;  Location:  HEART CARDIAC CATH LAB     CV HEART BIOPSY N/A 08/13/2019    Procedure: CV HEART BIOPSY;  Surgeon: Jackson Patten MD;  [facility]     CV HEART BIOPSY N/A 10/15/2019    Procedure: CV HEART BIOPSY;  Surgeon: Santino Mckeon MD;  Location:  HEART CARDIAC CATH LAB     CV HEART BIOPSY N/A 02/12/2020    Procedure: CV HEART BIOPSY;  Surgeon: Isiah Mcallister MD;  Location:  HEART CARDIAC CATH LAB     CV HEART BIOPSY N/A 05/05/2020    Procedure: CV HEART BIOPSY;  Surgeon: Yves Rodrigues MD;  [facility]     CV HEART BIOPSY N/A 03/17/2021    Procedure: CV HEART BIOPSY;  Surgeon: Santino Mckeon MD;  Location:  HEART CARDIAC CATH LAB     CV HEART BIOPSY N/A 5/10/2022    Procedure: Heart Biopsy;  Surgeon: Yves Rodrigues MD;  Location:  HEART CARDIAC CATH LAB     CV INTRA AORTIC BALLOON N/A 02/18/2019    Procedure: Intra-Aortic Balloon;  Surgeon: Alton Solomon MD;  Location:  HEART CARDIAC CATH LAB     CV INTRA AORTIC BALLOON N/A 02/20/2019    Procedure: Replace subclavian IABP;  Surgeon: Yves Rodrigues MD;  Location:  HEART CARDIAC CATH LAB     CV INTRAVASULAR ULTRASOUND N/A 02/12/2020    Procedure: CV INTRAVASCULAR ULTRASOUND;  Surgeon: Isiah Mcallister MD;  Location:  HEART CARDIAC CATH LAB     CV LEFT HEART CATH N/A 03/19/2019    Procedure: Left Heart Cath;  Surgeon: Yves Rodrigues MD;  Location:  HEART CARDIAC CATH LAB     CV LEFT HEART CATH N/A 02/12/2020    Procedure: Left Heart Cath;  Surgeon: Isiah Mcallister MD;  [facility] CARDIAC  [facility]     CV MYOCARDIAL BIOPSY N/A 03/19/2019    Procedure: RHC/HBx - Femoral access for 3/19 per Nimisha GONZALEZ;  Surgeon: Yves Rodrigues MD;  [facility]     CV MYOCARDIAL BIOPSY N/A 03/11/2019    Procedure: ADD ON RHC/HBX;  Surgeon: Alton Solomon MD;  [facility]     CV RIGHT HEART CATH MEASUREMENTS RECORDED N/A 03/25/2019    Procedure: Right Heart Cath;  Surgeon: Yves Rodrigues MD;  [facility]     CV RIGHT HEART CATH MEASUREMENTS RECORDED N/A 03/28/2019    Procedure: Heart Cath Right Heart Cath;  Surgeon: Yves Rodrigues MD;  [facility]     CV RIGHT HEART CATH MEASUREMENTS RECORDED N/A 04/24/2019    Procedure: CV RIGHT HEART CATH;  Surgeon: Isiah Mcallister MD;  [facility]     CV RIGHT HEART CATH MEASUREMENTS RECORDED N/A 06/04/2019    Procedure: CV RIGHT HEART CATH;  Surgeon: Alton Solomon MD;  [facility]     CV RIGHT HEART CATH MEASUREMENTS RECORDED N/A 05/22/2019    Procedure: CV RIGHT HEART CATH;  Surgeon: Yves Rodrigues MD;  [facility]     CV RIGHT HEART CATH MEASUREMENTS RECORDED N/A 08/13/2019    Procedure: CV RIGHT HEART CATH;  Surgeon: Jackson Patten MD;  [facility]     CV RIGHT HEART CATH MEASUREMENTS RECORDED N/A 10/15/2019    Procedure: CV RIGHT HEART CATH;  Surgeon: Santino Mckeon MD;  [facility]AC [facility]     CV RIGHT HEART CATH MEASUREMENTS RECORDED N/A 02/12/2020    Procedure: CV RIGHT HEART CATH;  Surgeon: Isiah Mcallister MD;  [facility]     CV RIGHT HEART CATH MEASUREMENTS RECORDED N/A 03/17/2021    Procedure: CV RIGHT HEART CATH;  Surgeon: Santino Mckeon MD;  [facility]     CV RIGHT HEART CATH MEASUREMENTS RECORDED N/A 2/23/2022    Procedure: CV RIGHT HEART CATH;  Surgeon:  Yves Rodrigues MD;  Location:  HEART CARDIAC CATH LAB     CV RIGHT HEART CATH MEASUREMENTS RECORDED N/A 5/10/2022    Procedure: Right Heart Catheterization;  Surgeon: Yves Rodrigues MD;  Location:  HEART CARDIAC CATH LAB     ESOPHAGOSCOPY, GASTROSCOPY, DUODENOSCOPY (EGD), COMBINED N/A 12/19/2017    Procedure: ESOPHAGOGASTRODUODENOSCOPY (EGD) with biopsies;  Surgeon: Gael Romero MD;  Location: [facility];  Service:      ESOPHAGOSCOPY, GASTROSCOPY, DUODENOSCOPY (EGD), COMBINED N/A 3/8/2022    Procedure: ESOPHAGOGASTRODUODENOSCOPY, WITH BIOPSY;  Surgeon: Paddy Saldivar DO;  Location:  GI     H STATISTIC PICC LINE INSERTION >5YR, FAILED Bilateral 10/28/2021    L sided SVC     HAND SURGERY Left      HC REVISE MEDIAN N/CARPAL TUNNEL SURG  09/21/2016    Procedure: OPEN RIGHT CARPAL TUNNEL RELEASE CORTISONE INJECTION RIGHT THUMB;  Surgeon: Duong Santoyo MD;  Location: [facility] OR;  Service: Orthopedics     INCISION AND DRAINAGE CHEST WASHOUT, COMBINED N/A 03/21/2019    Procedure: Incision And Drainage; Evacuation Right Chest Wall Hematoma;  Surgeon: Dewayne House MD;  [facility]     INSERT INTRAAORTIC BALLOON PUMP Left 02/19/2019    Procedure: Insert left  Subclavian Balloon Pump,  Removal Right femoral arterial balloom pump sheath;  Surgeon: Dewayne House MD;  [facility]     INSERT INTRAAORTIC BALLOON PUMP Left 02/21/2019    Procedure: SUBCLAVIAN BALLOON PUMP PLACEMENT;  Surgeon: Ben White MD;  [facility]     INSERT INTRACORONARY STENT      x2     IR PICC PLACEMENT > 5 YRS OF AGE  05/08/2019     TRANSPLANT HEART RECIPIENT N/A 02/24/2019    Procedure: TRANSPLANT HEART RECIPIENT;  Surgeon: Dewayne House MD;  [facility]       Family History   Problem Relation Age of Onset     Endometrial Cancer Mother      Osteoporosis Mother      Hypertension Father      Endometrial Cancer Maternal Grandmother      Hip fracture No family hx of       Nephrolithiasis No family hx of        Social History     Tobacco Use     Smoking status: Former     Packs/day: 1.00     Years: 25.00     Pack years: 25.00     Types: Cigarettes     Start date: 1980     Quit date: 2008     Years since quittin.3     Smokeless tobacco: Never   Substance Use Topics     Alcohol use: Yes     Alcohol/week: 1.0 - 2.0 standard drink     Types: 1 - 2 Cans of beer per week       MEDICATIONS:  Current Outpatient Medications   Medication Sig Dispense Refill     acetaminophen (TYLENOL) 325 MG tablet Take 2 tablets (650 mg) by mouth every 4 hours as needed for mild pain       albuterol (PROAIR HFA/PROVENTIL HFA/VENTOLIN HFA) 108 (90 Base) MCG/ACT inhaler Inhale 2 puffs into the lungs every 6 hours as needed for shortness of breath or wheezing 18 g 5     amLODIPine (NORVASC) 5 MG tablet TAKE 2 TABLETS BY MOUTH EVERY DAY 60 tablet 11     aspirin (ASA) 81 MG chewable tablet Take 1 tablet (81 mg) by mouth daily       azaTHIOprine (IMURAN) 50 MG tablet Take 2 tablets (100 mg) by mouth daily 180 tablet 3     calcium carbonate 600 mg-vitamin D 400 units (CALTRATE) 600-400 MG-UNIT per tablet Take 1 tablet by mouth 2 times daily (with meals)       cetirizine (ZYRTEC) 10 MG tablet Take 10 mg by mouth daily       DULoxetine (CYMBALTA) 20 MG capsule Take 1 capsule (20 mg) by mouth daily 90 capsule 3     fluticasone (FLOVENT HFA) 220 MCG/ACT inhaler Inhale 2 puffs into the lungs 2 times daily 12 g 3     levothyroxine (SYNTHROID/LEVOTHROID) 75 MCG tablet Take 1 tablet (75 mcg) by mouth daily 90 tablet 3     losartan (COZAAR) 25 MG tablet Take 3 tablets (75 mg) by mouth daily 270 tablet 3     melatonin 3 MG tablet Take 2 tablets (6 mg) by mouth At Bedtime (Patient taking differently: Take 3 mg by mouth nightly as needed)       multivitamin w/minerals (THERA-VIT-M) tablet Take 1 tablet by mouth daily       omeprazole (PRILOSEC) 40 MG DR capsule Take 1 capsule (40 mg) by mouth daily 90 capsule 3      "RESTASIS 0.05 % ophthalmic emulsion Place 1 drop into both eyes 2 times daily       rosuvastatin (CRESTOR) 40 MG tablet Take 1 tablet (40 mg) by mouth daily 90 tablet 3     sirolimus (GENERIC EQUIVALENT) 1 MG tablet TAKE 4 TABLETS BY MOUTH  DAILY 120 tablet 11       EXAM:   /89 (BP Location: Right arm, Patient Position: Sitting, Cuff Size: Adult Regular)   Pulse 101   Ht 1.708 m (5' 7.24\")   Wt 86.3 kg (190 lb 4.8 oz)   SpO2 98%   BMI 29.59 kg/m    General: appears comfortable, alert and interactive, in no acute distress  Head: normocephalic, atraumatic  Eyes: anicteric sclera, EOMI  Mouth: wearing mask per COVID-19 protocol  Neck: supple, no cervical adenopathy  CV: regular rate and rhythm, S1/S2, no murmur, gallop, rub, estimated JVP ~7 cm  Resp: clear, no rales or wheezing  GI: soft, nontender, nondistended, +BS  Extremities: warm, no peripheral edema, 2+ bilateral radial pulses  Neurological: normal speech and affect, no gross motor deficits  Psych: normal mood and affect  Derm: no rashes or lesions on exposed surfaces    I personally reviewed recent labs and data as below and discussed the results with the patient in clinic today.  Labs:  CBC RESULTS:  Lab Results   Component Value Date    WBC 3.1 (L) 02/23/2023    WBC 5.0 04/09/2021    RBC 4.04 (L) 02/23/2023    RBC 4.61 04/09/2021    HGB 10.5 (L) 02/23/2023    HGB 11.3 (L) 02/23/2023    HGB 11.2 (A) 04/09/2021    HCT 34.3 (L) 02/23/2023    HCT 34.6 04/09/2021    MCV 85 02/23/2023    MCV 75 04/09/2021    MCH 28.0 02/23/2023    MCH 24.3 04/09/2021    MCHC 32.9 02/23/2023    MCHC 32.4 04/09/2021    RDW 14.8 02/23/2023    RDW 14.9 04/09/2021     02/23/2023     04/09/2021       CMP RESULTS:  Lab Results   Component Value Date     02/23/2023     03/17/2021    POTASSIUM 4.7 02/23/2023    POTASSIUM 4.4 05/28/2022    POTASSIUM 3.9 03/17/2021    CHLORIDE 107 02/23/2023    CHLORIDE 107 05/28/2022    CHLORIDE 106 03/17/2021    CO2 19 " (L) 02/23/2023    CO2 24 05/28/2022    CO2 24 03/17/2021    ANIONGAP 13 02/23/2023    ANIONGAP 6 05/28/2022    ANIONGAP 8 03/17/2021     (H) 02/23/2023    GLC 97 05/28/2022    GLC 85 03/17/2021    BUN 31.5 (H) 02/23/2023    BUN 31 (H) 05/28/2022    BUN 32 (H) 03/17/2021    CR 1.93 (H) 02/23/2023    CR 1.80 (H) 03/17/2021    GFRESTIMATED 39 (L) 02/23/2023    GFRESTIMATED 34 (L) 06/22/2021    GFRESTIMATED 41 (L) 03/17/2021    GFRESTBLACK 41 (L) 06/22/2021    GFRESTBLACK 47 (L) 03/17/2021    RADAMES 9.0 02/23/2023    RADAMES 9.0 03/17/2021    BILITOTAL 0.3 02/23/2023    BILITOTAL 0.5 03/17/2021    ALBUMIN 4.2 02/23/2023    ALBUMIN 3.2 (L) 02/23/2022    ALBUMIN 3.6 03/17/2021    ALKPHOS 92 02/23/2023    ALKPHOS 131 03/17/2021    ALT 25 02/23/2023    ALT 49 03/17/2021    AST 23 02/23/2023    AST 48 (H) 03/17/2021        INR RESULTS:  Lab Results   Component Value Date    INR 1.17 (H) 09/23/2019       Lab Results   Component Value Date    MAG 1.9 02/23/2023    MAG 1.6 03/17/2021     Lab Results   Component Value Date    NTBNPI 767 09/23/2019     Lab Results   Component Value Date    NTBNP 10,986 (H) 11/15/2018     TTE 2/23/23  Interpretation Summary  Left ventricular size, wall motion and function are normal. The ejection  fraction is 55-60%.  On direct comparison, the global right ventricular function appears mildly  reduced compared to 2022, but TAPSE and S' values are similar.  No significant valve abnormalities.  The inferior vena cava is normal.  No pericardial effusion.    Coronary Angiogram/RHC 2/23/23  Coronary Findings    Diagnostic  Dominance: Right  Left Main   The vessel is large. There was 0% vessel disease.      Left Anterior Descending   The vessel is large. There was 0% vessel disease.      Left Circumflex   The vessel is moderate in size. There was 0% vessel disease.      First Obtuse Marginal Branch   The vessel is large.      Right Coronary Artery   The vessel is large. There was 0% vessel disease.          Intervention     No interventions have been documented.     Hemodynamics    Right Heart Catheterization:  /89/114 mmHg  HR 92 BPM    RA 7/11/7 mmHg  RV 40/7 mmHg  PA 40/19/28 mmHg  PCW 17/24/17 mmHg  Jaden CO 7.13 L/min Normal = 4.0-8.0 L/min  Jaden CI 3.64 L/min/m2 Normal = 2.5-4.0 L/min/m2  TD CO 6.93 L/min Normal = 4.0-8.0 L/min  TD CI 3.54 L/min/m2 Normal = 2.5-4.0 L/min/m2  PA sat 72.4%   Hgb 10.7 g/dL   PVR 1.71 Woods units  SVR 1021 dynes-sec/cm5     ECG 2/23/23 shows sinus rhythm with nonspecific intraventricular conduction block, no acute ST-T changes    RHC 2/23/22  RA: 10  RV: 31 (11)  PA: 30/14 (21)  PVR: 0.91  Jaden CO: 6.3    CI: 3.3  TDCO: 7.4       CI: 3.8     Angiogram 2/23/22  Left Main   The vessel is large. There was 0% vessel disease.   Left Anterior Descending   The vessel is large. There was 0% vessel disease.   Left Circumflex   The vessel is moderate in size. There was 0% vessel disease.   Right Coronary Artery   The vessel is large. There was 0% vessel disease.     CMR 2/23/22  Clinical history: 56-year-old man post orthotopic heart transplantation in 2019  Comparison CMR: March 2021  1. The left ventricle is normal in cavity size and wall thickness. There are no regional wall motion  abnormalities. The global systolic function is normal. The LVEF is 55%.  2. The right ventricle is normal in cavity size. The global systolic function is normal. The RVEF is 54%.   3. The left atrium is enlarged due to transplantation and the right atrium is normal in size.  4. There is no significant valvular disease.   5. There is patchy midmyocardial late gadolinium enhancement at the basal-mid RV insertion sites consistent with non ischemic fibrosis.    6. There is no ischemia on stress perfusion imaging.  7. There is no pericardial effusion.  8. There is no intracardiac thrombus.  CONCLUSIONS:   Post heart transplantation.  Normal biventricular size and function, LVEF 55 % and RVEF 54%.   No evidence  of myocardial ischemia.  No significant changes when compared to prior study from 2021    Echo 8/31/21  Interpretation Summary  Global and regional left ventricular function is normal with an EF of 60-65%.  Global right ventricular function is normal.  Pulmonary artery systolic pressure is normal.  The inferior vena cava was normal in size with preserved respiratory variability.  No pericardial effusion is present.    RHC and angiography 2/12/2020  Pressures Phase   Time  Systolic  Diastolic  Mean  A Wave  V Wave  EDP  Max dp/dt  HR    RA Pressures  10:37 AM    7 mmHg     9 mmHg     7 mmHg       95 bpm       RV Pressures  10:37 AM  30 mmHg         9 mmHg      95 bpm       PA Pressures  10:38 AM  30 mmHg     16 mmHg     20 mmHg         94 bpm       PCW Pressures  10:38 AM    12 mmHg     13 mmHg     13 mmHg       95 bpm       AO Pressures  11:00 AM  94 mmHg     72 mmHg     83 mmHg         98 bpm       Art Pressures  10:59 AM  120 mmHg     74 mmHg     91 mmHg         103 bpm       LV Pressures  10:59 AM  120 mmHg         17 mmHg      102 bpm          Time  Hb  SAT(%)  PO2  Content  PA Sat    PA  10:21 AM   71.5 %       71.5 %       Art  10:21 AM   100 %      14.28 mL/dL        Cardiac Output    Time  TDCO  TDCI  Jaden C.O.  Jaden C.I.  Jaden HR    Cardiac Output Results  10:21 AM  6.77 L/min     3.74 L/min/m2     5.68 L/min     3.13 L/min/m2            Left Main    The vessel was visualized by selective angiography and is large. There was 0% vessel disease.    Left Anterior Descending    The vessel was visualized by selective angiography and is large. There was 0% vessel disease. IVUS was performed on the LMCA and LAD vessels. The LMCA was engaged with a 6 Fr Ikari LF 3.5 GC and the patient was anticoagulated with IV UFH. A BMW was passed into the distal LAD without difficulty. The [place] IVUS was passed over the wire and manually pulled back while recording. Proximal LAD HERACLIO 0.3 Lumen 24.1 Vessel 29.8 19% area  stenosis Mid LAD HERACLIO 0.3 Lumen 10.8 Vessel 13.7 21% area stenosis IVUS was performed on the vessel. Ultrasound supply: CATH [employer] IVUS SHRT.    Left Circumflex    The vessel was visualized by selective angiography and is moderate in size. There was 0% vessel disease.    Right Coronary Artery    The vessel was visualized by selective angiography and is large. There was 0% vessel disease.    Conclusion    Mild intimal hyperplasia with up to 0.3 mm HERACLIO and 20% narrowing by area.    ECHO 12/18/2019  Interpretation Summary  Left ventricular function, chamber size, wall motion, and wall thickness are  normal.The EF is 60-65%.  Right ventricular function, chamber size, wall motion, and thickness are  normal.  No significant valvular abnormalities were noted.  Pulmonary artery systolic pressure is normal.  The inferior vena cava was normal in size with preserved respiratory  variability.  No pericardial effusion is present.      RHC and EMB 10/15/19:  Conclusion       Right sided filling pressures are normal.    Left sided filling pressures are normal.    Normal PA pressures.    Normal cardiac output level.    Successful collection of endomyocardial biopsies          Plan       Follow bedrest per protocol    Continued medical management and lifestyle modifications for cardiovascular risk factor optimizations.    Discharge today per protocol   Hemodynamics     Right Heart Pressures     Right sided filling pressures are normal.Left sided filling pressures are normal. Normal PA pressures.Normal cardiac output level.   Heart Biospy     Heart Biopsy     After informed consent patient was prepped and draped in the usual fashion. Under fluoroscopy guidance a 7 Fr angeled sheath was placed into the right internal jugular vein after local anesthesia with 1.0% lidocaine. 5.5 [place] Jawz bioptome was passed through the sheath to the right ventricular septum and 5 biopsies were obtained. The biopsy specimens were sent to  Pathology for examination. The sheath was removed and hemostasis was obtained. The patient tolerated the procedure well and there were no complications.   Pressures Phase: Baseline      Time Systolic Diastolic Mean A Wave V Wave EDP Max dp/dt HR   RA Pressures 11:49 AM   2 mmHg    3 mmHg    4 mmHg      105 bpm      RV Pressures 11:35 AM       1776 mmHg/sec        11:49 AM 22 mmHg        4 mmHg     105 bpm      PA Pressures 11:50 AM 20 mmHg    10 mmHg    14 mmHg        105 bpm      PCW Pressures 11:49 AM   6 mmHg    7 mmHg    7 mmHg      105 bpm      Blood Flow Results Phase: Baseline      Time Results Indexed Values   QP 11:35 AM 5.92 L/min    3.66 L/min/m2      QS 11:35 AM 5.92 L/min    3.66 L/min/m2      Blood Oximetry Phase: Baseline      Time Hb SAT(%) PO2 Content PA Sat   PA 11:35 AM  66.6 %      66.6 %      Art 11:35 AM  100 %     10.74 mL/dL       Cardiac Output Phase: Baseline      Time TDCO TDCI Jaden C.O. Jaden C.I. Jaden HR   Cardiac Output Results 11:35 AM 6.03 L/min    3.74 L/min/m2    5.92 L/min    3.66 L/min/m2        11:51 AM 6.03 L/min          Resistance Results Phase: Baseline      Time PVR SVR PVR-I SVR-I TPR TVR TPR-I TVR-I PVR/SVR TPR/TVR   Resistance Results (Metric) 11:35 .64 dsc-5     172.25 dsc-5/m2     186.62 dsc-5     301.45 dsc-5/m2         Resistance Results (Wood) 11:35 AM 1.33 MARTIN     2.15 MARTIN/m2     2.33 MARTIN     3.77 MARTIN/m2         Stoke Volume Results Phase: Baseline      Time RVSW LVSW RVSW-I LVSW-I   Stroke Work Results 11:35 AM 9.33 gm*m     5.77 gm*m/m2         TTE 10/15/19:  Interpretation Summary  Left ventricular function, chamber size, wall motion, and wall thickness are  normal.The EF is 60-65%. No regional wall motion abnormalities are seen.  Right ventricular function, chamber size, wall motion, and thickness are  normal.  Trace tricuspid insufficiency is present. Pulmonary artery systolic pressure  is normal. The inferior vena cava was normal in size with  preserved  respiratory variability. No pericardial effusion is present.  There has been no change.    Assessment and Plan:    57 year old male with a history of ASD (s/p repair in childhood), AFib/AF (s/p ablation), NICM, diastolic dysfunction, alcohol abuse, Carrera's esophagus, ulcerative colitis, GIB (s/p splenic embolization), and hypothyroidism, now s/p OHT and bypass of persistent left SVC to right atrial appendage 2/24/19 who presents for ongoing evaluation and management.    NICM s/p OHT and bypass of persistent left SVC to right atrial appendage 2/24/19  His postoperative course was c/b shock due to RV dysfunction requiring IABP support, small pneumoperitoneum (clinically insignificant), chest wall hematoma (former ICD site, s/p evacuation and drain placement 3/31/19), HCAP/klebsiella pneumonia, and FRANKLIN (required short-term HD, now recovered). His biopsy 3/25/19 showed mild AMR1 with some staining for c4d.  Repeat biopsy 3/28/19 showed improved AMR and less reactive capillary staining, and subsequent biopsies have now shown resolution of AMR and improved capillary staining.     Today patient is euvolemic by history and exam. Overall doing well. His echo confirmed normal biventricular function. No CAV and grossly normal filling pressures with normal cardiac index/output.     Serostatus:  - CMV D+/R+  - EBV D+/R+  - Toxo D-/R-     Immunosuppression:  - Sirolimus 4 mg daily, goal 5-7, level 6.4 on 2/23/23  - Azathioprine 100 mg daily     Graft function:  - BPs: Controlled, continue losartan 75 mg daily. Instructed patient to call clinic if BP consistently > 140/90  - HRs:  Stable  - fluid status:  Euvolemic, off diuretics      PPx:  - CAV: Aspirin 81mg daily and rosuvastatin 40 mg daily (Continue to monitor HLD, if not improving with diet and exercise, will refer to lipid clinic)  - GERD:  Omeprazole (note h/o carrera's esophagus)  - Osteoporosis:  Calcium/vitamin D supplements     Routine screenings  Derm:  DUE  Dental: UTD  Colonoscopy: completed 3/8/2022  EGD: 3/8/2022, every 3 years.   Prostate:  stable  Eye: UTD  Flu/Pneumonia: UTD.   Covid: UTD, last booster in 11/2022     Ulcerative Colitis  - Followed by GI    Chronic Kidney Disease Stage III  - Follows with Renal    Chronic Sinus Infections  - Follows with ENT  - Discussed that if any surgeries were to be planned in the future, would need to switch sirolimus back to tac for wound healing    Chronic Right Shoulder Pain  - Wanting to see Ortho, but will talk to his PCP    To Do:   - SOT labs per protocol  - Needs to re-establish with Derm  - Follow up in one year with cMRI stress, happy to see sooner if any new or acute issues arise    I spent a total of 40 minutes today in chart review as well as personally reviewing recent cardiac testing and/or laboratory results, today's history and examination, and discussion and counseling with the patient.          Please do not hesitate to contact me if you have any questions/concerns.     Sincerely,     Slime Lindsay MD

## 2023-02-24 NOTE — NURSING NOTE
Chief Complaint   Patient presents with     Follow Up     Return heart transplant           Vitals were taken and medications reconciled.     Joshua Bradley, EMT   2:53 PM

## 2023-02-24 NOTE — PATIENT INSTRUCTIONS
Patient Instructions  1. Continue to keep up the good work with diet and exercise!  2. Let me know if you decide to have sinus or shoulder surgery.   3. We will repeat allomap when able.   4. Please see dermatology when able.    Next transplant clinic appointment: Next annual w/ stress cardiac MRI.   Next lab draw: 6 months unless needed.   Coordinator will call with all pending results.     Please call transplant coordinator with any questions:    Jocelynn Jerez RN BSN   Post Heart Transplant Nurse Coordinator  [facility]  Questions: [phone]

## 2023-02-26 LAB
ATRIAL RATE - MUSE: 90 BPM
DIASTOLIC BLOOD PRESSURE - MUSE: NORMAL MMHG
INTERPRETATION ECG - MUSE: NORMAL
P AXIS - MUSE: 83 DEGREES
PR INTERVAL - MUSE: 196 MS
QRS DURATION - MUSE: 142 MS
QT - MUSE: 378 MS
QTC - MUSE: 462 MS
R AXIS - MUSE: 29 DEGREES
SYSTOLIC BLOOD PRESSURE - MUSE: NORMAL MMHG
T AXIS - MUSE: 61 DEGREES
VENTRICULAR RATE- MUSE: 90 BPM

## 2023-02-28 LAB — IMMUKNOW IMMUNE CELL FUNCTION: 277 NG/ML

## 2023-03-01 DIAGNOSIS — Z94.1 TRANSPLANTED HEART (H): Primary | ICD-10-CM

## 2023-03-21 NOTE — DISCHARGE INSTRUCTIONS
[facility]                        Interventional Cardiology  Discharge Instructions   Post Heart Biopsy      AFTER YOU GO HOME:    DO drink plenty of fluids    DO resume your regular diet and medications unless otherwise instructed by your Primary Physician    Do Not scrub the procedure site vigorously    No lotion or powder to the puncture site for 3 days    CALL YOUR PRIMARY PHYSICIAN IF: You may resume all normal activity.  Monitor neck site for bleeding, swelling, or voice changes. If you notice bleeding or swelling immediately apply pressure to the site and call number below to speak with Cardiology Fellow.  If you experience any changes in your breathing you should call your doctor immediately or come to the closest Emergency Department.  Do not drive yourself.    ADDITIONAL INSTRUCTIONS: Medications: You are to resume all home medications including anticoagulation therapy unless otherwise advised by your primary cardiologist or nurse coordinator.    Follow Up: Per your primary cardiology team    If you have any questions or concerns regarding your procedure site please call [phone] at anytime and ask for Cardiology Fellow on call.  They are available 24 hours a day.  You may also contact the Cardiology Clinic after hours number at [phone].                                                       Telephone Numbers [phone] Monday-Friday 8:00 am to 4:30 pm    [phone] After 4:30 pm Monday-Friday, Weekends & Holidays  Ask for Interventional Cardiologist on call. Someone is on call 24 hours/day   [facility] toll free number [phone] Monday-Friday 8:00 am to 4:30 pm   [facility] Emergency Dept [phone]                  Overall much improved.  Did not end up taking the Zoloft.  Does not want to be on medication.  Continue therapy.  Follow-up in 3 months.

## 2023-03-27 ENCOUNTER — TELEPHONE (OUTPATIENT)
Dept: TRANSPLANT | Facility: CLINIC | Age: 58
End: 2023-03-27
Payer: COMMERCIAL

## 2023-03-27 NOTE — TELEPHONE ENCOUNTER
Call returned to patient. Pt up to date on testing. Next year he will have a cardiac MRI. No further questions or concerns at this time.

## 2023-03-27 NOTE — TELEPHONE ENCOUNTER
Patient advised and verbalized understanding .  Will call back later this month when he will be due.   Patient calling asking if you stiil need the MRI done for his 2 year checkupm as he has appts scheduled for next month, April.  I see no orders for an MRI?  Please call patient back.

## 2023-04-26 ENCOUNTER — OFFICE VISIT (OUTPATIENT)
Dept: DERMATOLOGY | Facility: CLINIC | Age: 58
End: 2023-04-26
Payer: COMMERCIAL

## 2023-04-26 ENCOUNTER — OFFICE VISIT (OUTPATIENT)
Dept: INTERNAL MEDICINE | Facility: CLINIC | Age: 58
End: 2023-04-26
Payer: COMMERCIAL

## 2023-04-26 VITALS
TEMPERATURE: 98 F | SYSTOLIC BLOOD PRESSURE: 128 MMHG | DIASTOLIC BLOOD PRESSURE: 78 MMHG | HEIGHT: 67 IN | HEART RATE: 112 BPM | BODY MASS INDEX: 29.51 KG/M2 | OXYGEN SATURATION: 98 % | RESPIRATION RATE: 16 BRPM | WEIGHT: 188 LBS

## 2023-04-26 DIAGNOSIS — N52.9 ERECTILE DYSFUNCTION, UNSPECIFIED ERECTILE DYSFUNCTION TYPE: ICD-10-CM

## 2023-04-26 DIAGNOSIS — Z23 NEED FOR SHINGLES VACCINE: ICD-10-CM

## 2023-04-26 DIAGNOSIS — L70.0 ACNE VULGARIS: Primary | ICD-10-CM

## 2023-04-26 DIAGNOSIS — K22.70 BARRETT'S ESOPHAGUS WITHOUT DYSPLASIA: ICD-10-CM

## 2023-04-26 DIAGNOSIS — E03.9 HYPOTHYROIDISM, UNSPECIFIED TYPE: ICD-10-CM

## 2023-04-26 DIAGNOSIS — Z95.811 HEART REPLACED (H): ICD-10-CM

## 2023-04-26 DIAGNOSIS — M85.80 OSTEOPENIA, UNSPECIFIED LOCATION: ICD-10-CM

## 2023-04-26 DIAGNOSIS — I10 ESSENTIAL HYPERTENSION: Primary | ICD-10-CM

## 2023-04-26 DIAGNOSIS — D22.9 MULTIPLE BENIGN NEVI: ICD-10-CM

## 2023-04-26 DIAGNOSIS — D18.01 CHERRY ANGIOMA: ICD-10-CM

## 2023-04-26 DIAGNOSIS — N18.30 CHRONIC RENAL INSUFFICIENCY, STAGE 3 (MODERATE) (H): ICD-10-CM

## 2023-04-26 DIAGNOSIS — J45.20 MILD INTERMITTENT ASTHMA WITHOUT COMPLICATION: ICD-10-CM

## 2023-04-26 DIAGNOSIS — K51.90 ULCERATIVE COLITIS WITHOUT COMPLICATIONS, UNSPECIFIED LOCATION (H): ICD-10-CM

## 2023-04-26 DIAGNOSIS — L82.1 SEBORRHEIC KERATOSIS: ICD-10-CM

## 2023-04-26 DIAGNOSIS — L81.4 SOLAR LENTIGO: ICD-10-CM

## 2023-04-26 PROCEDURE — 99214 OFFICE O/P EST MOD 30 MIN: CPT | Mod: GC | Performed by: DERMATOLOGY

## 2023-04-26 PROCEDURE — 99214 OFFICE O/P EST MOD 30 MIN: CPT | Performed by: INTERNAL MEDICINE

## 2023-04-26 RX ORDER — TADALAFIL 10 MG/1
10 TABLET ORAL DAILY PRN
Qty: 10 TABLET | Refills: 3 | Status: SHIPPED | OUTPATIENT
Start: 2023-04-26

## 2023-04-26 RX ORDER — CLINDAMYCIN PHOSPHATE 10 MG/G
GEL TOPICAL
Qty: 60 G | Refills: 11 | Status: SHIPPED | OUTPATIENT
Start: 2023-04-26

## 2023-04-26 ASSESSMENT — PAIN SCALES - GENERAL: PAINLEVEL: NO PAIN (0)

## 2023-04-26 NOTE — NURSING NOTE
Dermatology Rooming Note    Everton Larios's goals for this visit include:   Chief Complaint   Patient presents with     Skin Check     FBBE: no areas of concern     Carole Troncoso LPN

## 2023-04-26 NOTE — PROGRESS NOTES
[facility] Dermatology Note  Encounter Date: Apr 26, 2023  Office Visit     Dermatology Problem List:  1. Hypertrophic scars, L dorsal hand, bilateral forearms  2. Benign nevi, back, arms, face  3. Seborrheic keratoses  4. Organ transplant patient on immunosuppression with tacrolimus and sirolimus. At increased risk for squamous cell carcinoma and melanoma.  5. Acne vulgaris    ____________________________________________    Assessment & Plan:     #History of organ transplant and family history of skin cancer   Heart transplant 2019. Currently on azothioprine and sirolimus. No concerning skin findings today. Discussed increased risk of cutaneous malignancy  - ABCDEs: Counseled ABCDEs of melanoma: Asymmetry, Border (irregularity), Color (not uniform, changes in color), Diameter (greater than 6 mm which is about the size of a pencil eraser), and Evolving (any changes in preexisting moles).  - Sun protection: Counseled SPF30+ sunscreen, UPF clothing, sun avoidance, tanning bed avoidance.   - Follow-up in 1 year for FBSE or earlier as needed.     # Acne vulgaris. Chronic, flare.   Typically with outbreaks on his face. Using gentle cleanser at home. Will start topical clindamycin 1% gel as needed for spot treatments.   - Continue gentle facial cleanser    # Benign lesions: Multiple benign nevi, solar lentigos, seborrheic keratoses, cherry angiomas. Explained to patient benign nature of lesion. No treatment is necessary at this time unless the lesion changes or becomes symptomatic.   - ABCDs of melanoma were discussed and self skin checks were advised.  - Sun precaution was advised including the use of sun screens of SPF 30 or higher, sun protective clothing, and avoidance of tanning beds.    Procedures Performed:   None     Follow-up: 1 year(s) in-person, or earlier for new or changing lesions    Staff and Resident:     Saud Monge MD PGY-2  [facility]     Staff Physician  Comments:   I saw and evaluated the patient with the resident and I agree with the assessment and plan.  I was present for the examination.    Magan Hinson MD  Pronouns: he/him/his    Department of Dermatology  [facility]: Phone: [phone], Fax:[phone]  [facility]: Phone: [phone] Fax: [phone]  ____________________________________________    CC: Skin Check (FBBE: no areas of concern)    HPI:  Mr. Everton Larios is a(n) 58 year old male who presents today as a return patient for FBSE. History of heart transplant 2/24/2019 in the setting of congenital heart defect. Currently on azathioprine and sirolimus.  Last seen in derm clinic by Dr Bairon Davalos in July, 2021.     Has a couple moles on his face that will bleed if cut by razor.  Has had these moles as long as he can remember. Has acne on face that comes and goes. Worsens if he does not wash his face after work for a few days. Noticed ance outbreaks have become more frequent/severe since transplant.     Still working as a . Wears sunscreen    Family history of skin cancer   - Mother - Possibly melanoma    Patient is otherwise feeling well, without additional skin concerns.    Labs Reviewed:  N/A    Physical Exam:  Vitals: There were no vitals taken for this visit.  SKIN: Full skin, which includes the head/face, both arms, chest, back, abdomen,both legs, genitalia and/or groin buttocks, digits and/or nails, was examined.  - There are dome shaped bright red papules on the back.   - multiple skin colored papules on the face  Multiple regular brown pigmented macules and papules are identified on the face back, arms.   Scattered brown macules on sun exposed areas..   - Hypopigmented smooth white plaques with angulated borders on the left dorsal hand, wrists, and forearm.   - No other lesions of concern on areas  examined.     Medications:  Current Outpatient Medications   Medication     acetaminophen (TYLENOL) 325 MG tablet     albuterol (PROAIR HFA/PROVENTIL HFA/VENTOLIN HFA) 108 (90 Base) MCG/ACT inhaler     amLODIPine (NORVASC) 5 MG tablet     aspirin (ASA) 81 MG chewable tablet     azaTHIOprine (IMURAN) 50 MG tablet     calcium carbonate 600 mg-vitamin D 400 units (CALTRATE) 600-400 MG-UNIT per tablet     cetirizine (ZYRTEC) 10 MG tablet     DULoxetine (CYMBALTA) 20 MG capsule     fluticasone (FLOVENT HFA) 220 MCG/ACT inhaler     levothyroxine (SYNTHROID/LEVOTHROID) 75 MCG tablet     losartan (COZAAR) 25 MG tablet     melatonin 3 MG tablet     multivitamin w/minerals (THERA-VIT-M) tablet     omeprazole (PRILOSEC) 40 MG DR capsule     RESTASIS 0.05 % ophthalmic emulsion     rosuvastatin (CRESTOR) 40 MG tablet     sirolimus (GENERIC EQUIVALENT) 1 MG tablet     No current facility-administered medications for this visit.      Past Medical History:   Patient Active Problem List   Diagnosis     H/O congenital atrial septal defect (ASD) repair at age 3     Ulcerative colitis (H)     Hypothyroidism due to medication     DJD (degenerative joint disease) - neck     Pierce's esophagus     Intermittent asthma without complication     Chronic rhinitis     Depression     Iron deficiency anemia     Heart replaced by transplant (H)     Debility     Abnormal white blood cell (WBC) count     Sepsis due to Pseudomonas aeruginosa (H)     Pulmonary nodules     Compression fracture of thoracic vertebra (H)     Central line clotted (H)     FRANKLIN (acute kidney injury) (H)     Transplanted heart (H)     C. difficile diarrhea     Encounter for therapeutic drug monitoring     Age-related osteoporosis with current pathological fracture with routine healing, subsequent encounter     Low TSH level     History of corticosteroid therapy     Status post coronary angiogram     Vasculopathy of cardiac allograft (H)     Chronic kidney disease, stage 3b  (H)     Past Medical History:   Diagnosis Date     Alcohol abuse      Arthritis     hands, neck     ASD (atrial septal defect)     s/p repair age 3     Asthma      Atrial fibrillation (H)      Pierce's esophagus 10/4/2018     Pierce's esophagus      Cataract      CHF (congestive heart failure) (H)      Chronic rhinitis 10/4/2018     Chronic systolic heart failure (H) 10/4/2018     Clotting disorder (H)      Congenital cardiomyopathy in  (H)      Depression 10/4/2018     Depression      Diastolic dysfunction      DJD (degenerative joint disease)     neck     DJD (degenerative joint disease) - neck 10/4/2018     H/O congenital atrial septal defect (ASD) repair at age 3 10/4/2018     History of anesthesia complications     nausea     History of transfusion      Hypothyroidism      Hypothyroidism due to medication 10/4/2018     ICD (implantable cardioverter-defibrillator) in place      ICD, [employer] ; gen change 2018 10/4/2018     Intermittent asthma without complication 10/4/2018     Nonischemic cardiomyopathy (H) 10/4/2018     On amiodarone therapy 10/4/2018     Pacemaker      Paroxysmal atrial fibrillation (H) 10/4/2018     S/P ablation of atrial fibrillation 10/4/2018     Systolic heart failure (H)      Ulcerative colitis (H)      Ulcerative colitis (H)      Ventricular tachycardia (H) 10/4/2018     Ventricular tachycardia (H)

## 2023-04-26 NOTE — PROGRESS NOTES
Everton Larios   58 year old male    Date of Visit: 4/26/2023    Chief Complaint   Patient presents with     Follow Up     Subjective  Cardiac transplant patient for congenital heart abnormality, and doing well.  Saw cardiology in February with no change in treatment plan.    He has chronic renal insufficiency after the transplant with a creatinine stable at 1.9 when measured in February.  Saw nephrology in March.    Has had no edema issues.  No increasing shortness of breath.    Blood pressure has been well controlled on amlodipine 10 mg a day and losartan 75 mg a day.    Cardiac MRI February 2022 showed no ischemia.  His transplanted heart has some mild coronary disease.    He is on Crestor 40 mg a day and aspirin.    Normal liver test in February.    No epigastric pain or bleeding.    Mild intermittent asthma without flare.  On Flovent.    Heartburn well controlled on Prilosec.  No swallowing difficulty.  EGD March 2022 was negative for dysplasia.  He gets occasional coughing spells that may be reflux but not severe.    February 2023 liver MRI showed decrease size of hepatic cyst consistent with benign hepatic cyst.  No fatty liver or cirrhosis.    Dysthymia is well controlled.  Death of father a few years ago.  He is doing quite well on Cymbalta 20 mg a day.  He is in a new relationship at this time and thinking about intercourse again.  He does have a history of erectile dysfunction and is requesting Cialis.  He has not used that type of medicine before.    No flare of his ulcerative colitis.  He March 2022 colonoscopy was negative for polyp.    Hypothyroid without missed doses of Synthroid and normal TSH in February.    I did review the DEXA scan from October 2022 with a spine score -0.4.  Femur score -1.7/-2.1 and forearm score -2.7.  He has not been on alendronate with his renal insufficiency.  No falls.  He is walking regularly and active.    No new cough or respiratory symptoms.    PSA level 0.7 in  February of this year.    Saw dermatology today and there was no skin cancers or concerns, that was for his yearly skin exam, as he is on Imuran.    PMHx:    Past Medical History:   Diagnosis Date     Alcohol abuse      Arthritis     hands, neck     ASD (atrial septal defect)     s/p repair age 3     Asthma      Atrial fibrillation (H)      Pierce's esophagus 10/4/2018     Pierce's esophagus      Cataract      CHF (congestive heart failure) (H)      Chronic rhinitis 10/4/2018     Chronic systolic heart failure (H) 10/4/2018     Clotting disorder (H)      Congenital cardiomyopathy in  (H)      Depression 10/4/2018     Depression      Diastolic dysfunction      DJD (degenerative joint disease)     neck     DJD (degenerative joint disease) - neck 10/4/2018     H/O congenital atrial septal defect (ASD) repair at age 3 10/4/2018     History of anesthesia complications     nausea     History of transfusion      Hypothyroidism      Hypothyroidism due to medication 10/4/2018     ICD (implantable cardioverter-defibrillator) in place      ICD, Warrantly ; gen change 2018 10/4/2018     Intermittent asthma without complication 10/4/2018     Nonischemic cardiomyopathy (H) 10/4/2018     On amiodarone therapy 10/4/2018     Pacemaker      Paroxysmal atrial fibrillation (H) 10/4/2018     S/P ablation of atrial fibrillation 10/4/2018     Systolic heart failure (H)      Ulcerative colitis (H)      Ulcerative colitis (H)      Ventricular tachycardia (H) 10/4/2018     Ventricular tachycardia (H)      PSHx:    Past Surgical History:   Procedure Laterality Date     ABLATION OF DYSRHYTHMIC FOCUS      for A fib     AICD, DUAL CHAMBER       ASD REPAIR  1968     BRONCHOSCOPY (RIGID OR FLEXIBLE), DIAGNOSTIC N/A 2019    Procedure: BRONCHOSCOPY, WITH BAL;  Surgeon: Margot Chiang MD;  Location:  GI     CARDIAC DEFIBRILLATOR PLACEMENT      x2--last was 2018     COLONOSCOPY N/A 2020     Procedure: COLONOSCOPY, WITH POLYPECTOMY AND BIOPSY;  Surgeon: Ranjeet Cooper MD;  Location:  GI     COLONOSCOPY N/A 02/05/2015    Procedure: COLONOSCOPY with biopsy;  Surgeon: Fernie Akers MD;  Location: [facility];  Service:      COLONOSCOPY N/A 12/19/2017    Procedure: COLONOSCOPY with biopsies and polypectomies using snare;  Surgeon: Gael Romero MD;  Location: [facility];  Service:      COLONOSCOPY N/A 3/8/2022    Procedure: COLONOSCOPY, WITH POLYPECTOMY AND BIOPSY;  Surgeon: Paddy Saldivar DO;  Location:  GI     CV CORONARY ANGIOGRAM N/A 02/12/2020    Procedure: CV CORONARY ANGIOGRAM;  Surgeon: Isiah Mcallister MD;  Location:  HEART CARDIAC CATH LAB     CV CORONARY ANGIOGRAM N/A 03/17/2021    Procedure: CV CORONARY ANGIOGRAM;  Surgeon: Santino Mckeon MD;  [facility]     CV CORONARY ANGIOGRAM N/A 2/23/2022    Procedure: CV CORONARY ANGIOGRAM;  Surgeon: Yves Rodrigues MD;  [facility]     CV CORONARY ANGIOGRAM N/A 2/23/2023    Procedure: Coronary Angiogram;  Surgeon: Santino Mckeon MD;  [facility]     CV HEART BIOPSY N/A 03/04/2019    Procedure: Heart Biopsy;  Surgeon: Abhijeet Titus MD;  [facility]     CV HEART BIOPSY N/A 03/25/2019    Procedure: Heart Biopsy;  Surgeon: Yves Rodrigues MD;  [facility]     CV HEART BIOPSY N/A 03/28/2019    Procedure: Heart Cath Heart Biopsy;  Surgeon: Yves Rodrigues MD;  [facility]     CV HEART BIOPSY N/A 04/10/2019    Procedure: CV HEART BIOPSY;  Surgeon: Isiah Mcallister MD;  [facility]     CV HEART BIOPSY N/A 04/24/2019    Procedure: CV HEART BIOPSY;  Surgeon: Isiah Mcallister MD;  [facility]     CV HEART BIOPSY N/A 05/08/2019    Procedure: CV HEART BIOPSY;  Surgeon: Isiah Mcallister MD;  Location: [facility]  CARDIAC CATH LAB     CV HEART BIOPSY N/A 06/04/2019    Procedure: CV HEART BIOPSY;  Surgeon: Alton Solomon MD;  Location:  HEART CARDIAC CATH LAB     CV HEART BIOPSY N/A 05/22/2019    Procedure: CV HEART BIOPSY;  Surgeon: Yves Rodrigues MD;  Location:  HEART CARDIAC CATH LAB     CV HEART BIOPSY N/A 07/17/2019    Procedure: CV HEART BIOPSY;  Surgeon: Isiah Mcallister MD;  Location:  HEART CARDIAC CATH LAB     CV HEART BIOPSY N/A 08/13/2019    Procedure: CV HEART BIOPSY;  Surgeon: Jackson Patten MD;  Location:  HEART CARDIAC CATH LAB     CV HEART BIOPSY N/A 10/15/2019    Procedure: CV HEART BIOPSY;  Surgeon: Santino Mckeon MD;  Location:  [facility] CARDIAC CATH LAB     CV HEART BIOPSY N/A 02/12/2020    Procedure: CV HEART BIOPSY;  Surgeon: Isiah Mcallister MD;  Location:  [facility] CARDIAC CATH LAB     CV HEART BIOPSY N/A 05/05/2020    Procedure: CV HEART BIOPSY;  Surgeon: Yves Rodrigues MD;  Location:  HEART CARDIAC CATH LAB     CV HEART BIOPSY N/A 03/17/2021    Procedure: CV HEART BIOPSY;  Surgeon: Santino Mckeon MD;  Location:  [facility]ART CARDIAC CATH LAB     CV HEART BIOPSY N/A 5/10/2022    Procedure: Heart Biopsy;  Surgeon: Yves Rodrigues MD;  Location:  HEART CARDIAC CATH LAB     CV INTRA AORTIC BALLOON N/A 02/18/2019    Procedure: Intra-Aortic Balloon;  Surgeon: Alton Solomon MD;  Location:  HEART CARDIAC CATH LAB     CV INTRA AORTIC BALLOON N/A 02/20/2019    Procedure: Replace subclavian IABP;  Surgeon: Yves Rodrigues MD;  Location:  HEART CARDIAC CATH LAB     CV INTRAVASULAR ULTRASOUND N/A 02/12/2020    Procedure: CV INTRAVASCULAR ULTRASOUND;  Surgeon: Isiah Mcallister MD;  Location:  HEART CARDIAC CATH LAB     CV LEFT HEART CATH N/A 03/19/2019    Procedure: Left Heart Cath;  Surgeon: Yves Rodrigues MD;  Location:  [facility] CARDIAC CATH LAB     CV LEFT HEART CATH N/A 02/12/2020    Procedure: Left Heart Cath;  Surgeon: Esvin  Isiah Mensah MD;  [facility]     CV MYOCARDIAL BIOPSY N/A 03/19/2019    Procedure: RHC/HBx - Femoral access for 3/19 per Nimisha GONZALEZ;  Surgeon: Yves Rodrigues MD;  Location:  [facility] CARDIAC CATH LAB     CV MYOCARDIAL BIOPSY N/A 03/11/2019    Procedure: ADD ON RHC/HBX;  Surgeon: Alton Solomon MD;  Location:  HEART CARDIAC CATH LAB     CV RIGHT HEART CATH MEASUREMENTS RECORDED N/A 03/25/2019    Procedure: Right Heart Cath;  Surgeon: Yves Rodrigues MD;  [facility]     CV RIGHT HEART CATH MEASUREMENTS RECORDED N/A 03/28/2019    Procedure: Heart Cath Right Heart Cath;  Surgeon: Yves Rodrigues MD;  Location:  HE[facility] CARDIAC CATH LAB     CV RIGHT HEART CATH MEASUREMENTS RECORDED N/A 04/24/2019    Procedure: CV RIGHT HEART CATH;  Surgeon: Isiah Mcallister MD;  Location:  HEART CARDIAC CATH LAB     CV RIGHT HEART CATH MEASUREMENTS RECORDED N/A 06/04/2019    Procedure: CV RIGHT HEART CATH;  Surgeon: Alton Solomon MD;  Location:  HEART CARDIAC CATH LAB     CV RIGHT HEART CATH MEASUREMENTS RECORDED N/A 05/22/2019    Procedure: CV RIGHT HEART CATH;  Surgeon: Yves Rodrigues MD;  Location:  HE[facility] CARDIAC CATH LAB     CV RIGHT HEART CATH MEASUREMENTS RECORDED N/A 08/13/2019    Procedure: CV RIGHT HEART CATH;  Surgeon: Jackson Patten MD;  Location:  HEART CARDIAC CATH LAB     CV RIGHT HEART CATH MEASUREMENTS RECORDED N/A 10/15/2019    Procedure: CV RIGHT HEART CATH;  Surgeon: Santino Mckeon MD;  Location:  HEART CARDIAC CATH LAB     CV RIGHT HEART CATH MEASUREMENTS RECORDED N/A 02/12/2020    Procedure: CV RIGHT HEART CATH;  Surgeon: Isiah Mcallister MD;  Location:  HEART CARDIAC CATH LAB     CV RIGHT HEART CATH MEASUREMENTS RECORDED N/A 03/17/2021    Procedure: CV RIGHT HEART CATH;  Surgeon: Santino Mckeon MD;  Location:  HEART CARDIAC CATH LAB     CV RIGHT HEART CATH MEASUREMENTS RECORDED N/A 2/23/2022     Procedure: CV RIGHT HEART CATH;  Surgeon: Yves Rodrigues MD;  Location:  HEART CARDIAC CATH LAB     CV RIGHT HEART CATH MEASUREMENTS RECORDED N/A 5/10/2022    Procedure: Right Heart Catheterization;  Surgeon: Yves Rodrigues MD;  Location:  HEART CARDIAC CATH LAB     CV RIGHT HEART CATH MEASUREMENTS RECORDED N/A 2/23/2023    Procedure: Right Heart Catheterization;  Surgeon: Sanitno Mckeon MD;  Location:  HEART CARDIAC CATH LAB     ESOPHAGOSCOPY, GASTROSCOPY, DUODENOSCOPY (EGD), COMBINED N/A 12/19/2017    Procedure: ESOPHAGOGASTRODUODENOSCOPY (EGD) with biopsies;  Surgeon: Gael Romero MD;  Location: [facility];  Service:      ESOPHAGOSCOPY, GASTROSCOPY, DUODENOSCOPY (EGD), COMBINED N/A 3/8/2022    Procedure: ESOPHAGOGASTRODUODENOSCOPY, WITH BIOPSY;  Surgeon: Paddy Saldivar DO;  Location:  GI     H STATISTIC PICC LINE INSERTION >5YR, FAILED Bilateral 10/28/2021    L sided SVC     HAND SURGERY Left      HC REVISE MEDIAN N/CARPAL TUNNEL SURG  09/21/2016    Procedure: OPEN RIGHT CARPAL TUNNEL RELEASE CORTISONE INJECTION RIGHT THUMB;  Surgeon: Duong Santoyo MD;  Location: [facility] OR;  Service: Orthopedics     INCISION AND DRAINAGE CHEST WASHOUT, COMBINED N/A 03/21/2019    Procedure: Incision And Drainage; Evacuation Right Chest Wall Hematoma;  Surgeon: Dewayne House MD;  [facility]     INSERT INTRAAORTIC BALLOON PUMP Left 02/19/2019    Procedure: Insert left  Subclavian Balloon Pump,  Removal Right femoral arterial balloom pump sheath;  Surgeon: Dewayne House MD;  [facility]     INSERT INTRAAORTIC BALLOON PUMP Left 02/21/2019    Procedure: SUBCLAVIAN BALLOON PUMP PLACEMENT;  Surgeon: Ben White MD;  [facility]     INSERT INTRACORONARY STENT      x2     IR PICC PLACEMENT > 5 YRS OF AGE  05/08/2019     TRANSPLANT HEART RECIPIENT N/A 02/24/2019    Procedure: TRANSPLANT HEART RECIPIENT;  Surgeon: Dewayne House MD;  Location:  OR  "    Immunizations:   Immunization History   Administered Date(s) Administered     COVID-19 Vaccine 12+ (Pfizer 2022) 04/02/2022     COVID-19 Vaccine 12+ (Pfizer) 03/19/2021, 04/09/2021, 08/21/2021     COVID-19 Vaccine Bivalent Booster 12+ (Pfizer) 11/05/2022     Influenza (IIV3) PF 10/22/2008, 09/25/2009, 11/07/2011, 11/30/2012, 10/27/2013, 10/02/2014     Influenza Vaccine >6 months (Alfuria,Fluzone) 10/02/2015, 09/20/2019, 09/16/2020, 09/28/2021, 11/12/2022     Influenza Vaccine, 6+MO IM (QUADRIVALENT W/PRESERVATIVES) 09/12/2016, 10/16/2017, 09/20/2018, 09/20/2019     OPV, trivalent, live 10/20/1978     Pneumo Conj 13-V (2010&after) 09/26/2018     Pneumococcal 23 valent 09/28/2019     TDAP Vaccine (Boostrix) 07/20/2012     Td (Adult), Adsorbed 10/20/1978       ROS A comprehensive review of systems was performed and was otherwise negative    Medications, allergies, and problem list were reviewed and updated    Exam  /78   Pulse 112   Temp 98  F (36.7  C)   Resp 16   Ht 1.708 m (5' 7.25\")   Wt 85.3 kg (188 lb)   SpO2 98%   BMI 29.23 kg/m    Patient appears well.  Mood and affect is quite good.  Mobility is normal.  Lungs are clear without wheezing or crackles.  Heart is tachycardic, which is baseline for his transplant heart but there is no murmur or rub or gallop.  There is no ankle edema.  Abdomen is thin and nontender.    Assessment/Plan  1. Essential hypertension  Well-controlled.  Continue current amlodipine and losartan.    2. Chronic renal insufficiency, stage 3 (moderate) (H)  Stable on check in February.  Monitored by nephrology.    3. Mild intermittent asthma without complication  No flare.  Continue current inhalers including Flovent    4. Pierce's esophagus without dysplasia  Asymptomatic except for occasional reflux symptoms.  Continue Prilosec daily.    5. Ulcerative colitis without complications, unspecified location (H)  No flare.  Colonoscopy March 2022 negative for polyp.  Management " per GI  Immunosuppression with transplant  6. Hypothyroidism, unspecified type  Stable dose Synthroid with normal TSH in February    7. Osteopenia, unspecified location  Continue daily walking.  Not on treatment currently.  Plan to repeat DEXA scan October 2024    8. Need for shingles vaccine  I recommended he get the Shingrix vaccine.  He did have shingles back in 2019.  He can talk with his transplant team about this.  - ZOSTER VACCINE RECOMBINANT ADJUVANTED (SHINGRIX); Future  - ZOSTER RECOMBINANT ADJUVANTED (SHINGRIX); Future    9. Erectile dysfunction, unspecified erectile dysfunction type  Given prescription.  Patient think about starting a new relationship.  - tadalafil (CIALIS) 10 MG tablet; Take 1 tablet (10 mg) by mouth daily as needed (Erectile dysfunction)  Dispense: 10 tablet; Refill: 3    History of depression but well controlled currently.  Continue duloxetine 20 mg daily    Mild coronary disease of the transplanted heart, Crestor and aspirin daily    Status post cardiac transplant for congenital heart condition.  Cardiac MRI looked okay last year.  Saw cardiology in February without change in treatment.  Immunosuppression managed by the transplant team.      Return in about 6 months (around 10/26/2023) for Clinic follow-up appointment.   Patient Instructions   Get another bivalent COVID booster shot after May 2, that can be done at local pharmacy.    Consider getting a Shingrix shingles vaccine.  It is a series of 2 shots that are 2 to 6 months apart.  You can get that at local pharmacy as well or talk to your transplant team.  I did place future orders at this clinic if you wish to consider it, but check with your insurance first for coverage.    You can use Cialis as needed for erectile dysfunction.  Do not take nitroglycerin with Cialis.    Follow-up in 6 months for a general checkup appointment.    No change in medication treatment plan.        Fredrick Wolff MD, MD        Current Outpatient  Medications   Medication Sig Dispense Refill     acetaminophen (TYLENOL) 325 MG tablet Take 2 tablets (650 mg) by mouth every 4 hours as needed for mild pain       albuterol (PROAIR HFA/PROVENTIL HFA/VENTOLIN HFA) 108 (90 Base) MCG/ACT inhaler Inhale 2 puffs into the lungs every 6 hours as needed for shortness of breath or wheezing 18 g 5     amLODIPine (NORVASC) 5 MG tablet TAKE 2 TABLETS BY MOUTH EVERY DAY 60 tablet 11     aspirin (ASA) 81 MG chewable tablet Take 1 tablet (81 mg) by mouth daily       azaTHIOprine (IMURAN) 50 MG tablet Take 2 tablets (100 mg) by mouth daily 180 tablet 3     calcium carbonate 600 mg-vitamin D 400 units (CALTRATE) 600-400 MG-UNIT per tablet Take 1 tablet by mouth 2 times daily (with meals)       cetirizine (ZYRTEC) 10 MG tablet Take 10 mg by mouth daily       clindamycin (CLINDAMAX) 1 % external gel Apply twice daily as needed for acne on the spots 60 g 11     DULoxetine (CYMBALTA) 20 MG capsule Take 1 capsule (20 mg) by mouth daily 90 capsule 3     fluticasone (FLOVENT HFA) 220 MCG/ACT inhaler Inhale 2 puffs into the lungs 2 times daily 12 g 3     levothyroxine (SYNTHROID/LEVOTHROID) 75 MCG tablet Take 1 tablet (75 mcg) by mouth daily 90 tablet 3     losartan (COZAAR) 25 MG tablet Take 3 tablets (75 mg) by mouth daily 270 tablet 3     melatonin 3 MG tablet Take 2 tablets (6 mg) by mouth At Bedtime (Patient taking differently: Take 3 mg by mouth nightly as needed)       multivitamin w/minerals (THERA-VIT-M) tablet Take 1 tablet by mouth daily       omeprazole (PRILOSEC) 40 MG DR capsule Take 1 capsule (40 mg) by mouth daily 90 capsule 3     RESTASIS 0.05 % ophthalmic emulsion Place 1 drop into both eyes 2 times daily       rosuvastatin (CRESTOR) 40 MG tablet Take 1 tablet (40 mg) by mouth daily 90 tablet 3     sirolimus (GENERIC EQUIVALENT) 1 MG tablet TAKE 4 TABLETS BY MOUTH  DAILY 120 tablet 11     tadalafil (CIALIS) 10 MG tablet Take 1 tablet (10 mg) by mouth daily as needed  "(Erectile dysfunction) 10 tablet 3     Allergies   Allergen Reactions     Hydromorphone Other (See Comments)     Significant Delirium     Adhesive Tape Blisters     Tegaderm causes bruises, blisters and extreme irritation.     Lisinopril Other (See Comments)     hypotension     Quinolones Other (See Comments)     Would not rx quinolones until QTc interval improved.      Tobramycin Other (See Comments)     Would not use aminoglycosides as his kidney function is very borderline     Codeine Nausea     Social History     Tobacco Use     Smoking status: Former     Packs/day: 1.00     Years: 25.00     Pack years: 25.00     Types: Cigarettes     Start date: 1980     Quit date: 2008     Years since quittin.4     Smokeless tobacco: Never   Substance Use Topics     Alcohol use: Yes     Alcohol/week: 1.0 - 2.0 standard drink of alcohol     Types: 1 - 2 Cans of beer per week     Drug use: No             Subjective   Everton is a 58 year old, presenting for the following health issues:  Follow Up        2023     1:30 PM   Additional Questions   Roomed by minnie   Accompanied by rafal         2023     1:30 PM   Patient Reported Additional Medications   Patient reports taking the following new medications no     HPI             Review of Systems         Objective    /78   Pulse 112   Temp 98  F (36.7  C)   Resp 16   Ht 1.708 m (5' 7.25\")   Wt 85.3 kg (188 lb)   SpO2 98%   BMI 29.23 kg/m    Body mass index is 29.23 kg/m .  Physical Exam                       "

## 2023-04-26 NOTE — LETTER
4/26/2023       RE: Everton Larios  [address] [id number]     Dear Colleague,    Thank you for referring your patient, Everton Larios, to the [facility] [place] at [facility]. Please see a copy of my visit note below.    [facility] Dermatology Note  Encounter Date: Apr 26, 2023  Office Visit     Dermatology Problem List:  1. Hypertrophic scars, L dorsal hand, bilateral forearms  2. Benign nevi, back, arms, face  3. Seborrheic keratoses  4. Organ transplant patient on immunosuppression with tacrolimus and sirolimus. At increased risk for squamous cell carcinoma and melanoma.  5. Acne vulgaris    ____________________________________________    Assessment & Plan:     #History of organ transplant and family history of skin cancer   Heart transplant 2019. Currently on azothioprine and sirolimus. No concerning skin findings today. Discussed increased risk of cutaneous malignancy  - ABCDEs: Counseled ABCDEs of melanoma: Asymmetry, Border (irregularity), Color (not uniform, changes in color), Diameter (greater than 6 mm which is about the size of a pencil eraser), and Evolving (any changes in preexisting moles).  - Sun protection: Counseled SPF30+ sunscreen, UPF clothing, sun avoidance, tanning bed avoidance.   - Follow-up in 1 year for FBSE or earlier as needed.     # Acne vulgaris. Chronic, flare.   Typically with outbreaks on his face. Using gentle cleanser at home. Will start topical clindamycin 1% gel as needed for spot treatments.   - Continue gentle facial cleanser    # Benign lesions: Multiple benign nevi, solar lentigos, seborrheic keratoses, cherry angiomas. Explained to patient benign nature of lesion. No treatment is necessary at this time unless the lesion changes or becomes symptomatic.   - ABCDs of melanoma were discussed and self skin checks were advised.  - Sun precaution was advised including  the use of sun screens of SPF 30 or higher, sun protective clothing, and avoidance of tanning beds.    Procedures Performed:   None     Follow-up: 1 year(s) in-person, or earlier for new or changing lesions    Staff and Resident:     Saud Monge MD PGY-2  [facility]     Staff Physician Comments:   I saw and evaluated the patient with the resident and I agree with the assessment and plan.  I was present for the examination.    Magan Hinson MD  Pronouns: he/him/his    Department of Dermatology  [facility]: Phone: [phone], Fax:[phone]  [facility]: Phone: [phone] Fax: [phone]  ____________________________________________    CC: Skin Check (FBBE: no areas of concern)    HPI:  Mr. Everton Larios is a(n) 58 year old male who presents today as a return patient for FBSE. History of heart transplant 2/24/2019 in the setting of congenital heart defect. Currently on azathioprine and sirolimus.  Last seen in derm clinic by Dr Bairon Davalos in July, 2021.     Has a couple moles on his face that will bleed if cut by razor.  Has had these moles as long as he can remember. Has acne on face that comes and goes. Worsens if he does not wash his face after work for a few days. Noticed ance outbreaks have become more frequent/severe since transplant.     Still working as a . Wears sunscreen    Family history of skin cancer   - Mother - Possibly melanoma    Patient is otherwise feeling well, without additional skin concerns.    Labs Reviewed:  N/A    Physical Exam:  Vitals: There were no vitals taken for this visit.  SKIN: Full skin, which includes the head/face, both arms, chest, back, abdomen,both legs, genitalia and/or groin buttocks, digits and/or nails, was examined.  - There are dome shaped bright red papules on the back.   - multiple skin colored  papules on the face  Multiple regular brown pigmented macules and papules are identified on the face back, arms.   Scattered brown macules on sun exposed areas..   - Hypopigmented smooth white plaques with angulated borders on the left dorsal hand, wrists, and forearm.   - No other lesions of concern on areas examined.     Medications:  Current Outpatient Medications   Medication    acetaminophen (TYLENOL) 325 MG tablet    albuterol (PROAIR HFA/PROVENTIL HFA/VENTOLIN HFA) 108 (90 Base) MCG/ACT inhaler    amLODIPine (NORVASC) 5 MG tablet    aspirin (ASA) 81 MG chewable tablet    azaTHIOprine (IMURAN) 50 MG tablet    calcium carbonate 600 mg-vitamin D 400 units (CALTRATE) 600-400 MG-UNIT per tablet    cetirizine (ZYRTEC) 10 MG tablet    DULoxetine (CYMBALTA) 20 MG capsule    fluticasone (FLOVENT HFA) 220 MCG/ACT inhaler    levothyroxine (SYNTHROID/LEVOTHROID) 75 MCG tablet    losartan (COZAAR) 25 MG tablet    melatonin 3 MG tablet    multivitamin w/minerals (THERA-VIT-M) tablet    omeprazole (PRILOSEC) 40 MG DR capsule    RESTASIS 0.05 % ophthalmic emulsion    rosuvastatin (CRESTOR) 40 MG tablet    sirolimus (GENERIC EQUIVALENT) 1 MG tablet     No current facility-administered medications for this visit.      Past Medical History:   Patient Active Problem List   Diagnosis    H/O congenital atrial septal defect (ASD) repair at age 3    Ulcerative colitis (H)    Hypothyroidism due to medication    DJD (degenerative joint disease) - neck    Pierce's esophagus    Intermittent asthma without complication    Chronic rhinitis    Depression    Iron deficiency anemia    Heart replaced by transplant (H)    Debility    Abnormal white blood cell (WBC) count    Sepsis due to Pseudomonas aeruginosa (H)    Pulmonary nodules    Compression fracture of thoracic vertebra (H)    Central line clotted (H)    FRANKLIN (acute kidney injury) (H)    Transplanted heart (H)    C. difficile diarrhea    Encounter for therapeutic drug monitoring     Age-related osteoporosis with current pathological fracture with routine healing, subsequent encounter    Low TSH level    History of corticosteroid therapy    Status post coronary angiogram    Vasculopathy of cardiac allograft (H)    Chronic kidney disease, stage 3b (H)     Past Medical History:   Diagnosis Date    Alcohol abuse     Arthritis     hands, neck    ASD (atrial septal defect)     s/p repair age 3    Asthma     Atrial fibrillation (H)     Pierce's esophagus 10/4/2018    Pierce's esophagus     Cataract     CHF (congestive heart failure) (H)     Chronic rhinitis 10/4/2018    Chronic systolic heart failure (H) 10/4/2018    Clotting disorder (H)     Congenital cardiomyopathy in  (H)     Depression 10/4/2018    Depression     Diastolic dysfunction     DJD (degenerative joint disease)     neck    DJD (degenerative joint disease) - neck 10/4/2018    H/O congenital atrial septal defect (ASD) repair at age 3 10/4/2018    History of anesthesia complications     nausea    History of transfusion     Hypothyroidism     Hypothyroidism due to medication 10/4/2018    ICD (implantable cardioverter-defibrillator) in place     ICD, [employer] ; gen change 2018 10/4/2018    Intermittent asthma without complication 10/4/2018    Nonischemic cardiomyopathy (H) 10/4/2018    On amiodarone therapy 10/4/2018    Pacemaker     Paroxysmal atrial fibrillation (H) 10/4/2018    S/P ablation of atrial fibrillation 10/4/2018    Systolic heart failure (H)     Ulcerative colitis (H)     Ulcerative colitis (H)     Ventricular tachycardia (H) 10/4/2018    Ventricular tachycardia (H)

## 2023-04-27 ENCOUNTER — TELEPHONE (OUTPATIENT)
Dept: INTERNAL MEDICINE | Facility: CLINIC | Age: 58
End: 2023-04-27
Payer: COMMERCIAL

## 2023-04-27 DIAGNOSIS — N52.1 ERECTILE DYSFUNCTION DUE TO DISEASES CLASSIFIED ELSEWHERE: Primary | ICD-10-CM

## 2023-04-28 RX ORDER — SILDENAFIL 50 MG/1
50 TABLET, FILM COATED ORAL DAILY PRN
Qty: 10 TABLET | Refills: 11 | Status: SHIPPED | OUTPATIENT
Start: 2023-04-28

## 2023-04-28 NOTE — TELEPHONE ENCOUNTER
I sent in a prescription for sildenafil.  See if that goes through.  Otherwise start a prior Auth process

## 2023-05-08 DIAGNOSIS — R05.2 SUBACUTE COUGH: ICD-10-CM

## 2023-05-08 DIAGNOSIS — J45.20 INTERMITTENT ASTHMA WITHOUT COMPLICATION, UNSPECIFIED ASTHMA SEVERITY: ICD-10-CM

## 2023-05-09 RX ORDER — FLUTICASONE PROPIONATE 220 UG/1
2 AEROSOL, METERED RESPIRATORY (INHALATION) 2 TIMES DAILY
Qty: 12 G | Refills: 3 | Status: SHIPPED | OUTPATIENT
Start: 2023-05-09 | End: 2023-11-08

## 2023-05-09 NOTE — TELEPHONE ENCOUNTER
"Routing refill request to provider for review/approval because:  Labs not current:  Asthma control assessment    Last Written Prescription Date: 11/24/2022  Last Fill Quantity: 12 g,  # refills: 3  Last office visit provider: 4/26/2023    Requested Prescriptions   Pending Prescriptions Disp Refills     fluticasone (FLOVENT HFA) 220 MCG/ACT inhaler 12 g 3     Sig: Inhale 2 puffs into the lungs 2 times daily       Inhaled Steroids Protocol Failed - 5/9/2023 12:16 PM        Failed - Asthma control assessment score within normal limits in last 6 months     Please review ACT score.           Passed - Patient is age 12 or older        Passed - Medication is active on med list        Passed - Recent (6 mo) or future (30 days) visit within the authorizing provider's specialty     Patient had office visit in the last 6 months or has a visit in the next 30 days with authorizing provider or within the authorizing provider's specialty.  See \"Patient Info\" tab in inbasket, or \"Choose Columns\" in Meds & Orders section of the refill encounter.                 Bhumika Carrera RN 05/09/23 12:16 PM  "

## 2023-05-10 ENCOUNTER — TELEPHONE (OUTPATIENT)
Dept: TRANSPLANT | Facility: CLINIC | Age: 58
End: 2023-05-10
Payer: COMMERCIAL

## 2023-05-10 DIAGNOSIS — Z94.1 TRANSPLANTED HEART (H): ICD-10-CM

## 2023-05-10 RX ORDER — AZATHIOPRINE 100 MG/1
100 TABLET ORAL DAILY
Qty: 90 TABLET | Refills: 1 | Status: SHIPPED | OUTPATIENT
Start: 2023-05-10 | End: 2023-12-28

## 2023-05-10 ASSESSMENT — ENCOUNTER SYMPTOMS: NEW SYMPTOMS OF CORONARY ARTERY DISEASE: 0

## 2023-05-16 DIAGNOSIS — Z94.1 TRANSPLANTED HEART (H): ICD-10-CM

## 2023-05-18 RX ORDER — LOSARTAN POTASSIUM 25 MG/1
75 TABLET ORAL DAILY
Qty: 270 TABLET | Refills: 3 | Status: SHIPPED | OUTPATIENT
Start: 2023-05-18 | End: 2024-04-08

## 2023-05-24 ENCOUNTER — ANCILLARY PROCEDURE (OUTPATIENT)
Dept: GENERAL RADIOLOGY | Facility: CLINIC | Age: 58
End: 2023-05-24
Attending: INTERNAL MEDICINE
Payer: COMMERCIAL

## 2023-05-24 ENCOUNTER — OFFICE VISIT (OUTPATIENT)
Dept: INTERNAL MEDICINE | Facility: CLINIC | Age: 58
End: 2023-05-24
Payer: COMMERCIAL

## 2023-05-24 VITALS
RESPIRATION RATE: 18 BRPM | DIASTOLIC BLOOD PRESSURE: 78 MMHG | HEIGHT: 67 IN | BODY MASS INDEX: 29.03 KG/M2 | TEMPERATURE: 98.4 F | SYSTOLIC BLOOD PRESSURE: 120 MMHG | WEIGHT: 185 LBS | HEART RATE: 107 BPM | OXYGEN SATURATION: 98 %

## 2023-05-24 DIAGNOSIS — N18.30 CHRONIC RENAL INSUFFICIENCY, STAGE 3 (MODERATE) (H): ICD-10-CM

## 2023-05-24 DIAGNOSIS — J45.20 MILD INTERMITTENT ASTHMA, UNSPECIFIED WHETHER COMPLICATED: ICD-10-CM

## 2023-05-24 DIAGNOSIS — R05.1 ACUTE COUGH: ICD-10-CM

## 2023-05-24 DIAGNOSIS — J02.9 SORE THROAT: Primary | ICD-10-CM

## 2023-05-24 DIAGNOSIS — Z94.1 TRANSPLANTED HEART (H): ICD-10-CM

## 2023-05-24 LAB
DEPRECATED S PYO AG THROAT QL EIA: NEGATIVE
GROUP A STREP BY PCR: NOT DETECTED

## 2023-05-24 PROCEDURE — 99214 OFFICE O/P EST MOD 30 MIN: CPT | Performed by: INTERNAL MEDICINE

## 2023-05-24 PROCEDURE — 87651 STREP A DNA AMP PROBE: CPT | Performed by: INTERNAL MEDICINE

## 2023-05-24 PROCEDURE — 71046 X-RAY EXAM CHEST 2 VIEWS: CPT | Mod: TC | Performed by: RADIOLOGY

## 2023-05-24 ASSESSMENT — ASTHMA QUESTIONNAIRES
QUESTION_3 LAST FOUR WEEKS HOW OFTEN DID YOUR ASTHMA SYMPTOMS (WHEEZING, COUGHING, SHORTNESS OF BREATH, CHEST TIGHTNESS OR PAIN) WAKE YOU UP AT NIGHT OR EARLIER THAN USUAL IN THE MORNING: NOT AT ALL
ACT_TOTALSCORE: 22
QUESTION_4 LAST FOUR WEEKS HOW OFTEN HAVE YOU USED YOUR RESCUE INHALER OR NEBULIZER MEDICATION (SUCH AS ALBUTEROL): ONCE A WEEK OR LESS
QUESTION_1 LAST FOUR WEEKS HOW MUCH OF THE TIME DID YOUR ASTHMA KEEP YOU FROM GETTING AS MUCH DONE AT WORK, SCHOOL OR AT HOME: NONE OF THE TIME
ACT_TOTALSCORE: 22
QUESTION_2 LAST FOUR WEEKS HOW OFTEN HAVE YOU HAD SHORTNESS OF BREATH: ONCE OR TWICE A WEEK
QUESTION_5 LAST FOUR WEEKS HOW WOULD YOU RATE YOUR ASTHMA CONTROL: WELL CONTROLLED

## 2023-05-24 ASSESSMENT — PATIENT HEALTH QUESTIONNAIRE - PHQ9
SUM OF ALL RESPONSES TO PHQ QUESTIONS 1-9: 3
10. IF YOU CHECKED OFF ANY PROBLEMS, HOW DIFFICULT HAVE THESE PROBLEMS MADE IT FOR YOU TO DO YOUR WORK, TAKE CARE OF THINGS AT HOME, OR GET ALONG WITH OTHER PEOPLE: NOT DIFFICULT AT ALL
SUM OF ALL RESPONSES TO PHQ QUESTIONS 1-9: 3

## 2023-05-24 ASSESSMENT — ENCOUNTER SYMPTOMS: COUGH: 1

## 2023-05-24 NOTE — PROGRESS NOTES
Everton GONZALES Ric   58 year old male    Date of Visit: 2023    Chief Complaint   Patient presents with     Cough     Sore throat, runny nose, fever, back pain, congestion. X 4 days     Subjective  58-year-old male status post heart transplant for congenital heart defect with history of longstanding asthma usually well controlled on Flovent.    He was in his normal state of health when 3 days ago developed a tickle in his throat with mild sore throat.  The next day on Monday was significantly worse and developed a cough.  Yesterday more fatigued felt sick with increasing congestion and sore throat and cough.  He had a low-grade temperature of 100.5.    No increasing shortness of breath or worsening wheezing.  No chest pain.  No GI symptoms.  No rash.    No known sick contacts.    He just got his recent COVID shot in May.  He did a home COVID test that was -2 days ago    PMHx:    Past Medical History:   Diagnosis Date     Alcohol abuse      Arthritis     hands, neck     ASD (atrial septal defect)     s/p repair age 3     Asthma      Atrial fibrillation (H)      Pierce's esophagus 10/4/2018     Pierce's esophagus      Cataract      CHF (congestive heart failure) (H)      Chronic rhinitis 10/4/2018     Chronic systolic heart failure (H) 10/4/2018     Clotting disorder (H)      Congenital cardiomyopathy in  (H)      Depression 10/4/2018     Depression      Diastolic dysfunction      DJD (degenerative joint disease)     neck     DJD (degenerative joint disease) - neck 10/4/2018     H/O congenital atrial septal defect (ASD) repair at age 3 10/4/2018     History of anesthesia complications     nausea     History of transfusion      Hypothyroidism      Hypothyroidism due to medication 10/4/2018     ICD (implantable cardioverter-defibrillator) in place      ICD, [employer] ; gen change 2018 10/4/2018     Intermittent asthma without complication 10/4/2018     Nonischemic cardiomyopathy (H)  10/4/2018     On amiodarone therapy 10/4/2018     Pacemaker      Paroxysmal atrial fibrillation (H) 10/4/2018     S/P ablation of atrial fibrillation 10/4/2018     Systolic heart failure (H)      Ulcerative colitis (H) 2005     Ulcerative colitis (H)      Ventricular tachycardia (H) 10/4/2018     Ventricular tachycardia (H)      PSHx:    Past Surgical History:   Procedure Laterality Date     ABLATION OF DYSRHYTHMIC FOCUS      for A fib     AICD, DUAL CHAMBER       ASD REPAIR  01/01/1968     BRONCHOSCOPY (RIGID OR FLEXIBLE), DIAGNOSTIC N/A 04/30/2019    Procedure: BRONCHOSCOPY, WITH BAL;  Surgeon: Margot Chiang MD;  Location: [facility]     CARDIAC DEFIBRILLATOR PLACEMENT      x2--last was Feb 2018     COLONOSCOPY N/A 02/20/2020    Procedure: COLONOSCOPY, WITH POLYPECTOMY AND BIOPSY;  Surgeon: Ranjeet Cooper MD;  Location:  GI     COLONOSCOPY N/A 02/05/2015    Procedure: COLONOSCOPY with biopsy;  Surgeon: Fernie Akers MD;  Location: [facility];  Service:      COLONOSCOPY N/A 12/19/2017    Procedure: COLONOSCOPY with biopsies and polypectomies using snare;  Surgeon: Gael Romero MD;  Location: [facility];  Service:      COLONOSCOPY N/A 3/8/2022    Procedure: COLONOSCOPY, WITH POLYPECTOMY AND BIOPSY;  Surgeon: Paddy Saldivar DO;  Location:  GI     CV CORONARY ANGIOGRAM N/A 02/12/2020    Procedure: CV CORONARY ANGIOGRAM;  Surgeon: Isiah Mcallister MD;  Location:  HEART CARDIAC CATH LAB     CV CORONARY ANGIOGRAM N/A 03/17/2021    Procedure: CV CORONARY ANGIOGRAM;  Surgeon: Santino Mckeon MD;  Location:  HEART CARDIAC CATH LAB     CV CORONARY ANGIOGRAM N/A 2/23/2022    Procedure: CV CORONARY ANGIOGRAM;  Surgeon: Yves Rodrigues MD;  Location:  HEART CARDIAC CATH LAB     CV CORONARY ANGIOGRAM N/A 2/23/2023    Procedure: Coronary Angiogram;  Surgeon: Santino Mckeon MD;  Location: [facility] CARDIAC CATH LAB     CV HEART BIOPSY N/A 03/04/2019    Procedure: Heart Biopsy;  Surgeon:  Abhijeet Titus MD;  [facility]     CV HEART BIOPSY N/A 03/25/2019    Procedure: Heart Biopsy;  Surgeon: Yves Rodrigues MD;  [facility]     CV HEART BIOPSY N/A 03/28/2019    Procedure: Heart Cath Heart Biopsy;  Surgeon: Yves Rodrigues MD;  [facility]     CV HEART BIOPSY N/A 04/10/2019    Procedure: CV HEART BIOPSY;  Surgeon: Isiah Mcallister MD;  [facility]     CV HEART BIOPSY N/A 04/24/2019    Procedure: CV HEART BIOPSY;  Surgeon: Isiah Mcallister MD;  [facility]     CV HEART BIOPSY N/A 05/08/2019    Procedure: CV HEART BIOPSY;  Surgeon: Isiah Mcallister MD;  [facility]     CV HEART BIOPSY N/A 06/04/2019    Procedure: CV HEART BIOPSY;  Surgeon: Alton Solomon MD;  [facility]     CV HEART BIOPSY N/A 05/22/2019    Procedure: CV HEART BIOPSY;  Surgeon: Yves Rodrigues MD;  [facility]     CV HEART BIOPSY N/A 07/17/2019    Procedure: CV HEART BIOPSY;  Surgeon: Isiah Mcallister MD;  [facility]     CV HEART BIOPSY N/A 08/13/2019    Procedure: CV HEART BIOPSY;  Surgeon: Jackson Patten MD;  [facility]     CV HEART BIOPSY N/A 10/15/2019    Procedure: CV HEART BIOPSY;  Surgeon: Santino Mckeon MD;  [facility]     CV HEART BIOPSY N/A 02/12/2020    Procedure: CV HEART BIOPSY;  Surgeon: Isiah Mcallister MD;  [facility]     CV HEART BIOPSY N/A 05/05/2020    Procedure: CV HEART BIOPSY;  Surgeon: Yves Rodrigues MD;  [facility]     CV HEART BIOPSY N/A 03/17/2021    Procedure: CV HEART BIOPSY;  Surgeon: Santino Mckeon MD;  [facility]     CV HEART BIOPSY N/A 5/10/2022    Procedure: Heart Biopsy;  Surgeon: Yves Rodrigues,  MD;  Location:  HEART CARDIAC CATH LAB     CV INTRA AORTIC BALLOON N/A 02/18/2019    Procedure: Intra-Aortic Balloon;  Surgeon: Alton Solomon MD;  Location:  HEART CARDIAC CATH LAB     CV INTRA AORTIC BALLOON N/A 02/20/2019    Procedure: Replace subclavian IABP;  Surgeon: Yves Rodrigues MD;  Location:  HEART CARDIAC CATH LAB     CV INTRAVASULAR ULTRASOUND N/A 02/12/2020    Procedure: CV INTRAVASCULAR ULTRASOUND;  Surgeon: Isiah Mcallister MD;  Location:  HEART CARDIAC CATH LAB     CV LEFT HEART CATH N/A 03/19/2019    Procedure: Left Heart Cath;  Surgeon: Yves Rodrigues MD;  Location:  HEART CARDIAC CATH LAB     CV LEFT HEART CATH N/A 02/12/2020    Procedure: Left Heart Cath;  Surgeon: Isiah Mcallister MD;  Location:  HEART CARDIAC CATH LAB     CV MYOCARDIAL BIOPSY N/A 03/19/2019    Procedure: RHC/HBx - Femoral access for 3/19 per Nimisha GONZALEZ;  Surgeon: Yves Rodrigues MD;  Location:  HEART CARDIAC CATH LAB     CV MYOCARDIAL BIOPSY N/A 03/11/2019    Procedure: ADD ON RHC/HBX;  Surgeon: Alton Solomon MD;  Location:  HEART CARDIAC CATH LAB     CV RIGHT HEART CATH MEASUREMENTS RECORDED N/A 03/25/2019    Procedure: Right Heart Cath;  Surgeon: Yves Rodrigues MD;  Location:  HEART CARDIAC CATH LAB     CV RIGHT HEART CATH MEASUREMENTS RECORDED N/A 03/28/2019    Procedure: Heart Cath Right Heart Cath;  Surgeon: Yves Rodrigues MD;  Location:  HEART CARDIAC CATH LAB     CV RIGHT HEART CATH MEASUREMENTS RECORDED N/A 04/24/2019    Procedure: CV RIGHT HEART CATH;  Surgeon: Isiah Mcallister MD;  Location:  HEART CARDIAC CATH LAB     CV RIGHT HEART CATH MEASUREMENTS RECORDED N/A 06/04/2019    Procedure: CV RIGHT HEART CATH;  Surgeon: Alton Solomon MD;  Location:  HEART CARDIAC CATH LAB     CV RIGHT HEART CATH MEASUREMENTS RECORDED N/A 05/22/2019    Procedure: CV RIGHT HEART CATH;  Surgeon: Yves Rodrigues MD;  Location:  HEART CARDIAC CATH LAB      CV RIGHT HEART CATH MEASUREMENTS RECORDED N/A 08/13/2019    Procedure: CV RIGHT HEART CATH;  Surgeon: Jackson Patten MD;  Location:  HEART CARDIAC CATH LAB     CV RIGHT HEART CATH MEASUREMENTS RECORDED N/A 10/15/2019    Procedure: CV RIGHT HEART CATH;  Surgeon: Santino Mckeon MD;  Location:  HEART CARDIAC CATH LAB     CV RIGHT HEART CATH MEASUREMENTS RECORDED N/A 02/12/2020    Procedure: CV RIGHT HEART CATH;  Surgeon: Isiah Mcallister MD;  Location:  HEART CARDIAC CATH LAB     CV RIGHT HEART CATH MEASUREMENTS RECORDED N/A 03/17/2021    Procedure: CV RIGHT HEART CATH;  Surgeon: Santino Mckeon MD;  Location:  HEART CARDIAC CATH LAB     CV RIGHT HEART CATH MEASUREMENTS RECORDED N/A 2/23/2022    Procedure: CV RIGHT HEART CATH;  Surgeon: Yves Rodrigues MD;  Location:  HEART CARDIAC CATH LAB     CV RIGHT HEART CATH MEASUREMENTS RECORDED N/A 5/10/2022    Procedure: Right Heart Catheterization;  Surgeon: Yves Rodrigues MD;  Location:  HEART CARDIAC CATH LAB     CV RIGHT HEART CATH MEASUREMENTS RECORDED N/A 2/23/2023    Procedure: Right Heart Catheterization;  Surgeon: Santino Mckeon MD;  Location: [facility] CARDIAC CATH LAB     ESOPHAGOSCOPY, GASTROSCOPY, DUODENOSCOPY (EGD), COMBINED N/A 12/19/2017    Procedure: ESOPHAGOGASTRODUODENOSCOPY (EGD) with biopsies;  Surgeon: Gael Romero MD;  Location: [facility];  Service:      ESOPHAGOSCOPY, GASTROSCOPY, DUODENOSCOPY (EGD), COMBINED N/A 3/8/2022    Procedure: ESOPHAGOGASTRODUODENOSCOPY, WITH BIOPSY;  Surgeon: Paddy Saldivar DO;  Location: [facility]     H STATISTIC PICC LINE INSERTION >5YR, FAILED Bilateral 10/28/2021    L sided SVC     HAND SURGERY Left      HC REVISE MEDIAN N/CARPAL TUNNEL SURG  09/21/2016    Procedure: OPEN RIGHT CARPAL TUNNEL RELEASE CORTISONE INJECTION RIGHT THUMB;  Surgeon: Duong Santoyo MD;  Location: [facility] OR;  Service: Orthopedics     INCISION AND DRAINAGE CHEST WASHOUT, COMBINED N/A  "03/21/2019    Procedure: Incision And Drainage; Evacuation Right Chest Wall Hematoma;  Surgeon: Dewayne House MD;  [facility]     INSERT INTRAAORTIC BALLOON PUMP Left 02/19/2019    Procedure: Insert left  Subclavian Balloon Pump,  Removal Right femoral arterial balloom pump sheath;  Surgeon: Dewayne House MD;  [facility]     INSERT INTRAAORTIC BALLOON PUMP Left 02/21/2019    Procedure: SUBCLAVIAN BALLOON PUMP PLACEMENT;  Surgeon: Ben White MD;  [facility]     INSERT INTRACORONARY STENT      x2     IR PICC PLACEMENT > 5 YRS OF AGE  05/08/2019     TRANSPLANT HEART RECIPIENT N/A 02/24/2019    Procedure: TRANSPLANT HEART RECIPIENT;  Surgeon: Dewayne House MD;  [facility]     Immunizations:   Immunization History   Administered Date(s) Administered     COVID-19 Bivalent 12+ (Pfizer) 11/05/2022, 05/07/2023     COVID-19 MONOVALENT 12+ (Pfizer) 03/19/2021, 04/09/2021, 08/21/2021     COVID-19 Monovalent 12+ (Pfizer 2022) 04/02/2022     Influenza (IIV3) PF 10/22/2008, 09/25/2009, 11/07/2011, 11/30/2012, 10/27/2013, 10/02/2014     Influenza Vaccine >6 months (Alfuria,Fluzone) 10/02/2015, 09/20/2019, 09/16/2020, 09/28/2021, 11/12/2022     Influenza Vaccine, 6+MO IM (QUADRIVALENT W/PRESERVATIVES) 09/12/2016, 10/16/2017, 09/20/2018, 09/20/2019     OPV, trivalent, live 10/20/1978     Pneumo Conj 13-V (2010&after) 09/26/2018     Pneumococcal 23 valent 09/28/2019     TDAP Vaccine (Boostrix) 07/20/2012     Td (Adult), Adsorbed 10/20/1978       ROS A comprehensive review of systems was performed and was otherwise negative    Medications, allergies, and problem list were reviewed and updated    Exam  /78   Pulse 107   Temp 98.4  F (36.9  C)   Resp 18   Ht 1.708 m (5' 7.25\")   Wt 83.9 kg (185 lb)   SpO2 98%   BMI 28.76 kg/m    He does have some moderate diffuse erythema of the throat but no exudate.  No cervical adenopathy.  His lungs are clear to auscultation without " wheezing or crackles.  Good respiratory excursion.  Heart is regular, tachycardic which is baseline for his transplanted heart.  Abdomen is nontender and no edema.    Assessment/Plan  1. Sore throat  Strep throat test was negative.  Symptoms and exam consistent with a viral URI.  - Streptococcus A Rapid Screen w/Reflex to PCR - Clinic Collect  - Group A Streptococcus PCR Throat Swab    2. Acute cough  I did review the chest x-ray images personally and reviewed the results with the patient.  Lung fields were clear on chest x-ray.  No evidence of pneumonia.  No heart failure.    Cough consistent with viral URI.  Treat symptomatically with cough drops and gargling and rest.  He was given a work excuse, should be better by next Tuesday and can return to work at that time.    He was given amoxicillin clavulanate antibiotic for possible sinusitis exacerbation this holiday weekend in case he gets sinusitis or bronchitis symptoms.      - XR Chest 2 Views; Future  - amoxicillin-clavulanate (AUGMENTIN) 875-125 MG tablet; Take 1 tablet by mouth 2 times daily for 7 days If sinusitis or bronchitis symptoms develop  Dispense: 14 tablet; Refill: 0    3. Transplanted heart (H)  Stable, immunosuppression managed by transplant clinic    4. Chronic renal insufficiency, stage 3 (moderate) (H)  Stable    5. Mild intermittent asthma, unspecified whether complicated  No flare at this time.  Continue Flovent.  Albuterol as needed.      Return in 5 months (on 10/26/2023) for Physical exam.   Patient Instructions   Your symptoms are consistent with a viral cold.  You will likely cough for 1-2 more weeks.    If you develop a fever above 101.0 or increasing nasal congestion symptoms or sinusitis symptoms, you can proceed with Augmentin antibiotic treatment for sinusitis or bronchitis.    If you have significant increasing shortness of breath, or continued fevers or worsening symptoms, get reevaluated next week.    I would anticipate you will  be better by Tuesday, May 30 and will be able to return to work without restrictions at that time.    Fredrick Wolff MD, MD        Current Outpatient Medications   Medication Sig Dispense Refill     acetaminophen (TYLENOL) 325 MG tablet Take 2 tablets (650 mg) by mouth every 4 hours as needed for mild pain       albuterol (PROAIR HFA/PROVENTIL HFA/VENTOLIN HFA) 108 (90 Base) MCG/ACT inhaler Inhale 2 puffs into the lungs every 6 hours as needed for shortness of breath or wheezing 18 g 5     amLODIPine (NORVASC) 5 MG tablet TAKE 2 TABLETS BY MOUTH EVERY DAY 60 tablet 11     amoxicillin-clavulanate (AUGMENTIN) 875-125 MG tablet Take 1 tablet by mouth 2 times daily for 7 days If sinusitis or bronchitis symptoms develop 14 tablet 0     aspirin (ASA) 81 MG chewable tablet Take 1 tablet (81 mg) by mouth daily       azaTHIOprine 100 MG TABS Take 100 mg by mouth daily 90 tablet 1     calcium carbonate 600 mg-vitamin D 400 units (CALTRATE) 600-400 MG-UNIT per tablet Take 1 tablet by mouth 2 times daily (with meals)       cetirizine (ZYRTEC) 10 MG tablet Take 10 mg by mouth daily       clindamycin (CLINDAMAX) 1 % external gel Apply twice daily as needed for acne on the spots 60 g 11     DULoxetine (CYMBALTA) 20 MG capsule Take 1 capsule (20 mg) by mouth daily 90 capsule 3     fluticasone (FLOVENT HFA) 220 MCG/ACT inhaler Inhale 2 puffs into the lungs 2 times daily 12 g 3     levothyroxine (SYNTHROID/LEVOTHROID) 75 MCG tablet Take 1 tablet (75 mcg) by mouth daily 90 tablet 3     losartan (COZAAR) 25 MG tablet Take 3 tablets (75 mg) by mouth daily 270 tablet 3     melatonin 3 MG tablet Take 2 tablets (6 mg) by mouth At Bedtime (Patient taking differently: Take 3 mg by mouth nightly as needed)       multivitamin w/minerals (THERA-VIT-M) tablet Take 1 tablet by mouth daily       omeprazole (PRILOSEC) 40 MG DR capsule Take 1 capsule (40 mg) by mouth daily 90 capsule 3     RESTASIS 0.05 % ophthalmic emulsion Place 1 drop into both  eyes 2 times daily       rosuvastatin (CRESTOR) 40 MG tablet Take 1 tablet (40 mg) by mouth daily 90 tablet 3     sildenafil (VIAGRA) 50 MG tablet Take 1 tablet (50 mg) by mouth daily as needed (Erectile dysfunction) 10 tablet 11     sirolimus (GENERIC EQUIVALENT) 1 MG tablet TAKE 4 TABLETS BY MOUTH  DAILY 120 tablet 11     tadalafil (CIALIS) 10 MG tablet Take 1 tablet (10 mg) by mouth daily as needed (Erectile dysfunction) 10 tablet 3     Allergies   Allergen Reactions     Hydromorphone Other (See Comments)     Significant Delirium     Adhesive Tape Blisters     Tegaderm causes bruises, blisters and extreme irritation.     Lisinopril Other (See Comments)     hypotension     Quinolones Other (See Comments)     Would not rx quinolones until QTc interval improved.      Tobramycin Other (See Comments)     Would not use aminoglycosides as his kidney function is very borderline     Codeine Nausea     Social History     Tobacco Use     Smoking status: Former     Packs/day: 1.00     Years: 25.00     Pack years: 25.00     Types: Cigarettes     Start date: 1980     Quit date: 2008     Years since quittin.5     Smokeless tobacco: Never   Substance Use Topics     Alcohol use: Yes     Alcohol/week: 1.0 - 2.0 standard drink of alcohol     Types: 1 - 2 Cans of beer per week     Drug use: No             Subjective   Everton is a 58 year old, presenting for the following health issues:  Cough (Sore throat, runny nose, fever, back pain, congestion. X 4 days)        2023    11:36 AM   Additional Questions   Roomed by Rosalind WILLSON   Accompanied by rafal         2023    11:36 AM   Patient Reported Additional Medications   Patient reports taking the following new medications no     Cough    History of Present Illness       Reason for visit:  Sore throat congestion cough 100degree fever  Symptom onset:  1-3 days ago    He eats 2-3 servings of fruits and vegetables daily.He consumes 0 sweetened beverage(s) daily.He exercises  with enough effort to increase his heart rate 20 to 29 minutes per day.  He exercises with enough effort to increase his heart rate 5 days per week.   He is taking medications regularly.    Today's PHQ-9         PHQ-9 Total Score: 3    PHQ-9 Q9 Thoughts of better off dead/self-harm past 2 weeks :   Not at all    How difficult have these problems made it for you to do your work, take care of things at home, or get along with other people: Not difficult at all               Review of Systems   Respiratory: Positive for cough.             Objective    There were no vitals taken for this visit.  There is no height or weight on file to calculate BMI.  Physical Exam

## 2023-05-24 NOTE — PATIENT INSTRUCTIONS
Your symptoms are consistent with a viral cold.  You will likely cough for 1-2 more weeks.    If you develop a fever above 101.0 or increasing nasal congestion symptoms or sinusitis symptoms, you can proceed with Augmentin antibiotic treatment for sinusitis or bronchitis.    If you have significant increasing shortness of breath, or continued fevers or worsening symptoms, get reevaluated next week.    I would anticipate you will be better by Tuesday, May 30 and will be able to return to work without restrictions at that time.

## 2023-05-27 DIAGNOSIS — Z94.1 HEART REPLACED BY TRANSPLANT (H): ICD-10-CM

## 2023-05-30 RX ORDER — ROSUVASTATIN CALCIUM 40 MG/1
40 TABLET, COATED ORAL DAILY
Qty: 90 TABLET | Refills: 3 | Status: SHIPPED | OUTPATIENT
Start: 2023-05-30 | End: 2024-03-25

## 2023-06-12 NOTE — PATIENT INSTRUCTIONS
Get another bivalent COVID booster shot after May 2, that can be done at local pharmacy.    Consider getting a Shingrix shingles vaccine.  It is a series of 2 shots that are 2 to 6 months apart.  You can get that at local pharmacy as well or talk to your transplant team.  I did place future orders at this clinic if you wish to consider it, but check with your insurance first for coverage.    You can use Cialis as needed for erectile dysfunction.  Do not take nitroglycerin with Cialis.    Follow-up in 6 months for a general checkup appointment.    No change in medication treatment plan.       Rituxan Pregnancy And Lactation Text: This medication is Pregnancy Category C and it isn't know if it is safe during pregnancy. It is unknown if this medication is excreted in breast milk but similar antibodies are known to be excreted.

## 2023-06-26 ENCOUNTER — TELEPHONE (OUTPATIENT)
Dept: TRANSPLANT | Facility: CLINIC | Age: 58
End: 2023-06-26
Payer: COMMERCIAL

## 2023-06-26 ASSESSMENT — ENCOUNTER SYMPTOMS: NEW SYMPTOMS OF CORONARY ARTERY DISEASE: 0

## 2023-07-05 NOTE — TELEPHONE ENCOUNTER
Patient's stool studies returned positive for C. Diff. Discussed these results with the patient by phone. This is his first episode of C. Diff - plan to treat with 125mg PO vancomycin QID for 10 days. Patient also noted that he is feeling significantly worse compared to last week when he was seen in clinic. He describes worse abdominal pain, a new cough and fatigue but denies any fevers at this time. Advised patient to be evaluated in person to determine best next course given his complex medical conditions including OHT on immune suppression, UC with recent CMV viremia (started PO valcyte last week with ID), FRANKLIN and C. Diff.     Recommendations:   -- Start Vancomycin 125mg PO QID x 10 days (prescription has been sent to patient's pharmacy in [place])  -- Patient to present to [place] clinic or ED, would recommend the following: CBC w/ diff, CMP, CRP and abdominal imaging  -- Pending outcome from evaluation patient will also need colonoscopy and MRCP scheduled (timing pending clinical course)   Underweight (BMI < 19)

## 2023-07-22 DIAGNOSIS — M80.00XD AGE-RELATED OSTEOPOROSIS WITH CURRENT PATHOLOGICAL FRACTURE WITH ROUTINE HEALING, SUBSEQUENT ENCOUNTER: ICD-10-CM

## 2023-07-22 DIAGNOSIS — Z92.241 HISTORY OF CORTICOSTEROID THERAPY: ICD-10-CM

## 2023-07-22 DIAGNOSIS — R79.89 LOW TSH LEVEL: ICD-10-CM

## 2023-07-22 DIAGNOSIS — E03.2 HYPOTHYROIDISM DUE TO MEDICATION: Chronic | ICD-10-CM

## 2023-07-26 RX ORDER — LEVOTHYROXINE SODIUM 75 UG/1
TABLET ORAL
Qty: 90 TABLET | Refills: 3 | OUTPATIENT
Start: 2023-07-26

## 2023-09-11 ENCOUNTER — TELEPHONE (OUTPATIENT)
Dept: ONCOLOGY | Facility: CLINIC | Age: 58
End: 2023-09-11
Payer: COMMERCIAL

## 2023-09-11 DIAGNOSIS — Z94.1 TRANSPLANTED HEART (H): ICD-10-CM

## 2023-09-11 RX ORDER — SIROLIMUS 1 MG/1
3 TABLET, FILM COATED ORAL DAILY
Qty: 270 TABLET | Refills: 11 | Status: SHIPPED | OUTPATIENT
Start: 2023-09-11 | End: 2024-03-06

## 2023-09-11 NOTE — TELEPHONE ENCOUNTER
Spoke with patient he would like to continue [place] [id number] [facility] [employer] - [facility], [address]

## 2023-09-11 NOTE — TELEPHONE ENCOUNTER
Patient is eligible to fill with [facility].  Patient currently filling with   [facility] [id number] [place] [address]    pharmacy, left message for patient to offer [facility].

## 2023-09-22 NOTE — TELEPHONE ENCOUNTER
DIAGNOSIS: B\L shoulder pain\ Self\ [employer]\ ortho con    APPOINTMENT DATE: 10/04/23   NOTES STATUS DETAILS   OFFICE NOTE from referring provider N/A Self   OFFICE NOTE from other specialist Internal 02/24/23 - Slime Lindsay MD     06/22/21 - Fredrick Wolff MD    MEDICATION LIST Internal

## 2023-09-28 DIAGNOSIS — M25.519 PAIN IN JOINT, SHOULDER REGION: Primary | ICD-10-CM

## 2023-09-29 ENCOUNTER — LAB (OUTPATIENT)
Dept: LAB | Facility: CLINIC | Age: 58
End: 2023-09-29
Payer: COMMERCIAL

## 2023-09-29 DIAGNOSIS — Z94.1 TRANSPLANTED HEART (H): ICD-10-CM

## 2023-09-29 LAB
ALBUMIN SERPL BCG-MCNC: 4.2 G/DL (ref 3.5–5.2)
ALP SERPL-CCNC: 101 U/L (ref 40–129)
ALT SERPL W P-5'-P-CCNC: 22 U/L (ref 0–70)
ANION GAP SERPL CALCULATED.3IONS-SCNC: 12 MMOL/L (ref 7–15)
AST SERPL W P-5'-P-CCNC: 25 U/L (ref 0–45)
BASOPHILS # BLD AUTO: 0 10E3/UL (ref 0–0.2)
BASOPHILS NFR BLD AUTO: 1 %
BILIRUB SERPL-MCNC: 0.3 MG/DL
BUN SERPL-MCNC: 37.3 MG/DL (ref 8–23)
CALCIUM SERPL-MCNC: 9.1 MG/DL (ref 8.8–10.2)
CHLORIDE SERPL-SCNC: 105 MMOL/L (ref 98–107)
CREAT SERPL-MCNC: 2.24 MG/DL (ref 0.67–1.17)
DEPRECATED HCO3 PLAS-SCNC: 21 MMOL/L (ref 22–29)
EGFRCR SERPLBLD CKD-EPI 2021: 33 ML/MIN/1.73M2
EOSINOPHIL # BLD AUTO: 0.2 10E3/UL (ref 0–0.7)
EOSINOPHIL NFR BLD AUTO: 4 %
ERYTHROCYTE [DISTWIDTH] IN BLOOD BY AUTOMATED COUNT: 14.9 % (ref 10–15)
GLUCOSE SERPL-MCNC: 82 MG/DL (ref 70–99)
HCT VFR BLD AUTO: 32.1 % (ref 40–53)
HGB BLD-MCNC: 10.5 G/DL (ref 13.3–17.7)
IMM GRANULOCYTES # BLD: 0 10E3/UL
IMM GRANULOCYTES NFR BLD: 0 %
LYMPHOCYTES # BLD AUTO: 1.1 10E3/UL (ref 0.8–5.3)
LYMPHOCYTES NFR BLD AUTO: 26 %
MAGNESIUM SERPL-MCNC: 2 MG/DL (ref 1.7–2.3)
MCH RBC QN AUTO: 27.3 PG (ref 26.5–33)
MCHC RBC AUTO-ENTMCNC: 32.7 G/DL (ref 31.5–36.5)
MCV RBC AUTO: 84 FL (ref 78–100)
MONOCYTES # BLD AUTO: 0.6 10E3/UL (ref 0–1.3)
MONOCYTES NFR BLD AUTO: 14 %
NEUTROPHILS # BLD AUTO: 2.2 10E3/UL (ref 1.6–8.3)
NEUTROPHILS NFR BLD AUTO: 55 %
PHOSPHATE SERPL-MCNC: 3.7 MG/DL (ref 2.5–4.5)
PLATELET # BLD AUTO: 176 10E3/UL (ref 150–450)
POTASSIUM SERPL-SCNC: 4.4 MMOL/L (ref 3.4–5.3)
PROT SERPL-MCNC: 7.4 G/DL (ref 6.4–8.3)
RBC # BLD AUTO: 3.84 10E6/UL (ref 4.4–5.9)
SODIUM SERPL-SCNC: 138 MMOL/L (ref 135–145)
WBC # BLD AUTO: 4 10E3/UL (ref 4–11)

## 2023-09-29 PROCEDURE — 80195 ASSAY OF SIROLIMUS: CPT

## 2023-09-29 PROCEDURE — 84100 ASSAY OF PHOSPHORUS: CPT

## 2023-09-29 PROCEDURE — 83735 ASSAY OF MAGNESIUM: CPT

## 2023-09-29 PROCEDURE — 80053 COMPREHEN METABOLIC PANEL: CPT

## 2023-09-29 PROCEDURE — 36415 COLL VENOUS BLD VENIPUNCTURE: CPT

## 2023-09-29 PROCEDURE — 85025 COMPLETE CBC W/AUTO DIFF WBC: CPT

## 2023-09-30 LAB
SIROLIMUS BLD-MCNC: 5.5 UG/L (ref 5–15)
TME LAST DOSE: NORMAL H
TME LAST DOSE: NORMAL H

## 2023-10-04 ENCOUNTER — ANCILLARY PROCEDURE (OUTPATIENT)
Dept: GENERAL RADIOLOGY | Facility: CLINIC | Age: 58
End: 2023-10-04
Attending: FAMILY MEDICINE
Payer: COMMERCIAL

## 2023-10-04 ENCOUNTER — PRE VISIT (OUTPATIENT)
Dept: ORTHOPEDICS | Facility: CLINIC | Age: 58
End: 2023-10-04

## 2023-10-04 ENCOUNTER — OFFICE VISIT (OUTPATIENT)
Dept: ORTHOPEDICS | Facility: CLINIC | Age: 58
End: 2023-10-04
Payer: COMMERCIAL

## 2023-10-04 DIAGNOSIS — M75.42 IMPINGEMENT SYNDROME OF LEFT SHOULDER: Primary | ICD-10-CM

## 2023-10-04 DIAGNOSIS — M25.519 PAIN IN JOINT, SHOULDER REGION: ICD-10-CM

## 2023-10-04 DIAGNOSIS — M75.101 ROTATOR CUFF SYNDROME OF BOTH SHOULDERS: ICD-10-CM

## 2023-10-04 DIAGNOSIS — M19.012 PRIMARY OSTEOARTHRITIS OF BOTH SHOULDERS: ICD-10-CM

## 2023-10-04 DIAGNOSIS — M75.102 ROTATOR CUFF SYNDROME OF BOTH SHOULDERS: ICD-10-CM

## 2023-10-04 DIAGNOSIS — M25.811 SHOULDER IMPINGEMENT, RIGHT: ICD-10-CM

## 2023-10-04 DIAGNOSIS — M19.011 PRIMARY OSTEOARTHRITIS OF BOTH SHOULDERS: ICD-10-CM

## 2023-10-04 PROCEDURE — 99203 OFFICE O/P NEW LOW 30 MIN: CPT | Performed by: FAMILY MEDICINE

## 2023-10-04 PROCEDURE — 73030 X-RAY EXAM OF SHOULDER: CPT | Mod: LT | Performed by: RADIOLOGY

## 2023-10-04 NOTE — LETTER
10/4/2023      RE: Everton Larios  [address] [facility] [address] [id number]     Dear Colleague,    Thank you for referring your patient, Everton Larios, to the [facility] [place]. Please see a copy of my visit note below.    Sports Medicine Clinic Visit    PCP: Fredrick Wolff    Everton Larios is a 58 year old male who is seen  as self referral presenting with bilateral shoulder pain.  Recurrent impingement pain with overhead reaching or an outstretched arm.  No recent shoulder injury.    Injury: Pt reports no injury but about 12 years worth of recurrent right sided shoulder impingement pain and 1-2 years of left sided shoulder impingement pain, worse with overhead reaching and outstretched arm.    Pt consulted with [facility] in past regarding bilateral shoulder pain.  Was referred to physical therapy at that time, which was many years ago.  He has not had an MRI of the shoulder in the past.    Patient is still working as a .    Location of Pain: bilateral shoulders  Duration of Pain: 12 year(s)  Rating of Pain: 4/10  Pain is better with: Previous PT  Pain is worse with: General movement of the shoulder  Additional Features: Weakness, grinding  Treatment so far consists of: Previous PT  Prior History of related problems: None     There were no vitals taken for this visit.    Patient has been a .  History of heart transplant.  ICD device has since been removed.  Pt gets routine cardiac MRIs.    Medicines include Cymbalta, Synthroid, Cozaar, azathioprine, Norvasc, Crestor, sirolimus, aspirin        Imaging studies below reviewed by me:  [facility]   CERVICAL SPINE CT WITHOUT IV CONTRAST   4/21/2019 4:02 PM     INDICATION: Traumatic neck injury. Neck and back pain after fall.   TECHNIQUE:   HEAD CT: Without IV contrast. Dose reduction techniques were used.    CERVICAL SPINE CT: Axial images were obtained through the cervical spine and were  evaluated in the axial plane with sagittal and coronal reformations. Dose reduction techniques were used.   SEDATION: None.   COMPARISON: None.     FINDINGS:     CERVICAL SPINE CT: No fracture.  No posttraumatic subluxation. No high grade spinal canal or neural foraminal stenosis.     Grossly normal paraspinal soft tissues. Normal lung apices.     IMPRESSION:   CONCLUSION:     1. No fracture or subluxation.   2.  No high grade spinal canal or neural foraminal stenosis.         XR CERVICAL SPINE 4 - 5 VWS 2015 12:32 PM INDICATION: Cervicalgia.COMPARISON: None.FINDINGS:  Normal cervical lordosis. Slight degenerative retrolisthesis at each level between C2 and C5. Mild to moderate disc space narrowing C4-5 and C5-6. Mild disc space narrowing C3-4. No appreciable facet arthropathy. No significant superimposed bony finding. No prevertebral or posterior soft tissue swelling.Right chest defibrillator leads noted. No other significant ancillary finding.    PMH:  Past Medical History:   Diagnosis Date    Alcohol abuse     Arthritis     hands, neck    ASD (atrial septal defect)     s/p repair age 3    Asthma     Atrial fibrillation (H)     Pierce's esophagus 10/4/2018    Pierce's esophagus     Cataract     CHF (congestive heart failure) (H)     Chronic rhinitis 10/4/2018    Chronic systolic heart failure (H) 10/4/2018    Clotting disorder (H)     Congenital cardiomyopathy in  (H)     Depression 10/4/2018    Depression     Diastolic dysfunction     DJD (degenerative joint disease)     neck    DJD (degenerative joint disease) - neck 10/4/2018    H/O congenital atrial septal defect (ASD) repair at age 3 10/4/2018    History of anesthesia complications     nausea    History of transfusion     Hypothyroidism     Hypothyroidism due to medication 10/4/2018    ICD (implantable cardioverter-defibrillator) in place     ICD, [employer] ; gen change 2018 10/4/2018    Intermittent asthma without complication  10/4/2018    Nonischemic cardiomyopathy (H) 10/4/2018    On amiodarone therapy 10/4/2018    Pacemaker     Paroxysmal atrial fibrillation (H) 10/4/2018    S/P ablation of atrial fibrillation 10/4/2018    Systolic heart failure (H)     Ulcerative colitis (H) 2005    Ulcerative colitis (H)     Ventricular tachycardia (H) 10/4/2018    Ventricular tachycardia (H)        Active problem list:  Patient Active Problem List   Diagnosis    H/O congenital atrial septal defect (ASD) repair at age 3    Ulcerative colitis (H)    Hypothyroidism due to medication    DJD (degenerative joint disease) - neck    Pierce's esophagus    Intermittent asthma without complication    Chronic rhinitis    Depression    Iron deficiency anemia    Heart replaced by transplant (H)    Debility    Abnormal white blood cell (WBC) count    Sepsis due to Pseudomonas aeruginosa (H)    Pulmonary nodules    Compression fracture of thoracic vertebra (H)    Central line clotted (H24)    FRANKLIN (acute kidney injury) (H24)    Transplanted heart (H)    C. difficile diarrhea    Encounter for therapeutic drug monitoring    Age-related osteoporosis with current pathological fracture with routine healing, subsequent encounter    Low TSH level    History of corticosteroid therapy    Status post coronary angiogram    Vasculopathy of cardiac allograft (H)    Chronic kidney disease, stage 3b (H)       FH:  Family History   Problem Relation Age of Onset    Skin Cancer Mother     Endometrial Cancer Mother     Osteoporosis Mother     Hypertension Father     Endometrial Cancer Maternal Grandmother     Hip fracture No family hx of     Nephrolithiasis No family hx of        SH:  Social History     Socioeconomic History    Marital status: Single     Spouse name: Not on file    Number of children: Not on file    Years of education: Not on file    Highest education level: Not on file   Occupational History    Not on file   Tobacco Use    Smoking status: Former     Packs/day: 1.00      Years: 25.00     Pack years: 25.00     Types: Cigarettes     Start date: 1980     Quit date: 2008     Years since quittin.9    Smokeless tobacco: Never   Substance and Sexual Activity    Alcohol use: Yes     Alcohol/week: 1.0 - 2.0 standard drink of alcohol     Types: 1 - 2 Cans of beer per week    Drug use: No    Sexual activity: Not on file   Other Topics Concern    Not on file   Social History Narrative    Everton is a retired . He lives by himself in a townhouse in [place]. He has no lawn care responsibilities. He will be staying with his folks during his post-hospital early transplant care time. No history of TB exposures.     He works part-time at [employer].     Social Determinants of Health     Financial Resource Strain: Not on file   Food Insecurity: Not on file   Transportation Needs: Not on file   Physical Activity: Not on file   Stress: Not on file   Social Connections: Not on file   Interpersonal Safety: Not on file   Housing Stability: Not on file       MEDS:  See EMR, reviewed  ALL:  See EMR, reviewed    REVIEW OF SYSTEMS:  CONSTITUTIONAL:NEGATIVE for fever, chills, change in weight  INTEGUMENTARY/SKIN: NEGATIVE for worrisome rashes, moles or lesions  EYES: NEGATIVE for vision changes or irritation  ENT/MOUTH: NEGATIVE for ear, mouth and throat problems  RESP:NEGATIVE for significant cough or SOB  BREAST: NEGATIVE for masses, tenderness or discharge  CV: NEGATIVE for chest pain, palpitations or peripheral edema  GI: NEGATIVE for nausea, abdominal pain, heartburn, or change in bowel habits  :NEGATIVE for frequency, dysuria, or hematuria  :NEGATIVE for frequency, dysuria, or hematuria  NEURO: NEGATIVE for weakness, dizziness or paresthesias  ENDOCRINE: NEGATIVE for temperature intolerance, skin/hair changes  HEME/ALLERGY/IMMUNE: NEGATIVE for bleeding problems  PSYCHIATRIC: NEGATIVE for changes in mood or affect      The bilateral shoulders have full passive range of  motion but overhead  impingement signs to Neer's and Burleson are mildly positive.  No crepitance noted.  Nontender at the AC joint bilaterally.  Nontender of the anterior cuff or biceps tendon bilaterally.  He has 5 out of 5 strength bilaterally at deltoid, supraspinatus, infraspinatus and subscapularis.  Skin overlying the bilateral shoulders is normal.  No palpable effusion bilaterally.  No scapular winging.  Head forward, shoulder forward posture.  Appropriate conversation and affect.    I personally viewed the patient x-rays of the bilateral shoulders that overall show good maintenance of the glenohumeral joint space with small spurs at the peripheral corners consistent with mild DJD.  No significant AC joint DJD.    Assessment: Bilateral shoulder impingement syndrome, likely with underlying rotator cuff syndrome and mild glenohumeral DJD    Plan: Patient would like to avoid an ultrasound-guided or x-ray guided intra-articular cortisone injection for the shoulder for now.  He is hoping to avoid injections.  I do not see an indication for surgery based on his current presentation.  Therefore we will avoid an MRI of the shoulder at this time.  He will start with a physical therapy protocol for the posterior shoulder and rotator cuff.  He would like to do this in [place].  Follow-up if this protocol is not helpful.  At that point either empiric ultrasound-guided glenohumeral injection could be considered or an MRI of the shoulder.      Gael Merritt MD

## 2023-10-04 NOTE — PROGRESS NOTES
Sports Medicine Clinic Visit    PCP: Fredrick Wolff Ric is a 58 year old male who is seen  as self referral presenting with bilateral shoulder pain.  Recurrent impingement pain with overhead reaching or an outstretched arm.  No recent shoulder injury.    Injury: Pt reports no injury but about 12 years worth of recurrent right sided shoulder impingement pain and 1-2 years of left sided shoulder impingement pain, worse with overhead reaching and outstretched arm.    Pt consulted with [facility] in past regarding bilateral shoulder pain.  Was referred to physical therapy at that time, which was many years ago.  He has not had an MRI of the shoulder in the past.    Patient is still working as a .    Location of Pain: bilateral shoulders  Duration of Pain: 12 year(s)  Rating of Pain: 4/10  Pain is better with: Previous PT  Pain is worse with: General movement of the shoulder  Additional Features: Weakness, grinding  Treatment so far consists of: Previous PT  Prior History of related problems: None     There were no vitals taken for this visit.    Patient has been a .  History of heart transplant.  ICD device has since been removed.  Pt gets routine cardiac MRIs.    Medicines include Cymbalta, Synthroid, Cozaar, azathioprine, Norvasc, Crestor, sirolimus, aspirin        Imaging studies below reviewed by me:  [facility]   CERVICAL SPINE CT WITHOUT IV CONTRAST   4/21/2019 4:02 PM     INDICATION: Traumatic neck injury. Neck and back pain after fall.   TECHNIQUE:   HEAD CT: Without IV contrast. Dose reduction techniques were used.    CERVICAL SPINE CT: Axial images were obtained through the cervical spine and were evaluated in the axial plane with sagittal and coronal reformations. Dose reduction techniques were used.   SEDATION: None.   COMPARISON: None.     FINDINGS:     CERVICAL SPINE CT: No fracture.  No posttraumatic subluxation. No high grade spinal canal or neural  foraminal stenosis.     Grossly normal paraspinal soft tissues. Normal lung apices.     IMPRESSION:   CONCLUSION:     1. No fracture or subluxation.   2.  No high grade spinal canal or neural foraminal stenosis.         XR CERVICAL SPINE 4 - 5 VWS 2015 12:32 PM INDICATION: Cervicalgia.COMPARISON: None.FINDINGS:  Normal cervical lordosis. Slight degenerative retrolisthesis at each level between C2 and C5. Mild to moderate disc space narrowing C4-5 and C5-6. Mild disc space narrowing C3-4. No appreciable facet arthropathy. No significant superimposed bony finding. No prevertebral or posterior soft tissue swelling.Right chest defibrillator leads noted. No other significant ancillary finding.    PMH:  Past Medical History:   Diagnosis Date    Alcohol abuse     Arthritis     hands, neck    ASD (atrial septal defect)     s/p repair age 3    Asthma     Atrial fibrillation (H)     Pierce's esophagus 10/4/2018    Pierce's esophagus     Cataract     CHF (congestive heart failure) (H)     Chronic rhinitis 10/4/2018    Chronic systolic heart failure (H) 10/4/2018    Clotting disorder (H)     Congenital cardiomyopathy in  (H)     Depression 10/4/2018    Depression     Diastolic dysfunction     DJD (degenerative joint disease)     neck    DJD (degenerative joint disease) - neck 10/4/2018    H/O congenital atrial septal defect (ASD) repair at age 3 10/4/2018    History of anesthesia complications     nausea    History of transfusion     Hypothyroidism     Hypothyroidism due to medication 10/4/2018    ICD (implantable cardioverter-defibrillator) in place     ICD, [employer] ; gen change 2018 10/4/2018    Intermittent asthma without complication 10/4/2018    Nonischemic cardiomyopathy (H) 10/4/2018    On amiodarone therapy 10/4/2018    Pacemaker     Paroxysmal atrial fibrillation (H) 10/4/2018    S/P ablation of atrial fibrillation 10/4/2018    Systolic heart failure (H)     Ulcerative colitis (H)      Ulcerative colitis (H)     Ventricular tachycardia (H) 10/4/2018    Ventricular tachycardia (H)        Active problem list:  Patient Active Problem List   Diagnosis    H/O congenital atrial septal defect (ASD) repair at age 3    Ulcerative colitis (H)    Hypothyroidism due to medication    DJD (degenerative joint disease) - neck    Pierce's esophagus    Intermittent asthma without complication    Chronic rhinitis    Depression    Iron deficiency anemia    Heart replaced by transplant (H)    Debility    Abnormal white blood cell (WBC) count    Sepsis due to Pseudomonas aeruginosa (H)    Pulmonary nodules    Compression fracture of thoracic vertebra (H)    Central line clotted (H24)    FRANKLIN (acute kidney injury) (H24)    Transplanted heart (H)    C. difficile diarrhea    Encounter for therapeutic drug monitoring    Age-related osteoporosis with current pathological fracture with routine healing, subsequent encounter    Low TSH level    History of corticosteroid therapy    Status post coronary angiogram    Vasculopathy of cardiac allograft (H)    Chronic kidney disease, stage 3b (H)       FH:  Family History   Problem Relation Age of Onset    Skin Cancer Mother     Endometrial Cancer Mother     Osteoporosis Mother     Hypertension Father     Endometrial Cancer Maternal Grandmother     Hip fracture No family hx of     Nephrolithiasis No family hx of        SH:  Social History     Socioeconomic History    Marital status: Single     Spouse name: Not on file    Number of children: Not on file    Years of education: Not on file    Highest education level: Not on file   Occupational History    Not on file   Tobacco Use    Smoking status: Former     Packs/day: 1.00     Years: 25.00     Pack years: 25.00     Types: Cigarettes     Start date: 1980     Quit date: 2008     Years since quittin.9    Smokeless tobacco: Never   Substance and Sexual Activity    Alcohol use: Yes     Alcohol/week: 1.0 - 2.0 standard drink  of alcohol     Types: 1 - 2 Cans of beer per week    Drug use: No    Sexual activity: Not on file   Other Topics Concern    Not on file   Social History Narrative    Everton is a retired . He lives by himself in a townhouse in [place]. He has no lawn care responsibilities. He will be staying with his folks during his post-hospital early transplant care time. No history of TB exposures.     He works part-time at [employer].     Social Determinants of Health     Financial Resource Strain: Not on file   Food Insecurity: Not on file   Transportation Needs: Not on file   Physical Activity: Not on file   Stress: Not on file   Social Connections: Not on file   Interpersonal Safety: Not on file   Housing Stability: Not on file       MEDS:  See EMR, reviewed  ALL:  See EMR, reviewed    REVIEW OF SYSTEMS:  CONSTITUTIONAL:NEGATIVE for fever, chills, change in weight  INTEGUMENTARY/SKIN: NEGATIVE for worrisome rashes, moles or lesions  EYES: NEGATIVE for vision changes or irritation  ENT/MOUTH: NEGATIVE for ear, mouth and throat problems  RESP:NEGATIVE for significant cough or SOB  BREAST: NEGATIVE for masses, tenderness or discharge  CV: NEGATIVE for chest pain, palpitations or peripheral edema  GI: NEGATIVE for nausea, abdominal pain, heartburn, or change in bowel habits  :NEGATIVE for frequency, dysuria, or hematuria  :NEGATIVE for frequency, dysuria, or hematuria  NEURO: NEGATIVE for weakness, dizziness or paresthesias  ENDOCRINE: NEGATIVE for temperature intolerance, skin/hair changes  HEME/ALLERGY/IMMUNE: NEGATIVE for bleeding problems  PSYCHIATRIC: NEGATIVE for changes in mood or affect      The bilateral shoulders have full passive range of motion but overhead  impingement signs to Neer's and Burleson are mildly positive.  No crepitance noted.  Nontender at the AC joint bilaterally.  Nontender of the anterior cuff or biceps tendon bilaterally.  He has 5 out of 5 strength bilaterally at deltoid,  supraspinatus, infraspinatus and subscapularis.  Skin overlying the bilateral shoulders is normal.  No palpable effusion bilaterally.  No scapular winging.  Head forward, shoulder forward posture.  Appropriate conversation and affect.    I personally viewed the patient x-rays of the bilateral shoulders that overall show good maintenance of the glenohumeral joint space with small spurs at the peripheral corners consistent with mild DJD.  No significant AC joint DJD.    Assessment: Bilateral shoulder impingement syndrome, likely with underlying rotator cuff syndrome and mild glenohumeral DJD    Plan: Patient would like to avoid an ultrasound-guided or x-ray guided intra-articular cortisone injection for the shoulder for now.  He is hoping to avoid injections.  I do not see an indication for surgery based on his current presentation.  Therefore we will avoid an MRI of the shoulder at this time.  He will start with a physical therapy protocol for the posterior shoulder and rotator cuff.  He would like to do this in [place].  Follow-up if this protocol is not helpful.  At that point either empiric ultrasound-guided glenohumeral injection could be considered or an MRI of the shoulder.

## 2023-10-16 ENCOUNTER — TELEPHONE (OUTPATIENT)
Dept: TRANSPLANT | Facility: CLINIC | Age: 58
End: 2023-10-16
Payer: COMMERCIAL

## 2023-10-16 DIAGNOSIS — I10 BENIGN ESSENTIAL HYPERTENSION: ICD-10-CM

## 2023-10-16 RX ORDER — AMLODIPINE BESYLATE 5 MG/1
10 TABLET ORAL DAILY
Qty: 180 TABLET | Refills: 11 | Status: SHIPPED | OUTPATIENT
Start: 2023-10-16

## 2023-10-18 ENCOUNTER — THERAPY VISIT (OUTPATIENT)
Dept: PHYSICAL THERAPY | Facility: REHABILITATION | Age: 58
End: 2023-10-18
Payer: COMMERCIAL

## 2023-10-18 DIAGNOSIS — M19.011 PRIMARY OSTEOARTHRITIS OF BOTH SHOULDERS: ICD-10-CM

## 2023-10-18 DIAGNOSIS — M25.811 SHOULDER IMPINGEMENT, RIGHT: ICD-10-CM

## 2023-10-18 DIAGNOSIS — M75.102 ROTATOR CUFF SYNDROME OF BOTH SHOULDERS: ICD-10-CM

## 2023-10-18 DIAGNOSIS — M75.101 ROTATOR CUFF SYNDROME OF BOTH SHOULDERS: ICD-10-CM

## 2023-10-18 DIAGNOSIS — M75.42 IMPINGEMENT SYNDROME OF LEFT SHOULDER: ICD-10-CM

## 2023-10-18 DIAGNOSIS — M19.012 PRIMARY OSTEOARTHRITIS OF BOTH SHOULDERS: ICD-10-CM

## 2023-10-18 PROCEDURE — 97110 THERAPEUTIC EXERCISES: CPT | Mod: GP

## 2023-10-18 PROCEDURE — 97161 PT EVAL LOW COMPLEX 20 MIN: CPT | Mod: GP

## 2023-10-18 NOTE — PROGRESS NOTES
PHYSICAL THERAPY EVALUATION  Type of Visit: Evaluation    See electronic medical record for Abuse and Falls Screening details.    Subjective Pt reports has bilateral shoulder pain, but mainly has right shoulder pain. Pt reports that his left shoulder is fine. Pt reports that he has had shoulder pain for about 12 years. Pt reports in severe pain when driving manual transmission. Pt reports he is right handed. Pt reports pain when sleeping on right side. Pt reports pain is always there. 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 Pt reports no incident or injury. Pt reports he works as a . Pt reports pain more in back of shoulder, but can go into whole shoulder. Pt reports sometimes has neck pain. Pt reports no lightheadedness or dizziness. Pt reports no numbness or tingling.      Presenting condition or subjective complaint: right shoulder pain  Date of onset: 10/04/23    Relevant medical history: Asthma; Kidney disease; Thyroid problems   Dates & types of surgery: 2/24/2019 heart transplant, 1976 left hand, 1968 atrial septal repair    Prior diagnostic imaging/testing results: CT scan; X-ray     Prior therapy history for the same diagnosis, illness or injury: Yes [place]    Prior Level of Function  Transfers: Independent  Ambulation: Independent    Living Environment  Social support: Alone   Type of home: [facility]; 1 level   Stairs to enter the home: No       Ramp: No   Stairs inside the home: No       Help at home: None  Equipment owned:       Employment: Yes   Hobbies/Interests:      Patient goals for therapy: drive a manual transmission    Pain assessment: Pain present     Objective   SHOULDER EVALUATION  PAIN: Pain Level at Rest: 1/10  Pain Level with Use: 7/10  Pain Location: shoulder and left and right shoulder, mainly in back of right shoulder  Pain Quality: Aching  Pain Frequency: intermittent or daily  Pain is Worst: worse when working  Pain is  Exacerbated By: laying on left side, using a mouse, driving manual transmission  Pain is Relieved By: cold, heat, rest, and move out of painful positions  Pain Progression: Unchanged  ROM:   (Degrees) Left AROM Left PROM Right AROM  Right PROM   Shoulder Flexion 150 , can feel pain WFL   Shoulder Extension WFL WFL WFL WFL   Shoulder Abduction 145  WFL   Shoulder Adduction WFL WFL WFL WFL   Shoulder Internal Rotation T10 WFL T12 WFL   Shoulder External Rotation 70 WFL 55, painful WFL   Shoulder Horizontal Abduction WFL WFL WFL WFL   Shoulder Horizontal Adduction WFL WFL WFL WFL   Shoulder Flexion ER WFL WFL WFL WFL   Shoulder Flexion IR WFL WFL WFL WFL   Elbow Extension WFL WFL WFL WFL   Elbow Flexion WFL WFL WFL WFL     STRENGTH: WFL  FLEXIBILITY: WFL  SPECIAL TESTS:    Left Right   Impingement     Neer's Negative  Positive   Hawkin's-Pete Negative  Positive        Empty Can Positive Positive   Full Can Negative Positive     PALPATION:  Tenderness upon palpation of R shoulder deltoid and external rotator musculature  CERVICAL SCREEN: WFL, right and left side bending limited due to right sided pain, right rotation pain    Assessment & Plan   CLINICAL IMPRESSIONS  Medical Diagnosis: Impingement syndrome of left shoulder; shoulder impingement, right; rotator cuff syndrome of both shoulders; primary osteoarthritis of both shoulders    Treatment Diagnosis: Bilateral shoulder pain   Impression/Assessment: Patient is a 58 year old male with bilateral shoulder pain R>L complaints.  The following significant findings have been identified: Pain, Decreased ROM/flexibility, Decreased strength, Impaired muscle performance, and Decreased activity tolerance. These impairments interfere with their ability to perform self care tasks, work tasks, recreational activities, household chores, and driving  as compared to previous level of function.     Clinical Decision Making (Complexity):  Clinical Presentation:  Stable/Uncomplicated  Clinical Presentation Rationale: based on medical and personal factors listed in PT evaluation  Clinical Decision Making (Complexity): Low complexity    PLAN OF CARE  Treatment Interventions:  Modalities: Cryotherapy, E-stim, Hot Pack  Interventions: Gait Training, Manual Therapy, Neuromuscular Re-education, Therapeutic Activity, Therapeutic Exercise, Self-Care/Home Management    Long Term Goals     PT Goal 1  Goal Identifier: HEP  Goal Description: Pt will be independent with HEP to improve mobility and decrease pain.  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  Target Date: 01/09/24  PT Goal 3  Goal Identifier: Sleeping  Goal Description: Pt will report ability to sleep on right side with less than 2/10 pain to improve functional mobility and increase tolerance completing ADL's, recreational activities and work related tasks.  Target Date: 01/09/24      Frequency of Treatment: 1 time a week  Duration of Treatment: 1 time a week or every other week for a minimum of 12 weeks, minimum of 6 sessions    Recommended Referrals to Other Professionals:  none  Education Assessment:   Learner/Method: Patient;Demonstration;Pictures/Video    Risks and benefits of evaluation/treatment have been explained.   Patient/Family/caregiver agrees with Plan of Care.     Evaluation Time:     PT Eval, Low Complexity Minutes (55629): 20   Present: Not applicable     Signing Clinician: Monique Rudd, PT

## 2023-10-21 DIAGNOSIS — M80.00XD AGE-RELATED OSTEOPOROSIS WITH CURRENT PATHOLOGICAL FRACTURE WITH ROUTINE HEALING, SUBSEQUENT ENCOUNTER: ICD-10-CM

## 2023-10-21 DIAGNOSIS — R79.89 LOW TSH LEVEL: ICD-10-CM

## 2023-10-21 DIAGNOSIS — E03.2 HYPOTHYROIDISM DUE TO MEDICATION: Chronic | ICD-10-CM

## 2023-10-21 DIAGNOSIS — Z92.241 HISTORY OF CORTICOSTEROID THERAPY: ICD-10-CM

## 2023-10-23 RX ORDER — LEVOTHYROXINE SODIUM 75 UG/1
75 TABLET ORAL DAILY
Qty: 90 TABLET | Refills: 3 | OUTPATIENT
Start: 2023-10-23

## 2023-11-08 ENCOUNTER — THERAPY VISIT (OUTPATIENT)
Dept: PHYSICAL THERAPY | Facility: REHABILITATION | Age: 58
End: 2023-11-08
Payer: COMMERCIAL

## 2023-11-08 ENCOUNTER — OFFICE VISIT (OUTPATIENT)
Dept: INTERNAL MEDICINE | Facility: CLINIC | Age: 58
End: 2023-11-08
Payer: COMMERCIAL

## 2023-11-08 VITALS
RESPIRATION RATE: 18 BRPM | DIASTOLIC BLOOD PRESSURE: 82 MMHG | HEART RATE: 104 BPM | OXYGEN SATURATION: 98 % | SYSTOLIC BLOOD PRESSURE: 130 MMHG | WEIGHT: 192 LBS | BODY MASS INDEX: 30.13 KG/M2 | TEMPERATURE: 98.3 F | HEIGHT: 67 IN

## 2023-11-08 DIAGNOSIS — M75.102 ROTATOR CUFF SYNDROME OF BOTH SHOULDERS: ICD-10-CM

## 2023-11-08 DIAGNOSIS — R05.2 SUBACUTE COUGH: ICD-10-CM

## 2023-11-08 DIAGNOSIS — N18.32 CHRONIC KIDNEY DISEASE, STAGE 3B (H): ICD-10-CM

## 2023-11-08 DIAGNOSIS — M19.011 PRIMARY OSTEOARTHRITIS OF BOTH SHOULDERS: ICD-10-CM

## 2023-11-08 DIAGNOSIS — M25.811 SHOULDER IMPINGEMENT, RIGHT: ICD-10-CM

## 2023-11-08 DIAGNOSIS — M75.101 ROTATOR CUFF SYNDROME OF BOTH SHOULDERS: ICD-10-CM

## 2023-11-08 DIAGNOSIS — J45.20 INTERMITTENT ASTHMA WITHOUT COMPLICATION, UNSPECIFIED ASTHMA SEVERITY: Primary | ICD-10-CM

## 2023-11-08 DIAGNOSIS — E03.2 HYPOTHYROIDISM DUE TO MEDICATION: Chronic | ICD-10-CM

## 2023-11-08 DIAGNOSIS — Z29.11 NEED FOR PROPHYLACTIC VACCINATION AND INOCULATION AGAINST RESPIRATORY SYNCYTIAL VIRUS (RSV): ICD-10-CM

## 2023-11-08 DIAGNOSIS — M75.42 IMPINGEMENT SYNDROME OF LEFT SHOULDER: Primary | ICD-10-CM

## 2023-11-08 DIAGNOSIS — M19.012 PRIMARY OSTEOARTHRITIS OF BOTH SHOULDERS: ICD-10-CM

## 2023-11-08 DIAGNOSIS — Z94.1 HISTORY OF HEART TRANSPLANT (H): ICD-10-CM

## 2023-11-08 PROCEDURE — 97110 THERAPEUTIC EXERCISES: CPT | Mod: GP

## 2023-11-08 PROCEDURE — 99214 OFFICE O/P EST MOD 30 MIN: CPT | Performed by: INTERNAL MEDICINE

## 2023-11-08 RX ORDER — ALBUTEROL SULFATE 90 UG/1
2 AEROSOL, METERED RESPIRATORY (INHALATION) EVERY 6 HOURS PRN
Qty: 18 G | Refills: 5 | Status: SHIPPED | OUTPATIENT
Start: 2023-11-08 | End: 2024-06-26

## 2023-11-08 RX ORDER — LEVOTHYROXINE SODIUM 75 UG/1
75 TABLET ORAL DAILY
Qty: 90 TABLET | Refills: 3 | Status: SHIPPED | OUTPATIENT
Start: 2023-11-08

## 2023-11-08 RX ORDER — FLUTICASONE PROPIONATE 220 UG/1
2 AEROSOL, METERED RESPIRATORY (INHALATION) 2 TIMES DAILY
Qty: 12 G | Refills: 3 | Status: SHIPPED | OUTPATIENT
Start: 2023-11-08 | End: 2024-04-29

## 2023-11-08 NOTE — PROGRESS NOTES
Everton Larios   58 year old male    Date of Visit: 2023    Chief Complaint   Patient presents with    Follow Up     Refills.     Subjective  Patient is coming in for routine checkup and feels well.    Status post heart transplant almost 5 years for congenital defect.  He is doing well with that.  He developed some moderate chronic renal insufficiency with transplant, creatinine was up to 2.2 in September.  Follows up again with the transplant team in February.  Not having lower extremity edema issues.    Blood pressures been well controlled on amlodipine 10 mg a day and losartan 75 mg a day.    No flare of asthma.  Get a URI in May of this year.  He has had all his immunizations although we did talk about the RSV vaccine.    Mild coronary disease and transplant.  He continues on aspirin and rosuvastatin.  No muscle achiness or abdominal pain or GI bleeding complaints.    Hypothyroid, but did run out of his Synthroid and was alternating 100 mcg to 75 mcg recently.    No palpitations.    Heartburn controlled with Prilosec and no new swallowing difficulty.  2022 EGD was negative for dysplasia.  No flare of his ulcerative colitis.  2022 colonoscopy negative.    2023 PSA normal.    Remains active, with his new job he is walking more than 10,000 steps a day.    His spirits are good, on duloxetine.  Father had  a couple of years ago.    PMHx:    Past Medical History:   Diagnosis Date    Alcohol abuse     Arthritis     hands, neck    ASD (atrial septal defect)     s/p repair age 3    Asthma     Atrial fibrillation (H)     Pierce's esophagus 10/4/2018    Pierce's esophagus     Cataract     CHF (congestive heart failure) (H)     Chronic rhinitis 10/4/2018    Chronic systolic heart failure (H) 10/4/2018    Clotting disorder (H24)     Congenital cardiomyopathy in  (H)     Depression 10/4/2018    Depression     Diastolic dysfunction     DJD (degenerative joint disease)     neck    DJD  (degenerative joint disease) - neck 10/4/2018    H/O congenital atrial septal defect (ASD) repair at age 3 10/4/2018    History of anesthesia complications     nausea    History of transfusion     Hypothyroidism     Hypothyroidism due to medication 10/4/2018    ICD (implantable cardioverter-defibrillator) in place     ICD, [employer] 2008; gen change 2/2018 10/4/2018    Intermittent asthma without complication 10/4/2018    Nonischemic cardiomyopathy (H) 10/4/2018    On amiodarone therapy 10/4/2018    Pacemaker     Paroxysmal atrial fibrillation (H) 10/4/2018    S/P ablation of atrial fibrillation 10/4/2018    Systolic heart failure (H)     Ulcerative colitis (H) 2005    Ulcerative colitis (H)     Ventricular tachycardia (H) 10/4/2018    Ventricular tachycardia (H)      PSHx:    Past Surgical History:   Procedure Laterality Date    ABLATION OF DYSRHYTHMIC FOCUS      for A fib    AICD, DUAL CHAMBER      ASD REPAIR  01/01/1968    BRONCHOSCOPY (RIGID OR FLEXIBLE), DIAGNOSTIC N/A 04/30/2019    Procedure: BRONCHOSCOPY, WITH BAL;  Surgeon: Margot Chiang MD;  Location:  GI    CARDIAC DEFIBRILLATOR PLACEMENT      x2--last was Feb 2018    COLONOSCOPY N/A 02/20/2020    Procedure: COLONOSCOPY, WITH POLYPECTOMY AND BIOPSY;  Surgeon: Ranjeet Cooper MD;  Location: [facility]    COLONOSCOPY N/A 02/05/2015    Procedure: COLONOSCOPY with biopsy;  Surgeon: Fernie Akers MD;  Location: [facility];  Service:     COLONOSCOPY N/A 12/19/2017    Procedure: COLONOSCOPY with biopsies and polypectomies using snare;  Surgeon: Gael Romero MD;  Location: [facility];  Service:     COLONOSCOPY N/A 3/8/2022    Procedure: COLONOSCOPY, WITH POLYPECTOMY AND BIOPSY;  Surgeon: Paddy Saldivar DO;  Location:  GI    CV CORONARY ANGIOGRAM N/A 02/12/2020    Procedure: CV CORONARY ANGIOGRAM;  Surgeon: Isiah Mcallister MD;  Location:  HEART CARDIAC CATH LAB    CV CORONARY ANGIOGRAM N/A 03/17/2021    Procedure: CV  CORONARY ANGIOGRAM;  Surgeon: Santino Mckeon MD;  [facility]    CV CORONARY ANGIOGRAM N/A 2/23/2022    Procedure: CV CORONARY ANGIOGRAM;  Surgeon: Yves Rodrigues MD;  [facility]    CV CORONARY ANGIOGRAM N/A 2/23/2023    Procedure: Coronary Angiogram;  Surgeon: Santino Mckeon MD;  [facility]    CV HEART BIOPSY N/A 03/04/2019    Procedure: Heart Biopsy;  Surgeon: Abhijeet Titus MD;  [facility]    CV HEART BIOPSY N/A 03/25/2019    Procedure: Heart Biopsy;  Surgeon: Yves Rodrigues MD;  [facility]    CV HEART BIOPSY N/A 03/28/2019    Procedure: Heart Cath Heart Biopsy;  Surgeon: Yves Rodrigues MD;  [facility]    CV HEART BIOPSY N/A 04/10/2019    Procedure: CV HEART BIOPSY;  Surgeon: Isiah Mcallister MD;  [facility]    CV HEART BIOPSY N/A 04/24/2019    Procedure: CV HEART BIOPSY;  Surgeon: Isiah Mcallister MD;  [facility]    CV HEART BIOPSY N/A 05/08/2019    Procedure: CV HEART BIOPSY;  Surgeon: Isiah Mcallister MD;  [facility]    CV HEART BIOPSY N/A 06/04/2019    Procedure: CV HEART BIOPSY;  Surgeon: Alton Solomon MD;  [facility]    CV HEART BIOPSY N/A 05/22/2019    Procedure: CV HEART BIOPSY;  Surgeon: Yves Rodrigues MD;  [facility]    CV HEART BIOPSY N/A 07/17/2019    Procedure: CV HEART BIOPSY;  Surgeon: Isiah Mcallister MD;  [facility]    CV HEART BIOPSY N/A 08/13/2019    Procedure: CV HEART BIOPSY;  Surgeon: Jackson Patten MD;  [facility]    CV HEART BIOPSY N/A 10/15/2019    Procedure: CV HEART BIOPSY;  Surgeon: Santino Mckeon MD;  [facility]    CV HEART BIOPSY N/A 02/12/2020    Procedure: CV HEART  BIOPSY;  Surgeon: Isiah Mcallister MD;  Location:  HEART CARDIAC CATH LAB    CV HEART BIOPSY N/A 05/05/2020    Procedure: CV HEART BIOPSY;  Surgeon: Yves Rodrigues MD;  [facility]    CV HEART BIOPSY N/A 03/17/2021    Procedure: CV HEART BIOPSY;  Surgeon: Santino Mckeon MD;  Location:  HEART CARDIAC CATH LAB    CV HEART BIOPSY N/A 5/10/2022    Procedure: Heart Biopsy;  Surgeon: Yves Rodrigues MD;  Location:  HEART CARDIAC CATH LAB    CV INTRA AORTIC BALLOON N/A 02/18/2019    Procedure: Intra-Aortic Balloon;  Surgeon: Alton Solomon MD;  Location:  HEART CARDIAC CATH LAB    CV INTRA AORTIC BALLOON N/A 02/20/2019    Procedure: Replace subclavian IABP;  Surgeon: Yves Rodrigues MD;  Location:  HEART CARDIAC CATH LAB    CV INTRAVASULAR ULTRASOUND N/A 02/12/2020    Procedure: CV INTRAVASCULAR ULTRASOUND;  Surgeon: Isiah Mcallister MD;  Location:  HEART CARDIAC CATH LAB    CV LEFT HEART CATH N/A 03/19/2019    Procedure: Left Heart Cath;  Surgeon: Yves Rodrigues MD;  Location:  HEART CARDIAC CATH LAB    CV LEFT HEART CATH N/A 02/12/2020    Procedure: Left Heart Cath;  Surgeon: Isiah Mcallister MD;  Location:  HEART CARDIAC CATH LAB    CV MYOCARDIAL BIOPSY N/A 03/19/2019    Procedure: RHC/HBx - Femoral access for 3/19 per Nimisha GONZALEZ;  Surgeon: Yves Rodrigues MD;  Location:  HEART CARDIAC CATH LAB    CV MYOCARDIAL BIOPSY N/A 03/11/2019    Procedure: ADD ON RHC/HBX;  Surgeon: Alton Solomon MD;  Location:  HEART CARDIAC CATH LAB    CV RIGHT HEART CATH MEASUREMENTS RECORDED N/A 03/25/2019    Procedure: Right Heart Cath;  Surgeon: Yves Rodrigues MD;  Location:  HEART CARDIAC CATH LAB    CV RIGHT HEART CATH MEASUREMENTS RECORDED N/A 03/28/2019    Procedure: Heart Cath Right Heart Cath;  Surgeon: Yves Rodrigues MD;  Location:  HEART CARDIAC CATH LAB    CV RIGHT HEART CATH MEASUREMENTS RECORDED N/A 04/24/2019    Procedure: CV  RIGHT HEART CATH;  Surgeon: Isiah Mcallister MD;  Location:  HEART CARDIAC CATH LAB    CV RIGHT HEART CATH MEASUREMENTS RECORDED N/A 06/04/2019    Procedure: CV RIGHT HEART CATH;  Surgeon: Alton Solomon MD;  Location:  HEART CARDIAC CATH LAB    CV RIGHT HEART CATH MEASUREMENTS RECORDED N/A 05/22/2019    Procedure: CV RIGHT HEART CATH;  Surgeon: Yves Rodrigues MD;  Location:  HEART CARDIAC CATH LAB    CV RIGHT HEART CATH MEASUREMENTS RECORDED N/A 08/13/2019    Procedure: CV RIGHT HEART CATH;  Surgeon: Jackson Patten MD;  Location:  HEART CARDIAC CATH LAB    CV RIGHT HEART CATH MEASUREMENTS RECORDED N/A 10/15/2019    Procedure: CV RIGHT HEART CATH;  Surgeon: Santino Mckeon MD;  Location:  HEART CARDIAC CATH LAB    CV RIGHT HEART CATH MEASUREMENTS RECORDED N/A 02/12/2020    Procedure: CV RIGHT HEART CATH;  Surgeon: Isiah Mcallister MD;  Location:  HEART CARDIAC CATH LAB    CV RIGHT HEART CATH MEASUREMENTS RECORDED N/A 03/17/2021    Procedure: CV RIGHT HEART CATH;  Surgeon: Santino Mckeon MD;  Location:  HEART CARDIAC CATH LAB    CV RIGHT HEART CATH MEASUREMENTS RECORDED N/A 2/23/2022    Procedure: CV RIGHT HEART CATH;  Surgeon: Yves Rodrigues MD;  Location:  HEART CARDIAC CATH LAB    CV RIGHT HEART CATH MEASUREMENTS RECORDED N/A 5/10/2022    Procedure: Right Heart Catheterization;  Surgeon: Yves Rodrigeus MD;  Location:  HEART CARDIAC CATH LAB    CV RIGHT HEART CATH MEASUREMENTS RECORDED N/A 2/23/2023    Procedure: Right Heart Catheterization;  Surgeon: Santino Mckeon MD;  Location:  HEART CARDIAC CATH LAB    ESOPHAGOSCOPY, GASTROSCOPY, DUODENOSCOPY (EGD), COMBINED N/A 12/19/2017    Procedure: ESOPHAGOGASTRODUODENOSCOPY (EGD) with biopsies;  Surgeon: Gael Romero MD;  Location: [facility];  Service:     ESOPHAGOSCOPY, GASTROSCOPY, DUODENOSCOPY (EGD), COMBINED N/A 3/8/2022    Procedure: ESOPHAGOGASTRODUODENOSCOPY, WITH BIOPSY;  Surgeon:  Paddy Saldivar DO;  Location: [facility]    H STATISTIC PICC LINE INSERTION >5YR, FAILED Bilateral 10/28/2021    L sided SVC    HAND SURGERY Left     HC REVISE MEDIAN N/CARPAL TUNNEL SURG  09/21/2016    Procedure: OPEN RIGHT CARPAL TUNNEL RELEASE CORTISONE INJECTION RIGHT THUMB;  Surgeon: Duong Santoyo MD;  Location: [facility];  Service: Orthopedics    INCISION AND DRAINAGE CHEST WASHOUT, COMBINED N/A 03/21/2019    Procedure: Incision And Drainage; Evacuation Right Chest Wall Hematoma;  Surgeon: Dewayne House MD;  [facility]    INSERT INTRAAORTIC BALLOON PUMP Left 02/19/2019    Procedure: Insert left  Subclavian Balloon Pump,  Removal Right femoral arterial balloom pump sheath;  Surgeon: Dewayne House MD;  [facility]    INSERT INTRAAORTIC BALLOON PUMP Left 02/21/2019    Procedure: SUBCLAVIAN BALLOON PUMP PLACEMENT;  Surgeon: Ben White MD;  [facility]    INSERT INTRACORONARY STENT      x2    IR PICC PLACEMENT > 5 YRS OF AGE  05/08/2019    TRANSPLANT HEART RECIPIENT N/A 02/24/2019    Procedure: TRANSPLANT HEART RECIPIENT;  Surgeon: Dewayne House MD;  [facility]     Immunizations:   Immunization History   Administered Date(s) Administered    COVID-19 12+ (2023-24) (Pfizer) 09/17/2023    COVID-19 Bivalent 12+ (Pfizer) 11/05/2022, 05/07/2023    COVID-19 MONOVALENT 12+ (Pfizer) 03/19/2021, 04/09/2021, 08/21/2021    COVID-19 Monovalent 12+ (Pfizer 2022) 04/02/2022    Influenza (IIV3) PF 10/22/2008, 09/25/2009, 11/07/2011, 11/30/2012, 10/27/2013, 10/02/2014    Influenza Vaccine >6 months (Alfuria,Fluzone) 10/02/2015, 09/20/2019, 09/16/2020, 09/28/2021, 11/12/2022    Influenza Vaccine, 6+MO IM (QUADRIVALENT W/PRESERVATIVES) 09/12/2016, 10/16/2017, 09/20/2018, 09/20/2019    Influenza,INJ,MDCK,PF,Quad >6mo(Flucelvax) 09/17/2023    OPV, trivalent, live 10/20/1978    Pneumo Conj 13-V (2010&after) 09/26/2018    Pneumococcal 23 valent 09/28/2019    TDAP Vaccine (Boostrix)  "07/20/2012    Td (Adult), Adsorbed 10/20/1978       ROS A comprehensive review of systems was performed and was otherwise negative    Medications, allergies, and problem list were reviewed and updated    Exam  /82   Pulse 104   Temp 98.3  F (36.8  C)   Resp 18   Ht 1.708 m (5' 7.25\")   Wt 87.1 kg (192 lb)   SpO2 98%   BMI 29.85 kg/m    Appears well.  Good mood and affect.  Mobility normal.  Lungs are clear to auscultation without wheezing.  Heart is tachycardic which is stable for his heart transplant.  But no murmur rub or gallop.  Abdomen is nontender and no edema    Assessment/Plan  1. Intermittent asthma without complication, unspecified asthma severity  Well-controlled.  Continue current treatment.    He does have a chronic cough with postnasal drip.  He has used [place] pot and Flonase in the past.      - albuterol (PROAIR HFA/PROVENTIL HFA/VENTOLIN HFA) 108 (90 Base) MCG/ACT inhaler; Inhale 2 puffs into the lungs every 6 hours as needed for shortness of breath or wheezing  Dispense: 18 g; Refill: 5  - fluticasone (FLOVENT HFA) 220 MCG/ACT inhaler; Inhale 2 puffs into the lungs 2 times daily  Dispense: 12 g; Refill: 3    Given his heart transplant and asthma, I did recommend he consider the RSV vaccine  - respiratory syncytial virus vaccine, bivalent (ABRYSVO) injection; Inject 0.5 mLs into the muscle once for 1 dose  Dispense: 0.5 mL; Refill: 0    2. Hypothyroidism due to medication  Recent dose instability due to running out of medication, but will go back to his usual dose.  Recheck TSH in February when he checks his other lab work  - levothyroxine (SYNTHROID/LEVOTHROID) 75 MCG tablet; Take 1 tablet (75 mcg) by mouth daily  Dispense: 90 tablet; Refill: 3  - TSH; Future    3. Subacute cough  Chronic cough.  More of a postnasal drip condition.  Restart Flonase and [place] pot  - fluticasone (FLOVENT HFA) 220 MCG/ACT inhaler; Inhale 2 puffs into the lungs 2 times daily  Dispense: 12 g; Refill: " 3    4. Chronic kidney disease, stage 3b (H)  Recheck labs in February with transplant.  Patient was offered lab work rechecked today but he declined    5. Need for prophylactic vaccination and inoculation against respiratory syncytial virus (RSV)    - respiratory syncytial virus vaccine, bivalent (ABRYSVO) injection; Inject 0.5 mLs into the muscle once for 1 dose  Dispense: 0.5 mL; Refill: 0    6. History of heart transplant (H)    - respiratory syncytial virus vaccine, bivalent (ABRYSVO) injection; Inject 0.5 mLs into the muscle once for 1 dose  Dispense: 0.5 mL; Refill: 0      Return in about 6 months (around 5/8/2024) for Health maintenance physical exam.   Patient Instructions   No change in treatment plan.    I would recommend the RSV vaccine, which can be obtained at local pharmacy.    Consider the Shingrix shingles vaccine next year.    Check your TSH when you are in for blood work in February.    See me for health and physical exam in approximately 6 months.    Fredrick Wolff MD, MD        Current Outpatient Medications   Medication Sig Dispense Refill    acetaminophen (TYLENOL) 325 MG tablet Take 2 tablets (650 mg) by mouth every 4 hours as needed for mild pain      albuterol (PROAIR HFA/PROVENTIL HFA/VENTOLIN HFA) 108 (90 Base) MCG/ACT inhaler Inhale 2 puffs into the lungs every 6 hours as needed for shortness of breath or wheezing 18 g 5    amLODIPine (NORVASC) 5 MG tablet Take 2 tablets (10 mg) by mouth daily 180 tablet 11    aspirin (ASA) 81 MG chewable tablet Take 1 tablet (81 mg) by mouth daily      azaTHIOprine 100 MG TABS Take 100 mg by mouth daily 90 tablet 1    calcium carbonate 600 mg-vitamin D 400 units (CALTRATE) 600-400 MG-UNIT per tablet Take 1 tablet by mouth 2 times daily (with meals)      cetirizine (ZYRTEC) 10 MG tablet Take 10 mg by mouth daily      clindamycin (CLINDAMAX) 1 % external gel Apply twice daily as needed for acne on the spots 60 g 11    DULoxetine (CYMBALTA) 20 MG capsule  Take 1 capsule (20 mg) by mouth daily 90 capsule 3    fluticasone (FLOVENT HFA) 220 MCG/ACT inhaler Inhale 2 puffs into the lungs 2 times daily 12 g 3    levothyroxine (SYNTHROID/LEVOTHROID) 75 MCG tablet Take 1 tablet (75 mcg) by mouth daily 90 tablet 3    losartan (COZAAR) 25 MG tablet Take 3 tablets (75 mg) by mouth daily 270 tablet 3    melatonin 3 MG tablet Take 2 tablets (6 mg) by mouth At Bedtime (Patient taking differently: Take 3 mg by mouth nightly as needed)      multivitamin w/minerals (THERA-VIT-M) tablet Take 1 tablet by mouth daily      omeprazole (PRILOSEC) 40 MG DR capsule Take 1 capsule (40 mg) by mouth daily 90 capsule 3    respiratory syncytial virus vaccine, bivalent (ABRYSVO) injection Inject 0.5 mLs into the muscle once for 1 dose 0.5 mL 0    RESTASIS 0.05 % ophthalmic emulsion Place 1 drop into both eyes 2 times daily      rosuvastatin (CRESTOR) 40 MG tablet Take 1 tablet (40 mg) by mouth daily 90 tablet 3    sildenafil (VIAGRA) 50 MG tablet Take 1 tablet (50 mg) by mouth daily as needed (Erectile dysfunction) 10 tablet 11    sirolimus (GENERIC EQUIVALENT) 1 MG tablet Take 3 tablets (3 mg) by mouth daily 270 tablet 11    tadalafil (CIALIS) 10 MG tablet Take 1 tablet (10 mg) by mouth daily as needed (Erectile dysfunction) 10 tablet 3     Allergies   Allergen Reactions    Hydromorphone Other (See Comments)     Significant Delirium    Adhesive Tape Blisters     Tegaderm causes bruises, blisters and extreme irritation.    Lisinopril Other (See Comments)     hypotension    Quinolones Other (See Comments)     Would not rx quinolones until QTc interval improved.     Tobramycin Other (See Comments)     Would not use aminoglycosides as his kidney function is very borderline    Codeine Nausea     Social History     Tobacco Use    Smoking status: Former     Packs/day: 1.00     Years: 25.00     Additional pack years: 0.00     Total pack years: 25.00     Types: Cigarettes     Start date: 1/1/1980     Quit  date: 11/6/2008     Years since quitting: 15.0     Passive exposure: Past    Smokeless tobacco: Never   Vaping Use    Vaping Use: Never used   Substance Use Topics    Alcohol use: Yes     Alcohol/week: 1.0 - 2.0 standard drink of alcohol     Types: 1 - 2 Cans of beer per week    Drug use: No             Subjective   Everton is a 58 year old, presenting for the following health issues:  Follow Up (Refills.)      11/8/2023    10:10 AM   Additional Questions   Roomed by Rosalind WILLSON   Accompanied by rafal       History of Present Illness       Vascular Disease:  He presents for follow up of vascular disease.     He never takes nitroglycerin. He takes daily aspirin.    He eats 2-3 servings of fruits and vegetables daily.He consumes 0 sweetened beverage(s) daily.He exercises with enough effort to increase his heart rate 30 to 60 minutes per day.  He exercises with enough effort to increase his heart rate 6 days per week.   He is taking medications regularly.                 Review of Systems         Objective    There were no vitals taken for this visit.  There is no height or weight on file to calculate BMI.  Physical Exam

## 2023-11-08 NOTE — PATIENT INSTRUCTIONS
No change in treatment plan.    I would recommend the RSV vaccine, which can be obtained at local pharmacy.    Consider the Shingrix shingles vaccine next year.    Check your TSH when you are in for blood work in February.    See me for health and physical exam in approximately 6 months.

## 2023-11-11 ENCOUNTER — HEALTH MAINTENANCE LETTER (OUTPATIENT)
Age: 58
End: 2023-11-11

## 2023-11-18 ENCOUNTER — TRANSFERRED RECORDS (OUTPATIENT)
Dept: MULTI SPECIALTY CLINIC | Facility: CLINIC | Age: 58
End: 2023-11-18

## 2023-11-18 LAB — RETINOPATHY: NORMAL

## 2023-11-21 NOTE — PROGRESS NOTES
RT Protocol Note  Iram Barrier 66 y.o. female MRN: [id number]  Unit/Bed#: -01 Encounter: [id number]    Assessment    Principal Problem:    Chronic obstructive pulmonary disease with acute exacerbation ([address])  Active Problems:    Essential hypertension    Tobacco abuse    Chronic respiratory failure with hypoxia (HCC)    CAD (coronary artery disease), native coronary artery    Major depressive disorder, recurrent episode, moderate ([address])      Home Pulmonary Medications:     11/20/23 1954   Respiratory Protocol   Protocol Initiated? No   Protocol Selection Respiratory   Language Barrier? No   Medical & Social History Reviewed? Yes   Diagnostic Studies Reviewed? Yes   Physical Assessment Performed? Yes   Home Devices/Therapy Home O2   Respiratory Plan Mild Distress pathway   Respiratory Assessment   Assessment Type During-treatment   General Appearance Alert; Awake   Respiratory Pattern Normal   Chest Assessment Chest expansion symmetrical   Bilateral Breath Sounds Diminished   Location Specific No   Cough None   Resp Comments Resp protocol completed.  Continue with tx plan   O2 Device NC   Cough Description   Sputum Amount None   Additional Assessments   Pulse (!) 12   Respirations 20   SpO2 98 %       Home Devices/Therapy: Home O2    Past Medical History:   Diagnosis Date    Acute congestive heart failure (HCC) 8/27/2021    Anxiety     Cardiac disease     Chest pain 8/27/2021    Chronic pain disorder     COPD (chronic obstructive pulmonary disease) (HCC)     Depression     Heart disease     Hyperlipidemia     Hypertension     MI (myocardial infarction) ([address])     MRSA (methicillin resistant Staphylococcus aureus)     Renal disorder     benign kidney tumor     Social History     Socioeconomic History    Marital status:      Spouse name: None    Number of children: 3    Years of education: 13    Highest education level: High school graduate   Occupational History    Occupation: [address]      Employer: [employer].  Please see link for full device report.  Patient was informed of results and next follow up via mail.     CAROLINE LOU RESORT     Comment: retired    Tobacco Use    Smoking status: Former     Packs/day: 1.00     Years: 60.00     Total pack years: 60.00     Types: Cigarettes     Quit date: 2016     Years since quittin.8    Smokeless tobacco: Never    Tobacco comments:     She has close to a 60 pack-year smoking history, most recently has cut down to 4-5 cigarettes daily   Vaping Use    Vaping Use: Never used   Substance and Sexual Activity    Alcohol use: Never    Drug use: No    Sexual activity: Not Currently   Other Topics Concern    None   Social History Narrative    None     Social Determinants of Health     Financial Resource Strain: Medium Risk (1/3/2020)    Overall Financial Resource Strain (CARDIA)     Difficulty of Paying Living Expenses: Somewhat hard   Food Insecurity: No Food Insecurity (2023)    Hunger Vital Sign     Worried About Running Out of Food in the Last Year: Never true     Ran Out of Food in the Last Year: Never true   Transportation Needs: No Transportation Needs (2023)    PRAPARE - Transportation     Lack of Transportation (Medical): No     Lack of Transportation (Non-Medical): No   Physical Activity: Inactive (1/3/2020)    Exercise Vital Sign     Days of Exercise per Week: 0 days     Minutes of Exercise per Session: 0 min   Stress: No Stress Concern Present (1/3/2020)    [facility]     Feeling of Stress :  Only a little   Social Connections: Socially Isolated (1/3/2020)    Social Connection and Isolation Panel [NHANES]     Frequency of Communication with Friends and Family: Twice a week     Frequency of Social Gatherings with Friends and Family: Once a week     Attends Christianity Services: Never     Active Member of Clubs or Organizations: No     Attends Club or Organization Meetings: Never     Marital Status:    Intimate Partner Violence: Not At Risk (1/3/2020)    Humiliation, Afraid, Rape, and Kick questionnaire     Fear of Current or Ex-Partner: No     Emotionally Abused: No     Physically Abused: No     Sexually Abused: No   Housing Stability: Low Risk  (11/20/2023)    Housing Stability Vital Sign     Unable to Pay for Housing in the Last Year: No     Number of Places Lived in the Last Year: 2     Unstable Housing in the Last Year: No       Subjective         Objective    Physical Exam:   Assessment Type: During-treatment  General Appearance: Alert, Awake  Respiratory Pattern: Normal  Chest Assessment: Chest expansion symmetrical  Bilateral Breath Sounds: Diminished  Location Specific: No  Cough: None  O2 Device: NC    Vitals:  Blood pressure 128/67, pulse (!) 12, temperature 98 °F (36.7 °C), resp. rate 20, height 5' 8" (1.727 m), weight 55.2 kg (121 lb 11.1 oz), SpO2 98 %, not currently breastfeeding. Imaging and other studies: I have personally reviewed pertinent reports. O2 Device: NC     Plan    Respiratory Plan: Mild Distress pathway        Resp Comments: Resp protocol completed.  Continue with tx plan

## 2023-11-22 ENCOUNTER — THERAPY VISIT (OUTPATIENT)
Dept: PHYSICAL THERAPY | Facility: REHABILITATION | Age: 58
End: 2023-11-22
Payer: COMMERCIAL

## 2023-11-22 DIAGNOSIS — M75.101 ROTATOR CUFF SYNDROME OF BOTH SHOULDERS: ICD-10-CM

## 2023-11-22 DIAGNOSIS — M75.102 ROTATOR CUFF SYNDROME OF BOTH SHOULDERS: ICD-10-CM

## 2023-11-22 DIAGNOSIS — M25.811 SHOULDER IMPINGEMENT, RIGHT: ICD-10-CM

## 2023-11-22 DIAGNOSIS — M19.011 PRIMARY OSTEOARTHRITIS OF BOTH SHOULDERS: ICD-10-CM

## 2023-11-22 DIAGNOSIS — M75.42 IMPINGEMENT SYNDROME OF LEFT SHOULDER: Primary | ICD-10-CM

## 2023-11-22 DIAGNOSIS — M19.012 PRIMARY OSTEOARTHRITIS OF BOTH SHOULDERS: ICD-10-CM

## 2023-11-22 PROCEDURE — 97110 THERAPEUTIC EXERCISES: CPT | Mod: GP

## 2023-12-06 ENCOUNTER — THERAPY VISIT (OUTPATIENT)
Dept: PHYSICAL THERAPY | Facility: REHABILITATION | Age: 58
End: 2023-12-06
Payer: COMMERCIAL

## 2023-12-06 DIAGNOSIS — M75.101 ROTATOR CUFF SYNDROME OF BOTH SHOULDERS: ICD-10-CM

## 2023-12-06 DIAGNOSIS — M75.102 ROTATOR CUFF SYNDROME OF BOTH SHOULDERS: ICD-10-CM

## 2023-12-06 DIAGNOSIS — M25.811 SHOULDER IMPINGEMENT, RIGHT: ICD-10-CM

## 2023-12-06 DIAGNOSIS — M75.42 IMPINGEMENT SYNDROME OF LEFT SHOULDER: Primary | ICD-10-CM

## 2023-12-06 PROCEDURE — 97110 THERAPEUTIC EXERCISES: CPT | Mod: GP

## 2023-12-06 NOTE — PROGRESS NOTES
DISCHARGE  Reason for Discharge: Patient has met all goals.  Patient chooses to discontinue therapy.    Equipment Issued: none    Discharge Plan: Patient to continue home program.  Pt to receive new referral for PT from doctor to schedule more PT appointments.    Referring Provider:  Gael Merritt         12/06/23 0500   Appointment Info   Signing clinician's name / credentials Monique Rudd, PT, DPT   Visits Used 4   Medical Diagnosis Impingement syndrome of left shoulder; shoulder impingement, right; rotator cuff syndrome of both shoulders; primary osteoarthritis of both shoulders   PT Tx Diagnosis Bilateral shoulder pain   Progress Note/Certification   Onset of illness/injury or Date of Surgery 10/04/23   Therapy Frequency 1 time a week   Predicted Duration 1 time a week or every other week for a minimum of 12 weeks, minimum of 6 sessions   Progress Note Due Date 11/16/23  (or 10th visit)   GOALS   PT Goals 2;3   PT Goal 1   Goal Identifier HEP   Goal Description Pt will be independent with HEP to improve mobility and decrease pain.   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   Goal Progress Goal Met- pt reports that he could do it, but wouldn't be sore until the end of a work day   Target Date 01/09/24   Date Met 12/06/23   PT Goal 3   Goal Identifier Sleeping   Goal Description Pt will report ability to sleep on right side with less than 2/10 pain to improve functional mobility and increase tolerance completing ADL's, recreational activities and work related tasks.   Goal Progress Goal Met- pt reports that sleeping has been better, but sometimes has slept in wrong positions and had pain   Target Date 01/09/24   Date Met  12/06/23   Subjective Report   Subjective Report Pt reports that his shoulders have been better. Pt reports that he has had some soreness in his shoulders, but they are bettter than before. Pt reports that he has all the tools he needs to improve his shoulder. Pt okay with discharging from PT at this time. Pt reports that his foot does still hurt, but overall is better.   Objective Measures   Objective Measures Objective Measure 2;Objective Measure 3   Objective Measure 2   Objective Measure Shoulder ROM   Details L flexion: 155 degrees about 2/10 pain throughout whole motion, L abduction: 155 degrees about the same, L ER: 82 degrees, L IR: T7 painful; R flexion: 155 degrees, R abduction: 152 degrees, R ER: 75 degrees, R IR: T9   Objective Measure 3   Objective Measure Special tests   Details Neer's: Right positive; Hawkin's-Pete: Right positive; Empty can: negative bilaterally; Full can: negative bilaterally   Treatment Interventions (PT)   Interventions Therapeutic Procedure/Exercise;Neuromuscular Re-education   Therapeutic Procedure/Exercise   Therapeutic Procedures: strength, endurance, ROM, flexibillity minutes (50192) 40   Ther Proc 1 To improve functional mobility: UBE x lvl 10 x 2 minutes CW/CCW, review over HEP, pulleys x 4 minutes- pt reports good stretch in shoulders, theraband shoulder IR at 90/90 x 10B x lvl 3 therband, theraband shoulder ER at 90/90 x 10B x lvl 3 theraband, shoulder internal rotation resisted eccentric x 10B x lvl 3 theraband, shoulder interal rotation assisted   Skilled Intervention Education on exercises for HEP. Verbal cues, tactile cues and demonstration of correct technique. Pt educated to complete exercises within pain. Pt educated to stop exercises if they cause an increase in pain.   Patient Response/Progress Tolerated well   Education   Learner/Method Patient;Demonstration;Pictures/Video   Plan   Home program PTRx   Plan for next session Discharge PT   Comments   Comments  Pt is a 58 year old male who presents to physical therapy with improved bilateral shoulder pain. Pt met all goals. Pt to discharge from PT at this time. Pt to receive new referral for PT from doctor to schedule more PT appts.   Total Session Time   Timed Code Treatment Minutes 40   Total Treatment Time (sum of timed and untimed services) 40     Monique Rudd, PT, DPT

## 2023-12-28 DIAGNOSIS — Z94.1 TRANSPLANTED HEART (H): ICD-10-CM

## 2023-12-28 DIAGNOSIS — R11.0 NAUSEA: ICD-10-CM

## 2023-12-28 RX ORDER — AZATHIOPRINE 100 MG/1
100 TABLET ORAL DAILY
Qty: 90 TABLET | Refills: 3 | Status: SHIPPED | OUTPATIENT
Start: 2023-12-28 | End: 2024-02-23 | Stop reason: DRUGHIGH

## 2023-12-29 RX ORDER — OMEPRAZOLE 40 MG/1
40 CAPSULE, DELAYED RELEASE ORAL DAILY
Qty: 30 CAPSULE | Refills: 11 | Status: SHIPPED | OUTPATIENT
Start: 2023-12-29

## 2024-01-09 ENCOUNTER — TELEPHONE (OUTPATIENT)
Dept: TRANSPLANT | Facility: CLINIC | Age: 59
End: 2024-01-09
Payer: COMMERCIAL

## 2024-01-10 DIAGNOSIS — Z94.1 TRANSPLANTED HEART (H): Primary | ICD-10-CM

## 2024-01-10 NOTE — TELEPHONE ENCOUNTER
Patient Call: General  Route to LPN    Reason for call: Patient stated he's returning Coordinator's call, and is needing to schedule appointment.    Call back needed? Yes    Return Call Needed  Same as documented in contacts section  When to return call?: Same day: Route High Priority

## 2024-01-12 DIAGNOSIS — Z94.1 TRANSPLANTED HEART (H): ICD-10-CM

## 2024-01-12 DIAGNOSIS — E11.9 DIABETES MELLITUS (H): ICD-10-CM

## 2024-01-12 DIAGNOSIS — Z12.5 PROSTATE CANCER SCREENING: Primary | ICD-10-CM

## 2024-01-12 DIAGNOSIS — Z13.29 THYROID DISORDER SCREENING: ICD-10-CM

## 2024-01-12 DIAGNOSIS — Z13.220 LIPID SCREENING: ICD-10-CM

## 2024-01-12 NOTE — PROGRESS NOTES
Call returned. Pt doing good but is having some mental health things he would like referral for. Health psychology referral submitted. Appointment times and instructions reviewed for upcoming on 2/21

## 2024-01-19 ENCOUNTER — TELEPHONE (OUTPATIENT)
Dept: TRANSPLANT | Facility: CLINIC | Age: 59
End: 2024-01-19
Payer: COMMERCIAL

## 2024-01-19 NOTE — TELEPHONE ENCOUNTER
Pt tested positive for covid. Cough, aches, temp of 100.8. Per dr. Lindsay, ok to decrease imuran to 50 mg daily. Per pharmacy ok to cut current imuran tablets but be careful no one else is around to inhale medication. Above communicated to patient via studdexhart.

## 2024-01-20 ENCOUNTER — HEALTH MAINTENANCE LETTER (OUTPATIENT)
Age: 59
End: 2024-01-20

## 2024-02-07 NOTE — PROGRESS NOTES
02/26/19 1600   Quick Adds   Type of Visit Initial PT Evaluation   Living Environment   Lives With alone   Living Arrangements apartment   Home Accessibility no concerns   Transportation Anticipated family or friend will provide   Living Environment Comment Pt will d/c to parents house which has 8 steps to enter and 12 steps to bedroom.  IND with all mobility and ADL's at baseline.  Worked 15 hrs at [employer] prior to admission. `   Self-Care   Usual Activity Tolerance good   Current Activity Tolerance fair   Regular Exercise No   Equipment Currently Used at Home none   Functional Level Prior   Ambulation 0-->independent   Transferring 0-->independent   Toileting 0-->independent   Bathing 0-->independent   Communication 0-->understands/communicates without difficulty   Swallowing 0-->swallows foods/liquids without difficulty   Cognition 0 - no cognition issues reported   Fall history within last six months no   General Information   Onset of Illness/Injury or Date of Surgery - Date 02/17/19   Referring Physician Carla Iniguez MD   Patient/Family Goals Statement none stated    Pertinent History of Current Problem (include personal factors and/or comorbidities that impact the POC) 53 year old male with PMH etoh abuse, hypothyroidism, intermittent asthma, ulcerative colitis, Depression, Chronic HFrEF, NICM admitted in cardiogenic shock requiring subclavian balloon pump now s/p OHT 2/24/19 with [facility] and [facility].    Precautions/Limitations fall precautions;sternal precautions   Heart Disease Risk Factors Gender;Medical history   Cognitive Status Examination   Orientation orientation to person, place and time   Level of Consciousness alert   Follows Commands and Answers Questions 100% of the time   Personal Safety and Judgment intact   Memory intact   Posture    Posture Kyphosis   Range of Motion (ROM)   ROM Comment WFL   Strength   Strength Comments not formally assessed but WFL   Bed Mobility   Bed Mobility  "Comments Supine to sit with HOB elevated 45 deg with min Ax1   Transfer Skills   Transfer Comments STS with CGAx1    Gait   Gait Comments Ambulates 3ft forward and back with CGAx1, no AD   General Therapy Interventions   Planned Therapy Interventions balance training;bed mobility training;gait training;motor coordination training;neuromuscular re-education;strengthening;transfer training;risk factor education;home program guidelines;progressive activity/exercise   Clinical Impression   Criteria for Skilled Therapeutic Intervention yes, treatment indicated   PT Diagnosis impaired functional mobility    Influenced by the following impairments impaired gait, weakness, sternal pain   Functional limitations due to impairments IND mobility   Clinical Presentation Evolving/Changing   Clinical Presentation Rationale clinical judgemebt   Clinical Decision Making (Complexity) Moderate complexity   Therapy Frequency` 5 times/week   Predicted Duration of Therapy Intervention (days/wks) 3/14/19   Anticipated Discharge Disposition Home with Assist;Home with Outpatient Therapy   Risk & Benefits of therapy have been explained Yes   Patient, Family & other staff in agreement with plan of care Yes   [facility] [employer] TM \"6 Clicks\"   2016, Trustees of [facility], under license to [employer].  All rights reserved.   6 Clicks Short Forms Daily Activity Inpatient Short Form;Basic Mobility Inpatient Short Form   [facility] [employer]  \"6 Clicks\" V.2 Basic Mobility Inpatient Short Form   1. Turning from your back to your side while in a flat bed without using bedrails? 3 - A Little   2. Moving from lying on your back to sitting on the side of a flat bed without using bedrails? 2 - A Lot   3. Moving to and from a bed to a chair (including a wheelchair)? 3 - A Little   4. Standing up from a chair using your arms (e.g., wheelchair, or bedside chair)? 3 - A Little   5. To walk in hospital room? 3 - A Little   6. Climbing 3-5 " "steps with a railing? 2 - A Lot   Basic Mobility Raw Score (Score out of 24.Lower scores equate to lower levels of function) 16   [facility] AM-PAC  \"6 Clicks\" Daily Activity Inpatient Short Form   1. Putting on and taking off regular lower body clothing? 2 - A Lot   2. Bathing (including washing, rinsing, drying)? 3 - A Little   3. Toileting, which includes using toilet, bedpan or urinal? 2 - A Lot   4. Putting on and taking off regular upper body clothing? 3 - A Little   5. Taking care of personal grooming such as brushing teeth? 3 - A Little   6. Eating meals? 3 - A Little   Daily Activity Raw Score (Score out of 24.Lower scores equate to lower levels of function) 16   Total Evaluation Time   Total Evaluation Time (Minutes) 10     " No indicators present

## 2024-02-21 ENCOUNTER — OFFICE VISIT (OUTPATIENT)
Dept: CARDIOLOGY | Facility: CLINIC | Age: 59
End: 2024-02-21
Attending: STUDENT IN AN ORGANIZED HEALTH CARE EDUCATION/TRAINING PROGRAM
Payer: COMMERCIAL

## 2024-02-21 ENCOUNTER — LAB (OUTPATIENT)
Dept: LAB | Facility: CLINIC | Age: 59
End: 2024-02-21
Attending: STUDENT IN AN ORGANIZED HEALTH CARE EDUCATION/TRAINING PROGRAM
Payer: COMMERCIAL

## 2024-02-21 ENCOUNTER — ANCILLARY PROCEDURE (OUTPATIENT)
Dept: GENERAL RADIOLOGY | Facility: CLINIC | Age: 59
End: 2024-02-21
Attending: STUDENT IN AN ORGANIZED HEALTH CARE EDUCATION/TRAINING PROGRAM
Payer: COMMERCIAL

## 2024-02-21 VITALS
OXYGEN SATURATION: 99 % | SYSTOLIC BLOOD PRESSURE: 139 MMHG | BODY MASS INDEX: 29.89 KG/M2 | DIASTOLIC BLOOD PRESSURE: 87 MMHG | WEIGHT: 192.3 LBS | HEART RATE: 102 BPM

## 2024-02-21 DIAGNOSIS — N18.32 CHRONIC KIDNEY DISEASE, STAGE 3B (H): ICD-10-CM

## 2024-02-21 DIAGNOSIS — Z94.1 TRANSPLANTED HEART (H): ICD-10-CM

## 2024-02-21 DIAGNOSIS — I10 BENIGN ESSENTIAL HYPERTENSION: ICD-10-CM

## 2024-02-21 DIAGNOSIS — E03.2 HYPOTHYROIDISM DUE TO MEDICATION: Chronic | ICD-10-CM

## 2024-02-21 DIAGNOSIS — Z12.5 PROSTATE CANCER SCREENING: ICD-10-CM

## 2024-02-21 DIAGNOSIS — E11.9 DIABETES MELLITUS (H): ICD-10-CM

## 2024-02-21 DIAGNOSIS — Z13.29 THYROID DISORDER SCREENING: ICD-10-CM

## 2024-02-21 DIAGNOSIS — Z94.1 TRANSPLANTED HEART (H): Primary | ICD-10-CM

## 2024-02-21 DIAGNOSIS — Z13.220 LIPID SCREENING: ICD-10-CM

## 2024-02-21 DIAGNOSIS — D84.9 IMMUNOSUPPRESSION (H): ICD-10-CM

## 2024-02-21 LAB
ALBUMIN SERPL BCG-MCNC: 4.4 G/DL (ref 3.5–5.2)
ALP SERPL-CCNC: 116 U/L (ref 40–150)
ALT SERPL W P-5'-P-CCNC: 24 U/L (ref 0–70)
ANION GAP SERPL CALCULATED.3IONS-SCNC: 12 MMOL/L (ref 7–15)
AST SERPL W P-5'-P-CCNC: 24 U/L (ref 0–45)
BASOPHILS # BLD AUTO: 0 10E3/UL (ref 0–0.2)
BASOPHILS NFR BLD AUTO: 0 %
BILIRUB SERPL-MCNC: 0.3 MG/DL
BUN SERPL-MCNC: 30.6 MG/DL (ref 8–23)
CALCIUM SERPL-MCNC: 9.7 MG/DL (ref 8.8–10.2)
CHLORIDE SERPL-SCNC: 103 MMOL/L (ref 98–107)
CHOLEST SERPL-MCNC: 223 MG/DL
CK SERPL-CCNC: 169 U/L (ref 39–308)
CMV DNA SPEC NAA+PROBE-ACNC: NOT DETECTED IU/ML
CREAT SERPL-MCNC: 2.33 MG/DL (ref 0.67–1.17)
DEPRECATED HCO3 PLAS-SCNC: 23 MMOL/L (ref 22–29)
EGFRCR SERPLBLD CKD-EPI 2021: 31 ML/MIN/1.73M2
EOSINOPHIL # BLD AUTO: 0.2 10E3/UL (ref 0–0.7)
EOSINOPHIL NFR BLD AUTO: 4 %
ERYTHROCYTE [DISTWIDTH] IN BLOOD BY AUTOMATED COUNT: 14.5 % (ref 10–15)
FASTING STATUS PATIENT QL REPORTED: ABNORMAL
GLUCOSE SERPL-MCNC: 95 MG/DL (ref 70–99)
HBA1C MFR BLD: 6.2 %
HCT VFR BLD AUTO: 38.3 % (ref 40–53)
HDLC SERPL-MCNC: 73 MG/DL
HGB BLD-MCNC: 12.4 G/DL (ref 13.3–17.7)
IMM GRANULOCYTES # BLD: 0 10E3/UL
IMM GRANULOCYTES NFR BLD: 0 %
LDLC SERPL CALC-MCNC: 119 MG/DL
LYMPHOCYTES # BLD AUTO: 0.9 10E3/UL (ref 0.8–5.3)
LYMPHOCYTES NFR BLD AUTO: 19 %
MAGNESIUM SERPL-MCNC: 2.1 MG/DL (ref 1.7–2.3)
MCH RBC QN AUTO: 26.7 PG (ref 26.5–33)
MCHC RBC AUTO-ENTMCNC: 32.4 G/DL (ref 31.5–36.5)
MCV RBC AUTO: 83 FL (ref 78–100)
MONOCYTES # BLD AUTO: 0.4 10E3/UL (ref 0–1.3)
MONOCYTES NFR BLD AUTO: 8 %
NEUTROPHILS # BLD AUTO: 3.1 10E3/UL (ref 1.6–8.3)
NEUTROPHILS NFR BLD AUTO: 69 %
NONHDLC SERPL-MCNC: 150 MG/DL
NRBC # BLD AUTO: 0 10E3/UL
NRBC BLD AUTO-RTO: 0 /100
PHOSPHATE SERPL-MCNC: 3.4 MG/DL (ref 2.5–4.5)
PLATELET # BLD AUTO: 170 10E3/UL (ref 150–450)
POTASSIUM SERPL-SCNC: 4.8 MMOL/L (ref 3.4–5.3)
PROT SERPL-MCNC: 8.1 G/DL (ref 6.4–8.3)
PSA SERPL DL<=0.01 NG/ML-MCNC: 0.91 NG/ML (ref 0–4.5)
RBC # BLD AUTO: 4.64 10E6/UL (ref 4.4–5.9)
SIROLIMUS BLD-MCNC: 12 UG/L (ref 5–15)
SODIUM SERPL-SCNC: 138 MMOL/L (ref 135–145)
TME LAST DOSE: NORMAL H
TME LAST DOSE: NORMAL H
TRIGL SERPL-MCNC: 157 MG/DL
TSH SERPL DL<=0.005 MIU/L-ACNC: 0.43 UIU/ML (ref 0.3–4.2)
WBC # BLD AUTO: 4.5 10E3/UL (ref 4–11)

## 2024-02-21 PROCEDURE — 99000 SPECIMEN HANDLING OFFICE-LAB: CPT | Performed by: PATHOLOGY

## 2024-02-21 PROCEDURE — 86832 HLA CLASS I HIGH DEFIN QUAL: CPT | Performed by: STUDENT IN AN ORGANIZED HEALTH CARE EDUCATION/TRAINING PROGRAM

## 2024-02-21 PROCEDURE — G2211 COMPLEX E/M VISIT ADD ON: HCPCS | Performed by: STUDENT IN AN ORGANIZED HEALTH CARE EDUCATION/TRAINING PROGRAM

## 2024-02-21 PROCEDURE — 80061 LIPID PANEL: CPT | Performed by: PATHOLOGY

## 2024-02-21 PROCEDURE — 85025 COMPLETE CBC W/AUTO DIFF WBC: CPT | Performed by: PATHOLOGY

## 2024-02-21 PROCEDURE — G0103 PSA SCREENING: HCPCS | Performed by: PATHOLOGY

## 2024-02-21 PROCEDURE — 80195 ASSAY OF SIROLIMUS: CPT | Performed by: STUDENT IN AN ORGANIZED HEALTH CARE EDUCATION/TRAINING PROGRAM

## 2024-02-21 PROCEDURE — 84100 ASSAY OF PHOSPHORUS: CPT | Performed by: PATHOLOGY

## 2024-02-21 PROCEDURE — 87799 DETECT AGENT NOS DNA QUANT: CPT | Performed by: STUDENT IN AN ORGANIZED HEALTH CARE EDUCATION/TRAINING PROGRAM

## 2024-02-21 PROCEDURE — 71046 X-RAY EXAM CHEST 2 VIEWS: CPT | Performed by: RADIOLOGY

## 2024-02-21 PROCEDURE — 80053 COMPREHEN METABOLIC PANEL: CPT | Performed by: PATHOLOGY

## 2024-02-21 PROCEDURE — 86833 HLA CLASS II HIGH DEFIN QUAL: CPT | Performed by: STUDENT IN AN ORGANIZED HEALTH CARE EDUCATION/TRAINING PROGRAM

## 2024-02-21 PROCEDURE — 84443 ASSAY THYROID STIM HORMONE: CPT | Performed by: PATHOLOGY

## 2024-02-21 PROCEDURE — 83735 ASSAY OF MAGNESIUM: CPT | Performed by: PATHOLOGY

## 2024-02-21 PROCEDURE — 99213 OFFICE O/P EST LOW 20 MIN: CPT | Performed by: STUDENT IN AN ORGANIZED HEALTH CARE EDUCATION/TRAINING PROGRAM

## 2024-02-21 PROCEDURE — 83036 HEMOGLOBIN GLYCOSYLATED A1C: CPT | Performed by: STUDENT IN AN ORGANIZED HEALTH CARE EDUCATION/TRAINING PROGRAM

## 2024-02-21 PROCEDURE — 86352 CELL FUNCTION ASSAY W/STIM: CPT | Performed by: STUDENT IN AN ORGANIZED HEALTH CARE EDUCATION/TRAINING PROGRAM

## 2024-02-21 PROCEDURE — 82550 ASSAY OF CK (CPK): CPT | Performed by: PATHOLOGY

## 2024-02-21 PROCEDURE — 36415 COLL VENOUS BLD VENIPUNCTURE: CPT | Performed by: PATHOLOGY

## 2024-02-21 PROCEDURE — 99215 OFFICE O/P EST HI 40 MIN: CPT | Performed by: STUDENT IN AN ORGANIZED HEALTH CARE EDUCATION/TRAINING PROGRAM

## 2024-02-21 ASSESSMENT — PAIN SCALES - GENERAL: PAINLEVEL: NO PAIN (0)

## 2024-02-21 NOTE — NURSING NOTE
Chief Complaint   Patient presents with    Follow Up     Return Heart Transplant        Vitals were taken, medications reconciled.    Sumi Bush, Facilitator   9:08 AM

## 2024-02-21 NOTE — NURSING NOTE
Transplant Coordinator Note    Reason for visit: 5th annual w/ lab, cxr, clinic. Cardiac MRI on 4/18   Coordinator: Present       Health concerns addressed today:  1. Right side of face swelling.   2. Cold x 1.5 weeks.   3. Cholesterol management referral.   4. If labs look ok, decrease imuran to 75mg daily.       Immunosuppressants:  Rapa goal 5-7 New rapa goal 4-6.   Imuran 100 mg daily     Routine screenings:    Derm: UTD  Dental: Due  Colonoscopy: Last 2022, every 3 years.   Prostate:0.70   Eye: UTD (Nov '23).   Flu/Pneumonia: UTD  Covid: UTD   Shingrix: Due?      Resulted labs and CXR reviewed with patient  Medication record reviewed and reconciled  Questions and concerns addressed  Pt verbalized an understanding of plan of care.     Patient Instructions  1. Make an appt with PMD to follow up with swelling on right side of face.   2. If your BP at home is consistently >140/90 please let me know.   3. Follow up with your dental appointments.   4. Let's make a follow up appt with a nephrology doctor (kidney doctor). I'll message the team.   5. I will put a lipid specialist referral.   6. Once we get your lab results back, we will discuss lowering your Imuran. Please wait to hear from us.   7. Next year you need a repeat colonoscopy.     Next transplant clinic appointment:  4/18 cardiac MRI. Next annual we will do another cardiac MRI stress (no flow).   Next lab draw: TBD, depending on today's results.   Coordinator will call with all pending results.     Please call transplant coordinator with any questions:    Jocelynn Jerez RN BSN   Post Heart Transplant Nurse Coordinator  [facility]  Questions: [phone]

## 2024-02-21 NOTE — PATIENT INSTRUCTIONS
Patient Instructions  1. Make an appt with PMD to follow up with swelling on right side of face.   2. If your BP at home is consistently >140/90 please let me know.   3. Follow up with your dental appointments.   4. Let's make a follow up appt with a nephrology doctor (kidney doctor). I'll message the team.   5. I will put a lipid specialist referral.   6. Once we get your lab results back, we will discuss lowering your Imuran. Please wait to hear from us.   7. Next year you need a repeat colonoscopy.     Next transplant clinic appointment:  4/18 cardiac MRI. Next annual we will do another cardiac MRI stress (no flow).   Next lab draw: TBD, depending on today's results.   Coordinator will call with all pending results.     Please call transplant coordinator with any questions:    Jocelynn Jerez RN BSN   Post Heart Transplant Nurse Coordinator  [facility]  Questions: [phone]

## 2024-02-21 NOTE — PROGRESS NOTES
Advanced Heart Failure/Transplant Clinic    HPI:  Everton Larios is a 58 year old male with a history of ASD (s/p repair in childhood), AFib/AF (s/p ablation), NICM, diastolic dysfunction, alcohol abuse, Pierce's esophagus, ulcerative colitis, GIB (s/p splenic embolization), and hypothyroidism, now s/p OHT and bypass of persistent left SVC to right atrial appendage 2/24/19 who presents to clinic for ongoing evaluation and management. Patient previously followed with Dr. Donis.    His postoperative course was c/b shock due to RV dysfunction requiring IABP support, small pneumoperitoneum (clinically insignificant), chest wall hematoma (former ICD site, s/p evacuation and drain placement 3/31/19), HCAP/klebsiella pneumonia, and FRANKLIN (required short-term HD, now recovered).  His biopsy 3/25/19 showed mild AMR1 with some staining for c4d. Repeat biopsy 3/28 showed improved AMR and less reactive capillary staining, and subsequent biopsies have now shown resolution of AMR and improved capillary staining.  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 Given concerns for MMF colitis, patient was switched to Imuran in March 2022    Today patient reports that he has been feeling well overall. He had COVID one month prior, but this symptoms did not last too long. He also reports having a sinus infection for the last week and this AM woke up with some swelling along his right check and lips. He denies any pain in the area, ear ache, swelling in his throat, fevers, or SOB. He denies any new foods or medications. He is unsure how he was sleeping last night. He denies any chest pain or pressure, orthopnea, PND, palpitations, syncope/presyncope or LE edema. He denies productive cough, abdominal pain, or N/V/D. Patient has chronic sinus issues. Patient also has chronic right shoulder pain, but  deciding if he wants to work this up more. He also denies any problems with his medications and reports compliance.    ROS:  A complete 12-point ROS was negative except as above.    Past Medical History:   Diagnosis Date    Alcohol abuse     Arthritis     hands, neck    ASD (atrial septal defect)     s/p repair age 3    Asthma     Atrial fibrillation (H)     Pierce's esophagus 10/4/2018    Pierce's esophagus     Cataract     CHF (congestive heart failure) (H)     Chronic rhinitis 10/4/2018    Chronic systolic heart failure (H) 10/4/2018    Clotting disorder (H24)     Congenital cardiomyopathy in  (H)     Depression 10/4/2018    Depression     Diastolic dysfunction     DJD (degenerative joint disease)     neck    DJD (degenerative joint disease) - neck 10/4/2018    H/O congenital atrial septal defect (ASD) repair at age 3 10/4/2018    History of anesthesia complications     nausea    History of transfusion     Hypothyroidism     Hypothyroidism due to medication 10/4/2018    ICD (implantable cardioverter-defibrillator) in place     ICD, [employer] ; gen change 2018 10/4/2018    Intermittent asthma without complication 10/4/2018    Nonischemic cardiomyopathy (H) 10/4/2018    On amiodarone therapy 10/4/2018    Pacemaker     Paroxysmal atrial fibrillation (H) 10/4/2018    S/P ablation of atrial fibrillation 10/4/2018    Systolic heart failure (H)     Ulcerative colitis (H)     Ulcerative colitis (H)     Ventricular tachycardia (H) 10/4/2018    Ventricular tachycardia (H)        Past Surgical History:   Procedure Laterality Date    ABLATION OF DYSRHYTHMIC FOCUS      for A fib    AICD, DUAL CHAMBER      ASD REPAIR  1968    BRONCHOSCOPY (RIGID OR FLEXIBLE), DIAGNOSTIC N/A 2019    Procedure: BRONCHOSCOPY, WITH BAL;  Surgeon: Margot Chiang MD;  Location:  GI    CARDIAC DEFIBRILLATOR PLACEMENT      x2--last was 2018    COLONOSCOPY N/A 2020    Procedure: COLONOSCOPY, WITH  POLYPECTOMY AND BIOPSY;  Surgeon: Ranjeet Cooper MD;  Location:  GI    COLONOSCOPY N/A 02/05/2015    Procedure: COLONOSCOPY with biopsy;  Surgeon: Fernie Akers MD;  Location: [facility];  Service:     COLONOSCOPY N/A 12/19/2017    Procedure: COLONOSCOPY with biopsies and polypectomies using snare;  Surgeon: Gael Romero MD;  Location: [facility];  Service:     COLONOSCOPY N/A 3/8/2022    Procedure: COLONOSCOPY, WITH POLYPECTOMY AND BIOPSY;  Surgeon: Paddy Saldivar DO;  Location:  GI    CV CORONARY ANGIOGRAM N/A 02/12/2020    Procedure: CV CORONARY ANGIOGRAM;  Surgeon: Isiah Mcallister MD;  Location:  HEART CARDIAC CATH LAB    CV CORONARY ANGIOGRAM N/A 03/17/2021    Procedure: CV CORONARY ANGIOGRAM;  Surgeon: Santino Mckeon MD;  Location:  HEART CARDIAC CATH LAB    CV CORONARY ANGIOGRAM N/A 2/23/2022    Procedure: CV CORONARY ANGIOGRAM;  Surgeon: Yves Rodrigues MD;  [facility]    CV CORONARY ANGIOGRAM N/A 2/23/2023    Procedure: Coronary Angiogram;  Surgeon: Santino Mckeon MD;  [facility]    CV HEART BIOPSY N/A 03/04/2019    Procedure: Heart Biopsy;  Surgeon: Abhijeet Titus MD;  [facility]    CV HEART BIOPSY N/A 03/25/2019    Procedure: Heart Biopsy;  Surgeon: Yves Rodrigues MD;  [facility]    CV HEART BIOPSY N/A 03/28/2019    Procedure: Heart Cath Heart Biopsy;  Surgeon: Yves Rodrigues MD;  [facility]    CV HEART BIOPSY N/A 04/10/2019    Procedure: CV HEART BIOPSY;  Surgeon: Isiah Mcallister MD;  [facility]    CV HEART BIOPSY N/A 04/24/2019    Procedure: CV HEART BIOPSY;  Surgeon: Isiah Mcallister MD;  [facility]    CV HEART BIOPSY N/A 05/08/2019    Procedure: CV HEART BIOPSY;  Surgeon: Isiah Mcallister MD;  Location:  HEART CARDIAC CATH LAB    CV HEART BIOPSY N/A  06/04/2019    Procedure: CV HEART BIOPSY;  Surgeon: Alton Solomon MD;  [facility]    CV HEART BIOPSY N/A 05/22/2019    Procedure: CV HEART BIOPSY;  Surgeon: Yves Rodrigues MD;  [facility]    CV HEART BIOPSY N/A 07/17/2019    Procedure: CV HEART BIOPSY;  Surgeon: Isiah Mcallister MD;  Location:  HEART CARDIAC CATH LAB    CV HEART BIOPSY N/A 08/13/2019    Procedure: CV HEART BIOPSY;  Surgeon: Jackson Patten MD;  [facility]    CV HEART BIOPSY N/A 10/15/2019    Procedure: CV HEART BIOPSY;  Surgeon: Santino Mckeon MD;  Location:  HEART CARDIAC CATH LAB    CV HEART BIOPSY N/A 02/12/2020    Procedure: CV HEART BIOPSY;  Surgeon: Isiah Mcallister MD;  Location:  HEART CARDIAC CATH LAB    CV HEART BIOPSY N/A 05/05/2020    Procedure: CV HEART BIOPSY;  Surgeon: Yves Rodrigues MD;  [facility]    CV HEART BIOPSY N/A 03/17/2021    Procedure: CV HEART BIOPSY;  Surgeon: Santino Mckeon MD;  Location:  HEART CARDIAC CATH LAB    CV HEART BIOPSY N/A 5/10/2022    Procedure: Heart Biopsy;  Surgeon: Yves Rodrigues MD;  Location:  HEART CARDIAC CATH LAB    CV INTRA AORTIC BALLOON N/A 02/18/2019    Procedure: Intra-Aortic Balloon;  Surgeon: Alton Solomon MD;  Location:  HEART CARDIAC CATH LAB    CV INTRA AORTIC BALLOON N/A 02/20/2019    Procedure: Replace subclavian IABP;  Surgeon: Yves Rodrigues MD;  Location:  HEART CARDIAC CATH LAB    CV INTRAVASULAR ULTRASOUND N/A 02/12/2020    Procedure: CV INTRAVASCULAR ULTRASOUND;  Surgeon: Isiah Mcallister MD;  Location:  HEART CARDIAC CATH LAB    CV LEFT HEART CATH N/A 03/19/2019    Procedure: Left Heart Cath;  Surgeon: Yves Rodrigues MD;  Location:  HEART CARDIAC CATH LAB    CV LEFT HEART CATH N/A 02/12/2020    Procedure: Left Heart Cath;  Surgeon: Isiah Mcallister MD;  Location:  HEART CARDIAC CATH LAB     CV MYOCARDIAL BIOPSY N/A 03/19/2019    Procedure: RHC/HBx - Femoral access for 3/19 per Nimisha GONZALEZ;  Surgeon: Yves Rodrigues MD;  [facility]    CV MYOCARDIAL BIOPSY N/A 03/11/2019    Procedure: ADD ON RHC/HBX;  Surgeon: Alton Solomon MD;  [facility]    CV RIGHT HEART CATH MEASUREMENTS RECORDED N/A 03/25/2019    Procedure: Right Heart Cath;  Surgeon: Yves Rodrigues MD;  [facility]    CV RIGHT HEART CATH MEASUREMENTS RECORDED N/A 03/28/2019    Procedure: Heart Cath Right Heart Cath;  Surgeon: Yves Rodrigues MD;  [facility]    CV RIGHT HEART CATH MEASUREMENTS RECORDED N/A 04/24/2019    Procedure: CV RIGHT HEART CATH;  Surgeon: Isiah Mcallister MD;  [facility]    CV RIGHT HEART CATH MEASUREMENTS RECORDED N/A 06/04/2019    Procedure: CV RIGHT HEART CATH;  Surgeon: Alton Solomon MD;  [facility]    CV RIGHT HEART CATH MEASUREMENTS RECORDED N/A 05/22/2019    Procedure: CV RIGHT HEART CATH;  Surgeon: Yves Rodrigues MD;  [facility]    CV RIGHT HEART CATH MEASUREMENTS RECORDED N/A 08/13/2019    Procedure: CV RIGHT HEART CATH;  Surgeon: Jackson Patten MD;  [facility]    CV RIGHT HEART CATH MEASUREMENTS RECORDED N/A 10/15/2019    Procedure: CV RIGHT HEART CATH;  Surgeon: Santino Mckeon MD;  [facility]    CV RIGHT HEART CATH MEASUREMENTS RECORDED N/A 02/12/2020    Procedure: CV RIGHT HEART CATH;  Surgeon: Isiah Mcallister MD;  [facility]    CV RIGHT HEART CATH MEASUREMENTS RECORDED N/A 03/17/2021    Procedure: CV RIGHT HEART CATH;  Surgeon: Santino Mckeon MD;  [facility]    CV RIGHT HEART CATH MEASUREMENTS RECORDED N/A 2/23/2022    Procedure: CV RIGHT HEART CATH;  Surgeon: Yves Rodrigues MD;   Location:  HEART CARDIAC CATH LAB    CV RIGHT HEART CATH MEASUREMENTS RECORDED N/A 5/10/2022    Procedure: Right Heart Catheterization;  Surgeon: Yves Rodrigues MD;  Location:  HEART CARDIAC CATH LAB    CV RIGHT HEART CATH MEASUREMENTS RECORDED N/A 2/23/2023    Procedure: Right Heart Catheterization;  Surgeon: Santino Mckeon MD;  Location:  HEART CARDIAC CATH LAB    ESOPHAGOSCOPY, GASTROSCOPY, DUODENOSCOPY (EGD), COMBINED N/A 12/19/2017    Procedure: ESOPHAGOGASTRODUODENOSCOPY (EGD) with biopsies;  Surgeon: Gael Romero MD;  Location: [facility];  Service:     ESOPHAGOSCOPY, GASTROSCOPY, DUODENOSCOPY (EGD), COMBINED N/A 3/8/2022    Procedure: ESOPHAGOGASTRODUODENOSCOPY, WITH BIOPSY;  Surgeon: Paddy Saldivar DO;  Location:  GI    H STATISTIC PICC LINE INSERTION >5YR, FAILED Bilateral 10/28/2021    L sided SVC    HAND SURGERY Left     HC REVISE MEDIAN N/CARPAL TUNNEL SURG  09/21/2016    Procedure: OPEN RIGHT CARPAL TUNNEL RELEASE CORTISONE INJECTION RIGHT THUMB;  Surgeon: Duong Santoyo MD;  Location: [facility] OR;  Service: Orthopedics    INCISION AND DRAINAGE CHEST WASHOUT, COMBINED N/A 03/21/2019    Procedure: Incision And Drainage; Evacuation Right Chest Wall Hematoma;  Surgeon: Dewayne House MD;  [facility]    INSERT INTRAAORTIC BALLOON PUMP Left 02/19/2019    Procedure: Insert left  Subclavian Balloon Pump,  Removal Right femoral arterial balloom pump sheath;  Surgeon: Dewayne House MD;  [facility]    INSERT INTRAAORTIC BALLOON PUMP Left 02/21/2019    Procedure: SUBCLAVIAN BALLOON PUMP PLACEMENT;  Surgeon: Ben White MD;  [facility]    INSERT INTRACORONARY STENT      x2    IR PICC PLACEMENT > 5 YRS OF AGE  05/08/2019    TRANSPLANT HEART RECIPIENT N/A 02/24/2019    Procedure: TRANSPLANT HEART RECIPIENT;  Surgeon: Dewayne House MD;  Location: [facility]       Family History   Problem Relation Age of Onset    Skin Cancer Mother      Endometrial Cancer Mother     Osteoporosis Mother     Hypertension Father     Endometrial Cancer Maternal Grandmother     Hip fracture No family hx of     Nephrolithiasis No family hx of        Social History     Tobacco Use    Smoking status: Former     Packs/day: 1.00     Years: 25.00     Additional pack years: 0.00     Total pack years: 25.00     Types: Cigarettes     Start date: 1/1/1980     Quit date: 11/6/2008     Years since quitting: 15.3     Passive exposure: Past    Smokeless tobacco: Never   Substance Use Topics    Alcohol use: Yes     Alcohol/week: 1.0 - 2.0 standard drink of alcohol     Types: 1 - 2 Cans of beer per week       MEDICATIONS:  Current Outpatient Medications   Medication Sig Dispense Refill    acetaminophen (TYLENOL) 325 MG tablet Take 2 tablets (650 mg) by mouth every 4 hours as needed for mild pain      albuterol (PROAIR HFA/PROVENTIL HFA/VENTOLIN HFA) 108 (90 Base) MCG/ACT inhaler Inhale 2 puffs into the lungs every 6 hours as needed for shortness of breath or wheezing 18 g 5    amLODIPine (NORVASC) 5 MG tablet Take 2 tablets (10 mg) by mouth daily 180 tablet 11    aspirin (ASA) 81 MG chewable tablet Take 1 tablet (81 mg) by mouth daily      azaTHIOprine 100 MG TABS Take 100 mg by mouth daily 90 tablet 3    calcium carbonate 600 mg-vitamin D 400 units (CALTRATE) 600-400 MG-UNIT per tablet Take 1 tablet by mouth 2 times daily (with meals)      cetirizine (ZYRTEC) 10 MG tablet Take 10 mg by mouth daily      clindamycin (CLINDAMAX) 1 % external gel Apply twice daily as needed for acne on the spots 60 g 11    DULoxetine (CYMBALTA) 20 MG capsule Take 1 capsule (20 mg) by mouth daily 90 capsule 3    fluticasone (FLOVENT HFA) 220 MCG/ACT inhaler Inhale 2 puffs into the lungs 2 times daily 12 g 3    levothyroxine (SYNTHROID/LEVOTHROID) 75 MCG tablet Take 1 tablet (75 mcg) by mouth daily 90 tablet 3    losartan (COZAAR) 25 MG tablet Take 3 tablets (75 mg) by mouth daily 270 tablet 3    melatonin  3 MG tablet Take 2 tablets (6 mg) by mouth At Bedtime (Patient taking differently: Take 3 mg by mouth nightly as needed)      multivitamin w/minerals (THERA-VIT-M) tablet Take 1 tablet by mouth daily      omeprazole (PRILOSEC) 40 MG DR capsule TAKE 1 CAPSULE BY MOUTH EVERY DAY 30 capsule 11    RESTASIS 0.05 % ophthalmic emulsion Place 1 drop into both eyes 2 times daily      rosuvastatin (CRESTOR) 40 MG tablet Take 1 tablet (40 mg) by mouth daily 90 tablet 3    sildenafil (VIAGRA) 50 MG tablet Take 1 tablet (50 mg) by mouth daily as needed (Erectile dysfunction) 10 tablet 11    sirolimus (GENERIC EQUIVALENT) 1 MG tablet Take 3 tablets (3 mg) by mouth daily 270 tablet 11    tadalafil (CIALIS) 10 MG tablet Take 1 tablet (10 mg) by mouth daily as needed (Erectile dysfunction) 10 tablet 3       EXAM:   /87 (BP Location: Right arm, Patient Position: Chair, Cuff Size: Adult Regular)   Pulse 102   Wt 87.2 kg (192 lb 4.8 oz)   SpO2 99%   BMI 29.89 kg/m    General: appears comfortable, alert and interactive, in no acute distress  Head: normocephalic, atraumatic, mild swelling along right cheek and bottom lip  Eyes: anicteric sclera, EOMI  Mouth: teeth intact, no tenderness or sores in the mouth  Neck: supple, no cervical adenopathy  CV: regular rate and rhythm, S1/S2, no murmur, gallop, rub, estimated JVP ~7 cm  Resp: clear, no rales or wheezing  GI: soft, nontender, nondistended, +BS  Extremities: warm, no peripheral edema, 2+ bilateral radial pulses  Neurological: normal speech and affect, no gross motor deficits  Psych: normal mood and affect  Derm: no rashes or lesions on exposed surfaces    I personally reviewed recent labs and data as below and discussed the results with the patient in clinic today.  Labs:  CBC RESULTS:  Lab Results   Component Value Date    WBC 4.5 02/21/2024    WBC 5.0 04/09/2021    RBC 4.64 02/21/2024    RBC 4.61 04/09/2021    HGB 12.4 (L) 02/21/2024    HGB 11.2 (A) 04/09/2021    HCT 38.3  (L) 02/21/2024    HCT 34.6 04/09/2021    MCV 83 02/21/2024    MCV 75 04/09/2021    MCH 26.7 02/21/2024    MCH 24.3 04/09/2021    MCHC 32.4 02/21/2024    MCHC 32.4 04/09/2021    RDW 14.5 02/21/2024    RDW 14.9 04/09/2021     02/21/2024     04/09/2021       CMP RESULTS:  Lab Results   Component Value Date     02/21/2024     03/17/2021    POTASSIUM 4.8 02/21/2024    POTASSIUM 4.4 05/28/2022    POTASSIUM 3.9 03/17/2021    CHLORIDE 103 02/21/2024    CHLORIDE 107 05/28/2022    CHLORIDE 106 03/17/2021    CO2 23 02/21/2024    CO2 24 05/28/2022    CO2 24 03/17/2021    ANIONGAP 12 02/21/2024    ANIONGAP 6 05/28/2022    ANIONGAP 8 03/17/2021    GLC 95 02/21/2024    GLC 97 05/28/2022    GLC 85 03/17/2021    BUN 30.6 (H) 02/21/2024    BUN 31 (H) 05/28/2022    BUN 32 (H) 03/17/2021    CR 2.33 (H) 02/21/2024    CR 1.80 (H) 03/17/2021    GFRESTIMATED 31 (L) 02/21/2024    GFRESTIMATED 34 (L) 06/22/2021    GFRESTIMATED 41 (L) 03/17/2021    GFRESTBLACK 41 (L) 06/22/2021    GFRESTBLACK 47 (L) 03/17/2021    RADAMES 9.7 02/21/2024    RADAMES 9.0 03/17/2021    BILITOTAL 0.3 02/21/2024    BILITOTAL 0.5 03/17/2021    ALBUMIN 4.4 02/21/2024    ALBUMIN 3.2 (L) 02/23/2022    ALBUMIN 3.6 03/17/2021    ALKPHOS 116 02/21/2024    ALKPHOS 131 03/17/2021    ALT 24 02/21/2024    ALT 49 03/17/2021    AST 24 02/21/2024    AST 48 (H) 03/17/2021        INR RESULTS:  Lab Results   Component Value Date    INR 1.17 (H) 09/23/2019       Lab Results   Component Value Date    MAG 2.1 02/21/2024    MAG 1.6 03/17/2021     Lab Results   Component Value Date    NTBNPI 767 09/23/2019     Lab Results   Component Value Date    NTBNP 10,986 (H) 11/15/2018     TTE 2/23/23  Interpretation Summary  Left ventricular size, wall motion and function are normal. The ejection  fraction is 55-60%.  On direct comparison, the global right ventricular function appears mildly  reduced compared to 2022, but TAPSE and S' values are similar.  No significant valve  abnormalities.  The inferior vena cava is normal.  No pericardial effusion.    Coronary Angiogram/RHC 2/23/23  Coronary Findings    Diagnostic  Dominance: Right  Left Main   The vessel is large. There was 0% vessel disease.      Left Anterior Descending   The vessel is large. There was 0% vessel disease.      Left Circumflex   The vessel is moderate in size. There was 0% vessel disease.      First Obtuse Marginal Branch   The vessel is large.      Right Coronary Artery   The vessel is large. There was 0% vessel disease.         Intervention     No interventions have been documented.     Hemodynamics    Right Heart Catheterization:  /89/114 mmHg  HR 92 BPM    RA 7/11/7 mmHg  RV 40/7 mmHg  PA 40/19/28 mmHg  PCW 17/24/17 mmHg  Jaden CO 7.13 L/min Normal = 4.0-8.0 L/min  Jaden CI 3.64 L/min/m2 Normal = 2.5-4.0 L/min/m2  TD CO 6.93 L/min Normal = 4.0-8.0 L/min  TD CI 3.54 L/min/m2 Normal = 2.5-4.0 L/min/m2  PA sat 72.4%   Hgb 10.7 g/dL   PVR 1.71 Woods units  SVR 1021 dynes-sec/cm5     ECG 2/23/23 shows sinus rhythm with nonspecific intraventricular conduction block, no acute ST-T changes    RHC 2/23/22  RA: 10  RV: 31 (11)  PA: 30/14 (21)  PVR: 0.91  Jaden CO: 6.3    CI: 3.3  TDCO: 7.4       CI: 3.8     Angiogram 2/23/22  Left Main   The vessel is large. There was 0% vessel disease.   Left Anterior Descending   The vessel is large. There was 0% vessel disease.   Left Circumflex   The vessel is moderate in size. There was 0% vessel disease.   Right Coronary Artery   The vessel is large. There was 0% vessel disease.     CMR 2/23/22  Clinical history: 56-year-old man post orthotopic heart transplantation in 2019  Comparison CMR: March 2021  1. The left ventricle is normal in cavity size and wall thickness. There are no regional wall motion  abnormalities. The global systolic function is normal. The LVEF is 55%.  2. The right ventricle is normal in cavity size. The global systolic function is normal. The RVEF is 54%.    3. The left atrium is enlarged due to transplantation and the right atrium is normal in size.  4. There is no significant valvular disease.   5. There is patchy midmyocardial late gadolinium enhancement at the basal-mid RV insertion sites consistent with non ischemic fibrosis.    6. There is no ischemia on stress perfusion imaging.  7. There is no pericardial effusion.  8. There is no intracardiac thrombus.  CONCLUSIONS:   Post heart transplantation.  Normal biventricular size and function, LVEF 55 % and RVEF 54%.   No evidence of myocardial ischemia.  No significant changes when compared to prior study from 2021    Echo 8/31/21  Interpretation Summary  Global and regional left ventricular function is normal with an EF of 60-65%.  Global right ventricular function is normal.  Pulmonary artery systolic pressure is normal.  The inferior vena cava was normal in size with preserved respiratory variability.  No pericardial effusion is present.    RHC and angiography 2/12/2020  Pressures Phase   Time  Systolic  Diastolic  Mean  A Wave  V Wave  EDP  Max dp/dt  HR    RA Pressures  10:37 AM    7 mmHg     9 mmHg     7 mmHg       95 bpm       RV Pressures  10:37 AM  30 mmHg         9 mmHg      95 bpm       PA Pressures  10:38 AM  30 mmHg     16 mmHg     20 mmHg         94 bpm       PCW Pressures  10:38 AM    12 mmHg     13 mmHg     13 mmHg       95 bpm       AO Pressures  11:00 AM  94 mmHg     72 mmHg     83 mmHg         98 bpm       Art Pressures  10:59 AM  120 mmHg     74 mmHg     91 mmHg         103 bpm       LV Pressures  10:59 AM  120 mmHg         17 mmHg      102 bpm          Time  Hb  SAT(%)  PO2  Content  PA Sat    PA  10:21 AM   71.5 %       71.5 %       Art  10:21 AM   100 %      14.28 mL/dL        Cardiac Output    Time  TDCO  TDCI  Jaden C.O.  Jaden C.I.  Jaden HR    Cardiac Output Results  10:21 AM  6.77 L/min     3.74 L/min/m2     5.68 L/min     3.13 L/min/m2            Left Main    The vessel was visualized by  selective angiography and is large. There was 0% vessel disease.    Left Anterior Descending    The vessel was visualized by selective angiography and is large. There was 0% vessel disease. IVUS was performed on the LMCA and LAD vessels. The LMCA was engaged with a 6 Fr Ikari LF 3.5 GC and the patient was anticoagulated with IV UFH. A BMW was passed into the distal LAD without difficulty. The [place] IVUS was passed over the wire and manually pulled back while recording. Proximal LAD HERACLIO 0.3 Lumen 24.1 Vessel 29.8 19% area stenosis Mid LAD HERACLIO 0.3 Lumen 10.8 Vessel 13.7 21% area stenosis IVUS was performed on the vessel. Ultrasound supply: CATH [employer] IVUS SHRT.    Left Circumflex    The vessel was visualized by selective angiography and is moderate in size. There was 0% vessel disease.    Right Coronary Artery    The vessel was visualized by selective angiography and is large. There was 0% vessel disease.    Conclusion  Mild intimal hyperplasia with up to 0.3 mm HERACLIO and 20% narrowing by area.    ECHO 12/18/2019  Interpretation Summary  Left ventricular function, chamber size, wall motion, and wall thickness are  normal.The EF is 60-65%.  Right ventricular function, chamber size, wall motion, and thickness are  normal.  No significant valvular abnormalities were noted.  Pulmonary artery systolic pressure is normal.  The inferior vena cava was normal in size with preserved respiratory  variability.  No pericardial effusion is present.      RHC and EMB 10/15/19:  Conclusion     Right sided filling pressures are normal.  Left sided filling pressures are normal.  Normal PA pressures.  Normal cardiac output level.  Successful collection of endomyocardial biopsies          Plan      Follow bedrest per protocol   Continued medical management and lifestyle modifications for cardiovascular risk factor optimizations.   Discharge today per protocol   Hemodynamics     Right Heart Pressures     Right sided filling  pressures are normal.Left sided filling pressures are normal. Normal PA pressures.Normal cardiac output level.   Heart Biospy     Heart Biopsy     After informed consent patient was prepped and draped in the usual fashion. Under fluoroscopy guidance a 7 Fr angeled sheath was placed into the right internal jugular vein after local anesthesia with 1.0% lidocaine. 5.5 Arabic Jawz bioptome was passed through the sheath to the right ventricular septum and 5 biopsies were obtained. The biopsy specimens were sent to Pathology for examination. The sheath was removed and hemostasis was obtained. The patient tolerated the procedure well and there were no complications.   Pressures Phase: Baseline      Time Systolic Diastolic Mean A Wave V Wave EDP Max dp/dt HR   RA Pressures 11:49 AM   2 mmHg    3 mmHg    4 mmHg      105 bpm      RV Pressures 11:35 AM       1776 mmHg/sec        11:49 AM 22 mmHg        4 mmHg     105 bpm      PA Pressures 11:50 AM 20 mmHg    10 mmHg    14 mmHg        105 bpm      PCW Pressures 11:49 AM   6 mmHg    7 mmHg    7 mmHg      105 bpm      Blood Flow Results Phase: Baseline      Time Results Indexed Values   QP 11:35 AM 5.92 L/min    3.66 L/min/m2      QS 11:35 AM 5.92 L/min    3.66 L/min/m2      Blood Oximetry Phase: Baseline      Time Hb SAT(%) PO2 Content PA Sat   PA 11:35 AM  66.6 %      66.6 %      Art 11:35 AM  100 %     10.74 mL/dL       Cardiac Output Phase: Baseline      Time TDCO TDCI Jaden C.O. Jaden C.I. Jaden HR   Cardiac Output Results 11:35 AM 6.03 L/min    3.74 L/min/m2    5.92 L/min    3.66 L/min/m2        11:51 AM 6.03 L/min          Resistance Results Phase: Baseline      Time PVR SVR PVR-I SVR-I TPR TVR TPR-I TVR-I PVR/SVR TPR/TVR   Resistance Results (Metric) 11:35 .64 dsc-5     172.25 dsc-5/m2     186.62 dsc-5     301.45 dsc-5/m2         Resistance Results (Wood) 11:35 AM 1.33 MARTIN     2.15 MARTIN/m2     2.33 MARTIN     3.77 MARTIN/m2         Stoke Volume Results Phase: Baseline      Time  RVSW LVSW RVSW-I LVSW-I   Stroke Work Results 11:35 AM 9.33 gm*m     5.77 gm*m/m2         TTE 10/15/19:  Interpretation Summary  Left ventricular function, chamber size, wall motion, and wall thickness are  normal.The EF is 60-65%. No regional wall motion abnormalities are seen.  Right ventricular function, chamber size, wall motion, and thickness are  normal.  Trace tricuspid insufficiency is present. Pulmonary artery systolic pressure  is normal. The inferior vena cava was normal in size with preserved  respiratory variability. No pericardial effusion is present.  There has been no change.    Assessment and Plan:    58 year old male with a history of ASD (s/p repair in childhood), AFib/AF (s/p ablation), NICM, diastolic dysfunction, alcohol abuse, Pierce's esophagus, ulcerative colitis, GIB (s/p splenic embolization), and hypothyroidism, now s/p OHT and bypass of persistent left SVC to right atrial appendage 2/24/19 who presents for ongoing evaluation and management.    NICM s/p OHT and bypass of persistent left SVC to right atrial appendage 2/24/19  His postoperative course was c/b shock due to RV dysfunction requiring IABP support, small pneumoperitoneum (clinically insignificant), chest wall hematoma (former ICD site, s/p evacuation and drain placement 3/31/19), HCAP/klebsiella pneumonia, and FRANKLIN (required short-term HD, now recovered). His biopsy 3/25/19 showed mild AMR1 with some staining for c4d.  Repeat biopsy 3/28/19 showed improved AMR and less reactive capillary staining, and subsequent biopsies have now shown resolution of AMR and improved capillary staining.     Today patient is euvolemic by history and exam. Overall doing well. No CAV and grossly normal filling pressures with normal cardiac index/output on last surveillance testing. cMRI planned for April '24.     Serostatus:  - CMV D+/R+  - EBV D+/R+  - Toxo D-/R-     Immunosuppression:  - Sirolimus 4 mg daily, goal decrease to 4-6, level pending  from today  - Azathioprine 100 mg daily, will consider dropping dose given frequent sinus infections pending rest of lab results from today     Graft function:  - BPs: Controlled at home, but slightly elevated here, but took meds late today, continue losartan 75 mg daily. Instructed patient to call clinic if BP consistently > 140/90  - HRs:  Stable  - fluid status:  Euvolemic, off diuretics      PPx:  - CAV: Aspirin 81mg daily and rosuvastatin 40 mg daily (Continue to monitor HLD, if not improving with diet and exercise, will refer to lipid clinic)  - GERD:  Omeprazole (note h/o carrera's esophagus)  - Osteoporosis:  Calcium/vitamin D supplements     Routine screenings  Derm: UTD  Dental: DUE  Colonoscopy: completed 3/8/2022, every 3 years  EGD: 3/8/2022, every 3 years  Prostate:  stable  Eye: UTD  Flu/Pneumonia: UTD  Covid: UTD     Ulcerative Colitis  - Followed by GI    Chronic Kidney Disease Stage III  - Follows with Renal, needs to re-establish    Chronic Sinus Infections  - Follows with ENT  - Discussed that if any surgeries were to be planned in the future, would need to switch sirolimus back to tac for wound healing    Chronic Right Shoulder Pain  - Follows with Ortho    Facial Edema  Started this AM and is mild and one-sided, getting over a recent sinus infection.  - will call PCP for further evaluation    To Do:   - Follow up pending labs from today  - Decrease sirolimus goal to 4-6  - cMRI in April '24  - Needs to see lipid clinic and Nephrology again  - Due for dental  - Follow up with PCP for facial swelling that is mild and started this AM  - SOT labs per protocol  - Follow up in one year, happy to see sooner if any new or acute issues arise    A total of 44 minutes was spent on the day of the visit, which includes preparation for the visit (reviewing previous medical records, laboratories and investigations), in conjunction with the actual clinic visit with the patient, which includes obtaining a  history and physical exam, creating and reviewing the care plan, patient education (and family if present), counseling, documenting clinical information in the electronic health record and care coordination.     The longitudinal plan of care for the condition(s) below were addressed during this visit. Due to the added complexity in care, I will continue to support [facility] in the subsequent management of this condition(s) and with the ongoing continuity of care of this condition(s).    Problem List Items Addressed This Visit as of 2/21/2024         Other    Transplanted heart (H)        Immunosuppression        Chronic kidney disease stage III    Slime Lindsay MD   of Medicine, [facility]  Advanced Heart Failure and Transplant Cardiology     CC  Fredrick Wolff

## 2024-02-21 NOTE — LETTER
2/21/2024      RE: Everton Larios  [address] [facility] [id number]       Dear Colleague,    Thank you for the opportunity to participate in the care of your patient, Everton Larios, at the [facility] [place] at [facility]. Please see a copy of my visit note below.    Advanced Heart Failure/Transplant Clinic    HPI:  Everton Larios is a 58 year old male with a history of ASD (s/p repair in childhood), AFib/AF (s/p ablation), NICM, diastolic dysfunction, alcohol abuse, Pierce's esophagus, ulcerative colitis, GIB (s/p splenic embolization), and hypothyroidism, now s/p OHT and bypass of persistent left SVC to right atrial appendage 2/24/19 who presents to clinic for ongoing evaluation and management. Patient previously followed with Dr. Donis.    His postoperative course was c/b shock due to RV dysfunction requiring IABP support, small pneumoperitoneum (clinically insignificant), chest wall hematoma (former ICD site, s/p evacuation and drain placement 3/31/19), HCAP/klebsiella pneumonia, and FRANKLIN (required short-term HD, now recovered).  His biopsy 3/25/19 showed mild AMR1 with some staining for c4d. Repeat biopsy 3/28 showed improved AMR and less reactive capillary staining, and subsequent biopsies have now shown resolution of AMR and improved capillary staining.  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 Given concerns for MMF colitis, patient was switched to Imuran in March 2022    Today patient reports that he has been feeling well overall. He had COVID one month prior, but this symptoms did not last too long. He also reports having a sinus infection for the last week and this AM woke up with some swelling along his right check and lips. He denies any pain in the area, ear ache, swelling in his  throat, fevers, or SOB. He denies any new foods or medications. He is unsure how he was sleeping last night. He denies any chest pain or pressure, orthopnea, PND, palpitations, syncope/presyncope or LE edema. He denies productive cough, abdominal pain, or N/V/D. Patient has chronic sinus issues. Patient also has chronic right shoulder pain, but deciding if he wants to work this up more. He also denies any problems with his medications and reports compliance.    ROS:  A complete 12-point ROS was negative except as above.    Past Medical History:   Diagnosis Date    Alcohol abuse     Arthritis     hands, neck    ASD (atrial septal defect)     s/p repair age 3    Asthma     Atrial fibrillation (H)     Pierce's esophagus 10/4/2018    Pierce's esophagus     Cataract     CHF (congestive heart failure) (H)     Chronic rhinitis 10/4/2018    Chronic systolic heart failure (H) 10/4/2018    Clotting disorder (H24)     Congenital cardiomyopathy in  (H)     Depression 10/4/2018    Depression     Diastolic dysfunction     DJD (degenerative joint disease)     neck    DJD (degenerative joint disease) - neck 10/4/2018    H/O congenital atrial septal defect (ASD) repair at age 3 10/4/2018    History of anesthesia complications     nausea    History of transfusion     Hypothyroidism     Hypothyroidism due to medication 10/4/2018    ICD (implantable cardioverter-defibrillator) in place     ICD, [employer] ; gen change 2018 10/4/2018    Intermittent asthma without complication 10/4/2018    Nonischemic cardiomyopathy (H) 10/4/2018    On amiodarone therapy 10/4/2018    Pacemaker     Paroxysmal atrial fibrillation (H) 10/4/2018    S/P ablation of atrial fibrillation 10/4/2018    Systolic heart failure (H)     Ulcerative colitis (H)     Ulcerative colitis (H)     Ventricular tachycardia (H) 10/4/2018    Ventricular tachycardia (H)        Past Surgical History:   Procedure Laterality Date    ABLATION OF DYSRHYTHMIC  FOCUS      for A fib    AICD, DUAL CHAMBER      ASD REPAIR  01/01/1968    BRONCHOSCOPY (RIGID OR FLEXIBLE), DIAGNOSTIC N/A 04/30/2019    Procedure: BRONCHOSCOPY, WITH BAL;  Surgeon: Margot Chiang MD;  Location:  GI    CARDIAC DEFIBRILLATOR PLACEMENT      x2--last was Feb 2018    COLONOSCOPY N/A 02/20/2020    Procedure: COLONOSCOPY, WITH POLYPECTOMY AND BIOPSY;  Surgeon: Ranjeet Cooper MD;  Location:  GI    COLONOSCOPY N/A 02/05/2015    Procedure: COLONOSCOPY with biopsy;  Surgeon: Fernie Akers MD;  Location: [facility];  Service:     COLONOSCOPY N/A 12/19/2017    Procedure: COLONOSCOPY with biopsies and polypectomies using snare;  Surgeon: Gael Romero MD;  Location: [facility];  Service:     COLONOSCOPY N/A 3/8/2022    Procedure: COLONOSCOPY, WITH POLYPECTOMY AND BIOPSY;  Surgeon: Paddy Saldivar DO;  Location:  GI    CV CORONARY ANGIOGRAM N/A 02/12/2020    Procedure: CV CORONARY ANGIOGRAM;  Surgeon: Iisah Mcallister MD;  Location:  HEART CARDIAC CATH LAB    CV CORONARY ANGIOGRAM N/A 03/17/2021    Procedure: CV CORONARY ANGIOGRAM;  Surgeon: Santino Mckeon MD;  Location:  HEART CARDIAC CATH LAB    CV CORONARY ANGIOGRAM N/A 2/23/2022    Procedure: CV CORONARY ANGIOGRAM;  Surgeon: Yves Rodrigues MD;  [facility]    CV CORONARY ANGIOGRAM N/A 2/23/2023    Procedure: Coronary Angiogram;  Surgeon: Santino Mckeon MD;  Location:  HEART CARDIAC CATH LAB    CV HEART BIOPSY N/A 03/04/2019    Procedure: Heart Biopsy;  Surgeon: Abhijeet Titus MD;  [facility]    CV HEART BIOPSY N/A 03/25/2019    Procedure: Heart Biopsy;  Surgeon: Yves Rodrigues MD;  [facility]    CV HEART BIOPSY N/A 03/28/2019    Procedure: Heart Cath Heart Biopsy;  Surgeon: Yves Rodrigues MD;  [facility]    CV HEART BIOPSY N/A 04/10/2019    Procedure: CV HEART BIOPSY;  Surgeon: Isiah Mcallister  MD Rodrick;  Location:  HEART CARDIAC CATH LAB    CV HEART BIOPSY N/A 04/24/2019    Procedure: CV HEART BIOPSY;  Surgeon: Isiah Mcallister MD;  [facility]    CV HEART BIOPSY N/A 05/08/2019    Procedure: CV HEART BIOPSY;  Surgeon: Isiah Mcallister MD;  [facility]    CV HEART BIOPSY N/A 06/04/2019    Procedure: CV HEART BIOPSY;  Surgeon: Alton Solomon MD;  [facility]    CV HEART BIOPSY N/A 05/22/2019    Procedure: CV HEART BIOPSY;  Surgeon: Yves Rodrigues MD;  Location:  HEART CARDIAC CATH LAB    CV HEART BIOPSY N/A 07/17/2019    Procedure: CV HEART BIOPSY;  Surgeon: Isiah Mcallister MD;  Location:  HEART CARDIAC CATH LAB    CV HEART BIOPSY N/A 08/13/2019    Procedure: CV HEART BIOPSY;  Surgeon: Jackson Patten MD;  Location:  HEART CARDIAC CATH LAB    CV HEART BIOPSY N/A 10/15/2019    Procedure: CV HEART BIOPSY;  Surgeon: Santino Mckeon MD;  Location:  HEART CARDIAC CATH LAB    CV HEART BIOPSY N/A 02/12/2020    Procedure: CV HEART BIOPSY;  Surgeon: Isiah Mcallister MD;  Location:  HEART CARDIAC CATH LAB    CV HEART BIOPSY N/A 05/05/2020    Procedure: CV HEART BIOPSY;  Surgeon: Yves Rodrigues MD;  Location:  HEART CARDIAC CATH LAB    CV HEART BIOPSY N/A 03/17/2021    Procedure: CV HEART BIOPSY;  Surgeon: Santino Mckeon MD;  Location:  HEART CARDIAC CATH LAB    CV HEART BIOPSY N/A 5/10/2022    Procedure: Heart Biopsy;  Surgeon: Yves Rodrigues MD;  Location:  HEART CARDIAC CATH LAB    CV INTRA AORTIC BALLOON N/A 02/18/2019    Procedure: Intra-Aortic Balloon;  Surgeon: Alton Solomon MD;  Location:  HEART CARDIAC CATH LAB    CV INTRA AORTIC BALLOON N/A 02/20/2019    Procedure: Replace subclavian IABP;  Surgeon: Yves Rodrigues MD;  [facility]    CV INTRAVASULAR ULTRASOUND N/A 02/12/2020    Procedure: CV INTRAVASCULAR ULTRASOUND;  Surgeon:  Isiah Mcallister MD;  [facility]    CV LEFT HEART CATH N/A 03/19/2019    Procedure: Left Heart Cath;  Surgeon: Yves Rodrigues MD;  [facility]    CV LEFT HEART CATH N/A 02/12/2020    Procedure: Left Heart Cath;  Surgeon: Isiah Mcallister MD;  [facility]    CV MYOCARDIAL BIOPSY N/A 03/19/2019    Procedure: RHC/HBx - Femoral access for 3/19 per Nimisha GONZALEZ;  Surgeon: Yves Rodrigues MD;  [facility]    CV MYOCARDIAL BIOPSY N/A 03/11/2019    Procedure: ADD ON RHC/HBX;  Surgeon: Alton Solomon MD;  [facility]    CV RIGHT HEART CATH MEASUREMENTS RECORDED N/A 03/25/2019    Procedure: Right Heart Cath;  Surgeon: Yves Rodrigues MD;  [facility]ARD[facility]    CV RIGHT HEART CATH MEASUREMENTS RECORDED N/A 03/28/2019    Procedure: Heart Cath Right Heart Cath;  Surgeon: Yves Rodrigues MD;  [facility]    CV RIGHT HEART CATH MEASUREMENTS RECORDED N/A 04/24/2019    Procedure: CV RIGHT HEART CATH;  Surgeon: Isiah Mcallister MD;  [facility]    CV RIGHT HEART CATH MEASUREMENTS RECORDED N/A 06/04/2019    Procedure: CV RIGHT HEART CATH;  Surgeon: Alton Solomon MD;  [facility]    CV RIGHT HEART CATH MEASUREMENTS RECORDED N/A 05/22/2019    Procedure: CV RIGHT HEART CATH;  Surgeon: Yves Rodrigues MD;  [facility]    CV RIGHT HEART CATH MEASUREMENTS RECORDED N/A 08/13/2019    Procedure: CV RIGHT HEART CATH;  Surgeon: Jackson Patten MD;  [facility]    CV RIGHT HEART CATH MEASUREMENTS RECORDED N/A 10/15/2019    Procedure: CV RIGHT HEART CATH;  Surgeon: Santino Mckeon MD;  [facility]    CV RIGHT HEART CATH MEASUREMENTS RECORDED N/A 02/12/2020    Procedure: CV RIGHT HEART CATH;  Surgeon: Isiah Mcallister  MD Rodrick;  Location:  HEART CARDIAC CATH LAB    CV RIGHT HEART CATH MEASUREMENTS RECORDED N/A 03/17/2021    Procedure: CV RIGHT HEART CATH;  Surgeon: Santino Mckeon MD;  Location:  HEART CARDIAC CATH LAB    CV RIGHT HEART CATH MEASUREMENTS RECORDED N/A 2/23/2022    Procedure: CV RIGHT HEART CATH;  Surgeon: Yves Rodrigues MD;  Location:  HEART CARDIAC CATH LAB    CV RIGHT HEART CATH MEASUREMENTS RECORDED N/A 5/10/2022    Procedure: Right Heart Catheterization;  Surgeon: Yves Rodrigues MD;  Location:  HEART CARDIAC CATH LAB    CV RIGHT HEART CATH MEASUREMENTS RECORDED N/A 2/23/2023    Procedure: Right Heart Catheterization;  Surgeon: Santino Mckeon MD;  Location:  HEART CARDIAC CATH LAB    ESOPHAGOSCOPY, GASTROSCOPY, DUODENOSCOPY (EGD), COMBINED N/A 12/19/2017    Procedure: ESOPHAGOGASTRODUODENOSCOPY (EGD) with biopsies;  Surgeon: Gael Romero MD;  Location: [facility];  Service:     ESOPHAGOSCOPY, GASTROSCOPY, DUODENOSCOPY (EGD), COMBINED N/A 3/8/2022    Procedure: ESOPHAGOGASTRODUODENOSCOPY, WITH BIOPSY;  Surgeon: Paddy Saldivar DO;  Location: [facility]    H STATISTIC PICC LINE INSERTION >5YR, FAILED Bilateral 10/28/2021    L sided SVC    HAND SURGERY Left     HC REVISE MEDIAN N/CARPAL TUNNEL SURG  09/21/2016    Procedure: OPEN RIGHT CARPAL TUNNEL RELEASE CORTISONE INJECTION RIGHT THUMB;  Surgeon: Duong Santoyo MD;  Location: [facility] OR;  Service: Orthopedics    INCISION AND DRAINAGE CHEST WASHOUT, COMBINED N/A 03/21/2019    Procedure: Incision And Drainage; Evacuation Right Chest Wall Hematoma;  Surgeon: Dewayne House MD;  Location:  OR    INSERT INTRAAORTIC BALLOON PUMP Left 02/19/2019    Procedure: Insert left  Subclavian Balloon Pump,  Removal Right femoral arterial balloom pump sheath;  Surgeon: Dewayne House MD;  Location:  OR    INSERT INTRAAORTIC BALLOON PUMP Left 02/21/2019    Procedure: SUBCLAVIAN BALLOON PUMP PLACEMENT;  Surgeon: Christopher  MD Ben;  [facility]    INSERT INTRACORONARY STENT      x2    IR PICC PLACEMENT > 5 YRS OF AGE  05/08/2019    TRANSPLANT HEART RECIPIENT N/A 02/24/2019    Procedure: TRANSPLANT HEART RECIPIENT;  Surgeon: Dewayne House MD;  [facility]       Family History   Problem Relation Age of Onset    Skin Cancer Mother     Endometrial Cancer Mother     Osteoporosis Mother     Hypertension Father     Endometrial Cancer Maternal Grandmother     Hip fracture No family hx of     Nephrolithiasis No family hx of        Social History     Tobacco Use    Smoking status: Former     Packs/day: 1.00     Years: 25.00     Additional pack years: 0.00     Total pack years: 25.00     Types: Cigarettes     Start date: 1/1/1980     Quit date: 11/6/2008     Years since quitting: 15.3     Passive exposure: Past    Smokeless tobacco: Never   Substance Use Topics    Alcohol use: Yes     Alcohol/week: 1.0 - 2.0 standard drink of alcohol     Types: 1 - 2 Cans of beer per week       MEDICATIONS:  Current Outpatient Medications   Medication Sig Dispense Refill    acetaminophen (TYLENOL) 325 MG tablet Take 2 tablets (650 mg) by mouth every 4 hours as needed for mild pain      albuterol (PROAIR HFA/PROVENTIL HFA/VENTOLIN HFA) 108 (90 Base) MCG/ACT inhaler Inhale 2 puffs into the lungs every 6 hours as needed for shortness of breath or wheezing 18 g 5    amLODIPine (NORVASC) 5 MG tablet Take 2 tablets (10 mg) by mouth daily 180 tablet 11    aspirin (ASA) 81 MG chewable tablet Take 1 tablet (81 mg) by mouth daily      azaTHIOprine 100 MG TABS Take 100 mg by mouth daily 90 tablet 3    calcium carbonate 600 mg-vitamin D 400 units (CALTRATE) 600-400 MG-UNIT per tablet Take 1 tablet by mouth 2 times daily (with meals)      cetirizine (ZYRTEC) 10 MG tablet Take 10 mg by mouth daily      clindamycin (CLINDAMAX) 1 % external gel Apply twice daily as needed for acne on the spots 60 g 11    DULoxetine (CYMBALTA) 20 MG capsule Take 1 capsule  (20 mg) by mouth daily 90 capsule 3    fluticasone (FLOVENT HFA) 220 MCG/ACT inhaler Inhale 2 puffs into the lungs 2 times daily 12 g 3    levothyroxine (SYNTHROID/LEVOTHROID) 75 MCG tablet Take 1 tablet (75 mcg) by mouth daily 90 tablet 3    losartan (COZAAR) 25 MG tablet Take 3 tablets (75 mg) by mouth daily 270 tablet 3    melatonin 3 MG tablet Take 2 tablets (6 mg) by mouth At Bedtime (Patient taking differently: Take 3 mg by mouth nightly as needed)      multivitamin w/minerals (THERA-VIT-M) tablet Take 1 tablet by mouth daily      omeprazole (PRILOSEC) 40 MG DR capsule TAKE 1 CAPSULE BY MOUTH EVERY DAY 30 capsule 11    RESTASIS 0.05 % ophthalmic emulsion Place 1 drop into both eyes 2 times daily      rosuvastatin (CRESTOR) 40 MG tablet Take 1 tablet (40 mg) by mouth daily 90 tablet 3    sildenafil (VIAGRA) 50 MG tablet Take 1 tablet (50 mg) by mouth daily as needed (Erectile dysfunction) 10 tablet 11    sirolimus (GENERIC EQUIVALENT) 1 MG tablet Take 3 tablets (3 mg) by mouth daily 270 tablet 11    tadalafil (CIALIS) 10 MG tablet Take 1 tablet (10 mg) by mouth daily as needed (Erectile dysfunction) 10 tablet 3       EXAM:   /87 (BP Location: Right arm, Patient Position: Chair, Cuff Size: Adult Regular)   Pulse 102   Wt 87.2 kg (192 lb 4.8 oz)   SpO2 99%   BMI 29.89 kg/m    General: appears comfortable, alert and interactive, in no acute distress  Head: normocephalic, atraumatic, mild swelling along right cheek and bottom lip  Eyes: anicteric sclera, EOMI  Mouth: teeth intact, no tenderness or sores in the mouth  Neck: supple, no cervical adenopathy  CV: regular rate and rhythm, S1/S2, no murmur, gallop, rub, estimated JVP ~7 cm  Resp: clear, no rales or wheezing  GI: soft, nontender, nondistended, +BS  Extremities: warm, no peripheral edema, 2+ bilateral radial pulses  Neurological: normal speech and affect, no gross motor deficits  Psych: normal mood and affect  Derm: no rashes or lesions on exposed  surfaces    I personally reviewed recent labs and data as below and discussed the results with the patient in clinic today.  Labs:  CBC RESULTS:  Lab Results   Component Value Date    WBC 4.5 02/21/2024    WBC 5.0 04/09/2021    RBC 4.64 02/21/2024    RBC 4.61 04/09/2021    HGB 12.4 (L) 02/21/2024    HGB 11.2 (A) 04/09/2021    HCT 38.3 (L) 02/21/2024    HCT 34.6 04/09/2021    MCV 83 02/21/2024    MCV 75 04/09/2021    MCH 26.7 02/21/2024    MCH 24.3 04/09/2021    MCHC 32.4 02/21/2024    MCHC 32.4 04/09/2021    RDW 14.5 02/21/2024    RDW 14.9 04/09/2021     02/21/2024     04/09/2021       CMP RESULTS:  Lab Results   Component Value Date     02/21/2024     03/17/2021    POTASSIUM 4.8 02/21/2024    POTASSIUM 4.4 05/28/2022    POTASSIUM 3.9 03/17/2021    CHLORIDE 103 02/21/2024    CHLORIDE 107 05/28/2022    CHLORIDE 106 03/17/2021    CO2 23 02/21/2024    CO2 24 05/28/2022    CO2 24 03/17/2021    ANIONGAP 12 02/21/2024    ANIONGAP 6 05/28/2022    ANIONGAP 8 03/17/2021    GLC 95 02/21/2024    GLC 97 05/28/2022    GLC 85 03/17/2021    BUN 30.6 (H) 02/21/2024    BUN 31 (H) 05/28/2022    BUN 32 (H) 03/17/2021    CR 2.33 (H) 02/21/2024    CR 1.80 (H) 03/17/2021    GFRESTIMATED 31 (L) 02/21/2024    GFRESTIMATED 34 (L) 06/22/2021    GFRESTIMATED 41 (L) 03/17/2021    GFRESTBLACK 41 (L) 06/22/2021    GFRESTBLACK 47 (L) 03/17/2021    RADAMES 9.7 02/21/2024    RADAMES 9.0 03/17/2021    BILITOTAL 0.3 02/21/2024    BILITOTAL 0.5 03/17/2021    ALBUMIN 4.4 02/21/2024    ALBUMIN 3.2 (L) 02/23/2022    ALBUMIN 3.6 03/17/2021    ALKPHOS 116 02/21/2024    ALKPHOS 131 03/17/2021    ALT 24 02/21/2024    ALT 49 03/17/2021    AST 24 02/21/2024    AST 48 (H) 03/17/2021        INR RESULTS:  Lab Results   Component Value Date    INR 1.17 (H) 09/23/2019       Lab Results   Component Value Date    MAG 2.1 02/21/2024    MAG 1.6 03/17/2021     Lab Results   Component Value Date    NTBNPI 767 09/23/2019     Lab Results   Component Value  Date    NTBNP 10986 (H) 11/15/2018     TTE 2/23/23  Interpretation Summary  Left ventricular size, wall motion and function are normal. The ejection  fraction is 55-60%.  On direct comparison, the global right ventricular function appears mildly  reduced compared to 2022, but TAPSE and S' values are similar.  No significant valve abnormalities.  The inferior vena cava is normal.  No pericardial effusion.    Coronary Angiogram/RHC 2/23/23  Coronary Findings    Diagnostic  Dominance: Right  Left Main   The vessel is large. There was 0% vessel disease.      Left Anterior Descending   The vessel is large. There was 0% vessel disease.      Left Circumflex   The vessel is moderate in size. There was 0% vessel disease.      First Obtuse Marginal Branch   The vessel is large.      Right Coronary Artery   The vessel is large. There was 0% vessel disease.         Intervention     No interventions have been documented.     Hemodynamics    Right Heart Catheterization:  /89/114 mmHg  HR 92 BPM    RA 7/11/7 mmHg  RV 40/7 mmHg  PA 40/19/28 mmHg  PCW 17/24/17 mmHg  Jaden CO 7.13 L/min Normal = 4.0-8.0 L/min  Jaden CI 3.64 L/min/m2 Normal = 2.5-4.0 L/min/m2  TD CO 6.93 L/min Normal = 4.0-8.0 L/min  TD CI 3.54 L/min/m2 Normal = 2.5-4.0 L/min/m2  PA sat 72.4%   Hgb 10.7 g/dL   PVR 1.71 Woods units  SVR 1021 dynes-sec/cm5     ECG 2/23/23 shows sinus rhythm with nonspecific intraventricular conduction block, no acute ST-T changes    [facility] 2/23/22  RA: 10  RV: 31 (11)  PA: 30/14 (21)  PVR: 0.91  Jaden CO: 6.3    CI: 3.3  TDCO: 7.4       CI: 3.8     Angiogram 2/23/22  Left Main   The vessel is large. There was 0% vessel disease.   Left Anterior Descending   The vessel is large. There was 0% vessel disease.   Left Circumflex   The vessel is moderate in size. There was 0% vessel disease.   Right Coronary Artery   The vessel is large. There was 0% vessel disease.     CMR 2/23/22  Clinical history: 56-year-old man post orthotopic heart  transplantation in 2019  Comparison CMR: March 2021  1. The left ventricle is normal in cavity size and wall thickness. There are no regional wall motion  abnormalities. The global systolic function is normal. The LVEF is 55%.  2. The right ventricle is normal in cavity size. The global systolic function is normal. The RVEF is 54%.   3. The left atrium is enlarged due to transplantation and the right atrium is normal in size.  4. There is no significant valvular disease.   5. There is patchy midmyocardial late gadolinium enhancement at the basal-mid RV insertion sites consistent with non ischemic fibrosis.    6. There is no ischemia on stress perfusion imaging.  7. There is no pericardial effusion.  8. There is no intracardiac thrombus.  CONCLUSIONS:   Post heart transplantation.  Normal biventricular size and function, LVEF 55 % and RVEF 54%.   No evidence of myocardial ischemia.  No significant changes when compared to prior study from 2021    Echo 8/31/21  Interpretation Summary  Global and regional left ventricular function is normal with an EF of 60-65%.  Global right ventricular function is normal.  Pulmonary artery systolic pressure is normal.  The inferior vena cava was normal in size with preserved respiratory variability.  No pericardial effusion is present.    RHC and angiography 2/12/2020  Pressures Phase   Time  Systolic  Diastolic  Mean  A Wave  V Wave  EDP  Max dp/dt  HR    RA Pressures  10:37 AM    7 mmHg     9 mmHg     7 mmHg       95 bpm       RV Pressures  10:37 AM  30 mmHg         9 mmHg      95 bpm       PA Pressures  10:38 AM  30 mmHg     16 mmHg     20 mmHg         94 bpm       PCW Pressures  10:38 AM    12 mmHg     13 mmHg     13 mmHg       95 bpm       AO Pressures  11:00 AM  94 mmHg     72 mmHg     83 mmHg         98 bpm       Art Pressures  10:59 AM  120 mmHg     74 mmHg     91 mmHg         103 bpm       LV Pressures  10:59 AM  120 mmHg         17 mmHg      102 bpm          Time  Hb   SAT(%)  PO2  Content  PA Sat    PA  10:21 AM   71.5 %       71.5 %       Art  10:21 AM   100 %      14.28 mL/dL        Cardiac Output    Time  TDCO  TDCI  Jaden C.O.  Jaden C.I.  Jaden HR    Cardiac Output Results  10:21 AM  6.77 L/min     3.74 L/min/m2     5.68 L/min     3.13 L/min/m2            Left Main    The vessel was visualized by selective angiography and is large. There was 0% vessel disease.    Left Anterior Descending    The vessel was visualized by selective angiography and is large. There was 0% vessel disease. IVUS was performed on the LMCA and LAD vessels. The LMCA was engaged with a 6 Fr Ikari LF 3.5 GC and the patient was anticoagulated with IV UFH. A BMW was passed into the distal LAD without difficulty. The [place] [facility] IVUS was passed over the wire and manually pulled back while recording. Proximal LAD HERACLIO 0.3 Lumen 24.1 Vessel 29.8 19% area stenosis Mid LAD HERACLIO 0.3 Lumen 10.8 Vessel 13.7 21% area stenosis IVUS was performed on the vessel. Ultrasound supply: [facility] [employer] IVUS SHRT.    Left Circumflex    The vessel was visualized by selective angiography and is moderate in size. There was 0% vessel disease.    Right Coronary Artery    The vessel was visualized by selective angiography and is large. There was 0% vessel disease.    Conclusion  Mild intimal hyperplasia with up to 0.3 mm HERACLIO and 20% narrowing by area.    ECHO 12/18/2019  Interpretation Summary  Left ventricular function, chamber size, wall motion, and wall thickness are  normal.The EF is 60-65%.  Right ventricular function, chamber size, wall motion, and thickness are  normal.  No significant valvular abnormalities were noted.  Pulmonary artery systolic pressure is normal.  The inferior vena cava was normal in size with preserved respiratory  variability.  No pericardial effusion is present.      RHC and EMB 10/15/19:  Conclusion     Right sided filling pressures are normal.  Left sided filling pressures are normal.  Normal PA  pressures.  Normal cardiac output level.  Successful collection of endomyocardial biopsies          Plan      Follow bedrest per protocol   Continued medical management and lifestyle modifications for cardiovascular risk factor optimizations.   Discharge today per protocol   Hemodynamics     Right Heart Pressures     Right sided filling pressures are normal.Left sided filling pressures are normal. Normal PA pressures.Normal cardiac output level.   Heart Biospy     Heart Biopsy     After informed consent patient was prepped and draped in the usual fashion. Under fluoroscopy guidance a 7 Fr angeled sheath was placed into the right internal jugular vein after local anesthesia with 1.0% lidocaine. 5.5 [place] [employer]z bioptome was passed through the sheath to the right ventricular septum and 5 biopsies were obtained. The biopsy specimens were sent to Pathology for examination. The sheath was removed and hemostasis was obtained. The patient tolerated the procedure well and there were no complications.   Pressures Phase: Baseline      Time Systolic Diastolic Mean A Wave V Wave EDP Max dp/dt HR   RA Pressures 11:49 AM   2 mmHg    3 mmHg    4 mmHg      105 bpm      RV Pressures 11:35 AM       1776 mmHg/sec        11:49 AM 22 mmHg        4 mmHg     105 bpm      PA Pressures 11:50 AM 20 mmHg    10 mmHg    14 mmHg        105 bpm      PCW Pressures 11:49 AM   6 mmHg    7 mmHg    7 mmHg      105 bpm      Blood Flow Results Phase: Baseline      Time Results Indexed Values   QP 11:35 AM 5.92 L/min    3.66 L/min/m2      QS 11:35 AM 5.92 L/min    3.66 L/min/m2      Blood Oximetry Phase: Baseline      Time Hb SAT(%) PO2 Content PA Sat   PA 11:35 AM  66.6 %      66.6 %      Art 11:35 AM  100 %     10.74 mL/dL       Cardiac Output Phase: Baseline      Time TDCO TDCI Jaden C.O. Jaden C.I. Jaden HR   Cardiac Output Results 11:35 AM 6.03 L/min    3.74 L/min/m2    5.92 L/min    3.66 L/min/m2        11:51 AM 6.03 L/min          Resistance Results  Phase: Baseline      Time PVR SVR PVR-I SVR-I TPR TVR TPR-I TVR-I PVR/SVR TPR/TVR   Resistance Results (Metric) 11:35 .64 dsc-5     172.25 dsc-5/m2     186.62 dsc-5     301.45 dsc-5/m2         Resistance Results (Wood) 11:35 AM 1.33 MARTIN     2.15 MARTIN/m2     2.33 MARTIN     3.77 MARTIN/m2         Stoke Volume Results Phase: Baseline      Time RVSW LVSW RVSW-I LVSW-I   Stroke Work Results 11:35 AM 9.33 gm*m     5.77 gm*m/m2         TTE 10/15/19:  Interpretation Summary  Left ventricular function, chamber size, wall motion, and wall thickness are  normal.The EF is 60-65%. No regional wall motion abnormalities are seen.  Right ventricular function, chamber size, wall motion, and thickness are  normal.  Trace tricuspid insufficiency is present. Pulmonary artery systolic pressure  is normal. The inferior vena cava was normal in size with preserved  respiratory variability. No pericardial effusion is present.  There has been no change.    Assessment and Plan:    58 year old male with a history of ASD (s/p repair in childhood), AFib/AF (s/p ablation), NICM, diastolic dysfunction, alcohol abuse, Pierce's esophagus, ulcerative colitis, GIB (s/p splenic embolization), and hypothyroidism, now s/p OHT and bypass of persistent left SVC to right atrial appendage 2/24/19 who presents for ongoing evaluation and management.    NICM s/p OHT and bypass of persistent left SVC to right atrial appendage 2/24/19  His postoperative course was c/b shock due to RV dysfunction requiring IABP support, small pneumoperitoneum (clinically insignificant), chest wall hematoma (former ICD site, s/p evacuation and drain placement 3/31/19), HCAP/klebsiella pneumonia, and FRANKLIN (required short-term HD, now recovered). His biopsy 3/25/19 showed mild AMR1 with some staining for c4d.  Repeat biopsy 3/28/19 showed improved AMR and less reactive capillary staining, and subsequent biopsies have now shown resolution of AMR and improved capillary staining.     Today  patient is euvolemic by history and exam. Overall doing well. No CAV and grossly normal filling pressures with normal cardiac index/output on last surveillance testing. cMRI planned for April '24.     Serostatus:  - CMV D+/R+  - EBV D+/R+  - Toxo D-/R-     Immunosuppression:  - Sirolimus 4 mg daily, goal decrease to 4-6, level pending from today  - Azathioprine 100 mg daily, will consider dropping dose given frequent sinus infections pending rest of lab results from today     Graft function:  - BPs: Controlled at home, but slightly elevated here, but took meds late today, continue losartan 75 mg daily. Instructed patient to call clinic if BP consistently > 140/90  - HRs:  Stable  - fluid status:  Euvolemic, off diuretics      PPx:  - CAV: Aspirin 81mg daily and rosuvastatin 40 mg daily (Continue to monitor HLD, if not improving with diet and exercise, will refer to lipid clinic)  - GERD:  Omeprazole (note h/o carrera's esophagus)  - Osteoporosis:  Calcium/vitamin D supplements     Routine screenings  Derm: UTD  Dental: DUE  Colonoscopy: completed 3/8/2022, every 3 years  EGD: 3/8/2022, every 3 years  Prostate:  stable  Eye: UTD  Flu/Pneumonia: UTD  Covid: UTD     Ulcerative Colitis  - Followed by GI    Chronic Kidney Disease Stage III  - Follows with Renal, needs to re-establish    Chronic Sinus Infections  - Follows with ENT  - Discussed that if any surgeries were to be planned in the future, would need to switch sirolimus back to tac for wound healing    Chronic Right Shoulder Pain  - Follows with Ortho    Facial Edema  Started this AM and is mild and one-sided, getting over a recent sinus infection.  - will call PCP for further evaluation    To Do:   - Follow up pending labs from today  - Decrease sirolimus goal to 4-6  - cMRI in April '24  - Needs to see lipid clinic and Nephrology again  - Due for dental  - Follow up with PCP for facial swelling that is mild and started this AM  - SOT labs per protocol  -  Follow up in one year, happy to see sooner if any new or acute issues arise    A total of 44 minutes was spent on the day of the visit, which includes preparation for the visit (reviewing previous medical records, laboratories and investigations), in conjunction with the actual clinic visit with the patient, which includes obtaining a history and physical exam, creating and reviewing the care plan, patient education (and family if present), counseling, documenting clinical information in the electronic health record and care coordination.     The longitudinal plan of care for the condition(s) below were addressed during this visit. Due to the added complexity in care, I will continue to support Everton in the subsequent management of this condition(s) and with the ongoing continuity of care of this condition(s).    Problem List Items Addressed This Visit as of 2/21/2024         Other    Transplanted heart (H)        Immunosuppression        Chronic kidney disease stage III    Slime Lindsay MD   of Medicine, [facility]  Advanced Heart Failure and Transplant Cardiology     CC  Fredrick Wolff

## 2024-02-22 LAB
EBV DNA COPIES/ML, INSTRUMENT: 1042 COPIES/ML
EBV DNA SPEC NAA+PROBE-LOG#: 3 {LOG_COPIES}/ML

## 2024-02-23 ENCOUNTER — MYC MEDICAL ADVICE (OUTPATIENT)
Dept: TRANSPLANT | Facility: CLINIC | Age: 59
End: 2024-02-23
Payer: COMMERCIAL

## 2024-02-23 ENCOUNTER — TELEPHONE (OUTPATIENT)
Dept: TRANSPLANT | Facility: CLINIC | Age: 59
End: 2024-02-23
Payer: COMMERCIAL

## 2024-02-23 DIAGNOSIS — Z94.1 TRANSPLANTED HEART (H): ICD-10-CM

## 2024-02-23 LAB — IMMUKNOW IMMUNE CELL FUNCTION: 209 NG/ML

## 2024-02-23 RX ORDER — AZATHIOPRINE 75 MG/1
75 TABLET ORAL DAILY
Qty: 90 TABLET | Refills: 3 | Status: ON HOLD | OUTPATIENT
Start: 2024-02-23 | End: 2024-09-25

## 2024-02-23 NOTE — TELEPHONE ENCOUNTER
Sirolimus level 12.0, a good 24 hour level; however, pt normally takes sirolimus at 5pm daily and changed dose timing to 8am starting 2/17. Pt has been stable on 3mg daily dose for a while now.    Pt plans to return to 5pm dosing tonight. No changes at this time and pt to repeat sirolimus level on 3/1 or 3/4 between 4-5pm.    Immuknow low at 209.     Await repeat sirolimus level to decide if changes need to be made for immuknow     Pt verbalized understanding.

## 2024-02-28 NOTE — PROGRESS NOTES
CV ICU PROGRESS NOTE  March 13, 2019      CO-MORBIDITIES:   Chronic systolic heart failure (H)  (primary encounter diagnosis)  Acute on chronic systolic congestive heart failure (H)    ASSESSMENT: Everton Larios is a 53 year old male with PMH etoh abuse, hypothyroidism, intermittent asthma, ulcerative colitis, Depression, Chronic HFrEF, NICM admitted with cardiogenic shock requiring subclavian balloon pump now s/p OHT 2/24/19 with Piyush House and Christopher.      TODAY'S PROGRESS:   Discuss narrowing Zosyn with cardiology given thrombocytopenia   Continue CRRT until machine clots, then will transition to iHD (likely tmrw)  Temporary pacing wires removed; chest tubes  to eval output\  Add fiber given ongoing diarrhea    PLAN:  Neuro/ pain/ sedation:  - Monitor neurological status. Notify the MD for any acute changes in exam.  - Tylenol prn for pain. Hold narcotics for altered mental status.    #ICU delirium, slowly improving  - Head CT 3/9 negative  - Optimize sleep/wake cycle, melatonin for sleep      #Depression  - PTA cymbalta 20mg     Pulmonary care:   - Doing well on room air  - Supp oxygen to maintain SpO2 >92%  - Encourage IS and deep breathing  - Bilateral pleural chest tubes to suction.  to eval output.    #Intermittent asthma  - PTA Montelukast ordered    Cardiovascular: HR goal >95. Echo 3/5 with good LV function, mild decreased RV function (moderately dilated).  - R heart cath and biopsy 3/4 and 3/11, biopsies negative for rejection.  - Monitor hemodynamic status.   - MAP >65. HR goal > 95.  - Dobutamine 2.5 mcg/kg/min, sildenafil 20 q8h, terbutaline 10 mg q8h  - Continue statin. No aspirin per cards.   - Pacing wires removed 3/13.     GI care:   #Severe malnutrition  - Regular diet  - NJ tube feeds for nutritional supplementation  - Bowel regimen PRN.   - Fiber given diarrhea.    #Intraperitoneal free air (CT obtained 2/26)   - Small peritoneal defect made during redo operation,  closed with stitches intra-op- likely source of free air. Resolved.    #Pierce's esophagus  #Ulcerative colitis  -PTA Mesalamine     Fluids/ Electrolytes/ Nutrition:   - TKO for IV fluid hydration  - TF for enteral nutritional support as above.  - No indication for parenteral nutrition.  - Replace electrolytes as needed.  #Hyponatremia  - Asymptomatic/improved on CRRT    Renal/ Fluid Balance:   #FRANKLIN, oliguric.    - Etiology likely cardiorenal from venous congestion vs nephrotoxicity from multiple medications  - CRRT started (3/9), await machine to clot, plan to transition to iHD 3/14  - Will continue to monitor intake and output.  - PTA Tamsulosin      Endocrine:    # Stress hyperglycemia  - Resolved. No ongoing BG monitoring.  # hypothyroidism  - Home levothyroxine      ID/ Antibiotics: Perioperative antibiotics vanc/zosyn x 3 days- completed  #Leukocytosis, persistent since OR 2/24, afebrile   - BCx 3/9 x2 NGTD, CDiff 3/10/2019 negative  - BC and swan tip cultured 3/4 for leukocytosis- NGTD.   - Empirically started on Vanc (3/9-3/11). Zosyn (3/9- ) until 3/16. Will discuss narrowing to ancef given zosyn could contribute to thrombocytopenia.  - Valcyte for CMV prophylaxis; holding bactrim for PCP prophylaxis. Pentamidine given on 3/4.   - Nystatin for thrush     Heme:     #Bypass of persistent left SVC to R atrial appendage with Lyford-Davis graft. Will require lifelong anti-coagulation for graft  - Hgb goal >7. Daily CBC.  - Hgb 6.8 3/9, transfused 1 u pRBC, stable 3/10/2019, no s/s bleeding   - Low intensity heparin gtt    Prophylaxis:    - Mechanical prophylaxis for DVT  - Heparin as above  - Protonix qd     Lines/ tubes/ drains:  - RIJ HD line 3/8, LUE PICC 3/9, L pleural chest tubes x2      Disposition:  - CV ICU.     Patient seen, findings and plan discussed with CV ICU staff, Dr. Sesay.     Leyla Bowling MD  General Surgery PGY-3  ====================================    SUBJECTIVE:   Mental status slowly  improving, sleeping more at night  Tolerating fluid removal on CRRT -2L yesterday  Diarrhea worsening and some pills in his stools    OBJECTIVE:   1. VITAL SIGNS:   Temp:  [96.6  F (35.9  C)-99  F (37.2  C)] 97  F (36.1  C)  Heart Rate:  [89-98] 92  Resp:  [12-26] 23  BP: ()/(48-93) 107/61  SpO2:  [99 %-100 %] 100 %  Resp: 23      2. INTAKE/ OUTPUT:   I/O last 3 completed shifts:  In: 3461.4 [P.O.:1210; I.V.:891.4; NG/GT:360]  Out: 5576 [Urine:95; Other:4161; Stool:900; Chest Tube:420]    3. PHYSICAL EXAMINATION:     General: resting comfortably in bed, deconditioned  Neuro: Oriented to person and place, moves all four extremities spontaneously   Resp: NLB on RA  CV: Regular rate, regular rhythm, serous output from b/l pleural chest tubes  Right chest wall hematoma stable, minimal ecchymosis   Abdomen: Soft, mildly distended, non-tender  Incisions: c/d/i, no erythema or drainage  Extremities: warm and well perfused     4. INVESTIGATIONS:   Arterial Blood Gases   No lab results found in last 7 days.  Complete Blood Count   Recent Labs   Lab 03/13/19  1000 03/13/19  0334 03/12/19 2130 03/12/19  1548   WBC 18.0* 14.9* 14.8* 14.5*   HGB 7.0* 7.1* 7.1* 7.3*   PLT 68* 71* 79* 89*     Basic Metabolic Panel  Recent Labs   Lab 03/13/19  1000 03/13/19  0334 03/12/19 2130 03/12/19  1548    137 138 138   POTASSIUM 4.4 3.8 4.0 4.3   CHLORIDE 106 106 106 105   CO2 21 20 22 20   BUN 26 26 25 26   CR 1.34* 1.36* 1.40* 1.46*   * 180* 180* 124*     Liver Function Tests  Recent Labs   Lab 03/13/19  1000 03/13/19  0334 03/12/19  2130 03/12/19  1548  03/11/19  0338   AST 28 30 36 38   < > 29   ALT 25 24 24 25   < > 22   ALKPHOS 105 102 104 107   < > 102   BILITOTAL 0.7 0.8 0.8 0.9   < > 0.9   ALBUMIN 2.4* 2.4* 2.5* 2.6*   < > 2.8*   INR  --   --   --   --   --  1.50*    < > = values in this interval not displayed.     Pancreatic Enzymes  No lab results found in last 7 days.  Coagulation Profile  Recent Labs   Lab  03/11/19  0338   INR 1.50*         5. RADIOLOGY:   Recent Results (from the past 24 hour(s))   XR Chest Port 1 View    Narrative    Exam: XR CHEST PORT 1 VW, 3/13/2019 5:30 AM    Indication: evaluate chest tubes    Comparison: 3/12/2019    Findings:   Single portable view of the chest. Right-sided IJ central venous  catheter terminating in the low SVC. Enteric tube projecting over the  esophagus and stomach. Bilateral chest tubes in similar position.  Left-sided PICC terminating in the left SVC. The cardiomediastinum and  upper abdomen are unchanged. No pleural effusion. Trace apical  pneumothoraces. No new focal airspace opacities. Streaky retrocardiac  opacities likely representing shifting atelectasis.      Impression    Impression:   1. Trace apical pneumothoraces, similar to prior. Chest tubes remain  in place.  2. Additional lines and tubes as above.    I have personally reviewed the examination and initial interpretation  and I agree with the findings.    MICHAEL HOLDER MD       =========================================         27-Sep-2023

## 2024-02-29 LAB
DONOR IDENTIFICATION: NORMAL
DSA COMMENTS: NORMAL
DSA PRESENT: NO
DSA TEST METHOD: NORMAL
ORGAN: NORMAL
SA 1 CELL: NORMAL
SA 1 TEST METHOD: NORMAL
SA 2 CELL: NORMAL
SA 2 TEST METHOD: NORMAL
SA1 HI RISK ABY: NORMAL
SA1 MOD RISK ABY: NORMAL
SA2 HI RISK ABY: NORMAL
SA2 MOD RISK ABY: NORMAL
UNACCEPTABLE ANTIGENS: NORMAL
UNOS CPRA: 0
ZZZSA 1  COMMENTS: NORMAL
ZZZSA 2 COMMENTS: NORMAL

## 2024-03-01 ENCOUNTER — MEDICAL CORRESPONDENCE (OUTPATIENT)
Dept: HEALTH INFORMATION MANAGEMENT | Facility: CLINIC | Age: 59
End: 2024-03-01
Payer: COMMERCIAL

## 2024-03-01 DIAGNOSIS — N18.9 ANEMIA OF CHRONIC RENAL FAILURE: ICD-10-CM

## 2024-03-01 DIAGNOSIS — N25.0 RENAL OSTEODYSTROPHY: ICD-10-CM

## 2024-03-01 DIAGNOSIS — N18.32 CHRONIC KIDNEY DISEASE (CKD) STAGE G3B/A1, MODERATELY DECREASED GLOMERULAR FILTRATION RATE (GFR) BETWEEN 30-44 ML/MIN/1.73 SQUARE METER AND ALBUMINURIA CREATININE RATIO LESS THAN 30 MG/G (H): Primary | ICD-10-CM

## 2024-03-01 DIAGNOSIS — I10 ESSENTIAL HYPERTENSION, MALIGNANT: ICD-10-CM

## 2024-03-01 DIAGNOSIS — D63.1 ANEMIA OF CHRONIC RENAL FAILURE: ICD-10-CM

## 2024-03-04 ENCOUNTER — LAB (OUTPATIENT)
Dept: LAB | Facility: CLINIC | Age: 59
End: 2024-03-04
Payer: COMMERCIAL

## 2024-03-04 DIAGNOSIS — N18.9 ANEMIA OF CHRONIC RENAL FAILURE: ICD-10-CM

## 2024-03-04 DIAGNOSIS — I10 ESSENTIAL HYPERTENSION, MALIGNANT: ICD-10-CM

## 2024-03-04 DIAGNOSIS — N18.32 CHRONIC KIDNEY DISEASE (CKD) STAGE G3B/A1, MODERATELY DECREASED GLOMERULAR FILTRATION RATE (GFR) BETWEEN 30-44 ML/MIN/1.73 SQUARE METER AND ALBUMINURIA CREATININE RATIO LESS THAN 30 MG/G (H): ICD-10-CM

## 2024-03-04 DIAGNOSIS — Z94.1 TRANSPLANTED HEART (H): ICD-10-CM

## 2024-03-04 DIAGNOSIS — N25.0 RENAL OSTEODYSTROPHY: ICD-10-CM

## 2024-03-04 DIAGNOSIS — D63.1 ANEMIA OF CHRONIC RENAL FAILURE: ICD-10-CM

## 2024-03-04 LAB
ALBUMIN UR-MCNC: >=300 MG/DL
APPEARANCE UR: CLEAR
BACTERIA #/AREA URNS HPF: ABNORMAL /HPF
BILIRUB UR QL STRIP: NEGATIVE
COLOR UR AUTO: YELLOW
ERYTHROCYTE [DISTWIDTH] IN BLOOD BY AUTOMATED COUNT: 14.4 % (ref 10–15)
GLUCOSE UR STRIP-MCNC: NEGATIVE MG/DL
HCT VFR BLD AUTO: 31 % (ref 40–53)
HGB BLD-MCNC: 10.7 G/DL (ref 13.3–17.7)
HGB UR QL STRIP: ABNORMAL
HYALINE CASTS #/AREA URNS LPF: ABNORMAL /LPF
KETONES UR STRIP-MCNC: NEGATIVE MG/DL
LEUKOCYTE ESTERASE UR QL STRIP: NEGATIVE
MCH RBC QN AUTO: 28.5 PG (ref 26.5–33)
MCHC RBC AUTO-ENTMCNC: 34.5 G/DL (ref 31.5–36.5)
MCV RBC AUTO: 82 FL (ref 78–100)
MUCOUS THREADS #/AREA URNS LPF: PRESENT /LPF
NITRATE UR QL: NEGATIVE
PH UR STRIP: 5.5 [PH] (ref 5–7)
PLATELET # BLD AUTO: 162 10E3/UL (ref 150–450)
RBC # BLD AUTO: 3.76 10E6/UL (ref 4.4–5.9)
RBC #/AREA URNS AUTO: ABNORMAL /HPF
SP GR UR STRIP: 1.02 (ref 1–1.03)
SQUAMOUS #/AREA URNS AUTO: ABNORMAL /LPF
UROBILINOGEN UR STRIP-ACNC: 0.2 E.U./DL
WBC # BLD AUTO: 4.3 10E3/UL (ref 4–11)
WBC #/AREA URNS AUTO: ABNORMAL /HPF

## 2024-03-04 PROCEDURE — 81001 URINALYSIS AUTO W/SCOPE: CPT

## 2024-03-04 PROCEDURE — 36415 COLL VENOUS BLD VENIPUNCTURE: CPT

## 2024-03-04 PROCEDURE — 80195 ASSAY OF SIROLIMUS: CPT

## 2024-03-04 PROCEDURE — 85027 COMPLETE CBC AUTOMATED: CPT

## 2024-03-04 PROCEDURE — 82728 ASSAY OF FERRITIN: CPT

## 2024-03-04 PROCEDURE — 80053 COMPREHEN METABOLIC PANEL: CPT

## 2024-03-04 PROCEDURE — 84100 ASSAY OF PHOSPHORUS: CPT

## 2024-03-04 PROCEDURE — 83540 ASSAY OF IRON: CPT

## 2024-03-04 PROCEDURE — 84156 ASSAY OF PROTEIN URINE: CPT

## 2024-03-04 PROCEDURE — 83550 IRON BINDING TEST: CPT

## 2024-03-05 LAB
ALBUMIN SERPL BCG-MCNC: 4.2 G/DL (ref 3.5–5.2)
ALP SERPL-CCNC: 104 U/L (ref 40–150)
ALT SERPL W P-5'-P-CCNC: 27 U/L (ref 0–70)
ANION GAP SERPL CALCULATED.3IONS-SCNC: 11 MMOL/L (ref 7–15)
AST SERPL W P-5'-P-CCNC: 24 U/L (ref 0–45)
BILIRUB SERPL-MCNC: 0.2 MG/DL
BUN SERPL-MCNC: 37.9 MG/DL (ref 8–23)
CALCIUM SERPL-MCNC: 8.6 MG/DL (ref 8.8–10.2)
CHLORIDE SERPL-SCNC: 102 MMOL/L (ref 98–107)
CREAT SERPL-MCNC: 2.42 MG/DL (ref 0.67–1.17)
DEPRECATED HCO3 PLAS-SCNC: 23 MMOL/L (ref 22–29)
EGFRCR SERPLBLD CKD-EPI 2021: 30 ML/MIN/1.73M2
FERRITIN SERPL-MCNC: 29 NG/ML (ref 31–409)
GLUCOSE SERPL-MCNC: 112 MG/DL (ref 70–99)
IRON BINDING CAPACITY (ROCHE): 291 UG/DL (ref 240–430)
IRON SATN MFR SERPL: 16 % (ref 15–46)
IRON SERPL-MCNC: 47 UG/DL (ref 61–157)
PHOSPHATE SERPL-MCNC: 3.5 MG/DL (ref 2.5–4.5)
POTASSIUM SERPL-SCNC: 4 MMOL/L (ref 3.4–5.3)
PROT SERPL-MCNC: 7.1 G/DL (ref 6.4–8.3)
SIROLIMUS BLD-MCNC: 8.8 UG/L (ref 5–15)
SODIUM SERPL-SCNC: 136 MMOL/L (ref 135–145)
TME LAST DOSE: NORMAL H
TME LAST DOSE: NORMAL H

## 2024-03-06 DIAGNOSIS — Z94.1 TRANSPLANTED HEART (H): ICD-10-CM

## 2024-03-06 LAB
ALBUMIN MFR UR ELPH: 316 MG/DL
CREAT UR-MCNC: 128 MG/DL
PROT/CREAT 24H UR: 2.47 MG/MG CR (ref 0–0.2)

## 2024-03-06 RX ORDER — SIROLIMUS 1 MG/1
2 TABLET, FILM COATED ORAL DAILY
COMMUNITY
Start: 2024-03-06 | End: 2024-05-21

## 2024-03-06 NOTE — RESULT ENCOUNTER NOTE
Rapa level 8.8. goal 5-7. Current dose 3 mg once a day. Decrease to 2 mg once a day and repeat in 1 week.

## 2024-03-07 ENCOUNTER — OFFICE VISIT (OUTPATIENT)
Dept: INTERNAL MEDICINE | Facility: CLINIC | Age: 59
End: 2024-03-07
Payer: COMMERCIAL

## 2024-03-07 VITALS
SYSTOLIC BLOOD PRESSURE: 114 MMHG | DIASTOLIC BLOOD PRESSURE: 72 MMHG | RESPIRATION RATE: 18 BRPM | TEMPERATURE: 98.4 F | HEIGHT: 67 IN | WEIGHT: 191 LBS | HEART RATE: 118 BPM | OXYGEN SATURATION: 98 % | BODY MASS INDEX: 29.98 KG/M2

## 2024-03-07 DIAGNOSIS — R05.1 ACUTE COUGH: ICD-10-CM

## 2024-03-07 DIAGNOSIS — J45.41 MODERATE PERSISTENT ASTHMA WITH EXACERBATION: Primary | ICD-10-CM

## 2024-03-07 DIAGNOSIS — N18.30 CHRONIC RENAL INSUFFICIENCY, STAGE 3 (MODERATE) (H): ICD-10-CM

## 2024-03-07 DIAGNOSIS — Z94.1 HEART REPLACED BY TRANSPLANT (H): ICD-10-CM

## 2024-03-07 DIAGNOSIS — I10 ESSENTIAL HYPERTENSION: ICD-10-CM

## 2024-03-07 DIAGNOSIS — E03.9 HYPOTHYROIDISM, UNSPECIFIED TYPE: ICD-10-CM

## 2024-03-07 LAB
FLUAV RNA SPEC QL NAA+PROBE: NEGATIVE
FLUBV RNA RESP QL NAA+PROBE: NEGATIVE
RSV RNA SPEC NAA+PROBE: NEGATIVE
SARS-COV-2 RNA RESP QL NAA+PROBE: NEGATIVE

## 2024-03-07 PROCEDURE — 87637 SARSCOV2&INF A&B&RSV AMP PRB: CPT | Performed by: INTERNAL MEDICINE

## 2024-03-07 PROCEDURE — 99214 OFFICE O/P EST MOD 30 MIN: CPT | Performed by: INTERNAL MEDICINE

## 2024-03-07 RX ORDER — PREDNISONE 20 MG/1
20 TABLET ORAL DAILY
Qty: 5 TABLET | Refills: 0 | Status: SHIPPED | OUTPATIENT
Start: 2024-03-07 | End: 2024-03-12

## 2024-03-07 ASSESSMENT — ASTHMA QUESTIONNAIRES
QUESTION_5 LAST FOUR WEEKS HOW WOULD YOU RATE YOUR ASTHMA CONTROL: SOMEWHAT CONTROLLED
QUESTION_2 LAST FOUR WEEKS HOW OFTEN HAVE YOU HAD SHORTNESS OF BREATH: ONCE OR TWICE A WEEK
QUESTION_5 LAST FOUR WEEKS HOW WOULD YOU RATE YOUR ASTHMA CONTROL: POORLY CONTROLLED
QUESTION_3 LAST FOUR WEEKS HOW OFTEN DID YOUR ASTHMA SYMPTOMS (WHEEZING, COUGHING, SHORTNESS OF BREATH, CHEST TIGHTNESS OR PAIN) WAKE YOU UP AT NIGHT OR EARLIER THAN USUAL IN THE MORNING: ONCE OR TWICE
QUESTION_1 LAST FOUR WEEKS HOW MUCH OF THE TIME DID YOUR ASTHMA KEEP YOU FROM GETTING AS MUCH DONE AT WORK, SCHOOL OR AT HOME: A LITTLE OF THE TIME
QUESTION_4 LAST FOUR WEEKS HOW OFTEN HAVE YOU USED YOUR RESCUE INHALER OR NEBULIZER MEDICATION (SUCH AS ALBUTEROL): ONE OR TWO TIMES PER DAY
ACT_TOTALSCORE: 15
QUESTION_2 LAST FOUR WEEKS HOW OFTEN HAVE YOU HAD SHORTNESS OF BREATH: ONCE A DAY
QUESTION_1 LAST FOUR WEEKS HOW MUCH OF THE TIME DID YOUR ASTHMA KEEP YOU FROM GETTING AS MUCH DONE AT WORK, SCHOOL OR AT HOME: A LITTLE OF THE TIME
QUESTION_3 LAST FOUR WEEKS HOW OFTEN DID YOUR ASTHMA SYMPTOMS (WHEEZING, COUGHING, SHORTNESS OF BREATH, CHEST TIGHTNESS OR PAIN) WAKE YOU UP AT NIGHT OR EARLIER THAN USUAL IN THE MORNING: TWO OR THREE NIGHTS A WEEK
ACT_TOTALSCORE: 15
QUESTION_4 LAST FOUR WEEKS HOW OFTEN HAVE YOU USED YOUR RESCUE INHALER OR NEBULIZER MEDICATION (SUCH AS ALBUTEROL): TWO OR THREE TIMES PER WEEK
ACT_TOTALSCORE: 15

## 2024-03-07 ASSESSMENT — PATIENT HEALTH QUESTIONNAIRE - PHQ9
10. IF YOU CHECKED OFF ANY PROBLEMS, HOW DIFFICULT HAVE THESE PROBLEMS MADE IT FOR YOU TO DO YOUR WORK, TAKE CARE OF THINGS AT HOME, OR GET ALONG WITH OTHER PEOPLE: NOT DIFFICULT AT ALL
SUM OF ALL RESPONSES TO PHQ QUESTIONS 1-9: 4
SUM OF ALL RESPONSES TO PHQ QUESTIONS 1-9: 4

## 2024-03-07 ASSESSMENT — ENCOUNTER SYMPTOMS: COUGH: 1

## 2024-03-07 NOTE — PATIENT INSTRUCTIONS
Continue with your asthma inhalers.    But if you are not continuing to get better in the next 1-2 days, you can proceed with Augmentin antibiotic for sinusitis/bronchitis.  If you are having worsening symptoms, start the prednisone 20 mg a day for 5 days.    Look on your [employer] computer portal for results of today's nasal swab.    You are due for a physical exam after May 8

## 2024-03-07 NOTE — PROGRESS NOTES
Everton Larios   58 year old male    Date of Visit: 3/7/2024    Chief Complaint   Patient presents with    Cough     Fever, congestion     Subjective  58-year-old male with history of cardiac transplant and asthma, asthma usually well-controlled on Flovent.    Patient has had a series of viral URIs over the winter including COVID-positive illness 6 weeks ago.    5 days ago patient began having a cough with some mild shortness of breath and increased wheezing.  He became more ill the next day, and then even more ill the next day with some asthma wheezing spells that were more severe.  Increased cough but minimally productive.  He did have a low-grade fever yesterday of 100.8.  Some nasal congestion but not severe sinus pain and no ear pain.  No headache.  Not having prominent myalgias.  No GI symptoms or rash.    He is influenza immunized but not RSV immunized.    On immunosuppression for cardiac transplant.    He has moderate chronic renal sufficiency with a creatinine of 2.2-2.4.    Blood pressure has been controlled on amlodipine and losartan.    He is on Synthroid but normal TSH last month.    History of ulcerative colitis without flare.    He does take Cymbalta.    PMHx:    Past Medical History:   Diagnosis Date    Alcohol abuse     Arthritis     hands, neck    ASD (atrial septal defect)     s/p repair age 3    Asthma     Atrial fibrillation (H)     Pierce's esophagus 10/4/2018    Pierce's esophagus     Cataract     CHF (congestive heart failure) (H)     Chronic rhinitis 10/4/2018    Chronic systolic heart failure (H) 10/4/2018    Clotting disorder (H24)     Congenital cardiomyopathy in  (H)     Depression 10/4/2018    Depression     Diastolic dysfunction     DJD (degenerative joint disease)     neck    DJD (degenerative joint disease) - neck 10/4/2018    H/O congenital atrial septal defect (ASD) repair at age 3 10/4/2018    History of anesthesia complications     nausea    History of transfusion      Hypothyroidism     Hypothyroidism due to medication 10/4/2018    ICD (implantable cardioverter-defibrillator) in place     ICD, [place] [employer] 2008; gen change 2/2018 10/4/2018    Intermittent asthma without complication 10/4/2018    Nonischemic cardiomyopathy (H) 10/4/2018    On amiodarone therapy 10/4/2018    Pacemaker     Paroxysmal atrial fibrillation (H) 10/4/2018    S/P ablation of atrial fibrillation 10/4/2018    Systolic heart failure (H)     Ulcerative colitis (H) 2005    Ulcerative colitis (H)     Ventricular tachycardia (H) 10/4/2018    Ventricular tachycardia (H)      PSHx:    Past Surgical History:   Procedure Laterality Date    ABLATION OF DYSRHYTHMIC FOCUS      for A fib    AICD, DUAL CHAMBER      ASD REPAIR  01/01/1968    BRONCHOSCOPY (RIGID OR FLEXIBLE), DIAGNOSTIC N/A 04/30/2019    Procedure: BRONCHOSCOPY, WITH BAL;  Surgeon: Margot Chiang MD;  Location:  GI    CARDIAC DEFIBRILLATOR PLACEMENT      x2--last was Feb 2018    COLONOSCOPY N/A 02/20/2020    Procedure: COLONOSCOPY, WITH POLYPECTOMY AND BIOPSY;  Surgeon: Ranjeet Cooper MD;  Location:  GI    COLONOSCOPY N/A 02/05/2015    Procedure: COLONOSCOPY with biopsy;  Surgeon: Fernie Akers MD;  Location: [facility] GI;  Service:     COLONOSCOPY N/A 12/19/2017    Procedure: COLONOSCOPY with biopsies and polypectomies using snare;  Surgeon: Gael Romero MD;  Location: [facility] GI;  Service:     COLONOSCOPY N/A 3/8/2022    Procedure: COLONOSCOPY, WITH POLYPECTOMY AND BIOPSY;  Surgeon: Paddy Saldivar DO;  Location:  GI    CV CORONARY ANGIOGRAM N/A 02/12/2020    Procedure: CV CORONARY ANGIOGRAM;  Surgeon: Isiah Mcallister MD;  Location:  HEART CARDIAC CATH LAB    CV CORONARY ANGIOGRAM N/A 03/17/2021    Procedure: CV CORONARY ANGIOGRAM;  Surgeon: Santino Mckeon MD;  Location:  HEART CARDIAC CATH LAB    CV CORONARY ANGIOGRAM N/A 2/23/2022    Procedure: CV CORONARY ANGIOGRAM;  Surgeon: Yves Rodrigues MD;   [facility]    CV CORONARY ANGIOGRAM N/A 2/23/2023    Procedure: Coronary Angiogram;  Surgeon: Santino Mckeon MD;  [facility]    CV HEART BIOPSY N/A 03/04/2019    Procedure: Heart Biopsy;  Surgeon: Abhijeet Titus MD;  [facility]    CV HEART BIOPSY N/A 03/25/2019    Procedure: Heart Biopsy;  Surgeon: Yves Rodrigues MD;  [facility]    CV HEART BIOPSY N/A 03/28/2019    Procedure: Heart Cath Heart Biopsy;  Surgeon: Yves Rodrigues MD;  [facility]    CV HEART BIOPSY N/A 04/10/2019    Procedure: CV HEART BIOPSY;  Surgeon: Isiah Mcallister MD;  [facility]    CV HEART BIOPSY N/A 04/24/2019    Procedure: CV HEART BIOPSY;  Surgeon: Isiah Mcallister MD;  [facility]    CV HEART BIOPSY N/A 05/08/2019    Procedure: CV HEART BIOPSY;  Surgeon: Isiah Mcallister MD;  [facility]    CV HEART BIOPSY N/A 06/04/2019    Procedure: CV HEART BIOPSY;  Surgeon: Alton Solomon MD;  [facility]    CV HEART BIOPSY N/A 05/22/2019    Procedure: CV HEART BIOPSY;  Surgeon: Yves Rodrigues MD;  [facility]    CV HEART BIOPSY N/A 07/17/2019    Procedure: CV HEART BIOPSY;  Surgeon: Isiah Mcallister MD;  [facility]    CV HEART BIOPSY N/A 08/13/2019    Procedure: CV HEART BIOPSY;  Surgeon: Jackson Patten MD;  [facility]    CV HEART BIOPSY N/A 10/15/2019    Procedure: CV HEART BIOPSY;  Surgeon: Santino Mckeon MD;  [facility]    CV HEART BIOPSY N/A 02/12/2020    Procedure: CV HEART BIOPSY;  Surgeon: Isiah cMallister MD;  [facility]    CV HEART BIOPSY N/A 05/05/2020    Procedure: CV HEART BIOPSY;  Surgeon: Yves Rodrigues MD;  Location: [facility]  CARDIAC CATH LAB    CV HEART BIOPSY N/A 03/17/2021    Procedure: CV HEART BIOPSY;  Surgeon: Santino Mckeon MD;  [facility]    CV HEART BIOPSY N/A 5/10/2022    Procedure: Heart Biopsy;  Surgeon: Yves Rodrigues MD;  Location:  HEART CARDIAC CATH LAB    CV INTRA AORTIC BALLOON N/A 02/18/2019    Procedure: Intra-Aortic Balloon;  Surgeon: Alton Solomon MD;  Location:  HEART CARDIAC CATH LAB    CV INTRA AORTIC BALLOON N/A 02/20/2019    Procedure: Replace subclavian IABP;  Surgeon: Yves Rodrigues MD;  Location:  HEART CARDIAC CATH LAB    CV INTRAVASULAR ULTRASOUND N/A 02/12/2020    Procedure: CV INTRAVASCULAR ULTRASOUND;  Surgeon: Isiah Mcallister MD;  Location:  HEART CARDIAC CATH LAB    CV LEFT HEART CATH N/A 03/19/2019    Procedure: Left Heart Cath;  Surgeon: Yves Rodrigues MD;  Location:  HEART CARDIAC CATH LAB    CV LEFT HEART CATH N/A 02/12/2020    Procedure: Left Heart Cath;  Surgeon: Isiah Mcallister MD;  Location:  HEART CARDIAC CATH LAB    CV MYOCARDIAL BIOPSY N/A 03/19/2019    Procedure: RHC/HBx - Femoral access for 3/19 per Nimisha GONZALEZ;  Surgeon: Yves Rodrigues MD;  Location:  HEART CARDIAC CATH LAB    CV MYOCARDIAL BIOPSY N/A 03/11/2019    Procedure: ADD ON RHC/HBX;  Surgeon: Alton Solomon MD;  Location:  HEART CARDIAC CATH LAB    CV RIGHT HEART CATH MEASUREMENTS RECORDED N/A 03/25/2019    Procedure: Right Heart Cath;  Surgeon: Yves Rodrigues MD;  Location:  HEART CARDIAC CATH LAB    CV RIGHT HEART CATH MEASUREMENTS RECORDED N/A 03/28/2019    Procedure: Heart Cath Right Heart Cath;  Surgeon: Yves Rodrigues MD;  Location:  HEART CARDIAC CATH LAB    CV RIGHT HEART CATH MEASUREMENTS RECORDED N/A 04/24/2019    Procedure: CV RIGHT HEART CATH;  Surgeon: Isiah Mcallister MD;  Location:  HEART CARDIAC CATH LAB    CV RIGHT HEART CATH MEASUREMENTS RECORDED N/A 06/04/2019    Procedure: CV RIGHT HEART CATH;  Surgeon:  Alton Solomon MD;  Location:  HEART CARDIAC CATH LAB    CV RIGHT HEART CATH MEASUREMENTS RECORDED N/A 05/22/2019    Procedure: CV RIGHT HEART CATH;  Surgeon: Yves Rodrigues MD;  Location:  HEART CARDIAC CATH LAB    CV RIGHT HEART CATH MEASUREMENTS RECORDED N/A 08/13/2019    Procedure: CV RIGHT HEART CATH;  Surgeon: Jackson Patten MD;  Location:  HEART CARDIAC CATH LAB    CV RIGHT HEART CATH MEASUREMENTS RECORDED N/A 10/15/2019    Procedure: CV RIGHT HEART CATH;  Surgeon: Santino Mckeon MD;  Location:  HEART CARDIAC CATH LAB    CV RIGHT HEART CATH MEASUREMENTS RECORDED N/A 02/12/2020    Procedure: CV RIGHT HEART CATH;  Surgeon: Isiah Mcallister MD;  Location:  HEART CARDIAC CATH LAB    CV RIGHT HEART CATH MEASUREMENTS RECORDED N/A 03/17/2021    Procedure: CV RIGHT HEART CATH;  Surgeon: Santino Mckeon MD;  Location:  HEART CARDIAC CATH LAB    CV RIGHT HEART CATH MEASUREMENTS RECORDED N/A 2/23/2022    Procedure: CV RIGHT HEART CATH;  Surgeon: Yves Rodrigues MD;  Location:  HEART CARDIAC CATH LAB    CV RIGHT HEART CATH MEASUREMENTS RECORDED N/A 5/10/2022    Procedure: Right Heart Catheterization;  Surgeon: Yves Rodrigues MD;  Location:  HEART CARDIAC CATH LAB    CV RIGHT HEART CATH MEASUREMENTS RECORDED N/A 2/23/2023    Procedure: Right Heart Catheterization;  Surgeon: Santino Mckeon MD;  Location:  HEART CARDIAC CATH LAB    ESOPHAGOSCOPY, GASTROSCOPY, DUODENOSCOPY (EGD), COMBINED N/A 12/19/2017    Procedure: ESOPHAGOGASTRODUODENOSCOPY (EGD) with biopsies;  Surgeon: Gael Romero MD;  Location: [facility];  Service:     ESOPHAGOSCOPY, GASTROSCOPY, DUODENOSCOPY (EGD), COMBINED N/A 3/8/2022    Procedure: ESOPHAGOGASTRODUODENOSCOPY, WITH BIOPSY;  Surgeon: Paddy Saldivar DO;  Location: [facility]    H STATISTIC PICC LINE INSERTION >5YR, FAILED Bilateral 10/28/2021    L sided SVC    HAND SURGERY Left     HC REVISE MEDIAN N/CARPAL TUNNEL SURG  09/21/2016     Procedure: OPEN RIGHT CARPAL TUNNEL RELEASE CORTISONE INJECTION RIGHT THUMB;  Surgeon: Duong Santoyo MD;  Location: [facility] OR;  Service: Orthopedics    INCISION AND DRAINAGE CHEST WASHOUT, COMBINED N/A 03/21/2019    Procedure: Incision And Drainage; Evacuation Right Chest Wall Hematoma;  Surgeon: Dewayne House MD;  [facility]    INSERT INTRAAORTIC BALLOON PUMP Left 02/19/2019    Procedure: Insert left  Subclavian Balloon Pump,  Removal Right femoral arterial balloom pump sheath;  Surgeon: Dewayne House MD;  [facility]    INSERT INTRAAORTIC BALLOON PUMP Left 02/21/2019    Procedure: SUBCLAVIAN BALLOON PUMP PLACEMENT;  Surgeon: Ben White MD;  [facility]    INSERT INTRACORONARY STENT      x2    IR PICC PLACEMENT > 5 YRS OF AGE  05/08/2019    TRANSPLANT HEART RECIPIENT N/A 02/24/2019    Procedure: TRANSPLANT HEART RECIPIENT;  Surgeon: Dewayne House MD;  [facility]     Immunizations:   Immunization History   Administered Date(s) Administered    COVID-19 12+ (2023-24) (Pfizer) 09/17/2023    COVID-19 Bivalent 12+ (Pfizer) 11/05/2022, 05/07/2023    COVID-19 MONOVALENT 12+ (Pfizer) 03/19/2021, 04/09/2021, 08/21/2021    COVID-19 Monovalent 12+ (Pfizer 2022) 04/02/2022    Influenza (IIV3) PF 10/22/2008, 09/25/2009, 11/07/2011, 11/30/2012, 10/27/2013, 10/02/2014    Influenza Vaccine >6 months,quad, PF 10/02/2015, 09/20/2019, 09/16/2020, 09/28/2021, 11/12/2022    Influenza Vaccine, 6+MO IM (QUADRIVALENT W/PRESERVATIVES) 09/12/2016, 10/16/2017, 09/20/2018, 09/20/2019    Influenza,INJ,MDCK,PF,Quad >6mo(Flucelvax) 09/17/2023    OPV, trivalent, live 10/20/1978    Pneumo Conj 13-V (2010&after) 09/26/2018    Pneumococcal 23 valent 09/28/2019    TDAP Vaccine (Boostrix) 07/20/2012    Td (Adult), Adsorbed 10/20/1978       ROS A comprehensive review of systems was performed and was otherwise negative    Medications, allergies, and problem list were reviewed and  "updated    Exam  /72   Pulse 118   Temp 98.4  F (36.9  C)   Resp 18   Ht 1.709 m (5' 7.3\")   Wt 86.6 kg (191 lb)   SpO2 98%   BMI 29.65 kg/m    Lungs do have some slight expiratory wheezing but not prominent.  Respiratory excursion is still fairly good.  There is some mild rhonchi and coarse breath sounds.  No crackles or dullness.  He has some mild postnasal drip type pharyngitis but without exudate.  Tympanic membranes are normal.  No conjunctivitis.  No cervical adenopathy.  Heart is tachycardic which is baseline with his transplant.  No edema    Assessment/Plan  1. Moderate persistent asthma with exacerbation  I suspect another viral URI with asthma exacerbation.  He just had COVID 6 weeks ago therefore I would be less suspicious this is acute COVID, but I will have him swabbed today.    He is having an asthma exacerbation, but feeling somewhat better today compared to yesterday.  Continue with Flovent and albuterol inhalers.    If he has worsening symptoms he does have an option for 5-day course of prednisone.    If he is not getting better in the next couple of days he can proceed with Augmentin antibiotic for bronchitis/sinusitis treatment.  I did look up interactions with azathioprine on the Internet and there was no interaction listed for Augmentin  - predniSONE (DELTASONE) 20 MG tablet; Take 1 tablet (20 mg) by mouth daily for 5 days If needed for worsening asthma exacerbation  Dispense: 5 tablet; Refill: 0  - amoxicillin-clavulanate (AUGMENTIN) 875-125 MG tablet; Take 1 tablet by mouth 2 times daily for 7 days  Dispense: 14 tablet; Refill: 0    2. Essential hypertension  Controlled.  Continue current amlodipine and losartan    3. Chronic renal insufficiency, stage 3 (moderate) (H)  Within baseline range on check on March 4    4. Hypothyroidism, unspecified type  Normal TSH and February on current Synthroid    5. Heart replaced by transplant (H)  Managed by his transplant team.  He was told to " discuss his immunosuppression management with transplant team    6. Acute cough  Consistent with viral URI, low suspicion for influenza or COVID, however  - Symptomatic Influenza A/B, RSV, & SARS-CoV2 PCR (COVID-19)    No flare of ulcerative colitis      Return in about 9 weeks (around 5/9/2024) for Health maintenance physical exam.   Patient Instructions   Continue with your asthma inhalers.    But if you are not continuing to get better in the next 1-2 days, you can proceed with Augmentin antibiotic for sinusitis/bronchitis.  If you are having worsening symptoms, start the prednisone 20 mg a day for 5 days.    Look on your [employer] computer portal for results of today's nasal swab.    You are due for a physical exam after May 8        Fredrick Wolff MD, MD        Current Outpatient Medications   Medication Sig Dispense Refill    acetaminophen (TYLENOL) 325 MG tablet Take 2 tablets (650 mg) by mouth every 4 hours as needed for mild pain      albuterol (PROAIR HFA/PROVENTIL HFA/VENTOLIN HFA) 108 (90 Base) MCG/ACT inhaler Inhale 2 puffs into the lungs every 6 hours as needed for shortness of breath or wheezing 18 g 5    amLODIPine (NORVASC) 5 MG tablet Take 2 tablets (10 mg) by mouth daily 180 tablet 11    amoxicillin-clavulanate (AUGMENTIN) 875-125 MG tablet Take 1 tablet by mouth 2 times daily for 7 days 14 tablet 0    aspirin (ASA) 81 MG chewable tablet Take 1 tablet (81 mg) by mouth daily      azaTHIOprine 75 MG TABS Take 75 mg by mouth daily 90 tablet 3    calcium carbonate 600 mg-vitamin D 400 units (CALTRATE) 600-400 MG-UNIT per tablet Take 1 tablet by mouth 2 times daily (with meals)      cetirizine (ZYRTEC) 10 MG tablet Take 10 mg by mouth daily      clindamycin (CLINDAMAX) 1 % external gel Apply twice daily as needed for acne on the spots 60 g 11    DULoxetine (CYMBALTA) 20 MG capsule Take 1 capsule (20 mg) by mouth daily 90 capsule 3    fluticasone (FLOVENT HFA) 220 MCG/ACT inhaler Inhale 2 puffs into the  lungs 2 times daily 12 g 3    levothyroxine (SYNTHROID/LEVOTHROID) 75 MCG tablet Take 1 tablet (75 mcg) by mouth daily 90 tablet 3    losartan (COZAAR) 25 MG tablet Take 3 tablets (75 mg) by mouth daily 270 tablet 3    melatonin 3 MG tablet Take 2 tablets (6 mg) by mouth At Bedtime (Patient taking differently: Take 3 mg by mouth nightly as needed)      multivitamin w/minerals (THERA-VIT-M) tablet Take 1 tablet by mouth daily      omeprazole (PRILOSEC) 40 MG DR capsule TAKE 1 CAPSULE BY MOUTH EVERY DAY 30 capsule 11    predniSONE (DELTASONE) 20 MG tablet Take 1 tablet (20 mg) by mouth daily for 5 days If needed for worsening asthma exacerbation 5 tablet 0    RESTASIS 0.05 % ophthalmic emulsion Place 1 drop into both eyes 2 times daily      rosuvastatin (CRESTOR) 40 MG tablet Take 1 tablet (40 mg) by mouth daily 90 tablet 3    sildenafil (VIAGRA) 50 MG tablet Take 1 tablet (50 mg) by mouth daily as needed (Erectile dysfunction) 10 tablet 11    sirolimus (GENERIC EQUIVALENT) 1 MG tablet Take 2 tablets (2 mg) by mouth daily      tadalafil (CIALIS) 10 MG tablet Take 1 tablet (10 mg) by mouth daily as needed (Erectile dysfunction) 10 tablet 3     Allergies   Allergen Reactions    Hydromorphone Other (See Comments)     Significant Delirium    Adhesive Tape Blisters     Tegaderm causes bruises, blisters and extreme irritation.    Lisinopril Other (See Comments)     hypotension    Quinolones Other (See Comments)     Would not rx quinolones until QTc interval improved.     Tobramycin Other (See Comments)     Would not use aminoglycosides as his kidney function is very borderline    Codeine Nausea     Social History     Tobacco Use    Smoking status: Former     Packs/day: 1.00     Years: 25.00     Additional pack years: 0.00     Total pack years: 25.00     Types: Cigarettes     Start date: 1/1/1980     Quit date: 11/6/2008     Years since quitting: 15.3     Passive exposure: Past    Smokeless tobacco: Never   Vaping Use     Vaping Use: Never used   Substance Use Topics    Alcohol use: Yes     Alcohol/week: 1.0 - 2.0 standard drink of alcohol     Types: 1 - 2 Cans of beer per week    Drug use: No             Subjective   Everton is a 58 year old, presenting for the following health issues:  Cough (Fever, congestion)      3/7/2024     9:50 AM   Additional Questions   Roomed by Rosalind WILLSON   Accompanied by rafal     Via the Health Maintenance questionnaire, the patient has reported the following services have been completed -Eye Exam, this information has been sent to the abstraction team.  Cough    History of Present Illness       Reason for visit:  Not feeling well  Symptom onset:  1-3 days ago  Symptoms include:  Cough, sneezing wheezing sometimes difficulty breathing  Symptom intensity:  Moderate  Symptom progression:  Improving  Had these symptoms before:  Yes  Has tried/received treatment for these symptoms:  Yes  Previous treatment was successful:  No  What makes it worse:  No  What makes it better:  Alturnate ice and heat on chest, heat over sinuses    He eats 0-1 servings of fruits and vegetables daily.He consumes 0 sweetened beverage(s) daily.He exercises with enough effort to increase his heart rate 10 to 19 minutes per day.  He exercises with enough effort to increase his heart rate 4 days per week.   He is taking medications regularly.                     Objective    There were no vitals taken for this visit.  There is no height or weight on file to calculate BMI.  Physical Exam               Signed Electronically by: Fredrick Wolff MD

## 2024-03-11 DIAGNOSIS — Z94.1 HEART REPLACED BY TRANSPLANT (H): Primary | ICD-10-CM

## 2024-03-14 ENCOUNTER — LAB (OUTPATIENT)
Dept: LAB | Facility: CLINIC | Age: 59
End: 2024-03-14
Payer: COMMERCIAL

## 2024-03-14 DIAGNOSIS — Z94.1 HEART REPLACED BY TRANSPLANT (H): ICD-10-CM

## 2024-03-14 LAB
BASOPHILS # BLD AUTO: 0 10E3/UL (ref 0–0.2)
BASOPHILS NFR BLD AUTO: 0 %
EOSINOPHIL # BLD AUTO: 0.1 10E3/UL (ref 0–0.7)
EOSINOPHIL NFR BLD AUTO: 4 %
ERYTHROCYTE [DISTWIDTH] IN BLOOD BY AUTOMATED COUNT: 14.2 % (ref 10–15)
HCT VFR BLD AUTO: 29.7 % (ref 40–53)
HGB BLD-MCNC: 10.1 G/DL (ref 13.3–17.7)
IMM GRANULOCYTES # BLD: 0 10E3/UL
IMM GRANULOCYTES NFR BLD: 0 %
LYMPHOCYTES # BLD AUTO: 0.9 10E3/UL (ref 0.8–5.3)
LYMPHOCYTES NFR BLD AUTO: 26 %
MCH RBC QN AUTO: 28.1 PG (ref 26.5–33)
MCHC RBC AUTO-ENTMCNC: 34 G/DL (ref 31.5–36.5)
MCV RBC AUTO: 83 FL (ref 78–100)
MONOCYTES # BLD AUTO: 0.4 10E3/UL (ref 0–1.3)
MONOCYTES NFR BLD AUTO: 12 %
NEUTROPHILS # BLD AUTO: 2.1 10E3/UL (ref 1.6–8.3)
NEUTROPHILS NFR BLD AUTO: 58 %
PLATELET # BLD AUTO: 188 10E3/UL (ref 150–450)
RBC # BLD AUTO: 3.6 10E6/UL (ref 4.4–5.9)
WBC # BLD AUTO: 3.6 10E3/UL (ref 4–11)

## 2024-03-14 PROCEDURE — 85025 COMPLETE CBC W/AUTO DIFF WBC: CPT

## 2024-03-14 PROCEDURE — 80048 BASIC METABOLIC PNL TOTAL CA: CPT

## 2024-03-14 PROCEDURE — 80195 ASSAY OF SIROLIMUS: CPT

## 2024-03-14 PROCEDURE — 36415 COLL VENOUS BLD VENIPUNCTURE: CPT

## 2024-03-15 LAB
ANION GAP SERPL CALCULATED.3IONS-SCNC: 11 MMOL/L (ref 7–15)
BUN SERPL-MCNC: 35.2 MG/DL (ref 8–23)
CALCIUM SERPL-MCNC: 9 MG/DL (ref 8.8–10.2)
CHLORIDE SERPL-SCNC: 104 MMOL/L (ref 98–107)
CREAT SERPL-MCNC: 2.25 MG/DL (ref 0.67–1.17)
DEPRECATED HCO3 PLAS-SCNC: 21 MMOL/L (ref 22–29)
EGFRCR SERPLBLD CKD-EPI 2021: 33 ML/MIN/1.73M2
GLUCOSE SERPL-MCNC: 103 MG/DL (ref 70–99)
POTASSIUM SERPL-SCNC: 4.6 MMOL/L (ref 3.4–5.3)
SIROLIMUS BLD-MCNC: 7.4 UG/L (ref 5–15)
SODIUM SERPL-SCNC: 136 MMOL/L (ref 135–145)
TME LAST DOSE: NORMAL H
TME LAST DOSE: NORMAL H

## 2024-03-18 DIAGNOSIS — F34.1 DYSTHYMIA: ICD-10-CM

## 2024-03-18 RX ORDER — DULOXETIN HYDROCHLORIDE 20 MG/1
20 CAPSULE, DELAYED RELEASE ORAL DAILY
Qty: 90 CAPSULE | Refills: 2 | Status: SHIPPED | OUTPATIENT
Start: 2024-03-18

## 2024-03-25 DIAGNOSIS — Z94.1 HEART REPLACED BY TRANSPLANT (H): ICD-10-CM

## 2024-03-25 RX ORDER — ROSUVASTATIN CALCIUM 40 MG/1
40 TABLET, COATED ORAL DAILY
Qty: 90 TABLET | Refills: 3 | Status: SHIPPED | OUTPATIENT
Start: 2024-03-25

## 2024-04-04 DIAGNOSIS — Z94.1 TRANSPLANTED HEART (H): ICD-10-CM

## 2024-04-07 ENCOUNTER — TELEPHONE (OUTPATIENT)
Dept: INTERNAL MEDICINE | Facility: CLINIC | Age: 59
End: 2024-04-07
Payer: COMMERCIAL

## 2024-04-07 DIAGNOSIS — J45.40 MODERATE PERSISTENT ASTHMA WITHOUT COMPLICATION: Primary | ICD-10-CM

## 2024-04-08 DIAGNOSIS — Z94.1 TRANSPLANTED HEART (H): ICD-10-CM

## 2024-04-08 RX ORDER — LOSARTAN POTASSIUM 25 MG/1
75 TABLET ORAL DAILY
Qty: 270 TABLET | Refills: 3 | Status: SHIPPED | OUTPATIENT
Start: 2024-04-08

## 2024-04-08 RX ORDER — LOSARTAN POTASSIUM 25 MG/1
75 TABLET ORAL DAILY
Qty: 270 TABLET | Refills: 3 | Status: CANCELLED | OUTPATIENT
Start: 2024-04-08

## 2024-04-11 NOTE — TELEPHONE ENCOUNTER
losartan (COZAAR) 25 MG tablet 270 tablet 3 4/8/2024     Addressed in a different encounter.   REFILLED 4/8/24    Deanna Mi RN  P Red Flag Triage/MRT

## 2024-04-12 ENCOUNTER — TELEPHONE (OUTPATIENT)
Dept: INTERNAL MEDICINE | Facility: CLINIC | Age: 59
End: 2024-04-12
Payer: COMMERCIAL

## 2024-04-12 NOTE — TELEPHONE ENCOUNTER
Alternative Medication requested    Medication: fluticasone (FLOVENT HFA) 220 MCG/ACT inhaler     Pharmacy: [id number] [address] [facility] [id number]    Insurance: Primary:[employer]   Secondary: [employer]     Alternate request: Not on formulary. Please send alternate medication

## 2024-04-18 ENCOUNTER — HOSPITAL ENCOUNTER (OUTPATIENT)
Dept: MRI IMAGING | Facility: CLINIC | Age: 59
Discharge: HOME | End: 2024-04-18
Attending: STUDENT IN AN ORGANIZED HEALTH CARE EDUCATION/TRAINING PROGRAM | Admitting: STUDENT IN AN ORGANIZED HEALTH CARE EDUCATION/TRAINING PROGRAM
Payer: COMMERCIAL

## 2024-04-18 VITALS
SYSTOLIC BLOOD PRESSURE: 151 MMHG | HEART RATE: 95 BPM | OXYGEN SATURATION: 97 % | RESPIRATION RATE: 20 BRPM | DIASTOLIC BLOOD PRESSURE: 93 MMHG

## 2024-04-18 DIAGNOSIS — Z94.1 TRANSPLANTED HEART (H): ICD-10-CM

## 2024-04-18 PROCEDURE — 75563 CARD MRI W/STRESS IMG & DYE: CPT | Mod: 26 | Performed by: STUDENT IN AN ORGANIZED HEALTH CARE EDUCATION/TRAINING PROGRAM

## 2024-04-18 PROCEDURE — 75563 CARD MRI W/STRESS IMG & DYE: CPT

## 2024-04-18 PROCEDURE — 250N000011 HC RX IP 250 OP 636: Performed by: STUDENT IN AN ORGANIZED HEALTH CARE EDUCATION/TRAINING PROGRAM

## 2024-04-18 PROCEDURE — 255N000002 HC RX 255 OP 636: Performed by: STUDENT IN AN ORGANIZED HEALTH CARE EDUCATION/TRAINING PROGRAM

## 2024-04-18 PROCEDURE — A9585 GADOBUTROL INJECTION: HCPCS | Performed by: STUDENT IN AN ORGANIZED HEALTH CARE EDUCATION/TRAINING PROGRAM

## 2024-04-18 PROCEDURE — 93016 CV STRESS TEST SUPVJ ONLY: CPT | Performed by: STUDENT IN AN ORGANIZED HEALTH CARE EDUCATION/TRAINING PROGRAM

## 2024-04-18 PROCEDURE — 93018 CV STRESS TEST I&R ONLY: CPT | Performed by: STUDENT IN AN ORGANIZED HEALTH CARE EDUCATION/TRAINING PROGRAM

## 2024-04-18 PROCEDURE — 93005 ELECTROCARDIOGRAM TRACING: CPT

## 2024-04-18 RX ORDER — DIPHENHYDRAMINE HCL 25 MG
25 CAPSULE ORAL
Status: DISCONTINUED | OUTPATIENT
Start: 2024-04-18 | End: 2024-04-19 | Stop reason: HOSPADM

## 2024-04-18 RX ORDER — GADOBUTROL 604.72 MG/ML
20 INJECTION INTRAVENOUS ONCE
Status: COMPLETED | OUTPATIENT
Start: 2024-04-18 | End: 2024-04-18

## 2024-04-18 RX ORDER — REGADENOSON 0.08 MG/ML
0.4 INJECTION, SOLUTION INTRAVENOUS ONCE
Status: COMPLETED | OUTPATIENT
Start: 2024-04-18 | End: 2024-04-18

## 2024-04-18 RX ORDER — DIPHENHYDRAMINE HYDROCHLORIDE 50 MG/ML
25-50 INJECTION INTRAMUSCULAR; INTRAVENOUS
Status: DISCONTINUED | OUTPATIENT
Start: 2024-04-18 | End: 2024-04-19 | Stop reason: HOSPADM

## 2024-04-18 RX ORDER — AMINOPHYLLINE 25 MG/ML
100 INJECTION, SOLUTION INTRAVENOUS ONCE
Status: COMPLETED | OUTPATIENT
Start: 2024-04-18 | End: 2024-04-18

## 2024-04-18 RX ORDER — ACYCLOVIR 200 MG/1
0-1 CAPSULE ORAL
Status: DISCONTINUED | OUTPATIENT
Start: 2024-04-18 | End: 2024-04-19 | Stop reason: HOSPADM

## 2024-04-18 RX ORDER — CAFFEINE CITRATE 20 MG/ML
60 SOLUTION INTRAVENOUS
Status: DISCONTINUED | OUTPATIENT
Start: 2024-04-18 | End: 2024-04-19 | Stop reason: HOSPADM

## 2024-04-18 RX ORDER — ONDANSETRON 2 MG/ML
4 INJECTION INTRAMUSCULAR; INTRAVENOUS
Status: DISCONTINUED | OUTPATIENT
Start: 2024-04-18 | End: 2024-04-19 | Stop reason: HOSPADM

## 2024-04-18 RX ORDER — ALBUTEROL SULFATE 90 UG/1
2 AEROSOL, METERED RESPIRATORY (INHALATION) EVERY 5 MIN PRN
Status: DISCONTINUED | OUTPATIENT
Start: 2024-04-18 | End: 2024-04-19 | Stop reason: HOSPADM

## 2024-04-18 RX ORDER — METHYLPREDNISOLONE SODIUM SUCCINATE 125 MG/2ML
125 INJECTION, POWDER, LYOPHILIZED, FOR SOLUTION INTRAMUSCULAR; INTRAVENOUS
Status: DISCONTINUED | OUTPATIENT
Start: 2024-04-18 | End: 2024-04-19 | Stop reason: HOSPADM

## 2024-04-18 RX ADMIN — REGADENOSON 0.4 MG: 0.08 INJECTION, SOLUTION INTRAVENOUS at 10:40

## 2024-04-18 RX ADMIN — GADOBUTROL 20 ML: 604.72 INJECTION INTRAVENOUS at 11:05

## 2024-04-18 RX ADMIN — AMINOPHYLLINE 100 MG: 25 INJECTION, SOLUTION INTRAVENOUS at 10:43

## 2024-04-18 NOTE — PROGRESS NOTES
Patient presents for cardiac stress MRI and denies caffeine consumption in the past 12 hours.  Patient is educated on procedure for cardiac stress MRI including the medications that will be used and their possible side effects.  Patient has concerns over receiving IV contrast and is reassured that Gadolinium does not risk harm to kidneys. Lungs are clear bilaterally.  Patient tolerated the Lexiscan injection reporting feeling sick to his stomach which patient states has resolved by six  minutes post injection.  Aminophylline is given per protocol and patient is monitored x 10 mn, then is left under the care of MRI staff.

## 2024-04-19 LAB
ATRIAL RATE - MUSE: 95 BPM
ATRIAL RATE - MUSE: 95 BPM
DIASTOLIC BLOOD PRESSURE - MUSE: NORMAL MMHG
DIASTOLIC BLOOD PRESSURE - MUSE: NORMAL MMHG
INTERPRETATION ECG - MUSE: NORMAL
INTERPRETATION ECG - MUSE: NORMAL
P AXIS - MUSE: 51 DEGREES
P AXIS - MUSE: 60 DEGREES
PR INTERVAL - MUSE: 184 MS
PR INTERVAL - MUSE: 190 MS
QRS DURATION - MUSE: 142 MS
QRS DURATION - MUSE: 146 MS
QT - MUSE: 368 MS
QT - MUSE: 382 MS
QTC - MUSE: 462 MS
QTC - MUSE: 480 MS
R AXIS - MUSE: 21 DEGREES
R AXIS - MUSE: 6 DEGREES
SYSTOLIC BLOOD PRESSURE - MUSE: NORMAL MMHG
SYSTOLIC BLOOD PRESSURE - MUSE: NORMAL MMHG
T AXIS - MUSE: 64 DEGREES
T AXIS - MUSE: 69 DEGREES
VENTRICULAR RATE- MUSE: 95 BPM
VENTRICULAR RATE- MUSE: 95 BPM

## 2024-04-29 DIAGNOSIS — R05.2 SUBACUTE COUGH: ICD-10-CM

## 2024-04-29 DIAGNOSIS — J45.20 INTERMITTENT ASTHMA WITHOUT COMPLICATION, UNSPECIFIED ASTHMA SEVERITY: ICD-10-CM

## 2024-04-29 RX ORDER — FLUTICASONE PROPIONATE 220 UG/1
2 AEROSOL, METERED RESPIRATORY (INHALATION) 2 TIMES DAILY
Qty: 12 G | Refills: 3 | Status: SHIPPED | OUTPATIENT
Start: 2024-04-29 | End: 2024-09-09

## 2024-05-21 DIAGNOSIS — Z94.1 TRANSPLANTED HEART (H): ICD-10-CM

## 2024-05-21 RX ORDER — SIROLIMUS 1 MG/1
2 TABLET, FILM COATED ORAL DAILY
Qty: 180 TABLET | Refills: 3 | Status: SHIPPED | OUTPATIENT
Start: 2024-05-21 | End: 2024-10-01

## 2024-06-08 ENCOUNTER — HEALTH MAINTENANCE LETTER (OUTPATIENT)
Age: 59
End: 2024-06-08

## 2024-06-13 ENCOUNTER — TELEPHONE (OUTPATIENT)
Dept: TRANSPLANT | Facility: CLINIC | Age: 59
End: 2024-06-13
Payer: COMMERCIAL

## 2024-06-13 NOTE — TELEPHONE ENCOUNTER
Call returned to patient. Pt is scheduled for a Ballon Sinuplasty on 7/22/24.  Pt wants to know if he can come off asa 7 days prior. Also are preventative antibiotics necessary? Necessary to come off rapa. Message sent to Dr. Lindsay for review.     [facility] in [place] Dr. Lowe.

## 2024-06-18 ENCOUNTER — LAB (OUTPATIENT)
Dept: LAB | Facility: CLINIC | Age: 59
End: 2024-06-18
Payer: COMMERCIAL

## 2024-06-18 DIAGNOSIS — Z94.1 HEART REPLACED BY TRANSPLANT (H): ICD-10-CM

## 2024-06-18 LAB
BASOPHILS # BLD AUTO: 0 10E3/UL (ref 0–0.2)
BASOPHILS NFR BLD AUTO: 0 %
EOSINOPHIL # BLD AUTO: 0.1 10E3/UL (ref 0–0.7)
EOSINOPHIL NFR BLD AUTO: 4 %
ERYTHROCYTE [DISTWIDTH] IN BLOOD BY AUTOMATED COUNT: 14.7 % (ref 10–15)
HCT VFR BLD AUTO: 34.8 % (ref 40–53)
HGB BLD-MCNC: 11.8 G/DL (ref 13.3–17.7)
IMM GRANULOCYTES # BLD: 0 10E3/UL
IMM GRANULOCYTES NFR BLD: 0 %
LYMPHOCYTES # BLD AUTO: 1 10E3/UL (ref 0.8–5.3)
LYMPHOCYTES NFR BLD AUTO: 26 %
MCH RBC QN AUTO: 29.1 PG (ref 26.5–33)
MCHC RBC AUTO-ENTMCNC: 33.9 G/DL (ref 31.5–36.5)
MCV RBC AUTO: 86 FL (ref 78–100)
MONOCYTES # BLD AUTO: 0.4 10E3/UL (ref 0–1.3)
MONOCYTES NFR BLD AUTO: 11 %
NEUTROPHILS # BLD AUTO: 2.2 10E3/UL (ref 1.6–8.3)
NEUTROPHILS NFR BLD AUTO: 59 %
PLATELET # BLD AUTO: 151 10E3/UL (ref 150–450)
RBC # BLD AUTO: 4.06 10E6/UL (ref 4.4–5.9)
SIROLIMUS BLD-MCNC: 4.5 UG/L (ref 5–15)
TME LAST DOSE: ABNORMAL H
TME LAST DOSE: ABNORMAL H
WBC # BLD AUTO: 3.7 10E3/UL (ref 4–11)

## 2024-06-18 PROCEDURE — 80069 RENAL FUNCTION PANEL: CPT | Performed by: INTERNAL MEDICINE

## 2024-06-18 PROCEDURE — 80195 ASSAY OF SIROLIMUS: CPT

## 2024-06-18 PROCEDURE — 83540 ASSAY OF IRON: CPT | Performed by: INTERNAL MEDICINE

## 2024-06-18 PROCEDURE — 85025 COMPLETE CBC W/AUTO DIFF WBC: CPT

## 2024-06-18 PROCEDURE — 83970 ASSAY OF PARATHORMONE: CPT | Performed by: INTERNAL MEDICINE

## 2024-06-18 PROCEDURE — 83550 IRON BINDING TEST: CPT | Performed by: INTERNAL MEDICINE

## 2024-06-18 PROCEDURE — 82306 VITAMIN D 25 HYDROXY: CPT | Performed by: INTERNAL MEDICINE

## 2024-06-18 PROCEDURE — 84156 ASSAY OF PROTEIN URINE: CPT | Performed by: INTERNAL MEDICINE

## 2024-06-18 PROCEDURE — 82728 ASSAY OF FERRITIN: CPT | Performed by: INTERNAL MEDICINE

## 2024-06-26 DIAGNOSIS — J45.20 INTERMITTENT ASTHMA WITHOUT COMPLICATION, UNSPECIFIED ASTHMA SEVERITY: ICD-10-CM

## 2024-06-26 RX ORDER — ALBUTEROL SULFATE 90 UG/1
2 AEROSOL, METERED RESPIRATORY (INHALATION) EVERY 6 HOURS PRN
Qty: 18 G | Refills: 5 | Status: SHIPPED | OUTPATIENT
Start: 2024-06-26

## 2024-09-07 DIAGNOSIS — J45.20 INTERMITTENT ASTHMA WITHOUT COMPLICATION, UNSPECIFIED ASTHMA SEVERITY: ICD-10-CM

## 2024-09-07 DIAGNOSIS — R05.2 SUBACUTE COUGH: ICD-10-CM

## 2024-09-07 DIAGNOSIS — J45.40 MODERATE PERSISTENT ASTHMA WITHOUT COMPLICATION: ICD-10-CM

## 2024-09-09 RX ORDER — FLUTICASONE PROPIONATE 100 UG/1
1 POWDER, METERED RESPIRATORY (INHALATION) EVERY 12 HOURS
Qty: 60 EACH | Refills: 3 | Status: SHIPPED | OUTPATIENT
Start: 2024-09-09

## 2024-09-09 RX ORDER — FLUTICASONE PROPIONATE 220 UG/1
2 AEROSOL, METERED RESPIRATORY (INHALATION) 2 TIMES DAILY
Qty: 12 G | Refills: 3 | Status: SHIPPED | OUTPATIENT
Start: 2024-09-09

## 2024-09-14 DIAGNOSIS — J45.40 MODERATE PERSISTENT ASTHMA WITHOUT COMPLICATION: ICD-10-CM

## 2024-09-16 RX ORDER — MOMETASONE FUROATE 100 UG/1
2 AEROSOL RESPIRATORY (INHALATION) 2 TIMES DAILY
Qty: 13 G | Refills: 3 | Status: SHIPPED | OUTPATIENT
Start: 2024-09-16

## 2024-09-18 ENCOUNTER — TELEPHONE (OUTPATIENT)
Dept: TRANSPLANT | Facility: CLINIC | Age: 59
End: 2024-09-18
Payer: COMMERCIAL

## 2024-09-18 DIAGNOSIS — Z94.1 TRANSPLANTED HEART (H): Primary | ICD-10-CM

## 2024-09-18 NOTE — TELEPHONE ENCOUNTER
Temp 100.1 and 102.1    Covid negative.     Take tylenol.     Some diarrhea.     Dry cough.     Dull headache.     Chills.     Pt remains on rapa 4.5. goal 5-7.   Aza 75 mg daily.     Message sent to Dr. Garvey for review.

## 2024-09-18 NOTE — TELEPHONE ENCOUNTER
Patient Call: General  Route to LPN    Reason for call: Pt not feeling well  COVID negative- running a fever- 100. On his forehead and oral etherometer 102? Etherometer is old so not sure if accurate     Call back needed? Yes    Return Call Needed  Same as documented in contacts section  When to return call?: Same day: Route High Priority

## 2024-09-19 ENCOUNTER — LAB (OUTPATIENT)
Dept: LAB | Facility: HOSPITAL | Age: 59
End: 2024-09-19
Payer: COMMERCIAL

## 2024-09-19 DIAGNOSIS — Z94.1 TRANSPLANTED HEART (H): ICD-10-CM

## 2024-09-19 DIAGNOSIS — Z94.1 TRANSPLANTED HEART (H): Primary | ICD-10-CM

## 2024-09-19 PROCEDURE — 80195 ASSAY OF SIROLIMUS: CPT

## 2024-09-19 PROCEDURE — 36415 COLL VENOUS BLD VENIPUNCTURE: CPT

## 2024-09-19 PROCEDURE — 87040 BLOOD CULTURE FOR BACTERIA: CPT

## 2024-09-19 PROCEDURE — 87799 DETECT AGENT NOS DNA QUANT: CPT

## 2024-09-20 ENCOUNTER — APPOINTMENT (OUTPATIENT)
Dept: CT IMAGING | Facility: HOSPITAL | Age: 59
DRG: 871 | End: 2024-09-20
Attending: EMERGENCY MEDICINE
Payer: COMMERCIAL

## 2024-09-20 ENCOUNTER — APPOINTMENT (OUTPATIENT)
Dept: RADIOLOGY | Facility: HOSPITAL | Age: 59
DRG: 871 | End: 2024-09-20
Attending: EMERGENCY MEDICINE
Payer: COMMERCIAL

## 2024-09-20 ENCOUNTER — TELEPHONE (OUTPATIENT)
Dept: TRANSPLANT | Facility: CLINIC | Age: 59
End: 2024-09-20

## 2024-09-20 ENCOUNTER — HOSPITAL ENCOUNTER (INPATIENT)
Facility: HOSPITAL | Age: 59
LOS: 5 days | Discharge: HOME | DRG: 871 | End: 2024-09-25
Attending: EMERGENCY MEDICINE | Admitting: STUDENT IN AN ORGANIZED HEALTH CARE EDUCATION/TRAINING PROGRAM
Payer: COMMERCIAL

## 2024-09-20 DIAGNOSIS — A41.9 SEPSIS, DUE TO UNSPECIFIED ORGANISM, UNSPECIFIED WHETHER ACUTE ORGAN DYSFUNCTION PRESENT (H): ICD-10-CM

## 2024-09-20 DIAGNOSIS — R50.9 FEVER, UNSPECIFIED FEVER CAUSE: ICD-10-CM

## 2024-09-20 DIAGNOSIS — R00.0 TACHYCARDIA: ICD-10-CM

## 2024-09-20 DIAGNOSIS — R53.83 OTHER FATIGUE: ICD-10-CM

## 2024-09-20 DIAGNOSIS — J18.9 PNEUMONIA OF LEFT LOWER LOBE DUE TO INFECTIOUS ORGANISM: ICD-10-CM

## 2024-09-20 DIAGNOSIS — R06.09 DOE (DYSPNEA ON EXERTION): ICD-10-CM

## 2024-09-20 DIAGNOSIS — Z94.1 HISTORY OF HEART TRANSPLANT (H): ICD-10-CM

## 2024-09-20 DIAGNOSIS — Z94.1 TRANSPLANTED HEART (H): Primary | ICD-10-CM

## 2024-09-20 LAB
ADV 40+41 DNA STL QL NAA+NON-PROBE: NEGATIVE
ALBUMIN SERPL BCG-MCNC: 3.7 G/DL (ref 3.5–5.2)
ALBUMIN UR-MCNC: 600 MG/DL
ALP SERPL-CCNC: 130 U/L (ref 40–150)
ALT SERPL W P-5'-P-CCNC: 25 U/L (ref 0–70)
ANION GAP SERPL CALCULATED.3IONS-SCNC: 18 MMOL/L (ref 7–15)
APPEARANCE UR: CLEAR
AST SERPL W P-5'-P-CCNC: 24 U/L (ref 0–45)
ASTRO TYP 1-8 RNA STL QL NAA+NON-PROBE: NEGATIVE
BASE EXCESS BLDV CALC-SCNC: -6.2 MMOL/L (ref -3–3)
BASOPHILS # BLD AUTO: 0 10E3/UL (ref 0–0.2)
BASOPHILS NFR BLD AUTO: 0 %
BILIRUB DIRECT SERPL-MCNC: 0.3 MG/DL (ref 0–0.3)
BILIRUB SERPL-MCNC: 0.5 MG/DL
BILIRUB UR QL STRIP: NEGATIVE
BUN SERPL-MCNC: 27.2 MG/DL (ref 8–23)
C CAYETANENSIS DNA STL QL NAA+NON-PROBE: NEGATIVE
C DIFF TOX B STL QL: NEGATIVE
CALCIUM SERPL-MCNC: 8.8 MG/DL (ref 8.8–10.4)
CAMPYLOBACTER DNA SPEC NAA+PROBE: NEGATIVE
CELLULAR CAST: 3 /LPF
CHLORIDE SERPL-SCNC: 96 MMOL/L (ref 98–107)
CMV DNA SPEC NAA+PROBE-ACNC: NOT DETECTED IU/ML
COLOR UR AUTO: ABNORMAL
CREAT SERPL-MCNC: 2.58 MG/DL (ref 0.67–1.17)
CRP SERPL-MCNC: 218.3 MG/L
CRYPTOSP DNA STL QL NAA+NON-PROBE: NEGATIVE
E COLI O157 DNA STL QL NAA+NON-PROBE: NORMAL
E HISTOLYT DNA STL QL NAA+NON-PROBE: NEGATIVE
EAEC ASTA GENE ISLT QL NAA+PROBE: NEGATIVE
EBV DNA SERPL NAA+PROBE-ACNC: NOT DETECTED IU/ML
EC STX1+STX2 GENES STL QL NAA+NON-PROBE: NEGATIVE
EGFRCR SERPLBLD CKD-EPI 2021: 27 ML/MIN/1.73M2
EOSINOPHIL # BLD AUTO: 0 10E3/UL (ref 0–0.7)
EOSINOPHIL NFR BLD AUTO: 0 %
EPEC EAE GENE STL QL NAA+NON-PROBE: NEGATIVE
ERYTHROCYTE [DISTWIDTH] IN BLOOD BY AUTOMATED COUNT: 13.9 % (ref 10–15)
ETEC LTA+ST1A+ST1B TOX ST NAA+NON-PROBE: NEGATIVE
FLUAV RNA SPEC QL NAA+PROBE: NEGATIVE
FLUBV RNA RESP QL NAA+PROBE: NEGATIVE
G LAMBLIA DNA STL QL NAA+NON-PROBE: NEGATIVE
GLUCOSE SERPL-MCNC: 134 MG/DL (ref 70–99)
GLUCOSE UR STRIP-MCNC: NEGATIVE MG/DL
GRANULAR CAST: 1 /LPF
HCO3 BLDV-SCNC: 17 MMOL/L (ref 21–28)
HCO3 SERPL-SCNC: 15 MMOL/L (ref 22–29)
HCT VFR BLD AUTO: 37.6 % (ref 40–53)
HGB BLD-MCNC: 12.7 G/DL (ref 13.3–17.7)
HGB UR QL STRIP: ABNORMAL
HOLD SPECIMEN: NORMAL
HOLD SPECIMEN: NORMAL
IMM GRANULOCYTES # BLD: 0 10E3/UL
IMM GRANULOCYTES NFR BLD: 0 %
KETONES UR STRIP-MCNC: 20 MG/DL
LACTATE SERPL-SCNC: 0.9 MMOL/L (ref 0.7–2)
LEUKOCYTE ESTERASE UR QL STRIP: NEGATIVE
LYMPHOCYTES # BLD AUTO: 0.5 10E3/UL (ref 0.8–5.3)
LYMPHOCYTES NFR BLD AUTO: 6 %
MAGNESIUM SERPL-MCNC: 1.7 MG/DL (ref 1.7–2.3)
MCH RBC QN AUTO: 27.7 PG (ref 26.5–33)
MCHC RBC AUTO-ENTMCNC: 33.8 G/DL (ref 31.5–36.5)
MCV RBC AUTO: 82 FL (ref 78–100)
MONOCYTES # BLD AUTO: 1 10E3/UL (ref 0–1.3)
MONOCYTES NFR BLD AUTO: 12 %
MUCOUS THREADS #/AREA URNS LPF: PRESENT /LPF
NEUTROPHILS # BLD AUTO: 6.3 10E3/UL (ref 1.6–8.3)
NEUTROPHILS NFR BLD AUTO: 81 %
NITRATE UR QL: NEGATIVE
NOROVIRUS GI+II RNA STL QL NAA+NON-PROBE: NEGATIVE
NRBC # BLD AUTO: 0 10E3/UL
NRBC BLD AUTO-RTO: 0 /100
NT-PROBNP SERPL-MCNC: 1445 PG/ML (ref 0–900)
O2/TOTAL GAS SETTING VFR VENT: 21 %
OXYHGB MFR BLDV: 86 % (ref 70–75)
P SHIGELLOIDES DNA STL QL NAA+NON-PROBE: NEGATIVE
PCO2 BLDV: 25 MM HG (ref 40–50)
PH BLDV: 7.43 [PH] (ref 7.32–7.43)
PH UR STRIP: 6.5 [PH] (ref 5–7)
PLATELET # BLD AUTO: 167 10E3/UL (ref 150–450)
PO2 BLDV: 50 MM HG (ref 25–47)
POTASSIUM SERPL-SCNC: 3.8 MMOL/L (ref 3.4–5.3)
PROCALCITONIN SERPL IA-MCNC: 0.64 NG/ML
PROT SERPL-MCNC: 7.6 G/DL (ref 6.4–8.3)
RBC # BLD AUTO: 4.58 10E6/UL (ref 4.4–5.9)
RBC URINE: 25 /HPF
RSV RNA SPEC NAA+PROBE: NEGATIVE
RVA RNA STL QL NAA+NON-PROBE: NEGATIVE
SALMONELLA SP RPOD STL QL NAA+PROBE: NEGATIVE
SAO2 % BLDV: 86.9 % (ref 70–75)
SAPO I+II+IV+V RNA STL QL NAA+NON-PROBE: NEGATIVE
SARS-COV-2 RNA RESP QL NAA+PROBE: NEGATIVE
SHIGELLA SP+EIEC IPAH ST NAA+NON-PROBE: NEGATIVE
SIROLIMUS BLD-MCNC: 7 UG/L (ref 5–15)
SODIUM SERPL-SCNC: 129 MMOL/L (ref 135–145)
SP GR UR STRIP: 1.02 (ref 1–1.03)
SQUAMOUS EPITHELIAL: <1 /HPF
TME LAST DOSE: NORMAL H
TME LAST DOSE: NORMAL H
TROPONIN T SERPL HS-MCNC: 12 NG/L
TROPONIN T SERPL HS-MCNC: 13 NG/L
UROBILINOGEN UR STRIP-MCNC: <2 MG/DL
V CHOLERAE DNA SPEC QL NAA+PROBE: NEGATIVE
VIBRIO DNA SPEC NAA+PROBE: NEGATIVE
WBC # BLD AUTO: 7.8 10E3/UL (ref 4–11)
WBC URINE: 1 /HPF
Y ENTEROCOL DNA STL QL NAA+PROBE: NEGATIVE

## 2024-09-20 PROCEDURE — 82805 BLOOD GASES W/O2 SATURATION: CPT | Performed by: FAMILY MEDICINE

## 2024-09-20 PROCEDURE — 80048 BASIC METABOLIC PNL TOTAL CA: CPT | Performed by: FAMILY MEDICINE

## 2024-09-20 PROCEDURE — 82310 ASSAY OF CALCIUM: CPT | Performed by: FAMILY MEDICINE

## 2024-09-20 PROCEDURE — 36415 COLL VENOUS BLD VENIPUNCTURE: CPT | Performed by: STUDENT IN AN ORGANIZED HEALTH CARE EDUCATION/TRAINING PROGRAM

## 2024-09-20 PROCEDURE — 82248 BILIRUBIN DIRECT: CPT | Performed by: STUDENT IN AN ORGANIZED HEALTH CARE EDUCATION/TRAINING PROGRAM

## 2024-09-20 PROCEDURE — 87507 IADNA-DNA/RNA PROBE TQ 12-25: CPT | Performed by: STUDENT IN AN ORGANIZED HEALTH CARE EDUCATION/TRAINING PROGRAM

## 2024-09-20 PROCEDURE — 96361 HYDRATE IV INFUSION ADD-ON: CPT

## 2024-09-20 PROCEDURE — 87637 SARSCOV2&INF A&B&RSV AMP PRB: CPT | Performed by: FAMILY MEDICINE

## 2024-09-20 PROCEDURE — 87206 SMEAR FLUORESCENT/ACID STAI: CPT | Performed by: STUDENT IN AN ORGANIZED HEALTH CARE EDUCATION/TRAINING PROGRAM

## 2024-09-20 PROCEDURE — 99418 PROLNG IP/OBS E/M EA 15 MIN: CPT | Performed by: STUDENT IN AN ORGANIZED HEALTH CARE EDUCATION/TRAINING PROGRAM

## 2024-09-20 PROCEDURE — 87207 SMEAR SPECIAL STAIN: CPT | Performed by: STUDENT IN AN ORGANIZED HEALTH CARE EDUCATION/TRAINING PROGRAM

## 2024-09-20 PROCEDURE — 250N000013 HC RX MED GY IP 250 OP 250 PS 637: Performed by: STUDENT IN AN ORGANIZED HEALTH CARE EDUCATION/TRAINING PROGRAM

## 2024-09-20 PROCEDURE — 258N000003 HC RX IP 258 OP 636: Performed by: STUDENT IN AN ORGANIZED HEALTH CARE EDUCATION/TRAINING PROGRAM

## 2024-09-20 PROCEDURE — 71045 X-RAY EXAM CHEST 1 VIEW: CPT

## 2024-09-20 PROCEDURE — 93005 ELECTROCARDIOGRAM TRACING: CPT | Performed by: EMERGENCY MEDICINE

## 2024-09-20 PROCEDURE — 250N000011 HC RX IP 250 OP 636: Performed by: STUDENT IN AN ORGANIZED HEALTH CARE EDUCATION/TRAINING PROGRAM

## 2024-09-20 PROCEDURE — 84484 ASSAY OF TROPONIN QUANT: CPT | Performed by: EMERGENCY MEDICINE

## 2024-09-20 PROCEDURE — 120N000004 HC R&B MS OVERFLOW

## 2024-09-20 PROCEDURE — 87449 NOS EACH ORGANISM AG IA: CPT | Performed by: STUDENT IN AN ORGANIZED HEALTH CARE EDUCATION/TRAINING PROGRAM

## 2024-09-20 PROCEDURE — 84145 PROCALCITONIN (PCT): CPT | Performed by: FAMILY MEDICINE

## 2024-09-20 PROCEDURE — 83605 ASSAY OF LACTIC ACID: CPT | Performed by: FAMILY MEDICINE

## 2024-09-20 PROCEDURE — 250N000013 HC RX MED GY IP 250 OP 250 PS 637: Performed by: EMERGENCY MEDICINE

## 2024-09-20 PROCEDURE — 87493 C DIFF AMPLIFIED PROBE: CPT | Performed by: STUDENT IN AN ORGANIZED HEALTH CARE EDUCATION/TRAINING PROGRAM

## 2024-09-20 PROCEDURE — 250N000011 HC RX IP 250 OP 636: Performed by: EMERGENCY MEDICINE

## 2024-09-20 PROCEDURE — 83735 ASSAY OF MAGNESIUM: CPT | Performed by: FAMILY MEDICINE

## 2024-09-20 PROCEDURE — 36415 COLL VENOUS BLD VENIPUNCTURE: CPT | Performed by: EMERGENCY MEDICINE

## 2024-09-20 PROCEDURE — 87040 BLOOD CULTURE FOR BACTERIA: CPT | Performed by: EMERGENCY MEDICINE

## 2024-09-20 PROCEDURE — 83880 ASSAY OF NATRIURETIC PEPTIDE: CPT | Performed by: EMERGENCY MEDICINE

## 2024-09-20 PROCEDURE — 36415 COLL VENOUS BLD VENIPUNCTURE: CPT | Performed by: FAMILY MEDICINE

## 2024-09-20 PROCEDURE — 71250 CT THORAX DX C-: CPT

## 2024-09-20 PROCEDURE — 81001 URINALYSIS AUTO W/SCOPE: CPT | Performed by: EMERGENCY MEDICINE

## 2024-09-20 PROCEDURE — 96365 THER/PROPH/DIAG IV INF INIT: CPT

## 2024-09-20 PROCEDURE — 86140 C-REACTIVE PROTEIN: CPT | Performed by: EMERGENCY MEDICINE

## 2024-09-20 PROCEDURE — 258N000003 HC RX IP 258 OP 636: Performed by: EMERGENCY MEDICINE

## 2024-09-20 PROCEDURE — 99223 1ST HOSP IP/OBS HIGH 75: CPT | Performed by: STUDENT IN AN ORGANIZED HEALTH CARE EDUCATION/TRAINING PROGRAM

## 2024-09-20 PROCEDURE — 85025 COMPLETE CBC W/AUTO DIFF WBC: CPT | Performed by: FAMILY MEDICINE

## 2024-09-20 PROCEDURE — 99291 CRITICAL CARE FIRST HOUR: CPT | Mod: 25

## 2024-09-20 RX ORDER — PIPERACILLIN SODIUM, TAZOBACTAM SODIUM 3; .375 G/15ML; G/15ML
3.38 INJECTION, POWDER, LYOPHILIZED, FOR SOLUTION INTRAVENOUS EVERY 8 HOURS
Status: DISCONTINUED | OUTPATIENT
Start: 2024-09-21 | End: 2024-09-24

## 2024-09-20 RX ORDER — ALBUTEROL SULFATE 90 UG/1
2 AEROSOL, METERED RESPIRATORY (INHALATION) EVERY 6 HOURS PRN
Status: DISCONTINUED | OUTPATIENT
Start: 2024-09-20 | End: 2024-09-25 | Stop reason: HOSPADM

## 2024-09-20 RX ORDER — CETIRIZINE HYDROCHLORIDE 10 MG/1
10 TABLET ORAL DAILY
Status: DISCONTINUED | OUTPATIENT
Start: 2024-09-21 | End: 2024-09-25 | Stop reason: HOSPADM

## 2024-09-20 RX ORDER — AMLODIPINE BESYLATE 5 MG/1
10 TABLET ORAL DAILY
Status: DISCONTINUED | OUTPATIENT
Start: 2024-09-21 | End: 2024-09-25 | Stop reason: HOSPADM

## 2024-09-20 RX ORDER — CALCIUM CARBONATE 500(1250)
1 TABLET ORAL DAILY
COMMUNITY

## 2024-09-20 RX ORDER — SIROLIMUS 1 MG/1
2 TABLET, FILM COATED ORAL DAILY
Status: DISCONTINUED | OUTPATIENT
Start: 2024-09-21 | End: 2024-09-21

## 2024-09-20 RX ORDER — ROSUVASTATIN CALCIUM 40 MG/1
40 TABLET, COATED ORAL DAILY
Status: DISCONTINUED | OUTPATIENT
Start: 2024-09-21 | End: 2024-09-25 | Stop reason: HOSPADM

## 2024-09-20 RX ORDER — LIDOCAINE 40 MG/G
CREAM TOPICAL
Status: DISCONTINUED | OUTPATIENT
Start: 2024-09-20 | End: 2024-09-25 | Stop reason: HOSPADM

## 2024-09-20 RX ORDER — ASPIRIN 81 MG/1
81 TABLET, CHEWABLE ORAL DAILY
Status: DISCONTINUED | OUTPATIENT
Start: 2024-09-21 | End: 2024-09-25 | Stop reason: HOSPADM

## 2024-09-20 RX ORDER — PIPERACILLIN SODIUM, TAZOBACTAM SODIUM 3; .375 G/15ML; G/15ML
3.38 INJECTION, POWDER, LYOPHILIZED, FOR SOLUTION INTRAVENOUS ONCE
Status: COMPLETED | OUTPATIENT
Start: 2024-09-20 | End: 2024-09-20

## 2024-09-20 RX ORDER — HYDRALAZINE HYDROCHLORIDE 10 MG/1
10 TABLET, FILM COATED ORAL EVERY 4 HOURS PRN
Status: DISCONTINUED | OUTPATIENT
Start: 2024-09-20 | End: 2024-09-25 | Stop reason: HOSPADM

## 2024-09-20 RX ORDER — AMOXICILLIN 250 MG
2 CAPSULE ORAL 2 TIMES DAILY PRN
Status: DISCONTINUED | OUTPATIENT
Start: 2024-09-20 | End: 2024-09-25 | Stop reason: HOSPADM

## 2024-09-20 RX ORDER — CEFAZOLIN SODIUM 1 G/50ML
20 SOLUTION INTRAVENOUS ONCE
Status: COMPLETED | OUTPATIENT
Start: 2024-09-20 | End: 2024-09-20

## 2024-09-20 RX ORDER — HYDRALAZINE HYDROCHLORIDE 20 MG/ML
10 INJECTION INTRAMUSCULAR; INTRAVENOUS EVERY 4 HOURS PRN
Status: DISCONTINUED | OUTPATIENT
Start: 2024-09-20 | End: 2024-09-25 | Stop reason: HOSPADM

## 2024-09-20 RX ORDER — POLYETHYLENE GLYCOL 3350 17 G/17G
17 POWDER, FOR SOLUTION ORAL 2 TIMES DAILY PRN
Status: DISCONTINUED | OUTPATIENT
Start: 2024-09-20 | End: 2024-09-25 | Stop reason: HOSPADM

## 2024-09-20 RX ORDER — FLUTICASONE PROPIONATE 100 UG/1
1 POWDER, METERED RESPIRATORY (INHALATION) EVERY 12 HOURS
Status: DISCONTINUED | OUTPATIENT
Start: 2024-09-20 | End: 2024-09-20

## 2024-09-20 RX ORDER — VANCOMYCIN HYDROCHLORIDE 1 G/200ML
1000 INJECTION, SOLUTION INTRAVENOUS EVERY 24 HOURS
Status: DISCONTINUED | OUTPATIENT
Start: 2024-09-21 | End: 2024-09-24

## 2024-09-20 RX ORDER — AMOXICILLIN 250 MG
1 CAPSULE ORAL 2 TIMES DAILY PRN
Status: DISCONTINUED | OUTPATIENT
Start: 2024-09-20 | End: 2024-09-25 | Stop reason: HOSPADM

## 2024-09-20 RX ORDER — SODIUM CHLORIDE 9 MG/ML
INJECTION, SOLUTION INTRAVENOUS CONTINUOUS
Status: DISCONTINUED | OUTPATIENT
Start: 2024-09-20 | End: 2024-09-22

## 2024-09-20 RX ORDER — ACETAMINOPHEN 325 MG/1
650 TABLET ORAL EVERY 4 HOURS PRN
Status: DISCONTINUED | OUTPATIENT
Start: 2024-09-20 | End: 2024-09-20

## 2024-09-20 RX ORDER — ONDANSETRON 4 MG/1
4 TABLET, ORALLY DISINTEGRATING ORAL EVERY 6 HOURS PRN
Status: DISCONTINUED | OUTPATIENT
Start: 2024-09-20 | End: 2024-09-25 | Stop reason: HOSPADM

## 2024-09-20 RX ORDER — DULOXETIN HYDROCHLORIDE 20 MG/1
20 CAPSULE, DELAYED RELEASE ORAL DAILY
Status: DISCONTINUED | OUTPATIENT
Start: 2024-09-21 | End: 2024-09-25 | Stop reason: HOSPADM

## 2024-09-20 RX ORDER — LANOLIN ALCOHOL/MO/W.PET/CERES
3 CREAM (GRAM) TOPICAL
COMMUNITY

## 2024-09-20 RX ORDER — LEVOTHYROXINE SODIUM 25 UG/1
75 TABLET ORAL
Status: DISCONTINUED | OUTPATIENT
Start: 2024-09-21 | End: 2024-09-25 | Stop reason: HOSPADM

## 2024-09-20 RX ORDER — PANTOPRAZOLE SODIUM 40 MG/1
40 TABLET, DELAYED RELEASE ORAL
Status: DISCONTINUED | OUTPATIENT
Start: 2024-09-21 | End: 2024-09-25 | Stop reason: HOSPADM

## 2024-09-20 RX ORDER — ONDANSETRON 2 MG/ML
4 INJECTION INTRAMUSCULAR; INTRAVENOUS EVERY 6 HOURS PRN
Status: DISCONTINUED | OUTPATIENT
Start: 2024-09-20 | End: 2024-09-25 | Stop reason: HOSPADM

## 2024-09-20 RX ORDER — ACETAMINOPHEN 325 MG/1
975 TABLET ORAL ONCE
Status: COMPLETED | OUTPATIENT
Start: 2024-09-20 | End: 2024-09-20

## 2024-09-20 RX ORDER — CALCIUM CARBONATE 500 MG/1
1000 TABLET, CHEWABLE ORAL 4 TIMES DAILY PRN
Status: DISCONTINUED | OUTPATIENT
Start: 2024-09-20 | End: 2024-09-25 | Stop reason: HOSPADM

## 2024-09-20 RX ORDER — ACETAMINOPHEN 650 MG/1
650 SUPPOSITORY RECTAL EVERY 4 HOURS PRN
Status: DISCONTINUED | OUTPATIENT
Start: 2024-09-20 | End: 2024-09-25 | Stop reason: HOSPADM

## 2024-09-20 RX ORDER — LIDOCAINE 40 MG/G
CREAM TOPICAL
Status: ACTIVE | OUTPATIENT
Start: 2024-09-20 | End: 2024-09-23

## 2024-09-20 RX ORDER — ACETAMINOPHEN 325 MG/1
650 TABLET ORAL EVERY 4 HOURS PRN
Status: DISCONTINUED | OUTPATIENT
Start: 2024-09-20 | End: 2024-09-25 | Stop reason: HOSPADM

## 2024-09-20 RX ORDER — AZATHIOPRINE 50 MG/1
50 TABLET ORAL DAILY
Status: DISCONTINUED | OUTPATIENT
Start: 2024-09-21 | End: 2024-09-25 | Stop reason: HOSPADM

## 2024-09-20 RX ADMIN — SODIUM CHLORIDE 500 ML: 9 INJECTION, SOLUTION INTRAVENOUS at 16:32

## 2024-09-20 RX ADMIN — SODIUM CHLORIDE 1750 MG: 9 INJECTION, SOLUTION INTRAVENOUS at 19:04

## 2024-09-20 RX ADMIN — PIPERACILLIN AND TAZOBACTAM 3.38 G: 3; .375 INJECTION, POWDER, FOR SOLUTION INTRAVENOUS at 17:48

## 2024-09-20 RX ADMIN — ACETAMINOPHEN 975 MG: 325 TABLET ORAL at 17:33

## 2024-09-20 RX ADMIN — MOMETASONE FUROATE 2 PUFF: 100 AEROSOL RESPIRATORY (INHALATION) at 21:44

## 2024-09-20 ASSESSMENT — ACTIVITIES OF DAILY LIVING (ADL)
ADLS_ACUITY_SCORE: 37
ADLS_ACUITY_SCORE: 35
ADLS_ACUITY_SCORE: 37

## 2024-09-20 ASSESSMENT — COLUMBIA-SUICIDE SEVERITY RATING SCALE - C-SSRS
6. HAVE YOU EVER DONE ANYTHING, STARTED TO DO ANYTHING, OR PREPARED TO DO ANYTHING TO END YOUR LIFE?: NO
2. HAVE YOU ACTUALLY HAD ANY THOUGHTS OF KILLING YOURSELF IN THE PAST MONTH?: NO
1. IN THE PAST MONTH, HAVE YOU WISHED YOU WERE DEAD OR WISHED YOU COULD GO TO SLEEP AND NOT WAKE UP?: NO

## 2024-09-20 NOTE — H&P
[facility]    History and Physical - Hospitalist Service       Date of Admission:  9/20/2024    Assessment & Plan      Everton Larios is a 59 year old male with a medical history of atrial fibrillation, ulcerative colitis, hypothyroidism, heart transplant and history of ASD s/p repair as a child who is admitted on 9/20/2024 with fever of unknown origin. CT chest pending.     Sepsis  Fever of unknown origin   -Given immunocompromised state and history of inflammatory issues differential for fever remains broad. If infectious, most likely source may be pulmonary (patient notes cough over the last few days) or GI (patient noting a week of loose bowel movements with possible melena and hematochezia). No robust s/s to suggest vasculitis or worsening inflammatory colitis. Doubtful PE although pt is tachycardic. No red flag s/s to suggest malignancy.   -meeting sepsis criteria with fever, tachycardia  -lactate normal at 0.9, procal mildly elevated and CRP very elevated   -UA not concerning for infection; noting ketones, proteinuria and blood  -CXR unrevealing and viral panel negative   -will check strep and legionella Ag  -will check fungitell serum   -follow-up CT scan of chest  -C-diff, enteric panel, cryptosporidium and microsporida pending  -blood cx pending   -continue empiric vanc and zosyn for now  -will add atypical coverage with azithromycin pending above results and micro data  -will obtain TTE, if issues noted will need transfer to [facility] per Dr. Lemos of transplant team     ?Melena and hematochezia  -patient states he has noticed dark stool and blood in stool over last few days, no abdominal pain  -GI consulted, question possible scope and bx to assess for ?CMV colitis     Hematuria  -UA as noted above. Will check microscopy for RBC casts  -urology consult     Anion gap acidosis  Hyponatremia  -likely 2/2 to CKD history and starvation ketosis as pt not eating much. Lactate normal. UA  with ketones   -continue fluids and encourage oral intake  -recheck with AM labs     A-fib s/p ablation  -mildly elevated BNP with normal troponin, not hypervolemic on exam  -holding aspirin    CKD  Chronic anemia   -creatinine a bit above baseline however not FRANKLIN criteria   -hgb stable  -IVF    Hx of heart transplant  Ulcerative colitis  -continue PTA sirolimus, dose reduce azathioprine to 50mg   -TTE as noted above  -GI consulted           Diet: Regular Diet Adult  DVT Prophylaxis: SCDs  Paul Catheter: Not present  Lines: None     Cardiac Monitoring: None  Code Status: Full Code    Clinically Significant Risk Factors Present on Admission         # Hyponatremia: Lowest Na = 129 mmol/L in last 2 days, will monitor as appropriate        # Drug Induced Platelet Defect: home medication list includes an antiplatelet medication   # Hypertension: Home medication list includes antihypertensive(s)             # Asthma: noted on problem list  # ICD device present       Disposition Plan     Medically Ready for Discharge: Anticipated in 2-4 Days           Ronn Morillo DO  Hospitalist Service  [facility]  Securely message with Hasht[employer] (more info)  Text page via [employer] Paging/Directory     ______________________________________________________________________    Chief Complaint   Fever and fatigue     History is obtained from the patient    History of Present Illness   Everton Larios is a 59 year old male with a medical history of atrial fibrillation, ulcerative colitis, hypothyroidism, heart transplant and history of ASD s/p repair as a child who is admitted on 9/20/2024 with fever of unknown origin.  Patient states that he initially noticed his heart racing on Monday.  This continued into Tuesday intermittently before he noticed subjective fevers before bed on Tuesday evening.  Since this time, patient continued to feel warm throughout the next day with progressive fatigue.  Patient states that he  then took his fever when he returned home from work and it was noted to be 102  F.  This continued into Thursday morning and progressed to the point where he noticed exertional shortness of breath.  Patient ultimately presented to the ER today due to unremitting symptoms and shortness of breath alongside fevers.    Patient is also admitting to nonproductive cough which started a few days ago.  Also states he has noticed about a week of loose bowel movements with dark stools and possible hematochezia.  He states that he noticed pain at the wrist joints several days ago but goes on to states he believes it was overuse of tendons that caused his pain.  Patient otherwise denies any swollen joints, new rash, petechial rash, rash on shins, abdominal pain, nausea, vomiting, conjunctivitis, oral ulcers, urinary symptoms, exposure to new medicines, exposure to stagnant water sources.  He states that a dog jumped up on him a few days ago and scratched his left arm, otherwise no significant animal exposure.  Denies any significant weight loss, night sweats.    Past Medical History    Past Medical History:   Diagnosis Date    Alcohol abuse     Arthritis     hands, neck    ASD (atrial septal defect)     s/p repair age 3    Asthma     Atrial fibrillation (H)     Pierce's esophagus 10/4/2018    Pierce's esophagus     Cataract     CHF (congestive heart failure) (H)     Chronic rhinitis 10/4/2018    Chronic systolic heart failure (H) 10/4/2018    Clotting disorder (H24)     Congenital cardiomyopathy in  (H)     Depression 10/4/2018    Depression     Diastolic dysfunction     DJD (degenerative joint disease)     neck    DJD (degenerative joint disease) - neck 10/4/2018    H/O congenital atrial septal defect (ASD) repair at age 3 10/4/2018    History of anesthesia complications     nausea    History of transfusion     Hypothyroidism     Hypothyroidism due to medication 10/4/2018    ICD (implantable cardioverter-defibrillator)  in place     ICD, [employer] 2008; gen change 2/2018 10/4/2018    Intermittent asthma without complication 10/4/2018    Nonischemic cardiomyopathy (H) 10/4/2018    On amiodarone therapy 10/4/2018    Pacemaker     Paroxysmal atrial fibrillation (H) 10/4/2018    S/P ablation of atrial fibrillation 10/4/2018    Systolic heart failure (H)     Ulcerative colitis (H) 2005    Ulcerative colitis (H)     Ventricular tachycardia (H) 10/4/2018    Ventricular tachycardia (H)        Past Surgical History   Past Surgical History:   Procedure Laterality Date    ABLATION OF DYSRHYTHMIC FOCUS      for A fib    AICD, DUAL CHAMBER      ASD REPAIR  01/01/1968    BRONCHOSCOPY (RIGID OR FLEXIBLE), DIAGNOSTIC N/A 04/30/2019    Procedure: BRONCHOSCOPY, WITH BAL;  Surgeon: Margot Chiang MD;  Location:  GI    CARDIAC DEFIBRILLATOR PLACEMENT      x2--last was Feb 2018    COLONOSCOPY N/A 02/20/2020    Procedure: COLONOSCOPY, WITH POLYPECTOMY AND BIOPSY;  Surgeon: Ranjeet Cooper MD;  Location:  GI    COLONOSCOPY N/A 02/05/2015    Procedure: COLONOSCOPY with biopsy;  Surgeon: Fernie Akers MD;  Location: [facility];  Service:     COLONOSCOPY N/A 12/19/2017    Procedure: COLONOSCOPY with biopsies and polypectomies using snare;  Surgeon: Gael Romero MD;  Location: [facility];  Service:     COLONOSCOPY N/A 3/8/2022    Procedure: COLONOSCOPY, WITH POLYPECTOMY AND BIOPSY;  Surgeon: Paddy Saldivar DO;  Location:  GI    CV CORONARY ANGIOGRAM N/A 02/12/2020    Procedure: CV CORONARY ANGIOGRAM;  Surgeon: Isiah Mcallister MD;  Location:  HEART CARDIAC CATH LAB    CV CORONARY ANGIOGRAM N/A 03/17/2021    Procedure: CV CORONARY ANGIOGRAM;  Surgeon: Santino Mckeon MD;  Location:  HEART CARDIAC CATH LAB    CV CORONARY ANGIOGRAM N/A 2/23/2022    Procedure: CV CORONARY ANGIOGRAM;  Surgeon: Yves Rodrigues MD;  Location: [facility] CARDIAC CATH LAB    CV CORONARY ANGIOGRAM N/A 2/23/2023    Procedure:  Coronary Angiogram;  Surgeon: Santino Mckeon MD;  Location: [facility] CARD[facility]AC CATH [facility]    CV HEART BIOPSY N/A 03/04/2019    Procedure: Heart Biopsy;  Surgeon: Abhijeet Titus MD;  Location: [facility]ARD[facility]    CV HEART BIOPSY N/A 03/25/2019    Procedure: Heart Biopsy;  Surgeon: Yves Rodrigues MD;  Location: [facility]    CV HEART BIOPSY N/A 03/28/2019    Procedure: Heart Cath Heart Biopsy;  Surgeon: Yves Rodrigues MD;  Location: [facility]    CV HEART BIOPSY N/A 04/10/2019    Procedure: CV HEART BIOPSY;  Surgeon: Isiah Mcallister MD;  [facility]    CV HEART BIOPSY N/A 04/24/2019    Procedure: CV HEART BIOPSY;  Surgeon: Isiah Mcallister MD;  [facility]    CV HEART BIOPSY N/A 05/08/2019    Procedure: CV HEART BIOPSY;  Surgeon: Isiah Mcallister MD;  Location: [facility]    CV HEART BIOPSY N/A 06/04/2019    Procedure: CV HEART BIOPSY;  Surgeon: Alton Solomon MD;  Location: [facility]    CV HEART BIOPSY N/A 05/22/2019    Procedure: CV HEART BIOPSY;  Surgeon: Yves Rodrigues MD;  [facility]: [facility]    CV HEART BIOPSY N/A 07/17/2019    Procedure: CV HEART BIOPSY;  Surgeon: Isiah Mcallister MD;  [facility]: [facility]    CV HEART BIOPSY N/A 08/13/2019    Procedure: CV HEART BIOPSY;  Surgeon: Jackson Patten MD;  [facility]: [facility]    CV HEART BIOPSY N/A 10/15/2019    Procedure: CV HEART BIOPSY;  Surgeon: Santino Mckeon MD;  [facility]: [facility]    CV HEART BIOPSY N/A 02/12/2020    Procedure: CV HEART BIOPSY;  Surgeon: Isiah Mcallister MD;  [facility]    CV HEART BIOPSY N/A 05/05/2020    Procedure: CV HEART BIOPSY;  Surgeon: Yves Rodrigues MD;  Location: [facility]    CV HEART BIOPSY N/A 03/17/2021    Procedure: CV HEART BIOPSY;  Surgeon:  Santino Mckeon MD;  Location:  HEART CARDIAC CATH LAB    CV HEART BIOPSY N/A 5/10/2022    Procedure: Heart Biopsy;  Surgeon: Yves Rodrigues MD;  Location:  HEART CARDIAC CATH LAB    CV INTRA AORTIC BALLOON N/A 02/18/2019    Procedure: Intra-Aortic Balloon;  Surgeon: Alton Solomon MD;  Location:  HEART CARDIAC CATH LAB    CV INTRA AORTIC BALLOON N/A 02/20/2019    Procedure: Replace subclavian IABP;  Surgeon: Yves Rodrigues MD;  Location:  HEART CARDIAC CATH LAB    CV INTRAVASULAR ULTRASOUND N/A 02/12/2020    Procedure: CV INTRAVASCULAR ULTRASOUND;  Surgeon: Isiah Mcallister MD;  Location:  HEART CARDIAC CATH LAB    CV LEFT HEART CATH N/A 03/19/2019    Procedure: Left Heart Cath;  Surgeon: Yves Rodrigues MD;  Location:  HEART CARDIAC CATH LAB    CV LEFT HEART CATH N/A 02/12/2020    Procedure: Left Heart Cath;  Surgeon: Isiah Mcallister MD;  Location:  HEART CARDIAC CATH LAB    CV MYOCARDIAL BIOPSY N/A 03/19/2019    Procedure: RHC/HBx - Femoral access for 3/19 per Nimisha GONZALEZ;  Surgeon: Yves Rodrigues MD;  Location:  HEART CARDIAC CATH LAB    CV MYOCARDIAL BIOPSY N/A 03/11/2019    Procedure: ADD ON RHC/HBX;  Surgeon: Alton Solomon MD;  Location:  HEART CARDIAC CATH LAB    CV RIGHT HEART CATH MEASUREMENTS RECORDED N/A 03/25/2019    Procedure: Right Heart Cath;  Surgeon: Yves Rodrigues MD;  Location:  HEART CARDIAC CATH LAB    CV RIGHT HEART CATH MEASUREMENTS RECORDED N/A 03/28/2019    Procedure: Heart Cath Right Heart Cath;  Surgeon: Yves Rodrigues MD;  Location:  HEART CARDIAC CATH LAB    CV RIGHT HEART CATH MEASUREMENTS RECORDED N/A 04/24/2019    Procedure: CV RIGHT HEART CATH;  Surgeon: Isiah Mcallister MD;  Location:  HEART CARDIAC CATH LAB    CV RIGHT HEART CATH MEASUREMENTS RECORDED N/A 06/04/2019    Procedure: CV RIGHT HEART CATH;  Surgeon: Alton Solomon MD;  Location:  HEART CARDIAC CATH LAB    CV RIGHT HEART CATH  MEASUREMENTS RECORDED N/A 05/22/2019    Procedure: CV RIGHT HEART CATH;  Surgeon: Yves Rodrigues MD;  Location:  HEART CARDIAC CATH LAB    CV RIGHT HEART CATH MEASUREMENTS RECORDED N/A 08/13/2019    Procedure: CV RIGHT HEART CATH;  Surgeon: Jackson Patten MD;  Location:  HEART CARDIAC CATH LAB    CV RIGHT HEART CATH MEASUREMENTS RECORDED N/A 10/15/2019    Procedure: CV RIGHT HEART CATH;  Surgeon: Santino Mckeon MD;  Location:  HEART CARDIAC CATH LAB    CV RIGHT HEART CATH MEASUREMENTS RECORDED N/A 02/12/2020    Procedure: CV RIGHT HEART CATH;  Surgeon: Isiah Mcallister MD;  Location:  HEART CARDIAC CATH LAB    CV RIGHT HEART CATH MEASUREMENTS RECORDED N/A 03/17/2021    Procedure: CV RIGHT HEART CATH;  Surgeon: Santino Mckeon MD;  Location:  HEART CARDIAC CATH LAB    CV RIGHT HEART CATH MEASUREMENTS RECORDED N/A 2/23/2022    Procedure: CV RIGHT HEART CATH;  Surgeon: Yves Rodrigues MD;  Location:  HEART CARDIAC CATH LAB    CV RIGHT HEART CATH MEASUREMENTS RECORDED N/A 5/10/2022    Procedure: Right Heart Catheterization;  Surgeon: Yves Rodrigues MD;  Location:  HEART CARDIAC CATH LAB    CV RIGHT HEART CATH MEASUREMENTS RECORDED N/A 2/23/2023    Procedure: Right Heart Catheterization;  Surgeon: Santino Mckeon MD;  Location:  HEART CARDIAC CATH LAB    ESOPHAGOSCOPY, GASTROSCOPY, DUODENOSCOPY (EGD), COMBINED N/A 12/19/2017    Procedure: ESOPHAGOGASTRODUODENOSCOPY (EGD) with biopsies;  Surgeon: Gael Romero MD;  Location: [facility];  Service:     ESOPHAGOSCOPY, GASTROSCOPY, DUODENOSCOPY (EGD), COMBINED N/A 3/8/2022    Procedure: ESOPHAGOGASTRODUODENOSCOPY, WITH BIOPSY;  Surgeon: Paddy Saldivar DO;  Location: [facility]    H STATISTIC PICC LINE INSERTION >5YR, FAILED Bilateral 10/28/2021    L sided SVC    HAND SURGERY Left     HC REVISE MEDIAN N/CARPAL TUNNEL SURG  09/21/2016    Procedure: OPEN RIGHT CARPAL TUNNEL RELEASE CORTISONE INJECTION RIGHT THUMB;   Surgeon: Duong Santoyo MD;  Location: [facility] OR;  Service: Orthopedics    INCISION AND DRAINAGE CHEST WASHOUT, COMBINED N/A 03/21/2019    Procedure: Incision And Drainage; Evacuation Right Chest Wall Hematoma;  Surgeon: Dewayne House MD;  Location: [facility]    INSERT INTRAAORTIC BALLOON PUMP Left 02/19/2019    Procedure: Insert left  Subclavian Balloon Pump,  Removal Right femoral arterial balloom pump sheath;  Surgeon: Dewayne House MD;  [facility]: [facility]    INSERT INTRAAORTIC BALLOON PUMP Left 02/21/2019    Procedure: SUBCLAVIAN BALLOON PUMP PLACEMENT;  Surgeon: Ben White MD;  Location: [facility]    INSERT INTRACORONARY STENT      x2    IR PICC PLACEMENT > 5 YRS OF AGE  05/08/2019    TRANSPLANT HEART RECIPIENT N/A 02/24/2019    Procedure: TRANSPLANT HEART RECIPIENT;  Surgeon: Dewayne House MD;  Location: [facility] OR       Prior to Admission Medications   Prior to Admission Medications   Prescriptions Last Dose Informant Patient Reported? Taking?   DULoxetine (CYMBALTA) 20 MG capsule   No No   Sig: Take 1 capsule (20 mg) by mouth daily   Fluticasone Propionate, Inhal, (FLUTICASONE PROPIONATE DISKUS) 100 MCG/ACT AEPB   No No   Sig: INHALE 1 PUFF INTO THE LUNGS EVERY 12 HOURS.   RESTASIS 0.05 % ophthalmic emulsion   Yes No   Sig: Place 1 drop into both eyes 2 times daily   acetaminophen (TYLENOL) 325 MG tablet  Self No No   Sig: Take 2 tablets (650 mg) by mouth every 4 hours as needed for mild pain   albuterol (PROAIR HFA/PROVENTIL HFA/VENTOLIN HFA) 108 (90 Base) MCG/ACT inhaler   No No   Sig: Inhale 2 puffs into the lungs every 6 hours as needed for shortness of breath or wheezing   amLODIPine (NORVASC) 5 MG tablet   No No   Sig: Take 2 tablets (10 mg) by mouth daily   aspirin (ASA) 81 MG chewable tablet  Self No No   Sig: Take 1 tablet (81 mg) by mouth daily   azaTHIOprine 75 MG TABS   No No   Sig: Take 75 mg by mouth daily   calcium carbonate 600 mg-vitamin D 400 units (CALTRATE)  600-400 MG-UNIT per tablet  Self No No   Sig: Take 1 tablet by mouth 2 times daily (with meals)   cetirizine (ZYRTEC) 10 MG tablet  Self Yes No   Sig: Take 10 mg by mouth daily   clindamycin (CLINDAMAX) 1 % external gel   No No   Sig: Apply twice daily as needed for acne on the spots   fluticasone (FLOVENT HFA) 220 MCG/ACT inhaler   No No   Sig: INHALE 2 PUFFS INTO THE LUNGS TWICE A DAY   levothyroxine (SYNTHROID/LEVOTHROID) 75 MCG tablet   No No   Sig: Take 1 tablet (75 mcg) by mouth daily   losartan (COZAAR) 25 MG tablet   No No   Sig: Take 3 tablets (75 mg) by mouth daily   melatonin 3 MG tablet   No No   Sig: Take 2 tablets (6 mg) by mouth At Bedtime   Patient taking differently: Take 3 mg by mouth nightly as needed   mometasone furoate (ASMANEX HFA) 100 MCG/ACT inhaler   No No   Sig: INHALE 2 PUFFS INTO THE LUNGS TWICE A DAY   multivitamin w/minerals (THERA-VIT-M) tablet  Self No No   Sig: Take 1 tablet by mouth daily   omeprazole (PRILOSEC) 40 MG DR capsule   No No   Sig: TAKE 1 CAPSULE BY MOUTH EVERY DAY   rosuvastatin (CRESTOR) 40 MG tablet   No No   Sig: Take 1 tablet (40 mg) by mouth daily   sildenafil (VIAGRA) 50 MG tablet   No No   Sig: Take 1 tablet (50 mg) by mouth daily as needed (Erectile dysfunction)   sirolimus (GENERIC EQUIVALENT) 1 MG tablet   No No   Sig: Take 2 tablets (2 mg) by mouth daily   tadalafil (CIALIS) 10 MG tablet   No No   Sig: Take 1 tablet (10 mg) by mouth daily as needed (Erectile dysfunction)      Facility-Administered Medications: None        Social History   I have reviewed this patient's social history and updated it with pertinent information if needed.  Social History     Tobacco Use    Smoking status: Former     Current packs/day: 0.00     Average packs/day: 1 pack/day for 28.8 years (28.8 ttl pk-yrs)     Types: Cigarettes     Start date: 1/1/1980     Quit date: 11/6/2008     Years since quitting: 15.8     Passive exposure: Past    Smokeless tobacco: Never   Vaping Use     Vaping status: Never Used   Substance Use Topics    Alcohol use: Yes     Alcohol/week: 1.0 - 2.0 standard drink of alcohol     Types: 1 - 2 Cans of beer per week    Drug use: No         Family History   I have reviewed this patient's family history and updated it with pertinent information if needed.  Family History   Problem Relation Age of Onset    Skin Cancer Mother     Endometrial Cancer Mother     Osteoporosis Mother     Hypertension Father     Endometrial Cancer Maternal Grandmother     Hip fracture No family hx of     Nephrolithiasis No family hx of          Allergies   Allergies   Allergen Reactions    Hydromorphone Other (See Comments)     Significant Delirium    Adhesive Tape Blisters     Tegaderm causes bruises, blisters and extreme irritation.    Lisinopril Other (See Comments)     hypotension    Quinolones Other (See Comments)     Would not rx quinolones until QTc interval improved.     Tobramycin Other (See Comments)     Would not use aminoglycosides as his kidney function is very borderline    Codeine Nausea        Physical Exam   Vital Signs: Temp: (!) 100.7  F (38.2  C) Temp src: Oral BP: (!) 145/74 Pulse: 120   Resp: 22 SpO2: 96 % O2 Device: None (Room air)    Weight: 191 lbs 0 oz    General Appearance: Mildly diaphoretic in no acute distress, no lymphadenopathy throughout neck or inguinal area  Respiratory: Decreased inspiratory effort without rales, rhonchi or wheezing, no dullness to percussion throughout  Cardiovascular: Tachycardic, pulses equal throughout all 4 extremities, no JVD, no lower extremity edema.  No Osler nodes or Janeway lesions  GI: Tense upper abdomen without pain on palpation, no rebound or guarding, no hepatosplenomegaly, no lymphadenopathy or rash on abdominal wall  Skin: Right forearm superficial scratch, no lymphadenopathy throughout, no other rash on skin exam, no swollen joints, fingernails appear clubbed without any ulcerations  Other: Alert and oriented, oral cavity  with dental caries and no open lesions or ulcerations    Medical Decision Making       95 MINUTES SPENT BY ME on the date of service doing chart review, history, exam, documentation & further activities per the note.      Data     I have personally reviewed the following data over the past 24 hrs:    7.8  \   12.7 (L)   / 167     129 (L) 96 (L) 27.2 (H) /  134 (H)   3.8 15 (L) 2.58 (H) \     ALT: 25 AST: 24 AP: 130 TBILI: 0.5   ALB: 3.7 TOT PROTEIN: 7.6 LIPASE: N/A     Trop: 12 BNP: 1,445 (H)     Procal: 0.64 (H) CRP: 218.30 (H) Lactic Acid: 0.9         Imaging results reviewed over the past 24 hrs:   Recent Results (from the past 24 hour(s))   XR Chest Port 1 View    Narrative    EXAM: XR CHEST PORT 1 VIEW  LOCATION: [facility]  DATE: 09/20/2024    INDICATION: Short of breath.  COMPARISON: None.      Impression    IMPRESSION: Sternotomy. Heart size within normal limits. Stable soft tissue prominence in the upper mediastinum. Old right rib fracture. Clips in the left axilla. No pulmonary infiltrates.

## 2024-09-20 NOTE — ED TRIAGE NOTES
He feels he has had a fever for the last few days. Also has SOB. He has chronic left elbow pain.          independent

## 2024-09-20 NOTE — ED PROVIDER NOTES
EMERGENCY DEPARTMENT ENCOUNTER      NAME: Everton Larios  AGE: 59 year old male  YOB: 1965  MRN: [id number]  EVALUATION DATE & TIME: 9/20/2024  3:48 PM    PCP: Fredrick Wolff    ED PROVIDER: Neva Shafer M.D.      Chief Complaint   Patient presents with    Fever    Shortness of Breath     FINAL IMPRESSION:  1. Transplanted heart (H)    2. Fever, unspecified fever cause    3. MATA (dyspnea on exertion)    4. Tachycardia    5. Other fatigue    6. History of heart transplant (H)    7. Pneumonia of left lower lobe due to infectious organism        ED COURSE & MEDICAL DECISION MAKING:    Pertinent Labs & Imaging studies reviewed. (See chart for details)  ED Course as of 09/20/24 2221   Fri Sep 20, 2024   1624 Patient is a pleasant 59-year-old male with a history of heart transplant a few years ago done at [facility] and comes in today for evaluation of shortness of breath with any exertion as well as a fever that is been on and off since Wednesday.  He is a poor appetite and extreme fatigue.  He has a dull headache and occasional cough but mostly is just not been eating well and feeling pretty rundown.  He did get scratched on his right arm from a dog couple days ago and did note that could be contributing but there is bruising to the area with no significant tenderness or erythema and I do not think it is infected.  He did not feel like he could drive to rush hour traffic to get to the [facility] today but did talk to his transplant coordinator.  He had some blood work yesterday including a blood culture that has come back negative so far.  Feels little bit off balance.  Has been drinking a lot of fluids.  He says his heart rates been up for couple days now when he is sitting in the 130s right now.  He is afebrile here but will check blood work and chest x-ray and EKG and see what can figure out for him.  I will check COVID, flu, RSV as well.  I discussed all this with the patient and he is in  agreement with the plan.   1722 Lactic acid looks okay.  White count is normal.  Creatinine is bumped a little bit at 2.58 with GFR of 27 which is little bit lower than the patient's normal GFR.  He does have an anion gap acidosis.  BNP is elevated at 1445 with a troponin of 13.  CRP is up as is procalcitonin.  COVID, flu, RSV are all negative.  Chest x-ray is negative for pneumonia.  I will put a call out to the transplant team to get further recommendations.   1739 I spoke with Dr. Lao with transplant at the .  He feels like coming in with broad-spectrum antibiotics including vancomycin and Zosyn and doing the full workup and getting an echocardiogram tomorrow is fine.  One of the biggest concerns is that infection makes them more susceptible ejection.  He said at the echocardiogram shows any change in decrease in function then patient would need to transfer over to the [facility].  I am going to discuss this with the patient and see if he wants to try to transfer to the [facility] now or stay here.   1743 Discussion with the patient on the findings and plan for admission to the hospital.  I talked about potential transfer to the [facility] and he did not have a strong preference at this time.  I think for now we can keep the patient here but if things change and he needs to go to the [facility] and then Dr. Lao is the on-call physician all weekend.  The patient admitted to the hospital here.   1835 With Dr. Fisher with the hospitalist service.  We talked about getting a noncontrast chest CT to see if there is a pneumonia that we are missing since the patient is febrile with some shortness of breath.  Patient is not a good candidate for contrast right now anyway so I have ordered that and then we will bring him in as a cardiac telemetry inpatient.   2220 CT scan showed a left lower lobe pneumonia which would make sense with his presentation.  He is already on appropriate antibiotics.       Medical Decision  Making    History:  Supplemental history from: None  External Record(s) reviewed: I reviewed the note and blood work that was obtained yesterday.  Patient had a blood culture so far come back with no growth.  Work Up:  Emergent/Severe conditions considered and evaluated for: COVID, flu, RSV, pneumonia, heart failure, myocarditis, sepsis, UTI, pneumothorax  I independently reviewed and interpreted chest x-ray which showed no pneumothorax.  See full radiology report for all details.  In additional to work up documented, I considered the following work up: None  Medications given that require intensive monitoring for toxicity: None    External consultation:  Discussion of management with another provider: Hospitalist    Complicating factors:  Care impacted by chronic illness: Atrial fibrillation, heart transplant at the [facility], hypertension, chronic kidney disease  Care affected by social determinants of health: Access to Care    Disposition considerations: Admission        At the conclusion of the encounter I discussed  the results of all of the tests and the disposition with patient.   All questions were answered.  The patient acknowledged understanding and was involved in the decision making regarding the overall care plan.      35 minutes of critical care time     MEDICATIONS GIVEN IN THE EMERGENCY:  Medications   lidocaine 1 % 0.1-1 mL (has no administration in time range)   lidocaine (LMX4) cream (has no administration in time range)   sodium chloride (PF) 0.9% PF flush 3 mL (3 mLs Intracatheter Not Given 9/20/24 2056)   sodium chloride (PF) 0.9% PF flush 3 mL (has no administration in time range)   senna-docusate (SENOKOT-S/PERICOLACE) 8.6-50 MG per tablet 1 tablet (has no administration in time range)     Or   senna-docusate (SENOKOT-S/PERICOLACE) 8.6-50 MG per tablet 2 tablet (has no administration in time range)   calcium carbonate (TUMS) chewable tablet 1,000 mg (has no administration in time  range)   acetaminophen (TYLENOL) tablet 650 mg (has no administration in time range)     Or   acetaminophen (TYLENOL) Suppository 650 mg (has no administration in time range)   hydrALAZINE (APRESOLINE) tablet 10 mg (has no administration in time range)     Or   hydrALAZINE (APRESOLINE) injection 10 mg (has no administration in time range)   melatonin tablet 5 mg (has no administration in time range)   polyethylene glycol (MIRALAX) Packet 17 g (has no administration in time range)   ondansetron (ZOFRAN ODT) ODT tab 4 mg (has no administration in time range)     Or   ondansetron (ZOFRAN) injection 4 mg (has no administration in time range)   sodium chloride 0.9 % infusion ( Intravenous $New Bag 9/20/24 7762)   albuterol (PROVENTIL HFA/VENTOLIN HFA) inhaler (has no administration in time range)   amLODIPine (NORVASC) tablet 10 mg (has no administration in time range)   aspirin (ASA) chewable tablet 81 mg ( Oral Automatically Held 9/24/24 0800)   azaTHIOprine (IMURAN) tablet 50 mg (has no administration in time range)   cetirizine (zyrTEC) tablet 10 mg (has no administration in time range)   DULoxetine (CYMBALTA) DR capsule 20 mg (has no administration in time range)   fluticasone (ARNUITY ELLIPTA) 200 MCG/ACT inhaler 1 puff (has no administration in time range)   levothyroxine (SYNTHROID/LEVOTHROID) tablet 75 mcg (has no administration in time range)   losartan (COZAAR) tablet 75 mg ( Oral Automatically Held 9/24/24 0800)   mometasone furoate (ASMANEX HFA) 100 MCG/ACT inhaler 2 puff (2 puffs Inhalation $Given 9/20/24 3474)   pantoprazole (PROTONIX) EC tablet 40 mg (has no administration in time range)   rosuvastatin (CRESTOR) tablet 40 mg (has no administration in time range)   sirolimus (GENERIC EQUIVALENT) tablet 2 mg (has no administration in time range)   piperacillin-tazobactam (ZOSYN) 3.375 g vial to attach to  mL bag (has no administration in time range)   azithromycin (ZITHROMAX) 500 mg in sodium chloride  0.9 % 250 mL intermittent infusion (500 mg Intravenous $New Bag 24 2151)   vancomycin (VANCOCIN) 1,000 mg in 200 mL dextrose intermittent infusion (has no administration in time range)   sodium chloride 0.9% BOLUS 500 mL (0 mLs Intravenous Stopped 24 1735)   acetaminophen (TYLENOL) tablet 975 mg (975 mg Oral $Given 24 1733)   piperacillin-tazobactam (ZOSYN) 3.375 g vial to attach to  mL bag (0 g Intravenous Stopped 24 1817)   vancomycin (VANCOCIN) 1,750 mg in sodium chloride 0.9 % 500 mL intermittent infusion (1,750 mg Intravenous $Given 24 1904)        =================================================================    HPI    Triage Note: He feels he has had a fever for the last few days. Also has SOB. He has chronic left elbow pain.     Patient information was obtained from: Patient    Use of : N/A       Everton Larios is a 59 year old male who presents for evaluation of fevers, elevated heart rate, extreme fatigue, and shortness of breath with exertion that is been going on for about 2 days now.    PAST MEDICAL HISTORY:  Past Medical History:   Diagnosis Date    Alcohol abuse     Arthritis     hands, neck    ASD (atrial septal defect)     s/p repair age 3    Asthma     Atrial fibrillation (H)     Pierce's esophagus 10/4/2018    Pierce's esophagus     Cataract     CHF (congestive heart failure) (H)     Chronic rhinitis 10/4/2018    Chronic systolic heart failure (H) 10/4/2018    Clotting disorder (H24)     Congenital cardiomyopathy in  (H)     Depression 10/4/2018    Depression     Diastolic dysfunction     DJD (degenerative joint disease)     neck    DJD (degenerative joint disease) - neck 10/4/2018    H/O congenital atrial septal defect (ASD) repair at age 3 10/4/2018    History of anesthesia complications     nausea    History of transfusion     Hypothyroidism     Hypothyroidism due to medication 10/4/2018    ICD (implantable cardioverter-defibrillator) in  place     ICD, [employer] 2008; gen change 2/2018 10/4/2018    Intermittent asthma without complication 10/4/2018    Nonischemic cardiomyopathy (H) 10/4/2018    On amiodarone therapy 10/4/2018    Pacemaker     Paroxysmal atrial fibrillation (H) 10/4/2018    S/P ablation of atrial fibrillation 10/4/2018    Systolic heart failure (H)     Ulcerative colitis (H) 2005    Ulcerative colitis (H)     Ventricular tachycardia (H) 10/4/2018    Ventricular tachycardia (H)        PAST SURGICAL HISTORY:  Past Surgical History:   Procedure Laterality Date    ABLATION OF DYSRHYTHMIC FOCUS      for A fib    AICD, DUAL CHAMBER      ASD REPAIR  01/01/1968    BRONCHOSCOPY (RIGID OR FLEXIBLE), DIAGNOSTIC N/A 04/30/2019    Procedure: BRONCHOSCOPY, WITH BAL;  Surgeon: Margot Chiang MD;  Location:  GI    CARDIAC DEFIBRILLATOR PLACEMENT      x2--last was Feb 2018    COLONOSCOPY N/A 02/20/2020    Procedure: COLONOSCOPY, WITH POLYPECTOMY AND BIOPSY;  Surgeon: Ranjeet Cooper MD;  Location:  GI    COLONOSCOPY N/A 02/05/2015    Procedure: COLONOSCOPY with biopsy;  Surgeon: Fernie Akers MD;  Location: [facility];  Service:     COLONOSCOPY N/A 12/19/2017    Procedure: COLONOSCOPY with biopsies and polypectomies using snare;  Surgeon: Gael Romero MD;  Location: [facility];  Service:     COLONOSCOPY N/A 3/8/2022    Procedure: COLONOSCOPY, WITH POLYPECTOMY AND BIOPSY;  Surgeon: Paddy Saldivar DO;  Location:  GI    CV CORONARY ANGIOGRAM N/A 02/12/2020    Procedure: CV CORONARY ANGIOGRAM;  Surgeon: Isiah Mcallister MD;  Location:  HEART CARDIAC CATH LAB    CV CORONARY ANGIOGRAM N/A 03/17/2021    Procedure: CV CORONARY ANGIOGRAM;  Surgeon: Santino Mckeon MD;  Location:  HEART CARDIAC CATH LAB    CV CORONARY ANGIOGRAM N/A 2/23/2022    Procedure: CV CORONARY ANGIOGRAM;  Surgeon: Yves Rodrigues MD;  Location: [facility] CARDIAC CATH LAB    CV CORONARY ANGIOGRAM N/A 2/23/2023    Procedure: Coronary  Angiogram;  Surgeon: Santino Mckeon MD;  [facility]: [facility] CATH [facility]    CV HEART BIOPSY N/A 03/04/2019    Procedure: Heart Biopsy;  Surgeon: Abhijeet Titus MD;  [facility]    CV HEART BIOPSY N/A 03/25/2019    Procedure: Heart Biopsy;  Surgeon: Yves Rodrigues MD;  [facility]: [facility]    CV HEART BIOPSY N/A 03/28/2019    Procedure: Heart Cath Heart Biopsy;  Surgeon: Yves Rodrigues MD;  [facility]    CV HEART BIOPSY N/A 04/10/2019    Procedure: CV HEART BIOPSY;  Surgeon: Isiah Mcallister MD;  [facility]    CV HEART BIOPSY N/A 04/24/2019    Procedure: CV HEART BIOPSY;  Surgeon: Isiah Mcallister MD;  [facility]    CV HEART BIOPSY N/A 05/08/2019    Procedure: CV HEART BIOPSY;  Surgeon: Isiah Mcallister MD;  [facility]: [facility]    CV HEART BIOPSY N/A 06/04/2019    Procedure: CV HEART BIOPSY;  Surgeon: Alton Solomon MD;  [facility]    CV HEART BIOPSY N/A 05/22/2019    Procedure: CV HEART BIOPSY;  Surgeon: Yves Rodrigues MD;  [facility]    CV HEART BIOPSY N/A 07/17/2019    Procedure: CV HEART BIOPSY;  Surgeon: Isiah Mcallister MD;  [facility]    CV HEART BIOPSY N/A 08/13/2019    Procedure: CV HEART BIOPSY;  Surgeon: Jackson Patten MD;  [facility]    CV HEART BIOPSY N/A 10/15/2019    Procedure: CV HEART BIOPSY;  Surgeon: Santino Mckeon MD;  [facility]: [facility]    CV HEART BIOPSY N/A 02/12/2020    Procedure: CV HEART BIOPSY;  Surgeon: Isiah Mcallister MD;  [facility]    CV HEART BIOPSY N/A 05/05/2020    Procedure: CV HEART BIOPSY;  Surgeon: Yves Rodrigues MD;  [facility]    CV HEART BIOPSY N/A 03/17/2021    Procedure: CV HEART BIOPSY;  Surgeon: Linda  Santino MCKEON MD;  Location:  HEART CARDIAC CATH LAB    CV HEART BIOPSY N/A 5/10/2022    Procedure: Heart Biopsy;  Surgeon: Yves Rodrigues MD;  Location:  HEART CARDIAC CATH LAB    CV INTRA AORTIC BALLOON N/A 02/18/2019    Procedure: Intra-Aortic Balloon;  Surgeon: Alton Solomon MD;  Location:  HEART CARDIAC CATH LAB    CV INTRA AORTIC BALLOON N/A 02/20/2019    Procedure: Replace subclavian IABP;  Surgeon: Yves Rodrigues MD;  Location:  HEART CARDIAC CATH LAB    CV INTRAVASULAR ULTRASOUND N/A 02/12/2020    Procedure: CV INTRAVASCULAR ULTRASOUND;  Surgeon: Isiah Mcallister MD;  Location:  HEART CARDIAC CATH LAB    CV LEFT HEART CATH N/A 03/19/2019    Procedure: Left Heart Cath;  Surgeon: Yves Rodrigues MD;  Location:  HEART CARDIAC CATH LAB    CV LEFT HEART CATH N/A 02/12/2020    Procedure: Left Heart Cath;  Surgeon: Isiah Mcallister MD;  Location:  HEART CARDIAC CATH LAB    CV MYOCARDIAL BIOPSY N/A 03/19/2019    Procedure: RHC/HBx - Femoral access for 3/19 per Nimisha GONZALEZ;  Surgeon: Yves Rodrigues MD;  Location:  HEART CARDIAC CATH LAB    CV MYOCARDIAL BIOPSY N/A 03/11/2019    Procedure: ADD ON RHC/HBX;  Surgeon: Alton Solomon MD;  Location:  HEART CARDIAC CATH LAB    CV RIGHT HEART CATH MEASUREMENTS RECORDED N/A 03/25/2019    Procedure: Right Heart Cath;  Surgeon: Yves Rodrigues MD;  Location:  HEART CARDIAC CATH LAB    CV RIGHT HEART CATH MEASUREMENTS RECORDED N/A 03/28/2019    Procedure: Heart Cath Right Heart Cath;  Surgeon: Yves Rodrigues MD;  Location:  HEART CARDIAC CATH LAB    CV RIGHT HEART CATH MEASUREMENTS RECORDED N/A 04/24/2019    Procedure: CV RIGHT HEART CATH;  Surgeon: Isiah Mcallister MD;  Location:  HEART CARDIAC CATH LAB    CV RIGHT HEART CATH MEASUREMENTS RECORDED N/A 06/04/2019    Procedure: CV RIGHT HEART CATH;  Surgeon: Alton Solomon MD;  Location:  HEART CARDIAC CATH LAB    CV RIGHT HEART CATH MEASUREMENTS  RECORDED N/A 05/22/2019    Procedure: CV RIGHT HEART CATH;  Surgeon: Yves Rodrigues MD;  Location:  HEART CARDIAC CATH LAB    CV RIGHT HEART CATH MEASUREMENTS RECORDED N/A 08/13/2019    Procedure: CV RIGHT HEART CATH;  Surgeon: Jackson Patten MD;  Location:  HEART CARDIAC CATH LAB    CV RIGHT HEART CATH MEASUREMENTS RECORDED N/A 10/15/2019    Procedure: CV RIGHT HEART CATH;  Surgeon: Santino Mckeon MD;  Location:  HEART CARDIAC CATH LAB    CV RIGHT HEART CATH MEASUREMENTS RECORDED N/A 02/12/2020    Procedure: CV RIGHT HEART CATH;  Surgeon: Isiah Mcallister MD;  Location:  HEART CARDIAC CATH LAB    CV RIGHT HEART CATH MEASUREMENTS RECORDED N/A 03/17/2021    Procedure: CV RIGHT HEART CATH;  Surgeon: Santino Mckeon MD;  Location:  HEART CARDIAC CATH LAB    CV RIGHT HEART CATH MEASUREMENTS RECORDED N/A 2/23/2022    Procedure: CV RIGHT HEART CATH;  Surgeon: Yves Rodrigues MD;  Location:  HEART CARDIAC CATH LAB    CV RIGHT HEART CATH MEASUREMENTS RECORDED N/A 5/10/2022    Procedure: Right Heart Catheterization;  Surgeon: Yves Rodrigues MD;  Location:  HEART CARDIAC CATH LAB    CV RIGHT HEART CATH MEASUREMENTS RECORDED N/A 2/23/2023    Procedure: Right Heart Catheterization;  Surgeon: Santino Mckeon MD;  Location:  HEART CARDIAC CATH LAB    ESOPHAGOSCOPY, GASTROSCOPY, DUODENOSCOPY (EGD), COMBINED N/A 12/19/2017    Procedure: ESOPHAGOGASTRODUODENOSCOPY (EGD) with biopsies;  Surgeon: Gael Romero MD;  Location: [facility];  Service:     ESOPHAGOSCOPY, GASTROSCOPY, DUODENOSCOPY (EGD), COMBINED N/A 3/8/2022    Procedure: ESOPHAGOGASTRODUODENOSCOPY, WITH BIOPSY;  Surgeon: Paddy Saldivar DO;  Location: [facility]    H STATISTIC PICC LINE INSERTION >5YR, FAILED Bilateral 10/28/2021    L sided SVC    HAND SURGERY Left     HC REVISE MEDIAN N/CARPAL TUNNEL SURG  09/21/2016    Procedure: OPEN RIGHT CARPAL TUNNEL RELEASE CORTISONE INJECTION RIGHT THUMB;  Surgeon: Duong ADAMS  MD Yarelis;  Location: [facility] OR;  Service: Orthopedics    INCISION AND DRAINAGE CHEST WASHOUT, COMBINED N/A 03/21/2019    Procedure: Incision And Drainage; Evacuation Right Chest Wall Hematoma;  Surgeon: Dewayne House MD;  [facility]: [facility]    INSERT INTRAAORTIC BALLOON PUMP Left 02/19/2019    Procedure: Insert left  Subclavian Balloon Pump,  Removal Right femoral arterial balloom pump sheath;  Surgeon: Dewayne House MD;  [facility]: [facility]    INSERT INTRAAORTIC BALLOON PUMP Left 02/21/2019    Procedure: SUBCLAVIAN BALLOON PUMP PLACEMENT;  Surgeon: Ben White MD;  [facility]: [facility]    INSERT INTRACORONARY STENT      x2    IR PICC PLACEMENT > 5 YRS OF AGE  05/08/2019    TRANSPLANT HEART RECIPIENT N/A 02/24/2019    Procedure: TRANSPLANT HEART RECIPIENT;  Surgeon: Dewayne House MD;  Location: [facility] OR       CURRENT MEDICATIONS:      Current Facility-Administered Medications:     acetaminophen (TYLENOL) tablet 650 mg, 650 mg, Oral, Q4H PRN **OR** acetaminophen (TYLENOL) Suppository 650 mg, 650 mg, Rectal, Q4H PRN, Morillo, Ronn, DO    albuterol (PROVENTIL HFA/VENTOLIN HFA) inhaler, 2 puff, Inhalation, Q6H PRN, Morillo, Ronn, DO    [START ON 9/21/2024] amLODIPine (NORVASC) tablet 10 mg, 10 mg, Oral, Daily, Morillo, Ronn, DO    [Held by provider] aspirin (ASA) chewable tablet 81 mg, 81 mg, Oral, Daily, Morillo, Ronn, DO    [START ON 9/21/2024] azaTHIOprine (IMURAN) tablet 50 mg, 50 mg, Oral, Daily, Morillo, Ronn, DO    azithromycin (ZITHROMAX) 500 mg in sodium chloride 0.9 % 250 mL intermittent infusion, 500 mg, Intravenous, Q24H, Morillo, Ronn, DO, 500 mg at 09/20/24 2151    calcium carbonate (TUMS) chewable tablet 1,000 mg, 1,000 mg, Oral, 4x Daily PRN, Morillo, Ronn, DO    [START ON 9/21/2024] cetirizine (zyrTEC) tablet 10 mg, 10 mg, Oral, Daily, Ronn Morillo DO    [START ON 9/21/2024] DULoxetine (CYMBALTA) DR capsule 20 mg, 20 mg, Oral, Daily, Ronn Morillo DO     [START ON 9/21/2024] fluticasone (ARNUITY ELLIPTA) 200 MCG/ACT inhaler 1 puff, 1 puff, Inhalation, Daily, Morillo, Ronn, DO    hydrALAZINE (APRESOLINE) tablet 10 mg, 10 mg, Oral, Q4H PRN **OR** hydrALAZINE (APRESOLINE) injection 10 mg, 10 mg, Intravenous, Q4H PRN, Morillo, Ronn, DO    [START ON 9/21/2024] levothyroxine (SYNTHROID/LEVOTHROID) tablet 75 mcg, 75 mcg, Oral, QAM AC, Morillo, Ronn, DO    lidocaine (LMX4) cream, , Topical, Q1H PRN, Morillo, Ronn, DO    lidocaine 1 % 0.1-1 mL, 0.1-1 mL, Other, Q1H PRN, Morillo, Ronn, DO    [Held by provider] losartan (COZAAR) tablet 75 mg, 75 mg, Oral, Daily, Morillo, Ronn, DO    melatonin tablet 5 mg, 5 mg, Oral, At Bedtime PRN, Morillo, Ronn, DO    mometasone furoate (ASMANEX HFA) 100 MCG/ACT inhaler 2 puff, 2 puff, Inhalation, BID, Morillo, Ronn, DO, 2 puff at 09/20/24 2144    ondansetron (ZOFRAN ODT) ODT tab 4 mg, 4 mg, Oral, Q6H PRN **OR** ondansetron (ZOFRAN) injection 4 mg, 4 mg, Intravenous, Q6H PRN, Morillo, Ronn, DO    [START ON 9/21/2024] pantoprazole (PROTONIX) EC tablet 40 mg, 40 mg, Oral, BID AC, Morillo, Ronn, DO    [START ON 9/21/2024] piperacillin-tazobactam (ZOSYN) 3.375 g vial to attach to  mL bag, 3.375 g, Intravenous, Q8H, Morillo, Ronn, DO    polyethylene glycol (MIRALAX) Packet 17 g, 17 g, Oral, BID PRN, Morillo, Ronn, DO    [START ON 9/21/2024] rosuvastatin (CRESTOR) tablet 40 mg, 40 mg, Oral, Daily, Morillo, Ronn, DO    senna-docusate (SENOKOT-S/PERICOLACE) 8.6-50 MG per tablet 1 tablet, 1 tablet, Oral, BID PRN **OR** senna-docusate (SENOKOT-S/PERICOLACE) 8.6-50 MG per tablet 2 tablet, 2 tablet, Oral, BID PRN, Morillo, Ronn, DO    [START ON 9/21/2024] sirolimus (GENERIC EQUIVALENT) tablet 2 mg, 2 mg, Oral, Daily, Morillo, Ronn, DO    sodium chloride (PF) 0.9% PF flush 3 mL, 3 mL, Intracatheter, Q8H, Morillo, Ronn, DO    sodium chloride (PF) 0.9% PF flush 3 mL, 3 mL, Intracatheter, q1 min prn, Morillo, Ronn,  DO    sodium chloride 0.9 % infusion, , Intravenous, Continuous, Morillo, Ronn, DO, Last Rate: 100 mL/hr at 09/20/24 2152, New Bag at 09/20/24 2152    [START ON 9/21/2024] vancomycin (VANCOCIN) 1,000 mg in 200 mL dextrose intermittent infusion, 1,000 mg, Intravenous, Q24H, Morillo, Ronn, DO    ALLERGIES:  Allergies   Allergen Reactions    Hydromorphone Other (See Comments)     Significant Delirium    Adhesive Tape Blisters     Tegaderm causes bruises, blisters and extreme irritation.    Lisinopril Other (See Comments)     hypotension    Quinolones Other (See Comments)     Would not rx quinolones until QTc interval improved.     Tobramycin Other (See Comments)     Would not use aminoglycosides as his kidney function is very borderline    Codeine Nausea       FAMILY HISTORY:  Family History   Problem Relation Age of Onset    Skin Cancer Mother     Endometrial Cancer Mother     Osteoporosis Mother     Hypertension Father     Endometrial Cancer Maternal Grandmother     Hip fracture No family hx of     Nephrolithiasis No family hx of        SOCIAL HISTORY:   Social History     Socioeconomic History    Marital status: Single   Tobacco Use    Smoking status: Former     Current packs/day: 0.00     Average packs/day: 1 pack/day for 28.8 years (28.8 ttl pk-yrs)     Types: Cigarettes     Start date: 1/1/1980     Quit date: 11/6/2008     Years since quitting: 15.8     Passive exposure: Past    Smokeless tobacco: Never   Vaping Use    Vaping status: Never Used   Substance and Sexual Activity    Alcohol use: Yes     Alcohol/week: 1.0 - 2.0 standard drink of alcohol     Types: 1 - 2 Cans of beer per week    Drug use: No   Social History Narrative    Everton is a retired . He lives by himself in a townhouse in [place]. He has no lawn care responsibilities. He will be staying with his folks during his post-hospital early transplant care time. No history of TB exposures.     He works part-time at [employer].     Social  "Determinants of Health     Financial Resource Strain: Low Risk  (11/8/2023)    Financial Resource Strain     Within the past 12 months, have you or your family members you live with been unable to get utilities (heat, electricity) when it was really needed?: No   Food Insecurity: Low Risk  (11/8/2023)    Food Insecurity     Within the past 12 months, did you worry that your food would run out before you got money to buy more?: No     Within the past 12 months, did the food you bought just not last and you didn t have money to get more?: No   Transportation Needs: Low Risk  (11/8/2023)    Transportation Needs     Within the past 12 months, has lack of transportation kept you from medical appointments, getting your medicines, non-medical meetings or appointments, work, or from getting things that you need?: No   Interpersonal Safety: Low Risk  (11/8/2023)    Interpersonal Safety     Do you feel physically and emotionally safe where you currently live?: Yes     Within the past 12 months, have you been hit, slapped, kicked or otherwise physically hurt by someone?: No     Within the past 12 months, have you been humiliated or emotionally abused in other ways by your partner or ex-partner?: No   Housing Stability: Low Risk  (11/8/2023)    Housing Stability     Do you have housing? : Yes     Are you worried about losing your housing?: No       PHYSICAL EXAM    VITAL SIGNS: BP (!) 142/79 (BP Location: Left arm)   Pulse 116   Temp 99.4  F (37.4  C) (Oral)   Resp 24   Ht 1.727 m (5' 8\")   Wt 86.1 kg (189 lb 14.4 oz)   SpO2 96%   BMI 28.87 kg/m     GENERAL: Awake, alert, answering questions appropriately  SPEECH:  Easy to understand speech, Normal volume and butch  PULMONARY: No respiratory distress, Lungs clear to auscultation bilaterally  CARDIOVASCULAR: Regular tachycardic rate and rhythm, Distal pulses present and normal.  ABDOMINAL: Soft, Nondistended, Nontender, No rebound or guarding, No palpable " masses  EXTREMITIES: No lower extremity edema.  PSYCH: Normal mood and affect     LAB:  All pertinent labs reviewed and interpreted.  Results for orders placed or performed during the hospital encounter of 09/20/24   XR Chest Port 1 View    Impression    IMPRESSION: Sternotomy. Heart size within normal limits. Stable soft tissue prominence in the upper mediastinum. Old right rib fracture. Clips in the left axilla. No pulmonary infiltrates.   Chest CT w/o contrast    Impression    IMPRESSION:     1.  Left lower lobe pneumonia.    2.  A 3 mm right upper lobe nodule is new since 10/28/2022. See follow-up guidelines below.     REFERENCE:  Guidelines for Management of Incidental Pulmonary Nodules Detected on CT Images: From the Fleischner Society 2017.   Guidelines apply to incidental nodules in patients who are 35 years or older.  Guidelines do not apply to lung cancer screening, patients with immunosuppression, or patients with known primary cancer.    SINGLE NODULE  Nodule size <6 mm  Low-risk patients: No follow-up needed.  High-risk patients: Optional follow-up at 12 months.   Basic metabolic panel   Result Value Ref Range    Sodium 129 (L) 135 - 145 mmol/L    Potassium 3.8 3.4 - 5.3 mmol/L    Chloride 96 (L) 98 - 107 mmol/L    Carbon Dioxide (CO2) 15 (L) 22 - 29 mmol/L    Anion Gap 18 (H) 7 - 15 mmol/L    Urea Nitrogen 27.2 (H) 8.0 - 23.0 mg/dL    Creatinine 2.58 (H) 0.67 - 1.17 mg/dL    GFR Estimate 27 (L) >60 mL/min/1.73m2    Calcium 8.8 8.8 - 10.4 mg/dL    Glucose 134 (H) 70 - 99 mg/dL   Result Value Ref Range    Procalcitonin 0.64 (H) <0.50 ng/mL   Result Value Ref Range    Magnesium 1.7 1.7 - 2.3 mg/dL   Lactic acid whole blood with 1x repeat in 2 hr when >2   Result Value Ref Range    Lactic Acid, Initial 0.9 0.7 - 2.0 mmol/L   Symptomatic Influenza A/B, RSV, & SARS-CoV2 PCR (COVID-19) Nasopharyngeal    Specimen: Nasopharyngeal; Swab   Result Value Ref Range    Influenza A PCR Negative Negative    Influenza B  PCR Negative Negative    RSV PCR Negative Negative    SARS CoV2 PCR Negative Negative   Blood gas venous   Result Value Ref Range    pH Venous 7.43 7.32 - 7.43    pCO2 Venous 25 (L) 40 - 50 mm Hg    pO2 Venous 50 (H) 25 - 47 mm Hg    Bicarbonate Venous 17 (L) 21 - 28 mmol/L    Base Excess/Deficit Venous -6.2 (L) -3.0 - 3.0 mmol/L    FIO2 21     Oxyhemoglobin Venous 86 (H) 70 - 75 %    O2 Sat, Venous 86.9 (H) 70.0 - 75.0 %   CBC with platelets and differential   Result Value Ref Range    WBC Count 7.8 4.0 - 11.0 10e3/uL    RBC Count 4.58 4.40 - 5.90 10e6/uL    Hemoglobin 12.7 (L) 13.3 - 17.7 g/dL    Hematocrit 37.6 (L) 40.0 - 53.0 %    MCV 82 78 - 100 fL    MCH 27.7 26.5 - 33.0 pg    MCHC 33.8 31.5 - 36.5 g/dL    RDW 13.9 10.0 - 15.0 %    Platelet Count 167 150 - 450 10e3/uL    % Neutrophils 81 %    % Lymphocytes 6 %    % Monocytes 12 %    % Eosinophils 0 %    % Basophils 0 %    % Immature Granulocytes 0 %    NRBCs per 100 WBC 0 <1 /100    Absolute Neutrophils 6.3 1.6 - 8.3 10e3/uL    Absolute Lymphocytes 0.5 (L) 0.8 - 5.3 10e3/uL    Absolute Monocytes 1.0 0.0 - 1.3 10e3/uL    Absolute Eosinophils 0.0 0.0 - 0.7 10e3/uL    Absolute Basophils 0.0 0.0 - 0.2 10e3/uL    Absolute Immature Granulocytes 0.0 <=0.4 10e3/uL    Absolute NRBCs 0.0 10e3/uL   Extra Blue Top Tube   Result Value Ref Range    Hold Specimen JIC    Extra Red Top Tube   Result Value Ref Range    Hold Specimen JIC    Nt probnp inpatient   Result Value Ref Range    N terminal Pro BNP Inpatient 1,445 (H) 0 - 900 pg/mL   Result Value Ref Range    CRP Inflammation 218.30 (H) <5.00 mg/L   UA with Microscopic reflex to Culture    Specimen: Urine, Clean Catch   Result Value Ref Range    Color Urine Light Yellow Colorless, Straw, Light Yellow, Yellow    Appearance Urine Clear Clear    Glucose Urine Negative Negative mg/dL    Bilirubin Urine Negative Negative    Ketones Urine 20 (A) Negative mg/dL    Specific Gravity Urine 1.017 1.001 - 1.030    Blood Urine >1.0  mg/dL (A) Negative    pH Urine 6.5 5.0 - 7.0    Protein Albumin Urine 600 (A) Negative mg/dL    Urobilinogen Urine <2.0 <2.0 mg/dL    Nitrite Urine Negative Negative    Leukocyte Esterase Urine Negative Negative    Mucus Urine Present (A) None Seen /LPF    RBC Urine 25 (H) <=2 /HPF    WBC Urine 1 <=5 /HPF    Squamous Epithelials Urine <1 <=1 /HPF    Cellular Casts Urine 3 (H) None Seen /LPF    Granular Casts Urine 1 (H) None Seen /LPF   Result Value Ref Range    Troponin T, High Sensitivity 13 <=22 ng/L   Result Value Ref Range    Troponin T, High Sensitivity 12 <=22 ng/L   Hepatic panel   Result Value Ref Range    Protein Total 7.6 6.4 - 8.3 g/dL    Albumin 3.7 3.5 - 5.2 g/dL    Bilirubin Total 0.5 <=1.2 mg/dL    Alkaline Phosphatase 130 40 - 150 U/L    AST 24 0 - 45 U/L    ALT 25 0 - 70 U/L    Bilirubin Direct 0.30 0.00 - 0.30 mg/dL   C. difficile Toxin B PCR with reflex to C. difficile Antigen and Toxins A/B EIA    Specimen: Per Rectum; Stool   Result Value Ref Range    C Difficile Toxin B by PCR Negative Negative       RADIOLOGY:  Chest CT w/o contrast   Final Result   IMPRESSION:       1.  Left lower lobe pneumonia.      2.  A 3 mm right upper lobe nodule is new since 10/28/2022. See follow-up guidelines below.       REFERENCE:   Guidelines for Management of Incidental Pulmonary Nodules Detected on CT Images: From the Fleischner Society 2017.    Guidelines apply to incidental nodules in patients who are 35 years or older.   Guidelines do not apply to lung cancer screening, patients with immunosuppression, or patients with known primary cancer.      SINGLE NODULE   Nodule size <6 mm   Low-risk patients: No follow-up needed.   High-risk patients: Optional follow-up at 12 months.      XR Chest Port 1 View   Final Result   IMPRESSION: Sternotomy. Heart size within normal limits. Stable soft tissue prominence in the upper mediastinum. Old right rib fracture. Clips in the left axilla. No pulmonary infiltrates.       Echocardiogram Complete    (Results Pending)         EKG:    Date and time: September 20, 2024 at 1553  Rate: 139 bpm  Rhythm: Sinus tachycardia  KS interval: 134 ms  QRS interval: 138 ms  QT/QTc: 290/441 ms  ST changes or T wave changes: No acute ST or T wave abnormalities  Change from prior ECG: Rate has increased from previous EKG.  I have independently reviewed and interpreted this EKG.     PROCEDURES:     Critical Care     Performed by: Dr Neva Shafer  Authorized by: Dr Neva Shafer  Total critical care time: 35 minutes  Critical care was necessary to treat or prevent imminent or life-threatening deterioration of the following conditions: Sepsis related to pneumonia with heart rate greater than 120 and two IV antibiotics.  Critical care was time spent personally by me on the following activities: development of treatment plan with patient or surrogate, discussions with consultants, examination of patient, evaluation of patient's response to treatment, obtaining history from patient or surrogate, ordering and performing treatments and interventions, ordering and review of laboratory studies, ordering and review of radiographic studies, re-evaluation of patient's condition and monitoring for potential decompensation.  Critical care time was exclusive of separately billable procedures and treating other patients.      Neva Shafer M.D.  Emergency Medicine  [facility] EMERGENCY DEPARTMENT  [facility] [facility] [address] [phone]  Dept: [phone]       Neva Shafer MD  09/20/24 1458

## 2024-09-20 NOTE — PHARMACY-VANCOMYCIN DOSING SERVICE
Pharmacy Vancomycin Initial Note  Date of Service 2024  Patient's  1965  59 year old, male    Indication: Sepsis    Current estimated CrCl = Estimated Creatinine Clearance: 31.8 mL/min (A) (based on SCr of 2.58 mg/dL (H)).    Creatinine for last 3 days  2024:  4:00 PM Creatinine 2.58 mg/dL    Recent Vancomycin Level(s) for last 3 days  No results found for requested labs within last 3 days.      Vancomycin IV Administrations (past 72 hours)        No vancomycin orders with administrations in past 72 hours.                    Nephrotoxins and other renal medications (From now, onward)      Start     Dose/Rate Route Frequency Ordered Stop    24 1800  piperacillin-tazobactam (ZOSYN) 3.375 g vial to attach to  mL bag         3.375 g  over 30 Minutes Intravenous ONCE 24 1738      24 1800  vancomycin (VANCOCIN) 1,750 mg in sodium chloride 0.9 % 500 mL intermittent infusion         20 mg/kg × 86.6 kg  over 2 Hours Intravenous ONCE 24 1744              Contrast Orders - past 72 hours (72h ago, onward)      None                Plan:  Start vancomycin 1750 mg (~20.2 mg/kg) IV x1 dose.  Please re-consult pharmacy for continued dosing inpatient.    Nicollette McMann, RP

## 2024-09-20 NOTE — TELEPHONE ENCOUNTER
Pt advised to decrease aza to 50 mg daily. Labs/cxr ordered. Pt verbalized understanding and will notify us if symptoms worsen.

## 2024-09-21 ENCOUNTER — APPOINTMENT (OUTPATIENT)
Dept: CARDIOLOGY | Facility: HOSPITAL | Age: 59
DRG: 871 | End: 2024-09-21
Attending: STUDENT IN AN ORGANIZED HEALTH CARE EDUCATION/TRAINING PROGRAM
Payer: COMMERCIAL

## 2024-09-21 ENCOUNTER — APPOINTMENT (OUTPATIENT)
Dept: CT IMAGING | Facility: HOSPITAL | Age: 59
DRG: 871 | End: 2024-09-21
Attending: UROLOGY
Payer: COMMERCIAL

## 2024-09-21 ENCOUNTER — APPOINTMENT (OUTPATIENT)
Dept: INTERVENTIONAL RADIOLOGY/VASCULAR | Facility: HOSPITAL | Age: 59
DRG: 871 | End: 2024-09-21
Attending: RADIOLOGY
Payer: COMMERCIAL

## 2024-09-21 LAB
ALBUMIN SERPL BCG-MCNC: 3.5 G/DL (ref 3.5–5.2)
ALP SERPL-CCNC: 129 U/L (ref 40–150)
ALT SERPL W P-5'-P-CCNC: 25 U/L (ref 0–70)
ANION GAP SERPL CALCULATED.3IONS-SCNC: 14 MMOL/L (ref 7–15)
AST SERPL W P-5'-P-CCNC: 30 U/L (ref 0–45)
BILIRUB SERPL-MCNC: 0.7 MG/DL
BUN SERPL-MCNC: 24.4 MG/DL (ref 8–23)
CALCIUM SERPL-MCNC: 7.8 MG/DL (ref 8.8–10.4)
CHLORIDE SERPL-SCNC: 98 MMOL/L (ref 98–107)
CREAT SERPL-MCNC: 2.4 MG/DL (ref 0.67–1.17)
EGFRCR SERPLBLD CKD-EPI 2021: 30 ML/MIN/1.73M2
ERYTHROCYTE [DISTWIDTH] IN BLOOD BY AUTOMATED COUNT: 14 % (ref 10–15)
GLUCOSE SERPL-MCNC: 102 MG/DL (ref 70–99)
HCO3 SERPL-SCNC: 18 MMOL/L (ref 22–29)
HCT VFR BLD AUTO: 32.7 % (ref 40–53)
HEMOCCULT STL QL: NEGATIVE
HGB BLD-MCNC: 11 G/DL (ref 13.3–17.7)
L PNEUMO1 AG UR QL IA: NEGATIVE
LVEF ECHO: NORMAL
MCH RBC QN AUTO: 28.4 PG (ref 26.5–33)
MCHC RBC AUTO-ENTMCNC: 33.6 G/DL (ref 31.5–36.5)
MCV RBC AUTO: 85 FL (ref 78–100)
PLATELET # BLD AUTO: 142 10E3/UL (ref 150–450)
POTASSIUM SERPL-SCNC: 3.8 MMOL/L (ref 3.4–5.3)
PROT SERPL-MCNC: 7.1 G/DL (ref 6.4–8.3)
RBC # BLD AUTO: 3.87 10E6/UL (ref 4.4–5.9)
S PNEUM AG SPEC QL: NEGATIVE
SODIUM SERPL-SCNC: 130 MMOL/L (ref 135–145)
SPECIMEN TYPE: NORMAL
TROPONIN T SERPL HS-MCNC: 13 NG/L
WBC # BLD AUTO: 6.2 10E3/UL (ref 4–11)

## 2024-09-21 PROCEDURE — 258N000003 HC RX IP 258 OP 636: Performed by: STUDENT IN AN ORGANIZED HEALTH CARE EDUCATION/TRAINING PROGRAM

## 2024-09-21 PROCEDURE — 250N000011 HC RX IP 250 OP 636: Performed by: STUDENT IN AN ORGANIZED HEALTH CARE EDUCATION/TRAINING PROGRAM

## 2024-09-21 PROCEDURE — 87899 AGENT NOS ASSAY W/OPTIC: CPT | Performed by: STUDENT IN AN ORGANIZED HEALTH CARE EDUCATION/TRAINING PROGRAM

## 2024-09-21 PROCEDURE — 82272 OCCULT BLD FECES 1-3 TESTS: CPT | Performed by: INTERNAL MEDICINE

## 2024-09-21 PROCEDURE — 255N000002 HC RX 255 OP 636: Performed by: STUDENT IN AN ORGANIZED HEALTH CARE EDUCATION/TRAINING PROGRAM

## 2024-09-21 PROCEDURE — 250N000013 HC RX MED GY IP 250 OP 250 PS 637: Performed by: STUDENT IN AN ORGANIZED HEALTH CARE EDUCATION/TRAINING PROGRAM

## 2024-09-21 PROCEDURE — 84484 ASSAY OF TROPONIN QUANT: CPT | Performed by: INTERNAL MEDICINE

## 2024-09-21 PROCEDURE — 272N000500 HC NEEDLE CR2

## 2024-09-21 PROCEDURE — 36415 COLL VENOUS BLD VENIPUNCTURE: CPT | Performed by: STUDENT IN AN ORGANIZED HEALTH CARE EDUCATION/TRAINING PROGRAM

## 2024-09-21 PROCEDURE — 74176 CT ABD & PELVIS W/O CONTRAST: CPT

## 2024-09-21 PROCEDURE — 250N000012 HC RX MED GY IP 250 OP 636 PS 637: Performed by: INTERNAL MEDICINE

## 2024-09-21 PROCEDURE — 255N000002 HC RX 255 OP 636: Performed by: RADIOLOGY

## 2024-09-21 PROCEDURE — 93306 TTE W/DOPPLER COMPLETE: CPT | Mod: 26 | Performed by: INTERNAL MEDICINE

## 2024-09-21 PROCEDURE — 36573 INSJ PICC RS&I 5 YR+: CPT

## 2024-09-21 PROCEDURE — 85027 COMPLETE CBC AUTOMATED: CPT | Performed by: STUDENT IN AN ORGANIZED HEALTH CARE EDUCATION/TRAINING PROGRAM

## 2024-09-21 PROCEDURE — 999N000208 ECHOCARDIOGRAM COMPLETE

## 2024-09-21 PROCEDURE — 120N000004 HC R&B MS OVERFLOW

## 2024-09-21 PROCEDURE — 80053 COMPREHEN METABOLIC PANEL: CPT | Performed by: STUDENT IN AN ORGANIZED HEALTH CARE EDUCATION/TRAINING PROGRAM

## 2024-09-21 PROCEDURE — 99222 1ST HOSP IP/OBS MODERATE 55: CPT | Performed by: INTERNAL MEDICINE

## 2024-09-21 PROCEDURE — C8929 TTE W OR WO FOL WCON,DOPPLER: HCPCS

## 2024-09-21 PROCEDURE — 250N000012 HC RX MED GY IP 250 OP 636 PS 637: Performed by: STUDENT IN AN ORGANIZED HEALTH CARE EDUCATION/TRAINING PROGRAM

## 2024-09-21 PROCEDURE — 99233 SBSQ HOSP IP/OBS HIGH 50: CPT | Performed by: INTERNAL MEDICINE

## 2024-09-21 PROCEDURE — C1751 CATH, INF, PER/CENT/MIDLINE: HCPCS

## 2024-09-21 PROCEDURE — 272N000559 HC SHEATH CR1

## 2024-09-21 RX ORDER — SIROLIMUS 1 MG/1
2 TABLET, FILM COATED ORAL EVERY EVENING
Status: DISCONTINUED | OUTPATIENT
Start: 2024-09-21 | End: 2024-09-25 | Stop reason: HOSPADM

## 2024-09-21 RX ORDER — IODIXANOL 320 MG/ML
50 INJECTION, SOLUTION INTRAVASCULAR ONCE
Status: COMPLETED | OUTPATIENT
Start: 2024-09-21 | End: 2024-09-21

## 2024-09-21 RX ADMIN — PERFLUTREN 2 ML: 6.52 INJECTION, SUSPENSION INTRAVENOUS at 09:55

## 2024-09-21 RX ADMIN — Medication 5 MG: at 20:13

## 2024-09-21 RX ADMIN — MOMETASONE FUROATE 2 PUFF: 100 AEROSOL RESPIRATORY (INHALATION) at 12:01

## 2024-09-21 RX ADMIN — PANTOPRAZOLE SODIUM 40 MG: 40 TABLET, DELAYED RELEASE ORAL at 17:09

## 2024-09-21 RX ADMIN — ACETAMINOPHEN 650 MG: 325 TABLET ORAL at 19:15

## 2024-09-21 RX ADMIN — ACETAMINOPHEN 650 MG: 325 TABLET ORAL at 01:09

## 2024-09-21 RX ADMIN — SIROLIMUS 2 MG: 1 TABLET, SUGAR COATED ORAL at 00:53

## 2024-09-21 RX ADMIN — LEVOTHYROXINE SODIUM 75 MCG: 0.03 TABLET ORAL at 06:15

## 2024-09-21 RX ADMIN — MOMETASONE FUROATE 2 PUFF: 100 AEROSOL RESPIRATORY (INHALATION) at 20:07

## 2024-09-21 RX ADMIN — SIROLIMUS 2 MG: 1 TABLET, SUGAR COATED ORAL at 17:08

## 2024-09-21 RX ADMIN — DULOXETINE HYDROCHLORIDE 20 MG: 20 CAPSULE, DELAYED RELEASE ORAL at 17:09

## 2024-09-21 RX ADMIN — PIPERACILLIN AND TAZOBACTAM 3.38 G: 3; .375 INJECTION, POWDER, FOR SOLUTION INTRAVENOUS at 17:30

## 2024-09-21 RX ADMIN — VANCOMYCIN HYDROCHLORIDE 1000 MG: 1 INJECTION, SOLUTION INTRAVENOUS at 20:08

## 2024-09-21 RX ADMIN — ALBUTEROL SULFATE 2 PUFF: 90 AEROSOL, METERED RESPIRATORY (INHALATION) at 15:46

## 2024-09-21 RX ADMIN — CETIRIZINE HYDROCHLORIDE 10 MG: 10 TABLET, FILM COATED ORAL at 17:10

## 2024-09-21 RX ADMIN — Medication 5 MG: at 00:53

## 2024-09-21 RX ADMIN — PIPERACILLIN AND TAZOBACTAM 3.38 G: 3; .375 INJECTION, POWDER, FOR SOLUTION INTRAVENOUS at 02:48

## 2024-09-21 RX ADMIN — ACETAMINOPHEN 650 MG: 325 TABLET ORAL at 13:01

## 2024-09-21 RX ADMIN — SODIUM CHLORIDE: 9 INJECTION, SOLUTION INTRAVENOUS at 10:26

## 2024-09-21 RX ADMIN — IODIXANOL 3 ML: 320 INJECTION, SOLUTION INTRAVASCULAR at 14:31

## 2024-09-21 RX ADMIN — AZATHIOPRINE 50 MG: 50 TABLET ORAL at 08:08

## 2024-09-21 RX ADMIN — ACETAMINOPHEN 650 MG: 325 TABLET ORAL at 08:08

## 2024-09-21 RX ADMIN — AZITHROMYCIN MONOHYDRATE 500 MG: 500 INJECTION, POWDER, LYOPHILIZED, FOR SOLUTION INTRAVENOUS at 21:09

## 2024-09-21 RX ADMIN — ROSUVASTATIN CALCIUM 40 MG: 40 TABLET, FILM COATED ORAL at 17:08

## 2024-09-21 RX ADMIN — SODIUM CHLORIDE: 9 INJECTION, SOLUTION INTRAVENOUS at 23:12

## 2024-09-21 RX ADMIN — PIPERACILLIN AND TAZOBACTAM 3.38 G: 3; .375 INJECTION, POWDER, FOR SOLUTION INTRAVENOUS at 10:26

## 2024-09-21 RX ADMIN — FLUTICASONE FUROATE 1 PUFF: 200 POWDER RESPIRATORY (INHALATION) at 12:03

## 2024-09-21 RX ADMIN — AMLODIPINE BESYLATE 10 MG: 5 TABLET ORAL at 12:00

## 2024-09-21 RX ADMIN — PANTOPRAZOLE SODIUM 40 MG: 40 TABLET, DELAYED RELEASE ORAL at 06:15

## 2024-09-21 ASSESSMENT — ACTIVITIES OF DAILY LIVING (ADL)
WALKING_OR_CLIMBING_STAIRS_DIFFICULTY: NO
VISION_MANAGEMENT: GLASSES
ADLS_ACUITY_SCORE: 33
DRESSING/BATHING_DIFFICULTY: NO
ADLS_ACUITY_SCORE: 22
TOILETING_ISSUES: NO
ADLS_ACUITY_SCORE: 33
ADLS_ACUITY_SCORE: 33
ADLS_ACUITY_SCORE: 22
ADLS_ACUITY_SCORE: 22
CHANGE_IN_FUNCTIONAL_STATUS_SINCE_ONSET_OF_CURRENT_ILLNESS/INJURY: NO
WEAR_GLASSES_OR_BLIND: YES
ADLS_ACUITY_SCORE: 33
ADLS_ACUITY_SCORE: 33
DIFFICULTY_EATING/SWALLOWING: NO
ADLS_ACUITY_SCORE: 22
ADLS_ACUITY_SCORE: 33
ADLS_ACUITY_SCORE: 22
ADLS_ACUITY_SCORE: 33
CONCENTRATING,_REMEMBERING_OR_MAKING_DECISIONS_DIFFICULTY: NO
ADLS_ACUITY_SCORE: 33
ADLS_ACUITY_SCORE: 22
ADLS_ACUITY_SCORE: 22
ADLS_ACUITY_SCORE: 33
ADLS_ACUITY_SCORE: 22
ADLS_ACUITY_SCORE: 22
DOING_ERRANDS_INDEPENDENTLY_DIFFICULTY: NO
ADLS_ACUITY_SCORE: 22
ADLS_ACUITY_SCORE: 22
ADLS_ACUITY_SCORE: 37
ADLS_ACUITY_SCORE: 22
ADLS_ACUITY_SCORE: 22
FALL_HISTORY_WITHIN_LAST_SIX_MONTHS: NO

## 2024-09-21 NOTE — PLAN OF CARE
Problem: Adult Inpatient Plan of Care  Goal: Optimal Comfort and Wellbeing  Outcome: Progressing     Problem: Diarrhea  Goal: Effective Diarrhea Management  Outcome: Progressing  Intervention: Manage Diarrhea  Recent Flowsheet Documentation  Taken 9/21/2024 0100 by Dena Foss RN  Isolation Precautions: enteric precautions maintained  Medication Review/Management: medications reviewed     Problem: Infection  Goal: Absence of Infection Signs and Symptoms  Outcome: Progressing  Intervention: Prevent or Manage Infection  Recent Flowsheet Documentation  Taken 9/21/2024 0100 by Dena Foss RN  Isolation Precautions: enteric precautions maintained   Goal Outcome Evaluation:         Patient denied pain throughout the night and was able to sleep for most of the night except for needing to get up to the bathroom for loose/watery stools X2 tonight; C diff negative. Patient reports stools are slightly less watery than yesterday. Was finally able to get PIV with ultrasound but will still need a PICC today. Tele Sinus tach BBB; 's at rest; up to 155-160 with ambulation to the bathroom. Patient is generally easily frustrated and cranky but manageable. He did get continuous NS at 100ml/hr after getting PIV as well as late doses of azithromycin and Zosyn. Patient kept NPO except with medications awaiting GI consult today. Labs requested per patient to be drawn at 0700. UA for legionella was sent and negative. Patient did run a fever overnight for which he took Tylenol and used ice packs; patient then felt cold and shivering this morning when temp was 101.1 oral. Patient reports without food in his stomach he will only be able to take medications a couple at a time otherwise he will vomit; he knows his anti-rejection medications are the most important including his Sirolimus which was given late but given during this shift because otherwise he would have missed a dose.       Vitals:    09/21/24 0101 09/21/24 0250  09/21/24 0302 09/21/24 0613   BP:   129/80    BP Location:   Left arm    Pulse:   120    Resp:   20    Temp: (!) 102.7  F (39.3  C) 99.7  F (37.6  C) 99.5  F (37.5  C) (!) 101.1  F (38.4  C)   TempSrc: Oral Oral Oral Oral   SpO2:   95%    Weight:    86.8 kg (191 lb 7 oz)   Height:

## 2024-09-21 NOTE — SEDATION DOCUMENTATION
Interventional Radiology Intra-procedural Nursing Note  Patient Name: Everton Larios  Medical Record Number: [id number]  Today's Date: September 21, 2024    Procedure: PICC placement, no sedation.        Administered medication totals:  Versed 0 mg IVP  Fentanyl 0 mcg IVP    Procedure well tolerated by patient without complications. Procedure end with debrief by Dr. Desai.  Report given to Meka GARCIA. Pt transported to CT and with PICC line in place.     AVS Discharge Instructions reviewed with and provided to patient and/or responsible adult. All questions and concerns addressed and understanding was verbalized.    Note: Patient entered Interventional Radiology Suite number 1 via cart. Patient awake, alert and orientated. Assisted onto procedural table in supine position. Prepped and draped.  Dr. Desai in room. Time out and procedure started. Vital signs stable. Telemetry reading sinus tach.

## 2024-09-21 NOTE — PLAN OF CARE
"Problem: Adult Inpatient Plan of Care  Goal: Plan of Care Review  Outcome: Progressing  Flowsheets (Taken 9/21/2024 1359)  Plan of Care Reviewed With: patient  Overall Patient Progress: improving  POC reviewed with patient. Verbalized understanding.    Some of his scheduled AM meds re-timed for later today. He reports if he takes too many PO meds at a time on an empty stomach, he will have n/v. Has been NPO, awaiting GI consult.   Urology signed off today, see note.       Problem: Diarrhea  Goal: Effective Diarrhea Management  Outcome: Progressing  Intervention: Manage Diarrhea  Recent Flowsheet Documentation  Taken 9/21/2024 1200 by Bebe Christian, RN  Medication Review/Management: medications reviewed  Taken 9/21/2024 0813 by Bebe Christian RN  Medication Review/Management: medications reviewed  Loose stools x 1 this shift. C-diff neg. Has hx of ulcerative colitis. Patient reports \"one drop of blood\" seen in his stools this last week. NPO since midnight, awaiting GI consult. No n/v. Aspirin on hold.  Abd/pelvic CT without contrast this afternoon.        Problem: Infection  Goal: Absence of Infection Signs and Symptoms  Outcome: Progressing  Has been running temps despite PRN Tyl q4h. Chest CT shows LLL pna. Currently being treated with IV Vanco, IV Zosyn and IV Zithormax. ID following. BC NGTD from 9/21. Awaiting collection of sputum sample, collection cup at bedside and patient verbalized understanding to notify staff if he produces enough sputum to send for culture. Is on long term anti-rejection meds for hx of heart transplant. TTE done this morning, results pending.   Most recent oral temp: 100.4.  .3  Lactic 0.9  Procal 0.64  WBC 6.2   ml/hr. Has been NPO since MN.  Ice packs to chest and forehead.  He reported being scratched by a dog on his R arm earlier this wk. His RFA PIV also infiltrated this afternoon. PIV removed, warm pack applied. Red, tender, edema present. Note left for MD. "       Problem: Pneumonia  Goal: Resolution of Infection Signs and Symptoms  Outcome: Progressing      Goal: Effective Oxygenation and Ventilation  Outcome: Progressing  Intervention: Promote Airway Secretion Clearance  Recent Flowsheet Documentation  Taken 9/21/2024 1200 by Bebe Christian RN  Cough And Deep Breathing: done independently per patient  Taken 9/21/2024 0813 by Bebe Christian RN  Cough And Deep Breathing: done independently per patient  Intervention: Optimize Oxygenation and Ventilation  Recent Flowsheet Documentation  Taken 9/21/2024 1200 by Bebe Christian RN  Head of Bed ([facility]) Positioning: HOB at 20 degrees  Taken 9/21/2024 0930 by Bbee Christian RN  Head of Bed ([facility]) Positioning: HOB at 20 degrees  Taken 9/21/2024 0813 by Bebe Christian RN  Head of Bed ([facility]) Positioning: HOB at 20 degrees  SpO2 >90% RA.   Audible crackles posterior LLL, otherwise clear.  Does c/o MATA. Unlabored, reg resp at rest.   VBG PCO2 25.  HGB 11.  Infrequent dry non-productive cough.       Problem: Hypertension Acute  Goal: Blood Pressure Within Desired Range  Outcome: Progressing  Intervention: Normalize Blood Pressure  Recent Flowsheet Documentation  Taken 9/21/2024 1200 by Bebe Christian RN  Medication Review/Management: medications reviewed  Taken 9/21/2024 0813 by Bebe Christian RN  Medication Review/Management: medications reviewed  BP Readings from Last 6 Encounters:   09/21/24 (!) 155/87   04/18/24 (!) 151/93   03/07/24 114/72   02/21/24 139/87   11/08/23 130/82   05/24/23 120/78   PO Amlodipine 10 mg given as scheduled.   Losartan on hold.   Trops neg x 3.      Problem: Cardiac Output Decreased  Goal: Effective Cardiac Output  Outcome: Progressing  Intervention: Optimize Cardiac Output  Recent Flowsheet Documentation  Taken 9/21/2024 1200 by Bebe Christian RN  Head of Bed ([facility]) Positioning: HOB at 20 degrees  Taken 9/21/2024 0930 by Bebe Christian RN  Head of Bed ([facility]) Positioning: HOB at 20 degrees  Taken 9/21/2024  0813 by Bebe Christian, RN  Head of Bed (HOB) Positioning: HOB at 20 degrees  Tele: ST, BBB, ST depression and TW inversion V1. HR ranges from 115's bpm at rest up to 150's bpm when up to the bathroom.   Denies CP, palpitations, dizziness.   Na 130.  Pro BNP 1445.  Ca 7.8.        Goal Outcome Evaluation:      Plan of Care Reviewed With: patient    Overall Patient Progress: improvingOverall Patient Progress: improving

## 2024-09-21 NOTE — CONSULTS
GASTROENTEROLOGY CONSULT NOTE       CONSULTING PHYSICIAN REASON FOR CONSULTATION   Roly Kimble MD I was asked to see this patient for dark-colored stools     CHIEF COMPLAINT   Fever and shortness of breath with poor appetite     HISTORY OF PRESENT ILLNESS   Mr Everton Larios is a pleasant 59 year old man who presented to the hospital with complaints of fever and shortness of breath and was found to have left lower lobe pneumonia.    During his history he mentioned that he has had dark-colored stools over the last couple weeks and noticed 1 single drop of bright red blood as well.  Patient does have history of ulcerative colitis however since his heart transplant as he has been on immunosuppression he has not been requiring any additional medication for ulcerative colitis per se.  Since his heart transplant he is receiving his gastroenterology care at [facility] and gets follow-up and scopes done there as well.  Patient denies any intake of NSAIDs.    He had been on iron supplementation for 3 months.  He used to use Pepto-Bismol at home off and on.    Previous endoscopic history:  Patient said that he had a EGD done 2-1/2 years ago for his Pierce's esophagus at the [facility].  He is on PPI at home.  Colonoscopy done 2 and half years ago as well for follow-up of his ulcerative colitis.  This was done at [facility] as well.    Hospital course:  He was treated with antibiotics for his pneumonia.  Infectious disease was consulted.  A CT scan of abdomen pelvis was ordered today which is still pending.     PAST HISTORY      Past Medical History:   Diagnosis Date     Alcohol abuse      Arthritis     hands, neck     ASD (atrial septal defect)     s/p repair age 3     Asthma      Atrial fibrillation (H)      Pierce's esophagus 10/4/2018     Pierce's esophagus      Cataract      CHF (congestive heart failure) (H)      Chronic rhinitis 10/4/2018     Chronic systolic heart failure (H) 10/4/2018      Clotting disorder (H24)      Congenital cardiomyopathy in  (H)      Depression 10/4/2018     Depression      Diastolic dysfunction      DJD (degenerative joint disease)     neck     DJD (degenerative joint disease) - neck 10/4/2018     H/O congenital atrial septal defect (ASD) repair at age 3 10/4/2018     History of anesthesia complications     nausea     History of transfusion      Hypothyroidism      Hypothyroidism due to medication 10/4/2018     ICD (implantable cardioverter-defibrillator) in place      ICD, [employer] ; gen change 2018 10/4/2018     Intermittent asthma without complication 10/4/2018     Nonischemic cardiomyopathy (H) 10/4/2018     On amiodarone therapy 10/4/2018     Pacemaker      Paroxysmal atrial fibrillation (H) 10/4/2018     S/P ablation of atrial fibrillation 10/4/2018     Systolic heart failure (H)      Ulcerative colitis (H)      Ulcerative colitis (H)      Ventricular tachycardia (H) 10/4/2018     Ventricular tachycardia (H)       Past Surgical History:   Procedure Laterality Date     ABLATION OF DYSRHYTHMIC FOCUS      for A fib     AICD, DUAL CHAMBER       ASD REPAIR  1968     BRONCHOSCOPY (RIGID OR FLEXIBLE), DIAGNOSTIC N/A 2019    Procedure: BRONCHOSCOPY, WITH BAL;  Surgeon: Margot Chiang MD;  Location: [facility]     CARDIAC DEFIBRILLATOR PLACEMENT      x2--last was 2018     COLONOSCOPY N/A 2020    Procedure: COLONOSCOPY, WITH POLYPECTOMY AND BIOPSY;  Surgeon: Ranjeet Cooper MD;  Location:  GI     COLONOSCOPY N/A 2015    Procedure: COLONOSCOPY with biopsy;  Surgeon: Fernie Akers MD;  Location: [facility];  Service:      COLONOSCOPY N/A 2017    Procedure: COLONOSCOPY with biopsies and polypectomies using snare;  Surgeon: Gael Romero MD;  Location: [facility];  Service:      COLONOSCOPY N/A 3/8/2022    Procedure: COLONOSCOPY, WITH POLYPECTOMY AND BIOPSY;  Surgeon: Paddy Saldivar DO;  Location:   GI     CV CORONARY ANGIOGRAM N/A 02/12/2020    Procedure: CV CORONARY ANGIOGRAM;  Surgeon: Isiah Mcallister MD;  [facility]     CV CORONARY ANGIOGRAM N/A 03/17/2021    Procedure: CV CORONARY ANGIOGRAM;  Surgeon: Santino Mckeon MD;  [facility]     CV CORONARY ANGIOGRAM N/A 2/23/2022    Procedure: CV CORONARY ANGIOGRAM;  Surgeon: Yves Rodrigues MD;  [facility]     CV CORONARY ANGIOGRAM N/A 2/23/2023    Procedure: Coronary Angiogram;  Surgeon: Santino Mckeon MD;  [facility]     CV HEART BIOPSY N/A 03/04/2019    Procedure: Heart Biopsy;  Surgeon: Abhijeet Titus MD;  [facility]     CV HEART BIOPSY N/A 03/25/2019    Procedure: Heart Biopsy;  Surgeon: Yves Rodrigues MD;  [facility]     CV HEART BIOPSY N/A 03/28/2019    Procedure: Heart Cath Heart Biopsy;  Surgeon: Yves Rodrigues MD;  [facility]     CV HEART BIOPSY N/A 04/10/2019    Procedure: CV HEART BIOPSY;  Surgeon: Isiah Mcallister MD;  [facility]     CV HEART BIOPSY N/A 04/24/2019    Procedure: CV HEART BIOPSY;  Surgeon: Isiah Mcallister MD;  [facility]     CV HEART BIOPSY N/A 05/08/2019    Procedure: CV HEART BIOPSY;  Surgeon: Isiah Mcallister MD;  [facility]     CV HEART BIOPSY N/A 06/04/2019    Procedure: CV HEART BIOPSY;  Surgeon: Alton Solomon MD;  [facility]     CV HEART BIOPSY N/A 05/22/2019    Procedure: CV HEART BIOPSY;  Surgeon: Yves Rodrigues MD;  [facility]     CV HEART BIOPSY N/A 07/17/2019    Procedure: CV HEART BIOPSY;  Surgeon: Isiah Mcallister MD;  [facility]     CV HEART BIOPSY N/A 08/13/2019    Procedure: CV HEART BIOPSY;  Surgeon: Jackson Patten MD;   [facility]     CV HEART BIOPSY N/A 10/15/2019    Procedure: CV HEART BIOPSY;  Surgeon: Santino Mckeon MD;  [facility]     CV HEART BIOPSY N/A 02/12/2020    Procedure: CV HEART BIOPSY;  Surgeon: Isiah Mcallister MD;  Location:  [facility]     CV HEART BIOPSY N/A 05/05/2020    Procedure: CV HEART BIOPSY;  Surgeon: Yves Rodrigues MD;  [facility]     CV HEART BIOPSY N/A 03/17/2021    Procedure: CV HEART BIOPSY;  Surgeon: Santino Mckeon MD;  Location:  [facility]     CV HEART BIOPSY N/A 5/10/2022    Procedure: Heart Biopsy;  Surgeon: Yves Rodrigues MD;  Location:  [facility]     CV INTRA AORTIC BALLOON N/A 02/18/2019    Procedure: Intra-Aortic Balloon;  Surgeon: Alton Solomon MD;  Location:  [facility]     CV INTRA AORTIC BALLOON N/A 02/20/2019    Procedure: Replace subclavian IABP;  Surgeon: Yves Rodrigues MD;  Location:  [facility]     CV INTRAVASULAR ULTRASOUND N/A 02/12/2020    Procedure: CV INTRAVASCULAR ULTRASOUND;  Surgeon: Isiah Mcallister MD;  Location:  [facility]     CV LEFT HEART CATH N/A 03/19/2019    Procedure: Left Heart Cath;  Surgeon: Yves Rodrigues MD;  Location:  [facility]     CV LEFT HEART CATH N/A 02/12/2020    Procedure: Left Heart Cath;  Surgeon: Isiah Mcallister MD;  Location:  [facility]     CV MYOCARDIAL BIOPSY N/A 03/19/2019    Procedure: RHC/HBx - Femoral access for 3/19 per Nimisha GONZALEZ;  Surgeon: Yves Rodrigues MD;  Location:  [facility]     CV MYOCARDIAL BIOPSY N/A 03/11/2019    Procedure: ADD ON RHC/HBX;  Surgeon: Alton Solomon MD;  Location:  [facility]     CV RIGHT HEART CATH MEASUREMENTS RECORDED N/A 03/25/2019    Procedure: Right Heart Cath;  Surgeon: Yves Rodrigues MD;  Location:  [facility]     CV  RIGHT HEART CATH MEASUREMENTS RECORDED N/A 03/28/2019    Procedure: Heart Cath Right Heart Cath;  Surgeon: Yves Rodrigues MD;  Location:  [facility]     CV RIGHT HEART CATH MEASUREMENTS RECORDED N/A 04/24/2019    Procedure: CV RIGHT HEART CATH;  Surgeon: Isiah Mcallister MD;  Location:  [facility]     CV RIGHT HEART CATH MEASUREMENTS RECORDED N/A 06/04/2019    Procedure: CV RIGHT HEART CATH;  Surgeon: Alton Solomon MD;  Location:  [facility]     CV RIGHT HEART CATH MEASUREMENTS RECORDED N/A 05/22/2019    Procedure: CV RIGHT HEART CATH;  Surgeon: Yves Rodrigues MD;  Location:  [facility]     CV RIGHT HEART CATH MEASUREMENTS RECORDED N/A 08/13/2019    Procedure: CV RIGHT HEART CATH;  Surgeon: Jackson Patten MD;  Location:  [facility]     CV RIGHT HEART CATH MEASUREMENTS RECORDED N/A 10/15/2019    Procedure: CV RIGHT HEART CATH;  Surgeon: Santino Mckeon MD;  Location:  [facility]     CV RIGHT HEART CATH MEASUREMENTS RECORDED N/A 02/12/2020    Procedure: CV RIGHT HEART CATH;  Surgeon: Isiah Mcallister MD;  Location:  [facility]     CV RIGHT HEART CATH MEASUREMENTS RECORDED N/A 03/17/2021    Procedure: CV RIGHT HEART CATH;  Surgeon: Santino Mckeon MD;  Location:  [facility]     CV RIGHT HEART CATH MEASUREMENTS RECORDED N/A 2/23/2022    Procedure: CV RIGHT HEART CATH;  Surgeon: Yves Rodrigues MD;  Location:  [facility]     CV RIGHT HEART CATH MEASUREMENTS RECORDED N/A 5/10/2022    Procedure: Right Heart Catheterization;  Surgeon: Yves Rodrigues MD;  Location:  [facility]     CV RIGHT HEART CATH MEASUREMENTS RECORDED N/A 2/23/2023    Procedure: Right Heart Catheterization;  Surgeon: Santino Mckeon MD;  Location:  [facility]     ESOPHAGOSCOPY, GASTROSCOPY, DUODENOSCOPY (EGD), COMBINED N/A 12/19/2017    Procedure:  ESOPHAGOGASTRODUODENOSCOPY (EGD) with biopsies;  Surgeon: Gael Romero MD;  Location: [facility];  Service:      ESOPHAGOSCOPY, GASTROSCOPY, DUODENOSCOPY (EGD), COMBINED N/A 3/8/2022    Procedure: ESOPHAGOGASTRODUODENOSCOPY, WITH BIOPSY;  Surgeon: Paddy Saldivar DO;  [facility]     H STATISTIC PICC LINE INSERTION >5YR, FAILED Bilateral 10/28/2021    L sided SVC     HAND SURGERY Left      HC REVISE MEDIAN N/CARPAL TUNNEL SURG  09/21/2016    Procedure: OPEN RIGHT CARPAL TUNNEL RELEASE CORTISONE INJECTION RIGHT THUMB;  Surgeon: Duong Santoyo MD;  Location: [facility];  Service: Orthopedics     INCISION AND DRAINAGE CHEST WASHOUT, COMBINED N/A 03/21/2019    Procedure: Incision And Drainage; Evacuation Right Chest Wall Hematoma;  Surgeon: Dewayne House MD;  [facility]     INSERT INTRAAORTIC BALLOON PUMP Left 02/19/2019    Procedure: Insert left  Subclavian Balloon Pump,  Removal Right femoral arterial balloom pump sheath;  Surgeon: Dewayne House MD;  [facility]     INSERT INTRAAORTIC BALLOON PUMP Left 02/21/2019    Procedure: SUBCLAVIAN BALLOON PUMP PLACEMENT;  Surgeon: Ben White MD;  [facility]     INSERT INTRACORONARY STENT      x2     IR PICC PLACEMENT > 5 YRS OF AGE  05/08/2019     TRANSPLANT HEART RECIPIENT N/A 02/24/2019    Procedure: TRANSPLANT HEART RECIPIENT;  Surgeon: Dewayne House MD;  [facility]        Family History Social History   Family History   Problem Relation Age of Onset     Skin Cancer Mother      Endometrial Cancer Mother      Osteoporosis Mother      Hypertension Father      Endometrial Cancer Maternal Grandmother      Hip fracture No family hx of      Nephrolithiasis No family hx of      No family history of colon, liver, esophagus, stomach, pancreas cancer. No h/o Pierce esophagus, celiac disease or IBD. Social History     Socioeconomic History     Marital status: Single   Tobacco Use     Smoking status: Former      Current packs/day: 0.00     Average packs/day: 1 pack/day for 28.8 years (28.8 ttl pk-yrs)     Types: Cigarettes     Start date: 1/1/1980     Quit date: 11/6/2008     Years since quitting: 15.8     Passive exposure: Past     Smokeless tobacco: Never   Vaping Use     Vaping status: Never Used   Substance and Sexual Activity     Alcohol use: Yes     Alcohol/week: 1.0 - 2.0 standard drink of alcohol     Types: 1 - 2 Cans of beer per week     Drug use: No   Social History Narrative    Everton is a retired . He lives by himself in a townhouse in [place]. He has no lawn care responsibilities. He will be staying with his folks during his post-hospital early transplant care time. No history of TB exposures.     He works part-time at [employer].     Social Determinants of Health     Financial Resource Strain: Low Risk  (9/21/2024)    Financial Resource Strain      Within the past 12 months, have you or your family members you live with been unable to get utilities (heat, electricity) when it was really needed?: No   Food Insecurity: Low Risk  (9/21/2024)    Food Insecurity      Within the past 12 months, did you worry that your food would run out before you got money to buy more?: No      Within the past 12 months, did the food you bought just not last and you didn t have money to get more?: No   Transportation Needs: Low Risk  (9/21/2024)    Transportation Needs      Within the past 12 months, has lack of transportation kept you from medical appointments, getting your medicines, non-medical meetings or appointments, work, or from getting things that you need?: No   Interpersonal Safety: Low Risk  (9/21/2024)    Interpersonal Safety      Do you feel physically and emotionally safe where you currently live?: Yes      Within the past 12 months, have you been hit, slapped, kicked or otherwise physically hurt by someone?: No      Within the past 12 months, have you been humiliated or emotionally abused in other ways  by your partner or ex-partner?: No   Housing Stability: High Risk (9/21/2024)    Housing Stability      Do you have housing? : No      Are you worried about losing your housing?: No        MEDICATIONS & ALLERGIES   Medications Prior to Admission   Medication Sig Dispense Refill Last Dose     acetaminophen (TYLENOL) 325 MG tablet Take 2 tablets (650 mg) by mouth every 4 hours as needed for mild pain   Unknown     albuterol (PROAIR HFA/PROVENTIL HFA/VENTOLIN HFA) 108 (90 Base) MCG/ACT inhaler Inhale 2 puffs into the lungs every 6 hours as needed for shortness of breath or wheezing 18 g 5 Unknown     amLODIPine (NORVASC) 5 MG tablet Take 2 tablets (10 mg) by mouth daily 180 tablet 11 9/20/2024     aspirin (ASA) 81 MG chewable tablet Take 1 tablet (81 mg) by mouth daily   9/20/2024     azaTHIOprine 75 MG TABS Take 75 mg by mouth daily 90 tablet 3 9/20/2024 at 50 mg     calcium carbonate (OS-RADAMES) 500 MG tablet Take 1 tablet by mouth daily.   9/20/2024     cetirizine (ZYRTEC) 10 MG tablet Take 10 mg by mouth daily   9/20/2024     clindamycin (CLINDAMAX) 1 % external gel Apply twice daily as needed for acne on the spots 60 g 11 More than a month     DULoxetine (CYMBALTA) 20 MG capsule Take 1 capsule (20 mg) by mouth daily 90 capsule 2 9/20/2024     fluticasone (FLOVENT HFA) 220 MCG/ACT inhaler INHALE 2 PUFFS INTO THE LUNGS TWICE A DAY 12 g 3 9/20/2024     Fluticasone Propionate, Inhal, (FLUTICASONE PROPIONATE DISKUS) 100 MCG/ACT AEPB INHALE 1 PUFF INTO THE LUNGS EVERY 12 HOURS. 60 each 3 9/20/2024     levothyroxine (SYNTHROID/LEVOTHROID) 75 MCG tablet Take 1 tablet (75 mcg) by mouth daily 90 tablet 3 9/20/2024     losartan (COZAAR) 25 MG tablet Take 3 tablets (75 mg) by mouth daily 270 tablet 3 9/20/2024     melatonin 3 MG tablet Take 3 mg by mouth nightly as needed for sleep.   Unknown     mometasone furoate (ASMANEX HFA) 100 MCG/ACT inhaler INHALE 2 PUFFS INTO THE LUNGS TWICE A DAY 13 g 3 9/20/2024     multivitamin  w/minerals (THERA-VIT-M) tablet Take 1 tablet by mouth daily   9/20/2024     omeprazole (PRILOSEC) 40 MG DR capsule TAKE 1 CAPSULE BY MOUTH EVERY DAY 30 capsule 11 9/20/2024     RESTASIS 0.05 % ophthalmic emulsion Place 1 drop into both eyes 2 times daily as needed for dry eyes.   Unknown     rosuvastatin (CRESTOR) 40 MG tablet Take 1 tablet (40 mg) by mouth daily 90 tablet 3 9/20/2024     sildenafil (VIAGRA) 50 MG tablet Take 1 tablet (50 mg) by mouth daily as needed (Erectile dysfunction) 10 tablet 11 Unknown     sirolimus (GENERIC EQUIVALENT) 1 MG tablet Take 2 tablets (2 mg) by mouth daily 180 tablet 3 9/19/2024 at PM     tadalafil (CIALIS) 10 MG tablet Take 1 tablet (10 mg) by mouth daily as needed (Erectile dysfunction) 10 tablet 3 Unknown      Allergies   Allergen Reactions     Hydromorphone Other (See Comments)     Significant Delirium     Adhesive Tape Blisters     Tegaderm causes bruises, blisters and extreme irritation.     Lisinopril Other (See Comments)     hypotension     Quinolones Other (See Comments)     Would not rx quinolones until QTc interval improved.      Tobramycin Other (See Comments)     Would not use aminoglycosides as his kidney function is very borderline     Codeine Nausea        Current Facility-Administered Medications:      acetaminophen (TYLENOL) tablet 650 mg, 650 mg, Oral, Q4H PRN, 650 mg at 09/21/24 1301 **OR** acetaminophen (TYLENOL) Suppository 650 mg, 650 mg, Rectal, Q4H PRN, Morillo, Ronn, DO     albuterol (PROVENTIL HFA/VENTOLIN HFA) inhaler, 2 puff, Inhalation, Q6H PRN, Morillo, Ronn, DO     amLODIPine (NORVASC) tablet 10 mg, 10 mg, Oral, Daily, Morillo, Ronn, DO, 10 mg at 09/21/24 1200     [Held by provider] aspirin (ASA) chewable tablet 81 mg, 81 mg, Oral, Daily, Morillo, Ronn, DO     azaTHIOprine (IMURAN) tablet 50 mg, 50 mg, Oral, Daily, Morillo, Ronn, DO, 50 mg at 09/21/24 0808     azithromycin (ZITHROMAX) 500 mg in sodium chloride 0.9 % 250 mL intermittent  infusion, 500 mg, Intravenous, Q24H, Morillo, Ronn, DO, Last Rate: 0 mL/hr at 09/20/24 2151, Restarted at 09/21/24 0121     calcium carbonate (TUMS) chewable tablet 1,000 mg, 1,000 mg, Oral, 4x Daily PRN, Morillo, Ronn, DO     cetirizine (zyrTEC) tablet 10 mg, 10 mg, Oral, Daily, Morillo, Ronn, DO     DULoxetine (CYMBALTA) DR capsule 20 mg, 20 mg, Oral, Daily, Morillo, Ronn, DO     fluticasone (ARNUITY ELLIPTA) 200 MCG/ACT inhaler 1 puff, 1 puff, Inhalation, Daily, Morillo, Ronn, DO, 1 puff at 09/21/24 1203     hydrALAZINE (APRESOLINE) tablet 10 mg, 10 mg, Oral, Q4H PRN **OR** hydrALAZINE (APRESOLINE) injection 10 mg, 10 mg, Intravenous, Q4H PRN, Morillo, Ronn, DO     levothyroxine (SYNTHROID/LEVOTHROID) tablet 75 mcg, 75 mcg, Oral, QAM AC, Morillo, Ronn, DO, 75 mcg at 09/21/24 0615     lidocaine (LMX4) cream, , Topical, Q1H PRN, Morillo, Ronn, DO     lidocaine (LMX4) cream, , Topical, Q1H PRN, Roly Kimble MD     lidocaine 1 % 0.1-1 mL, 0.1-1 mL, Other, Q1H PRN, Morillo, Ronn, DO     lidocaine 1 % 0.1-5 mL, 0.1-5 mL, Other, Q1H PRN, Roly Kimble MD     [Held by provider] losartan (COZAAR) tablet 75 mg, 75 mg, Oral, Daily, Morillo, Ronn, DO     melatonin tablet 5 mg, 5 mg, Oral, At Bedtime PRN, Morillo, Ronn, DO, 5 mg at 09/21/24 0053     mometasone furoate (ASMANEX HFA) 100 MCG/ACT inhaler 2 puff, 2 puff, Inhalation, BID, Morillo, Ronn, DO, 2 puff at 09/21/24 1201     ondansetron (ZOFRAN ODT) ODT tab 4 mg, 4 mg, Oral, Q6H PRN **OR** ondansetron (ZOFRAN) injection 4 mg, 4 mg, Intravenous, Q6H PRN, Ronn Morillo DO     pantoprazole (PROTONIX) EC tablet 40 mg, 40 mg, Oral, BID AC, Ronn Morillo DO, 40 mg at 09/21/24 0615     piperacillin-tazobactam (ZOSYN) 3.375 g vial to attach to  mL bag, 3.375 g, Intravenous, Q8H, Ronn Morillo DO, 3.375 g at 09/21/24 1026     polyethylene glycol (MIRALAX) Packet 17 g, 17 g, Oral, BID PRN, Ronn Morillo DO     rosuvastatin (CRESTOR)  "tablet 40 mg, 40 mg, Oral, Daily, Morillo, Ronn, DO     senna-docusate (SENOKOT-S/PERICOLACE) 8.6-50 MG per tablet 1 tablet, 1 tablet, Oral, BID PRN **OR** senna-docusate (SENOKOT-S/PERICOLACE) 8.6-50 MG per tablet 2 tablet, 2 tablet, Oral, BID PRN, Morillo, Ronn, DO     sirolimus (GENERIC EQUIVALENT) tablet 2 mg, 2 mg, Oral, QPM, Denny Novak MD, 2 mg at 09/21/24 0053     sodium chloride (PF) 0.9% PF flush 10-40 mL, 10-40 mL, Intracatheter, Once PRN, Roly Kimble MD     sodium chloride (PF) 0.9% PF flush 3 mL, 3 mL, Intracatheter, Q8H, Morillo, Ronn, DO     sodium chloride (PF) 0.9% PF flush 3 mL, 3 mL, Intracatheter, q1 min prn, Morillo, Ronn, DO     sodium chloride 0.9 % infusion, , Intravenous, Continuous, Morillo, Ronn, DO, Last Rate: 100 mL/hr at 09/21/24 1026, New Bag at 09/21/24 1026     vancomycin (VANCOCIN) 1,000 mg in 200 mL dextrose intermittent infusion, 1,000 mg, Intravenous, Q24H, Morillo, Ronn, DO    REVIEW OF SYSTEMS   Except as noted elsewhere in the note, the ROS is negative for Constitutional, HEENT, CV, Respiratory, GI, , Musculoskeletal, Neuro, Psychiatric, Heme/Lymph node, Allergic, Endocrine, and Skin.  A complete 14-point review of systems was was done and was found to be negative other than what has been stated up in history of present illness.     OBJECTIVE   Vitals BP (!) 155/87 (BP Location: Left arm)   Pulse (!) 122   Temp 100.4  F (38  C) (Oral)   Resp 29   Ht 1.727 m (5' 8\")   Wt 86.8 kg (191 lb 7 oz)   SpO2 100%   BMI 29.11 kg/m           PHYSICAL EXAM:  General:   Patient is lying in bed in no acute distress. Appears well nourished.   Head:  Atraumatic, normocephalic   Eyes:   Conjunctivae normal, no scleral icterus. Extraocular movements intact. Pupils equal, round, and reactive to light.   Ears:  Hearing grossly intact   Mouth:  Oral mucosa moist, normal. No ulcers.   Neck:   Supple, no carotid bruits or elevated JVP. Thyroid not palpable. No cervical " lymphadenopathy. Trachea midline.   Chest:  Clear to auscultation bilaterally. No wheezes, rales or rhonchi. Bilateral equal air entry. Normal respiratory effort. No cyanosis.   Heart/vasc:  S1, S2 heard normal. No murmurs, gallops or rubs. All peripheral pulses palpable and symmetric.   Abdomen:   Soft, non-distended, non-tender. No peritoneal signs. No organomegaly. No shifting dullness or ascites. Bowel sounds are normal.   Neurologic:   Alert and oriented x 3. No focal neurologic deficits. Cranial nerves II-XII grossly intact. Strength 5/5 in flexors and extensors of upper and lower extremities. Sensation symmetric and equal in all limbs and on trunk. Reflexes 2+ in elbows, knees and ankles. No asterixis.   Lymphatic:   No axillary, cervical or inguinal lymphadenopathy.   Musculosk:  No joint effusions. No clubbing. No edema.   Psychiatric:  Normal mood, affect and insight. Not depressed, suicidal or homicidal.   Skin:  Warm and dry, color normal appropriate for race. No obvious rashes or lesions noted.     LABORATORY AND IMAGING   BMP   Recent Labs   Lab Test 09/21/24  0759 09/20/24  1600 06/18/24  1420   POTASSIUM 3.8 3.8 4.8   CHLORIDE 98 96* 105   CO2 18* 15* 21*   BUN 24.4* 27.2* 39.5*   CR 2.40* 2.58* 2.15*   ANIONGAP 14 18* 12        HEMATOLOGY PANEL   Recent Labs   Lab Test 09/21/24  0759 09/20/24  1600 06/18/24  1420 09/23/19  2245 09/23/19  1534 05/15/19  0940 05/08/19  0504 05/07/19  0451   WBC 6.2 7.8 3.7*   < > 4.6   < > 6.3 6.6   HGB 11.0* 12.7* 11.8*   < > 10.2*   < > 9.0* 8.4*   MCV 85 82 86   < > 82   < > 94 97   * 167 151   < > 290   < > 372 336   INR  --   --   --   --  1.17*  --  1.22* 1.33*    < > = values in this interval not displayed.        LIVER AND PANCREAS PANEL   Recent Labs   Lab Test 09/21/24  0759 09/20/24  1751 09/20/24  1735 06/18/24  1420 03/04/24  1519 03/04/24  1505 09/29/23  1546 02/23/23  0920 09/24/19  0435 09/23/19  1534 04/29/19  1031 04/29/19  0911 03/26/19  0531  03/18/19  0505 02/23/19  1921 02/23/19  1835   ALBUMIN 3.5 3.7  --  4.1  --  4.2   < >  --    < > 4.0   < > 3.5   < > 2.6*   < > 3.4   BILITOTAL 0.7 0.5  --   --   --  0.2   < >  --    < > 0.4   < > 1.0   < > 0.8   < > 1.6*   ALT 25 25  --   --   --  27   < >  --    < > 17   < > 17   < > 57   < > 15   AST 30 24  --   --   --  24   < >  --    < > 16   < > 10   < > 36   < > 21   PROTEIN  --   --  600*  --  >=300*  --   --  300*   < >  --    < >  --    < >  --    < >  --    LIPASE  --   --   --   --   --   --   --   --   --  151  --  42*  --  237  --   --    AMYLASE  --   --   --   --   --   --   --   --   --   --   --   --   --  51  --  22*    < > = values in this interval not displayed.       RELEVANT IMAGING:   CT abdomen pelvis done earlier today however results are pending.     I have reviewed the current diagnostic and laboratory tests.     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    Recommendations/plan:  1.  Anemia with dark-colored stools  - His hemoglobin is pretty much at his baseline.  With Pepto-Bismol and iron being on board reliability of black-colored stools is questionable.  - I am going to order Hemoccult stool and if it comes back positive then we will plan on doing EGD on him.  - Will make him n.p.o. at midnight tentatively.  - Given that he is already on a PPI, chances of this being gastritis, esophagitis or peptic ulcer is low.    2.  History of ulcerative colitis  - Patient receiving all his care at the [facility].  He gets colonoscopies every 3 years.    3.  History of Pierce's esophagus  - Patient is already on PPI.  He gets upper endoscopies at the [facility] every 3 years.    4.  Acute on chronic anemia  - Multifactorial.  - Hemoglobin  is above transfusion threshold    5.  Status post heart transplant, constipation, osteoporosis, congestive heart failure  - Management as per primary team.    Thank you for the consult.    Slick Juarez MD  [facility]  [phone]      This document was created using voice recognition technology. Please excuse any typographical errors that may have occurred, and call with any questions.        Slick Juarez MD, FACP   [facility]  [email or web address]       Thank you for the opportunity to participate in the care of this patient.   Please feel free to call with any questions or concerns.  [phone].       CC: [facility]

## 2024-09-21 NOTE — PHARMACY-VANCOMYCIN DOSING SERVICE
"Pharmacy Vancomycin Initial Note  Date of Service 2024  Patient's  1965  59 year old, male    Indication:  fever of unknown origin    Current estimated CrCl = Estimated Creatinine Clearance: 31.8 mL/min (A) (based on SCr of 2.58 mg/dL (H)).    Creatinine for last 3 days  2024:  4:00 PM Creatinine 2.58 mg/dL    Recent Vancomycin Level(s) for last 3 days  No results found for requested labs within last 3 days.      Vancomycin IV Administrations (past 72 hours)                     vancomycin (VANCOCIN) 1,750 mg in sodium chloride 0.9 % 500 mL intermittent infusion (mg) 1,750 mg Given 24 1904                    Nephrotoxins and other renal medications (From now, onward)      Start     Dose/Rate Route Frequency Ordered Stop    24 0000  piperacillin-tazobactam (ZOSYN) 3.375 g vial to attach to  mL bag        Note to Pharmacy: For [facility], [facility] and [facility]: For Zosyn-naive patients, use the \"Zosyn initial dose + extended infusion\" order panel.    3.375 g  over 240 Minutes Intravenous EVERY 8 HOURS 24 1800  vancomycin (VANCOCIN) 1,750 mg in sodium chloride 0.9 % 500 mL intermittent infusion         20 mg/kg × 86.6 kg  over 2 Hours Intravenous ONCE 24 1744              Contrast Orders - past 72 hours (72h ago, onward)      None            [employer] Prediction of Planned Initial Vancomycin Regimen  Loading dose: 1750 mg IV x1 given 2024 @ 19:04 over 2 hrs  Regimen: 1000 mg IV every 24 hours.  Start time: 19:04 on 2024  Exposure target: AUC24 (range)400-600 mg/L.hr   AUC24,ss: 559 mg/L.hr  Probability of AUC24 > 400: 83 %  Ctrough,ss: 18.8 mg/L  Probability of Ctrough,ss > 20: 44 %  Probability of nephrotoxicity (Lodise RASHEEDA ): 15 %        Plan:  Start vancomycin 1000 mg IV q24h.   Vancomycin monitoring method: AUC  Vancomycin therapeutic monitoring goal: 400-600 mg*h/L  Pharmacy will check vancomycin levels as appropriate in 1-3 Days.    Serum " creatinine levels will be ordered daily for the first week of therapy and at least twice weekly for subsequent weeks.      Nicollette McMann, RPH

## 2024-09-21 NOTE — PROGRESS NOTES
[facility]    PROGRESS NOTE - Hospitalist Service    Assessment and Plan  59 year old male with a complex past medical history of atrial fibrillation, ulcerative colitis, hypothyroidism, heart transplant and history of ASD s/p repair as a child who presented to the emergency room with fever and is admitted on 9/20/2024 with pneumonia     Pneumonia with sepsis  -Patient has persistent fever  -Patient is immunocompromised and on immunosuppressant drugs.   - lactate normal at 0.9, procal mildly elevated and CRP very elevated   - UA not concerning for infection; noting ketones, proteinuria and blood  - CXR unrevealing and viral panel negative   - Negative strep and legionella Ag  - CT scan of chest shows left lower lobe pneumonia  - C-diff, enteric panel, cryptosporidium and microsporida are negative  - continue empiric IV antibiotic with vanc and zosyn for now plus azithromycin for atypical coverage  - ID consult, appreciate input  - Check TTE, if issues noted will need transfer to [place] per Dr. Lemos of transplant team   -Check sputum culture     Melena and hematochezia  - patient states he has noticed dark stool and blood in stool over last few days, no abdominal pain  -GI consulted, question possible scope and bx to assess for ?CMV colitis   - Continue to monitor him globin     Hematuria  -UA as noted for hematuria.  -urology consult, appreciate input  -Plan for noncontrast CT of abdomen pelvis as per urology.     Anion gap acidosis  Hyponatremia  -likely 2/2 to CKD history and starvation ketosis as pt not eating much.   - Lactate normal. UA with ketones   - continue fluids and encourage oral intake  -Improving  - recheck with AM labs      A-fib s/p ablation  -mildly elevated BNP with normal troponin, not hypervolemic on exam  -holding aspirin because of hematuria  -Continue home medications.  -Continue to monitor on telemetry     Acute kidney injury   -Baseline CKD stage  III  -Creatinine is above baseline on admission  -Improving with IV fluid, plan to discontinue when diet is advanced  -Continue to hold losartan for now    Chronic anemia  -Of chronic illness  -hgb stable  -Continue to monitor hemoglobin     Hx of heart transplant  Ulcerative colitis  -continue PTA sirolimus, dose reduce azathioprine to 50mg   -TTE as noted above  - GI consulted, appreciate    Acute on chronic hyponatremia  -Because of dehydration  -Improving with IV fluid.  -Continue to monitor sodium level.       50 MINUTES SPENT BY ME on the date of service doing chart review, history, exam, documentation & further activities per the note    Active Problems:    Transplanted heart (H)    MATA (dyspnea on exertion)    Tachycardia    History of heart transplant (H)    Fever, unspecified fever cause    Other fatigue      VTE prophylaxis:  Pneumatic Compression Devices  DIET: Orders Placed This Encounter      NPO for Medical/Clinical Reasons Except for: Meds, Ice Chips      Disposition/Barriers to discharge: IV antibiotic, pending culture, sputum discharge 3 to 4 days  Code Status: Full Code    Subjective:  [place] is feeling about the same today, still has some shortness of breath and coughing.  Denies any chest pain.    PHYSICAL EXAM  Vitals:    09/20/24 1537 09/20/24 2053 09/21/24 0613   Weight: 86.6 kg (191 lb) 86.1 kg (189 lb 14.4 oz) 86.8 kg (191 lb 7 oz)     B/P:155/87 T:100.4 P:117 R:22     Intake/Output Summary (Last 24 hours) at 9/21/2024 1356  Last data filed at 9/21/2024 1026  Gross per 24 hour   Intake 1720 ml   Output 200 ml   Net 1520 ml      Body mass index is 29.11 kg/m .    Constitutional: awake, alert, cooperative, no apparent distress, and appears stated age  Respiratory: No increased work of breathing, good air exchange, clear to auscultation bilaterally, no crackles or wheezing  Cardiovascular: Normal apical impulse, regular rate and rhythm, normal S1 and S2, no S3 or S4, and no murmur noted  GI: No  scars, normal bowel sounds, soft, non-distended, non-tender, no masses palpated, no hepatosplenomegally  Skin: no bruising or bleeding and normal skin color, texture, turgor  Musculoskeletal: There is no redness, warmth, or swelling of the joints.  Full range of motion noted.  no lower extremity pitting edema present  Neurologic: Awake, alert, oriented to name, place and time.  Cranial nerves II-XII are grossly intact.  Motor is 5 out of 5 bilaterally.   Sensory is intact.    Neuropsychiatric: Appropriate with examiner      PERTINENT LABS/IMAGING:    I have personally reviewed the following data over the past 24 hrs:    6.2  \   11.0 (L)   / 142 (L)     130 (L) 98 24.4 (H) /  102 (H)   3.8 18 (L) 2.40 (H) \     ALT: 25 AST: 30 AP: 129 TBILI: 0.7   ALB: 3.5 TOT PROTEIN: 7.1 LIPASE: N/A     Trop: 13 BNP: 1,445 (H)     Procal: 0.64 (H) CRP: 218.30 (H) Lactic Acid: 0.9         Imaging results reviewed over the past 24 hrs:   Recent Results (from the past 24 hour(s))   XR Chest Port 1 View    Narrative    EXAM: XR CHEST PORT 1 VIEW  LOCATION: [facility]  DATE: 09/20/2024    INDICATION: Short of breath.  COMPARISON: None.      Impression    IMPRESSION: Sternotomy. Heart size within normal limits. Stable soft tissue prominence in the upper mediastinum. Old right rib fracture. Clips in the left axilla. No pulmonary infiltrates.   Chest CT w/o contrast    Narrative    EXAM: CT CHEST W/O CONTRAST  LOCATION: [facility]  DATE: 9/20/2024    INDICATION: Fever, shortness of breath.   COMPARISON: Chest 10/20/2022.   TECHNIQUE: CT chest without IV contrast. Multiplanar reformats were obtained. Dose reduction techniques were used.  CONTRAST: None.    FINDINGS:     LUNGS AND PLEURA: Consolidative opacities with air bronchograms in the left lower lobe. Scattered punctate calcified granulomas in the right lower lobe. A 3 mm nodule in the right upper lobe (4/#136) is new since the prior  exam. A 2 mm left upper lobe   nodule at the apex (4/#45) is unchanged.     MEDIASTINUM/AXILLAE: No enlarged thoracic lymph node. Postoperative changes from heart transplantation with a left brachiocephalic vein stent. Patency of the stent is not assessed without contrast material. No pericardial effusion. Surgical clips within   the left axilla and mediastinum.     CORONARY ARTERY CALCIFICATION: None.    UPPER ABDOMEN: Small left hepatic cyst. Unchanged small benign right adrenal myelolipoma.     MUSCULOSKELETAL: Median sternotomy wires. Unchanged L1 compression deformity. Multilevel degenerative changes of the spine. No acute bony abnormalities.       Impression    IMPRESSION:     1.  Left lower lobe pneumonia.    2.  A 3 mm right upper lobe nodule is new since 10/28/2022. See follow-up guidelines below.     REFERENCE:  Guidelines for Management of Incidental Pulmonary Nodules Detected on CT Images: From the Fleischner Society 2017.   Guidelines apply to incidental nodules in patients who are 35 years or older.  Guidelines do not apply to lung cancer screening, patients with immunosuppression, or patients with known primary cancer.    SINGLE NODULE  Nodule size <6 mm  Low-risk patients: No follow-up needed.  High-risk patients: Optional follow-up at 12 months.       Discussed with patient, family, cardiology, ID, nursing staff and discharge planner    Roly Kimble MD  [facility]  [phone]

## 2024-09-21 NOTE — PLAN OF CARE
Problem: Adult Inpatient Plan of Care  Goal: Plan of Care Review  Description: The Plan of Care Review/Shift note should be completed every shift.  The Outcome Evaluation is a brief statement about your assessment that the patient is improving, declining, or no change.  This information will be displayed automatically on your shift  note.  Outcome: Progressing     Problem: Adult Inpatient Plan of Care  Goal: Absence of Hospital-Acquired Illness or Injury  Intervention: Identify and Manage Fall Risk  Recent Flowsheet Documentation  Taken 9/20/2024 2100 by Christa Merritt RN  Safety Promotion/Fall Prevention:   clutter free environment maintained   lighting adjusted   nonskid shoes/slippers when out of bed   patient and family education   room near nurse's station   room organization consistent   safety round/check completed     Problem: Adult Inpatient Plan of Care  Goal: Optimal Comfort and Wellbeing  Outcome: Progressing   Goal Outcome Evaluation:    A & O x 4, VSS, on RA, sinus tach. Denied pain, reports some SOB with activity. Independent in room. NPO at midnight, GI and nephrology consults placed.

## 2024-09-21 NOTE — PHARMACY-ADMISSION MEDICATION HISTORY
Pharmacist Admission Medication History    Admission medication history is complete. The information provided in this note is only as accurate as the sources available at the time of the update.    Information Source(s): Patient and CareEverywhere/SureScripts via in-person    Pertinent Information:   --Patient takes 2 immunosuppressants: azathioprine (dose temporarily reduced due to illness) and sirolimus.  --Patient takes 3 different inhaled corticosteroids (Flovent HFA, Flovent Diskus, and Asmanex) as well as PRN albuterol inhaler.    Changes made to PTA medication list:  Added: None  Deleted: None  Changed: Os-rahat, melatonin, Restasis    Allergies reviewed with patient and updates made in EHR: yes    Medication History Completed By: Dena Grayson [facility] 9/20/2024 8:07 PM    PTA Med List   Medication Sig Note Last Dose    acetaminophen (TYLENOL) 325 MG tablet Take 2 tablets (650 mg) by mouth every 4 hours as needed for mild pain  Unknown    albuterol (PROAIR HFA/PROVENTIL HFA/VENTOLIN HFA) 108 (90 Base) MCG/ACT inhaler Inhale 2 puffs into the lungs every 6 hours as needed for shortness of breath or wheezing  Unknown    amLODIPine (NORVASC) 5 MG tablet Take 2 tablets (10 mg) by mouth daily  9/20/2024    aspirin (ASA) 81 MG chewable tablet Take 1 tablet (81 mg) by mouth daily  9/20/2024    azaTHIOprine 75 MG TABS Take 75 mg by mouth daily 9/20/2024: A few days ago, patient was instructed to decrease azathioprine dose from 75 mg to 50 mg during illness. 9/20/2024 at 50 mg    calcium carbonate (OS-RAHAT) 500 MG tablet Take 1 tablet by mouth daily.  9/20/2024    cetirizine (ZYRTEC) 10 MG tablet Take 10 mg by mouth daily  9/20/2024    clindamycin (CLINDAMAX) 1 % external gel Apply twice daily as needed for acne on the spots  More than a month    DULoxetine (CYMBALTA) 20 MG capsule Take 1 capsule (20 mg) by mouth daily  9/20/2024    fluticasone (FLOVENT HFA) 220 MCG/ACT inhaler INHALE 2 PUFFS INTO THE LUNGS TWICE A DAY   9/20/2024    Fluticasone Propionate, Inhal, (FLUTICASONE PROPIONATE DISKUS) 100 MCG/ACT AEPB INHALE 1 PUFF INTO THE LUNGS EVERY 12 HOURS.  9/20/2024    levothyroxine (SYNTHROID/LEVOTHROID) 75 MCG tablet Take 1 tablet (75 mcg) by mouth daily  9/20/2024    losartan (COZAAR) 25 MG tablet Take 3 tablets (75 mg) by mouth daily  9/20/2024    melatonin 3 MG tablet Take 3 mg by mouth nightly as needed for sleep.  Unknown    mometasone furoate (ASMANEX HFA) 100 MCG/ACT inhaler INHALE 2 PUFFS INTO THE LUNGS TWICE A DAY  9/20/2024    multivitamin w/minerals (THERA-VIT-M) tablet Take 1 tablet by mouth daily  9/20/2024    omeprazole (PRILOSEC) 40 MG DR capsule TAKE 1 CAPSULE BY MOUTH EVERY DAY  9/20/2024    RESTASIS 0.05 % ophthalmic emulsion Place 1 drop into both eyes 2 times daily as needed for dry eyes.  Unknown    rosuvastatin (CRESTOR) 40 MG tablet Take 1 tablet (40 mg) by mouth daily  9/20/2024    sildenafil (VIAGRA) 50 MG tablet Take 1 tablet (50 mg) by mouth daily as needed (Erectile dysfunction)  Unknown    sirolimus (GENERIC EQUIVALENT) 1 MG tablet Take 2 tablets (2 mg) by mouth daily  9/19/2024 at PM    tadalafil (CIALIS) 10 MG tablet Take 1 tablet (10 mg) by mouth daily as needed (Erectile dysfunction)  Unknown

## 2024-09-21 NOTE — CONSULTS
[facility] Inpatient Consultation Note    Everton Larios MRN# [id number]   Age: 59 year old YOB: 1965     Date of Admission:  2024    Reason for consult: Asymptomatic microhematuria       Requesting physician: Dr Morillo                History of Present Illness:     The patient is a 59-year-old male with a history of heart transplant who was admitted for persistent left SVC syndrome.  He was noted to have microhematuria on urinalysis yesterday.  UA showed 25-50 red blood cells per high-power field.  He had a urinalysis 6 months ago showing 3-5 red blood cells per high-power field.    He has no urinary symptoms.  He denies any urinary frequency urgency dysuria abdominal pain.  He has never had gross hematuria.  No history of kidney stones.    No family history of  malignancies.       Past Medical History:     Past Medical History:   Diagnosis Date    Alcohol abuse     Arthritis     hands, neck    ASD (atrial septal defect)     s/p repair age 3    Asthma     Atrial fibrillation (H)     Pierce's esophagus 10/4/2018    Pierce's esophagus     Cataract     CHF (congestive heart failure) (H)     Chronic rhinitis 10/4/2018    Chronic systolic heart failure (H) 10/4/2018    Clotting disorder (H24)     Congenital cardiomyopathy in  (H)     Depression 10/4/2018    Depression     Diastolic dysfunction     DJD (degenerative joint disease)     neck    DJD (degenerative joint disease) - neck 10/4/2018    H/O congenital atrial septal defect (ASD) repair at age 3 10/4/2018    History of anesthesia complications     nausea    History of transfusion     Hypothyroidism     Hypothyroidism due to medication 10/4/2018    ICD (implantable cardioverter-defibrillator) in place     ICD, [place] [employer] ; gen change 2018 10/4/2018    Intermittent asthma without complication 10/4/2018    Nonischemic cardiomyopathy (H) 10/4/2018    On amiodarone therapy 10/4/2018    Pacemaker     Paroxysmal atrial  fibrillation (H) 10/4/2018    S/P ablation of atrial fibrillation 10/4/2018    Systolic heart failure (H)     Ulcerative colitis (H) 2005    Ulcerative colitis (H)     Ventricular tachycardia (H) 10/4/2018    Ventricular tachycardia (H)              Past Surgical History:     Past Surgical History:   Procedure Laterality Date    ABLATION OF DYSRHYTHMIC FOCUS      for A fib    AICD, DUAL CHAMBER      ASD REPAIR  01/01/1968    BRONCHOSCOPY (RIGID OR FLEXIBLE), DIAGNOSTIC N/A 04/30/2019    Procedure: BRONCHOSCOPY, WITH BAL;  Surgeon: Margot Chiang MD;  Location: [facility]    CARDIAC DEFIBRILLATOR PLACEMENT      x2--last was Feb 2018    COLONOSCOPY N/A 02/20/2020    Procedure: COLONOSCOPY, WITH POLYPECTOMY AND BIOPSY;  Surgeon: Ranjeet Cooper MD;  Location: [facility]    COLONOSCOPY N/A 02/05/2015    Procedure: COLONOSCOPY with biopsy;  Surgeon: Fernie Akers MD;  Location: [facility];  Service:     COLONOSCOPY N/A 12/19/2017    Procedure: COLONOSCOPY with biopsies and polypectomies using snare;  Surgeon: Gael Romero MD;  Location: [facility];  Service:     COLONOSCOPY N/A 3/8/2022    Procedure: COLONOSCOPY, WITH POLYPECTOMY AND BIOPSY;  Surgeon: Paddy Saldivar DO;  Location:  GI    CV CORONARY ANGIOGRAM N/A 02/12/2020    Procedure: CV CORONARY ANGIOGRAM;  Surgeon: Isiah Mcallister MD;  Location: [facility] CARDIAC CATH LAB    CV CORONARY ANGIOGRAM N/A 03/17/2021    Procedure: CV CORONARY ANGIOGRAM;  Surgeon: Santino Mckeon MD;  Location:  HEART CARDIAC CATH LAB    CV CORONARY ANGIOGRAM N/A 2/23/2022    Procedure: CV CORONARY ANGIOGRAM;  Surgeon: Yves Rodrigues MD;  Location:  HEART CARDIAC CATH LAB    CV CORONARY ANGIOGRAM N/A 2/23/2023    Procedure: Coronary Angiogram;  Surgeon: Santino Mckeon MD;  Location: [facility] CARDIAC CATH LAB    CV HEART BIOPSY N/A 03/04/2019    Procedure: Heart Biopsy;  Surgeon: Abhijeet Titus MD;  Location: [facility] CARDIAC CATH LAB    CV HEART  BIOPSY N/A 03/25/2019    Procedure: Heart Biopsy;  Surgeon: Yves Rodrigues MD;  [facility] CARD[facility]AC CATH LAB    CV HEART BIOPSY N/A 03/28/2019    Procedure: Heart Cath Heart Biopsy;  Surgeon: Yves Rodrigues MD;  [facility] CARDIAC CATH LAB    CV HEART BIOPSY N/A 04/10/2019    Procedure: CV HEART BIOPSY;  Surgeon: Isiah Mcallister MD;  [facility] CARD[facility]AC [facility]    CV HEART BIOPSY N/A 04/24/2019    Procedure: CV HEART BIOPSY;  Surgeon: Isiah Mcallister MD;  [facility] CARDIAC CATH [facility]    CV HEART BIOPSY N/A 05/08/2019    Procedure: CV HEART BIOPSY;  Surgeon: Isiah Mcallister MD;  [facility] CARD[facility]    CV HEART BIOPSY N/A 06/04/2019    Procedure: CV HEART BIOPSY;  Surgeon: Alton Solomon MD;  [facility]    CV HEART BIOPSY N/A 05/22/2019    Procedure: CV HEART BIOPSY;  Surgeon: Yves Rodrigues MD;  [facility] C[facility]    CV HEART BIOPSY N/A 07/17/2019    Procedure: CV HEART BIOPSY;  Surgeon: Isiah Mcallister MD;  [facility] CARD[facility]    CV HEART BIOPSY N/A 08/13/2019    Procedure: CV HEART BIOPSY;  Surgeon: Jackson Patten MD;  [facility]    CV HEART BIOPSY N/A 10/15/2019    Procedure: CV HEART BIOPSY;  Surgeon: Santino Mckeon MD;  [facility]    CV HEART BIOPSY N/A 02/12/2020    Procedure: CV HEART BIOPSY;  Surgeon: Isiah Mcallister MD;  [facility] C[facility]    CV HEART BIOPSY N/A 05/05/2020    Procedure: CV HEART BIOPSY;  Surgeon: Yves Rodrigues MD;  [facility]    CV HEART BIOPSY N/A 03/17/2021    Procedure: CV HEART BIOPSY;  Surgeon: Santino Mckeon MD;  [facility]    CV HEART BIOPSY N/A 5/10/2022    Procedure: Heart Biopsy;  Surgeon: Yves Rodrigues MD;  Location:  HEART CARDIAC CATH LAB    CV INTRA AORTIC BALLOON N/A 02/18/2019     Procedure: Intra-Aortic Balloon;  Surgeon: Alton Solomon MD;  Location:  HEART CARDIAC CATH LAB    CV INTRA AORTIC BALLOON N/A 02/20/2019    Procedure: Replace subclavian IABP;  Surgeon: Yves Rodrigues MD;  Location:  HEART CARDIAC CATH LAB    CV INTRAVASULAR ULTRASOUND N/A 02/12/2020    Procedure: CV INTRAVASCULAR ULTRASOUND;  Surgeon: Isiah Mcallister MD;  Location:  HEART CARDIAC CATH LAB    CV LEFT HEART CATH N/A 03/19/2019    Procedure: Left Heart Cath;  Surgeon: Yves Rodrigues MD;  Location:  HEART CARDIAC CATH LAB    CV LEFT HEART CATH N/A 02/12/2020    Procedure: Left Heart Cath;  Surgeon: Isiah Mcallister MD;  Location:  HEART CARDIAC CATH LAB    CV MYOCARDIAL BIOPSY N/A 03/19/2019    Procedure: RHC/HBx - Femoral access for 3/19 per Nimisha GONZALEZ;  Surgeon: Yves Rodrigues MD;  Location:  HEART CARDIAC CATH LAB    CV MYOCARDIAL BIOPSY N/A 03/11/2019    Procedure: ADD ON RHC/HBX;  Surgeon: Alton Solomon MD;  Location:  HEART CARDIAC CATH LAB    CV RIGHT HEART CATH MEASUREMENTS RECORDED N/A 03/25/2019    Procedure: Right Heart Cath;  Surgeon: Yves Rodrigues MD;  Location:  HEART CARDIAC CATH LAB    CV RIGHT HEART CATH MEASUREMENTS RECORDED N/A 03/28/2019    Procedure: Heart Cath Right Heart Cath;  Surgeon: Yves Rodrigues MD;  Location:  HEART CARDIAC CATH LAB    CV RIGHT HEART CATH MEASUREMENTS RECORDED N/A 04/24/2019    Procedure: CV RIGHT HEART CATH;  Surgeon: Isiah Mcallister MD;  Location:  HEART CARDIAC CATH LAB    CV RIGHT HEART CATH MEASUREMENTS RECORDED N/A 06/04/2019    Procedure: CV RIGHT HEART CATH;  Surgeon: Alton Solomon MD;  Location:  HEART CARDIAC CATH LAB    CV RIGHT HEART CATH MEASUREMENTS RECORDED N/A 05/22/2019    Procedure: CV RIGHT HEART CATH;  Surgeon: Yves Rodrigues MD;  Location:  HEART CARDIAC CATH LAB    CV RIGHT HEART CATH MEASUREMENTS RECORDED N/A 08/13/2019    Procedure: CV RIGHT HEART CATH;   Surgeon: Jackson Patten MD;  Location:  HEART CARDIAC CATH LAB    CV RIGHT HEART CATH MEASUREMENTS RECORDED N/A 10/15/2019    Procedure: CV RIGHT HEART CATH;  Surgeon: Santino Mckeon MD;  Location:  HEART CARDIAC CATH LAB    CV RIGHT HEART CATH MEASUREMENTS RECORDED N/A 02/12/2020    Procedure: CV RIGHT HEART CATH;  Surgeon: Isiah Mcallister MD;  Location:  HEART CARDIAC CATH LAB    CV RIGHT HEART CATH MEASUREMENTS RECORDED N/A 03/17/2021    Procedure: CV RIGHT HEART CATH;  Surgeon: Santino Mckeon MD;  Location:  HEART CARDIAC CATH LAB    CV RIGHT HEART CATH MEASUREMENTS RECORDED N/A 2/23/2022    Procedure: CV RIGHT HEART CATH;  Surgeon: Yves Rodrigues MD;  Location:  HEART CARDIAC CATH LAB    CV RIGHT HEART CATH MEASUREMENTS RECORDED N/A 5/10/2022    Procedure: Right Heart Catheterization;  Surgeon: Yves Rodrigues MD;  Location:  HEART CARDIAC CATH LAB    CV RIGHT HEART CATH MEASUREMENTS RECORDED N/A 2/23/2023    Procedure: Right Heart Catheterization;  Surgeon: Santino Mckeon MD;  Location:  HEART CARDIAC CATH LAB    ESOPHAGOSCOPY, GASTROSCOPY, DUODENOSCOPY (EGD), COMBINED N/A 12/19/2017    Procedure: ESOPHAGOGASTRODUODENOSCOPY (EGD) with biopsies;  Surgeon: Gael Romero MD;  Location: [facility];  Service:     ESOPHAGOSCOPY, GASTROSCOPY, DUODENOSCOPY (EGD), COMBINED N/A 3/8/2022    Procedure: ESOPHAGOGASTRODUODENOSCOPY, WITH BIOPSY;  Surgeon: Paddy Saldivar DO;  Location: [facility] STATISTIC PICC LINE INSERTION >5YR, FAILED Bilateral 10/28/2021    L sided SVC    HAND SURGERY Left     HC REVISE MEDIAN N/CARPAL TUNNEL SURG  09/21/2016    Procedure: OPEN RIGHT CARPAL TUNNEL RELEASE CORTISONE INJECTION RIGHT THUMB;  Surgeon: Duong Santoyo MD;  Location: [facility] OR;  Service: Orthopedics    INCISION AND DRAINAGE CHEST WASHOUT, COMBINED N/A 03/21/2019    Procedure: Incision And Drainage; Evacuation Right Chest Wall Hematoma;  Surgeon: Dewayne House  MD Ck;  [facility]    INSERT INTRAAORTIC BALLOON PUMP Left 02/19/2019    Procedure: Insert left  Subclavian Balloon Pump,  Removal Right femoral arterial balloom pump sheath;  Surgeon: Dewayne House MD;  [facility]    INSERT INTRAAORTIC BALLOON PUMP Left 02/21/2019    Procedure: SUBCLAVIAN BALLOON PUMP PLACEMENT;  Surgeon: Ben White MD;  [facility]    INSERT INTRACORONARY STENT      x2    IR PICC PLACEMENT > 5 YRS OF AGE  05/08/2019    TRANSPLANT HEART RECIPIENT N/A 02/24/2019    Procedure: TRANSPLANT HEART RECIPIENT;  Surgeon: Dewayne House MD;  [facility]             Social History:     Social History     Socioeconomic History    Marital status: Single     Spouse name: Not on file    Number of children: Not on file    Years of education: Not on file    Highest education level: Not on file   Occupational History    Not on file   Tobacco Use    Smoking status: Former     Current packs/day: 0.00     Average packs/day: 1 pack/day for 28.8 years (28.8 ttl pk-yrs)     Types: Cigarettes     Start date: 1/1/1980     Quit date: 11/6/2008     Years since quitting: 15.8     Passive exposure: Past    Smokeless tobacco: Never   Vaping Use    Vaping status: Never Used   Substance and Sexual Activity    Alcohol use: Yes     Alcohol/week: 1.0 - 2.0 standard drink of alcohol     Types: 1 - 2 Cans of beer per week    Drug use: No    Sexual activity: Not on file   Other Topics Concern    Not on file   Social History Narrative    Everton is a retired . He lives by himself in a townhouse in [place]. He has no lawn care responsibilities. He will be staying with his folks during his post-hospital early transplant care time. No history of TB exposures.     He works part-time at [employer].     Social Determinants of Health     Financial Resource Strain: Low Risk  (11/8/2023)    Financial Resource Strain     Within the past 12 months, have you or your family members you live with  been unable to get utilities (heat, electricity) when it was really needed?: No   Food Insecurity: Low Risk  (11/8/2023)    Food Insecurity     Within the past 12 months, did you worry that your food would run out before you got money to buy more?: No     Within the past 12 months, did the food you bought just not last and you didn t have money to get more?: No   Transportation Needs: Low Risk  (11/8/2023)    Transportation Needs     Within the past 12 months, has lack of transportation kept you from medical appointments, getting your medicines, non-medical meetings or appointments, work, or from getting things that you need?: No   Physical Activity: Not on file   Stress: Not on file   Social Connections: Not on file   Interpersonal Safety: Low Risk  (9/21/2024)    Interpersonal Safety     Do you feel physically and emotionally safe where you currently live?: Yes     Within the past 12 months, have you been hit, slapped, kicked or otherwise physically hurt by someone?: No     Within the past 12 months, have you been humiliated or emotionally abused in other ways by your partner or ex-partner?: No   Housing Stability: Low Risk  (11/8/2023)    Housing Stability     Do you have housing? : Yes     Are you worried about losing your housing?: No             Family History:     Family History   Problem Relation Age of Onset    Skin Cancer Mother     Endometrial Cancer Mother     Osteoporosis Mother     Hypertension Father     Endometrial Cancer Maternal Grandmother     Hip fracture No family hx of     Nephrolithiasis No family hx of              Immunizations:     Immunization History   Administered Date(s) Administered    COVID-19 12+ (Pfizer) 09/17/2023, 05/12/2024, 09/08/2024    COVID-19 Bivalent 12+ (Pfizer) 11/05/2022, 05/07/2023    COVID-19 MONOVALENT 12+ (Pfizer) 03/19/2021, 04/09/2021, 08/21/2021    COVID-19 Monovalent 12+ (Pfizer 2022) 04/02/2022    Influenza (IIV3) PF 10/22/2008, 09/25/2009, 11/07/2011,  11/30/2012, 10/27/2013, 10/02/2014    Influenza Vaccine >6 months,quad, PF 10/02/2015, 09/20/2019, 09/16/2020, 09/28/2021, 11/12/2022    Influenza Vaccine, 6+MO IM (QUADRIVALENT W/PRESERVATIVES) 09/12/2016, 10/16/2017, 09/20/2018, 09/20/2019    Influenza,INJ,MDCK,PF,Quad >6mo(Flucelvax) 09/17/2023    OPV, trivalent, live 10/20/1978    Pneumo Conj 13-V (2010&after) 09/26/2018    Pneumococcal 23 valent 09/28/2019    TDAP Vaccine (Boostrix) 07/20/2012    Td (Adult), Adsorbed 10/20/1978             Allergies:     Allergies   Allergen Reactions    Hydromorphone Other (See Comments)     Significant Delirium    Adhesive Tape Blisters     Tegaderm causes bruises, blisters and extreme irritation.    Lisinopril Other (See Comments)     hypotension    Quinolones Other (See Comments)     Would not rx quinolones until QTc interval improved.     Tobramycin Other (See Comments)     Would not use aminoglycosides as his kidney function is very borderline    Codeine Nausea             Medications:     Current Facility-Administered Medications   Medication Dose Route Frequency Provider Last Rate Last Admin    acetaminophen (TYLENOL) tablet 650 mg  650 mg Oral Q4H PRN Ronn Morillo, DO   650 mg at 09/21/24 0808    Or    acetaminophen (TYLENOL) Suppository 650 mg  650 mg Rectal Q4H PRN Morillo, Ronn, DO        albuterol (PROVENTIL HFA/VENTOLIN HFA) inhaler  2 puff Inhalation Q6H PRN MorilloIsaccRonn, DO        amLODIPine (NORVASC) tablet 10 mg  10 mg Oral Daily MorilloIsaccRonn, DO        [Held by provider] aspirin (ASA) chewable tablet 81 mg  81 mg Oral Daily MorilloRonn rangel, DO        azaTHIOprine (IMURAN) tablet 50 mg  50 mg Oral Daily Morillo, Ronn, DO   50 mg at 09/21/24 0808    azithromycin (ZITHROMAX) 500 mg in sodium chloride 0.9 % 250 mL intermittent infusion  500 mg Intravenous Q24H MorilloRonn rangel, DO 0 mL/hr at 09/20/24 2151 Restarted at 09/21/24 0121    calcium carbonate (TUMS) chewable tablet 1,000 mg  1,000 mg Oral 4x  Daily PRN Morillo, Ronn, DO        cetirizine (zyrTEC) tablet 10 mg  10 mg Oral Daily Morillo, Ronn, DO        DULoxetine (CYMBALTA) DR capsule 20 mg  20 mg Oral Daily Morillo, Ronn, DO        fluticasone (ARNUITY ELLIPTA) 200 MCG/ACT inhaler 1 puff  1 puff Inhalation Daily Morillo, Ronn, DO        hydrALAZINE (APRESOLINE) tablet 10 mg  10 mg Oral Q4H PRN Morillo, Ronn, DO        Or    hydrALAZINE (APRESOLINE) injection 10 mg  10 mg Intravenous Q4H PRN Morillo, Ronn, DO        levothyroxine (SYNTHROID/LEVOTHROID) tablet 75 mcg  75 mcg Oral QAM AC Morillo, Ronn, DO   75 mcg at 09/21/24 0615    lidocaine (LMX4) cream   Topical Q1H PRN Morillo, Ronn, DO        lidocaine (LMX4) cream   Topical Q1H PRN Roly Kimble MD        lidocaine 1 % 0.1-1 mL  0.1-1 mL Other Q1H PRN Morillo, Ronn, DO        lidocaine 1 % 0.1-5 mL  0.1-5 mL Other Q1H PRN Roly Kimble MD        [Held by provider] losartan (COZAAR) tablet 75 mg  75 mg Oral Daily Morillo, Ronn, DO        melatonin tablet 5 mg  5 mg Oral At Bedtime PRN Morillo, Ronn, DO   5 mg at 09/21/24 0053    mometasone furoate (ASMANEX HFA) 100 MCG/ACT inhaler 2 puff  2 puff Inhalation BID Morillo, Ronn, DO   2 puff at 09/20/24 2144    ondansetron (ZOFRAN ODT) ODT tab 4 mg  4 mg Oral Q6H PRN Morillo, Ronn, DO        Or    ondansetron (ZOFRAN) injection 4 mg  4 mg Intravenous Q6H PRN Morillo, Ronn, DO        pantoprazole (PROTONIX) EC tablet 40 mg  40 mg Oral BID AC Morillo, Ronn, DO   40 mg at 09/21/24 0615    piperacillin-tazobactam (ZOSYN) 3.375 g vial to attach to  mL bag  3.375 g Intravenous Q8H Ronn Morillo DO   3.375 g at 09/21/24 1026    polyethylene glycol (MIRALAX) Packet 17 g  17 g Oral BID PRN Ronn Morillo DO        rosuvastatin (CRESTOR) tablet 40 mg  40 mg Oral Daily Ronn Morillo DO        senna-docusate (SENOKOT-S/PERICOLACE) 8.6-50 MG per tablet 1 tablet  1 tablet Oral BID PRN Ronn Morillo DO        Or     "senna-docusate (SENOKOT-S/PERICOLACE) 8.6-50 MG per tablet 2 tablet  2 tablet Oral BID PRN Morillo Ronn, DO        sirolimus (GENERIC EQUIVALENT) tablet 2 mg  2 mg Oral QPM Denny Novak MD   2 mg at 09/21/24 0053    sodium chloride (PF) 0.9% PF flush 10-40 mL  10-40 mL Intracatheter Once PRN Roly Kimble MD        sodium chloride (PF) 0.9% PF flush 3 mL  3 mL Intracatheter Q8H Morillo, Ronn, DO        sodium chloride (PF) 0.9% PF flush 3 mL  3 mL Intracatheter q1 min prn Morillo Ronn, DO        sodium chloride 0.9 % infusion   Intravenous Continuous Morillo, Ronn,  mL/hr at 09/21/24 1026 New Bag at 09/21/24 1026    vancomycin (VANCOCIN) 1,000 mg in 200 mL dextrose intermittent infusion  1,000 mg Intravenous Q24H Morillo Ronn, DO                 Review of Systems:   A full 12 point review of symptoms is negative except for that noted in the HPI     Examination:  BP (!) 162/92   Pulse (!) 125   Temp 99.7  F (37.6  C) (Axillary)   Resp 22   Ht 1.727 m (5' 8\")   Wt 86.8 kg (191 lb 7 oz)   SpO2 96%   BMI 29.11 kg/m    General: Alert and oriented, no distress  HEENT: Face symmetric, mucous membranes moist and pink  Eyes: No scleral icterus  Neck: Symmetric  Chest wall: Symmetric  Respiratory: Breathing unlabored, no audible wheezing  Cardiac: Extremities warm and well perfused, no edema  Abdomen: soft, non tender, non distended; no rebound, guarding or peritoneal signs  Back: No CVA or flank tenderness  : Penis circumcised with no lesions  Testes atrophic bilaterally  DARIO 40 g no nodules  Extremities: No evidence of deformities or trauma  Neuro:Grossly non focal  Pysch: Normal mood and affect  Skin: No evident rashes or lesions            Data:     Lab Results   Component Value Date    WBC 6.2 09/21/2024    WBC 5.0 04/09/2021     Lab Results   Component Value Date    RBC 3.87 09/21/2024    RBC 4.61 04/09/2021     Lab Results   Component Value Date    HGB 11.0 09/21/2024    HGB 11.2 " 04/09/2021     Lab Results   Component Value Date    HCT 32.7 09/21/2024    HCT 34.6 04/09/2021     Lab Results   Component Value Date     09/21/2024     04/09/2021     Creatinine   Date Value Ref Range Status   09/21/2024 2.40 (H) 0.67 - 1.17 mg/dL Final   03/17/2021 1.80 (H) 0.66 - 1.25 mg/dL Final   ]  Lab Results   Component Value Date    BUN 24.4 09/21/2024    BUN 31 05/28/2022    BUN 32 03/17/2021       Imaging: N/A    Impression:  Asymptomatic microhematuria and 59-year-old male with a history of heart transplant, persistent SVC syndrome, chronic kidney disease with a creatinine of 2.58 yesterday    Plan:  I will order noncontrast CT scan of the abdomen and pelvis today.  Please obtain an MR urogram prior to discharge, when his cardiac issues are stabilized.   He will need follow-up cystoscopy as an outpatient for completion of the evaluation.  We will sign off, please call with questions    Dave Lemus MD  [facility] ([facility])  Pager [phone]

## 2024-09-21 NOTE — ED NOTES
[facility] ED Handoff Report    ED Chief Complaint: Fever, SOB, Lethargy     ED Diagnosis:  (Z94.1) Transplanted heart (H)      (R50.9) Fever, unspecified fever cause    (R06.09) MATA (dyspnea on exertion)      (R00.0) Tachycardia      (R53.83) Other fatigue      (Z94.1) History of heart transplant (H)         PMH:    Past Medical History:   Diagnosis Date    Alcohol abuse     Arthritis     hands, neck    ASD (atrial septal defect)     s/p repair age 3    Asthma     Atrial fibrillation (H)     Pierce's esophagus 10/4/2018    Pierce's esophagus     Cataract     CHF (congestive heart failure) (H)     Chronic rhinitis 10/4/2018    Chronic systolic heart failure (H) 10/4/2018    Clotting disorder (H24)     Congenital cardiomyopathy in  (H)     Depression 10/4/2018    Depression     Diastolic dysfunction     DJD (degenerative joint disease)     neck    DJD (degenerative joint disease) - neck 10/4/2018    H/O congenital atrial septal defect (ASD) repair at age 3 10/4/2018    History of anesthesia complications     nausea    History of transfusion     Hypothyroidism     Hypothyroidism due to medication 10/4/2018    ICD (implantable cardioverter-defibrillator) in place     ICD, [employer] scientific ; gen change 2018 10/4/2018    Intermittent asthma without complication 10/4/2018    Nonischemic cardiomyopathy (H) 10/4/2018    On amiodarone therapy 10/4/2018    Pacemaker     Paroxysmal atrial fibrillation (H) 10/4/2018    S/P ablation of atrial fibrillation 10/4/2018    Systolic heart failure (H)     Ulcerative colitis (H)     Ulcerative colitis (H)     Ventricular tachycardia (H) 10/4/2018    Ventricular tachycardia (H)         Code Status:  Full Code     Falls Risk: No Band: Not applicable    Current Living Situation/Residence: lives alone and lives in a townhouse     Elimination Status: Continent: Yes     Activity Level: SBA    Patients Preferred Language:  English     Needed: No    Vital  Signs:  BP (!) 145/74   Pulse 114   Temp 98.5  F (36.9  C) (Oral)   Resp 26   Wt 86.6 kg (191 lb)   SpO2 97%   BMI 29.65 kg/m       Cardiac Rhythm:    Pain Score: 0/10    Is the Patient Confused:  No    Last Food or Drink: 09/20/24 at Drank in ER, Ate last this morning     Focused Assessment:  Increased SOB with exertion, Fever, Lethargy. Afebrile after tylenol in ER.     Tests Performed: Done: Labs and Imaging    Treatments Provided:  SEE MAR    Family Dynamics/Concerns: No      Medications sent with patient: NA    Covid: symptomatic , negative    Additional Information: Heart transplant February 2019, Rule out C-diff, 3 episodes of diarrhea today.     RN: Dena Shipley RN   9/20/2024 8:06 PM

## 2024-09-21 NOTE — PROGRESS NOTES
Transferred down in bed at 1345 to IR for PICC placement. Will go directly to CT for abd/pelvic CT without contrast after PICC procedure. RFA PIV infiltrated from NS and Zosyn. PIV removed and warm pack applied. PRN Tyl 650 mg given at 1301 for RFA pain.

## 2024-09-21 NOTE — PROCEDURES
PICC line ordered. According to vascular access note 5/8/19, the patient has persistent left SVC syndrome and PICC needs to be placed by IR under fluoroscopy. He had his last PICC placed in IR 5/8/19. The patient has a left brachiocephalic vein stent per chest CT 9/20/24. The patient also had a right sided pacemaker that has  been removed. This patient will be deferred to IR for PICC placement.

## 2024-09-21 NOTE — SEDATION DOCUMENTATION
Pt was laying on the procedure table and started to have a coughing fit, he sat up, vitals were obtained, and he said he started to feel better. Pt transported to CT, no coughing at the time, alert and interacting in plan of care. PICC line in place.

## 2024-09-21 NOTE — CONSULTS
Infectious Disease Consultation:  Requesting MD:  Reason for consult:      HISTORY:  Pt has had heart transplant (2019) and came to ED feeling run down, SOB with exertion and having fever for 48 hours.  CT lung:      Admitted and on broad spectrum abx.  Viral studies negative.            Pertinent past history, past infectious disease history:    PAST MEDICAL HISTORY:  Past Medical History        Past Medical History:   Diagnosis Date    Alcohol abuse      Arthritis       hands, neck    ASD (atrial septal defect)       s/p repair age 3    Asthma      Atrial fibrillation (H)      Pierce's esophagus 10/4/2018    Pierce's esophagus      Cataract      CHF (congestive heart failure) (H)      Chronic rhinitis 10/4/2018    Chronic systolic heart failure (H) 10/4/2018    Clotting disorder (H24)      Congenital cardiomyopathy in  (H)      Depression 10/4/2018    Depression      Diastolic dysfunction      DJD (degenerative joint disease)       neck    DJD (degenerative joint disease) - neck 10/4/2018    H/O congenital atrial septal defect (ASD) repair at age 3 10/4/2018    History of anesthesia complications       nausea    History of transfusion      Hypothyroidism      Hypothyroidism due to medication 10/4/2018    ICD (implantable cardioverter-defibrillator) in place      ICD, [employer] ; gen change 2018 10/4/2018    Intermittent asthma without complication 10/4/2018    Nonischemic cardiomyopathy (H) 10/4/2018    On amiodarone therapy 10/4/2018    Pacemaker      Paroxysmal atrial fibrillation (H) 10/4/2018    S/P ablation of atrial fibrillation 10/4/2018    Systolic heart failure (H)      Ulcerative colitis (H)     Ulcerative colitis (H)      Ventricular tachycardia (H) 10/4/2018    Ventricular tachycardia (H)              PAST SURGICAL HISTORY:  Past Surgical History         Past Surgical History:   Procedure Laterality Date    ABLATION OF DYSRHYTHMIC FOCUS         for A fib    AICD, DUAL  CHAMBER        ASD REPAIR   01/01/1968    BRONCHOSCOPY (RIGID OR FLEXIBLE), DIAGNOSTIC N/A 04/30/2019     Procedure: BRONCHOSCOPY, WITH BAL;  Surgeon: Margot Chiang MD;  Location:  GI    CARDIAC DEFIBRILLATOR PLACEMENT         x2--last was Feb 2018    COLONOSCOPY N/A 02/20/2020     Procedure: COLONOSCOPY, WITH POLYPECTOMY AND BIOPSY;  Surgeon: Ranjeet Cooper MD;  Location:  GI    COLONOSCOPY N/A 02/05/2015     Procedure: COLONOSCOPY with biopsy;  Surgeon: Fernie Akers MD;  Location: [facility];  Service:     COLONOSCOPY N/A 12/19/2017     Procedure: COLONOSCOPY with biopsies and polypectomies using snare;  Surgeon: Gael Romero MD;  Location: [facility];  Service:     COLONOSCOPY N/A 3/8/2022     Procedure: COLONOSCOPY, WITH POLYPECTOMY AND BIOPSY;  Surgeon: Padyd Saldivar DO;  Location:  GI    CV CORONARY ANGIOGRAM N/A 02/12/2020     Procedure: CV CORONARY ANGIOGRAM;  Surgeon: Isiah Mcallister MD;  Location:  HEART CARDIAC CATH LAB    CV CORONARY ANGIOGRAM N/A 03/17/2021     Procedure: CV CORONARY ANGIOGRAM;  Surgeon: Santino Mckeon MD;  Location:  HEART CARDIAC CATH LAB    CV CORONARY ANGIOGRAM N/A 2/23/2022     Procedure: CV CORONARY ANGIOGRAM;  Surgeon: Yves Rodrigues MD;  Location:  HEART CARDIAC CATH LAB    CV CORONARY ANGIOGRAM N/A 2/23/2023     Procedure: Coronary Angiogram;  Surgeon: Santino Mckeon MD;  Location:  HEART CARDIAC CATH LAB    CV HEART BIOPSY N/A 03/04/2019     Procedure: Heart Biopsy;  Surgeon: Abhijeet Titus MD;  [facility]    CV HEART BIOPSY N/A 03/25/2019     Procedure: Heart Biopsy;  Surgeon: Yves Rodrigues MD;  Location:  HE[facility] CARDI[facility] CATH LAB    CV HEART BIOPSY N/A 03/28/2019     Procedure: Heart Cath Heart Biopsy;  Surgeon: Yves Rodrigues MD;  [facility]    CV HEART BIOPSY N/A 04/10/2019     Procedure: CV HEART BIOPSY;  Surgeon: Isiah Mcallister MD;   Location:  HE[facility] CARDIAC CATH LAB    CV HEART BIOPSY N/A 04/24/2019     Procedure: CV HEART BIOPSY;  Surgeon: Isiah Mcallister MD;  Location:  HEART CARDIAC CATH LAB    CV HEART BIOPSY N/A 05/08/2019     Procedure: CV HEART BIOPSY;  Surgeon: Isiah Mcallister MD;  Location:  HE[facility] CARDIAC CATH LAB    CV HEART BIOPSY N/A 06/04/2019     Procedure: CV HEART BIOPSY;  Surgeon: Alton Solomon MD;  Location:  HEART CARDIAC CATH LAB    CV HEART BIOPSY N/A 05/22/2019     Procedure: CV HEART BIOPSY;  Surgeon: Yves Rodrigues MD;  Location:  HEART CARDIAC CATH LAB    CV HEART BIOPSY N/A 07/17/2019     Procedure: CV HEART BIOPSY;  Surgeon: Isiah Mcallister MD;  Location:  HEART CARDIAC CATH LAB    CV HEART BIOPSY N/A 08/13/2019     Procedure: CV HEART BIOPSY;  Surgeon: Jackson Patten MD;  Location:  HEART CARDIAC CATH LAB    CV HEART BIOPSY N/A 10/15/2019     Procedure: CV HEART BIOPSY;  Surgeon: Santino Mckeon MD;  Location:  HEART CARDIAC CATH LAB    CV HEART BIOPSY N/A 02/12/2020     Procedure: CV HEART BIOPSY;  Surgeon: Isiah Mcallister MD;  Location:  HEART CARDIAC CATH LAB    CV HEART BIOPSY N/A 05/05/2020     Procedure: CV HEART BIOPSY;  Surgeon: Yves Rodrigues MD;  Location:  HEART CARDIAC CATH LAB    CV HEART BIOPSY N/A 03/17/2021     Procedure: CV HEART BIOPSY;  Surgeon: Santino Mckeon MD;  Location:  HEART CARDIAC CATH LAB    CV HEART BIOPSY N/A 5/10/2022     Procedure: Heart Biopsy;  Surgeon: Yves Rodrigues MD;  Location:  HEART CARDIAC CATH LAB    CV INTRA AORTIC BALLOON N/A 02/18/2019     Procedure: Intra-Aortic Balloon;  Surgeon: Alton Solomon MD;  Location:  HEART CARDIAC CATH LAB    CV INTRA AORTIC BALLOON N/A 02/20/2019     Procedure: Replace subclavian IABP;  Surgeon: Yves Rodrigues MD;  Location:  HEART CARDIAC CATH LAB    CV INTRAVASULAR ULTRASOUND N/A 02/12/2020     Procedure: CV INTRAVASCULAR ULTRASOUND;   Surgeon: Isiah Mcallister MD;  [facility]    CV LEFT HEART CATH N/A 03/19/2019     Procedure: Left Heart Cath;  Surgeon: Yves Rodrigues MD;  [facility]    CV LEFT HEART CATH N/A 02/12/2020     Procedure: Left Heart Cath;  Surgeon: Isiah Mcallister MD;  [facility]    CV MYOCARDIAL BIOPSY N/A 03/19/2019     Procedure: RHC/HBx - Femoral access for 3/19 per Nimisha GONZALEZ;  Surgeon: Yves Rodrigues MD;  [facility]    CV MYOCARDIAL BIOPSY N/A 03/11/2019     Procedure: ADD ON RHC/HBX;  Surgeon: Alton Solomon MD;  Location:  HEART CARDIAC CATH LAB    CV RIGHT HEART CATH MEASUREMENTS RECORDED N/A 03/25/2019     Procedure: Right Heart Cath;  Surgeon: Yves Rodrigues MD;  Location:  HEART CARDIAC CATH LAB    CV RIGHT HEART CATH MEASUREMENTS RECORDED N/A 03/28/2019     Procedure: Heart Cath Right Heart Cath;  Surgeon: Yves Rodrigues MD;  [facility]    CV RIGHT HEART CATH MEASUREMENTS RECORDED N/A 04/24/2019     Procedure: CV RIGHT HEART CATH;  Surgeon: Isiah Mcallister MD;  Location:  HEART CARDIAC CATH LAB    CV RIGHT HEART CATH MEASUREMENTS RECORDED N/A 06/04/2019     Procedure: CV RIGHT HEART CATH;  Surgeon: Alton Solomon MD;  Location:  HEART CARDIAC CATH LAB    CV RIGHT HEART CATH MEASUREMENTS RECORDED N/A 05/22/2019     Procedure: CV RIGHT HEART CATH;  Surgeon: Yves Rodrigues MD;  Location:  HEART CARDIAC CATH LAB    CV RIGHT HEART CATH MEASUREMENTS RECORDED N/A 08/13/2019     Procedure: CV RIGHT HEART CATH;  Surgeon: Jackson Patten MD;  Location:  HEART CARDIAC CATH LAB    CV RIGHT HEART CATH MEASUREMENTS RECORDED N/A 10/15/2019     Procedure: CV RIGHT HEART CATH;  Surgeon: Santino Mckeon MD;  Location:  HEART CARDIAC CATH LAB    CV RIGHT HEART CATH MEASUREMENTS RECORDED N/A 02/12/2020     Procedure: CV RIGHT HEART CATH;   Surgeon: Isiah Mcallister MD;  Location:  HEART CARDIAC CATH LAB    CV RIGHT HEART CATH MEASUREMENTS RECORDED N/A 03/17/2021     Procedure: CV RIGHT HEART CATH;  Surgeon: Santino Mckeon MD;  Location:  HEART CARDIAC CATH LAB    CV RIGHT HEART CATH MEASUREMENTS RECORDED N/A 2/23/2022     Procedure: CV RIGHT HEART CATH;  Surgeon: Yves Rodrigues MD;  Location:  HEART CARDIAC CATH LAB    CV RIGHT HEART CATH MEASUREMENTS RECORDED N/A 5/10/2022     Procedure: Right Heart Catheterization;  Surgeon: Yves Rodrigues MD;  Location:  HEART CARDIAC CATH LAB    CV RIGHT HEART CATH MEASUREMENTS RECORDED N/A 2/23/2023     Procedure: Right Heart Catheterization;  Surgeon: Santino Mckeon MD;  Location:  HEART CARDIAC CATH LAB    ESOPHAGOSCOPY, GASTROSCOPY, DUODENOSCOPY (EGD), COMBINED N/A 12/19/2017     Procedure: ESOPHAGOGASTRODUODENOSCOPY (EGD) with biopsies;  Surgeon: Gael Romero MD;  Location: [facility];  Service:     ESOPHAGOSCOPY, GASTROSCOPY, DUODENOSCOPY (EGD), COMBINED N/A 3/8/2022     Procedure: ESOPHAGOGASTRODUODENOSCOPY, WITH BIOPSY;  Surgeon: Paddy Saldivar DO;  Location: [facility]    H STATISTIC PICC LINE INSERTION >5YR, FAILED Bilateral 10/28/2021     L sided SVC    HAND SURGERY Left      HC REVISE MEDIAN N/CARPAL TUNNEL SURG   09/21/2016     Procedure: OPEN RIGHT CARPAL TUNNEL RELEASE CORTISONE INJECTION RIGHT THUMB;  Surgeon: Duong Santoyo MD;  Location: [facility] OR;  Service: Orthopedics    INCISION AND DRAINAGE CHEST WASHOUT, COMBINED N/A 03/21/2019     Procedure: Incision And Drainage; Evacuation Right Chest Wall Hematoma;  Surgeon: Dewayne House MD;  Location:  OR    INSERT INTRAAORTIC BALLOON PUMP Left 02/19/2019     Procedure: Insert left  Subclavian Balloon Pump,  Removal Right femoral arterial balloom pump sheath;  Surgeon: Dewayne House MD;  Location:  OR    INSERT INTRAAORTIC BALLOON PUMP Left 02/21/2019     Procedure: SUBCLAVIAN BALLOON  PUMP PLACEMENT;  Surgeon: Ben White MD;  Location: [facility] OR    INSERT INTRACORONARY STENT         x2    IR PICC PLACEMENT > 5 YRS OF AGE   05/08/2019    TRANSPLANT HEART RECIPIENT N/A 02/24/2019     Procedure: TRANSPLANT HEART RECIPIENT;  Surgeon: Dewayne House MD;  Location: [facility] OR            CURRENT MEDICATIONS:      Current Medications      Current Facility-Administered Medications:     acetaminophen (TYLENOL) tablet 650 mg, 650 mg, Oral, Q4H PRN **OR** acetaminophen (TYLENOL) Suppository 650 mg, 650 mg, Rectal, Q4H PRN, Morillo, Ronn, DO    albuterol (PROVENTIL HFA/VENTOLIN HFA) inhaler, 2 puff, Inhalation, Q6H PRN, Morillo, Ronn, DO    [START ON 9/21/2024] amLODIPine (NORVASC) tablet 10 mg, 10 mg, Oral, Daily, Morillo, Ronn, DO    [Held by provider] aspirin (ASA) chewable tablet 81 mg, 81 mg, Oral, Daily, Morillo, Ronn, DO    [START ON 9/21/2024] azaTHIOprine (IMURAN) tablet 50 mg, 50 mg, Oral, Daily, Morillo, Ronn, DO    azithromycin (ZITHROMAX) 500 mg in sodium chloride 0.9 % 250 mL intermittent infusion, 500 mg, Intravenous, Q24H, Morillo, Ronn, DO, 500 mg at 09/20/24 2151    calcium carbonate (TUMS) chewable tablet 1,000 mg, 1,000 mg, Oral, 4x Daily PRN, Morillo, Ronn, DO    [START ON 9/21/2024] cetirizine (zyrTEC) tablet 10 mg, 10 mg, Oral, Daily, Morillo, Ronn, DO    [START ON 9/21/2024] DULoxetine (CYMBALTA) DR capsule 20 mg, 20 mg, Oral, Daily, Morillo, Ronn, DO    [START ON 9/21/2024] fluticasone (ARNUITY ELLIPTA) 200 MCG/ACT inhaler 1 puff, 1 puff, Inhalation, Daily, Morillo, Ronn, DO    hydrALAZINE (APRESOLINE) tablet 10 mg, 10 mg, Oral, Q4H PRN **OR** hydrALAZINE (APRESOLINE) injection 10 mg, 10 mg, Intravenous, Q4H PRN, Ronn Morillo DO    [START ON 9/21/2024] levothyroxine (SYNTHROID/LEVOTHROID) tablet 75 mcg, 75 mcg, Oral, QAM AC, Ronn Morillo DO    lidocaine (LMX4) cream, , Topical, Q1H PRN, Ronn Morillo DO    lidocaine 1 % 0.1-1 mL, 0.1-1 mL, Other, Q1H  PRN, Morillo, Ronn, DO    [Held by provider] losartan (COZAAR) tablet 75 mg, 75 mg, Oral, Daily, Morillo, Ronn, DO    melatonin tablet 5 mg, 5 mg, Oral, At Bedtime PRN, Morillo, Ronn, DO    mometasone furoate (ASMANEX HFA) 100 MCG/ACT inhaler 2 puff, 2 puff, Inhalation, BID, Morillo, Ronn, DO, 2 puff at 09/20/24 2144    ondansetron (ZOFRAN ODT) ODT tab 4 mg, 4 mg, Oral, Q6H PRN **OR** ondansetron (ZOFRAN) injection 4 mg, 4 mg, Intravenous, Q6H PRN, Morillo, Ronn, DO    [START ON 9/21/2024] pantoprazole (PROTONIX) EC tablet 40 mg, 40 mg, Oral, BID AC, Morillo, Ronn, DO    [START ON 9/21/2024] piperacillin-tazobactam (ZOSYN) 3.375 g vial to attach to  mL bag, 3.375 g, Intravenous, Q8H, Morillo, Ronn, DO    polyethylene glycol (MIRALAX) Packet 17 g, 17 g, Oral, BID PRN, Morillo, Ronn, DO    [START ON 9/21/2024] rosuvastatin (CRESTOR) tablet 40 mg, 40 mg, Oral, Daily, Morillo, Ronn, DO    senna-docusate (SENOKOT-S/PERICOLACE) 8.6-50 MG per tablet 1 tablet, 1 tablet, Oral, BID PRN **OR** senna-docusate (SENOKOT-S/PERICOLACE) 8.6-50 MG per tablet 2 tablet, 2 tablet, Oral, BID PRN, Morillo, Ronn, DO    [START ON 9/21/2024] sirolimus (GENERIC EQUIVALENT) tablet 2 mg, 2 mg, Oral, Daily, Morlilo, Rnon, DO    sodium chloride (PF) 0.9% PF flush 3 mL, 3 mL, Intracatheter, Q8H, Morillo, Ronn, DO    sodium chloride (PF) 0.9% PF flush 3 mL, 3 mL, Intracatheter, q1 min prn, Morillo, Ronn, DO    sodium chloride 0.9 % infusion, , Intravenous, Continuous, Morillo, Ronn, DO, Last Rate: 100 mL/hr at 09/20/24 2152, New Bag at 09/20/24 2152    [START ON 9/21/2024] vancomycin (VANCOCIN) 1,000 mg in 200 mL dextrose intermittent infusion, 1,000 mg, Intravenous, Q24H, Morillo, Ronn, DO        ALLERGIES:  Allergies         Allergies   Allergen Reactions    Hydromorphone Other (See Comments)       Significant Delirium    Adhesive Tape Blisters       Tegaderm causes bruises, blisters and extreme irritation.     "Lisinopril Other (See Comments)       hypotension    Quinolones Other (See Comments)       Would not rx quinolones until QTc interval improved.     Tobramycin Other (See Comments)       Would not use aminoglycosides as his kidney function is very borderline    Codeine Nausea            FAMILY HISTORY:  Family History         Family History   Problem Relation Age of Onset    Skin Cancer Mother      Endometrial Cancer Mother      Osteoporosis Mother      Hypertension Father      Endometrial Cancer Maternal Grandmother      Hip fracture No family hx of      Nephrolithiasis No family hx of              SOCIAL HISTORY:   Social History            Socioeconomic History    Marital status: Single   Tobacco Use    Smoking status: Former       Current packs/day: 0.00       Average packs/day: 1 pack/day for 28.8 years (28.8 ttl pk-yrs)       Types: Cigarettes       Start date: 1/1/1980       Quit date: 11/6/2008       Years since quitting: 15.8       Passive exposure: Past    Smokeless tobacco: Never   Vaping Use    Vaping status: Never Used   Substance and Sexual Activity    Alcohol use: Yes       Alcohol/week: 1.0 - 2.0 standard drink of alcohol       Types: 1 - 2 Cans of beer per week    Drug use: No   Social History Narrative     Everton is a retired . He lives by himself in a townhouse in [place]. He has no lawn care responsibilities. He will be staying with his folks during his post-hospital early transplant care time. No history of TB exposures.      He works part-time at [employer].        Medications:  Reviewed prior to admission meds as applicable in chart review.  Current meds are reviewed in the EMR listed MAR.     ANTIBIOTICS:    Current:   Prior:   Allergy to:    SH/FH and  travel history(if applicable to consult):    REVIEW OF SYSTEMS:  All other systems negative    EXAMINATION:  BP (!) 162/92   Pulse (!) 125   Temp 99.7  F (37.6  C) (Axillary)   Resp 22   Ht 1.727 m (5' 8\")   Wt 86.8 kg (191 lb " "7 oz)   SpO2 96%   BMI 29.11 kg/m    Alert, awake  Vitals tabulated above, reviewed  HEENT:nl  Neck supple without lymphadenopathy  Sclera clear  CARDIOVASCULAR regular rate and rhythm, no murmur  Lungs CLEAR TO AUSCULTATION   Abdomen soft, NT/ND, absent HEPATOSPLENOMEGALY  Skin normal  Joints normal  Neurologic exam non focal  Wound:  NA        CLINICAL DATABASE FOR---LAB/MICRO/CULTURES/IMAGING STUDIES:  Lab Results   Component Value Date    WBC 6.2 09/21/2024    WBC 5.0 04/09/2021     Lab Results   Component Value Date    RBC 3.87 09/21/2024    RBC 4.61 04/09/2021     Lab Results   Component Value Date    HGB 11.0 09/21/2024    HGB 11.2 04/09/2021     Lab Results   Component Value Date    HCT 32.7 09/21/2024    HCT 34.6 04/09/2021     No components found for: \"MCT\"  Lab Results   Component Value Date    MCV 85 09/21/2024    MCV 75 04/09/2021     Lab Results   Component Value Date    MCH 28.4 09/21/2024    MCH 24.3 04/09/2021     Lab Results   Component Value Date    MCHC 33.6 09/21/2024    MCHC 32.4 04/09/2021     Lab Results   Component Value Date    RDW 14.0 09/21/2024    RDW 14.9 04/09/2021     Lab Results   Component Value Date     09/21/2024     04/09/2021       Last Comprehensive Metabolic Panel:  Sodium   Date Value Ref Range Status   09/21/2024 130 (L) 135 - 145 mmol/L Final   03/17/2021 137 133 - 144 mmol/L Final     Potassium   Date Value Ref Range Status   09/21/2024 3.8 3.4 - 5.3 mmol/L Final   05/28/2022 4.4 3.4 - 5.3 mmol/L Final   03/17/2021 3.9 3.4 - 5.3 mmol/L Final     Chloride   Date Value Ref Range Status   09/21/2024 98 98 - 107 mmol/L Final   05/28/2022 107 94 - 109 mmol/L Final   03/17/2021 106 94 - 109 mmol/L Final     Carbon Dioxide   Date Value Ref Range Status   03/17/2021 24 20 - 32 mmol/L Final     Carbon Dioxide (CO2)   Date Value Ref Range Status   09/21/2024 18 (L) 22 - 29 mmol/L Final   05/28/2022 24 20 - 32 mmol/L Final     Anion Gap   Date Value Ref Range Status " "  09/21/2024 14 7 - 15 mmol/L Final   05/28/2022 6 3 - 14 mmol/L Final   03/17/2021 8 3 - 14 mmol/L Final     Glucose   Date Value Ref Range Status   09/21/2024 102 (H) 70 - 99 mg/dL Final   05/28/2022 97 70 - 99 mg/dL Final   03/17/2021 85 70 - 99 mg/dL Final     Urea Nitrogen   Date Value Ref Range Status   09/21/2024 24.4 (H) 8.0 - 23.0 mg/dL Final   05/28/2022 31 (H) 7 - 30 mg/dL Final   03/17/2021 32 (H) 7 - 30 mg/dL Final     Creatinine   Date Value Ref Range Status   09/21/2024 2.40 (H) 0.67 - 1.17 mg/dL Final   03/17/2021 1.80 (H) 0.66 - 1.25 mg/dL Final     GFR Estimate   Date Value Ref Range Status   09/21/2024 30 (L) >60 mL/min/1.73m2 Final     Comment:     eGFR calculated using 2021 CKD-EPI equation.   06/22/2021 34 (L) >60 mL/min/1.73m2 Final   03/17/2021 41 (L) >60 mL/min/[1.73_m2] Final     Comment:     Non  GFR Calc  Starting 12/18/2018, serum creatinine based estimated GFR (eGFR) will be   calculated using the Chronic Kidney Disease Epidemiology Collaboration   (CKD-EPI) equation.       Calcium   Date Value Ref Range Status   09/21/2024 7.8 (L) 8.8 - 10.4 mg/dL Final     Comment:     Reference intervals for this test were updated on 7/16/2024 to reflect our healthy population more accurately. There may be differences in the flagging of prior results with similar values performed with this method. Those prior results can be interpreted in the context of the updated reference intervals.   03/17/2021 9.0 8.5 - 10.1 mg/dL Final       Liver Function Studies -   Recent Labs   Lab Test 09/21/24  0759   PROTTOTAL 7.1   ALBUMIN 3.5   BILITOTAL 0.7   ALKPHOS 129   AST 30   ALT 25       Sed Rate   Date Value Ref Range Status   09/23/2019 17 0 - 20 mm/h Final                   IMPRESSION:  LLL PNA in hi risk host (5 yrs post OHT)--fever awful high for \"pneumonia\"      PLAN:  Ok with present abx  Atypicals also covered by macrolide.      SIL CARCAMO MD  [facility]  Office [phone]

## 2024-09-22 LAB
1,3 BETA GLUCAN SER-MCNC: <31 PG/ML
ALBUMIN SERPL BCG-MCNC: 3 G/DL (ref 3.5–5.2)
ALP SERPL-CCNC: 115 U/L (ref 40–150)
ALT SERPL W P-5'-P-CCNC: 30 U/L (ref 0–70)
ANION GAP SERPL CALCULATED.3IONS-SCNC: 14 MMOL/L (ref 7–15)
AST SERPL W P-5'-P-CCNC: 44 U/L (ref 0–45)
BACTERIA SPT CULT: NORMAL
BILIRUB SERPL-MCNC: 0.5 MG/DL
BUN SERPL-MCNC: 21.6 MG/DL (ref 8–23)
CALCIUM SERPL-MCNC: 7.2 MG/DL (ref 8.8–10.4)
CHLORIDE SERPL-SCNC: 102 MMOL/L (ref 98–107)
CREAT SERPL-MCNC: 2.22 MG/DL (ref 0.67–1.17)
CRP SERPL-MCNC: 246.9 MG/L
EGFRCR SERPLBLD CKD-EPI 2021: 33 ML/MIN/1.73M2
ERYTHROCYTE [DISTWIDTH] IN BLOOD BY AUTOMATED COUNT: 14 % (ref 10–15)
GLUCOSE SERPL-MCNC: 91 MG/DL (ref 70–99)
GRAM STAIN RESULT: NORMAL
HCO3 SERPL-SCNC: 16 MMOL/L (ref 22–29)
HCT VFR BLD AUTO: 30.2 % (ref 40–53)
HGB BLD-MCNC: 10.3 G/DL (ref 13.3–17.7)
L PNEUMO1 AG UR QL IA: NEGATIVE
LDH SERPL L TO P-CCNC: 326 U/L (ref 0–250)
MAGNESIUM SERPL-MCNC: 1.7 MG/DL (ref 1.7–2.3)
MCH RBC QN AUTO: 27.8 PG (ref 26.5–33)
MCHC RBC AUTO-ENTMCNC: 34.1 G/DL (ref 31.5–36.5)
MCV RBC AUTO: 82 FL (ref 78–100)
OBSERVATION IMP: NEGATIVE
PLATELET # BLD AUTO: 131 10E3/UL (ref 150–450)
POTASSIUM SERPL-SCNC: 3 MMOL/L (ref 3.4–5.3)
POTASSIUM SERPL-SCNC: 3.1 MMOL/L (ref 3.4–5.3)
PROCALCITONIN SERPL IA-MCNC: 1.69 NG/ML
PROT SERPL-MCNC: 6.5 G/DL (ref 6.4–8.3)
RBC # BLD AUTO: 3.7 10E6/UL (ref 4.4–5.9)
S PNEUM AG SPEC QL: NEGATIVE
SODIUM SERPL-SCNC: 132 MMOL/L (ref 135–145)
SPECIMEN TYPE: NORMAL
WBC # BLD AUTO: 4.6 10E3/UL (ref 4–11)

## 2024-09-22 PROCEDURE — 250N000012 HC RX MED GY IP 250 OP 636 PS 637: Performed by: INTERNAL MEDICINE

## 2024-09-22 PROCEDURE — 87899 AGENT NOS ASSAY W/OPTIC: CPT | Performed by: INTERNAL MEDICINE

## 2024-09-22 PROCEDURE — 87205 SMEAR GRAM STAIN: CPT | Performed by: INTERNAL MEDICINE

## 2024-09-22 PROCEDURE — 83735 ASSAY OF MAGNESIUM: CPT | Performed by: INTERNAL MEDICINE

## 2024-09-22 PROCEDURE — 83010 ASSAY OF HAPTOGLOBIN QUANT: CPT | Performed by: INTERNAL MEDICINE

## 2024-09-22 PROCEDURE — 83615 LACTATE (LD) (LDH) ENZYME: CPT | Performed by: INTERNAL MEDICINE

## 2024-09-22 PROCEDURE — 84132 ASSAY OF SERUM POTASSIUM: CPT | Performed by: INTERNAL MEDICINE

## 2024-09-22 PROCEDURE — 99233 SBSQ HOSP IP/OBS HIGH 50: CPT | Performed by: INTERNAL MEDICINE

## 2024-09-22 PROCEDURE — 84145 PROCALCITONIN (PCT): CPT | Performed by: INTERNAL MEDICINE

## 2024-09-22 PROCEDURE — 120N000004 HC R&B MS OVERFLOW

## 2024-09-22 PROCEDURE — 258N000003 HC RX IP 258 OP 636: Performed by: STUDENT IN AN ORGANIZED HEALTH CARE EDUCATION/TRAINING PROGRAM

## 2024-09-22 PROCEDURE — 99232 SBSQ HOSP IP/OBS MODERATE 35: CPT | Performed by: INTERNAL MEDICINE

## 2024-09-22 PROCEDURE — 80053 COMPREHEN METABOLIC PANEL: CPT | Performed by: INTERNAL MEDICINE

## 2024-09-22 PROCEDURE — 250N000011 HC RX IP 250 OP 636: Performed by: STUDENT IN AN ORGANIZED HEALTH CARE EDUCATION/TRAINING PROGRAM

## 2024-09-22 PROCEDURE — 86140 C-REACTIVE PROTEIN: CPT | Performed by: INTERNAL MEDICINE

## 2024-09-22 PROCEDURE — 250N000013 HC RX MED GY IP 250 OP 250 PS 637: Performed by: STUDENT IN AN ORGANIZED HEALTH CARE EDUCATION/TRAINING PROGRAM

## 2024-09-22 PROCEDURE — 85027 COMPLETE CBC AUTOMATED: CPT | Performed by: INTERNAL MEDICINE

## 2024-09-22 PROCEDURE — 250N000012 HC RX MED GY IP 250 OP 636 PS 637: Performed by: STUDENT IN AN ORGANIZED HEALTH CARE EDUCATION/TRAINING PROGRAM

## 2024-09-22 PROCEDURE — 250N000011 HC RX IP 250 OP 636: Performed by: INTERNAL MEDICINE

## 2024-09-22 RX ORDER — POTASSIUM CHLORIDE 7.45 MG/ML
10 INJECTION INTRAVENOUS
Status: COMPLETED | OUTPATIENT
Start: 2024-09-22 | End: 2024-09-22

## 2024-09-22 RX ORDER — POTASSIUM CHLORIDE 7.45 MG/ML
10 INJECTION INTRAVENOUS
Status: COMPLETED | OUTPATIENT
Start: 2024-09-22 | End: 2024-09-23

## 2024-09-22 RX ADMIN — POTASSIUM CHLORIDE 10 MEQ: 7.46 INJECTION, SOLUTION INTRAVENOUS at 08:39

## 2024-09-22 RX ADMIN — ROSUVASTATIN CALCIUM 40 MG: 40 TABLET, FILM COATED ORAL at 08:02

## 2024-09-22 RX ADMIN — POTASSIUM CHLORIDE 10 MEQ: 7.46 INJECTION, SOLUTION INTRAVENOUS at 22:09

## 2024-09-22 RX ADMIN — Medication 5 MG: at 20:04

## 2024-09-22 RX ADMIN — LEVOTHYROXINE SODIUM 75 MCG: 0.03 TABLET ORAL at 06:32

## 2024-09-22 RX ADMIN — PIPERACILLIN AND TAZOBACTAM 3.38 G: 3; .375 INJECTION, POWDER, FOR SOLUTION INTRAVENOUS at 03:03

## 2024-09-22 RX ADMIN — SIROLIMUS 2 MG: 1 TABLET, SUGAR COATED ORAL at 16:09

## 2024-09-22 RX ADMIN — DULOXETINE HYDROCHLORIDE 20 MG: 20 CAPSULE, DELAYED RELEASE ORAL at 08:00

## 2024-09-22 RX ADMIN — PIPERACILLIN AND TAZOBACTAM 3.38 G: 3; .375 INJECTION, POWDER, FOR SOLUTION INTRAVENOUS at 17:45

## 2024-09-22 RX ADMIN — MOMETASONE FUROATE 2 PUFF: 100 AEROSOL RESPIRATORY (INHALATION) at 19:16

## 2024-09-22 RX ADMIN — FLUTICASONE FUROATE 1 PUFF: 200 POWDER RESPIRATORY (INHALATION) at 08:06

## 2024-09-22 RX ADMIN — AZITHROMYCIN MONOHYDRATE 500 MG: 500 INJECTION, POWDER, LYOPHILIZED, FOR SOLUTION INTRAVENOUS at 20:25

## 2024-09-22 RX ADMIN — AMLODIPINE BESYLATE 10 MG: 5 TABLET ORAL at 08:00

## 2024-09-22 RX ADMIN — CETIRIZINE HYDROCHLORIDE 10 MG: 10 TABLET, FILM COATED ORAL at 08:02

## 2024-09-22 RX ADMIN — PANTOPRAZOLE SODIUM 40 MG: 40 TABLET, DELAYED RELEASE ORAL at 06:32

## 2024-09-22 RX ADMIN — ACETAMINOPHEN 650 MG: 325 TABLET ORAL at 00:17

## 2024-09-22 RX ADMIN — POTASSIUM CHLORIDE 10 MEQ: 7.46 INJECTION, SOLUTION INTRAVENOUS at 10:57

## 2024-09-22 RX ADMIN — POTASSIUM CHLORIDE 10 MEQ: 7.46 INJECTION, SOLUTION INTRAVENOUS at 09:42

## 2024-09-22 RX ADMIN — SODIUM CHLORIDE: 9 INJECTION, SOLUTION INTRAVENOUS at 08:39

## 2024-09-22 RX ADMIN — POTASSIUM CHLORIDE 10 MEQ: 7.46 INJECTION, SOLUTION INTRAVENOUS at 14:35

## 2024-09-22 RX ADMIN — ACETAMINOPHEN 650 MG: 325 TABLET ORAL at 16:07

## 2024-09-22 RX ADMIN — PIPERACILLIN AND TAZOBACTAM 3.38 G: 3; .375 INJECTION, POWDER, FOR SOLUTION INTRAVENOUS at 10:57

## 2024-09-22 RX ADMIN — MOMETASONE FUROATE 2 PUFF: 100 AEROSOL RESPIRATORY (INHALATION) at 08:06

## 2024-09-22 RX ADMIN — AZATHIOPRINE 50 MG: 50 TABLET ORAL at 08:02

## 2024-09-22 RX ADMIN — PANTOPRAZOLE SODIUM 40 MG: 40 TABLET, DELAYED RELEASE ORAL at 16:07

## 2024-09-22 RX ADMIN — POTASSIUM CHLORIDE 10 MEQ: 7.46 INJECTION, SOLUTION INTRAVENOUS at 21:01

## 2024-09-22 RX ADMIN — ACETAMINOPHEN 650 MG: 325 TABLET ORAL at 20:04

## 2024-09-22 RX ADMIN — POTASSIUM CHLORIDE 10 MEQ: 7.46 INJECTION, SOLUTION INTRAVENOUS at 19:55

## 2024-09-22 RX ADMIN — VANCOMYCIN HYDROCHLORIDE 1000 MG: 1 INJECTION, SOLUTION INTRAVENOUS at 19:11

## 2024-09-22 RX ADMIN — POTASSIUM CHLORIDE 10 MEQ: 7.46 INJECTION, SOLUTION INTRAVENOUS at 13:24

## 2024-09-22 RX ADMIN — ACETAMINOPHEN 650 MG: 325 TABLET ORAL at 08:02

## 2024-09-22 RX ADMIN — POTASSIUM CHLORIDE 10 MEQ: 7.46 INJECTION, SOLUTION INTRAVENOUS at 23:18

## 2024-09-22 RX ADMIN — POTASSIUM CHLORIDE 10 MEQ: 7.46 INJECTION, SOLUTION INTRAVENOUS at 12:14

## 2024-09-22 ASSESSMENT — ACTIVITIES OF DAILY LIVING (ADL)
ADLS_ACUITY_SCORE: 22
ADLS_ACUITY_SCORE: 23
ADLS_ACUITY_SCORE: 22
ADLS_ACUITY_SCORE: 23
ADLS_ACUITY_SCORE: 23
ADLS_ACUITY_SCORE: 22
ADLS_ACUITY_SCORE: 22
ADLS_ACUITY_SCORE: 23
ADLS_ACUITY_SCORE: 22

## 2024-09-22 NOTE — PLAN OF CARE
Problem: Adult Inpatient Plan of Care  Goal: Plan of Care Review  Description: The Plan of Care Review/Shift note should be completed every shift.  The Outcome Evaluation is a brief statement about your assessment that the patient is improving, declining, or no change.  This information will be displayed automatically on your shift  note.  Outcome: Progressing     Problem: Adult Inpatient Plan of Care  Goal: Optimal Comfort and Wellbeing  Outcome: Progressing   Goal Outcome Evaluation:    VSS, sinus tach, on RA. Pt had a temp of 103 degrees this evening, tylenol administered. MD aware that pt still having persistent fever. Denied pain. Stool sample sent for occult blood. NPO at midnight for possible EGD tomorrow pending results of stool sample. Pleasant and cooperative.

## 2024-09-22 NOTE — PLAN OF CARE
"Problem: Adult Inpatient Plan of Care  Goal: Plan of Care Review  Outcome: Progressing  Flowsheets (Taken 9/22/2024 1444)  Plan of Care Reviewed With: patient  Overall Patient Progress: improving  POC reviewed with patient, verbalized understanding.   ml/hr discontinued this afternoon. Drinking plenty of water throughout the day. Fair appetite. Na 132.      Problem: Diarrhea  Goal: Effective Diarrhea Management  Outcome: Progressing  Intervention: Manage Diarrhea  Recent Flowsheet Documentation  Taken 9/22/2024 1221 by Bebe Christian, RN  Medication Review/Management: medications reviewed  Taken 9/22/2024 0802 by Bebe Christian, RN  Medication Review/Management: medications reviewed  Chronic diarrhea. Occasional leakage/incontinence. Independent with ADL's.   K 3.0. RN managed K protocol added today. IV replacement per patient request, \"The potassium pills make me gag.\" Recheck K at 1715.  Mg 1.7.  Fecal occult neg. Cardiac diet resumed this morning. GI following.   No evidence of black, red or coffee ground stools.   Denies n/v.   BS active, flatus +.  C-diff neg.   Abd/pelvic CT unremarkable.   Hx of ulcerative cholitis.       Problem: Infection  Goal: Absence of Infection Signs and Symptoms  Outcome: Progressing  Still has intermittent fevers.   101.6 axillary this morning. PRN Tyl given. Most recent oral temp 98.8.  ID following.   Continuing IV Zosyn, IV Vanco and IV Zithromax. Vanco trough in am.   BC NGTD, awaiting final result.  Sputum sample sent this am, however, ID lab could not process. Discussed with Dr. Moreno face-to-face. Will collect Legionella Urinary Antigen and Streptococcus pneumoniae antigen this afternoon. Sterile specimen cup placed in patient bathroom. Patient will notify staff when he has a urine sample ready.  More frequent coughing today, non-productive, loose.  .9  Procal 1.69  WBC 4.6      Problem: Pain Acute  Goal: Optimal Pain Control and Function  Outcome: " Progressing  Intervention: Develop Pain Management Plan  Recent Flowsheet Documentation  Taken 9/22/2024 0802 by Bebe Christian RN  Pain Management Interventions:   medication (see MAR)   cold applied   heat applied  Intervention: Prevent or Manage Pain  Recent Flowsheet Documentation  Taken 9/22/2024 1221 by Bebe Christian RN  Sensory Stimulation Regulation:   care clustered   lighting decreased   quiet environment promoted  Medication Review/Management: medications reviewed  Taken 9/22/2024 0802 by Bebe Christian RN  Sensory Stimulation Regulation:   care clustered   lighting decreased   quiet environment promoted  Medication Review/Management: medications reviewed  PRN Tylenol effective for c/o sciatic nerve pain. Alternated between ice pack and warm pack today. He reports it is getting a little better as the day goes on. CMS BLE intact.       Problem: Chronic Kidney Disease  Goal: Minimize Renal Failure Effects  Outcome: Progressing  Intervention: Monitor and Support Renal Function  Recent Flowsheet Documentation  Taken 9/22/2024 1221 by Bebe Christian RN  Medication Review/Management: medications reviewed  Taken 9/22/2024 0802 by Bebe Christian RN  Medication Review/Management: medications reviewed   Cr 2.22. Losartan remains on hold.       Problem: Pneumonia  Goal: Effective Oxygenation and Ventilation  Outcome: Progressing  Intervention: Promote Airway Secretion Clearance  Recent Flowsheet Documentation  Taken 9/22/2024 1221 by Bebe Christian RN  Breathing Techniques/Airway Clearance: deep/controlled cough encouraged  Cough And Deep Breathing: done independently per patient  Taken 9/22/2024 0802 by Bebe Christian RN  Breathing Techniques/Airway Clearance: deep/controlled cough encouraged  Cough And Deep Breathing: done independently per patient  Intervention: Optimize Oxygenation and Ventilation  Recent Flowsheet Documentation  Taken 9/22/2024 1221 by Bebe Christian RN  Airway/Ventilation Management:  pulmonary hygiene promoted  Head of Bed (HOB) Positioning: HOB at 15 degrees  Taken 9/22/2024 0802 by Bebe Christian, RN  Airway/Ventilation Management: pulmonary hygiene promoted  Head of Bed (HOB) Positioning: HOB at 20 degrees  SpO2 >90% RA.  Mildy tachypneic today, unlabored, resting comfortably in bed. Able to ambulate to bathroom independently, some MATA.   Posterior LLL fine crackles, otherwise clear bilaterally.    Hgb 10.3.    Problem: Cardiac Output Decreased  Goal: Effective Cardiac Output  Outcome: Progressing  Intervention: Optimize Cardiac Output  Recent Flowsheet Documentation  Taken 9/22/2024 1221 by Bebe Christian, RN  Head of Bed (HOB) Positioning: HOB at 15 degrees  Taken 9/22/2024 0802 by Bebe Christian, RN  Head of Bed (HOB) Positioning: HOB at 20 degrees  Tele remains 's bpm at rest. Denies CP, dizziness, palpitations.    BP Readings from Last 6 Encounters:   09/22/24 133/64   04/18/24 (!) 151/93   03/07/24 114/72   02/21/24 139/87   11/08/23 130/82   05/24/23 120/78           Goal Outcome Evaluation:      Plan of Care Reviewed With: patient    Overall Patient Progress: improvingOverall Patient Progress: improving

## 2024-09-22 NOTE — PROGRESS NOTES
"[facility] Progress Note    SUBJECTIVE:  cough sounds wet, but a sample set did not satisfy dx criteria.  I am going to check legionella given the low NA.  Fevers better but not near normal yet.        REVIEW OF SYSTEMS:  Negative unless as listed above.  Social history, Family history, Medications: reviewed.    OBJECTIVE:  /64 (BP Location: Right arm)   Pulse (!) 125   Temp 98.8  F (37.1  C) (Oral)   Resp 22   Ht 1.727 m (5' 8\")   Wt 87.1 kg (192 lb)   SpO2 96%   BMI 29.19 kg/m                PHYSICAL EXAM:  Alert, awake  Vitals tabulated above, reviewed  Neck supple without lymphadenopathy  Sclera normal color, not injected  CARDIOVASCULAR regular rate and rhythm, no murmur  Lungs some ronchi  Abdomen soft, NT/ND, absent HEPATOSPLENOMEGALY  Skin normal  Joints normal  Neurologic exam non focal    Antibiotics:  V/z/macrolide  Pertinent labs:    Recent Labs   Lab Test 09/22/24  0641 09/21/24  0759 09/20/24  1600   WBC 4.6 6.2 7.8   HGB 10.3* 11.0* 12.7*   HCT 30.2* 32.7* 37.6*   MCV 82 85 82   * 142* 167       Lab Results   Component Value Date    RBC 3.70 09/22/2024    RBC 4.61 04/09/2021     Lab Results   Component Value Date    HGB 10.3 09/22/2024    HGB 11.2 04/09/2021     Lab Results   Component Value Date    HCT 30.2 09/22/2024    HCT 34.6 04/09/2021     No components found for: \"MCT\"  Lab Results   Component Value Date    MCV 82 09/22/2024    MCV 75 04/09/2021     Lab Results   Component Value Date    MCH 27.8 09/22/2024    MCH 24.3 04/09/2021     Lab Results   Component Value Date    MCHC 34.1 09/22/2024    MCHC 32.4 04/09/2021     Lab Results   Component Value Date    RDW 14.0 09/22/2024    RDW 14.9 04/09/2021     Lab Results   Component Value Date     09/22/2024     04/09/2021       Last Comprehensive Metabolic Panel:  Sodium   Date Value Ref Range Status   09/22/2024 132 (L) 135 - 145 mmol/L Final   03/17/2021 137 133 - 144 mmol/L Final     Potassium "   Date Value Ref Range Status   09/22/2024 3.0 (L) 3.4 - 5.3 mmol/L Final   05/28/2022 4.4 3.4 - 5.3 mmol/L Final   03/17/2021 3.9 3.4 - 5.3 mmol/L Final     Chloride   Date Value Ref Range Status   09/22/2024 102 98 - 107 mmol/L Final   05/28/2022 107 94 - 109 mmol/L Final   03/17/2021 106 94 - 109 mmol/L Final     Carbon Dioxide   Date Value Ref Range Status   03/17/2021 24 20 - 32 mmol/L Final     Carbon Dioxide (CO2)   Date Value Ref Range Status   09/22/2024 16 (L) 22 - 29 mmol/L Final   05/28/2022 24 20 - 32 mmol/L Final     Anion Gap   Date Value Ref Range Status   09/22/2024 14 7 - 15 mmol/L Final   05/28/2022 6 3 - 14 mmol/L Final   03/17/2021 8 3 - 14 mmol/L Final     Glucose   Date Value Ref Range Status   09/22/2024 91 70 - 99 mg/dL Final   05/28/2022 97 70 - 99 mg/dL Final   03/17/2021 85 70 - 99 mg/dL Final     Urea Nitrogen   Date Value Ref Range Status   09/22/2024 21.6 8.0 - 23.0 mg/dL Final   05/28/2022 31 (H) 7 - 30 mg/dL Final   03/17/2021 32 (H) 7 - 30 mg/dL Final     Creatinine   Date Value Ref Range Status   09/22/2024 2.22 (H) 0.67 - 1.17 mg/dL Final   03/17/2021 1.80 (H) 0.66 - 1.25 mg/dL Final     GFR Estimate   Date Value Ref Range Status   09/22/2024 33 (L) >60 mL/min/1.73m2 Final     Comment:     eGFR calculated using 2021 CKD-EPI equation.   06/22/2021 34 (L) >60 mL/min/1.73m2 Final   03/17/2021 41 (L) >60 mL/min/[1.73_m2] Final     Comment:     Non  GFR Calc  Starting 12/18/2018, serum creatinine based estimated GFR (eGFR) will be   calculated using the Chronic Kidney Disease Epidemiology Collaboration   (CKD-EPI) equation.       Calcium   Date Value Ref Range Status   09/22/2024 7.2 (L) 8.8 - 10.4 mg/dL Final     Comment:     Reference intervals for this test were updated on 7/16/2024 to reflect our healthy population more accurately. There may be differences in the flagging of prior results with similar values performed with this method. Those prior results can be  interpreted in the context of the updated reference intervals.   03/17/2021 9.0 8.5 - 10.1 mg/dL Final       Liver Function Studies -   Recent Labs   Lab Test 09/22/24  0641   PROTTOTAL 6.5   ALBUMIN 3.0*   BILITOTAL 0.5   ALKPHOS 115   AST 44   ALT 30       Sed Rate   Date Value Ref Range Status   09/23/2019 17 0 - 20 mm/h Final       CRP Cardiac Risk   Date Value Ref Range Status   10/22/2018 12.5 mg/L Final     Comment:     Reference Values:  Low Risk:           <1.0 mg/L  Average Risk:       1.0-3.0 mg/L  High Risk:          >3.0 mg/L  Acute Inflammation: >8.0 mg/L       CRP Inflammation   Date Value Ref Range Status   09/24/2019 <2.9 0.0 - 8.0 mg/L Final               MICROBIOLOGY DATA:        IMAGING/RADIOLOGY:          ASSESSMENT:  Fever  Isolated LLL PNA  Heart TP    RECOMMENDATION:  Legionella urine ag  LDH  follow  SIL Moreno MD  Office [phone] option 2 to desk staff

## 2024-09-22 NOTE — PLAN OF CARE
Problem: Adult Inpatient Plan of Care  Goal: Optimal Comfort and Wellbeing  Outcome: Progressing  Intervention: Monitor Pain and Promote Comfort  Recent Flowsheet Documentation  Taken 9/22/2024 0306 by Dena Foss RN  Pain Management Interventions:   rest   repositioned  Taken 9/22/2024 0017 by Dena Foss RN  Pain Management Interventions:   medication (see MAR)   rest   repositioned     Problem: Diarrhea  Goal: Effective Diarrhea Management  Outcome: Progressing  Intervention: Manage Diarrhea  Recent Flowsheet Documentation  Taken 9/22/2024 0017 by Dnea Foss RN  Isolation Precautions: enteric precautions discontinued  Medication Review/Management: medications reviewed     Problem: Infection  Goal: Absence of Infection Signs and Symptoms  Outcome: Progressing  Intervention: Prevent or Manage Infection  Recent Flowsheet Documentation  Taken 9/22/2024 0017 by Dena Foss RN  Isolation Precautions: enteric precautions discontinued     Problem: Pneumonia  Goal: Fluid Balance  Outcome: Progressing  Goal: Effective Oxygenation and Ventilation  Outcome: Progressing   Goal Outcome Evaluation:         Patient reported some pain to his lower back due to sciatica exacerbated by sitting on the toilet; was given Tylenol for the pain and orthopedic blend essential oils. Patient reported some improvement in pain with sleeping as well. Tele Sinus tach; HR low 100s at rest; 110-130's with activity. Continuous NS at 100ml/hr as well as IV antibiotics. Occult stool was negative; however patient was still cooperative with NPO after midnight for possible EGD. Bowels appear to be slowing down some. Clustered cares and patient was hen able to sleep better tonight.     Patient cranky this morning; stated this is the worst hospital stay he has ever had. He is very frustrated about being NPO and is asking when the doctors are going to be here.       Vitals:    09/21/24 1906 09/21/24 2323 09/22/24 0306 09/22/24  0312   BP: (!) 148/83 137/80 125/81    BP Location: Right arm  Right arm    Pulse: (!) 129 112 105    Resp: 24 24 22    Temp: (!) 103.2  F (39.6  C) 99.7  F (37.6  C) 98.8  F (37.1  C)    TempSrc: Oral Oral Oral    SpO2: 96% 98% 98%    Weight:    87.1 kg (192 lb)   Height:

## 2024-09-22 NOTE — PROGRESS NOTES
[facility]    PROGRESS NOTE - Hospitalist Service    Assessment and Plan  59 year old male with a complex past medical history of atrial fibrillation, ulcerative colitis, hypothyroidism, heart transplant and history of ASD s/p repair as a child who presented to the emergency room with fever and is admitted on 9/20/2024 with pneumonia     Pneumonia with sepsis  -Patient has persistent fever  -Patient is immunocompromised and on immunosuppressant drugs.   - lactate normal at 0.9, procal mildly elevated and CRP very elevated   - UA not concerning for infection; noting ketones, proteinuria and blood  - CXR unrevealing and viral panel negative   - Negative strep and legionella Ag  - CT scan of chest shows left lower lobe pneumonia  - C-diff, enteric panel, cryptosporidium and microsporida are negative  - continue empiric IV antibiotic with vanc and zosyn for now plus azithromycin for atypical coverage  - ID consult, appreciate input  -Unremarkable echo for acute changes.  EF of 60 to 65%,   - if issues noted will need transfer to [place] per Dr. Lemos of transplant team   -Check sputum culture     Melena and hematochezia  - patient states he has noticed dark stool and blood in stool over last few days, no abdominal pain   -GI consulted, question possible scope and bx to assess for ?CMV colitis   -Negative Hemoccult stool  - Continue to monitor hemoglobin  -Unremarkable C. difficile and stool culture     Hematuria  -UA as noted for hematuria.  -urology consult, appreciate input  -non contrast CT of abdomen pelvis as per urology is unremarkable for acute changes.     Anion gap acidosis  Hyponatremia  -likely 2/2 to CKD history and starvation ketosis as pt not eating much.   - Lactate normal. UA with ketones   - D/continue IV fluids and encourage oral intake  -Improving  - recheck with AM labs      A-fib s/p ablation  -mildly elevated BNP with normal troponin, not hypervolemic on exam  -holding  aspirin because of hematuria  -Continue home medications.  -Continue to monitor on telemetry     Acute kidney injury   -Baseline CKD stage III  -Creatinine is above baseline on admission  -Improving with IV fluid, plan to discontinue when diet is advanced  -Continue to hold losartan for now, restart tomorrow if creatinine stable     Chronic anemia  -Of chronic illness  -hgb stable  -Continue to monitor hemoglobin     Hx of heart transplant  Ulcerative colitis  -continue PTA sirolimus, dose reduce azathioprine to 50mg   -TTE as noted above  - GI consulted, appreciate     Acute on chronic hyponatremia  -Because of dehydration  -Improving with IV fluid.  -Continue to monitor sodium level.\    Hypokalemia   -  Replace per protocol   - Unremarkable mag level  -         50 MINUTES SPENT BY ME on the date of service doing chart review, history, exam, documentation & further activities per the note    Active Problems:    Transplanted heart (H)    MATA (dyspnea on exertion)    Tachycardia    History of heart transplant (H)    Fever, unspecified fever cause    Other fatigue      VTE prophylaxis:  Pneumatic Compression Devices  DIET: Orders Placed This Encounter      Low Saturated Fat Na <2400 mg      Disposition/Barriers to discharge: IV antibiotic, still febrile, pending culture.  Anticipate discharge in 2 to 3 days  Code Status: Full Code    Subjective:  [place] is feeling about the same today, still spikes fever overnight.  No nausea or vomiting, tolerating oral diet.    PHYSICAL EXAM  Vitals:    09/20/24 2053 09/21/24 0613 09/22/24 0312   Weight: 86.1 kg (189 lb 14.4 oz) 86.8 kg (191 lb 7 oz) 87.1 kg (192 lb)     B/P:133/64 T:98.8 P:125 R:22     Intake/Output Summary (Last 24 hours) at 9/22/2024 1402  Last data filed at 9/22/2024 1056  Gross per 24 hour   Intake 2641 ml   Output --   Net 2641 ml      Body mass index is 29.19 kg/m .    Constitutional: awake, alert, cooperative, no apparent distress, and appears stated  age  Respiratory: No increased work of breathing, good air exchange, clear to auscultation bilaterally, no crackles or wheezing  Cardiovascular: Normal apical impulse, regular rate and rhythm, normal S1 and S2, no S3 or S4, and no murmur noted  GI: No scars, normal bowel sounds, soft, non-distended, non-tender, no masses palpated, no hepatosplenomegally  Skin: no bruising or bleeding and normal skin color, texture, turgor  Musculoskeletal: There is no redness, warmth, or swelling of the joints.  Full range of motion noted.  no lower extremity pitting edema present  Neurologic: Awake, alert, oriented to name, place and time.  Cranial nerves II-XII are grossly intact.  Motor is 5 out of 5 bilaterally.   Sensory is intact.    Neuropsychiatric: Appropriate with examiner      PERTINENT LABS/IMAGING:    I have personally reviewed the following data over the past 24 hrs:    4.6  \   10.3 (L)   / 131 (L)     132 (L) 102 21.6 /  91   3.0 (L) 16 (L) 2.22 (H) \     ALT: 30 AST: 44 AP: 115 TBILI: 0.5   ALB: 3.0 (L) TOT PROTEIN: 6.5 LIPASE: N/A     Procal: 1.69 (H) CRP: 246.90 (H) Lactic Acid: N/A         Imaging results reviewed over the past 24 hrs:   Recent Results (from the past 24 hour(s))   IR PICC Placement > 5 Yrs of Age    [facility] RADIOLOGY  LOCATION: [facility]  DATE: 9/21/2024    PROCEDURE: PERIPHERALLY INSERTED CENTRAL CATHETER (PICC) PLACEMENT    INTERVENTIONAL RADIOLOGIST: Dewayne Desai MD.    INDICATION: Inadequate IV access. History of heart transplant and left-sided SVC.    Air Kerma:   65 mGy  FLUOROSCOPIC TIME: 1.3 minutes  Contrast: 3 cc Omnipaque 350     COMPLICATIONS: No immediate complications.    STERILE BARRIER TECHNIQUE: Maximum sterile barrier technique was used. Cutaneous antisepsis was performed at the operative site with application of 2% chlorhexidine and large sterile drape. Prior to the procedure, the surgeon and assistant performed hand   hygiene and wore  hat, mask, sterile gown, and sterile gloves during the entire procedure.    PROCEDURE/TECHNIQUE:   Ultrasound revealed a patent and compressible left basilic vein, and an ultrasound image was obtained and placed in the patient's permanent medical record. The left upper arm was prepped and draped in usual sterile fashion followed by local anesthesia   with 1% Xylocaine. Using real-time ultrasound guidance, the left basilic vein was accessed. Left upper extremity venogram was performed through the peel-away sheath. A 5 Greek, 36 cm, double-lumen power PICC Solo peripherally inserted central catheter   was placed under fluoroscopic guidance with its tip near the caval atrial junction.    FINDINGS:   Left upper extremity venogram: Patent left axillary and subclavian veins with outflow via left-sided SVC.    Ultrasound shows an anechoic and compressible left basilic vein.      The completion fluoroscopic images show the catheter tip to lie in the left-sided SVC.      Impression    IMPRESSION:    Successful placement of PICC line.     CT Abdomen Pelvis w/o Contrast    Narrative    EXAM: CT ABDOMEN PELVIS W/O CONTRAST  LOCATION: [facility]  DATE: 9/21/2024    INDICATION: microhematuria, renal failure, cardiac transplant  COMPARISON: None.  TECHNIQUE: CT scan of the abdomen and pelvis was performed without IV contrast. Multiplanar reformats were obtained. Dose reduction techniques were used.  CONTRAST: None.    FINDINGS:   LOWER CHEST: Patchy airspace consolidation noted at the left lung base.    HEPATOBILIARY: Small hepatic cysts. Liver otherwise unremarkable. Gallbladder within normal limits.    PANCREAS: Normal.    SPLEEN: Normal.    ADRENAL GLANDS: Normal.    KIDNEYS/BLADDER: No significant mass, stone, or hydronephrosis.    BOWEL: No obstruction or inflammatory change.    LYMPH NODES: Normal.    VASCULATURE: Moderate atherosclerotic disease of the abdominal aorta and its branches.    PELVIC  ORGANS: Bladder within normal limits.  Tiny fat-containing right inguinal hernia.    MUSCULOSKELETAL: Degenerative changes of the spine.      Impression    IMPRESSION:   1.  No acute findings in the abdomen and pelvis. No stones are identified.         Discussed with patient, family,, nursing staff and discharge planner    Roly Kimble MD  [facility] Medicine Service  [phone]

## 2024-09-23 LAB
ALBUMIN SERPL BCG-MCNC: 3.1 G/DL (ref 3.5–5.2)
ALP SERPL-CCNC: 123 U/L (ref 40–150)
ALT SERPL W P-5'-P-CCNC: 43 U/L (ref 0–70)
ANION GAP SERPL CALCULATED.3IONS-SCNC: 15 MMOL/L (ref 7–15)
AST SERPL W P-5'-P-CCNC: 67 U/L (ref 0–45)
BILIRUB SERPL-MCNC: 0.4 MG/DL
BUN SERPL-MCNC: 19.4 MG/DL (ref 8–23)
C CAYETANENSIS SPEC QL: NORMAL
C PNEUM DNA SPEC QL NAA+PROBE: NOT DETECTED
CALCIUM SERPL-MCNC: 7.3 MG/DL (ref 8.8–10.4)
CHLORIDE SERPL-SCNC: 102 MMOL/L (ref 98–107)
CREAT SERPL-MCNC: 2.16 MG/DL (ref 0.67–1.17)
CRP SERPL-MCNC: 170 MG/L
CRYPTOSP STL QL ACID FAST STN: NORMAL
CYSTOISOSPORA BELLI: NORMAL
EGFRCR SERPLBLD CKD-EPI 2021: 34 ML/MIN/1.73M2
ERYTHROCYTE [DISTWIDTH] IN BLOOD BY AUTOMATED COUNT: 14.2 % (ref 10–15)
FLUAV H1 2009 PAND RNA SPEC QL NAA+PROBE: NOT DETECTED
FLUAV H1 RNA SPEC QL NAA+PROBE: NOT DETECTED
FLUAV H3 RNA SPEC QL NAA+PROBE: NOT DETECTED
FLUAV RNA SPEC QL NAA+PROBE: NOT DETECTED
FLUBV RNA SPEC QL NAA+PROBE: NOT DETECTED
GLUCOSE SERPL-MCNC: 82 MG/DL (ref 70–99)
HADV DNA SPEC QL NAA+PROBE: NOT DETECTED
HAPTOGLOB SERPL-MCNC: 379 MG/DL (ref 30–200)
HCO3 SERPL-SCNC: 15 MMOL/L (ref 22–29)
HCOV PNL SPEC NAA+PROBE: NOT DETECTED
HCT VFR BLD AUTO: 28.8 % (ref 40–53)
HGB BLD-MCNC: 9.9 G/DL (ref 13.3–17.7)
HMPV RNA SPEC QL NAA+PROBE: NOT DETECTED
HOLD SPECIMEN: NORMAL
HPIV1 RNA SPEC QL NAA+PROBE: NOT DETECTED
HPIV2 RNA SPEC QL NAA+PROBE: NOT DETECTED
HPIV3 RNA SPEC QL NAA+PROBE: NOT DETECTED
HPIV4 RNA SPEC QL NAA+PROBE: NOT DETECTED
M PNEUMO DNA SPEC QL NAA+PROBE: NOT DETECTED
MCH RBC QN AUTO: 28.3 PG (ref 26.5–33)
MCHC RBC AUTO-ENTMCNC: 34.4 G/DL (ref 31.5–36.5)
MCV RBC AUTO: 82 FL (ref 78–100)
MICROSPORID STL MOD TRI STAIN: NEGATIVE
PLATELET # BLD AUTO: 132 10E3/UL (ref 150–450)
POTASSIUM SERPL-SCNC: 3.3 MMOL/L (ref 3.4–5.3)
POTASSIUM SERPL-SCNC: 3.3 MMOL/L (ref 3.4–5.3)
POTASSIUM SERPL-SCNC: 3.4 MMOL/L (ref 3.4–5.3)
PROCALCITONIN SERPL IA-MCNC: 1.39 NG/ML
PROT SERPL-MCNC: 6.2 G/DL (ref 6.4–8.3)
RBC # BLD AUTO: 3.5 10E6/UL (ref 4.4–5.9)
RSV RNA SPEC QL NAA+PROBE: NOT DETECTED
RSV RNA SPEC QL NAA+PROBE: NOT DETECTED
RV+EV RNA SPEC QL NAA+PROBE: NOT DETECTED
SODIUM SERPL-SCNC: 132 MMOL/L (ref 135–145)
VANCOMYCIN SERPL-MCNC: 16 UG/ML
WBC # BLD AUTO: 3.8 10E3/UL (ref 4–11)

## 2024-09-23 PROCEDURE — 250N000013 HC RX MED GY IP 250 OP 250 PS 637: Performed by: INTERNAL MEDICINE

## 2024-09-23 PROCEDURE — 84145 PROCALCITONIN (PCT): CPT | Performed by: INTERNAL MEDICINE

## 2024-09-23 PROCEDURE — 250N000011 HC RX IP 250 OP 636: Performed by: INTERNAL MEDICINE

## 2024-09-23 PROCEDURE — 120N000004 HC R&B MS OVERFLOW

## 2024-09-23 PROCEDURE — 84132 ASSAY OF SERUM POTASSIUM: CPT | Performed by: INTERNAL MEDICINE

## 2024-09-23 PROCEDURE — 87486 CHLMYD PNEUM DNA AMP PROBE: CPT | Performed by: INTERNAL MEDICINE

## 2024-09-23 PROCEDURE — 250N000012 HC RX MED GY IP 250 OP 636 PS 637: Performed by: INTERNAL MEDICINE

## 2024-09-23 PROCEDURE — 99233 SBSQ HOSP IP/OBS HIGH 50: CPT | Performed by: INTERNAL MEDICINE

## 2024-09-23 PROCEDURE — 250N000011 HC RX IP 250 OP 636: Performed by: STUDENT IN AN ORGANIZED HEALTH CARE EDUCATION/TRAINING PROGRAM

## 2024-09-23 PROCEDURE — 80202 ASSAY OF VANCOMYCIN: CPT | Performed by: INTERNAL MEDICINE

## 2024-09-23 PROCEDURE — 85014 HEMATOCRIT: CPT | Performed by: INTERNAL MEDICINE

## 2024-09-23 PROCEDURE — 258N000003 HC RX IP 258 OP 636: Performed by: STUDENT IN AN ORGANIZED HEALTH CARE EDUCATION/TRAINING PROGRAM

## 2024-09-23 PROCEDURE — 86140 C-REACTIVE PROTEIN: CPT | Performed by: INTERNAL MEDICINE

## 2024-09-23 PROCEDURE — 250N000012 HC RX MED GY IP 250 OP 636 PS 637: Performed by: STUDENT IN AN ORGANIZED HEALTH CARE EDUCATION/TRAINING PROGRAM

## 2024-09-23 PROCEDURE — 250N000013 HC RX MED GY IP 250 OP 250 PS 637: Performed by: STUDENT IN AN ORGANIZED HEALTH CARE EDUCATION/TRAINING PROGRAM

## 2024-09-23 PROCEDURE — 99232 SBSQ HOSP IP/OBS MODERATE 35: CPT | Performed by: INTERNAL MEDICINE

## 2024-09-23 PROCEDURE — 87798 DETECT AGENT NOS DNA AMP: CPT | Performed by: INTERNAL MEDICINE

## 2024-09-23 PROCEDURE — 82435 ASSAY OF BLOOD CHLORIDE: CPT | Performed by: INTERNAL MEDICINE

## 2024-09-23 RX ORDER — LIDOCAINE 4 G/G
1 PATCH TOPICAL
Status: DISCONTINUED | OUTPATIENT
Start: 2024-09-24 | End: 2024-09-23

## 2024-09-23 RX ORDER — POTASSIUM CHLORIDE 7.45 MG/ML
10 INJECTION INTRAVENOUS
Status: COMPLETED | OUTPATIENT
Start: 2024-09-23 | End: 2024-09-23

## 2024-09-23 RX ORDER — LIDOCAINE 4 G/G
1 PATCH TOPICAL
Status: DISCONTINUED | OUTPATIENT
Start: 2024-09-23 | End: 2024-09-25 | Stop reason: HOSPADM

## 2024-09-23 RX ADMIN — FLUTICASONE FUROATE 1 PUFF: 200 POWDER RESPIRATORY (INHALATION) at 09:28

## 2024-09-23 RX ADMIN — ACETAMINOPHEN 650 MG: 325 TABLET ORAL at 03:37

## 2024-09-23 RX ADMIN — MOMETASONE FUROATE 2 PUFF: 100 AEROSOL RESPIRATORY (INHALATION) at 09:28

## 2024-09-23 RX ADMIN — POTASSIUM CHLORIDE 10 MEQ: 10 INJECTION, SOLUTION INTRAVENOUS at 20:39

## 2024-09-23 RX ADMIN — VANCOMYCIN HYDROCHLORIDE 1000 MG: 1 INJECTION, SOLUTION INTRAVENOUS at 19:48

## 2024-09-23 RX ADMIN — CETIRIZINE HYDROCHLORIDE 10 MG: 10 TABLET, FILM COATED ORAL at 07:52

## 2024-09-23 RX ADMIN — POTASSIUM CHLORIDE 10 MEQ: 10 INJECTION, SOLUTION INTRAVENOUS at 17:23

## 2024-09-23 RX ADMIN — AZITHROMYCIN MONOHYDRATE 500 MG: 500 INJECTION, POWDER, LYOPHILIZED, FOR SOLUTION INTRAVENOUS at 20:38

## 2024-09-23 RX ADMIN — POTASSIUM CHLORIDE 10 MEQ: 7.46 INJECTION, SOLUTION INTRAVENOUS at 07:49

## 2024-09-23 RX ADMIN — POTASSIUM CHLORIDE 10 MEQ: 7.46 INJECTION, SOLUTION INTRAVENOUS at 10:51

## 2024-09-23 RX ADMIN — POTASSIUM CHLORIDE 10 MEQ: 7.46 INJECTION, SOLUTION INTRAVENOUS at 09:29

## 2024-09-23 RX ADMIN — PIPERACILLIN AND TAZOBACTAM 3.38 G: 3; .375 INJECTION, POWDER, FOR SOLUTION INTRAVENOUS at 19:50

## 2024-09-23 RX ADMIN — PANTOPRAZOLE SODIUM 40 MG: 40 TABLET, DELAYED RELEASE ORAL at 16:07

## 2024-09-23 RX ADMIN — LEVOTHYROXINE SODIUM 75 MCG: 0.03 TABLET ORAL at 06:30

## 2024-09-23 RX ADMIN — POTASSIUM CHLORIDE 10 MEQ: 7.46 INJECTION, SOLUTION INTRAVENOUS at 06:30

## 2024-09-23 RX ADMIN — AMLODIPINE BESYLATE 10 MG: 5 TABLET ORAL at 07:52

## 2024-09-23 RX ADMIN — PIPERACILLIN AND TAZOBACTAM 3.38 G: 3; .375 INJECTION, POWDER, FOR SOLUTION INTRAVENOUS at 03:40

## 2024-09-23 RX ADMIN — MOMETASONE FUROATE 2 PUFF: 100 AEROSOL RESPIRATORY (INHALATION) at 20:39

## 2024-09-23 RX ADMIN — Medication 5 MG: at 20:43

## 2024-09-23 RX ADMIN — PANTOPRAZOLE SODIUM 40 MG: 40 TABLET, DELAYED RELEASE ORAL at 06:30

## 2024-09-23 RX ADMIN — DULOXETINE HYDROCHLORIDE 20 MG: 20 CAPSULE, DELAYED RELEASE ORAL at 07:50

## 2024-09-23 RX ADMIN — ROSUVASTATIN CALCIUM 40 MG: 40 TABLET, FILM COATED ORAL at 07:50

## 2024-09-23 RX ADMIN — POTASSIUM CHLORIDE 10 MEQ: 10 INJECTION, SOLUTION INTRAVENOUS at 18:31

## 2024-09-23 RX ADMIN — POTASSIUM CHLORIDE 10 MEQ: 10 INJECTION, SOLUTION INTRAVENOUS at 16:07

## 2024-09-23 RX ADMIN — LIDOCAINE 1 PATCH: 4 PATCH TOPICAL at 20:43

## 2024-09-23 RX ADMIN — PIPERACILLIN AND TAZOBACTAM 3.38 G: 3; .375 INJECTION, POWDER, FOR SOLUTION INTRAVENOUS at 10:51

## 2024-09-23 RX ADMIN — SIROLIMUS 2 MG: 1 TABLET, SUGAR COATED ORAL at 17:24

## 2024-09-23 RX ADMIN — ALTEPLASE 2 MG: 2.2 INJECTION, POWDER, LYOPHILIZED, FOR SOLUTION INTRAVENOUS at 18:45

## 2024-09-23 RX ADMIN — AZATHIOPRINE 50 MG: 50 TABLET ORAL at 09:27

## 2024-09-23 ASSESSMENT — ACTIVITIES OF DAILY LIVING (ADL)
ADLS_ACUITY_SCORE: 22
ADLS_ACUITY_SCORE: 23
ADLS_ACUITY_SCORE: 22
ADLS_ACUITY_SCORE: 23
ADLS_ACUITY_SCORE: 22
ADLS_ACUITY_SCORE: 23
ADLS_ACUITY_SCORE: 22
ADLS_ACUITY_SCORE: 23
ADLS_ACUITY_SCORE: 23
ADLS_ACUITY_SCORE: 22
ADLS_ACUITY_SCORE: 23
ADLS_ACUITY_SCORE: 23
ADLS_ACUITY_SCORE: 22
ADLS_ACUITY_SCORE: 22
ADLS_ACUITY_SCORE: 23
ADLS_ACUITY_SCORE: 22
ADLS_ACUITY_SCORE: 23
ADLS_ACUITY_SCORE: 22

## 2024-09-23 NOTE — PHARMACY-VANCOMYCIN DOSING SERVICE
"Pharmacy Vancomycin Note  Date of Service 2024  Patient's  1965   59 year old, male    Indication: Sepsis  Day of Therapy: 4  Current vancomycin regimen:  1000 mg IV q24h  Current vancomycin monitoring method: AUC  Current vancomycin therapeutic monitoring goal: 400-600 mg*h/L    [employer]ight[employer] Prediction of Current Vancomycin Regimen    Loading dose: N/A  Regimen: 1000 mg IV every 24 hours.  Start time: 19:11 on 2024  Exposure target: AUC24 (range)400-600 mg/L.hr   AUC24,ss: 450 mg/L.hr  Probability of AUC24 > 400: 69 %  Ctrough,ss: 15 mg/L  Probability of Ctrough,ss > 20: 19 %  Probability of nephrotoxicity (Lodise RASHEEDA ): 10 %      Current estimated CrCl = Estimated Creatinine Clearance: 38.4 mL/min (A) (based on SCr of 2.16 mg/dL (H)).    Creatinine for last 3 days  2024:  4:00 PM Creatinine 2.58 mg/dL  2024:  7:59 AM Creatinine 2.40 mg/dL  2024:  6:41 AM Creatinine 2.22 mg/dL  2024:  3:41 AM Creatinine 2.16 mg/dL    Recent Vancomycin Levels (past 3 days)  2024:  3:41 AM Vancomycin 16.0 ug/mL    Vancomycin IV Administrations (past 72 hours)                     vancomycin (VANCOCIN) 1,000 mg in 200 mL dextrose intermittent infusion (mg) 1,000 mg New Bag 24     1,000 mg New Bag 24    vancomycin (VANCOCIN) 1,750 mg in sodium chloride 0.9 % 500 mL intermittent infusion (mg) 1,750 mg Given 24                    Nephrotoxins and other renal medications (From now, onward)      Start     Dose/Rate Route Frequency Ordered Stop    24  vancomycin (VANCOCIN) 1,000 mg in 200 mL dextrose intermittent infusion         1,000 mg  200 mL/hr over 1 Hours Intravenous EVERY 24 HOURS 24 0000  piperacillin-tazobactam (ZOSYN) 3.375 g vial to attach to  mL bag        Note to Pharmacy: For [facility], [facility] and [facility]: For Zosyn-naive patients, use the \"Zosyn initial dose + extended infusion\" order panel.    3.375 g  over 240 " Minutes Intravenous EVERY 8 HOURS 09/20/24 2011                 Contrast Orders - past 72 hours (72h ago, onward)      Start     Dose/Rate Route Frequency Stop    09/21/24 1500  iodixanol (VISIPAQUE 320) injection 50 mL         50 mL Arterial ONCE 09/21/24 1431    09/21/24 1000  perflutren lipid microsphere (DEFINITY) injection SUSP 2 mL         2 mL Intravenous ONCE 09/21/24 0955            Interpretation of levels and current regimen:  Vancomycin level is reflective of -600    Has serum creatinine changed greater than 50% in last 72 hours: No    Urine output:  unable to determine    Renal Function: Stable to improving      Plan:  Continue Current Dose  Vancomycin monitoring method: AUC  Vancomycin therapeutic monitoring goal: 400-600 mg*h/L  Pharmacy will check vancomycin levels as appropriate in 1-3 Days.  Serum creatinine levels will be ordered daily for the first week of therapy and at least twice weekly for subsequent weeks.    Fely Benson, RPH

## 2024-09-23 NOTE — PLAN OF CARE
Problem: Adult Inpatient Plan of Care  Goal: Plan of Care Review  Description: The Plan of Care Review/Shift note should be completed every shift.  The Outcome Evaluation is a brief statement about your assessment that the patient is improving, declining, or no change.  This information will be displayed automatically on your shift  note.  Outcome: Progressing     Problem: Infection  Goal: Absence of Infection Signs and Symptoms  Outcome: Progressing     Problem: Electrolyte Imbalance  Goal: Electrolyte Balance  Outcome: Progressing     Problem: Adult Inpatient Plan of Care  Goal: Absence of Hospital-Acquired Illness or Injury  Intervention: Identify and Manage Fall Risk  Recent Flowsheet Documentation  Taken 9/23/2024 1600 by Natasha Rose RN  Safety Promotion/Fall Prevention:   assistive device/personal items within reach   clutter free environment maintained   nonskid shoes/slippers when out of bed   patient and family education   room organization consistent   safety round/check completed   supervised activity  Taken 9/23/2024 1200 by Natasha Rose RN  Safety Promotion/Fall Prevention:   assistive device/personal items within reach   clutter free environment maintained   nonskid shoes/slippers when out of bed   patient and family education   room organization consistent   safety round/check completed   supervised activity  Taken 9/23/2024 0800 by Natasha Rose RN  Safety Promotion/Fall Prevention:   assistive device/personal items within reach   clutter free environment maintained   nonskid shoes/slippers when out of bed   patient and family education   room organization consistent   safety round/check completed   supervised activity  Intervention: Prevent Skin Injury  Recent Flowsheet Documentation  Taken 9/23/2024 1600 by Natasha Rose RN  Body Position: position changed independently  Taken 9/23/2024 1200 by Natasha Rose RN  Body Position: position changed independently  Taken  9/23/2024 0800 by Natasha Rose RN  Body Position: position changed independently  Intervention: Prevent Infection  Recent Flowsheet Documentation  Taken 9/23/2024 1600 by Natasha Rose RN  Infection Prevention:   rest/sleep promoted   hand hygiene promoted   single patient room provided  Taken 9/23/2024 1200 by Natasha Rose RN  Infection Prevention:   rest/sleep promoted   hand hygiene promoted   single patient room provided  Taken 9/23/2024 0800 by Natasha Rose RN  Infection Prevention:   rest/sleep promoted   hand hygiene promoted   single patient room provided  Goal: Optimal Comfort and Wellbeing  Intervention: Monitor Pain and Promote Comfort  Recent Flowsheet Documentation  Taken 9/23/2024 0753 by Natasha Rose RN  Pain Management Interventions: declines     Problem: Diarrhea  Goal: Effective Diarrhea Management  Intervention: Manage Diarrhea  Recent Flowsheet Documentation  Taken 9/23/2024 1600 by Natasha Rose RN  Medication Review/Management: medications reviewed  Taken 9/23/2024 1200 by Natasha Rose RN  Medication Review/Management: medications reviewed  Taken 9/23/2024 0800 by Natasha Rose RN  Medication Review/Management: medications reviewed     Problem: Pneumonia  Goal: Effective Oxygenation and Ventilation  Intervention: Promote Airway Secretion Clearance  Recent Flowsheet Documentation  Taken 9/23/2024 1600 by Natasha Rose RN  Cough And Deep Breathing: done independently per patient  Taken 9/23/2024 1200 by Natasha Rose RN  Cough And Deep Breathing: done independently per patient  Taken 9/23/2024 0800 by Natasha Rose RN  Cough And Deep Breathing: done independently per patient  Intervention: Optimize Oxygenation and Ventilation  Recent Flowsheet Documentation  Taken 9/23/2024 1600 by Natasha Rose RN  Head of Bed (HOB) Positioning: HOB at 20-30 degrees  Taken 9/23/2024 1200 by Natasha Rose RN  Head of Bed (HOB) Positioning: HOB  at 20-30 degrees  Taken 9/23/2024 0800 by Natasha Rose RN  Head of Bed (HOB) Positioning: HOB at 20-30 degrees     Problem: Hypertension Acute  Goal: Blood Pressure Within Desired Range  Intervention: Normalize Blood Pressure  Recent Flowsheet Documentation  Taken 9/23/2024 1600 by Natasha Rose RN  Medication Review/Management: medications reviewed  Taken 9/23/2024 1200 by Natasha Rose RN  Medication Review/Management: medications reviewed  Taken 9/23/2024 0800 by Natasha Rose RN  Medication Review/Management: medications reviewed     Problem: Cardiac Output Decreased  Goal: Effective Cardiac Output  Intervention: Optimize Cardiac Output  Recent Flowsheet Documentation  Taken 9/23/2024 1600 by Natasha Rose RN  Head of Bed (HOB) Positioning: HOB at 20-30 degrees  Taken 9/23/2024 1200 by Natasha Rose RN  Head of Bed (HOB) Positioning: HOB at 20-30 degrees  Taken 9/23/2024 0800 by Natasha Rose RN  Head of Bed (HOB) Positioning: HOB at 20-30 degrees     Problem: Pain Acute  Goal: Optimal Pain Control and Function  Intervention: Develop Pain Management Plan  Recent Flowsheet Documentation  Taken 9/23/2024 0753 by Natasha Rose RN  Pain Management Interventions: declines  Intervention: Prevent or Manage Pain  Recent Flowsheet Documentation  Taken 9/23/2024 1600 by Natasha Rose RN  Medication Review/Management: medications reviewed  Taken 9/23/2024 1200 by Natasha Rose RN  Medication Review/Management: medications reviewed  Taken 9/23/2024 0800 by Natasha Rose RN  Medication Review/Management: medications reviewed     Problem: Chronic Kidney Disease  Goal: Optimal Functional Ability  Intervention: Optimize Functional Ability  Recent Flowsheet Documentation  Taken 9/23/2024 1600 by Natasha Rose RN  Activity Management:   activity adjusted per tolerance   ambulated in room  Taken 9/23/2024 1200 by Natasha Rose RN  Activity Management:   activity  adjusted per tolerance   ambulated in room  Taken 9/23/2024 0800 by Natasha Rose RN  Activity Management:   activity adjusted per tolerance   ambulated in room  Taken 9/23/2024 0753 by Natasha Rose RN  Activity Management: (transferred from bed to chair this morning.  Steady gait)   ambulated in room   up in chair   other (see comments)  Goal: Acceptable Pain Control  Intervention: Prevent or Manage Pain  Recent Flowsheet Documentation  Taken 9/23/2024 0753 by Natasha Rose RN  Pain Management Interventions: declines  Goal: Minimize Renal Failure Effects  Intervention: Monitor and Support Renal Function  Recent Flowsheet Documentation  Taken 9/23/2024 1600 by Natasha Rose RN  Medication Review/Management: medications reviewed  Taken 9/23/2024 1200 by Natasha Rose RN  Medication Review/Management: medications reviewed  Taken 9/23/2024 0800 by Natasha Rose RN  Medication Review/Management: medications reviewed   Goal Outcome Evaluation:       Replacing potassium with IV dose.  Patient states PO potassium tears up his stomach.  Double lumen PICC line.  Purple port occluded.  Order for TPA received and effective.   Patient on droplet precautions while ruling out Parvovirus.  Collection sent to lab this afternoon.  He continues on IV antibiotic.  Temp 99.6 and 99.4.  Sinus tach low 100s-110s.  Loose productive cough.  Encouraged patient to spit sputum out.  He states he usually swallows.  Up in room independent.

## 2024-09-23 NOTE — PLAN OF CARE
Problem: Adult Inpatient Plan of Care  Goal: Optimal Comfort and Wellbeing  Intervention: Monitor Pain and Promote Comfort  Recent Flowsheet Documentation  Taken 9/23/2024 0315 by Louann Gamble RN  Pain Management Interventions: medication (see MAR)     Problem: Diarrhea  Goal: Effective Diarrhea Management  Outcome: Progressing  Intervention: Manage Diarrhea  Recent Flowsheet Documentation  Taken 9/23/2024 0000 by Louann Gamble RN  Medication Review/Management: medications reviewed     Problem: Pain Acute  Goal: Optimal Pain Control and Function  Intervention: Prevent or Manage Pain  Recent Flowsheet Documentation  Taken 9/23/2024 0000 by Louann Gamble RN  Medication Review/Management: medications reviewed      Problem: Electrolyte Imbalance  Goal: Electrolyte Balance  Outcome: Progressing   Goal Outcome Evaluation:      Plan of Care Reviewed With: patient    Overall Patient Progress: improvingOverall Patient Progress: improving       A & O, VSS on RA. Sinus rhythm-tachy. Tylenol given for lower back pain. K+ being replaced per protocol, pt prefers IV route. K+ recheck @ 1300.

## 2024-09-23 NOTE — PROGRESS NOTES
[facility]    PROGRESS NOTE - Hospitalist Service    Assessment and Plan  59 year old male with a complex past medical history of atrial fibrillation, ulcerative colitis, hypothyroidism, heart transplant and history of ASD s/p repair as a child who presented to the emergency room with fever and is admitted on 9/20/2024 with pneumonia     Pneumonia with sepsis  -Patient has persistent fever  -Patient is immunocompromised and on immunosuppressant drugs.   - lactate normal at 0.9, procal mildly elevated and CRP very elevated   - UA not concerning for infection; noting ketones, proteinuria and blood  - CXR unrevealing and viral panel negative   - Negative strep and legionella Ag  - CT scan of chest shows left lower lobe pneumonia  - C-diff, enteric panel, cryptosporidium and microsporida are negative  - continue empiric IV antibiotic with vanc and zosyn for now plus azithromycin for atypical coverage  - ID consult, appreciate input  - Unremarkable echo for acute changes.  EF of 60 to 65%,   - if issues noted will need transfer to [place] per Dr. Lemos of transplant team   -Pending k sputum culture  -Negative Legionella      Melena and hematochezia  - patient states he has noticed dark stool and blood in stool over last few days, no abdominal pain   -GI consulted, question possible scope and bx to assess for ?CMV colitis   - Negative Hemoccult stool  - Continue to monitor hemoglobin  -Unremarkable C. difficile and stool culture     Hematuria  - UA as noted for hematuria.  - urology consult, appreciate input  - non contrast CT of abdomen pelvis as per urology is unremarkable for acute changes.     Anion gap acidosis  Hyponatremia  -likely 2/2 to CKD history and starvation ketosis as pt not eating much.   - Lactate normal. UA with ketones   - D/continue IV fluids and encourage oral intake  -Improving  -Continue to monitor BMP     A-fib s/p ablation  -mildly elevated BNP with normal troponin, not  hypervolemic on exam  -holding aspirin because of hematuria  -Continue home medications.  -Continue to monitor on telemetry     Acute kidney injury   -Baseline CKD stage III  -Creatinine is above baseline on admission  -Improving with IV fluid, plan to discontinue when diet is advanced  -Continue to hold losartan for now, restart tomorrow if creatinine stable     Chronic anemia  -Of chronic illness  -hgb stable  -Continue to monitor hemoglobin     Hx of heart transplant  Ulcerative colitis  -continue PTA sirolimus, dose reduce azathioprine to 50mg   -TTE as noted above  - GI consulted, appreciate     Acute on chronic hyponatremia  -Because of dehydration  -Improving with IV fluid.  -Continue to monitor sodium level.\     Hypokalemia   -  Replace per protocol   - Unremarkable mag level  -   40 MINUTES SPENT BY ME on the date of service doing chart review, history, exam, documentation & further activities per the note    Active Problems:    Transplanted heart (H)    MATA (dyspnea on exertion)    Tachycardia    History of heart transplant (H)    Fever, unspecified fever cause    Other fatigue      VTE prophylaxis:  Pneumatic Compression Devices  DIET: Orders Placed This Encounter      Low Saturated Fat Na <2400 mg      Disposition/Barriers to discharge: IV antibiotic, pending culture, ID clearance, dispo discharge in 1 to 2 days  Code Status: Full Code    Subjective:  [place] is feeling much better today, no fever overnight.  Denies any chest pain shortness of breath.    PHYSICAL EXAM  Vitals:    09/21/24 0613 09/22/24 0312 09/23/24 0630   Weight: 86.8 kg (191 lb 7 oz) 87.1 kg (192 lb) 86.3 kg (190 lb 3.2 oz)     B/P:149/78 T:99.6 P:124 R:26     Intake/Output Summary (Last 24 hours) at 9/23/2024 1526  Last data filed at 9/23/2024 0630  Gross per 24 hour   Intake 240 ml   Output --   Net 240 ml      Body mass index is 28.92 kg/m .    Constitutional: awake, alert, cooperative, no apparent distress, and appears stated  age  Respiratory: No increased work of breathing, good air exchange, clear to auscultation bilaterally, no crackles or wheezing  Cardiovascular: Normal apical impulse, regular rate and rhythm, normal S1 and S2, no S3 or S4, and no murmur noted  GI: No scars, normal bowel sounds, soft, non-distended, non-tender, no masses palpated, no hepatosplenomegally  Skin: no bruising or bleeding and normal skin color, texture, turgor  Musculoskeletal: There is no redness, warmth, or swelling of the joints.  Full range of motion noted.  no lower extremity pitting edema present  Neurologic: Awake, alert, oriented to name, place and time.  Cranial nerves II-XII are grossly intact.  Motor is 5 out of 5 bilaterally.   Sensory is intact.    Neuropsychiatric: Appropriate with examiner      PERTINENT LABS/IMAGING:    I have personally reviewed the following data over the past 24 hrs:    3.8 (L)  \   9.9 (L)   / 132 (L)     132 (L) 102 19.4 /  82   3.4 15 (L) 2.16 (H) \     ALT: 43 AST: 67 (H) AP: 123 TBILI: 0.4   ALB: 3.1 (L) TOT PROTEIN: 6.2 (L) LIPASE: N/A     Procal: 1.39 (H) CRP: 170.00 (H) Lactic Acid: N/A         Imaging results reviewed over the past 24 hrs:   No results found for this or any previous visit (from the past 24 hour(s)).    Discussed with patient, family,ID, nursing staff and discharge planner    Roly Kimble MD  [facility] Medicine Service  [phone]

## 2024-09-23 NOTE — PROGRESS NOTES
"[facility] Progress Note    09/23/2024     Chart reviewed  Patient seen and updated    Previous note    ASSESSMENT:  Fever  Isolated LLL PNA  S/ P Heart Transplant  Hx of Ulcerative Colitis    RECOMMENDATION:  Legionella urine ag- negative  LDH  Parvo, Resp PCR panel  Follow- patient hoping to discharge on 9/24/24  Needs Heart Transplant Clinic follow up at [facility] for management of immunosuppression    Please see previous notes by SIL Jauregui MD  [facility]  Answering Service: [phone]  On-Call ID provider: [phone], option: 9      SUBJECTIVE: chart reviewed  Overall better      Previous note   cough sounds wet, but a sample set did not satisfy dx criteria.  I am going to check legionella given the low NA.  Fevers better but not near normal yet.        REVIEW OF SYSTEMS:  Negative unless as listed above.  Social history, Family history, Medications: reviewed.    OBJECTIVE:  /67   Pulse 96   Temp 98.4  F (36.9  C) (Oral)   Resp 18   Ht 1.727 m (5' 8\")   Wt 86.3 kg (190 lb 3.2 oz)   SpO2 97%   BMI 28.92 kg/m                PHYSICAL EXAM:  Alert, awake  Vitals tabulated above, reviewed  Neck supple without lymphadenopathy  Sclera normal color, not injected  Previously   CARDIOVASCULAR regular rate and rhythm, no murmur  Lungs some ronchi  Abdomen soft, NT/ND, absent HEPATOSPLENOMEGALY  Skin normal  Joints normal  Neurologic exam non focal    Antibiotics:  V/z/macrolide  Pertinent labs:    Recent Labs   Lab Test 09/23/24  0627 09/22/24  0641 09/21/24  0759   WBC 3.8* 4.6 6.2   HGB 9.9* 10.3* 11.0*   HCT 28.8* 30.2* 32.7*   MCV 82 82 85   * 131* 142*       Lab Results   Component Value Date    RBC 3.70 09/22/2024    RBC 4.61 04/09/2021     Lab Results   Component Value Date    HGB 10.3 09/22/2024    HGB 11.2 04/09/2021     Lab Results   Component Value Date    HCT 30.2 09/22/2024    HCT 34.6 04/09/2021     No " "components found for: \"MCT\"  Lab Results   Component Value Date    MCV 82 09/22/2024    MCV 75 04/09/2021     Lab Results   Component Value Date    MCH 27.8 09/22/2024    MCH 24.3 04/09/2021     Lab Results   Component Value Date    MCHC 34.1 09/22/2024    MCHC 32.4 04/09/2021     Lab Results   Component Value Date    RDW 14.0 09/22/2024    RDW 14.9 04/09/2021     Lab Results   Component Value Date     09/22/2024     04/09/2021       Last Comprehensive Metabolic Panel:  Sodium   Date Value Ref Range Status   09/23/2024 132 (L) 135 - 145 mmol/L Final   03/17/2021 137 133 - 144 mmol/L Final     Potassium   Date Value Ref Range Status   09/23/2024 3.3 (L) 3.4 - 5.3 mmol/L Final   09/23/2024 3.3 (L) 3.4 - 5.3 mmol/L Final   05/28/2022 4.4 3.4 - 5.3 mmol/L Final   03/17/2021 3.9 3.4 - 5.3 mmol/L Final     Chloride   Date Value Ref Range Status   09/23/2024 102 98 - 107 mmol/L Final   05/28/2022 107 94 - 109 mmol/L Final   03/17/2021 106 94 - 109 mmol/L Final     Carbon Dioxide   Date Value Ref Range Status   03/17/2021 24 20 - 32 mmol/L Final     Carbon Dioxide (CO2)   Date Value Ref Range Status   09/23/2024 15 (L) 22 - 29 mmol/L Final   05/28/2022 24 20 - 32 mmol/L Final     Anion Gap   Date Value Ref Range Status   09/23/2024 15 7 - 15 mmol/L Final   05/28/2022 6 3 - 14 mmol/L Final   03/17/2021 8 3 - 14 mmol/L Final     Glucose   Date Value Ref Range Status   09/23/2024 82 70 - 99 mg/dL Final   05/28/2022 97 70 - 99 mg/dL Final   03/17/2021 85 70 - 99 mg/dL Final     Urea Nitrogen   Date Value Ref Range Status   09/23/2024 19.4 8.0 - 23.0 mg/dL Final   05/28/2022 31 (H) 7 - 30 mg/dL Final   03/17/2021 32 (H) 7 - 30 mg/dL Final     Creatinine   Date Value Ref Range Status   09/23/2024 2.16 (H) 0.67 - 1.17 mg/dL Final   03/17/2021 1.80 (H) 0.66 - 1.25 mg/dL Final     GFR Estimate   Date Value Ref Range Status   09/23/2024 34 (L) >60 mL/min/1.73m2 Final     Comment:     eGFR calculated using 2021 CKD-EPI " equation.   06/22/2021 34 (L) >60 mL/min/1.73m2 Final   03/17/2021 41 (L) >60 mL/min/[1.73_m2] Final     Comment:     Non  GFR Calc  Starting 12/18/2018, serum creatinine based estimated GFR (eGFR) will be   calculated using the Chronic Kidney Disease Epidemiology Collaboration   (CKD-EPI) equation.       Calcium   Date Value Ref Range Status   09/23/2024 7.3 (L) 8.8 - 10.4 mg/dL Final     Comment:     Reference intervals for this test were updated on 7/16/2024 to reflect our healthy population more accurately. There may be differences in the flagging of prior results with similar values performed with this method. Those prior results can be interpreted in the context of the updated reference intervals.   03/17/2021 9.0 8.5 - 10.1 mg/dL Final       Liver Function Studies -   Recent Labs   Lab Test 09/22/24  0641   PROTTOTAL 6.5   ALBUMIN 3.0*   BILITOTAL 0.5   ALKPHOS 115   AST 44   ALT 30       Sed Rate   Date Value Ref Range Status   09/23/2019 17 0 - 20 mm/h Final       CRP Cardiac Risk   Date Value Ref Range Status   10/22/2018 12.5 mg/L Final     Comment:     Reference Values:  Low Risk:           <1.0 mg/L  Average Risk:       1.0-3.0 mg/L  High Risk:          >3.0 mg/L  Acute Inflammation: >8.0 mg/L       CRP Inflammation   Date Value Ref Range Status   09/24/2019 <2.9 0.0 - 8.0 mg/L Final               MICROBIOLOGY DATA:    7-Day Micro Results       Collected Updated Procedure Result Status      09/22/2024 1735 09/22/2024 2140 Legionella Urinary Antigen and Streptococcus pneumoniae antigen [[id number]]    Urine, Voided    Final result Component Value   Legionella pneumophila serogroup 1 urinary antigen Negative   A negative result does not exclude the possibility of a Legionella infection, as it can be caused by other serogroups and species of Legionella.   Streptococcus pneumoniae antigen Negative   A negative result does not exclude a Streptococcus pneumoniae infection.   Legionella  pneumophila Urinary/Strep pneumoniae Antigen Specimen Type Urine            09/22/2024 0836 09/22/2024 1352 Respiratory Aerobic Bacterial Culture with Gram Stain [[id number]]   Sputum from Expectorate    Final result Component Value   Culture >10 Squamous epithelial cells/low power field indicates oral contamination. Please recollect.   Gram Stain Result >10 Squamous epithelial cells/low power field    <25 PMNs/low power field    4+ Mixed nima               09/21/2024 0059 09/21/2024 0436 Legionella Urinary Antigen and Streptococcus pneumoniae antigen [[id number]]    Urine, Midstream    Final result Component Value   Legionella pneumophila serogroup 1 urinary antigen Negative   A negative result does not exclude the possibility of a Legionella infection, as it can be caused by other serogroups and species of Legionella.   Streptococcus pneumoniae antigen Negative   A negative result does not exclude a Streptococcus pneumoniae infection.   Legionella pneumophila Urinary/Strep pneumoniae Antigen Specimen Type Urine            09/20/2024 2139 09/22/2024 2135 1,3 Beta D glucan fungitell [[id number]]   Peripheral Blood    Final result Component Value Units   (1,3)-Beta-D-Glucan <31 pg/mL   B-D GLUCAN INTERPRETATION (1,3) Negative    INTERPRETIVE INFORMATION: (1,3)-beta-D-glucan (Fungitell)      Less than 31 pg/mL ................... Negative    31-59 pg/mL .......................... Negative    60-79 pg/mL .......................... Indeterminate    Greater than or equal to 80 pg/mL .... Positive    The Fungitell test is indicated for presumptive diagnosis   of fungal infection and should be used in conjunction with   other diagnostic procedures. This test does not detect   certain fungal species such as Cryptococcus, which produce   very low levels of (1,3)-beta-D-glucan. This test will not   detect the zygomycetes, such as Absidia, Mucor, and   Rhizopus, which are not known to produce   (1,3)-beta-D-glucan. In  addition, the yeast phase of   Blastomyces dermatitidis produces little   (1,3)-beta-D-glucan and may not be detected by the assay.  Performed By: [facility]  [address] [place] [id number]  : Myles Soliz MD, PhD  CLIA Number: [id number]            09/20/2024 1751 09/20/2024 1754 Microsporidia stool [[id number]]   Stool from Per Rectum    In process Component Value   No component results            09/20/2024 1751 09/20/2024 2216 C. difficile Toxin B PCR with reflex to C. difficile Antigen and Toxins A/B EIA [[id number]]    Stool from Per Rectum    Final result Component Value   C Difficile Toxin B by PCR Negative   A negative result does not exclude actual disease due to C. difficile and may be due to improper collection, handling and storage of the specimen or the number of organisms in the specimen is below the detection limit of the assay.            09/20/2024 1751 09/20/2024 2300 Enteric Bacteria and Virus Panel by STEFANY Stool (Salmonella, Shigella and Campylobacter, Shiga Toxin 1&2, Yersinia enterocolitica, Vibrio, Norovirus and Rotavirus) [[id number]]    Stool from Per Rectum    Final result Component Value   Campylobacter species Negative   Salmonella species Negative   Vibrio species Negative   Vibrio cholerae Negative   Yersinia enterocolitica Negative   Enteropathogenic E. coli (EPEC) Negative   Shiga-like toxin-producing E. coli (STEC) Negative   Shigella/Enteroinvasive E. coli (EIEC) Negative   Cryptosporidium species Negative   Giardia lamblia Negative   Norovirus Gl/Gll Negative   Rotavirus A Negative   Plesiomonas shigelloides Negative   Enteroaggregative E. coli (EAEC) Negative   Enterotoxigenic E. coli (ETEC) Negative   E. coli O157 NA   Cyclospora cayetanensis Negative   Entamoeba histolytica Negative   Adenovirus F40/41 Negative   Astrovirus Negative   Sapovirus Negative            09/20/2024 1751 09/20/2024 1754 Cryptosporidium in stool stain  [[id number]]   Stool from Per Rectum    In process Component Value   No component results            09/20/2024 1633 09/22/2024 1946 Blood Culture Peripheral Blood [[id number]]   Peripheral Blood    Preliminary result Component Value   Culture No growth after 2 days  [P]                09/20/2024 1601 09/20/2024 1644 Symptomatic Influenza A/B, RSV, & SARS-CoV2 PCR (COVID-19) Nasopharyngeal [[id number]]    Swab from Nasopharyngeal    Final result Component Value   Influenza A PCR Negative   Influenza B PCR Negative   RSV PCR Negative   SARS CoV2 PCR Negative   NEGATIVE: SARS-CoV-2 (COVID-19) RNA not detected, presumed negative.            09/19/2024 1600 09/22/2024 1931 Blood Culture Peripheral Blood [[id number]]   Peripheral Blood    Preliminary result Component Value   Culture No growth after 3 days  [P]                09/19/2024 1600 09/20/2024 1237 Cytomegalovirus DNA by PCR, Quantitative [[id number]]    Blood from Arm, Left    Final result Component Value Units   CMV DNA IU/mL Not Detected IU/mL            09/19/2024 1600 09/20/2024 1712 Sonya Barr Virus Quantitative PCR, Plasma [[id number]]    Blood from Arm, Left    Final result Component Value Units   EBV DNA IU/mL Not Detected IU/mL                          IMAGING/RADIOLOGY:    CT Abdomen Pelvis w/o Contrast    Result Date: 9/21/2024  EXAM: CT ABDOMEN PELVIS W/O CONTRAST LOCATION: [facility] DATE: 9/21/2024 INDICATION: microhematuria, renal failure, cardiac transplant COMPARISON: None. TECHNIQUE: CT scan of the abdomen and pelvis was performed without IV contrast. Multiplanar reformats were obtained. Dose reduction techniques were used. CONTRAST: None. FINDINGS: LOWER CHEST: Patchy airspace consolidation noted at the left lung base. HEPATOBILIARY: Small hepatic cysts. Liver otherwise unremarkable. Gallbladder within normal limits. PANCREAS: Normal. SPLEEN: Normal. ADRENAL GLANDS: Normal. KIDNEYS/BLADDER: No significant  mass, stone, or hydronephrosis. BOWEL: No obstruction or inflammatory change. LYMPH NODES: Normal. VASCULATURE: Moderate atherosclerotic disease of the abdominal aorta and its branches. PELVIC ORGANS: Bladder within normal limits.  Tiny fat-containing right inguinal hernia. MUSCULOSKELETAL: Degenerative changes of the spine.     IMPRESSION: 1.  No acute findings in the abdomen and pelvis. No stones are identified.     IR PICC Placement > 5 Yrs of Age    Result Date: 9/21/2024  [facility] LOCATION: [facility] DATE: 9/21/2024 PROCEDURE: PERIPHERALLY INSERTED CENTRAL CATHETER (PICC) PLACEMENT INTERVENTIONAL RADIOLOGIST: Dewayne Desai MD. INDICATION: Inadequate IV access. History of heart transplant and left-sided SVC. Air Kerma:   65 mGy FLUOROSCOPIC TIME: 1.3 minutes Contrast: 3 cc Omnipaque 350 COMPLICATIONS: No immediate complications. STERILE BARRIER TECHNIQUE: Maximum sterile barrier technique was used. Cutaneous antisepsis was performed at the operative site with application of 2% chlorhexidine and large sterile drape. Prior to the procedure, the surgeon and assistant performed hand  hygiene and wore hat, mask, sterile gown, and sterile gloves during the entire procedure. PROCEDURE/TECHNIQUE: Ultrasound revealed a patent and compressible left basilic vein, and an ultrasound image was obtained and placed in the patient's permanent medical record. The left upper arm was prepped and draped in usual sterile fashion followed by local anesthesia with 1% Xylocaine. Using real-time ultrasound guidance, the left basilic vein was accessed. Left upper extremity venogram was performed through the peel-away sheath. A 5 Singaporean, 36 cm, double-lumen power PICC Solo peripherally inserted central catheter was placed under fluoroscopic guidance with its tip near the caval atrial junction. FINDINGS: Left upper extremity venogram: Patent left axillary and subclavian veins with outflow via left-sided  SVC. Ultrasound shows an anechoic and compressible left basilic vein.  The completion fluoroscopic images show the catheter tip to lie in the left-sided SVC.     IMPRESSION:  Successful placement of PICC line.     Echocardiogram Complete    Result Date: 2024  [id number]^MARIANGEL^[place] [address]  Name: ANA ROSA BALDWIN MRN: [id number] : 1965 Study Date: 2024 09:38 AM Age: 59 yrs Gender: Male Patient Location: [facility] Reason For Study: Tachycardia Ordering Physician: JAMES AUGUST Referring Physician: JAMES AUGUST Performed By: TE  BSA: 2.0 m2 Height: 68 in Weight: 191 lb HR: 117 ______________________________________________________________________________ Procedure Complete Echo Adult. Definity (NDC #15909-091) given intravenously. ______________________________________________________________________________ Interpretation Summary  Left ventricular size, wall motion and function are normal. The ejection fraction is 60-65%. Mildly decreased right ventricular systolic function The rhythm was sinus tachycardia. Right ventricular systolic pressure could not be approximated due to inadequate tricuspid regurgitation. No hemodynamically significant valvular abnormalities on 2D or color flow imaging. ______________________________________________________________________________ Left Ventricle Left ventricular size, wall motion and function are normal. The ejection fraction is 60-65%. There is normal left ventricular wall thickness. Left ventricular diastolic function is normal. No regional wall motion abnormalities noted.  Right Ventricle The right ventricle is normal size. Mildly decreased right ventricular systolic function. TAPSE is abnormal, which is consistent with abnormal right ventricular systolic function.  Atria The left atrium is moderate to severely dilated. Right atrial size is normal. There is no color Doppler  evidence of an atrial shunt.  Mitral Valve Mitral valve leaflets appear normal. There is no evidence of mitral stenosis or clinically significant mitral regurgitation.  Tricuspid Valve The tricuspid valve is not well visualized, but is grossly normal. Right ventricular systolic pressure could not be approximated due to inadequate tricuspid regurgitation. There is trace tricuspid regurgitation.  Aortic Valve Aortic valve leaflets appear normal. There is no evidence of aortic stenosis or clinically significant aortic regurgitation.  Pulmonic Valve The pulmonic valve is not well seen, but is grossly normal. There is trace pulmonic valvular regurgitation.  Vessels The aorta root is normal. Normal size ascending aorta. IVC diameter <2.1 cm collapsing >50% with sniff suggests a normal RA pressure of 3 mmHg.  Pericardium There is no pericardial effusion.  Rhythm The rhythm was sinus tachycardia. ______________________________________________________________________________ MMode/2D Measurements & Calculations  IVSd: 0.89 cm LVIDd: 4.3 cm LVIDs: 3.0 cm LVPWd: 0.94 cm FS: 30.9 % LV mass(C)d: 127.7 grams LV mass(C)dI: 63.7 grams/m2 Ao root diam: 3.0 cm LA dimension: 4.1 cm asc Aorta Diam: 3.1 cm LA/Ao: 1.4 LVOT diam: 2.1 cm LVOT area: 3.5 cm2 Ao root diam index Ht(cm/m): 1.7 Ao root diam index BSA (cm/m2): 1.5 Asc Ao diam index BSA (cm/m2): 1.5 Asc Ao diam index Ht(cm/m): 1.8 LA Volume (BP): 52.5 ml  LA Volume Indexed (AL/bp): 27.2 ml/m2 RV Base: 3.6 cm RWT: 0.43 TAPSE: 1.2 cm  Doppler Measurements & Calculations MV E max sandro: 89.6 cm/sec MV max PG: 3.9 mmHg MV mean P.0 mmHg MV V2 VTI: 13.1 cm MVA(VTI): 2.8 cm2 MV dec slope: 545.0 cm/sec2 MV dec time: 0.16 sec Ao V2 max: 110.0 cm/sec Ao max P.0 mmHg Ao V2 mean: 71.8 cm/sec Ao mean P.0 mmHg Ao V2 VTI: 16.9 cm ALONZO(I,D): 2.2 cm2 ALONZO(V,D): 2.3 cm2 LV V1 max P.2 mmHg LV V1 max: 73.4 cm/sec LV V1 VTI: 10.5 cm SV(LVOT): 36.4 ml SI(LVOT): 18.1 ml/m2 PA acc time: 0.11  sec AV Ata Ratio (DI): 0.67  ALONZO Index (cm2/m2): 1.1 E/E' av.1 Lateral E/e': 6.9 Medial E/e': 7.2 RV S Ata: 9.3 cm/sec  ______________________________________________________________________________ Report approved by: Marva Oakes 2024 02:46 PM       Chest CT w/o contrast    Result Date: 2024  EXAM: CT CHEST W/O CONTRAST LOCATION: [facility] DATE: 2024 INDICATION: Fever, shortness of breath. COMPARISON: Chest 10/20/2022. TECHNIQUE: CT chest without IV contrast. Multiplanar reformats were obtained. Dose reduction techniques were used. CONTRAST: None. FINDINGS: LUNGS AND PLEURA: Consolidative opacities with air bronchograms in the left lower lobe. Scattered punctate calcified granulomas in the right lower lobe. A 3 mm nodule in the right upper lobe (4/#136) is new since the prior exam. A 2 mm left upper lobe nodule at the apex (4/#45) is unchanged. MEDIASTINUM/AXILLAE: No enlarged thoracic lymph node. Postoperative changes from heart transplantation with a left brachiocephalic vein stent. Patency of the stent is not assessed without contrast material. No pericardial effusion. Surgical clips within the left axilla and mediastinum. CORONARY ARTERY CALCIFICATION: None. UPPER ABDOMEN: Small left hepatic cyst. Unchanged small benign right adrenal myelolipoma. MUSCULOSKELETAL: Median sternotomy wires. Unchanged L1 compression deformity. Multilevel degenerative changes of the spine. No acute bony abnormalities.     IMPRESSION: 1.  Left lower lobe pneumonia. 2.  A 3 mm right upper lobe nodule is new since 10/28/2022. See follow-up guidelines below. REFERENCE: Guidelines for Management of Incidental Pulmonary Nodules Detected on CT Images: From the Fleischner Society 2017. Guidelines apply to incidental nodules in patients who are 35 years or older. Guidelines do not apply to lung cancer screening, patients with immunosuppression, or patients with known primary cancer.  SINGLE NODULE Nodule size <6 mm Low-risk patients: No follow-up needed. High-risk patients: Optional follow-up at 12 months.    XR Chest Port 1 View    Result Date: 9/20/2024  EXAM: XR CHEST PORT 1 VIEW LOCATION: [facility] DATE: 09/20/2024 INDICATION: Short of breath. COMPARISON: None.     IMPRESSION: Sternotomy. Heart size within normal limits. Stable soft tissue prominence in the upper mediastinum. Old right rib fracture. Clips in the left axilla. No pulmonary infiltrates.      Medical Decision Making       MANAGEMENT DISCUSSED with the following over the past 24 hours: patient   NOTE(S)/MEDICAL RECORDS REVIEWED over the past 24 hours: reviewed  Tests ORDERED & REVIEWED in the past 24 hours:  - See lab/imaging results included in the data section of the note  Medical complexity over the past 24 hours:  - Intensive monitoring for MEDICATION TOXICITY  - IV antibiotics, coordination of care

## 2024-09-23 NOTE — PROGRESS NOTES
"    GASTROENTEROLOGY PROGRESS NOTE       SUBJECTIVE   Patient was seen in his room.  His Hemoccult stool came back negative.  No more black-colored stools noticed.  Able to tolerate food without any difficulty.  No abdominal pain.     OBJECTIVE     Vitals /82 (BP Location: Left arm)   Pulse 111   Temp 99.1  F (37.3  C) (Oral)   Resp 22   Ht 1.727 m (5' 8\")   Wt 87.1 kg (192 lb)   SpO2 98%   BMI 29.19 kg/m          PHYSICAL EXAM:  General:   Patient is lying in bed in no acute distress.   Head:  Atraumatic, normocephalic   Eyes:   Conjunctivae normal, no scleral icterus. Extraocular movements intact. Pupils equal, round, and reactive to light.   Ears:  Hearing grossly intact   Mouth:  Oral mucosa moist, normal. No ulcers.   Neck:   Supple, no carotid bruits or elevated JVP. Thyroid not palpable. No cervical lymphadenopathy. Trachea midline.   Chest:  Clear to auscultation bilaterally. No wheezes, rales or rhonchi. Bilateral equal air entry. Normal respiratory effort. No cyanosis.   Heart/vasc:  S1, S2 heard normal. No murmurs, gallops or rubs. All peripheral pulses palpable and symmetric.   Abdomen:   Soft, non-distended, non-tender. No peritoneal signs. No organomegaly. No shifting dullness or ascites. Bowel sounds are normal.          LABORATORY AND IMAGING   ELECTROLYTE PANEL   Recent Labs   Lab Test 09/22/24  1749 09/22/24  0641 09/21/24  0759 09/20/24  1600   POTASSIUM 3.1* 3.0* 3.8 3.8   CHLORIDE  --  102 98 96*   CO2  --  16* 18* 15*   BUN  --  21.6 24.4* 27.2*   ANIONGAP  --  14 14 18*        HEMATOLOGY PANEL   Recent Labs   Lab Test 09/22/24  0641 09/21/24  0759 09/20/24  1600 09/23/19  2245 09/23/19  1534 05/15/19  0940 05/08/19  0504 05/07/19  0451   WBC 4.6 6.2 7.8   < > 4.6   < > 6.3 6.6   HGB 10.3* 11.0* 12.7*   < > 10.2*   < > 9.0* 8.4*   MCV 82 85 82   < > 82   < > 94 97   * 142* 167   < > 290   < > 372 336   INR  --   --   --   --  1.17*  --  1.22* 1.33*    < > = values in this " interval not displayed.        LIVER AND PANCREAS PANEL   Recent Labs   Lab Test 09/22/24  0641 09/21/24  0759 09/20/24  1751 09/20/24  1735 06/18/24  1420 03/04/24  1519 09/29/23  1546 02/23/23  0920 09/24/19  0435 09/23/19  1534 04/29/19  1031 04/29/19  0911 03/26/19  0531 03/18/19  0505 02/23/19  1921 02/23/19  1835   ALBUMIN 3.0* 3.5 3.7  --    < >  --    < >  --    < > 4.0   < > 3.5   < > 2.6*   < > 3.4   BILITOTAL 0.5 0.7 0.5  --   --   --    < >  --    < > 0.4   < > 1.0   < > 0.8   < > 1.6*   ALT 30 25 25  --   --   --    < >  --    < > 17   < > 17   < > 57   < > 15   AST 44 30 24  --   --   --    < >  --    < > 16   < > 10   < > 36   < > 21   PROTEIN  --   --   --  600*  --  >=300*  --  300*   < >  --    < >  --    < >  --    < >  --    LIPASE  --   --   --   --   --   --   --   --   --  151  --  42*  --  237  --   --    AMYLASE  --   --   --   --   --   --   --   --   --   --   --   --   --  51  --  22*    < > = values in this interval not displayed.        I have reviewed the current diagnostic and laboratory tests.     Current medications:    Current Facility-Administered Medications:      acetaminophen (TYLENOL) tablet 650 mg, 650 mg, Oral, Q4H PRN, 650 mg at 09/22/24 2004 **OR** acetaminophen (TYLENOL) Suppository 650 mg, 650 mg, Rectal, Q4H PRN, Morillo, Ronn, DO     albuterol (PROVENTIL HFA/VENTOLIN HFA) inhaler, 2 puff, Inhalation, Q6H PRN, Morillo, Ronn, DO, 2 puff at 09/21/24 1546     amLODIPine (NORVASC) tablet 10 mg, 10 mg, Oral, Daily, Morillo, Ronn, DO, 10 mg at 09/22/24 0800     [Held by provider] aspirin (ASA) chewable tablet 81 mg, 81 mg, Oral, Daily, Morillo, Ronn, DO     azaTHIOprine (IMURAN) tablet 50 mg, 50 mg, Oral, Daily, Morillo, Ronn, DO, 50 mg at 09/22/24 0802     azithromycin (ZITHROMAX) 500 mg in sodium chloride 0.9 % 250 mL intermittent infusion, 500 mg, Intravenous, Q24H, Morillo, Ronn, DO, Last Rate: 0 mL/hr at 09/20/24 2151, 500 mg at 09/22/24 2025     calcium  carbonate (TUMS) chewable tablet 1,000 mg, 1,000 mg, Oral, 4x Daily PRN, Morillo, Ronn, DO     cetirizine (zyrTEC) tablet 10 mg, 10 mg, Oral, Daily, Morillo, Ronn, DO, 10 mg at 09/22/24 0802     DULoxetine (CYMBALTA) DR capsule 20 mg, 20 mg, Oral, Daily, Morillo, Ronn, DO, 20 mg at 09/22/24 0800     fluticasone (ARNUITY ELLIPTA) 200 MCG/ACT inhaler 1 puff, 1 puff, Inhalation, Daily, Morillo, Ronn, DO, 1 puff at 09/22/24 0806     hydrALAZINE (APRESOLINE) tablet 10 mg, 10 mg, Oral, Q4H PRN **OR** hydrALAZINE (APRESOLINE) injection 10 mg, 10 mg, Intravenous, Q4H PRN, Morillo, Ronn, DO     levothyroxine (SYNTHROID/LEVOTHROID) tablet 75 mcg, 75 mcg, Oral, QAM AC, Morillo, Ronn, DO, 75 mcg at 09/22/24 0632     lidocaine (LMX4) cream, , Topical, Q1H PRN, Morillo, Ronn, DO     lidocaine (LMX4) cream, , Topical, Q1H PRN, Roly Kimble MD     lidocaine 1 % 0.1-1 mL, 0.1-1 mL, Other, Q1H PRN, Morillo, Ronn, DO     lidocaine 1 % 0.1-5 mL, 0.1-5 mL, Other, Q1H PRN, Roly Kimble MD     [Held by provider] losartan (COZAAR) tablet 75 mg, 75 mg, Oral, Daily, Morillo, Ronn, DO     melatonin tablet 5 mg, 5 mg, Oral, At Bedtime PRN, Morillo, Ronn, DO, 5 mg at 09/22/24 2004     mometasone furoate (ASMANEX HFA) 100 MCG/ACT inhaler 2 puff, 2 puff, Inhalation, BID, Morillo, Ronn, DO, 2 puff at 09/22/24 1916     ondansetron (ZOFRAN ODT) ODT tab 4 mg, 4 mg, Oral, Q6H PRN **OR** ondansetron (ZOFRAN) injection 4 mg, 4 mg, Intravenous, Q6H PRN, Morillo, Ronn, DO     pantoprazole (PROTONIX) EC tablet 40 mg, 40 mg, Oral, BID AC, Morillo, Ronn, DO, 40 mg at 09/22/24 1607     piperacillin-tazobactam (ZOSYN) 3.375 g vial to attach to  mL bag, 3.375 g, Intravenous, Q8H, Morillo, Ronn, DO, 3.375 g at 09/22/24 1745     polyethylene glycol (MIRALAX) Packet 17 g, 17 g, Oral, BID PRN, Morillo, Ronn, DO     potassium chloride 10 mEq in 100 mL sterile water infusion, 10 mEq, Intravenous, Q1H, Roly Kimble MD, Last  Rate: 100 mL/hr at 09/22/24 1955, 10 mEq at 09/22/24 1955     rosuvastatin (CRESTOR) tablet 40 mg, 40 mg, Oral, Daily, Morillo, Ronn, DO, 40 mg at 09/22/24 0802     senna-docusate (SENOKOT-S/PERICOLACE) 8.6-50 MG per tablet 1 tablet, 1 tablet, Oral, BID PRN **OR** senna-docusate (SENOKOT-S/PERICOLACE) 8.6-50 MG per tablet 2 tablet, 2 tablet, Oral, BID PRN, Morillo, Ronn, DO     sirolimus (GENERIC EQUIVALENT) tablet 2 mg, 2 mg, Oral, QPM, Denny Novak MD, 2 mg at 09/22/24 1609     sodium chloride (PF) 0.9% PF flush 10-40 mL, 10-40 mL, Intracatheter, Once PRN, Roly Kimble MD     sodium chloride (PF) 0.9% PF flush 3 mL, 3 mL, Intracatheter, Q8H, Morillo, Ronn, DO, 3 mL at 09/22/24 1911     sodium chloride (PF) 0.9% PF flush 3 mL, 3 mL, Intracatheter, q1 min prn, Morillo, Ronn, DO     vancomycin (VANCOCIN) 1,000 mg in 200 mL dextrose intermittent infusion, 1,000 mg, Intravenous, Q24H, Morillo, Ronn, DO, Last Rate: 200 mL/hr at 09/22/24 1911, 1,000 mg at 09/22/24 1911    IMPRESSION / RECOMMENDATIONS   Assessment:  In summary, Mr Everton Larios is a pleasant 59 year old man with history of ulcerative colitis, chronic anemia, 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     Recommendations/plan:  1.  Anemia with dark-colored stools  - His hemoglobin is pretty much at his baseline.  With Pepto-Bismol and iron being on board reliability of black-colored stools is questionable.  - Hemoccult stool was negative.  No plans to do EGD.  - Given that he is already on a PPI, chances of this being gastritis, esophagitis or peptic ulcer is low.     2.  History of ulcerative colitis  - Patient receiving all his care at the [facility].  He gets colonoscopies every 3 years.     3.  History of Pierce's esophagus  - Patient is already on PPI.  He gets upper endoscopies at the [facility] every 3 years.     4.  Acute on  chronic anemia  - Multifactorial.  - Hemoglobin is above transfusion threshold     5.  Status post heart transplant, constipation, osteoporosis, congestive heart failure  - Management as per primary team.    GI will sign off, please do not hesitate to call with questions or new concerns that may arise. Thank you for involving us in this patient's care.    25 minutes were spent in coordination of care, ordering tests, reviewing labs, documentation, talking to patient/family members and members of healthcare team.    Slick Juarez MD  [facility]  [phone]      This document was created using voice recognition technology. Please excuse any typographical errors that may have occurred, and call with any questions.        Slick Juarez MD, FACP   [facility]  [email or web address]       Thank you for the opportunity to participate in the care of this patient.   Please feel free to call with any questions or concerns.  [phone].       CC: [facility]

## 2024-09-23 NOTE — PLAN OF CARE
Problem: Adult Inpatient Plan of Care  Goal: Plan of Care Review  Description: The Plan of Care Review/Shift note should be completed every shift.  The Outcome Evaluation is a brief statement about your assessment that the patient is improving, declining, or no change.  This information will be displayed automatically on your shift  note.  Outcome: Progressing     Problem: Adult Inpatient Plan of Care  Goal: Absence of Hospital-Acquired Illness or Injury  Intervention: Identify and Manage Fall Risk  Recent Flowsheet Documentation  Taken 9/22/2024 1600 by Christa Merritt RN  Safety Promotion/Fall Prevention:   clutter free environment maintained   lighting adjusted   nonskid shoes/slippers when out of bed   patient and family education   room near nurse's station   room organization consistent   safety round/check completed     Problem: Adult Inpatient Plan of Care  Goal: Absence of Hospital-Acquired Illness or Injury  Intervention: Prevent Skin Injury  Recent Flowsheet Documentation  Taken 9/22/2024 1600 by Christa Merritt RN  Body Position: position changed independently     Problem: Adult Inpatient Plan of Care  Goal: Optimal Comfort and Wellbeing  Intervention: Monitor Pain and Promote Comfort  Recent Flowsheet Documentation  Taken 9/22/2024 1600 by Christa Merritt RN  Pain Management Interventions: medication (see MAR)   Goal Outcome Evaluation:    Fever of 103 degrees, tylenol administered and effective. Pt still having loose stools. UA collected and sent to lab. Pleasant and cooperative.

## 2024-09-24 LAB
ANION GAP SERPL CALCULATED.3IONS-SCNC: 16 MMOL/L (ref 7–15)
BACTERIA BLD CULT: NO GROWTH
BUN SERPL-MCNC: 21 MG/DL (ref 8–23)
CALCIUM SERPL-MCNC: 8 MG/DL (ref 8.8–10.4)
CHLORIDE SERPL-SCNC: 100 MMOL/L (ref 98–107)
CREAT SERPL-MCNC: 2.26 MG/DL (ref 0.67–1.17)
CRP SERPL-MCNC: 143 MG/L
EGFRCR SERPLBLD CKD-EPI 2021: 32 ML/MIN/1.73M2
ERYTHROCYTE [DISTWIDTH] IN BLOOD BY AUTOMATED COUNT: 14.3 % (ref 10–15)
GLUCOSE SERPL-MCNC: 93 MG/DL (ref 70–99)
HCO3 SERPL-SCNC: 15 MMOL/L (ref 22–29)
HCT VFR BLD AUTO: 28.8 % (ref 40–53)
HGB BLD-MCNC: 9.9 G/DL (ref 13.3–17.7)
MAGNESIUM SERPL-MCNC: 1.6 MG/DL (ref 1.7–2.3)
MCH RBC QN AUTO: 27.7 PG (ref 26.5–33)
MCHC RBC AUTO-ENTMCNC: 34.4 G/DL (ref 31.5–36.5)
MCV RBC AUTO: 80 FL (ref 78–100)
MRSA DNA SPEC QL NAA+PROBE: NEGATIVE
PLATELET # BLD AUTO: 158 10E3/UL (ref 150–450)
POTASSIUM SERPL-SCNC: 3.1 MMOL/L (ref 3.4–5.3)
POTASSIUM SERPL-SCNC: 3.3 MMOL/L (ref 3.4–5.3)
POTASSIUM SERPL-SCNC: 3.4 MMOL/L (ref 3.4–5.3)
PROCALCITONIN SERPL IA-MCNC: 0.88 NG/ML
RBC # BLD AUTO: 3.58 10E6/UL (ref 4.4–5.9)
SA TARGET DNA: NEGATIVE
SODIUM SERPL-SCNC: 131 MMOL/L (ref 135–145)
WBC # BLD AUTO: 4.2 10E3/UL (ref 4–11)

## 2024-09-24 PROCEDURE — 99232 SBSQ HOSP IP/OBS MODERATE 35: CPT | Performed by: INTERNAL MEDICINE

## 2024-09-24 PROCEDURE — 250N000012 HC RX MED GY IP 250 OP 636 PS 637: Performed by: STUDENT IN AN ORGANIZED HEALTH CARE EDUCATION/TRAINING PROGRAM

## 2024-09-24 PROCEDURE — 84132 ASSAY OF SERUM POTASSIUM: CPT | Performed by: INTERNAL MEDICINE

## 2024-09-24 PROCEDURE — 80048 BASIC METABOLIC PNL TOTAL CA: CPT | Performed by: INTERNAL MEDICINE

## 2024-09-24 PROCEDURE — 85014 HEMATOCRIT: CPT | Performed by: INTERNAL MEDICINE

## 2024-09-24 PROCEDURE — 83735 ASSAY OF MAGNESIUM: CPT | Performed by: INTERNAL MEDICINE

## 2024-09-24 PROCEDURE — 250N000013 HC RX MED GY IP 250 OP 250 PS 637: Performed by: INTERNAL MEDICINE

## 2024-09-24 PROCEDURE — 250N000011 HC RX IP 250 OP 636: Performed by: INTERNAL MEDICINE

## 2024-09-24 PROCEDURE — 86140 C-REACTIVE PROTEIN: CPT | Performed by: INTERNAL MEDICINE

## 2024-09-24 PROCEDURE — 84145 PROCALCITONIN (PCT): CPT | Performed by: INTERNAL MEDICINE

## 2024-09-24 PROCEDURE — 250N000013 HC RX MED GY IP 250 OP 250 PS 637: Performed by: STUDENT IN AN ORGANIZED HEALTH CARE EDUCATION/TRAINING PROGRAM

## 2024-09-24 PROCEDURE — 87641 MR-STAPH DNA AMP PROBE: CPT | Performed by: INTERNAL MEDICINE

## 2024-09-24 PROCEDURE — 250N000011 HC RX IP 250 OP 636: Performed by: STUDENT IN AN ORGANIZED HEALTH CARE EDUCATION/TRAINING PROGRAM

## 2024-09-24 PROCEDURE — 250N000012 HC RX MED GY IP 250 OP 636 PS 637: Performed by: INTERNAL MEDICINE

## 2024-09-24 PROCEDURE — 120N000004 HC R&B MS OVERFLOW

## 2024-09-24 PROCEDURE — 99233 SBSQ HOSP IP/OBS HIGH 50: CPT | Performed by: INTERNAL MEDICINE

## 2024-09-24 RX ORDER — POTASSIUM CHLORIDE 7.45 MG/ML
10 INJECTION INTRAVENOUS
Status: COMPLETED | OUTPATIENT
Start: 2024-09-24 | End: 2024-09-24

## 2024-09-24 RX ORDER — AZITHROMYCIN 250 MG/1
250 TABLET, FILM COATED ORAL DAILY
Status: COMPLETED | OUTPATIENT
Start: 2024-09-24 | End: 2024-09-25

## 2024-09-24 RX ORDER — DOXYCYCLINE 100 MG/1
100 CAPSULE ORAL EVERY 12 HOURS SCHEDULED
Status: DISCONTINUED | OUTPATIENT
Start: 2024-09-24 | End: 2024-09-25 | Stop reason: HOSPADM

## 2024-09-24 RX ORDER — MAGNESIUM OXIDE 400 MG/1
400 TABLET ORAL EVERY 4 HOURS
Status: COMPLETED | OUTPATIENT
Start: 2024-09-24 | End: 2024-09-24

## 2024-09-24 RX ADMIN — PIPERACILLIN AND TAZOBACTAM 3.38 G: 3; .375 INJECTION, POWDER, FOR SOLUTION INTRAVENOUS at 10:06

## 2024-09-24 RX ADMIN — POTASSIUM CHLORIDE 10 MEQ: 7.46 INJECTION, SOLUTION INTRAVENOUS at 07:50

## 2024-09-24 RX ADMIN — DOXYCYCLINE 100 MG: 100 CAPSULE ORAL at 20:33

## 2024-09-24 RX ADMIN — LEVOTHYROXINE SODIUM 75 MCG: 0.03 TABLET ORAL at 07:44

## 2024-09-24 RX ADMIN — LOSARTAN POTASSIUM 75 MG: 50 TABLET, FILM COATED ORAL at 09:04

## 2024-09-24 RX ADMIN — AMOXICILLIN AND CLAVULANATE POTASSIUM 1 TABLET: 875; 125 TABLET, FILM COATED ORAL at 20:30

## 2024-09-24 RX ADMIN — CETIRIZINE HYDROCHLORIDE 10 MG: 10 TABLET, FILM COATED ORAL at 09:04

## 2024-09-24 RX ADMIN — MAGNESIUM OXIDE TAB 400 MG (241.3 MG ELEMENTAL MG) 400 MG: 400 (241.3 MG) TAB at 14:13

## 2024-09-24 RX ADMIN — POTASSIUM CHLORIDE 10 MEQ: 7.46 INJECTION, SOLUTION INTRAVENOUS at 09:02

## 2024-09-24 RX ADMIN — POTASSIUM CHLORIDE 10 MEQ: 7.46 INJECTION, SOLUTION INTRAVENOUS at 16:17

## 2024-09-24 RX ADMIN — PIPERACILLIN AND TAZOBACTAM 3.38 G: 3; .375 INJECTION, POWDER, FOR SOLUTION INTRAVENOUS at 03:05

## 2024-09-24 RX ADMIN — DULOXETINE HYDROCHLORIDE 20 MG: 20 CAPSULE, DELAYED RELEASE ORAL at 09:03

## 2024-09-24 RX ADMIN — PANTOPRAZOLE SODIUM 40 MG: 40 TABLET, DELAYED RELEASE ORAL at 16:17

## 2024-09-24 RX ADMIN — POTASSIUM CHLORIDE 10 MEQ: 7.46 INJECTION, SOLUTION INTRAVENOUS at 10:07

## 2024-09-24 RX ADMIN — MOMETASONE FUROATE 2 PUFF: 100 AEROSOL RESPIRATORY (INHALATION) at 20:33

## 2024-09-24 RX ADMIN — POTASSIUM CHLORIDE 10 MEQ: 7.46 INJECTION, SOLUTION INTRAVENOUS at 20:29

## 2024-09-24 RX ADMIN — PANTOPRAZOLE SODIUM 40 MG: 40 TABLET, DELAYED RELEASE ORAL at 07:44

## 2024-09-24 RX ADMIN — ROSUVASTATIN CALCIUM 40 MG: 40 TABLET, FILM COATED ORAL at 09:03

## 2024-09-24 RX ADMIN — LIDOCAINE 1 PATCH: 4 PATCH TOPICAL at 20:30

## 2024-09-24 RX ADMIN — AZATHIOPRINE 50 MG: 50 TABLET ORAL at 09:04

## 2024-09-24 RX ADMIN — ASPIRIN 81 MG CHEWABLE TABLET 81 MG: 81 TABLET CHEWABLE at 09:04

## 2024-09-24 RX ADMIN — POTASSIUM CHLORIDE 10 MEQ: 7.46 INJECTION, SOLUTION INTRAVENOUS at 11:50

## 2024-09-24 RX ADMIN — POTASSIUM CHLORIDE 10 MEQ: 7.46 INJECTION, SOLUTION INTRAVENOUS at 17:41

## 2024-09-24 RX ADMIN — MOMETASONE FUROATE 2 PUFF: 100 AEROSOL RESPIRATORY (INHALATION) at 07:51

## 2024-09-24 RX ADMIN — MAGNESIUM OXIDE TAB 400 MG (241.3 MG ELEMENTAL MG) 400 MG: 400 (241.3 MG) TAB at 17:36

## 2024-09-24 RX ADMIN — FLUTICASONE FUROATE 1 PUFF: 200 POWDER RESPIRATORY (INHALATION) at 08:01

## 2024-09-24 RX ADMIN — POTASSIUM CHLORIDE 10 MEQ: 7.46 INJECTION, SOLUTION INTRAVENOUS at 18:43

## 2024-09-24 RX ADMIN — AZITHROMYCIN DIHYDRATE 250 MG: 250 TABLET, FILM COATED ORAL at 14:13

## 2024-09-24 RX ADMIN — AMLODIPINE BESYLATE 10 MG: 5 TABLET ORAL at 09:04

## 2024-09-24 RX ADMIN — SIROLIMUS 2 MG: 1 TABLET, SUGAR COATED ORAL at 18:42

## 2024-09-24 ASSESSMENT — ACTIVITIES OF DAILY LIVING (ADL)
ADLS_ACUITY_SCORE: 22

## 2024-09-24 NOTE — PROGRESS NOTES
"[facility] Progress Note    09/24/2024     Chart reviewed  Patient seen and updated  Discussed with hospitalist      ASSESSMENT:  Fever  Isolated LLL PNA  S/ P Heart Transplant  Hx of Ulcerative Colitis  Resp PCR panel, Legionella negative  Parvovirus pending    RECOMMENDATION:    On Azithromycin, zosyn and vancomycin  Switched azithromycin to PO to complete a 5 days course  Augmentin and Doxycycline x 10 days  Stop vancomycin if MRSA nares negative  Augmentin and Doxycyline, renal dosing per Pharm. Duration 10 days with follow up with PCP and heart transplant team at [facility]  [facility]  Follow- patient hoping to discharge on 9/24/24  Needs Heart Transplant Clinic follow up at [facility] for management of immunosuppression    Please see previous notes by SIL Moreno MD  Updated patient and hospitalist   Anticipate discharge on 9/25/24 depending on clinical response     Sonya Jauregui MD  [facility]  Answering Service: [phone]  On-Call ID provider: [phone], option: 9      SUBJECTIVE: chart reviewed  Overall better  Discussed abx plan    Previous note   cough sounds wet, but a sample set did not satisfy dx criteria.  I am going to check legionella given the low NA.  Fevers better but not near normal yet.        REVIEW OF SYSTEMS:  Negative unless as listed above.  Social history, Family history, Medications: reviewed.    OBJECTIVE:  BP (!) 140/83 (BP Location: Right arm)   Pulse 113   Temp 98.7  F (37.1  C) (Oral)   Resp 16   Ht 1.727 m (5' 8\")   Wt 85.8 kg (189 lb 3.2 oz)   SpO2 97%   BMI 28.77 kg/m                PHYSICAL EXAM:  Alert, awake  Vitals tabulated above, reviewed  Neck supple without lymphadenopathy  Sclera normal color, not injected  Previously   CARDIOVASCULAR regular rate and rhythm, no murmur  Lungs some ronchi  Abdomen soft, NT/ND, absent HEPATOSPLENOMEGALY  Skin normal  Joints normal  Neurologic exam non " "focal    Antibiotics:  V/z/macrolide  Pertinent labs:    Recent Labs   Lab Test 09/24/24  0615 09/23/24  0627 09/22/24  0641   WBC 4.2 3.8* 4.6   HGB 9.9* 9.9* 10.3*   HCT 28.8* 28.8* 30.2*   MCV 80 82 82    132* 131*       Lab Results   Component Value Date    RBC 3.70 09/22/2024    RBC 4.61 04/09/2021     Lab Results   Component Value Date    HGB 10.3 09/22/2024    HGB 11.2 04/09/2021     Lab Results   Component Value Date    HCT 30.2 09/22/2024    HCT 34.6 04/09/2021     No components found for: \"MCT\"  Lab Results   Component Value Date    MCV 82 09/22/2024    MCV 75 04/09/2021     Lab Results   Component Value Date    MCH 27.8 09/22/2024    MCH 24.3 04/09/2021     Lab Results   Component Value Date    MCHC 34.1 09/22/2024    MCHC 32.4 04/09/2021     Lab Results   Component Value Date    RDW 14.0 09/22/2024    RDW 14.9 04/09/2021     Lab Results   Component Value Date     09/22/2024     04/09/2021       Last Comprehensive Metabolic Panel:  Sodium   Date Value Ref Range Status   09/24/2024 131 (L) 135 - 145 mmol/L Final   03/17/2021 137 133 - 144 mmol/L Final     Potassium   Date Value Ref Range Status   09/24/2024 3.3 (L) 3.4 - 5.3 mmol/L Final   05/28/2022 4.4 3.4 - 5.3 mmol/L Final   03/17/2021 3.9 3.4 - 5.3 mmol/L Final     Chloride   Date Value Ref Range Status   09/24/2024 100 98 - 107 mmol/L Final   05/28/2022 107 94 - 109 mmol/L Final   03/17/2021 106 94 - 109 mmol/L Final     Carbon Dioxide   Date Value Ref Range Status   03/17/2021 24 20 - 32 mmol/L Final     Carbon Dioxide (CO2)   Date Value Ref Range Status   09/24/2024 15 (L) 22 - 29 mmol/L Final   05/28/2022 24 20 - 32 mmol/L Final     Anion Gap   Date Value Ref Range Status   09/24/2024 16 (H) 7 - 15 mmol/L Final   05/28/2022 6 3 - 14 mmol/L Final   03/17/2021 8 3 - 14 mmol/L Final     Glucose   Date Value Ref Range Status   09/24/2024 93 70 - 99 mg/dL Final   05/28/2022 97 70 - 99 mg/dL Final   03/17/2021 85 70 - 99 mg/dL Final "     Urea Nitrogen   Date Value Ref Range Status   09/24/2024 21.0 8.0 - 23.0 mg/dL Final   05/28/2022 31 (H) 7 - 30 mg/dL Final   03/17/2021 32 (H) 7 - 30 mg/dL Final     Creatinine   Date Value Ref Range Status   09/24/2024 2.26 (H) 0.67 - 1.17 mg/dL Final   03/17/2021 1.80 (H) 0.66 - 1.25 mg/dL Final     GFR Estimate   Date Value Ref Range Status   09/24/2024 32 (L) >60 mL/min/1.73m2 Final     Comment:     eGFR calculated using 2021 CKD-EPI equation.   06/22/2021 34 (L) >60 mL/min/1.73m2 Final   03/17/2021 41 (L) >60 mL/min/[1.73_m2] Final     Comment:     Non  GFR Calc  Starting 12/18/2018, serum creatinine based estimated GFR (eGFR) will be   calculated using the Chronic Kidney Disease Epidemiology Collaboration   (CKD-EPI) equation.       Calcium   Date Value Ref Range Status   09/24/2024 8.0 (L) 8.8 - 10.4 mg/dL Final     Comment:     Reference intervals for this test were updated on 7/16/2024 to reflect our healthy population more accurately. There may be differences in the flagging of prior results with similar values performed with this method. Those prior results can be interpreted in the context of the updated reference intervals.   03/17/2021 9.0 8.5 - 10.1 mg/dL Final       Liver Function Studies -   Recent Labs   Lab Test 09/22/24  0641   PROTTOTAL 6.5   ALBUMIN 3.0*   BILITOTAL 0.5   ALKPHOS 115   AST 44   ALT 30       Sed Rate   Date Value Ref Range Status   09/23/2019 17 0 - 20 mm/h Final       CRP Cardiac Risk   Date Value Ref Range Status   10/22/2018 12.5 mg/L Final     Comment:     Reference Values:  Low Risk:           <1.0 mg/L  Average Risk:       1.0-3.0 mg/L  High Risk:          >3.0 mg/L  Acute Inflammation: >8.0 mg/L       CRP Inflammation   Date Value Ref Range Status   09/24/2019 <2.9 0.0 - 8.0 mg/L Final               MICROBIOLOGY DATA:    7-Day Micro Results       Collected Updated Procedure Result Status      09/23/2024 1419 09/23/2024 2058 Respiratory Panel PCR  [[id number]]    Swab from Nasopharyngeal    Final result Component Value   Adenovirus Not Detected   Coronavirus Not Detected   This test detects Coronavirus 229E, HKU1, NL63 and OC43 but does not distinguish between them. It does not detect MERS ( Respiratory Syndrome), SARS (Severe Acute Respiratory Syndrome) or 2019-nCoV (Novel 2019) Coronavirus.   Human Metapneumovirus Not Detected   Human Rhin/Enterovirus Not Detected   Influenza A Not Detected   Influenza A, H1 Not Detected   Influenza A 2009 H1N1 Not Detected   Influenza A, H3 Not Detected   Influenza B Not Detected   Parainfluenza Virus 1 Not Detected   Parainfluenza Virus 2 Not Detected   Parainfluenza Virus 3 Not Detected   Parainfluenza Virus 4 Not Detected   Respiratory Syncytial Virus A Not Detected   Respiratory Syncytial Virus B Not Detected   Chlamydia Pneumoniae Not Detected   Mycoplasma Pneumoniae Not Detected            09/22/2024 1735 09/22/2024 2140 Legionella Urinary Antigen and Streptococcus pneumoniae antigen [[id number]]    Urine, Voided    Final result Component Value   Legionella pneumophila serogroup 1 urinary antigen Negative   A negative result does not exclude the possibility of a Legionella infection, as it can be caused by other serogroups and species of Legionella.   Streptococcus pneumoniae antigen Negative   A negative result does not exclude a Streptococcus pneumoniae infection.   Legionella pneumophila Urinary/Strep pneumoniae Antigen Specimen Type Urine            09/22/2024 0836 09/22/2024 1352 Respiratory Aerobic Bacterial Culture with Gram Stain [[id number]]   Sputum from Expectorate    Final result Component Value   Culture >10 Squamous epithelial cells/low power field indicates oral contamination. Please recollect.   Gram Stain Result >10 Squamous epithelial cells/low power field    <25 PMNs/low power field    4+ Mixed nima               09/21/2024 0059 09/21/2024 0436 Legionella Urinary Antigen and  Streptococcus pneumoniae antigen [[id number]]    Urine, Midstream    Final result Component Value   Legionella pneumophila serogroup 1 urinary antigen Negative   A negative result does not exclude the possibility of a Legionella infection, as it can be caused by other serogroups and species of Legionella.   Streptococcus pneumoniae antigen Negative   A negative result does not exclude a Streptococcus pneumoniae infection.   Legionella pneumophila Urinary/Strep pneumoniae Antigen Specimen Type Urine            09/20/2024 2139 09/22/2024 2135 1,3 Beta D glucan fungitell [[id number]]   Peripheral Blood    Final result Component Value Units   (1,3)-Beta-D-Glucan <31 pg/mL   B-D GLUCAN INTERPRETATION (1,3) Negative    INTERPRETIVE INFORMATION: (1,3)-beta-D-glucan (Fungitell)      Less than 31 pg/mL ................... Negative    31-59 pg/mL .......................... Negative    60-79 pg/mL .......................... Indeterminate    Greater than or equal to 80 pg/mL .... Positive    The Fungitell test is indicated for presumptive diagnosis   of fungal infection and should be used in conjunction with   other diagnostic procedures. This test does not detect   certain fungal species such as Cryptococcus, which produce   very low levels of (1,3)-beta-D-glucan. This test will not   detect the zygomycetes, such as Absidia, Mucor, and   Rhizopus, which are not known to produce   (1,3)-beta-D-glucan. In addition, the yeast phase of   Blastomyces dermatitidis produces little   (1,3)-beta-D-glucan and may not be detected by the assay.  Performed By: [facility]  [address] [place] [id number]  : Myles Soliz MD, PhD  CLIA Number: [id number]            09/20/2024 1751 09/23/2024 1528 Microsporidia stool [[id number]]   Stool from Per Rectum    Final result Component Value   Microsporidia Stain Negative     A single negative result does not rule out the possibility   of a microsporidia  infection.  Performed By: [facility]  [address] [place] [id number]  : Myles Soliz MD, PhD  CLIA Number: [id number]            09/20/2024 1751 09/20/2024 2216 C. difficile Toxin B PCR with reflex to C. difficile Antigen and Toxins A/B EIA [[id number]]    Stool from Per Rectum    Final result Component Value   C Difficile Toxin B by PCR Negative   A negative result does not exclude actual disease due to C. difficile and may be due to improper collection, handling and storage of the specimen or the number of organisms in the specimen is below the detection limit of the assay.            09/20/2024 1751 09/20/2024 2300 Enteric Bacteria and Virus Panel by STEFANY Stool (Salmonella, Shigella and Campylobacter, Shiga Toxin 1&2, Yersinia enterocolitica, Vibrio, Norovirus and Rotavirus) [[id number]]    Stool from Per Rectum    Final result Component Value   Campylobacter species Negative   Salmonella species Negative   Vibrio species Negative   Vibrio cholerae Negative   Yersinia enterocolitica Negative   Enteropathogenic E. coli (EPEC) Negative   Shiga-like toxin-producing E. coli (STEC) Negative   Shigella/Enteroinvasive E. coli (EIEC) Negative   Cryptosporidium species Negative   Giardia lamblia Negative   Norovirus Gl/Gll Negative   Rotavirus A Negative   Plesiomonas shigelloides Negative   Enteroaggregative E. coli (EAEC) Negative   Enterotoxigenic E. coli (ETEC) Negative   E. coli O157 NA   Cyclospora cayetanensis Negative   Entamoeba histolytica Negative   Adenovirus F40/41 Negative   Astrovirus Negative   Sapovirus Negative            09/20/2024 1751 09/23/2024 1406 Cryptosporidium in stool stain [[id number]]    Stool from Per Rectum    Final result Component Value   Cryptosporidium oocysts Not Found   Cyclospora cayetanenis Not Found   Cystoisospora fermin Not Found            09/20/2024 1633 09/23/2024 1946 Blood Culture Peripheral Blood [[id number]]   Peripheral  Blood    Preliminary result Component Value   Culture No growth after 3 days  [P]                09/20/2024 1601 09/20/2024 1644 Symptomatic Influenza A/B, RSV, & SARS-CoV2 PCR (COVID-19) Nasopharyngeal [[id number]]    Swab from Nasopharyngeal    Final result Component Value   Influenza A PCR Negative   Influenza B PCR Negative   RSV PCR Negative   SARS CoV2 PCR Negative   NEGATIVE: SARS-CoV-2 (COVID-19) RNA not detected, presumed negative.            09/19/2024 1600 09/23/2024 1931 Blood Culture Peripheral Blood [[id number]]   Peripheral Blood    Preliminary result Component Value   Culture No growth after 4 days  [P]                09/19/2024 1600 09/20/2024 1237 Cytomegalovirus DNA by PCR, Quantitative [[id number]]    Blood from Arm, Left    Final result Component Value Units   CMV DNA IU/mL Not Detected IU/mL            09/19/2024 1600 09/20/2024 1712 Sonya Barr Virus Quantitative PCR, Plasma [[id number]]    Blood from Arm, Left    Final result Component Value Units   EBV DNA IU/mL Not Detected IU/mL                          IMAGING/RADIOLOGY:    CT Abdomen Pelvis w/o Contrast    Result Date: 9/21/2024  EXAM: CT ABDOMEN PELVIS W/O CONTRAST LOCATION: [facility] DATE: 9/21/2024 INDICATION: microhematuria, renal failure, cardiac transplant COMPARISON: None. TECHNIQUE: CT scan of the abdomen and pelvis was performed without IV contrast. Multiplanar reformats were obtained. Dose reduction techniques were used. CONTRAST: None. FINDINGS: LOWER CHEST: Patchy airspace consolidation noted at the left lung base. HEPATOBILIARY: Small hepatic cysts. Liver otherwise unremarkable. Gallbladder within normal limits. PANCREAS: Normal. SPLEEN: Normal. ADRENAL GLANDS: Normal. KIDNEYS/BLADDER: No significant mass, stone, or hydronephrosis. BOWEL: No obstruction or inflammatory change. LYMPH NODES: Normal. VASCULATURE: Moderate atherosclerotic disease of the abdominal aorta and its branches. PELVIC  ORGANS: Bladder within normal limits.  Tiny fat-containing right inguinal hernia. MUSCULOSKELETAL: Degenerative changes of the spine.     IMPRESSION: 1.  No acute findings in the abdomen and pelvis. No stones are identified.     IR PICC Placement > 5 Yrs of Age    Result Date: 9/21/2024  [facility] LOCATION: [facility] DATE: 9/21/2024 PROCEDURE: PERIPHERALLY INSERTED CENTRAL CATHETER (PICC) PLACEMENT INTERVENTIONAL RADIOLOGIST: Dewayne Desai MD. INDICATION: Inadequate IV access. History of heart transplant and left-sided SVC. Air Kerma:   65 mGy FLUOROSCOPIC TIME: 1.3 minutes Contrast: 3 cc Omnipaque 350 COMPLICATIONS: No immediate complications. STERILE BARRIER TECHNIQUE: Maximum sterile barrier technique was used. Cutaneous antisepsis was performed at the operative site with application of 2% chlorhexidine and large sterile drape. Prior to the procedure, the surgeon and assistant performed hand  hygiene and wore hat, mask, sterile gown, and sterile gloves during the entire procedure. PROCEDURE/TECHNIQUE: Ultrasound revealed a patent and compressible left basilic vein, and an ultrasound image was obtained and placed in the patient's permanent medical record. The left upper arm was prepped and draped in usual sterile fashion followed by local anesthesia with 1% Xylocaine. Using real-time ultrasound guidance, the left basilic vein was accessed. Left upper extremity venogram was performed through the peel-away sheath. A 5 Icelandic, 36 cm, double-lumen power PICC Solo peripherally inserted central catheter was placed under fluoroscopic guidance with its tip near the caval atrial junction. FINDINGS: Left upper extremity venogram: Patent left axillary and subclavian veins with outflow via left-sided SVC. Ultrasound shows an anechoic and compressible left basilic vein.  The completion fluoroscopic images show the catheter tip to lie in the left-sided SVC.     IMPRESSION:  Successful  placement of PICC line.     Echocardiogram Complete    Result Date: 2024  [id number]^MARIANGEL^[place] [address]  Name: ANA ROSA BALDWIN MRN: [id number] : 1965 Study Date: 2024 09:38 AM Age: 59 yrs Gender: Male Patient Location: [facility] Reason For Study: Tachycardia Ordering Physician: JAMES AUGUST Referring Physician: JAMES AUGUST Performed By:   BSA: 2.0 m2 Height: 68 in Weight: 191 lb HR: 117 ______________________________________________________________________________ Procedure Complete Echo Adult. Definity (NDC #36114-755) given intravenously. ______________________________________________________________________________ Interpretation Summary  Left ventricular size, wall motion and function are normal. The ejection fraction is 60-65%. Mildly decreased right ventricular systolic function The rhythm was sinus tachycardia. Right ventricular systolic pressure could not be approximated due to inadequate tricuspid regurgitation. No hemodynamically significant valvular abnormalities on 2D or color flow imaging. ______________________________________________________________________________ Left Ventricle Left ventricular size, wall motion and function are normal. The ejection fraction is 60-65%. There is normal left ventricular wall thickness. Left ventricular diastolic function is normal. No regional wall motion abnormalities noted.  Right Ventricle The right ventricle is normal size. Mildly decreased right ventricular systolic function. TAPSE is abnormal, which is consistent with abnormal right ventricular systolic function.  Atria The left atrium is moderate to severely dilated. Right atrial size is normal. There is no color Doppler evidence of an atrial shunt.  Mitral Valve Mitral valve leaflets appear normal. There is no evidence of mitral stenosis or clinically significant mitral regurgitation.  Tricuspid Valve The  tricuspid valve is not well visualized, but is grossly normal. Right ventricular systolic pressure could not be approximated due to inadequate tricuspid regurgitation. There is trace tricuspid regurgitation.  Aortic Valve Aortic valve leaflets appear normal. There is no evidence of aortic stenosis or clinically significant aortic regurgitation.  Pulmonic Valve The pulmonic valve is not well seen, but is grossly normal. There is trace pulmonic valvular regurgitation.  Vessels The aorta root is normal. Normal size ascending aorta. IVC diameter <2.1 cm collapsing >50% with sniff suggests a normal RA pressure of 3 mmHg.  Pericardium There is no pericardial effusion.  Rhythm The rhythm was sinus tachycardia. ______________________________________________________________________________ MMode/2D Measurements & Calculations  IVSd: 0.89 cm LVIDd: 4.3 cm LVIDs: 3.0 cm LVPWd: 0.94 cm FS: 30.9 % LV mass(C)d: 127.7 grams LV mass(C)dI: 63.7 grams/m2 Ao root diam: 3.0 cm LA dimension: 4.1 cm asc Aorta Diam: 3.1 cm LA/Ao: 1.4 LVOT diam: 2.1 cm LVOT area: 3.5 cm2 Ao root diam index Ht(cm/m): 1.7 Ao root diam index BSA (cm/m2): 1.5 Asc Ao diam index BSA (cm/m2): 1.5 Asc Ao diam index Ht(cm/m): 1.8 LA Volume (BP): 52.5 ml  LA Volume Indexed (AL/bp): 27.2 ml/m2 RV Base: 3.6 cm RWT: 0.43 TAPSE: 1.2 cm  Doppler Measurements & Calculations MV E max ata: 89.6 cm/sec MV max PG: 3.9 mmHg MV mean P.0 mmHg MV V2 VTI: 13.1 cm MVA(VTI): 2.8 cm2 MV dec slope: 545.0 cm/sec2 MV dec time: 0.16 sec Ao V2 max: 110.0 cm/sec Ao max P.0 mmHg Ao V2 mean: 71.8 cm/sec Ao mean P.0 mmHg Ao V2 VTI: 16.9 cm ALONZO(I,D): 2.2 cm2 ALONZO(V,D): 2.3 cm2 LV V1 max P.2 mmHg LV V1 max: 73.4 cm/sec LV V1 VTI: 10.5 cm SV(LVOT): 36.4 ml SI(LVOT): 18.1 ml/m2 PA acc time: 0.11 sec AV Ata Ratio (DI): 0.67  ALONZO Index (cm2/m2): 1.1 E/E' av.1 Lateral E/e': 6.9 Medial E/e': 7.2 RV S Ata: 9.3 cm/sec   ______________________________________________________________________________ Report approved by: Marva Oakes 09/21/2024 02:46 PM       Chest CT w/o contrast    Result Date: 9/20/2024  EXAM: CT CHEST W/O CONTRAST LOCATION: [facility] DATE: 9/20/2024 INDICATION: Fever, shortness of breath. COMPARISON: Chest 10/20/2022. TECHNIQUE: CT chest without IV contrast. Multiplanar reformats were obtained. Dose reduction techniques were used. CONTRAST: None. FINDINGS: LUNGS AND PLEURA: Consolidative opacities with air bronchograms in the left lower lobe. Scattered punctate calcified granulomas in the right lower lobe. A 3 mm nodule in the right upper lobe (4/#136) is new since the prior exam. A 2 mm left upper lobe nodule at the apex (4/#45) is unchanged. MEDIASTINUM/AXILLAE: No enlarged thoracic lymph node. Postoperative changes from heart transplantation with a left brachiocephalic vein stent. Patency of the stent is not assessed without contrast material. No pericardial effusion. Surgical clips within the left axilla and mediastinum. CORONARY ARTERY CALCIFICATION: None. UPPER ABDOMEN: Small left hepatic cyst. Unchanged small benign right adrenal myelolipoma. MUSCULOSKELETAL: Median sternotomy wires. Unchanged L1 compression deformity. Multilevel degenerative changes of the spine. No acute bony abnormalities.     IMPRESSION: 1.  Left lower lobe pneumonia. 2.  A 3 mm right upper lobe nodule is new since 10/28/2022. See follow-up guidelines below. REFERENCE: Guidelines for Management of Incidental Pulmonary Nodules Detected on CT Images: From the Fleischner Society 2017. Guidelines apply to incidental nodules in patients who are 35 years or older. Guidelines do not apply to lung cancer screening, patients with immunosuppression, or patients with known primary cancer. SINGLE NODULE Nodule size <6 mm Low-risk patients: No follow-up needed. High-risk patients: Optional follow-up at 12  months.    XR Chest Port 1 View    Result Date: 9/20/2024  EXAM: XR CHEST PORT 1 VIEW LOCATION: [facility] DATE: 09/20/2024 INDICATION: Short of breath. COMPARISON: None.     IMPRESSION: Sternotomy. Heart size within normal limits. Stable soft tissue prominence in the upper mediastinum. Old right rib fracture. Clips in the left axilla. No pulmonary infiltrates.      Medical Decision Making       MANAGEMENT DISCUSSED with the following over the past 24 hours: patient, hospitalist   NOTE(S)/MEDICAL RECORDS REVIEWED over the past 24 hours: reviewed  Tests ORDERED & REVIEWED in the past 24 hours:  - See lab/imaging results included in the data section of the note  Medical complexity over the past 24 hours:  - Prescription DRUG MANAGEMENT performed

## 2024-09-24 NOTE — PLAN OF CARE
Problem: Adult Inpatient Plan of Care  Goal: Plan of Care Review  Description: The Plan of Care Review/Shift note should be completed every shift.  The Outcome Evaluation is a brief statement about your assessment that the patient is improving, declining, or no change.  This information will be displayed automatically on your shift  note.  Outcome: Progressing     Problem: Pneumonia  Goal: Fluid Balance  Outcome: Progressing  Intervention: Monitor and Manage Fluid Balance  Recent Flowsheet Documentation  Taken 9/24/2024 0500 by Tana Carnes RN  Fluid/Electrolyte Management: fluids restricted  Taken 9/23/2024 2100 by Tana Carnes RN  Fluid/Electrolyte Management: fluids restricted   Goal Outcome Evaluation:       Pt Is alert and oriented x 4, denies pain. Independent except with IV pool in use. Pt continues on antibiotics, Patient continue to be   on droplet precautions while pending Parvovirus rule out.Potassium continues to be low after  Iv potasium  replacement. VSS, sinus rhythm on tele. Pt expressed the wish to go home because he is not getting enough sleep around  here.Pt is getting a couple of IV antibiotics, and IV potassium which needs constant monitoring and replacing.Pt does not like frequent visits or beeps in the room. Will continue to monitor pt.

## 2024-09-24 NOTE — PROGRESS NOTES
[facility]    PROGRESS NOTE - Hospitalist Service    Assessment and Plan  59 year old male with a complex past medical history of atrial fibrillation, ulcerative colitis, hypothyroidism, heart transplant and history of ASD s/p repair as a child who presented to the emergency room with fever and is admitted on 9/20/2024 with pneumonia and sepsis     Pneumonia with sepsis  - Patient has persistent fever on admission  - He is immunocompromised and on immunosuppressant drugs.   - lactate normal at 0.9, procal mildly elevated and CRP very elevated   - UA not concerning for infection; noting ketones, proteinuria and blood  - CXR unrevealing and viral panel negative   - Negative strep and legionella Ag  - CT scan of chest shows left lower lobe pneumonia  - C-diff, enteric panel, cryptosporidium and microsporida are negative  - continue empiric IV antibiotic with vanc and zosyn for now plus azithromycin for atypical coverage  - ID consult, appreciate input  - Unremarkable echo for acute changes.  EF of 60 to 65%,   - if issues noted will need transfer to [place] per Dr. Lemos of transplant team   - Negative sputum culture  - Negative Legionella   -Start downgrading antibiotic as per ID, discontinue vancomycin, start doxycycline and continue Zosyn  -Plan for oral antibiotic on discharge tomorrow as per ID     Melena and hematochezia  - patient states he has noticed dark stool and blood in stool over last few days, no abdominal pain   -GI consulted, question possible scope and bx to assess for ?CMV colitis   - Negative Hemoccult stool  - Continue to monitor hemoglobin  - Unremarkable C. difficile and stool culture     Hematuria  - UA as noted for hematuria.  - urology consult, appreciate input  - non contrast CT of abdomen pelvis as per urology is unremarkable for acute changes.     Anion gap acidosis  Hyponatremia  - likely 2/2 to CKD history and starvation ketosis as pt not eating much.   -  Lactate normal. UA with ketones   - D/continue IV fluids and encourage oral intake  -Improving  - Continue to monitor BMP     A-fib s/p ablation  - mildly elevated BNP with normal troponin, not hypervolemic on exam  - holding aspirin because of hematuria initially, restart now  - Continue home medications.  - Continue to monitor on telemetry     Acute kidney injury   -Baseline CKD stage III  -Creatinine is above baseline on admission  -Improving with IV fluid, plan to discontinue when diet is advanced  -Continue to hold losartan for now, restart tomorrow if creatinine stable     Chronic anemia  - Of chronic illness  - hgb stable  - Continue to monitor hemoglobin     Hx of heart transplant  Ulcerative colitis  -continue PTA sirolimus, dose reduce azathioprine to 50mg   -TTE as noted above  - GI consulted, appreciate  -Patient will follow-up with heart transplant clinic as outpatient     Acute on chronic hyponatremia  -Because of dehydration  -Improving with IV fluid.  -Continue to monitor sodium level.\     Hypokalemia   -  Replace per protocol   - Level still low  - Unremarkable mag level  -   40 MINUTES SPENT BY ME on the date of service doing chart review, history, exam, documentation & further activities per the note    Active Problems:    Transplanted heart (H)    MATA (dyspnea on exertion)    Tachycardia    History of heart transplant (H)    Fever, unspecified fever cause    Other fatigue      VTE prophylaxis:  Pneumatic Compression Devices  DIET: Orders Placed This Encounter      Low Saturated Fat Na <2400 mg      Disposition/Barriers to discharge: IV antibiotic, ID clearance, anticipate discharge tomorrow  Code Status: Full Code    Subjective:  [place] is feeling much better today, no fever or chills overnight, wants to go home.    PHYSICAL EXAM  Vitals:    09/22/24 0312 09/23/24 0630 09/24/24 0403   Weight: 87.1 kg (192 lb) 86.3 kg (190 lb 3.2 oz) 85.8 kg (189 lb 3.2 oz)     B/P:119/72 T:98.4 P:108 R:18      Intake/Output Summary (Last 24 hours) at 9/24/2024 1639  Last data filed at 9/24/2024 1602  Gross per 24 hour   Intake 1623 ml   Output --   Net 1623 ml      Body mass index is 28.77 kg/m .    Constitutional: awake, alert, cooperative, no apparent distress, and appears stated age  Respiratory: No increased work of breathing, good air exchange, clear to auscultation bilaterally, no crackles or wheezing  Cardiovascular: Normal apical impulse, regular rate and rhythm, normal S1 and S2, no S3 or S4, and no murmur noted  GI: No scars, normal bowel sounds, soft, non-distended, non-tender, no masses palpated, no hepatosplenomegally  Skin: no bruising or bleeding and normal skin color, texture, turgor  Musculoskeletal: There is no redness, warmth, or swelling of the joints.  Full range of motion noted.  no lower extremity pitting edema present  Neurologic: Awake, alert, oriented to name, place and time.  Cranial nerves II-XII are grossly intact.  Motor is 5 out of 5 bilaterally.   Sensory is intact.    Neuropsychiatric: Appropriate with examiner      PERTINENT LABS/IMAGING:    I have personally reviewed the following data over the past 24 hrs:    4.2  \   9.9 (L)   / 158     131 (L) 100 21.0 /  93   3.1 (L) 15 (L) 2.26 (H) \     Procal: 0.88 (H) CRP: 143.00 (H) Lactic Acid: N/A         Imaging results reviewed over the past 24 hrs:   No results found for this or any previous visit (from the past 24 hour(s)).    Discussed with patient, family, ID, nursing staff and discharge planner    Roly Kimble MD  [facility] Medicine Service  [phone]

## 2024-09-25 VITALS
RESPIRATION RATE: 24 BRPM | HEART RATE: 112 BPM | WEIGHT: 186 LBS | SYSTOLIC BLOOD PRESSURE: 126 MMHG | BODY MASS INDEX: 28.19 KG/M2 | OXYGEN SATURATION: 99 % | TEMPERATURE: 98.5 F | HEIGHT: 68 IN | DIASTOLIC BLOOD PRESSURE: 72 MMHG

## 2024-09-25 LAB
ANION GAP SERPL CALCULATED.3IONS-SCNC: 13 MMOL/L (ref 7–15)
B19V DNA SER QL NAA+PROBE: NOT DETECTED
BACTERIA BLD CULT: NO GROWTH
BUN SERPL-MCNC: 20.6 MG/DL (ref 8–23)
CALCIUM SERPL-MCNC: 8.6 MG/DL (ref 8.8–10.4)
CHLORIDE SERPL-SCNC: 104 MMOL/L (ref 98–107)
CREAT SERPL-MCNC: 2.23 MG/DL (ref 0.67–1.17)
EGFRCR SERPLBLD CKD-EPI 2021: 33 ML/MIN/1.73M2
ERYTHROCYTE [DISTWIDTH] IN BLOOD BY AUTOMATED COUNT: 14.3 % (ref 10–15)
GLUCOSE SERPL-MCNC: 95 MG/DL (ref 70–99)
HCO3 SERPL-SCNC: 17 MMOL/L (ref 22–29)
HCT VFR BLD AUTO: 29.1 % (ref 40–53)
HGB BLD-MCNC: 10.3 G/DL (ref 13.3–17.7)
MAGNESIUM SERPL-MCNC: 1.9 MG/DL (ref 1.7–2.3)
MCH RBC QN AUTO: 28.7 PG (ref 26.5–33)
MCHC RBC AUTO-ENTMCNC: 35.4 G/DL (ref 31.5–36.5)
MCV RBC AUTO: 81 FL (ref 78–100)
PLATELET # BLD AUTO: 193 10E3/UL (ref 150–450)
POTASSIUM SERPL-SCNC: 4 MMOL/L (ref 3.4–5.3)
POTASSIUM SERPL-SCNC: 4.2 MMOL/L (ref 3.4–5.3)
RBC # BLD AUTO: 3.59 10E6/UL (ref 4.4–5.9)
SODIUM SERPL-SCNC: 134 MMOL/L (ref 135–145)
WBC # BLD AUTO: 4.7 10E3/UL (ref 4–11)

## 2024-09-25 PROCEDURE — 85027 COMPLETE CBC AUTOMATED: CPT | Performed by: INTERNAL MEDICINE

## 2024-09-25 PROCEDURE — 250N000011 HC RX IP 250 OP 636: Performed by: INTERNAL MEDICINE

## 2024-09-25 PROCEDURE — 84132 ASSAY OF SERUM POTASSIUM: CPT | Performed by: INTERNAL MEDICINE

## 2024-09-25 PROCEDURE — 250N000012 HC RX MED GY IP 250 OP 636 PS 637: Performed by: STUDENT IN AN ORGANIZED HEALTH CARE EDUCATION/TRAINING PROGRAM

## 2024-09-25 PROCEDURE — 250N000013 HC RX MED GY IP 250 OP 250 PS 637: Performed by: STUDENT IN AN ORGANIZED HEALTH CARE EDUCATION/TRAINING PROGRAM

## 2024-09-25 PROCEDURE — 250N000013 HC RX MED GY IP 250 OP 250 PS 637: Performed by: INTERNAL MEDICINE

## 2024-09-25 PROCEDURE — 80048 BASIC METABOLIC PNL TOTAL CA: CPT | Performed by: INTERNAL MEDICINE

## 2024-09-25 PROCEDURE — 99239 HOSP IP/OBS DSCHRG MGMT >30: CPT | Performed by: INTERNAL MEDICINE

## 2024-09-25 PROCEDURE — 83735 ASSAY OF MAGNESIUM: CPT | Performed by: INTERNAL MEDICINE

## 2024-09-25 RX ORDER — MAGNESIUM SULFATE HEPTAHYDRATE 40 MG/ML
2 INJECTION, SOLUTION INTRAVENOUS ONCE
Status: COMPLETED | OUTPATIENT
Start: 2024-09-25 | End: 2024-09-25

## 2024-09-25 RX ORDER — DOXYCYCLINE 100 MG/1
100 CAPSULE ORAL EVERY 12 HOURS
Qty: 18 CAPSULE | Refills: 0 | Status: SHIPPED | OUTPATIENT
Start: 2024-09-25

## 2024-09-25 RX ORDER — AZATHIOPRINE 75 MG/1
75 TABLET ORAL DAILY
Status: SHIPPED
Start: 2024-09-25

## 2024-09-25 RX ADMIN — PANTOPRAZOLE SODIUM 40 MG: 40 TABLET, DELAYED RELEASE ORAL at 07:45

## 2024-09-25 RX ADMIN — DOXYCYCLINE 100 MG: 100 CAPSULE ORAL at 07:57

## 2024-09-25 RX ADMIN — AMOXICILLIN AND CLAVULANATE POTASSIUM 1 TABLET: 875; 125 TABLET, FILM COATED ORAL at 07:46

## 2024-09-25 RX ADMIN — MOMETASONE FUROATE 2 PUFF: 100 AEROSOL RESPIRATORY (INHALATION) at 07:33

## 2024-09-25 RX ADMIN — AZATHIOPRINE 50 MG: 50 TABLET ORAL at 07:46

## 2024-09-25 RX ADMIN — DULOXETINE HYDROCHLORIDE 20 MG: 20 CAPSULE, DELAYED RELEASE ORAL at 07:45

## 2024-09-25 RX ADMIN — AMLODIPINE BESYLATE 10 MG: 5 TABLET ORAL at 07:45

## 2024-09-25 RX ADMIN — FLUTICASONE FUROATE 1 PUFF: 200 POWDER RESPIRATORY (INHALATION) at 07:50

## 2024-09-25 RX ADMIN — ASPIRIN 81 MG CHEWABLE TABLET 81 MG: 81 TABLET CHEWABLE at 07:46

## 2024-09-25 RX ADMIN — ROSUVASTATIN CALCIUM 40 MG: 40 TABLET, FILM COATED ORAL at 07:46

## 2024-09-25 RX ADMIN — LOSARTAN POTASSIUM 75 MG: 50 TABLET, FILM COATED ORAL at 07:45

## 2024-09-25 RX ADMIN — LEVOTHYROXINE SODIUM 75 MCG: 0.03 TABLET ORAL at 07:46

## 2024-09-25 RX ADMIN — MAGNESIUM SULFATE HEPTAHYDRATE 2 G: 40 INJECTION, SOLUTION INTRAVENOUS at 07:34

## 2024-09-25 RX ADMIN — AZITHROMYCIN DIHYDRATE 250 MG: 250 TABLET, FILM COATED ORAL at 07:57

## 2024-09-25 RX ADMIN — CETIRIZINE HYDROCHLORIDE 10 MG: 10 TABLET, FILM COATED ORAL at 07:45

## 2024-09-25 ASSESSMENT — ACTIVITIES OF DAILY LIVING (ADL)
ADLS_ACUITY_SCORE: 22

## 2024-09-25 NOTE — PLAN OF CARE
Problem: Adult Inpatient Plan of Care  Goal: Plan of Care Review  Description: The Plan of Care Review/Shift note should be completed every shift.  The Outcome Evaluation is a brief statement about your assessment that the patient is improving, declining, or no change.  This information will be displayed automatically on your shift  note.  Outcome: Progressing     Problem: Infection  Goal: Absence of Infection Signs and Symptoms  Outcome: Progressing     Problem: Pneumonia  Goal: Fluid Balance  Outcome: Progressing   Goal Outcome Evaluation:       Pt is alert and oriented x 4,independent in the room. Pt is no longer on IV antibiotics. Pt is on K protocol. K was 3.1 at 1415, had two IV potassium based on protocol. Recheck at 0007 was 4.0. next recheck will be at 0600. NSR on tele to sinus tachy with HR in the one teens.

## 2024-09-25 NOTE — PLAN OF CARE
"Goal Outcome Evaluation:                        Problem: Adult Inpatient Plan of Care  Goal: Plan of Care Review  Description: The Plan of Care Review/Shift note should be completed every shift.  The Outcome Evaluation is a brief statement about your assessment that the patient is improving, declining, or no change.  This information will be displayed automatically on your shift  note.  9/25/2024 0802 by Natasha Rose RN  Outcome: Progressing  9/24/2024 1902 by Natasha Rose RN  Outcome: Progressing     Problem: Infection  Goal: Absence of Infection Signs and Symptoms  Outcome: Progressing     Problem: Pneumonia  Goal: Fluid Balance  Outcome: Progressing     Problem: Chronic Kidney Disease  Goal: Electrolyte Balance  Outcome: Progressing     Problem: Electrolyte Imbalance  Goal: Electrolyte Balance  Outcome: Progressing   Mag and K protocol.  K WNL.  Replacing magnesium with recheck for tomorrow.  Patient is hopeful to go home before then.  He reports breathing is \"A LOT BETTER... able to move around without getting short of breath\".  He still has harsh cough.  IV antibiotics switched to PO.  "

## 2024-09-25 NOTE — PLAN OF CARE
Problem: Adult Inpatient Plan of Care  Goal: Plan of Care Review  Description: The Plan of Care Review/Shift note should be completed every shift.  The Outcome Evaluation is a brief statement about your assessment that the patient is improving, declining, or no change.  This information will be displayed automatically on your shift  note.  Outcome: Progressing     Problem: Electrolyte Imbalance  Goal: Electrolyte Balance  Outcome: Progressing     Problem: Adult Inpatient Plan of Care  Goal: Absence of Hospital-Acquired Illness or Injury  Intervention: Identify and Manage Fall Risk  Recent Flowsheet Documentation  Taken 9/24/2024 1600 by Natasha Rose RN  Safety Promotion/Fall Prevention:   assistive device/personal items within reach   clutter free environment maintained   nonskid shoes/slippers when out of bed   patient and family education   room organization consistent   safety round/check completed   supervised activity  Taken 9/24/2024 1200 by Natasha Rose RN  Safety Promotion/Fall Prevention:   assistive device/personal items within reach   clutter free environment maintained   nonskid shoes/slippers when out of bed   patient and family education   room organization consistent   safety round/check completed   supervised activity  Taken 9/24/2024 0752 by Natasha Rose RN  Safety Promotion/Fall Prevention:   assistive device/personal items within reach   clutter free environment maintained   nonskid shoes/slippers when out of bed   patient and family education   room organization consistent   safety round/check completed   supervised activity  Intervention: Prevent Skin Injury  Recent Flowsheet Documentation  Taken 9/24/2024 1600 by Natasha Rose RN  Body Position: position changed independently  Skin Protection: adhesive use limited  Device Skin Pressure Protection: adhesive use limited  Taken 9/24/2024 1200 by Natasha Rose RN  Body Position: position changed independently  Skin  Protection: adhesive use limited  Device Skin Pressure Protection: adhesive use limited  Taken 9/24/2024 0752 by Natasha Rose RN  Body Position: position changed independently  Skin Protection: adhesive use limited  Device Skin Pressure Protection: adhesive use limited  Intervention: Prevent Infection  Recent Flowsheet Documentation  Taken 9/24/2024 1600 by Natasha Rose RN  Infection Prevention:   rest/sleep promoted   hand hygiene promoted   single patient room provided  Taken 9/24/2024 1200 by Natasha Rose RN  Infection Prevention:   rest/sleep promoted   hand hygiene promoted   single patient room provided  Taken 9/24/2024 0752 by Natasha Rose RN  Infection Prevention:   rest/sleep promoted   hand hygiene promoted   single patient room provided     Problem: Diarrhea  Goal: Effective Diarrhea Management  Intervention: Manage Diarrhea  Recent Flowsheet Documentation  Taken 9/24/2024 1600 by Natasha Rose RN  Medication Review/Management: medications reviewed  Taken 9/24/2024 1200 by Natasha Rose RN  Medication Review/Management: medications reviewed  Taken 9/24/2024 0752 by Natasha Rose RN  Medication Review/Management: medications reviewed     Problem: Pneumonia  Goal: Effective Oxygenation and Ventilation  Intervention: Promote Airway Secretion Clearance  Recent Flowsheet Documentation  Taken 9/24/2024 1600 by Natasha Rose RN  Cough And Deep Breathing: done independently per patient  Taken 9/24/2024 1200 by Natasha Rose RN  Cough And Deep Breathing: done independently per patient  Taken 9/24/2024 0752 by Natasha Rose RN  Cough And Deep Breathing: done independently per patient  Intervention: Optimize Oxygenation and Ventilation  Recent Flowsheet Documentation  Taken 9/24/2024 1600 by Natasha Rose RN  Head of Bed (HOB) Positioning: HOB at 20-30 degrees  Taken 9/24/2024 1200 by Natasha Rose RN  Head of Bed (HOB) Positioning: HOB at 20-30  degrees  Taken 9/24/2024 0752 by Natasha Rose RN  Head of Bed ([facility]) Positioning: HOB at 20-30 degrees     Problem: Hypertension Acute  Goal: Blood Pressure Within Desired Range  Intervention: Normalize Blood Pressure  Recent Flowsheet Documentation  Taken 9/24/2024 1600 by Natasha Rose RN  Sensory Stimulation Regulation:   care clustered   lighting decreased  Medication Review/Management: medications reviewed  Taken 9/24/2024 1200 by Natasha Rose RN  Sensory Stimulation Regulation:   care clustered   lighting decreased  Medication Review/Management: medications reviewed  Taken 9/24/2024 0752 by Natasha Rose RN  Sensory Stimulation Regulation:   care clustered   lighting decreased  Medication Review/Management: medications reviewed     Problem: Cardiac Output Decreased  Goal: Effective Cardiac Output  Intervention: Optimize Cardiac Output  Recent Flowsheet Documentation  Taken 9/24/2024 1600 by Natasha Rose RN  Head of Bed (HOB) Positioning: HOB at 20-30 degrees  Taken 9/24/2024 1200 by Natasha Rose RN  Head of Bed ([facility]) Positioning: HOB at 20-30 degrees  Environmental Support: calm environment promoted  Taken 9/24/2024 0752 by Natasha Rose RN  Head of Bed ([facility]) Positioning: HOB at 20-30 degrees  Environmental Support: calm environment promoted     Problem: Pain Acute  Goal: Optimal Pain Control and Function  Intervention: Prevent or Manage Pain  Recent Flowsheet Documentation  Taken 9/24/2024 1600 by Natasha Rose RN  Sensory Stimulation Regulation:   care clustered   lighting decreased  Medication Review/Management: medications reviewed  Taken 9/24/2024 1200 by Natasha Rose RN  Sensory Stimulation Regulation:   care clustered   lighting decreased  Medication Review/Management: medications reviewed  Taken 9/24/2024 0752 by Natasha Rose RN  Sensory Stimulation Regulation:   care clustered   lighting decreased  Medication Review/Management: medications  reviewed  Intervention: Optimize Psychosocial Wellbeing  Recent Flowsheet Documentation  Taken 9/24/2024 1200 by Natasha Rose RN  Supportive Measures: active listening utilized  Taken 9/24/2024 0752 by Natasha Rose RN  Supportive Measures: active listening utilized     Problem: Chronic Kidney Disease  Goal: Optimal Coping with Chronic Illness  Intervention: Support Psychosocial Response  Recent Flowsheet Documentation  Taken 9/24/2024 1200 by Natasha Rose RN  Supportive Measures: active listening utilized  Taken 9/24/2024 0752 by Natasha Rose RN  Supportive Measures: active listening utilized  Goal: Fluid Balance  Intervention: Monitor and Manage Hypervolemia  Recent Flowsheet Documentation  Taken 9/24/2024 1600 by Natasha Rose RN  Skin Protection: adhesive use limited  Taken 9/24/2024 1200 by Natasha Rose RN  Skin Protection: adhesive use limited  Taken 9/24/2024 0752 by Natasha Rose RN  Skin Protection: adhesive use limited  Goal: Optimal Functional Ability  Intervention: Optimize Functional Ability  Recent Flowsheet Documentation  Taken 9/24/2024 1600 by Natasha Rose RN  Self-Care Promotion: independence encouraged  Activity Management:   activity adjusted per tolerance   ambulated in room  Taken 9/24/2024 1200 by Natasha Rose RN  Self-Care Promotion: independence encouraged  Activity Management:   activity adjusted per tolerance   ambulated in room  Environment Familiarity/Consistency: daily routine followed  Taken 9/24/2024 0752 by Natasha Rose RN  Self-Care Promotion: independence encouraged  Activity Management:   activity adjusted per tolerance   ambulated in room  Environment Familiarity/Consistency: daily routine followed  Goal: Absence of Anemia Signs and Symptoms  Intervention: Manage Signs of Anemia and Bleeding  Recent Flowsheet Documentation  Taken 9/24/2024 1200 by Natasha Rose RN  Environmental Support: calm environment  promoted  Taken 9/24/2024 0752 by Natasha Rose RN  Environmental Support: calm environment promoted  Goal: Minimize Renal Failure Effects  Intervention: Monitor and Support Renal Function  Recent Flowsheet Documentation  Taken 9/24/2024 1600 by Natasha Rose, RN  Medication Review/Management: medications reviewed  Taken 9/24/2024 1200 by Natasha Rose RN  Medication Review/Management: medications reviewed  Taken 9/24/2024 0752 by Natasha Rose RN  Medication Review/Management: medications reviewed   Goal Outcome Evaluation:             Magnesium and K replacement. Patient declines oral K replacement due to history of stomach issues with it. He is anxious to get home.  He states he does not get much sleep with IV beeping.  Encourage him to try oral K to decrease IV noise and possibly get better result (K did not increase with last IV replacement).  He might consider if he tries it with full meal (not just a snack).  K recheck scheduled for 2300 tonight.  He reports loose stool x2 today in small amounts.  Cluster cares.  IV antibiotics now switched to PO

## 2024-09-25 NOTE — PLAN OF CARE
Goal Outcome Evaluation:                  Patient discharging to home.  Discussed home instruction using AVS.  Patient aware of follow-up appointments to make. Meds to be picked up at [facility].  Patient is anxious to get home.  PICC line removed without issue.  Patient given a few masks to use when out in public.  Discussed droplet precautions and parvovirus lab result still pending

## 2024-09-25 NOTE — DISCHARGE SUMMARY
"[facility]  Hospitalist Discharge Summary      Date of Admission:  9/20/2024  Date of Discharge:  9/25/2024  Discharging Provider: Magdalena Gomez MD  Discharge Service: Hospitalist Service    Discharge Diagnoses   LLL pneumonia  Sepsis secondary to pneumonia  Chronic immunosuppression   S/p heart transplant   FRANKLIN on CKD   Non anion gap metabolic acidosis   Hypokalemia  Hypomagnesemia  Hyponatremia   Chronic anemia      Clinically Significant Risk Factors     # Overweight: Estimated body mass index is 28.28 kg/m  as calculated from the following:    Height as of this encounter: 1.727 m (5' 8\").    Weight as of this encounter: 84.4 kg (186 lb).       Follow-ups Needed After Discharge   Follow-up Appointments     Follow-up and recommended labs and tests       Follow up with primary care provider, Fredrick Wolff, within 7 days for   hospital follow- up.  The following labs/tests are recommended: CBC and   BMP, follow up for 3 mm RUL lung nodule which is new since 10/28/22.    Follow up with your Transplant team at [facility] in 1 week .            Unresulted Labs Ordered in the Past 30 Days of this Admission       Date and Time Order Name Status Description    9/23/2024 10:38 AM Parvovirus B19 DNA PCR In process     9/20/2024  4:22 PM Blood Culture Peripheral Blood Preliminary         [place] [facility]     [place] Disposition   Discharged to home  Condition at discharge: Stable    Hospital Course   Everton Teixeira is a 60 yo male with complex medical history including heart transplant 2019 due to congenital ASD s/p repair as a child, chronic immunosuppression, CKD stage IIIb, h/o dialysis dependent FRANKLIN, hypertension, chronic anemia, atrial fibrillation and flutter s/p ablation, ulcerative colitis, GIB s/p splenic embolization who presented to [facility] for evaluation of fevers up to 105, shortness of breath, anorexia and fatigue.  Tachycardic but not hypotensive or " hypoxic on presentation.  Laboratory data significant for CRP up to 246 and procalcitonin 1.69.  CT chest without contrast with LLL pneumonia.  Respiratory viral panel, urine Legionella, (1,3)-Beta-D-glucan negative.  Patient completed 5 day course of azithromycin, 4 days of IV Zosyn and vancomycin and was subsequently switched to oral Augmentin and doxycycline to complete additional 10-day treatment.   He has been afebrile for > 48 hours, still has some cough but shortness of breath has significantly improved.  CRP and procalcitonin have been trending down.  Patient has been adamant about leaving today.  He was angry with the writer as he was under impression he would be discharged this morning.  He did not want to stay until seen by ID.      FRANKLIN was noted on presentation with some improvement in renal function.  UA with worsening proteinuria and microhematuria from prior.  Repeat BMP was recommended by PCP in 1 week.  Message sent to Nephrology Dr. Duarte for a follow up ASAP.    Urology was consulted for asymptomatic hematuria.  CT of the pelvis was unremarkable.  MR urogram and outpatient cystoscopy were recommended.      GI was consulted due to concerns for possible melena however ultimately it was thought to be due to Pepto-Bismol and iron use.  Stool Hemoccult was negative.  Test for Cryptosporidium, microsporidia, C. difficile, CMV were negative.       Consultations This Hospital Stay   PHARMACY TO DOSE VANCO  GASTROENTEROLOGY IP CONSULT  UROLOGY IP CONSULT  PHARMACY TO DOSE VANCO  VASCULAR ACCESS ADULT IP CONSULT  INFECTIOUS DISEASES IP CONSULT  INFECTIOUS DISEASES IP CONSULT    Code Status   Full Code    Time Spent on this Encounter   I, Magdalena Gomez MD, personally saw the patient today and spent greater than 30 minutes discharging this patient.       Magdalena Gomez MD  [facility] HEART [address] [address]  Phone: [phone]  Fax:  [phone]  ______________________________________________________________________    Physical Exam   Vital Signs: Temp: 98.5  F (36.9  C) Temp src: Oral BP: 126/72 Pulse: 112   Resp: 24 SpO2: 99 % O2 Device: None (Room air)    Weight: 186 lbs 0 oz  General Appearance: No acute distress  Respiratory: Easy respirations, fine basilar crackles on the left, otherwise clear bilaterally  Cardiovascular: Regular rate and rhythm.  Normal S1-S2, mild tachycardia.  No edema  Other: Awake and alert       Primary Care Physician   Fredrick Wolff    Discharge Orders      Primary Care - Care Coordination Referral      Reason for your hospital stay    Left lower lobe pneumonia     Follow-up and recommended labs and tests     Follow up with primary care provider, Fredrick Wolff, within 7 days for hospital follow- up.  The following labs/tests are recommended: CBC and BMP  Follow up with your Transplant team at [facility] in 1 week .     Activity    Your activity upon discharge: activity as tolerated     Diet    Follow this diet upon discharge: Current Diet:Orders Placed This Encounter      Low Saturated Fat Na <2400 mg       Significant Results and Procedures   Most Recent 3 CBC's:  Recent Labs   Lab Test 09/25/24  0518 09/24/24  0615 09/23/24  0627   WBC 4.7 4.2 3.8*   HGB 10.3* 9.9* 9.9*   MCV 81 80 82    158 132*     Most Recent 3 BMP's:  Recent Labs   Lab Test 09/25/24  0518 09/25/24  0007 09/24/24  1415 09/24/24  0615 09/23/24  1416 09/23/24  0341   *  --   --  131*  --  132*   POTASSIUM 4.2 4.0 3.1* 3.3*   < > 3.3*  3.3*   CHLORIDE 104  --   --  100  --  102   CO2 17*  --   --  15*  --  15*   BUN 20.6  --   --  21.0  --  19.4   CR 2.23*  --   --  2.26*  --  2.16*   ANIONGAP 13  --   --  16*  --  15   RADAMES 8.6*  --   --  8.0*  --  7.3*   GLC 95  --   --  93  --  82    < > = values in this interval not displayed.     7-Day Micro Results       Collected Updated Procedure Result Status      09/24/2024 1415 09/24/2024 1747  MRSA MSSA PCR, Nasal Swab [[id number]]    Swab from Nares, Bilateral    Final result Component Value   MRSA Target DNA Negative   SA Target DNA Negative            09/23/2024 1419 09/23/2024 2058 Respiratory Panel PCR [[id number]]    Swab from Nasopharyngeal    Final result Component Value   Adenovirus Not Detected   Coronavirus Not Detected   This test detects Coronavirus 229E, HKU1, NL63 and OC43 but does not distinguish between them. It does not detect MERS ( Respiratory Syndrome), SARS (Severe Acute Respiratory Syndrome) or 2019-nCoV (Novel 2019) Coronavirus.   Human Metapneumovirus Not Detected   Human Rhin/Enterovirus Not Detected   Influenza A Not Detected   Influenza A, H1 Not Detected   Influenza A 2009 H1N1 Not Detected   Influenza A, H3 Not Detected   Influenza B Not Detected   Parainfluenza Virus 1 Not Detected   Parainfluenza Virus 2 Not Detected   Parainfluenza Virus 3 Not Detected   Parainfluenza Virus 4 Not Detected   Respiratory Syncytial Virus A Not Detected   Respiratory Syncytial Virus B Not Detected   Chlamydia Pneumoniae Not Detected   Mycoplasma Pneumoniae Not Detected            09/22/2024 1735 09/22/2024 2140 Legionella Urinary Antigen and Streptococcus pneumoniae antigen [[id number]]    Urine, Voided    Final result Component Value   Legionella pneumophila serogroup 1 urinary antigen Negative   A negative result does not exclude the possibility of a Legionella infection, as it can be caused by other serogroups and species of Legionella.   Streptococcus pneumoniae antigen Negative   A negative result does not exclude a Streptococcus pneumoniae infection.   Legionella pneumophila Urinary/Strep pneumoniae Antigen Specimen Type Urine            09/22/2024 0836 09/22/2024 1352 Respiratory Aerobic Bacterial Culture with Gram Stain [[id number]]   Sputum from Expectorate    Final result Component Value   Culture >10 Squamous epithelial cells/low power field indicates oral  contamination. Please recollect.   Gram Stain Result >10 Squamous epithelial cells/low power field    <25 PMNs/low power field    4+ Mixed nima               09/21/2024 0059 09/21/2024 0436 Legionella Urinary Antigen and Streptococcus pneumoniae antigen [[id number]]    Urine, Midstream    Final result Component Value   Legionella pneumophila serogroup 1 urinary antigen Negative   A negative result does not exclude the possibility of a Legionella infection, as it can be caused by other serogroups and species of Legionella.   Streptococcus pneumoniae antigen Negative   A negative result does not exclude a Streptococcus pneumoniae infection.   Legionella pneumophila Urinary/Strep pneumoniae Antigen Specimen Type Urine            09/20/2024 2139 09/22/2024 2135 1,3 Beta D glucan fungitell [[id number]]   Peripheral Blood    Final result Component Value Units   (1,3)-Beta-D-Glucan <31 pg/mL   B-D GLUCAN INTERPRETATION (1,3) Negative    INTERPRETIVE INFORMATION: (1,3)-beta-D-glucan (Fungitell)      Less than 31 pg/mL ................... Negative    31-59 pg/mL .......................... Negative    60-79 pg/mL .......................... Indeterminate    Greater than or equal to 80 pg/mL .... Positive    The Fungitell test is indicated for presumptive diagnosis   of fungal infection and should be used in conjunction with   other diagnostic procedures. This test does not detect   certain fungal species such as Cryptococcus, which produce   very low levels of (1,3)-beta-D-glucan. This test will not   detect the zygomycetes, such as Absidia, Mucor, and   Rhizopus, which are not known to produce   (1,3)-beta-D-glucan. In addition, the yeast phase of   Blastomyces dermatitidis produces little   (1,3)-beta-D-glucan and may not be detected by the assay.  Performed By: [facility]  [address] [place], [place] [id number]  : Myles Soliz MD, PhD  CLIA Number: [id number]            09/20/2024  1751 09/23/2024 1528 Microsporidia stool [[id number]]   Stool from Per Rectum    Final result Component Value   Microsporidia Stain Negative     A single negative result does not rule out the possibility   of a microsporidia infection.  Performed By: [facility]  [address] [place] [id number]  : Myles Soliz MD, PhD  CLIA Number: [id number]            09/20/2024 1751 09/20/2024 2216 C. difficile Toxin B PCR with reflex to C. difficile Antigen and Toxins A/B EIA [[id number]]    Stool from Per Rectum    Final result Component Value   C Difficile Toxin B by PCR Negative   A negative result does not exclude actual disease due to C. difficile and may be due to improper collection, handling and storage of the specimen or the number of organisms in the specimen is below the detection limit of the assay.            09/20/2024 1751 09/20/2024 2300 Enteric Bacteria and Virus Panel by STEFANY Stool (Salmonella, Shigella and Campylobacter, Shiga Toxin 1&2, Yersinia enterocolitica, Vibrio, Norovirus and Rotavirus) [[id number]]    Stool from Per Rectum    Final result Component Value   Campylobacter species Negative   Salmonella species Negative   Vibrio species Negative   Vibrio cholerae Negative   Yersinia enterocolitica Negative   Enteropathogenic E. coli (EPEC) Negative   Shiga-like toxin-producing E. coli (STEC) Negative   Shigella/Enteroinvasive E. coli (EIEC) Negative   Cryptosporidium species Negative   Giardia lamblia Negative   Norovirus Gl/Gll Negative   Rotavirus A Negative   Plesiomonas shigelloides Negative   Enteroaggregative E. coli (EAEC) Negative   Enterotoxigenic E. coli (ETEC) Negative   E. coli O157 NA   Cyclospora cayetanensis Negative   Entamoeba histolytica Negative   Adenovirus F40/41 Negative   Astrovirus Negative   Sapovirus Negative            09/20/2024 1751 09/23/2024 1406 Cryptosporidium in stool stain [[id number]]    Stool from Per Rectum    Final  result Component Value   Cryptosporidium oocysts Not Found   Cyclospora cayetanenis Not Found   Cystoisospora fermin Not Found            09/20/2024 1633 09/24/2024 1946 Blood Culture Peripheral Blood [[id number]]   Peripheral Blood    Preliminary result Component Value   Culture No growth after 4 days  [P]                09/20/2024 1601 09/20/2024 1644 Symptomatic Influenza A/B, RSV, & SARS-CoV2 PCR (COVID-19) Nasopharyngeal [[id number]]    Swab from Nasopharyngeal    Final result Component Value   Influenza A PCR Negative   Influenza B PCR Negative   RSV PCR Negative   SARS CoV2 PCR Negative   NEGATIVE: SARS-CoV-2 (COVID-19) RNA not detected, presumed negative.            09/19/2024 1600 09/24/2024 1931 Blood Culture Peripheral Blood [[id number]]   Peripheral Blood    Final result Component Value   Culture No Growth               09/19/2024 1600 09/20/2024 1237 Cytomegalovirus DNA by PCR, Quantitative [[id number]]    Blood from Arm, Left    Final result Component Value Units   CMV DNA IU/mL Not Detected IU/mL            09/19/2024 1600 09/20/2024 1712 Sonya Barr Virus Quantitative PCR, Plasma [[id number]]    Blood from Arm, Left    Final result Component Value Units   EBV DNA IU/mL Not Detected IU/mL                  Most Recent ESR & CRP:  Recent Labs   Lab Test 09/24/24  1006 09/20/24  1600 09/24/19  0435 09/23/19  1534   SED  --   --   --  17   CRP  --   --  <2.9 3.8   CRPI 143.00*   < >  --   --     < > = values in this interval not displayed.   ,   Results for orders placed or performed during the hospital encounter of 09/20/24   XR Chest Port 1 View    Narrative    EXAM: XR CHEST PORT 1 VIEW  LOCATION: [facility]  DATE: 09/20/2024    INDICATION: Short of breath.  COMPARISON: None.      Impression    IMPRESSION: Sternotomy. Heart size within normal limits. Stable soft tissue prominence in the upper mediastinum. Old right rib fracture. Clips in the left axilla. No pulmonary  infiltrates.   Chest CT w/o contrast    Narrative    EXAM: CT CHEST W/O CONTRAST  LOCATION: [facility]  DATE: 9/20/2024    INDICATION: Fever, shortness of breath.   COMPARISON: Chest 10/20/2022.   TECHNIQUE: CT chest without IV contrast. Multiplanar reformats were obtained. Dose reduction techniques were used.  CONTRAST: None.    FINDINGS:     LUNGS AND PLEURA: Consolidative opacities with air bronchograms in the left lower lobe. Scattered punctate calcified granulomas in the right lower lobe. A 3 mm nodule in the right upper lobe (4/#136) is new since the prior exam. A 2 mm left upper lobe   nodule at the apex (4/#45) is unchanged.     MEDIASTINUM/AXILLAE: No enlarged thoracic lymph node. Postoperative changes from heart transplantation with a left brachiocephalic vein stent. Patency of the stent is not assessed without contrast material. No pericardial effusion. Surgical clips within   the left axilla and mediastinum.     CORONARY ARTERY CALCIFICATION: None.    UPPER ABDOMEN: Small left hepatic cyst. Unchanged small benign right adrenal myelolipoma.     MUSCULOSKELETAL: Median sternotomy wires. Unchanged L1 compression deformity. Multilevel degenerative changes of the spine. No acute bony abnormalities.       Impression    IMPRESSION:     1.  Left lower lobe pneumonia.    2.  A 3 mm right upper lobe nodule is new since 10/28/2022. See follow-up guidelines below.     REFERENCE:  Guidelines for Management of Incidental Pulmonary Nodules Detected on CT Images: From the Fleischner Society 2017.   Guidelines apply to incidental nodules in patients who are 35 years or older.  Guidelines do not apply to lung cancer screening, patients with immunosuppression, or patients with known primary cancer.    SINGLE NODULE  Nodule size <6 mm  Low-risk patients: No follow-up needed.  High-risk patients: Optional follow-up at 12 months.   CT Abdomen Pelvis w/o Contrast    Narrative    EXAM: CT ABDOMEN PELVIS  W/O CONTRAST  LOCATION: [facility]  DATE: 9/21/2024    INDICATION: microhematuria, renal failure, cardiac transplant  COMPARISON: None.  TECHNIQUE: CT scan of the abdomen and pelvis was performed without IV contrast. Multiplanar reformats were obtained. Dose reduction techniques were used.  CONTRAST: None.    FINDINGS:   LOWER CHEST: Patchy airspace consolidation noted at the left lung base.    HEPATOBILIARY: Small hepatic cysts. Liver otherwise unremarkable. Gallbladder within normal limits.    PANCREAS: Normal.    SPLEEN: Normal.    ADRENAL GLANDS: Normal.    KIDNEYS/BLADDER: No significant mass, stone, or hydronephrosis.    BOWEL: No obstruction or inflammatory change.    LYMPH NODES: Normal.    VASCULATURE: Moderate atherosclerotic disease of the abdominal aorta and its branches.    PELVIC ORGANS: Bladder within normal limits.  Tiny fat-containing right inguinal hernia.    MUSCULOSKELETAL: Degenerative changes of the spine.      Impression    IMPRESSION:   1.  No acute findings in the abdomen and pelvis. No stones are identified.     IR PICC Placement > 5 Yrs of Age    Narrative    [facility]  LOCATION: [facility]  DATE: 9/21/2024    PROCEDURE: PERIPHERALLY INSERTED CENTRAL CATHETER (PICC) PLACEMENT    INTERVENTIONAL RADIOLOGIST: Dewayne Desai MD.    INDICATION: Inadequate IV access. History of heart transplant and left-sided SVC.    Air Kerma:   65 mGy  FLUOROSCOPIC TIME: 1.3 minutes  Contrast: 3 cc Omnipaque 350     COMPLICATIONS: No immediate complications.    STERILE BARRIER TECHNIQUE: Maximum sterile barrier technique was used. Cutaneous antisepsis was performed at the operative site with application of 2% chlorhexidine and large sterile drape. Prior to the procedure, the surgeon and assistant performed hand   hygiene and wore hat, mask, sterile gown, and sterile gloves during the entire procedure.    PROCEDURE/TECHNIQUE:   Ultrasound revealed a  patent and compressible left basilic vein, and an ultrasound image was obtained and placed in the patient's permanent medical record. The left upper arm was prepped and draped in usual sterile fashion followed by local anesthesia   with 1% Xylocaine. Using real-time ultrasound guidance, the left basilic vein was accessed. Left upper extremity venogram was performed through the peel-away sheath. A 5 Tajik, 36 cm, double-lumen power PICC Solo peripherally inserted central catheter   was placed under fluoroscopic guidance with its tip near the caval atrial junction.    FINDINGS:   Left upper extremity venogram: Patent left axillary and subclavian veins with outflow via left-sided SVC.    Ultrasound shows an anechoic and compressible left basilic vein.      The completion fluoroscopic images show the catheter tip to lie in the left-sided SVC.      Impression    IMPRESSION:    Successful placement of PICC line.     Echocardiogram Complete     Value    LVEF  60-65%    Narrative    [id number]  [id number]  [id number]  [id number]^MARIANGEL^[place] [address]     Name: ANA ROSA BALDWIN  MRN: [id number]  : 1965  Study Date: 2024 09:38 AM  Age: 59 yrs  Gender: Male  Patient Location: [facility]  Reason For Study: Tachycardia  Ordering Physician: JAMES AUGUST  Referring Physician: JAMES AUGUST  Performed By: TE     BSA: 2.0 m2  Height: 68 in  Weight: 191 lb  HR: 117  ______________________________________________________________________________  Procedure  Complete Echo Adult. Definity (NDC #75896-753) given intravenously.  ______________________________________________________________________________  Interpretation Summary     Left ventricular size, wall motion and function are normal. The ejection  fraction is 60-65%.  Mildly decreased right ventricular systolic function  The rhythm was sinus tachycardia.  Right ventricular systolic pressure could not be approximated due  to  inadequate tricuspid regurgitation.  No hemodynamically significant valvular abnormalities on 2D or color flow  imaging.  ______________________________________________________________________________  Left Ventricle  Left ventricular size, wall motion and function are normal. The ejection  fraction is 60-65%. There is normal left ventricular wall thickness. Left  ventricular diastolic function is normal. No regional wall motion  abnormalities noted.     Right Ventricle  The right ventricle is normal size. Mildly decreased right ventricular  systolic function. TAPSE is abnormal, which is consistent with abnormal right  ventricular systolic function.     Atria  The left atrium is moderate to severely dilated. Right atrial size is normal.  There is no color Doppler evidence of an atrial shunt.     Mitral Valve  Mitral valve leaflets appear normal. There is no evidence of mitral stenosis  or clinically significant mitral regurgitation.     Tricuspid Valve  The tricuspid valve is not well visualized, but is grossly normal. Right  ventricular systolic pressure could not be approximated due to inadequate  tricuspid regurgitation. There is trace tricuspid regurgitation.     Aortic Valve  Aortic valve leaflets appear normal. There is no evidence of aortic stenosis  or clinically significant aortic regurgitation.     Pulmonic Valve  The pulmonic valve is not well seen, but is grossly normal. There is trace  pulmonic valvular regurgitation.     Vessels  The aorta root is normal. Normal size ascending aorta. IVC diameter <2.1 cm  collapsing >50% with sniff suggests a normal RA pressure of 3 mmHg.     Pericardium  There is no pericardial effusion.     Rhythm  The rhythm was sinus tachycardia.  ______________________________________________________________________________  MMode/2D Measurements & Calculations     IVSd: 0.89 cm  LVIDd: 4.3 cm  LVIDs: 3.0 cm  LVPWd: 0.94 cm  FS: 30.9 %  LV mass(C)d: 127.7 grams  LV mass(C)dI:  63.7 grams/m2  Ao root diam: 3.0 cm  LA dimension: 4.1 cm  asc Aorta Diam: 3.1 cm  LA/Ao: 1.4  LVOT diam: 2.1 cm  LVOT area: 3.5 cm2  Ao root diam index Ht(cm/m): 1.7  Ao root diam index BSA (cm/m2): 1.5  Asc Ao diam index BSA (cm/m2): 1.5  Asc Ao diam index Ht(cm/m): 1.8  LA Volume (BP): 52.5 ml     LA Volume Indexed (AL/bp): 27.2 ml/m2  RV Base: 3.6 cm  RWT: 0.43  TAPSE: 1.2 cm     Doppler Measurements & Calculations  MV E max ata: 89.6 cm/sec  MV max PG: 3.9 mmHg  MV mean P.0 mmHg  MV V2 VTI: 13.1 cm  MVA(VTI): 2.8 cm2  MV dec slope: 545.0 cm/sec2  MV dec time: 0.16 sec  Ao V2 max: 110.0 cm/sec  Ao max P.0 mmHg  Ao V2 mean: 71.8 cm/sec  Ao mean P.0 mmHg  Ao V2 VTI: 16.9 cm  ALONZO(I,D): 2.2 cm2  ALONZO(V,D): 2.3 cm2  LV V1 max P.2 mmHg  LV V1 max: 73.4 cm/sec  LV V1 VTI: 10.5 cm  SV(LVOT): 36.4 ml  SI(LVOT): 18.1 ml/m2  PA acc time: 0.11 sec  AV Ata Ratio (DI): 0.67     ALONZO Index (cm2/m2): 1.1  E/E' av.1  Lateral E/e': 6.9  Medial E/e': 7.2  RV S Ata: 9.3 cm/sec     ______________________________________________________________________________  Report approved by: Marva Oakes 2024 02:46 PM           *Note: Due to a large number of results and/or encounters for the requested time period, some results have not been displayed. A complete set of results can be found in Results Review.       Discharge Medications   Current Discharge Medication List        START taking these medications    Details   amoxicillin-clavulanate (AUGMENTIN) 875-125 MG tablet Take 1 tablet by mouth every 12 hours.  Qty: 18 tablet, Refills: 0    Associated Diagnoses: Pneumonia of left lower lobe due to infectious organism      doxycycline monohydrate (MONODOX) 100 MG capsule Take 1 capsule (100 mg) by mouth every 12 hours.  Qty: 18 capsule, Refills: 0    Associated Diagnoses: Pneumonia of left lower lobe due to infectious organism           CONTINUE these medications which have NOT CHANGED    Details    acetaminophen (TYLENOL) 325 MG tablet Take 2 tablets (650 mg) by mouth every 4 hours as needed for mild pain    Associated Diagnoses: Heart replaced by transplant      albuterol (PROAIR HFA/PROVENTIL HFA/VENTOLIN HFA) 108 (90 Base) MCG/ACT inhaler Inhale 2 puffs into the lungs every 6 hours as needed for shortness of breath or wheezing  Qty: 18 g, Refills: 5    Comments: Pharmacy may dispense brand covered by insurance (Proair, or proventil or ventolin or generic albuterol inhaler)  Associated Diagnoses: Intermittent asthma without complication, unspecified asthma severity      amLODIPine (NORVASC) 5 MG tablet Take 2 tablets (10 mg) by mouth daily  Qty: 180 tablet, Refills: 11    Associated Diagnoses: Benign essential hypertension      aspirin (ASA) 81 MG chewable tablet Take 1 tablet (81 mg) by mouth daily    Associated Diagnoses: S/P orthotopic heart transplant      azaTHIOprine 75 MG TABS Take 75 mg by mouth daily  Qty: 90 tablet, Refills: 3    Associated Diagnoses: Transplanted heart      calcium carbonate (OS-RADAMES) 500 MG tablet Take 1 tablet by mouth daily.      cetirizine (ZYRTEC) 10 MG tablet Take 10 mg by mouth daily      clindamycin (CLINDAMAX) 1 % external gel Apply twice daily as needed for acne on the spots  Qty: 60 g, Refills: 11    Associated Diagnoses: Acne vulgaris      DULoxetine (CYMBALTA) 20 MG capsule Take 1 capsule (20 mg) by mouth daily  Qty: 90 capsule, Refills: 2    Associated Diagnoses: Dysthymia      fluticasone (FLOVENT HFA) 220 MCG/ACT inhaler INHALE 2 PUFFS INTO THE LUNGS TWICE A DAY  Qty: 12 g, Refills: 3    Comments: Pharmacy may dispense brand if preferred by insurance.  Associated Diagnoses: Intermittent asthma without complication, unspecified asthma severity; Subacute cough      Fluticasone Propionate, Inhal, (FLUTICASONE PROPIONATE DISKUS) 100 MCG/ACT AEPB INHALE 1 PUFF INTO THE LUNGS EVERY 12 HOURS.  Qty: 60 each, Refills: 3    Associated Diagnoses: Moderate persistent asthma  without complication      levothyroxine (SYNTHROID/LEVOTHROID) 75 MCG tablet Take 1 tablet (75 mcg) by mouth daily  Qty: 90 tablet, Refills: 3    Associated Diagnoses: Hypothyroidism due to medication      losartan (COZAAR) 25 MG tablet Take 3 tablets (75 mg) by mouth daily  Qty: 270 tablet, Refills: 3    Associated Diagnoses: Transplanted heart      melatonin 3 MG tablet Take 3 mg by mouth nightly as needed for sleep.      mometasone furoate (ASMANEX HFA) 100 MCG/ACT inhaler INHALE 2 PUFFS INTO THE LUNGS TWICE A DAY  Qty: 13 g, Refills: 3    Associated Diagnoses: Moderate persistent asthma without complication      multivitamin w/minerals (THERA-VIT-M) tablet Take 1 tablet by mouth daily    Associated Diagnoses: Heart replaced by transplant      omeprazole (PRILOSEC) 40 MG DR capsule TAKE 1 CAPSULE BY MOUTH EVERY DAY  Qty: 30 capsule, Refills: 11    Associated Diagnoses: Nausea      RESTASIS 0.05 % ophthalmic emulsion Place 1 drop into both eyes 2 times daily as needed for dry eyes.      rosuvastatin (CRESTOR) 40 MG tablet Take 1 tablet (40 mg) by mouth daily  Qty: 90 tablet, Refills: 3    Associated Diagnoses: Heart replaced by transplant      sildenafil (VIAGRA) 50 MG tablet Take 1 tablet (50 mg) by mouth daily as needed (Erectile dysfunction)  Qty: 10 tablet, Refills: 11    Associated Diagnoses: Erectile dysfunction due to diseases classified elsewhere      sirolimus (GENERIC EQUIVALENT) 1 MG tablet Take 2 tablets (2 mg) by mouth daily  Qty: 180 tablet, Refills: 3    Comments: TXP DT 2/24/2019 (Heart) TXP Dischg DT 4/3/2019 DX Heart replaced by transplant Z94.1 [facility] ([place], [place])  Associated Diagnoses: Transplanted heart      tadalafil (CIALIS) 10 MG tablet Take 1 tablet (10 mg) by mouth daily as needed (Erectile dysfunction)  Qty: 10 tablet, Refills: 3    Associated Diagnoses: Erectile dysfunction, unspecified erectile dysfunction type            Allergies   Allergies   Allergen Reactions    Hydromorphone Other (See Comments)     Significant Delirium    Adhesive Tape Blisters     Tegaderm causes bruises, blisters and extreme irritation.    Lisinopril Other (See Comments)     hypotension    Quinolones Other (See Comments)     Would not rx quinolones until QTc interval improved.     Tobramycin Other (See Comments)     Would not use aminoglycosides as his kidney function is very borderline    Codeine Nausea

## 2024-09-26 ENCOUNTER — PATIENT OUTREACH (OUTPATIENT)
Dept: CARE COORDINATION | Facility: CLINIC | Age: 59
End: 2024-09-26
Payer: COMMERCIAL

## 2024-09-26 ENCOUNTER — TELEPHONE (OUTPATIENT)
Dept: TRANSPLANT | Facility: CLINIC | Age: 59
End: 2024-09-26
Payer: COMMERCIAL

## 2024-09-26 DIAGNOSIS — Z94.1 TRANSPLANTED HEART (H): Primary | ICD-10-CM

## 2024-09-26 NOTE — TELEPHONE ENCOUNTER
Pt contacted following recent admission for pneumonia. Pt states feeling better but still tired/fatigued. Pt to get labs done this week. Orders in. Potentially seeing Dr. Darby next week. Pt encouraged to make an appt with PMD as well.

## 2024-09-26 NOTE — PROGRESS NOTES
Clinic Care Coordination Contact  [facility]/Voicemail    Clinical Data: Care Coordinator Outreach    Outreach Documentation Number of Outreach Attempt   9/26/2024   2:04 PM 1       Left message on patient's voicemail with call back information.    Plan: Care Coordinator will try to reach patient again in 1-2 business days.    TCM Episode created  [facility] x 1

## 2024-09-27 NOTE — PROGRESS NOTES
Clinic Care Coordination Contact  [facility]/Voicemail    Clinical Data: Care Coordinator Outreach    Outreach Documentation Number of Outreach Attempt   9/26/2024   2:04 PM 1   9/27/2024  11:02 AM 2       Left message on patient's voicemail with call back information reminded patient to schedule a follow up appointment with his PCP.    Plan: Care Coordinator will do no further outreaches.  Patient has specialty care coordinators to remain primary.     TCM Episode created  UTC x 2

## 2024-09-30 ENCOUNTER — LAB (OUTPATIENT)
Dept: LAB | Facility: HOSPITAL | Age: 59
End: 2024-09-30
Payer: COMMERCIAL

## 2024-09-30 DIAGNOSIS — Z94.1 TRANSPLANTED HEART (H): ICD-10-CM

## 2024-09-30 LAB
ANION GAP SERPL CALCULATED.3IONS-SCNC: 14 MMOL/L (ref 7–15)
ATRIAL RATE - MUSE: 139 BPM
BASOPHILS # BLD AUTO: 0 10E3/UL (ref 0–0.2)
BASOPHILS NFR BLD AUTO: 1 %
BUN SERPL-MCNC: 35.5 MG/DL (ref 8–23)
CALCIUM SERPL-MCNC: 8.9 MG/DL (ref 8.8–10.4)
CHLORIDE SERPL-SCNC: 103 MMOL/L (ref 98–107)
CREAT SERPL-MCNC: 2.61 MG/DL (ref 0.67–1.17)
DIASTOLIC BLOOD PRESSURE - MUSE: NORMAL MMHG
EGFRCR SERPLBLD CKD-EPI 2021: 27 ML/MIN/1.73M2
EOSINOPHIL # BLD AUTO: 0.1 10E3/UL (ref 0–0.7)
EOSINOPHIL NFR BLD AUTO: 2 %
ERYTHROCYTE [DISTWIDTH] IN BLOOD BY AUTOMATED COUNT: 14.3 % (ref 10–15)
GLUCOSE SERPL-MCNC: 86 MG/DL (ref 70–99)
HCO3 SERPL-SCNC: 19 MMOL/L (ref 22–29)
HCT VFR BLD AUTO: 30.4 % (ref 40–53)
HGB BLD-MCNC: 10.1 G/DL (ref 13.3–17.7)
IMM GRANULOCYTES # BLD: 0 10E3/UL
IMM GRANULOCYTES NFR BLD: 1 %
INTERPRETATION ECG - MUSE: NORMAL
LYMPHOCYTES # BLD AUTO: 1 10E3/UL (ref 0.8–5.3)
LYMPHOCYTES NFR BLD AUTO: 23 %
MCH RBC QN AUTO: 27.8 PG (ref 26.5–33)
MCHC RBC AUTO-ENTMCNC: 33.2 G/DL (ref 31.5–36.5)
MCV RBC AUTO: 84 FL (ref 78–100)
MONOCYTES # BLD AUTO: 0.5 10E3/UL (ref 0–1.3)
MONOCYTES NFR BLD AUTO: 12 %
NEUTROPHILS # BLD AUTO: 2.7 10E3/UL (ref 1.6–8.3)
NEUTROPHILS NFR BLD AUTO: 62 %
NRBC # BLD AUTO: 0 10E3/UL
NRBC BLD AUTO-RTO: 0 /100
P AXIS - MUSE: 47 DEGREES
PLATELET # BLD AUTO: 318 10E3/UL (ref 150–450)
POTASSIUM SERPL-SCNC: 4.5 MMOL/L (ref 3.4–5.3)
PR INTERVAL - MUSE: 134 MS
QRS DURATION - MUSE: 138 MS
QT - MUSE: 290 MS
QTC - MUSE: 441 MS
R AXIS - MUSE: 23 DEGREES
RBC # BLD AUTO: 3.63 10E6/UL (ref 4.4–5.9)
SIROLIMUS BLD-MCNC: 12.7 UG/L (ref 5–15)
SODIUM SERPL-SCNC: 136 MMOL/L (ref 135–145)
SYSTOLIC BLOOD PRESSURE - MUSE: NORMAL MMHG
T AXIS - MUSE: 65 DEGREES
TME LAST DOSE: NORMAL H
TME LAST DOSE: NORMAL H
VENTRICULAR RATE- MUSE: 139 BPM
WBC # BLD AUTO: 4.3 10E3/UL (ref 4–11)

## 2024-09-30 PROCEDURE — 80195 ASSAY OF SIROLIMUS: CPT

## 2024-09-30 PROCEDURE — 80048 BASIC METABOLIC PNL TOTAL CA: CPT

## 2024-09-30 PROCEDURE — 36415 COLL VENOUS BLD VENIPUNCTURE: CPT

## 2024-09-30 PROCEDURE — 85004 AUTOMATED DIFF WBC COUNT: CPT

## 2024-10-01 DIAGNOSIS — Z94.1 TRANSPLANTED HEART (H): ICD-10-CM

## 2024-10-01 RX ORDER — SIROLIMUS 1 MG/1
1 TABLET, FILM COATED ORAL DAILY
Qty: 90 TABLET | Refills: 3 | Status: SHIPPED | OUTPATIENT
Start: 2024-10-01

## 2024-10-01 NOTE — NURSING NOTE
Transplant Coordinator Note    Reason for visit: follow up visit after recent pneumonia diagnosis  Coordinator: Present       Health concerns addressed today:  1. Some shortness of breath.   2. Nausea in am...  3.       Immunosuppressants:  Rapa goal 4-6. Recent level 12.7 dose decreased to 1 mg daily.   Aza 75 mg daily. (Decreased last Feb for frequent sinus infections).       Routine screenings:    Derm:   Dental:  Colonoscopy:  Prostate:  Eye:   Flu/Pneumonia:       Resulted reviewed with patient  Medication record reviewed and reconciled  Questions and concerns addressed  Pt verbalized an understanding of plan of care.     Patient Instructions  1. Follow up with the kidney team when able.   2.  ounces of fluid a day.   3. Continue azathioprine 75 mg daily.   4. Repeat labs in 1 week as planned with rapa level.   5. Wait to have any vaccines for now. Wait until end of oct.       Next transplant clinic appointment:  Next March with cardiac MRI  Next lab draw: 1 week rapa level.   Coordinator will call with all pending results.     Please call transplant coordinator with any questions:    Jocelynn Jerez RN BSN   Post Heart Transplant Nurse Coordinator  [facility]  Questions: [phone]

## 2024-10-02 ENCOUNTER — OFFICE VISIT (OUTPATIENT)
Dept: CARDIOLOGY | Facility: CLINIC | Age: 59
End: 2024-10-02
Attending: PEDIATRICS
Payer: COMMERCIAL

## 2024-10-02 VITALS
DIASTOLIC BLOOD PRESSURE: 76 MMHG | SYSTOLIC BLOOD PRESSURE: 112 MMHG | OXYGEN SATURATION: 98 % | BODY MASS INDEX: 29.04 KG/M2 | WEIGHT: 191 LBS | HEART RATE: 108 BPM

## 2024-10-02 DIAGNOSIS — J18.9 PNEUMONIA OF BOTH LUNGS DUE TO INFECTIOUS ORGANISM, UNSPECIFIED PART OF LUNG: ICD-10-CM

## 2024-10-02 DIAGNOSIS — E78.5 HYPERLIPIDEMIA LDL GOAL <100: ICD-10-CM

## 2024-10-02 DIAGNOSIS — Z94.1 TRANSPLANTED HEART (H): Primary | ICD-10-CM

## 2024-10-02 DIAGNOSIS — I10 BENIGN ESSENTIAL HYPERTENSION: ICD-10-CM

## 2024-10-02 DIAGNOSIS — N18.32 CHRONIC KIDNEY DISEASE, STAGE 3B (H): ICD-10-CM

## 2024-10-02 DIAGNOSIS — D84.9 IMMUNOSUPPRESSION (H): ICD-10-CM

## 2024-10-02 PROCEDURE — 99211 OFF/OP EST MAY X REQ PHY/QHP: CPT | Performed by: STUDENT IN AN ORGANIZED HEALTH CARE EDUCATION/TRAINING PROGRAM

## 2024-10-02 PROCEDURE — G2211 COMPLEX E/M VISIT ADD ON: HCPCS | Performed by: STUDENT IN AN ORGANIZED HEALTH CARE EDUCATION/TRAINING PROGRAM

## 2024-10-02 PROCEDURE — 99215 OFFICE O/P EST HI 40 MIN: CPT | Performed by: STUDENT IN AN ORGANIZED HEALTH CARE EDUCATION/TRAINING PROGRAM

## 2024-10-02 ASSESSMENT — PAIN SCALES - GENERAL: PAINLEVEL: NO PAIN (0)

## 2024-10-02 NOTE — NURSING NOTE
Chief Complaint   Patient presents with    Follow Up     Return CORE, 54 yo male, Systolic HF, EF 16%, labs prior.       Vitals were taken, medications reconciled.     Moise Alejo, Clinic Assistant    10:09 AM

## 2024-10-02 NOTE — LETTER
10/2/2024      RE: Everton Larios  [address] [facility] [id number]       Dear Colleague,    Thank you for the opportunity to participate in the care of your patient, Everton Larios, at the [facility] [place] [facility]. Please see a copy of my visit note below.    Advanced Heart Failure/Transplant Clinic    HPI:  Everton Larios is a 59 year old male with a history of ASD (s/p repair in childhood), AFib/AF (s/p ablation), NICM, diastolic dysfunction, alcohol abuse, Pierce's esophagus, ulcerative colitis, GIB (s/p splenic embolization), and hypothyroidism, now s/p OHT and bypass of persistent left SVC to right atrial appendage 2/24/19 who presents to clinic for ongoing evaluation and management.     His postoperative course was c/b shock due to RV dysfunction requiring IABP support, small pneumoperitoneum (clinically insignificant), chest wall hematoma (former ICD site, s/p evacuation and drain placement 3/31/19), HCAP/klebsiella pneumonia, and FRANKLIN (required short-term HD, now recovered).  His biopsy 3/25/19 showed mild AMR1 with some staining for c4d. Repeat biopsy 3/28 showed improved AMR and less reactive capillary staining, and subsequent biopsies have now shown resolution of AMR and improved capillary staining.  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 Given concerns for MMF colitis, patient was switched to Imuran in March 2022    Today patient reports he is improving after recent admission for pneumonia. His breathing has improved, but he still has a mild cough. He denies any chest pain or pressure, orthopnea, PND, palpitations, syncope/presyncope or LE edema. He denies productive cough, abdominal pain, or N/V/D. Patient has chronic sinus issues. He also denies any problems with his  medications and reports compliance.    ROS:  A complete 12-point ROS was negative except as above.    Past Medical History:   Diagnosis Date     Alcohol abuse      Arthritis     hands, neck     ASD (atrial septal defect)     s/p repair age 3     Asthma      Atrial fibrillation (H)      Pierce's esophagus 10/4/2018     Pierce's esophagus      Cataract      CHF (congestive heart failure) (H)      Chronic rhinitis 10/4/2018     Chronic systolic heart failure (H) 10/4/2018     Clotting disorder (H)      Congenital cardiomyopathy in  (H)      Depression 10/4/2018     Depression      Diastolic dysfunction      DJD (degenerative joint disease)     neck     DJD (degenerative joint disease) - neck 10/4/2018     H/O congenital atrial septal defect (ASD) repair at age 3 10/4/2018     History of anesthesia complications     nausea     History of transfusion      Hypothyroidism      Hypothyroidism due to medication 10/4/2018     ICD (implantable cardioverter-defibrillator) in place      ICD, [employer] ; gen change 2018 10/4/2018     Intermittent asthma without complication 10/4/2018     Nonischemic cardiomyopathy (H) 10/4/2018     On amiodarone therapy 10/4/2018     Pacemaker      Paroxysmal atrial fibrillation (H) 10/4/2018     S/P ablation of atrial fibrillation 10/4/2018     Systolic heart failure (H)      Ulcerative colitis (H)      Ulcerative colitis (H)      Ventricular tachycardia (H) 10/4/2018     Ventricular tachycardia (H)        Past Surgical History:   Procedure Laterality Date     ABLATION OF DYSRHYTHMIC FOCUS      for A fib     AICD, DUAL CHAMBER       ASD REPAIR  1968     BRONCHOSCOPY (RIGID OR FLEXIBLE), DIAGNOSTIC N/A 2019    Procedure: BRONCHOSCOPY, WITH BAL;  Surgeon: Margot Chiang MD;  Location:  GI     CARDIAC DEFIBRILLATOR PLACEMENT      x2--last was 2018     COLONOSCOPY N/A 2020    Procedure: COLONOSCOPY, WITH POLYPECTOMY AND BIOPSY;  Surgeon: Carlos  Ranjeet Hidalgo MD;  Location:  GI     COLONOSCOPY N/A 02/05/2015    Procedure: COLONOSCOPY with biopsy;  Surgeon: Fernie Akers MD;  Location: [facility];  Service:      COLONOSCOPY N/A 12/19/2017    Procedure: COLONOSCOPY with biopsies and polypectomies using snare;  Surgeon: Gael Romero MD;  Location: [facility];  Service:      COLONOSCOPY N/A 3/8/2022    Procedure: COLONOSCOPY, WITH POLYPECTOMY AND BIOPSY;  Surgeon: Paddy Saldivar DO;  Location:  GI     CV CORONARY ANGIOGRAM N/A 02/12/2020    Procedure: CV CORONARY ANGIOGRAM;  Surgeon: Isiah Mcallister MD;  Location:  HEART CARDIAC CATH LAB     CV CORONARY ANGIOGRAM N/A 03/17/2021    Procedure: CV CORONARY ANGIOGRAM;  Surgeon: Santino Mckeon MD;  [facility]: [facility]     CV CORONARY ANGIOGRAM N/A 2/23/2022    Procedure: CV CORONARY ANGIOGRAM;  Surgeon: Yves Rodrigues MD;  Location: [facility] HE[facility] CARDIAC CATH LAB     CV CORONARY ANGIOGRAM N/A 2/23/2023    Procedure: Coronary Angiogram;  Surgeon: Santino Mckeon MD;  Location: [facility] CARDIAC CATH LAB     CV HEART BIOPSY N/A 03/04/2019    Procedure: Heart Biopsy;  Surgeon: Abhijeet Titus MD;  Location: [facility]ARDIAC CATH LAB     CV HEART BIOPSY N/A 03/25/2019    Procedure: Heart Biopsy;  Surgeon: Yves Rodrigues MD;  Location: [facility]ARD[facility] CATH LAB     CV HEART BIOPSY N/A 03/28/2019    Procedure: Heart Cath Heart Biopsy;  Surgeon: Yves Rodrigues MD;  [facility]     CV HEART BIOPSY N/A 04/10/2019    Procedure: CV HEART BIOPSY;  Surgeon: Isiah Mcallister MD;  [facility]     CV HEART BIOPSY N/A 04/24/2019    Procedure: CV HEART BIOPSY;  Surgeon: Isiah Mcallister MD;  [facility]: [facility] CARD[facility] C[facility] LAB     CV HEART BIOPSY N/A 05/08/2019    Procedure: CV HEART BIOPSY;  Surgeon: Isiah Mcallister MD;  [facility]     CV HEART BIOPSY N/A 06/04/2019    Procedure: CV  HEART BIOPSY;  Surgeon: Alton Solomon MD;  Location: [facility] CARD[facility]AC CATH LAB     CV HEART BIOPSY N/A 05/22/2019    Procedure: CV HEART BIOPSY;  Surgeon: Yves Rodrigues MD;  Location: [facility] CARDIAC CATH LAB     CV HEART BIOPSY N/A 07/17/2019    Procedure: CV HEART BIOPSY;  Surgeon: Isiah Mcallister MD;  Location:  HEART CARDIAC CATH LAB     CV HEART BIOPSY N/A 08/13/2019    Procedure: CV HEART BIOPSY;  Surgeon: Jackson Patten MD;  Location:  HEART CARDIAC CATH LAB     CV HEART BIOPSY N/A 10/15/2019    Procedure: CV HEART BIOPSY;  Surgeon: Santino Mckeon MD;  Location:  HEART CARDIAC CATH LAB     CV HEART BIOPSY N/A 02/12/2020    Procedure: CV HEART BIOPSY;  Surgeon: Isiah Mcallister MD;  Location:  HEART CARDIAC CATH LAB     CV HEART BIOPSY N/A 05/05/2020    Procedure: CV HEART BIOPSY;  Surgeon: Yves Rodrigues MD;  Location:  HEART CARDIAC CATH LAB     CV HEART BIOPSY N/A 03/17/2021    Procedure: CV HEART BIOPSY;  Surgeon: Santino Mckeon MD;  Location:  HEART CARDIAC CATH LAB     CV HEART BIOPSY N/A 5/10/2022    Procedure: Heart Biopsy;  Surgeon: Yves Rodrigues MD;  Location:  HEART CARDIAC CATH LAB     CV INTRA AORTIC BALLOON N/A 02/18/2019    Procedure: Intra-Aortic Balloon;  Surgeon: Alton Solomon MD;  Location:  HEART CARDIAC CATH LAB     CV INTRA AORTIC BALLOON N/A 02/20/2019    Procedure: Replace subclavian IABP;  Surgeon: Yves Rodrigues MD;  Location:  HEART CARDIAC CATH LAB     CV INTRAVASULAR ULTRASOUND N/A 02/12/2020    Procedure: CV INTRAVASCULAR ULTRASOUND;  Surgeon: Isiah Mcallister MD;  Location:  HEART CARDIAC CATH LAB     CV LEFT HEART CATH N/A 03/19/2019    Procedure: Left Heart Cath;  Surgeon: Yves Rodrigues MD;  Location:  HEART CARDIAC CATH LAB     CV LEFT HEART CATH N/A 02/12/2020    Procedure: Left Heart Cath;  Surgeon: Isiah Mcallister MD;  Location:  HEART CARDIAC CATH LAB     CV MYOCARDIAL  BIOPSY N/A 03/19/2019    Procedure: RHC/HBx - Femoral access for 3/19 per Nimisha GONZALEZ;  Surgeon: Yves Rodrigues MD;  Location: [facility] CARD[facility] C[facility] LAB     CV MYOCARDIAL BIOPSY N/A 03/11/2019    Procedure: ADD ON RHC/HBX;  Surgeon: Alton Solomon MD;  [facility]     CV RIGHT HEART CATH MEASUREMENTS RECORDED N/A 03/25/2019    Procedure: Right Heart Cath;  Surgeon: Yves Rodrigues MD;  [facility]     CV RIGHT HEART CATH MEASUREMENTS RECORDED N/A 03/28/2019    Procedure: Heart Cath Right Heart Cath;  Surgeon: Yves Rodrigues MD;  [facility]     CV RIGHT HEART CATH MEASUREMENTS RECORDED N/A 04/24/2019    Procedure: CV RIGHT HEART CATH;  Surgeon: Isiah Mcallister MD;  [facility]     CV RIGHT HEART CATH MEASUREMENTS RECORDED N/A 06/04/2019    Procedure: CV RIGHT HEART CATH;  Surgeon: Alton Solomon MD;  [facility]     CV RIGHT HEART CATH MEASUREMENTS RECORDED N/A 05/22/2019    Procedure: CV RIGHT HEART CATH;  Surgeon: Yves Rodrigues MD;  [facility]     CV RIGHT HEART CATH MEASUREMENTS RECORDED N/A 08/13/2019    Procedure: CV RIGHT HEART CATH;  Surgeon: Jackson Patten MD;  [facility]     CV RIGHT HEART CATH MEASUREMENTS RECORDED N/A 10/15/2019    Procedure: CV RIGHT HEART CATH;  Surgeon: Santino Mckeon MD;  Location: [facility]ARD[facility]AC CATH LAB     CV RIGHT HEART CATH MEASUREMENTS RECORDED N/A 02/12/2020    Procedure: CV RIGHT HEART CATH;  Surgeon: Isiah Mcallister MD;  Location: [facility]ARD[facility] C[facility]     CV RIGHT HEART CATH MEASUREMENTS RECORDED N/A 03/17/2021    Procedure: CV RIGHT HEART CATH;  Surgeon: Santino Mckeon MD;  [facility]: [facility]     CV RIGHT HEART CATH MEASUREMENTS RECORDED N/A 2/23/2022    Procedure: CV RIGHT HEART CATH;  Surgeon: Yves Rodrigues MD;   Location:  HEART CARDIAC CATH LAB     CV RIGHT HEART CATH MEASUREMENTS RECORDED N/A 5/10/2022    Procedure: Right Heart Catheterization;  Surgeon: Yves Rodrigeus MD;  Location:  HEART CARDIAC CATH LAB     CV RIGHT HEART CATH MEASUREMENTS RECORDED N/A 2/23/2023    Procedure: Right Heart Catheterization;  Surgeon: Santino Mckeon MD;  Location:  HEART CARDIAC CATH LAB     ESOPHAGOSCOPY, GASTROSCOPY, DUODENOSCOPY (EGD), COMBINED N/A 12/19/2017    Procedure: ESOPHAGOGASTRODUODENOSCOPY (EGD) with biopsies;  Surgeon: Gael Romero MD;  Location: [facility];  Service:      ESOPHAGOSCOPY, GASTROSCOPY, DUODENOSCOPY (EGD), COMBINED N/A 3/8/2022    Procedure: ESOPHAGOGASTRODUODENOSCOPY, WITH BIOPSY;  Surgeon: Paddy Saldivar DO;  Location:  GI     H STATISTIC PICC LINE INSERTION >5YR, FAILED Bilateral 10/28/2021    L sided SVC     HAND SURGERY Left      HC REVISE MEDIAN N/CARPAL TUNNEL SURG  09/21/2016    Procedure: OPEN RIGHT CARPAL TUNNEL RELEASE CORTISONE INJECTION RIGHT THUMB;  Surgeon: Duong Santoyo MD;  Location: [facility] OR;  Service: Orthopedics     INCISION AND DRAINAGE CHEST WASHOUT, COMBINED N/A 03/21/2019    Procedure: Incision And Drainage; Evacuation Right Chest Wall Hematoma;  Surgeon: Dewayne House MD;  [facility]     INSERT INTRAAORTIC BALLOON PUMP Left 02/19/2019    Procedure: Insert left  Subclavian Balloon Pump,  Removal Right femoral arterial balloom pump sheath;  Surgeon: Dewayne House MD;  [facility]     INSERT INTRAAORTIC BALLOON PUMP Left 02/21/2019    Procedure: SUBCLAVIAN BALLOON PUMP PLACEMENT;  Surgeon: Ben White MD;  [facility]     INSERT INTRACORONARY STENT      x2     IR PICC PLACEMENT > 5 YRS OF AGE  05/08/2019     IR PICC PLACEMENT > 5 YRS OF AGE  9/21/2024     TRANSPLANT HEART RECIPIENT N/A 02/24/2019    Procedure: TRANSPLANT HEART RECIPIENT;  Surgeon: Dewayne House MD;  [facility]       Family History    Problem Relation Age of Onset     Skin Cancer Mother      Endometrial Cancer Mother      Osteoporosis Mother      Hypertension Father      Endometrial Cancer Maternal Grandmother      Hip fracture No family hx of      Nephrolithiasis No family hx of        Social History     Tobacco Use     Smoking status: Former     Current packs/day: 0.00     Average packs/day: 1 pack/day for 28.8 years (28.8 ttl pk-yrs)     Types: Cigarettes     Start date: 1/1/1980     Quit date: 11/6/2008     Years since quitting: 15.9     Passive exposure: Past     Smokeless tobacco: Never   Substance Use Topics     Alcohol use: Yes     Alcohol/week: 1.0 - 2.0 standard drink of alcohol     Types: 1 - 2 Cans of beer per week       MEDICATIONS:  Current Outpatient Medications   Medication Sig Dispense Refill     acetaminophen (TYLENOL) 325 MG tablet Take 2 tablets (650 mg) by mouth every 4 hours as needed for mild pain       albuterol (PROAIR HFA/PROVENTIL HFA/VENTOLIN HFA) 108 (90 Base) MCG/ACT inhaler Inhale 2 puffs into the lungs every 6 hours as needed for shortness of breath or wheezing 18 g 5     amLODIPine (NORVASC) 5 MG tablet Take 2 tablets (10 mg) by mouth daily 180 tablet 11     amoxicillin-clavulanate (AUGMENTIN) 875-125 MG tablet Take 1 tablet by mouth every 12 hours. 18 tablet 0     aspirin (ASA) 81 MG chewable tablet Take 1 tablet (81 mg) by mouth daily       azaTHIOprine 75 MG TABS Take 75 mg by mouth daily. PLEASE CONFIRM THE DOSE WITH YOUR TRANSPLANT TEAM       calcium carbonate (OS-RADAMES) 500 MG tablet Take 1 tablet by mouth daily.       cetirizine (ZYRTEC) 10 MG tablet Take 10 mg by mouth daily       clindamycin (CLINDAMAX) 1 % external gel Apply twice daily as needed for acne on the spots 60 g 11     doxycycline monohydrate (MONODOX) 100 MG capsule Take 1 capsule (100 mg) by mouth every 12 hours. 18 capsule 0     DULoxetine (CYMBALTA) 20 MG capsule Take 1 capsule (20 mg) by mouth daily 90 capsule 2     fluticasone (FLOVENT  HFA) 220 MCG/ACT inhaler INHALE 2 PUFFS INTO THE LUNGS TWICE A DAY 12 g 3     Fluticasone Propionate, Inhal, (FLUTICASONE PROPIONATE DISKUS) 100 MCG/ACT AEPB INHALE 1 PUFF INTO THE LUNGS EVERY 12 HOURS. 60 each 3     levothyroxine (SYNTHROID/LEVOTHROID) 75 MCG tablet Take 1 tablet (75 mcg) by mouth daily 90 tablet 3     losartan (COZAAR) 25 MG tablet Take 3 tablets (75 mg) by mouth daily 270 tablet 3     melatonin 3 MG tablet Take 3 mg by mouth nightly as needed for sleep.       mometasone furoate (ASMANEX HFA) 100 MCG/ACT inhaler INHALE 2 PUFFS INTO THE LUNGS TWICE A DAY 13 g 3     multivitamin w/minerals (THERA-VIT-M) tablet Take 1 tablet by mouth daily       omeprazole (PRILOSEC) 40 MG DR capsule TAKE 1 CAPSULE BY MOUTH EVERY DAY 30 capsule 11     RESTASIS 0.05 % ophthalmic emulsion Place 1 drop into both eyes 2 times daily as needed for dry eyes.       rosuvastatin (CRESTOR) 40 MG tablet Take 1 tablet (40 mg) by mouth daily 90 tablet 3     sildenafil (VIAGRA) 50 MG tablet Take 1 tablet (50 mg) by mouth daily as needed (Erectile dysfunction) 10 tablet 11     sirolimus (GENERIC EQUIVALENT) 1 MG tablet Take 1 tablet (1 mg) by mouth daily. 90 tablet 3     tadalafil (CIALIS) 10 MG tablet Take 1 tablet (10 mg) by mouth daily as needed (Erectile dysfunction) 10 tablet 3       EXAM:   /76 (BP Location: Right arm, Patient Position: Chair, Cuff Size: Adult Regular)   Pulse 108   Wt 86.6 kg (191 lb)   SpO2 98%   BMI 29.04 kg/m    General: appears comfortable, alert and interactive, in no acute distress  Head: normocephalic, atraumatic, mild swelling along right cheek and bottom lip  Eyes: anicteric sclera, EOMI  Mouth: teeth intact, no tenderness or sores in the mouth  Neck: supple, no cervical adenopathy  CV: regular rate and rhythm, S1/S2, no murmur, gallop, rub, estimated JVP ~7 cm  Resp: clear, no rales or wheezing  GI: soft, nontender, nondistended, +BS  Extremities: warm, no peripheral edema, 2+ bilateral  radial pulses  Neurological: normal speech and affect, no gross motor deficits  Psych: normal mood and affect  Derm: no rashes or lesions on exposed surfaces    I personally reviewed recent labs and data as below and discussed the results with the patient in clinic today.  Labs:  CBC RESULTS:  Lab Results   Component Value Date    WBC 4.3 09/30/2024    WBC 5.0 04/09/2021    RBC 3.63 (L) 09/30/2024    RBC 4.61 04/09/2021    HGB 10.1 (L) 09/30/2024    HGB 11.2 (A) 04/09/2021    HCT 30.4 (L) 09/30/2024    HCT 34.6 04/09/2021    MCV 84 09/30/2024    MCV 75 04/09/2021    MCH 27.8 09/30/2024    MCH 24.3 04/09/2021    MCHC 33.2 09/30/2024    MCHC 32.4 04/09/2021    RDW 14.3 09/30/2024    RDW 14.9 04/09/2021     09/30/2024     04/09/2021       CMP RESULTS:  Lab Results   Component Value Date     09/30/2024     03/17/2021    POTASSIUM 4.5 09/30/2024    POTASSIUM 4.4 05/28/2022    POTASSIUM 3.9 03/17/2021    CHLORIDE 103 09/30/2024    CHLORIDE 107 05/28/2022    CHLORIDE 106 03/17/2021    CO2 19 (L) 09/30/2024    CO2 24 05/28/2022    CO2 24 03/17/2021    ANIONGAP 14 09/30/2024    ANIONGAP 6 05/28/2022    ANIONGAP 8 03/17/2021    GLC 86 09/30/2024    GLC 97 05/28/2022    GLC 85 03/17/2021    BUN 35.5 (H) 09/30/2024    BUN 31 (H) 05/28/2022    BUN 32 (H) 03/17/2021    CR 2.61 (H) 09/30/2024    CR 1.80 (H) 03/17/2021    GFRESTIMATED 27 (L) 09/30/2024    GFRESTIMATED 34 (L) 06/22/2021    GFRESTIMATED 41 (L) 03/17/2021    GFRESTBLACK 41 (L) 06/22/2021    GFRESTBLACK 47 (L) 03/17/2021    RADAMES 8.9 09/30/2024    RADAMES 9.0 03/17/2021    BILITOTAL 0.4 09/23/2024    BILITOTAL 0.5 03/17/2021    ALBUMIN 3.1 (L) 09/23/2024    ALBUMIN 3.2 (L) 02/23/2022    ALBUMIN 3.6 03/17/2021    ALKPHOS 123 09/23/2024    ALKPHOS 131 03/17/2021    ALT 43 09/23/2024    ALT 49 03/17/2021    AST 67 (H) 09/23/2024    AST 48 (H) 03/17/2021        INR RESULTS:  Lab Results   Component Value Date    INR 1.17 (H) 09/23/2019       Lab Results    Component Value Date    MAG 1.9 09/25/2024    MAG 1.6 03/17/2021     Lab Results   Component Value Date    NTBNPI 1,445 (H) 09/20/2024    NTBNPI 767 09/23/2019     Lab Results   Component Value Date    NTBNP 10,986 (H) 11/15/2018     TTE 9/21/24  Interpretation Summary  Left ventricular size, wall motion and function are normal. The ejection  fraction is 60-65%.  Mildly decreased right ventricular systolic function  The rhythm was sinus tachycardia.  Right ventricular systolic pressure could not be approximated due to  inadequate tricuspid regurgitation.  No hemodynamically significant valvular abnormalities on 2D or color flow imaging.    cMRI 4/18/24  SUMMARY   ==========================================================================================================     Clinical history: 59-year old male with a history of orthotopic heart transplantation in 2019. His last  coronary angiogram in 02/2023 was devoid of coronary allograft vasculopathy. CMR for graft surveillance.  Comparison CMR: 02/23/2022     1. The LV is normal in cavity size and wall thickness. The global systolic function is normal. The LVEF is  54%. There are no regional wall motion abnormalities.     2. The RV is normal in cavity size. The global systolic function is normal. The RVEF is 54%.      3. The left atrium is mildly enlarged due to transplantation. The right atrium is normal in size.      4. There is no significant valvular disease.      5. Late gadolinium enhancement imaging shows a small burden of patchy, midmyocardial fibrosis in the basal  inferoseptal segment. This is of unclear significance.     6. Regadenoson stress perfusion imaging shows no ischemia.     7.  There is no pericardial effusion or thickening.     8. There is no intracardiac thrombus.     CONCLUSIONS: Orthotopic heart transplant. Normal biventricular graft function without ischemia. there is a  small burden of fibrosis of unclear significance. Upon direct visual  comparison to the prior examinations  dated 03/17/2021 and 02/23/2022, there are no significant changes in the graft function or the degree of  fibrosis.     TTE 2/23/23  Interpretation Summary  Left ventricular size, wall motion and function are normal. The ejection  fraction is 55-60%.  On direct comparison, the global right ventricular function appears mildly  reduced compared to 2022, but TAPSE and S' values are similar.  No significant valve abnormalities.  The inferior vena cava is normal.  No pericardial effusion.    Coronary Angiogram/RHC 2/23/23  Coronary Findings    Diagnostic  Dominance: Right  Left Main   The vessel is large. There was 0% vessel disease.      Left Anterior Descending   The vessel is large. There was 0% vessel disease.      Left Circumflex   The vessel is moderate in size. There was 0% vessel disease.      First Obtuse Marginal Branch   The vessel is large.      Right Coronary Artery   The vessel is large. There was 0% vessel disease.         Intervention     No interventions have been documented.     Hemodynamics    Right Heart Catheterization:  /89/114 mmHg  HR 92 BPM    RA 7/11/7 mmHg  RV 40/7 mmHg  PA 40/19/28 mmHg  PCW 17/24/17 mmHg  Jaden CO 7.13 L/min Normal = 4.0-8.0 L/min  Jaden CI 3.64 L/min/m2 Normal = 2.5-4.0 L/min/m2  TD CO 6.93 L/min Normal = 4.0-8.0 L/min  TD CI 3.54 L/min/m2 Normal = 2.5-4.0 L/min/m2  PA sat 72.4%   Hgb 10.7 g/dL   PVR 1.71 Woods units  SVR 1021 dynes-sec/cm5     ECG 2/23/23 shows sinus rhythm with nonspecific intraventricular conduction block, no acute ST-T changes    RHC 2/23/22  RA: 10  RV: 31 (11)  PA: 30/14 (21)  PVR: 0.91  Jaden CO: 6.3    CI: 3.3  TDCO: 7.4       CI: 3.8     Angiogram 2/23/22  Left Main   The vessel is large. There was 0% vessel disease.   Left Anterior Descending   The vessel is large. There was 0% vessel disease.   Left Circumflex   The vessel is moderate in size. There was 0% vessel disease.   Right Coronary Artery   The  vessel is large. There was 0% vessel disease.     CMR 2/23/22  Clinical history: 56-year-old man post orthotopic heart transplantation in 2019  Comparison CMR: March 2021  1. The left ventricle is normal in cavity size and wall thickness. There are no regional wall motion  abnormalities. The global systolic function is normal. The LVEF is 55%.  2. The right ventricle is normal in cavity size. The global systolic function is normal. The RVEF is 54%.   3. The left atrium is enlarged due to transplantation and the right atrium is normal in size.  4. There is no significant valvular disease.   5. There is patchy midmyocardial late gadolinium enhancement at the basal-mid RV insertion sites consistent with non ischemic fibrosis.    6. There is no ischemia on stress perfusion imaging.  7. There is no pericardial effusion.  8. There is no intracardiac thrombus.  CONCLUSIONS:   Post heart transplantation.  Normal biventricular size and function, LVEF 55 % and RVEF 54%.   No evidence of myocardial ischemia.  No significant changes when compared to prior study from 2021    Echo 8/31/21  Interpretation Summary  Global and regional left ventricular function is normal with an EF of 60-65%.  Global right ventricular function is normal.  Pulmonary artery systolic pressure is normal.  The inferior vena cava was normal in size with preserved respiratory variability.  No pericardial effusion is present.    RHC and angiography 2/12/2020  Pressures Phase   Time  Systolic  Diastolic  Mean  A Wave  V Wave  EDP  Max dp/dt  HR    RA Pressures  10:37 AM    7 mmHg     9 mmHg     7 mmHg       95 bpm       RV Pressures  10:37 AM  30 mmHg         9 mmHg      95 bpm       PA Pressures  10:38 AM  30 mmHg     16 mmHg     20 mmHg         94 bpm       PCW Pressures  10:38 AM    12 mmHg     13 mmHg     13 mmHg       95 bpm       AO Pressures  11:00 AM  94 mmHg     72 mmHg     83 mmHg         98 bpm       Art Pressures  10:59 AM  120 mmHg     74 mmHg      91 mmHg         103 bpm       LV Pressures  10:59 AM  120 mmHg         17 mmHg      102 bpm          Time  Hb  SAT(%)  PO2  Content  PA Sat    PA  10:21 AM   71.5 %       71.5 %       Art  10:21 AM   100 %      14.28 mL/dL        Cardiac Output    Time  TDCO  TDCI  Jaden C.O.  Jaden C.I.  Jaden HR    Cardiac Output Results  10:21 AM  6.77 L/min     3.74 L/min/m2     5.68 L/min     3.13 L/min/m2            Left Main    The vessel was visualized by selective angiography and is large. There was 0% vessel disease.    Left Anterior Descending    The vessel was visualized by selective angiography and is large. There was 0% vessel disease. IVUS was performed on the LMCA and LAD vessels. The LMCA was engaged with a 6 Fr [employer] LF 3.5 GC and the patient was anticoagulated with IV UFH. A BMW was passed into the distal LAD without difficulty. The [facility] IVUS was passed over the wire and manually pulled back while recording. Proximal LAD HERACLIO 0.3 Lumen 24.1 Vessel 29.8 19% area stenosis Mid LAD HERACLIO 0.3 Lumen 10.8 Vessel 13.7 21% area stenosis IVUS was performed on the vessel. Ultrasound supply: [facility] [employer] IVUS SHRT.    Left Circumflex    The vessel was visualized by selective angiography and is moderate in size. There was 0% vessel disease.    Right Coronary Artery    The vessel was visualized by selective angiography and is large. There was 0% vessel disease.    Conclusion  Mild intimal hyperplasia with up to 0.3 mm HERACLIO and 20% narrowing by area.    ECHO 12/18/2019  Interpretation Summary  Left ventricular function, chamber size, wall motion, and wall thickness are  normal.The EF is 60-65%.  Right ventricular function, chamber size, wall motion, and thickness are  normal.  No significant valvular abnormalities were noted.  Pulmonary artery systolic pressure is normal.  The inferior vena cava was normal in size with preserved respiratory  variability.  No pericardial effusion is present.      RHC and EMB  10/15/19:  Conclusion     Right sided filling pressures are normal.  Left sided filling pressures are normal.  Normal PA pressures.  Normal cardiac output level.  Successful collection of endomyocardial biopsies          Plan       Follow bedrest per protocol    Continued medical management and lifestyle modifications for cardiovascular risk factor optimizations.    Discharge today per protocol   Hemodynamics     Right Heart Pressures     Right sided filling pressures are normal.Left sided filling pressures are normal. Normal PA pressures.Normal cardiac output level.   Heart Biospy     Heart Biopsy     After informed consent patient was prepped and draped in the usual fashion. Under fluoroscopy guidance a 7 Fr angeled sheath was placed into the right internal jugular vein after local anesthesia with 1.0% lidocaine. 5.5 [place] Jawz bioptome was passed through the sheath to the right ventricular septum and 5 biopsies were obtained. The biopsy specimens were sent to Pathology for examination. The sheath was removed and hemostasis was obtained. The patient tolerated the procedure well and there were no complications.   Pressures Phase: Baseline      Time Systolic Diastolic Mean A Wave V Wave EDP Max dp/dt HR   RA Pressures 11:49 AM   2 mmHg    3 mmHg    4 mmHg      105 bpm      RV Pressures 11:35 AM       1776 mmHg/sec        11:49 AM 22 mmHg        4 mmHg     105 bpm      PA Pressures 11:50 AM 20 mmHg    10 mmHg    14 mmHg        105 bpm      PCW Pressures 11:49 AM   6 mmHg    7 mmHg    7 mmHg      105 bpm      Blood Flow Results Phase: Baseline      Time Results Indexed Values   QP 11:35 AM 5.92 L/min    3.66 L/min/m2      QS 11:35 AM 5.92 L/min    3.66 L/min/m2      Blood Oximetry Phase: Baseline      Time Hb SAT(%) PO2 Content PA Sat   PA 11:35 AM  66.6 %      66.6 %      Art 11:35 AM  100 %     10.74 mL/dL       Cardiac Output Phase: Baseline      Time TDCO TDCI Jaden C.O. Jaden C.I. Jaden HR   Cardiac Output Results  11:35 AM 6.03 L/min    3.74 L/min/m2    5.92 L/min    3.66 L/min/m2        11:51 AM 6.03 L/min          Resistance Results Phase: Baseline      Time PVR SVR PVR-I SVR-I TPR TVR TPR-I TVR-I PVR/SVR TPR/TVR   Resistance Results (Metric) 11:35 .64 dsc-5     172.25 dsc-5/m2     186.62 dsc-5     301.45 dsc-5/m2         Resistance Results (Wood) 11:35 AM 1.33 MARTIN     2.15 MARTIN/m2     2.33 MARTIN     3.77 MARTIN/m2         Stoke Volume Results Phase: Baseline      Time RVSW LVSW RVSW-I LVSW-I   Stroke Work Results 11:35 AM 9.33 gm*m     5.77 gm*m/m2         TTE 10/15/19:  Interpretation Summary  Left ventricular function, chamber size, wall motion, and wall thickness are  normal.The EF is 60-65%. No regional wall motion abnormalities are seen.  Right ventricular function, chamber size, wall motion, and thickness are  normal.  Trace tricuspid insufficiency is present. Pulmonary artery systolic pressure  is normal. The inferior vena cava was normal in size with preserved  respiratory variability. No pericardial effusion is present.  There has been no change.    Assessment and Plan:    59 year old male with a history of ASD (s/p repair in childhood), AFib/AF (s/p ablation), NICM, diastolic dysfunction, alcohol abuse, Pierce's esophagus, ulcerative colitis, GIB (s/p splenic embolization), and hypothyroidism, now s/p OHT and bypass of persistent left SVC to right atrial appendage 2/24/19 who presents for ongoing evaluation and management.    NICM s/p OHT and bypass of persistent left SVC to right atrial appendage 2/24/19  His postoperative course was c/b shock due to RV dysfunction requiring IABP support, small pneumoperitoneum (clinically insignificant), chest wall hematoma (former ICD site, s/p evacuation and drain placement 3/31/19), HCAP/klebsiella pneumonia, and FRANKLIN (required short-term HD, now recovered). His biopsy 3/25/19 showed mild AMR1 with some staining for c4d.  Repeat biopsy 3/28/19 showed improved AMR and less  reactive capillary staining, and subsequent biopsies have now shown resolution of AMR and improved capillary staining.     Today patient is euvolemic by history and exam. Overall doing well. No CAV and grossly normal filling pressures with normal cardiac index/output on last surveillance testing.     Serostatus:  - CMV D+/R+  - EBV D+/R+  - Toxo D-/R-     Immunosuppression:  - Sirolimus 4 mg daily, goal level 4-6, level pending from today  - Azathioprine 75 mg daily, recently decreased during infection, but will resume 75 mg dose now     Graft function:  - BPs: Controlled at home, continue losartan 75 mg daily. Instructed patient to call clinic if BP consistently > 140/90  - HRs:  Stable  - fluid status:  Euvolemic, off diuretics      PPx:  - CAV: Aspirin 81mg daily and rosuvastatin 40 mg daily  - GERD:  Omeprazole (note h/o carrera's esophagus)  - Osteoporosis:  Calcium/vitamin D supplements     Routine screenings  Derm: UTD  Dental: UTD  Colonoscopy: completed 3/8/2022, every 3 years  EGD: 3/8/2022, every 3 years  Prostate:  stable  Eye: UTD  Flu/Pneumonia: DUE  Covid: UTD     Ulcerative Colitis  - Followed by GI    Chronic Kidney Disease Stage III  - Follows with Renal    Chronic Sinus Infections  - Follows with ENT  - Discussed that if any surgeries were to be planned in the future, would need to switch sirolimus back to tac for wound healing    Chronic Right Shoulder Pain  - Follows with Ortho    Optimal Vascular Metrics    Blood Pressure   BP < 140/90 Yes    On Aspirin  Yes    On Statin  Yes    Tobacco use  No     To Do:   - Resume AZA 75 mg daily  - Follow up with Nephrology  - Drink  oz fluids daily  - Repeat labs in one week  - Follow up in March '25 with cMRI and annual testing    A total of 48 minutes was spent on the day of the visit, which includes preparation for the visit (reviewing previous medical records, laboratories and investigations), in conjunction with the actual clinic visit with the  patient, which includes obtaining a history and physical exam, creating and reviewing the care plan, patient education (and family if present), counseling, documenting clinical information in the electronic health record and care coordination.     The longitudinal plan of care for the diagnosis(es)/condition(s) as documented were addressed during this visit. Due to the added complexity in care, I will continue to support [place] in the subsequent management and with ongoing continuity of care.     Slime Lindsay MD   of Medicine, [facility]  Advanced Heart Failure and Transplant Cardiology       Fredrick Wolff        Please do not hesitate to contact me if you have any questions/concerns.     Sincerely,     Slime Lindsay MD

## 2024-10-02 NOTE — PATIENT INSTRUCTIONS
Patient Instructions  1. Follow up with the kidney team when able.   2.  ounces of fluid a day.   3. Continue azathioprine 75 mg daily.   4. Repeat labs in 1 week as planned with rapa level.   5. Wait to have any vaccines for now. Wait until end of oct.       Next transplant clinic appointment:  Next March with cardiac MRI  Next lab draw: 1 week rapa level.   Coordinator will call with all pending results.     Please call transplant coordinator with any questions:    Jocelynn Jerez RN BSN   Post Heart Transplant Nurse Coordinator  [facility]  Questions: [phone]

## 2024-10-02 NOTE — PROGRESS NOTES
Advanced Heart Failure/Transplant Clinic    HPI:  Everton Larios is a 59 year old male with a history of ASD (s/p repair in childhood), AFib/AF (s/p ablation), NICM, diastolic dysfunction, alcohol abuse, Pierce's esophagus, ulcerative colitis, GIB (s/p splenic embolization), and hypothyroidism, now s/p OHT and bypass of persistent left SVC to right atrial appendage 2/24/19 who presents to clinic for ongoing evaluation and management.     His postoperative course was c/b shock due to RV dysfunction requiring IABP support, small pneumoperitoneum (clinically insignificant), chest wall hematoma (former ICD site, s/p evacuation and drain placement 3/31/19), HCAP/klebsiella pneumonia, and FRANKLIN (required short-term HD, now recovered).  His biopsy 3/25/19 showed mild AMR1 with some staining for c4d. Repeat biopsy 3/28 showed improved AMR and less reactive capillary staining, and subsequent biopsies have now shown resolution of AMR and improved capillary staining.  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 Given concerns for MMF colitis, patient was switched to Imuran in March 2022    Today patient reports he is improving after recent admission for pneumonia. His breathing has improved, but he still has a mild cough. He denies any chest pain or pressure, orthopnea, PND, palpitations, syncope/presyncope or LE edema. He denies productive cough, abdominal pain, or N/V/D. Patient has chronic sinus issues. He also denies any problems with his medications and reports compliance.    ROS:  A complete 12-point ROS was negative except as above.    Past Medical History:   Diagnosis Date    Alcohol abuse     Arthritis     hands, neck    ASD (atrial septal defect)     s/p repair age 3    Asthma     Atrial fibrillation (H)     Pierce's esophagus 10/4/2018    Pierce's esophagus     Cataract      CHF (congestive heart failure) (H)     Chronic rhinitis 10/4/2018    Chronic systolic heart failure (H) 10/4/2018    Clotting disorder (H)     Congenital cardiomyopathy in  (H)     Depression 10/4/2018    Depression     Diastolic dysfunction     DJD (degenerative joint disease)     neck    DJD (degenerative joint disease) - neck 10/4/2018    H/O congenital atrial septal defect (ASD) repair at age 3 10/4/2018    History of anesthesia complications     nausea    History of transfusion     Hypothyroidism     Hypothyroidism due to medication 10/4/2018    ICD (implantable cardioverter-defibrillator) in place     ICD, [employer] ; gen change 2018 10/4/2018    Intermittent asthma without complication 10/4/2018    Nonischemic cardiomyopathy (H) 10/4/2018    On amiodarone therapy 10/4/2018    Pacemaker     Paroxysmal atrial fibrillation (H) 10/4/2018    S/P ablation of atrial fibrillation 10/4/2018    Systolic heart failure (H)     Ulcerative colitis (H)     Ulcerative colitis (H)     Ventricular tachycardia (H) 10/4/2018    Ventricular tachycardia (H)        Past Surgical History:   Procedure Laterality Date    ABLATION OF DYSRHYTHMIC FOCUS      for A fib    AICD, DUAL CHAMBER      ASD REPAIR  1968    BRONCHOSCOPY (RIGID OR FLEXIBLE), DIAGNOSTIC N/A 2019    Procedure: BRONCHOSCOPY, WITH BAL;  Surgeon: Margot Chiang MD;  Location:  GI    CARDIAC DEFIBRILLATOR PLACEMENT      x2--last was 2018    COLONOSCOPY N/A 2020    Procedure: COLONOSCOPY, WITH POLYPECTOMY AND BIOPSY;  Surgeon: Ranjeet Cooper MD;  Location:  GI    COLONOSCOPY N/A 2015    Procedure: COLONOSCOPY with biopsy;  Surgeon: Fernie Akers MD;  Location: [facility];  Service:     COLONOSCOPY N/A 2017    Procedure: COLONOSCOPY with biopsies and polypectomies using snare;  Surgeon: Gael Romero MD;  Location: [facility];  Service:     COLONOSCOPY N/A 3/8/2022    Procedure:  COLONOSCOPY, WITH POLYPECTOMY AND BIOPSY;  Surgeon: Paddy Saldivar DO;  Location: [facility]    CV CORONARY ANGIOGRAM N/A 02/12/2020    Procedure: CV CORONARY ANGIOGRAM;  Surgeon: Isiah Mcallister MD;  Location: [facility]    CV CORONARY ANGIOGRAM N/A 03/17/2021    Procedure: CV CORONARY ANGIOGRAM;  Surgeon: Santino Mckeon MD;  [facility]    CV CORONARY ANGIOGRAM N/A 2/23/2022    Procedure: CV CORONARY ANGIOGRAM;  Surgeon: Yves Rodrigues MD;  [facility]    CV CORONARY ANGIOGRAM N/A 2/23/2023    Procedure: Coronary Angiogram;  Surgeon: Santino Mckeon MD;  [facility]    CV HEART BIOPSY N/A 03/04/2019    Procedure: Heart Biopsy;  Surgeon: Abhijeet Titus MD;  [facility]    CV HEART BIOPSY N/A 03/25/2019    Procedure: Heart Biopsy;  Surgeon: Yves Rodrigues MD;  [facility]    CV HEART BIOPSY N/A 03/28/2019    Procedure: Heart Cath Heart Biopsy;  Surgeon: Yves Rodrigues MD;  [facility]    CV HEART BIOPSY N/A 04/10/2019    Procedure: CV HEART BIOPSY;  Surgeon: Isiah Mcallister MD;  [facility]    CV HEART BIOPSY N/A 04/24/2019    Procedure: CV HEART BIOPSY;  Surgeon: Isiah Mcallister MD;  [facility]    CV HEART BIOPSY N/A 05/08/2019    Procedure: CV HEART BIOPSY;  Surgeon: Isiah Mcallister MD;  [facility]    CV HEART BIOPSY N/A 06/04/2019    Procedure: CV HEART BIOPSY;  Surgeon: Alton Solomon MD;  [facility]    CV HEART BIOPSY N/A 05/22/2019    Procedure: CV HEART BIOPSY;  Surgeon: Yves Rodrigues MD;  [facility]    CV HEART BIOPSY N/A 07/17/2019    Procedure: CV HEART BIOPSY;  Surgeon: Isiah Mcallister MD;  [facility]    CV HEART BIOPSY N/A 08/13/2019    Procedure:  CV HEART BIOPSY;  Surgeon: Jackson Patten MD;  Location:  HEART CARDIAC CATH LAB    CV HEART BIOPSY N/A 10/15/2019    Procedure: CV HEART BIOPSY;  Surgeon: Santino Mckeon MD;  [facility]    CV HEART BIOPSY N/A 02/12/2020    Procedure: CV HEART BIOPSY;  Surgeon: Isiah Mcallister MD;  Location:  HEART CARDIAC CATH LAB    CV HEART BIOPSY N/A 05/05/2020    Procedure: CV HEART BIOPSY;  Surgeon: Yves Rodrigues MD;  Location:  HEART CARDIAC CATH LAB    CV HEART BIOPSY N/A 03/17/2021    Procedure: CV HEART BIOPSY;  Surgeon: Santino Mckeon MD;  Location:  HEART CARDIAC CATH LAB    CV HEART BIOPSY N/A 5/10/2022    Procedure: Heart Biopsy;  Surgeon: Yves Rodrigues MD;  Location:  HEART CARDIAC CATH LAB    CV INTRA AORTIC BALLOON N/A 02/18/2019    Procedure: Intra-Aortic Balloon;  Surgeon: Alton Solomon MD;  Location:  HEART CARDIAC CATH LAB    CV INTRA AORTIC BALLOON N/A 02/20/2019    Procedure: Replace subclavian IABP;  Surgeon: Yves Rodrigues MD;  Location:  HEART CARDIAC CATH LAB    CV INTRAVASULAR ULTRASOUND N/A 02/12/2020    Procedure: CV INTRAVASCULAR ULTRASOUND;  Surgeon: Isiah Mcallister MD;  Location:  HEART CARDIAC CATH LAB    CV LEFT HEART CATH N/A 03/19/2019    Procedure: Left Heart Cath;  Surgeon: Yves Rodrigues MD;  Location:  HEART CARDIAC CATH LAB    CV LEFT HEART CATH N/A 02/12/2020    Procedure: Left Heart Cath;  Surgeon: Isiah Mcallister MD;  Location:  HEART CARDIAC CATH LAB    CV MYOCARDIAL BIOPSY N/A 03/19/2019    Procedure: RHC/HBx - Femoral access for 3/19 per Nimisha GONZALEZ;  Surgeon: Yves Rodrigues MD;  Location:  HEART CARDIAC CATH LAB    CV MYOCARDIAL BIOPSY N/A 03/11/2019    Procedure: ADD ON RHC/HBX;  Surgeon: Alton Solomon MD;  Location:  HEART CARDIAC CATH LAB    CV RIGHT HEART CATH MEASUREMENTS RECORDED N/A 03/25/2019    Procedure: Right Heart Cath;  Surgeon: Yves Rodrigues,  MD;  Location:  HEART CARDIAC CATH LAB    CV RIGHT HEART CATH MEASUREMENTS RECORDED N/A 03/28/2019    Procedure: Heart Cath Right Heart Cath;  Surgeon: Yves Rodrigues MD;  Location:  HEART CARDIAC CATH LAB    CV RIGHT HEART CATH MEASUREMENTS RECORDED N/A 04/24/2019    Procedure: CV RIGHT HEART CATH;  Surgeon: Isiah Mcallister MD;  Location:  HEART CARDIAC CATH LAB    CV RIGHT HEART CATH MEASUREMENTS RECORDED N/A 06/04/2019    Procedure: CV RIGHT HEART CATH;  Surgeon: Alton Solomon MD;  Location:  HEART CARDIAC CATH LAB    CV RIGHT HEART CATH MEASUREMENTS RECORDED N/A 05/22/2019    Procedure: CV RIGHT HEART CATH;  Surgeon: Yves Rodrigues MD;  Location:  HEART CARDIAC CATH LAB    CV RIGHT HEART CATH MEASUREMENTS RECORDED N/A 08/13/2019    Procedure: CV RIGHT HEART CATH;  Surgeon: Jackson Patten MD;  Location:  HEART CARDIAC CATH LAB    CV RIGHT HEART CATH MEASUREMENTS RECORDED N/A 10/15/2019    Procedure: CV RIGHT HEART CATH;  Surgeon: Santino Mckeon MD;  Location:  HEART CARDIAC CATH LAB    CV RIGHT HEART CATH MEASUREMENTS RECORDED N/A 02/12/2020    Procedure: CV RIGHT HEART CATH;  Surgeon: Isiah Mcallister MD;  Location:  HEART CARDIAC CATH LAB    CV RIGHT HEART CATH MEASUREMENTS RECORDED N/A 03/17/2021    Procedure: CV RIGHT HEART CATH;  Surgeon: Santino Mckeon MD;  Location:  HEART CARDIAC CATH LAB    CV RIGHT HEART CATH MEASUREMENTS RECORDED N/A 2/23/2022    Procedure: CV RIGHT HEART CATH;  Surgeon: Yves Rodrigues MD;  Location:  HEART CARDIAC CATH LAB    CV RIGHT HEART CATH MEASUREMENTS RECORDED N/A 5/10/2022    Procedure: Right Heart Catheterization;  Surgeon: Yves Rodrigues MD;  Location:  HEART CARDIAC CATH LAB    CV RIGHT HEART CATH MEASUREMENTS RECORDED N/A 2/23/2023    Procedure: Right Heart Catheterization;  Surgeon: Santino Mckeon MD;  Location:  HEART CARDIAC CATH LAB    ESOPHAGOSCOPY, GASTROSCOPY, DUODENOSCOPY (EGD),  COMBINED N/A 12/19/2017    Procedure: ESOPHAGOGASTRODUODENOSCOPY (EGD) with biopsies;  Surgeon: Gael Romero MD;  Location: [facility];  Service:     ESOPHAGOSCOPY, GASTROSCOPY, DUODENOSCOPY (EGD), COMBINED N/A 3/8/2022    Procedure: ESOPHAGOGASTRODUODENOSCOPY, WITH BIOPSY;  Surgeon: Paddy Saldivar DO;  Location: [facility]    H STATISTIC PICC LINE INSERTION >5YR, FAILED Bilateral 10/28/2021    L sided SVC    HAND SURGERY Left     HC REVISE MEDIAN N/CARPAL TUNNEL SURG  09/21/2016    Procedure: OPEN RIGHT CARPAL TUNNEL RELEASE CORTISONE INJECTION RIGHT THUMB;  Surgeon: Duong Santoyo MD;  Location: [facility] OR;  Service: Orthopedics    INCISION AND DRAINAGE CHEST WASHOUT, COMBINED N/A 03/21/2019    Procedure: Incision And Drainage; Evacuation Right Chest Wall Hematoma;  Surgeon: Dewayne House MD;  [facility]: [facility]    INSERT INTRAAORTIC BALLOON PUMP Left 02/19/2019    Procedure: Insert left  Subclavian Balloon Pump,  Removal Right femoral arterial balloom pump sheath;  Surgeon: Dewayne House MD;  [facility]    INSERT INTRAAORTIC BALLOON PUMP Left 02/21/2019    Procedure: SUBCLAVIAN BALLOON PUMP PLACEMENT;  Surgeon: Ben White MD;  Location: [facility]    INSERT INTRACORONARY STENT      x2    IR PICC PLACEMENT > 5 YRS OF AGE  05/08/2019    IR PICC PLACEMENT > 5 YRS OF AGE  9/21/2024    TRANSPLANT HEART RECIPIENT N/A 02/24/2019    Procedure: TRANSPLANT HEART RECIPIENT;  Surgeon: Dewayne House MD;  Location: [facility]       Family History   Problem Relation Age of Onset    Skin Cancer Mother     Endometrial Cancer Mother     Osteoporosis Mother     Hypertension Father     Endometrial Cancer Maternal Grandmother     Hip fracture No family hx of     Nephrolithiasis No family hx of        Social History     Tobacco Use    Smoking status: Former     Current packs/day: 0.00     Average packs/day: 1 pack/day for 28.8 years (28.8 ttl pk-yrs)     Types: Cigarettes     Start date: 1/1/1980      Quit date: 11/6/2008     Years since quitting: 15.9     Passive exposure: Past    Smokeless tobacco: Never   Substance Use Topics    Alcohol use: Yes     Alcohol/week: 1.0 - 2.0 standard drink of alcohol     Types: 1 - 2 Cans of beer per week       MEDICATIONS:  Current Outpatient Medications   Medication Sig Dispense Refill    acetaminophen (TYLENOL) 325 MG tablet Take 2 tablets (650 mg) by mouth every 4 hours as needed for mild pain      albuterol (PROAIR HFA/PROVENTIL HFA/VENTOLIN HFA) 108 (90 Base) MCG/ACT inhaler Inhale 2 puffs into the lungs every 6 hours as needed for shortness of breath or wheezing 18 g 5    amLODIPine (NORVASC) 5 MG tablet Take 2 tablets (10 mg) by mouth daily 180 tablet 11    amoxicillin-clavulanate (AUGMENTIN) 875-125 MG tablet Take 1 tablet by mouth every 12 hours. 18 tablet 0    aspirin (ASA) 81 MG chewable tablet Take 1 tablet (81 mg) by mouth daily      azaTHIOprine 75 MG TABS Take 75 mg by mouth daily. PLEASE CONFIRM THE DOSE WITH YOUR TRANSPLANT TEAM      calcium carbonate (OS-RADAMES) 500 MG tablet Take 1 tablet by mouth daily.      cetirizine (ZYRTEC) 10 MG tablet Take 10 mg by mouth daily      clindamycin (CLINDAMAX) 1 % external gel Apply twice daily as needed for acne on the spots 60 g 11    doxycycline monohydrate (MONODOX) 100 MG capsule Take 1 capsule (100 mg) by mouth every 12 hours. 18 capsule 0    DULoxetine (CYMBALTA) 20 MG capsule Take 1 capsule (20 mg) by mouth daily 90 capsule 2    fluticasone (FLOVENT HFA) 220 MCG/ACT inhaler INHALE 2 PUFFS INTO THE LUNGS TWICE A DAY 12 g 3    Fluticasone Propionate, Inhal, (FLUTICASONE PROPIONATE DISKUS) 100 MCG/ACT AEPB INHALE 1 PUFF INTO THE LUNGS EVERY 12 HOURS. 60 each 3    levothyroxine (SYNTHROID/LEVOTHROID) 75 MCG tablet Take 1 tablet (75 mcg) by mouth daily 90 tablet 3    losartan (COZAAR) 25 MG tablet Take 3 tablets (75 mg) by mouth daily 270 tablet 3    melatonin 3 MG tablet Take 3 mg by mouth nightly as needed for sleep.       mometasone furoate (ASMANEX HFA) 100 MCG/ACT inhaler INHALE 2 PUFFS INTO THE LUNGS TWICE A DAY 13 g 3    multivitamin w/minerals (THERA-VIT-M) tablet Take 1 tablet by mouth daily      omeprazole (PRILOSEC) 40 MG DR capsule TAKE 1 CAPSULE BY MOUTH EVERY DAY 30 capsule 11    RESTASIS 0.05 % ophthalmic emulsion Place 1 drop into both eyes 2 times daily as needed for dry eyes.      rosuvastatin (CRESTOR) 40 MG tablet Take 1 tablet (40 mg) by mouth daily 90 tablet 3    sildenafil (VIAGRA) 50 MG tablet Take 1 tablet (50 mg) by mouth daily as needed (Erectile dysfunction) 10 tablet 11    sirolimus (GENERIC EQUIVALENT) 1 MG tablet Take 1 tablet (1 mg) by mouth daily. 90 tablet 3    tadalafil (CIALIS) 10 MG tablet Take 1 tablet (10 mg) by mouth daily as needed (Erectile dysfunction) 10 tablet 3       EXAM:   /76 (BP Location: Right arm, Patient Position: Chair, Cuff Size: Adult Regular)   Pulse 108   Wt 86.6 kg (191 lb)   SpO2 98%   BMI 29.04 kg/m    General: appears comfortable, alert and interactive, in no acute distress  Head: normocephalic, atraumatic, mild swelling along right cheek and bottom lip  Eyes: anicteric sclera, EOMI  Mouth: teeth intact, no tenderness or sores in the mouth  Neck: supple, no cervical adenopathy  CV: regular rate and rhythm, S1/S2, no murmur, gallop, rub, estimated JVP ~7 cm  Resp: clear, no rales or wheezing  GI: soft, nontender, nondistended, +BS  Extremities: warm, no peripheral edema, 2+ bilateral radial pulses  Neurological: normal speech and affect, no gross motor deficits  Psych: normal mood and affect  Derm: no rashes or lesions on exposed surfaces    I personally reviewed recent labs and data as below and discussed the results with the patient in clinic today.  Labs:  CBC RESULTS:  Lab Results   Component Value Date    WBC 4.3 09/30/2024    WBC 5.0 04/09/2021    RBC 3.63 (L) 09/30/2024    RBC 4.61 04/09/2021    HGB 10.1 (L) 09/30/2024    HGB 11.2 (A) 04/09/2021    HCT  30.4 (L) 09/30/2024    HCT 34.6 04/09/2021    MCV 84 09/30/2024    MCV 75 04/09/2021    MCH 27.8 09/30/2024    MCH 24.3 04/09/2021    MCHC 33.2 09/30/2024    MCHC 32.4 04/09/2021    RDW 14.3 09/30/2024    RDW 14.9 04/09/2021     09/30/2024     04/09/2021       CMP RESULTS:  Lab Results   Component Value Date     09/30/2024     03/17/2021    POTASSIUM 4.5 09/30/2024    POTASSIUM 4.4 05/28/2022    POTASSIUM 3.9 03/17/2021    CHLORIDE 103 09/30/2024    CHLORIDE 107 05/28/2022    CHLORIDE 106 03/17/2021    CO2 19 (L) 09/30/2024    CO2 24 05/28/2022    CO2 24 03/17/2021    ANIONGAP 14 09/30/2024    ANIONGAP 6 05/28/2022    ANIONGAP 8 03/17/2021    GLC 86 09/30/2024    GLC 97 05/28/2022    GLC 85 03/17/2021    BUN 35.5 (H) 09/30/2024    BUN 31 (H) 05/28/2022    BUN 32 (H) 03/17/2021    CR 2.61 (H) 09/30/2024    CR 1.80 (H) 03/17/2021    GFRESTIMATED 27 (L) 09/30/2024    GFRESTIMATED 34 (L) 06/22/2021    GFRESTIMATED 41 (L) 03/17/2021    GFRESTBLACK 41 (L) 06/22/2021    GFRESTBLACK 47 (L) 03/17/2021    RADAMES 8.9 09/30/2024    RADAMES 9.0 03/17/2021    BILITOTAL 0.4 09/23/2024    BILITOTAL 0.5 03/17/2021    ALBUMIN 3.1 (L) 09/23/2024    ALBUMIN 3.2 (L) 02/23/2022    ALBUMIN 3.6 03/17/2021    ALKPHOS 123 09/23/2024    ALKPHOS 131 03/17/2021    ALT 43 09/23/2024    ALT 49 03/17/2021    AST 67 (H) 09/23/2024    AST 48 (H) 03/17/2021        INR RESULTS:  Lab Results   Component Value Date    INR 1.17 (H) 09/23/2019       Lab Results   Component Value Date    MAG 1.9 09/25/2024    MAG 1.6 03/17/2021     Lab Results   Component Value Date    NTBNPI 1,445 (H) 09/20/2024    NTBNPI 767 09/23/2019     Lab Results   Component Value Date    NTBNP 10,986 (H) 11/15/2018     TTE 9/21/24  Interpretation Summary  Left ventricular size, wall motion and function are normal. The ejection  fraction is 60-65%.  Mildly decreased right ventricular systolic function  The rhythm was sinus tachycardia.  Right ventricular systolic  pressure could not be approximated due to  inadequate tricuspid regurgitation.  No hemodynamically significant valvular abnormalities on 2D or color flow imaging.    cMRI 4/18/24  SUMMARY   ==========================================================================================================     Clinical history: 59-year old male with a history of orthotopic heart transplantation in 2019. His last  coronary angiogram in 02/2023 was devoid of coronary allograft vasculopathy. CMR for graft surveillance.  Comparison CMR: 02/23/2022     1. The LV is normal in cavity size and wall thickness. The global systolic function is normal. The LVEF is  54%. There are no regional wall motion abnormalities.     2. The RV is normal in cavity size. The global systolic function is normal. The RVEF is 54%.      3. The left atrium is mildly enlarged due to transplantation. The right atrium is normal in size.      4. There is no significant valvular disease.      5. Late gadolinium enhancement imaging shows a small burden of patchy, midmyocardial fibrosis in the basal  inferoseptal segment. This is of unclear significance.     6. Regadenoson stress perfusion imaging shows no ischemia.     7.  There is no pericardial effusion or thickening.     8. There is no intracardiac thrombus.     CONCLUSIONS: Orthotopic heart transplant. Normal biventricular graft function without ischemia. there is a  small burden of fibrosis of unclear significance. Upon direct visual comparison to the prior examinations  dated 03/17/2021 and 02/23/2022, there are no significant changes in the graft function or the degree of  fibrosis.     TTE 2/23/23  Interpretation Summary  Left ventricular size, wall motion and function are normal. The ejection  fraction is 55-60%.  On direct comparison, the global right ventricular function appears mildly  reduced compared to 2022, but TAPSE and S' values are similar.  No significant valve abnormalities.  The inferior vena  cava is normal.  No pericardial effusion.    Coronary Angiogram/RHC 2/23/23  Coronary Findings    Diagnostic  Dominance: Right  Left Main   The vessel is large. There was 0% vessel disease.      Left Anterior Descending   The vessel is large. There was 0% vessel disease.      Left Circumflex   The vessel is moderate in size. There was 0% vessel disease.      First Obtuse Marginal Branch   The vessel is large.      Right Coronary Artery   The vessel is large. There was 0% vessel disease.         Intervention     No interventions have been documented.     Hemodynamics    Right Heart Catheterization:  /89/114 mmHg  HR 92 BPM    RA 7/11/7 mmHg  RV 40/7 mmHg  PA 40/19/28 mmHg  PCW 17/24/17 mmHg  Jaden CO 7.13 L/min Normal = 4.0-8.0 L/min  Jaden CI 3.64 L/min/m2 Normal = 2.5-4.0 L/min/m2  TD CO 6.93 L/min Normal = 4.0-8.0 L/min  TD CI 3.54 L/min/m2 Normal = 2.5-4.0 L/min/m2  PA sat 72.4%   Hgb 10.7 g/dL   PVR 1.71 Woods units  SVR 1021 dynes-sec/cm5     ECG 2/23/23 shows sinus rhythm with nonspecific intraventricular conduction block, no acute ST-T changes    RHC 2/23/22  RA: 10  RV: 31 (11)  PA: 30/14 (21)  PVR: 0.91  Jaden CO: 6.3    CI: 3.3  TDCO: 7.4       CI: 3.8     Angiogram 2/23/22  Left Main   The vessel is large. There was 0% vessel disease.   Left Anterior Descending   The vessel is large. There was 0% vessel disease.   Left Circumflex   The vessel is moderate in size. There was 0% vessel disease.   Right Coronary Artery   The vessel is large. There was 0% vessel disease.     CMR 2/23/22  Clinical history: 56-year-old man post orthotopic heart transplantation in 2019  Comparison CMR: March 2021  1. The left ventricle is normal in cavity size and wall thickness. There are no regional wall motion  abnormalities. The global systolic function is normal. The LVEF is 55%.  2. The right ventricle is normal in cavity size. The global systolic function is normal. The RVEF is 54%.   3. The left atrium is enlarged due  to transplantation and the right atrium is normal in size.  4. There is no significant valvular disease.   5. There is patchy midmyocardial late gadolinium enhancement at the basal-mid RV insertion sites consistent with non ischemic fibrosis.    6. There is no ischemia on stress perfusion imaging.  7. There is no pericardial effusion.  8. There is no intracardiac thrombus.  CONCLUSIONS:   Post heart transplantation.  Normal biventricular size and function, LVEF 55 % and RVEF 54%.   No evidence of myocardial ischemia.  No significant changes when compared to prior study from 2021    Echo 8/31/21  Interpretation Summary  Global and regional left ventricular function is normal with an EF of 60-65%.  Global right ventricular function is normal.  Pulmonary artery systolic pressure is normal.  The inferior vena cava was normal in size with preserved respiratory variability.  No pericardial effusion is present.    RHC and angiography 2/12/2020  Pressures Phase   Time  Systolic  Diastolic  Mean  A Wave  V Wave  EDP  Max dp/dt  HR    RA Pressures  10:37 AM    7 mmHg     9 mmHg     7 mmHg       95 bpm       RV Pressures  10:37 AM  30 mmHg         9 mmHg      95 bpm       PA Pressures  10:38 AM  30 mmHg     16 mmHg     20 mmHg         94 bpm       PCW Pressures  10:38 AM    12 mmHg     13 mmHg     13 mmHg       95 bpm       AO Pressures  11:00 AM  94 mmHg     72 mmHg     83 mmHg         98 bpm       Art Pressures  10:59 AM  120 mmHg     74 mmHg     91 mmHg         103 bpm       LV Pressures  10:59 AM  120 mmHg         17 mmHg      102 bpm          Time  Hb  SAT(%)  PO2  Content  PA Sat    PA  10:21 AM   71.5 %       71.5 %       Art  10:21 AM   100 %      14.28 mL/dL        Cardiac Output    Time  TDCO  TDCI  Jaden C.O.  Jaden C.I.  Jaden HR    Cardiac Output Results  10:21 AM  6.77 L/min     3.74 L/min/m2     5.68 L/min     3.13 L/min/m2            Left Main    The vessel was visualized by selective angiography and is large.  There was 0% vessel disease.    Left Anterior Descending    The vessel was visualized by selective angiography and is large. There was 0% vessel disease. IVUS was performed on the LMCA and LAD vessels. The LMCA was engaged with a 6 Fr Ikari LF 3.5 GC and the patient was anticoagulated with IV UFH. A BMW was passed into the distal LAD without difficulty. The Circle Eye IVUS was passed over the wire and manually pulled back while recording. Proximal LAD HERACLIO 0.3 Lumen 24.1 Vessel 29.8 19% area stenosis Mid LAD HERACLIO 0.3 Lumen 10.8 Vessel 13.7 21% area stenosis IVUS was performed on the vessel. Ultrasound supply: [facility] [employer] IVUS SHRT.    Left Circumflex    The vessel was visualized by selective angiography and is moderate in size. There was 0% vessel disease.    Right Coronary Artery    The vessel was visualized by selective angiography and is large. There was 0% vessel disease.    Conclusion  Mild intimal hyperplasia with up to 0.3 mm HERACLIO and 20% narrowing by area.    ECHO 12/18/2019  Interpretation Summary  Left ventricular function, chamber size, wall motion, and wall thickness are  normal.The EF is 60-65%.  Right ventricular function, chamber size, wall motion, and thickness are  normal.  No significant valvular abnormalities were noted.  Pulmonary artery systolic pressure is normal.  The inferior vena cava was normal in size with preserved respiratory  variability.  No pericardial effusion is present.      RHC and EMB 10/15/19:  Conclusion     Right sided filling pressures are normal.  Left sided filling pressures are normal.  Normal PA pressures.  Normal cardiac output level.  Successful collection of endomyocardial biopsies          Plan      Follow bedrest per protocol   Continued medical management and lifestyle modifications for cardiovascular risk factor optimizations.   Discharge today per protocol   Hemodynamics     Right Heart Pressures     Right sided filling pressures are normal.Left sided filling  pressures are normal. Normal PA pressures.Normal cardiac output level.   Heart Biospy     Heart Biopsy     After informed consent patient was prepped and draped in the usual fashion. Under fluoroscopy guidance a 7 Fr angeled sheath was placed into the right internal jugular vein after local anesthesia with 1.0% lidocaine. 5.5 Syriac Jawz bioptome was passed through the sheath to the right ventricular septum and 5 biopsies were obtained. The biopsy specimens were sent to Pathology for examination. The sheath was removed and hemostasis was obtained. The patient tolerated the procedure well and there were no complications.   Pressures Phase: Baseline      Time Systolic Diastolic Mean A Wave V Wave EDP Max dp/dt HR   RA Pressures 11:49 AM   2 mmHg    3 mmHg    4 mmHg      105 bpm      RV Pressures 11:35 AM       1776 mmHg/sec        11:49 AM 22 mmHg        4 mmHg     105 bpm      PA Pressures 11:50 AM 20 mmHg    10 mmHg    14 mmHg        105 bpm      PCW Pressures 11:49 AM   6 mmHg    7 mmHg    7 mmHg      105 bpm      Blood Flow Results Phase: Baseline      Time Results Indexed Values   QP 11:35 AM 5.92 L/min    3.66 L/min/m2      QS 11:35 AM 5.92 L/min    3.66 L/min/m2      Blood Oximetry Phase: Baseline      Time Hb SAT(%) PO2 Content PA Sat   PA 11:35 AM  66.6 %      66.6 %      Art 11:35 AM  100 %     10.74 mL/dL       Cardiac Output Phase: Baseline      Time TDCO TDCI Jaden C.O. Jaden C.I. Jaden HR   Cardiac Output Results 11:35 AM 6.03 L/min    3.74 L/min/m2    5.92 L/min    3.66 L/min/m2        11:51 AM 6.03 L/min          Resistance Results Phase: Baseline      Time PVR SVR PVR-I SVR-I TPR TVR TPR-I TVR-I PVR/SVR TPR/TVR   Resistance Results (Metric) 11:35 .64 dsc-5     172.25 dsc-5/m2     186.62 dsc-5     301.45 dsc-5/m2         Resistance Results (Wood) 11:35 AM 1.33 MARTIN     2.15 MARTIN/m2     2.33 MARTIN     3.77 MARTIN/m2         Stoke Volume Results Phase: Baseline      Time RVSW LVSW RVSW-I LVSW-I   Stroke Work  Results 11:35 AM 9.33 gm*m     5.77 gm*m/m2         TTE 10/15/19:  Interpretation Summary  Left ventricular function, chamber size, wall motion, and wall thickness are  normal.The EF is 60-65%. No regional wall motion abnormalities are seen.  Right ventricular function, chamber size, wall motion, and thickness are  normal.  Trace tricuspid insufficiency is present. Pulmonary artery systolic pressure  is normal. The inferior vena cava was normal in size with preserved  respiratory variability. No pericardial effusion is present.  There has been no change.    Assessment and Plan:    59 year old male with a history of ASD (s/p repair in childhood), AFib/AF (s/p ablation), NICM, diastolic dysfunction, alcohol abuse, Pierce's esophagus, ulcerative colitis, GIB (s/p splenic embolization), and hypothyroidism, now s/p OHT and bypass of persistent left SVC to right atrial appendage 2/24/19 who presents for ongoing evaluation and management.    NICM s/p OHT and bypass of persistent left SVC to right atrial appendage 2/24/19  His postoperative course was c/b shock due to RV dysfunction requiring IABP support, small pneumoperitoneum (clinically insignificant), chest wall hematoma (former ICD site, s/p evacuation and drain placement 3/31/19), HCAP/klebsiella pneumonia, and FRANKLIN (required short-term HD, now recovered). His biopsy 3/25/19 showed mild AMR1 with some staining for c4d.  Repeat biopsy 3/28/19 showed improved AMR and less reactive capillary staining, and subsequent biopsies have now shown resolution of AMR and improved capillary staining.     Today patient is euvolemic by history and exam. Overall doing well. No CAV and grossly normal filling pressures with normal cardiac index/output on last surveillance testing.     Serostatus:  - CMV D+/R+  - EBV D+/R+  - Toxo D-/R-     Immunosuppression:  - Sirolimus 4 mg daily, goal level 4-6, level pending from today  - Azathioprine 75 mg daily, recently decreased during  infection, but will resume 75 mg dose now     Graft function:  - BPs: Controlled at home, continue losartan 75 mg daily. Instructed patient to call clinic if BP consistently > 140/90  - HRs:  Stable  - fluid status:  Euvolemic, off diuretics      PPx:  - CAV: Aspirin 81mg daily and rosuvastatin 40 mg daily  - GERD:  Omeprazole (note h/o carrera's esophagus)  - Osteoporosis:  Calcium/vitamin D supplements     Routine screenings  Derm: UTD  Dental: UTD  Colonoscopy: completed 3/8/2022, every 3 years  EGD: 3/8/2022, every 3 years  Prostate:  stable  Eye: UTD  Flu/Pneumonia: DUE  Covid: UTD     Ulcerative Colitis  - Followed by GI    Chronic Kidney Disease Stage III  - Follows with Renal    Chronic Sinus Infections  - Follows with ENT  - Discussed that if any surgeries were to be planned in the future, would need to switch sirolimus back to tac for wound healing    Chronic Right Shoulder Pain  - Follows with Ortho    Optimal Vascular Metrics    Blood Pressure   BP < 140/90 Yes    On Aspirin  Yes    On Statin  Yes    Tobacco use  No     To Do:   - Resume AZA 75 mg daily  - Follow up with Nephrology  - Drink  oz fluids daily  - Repeat labs in one week  - Follow up in March '25 with cMRI and annual testing    A total of 48 minutes was spent on the day of the visit, which includes preparation for the visit (reviewing previous medical records, laboratories and investigations), in conjunction with the actual clinic visit with the patient, which includes obtaining a history and physical exam, creating and reviewing the care plan, patient education (and family if present), counseling, documenting clinical information in the electronic health record and care coordination.     The longitudinal plan of care for the diagnosis(es)/condition(s) as documented were addressed during this visit. Due to the added complexity in care, I will continue to support [place] in the subsequent management and with ongoing continuity of care.      Slime Lindsay MD   of Medicine, [facility]  Advanced Heart Failure and Transplant Cardiology     CC  Fredrick Wolff

## 2024-10-07 ENCOUNTER — LAB (OUTPATIENT)
Dept: LAB | Facility: HOSPITAL | Age: 59
End: 2024-10-07
Payer: COMMERCIAL

## 2024-10-07 DIAGNOSIS — Z94.1 TRANSPLANTED HEART (H): ICD-10-CM

## 2024-10-07 LAB
ALBUMIN SERPL BCG-MCNC: 3.9 G/DL (ref 3.5–5.2)
ALP SERPL-CCNC: 125 U/L (ref 40–150)
ALT SERPL W P-5'-P-CCNC: 40 U/L (ref 0–70)
ANION GAP SERPL CALCULATED.3IONS-SCNC: 14 MMOL/L (ref 7–15)
AST SERPL W P-5'-P-CCNC: 27 U/L (ref 0–45)
BASOPHILS # BLD AUTO: 0 10E3/UL (ref 0–0.2)
BASOPHILS NFR BLD AUTO: 1 %
BILIRUB SERPL-MCNC: 0.2 MG/DL
BUN SERPL-MCNC: 33.6 MG/DL (ref 8–23)
CALCIUM SERPL-MCNC: 8.8 MG/DL (ref 8.8–10.4)
CHLORIDE SERPL-SCNC: 104 MMOL/L (ref 98–107)
CREAT SERPL-MCNC: 2.75 MG/DL (ref 0.67–1.17)
EGFRCR SERPLBLD CKD-EPI 2021: 25 ML/MIN/1.73M2
EOSINOPHIL # BLD AUTO: 0.1 10E3/UL (ref 0–0.7)
EOSINOPHIL NFR BLD AUTO: 1 %
ERYTHROCYTE [DISTWIDTH] IN BLOOD BY AUTOMATED COUNT: 14.7 % (ref 10–15)
GLUCOSE SERPL-MCNC: 114 MG/DL (ref 70–99)
HCO3 SERPL-SCNC: 20 MMOL/L (ref 22–29)
HCT VFR BLD AUTO: 29.9 % (ref 40–53)
HGB BLD-MCNC: 9.8 G/DL (ref 13.3–17.7)
IMM GRANULOCYTES # BLD: 0 10E3/UL
IMM GRANULOCYTES NFR BLD: 0 %
LYMPHOCYTES # BLD AUTO: 1 10E3/UL (ref 0.8–5.3)
LYMPHOCYTES NFR BLD AUTO: 20 %
MCH RBC QN AUTO: 28 PG (ref 26.5–33)
MCHC RBC AUTO-ENTMCNC: 32.8 G/DL (ref 31.5–36.5)
MCV RBC AUTO: 85 FL (ref 78–100)
MONOCYTES # BLD AUTO: 0.3 10E3/UL (ref 0–1.3)
MONOCYTES NFR BLD AUTO: 7 %
NEUTROPHILS # BLD AUTO: 3.7 10E3/UL (ref 1.6–8.3)
NEUTROPHILS NFR BLD AUTO: 72 %
NRBC # BLD AUTO: 0 10E3/UL
NRBC BLD AUTO-RTO: 0 /100
PLATELET # BLD AUTO: 232 10E3/UL (ref 150–450)
POTASSIUM SERPL-SCNC: 4.9 MMOL/L (ref 3.4–5.3)
PROT SERPL-MCNC: 7 G/DL (ref 6.4–8.3)
RBC # BLD AUTO: 3.5 10E6/UL (ref 4.4–5.9)
SIROLIMUS BLD-MCNC: 5.3 UG/L (ref 5–15)
SODIUM SERPL-SCNC: 138 MMOL/L (ref 135–145)
TME LAST DOSE: NORMAL H
TME LAST DOSE: NORMAL H
WBC # BLD AUTO: 5.1 10E3/UL (ref 4–11)

## 2024-10-07 PROCEDURE — 80195 ASSAY OF SIROLIMUS: CPT

## 2024-10-07 PROCEDURE — 85004 AUTOMATED DIFF WBC COUNT: CPT

## 2024-10-07 PROCEDURE — 82040 ASSAY OF SERUM ALBUMIN: CPT

## 2024-10-07 PROCEDURE — 36415 COLL VENOUS BLD VENIPUNCTURE: CPT

## 2024-10-08 DIAGNOSIS — N25.0 RENAL OSTEODYSTROPHY: ICD-10-CM

## 2024-10-08 DIAGNOSIS — N18.32 CHRONIC KIDNEY DISEASE (CKD) STAGE G3B/A1, MODERATELY DECREASED GLOMERULAR FILTRATION RATE (GFR) BETWEEN 30-44 ML/MIN/1.73 SQUARE METER AND ALBUMINURIA CREATININE RATIO LESS THAN 30 MG/G (H): Primary | ICD-10-CM

## 2024-10-08 DIAGNOSIS — D63.1 ANEMIA OF CHRONIC RENAL FAILURE: ICD-10-CM

## 2024-10-08 DIAGNOSIS — I10 ESSENTIAL HYPERTENSION, MALIGNANT: ICD-10-CM

## 2024-10-08 DIAGNOSIS — N18.9 ANEMIA OF CHRONIC RENAL FAILURE: ICD-10-CM

## 2024-10-15 ENCOUNTER — OFFICE VISIT (OUTPATIENT)
Dept: INTERNAL MEDICINE | Facility: CLINIC | Age: 59
End: 2024-10-15
Payer: COMMERCIAL

## 2024-10-15 VITALS
TEMPERATURE: 97.9 F | WEIGHT: 190 LBS | SYSTOLIC BLOOD PRESSURE: 124 MMHG | RESPIRATION RATE: 16 BRPM | DIASTOLIC BLOOD PRESSURE: 76 MMHG | HEIGHT: 68 IN | HEART RATE: 85 BPM | OXYGEN SATURATION: 98 % | BODY MASS INDEX: 28.79 KG/M2

## 2024-10-15 DIAGNOSIS — N18.30 CHRONIC RENAL INSUFFICIENCY, STAGE 3 (MODERATE) (H): ICD-10-CM

## 2024-10-15 DIAGNOSIS — I10 ESSENTIAL HYPERTENSION: ICD-10-CM

## 2024-10-15 DIAGNOSIS — R19.7 DIARRHEA, UNSPECIFIED TYPE: Primary | ICD-10-CM

## 2024-10-15 DIAGNOSIS — Z87.19 HISTORY OF ULCERATIVE COLITIS: ICD-10-CM

## 2024-10-15 DIAGNOSIS — Z87.01 HISTORY OF PNEUMONIA: ICD-10-CM

## 2024-10-15 DIAGNOSIS — J45.20 MILD INTERMITTENT ASTHMA WITHOUT COMPLICATION: ICD-10-CM

## 2024-10-15 PROCEDURE — 99214 OFFICE O/P EST MOD 30 MIN: CPT | Performed by: INTERNAL MEDICINE

## 2024-10-15 RX ORDER — FERROUS SULFATE 325(65) MG
325 TABLET, DELAYED RELEASE (ENTERIC COATED) ORAL
COMMUNITY
Start: 2024-03-14 | End: 2025-03-14

## 2024-10-15 ASSESSMENT — ASTHMA QUESTIONNAIRES
ACT_TOTALSCORE: 18
ACT_TOTALSCORE: 18
QUESTION_4 LAST FOUR WEEKS HOW OFTEN HAVE YOU USED YOUR RESCUE INHALER OR NEBULIZER MEDICATION (SUCH AS ALBUTEROL): ONCE A WEEK OR LESS
QUESTION_3 LAST FOUR WEEKS HOW OFTEN DID YOUR ASTHMA SYMPTOMS (WHEEZING, COUGHING, SHORTNESS OF BREATH, CHEST TIGHTNESS OR PAIN) WAKE YOU UP AT NIGHT OR EARLIER THAN USUAL IN THE MORNING: ONCE OR TWICE
QUESTION_5 LAST FOUR WEEKS HOW WOULD YOU RATE YOUR ASTHMA CONTROL: WELL CONTROLLED
QUESTION_1 LAST FOUR WEEKS HOW MUCH OF THE TIME DID YOUR ASTHMA KEEP YOU FROM GETTING AS MUCH DONE AT WORK, SCHOOL OR AT HOME: NONE OF THE TIME
QUESTION_2 LAST FOUR WEEKS HOW OFTEN HAVE YOU HAD SHORTNESS OF BREATH: MORE THAN ONCE A DAY

## 2024-10-15 ASSESSMENT — PATIENT HEALTH QUESTIONNAIRE - PHQ9
SUM OF ALL RESPONSES TO PHQ QUESTIONS 1-9: 6
10. IF YOU CHECKED OFF ANY PROBLEMS, HOW DIFFICULT HAVE THESE PROBLEMS MADE IT FOR YOU TO DO YOUR WORK, TAKE CARE OF THINGS AT HOME, OR GET ALONG WITH OTHER PEOPLE: NOT DIFFICULT AT ALL
SUM OF ALL RESPONSES TO PHQ QUESTIONS 1-9: 6

## 2024-10-15 NOTE — PROGRESS NOTES
Everton Larios   59 year old male    Date of Visit: 10/15/2024    Chief Complaint   Patient presents with    Abdominal Pain     Diarrhea for 1 month     Subjective  59-year-old male with cardiac transplant on immunosuppression developed community-acquired left lower lobe pneumonia was hospitalized on September 20.  Negative for Legionella and COVID.  Treated with Zosyn and azithromycin, changed to Augmentin and doxycycline.  Finished antibiotics last week.    He had loose stools ever since being in the hospital with moderate semiformed stools, occasionally worse.  No bloody stools or significant abdominal cramping.    He started getting his appetite back last week and was back at work and was eating more normal food.  This included peach cups and ice cream, eating more milk and cereal.    His diarrhea significantly worsened last Friday, 4 days ago, and was having more than 5 watery bowel movements a day over the last weekend.    He started taking Imodium yesterday and has not had a bowel movement since last evening.  Just some mild abdominal bloating discomfort but not severe.  No fever.    He has ulcerative colitis has been in remission for quite some time and is not on treatment for that.  He has been on immunosuppression for his cardiac transplant.    Last colonoscopy in 2022 showed inactive ulcerative colitis, otherwise negative.    No shortness of breath residual.  No recurrent fever.  No significant cough.    On September 20 he was tested for C. difficile and was negative.  Enteric bacteria workup was also negative.    He had labs done at cardiology on October 7 with a creatinine stable at 2.75.  Normal potassium.  Normal liver test.    He has moderate chronic renal sufficiency since acute tubular necrosis with shock with previous cardiac surgery.    No flare of asthma.  He had COVID last winter.    September 2024 heart echo was stable with ejection fraction 60 to 65% and no valve changes.    PMHx:    Past  Medical History:   Diagnosis Date    Alcohol abuse     Arthritis     hands, neck    ASD (atrial septal defect)     s/p repair age 3    Asthma     Atrial fibrillation (H)     Pierce's esophagus 10/4/2018    Pierce's esophagus     Cataract     CHF (congestive heart failure) (H)     Chronic rhinitis 10/4/2018    Chronic systolic heart failure (H) 10/4/2018    Clotting disorder (H)     Congenital cardiomyopathy in  (H)     Depression 10/4/2018    Depression     Diastolic dysfunction     DJD (degenerative joint disease)     neck    DJD (degenerative joint disease) - neck 10/4/2018    H/O congenital atrial septal defect (ASD) repair at age 3 10/4/2018    History of anesthesia complications     nausea    History of transfusion     Hypothyroidism     Hypothyroidism due to medication 10/4/2018    ICD (implantable cardioverter-defibrillator) in place     ICD, [employer] ; gen change 2018 10/4/2018    Intermittent asthma without complication 10/4/2018    Nonischemic cardiomyopathy (H) 10/4/2018    On amiodarone therapy 10/4/2018    Pacemaker     Paroxysmal atrial fibrillation (H) 10/4/2018    S/P ablation of atrial fibrillation 10/4/2018    Systolic heart failure (H)     Ulcerative colitis (H)     Ulcerative colitis (H)     Ventricular tachycardia (H) 10/4/2018    Ventricular tachycardia (H)      PSHx:    Past Surgical History:   Procedure Laterality Date    ABLATION OF DYSRHYTHMIC FOCUS      for A fib    AICD, DUAL CHAMBER      ASD REPAIR  1968    BRONCHOSCOPY (RIGID OR FLEXIBLE), DIAGNOSTIC N/A 2019    Procedure: BRONCHOSCOPY, WITH BAL;  Surgeon: Margot Chiang MD;  Location:  GI    CARDIAC DEFIBRILLATOR PLACEMENT      x2--last was 2018    COLONOSCOPY N/A 2020    Procedure: COLONOSCOPY, WITH POLYPECTOMY AND BIOPSY;  Surgeon: Ranjeet Cooper MD;  Location:  GI    COLONOSCOPY N/A 2015    Procedure: COLONOSCOPY with biopsy;  Surgeon: Fernie Akers MD;   Location: [facility] GI;  Service:     COLONOSCOPY N/A 12/19/2017    Procedure: COLONOSCOPY with biopsies and polypectomies using snare;  Surgeon: Gael Romero MD;  Location: [facility] GI;  Service:     COLONOSCOPY N/A 3/8/2022    Procedure: COLONOSCOPY, WITH POLYPECTOMY AND BIOPSY;  Surgeon: Paddy Saldivar DO;  [facility]    CV CORONARY ANGIOGRAM N/A 02/12/2020    Procedure: CV CORONARY ANGIOGRAM;  Surgeon: Isiah Mcallister MD;  [facility]    CV CORONARY ANGIOGRAM N/A 03/17/2021    Procedure: CV CORONARY ANGIOGRAM;  Surgeon: Santino Mckeon MD;  [facility]    CV CORONARY ANGIOGRAM N/A 2/23/2022    Procedure: CV CORONARY ANGIOGRAM;  Surgeon: Yves Rodrigues MD;  [facility]    CV CORONARY ANGIOGRAM N/A 2/23/2023    Procedure: Coronary Angiogram;  Surgeon: Santino Mckeon MD;  [facility]    CV HEART BIOPSY N/A 03/04/2019    Procedure: Heart Biopsy;  Surgeon: Abhijeet Titus MD;  [facility]    CV HEART BIOPSY N/A 03/25/2019    Procedure: Heart Biopsy;  Surgeon: Yves Rodrigues MD;  [facility]    CV HEART BIOPSY N/A 03/28/2019    Procedure: Heart Cath Heart Biopsy;  Surgeon: Yves Rodrigues MD;  [facility]    CV HEART BIOPSY N/A 04/10/2019    Procedure: CV HEART BIOPSY;  Surgeon: Isiah Mcallister MD;  [facility]    CV HEART BIOPSY N/A 04/24/2019    Procedure: CV HEART BIOPSY;  Surgeon: Isiah Mcallister MD;  [facility]    CV HEART BIOPSY N/A 05/08/2019    Procedure: CV HEART BIOPSY;  Surgeon: Isiah Mcallister MD;  [facility]    CV HEART BIOPSY N/A 06/04/2019    Procedure: CV HEART BIOPSY;  Surgeon: Alton Solomon MD;  [facility]    CV HEART BIOPSY N/A 05/22/2019    Procedure: CV HEART BIOPSY;   Surgeon: Yves Rodrigues MD;  Location:  HEART CARDIAC CATH LAB    CV HEART BIOPSY N/A 07/17/2019    Procedure: CV HEART BIOPSY;  Surgeon: Isiah Mcallister MD;  [facility]    CV HEART BIOPSY N/A 08/13/2019    Procedure: CV HEART BIOPSY;  Surgeon: Jackson Patten MD;  [facility]    CV HEART BIOPSY N/A 10/15/2019    Procedure: CV HEART BIOPSY;  Surgeon: Santino Mckeon MD;  [facility]    CV HEART BIOPSY N/A 02/12/2020    Procedure: CV HEART BIOPSY;  Surgeon: Isiah Mcallister MD;  Location:  HEART CARDIAC CATH LAB    CV HEART BIOPSY N/A 05/05/2020    Procedure: CV HEART BIOPSY;  Surgeon: Yves Rodrigues MD;  Location:  HEART CARDIAC CATH LAB    CV HEART BIOPSY N/A 03/17/2021    Procedure: CV HEART BIOPSY;  Surgeon: Santino Mckeon MD;  Location:  HEART CARDIAC CATH LAB    CV HEART BIOPSY N/A 5/10/2022    Procedure: Heart Biopsy;  Surgeon: Yves Rodrigues MD;  Location:  HEART CARDIAC CATH LAB    CV INTRA AORTIC BALLOON N/A 02/18/2019    Procedure: Intra-Aortic Balloon;  Surgeon: Alton Solomon MD;  Location:  HEART CARDIAC CATH LAB    CV INTRA AORTIC BALLOON N/A 02/20/2019    Procedure: Replace subclavian IABP;  Surgeon: Yves Rodrigues MD;  Location:  HEART CARDIAC CATH LAB    CV INTRAVASULAR ULTRASOUND N/A 02/12/2020    Procedure: CV INTRAVASCULAR ULTRASOUND;  Surgeon: Isiah Mcallister MD;  Location:  HEART CARDIAC CATH LAB    CV LEFT HEART CATH N/A 03/19/2019    Procedure: Left Heart Cath;  Surgeon: Yves Rodrigues MD;  Location:  HEART CARDIAC CATH LAB    CV LEFT HEART CATH N/A 02/12/2020    Procedure: Left Heart Cath;  Surgeon: Isiah Mcallister MD;  Location:  HEART CARDIAC CATH LAB    CV MYOCARDIAL BIOPSY N/A 03/19/2019    Procedure: RHC/HBx - Femoral access for 3/19 per Nimisha GONZALEZ;  Surgeon: Yves Rodrigues MD;  Location:  HEART CARDIAC CATH LAB    CV  MYOCARDIAL BIOPSY N/A 03/11/2019    Procedure: ADD ON RHC/HBX;  Surgeon: Alton Solomon MD;  [facility]    CV RIGHT HEART CATH MEASUREMENTS RECORDED N/A 03/25/2019    Procedure: Right Heart Cath;  Surgeon: Yves Rodrigues MD;  [facility]    CV RIGHT HEART CATH MEASUREMENTS RECORDED N/A 03/28/2019    Procedure: Heart Cath Right Heart Cath;  Surgeon: Yves Rodrigues MD;  [facility]    CV RIGHT HEART CATH MEASUREMENTS RECORDED N/A 04/24/2019    Procedure: CV RIGHT HEART CATH;  Surgeon: Isiah Mcallister MD;  [facility]    CV RIGHT HEART CATH MEASUREMENTS RECORDED N/A 06/04/2019    Procedure: CV RIGHT HEART CATH;  Surgeon: Alton Solomon MD;  [facility]    CV RIGHT HEART CATH MEASUREMENTS RECORDED N/A 05/22/2019    Procedure: CV RIGHT HEART CATH;  Surgeon: Yves Rodrigues MD;  [facility]    CV RIGHT HEART CATH MEASUREMENTS RECORDED N/A 08/13/2019    Procedure: CV RIGHT HEART CATH;  Surgeon: Jackson Patten MD;  [facility]    CV RIGHT HEART CATH MEASUREMENTS RECORDED N/A 10/15/2019    Procedure: CV RIGHT HEART CATH;  Surgeon: Santino Mckeon MD;  [facility]    CV RIGHT HEART CATH MEASUREMENTS RECORDED N/A 02/12/2020    Procedure: CV RIGHT HEART CATH;  Surgeon: Isiah Mcallister MD;  [facility]    CV RIGHT HEART CATH MEASUREMENTS RECORDED N/A 03/17/2021    Procedure: CV RIGHT HEART CATH;  Surgeon: Santino Mckeon MD;  [facility]    CV RIGHT HEART CATH MEASUREMENTS RECORDED N/A 2/23/2022    Procedure: CV RIGHT HEART CATH;  Surgeon: Yves Rodrigues MD;  Location:  HEART CARDIAC CATH LAB    CV RIGHT HEART CATH MEASUREMENTS RECORDED N/A 5/10/2022    Procedure: Right Heart Catheterization;  Surgeon: Yves Rodrigues MD;   Location:  HEART CARDIAC CATH LAB    CV RIGHT HEART CATH MEASUREMENTS RECORDED N/A 2/23/2023    Procedure: Right Heart Catheterization;  Surgeon: Santino Mckeon MD;  Location:  HEART CARDIAC CATH LAB    ESOPHAGOSCOPY, GASTROSCOPY, DUODENOSCOPY (EGD), COMBINED N/A 12/19/2017    Procedure: ESOPHAGOGASTRODUODENOSCOPY (EGD) with biopsies;  Surgeon: Gael Romero MD;  Location: [facility];  Service:     ESOPHAGOSCOPY, GASTROSCOPY, DUODENOSCOPY (EGD), COMBINED N/A 3/8/2022    Procedure: ESOPHAGOGASTRODUODENOSCOPY, WITH BIOPSY;  Surgeon: Paddy Saldivar DO;  Location:  GI    H STATISTIC PICC LINE INSERTION >5YR, FAILED Bilateral 10/28/2021    L sided SVC    HAND SURGERY Left     HC REVISE MEDIAN N/CARPAL TUNNEL SURG  09/21/2016    Procedure: OPEN RIGHT CARPAL TUNNEL RELEASE CORTISONE INJECTION RIGHT THUMB;  Surgeon: Duong Santoyo MD;  Location: [facility] OR;  Service: Orthopedics    INCISION AND DRAINAGE CHEST WASHOUT, COMBINED N/A 03/21/2019    Procedure: Incision And Drainage; Evacuation Right Chest Wall Hematoma;  Surgeon: Dewayne House MD;  [facility]    INSERT INTRAAORTIC BALLOON PUMP Left 02/19/2019    Procedure: Insert left  Subclavian Balloon Pump,  Removal Right femoral arterial balloom pump sheath;  Surgeon: Dewayne House MD;  [facility]    INSERT INTRAAORTIC BALLOON PUMP Left 02/21/2019    Procedure: SUBCLAVIAN BALLOON PUMP PLACEMENT;  Surgeon: Ben White MD;  [facility]    INSERT INTRACORONARY STENT      x2    IR PICC PLACEMENT > 5 YRS OF AGE  05/08/2019    IR PICC PLACEMENT > 5 YRS OF AGE  9/21/2024    TRANSPLANT HEART RECIPIENT N/A 02/24/2019    Procedure: TRANSPLANT HEART RECIPIENT;  Surgeon: Dewayne House MD;  Location:  OR     Immunizations:   Immunization History   Administered Date(s) Administered    COVID-19 12+ (Pfizer) 09/17/2023, 05/12/2024, 09/08/2024    COVID-19 Bivalent 12+ (Pfizer) 11/05/2022, 05/07/2023    COVID-19  "MONOVALENT 12+ (Pfizer) 03/19/2021, 04/09/2021, 08/21/2021    COVID-19 Monovalent 12+ (Pfizer 2022) 04/02/2022    Influenza (IIV3) PF 10/22/2008, 09/25/2009, 11/07/2011, 11/30/2012, 10/27/2013, 10/02/2014    Influenza Vaccine >6 months,quad, PF 10/02/2015, 09/20/2019, 09/16/2020, 09/28/2021, 11/12/2022    Influenza Vaccine, 6+MO IM (QUADRIVALENT W/PRESERVATIVES) 09/12/2016, 10/16/2017, 09/20/2018, 09/20/2019    Influenza,INJ,MDCK,PF,Quad >6mo(Flucelvax) 09/17/2023    OPV, trivalent, live 10/20/1978    Pneumo Conj 13-V (2010&after) 09/26/2018    Pneumococcal 23 valent 09/28/2019    TDAP Vaccine (Boostrix) 07/20/2012    Td (Adult), Adsorbed 10/20/1978       ROS A comprehensive review of systems was performed and was otherwise negative    Medications, allergies, and problem list were reviewed and updated    Exam  /76 (BP Location: Right arm, Patient Position: Sitting)   Pulse 85   Temp 97.9  F (36.6  C)   Resp 16   Ht 1.727 m (5' 8\")   Wt 86.2 kg (190 lb)   SpO2 98%   BMI 28.89 kg/m    Patient does not appear acutely ill.  No jaundice.  Lungs clear.  Abdomen is soft with just very minimal left lower quadrant tenderness to deep palpation.  No guarding.  No edema.    Assessment/Plan  1. Diarrhea, unspecified type  I suspect this is antibiotic associated diarrhea, worse in the last week as he advanced his diet and was likely malabsorption some sugars, he was eating ice cream and fruit cups at the time.    Diarrhea has already resolved with Imodium last night.  Has not needed Imodium today.    I do not suspect this is recurrent ulcerative colitis.  It should continue to improve over the next week.  If it is still having severe watery diarrhea or abdominal pain or if he develops bloody diarrhea, then refer to gastroenterology and consider colonoscopy at that time.  Could consider starting empirically older mesalamine if needed.    See patient instructions for plan.    2. History of ulcerative colitis  Not " suspecting recurrence    3. History of pneumonia  Resolved.    4. Mild intermittent asthma without complication  No flare.  No wheezing today    5. Essential hypertension  Controlled.  Moderate chronic renal insufficiency was stable on recent check    6. Chronic renal insufficiency, stage 3 (moderate) (H)  As above    Cardiac transplantation, immunosuppression will be adjusted by transplant team    History of hypothyroid, with normal TSH in February.  That has been stable for years.      Return in about 6 months (around 4/15/2025) for Yearly physical/adult wellness visit.   Patient Instructions   If your diarrhea is related to the recent antibiotics, and restarting your diet last week with perhaps too many undigested sugars, then your diarrhea should be improving over the next week.    You can use Imodium as needed to control loose stools for work, but avoid using too much Imodium.  Do not take Imodium if you are not having diarrhea.    Continue yogurt daily.    Avoid eating sugars that might be undigested, such as fruit, milk or ice cream.    If you continue to have more than 5 watery bowel movements a day, I would have you retested for C. difficile with a stool sample.    If you continue to have diarrhea into next week, make an appoint with gastroenterology to consider further workup for an ulcerative colitis flare.  You could restart mesalamine or Lialda if your diarrhea is not getting better, even prior to proceeding with further GI workup.    Your immunosuppression will be managed through your transplant clinic.    Get your flu shot later this fall.    I will see you in the spring for your adult wellness visit/physical    Fredrick Wolff MD, MD        Current Outpatient Medications   Medication Sig Dispense Refill    acetaminophen (TYLENOL) 325 MG tablet Take 2 tablets (650 mg) by mouth every 4 hours as needed for mild pain      albuterol (PROAIR HFA/PROVENTIL HFA/VENTOLIN HFA) 108 (90 Base) MCG/ACT inhaler  Inhale 2 puffs into the lungs every 6 hours as needed for shortness of breath or wheezing 18 g 5    amLODIPine (NORVASC) 5 MG tablet Take 2 tablets (10 mg) by mouth daily 180 tablet 11    aspirin (ASA) 81 MG chewable tablet Take 1 tablet (81 mg) by mouth daily      azaTHIOprine 75 MG TABS Take 75 mg by mouth daily. PLEASE CONFIRM THE DOSE WITH YOUR TRANSPLANT TEAM      calcium carbonate (OS-RADAMES) 500 MG tablet Take 1 tablet by mouth daily.      cetirizine (ZYRTEC) 10 MG tablet Take 10 mg by mouth daily      clindamycin (CLINDAMAX) 1 % external gel Apply twice daily as needed for acne on the spots 60 g 11    DULoxetine (CYMBALTA) 20 MG capsule Take 1 capsule (20 mg) by mouth daily 90 capsule 2    ferrous sulfate (FE TABS) 325 (65 Fe) MG EC tablet 325 mg.      fluticasone (FLOVENT HFA) 220 MCG/ACT inhaler INHALE 2 PUFFS INTO THE LUNGS TWICE A DAY 12 g 3    Fluticasone Propionate, Inhal, (FLUTICASONE PROPIONATE DISKUS) 100 MCG/ACT AEPB INHALE 1 PUFF INTO THE LUNGS EVERY 12 HOURS. 60 each 3    levothyroxine (SYNTHROID/LEVOTHROID) 75 MCG tablet Take 1 tablet (75 mcg) by mouth daily 90 tablet 3    losartan (COZAAR) 25 MG tablet Take 3 tablets (75 mg) by mouth daily 270 tablet 3    melatonin 3 MG tablet Take 3 mg by mouth nightly as needed for sleep.      mometasone furoate (ASMANEX HFA) 100 MCG/ACT inhaler INHALE 2 PUFFS INTO THE LUNGS TWICE A DAY 13 g 3    multivitamin w/minerals (THERA-VIT-M) tablet Take 1 tablet by mouth daily      omeprazole (PRILOSEC) 40 MG DR capsule TAKE 1 CAPSULE BY MOUTH EVERY DAY 30 capsule 11    RESTASIS 0.05 % ophthalmic emulsion Place 1 drop into both eyes 2 times daily as needed for dry eyes.      rosuvastatin (CRESTOR) 40 MG tablet Take 1 tablet (40 mg) by mouth daily 90 tablet 3    sildenafil (VIAGRA) 50 MG tablet Take 1 tablet (50 mg) by mouth daily as needed (Erectile dysfunction) 10 tablet 11    sirolimus (GENERIC EQUIVALENT) 1 MG tablet Take 1 tablet (1 mg) by mouth daily. 90 tablet 3  "   tadalafil (CIALIS) 10 MG tablet Take 1 tablet (10 mg) by mouth daily as needed (Erectile dysfunction) 10 tablet 3     Allergies   Allergen Reactions    Hydromorphone Other (See Comments)     Significant Delirium    Adhesive Tape Blisters     Tegaderm causes bruises, blisters and extreme irritation.    Lisinopril Other (See Comments)     hypotension    Quinolones Other (See Comments)     Would not rx quinolones until QTc interval improved.     Tobramycin Other (See Comments)     Would not use aminoglycosides as his kidney function is very borderline    Codeine Nausea     Social History     Tobacco Use    Smoking status: Former     Current packs/day: 0.00     Average packs/day: 1 pack/day for 28.8 years (28.8 ttl pk-yrs)     Types: Cigarettes     Start date: 1/1/1980     Quit date: 11/6/2008     Years since quitting: 15.9     Passive exposure: Past    Smokeless tobacco: Never   Vaping Use    Vaping status: Never Used   Substance Use Topics    Alcohol use: Yes     Alcohol/week: 1.0 - 2.0 standard drink of alcohol     Types: 1 - 2 Cans of beer per week    Drug use: No             Subjective   Everton is a 59 year old, presenting for the following health issues:  Abdominal Pain (Diarrhea for 1 month)      10/15/2024     2:46 PM   Additional Questions   Roomed by Airam IVY     History of Present Illness       Reason for visit:  Abdominal    He eats 2-3 servings of fruits and vegetables daily.He consumes 0 sweetened beverage(s) daily.He exercises with enough effort to increase his heart rate 20 to 29 minutes per day.  He exercises with enough effort to increase his heart rate 5 days per week.   He is taking medications regularly.                     Objective    /76 (BP Location: Right arm, Patient Position: Sitting)   Pulse 85   Temp 97.9  F (36.6  C)   Resp 16   Ht 1.727 m (5' 8\")   Wt 86.2 kg (190 lb)   SpO2 98%   BMI 28.89 kg/m    Body mass index is 28.89 kg/m .  Physical Exam               Signed " Electronically by: Fredrick Wolff MD

## 2024-10-15 NOTE — PATIENT INSTRUCTIONS
If your diarrhea is related to the recent antibiotics, and restarting your diet last week with perhaps too many undigested sugars, then your diarrhea should be improving over the next week.    You can use Imodium as needed to control loose stools for work, but avoid using too much Imodium.  Do not take Imodium if you are not having diarrhea.    Continue yogurt daily.    Avoid eating sugars that might be undigested, such as fruit, milk or ice cream.    If you continue to have more than 5 watery bowel movements a day, I would have you retested for C. difficile with a stool sample.    If you continue to have diarrhea into next week, make an appoint with gastroenterology to consider further workup for an ulcerative colitis flare.  You could restart mesalamine or Lialda if your diarrhea is not getting better, even prior to proceeding with further GI workup.    Your immunosuppression will be managed through your transplant clinic.    Get your flu shot later this fall.    I will see you in the spring for your adult wellness visit/physical

## 2024-10-24 ENCOUNTER — TELEPHONE (OUTPATIENT)
Dept: TRANSPLANT | Facility: CLINIC | Age: 59
End: 2024-10-24

## 2024-10-24 DIAGNOSIS — Z94.1 TRANSPLANTED HEART (H): ICD-10-CM

## 2024-10-24 DIAGNOSIS — D72.9 ABNORMAL WHITE BLOOD CELL (WBC) COUNT: Primary | ICD-10-CM

## 2024-10-24 NOTE — TELEPHONE ENCOUNTER
Pt called in asking for a health psychology referral. He also said he wants to get another rapa level checked to make sure he is within goal. Pt is getting over a head cold. Referral submitted and standing rapa orders in.

## 2024-10-26 ENCOUNTER — HEALTH MAINTENANCE LETTER (OUTPATIENT)
Age: 59
End: 2024-10-26

## 2024-10-28 ENCOUNTER — LAB (OUTPATIENT)
Dept: LAB | Facility: HOSPITAL | Age: 59
End: 2024-10-28

## 2024-10-28 DIAGNOSIS — N25.0 RENAL OSTEODYSTROPHY: ICD-10-CM

## 2024-10-28 DIAGNOSIS — N18.32 CHRONIC KIDNEY DISEASE (CKD) STAGE G3B/A1, MODERATELY DECREASED GLOMERULAR FILTRATION RATE (GFR) BETWEEN 30-44 ML/MIN/1.73 SQUARE METER AND ALBUMINURIA CREATININE RATIO LESS THAN 30 MG/G (H): ICD-10-CM

## 2024-10-28 DIAGNOSIS — N18.9 ANEMIA OF CHRONIC RENAL FAILURE: ICD-10-CM

## 2024-10-28 DIAGNOSIS — I10 ESSENTIAL HYPERTENSION, MALIGNANT: ICD-10-CM

## 2024-10-28 DIAGNOSIS — Z94.1 TRANSPLANTED HEART (H): ICD-10-CM

## 2024-10-28 DIAGNOSIS — D63.1 ANEMIA OF CHRONIC RENAL FAILURE: ICD-10-CM

## 2024-10-28 LAB
ALBUMIN MFR UR ELPH: 194.3 MG/DL
ALBUMIN SERPL BCG-MCNC: 4.2 G/DL (ref 3.5–5.2)
ANION GAP SERPL CALCULATED.3IONS-SCNC: 14 MMOL/L (ref 7–15)
BUN SERPL-MCNC: 36.2 MG/DL (ref 8–23)
CALCIUM SERPL-MCNC: 8.9 MG/DL (ref 8.8–10.4)
CHLORIDE SERPL-SCNC: 102 MMOL/L (ref 98–107)
CREAT SERPL-MCNC: 2.21 MG/DL (ref 0.67–1.17)
CREAT UR-MCNC: 116 MG/DL
EGFRCR SERPLBLD CKD-EPI 2021: 33 ML/MIN/1.73M2
ERYTHROCYTE [DISTWIDTH] IN BLOOD BY AUTOMATED COUNT: 14.9 % (ref 10–15)
GLUCOSE SERPL-MCNC: 81 MG/DL (ref 70–99)
HCO3 SERPL-SCNC: 22 MMOL/L (ref 22–29)
HCT VFR BLD AUTO: 32.4 % (ref 40–53)
HGB BLD-MCNC: 10.4 G/DL (ref 13.3–17.7)
IRON BINDING CAPACITY (ROCHE): 253 UG/DL (ref 240–430)
IRON SATN MFR SERPL: 28 % (ref 15–46)
IRON SERPL-MCNC: 70 UG/DL (ref 61–157)
MCH RBC QN AUTO: 27.9 PG (ref 26.5–33)
MCHC RBC AUTO-ENTMCNC: 32.1 G/DL (ref 31.5–36.5)
MCV RBC AUTO: 87 FL (ref 78–100)
PHOSPHATE SERPL-MCNC: 3.8 MG/DL (ref 2.5–4.5)
PLATELET # BLD AUTO: 222 10E3/UL (ref 150–450)
POTASSIUM SERPL-SCNC: 4.8 MMOL/L (ref 3.4–5.3)
PROT/CREAT 24H UR: 1.68 MG/MG CR (ref 0–0.2)
RBC # BLD AUTO: 3.73 10E6/UL (ref 4.4–5.9)
SIROLIMUS BLD-MCNC: 3.5 UG/L (ref 5–15)
SODIUM SERPL-SCNC: 138 MMOL/L (ref 135–145)
TME LAST DOSE: ABNORMAL H
TME LAST DOSE: ABNORMAL H
WBC # BLD AUTO: 4.8 10E3/UL (ref 4–11)

## 2024-10-28 PROCEDURE — 83550 IRON BINDING TEST: CPT

## 2024-10-28 PROCEDURE — 36415 COLL VENOUS BLD VENIPUNCTURE: CPT

## 2024-10-28 PROCEDURE — 80195 ASSAY OF SIROLIMUS: CPT

## 2024-10-28 PROCEDURE — 85027 COMPLETE CBC AUTOMATED: CPT

## 2024-10-28 PROCEDURE — 84156 ASSAY OF PROTEIN URINE: CPT

## 2024-10-28 PROCEDURE — 84132 ASSAY OF SERUM POTASSIUM: CPT

## 2024-10-28 PROCEDURE — 83970 ASSAY OF PARATHORMONE: CPT

## 2024-10-28 PROCEDURE — 82728 ASSAY OF FERRITIN: CPT

## 2024-10-29 DIAGNOSIS — D72.9 ABNORMAL WHITE BLOOD CELL (WBC) COUNT: Primary | ICD-10-CM

## 2024-10-29 DIAGNOSIS — Z94.1 TRANSPLANTED HEART (H): ICD-10-CM

## 2024-10-29 LAB
FERRITIN SERPL-MCNC: 142 NG/ML (ref 31–409)
PTH-INTACT SERPL-MCNC: 159 PG/ML (ref 15–65)

## 2024-10-29 RX ORDER — SIROLIMUS 1 MG/1
1 TABLET, FILM COATED ORAL DAILY
Qty: 90 TABLET | Refills: 3 | Status: SHIPPED | OUTPATIENT
Start: 2024-10-29

## 2024-10-29 RX ORDER — SIROLIMUS 0.5 MG/1
0.5 TABLET, FILM COATED ORAL DAILY
Qty: 90 TABLET | Refills: 3 | Status: SHIPPED | OUTPATIENT
Start: 2024-10-29

## 2024-10-29 ASSESSMENT — ENCOUNTER SYMPTOMS: NEW SYMPTOMS OF CORONARY ARTERY DISEASE: 0

## 2024-11-05 DIAGNOSIS — E03.2 HYPOTHYROIDISM DUE TO MEDICATION: Chronic | ICD-10-CM

## 2024-11-05 RX ORDER — LEVOTHYROXINE SODIUM 75 UG/1
75 TABLET ORAL DAILY
Qty: 90 TABLET | Refills: 2 | Status: SHIPPED | OUTPATIENT
Start: 2024-11-05

## 2024-11-18 ENCOUNTER — LAB (OUTPATIENT)
Dept: LAB | Facility: HOSPITAL | Age: 59
End: 2024-11-18

## 2024-11-18 DIAGNOSIS — Z94.1 TRANSPLANTED HEART (H): ICD-10-CM

## 2024-11-18 LAB
ANION GAP SERPL CALCULATED.3IONS-SCNC: 13 MMOL/L (ref 7–15)
BUN SERPL-MCNC: 35.2 MG/DL (ref 8–23)
CALCIUM SERPL-MCNC: 8.7 MG/DL (ref 8.8–10.4)
CHLORIDE SERPL-SCNC: 104 MMOL/L (ref 98–107)
CREAT SERPL-MCNC: 2.21 MG/DL (ref 0.67–1.17)
EGFRCR SERPLBLD CKD-EPI 2021: 33 ML/MIN/1.73M2
ERYTHROCYTE [DISTWIDTH] IN BLOOD BY AUTOMATED COUNT: 14.8 % (ref 10–15)
GLUCOSE SERPL-MCNC: 91 MG/DL (ref 70–99)
HCO3 SERPL-SCNC: 22 MMOL/L (ref 22–29)
HCT VFR BLD AUTO: 35.6 % (ref 40–53)
HGB BLD-MCNC: 11.4 G/DL (ref 13.3–17.7)
MCH RBC QN AUTO: 27.9 PG (ref 26.5–33)
MCHC RBC AUTO-ENTMCNC: 32 G/DL (ref 31.5–36.5)
MCV RBC AUTO: 87 FL (ref 78–100)
PLATELET # BLD AUTO: 168 10E3/UL (ref 150–450)
POTASSIUM SERPL-SCNC: 4.5 MMOL/L (ref 3.4–5.3)
RBC # BLD AUTO: 4.09 10E6/UL (ref 4.4–5.9)
SIROLIMUS BLD-MCNC: 5.6 UG/L (ref 5–15)
SODIUM SERPL-SCNC: 139 MMOL/L (ref 135–145)
TME LAST DOSE: NORMAL H
TME LAST DOSE: NORMAL H
WBC # BLD AUTO: 4.4 10E3/UL (ref 4–11)

## 2024-11-18 PROCEDURE — 85018 HEMOGLOBIN: CPT

## 2024-11-18 PROCEDURE — 80195 ASSAY OF SIROLIMUS: CPT

## 2024-11-18 PROCEDURE — 85048 AUTOMATED LEUKOCYTE COUNT: CPT

## 2024-11-18 PROCEDURE — 82310 ASSAY OF CALCIUM: CPT

## 2024-11-18 PROCEDURE — 80048 BASIC METABOLIC PNL TOTAL CA: CPT

## 2024-11-18 PROCEDURE — 36415 COLL VENOUS BLD VENIPUNCTURE: CPT

## 2024-12-03 ENCOUNTER — TRANSFERRED RECORDS (OUTPATIENT)
Dept: MULTI SPECIALTY CLINIC | Facility: CLINIC | Age: 59
End: 2024-12-03

## 2024-12-03 LAB — RETINOPATHY: NORMAL

## 2024-12-12 DIAGNOSIS — R11.0 NAUSEA: ICD-10-CM

## 2024-12-12 RX ORDER — OMEPRAZOLE 40 MG/1
40 CAPSULE, DELAYED RELEASE ORAL DAILY
Qty: 90 CAPSULE | Refills: 4 | Status: SHIPPED | OUTPATIENT
Start: 2024-12-12

## 2024-12-17 DIAGNOSIS — Z94.1 TRANSPLANTED HEART (H): ICD-10-CM

## 2024-12-17 DIAGNOSIS — D72.9 ABNORMAL WHITE BLOOD CELL (WBC) COUNT: Primary | ICD-10-CM

## 2024-12-17 RX ORDER — AZATHIOPRINE 75 MG/1
75 TABLET ORAL DAILY
Qty: 90 TABLET | Refills: 3 | Status: SHIPPED | OUTPATIENT
Start: 2024-12-17

## 2024-12-28 ENCOUNTER — HEALTH MAINTENANCE LETTER (OUTPATIENT)
Age: 59
End: 2024-12-28

## 2024-12-30 DIAGNOSIS — J45.20 INTERMITTENT ASTHMA WITHOUT COMPLICATION, UNSPECIFIED ASTHMA SEVERITY: ICD-10-CM

## 2024-12-30 RX ORDER — ALBUTEROL SULFATE 90 UG/1
2 INHALANT RESPIRATORY (INHALATION) EVERY 6 HOURS PRN
Qty: 8.5 G | Refills: 5 | Status: SHIPPED | OUTPATIENT
Start: 2024-12-30

## 2024-12-31 DIAGNOSIS — I10 BENIGN ESSENTIAL HYPERTENSION: ICD-10-CM

## 2024-12-31 DIAGNOSIS — D72.9 ABNORMAL WHITE BLOOD CELL (WBC) COUNT: Primary | ICD-10-CM

## 2025-01-07 RX ORDER — AMLODIPINE BESYLATE 5 MG/1
10 TABLET ORAL DAILY
Qty: 180 TABLET | Refills: 11 | Status: SHIPPED | OUTPATIENT
Start: 2025-01-07

## 2025-01-14 DIAGNOSIS — D72.9 ABNORMAL WHITE BLOOD CELL (WBC) COUNT: Primary | ICD-10-CM

## 2025-01-14 DIAGNOSIS — Z94.1 TRANSPLANTED HEART (H): ICD-10-CM

## 2025-01-15 RX ORDER — LOSARTAN POTASSIUM 25 MG/1
75 TABLET ORAL DAILY
Qty: 270 TABLET | Refills: 3 | Status: SHIPPED | OUTPATIENT
Start: 2025-01-15

## 2025-01-22 ENCOUNTER — LAB REQUISITION (OUTPATIENT)
Dept: LAB | Facility: HOSPITAL | Age: 60
End: 2025-01-22
Payer: COMMERCIAL

## 2025-01-22 DIAGNOSIS — I13.10 HYPERTENSIVE HEART AND CHRONIC KIDNEY DISEASE WITHOUT HEART FAILURE, WITH STAGE 1 THROUGH STAGE 4 CHRONIC KIDNEY DISEASE, OR UNSPECIFIED CHRONIC KIDNEY DISEASE: ICD-10-CM

## 2025-01-22 DIAGNOSIS — N25.0 RENAL OSTEODYSTROPHY: ICD-10-CM

## 2025-01-22 DIAGNOSIS — D63.1 ANEMIA IN CHRONIC KIDNEY DISEASE (CODE): ICD-10-CM

## 2025-01-22 DIAGNOSIS — T86.20: ICD-10-CM

## 2025-01-22 DIAGNOSIS — N18.32 CHRONIC KIDNEY DISEASE, STAGE 3B (H): ICD-10-CM

## 2025-01-22 DIAGNOSIS — D84.9 IMMUNODEFICIENCY, UNSPECIFIED: ICD-10-CM

## 2025-01-22 LAB
ALBUMIN MFR UR ELPH: 185.8 MG/DL
ALBUMIN SERPL BCG-MCNC: 4.1 G/DL (ref 3.5–5.2)
ANION GAP SERPL CALCULATED.3IONS-SCNC: 13 MMOL/L (ref 7–15)
BUN SERPL-MCNC: 41.9 MG/DL (ref 8–23)
CALCIUM SERPL-MCNC: 9.3 MG/DL (ref 8.8–10.4)
CHLORIDE SERPL-SCNC: 103 MMOL/L (ref 98–107)
CREAT SERPL-MCNC: 2.31 MG/DL (ref 0.67–1.17)
CREAT UR-MCNC: 92.9 MG/DL
EGFRCR SERPLBLD CKD-EPI 2021: 31 ML/MIN/1.73M2
ERYTHROCYTE [DISTWIDTH] IN BLOOD BY AUTOMATED COUNT: 14.4 % (ref 10–15)
GLUCOSE SERPL-MCNC: 84 MG/DL (ref 70–99)
HCO3 SERPL-SCNC: 24 MMOL/L (ref 22–29)
HCT VFR BLD AUTO: 37.4 % (ref 40–53)
HGB BLD-MCNC: 12.2 G/DL (ref 13.3–17.7)
IRON BINDING CAPACITY (ROCHE): 275 UG/DL (ref 240–430)
IRON SATN MFR SERPL: 25 % (ref 15–46)
IRON SERPL-MCNC: 68 UG/DL (ref 61–157)
MAGNESIUM SERPL-MCNC: 2.5 MG/DL (ref 1.7–2.3)
MCH RBC QN AUTO: 27.8 PG (ref 26.5–33)
MCHC RBC AUTO-ENTMCNC: 32.6 G/DL (ref 31.5–36.5)
MCV RBC AUTO: 85 FL (ref 78–100)
PHOSPHATE SERPL-MCNC: 4.3 MG/DL (ref 2.5–4.5)
PLATELET # BLD AUTO: 207 10E3/UL (ref 150–450)
POTASSIUM SERPL-SCNC: 4.7 MMOL/L (ref 3.4–5.3)
PROT/CREAT 24H UR: 2 MG/MG CR (ref 0–0.2)
PTH-INTACT SERPL-MCNC: 158 PG/ML (ref 15–65)
RBC # BLD AUTO: 4.39 10E6/UL (ref 4.4–5.9)
SODIUM SERPL-SCNC: 140 MMOL/L (ref 135–145)
WBC # BLD AUTO: 4.7 10E3/UL (ref 4–11)

## 2025-01-22 PROCEDURE — 83735 ASSAY OF MAGNESIUM: CPT | Mod: ORL | Performed by: INTERNAL MEDICINE

## 2025-01-22 PROCEDURE — 80069 RENAL FUNCTION PANEL: CPT | Mod: ORL | Performed by: INTERNAL MEDICINE

## 2025-01-22 PROCEDURE — 83550 IRON BINDING TEST: CPT | Mod: ORL | Performed by: INTERNAL MEDICINE

## 2025-01-22 PROCEDURE — 82306 VITAMIN D 25 HYDROXY: CPT | Mod: ORL | Performed by: INTERNAL MEDICINE

## 2025-01-22 PROCEDURE — 36415 COLL VENOUS BLD VENIPUNCTURE: CPT | Mod: ORL | Performed by: INTERNAL MEDICINE

## 2025-01-22 PROCEDURE — 84156 ASSAY OF PROTEIN URINE: CPT | Mod: ORL | Performed by: INTERNAL MEDICINE

## 2025-01-22 PROCEDURE — 83970 ASSAY OF PARATHORMONE: CPT | Mod: ORL | Performed by: INTERNAL MEDICINE

## 2025-01-22 PROCEDURE — 85027 COMPLETE CBC AUTOMATED: CPT | Mod: ORL | Performed by: INTERNAL MEDICINE

## 2025-01-22 PROCEDURE — 82728 ASSAY OF FERRITIN: CPT | Mod: ORL | Performed by: INTERNAL MEDICINE

## 2025-01-23 LAB
FERRITIN SERPL-MCNC: 42 NG/ML (ref 31–409)
VIT D+METAB SERPL-MCNC: 40 NG/ML (ref 20–50)

## 2025-01-25 ASSESSMENT — ASTHMA QUESTIONNAIRES
QUESTION_5 LAST FOUR WEEKS HOW WOULD YOU RATE YOUR ASTHMA CONTROL: WELL CONTROLLED
QUESTION_2 LAST FOUR WEEKS HOW OFTEN HAVE YOU HAD SHORTNESS OF BREATH: ONCE OR TWICE A WEEK
QUESTION_3 LAST FOUR WEEKS HOW OFTEN DID YOUR ASTHMA SYMPTOMS (WHEEZING, COUGHING, SHORTNESS OF BREATH, CHEST TIGHTNESS OR PAIN) WAKE YOU UP AT NIGHT OR EARLIER THAN USUAL IN THE MORNING: NOT AT ALL
QUESTION_1 LAST FOUR WEEKS HOW MUCH OF THE TIME DID YOUR ASTHMA KEEP YOU FROM GETTING AS MUCH DONE AT WORK, SCHOOL OR AT HOME: NONE OF THE TIME
ACT_TOTALSCORE: 22
ACT_TOTALSCORE: 22
QUESTION_4 LAST FOUR WEEKS HOW OFTEN HAVE YOU USED YOUR RESCUE INHALER OR NEBULIZER MEDICATION (SUCH AS ALBUTEROL): ONCE A WEEK OR LESS

## 2025-01-27 ENCOUNTER — OFFICE VISIT (OUTPATIENT)
Dept: INTERNAL MEDICINE | Facility: CLINIC | Age: 60
End: 2025-01-27
Payer: COMMERCIAL

## 2025-01-27 VITALS
TEMPERATURE: 97.5 F | DIASTOLIC BLOOD PRESSURE: 88 MMHG | BODY MASS INDEX: 30.31 KG/M2 | HEIGHT: 68 IN | RESPIRATION RATE: 16 BRPM | HEART RATE: 109 BPM | WEIGHT: 200 LBS | OXYGEN SATURATION: 97 % | SYSTOLIC BLOOD PRESSURE: 148 MMHG

## 2025-01-27 DIAGNOSIS — H73.012 BULLOUS MYRINGITIS OF LEFT EAR: Primary | ICD-10-CM

## 2025-01-27 DIAGNOSIS — Z94.1 HEART REPLACED BY TRANSPLANT (H): ICD-10-CM

## 2025-01-27 PROCEDURE — 99214 OFFICE O/P EST MOD 30 MIN: CPT | Performed by: INTERNAL MEDICINE

## 2025-01-27 RX ORDER — FLUTICASONE PROPIONATE 50 MCG
2 SPRAY, SUSPENSION (ML) NASAL DAILY
COMMUNITY
Start: 2024-10-16

## 2025-01-27 RX ORDER — AZITHROMYCIN 250 MG/1
TABLET, FILM COATED ORAL
Qty: 6 TABLET | Refills: 0 | Status: SHIPPED | OUTPATIENT
Start: 2025-01-27 | End: 2025-02-01

## 2025-01-27 ASSESSMENT — PATIENT HEALTH QUESTIONNAIRE - PHQ9
SUM OF ALL RESPONSES TO PHQ QUESTIONS 1-9: 0
SUM OF ALL RESPONSES TO PHQ QUESTIONS 1-9: 0
10. IF YOU CHECKED OFF ANY PROBLEMS, HOW DIFFICULT HAVE THESE PROBLEMS MADE IT FOR YOU TO DO YOUR WORK, TAKE CARE OF THINGS AT HOME, OR GET ALONG WITH OTHER PEOPLE: NOT DIFFICULT AT ALL

## 2025-01-27 NOTE — PATIENT INSTRUCTIONS
You have the appearance of bubbles in your left eardrum.  This may be from air bubbles behind your eardrum from your nasal a week and a half ago.    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  In case it is an infection, you have been given azithromycin antibiotic to take for 5 days.  Antibiotics can cause diarrhea and antibiotics can affect levels of your immunosuppression drugs.    Do not do any more nasal lavage.    If you have continued symptoms I would recommend further evaluation at the ENT clinic.

## 2025-01-27 NOTE — PROGRESS NOTES
Everton GONZALES Ric   59 year old male    Date of Visit: 2025    Chief Complaint   Patient presents with    Ear Problem     Lt ear pain, feels clogged and muffled and has been using drops x 1 week     Subjective  59-year-old male cardiac transplant patient on dialysis.  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  Moderate chronic renal sufficiency and hypertension stable, controlled.    Patient developed a cold at New Year's with a cough and nasal congestion, not high fever.  Did not treat with antibiotics or seek medical attention.  He is much better from that now with essentially resolved cough.  But he had some nasal congestion.  He was doing some nasal lavage treatment about a week and a half ago and had significant increased left ear discomfort and decreased hearing after the lavage and he stopped doing that.    The decreased hearing and mild discomfort is remained about the same in the left ear, not severe.  No purulent drainage.  He denies sinus pain or purulent drainage from the nose.  No fevers.  No cough.    Right ear is normal    PMHx:    Past Medical History:   Diagnosis Date    Alcohol abuse     Arthritis     hands, neck    ASD (atrial septal defect)     s/p repair age 3    Asthma     Atrial fibrillation (H)     Pierce's esophagus 10/4/2018    Pierce's esophagus     Cataract     CHF (congestive heart failure) (H)     Chronic rhinitis 10/4/2018    Chronic systolic heart failure (H) 10/4/2018    Clotting disorder     Congenital cardiomyopathy in  (H)     Depression 10/4/2018    Depression     Diastolic dysfunction     DJD (degenerative joint disease)     neck    DJD (degenerative joint disease) - neck 10/4/2018    H/O congenital atrial septal defect (ASD) repair at age 3 10/4/2018    History of anesthesia complications     nausea    History of transfusion     Hypothyroidism     Hypothyroidism due to medication 10/4/2018    ICD (implantable cardioverter-defibrillator) in place      ICD, [employer] 2008; gen change 2/2018 10/4/2018    Intermittent asthma without complication 10/4/2018    Nonischemic cardiomyopathy (H) 10/4/2018    On amiodarone therapy 10/4/2018    Pacemaker     Paroxysmal atrial fibrillation (H) 10/4/2018    S/P ablation of atrial fibrillation 10/4/2018    Systolic heart failure (H)     Ulcerative colitis (H) 2005    Ulcerative colitis (H)     Ventricular tachycardia (H) 10/4/2018    Ventricular tachycardia (H)      PSHx:    Past Surgical History:   Procedure Laterality Date    ABLATION OF DYSRHYTHMIC FOCUS      for A fib    AICD, DUAL CHAMBER      ASD REPAIR  01/01/1968    BRONCHOSCOPY (RIGID OR FLEXIBLE), DIAGNOSTIC N/A 04/30/2019    Procedure: BRONCHOSCOPY, WITH BAL;  Surgeon: Margot Chiang MD;  Location:  GI    CARDIAC DEFIBRILLATOR PLACEMENT      x2--last was Feb 2018    COLONOSCOPY N/A 02/20/2020    Procedure: COLONOSCOPY, WITH POLYPECTOMY AND BIOPSY;  Surgeon: Ranjeet Cooper MD;  Location:  GI    COLONOSCOPY N/A 02/05/2015    Procedure: COLONOSCOPY with biopsy;  Surgeon: Fernie Akers MD;  Location: [facility];  Service:     COLONOSCOPY N/A 12/19/2017    Procedure: COLONOSCOPY with biopsies and polypectomies using snare;  Surgeon: aGel Romero MD;  Location: [facility] GI;  Service:     COLONOSCOPY N/A 3/8/2022    Procedure: COLONOSCOPY, WITH POLYPECTOMY AND BIOPSY;  Surgeon: Paddy Saldivar DO;  Location:  GI    CV CORONARY ANGIOGRAM N/A 02/12/2020    Procedure: CV CORONARY ANGIOGRAM;  Surgeon: Isiah Mcallister MD;  Location:  HEART CARDIAC CATH LAB    CV CORONARY ANGIOGRAM N/A 03/17/2021    Procedure: CV CORONARY ANGIOGRAM;  Surgeon: Santino Mckeon MD;  Location:  HEART CARDIAC CATH LAB    CV CORONARY ANGIOGRAM N/A 2/23/2022    Procedure: CV CORONARY ANGIOGRAM;  Surgeon: Yves Rodrigues MD;  Location: [facility] CARDIAC CATH LAB    CV CORONARY ANGIOGRAM N/A 2/23/2023    Procedure: Coronary Angiogram;  Surgeon: Linda  Santino MCKEON MD;  [facility]    CV HEART BIOPSY N/A 03/04/2019    Procedure: Heart Biopsy;  Surgeon: Abhijeet Titus MD;  [facility]    CV HEART BIOPSY N/A 03/25/2019    Procedure: Heart Biopsy;  Surgeon: Yves Rodrigues MD;  [facility]    CV HEART BIOPSY N/A 03/28/2019    Procedure: Heart Cath Heart Biopsy;  Surgeon: Yves Rodrigues MD;  [facility]    CV HEART BIOPSY N/A 04/10/2019    Procedure: CV HEART BIOPSY;  Surgeon: Isiah Mcallister MD;  [facility]    CV HEART BIOPSY N/A 04/24/2019    Procedure: CV HEART BIOPSY;  Surgeon: Isiah Mcallister MD;  [facility]    CV HEART BIOPSY N/A 05/08/2019    Procedure: CV HEART BIOPSY;  Surgeon: Isiah Mcallister MD;  [facility]    CV HEART BIOPSY N/A 06/04/2019    Procedure: CV HEART BIOPSY;  Surgeon: Alton Solomon MD;  [facility]    CV HEART BIOPSY N/A 05/22/2019    Procedure: CV HEART BIOPSY;  Surgeon: Yves Rodrigues MD;  [facility]    CV HEART BIOPSY N/A 07/17/2019    Procedure: CV HEART BIOPSY;  Surgeon: Isiah Mcallister MD;  [facility]    CV HEART BIOPSY N/A 08/13/2019    Procedure: CV HEART BIOPSY;  Surgeon: Jackson Patten MD;  [facility]    CV HEART BIOPSY N/A 10/15/2019    Procedure: CV HEART BIOPSY;  Surgeon: Santino Mckeon MD;  [facility]    CV HEART BIOPSY N/A 02/12/2020    Procedure: CV HEART BIOPSY;  Surgeon: Isiah Mcallister MD;  [facility]    CV HEART BIOPSY N/A 05/05/2020    Procedure: CV HEART BIOPSY;  Surgeon: Yves Rodrigues MD;  [facility]    CV HEART BIOPSY N/A 03/17/2021    Procedure: CV HEART BIOPSY;  Surgeon: Santino Mckeon MD;  Location: [facility]  CARDIAC CATH LAB    CV HEART BIOPSY N/A 5/10/2022    Procedure: Heart Biopsy;  Surgeon: Yves Rodrigues MD;  Location:  HEART CARDIAC CATH LAB    CV INTRA AORTIC BALLOON N/A 02/18/2019    Procedure: Intra-Aortic Balloon;  Surgeon: Alton Solomon MD;  Location:  HEART CARDIAC CATH LAB    CV INTRA AORTIC BALLOON N/A 02/20/2019    Procedure: Replace subclavian IABP;  Surgeon: Yves Rodrigues MD;  Location:  HEART CARDIAC CATH LAB    CV INTRAVASULAR ULTRASOUND N/A 02/12/2020    Procedure: CV INTRAVASCULAR ULTRASOUND;  Surgeon: Isiah Mcallister MD;  Location:  HEART CARDIAC CATH LAB    CV LEFT HEART CATH N/A 03/19/2019    Procedure: Left Heart Cath;  Surgeon: Yves Rodrigues MD;  Location:  HEART CARDIAC CATH LAB    CV LEFT HEART CATH N/A 02/12/2020    Procedure: Left Heart Cath;  Surgeon: Isiah Mcallister MD;  Location:  HEART CARDIAC CATH LAB    CV MYOCARDIAL BIOPSY N/A 03/19/2019    Procedure: RHC/HBx - Femoral access for 3/19 per Nimisha GONZALEZ;  Surgeon: Yves Rodrigues MD;  Location:  HEART CARDIAC CATH LAB    CV MYOCARDIAL BIOPSY N/A 03/11/2019    Procedure: ADD ON RHC/HBX;  Surgeon: Alton Solomon MD;  Location:  HEART CARDIAC CATH LAB    CV RIGHT HEART CATH MEASUREMENTS RECORDED N/A 03/25/2019    Procedure: Right Heart Cath;  Surgeon: Yves Rodrigues MD;  Location:  HEART CARDIAC CATH LAB    CV RIGHT HEART CATH MEASUREMENTS RECORDED N/A 03/28/2019    Procedure: Heart Cath Right Heart Cath;  Surgeon: Yves Rodrigues MD;  Location:  HEART CARDIAC CATH LAB    CV RIGHT HEART CATH MEASUREMENTS RECORDED N/A 04/24/2019    Procedure: CV RIGHT HEART CATH;  Surgeon: Isiah Mcallister MD;  Location:  HEART CARDIAC CATH LAB    CV RIGHT HEART CATH MEASUREMENTS RECORDED N/A 06/04/2019    Procedure: CV RIGHT HEART CATH;  Surgeon: Alton Solomon MD;  Location:  HEART CARDIAC CATH LAB    CV RIGHT HEART CATH MEASUREMENTS RECORDED N/A 05/22/2019     Procedure: CV RIGHT HEART CATH;  Surgeon: Yves Rodrigues MD;  Location:  HEART CARDIAC CATH LAB    CV RIGHT HEART CATH MEASUREMENTS RECORDED N/A 08/13/2019    Procedure: CV RIGHT HEART CATH;  Surgeon: Jackson Patten MD;  Location:  HEART CARDIAC CATH LAB    CV RIGHT HEART CATH MEASUREMENTS RECORDED N/A 10/15/2019    Procedure: CV RIGHT HEART CATH;  Surgeon: Santino Mckeon MD;  Location:  HEART CARDIAC CATH LAB    CV RIGHT HEART CATH MEASUREMENTS RECORDED N/A 02/12/2020    Procedure: CV RIGHT HEART CATH;  Surgeon: Isiah Mcallister MD;  Location:  HEART CARDIAC CATH LAB    CV RIGHT HEART CATH MEASUREMENTS RECORDED N/A 03/17/2021    Procedure: CV RIGHT HEART CATH;  Surgeon: Santino Mkceon MD;  Location:  HEART CARDIAC CATH LAB    CV RIGHT HEART CATH MEASUREMENTS RECORDED N/A 2/23/2022    Procedure: CV RIGHT HEART CATH;  Surgeon: Yves Rodrigues MD;  Location:  HEART CARDIAC CATH LAB    CV RIGHT HEART CATH MEASUREMENTS RECORDED N/A 5/10/2022    Procedure: Right Heart Catheterization;  Surgeon: Yves Rodrigues MD;  Location:  HEART CARDIAC CATH LAB    CV RIGHT HEART CATH MEASUREMENTS RECORDED N/A 2/23/2023    Procedure: Right Heart Catheterization;  Surgeon: Santino Mckeon MD;  Location: [facility] CARDIAC CATH LAB    ESOPHAGOSCOPY, GASTROSCOPY, DUODENOSCOPY (EGD), COMBINED N/A 12/19/2017    Procedure: ESOPHAGOGASTRODUODENOSCOPY (EGD) with biopsies;  Surgeon: Gael Romero MD;  Location: [facility];  Service:     ESOPHAGOSCOPY, GASTROSCOPY, DUODENOSCOPY (EGD), COMBINED N/A 3/8/2022    Procedure: ESOPHAGOGASTRODUODENOSCOPY, WITH BIOPSY;  Surgeon: Paddy Saldivar DO;  Location: [facility] STATISTIC PICC LINE INSERTION >5YR, FAILED Bilateral 10/28/2021    L sided SVC    HAND SURGERY Left     HC REVISE MEDIAN N/CARPAL TUNNEL SURG  09/21/2016    Procedure: OPEN RIGHT CARPAL TUNNEL RELEASE CORTISONE INJECTION RIGHT THUMB;  Surgeon: Duong Santoyo MD;  Location: [facility];  Service: Orthopedics    INCISION AND DRAINAGE CHEST WASHOUT, COMBINED N/A 03/21/2019    Procedure: Incision And Drainage; Evacuation Right Chest Wall Hematoma;  Surgeon: Dewayne House MD;  [facility]    INSERT INTRAAORTIC BALLOON PUMP Left 02/19/2019    Procedure: Insert left  Subclavian Balloon Pump,  Removal Right femoral arterial balloom pump sheath;  Surgeon: Dewayne House MD;  [facility]    INSERT INTRAAORTIC BALLOON PUMP Left 02/21/2019    Procedure: SUBCLAVIAN BALLOON PUMP PLACEMENT;  Surgeon: Ben White MD;  [facility]    INSERT INTRACORONARY STENT      x2    IR PICC PLACEMENT > 5 YRS OF AGE  05/08/2019    IR PICC PLACEMENT > 5 YRS OF AGE  9/21/2024    TRANSPLANT HEART RECIPIENT N/A 02/24/2019    Procedure: TRANSPLANT HEART RECIPIENT;  Surgeon: Dewayne House MD;  [facility]     Immunizations:   Immunization History   Administered Date(s) Administered    COVID-19 12+ (Pfizer) 09/17/2023, 05/12/2024, 09/08/2024    COVID-19 Bivalent 12+ (Pfizer) 11/05/2022, 05/07/2023    COVID-19 MONOVALENT 12+ (Pfizer) 03/19/2021, 04/09/2021, 08/21/2021    COVID-19 Monovalent 12+ (Pfizer 2022) 04/02/2022    INFLUENZA,TRIVALENT (FLUCELVAX) 11/18/2024    Influenza (IIV3) PF 10/22/2008, 09/25/2009, 11/07/2011, 11/30/2012, 10/27/2013, 10/02/2014    Influenza Vaccine >6 months,quad, PF 10/02/2015, 09/20/2019, 09/16/2020, 09/28/2021, 11/12/2022    Influenza Vaccine, 6+MO IM (QUADRIVALENT W/PRESERVATIVES) 09/12/2016, 10/16/2017, 09/20/2018, 09/20/2019    Influenza,INJ,MDCK,PF,Quad >6mo(Flucelvax) 09/17/2023    OPV, trivalent, live 10/20/1978    Pneumo Conj 13-V (2010&after) 09/26/2018    Pneumococcal 23 valent 09/28/2019    TDAP Vaccine (Boostrix) 07/20/2012    Td (Adult), Adsorbed 10/20/1978       ROS A comprehensive review of systems was performed and was otherwise negative    Medications, allergies, and problem list were reviewed and updated    Exam  BP (!)  "148/88   Pulse (!) 109   Temp 97.5  F (36.4  C)   Resp 16   Ht 1.727 m (5' 8\")   Wt 90.7 kg (200 lb)   SpO2 97%   BMI 30.41 kg/m    On exam his right tympanic membrane is normal.  The left tympanic membrane shows at least 7 perfectly spherical bubbles shaped bullae in either the tympanic membrane or behind the tympanic membrane but not on the outside of the tympanic membrane.  There is no wax, ear canal is free of significant wax there is no otitis externa.  There is no erythema or increased pressure inside the ear.  Does not appear to be an otitis media.  Possible that these are air bubbles trapped behind the tympanic membrane.    Assessment/Plan  1. Bullous myringitis of left ear (Primary)  Possibility for bullous myringitis with mild discomfort of the left ear after URI.  But not significant inflammation on my exam.    I am concerned this may be air bubbles trapped behind the tympanic membrane which would naturally resolve.  He is having some mild discomfort, however.  If increasing discomfort in the ear, proceed with azithromycin.  But he was warned on risk of diarrhea, and interacting with his immunosuppression drugs.    I encourage patient to get further evaluation with the ENT clinic if he can get evaluated this week.  - azithromycin (ZITHROMAX) 250 MG tablet; Take 2 tablets (500 mg) by mouth daily for 1 day, THEN 1 tablet (250 mg) daily for 4 days.  Dispense: 6 tablet; Refill: 0  - Adult ENT  Referral; Future    2. Heart replaced by transplant (H)  On immunosuppression.  Stable.    Hypertension mildly high, usually is well-controlled.  He will check blood pressure on his own.  He has a DOT physical coming up.  See me if blood pressure is running too high.  Otherwise he sees me for a physical exam in the fall.    Ulcerative colitis in remission.    Asthma no flare.      Return in 9 months (on 10/16/2025) for Yearly physical exam.   Patient Instructions   You have the appearance of bubbles in " your left eardrum.  This may be from air bubbles behind your eardrum from your nasal a week and a half ago.    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  In case it is an infection, you have been given azithromycin antibiotic to take for 5 days.  Antibiotics can cause diarrhea and antibiotics can affect levels of your immunosuppression drugs.    Do not do any more nasal lavage.    If you have continued symptoms I would recommend further evaluation at the ENT clinic.    Fredrick Wolff MD, MD        Current Outpatient Medications   Medication Sig Dispense Refill    acetaminophen (TYLENOL) 325 MG tablet Take 2 tablets (650 mg) by mouth every 4 hours as needed for mild pain      albuterol (PROAIR HFA/PROVENTIL HFA/VENTOLIN HFA) 108 (90 Base) MCG/ACT inhaler INHALE 2 PUFFS INTO THE LUNGS EVERY 6 HOURS AS NEEDED FOR SHORTNESS OF BREATH OR WHEEZING. 8.5 g 5    amLODIPine (NORVASC) 5 MG tablet Take 2 tablets (10 mg) by mouth daily. 180 tablet 11    aspirin (ASA) 81 MG chewable tablet Take 1 tablet (81 mg) by mouth daily      azaTHIOprine 75 MG TABS Take 75 mg by mouth daily. PLEASE CONFIRM THE DOSE WITH YOUR TRANSPLANT TEAM 90 tablet 3    azithromycin (ZITHROMAX) 250 MG tablet Take 2 tablets (500 mg) by mouth daily for 1 day, THEN 1 tablet (250 mg) daily for 4 days. 6 tablet 0    calcium carbonate (OS-RADAMES) 500 MG tablet Take 1 tablet by mouth daily.      cetirizine (ZYRTEC) 10 MG tablet Take 10 mg by mouth daily      clindamycin (CLINDAMAX) 1 % external gel Apply twice daily as needed for acne on the spots 60 g 11    DULoxetine (CYMBALTA) 20 MG capsule TAKE 1 CAPSULE BY MOUTH EVERY DAY 90 capsule 3    ferrous sulfate (FE TABS) 325 (65 Fe) MG EC tablet 325 mg.      fluticasone (FLONASE) 50 MCG/ACT nasal spray Spray 2 sprays into both nostrils daily.      fluticasone (FLOVENT HFA) 220 MCG/ACT inhaler INHALE 2 PUFFS INTO THE LUNGS TWICE A DAY 12 g 3    Fluticasone Propionate, Inhal, (FLUTICASONE PROPIONATE  DISKUS) 100 MCG/ACT AEPB INHALE 1 PUFF INTO THE LUNGS EVERY 12 HOURS. 60 each 3    levothyroxine (SYNTHROID/LEVOTHROID) 75 MCG tablet TAKE 1 TABLET BY MOUTH EVERY DAY 90 tablet 2    losartan (COZAAR) 25 MG tablet Take 3 tablets (75 mg) by mouth daily. 270 tablet 3    melatonin 3 MG tablet Take 3 mg by mouth nightly as needed for sleep.      mometasone furoate (ASMANEX HFA) 100 MCG/ACT inhaler INHALE 2 PUFFS INTO THE LUNGS TWICE A DAY 13 g 3    multivitamin w/minerals (THERA-VIT-M) tablet Take 1 tablet by mouth daily      omeprazole (PRILOSEC) 40 MG DR capsule TAKE 1 CAPSULE BY MOUTH EVERY DAY 90 capsule 4    RESTASIS 0.05 % ophthalmic emulsion Place 1 drop into both eyes 2 times daily as needed for dry eyes.      rosuvastatin (CRESTOR) 40 MG tablet Take 1 tablet (40 mg) by mouth daily 90 tablet 3    sildenafil (VIAGRA) 50 MG tablet Take 1 tablet (50 mg) by mouth daily as needed (Erectile dysfunction) 10 tablet 11    sirolimus (GENERIC EQUIVALENT) 0.5 MG tablet Take 1 tablet (0.5 mg) by mouth daily. Total dose 1.5 mg daily. 90 tablet 3    sirolimus (GENERIC EQUIVALENT) 1 MG tablet Take 1 tablet (1 mg) by mouth daily. Total dose 1.5 mg daily. 90 tablet 3    tadalafil (CIALIS) 10 MG tablet Take 1 tablet (10 mg) by mouth daily as needed (Erectile dysfunction) 10 tablet 3     Allergies   Allergen Reactions    Hydromorphone Other (See Comments)     Significant Delirium    Adhesive Tape Blisters     Tegaderm causes bruises, blisters and extreme irritation.    Lisinopril Other (See Comments)     hypotension    Quinolones Other (See Comments)     Would not rx quinolones until QTc interval improved.     Tobramycin Other (See Comments)     Would not use aminoglycosides as his kidney function is very borderline    Codeine Nausea     Social History     Tobacco Use    Smoking status: Former     Current packs/day: 0.00     Average packs/day: 1 pack/day for 28.8 years (28.8 ttl pk-yrs)     Types: Cigarettes     Start date: 1/1/1980  "    Quit date: 2008     Years since quittin.2     Passive exposure: Past    Smokeless tobacco: Never   Vaping Use    Vaping status: Never Used   Substance Use Topics    Alcohol use: Yes     Alcohol/week: 1.0 - 2.0 standard drink of alcohol     Types: 1 - 2 Cans of beer per week    Drug use: No             Subjective   Everton is a 59 year old, presenting for the following health issues:  Ear Problem (Lt ear pain, feels clogged and muffled and has been using drops x 1 week)        2025     2:56 PM   Additional Questions   Roomed by Rosalind WILLSON   Accompanied by rafal     Via the Health Maintenance questionnaire, the patient has reported the following services have been completed -Eye Exam: [facility] 2024, this information has been sent to the abstraction team.  Ear Problem    History of Present Illness       Reason for visit:  Ear clog  Symptom onset:  1-2 weeks ago  Symptoms include:  Minor pain, hearing muffled  Symptom intensity:  Moderate  Symptom progression:  Staying the same  Had these symptoms before:  No  What makes it worse:  No  What makes it better:  No   He is taking medications regularly.                     Objective    BP (!) 148/88   Pulse (!) 109   Temp 97.5  F (36.4  C)   Resp 16   Ht 1.727 m (5' 8\")   Wt 90.7 kg (200 lb)   SpO2 97%   BMI 30.41 kg/m    Body mass index is 30.41 kg/m .  Physical Exam               Signed Electronically by: Fredrick Wolff MD    "

## 2025-02-12 NOTE — Clinical Note
Potential access sites were evaluated for patency using ultrasound.   The right femoral artery was selected. Access was obtained under with Sonosite guidance using a standard 18 guage needle with direct visualization of needle entry.    Class III - visualization of the soft palate and the base of the uvula

## 2025-02-18 DIAGNOSIS — Z94.1 TRANSPLANTED HEART (H): ICD-10-CM

## 2025-02-18 DIAGNOSIS — D72.9 ABNORMAL WHITE BLOOD CELL (WBC) COUNT: Primary | ICD-10-CM

## 2025-02-18 NOTE — PROGRESS NOTES
Advanced Heart Failure/Transplant Clinic    HPI:  Everton Larios is a 59 year old male with a history of ASD (s/p repair in childhood), paroxsymal AFib/AF (s/p ablation), NICM, diastolic dysfunction, alcohol abuse, Pierce's esophagus, ulcerative colitis, GIB (s/p splenic embolization), and hypothyroidism, now s/p OHT and bypass of persistent left SVC to right atrial appendage 2/24/19 who presents to clinic for ongoing evaluation and management.     His postoperative course was c/b shock due to RV dysfunction requiring IABP support, small pneumoperitoneum (clinically insignificant), chest wall hematoma (former ICD site, s/p evacuation and drain placement 3/31/19), HCAP/klebsiella pneumonia, and FRANKLIN (required short-term HD, now recovered).  His biopsy 3/25/19 showed mild AMR1 with some staining for c4d. Repeat biopsy 3/28 showed improved AMR and less reactive capillary staining, and subsequent biopsies have now shown resolution of AMR and improved capillary staining.  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 Given concerns for MMF colitis, patient was switched to Imuran in March 2022    Today patient reports he is improving after recent admission for pneumonia. His breathing has improved, but he still has a mild cough. He denies any chest pain or pressure, orthopnea, PND, palpitations, syncope/presyncope or LE edema. He denies productive cough, abdominal pain, or N/V/D. Patient has chronic sinus issues. He also denies any problems with his medications and reports compliance.    ROS:  A complete 12-point ROS was negative except as above.    Past Medical History:   Diagnosis Date    Alcohol abuse     Arthritis     hands, neck    ASD (atrial septal defect)     s/p repair age 3    Asthma     Atrial fibrillation (H)     Pierce's esophagus 10/4/2018    Pierce's esophagus      Cataract     CHF (congestive heart failure) (H)     Chronic rhinitis 10/4/2018    Chronic systolic heart failure (H) 10/4/2018    Clotting disorder     Congenital cardiomyopathy in  (H)     Depression 10/4/2018    Depression     Diastolic dysfunction     DJD (degenerative joint disease)     neck    DJD (degenerative joint disease) - neck 10/4/2018    H/O congenital atrial septal defect (ASD) repair at age 3 10/4/2018    History of anesthesia complications     nausea    History of transfusion     Hypothyroidism     Hypothyroidism due to medication 10/4/2018    ICD (implantable cardioverter-defibrillator) in place     ICD, [employer] ; gen change 2018 10/4/2018    Intermittent asthma without complication 10/4/2018    Nonischemic cardiomyopathy (H) 10/4/2018    On amiodarone therapy 10/4/2018    Pacemaker     Paroxysmal atrial fibrillation (H) 10/4/2018    S/P ablation of atrial fibrillation 10/4/2018    Systolic heart failure (H)     Ulcerative colitis (H)     Ulcerative colitis (H)     Ventricular tachycardia (H) 10/4/2018    Ventricular tachycardia (H)        Past Surgical History:   Procedure Laterality Date    ABLATION OF DYSRHYTHMIC FOCUS      for A fib    AICD, DUAL CHAMBER      ASD REPAIR  1968    BRONCHOSCOPY (RIGID OR FLEXIBLE), DIAGNOSTIC N/A 2019    Procedure: BRONCHOSCOPY, WITH BAL;  Surgeon: Margot Chiang MD;  Location: [facility]    CARDIAC DEFIBRILLATOR PLACEMENT      x2--last was 2018    COLONOSCOPY N/A 2020    Procedure: COLONOSCOPY, WITH POLYPECTOMY AND BIOPSY;  Surgeon: Ranjeet Cooper MD;  Location:  GI    COLONOSCOPY N/A 2015    Procedure: COLONOSCOPY with biopsy;  Surgeon: Fernie Akers MD;  Location: [facility];  Service:     COLONOSCOPY N/A 2017    Procedure: COLONOSCOPY with biopsies and polypectomies using snare;  Surgeon: Gael Romero MD;  Location: [facility];  Service:     COLONOSCOPY N/A 3/8/2022     Procedure: COLONOSCOPY, WITH POLYPECTOMY AND BIOPSY;  Surgeon: Paddy Saldivar DO;  [facility]: [facility]    CV CORONARY ANGIOGRAM N/A 02/12/2020    Procedure: CV CORONARY ANGIOGRAM;  Surgeon: Isiah Mcallister MD;  [facility]    CV CORONARY ANGIOGRAM N/A 03/17/2021    Procedure: CV CORONARY ANGIOGRAM;  Surgeon: Santino Mckeon MD;  [facility]    CV CORONARY ANGIOGRAM N/A 2/23/2022    Procedure: CV CORONARY ANGIOGRAM;  Surgeon: Yves Rodrigues MD;  [facility]    CV CORONARY ANGIOGRAM N/A 2/23/2023    Procedure: Coronary Angiogram;  Surgeon: Santino Mckeon MD;  [facility]    CV HEART BIOPSY N/A 03/04/2019    Procedure: Heart Biopsy;  Surgeon: Abhijeet Titus MD;  [facility]    CV HEART BIOPSY N/A 03/25/2019    Procedure: Heart Biopsy;  Surgeon: Yves Rodrigues MD;  [facility]    CV HEART BIOPSY N/A 03/28/2019    Procedure: Heart Cath Heart Biopsy;  Surgeon: Yves Rodrigues MD;  [facility]    CV HEART BIOPSY N/A 04/10/2019    Procedure: CV HEART BIOPSY;  Surgeon: Isiah Mcallister MD;  [facility]    CV HEART BIOPSY N/A 04/24/2019    Procedure: CV HEART BIOPSY;  Surgeon: Isiah Mcallister MD;  [facility]    CV HEART BIOPSY N/A 05/08/2019    Procedure: CV HEART BIOPSY;  Surgeon: Isiah Mcallister MD;  [facility]    CV HEART BIOPSY N/A 06/04/2019    Procedure: CV HEART BIOPSY;  Surgeon: Alton Solomon MD;  [facility]    CV HEART BIOPSY N/A 05/22/2019    Procedure: CV HEART BIOPSY;  Surgeon: Yves Rodrigues MD;  [facility]    CV HEART BIOPSY N/A 07/17/2019    Procedure: CV HEART BIOPSY;  Surgeon: Isiah Mcallister MD;  [facility]    CV HEART BIOPSY N/A 08/13/2019     Procedure: CV HEART BIOPSY;  Surgeon: Jackson Patten MD;  Location:  HEART CARDIAC CATH LAB    CV HEART BIOPSY N/A 10/15/2019    Procedure: CV HEART BIOPSY;  Surgeon: Santino Mckeon MD;  [facility]    CV HEART BIOPSY N/A 02/12/2020    Procedure: CV HEART BIOPSY;  Surgeon: Isiah Mcallister MD;  Location:  HEART CARDIAC CATH LAB    CV HEART BIOPSY N/A 05/05/2020    Procedure: CV HEART BIOPSY;  Surgeon: Yves Rodrigues MD;  Location:  HEART CARDIAC CATH LAB    CV HEART BIOPSY N/A 03/17/2021    Procedure: CV HEART BIOPSY;  Surgeon: Santino Mckeon MD;  Location:  HEART CARDIAC CATH LAB    CV HEART BIOPSY N/A 5/10/2022    Procedure: Heart Biopsy;  Surgeon: Yves Rodrigues MD;  Location:  HEART CARDIAC CATH LAB    CV INTRA AORTIC BALLOON N/A 02/18/2019    Procedure: Intra-Aortic Balloon;  Surgeon: Alton Solomon MD;  Location:  HEART CARDIAC CATH LAB    CV INTRA AORTIC BALLOON N/A 02/20/2019    Procedure: Replace subclavian IABP;  Surgeon: Yves Rodrigues MD;  Location:  HEART CARDIAC CATH LAB    CV INTRAVASULAR ULTRASOUND N/A 02/12/2020    Procedure: CV INTRAVASCULAR ULTRASOUND;  Surgeon: Isiah Mcallister MD;  Location:  HEART CARDIAC CATH LAB    CV LEFT HEART CATH N/A 03/19/2019    Procedure: Left Heart Cath;  Surgeon: Yves Rodrigues MD;  Location:  HEART CARDIAC CATH LAB    CV LEFT HEART CATH N/A 02/12/2020    Procedure: Left Heart Cath;  Surgeon: Isiah Mcallister MD;  Location:  HEART CARDIAC CATH LAB    CV MYOCARDIAL BIOPSY N/A 03/19/2019    Procedure: RHC/HBx - Femoral access for 3/19 per Nimisha GONZALEZ;  Surgeon: Yves Rodrigues MD;  Location:  HEART CARDIAC CATH LAB    CV MYOCARDIAL BIOPSY N/A 03/11/2019    Procedure: ADD ON RHC/HBX;  Surgeon: Alton Solomon MD;  Location:  HEART CARDIAC CATH LAB    CV RIGHT HEART CATH MEASUREMENTS RECORDED N/A 03/25/2019    Procedure: Right Heart Cath;  Surgeon: Ravi  MD Yves;  Location:  HEART CARDIAC CATH LAB    CV RIGHT HEART CATH MEASUREMENTS RECORDED N/A 03/28/2019    Procedure: Heart Cath Right Heart Cath;  Surgeon: Yves Rodrigues MD;  [facility]    CV RIGHT HEART CATH MEASUREMENTS RECORDED N/A 04/24/2019    Procedure: CV RIGHT HEART CATH;  Surgeon: Isiah Mcallister MD;  [facility]    CV RIGHT HEART CATH MEASUREMENTS RECORDED N/A 06/04/2019    Procedure: CV RIGHT HEART CATH;  Surgeon: Alton Solomon MD;  [facility]    CV RIGHT HEART CATH MEASUREMENTS RECORDED N/A 05/22/2019    Procedure: CV RIGHT HEART CATH;  Surgeon: Yves Rodrigues MD;  Location:  HEART CARDIAC CATH LAB    CV RIGHT HEART CATH MEASUREMENTS RECORDED N/A 08/13/2019    Procedure: CV RIGHT HEART CATH;  Surgeon: Jackson Patten MD;  Location:  HEART CARDIAC CATH LAB    CV RIGHT HEART CATH MEASUREMENTS RECORDED N/A 10/15/2019    Procedure: CV RIGHT HEART CATH;  Surgeon: Santino Mckeon MD;  Location:  HEART CARDIAC CATH LAB    CV RIGHT HEART CATH MEASUREMENTS RECORDED N/A 02/12/2020    Procedure: CV RIGHT HEART CATH;  Surgeon: Isiah Mcallister MD;  Location:  HEART CARDIAC CATH LAB    CV RIGHT HEART CATH MEASUREMENTS RECORDED N/A 03/17/2021    Procedure: CV RIGHT HEART CATH;  Surgeon: Santino Mckeon MD;  Location:  HEART CARDIAC CATH LAB    CV RIGHT HEART CATH MEASUREMENTS RECORDED N/A 2/23/2022    Procedure: CV RIGHT HEART CATH;  Surgeon: Yves Rodrigues MD;  Location:  HEART CARDIAC CATH LAB    CV RIGHT HEART CATH MEASUREMENTS RECORDED N/A 5/10/2022    Procedure: Right Heart Catheterization;  Surgeon: Yves Rodrigues MD;  Location:  HEART CARDIAC CATH LAB    CV RIGHT HEART CATH MEASUREMENTS RECORDED N/A 2/23/2023    Procedure: Right Heart Catheterization;  Surgeon: Santino Mckeon MD;  Location:  HEART CARDIAC CATH LAB    ESOPHAGOSCOPY, GASTROSCOPY, DUODENOSCOPY  (EGD), COMBINED N/A 12/19/2017    Procedure: ESOPHAGOGASTRODUODENOSCOPY (EGD) with biopsies;  Surgeon: Gael Romero MD;  Location: [facility];  Service:     ESOPHAGOSCOPY, GASTROSCOPY, DUODENOSCOPY (EGD), COMBINED N/A 3/8/2022    Procedure: ESOPHAGOGASTRODUODENOSCOPY, WITH BIOPSY;  Surgeon: Paddy Saldivar DO;  Location: [facility]    H STATISTIC PICC LINE INSERTION >5YR, FAILED Bilateral 10/28/2021    L sided SVC    HAND SURGERY Left     HC REVISE MEDIAN N/CARPAL TUNNEL SURG  09/21/2016    Procedure: OPEN RIGHT CARPAL TUNNEL RELEASE CORTISONE INJECTION RIGHT THUMB;  Surgeon: Duong Santoyo MD;  [facility];  Service: Orthopedics    INCISION AND DRAINAGE CHEST WASHOUT, COMBINED N/A 03/21/2019    Procedure: Incision And Drainage; Evacuation Right Chest Wall Hematoma;  Surgeon: Dewayne House MD;  [facility]    INSERT INTRAAORTIC BALLOON PUMP Left 02/19/2019    Procedure: Insert left  Subclavian Balloon Pump,  Removal Right femoral arterial balloom pump sheath;  Surgeon: Dewayne House MD;  [facility]    INSERT INTRAAORTIC BALLOON PUMP Left 02/21/2019    Procedure: SUBCLAVIAN BALLOON PUMP PLACEMENT;  Surgeon: Ben White MD;  [facility]    INSERT INTRACORONARY STENT      x2    IR PICC PLACEMENT > 5 YRS OF AGE  05/08/2019    IR PICC PLACEMENT > 5 YRS OF AGE  9/21/2024    TRANSPLANT HEART RECIPIENT N/A 02/24/2019    Procedure: TRANSPLANT HEART RECIPIENT;  Surgeon: Dewayne House MD;  [facility]       Family History   Problem Relation Age of Onset    Skin Cancer Mother     Endometrial Cancer Mother     Osteoporosis Mother     Hypertension Father     Endometrial Cancer Maternal Grandmother     Hip fracture No family hx of     Nephrolithiasis No family hx of        Social History     Tobacco Use    Smoking status: Former     Current packs/day: 0.00     Average packs/day: 1 pack/day for 28.8 years (28.8 ttl pk-yrs)     Types: Cigarettes     Start date:  1980     Quit date: 2008     Years since quittin.2     Passive exposure: Past    Smokeless tobacco: Never   Substance Use Topics    Alcohol use: Yes     Alcohol/week: 1.0 - 2.0 standard drink of alcohol     Types: 1 - 2 Cans of beer per week       MEDICATIONS:  Current Outpatient Medications   Medication Sig Dispense Refill    acetaminophen (TYLENOL) 325 MG tablet Take 2 tablets (650 mg) by mouth every 4 hours as needed for mild pain      albuterol (PROAIR HFA/PROVENTIL HFA/VENTOLIN HFA) 108 (90 Base) MCG/ACT inhaler INHALE 2 PUFFS INTO THE LUNGS EVERY 6 HOURS AS NEEDED FOR SHORTNESS OF BREATH OR WHEEZING. 8.5 g 5    amLODIPine (NORVASC) 5 MG tablet Take 2 tablets (10 mg) by mouth daily. 180 tablet 11    aspirin (ASA) 81 MG chewable tablet Take 1 tablet (81 mg) by mouth daily      azaTHIOprine 75 MG TABS Take 75 mg by mouth daily. PLEASE CONFIRM THE DOSE WITH YOUR TRANSPLANT TEAM 90 tablet 3    calcium carbonate (OS-RADAMES) 500 MG tablet Take 1 tablet by mouth daily.      cetirizine (ZYRTEC) 10 MG tablet Take 10 mg by mouth daily      clindamycin (CLINDAMAX) 1 % external gel Apply twice daily as needed for acne on the spots 60 g 11    DULoxetine (CYMBALTA) 20 MG capsule TAKE 1 CAPSULE BY MOUTH EVERY DAY 90 capsule 3    ferrous sulfate (FE TABS) 325 (65 Fe) MG EC tablet 325 mg.      fluticasone (FLONASE) 50 MCG/ACT nasal spray Spray 2 sprays into both nostrils daily.      fluticasone (FLOVENT HFA) 220 MCG/ACT inhaler INHALE 2 PUFFS INTO THE LUNGS TWICE A DAY 12 g 3    Fluticasone Propionate, Inhal, (FLUTICASONE PROPIONATE DISKUS) 100 MCG/ACT AEPB INHALE 1 PUFF INTO THE LUNGS EVERY 12 HOURS. 60 each 3    levothyroxine (SYNTHROID/LEVOTHROID) 75 MCG tablet TAKE 1 TABLET BY MOUTH EVERY DAY 90 tablet 2    losartan (COZAAR) 100 MG tablet Take 1 tablet (100 mg) by mouth daily. 90 tablet 3    melatonin 3 MG tablet Take 3 mg by mouth nightly as needed for sleep.      mometasone furoate (ASMANEX HFA) 100 MCG/ACT  inhaler INHALE 2 PUFFS INTO THE LUNGS TWICE A DAY 13 g 3    multivitamin w/minerals (THERA-VIT-M) tablet Take 1 tablet by mouth daily      omeprazole (PRILOSEC) 40 MG DR capsule TAKE 1 CAPSULE BY MOUTH EVERY DAY 90 capsule 4    RESTASIS 0.05 % ophthalmic emulsion Place 1 drop into both eyes 2 times daily as needed for dry eyes.      rosuvastatin (CRESTOR) 40 MG tablet Take 1 tablet (40 mg) by mouth daily 90 tablet 3    sildenafil (VIAGRA) 50 MG tablet Take 1 tablet (50 mg) by mouth daily as needed (Erectile dysfunction) 10 tablet 11    sirolimus (GENERIC EQUIVALENT) 0.5 MG tablet Take 1 tablet (0.5 mg) by mouth daily. Total dose 1.5 mg daily. 90 tablet 3    sirolimus (GENERIC EQUIVALENT) 1 MG tablet Take 1 tablet (1 mg) by mouth daily. Total dose 1.5 mg daily. 90 tablet 3    tadalafil (CIALIS) 10 MG tablet Take 1 tablet (10 mg) by mouth daily as needed (Erectile dysfunction) 10 tablet 3       EXAM:   BP (!) 143/91 (BP Location: Right arm, Patient Position: Chair, Cuff Size: Adult Large)   Pulse 111   Wt 92.1 kg (203 lb)   SpO2 99%   BMI 30.87 kg/m    General: appears comfortable, alert and interactive, in no acute distress  Head: normocephalic, atraumatic, mild swelling along right cheek and bottom lip  Eyes: anicteric sclera, EOMI  Mouth: teeth intact, no tenderness or sores in the mouth  Neck: supple, no cervical adenopathy  CV: regular rate and rhythm, S1/S2, no murmur, gallop, rub, estimated JVP ~7 cm  Resp: clear, no rales or wheezing  GI: soft, nontender, nondistended, +BS  Extremities: warm, no peripheral edema, 2+ bilateral radial pulses  Neurological: normal speech and affect, no gross motor deficits  Psych: normal mood and affect  Derm: no rashes or lesions on exposed surfaces    Wt Readings from Last 4 Encounters:   02/19/25 92.1 kg (203 lb)   01/27/25 90.7 kg (200 lb)   10/15/24 86.2 kg (190 lb)   10/02/24 86.6 kg (191 lb)      I personally reviewed recent labs and data as below and discussed the  results with the patient in clinic today.  Labs:  CBC RESULTS:  Lab Results   Component Value Date    WBC 3.5 (L) 02/19/2025    WBC 5.0 04/09/2021    RBC 5.05 02/19/2025    RBC 4.61 04/09/2021    HGB 14.1 02/19/2025    HGB 11.2 (A) 04/09/2021    HCT 42.1 02/19/2025    HCT 34.6 04/09/2021    MCV 83 02/19/2025    MCV 75 04/09/2021    MCH 27.9 02/19/2025    MCH 24.3 04/09/2021    MCHC 33.5 02/19/2025    MCHC 32.4 04/09/2021    RDW 14.4 02/19/2025    RDW 14.9 04/09/2021     02/19/2025     04/09/2021       CMP RESULTS:  Lab Results   Component Value Date     02/19/2025     03/17/2021    POTASSIUM 4.8 02/19/2025    POTASSIUM 4.4 05/28/2022    POTASSIUM 3.9 03/17/2021    CHLORIDE 101 02/19/2025    CHLORIDE 107 05/28/2022    CHLORIDE 106 03/17/2021    CO2 19 (L) 02/19/2025    CO2 24 05/28/2022    CO2 24 03/17/2021    ANIONGAP 16 (H) 02/19/2025    ANIONGAP 6 05/28/2022    ANIONGAP 8 03/17/2021    GLC 94 02/19/2025    GLC 97 05/28/2022    GLC 85 03/17/2021    BUN 34.8 (H) 02/19/2025    BUN 31 (H) 05/28/2022    BUN 32 (H) 03/17/2021    CR 2.07 (H) 02/19/2025    CR 1.80 (H) 03/17/2021    GFRESTIMATED 36 (L) 02/19/2025    GFRESTIMATED 34 (L) 06/22/2021    GFRESTIMATED 41 (L) 03/17/2021    GFRESTBLACK 41 (L) 06/22/2021    GFRESTBLACK 47 (L) 03/17/2021    RADAMES 9.6 02/19/2025    RADAMES 9.0 03/17/2021    BILITOTAL 0.4 02/19/2025    BILITOTAL 0.5 03/17/2021    ALBUMIN 4.2 02/19/2025    ALBUMIN 3.2 (L) 02/23/2022    ALBUMIN 3.6 03/17/2021    ALKPHOS 116 02/19/2025    ALKPHOS 131 03/17/2021    ALT 27 02/19/2025    ALT 49 03/17/2021    AST 32 02/19/2025    AST 48 (H) 03/17/2021        INR RESULTS:  Lab Results   Component Value Date    INR 1.17 (H) 09/23/2019       Lab Results   Component Value Date    MAG 2.0 02/19/2025    MAG 1.6 03/17/2021     Lab Results   Component Value Date    NTBNPI 1,445 (H) 09/20/2024    NTBNPI 767 09/23/2019     Lab Results   Component Value Date    NTBNP 10,986 (H) 11/15/2018     cMRI  2/19/25  SUMMARY  ===================================================================  Clinical history: 59-year-old man with a heart transplant on 02/24/2019.  Comparison CMR: 04/18/2024  1. The LV is normal in cavity size and wall thickness. The global systolic function is normal. The LVEF is 56%. There are no regional wall motion abnormalities.  2. The RV is normal in cavity size. The global systolic function is normal. The RVEF is 66%.  3. Both atria are severely dilated in size.  4. There is no significant valvular disease.   5. Late gadolinium enhancement imaging shows no myocardial infarction, replacement fibrosis, or infiltration.  6. Regadenoson stress perfusion imaging shows no ischemia.  7. There is no pericardial effusion or thickening.  8. There is no intracardiac thrombus.  CONCLUSIONS: Orthotopic heart transplant recipient with LVEF of 56% and RVEF 66% without any LGE. Compared to the prior CMR dated 04/18/2024, there have been no significant changes.    TTE 9/21/24  Interpretation Summary  Left ventricular size, wall motion and function are normal. The ejection  fraction is 60-65%.  Mildly decreased right ventricular systolic function  The rhythm was sinus tachycardia.  Right ventricular systolic pressure could not be approximated due to  inadequate tricuspid regurgitation.  No hemodynamically significant valvular abnormalities on 2D or color flow imaging.    cMRI 4/18/24  SUMMARY   ===================================================================  Clinical history: 59-year old male with a history of orthotopic heart transplantation in 2019. His last coronary angiogram in 02/2023 was devoid of coronary allograft vasculopathy. CMR for graft surveillance.  Comparison CMR: 02/23/2022  1. The LV is normal in cavity size and wall thickness. The global systolic function is normal. The LVEF is 54%. There are no regional wall motion abnormalities.  2. The RV is normal in cavity size. The global systolic  function is normal. The RVEF is 54%.   3. The left atrium is mildly enlarged due to transplantation. The right atrium is normal in size.   4. There is no significant valvular disease.   5. Late gadolinium enhancement imaging shows a small burden of patchy, midmyocardial fibrosis in the basal inferoseptal segment. This is of unclear significance.  6. Regadenoson stress perfusion imaging shows no ischemia.  7.  There is no pericardial effusion or thickening.  8. There is no intracardiac thrombus.  CONCLUSIONS: Orthotopic heart transplant. Normal biventricular graft function without ischemia. there is a small burden of fibrosis of unclear significance. Upon direct visual comparison to the prior examinations dated 03/17/2021 and 02/23/2022, there are no significant changes in the graft function or the degree of fibrosis.     TTE 2/23/23  Interpretation Summary  Left ventricular size, wall motion and function are normal. The ejection  fraction is 55-60%.  On direct comparison, the global right ventricular function appears mildly  reduced compared to 2022, but TAPSE and S' values are similar.  No significant valve abnormalities.  The inferior vena cava is normal.  No pericardial effusion.    Coronary Angiogram/RHC 2/23/23  Coronary Findings    Diagnostic  Dominance: Right  Left Main   The vessel is large. There was 0% vessel disease.      Left Anterior Descending   The vessel is large. There was 0% vessel disease.      Left Circumflex   The vessel is moderate in size. There was 0% vessel disease.      First Obtuse Marginal Branch   The vessel is large.      Right Coronary Artery   The vessel is large. There was 0% vessel disease.         Intervention     No interventions have been documented.     Hemodynamics    Right Heart Catheterization:  /89/114 mmHg  HR 92 BPM    RA 7/11/7 mmHg  RV 40/7 mmHg  PA 40/19/28 mmHg  PCW 17/24/17 mmHg  Jaden CO 7.13 L/min Normal = 4.0-8.0 L/min  Jaden CI 3.64 L/min/m2 Normal = 2.5-4.0  L/min/m2  TD CO 6.93 L/min Normal = 4.0-8.0 L/min  TD CI 3.54 L/min/m2 Normal = 2.5-4.0 L/min/m2  PA sat 72.4%   Hgb 10.7 g/dL   PVR 1.71 Woods units  SVR 1021 dynes-sec/cm5     ECG 2/23/23 shows sinus rhythm with nonspecific intraventricular conduction block, no acute ST-T changes    [facility] 2/23/22  RA: 10  RV: 31 (11)  PA: 30/14 (21)  PVR: 0.91  Jaden CO: 6.3    CI: 3.3  TDCO: 7.4       CI: 3.8     Angiogram 2/23/22  Left Main   The vessel is large. There was 0% vessel disease.   Left Anterior Descending   The vessel is large. There was 0% vessel disease.   Left Circumflex   The vessel is moderate in size. There was 0% vessel disease.   Right Coronary Artery   The vessel is large. There was 0% vessel disease.     CMR 2/23/22  Clinical history: 56-year-old man post orthotopic heart transplantation in 2019  Comparison CMR: March 2021  1. The left ventricle is normal in cavity size and wall thickness. There are no regional wall motion  abnormalities. The global systolic function is normal. The LVEF is 55%.  2. The right ventricle is normal in cavity size. The global systolic function is normal. The RVEF is 54%.   3. The left atrium is enlarged due to transplantation and the right atrium is normal in size.  4. There is no significant valvular disease.   5. There is patchy midmyocardial late gadolinium enhancement at the basal-mid RV insertion sites consistent with non ischemic fibrosis.    6. There is no ischemia on stress perfusion imaging.  7. There is no pericardial effusion.  8. There is no intracardiac thrombus.  CONCLUSIONS:   Post heart transplantation.  Normal biventricular size and function, LVEF 55 % and RVEF 54%.   No evidence of myocardial ischemia.  No significant changes when compared to prior study from 2021    Echo 8/31/21  Interpretation Summary  Global and regional left ventricular function is normal with an EF of 60-65%.  Global right ventricular function is normal.  Pulmonary artery systolic pressure  is normal.  The inferior vena cava was normal in size with preserved respiratory variability.  No pericardial effusion is present.    RHC and angiography 2/12/2020  Pressures Phase   Time  Systolic  Diastolic  Mean  A Wave  V Wave  EDP  Max dp/dt  HR    RA Pressures  10:37 AM    7 mmHg     9 mmHg     7 mmHg       95 bpm       RV Pressures  10:37 AM  30 mmHg         9 mmHg      95 bpm       PA Pressures  10:38 AM  30 mmHg     16 mmHg     20 mmHg         94 bpm       PCW Pressures  10:38 AM    12 mmHg     13 mmHg     13 mmHg       95 bpm       AO Pressures  11:00 AM  94 mmHg     72 mmHg     83 mmHg         98 bpm       Art Pressures  10:59 AM  120 mmHg     74 mmHg     91 mmHg         103 bpm       LV Pressures  10:59 AM  120 mmHg         17 mmHg      102 bpm          Time  Hb  SAT(%)  PO2  Content  PA Sat    PA  10:21 AM   71.5 %       71.5 %       Art  10:21 AM   100 %      14.28 mL/dL        Cardiac Output    Time  TDCO  TDCI  Jaden C.O.  Jaden C.I.  Jaden HR    Cardiac Output Results  10:21 AM  6.77 L/min     3.74 L/min/m2     5.68 L/min     3.13 L/min/m2            Left Main    The vessel was visualized by selective angiography and is large. There was 0% vessel disease.    Left Anterior Descending    The vessel was visualized by selective angiography and is large. There was 0% vessel disease. IVUS was performed on the LMCA and LAD vessels. The LMCA was engaged with a 6 Fr Ikari LF 3.5 GC and the patient was anticoagulated with IV UFH. A BMW was passed into the distal LAD without difficulty. The [place] [facility] IVUS was passed over the wire and manually pulled back while recording. Proximal LAD HERACLIO 0.3 Lumen 24.1 Vessel 29.8 19% area stenosis Mid LAD HERACLIO 0.3 Lumen 10.8 Vessel 13.7 21% area stenosis IVUS was performed on the vessel. Ultrasound supply: [facility] IVUS SHRT.    Left Circumflex    The vessel was visualized by selective angiography and is moderate in size. There was 0% vessel disease.    Right Coronary  Artery    The vessel was visualized by selective angiography and is large. There was 0% vessel disease.    Conclusion  Mild intimal hyperplasia with up to 0.3 mm HERACLIO and 20% narrowing by area.    ECHO 12/18/2019  Interpretation Summary  Left ventricular function, chamber size, wall motion, and wall thickness are  normal.The EF is 60-65%.  Right ventricular function, chamber size, wall motion, and thickness are  normal.  No significant valvular abnormalities were noted.  Pulmonary artery systolic pressure is normal.  The inferior vena cava was normal in size with preserved respiratory  variability.  No pericardial effusion is present.      RHC and EMB 10/15/19:  Conclusion     Right sided filling pressures are normal.  Left sided filling pressures are normal.  Normal PA pressures.  Normal cardiac output level.  Successful collection of endomyocardial biopsies          Plan      Follow bedrest per protocol   Continued medical management and lifestyle modifications for cardiovascular risk factor optimizations.   Discharge today per protocol   Hemodynamics     Right Heart Pressures     Right sided filling pressures are normal.Left sided filling pressures are normal. Normal PA pressures.Normal cardiac output level.   Heart Biospy     Heart Biopsy     After informed consent patient was prepped and draped in the usual fashion. Under fluoroscopy guidance a 7 Fr angeled sheath was placed into the right internal jugular vein after local anesthesia with 1.0% lidocaine. 5.5 [place] Jawz bioptome was passed through the sheath to the right ventricular septum and 5 biopsies were obtained. The biopsy specimens were sent to Pathology for examination. The sheath was removed and hemostasis was obtained. The patient tolerated the procedure well and there were no complications.   Pressures Phase: Baseline      Time Systolic Diastolic Mean A Wave V Wave EDP Max dp/dt HR   RA Pressures 11:49 AM   2 mmHg    3 mmHg    4 mmHg      105 bpm       RV Pressures 11:35 AM       1776 mmHg/sec        11:49 AM 22 mmHg        4 mmHg     105 bpm      PA Pressures 11:50 AM 20 mmHg    10 mmHg    14 mmHg        105 bpm      PCW Pressures 11:49 AM   6 mmHg    7 mmHg    7 mmHg      105 bpm      Blood Flow Results Phase: Baseline      Time Results Indexed Values   QP 11:35 AM 5.92 L/min    3.66 L/min/m2      QS 11:35 AM 5.92 L/min    3.66 L/min/m2      Blood Oximetry Phase: Baseline      Time Hb SAT(%) PO2 Content PA Sat   PA 11:35 AM  66.6 %      66.6 %      Art 11:35 AM  100 %     10.74 mL/dL       Cardiac Output Phase: Baseline      Time TDCO TDCI Jaden C.O. Jaden C.I. Jaden HR   Cardiac Output Results 11:35 AM 6.03 L/min    3.74 L/min/m2    5.92 L/min    3.66 L/min/m2        11:51 AM 6.03 L/min          Resistance Results Phase: Baseline      Time PVR SVR PVR-I SVR-I TPR TVR TPR-I TVR-I PVR/SVR TPR/TVR   Resistance Results (Metric) 11:35 .64 dsc-5     172.25 dsc-5/m2     186.62 dsc-5     301.45 dsc-5/m2         Resistance Results (Wood) 11:35 AM 1.33 MARTIN     2.15 MARTIN/m2     2.33 MARTIN     3.77 MARTIN/m2         Stoke Volume Results Phase: Baseline      Time RVSW LVSW RVSW-I LVSW-I   Stroke Work Results 11:35 AM 9.33 gm*m     5.77 gm*m/m2         TTE 10/15/19:  Interpretation Summary  Left ventricular function, chamber size, wall motion, and wall thickness are  normal.The EF is 60-65%. No regional wall motion abnormalities are seen.  Right ventricular function, chamber size, wall motion, and thickness are  normal.  Trace tricuspid insufficiency is present. Pulmonary artery systolic pressure  is normal. The inferior vena cava was normal in size with preserved  respiratory variability. No pericardial effusion is present.  There has been no change.    Assessment and Plan:    59 year old male with a history of ASD (s/p repair in childhood), paroxsymal AFib/AF (s/p ablation), NICM, diastolic dysfunction, alcohol abuse, Pierce's esophagus, ulcerative colitis, GIB (s/p splenic  embolization), and hypothyroidism, now s/p OHT and bypass of persistent left SVC to right atrial appendage 2/24/19 who presents for ongoing evaluation and management.    NICM s/p OHT and bypass of persistent left SVC to right atrial appendage 2/24/19  His postoperative course was c/b shock due to RV dysfunction requiring IABP support, small pneumoperitoneum (clinically insignificant), chest wall hematoma (former ICD site, s/p evacuation and drain placement 3/31/19), HCAP/klebsiella pneumonia, and FRANKLIN (required short-term HD, now recovered). His biopsy 3/25/19 showed mild AMR1 with some staining for c4d.  Repeat biopsy 3/28/19 showed improved AMR and less reactive capillary staining, and subsequent biopsies have now shown resolution of AMR and improved capillary staining.     Today patient is euvolemic by history and exam. Overall doing well. No CAV and grossly normal filling pressures with normal cardiac index/output on last surveillance testing. CMRI with normal biventricular function.     Serostatus:  - CMV D+/R+  - EBV D+/R+  - Toxo D-/R-     Immunosuppression:  - Sirolimus 4 mg daily, goal level 4-6, level pending from today  - Azathioprine 75 mg daily     Graft function:  - BPs: Not well controlled, increase losartan to 100 mg daily and continue amlodipine 10 mg daily. Instructed patient to call clinic if BP consistently > 140/90  - HRs:  Stable  - fluid status:  Euvolemic, off diuretics      PPx:  - CAV: Aspirin 81mg daily and rosuvastatin 40 mg daily, referral to lipid clinic given still elevated above goal   - GERD:  Omeprazole (note h/o carrera's esophagus)  - Osteoporosis:  Calcium/vitamin D supplements     Routine screenings  Derm: DUE  Dental: UTD  Colonoscopy: due in 2025  EGD: due in 2025  Prostate:  stable  Eye: UTD  Flu/Pneumonia: UTD  RSV: DUE  Shingles: DUE  Covid: UTD     Ulcerative Colitis  Carrera Esophagitis  - Followed by GI    Chronic Kidney Disease Stage III  - Follows with Renal    Chronic  Sinus Infections  - Follows with ENT  - Discussed that if any surgeries were to be planned in the future, would need to switch sirolimus back to tac for wound healing    Chronic Right Shoulder Pain  - Follows with Ortho    Optimal Vascular Metrics    Blood Pressure   BP < 140/90 Yes    On Aspirin  Yes    On Statin  Yes    Tobacco use  No     To Do:   - Increase losartan to 100 mg daily  - Repeat labs in 1-2 weeks  - Referral to lipid clinic  - Needs RSV, Shingles vaccines  - Needs EGD and colonoscopy this year  - Needs Derm follow up  - Monitor and record home BPs  - SOT labs in 6 months  - Follow up in one year for annual testing with coronary angiogram/RHC and echo if renal function remains stable    A total of 49 minutes was spent on the day of the visit, which includes preparation for the visit (reviewing previous medical records, laboratories and investigations), in conjunction with the actual clinic visit with the patient, which includes obtaining a history and physical exam, creating and reviewing the care plan, patient education (and family if present), counseling, documenting clinical information in the electronic health record and care coordination.     The longitudinal plan of care for the diagnosis(es)/condition(s) as documented were addressed during this visit. Due to the added complexity in care, I will continue to support [place]ton in the subsequent management and with ongoing continuity of care.     Slime Lindsay MD   of Medicine, [facility]  Advanced Heart Failure and Transplant Cardiology     Fredrick Todd

## 2025-02-19 ENCOUNTER — LAB (OUTPATIENT)
Dept: LAB | Facility: CLINIC | Age: 60
End: 2025-02-19
Attending: STUDENT IN AN ORGANIZED HEALTH CARE EDUCATION/TRAINING PROGRAM
Payer: COMMERCIAL

## 2025-02-19 ENCOUNTER — OFFICE VISIT (OUTPATIENT)
Dept: CARDIOLOGY | Facility: CLINIC | Age: 60
End: 2025-02-19
Attending: STUDENT IN AN ORGANIZED HEALTH CARE EDUCATION/TRAINING PROGRAM
Payer: COMMERCIAL

## 2025-02-19 ENCOUNTER — HOSPITAL ENCOUNTER (OUTPATIENT)
Dept: GENERAL RADIOLOGY | Facility: CLINIC | Age: 60
Discharge: HOME | End: 2025-02-19
Attending: STUDENT IN AN ORGANIZED HEALTH CARE EDUCATION/TRAINING PROGRAM
Payer: COMMERCIAL

## 2025-02-19 ENCOUNTER — HOSPITAL ENCOUNTER (OUTPATIENT)
Dept: MRI IMAGING | Facility: CLINIC | Age: 60
Discharge: HOME | End: 2025-02-19
Attending: STUDENT IN AN ORGANIZED HEALTH CARE EDUCATION/TRAINING PROGRAM
Payer: COMMERCIAL

## 2025-02-19 VITALS
SYSTOLIC BLOOD PRESSURE: 143 MMHG | BODY MASS INDEX: 30.87 KG/M2 | HEART RATE: 111 BPM | WEIGHT: 203 LBS | DIASTOLIC BLOOD PRESSURE: 91 MMHG | OXYGEN SATURATION: 99 %

## 2025-02-19 VITALS
DIASTOLIC BLOOD PRESSURE: 95 MMHG | HEART RATE: 102 BPM | RESPIRATION RATE: 16 BRPM | SYSTOLIC BLOOD PRESSURE: 154 MMHG | OXYGEN SATURATION: 100 %

## 2025-02-19 DIAGNOSIS — Z94.1 TRANSPLANTED HEART (H): ICD-10-CM

## 2025-02-19 DIAGNOSIS — I10 BENIGN ESSENTIAL HYPERTENSION: ICD-10-CM

## 2025-02-19 DIAGNOSIS — D84.9 IMMUNOSUPPRESSION: ICD-10-CM

## 2025-02-19 DIAGNOSIS — Z13.220 LIPID SCREENING: ICD-10-CM

## 2025-02-19 DIAGNOSIS — N18.32 CHRONIC KIDNEY DISEASE, STAGE 3B (H): ICD-10-CM

## 2025-02-19 DIAGNOSIS — Z00.00 HEALTH CARE MAINTENANCE: ICD-10-CM

## 2025-02-19 DIAGNOSIS — Z12.5 PROSTATE CANCER SCREENING: ICD-10-CM

## 2025-02-19 DIAGNOSIS — Z13.29 THYROID DISORDER SCREENING: ICD-10-CM

## 2025-02-19 DIAGNOSIS — Z94.1 TRANSPLANTED HEART (H): Primary | ICD-10-CM

## 2025-02-19 DIAGNOSIS — E11.9 DIABETES MELLITUS (H): ICD-10-CM

## 2025-02-19 DIAGNOSIS — E78.5 HYPERLIPIDEMIA LDL GOAL <100: ICD-10-CM

## 2025-02-19 LAB
ALBUMIN SERPL BCG-MCNC: 4.2 G/DL (ref 3.5–5.2)
ALBUMIN UR-MCNC: 300 MG/DL
ALP SERPL-CCNC: 116 U/L (ref 40–150)
ALT SERPL W P-5'-P-CCNC: 27 U/L (ref 0–70)
ANION GAP SERPL CALCULATED.3IONS-SCNC: 16 MMOL/L (ref 7–15)
APPEARANCE UR: CLEAR
AST SERPL W P-5'-P-CCNC: 32 U/L (ref 0–45)
ATRIAL RATE - MUSE: 103 BPM
ATRIAL RATE - MUSE: 104 BPM
BASOPHILS # BLD AUTO: 0 10E3/UL (ref 0–0.2)
BASOPHILS NFR BLD AUTO: 0 %
BILIRUB SERPL-MCNC: 0.4 MG/DL
BILIRUB UR QL STRIP: NEGATIVE
BUN SERPL-MCNC: 34.8 MG/DL (ref 8–23)
CALCIUM SERPL-MCNC: 9.6 MG/DL (ref 8.8–10.4)
CHLORIDE SERPL-SCNC: 101 MMOL/L (ref 98–107)
CHOLEST SERPL-MCNC: 243 MG/DL
CK SERPL-CCNC: 260 U/L (ref 39–308)
CMV DNA SPEC NAA+PROBE-ACNC: NOT DETECTED IU/ML
COLOR UR AUTO: ABNORMAL
CREAT SERPL-MCNC: 2.07 MG/DL (ref 0.67–1.17)
DIASTOLIC BLOOD PRESSURE - MUSE: NORMAL MMHG
DIASTOLIC BLOOD PRESSURE - MUSE: NORMAL MMHG
EBV DNA SERPL NAA+PROBE-ACNC: NOT DETECTED IU/ML
EGFRCR SERPLBLD CKD-EPI 2021: 36 ML/MIN/1.73M2
EOSINOPHIL # BLD AUTO: 0.1 10E3/UL (ref 0–0.7)
EOSINOPHIL NFR BLD AUTO: 3 %
ERYTHROCYTE [DISTWIDTH] IN BLOOD BY AUTOMATED COUNT: 14.4 % (ref 10–15)
EST. AVERAGE GLUCOSE BLD GHB EST-MCNC: 123 MG/DL
FASTING STATUS PATIENT QL REPORTED: ABNORMAL
FASTING STATUS PATIENT QL REPORTED: ABNORMAL
GLUCOSE SERPL-MCNC: 94 MG/DL (ref 70–99)
GLUCOSE UR STRIP-MCNC: NEGATIVE MG/DL
HBA1C MFR BLD: 5.9 %
HCO3 SERPL-SCNC: 19 MMOL/L (ref 22–29)
HCT VFR BLD AUTO: 42.1 % (ref 40–53)
HDLC SERPL-MCNC: 91 MG/DL
HGB BLD-MCNC: 14.1 G/DL (ref 13.3–17.7)
HGB UR QL STRIP: ABNORMAL
IMM GRANULOCYTES # BLD: 0 10E3/UL
IMM GRANULOCYTES NFR BLD: 0 %
INTERPRETATION ECG - MUSE: NORMAL
INTERPRETATION ECG - MUSE: NORMAL
KETONES UR STRIP-MCNC: NEGATIVE MG/DL
LDLC SERPL CALC-MCNC: 124 MG/DL
LEUKOCYTE ESTERASE UR QL STRIP: NEGATIVE
LYMPHOCYTES # BLD AUTO: 0.8 10E3/UL (ref 0.8–5.3)
LYMPHOCYTES NFR BLD AUTO: 22 %
MAGNESIUM SERPL-MCNC: 2 MG/DL (ref 1.7–2.3)
MCH RBC QN AUTO: 27.9 PG (ref 26.5–33)
MCHC RBC AUTO-ENTMCNC: 33.5 G/DL (ref 31.5–36.5)
MCV RBC AUTO: 83 FL (ref 78–100)
MONOCYTES # BLD AUTO: 0.3 10E3/UL (ref 0–1.3)
MONOCYTES NFR BLD AUTO: 9 %
NEUTROPHILS # BLD AUTO: 2.3 10E3/UL (ref 1.6–8.3)
NEUTROPHILS NFR BLD AUTO: 66 %
NITRATE UR QL: NEGATIVE
NONHDLC SERPL-MCNC: 152 MG/DL
NRBC # BLD AUTO: 0 10E3/UL
NRBC BLD AUTO-RTO: 0 /100
P AXIS - MUSE: 57 DEGREES
P AXIS - MUSE: 64 DEGREES
PH UR STRIP: 7 [PH] (ref 5–7)
PHOSPHATE SERPL-MCNC: 3.5 MG/DL (ref 2.5–4.5)
PLATELET # BLD AUTO: 176 10E3/UL (ref 150–450)
POTASSIUM SERPL-SCNC: 4.8 MMOL/L (ref 3.4–5.3)
PR INTERVAL - MUSE: 170 MS
PR INTERVAL - MUSE: 184 MS
PROT SERPL-MCNC: 8.1 G/DL (ref 6.4–8.3)
PSA SERPL DL<=0.01 NG/ML-MCNC: 0.81 NG/ML (ref 0–4.5)
QRS DURATION - MUSE: 130 MS
QRS DURATION - MUSE: 142 MS
QT - MUSE: 376 MS
QT - MUSE: 380 MS
QTC - MUSE: 494 MS
QTC - MUSE: 497 MS
R AXIS - MUSE: 26 DEGREES
R AXIS - MUSE: 37 DEGREES
RBC # BLD AUTO: 5.05 10E6/UL (ref 4.4–5.9)
RBC URINE: 3 /HPF
SIROLIMUS BLD-MCNC: 5.3 UG/L (ref 5–15)
SODIUM SERPL-SCNC: 136 MMOL/L (ref 135–145)
SP GR UR STRIP: 1.02 (ref 1–1.03)
SPECIMEN TYPE: NORMAL
SYSTOLIC BLOOD PRESSURE - MUSE: NORMAL MMHG
SYSTOLIC BLOOD PRESSURE - MUSE: NORMAL MMHG
T AXIS - MUSE: 75 DEGREES
T AXIS - MUSE: 83 DEGREES
TME LAST DOSE: NORMAL H
TME LAST DOSE: NORMAL H
TRIGL SERPL-MCNC: 140 MG/DL
TSH SERPL DL<=0.005 MIU/L-ACNC: 0.41 UIU/ML (ref 0.3–4.2)
UROBILINOGEN UR STRIP-MCNC: NORMAL MG/DL
VENTRICULAR RATE- MUSE: 103 BPM
VENTRICULAR RATE- MUSE: 104 BPM
WBC # BLD AUTO: 3.5 10E3/UL (ref 4–11)
WBC URINE: 1 /HPF

## 2025-02-19 PROCEDURE — A9585 GADOBUTROL INJECTION: HCPCS | Performed by: STUDENT IN AN ORGANIZED HEALTH CARE EDUCATION/TRAINING PROGRAM

## 2025-02-19 PROCEDURE — 84443 ASSAY THYROID STIM HORMONE: CPT

## 2025-02-19 PROCEDURE — 99215 OFFICE O/P EST HI 40 MIN: CPT | Mod: 25 | Performed by: STUDENT IN AN ORGANIZED HEALTH CARE EDUCATION/TRAINING PROGRAM

## 2025-02-19 PROCEDURE — 82550 ASSAY OF CK (CPK): CPT

## 2025-02-19 PROCEDURE — 71046 X-RAY EXAM CHEST 2 VIEWS: CPT

## 2025-02-19 PROCEDURE — 80061 LIPID PANEL: CPT

## 2025-02-19 PROCEDURE — 75563 CARD MRI W/STRESS IMG & DYE: CPT | Mod: 26 | Performed by: INTERNAL MEDICINE

## 2025-02-19 PROCEDURE — 36415 COLL VENOUS BLD VENIPUNCTURE: CPT

## 2025-02-19 PROCEDURE — 83036 HEMOGLOBIN GLYCOSYLATED A1C: CPT

## 2025-02-19 PROCEDURE — 80195 ASSAY OF SIROLIMUS: CPT

## 2025-02-19 PROCEDURE — 82040 ASSAY OF SERUM ALBUMIN: CPT

## 2025-02-19 PROCEDURE — 999N000054 HC STATISTIC EKG NON-CHARGEABLE

## 2025-02-19 PROCEDURE — 93005 ELECTROCARDIOGRAM TRACING: CPT

## 2025-02-19 PROCEDURE — 84100 ASSAY OF PHOSPHORUS: CPT

## 2025-02-19 PROCEDURE — 75563 CARD MRI W/STRESS IMG & DYE: CPT

## 2025-02-19 PROCEDURE — 93018 CV STRESS TEST I&R ONLY: CPT | Performed by: INTERNAL MEDICINE

## 2025-02-19 PROCEDURE — 71046 X-RAY EXAM CHEST 2 VIEWS: CPT | Mod: 26 | Performed by: RADIOLOGY

## 2025-02-19 PROCEDURE — 85018 HEMOGLOBIN: CPT

## 2025-02-19 PROCEDURE — 99213 OFFICE O/P EST LOW 20 MIN: CPT | Performed by: STUDENT IN AN ORGANIZED HEALTH CARE EDUCATION/TRAINING PROGRAM

## 2025-02-19 PROCEDURE — 86352 CELL FUNCTION ASSAY W/STIM: CPT

## 2025-02-19 PROCEDURE — G0103 PSA SCREENING: HCPCS

## 2025-02-19 PROCEDURE — 250N000011 HC RX IP 250 OP 636: Performed by: STUDENT IN AN ORGANIZED HEALTH CARE EDUCATION/TRAINING PROGRAM

## 2025-02-19 PROCEDURE — 85004 AUTOMATED DIFF WBC COUNT: CPT

## 2025-02-19 PROCEDURE — 93016 CV STRESS TEST SUPVJ ONLY: CPT | Performed by: INTERNAL MEDICINE

## 2025-02-19 PROCEDURE — 83735 ASSAY OF MAGNESIUM: CPT

## 2025-02-19 PROCEDURE — 87799 DETECT AGENT NOS DNA QUANT: CPT

## 2025-02-19 PROCEDURE — 255N000002 HC RX 255 OP 636: Performed by: STUDENT IN AN ORGANIZED HEALTH CARE EDUCATION/TRAINING PROGRAM

## 2025-02-19 PROCEDURE — 81003 URINALYSIS AUTO W/O SCOPE: CPT

## 2025-02-19 RX ORDER — LORAZEPAM 0.5 MG/1
0.5 TABLET ORAL EVERY 30 MIN PRN
Status: DISCONTINUED | OUTPATIENT
Start: 2025-02-19 | End: 2025-02-20 | Stop reason: HOSPADM

## 2025-02-19 RX ORDER — ALBUTEROL SULFATE 90 UG/1
2 INHALANT RESPIRATORY (INHALATION) EVERY 5 MIN PRN
Status: DISCONTINUED | OUTPATIENT
Start: 2025-02-19 | End: 2025-02-20 | Stop reason: HOSPADM

## 2025-02-19 RX ORDER — REGADENOSON 0.08 MG/ML
0.4 INJECTION, SOLUTION INTRAVENOUS ONCE
Status: COMPLETED | OUTPATIENT
Start: 2025-02-19 | End: 2025-02-19

## 2025-02-19 RX ORDER — LORAZEPAM 2 MG/ML
0.5 INJECTION INTRAMUSCULAR
Status: DISCONTINUED | OUTPATIENT
Start: 2025-02-19 | End: 2025-02-20 | Stop reason: HOSPADM

## 2025-02-19 RX ORDER — LIDOCAINE 40 MG/G
CREAM TOPICAL
Status: DISCONTINUED | OUTPATIENT
Start: 2025-02-19 | End: 2025-02-20 | Stop reason: HOSPADM

## 2025-02-19 RX ORDER — DIAZEPAM 10 MG/2ML
5 INJECTION, SOLUTION INTRAMUSCULAR; INTRAVENOUS
Status: DISCONTINUED | OUTPATIENT
Start: 2025-02-19 | End: 2025-02-20 | Stop reason: HOSPADM

## 2025-02-19 RX ORDER — GADOBUTROL 604.72 MG/ML
20 INJECTION INTRAVENOUS ONCE
Status: COMPLETED | OUTPATIENT
Start: 2025-02-19 | End: 2025-02-19

## 2025-02-19 RX ORDER — LOSARTAN POTASSIUM 100 MG/1
100 TABLET ORAL DAILY
Qty: 90 TABLET | Refills: 3 | Status: SHIPPED | OUTPATIENT
Start: 2025-02-19

## 2025-02-19 RX ORDER — CAFFEINE 200 MG
200 TABLET ORAL
OUTPATIENT
Start: 2025-02-19 | End: 2025-02-19

## 2025-02-19 RX ORDER — DIAZEPAM 5 MG/1
5 TABLET ORAL EVERY 30 MIN PRN
Status: DISCONTINUED | OUTPATIENT
Start: 2025-02-19 | End: 2025-02-20 | Stop reason: HOSPADM

## 2025-02-19 RX ORDER — CAFFEINE CITRATE 20 MG/ML
60 SOLUTION INTRAVENOUS
OUTPATIENT
Start: 2025-02-19 | End: 2025-02-19

## 2025-02-19 RX ORDER — AMINOPHYLLINE 25 MG/ML
50-100 INJECTION, SOLUTION INTRAVENOUS
Status: COMPLETED | OUTPATIENT
Start: 2025-02-19 | End: 2025-02-19

## 2025-02-19 RX ADMIN — GADOBUTROL 20 ML: 604.72 INJECTION INTRAVENOUS at 10:08

## 2025-02-19 RX ADMIN — REGADENOSON 0.4 MG: 0.4 INJECTION INTRAVENOUS at 09:38

## 2025-02-19 RX ADMIN — AMINOPHYLLINE 100 MG: 25 INJECTION, SOLUTION INTRAVENOUS at 09:41

## 2025-02-19 ASSESSMENT — PAIN SCALES - GENERAL: PAINLEVEL_OUTOF10: NO PAIN (0)

## 2025-02-19 NOTE — PATIENT INSTRUCTIONS
Patient Instructions   1. Increase your Losartan to 100mg daily.  2. Recheck your labs in about two weeks (BMP).  3. We will place a referral to the lipid clinic- they will call you to schedule.  4. Jocelynn will call with any remaining lab/testing results.  5. Please get your Shingrix vaccine this Spring (2 part series).  6. Please schedule your endoscopies this Spring.  7. If BP remains greater than 140/90 after Losartan increase, let Jocelynn know.    Next transplant clinic appointment:  in one year for 7th annual (angiogram).  Next lab draw: in two wieeks  Coordinator will call with all pending results.     Please call transplant coordinator with any questions:   Keiko Kapoor RN   Post-Heart Transplant Coordinator  [phone]

## 2025-02-19 NOTE — NURSING NOTE
Chief Complaint   Patient presents with    Follow Up     RETURN HEART TRANSPLANT       Vitals were taken, medications reconciled.    RANDELL Butterfield    10:50 AM

## 2025-02-19 NOTE — LETTER
2/19/2025      RE: Everton Larios  [address] [id number]       Dear Colleague,    Thank you for the opportunity to participate in the care of your patient, Everton Larios, at the [facility] [place] at [facility]. Please see a copy of my visit note below.    Advanced Heart Failure/Transplant Clinic    HPI:  Everton Larios is a 59 year old male with a history of ASD (s/p repair in childhood), paroxsymal AFib/AF (s/p ablation), NICM, diastolic dysfunction, alcohol abuse, Pierce's esophagus, ulcerative colitis, GIB (s/p splenic embolization), and hypothyroidism, now s/p OHT and bypass of persistent left SVC to right atrial appendage 2/24/19 who presents to clinic for ongoing evaluation and management.     His postoperative course was c/b shock due to RV dysfunction requiring IABP support, small pneumoperitoneum (clinically insignificant), chest wall hematoma (former ICD site, s/p evacuation and drain placement 3/31/19), HCAP/klebsiella pneumonia, and FRANKLIN (required short-term HD, now recovered).  His biopsy 3/25/19 showed mild AMR1 with some staining for c4d. Repeat biopsy 3/28 showed improved AMR and less reactive capillary staining, and subsequent biopsies have now shown resolution of AMR and improved capillary staining.  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 Given concerns for MMF colitis, patient was switched to Imuran in March 2022    Today patient reports he is improving after recent admission for pneumonia. His breathing has improved, but he still has a mild cough. He denies any chest pain or pressure, orthopnea, PND, palpitations, syncope/presyncope or LE edema. He denies productive cough, abdominal pain, or N/V/D. Patient has chronic sinus issues. He also denies any problems with  his medications and reports compliance.    ROS:  A complete 12-point ROS was negative except as above.    Past Medical History:   Diagnosis Date     Alcohol abuse      Arthritis     hands, neck     ASD (atrial septal defect)     s/p repair age 3     Asthma      Atrial fibrillation (H)      Pierce's esophagus 10/4/2018     Pierce's esophagus      Cataract      CHF (congestive heart failure) (H)      Chronic rhinitis 10/4/2018     Chronic systolic heart failure (H) 10/4/2018     Clotting disorder      Congenital cardiomyopathy in  (H)      Depression 10/4/2018     Depression      Diastolic dysfunction      DJD (degenerative joint disease)     neck     DJD (degenerative joint disease) - neck 10/4/2018     H/O congenital atrial septal defect (ASD) repair at age 3 10/4/2018     History of anesthesia complications     nausea     History of transfusion      Hypothyroidism      Hypothyroidism due to medication 10/4/2018     ICD (implantable cardioverter-defibrillator) in place      ICD, [email or web address] ; gen change 2018 10/4/2018     Intermittent asthma without complication 10/4/2018     Nonischemic cardiomyopathy (H) 10/4/2018     On amiodarone therapy 10/4/2018     Pacemaker      Paroxysmal atrial fibrillation (H) 10/4/2018     S/P ablation of atrial fibrillation 10/4/2018     Systolic heart failure (H)      Ulcerative colitis (H)      Ulcerative colitis (H)      Ventricular tachycardia (H) 10/4/2018     Ventricular tachycardia (H)        Past Surgical History:   Procedure Laterality Date     ABLATION OF DYSRHYTHMIC FOCUS      for A fib     AICD, DUAL CHAMBER       ASD REPAIR  1968     BRONCHOSCOPY (RIGID OR FLEXIBLE), DIAGNOSTIC N/A 2019    Procedure: BRONCHOSCOPY, WITH BAL;  Surgeon: Margot Chiang MD;  Location:  GI     CARDIAC DEFIBRILLATOR PLACEMENT      x2--last was 2018     COLONOSCOPY N/A 2020    Procedure: COLONOSCOPY, WITH POLYPECTOMY AND BIOPSY;  Surgeon: Carlos  Ranjeet Hidalgo MD;  Location:  GI     COLONOSCOPY N/A 02/05/2015    Procedure: COLONOSCOPY with biopsy;  Surgeon: Fernie Akers MD;  Location: [facility];  Service:      COLONOSCOPY N/A 12/19/2017    Procedure: COLONOSCOPY with biopsies and polypectomies using snare;  Surgeon: Gael Romero MD;  Location: [facility];  Service:      COLONOSCOPY N/A 3/8/2022    Procedure: COLONOSCOPY, WITH POLYPECTOMY AND BIOPSY;  Surgeon: Paddy Saldivar DO;  Location:  GI     CV CORONARY ANGIOGRAM N/A 02/12/2020    Procedure: CV CORONARY ANGIOGRAM;  Surgeon: Isiah Mcallister MD;  Location:  HEART CARDIAC CATH LAB     CV CORONARY ANGIOGRAM N/A 03/17/2021    Procedure: CV CORONARY ANGIOGRAM;  Surgeon: Santino Mckeon MD;  [facility]     CV CORONARY ANGIOGRAM N/A 2/23/2022    Procedure: CV CORONARY ANGIOGRAM;  Surgeon: Yves Rodrigues MD;  Location: [facility] HE[facility] CARDIAC CATH LAB     CV CORONARY ANGIOGRAM N/A 2/23/2023    Procedure: Coronary Angiogram;  Surgeon: Santino Mckeon MD;  [facility]: [facility]ARDIAC CATH LAB     CV HEART BIOPSY N/A 03/04/2019    Procedure: Heart Biopsy;  Surgeon: Abhijeet Titus MD;  Location: [facility]ARDIAC CATH LAB     CV HEART BIOPSY N/A 03/25/2019    Procedure: Heart Biopsy;  Surgeon: Yves Rodrigues MD;  [facility]: [facility]ARD[facility]ATH [facility]     CV HEART BIOPSY N/A 03/28/2019    Procedure: Heart Cath Heart Biopsy;  Surgeon: Yves Rodrigues MD;  [facility]     CV HEART BIOPSY N/A 04/10/2019    Procedure: CV HEART BIOPSY;  Surgeon: Isiah Mcallister MD;  [facility]     CV HEART BIOPSY N/A 04/24/2019    Procedure: CV HEART BIOPSY;  Surgeon: Isiah Mcallister MD;  [facility]: [facility] CARD[facility] CATH LAB     CV HEART BIOPSY N/A 05/08/2019    Procedure: CV HEART BIOPSY;  Surgeon: Isiah Mcallister MD;  [facility]     CV HEART BIOPSY N/A 06/04/2019    Procedure: CV  HEART BIOPSY;  Surgeon: Alton Solomon MD;  [facility]: [facility] C[facility] LAB     CV HEART BIOPSY N/A 05/22/2019    Procedure: CV HEART BIOPSY;  Surgeon: Yves Rodrigues MD;  Location: [facility] CARDIAC CATH LAB     CV HEART BIOPSY N/A 07/17/2019    Procedure: CV HEART BIOPSY;  Surgeon: Isiah Mcallister MD;  Location:  HEART CARDIAC CATH LAB     CV HEART BIOPSY N/A 08/13/2019    Procedure: CV HEART BIOPSY;  Surgeon: Jackson Patten MD;  Location:  HEART CARDIAC CATH LAB     CV HEART BIOPSY N/A 10/15/2019    Procedure: CV HEART BIOPSY;  Surgeon: Santino Mckeon MD;  Location:  HEART CARDIAC CATH LAB     CV HEART BIOPSY N/A 02/12/2020    Procedure: CV HEART BIOPSY;  Surgeon: Isiah Mcallister MD;  Location:  HEART CARDIAC CATH LAB     CV HEART BIOPSY N/A 05/05/2020    Procedure: CV HEART BIOPSY;  Surgeon: Yves Rodrigues MD;  Location:  HEART CARDIAC CATH LAB     CV HEART BIOPSY N/A 03/17/2021    Procedure: CV HEART BIOPSY;  Surgeon: Santino Mckeon MD;  Location:  HEART CARDIAC CATH LAB     CV HEART BIOPSY N/A 5/10/2022    Procedure: Heart Biopsy;  Surgeon: Yves Rodrigues MD;  Location:  HEART CARDIAC CATH LAB     CV INTRA AORTIC BALLOON N/A 02/18/2019    Procedure: Intra-Aortic Balloon;  Surgeon: Alton Solomon MD;  Location:  HEART CARDIAC CATH LAB     CV INTRA AORTIC BALLOON N/A 02/20/2019    Procedure: Replace subclavian IABP;  Surgeon: Yves Rodrigues MD;  Location:  HEART CARDIAC CATH LAB     CV INTRAVASULAR ULTRASOUND N/A 02/12/2020    Procedure: CV INTRAVASCULAR ULTRASOUND;  Surgeon: Isiah Mcallister MD;  Location:  HEART CARDIAC CATH LAB     CV LEFT HEART CATH N/A 03/19/2019    Procedure: Left Heart Cath;  Surgeon: Yves Rodrigues MD;  Location:  HEART CARDIAC CATH LAB     CV LEFT HEART CATH N/A 02/12/2020    Procedure: Left Heart Cath;  Surgeon: Isiah Mcallister MD;  Location:  HEART CARDIAC CATH LAB     CV MYOCARDIAL  BIOPSY N/A 03/19/2019    Procedure: RHC/HBx - Femoral access for 3/19 per Nimisha GONZALEZ;  Surgeon: Yves Rodrigues MD;  [facility] C[facility]     CV MYOCARDIAL BIOPSY N/A 03/11/2019    Procedure: ADD ON RHC/HBX;  Surgeon: Alton Solomon MD;  [facility]     CV RIGHT HEART CATH MEASUREMENTS RECORDED N/A 03/25/2019    Procedure: Right Heart Cath;  Surgeon: Yves Rodrigues MD;  [facility]     CV RIGHT HEART CATH MEASUREMENTS RECORDED N/A 03/28/2019    Procedure: Heart Cath Right Heart Cath;  Surgeon: Yves Rodrigues MD;  [facility]     CV RIGHT HEART CATH MEASUREMENTS RECORDED N/A 04/24/2019    Procedure: CV RIGHT HEART CATH;  Surgeon: Isiah Mcallister MD;  [facility]     CV RIGHT HEART CATH MEASUREMENTS RECORDED N/A 06/04/2019    Procedure: CV RIGHT HEART CATH;  Surgeon: Alton Solomon MD;  [facility]     CV RIGHT HEART CATH MEASUREMENTS RECORDED N/A 05/22/2019    Procedure: CV RIGHT HEART CATH;  Surgeon: Yves Rodrigues MD;  [facility]     CV RIGHT HEART CATH MEASUREMENTS RECORDED N/A 08/13/2019    Procedure: CV RIGHT HEART CATH;  Surgeon: Jackson Patten MD;  [facility]     CV RIGHT HEART CATH MEASUREMENTS RECORDED N/A 10/15/2019    Procedure: CV RIGHT HEART CATH;  Surgeon: Santino Mckeon MD;  [facility]: [facility]     CV RIGHT HEART CATH MEASUREMENTS RECORDED N/A 02/12/2020    Procedure: CV RIGHT HEART CATH;  Surgeon: Isiah Mcallister MD;  [facility]     CV RIGHT HEART CATH MEASUREMENTS RECORDED N/A 03/17/2021    Procedure: CV RIGHT HEART CATH;  Surgeon: Santino Mckeon MD;  [facility]     CV RIGHT HEART CATH MEASUREMENTS RECORDED N/A 2/23/2022    Procedure: CV RIGHT HEART CATH;  Surgeon: Yves Rodrigues MD;   Location:  HEART CARDIAC CATH LAB     CV RIGHT HEART CATH MEASUREMENTS RECORDED N/A 5/10/2022    Procedure: Right Heart Catheterization;  Surgeon: Yves Rodrigues MD;  Location:  HEART CARDIAC CATH LAB     CV RIGHT HEART CATH MEASUREMENTS RECORDED N/A 2/23/2023    Procedure: Right Heart Catheterization;  Surgeon: Santino Mckeon MD;  Location:  HEART CARDIAC CATH LAB     ESOPHAGOSCOPY, GASTROSCOPY, DUODENOSCOPY (EGD), COMBINED N/A 12/19/2017    Procedure: ESOPHAGOGASTRODUODENOSCOPY (EGD) with biopsies;  Surgeon: Gael Romero MD;  Location: [facility];  Service:      ESOPHAGOSCOPY, GASTROSCOPY, DUODENOSCOPY (EGD), COMBINED N/A 3/8/2022    Procedure: ESOPHAGOGASTRODUODENOSCOPY, WITH BIOPSY;  Surgeon: Paddy Saldivar DO;  Location:  GI     H STATISTIC PICC LINE INSERTION >5YR, FAILED Bilateral 10/28/2021    L sided SVC     HAND SURGERY Left      HC REVISE MEDIAN N/CARPAL TUNNEL SURG  09/21/2016    Procedure: OPEN RIGHT CARPAL TUNNEL RELEASE CORTISONE INJECTION RIGHT THUMB;  Surgeon: Duong Santoyo MD;  Location: [facility] OR;  Service: Orthopedics     INCISION AND DRAINAGE CHEST WASHOUT, COMBINED N/A 03/21/2019    Procedure: Incision And Drainage; Evacuation Right Chest Wall Hematoma;  Surgeon: Dewayne House MD;  [facility]     INSERT INTRAAORTIC BALLOON PUMP Left 02/19/2019    Procedure: Insert left  Subclavian Balloon Pump,  Removal Right femoral arterial balloom pump sheath;  Surgeon: Dewayne House MD;  [facility]     INSERT INTRAAORTIC BALLOON PUMP Left 02/21/2019    Procedure: SUBCLAVIAN BALLOON PUMP PLACEMENT;  Surgeon: Ben White MD;  [facility]     INSERT INTRACORONARY STENT      x2     IR PICC PLACEMENT > 5 YRS OF AGE  05/08/2019     IR PICC PLACEMENT > 5 YRS OF AGE  9/21/2024     TRANSPLANT HEART RECIPIENT N/A 02/24/2019    Procedure: TRANSPLANT HEART RECIPIENT;  Surgeon: Dewayne House MD;  [facility]       Family History    Problem Relation Age of Onset     Skin Cancer Mother      Endometrial Cancer Mother      Osteoporosis Mother      Hypertension Father      Endometrial Cancer Maternal Grandmother      Hip fracture No family hx of      Nephrolithiasis No family hx of        Social History     Tobacco Use     Smoking status: Former     Current packs/day: 0.00     Average packs/day: 1 pack/day for 28.8 years (28.8 ttl pk-yrs)     Types: Cigarettes     Start date: 1980     Quit date: 2008     Years since quittin.2     Passive exposure: Past     Smokeless tobacco: Never   Substance Use Topics     Alcohol use: Yes     Alcohol/week: 1.0 - 2.0 standard drink of alcohol     Types: 1 - 2 Cans of beer per week       MEDICATIONS:  Current Outpatient Medications   Medication Sig Dispense Refill     acetaminophen (TYLENOL) 325 MG tablet Take 2 tablets (650 mg) by mouth every 4 hours as needed for mild pain       albuterol (PROAIR HFA/PROVENTIL HFA/VENTOLIN HFA) 108 (90 Base) MCG/ACT inhaler INHALE 2 PUFFS INTO THE LUNGS EVERY 6 HOURS AS NEEDED FOR SHORTNESS OF BREATH OR WHEEZING. 8.5 g 5     amLODIPine (NORVASC) 5 MG tablet Take 2 tablets (10 mg) by mouth daily. 180 tablet 11     aspirin (ASA) 81 MG chewable tablet Take 1 tablet (81 mg) by mouth daily       azaTHIOprine 75 MG TABS Take 75 mg by mouth daily. PLEASE CONFIRM THE DOSE WITH YOUR TRANSPLANT TEAM 90 tablet 3     calcium carbonate (OS-RADAMES) 500 MG tablet Take 1 tablet by mouth daily.       cetirizine (ZYRTEC) 10 MG tablet Take 10 mg by mouth daily       clindamycin (CLINDAMAX) 1 % external gel Apply twice daily as needed for acne on the spots 60 g 11     DULoxetine (CYMBALTA) 20 MG capsule TAKE 1 CAPSULE BY MOUTH EVERY DAY 90 capsule 3     ferrous sulfate (FE TABS) 325 (65 Fe) MG EC tablet 325 mg.       fluticasone (FLONASE) 50 MCG/ACT nasal spray Spray 2 sprays into both nostrils daily.       fluticasone (FLOVENT HFA) 220 MCG/ACT inhaler INHALE 2 PUFFS INTO THE LUNGS  TWICE A DAY 12 g 3     Fluticasone Propionate, Inhal, (FLUTICASONE PROPIONATE DISKUS) 100 MCG/ACT AEPB INHALE 1 PUFF INTO THE LUNGS EVERY 12 HOURS. 60 each 3     levothyroxine (SYNTHROID/LEVOTHROID) 75 MCG tablet TAKE 1 TABLET BY MOUTH EVERY DAY 90 tablet 2     losartan (COZAAR) 100 MG tablet Take 1 tablet (100 mg) by mouth daily. 90 tablet 3     melatonin 3 MG tablet Take 3 mg by mouth nightly as needed for sleep.       mometasone furoate (ASMANEX HFA) 100 MCG/ACT inhaler INHALE 2 PUFFS INTO THE LUNGS TWICE A DAY 13 g 3     multivitamin w/minerals (THERA-VIT-M) tablet Take 1 tablet by mouth daily       omeprazole (PRILOSEC) 40 MG DR capsule TAKE 1 CAPSULE BY MOUTH EVERY DAY 90 capsule 4     RESTASIS 0.05 % ophthalmic emulsion Place 1 drop into both eyes 2 times daily as needed for dry eyes.       rosuvastatin (CRESTOR) 40 MG tablet Take 1 tablet (40 mg) by mouth daily 90 tablet 3     sildenafil (VIAGRA) 50 MG tablet Take 1 tablet (50 mg) by mouth daily as needed (Erectile dysfunction) 10 tablet 11     sirolimus (GENERIC EQUIVALENT) 0.5 MG tablet Take 1 tablet (0.5 mg) by mouth daily. Total dose 1.5 mg daily. 90 tablet 3     sirolimus (GENERIC EQUIVALENT) 1 MG tablet Take 1 tablet (1 mg) by mouth daily. Total dose 1.5 mg daily. 90 tablet 3     tadalafil (CIALIS) 10 MG tablet Take 1 tablet (10 mg) by mouth daily as needed (Erectile dysfunction) 10 tablet 3       EXAM:   BP (!) 143/91 (BP Location: Right arm, Patient Position: Chair, Cuff Size: Adult Large)   Pulse 111   Wt 92.1 kg (203 lb)   SpO2 99%   BMI 30.87 kg/m    General: appears comfortable, alert and interactive, in no acute distress  Head: normocephalic, atraumatic, mild swelling along right cheek and bottom lip  Eyes: anicteric sclera, EOMI  Mouth: teeth intact, no tenderness or sores in the mouth  Neck: supple, no cervical adenopathy  CV: regular rate and rhythm, S1/S2, no murmur, gallop, rub, estimated JVP ~7 cm  Resp: clear, no rales or  wheezing  GI: soft, nontender, nondistended, +BS  Extremities: warm, no peripheral edema, 2+ bilateral radial pulses  Neurological: normal speech and affect, no gross motor deficits  Psych: normal mood and affect  Derm: no rashes or lesions on exposed surfaces    Wt Readings from Last 4 Encounters:   02/19/25 92.1 kg (203 lb)   01/27/25 90.7 kg (200 lb)   10/15/24 86.2 kg (190 lb)   10/02/24 86.6 kg (191 lb)      I personally reviewed recent labs and data as below and discussed the results with the patient in clinic today.  Labs:  CBC RESULTS:  Lab Results   Component Value Date    WBC 3.5 (L) 02/19/2025    WBC 5.0 04/09/2021    RBC 5.05 02/19/2025    RBC 4.61 04/09/2021    HGB 14.1 02/19/2025    HGB 11.2 (A) 04/09/2021    HCT 42.1 02/19/2025    HCT 34.6 04/09/2021    MCV 83 02/19/2025    MCV 75 04/09/2021    MCH 27.9 02/19/2025    MCH 24.3 04/09/2021    MCHC 33.5 02/19/2025    MCHC 32.4 04/09/2021    RDW 14.4 02/19/2025    RDW 14.9 04/09/2021     02/19/2025     04/09/2021       CMP RESULTS:  Lab Results   Component Value Date     02/19/2025     03/17/2021    POTASSIUM 4.8 02/19/2025    POTASSIUM 4.4 05/28/2022    POTASSIUM 3.9 03/17/2021    CHLORIDE 101 02/19/2025    CHLORIDE 107 05/28/2022    CHLORIDE 106 03/17/2021    CO2 19 (L) 02/19/2025    CO2 24 05/28/2022    CO2 24 03/17/2021    ANIONGAP 16 (H) 02/19/2025    ANIONGAP 6 05/28/2022    ANIONGAP 8 03/17/2021    GLC 94 02/19/2025    GLC 97 05/28/2022    GLC 85 03/17/2021    BUN 34.8 (H) 02/19/2025    BUN 31 (H) 05/28/2022    BUN 32 (H) 03/17/2021    CR 2.07 (H) 02/19/2025    CR 1.80 (H) 03/17/2021    GFRESTIMATED 36 (L) 02/19/2025    GFRESTIMATED 34 (L) 06/22/2021    GFRESTIMATED 41 (L) 03/17/2021    GFRESTBLACK 41 (L) 06/22/2021    GFRESTBLACK 47 (L) 03/17/2021    RADAMES 9.6 02/19/2025    RADAMES 9.0 03/17/2021    BILITOTAL 0.4 02/19/2025    BILITOTAL 0.5 03/17/2021    ALBUMIN 4.2 02/19/2025    ALBUMIN 3.2 (L) 02/23/2022    ALBUMIN 3.6  03/17/2021    ALKPHOS 116 02/19/2025    ALKPHOS 131 03/17/2021    ALT 27 02/19/2025    ALT 49 03/17/2021    AST 32 02/19/2025    AST 48 (H) 03/17/2021        INR RESULTS:  Lab Results   Component Value Date    INR 1.17 (H) 09/23/2019       Lab Results   Component Value Date    MAG 2.0 02/19/2025    MAG 1.6 03/17/2021     Lab Results   Component Value Date    NTBNPI 1,445 (H) 09/20/2024    NTBNPI 767 09/23/2019     Lab Results   Component Value Date    NTBNP 10,986 (H) 11/15/2018     cMRI 2/19/25  SUMMARY  ===================================================================  Clinical history: 59-year-old man with a heart transplant on 02/24/2019.  Comparison CMR: 04/18/2024  1. The LV is normal in cavity size and wall thickness. The global systolic function is normal. The LVEF is 56%. There are no regional wall motion abnormalities.  2. The RV is normal in cavity size. The global systolic function is normal. The RVEF is 66%.  3. Both atria are severely dilated in size.  4. There is no significant valvular disease.   5. Late gadolinium enhancement imaging shows no myocardial infarction, replacement fibrosis, or infiltration.  6. Regadenoson stress perfusion imaging shows no ischemia.  7. There is no pericardial effusion or thickening.  8. There is no intracardiac thrombus.  CONCLUSIONS: Orthotopic heart transplant recipient with LVEF of 56% and RVEF 66% without any LGE. Compared to the prior CMR dated 04/18/2024, there have been no significant changes.    TTE 9/21/24  Interpretation Summary  Left ventricular size, wall motion and function are normal. The ejection  fraction is 60-65%.  Mildly decreased right ventricular systolic function  The rhythm was sinus tachycardia.  Right ventricular systolic pressure could not be approximated due to  inadequate tricuspid regurgitation.  No hemodynamically significant valvular abnormalities on 2D or color flow imaging.    cMRI 4/18/24  SUMMARY    ===================================================================  Clinical history: 59-year old male with a history of orthotopic heart transplantation in 2019. His last coronary angiogram in 02/2023 was devoid of coronary allograft vasculopathy. CMR for graft surveillance.  Comparison CMR: 02/23/2022  1. The LV is normal in cavity size and wall thickness. The global systolic function is normal. The LVEF is 54%. There are no regional wall motion abnormalities.  2. The RV is normal in cavity size. The global systolic function is normal. The RVEF is 54%.   3. The left atrium is mildly enlarged due to transplantation. The right atrium is normal in size.   4. There is no significant valvular disease.   5. Late gadolinium enhancement imaging shows a small burden of patchy, midmyocardial fibrosis in the basal inferoseptal segment. This is of unclear significance.  6. Regadenoson stress perfusion imaging shows no ischemia.  7.  There is no pericardial effusion or thickening.  8. There is no intracardiac thrombus.  CONCLUSIONS: Orthotopic heart transplant. Normal biventricular graft function without ischemia. there is a small burden of fibrosis of unclear significance. Upon direct visual comparison to the prior examinations dated 03/17/2021 and 02/23/2022, there are no significant changes in the graft function or the degree of fibrosis.     TTE 2/23/23  Interpretation Summary  Left ventricular size, wall motion and function are normal. The ejection  fraction is 55-60%.  On direct comparison, the global right ventricular function appears mildly  reduced compared to 2022, but TAPSE and S' values are similar.  No significant valve abnormalities.  The inferior vena cava is normal.  No pericardial effusion.    Coronary Angiogram/RHC 2/23/23  Coronary Findings    Diagnostic  Dominance: Right  Left Main   The vessel is large. There was 0% vessel disease.      Left Anterior Descending   The vessel is large. There was 0% vessel  disease.      Left Circumflex   The vessel is moderate in size. There was 0% vessel disease.      First Obtuse Marginal Branch   The vessel is large.      Right Coronary Artery   The vessel is large. There was 0% vessel disease.         Intervention     No interventions have been documented.     Hemodynamics    Right Heart Catheterization:  /89/114 mmHg  HR 92 BPM    RA 7/11/7 mmHg  RV 40/7 mmHg  PA 40/19/28 mmHg  PCW 17/24/17 mmHg  Jaden CO 7.13 L/min Normal = 4.0-8.0 L/min  Jaden CI 3.64 L/min/m2 Normal = 2.5-4.0 L/min/m2  TD CO 6.93 L/min Normal = 4.0-8.0 L/min  TD CI 3.54 L/min/m2 Normal = 2.5-4.0 L/min/m2  PA sat 72.4%   Hgb 10.7 g/dL   PVR 1.71 Woods units  SVR 1021 dynes-sec/cm5     ECG 2/23/23 shows sinus rhythm with nonspecific intraventricular conduction block, no acute ST-T changes    C 2/23/22  RA: 10  RV: 31 (11)  PA: 30/14 (21)  PVR: 0.91  Jaden CO: 6.3    CI: 3.3  TDCO: 7.4       CI: 3.8     Angiogram 2/23/22  Left Main   The vessel is large. There was 0% vessel disease.   Left Anterior Descending   The vessel is large. There was 0% vessel disease.   Left Circumflex   The vessel is moderate in size. There was 0% vessel disease.   Right Coronary Artery   The vessel is large. There was 0% vessel disease.     CMR 2/23/22  Clinical history: 56-year-old man post orthotopic heart transplantation in 2019  Comparison CMR: March 2021  1. The left ventricle is normal in cavity size and wall thickness. There are no regional wall motion  abnormalities. The global systolic function is normal. The LVEF is 55%.  2. The right ventricle is normal in cavity size. The global systolic function is normal. The RVEF is 54%.   3. The left atrium is enlarged due to transplantation and the right atrium is normal in size.  4. There is no significant valvular disease.   5. There is patchy midmyocardial late gadolinium enhancement at the basal-mid RV insertion sites consistent with non ischemic fibrosis.    6. There is no  ischemia on stress perfusion imaging.  7. There is no pericardial effusion.  8. There is no intracardiac thrombus.  CONCLUSIONS:   Post heart transplantation.  Normal biventricular size and function, LVEF 55 % and RVEF 54%.   No evidence of myocardial ischemia.  No significant changes when compared to prior study from 2021    Echo 8/31/21  Interpretation Summary  Global and regional left ventricular function is normal with an EF of 60-65%.  Global right ventricular function is normal.  Pulmonary artery systolic pressure is normal.  The inferior vena cava was normal in size with preserved respiratory variability.  No pericardial effusion is present.    RHC and angiography 2/12/2020  Pressures Phase   Time  Systolic  Diastolic  Mean  A Wave  V Wave  EDP  Max dp/dt  HR    RA Pressures  10:37 AM    7 mmHg     9 mmHg     7 mmHg       95 bpm       RV Pressures  10:37 AM  30 mmHg         9 mmHg      95 bpm       PA Pressures  10:38 AM  30 mmHg     16 mmHg     20 mmHg         94 bpm       PCW Pressures  10:38 AM    12 mmHg     13 mmHg     13 mmHg       95 bpm       AO Pressures  11:00 AM  94 mmHg     72 mmHg     83 mmHg         98 bpm       Art Pressures  10:59 AM  120 mmHg     74 mmHg     91 mmHg         103 bpm       LV Pressures  10:59 AM  120 mmHg         17 mmHg      102 bpm          Time  Hb  SAT(%)  PO2  Content  PA Sat    PA  10:21 AM   71.5 %       71.5 %       Art  10:21 AM   100 %      14.28 mL/dL        Cardiac Output    Time  TDCO  TDCI  Jaden C.O.  Jaden C.I.  Jaden HR    Cardiac Output Results  10:21 AM  6.77 L/min     3.74 L/min/m2     5.68 L/min     3.13 L/min/m2            Left Main    The vessel was visualized by selective angiography and is large. There was 0% vessel disease.    Left Anterior Descending    The vessel was visualized by selective angiography and is large. There was 0% vessel disease. IVUS was performed on the LMCA and LAD vessels. The LMCA was engaged with a 6 Fr Ikari LF 3.5 GC and the  patient was anticoagulated with IV UFH. A BMW was passed into the distal LAD without difficulty. The [facility] IVUS was passed over the wire and manually pulled back while recording. Proximal LAD HERACLIO 0.3 Lumen 24.1 Vessel 29.8 19% area stenosis Mid LAD HERACLIO 0.3 Lumen 10.8 Vessel 13.7 21% area stenosis IVUS was performed on the vessel. Ultrasound supply: [facility] IVUS SHRT.    Left Circumflex    The vessel was visualized by selective angiography and is moderate in size. There was 0% vessel disease.    Right Coronary Artery    The vessel was visualized by selective angiography and is large. There was 0% vessel disease.    Conclusion  Mild intimal hyperplasia with up to 0.3 mm HERACLIO and 20% narrowing by area.    ECHO 12/18/2019  Interpretation Summary  Left ventricular function, chamber size, wall motion, and wall thickness are  normal.The EF is 60-65%.  Right ventricular function, chamber size, wall motion, and thickness are  normal.  No significant valvular abnormalities were noted.  Pulmonary artery systolic pressure is normal.  The inferior vena cava was normal in size with preserved respiratory  variability.  No pericardial effusion is present.      RHC and EMB 10/15/19:  Conclusion     Right sided filling pressures are normal.  Left sided filling pressures are normal.  Normal PA pressures.  Normal cardiac output level.  Successful collection of endomyocardial biopsies          Plan       Follow bedrest per protocol    Continued medical management and lifestyle modifications for cardiovascular risk factor optimizations.    Discharge today per protocol   Hemodynamics     Right Heart Pressures     Right sided filling pressures are normal.Left sided filling pressures are normal. Normal PA pressures.Normal cardiac output level.   Heart Biospy     Heart Biopsy     After informed consent patient was prepped and draped in the usual fashion. Under fluoroscopy guidance a 7 Fr angeled sheath was placed into the  right internal jugular vein after local anesthesia with 1.0% lidocaine. 5.5 [place]z bioptome was passed through the sheath to the right ventricular septum and 5 biopsies were obtained. The biopsy specimens were sent to Pathology for examination. The sheath was removed and hemostasis was obtained. The patient tolerated the procedure well and there were no complications.   Pressures Phase: Baseline      Time Systolic Diastolic Mean A Wave V Wave EDP Max dp/dt HR   RA Pressures 11:49 AM   2 mmHg    3 mmHg    4 mmHg      105 bpm      RV Pressures 11:35 AM       1776 mmHg/sec        11:49 AM 22 mmHg        4 mmHg     105 bpm      PA Pressures 11:50 AM 20 mmHg    10 mmHg    14 mmHg        105 bpm      PCW Pressures 11:49 AM   6 mmHg    7 mmHg    7 mmHg      105 bpm      Blood Flow Results Phase: Baseline      Time Results Indexed Values   QP 11:35 AM 5.92 L/min    3.66 L/min/m2      QS 11:35 AM 5.92 L/min    3.66 L/min/m2      Blood Oximetry Phase: Baseline      Time Hb SAT(%) PO2 Content PA Sat   PA 11:35 AM  66.6 %      66.6 %      Art 11:35 AM  100 %     10.74 mL/dL       Cardiac Output Phase: Baseline      Time TDCO TDCI Jaden C.O. Jaden C.I. Jaden HR   Cardiac Output Results 11:35 AM 6.03 L/min    3.74 L/min/m2    5.92 L/min    3.66 L/min/m2        11:51 AM 6.03 L/min          Resistance Results Phase: Baseline      Time PVR SVR PVR-I SVR-I TPR TVR TPR-I TVR-I PVR/SVR TPR/TVR   Resistance Results (Metric) 11:35 .64 dsc-5     172.25 dsc-5/m2     186.62 dsc-5     301.45 dsc-5/m2         Resistance Results (Wood) 11:35 AM 1.33 MARTIN     2.15 MARTIN/m2     2.33 MARTIN     3.77 MARTIN/m2         Stoke Volume Results Phase: Baseline      Time RVSW LVSW RVSW-I LVSW-I   Stroke Work Results 11:35 AM 9.33 gm*m     5.77 gm*m/m2         TTE 10/15/19:  Interpretation Summary  Left ventricular function, chamber size, wall motion, and wall thickness are  normal.The EF is 60-65%. No regional wall motion abnormalities are seen.  Right  ventricular function, chamber size, wall motion, and thickness are  normal.  Trace tricuspid insufficiency is present. Pulmonary artery systolic pressure  is normal. The inferior vena cava was normal in size with preserved  respiratory variability. No pericardial effusion is present.  There has been no change.    Assessment and Plan:    59 year old male with a history of ASD (s/p repair in childhood), paroxsymal AFib/AF (s/p ablation), NICM, diastolic dysfunction, alcohol abuse, Pierce's esophagus, ulcerative colitis, GIB (s/p splenic embolization), and hypothyroidism, now s/p OHT and bypass of persistent left SVC to right atrial appendage 2/24/19 who presents for ongoing evaluation and management.    NICM s/p OHT and bypass of persistent left SVC to right atrial appendage 2/24/19  His postoperative course was c/b shock due to RV dysfunction requiring IABP support, small pneumoperitoneum (clinically insignificant), chest wall hematoma (former ICD site, s/p evacuation and drain placement 3/31/19), HCAP/klebsiella pneumonia, and FRANKLIN (required short-term HD, now recovered). His biopsy 3/25/19 showed mild AMR1 with some staining for c4d.  Repeat biopsy 3/28/19 showed improved AMR and less reactive capillary staining, and subsequent biopsies have now shown resolution of AMR and improved capillary staining.     Today patient is euvolemic by history and exam. Overall doing well. No CAV and grossly normal filling pressures with normal cardiac index/output on last surveillance testing. CMRI with normal biventricular function.     Serostatus:  - CMV D+/R+  - EBV D+/R+  - Toxo D-/R-     Immunosuppression:  - Sirolimus 4 mg daily, goal level 4-6, level pending from today  - Azathioprine 75 mg daily     Graft function:  - BPs: Not well controlled, increase losartan to 100 mg daily and continue amlodipine 10 mg daily. Instructed patient to call clinic if BP consistently > 140/90  - HRs:  Stable  - fluid status:  Euvolemic, off  diuretics      PPx:  - CAV: Aspirin 81mg daily and rosuvastatin 40 mg daily, referral to lipid clinic given still elevated above goal   - GERD:  Omeprazole (note h/o carrera's esophagus)  - Osteoporosis:  Calcium/vitamin D supplements     Routine screenings  Derm: DUE  Dental: UTD  Colonoscopy: due in 2025  EGD: due in 2025  Prostate:  stable  Eye: UTD  Flu/Pneumonia: UTD  RSV: DUE  Shingles: DUE  Covid: UTD     Ulcerative Colitis  Carrera Esophagitis  - Followed by GI    Chronic Kidney Disease Stage III  - Follows with Renal    Chronic Sinus Infections  - Follows with ENT  - Discussed that if any surgeries were to be planned in the future, would need to switch sirolimus back to tac for wound healing    Chronic Right Shoulder Pain  - Follows with Ortho    Optimal Vascular Metrics    Blood Pressure   BP < 140/90 Yes    On Aspirin  Yes    On Statin  Yes    Tobacco use  No     To Do:   - Increase losartan to 100 mg daily  - Repeat labs in 1-2 weeks  - Referral to lipid clinic  - Needs RSV, Shingles vaccines  - Needs EGD and colonoscopy this year  - Needs Derm follow up  - Monitor and record home BPs  - SOT labs in 6 months  - Follow up in one year for annual testing with coronary angiogram/RHC and echo if renal function remains stable    A total of 49 minutes was spent on the day of the visit, which includes preparation for the visit (reviewing previous medical records, laboratories and investigations), in conjunction with the actual clinic visit with the patient, which includes obtaining a history and physical exam, creating and reviewing the care plan, patient education (and family if present), counseling, documenting clinical information in the electronic health record and care coordination.     The longitudinal plan of care for the diagnosis(es)/condition(s) as documented were addressed during this visit. Due to the added complexity in care, I will continue to support [place] in the subsequent management and with  ongoing continuity of care.     Slime Lindsay MD   of Medicine, [facility]  Advanced Heart Failure and Transplant Cardiology       [facility]        Please do not hesitate to contact me if you have any questions/concerns.     Sincerely,     Slime Lindsay MD

## 2025-02-19 NOTE — NURSING NOTE
Transplant Coordinator Note     Reason for visit: 6th annual   Coordinator: Present       Health concerns addressed today:   1. Repeat EGD? Last 3/8/22~repeat every 3 years to eval Pierce's.   2. BP slightly elevated in clinic- Losartan increased.  3. Protein in urine- cont to monitor- creat is stable.    Serostatus:   - CMV D+/R+   - EBV D+/R+   - Toxo D-/R-       Immunosuppressants:   - Sirolimus 4 mg daily, goal level 4-6, level pending from today   - Azathioprine 75 mg daily       Routine screenings   Derm: UTD   Dental: UTD   Colonoscopy: completed 3/8/2022, every 3 years   EGD: 3/8/2022, every 3 years   Prostate:  stable   Eye: UTD   Flu/Pneumonia: DUE   Covid: UTD     Resulted labs and cardiac MRI reviewed with patient   Medication record reviewed and reconciled   Questions and concerns addressed   Pt verbalized an understanding of plan of care.     Patient Instructions   1. Increase your Losartan to 100mg daily.  2. Recheck your labs in about two weeks (BMP).  3. We will place a referral to the lipid clinic- they will call you to schedule.  4. Jocelynn will call with any remaining lab/testing results.  5. Please get your Shingrix vaccine this Spring (2 part series).  6. Please schedule your endoscopies this Spring.  7. If BP remains greater than 140/90 after Losartan increase, let Jocelynn know.    Next transplant clinic appointment:  in one year for 7th annual (angiogram).  Next lab draw: in two wieeks  Coordinator will call with all pending results.     Please call transplant coordinator with any questions:   Keiko Kapoor RN   Post-Heart Transplant Coordinator  [phone]

## 2025-02-21 LAB — IMMUKNOW IMMUNE CELL FUNCTION: 247 NG/ML

## 2025-02-24 ENCOUNTER — TELEPHONE (OUTPATIENT)
Dept: CARDIOLOGY | Facility: CLINIC | Age: 60
End: 2025-02-24
Payer: COMMERCIAL

## 2025-02-24 DIAGNOSIS — D72.9 ABNORMAL WHITE BLOOD CELL (WBC) COUNT: Primary | ICD-10-CM

## 2025-03-10 DIAGNOSIS — Z94.1 HEART REPLACED BY TRANSPLANT (H): ICD-10-CM

## 2025-03-10 DIAGNOSIS — D72.9 ABNORMAL WHITE BLOOD CELL (WBC) COUNT: Primary | ICD-10-CM

## 2025-03-10 RX ORDER — ROSUVASTATIN CALCIUM 40 MG/1
40 TABLET, COATED ORAL DAILY
Qty: 90 TABLET | Refills: 3 | Status: SHIPPED | OUTPATIENT
Start: 2025-03-10

## 2025-03-17 DIAGNOSIS — D72.9 ABNORMAL WHITE BLOOD CELL (WBC) COUNT: Primary | ICD-10-CM

## 2025-03-17 DIAGNOSIS — Z94.1 TRANSPLANTED HEART (H): ICD-10-CM

## 2025-03-17 LAB
DONOR IDENTIFICATION: NORMAL
DSA COMMENTS: NORMAL
DSA PRESENT: NO
DSA TEST METHOD: NORMAL
ORGAN: NORMAL
SA 1  COMMENTS: NORMAL
SA 1 CELL: NORMAL
SA 1 TEST METHOD: NORMAL
SA 2 CELL: NORMAL
SA 2 COMMENTS: NORMAL
SA 2 TEST METHOD: NORMAL
SA1 HI RISK ABY: NORMAL
SA1 MOD RISK ABY: NORMAL
SA2 HI RISK ABY: NORMAL
SA2 MOD RISK ABY: NORMAL
UNACCEPTABLE ANTIGENS: NORMAL
UNOS CPRA: 0

## 2025-03-18 NOTE — TELEPHONE ENCOUNTER
Pt called in to report swelling in lower extremities has improved. Pt is down 1 lb. Pt inquired about a massage, from a transplant perspective it is ok. Pt encouraged to call with any concerns.   
Age: 83 years old or older

## 2025-03-19 LAB
ATRIAL RATE - MUSE: 104 BPM
DIASTOLIC BLOOD PRESSURE - MUSE: NORMAL MMHG
INTERPRETATION ECG - MUSE: NORMAL
P AXIS - MUSE: 57 DEGREES
PR INTERVAL - MUSE: 184 MS
QRS DURATION - MUSE: 130 MS
QT - MUSE: 376 MS
QTC - MUSE: 494 MS
R AXIS - MUSE: 37 DEGREES
SYSTOLIC BLOOD PRESSURE - MUSE: NORMAL MMHG
T AXIS - MUSE: 83 DEGREES
VENTRICULAR RATE- MUSE: 104 BPM

## 2025-04-01 ENCOUNTER — TELEPHONE (OUTPATIENT)
Dept: CARDIOLOGY | Facility: CLINIC | Age: 60
End: 2025-04-01
Payer: COMMERCIAL

## 2025-04-01 DIAGNOSIS — D72.9 ABNORMAL WHITE BLOOD CELL (WBC) COUNT: Primary | ICD-10-CM

## 2025-04-01 NOTE — TELEPHONE ENCOUNTER
Left Voicemail (1st Attempt) for the patient to call back and schedule the following:    Appointment type: Lab  Provider: Jaleel  Return date: 04/07  Specialty phone number: [phone] opt 1  Additional appointment(s) needed: N/A  Additonal Notes: N/A

## 2025-04-02 ENCOUNTER — TELEPHONE (OUTPATIENT)
Dept: CARDIOLOGY | Facility: CLINIC | Age: 60
End: 2025-04-02
Payer: COMMERCIAL

## 2025-04-02 DIAGNOSIS — D72.9 ABNORMAL WHITE BLOOD CELL (WBC) COUNT: Primary | ICD-10-CM

## 2025-04-02 NOTE — TELEPHONE ENCOUNTER
Health Call Center    Phone Message    May a detailed message be left on voicemail: yes     Reason for Call: Other: Pt is unwilling to schedule labs prior to his 4/7/25 appt because he doesn't have time and was unwilling to have  look even to schedule the for the same day.  He was upset that he was given a short notice to schedule labs . This  explained to him that the OV will most likely need to be rescheduled which made him more upset.  Please call to discuss options with pt.     Action Taken: Other: cardio    Travel Screening: Not Applicable    Thank you!  Specialty Access Center       Date of Service:

## 2025-04-03 ENCOUNTER — LAB (OUTPATIENT)
Dept: LAB | Facility: HOSPITAL | Age: 60
End: 2025-04-03
Payer: COMMERCIAL

## 2025-04-03 DIAGNOSIS — E78.5 HYPERLIPIDEMIA LDL GOAL <100: ICD-10-CM

## 2025-04-03 DIAGNOSIS — Z94.1 TRANSPLANTED HEART (H): ICD-10-CM

## 2025-04-03 LAB
ANION GAP SERPL CALCULATED.3IONS-SCNC: 15 MMOL/L (ref 7–15)
APO A-I SERPL-MCNC: 182 MG/DL
BUN SERPL-MCNC: 43.3 MG/DL (ref 8–23)
CALCIUM SERPL-MCNC: 9.1 MG/DL (ref 8.8–10.4)
CHLORIDE SERPL-SCNC: 102 MMOL/L (ref 98–107)
CREAT SERPL-MCNC: 2.3 MG/DL (ref 0.67–1.17)
EGFRCR SERPLBLD CKD-EPI 2021: 31 ML/MIN/1.73M2
GLUCOSE SERPL-MCNC: 83 MG/DL (ref 70–99)
HCO3 SERPL-SCNC: 20 MMOL/L (ref 22–29)
POTASSIUM SERPL-SCNC: 4.4 MMOL/L (ref 3.4–5.3)
SODIUM SERPL-SCNC: 137 MMOL/L (ref 135–145)

## 2025-04-03 PROCEDURE — 80048 BASIC METABOLIC PNL TOTAL CA: CPT

## 2025-04-03 PROCEDURE — 36415 COLL VENOUS BLD VENIPUNCTURE: CPT

## 2025-04-03 PROCEDURE — 82310 ASSAY OF CALCIUM: CPT

## 2025-04-03 PROCEDURE — 83695 ASSAY OF LIPOPROTEIN(A): CPT

## 2025-04-03 NOTE — TELEPHONE ENCOUNTER
Called pt. Notified him he can get labs before or after visit, with before being preferred. Notified him that he would not need to fast for labs. Pt states he will try to do a walk-in at [facility] today or tomorrow.

## 2025-04-07 ENCOUNTER — TELEPHONE (OUTPATIENT)
Dept: CARDIOLOGY | Facility: CLINIC | Age: 60
End: 2025-04-07
Payer: COMMERCIAL

## 2025-04-07 ENCOUNTER — OFFICE VISIT (OUTPATIENT)
Dept: CARDIOLOGY | Facility: CLINIC | Age: 60
End: 2025-04-07
Attending: INTERNAL MEDICINE
Payer: COMMERCIAL

## 2025-04-07 VITALS
HEART RATE: 104 BPM | DIASTOLIC BLOOD PRESSURE: 89 MMHG | BODY MASS INDEX: 31.47 KG/M2 | WEIGHT: 207 LBS | SYSTOLIC BLOOD PRESSURE: 131 MMHG | OXYGEN SATURATION: 99 %

## 2025-04-07 DIAGNOSIS — D72.9 ABNORMAL WHITE BLOOD CELL (WBC) COUNT: Primary | ICD-10-CM

## 2025-04-07 DIAGNOSIS — Z94.1 TRANSPLANTED HEART (H): ICD-10-CM

## 2025-04-07 DIAGNOSIS — T86.290 VASCULOPATHY OF CARDIAC ALLOGRAFT (H): ICD-10-CM

## 2025-04-07 DIAGNOSIS — E78.00 PURE HYPERCHOLESTEROLEMIA: ICD-10-CM

## 2025-04-07 PROCEDURE — 99214 OFFICE O/P EST MOD 30 MIN: CPT | Performed by: INTERNAL MEDICINE

## 2025-04-07 PROCEDURE — 99211 OFF/OP EST MAY X REQ PHY/QHP: CPT | Performed by: INTERNAL MEDICINE

## 2025-04-07 ASSESSMENT — PAIN SCALES - GENERAL: PAINLEVEL_OUTOF10: NO PAIN (0)

## 2025-04-07 NOTE — PATIENT INSTRUCTIONS
April 7, 2025    Cardiology Provider You Saw During Your Visit: Dr. Marmolejo      Medication Changes: Start Repatha (1 injection every other week) if/when approved by insurance      Follow Up:   Fasting labs 3 months after starting Repatha  Follow up with cardiology in 1 year with fasting labs prior to visit      Follow the American Heart Association Diet and Lifestyle Recommendations:  -Limit saturated fat, trans fat, sodium, red meat, sweets and sugar-sweetened beverages. If you choose to eat red meat, compare labels and select the leanest cuts available.  -Aim for at least 150 minutes of moderate physical activity or 75 minutes of vigorous physical activity - or an equal combination of both - each week.      To Reach Us:  -During business hours: [phone], press option # 1 to schedule an appointment or to leave a message for your care team.     -After hours, weekends or holidays: [phone], press option #4 and ask to speak to the on-call cardiologist. Inform them you are a patient at the [facility].        **If you have a cardiac device, please make sure you schedule an in-person device check just prior to your cardiology provider appointments**        Andressa Marx RN  Cardiology Care Coordinator - General Cardiology  [facility]

## 2025-04-07 NOTE — PROGRESS NOTES
HPI: I had the privilege to evaluate and examine Mr Everton Larios is a 60 year old male with a history of ASD (s/p repair in childhood), paroxsymal AFib/AF (s/p ablation), NICM, diastolic dysfunction, alcohol abuse, Pierce's esophagus, ulcerative colitis, GIB (s/p splenic embolization), and hypothyroidism, now s/p heart transplant and bypass of persistent left SVC to right atrial appendage 19 who presents for lipid management.     Terms of labs his LPa level was substantially elevated at 182 mg/dl.  His lipid levels continue  to be elevated to the level despite Rosuva 40 mg.  Total cholesterol 243, .   Will plan to start Zetia 10 mg. Patient may need PCSK9 inhibitors to achieve LDL goal of less than 70.  Will start insurance approval process for Repatha.    Denies exertional chest pain, shortness of breath , orthopnea, paroxysmal nocturnal dyspnea, palpitations, syncope or presyncope. Functional status is excellent.     PAST MEDICAL HISTORY:  Past Medical History:   Diagnosis Date    Alcohol abuse     Arthritis     hands, neck    ASD (atrial septal defect)     s/p repair age 3    Asthma     Atrial fibrillation (H)     Pierce's esophagus 10/4/2018    Pierce's esophagus     Cataract     CHF (congestive heart failure) (H)     Chronic rhinitis 10/4/2018    Chronic systolic heart failure (H) 10/4/2018    Clotting disorder     Congenital cardiomyopathy in  (H)     Depression 10/4/2018    Depression     Diastolic dysfunction     DJD (degenerative joint disease)     neck    DJD (degenerative joint disease) - neck 10/4/2018    H/O congenital atrial septal defect (ASD) repair at age 3 10/4/2018    History of anesthesia complications     nausea    History of transfusion     Hypothyroidism     Hypothyroidism due to medication 10/4/2018    ICD (implantable cardioverter-defibrillator) in place     ICD, [facility] [employer] ; gen change 2018 10/4/2018    Intermittent asthma without complication  10/4/2018    Nonischemic cardiomyopathy (H) 10/4/2018    On amiodarone therapy 10/4/2018    Pacemaker     Paroxysmal atrial fibrillation (H) 10/4/2018    S/P ablation of atrial fibrillation 10/4/2018    Systolic heart failure (H)     Ulcerative colitis (H) 2005    Ulcerative colitis (H)     Ventricular tachycardia (H) 10/4/2018    Ventricular tachycardia (H)        CURRENT MEDICATIONS:  Current Outpatient Medications   Medication Sig Dispense Refill    acetaminophen (TYLENOL) 325 MG tablet Take 2 tablets (650 mg) by mouth every 4 hours as needed for mild pain      albuterol (PROAIR HFA/PROVENTIL HFA/VENTOLIN HFA) 108 (90 Base) MCG/ACT inhaler INHALE 2 PUFFS INTO THE LUNGS EVERY 6 HOURS AS NEEDED FOR SHORTNESS OF BREATH OR WHEEZING. 8.5 g 5    amLODIPine (NORVASC) 5 MG tablet Take 2 tablets (10 mg) by mouth daily. 180 tablet 11    aspirin (ASA) 81 MG chewable tablet Take 1 tablet (81 mg) by mouth daily      azaTHIOprine 75 MG TABS Take 75 mg by mouth daily. PLEASE CONFIRM THE DOSE WITH YOUR TRANSPLANT TEAM 90 tablet 3    calcium carbonate (OS-RADAMES) 500 MG tablet Take 1 tablet by mouth daily.      cetirizine (ZYRTEC) 10 MG tablet Take 10 mg by mouth daily      clindamycin (CLINDAMAX) 1 % external gel Apply twice daily as needed for acne on the spots 60 g 11    DULoxetine (CYMBALTA) 20 MG capsule TAKE 1 CAPSULE BY MOUTH EVERY DAY 90 capsule 3    fluticasone (FLONASE) 50 MCG/ACT nasal spray Spray 2 sprays into both nostrils daily.      fluticasone (FLOVENT HFA) 220 MCG/ACT inhaler INHALE 2 PUFFS INTO THE LUNGS TWICE A DAY 12 g 3    Fluticasone Propionate, Inhal, (FLUTICASONE PROPIONATE DISKUS) 100 MCG/ACT AEPB INHALE 1 PUFF INTO THE LUNGS EVERY 12 HOURS. 60 each 3    levothyroxine (SYNTHROID/LEVOTHROID) 75 MCG tablet TAKE 1 TABLET BY MOUTH EVERY DAY 90 tablet 2    losartan (COZAAR) 100 MG tablet Take 1 tablet (100 mg) by mouth daily. 90 tablet 3    melatonin 3 MG tablet Take 3 mg by mouth nightly as needed for sleep.       mometasone furoate (ASMANEX HFA) 100 MCG/ACT inhaler INHALE 2 PUFFS INTO THE LUNGS TWICE A DAY 13 g 3    multivitamin w/minerals (THERA-VIT-M) tablet Take 1 tablet by mouth daily      omeprazole (PRILOSEC) 40 MG DR capsule TAKE 1 CAPSULE BY MOUTH EVERY DAY 90 capsule 4    RESTASIS 0.05 % ophthalmic emulsion Place 1 drop into both eyes 2 times daily as needed for dry eyes.      rosuvastatin (CRESTOR) 40 MG tablet Take 1 tablet (40 mg) by mouth daily. 90 tablet 3    sildenafil (VIAGRA) 50 MG tablet Take 1 tablet (50 mg) by mouth daily as needed (Erectile dysfunction) 10 tablet 11    sirolimus (GENERIC EQUIVALENT) 0.5 MG tablet Take 1 tablet (0.5 mg) by mouth daily. Total dose 1.5 mg daily. 90 tablet 3    sirolimus (GENERIC EQUIVALENT) 1 MG tablet Take 1 tablet (1 mg) by mouth daily. Total dose 1.5 mg daily. 90 tablet 3    tadalafil (CIALIS) 10 MG tablet Take 1 tablet (10 mg) by mouth daily as needed (Erectile dysfunction) 10 tablet 3       PAST SURGICAL HISTORY:  Past Surgical History:   Procedure Laterality Date    ABLATION OF DYSRHYTHMIC FOCUS      for A fib    AICD, DUAL CHAMBER      ASD REPAIR  01/01/1968    BRONCHOSCOPY (RIGID OR FLEXIBLE), DIAGNOSTIC N/A 04/30/2019    Procedure: BRONCHOSCOPY, WITH BAL;  Surgeon: Margot Chiang MD;  Location: [facility]    CARDIAC DEFIBRILLATOR PLACEMENT      x2--last was Feb 2018    COLONOSCOPY N/A 02/20/2020    Procedure: COLONOSCOPY, WITH POLYPECTOMY AND BIOPSY;  Surgeon: Ranjeet Cooper MD;  Location: [facility]    COLONOSCOPY N/A 02/05/2015    Procedure: COLONOSCOPY with biopsy;  Surgeon: Fernie Akers MD;  Location: [facility];  Service:     COLONOSCOPY N/A 12/19/2017    Procedure: COLONOSCOPY with biopsies and polypectomies using snare;  Surgeon: Gael Romero MD;  Location: [facility];  Service:     COLONOSCOPY N/A 3/8/2022    Procedure: COLONOSCOPY, WITH POLYPECTOMY AND BIOPSY;  Surgeon: Paddy Saldivar DO;  Location: [facility]    CV CORONARY ANGIOGRAM  N/A 02/12/2020    Procedure: CV CORONARY ANGIOGRAM;  Surgeon: Isiah Mcallister MD;  [facility]    CV CORONARY ANGIOGRAM N/A 03/17/2021    Procedure: CV CORONARY ANGIOGRAM;  Surgeon: Santino Mckeon MD;  [facility]    CV CORONARY ANGIOGRAM N/A 2/23/2022    Procedure: CV CORONARY ANGIOGRAM;  Surgeon: Yves Rodrigues MD;  [facility]    CV CORONARY ANGIOGRAM N/A 2/23/2023    Procedure: Coronary Angiogram;  Surgeon: Santino Mckeon MD;  [facility]    CV HEART BIOPSY N/A 03/04/2019    Procedure: Heart Biopsy;  Surgeon: Abhijeet Titus MD;  [facility]    CV HEART BIOPSY N/A 03/25/2019    Procedure: Heart Biopsy;  Surgeon: Yves Rodrigues MD;  [facility]: [facility]    CV HEART BIOPSY N/A 03/28/2019    Procedure: Heart Cath Heart Biopsy;  Surgeon: Yves Rodrigues MD;  [facility]    CV HEART BIOPSY N/A 04/10/2019    Procedure: CV HEART BIOPSY;  Surgeon: Isiah Mcallister MD;  [facility]    CV HEART BIOPSY N/A 04/24/2019    Procedure: CV HEART BIOPSY;  Surgeon: Isiah Mcallister MD;  [facility]    CV HEART BIOPSY N/A 05/08/2019    Procedure: CV HEART BIOPSY;  Surgeon: Isiah Mcallister MD;  [facility]    CV HEART BIOPSY N/A 06/04/2019    Procedure: CV HEART BIOPSY;  Surgeon: Alton Solomon MD;  [facility]    CV HEART BIOPSY N/A 05/22/2019    Procedure: CV HEART BIOPSY;  Surgeon: Yves Rodrigues MD;  [facility]    CV HEART BIOPSY N/A 07/17/2019    Procedure: CV HEART BIOPSY;  Surgeon: Isiah Mcallister MD;  [facility]    CV HEART BIOPSY N/A 08/13/2019    Procedure: CV HEART BIOPSY;  Surgeon: Jackson Patten MD;  Location:  [facility]ARD[facility] C[facility]    CV HEART  BIOPSY N/A 10/15/2019    Procedure: CV HEART BIOPSY;  Surgeon: Santino Mckeon MD;  Location:  [facility]    CV HEART BIOPSY N/A 02/12/2020    Procedure: CV HEART BIOPSY;  Surgeon: Isiah Mcallister MD;  [facility]    CV HEART BIOPSY N/A 05/05/2020    Procedure: CV HEART BIOPSY;  Surgeon: Yves Rodrigues MD;  Location:  [facility]    CV HEART BIOPSY N/A 03/17/2021    Procedure: CV HEART BIOPSY;  Surgeon: Santino Mckeon MD;  [facility]    CV HEART BIOPSY N/A 5/10/2022    Procedure: Heart Biopsy;  Surgeon: Yves Rodrigues MD;  Location:  [facility]    CV INTRA AORTIC BALLOON N/A 02/18/2019    Procedure: Intra-Aortic Balloon;  Surgeon: Alton Solomon MD;  Location:  [facility]ARD[facility]    CV INTRA AORTIC BALLOON N/A 02/20/2019    Procedure: Replace subclavian IABP;  Surgeon: Yves Rodrigues MD;  Location:  [facility]ARDI[facility] CATH [facility]    CV INTRAVASULAR ULTRASOUND N/A 02/12/2020    Procedure: CV INTRAVASCULAR ULTRASOUND;  Surgeon: Isiah Mcallister MD;  Location:  [facility]    CV LEFT HEART CATH N/A 03/19/2019    Procedure: Left Heart Cath;  Surgeon: Yves Rodrigues MD;  Location:  [facility]    CV LEFT HEART CATH N/A 02/12/2020    Procedure: Left Heart Cath;  Surgeon: Isiah Mcallister MD;  Location:  [facility]    CV MYOCARDIAL BIOPSY N/A 03/19/2019    Procedure: RHC/HBx - Femoral access for 3/19 per Nimisha GONZALEZ;  Surgeon: Yves Rodrigues MD;  Location:  [facility]    CV MYOCARDIAL BIOPSY N/A 03/11/2019    Procedure: ADD ON RHC/HBX;  Surgeon: Alton Solomon MD;  Location:  [facility]    CV RIGHT HEART CATH MEASUREMENTS RECORDED N/A 03/25/2019    Procedure: Right Heart Cath;  Surgeon: Yves Rodrigues MD;  Location:  [facility]    CV RIGHT HEART CATH MEASUREMENTS RECORDED N/A 03/28/2019     Procedure: Heart Cath Right Heart Cath;  Surgeon: Yves Rodrigues MD;  Location:  [facility]    CV RIGHT HEART CATH MEASUREMENTS RECORDED N/A 04/24/2019    Procedure: CV RIGHT HEART CATH;  Surgeon: Isiah Mcallister MD;  Location:  [facility]    CV RIGHT HEART CATH MEASUREMENTS RECORDED N/A 06/04/2019    Procedure: CV RIGHT HEART CATH;  Surgeon: Alton Solomon MD;  Location:  [facility]ARD[facility]    CV RIGHT HEART CATH MEASUREMENTS RECORDED N/A 05/22/2019    Procedure: CV RIGHT HEART CATH;  Surgeon: Yves Rodrigues MD;  Location:  [facility]ARD[facility] CATH [facility]    CV RIGHT HEART CATH MEASUREMENTS RECORDED N/A 08/13/2019    Procedure: CV RIGHT HEART CATH;  Surgeon: Jackson Patten MD;  Location:  [facility]ARD[facility]    CV RIGHT HEART CATH MEASUREMENTS RECORDED N/A 10/15/2019    Procedure: CV RIGHT HEART CATH;  Surgeon: Santino Mckeon MD;  Location:  [facility]    CV RIGHT HEART CATH MEASUREMENTS RECORDED N/A 02/12/2020    Procedure: CV RIGHT HEART CATH;  Surgeon: Isiah Mcallister MD;  Location:  [facility]    CV RIGHT HEART CATH MEASUREMENTS RECORDED N/A 03/17/2021    Procedure: CV RIGHT HEART CATH;  Surgeon: Santino Mckeon MD;  Location:  [facility]    CV RIGHT HEART CATH MEASUREMENTS RECORDED N/A 2/23/2022    Procedure: CV RIGHT HEART CATH;  Surgeon: Yves Rodrigues MD;  Location:  [facility]    CV RIGHT HEART CATH MEASUREMENTS RECORDED N/A 5/10/2022    Procedure: Right Heart Catheterization;  Surgeon: Yves Rodrigues MD;  Location:  [facility]ARD[facility]    CV RIGHT HEART CATH MEASUREMENTS RECORDED N/A 2/23/2023    Procedure: Right Heart Catheterization;  Surgeon: Santino Mckeon MD;  Location: [facility] CARDIAC CATH LAB    ESOPHAGOSCOPY, GASTROSCOPY, DUODENOSCOPY (EGD), COMBINED N/A 12/19/2017    Procedure: ESOPHAGOGASTRODUODENOSCOPY (EGD) with biopsies;  Surgeon: Gael RODRIGUEZ  MD Nick;  Location: [facility];  Service:     ESOPHAGOSCOPY, GASTROSCOPY, DUODENOSCOPY (EGD), COMBINED N/A 3/8/2022    Procedure: ESOPHAGOGASTRODUODENOSCOPY, WITH BIOPSY;  Surgeon: Paddy Saldivar DO;  [facility]    H STATISTIC PICC LINE INSERTION >5YR, FAILED Bilateral 10/28/2021    L sided SVC    HAND SURGERY Left     HC REVISE MEDIAN N/CARPAL TUNNEL SURG  09/21/2016    Procedure: OPEN RIGHT CARPAL TUNNEL RELEASE CORTISONE INJECTION RIGHT THUMB;  Surgeon: Duong Santoyo MD;  Location: [facility];  Service: Orthopedics    INCISION AND DRAINAGE CHEST WASHOUT, COMBINED N/A 03/21/2019    Procedure: Incision And Drainage; Evacuation Right Chest Wall Hematoma;  Surgeon: Dewayne House MD;  [facility]    INSERT INTRAAORTIC BALLOON PUMP Left 02/19/2019    Procedure: Insert left  Subclavian Balloon Pump,  Removal Right femoral arterial balloom pump sheath;  Surgeon: Dewayne House MD;  [facility]    INSERT INTRAAORTIC BALLOON PUMP Left 02/21/2019    Procedure: SUBCLAVIAN BALLOON PUMP PLACEMENT;  Surgeon: Ben White MD;  [facility]    INSERT INTRACORONARY STENT      x2    IR PICC PLACEMENT > 5 YRS OF AGE  05/08/2019    IR PICC PLACEMENT > 5 YRS OF AGE  9/21/2024    TRANSPLANT HEART RECIPIENT N/A 02/24/2019    Procedure: TRANSPLANT HEART RECIPIENT;  Surgeon: Dewayne House MD;  Location: [facility]       ALLERGIES     Allergies   Allergen Reactions    Hydromorphone Other (See Comments)     Significant Delirium    Adhesive Tape Blisters     Tegaderm causes bruises, blisters and extreme irritation.    Lisinopril Other (See Comments)     hypotension    Quinolones Other (See Comments)     Would not rx quinolones until QTc interval improved.     Tobramycin Other (See Comments)     Would not use aminoglycosides as his kidney function is very borderline    Codeine Nausea       FAMILY HISTORY:  Family History   Problem Relation Age of Onset    Skin Cancer Mother      Endometrial Cancer Mother     Osteoporosis Mother     Hypertension Father     Endometrial Cancer Maternal Grandmother     Hip fracture No family hx of     Nephrolithiasis No family hx of        SOCIAL HISTORY:  Social History     Socioeconomic History    Marital status: Single   Tobacco Use    Smoking status: Former     Current packs/day: 0.00     Average packs/day: 1 pack/day for 28.8 years (28.8 ttl pk-yrs)     Types: Cigarettes     Start date: 1980     Quit date: 2008     Years since quittin.4     Passive exposure: Past    Smokeless tobacco: Never   Vaping Use    Vaping status: Never Used   Substance and Sexual Activity    Alcohol use: Yes     Alcohol/week: 1.0 - 2.0 standard drink of alcohol     Types: 1 - 2 Cans of beer per week    Drug use: No   Social History Narrative    Everton is a retired . He lives by himself in a townhouse in [place]. He has no lawn care responsibilities. He will be staying with his folks during his post-hospital early transplant care time. No history of TB exposures.     He works part-time at [employer].     Social Drivers of Health     Financial Resource Strain: Low Risk  (2024)    Financial Resource Strain     Within the past 12 months, have you or your family members you live with been unable to get utilities (heat, electricity) when it was really needed?: No   Food Insecurity: Low Risk  (2024)    Food Insecurity     Within the past 12 months, did you worry that your food would run out before you got money to buy more?: No     Within the past 12 months, did the food you bought just not last and you didn t have money to get more?: No   Transportation Needs: Low Risk  (2024)    Transportation Needs     Within the past 12 months, has lack of transportation kept you from medical appointments, getting your medicines, non-medical meetings or appointments, work, or from getting things that you need?: No   Interpersonal Safety: Low Risk  (2024)     Interpersonal Safety     Do you feel physically and emotionally safe where you currently live?: Yes     Within the past 12 months, have you been hit, slapped, kicked or otherwise physically hurt by someone?: No     Within the past 12 months, have you been humiliated or emotionally abused in other ways by your partner or ex-partner?: No   Housing Stability: High Risk (9/21/2024)    Housing Stability     Do you have housing? : No     Are you worried about losing your housing?: No       ROS:   Constitutional: No fever, chills, or sweats. No weight gain/loss   ENT: No visual disturbance, ear ache, epistaxis, sore throat  Allergies/Immunologic: Negative.   Respiratory: No cough, hemoptysia  Cardiovascular: As per HPI  GI: No nausea, vomiting, hematemesis, melena, or hematochezia  : No urinary frequency, dysuria, or hematuria  Integument: Negative  Psychiatric: Negative  Neuro: Negative  Endocrinology: Negative   Musculoskeletal: Negative    EXAM:  /89 (BP Location: Right arm, Patient Position: Chair, Cuff Size: Adult Large)   Pulse 104   Wt 93.9 kg (207 lb)   SpO2 99%   BMI 31.47 kg/m    In general, the patient is a pleasant male in no apparent distress.    HEENT: NC/AT.  PERRLA.  EOMI.  Sclerae white, not injected.  Nares clear.  Pharynx without erythema or exudate.  Dentition intact.    Neck: No adenopathy.  No thyromegaly. Carotids +4/4 bilaterally without bruits.  No jugular venous distension.   Heart: RRR. Normal S1, S2 splits physiologically. No murmur, rub, click, or gallop. The PMI is in the 5th ICS in the midclavicular line. There is no heave.    Lungs: CTA.  No ronchi, wheezes, rales.  No dullness to percussion.   Abdomen: Soft, nontender, nondistended. No organomegaly.  No bruits.   Extremities: No clubbing, cyanosis, or edema.  The pulses are +4/4 at the radial, brachial, femoral, popliteal, DP, and PT sites bilaterally.  No bruits are noted.  Neurologic: Alert and oriented to  person/place/time, normal speech, gait and affect  Skin: No petechiae, purpura or rash.    Labs:  LIPID RESULTS:  Lab Results   Component Value Date    CHOL 243 (H) 02/19/2025    CHOL 255 (H) 03/17/2021    HDL 91 02/19/2025    HDL 89 03/17/2021     (H) 02/19/2025     (H) 03/17/2021    TRIG 140 02/19/2025    TRIG 128 03/17/2021    NHDL 152 (H) 02/19/2025    NHDL 166 (H) 03/17/2021       LIVER ENZYME RESULTS:  Lab Results   Component Value Date    AST 32 02/19/2025    AST 48 (H) 03/17/2021    ALT 27 02/19/2025    ALT 49 03/17/2021       CBC RESULTS:  Lab Results   Component Value Date    WBC 3.5 (L) 02/19/2025    WBC 5.0 04/09/2021    RBC 5.05 02/19/2025    RBC 4.61 04/09/2021    HGB 14.1 02/19/2025    HGB 11.2 (A) 04/09/2021    HCT 42.1 02/19/2025    HCT 34.6 04/09/2021    MCV 83 02/19/2025    MCV 75 04/09/2021    MCH 27.9 02/19/2025    MCH 24.3 04/09/2021    MCHC 33.5 02/19/2025    MCHC 32.4 04/09/2021    RDW 14.4 02/19/2025    RDW 14.9 04/09/2021     02/19/2025     04/09/2021       BMP RESULTS:  Lab Results   Component Value Date     04/03/2025     03/17/2021    POTASSIUM 4.4 04/03/2025    POTASSIUM 4.4 05/28/2022    POTASSIUM 3.9 03/17/2021    CHLORIDE 102 04/03/2025    CHLORIDE 107 05/28/2022    CHLORIDE 106 03/17/2021    CO2 20 (L) 04/03/2025    CO2 24 05/28/2022    CO2 24 03/17/2021    ANIONGAP 15 04/03/2025    ANIONGAP 6 05/28/2022    ANIONGAP 8 03/17/2021    GLC 83 04/03/2025    GLC 97 05/28/2022    GLC 85 03/17/2021    BUN 43.3 (H) 04/03/2025    BUN 31 (H) 05/28/2022    BUN 32 (H) 03/17/2021    CR 2.30 (H) 04/03/2025    CR 1.80 (H) 03/17/2021    GFRESTIMATED 31 (L) 04/03/2025    GFRESTIMATED 34 (L) 06/22/2021    GFRESTIMATED 41 (L) 03/17/2021    GFRESTBLACK 41 (L) 06/22/2021    GFRESTBLACK 47 (L) 03/17/2021    RADAMES 9.1 04/03/2025    RADAMES 9.0 03/17/2021        A1C RESULTS:  Lab Results   Component Value Date    A1C 5.9 (H) 02/19/2025    A1C 5.8 (H) 02/12/2020       INR  RESULTS:  Lab Results   Component Value Date    INR 1.17 (H) 09/23/2019    INR 1.22 (H) 05/08/2019       Data   cMRI 2/19/25  SUMMARY  ===================================================================  Clinical history: 59-year-old man with a heart transplant on 02/24/2019.  Comparison CMR: 04/18/2024  1. The LV is normal in cavity size and wall thickness. The global systolic function is normal. The LVEF is 56%. There are no regional wall motion abnormalities.  2. The RV is normal in cavity size. The global systolic function is normal. The RVEF is 66%.  3. Both atria are severely dilated in size.  4. There is no significant valvular disease.   5. Late gadolinium enhancement imaging shows no myocardial infarction, replacement fibrosis, or infiltration.  6. Regadenoson stress perfusion imaging shows no ischemia.  7. There is no pericardial effusion or thickening.  8. There is no intracardiac thrombus.  CONCLUSIONS: Orthotopic heart transplant recipient with LVEF of 56% and RVEF 66% without any LGE. Compared to the prior CMR dated 04/18/2024, there have been no significant changes.     TTE 9/21/24  Interpretation Summary  Left ventricular size, wall motion and function are normal. The ejection  fraction is 60-65%.  Mildly decreased right ventricular systolic function  The rhythm was sinus tachycardia.  Right ventricular systolic pressure could not be approximated due to  inadequate tricuspid regurgitation.  No hemodynamically significant valvular abnormalities on 2D or color flow imaging.     cMRI 4/18/24  SUMMARY   ===================================================================  Clinical history: 59-year old male with a history of orthotopic heart transplantation in 2019. His last coronary angiogram in 02/2023 was devoid of coronary allograft vasculopathy. CMR for graft surveillance.  Comparison CMR: 02/23/2022  1. The LV is normal in cavity size and wall thickness. The global systolic function is normal. The  LVEF is 54%. There are no regional wall motion abnormalities.  2. The RV is normal in cavity size. The global systolic function is normal. The RVEF is 54%.   3. The left atrium is mildly enlarged due to transplantation. The right atrium is normal in size.   4. There is no significant valvular disease.   5. Late gadolinium enhancement imaging shows a small burden of patchy, midmyocardial fibrosis in the basal inferoseptal segment. This is of unclear significance.  6. Regadenoson stress perfusion imaging shows no ischemia.  7.  There is no pericardial effusion or thickening.  8. There is no intracardiac thrombus.  CONCLUSIONS: Orthotopic heart transplant. Normal biventricular graft function without ischemia. there is a small burden of fibrosis of unclear significance. Upon direct visual comparison to the prior examinations dated 03/17/2021 and 02/23/2022, there are no significant changes in the graft function or the degree of fibrosis.      TTE 2/23/23  Interpretation Summary  Left ventricular size, wall motion and function are normal. The ejection  fraction is 55-60%.  On direct comparison, the global right ventricular function appears mildly  reduced compared to 2022, but TAPSE and S' values are similar.  No significant valve abnormalities.  The inferior vena cava is normal.  No pericardial effusion.     Coronary Angiogram/RHC 2/23/23  Coronary Findings     Diagnostic  Dominance: Right  Left Main   The vessel is large. There was 0% vessel disease.      Left Anterior Descending   The vessel is large. There was 0% vessel disease.      Left Circumflex   The vessel is moderate in size. There was 0% vessel disease.      First Obtuse Marginal Branch   The vessel is large.      Right Coronary Artery   The vessel is large. There was 0% vessel disease.          Intervention      No interventions have been documented.      Hemodynamics     Right Heart Catheterization:  /89/114 mmHg  HR 92 BPM    RA 7/11/7 mmHg  RV 40/7  mmHg  PA 40/19/28 mmHg  PCW 17/24/17 mmHg  Jaden CO 7.13 L/min Normal = 4.0-8.0 L/min  Jaden CI 3.64 L/min/m2 Normal = 2.5-4.0 L/min/m2  TD CO 6.93 L/min Normal = 4.0-8.0 L/min  TD CI 3.54 L/min/m2 Normal = 2.5-4.0 L/min/m2  PA sat 72.4%   Hgb 10.7 g/dL   PVR 1.71 Woods units  SVR 1021 dynes-sec/cm5      ECG 2/23/23 shows sinus rhythm with nonspecific intraventricular conduction block, no acute ST-T changes     RHC 2/23/22  RA: 10  RV: 31 (11)  PA: 30/14 (21)  PVR: 0.91  Jaden CO: 6.3    CI: 3.3  TDCO: 7.4       CI: 3.8      Angiogram 2/23/22  Left Main   The vessel is large. There was 0% vessel disease.   Left Anterior Descending   The vessel is large. There was 0% vessel disease.   Left Circumflex   The vessel is moderate in size. There was 0% vessel disease.   Right Coronary Artery   The vessel is large. There was 0% vessel disease.         Assessment and Plan:     I discussed the results with patient.  I disucssed the importance of a heart healthy diet and lifestyle.  Patient has not only mixed dyslipidemia but also a very high Lpa 187 mg/dl (normal Lpa < 30 mg/dl).  Eyond rosuvastatin.  Our goal is to lower LDL-chol < 70 mg/dl - we add zetia 10 mg/d and also Repatha 140 mg subcutaneously every 2 weeks.    Mixed dyslipidemia, LDL goal less than 70.  Lipid levels continue to be elevated above target despite high-dose rosuvastatin.  Will add Zetia 10 but suspect patient will need PCSK9 inhibitors.    Recheck fasting lipids within 3 months.  There is medication in development to lower Lpa - normal is LPa < 30 mg/dl/    I discussed the results with patient.  I discussed he importance of a heart healthy diet and lifestyle.  I have personally reviewed imaging, labs and EKG     Follow the American Heart Association Diet and Lifestyle Recommendations:  -Limit saturated fat, trans fat, sodium, red meat, sweets and sugar-sweetened beverages. If you choose to eat red meat, compare labels and select the leanest cuts  available.     I saw and evaluated patient with CV fellow. I examined patient with CV fellow. I discussed the results with patient and CV fellow. I discussed our plan with patient and CV fellow.  I agree with CV fellow's note and I edited the CV fellow's note to make it a more comprehensive document.     Stevie Marmolejo MD, PhD  Professor of Medicine  Division of Cardiology    CC  Patient Care Team:  Fredrick Wolff MD as PCP - General (Internal Medicine)  Jocelynn Jerez, RN as Transplant Coordinator  Ric Shukla MD as MD (Infectious Diseases)  Agata Weathers MD as MD (INTERNAL MEDICINE - ENDOCRINOLOGY, DIABETES & METABOLISM)  Franko Preciado [facility] as Pharmacist (Pharmacist)  Jocelynn Jensen MD as Fellow (Student in organized health care education/training program)  Fredrick Wolff MD as Assigned PCP  Magan Hinson MD as MD (Dermatology)  Gael Merritt MD as Assigned Musculoskeletal Provider  Madison Lindsay MD as Assigned Heart and Vascular Provider  Stevie Marmolejo MD as MD (Cardiovascular Disease)  Fely Diamond APRN CNP as Nurse Practitioner (Dermatology)  Andressa Marx RN as Specialty Care Coordinator (Cardiology)  MADISON LINDSAY

## 2025-04-07 NOTE — NURSING NOTE
"Chief Complaint   Patient presents with    New Patient     Mert Marmolejo pt, referred for lipid management      BP Readings from Last 1 Encounters:   04/07/25 131/89      Pulse Readings from Last 1 Encounters:   04/07/25 104      Ht Readings from Last 2 Encounters:   01/27/25 1.727 m (5' 8\")   10/15/24 1.727 m (5' 8\")      Wt Readings from Last 2 Encounters:   04/07/25 93.9 kg (207 lb)   02/19/25 92.1 kg (203 lb)      Body mass index is 31.47 kg/m .    Vitals were taken, medications reconciled.     Moise Alejo, Clinic Assistant    1:01 PM    "

## 2025-04-07 NOTE — LETTER
2025      RE: Everton Larios  [address] [id number]       Dear Colleague,    Thank you for the opportunity to participate in the care of your patient, Everton Larios, at the [facility] [place] at [facility]. Please see a copy of my visit note below.    HPI: I had the privilege to evaluate and examine Mr Everton Larios is a 60 year old male with a history of ASD (s/p repair in childhood), paroxsymal AFib/AF (s/p ablation), NICM, diastolic dysfunction, alcohol abuse, Pierce's esophagus, ulcerative colitis, GIB (s/p splenic embolization), and hypothyroidism, now s/p heart transplant and bypass of persistent left SVC to right atrial appendage 19 who presents for lipid management.     Terms of labs his LPa level was substantially elevated at 182 mg/dl.  His lipid levels continue  to be elevated to the level despite Rosuva 40 mg.  Total cholesterol 243, .   Will plan to start Zetia 10 mg. Patient may need PCSK9 inhibitors to achieve LDL goal of less than 70.  Will start insurance approval process for Repatha.    Denies exertional chest pain, shortness of breath , orthopnea, paroxysmal nocturnal dyspnea, palpitations, syncope or presyncope. Functional status is excellent.     PAST MEDICAL HISTORY:  Past Medical History:   Diagnosis Date     Alcohol abuse      Arthritis     hands, neck     ASD (atrial septal defect)     s/p repair age 3     Asthma      Atrial fibrillation (H)      Pierce's esophagus 10/4/2018     Pierce's esophagus      Cataract      CHF (congestive heart failure) (H)      Chronic rhinitis 10/4/2018     Chronic systolic heart failure (H) 10/4/2018     Clotting disorder      Congenital cardiomyopathy in  (H)      Depression 10/4/2018     Depression      Diastolic dysfunction      DJD (degenerative joint disease)     neck     DJD (degenerative joint disease) - neck 10/4/2018     H/O congenital  atrial septal defect (ASD) repair at age 3 10/4/2018     History of anesthesia complications     nausea     History of transfusion      Hypothyroidism      Hypothyroidism due to medication 10/4/2018     ICD (implantable cardioverter-defibrillator) in place      ICD, [email or web address] 2008; gen change 2/2018 10/4/2018     Intermittent asthma without complication 10/4/2018     Nonischemic cardiomyopathy (H) 10/4/2018     On amiodarone therapy 10/4/2018     Pacemaker      Paroxysmal atrial fibrillation (H) 10/4/2018     S/P ablation of atrial fibrillation 10/4/2018     Systolic heart failure (H)      Ulcerative colitis (H) 2005     Ulcerative colitis (H)      Ventricular tachycardia (H) 10/4/2018     Ventricular tachycardia (H)        CURRENT MEDICATIONS:  Current Outpatient Medications   Medication Sig Dispense Refill     acetaminophen (TYLENOL) 325 MG tablet Take 2 tablets (650 mg) by mouth every 4 hours as needed for mild pain       albuterol (PROAIR HFA/PROVENTIL HFA/VENTOLIN HFA) 108 (90 Base) MCG/ACT inhaler INHALE 2 PUFFS INTO THE LUNGS EVERY 6 HOURS AS NEEDED FOR SHORTNESS OF BREATH OR WHEEZING. 8.5 g 5     amLODIPine (NORVASC) 5 MG tablet Take 2 tablets (10 mg) by mouth daily. 180 tablet 11     aspirin (ASA) 81 MG chewable tablet Take 1 tablet (81 mg) by mouth daily       azaTHIOprine 75 MG TABS Take 75 mg by mouth daily. PLEASE CONFIRM THE DOSE WITH YOUR TRANSPLANT TEAM 90 tablet 3     calcium carbonate (OS-RADAMES) 500 MG tablet Take 1 tablet by mouth daily.       cetirizine (ZYRTEC) 10 MG tablet Take 10 mg by mouth daily       clindamycin (CLINDAMAX) 1 % external gel Apply twice daily as needed for acne on the spots 60 g 11     DULoxetine (CYMBALTA) 20 MG capsule TAKE 1 CAPSULE BY MOUTH EVERY DAY 90 capsule 3     fluticasone (FLONASE) 50 MCG/ACT nasal spray Spray 2 sprays into both nostrils daily.       fluticasone (FLOVENT HFA) 220 MCG/ACT inhaler INHALE 2 PUFFS INTO THE LUNGS TWICE A DAY 12 g 3      Fluticasone Propionate, Inhal, (FLUTICASONE PROPIONATE DISKUS) 100 MCG/ACT AEPB INHALE 1 PUFF INTO THE LUNGS EVERY 12 HOURS. 60 each 3     levothyroxine (SYNTHROID/LEVOTHROID) 75 MCG tablet TAKE 1 TABLET BY MOUTH EVERY DAY 90 tablet 2     losartan (COZAAR) 100 MG tablet Take 1 tablet (100 mg) by mouth daily. 90 tablet 3     melatonin 3 MG tablet Take 3 mg by mouth nightly as needed for sleep.       mometasone furoate (ASMANEX HFA) 100 MCG/ACT inhaler INHALE 2 PUFFS INTO THE LUNGS TWICE A DAY 13 g 3     multivitamin w/minerals (THERA-VIT-M) tablet Take 1 tablet by mouth daily       omeprazole (PRILOSEC) 40 MG DR capsule TAKE 1 CAPSULE BY MOUTH EVERY DAY 90 capsule 4     RESTASIS 0.05 % ophthalmic emulsion Place 1 drop into both eyes 2 times daily as needed for dry eyes.       rosuvastatin (CRESTOR) 40 MG tablet Take 1 tablet (40 mg) by mouth daily. 90 tablet 3     sildenafil (VIAGRA) 50 MG tablet Take 1 tablet (50 mg) by mouth daily as needed (Erectile dysfunction) 10 tablet 11     sirolimus (GENERIC EQUIVALENT) 0.5 MG tablet Take 1 tablet (0.5 mg) by mouth daily. Total dose 1.5 mg daily. 90 tablet 3     sirolimus (GENERIC EQUIVALENT) 1 MG tablet Take 1 tablet (1 mg) by mouth daily. Total dose 1.5 mg daily. 90 tablet 3     tadalafil (CIALIS) 10 MG tablet Take 1 tablet (10 mg) by mouth daily as needed (Erectile dysfunction) 10 tablet 3       PAST SURGICAL HISTORY:  Past Surgical History:   Procedure Laterality Date     ABLATION OF DYSRHYTHMIC FOCUS      for A fib     AICD, DUAL CHAMBER       ASD REPAIR  01/01/1968     BRONCHOSCOPY (RIGID OR FLEXIBLE), DIAGNOSTIC N/A 04/30/2019    Procedure: BRONCHOSCOPY, WITH BAL;  Surgeon: Margot Chiang MD;  Location:  GI     CARDIAC DEFIBRILLATOR PLACEMENT      x2--last was Feb 2018     COLONOSCOPY N/A 02/20/2020    Procedure: COLONOSCOPY, WITH POLYPECTOMY AND BIOPSY;  Surgeon: Ranjeet Cooper MD;  Location:  GI     COLONOSCOPY N/A 02/05/2015    Procedure:  COLONOSCOPY with biopsy;  Surgeon: Fernie Akers MD;  Location: [facility] GI;  Service:      COLONOSCOPY N/A 12/19/2017    Procedure: COLONOSCOPY with biopsies and polypectomies using snare;  Surgeon: Gael Romero MD;  Location: [facility] GI;  Service:      COLONOSCOPY N/A 3/8/2022    Procedure: COLONOSCOPY, WITH POLYPECTOMY AND BIOPSY;  Surgeon: Paddy Saldivar DO;  Location: [facility] GI     CV CORONARY ANGIOGRAM N/A 02/12/2020    Procedure: CV CORONARY ANGIOGRAM;  Surgeon: Isiah Mcallister MD;  [facility]     CV CORONARY ANGIOGRAM N/A 03/17/2021    Procedure: CV CORONARY ANGIOGRAM;  Surgeon: Santino Mckeon MD;  [facility]     CV CORONARY ANGIOGRAM N/A 2/23/2022    Procedure: CV CORONARY ANGIOGRAM;  Surgeon: Yves Rodrigues MD;  [facility]     CV CORONARY ANGIOGRAM N/A 2/23/2023    Procedure: Coronary Angiogram;  Surgeon: Santino Mckeon MD;  [facility]     CV HEART BIOPSY N/A 03/04/2019    Procedure: Heart Biopsy;  Surgeon: Abhijeet Titus MD;  [facility]     CV HEART BIOPSY N/A 03/25/2019    Procedure: Heart Biopsy;  Surgeon: Yves Rodrigues MD;  [facility]     CV HEART BIOPSY N/A 03/28/2019    Procedure: Heart Cath Heart Biopsy;  Surgeon: Yves Rodrigues MD;  [facility]     CV HEART BIOPSY N/A 04/10/2019    Procedure: CV HEART BIOPSY;  Surgeon: Isiah Mcallister MD;  [facility]     CV HEART BIOPSY N/A 04/24/2019    Procedure: CV HEART BIOPSY;  Surgeon: Isiah Mcallister MD;  [facility]     CV HEART BIOPSY N/A 05/08/2019    Procedure: CV HEART BIOPSY;  Surgeon: Isiah Mcallister MD;  [facility]     CV HEART BIOPSY N/A 06/04/2019    Procedure: CV HEART BIOPSY;  Surgeon: Alton Solomon MD;  Location:  HEART CARDIAC CATH LAB      CV HEART BIOPSY N/A 05/22/2019    Procedure: CV HEART BIOPSY;  Surgeon: Yves Rodrigues MD;  [facility]     CV HEART BIOPSY N/A 07/17/2019    Procedure: CV HEART BIOPSY;  Surgeon: Isiah Mcallister MD;  [facility]     CV HEART BIOPSY N/A 08/13/2019    Procedure: CV HEART BIOPSY;  Surgeon: Jackson Patten MD;  [facility]     CV HEART BIOPSY N/A 10/15/2019    Procedure: CV HEART BIOPSY;  Surgeon: Santnio Mckeon MD;  [facility]     CV HEART BIOPSY N/A 02/12/2020    Procedure: CV HEART BIOPSY;  Surgeon: Isiah Mcallister MD;  Location:  HEART CARDIAC CATH LAB     CV HEART BIOPSY N/A 05/05/2020    Procedure: CV HEART BIOPSY;  Surgeon: Yves Rodrigues MD;  Location:  HEART CARDIAC CATH LAB     CV HEART BIOPSY N/A 03/17/2021    Procedure: CV HEART BIOPSY;  Surgeon: Santino Mckeon MD;  Location:  HEART CARDIAC CATH LAB     CV HEART BIOPSY N/A 5/10/2022    Procedure: Heart Biopsy;  Surgeon: Yves Rodrigues MD;  Location:  HEART CARDIAC CATH LAB     CV INTRA AORTIC BALLOON N/A 02/18/2019    Procedure: Intra-Aortic Balloon;  Surgeon: Alton Solomon MD;  Location:  HEART CARDIAC CATH LAB     CV INTRA AORTIC BALLOON N/A 02/20/2019    Procedure: Replace subclavian IABP;  Surgeon: Yves Rodrigues MD;  Location:  HEART CARDIAC CATH LAB     CV INTRAVASULAR ULTRASOUND N/A 02/12/2020    Procedure: CV INTRAVASCULAR ULTRASOUND;  Surgeon: Isiah Mcallister MD;  Location:  HEART CARDIAC CATH LAB     CV LEFT HEART CATH N/A 03/19/2019    Procedure: Left Heart Cath;  Surgeon: Yves Rodrigues MD;  Location:  HEART CARDIAC CATH LAB     CV LEFT HEART CATH N/A 02/12/2020    Procedure: Left Heart Cath;  Surgeon: Isiah Mcallister MD;  Location:  HEART CARDIAC CATH LAB     CV MYOCARDIAL BIOPSY N/A 03/19/2019    Procedure: RHC/HBx - Femoral access for 3/19 per Nimisha GONZALEZ;   Surgeon: Yevs Rodrigues MD;  [facility]     CV MYOCARDIAL BIOPSY N/A 03/11/2019    Procedure: ADD ON RHC/HBX;  Surgeon: Alton Solomon MD;  [facility]     CV RIGHT HEART CATH MEASUREMENTS RECORDED N/A 03/25/2019    Procedure: Right Heart Cath;  Surgeon: Yves Rodrigues MD;  [facility]     CV RIGHT HEART CATH MEASUREMENTS RECORDED N/A 03/28/2019    Procedure: Heart Cath Right Heart Cath;  Surgeon: Yves Rodrigues MD;  [facility]     CV RIGHT HEART CATH MEASUREMENTS RECORDED N/A 04/24/2019    Procedure: CV RIGHT HEART CATH;  Surgeon: Isiah Mcallister MD;  [facility]     CV RIGHT HEART CATH MEASUREMENTS RECORDED N/A 06/04/2019    Procedure: CV RIGHT HEART CATH;  Surgeon: Alton Solomon MD;  [facility]     CV RIGHT HEART CATH MEASUREMENTS RECORDED N/A 05/22/2019    Procedure: CV RIGHT HEART CATH;  Surgeon: Yves Rodrigues MD;  [facility]     CV RIGHT HEART CATH MEASUREMENTS RECORDED N/A 08/13/2019    Procedure: CV RIGHT HEART CATH;  Surgeon: Jackson Patten MD;  [facility]     CV RIGHT HEART CATH MEASUREMENTS RECORDED N/A 10/15/2019    Procedure: CV RIGHT HEART CATH;  Surgeon: Santino Mckeon MD;  [facility]     CV RIGHT HEART CATH MEASUREMENTS RECORDED N/A 02/12/2020    Procedure: CV RIGHT HEART CATH;  Surgeon: Isiah Mcallister MD;  [facility]     CV RIGHT HEART CATH MEASUREMENTS RECORDED N/A 03/17/2021    Procedure: CV RIGHT HEART CATH;  Surgeon: Santino Mckeon MD;  [facility]     CV RIGHT HEART CATH MEASUREMENTS RECORDED N/A 2/23/2022    Procedure: CV RIGHT HEART CATH;  Surgeon: Yves Rodrigues MD;  [facility]     CV RIGHT HEART CATH MEASUREMENTS RECORDED N/A  5/10/2022    Procedure: Right Heart Catheterization;  Surgeon: Yves Rodrigues MD;  Location:  HEART CARDIAC CATH LAB     CV RIGHT HEART CATH MEASUREMENTS RECORDED N/A 2/23/2023    Procedure: Right Heart Catheterization;  Surgeon: Santino Mckeon MD;  Location:  HEART CARDIAC CATH LAB     ESOPHAGOSCOPY, GASTROSCOPY, DUODENOSCOPY (EGD), COMBINED N/A 12/19/2017    Procedure: ESOPHAGOGASTRODUODENOSCOPY (EGD) with biopsies;  Surgeon: Gael Romero MD;  Location: [facility];  Service:      ESOPHAGOSCOPY, GASTROSCOPY, DUODENOSCOPY (EGD), COMBINED N/A 3/8/2022    Procedure: ESOPHAGOGASTRODUODENOSCOPY, WITH BIOPSY;  Surgeon: Paddy Saldivar DO;  Location:  GI     H STATISTIC PICC LINE INSERTION >5YR, FAILED Bilateral 10/28/2021    L sided SVC     HAND SURGERY Left      HC REVISE MEDIAN N/CARPAL TUNNEL SURG  09/21/2016    Procedure: OPEN RIGHT CARPAL TUNNEL RELEASE CORTISONE INJECTION RIGHT THUMB;  Surgeon: Duong Santoyo MD;  Location: [facility];  Service: Orthopedics     INCISION AND DRAINAGE CHEST WASHOUT, COMBINED N/A 03/21/2019    Procedure: Incision And Drainage; Evacuation Right Chest Wall Hematoma;  Surgeon: Dewayne House MD;  [facility]     INSERT INTRAAORTIC BALLOON PUMP Left 02/19/2019    Procedure: Insert left  Subclavian Balloon Pump,  Removal Right femoral arterial balloom pump sheath;  Surgeon: Dewayne House MD;  [facility]     INSERT INTRAAORTIC BALLOON PUMP Left 02/21/2019    Procedure: SUBCLAVIAN BALLOON PUMP PLACEMENT;  Surgeon: Ben White MD;  Location: [facility]     INSERT INTRACORONARY STENT      x2     IR PICC PLACEMENT > 5 YRS OF AGE  05/08/2019     IR PICC PLACEMENT > 5 YRS OF AGE  9/21/2024     TRANSPLANT HEART RECIPIENT N/A 02/24/2019    Procedure: TRANSPLANT HEART RECIPIENT;  Surgeon: Dewayne House MD;  Location: [facility]       ALLERGIES     Allergies   Allergen Reactions     Hydromorphone Other (See Comments)     Significant Delirium      Adhesive Tape Blisters     Tegaderm causes bruises, blisters and extreme irritation.     Lisinopril Other (See Comments)     hypotension     Quinolones Other (See Comments)     Would not rx quinolones until QTc interval improved.      Tobramycin Other (See Comments)     Would not use aminoglycosides as his kidney function is very borderline     Codeine Nausea       FAMILY HISTORY:  Family History   Problem Relation Age of Onset     Skin Cancer Mother      Endometrial Cancer Mother      Osteoporosis Mother      Hypertension Father      Endometrial Cancer Maternal Grandmother      Hip fracture No family hx of      Nephrolithiasis No family hx of        SOCIAL HISTORY:  Social History     Socioeconomic History     Marital status: Single   Tobacco Use     Smoking status: Former     Current packs/day: 0.00     Average packs/day: 1 pack/day for 28.8 years (28.8 ttl pk-yrs)     Types: Cigarettes     Start date: 1980     Quit date: 2008     Years since quittin.4     Passive exposure: Past     Smokeless tobacco: Never   Vaping Use     Vaping status: Never Used   Substance and Sexual Activity     Alcohol use: Yes     Alcohol/week: 1.0 - 2.0 standard drink of alcohol     Types: 1 - 2 Cans of beer per week     Drug use: No   Social History Narrative    Everton is a retired . [place] He has no lawn care responsibilities. He will be staying with his folks during his post-hospital early transplant care time. No history of TB exposures.     He works part-time at [employer].     Social Drivers of Health     Financial Resource Strain: Low Risk  (2024)    Financial Resource Strain      Within the past 12 months, have you or your family members you live with been unable to get utilities (heat, electricity) when it was really needed?: No   Food Insecurity: Low Risk  (2024)    Food Insecurity      Within the past 12 months, did you worry that your food would  run out before you got money to buy more?: No      Within the past 12 months, did the food you bought just not last and you didn t have money to get more?: No   Transportation Needs: Low Risk  (9/21/2024)    Transportation Needs      Within the past 12 months, has lack of transportation kept you from medical appointments, getting your medicines, non-medical meetings or appointments, work, or from getting things that you need?: No   Interpersonal Safety: Low Risk  (9/21/2024)    Interpersonal Safety      Do you feel physically and emotionally safe where you currently live?: Yes      Within the past 12 months, have you been hit, slapped, kicked or otherwise physically hurt by someone?: No      Within the past 12 months, have you been humiliated or emotionally abused in other ways by your partner or ex-partner?: No   Housing Stability: High Risk (9/21/2024)    Housing Stability      Do you have housing? : No      Are you worried about losing your housing?: No       ROS:   Constitutional: No fever, chills, or sweats. No weight gain/loss   ENT: No visual disturbance, ear ache, epistaxis, sore throat  Allergies/Immunologic: Negative.   Respiratory: No cough, hemoptysia  Cardiovascular: As per HPI  GI: No nausea, vomiting, hematemesis, melena, or hematochezia  : No urinary frequency, dysuria, or hematuria  Integument: Negative  Psychiatric: Negative  Neuro: Negative  Endocrinology: Negative   Musculoskeletal: Negative    EXAM:  /89 (BP Location: Right arm, Patient Position: Chair, Cuff Size: Adult Large)   Pulse 104   Wt 93.9 kg (207 lb)   SpO2 99%   BMI 31.47 kg/m    In general, the patient is a pleasant male in no apparent distress.    HEENT: NC/AT.  PERRLA.  EOMI.  Sclerae white, not injected.  Nares clear.  Pharynx without erythema or exudate.  Dentition intact.    Neck: No adenopathy.  No thyromegaly. Carotids +4/4 bilaterally without bruits.  No jugular venous distension.   Heart: RRR. Normal S1, S2  splits physiologically. No murmur, rub, click, or gallop. The PMI is in the 5th ICS in the midclavicular line. There is no heave.    Lungs: CTA.  No ronchi, wheezes, rales.  No dullness to percussion.   Abdomen: Soft, nontender, nondistended. No organomegaly.  No bruits.   Extremities: No clubbing, cyanosis, or edema.  The pulses are +4/4 at the radial, brachial, femoral, popliteal, DP, and PT sites bilaterally.  No bruits are noted.  Neurologic: Alert and oriented to person/place/time, normal speech, gait and affect  Skin: No petechiae, purpura or rash.    Labs:  LIPID RESULTS:  Lab Results   Component Value Date    CHOL 243 (H) 02/19/2025    CHOL 255 (H) 03/17/2021    HDL 91 02/19/2025    HDL 89 03/17/2021     (H) 02/19/2025     (H) 03/17/2021    TRIG 140 02/19/2025    TRIG 128 03/17/2021    NHDL 152 (H) 02/19/2025    NHDL 166 (H) 03/17/2021       LIVER ENZYME RESULTS:  Lab Results   Component Value Date    AST 32 02/19/2025    AST 48 (H) 03/17/2021    ALT 27 02/19/2025    ALT 49 03/17/2021       CBC RESULTS:  Lab Results   Component Value Date    WBC 3.5 (L) 02/19/2025    WBC 5.0 04/09/2021    RBC 5.05 02/19/2025    RBC 4.61 04/09/2021    HGB 14.1 02/19/2025    HGB 11.2 (A) 04/09/2021    HCT 42.1 02/19/2025    HCT 34.6 04/09/2021    MCV 83 02/19/2025    MCV 75 04/09/2021    MCH 27.9 02/19/2025    MCH 24.3 04/09/2021    MCHC 33.5 02/19/2025    MCHC 32.4 04/09/2021    RDW 14.4 02/19/2025    RDW 14.9 04/09/2021     02/19/2025     04/09/2021       BMP RESULTS:  Lab Results   Component Value Date     04/03/2025     03/17/2021    POTASSIUM 4.4 04/03/2025    POTASSIUM 4.4 05/28/2022    POTASSIUM 3.9 03/17/2021    CHLORIDE 102 04/03/2025    CHLORIDE 107 05/28/2022    CHLORIDE 106 03/17/2021    CO2 20 (L) 04/03/2025    CO2 24 05/28/2022    CO2 24 03/17/2021    ANIONGAP 15 04/03/2025    ANIONGAP 6 05/28/2022    ANIONGAP 8 03/17/2021    GLC 83 04/03/2025    GLC 97 05/28/2022    GLC 85  03/17/2021    BUN 43.3 (H) 04/03/2025    BUN 31 (H) 05/28/2022    BUN 32 (H) 03/17/2021    CR 2.30 (H) 04/03/2025    CR 1.80 (H) 03/17/2021    GFRESTIMATED 31 (L) 04/03/2025    GFRESTIMATED 34 (L) 06/22/2021    GFRESTIMATED 41 (L) 03/17/2021    GFRESTBLACK 41 (L) 06/22/2021    GFRESTBLACK 47 (L) 03/17/2021    RADAMES 9.1 04/03/2025    RADAMES 9.0 03/17/2021        A1C RESULTS:  Lab Results   Component Value Date    A1C 5.9 (H) 02/19/2025    A1C 5.8 (H) 02/12/2020       INR RESULTS:  Lab Results   Component Value Date    INR 1.17 (H) 09/23/2019    INR 1.22 (H) 05/08/2019       Data   cMRI 2/19/25  SUMMARY  ===================================================================  Clinical history: 59-year-old man with a heart transplant on 02/24/2019.  Comparison CMR: 04/18/2024  1. The LV is normal in cavity size and wall thickness. The global systolic function is normal. The LVEF is 56%. There are no regional wall motion abnormalities.  2. The RV is normal in cavity size. The global systolic function is normal. The RVEF is 66%.  3. Both atria are severely dilated in size.  4. There is no significant valvular disease.   5. Late gadolinium enhancement imaging shows no myocardial infarction, replacement fibrosis, or infiltration.  6. Regadenoson stress perfusion imaging shows no ischemia.  7. There is no pericardial effusion or thickening.  8. There is no intracardiac thrombus.  CONCLUSIONS: Orthotopic heart transplant recipient with LVEF of 56% and RVEF 66% without any LGE. Compared to the prior CMR dated 04/18/2024, there have been no significant changes.     TTE 9/21/24  Interpretation Summary  Left ventricular size, wall motion and function are normal. The ejection  fraction is 60-65%.  Mildly decreased right ventricular systolic function  The rhythm was sinus tachycardia.  Right ventricular systolic pressure could not be approximated due to  inadequate tricuspid regurgitation.  No hemodynamically significant valvular  abnormalities on 2D or color flow imaging.     cMRI 4/18/24  SUMMARY   ===================================================================  Clinical history: 59-year old male with a history of orthotopic heart transplantation in 2019. His last coronary angiogram in 02/2023 was devoid of coronary allograft vasculopathy. CMR for graft surveillance.  Comparison CMR: 02/23/2022  1. The LV is normal in cavity size and wall thickness. The global systolic function is normal. The LVEF is 54%. There are no regional wall motion abnormalities.  2. The RV is normal in cavity size. The global systolic function is normal. The RVEF is 54%.   3. The left atrium is mildly enlarged due to transplantation. The right atrium is normal in size.   4. There is no significant valvular disease.   5. Late gadolinium enhancement imaging shows a small burden of patchy, midmyocardial fibrosis in the basal inferoseptal segment. This is of unclear significance.  6. Regadenoson stress perfusion imaging shows no ischemia.  7.  There is no pericardial effusion or thickening.  8. There is no intracardiac thrombus.  CONCLUSIONS: Orthotopic heart transplant. Normal biventricular graft function without ischemia. there is a small burden of fibrosis of unclear significance. Upon direct visual comparison to the prior examinations dated 03/17/2021 and 02/23/2022, there are no significant changes in the graft function or the degree of fibrosis.      TTE 2/23/23  Interpretation Summary  Left ventricular size, wall motion and function are normal. The ejection  fraction is 55-60%.  On direct comparison, the global right ventricular function appears mildly  reduced compared to 2022, but TAPSE and S' values are similar.  No significant valve abnormalities.  The inferior vena cava is normal.  No pericardial effusion.     Coronary Angiogram/RHC 2/23/23  Coronary Findings     Diagnostic  Dominance: Right  Left Main   The vessel is large. There was 0% vessel disease.       Left Anterior Descending   The vessel is large. There was 0% vessel disease.      Left Circumflex   The vessel is moderate in size. There was 0% vessel disease.      First Obtuse Marginal Branch   The vessel is large.      Right Coronary Artery   The vessel is large. There was 0% vessel disease.          Intervention      No interventions have been documented.      Hemodynamics     Right Heart Catheterization:  /89/114 mmHg  HR 92 BPM    RA 7/11/7 mmHg  RV 40/7 mmHg  PA 40/19/28 mmHg  PCW 17/24/17 mmHg  Jaden CO 7.13 L/min Normal = 4.0-8.0 L/min  Jaden CI 3.64 L/min/m2 Normal = 2.5-4.0 L/min/m2  TD CO 6.93 L/min Normal = 4.0-8.0 L/min  TD CI 3.54 L/min/m2 Normal = 2.5-4.0 L/min/m2  PA sat 72.4%   Hgb 10.7 g/dL   PVR 1.71 Woods units  SVR 1021 dynes-sec/cm5      ECG 2/23/23 shows sinus rhythm with nonspecific intraventricular conduction block, no acute ST-T changes     RHC 2/23/22  RA: 10  RV: 31 (11)  PA: 30/14 (21)  PVR: 0.91  Jaden CO: 6.3    CI: 3.3  TDCO: 7.4       CI: 3.8      Angiogram 2/23/22  Left Main   The vessel is large. There was 0% vessel disease.   Left Anterior Descending   The vessel is large. There was 0% vessel disease.   Left Circumflex   The vessel is moderate in size. There was 0% vessel disease.   Right Coronary Artery   The vessel is large. There was 0% vessel disease.         Assessment and Plan:     I discussed the results with patient.  I disucssed the importance of a heart healthy diet and lifestyle.  Patient has not only mixed dyslipidemia but also a very high Lpa 187 mg/dl (normal Lpa < 30 mg/dl).  Eyond rosuvastatin.  Our goal is to lower LDL-chol < 70 mg/dl - we add zetia 10 mg/d and also Repatha 140 mg subcutaneously every 2 weeks.    Mixed dyslipidemia, LDL goal less than 70.  Lipid levels continue to be elevated above target despite high-dose rosuvastatin.  Will add Zetia 10 but suspect patient will need PCSK9 inhibitors.    Recheck fasting lipids within 3 months.  There is  medication in development to lower Lpa - normal is LPa < 30 mg/dl/    I discussed the results with patient.  I discussed he importance of a heart healthy diet and lifestyle.  I have personally reviewed imaging, labs and EKG     Follow the American Heart Association Diet and Lifestyle Recommendations:  -Limit saturated fat, trans fat, sodium, red meat, sweets and sugar-sweetened beverages. If you choose to eat red meat, compare labels and select the leanest cuts available.     I saw and evaluated patient with CV fellow. I examined patient with CV fellow. I discussed the results with patient and CV fellow. I discussed our plan with patient and CV fellow.  I agree with CV fellow's note and I edited the CV fellow's note to make it a more comprehensive document.     Stevie Marmolejo MD, PhD  Professor of Medicine  Division of Cardiology    CC  Patient Care Team:  Fredrick Wolff MD as PCP - General (Internal Medicine)  Jocelynn Jerez RN as Transplant Coordinator  Ric Shukla MD as MD (Infectious Diseases)  Agata Weathers MD as MD (INTERNAL MEDICINE - ENDOCRINOLOGY, DIABETES & METABOLISM)  Franko Preciado [facility] as Pharmacist (Pharmacist)  Jocelynn Jensen MD as Fellow (Student in organized health care education/training program)  Fredrick Wolff MD as Assigned PCP  Magan Hinson MD as MD (Dermatology)  Gael Merritt MD as Assigned Musculoskeletal Provider  Madison Lindsay MD as Assigned Heart and Vascular Provider  Stevie Marmolejo MD as MD (Cardiovascular Disease)  Fely Diamond APRN CNP as Nurse Practitioner (Dermatology)  Andressa Marx RN as Specialty Care Coordinator (Cardiology)  MADISON LINDSAY      Please do not hesitate to contact me if you have any questions/concerns.     Sincerely,     Stevie Marmolejo MD

## 2025-04-07 NOTE — TELEPHONE ENCOUNTER
Prior Authorization Retail Medication Request    Medication/Dose: Repatha 140 mg  New/renewal/insurance change PA/secondary ins. PA: NEW  Previously Tried and Failed:  Pt currently on maximum dosage of crestor, but lipids are not at goal. Next step would be to add PCSK9 inhibitor. Pt has HLD, s/p heart transplant, and vasculopathy of cardiac allograft.  Rationale:  See above    Insurance   Primary: Commercial  Insurance ID:  [id number]    Clinic Information  Preferred routing pool for dept communication: CARDIOLOGY GENERAL

## 2025-04-08 NOTE — TELEPHONE ENCOUNTER
Central Prior Authorization Team   Phone: [phone]    PA Initiation    Medication: Repatha 140mg/ml  Insurance Company: [employer] Phone- [phone] Fax- [phone]  Pharmacy Filling the Rx: [id number] [place] [facility] [place], [place] - [address] [place] [address] E  Filling Pharmacy Phone: [phone]  Filling Pharmacy Fax:    Start Date: 4/8/2025

## 2025-04-09 NOTE — TELEPHONE ENCOUNTER
Prior Authorization Approval    Authorization Effective Date: 4/8/2025  Authorization Expiration Date: 4/8/2026  Medication: Repatha 140mg/ml  Approved Dose/Quantity:   Reference #:     Insurance Company: [employer] Phone- [phone] Fax- [phone]  Expected CoPay:       CoPay Card Available:      Foundation Assistance Needed:    Which Pharmacy is filling the prescription (Not needed for infusion/clinic administered): [facility] [id number] [place] [address]  Pharmacy Notified:  yes  Patient Notified:  yes- Pharmacy will contact patient when ready to /ship

## 2025-04-10 DIAGNOSIS — D63.1 ANEMIA OF CHRONIC RENAL FAILURE: ICD-10-CM

## 2025-04-10 DIAGNOSIS — D84.9 IMMUNOSUPPRESSION-RELATED INFECTIOUS DISEASE: ICD-10-CM

## 2025-04-10 DIAGNOSIS — N18.32 CHRONIC KIDNEY DISEASE (CKD) STAGE G3B/A1, MODERATELY DECREASED GLOMERULAR FILTRATION RATE (GFR) BETWEEN 30-44 ML/MIN/1.73 SQUARE METER AND ALBUMINURIA CREATININE RATIO LESS THAN 30 MG/G (H): Primary | ICD-10-CM

## 2025-04-10 DIAGNOSIS — B99.8 IMMUNOSUPPRESSION-RELATED INFECTIOUS DISEASE: ICD-10-CM

## 2025-04-10 DIAGNOSIS — N18.9 ANEMIA OF CHRONIC RENAL FAILURE: ICD-10-CM

## 2025-04-10 DIAGNOSIS — I13.10 MALIGNANT HYPERTENSIVE HEART AND KIDNEY DISEASE WITHOUT HEART FAILURE AND WITH CHRONIC KIDNEY DISEASE STAGE I THROUGH STAGE IV, OR UNSPECIFIED(404.00): ICD-10-CM

## 2025-04-13 PROBLEM — E78.5 HLD (HYPERLIPIDEMIA): Status: ACTIVE | Noted: 2025-04-13

## 2025-04-14 ENCOUNTER — MYC MEDICAL ADVICE (OUTPATIENT)
Dept: CARDIOLOGY | Facility: CLINIC | Age: 60
End: 2025-04-14
Payer: COMMERCIAL

## 2025-04-14 DIAGNOSIS — D72.9 ABNORMAL WHITE BLOOD CELL (WBC) COUNT: Primary | ICD-10-CM

## 2025-04-14 NOTE — TELEPHONE ENCOUNTER
Called and left message for patient to schedule fasting labs in 3 months. Call back number given [phone].     Melvin Rowe   MICHAEL [facility] [place]- Cardiology   [address] [place] [id number]

## 2025-04-17 NOTE — TELEPHONE ENCOUNTER
Called and left message for patient to schedule fasting labs in 3 months. Call back number given [phone]. Sent Nydia Rowe   [facility]- Cardiology   [address] [place] [id number]

## 2025-05-01 ENCOUNTER — LAB (OUTPATIENT)
Dept: LAB | Facility: HOSPITAL | Age: 60
End: 2025-05-01
Payer: COMMERCIAL

## 2025-05-01 DIAGNOSIS — N18.32 CHRONIC KIDNEY DISEASE (CKD) STAGE G3B/A1, MODERATELY DECREASED GLOMERULAR FILTRATION RATE (GFR) BETWEEN 30-44 ML/MIN/1.73 SQUARE METER AND ALBUMINURIA CREATININE RATIO LESS THAN 30 MG/G (H): ICD-10-CM

## 2025-05-01 DIAGNOSIS — N18.9 ANEMIA OF CHRONIC RENAL FAILURE: ICD-10-CM

## 2025-05-01 DIAGNOSIS — Z94.1 TRANSPLANTED HEART (H): ICD-10-CM

## 2025-05-01 DIAGNOSIS — D63.1 ANEMIA OF CHRONIC RENAL FAILURE: ICD-10-CM

## 2025-05-01 DIAGNOSIS — B99.8 IMMUNOSUPPRESSION-RELATED INFECTIOUS DISEASE: ICD-10-CM

## 2025-05-01 DIAGNOSIS — D84.9 IMMUNOSUPPRESSION-RELATED INFECTIOUS DISEASE: ICD-10-CM

## 2025-05-01 DIAGNOSIS — I13.10 MALIGNANT HYPERTENSIVE HEART AND KIDNEY DISEASE WITHOUT HEART FAILURE AND WITH CHRONIC KIDNEY DISEASE STAGE I THROUGH STAGE IV, OR UNSPECIFIED(404.00): ICD-10-CM

## 2025-05-01 LAB
ALBUMIN MFR UR ELPH: 167 MG/DL
ALBUMIN SERPL BCG-MCNC: 4.2 G/DL (ref 3.5–5.2)
ANION GAP SERPL CALCULATED.3IONS-SCNC: 15 MMOL/L (ref 7–15)
BUN SERPL-MCNC: 40.3 MG/DL (ref 8–23)
CALCIUM SERPL-MCNC: 9.1 MG/DL (ref 8.8–10.4)
CHLORIDE SERPL-SCNC: 104 MMOL/L (ref 98–107)
CREAT SERPL-MCNC: 2.53 MG/DL (ref 0.67–1.17)
CREAT UR-MCNC: 99.2 MG/DL
EGFRCR SERPLBLD CKD-EPI 2021: 28 ML/MIN/1.73M2
ERYTHROCYTE [DISTWIDTH] IN BLOOD BY AUTOMATED COUNT: 14.3 % (ref 10–15)
GLUCOSE SERPL-MCNC: 102 MG/DL (ref 70–99)
HCO3 SERPL-SCNC: 21 MMOL/L (ref 22–29)
HCT VFR BLD AUTO: 34.6 % (ref 40–53)
HGB BLD-MCNC: 11.7 G/DL (ref 13.3–17.7)
MCH RBC QN AUTO: 28.5 PG (ref 26.5–33)
MCHC RBC AUTO-ENTMCNC: 33.8 G/DL (ref 31.5–36.5)
MCV RBC AUTO: 84 FL (ref 78–100)
PHOSPHATE SERPL-MCNC: 4.4 MG/DL (ref 2.5–4.5)
PLATELET # BLD AUTO: 164 10E3/UL (ref 150–450)
POTASSIUM SERPL-SCNC: 4.7 MMOL/L (ref 3.4–5.3)
PROT/CREAT 24H UR: 1.68 MG/MG CR (ref 0–0.2)
RBC # BLD AUTO: 4.11 10E6/UL (ref 4.4–5.9)
SIROLIMUS BLD-MCNC: 5 UG/L (ref 5–15)
SODIUM SERPL-SCNC: 140 MMOL/L (ref 135–145)
TME LAST DOSE: NORMAL H
TME LAST DOSE: NORMAL H
WBC # BLD AUTO: 4.8 10E3/UL (ref 4–11)

## 2025-05-01 PROCEDURE — 82947 ASSAY GLUCOSE BLOOD QUANT: CPT

## 2025-05-01 PROCEDURE — 80195 ASSAY OF SIROLIMUS: CPT

## 2025-05-01 PROCEDURE — 85014 HEMATOCRIT: CPT

## 2025-05-01 PROCEDURE — 84156 ASSAY OF PROTEIN URINE: CPT

## 2025-05-01 PROCEDURE — 36415 COLL VENOUS BLD VENIPUNCTURE: CPT

## 2025-05-21 ENCOUNTER — OFFICE VISIT (OUTPATIENT)
Dept: INTERNAL MEDICINE | Facility: CLINIC | Age: 60
End: 2025-05-21
Payer: COMMERCIAL

## 2025-05-21 VITALS
DIASTOLIC BLOOD PRESSURE: 82 MMHG | BODY MASS INDEX: 31.37 KG/M2 | WEIGHT: 207 LBS | HEART RATE: 108 BPM | TEMPERATURE: 98.4 F | HEIGHT: 68 IN | SYSTOLIC BLOOD PRESSURE: 140 MMHG | OXYGEN SATURATION: 98 % | RESPIRATION RATE: 16 BRPM

## 2025-05-21 DIAGNOSIS — Z94.1 HISTORY OF HEART TRANSPLANT (H): ICD-10-CM

## 2025-05-21 DIAGNOSIS — J45.20 MILD INTERMITTENT ASTHMA WITHOUT COMPLICATION: Chronic | ICD-10-CM

## 2025-05-21 DIAGNOSIS — H26.9 CATARACT OF RIGHT EYE, UNSPECIFIED CATARACT TYPE: ICD-10-CM

## 2025-05-21 DIAGNOSIS — I10 ESSENTIAL HYPERTENSION: ICD-10-CM

## 2025-05-21 DIAGNOSIS — E03.9 HYPOTHYROIDISM, UNSPECIFIED TYPE: ICD-10-CM

## 2025-05-21 DIAGNOSIS — Z01.818 PREOPERATIVE EXAMINATION: Primary | ICD-10-CM

## 2025-05-21 DIAGNOSIS — K51.90 ULCERATIVE COLITIS WITHOUT COMPLICATIONS, UNSPECIFIED LOCATION (H): Chronic | ICD-10-CM

## 2025-05-21 DIAGNOSIS — N18.32 CHRONIC KIDNEY DISEASE, STAGE 3B (H): ICD-10-CM

## 2025-05-21 PROCEDURE — G2211 COMPLEX E/M VISIT ADD ON: HCPCS | Performed by: INTERNAL MEDICINE

## 2025-05-21 PROCEDURE — 99214 OFFICE O/P EST MOD 30 MIN: CPT | Performed by: INTERNAL MEDICINE

## 2025-05-21 NOTE — PATIENT INSTRUCTIONS
You will need to sit still for at least 20 minutes for cataract surgery.  If you have a cough or are unable to sit still for at least 20 minutes, reschedule your cataract surgery.    Take the drops from your ophthalmologist as prescribed.    Continue on your current medications.  Take your usual morning medications with a sip of water on the morning of cataract surgery.  But do not have breakfast on the morning of cataract surgery.    See me this fall for health maintenance physical exam.

## 2025-05-21 NOTE — PROGRESS NOTES
Preoperative Evaluation  [facility]  [address] [address]  Phone: [phone]  Fax: [phone]  Primary Provider: Fredrick Wolff MD  Pre-op Performing Provider: Fredrick Wolff MD  May 21, 2025             5/21/2025   Surgical Information   What procedure is being done? Right cataract   Facility or Hospital where procedure/surgery will be performed: [facility]   Who is doing the procedure / surgery? Dr Marva Jaeger   Date of surgery / procedure: 6/5/2025   Time of surgery / procedure: Not yet set   Where do you plan to recover after surgery? at home alone     Fax number for surgical facility: [phone]    Assessment & Plan     The proposed surgical procedure is considered LOW risk.    Preoperative examination  Patient is cleared to proceed with right cataract surgery.  I discussed some risks of cataract surgery including lens slippage, glaucoma, infection and other risks.  Patient accepts risk and wishes to proceed.    I did emphasize that he would need to sit still for at least 20 minutes for the procedure and cannot have a cough with the procedure.    He just developed a sore throat today but that does progress to a URI with cough, he may need to reschedule his cataract surgery.    Patient's instructions:  You will need to sit still for at least 20 minutes for cataract surgery.  If you have a cough or are unable to sit still for at least 20 minutes, reschedule your cataract surgery.    Take the drops from your ophthalmologist as prescribed.    Continue on your current medications.  Take your usual morning medications with a sip of water on the morning of cataract surgery.  But do not have breakfast on the morning of cataract surgery.    See me this fall for health maintenance physical exam.      Cataract of right eye, unspecified cataract type  Decreased vision, needs cataract surgery    Mild intermittent asthma without complication  No  flare at this time.  Continue daily Flovent and as needed albuterol    Ulcerative colitis without complications, unspecified location (H)  No flare.  Patient is due for endoscopy surveillance later this summer.  Continue Prilosec    Chronic kidney disease, stage 3b (H)  Kidney function at baseline level.  Just saw his nephrologist recently.    He has been on a higher dose of losartan 100 mg a day since February.    He had his kidney labs checked in February 1 with baseline creatinine of 2.5 and potassium normal at 4.7.    Continue on losartan daily for renal protection and hypertension control.    Also on amlodipine    History of heart transplant (H)  Stable condition of heart transplant.  On immunosuppression.  Continue current immunosuppression and follow-up with the transplant team    Essential hypertension  Controlled.  Continue current medications    Hypothyroidism, unspecified type  Stable dose Synthroid.  Normal TSH in February 2025    History of dysthymia, controlled.  On Cymbalta    Mild sore throat just today.  Could be the start of a viral URI.  He is fully vaccinated.  No flare of asthma at this time.  He will assess his symptoms closer to the cataract surgery date and reschedule if cough                 Recommendation  Approval given to proceed with proposed procedure, without further diagnostic evaluation.        You Toscano is a 60 year old, presenting for the following:  Pre-Op Exam (Cataract surgery, Rt eye, Dr Jaeger, 6/5/25@ [facility])          5/21/2025     2:05 PM   Additional Questions   Roomed by Rosalind WILLSON   Accompanied by rafal     HPI: 60-year-old male status post cardiac transplant on immunosuppression with decreased vision in the right eye for greater than 6 months.  No sudden loss of vision.  Worse at night.      He has been active, able to walk more than 2 blocks without shortness of breath or chest pain.  No lower extremity edema.    Just today developed a sore throat, no  cough yet, but this may transform into a viral URI.  Usually does not have spring allergies.  No fever.  No headache.    No abdominal pain or flare of ulcerative colitis.        5/21/2025   Pre-Op Questionnaire   Have you ever had a heart attack or stroke? No   Have you ever had surgery on your heart or blood vessels, such as a stent placement, a coronary artery bypass, or surgery on an artery in your head, neck, heart, or legs? (!) YES    Do you have chest pain with activity? No   Do you have a history of heart failure? (!) YES    Do you currently have a cold, bronchitis or symptoms of other infection? No   Do you have a cough, shortness of breath, or wheezing? (!) YES he does not have a current cough but just developed a sore throat today.  No asthma flare today.   Do you or anyone in your family have previous history of blood clots? (!) YES    Do you or does anyone in your family have a serious bleeding problem such as prolonged bleeding following surgeries or cuts? No   Have you ever had problems with anemia or been told to take iron pills? (!) YES    Have you had any abnormal blood loss such as black, tarry or bloody stools? No   Have you ever had a blood transfusion? (!) YES   Have you ever had a transfusion reaction? No   Are you willing to have a blood transfusion if it is medically needed before, during, or after your surgery? Yes   Have you or any of your relatives ever had problems with anesthesia? (!) UNKNOWN    Do you have sleep apnea, excessive snoring or daytime drowsiness? No   Do you have any artifical heart valves or other implanted medical devices like a pacemaker, defibrillator, or continuous glucose monitor? No   Do you have artificial joints? No   Are you allergic to latex? No     Advance Care Planning    Full code    Preoperative Review of    reviewed - no record of controlled substances prescribed.          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 difficile diarrhea 09/23/2019     Priority: Medium    Transplanted heart (H) 05/30/2019     Priority: Medium     Added automatically from request for surgery [id number]      Central line clotted 05/24/2019     Priority: Medium    FRANKLIN (acute kidney injury) 05/24/2019     Priority: Medium    Sepsis due to Pseudomonas aeruginosa (H) 04/30/2019     Priority: Medium    Pulmonary nodules 04/30/2019     Priority: Medium    Compression fracture of thoracic vertebra (H) 04/30/2019     Priority: Medium    Abnormal white blood cell (WBC) count 04/26/2019     Priority: Medium    Debility 04/03/2019     Priority: Medium    Heart replaced by transplant (H) 02/24/2019     Priority: Medium     Donor CMV positive/ Recipient CMV positive.   Donor EBV positive/ Recipient EBV positive.     Donor NOT PHS higher risk         Iron deficiency anemia 10/18/2018     Priority: Medium    H/O congenital atrial septal defect (ASD) repair at age 3 10/04/2018     Priority: Medium    Ulcerative colitis (H) 10/04/2018     Priority: Medium    Hypothyroidism due to medication 10/04/2018      Priority: Medium    DJD (degenerative joint disease) - neck 10/04/2018     Priority: Medium    Pierce's esophagus 10/04/2018     Priority: Medium    Intermittent asthma without complication 10/04/2018     Priority: Medium    Chronic rhinitis 10/04/2018     Priority: Medium    Depression 10/04/2018     Priority: Medium      Past Medical History:   Diagnosis Date    Alcohol abuse     Arthritis     hands, neck    ASD (atrial septal defect)     s/p repair age 3    Asthma     Atrial fibrillation (H)     Pierce's esophagus 10/4/2018    Pierce's esophagus     Cataract     CHF (congestive heart failure) (H)     Chronic rhinitis 10/4/2018    Chronic systolic heart failure (H) 10/4/2018    Clotting disorder     Congenital cardiomyopathy in  (H)     Depression 10/4/2018    Depression     Diastolic dysfunction     DJD (degenerative joint disease)     neck    DJD (degenerative joint disease) - neck 10/4/2018    H/O congenital atrial septal defect (ASD) repair at age 3 10/4/2018    History of anesthesia complications     nausea    History of transfusion     Hypothyroidism     Hypothyroidism due to medication 10/4/2018    ICD (implantable cardioverter-defibrillator) in place     ICD, [employer] ; gen change 2018 10/4/2018    Intermittent asthma without complication 10/4/2018    Nonischemic cardiomyopathy (H) 10/4/2018    On amiodarone therapy 10/4/2018    Pacemaker     Paroxysmal atrial fibrillation (H) 10/4/2018    S/P ablation of atrial fibrillation 10/4/2018    Systolic heart failure (H)     Ulcerative colitis (H)     Ulcerative colitis (H)     Ventricular tachycardia (H) 10/4/2018    Ventricular tachycardia (H)      Past Surgical History:   Procedure Laterality Date    ABLATION OF DYSRHYTHMIC FOCUS      for A fib    AICD, DUAL CHAMBER      ASD REPAIR  1968    BRONCHOSCOPY (RIGID OR FLEXIBLE), DIAGNOSTIC N/A 2019    Procedure: BRONCHOSCOPY, WITH BAL;  Surgeon: Margot Chiang MD;   Location:  GI    CARDIAC DEFIBRILLATOR PLACEMENT      x2--last was Feb 2018    COLONOSCOPY N/A 02/20/2020    Procedure: COLONOSCOPY, WITH POLYPECTOMY AND BIOPSY;  Surgeon: Ranjeet Cooper MD;  Location:  GI    COLONOSCOPY N/A 02/05/2015    Procedure: COLONOSCOPY with biopsy;  Surgeon: Fernie Akers MD;  Location: [facility];  Service:     COLONOSCOPY N/A 12/19/2017    Procedure: COLONOSCOPY with biopsies and polypectomies using snare;  Surgeon: Gael Romero MD;  Location: [facility];  Service:     COLONOSCOPY N/A 3/8/2022    Procedure: COLONOSCOPY, WITH POLYPECTOMY AND BIOPSY;  Surgeon: Paddy Saldivar DO;  Location:  GI    CV CORONARY ANGIOGRAM N/A 02/12/2020    Procedure: CV CORONARY ANGIOGRAM;  Surgeon: Isiah Mcallister MD;  Location:  HEART CARDIAC CATH LAB    CV CORONARY ANGIOGRAM N/A 03/17/2021    Procedure: CV CORONARY ANGIOGRAM;  Surgeon: Santino Mckeon MD;  [facility]    CV CORONARY ANGIOGRAM N/A 2/23/2022    Procedure: CV CORONARY ANGIOGRAM;  Surgeon: vYes Rodrigues MD;  [facility]    CV CORONARY ANGIOGRAM N/A 2/23/2023    Procedure: Coronary Angiogram;  Surgeon: Santino Mckeon MD;  Location:  [facility]ARD[facility]AC CATH LAB    CV HEART BIOPSY N/A 03/04/2019    Procedure: Heart Biopsy;  Surgeon: Abhijeet Titus MD;  [facility]    CV HEART BIOPSY N/A 03/25/2019    Procedure: Heart Biopsy;  Surgeon: Yves Rodrigues MD;  [facility]    CV HEART BIOPSY N/A 03/28/2019    Procedure: Heart Cath Heart Biopsy;  Surgeon: Yves Rodrigues MD;  [facility]    CV HEART BIOPSY N/A 04/10/2019    Procedure: CV HEART BIOPSY;  Surgeon: Isiah Mcallister MD;  [facility]    CV HEART BIOPSY N/A 04/24/2019    Procedure: CV HEART BIOPSY;  Surgeon: Isiah Mcallister MD;  [facility]    CV HEART BIOPSY N/A  05/08/2019    Procedure: CV HEART BIOPSY;  Surgeon: Isiah Mcallister MD;  Location:  [facility] CARDIAC CATH LAB    CV HEART BIOPSY N/A 06/04/2019    Procedure: CV HEART BIOPSY;  Surgeon: Alton Solomon MD;  [facility]    CV HEART BIOPSY N/A 05/22/2019    Procedure: CV HEART BIOPSY;  Surgeon: Yves Rodrigues MD;  Location:  [facility] CARDIAC CATH LAB    CV HEART BIOPSY N/A 07/17/2019    Procedure: CV HEART BIOPSY;  Surgeon: Isiah Mcallister MD;  Location:  HE[facility] CARDIAC CATH LAB    CV HEART BIOPSY N/A 08/13/2019    Procedure: CV HEART BIOPSY;  Surgeon: Jackson Patten MD;  Location:  HEART CARDIAC CATH LAB    CV HEART BIOPSY N/A 10/15/2019    Procedure: CV HEART BIOPSY;  Surgeon: Santino Mckeon MD;  Location:  HE[facility] CARDIAC CATH LAB    CV HEART BIOPSY N/A 02/12/2020    Procedure: CV HEART BIOPSY;  Surgeon: Isiah Mcallister MD;  Location:  HEART CARDIAC CATH LAB    CV HEART BIOPSY N/A 05/05/2020    Procedure: CV HEART BIOPSY;  Surgeon: Yves Rodrigues MD;  Location:  HEART CARDIAC CATH LAB    CV HEART BIOPSY N/A 03/17/2021    Procedure: CV HEART BIOPSY;  Surgeon: Santino Mckeon MD;  Location:  HEART CARDIAC CATH LAB    CV HEART BIOPSY N/A 5/10/2022    Procedure: Heart Biopsy;  Surgeon: Yves Rodrigues MD;  Location:  HEART CARDIAC CATH LAB    CV INTRA AORTIC BALLOON N/A 02/18/2019    Procedure: Intra-Aortic Balloon;  Surgeon: Alton Solomon MD;  Location:  HEART CARDIAC CATH LAB    CV INTRA AORTIC BALLOON N/A 02/20/2019    Procedure: Replace subclavian IABP;  Surgeon: Yves Rodrigues MD;  Location:  HEART CARDIAC CATH LAB    CV INTRAVASULAR ULTRASOUND N/A 02/12/2020    Procedure: CV INTRAVASCULAR ULTRASOUND;  Surgeon: Isiah Mcallister MD;  Location:  HEART CARDIAC CATH LAB    CV LEFT HEART CATH N/A 03/19/2019    Procedure: Left Heart Cath;  Surgeon: Yves Rodrigues MD;  Location:  HE[facility] CARDIAC CATH LAB    CV  LEFT HEART CATH N/A 02/12/2020    Procedure: Left Heart Cath;  Surgeon: Isiah Mcallister MD;  [facility]    CV MYOCARDIAL BIOPSY N/A 03/19/2019    Procedure: RHC/HBx - Femoral access for 3/19 per Nimisha GONZALEZ;  Surgeon: Yves oRdrigues MD;  [facility]    CV MYOCARDIAL BIOPSY N/A 03/11/2019    Procedure: ADD ON RHC/HBX;  Surgeon: Alton Solomon MD;  Location:  HEART CARDIAC CATH LAB    CV RIGHT HEART CATH MEASUREMENTS RECORDED N/A 03/25/2019    Procedure: Right Heart Cath;  Surgeon: Yves Rodrigues MD;  Location:  HEART CARDIAC CATH LAB    CV RIGHT HEART CATH MEASUREMENTS RECORDED N/A 03/28/2019    Procedure: Heart Cath Right Heart Cath;  Surgeon: Yves Rodrigues MD;  Location:  HEART CARDIAC CATH LAB    CV RIGHT HEART CATH MEASUREMENTS RECORDED N/A 04/24/2019    Procedure: CV RIGHT HEART CATH;  Surgeon: Isiah Mcallister MD;  [facility]    CV RIGHT HEART CATH MEASUREMENTS RECORDED N/A 06/04/2019    Procedure: CV RIGHT HEART CATH;  Surgeon: Alton Solomon MD;  Location:  HEART CARDIAC CATH LAB    CV RIGHT HEART CATH MEASUREMENTS RECORDED N/A 05/22/2019    Procedure: CV RIGHT HEART CATH;  Surgeon: Yves Rodrigues MD;  Location:  HEART CARDIAC CATH LAB    CV RIGHT HEART CATH MEASUREMENTS RECORDED N/A 08/13/2019    Procedure: CV RIGHT HEART CATH;  Surgeon: Jackson Patten MD;  Location:  HEART CARDIAC CATH LAB    CV RIGHT HEART CATH MEASUREMENTS RECORDED N/A 10/15/2019    Procedure: CV RIGHT HEART CATH;  Surgeon: Santino Mckeon MD;  Location:  HEART CARDIAC CATH LAB    CV RIGHT HEART CATH MEASUREMENTS RECORDED N/A 02/12/2020    Procedure: CV RIGHT HEART CATH;  Surgeon: Isiah Mcallister MD;  Location:  HEART CARDIAC CATH LAB    CV RIGHT HEART CATH MEASUREMENTS RECORDED N/A 03/17/2021    Procedure: CV RIGHT HEART CATH;  Surgeon: Santino Mckeon MD;  Location: [facility] CARDIAC  CATH LAB    CV RIGHT HEART CATH MEASUREMENTS RECORDED N/A 2/23/2022    Procedure: CV RIGHT HEART CATH;  Surgeon: Yves Rodrigues MD;  Location:  HEART CARDIAC CATH LAB    CV RIGHT HEART CATH MEASUREMENTS RECORDED N/A 5/10/2022    Procedure: Right Heart Catheterization;  Surgeon: Yves Rodrigues MD;  Location:  HEART CARDIAC CATH LAB    CV RIGHT HEART CATH MEASUREMENTS RECORDED N/A 2/23/2023    Procedure: Right Heart Catheterization;  Surgeon: Santino Mckeon MD;  Location:  HEART CARDIAC CATH LAB    ESOPHAGOSCOPY, GASTROSCOPY, DUODENOSCOPY (EGD), COMBINED N/A 12/19/2017    Procedure: ESOPHAGOGASTRODUODENOSCOPY (EGD) with biopsies;  Surgeon: Gael Romero MD;  Location: [facility];  Service:     ESOPHAGOSCOPY, GASTROSCOPY, DUODENOSCOPY (EGD), COMBINED N/A 3/8/2022    Procedure: ESOPHAGOGASTRODUODENOSCOPY, WITH BIOPSY;  Surgeon: Paddy Saldivar DO;  Location:  GI    H STATISTIC PICC LINE INSERTION >5YR, FAILED Bilateral 10/28/2021    L sided SVC    HAND SURGERY Left     HC REVISE MEDIAN N/CARPAL TUNNEL SURG  09/21/2016    Procedure: OPEN RIGHT CARPAL TUNNEL RELEASE CORTISONE INJECTION RIGHT THUMB;  Surgeon: Duong Santoyo MD;  Location: [facility] OR;  Service: Orthopedics    INCISION AND DRAINAGE CHEST WASHOUT, COMBINED N/A 03/21/2019    Procedure: Incision And Drainage; Evacuation Right Chest Wall Hematoma;  Surgeon: Dewayne House MD;  [facility]    INSERT INTRAAORTIC BALLOON PUMP Left 02/19/2019    Procedure: Insert left  Subclavian Balloon Pump,  Removal Right femoral arterial balloom pump sheath;  Surgeon: Dewayne House MD;  [facility]    INSERT INTRAAORTIC BALLOON PUMP Left 02/21/2019    Procedure: SUBCLAVIAN BALLOON PUMP PLACEMENT;  Surgeon: Ben White MD;  [facility]    INSERT INTRACORONARY STENT      x2    IR PICC PLACEMENT > 5 YRS OF AGE  05/08/2019    IR PICC PLACEMENT > 5 YRS OF AGE  9/21/2024    TRANSPLANT HEART RECIPIENT N/A 02/24/2019     Procedure: TRANSPLANT HEART RECIPIENT;  Surgeon: Dewayne House MD;  [facility]     Current Outpatient Medications   Medication Sig Dispense Refill    acetaminophen (TYLENOL) 325 MG tablet Take 2 tablets (650 mg) by mouth every 4 hours as needed for mild pain      albuterol (PROAIR HFA/PROVENTIL HFA/VENTOLIN HFA) 108 (90 Base) MCG/ACT inhaler INHALE 2 PUFFS INTO THE LUNGS EVERY 6 HOURS AS NEEDED FOR SHORTNESS OF BREATH OR WHEEZING. 8.5 g 5    amLODIPine (NORVASC) 5 MG tablet Take 2 tablets (10 mg) by mouth daily. 180 tablet 11    aspirin (ASA) 81 MG chewable tablet Take 1 tablet (81 mg) by mouth daily      azaTHIOprine 75 MG TABS Take 75 mg by mouth daily. PLEASE CONFIRM THE DOSE WITH YOUR TRANSPLANT TEAM 90 tablet 3    calcium carbonate (OS-RADAMES) 500 MG tablet Take 1 tablet by mouth daily.      cetirizine (ZYRTEC) 10 MG tablet Take 10 mg by mouth daily      clindamycin (CLINDAMAX) 1 % external gel Apply twice daily as needed for acne on the spots 60 g 11    DULoxetine (CYMBALTA) 20 MG capsule TAKE 1 CAPSULE BY MOUTH EVERY DAY 90 capsule 3    evolocumab (REPATHA) 140 MG/ML prefilled autoinjector Inject 1 mL (140 mg) subcutaneously every 14 days. 6 mL 3    fluticasone (FLONASE) 50 MCG/ACT nasal spray Spray 2 sprays into both nostrils daily.      fluticasone (FLOVENT HFA) 220 MCG/ACT inhaler INHALE 2 PUFFS INTO THE LUNGS TWICE A DAY 12 g 3    Fluticasone Propionate, Inhal, (FLUTICASONE PROPIONATE DISKUS) 100 MCG/ACT AEPB INHALE 1 PUFF INTO THE LUNGS EVERY 12 HOURS. 60 each 3    levothyroxine (SYNTHROID/LEVOTHROID) 75 MCG tablet TAKE 1 TABLET BY MOUTH EVERY DAY 90 tablet 2    losartan (COZAAR) 100 MG tablet Take 1 tablet (100 mg) by mouth daily. 90 tablet 3    melatonin 3 MG tablet Take 3 mg by mouth nightly as needed for sleep.      multivitamin w/minerals (THERA-VIT-M) tablet Take 1 tablet by mouth daily      omeprazole (PRILOSEC) 40 MG DR capsule TAKE 1 CAPSULE BY MOUTH EVERY DAY 90 capsule 4     RESTASIS 0.05 % ophthalmic emulsion Place 1 drop into both eyes 2 times daily as needed for dry eyes.      rosuvastatin (CRESTOR) 40 MG tablet Take 1 tablet (40 mg) by mouth daily. 90 tablet 3    sildenafil (VIAGRA) 50 MG tablet Take 1 tablet (50 mg) by mouth daily as needed (Erectile dysfunction) 10 tablet 11    sirolimus (GENERIC EQUIVALENT) 0.5 MG tablet Take 1 tablet (0.5 mg) by mouth daily. Total dose 1.5 mg daily. 90 tablet 3    sirolimus (GENERIC EQUIVALENT) 1 MG tablet Take 1 tablet (1 mg) by mouth daily. Total dose 1.5 mg daily. 90 tablet 3    tadalafil (CIALIS) 10 MG tablet Take 1 tablet (10 mg) by mouth daily as needed (Erectile dysfunction) 10 tablet 3    mometasone furoate (ASMANEX HFA) 100 MCG/ACT inhaler INHALE 2 PUFFS INTO THE LUNGS TWICE A DAY (Patient not taking: Reported on 2025) 13 g 3       Allergies   Allergen Reactions    Hydromorphone Other (See Comments)     Significant Delirium    Adhesive Tape Blisters     Tegaderm causes bruises, blisters and extreme irritation.    Lisinopril Other (See Comments)     hypotension    Quinolones Other (See Comments)     Would not rx quinolones until QTc interval improved.     Tobramycin Other (See Comments)     Would not use aminoglycosides as his kidney function is very borderline    Codeine Nausea        Social History     Tobacco Use    Smoking status: Former     Current packs/day: 0.00     Average packs/day: 1 pack/day for 28.8 years (28.8 ttl pk-yrs)     Types: Cigarettes     Start date: 1980     Quit date: 2008     Years since quittin.5     Passive exposure: Past    Smokeless tobacco: Never   Substance Use Topics    Alcohol use: Yes     Alcohol/week: 1.0 - 2.0 standard drink of alcohol     Types: 1 - 2 Cans of beer per week     Family History   Problem Relation Age of Onset    Skin Cancer Mother     Endometrial Cancer Mother     Osteoporosis Mother     Hypertension Father     Endometrial Cancer Maternal Grandmother     Hip fracture  "No family hx of     Nephrolithiasis No family hx of      History   Drug Use No             Review of Systems  Constitutional, neuro, ENT, endocrine, pulmonary, cardiac, gastrointestinal, genitourinary, musculoskeletal, integument and psychiatric systems are negative, except as otherwise noted.    Objective    BP (!) 140/82   Pulse 108   Temp 98.4  F (36.9  C)   Resp 16   Ht 1.727 m (5' 8\")   Wt 93.9 kg (207 lb)   SpO2 98%   BMI 31.47 kg/m     Estimated body mass index is 31.47 kg/m  as calculated from the following:    Height as of this encounter: 1.727 m (5' 8\").    Weight as of this encounter: 93.9 kg (207 lb).  Physical Exam  Alert and oriented x 3.  Appears well.  Pupils irises equal and reactive.  No periorbital inflammation.  Pharynx shows some mild diffuse erythema but no exudate.  Teeth in good condition.  No cervical adenopathy or JVD.  Lungs are clear to auscultation without wheezing or crackles.  Heart is tachycardic which is baseline for his transplanted heart but no murmur rub or gallop.  Abdomen is nonobese nontender no ankle edema    Recent Labs   Lab Test 05/01/25  1511 04/03/25  1515 02/19/25  0757   HGB 11.7*  --  14.1     --  176    137 136   POTASSIUM 4.7 4.4 4.8   CR 2.53* 2.30* 2.07*   A1C  --   --  5.9*        Diagnostics  No labs were ordered during this visit.  Recent Results (from the past 720 hours)   CBC with platelets    Collection Time: 05/01/25  3:11 PM   Result Value Ref Range    WBC Count 4.8 4.0 - 11.0 10e3/uL    RBC Count 4.11 (L) 4.40 - 5.90 10e6/uL    Hemoglobin 11.7 (L) 13.3 - 17.7 g/dL    Hematocrit 34.6 (L) 40.0 - 53.0 %    MCV 84 78 - 100 fL    MCH 28.5 26.5 - 33.0 pg    MCHC 33.8 31.5 - 36.5 g/dL    RDW 14.3 10.0 - 15.0 %    Platelet Count 164 150 - 450 10e3/uL   Sirolimus by Tandem Mass Spectrometry    Collection Time: 05/01/25  3:11 PM   Result Value Ref Range    Sirolimus by Tandem Mass Spectrometry 5.0 5.0 - 15.0 ug/L    Sirolimus Last Dose Date " 4/30/2025     Sirolimus Last Dose Time  4:30 PM    Protein  random urine    Collection Time: 05/01/25  3:11 PM   Result Value Ref Range    Total Protein Urine mg/dL 167.0   mg/dL    Total Protein Urine mg/mg Creat 1.68 (H) 0.00 - 0.20 mg/mg Cr    Creatinine Urine mg/dL 99.2 mg/dL   Renal panel    Collection Time: 05/01/25  3:11 PM   Result Value Ref Range    Sodium 140 135 - 145 mmol/L    Potassium 4.7 3.4 - 5.3 mmol/L    Chloride 104 98 - 107 mmol/L    Carbon Dioxide (CO2) 21 (L) 22 - 29 mmol/L    Anion Gap 15 7 - 15 mmol/L    Glucose 102 (H) 70 - 99 mg/dL    Urea Nitrogen 40.3 (H) 8.0 - 23.0 mg/dL    Creatinine 2.53 (H) 0.67 - 1.17 mg/dL    GFR Estimate 28 (L) >60 mL/min/1.73m2    Calcium 9.1 8.8 - 10.4 mg/dL    Albumin 4.2 3.5 - 5.2 g/dL    Phosphorus 4.4 2.5 - 4.5 mg/dL      February 19, 2025 EKG was normal sinus tachycardia with right bundle branch block consistent with transplanted heart.    Revised Cardiac Risk Index (RCRI)  The patient has the following serious cardiovascular risks for perioperative complications:   - Serum Creatinine >2.0 mg/dl = 1 point     RCRI Interpretation: 1 point: Class II (low risk - 0.9% complication rate)         Signed Electronically by: Fredrick Wolff MD  A copy of this evaluation report is provided to the requesting physician.

## 2025-05-25 ENCOUNTER — HEALTH MAINTENANCE LETTER (OUTPATIENT)
Age: 60
End: 2025-05-25

## 2025-06-10 ENCOUNTER — TELEPHONE (OUTPATIENT)
Dept: TRANSPLANT | Facility: CLINIC | Age: 60
End: 2025-06-10
Payer: COMMERCIAL

## 2025-06-10 DIAGNOSIS — D72.9 ABNORMAL WHITE BLOOD CELL (WBC) COUNT: Primary | ICD-10-CM

## 2025-06-10 DIAGNOSIS — Z94.1 TRANSPLANTED HEART (H): ICD-10-CM

## 2025-06-10 NOTE — TELEPHONE ENCOUNTER
Patient Call: General  Route to LPN    Reason for call: Everton stated he has a couple of questions for Coordinator, regarding order needed to schedule colonoscopy. Will touch base on other question when coordinator call back.    Call back needed? Yes    Return Call Needed  Same as documented in contacts section  When to return call?: Same day: Route High Priority

## 2025-06-11 DIAGNOSIS — Z94.1 TRANSPLANTED HEART (H): ICD-10-CM

## 2025-06-11 DIAGNOSIS — D72.9 ABNORMAL WHITE BLOOD CELL (WBC) COUNT: Primary | ICD-10-CM

## 2025-06-11 DIAGNOSIS — K22.70 BARRETT'S ESOPHAGUS: ICD-10-CM

## 2025-06-11 ASSESSMENT — ENCOUNTER SYMPTOMS: NEW SYMPTOMS OF CORONARY ARTERY DISEASE: 0

## 2025-06-11 NOTE — TELEPHONE ENCOUNTER
Call returned. Orders in for colo (EGD orders already in) and lipid recheck after starting repatha. No further questions or complaints at this time.

## 2025-06-19 ENCOUNTER — TELEPHONE (OUTPATIENT)
Dept: GASTROENTEROLOGY | Facility: CLINIC | Age: 60
End: 2025-06-19
Payer: COMMERCIAL

## 2025-06-19 NOTE — TELEPHONE ENCOUNTER
"Endoscopy Scheduling Screen    Caller: patient    Have you had any respiratory illness or flu-like symptoms in the last 10 days?  No    What is your communication preference for Instructions and/or Bowel Prep?   Nydia    What insurance is in the chart?  Other:  [employer]     Ordering/Referring Provider:     MADISON VILLANUEVA      (If ordering provider performs procedure, schedule with ordering provider unless otherwise instructed. )    BMI: Estimated body mass index is 31.47 kg/m  as calculated from the following:    Height as of 5/21/25: 1.727 m (5' 8\").    Weight as of 5/21/25: 93.9 kg (207 lb).     Sedation Ordered  moderate sedation.   If patient BMI > 50 do not schedule in [facility].    If patient BMI > 45 do not schedule at [facility].    Are you taking methadone or Suboxone?  NO, No RN review required.    Have you been diagnosed and are being treated for severe PTSD or severe anxiety?  NO, No RN review required.    Are you taking any prescription medications for pain 3 or more times per week?   NO, No RN review required.    Do you have a history of malignant hyperthermia?  No    (Females) Are you currently pregnant?   No     Have you been diagnosed or told you have pulmonary hypertension?   No    Do you have an LVAD?  No    Have you been told you have moderate to severe sleep apnea?  No.    Have you been told you have COPD, asthma, or any other lung disease?  No    Has your doctor ordered any cardiac tests like echo, angiogram, stress test, ablation, or EKG, that you have not completed yet?  No    Do you  have a history of any heart conditions?  No -- history of heart tx 6 years ago     Have you ever had or are you waiting for an organ transplant?  Yes. Have you had or are on on the wait list for a heart/lung transplant? Yes, Hospital Only    Have you had a stroke or transient ischemic attack (TIA aka \"mini stroke\") in the last 2 years?   No.    Have you been diagnosed with or been told you have cirrhosis of the " "liver?   No.    Are you currently on dialysis?   No    Do you need assistance transferring?   No    BMI: Estimated body mass index is 31.47 kg/m  as calculated from the following:    Height as of 5/21/25: 1.727 m (5' 8\").    Weight as of 5/21/25: 93.9 kg (207 lb).     Is patients BMI > 40 and scheduling location [facility]?  No    Do you take an injectable or oral medication for weight loss or diabetes (excluding insulin)?  No    Do you take the medication Naltrexone?  No    Do you take blood thinners?  No       Prep   Are you currently on dialysis or do you have chronic kidney disease?  Yes ([facility] Prep)    Do you have a diagnosis of diabetes?  No    Do you have a diagnosis of cystic fibrosis (CF)?  No    On a regular basis do you go 3 -5 days between bowel movements?  No    BMI > 40?  No    Preferred Pharmacy:    [facility] [id number] [facility] [employer] - [facility], [address]  [address] [place] [address] E  [facility] [id number]  Phone: [phone] Fax: [phone]      Final Scheduling Details     Procedure scheduled  Colonoscopy / Upper endoscopy (EGD)    Surgeon:   Dennis     Date of procedure:  08/18/2025     Pre-OP / PAC:   No - Not required for this site.    Location  [facility] - Per exclusion criteria.    Sedation   Moderate Sedation - Per order.      Patient Reminders:   You will receive a call from a Nurse to review instructions and health history.  This assessment must be completed prior to your procedure.  Failure to complete the Nurse assessment may result in the procedure being cancelled.      On the day of your procedure, please designate an adult(s) who can drive you home stay with you for the next 24 hours. The medicines used in the exam will make you sleepy. You will not be able to drive.      You cannot take public transportation, ride share services, or non-medical taxi service without a responsible caregiver.  Medical transport services are allowed with the requirement that a responsible caregiver will receive you at your " destination.  We require that drivers and caregivers are confirmed prior to your procedure.

## 2025-07-03 ENCOUNTER — APPOINTMENT (OUTPATIENT)
Dept: LAB | Facility: HOSPITAL | Age: 60
End: 2025-07-03
Payer: COMMERCIAL

## 2025-07-05 ENCOUNTER — LAB (OUTPATIENT)
Dept: LAB | Facility: HOSPITAL | Age: 60
End: 2025-07-05
Payer: COMMERCIAL

## 2025-07-05 DIAGNOSIS — Z94.1 TRANSPLANTED HEART (H): ICD-10-CM

## 2025-07-05 LAB
CHOLEST SERPL-MCNC: 129 MG/DL
FASTING STATUS PATIENT QL REPORTED: YES
HDLC SERPL-MCNC: 72 MG/DL
LDLC SERPL CALC-MCNC: 37 MG/DL
NONHDLC SERPL-MCNC: 57 MG/DL
TRIGL SERPL-MCNC: 99 MG/DL

## 2025-07-05 PROCEDURE — 80061 LIPID PANEL: CPT

## 2025-07-05 PROCEDURE — 36415 COLL VENOUS BLD VENIPUNCTURE: CPT

## 2025-07-10 ENCOUNTER — OFFICE VISIT (OUTPATIENT)
Dept: DERMATOLOGY | Facility: CLINIC | Age: 60
End: 2025-07-10
Attending: STUDENT IN AN ORGANIZED HEALTH CARE EDUCATION/TRAINING PROGRAM
Payer: COMMERCIAL

## 2025-07-10 DIAGNOSIS — D18.01 CHERRY ANGIOMA: ICD-10-CM

## 2025-07-10 DIAGNOSIS — L70.0 ACNE VULGARIS: ICD-10-CM

## 2025-07-10 DIAGNOSIS — D22.9 MULTIPLE BENIGN NEVI: Primary | ICD-10-CM

## 2025-07-10 DIAGNOSIS — L81.4 LENTIGINES: ICD-10-CM

## 2025-07-10 DIAGNOSIS — D48.9 NEOPLASM OF UNCERTAIN BEHAVIOR: ICD-10-CM

## 2025-07-10 DIAGNOSIS — Z12.83 ENCOUNTER FOR SCREENING FOR MALIGNANT NEOPLASM OF SKIN: ICD-10-CM

## 2025-07-10 DIAGNOSIS — L82.1 SEBORRHEIC KERATOSES: ICD-10-CM

## 2025-07-10 DIAGNOSIS — Z94.1 TRANSPLANTED HEART (H): ICD-10-CM

## 2025-07-10 RX ORDER — CLINDAMYCIN PHOSPHATE 10 MG/G
GEL TOPICAL 2 TIMES DAILY
Qty: 60 G | Refills: 11 | Status: SHIPPED | OUTPATIENT
Start: 2025-07-10

## 2025-07-10 NOTE — LETTER
7/10/2025      Everton Larios  [address] [id number]      Dear Colleague,    Thank you for referring your patient, Everton Larios, to the [facility]. Please see a copy of my visit note below.    [facility] Dermatology Note  Encounter Date: Jul 10, 2025  Office Visit     Reviewed patients past medical history and pertinent chart review prior to patients visit today.     Dermatology Problem List:  NUB right forehead, shave biopsy 07/10/25 .   1. Hypertrophic scars, L dorsal hand, bilateral forearms  2. Benign nevi, back, arms, face  3. Seborrheic keratoses  4. Organ transplant patient on immunosuppression with tacrolimus and sirolimus. At increased risk for squamous cell carcinoma and melanoma.  5. Acne vulgaris    ____________________________________________    Assessment & Plan:     # Neoplasm of uncertain behavior:  right forehead  DDx includes BCC vs other. Shave biopsy today.    Procedure Note: Biopsy by shave technique  The risks and benefits of the procedure were described to the patient. These include but are not limited to bleeding, infection, scar, incomplete removal, and non-diagnostic biopsy. Verbal informed consent was obtained. The above site(s) was cleansed with an alcohol pad and injected with 1% lidocaine with epinephrine. Once anesthesia was obtained, a biopsy(ies) was performed with Gilette blade. The tissue(s) was placed in a labeled container(s) with formalin and sent to pathology. Hemostasis was achieved with aluminum chloride. Vaseline and a bandage were applied to the wound(s). The patient tolerated the procedure well and was given post biopsy care instructions.     -Acne of face, well-controlled with occasional use of clindamycin 1% gel.  Refills given to patient and sent to pharmacy.    # Benign skin findings including: seborrheic keratoses, cherry angioma, lentigines and benign nevi.   - No further intervention required. Patient  to report changes.   - Patient reassured of the benign nature of these lesions.    #Signs and Symptoms of non-melanoma skin cancer and ABCDEs of melanoma reviewed with patient. Patient encouraged to perform monthly self skin exams and educated on how to perform them. UV precautions reviewed with patient. Patient was asked about new or changing moles/lesions on body.     #Reviewed Sunscreen: Apply 20 minutes prior to going outdoors and reapply every two hours, when wet or sweating. We recommend using an SPF 30 or higher, and to use one that is water resistant.       Follow-up:  1 years for follow up full body skin exam, prn for new or changing lesions or new concerns    Kristi Diamond, ESTRELLITA  Dermatology     ____________________________________________    CC: No chief complaint on file.    HPI:  Mr. Everton Larios is a(n) 60 year old male who presents today as a return patient for a full body skin cancer screening. He would like refills of clindamycin gel for his acne.     Patient is otherwise feeling well, without additional skin concerns.     Physical Exam:  SKIN: Total skin excluding the genitalia areas was performed. The exam included the head/face, neck, both arms, chest, back, abdomen, both legs, digits, mons pubis, buttock and nails.   -a few inflammatory papules on the face  -right forehead, subtle slightly indurated papule with arborizing vessels on dermoscopy  -several 1-2mm red dome shaped symmetric papules scattered on the trunk  -multiple tan/brown flat round macules and raised papules scattered throughout trunk, extremities and head. No worrisome features for malignancy noted on examination.  -scattered tan, homogenous macules scattered on sun exposed areas of trunk, extremities and face.   -scattered waxy, stuck on tan/brown papules and patches on the trunk     - No other lesions of concern on areas examined.     Medications:  Current Outpatient Medications   Medication Sig Dispense Refill     acetaminophen  (TYLENOL) 325 MG tablet Take 2 tablets (650 mg) by mouth every 4 hours as needed for mild pain       albuterol (PROAIR HFA/PROVENTIL HFA/VENTOLIN HFA) 108 (90 Base) MCG/ACT inhaler INHALE 2 PUFFS INTO THE LUNGS EVERY 6 HOURS AS NEEDED FOR SHORTNESS OF BREATH OR WHEEZING. 8.5 g 5     amLODIPine (NORVASC) 5 MG tablet Take 2 tablets (10 mg) by mouth daily. 180 tablet 11     aspirin (ASA) 81 MG chewable tablet Take 1 tablet (81 mg) by mouth daily       azaTHIOprine 75 MG TABS Take 75 mg by mouth daily. PLEASE CONFIRM THE DOSE WITH YOUR TRANSPLANT TEAM 90 tablet 3     calcium carbonate (OS-RADAMES) 500 MG tablet Take 1 tablet by mouth daily.       cetirizine (ZYRTEC) 10 MG tablet Take 10 mg by mouth daily       clindamycin (CLINDAMAX) 1 % external gel Apply twice daily as needed for acne on the spots 60 g 11     DULoxetine (CYMBALTA) 20 MG capsule TAKE 1 CAPSULE BY MOUTH EVERY DAY 90 capsule 3     evolocumab (REPATHA) 140 MG/ML prefilled autoinjector Inject 1 mL (140 mg) subcutaneously every 14 days. 6 mL 3     fluticasone (FLONASE) 50 MCG/ACT nasal spray Spray 2 sprays into both nostrils daily.       fluticasone (FLOVENT HFA) 220 MCG/ACT inhaler INHALE 2 PUFFS INTO THE LUNGS TWICE A DAY 12 g 3     Fluticasone Propionate, Inhal, (FLUTICASONE PROPIONATE DISKUS) 100 MCG/ACT AEPB INHALE 1 PUFF INTO THE LUNGS EVERY 12 HOURS. 60 each 3     levothyroxine (SYNTHROID/LEVOTHROID) 75 MCG tablet TAKE 1 TABLET BY MOUTH EVERY DAY 90 tablet 2     losartan (COZAAR) 100 MG tablet Take 1 tablet (100 mg) by mouth daily. 90 tablet 3     melatonin 3 MG tablet Take 3 mg by mouth nightly as needed for sleep.       multivitamin w/minerals (THERA-VIT-M) tablet Take 1 tablet by mouth daily       omeprazole (PRILOSEC) 40 MG DR capsule TAKE 1 CAPSULE BY MOUTH EVERY DAY 90 capsule 4     RESTASIS 0.05 % ophthalmic emulsion Place 1 drop into both eyes 2 times daily as needed for dry eyes.       rosuvastatin (CRESTOR) 40 MG tablet Take 1 tablet (40 mg) by  mouth daily. 90 tablet 3     sildenafil (VIAGRA) 50 MG tablet Take 1 tablet (50 mg) by mouth daily as needed (Erectile dysfunction) 10 tablet 11     sirolimus (GENERIC EQUIVALENT) 0.5 MG tablet Take 1 tablet (0.5 mg) by mouth daily. Total dose 1.5 mg daily. 90 tablet 3     sirolimus (GENERIC EQUIVALENT) 1 MG tablet Take 1 tablet (1 mg) by mouth daily. Total dose 1.5 mg daily. 90 tablet 3     tadalafil (CIALIS) 10 MG tablet Take 1 tablet (10 mg) by mouth daily as needed (Erectile dysfunction) 10 tablet 3     No current facility-administered medications for this visit.      Past Medical History:   Patient Active Problem List   Diagnosis     H/O congenital atrial septal defect (ASD) repair at age 3     Ulcerative colitis (H)     Hypothyroidism due to medication     DJD (degenerative joint disease) - neck     Pierce's esophagus     Intermittent asthma without complication     Chronic rhinitis     Depression     Iron deficiency anemia     Heart replaced by transplant (H)     Debility     Abnormal white blood cell (WBC) count     Sepsis due to Pseudomonas aeruginosa (H)     Pulmonary nodules     Compression fracture of thoracic vertebra (H)     Central line clotted     FRANKLIN (acute kidney injury)     Transplanted heart (H)     C. difficile diarrhea     Encounter for therapeutic drug monitoring     Age-related osteoporosis with current pathological fracture with routine healing, subsequent encounter     Low TSH level     History of corticosteroid therapy     Status post coronary angiogram     Vasculopathy of cardiac allograft (H)     Chronic kidney disease, stage 3b (H)     MATA (dyspnea on exertion)     Tachycardia     History of heart transplant (H)     Fever, unspecified fever cause     Other fatigue     HLD (hyperlipidemia)     Past Medical History:   Diagnosis Date     Alcohol abuse      Arthritis     hands, neck     ASD (atrial septal defect)     s/p repair age 3     Asthma      Atrial fibrillation (H)      Pierce's  esophagus 10/4/2018     Pierce's esophagus      Cataract      CHF (congestive heart failure) (H)      Chronic rhinitis 10/4/2018     Chronic systolic heart failure (H) 10/4/2018     Clotting disorder      Congenital cardiomyopathy in  (H)      Depression 10/4/2018     Depression      Diastolic dysfunction      DJD (degenerative joint disease)     neck     DJD (degenerative joint disease) - neck 10/4/2018     H/O congenital atrial septal defect (ASD) repair at age 3 10/4/2018     History of anesthesia complications     nausea     History of transfusion      Hypothyroidism      Hypothyroidism due to medication 10/4/2018     ICD (implantable cardioverter-defibrillator) in place      ICD, [employer] ; gen change 2018 10/4/2018     Intermittent asthma without complication 10/4/2018     Nonischemic cardiomyopathy (H) 10/4/2018     On amiodarone therapy 10/4/2018     Pacemaker      Paroxysmal atrial fibrillation (H) 10/4/2018     S/P ablation of atrial fibrillation 10/4/2018     Systolic heart failure (H)      Ulcerative colitis (H)      Ulcerative colitis (H)      Ventricular tachycardia (H) 10/4/2018     Ventricular tachycardia (H)        CC Slime Lindsay MD  [address] [place] [id number] on close of this encounter.    Again, thank you for allowing me to participate in the care of your patient.        Sincerely,        TAI Allen CNP    Electronically signed

## 2025-07-10 NOTE — PROGRESS NOTES
[facility] Dermatology Note  Encounter Date: Jul 10, 2025  Office Visit     Reviewed patients past medical history and pertinent chart review prior to patients visit today.     Dermatology Problem List:  NUB right forehead, shave biopsy 07/10/25 .   1. Hypertrophic scars, L dorsal hand, bilateral forearms  2. Benign nevi, back, arms, face  3. Seborrheic keratoses  4. Organ transplant patient on immunosuppression with tacrolimus and sirolimus. At increased risk for squamous cell carcinoma and melanoma.  5. Acne vulgaris    ____________________________________________    Assessment & Plan:     # Neoplasm of uncertain behavior:  right forehead  DDx includes BCC vs other. Shave biopsy today.    Procedure Note: Biopsy by shave technique  The risks and benefits of the procedure were described to the patient. These include but are not limited to bleeding, infection, scar, incomplete removal, and non-diagnostic biopsy. Verbal informed consent was obtained. The above site(s) was cleansed with an alcohol pad and injected with 1% lidocaine with epinephrine. Once anesthesia was obtained, a biopsy(ies) was performed with Gilette blade. The tissue(s) was placed in a labeled container(s) with formalin and sent to pathology. Hemostasis was achieved with aluminum chloride. Vaseline and a bandage were applied to the wound(s). The patient tolerated the procedure well and was given post biopsy care instructions.     -Acne of face, well-controlled with occasional use of clindamycin 1% gel.  Refills given to patient and sent to pharmacy.    # Benign skin findings including: seborrheic keratoses, cherry angioma, lentigines and benign nevi.   - No further intervention required. Patient to report changes.   - Patient reassured of the benign nature of these lesions.    #Signs and Symptoms of non-melanoma skin cancer and ABCDEs of melanoma reviewed with patient. Patient encouraged to perform monthly self skin exams and  educated on how to perform them. UV precautions reviewed with patient. Patient was asked about new or changing moles/lesions on body.     #Reviewed Sunscreen: Apply 20 minutes prior to going outdoors and reapply every two hours, when wet or sweating. We recommend using an SPF 30 or higher, and to use one that is water resistant.       Follow-up:  1 years for follow up full body skin exam, prn for new or changing lesions or new concerns    Kristi Diamond CNP  Dermatology     ____________________________________________    CC: No chief complaint on file.    HPI:  Mr. Everton Larios is a(n) 60 year old male who presents today as a return patient for a full body skin cancer screening. He would like refills of clindamycin gel for his acne.     Patient is otherwise feeling well, without additional skin concerns.     Physical Exam:  SKIN: Total skin excluding the genitalia areas was performed. The exam included the head/face, neck, both arms, chest, back, abdomen, both legs, digits, mons pubis, buttock and nails.   -a few inflammatory papules on the face  -right forehead, subtle slightly indurated papule with arborizing vessels on dermoscopy  -several 1-2mm red dome shaped symmetric papules scattered on the trunk  -multiple tan/brown flat round macules and raised papules scattered throughout trunk, extremities and head. No worrisome features for malignancy noted on examination.  -scattered tan, homogenous macules scattered on sun exposed areas of trunk, extremities and face.   -scattered waxy, stuck on tan/brown papules and patches on the trunk     - No other lesions of concern on areas examined.     Medications:  Current Outpatient Medications   Medication Sig Dispense Refill    acetaminophen (TYLENOL) 325 MG tablet Take 2 tablets (650 mg) by mouth every 4 hours as needed for mild pain      albuterol (PROAIR HFA/PROVENTIL HFA/VENTOLIN HFA) 108 (90 Base) MCG/ACT inhaler INHALE 2 PUFFS INTO THE LUNGS EVERY 6 HOURS AS NEEDED FOR  SHORTNESS OF BREATH OR WHEEZING. 8.5 g 5    amLODIPine (NORVASC) 5 MG tablet Take 2 tablets (10 mg) by mouth daily. 180 tablet 11    aspirin (ASA) 81 MG chewable tablet Take 1 tablet (81 mg) by mouth daily      azaTHIOprine 75 MG TABS Take 75 mg by mouth daily. PLEASE CONFIRM THE DOSE WITH YOUR TRANSPLANT TEAM 90 tablet 3    calcium carbonate (OS-RADAMES) 500 MG tablet Take 1 tablet by mouth daily.      cetirizine (ZYRTEC) 10 MG tablet Take 10 mg by mouth daily      clindamycin (CLINDAMAX) 1 % external gel Apply twice daily as needed for acne on the spots 60 g 11    DULoxetine (CYMBALTA) 20 MG capsule TAKE 1 CAPSULE BY MOUTH EVERY DAY 90 capsule 3    evolocumab (REPATHA) 140 MG/ML prefilled autoinjector Inject 1 mL (140 mg) subcutaneously every 14 days. 6 mL 3    fluticasone (FLONASE) 50 MCG/ACT nasal spray Spray 2 sprays into both nostrils daily.      fluticasone (FLOVENT HFA) 220 MCG/ACT inhaler INHALE 2 PUFFS INTO THE LUNGS TWICE A DAY 12 g 3    Fluticasone Propionate, Inhal, (FLUTICASONE PROPIONATE DISKUS) 100 MCG/ACT AEPB INHALE 1 PUFF INTO THE LUNGS EVERY 12 HOURS. 60 each 3    levothyroxine (SYNTHROID/LEVOTHROID) 75 MCG tablet TAKE 1 TABLET BY MOUTH EVERY DAY 90 tablet 2    losartan (COZAAR) 100 MG tablet Take 1 tablet (100 mg) by mouth daily. 90 tablet 3    melatonin 3 MG tablet Take 3 mg by mouth nightly as needed for sleep.      multivitamin w/minerals (THERA-VIT-M) tablet Take 1 tablet by mouth daily      omeprazole (PRILOSEC) 40 MG DR capsule TAKE 1 CAPSULE BY MOUTH EVERY DAY 90 capsule 4    RESTASIS 0.05 % ophthalmic emulsion Place 1 drop into both eyes 2 times daily as needed for dry eyes.      rosuvastatin (CRESTOR) 40 MG tablet Take 1 tablet (40 mg) by mouth daily. 90 tablet 3    sildenafil (VIAGRA) 50 MG tablet Take 1 tablet (50 mg) by mouth daily as needed (Erectile dysfunction) 10 tablet 11    sirolimus (GENERIC EQUIVALENT) 0.5 MG tablet Take 1 tablet (0.5 mg) by mouth daily. Total dose 1.5 mg daily.  90 tablet 3    sirolimus (GENERIC EQUIVALENT) 1 MG tablet Take 1 tablet (1 mg) by mouth daily. Total dose 1.5 mg daily. 90 tablet 3    tadalafil (CIALIS) 10 MG tablet Take 1 tablet (10 mg) by mouth daily as needed (Erectile dysfunction) 10 tablet 3     No current facility-administered medications for this visit.      Past Medical History:   Patient Active Problem List   Diagnosis    H/O congenital atrial septal defect (ASD) repair at age 3    Ulcerative colitis (H)    Hypothyroidism due to medication    DJD (degenerative joint disease) - neck    Pierce's esophagus    Intermittent asthma without complication    Chronic rhinitis    Depression    Iron deficiency anemia    Heart replaced by transplant (H)    Debility    Abnormal white blood cell (WBC) count    Sepsis due to Pseudomonas aeruginosa (H)    Pulmonary nodules    Compression fracture of thoracic vertebra (H)    Central line clotted    FRANKLIN (acute kidney injury)    Transplanted heart (H)    C. difficile diarrhea    Encounter for therapeutic drug monitoring    Age-related osteoporosis with current pathological fracture with routine healing, subsequent encounter    Low TSH level    History of corticosteroid therapy    Status post coronary angiogram    Vasculopathy of cardiac allograft (H)    Chronic kidney disease, stage 3b (H)    MATA (dyspnea on exertion)    Tachycardia    History of heart transplant (H)    Fever, unspecified fever cause    Other fatigue    HLD (hyperlipidemia)     Past Medical History:   Diagnosis Date    Alcohol abuse     Arthritis     hands, neck    ASD (atrial septal defect)     s/p repair age 3    Asthma     Atrial fibrillation (H)     Pierce's esophagus 10/4/2018    Pierce's esophagus     Cataract     CHF (congestive heart failure) (H)     Chronic rhinitis 10/4/2018    Chronic systolic heart failure (H) 10/4/2018    Clotting disorder     Congenital cardiomyopathy in  (H)     Depression 10/4/2018    Depression     Diastolic  dysfunction     DJD (degenerative joint disease)     neck    DJD (degenerative joint disease) - neck 10/4/2018    H/O congenital atrial septal defect (ASD) repair at age 3 10/4/2018    History of anesthesia complications     nausea    History of transfusion     Hypothyroidism     Hypothyroidism due to medication 10/4/2018    ICD (implantable cardioverter-defibrillator) in place     ICD, [employer] 2008; gen change 2/2018 10/4/2018    Intermittent asthma without complication 10/4/2018    Nonischemic cardiomyopathy (H) 10/4/2018    On amiodarone therapy 10/4/2018    Pacemaker     Paroxysmal atrial fibrillation (H) 10/4/2018    S/P ablation of atrial fibrillation 10/4/2018    Systolic heart failure (H)     Ulcerative colitis (H) 2005    Ulcerative colitis (H)     Ventricular tachycardia (H) 10/4/2018    Ventricular tachycardia (H)        CC Slime Lindsay MD  [address] [place] [phone] on close of this encounter.

## 2025-07-14 LAB
PATH REPORT.COMMENTS IMP SPEC: ABNORMAL
PATH REPORT.COMMENTS IMP SPEC: ABNORMAL
PATH REPORT.COMMENTS IMP SPEC: YES
PATH REPORT.FINAL DX SPEC: ABNORMAL
PATH REPORT.GROSS SPEC: ABNORMAL
PATH REPORT.MICROSCOPIC SPEC OTHER STN: ABNORMAL
PATH REPORT.RELEVANT HX SPEC: ABNORMAL

## 2025-07-16 ENCOUNTER — RESULTS FOLLOW-UP (OUTPATIENT)
Dept: DERMATOLOGY | Facility: CLINIC | Age: 60
End: 2025-07-16
Payer: COMMERCIAL

## 2025-07-16 DIAGNOSIS — C44.319 BASAL CELL CARCINOMA OF RIGHT FOREHEAD: Primary | ICD-10-CM

## 2025-07-16 NOTE — TELEPHONE ENCOUNTER
Patient Contact    Attempt #1    Was call answered? No    Left a voicemail asking patient to give us a call back at our main dermatology line at [phone]    Christine VERONICA RN BSN  [facility] Dermatology  [phone]

## 2025-07-16 NOTE — TELEPHONE ENCOUNTER
Spoke with patient: aware of results  Mohs procedure explained and all questions answered.    Patient would like to be seen at [facility] for treatment. I explained a referral will be sent and someone from their team will reach out for scheduling.     Patient expressed understanding.   Referral placed.     Christine VERONICA RN BSN  [facility] Dermatology  [phone]

## 2025-07-17 ENCOUNTER — TELEPHONE (OUTPATIENT)
Dept: DERMATOLOGY | Facility: CLINIC | Age: 60
End: 2025-07-17
Payer: COMMERCIAL

## 2025-07-17 NOTE — TELEPHONE ENCOUNTER
LVM to sched Consult and Mohs for BCC on forehead. Gave direct number.     Tobias Boyer, Complex

## 2025-07-22 ENCOUNTER — TELEPHONE (OUTPATIENT)
Dept: GASTROENTEROLOGY | Facility: CLINIC | Age: 60
End: 2025-07-22
Payer: COMMERCIAL

## 2025-07-22 NOTE — TELEPHONE ENCOUNTER
"Received call from Vidya in endoscopy scheduling.  Patient previously scheduled for an EGD/Colonoscopy with Dr. Collins on 8/8/25 under moderate/conscious sedation at [facility]. Per Vidya, she was advised to contact patient to reschedule with MAC and PAC eval due to pulmonary hypertension dx.  Patient on the line and very upset about being told that he needs to reschedule with MAC and says he doesn't have pulmonary hypertension and wants to speak to a nurse. Patient really wants to keep the initial procedure appointment due to already having a  set up and doesn't want to delay procedure.  Rescheduling with MAC will push procedure out to much later date.  Writer reviewed chart and per triage review note in 3/17/25 referral order:  \"Sylwia Jay RN 3/22/2025  6:41 AM CDT   Referral Review Recommendations:   Procedure setting: Hospital due to pulmonary hypertension (needs MAC and PAC eval if at [facility])   Sedation: MAC/Deep Sedation pulmonary hypertension (if at [facility] needs PAC eval)   Priority of procedure: Schedule as next available per order.    Heart transplant in 2019.   Pulmonary HTN noted in Cardiac Cath 2/2023\"    Writer spoke with patient and advised him that per review of the 2/23/23 cardiac cath, the report notes: \"Mild elevated pulmonary hypertension\" which triggered the need for rescheduling with MAC d/t the documented pulmonary hypertension.  Writer found no subsequent documentation of pulmonary hypertension in the chart.    Patient does NOT want to reschedule with MAC.     Writer sent message to [facility] anesthesia to see if the patient could possibly have the procedure done under moderate/conscious sedation as previously scheduled, especially due to the pulmonary hypertension being documented as mild.  Patient does have significant cardiac hx including heart transplant so not sure if [facility] anesthesia will approve. Advised patient and  that anesthesia will need to review this and give approval before patient " "can proceed with moderate/conscious sedation. Writer also sent a message to Dr. Collins to be sure she is comfortable with giving moderate/conscious sedation as well. Writer will follow up with Vidya in scheduling after response received.    Addendum in red: Jessica Shipley RN on 7/22/2025 at 1:28 PM  Patient will indeed need MAC per Dr. Collins.  Response received from Dr. Collins: \"from my standpoint his procedures should be done under MAC. His previous EGD's and colonoscopies since 2020 have all been done with MAC and these should have never been scheduled with moderate sedation.   Thank you   NP \"    Writer sent message to Vidya in endoscopy scheduling with the response from Dr. Collins and advised to continue with rescheduling with MAC and PAC eval.      Jessica Shipley RN  Endoscopy Procedure Pre-Assessment RN  [phone], option 3  "

## 2025-07-24 NOTE — TELEPHONE ENCOUNTER
FUTURE VISIT INFORMATION      FUTURE VISIT INFORMATION:  Date: 8/25/25  Time: 3:15  Location: [facility]  REFERRAL INFORMATION:  Referring provider:  Lobo  Referring providers clinic:  [facility]  Reason for visit/diagnosis  Mohs: BCC; Right Forehead      RECORDS REQUESTED FROM:       Clinic name Comments Records Status Imaging Status   [facility] 7/10/25  Path # [id number] Epic Epic

## 2025-08-05 DIAGNOSIS — J45.40 MODERATE PERSISTENT ASTHMA WITHOUT COMPLICATION: ICD-10-CM

## 2025-08-05 RX ORDER — FLUTICASONE PROPIONATE 100 UG/1
1 POWDER, METERED RESPIRATORY (INHALATION) EVERY 12 HOURS
Qty: 60 EACH | Refills: 5 | Status: SHIPPED | OUTPATIENT
Start: 2025-08-05

## 2025-08-11 ENCOUNTER — LAB REQUISITION (OUTPATIENT)
Dept: LAB | Facility: HOSPITAL | Age: 60
End: 2025-08-11
Payer: COMMERCIAL

## 2025-08-11 ENCOUNTER — LAB (OUTPATIENT)
Dept: LAB | Facility: HOSPITAL | Age: 60
End: 2025-08-11
Payer: COMMERCIAL

## 2025-08-11 DIAGNOSIS — I13.10 HYPERTENSIVE HEART AND CHRONIC KIDNEY DISEASE WITHOUT HEART FAILURE, WITH STAGE 1 THROUGH STAGE 4 CHRONIC KIDNEY DISEASE, OR UNSPECIFIED CHRONIC KIDNEY DISEASE: ICD-10-CM

## 2025-08-11 DIAGNOSIS — N25.0 RENAL OSTEODYSTROPHY: ICD-10-CM

## 2025-08-11 DIAGNOSIS — D63.1 ANEMIA IN CHRONIC KIDNEY DISEASE (CODE): ICD-10-CM

## 2025-08-11 DIAGNOSIS — D84.9 IMMUNODEFICIENCY, UNSPECIFIED: ICD-10-CM

## 2025-08-11 DIAGNOSIS — T86.20: ICD-10-CM

## 2025-08-11 DIAGNOSIS — N18.32 CHRONIC KIDNEY DISEASE, STAGE 3B (H): ICD-10-CM

## 2025-08-11 DIAGNOSIS — Z94.1 TRANSPLANTED HEART (H): ICD-10-CM

## 2025-08-11 LAB
ALBUMIN MFR UR ELPH: 166.9 MG/DL
ALBUMIN SERPL BCG-MCNC: 4.1 G/DL (ref 3.5–5.2)
ANION GAP SERPL CALCULATED.3IONS-SCNC: 13 MMOL/L (ref 7–15)
ANION GAP SERPL CALCULATED.3IONS-SCNC: 14 MMOL/L (ref 7–15)
BUN SERPL-MCNC: 38.6 MG/DL (ref 8–23)
BUN SERPL-MCNC: 39.4 MG/DL (ref 8–23)
CALCIUM SERPL-MCNC: 9 MG/DL (ref 8.8–10.4)
CALCIUM SERPL-MCNC: 9 MG/DL (ref 8.8–10.4)
CHLORIDE SERPL-SCNC: 102 MMOL/L (ref 98–107)
CHLORIDE SERPL-SCNC: 102 MMOL/L (ref 98–107)
CREAT SERPL-MCNC: 2.51 MG/DL (ref 0.67–1.17)
CREAT SERPL-MCNC: 2.52 MG/DL (ref 0.67–1.17)
CREAT UR-MCNC: 127.6 MG/DL
EGFRCR SERPLBLD CKD-EPI 2021: 28 ML/MIN/1.73M2
EGFRCR SERPLBLD CKD-EPI 2021: 28 ML/MIN/1.73M2
ERYTHROCYTE [DISTWIDTH] IN BLOOD BY AUTOMATED COUNT: 13.8 % (ref 10–15)
ERYTHROCYTE [DISTWIDTH] IN BLOOD BY AUTOMATED COUNT: 13.9 % (ref 10–15)
GLUCOSE SERPL-MCNC: 89 MG/DL (ref 70–99)
GLUCOSE SERPL-MCNC: 89 MG/DL (ref 70–99)
HCO3 SERPL-SCNC: 23 MMOL/L (ref 22–29)
HCO3 SERPL-SCNC: 23 MMOL/L (ref 22–29)
HCT VFR BLD AUTO: 36.2 % (ref 40–53)
HCT VFR BLD AUTO: 37.1 % (ref 40–53)
HGB BLD-MCNC: 12.1 G/DL (ref 13.3–17.7)
HGB BLD-MCNC: 12.4 G/DL (ref 13.3–17.7)
MAGNESIUM SERPL-MCNC: 2 MG/DL (ref 1.7–2.3)
MCH RBC QN AUTO: 27.4 PG (ref 26.5–33)
MCH RBC QN AUTO: 28.3 PG (ref 26.5–33)
MCHC RBC AUTO-ENTMCNC: 32.6 G/DL (ref 31.5–36.5)
MCHC RBC AUTO-ENTMCNC: 34.3 G/DL (ref 31.5–36.5)
MCV RBC AUTO: 83 FL (ref 78–100)
MCV RBC AUTO: 84 FL (ref 78–100)
PHOSPHATE SERPL-MCNC: 4.4 MG/DL (ref 2.5–4.5)
PLATELET # BLD AUTO: 184 10E3/UL (ref 150–450)
PLATELET # BLD AUTO: 184 10E3/UL (ref 150–450)
POTASSIUM SERPL-SCNC: 4 MMOL/L (ref 3.4–5.3)
POTASSIUM SERPL-SCNC: 4.1 MMOL/L (ref 3.4–5.3)
PROT/CREAT 24H UR: 1.31 MG/MG CR (ref 0–0.2)
PTH-INTACT SERPL-MCNC: 240 PG/ML (ref 15–65)
RBC # BLD AUTO: 4.38 10E6/UL (ref 4.4–5.9)
RBC # BLD AUTO: 4.42 10E6/UL (ref 4.4–5.9)
SIROLIMUS BLD-MCNC: 5.7 UG/L (ref 5–15)
SODIUM SERPL-SCNC: 138 MMOL/L (ref 135–145)
SODIUM SERPL-SCNC: 139 MMOL/L (ref 135–145)
TME LAST DOSE: NORMAL H
TME LAST DOSE: NORMAL H
WBC # BLD AUTO: 4.6 10E3/UL (ref 4–11)
WBC # BLD AUTO: 4.8 10E3/UL (ref 4–11)

## 2025-08-11 PROCEDURE — 80195 ASSAY OF SIROLIMUS: CPT

## 2025-08-11 PROCEDURE — 85048 AUTOMATED LEUKOCYTE COUNT: CPT

## 2025-08-11 PROCEDURE — 36415 COLL VENOUS BLD VENIPUNCTURE: CPT

## 2025-08-11 PROCEDURE — 82947 ASSAY GLUCOSE BLOOD QUANT: CPT

## 2025-08-11 PROCEDURE — 85027 COMPLETE CBC AUTOMATED: CPT | Mod: ORL | Performed by: INTERNAL MEDICINE

## 2025-08-11 PROCEDURE — 83735 ASSAY OF MAGNESIUM: CPT | Mod: ORL | Performed by: INTERNAL MEDICINE

## 2025-08-11 PROCEDURE — 83970 ASSAY OF PARATHORMONE: CPT | Mod: ORL | Performed by: INTERNAL MEDICINE

## 2025-08-11 PROCEDURE — 84156 ASSAY OF PROTEIN URINE: CPT | Mod: ORL | Performed by: INTERNAL MEDICINE

## 2025-08-25 ENCOUNTER — PRE VISIT (OUTPATIENT)
Dept: DERMATOLOGY | Facility: CLINIC | Age: 60
End: 2025-08-25

## 2025-08-25 ENCOUNTER — OFFICE VISIT (OUTPATIENT)
Dept: DERMATOLOGY | Facility: CLINIC | Age: 60
End: 2025-08-25
Payer: COMMERCIAL

## 2025-08-25 DIAGNOSIS — C44.319 BASAL CELL CARCINOMA OF RIGHT FOREHEAD: Primary | ICD-10-CM

## 2025-08-25 PROCEDURE — 99213 OFFICE O/P EST LOW 20 MIN: CPT | Mod: GC | Performed by: DERMATOLOGY

## 2025-08-27 ENCOUNTER — TELEPHONE (OUTPATIENT)
Dept: DERMATOLOGY | Facility: CLINIC | Age: 60
End: 2025-08-27

## 2025-08-27 ENCOUNTER — OFFICE VISIT (OUTPATIENT)
Dept: DERMATOLOGY | Facility: CLINIC | Age: 60
End: 2025-08-27
Attending: NURSE PRACTITIONER
Payer: COMMERCIAL

## 2025-08-27 VITALS — DIASTOLIC BLOOD PRESSURE: 95 MMHG | HEART RATE: 108 BPM | SYSTOLIC BLOOD PRESSURE: 151 MMHG

## 2025-08-27 DIAGNOSIS — C44.319 BASAL CELL CARCINOMA OF RIGHT FOREHEAD: ICD-10-CM

## 2025-08-27 PROCEDURE — 13132 CMPLX RPR F/C/C/M/N/AX/G/H/F: CPT | Mod: GC | Performed by: DERMATOLOGY

## 2025-08-27 PROCEDURE — 17311 MOHS 1 STAGE H/N/HF/G: CPT | Mod: GC | Performed by: DERMATOLOGY

## 2025-08-27 PROCEDURE — 17312 MOHS ADDL STAGE: CPT | Mod: GC | Performed by: DERMATOLOGY

## 2025-09-01 ENCOUNTER — ANESTHESIA EVENT (OUTPATIENT)
Dept: GASTROENTEROLOGY | Facility: CLINIC | Age: 60
End: 2025-09-01
Payer: COMMERCIAL

## 2025-09-01 ASSESSMENT — ENCOUNTER SYMPTOMS: DYSRHYTHMIAS: 1

## 2025-09-02 ENCOUNTER — HOSPITAL ENCOUNTER (OUTPATIENT)
Facility: CLINIC | Age: 60
Discharge: HOME | End: 2025-09-02
Attending: INTERNAL MEDICINE | Admitting: INTERNAL MEDICINE
Payer: COMMERCIAL

## 2025-09-02 ENCOUNTER — ANESTHESIA (OUTPATIENT)
Dept: GASTROENTEROLOGY | Facility: CLINIC | Age: 60
End: 2025-09-02
Payer: COMMERCIAL

## 2025-09-02 VITALS
RESPIRATION RATE: 18 BRPM | HEART RATE: 94 BPM | OXYGEN SATURATION: 100 % | TEMPERATURE: 98.2 F | SYSTOLIC BLOOD PRESSURE: 139 MMHG | DIASTOLIC BLOOD PRESSURE: 94 MMHG

## 2025-09-02 DIAGNOSIS — K22.719 BARRETT'S ESOPHAGUS WITH DYSPLASIA: Primary | Chronic | ICD-10-CM

## 2025-09-02 LAB
COLONOSCOPY: NORMAL
UPPER GI ENDOSCOPY: NORMAL

## 2025-09-02 PROCEDURE — 43239 EGD BIOPSY SINGLE/MULTIPLE: CPT | Performed by: INTERNAL MEDICINE

## 2025-09-02 PROCEDURE — 45380 COLONOSCOPY AND BIOPSY: CPT | Performed by: INTERNAL MEDICINE

## 2025-09-02 PROCEDURE — 88305 TISSUE EXAM BY PATHOLOGIST: CPT | Mod: TC | Performed by: INTERNAL MEDICINE

## 2025-09-02 PROCEDURE — 250N000011 HC RX IP 250 OP 636: Performed by: NURSE ANESTHETIST, CERTIFIED REGISTERED

## 2025-09-02 PROCEDURE — 250N000009 HC RX 250: Performed by: NURSE ANESTHETIST, CERTIFIED REGISTERED

## 2025-09-02 PROCEDURE — 258N000003 HC RX IP 258 OP 636: Performed by: NURSE ANESTHETIST, CERTIFIED REGISTERED

## 2025-09-02 PROCEDURE — 370N000017 HC ANESTHESIA TECHNICAL FEE, PER MIN: Performed by: INTERNAL MEDICINE

## 2025-09-02 RX ORDER — ONDANSETRON 4 MG/1
4 TABLET, ORALLY DISINTEGRATING ORAL EVERY 6 HOURS PRN
Status: DISCONTINUED | OUTPATIENT
Start: 2025-09-02 | End: 2025-09-02 | Stop reason: HOSPADM

## 2025-09-02 RX ORDER — ONDANSETRON 4 MG/1
4 TABLET, ORALLY DISINTEGRATING ORAL EVERY 30 MIN PRN
Status: DISCONTINUED | OUTPATIENT
Start: 2025-09-02 | End: 2025-09-02 | Stop reason: HOSPADM

## 2025-09-02 RX ORDER — NALOXONE HYDROCHLORIDE 0.4 MG/ML
0.4 INJECTION, SOLUTION INTRAMUSCULAR; INTRAVENOUS; SUBCUTANEOUS
Status: DISCONTINUED | OUTPATIENT
Start: 2025-09-02 | End: 2025-09-02 | Stop reason: HOSPADM

## 2025-09-02 RX ORDER — NALOXONE HYDROCHLORIDE 0.4 MG/ML
0.1 INJECTION, SOLUTION INTRAMUSCULAR; INTRAVENOUS; SUBCUTANEOUS
Status: DISCONTINUED | OUTPATIENT
Start: 2025-09-02 | End: 2025-09-02 | Stop reason: HOSPADM

## 2025-09-02 RX ORDER — PROPOFOL 10 MG/ML
INJECTION, EMULSION INTRAVENOUS CONTINUOUS PRN
Status: DISCONTINUED | OUTPATIENT
Start: 2025-09-02 | End: 2025-09-02

## 2025-09-02 RX ORDER — NALOXONE HYDROCHLORIDE 0.4 MG/ML
0.2 INJECTION, SOLUTION INTRAMUSCULAR; INTRAVENOUS; SUBCUTANEOUS
Status: DISCONTINUED | OUTPATIENT
Start: 2025-09-02 | End: 2025-09-02 | Stop reason: HOSPADM

## 2025-09-02 RX ORDER — LIDOCAINE HYDROCHLORIDE 20 MG/ML
INJECTION, SOLUTION INFILTRATION; PERINEURAL PRN
Status: DISCONTINUED | OUTPATIENT
Start: 2025-09-02 | End: 2025-09-02

## 2025-09-02 RX ORDER — PROPOFOL 10 MG/ML
INJECTION, EMULSION INTRAVENOUS PRN
Status: DISCONTINUED | OUTPATIENT
Start: 2025-09-02 | End: 2025-09-02

## 2025-09-02 RX ORDER — ONDANSETRON 2 MG/ML
4 INJECTION INTRAMUSCULAR; INTRAVENOUS EVERY 30 MIN PRN
Status: DISCONTINUED | OUTPATIENT
Start: 2025-09-02 | End: 2025-09-02 | Stop reason: HOSPADM

## 2025-09-02 RX ORDER — FLUMAZENIL 0.1 MG/ML
0.2 INJECTION, SOLUTION INTRAVENOUS
Status: DISCONTINUED | OUTPATIENT
Start: 2025-09-02 | End: 2025-09-02 | Stop reason: HOSPADM

## 2025-09-02 RX ORDER — OXYCODONE HYDROCHLORIDE 10 MG/1
10 TABLET ORAL
Status: DISCONTINUED | OUTPATIENT
Start: 2025-09-02 | End: 2025-09-02 | Stop reason: HOSPADM

## 2025-09-02 RX ORDER — ONDANSETRON 2 MG/ML
4 INJECTION INTRAMUSCULAR; INTRAVENOUS EVERY 6 HOURS PRN
Status: DISCONTINUED | OUTPATIENT
Start: 2025-09-02 | End: 2025-09-02 | Stop reason: HOSPADM

## 2025-09-02 RX ORDER — OXYCODONE HYDROCHLORIDE 5 MG/1
5 TABLET ORAL
Status: DISCONTINUED | OUTPATIENT
Start: 2025-09-02 | End: 2025-09-02 | Stop reason: HOSPADM

## 2025-09-02 RX ORDER — FENTANYL CITRATE 50 UG/ML
50 INJECTION, SOLUTION INTRAMUSCULAR; INTRAVENOUS EVERY 5 MIN PRN
Status: DISCONTINUED | OUTPATIENT
Start: 2025-09-02 | End: 2025-09-02 | Stop reason: HOSPADM

## 2025-09-02 RX ORDER — DEXAMETHASONE SODIUM PHOSPHATE 4 MG/ML
4 INJECTION, SOLUTION INTRA-ARTICULAR; INTRALESIONAL; INTRAMUSCULAR; INTRAVENOUS; SOFT TISSUE
Status: DISCONTINUED | OUTPATIENT
Start: 2025-09-02 | End: 2025-09-02 | Stop reason: HOSPADM

## 2025-09-02 RX ORDER — ONDANSETRON 2 MG/ML
4 INJECTION INTRAMUSCULAR; INTRAVENOUS
Status: DISCONTINUED | OUTPATIENT
Start: 2025-09-02 | End: 2025-09-02 | Stop reason: HOSPADM

## 2025-09-02 RX ORDER — PROCHLORPERAZINE MALEATE 5 MG/1
10 TABLET ORAL EVERY 6 HOURS PRN
Status: DISCONTINUED | OUTPATIENT
Start: 2025-09-02 | End: 2025-09-02 | Stop reason: HOSPADM

## 2025-09-02 RX ORDER — LIDOCAINE 40 MG/G
CREAM TOPICAL
Status: DISCONTINUED | OUTPATIENT
Start: 2025-09-02 | End: 2025-09-02 | Stop reason: HOSPADM

## 2025-09-02 RX ORDER — SODIUM CHLORIDE, SODIUM LACTATE, POTASSIUM CHLORIDE, CALCIUM CHLORIDE 600; 310; 30; 20 MG/100ML; MG/100ML; MG/100ML; MG/100ML
INJECTION, SOLUTION INTRAVENOUS CONTINUOUS
Status: DISCONTINUED | OUTPATIENT
Start: 2025-09-02 | End: 2025-09-02 | Stop reason: HOSPADM

## 2025-09-02 RX ORDER — SODIUM CHLORIDE, SODIUM LACTATE, POTASSIUM CHLORIDE, CALCIUM CHLORIDE 600; 310; 30; 20 MG/100ML; MG/100ML; MG/100ML; MG/100ML
INJECTION, SOLUTION INTRAVENOUS CONTINUOUS PRN
Status: DISCONTINUED | OUTPATIENT
Start: 2025-09-02 | End: 2025-09-02

## 2025-09-02 RX ORDER — FENTANYL CITRATE 50 UG/ML
25 INJECTION, SOLUTION INTRAMUSCULAR; INTRAVENOUS EVERY 5 MIN PRN
Status: DISCONTINUED | OUTPATIENT
Start: 2025-09-02 | End: 2025-09-02 | Stop reason: HOSPADM

## 2025-09-02 RX ADMIN — LIDOCAINE HYDROCHLORIDE 100 MG: 20 INJECTION, SOLUTION INFILTRATION; PERINEURAL at 08:27

## 2025-09-02 RX ADMIN — TOPICAL ANESTHETIC 1 SPRAY: 200 SPRAY DENTAL; PERIODONTAL at 08:26

## 2025-09-02 RX ADMIN — PROPOFOL 175 MCG/KG/MIN: 10 INJECTION, EMULSION INTRAVENOUS at 08:27

## 2025-09-02 RX ADMIN — PROPOFOL 30 MG: 10 INJECTION, EMULSION INTRAVENOUS at 08:29

## 2025-09-02 RX ADMIN — SODIUM CHLORIDE, SODIUM LACTATE, POTASSIUM CHLORIDE, AND CALCIUM CHLORIDE: .6; .31; .03; .02 INJECTION, SOLUTION INTRAVENOUS at 08:23

## 2025-09-02 RX ADMIN — PROPOFOL 70 MG: 10 INJECTION, EMULSION INTRAVENOUS at 08:27

## 2025-09-02 ASSESSMENT — ACTIVITIES OF DAILY LIVING (ADL)
ADLS_ACUITY_SCORE: 54

## 2107-09-18 ENCOUNTER — RECORDS - HEALTHEAST (OUTPATIENT)
Dept: ADMINISTRATIVE | Facility: OTHER | Age: OVER 89
End: 2107-09-18

## (undated) DEVICE — INTRO SHEATH AVANTI 4FRX23CM 504604T

## (undated) DEVICE — INTRO SHEATH 7FRX10CM PINNACLE RSS702

## (undated) DEVICE — SU ETHIBOND 0 TIE 6X30" X306H

## (undated) DEVICE — SU PROLENE 3-0 SHDA 36" 8522H

## (undated) DEVICE — PACK HEART RIGHT CUSTOM SAN32RHF18

## (undated) DEVICE — GUIDEWIRE L80CM OD.035IN 3 CM J-TIP V

## (undated) DEVICE — DRSG ABDOMINAL 07 1/2X8" 7197D

## (undated) DEVICE — MANIFOLD KIT ANGIO AUTOMATED 014613

## (undated) DEVICE — SU VICRYL 2-0 CT-1 27" UND J259H

## (undated) DEVICE — FORCEP ENDOMYOCARDIAL BIOPSY STRAIGHT 6FRX50CM 190060

## (undated) DEVICE — CLIP HORIZON SM RED WIDE SLOT 001201

## (undated) DEVICE — CATH GUIDING BLUE YELLOW PTFE XB3.5 6FRX100CM 67005400

## (undated) DEVICE — BONE WAX 2.5GM W31G

## (undated) DEVICE — GW VASC .035IN DIA 260CML 7CML 3 MM RADIUS J CURVE 502455

## (undated) DEVICE — SU VICRYL 3-0 SH 27" J316H

## (undated) DEVICE — Device

## (undated) DEVICE — ESU HOLSTER PLASTIC DISP E2400

## (undated) DEVICE — DRAPE IOBAN INCISE 23X17" 6650EZ

## (undated) DEVICE — KIT RIGHT HEART CATH H965601307191

## (undated) DEVICE — SU VICRYL 0 CTX 36" J370H

## (undated) DEVICE — TUBING PRESSURE 30"

## (undated) DEVICE — SPONGE PEANUT

## (undated) DEVICE — CONNECTOR BLAKE DRAIN SGL BCC1

## (undated) DEVICE — WIRE GUIDE HI-TRQ BALANCE MDWT JTIP 0.014"X190CM 1001780J-HC

## (undated) DEVICE — SU PROLENE 4-0 RB-1DA 36" 8557H

## (undated) DEVICE — INTRODUCER SHEATH FAST-CATH 7FRX40CM GSPC CVD 406772

## (undated) DEVICE — WIRE GUIDE 0.035"X150CM EMERALD J TIP 502521

## (undated) DEVICE — PACK HEART LEFT CUSTOM

## (undated) DEVICE — FORCEP ENDMYCRD BX DISP STR 6F

## (undated) DEVICE — BLADE CLIPPER SGL USE 9680

## (undated) DEVICE — SLEEVE TR BAND RADIAL COMPRESSION DEVICE 24CM TRB24-REG

## (undated) DEVICE — FASTENER CATH BALLOON CLAMPX2 STATLOCK 0684-00-493

## (undated) DEVICE — INTRO SHEATH 7FRX25CM PINNACLE RSS706

## (undated) DEVICE — KIT HAND CONTROL ACIST 014644 AR-P54

## (undated) DEVICE — SPONGE RAY-TEC 4X8" 7318

## (undated) DEVICE — SHEATH INTRO OD 7FR L98CM ANGLED

## (undated) DEVICE — DRSG DRAIN 4X4" 7086

## (undated) DEVICE — SHTH INTRO 0.021IN ID 6FR DIA

## (undated) DEVICE — DRSG PRIMAPORE 03 1/8X6" 66000318

## (undated) DEVICE — VESSEL LOOPS YELLOW MAXI 31145694

## (undated) DEVICE — DRAIN CHEST TUBE 32FR STR 8032

## (undated) DEVICE — WIRE GUIDE 0.035"X150CM EMERALD STR 502542

## (undated) DEVICE — INTRO SHEATH 5FRX10CM PINNACLE RSS502

## (undated) DEVICE — TAPE MEDIPORE 4"X2YD 2864

## (undated) DEVICE — SU PROLENE 5-0 RB-2DA 30" 8710H

## (undated) DEVICE — PREP CHLORAPREP 26ML TINTED ORANGE  260815

## (undated) DEVICE — KIT MANIFORD ACIST B200

## (undated) DEVICE — DRAIN JACKSON PRATT RESERVOIR 100ML SU130-1305

## (undated) DEVICE — CATH ANGIO INFINITI JL4 4FRX100CM 538420

## (undated) DEVICE — SU VICRYL 3-0 FS-1 27" J442H

## (undated) DEVICE — SU DERMABOND ADVANCED .7ML DNX12

## (undated) DEVICE — LINEN TOWEL PACK X30 5481

## (undated) DEVICE — INTRO SHEATH MICRO PLATINUM TIP 4FRX40CM 7274

## (undated) DEVICE — CLIP HORIZON MED BLUE 002200

## (undated) DEVICE — DRSG TEGADERM 8X12" 1629

## (undated) DEVICE — SOL NACL 0.9% 10ML VIAL 0409-4888-02

## (undated) DEVICE — SU PROLENE 4-0 SHDA 36" 8521H

## (undated) DEVICE — WIRE GUIDE 0.025"X150CM EMERALD J TIP 502524

## (undated) DEVICE — PITCHER STERILE 1000ML  SSK9004A

## (undated) DEVICE — CATH ANGIO SUPERTORQUE PLUS JL4 6FRX100CM 533620

## (undated) DEVICE — TUBING INSUFFLATION W/FILTER CPC TO LUER 620-030-301

## (undated) DEVICE — LEAD ELEC MYOCARDIO PACING TEMPORARY MEDTRONIC

## (undated) DEVICE — WIRE GUIDE 0.025"X260CM PLATINUM PLUS SHORT TAPER M001466110

## (undated) DEVICE — SPONGE KITTNER 30-101

## (undated) DEVICE — GLOVE SENSICARE 7.5 MSG1075 LATEX FREE

## (undated) DEVICE — CATH ANGIO INFINITI 3DRC 6FRX100CM 534676T

## (undated) DEVICE — SU ETHIBOND 2-0 SHDA 30" X563H

## (undated) DEVICE — CLIP HORIZON LG ORANGE 004200

## (undated) DEVICE — WIPES FOLEY CARE SURESTEP PROVON DFC100

## (undated) DEVICE — CATH ANGIO INFINITI 3DRC 4FRX100CM 538476

## (undated) DEVICE — CATH BALLOON IABP 7.5FRX50ML INTRA-AORTIC 0684-00-0576-01U

## (undated) DEVICE — SU STEEL 6 CCS 4X18" M654G

## (undated) DEVICE — TOURNIQUET VASCULAR KIT ARGYLE 8888-585000

## (undated) DEVICE — CATH US OD 5FR OD SEC 2.9FR EAGLE EYE PLATINUM 0.014 85900P

## (undated) DEVICE — CATH GUIDING IKARI 6FR IL3.5 LEFT HEARTRAIL 40-6370

## (undated) DEVICE — CATH ANGIO SUPERTORQUE PLUS JR4 6FRX100CM 533621

## (undated) DEVICE — 0.035IN X 260CM, EMERALD DIAGNOSTIC GUIDEWIRE, FIXED-CORE PTFE COATED, STANDARD, 3MM EXCHANGE J-TIP (EA/1)

## (undated) DEVICE — TIES BANDING T50R

## (undated) DEVICE — CATH TRAY FOLEY SURESTEP 16FR W/TMP PRB STLK LATEX A319416AM

## (undated) DEVICE — SU PROLENE 5-0 RB-2DA 18" 8713H

## (undated) DEVICE — SUCTION CATH AIRLIFE TRI-FLO W/CONTROL PORT 14FR  T60C

## (undated) DEVICE — DRAPE SHEET REV FOLD 3/4 9349

## (undated) DEVICE — SOL NACL 0.9% IRRIG 1000ML BOTTLE 2F7124

## (undated) DEVICE — KIT RIGHT HEART CATH 60130719

## (undated) DEVICE — WIRE GUIDE 0.035"X260CM SAFE-T-J EXCHANGE G00517

## (undated) DEVICE — 7 FR X 45CM PINNACLE DESTINATION GUIDING SHEATH, HOCKEY STICK CURVE STYLE W/ CROSS-CUT VALVE T DILATOR

## (undated) DEVICE — SU VICRYL 0 CT-1 36" J346H

## (undated) DEVICE — SUCTION MANIFOLD DORNOCH ULTRA CART UL-CL500

## (undated) DEVICE — SUTURE BOOTS 051003PBX

## (undated) DEVICE — SU PROLENE 3-0 SHDA 48" 8534H

## (undated) DEVICE — SU PLEDGET SOFT TFE 13MMX7MMX1.5MM D7044

## (undated) DEVICE — DRSG BIOPATCH GERMICIDAL SPLIT SPONGE 4MM MED 4150

## (undated) DEVICE — LINEN GOWN XLG 5407

## (undated) DEVICE — TUBING PRESSURE TRANSDUCER MALE TO FEMALE LL 72" 50P172

## (undated) DEVICE — PROTECTOR ARM ONE-STEP TRENDELENBURG 40418

## (undated) DEVICE — BASIN SET SINGLE STERILE 13752-624

## (undated) DEVICE — INTRO CATH 40CM 7FR FST-CATH

## (undated) DEVICE — LINEN TOWEL PACK X6 WHITE 5487

## (undated) DEVICE — KIT HAND CONTROL ACIST 016795

## (undated) DEVICE — DRAPE TIBURON CARDIOVASCULAR PERI-GROIN LF 9154

## (undated) DEVICE — INTRODUCER SHEATH FAST-CATH 7FRX12CM 406244

## (undated) DEVICE — ESU ELEC BLADE 6" COATED E1450-6

## (undated) DEVICE — .096IN ROTATING HEMOSTATIC VALVE

## (undated) DEVICE — SU ETHIBOND 0 CT-1 CR 8X18" CX21D

## (undated) DEVICE — INTRO SHEATH 9FRX10CM PINNACLE RSS902

## (undated) DEVICE — DRAIN CHEST TUBE RIGHT ANGLED 28FR 8128

## (undated) DEVICE — SU ETHIBOND 3-0 BBDA 36" X588H

## (undated) DEVICE — DRAPE SLUSH/WARMER 66X44" ORS-320

## (undated) DEVICE — SYR 30ML LL W/O NDL 302832

## (undated) DEVICE — ESU BIPOLAR SEALER AQUAMANTYS 6MM 23-112-1

## (undated) DEVICE — PACK ADULT HEART UMMC PV15CG92D

## (undated) DEVICE — SYR 20ML LL W/O NDL 302830

## (undated) DEVICE — SU STEEL MYO/WIRE II STERNOTOMY 8 BE-1 3X14" 048-217

## (undated) DEVICE — ESU ELEC BLADE 2.75" COATED/INSULATED E1455

## (undated) DEVICE — ESU GROUND PAD ADULT W/CORD E7507

## (undated) DEVICE — SLEEVE REPOSITIONING W/CATH LOCK 60CM 406503

## (undated) DEVICE — VALVE HEMOSTASIS .096" COPILOT MECH 1003331

## (undated) DEVICE — INTRO SHEATH 6FRX25CM PINNACLE RSS606

## (undated) DEVICE — DRAPE C-ARM W/STRAPS 42X72" 07-CA104

## (undated) DEVICE — TOURNIQUET VASCULAR KIT 7 1/2" 79012

## (undated) DEVICE — GLOVE PROTEXIS POWDER FREE 7.0 ORTHOPEDIC 2D73ET70

## (undated) DEVICE — DRSG KERLIX 4 1/2"X4YDS ROLL 6715

## (undated) DEVICE — SYR 10ML SLIP TIP W/O NDL 303134

## (undated) DEVICE — SYR 10ML LL W/O NDL 302995

## (undated) DEVICE — CATH ANGIO INFINITI JR4 4FRX100CM 538421

## (undated) DEVICE — SUCTION DRY CHEST DRAIN OASIS 3600-100

## (undated) DEVICE — PREP CHLORHEXIDINE 4% 4OZ (HIBICLENS) 57504

## (undated) DEVICE — INTRODUCER SHEATH FAST-CATH 7FRX85CM GSPC CVD 406772

## (undated) DEVICE — NDL ANGIOCATH 14GA 1.25" 4048

## (undated) DEVICE — DEFIB PRO-PADZ LVP LQD GEL ADULT 8900-2105-01

## (undated) DEVICE — BLADE SAW SAGITTAL STERNOTOMY CV SM LINVATEC 5023-187

## (undated) DEVICE — SU PLEDGET SOFT TFE 3/8"X3/26"X1/16" PCP40

## (undated) DEVICE — DRSG TEGADERM 4X4 3/4" 1626W

## (undated) DEVICE — CONNECTOR DRAIN CHEST Y EXTENSION SET 19909

## (undated) RX ORDER — FENTANYL CITRATE 50 UG/ML
INJECTION, SOLUTION INTRAMUSCULAR; INTRAVENOUS
Status: DISPENSED
Start: 2019-02-24

## (undated) RX ORDER — ALBUTEROL SULFATE 0.83 MG/ML
SOLUTION RESPIRATORY (INHALATION)
Status: DISPENSED
Start: 2019-04-30

## (undated) RX ORDER — FENTANYL CITRATE 50 UG/ML
INJECTION, SOLUTION INTRAMUSCULAR; INTRAVENOUS
Status: DISPENSED
Start: 2021-03-17

## (undated) RX ORDER — FENTANYL CITRATE 50 UG/ML
INJECTION, SOLUTION INTRAMUSCULAR; INTRAVENOUS
Status: DISPENSED
Start: 2019-03-19

## (undated) RX ORDER — HEPARIN SODIUM 1000 [USP'U]/ML
INJECTION, SOLUTION INTRAVENOUS; SUBCUTANEOUS
Status: DISPENSED
Start: 2021-03-17

## (undated) RX ORDER — LIDOCAINE HYDROCHLORIDE 10 MG/ML
INJECTION, SOLUTION EPIDURAL; INFILTRATION; INTRACAUDAL; PERINEURAL
Status: DISPENSED
Start: 2019-03-19

## (undated) RX ORDER — AMINOPHYLLINE 25 MG/ML
INJECTION, SOLUTION INTRAVENOUS
Status: DISPENSED
Start: 2022-02-23

## (undated) RX ORDER — HEPARIN SODIUM 1000 [USP'U]/ML
INJECTION, SOLUTION INTRAVENOUS; SUBCUTANEOUS
Status: DISPENSED
Start: 2019-02-19

## (undated) RX ORDER — LIDOCAINE HYDROCHLORIDE 20 MG/ML
INJECTION, SOLUTION EPIDURAL; INFILTRATION; INTRACAUDAL; PERINEURAL
Status: DISPENSED
Start: 2019-02-24

## (undated) RX ORDER — LIDOCAINE HYDROCHLORIDE 10 MG/ML
INJECTION, SOLUTION INFILTRATION; PERINEURAL
Status: DISPENSED
Start: 2018-11-19

## (undated) RX ORDER — LIDOCAINE HYDROCHLORIDE 40 MG/ML
SOLUTION TOPICAL
Status: DISPENSED
Start: 2019-04-30

## (undated) RX ORDER — PHENYLEPHRINE HCL IN 0.9% NACL 1 MG/10 ML
SYRINGE (ML) INTRAVENOUS
Status: DISPENSED
Start: 2020-02-20

## (undated) RX ORDER — CEFAZOLIN SODIUM 1 G/3ML
INJECTION, POWDER, FOR SOLUTION INTRAMUSCULAR; INTRAVENOUS
Status: DISPENSED
Start: 2019-02-21

## (undated) RX ORDER — NITROGLYCERIN 5 MG/ML
VIAL (ML) INTRAVENOUS
Status: DISPENSED
Start: 2020-02-12

## (undated) RX ORDER — HEPARIN SODIUM 1000 [USP'U]/ML
INJECTION, SOLUTION INTRAVENOUS; SUBCUTANEOUS
Status: DISPENSED
Start: 2022-02-23

## (undated) RX ORDER — PHENYLEPHRINE HCL IN 0.9% NACL 1 MG/10 ML
SYRINGE (ML) INTRAVENOUS
Status: DISPENSED
Start: 2019-03-21

## (undated) RX ORDER — HEPARIN SODIUM 1000 [USP'U]/ML
INJECTION, SOLUTION INTRAVENOUS; SUBCUTANEOUS
Status: DISPENSED
Start: 2019-02-24

## (undated) RX ORDER — LIDOCAINE HYDROCHLORIDE 20 MG/ML
INJECTION, SOLUTION EPIDURAL; INFILTRATION; INTRACAUDAL; PERINEURAL
Status: DISPENSED
Start: 2019-03-21

## (undated) RX ORDER — ETOMIDATE 2 MG/ML
INJECTION INTRAVENOUS
Status: DISPENSED
Start: 2019-02-19

## (undated) RX ORDER — LIDOCAINE 40 MG/G
CREAM TOPICAL
Status: DISPENSED
Start: 2018-11-02

## (undated) RX ORDER — FENTANYL CITRATE 50 UG/ML
INJECTION, SOLUTION INTRAMUSCULAR; INTRAVENOUS
Status: DISPENSED
Start: 2023-02-23

## (undated) RX ORDER — ALBUTEROL SULFATE 0.83 MG/ML
SOLUTION RESPIRATORY (INHALATION)
Status: DISPENSED
Start: 2019-06-18

## (undated) RX ORDER — LIDOCAINE HYDROCHLORIDE 10 MG/ML
INJECTION, SOLUTION EPIDURAL; INFILTRATION; INTRACAUDAL; PERINEURAL
Status: DISPENSED
Start: 2019-03-09

## (undated) RX ORDER — GLYCOPYRROLATE 0.2 MG/ML
INJECTION, SOLUTION INTRAMUSCULAR; INTRAVENOUS
Status: DISPENSED
Start: 2019-02-24

## (undated) RX ORDER — NICARDIPINE HCL-0.9% SOD CHLOR 1 MG/10 ML
SYRINGE (ML) INTRAVENOUS
Status: DISPENSED
Start: 2023-02-23

## (undated) RX ORDER — SODIUM CHLORIDE 9 MG/ML
INJECTION, SOLUTION INTRAVENOUS
Status: DISPENSED
Start: 2023-02-23

## (undated) RX ORDER — CEFAZOLIN SODIUM 1 G/3ML
INJECTION, POWDER, FOR SOLUTION INTRAMUSCULAR; INTRAVENOUS
Status: DISPENSED
Start: 2019-02-19

## (undated) RX ORDER — ASPIRIN 325 MG
TABLET ORAL
Status: DISPENSED
Start: 2022-02-23

## (undated) RX ORDER — DIPHENHYDRAMINE HYDROCHLORIDE 50 MG/ML
INJECTION INTRAMUSCULAR; INTRAVENOUS
Status: DISPENSED
Start: 2023-02-23

## (undated) RX ORDER — AMINOPHYLLINE 25 MG/ML
INJECTION, SOLUTION INTRAVENOUS
Status: DISPENSED
Start: 2024-04-18

## (undated) RX ORDER — ONDANSETRON 2 MG/ML
INJECTION INTRAMUSCULAR; INTRAVENOUS
Status: DISPENSED
Start: 2019-02-19

## (undated) RX ORDER — PROPOFOL 10 MG/ML
INJECTION, EMULSION INTRAVENOUS
Status: DISPENSED
Start: 2020-02-20

## (undated) RX ORDER — LIDOCAINE 40 MG/G
CREAM TOPICAL
Status: DISPENSED
Start: 2019-06-04

## (undated) RX ORDER — REGADENOSON 0.08 MG/ML
INJECTION, SOLUTION INTRAVENOUS
Status: DISPENSED
Start: 2021-03-17

## (undated) RX ORDER — FENTANYL CITRATE-0.9 % NACL/PF 10 MCG/ML
PLASTIC BAG, INJECTION (ML) INTRAVENOUS
Status: DISPENSED
Start: 2025-09-02

## (undated) RX ORDER — FENTANYL CITRATE 50 UG/ML
INJECTION, SOLUTION INTRAMUSCULAR; INTRAVENOUS
Status: DISPENSED
Start: 2022-02-23

## (undated) RX ORDER — REGADENOSON 0.08 MG/ML
INJECTION, SOLUTION INTRAVENOUS
Status: DISPENSED
Start: 2025-02-19

## (undated) RX ORDER — SODIUM CHLORIDE 9 MG/ML
INJECTION, SOLUTION INTRAVENOUS
Status: DISPENSED
Start: 2019-10-15

## (undated) RX ORDER — FENTANYL CITRATE 50 UG/ML
INJECTION, SOLUTION INTRAMUSCULAR; INTRAVENOUS
Status: DISPENSED
Start: 2019-02-21

## (undated) RX ORDER — LIDOCAINE HYDROCHLORIDE 20 MG/ML
INJECTION, SOLUTION EPIDURAL; INFILTRATION; INTRACAUDAL; PERINEURAL
Status: DISPENSED
Start: 2020-02-20

## (undated) RX ORDER — LIDOCAINE HYDROCHLORIDE 20 MG/ML
INJECTION, SOLUTION EPIDURAL; INFILTRATION; INTRACAUDAL; PERINEURAL
Status: DISPENSED
Start: 2019-02-19

## (undated) RX ORDER — LIDOCAINE 40 MG/G
CREAM TOPICAL
Status: DISPENSED
Start: 2019-10-15

## (undated) RX ORDER — POTASSIUM CHLORIDE 750 MG/1
TABLET, EXTENDED RELEASE ORAL
Status: DISPENSED
Start: 2022-02-23

## (undated) RX ORDER — ASPIRIN 81 MG/1
TABLET, CHEWABLE ORAL
Status: DISPENSED
Start: 2022-02-23

## (undated) RX ORDER — PROPOFOL 10 MG/ML
INJECTION, EMULSION INTRAVENOUS
Status: DISPENSED
Start: 2019-03-21

## (undated) RX ORDER — PROTAMINE SULFATE 10 MG/ML
INJECTION, SOLUTION INTRAVENOUS
Status: DISPENSED
Start: 2019-02-19

## (undated) RX ORDER — PROPOFOL 10 MG/ML
INJECTION, EMULSION INTRAVENOUS
Status: DISPENSED
Start: 2019-02-24

## (undated) RX ORDER — ETOMIDATE 2 MG/ML
INJECTION INTRAVENOUS
Status: DISPENSED
Start: 2019-02-21

## (undated) RX ORDER — LIDOCAINE HYDROCHLORIDE 10 MG/ML
INJECTION, SOLUTION EPIDURAL; INFILTRATION; INTRACAUDAL; PERINEURAL
Status: DISPENSED
Start: 2019-05-08

## (undated) RX ORDER — LIDOCAINE 40 MG/G
CREAM TOPICAL
Status: DISPENSED
Start: 2019-04-24

## (undated) RX ORDER — LIDOCAINE 40 MG/G
CREAM TOPICAL
Status: DISPENSED
Start: 2022-02-23

## (undated) RX ORDER — CEFAZOLIN SODIUM 1 G/3ML
INJECTION, POWDER, FOR SOLUTION INTRAMUSCULAR; INTRAVENOUS
Status: DISPENSED
Start: 2019-02-24

## (undated) RX ORDER — AMINOPHYLLINE 25 MG/ML
INJECTION, SOLUTION INTRAVENOUS
Status: DISPENSED
Start: 2021-03-17

## (undated) RX ORDER — PROPOFOL 10 MG/ML
INJECTION, EMULSION INTRAVENOUS
Status: DISPENSED
Start: 2025-09-02

## (undated) RX ORDER — LIDOCAINE HYDROCHLORIDE 10 MG/ML
INJECTION, SOLUTION INFILTRATION; PERINEURAL
Status: DISPENSED
Start: 2024-09-21

## (undated) RX ORDER — FENTANYL CITRATE 50 UG/ML
INJECTION, SOLUTION INTRAMUSCULAR; INTRAVENOUS
Status: DISPENSED
Start: 2019-03-21

## (undated) RX ORDER — HEPARIN SODIUM,PORCINE 10 UNIT/ML
VIAL (ML) INTRAVENOUS
Status: DISPENSED
Start: 2019-05-08

## (undated) RX ORDER — BUPIVACAINE HYDROCHLORIDE 5 MG/ML
INJECTION, SOLUTION EPIDURAL; INTRACAUDAL
Status: DISPENSED
Start: 2019-02-21

## (undated) RX ORDER — POTASSIUM CHLORIDE 7.45 MG/ML
INJECTION INTRAVENOUS
Status: DISPENSED
Start: 2022-02-23

## (undated) RX ORDER — SIMETHICONE 40MG/0.6ML
SUSPENSION, DROPS(FINAL DOSAGE FORM)(ML) ORAL
Status: DISPENSED
Start: 2025-09-02

## (undated) RX ORDER — HEPARIN SODIUM,PORCINE 10 UNIT/ML
VIAL (ML) INTRAVENOUS
Status: DISPENSED
Start: 2018-11-19

## (undated) RX ORDER — NITROGLYCERIN 5 MG/ML
VIAL (ML) INTRAVENOUS
Status: DISPENSED
Start: 2023-02-23

## (undated) RX ORDER — ASPIRIN 325 MG
TABLET ORAL
Status: DISPENSED
Start: 2020-02-12

## (undated) RX ORDER — LIDOCAINE HYDROCHLORIDE 10 MG/ML
INJECTION, SOLUTION EPIDURAL; INFILTRATION; INTRACAUDAL; PERINEURAL
Status: DISPENSED
Start: 2019-02-21

## (undated) RX ORDER — VERAPAMIL HYDROCHLORIDE 2.5 MG/ML
INJECTION, SOLUTION INTRAVENOUS
Status: DISPENSED
Start: 2020-02-12

## (undated) RX ORDER — FENTANYL CITRATE 50 UG/ML
INJECTION, SOLUTION INTRAMUSCULAR; INTRAVENOUS
Status: DISPENSED
Start: 2020-02-20

## (undated) RX ORDER — LIDOCAINE HYDROCHLORIDE 10 MG/ML
INJECTION, SOLUTION EPIDURAL; INFILTRATION; INTRACAUDAL; PERINEURAL
Status: DISPENSED
Start: 2019-06-04

## (undated) RX ORDER — LIDOCAINE 40 MG/G
CREAM TOPICAL
Status: DISPENSED
Start: 2019-05-22

## (undated) RX ORDER — CEFAZOLIN SODIUM 2 G/100ML
INJECTION, SOLUTION INTRAVENOUS
Status: DISPENSED
Start: 2019-03-21

## (undated) RX ORDER — HEPARIN SODIUM 1000 [USP'U]/ML
INJECTION, SOLUTION INTRAVENOUS; SUBCUTANEOUS
Status: DISPENSED
Start: 2019-02-21

## (undated) RX ORDER — NITROGLYCERIN 5 MG/ML
VIAL (ML) INTRAVENOUS
Status: DISPENSED
Start: 2021-03-17

## (undated) RX ORDER — LIDOCAINE 40 MG/G
CREAM TOPICAL
Status: DISPENSED
Start: 2019-04-10

## (undated) RX ORDER — LIDOCAINE HYDROCHLORIDE 20 MG/ML
INJECTION, SOLUTION EPIDURAL; INFILTRATION; INTRACAUDAL; PERINEURAL
Status: DISPENSED
Start: 2019-02-21

## (undated) RX ORDER — REGADENOSON 0.08 MG/ML
INJECTION, SOLUTION INTRAVENOUS
Status: DISPENSED
Start: 2024-04-18

## (undated) RX ORDER — PHENYLEPHRINE HCL IN 0.9% NACL 1 MG/10 ML
SYRINGE (ML) INTRAVENOUS
Status: DISPENSED
Start: 2020-02-12

## (undated) RX ORDER — LIDOCAINE HYDROCHLORIDE 10 MG/ML
INJECTION, SOLUTION INFILTRATION; PERINEURAL
Status: DISPENSED
Start: 2025-08-27

## (undated) RX ORDER — HEPARIN SODIUM 1000 [USP'U]/ML
INJECTION, SOLUTION INTRAVENOUS; SUBCUTANEOUS
Status: DISPENSED
Start: 2019-03-21

## (undated) RX ORDER — LIDOCAINE 40 MG/G
CREAM TOPICAL
Status: DISPENSED
Start: 2019-08-13

## (undated) RX ORDER — LIDOCAINE 40 MG/G
CREAM TOPICAL
Status: DISPENSED
Start: 2019-07-17

## (undated) RX ORDER — ONDANSETRON 2 MG/ML
INJECTION INTRAMUSCULAR; INTRAVENOUS
Status: DISPENSED
Start: 2019-03-21

## (undated) RX ORDER — FENTANYL CITRATE 50 UG/ML
INJECTION, SOLUTION INTRAMUSCULAR; INTRAVENOUS
Status: DISPENSED
Start: 2019-04-30

## (undated) RX ORDER — LIDOCAINE 40 MG/G
CREAM TOPICAL
Status: DISPENSED
Start: 2018-11-15

## (undated) RX ORDER — LIDOCAINE HYDROCHLORIDE 10 MG/ML
INJECTION, SOLUTION EPIDURAL; INFILTRATION; INTRACAUDAL; PERINEURAL
Status: DISPENSED
Start: 2019-10-15

## (undated) RX ORDER — HEPARIN SODIUM 1000 [USP'U]/ML
INJECTION, SOLUTION INTRAVENOUS; SUBCUTANEOUS
Status: DISPENSED
Start: 2020-02-12

## (undated) RX ORDER — AMINOPHYLLINE 25 MG/ML
INJECTION, SOLUTION INTRAVENOUS
Status: DISPENSED
Start: 2025-02-19

## (undated) RX ORDER — SODIUM CHLORIDE 9 MG/ML
INJECTION, SOLUTION INTRAVENOUS
Status: DISPENSED
Start: 2019-04-24

## (undated) RX ORDER — PROTAMINE SULFATE 10 MG/ML
INJECTION, SOLUTION INTRAVENOUS
Status: DISPENSED
Start: 2019-02-24

## (undated) RX ORDER — FENTANYL CITRATE 50 UG/ML
INJECTION, SOLUTION INTRAMUSCULAR; INTRAVENOUS
Status: DISPENSED
Start: 2019-02-19

## (undated) RX ORDER — PENTAMIDINE ISETHIONATE 300 MG/300MG
INHALANT RESPIRATORY (INHALATION)
Status: DISPENSED
Start: 2019-06-18

## (undated) RX ORDER — SODIUM CHLORIDE 9 MG/ML
INJECTION, SOLUTION INTRAVENOUS
Status: DISPENSED
Start: 2022-02-23

## (undated) RX ORDER — LIDOCAINE 40 MG/G
CREAM TOPICAL
Status: DISPENSED
Start: 2022-05-10

## (undated) RX ORDER — FENTANYL CITRATE 50 UG/ML
INJECTION, SOLUTION INTRAMUSCULAR; INTRAVENOUS
Status: DISPENSED
Start: 2020-02-12

## (undated) RX ORDER — HEPARIN SODIUM 1000 [USP'U]/ML
INJECTION, SOLUTION INTRAVENOUS; SUBCUTANEOUS
Status: DISPENSED
Start: 2023-02-23

## (undated) RX ORDER — IOPAMIDOL 755 MG/ML
INJECTION, SOLUTION INTRAVASCULAR
Status: DISPENSED
Start: 2019-02-19

## (undated) RX ORDER — FENTANYL CITRATE 50 UG/ML
INJECTION, SOLUTION INTRAMUSCULAR; INTRAVENOUS
Status: DISPENSED
Start: 2019-02-20

## (undated) RX ORDER — SODIUM CHLORIDE 9 MG/ML
INJECTION, SOLUTION INTRAVENOUS
Status: DISPENSED
Start: 2020-02-12

## (undated) RX ORDER — REGADENOSON 0.08 MG/ML
INJECTION, SOLUTION INTRAVENOUS
Status: DISPENSED
Start: 2022-02-23

## (undated) RX ORDER — DEXAMETHASONE SODIUM PHOSPHATE 4 MG/ML
INJECTION, SOLUTION INTRA-ARTICULAR; INTRALESIONAL; INTRAMUSCULAR; INTRAVENOUS; SOFT TISSUE
Status: DISPENSED
Start: 2019-02-19

## (undated) RX ORDER — LIDOCAINE 40 MG/G
CREAM TOPICAL
Status: DISPENSED
Start: 2020-05-05

## (undated) RX ORDER — LIDOCAINE HYDROCHLORIDE 10 MG/ML
INJECTION, SOLUTION EPIDURAL; INFILTRATION; INTRACAUDAL; PERINEURAL
Status: DISPENSED
Start: 2018-11-16

## (undated) RX ORDER — LIDOCAINE HYDROCHLORIDE 20 MG/ML
SOLUTION OROPHARYNGEAL
Status: DISPENSED
Start: 2020-02-20

## (undated) RX ORDER — CEFAZOLIN SODIUM 1 G/3ML
INJECTION, POWDER, FOR SOLUTION INTRAMUSCULAR; INTRAVENOUS
Status: DISPENSED
Start: 2019-03-21

## (undated) RX ORDER — FENTANYL CITRATE 50 UG/ML
INJECTION, SOLUTION INTRAMUSCULAR; INTRAVENOUS
Status: DISPENSED
Start: 2019-02-18

## (undated) RX ORDER — LIDOCAINE 40 MG/G
CREAM TOPICAL
Status: DISPENSED
Start: 2018-10-22

## (undated) RX ORDER — ALBUTEROL SULFATE 0.83 MG/ML
SOLUTION RESPIRATORY (INHALATION)
Status: DISPENSED
Start: 2019-07-24

## (undated) RX ORDER — HEPARIN SODIUM 1000 [USP'U]/ML
INJECTION, SOLUTION INTRAVENOUS; SUBCUTANEOUS
Status: DISPENSED
Start: 2019-02-18